# Patient Record
Sex: MALE | Race: ASIAN | NOT HISPANIC OR LATINO
[De-identification: names, ages, dates, MRNs, and addresses within clinical notes are randomized per-mention and may not be internally consistent; named-entity substitution may affect disease eponyms.]

---

## 2021-08-17 ENCOUNTER — TRANSCRIPTION ENCOUNTER (OUTPATIENT)
Age: 61
End: 2021-08-17

## 2022-11-24 ENCOUNTER — EMERGENCY (EMERGENCY)
Facility: HOSPITAL | Age: 62
LOS: 1 days | Discharge: ANOTHER HOSPITAL | End: 2022-11-24
Attending: STUDENT IN AN ORGANIZED HEALTH CARE EDUCATION/TRAINING PROGRAM
Payer: MEDICAID

## 2022-11-24 ENCOUNTER — INPATIENT (INPATIENT)
Facility: HOSPITAL | Age: 62
LOS: 61 days | Discharge: HOME | DRG: 3 | End: 2023-01-25
Attending: THORACIC SURGERY (CARDIOTHORACIC VASCULAR SURGERY) | Admitting: SURGERY
Payer: MEDICAID

## 2022-11-24 ENCOUNTER — TRANSCRIPTION ENCOUNTER (OUTPATIENT)
Age: 62
End: 2022-11-24

## 2022-11-24 VITALS
TEMPERATURE: 98 F | HEART RATE: 100 BPM | SYSTOLIC BLOOD PRESSURE: 114 MMHG | HEIGHT: 68 IN | DIASTOLIC BLOOD PRESSURE: 61 MMHG | WEIGHT: 187.39 LBS | RESPIRATION RATE: 18 BRPM | OXYGEN SATURATION: 95 %

## 2022-11-24 VITALS
RESPIRATION RATE: 20 BRPM | DIASTOLIC BLOOD PRESSURE: 63 MMHG | OXYGEN SATURATION: 99 % | TEMPERATURE: 98 F | SYSTOLIC BLOOD PRESSURE: 123 MMHG | HEART RATE: 115 BPM

## 2022-11-24 DIAGNOSIS — K85.90 ACUTE PANCREATITIS WITHOUT NECROSIS OR INFECTION, UNSPECIFIED: ICD-10-CM

## 2022-11-24 LAB
ALBUMIN SERPL ELPH-MCNC: 3.2 G/DL — LOW (ref 3.5–5)
ALBUMIN SERPL ELPH-MCNC: 4 G/DL — SIGNIFICANT CHANGE UP (ref 3.3–5)
ALP SERPL-CCNC: 117 U/L — SIGNIFICANT CHANGE UP (ref 40–120)
ALP SERPL-CCNC: 99 U/L — SIGNIFICANT CHANGE UP (ref 40–120)
ALT FLD-CCNC: 115 U/L — HIGH (ref 10–45)
ALT FLD-CCNC: 119 U/L DA — HIGH (ref 10–60)
ANION GAP SERPL CALC-SCNC: 11 MMOL/L — SIGNIFICANT CHANGE UP (ref 5–17)
ANION GAP SERPL CALC-SCNC: 14 MMOL/L — SIGNIFICANT CHANGE UP (ref 5–17)
ANION GAP SERPL CALC-SCNC: 16 MMOL/L — SIGNIFICANT CHANGE UP (ref 5–17)
APTT BLD: 24.9 SEC — LOW (ref 27.5–35.5)
APTT BLD: 25 SEC — LOW (ref 27.5–35.5)
AST SERPL-CCNC: 166 U/L — HIGH (ref 10–40)
AST SERPL-CCNC: 175 U/L — HIGH (ref 10–40)
BASOPHILS # BLD AUTO: 0.05 K/UL — SIGNIFICANT CHANGE UP (ref 0–0.2)
BASOPHILS # BLD AUTO: 0.06 K/UL — SIGNIFICANT CHANGE UP (ref 0–0.2)
BASOPHILS NFR BLD AUTO: 0.2 % — SIGNIFICANT CHANGE UP (ref 0–2)
BASOPHILS NFR BLD AUTO: 0.3 % — SIGNIFICANT CHANGE UP (ref 0–2)
BILIRUB SERPL-MCNC: 2.8 MG/DL — HIGH (ref 0.2–1.2)
BILIRUB SERPL-MCNC: 3.4 MG/DL — HIGH (ref 0.2–1.2)
BLD GP AB SCN SERPL QL: NEGATIVE — SIGNIFICANT CHANGE UP
BUN SERPL-MCNC: 29 MG/DL — HIGH (ref 7–23)
BUN SERPL-MCNC: 30 MG/DL — HIGH (ref 7–18)
CALCIUM SERPL-MCNC: 8.1 MG/DL — LOW (ref 8.4–10.5)
CALCIUM SERPL-MCNC: 9.1 MG/DL — SIGNIFICANT CHANGE UP (ref 8.4–10.5)
CHLORIDE SERPL-SCNC: 103 MMOL/L — SIGNIFICANT CHANGE UP (ref 96–108)
CHLORIDE SERPL-SCNC: 98 MMOL/L — SIGNIFICANT CHANGE UP (ref 96–108)
CHOLEST SERPL-MCNC: 98 MG/DL — SIGNIFICANT CHANGE UP
CK MB BLD-MCNC: 4.2 % — HIGH (ref 0–3.5)
CK MB CFR SERPL CALC: 9.5 NG/ML — HIGH (ref 0–6.7)
CK SERPL-CCNC: 224 U/L — HIGH (ref 30–200)
CO2 SERPL-SCNC: 19 MMOL/L — LOW (ref 22–31)
CO2 SERPL-SCNC: 21 MMOL/L — LOW (ref 22–31)
CO2 SERPL-SCNC: 23 MMOL/L — SIGNIFICANT CHANGE UP (ref 22–31)
CREAT SERPL-MCNC: 2.05 MG/DL — HIGH (ref 0.5–1.3)
CREAT SERPL-MCNC: 2.15 MG/DL — HIGH (ref 0.5–1.3)
CREAT SERPL-MCNC: 2.27 MG/DL — HIGH (ref 0.5–1.3)
EGFR: 32 ML/MIN/1.73M2 — LOW
EGFR: 34 ML/MIN/1.73M2 — LOW
EGFR: 36 ML/MIN/1.73M2 — LOW
EOSINOPHIL # BLD AUTO: 0 K/UL — SIGNIFICANT CHANGE UP (ref 0–0.5)
EOSINOPHIL # BLD AUTO: 0.01 K/UL — SIGNIFICANT CHANGE UP (ref 0–0.5)
EOSINOPHIL NFR BLD AUTO: 0 % — SIGNIFICANT CHANGE UP (ref 0–6)
FLUAV AG NPH QL: SIGNIFICANT CHANGE UP
FLUBV AG NPH QL: SIGNIFICANT CHANGE UP
GLUCOSE BLDC GLUCOMTR-MCNC: 184 MG/DL — HIGH (ref 70–99)
GLUCOSE SERPL-MCNC: 257 MG/DL — HIGH (ref 70–99)
GLUCOSE SERPL-MCNC: 260 MG/DL — HIGH (ref 70–99)
GLUCOSE SERPL-MCNC: 270 MG/DL — HIGH (ref 70–99)
HCT VFR BLD CALC: 45.4 % — SIGNIFICANT CHANGE UP (ref 39–50)
HCT VFR BLD CALC: 46.8 % — SIGNIFICANT CHANGE UP (ref 39–50)
HDLC SERPL-MCNC: 15 MG/DL — LOW
HGB BLD-MCNC: 14.6 G/DL — SIGNIFICANT CHANGE UP (ref 13–17)
HGB BLD-MCNC: 15 G/DL — SIGNIFICANT CHANGE UP (ref 13–17)
IMM GRANULOCYTES NFR BLD AUTO: 0.6 % — SIGNIFICANT CHANGE UP (ref 0–0.9)
IMM GRANULOCYTES NFR BLD AUTO: 0.7 % — SIGNIFICANT CHANGE UP (ref 0–0.9)
INR BLD: 1.04 RATIO — SIGNIFICANT CHANGE UP (ref 0.88–1.16)
INR BLD: 1.07 RATIO — SIGNIFICANT CHANGE UP (ref 0.88–1.16)
LIDOCAIN IGE QN: HIGH U/L (ref 73–393)
LIPID PNL WITH DIRECT LDL SERPL: 63 MG/DL — SIGNIFICANT CHANGE UP
LYMPHOCYTES # BLD AUTO: 0.76 K/UL — LOW (ref 1–3.3)
LYMPHOCYTES # BLD AUTO: 0.78 K/UL — LOW (ref 1–3.3)
LYMPHOCYTES # BLD AUTO: 3.8 % — LOW (ref 13–44)
LYMPHOCYTES # BLD AUTO: 3.9 % — LOW (ref 13–44)
MAGNESIUM SERPL-MCNC: 1.8 MG/DL — SIGNIFICANT CHANGE UP (ref 1.6–2.6)
MCHC RBC-ENTMCNC: 28.7 PG — SIGNIFICANT CHANGE UP (ref 27–34)
MCHC RBC-ENTMCNC: 28.9 PG — SIGNIFICANT CHANGE UP (ref 27–34)
MCHC RBC-ENTMCNC: 32.1 GM/DL — SIGNIFICANT CHANGE UP (ref 32–36)
MCHC RBC-ENTMCNC: 32.2 GM/DL — SIGNIFICANT CHANGE UP (ref 32–36)
MCV RBC AUTO: 89.7 FL — SIGNIFICANT CHANGE UP (ref 80–100)
MCV RBC AUTO: 89.9 FL — SIGNIFICANT CHANGE UP (ref 80–100)
MONOCYTES # BLD AUTO: 1.34 K/UL — HIGH (ref 0–0.9)
MONOCYTES # BLD AUTO: 1.48 K/UL — HIGH (ref 0–0.9)
MONOCYTES NFR BLD AUTO: 6.6 % — SIGNIFICANT CHANGE UP (ref 2–14)
MONOCYTES NFR BLD AUTO: 7.4 % — SIGNIFICANT CHANGE UP (ref 2–14)
NEUTROPHILS # BLD AUTO: 17.56 K/UL — HIGH (ref 1.8–7.4)
NEUTROPHILS # BLD AUTO: 17.89 K/UL — HIGH (ref 1.8–7.4)
NEUTROPHILS NFR BLD AUTO: 87.8 % — HIGH (ref 43–77)
NEUTROPHILS NFR BLD AUTO: 88.7 % — HIGH (ref 43–77)
NON HDL CHOLESTEROL: 83 MG/DL — SIGNIFICANT CHANGE UP
NRBC # BLD: 0 /100 WBCS — SIGNIFICANT CHANGE UP (ref 0–0)
NT-PROBNP SERPL-SCNC: 330 PG/ML — HIGH (ref 0–300)
PLATELET # BLD AUTO: 204 K/UL — SIGNIFICANT CHANGE UP (ref 150–400)
PLATELET # BLD AUTO: 218 K/UL — SIGNIFICANT CHANGE UP (ref 150–400)
POTASSIUM SERPL-MCNC: 4.3 MMOL/L — SIGNIFICANT CHANGE UP (ref 3.5–5.3)
POTASSIUM SERPL-MCNC: 4.4 MMOL/L — SIGNIFICANT CHANGE UP (ref 3.5–5.3)
POTASSIUM SERPL-MCNC: 5.9 MMOL/L — HIGH (ref 3.5–5.3)
POTASSIUM SERPL-SCNC: 4.3 MMOL/L — SIGNIFICANT CHANGE UP (ref 3.5–5.3)
POTASSIUM SERPL-SCNC: 4.4 MMOL/L — SIGNIFICANT CHANGE UP (ref 3.5–5.3)
POTASSIUM SERPL-SCNC: 5.9 MMOL/L — HIGH (ref 3.5–5.3)
PROT SERPL-MCNC: 7.5 G/DL — SIGNIFICANT CHANGE UP (ref 6–8.3)
PROT SERPL-MCNC: 8 G/DL — SIGNIFICANT CHANGE UP (ref 6–8.3)
PROTHROM AB SERPL-ACNC: 12 SEC — SIGNIFICANT CHANGE UP (ref 10.5–13.4)
PROTHROM AB SERPL-ACNC: 12.8 SEC — SIGNIFICANT CHANGE UP (ref 10.5–13.4)
RBC # BLD: 5.05 M/UL — SIGNIFICANT CHANGE UP (ref 4.2–5.8)
RBC # BLD: 5.22 M/UL — SIGNIFICANT CHANGE UP (ref 4.2–5.8)
RBC # FLD: 14.7 % — HIGH (ref 10.3–14.5)
RH IG SCN BLD-IMP: POSITIVE — SIGNIFICANT CHANGE UP
SARS-COV-2 RNA SPEC QL NAA+PROBE: SIGNIFICANT CHANGE UP
SODIUM SERPL-SCNC: 133 MMOL/L — LOW (ref 135–145)
SODIUM SERPL-SCNC: 137 MMOL/L — SIGNIFICANT CHANGE UP (ref 135–145)
TRIGL SERPL-MCNC: 103 MG/DL — SIGNIFICANT CHANGE UP
TROPONIN I, HIGH SENSITIVITY RESULT: 234.6 NG/L — HIGH
TROPONIN T, HIGH SENSITIVITY RESULT: 1403 NG/L — HIGH (ref 0–51)
TROPONIN T, HIGH SENSITIVITY RESULT: 455 NG/L — HIGH (ref 0–51)
TROPONIN T, HIGH SENSITIVITY RESULT: 87 NG/L — HIGH (ref 0–51)
WBC # BLD: 20.02 K/UL — HIGH (ref 3.8–10.5)
WBC # BLD: 20.18 K/UL — HIGH (ref 3.8–10.5)
WBC # FLD AUTO: 20.02 K/UL — HIGH (ref 3.8–10.5)
WBC # FLD AUTO: 20.18 K/UL — HIGH (ref 3.8–10.5)

## 2022-11-24 PROCEDURE — 76705 ECHO EXAM OF ABDOMEN: CPT | Mod: 26

## 2022-11-24 PROCEDURE — 99291 CRITICAL CARE FIRST HOUR: CPT

## 2022-11-24 PROCEDURE — 87637 SARSCOV2&INF A&B&RSV AMP PRB: CPT

## 2022-11-24 PROCEDURE — 99292 CRITICAL CARE ADDL 30 MIN: CPT

## 2022-11-24 PROCEDURE — 99253 IP/OBS CNSLTJ NEW/EST LOW 45: CPT | Mod: GC

## 2022-11-24 PROCEDURE — 85025 COMPLETE CBC W/AUTO DIFF WBC: CPT

## 2022-11-24 PROCEDURE — 74177 CT ABD & PELVIS W/CONTRAST: CPT | Mod: 26,MB

## 2022-11-24 PROCEDURE — 80053 COMPREHEN METABOLIC PANEL: CPT

## 2022-11-24 PROCEDURE — 99285 EMERGENCY DEPT VISIT HI MDM: CPT

## 2022-11-24 PROCEDURE — 85730 THROMBOPLASTIN TIME PARTIAL: CPT

## 2022-11-24 PROCEDURE — 99285 EMERGENCY DEPT VISIT HI MDM: CPT | Mod: 25

## 2022-11-24 PROCEDURE — 99284 EMERGENCY DEPT VISIT MOD MDM: CPT

## 2022-11-24 PROCEDURE — 83690 ASSAY OF LIPASE: CPT

## 2022-11-24 PROCEDURE — 96374 THER/PROPH/DIAG INJ IV PUSH: CPT

## 2022-11-24 PROCEDURE — 93010 ELECTROCARDIOGRAM REPORT: CPT

## 2022-11-24 PROCEDURE — 71045 X-RAY EXAM CHEST 1 VIEW: CPT | Mod: 26

## 2022-11-24 PROCEDURE — 85610 PROTHROMBIN TIME: CPT

## 2022-11-24 PROCEDURE — 71045 X-RAY EXAM CHEST 1 VIEW: CPT

## 2022-11-24 PROCEDURE — 36415 COLL VENOUS BLD VENIPUNCTURE: CPT

## 2022-11-24 PROCEDURE — 84484 ASSAY OF TROPONIN QUANT: CPT

## 2022-11-24 PROCEDURE — 93005 ELECTROCARDIOGRAM TRACING: CPT

## 2022-11-24 RX ORDER — HEPARIN SODIUM 5000 [USP'U]/ML
5000 INJECTION INTRAVENOUS; SUBCUTANEOUS ONCE
Refills: 0 | Status: COMPLETED | OUTPATIENT
Start: 2022-11-24 | End: 2022-11-24

## 2022-11-24 RX ORDER — ONDANSETRON 8 MG/1
4 TABLET, FILM COATED ORAL ONCE
Refills: 0 | Status: COMPLETED | OUTPATIENT
Start: 2022-11-24 | End: 2022-11-24

## 2022-11-24 RX ORDER — SODIUM CHLORIDE 9 MG/ML
1000 INJECTION, SOLUTION INTRAVENOUS
Refills: 0 | Status: DISCONTINUED | OUTPATIENT
Start: 2022-11-24 | End: 2022-11-24

## 2022-11-24 RX ORDER — CLOPIDOGREL BISULFATE 75 MG/1
75 TABLET, FILM COATED ORAL DAILY
Refills: 0 | Status: DISCONTINUED | OUTPATIENT
Start: 2022-11-25 | End: 2022-11-25

## 2022-11-24 RX ORDER — HEPARIN SODIUM 5000 [USP'U]/ML
4000 INJECTION INTRAVENOUS; SUBCUTANEOUS ONCE
Refills: 0 | Status: DISCONTINUED | OUTPATIENT
Start: 2022-11-24 | End: 2022-11-24

## 2022-11-24 RX ORDER — DEXTROSE 50 % IN WATER 50 %
15 SYRINGE (ML) INTRAVENOUS ONCE
Refills: 0 | Status: DISCONTINUED | OUTPATIENT
Start: 2022-11-24 | End: 2022-11-25

## 2022-11-24 RX ORDER — INSULIN HUMAN 100 [IU]/ML
5 INJECTION, SOLUTION SUBCUTANEOUS ONCE
Refills: 0 | Status: COMPLETED | OUTPATIENT
Start: 2022-11-24 | End: 2022-11-24

## 2022-11-24 RX ORDER — SODIUM CHLORIDE 9 MG/ML
1000 INJECTION, SOLUTION INTRAVENOUS ONCE
Refills: 0 | Status: COMPLETED | OUTPATIENT
Start: 2022-11-24 | End: 2022-11-24

## 2022-11-24 RX ORDER — SODIUM CHLORIDE 9 MG/ML
1000 INJECTION, SOLUTION INTRAVENOUS
Refills: 0 | Status: DISCONTINUED | OUTPATIENT
Start: 2022-11-24 | End: 2022-11-25

## 2022-11-24 RX ORDER — ENOXAPARIN SODIUM 100 MG/ML
40 INJECTION SUBCUTANEOUS EVERY 24 HOURS
Refills: 0 | Status: DISCONTINUED | OUTPATIENT
Start: 2022-11-24 | End: 2022-11-24

## 2022-11-24 RX ORDER — CLOPIDOGREL BISULFATE 75 MG/1
300 TABLET, FILM COATED ORAL ONCE
Refills: 0 | Status: COMPLETED | OUTPATIENT
Start: 2022-11-24 | End: 2022-11-24

## 2022-11-24 RX ORDER — DEXTROSE 50 % IN WATER 50 %
12.5 SYRINGE (ML) INTRAVENOUS ONCE
Refills: 0 | Status: DISCONTINUED | OUTPATIENT
Start: 2022-11-24 | End: 2022-11-25

## 2022-11-24 RX ORDER — CARVEDILOL PHOSPHATE 80 MG/1
6.25 CAPSULE, EXTENDED RELEASE ORAL EVERY 12 HOURS
Refills: 0 | Status: DISCONTINUED | OUTPATIENT
Start: 2022-11-24 | End: 2022-11-25

## 2022-11-24 RX ORDER — INSULIN LISPRO 100/ML
VIAL (ML) SUBCUTANEOUS
Refills: 0 | Status: DISCONTINUED | OUTPATIENT
Start: 2022-11-24 | End: 2022-11-25

## 2022-11-24 RX ORDER — PIPERACILLIN AND TAZOBACTAM 4; .5 G/20ML; G/20ML
3.38 INJECTION, POWDER, LYOPHILIZED, FOR SOLUTION INTRAVENOUS ONCE
Refills: 0 | Status: COMPLETED | OUTPATIENT
Start: 2022-11-24 | End: 2022-11-24

## 2022-11-24 RX ORDER — ACETAMINOPHEN 500 MG
1000 TABLET ORAL ONCE
Refills: 0 | Status: COMPLETED | OUTPATIENT
Start: 2022-11-24 | End: 2022-11-24

## 2022-11-24 RX ORDER — ASPIRIN/CALCIUM CARB/MAGNESIUM 324 MG
324 TABLET ORAL ONCE
Refills: 0 | Status: COMPLETED | OUTPATIENT
Start: 2022-11-24 | End: 2022-11-24

## 2022-11-24 RX ORDER — SODIUM ZIRCONIUM CYCLOSILICATE 10 G/10G
10 POWDER, FOR SUSPENSION ORAL ONCE
Refills: 0 | Status: COMPLETED | OUTPATIENT
Start: 2022-11-24 | End: 2022-11-24

## 2022-11-24 RX ORDER — HEPARIN SODIUM 5000 [USP'U]/ML
INJECTION INTRAVENOUS; SUBCUTANEOUS
Qty: 25000 | Refills: 0 | Status: DISCONTINUED | OUTPATIENT
Start: 2022-11-24 | End: 2022-11-24

## 2022-11-24 RX ORDER — HEPARIN SODIUM 5000 [USP'U]/ML
5600 INJECTION INTRAVENOUS; SUBCUTANEOUS EVERY 6 HOURS
Refills: 0 | Status: DISCONTINUED | OUTPATIENT
Start: 2022-11-24 | End: 2022-11-24

## 2022-11-24 RX ORDER — INSULIN LISPRO 100/ML
VIAL (ML) SUBCUTANEOUS AT BEDTIME
Refills: 0 | Status: DISCONTINUED | OUTPATIENT
Start: 2022-11-24 | End: 2022-11-25

## 2022-11-24 RX ORDER — HEPARIN SODIUM 5000 [USP'U]/ML
4000 INJECTION INTRAVENOUS; SUBCUTANEOUS EVERY 6 HOURS
Refills: 0 | Status: DISCONTINUED | OUTPATIENT
Start: 2022-11-24 | End: 2022-11-24

## 2022-11-24 RX ORDER — PANTOPRAZOLE SODIUM 20 MG/1
40 TABLET, DELAYED RELEASE ORAL
Refills: 0 | Status: DISCONTINUED | OUTPATIENT
Start: 2022-11-24 | End: 2022-12-08

## 2022-11-24 RX ORDER — HYDROMORPHONE HYDROCHLORIDE 2 MG/ML
0.5 INJECTION INTRAMUSCULAR; INTRAVENOUS; SUBCUTANEOUS EVERY 4 HOURS
Refills: 0 | Status: DISCONTINUED | OUTPATIENT
Start: 2022-11-24 | End: 2022-11-25

## 2022-11-24 RX ORDER — DEXTROSE 50 % IN WATER 50 %
25 SYRINGE (ML) INTRAVENOUS ONCE
Refills: 0 | Status: DISCONTINUED | OUTPATIENT
Start: 2022-11-24 | End: 2022-11-25

## 2022-11-24 RX ORDER — INFLUENZA VIRUS VACCINE 15; 15; 15; 15 UG/.5ML; UG/.5ML; UG/.5ML; UG/.5ML
0.5 SUSPENSION INTRAMUSCULAR ONCE
Refills: 0 | Status: DISCONTINUED | OUTPATIENT
Start: 2022-11-24 | End: 2022-11-25

## 2022-11-24 RX ORDER — DEXTROSE 50 % IN WATER 50 %
25 SYRINGE (ML) INTRAVENOUS ONCE
Refills: 0 | Status: COMPLETED | OUTPATIENT
Start: 2022-11-24 | End: 2022-11-24

## 2022-11-24 RX ORDER — ACETAMINOPHEN 500 MG
1000 TABLET ORAL EVERY 6 HOURS
Refills: 0 | Status: DISCONTINUED | OUTPATIENT
Start: 2022-11-24 | End: 2022-11-25

## 2022-11-24 RX ORDER — SODIUM CHLORIDE 9 MG/ML
3 INJECTION INTRAMUSCULAR; INTRAVENOUS; SUBCUTANEOUS EVERY 8 HOURS
Refills: 0 | Status: DISCONTINUED | OUTPATIENT
Start: 2022-11-24 | End: 2022-11-27

## 2022-11-24 RX ORDER — GLUCAGON INJECTION, SOLUTION 0.5 MG/.1ML
1 INJECTION, SOLUTION SUBCUTANEOUS ONCE
Refills: 0 | Status: DISCONTINUED | OUTPATIENT
Start: 2022-11-24 | End: 2022-11-25

## 2022-11-24 RX ORDER — ASPIRIN/CALCIUM CARB/MAGNESIUM 324 MG
81 TABLET ORAL DAILY
Refills: 0 | Status: DISCONTINUED | OUTPATIENT
Start: 2022-11-24 | End: 2022-11-25

## 2022-11-24 RX ADMIN — SODIUM CHLORIDE 2000 MILLILITER(S): 9 INJECTION, SOLUTION INTRAVENOUS at 10:00

## 2022-11-24 RX ADMIN — HEPARIN SODIUM 1000 UNIT(S)/HR: 5000 INJECTION INTRAVENOUS; SUBCUTANEOUS at 18:38

## 2022-11-24 RX ADMIN — HEPARIN SODIUM 5000 UNIT(S): 5000 INJECTION INTRAVENOUS; SUBCUTANEOUS at 18:40

## 2022-11-24 RX ADMIN — INSULIN HUMAN 5 UNIT(S): 100 INJECTION, SOLUTION SUBCUTANEOUS at 10:00

## 2022-11-24 RX ADMIN — Medication 81 MILLIGRAM(S): at 22:35

## 2022-11-24 RX ADMIN — CLOPIDOGREL BISULFATE 300 MILLIGRAM(S): 75 TABLET, FILM COATED ORAL at 06:40

## 2022-11-24 RX ADMIN — PIPERACILLIN AND TAZOBACTAM 200 GRAM(S): 4; .5 INJECTION, POWDER, LYOPHILIZED, FOR SOLUTION INTRAVENOUS at 14:35

## 2022-11-24 RX ADMIN — ONDANSETRON 4 MILLIGRAM(S): 8 TABLET, FILM COATED ORAL at 06:19

## 2022-11-24 RX ADMIN — ENOXAPARIN SODIUM 40 MILLIGRAM(S): 100 INJECTION SUBCUTANEOUS at 17:19

## 2022-11-24 RX ADMIN — CLOPIDOGREL BISULFATE 300 MILLIGRAM(S): 75 TABLET, FILM COATED ORAL at 07:09

## 2022-11-24 RX ADMIN — ONDANSETRON 4 MILLIGRAM(S): 8 TABLET, FILM COATED ORAL at 08:24

## 2022-11-24 RX ADMIN — Medication 400 MILLIGRAM(S): at 19:45

## 2022-11-24 RX ADMIN — Medication 25 MILLILITER(S): at 10:01

## 2022-11-24 RX ADMIN — Medication 1000 MILLIGRAM(S): at 11:05

## 2022-11-24 RX ADMIN — SODIUM CHLORIDE 150 MILLILITER(S): 9 INJECTION, SOLUTION INTRAVENOUS at 08:23

## 2022-11-24 RX ADMIN — SODIUM ZIRCONIUM CYCLOSILICATE 10 GRAM(S): 10 POWDER, FOR SUSPENSION ORAL at 10:01

## 2022-11-24 RX ADMIN — Medication 324 MILLIGRAM(S): at 06:39

## 2022-11-24 RX ADMIN — Medication 400 MILLIGRAM(S): at 08:23

## 2022-11-24 RX ADMIN — SODIUM CHLORIDE 100 MILLILITER(S): 9 INJECTION, SOLUTION INTRAVENOUS at 16:36

## 2022-11-24 NOTE — ED PROVIDER NOTE - OBJECTIVE STATEMENT
63 yo M h/o CAD s/p CABG (3 years ago in [facility]), DM, HTN p/w N/V, dizziness, chest and ABD pain with SOB. Pt woke up with these symptoms approx 1 hour ago. Pt denies recent fever, dysuria or frequency/hesitancy, focal numbness/weakness, syncope, other recent illness or hospitalizations.

## 2022-11-24 NOTE — ED PROVIDER NOTE - CLINICAL SUMMARY MEDICAL DECISION MAKING FREE TEXT BOX
Pt p/w symptoms concerning for ACS and likely STEMI on EKG. EKG faxed and Cards c/s STAT. On hold now. Labs pending. Pt on monitor.

## 2022-11-24 NOTE — CHART NOTE - NSCHARTNOTEFT_GEN_A_CORE
Vital Signs Last 24 Hrs  T(C): 36.9 (24 Nov 2022 16:00), Max: 36.9 (24 Nov 2022 16:00)  T(F): 98.4 (24 Nov 2022 16:00), Max: 98.4 (24 Nov 2022 16:00)  HR: 109 (24 Nov 2022 16:00) (108 - 115)  BP: 106/71 (24 Nov 2022 16:00) (106/61 - 123/63)  BP(mean): --  RR: 20 (24 Nov 2022 16:00) (20 - 20)  SpO2: 93% (24 Nov 2022 16:00) (93% - 100%)    Parameters below as of 24 Nov 2022 16:00  Patient On (Oxygen Delivery Method): nasal cannula  O2 Flow (L/min): 2    Troponin T, High Sensitivity Result: 455: Specimen not hemolyzed     Repeat Troponin resulted for this patient and it was rising. 455 from 205. Patient without chest pain/SOB/palpitations, vitals stable. Repeat EKG ordered. ECHO was ordered but not completed at this time. Urgent bedside ECHO not needed as per conversation with in house cardiology fellow. Low likelihood of ACS. Patient already received bolus dose of aspirin and plavix. Will start heparin drip.     Keith Parker, PGY1  Trauma Surg [phone] Vital Signs Last 24 Hrs  T(C): 36.9 (24 Nov 2022 16:00), Max: 36.9 (24 Nov 2022 16:00)  T(F): 98.4 (24 Nov 2022 16:00), Max: 98.4 (24 Nov 2022 16:00)  HR: 109 (24 Nov 2022 16:00) (108 - 115)  BP: 106/71 (24 Nov 2022 16:00) (106/61 - 123/63)  BP(mean): --  RR: 20 (24 Nov 2022 16:00) (20 - 20)  SpO2: 93% (24 Nov 2022 16:00) (93% - 100%)    Parameters below as of 24 Nov 2022 16:00  Patient On (Oxygen Delivery Method): nasal cannula  O2 Flow (L/min): 2    Troponin T, High Sensitivity Result: 455: Specimen not hemolyzed     Repeat Troponin resulted for this patient and it was rising. 455 from 205. Patient without chest pain/SOB/palpitations, vitals stable. Repeat EKG ordered. ECHO was ordered but not completed at this time. Urgent bedside ECHO not needed as per conversation with in house cardiology fellow. Low likelihood of ACS. Patient already received bolus dose of aspirin and plavix. Will start heparin drip.     Keith Parker, PGY1  Trauma Surg [phone] Vital Signs Last 24 Hrs  T(C): 36.9 (24 Nov 2022 16:00), Max: 36.9 (24 Nov 2022 16:00)  T(F): 98.4 (24 Nov 2022 16:00), Max: 98.4 (24 Nov 2022 16:00)  HR: 109 (24 Nov 2022 16:00) (108 - 115)  BP: 106/71 (24 Nov 2022 16:00) (106/61 - 123/63)  BP(mean): --  RR: 20 (24 Nov 2022 16:00) (20 - 20)  SpO2: 93% (24 Nov 2022 16:00) (93% - 100%)    Parameters below as of 24 Nov 2022 16:00  Patient On (Oxygen Delivery Method): nasal cannula  O2 Flow (L/min): 2    Troponin T, High Sensitivity Result: 455: Specimen not hemolyzed     Repeat Troponin resulted for this patient and it was rising. 455 from 205. Patient without chest pain/SOB/palpitations, vitals stable. Repeat EKG ordered. ECHO was ordered but not completed at this time. Urgent bedside ECHO not needed as per conversation with in house cardiology fellow. Low likelihood of ACS. Patient already received bolus dose of aspirin and plavix. Will start heparin drip.     Keith Parker, PGY1  Trauma Surg [phone]

## 2022-11-24 NOTE — ED PROVIDER NOTE - NS ED ROS FT
Patient Reports No  Fever/Chills  Urinary Symptoms  Syncope, Focal Numbness or Weakness  Other Recent Illness or Hospitalizations

## 2022-11-24 NOTE — ED PROVIDER NOTE - PHYSICAL EXAMINATION
Gen: Elevated BMI, uncomfortable appearing, hemodynamically stable   HEENT: No nasal discharge, mucous membranes dry, no oropharyngeal edema/erythema/exudates   CV: RRR, +S1/S2, no M/R/G, equal b/l radial pulses 2+ and equal BP b/l in UE   Resp: CTAB, no W/R/R, SPO2 >95% on RA, no increased WOB   GI: Abdomen soft non-distended, diffuse TTP with no rebound/guarding, no masses/organomegaly   MSK/Skin: No midline spinal TTP with mild b/l upper thoracic paraspinal TTP, no CVA tenderness, no open wounds, no bruising, no LE edema  Neuro: CN2-12 grossly intact, A&Ox4, MS +5/5 in UE and LE BL, gross sensation intact in UE and LE BL, gait smooth and coordinated, negative pronator drift   Psych: appropriate mood

## 2022-11-24 NOTE — ED PROVIDER NOTE - PROGRESS NOTE DETAILS
Lance, PGY-3  Cards consulted for c/f STEMI and will see patient Lance, PGY-3  Cards report no need cath lab at this time Lance, PGY-3  Cards consulted for c/f STEMI at OSH and will see patient Lance, PGY-3  Cholecystitis shown on U/s. Surgery consulted and will see patient. 2nd trop elevated from 87 to 207. Cardiology made aware who report unlikely ACS in setting of pancreatitis and report no heparin at this time. Continue to trend cardiac enzymes. Will send 3hr trop

## 2022-11-24 NOTE — H&P ADULT - NSHPPHYSICALEXAM_GEN_ALL_CORE
PHYSICAL EXAM:  GENERAL: NAD, well-groomed, well-developed  HEAD:  Atraumatic, Normocephalic  RESPIRATORY: breathing comfortably on RA  ABDOMEN: Soft, Nontender, Nondistended; Wound VAC on and functional  NEUROLOGY: A&Ox3, nonfocal, moving all extremities  EXTREMITIES:  2+ Peripheral Pulses, No clubbing, cyanosis, or edema  SKIN: warm, dry, normal color, no rash or abnormal lesions

## 2022-11-24 NOTE — ED PROVIDER NOTE - OBJECTIVE STATEMENT
61 y/o male with pmhx of quadruple bypass, DM, HTN, HLD presenting as transfer from [place] for c/f STEMI. PTA arrival received 325 aspirin, 600 plavix, and no heparin drip started. Around 1:30 am patient had multiple episodes of non-bloody diarrhea and emesis with associated abdominal pain and chest pain, unable to localize. No recent fevers/chills, cough, rashes, or changes in urination. Does report mild diffuse back pain; started 5 days ago in setting on injury while walking and lifting objects. 63 y/o male with pmhx of quadruple bypass, DM, HTN, HLD presenting as transfer from [place] for c/f STEMI. PTA arrival received 325 aspirin, 600 plavix, and no heparin drip started. Around 1:30 am patient had multiple episodes of non-bloody diarrhea and emesis with associated abdominal pain and chest pain, unable to localize. No recent fevers/chills, cough, rashes, or changes in urination. Does report mild diffuse back pain; started 5 days ago in setting on injury while walking and lifting objects. 63 y/o male with pmhx of quadruple bypass, DM, HTN, HLD presenting as transfer from [place] for c/f STEMI. PTA arrival received 325 aspirin, 600 plavix, and no heparin drip started. Around 1:30 am patient had multiple episodes of non-bloody diarrhea and emesis with associated abdominal pain and chest pain, unable to localize. No recent fevers/chills, cough, rashes, or changes in urination. Does report mild diffuse back pain; started 5 days ago in setting on injury while walking and lifting objects. 61 y/o male with pmhx of CABG, DM, HTN, HLD presenting as transfer from [place] for c/f STEMI. PTA arrival received 325 aspirin, 600 plavix, and no heparin drip started. Around 1:30 am patient had multiple episodes of non-bloody diarrhea and emesis with associated abdominal pain and chest pain, unable to localize, non-radiating, with associated SOB and no associated palpitations or diaphoresis. No recent fevers/chills, cough, rashes, or changes in urination. Does report mild diffuse back pain; started 5 days ago in setting on injury while walking and lifting objects. Denies focal weakness, numbness/tingling, or LOC due does report mild dizziness. 61 y/o male with pmhx of CABG, DM, HTN, HLD presenting as transfer from [place] for c/f STEMI. PTA arrival received 325 aspirin, 600 plavix, and no heparin drip started. Around 1:30 am patient had multiple episodes of non-bloody diarrhea and emesis with associated abdominal pain and chest pain, unable to localize, non-radiating, with associated SOB and no associated palpitations or diaphoresis. No recent fevers/chills, cough, rashes, or changes in urination. Does report mild diffuse back pain; started 5 days ago in setting on injury while walking and lifting objects. Denies focal weakness, numbness/tingling, or LOC due does report mild dizziness. 61 y/o male with pmhx of CABG, DM, HTN, HLD presenting as transfer from [place] for c/f STEMI. PTA arrival received 325 aspirin, 600 plavix, and no heparin drip started. Around 1:30 am patient had multiple episodes of non-bloody diarrhea and emesis with associated abdominal pain and chest pain, unable to localize, non-radiating, with associated SOB and no associated palpitations or diaphoresis. No recent fevers/chills, cough, rashes, or changes in urination. Does report mild diffuse back pain; started 5 days ago in setting on injury while walking and lifting objects. Denies focal weakness, numbness/tingling, or LOC due does report mild dizziness.

## 2022-11-24 NOTE — H&P ADULT - NSHPLABSRESULTS_GEN_ALL_CORE
WBC Count: 20.02 K/uL (11-24-22 @ 07:57)   Hematocrit: 46.8 % (11-24-22 @ 07:57)   Creatinine, Serum: 2.05 mg/dL (11-24-22 @ 11:31)  Creatinine, Serum: 2.15 mg/dL (11-24-22 @ 07:57)      US ABDOMEN RT UPR QUADRANT    PROCEDURE DATE: 11/24/2022        INTERPRETATION: CLINICAL INFORMATION: Abdominal pain with elevated bilirubin and acute interstitial pancreatitis.    COMPARISON: Same-day CT abdomen pelvis    TECHNIQUE: Sonography of the right upper quadrant.    FINDINGS:    Liver: Measures 16.2 cm with slightly heterogeneous parenchyma. Liver parenchyma is limited in evaluation due to shadowing artifact.  Bile ducts: Normal caliber. Common bile duct measures 4 mm.  Gallbladder: Gallbladder wall edema, thickened to 8 mm, with numerous nonmobile tiny stones versus tumefactive sludge. Trace pericholecystic fluid. Dave's sign is negative, however patient received pain medication.  Pancreas: Poorly visualized.  Right kidney: Measures 13.6 cm. No hydronephrosis.  Ascites: None.  Aorta/ IVC: Suboptimally assessed due to artifact.    IMPRESSION:    Gallbladder wall edema, thickened to 8 mm, with numerous nonmobile tiny stones versus tumefactive sludge. Trace pericholecystic fluid. Daev's sign is negative, however patient received pain medication. Findings are concerning for acute cholecystitis. Recommend HIDA scan for confirmation.    --- End of Report ---      CT ABDOMEN AND PELVIS IC    PROCEDURE DATE: 11/24/2022        INTERPRETATION: CLINICAL INFORMATION: Abdominal pain with elevated lipase.    COMPARISON: None.    CONTRAST/COMPLICATIONS:  IV Contrast: Omnipaque 350 90 cc administered 10 cc discarded  Oral Contrast: NONE  Complications: None reported at time of study completion    PROCEDURE:  CT of the Abdomen and Pelvis was performed.  Sagittal and coronal reformats were performed.    FINDINGS:  LOWER CHEST: Bibasilar atelectasis. Coronary artery calcifications.    LIVER: Within normal limits.  BILE DUCTS: Normal caliber.  GALLBLADDER: Mild gallbladder wall edema, likely secondary to acute pancreatic process.  SPLEEN: Within normal limits.  PANCREAS: Homogeneous parenchymal enhancement of the pancreas with surrounding fat stranding/inflammatory changes. No peripancreatic fluid collection.  ADRENALS: Within normal limits.  KIDNEYS/URETERS: Within normal limits.    BLADDER: Within normal limits.  REPRODUCTIVE ORGANS: Prostate within normal limits.    BOWEL: Submucosal fatty infiltration of the ascending colon, likely sequela of prior colitis. Mild bowel thickening of the duodenum and transverse colon, likely secondary to acute pancreatic process. No bowel obstruction. Appendix is normal.  PERITONEUM: No ascites.  VESSELS: Atherosclerotic changes.  RETROPERITONEUM/LYMPH NODES: No lymphadenopathy.  ABDOMINAL WALL: Within normal limits.  BONES: Degenerative changes.    IMPRESSION:  Acute interstitial edematous pancreatitis. No peripancreatic fluid collection.    --- End of Report --- WBC Count: 20.02 K/uL (11-24-22 @ 07:57)   Hematocrit: 46.8 % (11-24-22 @ 07:57)   Creatinine, Serum: 2.05 mg/dL (11-24-22 @ 11:31)  Creatinine, Serum: 2.15 mg/dL (11-24-22 @ 07:57)      US ABDOMEN RT UPR QUADRANT    PROCEDURE DATE: 11/24/2022        INTERPRETATION: CLINICAL INFORMATION: Abdominal pain with elevated bilirubin and acute interstitial pancreatitis.    COMPARISON: Same-day CT abdomen pelvis    TECHNIQUE: Sonography of the right upper quadrant.    FINDINGS:    Liver: Measures 16.2 cm with slightly heterogeneous parenchyma. Liver parenchyma is limited in evaluation due to shadowing artifact.  Bile ducts: Normal caliber. Common bile duct measures 4 mm.  Gallbladder: Gallbladder wall edema, thickened to 8 mm, with numerous nonmobile tiny stones versus tumefactive sludge. Trace pericholecystic fluid. Dave's sign is negative, however patient received pain medication.  Pancreas: Poorly visualized.  Right kidney: Measures 13.6 cm. No hydronephrosis.  Ascites: None.  Aorta/ IVC: Suboptimally assessed due to artifact.    IMPRESSION:    Gallbladder wall edema, thickened to 8 mm, with numerous nonmobile tiny stones versus tumefactive sludge. Trace pericholecystic fluid. Dave's sign is negative, however patient received pain medication. Findings are concerning for acute cholecystitis. Recommend HIDA scan for confirmation.    --- End of Report ---      CT ABDOMEN AND PELVIS IC    PROCEDURE DATE: 11/24/2022        INTERPRETATION: CLINICAL INFORMATION: Abdominal pain with elevated lipase.    COMPARISON: None.    CONTRAST/COMPLICATIONS:  IV Contrast: Omnipaque 350 90 cc administered 10 cc discarded  Oral Contrast: NONE  Complications: None reported at time of study completion    PROCEDURE:  CT of the Abdomen and Pelvis was performed.  Sagittal and coronal reformats were performed.    FINDINGS:  LOWER CHEST: Bibasilar atelectasis. Coronary artery calcifications.    LIVER: Within normal limits.  BILE DUCTS: Normal caliber.  GALLBLADDER: Mild gallbladder wall edema, likely secondary to acute pancreatic process.  SPLEEN: Within normal limits.  PANCREAS: Homogeneous parenchymal enhancement of the pancreas with surrounding fat stranding/inflammatory changes. No peripancreatic fluid collection.  ADRENALS: Within normal limits.  KIDNEYS/URETERS: Within normal limits.    BLADDER: Within normal limits.  REPRODUCTIVE ORGANS: Prostate within normal limits.    BOWEL: Submucosal fatty infiltration of the ascending colon, likely sequela of prior colitis. Mild bowel thickening of the duodenum and transverse colon, likely secondary to acute pancreatic process. No bowel obstruction. Appendix is normal.  PERITONEUM: No ascites.  VESSELS: Atherosclerotic changes.  RETROPERITONEUM/LYMPH NODES: No lymphadenopathy.  ABDOMINAL WALL: Within normal limits.  BONES: Degenerative changes.    IMPRESSION:  Acute interstitial edematous pancreatitis. No peripancreatic fluid collection.    --- End of Report ---

## 2022-11-24 NOTE — CONSULT NOTE ADULT - SUBJECTIVE AND OBJECTIVE BOX
Patient seen and evaluated at bedside    Chief Complaint: Abdominal pain, nausea, vomiting    HPI:  This is a 63yo Male with PMHx of CABG x4 in [facility] ~3yrs ago, T2DM, HTN, HLD, who presented initially to [place] ED for one day of abdominal pain, nausea, vomiting. Found to have abnormal EKG and was transferred here for further evaluation.    Patient is accompanied by his daughter. Both ate samosas last night and have not been feeling well since then. Patient woke up ~1:30am with severe abdominal pain located in his epigastric region and radiates to his left chest wall and left side of his back. Also with 6 episodes of vomiting since then and loose stools. Daughter also with loose stools and nausea. Patient denies fevers, chills, shortness of breath, orthopnea, PND sxs, lower extremity edema. Patient reports that these sxs are different than what he had prior to needing his CABG.     EKG in ED shows sinus tach with LVH, STD I, II, V4-V6. No prior baseline for comparison.    Labs at OSH notable for Lipase of 30,000.       PMHx:       PSHx:       Allergies:  No Known Allergies      Home Meds:    Current Medications:   acetaminophen   IVPB .. 1000 milliGRAM(s) IV Intermittent Once  lactated ringers. 1000 milliLiter(s) IV Continuous <Continuous>  ondansetron Injectable 4 milliGRAM(s) IV Push once      FAMILY HISTORY:      Social History:  Smoking History:  Alcohol Use:  Drug Use:    REVIEW OF SYSTEMS:  Constitutional:     [x ] negative [ ] fevers [ ] chills [ ] weight loss [ ] weight gain  HEENT:                  [x ] negative [ ] dry eyes [ ] eye irritation [ ] postnasal drip [ ] nasal congestion  CV:                         [ x] negative  [ ] chest pain [ ] orthopnea [ ] palpitations [ ] murmur  Resp:                     [x ] negative [ ] cough [ ] shortness of breath [ ] dyspnea [ ] wheezing [ ] sputum [ ]hemoptysis  GI:                          [ x] negative [ ] nausea [ ] vomiting [ ] diarrhea [ ] constipation [ ] abd pain [ ] dysphagia   :                        [ x] negative [ ] dysuria [ ] nocturia [ ] hematuria [ ] increased urinary frequency  Musculoskeletal: [x ] negative [ ] back pain [ ] myalgias [ ] arthralgias [ ] fracture  Skin:                       [ x] negative [ ] rash [ ] itch  Neurological:        [ x] negative [ ] headache [ ] dizziness [ ] syncope [ ] weakness [ ] numbness  Psychiatric:           [ x] negative [ ] anxiety [ ] depression  Endocrine:            [ x] negative [ ] diabetes [ ] thyroid problem  Heme/Lymph:      [ x] negative [ ] anemia [ ] bleeding problem  Allergic/Immune: [ x] negative [ ] itchy eyes [ ] nasal discharge [ ] hives [ ] angioedema    [ x] All other systems negative  [ ] Unable to assess ROS due to      Physical Exam:  T(F): 98.1 (11-24), Max: 98.1 (11-24)  HR: 108 (11-24) (108 - 115)  BP: 122/64 (11-24) (122/64 - 123/63)  RR: 20 (11-24)  SpO2: 100% (11-24)  GENERAL: No acute distress, well-developed  HEAD:  Atraumatic, Normocephalic  ENT: EOMI, PERRLA, conjunctiva and sclera clear, Neck supple, No JVD, moist mucosa  CHEST/LUNG: Clear to auscultation bilaterally; No wheeze, equal breath sounds bilaterally   BACK: No spinal tenderness  HEART: Regular rate and rhythm; No murmurs, rubs, or gallops  ABDOMEN: Soft, Nontender, Nondistended; Bowel sounds present  EXTREMITIES:  No clubbing, cyanosis, or edema  PSYCH: Nl behavior, nl affect  NEUROLOGY: AAOx3, non-focal, cranial nerves intact  SKIN: Normal color, No rashes or lesions  LINES:    Cardiovascular Diagnostic Testing:    ECG: Personally reviewed:    Echo: Personally reviewed:    Stress Testing:    Cath:    Imaging:    CXR: Personally reviewed    Labs: Personally reviewed                   Patient seen and evaluated at bedside    Chief Complaint: Abdominal pain, nausea, vomiting    HPI:  This is a 61yo Male with PMHx of CABG x4 in [facility] ~3yrs ago, T2DM, HTN, HLD, who presented initially to [place] ED for one day of abdominal pain, nausea, vomiting. Found to have abnormal EKG and was transferred here for further evaluation.    Patient is accompanied by his daughter. Both ate samosas last night and have not been feeling well since then. Patient woke up ~1:30am with severe abdominal pain located in his epigastric region and radiates to his left chest wall and left side of his back. Also with 6 episodes of vomiting since then and loose stools. Daughter also with loose stools and nausea. Patient denies fevers, chills, shortness of breath, orthopnea, PND sxs, lower extremity edema. Patient reports that these sxs are different than what he had prior to needing his CABG.     EKG in ED shows sinus tach with LVH, STD I, II, V4-V6. No prior baseline for comparison.    Labs at OSH notable for Lipase of 30,000.       PMHx:       PSHx:       Allergies:  No Known Allergies      Home Meds:    Current Medications:   acetaminophen   IVPB .. 1000 milliGRAM(s) IV Intermittent Once  lactated ringers. 1000 milliLiter(s) IV Continuous <Continuous>  ondansetron Injectable 4 milliGRAM(s) IV Push once      FAMILY HISTORY:      Social History:  Smoking History:  Alcohol Use:  Drug Use:    REVIEW OF SYSTEMS:  Constitutional:     [x ] negative [ ] fevers [ ] chills [ ] weight loss [ ] weight gain  HEENT:                  [x ] negative [ ] dry eyes [ ] eye irritation [ ] postnasal drip [ ] nasal congestion  CV:                         [ x] negative  [ ] chest pain [ ] orthopnea [ ] palpitations [ ] murmur  Resp:                     [x ] negative [ ] cough [ ] shortness of breath [ ] dyspnea [ ] wheezing [ ] sputum [ ]hemoptysis  GI:                          [ x] negative [ ] nausea [ ] vomiting [ ] diarrhea [ ] constipation [ ] abd pain [ ] dysphagia   :                        [ x] negative [ ] dysuria [ ] nocturia [ ] hematuria [ ] increased urinary frequency  Musculoskeletal: [x ] negative [ ] back pain [ ] myalgias [ ] arthralgias [ ] fracture  Skin:                       [ x] negative [ ] rash [ ] itch  Neurological:        [ x] negative [ ] headache [ ] dizziness [ ] syncope [ ] weakness [ ] numbness  Psychiatric:           [ x] negative [ ] anxiety [ ] depression  Endocrine:            [ x] negative [ ] diabetes [ ] thyroid problem  Heme/Lymph:      [ x] negative [ ] anemia [ ] bleeding problem  Allergic/Immune: [ x] negative [ ] itchy eyes [ ] nasal discharge [ ] hives [ ] angioedema    [ x] All other systems negative  [ ] Unable to assess ROS due to      Physical Exam:  T(F): 98.1 (11-24), Max: 98.1 (11-24)  HR: 108 (11-24) (108 - 115)  BP: 122/64 (11-24) (122/64 - 123/63)  RR: 20 (11-24)  SpO2: 100% (11-24)  GENERAL: No acute distress, well-developed  HEAD:  Atraumatic, Normocephalic  ENT: EOMI, PERRLA, conjunctiva and sclera clear, Neck supple, No JVD, moist mucosa  CHEST/LUNG: Clear to auscultation bilaterally; No wheeze, equal breath sounds bilaterally   BACK: No spinal tenderness  HEART: Regular rate and rhythm; No murmurs, rubs, or gallops  ABDOMEN: Soft, Nontender, Nondistended; Bowel sounds present  EXTREMITIES:  No clubbing, cyanosis, or edema  PSYCH: Nl behavior, nl affect  NEUROLOGY: AAOx3, non-focal, cranial nerves intact  SKIN: Normal color, No rashes or lesions  LINES:    Cardiovascular Diagnostic Testing:    ECG: Personally reviewed:    Echo: Personally reviewed:    Stress Testing:    Cath:    Imaging:    CXR: Personally reviewed    Labs: Personally reviewed                   Patient seen and evaluated at bedside    Chief Complaint: Abdominal pain, nausea, vomiting    HPI:  This is a 61yo Male with PMHx of CABG x4 in [facility] ~3yrs ago, T2DM, HTN, HLD, who presented initially to [facility] ED for one day of abdominal pain, nausea, vomiting. Found to have abnormal EKG and was transferred here for further evaluation.    Patient is accompanied by his daughter. Both ate samosas last night and have not been feeling well since then. Patient woke up ~1:30am with severe abdominal pain located in his epigastric region and radiates to his left chest wall and left side of his back. Also with 6 episodes of vomiting since then and loose stools. Daughter also with loose stools and nausea. Patient denies fevers, chills, shortness of breath, orthopnea, PND sxs, lower extremity edema. Patient reports that these sxs are different than what he had prior to needing his CABG.     EKG in ED shows sinus tach with LVH, STD I, II, V4-V6. No prior baseline for comparison.    Labs at OSH notable for Lipase of 30,000.       PMHx:       PSHx:       Allergies:  No Known Allergies      Home Meds:    Current Medications:   acetaminophen   IVPB .. 1000 milliGRAM(s) IV Intermittent Once  lactated ringers. 1000 milliLiter(s) IV Continuous <Continuous>  ondansetron Injectable 4 milliGRAM(s) IV Push once      FAMILY HISTORY:      Social History:  Smoking History:  Alcohol Use:  Drug Use:    REVIEW OF SYSTEMS:  Constitutional:     [x ] negative [ ] fevers [ ] chills [ ] weight loss [ ] weight gain  HEENT:                  [x ] negative [ ] dry eyes [ ] eye irritation [ ] postnasal drip [ ] nasal congestion  CV:                         [ x] negative  [ ] chest pain [ ] orthopnea [ ] palpitations [ ] murmur  Resp:                     [x ] negative [ ] cough [ ] shortness of breath [ ] dyspnea [ ] wheezing [ ] sputum [ ]hemoptysis  GI:                          [ x] negative [ ] nausea [ ] vomiting [ ] diarrhea [ ] constipation [ ] abd pain [ ] dysphagia   :                        [ x] negative [ ] dysuria [ ] nocturia [ ] hematuria [ ] increased urinary frequency  Musculoskeletal: [x ] negative [ ] back pain [ ] myalgias [ ] arthralgias [ ] fracture  Skin:                       [ x] negative [ ] rash [ ] itch  Neurological:        [ x] negative [ ] headache [ ] dizziness [ ] syncope [ ] weakness [ ] numbness  Psychiatric:           [ x] negative [ ] anxiety [ ] depression  Endocrine:            [ x] negative [ ] diabetes [ ] thyroid problem  Heme/Lymph:      [ x] negative [ ] anemia [ ] bleeding problem  Allergic/Immune: [ x] negative [ ] itchy eyes [ ] nasal discharge [ ] hives [ ] angioedema    [ x] All other systems negative  [ ] Unable to assess ROS due to      Physical Exam:  T(F): 98.1 (11-24), Max: 98.1 (11-24)  HR: 108 (11-24) (108 - 115)  BP: 122/64 (11-24) (122/64 - 123/63)  RR: 20 (11-24)  SpO2: 100% (11-24)  GENERAL: No acute distress, well-developed  HEAD:  Atraumatic, Normocephalic  ENT: EOMI, PERRLA, conjunctiva and sclera clear, Neck supple, No JVD, moist mucosa  CHEST/LUNG: Clear to auscultation bilaterally; No wheeze, equal breath sounds bilaterally   BACK: No spinal tenderness  HEART: Regular rate and rhythm; No murmurs, rubs, or gallops  ABDOMEN: Soft, Nontender, Nondistended; Bowel sounds present  EXTREMITIES:  No clubbing, cyanosis, or edema  PSYCH: Nl behavior, nl affect  NEUROLOGY: AAOx3, non-focal, cranial nerves intact  SKIN: Normal color, No rashes or lesions  LINES:    Cardiovascular Diagnostic Testing:    ECG: Personally reviewed:    Echo: Personally reviewed:    Stress Testing:    Cath:    Imaging:    CXR: Personally reviewed    Labs: Personally reviewed                   Patient seen and evaluated at bedside    Chief Complaint: Abdominal pain, nausea, vomiting    HPI:  This is a 63yo Male with PMHx of CABG x4 in [facility] ~3yrs ago, T2DM, HTN, HLD, who presented initially to [place] ED for one day of abdominal pain, nausea, vomiting. Found to have abnormal EKG and was transferred here for further evaluation.    Patient is accompanied by his daughter. Both ate samosas last night and have not been feeling well since then. Patient woke up ~1:30am with severe abdominal pain located in his epigastric region and radiates to his left chest wall and left side of his back. Also with 6 episodes of vomiting since then and loose stools. Daughter also with loose stools and nausea. Patient denies fevers, chills, shortness of breath, orthopnea, PND sxs, lower extremity edema. Patient reports that these sxs are different than what he had prior to needing his CABG.     EKG in ED shows sinus tach with LVH, STD I, II, V4-V6. Similar to EKG at [place]. No prior baseline for comparison.    Labs at OSH notable for Lipase of >30,000, WBC 20, Cr 2.27, , , Troponin i 234, Covid-19 negative.     Afebrile, HR 110s, BPs 110s/70s.        PMHx:   CABG  HTN  HLD  T2DM    PSHx:   CABG    Allergies:  No Known Allergies      Current Medications:   acetaminophen   IVPB .. 1000 milliGRAM(s) IV Intermittent Once  lactated ringers. 1000 milliLiter(s) IV Continuous <Continuous>  ondansetron Injectable 4 milliGRAM(s) IV Push once    Social History:  Smoking History: denies  Alcohol Use: denies  Drug Use: denies    REVIEW OF SYSTEMS:  Constitutional:     [x ] negative [ ] fevers [ ] chills [ ] weight loss [ ] weight gain  HEENT:                  [x ] negative [ ] dry eyes [ ] eye irritation [ ] postnasal drip [ ] nasal congestion  CV:                         [ x] negative  [ ] chest pain [ ] orthopnea [ ] palpitations [ ] murmur  Resp:                     [x ] negative [ ] cough [ ] shortness of breath [ ] dyspnea [ ] wheezing [ ] sputum [ ]hemoptysis  GI:                          [ ] negative [x ] nausea [ x] vomiting [x ] diarrhea [ ] constipation [ x] abd pain [ ] dysphagia   :                        [ x] negative [ ] dysuria [ ] nocturia [ ] hematuria [ ] increased urinary frequency  Musculoskeletal: [x ] negative [ ] back pain [ ] myalgias [ ] arthralgias [ ] fracture  Skin:                       [ x] negative [ ] rash [ ] itch  Neurological:        [ x] negative [ ] headache [ ] dizziness [ ] syncope [ ] weakness [ ] numbness  Psychiatric:           [ x] negative [ ] anxiety [ ] depression  Endocrine:            [ x] negative [ ] diabetes [ ] thyroid problem  Heme/Lymph:      [ x] negative [ ] anemia [ ] bleeding problem  Allergic/Immune: [ x] negative [ ] itchy eyes [ ] nasal discharge [ ] hives [ ] angioedema    [ x] All other systems negative  [ ] Unable to assess ROS due to      Physical Exam:  T(F): 98.1 (11-24), Max: 98.1 (11-24)  HR: 108 (11-24) (108 - 115)  BP: 122/64 (11-24) (122/64 - 123/63)  RR: 20 (11-24)  SpO2: 100% (11-24)  GENERAL: No acute distress  HEAD:  Atraumatic, Normocephalic  ENT: EOMI, conjunctiva and sclera clear, Neck supple, No JVD, moist mucosa  CHEST/LUNG: Clear to auscultation bilaterally; No wheeze, equal breath sounds bilaterally   HEART: Regular rhythm; tachycardic, No murmurs, rubs, or gallops  ABDOMEN: Soft, Tender in epigastric region, Nondistended; Bowel sounds present  EXTREMITIES:  No clubbing, cyanosis, or edema  PSYCH: Nl behavior, nl affect  NEUROLOGY: AAOx3, non-focal, cranial nerves intact  SKIN: Normal color, No rashes or lesions    Cardiovascular Diagnostic Testing:    ECG: Personally reviewed:  EKG in ED shows sinus tach with LVH, STD I, II, V4-V6. No prior baseline for comparison.    Labs: Personally reviewed   Patient seen and evaluated at bedside    Chief Complaint: Abdominal pain, nausea, vomiting    HPI:  This is a 63yo Male with PMHx of CABG x4 in [facility] ~3yrs ago, T2DM, HTN, HLD, who presented initially to [place] ED for one day of abdominal pain, nausea, vomiting. Found to have abnormal EKG and was transferred here for further evaluation.    Patient is accompanied by his daughter. Both ate samosas last night and have not been feeling well since then. Patient woke up ~1:30am with severe abdominal pain located in his epigastric region and radiates to his left chest wall and left side of his back. Also with 6 episodes of vomiting since then and loose stools. Daughter also with loose stools and nausea. Patient denies fevers, chills, shortness of breath, orthopnea, PND sxs, lower extremity edema. Patient reports that these sxs are different than what he had prior to needing his CABG.     EKG in ED shows sinus tach with LVH, STD I, II, V4-V6. Similar to EKG at [facility]. No prior baseline for comparison.    Labs at OSH notable for Lipase of >30,000, WBC 20, Cr 2.27, , , Troponin i 234, Covid-19 negative.     Afebrile, HR 110s, BPs 110s/70s.        PMHx:   CABG  HTN  HLD  T2DM    PSHx:   CABG    Allergies:  No Known Allergies      Current Medications:   acetaminophen   IVPB .. 1000 milliGRAM(s) IV Intermittent Once  lactated ringers. 1000 milliLiter(s) IV Continuous <Continuous>  ondansetron Injectable 4 milliGRAM(s) IV Push once    Social History:  Smoking History: denies  Alcohol Use: denies  Drug Use: denies    REVIEW OF SYSTEMS:  Constitutional:     [x ] negative [ ] fevers [ ] chills [ ] weight loss [ ] weight gain  HEENT:                  [x ] negative [ ] dry eyes [ ] eye irritation [ ] postnasal drip [ ] nasal congestion  CV:                         [ x] negative  [ ] chest pain [ ] orthopnea [ ] palpitations [ ] murmur  Resp:                     [x ] negative [ ] cough [ ] shortness of breath [ ] dyspnea [ ] wheezing [ ] sputum [ ]hemoptysis  GI:                          [ ] negative [x ] nausea [ x] vomiting [x ] diarrhea [ ] constipation [ x] abd pain [ ] dysphagia   :                        [ x] negative [ ] dysuria [ ] nocturia [ ] hematuria [ ] increased urinary frequency  Musculoskeletal: [x ] negative [ ] back pain [ ] myalgias [ ] arthralgias [ ] fracture  Skin:                       [ x] negative [ ] rash [ ] itch  Neurological:        [ x] negative [ ] headache [ ] dizziness [ ] syncope [ ] weakness [ ] numbness  Psychiatric:           [ x] negative [ ] anxiety [ ] depression  Endocrine:            [ x] negative [ ] diabetes [ ] thyroid problem  Heme/Lymph:      [ x] negative [ ] anemia [ ] bleeding problem  Allergic/Immune: [ x] negative [ ] itchy eyes [ ] nasal discharge [ ] hives [ ] angioedema    [ x] All other systems negative  [ ] Unable to assess ROS due to      Physical Exam:  T(F): 98.1 (11-24), Max: 98.1 (11-24)  HR: 108 (11-24) (108 - 115)  BP: 122/64 (11-24) (122/64 - 123/63)  RR: 20 (11-24)  SpO2: 100% (11-24)  GENERAL: No acute distress  HEAD:  Atraumatic, Normocephalic  ENT: EOMI, conjunctiva and sclera clear, Neck supple, No JVD, moist mucosa  CHEST/LUNG: Clear to auscultation bilaterally; No wheeze, equal breath sounds bilaterally   HEART: Regular rhythm; tachycardic, No murmurs, rubs, or gallops  ABDOMEN: Soft, Tender in epigastric region, Nondistended; Bowel sounds present  EXTREMITIES:  No clubbing, cyanosis, or edema  PSYCH: Nl behavior, nl affect  NEUROLOGY: AAOx3, non-focal, cranial nerves intact  SKIN: Normal color, No rashes or lesions    Cardiovascular Diagnostic Testing:    ECG: Personally reviewed:  EKG in ED shows sinus tach with LVH, STD I, II, V4-V6. No prior baseline for comparison.    Labs: Personally reviewed   Patient seen and evaluated at bedside    Chief Complaint: Abdominal pain, nausea, vomiting    HPI:  This is a 61yo Male with PMHx of CABG x4 in [facility] ~3yrs ago, T2DM, HTN, HLD, who presented initially to [place] ED for one day of abdominal pain, nausea, vomiting. Found to have abnormal EKG and was transferred here for further evaluation.    Patient is accompanied by his daughter. Both ate samosas last night and have not been feeling well since then. Patient woke up ~1:30am with severe abdominal pain located in his epigastric region and radiates to his left chest wall and left side of his back. Also with 6 episodes of vomiting since then and loose stools. Daughter also with loose stools and nausea. Patient denies fevers, chills, shortness of breath, orthopnea, PND sxs, lower extremity edema. Patient reports that these sxs are different than what he had prior to needing his CABG.     EKG in ED shows sinus tach with LVH, STD I, II, V4-V6. Similar to EKG at [place]. No prior baseline for comparison.    Labs at OSH notable for Lipase of >30,000, WBC 20, Cr 2.27, , , Troponin i 234, Covid-19 negative.     Afebrile, HR 110s, BPs 110s/70s.        PMHx:   CABG  HTN  HLD  T2DM    PSHx:   CABG    Allergies:  No Known Allergies      Current Medications:   acetaminophen   IVPB .. 1000 milliGRAM(s) IV Intermittent Once  lactated ringers. 1000 milliLiter(s) IV Continuous <Continuous>  ondansetron Injectable 4 milliGRAM(s) IV Push once    Social History:  Smoking History: denies  Alcohol Use: denies  Drug Use: denies    REVIEW OF SYSTEMS:  Constitutional:     [x ] negative [ ] fevers [ ] chills [ ] weight loss [ ] weight gain  HEENT:                  [x ] negative [ ] dry eyes [ ] eye irritation [ ] postnasal drip [ ] nasal congestion  CV:                         [ x] negative  [ ] chest pain [ ] orthopnea [ ] palpitations [ ] murmur  Resp:                     [x ] negative [ ] cough [ ] shortness of breath [ ] dyspnea [ ] wheezing [ ] sputum [ ]hemoptysis  GI:                          [ ] negative [x ] nausea [ x] vomiting [x ] diarrhea [ ] constipation [ x] abd pain [ ] dysphagia   :                        [ x] negative [ ] dysuria [ ] nocturia [ ] hematuria [ ] increased urinary frequency  Musculoskeletal: [x ] negative [ ] back pain [ ] myalgias [ ] arthralgias [ ] fracture  Skin:                       [ x] negative [ ] rash [ ] itch  Neurological:        [ x] negative [ ] headache [ ] dizziness [ ] syncope [ ] weakness [ ] numbness  Psychiatric:           [ x] negative [ ] anxiety [ ] depression  Endocrine:            [ x] negative [ ] diabetes [ ] thyroid problem  Heme/Lymph:      [ x] negative [ ] anemia [ ] bleeding problem  Allergic/Immune: [ x] negative [ ] itchy eyes [ ] nasal discharge [ ] hives [ ] angioedema    [ x] All other systems negative  [ ] Unable to assess ROS due to      Physical Exam:  T(F): 98.1 (11-24), Max: 98.1 (11-24)  HR: 108 (11-24) (108 - 115)  BP: 122/64 (11-24) (122/64 - 123/63)  RR: 20 (11-24)  SpO2: 100% (11-24)  GENERAL: No acute distress  HEAD:  Atraumatic, Normocephalic  ENT: EOMI, conjunctiva and sclera clear, Neck supple, No JVD, moist mucosa  CHEST/LUNG: Clear to auscultation bilaterally; No wheeze, equal breath sounds bilaterally   HEART: Regular rhythm; tachycardic, No murmurs, rubs, or gallops  ABDOMEN: Soft, Tender in epigastric region, Nondistended; Bowel sounds present  EXTREMITIES:  No clubbing, cyanosis, or edema  PSYCH: Nl behavior, nl affect  NEUROLOGY: AAOx3, non-focal, cranial nerves intact  SKIN: Normal color, No rashes or lesions    Cardiovascular Diagnostic Testing:    ECG: Personally reviewed:  EKG in ED shows sinus tach with LVH, STD I, II, V4-V6. No prior baseline for comparison.    Labs: Personally reviewed

## 2022-11-24 NOTE — ED PROVIDER NOTE - PHYSICAL EXAMINATION
Vital Signs Reviewed  GEN: Uncomfortable, NAD, AAOx3  HEENT: NCAT, MMM, Neck Supple  RESP: CTAB, No rales/rhonchi/wheezing  CV: RRR, S1S2, No murmurs  ABD: No TTP, Soft, ND, No masses, No CVA Tenderness  Extrem/Skin: Equal pulses bilat, No cyanosis/edema/rashes  Neuro: No focal deficits

## 2022-11-24 NOTE — ED PROVIDER NOTE - CLINICAL SUMMARY MEDICAL DECISION MAKING FREE TEXT BOX
63 y/o male with pmhx of CABG, DM, HTN, HLD presenting as transfer from [place] for c/f STEMI; PTA arrival received 325 aspirin, 600 plavix, and no heparin drip started, hemodynamically stable, Trop 234 and lipase 30,000 at OSH, EKG showing elevation in AVR with diffuse ST depressions with no prior to compare to c/f pancreatitis vs. less suspicious for ACS/LEYLA; CTAP, lipase, lipid profile, cardiac enzymes, and cardiology consulted. Dissection considered but patient presents with no FND, equal pulses/BP b/l and back pain in setting of injury. 61 y/o male with pmhx of CABG, DM, HTN, HLD presenting as transfer from [place] for c/f STEMI; PTA arrival received 325 aspirin, 600 plavix, and no heparin drip started, hemodynamically stable, Trop 234 and lipase 30,000 at OSH, EKG showing elevation in AVR with diffuse ST depressions with no prior to compare to c/f pancreatitis vs. less suspicious for ACS/LEYLA; CTAP, lipase, lipid profile, cardiac enzymes, and cardiology consulted. Dissection considered but patient presents with no FND, equal pulses/BP b/l and back pain in setting of injury. 63 y/o male with pmhx of CABG, DM, HTN, HLD presenting as transfer from [place] for c/f STEMI; PTA arrival received 325 aspirin, 600 plavix, and no heparin drip started, hemodynamically stable, Trop 234 and lipase 30,000 at OSH, EKG showing elevation in AVR with diffuse ST depressions with no prior to compare to c/f pancreatitis vs. less suspicious for ACS/LEYLA; CTAP, lipase, lipid profile, cardiac enzymes, and cardiology consulted. Dissection considered but patient presents with no FND, equal pulses/BP b/l and back pain in setting of injury.

## 2022-11-24 NOTE — ED ADULT TRIAGE NOTE - CHIEF COMPLAINT QUOTE
as per daughter left sided abd. pain, nausea, bodyache,6 episodes of vomiting & 4 episodes of diarrhea x today at 1am

## 2022-11-24 NOTE — ED PROVIDER NOTE - ATTENDING CONTRIBUTION TO CARE
------------ATTENDING NOTE------------  pt w/ daughter brought to ED by EMS, transferred for concerns of chest pain/abd pain/NSTEMI, EKG from other ED w/ concerning L main LEYLA (elevated aVR, diffuse ST dep), concerning idiopathic pancreatitis c/o diffuse moderate abd pain w/ stabbing epigastric pain (no EtOH or known GB disease), has equal distal pulses, clear chest, Cardiology fellow at bedside on arrival, awaiting lab/imaging and close reassessments -->  - Arsalan Nielson MD   ----------------------------------------------- ------------ATTENDING NOTE------------  pt w/ daughter brought to ED by EMS, transferred for concerns of chest pain/abd pain/NSTEMI, EKG from other ED w/ concerning L main LEYLA (elevated aVR, diffuse ST dep), concerning idiopathic pancreatitis c/o diffuse moderate abd pain w/ stabbing epigastric pain (no EtOH or known GB disease), has equal distal pulses, clear chest, Cardiology fellow at bedside on arrival, awaiting lab/imaging and close reassessments --> (9:30) improving symptomatically, HD stable, dw pt and physician-daughter, awaiting full labs/imaging and close reassessments for planned admission -->  - Arsalan Nielson MD   -----------------------------------------------

## 2022-11-24 NOTE — ED PROVIDER NOTE - CARE PLAN
Principal Discharge DX:	Abdominal pain  Secondary Diagnosis:	Chest pain   1 Principal Discharge DX:	Acute pancreatitis  Secondary Diagnosis:	NSTEMI (non-ST elevation myocardial infarction)   Principal Discharge DX:	Acute pancreatitis  Secondary Diagnosis:	NSTEMI (non-ST elevation myocardial infarction)  Secondary Diagnosis:	Acute renal failure  Secondary Diagnosis:	Moderate dehydration   Principal Discharge DX:	Acute pancreatitis  Secondary Diagnosis:	NSTEMI (non-ST elevation myocardial infarction)  Secondary Diagnosis:	Acute renal failure  Secondary Diagnosis:	Moderate dehydration  Secondary Diagnosis:	Acute cholecystitis

## 2022-11-24 NOTE — CONSULT NOTE ADULT - ASSESSMENT
Patient is a 63 y/o male with PMH of CABG, DM, HTN, HLD presenting as transfer from [place] with initial c/f NSTEMI, given ASA and Plavix and transferred here. At [facility] found to have acute pancreatitis 2/2 gallstones, moderate on CT. SICU consulted due to increased troponins and concern for ACS.    PLAN:   Neurologic:  - A&Ox3  - Pain control with tylenol, IVP dilaudid as needed    Respiratory:  - no increased WOB    Cardiovascular: demand ischemia vs. ACS  - Initial troponin here 207, most recently 455  - Trend troponins q6h  - Lactate 2.7, trend  - No heparin for now as risks would likely outweigh benefits  - TTE in AM    Gastrointestinal/Nutrition: gallstone pancreatitis  - NPO    Genitourinary/Renal:  - Monitor I&Os  - LR @150cc/hr    Hematologic:  - DVT ppx    Infectious Disease:  - WBC 20, likely inflammatory response from pancreatitis    Endocrine:  - H/o DM, FS and ISS    Disposition: Patient is a 63 y/o male with PMH of CABG, DM, HTN, HLD presenting as transfer from [place] with initial c/f NSTEMI, given ASA and Plavix and transferred here. At [facility] found to have acute pancreatitis 2/2 gallstones, moderate on CT. SICU consulted due to increased troponins and concern for ACS.    PLAN:   Neurologic:  - A&Ox3  - Pain control with tylenol, IVP dilaudid as needed    Respiratory:  - no increased WOB    Cardiovascular: demand ischemia vs. ACS  - Initial troponin here 207, most recently 455  - Trend troponins q6h  - Lactate 2.7, trend  - No heparin for now as risks would likely outweigh benefits  - TTE in AM    Gastrointestinal/Nutrition: gallstone pancreatitis  - NPO    Genitourinary/Renal:  - Monitor I&Os  - LR @150cc/hr    Hematologic:  - DVT ppx    Infectious Disease:  - WBC 20, likely inflammatory response from pancreatitis    Endocrine:  - H/o DM, FS and ISS    Disposition: Patient is a 63 y/o male with PMH of CABG, DM, HTN, HLD presenting as transfer from [place] with initial c/f NSTEMI, given ASA and Plavix and transferred here. At [facility] found to have acute pancreatitis 2/2 gallstones, moderate on CT. SICU consulted due to increased troponins and concern for ACS.    PLAN:   Neurologic:  - A&Ox3  - Pain control with tylenol, IVP dilaudid as needed    Respiratory:  - no increased WOB    Cardiovascular: demand ischemia vs. ACS  - Initial troponin here 207, most recently 455  - Trend troponins q6h  - Lactate 2.7, trend  - No heparin for now as risks would likely outweigh benefits  - TTE in AM    Gastrointestinal/Nutrition: gallstone pancreatitis  - NPO    Genitourinary/Renal:  - Monitor I&Os  - LR @150cc/hr    Hematologic:  - DVT ppx    Infectious Disease:  - WBC 20, likely inflammatory response from pancreatitis    Endocrine:  - H/o DM, FS and ISS    Disposition:

## 2022-11-24 NOTE — CHART NOTE - NSCHARTNOTEFT_GEN_A_CORE
Called by primary team as patient with increased HS trops in the setting of acute pancreatitis with Acute interstitial edematous pancreatitis seen on CT. The patient does not have chest pain at this time. This presentation is unlikely to represent acute plaque rupture and thus the benefit of heparin drip outweigh the risk of bleeding in the setting of his acutely inflamed pancreas. Can c/w aspirin at this time. can continue to trend hstrops  Please call the cardiology fellow if the patient has any change in his clinical condition from a cardiovascular perspective.     Case Discussed with Dr. rizvi, interventional cardiology. Called by primary team as patient with increased HS trops in the setting of acute pancreatitis with Acute interstitial edematous pancreatitis seen on CT. The patient does not have chest pain at this time. This presentation is unlikely to represent acute plaque rupture and thus the benefit of heparin drip outweigh the risk of bleeding in the setting of his acutely inflamed pancreas. Can c/w aspirin at this time. can continue to trend hstrops. Discussed with Dr. Keith Parker, primary team.   Please call the cardiology fellow if the patient has any change in his clinical condition from a cardiovascular perspective.     Case Discussed with Dr. De La Rosa, interventional cardiology.

## 2022-11-24 NOTE — ED ADULT TRIAGE NOTE - NS ED NURSE AMBULANCES
[facility]

## 2022-11-24 NOTE — ED PROVIDER NOTE - SECONDARY DIAGNOSIS.
Chest pain NSTEMI (non-ST elevation myocardial infarction) Moderate dehydration Acute renal failure Acute cholecystitis

## 2022-11-24 NOTE — H&P ADULT - ATTENDING COMMENTS
The patient is a 62 year old male with a medical history significant for CAD (s/p CABG ~3yrs ago), HTN and DM who presents in transfer from [place] for r/o STEMI. Labs were remarkable for a WBC of 20, troponin of 86, a TB of 3.4 and a lipase >3000. US revealed a thickened gallbladder and small stones. Pt was felt to have gallstone pancreatitis with demand ischemia.    At [facility], he received 325 aspirin, 600 plavix, and no heparin drip started. Additionally, the patient had multiple episodes of non-bloody diarrhea and emesis with associated abdominal/chest pain (he was unable to localize the pain). Pt fevers/chills, cough, rashes, or changes in urination. Pt was seen by cardiology. His EKG reveals ischemic changes.     A/p  Gallstone pancreatitis  Demand ischemia with rising troponin  TTE now  Cardiology follow up  ?CCU admission  Continue ASA/Plavix  May benefit from heparin drip  GI consult  MRCP ordered  NPO/IVF/IV abx The patient is a 62 year old male with a medical history significant for CAD (s/p CABG ~3yrs ago), HTN and DM who presents in transfer from [place] for r/o STEMI. Labs were remarkable for a WBC of 20, troponin of 86, a TB of 3.4 and a lipase >3000. US revealed a thickened gallbladder and small stones. Pt was felt to have gallstone pancreatitis with demand ischemia.    At [facility], he received 325 aspirin, 600 plavix, and no heparin drip started. Additionally, the patient had multiple episodes of non-bloody diarrhea and emesis with associated abdominal/chest pain (he was unable to localize the pain). Pt fevers/chills, cough, rashes, or changes in urination. Pt was seen by cardiology. His EKG reveals ischemic changes.     A/p  Gallstone pancreatitis  Demand ischemia with rising troponin  TTE now  Cardiology follow up  ?CCU admission  Continue ASA/Plavix  May benefit from heparin drip  GI consult  MRCP ordered  NPO/IVF/IV abx The patient is a 62 year old male with a medical history significant for CAD (s/p CABG ~3yrs ago), HTN and DM who presents in transfer from [place] for r/o STEMI. Labs were remarkable for a WBC of 20, troponin of 86, a TB of 3.4 and a lipase >3000. US revealed a thickened gallbladder and small stones. Pt was felt to have gallstone pancreatitis with demand ischemia.    At [facility], he received 325 aspirin, 600 plavix, and no heparin drip started. Additionally, the patient had multiple episodes of non-bloody diarrhea and emesis with associated abdominal/chest pain (he was unable to localize the pain). Pt fevers/chills, cough, rashes, or changes in urination. Pt was seen by cardiology. His EKG reveals ischemic changes.     A/p  Gallstone pancreatitis  Demand ischemia with rising troponin  TTE now  Cardiology follow up  ?CCU admission  Continue ASA/Plavix  May benefit from heparin drip  GI consult  MRCP ordered  NPO/IVF/IV abx

## 2022-11-24 NOTE — CONSULT NOTE ADULT - ATTENDING COMMENTS
63 y/o man with CABG x4 in [facility] ~3yrs ago, T2DM, HTN, HLD, who presented to [place] ED for abdominal pain, nausea, vomiting. Found to have abnormal EKG and was transferred here for further evaluation.  --Patient with episodes of significant nausea, vomiting, and diarrhea and CT revealing acute interstitial edematous pancreatitis.  --ECG with evidence of ischemia changes but no prior ECG on record here--patient denies chest pain.  --elevated cardiac biomarkers, potentially demand ischemia related to active pancreatitis.  --Continue to trend cardiac enzymes until peak--monitor closely for signs of hemodynamic instability or heart failure.  --Check TTE today.  --Would continue ASA and plavix and attempt to attain more information re: h/o stents (daughter is a physician; patient apparently had procedures in [facility]).  --Treat underlying pancreatitis.  --Check repeat ECGs if any change in symptoms.  --Monitor closely on telemetry. 61 y/o man with CABG x4 in [facility] ~3yrs ago, T2DM, HTN, HLD, who presented to [place] ED for abdominal pain, nausea, vomiting. Found to have abnormal EKG and was transferred here for further evaluation.  --Patient with episodes of significant nausea, vomiting, and diarrhea and CT revealing acute interstitial edematous pancreatitis.  --ECG with evidence of ischemia changes but no prior ECG on record here--patient denies chest pain.  --elevated cardiac biomarkers, potentially demand ischemia related to active pancreatitis.  --Continue to trend cardiac enzymes until peak--monitor closely for signs of hemodynamic instability or heart failure.  --Check TTE today.  --Would continue ASA and plavix and attempt to attain more information re: h/o stents (daughter is a physician; patient apparently had procedures in [facility]).  --Treat underlying pancreatitis.  --Check repeat ECGs if any change in symptoms.  --Monitor closely on telemetry. 61 y/o man with CABG x4 in [facility] ~3yrs ago, T2DM, HTN, HLD, who presented to [place] ED for abdominal pain, nausea, vomiting. Found to have abnormal EKG and was transferred here for further evaluation.  --Patient with episodes of significant nausea, vomiting, and diarrhea and CT revealing acute interstitial edematous pancreatitis.  --ECG with evidence of ischemia changes but no prior ECG on record here--patient denies chest pain.  --elevated cardiac biomarkers, potentially demand ischemia related to active pancreatitis.  --Continue to trend cardiac enzymes until peak--monitor closely for signs of hemodynamic instability or heart failure.  --Check TTE today.  --Would continue ASA and plavix and attempt to attain more information re: h/o stents (daughter is a physician; patient apparently had procedures in [facility]).  --Treat underlying pancreatitis.  --Check repeat ECGs if any change in symptoms.  --Monitor closely on telemetry. 63 y/o man with CABG x4 in [facility] ~3yrs ago, T2DM, HTN, HLD, who presented to [place] ED for abdominal pain, nausea, vomiting. Found to have abnormal EKG and was transferred here for further evaluation.  --Patient with episodes of significant nausea, vomiting, and diarrhea and CT revealing acute interstitial edematous pancreatitis.  --ECG with evidence of ischemia changes but no prior ECG on record here--patient denies chest pain.  --elevated cardiac biomarkers, potentially demand ischemia related to active pancreatitis.  --Continue to trend cardiac enzymes until peak--monitor closely for signs of hemodynamic instability or heart failure.  --Check TTE today.  --Would continue ASA and plavix and attempt to attain more information re: CAD status (daughter is a physician; patient apparently had procedures in [facility]).  --Treat underlying pancreatitis.  --Check repeat ECGs if any change in symptoms.  --Monitor closely on telemetry. 61 y/o man with CABG x4 in [facility] ~3yrs ago, T2DM, HTN, HLD, who presented to [place] ED for abdominal pain, nausea, vomiting. Found to have abnormal EKG and was transferred here for further evaluation.  --Patient with episodes of significant nausea, vomiting, and diarrhea and CT revealing acute interstitial edematous pancreatitis.  --ECG with evidence of ischemia changes but no prior ECG on record here--patient denies chest pain.  --elevated cardiac biomarkers, potentially demand ischemia related to active pancreatitis.  --Continue to trend cardiac enzymes until peak--monitor closely for signs of hemodynamic instability or heart failure.  --Check TTE today.  --Would continue ASA and plavix and attempt to attain more information re: CAD status (daughter is a physician; patient apparently had procedures in [facility]).  --Treat underlying pancreatitis.  --Check repeat ECGs if any change in symptoms.  --Monitor closely on telemetry. 63 y/o man with CABG x4 in [facility] ~3yrs ago, T2DM, HTN, HLD, who presented to [place] ED for abdominal pain, nausea, vomiting. Found to have abnormal EKG and was transferred here for further evaluation.  --Patient with episodes of significant nausea, vomiting, and diarrhea and CT revealing acute interstitial edematous pancreatitis.  --ECG with evidence of ischemia changes but no prior ECG on record here--patient denies chest pain.  --elevated cardiac biomarkers, potentially demand ischemia related to active pancreatitis.  --Continue to trend cardiac enzymes until peak--monitor closely for signs of hemodynamic instability or heart failure.  --Check TTE today.  --Would continue ASA and plavix and attempt to attain more information re: CAD status (daughter is a physician; patient apparently had procedures in [facility]).  --Treat underlying pancreatitis.  --Check repeat ECGs if any change in symptoms.  --Monitor closely on telemetry.

## 2022-11-24 NOTE — ED ADULT NURSE NOTE - OBJECTIVE STATEMENT
61 yo male complaining of back pain for 4 days and today at 1am chest pain, nausea, vomiting (6 times), abdominal pain (generalized) and back pain. Pt arrived transferred from , alerted and oriented x3, with family at the bedside. Pt states abdominal pain at this moment. EKG done, presented, Second IV placed. Pt arrived without any drips, received  and 600 of plavix. Pt arrived with a 20G Ac. heart sounds normal, lungs clear, abdomen rounded, hard. Pt able to move all extremities, vitals WNL except heart rate. 63 yo male complaining of back pain for 4 days and today at 1am chest pain, nausea, vomiting (6 times), abdominal pain (generalized) and back pain. Pt arrived transferred from , alerted and oriented x3, with family at the bedside. Pt states abdominal pain at this moment. EKG done, presented, Second IV placed. Pt arrived without any drips, received  and 600 of plavix. Pt arrived with a 20G Ac. heart sounds normal, lungs clear, abdomen rounded, hard. Pt able to move all extremities, vitals WNL except heart rate.

## 2022-11-24 NOTE — PATIENT PROFILE ADULT - FALL HARM RISK - UNIVERSAL INTERVENTIONS
Bed in lowest position, wheels locked, appropriate side rails in place/Call bell, personal items and telephone in reach/Instruct patient to call for assistance before getting out of bed or chair/Non-slip footwear when patient is out of bed/[place] to call system/Physically safe environment - no spills, clutter or unnecessary equipment/Purposeful Proactive Rounding/Room/bathroom lighting operational, light cord in reach Bed in lowest position, wheels locked, appropriate side rails in place/Call bell, personal items and telephone in reach/Instruct patient to call for assistance before getting out of bed or chair/Non-slip footwear when patient is out of bed/[place] to call system/Physically safe environment - no spills, clutter or unnecessary equipment/Purposeful Proactive Rounding/Room/bathroom lighting operational, light cord in reach Bed in lowest position, wheels locked, appropriate side rails in place/Call bell, personal items and telephone in reach/Instruct patient to call for assistance before getting out of bed or chair/Non-slip footwear when patient is out of bed/[place] to call system/Physically safe environment - no spills, clutter or unnecessary equipment/Purposeful Proactive Rounding/Room/bathroom lighting operational, light cord in reach

## 2022-11-24 NOTE — PATIENT PROFILE ADULT - PRO INTERPRETER NEED 2
[facility]

## 2022-11-24 NOTE — CONSULT NOTE ADULT - SUBJECTIVE AND OBJECTIVE BOX
HISTORY OF PRESENT ILLNESS:    Patient is a 63 y/o male with PMH of CABG, DM, HTN, HLD presenting as transfer from [place] for c/f STEMI. PTA arrival received 325 aspirin, 600 plavix, and no heparin drip started. Pt transferred to [facility] and found to have gallstone pancreatitis.       PAST MEDICAL HISTORY:   - DM  - HTN  - HLD    PAST SURGICAL HISTORY:   - CABG    FAMILY HISTORY:     SOCIAL HISTORY:    CODE STATUS:     HOME MEDICATIONS:    ALLERGIES: No Known Allergies      VITAL SIGNS:  ICU Vital Signs Last 24 Hrs  T(C): 37.8 (24 Nov 2022 20:00), Max: 38 (24 Nov 2022 19:15)  T(F): 100 (24 Nov 2022 20:00), Max: 100.4 (24 Nov 2022 19:15)  HR: 112 (24 Nov 2022 20:00) (108 - 121)  BP: 133/68 (24 Nov 2022 20:00) (106/61 - 135/74)  BP(mean): 94 (24 Nov 2022 20:00) (94 - 98)  ABP: --  ABP(mean): --  RR: 32 (24 Nov 2022 20:00) (20 - 32)  SpO2: 94% (24 Nov 2022 20:00) (92% - 100%)    O2 Parameters below as of 24 Nov 2022 20:00  Patient On (Oxygen Delivery Method): nasal cannula  O2 Flow (L/min): 4          PHYSICAL EXAM:  Gen: NAD  Neuro: A&Ox3  Resp: no increased WOB, satting well on RA  Cardiac: appears well perfused, extremities warm  Abd: soft, nondistended. Minimally TTP in epigastrium      NEURO  Meds:acetaminophen   IVPB .. 1000 milliGRAM(s) IV Intermittent every 6 hours  HYDROmorphone  Injectable 0.5 milliGRAM(s) IV Push every 4 hours PRN Pain      RESPIRATORY  Mechanical Ventilation:     Meds:    CARDIOVASCULAR  VBG - ( 24 Nov 2022 07:55 )  pH: 7.31  /  pCO2: 48    /  pO2: 23    / HCO3: 24    / Base Excess: -2.6  /  SaO2: 28.1   Lactate: 2.7              Cardiac Rhythm: sinus  Meds:    GI/NUTRITION  Diet: NPO  Meds:pantoprazole  Injectable 40 milliGRAM(s) IV Push two times a day      GENITOURINARY/RENAL  Meds:lactated ringers. 1000 milliLiter(s) IV Continuous <Continuous>      11-24 @ 07:01  -  11-24 @ 20:34  --------------------------------------------------------  IN:    IV PiggyBack: 100 mL    Lactated Ringers: 150 mL  Total IN: 250 mL    OUT:    Voided (mL): 375 mL  Total OUT: 375 mL    Total NET: -125 mL        Weight (kg): 92.9 (11-24 @ 07:48)  11-24    133<L>  |  98  |  29<H>  ----------------------------<  270<H>  4.3   |  21<L>  |  2.05<H>    Ca    8.1<L>      24 Nov 2022 11:31  Mg     1.8     11-24    TPro  8.0  /  Alb  4.0  /  TBili  3.4<H>  /  DBili  x   /  AST  175<H>  /  ALT  115<H>  /  AlkPhos  117  11-24    [ ] De Dios catheter, indication: urine output monitoring in critically ill patient    HEMATOLOGIC  [ ] VTE Prophylaxis:                          15.0   20.02 )-----------( 218      ( 24 Nov 2022 07:57 )             46.8     PT/INR - ( 24 Nov 2022 07:57 )   PT: 12.0 sec;   INR: 1.04 ratio         PTT - ( 24 Nov 2022 07:57 )  PTT:25.0 sec  Transfusion: [ ] PRBC	[ ] Platelets	[ ] FFP	[ ] Cryoprecipitate      INFECTIOUS DISEASES  Meds:influenza   Vaccine 0.5 milliLiter(s) IntraMuscular once    RECENT CULTURES:      ENDOCRINE  Meds:  CAPILLARY BLOOD GLUCOSE      POCT Blood Glucose.: 233 mg/dL (24 Nov 2022 09:58)      PATIENT CARE ACCESS DEVICES:  [X] Peripheral IV  [ ] Central Venous Line	[ ] R	[ ] L	[ ] IJ	[ ] Fem	[ ] SC	Placed:   [ ] Arterial Line		[ ] R	[ ] L	[ ] Fem	[ ] Rad	[ ] Ax	Placed:   [ ] PICC:					[ ] Mediport  [ ] Urinary Catheter, Date Placed:   [x] Necessity of urinary, arterial, and venous catheters discussed    OTHER MEDICATIONS:     IMAGING STUDIES: HISTORY OF PRESENT ILLNESS:    Patient is a 61 y/o male with PMH of CABG, DM, HTN, HLD presenting as transfer from [place] for c/f STEMI. PTA arrival received 325 aspirin, 600 plavix, and no heparin drip started. Pt transferred to [facility] and found to have gallstone pancreatitis.       PAST MEDICAL HISTORY:   - DM  - HTN  - HLD    PAST SURGICAL HISTORY:   - CABG    FAMILY HISTORY:     SOCIAL HISTORY:    CODE STATUS:     HOME MEDICATIONS:    ALLERGIES: No Known Allergies      VITAL SIGNS:  ICU Vital Signs Last 24 Hrs  T(C): 37.8 (24 Nov 2022 20:00), Max: 38 (24 Nov 2022 19:15)  T(F): 100 (24 Nov 2022 20:00), Max: 100.4 (24 Nov 2022 19:15)  HR: 112 (24 Nov 2022 20:00) (108 - 121)  BP: 133/68 (24 Nov 2022 20:00) (106/61 - 135/74)  BP(mean): 94 (24 Nov 2022 20:00) (94 - 98)  ABP: --  ABP(mean): --  RR: 32 (24 Nov 2022 20:00) (20 - 32)  SpO2: 94% (24 Nov 2022 20:00) (92% - 100%)    O2 Parameters below as of 24 Nov 2022 20:00  Patient On (Oxygen Delivery Method): nasal cannula  O2 Flow (L/min): 4          PHYSICAL EXAM:  Gen: NAD  Neuro: A&Ox3  Resp: no increased WOB, satting well on RA  Cardiac: appears well perfused, extremities warm  Abd: soft, nondistended. Minimally TTP in epigastrium      NEURO  Meds:acetaminophen   IVPB .. 1000 milliGRAM(s) IV Intermittent every 6 hours  HYDROmorphone  Injectable 0.5 milliGRAM(s) IV Push every 4 hours PRN Pain      RESPIRATORY  Mechanical Ventilation:     Meds:    CARDIOVASCULAR  VBG - ( 24 Nov 2022 07:55 )  pH: 7.31  /  pCO2: 48    /  pO2: 23    / HCO3: 24    / Base Excess: -2.6  /  SaO2: 28.1   Lactate: 2.7              Cardiac Rhythm: sinus  Meds:    GI/NUTRITION  Diet: NPO  Meds:pantoprazole  Injectable 40 milliGRAM(s) IV Push two times a day      GENITOURINARY/RENAL  Meds:lactated ringers. 1000 milliLiter(s) IV Continuous <Continuous>      11-24 @ 07:01  -  11-24 @ 20:34  --------------------------------------------------------  IN:    IV PiggyBack: 100 mL    Lactated Ringers: 150 mL  Total IN: 250 mL    OUT:    Voided (mL): 375 mL  Total OUT: 375 mL    Total NET: -125 mL        Weight (kg): 92.9 (11-24 @ 07:48)  11-24    133<L>  |  98  |  29<H>  ----------------------------<  270<H>  4.3   |  21<L>  |  2.05<H>    Ca    8.1<L>      24 Nov 2022 11:31  Mg     1.8     11-24    TPro  8.0  /  Alb  4.0  /  TBili  3.4<H>  /  DBili  x   /  AST  175<H>  /  ALT  115<H>  /  AlkPhos  117  11-24    [ ] De Dios catheter, indication: urine output monitoring in critically ill patient    HEMATOLOGIC  [ ] VTE Prophylaxis:                          15.0   20.02 )-----------( 218      ( 24 Nov 2022 07:57 )             46.8     PT/INR - ( 24 Nov 2022 07:57 )   PT: 12.0 sec;   INR: 1.04 ratio         PTT - ( 24 Nov 2022 07:57 )  PTT:25.0 sec  Transfusion: [ ] PRBC	[ ] Platelets	[ ] FFP	[ ] Cryoprecipitate      INFECTIOUS DISEASES  Meds:influenza   Vaccine 0.5 milliLiter(s) IntraMuscular once    RECENT CULTURES:      ENDOCRINE  Meds:  CAPILLARY BLOOD GLUCOSE      POCT Blood Glucose.: 233 mg/dL (24 Nov 2022 09:58)      PATIENT CARE ACCESS DEVICES:  [X] Peripheral IV  [ ] Central Venous Line	[ ] R	[ ] L	[ ] IJ	[ ] Fem	[ ] SC	Placed:   [ ] Arterial Line		[ ] R	[ ] L	[ ] Fem	[ ] Rad	[ ] Ax	Placed:   [ ] PICC:					[ ] Mediport  [ ] Urinary Catheter, Date Placed:   [x] Necessity of urinary, arterial, and venous catheters discussed    OTHER MEDICATIONS:     IMAGING STUDIES: HISTORY OF PRESENT ILLNESS:    Patient is a 61 y/o male with PMH of CABG, DM, HTN, HLD presenting as transfer from [place] for c/f STEMI. PTA arrival received 325 aspirin, 600 plavix, and no heparin drip started. Pt transferred to [facility] and found to have gallstone pancreatitis.       PAST MEDICAL HISTORY:   - DM  - HTN  - HLD    PAST SURGICAL HISTORY:   - CABG    FAMILY HISTORY:     SOCIAL HISTORY:    CODE STATUS:     HOME MEDICATIONS:    ALLERGIES: No Known Allergies      VITAL SIGNS:  ICU Vital Signs Last 24 Hrs  T(C): 37.8 (24 Nov 2022 20:00), Max: 38 (24 Nov 2022 19:15)  T(F): 100 (24 Nov 2022 20:00), Max: 100.4 (24 Nov 2022 19:15)  HR: 112 (24 Nov 2022 20:00) (108 - 121)  BP: 133/68 (24 Nov 2022 20:00) (106/61 - 135/74)  BP(mean): 94 (24 Nov 2022 20:00) (94 - 98)  ABP: --  ABP(mean): --  RR: 32 (24 Nov 2022 20:00) (20 - 32)  SpO2: 94% (24 Nov 2022 20:00) (92% - 100%)    O2 Parameters below as of 24 Nov 2022 20:00  Patient On (Oxygen Delivery Method): nasal cannula  O2 Flow (L/min): 4          PHYSICAL EXAM:  Gen: NAD  Neuro: A&Ox3  Resp: no increased WOB, satting well on RA  Cardiac: appears well perfused, extremities warm  Abd: soft, nondistended. Minimally TTP in epigastrium      NEURO  Meds:acetaminophen   IVPB .. 1000 milliGRAM(s) IV Intermittent every 6 hours  HYDROmorphone  Injectable 0.5 milliGRAM(s) IV Push every 4 hours PRN Pain      RESPIRATORY  Mechanical Ventilation:     Meds:    CARDIOVASCULAR  VBG - ( 24 Nov 2022 07:55 )  pH: 7.31  /  pCO2: 48    /  pO2: 23    / HCO3: 24    / Base Excess: -2.6  /  SaO2: 28.1   Lactate: 2.7              Cardiac Rhythm: sinus  Meds:    GI/NUTRITION  Diet: NPO  Meds:pantoprazole  Injectable 40 milliGRAM(s) IV Push two times a day      GENITOURINARY/RENAL  Meds:lactated ringers. 1000 milliLiter(s) IV Continuous <Continuous>      11-24 @ 07:01  -  11-24 @ 20:34  --------------------------------------------------------  IN:    IV PiggyBack: 100 mL    Lactated Ringers: 150 mL  Total IN: 250 mL    OUT:    Voided (mL): 375 mL  Total OUT: 375 mL    Total NET: -125 mL        Weight (kg): 92.9 (11-24 @ 07:48)  11-24    133<L>  |  98  |  29<H>  ----------------------------<  270<H>  4.3   |  21<L>  |  2.05<H>    Ca    8.1<L>      24 Nov 2022 11:31  Mg     1.8     11-24    TPro  8.0  /  Alb  4.0  /  TBili  3.4<H>  /  DBili  x   /  AST  175<H>  /  ALT  115<H>  /  AlkPhos  117  11-24    [ ] De Dios catheter, indication: urine output monitoring in critically ill patient    HEMATOLOGIC  [ ] VTE Prophylaxis:                          15.0   20.02 )-----------( 218      ( 24 Nov 2022 07:57 )             46.8     PT/INR - ( 24 Nov 2022 07:57 )   PT: 12.0 sec;   INR: 1.04 ratio         PTT - ( 24 Nov 2022 07:57 )  PTT:25.0 sec  Transfusion: [ ] PRBC	[ ] Platelets	[ ] FFP	[ ] Cryoprecipitate      INFECTIOUS DISEASES  Meds:influenza   Vaccine 0.5 milliLiter(s) IntraMuscular once    RECENT CULTURES:      ENDOCRINE  Meds:  CAPILLARY BLOOD GLUCOSE      POCT Blood Glucose.: 233 mg/dL (24 Nov 2022 09:58)      PATIENT CARE ACCESS DEVICES:  [X] Peripheral IV  [ ] Central Venous Line	[ ] R	[ ] L	[ ] IJ	[ ] Fem	[ ] SC	Placed:   [ ] Arterial Line		[ ] R	[ ] L	[ ] Fem	[ ] Rad	[ ] Ax	Placed:   [ ] PICC:					[ ] Mediport  [ ] Urinary Catheter, Date Placed:   [x] Necessity of urinary, arterial, and venous catheters discussed    OTHER MEDICATIONS:     IMAGING STUDIES:

## 2022-11-24 NOTE — CONSULT NOTE ADULT - CRITICAL CARE ATTENDING COMMENT
two primary concerns  obvious pancreatitis as noted by CT scan, patients epigastric symptoms, and markedly elevated Lipase  elevated troponin which could be related to demand ischemia but overall unclear  trending troponin - facilitating input from cardiology  started ASA Plavix, started heparin drip  providing supplemental oxygen for hypoxia  cardiac echo performed- facilitating interpretation of results

## 2022-11-24 NOTE — H&P ADULT - ASSESSMENT
61 y/o male with PMH of CABG, DM, HTN, HLD presents with abdominal pain, diarrhea and n/v for 1 day. Found to have acute cholecystitis, gallstone pancreatitis.    Plan:    - Admit to surgery to Dr. Rondon  - NPO/IVF  - IV antibiotics  - AM labs  - Pain control PRN  - DVT ppx  - GI consult    Patient seen and examined. Plan discussed with Dr. Nela Lopez MD, PGY2  ACS/Trauma Surgery  [id number]  61 y/o male with PMH of CABG, DM, HTN, HLD presents with abdominal pain, diarrhea and n/v for 1 day. Found to have acute cholecystitis, gallstone pancreatitis.    Plan:    - Admit to surgery to Dr. Rondon  - NPO/IVF  - IV antibiotics  - AM labs  - Pain control PRN  - DVT ppx  - GI consult    Patient seen and examined. Plan discussed with Dr. Nela Lopez MD, PGY2  ACS/Trauma Surgery  [phone]  63 y/o male with PMH of CABG, DM, HTN, HLD presents with abdominal pain, diarrhea and n/v for 1 day. Found to have acute cholecystitis, gallstone pancreatitis.    Plan:    - Admit to surgery to Dr. Rondon  - NPO/IVF  - IV antibiotics  - AM labs  - Pain control PRN  - DVT ppx  - GI consult    Patient seen and examined. Plan discussed with Dr. Nela Lopez MD, PGY2  ACS/Trauma Surgery  [phone]

## 2022-11-24 NOTE — H&P ADULT - HISTORY OF PRESENT ILLNESS
Patient is a 61 y/o male with PMH of CABG, DM, HTN, HLD presenting as transfer from [place] for c/f STEMI. PTA arrival received 325 aspirin, 600 plavix, and no heparin drip started. Around 1:30 am patient had multiple episodes of non-bloody diarrhea and emesis with associated abdominal pain and chest pain, unable to localize, non-radiating, with associated SOB and no associated palpitations or diaphoresis. No recent fevers/chills, cough, rashes, or changes in urination. Does report mild diffuse back pain; started 5 days ago in setting on injury while walking and lifting objects. Denies focal weakness, numbness/tingling, or LOC due does report mild dizziness.    Patient seen and examined in ED. Hemodynamically stable, alert and awake, A&Ox3. Daughter at bedside. Reports abdominal pain, and nausea. Abdomen is soft, tender in epigastric area and RUQ. Denies chest pain, SOB. WBC 20, T.bili 3.4, Lipase 300. RUQ US demonstrated gallbladder stones, pericholecystic fluid.  Patient is a 63 y/o male with PMH of CABG, DM, HTN, HLD presenting as transfer from [place] for c/f STEMI. PTA arrival received 325 aspirin, 600 plavix, and no heparin drip started. Around 1:30 am patient had multiple episodes of non-bloody diarrhea and emesis with associated abdominal pain and chest pain, unable to localize, non-radiating, with associated SOB and no associated palpitations or diaphoresis. No recent fevers/chills, cough, rashes, or changes in urination. Does report mild diffuse back pain; started 5 days ago in setting on injury while walking and lifting objects. Denies focal weakness, numbness/tingling, or LOC due does report mild dizziness.    Patient seen and examined in ED. Hemodynamically stable, alert and awake, A&Ox3. Daughter at bedside. Reports abdominal pain, and nausea. Abdomen is soft, tender in epigastric area and RUQ. Denies chest pain, SOB. WBC 20, T.bili 3.4, Lipase 300. RUQ US demonstrated gallbladder stones, pericholecystic fluid.  Patient is a 61 y/o male with PMH of CABG, DM, HTN, HLD presenting as transfer from [place] for c/f STEMI. PTA arrival received 325 aspirin, 600 plavix, and no heparin drip started. Around 1:30 am patient had multiple episodes of non-bloody diarrhea and emesis with associated abdominal pain and chest pain, unable to localize, non-radiating, with associated SOB and no associated palpitations or diaphoresis. No recent fevers/chills, cough, rashes, or changes in urination. Does report mild diffuse back pain; started 5 days ago in setting on injury while walking and lifting objects. Denies focal weakness, numbness/tingling, or LOC due does report mild dizziness.    Patient seen and examined in ED. Hemodynamically stable, alert and awake, A&Ox3. Daughter at bedside. Reports abdominal pain, and nausea. Abdomen is soft, tender in epigastric area and RUQ. Denies chest pain, SOB. WBC 20, T.bili 3.4, Lipase 300. RUQ US demonstrated gallbladder stones, pericholecystic fluid.

## 2022-11-24 NOTE — CONSULT NOTE ADULT - ASSESSMENT
This is a 61yo Male with PMHx of CABG x4 in [facility] ~3yrs ago, T2DM, HTN, HLD, who presented initially to [place] ED for one day of abdominal pain, nausea, vomiting. Found to have abnormal EKG and was transferred here for further evaluation. Labs at OSH notable for Lipase of >30,000, WBC 20, Cr 2.27, , , Troponin I 234, Covid-19 negative. Afebrile, HR 110s, BPs 110s/70s.      1. Abnormal EKG, elevated Troponin  - EKG in ED shows sinus tach with LVH, STD I, II, V4-V6. Similar to EKG at [place]. No prior baseline for comparison.   - Troponin I 234 at OSH  - Trend cardiac enzymes to peak  - TTE  - Low suspicion for ACS at this time. Elevated cardiac enzymes likely in setting of acute pancreatitis  - Discussed with Dr. De La Rosa, Interventional Cardiology   - Monitor on Telemetry     2. Pancreatitis  - Lipase > 30,000 at OSH. CT Abd pending. Further workup by primary team    3. CABG  - Continue Aspirin 81mg   - TTE as above     Anant Jett MD  Cardiology Fellow - PGY 5 This is a 61yo Male with PMHx of CABG x4 in [facility] ~3yrs ago, T2DM, HTN, HLD, who presented initially to [place] ED for one day of abdominal pain, nausea, vomiting. Found to have abnormal EKG and was transferred here for further evaluation. Labs at OSH notable for Lipase of >30,000, WBC 20, Cr 2.27, , , Troponin I 234, Covid-19 negative. Afebrile, HR 110s, BPs 110s/70s.      1. Abnormal EKG, elevated Troponin  - EKG in ED shows sinus tach with LVH, STD I, II, V4-V6. Similar to EKG at [place]. No prior baseline for comparison.   - Troponin I 234 at OSH  - Trend cardiac enzymes to peak  - TTE  - Low suspicion for ACS at this time. Elevated cardiac enzymes likely in setting of acute pancreatitis  - Discussed with Dr. De La Rosa, Interventional Cardiology   - Monitor on Telemetry     2. Pancreatitis  - Lipase > 30,000 at OSH. CT Abd pending. Further workup by primary team    3. CABG  - Continue Aspirin 81mg   - TTE as above     Anant Jett MD  Cardiology Fellow - PGY 5 This is a 63yo Male with PMHx of CABG x4 in [facility] ~3yrs ago, T2DM, HTN, HLD, who presented initially to [place] ED for one day of abdominal pain, nausea, vomiting. Found to have abnormal EKG and was transferred here for further evaluation. Labs at OSH notable for Lipase of >30,000, WBC 20, Cr 2.27, , , Troponin I 234, Covid-19 negative. Afebrile, HR 110s, BPs 110s/70s.      1. Abnormal EKG, elevated Troponin  - EKG in ED shows sinus tach with LVH, STD I, II, V4-V6. Similar to EKG at [place]. No prior baseline for comparison.   - Troponin I 234 at OSH  - Trend cardiac enzymes to peak  - TTE  - Low suspicion for ACS at this time. Elevated cardiac enzymes likely in setting of acute pancreatitis  - Discussed with Dr. De La Rosa, Interventional Cardiology   - Monitor on Telemetry     2. Pancreatitis  - Lipase > 30,000 at OSH. CT Abd pending. Further workup by primary team    3. CABG  - Continue Aspirin 81mg   - TTE as above     Anant Jett MD  Cardiology Fellow - PGY 5 This is a 63yo Male with PMHx of CABG x4 in [facility] ~3yrs ago, T2DM, HTN, HLD, who presented initially to [place] ED for one day of abdominal pain, nausea, vomiting. Found to have abnormal EKG and was transferred here for further evaluation. Labs at OSH notable for Lipase of >30,000, WBC 20, Cr 2.27, , , Troponin I 234, Covid-19 negative. Afebrile, HR 110s, BPs 110s/70s.      1. Abnormal EKG, elevated Troponin  - EKG in ED shows sinus tach with LVH, STD I, II, V4-V6. Similar to EKG at [place]. No prior baseline for comparison.   - Troponin I 234 at OSH  - Trend cardiac enzymes to peak  - TTE  - Low suspicion for ACS at this time. Elevated cardiac enzymes likely in setting of acute pancreatitis. Loaded with Aspirin, Plavix, Heparin gtt at OSH. Continue Aspirin 81mg. Ok to hold Plavix and Heparin gtt  - Discussed with Dr. De La Rosa, Interventional Cardiology   - Monitor on Telemetry     2. Pancreatitis  - Lipase > 30,000 at OSH. CT Abd pending. Further workup by primary team    3. CABG  - Continue Aspirin 81mg   - TTE as above     Anant Jett MD  Cardiology Fellow - PGY 5 This is a 63yo Male with PMHx of CABG x4 in [facility] ~3yrs ago, T2DM, HTN, HLD, who presented initially to [place] ED for one day of abdominal pain, nausea, vomiting. Found to have abnormal EKG and was transferred here for further evaluation. Labs at OSH notable for Lipase of >30,000, WBC 20, Cr 2.27, , , Troponin I 234, Covid-19 negative. Afebrile, HR 110s, BPs 110s/70s.      1. Abnormal EKG, elevated Troponin  - EKG in ED shows sinus tach with LVH, STD I, II, V4-V6. Similar to EKG at [place]. No prior baseline for comparison.   - Troponin I 234 at OSH  - Trend cardiac enzymes to peak  - TTE  - Low suspicion for ACS at this time. Elevated cardiac enzymes likely in setting of acute pancreatitis. Loaded with Aspirin, Plavix, Heparin gtt at OSH. Continue Aspirin 81mg. Ok to hold Plavix and Heparin gtt  - Discussed with Dr. De La Rosa, Interventional Cardiology   - Monitor on Telemetry     2. Pancreatitis  - Lipase > 30,000 at OSH. CT Abd pending. Further workup by primary team    3. CABG  - Continue Aspirin 81mg   - TTE as above     Anant Jett MD  Cardiology Fellow - PGY 5 This is a 63yo Male with PMHx of CABG x4 in [facility] ~3yrs ago, T2DM, HTN, HLD, who presented initially to [place] ED for one day of abdominal pain, nausea, vomiting. Found to have abnormal EKG and was transferred here for further evaluation. Labs at OSH notable for Lipase of >30,000, WBC 20, Cr 2.27, , , Troponin I 234, Covid-19 negative. Afebrile, HR 110s, BPs 110s/70s.      1. Abnormal EKG, elevated Troponin  - EKG in ED shows sinus tach with LVH, STD I, II, V4-V6. Similar to EKG at [place]. No prior baseline for comparison.   - Troponin I 234 at OSH  - Trend cardiac enzymes to peak  - TTE  - Low suspicion for ACS at this time. Elevated cardiac enzymes likely in setting of acute pancreatitis. Loaded with Aspirin, Plavix, Heparin gtt at OSH. Continue Aspirin 81mg. Ok to hold Plavix and Heparin gtt  - Discussed with Dr. De La Rosa, Interventional Cardiology   - Monitor on Telemetry     2. Pancreatitis  - Lipase > 30,000 at OSH. CT Abd pending. Further workup by primary team    3. CABG  - Continue Aspirin 81mg   - TTE as above     Anant Jett MD  Cardiology Fellow - PGY 5 This is a 63yo Male with PMHx of CABG x4 in [facility] ~3yrs ago, T2DM, HTN, HLD, who presented initially to [place] ED for one day of abdominal pain, nausea, vomiting. Found to have abnormal EKG and was transferred here for further evaluation. Labs at OSH notable for Lipase of >30,000, WBC 20, Cr 2.27, , , Troponin I 234, Covid-19 negative. Afebrile, HR 110s, BPs 110s/70s.      1. Abnormal EKG, elevated Troponin  - EKG in ED shows sinus tach with LVH, STD I, II, V4-V6. Similar to EKG at [place]. No prior baseline for comparison.   - Troponin I 234 at OSH  - Trend cardiac enzymes to peak  - TTE  - Low suspicion for ACS at this time. Elevated cardiac enzymes likely in setting of acute pancreatitis. Loaded with Aspirin, Plavix, Heparin gtt at OSH. Continue Aspirin 81mg and plavix; okay to hold Heparin gtt as unlikely primary ACS.  - Discussed with Dr. De La Rosa, Interventional Cardiology   - Monitor on Telemetry     2. Pancreatitis  - Lipase > 30,000 at OSH. CT Abd pending. Further workup by primary team    3. CABG  - Continue Aspirin 81mg   - TTE as above     Anant Jett MD  Cardiology Fellow - PGY 5 This is a 63yo Male with PMHx of CABG x4 in [facility] ~3yrs ago, T2DM, HTN, HLD, who presented initially to [place] ED for one day of abdominal pain, nausea, vomiting. Found to have abnormal EKG and was transferred here for further evaluation. Labs at OSH notable for Lipase of >30,000, WBC 20, Cr 2.27, , , Troponin I 234, Covid-19 negative. Afebrile, HR 110s, BPs 110s/70s.      1. Abnormal EKG, elevated Troponin  - EKG in ED shows sinus tach with LVH, STD I, II, V4-V6. Similar to EKG at [place]. No prior baseline for comparison.   - Troponin I 234 at OSH  - Trend cardiac enzymes to peak  - TTE  - Low suspicion for ACS at this time. Elevated cardiac enzymes likely in setting of acute pancreatitis. Loaded with Aspirin, Plavix, Heparin gtt at OSH. Continue Aspirin 81mg and plavix; okay to hold Heparin gtt as unlikely primary ACS.  - Discussed with Dr. De La Rosa, Interventional Cardiology   - Monitor on Telemetry     2. Pancreatitis  - Lipase > 30,000 at OSH. CT Abd pending. Further workup by primary team    3. CABG  - Continue Aspirin 81mg   - TTE as above     Anant Jett MD  Cardiology Fellow - PGY 5 This is a 63yo Male with PMHx of CABG x4 in [facility] ~3yrs ago, T2DM, HTN, HLD, who presented initially to [place] ED for one day of abdominal pain, nausea, vomiting. Found to have abnormal EKG and was transferred here for further evaluation. Labs at OSH notable for Lipase of >30,000, WBC 20, Cr 2.27, , , Troponin I 234, Covid-19 negative. Afebrile, HR 110s, BPs 110s/70s.      1. Abnormal EKG, elevated Troponin  - EKG in ED shows sinus tach with LVH, STD I, II, V4-V6. Similar to EKG at [place]. No prior baseline for comparison.   - Troponin I 234 at OSH  - Trend cardiac enzymes to peak  - TTE  - Low suspicion for ACS at this time. Elevated cardiac enzymes likely in setting of acute pancreatitis. Loaded with Aspirin, Plavix, Heparin gtt at OSH. Continue Aspirin 81mg and plavix; okay to hold Heparin gtt as unlikely primary ACS.  - Discussed with Dr. De La Rosa, Interventional Cardiology   - Monitor on Telemetry     2. Pancreatitis  - Lipase > 30,000 at OSH. CT Abd pending. Further workup by primary team    3. CABG  - Continue Aspirin 81mg   - TTE as above     Anant Jett MD  Cardiology Fellow - PGY 5

## 2022-11-25 DIAGNOSIS — K85.10 BILIARY ACUTE PANCREATITIS WITHOUT NECROSIS OR INFECTION: ICD-10-CM

## 2022-11-25 DIAGNOSIS — E11.9 TYPE 2 DIABETES MELLITUS WITHOUT COMPLICATIONS: ICD-10-CM

## 2022-11-25 DIAGNOSIS — Z29.9 ENCOUNTER FOR PROPHYLACTIC MEASURES, UNSPECIFIED: ICD-10-CM

## 2022-11-25 DIAGNOSIS — I10 ESSENTIAL (PRIMARY) HYPERTENSION: ICD-10-CM

## 2022-11-25 DIAGNOSIS — J96.01 ACUTE RESPIRATORY FAILURE WITH HYPOXIA: ICD-10-CM

## 2022-11-25 DIAGNOSIS — E78.5 HYPERLIPIDEMIA, UNSPECIFIED: ICD-10-CM

## 2022-11-25 DIAGNOSIS — Z79.899 OTHER LONG TERM (CURRENT) DRUG THERAPY: ICD-10-CM

## 2022-11-25 DIAGNOSIS — R77.8 OTHER SPECIFIED ABNORMALITIES OF PLASMA PROTEINS: ICD-10-CM

## 2022-11-25 DIAGNOSIS — I25.10 ATHEROSCLEROTIC HEART DISEASE OF NATIVE CORONARY ARTERY WITHOUT ANGINA PECTORIS: ICD-10-CM

## 2022-11-25 DIAGNOSIS — I21.4 NON-ST ELEVATION (NSTEMI) MYOCARDIAL INFARCTION: ICD-10-CM

## 2022-11-25 LAB
A1C WITH ESTIMATED AVERAGE GLUCOSE RESULT: 7.3 % — HIGH (ref 4–5.6)
ALBUMIN SERPL ELPH-MCNC: 2 G/DL — LOW (ref 3.3–5)
ALBUMIN SERPL ELPH-MCNC: 2.2 G/DL — LOW (ref 3.3–5)
ALBUMIN SERPL ELPH-MCNC: 2.4 G/DL — LOW (ref 3.3–5)
ALBUMIN SERPL ELPH-MCNC: 3 G/DL — LOW (ref 3.3–5)
ALBUMIN SERPL ELPH-MCNC: 3.1 G/DL — LOW (ref 3.3–5)
ALP SERPL-CCNC: 62 U/L — SIGNIFICANT CHANGE UP (ref 40–120)
ALP SERPL-CCNC: 66 U/L — SIGNIFICANT CHANGE UP (ref 40–120)
ALP SERPL-CCNC: 73 U/L — SIGNIFICANT CHANGE UP (ref 40–120)
ALP SERPL-CCNC: 80 U/L — SIGNIFICANT CHANGE UP (ref 40–120)
ALP SERPL-CCNC: 82 U/L — SIGNIFICANT CHANGE UP (ref 40–120)
ALT FLD-CCNC: 107 U/L — HIGH (ref 10–45)
ALT FLD-CCNC: 283 U/L — HIGH (ref 10–45)
ALT FLD-CCNC: 407 U/L — HIGH (ref 10–45)
ALT FLD-CCNC: 60 U/L — HIGH (ref 10–45)
ALT FLD-CCNC: 90 U/L — HIGH (ref 10–45)
ANION GAP SERPL CALC-SCNC: 10 MMOL/L — SIGNIFICANT CHANGE UP (ref 5–17)
ANION GAP SERPL CALC-SCNC: 12 MMOL/L — SIGNIFICANT CHANGE UP (ref 5–17)
ANION GAP SERPL CALC-SCNC: 13 MMOL/L — SIGNIFICANT CHANGE UP (ref 5–17)
ANION GAP SERPL CALC-SCNC: 28 MMOL/L — HIGH (ref 5–17)
ANION GAP SERPL CALC-SCNC: 31 MMOL/L — HIGH (ref 5–17)
APPEARANCE UR: ABNORMAL
APTT BLD: 141 SEC — CRITICAL HIGH (ref 27.5–35.5)
APTT BLD: 44.1 SEC — HIGH (ref 27.5–35.5)
APTT BLD: 87.4 SEC — HIGH (ref 27.5–35.5)
AST SERPL-CCNC: 167 U/L — HIGH (ref 10–40)
AST SERPL-CCNC: 243 U/L — HIGH (ref 10–40)
AST SERPL-CCNC: 262 U/L — HIGH (ref 10–40)
AST SERPL-CCNC: 476 U/L — HIGH (ref 10–40)
AST SERPL-CCNC: 666 U/L — HIGH (ref 10–40)
BACTERIA # UR AUTO: NEGATIVE — SIGNIFICANT CHANGE UP
BASOPHILS # BLD AUTO: 0 K/UL — SIGNIFICANT CHANGE UP (ref 0–0.2)
BASOPHILS NFR BLD AUTO: 0 % — SIGNIFICANT CHANGE UP (ref 0–2)
BILIRUB SERPL-MCNC: 0.8 MG/DL — SIGNIFICANT CHANGE UP (ref 0.2–1.2)
BILIRUB SERPL-MCNC: 1.1 MG/DL — SIGNIFICANT CHANGE UP (ref 0.2–1.2)
BILIRUB SERPL-MCNC: 1.5 MG/DL — HIGH (ref 0.2–1.2)
BILIRUB SERPL-MCNC: 1.8 MG/DL — HIGH (ref 0.2–1.2)
BILIRUB SERPL-MCNC: 2.3 MG/DL — HIGH (ref 0.2–1.2)
BILIRUB UR-MCNC: NEGATIVE — SIGNIFICANT CHANGE UP
BUN SERPL-MCNC: 27 MG/DL — HIGH (ref 7–23)
BUN SERPL-MCNC: 31 MG/DL — HIGH (ref 7–23)
BUN SERPL-MCNC: 32 MG/DL — HIGH (ref 7–23)
BUN SERPL-MCNC: 35 MG/DL — HIGH (ref 7–23)
CALCIUM SERPL-MCNC: 10 MG/DL — SIGNIFICANT CHANGE UP (ref 8.4–10.5)
CALCIUM SERPL-MCNC: 8.1 MG/DL — LOW (ref 8.4–10.5)
CALCIUM SERPL-MCNC: 8.6 MG/DL — SIGNIFICANT CHANGE UP (ref 8.4–10.5)
CALCIUM SERPL-MCNC: 9.7 MG/DL — SIGNIFICANT CHANGE UP (ref 8.4–10.5)
CALCIUM SERPL-MCNC: 9.8 MG/DL — SIGNIFICANT CHANGE UP (ref 8.4–10.5)
CHLORIDE SERPL-SCNC: 101 MMOL/L — SIGNIFICANT CHANGE UP (ref 96–108)
CHLORIDE SERPL-SCNC: 104 MMOL/L — SIGNIFICANT CHANGE UP (ref 96–108)
CHLORIDE SERPL-SCNC: 97 MMOL/L — SIGNIFICANT CHANGE UP (ref 96–108)
CHLORIDE SERPL-SCNC: 99 MMOL/L — SIGNIFICANT CHANGE UP (ref 96–108)
CK MB BLD-MCNC: 7.1 % — HIGH (ref 0–3.5)
CK MB BLD-MCNC: 9.8 % — HIGH (ref 0–3.5)
CK MB CFR SERPL CALC: 113.4 NG/ML — HIGH (ref 0–6.7)
CK MB CFR SERPL CALC: 197.9 NG/ML — HIGH (ref 0–6.7)
CK SERPL-CCNC: 1598 U/L — HIGH (ref 30–200)
CK SERPL-CCNC: 2029 U/L — HIGH (ref 30–200)
CO2 SERPL-SCNC: 17 MMOL/L — LOW (ref 22–31)
CO2 SERPL-SCNC: 18 MMOL/L — LOW (ref 22–31)
CO2 SERPL-SCNC: 21 MMOL/L — LOW (ref 22–31)
CO2 SERPL-SCNC: 38 MMOL/L — HIGH (ref 22–31)
COLOR SPEC: YELLOW — SIGNIFICANT CHANGE UP
CREAT SERPL-MCNC: 1.62 MG/DL — HIGH (ref 0.5–1.3)
CREAT SERPL-MCNC: 1.93 MG/DL — HIGH (ref 0.5–1.3)
CREAT SERPL-MCNC: 1.95 MG/DL — HIGH (ref 0.5–1.3)
CREAT SERPL-MCNC: 2.38 MG/DL — HIGH (ref 0.5–1.3)
CREAT SERPL-MCNC: 2.53 MG/DL — HIGH (ref 0.5–1.3)
DIFF PNL FLD: ABNORMAL
EGFR: 28 ML/MIN/1.73M2 — LOW
EGFR: 30 ML/MIN/1.73M2 — LOW
EGFR: 38 ML/MIN/1.73M2 — LOW
EGFR: 39 ML/MIN/1.73M2 — LOW
EGFR: 48 ML/MIN/1.73M2 — LOW
EOSINOPHIL # BLD AUTO: 0 K/UL — SIGNIFICANT CHANGE UP (ref 0–0.5)
EOSINOPHIL NFR BLD AUTO: 0 % — SIGNIFICANT CHANGE UP (ref 0–6)
EPI CELLS # UR: 2 /HPF — SIGNIFICANT CHANGE UP
ESTIMATED AVERAGE GLUCOSE: 163 MG/DL — HIGH (ref 68–114)
FIBRINOGEN PPP-MCNC: 418 MG/DL — SIGNIFICANT CHANGE UP (ref 200–445)
GAS PNL BLDA: SIGNIFICANT CHANGE UP
GLUCOSE BLDC GLUCOMTR-MCNC: 170 MG/DL — HIGH (ref 70–99)
GLUCOSE BLDC GLUCOMTR-MCNC: 275 MG/DL — HIGH (ref 70–99)
GLUCOSE SERPL-MCNC: 195 MG/DL — HIGH (ref 70–99)
GLUCOSE SERPL-MCNC: 200 MG/DL — HIGH (ref 70–99)
GLUCOSE SERPL-MCNC: 218 MG/DL — HIGH (ref 70–99)
GLUCOSE SERPL-MCNC: 232 MG/DL — HIGH (ref 70–99)
GLUCOSE SERPL-MCNC: 280 MG/DL — HIGH (ref 70–99)
GLUCOSE UR QL: NEGATIVE — SIGNIFICANT CHANGE UP
HCT VFR BLD CALC: 29.6 % — LOW (ref 39–50)
HCT VFR BLD CALC: 32.4 % — LOW (ref 39–50)
HCT VFR BLD CALC: 36 % — LOW (ref 39–50)
HCT VFR BLD CALC: 36.9 % — LOW (ref 39–50)
HCT VFR BLD CALC: 38.6 % — LOW (ref 39–50)
HGB BLD-MCNC: 10.6 G/DL — LOW (ref 13–17)
HGB BLD-MCNC: 12.2 G/DL — LOW (ref 13–17)
HGB BLD-MCNC: 12.3 G/DL — LOW (ref 13–17)
HGB BLD-MCNC: 12.7 G/DL — LOW (ref 13–17)
HGB BLD-MCNC: 9.9 G/DL — LOW (ref 13–17)
HYALINE CASTS # UR AUTO: 4 /LPF — HIGH (ref 0–2)
INR BLD: 1.54 RATIO — HIGH (ref 0.88–1.16)
INR BLD: 1.56 RATIO — HIGH (ref 0.88–1.16)
KETONES UR-MCNC: SIGNIFICANT CHANGE UP
LEUKOCYTE ESTERASE UR-ACNC: NEGATIVE — SIGNIFICANT CHANGE UP
LYMPHOCYTES # BLD AUTO: 1.43 K/UL — SIGNIFICANT CHANGE UP (ref 1–3.3)
LYMPHOCYTES # BLD AUTO: 7 % — LOW (ref 13–44)
MAGNESIUM SERPL-MCNC: 1.6 MG/DL — SIGNIFICANT CHANGE UP (ref 1.6–2.6)
MAGNESIUM SERPL-MCNC: 2 MG/DL — SIGNIFICANT CHANGE UP (ref 1.6–2.6)
MAGNESIUM SERPL-MCNC: 2.2 MG/DL — SIGNIFICANT CHANGE UP (ref 1.6–2.6)
MAGNESIUM SERPL-MCNC: 3 MG/DL — HIGH (ref 1.6–2.6)
MAGNESIUM SERPL-MCNC: 3.2 MG/DL — HIGH (ref 1.6–2.6)
MANUAL SMEAR VERIFICATION: SIGNIFICANT CHANGE UP
MCHC RBC-ENTMCNC: 28.7 PG — SIGNIFICANT CHANGE UP (ref 27–34)
MCHC RBC-ENTMCNC: 28.9 PG — SIGNIFICANT CHANGE UP (ref 27–34)
MCHC RBC-ENTMCNC: 29.1 PG — SIGNIFICANT CHANGE UP (ref 27–34)
MCHC RBC-ENTMCNC: 30.1 PG — SIGNIFICANT CHANGE UP (ref 27–34)
MCHC RBC-ENTMCNC: 32.7 GM/DL — SIGNIFICANT CHANGE UP (ref 32–36)
MCHC RBC-ENTMCNC: 32.9 GM/DL — SIGNIFICANT CHANGE UP (ref 32–36)
MCHC RBC-ENTMCNC: 33.3 GM/DL — SIGNIFICANT CHANGE UP (ref 32–36)
MCHC RBC-ENTMCNC: 33.4 GM/DL — SIGNIFICANT CHANGE UP (ref 32–36)
MCHC RBC-ENTMCNC: 33.9 GM/DL — SIGNIFICANT CHANGE UP (ref 32–36)
MCV RBC AUTO: 86.6 FL — SIGNIFICANT CHANGE UP (ref 80–100)
MCV RBC AUTO: 87.1 FL — SIGNIFICANT CHANGE UP (ref 80–100)
MCV RBC AUTO: 87.3 FL — SIGNIFICANT CHANGE UP (ref 80–100)
MCV RBC AUTO: 88.9 FL — SIGNIFICANT CHANGE UP (ref 80–100)
MCV RBC AUTO: 89 FL — SIGNIFICANT CHANGE UP (ref 80–100)
MONOCYTES # BLD AUTO: 0.41 K/UL — SIGNIFICANT CHANGE UP (ref 0–0.9)
MONOCYTES NFR BLD AUTO: 2 % — SIGNIFICANT CHANGE UP (ref 2–14)
NEUTROPHILS # BLD AUTO: 18.55 K/UL — HIGH (ref 1.8–7.4)
NEUTROPHILS NFR BLD AUTO: 88 % — HIGH (ref 43–77)
NEUTS BAND # BLD: 3 % — SIGNIFICANT CHANGE UP (ref 0–8)
NITRITE UR-MCNC: NEGATIVE — SIGNIFICANT CHANGE UP
NRBC # BLD: 0 /100 WBCS — SIGNIFICANT CHANGE UP (ref 0–0)
NRBC # BLD: 0 /100 — SIGNIFICANT CHANGE UP (ref 0–0)
PH UR: 6 — SIGNIFICANT CHANGE UP (ref 5–8)
PHOSPHATE SERPL-MCNC: 10.5 MG/DL — HIGH (ref 2.5–4.5)
PHOSPHATE SERPL-MCNC: 3.7 MG/DL — SIGNIFICANT CHANGE UP (ref 2.5–4.5)
PHOSPHATE SERPL-MCNC: 3.8 MG/DL — SIGNIFICANT CHANGE UP (ref 2.5–4.5)
PHOSPHATE SERPL-MCNC: 7.9 MG/DL — HIGH (ref 2.5–4.5)
PLAT MORPH BLD: NORMAL — SIGNIFICANT CHANGE UP
PLATELET # BLD AUTO: 141 K/UL — LOW (ref 150–400)
PLATELET # BLD AUTO: 158 K/UL — SIGNIFICANT CHANGE UP (ref 150–400)
PLATELET # BLD AUTO: 161 K/UL — SIGNIFICANT CHANGE UP (ref 150–400)
PLATELET # BLD AUTO: 186 K/UL — SIGNIFICANT CHANGE UP (ref 150–400)
PLATELET # BLD AUTO: 195 K/UL — SIGNIFICANT CHANGE UP (ref 150–400)
POTASSIUM SERPL-MCNC: 3.8 MMOL/L — SIGNIFICANT CHANGE UP (ref 3.5–5.3)
POTASSIUM SERPL-MCNC: 4 MMOL/L — SIGNIFICANT CHANGE UP (ref 3.5–5.3)
POTASSIUM SERPL-MCNC: 4.3 MMOL/L — SIGNIFICANT CHANGE UP (ref 3.5–5.3)
POTASSIUM SERPL-MCNC: 4.4 MMOL/L — SIGNIFICANT CHANGE UP (ref 3.5–5.3)
POTASSIUM SERPL-SCNC: 3.8 MMOL/L — SIGNIFICANT CHANGE UP (ref 3.5–5.3)
POTASSIUM SERPL-SCNC: 4 MMOL/L — SIGNIFICANT CHANGE UP (ref 3.5–5.3)
POTASSIUM SERPL-SCNC: 4.3 MMOL/L — SIGNIFICANT CHANGE UP (ref 3.5–5.3)
POTASSIUM SERPL-SCNC: 4.4 MMOL/L — SIGNIFICANT CHANGE UP (ref 3.5–5.3)
PROT SERPL-MCNC: 4.2 G/DL — LOW (ref 6–8.3)
PROT SERPL-MCNC: 4.7 G/DL — LOW (ref 6–8.3)
PROT SERPL-MCNC: 4.9 G/DL — LOW (ref 6–8.3)
PROT SERPL-MCNC: 6.4 G/DL — SIGNIFICANT CHANGE UP (ref 6–8.3)
PROT SERPL-MCNC: 6.6 G/DL — SIGNIFICANT CHANGE UP (ref 6–8.3)
PROT UR-MCNC: ABNORMAL
PROTHROM AB SERPL-ACNC: 17.9 SEC — HIGH (ref 10.5–13.4)
PROTHROM AB SERPL-ACNC: 18.2 SEC — HIGH (ref 10.5–13.4)
RAPID RVP RESULT: SIGNIFICANT CHANGE UP
RBC # BLD: 3.4 M/UL — LOW (ref 4.2–5.8)
RBC # BLD: 3.64 M/UL — LOW (ref 4.2–5.8)
RBC # BLD: 4.05 M/UL — LOW (ref 4.2–5.8)
RBC # BLD: 4.26 M/UL — SIGNIFICANT CHANGE UP (ref 4.2–5.8)
RBC # BLD: 4.42 M/UL — SIGNIFICANT CHANGE UP (ref 4.2–5.8)
RBC # FLD: 14.8 % — HIGH (ref 10.3–14.5)
RBC # FLD: 14.9 % — HIGH (ref 10.3–14.5)
RBC # FLD: 15.1 % — HIGH (ref 10.3–14.5)
RBC # FLD: 15.2 % — HIGH (ref 10.3–14.5)
RBC # FLD: 15.3 % — HIGH (ref 10.3–14.5)
RBC BLD AUTO: SIGNIFICANT CHANGE UP
RBC CASTS # UR COMP ASSIST: 7 /HPF — HIGH (ref 0–4)
SARS-COV-2 RNA SPEC QL NAA+PROBE: SIGNIFICANT CHANGE UP
SMUDGE CELLS # BLD: PRESENT — SIGNIFICANT CHANGE UP
SODIUM SERPL-SCNC: 134 MMOL/L — LOW (ref 135–145)
SODIUM SERPL-SCNC: 135 MMOL/L — SIGNIFICANT CHANGE UP (ref 135–145)
SODIUM SERPL-SCNC: 145 MMOL/L — SIGNIFICANT CHANGE UP (ref 135–145)
SODIUM SERPL-SCNC: 152 MMOL/L — HIGH (ref 135–145)
SP GR SPEC: 1.03 — HIGH (ref 1.01–1.02)
TROPONIN T, HIGH SENSITIVITY RESULT: 2445 NG/L — HIGH (ref 0–51)
TROPONIN T, HIGH SENSITIVITY RESULT: 3425 NG/L — HIGH (ref 0–51)
TROPONIN T, HIGH SENSITIVITY RESULT: 3464 NG/L — HIGH (ref 0–51)
TROPONIN T, HIGH SENSITIVITY RESULT: HIGH NG/L (ref 0–51)
UROBILINOGEN FLD QL: NEGATIVE — SIGNIFICANT CHANGE UP
WBC # BLD: 16.88 K/UL — HIGH (ref 3.8–10.5)
WBC # BLD: 18.55 K/UL — HIGH (ref 3.8–10.5)
WBC # BLD: 18.88 K/UL — HIGH (ref 3.8–10.5)
WBC # BLD: 19.42 K/UL — HIGH (ref 3.8–10.5)
WBC # BLD: 20.38 K/UL — HIGH (ref 3.8–10.5)
WBC # FLD AUTO: 16.88 K/UL — HIGH (ref 3.8–10.5)
WBC # FLD AUTO: 18.55 K/UL — HIGH (ref 3.8–10.5)
WBC # FLD AUTO: 18.88 K/UL — HIGH (ref 3.8–10.5)
WBC # FLD AUTO: 19.42 K/UL — HIGH (ref 3.8–10.5)
WBC # FLD AUTO: 20.38 K/UL — HIGH (ref 3.8–10.5)
WBC UR QL: 7 /HPF — HIGH (ref 0–5)

## 2022-11-25 PROCEDURE — 93306 TTE W/DOPPLER COMPLETE: CPT | Mod: 26

## 2022-11-25 PROCEDURE — 33952 ECMO/ECLS INSJ PRPH CANNULA: CPT

## 2022-11-25 PROCEDURE — 71045 X-RAY EXAM CHEST 1 VIEW: CPT | Mod: 26

## 2022-11-25 PROCEDURE — 33947 ECMO/ECLS INITIATION ARTERY: CPT

## 2022-11-25 PROCEDURE — 99223 1ST HOSP IP/OBS HIGH 75: CPT

## 2022-11-25 PROCEDURE — 36556 INSERT NON-TUNNEL CV CATH: CPT

## 2022-11-25 PROCEDURE — 71045 X-RAY EXAM CHEST 1 VIEW: CPT | Mod: 26,77

## 2022-11-25 PROCEDURE — 70450 CT HEAD/BRAIN W/O DYE: CPT | Mod: 26

## 2022-11-25 PROCEDURE — 93010 ELECTROCARDIOGRAM REPORT: CPT | Mod: 77

## 2022-11-25 PROCEDURE — 36620 INSERTION CATHETER ARTERY: CPT

## 2022-11-25 PROCEDURE — 74177 CT ABD & PELVIS W/CONTRAST: CPT | Mod: 26

## 2022-11-25 PROCEDURE — 71260 CT THORAX DX C+: CPT | Mod: 26

## 2022-11-25 PROCEDURE — 70450 CT HEAD/BRAIN W/O DYE: CPT | Mod: 26,77

## 2022-11-25 PROCEDURE — 93010 ELECTROCARDIOGRAM REPORT: CPT

## 2022-11-25 PROCEDURE — 99291 CRITICAL CARE FIRST HOUR: CPT | Mod: 25

## 2022-11-25 PROCEDURE — 99233 SBSQ HOSP IP/OBS HIGH 50: CPT

## 2022-11-25 DEVICE — CANNULA FEMORAL ARTERIAL JUGULAR BIO-MEDICUS 19FR X 3/8" VENTED: Type: IMPLANTABLE DEVICE | Status: FUNCTIONAL

## 2022-11-25 DEVICE — CANNULA VENOUS HLS 23FR X 3/8": Type: IMPLANTABLE DEVICE | Status: FUNCTIONAL

## 2022-11-25 DEVICE — CENTRIMAG BLOOD PUMP: Type: IMPLANTABLE DEVICE | Status: FUNCTIONAL

## 2022-11-25 RX ORDER — HEPARIN SODIUM 5000 [USP'U]/ML
INJECTION INTRAVENOUS; SUBCUTANEOUS
Qty: 25000 | Refills: 0 | Status: DISCONTINUED | OUTPATIENT
Start: 2022-11-25 | End: 2022-11-25

## 2022-11-25 RX ORDER — INSULIN LISPRO 100/ML
VIAL (ML) SUBCUTANEOUS
Refills: 0 | Status: DISCONTINUED | OUTPATIENT
Start: 2022-11-25 | End: 2022-11-25

## 2022-11-25 RX ORDER — EPINEPHRINE 0.3 MG/.3ML
0.1 INJECTION INTRAMUSCULAR; SUBCUTANEOUS
Qty: 4 | Refills: 0 | Status: DISCONTINUED | OUTPATIENT
Start: 2022-11-25 | End: 2022-11-26

## 2022-11-25 RX ORDER — INSULIN LISPRO 100/ML
VIAL (ML) SUBCUTANEOUS AT BEDTIME
Refills: 0 | Status: DISCONTINUED | OUTPATIENT
Start: 2022-11-25 | End: 2022-11-25

## 2022-11-25 RX ORDER — HEPARIN SODIUM 5000 [USP'U]/ML
1200 INJECTION INTRAVENOUS; SUBCUTANEOUS
Qty: 25000 | Refills: 0 | Status: DISCONTINUED | OUTPATIENT
Start: 2022-11-25 | End: 2022-11-25

## 2022-11-25 RX ORDER — SODIUM BICARBONATE 1 MEQ/ML
50 SYRINGE (ML) INTRAVENOUS ONCE
Refills: 0 | Status: COMPLETED | OUTPATIENT
Start: 2022-11-25 | End: 2022-11-25

## 2022-11-25 RX ORDER — NOREPINEPHRINE BITARTRATE/D5W 8 MG/250ML
0.05 PLASTIC BAG, INJECTION (ML) INTRAVENOUS
Qty: 8 | Refills: 0 | Status: DISCONTINUED | OUTPATIENT
Start: 2022-11-25 | End: 2022-11-26

## 2022-11-25 RX ORDER — VANCOMYCIN HCL 1 G
1000 VIAL (EA) INTRAVENOUS ONCE
Refills: 0 | Status: COMPLETED | OUTPATIENT
Start: 2022-11-25 | End: 2022-11-25

## 2022-11-25 RX ORDER — ERTAPENEM SODIUM 1 G/1
1000 INJECTION, POWDER, LYOPHILIZED, FOR SOLUTION INTRAMUSCULAR; INTRAVENOUS EVERY 24 HOURS
Refills: 0 | Status: DISCONTINUED | OUTPATIENT
Start: 2022-11-25 | End: 2022-11-25

## 2022-11-25 RX ORDER — CALCIUM GLUCONATE 100 MG/ML
2 VIAL (ML) INTRAVENOUS ONCE
Refills: 0 | Status: COMPLETED | OUTPATIENT
Start: 2022-11-25 | End: 2022-11-25

## 2022-11-25 RX ORDER — EPINEPHRINE 0.3 MG/.3ML
0.02 INJECTION INTRAMUSCULAR; SUBCUTANEOUS ONCE
Refills: 0 | Status: COMPLETED | OUTPATIENT
Start: 2022-11-25 | End: 2022-11-25

## 2022-11-25 RX ORDER — DEXTROSE 50 % IN WATER 50 %
50 SYRINGE (ML) INTRAVENOUS
Refills: 0 | Status: DISCONTINUED | OUTPATIENT
Start: 2022-11-25 | End: 2022-12-08

## 2022-11-25 RX ORDER — CHLORHEXIDINE GLUCONATE 213 G/1000ML
1 SOLUTION TOPICAL
Refills: 0 | Status: DISCONTINUED | OUTPATIENT
Start: 2022-11-25 | End: 2022-11-29

## 2022-11-25 RX ORDER — VASOPRESSIN 20 [USP'U]/ML
0.1 INJECTION INTRAVENOUS
Qty: 40 | Refills: 0 | Status: DISCONTINUED | OUTPATIENT
Start: 2022-11-25 | End: 2022-11-28

## 2022-11-25 RX ORDER — ALBUMIN HUMAN 25 %
50 VIAL (ML) INTRAVENOUS
Refills: 0 | Status: COMPLETED | OUTPATIENT
Start: 2022-11-25 | End: 2022-11-25

## 2022-11-25 RX ORDER — PROPOFOL 10 MG/ML
10 INJECTION, EMULSION INTRAVENOUS
Qty: 1000 | Refills: 0 | Status: DISCONTINUED | OUTPATIENT
Start: 2022-11-25 | End: 2022-11-28

## 2022-11-25 RX ORDER — AMIODARONE HYDROCHLORIDE 400 MG/1
0.25 TABLET ORAL
Qty: 900 | Refills: 0 | Status: DISCONTINUED | OUTPATIENT
Start: 2022-11-25 | End: 2022-11-26

## 2022-11-25 RX ORDER — SODIUM BICARBONATE 1 MEQ/ML
50 SYRINGE (ML) INTRAVENOUS
Refills: 0 | Status: COMPLETED | OUTPATIENT
Start: 2022-11-25 | End: 2022-11-25

## 2022-11-25 RX ORDER — HYDROCORTISONE 20 MG
100 TABLET ORAL EVERY 8 HOURS
Refills: 0 | Status: DISCONTINUED | OUTPATIENT
Start: 2022-11-25 | End: 2022-11-26

## 2022-11-25 RX ORDER — EPINEPHRINE 0.3 MG/.3ML
0.04 INJECTION INTRAMUSCULAR; SUBCUTANEOUS ONCE
Refills: 0 | Status: COMPLETED | OUTPATIENT
Start: 2022-11-25 | End: 2022-11-25

## 2022-11-25 RX ORDER — PIPERACILLIN AND TAZOBACTAM 4; .5 G/20ML; G/20ML
3.38 INJECTION, POWDER, LYOPHILIZED, FOR SOLUTION INTRAVENOUS ONCE
Refills: 0 | Status: COMPLETED | OUTPATIENT
Start: 2022-11-25 | End: 2022-11-25

## 2022-11-25 RX ORDER — PHENYLEPHRINE HYDROCHLORIDE 10 MG/ML
0.5 INJECTION INTRAVENOUS
Qty: 40 | Refills: 0 | Status: DISCONTINUED | OUTPATIENT
Start: 2022-11-25 | End: 2022-11-25

## 2022-11-25 RX ORDER — HEPARIN SODIUM 5000 [USP'U]/ML
500 INJECTION INTRAVENOUS; SUBCUTANEOUS
Qty: 25000 | Refills: 0 | Status: DISCONTINUED | OUTPATIENT
Start: 2022-11-25 | End: 2022-11-29

## 2022-11-25 RX ORDER — VANCOMYCIN HCL 500 MG
5 VIAL (EA) INTRAVENOUS ONCE
Refills: 0 | Status: COMPLETED | OUTPATIENT
Start: 2022-11-25 | End: 2022-11-25

## 2022-11-25 RX ORDER — SODIUM CHLORIDE 9 MG/ML
10 INJECTION INTRAMUSCULAR; INTRAVENOUS; SUBCUTANEOUS
Refills: 0 | Status: DISCONTINUED | OUTPATIENT
Start: 2022-11-25 | End: 2022-12-08

## 2022-11-25 RX ORDER — SODIUM CHLORIDE 9 MG/ML
1000 INJECTION INTRAMUSCULAR; INTRAVENOUS; SUBCUTANEOUS
Refills: 0 | Status: DISCONTINUED | OUTPATIENT
Start: 2022-11-25 | End: 2022-12-08

## 2022-11-25 RX ORDER — HYDROCORTISONE 20 MG
100 TABLET ORAL ONCE
Refills: 0 | Status: COMPLETED | OUTPATIENT
Start: 2022-11-25 | End: 2022-11-25

## 2022-11-25 RX ORDER — ALBUMIN HUMAN 25 %
100 VIAL (ML) INTRAVENOUS ONCE
Refills: 0 | Status: COMPLETED | OUTPATIENT
Start: 2022-11-25 | End: 2022-11-25

## 2022-11-25 RX ORDER — PIPERACILLIN AND TAZOBACTAM 4; .5 G/20ML; G/20ML
3.38 INJECTION, POWDER, LYOPHILIZED, FOR SOLUTION INTRAVENOUS ONCE
Refills: 0 | Status: DISCONTINUED | OUTPATIENT
Start: 2022-11-25 | End: 2022-11-25

## 2022-11-25 RX ORDER — FENTANYL CITRATE 50 UG/ML
100 INJECTION INTRAVENOUS ONCE
Refills: 0 | Status: DISCONTINUED | OUTPATIENT
Start: 2022-11-25 | End: 2022-11-25

## 2022-11-25 RX ORDER — METHYLENE BLUE 65 MG
200 TABLET ORAL ONCE
Refills: 0 | Status: COMPLETED | OUTPATIENT
Start: 2022-11-25 | End: 2022-11-25

## 2022-11-25 RX ORDER — POTASSIUM CHLORIDE 20 MEQ
20 PACKET (EA) ORAL ONCE
Refills: 0 | Status: COMPLETED | OUTPATIENT
Start: 2022-11-25 | End: 2022-11-25

## 2022-11-25 RX ORDER — DEXMEDETOMIDINE HYDROCHLORIDE IN 0.9% SODIUM CHLORIDE 4 UG/ML
0.5 INJECTION INTRAVENOUS
Qty: 200 | Refills: 0 | Status: DISCONTINUED | OUTPATIENT
Start: 2022-11-25 | End: 2022-11-25

## 2022-11-25 RX ORDER — INSULIN HUMAN 100 [IU]/ML
6 INJECTION, SOLUTION SUBCUTANEOUS
Qty: 100 | Refills: 0 | Status: DISCONTINUED | OUTPATIENT
Start: 2022-11-25 | End: 2022-12-08

## 2022-11-25 RX ORDER — CHLORHEXIDINE GLUCONATE 213 G/1000ML
15 SOLUTION TOPICAL EVERY 12 HOURS
Refills: 0 | Status: DISCONTINUED | OUTPATIENT
Start: 2022-11-25 | End: 2022-12-08

## 2022-11-25 RX ORDER — MEROPENEM 1 G/30ML
1000 INJECTION INTRAVENOUS EVERY 12 HOURS
Refills: 0 | Status: DISCONTINUED | OUTPATIENT
Start: 2022-11-25 | End: 2022-11-28

## 2022-11-25 RX ORDER — INSULIN LISPRO 100/ML
VIAL (ML) SUBCUTANEOUS EVERY 6 HOURS
Refills: 0 | Status: DISCONTINUED | OUTPATIENT
Start: 2022-11-25 | End: 2022-11-29

## 2022-11-25 RX ORDER — MAGNESIUM SULFATE 500 MG/ML
2 VIAL (ML) INJECTION ONCE
Refills: 0 | Status: COMPLETED | OUTPATIENT
Start: 2022-11-25 | End: 2022-11-25

## 2022-11-25 RX ORDER — CHLORHEXIDINE GLUCONATE 213 G/1000ML
15 SOLUTION TOPICAL EVERY 12 HOURS
Refills: 0 | Status: DISCONTINUED | OUTPATIENT
Start: 2022-11-25 | End: 2022-11-25

## 2022-11-25 RX ORDER — NOREPINEPHRINE BITARTRATE/D5W 8 MG/250ML
0.05 PLASTIC BAG, INJECTION (ML) INTRAVENOUS
Qty: 8 | Refills: 0 | Status: DISCONTINUED | OUTPATIENT
Start: 2022-11-25 | End: 2022-11-27

## 2022-11-25 RX ORDER — LIDOCAINE HCL 20 MG/ML
10 VIAL (ML) INJECTION ONCE
Refills: 0 | Status: DISCONTINUED | OUTPATIENT
Start: 2022-11-25 | End: 2022-11-25

## 2022-11-25 RX ADMIN — HEPARIN SODIUM 12 UNIT(S)/HR: 5000 INJECTION INTRAVENOUS; SUBCUTANEOUS at 13:04

## 2022-11-25 RX ADMIN — Medication 4: at 12:42

## 2022-11-25 RX ADMIN — Medication 50 MILLILITER(S): at 22:00

## 2022-11-25 RX ADMIN — Medication 90 MILLIGRAM(S): at 18:45

## 2022-11-25 RX ADMIN — Medication 50 MILLIEQUIVALENT(S): at 22:10

## 2022-11-25 RX ADMIN — Medication 8.71 MICROGRAM(S)/KG/MIN: at 13:04

## 2022-11-25 RX ADMIN — CHLORHEXIDINE GLUCONATE 15 MILLILITER(S): 213 SOLUTION TOPICAL at 22:50

## 2022-11-25 RX ADMIN — INSULIN HUMAN 6 UNIT(S)/HR: 100 INJECTION, SOLUTION SUBCUTANEOUS at 21:04

## 2022-11-25 RX ADMIN — Medication 50 MILLILITER(S): at 11:59

## 2022-11-25 RX ADMIN — Medication 1000 MILLIGRAM(S): at 10:30

## 2022-11-25 RX ADMIN — Medication 50 MILLILITER(S): at 22:15

## 2022-11-25 RX ADMIN — EPINEPHRINE 0.02 MILLIGRAM(S): 0.3 INJECTION INTRAMUSCULAR; SUBCUTANEOUS at 18:22

## 2022-11-25 RX ADMIN — FENTANYL CITRATE 100 MICROGRAM(S): 50 INJECTION INTRAVENOUS at 15:40

## 2022-11-25 RX ADMIN — CARVEDILOL PHOSPHATE 6.25 MILLIGRAM(S): 80 CAPSULE, EXTENDED RELEASE ORAL at 06:17

## 2022-11-25 RX ADMIN — Medication 50 MILLIEQUIVALENT(S): at 22:20

## 2022-11-25 RX ADMIN — Medication 50 MILLILITER(S): at 22:30

## 2022-11-25 RX ADMIN — SODIUM CHLORIDE 150 MILLILITER(S): 9 INJECTION, SOLUTION INTRAVENOUS at 00:32

## 2022-11-25 RX ADMIN — Medication 50 MILLIEQUIVALENT(S): at 18:40

## 2022-11-25 RX ADMIN — SODIUM CHLORIDE 150 MILLILITER(S): 9 INJECTION, SOLUTION INTRAVENOUS at 00:30

## 2022-11-25 RX ADMIN — Medication 50 MILLILITER(S): at 22:50

## 2022-11-25 RX ADMIN — PIPERACILLIN AND TAZOBACTAM 200 GRAM(S): 4; .5 INJECTION, POWDER, LYOPHILIZED, FOR SOLUTION INTRAVENOUS at 13:26

## 2022-11-25 RX ADMIN — Medication 400 MILLIGRAM(S): at 03:14

## 2022-11-25 RX ADMIN — Medication 25 GRAM(S): at 06:16

## 2022-11-25 RX ADMIN — MEROPENEM 100 MILLIGRAM(S): 1 INJECTION INTRAVENOUS at 22:50

## 2022-11-25 RX ADMIN — PHENYLEPHRINE HYDROCHLORIDE 17.4 MICROGRAM(S)/KG/MIN: 10 INJECTION INTRAVENOUS at 11:59

## 2022-11-25 RX ADMIN — CHLORHEXIDINE GLUCONATE 1 APPLICATION(S): 213 SOLUTION TOPICAL at 09:28

## 2022-11-25 RX ADMIN — Medication 2: at 09:50

## 2022-11-25 RX ADMIN — Medication 50 MILLIEQUIVALENT(S): at 20:51

## 2022-11-25 RX ADMIN — Medication 50 MILLIEQUIVALENT(S): at 20:45

## 2022-11-25 RX ADMIN — PROPOFOL 5.57 MICROGRAM(S)/KG/MIN: 10 INJECTION, EMULSION INTRAVENOUS at 21:05

## 2022-11-25 RX ADMIN — EPINEPHRINE 34.8 MICROGRAM(S)/KG/MIN: 0.3 INJECTION INTRAMUSCULAR; SUBCUTANEOUS at 21:05

## 2022-11-25 RX ADMIN — Medication 250 MILLIGRAM(S): at 23:26

## 2022-11-25 RX ADMIN — Medication 50 MILLILITER(S): at 20:00

## 2022-11-25 RX ADMIN — Medication 8.71 MICROGRAM(S)/KG/MIN: at 21:04

## 2022-11-25 RX ADMIN — Medication 100 MILLIGRAM(S): at 18:48

## 2022-11-25 RX ADMIN — Medication 50 MILLIEQUIVALENT(S): at 18:45

## 2022-11-25 RX ADMIN — EPINEPHRINE 0.04 MILLIGRAM(S): 0.3 INJECTION INTRAMUSCULAR; SUBCUTANEOUS at 18:41

## 2022-11-25 RX ADMIN — Medication 800 GRAM(S): at 19:15

## 2022-11-25 RX ADMIN — SODIUM CHLORIDE 100 MILLILITER(S): 9 INJECTION, SOLUTION INTRAVENOUS at 09:29

## 2022-11-25 RX ADMIN — VASOPRESSIN 15 UNIT(S)/MIN: 20 INJECTION INTRAVENOUS at 21:05

## 2022-11-25 RX ADMIN — CHLORHEXIDINE GLUCONATE 1 APPLICATION(S): 213 SOLUTION TOPICAL at 22:50

## 2022-11-25 RX ADMIN — HEPARIN SODIUM 1000 UNIT(S)/HR: 5000 INJECTION INTRAVENOUS; SUBCUTANEOUS at 05:56

## 2022-11-25 RX ADMIN — PANTOPRAZOLE SODIUM 40 MILLIGRAM(S): 20 TABLET, DELAYED RELEASE ORAL at 06:05

## 2022-11-25 RX ADMIN — Medication 20 MILLIEQUIVALENT(S): at 06:15

## 2022-11-25 RX ADMIN — HEPARIN SODIUM 1000 UNIT(S)/HR: 5000 INJECTION INTRAVENOUS; SUBCUTANEOUS at 08:16

## 2022-11-25 RX ADMIN — Medication 400 MILLIGRAM(S): at 09:29

## 2022-11-25 RX ADMIN — Medication 50 MILLILITER(S): at 19:51

## 2022-11-25 RX ADMIN — Medication 200 GRAM(S): at 09:28

## 2022-11-25 RX ADMIN — SODIUM CHLORIDE 150 MILLILITER(S): 9 INJECTION, SOLUTION INTRAVENOUS at 08:16

## 2022-11-25 NOTE — CONSULT NOTE ADULT - PROBLEM SELECTOR RECOMMENDATION 9
- demand ischemia vs. ACS  - Pt currently denies CP  - EKG in ED showed sinus tach with LVH, STD I, II, V4-V6.  - hs Troponin uptrending ; not yet peaked ; c/t trend  - TTE 11/25: EF 43%, Moderately dilated left atrium, Moderate segmental left ventricular systolic dysfunction. Akinesis of the mid-basal anterolateral wall, inferior and inferolateral wall with all remaining walls hypokinetic, Moderate diastolic dysfunction (Stage II).  - per Cardiology ;  continue Aspirin 81mg and Plavix, in addition to Heparin drip   - c/t monitor on telemetry  - Please notify Cardiology if patient develops any chest pain, dynamic EKG changes Pt just came from [facility]. He has no PCP or pharmacy here.   Home meds obtained from daughter Caridad    Ecosprin 75mg po bid  Carvista ( carvedilol) 6.25mg po bid  Ramoril ( Ramipril) 2.5mg po qbedtime  Natrilix XR ( Indapamide) 1.5mg po daily  Rivarox (rivaroxaban) 2.5mg po q bedtime  Rosutin (rosuvastatin) 10mg po qbedtime  Sergel ( omeprazole) 20mg po bid  Mucomyst 600mg po bid  Insulin Maxilin 30-70 18u qam, 16u qpm  Metformin 1000mg po daily Pt just came from [facility]. He has no PCP or pharmacy here.   Home meds obtained from daughter Caridad    Ecosprin 75mg po bid  Carvista ( carvedilol) 6.25mg po bid  Ramoril ( Ramipril) 2.5mg po qbedtime  Natrilix XR ( Indapamide) 1.5mg po daily  Rivarox (rivaroxaban) 2.5mg po q bedtime  Rosutin (rosuvastatin) 10mg po qbedtime  Sergel ( omeprazole) 20mg po bid  Mucomyst 600mg po bid  Insulin Maxilin 30-70 18u qam, 16u qpm  Metformin 1000mg po daily Pt just came from [facility]. He has no PCP or pharmacy here.   Home meds obtained from daughter Caridad    Ecosprin 75mg po bid  Carvista ( carvedilol) 6.25mg po bid  Ramoril ( Ramipril) 2.5mg po qbedtime  Natrilix XR ( Indapamide) 1.5mg po daily  Rivarox (rivaroxaban) 2.5mg po q bedtime  Rosutin (rosuvastatin) 10mg po qbedtime  Sergel ( omeprazole) 20mg po bid  Mucomyst 600mg po bid  Insulin Maxilin 30-70 18u qam, 16u qpm  Metformin 1000mg po daily

## 2022-11-25 NOTE — CONSULT NOTE ADULT - SUBJECTIVE AND OBJECTIVE BOX
TRAUMA/ACUTE CARE SURGERY - HOSPITAL MEDICINE CO-MANAGEMENT INITIAL VISIT NOTE    CHIEF COMPLAINT: Patient is a 62y old  Male who presents with a chief complaint of Acute cholecystitis, gallstone pancreatitis (2022 08:36)      HPI: Patient is a 63 y/o male with PMH of CABG, DM, HTN, HLD presenting as transfer from [place] for c/f STEMI. PTA arrival received 325 aspirin, 600 plavix, and no heparin drip started. Around 1:30 am patient had multiple episodes of non-bloody diarrhea and emesis with associated abdominal pain and chest pain, unable to localize, non-radiating, with associated SOB and no associated palpitations or diaphoresis. No recent fevers/chills, cough, rashes, or changes in urination. Does report mild diffuse back pain; started 5 days ago in setting on injury while walking and lifting objects. Denies focal weakness, numbness/tingling, or LOC due does report mild dizziness. Imaging concerning for acute cholecystitis, gallstone pancreatitis.  Overnight pt noted to be sating ; now requiring BIPAP. Also with uptrending hs trops to 2445. Seen by Cardiology and started on heparin gtt. Also spiked fever 101.7F this AM ; blood cultures sent.  Pt currently denies CP, SOB. He c/o diffuse abd pain worse in the left lower quadrant.          Allergies    No Known Allergies    Intolerances        HOME MEDICATIONS: [ ] Reviewed  Home Medications:  aspirin 81 mg oral tablet: 1 tab(s) orally once a day (2022 15:57)  carvedilol 6.25 mg oral tablet: 1 tab(s) orally 2 times a day (2022 15:59)  omeprazole 20 mg oral delayed release capsule: 1 cap(s) orally once a day (2022 16:02)  ramipril 2.5 mg oral tablet: 1 tab(s) orally once a day (2022 16:00)  rivaroxaban 2.5 mg oral tablet: 1 tab(s) orally once a day (2022 16:00)  rosuvastatin 10 mg oral tablet: 1 tab(s) orally once a day (2022 16:01)      MEDICATIONS  (STANDING):  acetaminophen   IVPB .. 1000 milliGRAM(s) IV Intermittent every 6 hours  albumin human 25% IVPB 100 milliLiter(s) IV Intermittent once  aspirin  chewable 81 milliGRAM(s) Oral daily  carvedilol 6.25 milliGRAM(s) Oral every 12 hours  chlorhexidine 2% Cloths 1 Application(s) Topical <User Schedule>  clopidogrel Tablet 75 milliGRAM(s) Oral daily  heparin  Infusion.  Unit(s)/Hr (10 mL/Hr) IV Continuous <Continuous>  influenza   Vaccine 0.5 milliLiter(s) IntraMuscular once  insulin lispro (ADMELOG) corrective regimen sliding scale   SubCutaneous every 6 hours  lactated ringers. 1000 milliLiter(s) (100 mL/Hr) IV Continuous <Continuous>  pantoprazole  Injectable 40 milliGRAM(s) IV Push two times a day    MEDICATIONS  (PRN):  HYDROmorphone  Injectable 0.5 milliGRAM(s) IV Push every 4 hours PRN Pain      PAST MEDICAL & SURGICAL HISTORY:  [ ] Reviewed     Functional Assessment: [ ] Independent  [ ] Assistance  [ ] Total care  [ ] Non-ambulatory    SOCIAL HISTORY:  Residence: [ ] [facility]  [ ] SNF  [ x] Community  Denies Smoking/ETOH/IVDU    FAMILY HISTORY:  [x ] No pertinent family history in first degree relatives     REVIEW OF SYSTEMS:    [x  ] Limited due to pt condition    PHYSICAL EXAM:    Vital Signs Last 24 Hrs  T(C): 38.7 (2022 11:00), Max: 38.7 (2022 09:00)  T(F): 101.7 (2022 11:00), Max: 101.7 (2022 09:00)  HR: 111 (2022 11:00) (91 - 121)  BP: 112/58 (2022 09:00) (98/55 - 135/74)  BP(mean): 78 (2022 09:00) (69 - 98)  RR: 33 (2022 11:00) (20 - 33)  SpO2: 98% (2022 11:00) (91% - 100%)    Parameters below as of 2022 11:00  Patient On (Oxygen Delivery Method): BiPAP/CPAP    O2 Concentration (%): 60    GENERAL: NAD, well-groomed  HEAD:  Atraumatic, Normocephalic  EYES: conjunctiva and sclera clear  ENMT: BIPAP in place ; Moist mucous membranes  NECK: Supple, No JVD  RESPIRATORY: Clear to auscultation bilaterally; No rales, rhonchi, wheezing, or rubs  CARDIOVASCULAR: Regular rate and rhythm; No murmurs, rubs, or gallops  GASTROINTESTINAL: Soft, Nontender, Nondistended; Bowel sounds present  EXTREMITIES:  2+ Peripheral Pulses, No clubbing, cyanosis, or edema  NERVOUS SYSTEM:  Alert & Oriented X3; Moving all 4 extremities; No gross sensory deficits  SKIN: No rashes or lesions    LABS:                        12.7   18.88 )-----------( 186      ( 2022 03:58 )             38.6     Hemoglobin: 12.7 g/dL ( @ 03:58)  Hemoglobin: 15.0 g/dL ( @ 07:57)        135  |  101  |  31<H>  ----------------------------<  195<H>  3.8   |  21<L>  |  1.95<H>    Ca    8.1<L>      2022 03:59  Phos  3.7       Mg     1.6         TPro  6.4  /  Alb  3.1<L>  /  TBili  1.8<H>  /  DBili  x   /  AST  262<H>  /  ALT  107<H>  /  AlkPhos  82      PT/INR - ( 2022 07:57 )   PT: 12.0 sec;   INR: 1.04 ratio         PTT - ( 2022 07:57 )  PTT:25.0 sec  Urinalysis Basic - ( 2022 10:37 )    Color: Yellow / Appearance: Slightly Turbid / S.027 / pH: x  Gluc: x / Ketone: Trace  / Bili: Negative / Urobili: Negative   Blood: x / Protein: 30 mg/dL / Nitrite: Negative   Leuk Esterase: Negative / RBC: x / WBC x   Sq Epi: x / Non Sq Epi: x / Bacteria: x      CAPILLARY BLOOD GLUCOSE      POCT Blood Glucose.: 170 mg/dL (2022 09:41)        RADIOLOGY & ADDITIONAL STUDIES:    EK/24  Personally Reviewed:  [x ] YES SINUS TACHYCARDIA , QTc 470      Imaging:   Personally Reviewed:  [x ] YES   CXR : Elevated right hemidiaphragm and left-sided pleural effusion.    CT ABD/PELVIS : Acute interstitial edematous pancreatitis. No peripancreatic fluid   collection.     TTE : EF 43%, Moderately dilated left atrium, Moderate segmental left ventricular systolic dysfunction.Akinesis of the  mid-basal anterolateral wall, inferior and inferolateral wall with all remaining walls hypokinetic, Moderate diastolic dysfunction (Stage II).                   [x] Care Discussed with Consultants/Other Providers: Trauma Team     TRAUMA/ACUTE CARE SURGERY - HOSPITAL MEDICINE CO-MANAGEMENT INITIAL VISIT NOTE    CHIEF COMPLAINT: Patient is a 62y old  Male who presents with a chief complaint of Acute cholecystitis, gallstone pancreatitis (2022 08:36)      HPI: Patient is a 61 y/o male with PMH of CABG, DM, HTN, HLD presenting as transfer from [place] for c/f STEMI. PTA arrival received 325 aspirin, 600 plavix, and no heparin drip started. Around 1:30 am patient had multiple episodes of non-bloody diarrhea and emesis with associated abdominal pain and chest pain, unable to localize, non-radiating, with associated SOB and no associated palpitations or diaphoresis. No recent fevers/chills, cough, rashes, or changes in urination. Does report mild diffuse back pain; started 5 days ago in setting on injury while walking and lifting objects. Denies focal weakness, numbness/tingling, or LOC due does report mild dizziness. Imaging concerning for acute cholecystitis, gallstone pancreatitis.  Overnight pt noted to be sating ; now requiring BIPAP. Also with uptrending hs trops to 2445. Seen by Cardiology and started on heparin gtt. Also spiked fever 101.7F this AM ; blood cultures sent.  Pt currently denies CP, SOB. He c/o diffuse abd pain worse in the left lower quadrant.          Allergies    No Known Allergies    Intolerances        HOME MEDICATIONS: [ ] Reviewed  Home Medications:  aspirin 81 mg oral tablet: 1 tab(s) orally once a day (2022 15:57)  carvedilol 6.25 mg oral tablet: 1 tab(s) orally 2 times a day (2022 15:59)  omeprazole 20 mg oral delayed release capsule: 1 cap(s) orally once a day (2022 16:02)  ramipril 2.5 mg oral tablet: 1 tab(s) orally once a day (2022 16:00)  rivaroxaban 2.5 mg oral tablet: 1 tab(s) orally once a day (2022 16:00)  rosuvastatin 10 mg oral tablet: 1 tab(s) orally once a day (2022 16:01)      MEDICATIONS  (STANDING):  acetaminophen   IVPB .. 1000 milliGRAM(s) IV Intermittent every 6 hours  albumin human 25% IVPB 100 milliLiter(s) IV Intermittent once  aspirin  chewable 81 milliGRAM(s) Oral daily  carvedilol 6.25 milliGRAM(s) Oral every 12 hours  chlorhexidine 2% Cloths 1 Application(s) Topical <User Schedule>  clopidogrel Tablet 75 milliGRAM(s) Oral daily  heparin  Infusion.  Unit(s)/Hr (10 mL/Hr) IV Continuous <Continuous>  influenza   Vaccine 0.5 milliLiter(s) IntraMuscular once  insulin lispro (ADMELOG) corrective regimen sliding scale   SubCutaneous every 6 hours  lactated ringers. 1000 milliLiter(s) (100 mL/Hr) IV Continuous <Continuous>  pantoprazole  Injectable 40 milliGRAM(s) IV Push two times a day    MEDICATIONS  (PRN):  HYDROmorphone  Injectable 0.5 milliGRAM(s) IV Push every 4 hours PRN Pain      PAST MEDICAL & SURGICAL HISTORY:  [ ] Reviewed     Functional Assessment: [ ] Independent  [ ] Assistance  [ ] Total care  [ ] Non-ambulatory    SOCIAL HISTORY:  Residence: [ ] [facility]  [ ] SNF  [ x] Community  Denies Smoking/ETOH/IVDU    FAMILY HISTORY:  [x ] No pertinent family history in first degree relatives     REVIEW OF SYSTEMS:    [x  ] Limited due to pt condition    PHYSICAL EXAM:    Vital Signs Last 24 Hrs  T(C): 38.7 (2022 11:00), Max: 38.7 (2022 09:00)  T(F): 101.7 (2022 11:00), Max: 101.7 (2022 09:00)  HR: 111 (2022 11:00) (91 - 121)  BP: 112/58 (2022 09:00) (98/55 - 135/74)  BP(mean): 78 (2022 09:00) (69 - 98)  RR: 33 (2022 11:00) (20 - 33)  SpO2: 98% (2022 11:00) (91% - 100%)    Parameters below as of 2022 11:00  Patient On (Oxygen Delivery Method): BiPAP/CPAP    O2 Concentration (%): 60    GENERAL: NAD, well-groomed  HEAD:  Atraumatic, Normocephalic  EYES: conjunctiva and sclera clear  ENMT: BIPAP in place ; Moist mucous membranes  NECK: Supple, No JVD  RESPIRATORY: Clear to auscultation bilaterally; No rales, rhonchi, wheezing, or rubs  CARDIOVASCULAR: Regular rate and rhythm; No murmurs, rubs, or gallops  GASTROINTESTINAL: Soft, Nontender, Nondistended; Bowel sounds present  EXTREMITIES:  2+ Peripheral Pulses, No clubbing, cyanosis, or edema  NERVOUS SYSTEM:  Alert & Oriented X3; Moving all 4 extremities; No gross sensory deficits  SKIN: No rashes or lesions    LABS:                        12.7   18.88 )-----------( 186      ( 2022 03:58 )             38.6     Hemoglobin: 12.7 g/dL ( @ 03:58)  Hemoglobin: 15.0 g/dL ( @ 07:57)        135  |  101  |  31<H>  ----------------------------<  195<H>  3.8   |  21<L>  |  1.95<H>    Ca    8.1<L>      2022 03:59  Phos  3.7       Mg     1.6         TPro  6.4  /  Alb  3.1<L>  /  TBili  1.8<H>  /  DBili  x   /  AST  262<H>  /  ALT  107<H>  /  AlkPhos  82      PT/INR - ( 2022 07:57 )   PT: 12.0 sec;   INR: 1.04 ratio         PTT - ( 2022 07:57 )  PTT:25.0 sec  Urinalysis Basic - ( 2022 10:37 )    Color: Yellow / Appearance: Slightly Turbid / S.027 / pH: x  Gluc: x / Ketone: Trace  / Bili: Negative / Urobili: Negative   Blood: x / Protein: 30 mg/dL / Nitrite: Negative   Leuk Esterase: Negative / RBC: x / WBC x   Sq Epi: x / Non Sq Epi: x / Bacteria: x      CAPILLARY BLOOD GLUCOSE      POCT Blood Glucose.: 170 mg/dL (2022 09:41)        RADIOLOGY & ADDITIONAL STUDIES:    EK/24  Personally Reviewed:  [x ] YES SINUS TACHYCARDIA , QTc 470      Imaging:   Personally Reviewed:  [x ] YES   CXR : Elevated right hemidiaphragm and left-sided pleural effusion.    CT ABD/PELVIS : Acute interstitial edematous pancreatitis. No peripancreatic fluid   collection.     TTE : EF 43%, Moderately dilated left atrium, Moderate segmental left ventricular systolic dysfunction.Akinesis of the  mid-basal anterolateral wall, inferior and inferolateral wall with all remaining walls hypokinetic, Moderate diastolic dysfunction (Stage II).                   [x] Care Discussed with Consultants/Other Providers: Trauma Team     TRAUMA/ACUTE CARE SURGERY - HOSPITAL MEDICINE CO-MANAGEMENT INITIAL VISIT NOTE    CHIEF COMPLAINT: Patient is a 62y old  Male who presents with a chief complaint of Acute cholecystitis, gallstone pancreatitis (2022 08:36)      HPI: Patient is a 61 y/o male with PMH of CABG, DM, HTN, HLD presenting as transfer from [place] for c/f STEMI. PTA arrival received 325 aspirin, 600 plavix, and no heparin drip started. Around 1:30 am patient had multiple episodes of non-bloody diarrhea and emesis with associated abdominal pain and chest pain, unable to localize, non-radiating, with associated SOB and no associated palpitations or diaphoresis. No recent fevers/chills, cough, rashes, or changes in urination. Does report mild diffuse back pain; started 5 days ago in setting on injury while walking and lifting objects. Denies focal weakness, numbness/tingling, or LOC due does report mild dizziness. Imaging concerning for acute cholecystitis, gallstone pancreatitis.  Overnight pt noted to be sating ; now requiring BIPAP. Also with uptrending hs trops to 2445. Seen by Cardiology and started on heparin gtt. Also spiked fever 101.7F this AM ; blood cultures sent.  Pt currently denies CP, SOB. He c/o diffuse abd pain worse in the left lower quadrant.          Allergies    No Known Allergies    Intolerances        HOME MEDICATIONS: [ ] Reviewed  Home Medications:  aspirin 81 mg oral tablet: 1 tab(s) orally once a day (2022 15:57)  carvedilol 6.25 mg oral tablet: 1 tab(s) orally 2 times a day (2022 15:59)  omeprazole 20 mg oral delayed release capsule: 1 cap(s) orally once a day (2022 16:02)  ramipril 2.5 mg oral tablet: 1 tab(s) orally once a day (2022 16:00)  rivaroxaban 2.5 mg oral tablet: 1 tab(s) orally once a day (2022 16:00)  rosuvastatin 10 mg oral tablet: 1 tab(s) orally once a day (2022 16:01)      MEDICATIONS  (STANDING):  acetaminophen   IVPB .. 1000 milliGRAM(s) IV Intermittent every 6 hours  albumin human 25% IVPB 100 milliLiter(s) IV Intermittent once  aspirin  chewable 81 milliGRAM(s) Oral daily  carvedilol 6.25 milliGRAM(s) Oral every 12 hours  chlorhexidine 2% Cloths 1 Application(s) Topical <User Schedule>  clopidogrel Tablet 75 milliGRAM(s) Oral daily  heparin  Infusion.  Unit(s)/Hr (10 mL/Hr) IV Continuous <Continuous>  influenza   Vaccine 0.5 milliLiter(s) IntraMuscular once  insulin lispro (ADMELOG) corrective regimen sliding scale   SubCutaneous every 6 hours  lactated ringers. 1000 milliLiter(s) (100 mL/Hr) IV Continuous <Continuous>  pantoprazole  Injectable 40 milliGRAM(s) IV Push two times a day    MEDICATIONS  (PRN):  HYDROmorphone  Injectable 0.5 milliGRAM(s) IV Push every 4 hours PRN Pain      PAST MEDICAL & SURGICAL HISTORY:  [ ] Reviewed     Functional Assessment: [ ] Independent  [ ] Assistance  [ ] Total care  [ ] Non-ambulatory    SOCIAL HISTORY:  Residence: [ ] [facility]  [ ] SNF  [ x] Community  Denies Smoking/ETOH/IVDU    FAMILY HISTORY:  [x ] No pertinent family history in first degree relatives     REVIEW OF SYSTEMS:    [x  ] Limited due to pt condition    PHYSICAL EXAM:    Vital Signs Last 24 Hrs  T(C): 38.7 (2022 11:00), Max: 38.7 (2022 09:00)  T(F): 101.7 (2022 11:00), Max: 101.7 (2022 09:00)  HR: 111 (2022 11:00) (91 - 121)  BP: 112/58 (2022 09:00) (98/55 - 135/74)  BP(mean): 78 (2022 09:00) (69 - 98)  RR: 33 (2022 11:00) (20 - 33)  SpO2: 98% (2022 11:00) (91% - 100%)    Parameters below as of 2022 11:00  Patient On (Oxygen Delivery Method): BiPAP/CPAP    O2 Concentration (%): 60    GENERAL: NAD, well-groomed  HEAD:  Atraumatic, Normocephalic  EYES: conjunctiva and sclera clear  ENMT: BIPAP in place ; Moist mucous membranes  NECK: Supple, No JVD  RESPIRATORY: Clear to auscultation bilaterally; No rales, rhonchi, wheezing, or rubs  CARDIOVASCULAR: Regular rate and rhythm; No murmurs, rubs, or gallops  GASTROINTESTINAL: Soft, Nontender, Nondistended; Bowel sounds present  EXTREMITIES:  2+ Peripheral Pulses, No clubbing, cyanosis, or edema  NERVOUS SYSTEM:  Alert & Oriented X3; Moving all 4 extremities; No gross sensory deficits  SKIN: No rashes or lesions    LABS:                        12.7   18.88 )-----------( 186      ( 2022 03:58 )             38.6     Hemoglobin: 12.7 g/dL ( @ 03:58)  Hemoglobin: 15.0 g/dL ( @ 07:57)        135  |  101  |  31<H>  ----------------------------<  195<H>  3.8   |  21<L>  |  1.95<H>    Ca    8.1<L>      2022 03:59  Phos  3.7       Mg     1.6         TPro  6.4  /  Alb  3.1<L>  /  TBili  1.8<H>  /  DBili  x   /  AST  262<H>  /  ALT  107<H>  /  AlkPhos  82      PT/INR - ( 2022 07:57 )   PT: 12.0 sec;   INR: 1.04 ratio         PTT - ( 2022 07:57 )  PTT:25.0 sec  Urinalysis Basic - ( 2022 10:37 )    Color: Yellow / Appearance: Slightly Turbid / S.027 / pH: x  Gluc: x / Ketone: Trace  / Bili: Negative / Urobili: Negative   Blood: x / Protein: 30 mg/dL / Nitrite: Negative   Leuk Esterase: Negative / RBC: x / WBC x   Sq Epi: x / Non Sq Epi: x / Bacteria: x      CAPILLARY BLOOD GLUCOSE      POCT Blood Glucose.: 170 mg/dL (2022 09:41)        RADIOLOGY & ADDITIONAL STUDIES:    EK/24  Personally Reviewed:  [x ] YES SINUS TACHYCARDIA , QTc 470      Imaging:   Personally Reviewed:  [x ] YES   CXR : Elevated right hemidiaphragm and left-sided pleural effusion.    CT ABD/PELVIS : Acute interstitial edematous pancreatitis. No peripancreatic fluid   collection.     TTE : EF 43%, Moderately dilated left atrium, Moderate segmental left ventricular systolic dysfunction.Akinesis of the  mid-basal anterolateral wall, inferior and inferolateral wall with all remaining walls hypokinetic, Moderate diastolic dysfunction (Stage II).                   [x] Care Discussed with Consultants/Other Providers: Trauma Team

## 2022-11-25 NOTE — CONSULT NOTE ADULT - PROBLEM SELECTOR RECOMMENDATION 3
- noted to desaturate to the 80s ; now requiring BIPAP  - concern for ARDS per primary team  - mgt per SICU protocol

## 2022-11-25 NOTE — CONSULT NOTE ADULT - PROBLEM SELECTOR RECOMMENDATION 7
[Nasal Congestion] : nasal congestion [Postnasal Drip] : postnasal drip [Cough] : cough [Wheezing] : wheezing [Negative] : Endocrine - currently NPO  - resume statin when tolerating PO

## 2022-11-25 NOTE — PROGRESS NOTE ADULT - SUBJECTIVE AND OBJECTIVE BOX
Trauma Surgery Daily Progress Note    Subjective:   Patient seen at bedside this AM. Denies acute onset abdominal pain, N/V/D, fevers, chills, SOB, CP, lightheadedness    24h Events:   - [place]. Was transferred to SICU once noted to have rising tropes.     Objective:  Vital Signs  T(C): 37.3 (11-25 @ 07:00), Max: 38 (11-24 @ 19:15)  HR: 100 (11-25 @ 08:06) (94 - 121)  BP: 122/60 (11-25 @ 08:06) (98/55 - 135/74)  RR: 20 (11-25 @ 08:11) (20 - 32)  SpO2: 96% (11-25 @ 08:11) (91% - 97%)  11-24-22 @ 07:01  -  11-25-22 @ 07:00  --------------------------------------------------------  IN:  Total IN: 0 mL    OUT:    Voided (mL): 675 mL  Total OUT: 675 mL    Total NET: -675 mL          Physical Exam:  GEN: resting in bed comfortably in NAD  RESP: no increased WOB  ABD: soft, non-distended, non-tender without rebound tenderness or guarding  EXTR: warm, well-perfused without gross deformities; spontaneous movement in b/l U/L extrem  NEURO: AAOx4    Labs:                        12.7   18.88 )-----------( 186      ( 25 Nov 2022 03:58 )             38.6   11-25    135  |  101  |  31<H>  ----------------------------<  195<H>  3.8   |  21<L>  |  1.95<H>    Ca    8.1<L>      25 Nov 2022 03:59  Phos  3.7     11-25  Mg     1.6     11-25    TPro  6.4  /  Alb  3.1<L>  /  TBili  1.8<H>  /  DBili  x   /  AST  262<H>  /  ALT  107<H>  /  AlkPhos  82  11-25    CAPILLARY BLOOD GLUCOSE      POCT Blood Glucose.: 184 mg/dL (24 Nov 2022 23:43)  POCT Blood Glucose.: 233 mg/dL (24 Nov 2022 09:58)      Medications:   MEDICATIONS  (STANDING):  acetaminophen   IVPB .. 1000 milliGRAM(s) IV Intermittent every 6 hours  aspirin  chewable 81 milliGRAM(s) Oral daily  carvedilol 6.25 milliGRAM(s) Oral every 12 hours  clopidogrel Tablet 75 milliGRAM(s) Oral daily  heparin  Infusion.  Unit(s)/Hr (10 mL/Hr) IV Continuous <Continuous>  influenza   Vaccine 0.5 milliLiter(s) IntraMuscular once  insulin lispro (ADMELOG) corrective regimen sliding scale   SubCutaneous three times a day before meals  insulin lispro (ADMELOG) corrective regimen sliding scale   SubCutaneous at bedtime  lactated ringers. 1000 milliLiter(s) (100 mL/Hr) IV Continuous <Continuous>  pantoprazole  Injectable 40 milliGRAM(s) IV Push two times a day    MEDICATIONS  (PRN):  HYDROmorphone  Injectable 0.5 milliGRAM(s) IV Push every 4 hours PRN Pain      Imaging:

## 2022-11-25 NOTE — PROGRESS NOTE ADULT - ASSESSMENT
63 y/o male with PMH of CABG, DM, HTN, HLD presents with abdominal pain, diarrhea and n/v for 1 day. Found to have acute cholecystitis, gallstone pancreatitis.    Plan:    - f/u cardiology recs: tele, serial troponin, bedside ECHO today  - on DAPT, heparin drip  - NPO/IVF  - IV abx  - Pain control PRN  - rest of care as per SICU    - GI consult    ACS/Trauma Surgery  [id number]  61 y/o male with PMH of CABG, DM, HTN, HLD presents with abdominal pain, diarrhea and n/v for 1 day. Found to have acute cholecystitis, gallstone pancreatitis.    Plan:    - f/u cardiology recs: tele, serial troponin, bedside ECHO today  - on DAPT, heparin drip  - NPO/IVF  - IV abx  - Pain control PRN  - rest of care as per SICU    - GI consult    ACS/Trauma Surgery  [id number]  61 y/o male with PMH of CABG, DM, HTN, HLD presents with abdominal pain, diarrhea and n/v for 1 day. Found to have acute cholecystitis, gallstone pancreatitis.    Plan:    - f/u cardiology recs: tele, serial troponin, bedside ECHO today  - on DAPT, heparin drip  - NPO/IVF  - IV abx  - Pain control PRN  - rest of care as per SICU    - GI consult    ACS/Trauma Surgery  [id number]  63 y/o male with PMH of CABG, DM, HTN, HLD presents with abdominal pain, diarrhea and n/v for 1 day. Found to have acute cholecystitis, gallstone pancreatitis.    Plan:    - f/u cardiology recs: tele, serial troponin, bedside ECHO today  - MRCP today  - surgical internvention of gallbladder when stable from cardiac/medical standpoint  - on DAPT, heparin drip  - NPO/IVF  - IV abx  - Pain control PRN  - rest of care as per SICU    - GI consult    ACS/Trauma Surgery  [id number]  63 y/o male with PMH of CABG, DM, HTN, HLD presents with abdominal pain, diarrhea and n/v for 1 day. Found to have acute cholecystitis, gallstone pancreatitis.    Plan:    - f/u cardiology recs: tele, serial troponin, bedside ECHO today  - MRCP today  - surgical internvention of gallbladder when stable from cardiac/medical standpoint  - on DAPT, heparin drip  - NPO/IVF  - IV abx  - Pain control PRN  - rest of care as per SICU    - GI consult    ACS/Trauma Surgery  [id number]  61 y/o male with PMH of CABG, DM, HTN, HLD presents with abdominal pain, diarrhea and n/v for 1 day. Found to have acute cholecystitis, gallstone pancreatitis.    Plan:    - f/u cardiology recs: tele, serial troponin, bedside ECHO today  - MRCP today  - surgical internvention of gallbladder when stable from cardiac/medical standpoint  - on DAPT, heparin drip  - NPO/IVF  - IV abx  - Pain control PRN  - rest of care as per SICU    - GI consult    ACS/Trauma Surgery  [id number]  61 y/o male with PMH of CABG, DM, HTN, HLD presents with abdominal pain, diarrhea and n/v for 1 day. Found to have acute cholecystitis, gallstone pancreatitis.    Plan:    - f/u cardiology recs: tele, serial troponin, bedside ECHO today  - MRCP today  - surgical internvention of gallbladder when stable from cardiac/medical standpoint  - appreciate updated card recs: troponin uptrending to 2000  - on DAPT, heparin drip  - NPO/IVF  - IV abx  - Pain control PRN  - rest of care as per SICU    - GI consult    ACS/Trauma Surgery  [id number]  63 y/o male with PMH of CABG, DM, HTN, HLD presents with abdominal pain, diarrhea and n/v for 1 day. Found to have acute cholecystitis, gallstone pancreatitis.    Plan:    - f/u cardiology recs: tele, serial troponin, bedside ECHO today  - MRCP today  - surgical internvention of gallbladder when stable from cardiac/medical standpoint  - appreciate updated card recs: troponin uptrending to 2000  - on DAPT, heparin drip  - NPO/IVF  - IV abx  - Pain control PRN  - rest of care as per SICU    - GI consult    ACS/Trauma Surgery  [id number]  63 y/o male with PMH of CABG, DM, HTN, HLD presents with abdominal pain, diarrhea and n/v for 1 day. Found to have acute cholecystitis, gallstone pancreatitis.    Plan:    - f/u cardiology recs: tele, serial troponin, bedside ECHO today  - MRCP today  - surgical internvention of gallbladder when stable from cardiac/medical standpoint  - appreciate updated card recs: troponin uptrending to 2000  - on DAPT, heparin drip  - NPO/IVF  - IV abx  - Pain control PRN  - rest of care as per SICU    - GI consult    ACS/Trauma Surgery  [id number]

## 2022-11-25 NOTE — PROGRESS NOTE ADULT - ATTENDING COMMENTS
62 year old male with history of 4v CABG in [facility] approx 3 years ago (no stents), T2DM, HTN, HLD who presented to  ED with abd pain, N/V, found to have gallstone pancreatitis. He was transferred to [facility] for ischemic ECG changes.     hsTrop 207 to 455 to 1403 to 2445 to [phone] noted  TTE shows mild LV dysfunction (EF 43%), akinesis of mid-basal anterolateral wall, inferior and inferolateral walls with moderate diastolic dysfunction. There is normal RV size and function. Mild MR. Estimated RA pressure is 8.    -pt is currently chest pain free  -Case was discussed with interventionalist on call Dr. De La Rosa and day time interventionalist Dr. Montes, would defer LHC at this time and medically treat patient for NSTEMI at this time given active gallstone pancreatitis  -continue DAPT and heparin gtt  -continue to trend troponins till downtrend, serial ECGs  -start statin if able from LFT/surgical perspective  -appreciate SICU care 62 year old male with history of 4v CABG in [facility] approx 3 years ago (no stents), T2DM, HTN, HLD who presented to  ED with abd pain, N/V, found to have gallstone pancreatitis. He was transferred to [facility] for ischemic ECG changes.     hsTrop 207 to 455 to 1403 to 2445 to [phone] noted  TTE shows mild LV dysfunction (EF 43%), akinesis of mid-basal anterolateral wall, inferior and inferolateral walls with moderate diastolic dysfunction. There is normal RV size and function. Mild MR. Estimated RA pressure is 8.    -pt is currently chest pain free  -Case was discussed with interventionalist on call Dr. De La Rosa and day time interventionalist Dr. Montes, would defer LHC at this time and medically treat patient for NSTEMI at this time given active gallstone pancreatitis  -continue DAPT and heparin gtt  -continue to trend troponins till downtrend, serial ECGs  -start statin if able from LFT/surgical perspective  -appreciate SICU care 62 year old male with history of 4v CABG in [facility] approx 3 years ago (no stents), T2DM, HTN, HLD who presented to  ED with abd pain, N/V, found to have gallstone pancreatitis. He was transferred to [facility] for ischemic ECG changes.     hsTrop 207 to 455 to 1403 to 2445 to [phone] noted  TTE shows mild LV dysfunction (EF 43%), akinesis of mid-basal anterolateral wall, inferior and inferolateral walls with moderate diastolic dysfunction. There is normal RV size and function. Mild MR. Estimated RA pressure is 8.    -pt is currently chest pain free  -Case was discussed with interventionalist on call Dr. De La Rosa and day time interventionalist Dr. Montes, would defer LHC at this time and medically treat patient for NSTEMI at this time given active gallstone pancreatitis  -continue DAPT and heparin gtt  -continue to trend troponins till downtrend, serial ECGs  -start statin if able from LFT/surgical perspective  -appreciate SICU care

## 2022-11-25 NOTE — PROGRESS NOTE ADULT - SUBJECTIVE AND OBJECTIVE BOX
Patient seen and examined at the bedside.    Remained critically ill on continuous ICU monitoring.    OBJECTIVE:  Vital Signs Last 24 Hrs  T(C): 38.7 (25 Nov 2022 11:00), Max: 38.7 (25 Nov 2022 09:00)  T(F): 101.7 (25 Nov 2022 11:00), Max: 101.7 (25 Nov 2022 09:00)  HR: 91 (25 Nov 2022 19:45) (0 - 148)  BP: 104/61 (25 Nov 2022 12:30) (87/52 - 133/68)  BP(mean): 76 (25 Nov 2022 12:30) (64 - 94)  RR: 10 (25 Nov 2022 19:45) (0 - 127)  SpO2: 95% (25 Nov 2022 19:30) (67% - 100%)    Parameters below as of 25 Nov 2022 18:10  Patient On (Oxygen Delivery Method): ventilator    O2 Concentration (%): 100      Physical Exam:   General: Intubated, multiple lines gtt  Neurology: sedated  Eyes: bilateral pupils equal and reactive   ENT/Neck: +ETT midline, Neck supple, trachea midline, No JVD   Respiratory: Clear bilaterally   CV: S1S2, no murmurs        [x] Sinus rhythm  Abdominal: Soft, NT, ND +BS   Extremities: 1-2+ pedal edema noted, + peripheral pulses   Skin: No Rashes, Hematoma, Ecchymosis                           Assessment:  61 y/o male with PMH of CABG, DM, HTN, HLD presenting as transfer from [place] for c/f STEMI. PTA arrival received 325 aspirin, 600 plavix, and no heparin drip started. Around 1:30 am patient had multiple episodes of non-bloody diarrhea and emesis with associated abdominal pain and chest pain, unable to localize, non-radiating, with associated SOB and no associated palpitations or diaphoresis. No recent fevers/chills, cough, rashes, or changes in urination. Does report mild diffuse back pain; started 5 days ago in setting on injury while walking and lifting objects. Denies focal weakness, numbness/tingling, or LOC due does report mild dizziness.        Hemodynamic instability  Hypovolemia  Lactic acidosis  Post op respiratory insufficiency  Acute blood loss anemia  Leukocytosis           Plan:   ***Neuro***  [x] Sedated with [x] Diprivan   Post operative neuro assessment     ***Cardiovascular***  Invasive hemodynamic monitoring, assess perfusion indices   SR / CVP 10/ MAP 60/ Hct 32.4/ Lactate >16  [x] Levophed- 0.05 mcgs/kg/min  [x] ECMO- 4200 rpms, 3.25 flow  Volume:  [x] LR 1L   Reassessment of hemodynamics post resuscitation   Went into Bradycardic and Hypoxic arrest today  Serial EKG and cardiac enzymes     ***Pulmonary***  [x]  Nitric oxide- 40 ppm   Post op vent management   Titration of FiO2 and PEEP, follow SpO2, CXR, blood gasses     Mode: AC/ CMV (Assist Control/ Continuous Mandatory Ventilation)  RR (machine): 12  TV (machine): 400  FiO2: 100  PEEP: 12  ITime: 1.2  MAP: 17  PC: 16  PIP: 28              ***GI***  [x] NPO   [x] Protonix     ***Renal***  Continue to monitor I/Os, BUN/Creatinine.   Replete lytes PRN  Va present    ***ID***  Invanz for intra- abdominal infections     ***Endocrine***  [x]  DM2: HbA1c 7.3%                - [x] ISS             - Need tight glycemic control to prevent wound infection.        Patient requires continuous monitoring with bedside rhythm monitoring, pulse oximetry monitoring, and continuous central venous and arterial pressure monitoring; and intermittent blood gas analysis. Care plan discussed with the ICU care team.   Patient remained critical, at risk for life threatening decompensation.    I have spent 75 minutes providing critical care management to this patient.    By signing my name below, I, Abhijit Ngo, attest that this documentation has been prepared under the direction and in the presence of BROOKE Venegas   Electronically signed: Brian Slaughter, 11-25-22 @ 19:53    I, BROOKE Venegas, personally performed the services described in this documentation. all medical record entries made by the karlaibkyle were at my direction and in my presence. I have reviewed the chart and agree that the record reflects my personal performance and is accurate and complete  Electronically signed: BROOKE Venegas  Patient seen and examined at the bedside.    Remained critically ill on continuous ICU monitoring.    OBJECTIVE:  Vital Signs Last 24 Hrs  T(C): 38.7 (25 Nov 2022 11:00), Max: 38.7 (25 Nov 2022 09:00)  T(F): 101.7 (25 Nov 2022 11:00), Max: 101.7 (25 Nov 2022 09:00)  HR: 91 (25 Nov 2022 19:45) (0 - 148)  BP: 104/61 (25 Nov 2022 12:30) (87/52 - 133/68)  BP(mean): 76 (25 Nov 2022 12:30) (64 - 94)  RR: 10 (25 Nov 2022 19:45) (0 - 127)  SpO2: 95% (25 Nov 2022 19:30) (67% - 100%)    Parameters below as of 25 Nov 2022 18:10  Patient On (Oxygen Delivery Method): ventilator    O2 Concentration (%): 100      Physical Exam:   General: Intubated, multiple lines gtt  Neurology: sedated  Eyes: bilateral pupils equal and reactive   ENT/Neck: +ETT midline, Neck supple, trachea midline, No JVD   Respiratory: Clear bilaterally   CV: S1S2, no murmurs        [x] Sinus rhythm  Abdominal: Soft, NT, ND +BS   Extremities: 1-2+ pedal edema noted, + peripheral pulses   Skin: No Rashes, Hematoma, Ecchymosis                           Assessment:  63 y/o male with PMH of CABG, DM, HTN, HLD presenting as transfer from [place] for c/f STEMI. PTA arrival received 325 aspirin, 600 plavix, and no heparin drip started. Around 1:30 am patient had multiple episodes of non-bloody diarrhea and emesis with associated abdominal pain and chest pain, unable to localize, non-radiating, with associated SOB and no associated palpitations or diaphoresis. No recent fevers/chills, cough, rashes, or changes in urination. Does report mild diffuse back pain; started 5 days ago in setting on injury while walking and lifting objects. Denies focal weakness, numbness/tingling, or LOC due does report mild dizziness.        Hemodynamic instability  Hypovolemia  Lactic acidosis  Post op respiratory insufficiency  Acute blood loss anemia  Leukocytosis           Plan:   ***Neuro***  [x] Sedated with [x] Diprivan   Post operative neuro assessment     ***Cardiovascular***  Invasive hemodynamic monitoring, assess perfusion indices   SR / CVP 10/ MAP 60/ Hct 32.4/ Lactate >16  [x] Levophed- 0.05 mcgs/kg/min  [x] ECMO- 4200 rpms, 3.25 flow  Volume:  [x] LR 1L   Reassessment of hemodynamics post resuscitation   Went into Bradycardic and Hypoxic arrest today  Serial EKG and cardiac enzymes     ***Pulmonary***  [x]  Nitric oxide- 40 ppm   Post op vent management   Titration of FiO2 and PEEP, follow SpO2, CXR, blood gasses     Mode: AC/ CMV (Assist Control/ Continuous Mandatory Ventilation)  RR (machine): 12  TV (machine): 400  FiO2: 100  PEEP: 12  ITime: 1.2  MAP: 17  PC: 16  PIP: 28              ***GI***  [x] NPO   [x] Protonix     ***Renal***  Continue to monitor I/Os, BUN/Creatinine.   Replete lytes PRN  Va present    ***ID***  Invanz for intra- abdominal infections     ***Endocrine***  [x]  DM2: HbA1c 7.3%                - [x] ISS             - Need tight glycemic control to prevent wound infection.        Patient requires continuous monitoring with bedside rhythm monitoring, pulse oximetry monitoring, and continuous central venous and arterial pressure monitoring; and intermittent blood gas analysis. Care plan discussed with the ICU care team.   Patient remained critical, at risk for life threatening decompensation.    I have spent 75 minutes providing critical care management to this patient.    By signing my name below, I, Abhijit Ngo, attest that this documentation has been prepared under the direction and in the presence of BROOKE Venegas   Electronically signed: Brian Slaughter, 11-25-22 @ 19:53    I, BROOKE Venegas, personally performed the services described in this documentation. all medical record entries made by the karlaibkyle were at my direction and in my presence. I have reviewed the chart and agree that the record reflects my personal performance and is accurate and complete  Electronically signed: BROOKE Venegas  Patient seen and examined at the bedside.    Remained critically ill on continuous ICU monitoring.    OBJECTIVE:  Vital Signs Last 24 Hrs  T(C): 38.7 (25 Nov 2022 11:00), Max: 38.7 (25 Nov 2022 09:00)  T(F): 101.7 (25 Nov 2022 11:00), Max: 101.7 (25 Nov 2022 09:00)  HR: 91 (25 Nov 2022 19:45) (0 - 148)  BP: 104/61 (25 Nov 2022 12:30) (87/52 - 133/68)  BP(mean): 76 (25 Nov 2022 12:30) (64 - 94)  RR: 10 (25 Nov 2022 19:45) (0 - 127)  SpO2: 95% (25 Nov 2022 19:30) (67% - 100%)    Parameters below as of 25 Nov 2022 18:10  Patient On (Oxygen Delivery Method): ventilator    O2 Concentration (%): 100      Physical Exam:   General: Intubated, multiple lines gtt  Neurology: sedated  Eyes: bilateral pupils equal and reactive   ENT/Neck: +ETT midline, Neck supple, trachea midline, No JVD   Respiratory: Clear bilaterally   CV: S1S2, no murmurs        [x] Sinus rhythm  Abdominal: Soft, NT, ND +BS   Extremities: 1-2+ pedal edema noted, + peripheral pulses   Skin: No Rashes, Hematoma, Ecchymosis                           Assessment:  63 y/o male with PMH of CABG, DM, HTN, HLD presenting as transfer from [place] for c/f STEMI. PTA arrival received 325 aspirin, 600 plavix, and no heparin drip started. Around 1:30 am patient had multiple episodes of non-bloody diarrhea and emesis with associated abdominal pain and chest pain, unable to localize, non-radiating, with associated SOB and no associated palpitations or diaphoresis. No recent fevers/chills, cough, rashes, or changes in urination. Does report mild diffuse back pain; started 5 days ago in setting on injury while walking and lifting objects. Denies focal weakness, numbness/tingling, or LOC due does report mild dizziness.        Hemodynamic instability  Hypovolemia  Lactic acidosis  Post op respiratory insufficiency  Acute blood loss anemia  Leukocytosis           Plan:   ***Neuro***  [x] Sedated with [x] Diprivan   Post operative neuro assessment     ***Cardiovascular***  Invasive hemodynamic monitoring, assess perfusion indices   SR / CVP 10/ MAP 60/ Hct 32.4/ Lactate >16  [x] Levophed- 0.05 mcgs/kg/min  [x] ECMO- 4200 rpms, 3.25 flow  Volume:  [x] LR 1L   Reassessment of hemodynamics post resuscitation   Went into Bradycardic and Hypoxic arrest today  Serial EKG and cardiac enzymes     ***Pulmonary***  [x]  Nitric oxide- 40 ppm   Post op vent management   Titration of FiO2 and PEEP, follow SpO2, CXR, blood gasses     Mode: AC/ CMV (Assist Control/ Continuous Mandatory Ventilation)  RR (machine): 12  TV (machine): 400  FiO2: 100  PEEP: 12  ITime: 1.2  MAP: 17  PC: 16  PIP: 28              ***GI***  [x] NPO   [x] Protonix     ***Renal***  Continue to monitor I/Os, BUN/Creatinine.   Replete lytes PRN  Va present    ***ID***  Invanz for intra- abdominal infections     ***Endocrine***  [x]  DM2: HbA1c 7.3%                - [x] ISS             - Need tight glycemic control to prevent wound infection.        Patient requires continuous monitoring with bedside rhythm monitoring, pulse oximetry monitoring, and continuous central venous and arterial pressure monitoring; and intermittent blood gas analysis. Care plan discussed with the ICU care team.   Patient remained critical, at risk for life threatening decompensation.    I have spent 75 minutes providing critical care management to this patient.    By signing my name below, I, Abhijit Ngo, attest that this documentation has been prepared under the direction and in the presence of BROOKE Venegas   Electronically signed: Brian Slaughter, 11-25-22 @ 19:53    I, BROOKE Venegas, personally performed the services described in this documentation. all medical record entries made by the karlaibkyle were at my direction and in my presence. I have reviewed the chart and agree that the record reflects my personal performance and is accurate and complete  Electronically signed: BROOKE Venegas  Patient seen and examined at the bedside.    Remained critically ill on continuous ICU monitoring.    OBJECTIVE:  Vital Signs Last 24 Hrs  T(C): 38.7 (25 Nov 2022 11:00), Max: 38.7 (25 Nov 2022 09:00)  T(F): 101.7 (25 Nov 2022 11:00), Max: 101.7 (25 Nov 2022 09:00)  HR: 91 (25 Nov 2022 19:45) (0 - 148)  BP: 104/61 (25 Nov 2022 12:30) (87/52 - 133/68)  BP(mean): 76 (25 Nov 2022 12:30) (64 - 94)  RR: 10 (25 Nov 2022 19:45) (0 - 127)  SpO2: 95% (25 Nov 2022 19:30) (67% - 100%)    Parameters below as of 25 Nov 2022 18:10  Patient On (Oxygen Delivery Method): ventilator    O2 Concentration (%): 100      Physical Exam:   General: Intubated, multiple lines gtt  Neurology: sedated  Eyes: bilateral pupils equal and reactive   ENT/Neck: +ETT midline, Neck supple, trachea midline, No JVD   Respiratory: Clear bilaterally   CV: S1S2, no murmurs        [x] Sinus rhythm  Abdominal: Soft, NT, ND +BS   Extremities: 1-2+ pedal edema noted, + peripheral pulses   Skin: No Rashes, Hematoma, Ecchymosis                           Assessment:  63 y/o male with PMH of CABG, DM, HTN, HLD presenting as transfer from [place] for c/f STEMI. PTA arrival received 325 aspirin, 600 plavix, and no heparin drip started. Around 1:30 am patient had multiple episodes of non-bloody diarrhea and emesis with associated abdominal pain and chest pain, unable to localize, non-radiating, with associated SOB and no associated palpitations or diaphoresis. No recent fevers/chills, cough, rashes, or changes in urination. Does report mild diffuse back pain; started 5 days ago in setting on injury while walking and lifting objects. Denies focal weakness, numbness/tingling, or LOC due does report mild dizziness.        Hemodynamic instability  Hypovolemia  Lactic acidosis  Post op respiratory insufficiency  Acute blood loss anemia  Leukocytosis           Plan:   ***Neuro***  [x] Sedated with [x] Diprivan   Post operative neuro assessment     ***Cardiovascular***  Invasive hemodynamic monitoring, assess perfusion indices   SR / CVP 10/ MAP 60/ Hct 32.4/ Lactate >16  [x] Levophed- 0.05 mcgs/kg/min  [x] ECMO- 4200 rpms, 3.25 flow  Volume:  [x] LR 1L   Reassessment of hemodynamics post resuscitation   Went into Bradycardic and Hypoxic arrest today  Serial EKG and cardiac enzymes     ***Pulmonary***  [x]  Nitric oxide- 40 ppm   Post op vent management   Titration of FiO2 and PEEP, follow SpO2, CXR, blood gasses     Mode: AC/ CMV (Assist Control/ Continuous Mandatory Ventilation)  RR (machine): 12  TV (machine): 400  FiO2: 100  PEEP: 12  ITime: 1.2  MAP: 17  PC: 16  PIP: 28              ***GI***  [x] NPO   [x] Protonix     ***Renal***  Continue to monitor I/Os, BUN/Creatinine.   Replete lytes PRN  De Dios present    ***ID***  Meropenem and Vanco by level for empiric coverage  Follow up cultures    ***Endocrine***  [x]  DM2: HbA1c 7.3%                - [x] ISS             - Need tight glycemic control to prevent wound infection.        Patient requires continuous monitoring with bedside rhythm monitoring, pulse oximetry monitoring, and continuous central venous and arterial pressure monitoring; and intermittent blood gas analysis. Care plan discussed with the ICU care team.   Patient remained critical, at risk for life threatening decompensation.    I have spent 75 minutes providing critical care management to this patient.    By signing my name below, I, Abhijit Ngo, attest that this documentation has been prepared under the direction and in the presence of BROOKE Venegas   Electronically signed: Brian Slaughter, 11-25-22 @ 19:53    I, BROOKE Venegas, personally performed the services described in this documentation. all medical record entries made by the karlaibkyle were at my direction and in my presence. I have reviewed the chart and agree that the record reflects my personal performance and is accurate and complete  Electronically signed: BROOKE Venegas  Patient seen and examined at the bedside.    Remained critically ill on continuous ICU monitoring.    OBJECTIVE:  Vital Signs Last 24 Hrs  T(C): 38.7 (25 Nov 2022 11:00), Max: 38.7 (25 Nov 2022 09:00)  T(F): 101.7 (25 Nov 2022 11:00), Max: 101.7 (25 Nov 2022 09:00)  HR: 91 (25 Nov 2022 19:45) (0 - 148)  BP: 104/61 (25 Nov 2022 12:30) (87/52 - 133/68)  BP(mean): 76 (25 Nov 2022 12:30) (64 - 94)  RR: 10 (25 Nov 2022 19:45) (0 - 127)  SpO2: 95% (25 Nov 2022 19:30) (67% - 100%)    Parameters below as of 25 Nov 2022 18:10  Patient On (Oxygen Delivery Method): ventilator    O2 Concentration (%): 100      Physical Exam:   General: Intubated, multiple lines gtt  Neurology: sedated  Eyes: bilateral pupils equal and reactive   ENT/Neck: +ETT midline, Neck supple, trachea midline, No JVD   Respiratory: Clear bilaterally   CV: S1S2, no murmurs        [x] Sinus rhythm  Abdominal: Soft, NT, ND +BS   Extremities: 1-2+ pedal edema noted, + peripheral pulses   Skin: No Rashes, Hematoma, Ecchymosis                           Assessment:  61 y/o male with PMH of CABG, DM, HTN, HLD presenting as transfer from [place] for c/f STEMI. PTA arrival received 325 aspirin, 600 plavix, and no heparin drip started. Around 1:30 am patient had multiple episodes of non-bloody diarrhea and emesis with associated abdominal pain and chest pain, unable to localize, non-radiating, with associated SOB and no associated palpitations or diaphoresis. No recent fevers/chills, cough, rashes, or changes in urination. Does report mild diffuse back pain; started 5 days ago in setting on injury while walking and lifting objects. Denies focal weakness, numbness/tingling, or LOC due does report mild dizziness.        Hemodynamic instability  Hypovolemia  Lactic acidosis  Post op respiratory insufficiency  Acute blood loss anemia  Leukocytosis           Plan:   ***Neuro***  [x] Sedated with [x] Diprivan   Post operative neuro assessment     ***Cardiovascular***  Invasive hemodynamic monitoring, assess perfusion indices   SR / CVP 10/ MAP 60/ Hct 32.4/ Lactate >16  [x] Levophed- 0.05 mcgs/kg/min  [x] ECMO- 4200 rpms, 3.25 flow  Volume:  [x] LR 1L   Reassessment of hemodynamics post resuscitation   Went into Bradycardic and Hypoxic arrest today  Serial EKG and cardiac enzymes     ***Pulmonary***  [x]  Nitric oxide- 40 ppm   Post op vent management   Titration of FiO2 and PEEP, follow SpO2, CXR, blood gasses     Mode: AC/ CMV (Assist Control/ Continuous Mandatory Ventilation)  RR (machine): 12  TV (machine): 400  FiO2: 100  PEEP: 12  ITime: 1.2  MAP: 17  PC: 16  PIP: 28              ***GI***  [x] NPO   [x] Protonix     ***Renal***  Continue to monitor I/Os, BUN/Creatinine.   Replete lytes PRN  De Dios present    ***ID***  Meropenem and Vanco by level for empiric coverage  Follow up cultures    ***Endocrine***  [x]  DM2: HbA1c 7.3%                - [x] ISS             - Need tight glycemic control to prevent wound infection.        Patient requires continuous monitoring with bedside rhythm monitoring, pulse oximetry monitoring, and continuous central venous and arterial pressure monitoring; and intermittent blood gas analysis. Care plan discussed with the ICU care team.   Patient remained critical, at risk for life threatening decompensation.    I have spent 75 minutes providing critical care management to this patient.    By signing my name below, I, Abhijit Ngo, attest that this documentation has been prepared under the direction and in the presence of BROOKE Venegas   Electronically signed: Brian Slaughter, 11-25-22 @ 19:53    I, BROOKE Venegas, personally performed the services described in this documentation. all medical record entries made by the karlaibkyle were at my direction and in my presence. I have reviewed the chart and agree that the record reflects my personal performance and is accurate and complete  Electronically signed: BROOKE Venegas  Patient seen and examined at the bedside.    Remained critically ill on continuous ICU monitoring.    OBJECTIVE:  Vital Signs Last 24 Hrs  T(C): 38.7 (25 Nov 2022 11:00), Max: 38.7 (25 Nov 2022 09:00)  T(F): 101.7 (25 Nov 2022 11:00), Max: 101.7 (25 Nov 2022 09:00)  HR: 91 (25 Nov 2022 19:45) (0 - 148)  BP: 104/61 (25 Nov 2022 12:30) (87/52 - 133/68)  BP(mean): 76 (25 Nov 2022 12:30) (64 - 94)  RR: 10 (25 Nov 2022 19:45) (0 - 127)  SpO2: 95% (25 Nov 2022 19:30) (67% - 100%)    Parameters below as of 25 Nov 2022 18:10  Patient On (Oxygen Delivery Method): ventilator    O2 Concentration (%): 100      Physical Exam:   General: Intubated, multiple lines gtt  Neurology: sedated  Eyes: bilateral pupils equal and reactive   ENT/Neck: +ETT midline, Neck supple, trachea midline, No JVD   Respiratory: Clear bilaterally   CV: S1S2, no murmurs        [x] Sinus rhythm  Abdominal: Soft, NT, ND +BS   Extremities: 1-2+ pedal edema noted, + peripheral pulses   Skin: No Rashes, Hematoma, Ecchymosis                           Assessment:  63 y/o male with PMH of CABG, DM, HTN, HLD presenting as transfer from [place] for c/f STEMI. PTA arrival received 325 aspirin, 600 plavix, and no heparin drip started. Around 1:30 am patient had multiple episodes of non-bloody diarrhea and emesis with associated abdominal pain and chest pain, unable to localize, non-radiating, with associated SOB and no associated palpitations or diaphoresis. No recent fevers/chills, cough, rashes, or changes in urination. Does report mild diffuse back pain; started 5 days ago in setting on injury while walking and lifting objects. Denies focal weakness, numbness/tingling, or LOC due does report mild dizziness.        Hemodynamic instability  Hypovolemia  Lactic acidosis  Post op respiratory insufficiency  Acute blood loss anemia  Leukocytosis           Plan:   ***Neuro***  [x] Sedated with [x] Diprivan   Post operative neuro assessment     ***Cardiovascular***  Invasive hemodynamic monitoring, assess perfusion indices   SR / CVP 10/ MAP 60/ Hct 32.4/ Lactate >16  [x] Levophed- 0.05 mcgs/kg/min  [x] ECMO- 4200 rpms, 3.25 flow  Volume:  [x] LR 1L   Reassessment of hemodynamics post resuscitation   Went into Bradycardic and Hypoxic arrest today  Serial EKG and cardiac enzymes     ***Pulmonary***  [x]  Nitric oxide- 40 ppm   Post op vent management   Titration of FiO2 and PEEP, follow SpO2, CXR, blood gasses     Mode: AC/ CMV (Assist Control/ Continuous Mandatory Ventilation)  RR (machine): 12  TV (machine): 400  FiO2: 100  PEEP: 12  ITime: 1.2  MAP: 17  PC: 16  PIP: 28              ***GI***  [x] NPO   [x] Protonix     ***Renal***  Continue to monitor I/Os, BUN/Creatinine.   Replete lytes PRN  De Dios present    ***ID***  Meropenem and Vanco by level for empiric coverage  Follow up cultures    ***Endocrine***  [x]  DM2: HbA1c 7.3%                - [x] ISS             - Need tight glycemic control to prevent wound infection.        Patient requires continuous monitoring with bedside rhythm monitoring, pulse oximetry monitoring, and continuous central venous and arterial pressure monitoring; and intermittent blood gas analysis. Care plan discussed with the ICU care team.   Patient remained critical, at risk for life threatening decompensation.    I have spent 75 minutes providing critical care management to this patient.    By signing my name below, I, Abhijit Ngo, attest that this documentation has been prepared under the direction and in the presence of BROOKE Venegas   Electronically signed: Brian Slaughter, 11-25-22 @ 19:53    I, BROOKE Venegas, personally performed the services described in this documentation. all medical record entries made by the karlaibkyle were at my direction and in my presence. I have reviewed the chart and agree that the record reflects my personal performance and is accurate and complete  Electronically signed: BROOKE Veengas

## 2022-11-25 NOTE — PROGRESS NOTE ADULT - ATTENDING COMMENTS
Patient seen and examined this Am on rounds with the patient care team.     Patient admitted with gallstone pancreatitis. Overnight he required non invasive positive pressure ventilation for acute respiratory insufficiency. In the Am he was transitioned to high flow oxygen but oxygen saturation was only 88-89% and he was tachypneic. The patient was placed back on BIpap.   CXR reviewed with XR consistent with development of bilateral infiltrates looking consistent with ARDS. His IVF at that time was decreased to 100 cc/hour from 150 cc/hour and for resuscitation was given 100cc of 25% albumin. I discussed with his daughter at this time that there was a concern that he had worsening respiratory function and that he likely would require intubation if the course continued to progress.   Plan was to continue to keep on non invasive ventilation.    Mr. Marley's troponins continued to rise and close continued discussions with cardiology fellow as well as CCU fellow and attending occurred with no need to cardiac cath at this time and believe that at this time management with heparin drip, aspirin and plavix was to be continued with continued serial lab values monitoring troponins.   TTE performed with moderate segmental left ventricular systolic dysfunction. LVEF calculated using biplane Sequeira's method is 43%. Akinesis of the  mid-basal anterolateral wall, inferior and inferolateral wall with all remaining walls hypokinetic. Moderate diastolic dysfunction (Stage II). Unclear what baseline TTE is given none to compare to in the system[place]     Patient would be kept NPO with IVF resuscitation. Patient spiked a fever and given this blood cultures obtained and started on antibiotics empirically.  Transaminitis worsening but TB improving from admission. Will continue to monitor LFTs closely.   Leukocytosis elevated likely secondary to pancreatitis.     On admission patient had an acute kidney injury with increased creatinine. Will place michel catheter and monitor urine output accordingly.     Throughout the day the patient developed worsening acute respiratory failure as predicted necessitating intubation. Patient was intubated successfully by the anesthesiology team. Shortly after he was difficult to oxygenated and saturation was 70-80s SpO2. Bagging continued and CXR repeated with appropriate placement of ETT and ETCo2. He acute loss his BP and went into PEA arrest at this time likely secondary to hypoxic respiratory failure as there was a large amount of pulmonary edema and fluid noted in the ETT. ACLS was initiated and the patient received multiple rounds of epinephrine, bicarbonate, calcium and magnesium. He received amiodarone 150 mg.  He would intermittently achieve ROSC and then go into PEA arrest. He also had pulseless VT in which he received shocks and continued compressions.   ECMO was considered for acute respiratory failure and ECMo team alerted. ECMO was placed by the CT team. Prior to the ECMO he was on multiple pressor drips of norepinephrine, epinephrine and vasopression and post ECMP placement was weaned off accordingly to MAP 60-65 mmHg.  He was transferred to the CTU.     I explained the clinical management and plan with his daughter.   This patient was critically ill with one or more vital organ systems at a high probability of imminent or life threatening deterioration. Complex decision making was required to assess and treat single or multiple vital organ system failure and/or prevent further life threatening deterioration of the patient's condition.   CC time: 145 minutes

## 2022-11-25 NOTE — CONSULT NOTE ADULT - SUBJECTIVE AND OBJECTIVE BOX
p (1920)     HPI:  Patient is a 63 y/o male with PMH of CABG, DM, HTN, HLD presenting as transfer from [place] for c/f STEMI. PTA arrival received 325 aspirin, 600 plavix, and no heparin drip started. Around 1:30 am patient had multiple episodes of non-bloody diarrhea and emesis with associated abdominal pain and chest pain, unable to localize, non-radiating, with associated SOB and no associated palpitations or diaphoresis. No recent fevers/chills, cough, rashes, or changes in urination. Does report mild diffuse back pain; started 5 days ago in setting on injury while walking and lifting objects. Denies focal weakness, numbness/tingling, or LOC due does report mild dizziness.  Patient seen and examined in ED. Hemodynamically stable, alert and awake, A&Ox3. Daughter at bedside. Reports abdominal pain, and nausea. Abdomen is soft, tender in epigastric area and RUQ. Denies chest pain, SOB. WBC 20, T.bili 3.4, Lipase 300. RUQ US demonstrated gallbladder stones, pericholecystic fluid.     Patient had increasing troponins throughout the day and worsening respiratory status with rising lactate. Cardiology consult saw the patient earlier in the day and in consultation with multiple interventional cardiologists recommended medical management for NSTEMI. The patient later went into cardiac arrest (likely asystolic per history) and ACLS was started. CICU was consulted due to frothy oral secretions during the arrest. The CICU team went to bedside immediately after being consulted and after a brief chart review and seeing the clinical situation recommended a code ECMO be called. The ECMO team then came to the bedside and ultimately cannulated the patient.     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 No mass effect or midline shift. 4 hour repeat CTH grossly stable.     Exam: On propofol, intubated, with propofol held EOS, perrl, COBB spontaneously to command    --Anticoagulation:  heparin  Infusion 500 Unit(s)/Hr IV Continuous <Continuous>    =====================  PAST MEDICAL HISTORY     PAST SURGICAL HISTORY         MEDICATIONS:  Antibiotics:  meropenem  IVPB 1000 milliGRAM(s) IV Intermittent every 12 hours    Neuro:  propofol Infusion 10 MICROgram(s)/kG/Min IV Continuous <Continuous>    Other:  aMIOdarone Infusion 0.25 mG/Min IV Continuous <Continuous>  dextrose 50% Injectable 50 milliLiter(s) IV Push every 15 minutes  EPINEPHrine    Infusion 0.1 MICROgram(s)/kG/Min IV Continuous <Continuous>  hydrocortisone sodium succinate Injectable 100 milliGRAM(s) IV Push every 8 hours  insulin lispro (ADMELOG) corrective regimen sliding scale   SubCutaneous every 6 hours  insulin regular Infusion 6 Unit(s)/Hr IV Continuous <Continuous>  norepinephrine Infusion 0.05 MICROgram(s)/kG/Min IV Continuous <Continuous>  norepinephrine Infusion 0.05 MICROgram(s)/kG/Min IV Continuous <Continuous>  pantoprazole  Injectable 40 milliGRAM(s) IV Push two times a day  sodium chloride 0.9% lock flush 10 milliLiter(s) IV Push every 1 hour PRN  sodium chloride 0.9%. 1000 milliLiter(s) IV Continuous <Continuous>  vasopressin Infusion 0.1 Unit(s)/Min IV Continuous <Continuous>      SOCIAL HISTORY:   Occupation:   Marital Status:     FAMILY HISTORY:      ROS: Negative except per HPI    LABS:  PT/INR - ( 25 Nov 2022 20:04 )   PT: 18.2 sec;   INR: 1.56 ratio         PTT - ( 25 Nov 2022 20:04 )  PTT:87.4 sec                        12.2   20.38 )-----------( 158      ( 25 Nov 2022 20:04 )             36.0     11-25    145  |  97  |  35<H>  ----------------------------<  280<H>  4.4   |  17<L>  |  2.53<H>    Ca    9.7      25 Nov 2022 20:05  Phos  7.9     11-25  Mg     3.0     11-25    TPro  4.7<L>  /  Alb  2.2<L>  /  TBili  2.3<H>  /  DBili  x   /  AST  666<H>  /  ALT  407<H>  /  AlkPhos  80  11-25       p (5680)     HPI:  Patient is a 61 y/o male with PMH of CABG, DM, HTN, HLD presenting as transfer from [place] for c/f STEMI. PTA arrival received 325 aspirin, 600 plavix, and no heparin drip started. Around 1:30 am patient had multiple episodes of non-bloody diarrhea and emesis with associated abdominal pain and chest pain, unable to localize, non-radiating, with associated SOB and no associated palpitations or diaphoresis. No recent fevers/chills, cough, rashes, or changes in urination. Does report mild diffuse back pain; started 5 days ago in setting on injury while walking and lifting objects. Denies focal weakness, numbness/tingling, or LOC due does report mild dizziness.  Patient seen and examined in ED. Hemodynamically stable, alert and awake, A&Ox3. Daughter at bedside. Reports abdominal pain, and nausea. Abdomen is soft, tender in epigastric area and RUQ. Denies chest pain, SOB. WBC 20, T.bili 3.4, Lipase 300. RUQ US demonstrated gallbladder stones, pericholecystic fluid.     Patient had increasing troponins throughout the day and worsening respiratory status with rising lactate. Cardiology consult saw the patient earlier in the day and in consultation with multiple interventional cardiologists recommended medical management for NSTEMI. The patient later went into cardiac arrest (likely asystolic per history) and ACLS was started. CICU was consulted due to frothy oral secretions during the arrest. The CICU team went to bedside immediately after being consulted and after a brief chart review and seeing the clinical situation recommended a code ECMO be called. The ECMO team then came to the bedside and ultimately cannulated the patient.     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 No mass effect or midline shift. 4 hour repeat CTH grossly stable.     Exam: On propofol, intubated, with propofol held EOS, perrl, COBB spontaneously to command    --Anticoagulation:  heparin  Infusion 500 Unit(s)/Hr IV Continuous <Continuous>    =====================  PAST MEDICAL HISTORY     PAST SURGICAL HISTORY         MEDICATIONS:  Antibiotics:  meropenem  IVPB 1000 milliGRAM(s) IV Intermittent every 12 hours    Neuro:  propofol Infusion 10 MICROgram(s)/kG/Min IV Continuous <Continuous>    Other:  aMIOdarone Infusion 0.25 mG/Min IV Continuous <Continuous>  dextrose 50% Injectable 50 milliLiter(s) IV Push every 15 minutes  EPINEPHrine    Infusion 0.1 MICROgram(s)/kG/Min IV Continuous <Continuous>  hydrocortisone sodium succinate Injectable 100 milliGRAM(s) IV Push every 8 hours  insulin lispro (ADMELOG) corrective regimen sliding scale   SubCutaneous every 6 hours  insulin regular Infusion 6 Unit(s)/Hr IV Continuous <Continuous>  norepinephrine Infusion 0.05 MICROgram(s)/kG/Min IV Continuous <Continuous>  norepinephrine Infusion 0.05 MICROgram(s)/kG/Min IV Continuous <Continuous>  pantoprazole  Injectable 40 milliGRAM(s) IV Push two times a day  sodium chloride 0.9% lock flush 10 milliLiter(s) IV Push every 1 hour PRN  sodium chloride 0.9%. 1000 milliLiter(s) IV Continuous <Continuous>  vasopressin Infusion 0.1 Unit(s)/Min IV Continuous <Continuous>      SOCIAL HISTORY:   Occupation:   Marital Status:     FAMILY HISTORY:      ROS: Negative except per HPI    LABS:  PT/INR - ( 25 Nov 2022 20:04 )   PT: 18.2 sec;   INR: 1.56 ratio         PTT - ( 25 Nov 2022 20:04 )  PTT:87.4 sec                        12.2   20.38 )-----------( 158      ( 25 Nov 2022 20:04 )             36.0     11-25    145  |  97  |  35<H>  ----------------------------<  280<H>  4.4   |  17<L>  |  2.53<H>    Ca    9.7      25 Nov 2022 20:05  Phos  7.9     11-25  Mg     3.0     11-25    TPro  4.7<L>  /  Alb  2.2<L>  /  TBili  2.3<H>  /  DBili  x   /  AST  666<H>  /  ALT  407<H>  /  AlkPhos  80  11-25       p (5770)     HPI:  Patient is a 61 y/o male with PMH of CABG, DM, HTN, HLD presenting as transfer from [place] for c/f STEMI. PTA arrival received 325 aspirin, 600 plavix, and no heparin drip started. Around 1:30 am patient had multiple episodes of non-bloody diarrhea and emesis with associated abdominal pain and chest pain, unable to localize, non-radiating, with associated SOB and no associated palpitations or diaphoresis. No recent fevers/chills, cough, rashes, or changes in urination. Does report mild diffuse back pain; started 5 days ago in setting on injury while walking and lifting objects. Denies focal weakness, numbness/tingling, or LOC due does report mild dizziness.  Patient seen and examined in ED. Hemodynamically stable, alert and awake, A&Ox3. Daughter at bedside. Reports abdominal pain, and nausea. Abdomen is soft, tender in epigastric area and RUQ. Denies chest pain, SOB. WBC 20, T.bili 3.4, Lipase 300. RUQ US demonstrated gallbladder stones, pericholecystic fluid.     Patient had increasing troponins throughout the day and worsening respiratory status with rising lactate. Cardiology consult saw the patient earlier in the day and in consultation with multiple interventional cardiologists recommended medical management for NSTEMI. The patient later went into cardiac arrest (likely asystolic per history) and ACLS was started. CICU was consulted due to frothy oral secretions during the arrest. The CICU team went to bedside immediately after being consulted and after a brief chart review and seeing the clinical situation recommended a code ECMO be called. The ECMO team then came to the bedside and ultimately cannulated the patient.     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 No mass effect or midline shift. 4 hour repeat CTH grossly stable.     Exam: On propofol, intubated, with propofol held EOS, perrl, COBB spontaneously to command    --Anticoagulation:  heparin  Infusion 500 Unit(s)/Hr IV Continuous <Continuous>    =====================  PAST MEDICAL HISTORY     PAST SURGICAL HISTORY         MEDICATIONS:  Antibiotics:  meropenem  IVPB 1000 milliGRAM(s) IV Intermittent every 12 hours    Neuro:  propofol Infusion 10 MICROgram(s)/kG/Min IV Continuous <Continuous>    Other:  aMIOdarone Infusion 0.25 mG/Min IV Continuous <Continuous>  dextrose 50% Injectable 50 milliLiter(s) IV Push every 15 minutes  EPINEPHrine    Infusion 0.1 MICROgram(s)/kG/Min IV Continuous <Continuous>  hydrocortisone sodium succinate Injectable 100 milliGRAM(s) IV Push every 8 hours  insulin lispro (ADMELOG) corrective regimen sliding scale   SubCutaneous every 6 hours  insulin regular Infusion 6 Unit(s)/Hr IV Continuous <Continuous>  norepinephrine Infusion 0.05 MICROgram(s)/kG/Min IV Continuous <Continuous>  norepinephrine Infusion 0.05 MICROgram(s)/kG/Min IV Continuous <Continuous>  pantoprazole  Injectable 40 milliGRAM(s) IV Push two times a day  sodium chloride 0.9% lock flush 10 milliLiter(s) IV Push every 1 hour PRN  sodium chloride 0.9%. 1000 milliLiter(s) IV Continuous <Continuous>  vasopressin Infusion 0.1 Unit(s)/Min IV Continuous <Continuous>      SOCIAL HISTORY:   Occupation:   Marital Status:     FAMILY HISTORY:      ROS: Negative except per HPI    LABS:  PT/INR - ( 25 Nov 2022 20:04 )   PT: 18.2 sec;   INR: 1.56 ratio         PTT - ( 25 Nov 2022 20:04 )  PTT:87.4 sec                        12.2   20.38 )-----------( 158      ( 25 Nov 2022 20:04 )             36.0     11-25    145  |  97  |  35<H>  ----------------------------<  280<H>  4.4   |  17<L>  |  2.53<H>    Ca    9.7      25 Nov 2022 20:05  Phos  7.9     11-25  Mg     3.0     11-25    TPro  4.7<L>  /  Alb  2.2<L>  /  TBili  2.3<H>  /  DBili  x   /  AST  666<H>  /  ALT  407<H>  /  AlkPhos  80  11-25

## 2022-11-25 NOTE — PROGRESS NOTE ADULT - ASSESSMENT
This is a 61yo Male with PMHx of CABG x4 in [facility] ~3yrs ago, T2DM, HTN, HLD, who presented initially to [place] ED for one day of abdominal pain, nausea, vomiting. Found to have abnormal EKG and was transferred here for further evaluation. Labs at OSH notable for Lipase of >30,000, WBC 20, Cr 2.27, , , Troponin I 234, Covid-19 negative. Afebrile, HR 110s, BPs 110s/70s.      Recommendations  - EKG in ED shows sinus tach with LVH, STD I, II, V4-V6. Similar to EKG at [place]. No prior baseline for comparison.   - Troponin I 234 at OSH, Troponin  at [facility] 87, 207, 455, 1403, 2445, has not currently peaked, conntinue trending to peak  - TTE 11/25: EF 43%, Moderately dilated left atrium, Moderate segmental left ventricular systolic dysfunction.Akinesis of the  mid-basal anterolateral wall, inferior and inferolateral wall with all remaining walls hypokinetic, Moderate diastolic dysfunction (Stage II).  -  Continue Aspirin 81mg and Plavix, in addition to Heparin drip   - Monitor on Telemetry   - Lipase > 30,000 at OSH. CT Abd Acute interstitial edematous pancreatitis. Primary team concerned for development of ARDS at this time.   - Case discussed with interventional attending Dr. Montes; medical management advised currently. Pt denying chest pain, BP stable, has received recent contrast load during CT.   - Please notify cardiology if patient develops any chest pain, dynamic EKG changes     Saskia Carrillo MD  Cardiology Fellow     Recommendations are preliminary until cosigned by attending        This is a 63yo Male with PMHx of CABG x4 in [facility] ~3yrs ago, T2DM, HTN, HLD, who presented initially to [place] ED for one day of abdominal pain, nausea, vomiting. Found to have abnormal EKG and was transferred here for further evaluation. Labs at OSH notable for Lipase of >30,000, WBC 20, Cr 2.27, , , Troponin I 234, Covid-19 negative. Afebrile, HR 110s, BPs 110s/70s.      Recommendations  - EKG in ED shows sinus tach with LVH, STD I, II, V4-V6. Similar to EKG at [place]. No prior baseline for comparison.   - Troponin I 234 at OSH, Troponin  at [facility] 87, 207, 455, 1403, 2445, has not currently peaked, conntinue trending to peak  - TTE 11/25: EF 43%, Moderately dilated left atrium, Moderate segmental left ventricular systolic dysfunction.Akinesis of the  mid-basal anterolateral wall, inferior and inferolateral wall with all remaining walls hypokinetic, Moderate diastolic dysfunction (Stage II).  -  Continue Aspirin 81mg and Plavix, in addition to Heparin drip   - Monitor on Telemetry   - Lipase > 30,000 at OSH. CT Abd Acute interstitial edematous pancreatitis. Primary team concerned for development of ARDS at this time.   - Case discussed with interventional attending Dr. Montes; medical management advised currently. Pt denying chest pain, BP stable, has received recent contrast load during CT.   - Please notify cardiology if patient develops any chest pain, dynamic EKG changes     Saskia Carrillo MD  Cardiology Fellow     Recommendations are preliminary until cosigned by attending        This is a 61yo Male with PMHx of CABG x4 in [facility] ~3yrs ago, T2DM, HTN, HLD, who presented initially to [place] ED for one day of abdominal pain, nausea, vomiting. Found to have abnormal EKG and was transferred here for further evaluation. Labs at OSH notable for Lipase of >30,000, WBC 20, Cr 2.27, , , Troponin I 234, Covid-19 negative. Afebrile, HR 110s, BPs 110s/70s.      Recommendations  - EKG in ED shows sinus tach with LVH, STD I, II, V4-V6. Similar to EKG at [place]. No prior baseline for comparison.   - Troponin I 234 at OSH, Troponin  at [facility] 87, 207, 455, 1403, 2445, has not currently peaked, conntinue trending to peak  - TTE 11/25: EF 43%, Moderately dilated left atrium, Moderate segmental left ventricular systolic dysfunction.Akinesis of the  mid-basal anterolateral wall, inferior and inferolateral wall with all remaining walls hypokinetic, Moderate diastolic dysfunction (Stage II).  -  Continue Aspirin 81mg and Plavix, in addition to Heparin drip   - Monitor on Telemetry   - Lipase > 30,000 at OSH. CT Abd Acute interstitial edematous pancreatitis. Primary team concerned for development of ARDS at this time.   - Case discussed with interventional attending Dr. Montes; medical management advised currently. Pt denying chest pain, BP stable, has received recent contrast load during CT.   - Please notify cardiology if patient develops any chest pain, dynamic EKG changes     Saskia Carrillo MD  Cardiology Fellow     Recommendations are preliminary until cosigned by attending

## 2022-11-25 NOTE — PROGRESS NOTE ADULT - ASSESSMENT
Patient is a 61 y/o male with PMH of CABG, DM, HTN, HLD presenting as transfer from [place] with initial c/f NSTEMI, given ASA and Plavix and transferred here. At [facility] found to have acute pancreatitis 2/2 gallstones, moderate on CT. SICU consulted due to increased troponins and concern for ACS.    PLAN:   Neurologic:  - A&Ox3  - Pain control with tylenol, IVP dilaudid as needed    Respiratory:  - no increased WOB    Cardiovascular: demand ischemia vs. ACS  - Initial troponin here 207, most recently 455  - Trend troponins q6h  - Lactate 2.7, trend  - No heparin for now as risks would likely outweigh benefits  - TTE in AM    Gastrointestinal/Nutrition: gallstone pancreatitis  - NPO    Genitourinary/Renal:  - Monitor I&Os  - LR @150cc/hr    Hematologic:  - DVT ppx    Infectious Disease:  - WBC 20, likely inflammatory response from pancreatitis    Endocrine:  - H/o DM, FS and ISS    Disposition: SICU   Patient is a 61 y/o male with PMH of CABG, DM, HTN, HLD presenting as transfer from [place] with initial c/f NSTEMI, given ASA and Plavix and transferred here. At [facility] found to have acute pancreatitis 2/2 gallstones, moderate on CT. SICU consulted due to increased troponins and concern for ACS.    PLAN:   Neurologic:  - A&Ox3  - Pain control with tylenol, IVP dilaudid as needed    Respiratory:  - no increased WOB    Cardiovascular: demand ischemia vs. ACS  - Initial troponin here 207, most recently 455  - Trend troponins q6h  - Lactate 2.7, trend  - No heparin for now as risks would likely outweigh benefits  - TTE in AM    Gastrointestinal/Nutrition: gallstone pancreatitis  - NPO    Genitourinary/Renal:  - Monitor I&Os  - LR @150cc/hr    Hematologic:  - DVT ppx    Infectious Disease:  - WBC 20, likely inflammatory response from pancreatitis    Endocrine:  - H/o DM, FS and ISS    Disposition: SICU   Patient is a 63 y/o male with PMH of CABG, DM, HTN, HLD presenting as transfer from [place] with initial c/f NSTEMI, given ASA and Plavix and transferred here. At [facility] found to have acute pancreatitis 2/2 gallstones, moderate on CT. SICU consulted due to increased troponins and concern for ACS.    PLAN:   Neurologic:  - A&Ox3  - Pain control with tylenol, IVP dilaudid as needed    Respiratory:  - no increased WOB    Cardiovascular: demand ischemia vs. ACS  - Initial troponin here 207, most recently 455  - Trend troponins q6h  - Lactate 2.7, trend  - No heparin for now as risks would likely outweigh benefits  - TTE in AM    Gastrointestinal/Nutrition: gallstone pancreatitis  - NPO    Genitourinary/Renal:  - Monitor I&Os  - LR @150cc/hr    Hematologic:  - DVT ppx    Infectious Disease:  - WBC 20, likely inflammatory response from pancreatitis    Endocrine:  - H/o DM, FS and ISS    Disposition: SICU

## 2022-11-25 NOTE — CONSULT NOTE ADULT - ASSESSMENT
62M PMHx CABG, DM/HTN/HLD p/w STEMI, asa/plavix load yest now s/p ARDS, cardiac arrest, ECMO in CTU. CTH 4mm tentorial SDH. Intubated, perrl, FC x 4 w/ sedation held   -No nsgy intervention indicated at this time  -4hr Repeat CTH some redistribution along tentorium however grossly stable, recommend repeat CTH in am in setting of AC  -Can obtain P2Y12, TEG to assess plt function     -Last , hep currently off but will need AC for ECMO    While there is no absolute neurosurgical contraindication to systemic anti coagulation, there does exist an increased risk of intracranial hemorrhage which can result in significant morbidity including but not limited to headache, seizure, stroke, paralysis, and in severe cases even death.    The risks and benefits of starting systemic anticoagulation must be considered carefully in the setting of the patients medical history and current clinical condition.    If AC is started...  - Recommend NO bolus with low ptt goal of <65  - Recommend obtaining follow up CTH after patient is therapeutic and to repeat CTH for any changes in the patients neurologic exam

## 2022-11-25 NOTE — PROGRESS NOTE ADULT - SUBJECTIVE AND OBJECTIVE BOX
24 HOUR EVENTS:  - Admitted to SICU    NEURO  RASS (if intubated): N/a		CAM ICU (if concern for delirium):N/a  Exam: A&Ox3  Meds: acetaminophen   IVPB .. 1000 milliGRAM(s) IV Intermittent every 6 hours  HYDROmorphone  Injectable 0.5 milliGRAM(s) IV Push every 4 hours PRN Pain      RESPIRATORY  RR: 24 (11-25-22 @ 01:00) (20 - 32)  SpO2: 95% (11-25-22 @ 01:00) (92% - 100%)  Wt(kg): --  Exam: no increased WOB  Mechanical Ventilation: none    Meds:     CARDIOVASCULAR  HR: 96 (11-25-22 @ 01:00) (96 - 121)  BP: 112/61 (11-25-22 @ 01:00) (98/55 - 135/74)  BP(mean): 81 (11-25-22 @ 01:00) (69 - 98)  ABP: --  ABP(mean): --  Wt(kg): --  CVP(cm H2O): --  VBG - ( 24 Nov 2022 07:55 )  pH: 7.31  /  pCO2: 48    /  pO2: 23    / HCO3: 24    / Base Excess: -2.6  /  SaO2: 28.1   Lactate: 2.7                Exam:   Cardiac Rhythm: sinus  Perfusion     [ X]Adequate   [ ]Inadequate  Mentation   [ X]Normal       [ ]Reduced  Extremities  [X ]Warm         [ ]Cool  Volume Status [ ]Hypervolemic [ X]Euvolemic [ ]Hypovolemic  Meds: carvedilol 6.25 milliGRAM(s) Oral every 12 hours      GI/NUTRITION  Exam: soft, nondistended, mildly TTP in epigastrium. No guarding or rebound  Diet: CLD  Meds: pantoprazole  Injectable 40 milliGRAM(s) IV Push two times a day      GENITOURINARY  I&O's Detail    11-24 @ 07:01  -  11-25 @ 01:38  --------------------------------------------------------  IN:    IV PiggyBack: 100 mL    Lactated Ringers: 900 mL  Total IN: 1000 mL    OUT:    Voided (mL): 375 mL  Total OUT: 375 mL    Total NET: 625 mL        Weight (kg): 92.9 (11-24 @ 07:48)  11-24    133<L>  |  98  |  29<H>  ----------------------------<  270<H>  4.3   |  21<L>  |  2.05<H>    Ca    8.1<L>      24 Nov 2022 11:31  Mg     1.8     11-24    TPro  8.0  /  Alb  4.0  /  TBili  3.4<H>  /  DBili  x   /  AST  175<H>  /  ALT  115<H>  /  AlkPhos  117  11-24    Meds: dextrose 5%. 1000 milliLiter(s) IV Continuous <Continuous>  dextrose 5%. 1000 milliLiter(s) IV Continuous <Continuous>  lactated ringers. 1000 milliLiter(s) IV Continuous <Continuous>      HEMATOLOGIC  Meds: aspirin  chewable 81 milliGRAM(s) Oral daily  clopidogrel Tablet 75 milliGRAM(s) Oral daily                          15.0   20.02 )-----------( 218      ( 24 Nov 2022 07:57 )             46.8     PT/INR - ( 24 Nov 2022 07:57 )   PT: 12.0 sec;   INR: 1.04 ratio         PTT - ( 24 Nov 2022 07:57 )  PTT:25.0 sec    INFECTIOUS DISEASES  T(C): 37.6 (11-24-22 @ 23:00), Max: 38 (11-24-22 @ 19:15)  Wt(kg): --  WBC Count: 20.02 K/uL (11-24 @ 07:57)    Recent Cultures:    Meds: influenza   Vaccine 0.5 milliLiter(s) IntraMuscular once      ENDOCRINE  Capillary Blood Glucose    Meds: dextrose 50% Injectable 25 Gram(s) IV Push once  dextrose 50% Injectable 12.5 Gram(s) IV Push once  dextrose 50% Injectable 25 Gram(s) IV Push once  dextrose Oral Gel 15 Gram(s) Oral once PRN  glucagon  Injectable 1 milliGRAM(s) IntraMuscular once  insulin lispro (ADMELOG) corrective regimen sliding scale   SubCutaneous three times a day before meals  insulin lispro (ADMELOG) corrective regimen sliding scale   SubCutaneous at bedtime      ACCESS DEVICES:  [ X] Peripheral IV  [ ] Central Venous Line		[ ] R	[ ] L	[ ] IJ	[ ] Fem	[ ] SC	Placed:   [ ] Arterial Line			[ ] R	[ ] L	[ ] Fem	[ ] Rad	[ ] Ax	Placed:   [ ] PICC:					[ ] Mediport  [ ] Urinary Catheter, Date Placed:   [ X] Necessity of urinary, arterial, and venous catheters discussed    OTHER MEDICATIONS:      IMAGING:

## 2022-11-25 NOTE — CONSULT NOTE ADULT - ASSESSMENT
63yo Male with PMHx of CABG x4  ~3yrs ago, T2DM, HTN, HLD, who presented initially to OSH for one day of abdominal pain, nausea, vomiting. Found to have acute cholecystitis and gall stone pancreatitis. Also with abnormal EKG, elevated hs trops and was transferred here for further evaluation.  61yo Male with PMHx of CABG x4  ~3yrs ago, T2DM, HTN, HLD, who presented initially to OSH for one day of abdominal pain, nausea, vomiting. Found to have acute cholecystitis and gall stone pancreatitis. Also with abnormal EKG, elevated hs trops and was transferred here for further evaluation.

## 2022-11-25 NOTE — CONSULT NOTE ADULT - ASSESSMENT
The patient has had worsening cardiac enzymes throughout the day and ultimately had a cardiac arrest. That having been said, I do not think this was due to a primary cardiac event/ACS/plaque rupture. I think the patient had gallstone pancreatitis that eventually led to him having ARDS and septic shock which were the driving factors in his decompensation and cardiac arrest, which sounds like it was a non-VT/VF arrest and likely respiratory in etiology. It is not unreasonable that, in the setting of becoming more critically ill and have a higher metabolic demand, the patient would also have elevated troponins considering that he most certainly has underlying coronary disease (he has had a prior CABG) and had an acute kidney injury, meaning his GFR was effectively zero and he could not effectively clear enzymes. His EKG also had LVH so the injury current found on it could also be due to repolarization abnormalities. If there is any doubt about the etiology of the patient's pulmonary edema then a PA catheter could be placed to determine the filling pressures. Regardless, the patient is now on ECMO and will go to the CTU. I will defer to their management.

## 2022-11-25 NOTE — CHART NOTE - NSCHARTNOTEFT_GEN_A_CORE
CARDIAC SURGERY CHART NOTE  EDUARDO REAL | [id number] | 11-25-22 @ 16:15     Cardiac surgery on call / in-house team responded to overhead CODE ECMO initiation.     Upon arrival in SICU, patient was found to be coding undergoing chest compressions / CPR with SICU team bedside.   Cardiac surgeon SMITH Rodrigues MD discussed case bedside with attending trauma surgeon CHRIS Clements MD with decision to place patient on emergent ECMO (eCPR) bedside.     In brief: 62M hx htn, hld, dm, CABG 3y prior p/w acute pancreatitis c/b likely demand myocardial ischemia and now combined hypoxic / hypercapnic respiratory failure secondary to ARDS. Patient was intubated and had a bradycardic / hypoxic arrest requiring CPR.  ECMO team present at bedside with approx. 30 mins of monitored CPR performed and decision was made by attending cardiac and trauma surgeon bedside to place patient on ECMO emergently.     Patient underwent b/l ultrasound guided groin cannulation with 19Fr [employer] arterial cannula in R. CFA and 25Fr [employer] venous cannula in L CFV. (See operative note).  Patient underwent CT H/C/A/P and was transferred to CTICU for ongoing management.     CPR intiated (per report) at approximately 15:45.  Code ECMO activated: 16:10.   ECMO decision made at approximately 16:15-16:20  ECMO initiated at 16:40.       62M p/w acute pancreatitis now c/b ards with combined hypoxic/hypercapnic respiratory failure with hypoxic cardiac arrest now s/p eCPR / ECMO initiation.   Plan was discussed with attending surgeon SMITH Rodrigues bedside and patient was placed on ECMO by attending surgeon bedside.     RICHA Diaz MD, PGY-V  Department of Cardiovascular and Thoracic Surgery  [facility] CARDIAC SURGERY CHART NOTE  EDUARDO REAL | [id number] | 11-25-22 @ 16:15     Cardiac surgery on call / in-house team responded to overhead CODE ECMO initiation.     Upon arrival in SICU, patient was found to be coding undergoing chest compressions / CPR with SICU team bedside.   Cardiac surgeon SMITH Rodrigues MD discussed case bedside with attending trauma surgeon CHRIS Clements MD with decision to place patient on emergent ECMO (eCPR) bedside.     In brief: 62M hx htn, hld, dm, CABG 3y prior p/w acute pancreatitis c/b likely demand myocardial ischemia and now combined hypoxic / hypercapnic respiratory failure secondary to ARDS. Patient was intubated and had a bradycardic / hypoxic arrest requiring CPR.  ECMO team present at bedside with approx. 30 mins of monitored CPR performed and decision was made by attending cardiac and trauma surgeon bedside to place patient on ECMO emergently.     Patient underwent b/l ultrasound guided groin cannulation with 19Fr [employer] arterial cannula in R. CFA and 25Fr [employer] venous cannula in L CFV. (See operative note).  Patient underwent CT H/C/A/P and was transferred to CTICU for ongoing management.     CPR intiated (per report) at approximately 15:45.  Code ECMO activated: 16:10.   ECMO decision made at approximately 16:15-16:20  ECMO initiated at 16:40.       62M p/w acute pancreatitis now c/b ards with combined hypoxic/hypercapnic respiratory failure with hypoxic cardiac arrest now s/p eCPR / ECMO initiation.   Plan was discussed with attending surgeon SMITH Rodrigues bedside and patient was placed on ECMO by attending surgeon bedside.     RICHA Diaz MD, PGY-V  Department of Cardiovascular and Thoracic Surgery  [facility] CARDIAC SURGERY CHART NOTE  EDUARDO REAL | [id number] | 11-25-22 @ 16:15     Cardiac surgery on call / in-house team responded to overhead CODE ECMO initiation.     Upon arrival in SICU, patient was found to be coding undergoing chest compressions / CPR with SICU team bedside.   Cardiac surgeon SMITH Rodrigues MD discussed case bedside with attending trauma surgeon CHRIS Clements MD with decision to place patient on emergent ECMO (eCPR) bedside.     In brief: 62M hx htn, hld, dm, CABG 3y prior p/w acute pancreatitis c/b likely demand myocardial ischemia and now combined hypoxic / hypercapnic respiratory failure secondary to ARDS. Patient was intubated and had a bradycardic / hypoxic arrest requiring CPR.  ECMO team present at bedside with approx. 30 mins of monitored CPR performed and decision was made by attending cardiac and trauma surgeon bedside to place patient on ECMO emergently.     Patient underwent b/l ultrasound guided groin cannulation with 19Fr [employer] arterial cannula in R. CFA and 25Fr [employer] venous cannula in L CFV. (See operative note).  Patient underwent CT H/C/A/P and was transferred to CTICU for ongoing management.     CPR intiated (per report) at approximately 15:45.  Code ECMO activated: 16:10.   ECMO decision made at approximately 16:15-16:20  ECMO initiated at 16:40.       62M p/w acute pancreatitis now c/b ards with combined hypoxic/hypercapnic respiratory failure with hypoxic cardiac arrest now s/p eCPR / ECMO initiation.   Plan was discussed with attending surgeon SMITH Rodrigues bedside and patient was placed on ECMO by attending surgeon bedside.     RICHA Diaz MD, PGY-V  Department of Cardiovascular and Thoracic Surgery  [facility]

## 2022-11-25 NOTE — CONSULT NOTE ADULT - SUBJECTIVE AND OBJECTIVE BOX
EDUARDO REAL  MRN-[id number]  Patient is a 62y old  Male who presents with a chief complaint of Acute cholecystitis, gallstone pancreatitis (2022 11:24)    HPI:  Patient is a 63 y/o male with PMH of CABG, DM, HTN, HLD presenting as transfer from [place] for c/f STEMI. PTA arrival received 325 aspirin, 600 plavix, and no heparin drip started. Around 1:30 am patient had multiple episodes of non-bloody diarrhea and emesis with associated abdominal pain and chest pain, unable to localize, non-radiating, with associated SOB and no associated palpitations or diaphoresis. No recent fevers/chills, cough, rashes, or changes in urination. Does report mild diffuse back pain; started 5 days ago in setting on injury while walking and lifting objects. Denies focal weakness, numbness/tingling, or LOC due does report mild dizziness.    Patient seen and examined in ED. Hemodynamically stable, alert and awake, A&Ox3. Daughter at bedside. Reports abdominal pain, and nausea. Abdomen is soft, tender in epigastric area and RUQ. Denies chest pain, SOB. WBC 20, T.bili 3.4, Lipase 300. RUQ US demonstrated gallbladder stones, pericholecystic fluid.  (2022 15:10)    24 HOUR EVENTS:    Patient had increasing troponins throughout the day and worsening respiratory status with rising lactate. Cardiology consult saw the patient earlier in the day and in consultation with multiple interventional cardiologists recommended medical management for NSTEMI. The patient later went into cardiac arrest (likely asystolic per history) and ACLS was started. CICU was consulted due to frothy oral secretions during the arrest. The CICU team went to bedside immediately after being consulted and after a brief chart review and seeing the clinical situation recommended a code ECMO be called. The ECMO team then came to the bedside and ultimately cannulated the patient.     REVIEW OF SYSTEMS:    Unable to obtain due to patient being intubated    ICU Vital Signs Last 24 Hrs  T(C): 38.7 (2022 11:00), Max: 38.7 (2022 09:00)  T(F): 101.7 (2022 11:00), Max: 101.7 (2022 09:00)  HR: 109 (2022 14:45) (77 - 121)  BP: 104/61 (2022 12:30) (87/52 - 135/74)  BP(mean): 76 (2022 12:30) (64 - 98)  ABP: 140/65 (2022 14:45) (83/40 - 157/60)  ABP(mean): 89 (2022 14:45) (53 - 89)  RR: 44 (2022 14:45) (20 - 44)  SpO2: 93% (2022 14:45) (91% - 100%)    O2 Parameters below as of 2022 11:00  Patient On (Oxygen Delivery Method): BiPAP/CPAP    O2 Concentration (%): 60    I&O's Summary    2022 07:  -  2022 07:00  --------------------------------------------------------  IN: 2070 mL / OUT: 675 mL / NET: 1395 mL    2022 07:01  -  2022 17:52  --------------------------------------------------------  IN: 1182.7 mL / OUT: 925 mL / NET: 257.7 mL    CAPILLARY BLOOD GLUCOSE    POCT Blood Glucose.: 170 mg/dL (2022 09:41)      PHYSICAL EXAM:  Exam was deferred as patient was being resuscitated and cannulated with ECMO    ============================I/O===========================   I&O's Detail    2022 07:01  -  2022 07:00  --------------------------------------------------------  IN:    Heparin Infusion: 20 mL    IV PiggyBack: 250 mL    Lactated Ringers: 1800 mL  Total IN: 2070 mL    OUT:    Voided (mL): 675 mL  Total OUT: 675 mL    Total NET: 1395 mL      2022 07:01  -  2022 17:52  --------------------------------------------------------  IN:    Heparin: 24 mL    Heparin Infusion: 50 mL    IV PiggyBack: 200 mL    IV PiggyBack: 200 mL    Lactated Ringers: 700 mL    Phenylephrine: 8.7 mL  Total IN: 1182.7 mL    OUT:    Indwelling Catheter - Urethral (mL): 450 mL    Norepinephrine: 0 mL    Voided (mL): 475 mL  Total OUT: 925 mL    Total NET: 257.7 mL        ============================ LABS =========================                        9.9    19.42 )-----------( 141      ( 2022 16:11 )             29.6         152<H>  |  104  |  27<H>  ----------------------------<  232<H>  4.3   |  38<H>  |  1.62<H>    Ca    9.8      2022 16:11  Phos  3.8       Mg     2.0         TPro  4.9<L>  /  Alb  2.4<L>  /  TBili  0.8  /  DBili  x   /  AST  167<H>  /  ALT  60<H>  /  AlkPhos  62      Troponin T, High Sensitivity Result: 3464 ng/L (22 @ 16:11)  Troponin T, High Sensitivity Result: 3425 ng/L (22 @ 11:53)  Troponin T, High Sensitivity Result: 2445 ng/L (22 @ 03:59)  Troponin T, High Sensitivity Result: 1403 ng/L (22 @ 21:28)  Troponin T, High Sensitivity Result: 455 ng/L (22 @ 15:18)  Troponin T, High Sensitivity Result: 207 ng/L (22 @ 11:31)  Troponin T, High Sensitivity Result: 87 ng/L (22 @ 07:57)    CKMB Units: 113.4 ng/mL (22 @ 11:53)  CKMB Units: 9.5 ng/mL (22 @ 07:57)    Creatine Kinase, Serum: 1598 U/L (22 @ 11:53)  Creatine Kinase, Serum: 224 U/L (22 @ 07:57)    CPK Mass Assay %: 7.1 % (22 @ 11:53)  CPK Mass Assay %: 4.2 % (22 @ 07:57)        LIVER FUNCTIONS - ( 2022 16:11 )  Alb: 2.4 g/dL / Pro: 4.9 g/dL / ALK PHOS: 62 U/L / ALT: 60 U/L / AST: 167 U/L / GGT: x           PT/INR - ( 2022 07:57 )   PT: 12.0 sec;   INR: 1.04 ratio         PTT - ( 2022 11:53 )  PTT:44.1 sec  ABG - ( 2022 17:32 )  pH, Arterial: 7.28  pH, Blood: x     /  pCO2: 42    /  pO2: 424   / HCO3: 20    / Base Excess: -6.7  /  SaO2: 97.7              Blood Gas Arterial, Lactate: >16.0 mmol/L (22 @ 17:32)  Blood Gas Arterial, Lactate: 15.1 mmol/L (22 @ 17:00)  Blood Gas Arterial, Lactate: >16.0 mmol/L (22 @ 16:47)  Blood Gas Arterial, Lactate: 5.5 mmol/L (22 @ 16:01)  Blood Gas Arterial, Lactate: 1.4 mmol/L (22 @ 15:00)  Blood Gas Arterial, Lactate: 1.4 mmol/L (22 @ 11:49)  Blood Gas Arterial, Lactate: 1.2 mmol/L (22 @ 09:40)  Blood Gas Arterial, Lactate: 1.2 mmol/L (22 @ 08:35)  Blood Gas Venous - Lactate: 2.7 mmol/L (22 @ 07:55)    Urinalysis Basic - ( 2022 10:37 )    Color: Yellow / Appearance: Slightly Turbid / S.027 / pH: x  Gluc: x / Ketone: Trace  / Bili: Negative / Urobili: Negative   Blood: x / Protein: 30 mg/dL / Nitrite: Negative   Leuk Esterase: Negative / RBC: 7 /hpf / WBC 7 /HPF   Sq Epi: x / Non Sq Epi: 2 /hpf / Bacteria: Negative EDUARDO REAL  MRN-[id number]  Patient is a 62y old  Male who presents with a chief complaint of Acute cholecystitis, gallstone pancreatitis (2022 11:24)    HPI:  Patient is a 63 y/o male with PMH of CABG, DM, HTN, HLD presenting as transfer from [place] for c/f STEMI. PTA arrival received 325 aspirin, 600 plavix, and no heparin drip started. Around 1:30 am patient had multiple episodes of non-bloody diarrhea and emesis with associated abdominal pain and chest pain, unable to localize, non-radiating, with associated SOB and no associated palpitations or diaphoresis. No recent fevers/chills, cough, rashes, or changes in urination. Does report mild diffuse back pain; started 5 days ago in setting on injury while walking and lifting objects. Denies focal weakness, numbness/tingling, or LOC due does report mild dizziness.    Patient seen and examined in ED. Hemodynamically stable, alert and awake, A&Ox3. Daughter at bedside. Reports abdominal pain, and nausea. Abdomen is soft, tender in epigastric area and RUQ. Denies chest pain, SOB. WBC 20, T.bili 3.4, Lipase 300. RUQ US demonstrated gallbladder stones, pericholecystic fluid.  (2022 15:10)    24 HOUR EVENTS:    Patient had increasing troponins throughout the day and worsening respiratory status with rising lactate. Cardiology consult saw the patient earlier in the day and in consultation with multiple interventional cardiologists recommended medical management for NSTEMI. The patient later went into cardiac arrest (likely asystolic per history) and ACLS was started. CICU was consulted due to frothy oral secretions during the arrest. The CICU team went to bedside immediately after being consulted and after a brief chart review and seeing the clinical situation recommended a code ECMO be called. The ECMO team then came to the bedside and ultimately cannulated the patient.     REVIEW OF SYSTEMS:    Unable to obtain due to patient being intubated    ICU Vital Signs Last 24 Hrs  T(C): 38.7 (2022 11:00), Max: 38.7 (2022 09:00)  T(F): 101.7 (2022 11:00), Max: 101.7 (2022 09:00)  HR: 109 (2022 14:45) (77 - 121)  BP: 104/61 (2022 12:30) (87/52 - 135/74)  BP(mean): 76 (2022 12:30) (64 - 98)  ABP: 140/65 (2022 14:45) (83/40 - 157/60)  ABP(mean): 89 (2022 14:45) (53 - 89)  RR: 44 (2022 14:45) (20 - 44)  SpO2: 93% (2022 14:45) (91% - 100%)    O2 Parameters below as of 2022 11:00  Patient On (Oxygen Delivery Method): BiPAP/CPAP    O2 Concentration (%): 60    I&O's Summary    2022 07:  -  2022 07:00  --------------------------------------------------------  IN: 2070 mL / OUT: 675 mL / NET: 1395 mL    2022 07:01  -  2022 17:52  --------------------------------------------------------  IN: 1182.7 mL / OUT: 925 mL / NET: 257.7 mL    CAPILLARY BLOOD GLUCOSE    POCT Blood Glucose.: 170 mg/dL (2022 09:41)      PHYSICAL EXAM:  Exam was deferred as patient was being resuscitated and cannulated with ECMO    ============================I/O===========================   I&O's Detail    2022 07:01  -  2022 07:00  --------------------------------------------------------  IN:    Heparin Infusion: 20 mL    IV PiggyBack: 250 mL    Lactated Ringers: 1800 mL  Total IN: 2070 mL    OUT:    Voided (mL): 675 mL  Total OUT: 675 mL    Total NET: 1395 mL      2022 07:01  -  2022 17:52  --------------------------------------------------------  IN:    Heparin: 24 mL    Heparin Infusion: 50 mL    IV PiggyBack: 200 mL    IV PiggyBack: 200 mL    Lactated Ringers: 700 mL    Phenylephrine: 8.7 mL  Total IN: 1182.7 mL    OUT:    Indwelling Catheter - Urethral (mL): 450 mL    Norepinephrine: 0 mL    Voided (mL): 475 mL  Total OUT: 925 mL    Total NET: 257.7 mL        ============================ LABS =========================                        9.9    19.42 )-----------( 141      ( 2022 16:11 )             29.6         152<H>  |  104  |  27<H>  ----------------------------<  232<H>  4.3   |  38<H>  |  1.62<H>    Ca    9.8      2022 16:11  Phos  3.8       Mg     2.0         TPro  4.9<L>  /  Alb  2.4<L>  /  TBili  0.8  /  DBili  x   /  AST  167<H>  /  ALT  60<H>  /  AlkPhos  62      Troponin T, High Sensitivity Result: 3464 ng/L (22 @ 16:11)  Troponin T, High Sensitivity Result: 3425 ng/L (22 @ 11:53)  Troponin T, High Sensitivity Result: 2445 ng/L (22 @ 03:59)  Troponin T, High Sensitivity Result: 1403 ng/L (22 @ 21:28)  Troponin T, High Sensitivity Result: 455 ng/L (22 @ 15:18)  Troponin T, High Sensitivity Result: 207 ng/L (22 @ 11:31)  Troponin T, High Sensitivity Result: 87 ng/L (22 @ 07:57)    CKMB Units: 113.4 ng/mL (22 @ 11:53)  CKMB Units: 9.5 ng/mL (22 @ 07:57)    Creatine Kinase, Serum: 1598 U/L (22 @ 11:53)  Creatine Kinase, Serum: 224 U/L (22 @ 07:57)    CPK Mass Assay %: 7.1 % (22 @ 11:53)  CPK Mass Assay %: 4.2 % (22 @ 07:57)        LIVER FUNCTIONS - ( 2022 16:11 )  Alb: 2.4 g/dL / Pro: 4.9 g/dL / ALK PHOS: 62 U/L / ALT: 60 U/L / AST: 167 U/L / GGT: x           PT/INR - ( 2022 07:57 )   PT: 12.0 sec;   INR: 1.04 ratio         PTT - ( 2022 11:53 )  PTT:44.1 sec  ABG - ( 2022 17:32 )  pH, Arterial: 7.28  pH, Blood: x     /  pCO2: 42    /  pO2: 424   / HCO3: 20    / Base Excess: -6.7  /  SaO2: 97.7              Blood Gas Arterial, Lactate: >16.0 mmol/L (22 @ 17:32)  Blood Gas Arterial, Lactate: 15.1 mmol/L (22 @ 17:00)  Blood Gas Arterial, Lactate: >16.0 mmol/L (22 @ 16:47)  Blood Gas Arterial, Lactate: 5.5 mmol/L (22 @ 16:01)  Blood Gas Arterial, Lactate: 1.4 mmol/L (22 @ 15:00)  Blood Gas Arterial, Lactate: 1.4 mmol/L (22 @ 11:49)  Blood Gas Arterial, Lactate: 1.2 mmol/L (22 @ 09:40)  Blood Gas Arterial, Lactate: 1.2 mmol/L (22 @ 08:35)  Blood Gas Venous - Lactate: 2.7 mmol/L (22 @ 07:55)    Urinalysis Basic - ( 2022 10:37 )    Color: Yellow / Appearance: Slightly Turbid / S.027 / pH: x  Gluc: x / Ketone: Trace  / Bili: Negative / Urobili: Negative   Blood: x / Protein: 30 mg/dL / Nitrite: Negative   Leuk Esterase: Negative / RBC: 7 /hpf / WBC 7 /HPF   Sq Epi: x / Non Sq Epi: 2 /hpf / Bacteria: Negative EDUARDO REAL  MRN-[id number]  Patient is a 62y old  Male who presents with a chief complaint of Acute cholecystitis, gallstone pancreatitis (2022 11:24)    HPI:  Patient is a 63 y/o male with PMH of CABG, DM, HTN, HLD presenting as transfer from [place] for c/f STEMI. PTA arrival received 325 aspirin, 600 plavix, and no heparin drip started. Around 1:30 am patient had multiple episodes of non-bloody diarrhea and emesis with associated abdominal pain and chest pain, unable to localize, non-radiating, with associated SOB and no associated palpitations or diaphoresis. No recent fevers/chills, cough, rashes, or changes in urination. Does report mild diffuse back pain; started 5 days ago in setting on injury while walking and lifting objects. Denies focal weakness, numbness/tingling, or LOC due does report mild dizziness.    Patient seen and examined in ED. Hemodynamically stable, alert and awake, A&Ox3. Daughter at bedside. Reports abdominal pain, and nausea. Abdomen is soft, tender in epigastric area and RUQ. Denies chest pain, SOB. WBC 20, T.bili 3.4, Lipase 300. RUQ US demonstrated gallbladder stones, pericholecystic fluid.  (2022 15:10)    24 HOUR EVENTS:    Patient had increasing troponins throughout the day and worsening respiratory status with rising lactate. Cardiology consult saw the patient earlier in the day and in consultation with multiple interventional cardiologists recommended medical management for NSTEMI. The patient later went into cardiac arrest (likely asystolic per history) and ACLS was started. CICU was consulted due to frothy oral secretions during the arrest. The CICU team went to bedside immediately after being consulted and after a brief chart review and seeing the clinical situation recommended a code ECMO be called. The ECMO team then came to the bedside and ultimately cannulated the patient.     REVIEW OF SYSTEMS:    Unable to obtain due to patient being intubated    ICU Vital Signs Last 24 Hrs  T(C): 38.7 (2022 11:00), Max: 38.7 (2022 09:00)  T(F): 101.7 (2022 11:00), Max: 101.7 (2022 09:00)  HR: 109 (2022 14:45) (77 - 121)  BP: 104/61 (2022 12:30) (87/52 - 135/74)  BP(mean): 76 (2022 12:30) (64 - 98)  ABP: 140/65 (2022 14:45) (83/40 - 157/60)  ABP(mean): 89 (2022 14:45) (53 - 89)  RR: 44 (2022 14:45) (20 - 44)  SpO2: 93% (2022 14:45) (91% - 100%)    O2 Parameters below as of 2022 11:00  Patient On (Oxygen Delivery Method): BiPAP/CPAP    O2 Concentration (%): 60    I&O's Summary    2022 07:  -  2022 07:00  --------------------------------------------------------  IN: 2070 mL / OUT: 675 mL / NET: 1395 mL    2022 07:01  -  2022 17:52  --------------------------------------------------------  IN: 1182.7 mL / OUT: 925 mL / NET: 257.7 mL    CAPILLARY BLOOD GLUCOSE    POCT Blood Glucose.: 170 mg/dL (2022 09:41)      PHYSICAL EXAM:  Exam was deferred as patient was being resuscitated and cannulated with ECMO    ============================I/O===========================   I&O's Detail    2022 07:01  -  2022 07:00  --------------------------------------------------------  IN:    Heparin Infusion: 20 mL    IV PiggyBack: 250 mL    Lactated Ringers: 1800 mL  Total IN: 2070 mL    OUT:    Voided (mL): 675 mL  Total OUT: 675 mL    Total NET: 1395 mL      2022 07:01  -  2022 17:52  --------------------------------------------------------  IN:    Heparin: 24 mL    Heparin Infusion: 50 mL    IV PiggyBack: 200 mL    IV PiggyBack: 200 mL    Lactated Ringers: 700 mL    Phenylephrine: 8.7 mL  Total IN: 1182.7 mL    OUT:    Indwelling Catheter - Urethral (mL): 450 mL    Norepinephrine: 0 mL    Voided (mL): 475 mL  Total OUT: 925 mL    Total NET: 257.7 mL        ============================ LABS =========================                        9.9    19.42 )-----------( 141      ( 2022 16:11 )             29.6         152<H>  |  104  |  27<H>  ----------------------------<  232<H>  4.3   |  38<H>  |  1.62<H>    Ca    9.8      2022 16:11  Phos  3.8       Mg     2.0         TPro  4.9<L>  /  Alb  2.4<L>  /  TBili  0.8  /  DBili  x   /  AST  167<H>  /  ALT  60<H>  /  AlkPhos  62      Troponin T, High Sensitivity Result: 3464 ng/L (22 @ 16:11)  Troponin T, High Sensitivity Result: 3425 ng/L (22 @ 11:53)  Troponin T, High Sensitivity Result: 2445 ng/L (22 @ 03:59)  Troponin T, High Sensitivity Result: 1403 ng/L (22 @ 21:28)  Troponin T, High Sensitivity Result: 455 ng/L (22 @ 15:18)  Troponin T, High Sensitivity Result: 207 ng/L (22 @ 11:31)  Troponin T, High Sensitivity Result: 87 ng/L (22 @ 07:57)    CKMB Units: 113.4 ng/mL (22 @ 11:53)  CKMB Units: 9.5 ng/mL (22 @ 07:57)    Creatine Kinase, Serum: 1598 U/L (22 @ 11:53)  Creatine Kinase, Serum: 224 U/L (22 @ 07:57)    CPK Mass Assay %: 7.1 % (22 @ 11:53)  CPK Mass Assay %: 4.2 % (22 @ 07:57)        LIVER FUNCTIONS - ( 2022 16:11 )  Alb: 2.4 g/dL / Pro: 4.9 g/dL / ALK PHOS: 62 U/L / ALT: 60 U/L / AST: 167 U/L / GGT: x           PT/INR - ( 2022 07:57 )   PT: 12.0 sec;   INR: 1.04 ratio         PTT - ( 2022 11:53 )  PTT:44.1 sec  ABG - ( 2022 17:32 )  pH, Arterial: 7.28  pH, Blood: x     /  pCO2: 42    /  pO2: 424   / HCO3: 20    / Base Excess: -6.7  /  SaO2: 97.7              Blood Gas Arterial, Lactate: >16.0 mmol/L (22 @ 17:32)  Blood Gas Arterial, Lactate: 15.1 mmol/L (22 @ 17:00)  Blood Gas Arterial, Lactate: >16.0 mmol/L (22 @ 16:47)  Blood Gas Arterial, Lactate: 5.5 mmol/L (22 @ 16:01)  Blood Gas Arterial, Lactate: 1.4 mmol/L (22 @ 15:00)  Blood Gas Arterial, Lactate: 1.4 mmol/L (22 @ 11:49)  Blood Gas Arterial, Lactate: 1.2 mmol/L (22 @ 09:40)  Blood Gas Arterial, Lactate: 1.2 mmol/L (22 @ 08:35)  Blood Gas Venous - Lactate: 2.7 mmol/L (22 @ 07:55)    Urinalysis Basic - ( 2022 10:37 )    Color: Yellow / Appearance: Slightly Turbid / S.027 / pH: x  Gluc: x / Ketone: Trace  / Bili: Negative / Urobili: Negative   Blood: x / Protein: 30 mg/dL / Nitrite: Negative   Leuk Esterase: Negative / RBC: 7 /hpf / WBC 7 /HPF   Sq Epi: x / Non Sq Epi: 2 /hpf / Bacteria: Negative

## 2022-11-25 NOTE — CHART NOTE - NSCHARTNOTEFT_GEN_A_CORE
Pt with increasing HStrops to 2445. Pt remains chest pain free at this time, and is hemodynamically stable. c/w DAPT. Risk Vs benefit of start heparin gtt in discussed with surgery team at bedside. If  bleeding is deemed to be not a significant concern, then would consider starting heparin drip. continue to trend trops, TTE this morning. Please contact cardiology team with any change in clinical status. Pt with increasing HStrops to 2445. Pt remains chest pain free at this time, and is hemodynamically stable. c/w DAPT. Risk Vs benefit of start heparin gtt in discussed with surgery team at bedside. If  bleeding is deemed to be not a significant concern, then would consider starting heparin drip. continue to trend trops, TTE this morning. Please try to obtain record of his prior CABG.   Please contact cardiology team with any change in clinical status. Pt with increasing HStrops to 2445. Pt remains chest pain free at this time, and is hemodynamically stable. c/w DAPT. Risk Vs benefit of start heparin gtt in discussed with surgery team at bedside. If  bleeding is deemed to be not a significant concern, then would consider starting heparin drip. continue to trend trops, TTE this morning. Please try to obtain record of his prior CABG.   Please contact cardiology team with any change in clinical status.    Casimiro Barton, Cardiology Fellow, F-1    For all New Consults and Questions:  [email or web address]   Login: [email or web address] Pt with increasing HStrops to 2445. Pt remains chest pain free at this time, and is hemodynamically stable. c/w DAPT. Risk Vs benefit of start heparin gtt in discussed with surgery team at bedside. If  bleeding is deemed to be not a significant concern, then would consider starting heparin drip. continue to trend trops, TTE this morning. Please try to obtain record of his prior CABG.   Please contact cardiology team with any change in clinical status.    Casimiro Barton, Cardiology Fellow, F-1    For all New Consults and Questions:  [email or web address]   Login: [email or web address] Pt with increasing HStrops to 2445. Pt remains chest pain free at this time, and is hemodynamically stable. c/w DAPT. Risk Vs benefit of start heparin gtt in discussed with surgery team at bedside. If  bleeding is deemed to be not a significant concern, then would consider starting heparin drip. continue to trend trops, TTE this morning. Please try to obtain record of his prior CABG.   Please contact cardiology team with any change in clinical status.    Casimiro Barton, Cardiology Fellow, F-1    For all New Consults and Questions:  [email or web address]   Login: [email or web address]

## 2022-11-25 NOTE — CONSULT NOTE ADULT - PROBLEM SELECTOR RECOMMENDATION 2
- Pt with acute cholecystitis and acute interstitial edematous pancreatitis  - for MRCP today  - NPO/IVF  - IV abx  - Pain control   - mgt per primary team

## 2022-11-25 NOTE — PROGRESS NOTE ADULT - SUBJECTIVE AND OBJECTIVE BOX
Overnight Events: Pt with increasing O2 requirements this morning requiring non-rebreather     Review Of Systems: No chest pain, + shortness of breath, no palpitations            Current Meds:  acetaminophen   IVPB .. 1000 milliGRAM(s) IV Intermittent every 6 hours  aspirin  chewable 81 milliGRAM(s) Oral daily  carvedilol 6.25 milliGRAM(s) Oral every 12 hours  chlorhexidine 2% Cloths 1 Application(s) Topical <User Schedule>  clopidogrel Tablet 75 milliGRAM(s) Oral daily  heparin  Infusion.  Unit(s)/Hr IV Continuous <Continuous>  HYDROmorphone  Injectable 0.5 milliGRAM(s) IV Push every 4 hours PRN  influenza   Vaccine 0.5 milliLiter(s) IntraMuscular once  insulin lispro (ADMELOG) corrective regimen sliding scale   SubCutaneous every 6 hours  lactated ringers. 1000 milliLiter(s) IV Continuous <Continuous>  pantoprazole  Injectable 40 milliGRAM(s) IV Push two times a day      Vitals:  T(F): 101.7 (11-25), Max: 101.7 (11-25)  HR: 104 (11-25) (91 - 121)  BP: 112/58 (11-25) (98/55 - 135/74)  RR: 32 (11-25)  SpO2: 98% (11-25)  I&O's Summary    24 Nov 2022 07:01  -  25 Nov 2022 07:00  --------------------------------------------------------  IN: 2070 mL / OUT: 675 mL / NET: 1395 mL    25 Nov 2022 07:01  -  25 Nov 2022 11:04  --------------------------------------------------------  IN: 420 mL / OUT: 700 mL / NET: -280 mL        Physical Exam:     Appearance: Moderate distress  Eyes: pink conjunctiva  HEENT: Normal oral mucosa  Cardiovascular: RRR, S1, S2, no murmurs, rubs, or gallops; no edema; no JVD  Respiratory: Coarse breath sounds B/L   Gastrointestinal: soft, non-tender, non-distended   Musculoskeletal: No clubbing; no joint deformity   Neurologic: Normal speech, no facial asymmetry  Psychiatry: AAOx3, mood & affect appropriate  Skin: No rashes, ecchymoses, or cyanosis of exposed skin                             12.7   18.88 )-----------( 186      ( 25 Nov 2022 03:58 )             38.6     11-25    135  |  101  |  31<H>  ----------------------------<  195<H>  3.8   |  21<L>  |  1.95<H>    Ca    8.1<L>      25 Nov 2022 03:59  Phos  3.7     11-25  Mg     1.6     11-25    TPro  6.4  /  Alb  3.1<L>  /  TBili  1.8<H>  /  DBili  x   /  AST  262<H>  /  ALT  107<H>  /  AlkPhos  82  11-25    PT/INR - ( 24 Nov 2022 07:57 )   PT: 12.0 sec;   INR: 1.04 ratio         PTT - ( 24 Nov 2022 07:57 )  PTT:25.0 sec  CARDIAC MARKERS ( 25 Nov 2022 03:59 )  2445 ng/L / x     / x     / x     / x     / x      CARDIAC MARKERS ( 24 Nov 2022 21:28 )  1403 ng/L / x     / x     / x     / x     / x      CARDIAC MARKERS ( 24 Nov 2022 15:18 )  455 ng/L / x     / x     / x     / x     / x      CARDIAC MARKERS ( 24 Nov 2022 11:31 )  207 ng/L / x     / x     / x     / x     / x      CARDIAC MARKERS ( 24 Nov 2022 07:57 )  87 ng/L / x     / x     / 224 U/L / x     / 9.5 ng/mL      Serum Pro-Brain Natriuretic Peptide: 330 pg/mL (11-24 @ 07:57)        Interpretation of Telemetry: NSVT episode, 3-4 beats, otherwise sinus rhythm to sinus tachycardia

## 2022-11-26 LAB
ALBUMIN SERPL ELPH-MCNC: 3.2 G/DL — LOW (ref 3.3–5)
ALP SERPL-CCNC: 56 U/L — SIGNIFICANT CHANGE UP (ref 40–120)
ALT FLD-CCNC: 354 U/L — HIGH (ref 10–45)
AMMONIA BLD-MCNC: 16 UMOL/L — SIGNIFICANT CHANGE UP (ref 11–55)
AMYLASE P1 CFR SERPL: 447 U/L — HIGH (ref 25–125)
ANION GAP SERPL CALC-SCNC: 25 MMOL/L — HIGH (ref 5–17)
APTT BLD: 37.2 SEC — HIGH (ref 27.5–35.5)
APTT BLD: 40.8 SEC — HIGH (ref 27.5–35.5)
APTT BLD: 41.3 SEC — HIGH (ref 27.5–35.5)
AST SERPL-CCNC: 721 U/L — HIGH (ref 10–40)
BASE EXCESS BLDV CALC-SCNC: 1.6 MMOL/L — SIGNIFICANT CHANGE UP (ref -2–3)
BASE EXCESS BLDV CALC-SCNC: 10.2 MMOL/L — HIGH (ref -2–3)
BASE EXCESS BLDV CALC-SCNC: 11.4 MMOL/L — HIGH (ref -2–3)
BASE EXCESS BLDV CALC-SCNC: 9.4 MMOL/L — HIGH (ref -2–3)
BILIRUB SERPL-MCNC: 2.4 MG/DL — HIGH (ref 0.2–1.2)
BLOOD GAS COMMENTS, VENOUS: SIGNIFICANT CHANGE UP
BLOOD GAS VENOUS - CREATININE: SIGNIFICANT CHANGE UP MG/DL (ref 0.5–1.3)
BUN SERPL-MCNC: 39 MG/DL — HIGH (ref 7–23)
CA-I SERPL-SCNC: 1.2 MMOL/L — SIGNIFICANT CHANGE UP (ref 1.15–1.33)
CALCIUM SERPL-MCNC: 9.8 MG/DL — SIGNIFICANT CHANGE UP (ref 8.4–10.5)
CHLORIDE BLDV-SCNC: 106 MMOL/L — SIGNIFICANT CHANGE UP (ref 96–108)
CHLORIDE SERPL-SCNC: 101 MMOL/L — SIGNIFICANT CHANGE UP (ref 96–108)
CK MB BLD-MCNC: 8.7 % — HIGH (ref 0–3.5)
CK MB CFR SERPL CALC: 247.6 NG/ML — HIGH (ref 0–6.7)
CK SERPL-CCNC: 2856 U/L — HIGH (ref 30–200)
CO2 BLDV-SCNC: 29 MMOL/L — HIGH (ref 22–26)
CO2 BLDV-SCNC: 36 MMOL/L — HIGH (ref 22–26)
CO2 BLDV-SCNC: 37 MMOL/L — HIGH (ref 22–26)
CO2 BLDV-SCNC: 38 MMOL/L — HIGH (ref 22–26)
CO2 SERPL-SCNC: 25 MMOL/L — SIGNIFICANT CHANGE UP (ref 22–31)
CREAT SERPL-MCNC: 2.83 MG/DL — HIGH (ref 0.5–1.3)
EGFR: 24 ML/MIN/1.73M2 — LOW
FIBRINOGEN PPP-MCNC: 328 MG/DL — SIGNIFICANT CHANGE UP (ref 200–445)
GAS PNL BLDA: SIGNIFICANT CHANGE UP
GAS PNL BLDV: 145 MMOL/L — SIGNIFICANT CHANGE UP (ref 136–145)
GAS PNL BLDV: SIGNIFICANT CHANGE UP
GLUCOSE BLDC GLUCOMTR-MCNC: 101 MG/DL — HIGH (ref 70–99)
GLUCOSE BLDC GLUCOMTR-MCNC: 110 MG/DL — HIGH (ref 70–99)
GLUCOSE BLDC GLUCOMTR-MCNC: 116 MG/DL — HIGH (ref 70–99)
GLUCOSE BLDC GLUCOMTR-MCNC: 120 MG/DL — HIGH (ref 70–99)
GLUCOSE BLDC GLUCOMTR-MCNC: 121 MG/DL — HIGH (ref 70–99)
GLUCOSE BLDC GLUCOMTR-MCNC: 123 MG/DL — HIGH (ref 70–99)
GLUCOSE BLDC GLUCOMTR-MCNC: 132 MG/DL — HIGH (ref 70–99)
GLUCOSE BLDC GLUCOMTR-MCNC: 136 MG/DL — HIGH (ref 70–99)
GLUCOSE BLDC GLUCOMTR-MCNC: 148 MG/DL — HIGH (ref 70–99)
GLUCOSE BLDC GLUCOMTR-MCNC: 152 MG/DL — HIGH (ref 70–99)
GLUCOSE BLDC GLUCOMTR-MCNC: 153 MG/DL — HIGH (ref 70–99)
GLUCOSE BLDC GLUCOMTR-MCNC: 156 MG/DL — HIGH (ref 70–99)
GLUCOSE BLDC GLUCOMTR-MCNC: 168 MG/DL — HIGH (ref 70–99)
GLUCOSE BLDC GLUCOMTR-MCNC: 202 MG/DL — HIGH (ref 70–99)
GLUCOSE BLDC GLUCOMTR-MCNC: 209 MG/DL — HIGH (ref 70–99)
GLUCOSE BLDC GLUCOMTR-MCNC: 241 MG/DL — HIGH (ref 70–99)
GLUCOSE BLDC GLUCOMTR-MCNC: 242 MG/DL — HIGH (ref 70–99)
GLUCOSE BLDC GLUCOMTR-MCNC: 264 MG/DL — HIGH (ref 70–99)
GLUCOSE BLDC GLUCOMTR-MCNC: 95 MG/DL — SIGNIFICANT CHANGE UP (ref 70–99)
GLUCOSE BLDV-MCNC: 236 MG/DL — HIGH (ref 70–99)
GLUCOSE SERPL-MCNC: 271 MG/DL — HIGH (ref 70–99)
GRAM STN FLD: SIGNIFICANT CHANGE UP
HAPTOGLOB SERPL-MCNC: 77 MG/DL — SIGNIFICANT CHANGE UP (ref 34–200)
HCO3 BLDV-SCNC: 27 MMOL/L — SIGNIFICANT CHANGE UP (ref 22–29)
HCO3 BLDV-SCNC: 34 MMOL/L — HIGH (ref 22–29)
HCO3 BLDV-SCNC: 36 MMOL/L — HIGH (ref 22–29)
HCT VFR BLD CALC: 28.8 % — LOW (ref 39–50)
HCT VFR BLDA CALC: 27 % — LOW (ref 39–51)
HGB BLD CALC-MCNC: 9 G/DL — LOW (ref 12.6–17.4)
HGB BLD-MCNC: 9.8 G/DL — LOW (ref 13–17)
HOROWITZ INDEX BLDV+IHG-RTO: 100 — SIGNIFICANT CHANGE UP
INR BLD: 1.48 RATIO — HIGH (ref 0.88–1.16)
LACTATE BLDV-MCNC: 9.6 MMOL/L — CRITICAL HIGH (ref 0.5–2)
LDH SERPL L TO P-CCNC: 1388 U/L — HIGH (ref 50–242)
LIDOCAIN IGE QN: 672 U/L — HIGH (ref 7–60)
MAGNESIUM SERPL-MCNC: 2.7 MG/DL — HIGH (ref 1.6–2.6)
MCHC RBC-ENTMCNC: 29.9 PG — SIGNIFICANT CHANGE UP (ref 27–34)
MCHC RBC-ENTMCNC: 34 GM/DL — SIGNIFICANT CHANGE UP (ref 32–36)
MCV RBC AUTO: 87.8 FL — SIGNIFICANT CHANGE UP (ref 80–100)
NRBC # BLD: 0 /100 WBCS — SIGNIFICANT CHANGE UP (ref 0–0)
PCO2 BLDV: 45 MMHG — SIGNIFICANT CHANGE UP (ref 42–55)
PCO2 BLDV: 46 MMHG — SIGNIFICANT CHANGE UP (ref 42–55)
PCO2 BLDV: 47 MMHG — SIGNIFICANT CHANGE UP (ref 42–55)
PCO2 BLDV: 52 MMHG — SIGNIFICANT CHANGE UP (ref 42–55)
PH BLDV: 7.37 — SIGNIFICANT CHANGE UP (ref 7.32–7.43)
PH BLDV: 7.45 — HIGH (ref 7.32–7.43)
PH BLDV: 7.48 — HIGH (ref 7.32–7.43)
PH BLDV: 7.51 — HIGH (ref 7.32–7.43)
PHOSPHATE SERPL-MCNC: 2.6 MG/DL — SIGNIFICANT CHANGE UP (ref 2.5–4.5)
PLATELET # BLD AUTO: 107 K/UL — LOW (ref 150–400)
PO2 BLDV: 52 MMHG — HIGH (ref 25–45)
PO2 BLDV: 53 MMHG — HIGH (ref 25–45)
PO2 BLDV: 54 MMHG — HIGH (ref 25–45)
PO2 BLDV: 59 MMHG — HIGH (ref 25–45)
POTASSIUM BLDV-SCNC: 3.5 MMOL/L — SIGNIFICANT CHANGE UP (ref 3.5–5.1)
POTASSIUM SERPL-MCNC: 3.1 MMOL/L — LOW (ref 3.5–5.3)
POTASSIUM SERPL-SCNC: 3.1 MMOL/L — LOW (ref 3.5–5.3)
PROT SERPL-MCNC: 5.2 G/DL — LOW (ref 6–8.3)
PROTHROM AB SERPL-ACNC: 17.2 SEC — HIGH (ref 10.5–13.4)
RBC # BLD: 3.28 M/UL — LOW (ref 4.2–5.8)
RBC # FLD: 15.4 % — HIGH (ref 10.3–14.5)
SAO2 % BLDV: 81.7 % — SIGNIFICANT CHANGE UP (ref 67–88)
SAO2 % BLDV: 84.9 % — SIGNIFICANT CHANGE UP (ref 67–88)
SAO2 % BLDV: 85.7 % — SIGNIFICANT CHANGE UP (ref 67–88)
SAO2 % BLDV: 89.4 % — HIGH (ref 67–88)
SODIUM SERPL-SCNC: 151 MMOL/L — HIGH (ref 135–145)
SPECIMEN SOURCE: SIGNIFICANT CHANGE UP
TROPONIN T, HIGH SENSITIVITY RESULT: HIGH NG/L (ref 0–51)
TSH SERPL-MCNC: 0.6 UIU/ML — SIGNIFICANT CHANGE UP (ref 0.27–4.2)
UFH PPP CHRO-ACNC: 0.04 IU/ML — LOW (ref 0.3–0.7)
WBC # BLD: 18.25 K/UL — HIGH (ref 3.8–10.5)
WBC # FLD AUTO: 18.25 K/UL — HIGH (ref 3.8–10.5)

## 2022-11-26 PROCEDURE — 99291 CRITICAL CARE FIRST HOUR: CPT | Mod: 25

## 2022-11-26 PROCEDURE — 99222 1ST HOSP IP/OBS MODERATE 55: CPT | Mod: GC

## 2022-11-26 PROCEDURE — 99292 CRITICAL CARE ADDL 30 MIN: CPT | Mod: 25

## 2022-11-26 PROCEDURE — 70450 CT HEAD/BRAIN W/O DYE: CPT | Mod: 26

## 2022-11-26 PROCEDURE — 71045 X-RAY EXAM CHEST 1 VIEW: CPT | Mod: 26

## 2022-11-26 PROCEDURE — 36620 INSERTION CATHETER ARTERY: CPT | Mod: GC

## 2022-11-26 PROCEDURE — 99231 SBSQ HOSP IP/OBS SF/LOW 25: CPT | Mod: GC

## 2022-11-26 PROCEDURE — 93010 ELECTROCARDIOGRAM REPORT: CPT

## 2022-11-26 PROCEDURE — 31645 BRNCHSC W/THER ASPIR 1ST: CPT

## 2022-11-26 PROCEDURE — 93306 TTE W/DOPPLER COMPLETE: CPT | Mod: 26

## 2022-11-26 PROCEDURE — 36556 INSERT NON-TUNNEL CV CATH: CPT | Mod: GC

## 2022-11-26 RX ORDER — FUROSEMIDE 40 MG
2.5 TABLET ORAL
Qty: 500 | Refills: 0 | Status: DISCONTINUED | OUTPATIENT
Start: 2022-11-26 | End: 2022-11-26

## 2022-11-26 RX ORDER — ACETAZOLAMIDE 250 MG/1
250 TABLET ORAL ONCE
Refills: 0 | Status: DISCONTINUED | OUTPATIENT
Start: 2022-11-26 | End: 2022-11-26

## 2022-11-26 RX ORDER — ACETAZOLAMIDE 250 MG/1
500 TABLET ORAL EVERY 6 HOURS
Refills: 0 | Status: DISCONTINUED | OUTPATIENT
Start: 2022-11-26 | End: 2022-11-26

## 2022-11-26 RX ORDER — POTASSIUM CHLORIDE 20 MEQ
10 PACKET (EA) ORAL
Refills: 0 | Status: COMPLETED | OUTPATIENT
Start: 2022-11-26 | End: 2022-11-26

## 2022-11-26 RX ORDER — DEXMEDETOMIDINE HYDROCHLORIDE IN 0.9% SODIUM CHLORIDE 4 UG/ML
1.5 INJECTION INTRAVENOUS
Qty: 200 | Refills: 0 | Status: DISCONTINUED | OUTPATIENT
Start: 2022-11-26 | End: 2022-12-08

## 2022-11-26 RX ORDER — FUROSEMIDE 40 MG
40 TABLET ORAL ONCE
Refills: 0 | Status: COMPLETED | OUTPATIENT
Start: 2022-11-26 | End: 2022-11-26

## 2022-11-26 RX ORDER — ACETAZOLAMIDE 250 MG/1
500 TABLET ORAL EVERY 6 HOURS
Refills: 0 | Status: COMPLETED | OUTPATIENT
Start: 2022-11-26 | End: 2022-11-26

## 2022-11-26 RX ORDER — EPINEPHRINE 0.3 MG/.3ML
0.08 INJECTION INTRAMUSCULAR; SUBCUTANEOUS
Qty: 4 | Refills: 0 | Status: DISCONTINUED | OUTPATIENT
Start: 2022-11-26 | End: 2022-11-26

## 2022-11-26 RX ORDER — HYDROCORTISONE 20 MG
50 TABLET ORAL EVERY 8 HOURS
Refills: 0 | Status: DISCONTINUED | OUTPATIENT
Start: 2022-11-27 | End: 2022-11-28

## 2022-11-26 RX ORDER — POTASSIUM PHOSPHATE, MONOBASIC POTASSIUM PHOSPHATE, DIBASIC 236; 224 MG/ML; MG/ML
15 INJECTION, SOLUTION INTRAVENOUS ONCE
Refills: 0 | Status: COMPLETED | OUTPATIENT
Start: 2022-11-26 | End: 2022-11-26

## 2022-11-26 RX ORDER — ACETAZOLAMIDE 250 MG/1
250 TABLET ORAL ONCE
Refills: 0 | Status: COMPLETED | OUTPATIENT
Start: 2022-11-26 | End: 2022-11-26

## 2022-11-26 RX ORDER — EPINEPHRINE 0.3 MG/.3ML
0.07 INJECTION INTRAMUSCULAR; SUBCUTANEOUS
Qty: 4 | Refills: 0 | Status: DISCONTINUED | OUTPATIENT
Start: 2022-11-26 | End: 2022-11-26

## 2022-11-26 RX ADMIN — Medication 50 MILLIEQUIVALENT(S): at 10:30

## 2022-11-26 RX ADMIN — Medication 50 MILLIEQUIVALENT(S): at 04:45

## 2022-11-26 RX ADMIN — MEROPENEM 100 MILLIGRAM(S): 1 INJECTION INTRAVENOUS at 06:03

## 2022-11-26 RX ADMIN — Medication 50 MILLIEQUIVALENT(S): at 05:51

## 2022-11-26 RX ADMIN — Medication 50 MILLIEQUIVALENT(S): at 05:11

## 2022-11-26 RX ADMIN — Medication 50 MILLIEQUIVALENT(S): at 01:29

## 2022-11-26 RX ADMIN — ACETAZOLAMIDE 500 MILLIGRAM(S): 250 TABLET ORAL at 09:23

## 2022-11-26 RX ADMIN — Medication 100 MILLIGRAM(S): at 13:29

## 2022-11-26 RX ADMIN — POTASSIUM PHOSPHATE, MONOBASIC POTASSIUM PHOSPHATE, DIBASIC 62.5 MILLIMOLE(S): 236; 224 INJECTION, SOLUTION INTRAVENOUS at 03:09

## 2022-11-26 RX ADMIN — Medication 50 MILLIEQUIVALENT(S): at 18:58

## 2022-11-26 RX ADMIN — Medication 100 MILLIGRAM(S): at 05:23

## 2022-11-26 RX ADMIN — Medication 40 MILLIGRAM(S): at 02:24

## 2022-11-26 RX ADMIN — MEROPENEM 100 MILLIGRAM(S): 1 INJECTION INTRAVENOUS at 18:15

## 2022-11-26 RX ADMIN — ACETAZOLAMIDE 500 MILLIGRAM(S): 250 TABLET ORAL at 15:16

## 2022-11-26 RX ADMIN — Medication 1.25 MG/HR: at 05:11

## 2022-11-26 RX ADMIN — Medication 100 MILLIGRAM(S): at 21:24

## 2022-11-26 RX ADMIN — Medication 50 MILLIEQUIVALENT(S): at 00:45

## 2022-11-26 RX ADMIN — Medication 50 MILLIEQUIVALENT(S): at 18:40

## 2022-11-26 RX ADMIN — Medication 50 MILLIEQUIVALENT(S): at 02:09

## 2022-11-26 RX ADMIN — Medication 50 MILLIEQUIVALENT(S): at 09:16

## 2022-11-26 RX ADMIN — CHLORHEXIDINE GLUCONATE 1 APPLICATION(S): 213 SOLUTION TOPICAL at 05:30

## 2022-11-26 RX ADMIN — CHLORHEXIDINE GLUCONATE 15 MILLILITER(S): 213 SOLUTION TOPICAL at 05:24

## 2022-11-26 RX ADMIN — Medication 50 MILLIEQUIVALENT(S): at 11:05

## 2022-11-26 RX ADMIN — PANTOPRAZOLE SODIUM 40 MILLIGRAM(S): 20 TABLET, DELAYED RELEASE ORAL at 05:23

## 2022-11-26 RX ADMIN — CHLORHEXIDINE GLUCONATE 15 MILLILITER(S): 213 SOLUTION TOPICAL at 18:14

## 2022-11-26 RX ADMIN — ACETAZOLAMIDE 250 MILLIGRAM(S): 250 TABLET ORAL at 06:01

## 2022-11-26 RX ADMIN — Medication 50 MILLIEQUIVALENT(S): at 11:15

## 2022-11-26 RX ADMIN — PANTOPRAZOLE SODIUM 40 MILLIGRAM(S): 20 TABLET, DELAYED RELEASE ORAL at 18:14

## 2022-11-26 RX ADMIN — CHLORHEXIDINE GLUCONATE 1 APPLICATION(S): 213 SOLUTION TOPICAL at 05:24

## 2022-11-26 NOTE — PROGRESS NOTE ADULT - SUBJECTIVE AND OBJECTIVE BOX
CARDIOLOGY CONSULT PROGRESS NOTE  EDUARDO REAL  [id number]    INTERVAL EVENTS:  - tele:   -     ROS:  Negative, except as above.    MEDICATIONS  (STANDING):  chlorhexidine 0.12% Liquid 15 milliLiter(s) Oral Mucosa every 12 hours  chlorhexidine 2% Cloths 1 Application(s) Topical <User Schedule>  chlorhexidine 4% Liquid 1 Application(s) Topical <User Schedule>  dextrose 50% Injectable 50 milliLiter(s) IV Push every 15 minutes  EPINEPHrine    Infusion 0.07 MICROgram(s)/kG/Min (24.4 mL/Hr) IV Continuous <Continuous>  furosemide Infusion 2.5 mG/Hr (1.25 mL/Hr) IV Continuous <Continuous>  heparin  Infusion 500 Unit(s)/Hr (5 mL/Hr) IV Continuous <Continuous>  hydrocortisone sodium succinate Injectable 100 milliGRAM(s) IV Push every 8 hours  insulin lispro (ADMELOG) corrective regimen sliding scale   SubCutaneous every 6 hours  insulin regular Infusion 6 Unit(s)/Hr (6 mL/Hr) IV Continuous <Continuous>  meropenem  IVPB 1000 milliGRAM(s) IV Intermittent every 12 hours  norepinephrine Infusion 0.05 MICROgram(s)/kG/Min (8.71 mL/Hr) IV Continuous <Continuous>  pantoprazole  Injectable 40 milliGRAM(s) IV Push two times a day  propofol Infusion 10 MICROgram(s)/kG/Min (5.57 mL/Hr) IV Continuous <Continuous>  sodium chloride 0.9%. 1000 milliLiter(s) (10 mL/Hr) IV Continuous <Continuous>  vasopressin Infusion 0.1 Unit(s)/Min (15 mL/Hr) IV Continuous <Continuous>    MEDICATIONS  (PRN):  sodium chloride 0.9% lock flush 10 milliLiter(s) IV Push every 1 hour PRN Pre/post blood products, medications, blood draw, and to maintain line patency    Allergies  No Known Allergies    P/E:  Vital Signs Last 24 Hrs  T(C): 36.6 (26 Nov 2022 04:00), Max: 38.7 (25 Nov 2022 09:00)  T(F): 97.8 (26 Nov 2022 04:00), Max: 101.7 (25 Nov 2022 09:00)  HR: 69 (26 Nov 2022 07:30) (0 - 148)  BP: 104/61 (25 Nov 2022 12:30) (87/52 - 122/60)  BP(mean): 76 (25 Nov 2022 12:30) (64 - 87)  RR: 1 (26 Nov 2022 07:30) (0 - 127)  SpO2: 100% (26 Nov 2022 07:30) (67% - 100%)    25 Nov 2022 07:01  -  26 Nov 2022 07:00  IN:    Albumin 5% - 50 mL: 300 mL    Amiodarone: 75.1 mL    EPINEPHrine: 243.6 mL    EPINEPHrine: 80.2 mL    EPINEPHrine: 52.2 mL    Furosemide: 3.8 mL    Heparin: 35 mL    Heparin: 60 mL    Heparin Infusion: 50 mL    Insulin: 120 mL    IV PiggyBack: 950 mL    IV PiggyBack: 512.5 mL    Lactated Ringers: 1000 mL    Norepinephrine: 104.7 mL    Phenylephrine: 31.4 mL    Propofol: 144.3 mL    sodium chloride 0.9%: 70 mL    Vasopressin: 180 mL  Total IN: 4012.8 mL  OUT:    Dexmedetomidine: 0 mL    Indwelling Catheter - Urethral (mL): 1915 mL    Nasogastric/Oral tube (mL): 50 mL    Norepinephrine: 0 mL    Voided (mL): 475 mL  Total OUT: 2440 mL  Total NET: 1572.8 mL        I&O's Summary  25 Nov 2022 07:01  -  26 Nov 2022 07:00  --------------------------------------------------------  IN: 4012.8 mL / OUT: 2440 mL / NET: 1572.8 mL    Appearance: Moderate distress  Eyes: pink conjunctiva  HEENT: Normal oral mucosa  Cardiovascular: RRR, S1, S2, no murmurs, rubs, or gallops; no edema; no JVD  Respiratory: Coarse breath sounds B/L   Gastrointestinal: soft, non-tender, non-distended   Musculoskeletal: No clubbing; no joint deformity   Neurologic: Normal speech, no facial asymmetry  Psychiatry: AAOx3, mood & affect appropriate  Skin: No rashes, ecchymoses, or cyanosis of exposed skin     RELEVANT RECENT LABS/IMAGING/STUDIES:  TTE (11/25/22) -  1. Tethered mitral valve leaflets with normal opening.  Mitral annular calcification. Mild mitral regurgitation.  2. Moderately dilated left atrium.  LA volume index = 42  cc/m2.  3. Moderate segmental left ventricular systolic  dysfunction. Endocardial visualization enhanced with  intravenous injection of Ultrasonic Enhancing Agent  (Definity). No left ventricular thrombus. LVEF calculated  using biplane Sequeira's method is 43%. Akinesis of the  mid-basal anterolateral wall, inferior and inferolateral  wall with all remaining walls hypokinetic.  4. Moderate diastolic dysfunction (Stage II).  5. Normal right atrium.  6. Normal right ventricular size and function.  7. Normal tricuspid valve. Minimal tricuspid regurgitation.  8. No pericardial effusion seen.  *** No previous Echo exam.               9.8    18.25 )-----------( 107      ( 26 Nov 2022 01:53 )             28.8     11-26    151<H>  |  101  |  39<H>  ----------------------------<  271<H>  3.1<L>   |  25  |  2.83<H>    Ca    9.8      26 Nov 2022 01:53  Phos  2.6     11-26  Mg     2.7     11-26    TPro  5.2<L>  /  Alb  3.2<L>  /  TBili  2.4<H>  /  DBili  x   /  AST  721<H>  /  ALT  354<H>  /  AlkPhos  56  11-26    LIVER FUNCTIONS - ( 26 Nov 2022 01:53 )  Alb: 3.2 g/dL / Pro: 5.2 g/dL / ALK PHOS: 56 U/L / ALT: 354 U/L / AST: 721 U/L / GGT: x           PT/INR - ( 26 Nov 2022 01:45 )   PT: 17.2 sec;   INR: 1.48 ratio       PTT - ( 26 Nov 2022 01:45 )  PTT:37.2 sec    ABG - ( 26 Nov 2022 05:15 )  pH, Arterial: 7.52  pH, Blood: x     /  pCO2: 40    /  pO2: 417   / HCO3: 33    / Base Excess: 9.1   /  SaO2: 96.8      CARDIAC MARKERS ( 26 Nov 2022 01:53 )  x     / x     / 2856 U/L / x     / 247.6 ng/mL  CARDIAC MARKERS ( 25 Nov 2022 20:05 )  x     / x     / 2029 U/L / x     / 197.9 ng/mL  CARDIAC MARKERS ( 25 Nov 2022 11:53 )  x     / x     / 1598 U/L / x     / 113.4 ng/mL  CARDIAC MARKERS ( 24 Nov 2022 07:57 )  x     / x     / 224 U/L / x     / 9.5 ng/mL CARDIOLOGY CONSULT PROGRESS NOTE  EDUARDO REAL  [id number]    INTERVAL EVENTS:  - tele:   -     ROS:  Negative, except as above.    MEDICATIONS  (STANDING):  chlorhexidine 0.12% Liquid 15 milliLiter(s) Oral Mucosa every 12 hours  chlorhexidine 2% Cloths 1 Application(s) Topical <User Schedule>  chlorhexidine 4% Liquid 1 Application(s) Topical <User Schedule>  dextrose 50% Injectable 50 milliLiter(s) IV Push every 15 minutes  EPINEPHrine    Infusion 0.07 MICROgram(s)/kG/Min (24.4 mL/Hr) IV Continuous <Continuous>  furosemide Infusion 2.5 mG/Hr (1.25 mL/Hr) IV Continuous <Continuous>  heparin  Infusion 500 Unit(s)/Hr (5 mL/Hr) IV Continuous <Continuous>  hydrocortisone sodium succinate Injectable 100 milliGRAM(s) IV Push every 8 hours  insulin lispro (ADMELOG) corrective regimen sliding scale   SubCutaneous every 6 hours  insulin regular Infusion 6 Unit(s)/Hr (6 mL/Hr) IV Continuous <Continuous>  meropenem  IVPB 1000 milliGRAM(s) IV Intermittent every 12 hours  norepinephrine Infusion 0.05 MICROgram(s)/kG/Min (8.71 mL/Hr) IV Continuous <Continuous>  pantoprazole  Injectable 40 milliGRAM(s) IV Push two times a day  propofol Infusion 10 MICROgram(s)/kG/Min (5.57 mL/Hr) IV Continuous <Continuous>  sodium chloride 0.9%. 1000 milliLiter(s) (10 mL/Hr) IV Continuous <Continuous>  vasopressin Infusion 0.1 Unit(s)/Min (15 mL/Hr) IV Continuous <Continuous>    MEDICATIONS  (PRN):  sodium chloride 0.9% lock flush 10 milliLiter(s) IV Push every 1 hour PRN Pre/post blood products, medications, blood draw, and to maintain line patency    Allergies  No Known Allergies    P/E:  Vital Signs Last 24 Hrs  T(C): 36.6 (26 Nov 2022 04:00), Max: 38.7 (25 Nov 2022 09:00)  T(F): 97.8 (26 Nov 2022 04:00), Max: 101.7 (25 Nov 2022 09:00)  HR: 69 (26 Nov 2022 07:30) (0 - 148)  BP: 104/61 (25 Nov 2022 12:30) (87/52 - 122/60)  BP(mean): 76 (25 Nov 2022 12:30) (64 - 87)  RR: 1 (26 Nov 2022 07:30) (0 - 127)  SpO2: 100% (26 Nov 2022 07:30) (67% - 100%)    25 Nov 2022 07:01  -  26 Nov 2022 07:00  IN:    Albumin 5% - 50 mL: 300 mL    Amiodarone: 75.1 mL    EPINEPHrine: 243.6 mL    EPINEPHrine: 80.2 mL    EPINEPHrine: 52.2 mL    Furosemide: 3.8 mL    Heparin: 35 mL    Heparin: 60 mL    Heparin Infusion: 50 mL    Insulin: 120 mL    IV PiggyBack: 950 mL    IV PiggyBack: 512.5 mL    Lactated Ringers: 1000 mL    Norepinephrine: 104.7 mL    Phenylephrine: 31.4 mL    Propofol: 144.3 mL    sodium chloride 0.9%: 70 mL    Vasopressin: 180 mL  Total IN: 4012.8 mL  OUT:    Dexmedetomidine: 0 mL    Indwelling Catheter - Urethral (mL): 1915 mL    Nasogastric/Oral tube (mL): 50 mL    Norepinephrine: 0 mL    Voided (mL): 475 mL  Total OUT: 2440 mL  Total NET: 1572.8 mL        I&O's Summary  25 Nov 2022 07:01  -  26 Nov 2022 07:00  --------------------------------------------------------  IN: 4012.8 mL / OUT: 2440 mL / NET: 1572.8 mL    Appearance: Moderate distress  Eyes: pink conjunctiva  HEENT: Normal oral mucosa  Cardiovascular: RRR, S1, S2, no murmurs, rubs, or gallops; no edema; no JVD  Respiratory: Coarse breath sounds B/L   Gastrointestinal: soft, non-tender, non-distended   Musculoskeletal: No clubbing; no joint deformity   Neurologic: Normal speech, no facial asymmetry  Psychiatry: AAOx3, mood & affect appropriate  Skin: No rashes, ecchymoses, or cyanosis of exposed skin     RELEVANT RECENT LABS/IMAGING/STUDIES:  TTE (11/25/22) -  1. Tethered mitral valve leaflets with normal opening.  Mitral annular calcification. Mild mitral regurgitation.  2. Moderately dilated left atrium.  LA volume index = 42  cc/m2.  3. Moderate segmental left ventricular systolic  dysfunction. Endocardial visualization enhanced with  intravenous injection of Ultrasonic Enhancing Agent  (Definity). No left ventricular thrombus. LVEF calculated  using biplane Sequeira's method is 43%. Akinesis of the  mid-basal anterolateral wall, inferior and inferolateral  wall with all remaining walls hypokinetic.  4. Moderate diastolic dysfunction (Stage II).  5. Normal right atrium.  6. Normal right ventricular size and function.  7. Normal tricuspid valve. Minimal tricuspid regurgitation.  8. No pericardial effusion seen.  *** No previous Echo exam.               9.8    18.25 )-----------( 107      ( 26 Nov 2022 01:53 )             28.8     11-26    151<H>  |  101  |  39<H>  ----------------------------<  271<H>  3.1<L>   |  25  |  2.83<H>    Ca    9.8      26 Nov 2022 01:53  Phos  2.6     11-26  Mg     2.7     11-26    TPro  5.2<L>  /  Alb  3.2<L>  /  TBili  2.4<H>  /  DBili  x   /  AST  721<H>  /  ALT  354<H>  /  AlkPhos  56  11-26    LIVER FUNCTIONS - ( 26 Nov 2022 01:53 )  Alb: 3.2 g/dL / Pro: 5.2 g/dL / ALK PHOS: 56 U/L / ALT: 354 U/L / AST: 721 U/L / GGT: x           PT/INR - ( 26 Nov 2022 01:45 )   PT: 17.2 sec;   INR: 1.48 ratio       PTT - ( 26 Nov 2022 01:45 )  PTT:37.2 sec    ABG - ( 26 Nov 2022 05:15 )  pH, Arterial: 7.52  pH, Blood: x     /  pCO2: 40    /  pO2: 417   / HCO3: 33    / Base Excess: 9.1   /  SaO2: 96.8      CARDIAC MARKERS ( 26 Nov 2022 01:53 )  x     / x     / 2856 U/L / x     / 247.6 ng/mL  CARDIAC MARKERS ( 25 Nov 2022 20:05 )  x     / x     / 2029 U/L / x     / 197.9 ng/mL  CARDIAC MARKERS ( 25 Nov 2022 11:53 )  x     / x     / 1598 U/L / x     / 113.4 ng/mL  CARDIAC MARKERS ( 24 Nov 2022 07:57 )  x     / x     / 224 U/L / x     / 9.5 ng/mL CARDIOLOGY CONSULT PROGRESS NOTE  EDUARDO REAL  [id number]    INTERVAL EVENTS:  - tele:   -     ROS:  Negative, except as above.    MEDICATIONS  (STANDING):  chlorhexidine 0.12% Liquid 15 milliLiter(s) Oral Mucosa every 12 hours  chlorhexidine 2% Cloths 1 Application(s) Topical <User Schedule>  chlorhexidine 4% Liquid 1 Application(s) Topical <User Schedule>  dextrose 50% Injectable 50 milliLiter(s) IV Push every 15 minutes  EPINEPHrine    Infusion 0.07 MICROgram(s)/kG/Min (24.4 mL/Hr) IV Continuous <Continuous>  furosemide Infusion 2.5 mG/Hr (1.25 mL/Hr) IV Continuous <Continuous>  heparin  Infusion 500 Unit(s)/Hr (5 mL/Hr) IV Continuous <Continuous>  hydrocortisone sodium succinate Injectable 100 milliGRAM(s) IV Push every 8 hours  insulin lispro (ADMELOG) corrective regimen sliding scale   SubCutaneous every 6 hours  insulin regular Infusion 6 Unit(s)/Hr (6 mL/Hr) IV Continuous <Continuous>  meropenem  IVPB 1000 milliGRAM(s) IV Intermittent every 12 hours  norepinephrine Infusion 0.05 MICROgram(s)/kG/Min (8.71 mL/Hr) IV Continuous <Continuous>  pantoprazole  Injectable 40 milliGRAM(s) IV Push two times a day  propofol Infusion 10 MICROgram(s)/kG/Min (5.57 mL/Hr) IV Continuous <Continuous>  sodium chloride 0.9%. 1000 milliLiter(s) (10 mL/Hr) IV Continuous <Continuous>  vasopressin Infusion 0.1 Unit(s)/Min (15 mL/Hr) IV Continuous <Continuous>    MEDICATIONS  (PRN):  sodium chloride 0.9% lock flush 10 milliLiter(s) IV Push every 1 hour PRN Pre/post blood products, medications, blood draw, and to maintain line patency    Allergies  No Known Allergies    P/E:  Vital Signs Last 24 Hrs  T(C): 36.6 (26 Nov 2022 04:00), Max: 38.7 (25 Nov 2022 09:00)  T(F): 97.8 (26 Nov 2022 04:00), Max: 101.7 (25 Nov 2022 09:00)  HR: 69 (26 Nov 2022 07:30) (0 - 148)  BP: 104/61 (25 Nov 2022 12:30) (87/52 - 122/60)  BP(mean): 76 (25 Nov 2022 12:30) (64 - 87)  RR: 1 (26 Nov 2022 07:30) (0 - 127)  SpO2: 100% (26 Nov 2022 07:30) (67% - 100%)    25 Nov 2022 07:01  -  26 Nov 2022 07:00  IN:    Albumin 5% - 50 mL: 300 mL    Amiodarone: 75.1 mL    EPINEPHrine: 243.6 mL    EPINEPHrine: 80.2 mL    EPINEPHrine: 52.2 mL    Furosemide: 3.8 mL    Heparin: 35 mL    Heparin: 60 mL    Heparin Infusion: 50 mL    Insulin: 120 mL    IV PiggyBack: 950 mL    IV PiggyBack: 512.5 mL    Lactated Ringers: 1000 mL    Norepinephrine: 104.7 mL    Phenylephrine: 31.4 mL    Propofol: 144.3 mL    sodium chloride 0.9%: 70 mL    Vasopressin: 180 mL  Total IN: 4012.8 mL  OUT:    Dexmedetomidine: 0 mL    Indwelling Catheter - Urethral (mL): 1915 mL    Nasogastric/Oral tube (mL): 50 mL    Norepinephrine: 0 mL    Voided (mL): 475 mL  Total OUT: 2440 mL  Total NET: 1572.8 mL        I&O's Summary  25 Nov 2022 07:01  -  26 Nov 2022 07:00  --------------------------------------------------------  IN: 4012.8 mL / OUT: 2440 mL / NET: 1572.8 mL    Appearance: Moderate distress  Eyes: pink conjunctiva  HEENT: Normal oral mucosa  Cardiovascular: RRR, S1, S2, no murmurs, rubs, or gallops; no edema; no JVD  Respiratory: Coarse breath sounds B/L   Gastrointestinal: soft, non-tender, non-distended   Musculoskeletal: No clubbing; no joint deformity   Neurologic: Normal speech, no facial asymmetry  Psychiatry: AAOx3, mood & affect appropriate  Skin: No rashes, ecchymoses, or cyanosis of exposed skin     RELEVANT RECENT LABS/IMAGING/STUDIES:  TTE (11/25/22) -  1. Tethered mitral valve leaflets with normal opening.  Mitral annular calcification. Mild mitral regurgitation.  2. Moderately dilated left atrium.  LA volume index = 42  cc/m2.  3. Moderate segmental left ventricular systolic  dysfunction. Endocardial visualization enhanced with  intravenous injection of Ultrasonic Enhancing Agent  (Definity). No left ventricular thrombus. LVEF calculated  using biplane Sequeira's method is 43%. Akinesis of the  mid-basal anterolateral wall, inferior and inferolateral  wall with all remaining walls hypokinetic.  4. Moderate diastolic dysfunction (Stage II).  5. Normal right atrium.  6. Normal right ventricular size and function.  7. Normal tricuspid valve. Minimal tricuspid regurgitation.  8. No pericardial effusion seen.  *** No previous Echo exam.               9.8    18.25 )-----------( 107      ( 26 Nov 2022 01:53 )             28.8     11-26    151<H>  |  101  |  39<H>  ----------------------------<  271<H>  3.1<L>   |  25  |  2.83<H>    Ca    9.8      26 Nov 2022 01:53  Phos  2.6     11-26  Mg     2.7     11-26    TPro  5.2<L>  /  Alb  3.2<L>  /  TBili  2.4<H>  /  DBili  x   /  AST  721<H>  /  ALT  354<H>  /  AlkPhos  56  11-26    LIVER FUNCTIONS - ( 26 Nov 2022 01:53 )  Alb: 3.2 g/dL / Pro: 5.2 g/dL / ALK PHOS: 56 U/L / ALT: 354 U/L / AST: 721 U/L / GGT: x           PT/INR - ( 26 Nov 2022 01:45 )   PT: 17.2 sec;   INR: 1.48 ratio       PTT - ( 26 Nov 2022 01:45 )  PTT:37.2 sec    ABG - ( 26 Nov 2022 05:15 )  pH, Arterial: 7.52  pH, Blood: x     /  pCO2: 40    /  pO2: 417   / HCO3: 33    / Base Excess: 9.1   /  SaO2: 96.8      CARDIAC MARKERS ( 26 Nov 2022 01:53 )  x     / x     / 2856 U/L / x     / 247.6 ng/mL  CARDIAC MARKERS ( 25 Nov 2022 20:05 )  x     / x     / 2029 U/L / x     / 197.9 ng/mL  CARDIAC MARKERS ( 25 Nov 2022 11:53 )  x     / x     / 1598 U/L / x     / 113.4 ng/mL  CARDIAC MARKERS ( 24 Nov 2022 07:57 )  x     / x     / 224 U/L / x     / 9.5 ng/mL CARDIOLOGY CONSULT PROGRESS NOTE  EDUARDO REAL  [id number]    INTERVAL EVENTS:  - tele: NSR   - intubated/sedation, on ECMO    ROS:  Negative, except as above.    MEDICATIONS  (STANDING):  chlorhexidine 0.12% Liquid 15 milliLiter(s) Oral Mucosa every 12 hours  chlorhexidine 2% Cloths 1 Application(s) Topical <User Schedule>  chlorhexidine 4% Liquid 1 Application(s) Topical <User Schedule>  dextrose 50% Injectable 50 milliLiter(s) IV Push every 15 minutes  EPINEPHrine    Infusion 0.07 MICROgram(s)/kG/Min (24.4 mL/Hr) IV Continuous <Continuous>  furosemide Infusion 2.5 mG/Hr (1.25 mL/Hr) IV Continuous <Continuous>  heparin  Infusion 500 Unit(s)/Hr (5 mL/Hr) IV Continuous <Continuous>  hydrocortisone sodium succinate Injectable 100 milliGRAM(s) IV Push every 8 hours  insulin lispro (ADMELOG) corrective regimen sliding scale   SubCutaneous every 6 hours  insulin regular Infusion 6 Unit(s)/Hr (6 mL/Hr) IV Continuous <Continuous>  meropenem  IVPB 1000 milliGRAM(s) IV Intermittent every 12 hours  norepinephrine Infusion 0.05 MICROgram(s)/kG/Min (8.71 mL/Hr) IV Continuous <Continuous>  pantoprazole  Injectable 40 milliGRAM(s) IV Push two times a day  propofol Infusion 10 MICROgram(s)/kG/Min (5.57 mL/Hr) IV Continuous <Continuous>  sodium chloride 0.9%. 1000 milliLiter(s) (10 mL/Hr) IV Continuous <Continuous>  vasopressin Infusion 0.1 Unit(s)/Min (15 mL/Hr) IV Continuous <Continuous>    MEDICATIONS  (PRN):  sodium chloride 0.9% lock flush 10 milliLiter(s) IV Push every 1 hour PRN Pre/post blood products, medications, blood draw, and to maintain line patency    Allergies  No Known Allergies    P/E:  Vital Signs Last 24 Hrs  T(C): 36.6 (26 Nov 2022 04:00), Max: 38.7 (25 Nov 2022 09:00)  T(F): 97.8 (26 Nov 2022 04:00), Max: 101.7 (25 Nov 2022 09:00)  HR: 69 (26 Nov 2022 07:30) (0 - 148)  BP: 104/61 (25 Nov 2022 12:30) (87/52 - 122/60)  BP(mean): 76 (25 Nov 2022 12:30) (64 - 87)  RR: 1 (26 Nov 2022 07:30) (0 - 127)  SpO2: 100% (26 Nov 2022 07:30) (67% - 100%)    25 Nov 2022 07:01  -  26 Nov 2022 07:00  IN:    Albumin 5% - 50 mL: 300 mL    Amiodarone: 75.1 mL    EPINEPHrine: 243.6 mL    EPINEPHrine: 80.2 mL    EPINEPHrine: 52.2 mL    Furosemide: 3.8 mL    Heparin: 35 mL    Heparin: 60 mL    Heparin Infusion: 50 mL    Insulin: 120 mL    IV PiggyBack: 950 mL    IV PiggyBack: 512.5 mL    Lactated Ringers: 1000 mL    Norepinephrine: 104.7 mL    Phenylephrine: 31.4 mL    Propofol: 144.3 mL    sodium chloride 0.9%: 70 mL    Vasopressin: 180 mL  Total IN: 4012.8 mL  OUT:    Dexmedetomidine: 0 mL    Indwelling Catheter - Urethral (mL): 1915 mL    Nasogastric/Oral tube (mL): 50 mL    Norepinephrine: 0 mL    Voided (mL): 475 mL  Total OUT: 2440 mL  Total NET: 1572.8 mL        I&O's Summary  25 Nov 2022 07:01  -  26 Nov 2022 07:00  --------------------------------------------------------  IN: 4012.8 mL / OUT: 2440 mL / NET: 1572.8 mL    Appearance: Moderate distress  Eyes: pink conjunctiva  HEENT: Normal oral mucosa  Cardiovascular: RRR, S1, S2, no murmurs, rubs, or gallops; no edema; no JVD  Respiratory: Coarse breath sounds B/L   Gastrointestinal: soft, non-tender, non-distended   Musculoskeletal: No clubbing; no joint deformity   Neurologic: Normal speech, no facial asymmetry  Psychiatry: AAOx3, mood & affect appropriate  Skin: No rashes, ecchymoses, or cyanosis of exposed skin     RELEVANT RECENT LABS/IMAGING/STUDIES:  TTE (11/25/22) -  1. Tethered mitral valve leaflets with normal opening.  Mitral annular calcification. Mild mitral regurgitation.  2. Moderately dilated left atrium.  LA volume index = 42  cc/m2.  3. Moderate segmental left ventricular systolic  dysfunction. Endocardial visualization enhanced with  intravenous injection of Ultrasonic Enhancing Agent  (Definity). No left ventricular thrombus. LVEF calculated  using biplane Sequeira's method is 43%. Akinesis of the  mid-basal anterolateral wall, inferior and inferolateral  wall with all remaining walls hypokinetic.  4. Moderate diastolic dysfunction (Stage II).  5. Normal right atrium.  6. Normal right ventricular size and function.  7. Normal tricuspid valve. Minimal tricuspid regurgitation.  8. No pericardial effusion seen.  *** No previous Echo exam.               9.8    18.25 )-----------( 107      ( 26 Nov 2022 01:53 )             28.8     11-26    151<H>  |  101  |  39<H>  ----------------------------<  271<H>  3.1<L>   |  25  |  2.83<H>    Ca    9.8      26 Nov 2022 01:53  Phos  2.6     11-26  Mg     2.7     11-26    TPro  5.2<L>  /  Alb  3.2<L>  /  TBili  2.4<H>  /  DBili  x   /  AST  721<H>  /  ALT  354<H>  /  AlkPhos  56  11-26    LIVER FUNCTIONS - ( 26 Nov 2022 01:53 )  Alb: 3.2 g/dL / Pro: 5.2 g/dL / ALK PHOS: 56 U/L / ALT: 354 U/L / AST: 721 U/L / GGT: x           PT/INR - ( 26 Nov 2022 01:45 )   PT: 17.2 sec;   INR: 1.48 ratio       PTT - ( 26 Nov 2022 01:45 )  PTT:37.2 sec    ABG - ( 26 Nov 2022 05:15 )  pH, Arterial: 7.52  pH, Blood: x     /  pCO2: 40    /  pO2: 417   / HCO3: 33    / Base Excess: 9.1   /  SaO2: 96.8      CARDIAC MARKERS ( 26 Nov 2022 01:53 )  x     / x     / 2856 U/L / x     / 247.6 ng/mL  CARDIAC MARKERS ( 25 Nov 2022 20:05 )  x     / x     / 2029 U/L / x     / 197.9 ng/mL  CARDIAC MARKERS ( 25 Nov 2022 11:53 )  x     / x     / 1598 U/L / x     / 113.4 ng/mL  CARDIAC MARKERS ( 24 Nov 2022 07:57 )  x     / x     / 224 U/L / x     / 9.5 ng/mL CARDIOLOGY CONSULT PROGRESS NOTE  EDUARDO REAL  [id number]    INTERVAL EVENTS:  - tele: NSR   - intubated/sedation, on ECMO    ROS:  Negative, except as above.    MEDICATIONS  (STANDING):  chlorhexidine 0.12% Liquid 15 milliLiter(s) Oral Mucosa every 12 hours  chlorhexidine 2% Cloths 1 Application(s) Topical <User Schedule>  chlorhexidine 4% Liquid 1 Application(s) Topical <User Schedule>  dextrose 50% Injectable 50 milliLiter(s) IV Push every 15 minutes  EPINEPHrine    Infusion 0.07 MICROgram(s)/kG/Min (24.4 mL/Hr) IV Continuous <Continuous>  furosemide Infusion 2.5 mG/Hr (1.25 mL/Hr) IV Continuous <Continuous>  heparin  Infusion 500 Unit(s)/Hr (5 mL/Hr) IV Continuous <Continuous>  hydrocortisone sodium succinate Injectable 100 milliGRAM(s) IV Push every 8 hours  insulin lispro (ADMELOG) corrective regimen sliding scale   SubCutaneous every 6 hours  insulin regular Infusion 6 Unit(s)/Hr (6 mL/Hr) IV Continuous <Continuous>  meropenem  IVPB 1000 milliGRAM(s) IV Intermittent every 12 hours  norepinephrine Infusion 0.05 MICROgram(s)/kG/Min (8.71 mL/Hr) IV Continuous <Continuous>  pantoprazole  Injectable 40 milliGRAM(s) IV Push two times a day  propofol Infusion 10 MICROgram(s)/kG/Min (5.57 mL/Hr) IV Continuous <Continuous>  sodium chloride 0.9%. 1000 milliLiter(s) (10 mL/Hr) IV Continuous <Continuous>  vasopressin Infusion 0.1 Unit(s)/Min (15 mL/Hr) IV Continuous <Continuous>    MEDICATIONS  (PRN):  sodium chloride 0.9% lock flush 10 milliLiter(s) IV Push every 1 hour PRN Pre/post blood products, medications, blood draw, and to maintain line patency    Allergies  No Known Allergies    P/E:  Vital Signs Last 24 Hrs  T(C): 36.6 (26 Nov 2022 04:00), Max: 38.7 (25 Nov 2022 09:00)  T(F): 97.8 (26 Nov 2022 04:00), Max: 101.7 (25 Nov 2022 09:00)  HR: 69 (26 Nov 2022 07:30) (0 - 148)  BP: 104/61 (25 Nov 2022 12:30) (87/52 - 122/60)  BP(mean): 76 (25 Nov 2022 12:30) (64 - 87)  RR: 1 (26 Nov 2022 07:30) (0 - 127)  SpO2: 100% (26 Nov 2022 07:30) (67% - 100%)    25 Nov 2022 07:01  -  26 Nov 2022 07:00  IN:    Albumin 5% - 50 mL: 300 mL    Amiodarone: 75.1 mL    EPINEPHrine: 243.6 mL    EPINEPHrine: 80.2 mL    EPINEPHrine: 52.2 mL    Furosemide: 3.8 mL    Heparin: 35 mL    Heparin: 60 mL    Heparin Infusion: 50 mL    Insulin: 120 mL    IV PiggyBack: 950 mL    IV PiggyBack: 512.5 mL    Lactated Ringers: 1000 mL    Norepinephrine: 104.7 mL    Phenylephrine: 31.4 mL    Propofol: 144.3 mL    sodium chloride 0.9%: 70 mL    Vasopressin: 180 mL  Total IN: 4012.8 mL  OUT:    Dexmedetomidine: 0 mL    Indwelling Catheter - Urethral (mL): 1915 mL    Nasogastric/Oral tube (mL): 50 mL    Norepinephrine: 0 mL    Voided (mL): 475 mL  Total OUT: 2440 mL  Total NET: 1572.8 mL        I&O's Summary  25 Nov 2022 07:01  -  26 Nov 2022 07:00  --------------------------------------------------------  IN: 4012.8 mL / OUT: 2440 mL / NET: 1572.8 mL    Appearance: Moderate distress  Eyes: pink conjunctiva  HEENT: Normal oral mucosa  Cardiovascular: RRR, S1, S2, no murmurs, rubs, or gallops; no edema; no JVD  Respiratory: Coarse breath sounds B/L   Gastrointestinal: soft, non-tender, non-distended   Musculoskeletal: No clubbing; no joint deformity   Neurologic: Normal speech, no facial asymmetry  Psychiatry: AAOx3, mood & affect appropriate  Skin: No rashes, ecchymoses, or cyanosis of exposed skin     RELEVANT RECENT LABS/IMAGING/STUDIES:  TTE (11/25/22) -  1. Tethered mitral valve leaflets with normal opening.  Mitral annular calcification. Mild mitral regurgitation.  2. Moderately dilated left atrium.  LA volume index = 42  cc/m2.  3. Moderate segmental left ventricular systolic  dysfunction. Endocardial visualization enhanced with  intravenous injection of Ultrasonic Enhancing Agent  (Definity). No left ventricular thrombus. LVEF calculated  using biplane Sequeira's method is 43%. Akinesis of the  mid-basal anterolateral wall, inferior and inferolateral  wall with all remaining walls hypokinetic.  4. Moderate diastolic dysfunction (Stage II).  5. Normal right atrium.  6. Normal right ventricular size and function.  7. Normal tricuspid valve. Minimal tricuspid regurgitation.  8. No pericardial effusion seen.  *** No previous Echo exam.               9.8    18.25 )-----------( 107      ( 26 Nov 2022 01:53 )             28.8     11-26    151<H>  |  101  |  39<H>  ----------------------------<  271<H>  3.1<L>   |  25  |  2.83<H>    Ca    9.8      26 Nov 2022 01:53  Phos  2.6     11-26  Mg     2.7     11-26    TPro  5.2<L>  /  Alb  3.2<L>  /  TBili  2.4<H>  /  DBili  x   /  AST  721<H>  /  ALT  354<H>  /  AlkPhos  56  11-26    LIVER FUNCTIONS - ( 26 Nov 2022 01:53 )  Alb: 3.2 g/dL / Pro: 5.2 g/dL / ALK PHOS: 56 U/L / ALT: 354 U/L / AST: 721 U/L / GGT: x           PT/INR - ( 26 Nov 2022 01:45 )   PT: 17.2 sec;   INR: 1.48 ratio       PTT - ( 26 Nov 2022 01:45 )  PTT:37.2 sec    ABG - ( 26 Nov 2022 05:15 )  pH, Arterial: 7.52  pH, Blood: x     /  pCO2: 40    /  pO2: 417   / HCO3: 33    / Base Excess: 9.1   /  SaO2: 96.8      CARDIAC MARKERS ( 26 Nov 2022 01:53 )  x     / x     / 2856 U/L / x     / 247.6 ng/mL  CARDIAC MARKERS ( 25 Nov 2022 20:05 )  x     / x     / 2029 U/L / x     / 197.9 ng/mL  CARDIAC MARKERS ( 25 Nov 2022 11:53 )  x     / x     / 1598 U/L / x     / 113.4 ng/mL  CARDIAC MARKERS ( 24 Nov 2022 07:57 )  x     / x     / 224 U/L / x     / 9.5 ng/mL CARDIOLOGY CONSULT PROGRESS NOTE  EDUARDO REAL  [id number]    INTERVAL EVENTS:  - tele: NSR   - intubated/sedation, on ECMO    ROS:  Negative, except as above.    MEDICATIONS  (STANDING):  chlorhexidine 0.12% Liquid 15 milliLiter(s) Oral Mucosa every 12 hours  chlorhexidine 2% Cloths 1 Application(s) Topical <User Schedule>  chlorhexidine 4% Liquid 1 Application(s) Topical <User Schedule>  dextrose 50% Injectable 50 milliLiter(s) IV Push every 15 minutes  EPINEPHrine    Infusion 0.07 MICROgram(s)/kG/Min (24.4 mL/Hr) IV Continuous <Continuous>  furosemide Infusion 2.5 mG/Hr (1.25 mL/Hr) IV Continuous <Continuous>  heparin  Infusion 500 Unit(s)/Hr (5 mL/Hr) IV Continuous <Continuous>  hydrocortisone sodium succinate Injectable 100 milliGRAM(s) IV Push every 8 hours  insulin lispro (ADMELOG) corrective regimen sliding scale   SubCutaneous every 6 hours  insulin regular Infusion 6 Unit(s)/Hr (6 mL/Hr) IV Continuous <Continuous>  meropenem  IVPB 1000 milliGRAM(s) IV Intermittent every 12 hours  norepinephrine Infusion 0.05 MICROgram(s)/kG/Min (8.71 mL/Hr) IV Continuous <Continuous>  pantoprazole  Injectable 40 milliGRAM(s) IV Push two times a day  propofol Infusion 10 MICROgram(s)/kG/Min (5.57 mL/Hr) IV Continuous <Continuous>  sodium chloride 0.9%. 1000 milliLiter(s) (10 mL/Hr) IV Continuous <Continuous>  vasopressin Infusion 0.1 Unit(s)/Min (15 mL/Hr) IV Continuous <Continuous>    MEDICATIONS  (PRN):  sodium chloride 0.9% lock flush 10 milliLiter(s) IV Push every 1 hour PRN Pre/post blood products, medications, blood draw, and to maintain line patency    Allergies  No Known Allergies    P/E:  Vital Signs Last 24 Hrs  T(C): 36.6 (26 Nov 2022 04:00), Max: 38.7 (25 Nov 2022 09:00)  T(F): 97.8 (26 Nov 2022 04:00), Max: 101.7 (25 Nov 2022 09:00)  HR: 69 (26 Nov 2022 07:30) (0 - 148)  BP: 104/61 (25 Nov 2022 12:30) (87/52 - 122/60)  BP(mean): 76 (25 Nov 2022 12:30) (64 - 87)  RR: 1 (26 Nov 2022 07:30) (0 - 127)  SpO2: 100% (26 Nov 2022 07:30) (67% - 100%)    25 Nov 2022 07:01  -  26 Nov 2022 07:00  IN:    Albumin 5% - 50 mL: 300 mL    Amiodarone: 75.1 mL    EPINEPHrine: 243.6 mL    EPINEPHrine: 80.2 mL    EPINEPHrine: 52.2 mL    Furosemide: 3.8 mL    Heparin: 35 mL    Heparin: 60 mL    Heparin Infusion: 50 mL    Insulin: 120 mL    IV PiggyBack: 950 mL    IV PiggyBack: 512.5 mL    Lactated Ringers: 1000 mL    Norepinephrine: 104.7 mL    Phenylephrine: 31.4 mL    Propofol: 144.3 mL    sodium chloride 0.9%: 70 mL    Vasopressin: 180 mL  Total IN: 4012.8 mL  OUT:    Dexmedetomidine: 0 mL    Indwelling Catheter - Urethral (mL): 1915 mL    Nasogastric/Oral tube (mL): 50 mL    Norepinephrine: 0 mL    Voided (mL): 475 mL  Total OUT: 2440 mL  Total NET: 1572.8 mL        I&O's Summary  25 Nov 2022 07:01  -  26 Nov 2022 07:00  --------------------------------------------------------  IN: 4012.8 mL / OUT: 2440 mL / NET: 1572.8 mL    Appearance: Moderate distress  Eyes: pink conjunctiva  HEENT: Normal oral mucosa  Cardiovascular: RRR, S1, S2, no murmurs, rubs, or gallops; no edema; no JVD  Respiratory: Coarse breath sounds B/L   Gastrointestinal: soft, non-tender, non-distended   Musculoskeletal: No clubbing; no joint deformity   Neurologic: Normal speech, no facial asymmetry  Psychiatry: AAOx3, mood & affect appropriate  Skin: No rashes, ecchymoses, or cyanosis of exposed skin     RELEVANT RECENT LABS/IMAGING/STUDIES:  TTE (11/25/22) -  1. Tethered mitral valve leaflets with normal opening.  Mitral annular calcification. Mild mitral regurgitation.  2. Moderately dilated left atrium.  LA volume index = 42  cc/m2.  3. Moderate segmental left ventricular systolic  dysfunction. Endocardial visualization enhanced with  intravenous injection of Ultrasonic Enhancing Agent  (Definity). No left ventricular thrombus. LVEF calculated  using biplane Sequeira's method is 43%. Akinesis of the  mid-basal anterolateral wall, inferior and inferolateral  wall with all remaining walls hypokinetic.  4. Moderate diastolic dysfunction (Stage II).  5. Normal right atrium.  6. Normal right ventricular size and function.  7. Normal tricuspid valve. Minimal tricuspid regurgitation.  8. No pericardial effusion seen.  *** No previous Echo exam.               9.8    18.25 )-----------( 107      ( 26 Nov 2022 01:53 )             28.8     11-26    151<H>  |  101  |  39<H>  ----------------------------<  271<H>  3.1<L>   |  25  |  2.83<H>    Ca    9.8      26 Nov 2022 01:53  Phos  2.6     11-26  Mg     2.7     11-26    TPro  5.2<L>  /  Alb  3.2<L>  /  TBili  2.4<H>  /  DBili  x   /  AST  721<H>  /  ALT  354<H>  /  AlkPhos  56  11-26    LIVER FUNCTIONS - ( 26 Nov 2022 01:53 )  Alb: 3.2 g/dL / Pro: 5.2 g/dL / ALK PHOS: 56 U/L / ALT: 354 U/L / AST: 721 U/L / GGT: x           PT/INR - ( 26 Nov 2022 01:45 )   PT: 17.2 sec;   INR: 1.48 ratio       PTT - ( 26 Nov 2022 01:45 )  PTT:37.2 sec    ABG - ( 26 Nov 2022 05:15 )  pH, Arterial: 7.52  pH, Blood: x     /  pCO2: 40    /  pO2: 417   / HCO3: 33    / Base Excess: 9.1   /  SaO2: 96.8      CARDIAC MARKERS ( 26 Nov 2022 01:53 )  x     / x     / 2856 U/L / x     / 247.6 ng/mL  CARDIAC MARKERS ( 25 Nov 2022 20:05 )  x     / x     / 2029 U/L / x     / 197.9 ng/mL  CARDIAC MARKERS ( 25 Nov 2022 11:53 )  x     / x     / 1598 U/L / x     / 113.4 ng/mL  CARDIAC MARKERS ( 24 Nov 2022 07:57 )  x     / x     / 224 U/L / x     / 9.5 ng/mL CARDIOLOGY CONSULT PROGRESS NOTE  EDUARDO REAL  [id number]    INTERVAL EVENTS:  - tele: NSR   - intubated/sedation, on ECMO    ROS:  Negative, except as above.    MEDICATIONS  (STANDING):  chlorhexidine 0.12% Liquid 15 milliLiter(s) Oral Mucosa every 12 hours  chlorhexidine 2% Cloths 1 Application(s) Topical <User Schedule>  chlorhexidine 4% Liquid 1 Application(s) Topical <User Schedule>  dextrose 50% Injectable 50 milliLiter(s) IV Push every 15 minutes  EPINEPHrine    Infusion 0.07 MICROgram(s)/kG/Min (24.4 mL/Hr) IV Continuous <Continuous>  furosemide Infusion 2.5 mG/Hr (1.25 mL/Hr) IV Continuous <Continuous>  heparin  Infusion 500 Unit(s)/Hr (5 mL/Hr) IV Continuous <Continuous>  hydrocortisone sodium succinate Injectable 100 milliGRAM(s) IV Push every 8 hours  insulin lispro (ADMELOG) corrective regimen sliding scale   SubCutaneous every 6 hours  insulin regular Infusion 6 Unit(s)/Hr (6 mL/Hr) IV Continuous <Continuous>  meropenem  IVPB 1000 milliGRAM(s) IV Intermittent every 12 hours  norepinephrine Infusion 0.05 MICROgram(s)/kG/Min (8.71 mL/Hr) IV Continuous <Continuous>  pantoprazole  Injectable 40 milliGRAM(s) IV Push two times a day  propofol Infusion 10 MICROgram(s)/kG/Min (5.57 mL/Hr) IV Continuous <Continuous>  sodium chloride 0.9%. 1000 milliLiter(s) (10 mL/Hr) IV Continuous <Continuous>  vasopressin Infusion 0.1 Unit(s)/Min (15 mL/Hr) IV Continuous <Continuous>    MEDICATIONS  (PRN):  sodium chloride 0.9% lock flush 10 milliLiter(s) IV Push every 1 hour PRN Pre/post blood products, medications, blood draw, and to maintain line patency    Allergies  No Known Allergies    P/E:  Vital Signs Last 24 Hrs  T(C): 36.6 (26 Nov 2022 04:00), Max: 38.7 (25 Nov 2022 09:00)  T(F): 97.8 (26 Nov 2022 04:00), Max: 101.7 (25 Nov 2022 09:00)  HR: 69 (26 Nov 2022 07:30) (0 - 148)  BP: 104/61 (25 Nov 2022 12:30) (87/52 - 122/60)  BP(mean): 76 (25 Nov 2022 12:30) (64 - 87)  RR: 1 (26 Nov 2022 07:30) (0 - 127)  SpO2: 100% (26 Nov 2022 07:30) (67% - 100%)    25 Nov 2022 07:01  -  26 Nov 2022 07:00  IN:    Albumin 5% - 50 mL: 300 mL    Amiodarone: 75.1 mL    EPINEPHrine: 243.6 mL    EPINEPHrine: 80.2 mL    EPINEPHrine: 52.2 mL    Furosemide: 3.8 mL    Heparin: 35 mL    Heparin: 60 mL    Heparin Infusion: 50 mL    Insulin: 120 mL    IV PiggyBack: 950 mL    IV PiggyBack: 512.5 mL    Lactated Ringers: 1000 mL    Norepinephrine: 104.7 mL    Phenylephrine: 31.4 mL    Propofol: 144.3 mL    sodium chloride 0.9%: 70 mL    Vasopressin: 180 mL  Total IN: 4012.8 mL  OUT:    Dexmedetomidine: 0 mL    Indwelling Catheter - Urethral (mL): 1915 mL    Nasogastric/Oral tube (mL): 50 mL    Norepinephrine: 0 mL    Voided (mL): 475 mL  Total OUT: 2440 mL  Total NET: 1572.8 mL    I&O's Summary  25 Nov 2022 07:01  -  26 Nov 2022 07:00  --------------------------------------------------------  IN: 4012.8 mL / OUT: 2440 mL / NET: 1572.8 mL    Appearance: intubated/sedated  Eyes: pink conjunctiva  HEENT: Normal oral mucosa  Cardiovascular: RRR, S1, S2, no murmurs, rubs, or gallops; no edema; no JVD  Respiratory: mechanical BS  Gastrointestinal: soft, non-distended   Musculoskeletal: No clubbing; no joint deformity   Skin: No rashes, ecchymoses, or cyanosis of exposed skin     RELEVANT RECENT LABS/IMAGING/STUDIES:  TTE (11/25/22) -  1. Tethered mitral valve leaflets with normal opening.  Mitral annular calcification. Mild mitral regurgitation.  2. Moderately dilated left atrium.  LA volume index = 42  cc/m2.  3. Moderate segmental left ventricular systolic  dysfunction. Endocardial visualization enhanced with  intravenous injection of Ultrasonic Enhancing Agent  (Definity). No left ventricular thrombus. LVEF calculated  using biplane Sequeira's method is 43%. Akinesis of the  mid-basal anterolateral wall, inferior and inferolateral  wall with all remaining walls hypokinetic.  4. Moderate diastolic dysfunction (Stage II).  5. Normal right atrium.  6. Normal right ventricular size and function.  7. Normal tricuspid valve. Minimal tricuspid regurgitation.  8. No pericardial effusion seen.  *** No previous Echo exam.               9.8    18.25 )-----------( 107      ( 26 Nov 2022 01:53 )             28.8     11-26    151<H>  |  101  |  39<H>  ----------------------------<  271<H>  3.1<L>   |  25  |  2.83<H>    Ca    9.8      26 Nov 2022 01:53  Phos  2.6     11-26  Mg     2.7     11-26    TPro  5.2<L>  /  Alb  3.2<L>  /  TBili  2.4<H>  /  DBili  x   /  AST  721<H>  /  ALT  354<H>  /  AlkPhos  56  11-26    LIVER FUNCTIONS - ( 26 Nov 2022 01:53 )  Alb: 3.2 g/dL / Pro: 5.2 g/dL / ALK PHOS: 56 U/L / ALT: 354 U/L / AST: 721 U/L / GGT: x           PT/INR - ( 26 Nov 2022 01:45 )   PT: 17.2 sec;   INR: 1.48 ratio       PTT - ( 26 Nov 2022 01:45 )  PTT:37.2 sec    ABG - ( 26 Nov 2022 05:15 )  pH, Arterial: 7.52  pH, Blood: x     /  pCO2: 40    /  pO2: 417   / HCO3: 33    / Base Excess: 9.1   /  SaO2: 96.8      CARDIAC MARKERS ( 26 Nov 2022 01:53 )  x     / x     / 2856 U/L / x     / 247.6 ng/mL  CARDIAC MARKERS ( 25 Nov 2022 20:05 )  x     / x     / 2029 U/L / x     / 197.9 ng/mL  CARDIAC MARKERS ( 25 Nov 2022 11:53 )  x     / x     / 1598 U/L / x     / 113.4 ng/mL  CARDIAC MARKERS ( 24 Nov 2022 07:57 )  x     / x     / 224 U/L / x     / 9.5 ng/mL CARDIOLOGY CONSULT PROGRESS NOTE  EDUARDO REAL  [id number]    INTERVAL EVENTS:  - tele: NSR   - intubated/sedation, on ECMO    ROS:  Negative, except as above.    MEDICATIONS  (STANDING):  chlorhexidine 0.12% Liquid 15 milliLiter(s) Oral Mucosa every 12 hours  chlorhexidine 2% Cloths 1 Application(s) Topical <User Schedule>  chlorhexidine 4% Liquid 1 Application(s) Topical <User Schedule>  dextrose 50% Injectable 50 milliLiter(s) IV Push every 15 minutes  EPINEPHrine    Infusion 0.07 MICROgram(s)/kG/Min (24.4 mL/Hr) IV Continuous <Continuous>  furosemide Infusion 2.5 mG/Hr (1.25 mL/Hr) IV Continuous <Continuous>  heparin  Infusion 500 Unit(s)/Hr (5 mL/Hr) IV Continuous <Continuous>  hydrocortisone sodium succinate Injectable 100 milliGRAM(s) IV Push every 8 hours  insulin lispro (ADMELOG) corrective regimen sliding scale   SubCutaneous every 6 hours  insulin regular Infusion 6 Unit(s)/Hr (6 mL/Hr) IV Continuous <Continuous>  meropenem  IVPB 1000 milliGRAM(s) IV Intermittent every 12 hours  norepinephrine Infusion 0.05 MICROgram(s)/kG/Min (8.71 mL/Hr) IV Continuous <Continuous>  pantoprazole  Injectable 40 milliGRAM(s) IV Push two times a day  propofol Infusion 10 MICROgram(s)/kG/Min (5.57 mL/Hr) IV Continuous <Continuous>  sodium chloride 0.9%. 1000 milliLiter(s) (10 mL/Hr) IV Continuous <Continuous>  vasopressin Infusion 0.1 Unit(s)/Min (15 mL/Hr) IV Continuous <Continuous>    MEDICATIONS  (PRN):  sodium chloride 0.9% lock flush 10 milliLiter(s) IV Push every 1 hour PRN Pre/post blood products, medications, blood draw, and to maintain line patency    Allergies  No Known Allergies    P/E:  Vital Signs Last 24 Hrs  T(C): 36.6 (26 Nov 2022 04:00), Max: 38.7 (25 Nov 2022 09:00)  T(F): 97.8 (26 Nov 2022 04:00), Max: 101.7 (25 Nov 2022 09:00)  HR: 69 (26 Nov 2022 07:30) (0 - 148)  BP: 104/61 (25 Nov 2022 12:30) (87/52 - 122/60)  BP(mean): 76 (25 Nov 2022 12:30) (64 - 87)  RR: 1 (26 Nov 2022 07:30) (0 - 127)  SpO2: 100% (26 Nov 2022 07:30) (67% - 100%)    25 Nov 2022 07:01  -  26 Nov 2022 07:00  IN:    Albumin 5% - 50 mL: 300 mL    Amiodarone: 75.1 mL    EPINEPHrine: 243.6 mL    EPINEPHrine: 80.2 mL    EPINEPHrine: 52.2 mL    Furosemide: 3.8 mL    Heparin: 35 mL    Heparin: 60 mL    Heparin Infusion: 50 mL    Insulin: 120 mL    IV PiggyBack: 950 mL    IV PiggyBack: 512.5 mL    Lactated Ringers: 1000 mL    Norepinephrine: 104.7 mL    Phenylephrine: 31.4 mL    Propofol: 144.3 mL    sodium chloride 0.9%: 70 mL    Vasopressin: 180 mL  Total IN: 4012.8 mL  OUT:    Dexmedetomidine: 0 mL    Indwelling Catheter - Urethral (mL): 1915 mL    Nasogastric/Oral tube (mL): 50 mL    Norepinephrine: 0 mL    Voided (mL): 475 mL  Total OUT: 2440 mL  Total NET: 1572.8 mL    I&O's Summary  25 Nov 2022 07:01  -  26 Nov 2022 07:00  --------------------------------------------------------  IN: 4012.8 mL / OUT: 2440 mL / NET: 1572.8 mL    Appearance: intubated/sedated  Eyes: pink conjunctiva  HEENT: Normal oral mucosa  Cardiovascular: RRR, S1, S2, no murmurs, rubs, or gallops; no edema; no JVD  Respiratory: mechanical BS  Gastrointestinal: soft, non-distended   Musculoskeletal: No clubbing; no joint deformity   Skin: No rashes, ecchymoses, or cyanosis of exposed skin     RELEVANT RECENT LABS/IMAGING/STUDIES:  TTE (11/25/22) -  1. Tethered mitral valve leaflets with normal opening.  Mitral annular calcification. Mild mitral regurgitation.  2. Moderately dilated left atrium.  LA volume index = 42  cc/m2.  3. Moderate segmental left ventricular systolic  dysfunction. Endocardial visualization enhanced with  intravenous injection of Ultrasonic Enhancing Agent  (Definity). No left ventricular thrombus. LVEF calculated  using biplane Sequeira's method is 43%. Akinesis of the  mid-basal anterolateral wall, inferior and inferolateral  wall with all remaining walls hypokinetic.  4. Moderate diastolic dysfunction (Stage II).  5. Normal right atrium.  6. Normal right ventricular size and function.  7. Normal tricuspid valve. Minimal tricuspid regurgitation.  8. No pericardial effusion seen.  *** No previous Echo exam.               9.8    18.25 )-----------( 107      ( 26 Nov 2022 01:53 )             28.8     11-26    151<H>  |  101  |  39<H>  ----------------------------<  271<H>  3.1<L>   |  25  |  2.83<H>    Ca    9.8      26 Nov 2022 01:53  Phos  2.6     11-26  Mg     2.7     11-26    TPro  5.2<L>  /  Alb  3.2<L>  /  TBili  2.4<H>  /  DBili  x   /  AST  721<H>  /  ALT  354<H>  /  AlkPhos  56  11-26    LIVER FUNCTIONS - ( 26 Nov 2022 01:53 )  Alb: 3.2 g/dL / Pro: 5.2 g/dL / ALK PHOS: 56 U/L / ALT: 354 U/L / AST: 721 U/L / GGT: x           PT/INR - ( 26 Nov 2022 01:45 )   PT: 17.2 sec;   INR: 1.48 ratio       PTT - ( 26 Nov 2022 01:45 )  PTT:37.2 sec    ABG - ( 26 Nov 2022 05:15 )  pH, Arterial: 7.52  pH, Blood: x     /  pCO2: 40    /  pO2: 417   / HCO3: 33    / Base Excess: 9.1   /  SaO2: 96.8      CARDIAC MARKERS ( 26 Nov 2022 01:53 )  x     / x     / 2856 U/L / x     / 247.6 ng/mL  CARDIAC MARKERS ( 25 Nov 2022 20:05 )  x     / x     / 2029 U/L / x     / 197.9 ng/mL  CARDIAC MARKERS ( 25 Nov 2022 11:53 )  x     / x     / 1598 U/L / x     / 113.4 ng/mL  CARDIAC MARKERS ( 24 Nov 2022 07:57 )  x     / x     / 224 U/L / x     / 9.5 ng/mL CARDIOLOGY CONSULT PROGRESS NOTE  EDUARDO REAL  [id number]    INTERVAL EVENTS:  - tele: NSR   - intubated/sedation, on ECMO    ROS:  Negative, except as above.    MEDICATIONS  (STANDING):  chlorhexidine 0.12% Liquid 15 milliLiter(s) Oral Mucosa every 12 hours  chlorhexidine 2% Cloths 1 Application(s) Topical <User Schedule>  chlorhexidine 4% Liquid 1 Application(s) Topical <User Schedule>  dextrose 50% Injectable 50 milliLiter(s) IV Push every 15 minutes  EPINEPHrine    Infusion 0.07 MICROgram(s)/kG/Min (24.4 mL/Hr) IV Continuous <Continuous>  furosemide Infusion 2.5 mG/Hr (1.25 mL/Hr) IV Continuous <Continuous>  heparin  Infusion 500 Unit(s)/Hr (5 mL/Hr) IV Continuous <Continuous>  hydrocortisone sodium succinate Injectable 100 milliGRAM(s) IV Push every 8 hours  insulin lispro (ADMELOG) corrective regimen sliding scale   SubCutaneous every 6 hours  insulin regular Infusion 6 Unit(s)/Hr (6 mL/Hr) IV Continuous <Continuous>  meropenem  IVPB 1000 milliGRAM(s) IV Intermittent every 12 hours  norepinephrine Infusion 0.05 MICROgram(s)/kG/Min (8.71 mL/Hr) IV Continuous <Continuous>  pantoprazole  Injectable 40 milliGRAM(s) IV Push two times a day  propofol Infusion 10 MICROgram(s)/kG/Min (5.57 mL/Hr) IV Continuous <Continuous>  sodium chloride 0.9%. 1000 milliLiter(s) (10 mL/Hr) IV Continuous <Continuous>  vasopressin Infusion 0.1 Unit(s)/Min (15 mL/Hr) IV Continuous <Continuous>    MEDICATIONS  (PRN):  sodium chloride 0.9% lock flush 10 milliLiter(s) IV Push every 1 hour PRN Pre/post blood products, medications, blood draw, and to maintain line patency    Allergies  No Known Allergies    P/E:  Vital Signs Last 24 Hrs  T(C): 36.6 (26 Nov 2022 04:00), Max: 38.7 (25 Nov 2022 09:00)  T(F): 97.8 (26 Nov 2022 04:00), Max: 101.7 (25 Nov 2022 09:00)  HR: 69 (26 Nov 2022 07:30) (0 - 148)  BP: 104/61 (25 Nov 2022 12:30) (87/52 - 122/60)  BP(mean): 76 (25 Nov 2022 12:30) (64 - 87)  RR: 1 (26 Nov 2022 07:30) (0 - 127)  SpO2: 100% (26 Nov 2022 07:30) (67% - 100%)    25 Nov 2022 07:01  -  26 Nov 2022 07:00  IN:    Albumin 5% - 50 mL: 300 mL    Amiodarone: 75.1 mL    EPINEPHrine: 243.6 mL    EPINEPHrine: 80.2 mL    EPINEPHrine: 52.2 mL    Furosemide: 3.8 mL    Heparin: 35 mL    Heparin: 60 mL    Heparin Infusion: 50 mL    Insulin: 120 mL    IV PiggyBack: 950 mL    IV PiggyBack: 512.5 mL    Lactated Ringers: 1000 mL    Norepinephrine: 104.7 mL    Phenylephrine: 31.4 mL    Propofol: 144.3 mL    sodium chloride 0.9%: 70 mL    Vasopressin: 180 mL  Total IN: 4012.8 mL  OUT:    Dexmedetomidine: 0 mL    Indwelling Catheter - Urethral (mL): 1915 mL    Nasogastric/Oral tube (mL): 50 mL    Norepinephrine: 0 mL    Voided (mL): 475 mL  Total OUT: 2440 mL  Total NET: 1572.8 mL    I&O's Summary  25 Nov 2022 07:01  -  26 Nov 2022 07:00  --------------------------------------------------------  IN: 4012.8 mL / OUT: 2440 mL / NET: 1572.8 mL    Appearance: intubated/sedated  Eyes: pink conjunctiva  HEENT: Normal oral mucosa  Cardiovascular: RRR, S1, S2, no murmurs, rubs, or gallops; no edema; no JVD  Respiratory: mechanical BS  Gastrointestinal: soft, non-distended   Musculoskeletal: No clubbing; no joint deformity   Skin: No rashes, ecchymoses, or cyanosis of exposed skin     RELEVANT RECENT LABS/IMAGING/STUDIES:  TTE (11/25/22) -  1. Tethered mitral valve leaflets with normal opening.  Mitral annular calcification. Mild mitral regurgitation.  2. Moderately dilated left atrium.  LA volume index = 42  cc/m2.  3. Moderate segmental left ventricular systolic  dysfunction. Endocardial visualization enhanced with  intravenous injection of Ultrasonic Enhancing Agent  (Definity). No left ventricular thrombus. LVEF calculated  using biplane Sequeira's method is 43%. Akinesis of the  mid-basal anterolateral wall, inferior and inferolateral  wall with all remaining walls hypokinetic.  4. Moderate diastolic dysfunction (Stage II).  5. Normal right atrium.  6. Normal right ventricular size and function.  7. Normal tricuspid valve. Minimal tricuspid regurgitation.  8. No pericardial effusion seen.  *** No previous Echo exam.               9.8    18.25 )-----------( 107      ( 26 Nov 2022 01:53 )             28.8     11-26    151<H>  |  101  |  39<H>  ----------------------------<  271<H>  3.1<L>   |  25  |  2.83<H>    Ca    9.8      26 Nov 2022 01:53  Phos  2.6     11-26  Mg     2.7     11-26    TPro  5.2<L>  /  Alb  3.2<L>  /  TBili  2.4<H>  /  DBili  x   /  AST  721<H>  /  ALT  354<H>  /  AlkPhos  56  11-26    LIVER FUNCTIONS - ( 26 Nov 2022 01:53 )  Alb: 3.2 g/dL / Pro: 5.2 g/dL / ALK PHOS: 56 U/L / ALT: 354 U/L / AST: 721 U/L / GGT: x           PT/INR - ( 26 Nov 2022 01:45 )   PT: 17.2 sec;   INR: 1.48 ratio       PTT - ( 26 Nov 2022 01:45 )  PTT:37.2 sec    ABG - ( 26 Nov 2022 05:15 )  pH, Arterial: 7.52  pH, Blood: x     /  pCO2: 40    /  pO2: 417   / HCO3: 33    / Base Excess: 9.1   /  SaO2: 96.8      CARDIAC MARKERS ( 26 Nov 2022 01:53 )  x     / x     / 2856 U/L / x     / 247.6 ng/mL  CARDIAC MARKERS ( 25 Nov 2022 20:05 )  x     / x     / 2029 U/L / x     / 197.9 ng/mL  CARDIAC MARKERS ( 25 Nov 2022 11:53 )  x     / x     / 1598 U/L / x     / 113.4 ng/mL  CARDIAC MARKERS ( 24 Nov 2022 07:57 )  x     / x     / 224 U/L / x     / 9.5 ng/mL CARDIOLOGY CONSULT PROGRESS NOTE  EDUARDO REAL  [id number]    INTERVAL EVENTS:  - tele: NSR   - intubated/sedation, on ECMO    ROS:  Negative, except as above.    MEDICATIONS  (STANDING):  chlorhexidine 0.12% Liquid 15 milliLiter(s) Oral Mucosa every 12 hours  chlorhexidine 2% Cloths 1 Application(s) Topical <User Schedule>  chlorhexidine 4% Liquid 1 Application(s) Topical <User Schedule>  dextrose 50% Injectable 50 milliLiter(s) IV Push every 15 minutes  EPINEPHrine    Infusion 0.07 MICROgram(s)/kG/Min (24.4 mL/Hr) IV Continuous <Continuous>  furosemide Infusion 2.5 mG/Hr (1.25 mL/Hr) IV Continuous <Continuous>  heparin  Infusion 500 Unit(s)/Hr (5 mL/Hr) IV Continuous <Continuous>  hydrocortisone sodium succinate Injectable 100 milliGRAM(s) IV Push every 8 hours  insulin lispro (ADMELOG) corrective regimen sliding scale   SubCutaneous every 6 hours  insulin regular Infusion 6 Unit(s)/Hr (6 mL/Hr) IV Continuous <Continuous>  meropenem  IVPB 1000 milliGRAM(s) IV Intermittent every 12 hours  norepinephrine Infusion 0.05 MICROgram(s)/kG/Min (8.71 mL/Hr) IV Continuous <Continuous>  pantoprazole  Injectable 40 milliGRAM(s) IV Push two times a day  propofol Infusion 10 MICROgram(s)/kG/Min (5.57 mL/Hr) IV Continuous <Continuous>  sodium chloride 0.9%. 1000 milliLiter(s) (10 mL/Hr) IV Continuous <Continuous>  vasopressin Infusion 0.1 Unit(s)/Min (15 mL/Hr) IV Continuous <Continuous>    MEDICATIONS  (PRN):  sodium chloride 0.9% lock flush 10 milliLiter(s) IV Push every 1 hour PRN Pre/post blood products, medications, blood draw, and to maintain line patency    Allergies  No Known Allergies    P/E:  Vital Signs Last 24 Hrs  T(C): 36.6 (26 Nov 2022 04:00), Max: 38.7 (25 Nov 2022 09:00)  T(F): 97.8 (26 Nov 2022 04:00), Max: 101.7 (25 Nov 2022 09:00)  HR: 69 (26 Nov 2022 07:30) (0 - 148)  BP: 104/61 (25 Nov 2022 12:30) (87/52 - 122/60)  BP(mean): 76 (25 Nov 2022 12:30) (64 - 87)  RR: 1 (26 Nov 2022 07:30) (0 - 127)  SpO2: 100% (26 Nov 2022 07:30) (67% - 100%)    25 Nov 2022 07:01  -  26 Nov 2022 07:00  IN:    Albumin 5% - 50 mL: 300 mL    Amiodarone: 75.1 mL    EPINEPHrine: 243.6 mL    EPINEPHrine: 80.2 mL    EPINEPHrine: 52.2 mL    Furosemide: 3.8 mL    Heparin: 35 mL    Heparin: 60 mL    Heparin Infusion: 50 mL    Insulin: 120 mL    IV PiggyBack: 950 mL    IV PiggyBack: 512.5 mL    Lactated Ringers: 1000 mL    Norepinephrine: 104.7 mL    Phenylephrine: 31.4 mL    Propofol: 144.3 mL    sodium chloride 0.9%: 70 mL    Vasopressin: 180 mL  Total IN: 4012.8 mL  OUT:    Dexmedetomidine: 0 mL    Indwelling Catheter - Urethral (mL): 1915 mL    Nasogastric/Oral tube (mL): 50 mL    Norepinephrine: 0 mL    Voided (mL): 475 mL  Total OUT: 2440 mL  Total NET: 1572.8 mL    I&O's Summary  25 Nov 2022 07:01  -  26 Nov 2022 07:00  --------------------------------------------------------  IN: 4012.8 mL / OUT: 2440 mL / NET: 1572.8 mL    Appearance: intubated/sedated  Eyes: pink conjunctiva  HEENT: Normal oral mucosa  Cardiovascular: RRR, S1, S2, no murmurs, rubs, or gallops; no edema; no JVD  Respiratory: mechanical BS  Gastrointestinal: soft, non-distended   Musculoskeletal: No clubbing; no joint deformity   Skin: No rashes, ecchymoses, or cyanosis of exposed skin     RELEVANT RECENT LABS/IMAGING/STUDIES:  TTE (11/25/22) -  1. Tethered mitral valve leaflets with normal opening.  Mitral annular calcification. Mild mitral regurgitation.  2. Moderately dilated left atrium.  LA volume index = 42  cc/m2.  3. Moderate segmental left ventricular systolic  dysfunction. Endocardial visualization enhanced with  intravenous injection of Ultrasonic Enhancing Agent  (Definity). No left ventricular thrombus. LVEF calculated  using biplane Sequeira's method is 43%. Akinesis of the  mid-basal anterolateral wall, inferior and inferolateral  wall with all remaining walls hypokinetic.  4. Moderate diastolic dysfunction (Stage II).  5. Normal right atrium.  6. Normal right ventricular size and function.  7. Normal tricuspid valve. Minimal tricuspid regurgitation.  8. No pericardial effusion seen.  *** No previous Echo exam.               9.8    18.25 )-----------( 107      ( 26 Nov 2022 01:53 )             28.8     11-26    151<H>  |  101  |  39<H>  ----------------------------<  271<H>  3.1<L>   |  25  |  2.83<H>    Ca    9.8      26 Nov 2022 01:53  Phos  2.6     11-26  Mg     2.7     11-26    TPro  5.2<L>  /  Alb  3.2<L>  /  TBili  2.4<H>  /  DBili  x   /  AST  721<H>  /  ALT  354<H>  /  AlkPhos  56  11-26    LIVER FUNCTIONS - ( 26 Nov 2022 01:53 )  Alb: 3.2 g/dL / Pro: 5.2 g/dL / ALK PHOS: 56 U/L / ALT: 354 U/L / AST: 721 U/L / GGT: x           PT/INR - ( 26 Nov 2022 01:45 )   PT: 17.2 sec;   INR: 1.48 ratio       PTT - ( 26 Nov 2022 01:45 )  PTT:37.2 sec    ABG - ( 26 Nov 2022 05:15 )  pH, Arterial: 7.52  pH, Blood: x     /  pCO2: 40    /  pO2: 417   / HCO3: 33    / Base Excess: 9.1   /  SaO2: 96.8      CARDIAC MARKERS ( 26 Nov 2022 01:53 )  x     / x     / 2856 U/L / x     / 247.6 ng/mL  CARDIAC MARKERS ( 25 Nov 2022 20:05 )  x     / x     / 2029 U/L / x     / 197.9 ng/mL  CARDIAC MARKERS ( 25 Nov 2022 11:53 )  x     / x     / 1598 U/L / x     / 113.4 ng/mL  CARDIAC MARKERS ( 24 Nov 2022 07:57 )  x     / x     / 224 U/L / x     / 9.5 ng/mL CARDIOLOGY CONSULT PROGRESS NOTE  EDUARDO REAL  [id number]    INTERVAL EVENTS:  - tele: NSR   - intubated/sedation, on ECMO    ROS:  Negative, except as above.    MEDICATIONS  (STANDING):  chlorhexidine 0.12% Liquid 15 milliLiter(s) Oral Mucosa every 12 hours  chlorhexidine 2% Cloths 1 Application(s) Topical <User Schedule>  chlorhexidine 4% Liquid 1 Application(s) Topical <User Schedule>  dextrose 50% Injectable 50 milliLiter(s) IV Push every 15 minutes  EPINEPHrine    Infusion 0.07 MICROgram(s)/kG/Min (24.4 mL/Hr) IV Continuous <Continuous>  furosemide Infusion 2.5 mG/Hr (1.25 mL/Hr) IV Continuous <Continuous>  heparin  Infusion 500 Unit(s)/Hr (5 mL/Hr) IV Continuous <Continuous>  hydrocortisone sodium succinate Injectable 100 milliGRAM(s) IV Push every 8 hours  insulin lispro (ADMELOG) corrective regimen sliding scale   SubCutaneous every 6 hours  insulin regular Infusion 6 Unit(s)/Hr (6 mL/Hr) IV Continuous <Continuous>  meropenem  IVPB 1000 milliGRAM(s) IV Intermittent every 12 hours  norepinephrine Infusion 0.05 MICROgram(s)/kG/Min (8.71 mL/Hr) IV Continuous <Continuous>  pantoprazole  Injectable 40 milliGRAM(s) IV Push two times a day  propofol Infusion 10 MICROgram(s)/kG/Min (5.57 mL/Hr) IV Continuous <Continuous>  sodium chloride 0.9%. 1000 milliLiter(s) (10 mL/Hr) IV Continuous <Continuous>  vasopressin Infusion 0.1 Unit(s)/Min (15 mL/Hr) IV Continuous <Continuous>    MEDICATIONS  (PRN):  sodium chloride 0.9% lock flush 10 milliLiter(s) IV Push every 1 hour PRN Pre/post blood products, medications, blood draw, and to maintain line patency    Allergies  No Known Allergies    P/E:  Vital Signs Last 24 Hrs  T(C): 36.6 (26 Nov 2022 04:00), Max: 38.7 (25 Nov 2022 09:00)  T(F): 97.8 (26 Nov 2022 04:00), Max: 101.7 (25 Nov 2022 09:00)  HR: 69 (26 Nov 2022 07:30) (0 - 148)  BP: 104/61 (25 Nov 2022 12:30) (87/52 - 122/60)  BP(mean): 76 (25 Nov 2022 12:30) (64 - 87)  RR: 1 (26 Nov 2022 07:30) (0 - 127)  SpO2: 100% (26 Nov 2022 07:30) (67% - 100%)    25 Nov 2022 07:01  -  26 Nov 2022 07:00  IN:    Albumin 5% - 50 mL: 300 mL    Amiodarone: 75.1 mL    EPINEPHrine: 243.6 mL    EPINEPHrine: 80.2 mL    EPINEPHrine: 52.2 mL    Furosemide: 3.8 mL    Heparin: 35 mL    Heparin: 60 mL    Heparin Infusion: 50 mL    Insulin: 120 mL    IV PiggyBack: 950 mL    IV PiggyBack: 512.5 mL    Lactated Ringers: 1000 mL    Norepinephrine: 104.7 mL    Phenylephrine: 31.4 mL    Propofol: 144.3 mL    sodium chloride 0.9%: 70 mL    Vasopressin: 180 mL  Total IN: 4012.8 mL  OUT:    Dexmedetomidine: 0 mL    Indwelling Catheter - Urethral (mL): 1915 mL    Nasogastric/Oral tube (mL): 50 mL    Norepinephrine: 0 mL    Voided (mL): 475 mL  Total OUT: 2440 mL  Total NET: 1572.8 mL    I&O's Summary  25 Nov 2022 07:01  -  26 Nov 2022 07:00  --------------------------------------------------------  IN: 4012.8 mL / OUT: 2440 mL / NET: 1572.8 mL    Appearance: intubated/sedated  Eyes: pink conjunctiva  HEENT: Normal oral mucosa  Cardiovascular: RRR, S1, S2, no murmurs, rubs, or gallops; no edema; no JVD  Respiratory: mechanical BS  Gastrointestinal: soft, non-distended   Musculoskeletal: No clubbing; no joint deformity   Skin: No rashes, ecchymoses, or cyanosis of exposed skin     RELEVANT RECENT LABS/IMAGING/STUDIES:  TTE (11/25/22) -  1. Tethered mitral valve leaflets with normal opening.  Mitral annular calcification. Mild mitral regurgitation.  2. Moderately dilated left atrium.  LA volume index = 42  cc/m2.  3. Moderate segmental left ventricular systolic  dysfunction. Endocardial visualization enhanced with  intravenous injection of Ultrasonic Enhancing Agent  (Definity). No left ventricular thrombus. LVEF calculated  using biplane Sequeira's method is 43%. Akinesis of the  mid-basal anterolateral wall, inferior and inferolateral  wall with all remaining walls hypokinetic.  4. Moderate diastolic dysfunction (Stage II).  5. Normal right atrium.  6. Normal right ventricular size and function.  7. Normal tricuspid valve. Minimal tricuspid regurgitation.  8. No pericardial effusion seen.  *** No previous Echo exam.               9.8    18.25 )-----------( 107      ( 26 Nov 2022 01:53 )             28.8     11-26    151<H>  |  101  |  39<H>  ----------------------------<  271<H>  3.1<L>   |  25  |  2.83<H>    Ca    9.8      26 Nov 2022 01:53  Phos  2.6     11-26  Mg     2.7     11-26    TPro  5.2<L>  /  Alb  3.2<L>  /  TBili  2.4<H>  /  DBili  x   /  AST  721<H>  /  ALT  354<H>  /  AlkPhos  56  11-26    LIVER FUNCTIONS - ( 26 Nov 2022 01:53 )  Alb: 3.2 g/dL / Pro: 5.2 g/dL / ALK PHOS: 56 U/L / ALT: 354 U/L / AST: 721 U/L / GGT: x           PT/INR - ( 26 Nov 2022 01:45 )   PT: 17.2 sec;   INR: 1.48 ratio       PTT - ( 26 Nov 2022 01:45 )  PTT:37.2 sec    ABG - ( 26 Nov 2022 05:15 )  pH, Arterial: 7.52  pH, Blood: x     /  pCO2: 40    /  pO2: 417   / HCO3: 33    / Base Excess: 9.1   /  SaO2: 96.8      CARDIAC MARKERS ( 26 Nov 2022 01:53 )  x     / x     / 2856 U/L / x     / 247.6 ng/mL  CARDIAC MARKERS ( 25 Nov 2022 20:05 )  x     / x     / 2029 U/L / x     / 197.9 ng/mL  CARDIAC MARKERS ( 25 Nov 2022 11:53 )  x     / x     / 1598 U/L / x     / 113.4 ng/mL  CARDIAC MARKERS ( 24 Nov 2022 07:57 )  x     / x     / 224 U/L / x     / 9.5 ng/mL CARDIOLOGY CONSULT PROGRESS NOTE  EDUARDO REAL  [id number]    INTERVAL EVENTS:  - tele: NSR   - intubated/sedation, on ECMO    ROS:  Negative, except as above.    MEDICATIONS  (STANDING):  chlorhexidine 0.12% Liquid 15 milliLiter(s) Oral Mucosa every 12 hours  chlorhexidine 2% Cloths 1 Application(s) Topical <User Schedule>  chlorhexidine 4% Liquid 1 Application(s) Topical <User Schedule>  dextrose 50% Injectable 50 milliLiter(s) IV Push every 15 minutes  EPINEPHrine    Infusion 0.07 MICROgram(s)/kG/Min (24.4 mL/Hr) IV Continuous <Continuous>  furosemide Infusion 2.5 mG/Hr (1.25 mL/Hr) IV Continuous <Continuous>  heparin  Infusion 500 Unit(s)/Hr (5 mL/Hr) IV Continuous <Continuous>  hydrocortisone sodium succinate Injectable 100 milliGRAM(s) IV Push every 8 hours  insulin lispro (ADMELOG) corrective regimen sliding scale   SubCutaneous every 6 hours  insulin regular Infusion 6 Unit(s)/Hr (6 mL/Hr) IV Continuous <Continuous>  meropenem  IVPB 1000 milliGRAM(s) IV Intermittent every 12 hours  norepinephrine Infusion 0.05 MICROgram(s)/kG/Min (8.71 mL/Hr) IV Continuous <Continuous>  pantoprazole  Injectable 40 milliGRAM(s) IV Push two times a day  propofol Infusion 10 MICROgram(s)/kG/Min (5.57 mL/Hr) IV Continuous <Continuous>  sodium chloride 0.9%. 1000 milliLiter(s) (10 mL/Hr) IV Continuous <Continuous>  vasopressin Infusion 0.1 Unit(s)/Min (15 mL/Hr) IV Continuous <Continuous>    MEDICATIONS  (PRN):  sodium chloride 0.9% lock flush 10 milliLiter(s) IV Push every 1 hour PRN Pre/post blood products, medications, blood draw, and to maintain line patency    Allergies  No Known Allergies    P/E:  Vital Signs Last 24 Hrs  T(C): 36.6 (26 Nov 2022 04:00), Max: 38.7 (25 Nov 2022 09:00)  T(F): 97.8 (26 Nov 2022 04:00), Max: 101.7 (25 Nov 2022 09:00)  HR: 69 (26 Nov 2022 07:30) (0 - 148)  BP: 104/61 (25 Nov 2022 12:30) (87/52 - 122/60)  BP(mean): 76 (25 Nov 2022 12:30) (64 - 87)  RR: 1 (26 Nov 2022 07:30) (0 - 127)  SpO2: 100% (26 Nov 2022 07:30) (67% - 100%)    25 Nov 2022 07:01  -  26 Nov 2022 07:00  IN:    Albumin 5% - 50 mL: 300 mL    Amiodarone: 75.1 mL    EPINEPHrine: 243.6 mL    EPINEPHrine: 80.2 mL    EPINEPHrine: 52.2 mL    Furosemide: 3.8 mL    Heparin: 35 mL    Heparin: 60 mL    Heparin Infusion: 50 mL    Insulin: 120 mL    IV PiggyBack: 950 mL    IV PiggyBack: 512.5 mL    Lactated Ringers: 1000 mL    Norepinephrine: 104.7 mL    Phenylephrine: 31.4 mL    Propofol: 144.3 mL    sodium chloride 0.9%: 70 mL    Vasopressin: 180 mL  Total IN: 4012.8 mL  OUT:    Dexmedetomidine: 0 mL    Indwelling Catheter - Urethral (mL): 1915 mL    Nasogastric/Oral tube (mL): 50 mL    Norepinephrine: 0 mL    Voided (mL): 475 mL  Total OUT: 2440 mL  Total NET: 1572.8 mL    I&O's Summary  25 Nov 2022 07:01  -  26 Nov 2022 07:00  --------------------------------------------------------  IN: 4012.8 mL / OUT: 2440 mL / NET: 1572.8 mL    Appearance: intubated/sedated  Eyes: pink conjunctiva  HEENT: Normal oral mucosa  Cardiovascular: RRR, S1, S2, no murmurs, rubs, or gallops; no edema; no JVD  Respiratory: mechanical BS  Gastrointestinal: soft, non-distended   Musculoskeletal: No clubbing; no joint deformity   Skin: No rashes, ecchymoses, or cyanosis of exposed skin     RELEVANT RECENT LABS/IMAGING/STUDIES:  TTE (11/25/22) -  1. Tethered mitral valve leaflets with normal opening.  Mitral annular calcification. Mild mitral regurgitation.  2. Moderately dilated left atrium.  LA volume index = 42  cc/m2.  3. Moderate segmental left ventricular systolic  dysfunction. Endocardial visualization enhanced with  intravenous injection of Ultrasonic Enhancing Agent  (Definity). No left ventricular thrombus. LVEF calculated  using biplane Sequeira's method is 43%. Akinesis of the  mid-basal anterolateral wall, inferior and inferolateral  wall with all remaining walls hypokinetic.  4. Moderate diastolic dysfunction (Stage II).  5. Normal right atrium.  6. Normal right ventricular size and function.  7. Normal tricuspid valve. Minimal tricuspid regurgitation.  8. No pericardial effusion seen.  *** No previous Echo exam.               9.8    18.25 )-----------( 107      ( 26 Nov 2022 01:53 )             28.8     11-26    151<H>  |  101  |  39<H>  ----------------------------<  271<H>  3.1<L>   |  25  |  2.83<H>    Ca    9.8      26 Nov 2022 01:53  Phos  2.6     11-26  Mg     2.7     11-26    TPro  5.2<L>  /  Alb  3.2<L>  /  TBili  2.4<H>  /  DBili  x   /  AST  721<H>  /  ALT  354<H>  /  AlkPhos  56  11-26    LIVER FUNCTIONS - ( 26 Nov 2022 01:53 )  Alb: 3.2 g/dL / Pro: 5.2 g/dL / ALK PHOS: 56 U/L / ALT: 354 U/L / AST: 721 U/L / GGT: x           PT/INR - ( 26 Nov 2022 01:45 )   PT: 17.2 sec;   INR: 1.48 ratio       PTT - ( 26 Nov 2022 01:45 )  PTT:37.2 sec    ABG - ( 26 Nov 2022 05:15 )  pH, Arterial: 7.52  pH, Blood: x     /  pCO2: 40    /  pO2: 417   / HCO3: 33    / Base Excess: 9.1   /  SaO2: 96.8      CARDIAC MARKERS ( 26 Nov 2022 01:53 )  x     / x     / 2856 U/L / x     / 247.6 ng/mL  CARDIAC MARKERS ( 25 Nov 2022 20:05 )  x     / x     / 2029 U/L / x     / 197.9 ng/mL  CARDIAC MARKERS ( 25 Nov 2022 11:53 )  x     / x     / 1598 U/L / x     / 113.4 ng/mL  CARDIAC MARKERS ( 24 Nov 2022 07:57 )  x     / x     / 224 U/L / x     / 9.5 ng/mL

## 2022-11-26 NOTE — CONSULT NOTE ADULT - SUBJECTIVE AND OBJECTIVE BOX
HPI:  Patient is a 63 y/o male with PMH of CAD s/p CABG, DM, HTN, HLD presenting as transfer from [place] for c/f STEMI. PTA arrival received 325 aspirin, 600 plavix, and no heparin drip started. Around 1:30 am patient had multiple episodes of non-bloody diarrhea and emesis with associated abdominal pain and chest pain, unable to localize, non-radiating, with associated SOB and no associated palpitations or diaphoresis.     At [facility] ED on , HDS. WBC 20, T.bili 3.4, Lipase 300. RUQ US demonstrated gallbladder stones, pericholecystic fluid, no CBD dilation.   Patient continues to have rising troponins and respiratory status. Pt later went into PEA cardiac arrest on , ROSC achieved. Patient was started on ECMO. 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 LVEF worsening.    Pt currently intubated, sedated on pressors. GI consulted due to concern for gallstone pancreatitis. CT A/P IVC  shows ARDS, Mild diffuse concentric wall thickening of the colon with adjacent stranding, suggestive of pancolitis. Mild pancreatitis. Mild pericholecystic fluid. Normal caliber bile ducts.     Allergies:  No Known Allergies    Home Medications:  aspirin 81 mg oral tablet: 1 tab(s) orally once a day (2022 12:50)  carvedilol 6.25 mg oral tablet: 1 tab(s) orally 2 times a day (2022 12:50)  HumuLIN 70/30 subcutaneous suspension: 18 unit(s) subcutaneous once a day (2022 12:50)  HumuLIN 70/30 subcutaneous suspension: 16 unit(s) subcutaneous once a day (at bedtime) (2022 12:50)  indapamide: 1.5 milligram(s) orally once a day (2022 12:50)  metFORMIN 1000 mg oral tablet, extended release: 1 tab(s) orally once a day (2022 12:51)  omeprazole 20 mg oral delayed release capsule: 1 cap(s) orally once a day (2022 12:50)  ramipril 2.5 mg oral tablet: 1 tab(s) orally once a day (2022 12:50)  rivaroxaban 2.5 mg oral tablet: 1 tab(s) orally once a day (2022 12:50)  rosuvastatin 10 mg oral tablet: 1 tab(s) orally once a day (2022 12:50)    Hospital Medications:  chlorhexidine 0.12% Liquid 15 milliLiter(s) Oral Mucosa every 12 hours  chlorhexidine 2% Cloths 1 Application(s) Topical <User Schedule>  chlorhexidine 4% Liquid 1 Application(s) Topical <User Schedule>  dexMEDEtomidine Infusion 1.5 MICROgram(s)/kG/Hr IV Continuous <Continuous>  dextrose 50% Injectable 50 milliLiter(s) IV Push every 15 minutes  EPINEPHrine    Infusion 0.07 MICROgram(s)/kG/Min IV Continuous <Continuous>  furosemide Infusion 2.5 mG/Hr IV Continuous <Continuous>  heparin  Infusion 500 Unit(s)/Hr IV Continuous <Continuous>  hydrocortisone sodium succinate Injectable 100 milliGRAM(s) IV Push every 8 hours  insulin lispro (ADMELOG) corrective regimen sliding scale   SubCutaneous every 6 hours  insulin regular Infusion 6 Unit(s)/Hr IV Continuous <Continuous>  meropenem  IVPB 1000 milliGRAM(s) IV Intermittent every 12 hours  norepinephrine Infusion 0.05 MICROgram(s)/kG/Min IV Continuous <Continuous>  pantoprazole  Injectable 40 milliGRAM(s) IV Push two times a day  propofol Infusion 10 MICROgram(s)/kG/Min IV Continuous <Continuous>  sodium chloride 0.9% lock flush 10 milliLiter(s) IV Push every 1 hour PRN  sodium chloride 0.9%. 1000 milliLiter(s) IV Continuous <Continuous>  vasopressin Infusion 0.1 Unit(s)/Min IV Continuous <Continuous>    PMHX/PSHX:      Family history:  N/A    Social History:   Tob: Denies  EtOH: Denies  Illicit Drugs: Denies    ROS:   Unable to obtain - intubated/sedated    PHYSICAL EXAM:     GENERAL:  No acute distress  HEENT:  NCAT, no scleral icterus   CHEST:  no respiratory distress  HEART:  Regular rate and rhythm  ABDOMEN:  Soft, non-tender, non-distended, normoactive bowel sounds,  no masses  EXTREMITIES: No edema  SKIN:  No rash/erythema/ecchymoses/petechiae/wounds/abscess/warm/dry  NEURO:  Alert and oriented x 3, no asterixis    Vital Signs:  Vital Signs Last 24 Hrs  T(C): 36.8 (2022 16:00), Max: 36.8 (2022 08:00)  T(F): 98.2 (2022 16:00), Max: 98.2 (2022 08:00)  HR: 85 (2022 16:15) (59 - 148)  BP: --  BP(mean): --  RR: 18 (2022 15:45) (0 - 30)  SpO2: 100% (2022 16:15) (75% - 100%)    Parameters below as of 2022 16:00  Patient On (Oxygen Delivery Method): ventilator    O2 Concentration (%): 40  Daily     Daily     LABS:                        9.8    18.25 )-----------( 107      ( 2022 01:53 )             28.8     Mean Cell Volume: 87.8 fl (22 @ 01:53)        151<H>  |  101  |  39<H>  ----------------------------<  271<H>  3.1<L>   |  25  |  2.83<H>    Ca    9.8      2022 01:53  Phos  2.6       Mg     2.7         TPro  5.2<L>  /  Alb  3.2<L>  /  TBili  2.4<H>  /  DBili  x   /  AST  721<H>  /  ALT  354<H>  /  AlkPhos  56      LIVER FUNCTIONS - ( 2022 01:53 )  Alb: 3.2 g/dL / Pro: 5.2 g/dL / ALK PHOS: 56 U/L / ALT: 354 U/L / AST: 721 U/L / GGT: x           PT/INR - ( 2022 01:45 )   PT: 17.2 sec;   INR: 1.48 ratio         PTT - ( 2022 01:45 )  PTT:37.2 sec  Urinalysis Basic - ( 2022 10:37 )    Color: Yellow / Appearance: Slightly Turbid / S.027 / pH: x  Gluc: x / Ketone: Trace  / Bili: Negative / Urobili: Negative   Blood: x / Protein: 30 mg/dL / Nitrite: Negative   Leuk Esterase: Negative / RBC: 7 /hpf / WBC 7 /HPF   Sq Epi: x / Non Sq Epi: 2 /hpf / Bacteria: Negative      Amylase [id number]      Lipase [id number]       Ammonia--  Amylase Serum--      Lipase serum--       [id number]                          9.8    18.25 )-----------( 107      ( 2022 01:53 )             28.8                         12.2   20.38 )-----------( 158      ( 2022 20:04 )             36.0                         10.6   18.55 )-----------( 161      ( 2022 18:27 )             32.4                         9.9    19.42 )-----------( 141      ( 2022 16:11 )             29.6                         12.3   16.88 )-----------( 195      ( 2022 11:52 )             36.9       Imaging:  ACC: [id number] EXAM:  CT CHEST IC                        ACC: [id number] EXAM:  CT ABDOMEN AND PELVIS IC                          PROCEDURE DATE:  2022      INTERPRETATION:  CLINICAL INFORMATION: Code.    COMPARISON: Noncontrast CT of the abdomen and pelvis 2022.    CONTRAST/COMPLICATIONS:  IV Contrast: IV contrast documented in associated exam (accession   [id number]), Omnipaque 350 (accession [id number])  90 cc administered   10 cc   discarded  Oral Contrast: NONE  Complications: None reported at time of study completion    PROCEDURE:  CT of the Chest, Abdomen and Pelvis was performed.  Sagittal and coronal reformats were performed.    FINDINGS:  CHEST:  LUNGS AND LARGE AIRWAYS: Endotracheal tube in place. Patent central   airways. Extensive bilateral perihilar consolidation and patchy   groundglass opacities in the upper lobes.  PLEURA: Trace bilateral layering pleural effusions. No pneumothorax or   pneumomediastinum.  VESSELS: Right internal jugular central venous catheter. The thoracic   aorta and great vessels are unremarkable.  HEART: Heart size is normal. No pericardial effusion.  MEDIASTINUM AND GERTRUDE: No lymphadenopathy.  CHEST WALL AND LOWER NECK: Within normal limits.    ABDOMEN AND PELVIS:  LIVER: Withinnormal limits. Hepatic veins and portal vein are patent.  BILE DUCTS: Normal caliber.  GALLBLADDER: Mild pericholecystic fluid.  SPLEEN: Within normal limits.  PANCREAS: Again is noted mild edematous fullness of the pancreas with   mild peripancreatic stranding. No evidence of pancreatic necrosis. The   pancreatic duct is not dilated.  ADRENALS: Within normal limits.  KIDNEYS/URETERS: Moderate bilateral perinephric stranding. Otherwise,   within normal limits.    BLADDER: De Dios catheter in the collapsed bladder.  REPRODUCTIVE ORGANS: The prostate is not enlarged.    BOWEL: NG tube in the stomach. There is mild concentric wall thickening   of the entire colon with trace adjacent stranding. No bowel obstruction.   No pneumatosis or free air under the hemidiaphragms. Appendix is normal.   There is no mesenteric adenopathy. The mesenteric vessels opacify   unremarkably.  PERITONEUM: Trace free fluid in the peritoneal cavity. There is no   evidence of hyperattenuating collection within the peritoneal cavity or   retroperitoneum to suggest acute hemorrhage.  VESSELS: ECMO catheters in the left common femoral vein and right common   femoral artery.  RETROPERITONEUM/LYMPH NODES: No lymphadenopathy.  ABDOMINAL WALL: Within normal limits.  BONES: Within normal limits.    IMPRESSION:  1. ARDS.  2. Mild diffuse concentric wall thickening of the colon with adjacent   stranding, suggestive of pancolitis.  3. Mild pancreatitis. Mild pericholecystic fluid.  4. No evidence of intraperitoneal or retroperitoneal hemorrhage.    --- End of Report ---    ROBYN ALY MD; Attending Radiologist  This document has been electronically signed. 2022  6:28PM             HPI:  Patient is a 63 y/o male with PMH of CAD s/p CABG, DM, HTN, HLD presenting as transfer from [place] for c/f STEMI. PTA arrival received 325 aspirin, 600 plavix, and no heparin drip started. Around 1:30 am patient had multiple episodes of non-bloody diarrhea and emesis with associated abdominal pain and chest pain, unable to localize, non-radiating, with associated SOB and no associated palpitations or diaphoresis.     At [facility] ED on , HDS. WBC 20, T.bili 3.4, Lipase 300. RUQ US demonstrated gallbladder stones, pericholecystic fluid, no CBD dilation.   Patient continues to have rising troponins and respiratory status. Pt later went into PEA cardiac arrest on , ROSC achieved. Patient was started on ECMO. 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 LVEF worsening.    Pt currently intubated, sedated on pressors. GI consulted due to concern for gallstone pancreatitis. CT A/P IVC  shows ARDS, Mild diffuse concentric wall thickening of the colon with adjacent stranding, suggestive of pancolitis. Mild pancreatitis. Mild pericholecystic fluid. Normal caliber bile ducts.     Allergies:  No Known Allergies    Home Medications:  aspirin 81 mg oral tablet: 1 tab(s) orally once a day (2022 12:50)  carvedilol 6.25 mg oral tablet: 1 tab(s) orally 2 times a day (2022 12:50)  HumuLIN 70/30 subcutaneous suspension: 18 unit(s) subcutaneous once a day (2022 12:50)  HumuLIN 70/30 subcutaneous suspension: 16 unit(s) subcutaneous once a day (at bedtime) (2022 12:50)  indapamide: 1.5 milligram(s) orally once a day (2022 12:50)  metFORMIN 1000 mg oral tablet, extended release: 1 tab(s) orally once a day (2022 12:51)  omeprazole 20 mg oral delayed release capsule: 1 cap(s) orally once a day (2022 12:50)  ramipril 2.5 mg oral tablet: 1 tab(s) orally once a day (2022 12:50)  rivaroxaban 2.5 mg oral tablet: 1 tab(s) orally once a day (2022 12:50)  rosuvastatin 10 mg oral tablet: 1 tab(s) orally once a day (2022 12:50)    Hospital Medications:  chlorhexidine 0.12% Liquid 15 milliLiter(s) Oral Mucosa every 12 hours  chlorhexidine 2% Cloths 1 Application(s) Topical <User Schedule>  chlorhexidine 4% Liquid 1 Application(s) Topical <User Schedule>  dexMEDEtomidine Infusion 1.5 MICROgram(s)/kG/Hr IV Continuous <Continuous>  dextrose 50% Injectable 50 milliLiter(s) IV Push every 15 minutes  EPINEPHrine    Infusion 0.07 MICROgram(s)/kG/Min IV Continuous <Continuous>  furosemide Infusion 2.5 mG/Hr IV Continuous <Continuous>  heparin  Infusion 500 Unit(s)/Hr IV Continuous <Continuous>  hydrocortisone sodium succinate Injectable 100 milliGRAM(s) IV Push every 8 hours  insulin lispro (ADMELOG) corrective regimen sliding scale   SubCutaneous every 6 hours  insulin regular Infusion 6 Unit(s)/Hr IV Continuous <Continuous>  meropenem  IVPB 1000 milliGRAM(s) IV Intermittent every 12 hours  norepinephrine Infusion 0.05 MICROgram(s)/kG/Min IV Continuous <Continuous>  pantoprazole  Injectable 40 milliGRAM(s) IV Push two times a day  propofol Infusion 10 MICROgram(s)/kG/Min IV Continuous <Continuous>  sodium chloride 0.9% lock flush 10 milliLiter(s) IV Push every 1 hour PRN  sodium chloride 0.9%. 1000 milliLiter(s) IV Continuous <Continuous>  vasopressin Infusion 0.1 Unit(s)/Min IV Continuous <Continuous>    PMHX/PSHX:      Family history:  N/A    Social History:   Tob: Denies  EtOH: Denies  Illicit Drugs: Denies    ROS:   Unable to obtain - intubated/sedated    PHYSICAL EXAM:     GENERAL:  No acute distress  HEENT:  NCAT, no scleral icterus   CHEST:  no respiratory distress  HEART:  Regular rate and rhythm  ABDOMEN:  Soft, non-tender, non-distended, normoactive bowel sounds,  no masses  EXTREMITIES: No edema  SKIN:  No rash/erythema/ecchymoses/petechiae/wounds/abscess/warm/dry  NEURO:  Alert and oriented x 3, no asterixis    Vital Signs:  Vital Signs Last 24 Hrs  T(C): 36.8 (2022 16:00), Max: 36.8 (2022 08:00)  T(F): 98.2 (2022 16:00), Max: 98.2 (2022 08:00)  HR: 85 (2022 16:15) (59 - 148)  BP: --  BP(mean): --  RR: 18 (2022 15:45) (0 - 30)  SpO2: 100% (2022 16:15) (75% - 100%)    Parameters below as of 2022 16:00  Patient On (Oxygen Delivery Method): ventilator    O2 Concentration (%): 40  Daily     Daily     LABS:                        9.8    18.25 )-----------( 107      ( 2022 01:53 )             28.8     Mean Cell Volume: 87.8 fl (22 @ 01:53)        151<H>  |  101  |  39<H>  ----------------------------<  271<H>  3.1<L>   |  25  |  2.83<H>    Ca    9.8      2022 01:53  Phos  2.6       Mg     2.7         TPro  5.2<L>  /  Alb  3.2<L>  /  TBili  2.4<H>  /  DBili  x   /  AST  721<H>  /  ALT  354<H>  /  AlkPhos  56      LIVER FUNCTIONS - ( 2022 01:53 )  Alb: 3.2 g/dL / Pro: 5.2 g/dL / ALK PHOS: 56 U/L / ALT: 354 U/L / AST: 721 U/L / GGT: x           PT/INR - ( 2022 01:45 )   PT: 17.2 sec;   INR: 1.48 ratio         PTT - ( 2022 01:45 )  PTT:37.2 sec  Urinalysis Basic - ( 2022 10:37 )    Color: Yellow / Appearance: Slightly Turbid / S.027 / pH: x  Gluc: x / Ketone: Trace  / Bili: Negative / Urobili: Negative   Blood: x / Protein: 30 mg/dL / Nitrite: Negative   Leuk Esterase: Negative / RBC: 7 /hpf / WBC 7 /HPF   Sq Epi: x / Non Sq Epi: 2 /hpf / Bacteria: Negative      Amylase [id number]      Lipase [id number]       Ammonia--  Amylase Serum--      Lipase serum--       [id number]                          9.8    18.25 )-----------( 107      ( 2022 01:53 )             28.8                         12.2   20.38 )-----------( 158      ( 2022 20:04 )             36.0                         10.6   18.55 )-----------( 161      ( 2022 18:27 )             32.4                         9.9    19.42 )-----------( 141      ( 2022 16:11 )             29.6                         12.3   16.88 )-----------( 195      ( 2022 11:52 )             36.9       Imaging:  ACC: [id number] EXAM:  CT CHEST IC                        ACC: [id number] EXAM:  CT ABDOMEN AND PELVIS IC                          PROCEDURE DATE:  2022      INTERPRETATION:  CLINICAL INFORMATION: Code.    COMPARISON: Noncontrast CT of the abdomen and pelvis 2022.    CONTRAST/COMPLICATIONS:  IV Contrast: IV contrast documented in associated exam (accession   [id number]), Omnipaque 350 (accession [id number])  90 cc administered   10 cc   discarded  Oral Contrast: NONE  Complications: None reported at time of study completion    PROCEDURE:  CT of the Chest, Abdomen and Pelvis was performed.  Sagittal and coronal reformats were performed.    FINDINGS:  CHEST:  LUNGS AND LARGE AIRWAYS: Endotracheal tube in place. Patent central   airways. Extensive bilateral perihilar consolidation and patchy   groundglass opacities in the upper lobes.  PLEURA: Trace bilateral layering pleural effusions. No pneumothorax or   pneumomediastinum.  VESSELS: Right internal jugular central venous catheter. The thoracic   aorta and great vessels are unremarkable.  HEART: Heart size is normal. No pericardial effusion.  MEDIASTINUM AND GERTRUDE: No lymphadenopathy.  CHEST WALL AND LOWER NECK: Within normal limits.    ABDOMEN AND PELVIS:  LIVER: Withinnormal limits. Hepatic veins and portal vein are patent.  BILE DUCTS: Normal caliber.  GALLBLADDER: Mild pericholecystic fluid.  SPLEEN: Within normal limits.  PANCREAS: Again is noted mild edematous fullness of the pancreas with   mild peripancreatic stranding. No evidence of pancreatic necrosis. The   pancreatic duct is not dilated.  ADRENALS: Within normal limits.  KIDNEYS/URETERS: Moderate bilateral perinephric stranding. Otherwise,   within normal limits.    BLADDER: De Dios catheter in the collapsed bladder.  REPRODUCTIVE ORGANS: The prostate is not enlarged.    BOWEL: NG tube in the stomach. There is mild concentric wall thickening   of the entire colon with trace adjacent stranding. No bowel obstruction.   No pneumatosis or free air under the hemidiaphragms. Appendix is normal.   There is no mesenteric adenopathy. The mesenteric vessels opacify   unremarkably.  PERITONEUM: Trace free fluid in the peritoneal cavity. There is no   evidence of hyperattenuating collection within the peritoneal cavity or   retroperitoneum to suggest acute hemorrhage.  VESSELS: ECMO catheters in the left common femoral vein and right common   femoral artery.  RETROPERITONEUM/LYMPH NODES: No lymphadenopathy.  ABDOMINAL WALL: Within normal limits.  BONES: Within normal limits.    IMPRESSION:  1. ARDS.  2. Mild diffuse concentric wall thickening of the colon with adjacent   stranding, suggestive of pancolitis.  3. Mild pancreatitis. Mild pericholecystic fluid.  4. No evidence of intraperitoneal or retroperitoneal hemorrhage.    --- End of Report ---    ROBYN ALY MD; Attending Radiologist  This document has been electronically signed. 2022  6:28PM             HPI:  Patient is a 63 y/o male with PMH of CAD s/p CABG, DM, HTN, HLD presenting as transfer from [place] for c/f STEMI. PTA arrival received 325 aspirin, 600 plavix, and no heparin drip started. Around 1:30 am patient had multiple episodes of non-bloody diarrhea and emesis with associated abdominal pain and chest pain, unable to localize, non-radiating, with associated SOB and no associated palpitations or diaphoresis.     At [facility] ED on , HDS. WBC 20, T.bili 3.4, Lipase 300. RUQ US demonstrated gallbladder stones, pericholecystic fluid, no CBD dilation.   Patient continues to have rising troponins and respiratory status. Pt later went into PEA cardiac arrest on , ROSC achieved. Patient was started on ECMO. 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 LVEF worsening.    Pt currently intubated, sedated on pressors. GI consulted due to concern for gallstone pancreatitis. CT A/P IVC  shows ARDS, Mild diffuse concentric wall thickening of the colon with adjacent stranding, suggestive of pancolitis. Mild pancreatitis. Mild pericholecystic fluid. Normal caliber bile ducts.     Allergies:  No Known Allergies    Home Medications:  aspirin 81 mg oral tablet: 1 tab(s) orally once a day (2022 12:50)  carvedilol 6.25 mg oral tablet: 1 tab(s) orally 2 times a day (2022 12:50)  HumuLIN 70/30 subcutaneous suspension: 18 unit(s) subcutaneous once a day (2022 12:50)  HumuLIN 70/30 subcutaneous suspension: 16 unit(s) subcutaneous once a day (at bedtime) (2022 12:50)  indapamide: 1.5 milligram(s) orally once a day (2022 12:50)  metFORMIN 1000 mg oral tablet, extended release: 1 tab(s) orally once a day (2022 12:51)  omeprazole 20 mg oral delayed release capsule: 1 cap(s) orally once a day (2022 12:50)  ramipril 2.5 mg oral tablet: 1 tab(s) orally once a day (2022 12:50)  rivaroxaban 2.5 mg oral tablet: 1 tab(s) orally once a day (2022 12:50)  rosuvastatin 10 mg oral tablet: 1 tab(s) orally once a day (2022 12:50)    Hospital Medications:  chlorhexidine 0.12% Liquid 15 milliLiter(s) Oral Mucosa every 12 hours  chlorhexidine 2% Cloths 1 Application(s) Topical <User Schedule>  chlorhexidine 4% Liquid 1 Application(s) Topical <User Schedule>  dexMEDEtomidine Infusion 1.5 MICROgram(s)/kG/Hr IV Continuous <Continuous>  dextrose 50% Injectable 50 milliLiter(s) IV Push every 15 minutes  EPINEPHrine    Infusion 0.07 MICROgram(s)/kG/Min IV Continuous <Continuous>  furosemide Infusion 2.5 mG/Hr IV Continuous <Continuous>  heparin  Infusion 500 Unit(s)/Hr IV Continuous <Continuous>  hydrocortisone sodium succinate Injectable 100 milliGRAM(s) IV Push every 8 hours  insulin lispro (ADMELOG) corrective regimen sliding scale   SubCutaneous every 6 hours  insulin regular Infusion 6 Unit(s)/Hr IV Continuous <Continuous>  meropenem  IVPB 1000 milliGRAM(s) IV Intermittent every 12 hours  norepinephrine Infusion 0.05 MICROgram(s)/kG/Min IV Continuous <Continuous>  pantoprazole  Injectable 40 milliGRAM(s) IV Push two times a day  propofol Infusion 10 MICROgram(s)/kG/Min IV Continuous <Continuous>  sodium chloride 0.9% lock flush 10 milliLiter(s) IV Push every 1 hour PRN  sodium chloride 0.9%. 1000 milliLiter(s) IV Continuous <Continuous>  vasopressin Infusion 0.1 Unit(s)/Min IV Continuous <Continuous>    PMHX/PSHX:      Family history:  N/A    Social History:   Tob: Denies  EtOH: Denies  Illicit Drugs: Denies    ROS:   Unable to obtain - intubated/sedated    PHYSICAL EXAM:     GENERAL:  No acute distress  HEENT:  NCAT, no scleral icterus   CHEST:  no respiratory distress  HEART:  Regular rate and rhythm  ABDOMEN:  Soft, non-tender, non-distended, normoactive bowel sounds,  no masses  EXTREMITIES: No edema  SKIN:  No rash/erythema/ecchymoses/petechiae/wounds/abscess/warm/dry  NEURO:  Alert and oriented x 3, no asterixis    Vital Signs:  Vital Signs Last 24 Hrs  T(C): 36.8 (2022 16:00), Max: 36.8 (2022 08:00)  T(F): 98.2 (2022 16:00), Max: 98.2 (2022 08:00)  HR: 85 (2022 16:15) (59 - 148)  BP: --  BP(mean): --  RR: 18 (2022 15:45) (0 - 30)  SpO2: 100% (2022 16:15) (75% - 100%)    Parameters below as of 2022 16:00  Patient On (Oxygen Delivery Method): ventilator    O2 Concentration (%): 40  Daily     Daily     LABS:                        9.8    18.25 )-----------( 107      ( 2022 01:53 )             28.8     Mean Cell Volume: 87.8 fl (22 @ 01:53)        151<H>  |  101  |  39<H>  ----------------------------<  271<H>  3.1<L>   |  25  |  2.83<H>    Ca    9.8      2022 01:53  Phos  2.6       Mg     2.7         TPro  5.2<L>  /  Alb  3.2<L>  /  TBili  2.4<H>  /  DBili  x   /  AST  721<H>  /  ALT  354<H>  /  AlkPhos  56      LIVER FUNCTIONS - ( 2022 01:53 )  Alb: 3.2 g/dL / Pro: 5.2 g/dL / ALK PHOS: 56 U/L / ALT: 354 U/L / AST: 721 U/L / GGT: x           PT/INR - ( 2022 01:45 )   PT: 17.2 sec;   INR: 1.48 ratio         PTT - ( 2022 01:45 )  PTT:37.2 sec  Urinalysis Basic - ( 2022 10:37 )    Color: Yellow / Appearance: Slightly Turbid / S.027 / pH: x  Gluc: x / Ketone: Trace  / Bili: Negative / Urobili: Negative   Blood: x / Protein: 30 mg/dL / Nitrite: Negative   Leuk Esterase: Negative / RBC: 7 /hpf / WBC 7 /HPF   Sq Epi: x / Non Sq Epi: 2 /hpf / Bacteria: Negative      Amylase [id number]      Lipase [id number]       Ammonia--  Amylase Serum--      Lipase serum--       [id number]                          9.8    18.25 )-----------( 107      ( 2022 01:53 )             28.8                         12.2   20.38 )-----------( 158      ( 2022 20:04 )             36.0                         10.6   18.55 )-----------( 161      ( 2022 18:27 )             32.4                         9.9    19.42 )-----------( 141      ( 2022 16:11 )             29.6                         12.3   16.88 )-----------( 195      ( 2022 11:52 )             36.9       Imaging:  ACC: [id number] EXAM:  CT CHEST IC                        ACC: [id number] EXAM:  CT ABDOMEN AND PELVIS IC                          PROCEDURE DATE:  2022      INTERPRETATION:  CLINICAL INFORMATION: Code.    COMPARISON: Noncontrast CT of the abdomen and pelvis 2022.    CONTRAST/COMPLICATIONS:  IV Contrast: IV contrast documented in associated exam (accession   [id number]), Omnipaque 350 (accession [id number])  90 cc administered   10 cc   discarded  Oral Contrast: NONE  Complications: None reported at time of study completion    PROCEDURE:  CT of the Chest, Abdomen and Pelvis was performed.  Sagittal and coronal reformats were performed.    FINDINGS:  CHEST:  LUNGS AND LARGE AIRWAYS: Endotracheal tube in place. Patent central   airways. Extensive bilateral perihilar consolidation and patchy   groundglass opacities in the upper lobes.  PLEURA: Trace bilateral layering pleural effusions. No pneumothorax or   pneumomediastinum.  VESSELS: Right internal jugular central venous catheter. The thoracic   aorta and great vessels are unremarkable.  HEART: Heart size is normal. No pericardial effusion.  MEDIASTINUM AND GERTRUDE: No lymphadenopathy.  CHEST WALL AND LOWER NECK: Within normal limits.    ABDOMEN AND PELVIS:  LIVER: Withinnormal limits. Hepatic veins and portal vein are patent.  BILE DUCTS: Normal caliber.  GALLBLADDER: Mild pericholecystic fluid.  SPLEEN: Within normal limits.  PANCREAS: Again is noted mild edematous fullness of the pancreas with   mild peripancreatic stranding. No evidence of pancreatic necrosis. The   pancreatic duct is not dilated.  ADRENALS: Within normal limits.  KIDNEYS/URETERS: Moderate bilateral perinephric stranding. Otherwise,   within normal limits.    BLADDER: De Dios catheter in the collapsed bladder.  REPRODUCTIVE ORGANS: The prostate is not enlarged.    BOWEL: NG tube in the stomach. There is mild concentric wall thickening   of the entire colon with trace adjacent stranding. No bowel obstruction.   No pneumatosis or free air under the hemidiaphragms. Appendix is normal.   There is no mesenteric adenopathy. The mesenteric vessels opacify   unremarkably.  PERITONEUM: Trace free fluid in the peritoneal cavity. There is no   evidence of hyperattenuating collection within the peritoneal cavity or   retroperitoneum to suggest acute hemorrhage.  VESSELS: ECMO catheters in the left common femoral vein and right common   femoral artery.  RETROPERITONEUM/LYMPH NODES: No lymphadenopathy.  ABDOMINAL WALL: Within normal limits.  BONES: Within normal limits.    IMPRESSION:  1. ARDS.  2. Mild diffuse concentric wall thickening of the colon with adjacent   stranding, suggestive of pancolitis.  3. Mild pancreatitis. Mild pericholecystic fluid.  4. No evidence of intraperitoneal or retroperitoneal hemorrhage.    --- End of Report ---    ROBYN ALY MD; Attending Radiologist  This document has been electronically signed. 2022  6:28PM             HPI:  Patient is a 61 y/o male with PMH of CAD s/p CABG, DM, HTN, HLD presenting as transfer from [place] for c/f STEMI. PTA arrival received 325 aspirin, 600 plavix, and no heparin drip started. Around 1:30 am patient had multiple episodes of non-bloody diarrhea and emesis with associated abdominal pain and chest pain, unable to localize, non-radiating, with associated SOB and no associated palpitations or diaphoresis.     At [facility] ED on , HDS. WBC 20, T.bili 3.4, Lipase 300. RUQ US demonstrated gallbladder stones, pericholecystic fluid, no CBD dilation.   Patient continues to have rising troponins and respiratory status. Pt later went into PEA cardiac arrest on , ROSC achieved. Patient was started on ECMO. 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 LVEF worsening.    Pt currently intubated, sedated on pressors. GI consulted due to concern for gallstone pancreatitis. CT A/P IVC  shows ARDS, Mild diffuse concentric wall thickening of the colon with adjacent stranding, suggestive of pancolitis. Mild pancreatitis. Mild pericholecystic fluid. Normal caliber bile ducts.     Allergies:  No Known Allergies    Home Medications:  aspirin 81 mg oral tablet: 1 tab(s) orally once a day (2022 12:50)  carvedilol 6.25 mg oral tablet: 1 tab(s) orally 2 times a day (2022 12:50)  HumuLIN 70/30 subcutaneous suspension: 18 unit(s) subcutaneous once a day (2022 12:50)  HumuLIN 70/30 subcutaneous suspension: 16 unit(s) subcutaneous once a day (at bedtime) (2022 12:50)  indapamide: 1.5 milligram(s) orally once a day (2022 12:50)  metFORMIN 1000 mg oral tablet, extended release: 1 tab(s) orally once a day (2022 12:51)  omeprazole 20 mg oral delayed release capsule: 1 cap(s) orally once a day (2022 12:50)  ramipril 2.5 mg oral tablet: 1 tab(s) orally once a day (2022 12:50)  rivaroxaban 2.5 mg oral tablet: 1 tab(s) orally once a day (2022 12:50)  rosuvastatin 10 mg oral tablet: 1 tab(s) orally once a day (2022 12:50)    Hospital Medications:  chlorhexidine 0.12% Liquid 15 milliLiter(s) Oral Mucosa every 12 hours  chlorhexidine 2% Cloths 1 Application(s) Topical <User Schedule>  chlorhexidine 4% Liquid 1 Application(s) Topical <User Schedule>  dexMEDEtomidine Infusion 1.5 MICROgram(s)/kG/Hr IV Continuous <Continuous>  dextrose 50% Injectable 50 milliLiter(s) IV Push every 15 minutes  EPINEPHrine    Infusion 0.07 MICROgram(s)/kG/Min IV Continuous <Continuous>  furosemide Infusion 2.5 mG/Hr IV Continuous <Continuous>  heparin  Infusion 500 Unit(s)/Hr IV Continuous <Continuous>  hydrocortisone sodium succinate Injectable 100 milliGRAM(s) IV Push every 8 hours  insulin lispro (ADMELOG) corrective regimen sliding scale   SubCutaneous every 6 hours  insulin regular Infusion 6 Unit(s)/Hr IV Continuous <Continuous>  meropenem  IVPB 1000 milliGRAM(s) IV Intermittent every 12 hours  norepinephrine Infusion 0.05 MICROgram(s)/kG/Min IV Continuous <Continuous>  pantoprazole  Injectable 40 milliGRAM(s) IV Push two times a day  propofol Infusion 10 MICROgram(s)/kG/Min IV Continuous <Continuous>  sodium chloride 0.9% lock flush 10 milliLiter(s) IV Push every 1 hour PRN  sodium chloride 0.9%. 1000 milliLiter(s) IV Continuous <Continuous>  vasopressin Infusion 0.1 Unit(s)/Min IV Continuous <Continuous>    PMHX/PSHX:      Family history:  N/A    Social History:   Tob: Denies  EtOH: Denies  Illicit Drugs: Denies    ROS:   Unable to obtain - intubated/sedated    PHYSICAL EXAM:     GENERAL:  intubated, sedated  HEENT:  NCAT   CHEST:  no respiratory distress  HEART:  Regular rate and rhythm  ABDOMEN:  Soft, non-tender, non-distended   EXTREMITIES: No edema  SKIN:  No rash/erythema/ecchymoses   NEURO:  Alert and oriented x 0    Vital Signs:  Vital Signs Last 24 Hrs  T(C): 36.8 (2022 16:00), Max: 36.8 (2022 08:00)  T(F): 98.2 (2022 16:00), Max: 98.2 (2022 08:00)  HR: 85 (2022 16:15) (59 - 148)  BP: --  BP(mean): --  RR: 18 (2022 15:45) (0 - 30)  SpO2: 100% (2022 16:15) (75% - 100%)    Parameters below as of 2022 16:00  Patient On (Oxygen Delivery Method): ventilator    O2 Concentration (%): 40  Daily     Daily     LABS:                        9.8    18.25 )-----------( 107      ( 2022 01:53 )             28.8     Mean Cell Volume: 87.8 fl (- @ 01:53)        151<H>  |  101  |  39<H>  ----------------------------<  271<H>  3.1<L>   |  25  |  2.83<H>    Ca    9.8      2022 01:53  Phos  2.6       Mg     2.7         TPro  5.2<L>  /  Alb  3.2<L>  /  TBili  2.4<H>  /  DBili  x   /  AST  721<H>  /  ALT  354<H>  /  AlkPhos  56      LIVER FUNCTIONS - ( 2022 01:53 )  Alb: 3.2 g/dL / Pro: 5.2 g/dL / ALK PHOS: 56 U/L / ALT: 354 U/L / AST: 721 U/L / GGT: x           PT/INR - ( 2022 01:45 )   PT: 17.2 sec;   INR: 1.48 ratio         PTT - ( 2022 01:45 )  PTT:37.2 sec  Urinalysis Basic - ( 2022 10:37 )    Color: Yellow / Appearance: Slightly Turbid / S.027 / pH: x  Gluc: x / Ketone: Trace  / Bili: Negative / Urobili: Negative   Blood: x / Protein: 30 mg/dL / Nitrite: Negative   Leuk Esterase: Negative / RBC: 7 /hpf / WBC 7 /HPF   Sq Epi: x / Non Sq Epi: 2 /hpf / Bacteria: Negative      Amylase [id number]      Lipase [id number]       Ammonia--  Amylase Serum--      Lipase serum--       [id number]                          9.8    18.25 )-----------( 107      ( 2022 01:53 )             28.8                         12.2   20.38 )-----------( 158      ( 2022 20:04 )             36.0                         10.6   18.55 )-----------( 161      ( 2022 18:27 )             32.4                         9.9    19.42 )-----------( 141      ( 2022 16:11 )             29.6                         12.3   16.88 )-----------( 195      ( 2022 11:52 )             36.9       Imaging:  ACC: [id number] EXAM:  CT CHEST IC                        ACC: [id number] EXAM:  CT ABDOMEN AND PELVIS IC                          PROCEDURE DATE:  2022      INTERPRETATION:  CLINICAL INFORMATION: Code.    COMPARISON: Noncontrast CT of the abdomen and pelvis 2022.    CONTRAST/COMPLICATIONS:  IV Contrast: IV contrast documented in associated exam (accession   [id number]), Omnipaque 350 (accession [id number])  90 cc administered   10 cc   discarded  Oral Contrast: NONE  Complications: None reported at time of study completion    PROCEDURE:  CT of the Chest, Abdomen and Pelvis was performed.  Sagittal and coronal reformats were performed.    FINDINGS:  CHEST:  LUNGS AND LARGE AIRWAYS: Endotracheal tube in place. Patent central   airways. Extensive bilateral perihilar consolidation and patchy   groundglass opacities in the upper lobes.  PLEURA: Trace bilateral layering pleural effusions. No pneumothorax or   pneumomediastinum.  VESSELS: Right internal jugular central venous catheter. The thoracic   aorta and great vessels are unremarkable.  HEART: Heart size is normal. No pericardial effusion.  MEDIASTINUM AND GERTRUDE: No lymphadenopathy.  CHEST WALL AND LOWER NECK: Within normal limits.    ABDOMEN AND PELVIS:  LIVER: Withinnormal limits. Hepatic veins and portal vein are patent.  BILE DUCTS: Normal caliber.  GALLBLADDER: Mild pericholecystic fluid.  SPLEEN: Within normal limits.  PANCREAS: Again is noted mild edematous fullness of the pancreas with   mild peripancreatic stranding. No evidence of pancreatic necrosis. The   pancreatic duct is not dilated.  ADRENALS: Within normal limits.  KIDNEYS/URETERS: Moderate bilateral perinephric stranding. Otherwise,   within normal limits.    BLADDER: De Dios catheter in the collapsed bladder.  REPRODUCTIVE ORGANS: The prostate is not enlarged.    BOWEL: NG tube in the stomach. There is mild concentric wall thickening   of the entire colon with trace adjacent stranding. No bowel obstruction.   No pneumatosis or free air under the hemidiaphragms. Appendix is normal.   There is no mesenteric adenopathy. The mesenteric vessels opacify   unremarkably.  PERITONEUM: Trace free fluid in the peritoneal cavity. There is no   evidence of hyperattenuating collection within the peritoneal cavity or   retroperitoneum to suggest acute hemorrhage.  VESSELS: ECMO catheters in the left common femoral vein and right common   femoral artery.  RETROPERITONEUM/LYMPH NODES: No lymphadenopathy.  ABDOMINAL WALL: Within normal limits.  BONES: Within normal limits.    IMPRESSION:  1. ARDS.  2. Mild diffuse concentric wall thickening of the colon with adjacent   stranding, suggestive of pancolitis.  3. Mild pancreatitis. Mild pericholecystic fluid.  4. No evidence of intraperitoneal or retroperitoneal hemorrhage.    --- End of Report ---    ROBYN ALY MD; Attending Radiologist  This document has been electronically signed. 2022  6:28PM             HPI:  Patient is a 61 y/o male with PMH of CAD s/p CABG, DM, HTN, HLD presenting as transfer from [place] for c/f STEMI. PTA arrival received 325 aspirin, 600 plavix, and no heparin drip started. Around 1:30 am patient had multiple episodes of non-bloody diarrhea and emesis with associated abdominal pain and chest pain, unable to localize, non-radiating, with associated SOB and no associated palpitations or diaphoresis.     At [facility] ED on , HDS. WBC 20, T.bili 3.4, Lipase 300. RUQ US demonstrated gallbladder stones, pericholecystic fluid, no CBD dilation.   Patient continues to have rising troponins and respiratory status. Pt later went into PEA cardiac arrest on , ROSC achieved. Patient was started on ECMO. 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 LVEF worsening.    Pt currently intubated, sedated on pressors. GI consulted due to concern for gallstone pancreatitis. CT A/P IVC  shows ARDS, Mild diffuse concentric wall thickening of the colon with adjacent stranding, suggestive of pancolitis. Mild pancreatitis. Mild pericholecystic fluid. Normal caliber bile ducts.     Allergies:  No Known Allergies    Home Medications:  aspirin 81 mg oral tablet: 1 tab(s) orally once a day (2022 12:50)  carvedilol 6.25 mg oral tablet: 1 tab(s) orally 2 times a day (2022 12:50)  HumuLIN 70/30 subcutaneous suspension: 18 unit(s) subcutaneous once a day (2022 12:50)  HumuLIN 70/30 subcutaneous suspension: 16 unit(s) subcutaneous once a day (at bedtime) (2022 12:50)  indapamide: 1.5 milligram(s) orally once a day (2022 12:50)  metFORMIN 1000 mg oral tablet, extended release: 1 tab(s) orally once a day (2022 12:51)  omeprazole 20 mg oral delayed release capsule: 1 cap(s) orally once a day (2022 12:50)  ramipril 2.5 mg oral tablet: 1 tab(s) orally once a day (2022 12:50)  rivaroxaban 2.5 mg oral tablet: 1 tab(s) orally once a day (2022 12:50)  rosuvastatin 10 mg oral tablet: 1 tab(s) orally once a day (2022 12:50)    Hospital Medications:  chlorhexidine 0.12% Liquid 15 milliLiter(s) Oral Mucosa every 12 hours  chlorhexidine 2% Cloths 1 Application(s) Topical <User Schedule>  chlorhexidine 4% Liquid 1 Application(s) Topical <User Schedule>  dexMEDEtomidine Infusion 1.5 MICROgram(s)/kG/Hr IV Continuous <Continuous>  dextrose 50% Injectable 50 milliLiter(s) IV Push every 15 minutes  EPINEPHrine    Infusion 0.07 MICROgram(s)/kG/Min IV Continuous <Continuous>  furosemide Infusion 2.5 mG/Hr IV Continuous <Continuous>  heparin  Infusion 500 Unit(s)/Hr IV Continuous <Continuous>  hydrocortisone sodium succinate Injectable 100 milliGRAM(s) IV Push every 8 hours  insulin lispro (ADMELOG) corrective regimen sliding scale   SubCutaneous every 6 hours  insulin regular Infusion 6 Unit(s)/Hr IV Continuous <Continuous>  meropenem  IVPB 1000 milliGRAM(s) IV Intermittent every 12 hours  norepinephrine Infusion 0.05 MICROgram(s)/kG/Min IV Continuous <Continuous>  pantoprazole  Injectable 40 milliGRAM(s) IV Push two times a day  propofol Infusion 10 MICROgram(s)/kG/Min IV Continuous <Continuous>  sodium chloride 0.9% lock flush 10 milliLiter(s) IV Push every 1 hour PRN  sodium chloride 0.9%. 1000 milliLiter(s) IV Continuous <Continuous>  vasopressin Infusion 0.1 Unit(s)/Min IV Continuous <Continuous>    PMHX/PSHX:      Family history:  N/A    Social History:   Tob: Denies  EtOH: Denies  Illicit Drugs: Denies    ROS:   Unable to obtain - intubated/sedated    PHYSICAL EXAM:     GENERAL:  intubated, sedated  HEENT:  NCAT   CHEST:  no respiratory distress  HEART:  Regular rate and rhythm  ABDOMEN:  Soft, non-tender, non-distended   EXTREMITIES: No edema  SKIN:  No rash/erythema/ecchymoses   NEURO:  Alert and oriented x 0    Vital Signs:  Vital Signs Last 24 Hrs  T(C): 36.8 (2022 16:00), Max: 36.8 (2022 08:00)  T(F): 98.2 (2022 16:00), Max: 98.2 (2022 08:00)  HR: 85 (2022 16:15) (59 - 148)  BP: --  BP(mean): --  RR: 18 (2022 15:45) (0 - 30)  SpO2: 100% (2022 16:15) (75% - 100%)    Parameters below as of 2022 16:00  Patient On (Oxygen Delivery Method): ventilator    O2 Concentration (%): 40  Daily     Daily     LABS:                        9.8    18.25 )-----------( 107      ( 2022 01:53 )             28.8     Mean Cell Volume: 87.8 fl (- @ 01:53)        151<H>  |  101  |  39<H>  ----------------------------<  271<H>  3.1<L>   |  25  |  2.83<H>    Ca    9.8      2022 01:53  Phos  2.6       Mg     2.7         TPro  5.2<L>  /  Alb  3.2<L>  /  TBili  2.4<H>  /  DBili  x   /  AST  721<H>  /  ALT  354<H>  /  AlkPhos  56      LIVER FUNCTIONS - ( 2022 01:53 )  Alb: 3.2 g/dL / Pro: 5.2 g/dL / ALK PHOS: 56 U/L / ALT: 354 U/L / AST: 721 U/L / GGT: x           PT/INR - ( 2022 01:45 )   PT: 17.2 sec;   INR: 1.48 ratio         PTT - ( 2022 01:45 )  PTT:37.2 sec  Urinalysis Basic - ( 2022 10:37 )    Color: Yellow / Appearance: Slightly Turbid / S.027 / pH: x  Gluc: x / Ketone: Trace  / Bili: Negative / Urobili: Negative   Blood: x / Protein: 30 mg/dL / Nitrite: Negative   Leuk Esterase: Negative / RBC: 7 /hpf / WBC 7 /HPF   Sq Epi: x / Non Sq Epi: 2 /hpf / Bacteria: Negative      Amylase [id number]      Lipase [id number]       Ammonia--  Amylase Serum--      Lipase serum--       [id number]                          9.8    18.25 )-----------( 107      ( 2022 01:53 )             28.8                         12.2   20.38 )-----------( 158      ( 2022 20:04 )             36.0                         10.6   18.55 )-----------( 161      ( 2022 18:27 )             32.4                         9.9    19.42 )-----------( 141      ( 2022 16:11 )             29.6                         12.3   16.88 )-----------( 195      ( 2022 11:52 )             36.9       Imaging:  ACC: [id number] EXAM:  CT CHEST IC                        ACC: [id number] EXAM:  CT ABDOMEN AND PELVIS IC                          PROCEDURE DATE:  2022      INTERPRETATION:  CLINICAL INFORMATION: Code.    COMPARISON: Noncontrast CT of the abdomen and pelvis 2022.    CONTRAST/COMPLICATIONS:  IV Contrast: IV contrast documented in associated exam (accession   [id number]), Omnipaque 350 (accession [id number])  90 cc administered   10 cc   discarded  Oral Contrast: NONE  Complications: None reported at time of study completion    PROCEDURE:  CT of the Chest, Abdomen and Pelvis was performed.  Sagittal and coronal reformats were performed.    FINDINGS:  CHEST:  LUNGS AND LARGE AIRWAYS: Endotracheal tube in place. Patent central   airways. Extensive bilateral perihilar consolidation and patchy   groundglass opacities in the upper lobes.  PLEURA: Trace bilateral layering pleural effusions. No pneumothorax or   pneumomediastinum.  VESSELS: Right internal jugular central venous catheter. The thoracic   aorta and great vessels are unremarkable.  HEART: Heart size is normal. No pericardial effusion.  MEDIASTINUM AND GERTRUDE: No lymphadenopathy.  CHEST WALL AND LOWER NECK: Within normal limits.    ABDOMEN AND PELVIS:  LIVER: Withinnormal limits. Hepatic veins and portal vein are patent.  BILE DUCTS: Normal caliber.  GALLBLADDER: Mild pericholecystic fluid.  SPLEEN: Within normal limits.  PANCREAS: Again is noted mild edematous fullness of the pancreas with   mild peripancreatic stranding. No evidence of pancreatic necrosis. The   pancreatic duct is not dilated.  ADRENALS: Within normal limits.  KIDNEYS/URETERS: Moderate bilateral perinephric stranding. Otherwise,   within normal limits.    BLADDER: De Dios catheter in the collapsed bladder.  REPRODUCTIVE ORGANS: The prostate is not enlarged.    BOWEL: NG tube in the stomach. There is mild concentric wall thickening   of the entire colon with trace adjacent stranding. No bowel obstruction.   No pneumatosis or free air under the hemidiaphragms. Appendix is normal.   There is no mesenteric adenopathy. The mesenteric vessels opacify   unremarkably.  PERITONEUM: Trace free fluid in the peritoneal cavity. There is no   evidence of hyperattenuating collection within the peritoneal cavity or   retroperitoneum to suggest acute hemorrhage.  VESSELS: ECMO catheters in the left common femoral vein and right common   femoral artery.  RETROPERITONEUM/LYMPH NODES: No lymphadenopathy.  ABDOMINAL WALL: Within normal limits.  BONES: Within normal limits.    IMPRESSION:  1. ARDS.  2. Mild diffuse concentric wall thickening of the colon with adjacent   stranding, suggestive of pancolitis.  3. Mild pancreatitis. Mild pericholecystic fluid.  4. No evidence of intraperitoneal or retroperitoneal hemorrhage.    --- End of Report ---    ROBYN ALY MD; Attending Radiologist  This document has been electronically signed. 2022  6:28PM             HPI:  Patient is a 61 y/o male with PMH of CAD s/p CABG, DM, HTN, HLD presenting as transfer from [place] for c/f STEMI. PTA arrival received 325 aspirin, 600 plavix, and no heparin drip started. Around 1:30 am patient had multiple episodes of non-bloody diarrhea and emesis with associated abdominal pain and chest pain, unable to localize, non-radiating, with associated SOB and no associated palpitations or diaphoresis.     At [facility] ED on , HDS. WBC 20, T.bili 3.4, Lipase 300. RUQ US demonstrated gallbladder stones, pericholecystic fluid, no CBD dilation.   Patient continues to have rising troponins and respiratory status. Pt later went into PEA cardiac arrest on , ROSC achieved. Patient was started on ECMO. 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 LVEF worsening.    Pt currently intubated, sedated on pressors. GI consulted due to concern for gallstone pancreatitis. CT A/P IVC  shows ARDS, Mild diffuse concentric wall thickening of the colon with adjacent stranding, suggestive of pancolitis. Mild pancreatitis. Mild pericholecystic fluid. Normal caliber bile ducts.     Allergies:  No Known Allergies    Home Medications:  aspirin 81 mg oral tablet: 1 tab(s) orally once a day (2022 12:50)  carvedilol 6.25 mg oral tablet: 1 tab(s) orally 2 times a day (2022 12:50)  HumuLIN 70/30 subcutaneous suspension: 18 unit(s) subcutaneous once a day (2022 12:50)  HumuLIN 70/30 subcutaneous suspension: 16 unit(s) subcutaneous once a day (at bedtime) (2022 12:50)  indapamide: 1.5 milligram(s) orally once a day (2022 12:50)  metFORMIN 1000 mg oral tablet, extended release: 1 tab(s) orally once a day (2022 12:51)  omeprazole 20 mg oral delayed release capsule: 1 cap(s) orally once a day (2022 12:50)  ramipril 2.5 mg oral tablet: 1 tab(s) orally once a day (2022 12:50)  rivaroxaban 2.5 mg oral tablet: 1 tab(s) orally once a day (2022 12:50)  rosuvastatin 10 mg oral tablet: 1 tab(s) orally once a day (2022 12:50)    Hospital Medications:  chlorhexidine 0.12% Liquid 15 milliLiter(s) Oral Mucosa every 12 hours  chlorhexidine 2% Cloths 1 Application(s) Topical <User Schedule>  chlorhexidine 4% Liquid 1 Application(s) Topical <User Schedule>  dexMEDEtomidine Infusion 1.5 MICROgram(s)/kG/Hr IV Continuous <Continuous>  dextrose 50% Injectable 50 milliLiter(s) IV Push every 15 minutes  EPINEPHrine    Infusion 0.07 MICROgram(s)/kG/Min IV Continuous <Continuous>  furosemide Infusion 2.5 mG/Hr IV Continuous <Continuous>  heparin  Infusion 500 Unit(s)/Hr IV Continuous <Continuous>  hydrocortisone sodium succinate Injectable 100 milliGRAM(s) IV Push every 8 hours  insulin lispro (ADMELOG) corrective regimen sliding scale   SubCutaneous every 6 hours  insulin regular Infusion 6 Unit(s)/Hr IV Continuous <Continuous>  meropenem  IVPB 1000 milliGRAM(s) IV Intermittent every 12 hours  norepinephrine Infusion 0.05 MICROgram(s)/kG/Min IV Continuous <Continuous>  pantoprazole  Injectable 40 milliGRAM(s) IV Push two times a day  propofol Infusion 10 MICROgram(s)/kG/Min IV Continuous <Continuous>  sodium chloride 0.9% lock flush 10 milliLiter(s) IV Push every 1 hour PRN  sodium chloride 0.9%. 1000 milliLiter(s) IV Continuous <Continuous>  vasopressin Infusion 0.1 Unit(s)/Min IV Continuous <Continuous>    PMHX/PSHX:      Family history:  N/A    Social History:   Tob: Denies  EtOH: Denies  Illicit Drugs: Denies    ROS:   Unable to obtain - intubated/sedated    PHYSICAL EXAM:     GENERAL:  intubated, sedated  HEENT:  NCAT   CHEST:  no respiratory distress  HEART:  Regular rate and rhythm  ABDOMEN:  Soft, non-tender, non-distended   EXTREMITIES: No edema  SKIN:  No rash/erythema/ecchymoses   NEURO:  Alert and oriented x 0    Vital Signs:  Vital Signs Last 24 Hrs  T(C): 36.8 (2022 16:00), Max: 36.8 (2022 08:00)  T(F): 98.2 (2022 16:00), Max: 98.2 (2022 08:00)  HR: 85 (2022 16:15) (59 - 148)  BP: --  BP(mean): --  RR: 18 (2022 15:45) (0 - 30)  SpO2: 100% (2022 16:15) (75% - 100%)    Parameters below as of 2022 16:00  Patient On (Oxygen Delivery Method): ventilator    O2 Concentration (%): 40  Daily     Daily     LABS:                        9.8    18.25 )-----------( 107      ( 2022 01:53 )             28.8     Mean Cell Volume: 87.8 fl (- @ 01:53)        151<H>  |  101  |  39<H>  ----------------------------<  271<H>  3.1<L>   |  25  |  2.83<H>    Ca    9.8      2022 01:53  Phos  2.6       Mg     2.7         TPro  5.2<L>  /  Alb  3.2<L>  /  TBili  2.4<H>  /  DBili  x   /  AST  721<H>  /  ALT  354<H>  /  AlkPhos  56      LIVER FUNCTIONS - ( 2022 01:53 )  Alb: 3.2 g/dL / Pro: 5.2 g/dL / ALK PHOS: 56 U/L / ALT: 354 U/L / AST: 721 U/L / GGT: x           PT/INR - ( 2022 01:45 )   PT: 17.2 sec;   INR: 1.48 ratio         PTT - ( 2022 01:45 )  PTT:37.2 sec  Urinalysis Basic - ( 2022 10:37 )    Color: Yellow / Appearance: Slightly Turbid / S.027 / pH: x  Gluc: x / Ketone: Trace  / Bili: Negative / Urobili: Negative   Blood: x / Protein: 30 mg/dL / Nitrite: Negative   Leuk Esterase: Negative / RBC: 7 /hpf / WBC 7 /HPF   Sq Epi: x / Non Sq Epi: 2 /hpf / Bacteria: Negative      Amylase [id number]      Lipase [id number]       Ammonia--  Amylase Serum--      Lipase serum--       [id number]                          9.8    18.25 )-----------( 107      ( 2022 01:53 )             28.8                         12.2   20.38 )-----------( 158      ( 2022 20:04 )             36.0                         10.6   18.55 )-----------( 161      ( 2022 18:27 )             32.4                         9.9    19.42 )-----------( 141      ( 2022 16:11 )             29.6                         12.3   16.88 )-----------( 195      ( 2022 11:52 )             36.9       Imaging:  ACC: [id number] EXAM:  CT CHEST IC                        ACC: [id number] EXAM:  CT ABDOMEN AND PELVIS IC                          PROCEDURE DATE:  2022      INTERPRETATION:  CLINICAL INFORMATION: Code.    COMPARISON: Noncontrast CT of the abdomen and pelvis 2022.    CONTRAST/COMPLICATIONS:  IV Contrast: IV contrast documented in associated exam (accession   [id number]), Omnipaque 350 (accession [id number])  90 cc administered   10 cc   discarded  Oral Contrast: NONE  Complications: None reported at time of study completion    PROCEDURE:  CT of the Chest, Abdomen and Pelvis was performed.  Sagittal and coronal reformats were performed.    FINDINGS:  CHEST:  LUNGS AND LARGE AIRWAYS: Endotracheal tube in place. Patent central   airways. Extensive bilateral perihilar consolidation and patchy   groundglass opacities in the upper lobes.  PLEURA: Trace bilateral layering pleural effusions. No pneumothorax or   pneumomediastinum.  VESSELS: Right internal jugular central venous catheter. The thoracic   aorta and great vessels are unremarkable.  HEART: Heart size is normal. No pericardial effusion.  MEDIASTINUM AND GERTRUDE: No lymphadenopathy.  CHEST WALL AND LOWER NECK: Within normal limits.    ABDOMEN AND PELVIS:  LIVER: Withinnormal limits. Hepatic veins and portal vein are patent.  BILE DUCTS: Normal caliber.  GALLBLADDER: Mild pericholecystic fluid.  SPLEEN: Within normal limits.  PANCREAS: Again is noted mild edematous fullness of the pancreas with   mild peripancreatic stranding. No evidence of pancreatic necrosis. The   pancreatic duct is not dilated.  ADRENALS: Within normal limits.  KIDNEYS/URETERS: Moderate bilateral perinephric stranding. Otherwise,   within normal limits.    BLADDER: [place] catheter in the collapsed bladder.  REPRODUCTIVE ORGANS: The prostate is not enlarged.    BOWEL: NG tube in the stomach. There is mild concentric wall thickening   of the entire colon with trace adjacent stranding. No bowel obstruction.   No pneumatosis or free air under the hemidiaphragms. Appendix is normal.   There is no mesenteric adenopathy. The mesenteric vessels opacify   unremarkably.  PERITONEUM: Trace free fluid in the peritoneal cavity. There is no   evidence of hyperattenuating collection within the peritoneal cavity or   retroperitoneum to suggest acute hemorrhage.  VESSELS: ECMO catheters in the left common femoral vein and right common   femoral artery.  RETROPERITONEUM/LYMPH NODES: No lymphadenopathy.  ABDOMINAL WALL: Within normal limits.  BONES: Within normal limits.    IMPRESSION:  1. ARDS.  2. Mild diffuse concentric wall thickening of the colon with adjacent   stranding, suggestive of pancolitis.  3. Mild pancreatitis. Mild pericholecystic fluid.  4. No evidence of intraperitoneal or retroperitoneal hemorrhage.    --- End of Report ---    ROBYN ALY MD; Attending Radiologist  This document has been electronically signed. 2022  6:28PM

## 2022-11-26 NOTE — PROGRESS NOTE ADULT - ATTENDING COMMENTS
63 y/o man with CAD s/p CABG x4 in [facility] ~3yrs ago, T2DM, HTN, HLD, who presented initially to OSH with abdominal pain/nausea/vomiting/diarrhea found to have abnormal ECG and was transferred here for further evaluation in the setting of markedly elevated lipase and pancreatitis.  --S/p cannulation for ECMO 11/25 after cardiac arrest yesterday (ROSC after 30 min); intubated/sedated on ventilator.  --Management of AC/antiplatelet therapy in consultation with Neurosurgery per ICU Team given SDH noted on head CT.  Patient with h/o CAD s/p CABG, NSTEMI.  --Repeat ECGs regularly.  --Suggest CHF Team evaluation for advanced HF therapy management.  --Pancreatitis/cholecystitis management per surgical team.  --Will need eventual repeat TTE. 63 y/o man with CAD s/p CABG x4 in [facility] ~3yrs ago, T2DM, HTN, HLD, who presented initially to OSH with abdominal pain/nausea/vomiting/diarrhea found to have abnormal ECG and was transferred here for further evaluation in the setting of markedly elevated lipase and pancreatitis.  --S/p cannulation for ECMO 11/25 after cardiac arrest yesterday (ROSC after 30 min); intubated/sedated on ventilator.  --Management of AC/antiplatelet therapy in consultation with Neurosurgery per ICU Team given SDH noted on head CT.  Patient with h/o CAD s/p CABG, NSTEMI.  --Repeat ECGs regularly.  --Suggest CHF Team evaluation for advanced HF therapy management.  --Pancreatitis/cholecystitis management per surgical team.  --Will need eventual repeat TTE. 61 y/o man with CAD s/p CABG x4 in [facility] ~3yrs ago, T2DM, HTN, HLD, who presented initially to OSH with abdominal pain/nausea/vomiting/diarrhea found to have abnormal ECG and was transferred here for further evaluation in the setting of markedly elevated lipase and pancreatitis.  --S/p cannulation for ECMO 11/25 after cardiac arrest yesterday (ROSC after 30 min); intubated/sedated on ventilator.  --Management of AC/antiplatelet therapy in consultation with Neurosurgery per ICU Team given SDH noted on head CT.  Patient with h/o CAD s/p CABG, NSTEMI.  --Repeat ECGs regularly.  --Suggest CHF Team evaluation for advanced HF therapy management.  --Pancreatitis/cholecystitis management per surgical team.  --Will need eventual repeat TTE.

## 2022-11-26 NOTE — PROGRESS NOTE ADULT - ATTENDING COMMENTS
The patient is a 62 year old male with a medical history significant for CAD (s/p CABG ~3yrs ago), HTN and DM who presents in transfer from [place] for r/o STEMI. Labs were remarkable for a WBC of 20, troponin of 86, a TB of 3.4 and a lipase >3000. US revealed a thickened gallbladder and small stones. Pt was felt to have gallstone pancreatitis with demand ischemia.    At [facility], he received 325 aspirin, 600 plavix, and no heparin drip started. Additionally, the patient had multiple episodes of non-bloody diarrhea and emesis with associated abdominal/chest pain (he was unable to localize the pain). Pt fevers/chills, cough, rashes, or changes in urination. Pt was seen by cardiology. His EKG revealed ischemic changes. ASA was continued and he was started on heparin.    Patient had increasing troponins throughout the day and worsening respiratory status with rising lactate. Cardiology consult saw the patient earlier in the day and in consultation with multiple interventional cardiologists recommended medical management for NSTEMI. The sustained cardiac arrest and ACLS was initiated. CICU was and recommended a code ECMO. The ECMO team cannulated the patient and transferred him to the CTICU.    Pt was seen in follow up by the GI team and the pt was deemed too unstable for ERCP. No intrahepatic ductal dilation was present for a PTC. Medical management of gallstone pancreatitis was recommended.     A CT head was performed and revealed a tentorial subdural hemorrhage. No mass effect or midline shift was present. A 4 hour repeat CTH was grossly stable.    A/p  S/p cardiac arrest with global hypokinesis  Gallstone pancreatitis (TB and Lipase downtrending)  ARDS  ERIC  Suspected demand ischemia   Continue ECMO per CTS  GI Follow up appreciated  Too unstable for ERCP or MRCP   Continue NPO/IVF/IV abx .  Case D/w Dr. Zelaya The patient is a 62 year old male with a medical history significant for CAD (s/p CABG ~3yrs ago), HTN and DM who presents in transfer from [place] for r/o STEMI. Labs were remarkable for a WBC of 20, troponin of 86, a TB of 3.4 and a lipase >3000. US revealed a thickened gallbladder and small stones. Pt was felt to have gallstone pancreatitis with demand ischemia.    At [facility], he received 325 aspirin, 600 plavix, and no heparin drip started. Additionally, the patient had multiple episodes of non-bloody diarrhea and emesis with associated abdominal/chest pain (he was unable to localize the pain). Pt fevers/chills, cough, rashes, or changes in urination. Pt was seen by cardiology. His EKG revealed ischemic changes. ASA was continued and he was started on heparin.    Patient had increasing troponins throughout the day and worsening respiratory status with rising lactate. Cardiology consult saw the patient earlier in the day and in consultation with multiple interventional cardiologists recommended medical management for NSTEMI. The sustained cardiac arrest and ACLS was initiated. CICU was and recommended a code ECMO. The ECMO team cannulated the patient and transferred him to the CTICU.    Pt was seen in follow up by the GI team and the pt was deemed too unstable for ERCP. No intrahepatic ductal dilation was present for a PTC. Medical management of gallstone pancreatitis was recommended.     A CT head was performed and revealed a tentorial subdural hemorrhage. No mass effect or midline shift was present. A 4 hour repeat CTH was grossly stable.    A/p  S/p cardiac arrest with global hypokinesis  Gallstone pancreatitis (TB and Lipase downtrending)  ARDS  ERIC  Suspected demand ischemia   Continue ECMO per CTS  GI Follow up appreciated  Too unstable for ERCP or MRCP   Continue NPO/IVF/IV abx .  Case D/w Dr. Zelaya The patient is a 62 year old male with a medical history significant for CAD (s/p CABG ~3yrs ago), HTN and DM who presents in transfer from [place] for r/o STEMI. Labs were remarkable for a WBC of 20, troponin of 86, a TB of 3.4 and a lipase >3000. US revealed a thickened gallbladder and small stones. Pt was felt to have gallstone pancreatitis with demand ischemia.    At [facility], he received 325 aspirin, 600 plavix, and no heparin drip started. Additionally, the patient had multiple episodes of non-bloody diarrhea and emesis with associated abdominal/chest pain (he was unable to localize the pain). Pt fevers/chills, cough, rashes, or changes in urination. Pt was seen by cardiology. His EKG revealed ischemic changes. ASA was continued and he was started on heparin.    Patient had increasing troponins throughout the day and worsening respiratory status with rising lactate. Cardiology consult saw the patient earlier in the day and in consultation with multiple interventional cardiologists recommended medical management for NSTEMI. The sustained cardiac arrest and ACLS was initiated. CICU was and recommended a code ECMO. The ECMO team cannulated the patient and transferred him to the CTICU.    Pt was seen in follow up by the GI team and the pt was deemed too unstable for ERCP. No intrahepatic ductal dilation was present for a PTC. Medical management of gallstone pancreatitis was recommended.     A CT head was performed and revealed a tentorial subdural hemorrhage. No mass effect or midline shift was present. A 4 hour repeat CTH was grossly stable.    A/p  S/p cardiac arrest with global hypokinesis  Gallstone pancreatitis (TB and Lipase downtrending)  ARDS  ERIC  Suspected demand ischemia   Continue ECMO per CTS  GI Follow up appreciated  Too unstable for ERCP or MRCP   Continue NPO/IVF/IV abx .  Case D/w Dr. Zelaya

## 2022-11-26 NOTE — PROGRESS NOTE ADULT - SUBJECTIVE AND OBJECTIVE BOX
TRAUMA SURGERY PROGRESS NOTE    Patient: EDUARDO REAL , 62y ([birth year])Male   MRN: [id number]  Location: [place] [id number]  Visit: 22 Inpatient  Date: 22 @ 13:27      Procedure/Dx/Injuries:    Events of past 24 hours:  Respiratory failure resulting in intubation, cardiac arrest sp ECMO cannulation in CCU     PAST MEDICAL & SURGICAL HISTORY:      PHYSICAL EXAM:  GEN: resting in bed comfortably in NAD  RESP: no increased WOB  ABD: soft, non-distended, non-tender without rebound tenderness or guarding  EXTR: warm, well-perfused without gross deformities; spontaneous movement in b/l U/L extrem  NEURO: AAOx4     MEDICATIONS  (STANDING):  acetaZOLAMIDE Injectable 500 milliGRAM(s) IV Push every 6 hours  chlorhexidine 0.12% Liquid 15 milliLiter(s) Oral Mucosa every 12 hours  chlorhexidine 2% Cloths 1 Application(s) Topical <User Schedule>  chlorhexidine 4% Liquid 1 Application(s) Topical <User Schedule>  dexMEDEtomidine Infusion 1.5 MICROgram(s)/kG/Hr (34.8 mL/Hr) IV Continuous <Continuous>  dextrose 50% Injectable 50 milliLiter(s) IV Push every 15 minutes  EPINEPHrine    Infusion 0.07 MICROgram(s)/kG/Min (24.4 mL/Hr) IV Continuous <Continuous>  furosemide Infusion 2.5 mG/Hr (1.25 mL/Hr) IV Continuous <Continuous>  heparin  Infusion 500 Unit(s)/Hr (6 mL/Hr) IV Continuous <Continuous>  hydrocortisone sodium succinate Injectable 100 milliGRAM(s) IV Push every 8 hours  insulin lispro (ADMELOG) corrective regimen sliding scale   SubCutaneous every 6 hours  insulin regular Infusion 6 Unit(s)/Hr (6 mL/Hr) IV Continuous <Continuous>  meropenem  IVPB 1000 milliGRAM(s) IV Intermittent every 12 hours  norepinephrine Infusion 0.05 MICROgram(s)/kG/Min (8.71 mL/Hr) IV Continuous <Continuous>  pantoprazole  Injectable 40 milliGRAM(s) IV Push two times a day  propofol Infusion 10 MICROgram(s)/kG/Min (5.57 mL/Hr) IV Continuous <Continuous>  sodium chloride 0.9%. 1000 milliLiter(s) (10 mL/Hr) IV Continuous <Continuous>  vasopressin Infusion 0.1 Unit(s)/Min (15 mL/Hr) IV Continuous <Continuous>    MEDICATIONS  (PRN):  sodium chloride 0.9% lock flush 10 milliLiter(s) IV Push every 1 hour PRN Pre/post blood products, medications, blood draw, and to maintain line patency      DVT PROPHYLAXIS: SCDs, heparin  Infusion 500 Unit(s)/Hr IV Continuous <Continuous>    GI PROPHYLAXIS: pantoprazole  Injectable 40 milliGRAM(s) IV Push two times a day    ANTICOAGULATION:   ANTIBIOTICS: meropenem  IVPB 1000 milliGRAM(s)            LAB/STUDIES:  Labs:  CAPILLARY BLOOD GLUCOSE  95 (2022 13:00)  110 (2022 12:00)  123 (2022 11:00)  136 (2022 10:00)  152 (2022 09:00)  168 (2022 08:00)  229 (2022 05:00)  242 (2022 04:00)  241 (2022 03:00)  264 (2022 02:00)  275 (2022 23:00)      POCT Blood Glucose.: 95 mg/dL (2022 13:00)  POCT Blood Glucose.: 110 mg/dL (2022 12:22)  POCT Blood Glucose.: 123 mg/dL (2022 10:48)  POCT Blood Glucose.: 136 mg/dL (2022 09:51)  POCT Blood Glucose.: 152 mg/dL (2022 08:51)  POCT Blood Glucose.: 168 mg/dL (2022 07:54)  POCT Blood Glucose.: 202 mg/dL (2022 06:54)  POCT Blood Glucose.: 209 mg/dL (2022 06:08)  POCT Blood Glucose.: 242 mg/dL (2022 04:00)  POCT Blood Glucose.: 241 mg/dL (2022 02:52)  POCT Blood Glucose.: 264 mg/dL (2022 02:02)  POCT Blood Glucose.: 275 mg/dL (2022 23:03)                          9.8    18.25 )-----------( 107      ( 2022 01:53 )             28.8       Auto Neutrophil %: 88.0 % (22 @ 20:04)  Band Neutrophils %: 3.0 % (22 @ 20:04)        151<H>  |  101  |  39<H>  ----------------------------<  271<H>  3.1<L>   |  25  |  2.83<H>      Calcium, Total Serum: 9.8 mg/dL (22 @ 01:53)      LFTs:             x    | x    | x        ------------------[x       ( 2022 05:25 )  x    | x    | x           Lipase:672    Amylase:447       Blood Gas Arterial, Lactate: 3.7 mmol/L (22 @ 12:22)  Blood Gas Arterial, Lactate: 5.3 mmol/L (22 @ 08:53)  Blood Gas Arterial, Lactate: 6.5 mmol/L (22 @ 05:15)  Blood Gas Arterial, Lactate: 9.1 mmol/L (22 @ 04:14)  Blood Gas Arterial, Lactate: 10.5 mmol/L (22 @ 02:40)  Blood Gas Venous - Lactate: 9.6 mmol/L (22 @ 02:40)  Blood Gas Arterial, Lactate: 13.3 mmol/L (22 @ 00:35)  Blood Gas Arterial, Lactate: 15.0 mmol/L (22 @ 21:44)  Blood Gas Arterial, Lactate: 15.4 mmol/L (22 @ 20:30)  Blood Gas Arterial, Lactate: 15.5 mmol/L (22 @ 20:07)  Blood Gas Arterial, Lactate: >16.0 mmol/L (22 @ 19:15)  Blood Gas Arterial, Lactate: 15.0 mmol/L (22 @ 18:57)  Blood Gas Arterial, Lactate: >16.0 mmol/L (22 @ 18:30)  Blood Gas Arterial, Lactate: 15.8 mmol/L (22 @ 18:18)  Blood Gas Arterial, Lactate: >16.0 mmol/L (22 @ 17:32)  Blood Gas Arterial, Lactate: 15.1 mmol/L (22 @ 17:00)  Blood Gas Arterial, Lactate: >16.0 mmol/L (22 @ 16:47)  Blood Gas Arterial, Lactate: 5.5 mmol/L (22 @ 16:01)  Blood Gas Arterial, Lactate: 1.4 mmol/L (22 @ 15:00)  Blood Gas Arterial, Lactate: 1.4 mmol/L (22 @ 11:49)  Blood Gas Arterial, Lactate: 1.2 mmol/L (22 @ 09:40)  Blood Gas Arterial, Lactate: 1.2 mmol/L (22 @ 08:35)  Blood Gas Venous - Lactate: 2.7 mmol/L (22 @ 07:55)    ABG - ( 2022 12:22 )  pH: 7.55  /  pCO2: 41    /  pO2: 303   / HCO3: 36    / Base Excess: 12.4  /  SaO2: 96.9            ABG - ( 2022 08:53 )  pH: 7.54  /  pCO2: 40    /  pO2: 134   / HCO3: 34    / Base Excess: 10.7  /  SaO2: 96.4            ABG - ( 2022 05:15 )  pH: 7.52  /  pCO2: 40    /  pO2: 417   / HCO3: 33    / Base Excess: 9.1   /  SaO2: 96.8              Coags:     17.2   ----< 1.48    ( 2022 01:45 )     37.2        CARDIAC MARKERS ( 2022 01:53 )  x     / x     / 2856 U/L / x     / 247.6 ng/mL  CARDIAC MARKERS ( 2022 20:05 )  x     / x     / 2029 U/L / x     / 197.9 ng/mL  CARDIAC MARKERS ( 2022 11:53 )  x     / x     / 1598 U/L / x     / 113.4 ng/mL      Serum Pro-Brain Natriuretic Peptide: 330 pg/mL (22 @ 07:57)      Urinalysis Basic - ( 2022 10:37 )    Color: Yellow / Appearance: Slightly Turbid / S.027 / pH: x  Gluc: x / Ketone: Trace  / Bili: Negative / Urobili: Negative   Blood: x / Protein: 30 mg/dL / Nitrite: Negative   Leuk Esterase: Negative / RBC: 7 /hpf / WBC 7 /HPF   Sq Epi: x / Non Sq Epi: 2 /hpf / Bacteria: Negative                IMAGING:     TRAUMA SURGERY PROGRESS NOTE    Patient: EDUARDO REAL , 62y ([birth year])Male   MRN: [id number]  Location: [place] [id number]  Visit: 22 Inpatient  Date: 22 @ 13:27      Procedure/Dx/Injuries:    Events of past 24 hours:  Respiratory failure resulting in intubation, cardiac arrest sp ECMO cannulation in CCU     PAST MEDICAL & SURGICAL HISTORY:      PHYSICAL EXAM:  GEN: resting in bed comfortably in NAD  RESP: no increased WOB  ABD: soft, non-distended, non-tender without rebound tenderness or guarding  EXTR: warm, well-perfused without gross deformities; spontaneous movement in b/l U/L extrem  NEURO: AAOx4     MEDICATIONS  (STANDING):  acetaZOLAMIDE Injectable 500 milliGRAM(s) IV Push every 6 hours  chlorhexidine 0.12% Liquid 15 milliLiter(s) Oral Mucosa every 12 hours  chlorhexidine 2% Cloths 1 Application(s) Topical <User Schedule>  chlorhexidine 4% Liquid 1 Application(s) Topical <User Schedule>  dexMEDEtomidine Infusion 1.5 MICROgram(s)/kG/Hr (34.8 mL/Hr) IV Continuous <Continuous>  dextrose 50% Injectable 50 milliLiter(s) IV Push every 15 minutes  EPINEPHrine    Infusion 0.07 MICROgram(s)/kG/Min (24.4 mL/Hr) IV Continuous <Continuous>  furosemide Infusion 2.5 mG/Hr (1.25 mL/Hr) IV Continuous <Continuous>  heparin  Infusion 500 Unit(s)/Hr (6 mL/Hr) IV Continuous <Continuous>  hydrocortisone sodium succinate Injectable 100 milliGRAM(s) IV Push every 8 hours  insulin lispro (ADMELOG) corrective regimen sliding scale   SubCutaneous every 6 hours  insulin regular Infusion 6 Unit(s)/Hr (6 mL/Hr) IV Continuous <Continuous>  meropenem  IVPB 1000 milliGRAM(s) IV Intermittent every 12 hours  norepinephrine Infusion 0.05 MICROgram(s)/kG/Min (8.71 mL/Hr) IV Continuous <Continuous>  pantoprazole  Injectable 40 milliGRAM(s) IV Push two times a day  propofol Infusion 10 MICROgram(s)/kG/Min (5.57 mL/Hr) IV Continuous <Continuous>  sodium chloride 0.9%. 1000 milliLiter(s) (10 mL/Hr) IV Continuous <Continuous>  vasopressin Infusion 0.1 Unit(s)/Min (15 mL/Hr) IV Continuous <Continuous>    MEDICATIONS  (PRN):  sodium chloride 0.9% lock flush 10 milliLiter(s) IV Push every 1 hour PRN Pre/post blood products, medications, blood draw, and to maintain line patency      DVT PROPHYLAXIS: SCDs, heparin  Infusion 500 Unit(s)/Hr IV Continuous <Continuous>    GI PROPHYLAXIS: pantoprazole  Injectable 40 milliGRAM(s) IV Push two times a day    ANTICOAGULATION:   ANTIBIOTICS: meropenem  IVPB 1000 milliGRAM(s)            LAB/STUDIES:  Labs:  CAPILLARY BLOOD GLUCOSE  95 (2022 13:00)  110 (2022 12:00)  123 (2022 11:00)  136 (2022 10:00)  152 (2022 09:00)  168 (2022 08:00)  229 (2022 05:00)  242 (2022 04:00)  241 (2022 03:00)  264 (2022 02:00)  275 (2022 23:00)      POCT Blood Glucose.: 95 mg/dL (2022 13:00)  POCT Blood Glucose.: 110 mg/dL (2022 12:22)  POCT Blood Glucose.: 123 mg/dL (2022 10:48)  POCT Blood Glucose.: 136 mg/dL (2022 09:51)  POCT Blood Glucose.: 152 mg/dL (2022 08:51)  POCT Blood Glucose.: 168 mg/dL (2022 07:54)  POCT Blood Glucose.: 202 mg/dL (2022 06:54)  POCT Blood Glucose.: 209 mg/dL (2022 06:08)  POCT Blood Glucose.: 242 mg/dL (2022 04:00)  POCT Blood Glucose.: 241 mg/dL (2022 02:52)  POCT Blood Glucose.: 264 mg/dL (2022 02:02)  POCT Blood Glucose.: 275 mg/dL (2022 23:03)                          9.8    18.25 )-----------( 107      ( 2022 01:53 )             28.8       Auto Neutrophil %: 88.0 % (22 @ 20:04)  Band Neutrophils %: 3.0 % (22 @ 20:04)        151<H>  |  101  |  39<H>  ----------------------------<  271<H>  3.1<L>   |  25  |  2.83<H>      Calcium, Total Serum: 9.8 mg/dL (22 @ 01:53)      LFTs:             x    | x    | x        ------------------[x       ( 2022 05:25 )  x    | x    | x           Lipase:672    Amylase:447       Blood Gas Arterial, Lactate: 3.7 mmol/L (22 @ 12:22)  Blood Gas Arterial, Lactate: 5.3 mmol/L (22 @ 08:53)  Blood Gas Arterial, Lactate: 6.5 mmol/L (22 @ 05:15)  Blood Gas Arterial, Lactate: 9.1 mmol/L (22 @ 04:14)  Blood Gas Arterial, Lactate: 10.5 mmol/L (22 @ 02:40)  Blood Gas Venous - Lactate: 9.6 mmol/L (22 @ 02:40)  Blood Gas Arterial, Lactate: 13.3 mmol/L (22 @ 00:35)  Blood Gas Arterial, Lactate: 15.0 mmol/L (22 @ 21:44)  Blood Gas Arterial, Lactate: 15.4 mmol/L (22 @ 20:30)  Blood Gas Arterial, Lactate: 15.5 mmol/L (22 @ 20:07)  Blood Gas Arterial, Lactate: >16.0 mmol/L (22 @ 19:15)  Blood Gas Arterial, Lactate: 15.0 mmol/L (22 @ 18:57)  Blood Gas Arterial, Lactate: >16.0 mmol/L (22 @ 18:30)  Blood Gas Arterial, Lactate: 15.8 mmol/L (22 @ 18:18)  Blood Gas Arterial, Lactate: >16.0 mmol/L (22 @ 17:32)  Blood Gas Arterial, Lactate: 15.1 mmol/L (22 @ 17:00)  Blood Gas Arterial, Lactate: >16.0 mmol/L (22 @ 16:47)  Blood Gas Arterial, Lactate: 5.5 mmol/L (22 @ 16:01)  Blood Gas Arterial, Lactate: 1.4 mmol/L (22 @ 15:00)  Blood Gas Arterial, Lactate: 1.4 mmol/L (22 @ 11:49)  Blood Gas Arterial, Lactate: 1.2 mmol/L (22 @ 09:40)  Blood Gas Arterial, Lactate: 1.2 mmol/L (22 @ 08:35)  Blood Gas Venous - Lactate: 2.7 mmol/L (22 @ 07:55)    ABG - ( 2022 12:22 )  pH: 7.55  /  pCO2: 41    /  pO2: 303   / HCO3: 36    / Base Excess: 12.4  /  SaO2: 96.9            ABG - ( 2022 08:53 )  pH: 7.54  /  pCO2: 40    /  pO2: 134   / HCO3: 34    / Base Excess: 10.7  /  SaO2: 96.4            ABG - ( 2022 05:15 )  pH: 7.52  /  pCO2: 40    /  pO2: 417   / HCO3: 33    / Base Excess: 9.1   /  SaO2: 96.8              Coags:     17.2   ----< 1.48    ( 2022 01:45 )     37.2        CARDIAC MARKERS ( 2022 01:53 )  x     / x     / 2856 U/L / x     / 247.6 ng/mL  CARDIAC MARKERS ( 2022 20:05 )  x     / x     / 2029 U/L / x     / 197.9 ng/mL  CARDIAC MARKERS ( 2022 11:53 )  x     / x     / 1598 U/L / x     / 113.4 ng/mL      Serum Pro-Brain Natriuretic Peptide: 330 pg/mL (22 @ 07:57)      Urinalysis Basic - ( 2022 10:37 )    Color: Yellow / Appearance: Slightly Turbid / S.027 / pH: x  Gluc: x / Ketone: Trace  / Bili: Negative / Urobili: Negative   Blood: x / Protein: 30 mg/dL / Nitrite: Negative   Leuk Esterase: Negative / RBC: 7 /hpf / WBC 7 /HPF   Sq Epi: x / Non Sq Epi: 2 /hpf / Bacteria: Negative                IMAGING:     TRAUMA SURGERY PROGRESS NOTE    Patient: EDUARDO REAL , 62y ([birth year])Male   MRN: [id number]  Location: [place] [id number]  Visit: 22 Inpatient  Date: 22 @ 13:27      Procedure/Dx/Injuries:    Events of past 24 hours:  Respiratory failure resulting in intubation, cardiac arrest sp ECMO cannulation in CCU     PAST MEDICAL & SURGICAL HISTORY:      PHYSICAL EXAM:  GEN: resting in bed comfortably in NAD  RESP: no increased WOB  ABD: soft, non-distended, non-tender without rebound tenderness or guarding  EXTR: warm, well-perfused without gross deformities; spontaneous movement in b/l U/L extrem  NEURO: AAOx4     MEDICATIONS  (STANDING):  acetaZOLAMIDE Injectable 500 milliGRAM(s) IV Push every 6 hours  chlorhexidine 0.12% Liquid 15 milliLiter(s) Oral Mucosa every 12 hours  chlorhexidine 2% Cloths 1 Application(s) Topical <User Schedule>  chlorhexidine 4% Liquid 1 Application(s) Topical <User Schedule>  dexMEDEtomidine Infusion 1.5 MICROgram(s)/kG/Hr (34.8 mL/Hr) IV Continuous <Continuous>  dextrose 50% Injectable 50 milliLiter(s) IV Push every 15 minutes  EPINEPHrine    Infusion 0.07 MICROgram(s)/kG/Min (24.4 mL/Hr) IV Continuous <Continuous>  furosemide Infusion 2.5 mG/Hr (1.25 mL/Hr) IV Continuous <Continuous>  heparin  Infusion 500 Unit(s)/Hr (6 mL/Hr) IV Continuous <Continuous>  hydrocortisone sodium succinate Injectable 100 milliGRAM(s) IV Push every 8 hours  insulin lispro (ADMELOG) corrective regimen sliding scale   SubCutaneous every 6 hours  insulin regular Infusion 6 Unit(s)/Hr (6 mL/Hr) IV Continuous <Continuous>  meropenem  IVPB 1000 milliGRAM(s) IV Intermittent every 12 hours  norepinephrine Infusion 0.05 MICROgram(s)/kG/Min (8.71 mL/Hr) IV Continuous <Continuous>  pantoprazole  Injectable 40 milliGRAM(s) IV Push two times a day  propofol Infusion 10 MICROgram(s)/kG/Min (5.57 mL/Hr) IV Continuous <Continuous>  sodium chloride 0.9%. 1000 milliLiter(s) (10 mL/Hr) IV Continuous <Continuous>  vasopressin Infusion 0.1 Unit(s)/Min (15 mL/Hr) IV Continuous <Continuous>    MEDICATIONS  (PRN):  sodium chloride 0.9% lock flush 10 milliLiter(s) IV Push every 1 hour PRN Pre/post blood products, medications, blood draw, and to maintain line patency      DVT PROPHYLAXIS: SCDs, heparin  Infusion 500 Unit(s)/Hr IV Continuous <Continuous>    GI PROPHYLAXIS: pantoprazole  Injectable 40 milliGRAM(s) IV Push two times a day    ANTICOAGULATION:   ANTIBIOTICS: meropenem  IVPB 1000 milliGRAM(s)            LAB/STUDIES:  Labs:  CAPILLARY BLOOD GLUCOSE  95 (2022 13:00)  110 (2022 12:00)  123 (2022 11:00)  136 (2022 10:00)  152 (2022 09:00)  168 (2022 08:00)  229 (2022 05:00)  242 (2022 04:00)  241 (2022 03:00)  264 (2022 02:00)  275 (2022 23:00)      POCT Blood Glucose.: 95 mg/dL (2022 13:00)  POCT Blood Glucose.: 110 mg/dL (2022 12:22)  POCT Blood Glucose.: 123 mg/dL (2022 10:48)  POCT Blood Glucose.: 136 mg/dL (2022 09:51)  POCT Blood Glucose.: 152 mg/dL (2022 08:51)  POCT Blood Glucose.: 168 mg/dL (2022 07:54)  POCT Blood Glucose.: 202 mg/dL (2022 06:54)  POCT Blood Glucose.: 209 mg/dL (2022 06:08)  POCT Blood Glucose.: 242 mg/dL (2022 04:00)  POCT Blood Glucose.: 241 mg/dL (2022 02:52)  POCT Blood Glucose.: 264 mg/dL (2022 02:02)  POCT Blood Glucose.: 275 mg/dL (2022 23:03)                          9.8    18.25 )-----------( 107      ( 2022 01:53 )             28.8       Auto Neutrophil %: 88.0 % (22 @ 20:04)  Band Neutrophils %: 3.0 % (22 @ 20:04)        151<H>  |  101  |  39<H>  ----------------------------<  271<H>  3.1<L>   |  25  |  2.83<H>      Calcium, Total Serum: 9.8 mg/dL (22 @ 01:53)      LFTs:             x    | x    | x        ------------------[x       ( 2022 05:25 )  x    | x    | x           Lipase:672    Amylase:447       Blood Gas Arterial, Lactate: 3.7 mmol/L (22 @ 12:22)  Blood Gas Arterial, Lactate: 5.3 mmol/L (22 @ 08:53)  Blood Gas Arterial, Lactate: 6.5 mmol/L (22 @ 05:15)  Blood Gas Arterial, Lactate: 9.1 mmol/L (22 @ 04:14)  Blood Gas Arterial, Lactate: 10.5 mmol/L (22 @ 02:40)  Blood Gas Venous - Lactate: 9.6 mmol/L (22 @ 02:40)  Blood Gas Arterial, Lactate: 13.3 mmol/L (22 @ 00:35)  Blood Gas Arterial, Lactate: 15.0 mmol/L (22 @ 21:44)  Blood Gas Arterial, Lactate: 15.4 mmol/L (22 @ 20:30)  Blood Gas Arterial, Lactate: 15.5 mmol/L (22 @ 20:07)  Blood Gas Arterial, Lactate: >16.0 mmol/L (22 @ 19:15)  Blood Gas Arterial, Lactate: 15.0 mmol/L (22 @ 18:57)  Blood Gas Arterial, Lactate: >16.0 mmol/L (22 @ 18:30)  Blood Gas Arterial, Lactate: 15.8 mmol/L (22 @ 18:18)  Blood Gas Arterial, Lactate: >16.0 mmol/L (22 @ 17:32)  Blood Gas Arterial, Lactate: 15.1 mmol/L (22 @ 17:00)  Blood Gas Arterial, Lactate: >16.0 mmol/L (22 @ 16:47)  Blood Gas Arterial, Lactate: 5.5 mmol/L (22 @ 16:01)  Blood Gas Arterial, Lactate: 1.4 mmol/L (22 @ 15:00)  Blood Gas Arterial, Lactate: 1.4 mmol/L (22 @ 11:49)  Blood Gas Arterial, Lactate: 1.2 mmol/L (22 @ 09:40)  Blood Gas Arterial, Lactate: 1.2 mmol/L (22 @ 08:35)  Blood Gas Venous - Lactate: 2.7 mmol/L (22 @ 07:55)    ABG - ( 2022 12:22 )  pH: 7.55  /  pCO2: 41    /  pO2: 303   / HCO3: 36    / Base Excess: 12.4  /  SaO2: 96.9            ABG - ( 2022 08:53 )  pH: 7.54  /  pCO2: 40    /  pO2: 134   / HCO3: 34    / Base Excess: 10.7  /  SaO2: 96.4            ABG - ( 2022 05:15 )  pH: 7.52  /  pCO2: 40    /  pO2: 417   / HCO3: 33    / Base Excess: 9.1   /  SaO2: 96.8              Coags:     17.2   ----< 1.48    ( 2022 01:45 )     37.2        CARDIAC MARKERS ( 2022 01:53 )  x     / x     / 2856 U/L / x     / 247.6 ng/mL  CARDIAC MARKERS ( 2022 20:05 )  x     / x     / 2029 U/L / x     / 197.9 ng/mL  CARDIAC MARKERS ( 2022 11:53 )  x     / x     / 1598 U/L / x     / 113.4 ng/mL      Serum Pro-Brain Natriuretic Peptide: 330 pg/mL (22 @ 07:57)      Urinalysis Basic - ( 2022 10:37 )    Color: Yellow / Appearance: Slightly Turbid / S.027 / pH: x  Gluc: x / Ketone: Trace  / Bili: Negative / Urobili: Negative   Blood: x / Protein: 30 mg/dL / Nitrite: Negative   Leuk Esterase: Negative / RBC: 7 /hpf / WBC 7 /HPF   Sq Epi: x / Non Sq Epi: 2 /hpf / Bacteria: Negative                IMAGING:

## 2022-11-26 NOTE — PROGRESS NOTE ADULT - ASSESSMENT
Now transferred to the CTU in shock and ARDS on [facility] ECMO s/p cardiac arrest  This is likely due to gallstone pancreatitis leading to ARDS and shock -> cardiac arrest  However he also has a significant troponin elevation and his LVEF has decreased significantly  His LV is globally down  This could be due to balanced ischemia vs. stunning in the setting of sepsis/acidosis/arrest  His CVP is low and he has no RWMA so I would favor a non-coronary pathology driving his critical illness  Regardless, he is too critically ill and unstable to have an angiogram and potential PCI now  He also has an ERIC and a new subdural bleed, both of which could worsen significantly with contrast and DAPT, respectively  If he survives and stabilizes he should get a coronary angiogram  However, mortality from ARDS-associated pancreatitis is close to 50% per some studies I found  I fear that his prognosis is overall poor regardless of the etiology of his illness and regardless of what further interventions are done  CICU will sign off  I have spoken to the HF service and they will continue to follow the patient on behalf of Cardiology

## 2022-11-26 NOTE — CONSULT NOTE ADULT - ASSESSMENT
Patient is a 63 y/o male with PMH of CAD s/p CABG, DM, HTN, HLD presenting as transfer from [place] for c/f STEMI. Pt initially presented to [facility] w/ epigastric pain, also found to have lipase 30K, elevated WBC. Since transfer, course c/b cardiac arrest s/p ROSC, now on ECMO and SDH. Pt intubated/sedated on pressors in CTICU. GI consulted due to concern for gallstone pancreatitis.    #STEMI  #Cardiac arrest s/p ROSC, on ECMO. HST>10K  #SDH  #Pancreatitis: lipase elevated on presentation, mild pancreatitis on CT A/P. C/f gallstone pancreatitis given cholelithiasis on imaging. Tbili fluctuating, but no CBD dilation on CT or US. S/p IVF.   #ARDS, possibly 2/2 pancreatitis  #Elevated transaminases: likely 2/2 ischemia   #Pericholecystic fluid, cholelithiasis    Recommendations  - patient critically ill at this time with poor overall prognosis. His Tbili is fluctuating, but he has no evidence of biliary ductal dilation on US or CT. Unable to undergo MRCP while on ECMO. At this time, no indication for ERCP     All recommendations are tentative until note is attested by attending.     Leah Mehta, PGY5  Gastroenterology/Hepatology Fellow  Available on [employer] Teams  [phone] ([employer] Short Range Pager)  [phone] (Long Range Pager)    After 5pm, please contact the on-call GI fellow. [phone]    Patient is a 61 y/o male with PMH of CAD s/p CABG, DM, HTN, HLD presenting as transfer from [place] for c/f STEMI. Pt initially presented to [facility] w/ epigastric pain, also found to have lipase 30K, elevated WBC. Since transfer, course c/b cardiac arrest s/p ROSC, now on ECMO and SDH. Pt intubated/sedated on pressors in CTICU. GI consulted due to concern for gallstone pancreatitis.    #STEMI  #Cardiac arrest s/p ROSC, on ECMO. HST>10K  #SDH  #Pancreatitis: lipase elevated on presentation, mild pancreatitis on CT A/P. C/f gallstone pancreatitis given cholelithiasis on imaging. Tbili fluctuating, but no CBD dilation on CT or US. S/p IVF.   #ARDS, possibly 2/2 pancreatitis  #Elevated transaminases: likely 2/2 ischemia   #Pericholecystic fluid, cholelithiasis    Recommendations  - patient critically ill at this time with poor overall prognosis. His Tbili is fluctuating, but he has no evidence of biliary ductal dilation on US or CT. Unable to undergo MRCP while on ECMO. At this time, no indication for ERCP     All recommendations are tentative until note is attested by attending.     Leah Mehta, PGY5  Gastroenterology/Hepatology Fellow  Available on [employer] Teams  [phone] ([employer] Short Range Pager)  [phone] (Long Range Pager)    After 5pm, please contact the on-call GI fellow. [phone]    Patient is a 63 y/o male with PMH of CAD s/p CABG, DM, HTN, HLD presenting as transfer from [place] for c/f STEMI. Pt initially presented to [facility] w/ epigastric pain, also found to have lipase 30K, elevated WBC. Since transfer, course c/b cardiac arrest s/p ROSC, now on ECMO and SDH. Pt intubated/sedated on pressors in CTICU. GI consulted due to concern for gallstone pancreatitis.    #STEMI  #Cardiac arrest s/p ROSC, on ECMO. HST>10K  #SDH  #Pancreatitis: lipase elevated on presentation, mild pancreatitis on CT A/P. C/f gallstone pancreatitis given cholelithiasis on imaging. Tbili fluctuating, but no CBD dilation on CT or US. S/p IVF.   #ARDS, possibly 2/2 pancreatitis  #Elevated transaminases: likely 2/2 ischemia   #Pericholecystic fluid, cholelithiasis    Recommendations  - patient critically ill at this time with poor overall prognosis. His Tbili is fluctuating, but he has no evidence of biliary ductal dilation on US or CT. Unable to undergo MRCP while on ECMO. At this time, no indication for ERCP     All recommendations are tentative until note is attested by attending.     Leah Mehta, PGY5  Gastroenterology/Hepatology Fellow  Available on [employer] Teams  [phone] ([employer] Short Range Pager)  [phone] (Long Range Pager)    After 5pm, please contact the on-call GI fellow. [phone]    Patient is a 61 y/o male with PMH of CAD s/p CABG, DM, HTN, HLD presenting as transfer from [place] for c/f STEMI. Pt initially presented to [facility] w/ epigastric pain, also found to have lipase 30K, elevated WBC. Since transfer, course c/b cardiac arrest s/p ROSC, now on ECMO and SDH. Pt intubated/sedated on pressors in CTICU. GI consulted due to concern for gallstone pancreatitis.    #STEMI  #Cardiac arrest s/p ROSC, on ECMO. HST>10K  #SDH  #Pancreatitis: lipase elevated on presentation, mild pancreatitis on CT A/P. C/f gallstone pancreatitis given cholelithiasis on imaging. Tbili fluctuating, but no CBD dilation on CT or US. S/p IVF.   #ARDS, possibly 2/2 pancreatitis  #Elevated transaminases: likely 2/2 ischemia   #Pericholecystic fluid, cholelithiasis    Recommendations  - patient critically ill at this time with poor overall prognosis. His Tbili is fluctuating, but he has no evidence of biliary ductal dilation on US or CT. Unable to undergo MRCP while on ECMO. At this time, no indication for ERCP  - continue to trend CMP     All recommendations are tentative until note is attested by attending.     Leah Mehta, PGY5  Gastroenterology/Hepatology Fellow  Available on [employer] Teams  [phone] ([employer] Short Range Pager)  [phone] (Long Range Pager)    After 5pm, please contact the on-call GI fellow. [phone]    Patient is a 61 y/o male with PMH of CAD s/p CABG, DM, HTN, HLD presenting as transfer from [place] for c/f STEMI. Pt initially presented to [facility] w/ epigastric pain, also found to have lipase 30K, elevated WBC. Since transfer, course c/b cardiac arrest s/p ROSC, now on ECMO and SDH. Pt intubated/sedated on pressors in CTICU. GI consulted due to concern for gallstone pancreatitis.    #STEMI  #Cardiac arrest s/p ROSC, on ECMO. HST>10K  #SDH  #Pancreatitis: lipase elevated on presentation, mild pancreatitis on CT A/P. C/f gallstone pancreatitis given cholelithiasis on imaging. Tbili fluctuating, but no CBD dilation on CT or US. S/p IVF.   #ARDS, possibly 2/2 pancreatitis  #Elevated transaminases: likely 2/2 ischemia   #Pericholecystic fluid, cholelithiasis    Recommendations  - patient critically ill at this time with poor overall prognosis. His Tbili is fluctuating, but he has no evidence of biliary ductal dilation on US or CT. Unable to undergo MRCP while on ECMO. At this time, no indication for ERCP  - continue to trend CMP     All recommendations are tentative until note is attested by attending.     Leah Mehta, PGY5  Gastroenterology/Hepatology Fellow  Available on [employer] Teams  [phone] ([employer] Short Range Pager)  [phone] (Long Range Pager)    After 5pm, please contact the on-call GI fellow. [phone]    Patient is a 63 y/o male with PMH of CAD s/p CABG, DM, HTN, HLD presenting as transfer from [place] for c/f STEMI. Pt initially presented to [facility] w/ epigastric pain, also found to have lipase 30K, elevated WBC. Since transfer, course c/b cardiac arrest s/p ROSC, now on ECMO and SDH. Pt intubated/sedated on pressors in CTICU. GI consulted due to concern for gallstone pancreatitis.    #STEMI  #Cardiac arrest s/p ROSC, on ECMO. HST>10K  #SDH  #Pancreatitis: lipase elevated on presentation, mild pancreatitis on CT A/P. C/f gallstone pancreatitis given cholelithiasis on imaging. Tbili fluctuating, but no CBD dilation on CT or US. S/p IVF.   #ARDS, possibly 2/2 pancreatitis  #Elevated transaminases: likely 2/2 ischemia   #Pericholecystic fluid, cholelithiasis    Recommendations  - patient critically ill at this time with poor overall prognosis. His Tbili is fluctuating, but he has no evidence of biliary ductal dilation on US or CT. Unable to undergo MRCP while on ECMO. At this time, no indication for ERCP  - continue to trend CMP     All recommendations are tentative until note is attested by attending.     Leah Mehta, PGY5  Gastroenterology/Hepatology Fellow  Available on [employer] Teams  [phone] ([employer] Short Range Pager)  [phone] (Long Range Pager)    After 5pm, please contact the on-call GI fellow. [phone]

## 2022-11-26 NOTE — PROGRESS NOTE ADULT - ASSESSMENT
This is a 61yo Male with PMHx of CABG x4 in [facility] ~3yrs ago, T2DM, HTN, HLD, who presented initially to [place] ED for one day of abdominal pain, nausea, vomiting. Found to have abnormal EKG and was transferred here for further evaluation. Labs at OSH notable for Lipase of >30,000, WBC 20, Cr 2.27, , , Troponin I 234, Covid-19 negative. Afebrile, HR 110s, BPs 110s/70s.      *** INCOMPLETE NOTE *** INCOMPLETE NOTE *** INCOMPLETE NOTE *** INCOMPLETE NOTE *** INCOMPLETE NOTE *** INCOMPLETE NOTE *** INCOMPLETE NOTE *** INCOMPLETE NOTE *** INCOMPLETE NOTE *** INCOMPLETE NOTE *** INCOMPLETE NOTE *** INCOMPLETE NOTE *** INCOMPLETE NOTE *** INCOMPLETE NOTE *** INCOMPLETE NOTE *** INCOMPLETE NOTE *** INCOMPLETE NOTE *** INCOMPLETE NOTE *** INCOMPLETE NOTE *** INCOMPLETE NOTE ***  RECS BELOW ARE NOT TO BE FOLLOWED UNTIL FINALIZED  epi gtt, furo gtt, hep gtt, hydrocort 100 IV q8h, merop, norepi gtt, prop gtt, vaso gtt    Recommendations  - EKG in ED shows sinus tach with LVH, STD I, II, V4-V6. Similar to EKG at [place]. No prior baseline for comparison.   - Troponin I 234 at OSH, Troponin  at [facility] 87, 207, 455, 1403, 2445, has not currently peaked, continue trending to peak  - TTE 11/25: EF 43%, Moderately dilated left atrium, Moderate segmental left ventricular systolic dysfunction. Akinesis of the  mid-basal anterolateral wall, inferior and inferolateral wall with all remaining walls hypokinetic, Moderate diastolic dysfunction (Stage II).  -  Continue Aspirin 81mg and Plavix, in addition to Heparin drip   - Monitor on Telemetry   - Lipase > 30,000 at OSH. CT Abd Acute interstitial edematous pancreatitis. Primary team concerned for development of ARDS at this time.   - Case discussed with interventional attending Dr. Montes; medical management advised currently. Pt denying chest pain, BP stable, has received recent contrast load during CT.   - Please notify cardiology if patient develops any chest pain, dynamic EKG changes     62 year old male with history of 4v CABG in [facility] approx 3 years ago (no stents), T2DM, HTN, HLD who presented to  ED with abd pain, N/V, found to have gallstone pancreatitis. He was transferred to [facility] for ischemic ECG changes.     hsTrop 207 to 455 to 1403 to 2445 to 3425 noted  TTE shows mild LV dysfunction (EF 43%), akinesis of mid-basal anterolateral wall, inferior and inferolateral walls with moderate diastolic dysfunction. There is normal RV size and function. Mild MR. Estimated RA pressure is 8.    -pt is currently chest pain free  -Case was discussed with interventionalist on call Dr. De La Rosa and day time interventionalist Dr. Montes, would defer LHC at this time and medically treat patient for NSTEMI at this time given active gallstone pancreatitis  -continue DAPT and heparin gtt  -continue to trend troponins till downtrend, serial ECGs  -start statin if able from LFT/surgical perspective  -appreciate SICU care .    eBtito Nguyen MD  Cardiology Fellow - PGY6    For all New Consults and Questions:  [email or web address]   Login: [email or web address]    *** Recommendations are preliminary until cosigned by the attending. This is a 63yo Male with PMHx of CABG x4 in [facility] ~3yrs ago, T2DM, HTN, HLD, who presented initially to [place] ED for one day of abdominal pain, nausea, vomiting. Found to have abnormal EKG and was transferred here for further evaluation. Labs at OSH notable for Lipase of >30,000, WBC 20, Cr 2.27, , , Troponin I 234, Covid-19 negative. Afebrile, HR 110s, BPs 110s/70s.      *** INCOMPLETE NOTE *** INCOMPLETE NOTE *** INCOMPLETE NOTE *** INCOMPLETE NOTE *** INCOMPLETE NOTE *** INCOMPLETE NOTE *** INCOMPLETE NOTE *** INCOMPLETE NOTE *** INCOMPLETE NOTE *** INCOMPLETE NOTE *** INCOMPLETE NOTE *** INCOMPLETE NOTE *** INCOMPLETE NOTE *** INCOMPLETE NOTE *** INCOMPLETE NOTE *** INCOMPLETE NOTE *** INCOMPLETE NOTE *** INCOMPLETE NOTE *** INCOMPLETE NOTE *** INCOMPLETE NOTE ***  RECS BELOW ARE NOT TO BE FOLLOWED UNTIL FINALIZED  epi gtt, furo gtt, hep gtt, hydrocort 100 IV q8h, merop, norepi gtt, prop gtt, vaso gtt    Recommendations  - EKG in ED shows sinus tach with LVH, STD I, II, V4-V6. Similar to EKG at [place]. No prior baseline for comparison.   - Troponin I 234 at OSH, Troponin  at [facility] 87, 207, 455, 1403, 2445, has not currently peaked, continue trending to peak  - TTE 11/25: EF 43%, Moderately dilated left atrium, Moderate segmental left ventricular systolic dysfunction. Akinesis of the  mid-basal anterolateral wall, inferior and inferolateral wall with all remaining walls hypokinetic, Moderate diastolic dysfunction (Stage II).  -  Continue Aspirin 81mg and Plavix, in addition to Heparin drip   - Monitor on Telemetry   - Lipase > 30,000 at OSH. CT Abd Acute interstitial edematous pancreatitis. Primary team concerned for development of ARDS at this time.   - Case discussed with interventional attending Dr. Montes; medical management advised currently. Pt denying chest pain, BP stable, has received recent contrast load during CT.   - Please notify cardiology if patient develops any chest pain, dynamic EKG changes     62 year old male with history of 4v CABG in [facility] approx 3 years ago (no stents), T2DM, HTN, HLD who presented to  ED with abd pain, N/V, found to have gallstone pancreatitis. He was transferred to [facility] for ischemic ECG changes.     hsTrop 207 to 455 to 1403 to 2445 to 3425 noted  TTE shows mild LV dysfunction (EF 43%), akinesis of mid-basal anterolateral wall, inferior and inferolateral walls with moderate diastolic dysfunction. There is normal RV size and function. Mild MR. Estimated RA pressure is 8.    -pt is currently chest pain free  -Case was discussed with interventionalist on call Dr. De La Rosa and day time interventionalist Dr. Montes, would defer LHC at this time and medically treat patient for NSTEMI at this time given active gallstone pancreatitis  -continue DAPT and heparin gtt  -continue to trend troponins till downtrend, serial ECGs  -start statin if able from LFT/surgical perspective  -appreciate SICU care .    Betito Nguyen MD  Cardiology Fellow - PGY6    For all New Consults and Questions:  [email or web address]   Login: cardfellows    *** Recommendations are preliminary until cosigned by the attending. This is a 61yo Male with PMHx of CABG x4 in [facility] ~3yrs ago, T2DM, HTN, HLD, who presented initially to [place] ED for one day of abdominal pain, nausea, vomiting. Found to have abnormal EKG and was transferred here for further evaluation. Labs at OSH notable for Lipase of >30,000, WBC 20, Cr 2.27, , , Troponin I 234, Covid-19 negative. Afebrile, HR 110s, BPs 110s/70s.      *** INCOMPLETE NOTE *** INCOMPLETE NOTE *** INCOMPLETE NOTE *** INCOMPLETE NOTE *** INCOMPLETE NOTE *** INCOMPLETE NOTE *** INCOMPLETE NOTE *** INCOMPLETE NOTE *** INCOMPLETE NOTE *** INCOMPLETE NOTE *** INCOMPLETE NOTE *** INCOMPLETE NOTE *** INCOMPLETE NOTE *** INCOMPLETE NOTE *** INCOMPLETE NOTE *** INCOMPLETE NOTE *** INCOMPLETE NOTE *** INCOMPLETE NOTE *** INCOMPLETE NOTE *** INCOMPLETE NOTE ***  RECS BELOW ARE NOT TO BE FOLLOWED UNTIL FINALIZED  epi gtt, furo gtt, hep gtt, hydrocort 100 IV q8h, merop, norepi gtt, prop gtt, vaso gtt    Recommendations  - EKG in ED shows sinus tach with LVH, STD I, II, V4-V6. Similar to EKG at [place]. No prior baseline for comparison.   - Troponin I 234 at OSH, Troponin  at [facility] 87, 207, 455, 1403, 2445, has not currently peaked, continue trending to peak  - TTE 11/25: EF 43%, Moderately dilated left atrium, Moderate segmental left ventricular systolic dysfunction. Akinesis of the  mid-basal anterolateral wall, inferior and inferolateral wall with all remaining walls hypokinetic, Moderate diastolic dysfunction (Stage II).  -  Continue Aspirin 81mg and Plavix, in addition to Heparin drip   - Monitor on Telemetry   - Lipase > 30,000 at OSH. CT Abd Acute interstitial edematous pancreatitis. Primary team concerned for development of ARDS at this time.   - Case discussed with interventional attending Dr. Montes; medical management advised currently. Pt denying chest pain, BP stable, has received recent contrast load during CT.   - Please notify cardiology if patient develops any chest pain, dynamic EKG changes     62 year old male with history of 4v CABG in [facility] approx 3 years ago (no stents), T2DM, HTN, HLD who presented to  ED with abd pain, N/V, found to have gallstone pancreatitis. He was transferred to [facility] for ischemic ECG changes.     hsTrop 207 to 455 to 1403 to 2445 to 3425 noted  TTE shows mild LV dysfunction (EF 43%), akinesis of mid-basal anterolateral wall, inferior and inferolateral walls with moderate diastolic dysfunction. There is normal RV size and function. Mild MR. Estimated RA pressure is 8.    -pt is currently chest pain free  -Case was discussed with interventionalist on call Dr. De La Rosa and day time interventionalist Dr. Montes, would defer LHC at this time and medically treat patient for NSTEMI at this time given active gallstone pancreatitis  -continue DAPT and heparin gtt  -continue to trend troponins till downtrend, serial ECGs  -start statin if able from LFT/surgical perspective  -appreciate SICU care .    Betito Nguyen MD  Cardiology Fellow - PGY6    For all New Consults and Questions:  [email or web address]   Login: cardfellows    *** Recommendations are preliminary until cosigned by the attending. This is a 61yo Male with PMHx of CABG x4 in [facility] ~3yrs ago, T2DM, HTN, HLD, who presented initially to [place] ED for one day of abdominal pain, nausea, vomiting. Found to have abnormal EKG and was transferred here for further evaluation. Labs at OSH notable for Lipase of >30,000, WBC 20, Cr 2.27, , , Troponin I 234, Covid-19 negative. Afebrile, HR 110s, BPs 110s/70s. Cannulated for ECMO 11/25 after cardiac arrest (ROSC after 30 min).    Recommendations:  - repeat EKG PRN for clinical changes  - trend trops to peak  - TTE 11/25: EF 43%, Moderately dilated left atrium, Moderate segmental left ventricular systolic dysfunction. Akinesis of the  mid-basal anterolateral wall, inferior and inferolateral wall with all remaining walls hypokinetic, Moderate diastolic dysfunction (Stage II).  - currently on ECMO, based on history of CAD pt would benefit from DAPT if able, defer risk/benefits/alternatives to primary team, c/t hep gtt  - keep on tele  - mgmt of pancreatitis per primary/gen surg  - general cardiology to sign off, f/u w/ advanced heart failure for further recommendations    Betito Nguyen MD  Cardiology Fellow - PGY6    For all New Consults and Questions:  [email or web address]   Login: cardfellows    *** Recommendations are preliminary until cosigned by the attending. This is a 63yo Male with PMHx of CABG x4 in [facility] ~3yrs ago, T2DM, HTN, HLD, who presented initially to [place] ED for one day of abdominal pain, nausea, vomiting. Found to have abnormal EKG and was transferred here for further evaluation. Labs at OSH notable for Lipase of >30,000, WBC 20, Cr 2.27, , , Troponin I 234, Covid-19 negative. Afebrile, HR 110s, BPs 110s/70s. Cannulated for ECMO 11/25 after cardiac arrest (ROSC after 30 min).    Recommendations:  - repeat EKG PRN for clinical changes  - trend trops to peak  - TTE 11/25: EF 43%, Moderately dilated left atrium, Moderate segmental left ventricular systolic dysfunction. Akinesis of the  mid-basal anterolateral wall, inferior and inferolateral wall with all remaining walls hypokinetic, Moderate diastolic dysfunction (Stage II).  - currently on ECMO, based on history of CAD pt would benefit from DAPT if able, defer risk/benefits/alternatives to primary team, c/t hep gtt  - keep on tele  - mgmt of pancreatitis per primary/gen surg  - general cardiology to sign off, f/u w/ advanced heart failure for further recommendations    Betito Nguyen MD  Cardiology Fellow - PGY6    For all New Consults and Questions:  [email or web address]   Login: cardfellows    *** Recommendations are preliminary until cosigned by the attending. This is a 63yo Male with PMHx of CABG x4 in [facility] ~3yrs ago, T2DM, HTN, HLD, who presented initially to [place] ED for one day of abdominal pain, nausea, vomiting. Found to have abnormal EKG and was transferred here for further evaluation. Labs at OSH notable for Lipase of >30,000, WBC 20, Cr 2.27, , , Troponin I 234, Covid-19 negative. Afebrile, HR 110s, BPs 110s/70s. Cannulated for ECMO 11/25 after cardiac arrest (ROSC after 30 min).    Recommendations:  - repeat EKG PRN for clinical changes  - trend trops to peak  - TTE 11/25: EF 43%, Moderately dilated left atrium, Moderate segmental left ventricular systolic dysfunction. Akinesis of the  mid-basal anterolateral wall, inferior and inferolateral wall with all remaining walls hypokinetic, Moderate diastolic dysfunction (Stage II).  - currently on ECMO, based on history of CAD pt would benefit from DAPT if able, defer risk/benefits/alternatives to primary team, c/t hep gtt  - keep on tele  - mgmt of pancreatitis per primary/gen surg  - general cardiology to sign off, f/u w/ advanced heart failure for further recommendations    Betito Nguyen MD  Cardiology Fellow - PGY6    For all New Consults and Questions:  [email or web address]   Login: cardfellows    *** Recommendations are preliminary until cosigned by the attending. This is a 63yo Male with PMHx of CABG x4 in [facility] ~3yrs ago, T2DM, HTN, HLD, who presented initially to [place] ED for one day of abdominal pain, nausea, vomiting. Found to have abnormal EKG and was transferred here for further evaluation. Labs at OSH notable for Lipase of >30,000, WBC 20, Cr 2.27, , , Troponin I 234, Covid-19 negative. Afebrile, HR 110s, BPs 110s/70s. Cannulated for ECMO 11/25 after cardiac arrest (ROSC after 30 min).    Recommendations:  - repeat EKG PRN for clinical changes  - trend trops to peak  - TTE 11/25: EF 43%, Moderately dilated left atrium, Moderate segmental left ventricular systolic dysfunction. Akinesis of the  mid-basal anterolateral wall, inferior and inferolateral wall with all remaining walls hypokinetic, Moderate diastolic dysfunction (Stage II).  - currently on ECMO, based on history of CAD pt would benefit from DAPT if able, defer risk/benefits/alternatives to primary and Neurosurgical Team team, c/t hep gtt  - keep on tele  - mgmt of pancreatitis per primary/gen surg  - general cardiology to sign off, f/u w/ advanced heart failure for further recommendations    Betito Nguyen MD  Cardiology Fellow - PGY6    For all New Consults and Questions:  [email or web address]   Login: cardfellows    *** Recommendations are preliminary until cosigned by the attending. This is a 63yo Male with PMHx of CABG x4 in [facility] ~3yrs ago, T2DM, HTN, HLD, who presented initially to [place] ED for one day of abdominal pain, nausea, vomiting. Found to have abnormal EKG and was transferred here for further evaluation. Labs at OSH notable for Lipase of >30,000, WBC 20, Cr 2.27, , , Troponin I 234, Covid-19 negative. Afebrile, HR 110s, BPs 110s/70s. Cannulated for ECMO 11/25 after cardiac arrest (ROSC after 30 min).    Recommendations:  - repeat EKG PRN for clinical changes  - trend trops to peak  - TTE 11/25: EF 43%, Moderately dilated left atrium, Moderate segmental left ventricular systolic dysfunction. Akinesis of the  mid-basal anterolateral wall, inferior and inferolateral wall with all remaining walls hypokinetic, Moderate diastolic dysfunction (Stage II).  - currently on ECMO, based on history of CAD pt would benefit from DAPT if able, defer risk/benefits/alternatives to primary and Neurosurgical Team team, c/t hep gtt  - keep on tele  - mgmt of pancreatitis per primary/gen surg  - general cardiology to sign off, f/u w/ advanced heart failure for further recommendations    Betito Nguyen MD  Cardiology Fellow - PGY6    For all New Consults and Questions:  [email or web address]   Login: cardfellows    *** Recommendations are preliminary until cosigned by the attending. This is a 63yo Male with PMHx of CABG x4 in [facility] ~3yrs ago, T2DM, HTN, HLD, who presented initially to [place] ED for one day of abdominal pain, nausea, vomiting. Found to have abnormal EKG and was transferred here for further evaluation. Labs at OSH notable for Lipase of >30,000, WBC 20, Cr 2.27, , , Troponin I 234, Covid-19 negative. Afebrile, HR 110s, BPs 110s/70s. Cannulated for ECMO 11/25 after cardiac arrest (ROSC after 30 min).    Recommendations:  - repeat EKG PRN for clinical changes  - trend trops to peak  - TTE 11/25: EF 43%, Moderately dilated left atrium, Moderate segmental left ventricular systolic dysfunction. Akinesis of the  mid-basal anterolateral wall, inferior and inferolateral wall with all remaining walls hypokinetic, Moderate diastolic dysfunction (Stage II).  - currently on ECMO, based on history of CAD pt would benefit from DAPT if able, defer risk/benefits/alternatives to primary and Neurosurgical Team team, c/t hep gtt  - keep on tele  - mgmt of pancreatitis per primary/gen surg  - general cardiology to sign off, f/u w/ advanced heart failure for further recommendations    Betito Nguyen MD  Cardiology Fellow - PGY6    For all New Consults and Questions:  [email or web address]   Login: cardfellows    *** Recommendations are preliminary until cosigned by the attending.

## 2022-11-26 NOTE — CONSULT NOTE ADULT - ATTENDING COMMENTS
History/PE as noted. Patient seen and examined. Admitted with episode of acute gallstone pancreatitis. Clinical course complicated by cardiac arrest requiring 30 minutes CPR. Now with elevated liver enzymes with dominant pattern suggestive of ischemic hepatopathy. Recommendations as noted- monitor liver enzymes. Currently on ECMO. No plan for urgent ERCP or other endoscopic evaluation at current time.

## 2022-11-26 NOTE — PROGRESS NOTE ADULT - SUBJECTIVE AND OBJECTIVE BOX
EDUARDO REAL  MRN-[id number]  Patient is a 62y old  Male who presents with a chief complaint of Acute cholecystitis, gallstone pancreatitis (2022 13:27)    HPI:  Patient is a 63 y/o male with PMH of CABG, DM, HTN, HLD presenting as transfer from [place] for c/f STEMI. PTA arrival received 325 aspirin, 600 plavix, and no heparin drip started. Around 1:30 am patient had multiple episodes of non-bloody diarrhea and emesis with associated abdominal pain and chest pain, unable to localize, non-radiating, with associated SOB and no associated palpitations or diaphoresis. No recent fevers/chills, cough, rashes, or changes in urination. Does report mild diffuse back pain; started 5 days ago in setting on injury while walking and lifting objects. Denies focal weakness, numbness/tingling, or LOC due does report mild dizziness.    Patient seen and examined in ED. Hemodynamically stable, alert and awake, A&Ox3. Daughter at bedside. Reports abdominal pain, and nausea. Abdomen is soft, tender in epigastric area and RUQ. Denies chest pain, SOB. WBC 20, T.bili 3.4, Lipase 300. RUQ US demonstrated gallbladder stones, pericholecystic fluid.  (2022 15:10)    24 HOUR EVENTS:  Cardiac arrest yesterday with ECMO cannulation. Subdural discovered overnight, Neurosurgery following. Troponins have uptrended and the patient's LVEF has decreased from 40% to 8%.     REVIEW OF SYSTEMS:  Unable to obtain due to being intubated    ICU Vital Signs Last 24 Hrs  T(C): 36.6 (2022 12:00), Max: 36.8 (2022 08:00)  T(F): 97.9 (2022 12:00), Max: 98.2 (2022 08:00)  HR: 82 (2022 13:45) (0 - 148)  BP: --  BP(mean): --  ABP: 88/80 (2022 13:45) (35/9 - 227/102)  ABP(mean): 84 (2022 13:45) (24 - 189)  RR: 16 (2022 13:00) (0 - 127)  SpO2: 100% (2022 13:45) (67% - 100%)    O2 Parameters below as of 2022 12:00  Patient On (Oxygen Delivery Method): ventilator    O2 Concentration (%): 40    Mode: AC/ CMV (Assist Control/ Continuous Mandatory Ventilation), RR (machine): 14, TV (machine): 400, FiO2: 40, PEEP: 14, ITime: 1  CVP(mm Hg): 5 (22 @ 13:45) (4 - 12)    I&O's Summary    2022 07:01  -  2022 07:00  --------------------------------------------------------  IN: 4012.8 mL / OUT: 2440 mL / NET: 1572.8 mL    2022 07:01  -  2022 13:58  --------------------------------------------------------  IN: 674.9 mL / OUT: 2650 mL / NET: -1975.1 mL    CAPILLARY BLOOD GLUCOSE  116 (2022 14:00)    POCT Blood Glucose.: 101 mg/dL (2022 13:32)    PHYSICAL EXAM:  GENERAL: No acute distress  CHEST/LUNG: Diffuse rhonchi  HEART: Regular rate and rhythm. No murmurs, rubs, or gallops  ABDOMEN: Soft, non-tender, non-distended; normal bowel sounds, no organomegaly  EXTREMITIES:  1+ peripheral pulses b/l, No clubbing, cyanosis, or edema  NEUROLOGY: Sedated, moves all extremities; PERRLA  SKIN: No rashes or lesions    ============================I/O===========================   I&O's Detail    :01  -  2022 07:00  --------------------------------------------------------  IN:    Albumin 5% - 50 mL: 300 mL    Amiodarone: 75.1 mL    EPINEPHrine: 52.2 mL    EPINEPHrine: 243.6 mL    EPINEPHrine: 80.2 mL    Furosemide: 3.8 mL    Heparin: 35 mL    Heparin: 60 mL    Heparin Infusion: 50 mL    Insulin: 120 mL    IV PiggyBack: 950 mL    IV PiggyBack: 512.5 mL    Lactated Ringers: 1000 mL    Norepinephrine: 104.7 mL    Phenylephrine: 31.4 mL    Propofol: 144.3 mL    sodium chloride 0.9%: 70 mL    Vasopressin: 180 mL  Total IN: 4012.8 mL    OUT:    Dexmedetomidine: 0 mL    Indwelling Catheter - Urethral (mL): 1915 mL    Nasogastric/Oral tube (mL): 50 mL    Norepinephrine: 0 mL    Voided (mL): 475 mL  Total OUT: 2440 mL    Total NET: 1572.8 mL      2022 07:01  -  2022 13:58  --------------------------------------------------------  IN:    Dexmedetomidine: 139.2 mL    EPINEPHrine: 104.5 mL    Furosemide: 8.8 mL    Heparin: 37 mL    Insulin: 43.5 mL    IV PiggyBack: 200 mL    Propofol: 60.9 mL    sodium chloride 0.9%: 70 mL    Vasopressin: 11 mL  Total IN: 674.9 mL    OUT:    Indwelling Catheter - Urethral (mL): 2650 mL    Norepinephrine: 0 mL  Total OUT: 2650 mL    Total NET: -1975.1 mL      ============================ LABS =========================                        9.8    18.25 )-----------( 107      ( 2022 01:53 )             28.8         151<H>  |  101  |  39<H>  ----------------------------<  271<H>  3.1<L>   |  25  |  2.83<H>    Ca    9.8      2022 01:53  Phos  2.6       Mg     2.7         TPro  5.2<L>  /  Alb  3.2<L>  /  TBili  2.4<H>  /  DBili  x   /  AST  721<H>  /  ALT  354<H>  /  AlkPhos  56      Troponin T, High Sensitivity Result: >93043 ng/L (22 @ :53)  Troponin T, High Sensitivity Result: >53565 ng/L (22 @ 20:05)  Troponin T, High Sensitivity Result: 3464 ng/L (22 @ 16:11)  Troponin T, High Sensitivity Result: 3425 ng/L (22 @ 11:53)  Troponin T, High Sensitivity Result: 2445 ng/L (22 @ 03:59)  Troponin T, High Sensitivity Result: 1403 ng/L (22 @ 21:28)  Troponin T, High Sensitivity Result: 455 ng/L (22 @ 15:18)  Troponin T, High Sensitivity Result: 207 ng/L (22 @ 11:31)  Troponin T, High Sensitivity Result: 87 ng/L (22 @ 07:57)    CKMB Units: 247.6 ng/mL (22 @ :53)  CKMB Units: 197.9 ng/mL (22 @ 20:05)  CKMB Units: 113.4 ng/mL (22 @ 11:53)  CKMB Units: 9.5 ng/mL (22 @ 07:57)    Creatine Kinase, Serum: 2856 U/L (22 @ :53)  Creatine Kinase, Serum: 2029 U/L (22 @ 20:05)  Creatine Kinase, Serum: 1598 U/L (22 @ 11:53)  Creatine Kinase, Serum: 224 U/L (22 @ 07:57)    CPK Mass Assay %: 8.7 % (22 @ 01:53)  CPK Mass Assay %: 9.8 % (22 @ 20:05)  CPK Mass Assay %: 7.1 % (22 @ 11:53)  CPK Mass Assay %: 4.2 % (22 @ 07:57)        LIVER FUNCTIONS - ( 2022 01:53 )  Alb: 3.2 g/dL / Pro: 5.2 g/dL / ALK PHOS: 56 U/L / ALT: 354 U/L / AST: 721 U/L / GGT: x           PT/INR - ( 2022 01:45 )   PT: 17.2 sec;   INR: 1.48 ratio       PTT - ( 2022 01:45 )  PTT:37.2 sec  ABG - ( 2022 12:22 )  pH, Arterial: 7.55  pH, Blood: x     /  pCO2: 41    /  pO2: 303   / HCO3: 36    / Base Excess: 12.4  /  SaO2: 96.9      Blood Gas Arterial, Lactate: 3.7 mmol/L (22 @ 12:22)  Blood Gas Arterial, Lactate: 5.3 mmol/L (22 @ 08:53)  Blood Gas Arterial, Lactate: 6.5 mmol/L (22 @ 05:15)  Blood Gas Arterial, Lactate: 9.1 mmol/L (22 @ 04:14)  Blood Gas Arterial, Lactate: 10.5 mmol/L (22 @ 02:40)  Blood Gas Venous - Lactate: 9.6 mmol/L (22 @ 02:40)  Blood Gas Arterial, Lactate: 13.3 mmol/L (22 @ 00:35)  Blood Gas Arterial, Lactate: 15.0 mmol/L (22 @ 21:44)  Blood Gas Arterial, Lactate: 15.4 mmol/L (22 @ 20:30)  Blood Gas Arterial, Lactate: 15.5 mmol/L (22 @ 20:07)  Blood Gas Arterial, Lactate: >16.0 mmol/L (22 @ 19:15)  Blood Gas Arterial, Lactate: 15.0 mmol/L (22 @ 18:57)  Blood Gas Arterial, Lactate: >16.0 mmol/L (22 @ 18:30)  Blood Gas Arterial, Lactate: 15.8 mmol/L (22 @ 18:18)  Blood Gas Arterial, Lactate: >16.0 mmol/L (22 @ 17:32)  Blood Gas Arterial, Lactate: 15.1 mmol/L (22 @ 17:00)  Blood Gas Arterial, Lactate: >16.0 mmol/L (22 @ 16:47)  Blood Gas Arterial, Lactate: 5.5 mmol/L (22 @ 16:01)  Blood Gas Arterial, Lactate: 1.4 mmol/L (22 @ 15:00)  Blood Gas Arterial, Lactate: 1.4 mmol/L (22 @ 11:49)  Blood Gas Arterial, Lactate: 1.2 mmol/L (22 @ 09:40)  Blood Gas Arterial, Lactate: 1.2 mmol/L (22 @ 08:35)  Blood Gas Venous - Lactate: 2.7 mmol/L (22 @ 07:55)    Urinalysis Basic - ( 2022 10:37 )    Color: Yellow / Appearance: Slightly Turbid / S.027 / pH: x  Gluc: x / Ketone: Trace  / Bili: Negative / Urobili: Negative   Blood: x / Protein: 30 mg/dL / Nitrite: Negative   Leuk Esterase: Negative / RBC: 7 /hpf / WBC 7 /HPF   Sq Epi: x / Non Sq Epi: 2 /hpf / Bacteria: Negative   EDUARDO REAL  MRN-[id number]  Patient is a 62y old  Male who presents with a chief complaint of Acute cholecystitis, gallstone pancreatitis (2022 13:27)    HPI:  Patient is a 61 y/o male with PMH of CABG, DM, HTN, HLD presenting as transfer from [place] for c/f STEMI. PTA arrival received 325 aspirin, 600 plavix, and no heparin drip started. Around 1:30 am patient had multiple episodes of non-bloody diarrhea and emesis with associated abdominal pain and chest pain, unable to localize, non-radiating, with associated SOB and no associated palpitations or diaphoresis. No recent fevers/chills, cough, rashes, or changes in urination. Does report mild diffuse back pain; started 5 days ago in setting on injury while walking and lifting objects. Denies focal weakness, numbness/tingling, or LOC due does report mild dizziness.    Patient seen and examined in ED. Hemodynamically stable, alert and awake, A&Ox3. Daughter at bedside. Reports abdominal pain, and nausea. Abdomen is soft, tender in epigastric area and RUQ. Denies chest pain, SOB. WBC 20, T.bili 3.4, Lipase 300. RUQ US demonstrated gallbladder stones, pericholecystic fluid.  (2022 15:10)    24 HOUR EVENTS:  Cardiac arrest yesterday with ECMO cannulation. Subdural discovered overnight, Neurosurgery following. Troponins have uptrended and the patient's LVEF has decreased from 40% to 8%.     REVIEW OF SYSTEMS:  Unable to obtain due to being intubated    ICU Vital Signs Last 24 Hrs  T(C): 36.6 (2022 12:00), Max: 36.8 (2022 08:00)  T(F): 97.9 (2022 12:00), Max: 98.2 (2022 08:00)  HR: 82 (2022 13:45) (0 - 148)  BP: --  BP(mean): --  ABP: 88/80 (2022 13:45) (35/9 - 227/102)  ABP(mean): 84 (2022 13:45) (24 - 189)  RR: 16 (2022 13:00) (0 - 127)  SpO2: 100% (2022 13:45) (67% - 100%)    O2 Parameters below as of 2022 12:00  Patient On (Oxygen Delivery Method): ventilator    O2 Concentration (%): 40    Mode: AC/ CMV (Assist Control/ Continuous Mandatory Ventilation), RR (machine): 14, TV (machine): 400, FiO2: 40, PEEP: 14, ITime: 1  CVP(mm Hg): 5 (22 @ 13:45) (4 - 12)    I&O's Summary    2022 07:01  -  2022 07:00  --------------------------------------------------------  IN: 4012.8 mL / OUT: 2440 mL / NET: 1572.8 mL    2022 07:01  -  2022 13:58  --------------------------------------------------------  IN: 674.9 mL / OUT: 2650 mL / NET: -1975.1 mL    CAPILLARY BLOOD GLUCOSE  116 (2022 14:00)    POCT Blood Glucose.: 101 mg/dL (2022 13:32)    PHYSICAL EXAM:  GENERAL: No acute distress  CHEST/LUNG: Diffuse rhonchi  HEART: Regular rate and rhythm. No murmurs, rubs, or gallops  ABDOMEN: Soft, non-tender, non-distended; normal bowel sounds, no organomegaly  EXTREMITIES:  1+ peripheral pulses b/l, No clubbing, cyanosis, or edema  NEUROLOGY: Sedated, moves all extremities; PERRLA  SKIN: No rashes or lesions    ============================I/O===========================   I&O's Detail    :01  -  2022 07:00  --------------------------------------------------------  IN:    Albumin 5% - 50 mL: 300 mL    Amiodarone: 75.1 mL    EPINEPHrine: 52.2 mL    EPINEPHrine: 243.6 mL    EPINEPHrine: 80.2 mL    Furosemide: 3.8 mL    Heparin: 35 mL    Heparin: 60 mL    Heparin Infusion: 50 mL    Insulin: 120 mL    IV PiggyBack: 950 mL    IV PiggyBack: 512.5 mL    Lactated Ringers: 1000 mL    Norepinephrine: 104.7 mL    Phenylephrine: 31.4 mL    Propofol: 144.3 mL    sodium chloride 0.9%: 70 mL    Vasopressin: 180 mL  Total IN: 4012.8 mL    OUT:    Dexmedetomidine: 0 mL    Indwelling Catheter - Urethral (mL): 1915 mL    Nasogastric/Oral tube (mL): 50 mL    Norepinephrine: 0 mL    Voided (mL): 475 mL  Total OUT: 2440 mL    Total NET: 1572.8 mL      2022 07:01  -  2022 13:58  --------------------------------------------------------  IN:    Dexmedetomidine: 139.2 mL    EPINEPHrine: 104.5 mL    Furosemide: 8.8 mL    Heparin: 37 mL    Insulin: 43.5 mL    IV PiggyBack: 200 mL    Propofol: 60.9 mL    sodium chloride 0.9%: 70 mL    Vasopressin: 11 mL  Total IN: 674.9 mL    OUT:    Indwelling Catheter - Urethral (mL): 2650 mL    Norepinephrine: 0 mL  Total OUT: 2650 mL    Total NET: -1975.1 mL      ============================ LABS =========================                        9.8    18.25 )-----------( 107      ( 2022 01:53 )             28.8         151<H>  |  101  |  39<H>  ----------------------------<  271<H>  3.1<L>   |  25  |  2.83<H>    Ca    9.8      2022 01:53  Phos  2.6       Mg     2.7         TPro  5.2<L>  /  Alb  3.2<L>  /  TBili  2.4<H>  /  DBili  x   /  AST  721<H>  /  ALT  354<H>  /  AlkPhos  56      Troponin T, High Sensitivity Result: >46850 ng/L (22 @ :53)  Troponin T, High Sensitivity Result: >87438 ng/L (22 @ 20:05)  Troponin T, High Sensitivity Result: 3464 ng/L (22 @ 16:11)  Troponin T, High Sensitivity Result: 3425 ng/L (22 @ 11:53)  Troponin T, High Sensitivity Result: 2445 ng/L (22 @ 03:59)  Troponin T, High Sensitivity Result: 1403 ng/L (22 @ 21:28)  Troponin T, High Sensitivity Result: 455 ng/L (22 @ 15:18)  Troponin T, High Sensitivity Result: 207 ng/L (22 @ 11:31)  Troponin T, High Sensitivity Result: 87 ng/L (22 @ 07:57)    CKMB Units: 247.6 ng/mL (22 @ :53)  CKMB Units: 197.9 ng/mL (22 @ 20:05)  CKMB Units: 113.4 ng/mL (22 @ 11:53)  CKMB Units: 9.5 ng/mL (22 @ 07:57)    Creatine Kinase, Serum: 2856 U/L (22 @ :53)  Creatine Kinase, Serum: 2029 U/L (22 @ 20:05)  Creatine Kinase, Serum: 1598 U/L (22 @ 11:53)  Creatine Kinase, Serum: 224 U/L (22 @ 07:57)    CPK Mass Assay %: 8.7 % (22 @ 01:53)  CPK Mass Assay %: 9.8 % (22 @ 20:05)  CPK Mass Assay %: 7.1 % (22 @ 11:53)  CPK Mass Assay %: 4.2 % (22 @ 07:57)        LIVER FUNCTIONS - ( 2022 01:53 )  Alb: 3.2 g/dL / Pro: 5.2 g/dL / ALK PHOS: 56 U/L / ALT: 354 U/L / AST: 721 U/L / GGT: x           PT/INR - ( 2022 01:45 )   PT: 17.2 sec;   INR: 1.48 ratio       PTT - ( 2022 01:45 )  PTT:37.2 sec  ABG - ( 2022 12:22 )  pH, Arterial: 7.55  pH, Blood: x     /  pCO2: 41    /  pO2: 303   / HCO3: 36    / Base Excess: 12.4  /  SaO2: 96.9      Blood Gas Arterial, Lactate: 3.7 mmol/L (22 @ 12:22)  Blood Gas Arterial, Lactate: 5.3 mmol/L (22 @ 08:53)  Blood Gas Arterial, Lactate: 6.5 mmol/L (22 @ 05:15)  Blood Gas Arterial, Lactate: 9.1 mmol/L (22 @ 04:14)  Blood Gas Arterial, Lactate: 10.5 mmol/L (22 @ 02:40)  Blood Gas Venous - Lactate: 9.6 mmol/L (22 @ 02:40)  Blood Gas Arterial, Lactate: 13.3 mmol/L (22 @ 00:35)  Blood Gas Arterial, Lactate: 15.0 mmol/L (22 @ 21:44)  Blood Gas Arterial, Lactate: 15.4 mmol/L (22 @ 20:30)  Blood Gas Arterial, Lactate: 15.5 mmol/L (22 @ 20:07)  Blood Gas Arterial, Lactate: >16.0 mmol/L (22 @ 19:15)  Blood Gas Arterial, Lactate: 15.0 mmol/L (22 @ 18:57)  Blood Gas Arterial, Lactate: >16.0 mmol/L (22 @ 18:30)  Blood Gas Arterial, Lactate: 15.8 mmol/L (22 @ 18:18)  Blood Gas Arterial, Lactate: >16.0 mmol/L (22 @ 17:32)  Blood Gas Arterial, Lactate: 15.1 mmol/L (22 @ 17:00)  Blood Gas Arterial, Lactate: >16.0 mmol/L (22 @ 16:47)  Blood Gas Arterial, Lactate: 5.5 mmol/L (22 @ 16:01)  Blood Gas Arterial, Lactate: 1.4 mmol/L (22 @ 15:00)  Blood Gas Arterial, Lactate: 1.4 mmol/L (22 @ 11:49)  Blood Gas Arterial, Lactate: 1.2 mmol/L (22 @ 09:40)  Blood Gas Arterial, Lactate: 1.2 mmol/L (22 @ 08:35)  Blood Gas Venous - Lactate: 2.7 mmol/L (22 @ 07:55)    Urinalysis Basic - ( 2022 10:37 )    Color: Yellow / Appearance: Slightly Turbid / S.027 / pH: x  Gluc: x / Ketone: Trace  / Bili: Negative / Urobili: Negative   Blood: x / Protein: 30 mg/dL / Nitrite: Negative   Leuk Esterase: Negative / RBC: 7 /hpf / WBC 7 /HPF   Sq Epi: x / Non Sq Epi: 2 /hpf / Bacteria: Negative   EDUARDO REAL  MRN-[id number]  Patient is a 62y old  Male who presents with a chief complaint of Acute cholecystitis, gallstone pancreatitis (2022 13:27)    HPI:  Patient is a 61 y/o male with PMH of CABG, DM, HTN, HLD presenting as transfer from [place] for c/f STEMI. PTA arrival received 325 aspirin, 600 plavix, and no heparin drip started. Around 1:30 am patient had multiple episodes of non-bloody diarrhea and emesis with associated abdominal pain and chest pain, unable to localize, non-radiating, with associated SOB and no associated palpitations or diaphoresis. No recent fevers/chills, cough, rashes, or changes in urination. Does report mild diffuse back pain; started 5 days ago in setting on injury while walking and lifting objects. Denies focal weakness, numbness/tingling, or LOC due does report mild dizziness.    Patient seen and examined in ED. Hemodynamically stable, alert and awake, A&Ox3. Daughter at bedside. Reports abdominal pain, and nausea. Abdomen is soft, tender in epigastric area and RUQ. Denies chest pain, SOB. WBC 20, T.bili 3.4, Lipase 300. RUQ US demonstrated gallbladder stones, pericholecystic fluid.  (2022 15:10)    24 HOUR EVENTS:  Cardiac arrest yesterday with ECMO cannulation. Subdural discovered overnight, Neurosurgery following. Troponins have uptrended and the patient's LVEF has decreased from 40% to 8%.     REVIEW OF SYSTEMS:  Unable to obtain due to being intubated    ICU Vital Signs Last 24 Hrs  T(C): 36.6 (2022 12:00), Max: 36.8 (2022 08:00)  T(F): 97.9 (2022 12:00), Max: 98.2 (2022 08:00)  HR: 82 (2022 13:45) (0 - 148)  BP: --  BP(mean): --  ABP: 88/80 (2022 13:45) (35/9 - 227/102)  ABP(mean): 84 (2022 13:45) (24 - 189)  RR: 16 (2022 13:00) (0 - 127)  SpO2: 100% (2022 13:45) (67% - 100%)    O2 Parameters below as of 2022 12:00  Patient On (Oxygen Delivery Method): ventilator    O2 Concentration (%): 40    Mode: AC/ CMV (Assist Control/ Continuous Mandatory Ventilation), RR (machine): 14, TV (machine): 400, FiO2: 40, PEEP: 14, ITime: 1  CVP(mm Hg): 5 (22 @ 13:45) (4 - 12)    I&O's Summary    2022 07:01  -  2022 07:00  --------------------------------------------------------  IN: 4012.8 mL / OUT: 2440 mL / NET: 1572.8 mL    2022 07:01  -  2022 13:58  --------------------------------------------------------  IN: 674.9 mL / OUT: 2650 mL / NET: -1975.1 mL    CAPILLARY BLOOD GLUCOSE  116 (2022 14:00)    POCT Blood Glucose.: 101 mg/dL (2022 13:32)    PHYSICAL EXAM:  GENERAL: No acute distress  CHEST/LUNG: Diffuse rhonchi  HEART: Regular rate and rhythm. No murmurs, rubs, or gallops  ABDOMEN: Soft, non-tender, non-distended; normal bowel sounds, no organomegaly  EXTREMITIES:  1+ peripheral pulses b/l, No clubbing, cyanosis, or edema  NEUROLOGY: Sedated, moves all extremities; PERRLA  SKIN: No rashes or lesions    ============================I/O===========================   I&O's Detail    :01  -  2022 07:00  --------------------------------------------------------  IN:    Albumin 5% - 50 mL: 300 mL    Amiodarone: 75.1 mL    EPINEPHrine: 52.2 mL    EPINEPHrine: 243.6 mL    EPINEPHrine: 80.2 mL    Furosemide: 3.8 mL    Heparin: 35 mL    Heparin: 60 mL    Heparin Infusion: 50 mL    Insulin: 120 mL    IV PiggyBack: 950 mL    IV PiggyBack: 512.5 mL    Lactated Ringers: 1000 mL    Norepinephrine: 104.7 mL    Phenylephrine: 31.4 mL    Propofol: 144.3 mL    sodium chloride 0.9%: 70 mL    Vasopressin: 180 mL  Total IN: 4012.8 mL    OUT:    Dexmedetomidine: 0 mL    Indwelling Catheter - Urethral (mL): 1915 mL    Nasogastric/Oral tube (mL): 50 mL    Norepinephrine: 0 mL    Voided (mL): 475 mL  Total OUT: 2440 mL    Total NET: 1572.8 mL      2022 07:01  -  2022 13:58  --------------------------------------------------------  IN:    Dexmedetomidine: 139.2 mL    EPINEPHrine: 104.5 mL    Furosemide: 8.8 mL    Heparin: 37 mL    Insulin: 43.5 mL    IV PiggyBack: 200 mL    Propofol: 60.9 mL    sodium chloride 0.9%: 70 mL    Vasopressin: 11 mL  Total IN: 674.9 mL    OUT:    Indwelling Catheter - Urethral (mL): 2650 mL    Norepinephrine: 0 mL  Total OUT: 2650 mL    Total NET: -1975.1 mL      ============================ LABS =========================                        9.8    18.25 )-----------( 107      ( 2022 01:53 )             28.8         151<H>  |  101  |  39<H>  ----------------------------<  271<H>  3.1<L>   |  25  |  2.83<H>    Ca    9.8      2022 01:53  Phos  2.6       Mg     2.7         TPro  5.2<L>  /  Alb  3.2<L>  /  TBili  2.4<H>  /  DBili  x   /  AST  721<H>  /  ALT  354<H>  /  AlkPhos  56      Troponin T, High Sensitivity Result: >29783 ng/L (22 @ :53)  Troponin T, High Sensitivity Result: >67701 ng/L (22 @ 20:05)  Troponin T, High Sensitivity Result: 3464 ng/L (22 @ 16:11)  Troponin T, High Sensitivity Result: 3425 ng/L (22 @ 11:53)  Troponin T, High Sensitivity Result: 2445 ng/L (22 @ 03:59)  Troponin T, High Sensitivity Result: 1403 ng/L (22 @ 21:28)  Troponin T, High Sensitivity Result: 455 ng/L (22 @ 15:18)  Troponin T, High Sensitivity Result: 207 ng/L (22 @ 11:31)  Troponin T, High Sensitivity Result: 87 ng/L (22 @ 07:57)    CKMB Units: 247.6 ng/mL (22 @ :53)  CKMB Units: 197.9 ng/mL (22 @ 20:05)  CKMB Units: 113.4 ng/mL (22 @ 11:53)  CKMB Units: 9.5 ng/mL (22 @ 07:57)    Creatine Kinase, Serum: 2856 U/L (22 @ :53)  Creatine Kinase, Serum: 2029 U/L (22 @ 20:05)  Creatine Kinase, Serum: 1598 U/L (22 @ 11:53)  Creatine Kinase, Serum: 224 U/L (22 @ 07:57)    CPK Mass Assay %: 8.7 % (22 @ 01:53)  CPK Mass Assay %: 9.8 % (22 @ 20:05)  CPK Mass Assay %: 7.1 % (22 @ 11:53)  CPK Mass Assay %: 4.2 % (22 @ 07:57)        LIVER FUNCTIONS - ( 2022 01:53 )  Alb: 3.2 g/dL / Pro: 5.2 g/dL / ALK PHOS: 56 U/L / ALT: 354 U/L / AST: 721 U/L / GGT: x           PT/INR - ( 2022 01:45 )   PT: 17.2 sec;   INR: 1.48 ratio       PTT - ( 2022 01:45 )  PTT:37.2 sec  ABG - ( 2022 12:22 )  pH, Arterial: 7.55  pH, Blood: x     /  pCO2: 41    /  pO2: 303   / HCO3: 36    / Base Excess: 12.4  /  SaO2: 96.9      Blood Gas Arterial, Lactate: 3.7 mmol/L (22 @ 12:22)  Blood Gas Arterial, Lactate: 5.3 mmol/L (22 @ 08:53)  Blood Gas Arterial, Lactate: 6.5 mmol/L (22 @ 05:15)  Blood Gas Arterial, Lactate: 9.1 mmol/L (22 @ 04:14)  Blood Gas Arterial, Lactate: 10.5 mmol/L (22 @ 02:40)  Blood Gas Venous - Lactate: 9.6 mmol/L (22 @ 02:40)  Blood Gas Arterial, Lactate: 13.3 mmol/L (22 @ 00:35)  Blood Gas Arterial, Lactate: 15.0 mmol/L (22 @ 21:44)  Blood Gas Arterial, Lactate: 15.4 mmol/L (22 @ 20:30)  Blood Gas Arterial, Lactate: 15.5 mmol/L (22 @ 20:07)  Blood Gas Arterial, Lactate: >16.0 mmol/L (22 @ 19:15)  Blood Gas Arterial, Lactate: 15.0 mmol/L (22 @ 18:57)  Blood Gas Arterial, Lactate: >16.0 mmol/L (22 @ 18:30)  Blood Gas Arterial, Lactate: 15.8 mmol/L (22 @ 18:18)  Blood Gas Arterial, Lactate: >16.0 mmol/L (22 @ 17:32)  Blood Gas Arterial, Lactate: 15.1 mmol/L (22 @ 17:00)  Blood Gas Arterial, Lactate: >16.0 mmol/L (22 @ 16:47)  Blood Gas Arterial, Lactate: 5.5 mmol/L (22 @ 16:01)  Blood Gas Arterial, Lactate: 1.4 mmol/L (22 @ 15:00)  Blood Gas Arterial, Lactate: 1.4 mmol/L (22 @ 11:49)  Blood Gas Arterial, Lactate: 1.2 mmol/L (22 @ 09:40)  Blood Gas Arterial, Lactate: 1.2 mmol/L (22 @ 08:35)  Blood Gas Venous - Lactate: 2.7 mmol/L (22 @ 07:55)    Urinalysis Basic - ( 2022 10:37 )    Color: Yellow / Appearance: Slightly Turbid / S.027 / pH: x  Gluc: x / Ketone: Trace  / Bili: Negative / Urobili: Negative   Blood: x / Protein: 30 mg/dL / Nitrite: Negative   Leuk Esterase: Negative / RBC: 7 /hpf / WBC 7 /HPF   Sq Epi: x / Non Sq Epi: 2 /hpf / Bacteria: Negative

## 2022-11-26 NOTE — PROGRESS NOTE ADULT - SUBJECTIVE AND OBJECTIVE BOX
CRITICAL CARE ATTENDING - CTICU    MEDICATIONS  (STANDING):  chlorhexidine 0.12% Liquid 15 milliLiter(s) Oral Mucosa every 12 hours  chlorhexidine 2% Cloths 1 Application(s) Topical <User Schedule>  chlorhexidine 4% Liquid 1 Application(s) Topical <User Schedule>  dextrose 50% Injectable 50 milliLiter(s) IV Push every 15 minutes  EPINEPHrine    Infusion 0.07 MICROgram(s)/kG/Min (24.4 mL/Hr) IV Continuous <Continuous>  furosemide Infusion 2.5 mG/Hr (1.25 mL/Hr) IV Continuous <Continuous>  heparin  Infusion 500 Unit(s)/Hr (5 mL/Hr) IV Continuous <Continuous>  hydrocortisone sodium succinate Injectable 100 milliGRAM(s) IV Push every 8 hours  insulin lispro (ADMELOG) corrective regimen sliding scale   SubCutaneous every 6 hours  insulin regular Infusion 6 Unit(s)/Hr (6 mL/Hr) IV Continuous <Continuous>  meropenem  IVPB 1000 milliGRAM(s) IV Intermittent every 12 hours  norepinephrine Infusion 0.05 MICROgram(s)/kG/Min (8.71 mL/Hr) IV Continuous <Continuous>  pantoprazole  Injectable 40 milliGRAM(s) IV Push two times a day  propofol Infusion 10 MICROgram(s)/kG/Min (5.57 mL/Hr) IV Continuous <Continuous>  sodium chloride 0.9%. 1000 milliLiter(s) (10 mL/Hr) IV Continuous <Continuous>  vasopressin Infusion 0.1 Unit(s)/Min (15 mL/Hr) IV Continuous <Continuous>                                    9.8    18.25 )-----------( 107      ( 26 Nov 2022 01:53 )             28.8       11-26    151<H>  |  101  |  39<H>  ----------------------------<  271<H>  3.1<L>   |  25  |  2.83<H>    Ca    9.8      26 Nov 2022 01:53  Phos  2.6     11-26  Mg     2.7     11-26    TPro  5.2<L>  /  Alb  3.2<L>  /  TBili  2.4<H>  /  DBili  x   /  AST  721<H>  /  ALT  354<H>  /  AlkPhos  56  11-26      PT/INR - ( 26 Nov 2022 01:45 )   PT: 17.2 sec;   INR: 1.48 ratio         PTT - ( 26 Nov 2022 01:45 )  PTT:37.2 sec    Mode: AC/ CMV (Assist Control/ Continuous Mandatory Ventilation)  RR (machine): 14  TV (machine): 400  FiO2: 40  PEEP: 14  ITime: 1  MAP: 17  PC: 10  PIP: 24      Daily     Daily       11-25 @ 07:01  -  11-26 @ 07:00  --------------------------------------------------------  IN: 4012.8 mL / OUT: 2440 mL / NET: 1572.8 mL        Critically Ill patient  : [ ] preoperative ,   [x] post operative    Requires :  [x] Arterial Line   [x] Central Line  [ ] PA catheter  [ ] IABP  [x] ECMO  [ ] LVAD  [x] Ventilator  [ ] pacemaker [ ] Impella.                      [x ] ABG's     [ x] Pulse Oxymetry Monitoring  Bedside evaluation , monitoring , treatment of hemodynamics , fluids , IVP/ IVCD meds.        Diagnosis:     POD 1 [facility] ECMO s/p arrest    Hypovolemia    Ventilator Management:  [x]AC-rest    [ ]CPAP-PS Wean    [ ]Trach Collar     [ ]Extubate    [ ] T-Piece  [ ]peep>5     Respiratory status required full ventilatory support, close monitoring of respiratory rate and breathing pattern, the following of ABG’s with A-line monitoring, continuous pulse oximetry monitoring     Requires chest PT, pulmonary toilet, ambu bagging, suctioning to maintain SaO2,  patent airway and treat atelectasis.     IVCD Propofol for vent synchrony    Hemodynamic lability,  instability. Requires IVCD [x] vasopressors [x] inotropes  [ ] vasodilator  [x]IVSS fluid  to maintain MAP, perfusion, C.I.      Fluid overload    CHF- acute [ x]   chronic [ x]    systolic [ x]   diatolic [ ]          - Echo- EF -   43%       [ ] RV dysfunction          - Cxr-cardiomegally, edema          - Clinical-  [x]inotropes   [x]pressors   [x]diuresis   [ ]IABP   [x]ECMO   [ ]LVAD   [ ]Respiratory Failure.    IVCD Insulin     IVCD anticoagulation with [x] Heparin  [ ] Argatroban     Thrombocytopenia    Lactic acidosis 9.1 -> 6.5    Hypernatremia    Renal Failure - Acute Kidney Injury     Requires bedside physical therapy, mobilization and total half-way care.           By signing my name below, I, Maria D'Amico, attest that this documentation has been prepared under the direction and in the presence of Finn Zelaya MD.   Electronically Signed: Maria D'Amico, Scribe 11-26-22 @ 07:05      Discussed with CT surgeon, Physician Assistant - Nurse Practitioner- Critical care medicine team.   Discussed at  AM / PM rounds.   Chart, labs , films reviewed.    Cumulative Critical Care Time Given Today:x CRITICAL CARE ATTENDING - CTICU    MEDICATIONS  (STANDING):  chlorhexidine 0.12% Liquid 15 milliLiter(s) Oral Mucosa every 12 hours  chlorhexidine 2% Cloths 1 Application(s) Topical <User Schedule>  chlorhexidine 4% Liquid 1 Application(s) Topical <User Schedule>  dextrose 50% Injectable 50 milliLiter(s) IV Push every 15 minutes  EPINEPHrine    Infusion 0.07 MICROgram(s)/kG/Min (24.4 mL/Hr) IV Continuous <Continuous>  furosemide Infusion 2.5 mG/Hr (1.25 mL/Hr) IV Continuous <Continuous>  heparin  Infusion 500 Unit(s)/Hr (5 mL/Hr) IV Continuous <Continuous>  hydrocortisone sodium succinate Injectable 100 milliGRAM(s) IV Push every 8 hours  insulin lispro (ADMELOG) corrective regimen sliding scale   SubCutaneous every 6 hours  insulin regular Infusion 6 Unit(s)/Hr (6 mL/Hr) IV Continuous <Continuous>  meropenem  IVPB 1000 milliGRAM(s) IV Intermittent every 12 hours  norepinephrine Infusion 0.05 MICROgram(s)/kG/Min (8.71 mL/Hr) IV Continuous <Continuous>  pantoprazole  Injectable 40 milliGRAM(s) IV Push two times a day  propofol Infusion 10 MICROgram(s)/kG/Min (5.57 mL/Hr) IV Continuous <Continuous>  sodium chloride 0.9%. 1000 milliLiter(s) (10 mL/Hr) IV Continuous <Continuous>  vasopressin Infusion 0.1 Unit(s)/Min (15 mL/Hr) IV Continuous <Continuous>                                    9.8    18.25 )-----------( 107      ( 26 Nov 2022 01:53 )             28.8       11-26    151<H>  |  101  |  39<H>  ----------------------------<  271<H>  3.1<L>   |  25  |  2.83<H>    Ca    9.8      26 Nov 2022 01:53  Phos  2.6     11-26  Mg     2.7     11-26    TPro  5.2<L>  /  Alb  3.2<L>  /  TBili  2.4<H>  /  DBili  x   /  AST  721<H>  /  ALT  354<H>  /  AlkPhos  56  11-26      PT/INR - ( 26 Nov 2022 01:45 )   PT: 17.2 sec;   INR: 1.48 ratio         PTT - ( 26 Nov 2022 01:45 )  PTT:37.2 sec    Mode: AC/ CMV (Assist Control/ Continuous Mandatory Ventilation)  RR (machine): 14  TV (machine): 400  FiO2: 40  PEEP: 14  ITime: 1  MAP: 17  PC: 10  PIP: 24      Daily     Daily       11-25 @ 07:01  -  11-26 @ 07:00  --------------------------------------------------------  IN: 4012.8 mL / OUT: 2440 mL / NET: 1572.8 mL        Critically Ill patient  : [ ] preoperative ,   [x] post operative    Requires :  [x] Arterial Line   [x] Central Line  [ ] PA catheter  [ ] IABP  [x] ECMO  [ ] LVAD  [x] Ventilator  [ ] pacemaker [ ] Impella.                      [x ] ABG's     [ x] Pulse Oxymetry Monitoring  Bedside evaluation , monitoring , treatment of hemodynamics , fluids , IVP/ IVCD meds.        Diagnosis:     POD 1 [facility] ECMO s/p arrest    Hypovolemia    Ventilator Management:  [x]AC-rest    [ ]CPAP-PS Wean    [ ]Trach Collar     [ ]Extubate    [ ] T-Piece  [ ]peep>5     Respiratory status required full ventilatory support, close monitoring of respiratory rate and breathing pattern, the following of ABG’s with A-line monitoring, continuous pulse oximetry monitoring     Requires chest PT, pulmonary toilet, ambu bagging, suctioning to maintain SaO2,  patent airway and treat atelectasis.     IVCD Propofol for vent synchrony    Hemodynamic lability,  instability. Requires IVCD [x] vasopressors [x] inotropes  [ ] vasodilator  [x]IVSS fluid  to maintain MAP, perfusion, C.I.      Fluid overload    CHF- acute [ x]   chronic [ x]    systolic [ x]   diatolic [ ]          - Echo- EF -   43%       [ ] RV dysfunction          - Cxr-cardiomegally, edema          - Clinical-  [x]inotropes   [x]pressors   [x]diuresis   [ ]IABP   [x]ECMO   [ ]LVAD   [ ]Respiratory Failure.    IVCD Insulin     IVCD anticoagulation with [x] Heparin  [ ] Argatroban     Thrombocytopenia    Lactic acidosis 9.1 -> 6.5    Hypernatremia    Renal Failure - Acute Kidney Injury     Requires bedside physical therapy, mobilization and total retirement care.           By signing my name below, I, Maria D'Amico, attest that this documentation has been prepared under the direction and in the presence of Finn Zelaya MD.   Electronically Signed: Maria D'Amico, Scribe 11-26-22 @ 07:05      Discussed with CT surgeon, Physician Assistant - Nurse Practitioner- Critical care medicine team.   Discussed at  AM / PM rounds.   Chart, labs , films reviewed.    Cumulative Critical Care Time Given Today:x CRITICAL CARE ATTENDING - [facility]ICU    MEDICATIONS  (STANDING):  chlorhexidine 0.12% Liquid 15 milliLiter(s) Oral Mucosa every 12 hours  chlorhexidine 2% Cloths 1 Application(s) Topical <User Schedule>  chlorhexidine 4% Liquid 1 Application(s) Topical <User Schedule>  dextrose 50% Injectable 50 milliLiter(s) IV Push every 15 minutes  EPINEPHrine    Infusion 0.07 MICROgram(s)/kG/Min (24.4 mL/Hr) IV Continuous <Continuous>  furosemide Infusion 2.5 mG/Hr (1.25 mL/Hr) IV Continuous <Continuous>  heparin  Infusion 500 Unit(s)/Hr (5 mL/Hr) IV Continuous <Continuous>  hydrocortisone sodium succinate Injectable 100 milliGRAM(s) IV Push every 8 hours  insulin lispro (ADMELOG) corrective regimen sliding scale   SubCutaneous every 6 hours  insulin regular Infusion 6 Unit(s)/Hr (6 mL/Hr) IV Continuous <Continuous>  meropenem  IVPB 1000 milliGRAM(s) IV Intermittent every 12 hours  norepinephrine Infusion 0.05 MICROgram(s)/kG/Min (8.71 mL/Hr) IV Continuous <Continuous>  pantoprazole  Injectable 40 milliGRAM(s) IV Push two times a day  propofol Infusion 10 MICROgram(s)/kG/Min (5.57 mL/Hr) IV Continuous <Continuous>  sodium chloride 0.9%. 1000 milliLiter(s) (10 mL/Hr) IV Continuous <Continuous>  vasopressin Infusion 0.1 Unit(s)/Min (15 mL/Hr) IV Continuous <Continuous>                                    9.8    18.25 )-----------( 107      ( 26 Nov 2022 01:53 )             28.8       11-26    151<H>  |  101  |  39<H>  ----------------------------<  271<H>  3.1<L>   |  25  |  2.83<H>    Ca    9.8      26 Nov 2022 01:53  Phos  2.6     11-26  Mg     2.7     11-26    TPro  5.2<L>  /  Alb  3.2<L>  /  TBili  2.4<H>  /  DBili  x   /  AST  721<H>  /  ALT  354<H>  /  AlkPhos  56  11-26      PT/INR - ( 26 Nov 2022 01:45 )   PT: 17.2 sec;   INR: 1.48 ratio         PTT - ( 26 Nov 2022 01:45 )  PTT:37.2 sec    Mode: AC/ CMV (Assist Control/ Continuous Mandatory Ventilation)  RR (machine): 14  TV (machine): 400  FiO2: 40  PEEP: 14  ITime: 1  MAP: 17  PC: 10  PIP: 24      Daily     Daily       11-25 @ 07:01  -  11-26 @ 07:00  --------------------------------------------------------  IN: 4012.8 mL / OUT: 2440 mL / NET: 1572.8 mL        Critically Ill patient  : [ ] preoperative ,   [x] post operative    Requires :  [x] Arterial Line   [x] Central Line  [ ] PA catheter  [ ] IABP  [x] ECMO  [ ] LVAD  [x] Ventilator  [ ] pacemaker [ ] Impella.                      [x ] ABG's     [ x] Pulse Oxymetry Monitoring  Bedside evaluation , monitoring , treatment of hemodynamics , fluids , IVP/ IVCD meds.        Diagnosis:     POD 1 [facility] ECMO s/p arrest    Hypovolemia    Ventilator Management:  [x]AC-rest    [ ]CPAP-PS Wean    [ ]Trach Collar     [ ]Extubate    [ ] T-Piece  [ ]peep>5     Respiratory status required full ventilatory support, close monitoring of respiratory rate and breathing pattern, the following of ABG’s with A-line monitoring, continuous pulse oximetry monitoring     Requires chest PT, pulmonary toilet, ambu bagging, suctioning to maintain SaO2,  patent airway and treat atelectasis.     IVCD Propofol for vent synchrony    Hemodynamic lability,  instability. Requires IVCD [x] vasopressors [x] inotropes  [ ] vasodilator  [x]IVSS fluid  to maintain MAP, perfusion, C.I.      Fluid overload    CHF- acute [ x]   chronic [ x]    systolic [ x]   diatolic [ ]          - Echo- EF -   43%       [ ] RV dysfunction          - Cxr-cardiomegally, edema          - Clinical-  [x]inotropes   [x]pressors   [x]diuresis   [ ]IABP   [x]ECMO   [ ]LVAD   [ ]Respiratory Failure.    IVCD Insulin     IVCD anticoagulation with [x] Heparin  [ ] Argatroban     Thrombocytopenia    Lactic acidosis 9.1 -> 6.5    Hypernatremia    Renal Failure - Acute Kidney Injury     Requires bedside physical therapy, mobilization and total [facility] care.           By signing my name below, I, Maria D'Amico, attest that this documentation has been prepared under the direction and in the presence of Finn Zelaya MD.   Electronically Signed: Maria D'Amico, Scribe 11-26-22 @ 07:05      Discussed with CT surgeon, Physician Assistant - Nurse Practitioner- Critical care medicine team.   Discussed at  AM / PM rounds.   Chart, labs , films reviewed.    Cumulative Critical Care Time Given Today:x CRITICAL CARE ATTENDING - [facility]ICU    MEDICATIONS  (STANDING):  chlorhexidine 0.12% Liquid 15 milliLiter(s) Oral Mucosa every 12 hours  chlorhexidine 2% Cloths 1 Application(s) Topical <User Schedule>  chlorhexidine 4% Liquid 1 Application(s) Topical <User Schedule>  dextrose 50% Injectable 50 milliLiter(s) IV Push every 15 minutes  EPINEPHrine    Infusion 0.07 MICROgram(s)/kG/Min (24.4 mL/Hr) IV Continuous <Continuous>  furosemide Infusion 2.5 mG/Hr (1.25 mL/Hr) IV Continuous <Continuous>  heparin  Infusion 500 Unit(s)/Hr (5 mL/Hr) IV Continuous <Continuous>  hydrocortisone sodium succinate Injectable 100 milliGRAM(s) IV Push every 8 hours  insulin lispro (ADMELOG) corrective regimen sliding scale   SubCutaneous every 6 hours  insulin regular Infusion 6 Unit(s)/Hr (6 mL/Hr) IV Continuous <Continuous>  meropenem  IVPB 1000 milliGRAM(s) IV Intermittent every 12 hours  norepinephrine Infusion 0.05 MICROgram(s)/kG/Min (8.71 mL/Hr) IV Continuous <Continuous>  pantoprazole  Injectable 40 milliGRAM(s) IV Push two times a day  propofol Infusion 10 MICROgram(s)/kG/Min (5.57 mL/Hr) IV Continuous <Continuous>  sodium chloride 0.9%. 1000 milliLiter(s) (10 mL/Hr) IV Continuous <Continuous>  vasopressin Infusion 0.1 Unit(s)/Min (15 mL/Hr) IV Continuous <Continuous>                                    9.8    18.25 )-----------( 107      ( 26 Nov 2022 01:53 )             28.8       11-26    151<H>  |  101  |  39<H>  ----------------------------<  271<H>  3.1<L>   |  25  |  2.83<H>    Ca    9.8      26 Nov 2022 01:53  Phos  2.6     11-26  Mg     2.7     11-26    TPro  5.2<L>  /  Alb  3.2<L>  /  TBili  2.4<H>  /  DBili  x   /  AST  721<H>  /  ALT  354<H>  /  AlkPhos  56  11-26      PT/INR - ( 26 Nov 2022 01:45 )   PT: 17.2 sec;   INR: 1.48 ratio         PTT - ( 26 Nov 2022 01:45 )  PTT:37.2 sec    Mode: AC/ CMV (Assist Control/ Continuous Mandatory Ventilation)  RR (machine): 14  TV (machine): 400  FiO2: 40  PEEP: 14  ITime: 1  MAP: 17  PC: 10  PIP: 24      Daily     Daily       11-25 @ 07:01  -  11-26 @ 07:00  --------------------------------------------------------  IN: 4012.8 mL / OUT: 2440 mL / NET: 1572.8 mL        Critically Ill patient  : [ ] preoperative ,   [x] post operative    Requires :  [x] Arterial Line   [x] Central Line  [ ] PA catheter  [ ] IABP  [x] ECMO - VA  [ ] LVAD  [x] Ventilator  [ ] pacemaker [ ] Impella.                      [x ] ABG's     [ x] Pulse Oxymetry Monitoring  Bedside evaluation , monitoring , treatment of hemodynamics , fluids , IVP/ IVCD meds.        Diagnosis:     POD 1 [facility] ECMO s/p arrest    Hypovolemia    Respiratory Failure     Ventilator Management:  [x]AC-rest    [ ]CPAP-PS Wean    [ ]Trach Collar     [ ]Extubate    [ ] T-Piece  [ ]peep>5     Respiratory status required full ventilatory support, close monitoring of respiratory rate and breathing pattern, the following of ABG’s with A-line monitoring, continuous pulse oximetry monitoring     Requires chest PT, pulmonary toilet, ambu bagging, suctioning to maintain SaO2,  patent airway and treat atelectasis.     ? ARDS ?     pancreatitis - gall stone     Cardiogenic Shock - EF ? 8   ?    ECMO circuit    Hemodynamic lability,  instability. Requires IVCD [x] vasopressors [x] inotropes  [ ] vasodilator  [x]IVSS fluid  to maintain MAP, perfusion, C.I.      Fluid overload    CHF- acute [ x]   chronic [ x]    systolic [ x]   diatolic [ ]          - Echo- EF -  *5       [ ] RV dysfunction          - Cxr-cardiomegally, edema          - Clinical-  [x]inotropes   [x]pressors   [x]diuresis   [ ]IABP   [x]ECMO   [ ]LVAD   [ ]Respiratory Failure.    IVCD Insulin     IVCD anticoagulation with [x] Heparin  [ ] Argatroban     Thrombocytopenia    Lactic acidosis 9.1 -> 6.5    Hypernatremia    Renal Failure - Acute Kidney Injury     Requires bedside physical therapy, mobilization and total snf care.           By signing my name below, I, Maria D'Amico, attest that this documentation has been prepared under the direction and in the presence of Finn Zelaya MD.   Electronically Signed: Maria D'Amico, Scribe 11-26-22 @ 07:05    I, Finn Zelaya, personally performed the services described in this documentation. All medical record entries made by the karlaibe were at my direction and in my presence. I have reviewed the chart and agree that the record reflects my personal performance and is accurate and complete.   Fnin Zelaya MD.       Discussed with CT surgeon, Physician Assistant - Nurse Practitioner- Critical care medicine team.   Discussed at  AM / PM rounds.   Chart, labs , films reviewed.    Cumulative Critical Care Time Given Today:  90 min CRITICAL CARE ATTENDING - CTICU    MEDICATIONS  (STANDING):  chlorhexidine 0.12% Liquid 15 milliLiter(s) Oral Mucosa every 12 hours  chlorhexidine 2% Cloths 1 Application(s) Topical <User Schedule>  chlorhexidine 4% Liquid 1 Application(s) Topical <User Schedule>  dextrose 50% Injectable 50 milliLiter(s) IV Push every 15 minutes  EPINEPHrine    Infusion 0.07 MICROgram(s)/kG/Min (24.4 mL/Hr) IV Continuous <Continuous>  furosemide Infusion 2.5 mG/Hr (1.25 mL/Hr) IV Continuous <Continuous>  heparin  Infusion 500 Unit(s)/Hr (5 mL/Hr) IV Continuous <Continuous>  hydrocortisone sodium succinate Injectable 100 milliGRAM(s) IV Push every 8 hours  insulin lispro (ADMELOG) corrective regimen sliding scale   SubCutaneous every 6 hours  insulin regular Infusion 6 Unit(s)/Hr (6 mL/Hr) IV Continuous <Continuous>  meropenem  IVPB 1000 milliGRAM(s) IV Intermittent every 12 hours  norepinephrine Infusion 0.05 MICROgram(s)/kG/Min (8.71 mL/Hr) IV Continuous <Continuous>  pantoprazole  Injectable 40 milliGRAM(s) IV Push two times a day  propofol Infusion 10 MICROgram(s)/kG/Min (5.57 mL/Hr) IV Continuous <Continuous>  sodium chloride 0.9%. 1000 milliLiter(s) (10 mL/Hr) IV Continuous <Continuous>  vasopressin Infusion 0.1 Unit(s)/Min (15 mL/Hr) IV Continuous <Continuous>                                    9.8    18.25 )-----------( 107      ( 26 Nov 2022 01:53 )             28.8       11-26    151<H>  |  101  |  39<H>  ----------------------------<  271<H>  3.1<L>   |  25  |  2.83<H>    Ca    9.8      26 Nov 2022 01:53  Phos  2.6     11-26  Mg     2.7     11-26    TPro  5.2<L>  /  Alb  3.2<L>  /  TBili  2.4<H>  /  DBili  x   /  AST  721<H>  /  ALT  354<H>  /  AlkPhos  56  11-26      PT/INR - ( 26 Nov 2022 01:45 )   PT: 17.2 sec;   INR: 1.48 ratio         PTT - ( 26 Nov 2022 01:45 )  PTT:37.2 sec    Mode: AC/ CMV (Assist Control/ Continuous Mandatory Ventilation)  RR (machine): 14  TV (machine): 400  FiO2: 40  PEEP: 14  ITime: 1  MAP: 17  PC: 10  PIP: 24      Daily     Daily       11-25 @ 07:01  -  11-26 @ 07:00  --------------------------------------------------------  IN: 4012.8 mL / OUT: 2440 mL / NET: 1572.8 mL        Critically Ill patient  : [ ] preoperative ,   [x] post operative    Requires :  [x] Arterial Line   [x] Central Line  [ ] PA catheter  [ ] IABP  [x] ECMO - VA  [ ] LVAD  [x] Ventilator  [ ] pacemaker [ ] Impella.                      [x ] ABG's     [ x] Pulse Oxymetry Monitoring  Bedside evaluation , monitoring , treatment of hemodynamics , fluids , IVP/ IVCD meds.        Diagnosis:     POD 1 [facility] ECMO s/p arrest    Hypovolemia    Respiratory Failure     Ventilator Management:  [x]AC-rest    [ ]CPAP-PS Wean    [ ]Trach Collar     [ ]Extubate    [ ] T-Piece  [ ]peep>5     Respiratory status required full ventilatory support, close monitoring of respiratory rate and breathing pattern, the following of ABG’s with A-line monitoring, continuous pulse oximetry monitoring     Requires chest PT, pulmonary toilet, ambu bagging, suctioning to maintain SaO2,  patent airway and treat atelectasis.     ? ARDS ?     pancreatitis - gall stone     Cardiogenic Shock - EF ? 8   ?    ECMO circuit    Hemodynamic lability,  instability. Requires IVCD [x] vasopressors [x] inotropes  [ ] vasodilator  [x]IVSS fluid  to maintain MAP, perfusion, C.I.      Fluid overload    CHF- acute [ x]   chronic [ x]    systolic [ x]   diatolic [ ]          - Echo- EF -  *5       [ ] RV dysfunction          - Cxr-cardiomegally, edema          - Clinical-  [x]inotropes   [x]pressors   [x]diuresis   [ ]IABP   [x]ECMO   [ ]LVAD   [ ]Respiratory Failure.    IVCD Insulin     IVCD anticoagulation with [x] Heparin  [ ] Argatroban     Thrombocytopenia    Lactic acidosis 9.1 -> 6.5    Hypernatremia    Renal Failure - Acute Kidney Injury     Requires bedside physical therapy, mobilization and total senior care care.           By signing my name below, I, Maria D'Amico, attest that this documentation has been prepared under the direction and in the presence of Finn Zelaya MD.   Electronically Signed: Maria D'Amico, Scribe 11-26-22 @ 07:05    I, Finn Zelaya, personally performed the services described in this documentation. All medical record entries made by the karlaibe were at my direction and in my presence. I have reviewed the chart and agree that the record reflects my personal performance and is accurate and complete.   Finn Zelaya MD.       Discussed with CT surgeon, Physician Assistant - Nurse Practitioner- Critical care medicine team.   Discussed at  AM / PM rounds.   Chart, labs , films reviewed.    Cumulative Critical Care Time Given Today:  90 min CRITICAL CARE ATTENDING - CTICU    MEDICATIONS  (STANDING):  chlorhexidine 0.12% Liquid 15 milliLiter(s) Oral Mucosa every 12 hours  chlorhexidine 2% Cloths 1 Application(s) Topical <User Schedule>  chlorhexidine 4% Liquid 1 Application(s) Topical <User Schedule>  dextrose 50% Injectable 50 milliLiter(s) IV Push every 15 minutes  EPINEPHrine    Infusion 0.07 MICROgram(s)/kG/Min (24.4 mL/Hr) IV Continuous <Continuous>  furosemide Infusion 2.5 mG/Hr (1.25 mL/Hr) IV Continuous <Continuous>  heparin  Infusion 500 Unit(s)/Hr (5 mL/Hr) IV Continuous <Continuous>  hydrocortisone sodium succinate Injectable 100 milliGRAM(s) IV Push every 8 hours  insulin lispro (ADMELOG) corrective regimen sliding scale   SubCutaneous every 6 hours  insulin regular Infusion 6 Unit(s)/Hr (6 mL/Hr) IV Continuous <Continuous>  meropenem  IVPB 1000 milliGRAM(s) IV Intermittent every 12 hours  norepinephrine Infusion 0.05 MICROgram(s)/kG/Min (8.71 mL/Hr) IV Continuous <Continuous>  pantoprazole  Injectable 40 milliGRAM(s) IV Push two times a day  propofol Infusion 10 MICROgram(s)/kG/Min (5.57 mL/Hr) IV Continuous <Continuous>  sodium chloride 0.9%. 1000 milliLiter(s) (10 mL/Hr) IV Continuous <Continuous>  vasopressin Infusion 0.1 Unit(s)/Min (15 mL/Hr) IV Continuous <Continuous>                                    9.8    18.25 )-----------( 107      ( 26 Nov 2022 01:53 )             28.8       11-26    151<H>  |  101  |  39<H>  ----------------------------<  271<H>  3.1<L>   |  25  |  2.83<H>    Ca    9.8      26 Nov 2022 01:53  Phos  2.6     11-26  Mg     2.7     11-26    TPro  5.2<L>  /  Alb  3.2<L>  /  TBili  2.4<H>  /  DBili  x   /  AST  721<H>  /  ALT  354<H>  /  AlkPhos  56  11-26      PT/INR - ( 26 Nov 2022 01:45 )   PT: 17.2 sec;   INR: 1.48 ratio         PTT - ( 26 Nov 2022 01:45 )  PTT:37.2 sec    Mode: AC/ CMV (Assist Control/ Continuous Mandatory Ventilation)  RR (machine): 14  TV (machine): 400  FiO2: 40  PEEP: 14  ITime: 1  MAP: 17  PC: 10  PIP: 24      Daily     Daily       11-25 @ 07:01  -  11-26 @ 07:00  --------------------------------------------------------  IN: 4012.8 mL / OUT: 2440 mL / NET: 1572.8 mL        Critically Ill patient  : [ ] preoperative ,   [x] post operative    Requires :  [x] Arterial Line   [x] Central Line  [ ] PA catheter  [ ] IABP  [x] ECMO - [facility]  [ ] LVAD  [x] Ventilator  [ ] pacemaker [ ] Impella.                      [x ] ABG's     [ x] Pulse Oxymetry Monitoring  Bedside evaluation , monitoring , treatment of hemodynamics , fluids , IVP/ IVCD meds.        Diagnosis:     POD 1 [facility] ECMO s/p arrest    Hypovolemia    Respiratory Failure     Ventilator Management:  [x]AC-rest    [ ]CPAP-PS Wean    [ ]Trach Collar     [ ]Extubate    [ ] T-Piece  [ ]peep>5     Respiratory status required full ventilatory support, close monitoring of respiratory rate and breathing pattern, the following of ABG’s with A-line monitoring, continuous pulse oximetry monitoring     Requires chest PT, pulmonary toilet, ambu bagging, suctioning to maintain SaO2,  patent airway and treat atelectasis.     ? ARDS ?     pancreatitis - gall stone     Cardiogenic Shock - EF ? 8   ?    ECMO circuit    Hemodynamic lability,  instability. Requires IVCD [x] vasopressors [x] inotropes  [ ] vasodilator  [x]IVSS fluid  to maintain MAP, perfusion, C.I.      Fluid overload    CHF- acute [ x]   chronic [ x]    systolic [ x]   diatolic [ ]          - Echo- EF -  *5       [ ] RV dysfunction          - Cxr-cardiomegally, edema          - Clinical-  [x]inotropes   [x]pressors   [x]diuresis   [ ]IABP   [x]ECMO   [ ]LVAD   [ ]Respiratory Failure.    IVCD Insulin     IVCD anticoagulation with [x] Heparin  [ ] Argatroban     Thrombocytopenia    Lactic acidosis 9.1 -> 6.5    Hypernatremia    Renal Failure - Acute Kidney Injury     Requires bedside physical therapy, mobilization and total shelter care.           By signing my name below, I, Maria D'Amico, attest that this documentation has been prepared under the direction and in the presence of Finn Zelaya MD.   Electronically Signed: Maria D'Amico, Scribe 11-26-22 @ 07:05    I, Finn Zelaya, personally performed the services described in this documentation. All medical record entries made by the karlaibe were at my direction and in my presence. I have reviewed the chart and agree that the record reflects my personal performance and is accurate and complete.   Finn Zelaya MD.       Discussed with CT surgeon, Physician Assistant - Nurse Practitioner- Critical care medicine team.   Discussed at  AM / PM rounds.   Chart, labs , films reviewed.    Cumulative Critical Care Time Given Today:  90 min

## 2022-11-26 NOTE — PROGRESS NOTE ADULT - ASSESSMENT
61 y/o male with PMH of CABG, DM, HTN, HLD presents with abdominal pain, diarrhea and n/v for 1 day. Found to have acute cholecystitis, gallstone pancreatitis. Now with resp failure intubated on ECMO    Plan:    - f/u cardiology recs: tele, serial troponin, bedside ECHO today  - F/U GI consult: dwayne MRCP for possible perc choly/drainage)  - surgical internvention of gallbladder when stable from cardiac/medical standpoint  - intubated, on pressors  - ECMO  - hep gtt  - NPO/IVF  - IV abx  - Pain control PRN  - rest of care as per CCU    63 y/o male with PMH of CABG, DM, HTN, HLD presents with abdominal pain, diarrhea and n/v for 1 day. Found to have acute cholecystitis, gallstone pancreatitis. Now with resp failure intubated on ECMO    Plan:    - f/u cardiology recs: tele, serial troponin, bedside ECHO today  - F/U GI consult: dwayne MRCP for possible perc choly/drainage)  - surgical internvention of gallbladder when stable from cardiac/medical standpoint  - intubated, on pressors  - ECMO  - hep gtt  - NPO/IVF  - IV abx  - Pain control PRN  - rest of care as per CCU

## 2022-11-27 ENCOUNTER — TRANSCRIPTION ENCOUNTER (OUTPATIENT)
Age: 62
End: 2022-11-27

## 2022-11-27 LAB
ALBUMIN SERPL ELPH-MCNC: 3.1 G/DL — LOW (ref 3.3–5)
ALP SERPL-CCNC: 62 U/L — SIGNIFICANT CHANGE UP (ref 40–120)
ALT FLD-CCNC: 223 U/L — HIGH (ref 10–45)
AMYLASE P1 CFR SERPL: 300 U/L — HIGH (ref 25–125)
ANION GAP SERPL CALC-SCNC: 14 MMOL/L — SIGNIFICANT CHANGE UP (ref 5–17)
APTT BLD: 33.7 SEC — SIGNIFICANT CHANGE UP (ref 27.5–35.5)
APTT BLD: 36.1 SEC — HIGH (ref 27.5–35.5)
APTT BLD: 37.1 SEC — HIGH (ref 27.5–35.5)
APTT BLD: 38.8 SEC — HIGH (ref 27.5–35.5)
AST SERPL-CCNC: 556 U/L — HIGH (ref 10–40)
BASE EXCESS BLDV CALC-SCNC: 6.9 MMOL/L — HIGH (ref -2–3)
BASE EXCESS BLDV CALC-SCNC: 7.2 MMOL/L — HIGH (ref -2–3)
BILIRUB SERPL-MCNC: 1.1 MG/DL — SIGNIFICANT CHANGE UP (ref 0.2–1.2)
BLD GP AB SCN SERPL QL: NEGATIVE — SIGNIFICANT CHANGE UP
BUN SERPL-MCNC: 47 MG/DL — HIGH (ref 7–23)
CA-I SERPL-SCNC: 1.15 MMOL/L — SIGNIFICANT CHANGE UP (ref 1.15–1.33)
CALCIUM SERPL-MCNC: 9.1 MG/DL — SIGNIFICANT CHANGE UP (ref 8.4–10.5)
CHLORIDE BLDV-SCNC: 112 MMOL/L — HIGH (ref 96–108)
CHLORIDE SERPL-SCNC: 108 MMOL/L — SIGNIFICANT CHANGE UP (ref 96–108)
CK MB BLD-MCNC: 2.6 % — SIGNIFICANT CHANGE UP (ref 0–3.5)
CK MB CFR SERPL CALC: 43.3 NG/ML — HIGH (ref 0–6.7)
CK SERPL-CCNC: 1659 U/L — HIGH (ref 30–200)
CO2 BLDV-SCNC: 33 MMOL/L — HIGH (ref 22–26)
CO2 BLDV-SCNC: 35 MMOL/L — HIGH (ref 22–26)
CO2 SERPL-SCNC: 30 MMOL/L — SIGNIFICANT CHANGE UP (ref 22–31)
CREAT SERPL-MCNC: 3.81 MG/DL — HIGH (ref 0.5–1.3)
EGFR: 17 ML/MIN/1.73M2 — LOW
FIBRINOGEN PPP-MCNC: 543 MG/DL — HIGH (ref 200–445)
GAS PNL BLDA: SIGNIFICANT CHANGE UP
GAS PNL BLDV: 147 MMOL/L — HIGH (ref 136–145)
GAS PNL BLDV: SIGNIFICANT CHANGE UP
GLUCOSE BLDC GLUCOMTR-MCNC: 100 MG/DL — HIGH (ref 70–99)
GLUCOSE BLDC GLUCOMTR-MCNC: 111 MG/DL — HIGH (ref 70–99)
GLUCOSE BLDC GLUCOMTR-MCNC: 118 MG/DL — HIGH (ref 70–99)
GLUCOSE BLDC GLUCOMTR-MCNC: 120 MG/DL — HIGH (ref 70–99)
GLUCOSE BLDC GLUCOMTR-MCNC: 121 MG/DL — HIGH (ref 70–99)
GLUCOSE BLDC GLUCOMTR-MCNC: 123 MG/DL — HIGH (ref 70–99)
GLUCOSE BLDC GLUCOMTR-MCNC: 129 MG/DL — HIGH (ref 70–99)
GLUCOSE BLDC GLUCOMTR-MCNC: 133 MG/DL — HIGH (ref 70–99)
GLUCOSE BLDC GLUCOMTR-MCNC: 134 MG/DL — HIGH (ref 70–99)
GLUCOSE BLDC GLUCOMTR-MCNC: 138 MG/DL — HIGH (ref 70–99)
GLUCOSE BLDC GLUCOMTR-MCNC: 143 MG/DL — HIGH (ref 70–99)
GLUCOSE BLDC GLUCOMTR-MCNC: 144 MG/DL — HIGH (ref 70–99)
GLUCOSE BLDC GLUCOMTR-MCNC: 152 MG/DL — HIGH (ref 70–99)
GLUCOSE BLDC GLUCOMTR-MCNC: 153 MG/DL — HIGH (ref 70–99)
GLUCOSE BLDV-MCNC: 126 MG/DL — HIGH (ref 70–99)
GLUCOSE SERPL-MCNC: 127 MG/DL — HIGH (ref 70–99)
GRAM STN FLD: SIGNIFICANT CHANGE UP
HAPTOGLOB SERPL-MCNC: 77 MG/DL — SIGNIFICANT CHANGE UP (ref 34–200)
HCO3 BLDV-SCNC: 32 MMOL/L — HIGH (ref 22–29)
HCO3 BLDV-SCNC: 33 MMOL/L — HIGH (ref 22–29)
HCT VFR BLD CALC: 27.9 % — LOW (ref 39–50)
HCT VFR BLDA CALC: 26 % — LOW (ref 39–51)
HGB BLD CALC-MCNC: 8.8 G/DL — LOW (ref 12.6–17.4)
HGB BLD-MCNC: 9.2 G/DL — LOW (ref 13–17)
HOROWITZ INDEX BLDV+IHG-RTO: 100 — SIGNIFICANT CHANGE UP
INR BLD: 1.12 RATIO — SIGNIFICANT CHANGE UP (ref 0.88–1.16)
LACTATE BLDV-MCNC: 1.4 MMOL/L — SIGNIFICANT CHANGE UP (ref 0.5–2)
LDH SERPL L TO P-CCNC: 1305 U/L — HIGH (ref 50–242)
LIDOCAIN IGE QN: 95 U/L — HIGH (ref 7–60)
MAGNESIUM SERPL-MCNC: 2.4 MG/DL — SIGNIFICANT CHANGE UP (ref 1.6–2.6)
MCHC RBC-ENTMCNC: 29.1 PG — SIGNIFICANT CHANGE UP (ref 27–34)
MCHC RBC-ENTMCNC: 33 GM/DL — SIGNIFICANT CHANGE UP (ref 32–36)
MCV RBC AUTO: 88.3 FL — SIGNIFICANT CHANGE UP (ref 80–100)
NRBC # BLD: 0 /100 WBCS — SIGNIFICANT CHANGE UP (ref 0–0)
PCO2 BLDV: 46 MMHG — SIGNIFICANT CHANGE UP (ref 42–55)
PCO2 BLDV: 47 MMHG — SIGNIFICANT CHANGE UP (ref 42–55)
PCO2 BLDV: 51 MMHG — SIGNIFICANT CHANGE UP (ref 42–55)
PH BLDV: 7.42 — SIGNIFICANT CHANGE UP (ref 7.32–7.43)
PH BLDV: 7.44 — HIGH (ref 7.32–7.43)
PH BLDV: 7.45 — HIGH (ref 7.32–7.43)
PHOSPHATE SERPL-MCNC: 4.8 MG/DL — HIGH (ref 2.5–4.5)
PLATELET # BLD AUTO: 118 K/UL — LOW (ref 150–400)
PO2 BLDV: 51 MMHG — HIGH (ref 25–45)
PO2 BLDV: 52 MMHG — HIGH (ref 25–45)
PO2 BLDV: 55 MMHG — HIGH (ref 25–45)
POTASSIUM BLDV-SCNC: 3.6 MMOL/L — SIGNIFICANT CHANGE UP (ref 3.5–5.1)
POTASSIUM SERPL-MCNC: 4.1 MMOL/L — SIGNIFICANT CHANGE UP (ref 3.5–5.3)
POTASSIUM SERPL-SCNC: 4.1 MMOL/L — SIGNIFICANT CHANGE UP (ref 3.5–5.3)
PROT SERPL-MCNC: 5.8 G/DL — LOW (ref 6–8.3)
PROTHROM AB SERPL-ACNC: 12.9 SEC — SIGNIFICANT CHANGE UP (ref 10.5–13.4)
RBC # BLD: 3.16 M/UL — LOW (ref 4.2–5.8)
RBC # FLD: 15.4 % — HIGH (ref 10.3–14.5)
RH IG SCN BLD-IMP: POSITIVE — SIGNIFICANT CHANGE UP
SAO2 % BLDV: 84 % — SIGNIFICANT CHANGE UP (ref 67–88)
SAO2 % BLDV: 85.1 % — SIGNIFICANT CHANGE UP (ref 67–88)
SAO2 % BLDV: 85.7 % — SIGNIFICANT CHANGE UP (ref 67–88)
SODIUM SERPL-SCNC: 152 MMOL/L — HIGH (ref 135–145)
SPECIMEN SOURCE: SIGNIFICANT CHANGE UP
TRIGL SERPL-MCNC: 337 MG/DL — HIGH
TROPONIN T, HIGH SENSITIVITY RESULT: HIGH NG/L (ref 0–51)
VANCOMYCIN TROUGH SERPL-MCNC: 10.8 UG/ML — SIGNIFICANT CHANGE UP (ref 10–20)
WBC # BLD: 15.27 K/UL — HIGH (ref 3.8–10.5)
WBC # FLD AUTO: 15.27 K/UL — HIGH (ref 3.8–10.5)

## 2022-11-27 PROCEDURE — 71045 X-RAY EXAM CHEST 1 VIEW: CPT | Mod: 26,77

## 2022-11-27 PROCEDURE — 99292 CRITICAL CARE ADDL 30 MIN: CPT

## 2022-11-27 PROCEDURE — 71045 X-RAY EXAM CHEST 1 VIEW: CPT | Mod: 26

## 2022-11-27 PROCEDURE — 99291 CRITICAL CARE FIRST HOUR: CPT

## 2022-11-27 RX ORDER — HYDROMORPHONE HYDROCHLORIDE 2 MG/ML
0.5 INJECTION INTRAMUSCULAR; INTRAVENOUS; SUBCUTANEOUS ONCE
Refills: 0 | Status: DISCONTINUED | OUTPATIENT
Start: 2022-11-27 | End: 2022-11-27

## 2022-11-27 RX ORDER — POTASSIUM CHLORIDE 20 MEQ
10 PACKET (EA) ORAL ONCE
Refills: 0 | Status: COMPLETED | OUTPATIENT
Start: 2022-11-27 | End: 2022-11-27

## 2022-11-27 RX ADMIN — CHLORHEXIDINE GLUCONATE 15 MILLILITER(S): 213 SOLUTION TOPICAL at 17:48

## 2022-11-27 RX ADMIN — CHLORHEXIDINE GLUCONATE 1 APPLICATION(S): 213 SOLUTION TOPICAL at 05:03

## 2022-11-27 RX ADMIN — Medication 1 DROP(S): at 13:19

## 2022-11-27 RX ADMIN — Medication 1 DROP(S): at 23:09

## 2022-11-27 RX ADMIN — HEPARIN SODIUM 6 UNIT(S)/HR: 5000 INJECTION INTRAVENOUS; SUBCUTANEOUS at 00:25

## 2022-11-27 RX ADMIN — Medication 1 DROP(S): at 18:07

## 2022-11-27 RX ADMIN — Medication 1 DROP(S): at 05:15

## 2022-11-27 RX ADMIN — HYDROMORPHONE HYDROCHLORIDE 0.5 MILLIGRAM(S): 2 INJECTION INTRAMUSCULAR; INTRAVENOUS; SUBCUTANEOUS at 01:15

## 2022-11-27 RX ADMIN — MEROPENEM 100 MILLIGRAM(S): 1 INJECTION INTRAVENOUS at 05:15

## 2022-11-27 RX ADMIN — Medication 50 MILLIGRAM(S): at 14:19

## 2022-11-27 RX ADMIN — HEPARIN SODIUM 6.25 UNIT(S)/HR: 5000 INJECTION INTRAVENOUS; SUBCUTANEOUS at 21:18

## 2022-11-27 RX ADMIN — Medication 50 MILLIGRAM(S): at 21:17

## 2022-11-27 RX ADMIN — HYDROMORPHONE HYDROCHLORIDE 0.5 MILLIGRAM(S): 2 INJECTION INTRAMUSCULAR; INTRAVENOUS; SUBCUTANEOUS at 01:00

## 2022-11-27 RX ADMIN — INSULIN HUMAN 6 UNIT(S)/HR: 100 INJECTION, SOLUTION SUBCUTANEOUS at 21:18

## 2022-11-27 RX ADMIN — PANTOPRAZOLE SODIUM 40 MILLIGRAM(S): 20 TABLET, DELAYED RELEASE ORAL at 17:48

## 2022-11-27 RX ADMIN — CHLORHEXIDINE GLUCONATE 15 MILLILITER(S): 213 SOLUTION TOPICAL at 05:03

## 2022-11-27 RX ADMIN — DEXMEDETOMIDINE HYDROCHLORIDE IN 0.9% SODIUM CHLORIDE 34.8 MICROGRAM(S)/KG/HR: 4 INJECTION INTRAVENOUS at 21:17

## 2022-11-27 RX ADMIN — PROPOFOL 5.57 MICROGRAM(S)/KG/MIN: 10 INJECTION, EMULSION INTRAVENOUS at 00:24

## 2022-11-27 RX ADMIN — DEXMEDETOMIDINE HYDROCHLORIDE IN 0.9% SODIUM CHLORIDE 34.8 MICROGRAM(S)/KG/HR: 4 INJECTION INTRAVENOUS at 00:24

## 2022-11-27 RX ADMIN — PANTOPRAZOLE SODIUM 40 MILLIGRAM(S): 20 TABLET, DELAYED RELEASE ORAL at 05:15

## 2022-11-27 RX ADMIN — Medication 1 DROP(S): at 00:24

## 2022-11-27 RX ADMIN — MEROPENEM 100 MILLIGRAM(S): 1 INJECTION INTRAVENOUS at 17:48

## 2022-11-27 RX ADMIN — Medication 50 MILLIEQUIVALENT(S): at 05:02

## 2022-11-27 RX ADMIN — INSULIN HUMAN 6 UNIT(S)/HR: 100 INJECTION, SOLUTION SUBCUTANEOUS at 00:24

## 2022-11-27 RX ADMIN — Medication 50 MILLIGRAM(S): at 06:15

## 2022-11-27 NOTE — DIETITIAN INITIAL EVALUATION ADULT - ETIOLOGY
increased physiological demand for nutrients inability to take PO 2/2 intubation + altered GI function

## 2022-11-27 NOTE — DIETITIAN INITIAL EVALUATION ADULT - PERTINENT MEDS FT
MEDICATIONS  (STANDING):  artificial  tears Solution 1 Drop(s) Both EYES four times a day  chlorhexidine 0.12% Liquid 15 milliLiter(s) Oral Mucosa every 12 hours  chlorhexidine 2% Cloths 1 Application(s) Topical <User Schedule>  chlorhexidine 4% Liquid 1 Application(s) Topical <User Schedule>  dexMEDEtomidine Infusion 1.5 MICROgram(s)/kG/Hr (34.8 mL/Hr) IV Continuous <Continuous>  dextrose 50% Injectable 50 milliLiter(s) IV Push every 15 minutes  heparin  Infusion 500 Unit(s)/Hr (6.25 mL/Hr) IV Continuous <Continuous>  hydrocortisone sodium succinate Injectable 50 milliGRAM(s) IV Push every 8 hours  insulin lispro (ADMELOG) corrective regimen sliding scale   SubCutaneous every 6 hours  insulin regular Infusion 6 Unit(s)/Hr (6 mL/Hr) IV Continuous <Continuous>  meropenem  IVPB 1000 milliGRAM(s) IV Intermittent every 12 hours  pantoprazole  Injectable 40 milliGRAM(s) IV Push two times a day  propofol Infusion 10 MICROgram(s)/kG/Min (5.57 mL/Hr) IV Continuous <Continuous>  sodium chloride 0.9%. 1000 milliLiter(s) (10 mL/Hr) IV Continuous <Continuous>  vasopressin Infusion 0.1 Unit(s)/Min (15 mL/Hr) IV Continuous <Continuous>    MEDICATIONS  (PRN):  sodium chloride 0.9% lock flush 10 milliLiter(s) IV Push every 1 hour PRN Pre/post blood products, medications, blood draw, and to maintain line patency

## 2022-11-27 NOTE — DIETITIAN INITIAL EVALUATION ADULT - NAME AND PHONE
Mala Han MS, RD, CDN, [facility] #[phone]  Mala Han MS, RD, CDN, [facility] #[phone]  Mala Han MS, RD, CDN, [facility] #[phone]

## 2022-11-27 NOTE — DIETITIAN INITIAL EVALUATION ADULT - NSFNSGIIOFT_GEN_A_CORE
Last  11/26 per flow sheets. Pt with OGT to low intermittent suction.    11-26-22 @ 07:01  -  11-27-22 @ 07:00  --------------------------------------------------------  OUT:    Nasogastric/Oral tube (mL): 50 mL  Total OUT: 50 mL    Total NET: -50 mL

## 2022-11-27 NOTE — DIETITIAN INITIAL EVALUATION ADULT - ENTERAL
Per team, plan to keep pt NPO at this time. If EN warranted, recommend initiate trickle feeds of Vital AF at 10mL/hr x 24hr. If able, advance as tolerated to goal rate of 70mL/hr x 24hr to provide 1680mL, 2016kcal, 126g protein, 1362mL free water. Regimen meets 21.7kcal/kg based on dosing wt 92.8kg, 1.8g/kg protein based on IBW 69.8kg. Free water flushes to team.

## 2022-11-27 NOTE — DIETITIAN INITIAL EVALUATION ADULT - REASON FOR ADMISSION
Patient is a 61 y/o male with PMH of CAD s/p CABG, DM, HTN, HLD presenting as transfer from [place] for c/f STEMI. Pt initially presented to [facility] w/ epigastric pain, also found to have lipase 30K, elevated WBC. Since transfer, course c/b cardiac arrest s/p ROSC, now on ECMO. +SDH.     Patient is a 63 y/o male with PMH of CAD s/p CABG, DM, HTN, HLD presenting as transfer from [place] for c/f STEMI. Pt initially presented to [facility] w/ epigastric pain, also found to have lipase 30K, elevated WBC. Since transfer, course c/b cardiac arrest s/p ROSC, now on ECMO. +SDH.     Patient is a 61 y/o male with PMH of CAD s/p CABG, DM, HTN, HLD presenting as transfer from [place] for c/f STEMI. Pt initially presented to [facility] w/ epigastric pain, also found to have lipase 30K, elevated WBC. Since transfer, course c/b cardiac arrest s/p ROSC, now on ECMO. +SDH.

## 2022-11-27 NOTE — DIETITIAN INITIAL EVALUATION ADULT - ORAL INTAKE PTA/DIET HISTORY
Unable to obtain subjective information from pt at this time; pt currently intubated/sedated. Per chart, NKFA, no micronutrient supplementation PTA. Unknown chewing/swallowing function PTA at this time.

## 2022-11-27 NOTE — DIETITIAN INITIAL EVALUATION ADULT - PERTINENT LABORATORY DATA
11-27    152<H>  |  108  |  47<H>  ----------------------------<  127<H>  4.1   |  30  |  3.81<H>    Ca    9.1      27 Nov 2022 00:33  Phos  4.8     11-27  Mg     2.4     11-27    TPro  5.8<L>  /  Alb  3.1<L>  /  TBili  1.1  /  DBili  x   /  AST  556<H>  /  ALT  223<H>  /  AlkPhos  62  11-27  POCT Blood Glucose.: 153 mg/dL (11-27-22 @ 10:48)  A1C with Estimated Average Glucose Result: 7.3 % (11-25-22 @ 03:58)

## 2022-11-27 NOTE — PROGRESS NOTE ADULT - SUBJECTIVE AND OBJECTIVE BOX
CRITICAL CARE ATTENDING - CTICU    MEDICATIONS  (STANDING):  artificial  tears Solution 1 Drop(s) Both EYES four times a day  chlorhexidine 0.12% Liquid 15 milliLiter(s) Oral Mucosa every 12 hours  chlorhexidine 2% Cloths 1 Application(s) Topical <User Schedule>  chlorhexidine 4% Liquid 1 Application(s) Topical <User Schedule>  dexMEDEtomidine Infusion 1.5 MICROgram(s)/kG/Hr (34.8 mL/Hr) IV Continuous <Continuous>  dextrose 50% Injectable 50 milliLiter(s) IV Push every 15 minutes  heparin  Infusion 500 Unit(s)/Hr (6.25 mL/Hr) IV Continuous <Continuous>  hydrocortisone sodium succinate Injectable 50 milliGRAM(s) IV Push every 8 hours  insulin lispro (ADMELOG) corrective regimen sliding scale   SubCutaneous every 6 hours  insulin regular Infusion 6 Unit(s)/Hr (6 mL/Hr) IV Continuous <Continuous>  meropenem  IVPB 1000 milliGRAM(s) IV Intermittent every 12 hours  pantoprazole  Injectable 40 milliGRAM(s) IV Push two times a day  propofol Infusion 10 MICROgram(s)/kG/Min (5.57 mL/Hr) IV Continuous <Continuous>  sodium chloride 0.9%. 1000 milliLiter(s) (10 mL/Hr) IV Continuous <Continuous>  vasopressin Infusion 0.1 Unit(s)/Min (15 mL/Hr) IV Continuous <Continuous>                                    9.2    15.27 )-----------( 118      ( 2022 04:01 )             27.9       11-    152<H>  |  108  |  47<H>  ----------------------------<  127<H>  4.1   |  30  |  3.81<H>    Ca    9.1      2022 00:33  Phos  4.8       Mg     2.4         TPro  5.8<L>  /  Alb  3.1<L>  /  TBili  1.1  /  DBili  x   /  AST  556<H>  /  ALT  223<H>  /  AlkPhos  62        PT/INR - ( 2022 00:33 )   PT: 12.9 sec;   INR: 1.12 ratio         PTT - ( 2022 06:38 )  PTT:37.1 sec    Mode: AC/ CMV (Assist Control/ Continuous Mandatory Ventilation)  RR (machine): 14  FiO2: 40  PEEP: 14  ITime: 1.4  MAP: 18  PC: 10  PIP: 25      Daily     Daily Weight in k.6 (2022 00:00)       @ 07:01  -   @ 07:00  --------------------------------------------------------  IN: 2113.3 mL / OUT: 6710 mL / NET: -4596.7 mL        Critically Ill patient  : [ ] preoperative ,   [x] post operative    Requires :  [x] Arterial Line   [x] Central Line  [ ] PA catheter  [ ] IABP  [x] ECMO - [facility]  [ ] LVAD  [x] Ventilator  [ ] pacemaker [ ] Impella.                      [x ] ABG's     [ x] Pulse Oxymetry Monitoring  Bedside evaluation , monitoring , treatment of hemodynamics , fluids , IVP/ IVCD meds.        Diagnosis:     POD 2 [facility] ECMO s/p arrest    Hypovolemia    Respiratory Failure     Ventilator Management:  [x]AC-rest    [ ]CPAP-PS Wean    [ ]Trach Collar     [ ]Extubate    [ ] T-Piece  [x]peep>5     Respiratory status required full ventilatory support, close monitoring of respiratory rate and breathing pattern, the following of ABG’s with A-line monitoring, continuous pulse oximetry monitoring     Requires chest PT, pulmonary toilet, ambu bagging, suctioning to maintain SaO2,  patent airway and treat atelectasis.     IVCD Propofol and Precedex for vent synchrony    Hemodynamic lability,  instability. Requires IVCD [x] vasopressors- currently off [] inotropes  [ ] vasodilator  [x]IVSS fluid  to maintain MAP, perfusion, C.I.      CHF- acute [ x]   chronic [ x]    systolic [ x]   diatolic [ ]          - Echo- EF -    8%      [ ] RV dysfunction          - Cxr-cardiomegally, edema          - Clinical-  [ ]inotropes   [x] pressors- currently off   [ ]diuresis   [ ]IABP   [x]ECMO   [ ]LVAD   [ ]Respiratory Failure.     IVCD anticoagulation with [x] Heparin  [ ] Argatroban     [facility] ECMO circuit    Renal Failure - Acute Kidney Injury     Hypernatremia    Ophelia 20 ppm    ? ARDS ?    pancreatitis - gall stone     Cardiogenic Shock - EF ? 8   ?    Thrombocytopenia    DM- IVCD Insulin    Requires bedside physical therapy, mobilization and total [facility] care.         By signing my name below, I, Maria D'Amico, attest that this documentation has been prepared under the direction and in the presence of Finn Zelaya MD.   Electronically Signed: Maria D'Amico, Scribe - @ 07:09      Discussed with CT surgeon, Physician Assistant - Nurse Practitioner- Critical care medicine team.   Discussed at  AM / PM rounds.   Chart, labs , films reviewed.    Cumulative Critical Care Time Given Today:  CRITICAL CARE ATTENDING - [facility]    MEDICATIONS  (STANDING):  artificial  tears Solution 1 Drop(s) Both EYES four times a day  chlorhexidine 0.12% Liquid 15 milliLiter(s) Oral Mucosa every 12 hours  chlorhexidine 2% Cloths 1 Application(s) Topical <User Schedule>  chlorhexidine 4% Liquid 1 Application(s) Topical <User Schedule>  dexMEDEtomidine Infusion 1.5 MICROgram(s)/kG/Hr (34.8 mL/Hr) IV Continuous <Continuous>  dextrose 50% Injectable 50 milliLiter(s) IV Push every 15 minutes  heparin  Infusion 500 Unit(s)/Hr (6.25 mL/Hr) IV Continuous <Continuous>  hydrocortisone sodium succinate Injectable 50 milliGRAM(s) IV Push every 8 hours  insulin lispro (ADMELOG) corrective regimen sliding scale   SubCutaneous every 6 hours  insulin regular Infusion 6 Unit(s)/Hr (6 mL/Hr) IV Continuous <Continuous>  meropenem  IVPB 1000 milliGRAM(s) IV Intermittent every 12 hours  pantoprazole  Injectable 40 milliGRAM(s) IV Push two times a day  propofol Infusion 10 MICROgram(s)/kG/Min (5.57 mL/Hr) IV Continuous <Continuous>  sodium chloride 0.9%. 1000 milliLiter(s) (10 mL/Hr) IV Continuous <Continuous>  vasopressin Infusion 0.1 Unit(s)/Min (15 mL/Hr) IV Continuous <Continuous>                                    9.2    15.27 )-----------( 118      ( 2022 04:01 )             27.9       11-    152<H>  |  108  |  47<H>  ----------------------------<  127<H>  4.1   |  30  |  3.81<H>    Ca    9.1      2022 00:33  Phos  4.8       Mg     2.4         TPro  5.8<L>  /  Alb  3.1<L>  /  TBili  1.1  /  DBili  x   /  AST  556<H>  /  ALT  223<H>  /  AlkPhos  62        PT/INR - ( 2022 00:33 )   PT: 12.9 sec;   INR: 1.12 ratio         PTT - ( 2022 06:38 )  PTT:37.1 sec    Mode: AC/ CMV (Assist Control/ Continuous Mandatory Ventilation)  RR (machine): 14  FiO2: 40  PEEP: 14  ITime: 1.4  MAP: 18  PC: 10  PIP: 25      Daily     Daily Weight in k.6 (2022 00:00)       @ 07:01  -   @ 07:00  --------------------------------------------------------  IN: 2113.3 mL / OUT: 6710 mL / NET: -4596.7 mL        Critically Ill patient  : [ ] preoperative ,   [x] post operative    Requires :  [x] Arterial Line   [x] Central Line  [ ] PA catheter  [ ] IABP  [x] ECMO - [facility]  [ ] LVAD  [x] Ventilator  [ ] pacemaker [ ] Impella.                      [x ] ABG's     [ x] Pulse Oxymetry Monitoring  Bedside evaluation , monitoring , treatment of hemodynamics , fluids , IVP/ IVCD meds.        Diagnosis:     POD 2 [facility] ECMO s/p arrest    Hypovolemia    Respiratory Failure     Ventilator Management:  [x]AC-rest    [ ]CPAP-PS Wean    [ ]Trach Collar     [ ]Extubate    [ ] T-Piece  [x]peep>5     Respiratory status required full ventilatory support, close monitoring of respiratory rate and breathing pattern, the following of ABG’s with A-line monitoring, continuous pulse oximetry monitoring     Requires chest PT, pulmonary toilet, ambu bagging, suctioning to maintain SaO2,  patent airway and treat atelectasis.     IVCD Propofol and Precedex for vent synchrony    Hemodynamic lability,  instability. Requires IVCD [x] vasopressors- currently off [] inotropes  [ ] vasodilator  [x]IVSS fluid  to maintain MAP, perfusion, C.I.      CHF- acute [ x]   chronic [ x]    systolic [ x]   diatolic [ ]          - Echo- EF -    8%      [ ] RV dysfunction          - Cxr-cardiomegally, edema          - Clinical-  [ ]inotropes   [x] pressors- currently off   [ ]diuresis   [ ]IABP   [x]ECMO   [ ]LVAD   [ ]Respiratory Failure.     IVCD anticoagulation with [x] Heparin  [ ] Argatroban     [facility] ECMO circuit    Renal Failure - Acute Kidney Injury     Hypernatremia    Ophelia 20 ppm    ? ARDS ?    pancreatitis - gall stone     Cardiogenic Shock - EF ? 8   ?    Thrombocytopenia    DM- IVCD Insulin    Requires bedside physical therapy, mobilization and total senior care care.         By signing my name below, I, Maria D'Amico, attest that this documentation has been prepared under the direction and in the presence of Finn Zelaya MD.   Electronically Signed: Maria D'Amico, Scribe - @ 07:09      Discussed with CT surgeon, Physician Assistant - Nurse Practitioner- Critical care medicine team.   Discussed at  AM / PM rounds.   Chart, labs , films reviewed.    Cumulative Critical Care Time Given Today:  CRITICAL CARE ATTENDING - [facility]    MEDICATIONS  (STANDING):  artificial  tears Solution 1 Drop(s) Both EYES four times a day  chlorhexidine 0.12% Liquid 15 milliLiter(s) Oral Mucosa every 12 hours  chlorhexidine 2% Cloths 1 Application(s) Topical <User Schedule>  chlorhexidine 4% Liquid 1 Application(s) Topical <User Schedule>  dexMEDEtomidine Infusion 1.5 MICROgram(s)/kG/Hr (34.8 mL/Hr) IV Continuous <Continuous>  dextrose 50% Injectable 50 milliLiter(s) IV Push every 15 minutes  heparin  Infusion 500 Unit(s)/Hr (6.25 mL/Hr) IV Continuous <Continuous>  hydrocortisone sodium succinate Injectable 50 milliGRAM(s) IV Push every 8 hours  insulin lispro (ADMELOG) corrective regimen sliding scale   SubCutaneous every 6 hours  insulin regular Infusion 6 Unit(s)/Hr (6 mL/Hr) IV Continuous <Continuous>  meropenem  IVPB 1000 milliGRAM(s) IV Intermittent every 12 hours  pantoprazole  Injectable 40 milliGRAM(s) IV Push two times a day  propofol Infusion 10 MICROgram(s)/kG/Min (5.57 mL/Hr) IV Continuous <Continuous>  sodium chloride 0.9%. 1000 milliLiter(s) (10 mL/Hr) IV Continuous <Continuous>  vasopressin Infusion 0.1 Unit(s)/Min (15 mL/Hr) IV Continuous <Continuous>                                    9.2    15.27 )-----------( 118      ( 2022 04:01 )             27.9       11-    152<H>  |  108  |  47<H>  ----------------------------<  127<H>  4.1   |  30  |  3.81<H>    Ca    9.1      2022 00:33  Phos  4.8       Mg     2.4         TPro  5.8<L>  /  Alb  3.1<L>  /  TBili  1.1  /  DBili  x   /  AST  556<H>  /  ALT  223<H>  /  AlkPhos  62        PT/INR - ( 2022 00:33 )   PT: 12.9 sec;   INR: 1.12 ratio         PTT - ( 2022 06:38 )  PTT:37.1 sec    Mode: AC/ CMV (Assist Control/ Continuous Mandatory Ventilation)  RR (machine): 14  FiO2: 40  PEEP: 14  ITime: 1.4  MAP: 18  PC: 10  PIP: 25      Daily     Daily Weight in k.6 (2022 00:00)       @ 07:01  -   @ 07:00  --------------------------------------------------------  IN: 2113.3 mL / OUT: 6710 mL / NET: -4596.7 mL        Critically Ill patient  : [ ] preoperative ,   [x] post operative    Requires :  [x] Arterial Line   [x] Central Line  [ ] PA catheter  [ ] IABP  [x] ECMO - [facility]  [ ] LVAD  [x] Ventilator  [ ] pacemaker [ ] Impella.                      [x ] ABG's     [ x] Pulse Oxymetry Monitoring  Bedside evaluation , monitoring , treatment of hemodynamics , fluids , IVP/ IVCD meds.        Diagnosis:     POD 2 [facility] ECMO s/p arrest    Hypovolemia    Respiratory Failure     Ventilator Management:  [x]AC-rest    [ ]CPAP-PS Wean    [ ]Trach Collar     [ ]Extubate    [ ] T-Piece  [x]peep>5     Respiratory status required full ventilatory support, close monitoring of respiratory rate and breathing pattern, the following of ABG’s with A-line monitoring, continuous pulse oximetry monitoring     Requires chest PT, pulmonary toilet, ambu bagging, suctioning to maintain SaO2,  patent airway and treat atelectasis.     IVCD Propofol and Precedex for vent synchrony    Hemodynamic lability,  instability. Requires IVCD [x] vasopressors- currently off [] inotropes  [ ] vasodilator  [x]IVSS fluid  to maintain MAP, perfusion, C.I.      CHF- acute [ x]   chronic [ x]    systolic [ x]   diatolic [ ]          - Echo- EF -    8%      [ ] RV dysfunction          - Cxr-cardiomegally, edema          - Clinical-  [ ]inotropes   [x] pressors- currently off   [ ]diuresis   [ ]IABP   [x]ECMO   [ ]LVAD   [ ]Respiratory Failure.     IVCD anticoagulation with [x] Heparin  [ ] Argatroban     VA ECMO circuit    Renal Failure - Acute Kidney Injury     Hypernatremia    Ophelia 20 ppm    ? ARDS ?    pancreatitis - gall stone     Cardiogenic Shock - EF ? 8   ?    Thrombocytopenia    DM- IVCD Insulin    Requires bedside physical therapy, mobilization and total [facility] care.         By signing my name below, I, Maria D'Amico, attest that this documentation has been prepared under the direction and in the presence of Finn Zelaya MD.   Electronically Signed: Maria D'Amico, Scribe - @ 07:09      Discussed with CT surgeon, Physician Assistant - Nurse Practitioner- Critical care medicine team.   Discussed at  AM / PM rounds.   Chart, labs , films reviewed.    Cumulative Critical Care Time Given Today:  CRITICAL CARE ATTENDING - [facility]    MEDICATIONS  (STANDING):  artificial  tears Solution 1 Drop(s) Both EYES four times a day  chlorhexidine 0.12% Liquid 15 milliLiter(s) Oral Mucosa every 12 hours  chlorhexidine 2% Cloths 1 Application(s) Topical <User Schedule>  chlorhexidine 4% Liquid 1 Application(s) Topical <User Schedule>  dexMEDEtomidine Infusion 1.5 MICROgram(s)/kG/Hr (34.8 mL/Hr) IV Continuous <Continuous>  dextrose 50% Injectable 50 milliLiter(s) IV Push every 15 minutes  heparin  Infusion 500 Unit(s)/Hr (6.25 mL/Hr) IV Continuous <Continuous>  hydrocortisone sodium succinate Injectable 50 milliGRAM(s) IV Push every 8 hours  insulin lispro (ADMELOG) corrective regimen sliding scale   SubCutaneous every 6 hours  insulin regular Infusion 6 Unit(s)/Hr (6 mL/Hr) IV Continuous <Continuous>  meropenem  IVPB 1000 milliGRAM(s) IV Intermittent every 12 hours  pantoprazole  Injectable 40 milliGRAM(s) IV Push two times a day  propofol Infusion 10 MICROgram(s)/kG/Min (5.57 mL/Hr) IV Continuous <Continuous>  sodium chloride 0.9%. 1000 milliLiter(s) (10 mL/Hr) IV Continuous <Continuous>  vasopressin Infusion 0.1 Unit(s)/Min (15 mL/Hr) IV Continuous <Continuous>                                    9.2    15.27 )-----------( 118      ( 2022 04:01 )             27.9       11-    152<H>  |  108  |  47<H>  ----------------------------<  127<H>  4.1   |  30  |  3.81<H>    Ca    9.1      2022 00:33  Phos  4.8       Mg     2.4         TPro  5.8<L>  /  Alb  3.1<L>  /  TBili  1.1  /  DBili  x   /  AST  556<H>  /  ALT  223<H>  /  AlkPhos  62        PT/INR - ( 2022 00:33 )   PT: 12.9 sec;   INR: 1.12 ratio         PTT - ( 2022 06:38 )  PTT:37.1 sec    Mode: AC/ CMV (Assist Control/ Continuous Mandatory Ventilation)  RR (machine): 14  FiO2: 40  PEEP: 14  ITime: 1.4  MAP: 18  PC: 10  PIP: 25      Daily     Daily Weight in k.6 (2022 00:00)       @ 07:01  -   @ 07:00  --------------------------------------------------------  IN: 2113.3 mL / OUT: 6710 mL / NET: -4596.7 mL        Critically Ill patient  : [ ] preoperative ,   [x] post operative    Requires :  [x] Arterial Line   [x] Central Line  [ ] PA catheter  [ ] IABP  [x] ECMO - [facility]  [ ] LVAD  [x] Ventilator  [ ] pacemaker [ ] Impella.                      [x ] ABG's     [ x] Pulse Oxymetry Monitoring  Bedside evaluation , monitoring , treatment of hemodynamics , fluids , IVP/ IVCD meds.        Diagnosis:     POD 2 [facility] ECMO s/p arrest    Hypovolemia    Respiratory Failure     Ventilator Management:  [x]AC-rest    [ ]CPAP-PS Wean    [ ]Trach Collar     [ ]Extubate    [ ] T-Piece  [x]peep>5     +14    Respiratory status required full ventilatory support, close monitoring of respiratory rate and breathing pattern, the following of ABG’s with A-line monitoring, continuous pulse oximetry monitoring     Requires chest PT, pulmonary toilet, ambu bagging, suctioning to maintain SaO2,  patent airway and treat atelectasis.     IVCD Propofol and Precedex for vent synchrony    Hemodynamic lability,  instability. Requires IVCD [x] vasopressors- [] inotropes  [ ] vasodilator  [x]IVSS fluid  to maintain MAP, perfusion, C.I.      CHF- acute [ x]   chronic [  ]    systolic [ x]   diatolic [ ]          - Echo- EF -    8%      [ ] RV dysfunction          - Cxr-cardiomegally, edema          - Clinical-  [ ]inotropes   [x] pressors-  [ ]diuresis   [ ]IABP   [x]ECMO   [ ]LVAD   [x ]Respiratory Failure.     IVCD anticoagulation with [x] Heparin  [ ] Argatroban     VA ECMO circuit    Renal Failure - Acute Kidney Injury     Hypernatremia    Ophelia 20 ppm    pancreatitis - gall stone     Cardiogenic Shock - EF ? 8   ?    Thrombocytopenia    DM- IVCD Insulin    Requires bedside physical therapy, mobilization and total retirement care.         By signing my name below, I, Maria D'Amico, attest that this documentation has been prepared under the direction and in the presence of Finn Zelaya MD.   Electronically Signed: Maria D'Amico, Scribe 22 @ 07:09    I, Finn Zelaya, personally performed the services described in this documentation. All medical record entries made by the scribe were at my direction and in my presence. I have reviewed the chart and agree that the record reflects my personal performance and is accurate and complete.   Finn Zelaya MD.       Discussed with CT surgeon, Physician Assistant - Nurse Practitioner- Critical care medicine team.   Discussed at  AM / PM rounds.   Chart, labs , films reviewed.    Cumulative Critical Care Time Given Today:  90 min CRITICAL CARE ATTENDING - [facility]    MEDICATIONS  (STANDING):  artificial  tears Solution 1 Drop(s) Both EYES four times a day  chlorhexidine 0.12% Liquid 15 milliLiter(s) Oral Mucosa every 12 hours  chlorhexidine 2% Cloths 1 Application(s) Topical <User Schedule>  chlorhexidine 4% Liquid 1 Application(s) Topical <User Schedule>  dexMEDEtomidine Infusion 1.5 MICROgram(s)/kG/Hr (34.8 mL/Hr) IV Continuous <Continuous>  dextrose 50% Injectable 50 milliLiter(s) IV Push every 15 minutes  heparin  Infusion 500 Unit(s)/Hr (6.25 mL/Hr) IV Continuous <Continuous>  hydrocortisone sodium succinate Injectable 50 milliGRAM(s) IV Push every 8 hours  insulin lispro (ADMELOG) corrective regimen sliding scale   SubCutaneous every 6 hours  insulin regular Infusion 6 Unit(s)/Hr (6 mL/Hr) IV Continuous <Continuous>  meropenem  IVPB 1000 milliGRAM(s) IV Intermittent every 12 hours  pantoprazole  Injectable 40 milliGRAM(s) IV Push two times a day  propofol Infusion 10 MICROgram(s)/kG/Min (5.57 mL/Hr) IV Continuous <Continuous>  sodium chloride 0.9%. 1000 milliLiter(s) (10 mL/Hr) IV Continuous <Continuous>  vasopressin Infusion 0.1 Unit(s)/Min (15 mL/Hr) IV Continuous <Continuous>                                    9.2    15.27 )-----------( 118      ( 2022 04:01 )             27.9       11-    152<H>  |  108  |  47<H>  ----------------------------<  127<H>  4.1   |  30  |  3.81<H>    Ca    9.1      2022 00:33  Phos  4.8       Mg     2.4         TPro  5.8<L>  /  Alb  3.1<L>  /  TBili  1.1  /  DBili  x   /  AST  556<H>  /  ALT  223<H>  /  AlkPhos  62        PT/INR - ( 2022 00:33 )   PT: 12.9 sec;   INR: 1.12 ratio         PTT - ( 2022 06:38 )  PTT:37.1 sec    Mode: AC/ CMV (Assist Control/ Continuous Mandatory Ventilation)  RR (machine): 14  FiO2: 40  PEEP: 14  ITime: 1.4  MAP: 18  PC: 10  PIP: 25      Daily     Daily Weight in k.6 (2022 00:00)       @ 07:01  -   @ 07:00  --------------------------------------------------------  IN: 2113.3 mL / OUT: 6710 mL / NET: -4596.7 mL        Critically Ill patient  : [ ] preoperative ,   [x] post operative    Requires :  [x] Arterial Line   [x] Central Line  [ ] PA catheter  [ ] IABP  [x] ECMO - [facility]  [ ] LVAD  [x] Ventilator  [ ] pacemaker [ ] Impella.                      [x ] ABG's     [ x] Pulse Oxymetry Monitoring  Bedside evaluation , monitoring , treatment of hemodynamics , fluids , IVP/ IVCD meds.        Diagnosis:     POD 2 VA ECMO s/p arrest    Hypovolemia    Respiratory Failure     Ventilator Management:  [x]AC-rest    [ ]CPAP-PS Wean    [ ]Trach Collar     [ ]Extubate    [ ] T-Piece  [x]peep>5     +14    Respiratory status required full ventilatory support, close monitoring of respiratory rate and breathing pattern, the following of ABG’s with A-line monitoring, continuous pulse oximetry monitoring     Requires chest PT, pulmonary toilet, ambu bagging, suctioning to maintain SaO2,  patent airway and treat atelectasis.     IVCD Propofol and Precedex for vent synchrony    Hemodynamic lability,  instability. Requires IVCD [x] vasopressors- [] inotropes  [ ] vasodilator  [x]IVSS fluid  to maintain MAP, perfusion, C.I.      CHF- acute [ x]   chronic [  ]    systolic [ x]   diatolic [ ]          - Echo- EF -    8%      [ ] RV dysfunction          - Cxr-cardiomegally, edema          - Clinical-  [ ]inotropes   [x] pressors-  [ ]diuresis   [ ]IABP   [x]ECMO   [ ]LVAD   [x ]Respiratory Failure.     IVCD anticoagulation with [x] Heparin  [ ] Argatroban     VA ECMO circuit    Renal Failure - Acute Kidney Injury     Hypernatremia    Ophelia 20 ppm    pancreatitis - gall stone     Cardiogenic Shock - EF ? 8   ?    Thrombocytopenia    DM- IVCD Insulin    Requires bedside physical therapy, mobilization and total group home care.         By signing my name below, I, Maria D'Amico, attest that this documentation has been prepared under the direction and in the presence of Finn Zelaya MD.   Electronically Signed: Maria D'Amico, Scribe 22 @ 07:09    I, Finn Zelaya, personally performed the services described in this documentation. All medical record entries made by the scribe were at my direction and in my presence. I have reviewed the chart and agree that the record reflects my personal performance and is accurate and complete.   Finn Zelaya MD.       Discussed with CT surgeon, Physician Assistant - Nurse Practitioner- Critical care medicine team.   Discussed at  AM / PM rounds.   Chart, labs , films reviewed.    Cumulative Critical Care Time Given Today:  90 min CRITICAL CARE ATTENDING - [facility]ICU    MEDICATIONS  (STANDING):  artificial  tears Solution 1 Drop(s) Both EYES four times a day  chlorhexidine 0.12% Liquid 15 milliLiter(s) Oral Mucosa every 12 hours  chlorhexidine 2% Cloths 1 Application(s) Topical <User Schedule>  chlorhexidine 4% Liquid 1 Application(s) Topical <User Schedule>  dexMEDEtomidine Infusion 1.5 MICROgram(s)/kG/Hr (34.8 mL/Hr) IV Continuous <Continuous>  dextrose 50% Injectable 50 milliLiter(s) IV Push every 15 minutes  heparin  Infusion 500 Unit(s)/Hr (6.25 mL/Hr) IV Continuous <Continuous>  hydrocortisone sodium succinate Injectable 50 milliGRAM(s) IV Push every 8 hours  insulin lispro (ADMELOG) corrective regimen sliding scale   SubCutaneous every 6 hours  insulin regular Infusion 6 Unit(s)/Hr (6 mL/Hr) IV Continuous <Continuous>  meropenem  IVPB 1000 milliGRAM(s) IV Intermittent every 12 hours  pantoprazole  Injectable 40 milliGRAM(s) IV Push two times a day  propofol Infusion 10 MICROgram(s)/kG/Min (5.57 mL/Hr) IV Continuous <Continuous>  sodium chloride 0.9%. 1000 milliLiter(s) (10 mL/Hr) IV Continuous <Continuous>  vasopressin Infusion 0.1 Unit(s)/Min (15 mL/Hr) IV Continuous <Continuous>                                    9.2    15.27 )-----------( 118      ( 2022 04:01 )             27.9       11-    152<H>  |  108  |  47<H>  ----------------------------<  127<H>  4.1   |  30  |  3.81<H>    Ca    9.1      2022 00:33  Phos  4.8       Mg     2.4         TPro  5.8<L>  /  Alb  3.1<L>  /  TBili  1.1  /  DBili  x   /  AST  556<H>  /  ALT  223<H>  /  AlkPhos  62        PT/INR - ( 2022 00:33 )   PT: 12.9 sec;   INR: 1.12 ratio         PTT - ( 2022 06:38 )  PTT:37.1 sec    Mode: AC/ CMV (Assist Control/ Continuous Mandatory Ventilation)  RR (machine): 14  FiO2: 40  PEEP: 14  ITime: 1.4  MAP: 18  PC: 10  PIP: 25      Daily     Daily Weight in k.6 (2022 00:00)       @ 07:01  -   @ 07:00  --------------------------------------------------------  IN: 2113.3 mL / OUT: 6710 mL / NET: -4596.7 mL        Critically Ill patient  : [ ] preoperative ,   [x] post operative    Requires :  [x] Arterial Line   [x] Central Line  [ ] PA catheter  [ ] IABP  [x] ECMO - [facility]  [ ] LVAD  [x] Ventilator  [ ] pacemaker [ ] Impella.                      [x ] ABG's     [ x] Pulse Oxymetry Monitoring  Bedside evaluation , monitoring , treatment of hemodynamics , fluids , IVP/ IVCD meds.        Diagnosis:     POD 2 [facility] ECMO s/p arrest    Hypovolemia    Respiratory Failure     Ventilator Management:  [x]AC-rest    [ ]CPAP-PS Wean    [ ]Trach Collar     [ ]Extubate    [ ] T-Piece  [x]peep>5     +14    Respiratory status required full ventilatory support, close monitoring of respiratory rate and breathing pattern, the following of ABG’s with A-line monitoring, continuous pulse oximetry monitoring     Requires chest PT, pulmonary toilet, ambu bagging, suctioning to maintain SaO2,  patent airway and treat atelectasis.     IVCD Propofol and Precedex for vent synchrony    Hemodynamic lability,  instability. Requires IVCD [x] vasopressors- [] inotropes  [ ] vasodilator  [x]IVSS fluid  to maintain MAP, perfusion, C.I.      CHF- acute [ x]   chronic [  ]    systolic [ x]   diatolic [ ]          - Echo- EF -    8%      [ ] RV dysfunction          - Cxr-cardiomegally, edema          - Clinical-  [ ]inotropes   [x] pressors-  [ ]diuresis   [ ]IABP   [x]ECMO   [ ]LVAD   [x ]Respiratory Failure.     IVCD anticoagulation with [x] Heparin  [ ] Argatroban     VA ECMO circuit    Renal Failure - Acute Kidney Injury     Hypernatremia    Ophelia 20 ppm    pancreatitis - gall stone     Cardiogenic Shock - EF ? 8   ?    Thrombocytopenia    DM- IVCD Insulin    Requires bedside physical therapy, mobilization and total long-term care.         By signing my name below, I, Maria D'Amico, attest that this documentation has been prepared under the direction and in the presence of Finn Zelaya MD.   Electronically Signed: Maria D'Amico, Scribe 22 @ 07:09    I, Finn Zelaya, personally performed the services described in this documentation. All medical record entries made by the scribe were at my direction and in my presence. I have reviewed the chart and agree that the record reflects my personal performance and is accurate and complete.   Finn Zelaya MD.       Discussed with CT surgeon, Physician Assistant - Nurse Practitioner- Critical care medicine team.   Discussed at  AM / PM rounds.   Chart, labs , films reviewed.    Cumulative Critical Care Time Given Today:  90 min

## 2022-11-27 NOTE — DIETITIAN INITIAL EVALUATION ADULT - ADD RECOMMEND
Monitor GI tolerance to feeds, RD remains available to adjust TF regimen/formulary as needed/upon request. Continue to monitor nutritional intake, labs, weights, BM, skin, clinical course. Monitor for malnutrition.

## 2022-11-27 NOTE — DIETITIAN INITIAL EVALUATION ADULT - OTHER INFO
-- Pt intubated, sedated on Precedex and Propofol (currently @ 5.5mL/hr, x24hr would provide 132mL or ~145kcal). Pt has received 148.9mL x previous 24hr or ~164kcal from lipid.  -- VA ECMO  -- Vasopressin currently off  -- HbA1c 7.3% noted, pt currently on insulin gtt for BG management. DM regimen PTA unknown at this time.   -- IVF: NS @ 10mL/hr    Weight Hx: dosing wt 204.8lbs via standing scale 11/24. UBW unknown at this time. Most recent weight 208.5lbs (11/27). Pt likely with fluid shifts, will continue to monitor/trend as able.

## 2022-11-28 DIAGNOSIS — R57.0 CARDIOGENIC SHOCK: ICD-10-CM

## 2022-11-28 DIAGNOSIS — I62.00 NONTRAUMATIC SUBDURAL HEMORRHAGE, UNSPECIFIED: ICD-10-CM

## 2022-11-28 DIAGNOSIS — N17.9 ACUTE KIDNEY FAILURE, UNSPECIFIED: ICD-10-CM

## 2022-11-28 LAB
ALBUMIN SERPL ELPH-MCNC: 3.2 G/DL — LOW (ref 3.3–5)
ALP SERPL-CCNC: 63 U/L — SIGNIFICANT CHANGE UP (ref 40–120)
ALT FLD-CCNC: 72 U/L — HIGH (ref 10–45)
AMYLASE P1 CFR SERPL: 128 U/L — HIGH (ref 25–125)
ANION GAP SERPL CALC-SCNC: 15 MMOL/L — SIGNIFICANT CHANGE UP (ref 5–17)
APTT BLD: 35.1 SEC — SIGNIFICANT CHANGE UP (ref 27.5–35.5)
APTT BLD: 40.5 SEC — HIGH (ref 27.5–35.5)
APTT BLD: 40.6 SEC — HIGH (ref 27.5–35.5)
AST SERPL-CCNC: 228 U/L — HIGH (ref 10–40)
BASE EXCESS BLDMV CALC-SCNC: -0.2 MMOL/L — SIGNIFICANT CHANGE UP (ref -3–3)
BASE EXCESS BLDMV CALC-SCNC: -2.4 MMOL/L — SIGNIFICANT CHANGE UP (ref -3–3)
BASE EXCESS BLDV CALC-SCNC: 0.4 MMOL/L — SIGNIFICANT CHANGE UP (ref -2–3)
BASE EXCESS BLDV CALC-SCNC: 0.6 MMOL/L — SIGNIFICANT CHANGE UP (ref -2–3)
BILIRUB SERPL-MCNC: 0.8 MG/DL — SIGNIFICANT CHANGE UP (ref 0.2–1.2)
BLOOD GAS VENOUS - CREATININE: SIGNIFICANT CHANGE UP MG/DL (ref 0.5–1.3)
BUN SERPL-MCNC: 63 MG/DL — HIGH (ref 7–23)
CA-I SERPL-SCNC: 1.14 MMOL/L — LOW (ref 1.15–1.33)
CALCIUM SERPL-MCNC: 8.7 MG/DL — SIGNIFICANT CHANGE UP (ref 8.4–10.5)
CHLORIDE BLDV-SCNC: 116 MMOL/L — HIGH (ref 96–108)
CHLORIDE SERPL-SCNC: 112 MMOL/L — HIGH (ref 96–108)
CK MB BLD-MCNC: 2.3 % — SIGNIFICANT CHANGE UP (ref 0–3.5)
CK MB CFR SERPL CALC: 9.9 NG/ML — HIGH (ref 0–6.7)
CK SERPL-CCNC: 426 U/L — HIGH (ref 30–200)
CO2 BLDMV-SCNC: 24 MMOL/L — SIGNIFICANT CHANGE UP (ref 21–29)
CO2 BLDMV-SCNC: 26 MMOL/L — SIGNIFICANT CHANGE UP (ref 21–29)
CO2 BLDV-SCNC: 26 MMOL/L — SIGNIFICANT CHANGE UP (ref 22–26)
CO2 BLDV-SCNC: 27 MMOL/L — HIGH (ref 22–26)
CO2 SERPL-SCNC: 25 MMOL/L — SIGNIFICANT CHANGE UP (ref 22–31)
CREAT SERPL-MCNC: 3.95 MG/DL — HIGH (ref 0.5–1.3)
CULTURE RESULTS: SIGNIFICANT CHANGE UP
EGFR: 16 ML/MIN/1.73M2 — LOW
FIBRINOGEN PPP-MCNC: 719 MG/DL — HIGH (ref 200–445)
GAS PNL BLDA: SIGNIFICANT CHANGE UP
GAS PNL BLDMV: SIGNIFICANT CHANGE UP
GAS PNL BLDV: 148 MMOL/L — HIGH (ref 136–145)
GAS PNL BLDV: SIGNIFICANT CHANGE UP
GLUCOSE BLDC GLUCOMTR-MCNC: 108 MG/DL — HIGH (ref 70–99)
GLUCOSE BLDC GLUCOMTR-MCNC: 118 MG/DL — HIGH (ref 70–99)
GLUCOSE BLDC GLUCOMTR-MCNC: 119 MG/DL — HIGH (ref 70–99)
GLUCOSE BLDC GLUCOMTR-MCNC: 125 MG/DL — HIGH (ref 70–99)
GLUCOSE BLDC GLUCOMTR-MCNC: 129 MG/DL — HIGH (ref 70–99)
GLUCOSE BLDC GLUCOMTR-MCNC: 136 MG/DL — HIGH (ref 70–99)
GLUCOSE BLDC GLUCOMTR-MCNC: 139 MG/DL — HIGH (ref 70–99)
GLUCOSE BLDC GLUCOMTR-MCNC: 142 MG/DL — HIGH (ref 70–99)
GLUCOSE BLDC GLUCOMTR-MCNC: 147 MG/DL — HIGH (ref 70–99)
GLUCOSE BLDC GLUCOMTR-MCNC: 153 MG/DL — HIGH (ref 70–99)
GLUCOSE BLDC GLUCOMTR-MCNC: 158 MG/DL — HIGH (ref 70–99)
GLUCOSE BLDC GLUCOMTR-MCNC: 159 MG/DL — HIGH (ref 70–99)
GLUCOSE BLDC GLUCOMTR-MCNC: 160 MG/DL — HIGH (ref 70–99)
GLUCOSE BLDC GLUCOMTR-MCNC: 161 MG/DL — HIGH (ref 70–99)
GLUCOSE BLDC GLUCOMTR-MCNC: 167 MG/DL — HIGH (ref 70–99)
GLUCOSE BLDC GLUCOMTR-MCNC: 168 MG/DL — HIGH (ref 70–99)
GLUCOSE BLDC GLUCOMTR-MCNC: 171 MG/DL — HIGH (ref 70–99)
GLUCOSE BLDC GLUCOMTR-MCNC: 178 MG/DL — HIGH (ref 70–99)
GLUCOSE BLDC GLUCOMTR-MCNC: 180 MG/DL — HIGH (ref 70–99)
GLUCOSE BLDC GLUCOMTR-MCNC: 186 MG/DL — HIGH (ref 70–99)
GLUCOSE BLDC GLUCOMTR-MCNC: 97 MG/DL — SIGNIFICANT CHANGE UP (ref 70–99)
GLUCOSE BLDV-MCNC: 155 MG/DL — HIGH (ref 70–99)
GLUCOSE SERPL-MCNC: 157 MG/DL — HIGH (ref 70–99)
HAPTOGLOB SERPL-MCNC: 118 MG/DL — SIGNIFICANT CHANGE UP (ref 34–200)
HCO3 BLDMV-SCNC: 23 MMOL/L — SIGNIFICANT CHANGE UP (ref 20–28)
HCO3 BLDMV-SCNC: 25 MMOL/L — SIGNIFICANT CHANGE UP (ref 20–28)
HCO3 BLDV-SCNC: 24 MMOL/L — SIGNIFICANT CHANGE UP (ref 22–29)
HCO3 BLDV-SCNC: 26 MMOL/L — SIGNIFICANT CHANGE UP (ref 22–29)
HCT VFR BLD CALC: 26 % — LOW (ref 39–50)
HCT VFR BLDA CALC: 26 % — LOW (ref 39–51)
HGB BLD CALC-MCNC: 8.5 G/DL — LOW (ref 12.6–17.4)
HGB BLD-MCNC: 8.5 G/DL — LOW (ref 13–17)
HOROWITZ INDEX BLDMV+IHG-RTO: 100 — SIGNIFICANT CHANGE UP
HOROWITZ INDEX BLDV+IHG-RTO: 100 — SIGNIFICANT CHANGE UP
INR BLD: 1.01 RATIO — SIGNIFICANT CHANGE UP (ref 0.88–1.16)
LACTATE BLDV-MCNC: 1.2 MMOL/L — SIGNIFICANT CHANGE UP (ref 0.5–2)
LDH SERPL L TO P-CCNC: 974 U/L — HIGH (ref 50–242)
LIDOCAIN IGE QN: 40 U/L — SIGNIFICANT CHANGE UP (ref 7–60)
MAGNESIUM SERPL-MCNC: 2.4 MG/DL — SIGNIFICANT CHANGE UP (ref 1.6–2.6)
MCHC RBC-ENTMCNC: 29.1 PG — SIGNIFICANT CHANGE UP (ref 27–34)
MCHC RBC-ENTMCNC: 32.7 GM/DL — SIGNIFICANT CHANGE UP (ref 32–36)
MCV RBC AUTO: 89 FL — SIGNIFICANT CHANGE UP (ref 80–100)
NRBC # BLD: 0 /100 WBCS — SIGNIFICANT CHANGE UP (ref 0–0)
O2 CT VFR BLD CALC: 45 MMHG — SIGNIFICANT CHANGE UP (ref 30–65)
O2 CT VFR BLD CALC: 46 MMHG — SIGNIFICANT CHANGE UP (ref 30–65)
PCO2 BLDMV: 41 MMHG — SIGNIFICANT CHANGE UP (ref 30–65)
PCO2 BLDMV: 42 MMHG — SIGNIFICANT CHANGE UP (ref 30–65)
PCO2 BLDV: 36 MMHG — LOW (ref 42–55)
PCO2 BLDV: 46 MMHG — SIGNIFICANT CHANGE UP (ref 42–55)
PH BLDMV: 7.35 — SIGNIFICANT CHANGE UP (ref 7.32–7.45)
PH BLDMV: 7.39 — SIGNIFICANT CHANGE UP (ref 7.32–7.45)
PH BLDV: 7.36 — SIGNIFICANT CHANGE UP (ref 7.32–7.43)
PH BLDV: 7.44 — HIGH (ref 7.32–7.43)
PHOSPHATE SERPL-MCNC: 6.5 MG/DL — HIGH (ref 2.5–4.5)
PLATELET # BLD AUTO: 102 K/UL — LOW (ref 150–400)
PO2 BLDV: 40 MMHG — SIGNIFICANT CHANGE UP (ref 25–45)
PO2 BLDV: 56 MMHG — HIGH (ref 25–45)
POTASSIUM BLDA-SCNC: 3.2 MMOL/L — LOW (ref 3.5–5.1)
POTASSIUM BLDV-SCNC: 3.9 MMOL/L — SIGNIFICANT CHANGE UP (ref 3.5–5.1)
POTASSIUM SERPL-MCNC: 4 MMOL/L — SIGNIFICANT CHANGE UP (ref 3.5–5.3)
POTASSIUM SERPL-SCNC: 4 MMOL/L — SIGNIFICANT CHANGE UP (ref 3.5–5.3)
PROT SERPL-MCNC: 6.1 G/DL — SIGNIFICANT CHANGE UP (ref 6–8.3)
PROTHROM AB SERPL-ACNC: 11.7 SEC — SIGNIFICANT CHANGE UP (ref 10.5–13.4)
RBC # BLD: 2.92 M/UL — LOW (ref 4.2–5.8)
RBC # FLD: 15.4 % — HIGH (ref 10.3–14.5)
SAO2 % BLDMV: 74.1 — SIGNIFICANT CHANGE UP (ref 60–90)
SAO2 % BLDMV: 75.8 — SIGNIFICANT CHANGE UP (ref 60–90)
SAO2 % BLDV: 70.6 % — SIGNIFICANT CHANGE UP (ref 67–88)
SAO2 % BLDV: 83.4 % — SIGNIFICANT CHANGE UP (ref 67–88)
SODIUM SERPL-SCNC: 152 MMOL/L — HIGH (ref 135–145)
SPECIMEN SOURCE: SIGNIFICANT CHANGE UP
TROPONIN T, HIGH SENSITIVITY RESULT: 8600 NG/L — HIGH (ref 0–51)
WBC # BLD: 13.88 K/UL — HIGH (ref 3.8–10.5)
WBC # FLD AUTO: 13.88 K/UL — HIGH (ref 3.8–10.5)

## 2022-11-28 PROCEDURE — 71045 X-RAY EXAM CHEST 1 VIEW: CPT | Mod: 26,77

## 2022-11-28 PROCEDURE — 99292 CRITICAL CARE ADDL 30 MIN: CPT

## 2022-11-28 PROCEDURE — 33949 ECMO/ECLS DAILY MGMT ARTERY: CPT

## 2022-11-28 PROCEDURE — 99223 1ST HOSP IP/OBS HIGH 75: CPT | Mod: GC

## 2022-11-28 PROCEDURE — 71045 X-RAY EXAM CHEST 1 VIEW: CPT | Mod: 26

## 2022-11-28 PROCEDURE — 99291 CRITICAL CARE FIRST HOUR: CPT

## 2022-11-28 PROCEDURE — 99292 CRITICAL CARE ADDL 30 MIN: CPT | Mod: 25

## 2022-11-28 PROCEDURE — 99291 CRITICAL CARE FIRST HOUR: CPT | Mod: 25

## 2022-11-28 PROCEDURE — 93306 TTE W/DOPPLER COMPLETE: CPT | Mod: 26

## 2022-11-28 PROCEDURE — 99222 1ST HOSP IP/OBS MODERATE 55: CPT

## 2022-11-28 RX ORDER — POTASSIUM CHLORIDE 20 MEQ
10 PACKET (EA) ORAL
Refills: 0 | Status: COMPLETED | OUTPATIENT
Start: 2022-11-28 | End: 2022-11-28

## 2022-11-28 RX ORDER — CALCIUM GLUCONATE 100 MG/ML
2 VIAL (ML) INTRAVENOUS ONCE
Refills: 0 | Status: COMPLETED | OUTPATIENT
Start: 2022-11-28 | End: 2022-11-28

## 2022-11-28 RX ORDER — VASOPRESSIN 20 [USP'U]/ML
0.03 INJECTION INTRAVENOUS
Qty: 40 | Refills: 0 | Status: DISCONTINUED | OUTPATIENT
Start: 2022-11-28 | End: 2022-11-29

## 2022-11-28 RX ORDER — SENNA PLUS 8.6 MG/1
2 TABLET ORAL AT BEDTIME
Refills: 0 | Status: DISCONTINUED | OUTPATIENT
Start: 2022-11-28 | End: 2022-12-08

## 2022-11-28 RX ORDER — DOBUTAMINE HCL 250MG/20ML
2.5 VIAL (ML) INTRAVENOUS
Qty: 500 | Refills: 0 | Status: DISCONTINUED | OUTPATIENT
Start: 2022-11-28 | End: 2022-11-28

## 2022-11-28 RX ORDER — MAGNESIUM SULFATE 500 MG/ML
1 VIAL (ML) INJECTION ONCE
Refills: 0 | Status: COMPLETED | OUTPATIENT
Start: 2022-11-28 | End: 2022-11-28

## 2022-11-28 RX ORDER — NICARDIPINE HYDROCHLORIDE 30 MG/1
2 CAPSULE, EXTENDED RELEASE ORAL
Qty: 40 | Refills: 0 | Status: DISCONTINUED | OUTPATIENT
Start: 2022-11-28 | End: 2022-12-04

## 2022-11-28 RX ORDER — POLYETHYLENE GLYCOL 3350 17 G/17G
17 POWDER, FOR SOLUTION ORAL
Refills: 0 | Status: DISCONTINUED | OUTPATIENT
Start: 2022-11-28 | End: 2022-12-08

## 2022-11-28 RX ORDER — POTASSIUM CHLORIDE 20 MEQ
10 PACKET (EA) ORAL
Refills: 0 | Status: DISCONTINUED | OUTPATIENT
Start: 2022-11-28 | End: 2022-11-28

## 2022-11-28 RX ORDER — MEROPENEM 1 G/30ML
500 INJECTION INTRAVENOUS EVERY 12 HOURS
Refills: 0 | Status: DISCONTINUED | OUTPATIENT
Start: 2022-11-28 | End: 2022-12-04

## 2022-11-28 RX ORDER — HYDROCORTISONE 20 MG
25 TABLET ORAL EVERY 8 HOURS
Refills: 0 | Status: DISCONTINUED | OUTPATIENT
Start: 2022-11-28 | End: 2022-11-30

## 2022-11-28 RX ORDER — ALBUMIN HUMAN 25 %
50 VIAL (ML) INTRAVENOUS
Refills: 0 | Status: COMPLETED | OUTPATIENT
Start: 2022-11-28 | End: 2022-11-28

## 2022-11-28 RX ORDER — ALBUMIN HUMAN 25 %
100 VIAL (ML) INTRAVENOUS ONCE
Refills: 0 | Status: COMPLETED | OUTPATIENT
Start: 2022-11-28 | End: 2022-11-28

## 2022-11-28 RX ADMIN — Medication 50 MILLIEQUIVALENT(S): at 21:20

## 2022-11-28 RX ADMIN — Medication 25 MILLIGRAM(S): at 12:33

## 2022-11-28 RX ADMIN — Medication 50 MILLIGRAM(S): at 05:28

## 2022-11-28 RX ADMIN — MEROPENEM 100 MILLIGRAM(S): 1 INJECTION INTRAVENOUS at 10:29

## 2022-11-28 RX ADMIN — Medication 50 MILLIEQUIVALENT(S): at 20:15

## 2022-11-28 RX ADMIN — Medication 1 DROP(S): at 12:18

## 2022-11-28 RX ADMIN — MEROPENEM 100 MILLIGRAM(S): 1 INJECTION INTRAVENOUS at 17:56

## 2022-11-28 RX ADMIN — POLYETHYLENE GLYCOL 3350 17 GRAM(S): 17 POWDER, FOR SOLUTION ORAL at 12:18

## 2022-11-28 RX ADMIN — Medication 200 GRAM(S): at 11:27

## 2022-11-28 RX ADMIN — Medication 1 DROP(S): at 18:03

## 2022-11-28 RX ADMIN — CHLORHEXIDINE GLUCONATE 1 APPLICATION(S): 213 SOLUTION TOPICAL at 05:30

## 2022-11-28 RX ADMIN — MEROPENEM 100 MILLIGRAM(S): 1 INJECTION INTRAVENOUS at 05:25

## 2022-11-28 RX ADMIN — Medication 50 MILLIEQUIVALENT(S): at 17:56

## 2022-11-28 RX ADMIN — CHLORHEXIDINE GLUCONATE 1 APPLICATION(S): 213 SOLUTION TOPICAL at 05:22

## 2022-11-28 RX ADMIN — Medication 100 MILLILITER(S): at 11:46

## 2022-11-28 RX ADMIN — Medication 25 MILLIGRAM(S): at 20:44

## 2022-11-28 RX ADMIN — Medication 1 DROP(S): at 05:24

## 2022-11-28 RX ADMIN — Medication 50 MILLIEQUIVALENT(S): at 08:09

## 2022-11-28 RX ADMIN — CHLORHEXIDINE GLUCONATE 15 MILLILITER(S): 213 SOLUTION TOPICAL at 05:24

## 2022-11-28 RX ADMIN — SENNA PLUS 2 TABLET(S): 8.6 TABLET ORAL at 21:35

## 2022-11-28 RX ADMIN — Medication 100 MILLILITER(S): at 11:39

## 2022-11-28 RX ADMIN — DEXMEDETOMIDINE HYDROCHLORIDE IN 0.9% SODIUM CHLORIDE 34.8 MICROGRAM(S)/KG/HR: 4 INJECTION INTRAVENOUS at 20:45

## 2022-11-28 RX ADMIN — Medication 50 MILLIEQUIVALENT(S): at 11:34

## 2022-11-28 RX ADMIN — PANTOPRAZOLE SODIUM 40 MILLIGRAM(S): 20 TABLET, DELAYED RELEASE ORAL at 05:28

## 2022-11-28 RX ADMIN — CHLORHEXIDINE GLUCONATE 15 MILLILITER(S): 213 SOLUTION TOPICAL at 17:55

## 2022-11-28 RX ADMIN — Medication 50 MILLILITER(S): at 04:18

## 2022-11-28 RX ADMIN — Medication 50 MILLIEQUIVALENT(S): at 17:05

## 2022-11-28 RX ADMIN — INSULIN HUMAN 6 UNIT(S)/HR: 100 INJECTION, SOLUTION SUBCUTANEOUS at 20:46

## 2022-11-28 RX ADMIN — Medication 50 MILLIEQUIVALENT(S): at 10:29

## 2022-11-28 RX ADMIN — Medication 100 GRAM(S): at 08:30

## 2022-11-28 RX ADMIN — PANTOPRAZOLE SODIUM 40 MILLIGRAM(S): 20 TABLET, DELAYED RELEASE ORAL at 17:55

## 2022-11-28 NOTE — CONSULT NOTE ADULT - PROBLEM SELECTOR RECOMMENDATION 2
- troponin peaked at > 73841 and coming down   - plan for [facility] once recovers form ERIC  - AST elevated I/s/o elevated cardiac enzymes  - ALT wnl  - would give statin once LFT normalize  - ASA 81mg daily once okay with CTS team - troponin peaked at > 62626 and coming down   - plan for [facility] once recovers form ERIC  - AST elevated I/s/o elevated cardiac enzymes  - ALT wnl  - would give statin once LFT normalize  - ASA 81mg daily once okay with CTS team - troponin peaked at > 93596 and coming down   - plan for [facility] once recovers form ERIC  - AST elevated I/s/o elevated cardiac enzymes  - ALT wnl  - would give statin once LFT normalize  - ASA 81mg daily once okay with CTS team - troponin peaked at > 18470 and coming down   - plan for LHC once recovers form ERIC  - ASA/statin when able - troponin peaked at > 55957 and coming down   - plan for LHC once recovers form ERIC  - ASA/statin when able - troponin peaked at > 70122 and coming down   - plan for LHC once recovers form ERIC  - ASA/statin when able

## 2022-11-28 NOTE — CONSULT NOTE ADULT - PROBLEM SELECTOR RECOMMENDATION 6
- small 3mm subdural hemorrhage stable on repeat CT  - keep MAP < 75 mmHg and careful monitoring of PTT
- home meds are Maxsulin 30/70 ; 18u qam and 16 u qpm , Metformin 1000mg po daily ; on hold while inpatient  - f/up Hb A1C  - agree with MAIKEL  - FS Q6 while NPO

## 2022-11-28 NOTE — CONSULT NOTE ADULT - PROBLEM SELECTOR RECOMMENDATION 4
- CT abd showing acute pancreatitis  - lipase > 3000 11/24, now normalized to 40  - conservative care  - appreciate GI recs, possible ERCP once stable
CABG x4  ~3yrs ago  - now with uptrending hs trops  - ?demand ischemia in setting of acute cholecystitis and acute interstitial pancreatitis  - no dynamic changes on EKG  - on DAPT , Heparin gtt per Cardiology reccs

## 2022-11-28 NOTE — CONSULT NOTE ADULT - ASSESSMENT
62 year old admitted with pancreatitis  not necrotizing on the CT  scan and cholecystitis .  pt has sent to the SICU but developed respiratory  failure presumed to be due to ARDS also with hs of CAD<CABG<DM and possible MI  Movedto the CTU for echo    not on CAVHD  cultures are negative and ct looks like ARDS.  no fever for the last 48 yrs  legs are viable    likely all problems related to pancreatitis .  agree with short 7 8 day course of meropenem despite the lack of necrosis on his ct scan  no need for vanco

## 2022-11-28 NOTE — CONSULT NOTE ADULT - ASSESSMENT
61 y/o male with PMH of CABG in [facility] ~3years ago, DM, HTN, HLD presenting as transfer from [place] for c/f STEMI. Course c/b gallstone pancreatitis leading to ARDS and shock , eventual PEA arrest, ROSC achieve ~30min, now on VA ECMO. Had significant troponin elevation and his LVEF pre arrest was ~45%, post arrest down to 8%. Pt was deemed to be too unstable to have an angiogram and potential PCI due to his co-morbidities & a new subdural bleed. transferred to CTU for further management. advanced HF consulted to assist in management of post arrest cardiogenic shock. requiring VA EMCO, currently day 4 on ECMO. 61 y/o male with PMH of CABG in [facility] ~3years ago, DM, HTN, HLD presenting as transfer from [place] for c/f STEMI. Course c/b gallstone pancreatitis leading to ARDS and shock , eventual PEA arrest, ROSC achieve ~30min, now on VA ECMO. Had significant troponin elevation and his LVEF pre arrest was ~45%, post arrest down to 8%. Pt was deemed to be too unstable to have an angiogram and potential PCI due to his co-morbidities & a new subdural bleed. transferred to CTU for further management. advanced HF consulted to assist in management of post arrest cardiogenic shock. requiring VA EMCO, currently day 4 on ECMO. 63 y/o male with PMH of CABG in [facility] ~3years ago, DM, HTN, HLD presenting as transfer from [place] for c/f STEMI. Course c/b gallstone pancreatitis leading to ARDS and shock , eventual PEA arrest, ROSC achieve ~30min, now on VA ECMO. Had significant troponin elevation and his LVEF pre arrest was ~45%, post arrest down to 8%. Pt was deemed to be too unstable to have an angiogram and potential PCI due to his co-morbidities & a new subdural bleed. transferred to CTU for further management. advanced HF consulted to assist in management of post arrest cardiogenic shock. requiring VA EMCO, currently day 4 on ECMO. 63 YO M with a history of CAD s/p 4v CABG ~2019 in [facility], DM2 (A1c 7.3%), and HTN who initially presented to OSH with abdominal pain and n/v and found to have pancreatitis with lipase > 30,000 though also with elevated troponins. CT abdomen revealed acute pancreatitis and his initial LVEF was 40-45%. He developed MSOF with hypoxic respiratory failure requiring intubation and ERIC and went into hypoxic PEA arrest requiring ACLS and due to persistent hypoxia and hypotension on pressors after ROSC was cannulated to VA ECMO (LFV, RFA, no anterograde perfusion catheter) on 11/25. Notably CTH that day revealed subdural hemorrhage.     His most recent TTE on 11/26 shows an LVEF of 5-10% with an AV that was only minimally opening. Followup TTE on 11/28 continues to show some LV dysfunction but some mild qualitative improvement. He reassuringly has 40 mmHg pulsatility despite full ECMO support. There is likely some degree of stunning post arrest and will continue to slowly wean ECMO and assess for recovery. Will plan for LHC and IABP for venting pending improvement in renal function.      61 YO M with a history of CAD s/p 4v CABG ~2019 in [facility], DM2 (A1c 7.3%), and HTN who initially presented to OSH with abdominal pain and n/v and found to have pancreatitis with lipase > 30,000 though also with elevated troponins. CT abdomen revealed acute pancreatitis and his initial LVEF was 40-45%. He developed MSOF with hypoxic respiratory failure requiring intubation and ERIC and went into hypoxic PEA arrest requiring ACLS and due to persistent hypoxia and hypotension on pressors after ROSC was cannulated to VA ECMO (LFV, RFA, no anterograde perfusion catheter) on 11/25. Notably CTH that day revealed subdural hemorrhage.     His most recent TTE on 11/26 shows an LVEF of 5-10% with an AV that was only minimally opening. Followup TTE on 11/28 continues to show some LV dysfunction but some mild qualitative improvement. He reassuringly has 40 mmHg pulsatility despite full ECMO support. There is likely some degree of stunning post arrest and will continue to slowly wean ECMO and assess for recovery. Will plan for LHC and IABP for venting pending improvement in renal function.      61 YO M with a history of CAD s/p 4v CABG ~2019 in [facility], DM2 (A1c 7.3%), and HTN who initially presented to OSH with abdominal pain and n/v and found to have pancreatitis with lipase > 30,000 though also with elevated troponins. CT abdomen revealed acute pancreatitis and his initial LVEF was 40-45%. He developed MSOF with hypoxic respiratory failure requiring intubation and ERIC and went into hypoxic PEA arrest requiring ACLS and due to persistent hypoxia and hypotension on pressors after ROSC was cannulated to VA ECMO (LFV, RFA, no anterograde perfusion catheter) on 11/25. Notably CTH that day revealed subdural hemorrhage.     His most recent TTE on 11/26 shows an LVEF of 5-10% with an AV that was only minimally opening. Followup TTE on 11/28 continues to show some LV dysfunction but some mild qualitative improvement. He reassuringly has 40 mmHg pulsatility despite full ECMO support. There is likely some degree of stunning post arrest and will continue to slowly wean ECMO and assess for recovery. Will plan for LHC and IABP for venting pending improvement in renal function.      63 YO M with a history of CAD s/p 4v CABG ~2019 in [facility], DM2 (A1c 7.3%), and HTN who initially presented to OSH with abdominal pain and n/v and found to have pancreatitis with lipase > 30,000 though also with elevated troponins. CT abdomen revealed acute pancreatitis and his initial LVEF was 40-45%. He developed MSOF with hypoxic respiratory failure requiring intubation and ERIC and went into hypoxic PEA arrest requiring ACLS and due to persistent hypoxia and hypotension on pressors after ROSC was cannulated to VA ECMO (LFV, RFA, no anterograde perfusion catheter) on 11/25. Notably CTH that day revealed small subdural hemorrhage.     His post arrest TTE on 11/26 showed a signficantly worse LVEF of 5-10% with an AV that was only minimally opening. Followup TTE on 11/28 continues to show some LV dysfunction but some mild qualitative improvement. He reassuringly has 40 mmHg pulsatility despite full ECMO support. There is likely some degree of stunning post arrest and will continue to slowly wean ECMO and assess for recovery. Will plan for LHC and IABP for venting pending improvement in renal function.      61 YO M with a history of CAD s/p 4v CABG ~2019 in [facility], DM2 (A1c 7.3%), and HTN who initially presented to OSH with abdominal pain and n/v and found to have pancreatitis with lipase > 30,000 though also with elevated troponins. CT abdomen revealed acute pancreatitis and his initial LVEF was 40-45%. He developed MSOF with hypoxic respiratory failure requiring intubation and ERIC and went into hypoxic PEA arrest requiring ACLS and due to persistent hypoxia and hypotension on pressors after ROSC was cannulated to VA ECMO (LFV, RFA, no anterograde perfusion catheter) on 11/25. Notably CTH that day revealed small subdural hemorrhage.     His post arrest TTE on 11/26 showed a signficantly worse LVEF of 5-10% with an AV that was only minimally opening. Followup TTE on 11/28 continues to show some LV dysfunction but some mild qualitative improvement. He reassuringly has 40 mmHg pulsatility despite full ECMO support. There is likely some degree of stunning post arrest and will continue to slowly wean ECMO and assess for recovery. Will plan for LHC and IABP for venting pending improvement in renal function.      63 YO M with a history of CAD s/p 4v CABG ~2019 in [facility], DM2 (A1c 7.3%), and HTN who initially presented to OSH with abdominal pain and n/v and found to have pancreatitis with lipase > 30,000 though also with elevated troponins. CT abdomen revealed acute pancreatitis and his initial LVEF was 40-45%. He developed MSOF with hypoxic respiratory failure requiring intubation and ERIC and went into hypoxic PEA arrest requiring ACLS and due to persistent hypoxia and hypotension on pressors after ROSC was cannulated to VA ECMO (LFV, RFA, no anterograde perfusion catheter) on 11/25. Notably CTH that day revealed small subdural hemorrhage.     His post arrest TTE on 11/26 showed a signficantly worse LVEF of 5-10% with an AV that was only minimally opening. Followup TTE on 11/28 continues to show some LV dysfunction but some mild qualitative improvement. He reassuringly has 40 mmHg pulsatility despite full ECMO support. There is likely some degree of stunning post arrest and will continue to slowly wean ECMO and assess for recovery. Will plan for LHC and IABP for venting pending improvement in renal function.      61 YO M with a history of CAD s/p 4v CABG ~2019 in [facility], DM2 (A1c 7.3%), and HTN who initially presented to OSH with abdominal pain and n/v and found to have pancreatitis with lipase > 30,000 though also with elevated troponins. CT abdomen revealed acute pancreatitis and his initial LVEF was 40-45%. He developed MSOF with hypoxic respiratory failure requiring intubation and ERIC and went into hypoxic PEA arrest requiring ACLS and due to persistent hypoxia and hypotension on pressors after ROSC was cannulated to VA ECMO (LFV, RFA, no anterograde perfusion catheter) on 11/25. Notably CTH that day revealed small subdural hemorrhage.     His post arrest TTE on 11/26 showed a signficantly worse LVEF of 5-10% with an AV that was only minimally opening. Followup TTE on 11/28 continues to show some LV dysfunction but some mild qualitative improvement. He reassuringly has 40 mmHg pulsatility despite full ECMO support. There is likely some degree of stunning post arrest and will continue to slowly wean ECMO and assess for recovery. Will plan for LHC and possible IABP for venting pending improvement in renal function.      61 YO M with a history of CAD s/p 4v CABG ~2019 in [facility], DM2 (A1c 7.3%), and HTN who initially presented to OSH with abdominal pain and n/v and found to have pancreatitis with lipase > 30,000 though also with elevated troponins. CT abdomen revealed acute pancreatitis and his initial LVEF was 40-45%. He developed MSOF with hypoxic respiratory failure requiring intubation and ERIC and went into hypoxic PEA arrest requiring ACLS and due to persistent hypoxia and hypotension on pressors after ROSC was cannulated to VA ECMO (LFV, RFA, no anterograde perfusion catheter) on 11/25. Notably CTH that day revealed small subdural hemorrhage.     His post arrest TTE on 11/26 showed a signficantly worse LVEF of 5-10% with an AV that was only minimally opening. Followup TTE on 11/28 continues to show some LV dysfunction but some mild qualitative improvement. He reassuringly has 40 mmHg pulsatility despite full ECMO support. There is likely some degree of stunning post arrest and will continue to slowly wean ECMO and assess for recovery. Will plan for LHC and possible IABP for venting pending improvement in renal function.      63 YO M with a history of CAD s/p 4v CABG ~2019 in [facility], DM2 (A1c 7.3%), and HTN who initially presented to OSH with abdominal pain and n/v and found to have pancreatitis with lipase > 30,000 though also with elevated troponins. CT abdomen revealed acute pancreatitis and his initial LVEF was 40-45%. He developed MSOF with hypoxic respiratory failure requiring intubation and ERIC and went into hypoxic PEA arrest requiring ACLS and due to persistent hypoxia and hypotension on pressors after ROSC was cannulated to VA ECMO (LFV, RFA, no anterograde perfusion catheter) on 11/25. Notably CTH that day revealed small subdural hemorrhage.     His post arrest TTE on 11/26 showed a signficantly worse LVEF of 5-10% with an AV that was only minimally opening. Followup TTE on 11/28 continues to show some LV dysfunction but some mild qualitative improvement. He reassuringly has 40 mmHg pulsatility despite full ECMO support. There is likely some degree of stunning post arrest and will continue to slowly wean ECMO and assess for recovery. Will plan for LHC and possible IABP for venting pending improvement in renal function.

## 2022-11-28 NOTE — CONSULT NOTE ADULT - PROBLEM SELECTOR RECOMMENDATION 9
ERIC/ ATN in the setting of STEMI, cardiac arrest & cardiogenic shock      SCr on arrival was 2.15; trended down to hector 1.6 on 11/25; now trending backup to 3.95 today. Pt is non oliguric with UOP 2.8L in 24hrs with 400cc net negative. Last lasix dose was 2 days ago.     Send UA, urine electrolytes, spot urine TP/CR. Check Renal US when stable. Maintain De Dios. Agree with intermittent diuresis for now.  Continue to optimize hemodynamics. No indication for dialysis at this time. Monitor labs and urine output. Avoid NSAIDs, RCA and nephrotoxins. Dose medications as per eGFR.        If you have any questions, please feel free to contact me via [employer] teams  Marielle Santos  Nephrology Fellow   Pager NS: [phone]/ [facility]: [phone]    (After 5 pm or on weekends please page the on-call fellow, can check [email or web address] for schedule. Login is carmen cantor, schedule under [facility] medicine, psych, derm) ERIC/ ATN in the setting of STEMI, cardiac arrest & cardiogenic shock      SCr on arrival was 2.15; trended down to hector 1.6 on 11/25; now trending backup to 3.95 today. Pt is non oliguric with UOP 2.8L in 24hrs with 400cc net negative. Last lasix dose was 2 days ago.     Send UA, urine electrolytes, spot urine TP/CR. Check Renal US when stable. Maintain De Dios. Agree with intermittent diuresis for now.  Continue to optimize hemodynamics. No indication for dialysis at this time. Monitor labs and urine output. Avoid NSAIDs, RCA and nephrotoxins. Dose medications as per eGFR.        If you have any questions, please feel free to contact me via [employer] teams  Marielle Santos  Nephrology Fellow   Pager NS: [phone]/ [facility]: [phone]    (After 5 pm or on weekends please page the on-call fellow, can check [email or web address] for schedule. Login is carmen cantor, schedule under [facility] medicine, psych, derm) ERIC/ ATN in the setting of STEMI, cardiac arrest & cardiogenic shock      SCr on arrival was 2.15; trended down to hector 1.6 on 11/25; now trending backup to 3.95 today. Pt is non oliguric with UOP 2.8L in 24hrs with 400cc net negative. Last lasix dose was 2 days ago.     Send UA, urine electrolytes, spot urine TP/CR. Check Renal US when stable. Maintain De Dios. Agree with intermittent diuresis for now.  Continue to optimize hemodynamics. No indication for dialysis at this time. Monitor labs and urine output. Avoid NSAIDs, RCA and nephrotoxins. Dose medications as per eGFR.        If you have any questions, please feel free to contact me via [employer] teams  Marielle Santos  Nephrology Fellow   Pager NS: [phone]/ [facility]: [phone]    (After 5 pm or on weekends please page the on-call fellow, can check [email or web address] for schedule. Login is carmen cantor, schedule under [facility] medicine, psych, derm)

## 2022-11-28 NOTE — CONSULT NOTE ADULT - ATTENDING COMMENTS
Multifactorial ARF in setting of severe HFrEF--> VA ECMO  1.  ARF--non oliguric, no HD required at present although need to follow closely to assess requirements for clearance and volume optimization.  Keep O>I which may require reinstitution of diuretics.  W/U as outlined.  Urinary decompression and trend UO carefully.      discussed at length with CTU team  Bolivar Morrow MD  contact me on TEAMS

## 2022-11-28 NOTE — PROCEDURE NOTE - ADDITIONAL PROCEDURE DETAILS
Under sterile conditions and with proper draping of the patient, a pulmonary artery catheter was floated through the introducer catheter in the _______Rtij _____ vein. With the ballon inflated with 1.5ml of air, the PA catheter was advanced while monitoring the pressure tracing from the central venous system to the right ventricle and to the pulmonary artery. Wedge tracing was observed at _50____ cm. The balloon was deflated, PA pressure tracing was noted again. The position of the catheter was verified by CXR. The initial readings are:  -CVP=  7   mmHg  -PA sys/vuong=     mmHg  -PCWP=     mmHg  -C.O. =  5.5    lit/min  -C.I. =  2.5    lit/min/m^2        Complications: None

## 2022-11-28 NOTE — CONSULT NOTE ADULT - PROBLEM SELECTOR RECOMMENDATION 9
- continue VA ECMO, currently at 4000rpm 4.6LPM, sweep 5 FdO2 1 (no anterograde stick, right leg wwp with dopplerable pulses)  - swan placement today  - repeat echo today showing LV function still severely depressed  - Anthony precludes LHC at this time  - IABP for LV venting and weaning of ECMO  - methylene blue  - LHC once Anthony resolves - continue VA ECMO (LFV/RFA, no anterograde perfusion), today weaned from 4.6 L/min to 4.0 L/min. Sweep currently at 5 and will wean as tolerates  - continue therapeutic anticoagulation while on VA ECMO   - place PAC today  - eventual LHC pending improvement in renal function and may need IABP for venting   - will follow TTE every ~48 hours  - deferring inotropes for now given recent ischemia and opening AV, will start if needed  - cardene PRN for goal MAP 65-75   - advanced therapies evaluation premature at this time given hope for recovery and progress at this time - continue VA ECMO (LFV/RFA, no anterograde perfusion), today weaned from 4.6 L/min to 4.0 L/min. Sweep currently at 5 and will wean as tolerates. needs close monitoring of distal pulses  - continue therapeutic anticoagulation while on VA ECMO   - place PAC today  - eventual LHC pending improvement in renal function and may need IABP for venting   - will follow TTE every ~48 hours  - deferring inotropes for now given recent ischemia and opening AV, will start if needed  - cardene PRN for goal MAP 65-75   - advanced therapies evaluation premature at this time given hope for recovery and progress at this time

## 2022-11-28 NOTE — PROGRESS NOTE ADULT - SUBJECTIVE AND OBJECTIVE BOX
EDUARDO REAL  MRN-[id number]  Patient is a 62y old  Male who presents with a chief complaint of Acute cholecystitis, gallstone pancreatitis (28 Nov 2022 08:52)    HPI:  Patient is a 61 y/o male with PMH of CABG, DM, HTN, HLD presenting as transfer from [place] for c/f STEMI. PTA arrival received 325 aspirin, 600 plavix, and no heparin drip started. Around 1:30 am patient had multiple episodes of non-bloody diarrhea and emesis with associated abdominal pain and chest pain, unable to localize, non-radiating, with associated SOB and no associated palpitations or diaphoresis. No recent fevers/chills, cough, rashes, or changes in urination. Does report mild diffuse back pain; started 5 days ago in setting on injury while walking and lifting objects. Denies focal weakness, numbness/tingling, or LOC due does report mild dizziness.    Patient seen and examined in ED. Hemodynamically stable, alert and awake, A&Ox3. Daughter at bedside. Reports abdominal pain, and nausea. Abdomen is soft, tender in epigastric area and RUQ. Denies chest pain, SOB. WBC 20, T.bili 3.4, Lipase 300. RUQ US demonstrated gallbladder stones, pericholecystic fluid.  (24 Nov 2022 15:10)    REVIEW OF SYSTEMS:  Unable to obtain due to patient being intubated    Vital Signs Last 24 Hrs  T(C): 37 (28 Nov 2022 08:00), Max: 37 (27 Nov 2022 16:00)  T(F): 98.6 (28 Nov 2022 08:00), Max: 98.6 (27 Nov 2022 16:00)  HR: 75 (28 Nov 2022 09:00) (64 - 80)  BP: --  BP(mean): --  RR: 14 (28 Nov 2022 09:00) (10 - 21)  SpO2: 100% (28 Nov 2022 09:00) (100% - 100%)    Parameters below as of 28 Nov 2022 04:00  Patient On (Oxygen Delivery Method): ventilator    O2 Concentration (%): 40    ============================I/O===========================   I&O's Detail    27 Nov 2022 07:01  -  28 Nov 2022 07:00  --------------------------------------------------------  IN:    Albumin 5% - 50 mL: 100 mL    Dexmedetomidine: 754.5 mL    Free Water: 750 mL    Heparin: 180.5 mL    Insulin: 56.5 mL    IV PiggyBack: 100 mL    Propofol: 38.5 mL    sodium chloride 0.9%: 240 mL    Vital1.5: 110 mL  Total IN: 2330 mL    OUT:    Indwelling Catheter - Urethral (mL): 2810 mL    Vasopressin: 0 mL  Total OUT: 2810 mL    Total NET: -480 mL      28 Nov 2022 07:01  -  28 Nov 2022 09:09  --------------------------------------------------------  IN:    Dexmedetomidine: 23.2 mL    Heparin: 10 mL    Insulin: 2.5 mL    IV PiggyBack: 50 mL    sodium chloride 0.9%: 10 mL    Vital1.5: 10 mL  Total IN: 105.7 mL    OUT:    Indwelling Catheter - Urethral (mL): 115 mL  Total OUT: 115 mL    Total NET: -9.3 mL  ============================ LABS =========================                        8.5    13.88 )-----------( 102      ( 28 Nov 2022 00:57 )             26.0     11-28    152<H>  |  112<H>  |  63<H>  ----------------------------<  157<H>  4.0   |  25  |  3.95<H>    Ca    8.7      28 Nov 2022 00:58  Phos  6.5     11-28  Mg     2.4     11-28    TPro  6.1  /  Alb  3.2<L>  /  TBili  0.8  /  DBili  x   /  AST  228<H>  /  ALT  72<H>  /  AlkPhos  63  11-28    LIVER FUNCTIONS - ( 28 Nov 2022 00:58 )  Alb: 3.2 g/dL / Pro: 6.1 g/dL / ALK PHOS: 63 U/L / ALT: 72 U/L / AST: 228 U/L / GGT: x           PT/INR - ( 28 Nov 2022 04:28 )   PT: 11.7 sec;   INR: 1.01 ratio         PTT - ( 28 Nov 2022 04:28 )  PTT:35.1 sec  ABG - ( 28 Nov 2022 08:55 )  pH, Arterial: 7.50  pH, Blood: x     /  pCO2: 32    /  pO2: 123   / HCO3: 25    / Base Excess: 1.9   /  SaO2: 97.3    ======================Microbiology/Radiology=================  Culture: Reviewed   CXR: Reviewed  ======================================================  PAST MEDICAL & SURGICAL HISTORY:    ====================ASSESSMENT ==============  61 y/o male with PMH of CABG, DM, HTN, HLD presenting as transfer from [place] for c/f STEMI. PTA arrival received 325 aspirin, 600 plavix, and no heparin drip started. Around 1:30 am patient had multiple episodes of non-bloody diarrhea and emesis with associated abdominal pain and chest pain, unable to localize, non-radiating, with associated SOB and no associated palpitations or diaphoresis. No recent fevers/chills, cough, rashes, or changes in urination. Does report mild diffuse back pain; started 5 days ago in setting on injury while walking and lifting objects. Denies focal weakness, numbness/tingling, or LOC due does report mild dizziness.    Cardiac arrest s/p VA ECMO 11/25  Hemodynamic instability  Hypovolemia  Post op respiratory insufficiency  Thrombocytopenia  Acute blood loss anemia  Leukocytosis     Plan:  ====================== NEUROLOGY=====================  Patient sedated with IV Precedex, continue to monitor neuro status     dexMEDEtomidine Infusion 1.5 MICROgram(s)/kG/Hr (34.8 mL/Hr) IV Continuous <Continuous>  ==================== RESPIRATORY======================  Respiratory status required full ventilatory support, close monitoring of respiratory rate and breathing pattern, the following of ABG’s with A-line monitoring, continuous pulse oximetry monitoring   Ophelia - 20ppm     Mechanical Ventilation:  Mode: AC/ CMV (Assist Control/ Continuous Mandatory Ventilation)  RR (machine): 14  FiO2: 40  PEEP: 12  ITime: 1.4  MAP: 16  PC: 10  PIP: 22  ====================CARDIOVASCULAR==================  Invasive hemodynamic monitoring, assess perfusion indices   Cardiac arrest s/p VA ECMO 11/25  Lactate 1.2, continue trending   ECMO- 4000 rpms, 4.58 flow    ===================HEMATOLOGIC/ONC ===================  Thrombocytopenia and acute blood loss anemia, AC therapy w/ heparin gtt    heparin  Infusion 500 Unit(s)/Hr (11 mL/Hr) IV Continuous <Continuous>  ===================== RENAL =========================  Continue to monitor I/Os, BUN/Creatinine.   Replete lytes PRN  De Dios present  ==================== GASTROINTESTINAL===================  TF Vital AF @10cc/hr  Bowel regimen with Miralax and Senna    GI prophylaxis, pantoprazole  Injectable 40 milliGRAM(s) IV Push two times a day  polyethylene glycol 3350 17 Gram(s) Oral daily  potassium chloride  10 mEq/50 mL IVPB 10 milliEquivalent(s) IV Intermittent every 1 hour  senna 2 Tablet(s) Oral at bedtime  sodium chloride 0.9% lock flush 10 milliLiter(s) IV Push every 1 hour PRN Pre/post blood products, medications, blood draw, and to maintain line patency  sodium chloride 0.9%. 1000 milliLiter(s) (10 mL/Hr) IV Continuous <Continuous>  =======================    ENDOCRINE  =====================  Hx of DM2, continue glycemic control with insulin gtt and ISS    hydrocortisone sodium succinate Injectable 25 milliGRAM(s) IV Push every 8 hours  insulin lispro (ADMELOG) corrective regimen sliding scale   SubCutaneous every 6 hours  insulin regular Infusion 6 Unit(s)/Hr (6 mL/Hr) IV Continuous <Continuous>  ========================INFECTIOUS DISEASE================  Meropenem by level for empiric coverage  Follow up cultures    meropenem  IVPB 1000 milliGRAM(s) IV Intermittent every 12 hours        Patient requires continuous monitoring with bedside rhythm monitoring, pulse ox monitoring, and intermittent blood gas analysis. Care plan discussed with ICU care team. Patient remained critical and at risk for life threatening decompensation.    By signing my name below, I, Rosio Alonzo, attest that this documentation has been prepared under the direction and in the presence of Noman Pryor MD   Electronically signed: Elizabeth Burnettibe    Noman MANN, personally performed the services described in this documentation. all medical record entries made by the scribe were at my direction and in my presence. I have reviewed the chart and agree that the record reflects my personal performance and is accurate and complete  Electronically signed: Noman Pryor MD 11-28-22 @ 09:09       EDUARDO REAL  MRN-[id number]  Patient is a 62y old  Male who presents with a chief complaint of Acute cholecystitis, gallstone pancreatitis (28 Nov 2022 08:52)    HPI:  Patient is a 63 y/o male with PMH of CABG, DM, HTN, HLD presenting as transfer from [place] for c/f STEMI. PTA arrival received 325 aspirin, 600 plavix, and no heparin drip started. Around 1:30 am patient had multiple episodes of non-bloody diarrhea and emesis with associated abdominal pain and chest pain, unable to localize, non-radiating, with associated SOB and no associated palpitations or diaphoresis. No recent fevers/chills, cough, rashes, or changes in urination. Does report mild diffuse back pain; started 5 days ago in setting on injury while walking and lifting objects. Denies focal weakness, numbness/tingling, or LOC due does report mild dizziness.    Patient seen and examined in ED. Hemodynamically stable, alert and awake, A&Ox3. Daughter at bedside. Reports abdominal pain, and nausea. Abdomen is soft, tender in epigastric area and RUQ. Denies chest pain, SOB. WBC 20, T.bili 3.4, Lipase 300. RUQ US demonstrated gallbladder stones, pericholecystic fluid.  (24 Nov 2022 15:10)    REVIEW OF SYSTEMS:  Unable to obtain due to patient being intubated    Vital Signs Last 24 Hrs  T(C): 37 (28 Nov 2022 08:00), Max: 37 (27 Nov 2022 16:00)  T(F): 98.6 (28 Nov 2022 08:00), Max: 98.6 (27 Nov 2022 16:00)  HR: 75 (28 Nov 2022 09:00) (64 - 80)  BP: --  BP(mean): --  RR: 14 (28 Nov 2022 09:00) (10 - 21)  SpO2: 100% (28 Nov 2022 09:00) (100% - 100%)    Parameters below as of 28 Nov 2022 04:00  Patient On (Oxygen Delivery Method): ventilator    O2 Concentration (%): 40    ============================I/O===========================   I&O's Detail    27 Nov 2022 07:01  -  28 Nov 2022 07:00  --------------------------------------------------------  IN:    Albumin 5% - 50 mL: 100 mL    Dexmedetomidine: 754.5 mL    Free Water: 750 mL    Heparin: 180.5 mL    Insulin: 56.5 mL    IV PiggyBack: 100 mL    Propofol: 38.5 mL    sodium chloride 0.9%: 240 mL    Vital1.5: 110 mL  Total IN: 2330 mL    OUT:    Indwelling Catheter - Urethral (mL): 2810 mL    Vasopressin: 0 mL  Total OUT: 2810 mL    Total NET: -480 mL      28 Nov 2022 07:01  -  28 Nov 2022 09:09  --------------------------------------------------------  IN:    Dexmedetomidine: 23.2 mL    Heparin: 10 mL    Insulin: 2.5 mL    IV PiggyBack: 50 mL    sodium chloride 0.9%: 10 mL    Vital1.5: 10 mL  Total IN: 105.7 mL    OUT:    Indwelling Catheter - Urethral (mL): 115 mL  Total OUT: 115 mL    Total NET: -9.3 mL  ============================ LABS =========================                        8.5    13.88 )-----------( 102      ( 28 Nov 2022 00:57 )             26.0     11-28    152<H>  |  112<H>  |  63<H>  ----------------------------<  157<H>  4.0   |  25  |  3.95<H>    Ca    8.7      28 Nov 2022 00:58  Phos  6.5     11-28  Mg     2.4     11-28    TPro  6.1  /  Alb  3.2<L>  /  TBili  0.8  /  DBili  x   /  AST  228<H>  /  ALT  72<H>  /  AlkPhos  63  11-28    LIVER FUNCTIONS - ( 28 Nov 2022 00:58 )  Alb: 3.2 g/dL / Pro: 6.1 g/dL / ALK PHOS: 63 U/L / ALT: 72 U/L / AST: 228 U/L / GGT: x           PT/INR - ( 28 Nov 2022 04:28 )   PT: 11.7 sec;   INR: 1.01 ratio         PTT - ( 28 Nov 2022 04:28 )  PTT:35.1 sec  ABG - ( 28 Nov 2022 08:55 )  pH, Arterial: 7.50  pH, Blood: x     /  pCO2: 32    /  pO2: 123   / HCO3: 25    / Base Excess: 1.9   /  SaO2: 97.3    ======================Microbiology/Radiology=================  Culture: Reviewed   CXR: Reviewed  ======================================================  PAST MEDICAL & SURGICAL HISTORY:    ====================ASSESSMENT ==============  63 y/o male with PMH of CABG, DM, HTN, HLD presenting as transfer from [place] for c/f STEMI. PTA arrival received 325 aspirin, 600 plavix, and no heparin drip started. Around 1:30 am patient had multiple episodes of non-bloody diarrhea and emesis with associated abdominal pain and chest pain, unable to localize, non-radiating, with associated SOB and no associated palpitations or diaphoresis. No recent fevers/chills, cough, rashes, or changes in urination. Does report mild diffuse back pain; started 5 days ago in setting on injury while walking and lifting objects. Denies focal weakness, numbness/tingling, or LOC due does report mild dizziness.    Cardiac arrest s/p VA ECMO 11/25  Hemodynamic instability  Hypovolemia  Post op respiratory insufficiency  Thrombocytopenia  Acute blood loss anemia  Leukocytosis     Plan:  ====================== NEUROLOGY=====================  Patient sedated with IV Precedex, continue to monitor neuro status     dexMEDEtomidine Infusion 1.5 MICROgram(s)/kG/Hr (34.8 mL/Hr) IV Continuous <Continuous>  ==================== RESPIRATORY======================  Respiratory status required full ventilatory support, close monitoring of respiratory rate and breathing pattern, the following of ABG’s with A-line monitoring, continuous pulse oximetry monitoring   Ophelia - 20ppm     Mechanical Ventilation:  Mode: AC/ CMV (Assist Control/ Continuous Mandatory Ventilation)  RR (machine): 14  FiO2: 40  PEEP: 12  ITime: 1.4  MAP: 16  PC: 10  PIP: 22  ====================CARDIOVASCULAR==================  Invasive hemodynamic monitoring, assess perfusion indices   Cardiac arrest s/p VA ECMO 11/25  Lactate 1.2, continue trending   ECMO- 4000 rpms, 4.58 flow    ===================HEMATOLOGIC/ONC ===================  Thrombocytopenia and acute blood loss anemia, AC therapy w/ heparin gtt    heparin  Infusion 500 Unit(s)/Hr (11 mL/Hr) IV Continuous <Continuous>  ===================== RENAL =========================  Continue to monitor I/Os, BUN/Creatinine.   Replete lytes PRN  De Dios present  ==================== GASTROINTESTINAL===================  TF Vital AF @10cc/hr  Bowel regimen with Miralax and Senna    GI prophylaxis, pantoprazole  Injectable 40 milliGRAM(s) IV Push two times a day  polyethylene glycol 3350 17 Gram(s) Oral daily  potassium chloride  10 mEq/50 mL IVPB 10 milliEquivalent(s) IV Intermittent every 1 hour  senna 2 Tablet(s) Oral at bedtime  sodium chloride 0.9% lock flush 10 milliLiter(s) IV Push every 1 hour PRN Pre/post blood products, medications, blood draw, and to maintain line patency  sodium chloride 0.9%. 1000 milliLiter(s) (10 mL/Hr) IV Continuous <Continuous>  =======================    ENDOCRINE  =====================  Hx of DM2, continue glycemic control with insulin gtt and ISS    hydrocortisone sodium succinate Injectable 25 milliGRAM(s) IV Push every 8 hours  insulin lispro (ADMELOG) corrective regimen sliding scale   SubCutaneous every 6 hours  insulin regular Infusion 6 Unit(s)/Hr (6 mL/Hr) IV Continuous <Continuous>  ========================INFECTIOUS DISEASE================  Meropenem by level for empiric coverage  Follow up cultures    meropenem  IVPB 1000 milliGRAM(s) IV Intermittent every 12 hours        Patient requires continuous monitoring with bedside rhythm monitoring, pulse ox monitoring, and intermittent blood gas analysis. Care plan discussed with ICU care team. Patient remained critical and at risk for life threatening decompensation.    By signing my name below, I, Rosio Alonzo, attest that this documentation has been prepared under the direction and in the presence of Noman Pryor MD   Electronically signed: Elizabeth Burnettibe    Noman MANN, personally performed the services described in this documentation. all medical record entries made by the scribe were at my direction and in my presence. I have reviewed the chart and agree that the record reflects my personal performance and is accurate and complete  Electronically signed: Noman Pryor MD 11-28-22 @ 09:09       EDUARDO REAL  MRN-[id number]  Patient is a 62y old  Male who presents with a chief complaint of Acute cholecystitis, gallstone pancreatitis (28 Nov 2022 08:52)    HPI:  Patient is a 63 y/o male with PMH of CABG, DM, HTN, HLD presenting as transfer from [place] for c/f STEMI. PTA arrival received 325 aspirin, 600 plavix, and no heparin drip started. Around 1:30 am patient had multiple episodes of non-bloody diarrhea and emesis with associated abdominal pain and chest pain, unable to localize, non-radiating, with associated SOB and no associated palpitations or diaphoresis. No recent fevers/chills, cough, rashes, or changes in urination. Does report mild diffuse back pain; started 5 days ago in setting on injury while walking and lifting objects. Denies focal weakness, numbness/tingling, or LOC due does report mild dizziness.    Patient seen and examined in ED. Hemodynamically stable, alert and awake, A&Ox3. Daughter at bedside. Reports abdominal pain, and nausea. Abdomen is soft, tender in epigastric area and RUQ. Denies chest pain, SOB. WBC 20, T.bili 3.4, Lipase 300. RUQ US demonstrated gallbladder stones, pericholecystic fluid.  (24 Nov 2022 15:10)    REVIEW OF SYSTEMS:  Unable to obtain due to patient being intubated    Vital Signs Last 24 Hrs  T(C): 37 (28 Nov 2022 08:00), Max: 37 (27 Nov 2022 16:00)  T(F): 98.6 (28 Nov 2022 08:00), Max: 98.6 (27 Nov 2022 16:00)  HR: 75 (28 Nov 2022 09:00) (64 - 80)  BP: --  BP(mean): --  RR: 14 (28 Nov 2022 09:00) (10 - 21)  SpO2: 100% (28 Nov 2022 09:00) (100% - 100%)    Parameters below as of 28 Nov 2022 04:00  Patient On (Oxygen Delivery Method): ventilator    O2 Concentration (%): 40    ============================I/O===========================   I&O's Detail    27 Nov 2022 07:01  -  28 Nov 2022 07:00  --------------------------------------------------------  IN:    Albumin 5% - 50 mL: 100 mL    Dexmedetomidine: 754.5 mL    Free Water: 750 mL    Heparin: 180.5 mL    Insulin: 56.5 mL    IV PiggyBack: 100 mL    Propofol: 38.5 mL    sodium chloride 0.9%: 240 mL    Vital1.5: 110 mL  Total IN: 2330 mL    OUT:    Indwelling Catheter - Urethral (mL): 2810 mL    Vasopressin: 0 mL  Total OUT: 2810 mL    Total NET: -480 mL      28 Nov 2022 07:01  -  28 Nov 2022 09:09  --------------------------------------------------------  IN:    Dexmedetomidine: 23.2 mL    Heparin: 10 mL    Insulin: 2.5 mL    IV PiggyBack: 50 mL    sodium chloride 0.9%: 10 mL    Vital1.5: 10 mL  Total IN: 105.7 mL    OUT:    Indwelling Catheter - Urethral (mL): 115 mL  Total OUT: 115 mL    Total NET: -9.3 mL  ============================ LABS =========================                        8.5    13.88 )-----------( 102      ( 28 Nov 2022 00:57 )             26.0     11-28    152<H>  |  112<H>  |  63<H>  ----------------------------<  157<H>  4.0   |  25  |  3.95<H>    Ca    8.7      28 Nov 2022 00:58  Phos  6.5     11-28  Mg     2.4     11-28    TPro  6.1  /  Alb  3.2<L>  /  TBili  0.8  /  DBili  x   /  AST  228<H>  /  ALT  72<H>  /  AlkPhos  63  11-28    LIVER FUNCTIONS - ( 28 Nov 2022 00:58 )  Alb: 3.2 g/dL / Pro: 6.1 g/dL / ALK PHOS: 63 U/L / ALT: 72 U/L / AST: 228 U/L / GGT: x           PT/INR - ( 28 Nov 2022 04:28 )   PT: 11.7 sec;   INR: 1.01 ratio         PTT - ( 28 Nov 2022 04:28 )  PTT:35.1 sec  ABG - ( 28 Nov 2022 08:55 )  pH, Arterial: 7.50  pH, Blood: x     /  pCO2: 32    /  pO2: 123   / HCO3: 25    / Base Excess: 1.9   /  SaO2: 97.3    ======================Microbiology/Radiology=================  Culture: Reviewed   CXR: Reviewed  ======================================================  PAST MEDICAL & SURGICAL HISTORY:    ====================ASSESSMENT ==============  63 y/o male with PMH of CABG, DM, HTN, HLD presenting as transfer from [place] for c/f STEMI. PTA arrival received 325 aspirin, 600 plavix, and no heparin drip started. Around 1:30 am patient had multiple episodes of non-bloody diarrhea and emesis with associated abdominal pain and chest pain, unable to localize, non-radiating, with associated SOB and no associated palpitations or diaphoresis. No recent fevers/chills, cough, rashes, or changes in urination. Does report mild diffuse back pain; started 5 days ago in setting on injury while walking and lifting objects. Denies focal weakness, numbness/tingling, or LOC due does report mild dizziness.    Cardiac arrest s/p VA ECMO 11/25  Hemodynamic instability  Hypovolemia  Post op respiratory insufficiency  Thrombocytopenia  Acute blood loss anemia  Leukocytosis     Plan:  ====================== NEUROLOGY=====================  Patient sedated with IV Precedex, continue to monitor neuro status     dexMEDEtomidine Infusion 1.5 MICROgram(s)/kG/Hr (34.8 mL/Hr) IV Continuous <Continuous>  ==================== RESPIRATORY======================  Respiratory status required full ventilatory support, close monitoring of respiratory rate and breathing pattern, the following of ABG’s with A-line monitoring, continuous pulse oximetry monitoring   Ophelia - 20ppm     Mechanical Ventilation:  Mode: AC/ CMV (Assist Control/ Continuous Mandatory Ventilation)  RR (machine): 14  FiO2: 40  PEEP: 12  ITime: 1.4  MAP: 16  PC: 10  PIP: 22  ====================CARDIOVASCULAR==================  Invasive hemodynamic monitoring, assess perfusion indices   Cardiac arrest s/p VA ECMO 11/25  Lactate 1.2, continue trending   ECMO- 4000 rpms, 4.58 flow    ===================HEMATOLOGIC/ONC ===================  Thrombocytopenia and acute blood loss anemia, AC therapy w/ heparin gtt    heparin  Infusion 500 Unit(s)/Hr (11 mL/Hr) IV Continuous <Continuous>  ===================== RENAL =========================  Continue to monitor I/Os, BUN/Creatinine.   Replete lytes PRN  De Dios present  ==================== GASTROINTESTINAL===================  TF Vital AF @10cc/hr  Bowel regimen with Miralax and Senna    GI prophylaxis, pantoprazole  Injectable 40 milliGRAM(s) IV Push two times a day  polyethylene glycol 3350 17 Gram(s) Oral daily  potassium chloride  10 mEq/50 mL IVPB 10 milliEquivalent(s) IV Intermittent every 1 hour  senna 2 Tablet(s) Oral at bedtime  sodium chloride 0.9% lock flush 10 milliLiter(s) IV Push every 1 hour PRN Pre/post blood products, medications, blood draw, and to maintain line patency  sodium chloride 0.9%. 1000 milliLiter(s) (10 mL/Hr) IV Continuous <Continuous>  =======================    ENDOCRINE  =====================  Hx of DM2, continue glycemic control with insulin gtt and ISS    hydrocortisone sodium succinate Injectable 25 milliGRAM(s) IV Push every 8 hours  insulin lispro (ADMELOG) corrective regimen sliding scale   SubCutaneous every 6 hours  insulin regular Infusion 6 Unit(s)/Hr (6 mL/Hr) IV Continuous <Continuous>  ========================INFECTIOUS DISEASE================  Meropenem by level for empiric coverage  Follow up cultures    meropenem  IVPB 1000 milliGRAM(s) IV Intermittent every 12 hours        Patient requires continuous monitoring with bedside rhythm monitoring, pulse ox monitoring, and intermittent blood gas analysis. Care plan discussed with ICU care team. Patient remained critical and at risk for life threatening decompensation.    By signing my name below, I, Rosio Alonzo, attest that this documentation has been prepared under the direction and in the presence of Noman Pryor MD   Electronically signed: Elizabeth Burnettibe    Noman MANN, personally performed the services described in this documentation. all medical record entries made by the scribe were at my direction and in my presence. I have reviewed the chart and agree that the record reflects my personal performance and is accurate and complete  Electronically signed: Noman Pryor MD 11-28-22 @ 09:09

## 2022-11-28 NOTE — CONSULT NOTE ADULT - PROBLEM SELECTOR RECOMMENDATION 3
- CXR showing some improvement  - maintain vent  - c/w ECMO , adjust sweep and FdO2 according to ABG - CXR showing some improvement  - c/w ECMO , adjust sweep and FdO2 according to ABG  - extubate when able

## 2022-11-28 NOTE — PROGRESS NOTE ADULT - ASSESSMENT
61 y/o male with PMH of CABG, DM, HTN, HLD presents with abdominal pain, diarrhea and n/v for 1 day. Found to have acute cholecystitis, gallstone pancreatitis. Now with resp failure intubated on ECMO    Plan:    - No surgical intervention plans   - intubated, on pressors  - ECMO  - hep gtt  - NPO/IVF  - IV abx  - Pain control PRN  - rest of care as per CCU  - Surgery will sign off. Re-consult as needed    ACS/Trauma  [id number] 63 y/o male with PMH of CABG, DM, HTN, HLD presents with abdominal pain, diarrhea and n/v for 1 day. Found to have acute cholecystitis, gallstone pancreatitis. Now with resp failure intubated on ECMO    Plan:    - No surgical intervention plans   - intubated, on pressors  - ECMO  - hep gtt  - NPO/IVF  - IV abx  - Pain control PRN  - rest of care as per CCU  - Surgery will sign off. Re-consult as needed    ACS/Trauma  [phone]

## 2022-11-28 NOTE — CONSULT NOTE ADULT - SUBJECTIVE AND OBJECTIVE BOX
Patient is a 62y old  Male who presents with a chief complaint of Acute cholecystitis, gallstone pancreatitis (28 Nov 2022 09:08)    HPI:  Patient is a 61 y/o male with PMH of CABG, DM, HTN, HLD presenting as transfer from [place] for c/f STEMI. PTA arrival received 325 aspirin, 600 plavix, and no heparin drip started. Around 1:30 am patient had multiple episodes of non-bloody diarrhea and emesis with associated abdominal pain and chest pain, unable to localize, non-radiating, with associated SOB and no associated palpitations or diaphoresis. No recent fevers/chills, cough, rashes, or changes in urination. Does report mild diffuse back pain; started 5 days ago in setting on injury while walking and lifting objects. Denies focal weakness, numbness/tingling, or LOC due does report mild dizziness.     l incontinence	              Allergic/Immunologic:	No hives or rash   Allergies    No Known Allergies    Intolerances        Antimicrobials:    meropenem  IVPB 500 milliGRAM(s) IV Intermittent every 12 hours        Vital Signs Last 24 Hrs  T(C): 37 (28 Nov 2022 08:00), Max: 37 (27 Nov 2022 16:00)  T(F): 98.6 (28 Nov 2022 08:00), Max: 98.6 (27 Nov 2022 16:00)  HR: 73 (28 Nov 2022 10:30) (64 - 80)  BP: --  BP(mean): --  RR: 14 (28 Nov 2022 10:30) (10 - 21)  SpO2: 100% (28 Nov 2022 10:30) (100% - 100%)    Parameters below as of 28 Nov 2022 04:00  Patient On (Oxygen Delivery Method): ventilator    O2 Concentration (%): 40    PHYSICAL EXAM:Pleasant patient in no acute distress.         No cachexia     Eyes:PERRL EOMI.NO discharge or conjunctival injection    ENMT:No sinus tenderness.No thrush.No pharyngeal exudate or erythema.Fair dental hygiene    Neck:Supple,No LN,no JVD      Respiratory:Good air entry bilaterally,CTA    Cardiovascular:S1 S2 wnl, No murmurs,rub or gallops    Gastrointestinal:Soft BS(+) no tenderness no masses ,No rebound or guarding    Genitourinary:No CVA tendereness     Rectal:    Extremities:No cyanosis,clubbing or edema.    Vascular:peripheral pulses felt                                               8.5    13.88 )-----------( 102      ( 28 Nov 2022 00:57 )             26.0         11-28    152<H>  |  112<H>  |  63<H>  ----------------------------<  157<H>  4.0   |  25  |  3.95<H>    Ca    8.7      28 Nov 2022 00:58  Phos  6.5     11-28  Mg     2.4     11-28    TPro  6.1  /  Alb  3.2<L>  /  TBili  0.8  /  DBili  x   /  AST  228<H>  /  ALT  72<H>  /  AlkPhos  63  11-28      RECENT CULTURES:  11-26 @ 18:11  ET Tube ET Tube  --  --  --    Normal Respiratory Christy absent  --  11-25 @ 22:55  ET Tube ET Tube  --  --  --    No growth at 48 hours  --  11-25 @ 22:10  .Blood Blood-Peripheral  --  --  --    No growth to date.  --  11-25 @ 21:15  .Blood Blood-Peripheral  --  --  --    No growth to date.  --  11-25 @ 10:39  .Blood Blood-Peripheral  --  --  --    No growth to date.  --  11-25 @ 10:37  .Blood Blood-Peripheral  --  --  --    No growth to date.  --      MICROBIOLOGY:  Culture Results:   Normal Respiratory Christy absent (11-26 @ 18:11)  Culture Results:   No growth at 48 hours (11-25 @ 22:55)  Culture Results:   No growth to date. (11-25 @ 22:10)  Culture Results:   No growth to date. (11-25 @ 21:15)  Culture Results:   No growth to date. (11-25 @ 10:39)  Culture Results:   No growth to date. (11-25 @ 10:37)          Radiology:      Assessment:        Recommendations and Plan:    Pager [phone]  After 5 pm/weekends or if no response :[phone]       Patient is a 62y old  Male who presents with a chief complaint of Acute cholecystitis, gallstone pancreatitis (28 Nov 2022 09:08)    HPI:  Patient is a 63 y/o male with PMH of CABG, DM, HTN, HLD presenting as transfer from [place] for c/f STEMI. PTA arrival received 325 aspirin, 600 plavix, and no heparin drip started. Around 1:30 am patient had multiple episodes of non-bloody diarrhea and emesis with associated abdominal pain and chest pain, unable to localize, non-radiating, with associated SOB and no associated palpitations or diaphoresis. No recent fevers/chills, cough, rashes, or changes in urination. Does report mild diffuse back pain; started 5 days ago in setting on injury while walking and lifting objects. Denies focal weakness, numbness/tingling, or LOC due does report mild dizziness.     l incontinence	              Allergic/Immunologic:	No hives or rash   Allergies    No Known Allergies    Intolerances        Antimicrobials:    meropenem  IVPB 500 milliGRAM(s) IV Intermittent every 12 hours        Vital Signs Last 24 Hrs  T(C): 37 (28 Nov 2022 08:00), Max: 37 (27 Nov 2022 16:00)  T(F): 98.6 (28 Nov 2022 08:00), Max: 98.6 (27 Nov 2022 16:00)  HR: 73 (28 Nov 2022 10:30) (64 - 80)  BP: --  BP(mean): --  RR: 14 (28 Nov 2022 10:30) (10 - 21)  SpO2: 100% (28 Nov 2022 10:30) (100% - 100%)    Parameters below as of 28 Nov 2022 04:00  Patient On (Oxygen Delivery Method): ventilator    O2 Concentration (%): 40    PHYSICAL EXAM:Pleasant patient in no acute distress.         No cachexia     Eyes:PERRL EOMI.NO discharge or conjunctival injection    ENMT:No sinus tenderness.No thrush.No pharyngeal exudate or erythema.Fair dental hygiene    Neck:Supple,No LN,no JVD      Respiratory:Good air entry bilaterally,CTA    Cardiovascular:S1 S2 wnl, No murmurs,rub or gallops    Gastrointestinal:Soft BS(+) no tenderness no masses ,No rebound or guarding    Genitourinary:No CVA tendereness     Rectal:    Extremities:No cyanosis,clubbing or edema.    Vascular:peripheral pulses felt                                               8.5    13.88 )-----------( 102      ( 28 Nov 2022 00:57 )             26.0         11-28    152<H>  |  112<H>  |  63<H>  ----------------------------<  157<H>  4.0   |  25  |  3.95<H>    Ca    8.7      28 Nov 2022 00:58  Phos  6.5     11-28  Mg     2.4     11-28    TPro  6.1  /  Alb  3.2<L>  /  TBili  0.8  /  DBili  x   /  AST  228<H>  /  ALT  72<H>  /  AlkPhos  63  11-28      RECENT CULTURES:  11-26 @ 18:11  ET Tube ET Tube  --  --  --    Normal Respiratory Christy absent  --  11-25 @ 22:55  ET Tube ET Tube  --  --  --    No growth at 48 hours  --  11-25 @ 22:10  .Blood Blood-Peripheral  --  --  --    No growth to date.  --  11-25 @ 21:15  .Blood Blood-Peripheral  --  --  --    No growth to date.  --  11-25 @ 10:39  .Blood Blood-Peripheral  --  --  --    No growth to date.  --  11-25 @ 10:37  .Blood Blood-Peripheral  --  --  --    No growth to date.  --      MICROBIOLOGY:  Culture Results:   Normal Respiratory Christy absent (11-26 @ 18:11)  Culture Results:   No growth at 48 hours (11-25 @ 22:55)  Culture Results:   No growth to date. (11-25 @ 22:10)  Culture Results:   No growth to date. (11-25 @ 21:15)  Culture Results:   No growth to date. (11-25 @ 10:39)  Culture Results:   No growth to date. (11-25 @ 10:37)          Radiology:      Assessment:        Recommendations and Plan:    Pager [phone]  After 5 pm/weekends or if no response :[phone]       Patient is a 62y old  Male who presents with a chief complaint of Acute cholecystitis, gallstone pancreatitis (28 Nov 2022 09:08)    HPI:  Patient is a 61 y/o male with PMH of CABG, DM, HTN, HLD presenting as transfer from [place] for c/f STEMI. PTA arrival received 325 aspirin, 600 plavix, and no heparin drip started. Around 1:30 am patient had multiple episodes of non-bloody diarrhea and emesis with associated abdominal pain and chest pain, unable to localize, non-radiating, with associated SOB and no associated palpitations or diaphoresis. No recent fevers/chills, cough, rashes, or changes in urination. Does report mild diffuse back pain; started 5 days ago in setting on injury while walking and lifting objects. Denies focal weakness, numbness/tingling, or LOC due does report mild dizziness.     l incontinence	              Allergic/Immunologic:	No hives or rash   Allergies    No Known Allergies    Intolerances        Antimicrobials:    meropenem  IVPB 500 milliGRAM(s) IV Intermittent every 12 hours        Vital Signs Last 24 Hrs  T(C): 37 (28 Nov 2022 08:00), Max: 37 (27 Nov 2022 16:00)  T(F): 98.6 (28 Nov 2022 08:00), Max: 98.6 (27 Nov 2022 16:00)  HR: 73 (28 Nov 2022 10:30) (64 - 80)  BP: --  BP(mean): --  RR: 14 (28 Nov 2022 10:30) (10 - 21)  SpO2: 100% (28 Nov 2022 10:30) (100% - 100%)    Parameters below as of 28 Nov 2022 04:00  Patient On (Oxygen Delivery Method): ventilator    O2 Concentration (%): 40    PHYSICAL EXAM:Pleasant patient in no acute distress.         No cachexia     Eyes:PERRL EOMI.NO discharge or conjunctival injection    ENMT:No sinus tenderness.No thrush.No pharyngeal exudate or erythema.Fair dental hygiene    Neck:Supple,No LN,no JVD      Respiratory:Good air entry bilaterally,CTA    Cardiovascular:S1 S2 wnl, No murmurs,rub or gallops    Gastrointestinal:Soft BS(+) no tenderness no masses ,No rebound or guarding    Genitourinary:No CVA tendereness     Rectal:    Extremities:No cyanosis,clubbing or edema.    Vascular:peripheral pulses felt                                               8.5    13.88 )-----------( 102      ( 28 Nov 2022 00:57 )             26.0         11-28    152<H>  |  112<H>  |  63<H>  ----------------------------<  157<H>  4.0   |  25  |  3.95<H>    Ca    8.7      28 Nov 2022 00:58  Phos  6.5     11-28  Mg     2.4     11-28    TPro  6.1  /  Alb  3.2<L>  /  TBili  0.8  /  DBili  x   /  AST  228<H>  /  ALT  72<H>  /  AlkPhos  63  11-28      RECENT CULTURES:  11-26 @ 18:11  ET Tube ET Tube  --  --  --    Normal Respiratory Christy absent  --  11-25 @ 22:55  ET Tube ET Tube  --  --  --    No growth at 48 hours  --  11-25 @ 22:10  .Blood Blood-Peripheral  --  --  --    No growth to date.  --  11-25 @ 21:15  .Blood Blood-Peripheral  --  --  --    No growth to date.  --  11-25 @ 10:39  .Blood Blood-Peripheral  --  --  --    No growth to date.  --  11-25 @ 10:37  .Blood Blood-Peripheral  --  --  --    No growth to date.  --      MICROBIOLOGY:  Culture Results:   Normal Respiratory Christy absent (11-26 @ 18:11)  Culture Results:   No growth at 48 hours (11-25 @ 22:55)  Culture Results:   No growth to date. (11-25 @ 22:10)  Culture Results:   No growth to date. (11-25 @ 21:15)  Culture Results:   No growth to date. (11-25 @ 10:39)  Culture Results:   No growth to date. (11-25 @ 10:37)          Radiology:      Assessment:        Recommendations and Plan:    Pager [phone]  After 5 pm/weekends or if no response :[phone]

## 2022-11-28 NOTE — CONSULT NOTE ADULT - PROBLEM SELECTOR RECOMMENDATION 5
- likely arrest associated ATN   - UOP is good, urinated 2.8L over last 24hr, Cr 3.9  - no indication for dialysis  - renal following
- home meds on hold for hypotension  - pt requiring pressor support at this time  - mgt per primary team

## 2022-11-28 NOTE — PROGRESS NOTE ADULT - SUBJECTIVE AND OBJECTIVE BOX
EDUARDO [id number]    ECMO SETTINGS:    Type:  [ ] Venovenous                      X Venoarterial    Pump Flow (Lpm):  3.82 (28 Nov 2022 23:00)   RPM:  3600 (28 Nov 2022 23:00)       Mode: AC/ CMV (Assist Control/ Continuous Mandatory Ventilation), RR (machine): 14, FiO2: 40, PEEP: 10, ITime: 1, MAP: 14, PC: 10, PIP: 20    Sweep  (L/min):   4 (28 Nov 2022 23:00)                            FiO2 (%):  1 (28 Nov 2022 23:00)    artificial  tears Solution 1 Drop(s) Both EYES four times a day  chlorhexidine 0.12% Liquid 15 milliLiter(s) Oral Mucosa every 12 hours  chlorhexidine 2% Cloths 1 Application(s) Topical <User Schedule>  chlorhexidine 4% Liquid 1 Application(s) Topical <User Schedule>  dexMEDEtomidine Infusion 1.5 MICROgram(s)/kG/Hr IV Continuous <Continuous>  dextrose 50% Injectable 50 milliLiter(s) IV Push every 15 minutes  heparin  Infusion 500 Unit(s)/Hr IV Continuous <Continuous>  hydrocortisone sodium succinate Injectable 25 milliGRAM(s) IV Push every 8 hours  insulin lispro (ADMELOG) corrective regimen sliding scale   SubCutaneous every 6 hours  insulin regular Infusion 6 Unit(s)/Hr IV Continuous <Continuous>  meropenem  IVPB 500 milliGRAM(s) IV Intermittent every 12 hours  pantoprazole  Injectable 40 milliGRAM(s) IV Push two times a day  polyethylene glycol 3350 17 Gram(s) Oral daily  senna 2 Tablet(s) Oral at bedtime  sodium chloride 0.9% lock flush 10 milliLiter(s) IV Push every 1 hour PRN  sodium chloride 0.9%. 1000 milliLiter(s) IV Continuous <Continuous>  vasopressin Infusion 0.027 Unit(s)/Min IV Continuous <Continuous>      Anticoagulation Labs:    ( 28 Nov 2022 20:03 )  PT: x      INR: x      aPTT: 40.5   ( 28 Nov 2022 13:04 )  PT: x      INR: x      aPTT: 40.6   ( 28 Nov 2022 04:28 )  PT: 11.7   INR: 1.01   aPTT: 35.1     Heparin Assay, Unfractionated, Anti-Xa: 0.17 IU/mL (28 Nov 2022 20:03)  Heparin Assay, Unfractionated, Anti-Xa: 0.21 IU/mL (28 Nov 2022 13:04)    The [facility] management was separate from the critical care billing time. EDUARDO [id number]    ECMO SETTINGS:    Type:  [ ] Venovenous                      X Venoarterial    Pump Flow (Lpm):  3.82 (28 Nov 2022 23:00)   RPM:  3600 (28 Nov 2022 23:00)       Mode: AC/ CMV (Assist Control/ Continuous Mandatory Ventilation), RR (machine): 14, FiO2: 40, PEEP: 10, ITime: 1, MAP: 14, PC: 10, PIP: 20    Sweep  (L/min):   4 (28 Nov 2022 23:00)                            FiO2 (%):  1 (28 Nov 2022 23:00)    artificial  tears Solution 1 Drop(s) Both EYES four times a day  chlorhexidine 0.12% Liquid 15 milliLiter(s) Oral Mucosa every 12 hours  chlorhexidine 2% Cloths 1 Application(s) Topical <User Schedule>  chlorhexidine 4% Liquid 1 Application(s) Topical <User Schedule>  dexMEDEtomidine Infusion 1.5 MICROgram(s)/kG/Hr IV Continuous <Continuous>  dextrose 50% Injectable 50 milliLiter(s) IV Push every 15 minutes  heparin  Infusion 500 Unit(s)/Hr IV Continuous <Continuous>  hydrocortisone sodium succinate Injectable 25 milliGRAM(s) IV Push every 8 hours  insulin lispro (ADMELOG) corrective regimen sliding scale   SubCutaneous every 6 hours  insulin regular Infusion 6 Unit(s)/Hr IV Continuous <Continuous>  meropenem  IVPB 500 milliGRAM(s) IV Intermittent every 12 hours  pantoprazole  Injectable 40 milliGRAM(s) IV Push two times a day  polyethylene glycol 3350 17 Gram(s) Oral daily  senna 2 Tablet(s) Oral at bedtime  sodium chloride 0.9% lock flush 10 milliLiter(s) IV Push every 1 hour PRN  sodium chloride 0.9%. 1000 milliLiter(s) IV Continuous <Continuous>  vasopressin Infusion 0.027 Unit(s)/Min IV Continuous <Continuous>      Anticoagulation Labs:    ( 28 Nov 2022 20:03 )  PT: x      INR: x      aPTT: 40.5   ( 28 Nov 2022 13:04 )  PT: x      INR: x      aPTT: 40.6   ( 28 Nov 2022 04:28 )  PT: 11.7   INR: 1.01   aPTT: 35.1     Heparin Assay, Unfractionated, Anti-Xa: 0.17 IU/mL (28 Nov 2022 20:03)  Heparin Assay, Unfractionated, Anti-Xa: 0.21 IU/mL (28 Nov 2022 13:04)    The [facility] management was separate from the critical care billing time. EDUARDO [id number]    ECMO SETTINGS:    Type:  [ ] Venovenous                      X Venoarterial    Pump Flow (Lpm):  3.82 (28 Nov 2022 23:00)   RPM:  3600 (28 Nov 2022 23:00)       Mode: AC/ CMV (Assist Control/ Continuous Mandatory Ventilation), RR (machine): 14, FiO2: 40, PEEP: 10, ITime: 1, MAP: 14, PC: 10, PIP: 20    Sweep  (L/min):   4 (28 Nov 2022 23:00)                            FiO2 (%):  1 (28 Nov 2022 23:00)    artificial  tears Solution 1 Drop(s) Both EYES four times a day  chlorhexidine 0.12% Liquid 15 milliLiter(s) Oral Mucosa every 12 hours  chlorhexidine 2% Cloths 1 Application(s) Topical <User Schedule>  chlorhexidine 4% Liquid 1 Application(s) Topical <User Schedule>  dexMEDEtomidine Infusion 1.5 MICROgram(s)/kG/Hr IV Continuous <Continuous>  dextrose 50% Injectable 50 milliLiter(s) IV Push every 15 minutes  heparin  Infusion 500 Unit(s)/Hr IV Continuous <Continuous>  hydrocortisone sodium succinate Injectable 25 milliGRAM(s) IV Push every 8 hours  insulin lispro (ADMELOG) corrective regimen sliding scale   SubCutaneous every 6 hours  insulin regular Infusion 6 Unit(s)/Hr IV Continuous <Continuous>  meropenem  IVPB 500 milliGRAM(s) IV Intermittent every 12 hours  pantoprazole  Injectable 40 milliGRAM(s) IV Push two times a day  polyethylene glycol 3350 17 Gram(s) Oral daily  senna 2 Tablet(s) Oral at bedtime  sodium chloride 0.9% lock flush 10 milliLiter(s) IV Push every 1 hour PRN  sodium chloride 0.9%. 1000 milliLiter(s) IV Continuous <Continuous>  vasopressin Infusion 0.027 Unit(s)/Min IV Continuous <Continuous>      Anticoagulation Labs:    ( 28 Nov 2022 20:03 )  PT: x      INR: x      aPTT: 40.5   ( 28 Nov 2022 13:04 )  PT: x      INR: x      aPTT: 40.6   ( 28 Nov 2022 04:28 )  PT: 11.7   INR: 1.01   aPTT: 35.1     Heparin Assay, Unfractionated, Anti-Xa: 0.17 IU/mL (28 Nov 2022 20:03)  Heparin Assay, Unfractionated, Anti-Xa: 0.21 IU/mL (28 Nov 2022 13:04)    The [facility] management was separate from the critical care billing time.

## 2022-11-28 NOTE — CONSULT NOTE ADULT - SUBJECTIVE AND OBJECTIVE BOX
[facility] DIVISION OF KIDNEY DISEASES AND HYPERTENSION -- [phone]  -- INITIAL CONSULT NOTE  --------------------------------------------------------------------------------  HPI:  63 y/o male with PMH of CABG, DM, HTN, HLD presenting as transfer from [place] for c/f STEMI. PTA arrival received 325 aspirin, 600 plavix, and no heparin drip started. Around 1:30 am patient had multiple episodes of non-bloody diarrhea and emesis with associated abdominal pain and chest pain, unable to localize, non-radiating, with associated SOB and no associated palpitations or diaphoresis. No recent fevers/chills, cough, rashes, or changes in urination. Does report mild diffuse back pain; started 5 days ago in setting on injury while walking and lifting objects. Denies focal weakness, numbness/tingling, or LOC due does report mild dizziness.          Patient seen & examined. Denies chest pain, fever, chills, increased frequency, dysuria, hematuria, pus in urine, frothy urine, SOB, leg edema, loss of appetite, pruritis, N/V.      PAST HISTORY  --------------------------------------------------------------------------------  PAST MEDICAL & SURGICAL HISTORY:    FAMILY HISTORY:    PAST SOCIAL HISTORY:    ALLERGIES & MEDICATIONS  --------------------------------------------------------------------------------  Allergies    No Known Allergies    Intolerances      Standing Inpatient Medications  artificial  tears Solution 1 Drop(s) Both EYES four times a day  chlorhexidine 0.12% Liquid 15 milliLiter(s) Oral Mucosa every 12 hours  chlorhexidine 2% Cloths 1 Application(s) Topical <User Schedule>  chlorhexidine 4% Liquid 1 Application(s) Topical <User Schedule>  dexMEDEtomidine Infusion 1.5 MICROgram(s)/kG/Hr IV Continuous <Continuous>  dextrose 50% Injectable 50 milliLiter(s) IV Push every 15 minutes  heparin  Infusion 500 Unit(s)/Hr IV Continuous <Continuous>  hydrocortisone sodium succinate Injectable 25 milliGRAM(s) IV Push every 8 hours  insulin lispro (ADMELOG) corrective regimen sliding scale   SubCutaneous every 6 hours  insulin regular Infusion 6 Unit(s)/Hr IV Continuous <Continuous>  meropenem  IVPB 500 milliGRAM(s) IV Intermittent every 12 hours  pantoprazole  Injectable 40 milliGRAM(s) IV Push two times a day  polyethylene glycol 3350 17 Gram(s) Oral daily  senna 2 Tablet(s) Oral at bedtime  sodium chloride 0.9%. 1000 milliLiter(s) IV Continuous <Continuous>  vasopressin Infusion 0.027 Unit(s)/Min IV Continuous <Continuous>    PRN Inpatient Medications  sodium chloride 0.9% lock flush 10 milliLiter(s) IV Push every 1 hour PRN      REVIEW OF SYSTEMS  --------------------------------------------------------------------------------  Gen: No chills  Respiratory: No dyspnea, cough  CV: No chest pain  GI: No abdominal pain, diarrhea,  nausea, vomiting  : No increased frequency, dysuria, hematuria  MSK:  no edema  Neuro: No dizziness/lightheadedness    All other systems were reviewed and are negative, except as noted.    VITALS/PHYSICAL EXAM  --------------------------------------------------------------------------------  T(C): 37 (11-28-22 @ 12:00), Max: 37 (11-27-22 @ 16:00)  HR: 72 (11-28-22 @ 13:00) (64 - 80)  BP: --  RR: 14 (11-28-22 @ 13:00) (10 - 21)  SpO2: 100% (11-28-22 @ 13:00) (100% - 100%)  Wt(kg): --        11-27-22 @ 07:01  -  11-28-22 @ 07:00  --------------------------------------------------------  IN: 2330 mL / OUT: 2810 mL / NET: -480 mL    11-28-22 @ 07:01  -  11-28-22 @ 13:25  --------------------------------------------------------  IN: 676.7 mL / OUT: 625 mL / NET: 51.7 mL      Physical Exam:  	Gen: elderly, NAD  	HEENT: Anicteric, MMM, no JVD  	Pulm: CTA B/L  	CV: S1S2, no rub  	Abd: Soft, +BS, NT, ND           No presacral edema  	Ext: No LE edema B/L, no asterixis  	Neuro: Awake, alert  	Skin: Warm and dry  	Vascular access:    LABS/STUDIES  --------------------------------------------------------------------------------              8.5    13.88 >-----------<  102      [11-28-22 @ 00:57]              26.0     152  |  112  |  63  ----------------------------<  157      [11-28-22 @ 00:58]  4.0   |  25  |  3.95        Ca     8.7     [11-28-22 @ 00:58]      Mg     2.4     [11-28-22 @ 00:58]      Phos  6.5     [11-28-22 @ 00:58]    TPro  6.1  /  Alb  3.2  /  TBili  0.8  /  DBili  x   /  AST  228  /  ALT  72  /  AlkPhos  63  [11-28-22 @ 00:58]    PT/INR: PT 11.7 , INR 1.01       [11-28-22 @ 04:28]  PTT: 40.6       [11-28-22 @ 13:04]          [11-28-22 @ 00:58]        [11-28-22 @ 00:58]    Creatinine Trend:  SCr 3.95 [11-28 @ 00:58]  SCr 3.81 [11-27 @ 00:33]  SCr 2.83 [11-26 @ 01:53]  SCr 2.53 [11-25 @ 20:05]  SCr 2.38 [11-25 @ 18:27]    Urinalysis - [11-25-22 @ 10:37]      Color Yellow / Appearance Slightly Turbid / SG 1.027 / pH 6.0      Gluc Negative / Ketone Trace  / Bili Negative / Urobili Negative       Blood Moderate / Protein 30 mg/dL / Leuk Est Negative / Nitrite Negative      RBC 7 / WBC 7 / Hyaline 4 / Gran  / Sq Epi  / Non Sq Epi 2 / Bacteria Negative      TSH 0.60      [11-26-22 @ 05:24]  Lipid: chol --, , HDL --, LDL --      [11-26-22 @ 20:11]       [facility] DIVISION OF KIDNEY DISEASES AND HYPERTENSION -- [phone]  -- INITIAL CONSULT NOTE  --------------------------------------------------------------------------------  HPI:  63 y/o male with PMH of CABG, DM, HTN, HLD presenting as transfer from [place] for c/f STEMI. PTA arrival received 325 aspirin, 600 plavix, and no heparin drip started. Around 1:30 am patient had multiple episodes of non-bloody diarrhea and emesis with associated abdominal pain and chest pain, unable to localize, non-radiating, with associated SOB and no associated palpitations or diaphoresis. No recent fevers/chills, cough, rashes, or changes in urination. Does report mild diffuse back pain; started 5 days ago in setting on injury while walking and lifting objects. Denies focal weakness, numbness/tingling, or LOC due does report mild dizziness.          Patient seen & examined. Denies chest pain, fever, chills, increased frequency, dysuria, hematuria, pus in urine, frothy urine, SOB, leg edema, loss of appetite, pruritis, N/V.      PAST HISTORY  --------------------------------------------------------------------------------  PAST MEDICAL & SURGICAL HISTORY:    FAMILY HISTORY:    PAST SOCIAL HISTORY:    ALLERGIES & MEDICATIONS  --------------------------------------------------------------------------------  Allergies    No Known Allergies    Intolerances      Standing Inpatient Medications  artificial  tears Solution 1 Drop(s) Both EYES four times a day  chlorhexidine 0.12% Liquid 15 milliLiter(s) Oral Mucosa every 12 hours  chlorhexidine 2% Cloths 1 Application(s) Topical <User Schedule>  chlorhexidine 4% Liquid 1 Application(s) Topical <User Schedule>  dexMEDEtomidine Infusion 1.5 MICROgram(s)/kG/Hr IV Continuous <Continuous>  dextrose 50% Injectable 50 milliLiter(s) IV Push every 15 minutes  heparin  Infusion 500 Unit(s)/Hr IV Continuous <Continuous>  hydrocortisone sodium succinate Injectable 25 milliGRAM(s) IV Push every 8 hours  insulin lispro (ADMELOG) corrective regimen sliding scale   SubCutaneous every 6 hours  insulin regular Infusion 6 Unit(s)/Hr IV Continuous <Continuous>  meropenem  IVPB 500 milliGRAM(s) IV Intermittent every 12 hours  pantoprazole  Injectable 40 milliGRAM(s) IV Push two times a day  polyethylene glycol 3350 17 Gram(s) Oral daily  senna 2 Tablet(s) Oral at bedtime  sodium chloride 0.9%. 1000 milliLiter(s) IV Continuous <Continuous>  vasopressin Infusion 0.027 Unit(s)/Min IV Continuous <Continuous>    PRN Inpatient Medications  sodium chloride 0.9% lock flush 10 milliLiter(s) IV Push every 1 hour PRN      REVIEW OF SYSTEMS  --------------------------------------------------------------------------------  Gen: No chills  Respiratory: No dyspnea, cough  CV: No chest pain  GI: No abdominal pain, diarrhea,  nausea, vomiting  : No increased frequency, dysuria, hematuria  MSK:  no edema  Neuro: No dizziness/lightheadedness    All other systems were reviewed and are negative, except as noted.    VITALS/PHYSICAL EXAM  --------------------------------------------------------------------------------  T(C): 37 (11-28-22 @ 12:00), Max: 37 (11-27-22 @ 16:00)  HR: 72 (11-28-22 @ 13:00) (64 - 80)  BP: --  RR: 14 (11-28-22 @ 13:00) (10 - 21)  SpO2: 100% (11-28-22 @ 13:00) (100% - 100%)  Wt(kg): --        11-27-22 @ 07:01  -  11-28-22 @ 07:00  --------------------------------------------------------  IN: 2330 mL / OUT: 2810 mL / NET: -480 mL    11-28-22 @ 07:01  -  11-28-22 @ 13:25  --------------------------------------------------------  IN: 676.7 mL / OUT: 625 mL / NET: 51.7 mL      Physical Exam:  	Gen: elderly, NAD  	HEENT: Anicteric, MMM, no JVD  	Pulm: CTA B/L  	CV: S1S2, no rub  	Abd: Soft, +BS, NT, ND           No presacral edema  	Ext: No LE edema B/L, no asterixis  	Neuro: Awake, alert  	Skin: Warm and dry  	Vascular access:    LABS/STUDIES  --------------------------------------------------------------------------------              8.5    13.88 >-----------<  102      [11-28-22 @ 00:57]              26.0     152  |  112  |  63  ----------------------------<  157      [11-28-22 @ 00:58]  4.0   |  25  |  3.95        Ca     8.7     [11-28-22 @ 00:58]      Mg     2.4     [11-28-22 @ 00:58]      Phos  6.5     [11-28-22 @ 00:58]    TPro  6.1  /  Alb  3.2  /  TBili  0.8  /  DBili  x   /  AST  228  /  ALT  72  /  AlkPhos  63  [11-28-22 @ 00:58]    PT/INR: PT 11.7 , INR 1.01       [11-28-22 @ 04:28]  PTT: 40.6       [11-28-22 @ 13:04]          [11-28-22 @ 00:58]        [11-28-22 @ 00:58]    Creatinine Trend:  SCr 3.95 [11-28 @ 00:58]  SCr 3.81 [11-27 @ 00:33]  SCr 2.83 [11-26 @ 01:53]  SCr 2.53 [11-25 @ 20:05]  SCr 2.38 [11-25 @ 18:27]    Urinalysis - [11-25-22 @ 10:37]      Color Yellow / Appearance Slightly Turbid / SG 1.027 / pH 6.0      Gluc Negative / Ketone Trace  / Bili Negative / Urobili Negative       Blood Moderate / Protein 30 mg/dL / Leuk Est Negative / Nitrite Negative      RBC 7 / WBC 7 / Hyaline 4 / Gran  / Sq Epi  / Non Sq Epi 2 / Bacteria Negative      TSH 0.60      [11-26-22 @ 05:24]  Lipid: chol --, , HDL --, LDL --      [11-26-22 @ 20:11]       [facility] DIVISION OF KIDNEY DISEASES AND HYPERTENSION -- [phone]  -- INITIAL CONSULT NOTE  --------------------------------------------------------------------------------  HPI:  63 y/o male with PMH of CABG, DM, HTN, HLD presenting as transfer from [place] for c/f STEMI. PTA arrival received 325 aspirin, 600 plavix, and no heparin drip started. Around 1:30 am patient had multiple episodes of non-bloody diarrhea and emesis with associated abdominal pain and chest pain, unable to localize, non-radiating, with associated SOB and no associated palpitations or diaphoresis. No recent fevers/chills, cough, rashes, or changes in urination. Does report mild diffuse back pain; started 5 days ago in setting on injury while walking and lifting objects. Denies focal weakness, numbness/tingling, or LOC due does report mild dizziness.          Patient seen & examined. Denies chest pain, fever, chills, increased frequency, dysuria, hematuria, pus in urine, frothy urine, SOB, leg edema, loss of appetite, pruritis, N/V.      PAST HISTORY  --------------------------------------------------------------------------------  PAST MEDICAL & SURGICAL HISTORY:    FAMILY HISTORY:    PAST SOCIAL HISTORY:    ALLERGIES & MEDICATIONS  --------------------------------------------------------------------------------  Allergies    No Known Allergies    Intolerances      Standing Inpatient Medications  artificial  tears Solution 1 Drop(s) Both EYES four times a day  chlorhexidine 0.12% Liquid 15 milliLiter(s) Oral Mucosa every 12 hours  chlorhexidine 2% Cloths 1 Application(s) Topical <User Schedule>  chlorhexidine 4% Liquid 1 Application(s) Topical <User Schedule>  dexMEDEtomidine Infusion 1.5 MICROgram(s)/kG/Hr IV Continuous <Continuous>  dextrose 50% Injectable 50 milliLiter(s) IV Push every 15 minutes  heparin  Infusion 500 Unit(s)/Hr IV Continuous <Continuous>  hydrocortisone sodium succinate Injectable 25 milliGRAM(s) IV Push every 8 hours  insulin lispro (ADMELOG) corrective regimen sliding scale   SubCutaneous every 6 hours  insulin regular Infusion 6 Unit(s)/Hr IV Continuous <Continuous>  meropenem  IVPB 500 milliGRAM(s) IV Intermittent every 12 hours  pantoprazole  Injectable 40 milliGRAM(s) IV Push two times a day  polyethylene glycol 3350 17 Gram(s) Oral daily  senna 2 Tablet(s) Oral at bedtime  sodium chloride 0.9%. 1000 milliLiter(s) IV Continuous <Continuous>  vasopressin Infusion 0.027 Unit(s)/Min IV Continuous <Continuous>    PRN Inpatient Medications  sodium chloride 0.9% lock flush 10 milliLiter(s) IV Push every 1 hour PRN      REVIEW OF SYSTEMS  --------------------------------------------------------------------------------  Gen: No chills  Respiratory: No dyspnea, cough  CV: No chest pain  GI: No abdominal pain, diarrhea,  nausea, vomiting  : No increased frequency, dysuria, hematuria  MSK:  no edema  Neuro: No dizziness/lightheadedness    All other systems were reviewed and are negative, except as noted.    VITALS/PHYSICAL EXAM  --------------------------------------------------------------------------------  T(C): 37 (11-28-22 @ 12:00), Max: 37 (11-27-22 @ 16:00)  HR: 72 (11-28-22 @ 13:00) (64 - 80)  BP: --  RR: 14 (11-28-22 @ 13:00) (10 - 21)  SpO2: 100% (11-28-22 @ 13:00) (100% - 100%)  Wt(kg): --        11-27-22 @ 07:01  -  11-28-22 @ 07:00  --------------------------------------------------------  IN: 2330 mL / OUT: 2810 mL / NET: -480 mL    11-28-22 @ 07:01  -  11-28-22 @ 13:25  --------------------------------------------------------  IN: 676.7 mL / OUT: 625 mL / NET: 51.7 mL      Physical Exam:  	Gen: elderly, NAD  	HEENT: Anicteric, MMM, no JVD  	Pulm: CTA B/L  	CV: S1S2, no rub  	Abd: Soft, +BS, NT, ND           No presacral edema  	Ext: No LE edema B/L, no asterixis  	Neuro: Awake, alert  	Skin: Warm and dry  	Vascular access:    LABS/STUDIES  --------------------------------------------------------------------------------              8.5    13.88 >-----------<  102      [11-28-22 @ 00:57]              26.0     152  |  112  |  63  ----------------------------<  157      [11-28-22 @ 00:58]  4.0   |  25  |  3.95        Ca     8.7     [11-28-22 @ 00:58]      Mg     2.4     [11-28-22 @ 00:58]      Phos  6.5     [11-28-22 @ 00:58]    TPro  6.1  /  Alb  3.2  /  TBili  0.8  /  DBili  x   /  AST  228  /  ALT  72  /  AlkPhos  63  [11-28-22 @ 00:58]    PT/INR: PT 11.7 , INR 1.01       [11-28-22 @ 04:28]  PTT: 40.6       [11-28-22 @ 13:04]          [11-28-22 @ 00:58]        [11-28-22 @ 00:58]    Creatinine Trend:  SCr 3.95 [11-28 @ 00:58]  SCr 3.81 [11-27 @ 00:33]  SCr 2.83 [11-26 @ 01:53]  SCr 2.53 [11-25 @ 20:05]  SCr 2.38 [11-25 @ 18:27]    Urinalysis - [11-25-22 @ 10:37]      Color Yellow / Appearance Slightly Turbid / SG 1.027 / pH 6.0      Gluc Negative / Ketone Trace  / Bili Negative / Urobili Negative       Blood Moderate / Protein 30 mg/dL / Leuk Est Negative / Nitrite Negative      RBC 7 / WBC 7 / Hyaline 4 / Gran  / Sq Epi  / Non Sq Epi 2 / Bacteria Negative      TSH 0.60      [11-26-22 @ 05:24]  Lipid: chol --, , HDL --, LDL --      [11-26-22 @ 20:11]       [facility] DIVISION OF KIDNEY DISEASES AND HYPERTENSION -- [phone]  -- INITIAL CONSULT NOTE  --------------------------------------------------------------------------------  HPI:  61 y/o male with PMH of CABG, DM, HTN, HLD presenting as transfer from [place] for c/f STEMI. Course c/b gallstone pancreatitis leading to ARDS and shock & cardiac arrest now on [facility]. Had significant troponin elevation and his LVEF down to 8%. Pt was deemed to be too unstable to have an angiogram and potential PCI due to his co-morbidities & a new subdural bleed. Nephrology called for ERIC. SCr on arrival was 2.15; trended down to hector 1.6 on 11/25; now trending backup to 3.95 today. Pt is non oliguric with UOP 2.8L in 24hrs with 400cc net negative. Last lasix dose was 2 days ago.             Patient seen & examined. Currently on [facility]. CVP 9. On vasopressin       PAST HISTORY  --------------------------------------------------------------------------------  PAST MEDICAL & SURGICAL HISTORY:    FAMILY HISTORY:    PAST SOCIAL HISTORY:    ALLERGIES & MEDICATIONS  --------------------------------------------------------------------------------  Allergies    No Known Allergies    Intolerances      Standing Inpatient Medications  artificial  tears Solution 1 Drop(s) Both EYES four times a day  chlorhexidine 0.12% Liquid 15 milliLiter(s) Oral Mucosa every 12 hours  chlorhexidine 2% Cloths 1 Application(s) Topical <User Schedule>  chlorhexidine 4% Liquid 1 Application(s) Topical <User Schedule>  dexMEDEtomidine Infusion 1.5 MICROgram(s)/kG/Hr IV Continuous <Continuous>  dextrose 50% Injectable 50 milliLiter(s) IV Push every 15 minutes  heparin  Infusion 500 Unit(s)/Hr IV Continuous <Continuous>  hydrocortisone sodium succinate Injectable 25 milliGRAM(s) IV Push every 8 hours  insulin lispro (ADMELOG) corrective regimen sliding scale   SubCutaneous every 6 hours  insulin regular Infusion 6 Unit(s)/Hr IV Continuous <Continuous>  meropenem  IVPB 500 milliGRAM(s) IV Intermittent every 12 hours  pantoprazole  Injectable 40 milliGRAM(s) IV Push two times a day  polyethylene glycol 3350 17 Gram(s) Oral daily  senna 2 Tablet(s) Oral at bedtime  sodium chloride 0.9%. 1000 milliLiter(s) IV Continuous <Continuous>  vasopressin Infusion 0.027 Unit(s)/Min IV Continuous <Continuous>    PRN Inpatient Medications  sodium chloride 0.9% lock flush 10 milliLiter(s) IV Push every 1 hour PRN      REVIEW OF SYSTEMS  --------------------------------------------------------------------------------  unable to obtain    VITALS/PHYSICAL EXAM  --------------------------------------------------------------------------------  T(C): 37 (11-28-22 @ 12:00), Max: 37 (11-27-22 @ 16:00)  HR: 72 (11-28-22 @ 13:00) (64 - 80)  BP: --  RR: 14 (11-28-22 @ 13:00) (10 - 21)  SpO2: 100% (11-28-22 @ 13:00) (100% - 100%)  Wt(kg): --        11-27-22 @ 07:01  -  11-28-22 @ 07:00  --------------------------------------------------------  IN: 2330 mL / OUT: 2810 mL / NET: -480 mL    11-28-22 @ 07:01  -  11-28-22 @ 13:25  --------------------------------------------------------  IN: 676.7 mL / OUT: 625 mL / NET: 51.7 mL      Physical Exam:  	Gen: intubated  	HEENT: +ET  	Pulm: CTA B/L  	CV: S1S2, no rub  	Abd: Soft, +BS          + presacral edema  	Ext: + LE edema B/L  	Neuro: sedated  	Skin: Warm and dry  	Vascular access: ECMO              +Va with red urine (Cyanokit)    LABS/STUDIES  --------------------------------------------------------------------------------              8.5    13.88 >-----------<  102      [11-28-22 @ 00:57]              26.0     152  |  112  |  63  ----------------------------<  157      [11-28-22 @ 00:58]  4.0   |  25  |  3.95        Ca     8.7     [11-28-22 @ 00:58]      Mg     2.4     [11-28-22 @ 00:58]      Phos  6.5     [11-28-22 @ 00:58]    TPro  6.1  /  Alb  3.2  /  TBili  0.8  /  DBili  x   /  AST  228  /  ALT  72  /  AlkPhos  63  [11-28-22 @ 00:58]    PT/INR: PT 11.7 , INR 1.01       [11-28-22 @ 04:28]  PTT: 40.6       [11-28-22 @ 13:04]          [11-28-22 @ 00:58]        [11-28-22 @ 00:58]    Creatinine Trend:  SCr 3.95 [11-28 @ 00:58]  SCr 3.81 [11-27 @ 00:33]  SCr 2.83 [11-26 @ 01:53]  SCr 2.53 [11-25 @ 20:05]  SCr 2.38 [11-25 @ 18:27]    Urinalysis - [11-25-22 @ 10:37]      Color Yellow / Appearance Slightly Turbid / SG 1.027 / pH 6.0      Gluc Negative / Ketone Trace  / Bili Negative / Urobili Negative       Blood Moderate / Protein 30 mg/dL / Leuk Est Negative / Nitrite Negative      RBC 7 / WBC 7 / Hyaline 4 / Gran  / Sq Epi  / Non Sq Epi 2 / Bacteria Negative      TSH 0.60      [11-26-22 @ 05:24]  Lipid: chol --, , HDL --, LDL --      [11-26-22 @ 20:11]       [facility] DIVISION OF KIDNEY DISEASES AND HYPERTENSION -- [phone]  -- INITIAL CONSULT NOTE  --------------------------------------------------------------------------------  HPI:  63 y/o male with PMH of CABG, DM, HTN, HLD presenting as transfer from [place] for c/f STEMI. Course c/b gallstone pancreatitis leading to ARDS and shock & cardiac arrest now on [facility]O. Had significant troponin elevation and his LVEF down to 8%. Pt was deemed to be too unstable to have an angiogram and potential PCI due to his co-morbidities & a new subdural bleed. Nephrology called for ERIC. SCr on arrival was 2.15; trended down to hector 1.6 on 11/25; now trending backup to 3.95 today. Pt is non oliguric with UOP 2.8L in 24hrs with 400cc net negative. Last lasix dose was 2 days ago.             Patient seen & examined. Currently on [facility] ECMO. CVP 9. On vasopressin       PAST HISTORY  --------------------------------------------------------------------------------  PAST MEDICAL & SURGICAL HISTORY:    FAMILY HISTORY:    PAST SOCIAL HISTORY:    ALLERGIES & MEDICATIONS  --------------------------------------------------------------------------------  Allergies    No Known Allergies    Intolerances      Standing Inpatient Medications  artificial  tears Solution 1 Drop(s) Both EYES four times a day  chlorhexidine 0.12% Liquid 15 milliLiter(s) Oral Mucosa every 12 hours  chlorhexidine 2% Cloths 1 Application(s) Topical <User Schedule>  chlorhexidine 4% Liquid 1 Application(s) Topical <User Schedule>  dexMEDEtomidine Infusion 1.5 MICROgram(s)/kG/Hr IV Continuous <Continuous>  dextrose 50% Injectable 50 milliLiter(s) IV Push every 15 minutes  heparin  Infusion 500 Unit(s)/Hr IV Continuous <Continuous>  hydrocortisone sodium succinate Injectable 25 milliGRAM(s) IV Push every 8 hours  insulin lispro (ADMELOG) corrective regimen sliding scale   SubCutaneous every 6 hours  insulin regular Infusion 6 Unit(s)/Hr IV Continuous <Continuous>  meropenem  IVPB 500 milliGRAM(s) IV Intermittent every 12 hours  pantoprazole  Injectable 40 milliGRAM(s) IV Push two times a day  polyethylene glycol 3350 17 Gram(s) Oral daily  senna 2 Tablet(s) Oral at bedtime  sodium chloride 0.9%. 1000 milliLiter(s) IV Continuous <Continuous>  vasopressin Infusion 0.027 Unit(s)/Min IV Continuous <Continuous>    PRN Inpatient Medications  sodium chloride 0.9% lock flush 10 milliLiter(s) IV Push every 1 hour PRN      REVIEW OF SYSTEMS  --------------------------------------------------------------------------------  unable to obtain    VITALS/PHYSICAL EXAM  --------------------------------------------------------------------------------  T(C): 37 (11-28-22 @ 12:00), Max: 37 (11-27-22 @ 16:00)  HR: 72 (11-28-22 @ 13:00) (64 - 80)  BP: --  RR: 14 (11-28-22 @ 13:00) (10 - 21)  SpO2: 100% (11-28-22 @ 13:00) (100% - 100%)  Wt(kg): --        11-27-22 @ 07:01  -  11-28-22 @ 07:00  --------------------------------------------------------  IN: 2330 mL / OUT: 2810 mL / NET: -480 mL    11-28-22 @ 07:01  -  11-28-22 @ 13:25  --------------------------------------------------------  IN: 676.7 mL / OUT: 625 mL / NET: 51.7 mL      Physical Exam:  	Gen: intubated  	HEENT: +ET  	Pulm: CTA B/L  	CV: S1S2, no rub  	Abd: Soft, +BS          + presacral edema  	Ext: + LE edema B/L  	Neuro: sedated  	Skin: Warm and dry  	Vascular access: ECMO              +Va with red urine (Cyanokit)    LABS/STUDIES  --------------------------------------------------------------------------------              8.5    13.88 >-----------<  102      [11-28-22 @ 00:57]              26.0     152  |  112  |  63  ----------------------------<  157      [11-28-22 @ 00:58]  4.0   |  25  |  3.95        Ca     8.7     [11-28-22 @ 00:58]      Mg     2.4     [11-28-22 @ 00:58]      Phos  6.5     [11-28-22 @ 00:58]    TPro  6.1  /  Alb  3.2  /  TBili  0.8  /  DBili  x   /  AST  228  /  ALT  72  /  AlkPhos  63  [11-28-22 @ 00:58]    PT/INR: PT 11.7 , INR 1.01       [11-28-22 @ 04:28]  PTT: 40.6       [11-28-22 @ 13:04]          [11-28-22 @ 00:58]        [11-28-22 @ 00:58]    Creatinine Trend:  SCr 3.95 [11-28 @ 00:58]  SCr 3.81 [11-27 @ 00:33]  SCr 2.83 [11-26 @ 01:53]  SCr 2.53 [11-25 @ 20:05]  SCr 2.38 [11-25 @ 18:27]    Urinalysis - [11-25-22 @ 10:37]      Color Yellow / Appearance Slightly Turbid / SG 1.027 / pH 6.0      Gluc Negative / Ketone Trace  / Bili Negative / Urobili Negative       Blood Moderate / Protein 30 mg/dL / Leuk Est Negative / Nitrite Negative      RBC 7 / WBC 7 / Hyaline 4 / Gran  / Sq Epi  / Non Sq Epi 2 / Bacteria Negative      TSH 0.60      [11-26-22 @ 05:24]  Lipid: chol --, , HDL --, LDL --      [11-26-22 @ 20:11]       Health system DIVISION OF KIDNEY DISEASES AND HYPERTENSION -- [phone]  -- INITIAL CONSULT NOTE  --------------------------------------------------------------------------------  HPI:  63 y/o male with PMH of CABG, DM, HTN, HLD presenting as transfer from [place] for c/f STEMI. Course c/b gallstone pancreatitis leading to ARDS and shock & cardiac arrest now on [facility]. Had significant troponin elevation and his LVEF down to 8%. Pt was deemed to be too unstable to have an angiogram and potential PCI due to his co-morbidities & a new subdural bleed. Nephrology called for ERIC. SCr on arrival was 2.15; trended down to hector 1.6 on 11/25; now trending backup to 3.95 today. Pt is non oliguric with UOP 2.8L in 24hrs with 400cc net negative. Last lasix dose was 2 days ago.             Patient seen & examined. Currently on [facility]. CVP 9. On vasopressin       PAST HISTORY  --------------------------------------------------------------------------------  PAST MEDICAL & SURGICAL HISTORY:    FAMILY HISTORY:    PAST SOCIAL HISTORY:    ALLERGIES & MEDICATIONS  --------------------------------------------------------------------------------  Allergies    No Known Allergies    Intolerances      Standing Inpatient Medications  artificial  tears Solution 1 Drop(s) Both EYES four times a day  chlorhexidine 0.12% Liquid 15 milliLiter(s) Oral Mucosa every 12 hours  chlorhexidine 2% Cloths 1 Application(s) Topical <User Schedule>  chlorhexidine 4% Liquid 1 Application(s) Topical <User Schedule>  dexMEDEtomidine Infusion 1.5 MICROgram(s)/kG/Hr IV Continuous <Continuous>  dextrose 50% Injectable 50 milliLiter(s) IV Push every 15 minutes  heparin  Infusion 500 Unit(s)/Hr IV Continuous <Continuous>  hydrocortisone sodium succinate Injectable 25 milliGRAM(s) IV Push every 8 hours  insulin lispro (ADMELOG) corrective regimen sliding scale   SubCutaneous every 6 hours  insulin regular Infusion 6 Unit(s)/Hr IV Continuous <Continuous>  meropenem  IVPB 500 milliGRAM(s) IV Intermittent every 12 hours  pantoprazole  Injectable 40 milliGRAM(s) IV Push two times a day  polyethylene glycol 3350 17 Gram(s) Oral daily  senna 2 Tablet(s) Oral at bedtime  sodium chloride 0.9%. 1000 milliLiter(s) IV Continuous <Continuous>  vasopressin Infusion 0.027 Unit(s)/Min IV Continuous <Continuous>    PRN Inpatient Medications  sodium chloride 0.9% lock flush 10 milliLiter(s) IV Push every 1 hour PRN      REVIEW OF SYSTEMS  --------------------------------------------------------------------------------  unable to obtain    VITALS/PHYSICAL EXAM  --------------------------------------------------------------------------------  T(C): 37 (11-28-22 @ 12:00), Max: 37 (11-27-22 @ 16:00)  HR: 72 (11-28-22 @ 13:00) (64 - 80)  BP: --  RR: 14 (11-28-22 @ 13:00) (10 - 21)  SpO2: 100% (11-28-22 @ 13:00) (100% - 100%)  Wt(kg): --        11-27-22 @ 07:01  -  11-28-22 @ 07:00  --------------------------------------------------------  IN: 2330 mL / OUT: 2810 mL / NET: -480 mL    11-28-22 @ 07:01  -  11-28-22 @ 13:25  --------------------------------------------------------  IN: 676.7 mL / OUT: 625 mL / NET: 51.7 mL      Physical Exam:  	Gen: intubated  	HEENT: +ET  	Pulm: CTA B/L  	CV: S1S2, no rub  	Abd: Soft, +BS          + presacral edema  	Ext: + LE edema B/L  	Neuro: sedated  	Skin: Warm and dry  	Vascular access: ECMO              +Va with red urine ([place])    LABS/STUDIES  --------------------------------------------------------------------------------              8.5    13.88 >-----------<  102      [11-28-22 @ 00:57]              26.0     152  |  112  |  63  ----------------------------<  157      [11-28-22 @ 00:58]  4.0   |  25  |  3.95        Ca     8.7     [11-28-22 @ 00:58]      Mg     2.4     [11-28-22 @ 00:58]      Phos  6.5     [11-28-22 @ 00:58]    TPro  6.1  /  Alb  3.2  /  TBili  0.8  /  DBili  x   /  AST  228  /  ALT  72  /  AlkPhos  63  [11-28-22 @ 00:58]    PT/INR: PT 11.7 , INR 1.01       [11-28-22 @ 04:28]  PTT: 40.6       [11-28-22 @ 13:04]          [11-28-22 @ 00:58]        [11-28-22 @ 00:58]    Creatinine Trend:  SCr 3.95 [11-28 @ 00:58]  SCr 3.81 [11-27 @ 00:33]  SCr 2.83 [11-26 @ 01:53]  SCr 2.53 [11-25 @ 20:05]  SCr 2.38 [11-25 @ 18:27]    Urinalysis - [11-25-22 @ 10:37]      Color Yellow / Appearance Slightly Turbid / SG 1.027 / pH 6.0      Gluc Negative / Ketone Trace  / Bili Negative / Urobili Negative       Blood Moderate / Protein 30 mg/dL / Leuk Est Negative / Nitrite Negative      RBC 7 / WBC 7 / Hyaline 4 / Gran  / Sq Epi  / Non Sq Epi 2 / Bacteria Negative      TSH 0.60      [11-26-22 @ 05:24]  Lipid: chol --, , HDL --, LDL --      [11-26-22 @ 20:11]

## 2022-11-28 NOTE — PROGRESS NOTE ADULT - SUBJECTIVE AND OBJECTIVE BOX
Patient seen and examined at the bedside.    Remained critically ill on continuous ICU monitoring.    OBJECTIVE:  Vital Signs Last 24 Hrs  T(C): 36.9 (28 Nov 2022 20:00), Max: 37 (28 Nov 2022 00:00)  T(F): 98.4 (28 Nov 2022 20:00), Max: 98.6 (28 Nov 2022 00:00)  HR: 76 (28 Nov 2022 20:30) (64 - 80)  BP: --  BP(mean): --  RR: 14 (28 Nov 2022 20:30) (14 - 19)  SpO2: 100% (28 Nov 2022 20:30) (100% - 100%)    Parameters below as of 28 Nov 2022 20:00  Patient On (Oxygen Delivery Method): ventilator    O2 Concentration (%): 40      Physical Exam:   General: Intubated, multiple lines gtt  Neurology: sedated  Eyes: bilateral pupils equal and reactive   ENT/Neck: +ETT midline, Neck supple, trachea midline, No JVD   Respiratory: Clear bilaterally   CV: S1S2, no murmurs        [x] Sinus rhythm  Abdominal: Soft, NT, ND +BS   Extremities: 1-2+ pedal edema noted, + peripheral pulses   Skin: No Rashes, Hematoma, Ecchymosis                                        Assessment:  63 y/o male with PMH of CABG, DM, HTN, HLD presenting as transfer from [place] for c/f STEMI. PTA arrival received 325 aspirin, 600 plavix, and no heparin drip started. Around 1:30 am patient had multiple episodes of non-bloody diarrhea and emesis with associated abdominal pain and chest pain, unable to localize, non-radiating, with associated SOB and no associated palpitations or diaphoresis. No recent fevers/chills, cough, rashes, or changes in urination. Does report mild diffuse back pain; started 5 days ago in setting on injury while walking and lifting objects. Denies focal weakness, numbness/tingling, or LOC due does report mild dizziness.    Cardiac arrest s/p [facility] ECMO 11/25  Hemodynamic instability  Hypovolemia  Post op respiratory insufficiency  Thrombocytopenia  Acute blood loss anemia  Leukocytosis         Plan:   ***Neuro***  CT head showed 4mm subdural hematoma 11/26: repeat CT head unchanged   [x] Sedated with [x] Precedex   Post operative neuro assessment     ***Cardiovascular***  Invasive hemodynamic monitoring, assess perfusion indices   SR / CVP 9/ MAP 74/ PAP 29/16 (21)/ CI 2.4/ Hct 26.0/ Lactate 0.9  [x] Vasopressin- 0.027 units/min  [x] ECMO- 3600 rpms, 3.88 flow, sweep 4  Reassessment of hemodynamics post resuscitation   [x] AC Therapy with Heparin gtt, PTT 40   Went into Bradycardic and Hypoxic arrest 11/25  Plan for ECMO decannulation next week?? f/u   Serial EKG and cardiac enzymes       ***Pulmonary***  [x]  Nitric oxide- 20 ppm   Post op vent management   Titration of FiO2 and PEEP, follow SpO2, CXR, blood gasses       Mode: AC/ CMV (Assist Control/ Continuous Mandatory Ventilation)  RR (machine): 14  FiO2: 40  PEEP: 10  ITime: 1  MAP: 14  PC: 10  PIP: 20              ***GI***  [x] Tolerating TF Vital AF, @ goal 10cc/hr.   [x] Protonix    Bowel regimen with Miralax and senna     ***Renal***  Continue to monitor I/Os, BUN/Creatinine.   Replete lytes PRN  De Dios present    ***ID***  Meropenem for empiric coverage  Follow up cultures      ***Endocrine***  [x]  DM2: HbA1c 7.3%                - [x] Insulin gtt [x] ISS             - Need tight glycemic control to prevent wound infection.          Patient requires continuous monitoring with bedside rhythm monitoring, pulse oximetry monitoring, and continuous central venous and arterial pressure monitoring; and intermittent blood gas analysis. Care plan discussed with the ICU care team.   Patient remained critical, at risk for life threatening decompensation.    I have spent 35 minutes providing critical care management to this patient.    By signing my name below, I, Abhijit Ngo, attest that this documentation has been prepared under the direction and in the presence of BROOKE Yap   Electronically signed: Brian Slaughter, 11-28-22 @ 21:08    I, BROOKE Yap, personally performed the services described in this documentation. all medical record entries made by the karlaibkyle were at my direction and in my presence. I have reviewed the chart and agree that the record reflects my personal performance and is accurate and complete  Electronically signed: BROOKE Yap  Patient seen and examined at the bedside.    Remained critically ill on continuous ICU monitoring.    OBJECTIVE:  Vital Signs Last 24 Hrs  T(C): 36.9 (28 Nov 2022 20:00), Max: 37 (28 Nov 2022 00:00)  T(F): 98.4 (28 Nov 2022 20:00), Max: 98.6 (28 Nov 2022 00:00)  HR: 76 (28 Nov 2022 20:30) (64 - 80)  BP: --  BP(mean): --  RR: 14 (28 Nov 2022 20:30) (14 - 19)  SpO2: 100% (28 Nov 2022 20:30) (100% - 100%)    Parameters below as of 28 Nov 2022 20:00  Patient On (Oxygen Delivery Method): ventilator    O2 Concentration (%): 40      Physical Exam:   General: Intubated, multiple lines gtt  Neurology: sedated  Eyes: bilateral pupils equal and reactive   ENT/Neck: +ETT midline, Neck supple, trachea midline, No JVD   Respiratory: Clear bilaterally   CV: S1S2, no murmurs        [x] Sinus rhythm  Abdominal: Soft, NT, ND +BS   Extremities: 1-2+ pedal edema noted, + peripheral pulses   Skin: No Rashes, Hematoma, Ecchymosis                                        Assessment:  61 y/o male with PMH of CABG, DM, HTN, HLD presenting as transfer from [place] for c/f STEMI. PTA arrival received 325 aspirin, 600 plavix, and no heparin drip started. Around 1:30 am patient had multiple episodes of non-bloody diarrhea and emesis with associated abdominal pain and chest pain, unable to localize, non-radiating, with associated SOB and no associated palpitations or diaphoresis. No recent fevers/chills, cough, rashes, or changes in urination. Does report mild diffuse back pain; started 5 days ago in setting on injury while walking and lifting objects. Denies focal weakness, numbness/tingling, or LOC due does report mild dizziness.    Cardiac arrest s/p [facility] ECMO 11/25  Hemodynamic instability  Hypovolemia  Post op respiratory insufficiency  Thrombocytopenia  Acute blood loss anemia  Leukocytosis         Plan:   ***Neuro***  CT head showed 4mm subdural hematoma 11/26: repeat CT head unchanged   [x] Sedated with [x] Precedex   Post operative neuro assessment     ***Cardiovascular***  Invasive hemodynamic monitoring, assess perfusion indices   SR / CVP 9/ MAP 74/ PAP 29/16 (21)/ CI 2.4/ Hct 26.0/ Lactate 0.9  [x] Vasopressin- 0.027 units/min  [x] ECMO- 3600 rpms, 3.88 flow, sweep 4  Reassessment of hemodynamics post resuscitation   [x] AC Therapy with Heparin gtt, PTT 40   Went into Bradycardic and Hypoxic arrest 11/25  Plan for ECMO decannulation next week?? f/u   Serial EKG and cardiac enzymes       ***Pulmonary***  [x]  Nitric oxide- 20 ppm   Post op vent management   Titration of FiO2 and PEEP, follow SpO2, CXR, blood gasses       Mode: AC/ CMV (Assist Control/ Continuous Mandatory Ventilation)  RR (machine): 14  FiO2: 40  PEEP: 10  ITime: 1  MAP: 14  PC: 10  PIP: 20              ***GI***  [x] Tolerating TF Vital AF, @ goal 10cc/hr.   [x] Protonix    Bowel regimen with Miralax and senna     ***Renal***  Continue to monitor I/Os, BUN/Creatinine.   Replete lytes PRN  De Dios present    ***ID***  Meropenem for empiric coverage  Follow up cultures      ***Endocrine***  [x]  DM2: HbA1c 7.3%                - [x] Insulin gtt [x] ISS             - Need tight glycemic control to prevent wound infection.          Patient requires continuous monitoring with bedside rhythm monitoring, pulse oximetry monitoring, and continuous central venous and arterial pressure monitoring; and intermittent blood gas analysis. Care plan discussed with the ICU care team.   Patient remained critical, at risk for life threatening decompensation.    I have spent 35 minutes providing critical care management to this patient.    By signing my name below, I, Abhijit Ngo, attest that this documentation has been prepared under the direction and in the presence of BROOKE Yap   Electronically signed: Brian Slaughter, 11-28-22 @ 21:08    I, BROOKE Yap, personally performed the services described in this documentation. all medical record entries made by the karlaibkyle were at my direction and in my presence. I have reviewed the chart and agree that the record reflects my personal performance and is accurate and complete  Electronically signed: BROOKE Yap  Patient seen and examined at the bedside.    Remained critically ill on continuous ICU monitoring.    OBJECTIVE:  Vital Signs Last 24 Hrs  T(C): 36.9 (28 Nov 2022 20:00), Max: 37 (28 Nov 2022 00:00)  T(F): 98.4 (28 Nov 2022 20:00), Max: 98.6 (28 Nov 2022 00:00)  HR: 76 (28 Nov 2022 20:30) (64 - 80)  BP: --  BP(mean): --  RR: 14 (28 Nov 2022 20:30) (14 - 19)  SpO2: 100% (28 Nov 2022 20:30) (100% - 100%)    Parameters below as of 28 Nov 2022 20:00  Patient On (Oxygen Delivery Method): ventilator    O2 Concentration (%): 40      Physical Exam:   General: Intubated, multiple lines gtt  Neurology: sedated  Eyes: bilateral pupils equal and reactive   ENT/Neck: +ETT midline, Neck supple, trachea midline, No JVD   Respiratory: Clear bilaterally   CV: S1S2, no murmurs        [x] Sinus rhythm  Abdominal: Soft, NT, ND +BS   Extremities: 1-2+ pedal edema noted, + peripheral pulses   Skin: No Rashes, Hematoma, Ecchymosis                                        Assessment:  61 y/o male with PMH of CABG, DM, HTN, HLD presenting as transfer from [place] for c/f STEMI. PTA arrival received 325 aspirin, 600 plavix, and no heparin drip started. Around 1:30 am patient had multiple episodes of non-bloody diarrhea and emesis with associated abdominal pain and chest pain, unable to localize, non-radiating, with associated SOB and no associated palpitations or diaphoresis. No recent fevers/chills, cough, rashes, or changes in urination. Does report mild diffuse back pain; started 5 days ago in setting on injury while walking and lifting objects. Denies focal weakness, numbness/tingling, or LOC due does report mild dizziness.    Cardiac arrest s/p [facility] ECMO 11/25  Hemodynamic instability  Hypovolemia  Post op respiratory insufficiency  Thrombocytopenia  Acute blood loss anemia  Leukocytosis         Plan:   ***Neuro***  CT head showed 4mm subdural hematoma 11/26: repeat CT head unchanged   [x] Sedated with [x] Precedex   Post operative neuro assessment     ***Cardiovascular***  Invasive hemodynamic monitoring, assess perfusion indices   SR / CVP 9/ MAP 74/ PAP 29/16 (21)/ CI 2.4/ Hct 26.0/ Lactate 0.9  [x] Vasopressin- 0.027 units/min  [x] ECMO- 3600 rpms, 3.88 flow, sweep 4  Reassessment of hemodynamics post resuscitation   [x] AC Therapy with Heparin gtt, PTT 40   Went into Bradycardic and Hypoxic arrest 11/25  Plan for ECMO decannulation next week?? f/u   Serial EKG and cardiac enzymes       ***Pulmonary***  [x]  Nitric oxide- 20 ppm   Post op vent management   Titration of FiO2 and PEEP, follow SpO2, CXR, blood gasses       Mode: AC/ CMV (Assist Control/ Continuous Mandatory Ventilation)  RR (machine): 14  FiO2: 40  PEEP: 10  ITime: 1  MAP: 14  PC: 10  PIP: 20              ***GI***  [x] Tolerating TF Vital AF, @ goal 10cc/hr.   [x] Protonix    Bowel regimen with Miralax and senna     ***Renal***  Continue to monitor I/Os, BUN/Creatinine.   Replete lytes PRN  De Dios present    ***ID***  Meropenem for empiric coverage  Follow up cultures      ***Endocrine***  [x]  DM2: HbA1c 7.3%                - [x] Insulin gtt [x] ISS             - Need tight glycemic control to prevent wound infection.          Patient requires continuous monitoring with bedside rhythm monitoring, pulse oximetry monitoring, and continuous central venous and arterial pressure monitoring; and intermittent blood gas analysis. Care plan discussed with the ICU care team.   Patient remained critical, at risk for life threatening decompensation.    I have spent 35 minutes providing critical care management to this patient.    By signing my name below, I, Abhijit Ngo, attest that this documentation has been prepared under the direction and in the presence of BROOKE Yap   Electronically signed: Brian Slaughter, 11-28-22 @ 21:08    I, BROOKE Yap, personally performed the services described in this documentation. all medical record entries made by the karlaibkyle were at my direction and in my presence. I have reviewed the chart and agree that the record reflects my personal performance and is accurate and complete  Electronically signed: BROOKE Yap  Patient seen and examined at the bedside.    Remained critically ill on continuous ICU monitoring.    OBJECTIVE:  Vital Signs Last 24 Hrs  T(C): 36.9 (28 Nov 2022 20:00), Max: 37 (28 Nov 2022 00:00)  T(F): 98.4 (28 Nov 2022 20:00), Max: 98.6 (28 Nov 2022 00:00)  HR: 76 (28 Nov 2022 20:30) (64 - 80)  BP: 122/64  BP(mean): 79  RR: 14 (28 Nov 2022 20:30) (14 - 19)  SpO2: 100% (28 Nov 2022 20:30) (100% - 100%)    Parameters below as of 28 Nov 2022 20:00  Patient On (Oxygen Delivery Method): ventilator    O2 Concentration (%): 40      Physical Exam:   General: Intubated, multiple lines gtt  Neurology: sedated > on sedation vacation woke up and followed commands  Eyes: bilateral pupils equal and reactive   ENT/Neck: +ETT midline, Neck supple, trachea midline, No JVD   Respiratory: Clear bilaterally   CV: S1S2, no murmurs        [x] Sinus rhythm  Abdominal: Soft, NT, ND +BS   Extremities: 1-2+ pedal edema noted, + peripheral pulses   Skin: No Rashes, Hematoma, Ecchymosis                                        Assessment:  61 y/o male with PMH of CABG, DM, HTN, HLD presenting as transfer from [place] for c/f STEMI. PTA arrival received 325 aspirin, 600 plavix, and no heparin drip started. Around 1:30 am patient had multiple episodes of non-bloody diarrhea and emesis with associated abdominal pain and chest pain, unable to localize, non-radiating, with associated SOB and no associated palpitations or diaphoresis. No recent fevers/chills, cough, rashes, or changes in urination. Does report mild diffuse back pain; started 5 days ago in setting on injury while walking and lifting objects. Denies focal weakness, numbness/tingling, or LOC due does report mild dizziness.    Cardiac arrest s/p [facility] ECMO 11/25  Hemodynamic instability  Hypovolemia  Post op respiratory insufficiency  Thrombocytopenia  Acute blood loss anemia  Leukocytosis         Plan:   ***Neuro***  CT head showed 4mm subdural hematoma 11/26: repeat CT head unchanged   [x] Sedated with [x] Precedex   Post operative neuro assessment     ***Cardiovascular***  Invasive hemodynamic monitoring, assess perfusion indices   SR / CVP 9/ MAP 74/ PAP 29/16 (21)/ CI 2.4/ Hct 26.0/ Lactate 0.9  [x] Vasopressin- off at this time, was on cardene briefly as well  [x] ECMO- 3600 rpms, 3.88 flow, sweep 4  Reassessment of hemodynamics post resuscitation   [x] AC Therapy with Heparin gtt, PTT 40 -50 goal, no higher d/t SDH  Went into Bradycardic and Hypoxic arrest 11/25  Follow up plans for ECMO decannulation  Serial EKG and cardiac enzymes       ***Pulmonary***  [x]  Nitric oxide- 20 ppm   Post op vent management   Titration of FiO2 and PEEP, follow SpO2, CXR, blood gasses   100% FiO2 on ECMO  Titrate sweep for pco2 and ph      Mode: AC/ CMV (Assist Control/ Continuous Mandatory Ventilation)  RR (machine): 14  FiO2: 40  PEEP: 10  ITime: 1  MAP: 14  PC: 10  PIP: 20              ***GI***  [x] Tolerating TF Vital AF, @ goal 10cc/hr.   [x] Protonix    Bowel regimen with Miralax and senna     ***Renal***  Continue to monitor I/Os, BUN/Creatinine.   Replete lytes TRINITYN  Va present  No diuresis today    ***ID***  Meropenem for empiric coverage  Follow up cultures      ***Endocrine***  [x]  DM2: HbA1c 7.3%                - [x] Insulin gtt [x] ISS             - Need tight glycemic control to prevent wound infection.          Patient requires continuous monitoring with bedside rhythm monitoring, pulse oximetry monitoring, and continuous central venous and arterial pressure monitoring; and intermittent blood gas analysis. Care plan discussed with the ICU care team.   Patient remained critical, at risk for life threatening decompensation.    I have spent 35 minutes providing critical care management to this patient.    By signing my name below, I, Abhijit Ngo, attest that this documentation has been prepared under the direction and in the presence of BROOKE Yap   Electronically signed: Brian Slaughter, 11-28-22 @ 21:08    I, BROOKE Yap, personally performed the services described in this documentation. all medical record entries made by the scribe were at my direction and in my presence. I have reviewed the chart and agree that the record reflects my personal performance and is accurate and complete  Electronically signed: BROOKE Yap  Patient seen and examined at the bedside.    Remained critically ill on continuous ICU monitoring.    OBJECTIVE:  Vital Signs Last 24 Hrs  T(C): 36.9 (28 Nov 2022 20:00), Max: 37 (28 Nov 2022 00:00)  T(F): 98.4 (28 Nov 2022 20:00), Max: 98.6 (28 Nov 2022 00:00)  HR: 76 (28 Nov 2022 20:30) (64 - 80)  BP: 122/64  BP(mean): 79  RR: 14 (28 Nov 2022 20:30) (14 - 19)  SpO2: 100% (28 Nov 2022 20:30) (100% - 100%)    Parameters below as of 28 Nov 2022 20:00  Patient On (Oxygen Delivery Method): ventilator    O2 Concentration (%): 40      Physical Exam:   General: Intubated, multiple lines gtt  Neurology: sedated > on sedation vacation woke up and followed commands  Eyes: bilateral pupils equal and reactive   ENT/Neck: +ETT midline, Neck supple, trachea midline, No JVD   Respiratory: Clear bilaterally   CV: S1S2, no murmurs        [x] Sinus rhythm  Abdominal: Soft, NT, ND +BS   Extremities: 1-2+ pedal edema noted, + peripheral pulses   Skin: No Rashes, Hematoma, Ecchymosis                                        Assessment:  63 y/o male with PMH of CABG, DM, HTN, HLD presenting as transfer from [place] for c/f STEMI. PTA arrival received 325 aspirin, 600 plavix, and no heparin drip started. Around 1:30 am patient had multiple episodes of non-bloody diarrhea and emesis with associated abdominal pain and chest pain, unable to localize, non-radiating, with associated SOB and no associated palpitations or diaphoresis. No recent fevers/chills, cough, rashes, or changes in urination. Does report mild diffuse back pain; started 5 days ago in setting on injury while walking and lifting objects. Denies focal weakness, numbness/tingling, or LOC due does report mild dizziness.    Cardiac arrest s/p [facility] ECMO 11/25  Hemodynamic instability  Hypovolemia  Post op respiratory insufficiency  Thrombocytopenia  Acute blood loss anemia  Leukocytosis         Plan:   ***Neuro***  CT head showed 4mm subdural hematoma 11/26: repeat CT head unchanged   [x] Sedated with [x] Precedex   Post operative neuro assessment     ***Cardiovascular***  Invasive hemodynamic monitoring, assess perfusion indices   SR / CVP 9/ MAP 74/ PAP 29/16 (21)/ CI 2.4/ Hct 26.0/ Lactate 0.9  [x] Vasopressin- off at this time, was on cardene briefly as well  [x] ECMO- 3600 rpms, 3.88 flow, sweep 4  Reassessment of hemodynamics post resuscitation   [x] AC Therapy with Heparin gtt, PTT 40 -50 goal, no higher d/t SDH  Went into Bradycardic and Hypoxic arrest 11/25  Follow up plans for ECMO decannulation  Serial EKG and cardiac enzymes       ***Pulmonary***  [x]  Nitric oxide- 20 ppm   Post op vent management   Titration of FiO2 and PEEP, follow SpO2, CXR, blood gasses   100% FiO2 on ECMO  Titrate sweep for pco2 and ph      Mode: AC/ CMV (Assist Control/ Continuous Mandatory Ventilation)  RR (machine): 14  FiO2: 40  PEEP: 10  ITime: 1  MAP: 14  PC: 10  PIP: 20              ***GI***  [x] Tolerating TF Vital AF, @ goal 10cc/hr.   [x] Protonix    Bowel regimen with Miralax and senna     ***Renal***  Continue to monitor I/Os, BUN/Creatinine.   Replete lytes TRINITYN  Va present  No diuresis today    ***ID***  Meropenem for empiric coverage  Follow up cultures      ***Endocrine***  [x]  DM2: HbA1c 7.3%                - [x] Insulin gtt [x] ISS             - Need tight glycemic control to prevent wound infection.          Patient requires continuous monitoring with bedside rhythm monitoring, pulse oximetry monitoring, and continuous central venous and arterial pressure monitoring; and intermittent blood gas analysis. Care plan discussed with the ICU care team.   Patient remained critical, at risk for life threatening decompensation.    I have spent 35 minutes providing critical care management to this patient.    By signing my name below, I, Abhijit Ngo, attest that this documentation has been prepared under the direction and in the presence of BROOKE Yap   Electronically signed: Brian Slaughter, 11-28-22 @ 21:08    I, BROOKE Yap, personally performed the services described in this documentation. all medical record entries made by the scribe were at my direction and in my presence. I have reviewed the chart and agree that the record reflects my personal performance and is accurate and complete  Electronically signed: BROOKE Yap  Patient seen and examined at the bedside.    Remained critically ill on continuous ICU monitoring.    OBJECTIVE:  Vital Signs Last 24 Hrs  T(C): 36.9 (28 Nov 2022 20:00), Max: 37 (28 Nov 2022 00:00)  T(F): 98.4 (28 Nov 2022 20:00), Max: 98.6 (28 Nov 2022 00:00)  HR: 76 (28 Nov 2022 20:30) (64 - 80)  BP: 122/64  BP(mean): 79  RR: 14 (28 Nov 2022 20:30) (14 - 19)  SpO2: 100% (28 Nov 2022 20:30) (100% - 100%)    Parameters below as of 28 Nov 2022 20:00  Patient On (Oxygen Delivery Method): ventilator    O2 Concentration (%): 40      Physical Exam:   General: Intubated, multiple lines gtt  Neurology: sedated > on sedation vacation woke up and followed commands  Eyes: bilateral pupils equal and reactive   ENT/Neck: +ETT midline, Neck supple, trachea midline, No JVD   Respiratory: Clear bilaterally   CV: S1S2, no murmurs        [x] Sinus rhythm  Abdominal: Soft, NT, ND +BS   Extremities: 1-2+ pedal edema noted, + peripheral pulses   Skin: No Rashes, Hematoma, Ecchymosis                                        Assessment:  61 y/o male with PMH of CABG, DM, HTN, HLD presenting as transfer from [place] for c/f STEMI. PTA arrival received 325 aspirin, 600 plavix, and no heparin drip started. Around 1:30 am patient had multiple episodes of non-bloody diarrhea and emesis with associated abdominal pain and chest pain, unable to localize, non-radiating, with associated SOB and no associated palpitations or diaphoresis. No recent fevers/chills, cough, rashes, or changes in urination. Does report mild diffuse back pain; started 5 days ago in setting on injury while walking and lifting objects. Denies focal weakness, numbness/tingling, or LOC due does report mild dizziness.    Cardiac arrest s/p VA ECMO 11/25  Hemodynamic instability  Hypovolemia  Post op respiratory insufficiency  Thrombocytopenia  Acute blood loss anemia  Leukocytosis         Plan:   ***Neuro***  CT head showed 4mm subdural hematoma 11/26: repeat CT head unchanged   [x] Sedated with [x] Precedex   Post operative neuro assessment     ***Cardiovascular***  Invasive hemodynamic monitoring, assess perfusion indices   SR / CVP 9/ MAP 74/ PAP 29/16 (21)/ CI 2.4/ Hct 26.0/ Lactate 0.9  [x] Vasopressin- off at this time, was on cardene briefly as well  [x] ECMO- 3600 rpms, 3.88 flow, sweep 4  Reassessment of hemodynamics post resuscitation   [x] AC Therapy with Heparin gtt, PTT 40 -50 goal, no higher d/t SDH  Went into Bradycardic and Hypoxic arrest 11/25  Follow up plans for ECMO decannulation  Serial EKG and cardiac enzymes       ***Pulmonary***  [x]  Nitric oxide- 20 ppm   Post op vent management   Titration of FiO2 and PEEP, follow SpO2, CXR, blood gasses   100% FiO2 on ECMO  Titrate sweep for pco2 and ph      Mode: AC/ CMV (Assist Control/ Continuous Mandatory Ventilation)  RR (machine): 14  FiO2: 40  PEEP: 10  ITime: 1  MAP: 14  PC: 10  PIP: 20              ***GI***  [x] Tolerating TF Vital AF, @ goal 10cc/hr.   [x] Protonix    Bowel regimen with Miralax and senna     ***Renal***  Continue to monitor I/Os, BUN/Creatinine.   Replete lytes TRINITYN  Va present  No diuresis today    ***ID***  Meropenem for empiric coverage  Follow up cultures      ***Endocrine***  [x]  DM2: HbA1c 7.3%                - [x] Insulin gtt [x] ISS             - Need tight glycemic control to prevent wound infection.          Patient requires continuous monitoring with bedside rhythm monitoring, pulse oximetry monitoring, and continuous central venous and arterial pressure monitoring; and intermittent blood gas analysis. Care plan discussed with the ICU care team.   Patient remained critical, at risk for life threatening decompensation.    I have spent 35 minutes providing critical care management to this patient.    By signing my name below, I, Abhijit Ngo, attest that this documentation has been prepared under the direction and in the presence of BROOKE Yap   Electronically signed: Brian Slaughter, 11-28-22 @ 21:08    I, BROOKE Yap, personally performed the services described in this documentation. all medical record entries made by the scribe were at my direction and in my presence. I have reviewed the chart and agree that the record reflects my personal performance and is accurate and complete  Electronically signed: BROOKE Yap

## 2022-11-28 NOTE — CONSULT NOTE ADULT - ATTENDING COMMENTS
Edited note above.    Weaned ECMO flows to 4 L/min from 4.6 L/min which he tolerated well. His TTE shows AV is now definitively opening and he has good pulsatility. He is not requiring vasopressors. His CXR is improving and CVP is low. UOP is excellent off diuretics. Will place PAC today. Will plan for LHC and possible IABP pending improvement in Cr. If LV dysfunction persists and unable to continue to wean ECMO will place IABP for venting. Edited note above.    Weaned ECMO flows to 4 L/min from 4.6 L/min which he tolerated well. His repeat TTE still shows significantly worsened LV dysfunction post arrest but his AV is now definitively opening and he has good pulsatility despite being fully loaded on ECMO. He is not requiring vasopressors. His CXR is improving and CVP is low. UOP is excellent off diuretics.     Will place PAC today to help guide ongoing weaning. Given acute dysfunction post-arrest, the goal is for recovery at this time. Will plan for LHC pending improvement in creatinine and if LV function still significant and recovery is slower than anticipated will plan for IABP for venting and transitional weaning from ECMO. His pulmonary function is unclear at this time given ECMO cannulation but would wean FdO2/sweep as tolerates to get a sense of his ECMO dependence from a pulmonary perspective.     Case discussed with Ravi Mcgraw, and Fabian.

## 2022-11-28 NOTE — CONSULT NOTE ADULT - SUBJECTIVE AND OBJECTIVE BOX
HPI:  61 y/o male with PMH of CABG in [facility] ~3years ago, DM, HTN, HLD presenting as transfer from [place] for c/f STEMI. Course c/b gallstone pancreatitis leading to ARDS and shock , eventual PEA arrest, ROSC achieve ~30min, now on [facility] ECMO. Had significant troponin elevation and his LVEF pre arrest was ~45%, post arrest down to 8%. Pt was deemed to be too unstable to have an angiogram and potential PCI due to his co-morbidities & a new subdural bleed. transferred to CTU for further management. advanced HF consulted to assist in management of post arrest cardiogenic shock. requiring [facility] EMCO.       ROS: A 10-point review of systems was otherwise negative.    PAST MEDICAL & SURGICAL HISTORY:    SOCIAL HISTORY:  FAMILY HISTORY:    ALLERGIES: 	  No Known Allergies          MEDICATIONS:  artificial  tears Solution 1 Drop(s) Both EYES four times a day  chlorhexidine 0.12% Liquid 15 milliLiter(s) Oral Mucosa every 12 hours  chlorhexidine 2% Cloths 1 Application(s) Topical <User Schedule>  chlorhexidine 4% Liquid 1 Application(s) Topical <User Schedule>  dexMEDEtomidine Infusion 1.5 MICROgram(s)/kG/Hr IV Continuous <Continuous>  dextrose 50% Injectable 50 milliLiter(s) IV Push every 15 minutes  heparin  Infusion 500 Unit(s)/Hr IV Continuous <Continuous>  hydrocortisone sodium succinate Injectable 25 milliGRAM(s) IV Push every 8 hours  insulin lispro (ADMELOG) corrective regimen sliding scale   SubCutaneous every 6 hours  insulin regular Infusion 6 Unit(s)/Hr IV Continuous <Continuous>  meropenem  IVPB 500 milliGRAM(s) IV Intermittent every 12 hours  pantoprazole  Injectable 40 milliGRAM(s) IV Push two times a day  polyethylene glycol 3350 17 Gram(s) Oral daily  senna 2 Tablet(s) Oral at bedtime  sodium chloride 0.9% lock flush 10 milliLiter(s) IV Push every 1 hour PRN  sodium chloride 0.9%. 1000 milliLiter(s) IV Continuous <Continuous>  vasopressin Infusion 0.027 Unit(s)/Min IV Continuous <Continuous>      PHYSICAL EXAM:  T(C): 37 (11-28-22 @ 12:00), Max: 37 (11-27-22 @ 16:00)  HR: 72 (11-28-22 @ 14:00) (64 - 80)  BP: --  RR: 14 (11-28-22 @ 14:00) (10 - 21)  SpO2: 100% (11-28-22 @ 14:00) (100% - 100%)  Wt(kg): --    RASS -4, intubated/ventilated  neck supple, no JVD  Right fem arterial cannula (no anterograde stick)  Left fem venous cannula  heart RRR s1s2 no m   Lungs coarse breath sounds, good air entry b/l   Ext warm, trace edema, dopplerable pulses b/l    I&O's Summary    27 Nov 2022 07:01  -  28 Nov 2022 07:00  --------------------------------------------------------  IN: 2330 mL / OUT: 2810 mL / NET: -480 mL    28 Nov 2022 07:01  -  28 Nov 2022 14:26  --------------------------------------------------------  IN: 724.9 mL / OUT: 750 mL / NET: -25.1 mL        LABS:	 	                        8.5    13.88 )-----------( 102      ( 28 Nov 2022 00:57 )             26.0     11-28    152<H>  |  112<H>  |  63<H>  ----------------------------<  157<H>  4.0   |  25  |  3.95<H>    Ca    8.7      28 Nov 2022 00:58  Phos  6.5     11-28  Mg     2.4     11-28    TPro  6.1  /  Alb  3.2<L>  /  TBili  0.8  /  DBili  x   /  AST  228<H>  /  ALT  72<H>  /  AlkPhos  63  11-28    CARDIAC MARKERS ( 28 Nov 2022 00:58 )  x     / x     / 426 U/L / x     / 9.9 ng/mL  CARDIAC MARKERS ( 27 Nov 2022 00:33 )  x     / x     / 1659 U/L / x     / 43.3 ng/mL      11/25   Conclusions:  1. Tethered mitral valve leaflets with normal opening.  Mitral annular calcification. Mild mitral regurgitation.  2. Moderately dilated left atrium.  LA volume index = 42  cc/m2.  3. Moderate segmental left ventricular systolic  dysfunction. Endocardial visualization enhanced with  intravenous injection of Ultrasonic Enhancing Agent  (Definity). No left ventricular thrombus. LVEF calculated  using biplane Sequeira's method is 43%. Akinesis of the  mid-basal anterolateral wall, inferior and inferolateral  wall with all remaining walls hypokinetic.  4. Moderate diastolic dysfunction (Stage II).  5. Normal right atrium.  6. Normal right ventricular size and function.  7. Normal tricuspid valve. Minimal tricuspid regurgitation.  8. No pericardial effusion seen.     HPI:  63 y/o male with PMH of CABG in [facility] ~3years ago, DM, HTN, HLD presenting as transfer from [place] for c/f STEMI. Course c/b gallstone pancreatitis leading to ARDS and shock , eventual PEA arrest, ROSC achieve ~30min, now on [facility] ECMO. Had significant troponin elevation and his LVEF pre arrest was ~45%, post arrest down to 8%. Pt was deemed to be too unstable to have an angiogram and potential PCI due to his co-morbidities & a new subdural bleed. transferred to CTU for further management. advanced HF consulted to assist in management of post arrest cardiogenic shock. requiring [facility] EMCO.       ROS: A 10-point review of systems was otherwise negative.    PAST MEDICAL & SURGICAL HISTORY:    SOCIAL HISTORY:  FAMILY HISTORY:    ALLERGIES: 	  No Known Allergies          MEDICATIONS:  artificial  tears Solution 1 Drop(s) Both EYES four times a day  chlorhexidine 0.12% Liquid 15 milliLiter(s) Oral Mucosa every 12 hours  chlorhexidine 2% Cloths 1 Application(s) Topical <User Schedule>  chlorhexidine 4% Liquid 1 Application(s) Topical <User Schedule>  dexMEDEtomidine Infusion 1.5 MICROgram(s)/kG/Hr IV Continuous <Continuous>  dextrose 50% Injectable 50 milliLiter(s) IV Push every 15 minutes  heparin  Infusion 500 Unit(s)/Hr IV Continuous <Continuous>  hydrocortisone sodium succinate Injectable 25 milliGRAM(s) IV Push every 8 hours  insulin lispro (ADMELOG) corrective regimen sliding scale   SubCutaneous every 6 hours  insulin regular Infusion 6 Unit(s)/Hr IV Continuous <Continuous>  meropenem  IVPB 500 milliGRAM(s) IV Intermittent every 12 hours  pantoprazole  Injectable 40 milliGRAM(s) IV Push two times a day  polyethylene glycol 3350 17 Gram(s) Oral daily  senna 2 Tablet(s) Oral at bedtime  sodium chloride 0.9% lock flush 10 milliLiter(s) IV Push every 1 hour PRN  sodium chloride 0.9%. 1000 milliLiter(s) IV Continuous <Continuous>  vasopressin Infusion 0.027 Unit(s)/Min IV Continuous <Continuous>      PHYSICAL EXAM:  T(C): 37 (11-28-22 @ 12:00), Max: 37 (11-27-22 @ 16:00)  HR: 72 (11-28-22 @ 14:00) (64 - 80)  BP: --  RR: 14 (11-28-22 @ 14:00) (10 - 21)  SpO2: 100% (11-28-22 @ 14:00) (100% - 100%)  Wt(kg): --    RASS -4, intubated/ventilated  neck supple, no JVD  Right fem arterial cannula (no anterograde stick)  Left fem venous cannula  heart RRR s1s2 no m   Lungs coarse breath sounds, good air entry b/l   Ext warm, trace edema, dopplerable pulses b/l    I&O's Summary    27 Nov 2022 07:01  -  28 Nov 2022 07:00  --------------------------------------------------------  IN: 2330 mL / OUT: 2810 mL / NET: -480 mL    28 Nov 2022 07:01  -  28 Nov 2022 14:26  --------------------------------------------------------  IN: 724.9 mL / OUT: 750 mL / NET: -25.1 mL        LABS:	 	                        8.5    13.88 )-----------( 102      ( 28 Nov 2022 00:57 )             26.0     11-28    152<H>  |  112<H>  |  63<H>  ----------------------------<  157<H>  4.0   |  25  |  3.95<H>    Ca    8.7      28 Nov 2022 00:58  Phos  6.5     11-28  Mg     2.4     11-28    TPro  6.1  /  Alb  3.2<L>  /  TBili  0.8  /  DBili  x   /  AST  228<H>  /  ALT  72<H>  /  AlkPhos  63  11-28    CARDIAC MARKERS ( 28 Nov 2022 00:58 )  x     / x     / 426 U/L / x     / 9.9 ng/mL  CARDIAC MARKERS ( 27 Nov 2022 00:33 )  x     / x     / 1659 U/L / x     / 43.3 ng/mL      11/25   Conclusions:  1. Tethered mitral valve leaflets with normal opening.  Mitral annular calcification. Mild mitral regurgitation.  2. Moderately dilated left atrium.  LA volume index = 42  cc/m2.  3. Moderate segmental left ventricular systolic  dysfunction. Endocardial visualization enhanced with  intravenous injection of Ultrasonic Enhancing Agent  (Definity). No left ventricular thrombus. LVEF calculated  using biplane Sequeira's method is 43%. Akinesis of the  mid-basal anterolateral wall, inferior and inferolateral  wall with all remaining walls hypokinetic.  4. Moderate diastolic dysfunction (Stage II).  5. Normal right atrium.  6. Normal right ventricular size and function.  7. Normal tricuspid valve. Minimal tricuspid regurgitation.  8. No pericardial effusion seen.     HPI:  63 y/o male with PMH of CABG in [facility] ~3years ago, DM, HTN, HLD presenting as transfer from [place] for c/f STEMI. Course c/b gallstone pancreatitis leading to ARDS and shock , eventual PEA arrest, ROSC achieve ~30min, now on [facility] ECMO. Had significant troponin elevation and his LVEF pre arrest was ~45%, post arrest down to 8%. Pt was deemed to be too unstable to have an angiogram and potential PCI due to his co-morbidities & a new subdural bleed. transferred to CTU for further management. advanced HF consulted to assist in management of post arrest cardiogenic shock. requiring [facility] EMCO.       ROS: A 10-point review of systems was otherwise negative.    PAST MEDICAL & SURGICAL HISTORY:    SOCIAL HISTORY:  FAMILY HISTORY:    ALLERGIES: 	  No Known Allergies          MEDICATIONS:  artificial  tears Solution 1 Drop(s) Both EYES four times a day  chlorhexidine 0.12% Liquid 15 milliLiter(s) Oral Mucosa every 12 hours  chlorhexidine 2% Cloths 1 Application(s) Topical <User Schedule>  chlorhexidine 4% Liquid 1 Application(s) Topical <User Schedule>  dexMEDEtomidine Infusion 1.5 MICROgram(s)/kG/Hr IV Continuous <Continuous>  dextrose 50% Injectable 50 milliLiter(s) IV Push every 15 minutes  heparin  Infusion 500 Unit(s)/Hr IV Continuous <Continuous>  hydrocortisone sodium succinate Injectable 25 milliGRAM(s) IV Push every 8 hours  insulin lispro (ADMELOG) corrective regimen sliding scale   SubCutaneous every 6 hours  insulin regular Infusion 6 Unit(s)/Hr IV Continuous <Continuous>  meropenem  IVPB 500 milliGRAM(s) IV Intermittent every 12 hours  pantoprazole  Injectable 40 milliGRAM(s) IV Push two times a day  polyethylene glycol 3350 17 Gram(s) Oral daily  senna 2 Tablet(s) Oral at bedtime  sodium chloride 0.9% lock flush 10 milliLiter(s) IV Push every 1 hour PRN  sodium chloride 0.9%. 1000 milliLiter(s) IV Continuous <Continuous>  vasopressin Infusion 0.027 Unit(s)/Min IV Continuous <Continuous>      PHYSICAL EXAM:  T(C): 37 (11-28-22 @ 12:00), Max: 37 (11-27-22 @ 16:00)  HR: 72 (11-28-22 @ 14:00) (64 - 80)  BP: --  RR: 14 (11-28-22 @ 14:00) (10 - 21)  SpO2: 100% (11-28-22 @ 14:00) (100% - 100%)  Wt(kg): --    RASS -4, intubated/ventilated  neck supple, no JVD  Right fem arterial cannula (no anterograde stick)  Left fem venous cannula  heart RRR s1s2 no m   Lungs coarse breath sounds, good air entry b/l   Ext warm, trace edema, dopplerable pulses b/l    I&O's Summary    27 Nov 2022 07:01  -  28 Nov 2022 07:00  --------------------------------------------------------  IN: 2330 mL / OUT: 2810 mL / NET: -480 mL    28 Nov 2022 07:01  -  28 Nov 2022 14:26  --------------------------------------------------------  IN: 724.9 mL / OUT: 750 mL / NET: -25.1 mL        LABS:	 	                        8.5    13.88 )-----------( 102      ( 28 Nov 2022 00:57 )             26.0     11-28    152<H>  |  112<H>  |  63<H>  ----------------------------<  157<H>  4.0   |  25  |  3.95<H>    Ca    8.7      28 Nov 2022 00:58  Phos  6.5     11-28  Mg     2.4     11-28    TPro  6.1  /  Alb  3.2<L>  /  TBili  0.8  /  DBili  x   /  AST  228<H>  /  ALT  72<H>  /  AlkPhos  63  11-28    CARDIAC MARKERS ( 28 Nov 2022 00:58 )  x     / x     / 426 U/L / x     / 9.9 ng/mL  CARDIAC MARKERS ( 27 Nov 2022 00:33 )  x     / x     / 1659 U/L / x     / 43.3 ng/mL      11/25   Conclusions:  1. Tethered mitral valve leaflets with normal opening.  Mitral annular calcification. Mild mitral regurgitation.  2. Moderately dilated left atrium.  LA volume index = 42  cc/m2.  3. Moderate segmental left ventricular systolic  dysfunction. Endocardial visualization enhanced with  intravenous injection of Ultrasonic Enhancing Agent  (Definity). No left ventricular thrombus. LVEF calculated  using biplane Sequeira's method is 43%. Akinesis of the  mid-basal anterolateral wall, inferior and inferolateral  wall with all remaining walls hypokinetic.  4. Moderate diastolic dysfunction (Stage II).  5. Normal right atrium.  6. Normal right ventricular size and function.  7. Normal tricuspid valve. Minimal tricuspid regurgitation.  8. No pericardial effusion seen.

## 2022-11-29 DIAGNOSIS — E87.0 HYPEROSMOLALITY AND HYPERNATREMIA: ICD-10-CM

## 2022-11-29 LAB
ALBUMIN SERPL ELPH-MCNC: 3.3 G/DL — SIGNIFICANT CHANGE UP (ref 3.3–5)
ALP SERPL-CCNC: 67 U/L — SIGNIFICANT CHANGE UP (ref 40–120)
ALP SERPL-CCNC: 68 U/L — SIGNIFICANT CHANGE UP (ref 40–120)
ALT FLD-CCNC: 11 U/L — SIGNIFICANT CHANGE UP (ref 10–45)
ALT FLD-CCNC: 21 U/L — SIGNIFICANT CHANGE UP (ref 10–45)
AMYLASE P1 CFR SERPL: 128 U/L — HIGH (ref 25–125)
AMYLASE P1 CFR SERPL: 84 U/L — SIGNIFICANT CHANGE UP (ref 25–125)
ANION GAP SERPL CALC-SCNC: 11 MMOL/L — SIGNIFICANT CHANGE UP (ref 5–17)
ANION GAP SERPL CALC-SCNC: 13 MMOL/L — SIGNIFICANT CHANGE UP (ref 5–17)
APTT BLD: 31.8 SEC — SIGNIFICANT CHANGE UP (ref 27.5–35.5)
APTT BLD: 41 SEC — HIGH (ref 27.5–35.5)
APTT BLD: 47.6 SEC — HIGH (ref 27.5–35.5)
AST SERPL-CCNC: 54 U/L — HIGH (ref 10–40)
AST SERPL-CCNC: 86 U/L — HIGH (ref 10–40)
BASE EXCESS BLDA CALC-SCNC: -5.3 MMOL/L — LOW (ref -2–3)
BASE EXCESS BLDMV CALC-SCNC: -1.8 MMOL/L — SIGNIFICANT CHANGE UP (ref -3–3)
BASE EXCESS BLDMV CALC-SCNC: -2.9 MMOL/L — SIGNIFICANT CHANGE UP (ref -3–3)
BASE EXCESS BLDMV CALC-SCNC: -3.9 MMOL/L — LOW (ref -3–3)
BASE EXCESS BLDV CALC-SCNC: -4.5 MMOL/L — LOW (ref -2–3)
BASE EXCESS BLDV CALC-SCNC: -4.8 MMOL/L — LOW (ref -2–3)
BILIRUB DIRECT SERPL-MCNC: 0.2 MG/DL — SIGNIFICANT CHANGE UP (ref 0–0.3)
BILIRUB DIRECT SERPL-MCNC: 0.3 MG/DL — SIGNIFICANT CHANGE UP (ref 0–0.3)
BILIRUB INDIRECT FLD-MCNC: 0.5 MG/DL — SIGNIFICANT CHANGE UP (ref 0.2–1)
BILIRUB INDIRECT FLD-MCNC: 0.6 MG/DL — SIGNIFICANT CHANGE UP (ref 0.2–1)
BILIRUB SERPL-MCNC: 0.8 MG/DL — SIGNIFICANT CHANGE UP (ref 0.2–1.2)
BUN SERPL-MCNC: 68 MG/DL — HIGH (ref 7–23)
BUN SERPL-MCNC: 71 MG/DL — HIGH (ref 7–23)
CALCIUM SERPL-MCNC: 8.1 MG/DL — LOW (ref 8.4–10.5)
CALCIUM SERPL-MCNC: 8.4 MG/DL — SIGNIFICANT CHANGE UP (ref 8.4–10.5)
CHLORIDE SERPL-SCNC: 119 MMOL/L — HIGH (ref 96–108)
CHLORIDE SERPL-SCNC: 121 MMOL/L — HIGH (ref 96–108)
CK MB BLD-MCNC: 2.7 % — SIGNIFICANT CHANGE UP (ref 0–3.5)
CK MB CFR SERPL CALC: 4.3 NG/ML — SIGNIFICANT CHANGE UP (ref 0–6.7)
CK SERPL-CCNC: 162 U/L — SIGNIFICANT CHANGE UP (ref 30–200)
CO2 BLDA-SCNC: 20 MMOL/L — SIGNIFICANT CHANGE UP (ref 19–24)
CO2 BLDMV-SCNC: 23 MMOL/L — SIGNIFICANT CHANGE UP (ref 21–29)
CO2 BLDMV-SCNC: 24 MMOL/L — SIGNIFICANT CHANGE UP (ref 21–29)
CO2 BLDV-SCNC: 21 MMOL/L — LOW (ref 22–26)
CO2 BLDV-SCNC: 22 MMOL/L — SIGNIFICANT CHANGE UP (ref 22–26)
CO2 SERPL-SCNC: 20 MMOL/L — LOW (ref 22–31)
CO2 SERPL-SCNC: 21 MMOL/L — LOW (ref 22–31)
CREAT SERPL-MCNC: 3.2 MG/DL — HIGH (ref 0.5–1.3)
CREAT SERPL-MCNC: 3.45 MG/DL — HIGH (ref 0.5–1.3)
EGFR: 19 ML/MIN/1.73M2 — LOW
EGFR: 21 ML/MIN/1.73M2 — LOW
FIBRINOGEN PPP-MCNC: 671 MG/DL — HIGH (ref 200–445)
GAS PNL BLDA: SIGNIFICANT CHANGE UP
GAS PNL BLDMV: SIGNIFICANT CHANGE UP
GAS PNL BLDV: SIGNIFICANT CHANGE UP
GLUCOSE BLDC GLUCOMTR-MCNC: 110 MG/DL — HIGH (ref 70–99)
GLUCOSE BLDC GLUCOMTR-MCNC: 111 MG/DL — HIGH (ref 70–99)
GLUCOSE BLDC GLUCOMTR-MCNC: 113 MG/DL — HIGH (ref 70–99)
GLUCOSE BLDC GLUCOMTR-MCNC: 115 MG/DL — HIGH (ref 70–99)
GLUCOSE BLDC GLUCOMTR-MCNC: 125 MG/DL — HIGH (ref 70–99)
GLUCOSE BLDC GLUCOMTR-MCNC: 127 MG/DL — HIGH (ref 70–99)
GLUCOSE BLDC GLUCOMTR-MCNC: 130 MG/DL — HIGH (ref 70–99)
GLUCOSE BLDC GLUCOMTR-MCNC: 132 MG/DL — HIGH (ref 70–99)
GLUCOSE BLDC GLUCOMTR-MCNC: 133 MG/DL — HIGH (ref 70–99)
GLUCOSE BLDC GLUCOMTR-MCNC: 134 MG/DL — HIGH (ref 70–99)
GLUCOSE BLDC GLUCOMTR-MCNC: 138 MG/DL — HIGH (ref 70–99)
GLUCOSE BLDC GLUCOMTR-MCNC: 142 MG/DL — HIGH (ref 70–99)
GLUCOSE BLDC GLUCOMTR-MCNC: 143 MG/DL — HIGH (ref 70–99)
GLUCOSE BLDC GLUCOMTR-MCNC: 144 MG/DL — HIGH (ref 70–99)
GLUCOSE BLDC GLUCOMTR-MCNC: 147 MG/DL — HIGH (ref 70–99)
GLUCOSE BLDC GLUCOMTR-MCNC: 150 MG/DL — HIGH (ref 70–99)
GLUCOSE BLDC GLUCOMTR-MCNC: 161 MG/DL — HIGH (ref 70–99)
GLUCOSE BLDC GLUCOMTR-MCNC: 165 MG/DL — HIGH (ref 70–99)
GLUCOSE SERPL-MCNC: 130 MG/DL — HIGH (ref 70–99)
GLUCOSE SERPL-MCNC: 144 MG/DL — HIGH (ref 70–99)
HAPTOGLOB SERPL-MCNC: 135 MG/DL — SIGNIFICANT CHANGE UP (ref 34–200)
HCO3 BLDA-SCNC: 19 MMOL/L — LOW (ref 21–28)
HCO3 BLDMV-SCNC: 22 MMOL/L — SIGNIFICANT CHANGE UP (ref 20–28)
HCO3 BLDMV-SCNC: 23 MMOL/L — SIGNIFICANT CHANGE UP (ref 20–28)
HCO3 BLDV-SCNC: 20 MMOL/L — LOW (ref 22–29)
HCT VFR BLD CALC: 22.5 % — LOW (ref 39–50)
HCT VFR BLD CALC: 23.9 % — LOW (ref 39–50)
HCT VFR BLD CALC: 24.1 % — LOW (ref 39–50)
HCT VFR BLD CALC: 25.4 % — LOW (ref 39–50)
HCT VFR BLD CALC: 39.1 % — SIGNIFICANT CHANGE UP (ref 39–50)
HEPARIN-PF4 AB RESULT: <0.6 U/ML — SIGNIFICANT CHANGE UP (ref 0–0.9)
HGB BLD-MCNC: 12.8 G/DL — LOW (ref 13–17)
HGB BLD-MCNC: 7.7 G/DL — LOW (ref 13–17)
HGB BLD-MCNC: 7.8 G/DL — LOW (ref 13–17)
HGB BLD-MCNC: 8.5 G/DL — LOW (ref 13–17)
HOROWITZ INDEX BLDA+IHG-RTO: 50 — SIGNIFICANT CHANGE UP
HOROWITZ INDEX BLDMV+IHG-RTO: 100 — SIGNIFICANT CHANGE UP
HOROWITZ INDEX BLDV+IHG-RTO: 50 — SIGNIFICANT CHANGE UP
INR BLD: 1.01 RATIO — SIGNIFICANT CHANGE UP (ref 0.88–1.16)
LDH SERPL L TO P-CCNC: 650 U/L — HIGH (ref 50–242)
LIDOCAIN IGE QN: 167 U/L — HIGH (ref 7–60)
LIDOCAIN IGE QN: 62 U/L — HIGH (ref 7–60)
MAGNESIUM SERPL-MCNC: 2.5 MG/DL — SIGNIFICANT CHANGE UP (ref 1.6–2.6)
MCHC RBC-ENTMCNC: 28.6 PG — SIGNIFICANT CHANGE UP (ref 27–34)
MCHC RBC-ENTMCNC: 28.8 PG — SIGNIFICANT CHANGE UP (ref 27–34)
MCHC RBC-ENTMCNC: 28.9 PG — SIGNIFICANT CHANGE UP (ref 27–34)
MCHC RBC-ENTMCNC: 29.5 PG — SIGNIFICANT CHANGE UP (ref 27–34)
MCHC RBC-ENTMCNC: 29.6 PG — SIGNIFICANT CHANGE UP (ref 27–34)
MCHC RBC-ENTMCNC: 32.2 GM/DL — SIGNIFICANT CHANGE UP (ref 32–36)
MCHC RBC-ENTMCNC: 32.4 GM/DL — SIGNIFICANT CHANGE UP (ref 32–36)
MCHC RBC-ENTMCNC: 32.7 GM/DL — SIGNIFICANT CHANGE UP (ref 32–36)
MCHC RBC-ENTMCNC: 33.5 GM/DL — SIGNIFICANT CHANGE UP (ref 32–36)
MCHC RBC-ENTMCNC: 34.2 GM/DL — SIGNIFICANT CHANGE UP (ref 32–36)
MCV RBC AUTO: 86.2 FL — SIGNIFICANT CHANGE UP (ref 80–100)
MCV RBC AUTO: 88.3 FL — SIGNIFICANT CHANGE UP (ref 80–100)
MCV RBC AUTO: 88.5 FL — SIGNIFICANT CHANGE UP (ref 80–100)
MCV RBC AUTO: 88.8 FL — SIGNIFICANT CHANGE UP (ref 80–100)
MCV RBC AUTO: 88.9 FL — SIGNIFICANT CHANGE UP (ref 80–100)
NRBC # BLD: 0 /100 WBCS — SIGNIFICANT CHANGE UP (ref 0–0)
O2 CT VFR BLD CALC: 45 MMHG — SIGNIFICANT CHANGE UP (ref 30–65)
O2 CT VFR BLD CALC: 47 MMHG — SIGNIFICANT CHANGE UP (ref 30–65)
PCO2 BLDA: 33 MMHG — LOW (ref 35–48)
PCO2 BLDMV: 38 MMHG — SIGNIFICANT CHANGE UP (ref 30–65)
PCO2 BLDMV: 39 MMHG — SIGNIFICANT CHANGE UP (ref 30–65)
PCO2 BLDMV: 40 MMHG — SIGNIFICANT CHANGE UP (ref 30–65)
PCO2 BLDV: 36 MMHG — LOW (ref 42–55)
PCO2 BLDV: 38 MMHG — LOW (ref 42–55)
PF4 HEPARIN CMPLX AB SER-ACNC: NEGATIVE — SIGNIFICANT CHANGE UP
PH BLDA: 7.37 — SIGNIFICANT CHANGE UP (ref 7.35–7.45)
PH BLDMV: 7.34 — SIGNIFICANT CHANGE UP (ref 7.32–7.45)
PH BLDMV: 7.37 — SIGNIFICANT CHANGE UP (ref 7.32–7.45)
PH BLDMV: 7.38 — SIGNIFICANT CHANGE UP (ref 7.32–7.45)
PH BLDV: 7.34 — SIGNIFICANT CHANGE UP (ref 7.32–7.43)
PH BLDV: 7.36 — SIGNIFICANT CHANGE UP (ref 7.32–7.43)
PHOSPHATE SERPL-MCNC: 3.2 MG/DL — SIGNIFICANT CHANGE UP (ref 2.5–4.5)
PLATELET # BLD AUTO: 48 K/UL — LOW (ref 150–400)
PLATELET # BLD AUTO: 70 K/UL — LOW (ref 150–400)
PLATELET # BLD AUTO: 75 K/UL — LOW (ref 150–400)
PLATELET # BLD AUTO: 93 K/UL — LOW (ref 150–400)
PLATELET # BLD AUTO: 94 K/UL — LOW (ref 150–400)
PO2 BLDA: 83 MMHG — SIGNIFICANT CHANGE UP (ref 83–108)
PO2 BLDV: 45 MMHG — SIGNIFICANT CHANGE UP (ref 25–45)
PO2 BLDV: 48 MMHG — HIGH (ref 25–45)
POTASSIUM SERPL-MCNC: 3.5 MMOL/L — SIGNIFICANT CHANGE UP (ref 3.5–5.3)
POTASSIUM SERPL-MCNC: 3.8 MMOL/L — SIGNIFICANT CHANGE UP (ref 3.5–5.3)
POTASSIUM SERPL-SCNC: 3.5 MMOL/L — SIGNIFICANT CHANGE UP (ref 3.5–5.3)
POTASSIUM SERPL-SCNC: 3.8 MMOL/L — SIGNIFICANT CHANGE UP (ref 3.5–5.3)
PROT SERPL-MCNC: 5.8 G/DL — LOW (ref 6–8.3)
PROT SERPL-MCNC: 6 G/DL — SIGNIFICANT CHANGE UP (ref 6–8.3)
PROTHROM AB SERPL-ACNC: 11.6 SEC — SIGNIFICANT CHANGE UP (ref 10.5–13.4)
RBC # BLD: 2.61 M/UL — LOW (ref 4.2–5.8)
RBC # BLD: 2.69 M/UL — LOW (ref 4.2–5.8)
RBC # BLD: 2.71 M/UL — LOW (ref 4.2–5.8)
RBC # BLD: 2.87 M/UL — LOW (ref 4.2–5.8)
RBC # BLD: 4.43 M/UL — SIGNIFICANT CHANGE UP (ref 4.2–5.8)
RBC # FLD: 15.1 % — HIGH (ref 10.3–14.5)
RBC # FLD: 15.3 % — HIGH (ref 10.3–14.5)
RBC # FLD: 15.4 % — HIGH (ref 10.3–14.5)
SAO2 % BLDA: 95.3 % — SIGNIFICANT CHANGE UP (ref 94–98)
SAO2 % BLDMV: 75.9 — SIGNIFICANT CHANGE UP (ref 60–90)
SAO2 % BLDMV: 78.6 — SIGNIFICANT CHANGE UP (ref 60–90)
SAO2 % BLDMV: 79.4 — SIGNIFICANT CHANGE UP (ref 60–90)
SAO2 % BLDV: 75.7 % — SIGNIFICANT CHANGE UP (ref 67–88)
SAO2 % BLDV: 80.2 % — SIGNIFICANT CHANGE UP (ref 67–88)
SODIUM SERPL-SCNC: 152 MMOL/L — HIGH (ref 135–145)
SODIUM SERPL-SCNC: 153 MMOL/L — HIGH (ref 135–145)
TROPONIN T, HIGH SENSITIVITY RESULT: HIGH NG/L (ref 0–51)
WBC # BLD: 10.57 K/UL — HIGH (ref 3.8–10.5)
WBC # BLD: 13.01 K/UL — HIGH (ref 3.8–10.5)
WBC # BLD: 13.44 K/UL — HIGH (ref 3.8–10.5)
WBC # BLD: 15.07 K/UL — HIGH (ref 3.8–10.5)
WBC # BLD: 15.79 K/UL — HIGH (ref 3.8–10.5)
WBC # FLD AUTO: 10.57 K/UL — HIGH (ref 3.8–10.5)
WBC # FLD AUTO: 13.01 K/UL — HIGH (ref 3.8–10.5)
WBC # FLD AUTO: 13.44 K/UL — HIGH (ref 3.8–10.5)
WBC # FLD AUTO: 15.07 K/UL — HIGH (ref 3.8–10.5)
WBC # FLD AUTO: 15.79 K/UL — HIGH (ref 3.8–10.5)

## 2022-11-29 PROCEDURE — 99232 SBSQ HOSP IP/OBS MODERATE 35: CPT

## 2022-11-29 PROCEDURE — 99291 CRITICAL CARE FIRST HOUR: CPT

## 2022-11-29 PROCEDURE — 71045 X-RAY EXAM CHEST 1 VIEW: CPT | Mod: 26

## 2022-11-29 PROCEDURE — 99223 1ST HOSP IP/OBS HIGH 75: CPT

## 2022-11-29 PROCEDURE — 99233 SBSQ HOSP IP/OBS HIGH 50: CPT | Mod: GC

## 2022-11-29 RX ORDER — MAGNESIUM SULFATE 500 MG/ML
1 VIAL (ML) INJECTION ONCE
Refills: 0 | Status: COMPLETED | OUTPATIENT
Start: 2022-11-29 | End: 2022-11-29

## 2022-11-29 RX ORDER — POTASSIUM CHLORIDE 20 MEQ
10 PACKET (EA) ORAL
Refills: 0 | Status: COMPLETED | OUTPATIENT
Start: 2022-11-29 | End: 2022-11-29

## 2022-11-29 RX ORDER — MILRINONE LACTATE 1 MG/ML
0.25 INJECTION, SOLUTION INTRAVENOUS
Qty: 20 | Refills: 0 | Status: DISCONTINUED | OUTPATIENT
Start: 2022-11-29 | End: 2022-11-29

## 2022-11-29 RX ORDER — FUROSEMIDE 40 MG
20 TABLET ORAL ONCE
Refills: 0 | Status: COMPLETED | OUTPATIENT
Start: 2022-11-29 | End: 2022-11-29

## 2022-11-29 RX ORDER — ARGATROBAN 50 MG/50ML
0.13 INJECTION, SOLUTION INTRAVENOUS
Qty: 50 | Refills: 0 | Status: DISCONTINUED | OUTPATIENT
Start: 2022-11-29 | End: 2022-12-08

## 2022-11-29 RX ORDER — MAGNESIUM SULFATE 500 MG/ML
2 VIAL (ML) INJECTION ONCE
Refills: 0 | Status: COMPLETED | OUTPATIENT
Start: 2022-11-29 | End: 2022-11-29

## 2022-11-29 RX ORDER — ALBUMIN HUMAN 25 %
100 VIAL (ML) INTRAVENOUS ONCE
Refills: 0 | Status: COMPLETED | OUTPATIENT
Start: 2022-11-29 | End: 2022-11-29

## 2022-11-29 RX ORDER — CHLORHEXIDINE GLUCONATE 213 G/1000ML
1 SOLUTION TOPICAL
Refills: 0 | Status: DISCONTINUED | OUTPATIENT
Start: 2022-11-29 | End: 2022-12-08

## 2022-11-29 RX ORDER — ATORVASTATIN CALCIUM 80 MG/1
40 TABLET, FILM COATED ORAL AT BEDTIME
Refills: 0 | Status: DISCONTINUED | OUTPATIENT
Start: 2022-11-29 | End: 2022-12-08

## 2022-11-29 RX ORDER — ARGATROBAN 50 MG/50ML
0.3 INJECTION, SOLUTION INTRAVENOUS
Qty: 50 | Refills: 0 | Status: DISCONTINUED | OUTPATIENT
Start: 2022-11-29 | End: 2022-11-29

## 2022-11-29 RX ORDER — POTASSIUM CHLORIDE 20 MEQ
40 PACKET (EA) ORAL ONCE
Refills: 0 | Status: COMPLETED | OUTPATIENT
Start: 2022-11-29 | End: 2022-11-29

## 2022-11-29 RX ORDER — SODIUM BICARBONATE 1 MEQ/ML
50 SYRINGE (ML) INTRAVENOUS ONCE
Refills: 0 | Status: COMPLETED | OUTPATIENT
Start: 2022-11-29 | End: 2022-11-29

## 2022-11-29 RX ORDER — ALBUMIN HUMAN 25 %
250 VIAL (ML) INTRAVENOUS ONCE
Refills: 0 | Status: DISCONTINUED | OUTPATIENT
Start: 2022-11-29 | End: 2022-11-29

## 2022-11-29 RX ADMIN — Medication 50 MILLIEQUIVALENT(S): at 09:27

## 2022-11-29 RX ADMIN — INSULIN HUMAN 6 UNIT(S)/HR: 100 INJECTION, SOLUTION SUBCUTANEOUS at 07:17

## 2022-11-29 RX ADMIN — Medication 50 MILLIEQUIVALENT(S): at 18:10

## 2022-11-29 RX ADMIN — CHLORHEXIDINE GLUCONATE 1 APPLICATION(S): 213 SOLUTION TOPICAL at 05:28

## 2022-11-29 RX ADMIN — Medication 1 DROP(S): at 05:27

## 2022-11-29 RX ADMIN — DEXMEDETOMIDINE HYDROCHLORIDE IN 0.9% SODIUM CHLORIDE 34.8 MICROGRAM(S)/KG/HR: 4 INJECTION INTRAVENOUS at 07:17

## 2022-11-29 RX ADMIN — SENNA PLUS 2 TABLET(S): 8.6 TABLET ORAL at 21:02

## 2022-11-29 RX ADMIN — Medication 25 MILLIGRAM(S): at 12:41

## 2022-11-29 RX ADMIN — SODIUM CHLORIDE 10 MILLILITER(S): 9 INJECTION INTRAMUSCULAR; INTRAVENOUS; SUBCUTANEOUS at 19:42

## 2022-11-29 RX ADMIN — Medication 25 MILLIGRAM(S): at 05:27

## 2022-11-29 RX ADMIN — MILRINONE LACTATE 6.97 MICROGRAM(S)/KG/MIN: 1 INJECTION, SOLUTION INTRAVENOUS at 07:18

## 2022-11-29 RX ADMIN — ARGATROBAN 0.3 MICROGRAM(S)/KG/MIN: 50 INJECTION, SOLUTION INTRAVENOUS at 19:42

## 2022-11-29 RX ADMIN — Medication 50 MILLIEQUIVALENT(S): at 12:14

## 2022-11-29 RX ADMIN — DEXMEDETOMIDINE HYDROCHLORIDE IN 0.9% SODIUM CHLORIDE 34.8 MICROGRAM(S)/KG/HR: 4 INJECTION INTRAVENOUS at 19:41

## 2022-11-29 RX ADMIN — Medication 20 MILLIGRAM(S): at 10:17

## 2022-11-29 RX ADMIN — Medication 50 MILLIEQUIVALENT(S): at 18:05

## 2022-11-29 RX ADMIN — Medication 50 MILLIEQUIVALENT(S): at 02:15

## 2022-11-29 RX ADMIN — Medication 100 MILLILITER(S): at 03:26

## 2022-11-29 RX ADMIN — Medication 50 MILLIEQUIVALENT(S): at 08:58

## 2022-11-29 RX ADMIN — MEROPENEM 100 MILLIGRAM(S): 1 INJECTION INTRAVENOUS at 05:28

## 2022-11-29 RX ADMIN — Medication 1 DROP(S): at 17:31

## 2022-11-29 RX ADMIN — CHLORHEXIDINE GLUCONATE 15 MILLILITER(S): 213 SOLUTION TOPICAL at 17:30

## 2022-11-29 RX ADMIN — CHLORHEXIDINE GLUCONATE 15 MILLILITER(S): 213 SOLUTION TOPICAL at 05:27

## 2022-11-29 RX ADMIN — Medication 50 MILLIEQUIVALENT(S): at 17:46

## 2022-11-29 RX ADMIN — Medication 50 MILLIEQUIVALENT(S): at 01:00

## 2022-11-29 RX ADMIN — SODIUM CHLORIDE 10 MILLILITER(S): 9 INJECTION INTRAMUSCULAR; INTRAVENOUS; SUBCUTANEOUS at 07:19

## 2022-11-29 RX ADMIN — CHLORHEXIDINE GLUCONATE 1 APPLICATION(S): 213 SOLUTION TOPICAL at 05:14

## 2022-11-29 RX ADMIN — MEROPENEM 100 MILLIGRAM(S): 1 INJECTION INTRAVENOUS at 17:31

## 2022-11-29 RX ADMIN — Medication 50 MILLIEQUIVALENT(S): at 01:50

## 2022-11-29 RX ADMIN — PANTOPRAZOLE SODIUM 40 MILLIGRAM(S): 20 TABLET, DELAYED RELEASE ORAL at 17:31

## 2022-11-29 RX ADMIN — Medication 25 GRAM(S): at 19:41

## 2022-11-29 RX ADMIN — PANTOPRAZOLE SODIUM 40 MILLIGRAM(S): 20 TABLET, DELAYED RELEASE ORAL at 05:29

## 2022-11-29 RX ADMIN — ARGATROBAN 0.2 MICROGRAM(S)/KG/MIN: 50 INJECTION, SOLUTION INTRAVENOUS at 10:19

## 2022-11-29 RX ADMIN — Medication 100 GRAM(S): at 09:12

## 2022-11-29 RX ADMIN — Medication 1 DROP(S): at 23:10

## 2022-11-29 RX ADMIN — ATORVASTATIN CALCIUM 40 MILLIGRAM(S): 80 TABLET, FILM COATED ORAL at 21:02

## 2022-11-29 RX ADMIN — Medication 50 MILLIEQUIVALENT(S): at 11:30

## 2022-11-29 RX ADMIN — POLYETHYLENE GLYCOL 3350 17 GRAM(S): 17 POWDER, FOR SOLUTION ORAL at 11:56

## 2022-11-29 RX ADMIN — Medication 1 DROP(S): at 00:19

## 2022-11-29 RX ADMIN — INSULIN HUMAN 6 UNIT(S)/HR: 100 INJECTION, SOLUTION SUBCUTANEOUS at 19:41

## 2022-11-29 RX ADMIN — Medication 25 MILLIGRAM(S): at 21:02

## 2022-11-29 RX ADMIN — Medication 1 DROP(S): at 11:56

## 2022-11-29 RX ADMIN — Medication 40 MILLIEQUIVALENT(S): at 19:41

## 2022-11-29 NOTE — GOALS OF CARE CONVERSATION - ADVANCED CARE PLANNING - STAFF/PROVIDER
Dr. Mitchell of [facility] [place], Dr. Holbrook of [facility] Dr. Mitchell of hospitals, Dr. Holbrook of [facility] Dr. Mitchell of \[facility]\"", Dr. Holbrook of [facility]

## 2022-11-29 NOTE — PROGRESS NOTE ADULT - ASSESSMENT
62 year old admitted with pancreatitis  not necrotizing on the CT  scan and cholecystitis .  pt has sent to the SICU but developed respiratory  failure presumed to be due to ARDS also with hs of CAD<CABG<DM and possible MI  Movedto the CTU for echo      Fever  not on CAVHD  cultures are negative and ct looks like ARDS.  no fever for the last 48 yrs  legs are viable    ARDS  likely all problems related to pancreatitis .  agree with short 7 8 day course of meropenem despite the lack of necrosis on his ct scan  no need for vanco     CHF  on ecmo

## 2022-11-29 NOTE — PROGRESS NOTE ADULT - PROBLEM SELECTOR PLAN 1
ERIC/ ATN in the setting of STEMI, cardiac arrest & cardiogenic shock      SCr on arrival was 2.15; trended down to hector 1.6 on 11/25;  slowly trended up & peaked to 3.95 11/28; now trending down to 3.45 today. Pt is non oliguric with UOP 3.3L in 24hrs with net even fluid balance.  Last lasix dose was 2 days ago.     UA with moderate blood & few RBCs, few WBCs, mild proteinuria, high sp gravity. Please check CPK, urine electrolytes, spot urine TP/CR. Would check Renal US when stable. Maintain De Dios. Agree with inotropic support. Intermittent diuresis as needed.  Continue to optimize hemodynamics. No indication for dialysis at this time. Monitor labs and urine output. Avoid NSAIDs, RCA and nephrotoxins. Dose medications as per eGFR.        If you have any questions, please feel free to contact me via [employer] teams  Marielle Santos  Nephrology Fellow   Pager NS: [phone]/ [facility]: [phone]    (After 5 pm or on weekends please page the on-call fellow, can check [email or web address] for schedule. Login is carmen cantor, schedule under [facility] medicine, psych, derm). ERIC/ ATN in the setting of STEMI, cardiac arrest & cardiogenic shock      SCr on arrival was 2.15; trended down to hector 1.6 on 11/25;  slowly trended up & peaked to 3.95 11/28; now trending down to 3.45 today. Pt is non oliguric with UOP 3.3L in 24hrs with net even fluid balance.  Last lasix dose was 2 days ago.     UA with moderate blood & few RBCs, few WBCs, mild proteinuria, high sp gravity. Please check CPK, urine electrolytes, spot urine TP/CR. Would check Renal US when stable. Maintain De Dios. Agree with inotropic support. Intermittent diuresis as needed.  Continue to optimize hemodynamics. No indication for dialysis at this time. Monitor labs and urine output. Avoid NSAIDs, RCA and nephrotoxins. Dose medications as per eGFR.        If you have any questions, please feel free to contact me via [employer] teams  Marielle Santos  Nephrology Fellow   Pager NS: [phone]/ [facility]: [phone]    (After 5 pm or on weekends please page the on-call fellow, can check [email or web address] for schedule. Login is carmen cantor, schedule under [facility] medicine, psych, derm). ERIC/ ATN in the setting of STEMI, cardiac arrest & cardiogenic shock      SCr on arrival was 2.15; trended down to hector 1.6 on 11/25;  slowly trended up & peaked to 3.95 11/28; now trending down to 3.45 today. Pt is non oliguric with UOP 3.3L in 24hrs with net even fluid balance.  Last lasix dose was 2 days ago.     UA with moderate blood & few RBCs, few WBCs, mild proteinuria, high sp gravity. Please check CPK, urine electrolytes, spot urine TP/CR. Would check Renal US when stable. Maintain De Dios. Agree with inotropic support. Intermittent diuresis as needed.  Continue to optimize hemodynamics. No indication for dialysis at this time. Monitor labs and urine output. Avoid NSAIDs, RCA and nephrotoxins. Dose medications as per eGFR.        If you have any questions, please feel free to contact me via [employer] teams  Marielle Santos  Nephrology Fellow   Pager NS: [phone]/ [facility]: [phone]    (After 5 pm or on weekends please page the on-call fellow, can check [email or web address] for schedule. Login is carmen cantor, schedule under [facility] medicine, psych, derm). ERIC/ ATN in the setting of STEMI, cardiac arrest & cardiogenic shock      SCr on arrival was 2.15; trended down to hector 1.6 on 11/25;  slowly trended up & peaked to 3.95 11/28; now trending down to 3.45 today. Pt is non oliguric with UOP 3.3L in 24hrs with net even fluid balance.  Last lasix dose was 2 days ago.     UA with moderate blood & few RBCs, few WBCs, mild proteinuria, high sp gravity. Please check CPK, urine electrolytes, spot urine TP/CR. Would check Renal US when stable. Maintain De Dios. Agree with inotropic support.  Continue to optimize hemodynamics. No indication for dialysis at this time. Monitor labs and urine output. Avoid NSAIDs, RCA and nephrotoxins. Dose medications as per eGFR.

## 2022-11-29 NOTE — PROGRESS NOTE ADULT - ASSESSMENT
61 YO M with a history of CAD s/p 4v CABG ~2019 in [facility], DM2 (A1c 7.3%), and HTN who initially presented to OSH with abdominal pain and n/v and found to have pancreatitis with lipase > 30,000 though also with elevated troponins. CT abdomen revealed acute pancreatitis and his initial LVEF was 40-45%. He developed MSOF with hypoxic respiratory failure requiring intubation and ERIC and went into hypoxic PEA arrest requiring ACLS and due to persistent hypoxia and hypotension on pressors after ROSC was cannulated to [facility] (LFV, RFA, no anterograde perfusion catheter) on 11/25. Notably CTH that day revealed small subdural hemorrhage.     His post arrest TTE on 11/26 showed a signficantly worse LVEF of 5-10% with an AV that was only minimally opening. Followup TTE on 11/28 continues to show some LV dysfunction but some mild qualitative improvement. He reassuringly has 40 mmHg pulsatility despite full ECMO support. There is likely some degree of stunning post arrest and will continue to slowly wean ECMO and assess for recovery. Will plan for LHC and possible IABP for venting pending improvement in renal function 63 YO M with a history of CAD s/p 4v CABG ~2019 in [facility], DM2 (A1c 7.3%), and HTN who initially presented to OSH with abdominal pain and n/v and found to have pancreatitis with lipase > 30,000 though also with elevated troponins. CT abdomen revealed acute pancreatitis and his initial LVEF was 40-45%. He developed MSOF with hypoxic respiratory failure requiring intubation and ERIC and went into hypoxic PEA arrest requiring ACLS and due to persistent hypoxia and hypotension on pressors after ROSC was cannulated to [facility] ECMO (LFV, RFA, no anterograde perfusion catheter) on 11/25. Notably CTH that day revealed small subdural hemorrhage.     His post arrest TTE on 11/26 showed a signficantly worse LVEF of 5-10% with an AV that was only minimally opening. Followup TTE on 11/28 continues to show some LV dysfunction but some mild qualitative improvement. He reassuringly has 40 mmHg pulsatility despite full ECMO support. There is likely some degree of stunning post arrest and will continue to slowly wean ECMO and assess for recovery. Will plan for LHC and possible IABP for venting pending improvement in renal function 63 YO M with a history of CAD s/p 4v CABG ~2019 in [facility], DM2 (A1c 7.3%), and HTN who initially presented to OSH with abdominal pain and n/v and found to have pancreatitis with lipase > 30,000 though also with elevated troponins. CT abdomen revealed acute pancreatitis and his initial LVEF was 40-45%. He developed MSOF with hypoxic respiratory failure requiring intubation and ERIC and went into hypoxic PEA arrest requiring ACLS and due to persistent hypoxia and hypotension on pressors after ROSC was cannulated to [facility] ECMO (LFV, RFA, no anterograde perfusion catheter) on 11/25. Notably CTH that day revealed small subdural hemorrhage.     His post arrest TTE on 11/26 showed a signficantly worse LVEF of 5-10% with an AV that was only minimally opening. Followup TTE on 11/28 continues to show some LV dysfunction but some mild qualitative improvement. He reassuringly has 40 mmHg pulsatility despite full ECMO support. There is likely some degree of stunning post arrest and will continue to slowly wean ECMO and assess for recovery. Will plan for LHC and possible IABP for venting pending improvement in renal function 63 YO M with a history of CAD s/p 4v CABG ~2019 in [facility], DM2 (A1c 7.3%), and HTN who initially presented to OSH with abdominal pain and n/v and found to have pancreatitis with lipase > 3000 though also with elevated troponins. CT abdomen revealed acute pancreatitis and his initial LVEF was 40-45%. He developed MSOF with hypoxic respiratory failure requiring intubation and ERIC and went into hypoxic PEA arrest requiring ACLS and due to persistent hypoxia and hypotension on pressors after ROSC was cannulated to [facility] ECMO (LFV, RFA, no anterograde perfusion catheter) on 11/25. Notably CTH that day revealed small subdural hemorrhage.     His post arrest TTE on 11/26 showed a signficantly worse LVEF of 5-10% with an AV that was only minimally opening. Followup TTE on 11/28 continues to show significant LV dysfunction but some mild qualitative improvement. He reassuringly has ? 40 mmHg pulsatility despite full ECMO support. There is likely some degree of stunning post arrest and will continue to slowly wean ECMO and assess for recovery. Will plan for LHC and possible IABP for venting (if not tolerating continued ECMO weans) pending improvement in renal function.     63 YO M with a history of CAD s/p 4v CABG ~2019 in [facility], DM2 (A1c 7.3%), and HTN who initially presented to OSH with abdominal pain and n/v and found to have pancreatitis with lipase > 3000 though also with elevated troponins. CT abdomen revealed acute pancreatitis and his initial LVEF was 40-45%. He developed MSOF with hypoxic respiratory failure requiring intubation and ERIC and went into hypoxic PEA arrest requiring ACLS and due to persistent hypoxia and hypotension on pressors after ROSC was cannulated to [facility] ECMO (LFV, RFA, no anterograde perfusion catheter) on 11/25. Notably CTH that day revealed small subdural hemorrhage.     His post arrest TTE on 11/26 showed a signficantly worse LVEF of 5-10% with an AV that was only minimally opening. Followup TTE on 11/28 continues to show significant LV dysfunction but some mild qualitative improvement. He reassuringly has ? 40 mmHg pulsatility despite full ECMO support. There is likely some degree of stunning post arrest and will continue to slowly wean ECMO and assess for recovery. Will plan for LHC and possible IABP for venting (if not tolerating continued ECMO weans) pending improvement in renal function.     61 YO M with a history of CAD s/p 4v CABG ~2019 in [facility], DM2 (A1c 7.3%), and HTN who initially presented to OSH with abdominal pain and n/v and found to have pancreatitis with lipase > 3000 though also with elevated troponins. CT abdomen revealed acute pancreatitis and his initial LVEF was 40-45%. He developed MSOF with hypoxic respiratory failure requiring intubation and ERIC and went into hypoxic PEA arrest requiring ACLS and due to persistent hypoxia and hypotension on pressors after ROSC was cannulated to [facility] ECMO (LFV, RFA, no anterograde perfusion catheter) on 11/25. Notably CTH that day revealed small subdural hemorrhage.     His post arrest TTE on 11/26 showed a signficantly worse LVEF of 5-10% with an AV that was only minimally opening. Followup TTE on 11/28 continues to show significant LV dysfunction but some mild qualitative improvement. He reassuringly has ? 40 mmHg pulsatility despite full ECMO support. There is likely some degree of stunning post arrest and will continue to slowly wean ECMO and assess for recovery. Will plan for LHC and possible IABP for venting (if not tolerating continued ECMO weans) pending improvement in renal function.

## 2022-11-29 NOTE — PROGRESS NOTE ADULT - PROBLEM SELECTOR PLAN 4
- CT abd showing acute pancreatitis  - lipase > 3000 11/24, now normalized to 40  - conservative care  - appreciate GI recs, possible ERCP once stable.  - on emperic meropenem

## 2022-11-29 NOTE — PROGRESS NOTE ADULT - PROBLEM SELECTOR PLAN 3
-  rt radial ABG paO2 80, likely d/t mixing, consider increasing vent FiO2   - CXR showing some improvement  - c/w ECMO , adjust sweep and FdO2 according to ABG  - extubate when able  - check left radial ABG

## 2022-11-29 NOTE — PROGRESS NOTE ADULT - SUBJECTIVE AND OBJECTIVE BOX
Patient seen and examined at the bedside.    Remained critically ill on continuous ICU monitoring.    OBJECTIVE:  Vital Signs Last 24 Hrs  T(C): 37 (29 Nov 2022 08:00), Max: 37 (28 Nov 2022 12:00)  T(F): 98.6 (29 Nov 2022 08:00), Max: 98.6 (28 Nov 2022 12:00)  HR: 79 (29 Nov 2022 08:45) (68 - 79)  BP: --  BP(mean): --  RR: 21 (29 Nov 2022 08:45) (14 - 23)  SpO2: 99% (29 Nov 2022 08:45) (97% - 100%)    Parameters below as of 29 Nov 2022 08:30  O2 Concentration (%): 50      Physical Exam:   General: Intubated, multiple lines gtt  Neurology: sedated > on sedation vacation woke up and followed commands  Eyes: bilateral pupils equal and reactive   ENT/Neck: +ETT midline, Neck supple, trachea midline, No JVD   Respiratory: Clear bilaterally   CV: S1S2, no murmurs        [x] Sinus rhythm  Abdominal: Soft, NT, ND +BS   Extremities: 1-2+ pedal edema noted, + peripheral pulses   Skin: No Rashes, Hematoma, Ecchymosis           ============================I/O===========================   I&O's Detail    28 Nov 2022 07:01  -  29 Nov 2022 07:00  --------------------------------------------------------  IN:    Albumin 5% - 50 mL: 100 mL    Dexmedetomidine: 594.3 mL    Free Water: 800 mL    Heparin: 252 mL    Insulin: 78 mL    IV PiggyBack: 800 mL    Milrinone: 28 mL    PRBCs (Packed Red Blood Cells): 300 mL    sodium chloride 0.9%: 220 mL    Vital1.5: 210 mL  Total IN: 3382.3 mL    OUT:    Indwelling Catheter - Urethral (mL): 3340 mL    NiCARdipine: 0 mL    Vasopressin: 0 mL  Total OUT: 3340 mL    Total NET: 42.3 mL      29 Nov 2022 07:01  -  29 Nov 2022 09:02  --------------------------------------------------------  IN:    Dexmedetomidine: 55.8 mL    Heparin: 22 mL    Insulin: 6 mL    IV PiggyBack: 100 mL    IV PiggyBack: 50 mL    Milrinone: 9.9 mL    sodium chloride 0.9%: 10 mL    Vital1.5: 20 mL  Total IN: 273.7 mL    OUT:    Indwelling Catheter - Urethral (mL): 250 mL    NiCARdipine: 0 mL  Total OUT: 250 mL    Total NET: 23.7 mL  ============================ LABS =========================                        8.5    15.07 )-----------( 70       ( 29 Nov 2022 03:01 )             25.4     11-29    153<H>  |  119<H>  |  68<H>  ----------------------------<  130<H>  3.5   |  21<L>  |  3.45<H>    Ca    8.4      29 Nov 2022 00:26  Phos  3.2     11-29  Mg     2.5     11-29    TPro  6.1  /  Alb  3.2<L>  /  TBili  0.8  /  DBili  x   /  AST  228<H>  /  ALT  72<H>  /  AlkPhos  63  11-28    LIVER FUNCTIONS - ( 28 Nov 2022 00:58 )  Alb: 3.2 g/dL / Pro: 6.1 g/dL / ALK PHOS: 63 U/L / ALT: 72 U/L / AST: 228 U/L / GGT: x           PT/INR - ( 29 Nov 2022 00:24 )   PT: 11.6 sec;   INR: 1.01 ratio         PTT - ( 29 Nov 2022 00:24 )  PTT:41.0 sec  ABG - ( 29 Nov 2022 06:52 )  pH, Arterial: 7.38  pH, Blood: x     /  pCO2: 36    /  pO2: 71    / HCO3: 21    / Base Excess: -3.4  /  SaO2: 93.9      ======================Micro/Rad/Cardio=================  Culture: Reviewed   CXR: Reviewed  Echo: Reviewed  ======================================================  PAST MEDICAL & SURGICAL HISTORY:    ======================================================  Assessment:  63 y/o male with PMH of CABG, DM, HTN, HLD presenting as transfer from [place] for c/f STEMI. PTA arrival received 325 aspirin, 600 plavix, and no heparin drip started. Around 1:30 am patient had multiple episodes of non-bloody diarrhea and emesis with associated abdominal pain and chest pain, unable to localize, non-radiating, with associated SOB and no associated palpitations or diaphoresis. No recent fevers/chills, cough, rashes, or changes in urination. Does report mild diffuse back pain; started 5 days ago in setting on injury while walking and lifting objects. Denies focal weakness, numbness/tingling, or LOC due does report mild dizziness.    Cardiac arrest s/p [facility] ECMO 11/25  Hemodynamic instability  Hypovolemia  Post op respiratory insufficiency  Thrombocytopenia  Acute blood loss anemia  Leukocytosis   ======================================================  Plan:   ***Neuro***  CT head showed 4mm subdural hematoma 11/26: repeat CT head unchanged   [x] Sedated with [x] Precedex   Post operative neuro assessment     ***Cardiovascular***  Invasive hemodynamic monitoring, assess perfusion indices   SR / CVP 4/ MAP 73/ PAP 19/ CI 2/ Hct 26.0/ Lactate 0.8  [x] Cardene - 2mg/hr [x] Primacor- 0.25mcgs  [x] ECMO- 3600 rpms, 3.9 flow  Reassessment of hemodynamics post resuscitation   [x] AC Therapy with Heparin gtt, PTT 40 -50 goal, no higher d/t SDH  Went into Bradycardic and Hypoxic arrest 11/25  Serial EKG and cardiac enzymes     ***Pulmonary***  [x]  Nitric oxide- 20 ppm   Post op vent management   Titration of FiO2 and PEEP, follow SpO2, CXR, blood gasses   100% FiO2 on ECMO  Titrate sweep for pco2 and ph    Mode: AC/ CMV (Assist Control/ Continuous Mandatory Ventilation)  RR (machine): 14  FiO2: 40  PEEP: 10  ITime: 1  MAP: 14  PC: 10  PIP: 22              ***GI***  [x] Tolerating TF Vital AF, @ goal 10cc/hr.   [x] Protonix    Bowel regimen with Miralax and senna     ***Renal***  Continue to monitor I/Os, BUN/Creatinine.   Replete lytes PRN  Va present    ***ID***  Meropenem for empiric coverage  Follow up cultures    ***Endocrine***  [x]  DM2: HbA1c 7.3%                - [x] Insulin gtt [x] ISS             - Need tight glycemic control to prevent wound infection.        Patient requires continuous monitoring with:  bedside rhythm monitoring, arterial line, pulse oximetry, ventilator management and monitoring; intermittent blood gas analysis. Care plan discussed with ICU care team.  patient remain critical; required more than usual post op care; I have spent 35 minutes providing non routine post op care, revaluated multiple times during the day .     Care Plan discussed with the ICU care team. Patient remained critical, at risk for life threatening decompensation.     By signing my name below, I, Rosio Alonzo, attest that this documentation has been prepared under the direction and in the presence of Yusef Millan MD.  Electronically signed: Brian Burnett, 11-29-22 @ 09:02    IMonty, personally performed the services described in this documentation. all medical record entries made by the scribe were at my direction and in my presence. I have reviewed the chart and agree that the record reflects my personal performance and is accurate and complete  Electronically signed: Yusef Millan MD. Patient seen and examined at the bedside.    Remained critically ill on continuous ICU monitoring.    OBJECTIVE:  Vital Signs Last 24 Hrs  T(C): 37 (29 Nov 2022 08:00), Max: 37 (28 Nov 2022 12:00)  T(F): 98.6 (29 Nov 2022 08:00), Max: 98.6 (28 Nov 2022 12:00)  HR: 79 (29 Nov 2022 08:45) (68 - 79)  BP: --  BP(mean): --  RR: 21 (29 Nov 2022 08:45) (14 - 23)  SpO2: 99% (29 Nov 2022 08:45) (97% - 100%)    Parameters below as of 29 Nov 2022 08:30  O2 Concentration (%): 50      Physical Exam:   General: Intubated, multiple lines gtt  Neurology: sedated > on sedation vacation woke up and followed commands  Eyes: bilateral pupils equal and reactive   ENT/Neck: +ETT midline, Neck supple, trachea midline, No JVD   Respiratory: Clear bilaterally   CV: S1S2, no murmurs        [x] Sinus rhythm  Abdominal: Soft, NT, ND +BS   Extremities: 1-2+ pedal edema noted, + peripheral pulses   Skin: No Rashes, Hematoma, Ecchymosis           ============================I/O===========================   I&O's Detail    28 Nov 2022 07:01  -  29 Nov 2022 07:00  --------------------------------------------------------  IN:    Albumin 5% - 50 mL: 100 mL    Dexmedetomidine: 594.3 mL    Free Water: 800 mL    Heparin: 252 mL    Insulin: 78 mL    IV PiggyBack: 800 mL    Milrinone: 28 mL    PRBCs (Packed Red Blood Cells): 300 mL    sodium chloride 0.9%: 220 mL    Vital1.5: 210 mL  Total IN: 3382.3 mL    OUT:    Indwelling Catheter - Urethral (mL): 3340 mL    NiCARdipine: 0 mL    Vasopressin: 0 mL  Total OUT: 3340 mL    Total NET: 42.3 mL      29 Nov 2022 07:01  -  29 Nov 2022 09:02  --------------------------------------------------------  IN:    Dexmedetomidine: 55.8 mL    Heparin: 22 mL    Insulin: 6 mL    IV PiggyBack: 100 mL    IV PiggyBack: 50 mL    Milrinone: 9.9 mL    sodium chloride 0.9%: 10 mL    Vital1.5: 20 mL  Total IN: 273.7 mL    OUT:    Indwelling Catheter - Urethral (mL): 250 mL    NiCARdipine: 0 mL  Total OUT: 250 mL    Total NET: 23.7 mL  ============================ LABS =========================                        8.5    15.07 )-----------( 70       ( 29 Nov 2022 03:01 )             25.4     11-29    153<H>  |  119<H>  |  68<H>  ----------------------------<  130<H>  3.5   |  21<L>  |  3.45<H>    Ca    8.4      29 Nov 2022 00:26  Phos  3.2     11-29  Mg     2.5     11-29    TPro  6.1  /  Alb  3.2<L>  /  TBili  0.8  /  DBili  x   /  AST  228<H>  /  ALT  72<H>  /  AlkPhos  63  11-28    LIVER FUNCTIONS - ( 28 Nov 2022 00:58 )  Alb: 3.2 g/dL / Pro: 6.1 g/dL / ALK PHOS: 63 U/L / ALT: 72 U/L / AST: 228 U/L / GGT: x           PT/INR - ( 29 Nov 2022 00:24 )   PT: 11.6 sec;   INR: 1.01 ratio         PTT - ( 29 Nov 2022 00:24 )  PTT:41.0 sec  ABG - ( 29 Nov 2022 06:52 )  pH, Arterial: 7.38  pH, Blood: x     /  pCO2: 36    /  pO2: 71    / HCO3: 21    / Base Excess: -3.4  /  SaO2: 93.9      ======================Micro/Rad/Cardio=================  Culture: Reviewed   CXR: Reviewed  Echo: Reviewed  ======================================================  PAST MEDICAL & SURGICAL HISTORY:    ======================================================  Assessment:  61 y/o male with PMH of CABG, DM, HTN, HLD presenting as transfer from [place] for c/f STEMI. PTA arrival received 325 aspirin, 600 plavix, and no heparin drip started. Around 1:30 am patient had multiple episodes of non-bloody diarrhea and emesis with associated abdominal pain and chest pain, unable to localize, non-radiating, with associated SOB and no associated palpitations or diaphoresis. No recent fevers/chills, cough, rashes, or changes in urination. Does report mild diffuse back pain; started 5 days ago in setting on injury while walking and lifting objects. Denies focal weakness, numbness/tingling, or LOC due does report mild dizziness.    Cardiac arrest s/p [facility] ECMO 11/25  Hemodynamic instability  Hypovolemia  Post op respiratory insufficiency  Thrombocytopenia  Acute blood loss anemia  Leukocytosis   ======================================================  Plan:   ***Neuro***  CT head showed 4mm subdural hematoma 11/26: repeat CT head unchanged   [x] Sedated with [x] Precedex   Post operative neuro assessment     ***Cardiovascular***  Invasive hemodynamic monitoring, assess perfusion indices   SR / CVP 4/ MAP 73/ PAP 19/ CI 2/ Hct 26.0/ Lactate 0.8  [x] Cardene - 2mg/hr [x] Primacor- 0.25mcgs  [x] ECMO- 3600 rpms, 3.9 flow  Reassessment of hemodynamics post resuscitation   [x] AC Therapy with Heparin gtt, PTT 40 -50 goal, no higher d/t SDH  Went into Bradycardic and Hypoxic arrest 11/25  Serial EKG and cardiac enzymes     ***Pulmonary***  [x]  Nitric oxide- 20 ppm   Post op vent management   Titration of FiO2 and PEEP, follow SpO2, CXR, blood gasses   100% FiO2 on ECMO  Titrate sweep for pco2 and ph    Mode: AC/ CMV (Assist Control/ Continuous Mandatory Ventilation)  RR (machine): 14  FiO2: 40  PEEP: 10  ITime: 1  MAP: 14  PC: 10  PIP: 22              ***GI***  [x] Tolerating TF Vital AF, @ goal 10cc/hr.   [x] Protonix    Bowel regimen with Miralax and senna     ***Renal***  Continue to monitor I/Os, BUN/Creatinine.   Replete lytes PRN  Va present    ***ID***  Meropenem for empiric coverage  Follow up cultures    ***Endocrine***  [x]  DM2: HbA1c 7.3%                - [x] Insulin gtt [x] ISS             - Need tight glycemic control to prevent wound infection.        Patient requires continuous monitoring with:  bedside rhythm monitoring, arterial line, pulse oximetry, ventilator management and monitoring; intermittent blood gas analysis. Care plan discussed with ICU care team.  patient remain critical; required more than usual post op care; I have spent 35 minutes providing non routine post op care, revaluated multiple times during the day .     Care Plan discussed with the ICU care team. Patient remained critical, at risk for life threatening decompensation.     By signing my name below, I, Rosio Alonzo, attest that this documentation has been prepared under the direction and in the presence of Yusef Millan MD.  Electronically signed: Brian Burnett, 11-29-22 @ 09:02    IMonty, personally performed the services described in this documentation. all medical record entries made by the scribe were at my direction and in my presence. I have reviewed the chart and agree that the record reflects my personal performance and is accurate and complete  Electronically signed: Yusef Millan MD. Patient seen and examined at the bedside.    Remained critically ill on continuous ICU monitoring.    OBJECTIVE:  Vital Signs Last 24 Hrs  T(C): 37 (29 Nov 2022 08:00), Max: 37 (28 Nov 2022 12:00)  T(F): 98.6 (29 Nov 2022 08:00), Max: 98.6 (28 Nov 2022 12:00)  HR: 79 (29 Nov 2022 08:45) (68 - 79)  BP: --  BP(mean): --  RR: 21 (29 Nov 2022 08:45) (14 - 23)  SpO2: 99% (29 Nov 2022 08:45) (97% - 100%)    Parameters below as of 29 Nov 2022 08:30  O2 Concentration (%): 50      Physical Exam:   General: Intubated, multiple lines gtt  Neurology: sedated > on sedation vacation woke up and followed commands  Eyes: bilateral pupils equal and reactive   ENT/Neck: +ETT midline, Neck supple, trachea midline, No JVD   Respiratory: Clear bilaterally   CV: S1S2, no murmurs        [x] Sinus rhythm  Abdominal: Soft, NT, ND +BS   Extremities: 1-2+ pedal edema noted, + peripheral pulses   Skin: No Rashes, Hematoma, Ecchymosis           ============================I/O===========================   I&O's Detail    28 Nov 2022 07:01  -  29 Nov 2022 07:00  --------------------------------------------------------  IN:    Albumin 5% - 50 mL: 100 mL    Dexmedetomidine: 594.3 mL    Free Water: 800 mL    Heparin: 252 mL    Insulin: 78 mL    IV PiggyBack: 800 mL    Milrinone: 28 mL    PRBCs (Packed Red Blood Cells): 300 mL    sodium chloride 0.9%: 220 mL    Vital1.5: 210 mL  Total IN: 3382.3 mL    OUT:    Indwelling Catheter - Urethral (mL): 3340 mL    NiCARdipine: 0 mL    Vasopressin: 0 mL  Total OUT: 3340 mL    Total NET: 42.3 mL      29 Nov 2022 07:01  -  29 Nov 2022 09:02  --------------------------------------------------------  IN:    Dexmedetomidine: 55.8 mL    Heparin: 22 mL    Insulin: 6 mL    IV PiggyBack: 100 mL    IV PiggyBack: 50 mL    Milrinone: 9.9 mL    sodium chloride 0.9%: 10 mL    Vital1.5: 20 mL  Total IN: 273.7 mL    OUT:    Indwelling Catheter - Urethral (mL): 250 mL    NiCARdipine: 0 mL  Total OUT: 250 mL    Total NET: 23.7 mL  ============================ LABS =========================                        8.5    15.07 )-----------( 70       ( 29 Nov 2022 03:01 )             25.4     11-29    153<H>  |  119<H>  |  68<H>  ----------------------------<  130<H>  3.5   |  21<L>  |  3.45<H>    Ca    8.4      29 Nov 2022 00:26  Phos  3.2     11-29  Mg     2.5     11-29    TPro  6.1  /  Alb  3.2<L>  /  TBili  0.8  /  DBili  x   /  AST  228<H>  /  ALT  72<H>  /  AlkPhos  63  11-28    LIVER FUNCTIONS - ( 28 Nov 2022 00:58 )  Alb: 3.2 g/dL / Pro: 6.1 g/dL / ALK PHOS: 63 U/L / ALT: 72 U/L / AST: 228 U/L / GGT: x           PT/INR - ( 29 Nov 2022 00:24 )   PT: 11.6 sec;   INR: 1.01 ratio         PTT - ( 29 Nov 2022 00:24 )  PTT:41.0 sec  ABG - ( 29 Nov 2022 06:52 )  pH, Arterial: 7.38  pH, Blood: x     /  pCO2: 36    /  pO2: 71    / HCO3: 21    / Base Excess: -3.4  /  SaO2: 93.9      ======================Micro/Rad/Cardio=================  Culture: Reviewed   CXR: Reviewed  Echo: Reviewed  ======================================================  PAST MEDICAL & SURGICAL HISTORY:    ======================================================  Assessment:  61 y/o male with PMH of CABG, DM, HTN, HLD presenting as transfer from [place] for c/f STEMI. PTA arrival received 325 aspirin, 600 plavix, and no heparin drip started. Around 1:30 am patient had multiple episodes of non-bloody diarrhea and emesis with associated abdominal pain and chest pain, unable to localize, non-radiating, with associated SOB and no associated palpitations or diaphoresis. No recent fevers/chills, cough, rashes, or changes in urination. Does report mild diffuse back pain; started 5 days ago in setting on injury while walking and lifting objects. Denies focal weakness, numbness/tingling, or LOC due does report mild dizziness.    Cardiac arrest s/p [facility] ECMO 11/25  Hemodynamic instability  Hypovolemia  Post op respiratory insufficiency  Thrombocytopenia  Acute blood loss anemia  Leukocytosis   ======================================================  Plan:   ***Neuro***  CT head showed 4mm subdural hematoma 11/26: repeat CT head unchanged   [x] Sedated with [x] Precedex   Post operative neuro assessment     ***Cardiovascular***  Invasive hemodynamic monitoring, assess perfusion indices   SR / CVP 4/ MAP 73/ PAP 19/ CI 2/ Hct 26.0/ Lactate 0.8  [x] Cardene - 2mg/hr [x] Primacor- 0.25mcgs  [x] ECMO- 3600 rpms, 3.9 flow  Reassessment of hemodynamics post resuscitation   [x] AC Therapy with Heparin gtt, PTT 40 -50 goal, no higher d/t SDH  Went into Bradycardic and Hypoxic arrest 11/25  Serial EKG and cardiac enzymes     ***Pulmonary***  [x]  Nitric oxide- 20 ppm   Post op vent management   Titration of FiO2 and PEEP, follow SpO2, CXR, blood gasses   100% FiO2 on ECMO  Titrate sweep for pco2 and ph    Mode: AC/ CMV (Assist Control/ Continuous Mandatory Ventilation)  RR (machine): 14  FiO2: 40  PEEP: 10  ITime: 1  MAP: 14  PC: 10  PIP: 22              ***GI***  [x] Tolerating TF Vital AF, @ goal 10cc/hr.   [x] Protonix    Bowel regimen with Miralax and senna     ***Renal***  Continue to monitor I/Os, BUN/Creatinine.   Replete lytes PRN  Va present    ***ID***  Meropenem for empiric coverage  Follow up cultures    ***Endocrine***  [x]  DM2: HbA1c 7.3%                - [x] Insulin gtt [x] ISS             - Need tight glycemic control to prevent wound infection.        Patient requires continuous monitoring with:  bedside rhythm monitoring, arterial line, pulse oximetry, ventilator management and monitoring; intermittent blood gas analysis. Care plan discussed with ICU care team.  patient remain critical; required more than usual post op care; I have spent 35 minutes providing non routine post op care, revaluated multiple times during the day .     Care Plan discussed with the ICU care team. Patient remained critical, at risk for life threatening decompensation.     By signing my name below, I, Rosio Alonzo, attest that this documentation has been prepared under the direction and in the presence of Yusef Millan MD.  Electronically signed: Brian Burnett, 11-29-22 @ 09:02    IMonty, personally performed the services described in this documentation. all medical record entries made by the scribe were at my direction and in my presence. I have reviewed the chart and agree that the record reflects my personal performance and is accurate and complete  Electronically signed: Yusef Millan MD. Patient seen and examined at the bedside.    Remained critically ill on continuous ICU monitoring.    OBJECTIVE:  Vital Signs Last 24 Hrs  T(C): 37 (29 Nov 2022 08:00), Max: 37 (28 Nov 2022 12:00)  T(F): 98.6 (29 Nov 2022 08:00), Max: 98.6 (28 Nov 2022 12:00)  HR: 79 (29 Nov 2022 08:45) (68 - 79)  BP: --  BP(mean): --  RR: 21 (29 Nov 2022 08:45) (14 - 23)  SpO2: 99% (29 Nov 2022 08:45) (97% - 100%)    Parameters below as of 29 Nov 2022 08:30  O2 Concentration (%): 50      Physical Exam:   General: Intubated, multiple lines gtt  Neurology: sedated > on sedation vacation woke up and followed commands  Eyes: bilateral pupils equal and reactive   ENT/Neck: +ETT midline, Neck supple, trachea midline, No JVD   Respiratory: Clear bilaterally   CV: S1S2, no murmurs        [x] Sinus rhythm  Abdominal: Soft, NT, ND +BS   Extremities: 1-2+ pedal edema noted, + peripheral pulses   Skin: No Rashes, Hematoma, Ecchymosis           ============================I/O===========================   I&O's Detail    28 Nov 2022 07:01  -  29 Nov 2022 07:00  --------------------------------------------------------  IN:    Albumin 5% - 50 mL: 100 mL    Dexmedetomidine: 594.3 mL    Free Water: 800 mL    Heparin: 252 mL    Insulin: 78 mL    IV PiggyBack: 800 mL    Milrinone: 28 mL    PRBCs (Packed Red Blood Cells): 300 mL    sodium chloride 0.9%: 220 mL    Vital1.5: 210 mL  Total IN: 3382.3 mL    OUT:    Indwelling Catheter - Urethral (mL): 3340 mL    NiCARdipine: 0 mL    Vasopressin: 0 mL  Total OUT: 3340 mL    Total NET: 42.3 mL      29 Nov 2022 07:01  -  29 Nov 2022 09:02  --------------------------------------------------------  IN:    Dexmedetomidine: 55.8 mL    Heparin: 22 mL    Insulin: 6 mL    IV PiggyBack: 100 mL    IV PiggyBack: 50 mL    Milrinone: 9.9 mL    sodium chloride 0.9%: 10 mL    Vital1.5: 20 mL  Total IN: 273.7 mL    OUT:    Indwelling Catheter - Urethral (mL): 250 mL    NiCARdipine: 0 mL  Total OUT: 250 mL    Total NET: 23.7 mL  ============================ LABS =========================                        8.5    15.07 )-----------( 70       ( 29 Nov 2022 03:01 )             25.4     11-29    153<H>  |  119<H>  |  68<H>  ----------------------------<  130<H>  3.5   |  21<L>  |  3.45<H>    Ca    8.4      29 Nov 2022 00:26  Phos  3.2     11-29  Mg     2.5     11-29    TPro  6.1  /  Alb  3.2<L>  /  TBili  0.8  /  DBili  x   /  AST  228<H>  /  ALT  72<H>  /  AlkPhos  63  11-28    LIVER FUNCTIONS - ( 28 Nov 2022 00:58 )  Alb: 3.2 g/dL / Pro: 6.1 g/dL / ALK PHOS: 63 U/L / ALT: 72 U/L / AST: 228 U/L / GGT: x           PT/INR - ( 29 Nov 2022 00:24 )   PT: 11.6 sec;   INR: 1.01 ratio         PTT - ( 29 Nov 2022 00:24 )  PTT:41.0 sec  ABG - ( 29 Nov 2022 06:52 )  pH, Arterial: 7.38  pH, Blood: x     /  pCO2: 36    /  pO2: 71    / HCO3: 21    / Base Excess: -3.4  /  SaO2: 93.9      ======================Micro/Rad/Cardio=================  Culture: Reviewed   CXR: Reviewed  Echo: Reviewed  ======================================================  PAST MEDICAL & SURGICAL HISTORY:    ======================================================  Assessment:  61 y/o male with PMH of CABG, DM, HTN, HLD presenting as transfer from [place] for c/f STEMI. PTA arrival received 325 aspirin, 600 plavix, and no heparin drip started. Around 1:30 am patient had multiple episodes of non-bloody diarrhea and emesis with associated abdominal pain and chest pain, unable to localize, non-radiating, with associated SOB and no associated palpitations or diaphoresis. No recent fevers/chills, cough, rashes, or changes in urination. Does report mild diffuse back pain; started 5 days ago in setting on injury while walking and lifting objects. Denies focal weakness, numbness/tingling, or LOC due does report mild dizziness.    Cardiac arrest s/p [facility] ECMO 11/25  cardiogenic shock  Hemodynamic instability  Hypovolemia  acute  respiratory insufficiency/failure  Thrombocytopenia  Acute blood loss anemia  Leukocytosis   ======================================================  Plan:   ***Neuro***  CT head showed 4mm subdural hematoma 11/26: repeat CT head unchanged   [x] Sedated with [x] Precedex   Post operative neuro assessment     ***Cardiovascular***  Invasive hemodynamic monitoring, assess perfusion indices   SR / CVP 4/ MAP 73/ PAP 19/ CI 2/ Hct 26.0/ Lactate 0.8  [x] Cardene - 2mg/hr [x] Primacor- 0.25mcgs  [x] ECMO- 3600 rpms, 3.9 flow  Reassessment of hemodynamics post resuscitation   [x] AC Therapy with Heparin gtt, PTT 40 -50 goal, no higher d/t SDH  Went into Bradycardic and Hypoxic arrest 11/25  Serial EKG and cardiac enzymes     ***Pulmonary***  [x]  Nitric oxide- 20 ppm   Post op vent management   Titration of FiO2 and PEEP, follow SpO2, CXR, blood gasses   100% FiO2 on ECMO  Titrate sweep for pco2 and ph    Mode: AC/ CMV (Assist Control/ Continuous Mandatory Ventilation)  RR (machine): 14  FiO2: 40  PEEP: 10  ITime: 1  MAP: 14  PC: 10  PIP: 22              ***GI***  [x] Tolerating TF Vital AF, @ goal 10cc/hr.   [x] Protonix    Bowel regimen with Miralax and senna     ***Renal***  Continue to monitor I/Os, BUN/Creatinine.   Replete lytes PRN  De Dios present    ***ID***  Meropenem for empiric coverage  Follow up cultures    ***Endocrine***  [x]  DM2: HbA1c 7.3%                - [x] Insulin gtt [x] ISS             - Need tight glycemic control to prevent wound infection.        Patient requires continuous monitoring with:  bedside rhythm monitoring, arterial line, pulse oximetry, ventilator management and monitoring; intermittent blood gas analysis. Care plan discussed with ICU care team.  patient remain critical; required more than usual post op care; I have spent 35 minutes providing non routine post op care, revaluated multiple times during the day .     Care Plan discussed with the ICU care team. Patient remained critical, at risk for life threatening decompensation.     By signing my name below, I, Rosio Alonzo, attest that this documentation has been prepared under the direction and in the presence of Yusef Millan MD.  Electronically signed: Brian Burnett, 11-29-22 @ 09:02    I, Monty Holbrook, personally performed the services described in this documentation. all medical record entries made by the scribe were at my direction and in my presence. I have reviewed the chart and agree that the record reflects my personal performance and is accurate and complete  Electronically signed: Yusef Millan MD. Patient seen and examined at the bedside.    Remained critically ill on continuous ICU monitoring.    OBJECTIVE:  Vital Signs Last 24 Hrs  T(C): 37 (29 Nov 2022 08:00), Max: 37 (28 Nov 2022 12:00)  T(F): 98.6 (29 Nov 2022 08:00), Max: 98.6 (28 Nov 2022 12:00)  HR: 79 (29 Nov 2022 08:45) (68 - 79)  BP: --  BP(mean): --  RR: 21 (29 Nov 2022 08:45) (14 - 23)  SpO2: 99% (29 Nov 2022 08:45) (97% - 100%)    Parameters below as of 29 Nov 2022 08:30  O2 Concentration (%): 50      Physical Exam:   General: Intubated, multiple lines gtt  Neurology: sedated > on sedation vacation woke up and followed commands  Eyes: bilateral pupils equal and reactive   ENT/Neck: +ETT midline, Neck supple, trachea midline, No JVD   Respiratory: Clear bilaterally   CV: S1S2, no murmurs        [x] Sinus rhythm  Abdominal: Soft, NT, ND +BS   Extremities: 1-2+ pedal edema noted, + peripheral pulses   Skin: No Rashes, Hematoma, Ecchymosis           ============================I/O===========================   I&O's Detail    28 Nov 2022 07:01  -  29 Nov 2022 07:00  --------------------------------------------------------  IN:    Albumin 5% - 50 mL: 100 mL    Dexmedetomidine: 594.3 mL    Free Water: 800 mL    Heparin: 252 mL    Insulin: 78 mL    IV PiggyBack: 800 mL    Milrinone: 28 mL    PRBCs (Packed Red Blood Cells): 300 mL    sodium chloride 0.9%: 220 mL    Vital1.5: 210 mL  Total IN: 3382.3 mL    OUT:    Indwelling Catheter - Urethral (mL): 3340 mL    NiCARdipine: 0 mL    Vasopressin: 0 mL  Total OUT: 3340 mL    Total NET: 42.3 mL      29 Nov 2022 07:01  -  29 Nov 2022 09:02  --------------------------------------------------------  IN:    Dexmedetomidine: 55.8 mL    Heparin: 22 mL    Insulin: 6 mL    IV PiggyBack: 100 mL    IV PiggyBack: 50 mL    Milrinone: 9.9 mL    sodium chloride 0.9%: 10 mL    Vital1.5: 20 mL  Total IN: 273.7 mL    OUT:    Indwelling Catheter - Urethral (mL): 250 mL    NiCARdipine: 0 mL  Total OUT: 250 mL    Total NET: 23.7 mL  ============================ LABS =========================                        8.5    15.07 )-----------( 70       ( 29 Nov 2022 03:01 )             25.4     11-29    153<H>  |  119<H>  |  68<H>  ----------------------------<  130<H>  3.5   |  21<L>  |  3.45<H>    Ca    8.4      29 Nov 2022 00:26  Phos  3.2     11-29  Mg     2.5     11-29    TPro  6.1  /  Alb  3.2<L>  /  TBili  0.8  /  DBili  x   /  AST  228<H>  /  ALT  72<H>  /  AlkPhos  63  11-28    LIVER FUNCTIONS - ( 28 Nov 2022 00:58 )  Alb: 3.2 g/dL / Pro: 6.1 g/dL / ALK PHOS: 63 U/L / ALT: 72 U/L / AST: 228 U/L / GGT: x           PT/INR - ( 29 Nov 2022 00:24 )   PT: 11.6 sec;   INR: 1.01 ratio         PTT - ( 29 Nov 2022 00:24 )  PTT:41.0 sec  ABG - ( 29 Nov 2022 06:52 )  pH, Arterial: 7.38  pH, Blood: x     /  pCO2: 36    /  pO2: 71    / HCO3: 21    / Base Excess: -3.4  /  SaO2: 93.9      ======================Micro/Rad/Cardio=================  Culture: Reviewed   CXR: Reviewed  Echo: Reviewed  ======================================================  PAST MEDICAL & SURGICAL HISTORY:    ======================================================  Assessment:  61 y/o male with PMH of CABG, DM, HTN, HLD presenting as transfer from [place] for c/f STEMI. PTA arrival received 325 aspirin, 600 plavix, and no heparin drip started. Around 1:30 am patient had multiple episodes of non-bloody diarrhea and emesis with associated abdominal pain and chest pain, unable to localize, non-radiating, with associated SOB and no associated palpitations or diaphoresis. No recent fevers/chills, cough, rashes, or changes in urination. Does report mild diffuse back pain; started 5 days ago in setting on injury while walking and lifting objects. Denies focal weakness, numbness/tingling, or LOC due does report mild dizziness.    Cardiac arrest s/p VA ECMO 11/25  cardiogenic shock  Hemodynamic instability  Hypovolemia  acute  respiratory insufficiency/failure  Thrombocytopenia  Acute blood loss anemia  Leukocytosis   ======================================================  Plan:   ***Neuro***  CT head showed 4mm subdural hematoma 11/26: repeat CT head unchanged   [x] Sedated with [x] Precedex   Post operative neuro assessment     ***Cardiovascular***  Invasive hemodynamic monitoring, assess perfusion indices   SR / CVP 4/ MAP 73/ PAP 19/ CI 2/ Hct 26.0/ Lactate 0.8  [x] Cardene - 2mg/hr [x] Primacor- 0.25mcgs  [x] ECMO- 3600 rpms, 3.9 flow  Reassessment of hemodynamics post resuscitation   [x] AC Therapy with Heparin gtt, PTT 40 -50 goal, no higher d/t SDH  Went into Bradycardic and Hypoxic arrest 11/25  Serial EKG and cardiac enzymes     ***Pulmonary***  [x]  Nitric oxide- 20 ppm   Post op vent management   Titration of FiO2 and PEEP, follow SpO2, CXR, blood gasses   100% FiO2 on ECMO  Titrate sweep for pco2 and ph    Mode: AC/ CMV (Assist Control/ Continuous Mandatory Ventilation)  RR (machine): 14  FiO2: 40  PEEP: 10  ITime: 1  MAP: 14  PC: 10  PIP: 22              ***GI***  [x] Tolerating TF Vital AF, @ goal 10cc/hr.   [x] Protonix    Bowel regimen with Miralax and senna     ***Renal***  Continue to monitor I/Os, BUN/Creatinine.   Replete lytes PRN  De Dios present    ***ID***  Meropenem for empiric coverage  Follow up cultures    ***Endocrine***  [x]  DM2: HbA1c 7.3%                - [x] Insulin gtt [x] ISS             - Need tight glycemic control to prevent wound infection.        Patient requires continuous monitoring with:  bedside rhythm monitoring, arterial line, pulse oximetry, ventilator management and monitoring; intermittent blood gas analysis. Care plan discussed with ICU care team.  patient remain critical; required more than usual post op care; I have spent 35 minutes providing non routine post op care, revaluated multiple times during the day .     Care Plan discussed with the ICU care team. Patient remained critical, at risk for life threatening decompensation.     By signing my name below, I, Rosio Alonzo, attest that this documentation has been prepared under the direction and in the presence of Yusef Millan MD.  Electronically signed: Brian Burnett, 11-29-22 @ 09:02    I, Monty Holbrook, personally performed the services described in this documentation. all medical record entries made by the scribe were at my direction and in my presence. I have reviewed the chart and agree that the record reflects my personal performance and is accurate and complete  Electronically signed: Yusef Millan MD. Patient seen and examined at the bedside.    Remained critically ill on continuous ICU monitoring.    OBJECTIVE:  Vital Signs Last 24 Hrs  T(C): 37 (29 Nov 2022 08:00), Max: 37 (28 Nov 2022 12:00)  T(F): 98.6 (29 Nov 2022 08:00), Max: 98.6 (28 Nov 2022 12:00)  HR: 79 (29 Nov 2022 08:45) (68 - 79)  BP: --  BP(mean): --  RR: 21 (29 Nov 2022 08:45) (14 - 23)  SpO2: 99% (29 Nov 2022 08:45) (97% - 100%)    Parameters below as of 29 Nov 2022 08:30  O2 Concentration (%): 50      Physical Exam:   General: Intubated, multiple lines gtt  Neurology: sedated > on sedation vacation woke up and followed commands  Eyes: bilateral pupils equal and reactive   ENT/Neck: +ETT midline, Neck supple, trachea midline, No JVD   Respiratory: Clear bilaterally   CV: S1S2, no murmurs        [x] Sinus rhythm  Abdominal: Soft, NT, ND +BS   Extremities: 1-2+ pedal edema noted, + peripheral pulses   Skin: No Rashes, Hematoma, Ecchymosis           ============================I/O===========================   I&O's Detail    28 Nov 2022 07:01  -  29 Nov 2022 07:00  --------------------------------------------------------  IN:    Albumin 5% - 50 mL: 100 mL    Dexmedetomidine: 594.3 mL    Free Water: 800 mL    Heparin: 252 mL    Insulin: 78 mL    IV PiggyBack: 800 mL    Milrinone: 28 mL    PRBCs (Packed Red Blood Cells): 300 mL    sodium chloride 0.9%: 220 mL    Vital1.5: 210 mL  Total IN: 3382.3 mL    OUT:    Indwelling Catheter - Urethral (mL): 3340 mL    NiCARdipine: 0 mL    Vasopressin: 0 mL  Total OUT: 3340 mL    Total NET: 42.3 mL      29 Nov 2022 07:01  -  29 Nov 2022 09:02  --------------------------------------------------------  IN:    Dexmedetomidine: 55.8 mL    Heparin: 22 mL    Insulin: 6 mL    IV PiggyBack: 100 mL    IV PiggyBack: 50 mL    Milrinone: 9.9 mL    sodium chloride 0.9%: 10 mL    Vital1.5: 20 mL  Total IN: 273.7 mL    OUT:    Indwelling Catheter - Urethral (mL): 250 mL    NiCARdipine: 0 mL  Total OUT: 250 mL    Total NET: 23.7 mL  ============================ LABS =========================                        8.5    15.07 )-----------( 70       ( 29 Nov 2022 03:01 )             25.4     11-29    153<H>  |  119<H>  |  68<H>  ----------------------------<  130<H>  3.5   |  21<L>  |  3.45<H>    Ca    8.4      29 Nov 2022 00:26  Phos  3.2     11-29  Mg     2.5     11-29    TPro  6.1  /  Alb  3.2<L>  /  TBili  0.8  /  DBili  x   /  AST  228<H>  /  ALT  72<H>  /  AlkPhos  63  11-28    LIVER FUNCTIONS - ( 28 Nov 2022 00:58 )  Alb: 3.2 g/dL / Pro: 6.1 g/dL / ALK PHOS: 63 U/L / ALT: 72 U/L / AST: 228 U/L / GGT: x           PT/INR - ( 29 Nov 2022 00:24 )   PT: 11.6 sec;   INR: 1.01 ratio         PTT - ( 29 Nov 2022 00:24 )  PTT:41.0 sec  ABG - ( 29 Nov 2022 06:52 )  pH, Arterial: 7.38  pH, Blood: x     /  pCO2: 36    /  pO2: 71    / HCO3: 21    / Base Excess: -3.4  /  SaO2: 93.9      ======================Micro/Rad/Cardio=================  Culture: Reviewed   CXR: Reviewed  Echo: Reviewed  ======================================================  PAST MEDICAL & SURGICAL HISTORY:    ======================================================  Assessment:  61 y/o male with PMH of CABG, DM, HTN, HLD presenting as transfer from [place] for c/f STEMI. PTA arrival received 325 aspirin, 600 plavix, and no heparin drip started. Around 1:30 am patient had multiple episodes of non-bloody diarrhea and emesis with associated abdominal pain and chest pain, unable to localize, non-radiating, with associated SOB and no associated palpitations or diaphoresis. No recent fevers/chills, cough, rashes, or changes in urination. Does report mild diffuse back pain; started 5 days ago in setting on injury while walking and lifting objects. Denies focal weakness, numbness/tingling, or LOC due does report mild dizziness.    Cardiac arrest s/p VA ECMO 11/25  cardiogenic shock  Hemodynamic instability  Hypovolemia  acute  respiratory insufficiency/failure  Thrombocytopenia  Acute blood loss anemia  Leukocytosis   ======================================================  Plan:   ***Neuro***  CT head showed 4mm subdural hematoma 11/26: repeat CT head unchanged   [x] Sedated with [x] Precedex   Post operative neuro assessment     ***Cardiovascular***  Invasive hemodynamic monitoring, assess perfusion indices   SR / CVP 4/ MAP 73/ PAP 19/ CI 2/ Hct 26.0/ Lactate 0.8  [x] Cardene - 2mg/hr [x] Primacor- 0.25mcgs  [x] ECMO- 3600 rpms, 3.9 flow  Reassessment of hemodynamics post resuscitation   [x] AC Therapy with Heparin gtt, PTT 40 -50 goal, no higher d/t SDH  Went into Bradycardic and Hypoxic arrest 11/25  Serial EKG and cardiac enzymes     ***Pulmonary***  [x]  Nitric oxide- 20 ppm   Post op vent management   Titration of FiO2 and PEEP, follow SpO2, CXR, blood gasses   100% FiO2 on ECMO  Titrate sweep for pco2 and ph    Mode: AC/ CMV (Assist Control/ Continuous Mandatory Ventilation)  RR (machine): 14  FiO2: 40  PEEP: 10  ITime: 1  MAP: 14  PC: 10  PIP: 22              ***GI***  [x] Tolerating TF Vital AF, @ goal 10cc/hr.   [x] Protonix    Bowel regimen with Miralax and senna     ***Renal***  Continue to monitor I/Os, BUN/Creatinine.   Replete lytes PRN  De Dios present    ***ID***  Meropenem for empiric coverage  Follow up cultures    ***Endocrine***  [x]  DM2: HbA1c 7.3%                - [x] Insulin gtt [x] ISS             - Need tight glycemic control to prevent wound infection.        Patient requires continuous monitoring with:  bedside rhythm monitoring, arterial line, pulse oximetry, ventilator management and monitoring; intermittent blood gas analysis. Care plan discussed with ICU care team.  patient remain critical; required more than usual post op care; I have spent 35 minutes providing non routine post op care, revaluated multiple times during the day .     Care Plan discussed with the ICU care team. Patient remained critical, at risk for life threatening decompensation.     By signing my name below, I, Rosio Alonzo, attest that this documentation has been prepared under the direction and in the presence of Yusef Millan MD.  Electronically signed: Brian Burnett, 11-29-22 @ 09:02    I, Monty Holbrook, personally performed the services described in this documentation. all medical record entries made by the scribe were at my direction and in my presence. I have reviewed the chart and agree that the record reflects my personal performance and is accurate and complete  Electronically signed: Yusef Millan MD.

## 2022-11-29 NOTE — CONSULT NOTE ADULT - PROBLEM SELECTOR RECOMMENDATION 9
See the [facility] note enter by Jas Baeza. However, in summary, the patient's son and daughter (who is a Dr.) had a good understanding about the patient's illness (cardiogenic shock c/b respiratory and renal failure [ likely 2/2 to ARDS and HF]) and current Rxs (ECMO and mechanical ventilation). However, his wife had a limited understanding about his disease. Hence, using a English , the CTU team and I provided the patient's wife with simple but detailed info about the patient's illnesses and current Rxs. We d/w his family about the temporary nature of ECMO (Only works as a bridge to Cardia recovery or advanced therapies). His family is hopeful that as his pancreatitis get better, he will be able to get off the vent, recover his kidney function and move on with his life. However, they realized the patient is still critically ill and that his condition can change suddenly. See the [facility] note enter by Jas Baeza. However, in summary, the patient's son and daughter (who is a Dr.) had a good understanding about the patient's illness (cardiogenic shock c/b respiratory and renal failure [ likely 2/2 to ARDS and HF]) and current Rxs (ECMO and mechanical ventilation). However, his wife had a limited understanding about his disease. Hence, using a Japanese , the CTU team and I provided the patient's wife with simple but detailed info about the patient's illnesses and current Rxs. We d/w his family about the temporary nature of ECMO (Only works as a bridge to Cardia recovery or advanced therapies). His family is hopeful that as his pancreatitis get better, he will be able to get off the vent, recover his kidney function and move on with his life. However, they realized the patient is still critically ill and that his condition can change suddenly. See the [facility] note enter by Jas Baeza. However, in summary, the patient's son and daughter (who is a Dr.) had a good understanding about the patient's illness (cardiogenic shock c/b respiratory and renal failure [ likely 2/2 to ARDS and HF]) and current Rxs (ECMO and mechanical ventilation). However, his wife had a limited understanding about his disease. Hence, using a Upper sorbian , the CTU team and I provided the patient's wife with simple but detailed info about the patient's illnesses and current Rxs. We d/w his family about the temporary nature of ECMO (Only works as a bridge to Cardia recovery or advanced therapies). His family is hopeful that as his pancreatitis get better, he will be able to get off the vent, recover his kidney function and move on with his life. However, they realized the patient is still critically ill and that his condition can change suddenly.

## 2022-11-29 NOTE — CONSULT NOTE ADULT - ASSESSMENT
full note to f/u.   Please see the [facility] note entered by the palliative care SW, Jas Uriostegui.         Mynor Mitchell MD  Associate Chief Geriatrics and Palliative Care (GaP) [facility]   [place] [facility]   , [employer] at [facility]      Please contact me via Teams from Monday through Friday between 9am-5pm. If not answering, please call the palliative care pager [phone]    After 5pm and on weekends, please see the contact information below:    In the event of newly developing, evolving, or worsening symptoms, please contact the Palliative Medicine team via pager (if the patient is at [facility] #[phone] or if the patient is at [facility] #[phone]) The Geriatric and Palliative Medicine service has coverage 24 hours a day/ 7 days a week to provide medical recommendations regarding symptom management needs via telephone full note to f/u.   Please see the [facility] note entered by the palliative care SW, Jas Uriostegui.         Mynor Mitchell MD  Associate Chief Geriatrics and Palliative Care (GaP) [facility]   [place] [facility]   , [employer] at [facility]      Please contact me via Teams from Monday through Friday between 9am-5pm. If not answering, please call the palliative care pager [phone]    After 5pm and on weekends, please see the contact information below:    In the event of newly developing, evolving, or worsening symptoms, please contact the Palliative Medicine team via pager (if the patient is at [facility] #[phone] or if the patient is at [facility] #[phone]) The Geriatric and Palliative Medicine service has coverage 24 hours a day/ 7 days a week to provide medical recommendations regarding symptom management needs via telephone full note to f/u.   Please see the [facility] note entered by the palliative care SW, Jas Uriostegui.         Mynor Mitchell MD  Associate Chief Geriatrics and Palliative Care (GaP) [facility]   [place] [facility]   , [employer] at [facility]      Please contact me via Teams from Monday through Friday between 9am-5pm. If not answering, please call the palliative care pager [phone]    After 5pm and on weekends, please see the contact information below:    In the event of newly developing, evolving, or worsening symptoms, please contact the Palliative Medicine team via pager (if the patient is at [facility] #[phone] or if the patient is at [facility] #[phone]) The Geriatric and Palliative Medicine service has coverage 24 hours a day/ 7 days a week to provide medical recommendations regarding symptom management needs via telephone 61 YO M with a history of CAD s/p 4v CABG ~2019 in [facility], DM2 (A1c 7.3%), and HTN who initially presented to OSH with abdominal pain and n/v and found to have pancreatitis and elevated troponins. CT abdomen revealed acute pancreatitis and his initial LVEF was 40-45%. He developed MSOF with hypoxic respiratory failure requiring intubation and ERIC and went into hypoxic PEA arrest requiring ACLS and due to persistent hypoxia and hypotension on pressors after ROSC was cannulated to VA ECMO on 11/25. Notably CTH that day revealed small subdural hemorrhage.     His post arrest TTE on 11/26 showed a signficantly worse LVEF of 5-10% with an AV that was only minimally opening. Geriatrics and Palliative Medicine (GaP) Team was called for GOC and support through VA ECMO  63 YO M with a history of CAD s/p 4v CABG ~2019 in [facility], DM2 (A1c 7.3%), and HTN who initially presented to OSH with abdominal pain and n/v and found to have pancreatitis and elevated troponins. CT abdomen revealed acute pancreatitis and his initial LVEF was 40-45%. He developed MSOF with hypoxic respiratory failure requiring intubation and ERIC and went into hypoxic PEA arrest requiring ACLS and due to persistent hypoxia and hypotension on pressors after ROSC was cannulated to VA ECMO on 11/25. Notably CTH that day revealed small subdural hemorrhage.     His post arrest TTE on 11/26 showed a signficantly worse LVEF of 5-10% with an AV that was only minimally opening. Geriatrics and Palliative Medicine (GaP) Team was called for GOC and support through VA ECMO  63 YO M with a history of CAD s/p 4v CABG ~2019 in [facility], DM2 (A1c 7.3%), and HTN who initially presented to OSH with abdominal pain and n/v and found to have pancreatitis and elevated troponins. CT abdomen revealed acute pancreatitis and his initial LVEF was 40-45%. He developed MSOF with hypoxic respiratory failure requiring intubation and ERIC and went into hypoxic PEA arrest requiring ACLS and due to persistent hypoxia and hypotension on pressors after ROSC was cannulated to VA ECMO on 11/25. Notably CTH that day revealed small subdural hemorrhage.     His post arrest TTE on 11/26 showed a signficantly worse LVEF of 5-10% with an AV that was only minimally opening. Geriatrics and Palliative Medicine (GaP) Team was called for GOC and support through VA ECMO

## 2022-11-29 NOTE — PROGRESS NOTE ADULT - PROBLEM SELECTOR PLAN 2
- troponin peaked at > 46329 and coming down   - plan for LHC once recovers form ERIC  - ASA/statin when able - troponin peaked at > 90577 and coming down   - plan for LHC once recovers form ERIC  - ASA/statin when able - troponin peaked at > 13857 and coming down   - plan for LHC once recovers form ERIC  - ASA/statin when able - troponin peaked at > 88170 and coming down   - plan for LHC once recovers from ERIC  - restart statin  - ASA when able - troponin peaked at > 01111 and coming down   - plan for LHC once recovers from ERIC  - restart statin  - ASA when able - troponin peaked at > 38421 and coming down   - plan for LHC once recovers from ERIC  - restart statin  - ASA when able

## 2022-11-29 NOTE — PROGRESS NOTE ADULT - SUBJECTIVE AND OBJECTIVE BOX
[facility] Division of Kidney Diseases & Hypertension  FOLLOW UP NOTE  [phone]--------------------------------------------------------------------------------  Chief Complaint:Acute pancreatitis without infection or necrosis      HPI:  61 y/o male with PMH of CABG, DM, HTN, HLD presenting as transfer from [place] for c/f STEMI. Course c/b gallstone pancreatitis leading to ARDS and shock & cardiac arrest now on [facility] ECMO. Had significant troponin elevation and his LVEF down to 8%. Pt was deemed to be too unstable to have an angiogram and potential PCI due to his co-morbidities & a new subdural bleed. Nephrology called for ERIC. SCr on arrival was 2.15; trended down to hector 1.6 on 11/25; slowly trended up & peaked to 3.95 11/28; now trending down to 3.45 today. Pt is non oliguric. Last lasix dose was on 11/26        Patient seen & examined. Currently on [facility] ECMO. CVP 4. Off nicardipine. Milrinone added.  UOP 3.3L in 24hrs with net even fluid balance         PAST HISTORY  --------------------------------------------------------------------------------  No significant changes to PMH, PSH, FHx, SHx, unless otherwise noted    ALLERGIES & MEDICATIONS  --------------------------------------------------------------------------------  Allergies    No Known Allergies    Intolerances      Standing Inpatient Medications  argatroban Infusion 0.2 MICROgram(s)/kG/Min IV Continuous <Continuous>  artificial  tears Solution 1 Drop(s) Both EYES four times a day  atorvastatin 40 milliGRAM(s) Oral at bedtime  chlorhexidine 0.12% Liquid 15 milliLiter(s) Oral Mucosa every 12 hours  chlorhexidine 2% Cloths 1 Application(s) Topical <User Schedule>  chlorhexidine 4% Liquid 1 Application(s) Topical <User Schedule>  dexMEDEtomidine Infusion 1.5 MICROgram(s)/kG/Hr IV Continuous <Continuous>  dextrose 50% Injectable 50 milliLiter(s) IV Push every 15 minutes  furosemide   Injectable 20 milliGRAM(s) IV Push once  hydrocortisone sodium succinate Injectable 25 milliGRAM(s) IV Push every 8 hours  insulin lispro (ADMELOG) corrective regimen sliding scale   SubCutaneous every 6 hours  insulin regular Infusion 6 Unit(s)/Hr IV Continuous <Continuous>  meropenem  IVPB 500 milliGRAM(s) IV Intermittent every 12 hours  milrinone Infusion 0.25 MICROgram(s)/kG/Min IV Continuous <Continuous>  niCARdipine Infusion 2 mG/Hr IV Continuous <Continuous>  pantoprazole  Injectable 40 milliGRAM(s) IV Push two times a day  polyethylene glycol 3350 17 Gram(s) Oral daily  potassium chloride  10 mEq/50 mL IVPB 10 milliEquivalent(s) IV Intermittent every 1 hour  potassium chloride  10 mEq/50 mL IVPB 10 milliEquivalent(s) IV Intermittent every 1 hour  senna 2 Tablet(s) Oral at bedtime  sodium chloride 0.9%. 1000 milliLiter(s) IV Continuous <Continuous>    PRN Inpatient Medications  sodium chloride 0.9% lock flush 10 milliLiter(s) IV Push every 1 hour PRN      REVIEW OF SYSTEMS  --------------------------------------------------------------------------------  unable to obtain    VITALS/PHYSICAL EXAM  --------------------------------------------------------------------------------  T(C): 37 (11-29-22 @ 08:00), Max: 37 (11-28-22 @ 12:00)  HR: 73 (11-29-22 @ 10:00) (68 - 79)  BP: --  RR: 20 (11-29-22 @ 10:00) (14 - 23)  SpO2: 100% (11-29-22 @ 10:00) (97% - 100%)  Wt(kg): --        11-28-22 @ 07:01  -  11-29-22 @ 07:00  --------------------------------------------------------  IN: 3382.3 mL / OUT: 3340 mL / NET: 42.3 mL    11-29-22 @ 07:01  -  11-29-22 @ 10:04  --------------------------------------------------------  IN: 454.6 mL / OUT: 350 mL / NET: 104.6 mL      Physical Exam:  	Gen: intubated  	HEENT: +ET  	Pulm: CTA B/L  	CV: S1S2, no rub  	Abd: Soft, +BS          + presacral edema  	Ext: + LE edema B/L  	Neuro: sedated  	Skin: Warm and dry  	Vascular access: ECMO              +Va with red urine (Cyanokit)      LABS/STUDIES  --------------------------------------------------------------------------------              8.5    15.07 >-----------<  70       [11-29-22 @ 03:01]              25.4     153  |  119  |  68  ----------------------------<  130      [11-29-22 @ 00:26]  3.5   |  21  |  3.45        Ca     8.4     [11-29-22 @ 00:26]      Mg     2.5     [11-29-22 @ 00:26]      Phos  3.2     [11-29-22 @ 00:26]    TPro  6.0  /  Alb  3.3  /  TBili  0.8  /  DBili  0.3  /  AST  86  /  ALT  21  /  AlkPhos  68  [11-29-22 @ 00:26]    PT/INR: PT 11.6 , INR 1.01       [11-29-22 @ 00:24]  PTT: 41.0       [11-29-22 @ 00:24]          [11-29-22 @ 00:26]        [11-29-22 @ 00:26]    Creatinine Trend:  SCr 3.45 [11-29 @ 00:26]  SCr 3.95 [11-28 @ 00:58]  SCr 3.81 [11-27 @ 00:33]  SCr 2.83 [11-26 @ 01:53]  SCr 2.53 [11-25 @ 20:05]    Urinalysis - [11-25-22 @ 10:37]      Color Yellow / Appearance Slightly Turbid / SG 1.027 / pH 6.0      Gluc Negative / Ketone Trace  / Bili Negative / Urobili Negative       Blood Moderate / Protein 30 mg/dL / Leuk Est Negative / Nitrite Negative      RBC 7 / WBC 7 / Hyaline 4 / Gran  / Sq Epi  / Non Sq Epi 2 / Bacteria Negative      TSH 0.60      [11-26-22 @ 05:24]  Lipid: chol --, , HDL --, LDL --      [11-26-22 @ 20:11]       [facility] Division of Kidney Diseases & Hypertension  FOLLOW UP NOTE  [phone]--------------------------------------------------------------------------------  Chief Complaint:Acute pancreatitis without infection or necrosis      HPI:  61 y/o male with PMH of CABG, DM, HTN, HLD presenting as transfer from [place] for c/f STEMI. Course c/b gallstone pancreatitis leading to ARDS and shock & cardiac arrest now on VA ECMO. Had significant troponin elevation and his LVEF down to 8%. Pt was deemed to be too unstable to have an angiogram and potential PCI due to his co-morbidities & a new subdural bleed. Nephrology called for ERIC. SCr on arrival was 2.15; trended down to hector 1.6 on 11/25; slowly trended up & peaked to 3.95 11/28; now trending down to 3.45 today. Pt is non oliguric. Last lasix dose was on 11/26        Patient seen & examined. Currently on VA ECMO. CVP 4. Off nicardipine. Milrinone added.  UOP 3.3L in 24hrs with net even fluid balance         PAST HISTORY  --------------------------------------------------------------------------------  No significant changes to PMH, PSH, FHx, SHx, unless otherwise noted    ALLERGIES & MEDICATIONS  --------------------------------------------------------------------------------  Allergies    No Known Allergies    Intolerances      Standing Inpatient Medications  argatroban Infusion 0.2 MICROgram(s)/kG/Min IV Continuous <Continuous>  artificial  tears Solution 1 Drop(s) Both EYES four times a day  atorvastatin 40 milliGRAM(s) Oral at bedtime  chlorhexidine 0.12% Liquid 15 milliLiter(s) Oral Mucosa every 12 hours  chlorhexidine 2% Cloths 1 Application(s) Topical <User Schedule>  chlorhexidine 4% Liquid 1 Application(s) Topical <User Schedule>  dexMEDEtomidine Infusion 1.5 MICROgram(s)/kG/Hr IV Continuous <Continuous>  dextrose 50% Injectable 50 milliLiter(s) IV Push every 15 minutes  furosemide   Injectable 20 milliGRAM(s) IV Push once  hydrocortisone sodium succinate Injectable 25 milliGRAM(s) IV Push every 8 hours  insulin lispro (ADMELOG) corrective regimen sliding scale   SubCutaneous every 6 hours  insulin regular Infusion 6 Unit(s)/Hr IV Continuous <Continuous>  meropenem  IVPB 500 milliGRAM(s) IV Intermittent every 12 hours  milrinone Infusion 0.25 MICROgram(s)/kG/Min IV Continuous <Continuous>  niCARdipine Infusion 2 mG/Hr IV Continuous <Continuous>  pantoprazole  Injectable 40 milliGRAM(s) IV Push two times a day  polyethylene glycol 3350 17 Gram(s) Oral daily  potassium chloride  10 mEq/50 mL IVPB 10 milliEquivalent(s) IV Intermittent every 1 hour  potassium chloride  10 mEq/50 mL IVPB 10 milliEquivalent(s) IV Intermittent every 1 hour  senna 2 Tablet(s) Oral at bedtime  sodium chloride 0.9%. 1000 milliLiter(s) IV Continuous <Continuous>    PRN Inpatient Medications  sodium chloride 0.9% lock flush 10 milliLiter(s) IV Push every 1 hour PRN      REVIEW OF SYSTEMS  --------------------------------------------------------------------------------  unable to obtain    VITALS/PHYSICAL EXAM  --------------------------------------------------------------------------------  T(C): 37 (11-29-22 @ 08:00), Max: 37 (11-28-22 @ 12:00)  HR: 73 (11-29-22 @ 10:00) (68 - 79)  BP: --  RR: 20 (11-29-22 @ 10:00) (14 - 23)  SpO2: 100% (11-29-22 @ 10:00) (97% - 100%)  Wt(kg): --        11-28-22 @ 07:01  -  11-29-22 @ 07:00  --------------------------------------------------------  IN: 3382.3 mL / OUT: 3340 mL / NET: 42.3 mL    11-29-22 @ 07:01  -  11-29-22 @ 10:04  --------------------------------------------------------  IN: 454.6 mL / OUT: 350 mL / NET: 104.6 mL      Physical Exam:  	Gen: intubated  	HEENT: +ET  	Pulm: CTA B/L  	CV: S1S2, no rub  	Abd: Soft, +BS          + presacral edema  	Ext: + LE edema B/L  	Neuro: sedated  	Skin: Warm and dry  	Vascular access: ECMO              +Va with red urine (Cyanokit)      LABS/STUDIES  --------------------------------------------------------------------------------              8.5    15.07 >-----------<  70       [11-29-22 @ 03:01]              25.4     153  |  119  |  68  ----------------------------<  130      [11-29-22 @ 00:26]  3.5   |  21  |  3.45        Ca     8.4     [11-29-22 @ 00:26]      Mg     2.5     [11-29-22 @ 00:26]      Phos  3.2     [11-29-22 @ 00:26]    TPro  6.0  /  Alb  3.3  /  TBili  0.8  /  DBili  0.3  /  AST  86  /  ALT  21  /  AlkPhos  68  [11-29-22 @ 00:26]    PT/INR: PT 11.6 , INR 1.01       [11-29-22 @ 00:24]  PTT: 41.0       [11-29-22 @ 00:24]          [11-29-22 @ 00:26]        [11-29-22 @ 00:26]    Creatinine Trend:  SCr 3.45 [11-29 @ 00:26]  SCr 3.95 [11-28 @ 00:58]  SCr 3.81 [11-27 @ 00:33]  SCr 2.83 [11-26 @ 01:53]  SCr 2.53 [11-25 @ 20:05]    Urinalysis - [11-25-22 @ 10:37]      Color Yellow / Appearance Slightly Turbid / SG 1.027 / pH 6.0      Gluc Negative / Ketone Trace  / Bili Negative / Urobili Negative       Blood Moderate / Protein 30 mg/dL / Leuk Est Negative / Nitrite Negative      RBC 7 / WBC 7 / Hyaline 4 / Gran  / Sq Epi  / Non Sq Epi 2 / Bacteria Negative      TSH 0.60      [11-26-22 @ 05:24]  Lipid: chol --, , HDL --, LDL --      [11-26-22 @ 20:11]       [facility] Division of Kidney Diseases & Hypertension  FOLLOW UP NOTE  [phone]--------------------------------------------------------------------------------  Chief Complaint:Acute pancreatitis without infection or necrosis      HPI:  63 y/o male with PMH of CABG, DM, HTN, HLD presenting as transfer from [place] for c/f STEMI. Course c/b gallstone pancreatitis leading to ARDS and shock & cardiac arrest now on [facility] ECMO. Had significant troponin elevation and his LVEF down to 8%. Pt was deemed to be too unstable to have an angiogram and potential PCI due to his co-morbidities & a new subdural bleed. Nephrology called for ERIC. SCr on arrival was 2.15; trended down to hector 1.6 on 11/25; slowly trended up & peaked to 3.95 11/28; now trending down to 3.45 today. Pt is non oliguric. Last lasix dose was on 11/26        Patient seen & examined. Currently on VA ECMO. CVP 4. Off nicardipine. Milrinone added.  UOP 3.3L in 24hrs with net even fluid balance         PAST HISTORY  --------------------------------------------------------------------------------  No significant changes to PMH, PSH, FHx, SHx, unless otherwise noted    ALLERGIES & MEDICATIONS  --------------------------------------------------------------------------------  Allergies    No Known Allergies    Intolerances      Standing Inpatient Medications  argatroban Infusion 0.2 MICROgram(s)/kG/Min IV Continuous <Continuous>  artificial  tears Solution 1 Drop(s) Both EYES four times a day  atorvastatin 40 milliGRAM(s) Oral at bedtime  chlorhexidine 0.12% Liquid 15 milliLiter(s) Oral Mucosa every 12 hours  chlorhexidine 2% Cloths 1 Application(s) Topical <User Schedule>  chlorhexidine 4% Liquid 1 Application(s) Topical <User Schedule>  dexMEDEtomidine Infusion 1.5 MICROgram(s)/kG/Hr IV Continuous <Continuous>  dextrose 50% Injectable 50 milliLiter(s) IV Push every 15 minutes  furosemide   Injectable 20 milliGRAM(s) IV Push once  hydrocortisone sodium succinate Injectable 25 milliGRAM(s) IV Push every 8 hours  insulin lispro (ADMELOG) corrective regimen sliding scale   SubCutaneous every 6 hours  insulin regular Infusion 6 Unit(s)/Hr IV Continuous <Continuous>  meropenem  IVPB 500 milliGRAM(s) IV Intermittent every 12 hours  milrinone Infusion 0.25 MICROgram(s)/kG/Min IV Continuous <Continuous>  niCARdipine Infusion 2 mG/Hr IV Continuous <Continuous>  pantoprazole  Injectable 40 milliGRAM(s) IV Push two times a day  polyethylene glycol 3350 17 Gram(s) Oral daily  potassium chloride  10 mEq/50 mL IVPB 10 milliEquivalent(s) IV Intermittent every 1 hour  potassium chloride  10 mEq/50 mL IVPB 10 milliEquivalent(s) IV Intermittent every 1 hour  senna 2 Tablet(s) Oral at bedtime  sodium chloride 0.9%. 1000 milliLiter(s) IV Continuous <Continuous>    PRN Inpatient Medications  sodium chloride 0.9% lock flush 10 milliLiter(s) IV Push every 1 hour PRN      REVIEW OF SYSTEMS  --------------------------------------------------------------------------------  unable to obtain    VITALS/PHYSICAL EXAM  --------------------------------------------------------------------------------  T(C): 37 (11-29-22 @ 08:00), Max: 37 (11-28-22 @ 12:00)  HR: 73 (11-29-22 @ 10:00) (68 - 79)  BP: --  RR: 20 (11-29-22 @ 10:00) (14 - 23)  SpO2: 100% (11-29-22 @ 10:00) (97% - 100%)  Wt(kg): --        11-28-22 @ 07:01  -  11-29-22 @ 07:00  --------------------------------------------------------  IN: 3382.3 mL / OUT: 3340 mL / NET: 42.3 mL    11-29-22 @ 07:01  -  11-29-22 @ 10:04  --------------------------------------------------------  IN: 454.6 mL / OUT: 350 mL / NET: 104.6 mL      Physical Exam:  	Gen: intubated  	HEENT: +ET  	Pulm: CTA B/L  	CV: S1S2, no rub  	Abd: Soft, +BS          + presacral edema  	Ext: + LE edema B/L  	Neuro: sedated  	Skin: Warm and dry  	Vascular access: ECMO              +Va with red urine (Cyanokit)      LABS/STUDIES  --------------------------------------------------------------------------------              8.5    15.07 >-----------<  70       [11-29-22 @ 03:01]              25.4     153  |  119  |  68  ----------------------------<  130      [11-29-22 @ 00:26]  3.5   |  21  |  3.45        Ca     8.4     [11-29-22 @ 00:26]      Mg     2.5     [11-29-22 @ 00:26]      Phos  3.2     [11-29-22 @ 00:26]    TPro  6.0  /  Alb  3.3  /  TBili  0.8  /  DBili  0.3  /  AST  86  /  ALT  21  /  AlkPhos  68  [11-29-22 @ 00:26]    PT/INR: PT 11.6 , INR 1.01       [11-29-22 @ 00:24]  PTT: 41.0       [11-29-22 @ 00:24]          [11-29-22 @ 00:26]        [11-29-22 @ 00:26]    Creatinine Trend:  SCr 3.45 [11-29 @ 00:26]  SCr 3.95 [11-28 @ 00:58]  SCr 3.81 [11-27 @ 00:33]  SCr 2.83 [11-26 @ 01:53]  SCr 2.53 [11-25 @ 20:05]    Urinalysis - [11-25-22 @ 10:37]      Color Yellow / Appearance Slightly Turbid / SG 1.027 / pH 6.0      Gluc Negative / Ketone Trace  / Bili Negative / Urobili Negative       Blood Moderate / Protein 30 mg/dL / Leuk Est Negative / Nitrite Negative      RBC 7 / WBC 7 / Hyaline 4 / Gran  / Sq Epi  / Non Sq Epi 2 / Bacteria Negative      TSH 0.60      [11-26-22 @ 05:24]  Lipid: chol --, , HDL --, LDL --      [11-26-22 @ 20:11]

## 2022-11-29 NOTE — PROGRESS NOTE ADULT - PROBLEM SELECTOR PLAN 5
- likely arrest associated ATN   - UOP is good, urinated 3.3L over last 24hr, Cr improving  - no indication for dialysis  - renal following.

## 2022-11-29 NOTE — PROGRESS NOTE ADULT - PROBLEM SELECTOR PLAN 2
likely from poor free water access & loop diuretics.     4.3L free water deficit. Replace half in 24 hrs. Would give 300ml free water q6 hrs via NGT. If need loops for diuresis then can add thiazides to treat the hyponatremia.      If you have any questions, please feel free to contact me via [employer] teams  Marielle Santos  Nephrology Fellow   Pager NS: [phone]/ [facility]: [phone]    (After 5 pm or on weekends please page the on-call fellow, can check [email or web address] for schedule. Login is carmen cantor, schedule under [facility] medicine, psych, derm). likely from poor free water access & loop diuretics.     4.3L free water deficit. Replace half in 24 hrs. Would give 300ml free water q6 hrs via NGT. If need loops for diuresis then can add thiazides to treat the hyponatremia.      If you have any questions, please feel free to contact me via [employer] teams  Marielle Santos  Nephrology Fellow   Pager NS: [phone]/ [facility]: [phone]    (After 5 pm or on weekends please page the on-call fellow, can check [email or web address] for schedule. Login is carmen cantor, schedule under [facility] medicine, psych, derm). likely from poor free water access & loop diuretics.     4.3L free water deficit. Replace half in 24 hrs. Would give 300ml free water q6 hrs via NGT. If need loops for diuresis then can add thiazides to treat the hyponatremia.      If you have any questions, please feel free to contact me via [employer] teams  Marielle Santos  Nephrology Fellow   Pager NS: [phone]/ [facility]: [phone]    (After 5 pm or on weekends please page the on-call fellow, can check [email or web address] for schedule. Login is carmen cantor, schedule under [facility] medicine, psych, derm). likely from poor free water access & loop diuretics.     4.3L free water deficit. Replace half in 24 hrs. Would give 300ml free water q6 hrs via NGT. If need loops for diuresis then can add thiazides to treat the hyponatremia. Check [facility] to look for DI      If you have any questions, please feel free to contact me via [employer] teams  Marielle Santos  Nephrology Fellow   Pager NS: [phone]/ [facility]: [phone]    (After 5 pm or on weekends please page the on-call fellow, can check [email or web address] for schedule. Login is carmen cantor, schedule under [facility] medicine, psych, derm). likely from poor free water access & loop diuretics.     4.3L free water deficit. Replace half in 24 hrs. Would give 300ml free water q6 hrs via NGT. If need loops for diuresis then can add thiazides to treat the hyponatremia. Check Uosm to look for DI      If you have any questions, please feel free to contact me via [employer] teams  Marielle Santos  Nephrology Fellow   Pager NS: [phone]/ [facility]: [phone]    (After 5 pm or on weekends please page the on-call fellow, can check [email or web address] for schedule. Login is carmen cantor, schedule under [facility] medicine, psych, derm). likely from poor free water access & loop diuretics.     4.3L free water deficit. Replace half in 24 hrs. Would give 300ml free water q6 hrs via NGT. If need loops for diuresis then can add thiazides to treat the hyponatremia. Check Uosm to look for DI      If you have any questions, please feel free to contact me via [employer] teams  Marielle Santos  Nephrology Fellow   Pager NS: [phone]/ [facility]: [phone]    (After 5 pm or on weekends please page the on-call fellow, can check [email or web address] for schedule. Login is carmen cantor, schedule under [facility] medicine, psych, derm).

## 2022-11-29 NOTE — GOALS OF CARE CONVERSATION - ADVANCED CARE PLANNING - CONVERSATION DETAILS
GAP consulted to assist in GOC discussion in the setting of patient with advanced illness currently placed on [facility]. IDT meeting held with patient's family today. Team utilized Latvian  through [employer] for patient's spouse, CHELITA Javier, [phone].    Team first introduced selves and roles in patient care. Family verbalized understanding and was receptive to meeting today. Team next inquired into family understanding of patient's current medical status, and patient's daughter, who is a physician, demonstrates good understanding. Family states that patient with gallstones and pancreatitis and note this leading to patient's respiratory distress and subsequent cardiac arrest. Family states that patient additionally with SDH and ERIC, and is now s/p ecmo placement and mechanical ventilation.     Team confirmed this today, and provided additional overview for family. Team discussed patient's cardiogenic shock, HF, and ARDS, and discussed ecmo further at length today as well. Team discussed that ecmo is helping to support patient's heart and lungs, but discussed it as a short-term intervention with risks including clotting and bleeding, which can further impact patient's SDH, though it is stable at present. Family verbalized understanding, and inquired into patient's ability to improve. Team discussed that patient will remain on ecmo at present and that team will continue to monitor progress with these organs and patient's kidneys as well, assessing the opportunity for recovery in the coming days. Team discussed being hopeful for recovery, but note that, should patient be unable to meaningfully recover from this status, ecmo cannot remain permanently. Team discussed plan moving forward at present is continued monitoring as well as possible future cardiac cath. Family verbalized understanding, and all questions answered today.    Patient's family appropriately tearful during meeting today. Patient's daughter discussed the difficulty in navigating her roles as a physician and as a daughter experiencing a loved one's health crisis, and notes frustrations experienced throughout. Family additionally notes much shock regarding patient's status, statin that patient with hx of of bypass in 2019, but states that patient was otherwise healthy and functional. Team validated family in the difficulty in this situation today, and much emotional support, active listening, and room for ventilation of feelings provided. Team assured ongoing support and availability as well.     Team inquired into spiritual support, as family identifies as Religion, but family declines at this time, stating they have been saying prayers for patient with their community.    Team lastly inquired into decision-making for patient, and patient's spouse defers to her daughter Deng at this time.    GAP will continue to follow this case, with plan for follow up meeting 12/6 at 1:30. GAP consulted to assist in GOC discussion in the setting of patient with advanced illness currently placed on [facility] ECMO. IDT meeting held with patient's family today. Team utilized Pashto  through [employer] for patient's spouse, CHELITA Javier, [phone].    Team first introduced selves and roles in patient care. Family verbalized understanding and was receptive to meeting today. Team next inquired into family understanding of patient's current medical status, and patient's daughter, who is a physician, demonstrates good understanding. Family states that patient with gallstones and pancreatitis and note this leading to patient's respiratory distress and subsequent cardiac arrest. Family states that patient additionally with SDH and ERIC, and is now s/p ecmo placement and mechanical ventilation.     Team confirmed this today, and provided additional overview for family. Team discussed patient's cardiogenic shock, HF, and ARDS, and discussed ecmo further at length today as well. Team discussed that ecmo is helping to support patient's heart and lungs, but discussed it as a short-term intervention with risks including clotting and bleeding, which can further impact patient's SDH, though it is stable at present. Family verbalized understanding, and inquired into patient's ability to improve. Team discussed that patient will remain on ecmo at present and that team will continue to monitor progress with these organs and patient's kidneys as well, assessing the opportunity for recovery in the coming days. Team discussed being hopeful for recovery, but note that, should patient be unable to meaningfully recover from this status, ecmo cannot remain permanently. Team discussed plan moving forward at present is continued monitoring as well as possible future cardiac cath. Family verbalized understanding, and all questions answered today.    Patient's family appropriately tearful during meeting today. Patient's daughter discussed the difficulty in navigating her roles as a physician and as a daughter experiencing a loved one's health crisis, and notes frustrations experienced throughout. Family additionally notes much shock regarding patient's status, statin that patient with hx of of bypass in 2019, but states that patient was otherwise healthy and functional. Team validated family in the difficulty in this situation today, and much emotional support, active listening, and room for ventilation of feelings provided. Team assured ongoing support and availability as well.     Team inquired into spiritual support, as family identifies as Hindu, but family declines at this time, stating they have been saying prayers for patient with their community.    Team lastly inquired into decision-making for patient, and patient's spouse defers to her daughter Deng at this time.    GAP will continue to follow this case, with plan for follow up meeting 12/6 at 1:30. GAP consulted to assist in GOC discussion in the setting of patient with advanced illness currently placed on [facility] ECMO. IDT meeting held with patient's family today. Team utilized Estonian  through [employer] for patient's spouse, CHELITA Javier, [phone].    Team first introduced selves and roles in patient care. Family verbalized understanding and was receptive to meeting today. Team next inquired into family understanding of patient's current medical status, and patient's daughter, who is a physician, demonstrates good understanding. Family states that patient with gallstones and pancreatitis and note this leading to patient's respiratory distress and subsequent cardiac arrest. Family states that patient additionally with SDH and ERIC, and is now s/p ecmo placement and mechanical ventilation.     Team confirmed this today, and provided additional overview for family. Team discussed patient's cardiogenic shock, HF, and ARDS, and discussed ecmo further at length today as well. Team discussed that ecmo is helping to support patient's heart and lungs, but discussed it as a short-term intervention with risks including clotting and bleeding, which can further impact patient's SDH, though it is stable at present. Family verbalized understanding, and inquired into patient's ability to improve. Team discussed that patient will remain on ecmo at present and that team will continue to monitor progress with these organs and patient's kidneys as well, assessing the opportunity for recovery in the coming days. Team discussed being hopeful for recovery, but note that, should patient be unable to meaningfully recover from this status, ecmo cannot remain permanently. Team discussed plan moving forward at present is continued monitoring as well as possible future cardiac cath. Family verbalized understanding, and all questions answered today.    Patient's family appropriately tearful during meeting today. Patient's daughter discussed the difficulty in navigating her roles as a physician and as a daughter experiencing a loved one's health crisis, and notes frustrations experienced throughout. Family additionally notes much shock regarding patient's status, statin that patient with hx of of bypass in 2019, but states that patient was otherwise healthy and functional. Team validated family in the difficulty in this situation today, and much emotional support, active listening, and room for ventilation of feelings provided. Team assured ongoing support and availability as well.     Team inquired into spiritual support, as family identifies as Anabaptist, but family declines at this time, stating they have been saying prayers for patient with their community.    Team lastly inquired into decision-making for patient, and patient's spouse defers to her daughter Deng at this time.    GAP will continue to follow this case, with plan for follow up meeting 12/6 at 1:30.

## 2022-11-29 NOTE — PROGRESS NOTE ADULT - PROBLEM SELECTOR PLAN 1
- ECMO weaned slightly today to 3.7LPM 3500RPM  - please check left radial ABG   - LV showing some signs of improvement  - good pulsatility  - continue argatroban on ECMO  - deferring inotropes for now given recent ischemia and opening AV, will start if needed  - cardene PRN for goal MAP 65-75   - advanced therapies evaluation premature at this time given hope for recovery and progress at this time.  - wean steroids  - diurese to goal net negative 2L - continue [facility] ECMO (LFV/RFA, no anterograde perfusion), today weaned from 4.0 L/min to 3.5 L/min. Sweep currently at 4 and will wean as tolerates. needs close monitoring of distal pulses  - will do bedside TTE turndown tomorrow but he will need LHC prior to decannulation   - please check left and R radial ABG to look for signs of mixing   - continue argatroban   - repeat TTE tomorrow to look for continued signs of improvement   - deferring inotropes for now given recent ischemia and opening AV, will start if needed  - cardene PRN for goal MAP 65-75   - advanced therapies evaluation premature at this time given hope for recovery and progress at this time.  - wean steroids  - diurese to goal net negative 2L

## 2022-11-29 NOTE — PROGRESS NOTE ADULT - CRITICAL CARE ATTENDING COMMENT
Weaned ECMO flows further from 4.0 L/min to 3.5 L/min. His pulsatility continues to improve. His CXR shows edema and PAD rising so will start diuretics and titrate to keep negative 2 L daily. Cr improving and UOP excellent. Will perform bedside TTE turndown study tomorrow though will need LHC prior to consideration of decannulation. Weaned ECMO flows further from 4.0 L/min to 3.5 L/min. His pulsatility continues to improve. His CXR shows edema and PAD rising so will start diuretics and titrate to keep negative 2 L daily. Cr improving and UOP excellent. Will perform bedside TTE turndown study tomorrow though will need LHC prior to consideration of decannulation. I suspect he may be mixing, optimize vent settings and check simultaneous R and L radial ABGs.

## 2022-11-29 NOTE — PROGRESS NOTE ADULT - SUBJECTIVE AND OBJECTIVE BOX
infectious diseases progress note:    Patient is a 62y old  Male who presents with a chief complaint of Acute cholecystitis, gallstone pancreatitis (28 Nov 2022 23:09)        Acute pancreatitis without infection or necrosis          ROS:       Allergies    No Known Allergies    Intolerances        ANTIBIOTICS/RELEVANT:  antimicrobials  meropenem  IVPB 500 milliGRAM(s) IV Intermittent every 12 hours    immunologic:    OTHER:  artificial  tears Solution 1 Drop(s) Both EYES four times a day  chlorhexidine 0.12% Liquid 15 milliLiter(s) Oral Mucosa every 12 hours  chlorhexidine 2% Cloths 1 Application(s) Topical <User Schedule>  chlorhexidine 4% Liquid 1 Application(s) Topical <User Schedule>  dexMEDEtomidine Infusion 1.5 MICROgram(s)/kG/Hr IV Continuous <Continuous>  dextrose 50% Injectable 50 milliLiter(s) IV Push every 15 minutes  heparin  Infusion 500 Unit(s)/Hr IV Continuous <Continuous>  hydrocortisone sodium succinate Injectable 25 milliGRAM(s) IV Push every 8 hours  insulin lispro (ADMELOG) corrective regimen sliding scale   SubCutaneous every 6 hours  insulin regular Infusion 6 Unit(s)/Hr IV Continuous <Continuous>  milrinone Infusion 0.25 MICROgram(s)/kG/Min IV Continuous <Continuous>  niCARdipine Infusion 2 mG/Hr IV Continuous <Continuous>  pantoprazole  Injectable 40 milliGRAM(s) IV Push two times a day  polyethylene glycol 3350 17 Gram(s) Oral daily  senna 2 Tablet(s) Oral at bedtime  sodium chloride 0.9% lock flush 10 milliLiter(s) IV Push every 1 hour PRN  sodium chloride 0.9%. 1000 milliLiter(s) IV Continuous <Continuous>      Objective:  Vital Signs Last 24 Hrs  T(C): 36.8 (29 Nov 2022 07:30), Max: 37 (28 Nov 2022 12:00)  T(F): 98.2 (29 Nov 2022 07:30), Max: 98.6 (28 Nov 2022 12:00)  HR: 72 (29 Nov 2022 08:15) (68 - 76)  BP: --  BP(mean): --  RR: 21 (29 Nov 2022 08:15) (14 - 23)  SpO2: 97% (29 Nov 2022 08:15) (97% - 100%)    Parameters below as of 29 Nov 2022 04:00  Patient On (Oxygen Delivery Method): ventilator    O2 Concentration (%): 40       Eyes:INOCENCIO, EOMI  Ear/Nose/Throat: no oral lesion, no sinus tenderness on percussion	  Neck:no JVD, no lymphadenopathy, supple  Respiratory: CTA lore  Cardiovascular: S1S2 RRR, no murmurs  Gastrointestinal:soft, (+) BS, no HSM  Extremities:no e/e/c        LABS:                        8.5    15.07 )-----------( 70       ( 29 Nov 2022 03:01 )             25.4     11-29    153<H>  |  119<H>  |  68<H>  ----------------------------<  130<H>  3.5   |  21<L>  |  3.45<H>    Ca    8.4      29 Nov 2022 00:26  Phos  3.2     11-29  Mg     2.5     11-29    TPro  6.1  /  Alb  3.2<L>  /  TBili  0.8  /  DBili  x   /  AST  228<H>  /  ALT  72<H>  /  AlkPhos  63  11-28    PT/INR - ( 29 Nov 2022 00:24 )   PT: 11.6 sec;   INR: 1.01 ratio         PTT - ( 29 Nov 2022 00:24 )  PTT:41.0 sec        MICROBIOLOGY:    RECENT CULTURES:  11-26 @ 18:11 ET Tube ET Tube       Moderate polymorphonuclear leukocytes per low power field  No Squamous epithelial cells per low power field  No organisms seen per oil power field           No growth at 48 hours    11-25 @ 22:55 ET Tube ET Tube       No polymorphonuclear leukocytes seen per low power field  No Squamous epithelial cells seen per low power field  No organisms seen per oil power field           No growth at 48 hours    11-25 @ 22:10 .Blood Blood-Peripheral                No growth to date.    11-25 @ 21:15 .Blood Blood-Peripheral                No growth to date.    11-25 @ 10:39 .Blood Blood-Peripheral                No growth to date.    11-25 @ 10:37 .Blood Blood-Peripheral                No growth to date.          RESPIRATORY CULTURES:              RADIOLOGY & ADDITIONAL STUDIES:        Pager [phone]  After 5 pm/weekends or if no response :[phone] infectious diseases progress note:    Patient is a 62y old  Male who presents with a chief complaint of Acute cholecystitis, gallstone pancreatitis (28 Nov 2022 23:09)        Acute pancreatitis without infection or necrosis          ROS:       Allergies    No Known Allergies    Intolerances        ANTIBIOTICS/RELEVANT:  antimicrobials  meropenem  IVPB 500 milliGRAM(s) IV Intermittent every 12 hours    immunologic:    OTHER:  artificial  tears Solution 1 Drop(s) Both EYES four times a day  chlorhexidine 0.12% Liquid 15 milliLiter(s) Oral Mucosa every 12 hours  chlorhexidine 2% Cloths 1 Application(s) Topical <User Schedule>  chlorhexidine 4% Liquid 1 Application(s) Topical <User Schedule>  dexMEDEtomidine Infusion 1.5 MICROgram(s)/kG/Hr IV Continuous <Continuous>  dextrose 50% Injectable 50 milliLiter(s) IV Push every 15 minutes  heparin  Infusion 500 Unit(s)/Hr IV Continuous <Continuous>  hydrocortisone sodium succinate Injectable 25 milliGRAM(s) IV Push every 8 hours  insulin lispro (ADMELOG) corrective regimen sliding scale   SubCutaneous every 6 hours  insulin regular Infusion 6 Unit(s)/Hr IV Continuous <Continuous>  milrinone Infusion 0.25 MICROgram(s)/kG/Min IV Continuous <Continuous>  niCARdipine Infusion 2 mG/Hr IV Continuous <Continuous>  pantoprazole  Injectable 40 milliGRAM(s) IV Push two times a day  polyethylene glycol 3350 17 Gram(s) Oral daily  senna 2 Tablet(s) Oral at bedtime  sodium chloride 0.9% lock flush 10 milliLiter(s) IV Push every 1 hour PRN  sodium chloride 0.9%. 1000 milliLiter(s) IV Continuous <Continuous>      Objective:  Vital Signs Last 24 Hrs  T(C): 36.8 (29 Nov 2022 07:30), Max: 37 (28 Nov 2022 12:00)  T(F): 98.2 (29 Nov 2022 07:30), Max: 98.6 (28 Nov 2022 12:00)  HR: 72 (29 Nov 2022 08:15) (68 - 76)  BP: --  BP(mean): --  RR: 21 (29 Nov 2022 08:15) (14 - 23)  SpO2: 97% (29 Nov 2022 08:15) (97% - 100%)    Parameters below as of 29 Nov 2022 04:00  Patient On (Oxygen Delivery Method): ventilator    O2 Concentration (%): 40       Eyes:INOCENCIO, EOMI  Ear/Nose/Throat: no oral lesion, no sinus tenderness on percussion	  Neck:no JVD, no lymphadenopathy, supple  Respiratory: CTA lore  Cardiovascular: S1S2 RRR, no murmurs  Gastrointestinal:soft, (+) BS, no HSM  Extremities:no e/e/c        LABS:                        8.5    15.07 )-----------( 70       ( 29 Nov 2022 03:01 )             25.4     11-29    153<H>  |  119<H>  |  68<H>  ----------------------------<  130<H>  3.5   |  21<L>  |  3.45<H>    Ca    8.4      29 Nov 2022 00:26  Phos  3.2     11-29  Mg     2.5     11-29    TPro  6.1  /  Alb  3.2<L>  /  TBili  0.8  /  DBili  x   /  AST  228<H>  /  ALT  72<H>  /  AlkPhos  63  11-28    PT/INR - ( 29 Nov 2022 00:24 )   PT: 11.6 sec;   INR: 1.01 ratio         PTT - ( 29 Nov 2022 00:24 )  PTT:41.0 sec        MICROBIOLOGY:    RECENT CULTURES:  11-26 @ 18:11 ET Tube ET Tube       Moderate polymorphonuclear leukocytes per low power field  No Squamous epithelial cells per low power field  No organisms seen per oil power field           No growth at 48 hours    11-25 @ 22:55 ET Tube ET Tube       No polymorphonuclear leukocytes seen per low power field  No Squamous epithelial cells seen per low power field  No organisms seen per oil power field           No growth at 48 hours    11-25 @ 22:10 .Blood Blood-Peripheral                No growth to date.    11-25 @ 21:15 .Blood Blood-Peripheral                No growth to date.    11-25 @ 10:39 .Blood Blood-Peripheral                No growth to date.    11-25 @ 10:37 .Blood Blood-Peripheral                No growth to date.          RESPIRATORY CULTURES:              RADIOLOGY & ADDITIONAL STUDIES:        Pager [phone]  After 5 pm/weekends or if no response :[phone] infectious diseases progress note:    Patient is a 62y old  Male who presents with a chief complaint of Acute cholecystitis, gallstone pancreatitis (28 Nov 2022 23:09)        Acute pancreatitis without infection or necrosis          ROS:       Allergies    No Known Allergies    Intolerances        ANTIBIOTICS/RELEVANT:  antimicrobials  meropenem  IVPB 500 milliGRAM(s) IV Intermittent every 12 hours    immunologic:    OTHER:  artificial  tears Solution 1 Drop(s) Both EYES four times a day  chlorhexidine 0.12% Liquid 15 milliLiter(s) Oral Mucosa every 12 hours  chlorhexidine 2% Cloths 1 Application(s) Topical <User Schedule>  chlorhexidine 4% Liquid 1 Application(s) Topical <User Schedule>  dexMEDEtomidine Infusion 1.5 MICROgram(s)/kG/Hr IV Continuous <Continuous>  dextrose 50% Injectable 50 milliLiter(s) IV Push every 15 minutes  heparin  Infusion 500 Unit(s)/Hr IV Continuous <Continuous>  hydrocortisone sodium succinate Injectable 25 milliGRAM(s) IV Push every 8 hours  insulin lispro (ADMELOG) corrective regimen sliding scale   SubCutaneous every 6 hours  insulin regular Infusion 6 Unit(s)/Hr IV Continuous <Continuous>  milrinone Infusion 0.25 MICROgram(s)/kG/Min IV Continuous <Continuous>  niCARdipine Infusion 2 mG/Hr IV Continuous <Continuous>  pantoprazole  Injectable 40 milliGRAM(s) IV Push two times a day  polyethylene glycol 3350 17 Gram(s) Oral daily  senna 2 Tablet(s) Oral at bedtime  sodium chloride 0.9% lock flush 10 milliLiter(s) IV Push every 1 hour PRN  sodium chloride 0.9%. 1000 milliLiter(s) IV Continuous <Continuous>      Objective:  Vital Signs Last 24 Hrs  T(C): 36.8 (29 Nov 2022 07:30), Max: 37 (28 Nov 2022 12:00)  T(F): 98.2 (29 Nov 2022 07:30), Max: 98.6 (28 Nov 2022 12:00)  HR: 72 (29 Nov 2022 08:15) (68 - 76)  BP: --  BP(mean): --  RR: 21 (29 Nov 2022 08:15) (14 - 23)  SpO2: 97% (29 Nov 2022 08:15) (97% - 100%)    Parameters below as of 29 Nov 2022 04:00  Patient On (Oxygen Delivery Method): ventilator    O2 Concentration (%): 40       Eyes:INOCENCIO, EOMI  Ear/Nose/Throat: no oral lesion, no sinus tenderness on percussion	  Neck:no JVD, no lymphadenopathy, supple  Respiratory: CTA lore  Cardiovascular: S1S2 RRR, no murmurs  Gastrointestinal:soft, (+) BS, no HSM  Extremities:no e/e/c        LABS:                        8.5    15.07 )-----------( 70       ( 29 Nov 2022 03:01 )             25.4     11-29    153<H>  |  119<H>  |  68<H>  ----------------------------<  130<H>  3.5   |  21<L>  |  3.45<H>    Ca    8.4      29 Nov 2022 00:26  Phos  3.2     11-29  Mg     2.5     11-29    TPro  6.1  /  Alb  3.2<L>  /  TBili  0.8  /  DBili  x   /  AST  228<H>  /  ALT  72<H>  /  AlkPhos  63  11-28    PT/INR - ( 29 Nov 2022 00:24 )   PT: 11.6 sec;   INR: 1.01 ratio         PTT - ( 29 Nov 2022 00:24 )  PTT:41.0 sec        MICROBIOLOGY:    RECENT CULTURES:  11-26 @ 18:11 ET Tube ET Tube       Moderate polymorphonuclear leukocytes per low power field  No Squamous epithelial cells per low power field  No organisms seen per oil power field           No growth at 48 hours    11-25 @ 22:55 ET Tube ET Tube       No polymorphonuclear leukocytes seen per low power field  No Squamous epithelial cells seen per low power field  No organisms seen per oil power field           No growth at 48 hours    11-25 @ 22:10 .Blood Blood-Peripheral                No growth to date.    11-25 @ 21:15 .Blood Blood-Peripheral                No growth to date.    11-25 @ 10:39 .Blood Blood-Peripheral                No growth to date.    11-25 @ 10:37 .Blood Blood-Peripheral                No growth to date.          RESPIRATORY CULTURES:              RADIOLOGY & ADDITIONAL STUDIES:        Pager [phone]  After 5 pm/weekends or if no response :[phone]

## 2022-11-29 NOTE — PROGRESS NOTE ADULT - SUBJECTIVE AND OBJECTIVE BOX
Patient seen and examined at the bedside.    Remained critically ill on continuous ICU monitoring.    OBJECTIVE:  Vital Signs Last 24 Hrs  T(C): 37.1 (29 Nov 2022 20:00), Max: 37.1 (29 Nov 2022 16:00)  T(F): 98.8 (29 Nov 2022 20:00), Max: 98.8 (29 Nov 2022 16:00)  HR: 69 (29 Nov 2022 20:30) (64 - 79)  BP: --  BP(mean): --  RR: 14 (29 Nov 2022 20:30) (14 - 32)  SpO2: 100% (29 Nov 2022 20:30) (97% - 100%)    Parameters below as of 29 Nov 2022 20:00  Patient On (Oxygen Delivery Method): ventilator    O2 Concentration (%): 50      Physical Exam:   General: Intubated, multiple lines gtt  Neurology: sedated > on sedation vacation woke up and followed commands  Eyes: bilateral pupils equal and reactive   ENT/Neck: +ETT midline, Neck supple, trachea midline, No JVD   Respiratory: Clear bilaterally   CV: S1S2, no murmurs        [x] Sinus rhythm  Abdominal: Soft, NT, ND +BS   Extremities: 1-2+ pedal edema noted, + peripheral pulses   Skin: No Rashes, Hematoma, Ecchymosis                            Assessment:  63 y/o male with PMH of CABG, DM, HTN, HLD presenting as transfer from [place] for c/f STEMI. PTA arrival received 325 aspirin, 600 plavix, and no heparin drip started. Around 1:30 am patient had multiple episodes of non-bloody diarrhea and emesis with associated abdominal pain and chest pain, unable to localize, non-radiating, with associated SOB and no associated palpitations or diaphoresis. No recent fevers/chills, cough, rashes, or changes in urination. Does report mild diffuse back pain; started 5 days ago in setting on injury while walking and lifting objects. Denies focal weakness, numbness/tingling, or LOC due does report mild dizziness.    Cardiac arrest s/p [facility] ECMO 11/25  Hemodynamic instability  Hypovolemia  Post op respiratory insufficiency  Thrombocytopenia  Acute blood loss anemia  Leukocytosis         Plan:   ***Neuro***  CT head showed 4mm subdural hematoma 11/26: repeat CT head unchanged   [x] Sedated with [x] Precedex   Post operative neuro assessment       ***Cardiovascular***  11/28 TTE: EF 25%, Severe LV dysfunction  Invasive hemodynamic monitoring, assess perfusion indices   SR / CVP 7/ MAP 82/ PAP 37/10 (24)/ CI 1.6/ Hct 24.1/ Lactate 0.8  [x] Cardene - 2mg/hr   [x] ECMO- 3300 rpms, 3.2 flow  Reassessment of hemodynamics post resuscitation   [x] AC Therapy with Argatroban gtt  Went into Bradycardic and Hypoxic arrest 11/25  Serial EKG and cardiac enzymes       ***Pulmonary***  [x]  Nitric oxide- 20 ppm   Post op vent management   Titration of FiO2 and PEEP, follow SpO2, CXR, blood gasses   100% FiO2 on ECMO  Titrate sweep for pco2 and ph      Mode: AC/ CMV (Assist Control/ Continuous Mandatory Ventilation)  RR (machine): 14  FiO2: 50  PEEP: 10  ITime: 2  MAP: 12  PC: 10  PIP: 20              ***GI***  [x] Tolerating TF Vital AF, @ goal 10cc/hr.   [x] Protonix    Bowel regimen with Miralax and senna       ***Renal***  Continue to monitor I/Os, BUN/Creatinine.   Replete lytes PRN  De Dios present  Given Lasix 20 x1   C/w Free water 250 q8     ***ID***  Meropenem for empiric coverage  Follow up cultures    ***Endocrine***  [x]  DM2: HbA1c 7.3%                - [x] Insulin gtt              - Need tight glycemic control to prevent wound infection.        Patient requires continuous monitoring with bedside rhythm monitoring, pulse oximetry monitoring, and continuous central venous and arterial pressure monitoring; and intermittent blood gas analysis. Care plan discussed with the ICU care team.   Patient remained critical, at risk for life threatening decompensation.    I have spent 30 minutes providing critical care management to this patient.    By signing my name below, I, Abhijit Ngo, attest that this documentation has been prepared under the direction and in the presence of ___  Electronically signed: Brian Slaughter, 11-29-22 @ 20:42    I, ___ , personally performed the services described in this documentation. all medical record entries made by the karlaibe were at my direction and in my presence. I have reviewed the chart and agree that the record reflects my personal performance and is accurate and complete  Electronically signed: ___ Patient seen and examined at the bedside.    Remained critically ill on continuous ICU monitoring.    OBJECTIVE:  Vital Signs Last 24 Hrs  T(C): 37.1 (29 Nov 2022 20:00), Max: 37.1 (29 Nov 2022 16:00)  T(F): 98.8 (29 Nov 2022 20:00), Max: 98.8 (29 Nov 2022 16:00)  HR: 69 (29 Nov 2022 20:30) (64 - 79)  BP: --  BP(mean): --  RR: 14 (29 Nov 2022 20:30) (14 - 32)  SpO2: 100% (29 Nov 2022 20:30) (97% - 100%)    Parameters below as of 29 Nov 2022 20:00  Patient On (Oxygen Delivery Method): ventilator    O2 Concentration (%): 50      Physical Exam:   General: Intubated, multiple lines gtt  Neurology: sedated > on sedation vacation woke up and followed commands  Eyes: bilateral pupils equal and reactive   ENT/Neck: +ETT midline, Neck supple, trachea midline, No JVD   Respiratory: Clear bilaterally   CV: S1S2, no murmurs        [x] Sinus rhythm  Abdominal: Soft, NT, ND +BS   Extremities: 1-2+ pedal edema noted, + peripheral pulses   Skin: No Rashes, Hematoma, Ecchymosis                            Assessment:  63 y/o male with PMH of CABG, DM, HTN, HLD presenting as transfer from [place] for c/f STEMI. PTA arrival received 325 aspirin, 600 plavix, and no heparin drip started. Around 1:30 am patient had multiple episodes of non-bloody diarrhea and emesis with associated abdominal pain and chest pain, unable to localize, non-radiating, with associated SOB and no associated palpitations or diaphoresis. No recent fevers/chills, cough, rashes, or changes in urination. Does report mild diffuse back pain; started 5 days ago in setting on injury while walking and lifting objects. Denies focal weakness, numbness/tingling, or LOC due does report mild dizziness.    Cardiac arrest s/p [facility] ECMO 11/25  Hemodynamic instability  Hypovolemia  Post op respiratory insufficiency  Thrombocytopenia  Acute blood loss anemia  Leukocytosis         Plan:   ***Neuro***  CT head showed 4mm subdural hematoma 11/26: repeat CT head unchanged   [x] Sedated with [x] Precedex   Post operative neuro assessment       ***Cardiovascular***  11/28 TTE: EF 25%, Severe LV dysfunction  Invasive hemodynamic monitoring, assess perfusion indices   SR / CVP 7/ MAP 82/ PAP 37/10 (24)/ CI 1.6/ Hct 24.1/ Lactate 0.8  [x] Cardene - 2mg/hr   [x] ECMO- 3300 rpms, 3.2 flow  Reassessment of hemodynamics post resuscitation   [x] AC Therapy with Argatroban gtt  Went into Bradycardic and Hypoxic arrest 11/25  Serial EKG and cardiac enzymes       ***Pulmonary***  [x]  Nitric oxide- 20 ppm   Post op vent management   Titration of FiO2 and PEEP, follow SpO2, CXR, blood gasses   100% FiO2 on ECMO  Titrate sweep for pco2 and ph      Mode: AC/ CMV (Assist Control/ Continuous Mandatory Ventilation)  RR (machine): 14  FiO2: 50  PEEP: 10  ITime: 2  MAP: 12  PC: 10  PIP: 20              ***GI***  [x] Tolerating TF Vital AF, @ goal 10cc/hr.   [x] Protonix    Bowel regimen with Miralax and senna       ***Renal***  Continue to monitor I/Os, BUN/Creatinine.   Replete lytes PRN  De Dios present  Given Lasix 20 x1   C/w Free water 250 q8     ***ID***  Meropenem for empiric coverage  Follow up cultures    ***Endocrine***  [x]  DM2: HbA1c 7.3%                - [x] Insulin gtt              - Need tight glycemic control to prevent wound infection.        Patient requires continuous monitoring with bedside rhythm monitoring, pulse oximetry monitoring, and continuous central venous and arterial pressure monitoring; and intermittent blood gas analysis. Care plan discussed with the ICU care team.   Patient remained critical, at risk for life threatening decompensation.    I have spent 30 minutes providing critical care management to this patient.    By signing my name below, I, Abhijit Ngo, attest that this documentation has been prepared under the direction and in the presence of ___  Electronically signed: Brian Slaughter, 11-29-22 @ 20:42    I, ___ , personally performed the services described in this documentation. all medical record entries made by the karlaibe were at my direction and in my presence. I have reviewed the chart and agree that the record reflects my personal performance and is accurate and complete  Electronically signed: ___ Patient seen and examined at the bedside.    Remained critically ill on continuous ICU monitoring.    OBJECTIVE:  Vital Signs Last 24 Hrs  T(C): 37.1 (29 Nov 2022 20:00), Max: 37.1 (29 Nov 2022 16:00)  T(F): 98.8 (29 Nov 2022 20:00), Max: 98.8 (29 Nov 2022 16:00)  HR: 69 (29 Nov 2022 20:30) (64 - 79)  BP: --  BP(mean): --  RR: 14 (29 Nov 2022 20:30) (14 - 32)  SpO2: 100% (29 Nov 2022 20:30) (97% - 100%)    Parameters below as of 29 Nov 2022 20:00  Patient On (Oxygen Delivery Method): ventilator    O2 Concentration (%): 50      Physical Exam:   General: Intubated, multiple lines gtt  Neurology: sedated > on sedation vacation woke up and followed commands  Eyes: bilateral pupils equal and reactive   ENT/Neck: +ETT midline, Neck supple, trachea midline, No JVD   Respiratory: Clear bilaterally   CV: S1S2, no murmurs        [x] Sinus rhythm  Abdominal: Soft, NT, ND +BS   Extremities: 1-2+ pedal edema noted, + peripheral pulses   Skin: No Rashes, Hematoma, Ecchymosis                            Assessment:  63 y/o male with PMH of CABG, DM, HTN, HLD presenting as transfer from [place] for c/f STEMI. PTA arrival received 325 aspirin, 600 plavix, and no heparin drip started. Around 1:30 am patient had multiple episodes of non-bloody diarrhea and emesis with associated abdominal pain and chest pain, unable to localize, non-radiating, with associated SOB and no associated palpitations or diaphoresis. No recent fevers/chills, cough, rashes, or changes in urination. Does report mild diffuse back pain; started 5 days ago in setting on injury while walking and lifting objects. Denies focal weakness, numbness/tingling, or LOC due does report mild dizziness.    Cardiac arrest s/p [facility] ECMO 11/25  Hemodynamic instability  Hypovolemia  Post op respiratory insufficiency  Thrombocytopenia  Acute blood loss anemia  Leukocytosis         Plan:   ***Neuro***  CT head showed 4mm subdural hematoma 11/26: repeat CT head unchanged   [x] Sedated with [x] Precedex   Post operative neuro assessment       ***Cardiovascular***  11/28 TTE: EF 25%, Severe LV dysfunction  Invasive hemodynamic monitoring, assess perfusion indices   SR / CVP 7/ MAP 82/ PAP 37/10 (24)/ CI 1.6/ Hct 24.1/ Lactate 0.8  [x] Cardene - 2mg/hr   [x] ECMO- 3300 rpms, 3.2 flow  Reassessment of hemodynamics post resuscitation   [x] AC Therapy with Argatroban gtt  Went into Bradycardic and Hypoxic arrest 11/25  Serial EKG and cardiac enzymes       ***Pulmonary***  [x]  Nitric oxide- 20 ppm   Post op vent management   Titration of FiO2 and PEEP, follow SpO2, CXR, blood gasses   100% FiO2 on ECMO  Titrate sweep for pco2 and ph      Mode: AC/ CMV (Assist Control/ Continuous Mandatory Ventilation)  RR (machine): 14  FiO2: 50  PEEP: 10  ITime: 2  MAP: 12  PC: 10  PIP: 20              ***GI***  [x] Tolerating TF Vital AF, @ goal 10cc/hr.   [x] Protonix    Bowel regimen with Miralax and senna       ***Renal***  Continue to monitor I/Os, BUN/Creatinine.   Replete lytes PRN  De Dios present  Given Lasix 20 x1   C/w Free water 250 q8     ***ID***  Meropenem for empiric coverage  Follow up cultures    ***Endocrine***  [x]  DM2: HbA1c 7.3%                - [x] Insulin gtt              - Need tight glycemic control to prevent wound infection.        Patient requires continuous monitoring with bedside rhythm monitoring, pulse oximetry monitoring, and continuous central venous and arterial pressure monitoring; and intermittent blood gas analysis. Care plan discussed with the ICU care team.   Patient remained critical, at risk for life threatening decompensation.    I have spent 30 minutes providing critical care management to this patient.    By signing my name below, I, Abhijit Ngo, attest that this documentation has been prepared under the direction and in the presence of ___  Electronically signed: Brian Slaughter, 11-29-22 @ 20:42    I, ___ , personally performed the services described in this documentation. all medical record entries made by the karlaibe were at my direction and in my presence. I have reviewed the chart and agree that the record reflects my personal performance and is accurate and complete  Electronically signed: ___ Patient seen and examined at the bedside.    Remained critically ill on continuous ICU monitoring.    OBJECTIVE:  Vital Signs Last 24 Hrs  T(C): 37.1 (29 Nov 2022 20:00), Max: 37.1 (29 Nov 2022 16:00)  T(F): 98.8 (29 Nov 2022 20:00), Max: 98.8 (29 Nov 2022 16:00)  HR: 69 (29 Nov 2022 20:30) (64 - 79)  RR: 14 (29 Nov 2022 20:30) (14 - 32)  SpO2: 100% (29 Nov 2022 20:30) (97% - 100%)    Parameters below as of 29 Nov 2022 20:00  Patient On (Oxygen Delivery Method): ventilator    O2 Concentration (%): 50      Physical Exam:   General: Intubated, multiple lines gtt  Neurology: sedated > on sedation vacation woke up and followed commands  Eyes: bilateral pupils equal and reactive   ENT/Neck: +ETT midline, Neck supple, trachea midline, No JVD   Respiratory: Clear bilaterally   CV: S1S2, no murmurs        [x] Sinus rhythm  Abdominal: Soft, NT, ND +BS   Extremities: 1-2+ pedal edema noted, + peripheral pulses   Skin: No Rashes, Hematoma, Ecchymosis                            Assessment:  61 y/o male with PMH of CABG, DM, HTN, HLD presenting as transfer from [place] for c/f STEMI. PTA arrival received 325 aspirin, 600 plavix, and no heparin drip started. Around 1:30 am patient had multiple episodes of non-bloody diarrhea and emesis with associated abdominal pain and chest pain, unable to localize, non-radiating, with associated SOB and no associated palpitations or diaphoresis. No recent fevers/chills, cough, rashes, or changes in urination. Does report mild diffuse back pain; started 5 days ago in setting on injury while walking and lifting objects. Denies focal weakness, numbness/tingling, or LOC due does report mild dizziness.    Cardiac arrest s/p [facility] ECMO 11/25  Hemodynamic instability  Hypovolemia  Post op respiratory insufficiency  Thrombocytopenia  Acute blood loss anemia  Leukocytosis         Plan:   ***Neuro***  CT head showed 4mm subdural hematoma 11/26: repeat CT head unchanged   [x] Sedated with [x] Precedex   Post operative neuro assessment       ***Cardiovascular***  11/28 TTE: EF 25%, Severe LV dysfunction  Invasive hemodynamic monitoring, assess perfusion indices   SR / CVP 7/ MAP 82/ PAP 37/10 (24)/ CI 1.6/ Hct 24.1/ Lactate 0.8  [x] Cardene - 2mg/hr   [x] ECMO- 3300 rpm, 3.2 flow, Sweep 4   Reassessment of hemodynamics post resuscitation   [x] AC Therapy with Argatroban gtt        ***Pulmonary***  [x]  Nitric oxide- 20 ppm   Post op vent management   Titration of FiO2 and PEEP, follow SpO2, CXR, blood gasses   100% FiO2 on ECMO  Titrate sweep for pco2 and ph      Mode: AC/ CMV (Assist Control/ Continuous Mandatory Ventilation)  RR (machine): 14  FiO2: 50  PEEP: 10  ITime: 2  MAP: 12  PC: 10  PIP: 20              ***GI***  [x] Tolerating TF Vital AF, @ 20cc/hr.   [x] Protonix    Bowel regimen with Miralax and senna       ***Renal***  Continue to monitor I/Os, BUN/Creatinine.   Replete lytes PRN  De Dios present  Given Lasix 20 x1   C/w Free water 300 q6     ***ID***  Meropenem for empiric coverage  Follow up cultures    ***Endocrine***  [x]  DM2: HbA1c 7.3%                - [x] Insulin gtt              - Need tight glycemic control to prevent wound infection.        Patient requires continuous monitoring with bedside rhythm monitoring, pulse oximetry monitoring, and continuous central venous and arterial pressure monitoring; and intermittent blood gas analysis. Care plan discussed with the ICU care team.   Patient remained critical, at risk for life threatening decompensation.    I have spent 30 minutes providing critical care management to this patient.    By signing my name below, I, Abhijit Ngo, attest that this documentation has been prepared under the direction and in the presence of Yobani Alaniz NP  Electronically signed: Brian Slaughter, 11-29-22 @ 20:42    I, Yobani Alaniz Np , personally performed the services described in this documentation. all medical record entries made by the karlaibkyle were at my direction and in my presence. I have reviewed the chart and agree that the record reflects my personal performance and is accurate and complete  Electronically signed: ___ Patient seen and examined at the bedside.    Remained critically ill on continuous ICU monitoring.    OBJECTIVE:  Vital Signs Last 24 Hrs  T(C): 37.1 (29 Nov 2022 20:00), Max: 37.1 (29 Nov 2022 16:00)  T(F): 98.8 (29 Nov 2022 20:00), Max: 98.8 (29 Nov 2022 16:00)  HR: 69 (29 Nov 2022 20:30) (64 - 79)  RR: 14 (29 Nov 2022 20:30) (14 - 32)  SpO2: 100% (29 Nov 2022 20:30) (97% - 100%)    Parameters below as of 29 Nov 2022 20:00  Patient On (Oxygen Delivery Method): ventilator    O2 Concentration (%): 50      Physical Exam:   General: Intubated, multiple lines gtt  Neurology: sedated > on sedation vacation woke up and followed commands  Eyes: bilateral pupils equal and reactive   ENT/Neck: +ETT midline, Neck supple, trachea midline, No JVD   Respiratory: Clear bilaterally   CV: S1S2, no murmurs        [x] Sinus rhythm  Abdominal: Soft, NT, ND +BS   Extremities: 1-2+ pedal edema noted, + peripheral pulses   Skin: No Rashes, Hematoma, Ecchymosis                            Assessment:  63 y/o male with PMH of CABG, DM, HTN, HLD presenting as transfer from [place] for c/f STEMI. PTA arrival received 325 aspirin, 600 plavix, and no heparin drip started. Around 1:30 am patient had multiple episodes of non-bloody diarrhea and emesis with associated abdominal pain and chest pain, unable to localize, non-radiating, with associated SOB and no associated palpitations or diaphoresis. No recent fevers/chills, cough, rashes, or changes in urination. Does report mild diffuse back pain; started 5 days ago in setting on injury while walking and lifting objects. Denies focal weakness, numbness/tingling, or LOC due does report mild dizziness.    Cardiac arrest s/p VA ECMO 11/25  Hemodynamic instability  Hypovolemia  Post op respiratory insufficiency  Thrombocytopenia  Acute blood loss anemia  Leukocytosis         Plan:   ***Neuro***  CT head showed 4mm subdural hematoma 11/26: repeat CT head unchanged   [x] Sedated with [x] Precedex   Post operative neuro assessment       ***Cardiovascular***  11/28 TTE: EF 25%, Severe LV dysfunction  Invasive hemodynamic monitoring, assess perfusion indices   SR / CVP 7/ MAP 82/ PAP 37/10 (24)/ CI 1.6/ Hct 24.1/ Lactate 0.8  [x] Cardene - 2mg/hr   [x] ECMO- 3300 rpm, 3.2 flow, Sweep 4   Reassessment of hemodynamics post resuscitation   [x] AC Therapy with Argatroban gtt        ***Pulmonary***  [x]  Nitric oxide- 20 ppm   Post op vent management   Titration of FiO2 and PEEP, follow SpO2, CXR, blood gasses   100% FiO2 on ECMO  Titrate sweep for pco2 and ph      Mode: AC/ CMV (Assist Control/ Continuous Mandatory Ventilation)  RR (machine): 14  FiO2: 50  PEEP: 10  ITime: 2  MAP: 12  PC: 10  PIP: 20              ***GI***  [x] Tolerating TF Vital AF, @ 20cc/hr.   [x] Protonix    Bowel regimen with Miralax and senna       ***Renal***  Continue to monitor I/Os, BUN/Creatinine.   Replete lytes PRN  De Dios present  Given Lasix 20 x1   C/w Free water 300 q6     ***ID***  Meropenem for empiric coverage  Follow up cultures    ***Endocrine***  [x]  DM2: HbA1c 7.3%                - [x] Insulin gtt              - Need tight glycemic control to prevent wound infection.        Patient requires continuous monitoring with bedside rhythm monitoring, pulse oximetry monitoring, and continuous central venous and arterial pressure monitoring; and intermittent blood gas analysis. Care plan discussed with the ICU care team.   Patient remained critical, at risk for life threatening decompensation.    I have spent 30 minutes providing critical care management to this patient.    By signing my name below, I, Abhijit Ngo, attest that this documentation has been prepared under the direction and in the presence of Yobani Alaniz NP  Electronically signed: Brian Slaughter, 11-29-22 @ 20:42    I, Yobani Alaniz Np , personally performed the services described in this documentation. all medical record entries made by the karlaibkyle were at my direction and in my presence. I have reviewed the chart and agree that the record reflects my personal performance and is accurate and complete  Electronically signed: ___ Patient seen and examined at the bedside.    Remained critically ill on continuous ICU monitoring.    OBJECTIVE:  Vital Signs Last 24 Hrs  T(C): 37.1 (29 Nov 2022 20:00), Max: 37.1 (29 Nov 2022 16:00)  T(F): 98.8 (29 Nov 2022 20:00), Max: 98.8 (29 Nov 2022 16:00)  HR: 69 (29 Nov 2022 20:30) (64 - 79)  RR: 14 (29 Nov 2022 20:30) (14 - 32)  SpO2: 100% (29 Nov 2022 20:30) (97% - 100%)    Parameters below as of 29 Nov 2022 20:00  Patient On (Oxygen Delivery Method): ventilator    O2 Concentration (%): 50      Physical Exam:   General: Intubated, multiple lines gtt  Neurology: sedated > on sedation vacation woke up and followed commands  Eyes: bilateral pupils equal and reactive   ENT/Neck: +ETT midline, Neck supple, trachea midline, No JVD   Respiratory: Clear bilaterally   CV: S1S2, no murmurs        [x] Sinus rhythm  Abdominal: Soft, NT, ND +BS   Extremities: 1-2+ pedal edema noted, + peripheral pulses   Skin: No Rashes, Hematoma, Ecchymosis                            Assessment:  61 y/o male with PMH of CABG, DM, HTN, HLD presenting as transfer from [place] for c/f STEMI. PTA arrival received 325 aspirin, 600 plavix, and no heparin drip started. Around 1:30 am patient had multiple episodes of non-bloody diarrhea and emesis with associated abdominal pain and chest pain, unable to localize, non-radiating, with associated SOB and no associated palpitations or diaphoresis. No recent fevers/chills, cough, rashes, or changes in urination. Does report mild diffuse back pain; started 5 days ago in setting on injury while walking and lifting objects. Denies focal weakness, numbness/tingling, or LOC due does report mild dizziness.    Cardiac arrest s/p VA ECMO 11/25  Hemodynamic instability  Hypovolemia  Post op respiratory insufficiency  Thrombocytopenia  Acute blood loss anemia  Leukocytosis         Plan:   ***Neuro***  CT head showed 4mm subdural hematoma 11/26: repeat CT head unchanged   [x] Sedated with [x] Precedex   Post operative neuro assessment       ***Cardiovascular***  11/28 TTE: EF 25%, Severe LV dysfunction  Invasive hemodynamic monitoring, assess perfusion indices   SR / CVP 7/ MAP 82/ PAP 37/10 (24)/ CI 1.6/ Hct 24.1/ Lactate 0.8  [x] Cardene - 2mg/hr   [x] ECMO- 3300 rpm, 3.2 flow, Sweep 4   Reassessment of hemodynamics post resuscitation   [x] AC Therapy with Argatroban gtt        ***Pulmonary***  [x]  Nitric oxide- 20 ppm   Post op vent management   Titration of FiO2 and PEEP, follow SpO2, CXR, blood gasses   100% FiO2 on ECMO  Titrate sweep for pco2 and ph      Mode: AC/ CMV (Assist Control/ Continuous Mandatory Ventilation)  RR (machine): 14  FiO2: 50  PEEP: 10  ITime: 2  MAP: 12  PC: 10  PIP: 20              ***GI***  [x] Tolerating TF Vital AF, @ 20cc/hr.   [x] Protonix    Bowel regimen with Miralax and senna       ***Renal***  Continue to monitor I/Os, BUN/Creatinine.   Replete lytes PRN  De Dios present  Given Lasix 20 x1   C/w Free water 300 q6     ***ID***  Meropenem for empiric coverage  Follow up cultures    ***Endocrine***  [x]  DM2: HbA1c 7.3%                - [x] Insulin gtt              - Need tight glycemic control to prevent wound infection.        Patient requires continuous monitoring with bedside rhythm monitoring, pulse oximetry monitoring, and continuous central venous and arterial pressure monitoring; and intermittent blood gas analysis. Care plan discussed with the ICU care team.   Patient remained critical, at risk for life threatening decompensation.    I have spent 30 minutes providing critical care management to this patient.    By signing my name below, I, Abhijit Ngo, attest that this documentation has been prepared under the direction and in the presence of Yobani Alaniz NP  Electronically signed: Brian Slaughter, 11-29-22 @ 20:42    I, Yobani Alaniz Np , personally performed the services described in this documentation. all medical record entries made by the karlaibkyle were at my direction and in my presence. I have reviewed the chart and agree that the record reflects my personal performance and is accurate and complete  Electronically signed: ___

## 2022-11-29 NOTE — CONSULT NOTE ADULT - PROBLEM SELECTOR RECOMMENDATION 2
Will continue to f/u for support.   SW was present during the family meeting and emotional support was provided.   Will have a f/u FM on 12/6 at 1:30 pm.         Mynor Mitchell MD  Associate Chief Geriatrics and Palliative Care ([facility]) [facility]   [place] [facility]   , [employer] at [facility]      Please contact me via Teams from Monday through Friday between 9am-5pm. If not answering, please call the palliative care pager [phone]    After 5pm and on weekends, please see the contact information below:    In the event of newly developing, evolving, or worsening symptoms, please contact the Palliative Medicine team via pager (if the patient is at [facility] #[phone] or if the patient is at [facility] #[phone]) The Geriatric and Palliative Medicine service has coverage 24 hours a day/ 7 days a week to provide medical recommendations regarding symptom management needs via telephone Will continue to f/u for support.   SW was present during the family meeting and emotional support was provided.   Will have a f/u FM on 12/6 at 1:30 pm.         Mynor Mitchell MD  Associate Chief Geriatrics and Palliative Care (GaP) [facility]   [place] [facility]   , [employer] at [facility]      Please contact me via Teams from Monday through Friday between 9am-5pm. If not answering, please call the palliative care pager [phone]    After 5pm and on weekends, please see the contact information below:    In the event of newly developing, evolving, or worsening symptoms, please contact the Palliative Medicine team via pager (if the patient is at [facility] #[phone] or if the patient is at [facility] #[phone]) The Geriatric and Palliative Medicine service has coverage 24 hours a day/ 7 days a week to provide medical recommendations regarding symptom management needs via telephone Will continue to f/u for support.   SW was present during the family meeting and emotional support was provided.   Will have a f/u FM on 12/6 at 1:30 pm.         Mynor Mitchell MD  Associate Chief Geriatrics and Palliative Care (GaP) [facility]   [place] [facility]   , [employer] at [facility]      Please contact me via Teams from Monday through Friday between 9am-5pm. If not answering, please call the palliative care pager [phone]    After 5pm and on weekends, please see the contact information below:    In the event of newly developing, evolving, or worsening symptoms, please contact the Palliative Medicine team via pager (if the patient is at [facility] #[phone] or if the patient is at [facility] #[phone]) The Geriatric and Palliative Medicine service has coverage 24 hours a day/ 7 days a week to provide medical recommendations regarding symptom management needs via telephone

## 2022-11-29 NOTE — PROGRESS NOTE ADULT - ATTENDING COMMENTS
Multifactorial ARF in setting of severe HFrEF--> [facility] ECMO.  NO new events   1.  ARF--non oliguric, no HD required at present although need to follow closely to assess requirements for clearance and volume optimization.  Crplateau and now declining may reflect optimizing venous congestion.  Keep O>I which may require reinstitution of diuretics. Trend lactate for evidence of impaired perfusion reflecting suboptimal volume.   W/U as outlined.  Urinary decompression and trend UO carefully.      discussed at length with CTU team  Bolivar Morrow MD  contact me on TEAMS

## 2022-11-29 NOTE — PROGRESS NOTE ADULT - SUBJECTIVE AND OBJECTIVE BOX
Subjective:    off milrinone  vent is 50% FiO2, last right radial arterial paO2 80, concern for mixing  ECMO reduced this morning to 3500 rpm  4LPM--> 3.7LPM  heparin switched to argastroban  on precedex for sedation  last venous sat 80%    Medications:  argatroban Infusion 0.4 MICROgram(s)/kG/Min IV Continuous <Continuous>  artificial  tears Solution 1 Drop(s) Both EYES four times a day  atorvastatin 40 milliGRAM(s) Oral at bedtime  chlorhexidine 0.12% Liquid 15 milliLiter(s) Oral Mucosa every 12 hours  chlorhexidine 2% Cloths 1 Application(s) Topical <User Schedule>  chlorhexidine 4% Liquid 1 Application(s) Topical <User Schedule>  dexMEDEtomidine Infusion 1.5 MICROgram(s)/kG/Hr IV Continuous <Continuous>  dextrose 50% Injectable 50 milliLiter(s) IV Push every 15 minutes  hydrocortisone sodium succinate Injectable 25 milliGRAM(s) IV Push every 8 hours  insulin lispro (ADMELOG) corrective regimen sliding scale   SubCutaneous every 6 hours  insulin regular Infusion 6 Unit(s)/Hr IV Continuous <Continuous>  meropenem  IVPB 500 milliGRAM(s) IV Intermittent every 12 hours  niCARdipine Infusion 2 mG/Hr IV Continuous <Continuous>  pantoprazole  Injectable 40 milliGRAM(s) IV Push two times a day  polyethylene glycol 3350 17 Gram(s) Oral daily  potassium chloride  10 mEq/50 mL IVPB 10 milliEquivalent(s) IV Intermittent every 1 hour  senna 2 Tablet(s) Oral at bedtime  sodium chloride 0.9% lock flush 10 milliLiter(s) IV Push every 1 hour PRN  sodium chloride 0.9%. 1000 milliLiter(s) IV Continuous <Continuous>      Physical Exam:    Vitals:  Vital Signs Last 24 Hours  T(C): 37.1 (22 @ 16:00), Max: 37.1 (22 @ 16:00)  HR: 69 (22 @ 16:00) (66 - 79)  BP: --  RR: 14 (22 @ 16:00) (14 - 32)  SpO2: 100% (22 @ 16:00) (97% - 100%)    Weight in k.3 ( @ 00:00)    I&O's Summary    2022 07:01  -  2022 07:00  --------------------------------------------------------  IN: 3382.3 mL / OUT: 3340 mL / NET: 42.3 mL    2022 07:01  -  2022 16:10  --------------------------------------------------------  IN: 1180.8 mL / OUT: 1775 mL / NET: -594.2 mL        Tele:    RASS -3, sedated and intubated  Neck supple, CVP 4  Heart RRR s1s2 no m   Lungs coarse BS b/l, vent PEEP 10, FiO2 50%  Abd soft ND  Ext warm, no edema    Labs:                        12.8   10.57 )-----------( 48       ( 2022 15:08 )             39.1         153<H>  |  119<H>  |  68<H>  ----------------------------<  130<H>  3.5   |  21<L>  |  3.45<H>    Ca    8.4      2022 00:26  Phos  3.2       Mg     2.5         TPro  5.8<L>  /  Alb  3.3  /  TBili  0.8  /  DBili  0.2  /  AST  54<H>  /  ALT  11  /  AlkPhos  67  1129    PT/INR - ( 2022 00:24 )   PT: 11.6 sec;   INR: 1.01 ratio         PTT - ( 2022 13:11 )  PTT:31.8 sec  CARDIAC MARKERS ( 2022 00:26 )  x     / x     / 162 U/L / x     / 4.3 ng/mL  CARDIAC MARKERS ( 2022 00:58 )  x     / x     / 426 U/L / x     / 9.9 ng/mL      Serum Pro-Brain Natriuretic Peptide: 330 pg/mL ( @ 07:57)  Creatine Kinase, Serum: 162 U/L (22 @ 00:26)  Creatine Kinase, Serum: 426 U/L (22 @ 00:58)  Creatine Kinase, Serum: 1659 U/L (22 @ 00:33)  Creatine Kinase, Serum: 162 U/L (22 @ 00:26)  Creatine Kinase, Serum: 426 U/L (22 @ 00:58)  Creatine Kinase, Serum: 1659 U/L (22 @ 00:33)    Oxygen Saturation, Mixed: 75.9 ( @ 06:03)  Oxygen Saturation, Mixed: 79.4 ( @ 02:50)  Oxygen Saturation, Mixed: 78.6 ( @ 00:00)  Oxygen Saturation, Mixed: 74.1 ( @ 19:56)  Oxygen Saturation, Mixed: 75.8 ( @ 15:06)    Lactate Dehydrogenase, Serum: 650 U/L ( @ 00:26)  Lactate Dehydrogenase, Serum: 974 U/L ( @ 00:58)  Lactate Dehydrogenase, Serum: 1305 U/L ( @ 00:33)         Conclusions:  1. Tethered mitral valve leaflets with normal opening.  Mitral annular calcification. Mild mitral regurgitation.  2. Moderately dilated left atrium.  LA volume index = 42  cc/m2.  3. Moderate segmental left ventricular systolic  dysfunction. Endocardial visualization enhanced with  intravenous injection of Ultrasonic Enhancing Agent  (Definity). No left ventricular thrombus. LVEF calculated  using biplane Sequeira's method is 43%. Akinesis of the  mid-basal anterolateral wall, inferior and inferolateral  wall with all remaining walls hypokinetic.  4. Moderate diastolic dysfunction (Stage II).  5. Normal right atrium.  6. Normal right ventricular size and function.  7. Normal tricuspid valve. Minimal tricuspid regurgitation.  8. No pericardial effusion seen.

## 2022-11-29 NOTE — CONSULT NOTE ADULT - SUBJECTIVE AND OBJECTIVE BOX
HPI:  Patient is a 61 y/o male with PMH of CABG, DM, HTN, HLD presenting as transfer from [place] for c/f STEMI. PTA arrival received 325 aspirin, 600 plavix, and no heparin drip started. Around 1:30 am patient had multiple episodes of non-bloody diarrhea and emesis with associated abdominal pain and chest pain, unable to localize, non-radiating, with associated SOB and no associated palpitations or diaphoresis. No recent fevers/chills, cough, rashes, or changes in urination. Does report mild diffuse back pain; started 5 days ago in setting on injury while walking and lifting objects. Denies focal weakness, numbness/tingling, or LOC due does report mild dizziness.    Patient seen and examined in ED. Hemodynamically stable, alert and awake, A&Ox3. Daughter at bedside. Reports abdominal pain, and nausea. Abdomen is soft, tender in epigastric area and RUQ. Denies chest pain, SOB. WBC 20, T.bili 3.4, Lipase 300. RUQ US demonstrated gallbladder stones, pericholecystic fluid.  (24 Nov 2022 15:10)    PERTINENT PM/SXH:       FAMILY HISTORY:    Family Hx substance abuse [ ]yes [ ]no  ITEMS NOT CHECKED ARE NOT PRESENT    SOCIAL HISTORY:   Significant other/partner[ ]  Children[ ]  Advent/Spirituality:  Substance hx:  [ ]   Tobacco hx:  [ ]   Alcohol hx: [ ]   Home Opioid hx:  [ ] I-Stop Reference No:  Living Situation: [ ]Home  [ ]Long term care  [ ]Rehab [ ]Other    ADVANCE DIRECTIVES:    DNR/MOLST  [ ]  Living Will  [ ]   DECISION MAKER(s):  [ ] Health Care Proxy(s)  [ ] Surrogate(s)  [ ] Guardian           Name(s): Phone Number(s):    BASELINE (I)ADL(s) (prior to admission):  [place]: [ ]Total  [ ] Moderate [ ]Dependent    Allergies    No Known Allergies    Intolerances    MEDICATIONS  (STANDING):  argatroban Infusion 0.3 MICROgram(s)/kG/Min (1.67 mL/Hr) IV Continuous <Continuous>  artificial  tears Solution 1 Drop(s) Both EYES four times a day  atorvastatin 40 milliGRAM(s) Oral at bedtime  chlorhexidine 0.12% Liquid 15 milliLiter(s) Oral Mucosa every 12 hours  chlorhexidine 2% Cloths 1 Application(s) Topical <User Schedule>  dexMEDEtomidine Infusion 1.5 MICROgram(s)/kG/Hr (34.8 mL/Hr) IV Continuous <Continuous>  dextrose 50% Injectable 50 milliLiter(s) IV Push every 15 minutes  hydrocortisone sodium succinate Injectable 25 milliGRAM(s) IV Push every 8 hours  insulin regular Infusion 6 Unit(s)/Hr (6 mL/Hr) IV Continuous <Continuous>  meropenem  IVPB 500 milliGRAM(s) IV Intermittent every 12 hours  niCARdipine Infusion 2 mG/Hr (10 mL/Hr) IV Continuous <Continuous>  pantoprazole  Injectable 40 milliGRAM(s) IV Push two times a day  polyethylene glycol 3350 17 Gram(s) Oral daily  senna 2 Tablet(s) Oral at bedtime  sodium chloride 0.9%. 1000 milliLiter(s) (5 mL/Hr) IV Continuous <Continuous>    MEDICATIONS  (PRN):  sodium chloride 0.9% lock flush 10 milliLiter(s) IV Push every 1 hour PRN Pre/post blood products, medications, blood draw, and to maintain line patency    PRESENT SYMPTOMS: [ ]Unable to self-report  [ ] CPOT [ ] PAINADs [ ] RDOS  Source if other than patient:  [ ]Family   [ ]Team     Pain: [ ]yes [ ]no  QOL impact -   Location -                    Aggravating factors -  Quality -  Radiation -  Timing-  Severity (0-10 scale):  Minimal acceptable level (0-10 scale):     CPOT:    https://www.[facility].org/getattachment/[email or web address]/Critical-Care-Pain-Observation-Tool-(CPOT)    PAINAD Score: See PAINAD tool and score below     Dyspnea:                           [ ]Mild [ ]Moderate [ ]Severe    RDOS: See RDOS tool and score below   0 to 2  minimal or no respiratory distress   3  mild distress  4 to 6 moderate distress  >7 severe distress      Anxiety:                             [ ]Mild [ ]Moderate [ ]Severe  Fatigue:                             [ ]Mild [ ]Moderate [ ]Severe  Nausea:                             [ ]Mild [ ]Moderate [ ]Severe  Loss of appetite:              [ ]Mild [ ]Moderate [ ]Severe  Constipation:                    [ ]Mild [ ]Moderate [ ]Severe    PCSSQ[Palliative Care Spiritual Screening Question]   Severity (0-10):  Score of 4 or > indicate consideration of Chaplaincy referral.  Chaplaincy Referral: [ ] yes [ ] refused [ ] following [ ] Deferred     Caregiver [place]? : [ ] yes [ ] no [ ] Deferred [ ] Declined             Social work referral [ ] Patient & Family Centered Care Referral [ ]     Anticipatory Grief present?:  [ ] yes [ ] no  [ ] Deferred                  Social work referral [ ] Chaplaincy Referral [ ]    		  Other Symptoms:  [ ]All other review of systems negative     Palliative Performance Status Version 2:   See PPSv2 tool and score below          PHYSICAL EXAM:  Vital Signs Last 24 Hrs  T(C): 37.1 (29 Nov 2022 20:00), Max: 37.1 (29 Nov 2022 16:00)  T(F): 98.8 (29 Nov 2022 20:00), Max: 98.8 (29 Nov 2022 16:00)  HR: 67 (29 Nov 2022 22:00) (64 - 79)  BP: --  BP(mean): --  RR: 18 (29 Nov 2022 22:00) (14 - 32)  SpO2: 100% (29 Nov 2022 22:00) (97% - 100%)    Parameters below as of 29 Nov 2022 20:00  Patient On (Oxygen Delivery Method): ventilator    O2 Concentration (%): 50 I&O's Summary    28 Nov 2022 07:01  -  29 Nov 2022 07:00  --------------------------------------------------------  IN: 3382.3 mL / OUT: 3340 mL / NET: 42.3 mL    29 Nov 2022 07:01  -  29 Nov 2022 22:22  --------------------------------------------------------  IN: 2513.9 mL / OUT: 2990 mL / NET: -476.1 mL      GENERAL: [ ]Cachexia    [ ]Alert  [ ]Oriented x   [ ]Lethargic  [ ]Unarousable  [ ]Verbal  [ ]Non-Verbal  Behavioral:   [ ] Anxiety  [ ] Delirium [ ] Agitation [ ] Other  HEENT:  [ ]Normal   [ ]Dry mouth   [ ]ET Tube/Trach  [ ]Oral lesions  PULMONARY:   [ ]Clear [ ]Tachypnea  [ ]Audible excessive secretions   [ ]Rhonchi        [ ]Right [ ]Left [ ]Bilateral  [ ]Crackles        [ ]Right [ ]Left [ ]Bilateral  [ ]Wheezing     [ ]Right [ ]Left [ ]Bilateral  [ ]Diminished breath sounds [ ]right [ ]left [ ]bilateral  CARDIOVASCULAR:    [ ]Regular [ ]Irregular [ ]Tachy  [ ]Armani [ ]Murmur [ ]Other  GASTROINTESTINAL:  [ ]Soft  [ ]Distended   [ ]+BS  [ ]Non tender [ ]Tender  [ ]Other [ ]PEG [ ]OGT/ NGT  Last BM:  GENITOURINARY:  [ ]Normal [ ] Incontinent   [ ]Oliguria/Anuria   [ ]De Dios  MUSCULOSKELETAL:   [ ]Normal   [ ]Weakness  [ ]Bed/Wheelchair bound [ ]Edema  NEUROLOGIC:   [ ]No focal deficits  [ ]Cognitive impairment  [ ]Dysphagia [ ]Dysarthria [ ]Paresis [ ]Other   SKIN:   [ ]Normal  [ ]Rash  [ ]Other  [ ]Pressure ulcer(s)       Present on admission [ ]y [ ]n    CRITICAL CARE:  [ ] Shock Present  [ ]Septic [ ]Cardiogenic [ ]Neurologic [ ]Hypovolemic  [ ]  Vasopressors [ ]  Inotropes   [ ]Respiratory failure present [ ]Mechanical ventilation [ ]Non-invasive ventilatory support [ ]High flow  Mode: AC/ CMV (Assist Control/ Continuous Mandatory Ventilation), RR (machine): 14, FiO2: 50, PEEP: 10, ITime: 1, MAP: 14, PC: 10, PIP: 20  [ ]Acute  [ ]Chronic [ ]Hypoxic  [ ]Hypercarbic [ ]Other  [ ]Other organ failure     LABS:                        7.8    13.44 )-----------( 93       ( 29 Nov 2022 18:28 )             24.1   11-29    152<H>  |  121<H>  |  71<H>  ----------------------------<  144<H>  3.8   |  20<L>  |  3.20<H>    Ca    8.1<L>      29 Nov 2022 18:28  Phos  3.2     11-29  Mg     2.5     11-29    TPro  5.8<L>  /  Alb  3.3  /  TBili  0.8  /  DBili  0.2  /  AST  54<H>  /  ALT  11  /  AlkPhos  67  11-29  PT/INR - ( 29 Nov 2022 00:24 )   PT: 11.6 sec;   INR: 1.01 ratio         PTT - ( 29 Nov 2022 18:23 )  PTT:47.6 sec      RADIOLOGY & ADDITIONAL STUDIES:    PROTEIN CALORIE MALNUTRITION PRESENT: [ ]mild [ ]moderate [ ]severe [ ]underweight [ ]morbid obesity  https://www.andeal.org/vault/2440/web/files/ONC/Table_Clinical%20Characteristics%20to%20Document%20Malnutrition-White%20JV%20et%20al%202012.pdf    Height (cm): 172.7 (11-24-22 @ 19:15)  Weight (kg): 92.9 (11-24-22 @ 07:48)  BMI (kg/m2): 31.1 (11-24-22 @ 19:15)    [ ]PPSV2 < or = to 30% [ ]significant weight loss  [ ]poor nutritional intake  [ ]anasarca[ ]Artificial Nutrition      Other REFERRALS:  [ ]Hospice  [ ]Child Life  [ ]Social Work  [ ]Case management [ ]Holistic Therapy     Goals of Care Document: HARRIS Uriostegui (11-29-22 @ 15:36)  Goals of Care Conversation:   Participants:  · Participants  Family; Staff  · Spouse  Keyanna  · Child(keyla)  son Paul and daughter Deng  · Provider  Dr. Mitchell of [facility], Dr. Holbrook of [facility]  ·   Jas Uriostegui, JOHNATHAN, and Ynes Casarez LMSW  · Other   Intern Brooke and Medical Student Manuel    Advance Directives:  · Does patient have Advance Directive  No  · Do you want to complete the HCP and name a Jimmie Care Agent  No  · Does Patient Have a Surrogate  Yes  · Surrogate's Name  spouse Keyanna who defers to daugther Deng  · Does the Patient have a Court Appointed Guardian ([address])  No  · Caregiver:  no    Conversation Discussion:  · Conversation  Diagnosis; Prognosis; Treatment Options; Chaplaincy Referral  · Conversation Details  GAP consulted to assist in GOC discussion in the setting of patient with advanced illness currently placed on [facility] ECMO. IDT meeting held with patient's family today. Team utilized Yi  through [employer] for patient's spouse, ID Concepcion, [id number].    Team first introduced selves and roles in patient care. Family verbalized understanding and was receptive to meeting today. Team next inquired into family understanding of patient's current medical status, and patient's daughter, who is a physician, demonstrates good understanding. Family states that patient with gallstones and pancreatitis and note this leading to patient's respiratory distress and subsequent cardiac arrest. Family states that patient additionally with SDH and ERIC, and is now s/p ecmo placement and mechanical ventilation.     Team confirmed this today, and provided additional overview for family. Team discussed patient's cardiogenic shock, HF, and ARDS, and discussed ecmo further at length today as well. Team discussed that ecmo is helping to support patient's heart and lungs, but discussed it as a short-term intervention with risks including clotting and bleeding, which can further impact patient's SDH, though it is stable at present. Family verbalized understanding, and inquired into patient's ability to improve. Team discussed that patient will remain on ecmo at present and that team will continue to monitor progress with these organs and patient's kidneys as well, assessing the opportunity for recovery in the coming days. Team discussed being hopeful for recovery, but note that, should patient be unable to meaningfully recover from this status, ecmo cannot remain permanently. Team discussed plan moving forward at present is continued monitoring as well as possible future cardiac cath. Family verbalized understanding, and all questions answered today.    Patient's family appropriately tearful during meeting today. Patient's daughter discussed the difficulty in navigating her roles as a physician and as a daughter experiencing a loved one's health crisis, and notes frustrations experienced throughout. Family additionally notes much shock regarding patient's status, statin that patient with hx of of bypass in 2019, but states that patient was otherwise healthy and functional. Team validated family in the difficulty in this situation today, and much emotional support, active listening, and room for ventilation of feelings provided. Team assured ongoing support and availability as well.     Team inquired into spiritual support, as family identifies as Orthodoxy, but family declines at this time, stating they have been saying prayers for patient with their community.    Team lastly inquired into decision-making for patient, and patient's spouse defers to her daughter Nanya at this time.    GAP will continue to follow this case, with plan for follow up meeting 12/6 at 1:30.    What Matters Most To Patient and Family:  · What matters most to patient and family  Time spent with family, don, patient care    Personal Advance Directives Treatment Guidelines:   Treatment Guidelines:  · Decision Maker  Surrogate    Location of Discussion:   Time Spent on Advance Care Planning:  I spent 60 (in minutes) on advance care planning services with the patient.  This time is separate and distinct from any other care management services provided on this date.    Location of Discussion:  · Location of discussion  Face to face      Electronic Signatures:  Jas Uriostegui ([facility])  (Signed 29-Nov-2022 16:23)  	Authored: Goals of Care Conversation, Personal Advance Directives Treatment Guidelines, Location of Discussion      Last Updated: 29-Nov-2022 16:23 by Jas Uriostegui ([facility])     HPI:  Patient is a 63 y/o male with PMH of CABG, DM, HTN, HLD presenting as transfer from [place] for c/f STEMI. PTA arrival received 325 aspirin, 600 plavix, and no heparin drip started. Around 1:30 am patient had multiple episodes of non-bloody diarrhea and emesis with associated abdominal pain and chest pain, unable to localize, non-radiating, with associated SOB and no associated palpitations or diaphoresis. No recent fevers/chills, cough, rashes, or changes in urination. Does report mild diffuse back pain; started 5 days ago in setting on injury while walking and lifting objects. Denies focal weakness, numbness/tingling, or LOC due does report mild dizziness.    Patient seen and examined in ED. Hemodynamically stable, alert and awake, A&Ox3. Daughter at bedside. Reports abdominal pain, and nausea. Abdomen is soft, tender in epigastric area and RUQ. Denies chest pain, SOB. WBC 20, T.bili 3.4, Lipase 300. RUQ US demonstrated gallbladder stones, pericholecystic fluid.  (24 Nov 2022 15:10)    PERTINENT PM/SXH:       FAMILY HISTORY:    Family Hx substance abuse [ ]yes [ ]no  ITEMS NOT CHECKED ARE NOT PRESENT    SOCIAL HISTORY:   Significant other/partner[ ]  Children[ ]  Roman Catholic/Spirituality:  Substance hx:  [ ]   Tobacco hx:  [ ]   Alcohol hx: [ ]   Home Opioid hx:  [ ] I-Stop Reference No:  Living Situation: [ ]Home  [ ]Long term care  [ ]Rehab [ ]Other    ADVANCE DIRECTIVES:    DNR/MOLST  [ ]  Living Will  [ ]   DECISION MAKER(s):  [ ] Health Care Proxy(s)  [ ] Surrogate(s)  [ ] Guardian           Name(s): Phone Number(s):    BASELINE (I)ADL(s) (prior to admission):  [place]: [ ]Total  [ ] Moderate [ ]Dependent    Allergies    No Known Allergies    Intolerances    MEDICATIONS  (STANDING):  argatroban Infusion 0.3 MICROgram(s)/kG/Min (1.67 mL/Hr) IV Continuous <Continuous>  artificial  tears Solution 1 Drop(s) Both EYES four times a day  atorvastatin 40 milliGRAM(s) Oral at bedtime  chlorhexidine 0.12% Liquid 15 milliLiter(s) Oral Mucosa every 12 hours  chlorhexidine 2% Cloths 1 Application(s) Topical <User Schedule>  dexMEDEtomidine Infusion 1.5 MICROgram(s)/kG/Hr (34.8 mL/Hr) IV Continuous <Continuous>  dextrose 50% Injectable 50 milliLiter(s) IV Push every 15 minutes  hydrocortisone sodium succinate Injectable 25 milliGRAM(s) IV Push every 8 hours  insulin regular Infusion 6 Unit(s)/Hr (6 mL/Hr) IV Continuous <Continuous>  meropenem  IVPB 500 milliGRAM(s) IV Intermittent every 12 hours  niCARdipine Infusion 2 mG/Hr (10 mL/Hr) IV Continuous <Continuous>  pantoprazole  Injectable 40 milliGRAM(s) IV Push two times a day  polyethylene glycol 3350 17 Gram(s) Oral daily  senna 2 Tablet(s) Oral at bedtime  sodium chloride 0.9%. 1000 milliLiter(s) (5 mL/Hr) IV Continuous <Continuous>    MEDICATIONS  (PRN):  sodium chloride 0.9% lock flush 10 milliLiter(s) IV Push every 1 hour PRN Pre/post blood products, medications, blood draw, and to maintain line patency    PRESENT SYMPTOMS: [ ]Unable to self-report  [ ] CPOT [ ] PAINADs [ ] RDOS  Source if other than patient:  [ ]Family   [ ]Team     Pain: [ ]yes [ ]no  QOL impact -   Location -                    Aggravating factors -  Quality -  Radiation -  Timing-  Severity (0-10 scale):  Minimal acceptable level (0-10 scale):     CPOT:    https://www.[facility].org/getattachment/[email or web address]/Critical-Care-Pain-Observation-Tool-(CPOT)    PAINAD Score: See PAINAD tool and score below     Dyspnea:                           [ ]Mild [ ]Moderate [ ]Severe    RDOS: See RDOS tool and score below   0 to 2  minimal or no respiratory distress   3  mild distress  4 to 6 moderate distress  >7 severe distress      Anxiety:                             [ ]Mild [ ]Moderate [ ]Severe  Fatigue:                             [ ]Mild [ ]Moderate [ ]Severe  Nausea:                             [ ]Mild [ ]Moderate [ ]Severe  Loss of appetite:              [ ]Mild [ ]Moderate [ ]Severe  Constipation:                    [ ]Mild [ ]Moderate [ ]Severe    PCSSQ[Palliative Care Spiritual Screening Question]   Severity (0-10):  Score of 4 or > indicate consideration of Chaplaincy referral.  Chaplaincy Referral: [ ] yes [ ] refused [ ] following [ ] Deferred     Caregiver [place]? : [ ] yes [ ] no [ ] Deferred [ ] Declined             Social work referral [ ] Patient & Family Centered Care Referral [ ]     Anticipatory Grief present?:  [ ] yes [ ] no  [ ] Deferred                  Social work referral [ ] Chaplaincy Referral [ ]    		  Other Symptoms:  [ ]All other review of systems negative     Palliative Performance Status Version 2:   See PPSv2 tool and score below          PHYSICAL EXAM:  Vital Signs Last 24 Hrs  T(C): 37.1 (29 Nov 2022 20:00), Max: 37.1 (29 Nov 2022 16:00)  T(F): 98.8 (29 Nov 2022 20:00), Max: 98.8 (29 Nov 2022 16:00)  HR: 67 (29 Nov 2022 22:00) (64 - 79)  BP: --  BP(mean): --  RR: 18 (29 Nov 2022 22:00) (14 - 32)  SpO2: 100% (29 Nov 2022 22:00) (97% - 100%)    Parameters below as of 29 Nov 2022 20:00  Patient On (Oxygen Delivery Method): ventilator    O2 Concentration (%): 50 I&O's Summary    28 Nov 2022 07:01  -  29 Nov 2022 07:00  --------------------------------------------------------  IN: 3382.3 mL / OUT: 3340 mL / NET: 42.3 mL    29 Nov 2022 07:01  -  29 Nov 2022 22:22  --------------------------------------------------------  IN: 2513.9 mL / OUT: 2990 mL / NET: -476.1 mL      GENERAL: [ ]Cachexia    [ ]Alert  [ ]Oriented x   [ ]Lethargic  [ ]Unarousable  [ ]Verbal  [ ]Non-Verbal  Behavioral:   [ ] Anxiety  [ ] Delirium [ ] Agitation [ ] Other  HEENT:  [ ]Normal   [ ]Dry mouth   [ ]ET Tube/Trach  [ ]Oral lesions  PULMONARY:   [ ]Clear [ ]Tachypnea  [ ]Audible excessive secretions   [ ]Rhonchi        [ ]Right [ ]Left [ ]Bilateral  [ ]Crackles        [ ]Right [ ]Left [ ]Bilateral  [ ]Wheezing     [ ]Right [ ]Left [ ]Bilateral  [ ]Diminished breath sounds [ ]right [ ]left [ ]bilateral  CARDIOVASCULAR:    [ ]Regular [ ]Irregular [ ]Tachy  [ ]Armani [ ]Murmur [ ]Other  GASTROINTESTINAL:  [ ]Soft  [ ]Distended   [ ]+BS  [ ]Non tender [ ]Tender  [ ]Other [ ]PEG [ ]OGT/ NGT  Last BM:  GENITOURINARY:  [ ]Normal [ ] Incontinent   [ ]Oliguria/Anuria   [ ]De Dios  MUSCULOSKELETAL:   [ ]Normal   [ ]Weakness  [ ]Bed/Wheelchair bound [ ]Edema  NEUROLOGIC:   [ ]No focal deficits  [ ]Cognitive impairment  [ ]Dysphagia [ ]Dysarthria [ ]Paresis [ ]Other   SKIN:   [ ]Normal  [ ]Rash  [ ]Other  [ ]Pressure ulcer(s)       Present on admission [ ]y [ ]n    CRITICAL CARE:  [ ] Shock Present  [ ]Septic [ ]Cardiogenic [ ]Neurologic [ ]Hypovolemic  [ ]  Vasopressors [ ]  Inotropes   [ ]Respiratory failure present [ ]Mechanical ventilation [ ]Non-invasive ventilatory support [ ]High flow  Mode: AC/ CMV (Assist Control/ Continuous Mandatory Ventilation), RR (machine): 14, FiO2: 50, PEEP: 10, ITime: 1, MAP: 14, PC: 10, PIP: 20  [ ]Acute  [ ]Chronic [ ]Hypoxic  [ ]Hypercarbic [ ]Other  [ ]Other organ failure     LABS:                        7.8    13.44 )-----------( 93       ( 29 Nov 2022 18:28 )             24.1   11-29    152<H>  |  121<H>  |  71<H>  ----------------------------<  144<H>  3.8   |  20<L>  |  3.20<H>    Ca    8.1<L>      29 Nov 2022 18:28  Phos  3.2     11-29  Mg     2.5     11-29    TPro  5.8<L>  /  Alb  3.3  /  TBili  0.8  /  DBili  0.2  /  AST  54<H>  /  ALT  11  /  AlkPhos  67  11-29  PT/INR - ( 29 Nov 2022 00:24 )   PT: 11.6 sec;   INR: 1.01 ratio         PTT - ( 29 Nov 2022 18:23 )  PTT:47.6 sec      RADIOLOGY & ADDITIONAL STUDIES:    PROTEIN CALORIE MALNUTRITION PRESENT: [ ]mild [ ]moderate [ ]severe [ ]underweight [ ]morbid obesity  https://www.andeal.org/vault/2440/web/files/ONC/Table_Clinical%20Characteristics%20to%20Document%20Malnutrition-White%20JV%20et%20al%202012.pdf    Height (cm): 172.7 (11-24-22 @ 19:15)  Weight (kg): 92.9 (11-24-22 @ 07:48)  BMI (kg/m2): 31.1 (11-24-22 @ 19:15)    [ ]PPSV2 < or = to 30% [ ]significant weight loss  [ ]poor nutritional intake  [ ]anasarca[ ]Artificial Nutrition      Other REFERRALS:  [ ]Hospice  [ ]Child Life  [ ]Social Work  [ ]Case management [ ]Holistic Therapy     Goals of Care Document: HARRIS Uriostegui (11-29-22 @ 15:36)  Goals of Care Conversation:   Participants:  · Participants  Family; Staff  · Spouse  Keyanna  · Child(keyla)  son Paul and daughter Deng  · Provider  Dr. Mitchell of [facility]", Dr. Holbrook of [facility]  ·   Jas Uriostegui, JOHNATHAN, and Ynes Casarez LMSW  · Other   Intern Brooke and Medical Student Manuel    Advance Directives:  · Does patient have Advance Directive  No  · Do you want to complete the HCP and name a Jimmie Care Agent  No  · Does Patient Have a Surrogate  Yes  · Surrogate's Name  spouse Keyanna who defers to daugther Deng  · Does the Patient have a Court Appointed Guardian ([address])  No  · Caregiver:  no    Conversation Discussion:  · Conversation  Diagnosis; Prognosis; Treatment Options; Chaplaincy Referral  · Conversation Details  GAP consulted to assist in GOC discussion in the setting of patient with advanced illness currently placed on [facility] ECMO. IDT meeting held with patient's family today. Team utilized Polish  through [employer] for patient's spouse, ID Concepcion, [id number].    Team first introduced selves and roles in patient care. Family verbalized understanding and was receptive to meeting today. Team next inquired into family understanding of patient's current medical status, and patient's daughter, who is a physician, demonstrates good understanding. Family states that patient with gallstones and pancreatitis and note this leading to patient's respiratory distress and subsequent cardiac arrest. Family states that patient additionally with SDH and ERIC, and is now s/p ecmo placement and mechanical ventilation.     Team confirmed this today, and provided additional overview for family. Team discussed patient's cardiogenic shock, HF, and ARDS, and discussed ecmo further at length today as well. Team discussed that ecmo is helping to support patient's heart and lungs, but discussed it as a short-term intervention with risks including clotting and bleeding, which can further impact patient's SDH, though it is stable at present. Family verbalized understanding, and inquired into patient's ability to improve. Team discussed that patient will remain on ecmo at present and that team will continue to monitor progress with these organs and patient's kidneys as well, assessing the opportunity for recovery in the coming days. Team discussed being hopeful for recovery, but note that, should patient be unable to meaningfully recover from this status, ecmo cannot remain permanently. Team discussed plan moving forward at present is continued monitoring as well as possible future cardiac cath. Family verbalized understanding, and all questions answered today.    Patient's family appropriately tearful during meeting today. Patient's daughter discussed the difficulty in navigating her roles as a physician and as a daughter experiencing a loved one's health crisis, and notes frustrations experienced throughout. Family additionally notes much shock regarding patient's status, statin that patient with hx of of bypass in 2019, but states that patient was otherwise healthy and functional. Team validated family in the difficulty in this situation today, and much emotional support, active listening, and room for ventilation of feelings provided. Team assured ongoing support and availability as well.     Team inquired into spiritual support, as family identifies as Shinto, but family declines at this time, stating they have been saying prayers for patient with their community.    Team lastly inquired into decision-making for patient, and patient's spouse defers to her daughter Nanya at this time.    GAP will continue to follow this case, with plan for follow up meeting 12/6 at 1:30.    What Matters Most To Patient and Family:  · What matters most to patient and family  Time spent with family, don, patient care    Personal Advance Directives Treatment Guidelines:   Treatment Guidelines:  · Decision Maker  Surrogate    Location of Discussion:   Time Spent on Advance Care Planning:  I spent 60 (in minutes) on advance care planning services with the patient.  This time is separate and distinct from any other care management services provided on this date.    Location of Discussion:  · Location of discussion  Face to face      Electronic Signatures:  Jas Uriostegui ([facility] [place])  (Signed 29-Nov-2022 16:23)  	Authored: Goals of Care Conversation, Personal Advance Directives Treatment Guidelines, Location of Discussion      Last Updated: 29-Nov-2022 16:23 by Jas Uriostegui ([facility] [place])     HPI:  Patient is a 61 y/o male with PMH of CABG, DM, HTN, HLD presenting as transfer from [place] for c/f STEMI. PTA arrival received 325 aspirin, 600 plavix, and no heparin drip started. Around 1:30 am patient had multiple episodes of non-bloody diarrhea and emesis with associated abdominal pain and chest pain, unable to localize, non-radiating, with associated SOB and no associated palpitations or diaphoresis. No recent fevers/chills, cough, rashes, or changes in urination. Does report mild diffuse back pain; started 5 days ago in setting on injury while walking and lifting objects. Denies focal weakness, numbness/tingling, or LOC due does report mild dizziness.    Patient seen and examined in ED. Hemodynamically stable, alert and awake, A&Ox3. Daughter at bedside. Reports abdominal pain, and nausea. Abdomen is soft, tender in epigastric area and RUQ. Denies chest pain, SOB. WBC 20, T.bili 3.4, Lipase 300. RUQ US demonstrated gallbladder stones, pericholecystic fluid.  (24 Nov 2022 15:10)    PERTINENT PM/SXH:       FAMILY HISTORY:    Family Hx substance abuse [ ]yes [ ]no  ITEMS NOT CHECKED ARE NOT PRESENT    SOCIAL HISTORY:   Significant other/partner[ ]  Children[ ]  Faith/Spirituality:  Substance hx:  [ ]   Tobacco hx:  [ ]   Alcohol hx: [ ]   Home Opioid hx:  [ ] I-Stop Reference No:  Living Situation: [ ]Home  [ ]Long term care  [ ]Rehab [ ]Other    ADVANCE DIRECTIVES:    DNR/MOLST  [ ]  Living Will  [ ]   DECISION MAKER(s):  [ ] Health Care Proxy(s)  [ ] Surrogate(s)  [ ] Guardian           Name(s): Phone Number(s):    BASELINE (I)ADL(s) (prior to admission):  Love: [ ]Total  [ ] Moderate [ ]Dependent    Allergies    No Known Allergies    Intolerances    MEDICATIONS  (STANDING):  argatroban Infusion 0.3 MICROgram(s)/kG/Min (1.67 mL/Hr) IV Continuous <Continuous>  artificial  tears Solution 1 Drop(s) Both EYES four times a day  atorvastatin 40 milliGRAM(s) Oral at bedtime  chlorhexidine 0.12% Liquid 15 milliLiter(s) Oral Mucosa every 12 hours  chlorhexidine 2% Cloths 1 Application(s) Topical <User Schedule>  dexMEDEtomidine Infusion 1.5 MICROgram(s)/kG/Hr (34.8 mL/Hr) IV Continuous <Continuous>  dextrose 50% Injectable 50 milliLiter(s) IV Push every 15 minutes  hydrocortisone sodium succinate Injectable 25 milliGRAM(s) IV Push every 8 hours  insulin regular Infusion 6 Unit(s)/Hr (6 mL/Hr) IV Continuous <Continuous>  meropenem  IVPB 500 milliGRAM(s) IV Intermittent every 12 hours  niCARdipine Infusion 2 mG/Hr (10 mL/Hr) IV Continuous <Continuous>  pantoprazole  Injectable 40 milliGRAM(s) IV Push two times a day  polyethylene glycol 3350 17 Gram(s) Oral daily  senna 2 Tablet(s) Oral at bedtime  sodium chloride 0.9%. 1000 milliLiter(s) (5 mL/Hr) IV Continuous <Continuous>    MEDICATIONS  (PRN):  sodium chloride 0.9% lock flush 10 milliLiter(s) IV Push every 1 hour PRN Pre/post blood products, medications, blood draw, and to maintain line patency    PRESENT SYMPTOMS: [ ]Unable to self-report  [ ] CPOT [ ] PAINADs [ ] RDOS  Source if other than patient:  [ ]Family   [ ]Team     Pain: [ ]yes [ ]no  QOL impact -   Location -                    Aggravating factors -  Quality -  Radiation -  Timing-  Severity (0-10 scale):  Minimal acceptable level (0-10 scale):     CPOT:    https://www.[facility].org/getattachment/[email or web address]/Critical-Care-Pain-Observation-Tool-(CPOT)    PAINAD Score: See PAINAD tool and score below     Dyspnea:                           [ ]Mild [ ]Moderate [ ]Severe    RDOS: See RDOS tool and score below   0 to 2  minimal or no respiratory distress   3  mild distress  4 to 6 moderate distress  >7 severe distress      Anxiety:                             [ ]Mild [ ]Moderate [ ]Severe  Fatigue:                             [ ]Mild [ ]Moderate [ ]Severe  Nausea:                             [ ]Mild [ ]Moderate [ ]Severe  Loss of appetite:              [ ]Mild [ ]Moderate [ ]Severe  Constipation:                    [ ]Mild [ ]Moderate [ ]Severe    PCSSQ[Palliative Care Spiritual Screening Question]   Severity (0-10):  Score of 4 or > indicate consideration of Chaplaincy referral.  Chaplaincy Referral: [ ] yes [ ] refused [ ] following [ ] Deferred     Caregiver [place]? : [ ] yes [ ] no [ ] Deferred [ ] Declined             Social work referral [ ] Patient & Family Centered Care Referral [ ]     Anticipatory Grief present?:  [ ] yes [ ] no  [ ] Deferred                  Social work referral [ ] Chaplaincy Referral [ ]    		  Other Symptoms:  [ ]All other review of systems negative     Palliative Performance Status Version 2:   See PPSv2 tool and score below          PHYSICAL EXAM:  Vital Signs Last 24 Hrs  T(C): 37.1 (29 Nov 2022 20:00), Max: 37.1 (29 Nov 2022 16:00)  T(F): 98.8 (29 Nov 2022 20:00), Max: 98.8 (29 Nov 2022 16:00)  HR: 67 (29 Nov 2022 22:00) (64 - 79)  BP: --  BP(mean): --  RR: 18 (29 Nov 2022 22:00) (14 - 32)  SpO2: 100% (29 Nov 2022 22:00) (97% - 100%)    Parameters below as of 29 Nov 2022 20:00  Patient On (Oxygen Delivery Method): ventilator    O2 Concentration (%): 50 I&O's Summary    28 Nov 2022 07:01  -  29 Nov 2022 07:00  --------------------------------------------------------  IN: 3382.3 mL / OUT: 3340 mL / NET: 42.3 mL    29 Nov 2022 07:01  -  29 Nov 2022 22:22  --------------------------------------------------------  IN: 2513.9 mL / OUT: 2990 mL / NET: -476.1 mL      GENERAL: [ ]Cachexia    [ ]Alert  [ ]Oriented x   [ ]Lethargic  [ ]Unarousable  [ ]Verbal  [ ]Non-Verbal  Behavioral:   [ ] Anxiety  [ ] Delirium [ ] Agitation [ ] Other  HEENT:  [ ]Normal   [ ]Dry mouth   [ ]ET Tube/Trach  [ ]Oral lesions  PULMONARY:   [ ]Clear [ ]Tachypnea  [ ]Audible excessive secretions   [ ]Rhonchi        [ ]Right [ ]Left [ ]Bilateral  [ ]Crackles        [ ]Right [ ]Left [ ]Bilateral  [ ]Wheezing     [ ]Right [ ]Left [ ]Bilateral  [ ]Diminished breath sounds [ ]right [ ]left [ ]bilateral  CARDIOVASCULAR:    [ ]Regular [ ]Irregular [ ]Tachy  [ ]Armani [ ]Murmur [ ]Other  GASTROINTESTINAL:  [ ]Soft  [ ]Distended   [ ]+BS  [ ]Non tender [ ]Tender  [ ]Other [ ]PEG [ ]OGT/ NGT  Last BM:  GENITOURINARY:  [ ]Normal [ ] Incontinent   [ ]Oliguria/Anuria   [ ]De Dios  MUSCULOSKELETAL:   [ ]Normal   [ ]Weakness  [ ]Bed/Wheelchair bound [ ]Edema  NEUROLOGIC:   [ ]No focal deficits  [ ]Cognitive impairment  [ ]Dysphagia [ ]Dysarthria [ ]Paresis [ ]Other   SKIN:   [ ]Normal  [ ]Rash  [ ]Other  [ ]Pressure ulcer(s)       Present on admission [ ]y [ ]n    CRITICAL CARE:  [ ] Shock Present  [ ]Septic [ ]Cardiogenic [ ]Neurologic [ ]Hypovolemic  [ ]  Vasopressors [ ]  Inotropes   [ ]Respiratory failure present [ ]Mechanical ventilation [ ]Non-invasive ventilatory support [ ]High flow  Mode: AC/ CMV (Assist Control/ Continuous Mandatory Ventilation), RR (machine): 14, FiO2: 50, PEEP: 10, ITime: 1, MAP: 14, PC: 10, PIP: 20  [ ]Acute  [ ]Chronic [ ]Hypoxic  [ ]Hypercarbic [ ]Other  [ ]Other organ failure     LABS:                        7.8    13.44 )-----------( 93       ( 29 Nov 2022 18:28 )             24.1   11-29    152<H>  |  121<H>  |  71<H>  ----------------------------<  144<H>  3.8   |  20<L>  |  3.20<H>    Ca    8.1<L>      29 Nov 2022 18:28  Phos  3.2     11-29  Mg     2.5     11-29    TPro  5.8<L>  /  Alb  3.3  /  TBili  0.8  /  DBili  0.2  /  AST  54<H>  /  ALT  11  /  AlkPhos  67  11-29  PT/INR - ( 29 Nov 2022 00:24 )   PT: 11.6 sec;   INR: 1.01 ratio         PTT - ( 29 Nov 2022 18:23 )  PTT:47.6 sec      RADIOLOGY & ADDITIONAL STUDIES:    PROTEIN CALORIE MALNUTRITION PRESENT: [ ]mild [ ]moderate [ ]severe [ ]underweight [ ]morbid obesity  https://www.andeal.org/vault/2440/web/files/ONC/Table_Clinical%20Characteristics%20to%20Document%20Malnutrition-White%20JV%20et%20al%202012.pdf    Height (cm): 172.7 (11-24-22 @ 19:15)  Weight (kg): 92.9 (11-24-22 @ 07:48)  BMI (kg/m2): 31.1 (11-24-22 @ 19:15)    [ ]PPSV2 < or = to 30% [ ]significant weight loss  [ ]poor nutritional intake  [ ]anasarca[ ]Artificial Nutrition      Other REFERRALS:  [ ]Hospice  [ ]Child Life  [ ]Social Work  [ ]Case management [ ]Holistic Therapy     Goals of Care Document: HARRIS Uriostegui (11-29-22 @ 15:36)  Goals of Care Conversation:   Participants:  · Participants  Family; Staff  · Spouse  Keyanna  · Child(keyla)  son Paul and daughter Deng  · Provider  Dr. Mitchell of [facility], Dr. Holbrook of [facility]  ·   Jas Uriostegui, JOHNATHAN, and Ynes Casarez LMSW  · Other   Intern Brooke and Medical Student Manuel    Advance Directives:  · Does patient have Advance Directive  No  · Do you want to complete the HCP and name a Jimmie Care Agent  No  · Does Patient Have a Surrogate  Yes  · Surrogate's Name  spouse Keyanna who defers to daugther Deng  · Does the Patient have a Court Appointed Guardian ([address])  No  · Caregiver:  no    Conversation Discussion:  · Conversation  Diagnosis; Prognosis; Treatment Options; Chaplaincy Referral  · Conversation Details  GAP consulted to assist in GOC discussion in the setting of patient with advanced illness currently placed on [facility]. IDT meeting held with patient's family today. Team utilized Latvian  through [employer] for patient's spouse, ID Concepcion, [id number].    Team first introduced selves and roles in patient care. Family verbalized understanding and was receptive to meeting today. Team next inquired into family understanding of patient's current medical status, and patient's daughter, who is a physician, demonstrates good understanding. Family states that patient with gallstones and pancreatitis and note this leading to patient's respiratory distress and subsequent cardiac arrest. Family states that patient additionally with SDH and ERIC, and is now s/p ecmo placement and mechanical ventilation.     Team confirmed this today, and provided additional overview for family. Team discussed patient's cardiogenic shock, HF, and ARDS, and discussed ecmo further at length today as well. Team discussed that ecmo is helping to support patient's heart and lungs, but discussed it as a short-term intervention with risks including clotting and bleeding, which can further impact patient's SDH, though it is stable at present. Family verbalized understanding, and inquired into patient's ability to improve. Team discussed that patient will remain on ecmo at present and that team will continue to monitor progress with these organs and patient's kidneys as well, assessing the opportunity for recovery in the coming days. Team discussed being hopeful for recovery, but note that, should patient be unable to meaningfully recover from this status, ecmo cannot remain permanently. Team discussed plan moving forward at present is continued monitoring as well as possible future cardiac cath. Family verbalized understanding, and all questions answered today.    Patient's family appropriately tearful during meeting today. Patient's daughter discussed the difficulty in navigating her roles as a physician and as a daughter experiencing a loved one's health crisis, and notes frustrations experienced throughout. Family additionally notes much shock regarding patient's status, statin that patient with hx of of bypass in 2019, but states that patient was otherwise healthy and functional. Team validated family in the difficulty in this situation today, and much emotional support, active listening, and room for ventilation of feelings provided. Team assured ongoing support and availability as well.     Team inquired into spiritual support, as family identifies as Mormonism, but family declines at this time, stating they have been saying prayers for patient with their community.    Team lastly inquired into decision-making for patient, and patient's spouse defers to her daughter Nanya at this time.    GAP will continue to follow this case, with plan for follow up meeting 12/6 at 1:30.    What Matters Most To Patient and Family:  · What matters most to patient and family  Time spent with family, don, patient care    Personal Advance Directives Treatment Guidelines:   Treatment Guidelines:  · Decision Maker  Surrogate    Location of Discussion:   Time Spent on Advance Care Planning:  I spent 60 (in minutes) on advance care planning services with the patient.  This time is separate and distinct from any other care management services provided on this date.    Location of Discussion:  · Location of discussion  Face to face      Electronic Signatures:  Jas Uriostegui ([facility] [place])  (Signed 29-Nov-2022 16:23)  	Authored: Goals of Care Conversation, Personal Advance Directives Treatment Guidelines, Location of Discussion      Last Updated: 29-Nov-2022 16:23 by Jas Uriostegui ([facility] [place])     HPI:  61 YO M with a history of CAD s/p 4v CABG ~2019 in [facility], DM2 (A1c 7.3%), and HTN who initially presented to OSH with abdominal pain and n/v and found to have pancreatitis and elevated troponins. CT abdomen revealed acute pancreatitis and his initial LVEF was 40-45%. He developed MSOF with hypoxic respiratory failure requiring intubation and ERIC and went into hypoxic PEA arrest requiring ACLS and due to persistent hypoxia and hypotension on pressors after ROSC was cannulated to [facility] on 11/25. Notably CTH that day revealed small subdural hemorrhage.     His post arrest TTE on 11/26 showed a signficantly worse LVEF of 5-10% with an AV that was only minimally opening. Geriatrics and Palliative Medicine ([facility]) Team was called for GOC and support through [facility] ECMO     PERTINENT PM/SXH:       FAMILY HISTORY:  No h/o cardiac arrest.   Family Hx substance abuse [ ]yes [x ]no  ITEMS NOT CHECKED ARE NOT PRESENT    SOCIAL HISTORY:   Significant other/partner[ x]   Children[ ]  Confucianist/Spirituality:  Substance hx:  [ ]   Tobacco hx:  [ ]   Alcohol hx: [ ]   Home Opioid hx:  [ ] I-Stop Reference No:  Living Situation: [x ]Home  [ ]Long term care  [ ]Rehab [ ]Other    ADVANCE DIRECTIVES:    DNR/MOLST  [ ]  Living Will  [ ]   DECISION MAKER(s):  [ ] Health Care Proxy(s)  [ x] Surrogate(s)  [ ] Guardian           Name(s): Phone Number(s): Wife     BASELINE (I)ADL(s) (prior to admission):  [place]: [x ]Total  [ ] Moderate [ ]Dependent    Allergies    No Known Allergies    Intolerances    MEDICATIONS  (STANDING):  argatroban Infusion 0.3 MICROgram(s)/kG/Min (1.67 mL/Hr) IV Continuous <Continuous>  artificial  tears Solution 1 Drop(s) Both EYES four times a day  atorvastatin 40 milliGRAM(s) Oral at bedtime  chlorhexidine 0.12% Liquid 15 milliLiter(s) Oral Mucosa every 12 hours  chlorhexidine 2% Cloths 1 Application(s) Topical <User Schedule>  dexMEDEtomidine Infusion 1.5 MICROgram(s)/kG/Hr (34.8 mL/Hr) IV Continuous <Continuous>  dextrose 50% Injectable 50 milliLiter(s) IV Push every 15 minutes  hydrocortisone sodium succinate Injectable 25 milliGRAM(s) IV Push every 8 hours  insulin regular Infusion 6 Unit(s)/Hr (6 mL/Hr) IV Continuous <Continuous>  meropenem  IVPB 500 milliGRAM(s) IV Intermittent every 12 hours  niCARdipine Infusion 2 mG/Hr (10 mL/Hr) IV Continuous <Continuous>  pantoprazole  Injectable 40 milliGRAM(s) IV Push two times a day  polyethylene glycol 3350 17 Gram(s) Oral daily  senna 2 Tablet(s) Oral at bedtime  sodium chloride 0.9%. 1000 milliLiter(s) (5 mL/Hr) IV Continuous <Continuous>    MEDICATIONS  (PRN):  sodium chloride 0.9% lock flush 10 milliLiter(s) IV Push every 1 hour PRN Pre/post blood products, medications, blood draw, and to maintain line patency    PRESENT SYMPTOMS: [ x]Unable to self-report  [ ] CPOT [ ] PAINADs [ ] RDOS  Source if other than patient:  [ ]Family   [ ]Team     Pain: [ ]yes [ x]no  QOL impact -   Location -                    Aggravating factors -  Quality -  Radiation -  Timing-  Severity (0-10 scale):  Minimal acceptable level (0-10 scale):     CPOT:    https://www.[facility].org/getattachment/[email or web address]/Critical-Care-Pain-Observation-Tool-(CPOT)    PAINAD Score: See PAINAD tool and score below     Dyspnea:                           [ ]Mild [ ]Moderate [ ]Severe    RDOS: See RDOS tool and score below   0 to 2  minimal or no respiratory distress   3  mild distress  4 to 6 moderate distress  >7 severe distress      Anxiety:                             [ ]Mild [ ]Moderate [ ]Severe  Fatigue:                             [ ]Mild [ ]Moderate [ ]Severe  Nausea:                             [ ]Mild [ ]Moderate [ ]Severe  Loss of appetite:              [ ]Mild [ ]Moderate [ ]Severe  Constipation:                    [ ]Mild [ ]Moderate [ ]Severe    PCSSQ[Palliative Care Spiritual Screening Question]   Severity (0-10):  Score of 4 or > indicate consideration of Chaplaincy referral.  Chaplaincy Referral: [ ] yes [x ] refused [ ] following [ ] Deferred     Caregiver [place]? : [x ] yes [ ] no [ ] Deferred [ ] Declined             Social work referral [x ] Patient & Family Centered Care Referral [ ]     Anticipatory Grief present?:  [x ] yes [ ] no  [ ] Deferred                  Social work referral [x ] Chaplaincy Referral [ ]    		  Other Symptoms:  [ ]All other review of systems negative     Palliative Performance Status Version 2:   See PPSv2 tool and score below          PHYSICAL EXAM:  Vital Signs Last 24 Hrs  T(C): 37.1 (29 Nov 2022 20:00), Max: 37.1 (29 Nov 2022 16:00)  T(F): 98.8 (29 Nov 2022 20:00), Max: 98.8 (29 Nov 2022 16:00)  HR: 67 (29 Nov 2022 22:00) (64 - 79)  BP: --  BP(mean): --  RR: 18 (29 Nov 2022 22:00) (14 - 32)  SpO2: 100% (29 Nov 2022 22:00) (97% - 100%)    Parameters below as of 29 Nov 2022 20:00  Patient On (Oxygen Delivery Method): ventilator    O2 Concentration (%): 50 I&O's Summary    28 Nov 2022 07:01  -  29 Nov 2022 07:00  --------------------------------------------------------  IN: 3382.3 mL / OUT: 3340 mL / NET: 42.3 mL    29 Nov 2022 07:01  -  29 Nov 2022 22:22  --------------------------------------------------------  IN: 2513.9 mL / OUT: 2990 mL / NET: -476.1 mL      GENERAL: [ ]Cachexia    [ ]Alert  [ ]Oriented x   [ ]Lethargic  [x ]Unarousable. On sedation   [ ]Verbal  [ ]Non-Verbal  Behavioral:   [ ] Anxiety  [ ] Delirium [ ] Agitation [ ] Other  HEENT:  [ ]Normal   [ ]Dry mouth   [x ]ET Tube/Trach  [ ]Oral lesions  PULMONARY:   [ ]Clear [ ]Tachypnea  [ ]Audible excessive secretions   [ ]Rhonchi        [ ]Right [ ]Left [ ]Bilateral  [ ]Crackles        [ ]Right [ ]Left [ ]Bilateral  [ ]Wheezing     [ ]Right [ ]Left [ ]Bilateral  [ x]Diminished breath sounds [ ]right [ ]left [x ]bilateral  CARDIOVASCULAR:    [x ]Regular [ ]Irregular [ ]Tachy  [ ]Armani [ ]Murmur [ ]Other  [x] ECMO   GASTROINTESTINAL:  [x ]Soft  [ ]Distended   [x ]+BS  [ ]Non tender [ ]Tender  [ ]Other [ ]PEG [ ]OGT/ NGT  Last BM:  GENITOURINARY:  [ ]Normal [ ] Incontinent   [ ]Oliguria/Anuria   [x ]De Dios  MUSCULOSKELETAL:   [ ]Normal   [xx ]Weakness  [ ]Bed/Wheelchair bound [ ]Edema  NEUROLOGIC:   [ ]No focal deficits  [ ]Cognitive impairment  [ ]Dysphagia [ ]Dysarthria [ ]Paresis [ x]Other: Sedated    SKIN:   [x ]Normal  [ ]Rash  [ ]Other  [ ]Pressure ulcer(s)       Present on admission [ ]y [ ]n    CRITICAL CARE:  [ ] Shock Present  [ ]Septic [ ]Cardiogenic [ ]Neurologic [ ]Hypovolemic  [ ]  Vasopressors [ ]  Inotropes   [ ]Respiratory failure present [ ]Mechanical ventilation [ ]Non-invasive ventilatory support [ ]High flow  Mode: AC/ CMV (Assist Control/ Continuous Mandatory Ventilation), RR (machine): 14, FiO2: 50, PEEP: 10, ITime: 1, MAP: 14, PC: 10, PIP: 20  [ ]Acute  [ ]Chronic [ ]Hypoxic  [ ]Hypercarbic [ ]Other  [ ]Other organ failure     LABS:                        7.8    13.44 )-----------( 93       ( 29 Nov 2022 18:28 )             24.1   11-29    152<H>  |  121<H>  |  71<H>  ----------------------------<  144<H>  3.8   |  20<L>  |  3.20<H>    Ca    8.1<L>      29 Nov 2022 18:28  Phos  3.2     11-29  Mg     2.5     11-29    TPro  5.8<L>  /  Alb  3.3  /  TBili  0.8  /  DBili  0.2  /  AST  54<H>  /  ALT  11  /  AlkPhos  67  11-29  PT/INR - ( 29 Nov 2022 00:24 )   PT: 11.6 sec;   INR: 1.01 ratio         PTT - ( 29 Nov 2022 18:23 )  PTT:47.6 sec      RADIOLOGY & ADDITIONAL STUDIES:  Reviewed   PROTEIN CALORIE MALNUTRITION PRESENT: [ ]mild [ ]moderate [ ]severe [ ]underweight [ ]morbid obesity  https://www.andeal.org/vault/2440/web/files/ONC/Table_Clinical%20Characteristics%20to%20Document%20Malnutrition-White%20JV%20et%20al%202012.pdf    Height (cm): 172.7 (11-24-22 @ 19:15)  Weight (kg): 92.9 (11-24-22 @ 07:48)  BMI (kg/m2): 31.1 (11-24-22 @ 19:15)    [ ]PPSV2 < or = to 30% [ ]significant weight loss  [ ]poor nutritional intake  [ ]anasarca[ ]Artificial Nutrition      Other REFERRALS:  [ ]Hospice  [ ]Child Life  [ ]Social Work  [ ]Case management [ ]Holistic Therapy     Goals of Care Document: HARRIS Uriostegui (11-29-22 @ 15:36)  Goals of Care Conversation:   Participants:  · Participants  Family; Staff  · Spouse  Keyanna  · Child(keyla)  son Paul and daughter Deng  · Provider  Dr. Mitchell of [facility], Dr. Holbrook of [facility]  ·   Jas Uriostegui, \[facility] [place] [facility]\""KANDY, and Ynes Casarez, NICOLE  · Other   Intern Brooke and Medical Student Manuel    Advance Directives:  · Does patient have Advance Directive  No  · Do you want to complete the HCP and name a Jimmie Care Agent  No  · Does Patient Have a Surrogate  Yes  · Surrogate's Name  spouse Keyanna who defers to daugther Deng  · Does the Patient have a Court Appointed Guardian ([address])  No  · Caregiver:  no    Conversation Discussion:  · Conversation  Diagnosis; Prognosis; Treatment Options; Chaplaincy Referral  · Conversation Details  GAP consulted to assist in GOC discussion in the setting of patient with advanced illness currently placed on [facility] ECMO. IDT meeting held with patient's family today. Team utilized Korean  through [employer] for patient's spouse, CHELITA Javier, [id number].    Team first introduced selves and roles in patient care. Family verbalized understanding and was receptive to meeting today. Team next inquired into family understanding of patient's current medical status, and patient's daughter, who is a physician, demonstrates good understanding. Family states that patient with gallstones and pancreatitis and note this leading to patient's respiratory distress and subsequent cardiac arrest. Family states that patient additionally with SDH and ERIC, and is now s/p ecmo placement and mechanical ventilation.     Team confirmed this today, and provided additional overview for family. Team discussed patient's cardiogenic shock, HF, and ARDS, and discussed ecmo further at length today as well. Team discussed that ecmo is helping to support patient's heart and lungs, but discussed it as a short-term intervention with risks including clotting and bleeding, which can further impact patient's SDH, though it is stable at present. Family verbalized understanding, and inquired into patient's ability to improve. Team discussed that patient will remain on ecmo at present and that team will continue to monitor progress with these organs and patient's kidneys as well, assessing the opportunity for recovery in the coming days. Team discussed being hopeful for recovery, but note that, should patient be unable to meaningfully recover from this status, ecmo cannot remain permanently. Team discussed plan moving forward at present is continued monitoring as well as possible future cardiac cath. Family verbalized understanding, and all questions answered today.    Patient's family appropriately tearful during meeting today. Patient's daughter discussed the difficulty in navigating her roles as a physician and as a daughter experiencing a loved one's health crisis, and notes frustrations experienced throughout. Family additionally notes much shock regarding patient's status, statin that patient with hx of of bypass in 2019, but states that patient was otherwise healthy and functional. Team validated family in the difficulty in this situation today, and much emotional support, active listening, and room for ventilation of feelings provided. Team assured ongoing support and availability as well.     Team inquired into spiritual support, as family identifies as Synagogue, but family declines at this time, stating they have been saying prayers for patient with their community.    Team lastly inquired into decision-making for patient, and patient's spouse defers to her daughter Deng at this time.    GAP will continue to follow this case, with plan for follow up meeting 12/6 at 1:30.    What Matters Most To Patient and Family:  · What matters most to patient and family  Time spent with family, don, patient care    Personal Advance Directives Treatment Guidelines:   Treatment Guidelines:  · Decision Maker  Surrogate    Location of Discussion:   Time Spent on Advance Care Planning:  I spent 60 (in minutes) on advance care planning services with the patient.  This time is separate and distinct from any other care management services provided on this date.    Location of Discussion:  · Location of discussion  Face to face      Electronic Signatures:  Jas Uriostegui ([facility] [place])  (Signed 29-Nov-2022 16:23)  	Authored: Goals of Care Conversation, Personal Advance Directives Treatment Guidelines, Location of Discussion      Last Updated: 29-Nov-2022 16:23 by Jas Uriostegui ([facility] [place])     HPI:  63 YO M with a history of CAD s/p 4v CABG ~2019 in [facility], DM2 (A1c 7.3%), and HTN who initially presented to OSH with abdominal pain and n/v and found to have pancreatitis and elevated troponins. CT abdomen revealed acute pancreatitis and his initial LVEF was 40-45%. He developed MSOF with hypoxic respiratory failure requiring intubation and ERIC and went into hypoxic PEA arrest requiring ACLS and due to persistent hypoxia and hypotension on pressors after ROSC [facility] on [facility]. Notably CTH that day revealed small subdural hemorrhage.     His post arrest TTE on 11/26 showed a signficantly worse LVEF of 5-10% with an AV that was only minimally opening. Geriatrics and Palliative Medicine (GaP) Team was called for GOC and support through [facility] ECMO     PERTINENT PM/SXH:       FAMILY HISTORY:  No h/o cardiac arrest.   Family Hx substance abuse [ ]yes [x ]no  ITEMS NOT CHECKED ARE NOT PRESENT    SOCIAL HISTORY:   Significant other/partner[ x]   Children[ ]  Spiritism/Spirituality:  Substance hx:  [ ]   Tobacco hx:  [ ]   Alcohol hx: [ ]   Home Opioid hx:  [ ] I-Stop Reference No:  Living Situation: [x ]Home  [ ]Long term care  [ ]Rehab [ ]Other    ADVANCE DIRECTIVES:    DNR/MOLST  [ ]  Living Will  [ ]   DECISION MAKER(s):  [ ] Health Care Proxy(s)  [ x] Surrogate(s)  [ ] Guardian           Name(s): Phone Number(s): Wife     BASELINE (I)ADL(s) (prior to admission):  [place]: [x ]Total  [ ] Moderate [ ]Dependent    Allergies    No Known Allergies    Intolerances    MEDICATIONS  (STANDING):  argatroban Infusion 0.3 MICROgram(s)/kG/Min (1.67 mL/Hr) IV Continuous <Continuous>  artificial  tears Solution 1 Drop(s) Both EYES four times a day  atorvastatin 40 milliGRAM(s) Oral at bedtime  chlorhexidine 0.12% Liquid 15 milliLiter(s) Oral Mucosa every 12 hours  chlorhexidine 2% Cloths 1 Application(s) Topical <User Schedule>  dexMEDEtomidine Infusion 1.5 MICROgram(s)/kG/Hr (34.8 mL/Hr) IV Continuous <Continuous>  dextrose 50% Injectable 50 milliLiter(s) IV Push every 15 minutes  hydrocortisone sodium succinate Injectable 25 milliGRAM(s) IV Push every 8 hours  insulin regular Infusion 6 Unit(s)/Hr (6 mL/Hr) IV Continuous <Continuous>  meropenem  IVPB 500 milliGRAM(s) IV Intermittent every 12 hours  niCARdipine Infusion 2 mG/Hr (10 mL/Hr) IV Continuous <Continuous>  pantoprazole  Injectable 40 milliGRAM(s) IV Push two times a day  polyethylene glycol 3350 17 Gram(s) Oral daily  senna 2 Tablet(s) Oral at bedtime  sodium chloride 0.9%. 1000 milliLiter(s) (5 mL/Hr) IV Continuous <Continuous>    MEDICATIONS  (PRN):  sodium chloride 0.9% lock flush 10 milliLiter(s) IV Push every 1 hour PRN Pre/post blood products, medications, blood draw, and to maintain line patency    PRESENT SYMPTOMS: [ x]Unable to self-report  [ ] CPOT [ ] PAINADs [ ] RDOS  Source if other than patient:  [ ]Family   [ ]Team     Pain: [ ]yes [ x]no  QOL impact -   Location -                    Aggravating factors -  Quality -  Radiation -  Timing-  Severity (0-10 scale):  Minimal acceptable level (0-10 scale):     CPOT:    https://www.[facility].org/getattachment/[email or web address]/Critical-Care-Pain-Observation-Tool-(CPOT)    PAINAD Score: See PAINAD tool and score below     Dyspnea:                           [ ]Mild [ ]Moderate [ ]Severe    RDOS: See RDOS tool and score below   0 to 2  minimal or no respiratory distress   3  mild distress  4 to 6 moderate distress  >7 severe distress      Anxiety:                             [ ]Mild [ ]Moderate [ ]Severe  Fatigue:                             [ ]Mild [ ]Moderate [ ]Severe  Nausea:                             [ ]Mild [ ]Moderate [ ]Severe  Loss of appetite:              [ ]Mild [ ]Moderate [ ]Severe  Constipation:                    [ ]Mild [ ]Moderate [ ]Severe    PCSSQ[Palliative Care Spiritual Screening Question]   Severity (0-10):  Score of 4 or > indicate consideration of Chaplaincy referral.  Chaplaincy Referral: [ ] yes [x ] refused [ ] following [ ] Deferred     Caregiver [place]? : [x ] yes [ ] no [ ] Deferred [ ] Declined             Social work referral [x ] Patient & Family Centered Care Referral [ ]     Anticipatory Grief present?:  [x ] yes [ ] no  [ ] Deferred                  Social work referral [x ] Chaplaincy Referral [ ]    		  Other Symptoms:  [ ]All other review of systems negative     Palliative Performance Status Version 2:   See PPSv2 tool and score below          PHYSICAL EXAM:  Vital Signs Last 24 Hrs  T(C): 37.1 (29 Nov 2022 20:00), Max: 37.1 (29 Nov 2022 16:00)  T(F): 98.8 (29 Nov 2022 20:00), Max: 98.8 (29 Nov 2022 16:00)  HR: 67 (29 Nov 2022 22:00) (64 - 79)  BP: --  BP(mean): --  RR: 18 (29 Nov 2022 22:00) (14 - 32)  SpO2: 100% (29 Nov 2022 22:00) (97% - 100%)    Parameters below as of 29 Nov 2022 20:00  Patient On (Oxygen Delivery Method): ventilator    O2 Concentration (%): 50 I&O's Summary    28 Nov 2022 07:01  -  29 Nov 2022 07:00  --------------------------------------------------------  IN: 3382.3 mL / OUT: 3340 mL / NET: 42.3 mL    29 Nov 2022 07:01  -  29 Nov 2022 22:22  --------------------------------------------------------  IN: 2513.9 mL / OUT: 2990 mL / NET: -476.1 mL      GENERAL: [ ]Cachexia    [ ]Alert  [ ]Oriented x   [ ]Lethargic  [x ]Unarousable. On sedation   [ ]Verbal  [ ]Non-Verbal  Behavioral:   [ ] Anxiety  [ ] Delirium [ ] Agitation [ ] Other  HEENT:  [ ]Normal   [ ]Dry mouth   [x ]ET Tube/Trach  [ ]Oral lesions  PULMONARY:   [ ]Clear [ ]Tachypnea  [ ]Audible excessive secretions   [ ]Rhonchi        [ ]Right [ ]Left [ ]Bilateral  [ ]Crackles        [ ]Right [ ]Left [ ]Bilateral  [ ]Wheezing     [ ]Right [ ]Left [ ]Bilateral  [ x]Diminished breath sounds [ ]right [ ]left [x ]bilateral  CARDIOVASCULAR:    [x ]Regular [ ]Irregular [ ]Tachy  [ ]Armani [ ]Murmur [ ]Other  [x] ECMO   GASTROINTESTINAL:  [x ]Soft  [ ]Distended   [x ]+BS  [ ]Non tender [ ]Tender  [ ]Other [ ]PEG [ ]OGT/ NGT  Last BM:  GENITOURINARY:  [ ]Normal [ ] Incontinent   [ ]Oliguria/Anuria   [x ]De Dios  MUSCULOSKELETAL:   [ ]Normal   [xx ]Weakness  [ ]Bed/Wheelchair bound [ ]Edema  NEUROLOGIC:   [ ]No focal deficits  [ ]Cognitive impairment  [ ]Dysphagia [ ]Dysarthria [ ]Paresis [ x]Other: Sedated    SKIN:   [x ]Normal  [ ]Rash  [ ]Other  [ ]Pressure ulcer(s)       Present on admission [ ]y [ ]n    CRITICAL CARE:  [ ] Shock Present  [ ]Septic [ ]Cardiogenic [ ]Neurologic [ ]Hypovolemic  [ ]  Vasopressors [ ]  Inotropes   [ ]Respiratory failure present [ ]Mechanical ventilation [ ]Non-invasive ventilatory support [ ]High flow  Mode: AC/ CMV (Assist Control/ Continuous Mandatory Ventilation), RR (machine): 14, FiO2: 50, PEEP: 10, ITime: 1, MAP: 14, PC: 10, PIP: 20  [ ]Acute  [ ]Chronic [ ]Hypoxic  [ ]Hypercarbic [ ]Other  [ ]Other organ failure     LABS:                        7.8    13.44 )-----------( 93       ( 29 Nov 2022 18:28 )             24.1   11-29    152<H>  |  121<H>  |  71<H>  ----------------------------<  144<H>  3.8   |  20<L>  |  3.20<H>    Ca    8.1<L>      29 Nov 2022 18:28  Phos  3.2     11-29  Mg     2.5     11-29    TPro  5.8<L>  /  Alb  3.3  /  TBili  0.8  /  DBili  0.2  /  AST  54<H>  /  ALT  11  /  AlkPhos  67  11-29  PT/INR - ( 29 Nov 2022 00:24 )   PT: 11.6 sec;   INR: 1.01 ratio         PTT - ( 29 Nov 2022 18:23 )  PTT:47.6 sec      RADIOLOGY & ADDITIONAL STUDIES:  Reviewed   PROTEIN CALORIE MALNUTRITION PRESENT: [ ]mild [ ]moderate [ ]severe [ ]underweight [ ]morbid obesity  https://www.andeal.org/vault/2440/web/files/ONC/Table_Clinical%20Characteristics%20to%20Document%20Malnutrition-White%20JV%20et%20al%202012.pdf    Height (cm): 172.7 (11-24-22 @ 19:15)  Weight (kg): 92.9 (11-24-22 @ 07:48)  BMI (kg/m2): 31.1 (11-24-22 @ 19:15)    [ ]PPSV2 < or = to 30% [ ]significant weight loss  [ ]poor nutritional intake  [ ]anasarca[ ]Artificial Nutrition      Other REFERRALS:  [ ]Hospice  [ ]Child Life  [ ]Social Work  [ ]Case management [ ]Holistic Therapy     Goals of Care Document: HARRIS Uriostegui (11-29-22 @ 15:36)  Goals of Care Conversation:   Participants:  · Participants  Family; Staff  · Spouse  Keyanna  · Child(keyla)  son Paul and daughter Deng  · Provider  Dr. Mitchell of [facility], Dr. Holbrook of [facility]  ·   Jas Uriostegui, [facility] [place]Y, and Ynes Casarez, NICOLE  · Other   Intern Brooke and Medical Student Manuel    Advance Directives:  · Does patient have Advance Directive  No  · Do you want to complete the HCP and name a Jimmie Care Agent  No  · Does Patient Have a Surrogate  Yes  · Surrogate's Name  spouse Keyanna who defers to daugther Deng  · Does the Patient have a Court Appointed Guardian ([address])  No  · Caregiver:  no    Conversation Discussion:  · Conversation  Diagnosis; Prognosis; Treatment Options; Chaplaincy Referral  · Conversation Details  GAP consulted to assist in GOC discussion in the setting of patient with advanced illness currently placed on [facility]. IDT meeting held with patient's family today. Team utilized German  through [employer] for patient's spouse, CHELITA Javier, [phone].    Team first introduced selves and roles in patient care. Family verbalized understanding and was receptive to meeting today. Team next inquired into family understanding of patient's current medical status, and patient's daughter, who is a physician, demonstrates good understanding. Family states that patient with gallstones and pancreatitis and note this leading to patient's respiratory distress and subsequent cardiac arrest. Family states that patient additionally with SDH and ERIC, and is now s/p ecmo placement and mechanical ventilation.     Team confirmed this today, and provided additional overview for family. Team discussed patient's cardiogenic shock, HF, and ARDS, and discussed ecmo further at length today as well. Team discussed that ecmo is helping to support patient's heart and lungs, but discussed it as a short-term intervention with risks including clotting and bleeding, which can further impact patient's SDH, though it is stable at present. Family verbalized understanding, and inquired into patient's ability to improve. Team discussed that patient will remain on ecmo at present and that team will continue to monitor progress with these organs and patient's kidneys as well, assessing the opportunity for recovery in the coming days. Team discussed being hopeful for recovery, but note that, should patient be unable to meaningfully recover from this status, ecmo cannot remain permanently. Team discussed plan moving forward at present is continued monitoring as well as possible future cardiac cath. Family verbalized understanding, and all questions answered today.    Patient's family appropriately tearful during meeting today. Patient's daughter discussed the difficulty in navigating her roles as a physician and as a daughter experiencing a loved one's health crisis, and notes frustrations experienced throughout. Family additionally notes much shock regarding patient's status, statin that patient with hx of of bypass in 2019, but states that patient was otherwise healthy and functional. Team validated family in the difficulty in this situation today, and much emotional support, active listening, and room for ventilation of feelings provided. Team assured ongoing support and availability as well.     Team inquired into spiritual support, as family identifies as Christian, but family declines at this time, stating they have been saying prayers for patient with their community.    Team lastly inquired into decision-making for patient, and patient's spouse defers to her daughter Deng at this time.    GAP will continue to follow this case, with plan for follow up meeting 12/6 at 1:30.    What Matters Most To Patient and Family:  · What matters most to patient and family  Time spent with family, don, patient care    Personal Advance Directives Treatment Guidelines:   Treatment Guidelines:  · Decision Maker  Surrogate    Location of Discussion:   Time Spent on Advance Care Planning:  I spent 60 (in minutes) on advance care planning services with the patient.  This time is separate and distinct from any other care management services provided on this date.    Location of Discussion:  · Location of discussion  Face to face      Electronic Signatures:  Jas Uriostegui ([facility] [place])  (Signed 29-Nov-2022 16:23)  	Authored: Goals of Care Conversation, Personal Advance Directives Treatment Guidelines, Location of Discussion      Last Updated: 29-Nov-2022 16:23 by Jas Uriostegui ([facility] [place])     HPI:  63 YO M with a history of CAD s/p 4v CABG ~2019 in [facility], DM2 (A1c 7.3%), and HTN who initially presented to OSH with abdominal pain and n/v and found to have pancreatitis and elevated troponins. CT abdomen revealed acute pancreatitis and his initial LVEF was 40-45%. He developed MSOF with hypoxic respiratory failure requiring intubation and ERIC and went into hypoxic PEA arrest requiring ACLS and due to persistent hypoxia and hypotension on pressors after ROSC was cannulated to [facility] on 11/25. Notably CTH that day revealed small subdural hemorrhage.     His post arrest TTE on 11/26 showed a signficantly worse LVEF of 5-10% with an AV that was only minimally opening. Geriatrics and Palliative Medicine (GaP) Team was called for GOC and support through [facility] ECMO     PERTINENT PM/SXH:       FAMILY HISTORY:  No h/o cardiac arrest.   Family Hx substance abuse [ ]yes [x ]no  ITEMS NOT CHECKED ARE NOT PRESENT    SOCIAL HISTORY:   Significant other/partner[ x]   Children[ ]  Buddhism/Spirituality:  Substance hx:  [ ]   Tobacco hx:  [ ]   Alcohol hx: [ ]   Home Opioid hx:  [ ] I-Stop Reference No:  Living Situation: [x ]Home  [ ]Long term care  [ ]Rehab [ ]Other    ADVANCE DIRECTIVES:    DNR/MOLST  [ ]  Living Will  [ ]   DECISION MAKER(s):  [ ] Health Care Proxy(s)  [ x] Surrogate(s)  [ ] Guardian           Name(s): Phone Number(s): Wife     BASELINE (I)ADL(s) (prior to admission):  [place]: [x ]Total  [ ] Moderate [ ]Dependent    Allergies    No Known Allergies    Intolerances    MEDICATIONS  (STANDING):  argatroban Infusion 0.3 MICROgram(s)/kG/Min (1.67 mL/Hr) IV Continuous <Continuous>  artificial  tears Solution 1 Drop(s) Both EYES four times a day  atorvastatin 40 milliGRAM(s) Oral at bedtime  chlorhexidine 0.12% Liquid 15 milliLiter(s) Oral Mucosa every 12 hours  chlorhexidine 2% Cloths 1 Application(s) Topical <User Schedule>  dexMEDEtomidine Infusion 1.5 MICROgram(s)/kG/Hr (34.8 mL/Hr) IV Continuous <Continuous>  dextrose 50% Injectable 50 milliLiter(s) IV Push every 15 minutes  hydrocortisone sodium succinate Injectable 25 milliGRAM(s) IV Push every 8 hours  insulin regular Infusion 6 Unit(s)/Hr (6 mL/Hr) IV Continuous <Continuous>  meropenem  IVPB 500 milliGRAM(s) IV Intermittent every 12 hours  niCARdipine Infusion 2 mG/Hr (10 mL/Hr) IV Continuous <Continuous>  pantoprazole  Injectable 40 milliGRAM(s) IV Push two times a day  polyethylene glycol 3350 17 Gram(s) Oral daily  senna 2 Tablet(s) Oral at bedtime  sodium chloride 0.9%. 1000 milliLiter(s) (5 mL/Hr) IV Continuous <Continuous>    MEDICATIONS  (PRN):  sodium chloride 0.9% lock flush 10 milliLiter(s) IV Push every 1 hour PRN Pre/post blood products, medications, blood draw, and to maintain line patency    PRESENT SYMPTOMS: [ x]Unable to self-report  [ ] CPOT [ ] PAINADs [ ] RDOS  Source if other than patient:  [ ]Family   [ ]Team     Pain: [ ]yes [ x]no  QOL impact -   Location -                    Aggravating factors -  Quality -  Radiation -  Timing-  Severity (0-10 scale):  Minimal acceptable level (0-10 scale):     CPOT:    https://www.[facility].org/getattachment/[email or web address]/Critical-Care-Pain-Observation-Tool-(CPOT)    PAINAD Score: See PAINAD tool and score below     Dyspnea:                           [ ]Mild [ ]Moderate [ ]Severe    RDOS: See RDOS tool and score below   0 to 2  minimal or no respiratory distress   3  mild distress  4 to 6 moderate distress  >7 severe distress      Anxiety:                             [ ]Mild [ ]Moderate [ ]Severe  Fatigue:                             [ ]Mild [ ]Moderate [ ]Severe  Nausea:                             [ ]Mild [ ]Moderate [ ]Severe  Loss of appetite:              [ ]Mild [ ]Moderate [ ]Severe  Constipation:                    [ ]Mild [ ]Moderate [ ]Severe    PCSSQ[Palliative Care Spiritual Screening Question]   Severity (0-10):  Score of 4 or > indicate consideration of Chaplaincy referral.  Chaplaincy Referral: [ ] yes [x ] refused [ ] following [ ] Deferred     Caregiver [place]? : [x ] yes [ ] no [ ] Deferred [ ] Declined             Social work referral [x ] Patient & Family Centered Care Referral [ ]     Anticipatory Grief present?:  [x ] yes [ ] no  [ ] Deferred                  Social work referral [x ] Chaplaincy Referral [ ]    		  Other Symptoms:  [ ]All other review of systems negative     Palliative Performance Status Version 2:   See PPSv2 tool and score below          PHYSICAL EXAM:  Vital Signs Last 24 Hrs  T(C): 37.1 (29 Nov 2022 20:00), Max: 37.1 (29 Nov 2022 16:00)  T(F): 98.8 (29 Nov 2022 20:00), Max: 98.8 (29 Nov 2022 16:00)  HR: 67 (29 Nov 2022 22:00) (64 - 79)  BP: --  BP(mean): --  RR: 18 (29 Nov 2022 22:00) (14 - 32)  SpO2: 100% (29 Nov 2022 22:00) (97% - 100%)    Parameters below as of 29 Nov 2022 20:00  Patient On (Oxygen Delivery Method): ventilator    O2 Concentration (%): 50 I&O's Summary    28 Nov 2022 07:01  -  29 Nov 2022 07:00  --------------------------------------------------------  IN: 3382.3 mL / OUT: 3340 mL / NET: 42.3 mL    29 Nov 2022 07:01  -  29 Nov 2022 22:22  --------------------------------------------------------  IN: 2513.9 mL / OUT: 2990 mL / NET: -476.1 mL      GENERAL: [ ]Cachexia    [ ]Alert  [ ]Oriented x   [ ]Lethargic  [x ]Unarousable. On sedation   [ ]Verbal  [ ]Non-Verbal  Behavioral:   [ ] Anxiety  [ ] Delirium [ ] Agitation [ ] Other  HEENT:  [ ]Normal   [ ]Dry mouth   [x ]ET Tube/Trach  [ ]Oral lesions  PULMONARY:   [ ]Clear [ ]Tachypnea  [ ]Audible excessive secretions   [ ]Rhonchi        [ ]Right [ ]Left [ ]Bilateral  [ ]Crackles        [ ]Right [ ]Left [ ]Bilateral  [ ]Wheezing     [ ]Right [ ]Left [ ]Bilateral  [ x]Diminished breath sounds [ ]right [ ]left [x ]bilateral  CARDIOVASCULAR:    [x ]Regular [ ]Irregular [ ]Tachy  [ ]Armani [ ]Murmur [ ]Other  [x] ECMO   GASTROINTESTINAL:  [x ]Soft  [ ]Distended   [x ]+BS  [ ]Non tender [ ]Tender  [ ]Other [ ]PEG [ ]OGT/ NGT  Last BM:  GENITOURINARY:  [ ]Normal [ ] Incontinent   [ ]Oliguria/Anuria   [x ]De Dios  MUSCULOSKELETAL:   [ ]Normal   [xx ]Weakness  [ ]Bed/Wheelchair bound [ ]Edema  NEUROLOGIC:   [ ]No focal deficits  [ ]Cognitive impairment  [ ]Dysphagia [ ]Dysarthria [ ]Paresis [ x]Other: Sedated    SKIN:   [x ]Normal  [ ]Rash  [ ]Other  [ ]Pressure ulcer(s)       Present on admission [ ]y [ ]n    CRITICAL CARE:  [ ] Shock Present  [ ]Septic [ ]Cardiogenic [ ]Neurologic [ ]Hypovolemic  [ ]  Vasopressors [ ]  Inotropes   [ ]Respiratory failure present [ ]Mechanical ventilation [ ]Non-invasive ventilatory support [ ]High flow  Mode: AC/ CMV (Assist Control/ Continuous Mandatory Ventilation), RR (machine): 14, FiO2: 50, PEEP: 10, ITime: 1, MAP: 14, PC: 10, PIP: 20  [ ]Acute  [ ]Chronic [ ]Hypoxic  [ ]Hypercarbic [ ]Other  [ ]Other organ failure     LABS:                        7.8    13.44 )-----------( 93       ( 29 Nov 2022 18:28 )             24.1   11-29    152<H>  |  121<H>  |  71<H>  ----------------------------<  144<H>  3.8   |  20<L>  |  3.20<H>    Ca    8.1<L>      29 Nov 2022 18:28  Phos  3.2     11-29  Mg     2.5     11-29    TPro  5.8<L>  /  Alb  3.3  /  TBili  0.8  /  DBili  0.2  /  AST  54<H>  /  ALT  11  /  AlkPhos  67  11-29  PT/INR - ( 29 Nov 2022 00:24 )   PT: 11.6 sec;   INR: 1.01 ratio         PTT - ( 29 Nov 2022 18:23 )  PTT:47.6 sec      RADIOLOGY & ADDITIONAL STUDIES:  Reviewed   PROTEIN CALORIE MALNUTRITION PRESENT: [ ]mild [ ]moderate [ ]severe [ ]underweight [ ]morbid obesity  https://www.andeal.org/vault/2440/web/files/ONC/Table_Clinical%20Characteristics%20to%20Document%20Malnutrition-White%20JV%20et%20al%202012.pdf    Height (cm): 172.7 (11-24-22 @ 19:15)  Weight (kg): 92.9 (11-24-22 @ 07:48)  BMI (kg/m2): 31.1 (11-24-22 @ 19:15)    [ ]PPSV2 < or = to 30% [ ]significant weight loss  [ ]poor nutritional intake  [ ]anasarca[ ]Artificial Nutrition      Other REFERRALS:  [ ]Hospice  [ ]Child Life  [ ]Social Work  [ ]Case management [ ]Holistic Therapy     Goals of Care Document: HARRIS Uriostegui (11-29-22 @ 15:36)  Goals of Care Conversation:   Participants:  · Participants  Family; Staff  · Spouse  Keyanna  · Child(keyla)  son Paul and daughter Deng  · Provider  Dr. Mitchell of [facility], Dr. Holbrook of [facility]  ·   Jas Uriostegui, [facility], and Ynes Casarez, NICOLE  · Other   Intern Brooke and Medical Student Manuel    Advance Directives:  · Does patient have Advance Directive  No  · Do you want to complete the HCP and name a Jimmie Care Agent  No  · Does Patient Have a Surrogate  Yes  · Surrogate's Name  spouse Keyanna who defers to daugther Deng  · Does the Patient have a Court Appointed Guardian ([address])  No  · Caregiver:  no    Conversation Discussion:  · Conversation  Diagnosis; Prognosis; Treatment Options; Chaplaincy Referral  · Conversation Details  [facility] consulted to assist in GOC discussion in the setting of patient with advanced illness currently placed on [facility]. IDT meeting held with patient's family today. Team utilized Sami  through [employer] for patient's spouse, CHELITA Javier, [id number].    Team first introduced selves and roles in patient care. Family verbalized understanding and was receptive to meeting today. Team next inquired into family understanding of patient's current medical status, and patient's daughter, who is a physician, demonstrates good understanding. Family states that patient with gallstones and pancreatitis and note this leading to patient's respiratory distress and subsequent cardiac arrest. Family states that patient additionally with SDH and ERIC, and is now s/p ecmo placement and mechanical ventilation.     Team confirmed this today, and provided additional overview for family. Team discussed patient's cardiogenic shock, HF, and ARDS, and discussed ecmo further at length today as well. Team discussed that ecmo is helping to support patient's heart and lungs, but discussed it as a short-term intervention with risks including clotting and bleeding, which can further impact patient's SDH, though it is stable at present. Family verbalized understanding, and inquired into patient's ability to improve. Team discussed that patient will remain on ecmo at present and that team will continue to monitor progress with these organs and patient's kidneys as well, assessing the opportunity for recovery in the coming days. Team discussed being hopeful for recovery, but note that, should patient be unable to meaningfully recover from this status, ecmo cannot remain permanently. Team discussed plan moving forward at present is continued monitoring as well as possible future cardiac cath. Family verbalized understanding, and all questions answered today.    Patient's family appropriately tearful during meeting today. Patient's daughter discussed the difficulty in navigating her roles as a physician and as a daughter experiencing a loved one's health crisis, and notes frustrations experienced throughout. Family additionally notes much shock regarding patient's status, statin that patient with hx of of bypass in 2019, but states that patient was otherwise healthy and functional. Team validated family in the difficulty in this situation today, and much emotional support, active listening, and room for ventilation of feelings provided. Team assured ongoing support and availability as well.     Team inquired into spiritual support, as family identifies as Zoroastrian, but family declines at this time, stating they have been saying prayers for patient with their community.    Team lastly inquired into decision-making for patient, and patient's spouse defers to her daughter Deng at this time.    [facility] will continue to follow this case, with plan for follow up meeting 12/6 at 1:30.    What Matters Most To Patient and Family:  · What matters most to patient and family  Time spent with family, don, patient care    Personal Advance Directives Treatment Guidelines:   Treatment Guidelines:  · Decision Maker  Surrogate    Location of Discussion:   Time Spent on Advance Care Planning:  I spent 60 (in minutes) on advance care planning services with the patient.  This time is separate and distinct from any other care management services provided on this date.    Location of Discussion:  · Location of discussion  Face to face      Electronic Signatures:  Jas Uriostegui ([facility] [place])  (Signed 29-Nov-2022 16:23)  	Authored: Goals of Care Conversation, Personal Advance Directives Treatment Guidelines, Location of Discussion      Last Updated: 29-Nov-2022 16:23 by Jas Uriostegui ([facility] [place])

## 2022-11-30 DIAGNOSIS — Z51.5 ENCOUNTER FOR PALLIATIVE CARE: ICD-10-CM

## 2022-11-30 LAB
ALBUMIN SERPL ELPH-MCNC: 3.2 G/DL — LOW (ref 3.3–5)
ALP SERPL-CCNC: 70 U/L — SIGNIFICANT CHANGE UP (ref 40–120)
ALP SERPL-CCNC: 79 U/L — SIGNIFICANT CHANGE UP (ref 40–120)
ALT FLD-CCNC: 10 U/L — SIGNIFICANT CHANGE UP (ref 10–45)
ALT FLD-CCNC: 7 U/L — LOW (ref 10–45)
AMYLASE P1 CFR SERPL: 197 U/L — HIGH (ref 25–125)
ANION GAP SERPL CALC-SCNC: 13 MMOL/L — SIGNIFICANT CHANGE UP (ref 5–17)
ANION GAP SERPL CALC-SCNC: 14 MMOL/L — SIGNIFICANT CHANGE UP (ref 5–17)
APTT BLD: 46.7 SEC — HIGH (ref 27.5–35.5)
APTT BLD: 49 SEC — HIGH (ref 27.5–35.5)
APTT BLD: 50.4 SEC — HIGH (ref 27.5–35.5)
AST SERPL-CCNC: 38 U/L — SIGNIFICANT CHANGE UP (ref 10–40)
AST SERPL-CCNC: 42 U/L — HIGH (ref 10–40)
BASE EXCESS BLDV CALC-SCNC: -4.7 MMOL/L — LOW (ref -2–3)
BASE EXCESS BLDV CALC-SCNC: -5.7 MMOL/L — LOW (ref -2–3)
BILIRUB SERPL-MCNC: 0.8 MG/DL — SIGNIFICANT CHANGE UP (ref 0.2–1.2)
BUN SERPL-MCNC: 67 MG/DL — HIGH (ref 7–23)
BUN SERPL-MCNC: 69 MG/DL — HIGH (ref 7–23)
CALCIUM SERPL-MCNC: 7.9 MG/DL — LOW (ref 8.4–10.5)
CALCIUM SERPL-MCNC: 8 MG/DL — LOW (ref 8.4–10.5)
CHLORIDE SERPL-SCNC: 122 MMOL/L — HIGH (ref 96–108)
CHLORIDE SERPL-SCNC: 123 MMOL/L — HIGH (ref 96–108)
CO2 BLDV-SCNC: 20 MMOL/L — LOW (ref 22–26)
CO2 BLDV-SCNC: 22 MMOL/L — SIGNIFICANT CHANGE UP (ref 22–26)
CO2 SERPL-SCNC: 18 MMOL/L — LOW (ref 22–31)
CO2 SERPL-SCNC: 19 MMOL/L — LOW (ref 22–31)
CREAT SERPL-MCNC: 3.02 MG/DL — HIGH (ref 0.5–1.3)
CREAT SERPL-MCNC: 3.1 MG/DL — HIGH (ref 0.5–1.3)
CULTURE RESULTS: SIGNIFICANT CHANGE UP
EGFR: 22 ML/MIN/1.73M2 — LOW
EGFR: 23 ML/MIN/1.73M2 — LOW
FIBRINOGEN PPP-MCNC: 535 MG/DL — HIGH (ref 200–445)
GAS PNL BLDA: SIGNIFICANT CHANGE UP
GAS PNL BLDV: SIGNIFICANT CHANGE UP
GLUCOSE BLDC GLUCOMTR-MCNC: 117 MG/DL — HIGH (ref 70–99)
GLUCOSE BLDC GLUCOMTR-MCNC: 126 MG/DL — HIGH (ref 70–99)
GLUCOSE BLDC GLUCOMTR-MCNC: 128 MG/DL — HIGH (ref 70–99)
GLUCOSE BLDC GLUCOMTR-MCNC: 132 MG/DL — HIGH (ref 70–99)
GLUCOSE BLDC GLUCOMTR-MCNC: 133 MG/DL — HIGH (ref 70–99)
GLUCOSE BLDC GLUCOMTR-MCNC: 136 MG/DL — HIGH (ref 70–99)
GLUCOSE BLDC GLUCOMTR-MCNC: 137 MG/DL — HIGH (ref 70–99)
GLUCOSE BLDC GLUCOMTR-MCNC: 139 MG/DL — HIGH (ref 70–99)
GLUCOSE BLDC GLUCOMTR-MCNC: 140 MG/DL — HIGH (ref 70–99)
GLUCOSE BLDC GLUCOMTR-MCNC: 141 MG/DL — HIGH (ref 70–99)
GLUCOSE BLDC GLUCOMTR-MCNC: 142 MG/DL — HIGH (ref 70–99)
GLUCOSE BLDC GLUCOMTR-MCNC: 143 MG/DL — HIGH (ref 70–99)
GLUCOSE BLDC GLUCOMTR-MCNC: 146 MG/DL — HIGH (ref 70–99)
GLUCOSE BLDC GLUCOMTR-MCNC: 147 MG/DL — HIGH (ref 70–99)
GLUCOSE BLDC GLUCOMTR-MCNC: 155 MG/DL — HIGH (ref 70–99)
GLUCOSE BLDC GLUCOMTR-MCNC: 158 MG/DL — HIGH (ref 70–99)
GLUCOSE SERPL-MCNC: 137 MG/DL — HIGH (ref 70–99)
GLUCOSE SERPL-MCNC: 139 MG/DL — HIGH (ref 70–99)
GRAM STN FLD: SIGNIFICANT CHANGE UP
HAPTOGLOB SERPL-MCNC: 173 MG/DL — SIGNIFICANT CHANGE UP (ref 34–200)
HCO3 BLDV-SCNC: 19 MMOL/L — LOW (ref 22–29)
HCO3 BLDV-SCNC: 20 MMOL/L — LOW (ref 22–29)
HCT VFR BLD CALC: 24.6 % — LOW (ref 39–50)
HGB BLD-MCNC: 8 G/DL — LOW (ref 13–17)
HOROWITZ INDEX BLDV+IHG-RTO: 100 — SIGNIFICANT CHANGE UP
INR BLD: 1.33 RATIO — HIGH (ref 0.88–1.16)
INR BLD: 1.38 RATIO — HIGH (ref 0.88–1.16)
LDH SERPL L TO P-CCNC: 527 U/L — HIGH (ref 50–242)
LIDOCAIN IGE QN: 299 U/L — HIGH (ref 7–60)
MAGNESIUM SERPL-MCNC: 2.9 MG/DL — HIGH (ref 1.6–2.6)
MCHC RBC-ENTMCNC: 29.3 PG — SIGNIFICANT CHANGE UP (ref 27–34)
MCHC RBC-ENTMCNC: 32.5 GM/DL — SIGNIFICANT CHANGE UP (ref 32–36)
MCV RBC AUTO: 90.1 FL — SIGNIFICANT CHANGE UP (ref 80–100)
NRBC # BLD: 0 /100 WBCS — SIGNIFICANT CHANGE UP (ref 0–0)
PCO2 BLDV: 36 MMHG — LOW (ref 42–55)
PCO2 BLDV: 37 MMHG — LOW (ref 42–55)
PH BLDV: 7.34 — SIGNIFICANT CHANGE UP (ref 7.32–7.43)
PH BLDV: 7.35 — SIGNIFICANT CHANGE UP (ref 7.32–7.43)
PHOSPHATE SERPL-MCNC: 2.3 MG/DL — LOW (ref 2.5–4.5)
PLATELET # BLD AUTO: 93 K/UL — LOW (ref 150–400)
PO2 BLDV: 40 MMHG — SIGNIFICANT CHANGE UP (ref 25–45)
PO2 BLDV: 44 MMHG — SIGNIFICANT CHANGE UP (ref 25–45)
POTASSIUM BLDA-SCNC: 3.8 MMOL/L — SIGNIFICANT CHANGE UP (ref 3.5–5.1)
POTASSIUM SERPL-MCNC: 3.6 MMOL/L — SIGNIFICANT CHANGE UP (ref 3.5–5.3)
POTASSIUM SERPL-MCNC: 4.1 MMOL/L — SIGNIFICANT CHANGE UP (ref 3.5–5.3)
POTASSIUM SERPL-SCNC: 3.6 MMOL/L — SIGNIFICANT CHANGE UP (ref 3.5–5.3)
POTASSIUM SERPL-SCNC: 4.1 MMOL/L — SIGNIFICANT CHANGE UP (ref 3.5–5.3)
PROCALCITONIN SERPL-MCNC: 4.03 NG/ML — HIGH (ref 0.02–0.1)
PROT SERPL-MCNC: 5.8 G/DL — LOW (ref 6–8.3)
PROT SERPL-MCNC: 5.9 G/DL — LOW (ref 6–8.3)
PROTHROM AB SERPL-ACNC: 15.5 SEC — HIGH (ref 10.5–13.4)
PROTHROM AB SERPL-ACNC: 16.1 SEC — HIGH (ref 10.5–13.4)
RBC # BLD: 2.73 M/UL — LOW (ref 4.2–5.8)
RBC # FLD: 15.6 % — HIGH (ref 10.3–14.5)
SAO2 % BLDV: 69.1 % — SIGNIFICANT CHANGE UP (ref 67–88)
SAO2 % BLDV: 75.2 % — SIGNIFICANT CHANGE UP (ref 67–88)
SODIUM SERPL-SCNC: 154 MMOL/L — HIGH (ref 135–145)
SODIUM SERPL-SCNC: 155 MMOL/L — HIGH (ref 135–145)
SPECIMEN SOURCE: SIGNIFICANT CHANGE UP
WBC # BLD: 13.1 K/UL — HIGH (ref 3.8–10.5)
WBC # FLD AUTO: 13.1 K/UL — HIGH (ref 3.8–10.5)

## 2022-11-30 PROCEDURE — 71045 X-RAY EXAM CHEST 1 VIEW: CPT | Mod: 26,76

## 2022-11-30 PROCEDURE — 99291 CRITICAL CARE FIRST HOUR: CPT | Mod: GC

## 2022-11-30 PROCEDURE — 99233 SBSQ HOSP IP/OBS HIGH 50: CPT | Mod: GC

## 2022-11-30 PROCEDURE — 31624 DX BRONCHOSCOPE/LAVAGE: CPT

## 2022-11-30 PROCEDURE — 99291 CRITICAL CARE FIRST HOUR: CPT | Mod: 25

## 2022-11-30 PROCEDURE — 93306 TTE W/DOPPLER COMPLETE: CPT | Mod: 26

## 2022-11-30 PROCEDURE — 99232 SBSQ HOSP IP/OBS MODERATE 35: CPT

## 2022-11-30 RX ORDER — HYDROCORTISONE 20 MG
25 TABLET ORAL DAILY
Refills: 0 | Status: COMPLETED | OUTPATIENT
Start: 2022-12-01 | End: 2022-12-01

## 2022-11-30 RX ORDER — CALCIUM GLUCONATE 100 MG/ML
2 VIAL (ML) INTRAVENOUS ONCE
Refills: 0 | Status: COMPLETED | OUTPATIENT
Start: 2022-11-30 | End: 2022-11-30

## 2022-11-30 RX ORDER — POTASSIUM CHLORIDE 20 MEQ
10 PACKET (EA) ORAL
Refills: 0 | Status: COMPLETED | OUTPATIENT
Start: 2022-11-30 | End: 2022-11-30

## 2022-11-30 RX ORDER — POTASSIUM CHLORIDE 20 MEQ
20 PACKET (EA) ORAL ONCE
Refills: 0 | Status: COMPLETED | OUTPATIENT
Start: 2022-11-30 | End: 2022-11-30

## 2022-11-30 RX ORDER — ALBUMIN HUMAN 25 %
50 VIAL (ML) INTRAVENOUS EVERY 6 HOURS
Refills: 0 | Status: COMPLETED | OUTPATIENT
Start: 2022-11-30 | End: 2022-11-30

## 2022-11-30 RX ORDER — BUMETANIDE 0.25 MG/ML
2 INJECTION INTRAMUSCULAR; INTRAVENOUS EVERY 12 HOURS
Refills: 0 | Status: DISCONTINUED | OUTPATIENT
Start: 2022-11-30 | End: 2022-12-02

## 2022-11-30 RX ORDER — BUMETANIDE 0.25 MG/ML
2 INJECTION INTRAMUSCULAR; INTRAVENOUS ONCE
Refills: 0 | Status: COMPLETED | OUTPATIENT
Start: 2022-11-30 | End: 2022-11-30

## 2022-11-30 RX ORDER — HYDROCORTISONE 20 MG
25 TABLET ORAL EVERY 12 HOURS
Refills: 0 | Status: COMPLETED | OUTPATIENT
Start: 2022-11-30 | End: 2022-11-30

## 2022-11-30 RX ORDER — SODIUM BICARBONATE 1 MEQ/ML
50 SYRINGE (ML) INTRAVENOUS ONCE
Refills: 0 | Status: COMPLETED | OUTPATIENT
Start: 2022-11-30 | End: 2022-11-30

## 2022-11-30 RX ADMIN — Medication 50 MILLIEQUIVALENT(S): at 22:35

## 2022-11-30 RX ADMIN — CHLORHEXIDINE GLUCONATE 15 MILLILITER(S): 213 SOLUTION TOPICAL at 05:00

## 2022-11-30 RX ADMIN — BUMETANIDE 2 MILLIGRAM(S): 0.25 INJECTION INTRAMUSCULAR; INTRAVENOUS at 04:54

## 2022-11-30 RX ADMIN — MEROPENEM 100 MILLIGRAM(S): 1 INJECTION INTRAVENOUS at 17:58

## 2022-11-30 RX ADMIN — DEXMEDETOMIDINE HYDROCHLORIDE IN 0.9% SODIUM CHLORIDE 34.8 MICROGRAM(S)/KG/HR: 4 INJECTION INTRAVENOUS at 19:53

## 2022-11-30 RX ADMIN — Medication 200 GRAM(S): at 15:00

## 2022-11-30 RX ADMIN — MEROPENEM 100 MILLIGRAM(S): 1 INJECTION INTRAVENOUS at 05:01

## 2022-11-30 RX ADMIN — ARGATROBAN 1.06 MICROGRAM(S)/KG/MIN: 50 INJECTION, SOLUTION INTRAVENOUS at 19:53

## 2022-11-30 RX ADMIN — POLYETHYLENE GLYCOL 3350 17 GRAM(S): 17 POWDER, FOR SOLUTION ORAL at 13:04

## 2022-11-30 RX ADMIN — SODIUM CHLORIDE 5 MILLILITER(S): 9 INJECTION INTRAMUSCULAR; INTRAVENOUS; SUBCUTANEOUS at 00:34

## 2022-11-30 RX ADMIN — Medication 20 MILLIEQUIVALENT(S): at 15:22

## 2022-11-30 RX ADMIN — CHLORHEXIDINE GLUCONATE 1 APPLICATION(S): 213 SOLUTION TOPICAL at 05:00

## 2022-11-30 RX ADMIN — PANTOPRAZOLE SODIUM 40 MILLIGRAM(S): 20 TABLET, DELAYED RELEASE ORAL at 17:58

## 2022-11-30 RX ADMIN — Medication 50 MILLILITER(S): at 10:22

## 2022-11-30 RX ADMIN — INSULIN HUMAN 6 UNIT(S)/HR: 100 INJECTION, SOLUTION SUBCUTANEOUS at 19:53

## 2022-11-30 RX ADMIN — NICARDIPINE HYDROCHLORIDE 10 MG/HR: 30 CAPSULE, EXTENDED RELEASE ORAL at 19:53

## 2022-11-30 RX ADMIN — Medication 1 DROP(S): at 17:57

## 2022-11-30 RX ADMIN — ATORVASTATIN CALCIUM 40 MILLIGRAM(S): 80 TABLET, FILM COATED ORAL at 21:37

## 2022-11-30 RX ADMIN — CHLORHEXIDINE GLUCONATE 15 MILLILITER(S): 213 SOLUTION TOPICAL at 17:57

## 2022-11-30 RX ADMIN — Medication 50 MILLIEQUIVALENT(S): at 23:04

## 2022-11-30 RX ADMIN — Medication 1 DROP(S): at 23:06

## 2022-11-30 RX ADMIN — Medication 50 MILLIEQUIVALENT(S): at 17:58

## 2022-11-30 RX ADMIN — Medication 1 DROP(S): at 05:01

## 2022-11-30 RX ADMIN — Medication 25 MILLIGRAM(S): at 17:58

## 2022-11-30 RX ADMIN — PANTOPRAZOLE SODIUM 40 MILLIGRAM(S): 20 TABLET, DELAYED RELEASE ORAL at 05:03

## 2022-11-30 RX ADMIN — SENNA PLUS 2 TABLET(S): 8.6 TABLET ORAL at 21:37

## 2022-11-30 RX ADMIN — BUMETANIDE 2 MILLIGRAM(S): 0.25 INJECTION INTRAMUSCULAR; INTRAVENOUS at 17:57

## 2022-11-30 RX ADMIN — Medication 1 DROP(S): at 13:06

## 2022-11-30 RX ADMIN — Medication 25 MILLIGRAM(S): at 04:54

## 2022-11-30 RX ADMIN — Medication 50 MILLIEQUIVALENT(S): at 23:13

## 2022-11-30 NOTE — PROGRESS NOTE ADULT - SUBJECTIVE AND OBJECTIVE BOX
Patient seen and examined at the bedside.    Remained critically ill on continuous ICU monitoring.    OBJECTIVE:  Vital Signs Last 24 Hrs  T(C): 37.4 (30 Nov 2022 20:00), Max: 37.4 (30 Nov 2022 20:00)  T(F): 99.3 (30 Nov 2022 20:00), Max: 99.3 (30 Nov 2022 20:00)  HR: 76 (30 Nov 2022 20:45) (60 - 76)  BP: --  BP(mean): --  RR: 25 (30 Nov 2022 20:45) (14 - 33)  SpO2: 99% (30 Nov 2022 20:45) (99% - 100%)    Parameters below as of 30 Nov 2022 20:00  Patient On (Oxygen Delivery Method): ventilator    O2 Concentration (%): 40      Physical Exam:   General: Intubated, multiple lines gtt  Neurology: sedated > on sedation vacation woke up and followed commands  Eyes: bilateral pupils equal and reactive   ENT/Neck: +ETT midline, Neck supple, trachea midline, No JVD   Respiratory: Clear bilaterally   CV: S1S2, no murmurs        [x] Sinus rhythm  Abdominal: Soft, NT, ND +BS   Extremities: 1-2+ pedal edema noted, + peripheral pulses   Skin: No Rashes, Hematoma, Ecchymosis                              Assessment:  61 y/o male with PMH of CABG, DM, HTN, HLD presenting as transfer from [place] for c/f STEMI. PTA arrival received 325 aspirin, 600 plavix, and no heparin drip started. Around 1:30 am patient had multiple episodes of non-bloody diarrhea and emesis with associated abdominal pain and chest pain, unable to localize, non-radiating, with associated SOB and no associated palpitations or diaphoresis. No recent fevers/chills, cough, rashes, or changes in urination. Does report mild diffuse back pain; started 5 days ago in setting on injury while walking and lifting objects. Denies focal weakness, numbness/tingling, or LOC due does report mild dizziness.    Cardiac arrest s/p [facility] 11/25  Hemodynamic instability  Hypovolemia  Post op respiratory insufficiency  Thrombocytopenia  Acute blood loss anemia  Leukocytosis         Plan:   ***Neuro***  CT head showed 4mm subdural hematoma 11/26: repeat CT head unchanged   [x] Sedated with [x] Precedex   Post operative neuro assessment       ***Cardiovascular***  11/30 TTE: EF 30-35%, Severe global LV dysfunction  Invasive hemodynamic monitoring, assess perfusion indices   SR / CVP 9/ MAP 76/ PAP 34/14 (21)/ CI 1.6/ Hct 24.6/ Lactate 0.7  [x] Cardene - 2mg/hr   [x] ECMO- 3300 rpm, 2.75 flow  Reassessment of hemodynamics post resuscitation   [x] Statin  [x] AC Therapy with Argatroban gtt  Serial EKG and cardiac enzymes     ***Pulmonary***  [x]  Nitric oxide- 20 ppm   Post op vent management   Titration of FiO2 and PEEP, follow SpO2, CXR, blood gasses   100% FiO2 on ECMO  Titrate sweep for pco2 and ph      Mode: AC/ CMV (Assist Control/ Continuous Mandatory Ventilation)  RR (machine): 14  FiO2: 50  PEEP: 10  ITime: 1  MAP: 15  PC: 10  PIP: 26              ***GI***  [x] Tolerating TF Vital AF, @ 20cc/hr.----> Increased to 30cc/hr  [x] Protonix    Bowel regimen with Miralax and senna       ***Renal***  Continue to monitor I/Os, BUN/Creatinine.   Replete lytes PRN  De Dios present  Continue diuresis with Bumex goal 2L negative by morning   C/w Free water 300 q4--> added Zaroxolyn       ***ID***  Meropenem for empiric coverage  Follow up cultures        ***Endocrine***  [x]  DM2: HbA1c 7.3%                - [x] Insulin gtt              - Need tight glycemic control to prevent wound infection.          Patient requires continuous monitoring with bedside rhythm monitoring, pulse oximetry monitoring, and continuous central venous and arterial pressure monitoring; and intermittent blood gas analysis. Care plan discussed with the ICU care team.   Patient remained critical, at risk for life threatening decompensation.    I have spent 45 minutes providing critical care management to this patient.    By signing my name below, I, Abhijit Ngo, attest that this documentation has been prepared under the direction and in the presence of Yobani Alaniz NP   Electronically signed: Brian Slaughter, 11-30-22 @ 20:53    JOHNATHAN, Yobani Alaniz NP, personally performed the services described in this documentation. all medical record entries made by the scribe were at my direction and in my presence. I have reviewed the chart and agree that the record reflects my personal performance and is accurate and complete  Electronically signed: Yobani Alaniz NP  Patient seen and examined at the bedside.    Remained critically ill on continuous ICU monitoring.    OBJECTIVE:  Vital Signs Last 24 Hrs  T(C): 37.4 (30 Nov 2022 20:00), Max: 37.4 (30 Nov 2022 20:00)  T(F): 99.3 (30 Nov 2022 20:00), Max: 99.3 (30 Nov 2022 20:00)  HR: 76 (30 Nov 2022 20:45) (60 - 76)  BP: --  BP(mean): --  RR: 25 (30 Nov 2022 20:45) (14 - 33)  SpO2: 99% (30 Nov 2022 20:45) (99% - 100%)    Parameters below as of 30 Nov 2022 20:00  Patient On (Oxygen Delivery Method): ventilator    O2 Concentration (%): 40      Physical Exam:   General: Intubated, multiple lines gtt  Neurology: sedated > on sedation vacation woke up and followed commands  Eyes: bilateral pupils equal and reactive   ENT/Neck: +ETT midline, Neck supple, trachea midline, No JVD   Respiratory: Clear bilaterally   CV: S1S2, no murmurs        [x] Sinus rhythm  Abdominal: Soft, NT, ND +BS   Extremities: 1-2+ pedal edema noted, + peripheral pulses   Skin: No Rashes, Hematoma, Ecchymosis                              Assessment:  61 y/o male with PMH of CABG, DM, HTN, HLD presenting as transfer from [place] for c/f STEMI. PTA arrival received 325 aspirin, 600 plavix, and no heparin drip started. Around 1:30 am patient had multiple episodes of non-bloody diarrhea and emesis with associated abdominal pain and chest pain, unable to localize, non-radiating, with associated SOB and no associated palpitations or diaphoresis. No recent fevers/chills, cough, rashes, or changes in urination. Does report mild diffuse back pain; started 5 days ago in setting on injury while walking and lifting objects. Denies focal weakness, numbness/tingling, or LOC due does report mild dizziness.    Cardiac arrest s/p [facility] ECMO 11/25  Hemodynamic instability  Hypovolemia  Post op respiratory insufficiency  Thrombocytopenia  Acute blood loss anemia  Leukocytosis         Plan:   ***Neuro***  CT head showed 4mm subdural hematoma 11/26: repeat CT head unchanged   [x] Sedated with [x] Precedex   Post operative neuro assessment       ***Cardiovascular***  11/30 TTE: EF 30-35%, Severe global LV dysfunction  Invasive hemodynamic monitoring, assess perfusion indices   SR / CVP 9/ MAP 76/ PAP 34/14 (21)/ CI 1.6/ Hct 24.6/ Lactate 0.7  [x] Cardene - 2mg/hr   [x] ECMO- 3300 rpm, 2.75 flow  Reassessment of hemodynamics post resuscitation   [x] Statin  [x] AC Therapy with Argatroban gtt  Serial EKG and cardiac enzymes     ***Pulmonary***  [x]  Nitric oxide- 20 ppm   Post op vent management   Titration of FiO2 and PEEP, follow SpO2, CXR, blood gasses   100% FiO2 on ECMO  Titrate sweep for pco2 and ph      Mode: AC/ CMV (Assist Control/ Continuous Mandatory Ventilation)  RR (machine): 14  FiO2: 50  PEEP: 10  ITime: 1  MAP: 15  PC: 10  PIP: 26              ***GI***  [x] Tolerating TF Vital AF, @ 20cc/hr.----> Increased to 30cc/hr  [x] Protonix    Bowel regimen with Miralax and senna       ***Renal***  Continue to monitor I/Os, BUN/Creatinine.   Replete lytes PRN  De Dios present  Continue diuresis with Bumex goal 2L negative by morning   C/w Free water 300 q4--> added Zaroxolyn       ***ID***  Meropenem for empiric coverage  Follow up cultures        ***Endocrine***  [x]  DM2: HbA1c 7.3%                - [x] Insulin gtt              - Need tight glycemic control to prevent wound infection.          Patient requires continuous monitoring with bedside rhythm monitoring, pulse oximetry monitoring, and continuous central venous and arterial pressure monitoring; and intermittent blood gas analysis. Care plan discussed with the ICU care team.   Patient remained critical, at risk for life threatening decompensation.    I have spent 45 minutes providing critical care management to this patient.    By signing my name below, I, Abhijit Ngo, attest that this documentation has been prepared under the direction and in the presence of Yobani Alaniz NP   Electronically signed: Brian Slaughter, 11-30-22 @ 20:53    JOHNATHAN, Yobani Alaniz NP, personally performed the services described in this documentation. all medical record entries made by the scribe were at my direction and in my presence. I have reviewed the chart and agree that the record reflects my personal performance and is accurate and complete  Electronically signed: Yobani Alaniz NP  Patient seen and examined at the bedside.    Remained critically ill on continuous ICU monitoring.    OBJECTIVE:  Vital Signs Last 24 Hrs  T(C): 37.4 (30 Nov 2022 20:00), Max: 37.4 (30 Nov 2022 20:00)  T(F): 99.3 (30 Nov 2022 20:00), Max: 99.3 (30 Nov 2022 20:00)  HR: 76 (30 Nov 2022 20:45) (60 - 76)  BP: --  BP(mean): --  RR: 25 (30 Nov 2022 20:45) (14 - 33)  SpO2: 99% (30 Nov 2022 20:45) (99% - 100%)    Parameters below as of 30 Nov 2022 20:00  Patient On (Oxygen Delivery Method): ventilator    O2 Concentration (%): 40      Physical Exam:   General: Intubated, multiple lines gtt  Neurology: sedated > on sedation vacation woke up and followed commands  Eyes: bilateral pupils equal and reactive   ENT/Neck: +ETT midline, Neck supple, trachea midline, No JVD   Respiratory: Clear bilaterally   CV: S1S2, no murmurs        [x] Sinus rhythm  Abdominal: Soft, NT, ND +BS   Extremities: 1-2+ pedal edema noted, + peripheral pulses   Skin: No Rashes, Hematoma, Ecchymosis                              Assessment:  61 y/o male with PMH of CABG, DM, HTN, HLD presenting as transfer from [place] for c/f STEMI. PTA arrival received 325 aspirin, 600 plavix, and no heparin drip started. Around 1:30 am patient had multiple episodes of non-bloody diarrhea and emesis with associated abdominal pain and chest pain, unable to localize, non-radiating, with associated SOB and no associated palpitations or diaphoresis. No recent fevers/chills, cough, rashes, or changes in urination. Does report mild diffuse back pain; started 5 days ago in setting on injury while walking and lifting objects. Denies focal weakness, numbness/tingling, or LOC due does report mild dizziness.    Cardiac arrest s/p [facility] ECMO 11/25  Hemodynamic instability  Hypovolemia  Post op respiratory insufficiency  Thrombocytopenia  Acute blood loss anemia  Leukocytosis         Plan:   ***Neuro***  CT head showed 4mm subdural hematoma 11/26: repeat CT head unchanged   [x] Sedated with [x] Precedex   Post operative neuro assessment       ***Cardiovascular***  11/30 TTE: EF 30-35%, Severe global LV dysfunction  Invasive hemodynamic monitoring, assess perfusion indices   SR / CVP 9/ MAP 76/ PAP 34/14 (21)/ CI 1.6/ Hct 24.6/ Lactate 0.7  [x] Cardene - 2mg/hr   [x] ECMO- 3300 rpm, 2.75 flow  Reassessment of hemodynamics post resuscitation   [x] Statin  [x] AC Therapy with Argatroban gtt  Serial EKG and cardiac enzymes     ***Pulmonary***  [x]  Nitric oxide- 20 ppm   Post op vent management   Titration of FiO2 and PEEP, follow SpO2, CXR, blood gasses   100% FiO2 on ECMO  Titrate sweep for pco2 and ph      Mode: AC/ CMV (Assist Control/ Continuous Mandatory Ventilation)  RR (machine): 14  FiO2: 50  PEEP: 10  ITime: 1  MAP: 15  PC: 10  PIP: 26              ***GI***  [x] Tolerating TF Vital AF, @ 20cc/hr.----> Increased to 30cc/hr  [x] Protonix    Bowel regimen with Miralax and senna       ***Renal***  Continue to monitor I/Os, BUN/Creatinine.   Replete lytes PRN  De Dios present  Continue diuresis with Bumex goal 2L negative by morning   C/w Free water 300 q4--> added Zaroxolyn       ***ID***  Meropenem for empiric coverage  Follow up cultures        ***Endocrine***  [x]  DM2: HbA1c 7.3%                - [x] Insulin gtt              - Need tight glycemic control to prevent wound infection.          Patient requires continuous monitoring with bedside rhythm monitoring, pulse oximetry monitoring, and continuous central venous and arterial pressure monitoring; and intermittent blood gas analysis. Care plan discussed with the ICU care team.   Patient remained critical, at risk for life threatening decompensation.    I have spent 45 minutes providing critical care management to this patient.    By signing my name below, I, Abhijit Ngo, attest that this documentation has been prepared under the direction and in the presence of Yobani Alaniz NP   Electronically signed: Brian Slaughter, 11-30-22 @ 20:53    JOHNATHAN, Yobani Alaniz NP, personally performed the services described in this documentation. all medical record entries made by the scribe were at my direction and in my presence. I have reviewed the chart and agree that the record reflects my personal performance and is accurate and complete  Electronically signed: Yobani Alaniz NP  Patient seen and examined at the bedside.    Remained critically ill on continuous ICU monitoring.    OBJECTIVE:  Vital Signs Last 24 Hrs  T(C): 37.4 (30 Nov 2022 20:00), Max: 37.4 (30 Nov 2022 20:00)  T(F): 99.3 (30 Nov 2022 20:00), Max: 99.3 (30 Nov 2022 20:00)  HR: 76 (30 Nov 2022 20:45) (60 - 76)  RR: 25 (30 Nov 2022 20:45) (14 - 33)  SpO2: 99% (30 Nov 2022 20:45) (99% - 100%)    Parameters below as of 30 Nov 2022 20:00  Patient On (Oxygen Delivery Method): ventilator    O2 Concentration (%): 40      Physical Exam:   General: Intubated, multiple lines gtt  Neurology: sedated > on sedation vacation woke up and followed commands  Eyes: bilateral pupils equal and reactive   ENT/Neck: +ETT midline, Neck supple, trachea midline, No JVD   Respiratory: rhonchi bilaterally   CV: S1S2, no murmurs        [x] Sinus rhythm  Abdominal: Soft, NT, ND +BS   Extremities: 1-2+ pedal edema noted, + peripheral pulses   Skin: No Rashes, Hematoma, Ecchymosis                              Assessment:  61 y/o male with PMH of CABG, DM, HTN, HLD presenting as transfer from [place] for c/f STEMI. PTA arrival received 325 aspirin, 600 plavix, and no heparin drip started. Around 1:30 am patient had multiple episodes of non-bloody diarrhea and emesis with associated abdominal pain and chest pain, unable to localize, non-radiating, with associated SOB and no associated palpitations or diaphoresis. No recent fevers/chills, cough, rashes, or changes in urination. Does report mild diffuse back pain; started 5 days ago in setting on injury while walking and lifting objects. Denies focal weakness, numbness/tingling, or LOC due does report mild dizziness.    Cardiac arrest s/p [facility] ECMO 11/25  Hemodynamic instability  Hypovolemia  Post op respiratory insufficiency  Thrombocytopenia  Acute blood loss anemia  Leukocytosis         Plan:   ***Neuro***  CT head showed 4mm subdural hematoma 11/26: repeat CT head unchanged   [x] Sedated with [x] Precedex   Post operative neuro assessment       ***Cardiovascular***  11/30 TTE: EF 30-35%, Severe global LV dysfunction  Invasive hemodynamic monitoring, assess perfusion indices   SR / CVP 9/ MAP 76/ PAP 34/14 (21)/ CI 1.6/ Hct 24.6/ Lactate 0.7  [x] Cardene  currently off   [x] ECMO- 3300 rpm, 2.75 flow  Reassessment of hemodynamics post resuscitation   [x] Statin  [x] AC Therapy with Argatroban gtt  Stress dose steroids decreased to BID today, will continue to wean      ***Pulmonary***  [x]  Nitric oxide- 20 ppm   Post op vent management   Titration of FiO2 and PEEP, follow SpO2, CXR, blood gasses   100% FiO2 on ECMO      Mode: AC/ CMV (Assist Control/ Continuous Mandatory Ventilation)  RR (machine): 14  FiO2: 50  PEEP: 10  ITime: 1  MAP: 15  PC: 10  PIP: 26              ***GI***  [x] Tolerating TF Vital AF, @ 20cc/hr.----> Increased to 30cc/hr  [x] Protonix    Bowel regimen with Miralax and senna       ***Renal***  Continue to monitor I/Os, BUN/Creatinine.   Replete lytes PRN  De Dios present  Continue diuresis with Bumex goal 2L negative by morning   C/w Free water 300 q4  Zaroxolyn added for hypernatremia        ***ID***  Meropenem for empiric coverage  Follow up cultures        ***Endocrine***  [x]  DM2: HbA1c 7.3%                - [x] Insulin gtt              - Need tight glycemic control to prevent wound infection.          Patient requires continuous monitoring with bedside rhythm monitoring, pulse oximetry monitoring, and continuous central venous and arterial pressure monitoring; and intermittent blood gas analysis. Care plan discussed with the ICU care team.   Patient remained critical, at risk for life threatening decompensation.    I have spent 45 minutes providing critical care management to this patient.    By signing my name below, I, Abhijit Ngo, attest that this documentation has been prepared under the direction and in the presence of Yobani Alaniz NP   Electronically signed: Brian Slaughter, 11-30-22 @ 20:53    I, Yobani Alaniz NP, personally performed the services described in this documentation. all medical record entries made by the scribe were at my direction and in my presence. I have reviewed the chart and agree that the record reflects my personal performance and is accurate and complete  Electronically signed: Yobani Alaniz NP  Patient seen and examined at the bedside.    Remained critically ill on continuous ICU monitoring.    OBJECTIVE:  Vital Signs Last 24 Hrs  T(C): 37.4 (30 Nov 2022 20:00), Max: 37.4 (30 Nov 2022 20:00)  T(F): 99.3 (30 Nov 2022 20:00), Max: 99.3 (30 Nov 2022 20:00)  HR: 76 (30 Nov 2022 20:45) (60 - 76)  RR: 25 (30 Nov 2022 20:45) (14 - 33)  SpO2: 99% (30 Nov 2022 20:45) (99% - 100%)    Parameters below as of 30 Nov 2022 20:00  Patient On (Oxygen Delivery Method): ventilator    O2 Concentration (%): 40      Physical Exam:   General: Intubated, multiple lines gtt  Neurology: sedated > on sedation vacation woke up and followed commands  Eyes: bilateral pupils equal and reactive   ENT/Neck: +ETT midline, Neck supple, trachea midline, No JVD   Respiratory: rhonchi bilaterally   CV: S1S2, no murmurs        [x] Sinus rhythm  Abdominal: Soft, NT, ND +BS   Extremities: 1-2+ pedal edema noted, + peripheral pulses   Skin: No Rashes, Hematoma, Ecchymosis                              Assessment:  63 y/o male with PMH of CABG, DM, HTN, HLD presenting as transfer from [place] for c/f STEMI. PTA arrival received 325 aspirin, 600 plavix, and no heparin drip started. Around 1:30 am patient had multiple episodes of non-bloody diarrhea and emesis with associated abdominal pain and chest pain, unable to localize, non-radiating, with associated SOB and no associated palpitations or diaphoresis. No recent fevers/chills, cough, rashes, or changes in urination. Does report mild diffuse back pain; started 5 days ago in setting on injury while walking and lifting objects. Denies focal weakness, numbness/tingling, or LOC due does report mild dizziness.    Cardiac arrest s/p [facility] ECMO 11/25  Hemodynamic instability  Hypovolemia  Post op respiratory insufficiency  Thrombocytopenia  Acute blood loss anemia  Leukocytosis         Plan:   ***Neuro***  CT head showed 4mm subdural hematoma 11/26: repeat CT head unchanged   [x] Sedated with [x] Precedex   Post operative neuro assessment       ***Cardiovascular***  11/30 TTE: EF 30-35%, Severe global LV dysfunction  Invasive hemodynamic monitoring, assess perfusion indices   SR / CVP 9/ MAP 76/ PAP 34/14 (21)/ CI 1.6/ Hct 24.6/ Lactate 0.7  [x] Cardene  currently off   [x] ECMO- 3300 rpm, 2.75 flow  Reassessment of hemodynamics post resuscitation   [x] Statin  [x] AC Therapy with Argatroban gtt  Stress dose steroids decreased to BID today, will continue to wean      ***Pulmonary***  [x]  Nitric oxide- 20 ppm   Post op vent management   Titration of FiO2 and PEEP, follow SpO2, CXR, blood gasses   100% FiO2 on ECMO      Mode: AC/ CMV (Assist Control/ Continuous Mandatory Ventilation)  RR (machine): 14  FiO2: 50  PEEP: 10  ITime: 1  MAP: 15  PC: 10  PIP: 26              ***GI***  [x] Tolerating TF Vital AF, @ 20cc/hr.----> Increased to 30cc/hr  [x] Protonix    Bowel regimen with Miralax and senna       ***Renal***  Continue to monitor I/Os, BUN/Creatinine.   Replete lytes PRN  De Dios present  Continue diuresis with Bumex goal 2L negative by morning   C/w Free water 300 q4  Zaroxolyn added for hypernatremia        ***ID***  Meropenem for empiric coverage  Follow up cultures        ***Endocrine***  [x]  DM2: HbA1c 7.3%                - [x] Insulin gtt              - Need tight glycemic control to prevent wound infection.          Patient requires continuous monitoring with bedside rhythm monitoring, pulse oximetry monitoring, and continuous central venous and arterial pressure monitoring; and intermittent blood gas analysis. Care plan discussed with the ICU care team.   Patient remained critical, at risk for life threatening decompensation.    I have spent 45 minutes providing critical care management to this patient.    By signing my name below, I, Abhijit Ngo, attest that this documentation has been prepared under the direction and in the presence of Yobani Alaniz NP   Electronically signed: Brian Slaughter, 11-30-22 @ 20:53    I, Yobani Alaniz NP, personally performed the services described in this documentation. all medical record entries made by the scribe were at my direction and in my presence. I have reviewed the chart and agree that the record reflects my personal performance and is accurate and complete  Electronically signed: Yobani Alaniz NP  Patient seen and examined at the bedside.    Remained critically ill on continuous ICU monitoring.    OBJECTIVE:  Vital Signs Last 24 Hrs  T(C): 37.4 (30 Nov 2022 20:00), Max: 37.4 (30 Nov 2022 20:00)  T(F): 99.3 (30 Nov 2022 20:00), Max: 99.3 (30 Nov 2022 20:00)  HR: 76 (30 Nov 2022 20:45) (60 - 76)  RR: 25 (30 Nov 2022 20:45) (14 - 33)  SpO2: 99% (30 Nov 2022 20:45) (99% - 100%)    Parameters below as of 30 Nov 2022 20:00  Patient On (Oxygen Delivery Method): ventilator    O2 Concentration (%): 40      Physical Exam:   General: Intubated, multiple lines gtt  Neurology: sedated > on sedation vacation woke up and followed commands  Eyes: bilateral pupils equal and reactive   ENT/Neck: +ETT midline, Neck supple, trachea midline, No JVD   Respiratory: rhonchi bilaterally   CV: S1S2, no murmurs        [x] Sinus rhythm  Abdominal: Soft, NT, ND +BS   Extremities: 1-2+ pedal edema noted, + peripheral pulses   Skin: No Rashes, Hematoma, Ecchymosis                              Assessment:  61 y/o male with PMH of CABG, DM, HTN, HLD presenting as transfer from [place] for c/f STEMI. PTA arrival received 325 aspirin, 600 plavix, and no heparin drip started. Around 1:30 am patient had multiple episodes of non-bloody diarrhea and emesis with associated abdominal pain and chest pain, unable to localize, non-radiating, with associated SOB and no associated palpitations or diaphoresis. No recent fevers/chills, cough, rashes, or changes in urination. Does report mild diffuse back pain; started 5 days ago in setting on injury while walking and lifting objects. Denies focal weakness, numbness/tingling, or LOC due does report mild dizziness.    Cardiac arrest s/p [facility] ECMO 11/25  Hemodynamic instability  Hypovolemia  Post op respiratory insufficiency  Thrombocytopenia  Acute blood loss anemia  Leukocytosis         Plan:   ***Neuro***  CT head showed 4mm subdural hematoma 11/26: repeat CT head unchanged   [x] Sedated with [x] Precedex   Post operative neuro assessment       ***Cardiovascular***  11/30 TTE: EF 30-35%, Severe global LV dysfunction  Invasive hemodynamic monitoring, assess perfusion indices   SR / CVP 9/ MAP 76/ PAP 34/14 (21)/ CI 1.6/ Hct 24.6/ Lactate 0.7  [x] Cardene  currently off   [x] ECMO- 3300 rpm, 2.75 flow  Reassessment of hemodynamics post resuscitation   [x] Statin  [x] AC Therapy with Argatroban gtt  Stress dose steroids decreased to BID today, will continue to wean      ***Pulmonary***  [x]  Nitric oxide- 20 ppm   Post op vent management   Titration of FiO2 and PEEP, follow SpO2, CXR, blood gasses   100% FiO2 on ECMO      Mode: AC/ CMV (Assist Control/ Continuous Mandatory Ventilation)  RR (machine): 14  FiO2: 50  PEEP: 10  ITime: 1  MAP: 15  PC: 10  PIP: 26              ***GI***  [x] Tolerating TF Vital AF, @ 20cc/hr.----> Increased to 30cc/hr  [x] Protonix    Bowel regimen with Miralax and senna       ***Renal***  Continue to monitor I/Os, BUN/Creatinine.   Replete lytes PRN  De Dios present  Continue diuresis with Bumex goal 2L negative by morning   C/w Free water 300 q4  Zaroxolyn added for hypernatremia        ***ID***  Meropenem for empiric coverage  Follow up cultures        ***Endocrine***  [x]  DM2: HbA1c 7.3%                - [x] Insulin gtt              - Need tight glycemic control to prevent wound infection.          Patient requires continuous monitoring with bedside rhythm monitoring, pulse oximetry monitoring, and continuous central venous and arterial pressure monitoring; and intermittent blood gas analysis. Care plan discussed with the ICU care team.   Patient remained critical, at risk for life threatening decompensation.    I have spent 45 minutes providing critical care management to this patient.    By signing my name below, I, Abhijit Ngo, attest that this documentation has been prepared under the direction and in the presence of Yobani Alaniz NP   Electronically signed: Brian Slaughter, 11-30-22 @ 20:53    I, Yobani Alaniz NP, personally performed the services described in this documentation. all medical record entries made by the scribe were at my direction and in my presence. I have reviewed the chart and agree that the record reflects my personal performance and is accurate and complete  Electronically signed: Yobani Alaniz NP

## 2022-11-30 NOTE — PROGRESS NOTE ADULT - PROBLEM SELECTOR PLAN 4
- CT abd showing acute pancreatitis  - lipase > 3000 11/24, normalized to 40 and now uptrended with resuming NGT feeding  - conservative care  - appreciate GI recs, possible ERCP once stable.  - on emperic meropenem.

## 2022-11-30 NOTE — PROGRESS NOTE ADULT - SUBJECTIVE AND OBJECTIVE BOX
TRAUMA SURGERY PROGRESS NOTE    Patient: EDUARDO REAL , 62y ([birth year])Male   MRN: [id number]  Location: [place] [id number]  Visit: 11-24-22 Inpatient  Date: 11-30-22 @ 12:52          PAST MEDICAL & SURGICAL HISTORY:      PHYSICAL EXAM:  General: NAD  Cardiac: On ECMO  Respiratory: Intubated  Abdomen: Soft, non-distended, non-tender, no rebound, no guarding.  Ext:  Moving b/l upper and lower extremities. Palpable Radial b/l UE, b/l DP palpable in LE.     MEDICATIONS  (STANDING):  albumin human 25% IVPB 50 milliLiter(s) IV Intermittent every 6 hours  argatroban Infusion 0.2 MICROgram(s)/kG/Min (1.12 mL/Hr) IV Continuous <Continuous>  artificial  tears Solution 1 Drop(s) Both EYES four times a day  atorvastatin 40 milliGRAM(s) Oral at bedtime  buMETAnide Injectable 2 milliGRAM(s) IV Push every 12 hours  chlorhexidine 0.12% Liquid 15 milliLiter(s) Oral Mucosa every 12 hours  chlorhexidine 2% Cloths 1 Application(s) Topical <User Schedule>  dexMEDEtomidine Infusion 1.5 MICROgram(s)/kG/Hr (34.8 mL/Hr) IV Continuous <Continuous>  dextrose 50% Injectable 50 milliLiter(s) IV Push every 15 minutes  hydrocortisone sodium succinate Injectable 25 milliGRAM(s) IV Push every 12 hours  insulin regular Infusion 6 Unit(s)/Hr (6 mL/Hr) IV Continuous <Continuous>  meropenem  IVPB 500 milliGRAM(s) IV Intermittent every 12 hours  niCARdipine Infusion 2 mG/Hr (10 mL/Hr) IV Continuous <Continuous>  pantoprazole  Injectable 40 milliGRAM(s) IV Push two times a day  polyethylene glycol 3350 17 Gram(s) Oral daily  senna 2 Tablet(s) Oral at bedtime  sodium chloride 0.9%. 1000 milliLiter(s) (5 mL/Hr) IV Continuous <Continuous>    MEDICATIONS  (PRN):  sodium chloride 0.9% lock flush 10 milliLiter(s) IV Push every 1 hour PRN Pre/post blood products, medications, blood draw, and to maintain line patency      DVT PROPHYLAXIS: SCDs,   GI PROPHYLAXIS: pantoprazole  Injectable 40 milliGRAM(s) IV Push two times a day    ANTICOAGULATION:   ANTIBIOTICS: meropenem  IVPB 500 milliGRAM(s)            LAB/STUDIES:  Labs:  CAPILLARY BLOOD GLUCOSE  133 (30 Nov 2022 12:00)  140 (30 Nov 2022 11:00)  139 (30 Nov 2022 10:00)  147 (30 Nov 2022 09:00)  137 (30 Nov 2022 08:00)  136 (30 Nov 2022 07:00)  143 (30 Nov 2022 06:00)  128 (30 Nov 2022 05:00)  141 (30 Nov 2022 04:00)  126 (30 Nov 2022 03:00)  132 (30 Nov 2022 02:00)  117 (30 Nov 2022 01:00)  147 (30 Nov 2022 00:00)  130 (29 Nov 2022 23:00)  130 (29 Nov 2022 22:00)  137 (29 Nov 2022 21:00)  134 (29 Nov 2022 20:00)  142 (29 Nov 2022 16:00)  161 (29 Nov 2022 15:00)      POCT Blood Glucose.: 133 mg/dL (30 Nov 2022 11:52)  POCT Blood Glucose.: 140 mg/dL (30 Nov 2022 11:07)  POCT Blood Glucose.: 139 mg/dL (30 Nov 2022 10:21)  POCT Blood Glucose.: 147 mg/dL (30 Nov 2022 08:48)  POCT Blood Glucose.: 137 mg/dL (30 Nov 2022 08:16)  POCT Blood Glucose.: 136 mg/dL (30 Nov 2022 06:40)  POCT Blood Glucose.: 143 mg/dL (30 Nov 2022 05:46)  POCT Blood Glucose.: 128 mg/dL (30 Nov 2022 04:48)  POCT Blood Glucose.: 141 mg/dL (30 Nov 2022 03:47)  POCT Blood Glucose.: 126 mg/dL (30 Nov 2022 02:46)  POCT Blood Glucose.: 132 mg/dL (30 Nov 2022 02:03)  POCT Blood Glucose.: 117 mg/dL (30 Nov 2022 00:50)  POCT Blood Glucose.: 142 mg/dL (29 Nov 2022 23:58)  POCT Blood Glucose.: 130 mg/dL (29 Nov 2022 22:47)  POCT Blood Glucose.: 130 mg/dL (29 Nov 2022 21:48)  POCT Blood Glucose.: 134 mg/dL (29 Nov 2022 20:50)  POCT Blood Glucose.: 134 mg/dL (29 Nov 2022 19:51)  POCT Blood Glucose.: 127 mg/dL (29 Nov 2022 18:53)  POCT Blood Glucose.: 143 mg/dL (29 Nov 2022 17:58)  POCT Blood Glucose.: 144 mg/dL (29 Nov 2022 16:51)  POCT Blood Glucose.: 142 mg/dL (29 Nov 2022 15:57)  POCT Blood Glucose.: 143 mg/dL (29 Nov 2022 14:55)  POCT Blood Glucose.: 150 mg/dL (29 Nov 2022 13:45)                          8.0    13.10 )-----------( 93       ( 30 Nov 2022 00:43 )             24.6         11-30    155<H>  |  123<H>  |  67<H>  ----------------------------<  139<H>  4.1   |  19<L>  |  3.10<H>      Calcium, Total Serum: 8.0 mg/dL (11-30-22 @ 00:43)      LFTs:             5.9  | 0.8  | 42       ------------------[70      ( 30 Nov 2022 00:43 )  3.2  | x    | 7           Lipase:x      Amylase:x         Blood Gas Arterial, Lactate: 0.9 mmol/L (11-30-22 @ 07:14)  Blood Gas Arterial, Lactate: 0.8 mmol/L (11-30-22 @ 00:00)  Blood Gas Arterial, Lactate: 0.8 mmol/L (11-29-22 @ 17:29)  Blood Gas Arterial, Lactate: 0.7 mmol/L (11-29-22 @ 13:49)  Blood Gas Arterial, Lactate: 0.7 mmol/L (11-29-22 @ 11:07)  Blood Gas Arterial, Lactate: 0.9 mmol/L (11-29-22 @ 10:42)  Blood Gas Arterial, Lactate: 0.8 mmol/L (11-29-22 @ 10:42)  Blood Gas Arterial, Lactate: 0.8 mmol/L (11-29-22 @ 06:52)  Blood Gas Arterial, Lactate: 0.8 mmol/L (11-29-22 @ 06:16)  Blood Gas Arterial, Lactate: 0.6 mmol/L (11-29-22 @ 06:10)  Blood Gas Arterial, Lactate: 0.8 mmol/L (11-29-22 @ 06:03)  Blood Gas Arterial, Lactate: 1.0 mmol/L (11-29-22 @ 02:50)  Blood Gas Arterial, Lactate: 0.9 mmol/L (11-29-22 @ 00:00)  Blood Gas Arterial, Lactate: 0.9 mmol/L (11-28-22 @ 19:56)  Blood Gas Arterial, Lactate: 1.0 mmol/L (11-28-22 @ 15:06)  Blood Gas Arterial, Lactate: 1.1 mmol/L (11-28-22 @ 10:16)  Blood Gas Arterial, Lactate: 1.2 mmol/L (11-28-22 @ 08:55)  Blood Gas Arterial, Lactate: 1.3 mmol/L (11-28-22 @ 06:15)  Blood Gas Arterial, Lactate: 1.2 mmol/L (11-28-22 @ 00:40)  Blood Gas Venous - Lactate: 1.2 mmol/L (11-28-22 @ 00:40)  Blood Gas Arterial, Lactate: 1.1 mmol/L (11-27-22 @ 20:00)    ABG - ( 30 Nov 2022 07:14 )  pH: 7.40  /  pCO2: 30    /  pO2: 493   / HCO3: 19    / Base Excess: -5.5  /  SaO2: 96.8            ABG - ( 30 Nov 2022 00:00 )  pH: 7.39  /  pCO2: 33    /  pO2: 118   / HCO3: 20    / Base Excess: -4.4  /  SaO2: 97.8            ABG - ( 29 Nov 2022 17:29 )  pH: 7.37  /  pCO2: 33    /  pO2: 122   / HCO3: 19    / Base Excess: -5.6  /  SaO2: 97.9              Coags:     16.1   ----< 1.38    ( 30 Nov 2022 07:05 )     50.4        CARDIAC MARKERS ( 29 Nov 2022 00:26 )  x     / x     / 162 U/L / x     / 4.3 ng/mL      Serum Pro-Brain Natriuretic Peptide: 330 pg/mL (11-24-22 @ 07:57)          Culture - Blood (collected 28 Nov 2022 15:24)  Source: .Blood Blood-Peripheral  Preliminary Report (29 Nov 2022 23:01):    No growth to date.         TRAUMA SURGERY PROGRESS NOTE    Patient: EDUARDO REAL , 62y ([birth year])Male   MRN: [id number]  Location: [place] [address]  Visit: 11-24-22 Inpatient  Date: 11-30-22 @ 12:52          PAST MEDICAL & SURGICAL HISTORY:      PHYSICAL EXAM:  General: NAD  Cardiac: On ECMO  Respiratory: Intubated  Abdomen: Soft, non-distended, non-tender, no rebound, no guarding.  Ext:  Moving b/l upper and lower extremities. Palpable Radial b/l UE, b/l DP palpable in LE.     MEDICATIONS  (STANDING):  albumin human 25% IVPB 50 milliLiter(s) IV Intermittent every 6 hours  argatroban Infusion 0.2 MICROgram(s)/kG/Min (1.12 mL/Hr) IV Continuous <Continuous>  artificial  tears Solution 1 Drop(s) Both EYES four times a day  atorvastatin 40 milliGRAM(s) Oral at bedtime  buMETAnide Injectable 2 milliGRAM(s) IV Push every 12 hours  chlorhexidine 0.12% Liquid 15 milliLiter(s) Oral Mucosa every 12 hours  chlorhexidine 2% Cloths 1 Application(s) Topical <User Schedule>  dexMEDEtomidine Infusion 1.5 MICROgram(s)/kG/Hr (34.8 mL/Hr) IV Continuous <Continuous>  dextrose 50% Injectable 50 milliLiter(s) IV Push every 15 minutes  hydrocortisone sodium succinate Injectable 25 milliGRAM(s) IV Push every 12 hours  insulin regular Infusion 6 Unit(s)/Hr (6 mL/Hr) IV Continuous <Continuous>  meropenem  IVPB 500 milliGRAM(s) IV Intermittent every 12 hours  niCARdipine Infusion 2 mG/Hr (10 mL/Hr) IV Continuous <Continuous>  pantoprazole  Injectable 40 milliGRAM(s) IV Push two times a day  polyethylene glycol 3350 17 Gram(s) Oral daily  senna 2 Tablet(s) Oral at bedtime  sodium chloride 0.9%. 1000 milliLiter(s) (5 mL/Hr) IV Continuous <Continuous>    MEDICATIONS  (PRN):  sodium chloride 0.9% lock flush 10 milliLiter(s) IV Push every 1 hour PRN Pre/post blood products, medications, blood draw, and to maintain line patency      DVT PROPHYLAXIS: SCDs,   GI PROPHYLAXIS: pantoprazole  Injectable 40 milliGRAM(s) IV Push two times a day    ANTICOAGULATION:   ANTIBIOTICS: meropenem  IVPB 500 milliGRAM(s)            LAB/STUDIES:  Labs:  CAPILLARY BLOOD GLUCOSE  133 (30 Nov 2022 12:00)  140 (30 Nov 2022 11:00)  139 (30 Nov 2022 10:00)  147 (30 Nov 2022 09:00)  137 (30 Nov 2022 08:00)  136 (30 Nov 2022 07:00)  143 (30 Nov 2022 06:00)  128 (30 Nov 2022 05:00)  141 (30 Nov 2022 04:00)  126 (30 Nov 2022 03:00)  132 (30 Nov 2022 02:00)  117 (30 Nov 2022 01:00)  147 (30 Nov 2022 00:00)  130 (29 Nov 2022 23:00)  130 (29 Nov 2022 22:00)  137 (29 Nov 2022 21:00)  134 (29 Nov 2022 20:00)  142 (29 Nov 2022 16:00)  161 (29 Nov 2022 15:00)      POCT Blood Glucose.: 133 mg/dL (30 Nov 2022 11:52)  POCT Blood Glucose.: 140 mg/dL (30 Nov 2022 11:07)  POCT Blood Glucose.: 139 mg/dL (30 Nov 2022 10:21)  POCT Blood Glucose.: 147 mg/dL (30 Nov 2022 08:48)  POCT Blood Glucose.: 137 mg/dL (30 Nov 2022 08:16)  POCT Blood Glucose.: 136 mg/dL (30 Nov 2022 06:40)  POCT Blood Glucose.: 143 mg/dL (30 Nov 2022 05:46)  POCT Blood Glucose.: 128 mg/dL (30 Nov 2022 04:48)  POCT Blood Glucose.: 141 mg/dL (30 Nov 2022 03:47)  POCT Blood Glucose.: 126 mg/dL (30 Nov 2022 02:46)  POCT Blood Glucose.: 132 mg/dL (30 Nov 2022 02:03)  POCT Blood Glucose.: 117 mg/dL (30 Nov 2022 00:50)  POCT Blood Glucose.: 142 mg/dL (29 Nov 2022 23:58)  POCT Blood Glucose.: 130 mg/dL (29 Nov 2022 22:47)  POCT Blood Glucose.: 130 mg/dL (29 Nov 2022 21:48)  POCT Blood Glucose.: 134 mg/dL (29 Nov 2022 20:50)  POCT Blood Glucose.: 134 mg/dL (29 Nov 2022 19:51)  POCT Blood Glucose.: 127 mg/dL (29 Nov 2022 18:53)  POCT Blood Glucose.: 143 mg/dL (29 Nov 2022 17:58)  POCT Blood Glucose.: 144 mg/dL (29 Nov 2022 16:51)  POCT Blood Glucose.: 142 mg/dL (29 Nov 2022 15:57)  POCT Blood Glucose.: 143 mg/dL (29 Nov 2022 14:55)  POCT Blood Glucose.: 150 mg/dL (29 Nov 2022 13:45)                          8.0    13.10 )-----------( 93       ( 30 Nov 2022 00:43 )             24.6         11-30    155<H>  |  123<H>  |  67<H>  ----------------------------<  139<H>  4.1   |  19<L>  |  3.10<H>      Calcium, Total Serum: 8.0 mg/dL (11-30-22 @ 00:43)      LFTs:             5.9  | 0.8  | 42       ------------------[70      ( 30 Nov 2022 00:43 )  3.2  | x    | 7           Lipase:x      Amylase:x         Blood Gas Arterial, Lactate: 0.9 mmol/L (11-30-22 @ 07:14)  Blood Gas Arterial, Lactate: 0.8 mmol/L (11-30-22 @ 00:00)  Blood Gas Arterial, Lactate: 0.8 mmol/L (11-29-22 @ 17:29)  Blood Gas Arterial, Lactate: 0.7 mmol/L (11-29-22 @ 13:49)  Blood Gas Arterial, Lactate: 0.7 mmol/L (11-29-22 @ 11:07)  Blood Gas Arterial, Lactate: 0.9 mmol/L (11-29-22 @ 10:42)  Blood Gas Arterial, Lactate: 0.8 mmol/L (11-29-22 @ 10:42)  Blood Gas Arterial, Lactate: 0.8 mmol/L (11-29-22 @ 06:52)  Blood Gas Arterial, Lactate: 0.8 mmol/L (11-29-22 @ 06:16)  Blood Gas Arterial, Lactate: 0.6 mmol/L (11-29-22 @ 06:10)  Blood Gas Arterial, Lactate: 0.8 mmol/L (11-29-22 @ 06:03)  Blood Gas Arterial, Lactate: 1.0 mmol/L (11-29-22 @ 02:50)  Blood Gas Arterial, Lactate: 0.9 mmol/L (11-29-22 @ 00:00)  Blood Gas Arterial, Lactate: 0.9 mmol/L (11-28-22 @ 19:56)  Blood Gas Arterial, Lactate: 1.0 mmol/L (11-28-22 @ 15:06)  Blood Gas Arterial, Lactate: 1.1 mmol/L (11-28-22 @ 10:16)  Blood Gas Arterial, Lactate: 1.2 mmol/L (11-28-22 @ 08:55)  Blood Gas Arterial, Lactate: 1.3 mmol/L (11-28-22 @ 06:15)  Blood Gas Arterial, Lactate: 1.2 mmol/L (11-28-22 @ 00:40)  Blood Gas Venous - Lactate: 1.2 mmol/L (11-28-22 @ 00:40)  Blood Gas Arterial, Lactate: 1.1 mmol/L (11-27-22 @ 20:00)    ABG - ( 30 Nov 2022 07:14 )  pH: 7.40  /  pCO2: 30    /  pO2: 493   / HCO3: 19    / Base Excess: -5.5  /  SaO2: 96.8            ABG - ( 30 Nov 2022 00:00 )  pH: 7.39  /  pCO2: 33    /  pO2: 118   / HCO3: 20    / Base Excess: -4.4  /  SaO2: 97.8            ABG - ( 29 Nov 2022 17:29 )  pH: 7.37  /  pCO2: 33    /  pO2: 122   / HCO3: 19    / Base Excess: -5.6  /  SaO2: 97.9              Coags:     16.1   ----< 1.38    ( 30 Nov 2022 07:05 )     50.4        CARDIAC MARKERS ( 29 Nov 2022 00:26 )  x     / x     / 162 U/L / x     / 4.3 ng/mL      Serum Pro-Brain Natriuretic Peptide: 330 pg/mL (11-24-22 @ 07:57)          Culture - Blood (collected 28 Nov 2022 15:24)  Source: .Blood Blood-Peripheral  Preliminary Report (29 Nov 2022 23:01):    No growth to date.         TRAUMA SURGERY PROGRESS NOTE    Patient: EDUARDO REAL , 62y ([birth year])Male   MRN: [id number]  Location: [place] [id number]  Visit: 11-24-22 Inpatient  Date: 11-30-22 @ 12:52          PAST MEDICAL & SURGICAL HISTORY:      PHYSICAL EXAM:  General: NAD  Cardiac: On ECMO  Respiratory: Intubated  Abdomen: Soft, non-distended, non-tender, no rebound, no guarding.  Ext:  Moving b/l upper and lower extremities. Palpable Radial b/l UE, b/l DP palpable in LE.     MEDICATIONS  (STANDING):  albumin human 25% IVPB 50 milliLiter(s) IV Intermittent every 6 hours  argatroban Infusion 0.2 MICROgram(s)/kG/Min (1.12 mL/Hr) IV Continuous <Continuous>  artificial  tears Solution 1 Drop(s) Both EYES four times a day  atorvastatin 40 milliGRAM(s) Oral at bedtime  buMETAnide Injectable 2 milliGRAM(s) IV Push every 12 hours  chlorhexidine 0.12% Liquid 15 milliLiter(s) Oral Mucosa every 12 hours  chlorhexidine 2% Cloths 1 Application(s) Topical <User Schedule>  dexMEDEtomidine Infusion 1.5 MICROgram(s)/kG/Hr (34.8 mL/Hr) IV Continuous <Continuous>  dextrose 50% Injectable 50 milliLiter(s) IV Push every 15 minutes  hydrocortisone sodium succinate Injectable 25 milliGRAM(s) IV Push every 12 hours  insulin regular Infusion 6 Unit(s)/Hr (6 mL/Hr) IV Continuous <Continuous>  meropenem  IVPB 500 milliGRAM(s) IV Intermittent every 12 hours  niCARdipine Infusion 2 mG/Hr (10 mL/Hr) IV Continuous <Continuous>  pantoprazole  Injectable 40 milliGRAM(s) IV Push two times a day  polyethylene glycol 3350 17 Gram(s) Oral daily  senna 2 Tablet(s) Oral at bedtime  sodium chloride 0.9%. 1000 milliLiter(s) (5 mL/Hr) IV Continuous <Continuous>    MEDICATIONS  (PRN):  sodium chloride 0.9% lock flush 10 milliLiter(s) IV Push every 1 hour PRN Pre/post blood products, medications, blood draw, and to maintain line patency      DVT PROPHYLAXIS: SCDs,   GI PROPHYLAXIS: pantoprazole  Injectable 40 milliGRAM(s) IV Push two times a day    ANTICOAGULATION:   ANTIBIOTICS: meropenem  IVPB 500 milliGRAM(s)            LAB/STUDIES:  Labs:  CAPILLARY BLOOD GLUCOSE  133 (30 Nov 2022 12:00)  140 (30 Nov 2022 11:00)  139 (30 Nov 2022 10:00)  147 (30 Nov 2022 09:00)  137 (30 Nov 2022 08:00)  136 (30 Nov 2022 07:00)  143 (30 Nov 2022 06:00)  128 (30 Nov 2022 05:00)  141 (30 Nov 2022 04:00)  126 (30 Nov 2022 03:00)  132 (30 Nov 2022 02:00)  117 (30 Nov 2022 01:00)  147 (30 Nov 2022 00:00)  130 (29 Nov 2022 23:00)  130 (29 Nov 2022 22:00)  137 (29 Nov 2022 21:00)  134 (29 Nov 2022 20:00)  142 (29 Nov 2022 16:00)  161 (29 Nov 2022 15:00)      POCT Blood Glucose.: 133 mg/dL (30 Nov 2022 11:52)  POCT Blood Glucose.: 140 mg/dL (30 Nov 2022 11:07)  POCT Blood Glucose.: 139 mg/dL (30 Nov 2022 10:21)  POCT Blood Glucose.: 147 mg/dL (30 Nov 2022 08:48)  POCT Blood Glucose.: 137 mg/dL (30 Nov 2022 08:16)  POCT Blood Glucose.: 136 mg/dL (30 Nov 2022 06:40)  POCT Blood Glucose.: 143 mg/dL (30 Nov 2022 05:46)  POCT Blood Glucose.: 128 mg/dL (30 Nov 2022 04:48)  POCT Blood Glucose.: 141 mg/dL (30 Nov 2022 03:47)  POCT Blood Glucose.: 126 mg/dL (30 Nov 2022 02:46)  POCT Blood Glucose.: 132 mg/dL (30 Nov 2022 02:03)  POCT Blood Glucose.: 117 mg/dL (30 Nov 2022 00:50)  POCT Blood Glucose.: 142 mg/dL (29 Nov 2022 23:58)  POCT Blood Glucose.: 130 mg/dL (29 Nov 2022 22:47)  POCT Blood Glucose.: 130 mg/dL (29 Nov 2022 21:48)  POCT Blood Glucose.: 134 mg/dL (29 Nov 2022 20:50)  POCT Blood Glucose.: 134 mg/dL (29 Nov 2022 19:51)  POCT Blood Glucose.: 127 mg/dL (29 Nov 2022 18:53)  POCT Blood Glucose.: 143 mg/dL (29 Nov 2022 17:58)  POCT Blood Glucose.: 144 mg/dL (29 Nov 2022 16:51)  POCT Blood Glucose.: 142 mg/dL (29 Nov 2022 15:57)  POCT Blood Glucose.: 143 mg/dL (29 Nov 2022 14:55)  POCT Blood Glucose.: 150 mg/dL (29 Nov 2022 13:45)                          8.0    13.10 )-----------( 93       ( 30 Nov 2022 00:43 )             24.6         11-30    155<H>  |  123<H>  |  67<H>  ----------------------------<  139<H>  4.1   |  19<L>  |  3.10<H>      Calcium, Total Serum: 8.0 mg/dL (11-30-22 @ 00:43)      LFTs:             5.9  | 0.8  | 42       ------------------[70      ( 30 Nov 2022 00:43 )  3.2  | x    | 7           Lipase:x      Amylase:x         Blood Gas Arterial, Lactate: 0.9 mmol/L (11-30-22 @ 07:14)  Blood Gas Arterial, Lactate: 0.8 mmol/L (11-30-22 @ 00:00)  Blood Gas Arterial, Lactate: 0.8 mmol/L (11-29-22 @ 17:29)  Blood Gas Arterial, Lactate: 0.7 mmol/L (11-29-22 @ 13:49)  Blood Gas Arterial, Lactate: 0.7 mmol/L (11-29-22 @ 11:07)  Blood Gas Arterial, Lactate: 0.9 mmol/L (11-29-22 @ 10:42)  Blood Gas Arterial, Lactate: 0.8 mmol/L (11-29-22 @ 10:42)  Blood Gas Arterial, Lactate: 0.8 mmol/L (11-29-22 @ 06:52)  Blood Gas Arterial, Lactate: 0.8 mmol/L (11-29-22 @ 06:16)  Blood Gas Arterial, Lactate: 0.6 mmol/L (11-29-22 @ 06:10)  Blood Gas Arterial, Lactate: 0.8 mmol/L (11-29-22 @ 06:03)  Blood Gas Arterial, Lactate: 1.0 mmol/L (11-29-22 @ 02:50)  Blood Gas Arterial, Lactate: 0.9 mmol/L (11-29-22 @ 00:00)  Blood Gas Arterial, Lactate: 0.9 mmol/L (11-28-22 @ 19:56)  Blood Gas Arterial, Lactate: 1.0 mmol/L (11-28-22 @ 15:06)  Blood Gas Arterial, Lactate: 1.1 mmol/L (11-28-22 @ 10:16)  Blood Gas Arterial, Lactate: 1.2 mmol/L (11-28-22 @ 08:55)  Blood Gas Arterial, Lactate: 1.3 mmol/L (11-28-22 @ 06:15)  Blood Gas Arterial, Lactate: 1.2 mmol/L (11-28-22 @ 00:40)  Blood Gas Venous - Lactate: 1.2 mmol/L (11-28-22 @ 00:40)  Blood Gas Arterial, Lactate: 1.1 mmol/L (11-27-22 @ 20:00)    ABG - ( 30 Nov 2022 07:14 )  pH: 7.40  /  pCO2: 30    /  pO2: 493   / HCO3: 19    / Base Excess: -5.5  /  SaO2: 96.8            ABG - ( 30 Nov 2022 00:00 )  pH: 7.39  /  pCO2: 33    /  pO2: 118   / HCO3: 20    / Base Excess: -4.4  /  SaO2: 97.8            ABG - ( 29 Nov 2022 17:29 )  pH: 7.37  /  pCO2: 33    /  pO2: 122   / HCO3: 19    / Base Excess: -5.6  /  SaO2: 97.9              Coags:     16.1   ----< 1.38    ( 30 Nov 2022 07:05 )     50.4        CARDIAC MARKERS ( 29 Nov 2022 00:26 )  x     / x     / 162 U/L / x     / 4.3 ng/mL      Serum Pro-Brain Natriuretic Peptide: 330 pg/mL (11-24-22 @ 07:57)          Culture - Blood (collected 28 Nov 2022 15:24)  Source: .Blood Blood-Peripheral  Preliminary Report (29 Nov 2022 23:01):    No growth to date.

## 2022-11-30 NOTE — PROGRESS NOTE ADULT - SUBJECTIVE AND OBJECTIVE BOX
Patient seen and examined at the bedside.    Remained critically ill on continuous ICU monitoring.    OBJECTIVE:  Vital Signs Last 24 Hrs  T(C): 37.1 (30 Nov 2022 04:00), Max: 37.2 (30 Nov 2022 00:00)  T(F): 98.8 (30 Nov 2022 04:00), Max: 99 (30 Nov 2022 00:00)  HR: 66 (30 Nov 2022 07:00) (60 - 79)  BP: --  BP(mean): --  RR: 17 (30 Nov 2022 07:00) (14 - 32)  SpO2: 100% (30 Nov 2022 07:00) (97% - 100%)    Parameters below as of 30 Nov 2022 04:00  Patient On (Oxygen Delivery Method): ventilator  O2 Concentration (%): 50    Physical Exam:   General: Intubated, multiple lines gtt  Neurology: sedated > on sedation vacation woke up and followed commands  Eyes: bilateral pupils equal and reactive   ENT/Neck: +ETT midline, Neck supple, trachea midline, No JVD   Respiratory: Clear bilaterally   CV: S1S2, no murmurs        [x] Sinus rhythm  Abdominal: Soft, NT, ND +BS   Extremities: 1-2+ pedal edema noted, + peripheral pulses   Skin: No Rashes, Hematoma, Ecchymosis                        ============================I/O===========================   I&O's Detail    29 Nov 2022 07:01  -  30 Nov 2022 07:00  --------------------------------------------------------  IN:    Argatroban: 4.4 mL    Argatroban: 11 mL    Argatroban: 1.7 mL    Argatroban: 10.2 mL    Argatroban: 7 mL    Dexmedetomidine: 688 mL    Free Water: 1150 mL    Heparin: 22 mL    Insulin: 89 mL    IV PiggyBack: 500 mL    Milrinone: 9.9 mL    NiCARdipine: 25 mL    Platelets - Single Donor: 225 mL    sodium chloride 0.9%: 240 mL    Vital1.5: 470 mL  Total IN: 3453.2 mL    OUT:    Indwelling Catheter - Urethral (mL): 4750 mL  Total OUT: 4750 mL    Total NET: -1296.8 mL  ============================ LABS =========================                        8.0    13.10 )-----------( 93       ( 30 Nov 2022 00:43 )             24.6     11-30    155<H>  |  123<H>  |  67<H>  ----------------------------<  139<H>  4.1   |  19<L>  |  3.10<H>    Ca    8.0<L>      30 Nov 2022 00:43  Phos  2.3     11-30  Mg     2.9     11-30    TPro  5.9<L>  /  Alb  3.2<L>  /  TBili  0.8  /  DBili  x   /  AST  42<H>  /  ALT  7<L>  /  AlkPhos  70  11-30    LIVER FUNCTIONS - ( 30 Nov 2022 00:43 )  Alb: 3.2 g/dL / Pro: 5.9 g/dL / ALK PHOS: 70 U/L / ALT: 7 U/L / AST: 42 U/L / GGT: x           PT/INR - ( 30 Nov 2022 00:37 )   PT: 15.5 sec;   INR: 1.33 ratio         PTT - ( 30 Nov 2022 00:37 )  PTT:49.0 sec  ABG - ( 30 Nov 2022 07:14 )  pH, Arterial: 7.40  pH, Blood: x     /  pCO2: 30    /  pO2: 493   / HCO3: 19    / Base Excess: -5.5  /  SaO2: 96.8    ======================Micro/Rad/Cardio=================  Culture: Reviewed   CXR: Reviewed  Echo: Reviewed  ======================================================  PAST MEDICAL & SURGICAL HISTORY:    ======================================================  Assessment:  63 y/o male with PMH of CABG, DM, HTN, HLD presenting as transfer from [place] for c/f STEMI. PTA arrival received 325 aspirin, 600 plavix, and no heparin drip started. Around 1:30 am patient had multiple episodes of non-bloody diarrhea and emesis with associated abdominal pain and chest pain, unable to localize, non-radiating, with associated SOB and no associated palpitations or diaphoresis. No recent fevers/chills, cough, rashes, or changes in urination. Does report mild diffuse back pain; started 5 days ago in setting on injury while walking and lifting objects. Denies focal weakness, numbness/tingling, or LOC due does report mild dizziness.    Cardiac arrest s/p [facility] ECMO 11/25  Hemodynamic instability  Hypovolemia  Post op respiratory insufficiency  Thrombocytopenia  Acute blood loss anemia  Leukocytosis   ======================================================  Plan:   ***Neuro***  CT head showed 4mm subdural hematoma 11/26: repeat CT head unchanged   [x] Sedated with [x] Precedex   Post operative neuro assessment     ***Cardiovascular***  11/28 TTE: EF 25%, Severe LV dysfunction  Invasive hemodynamic monitoring, assess perfusion indices   SR / CVP 5/ MAP 82/ PAP 17/ Hct 23.0%/ Lactate 0.9  [x] Cardene - 2mg/hr   [x] ECMO- 3300 rpm, 3.17 flow  Reassessment of hemodynamics post resuscitation   [x] Statin  [x] AC Therapy with Argatroban gtt    ***Pulmonary***  [x]  Nitric oxide- 20 ppm   Post op vent management   Titration of FiO2 and PEEP, follow SpO2, CXR, blood gasses   100% FiO2 on ECMO  Titrate sweep for pco2 and ph    Mode: AC/ CMV (Assist Control/ Continuous Mandatory Ventilation)  RR (machine): 14  FiO2: 50  PEEP: 10  ITime: 1  MAP: 12  PC: 10  PIP: 22    ***GI***  [x] Tolerating TF Vital AF, @ 20cc/hr.   [x] Protonix    Bowel regimen with Miralax and senna     ***Renal***  Continue to monitor I/Os, BUN/Creatinine.   Replete lytes PRN  De Dios present  Given Lasix 20 x1   C/w Free water 300 q6     ***ID***  Meropenem for empiric coverage  Follow up cultures    ***Endocrine***  [x]  DM2: HbA1c 7.3%                - [x] Insulin gtt              - Need tight glycemic control to prevent wound infection.      Patient requires continuous monitoring with:  bedside rhythm monitoring, arterial line, pulse oximetry, ventilator management and monitoring; intermittent blood gas analysis. Care plan discussed with ICU care team.  patient remain critical; required more than usual post op care; I have spent 35 minutes providing non routine post op care, revaluated multiple times during the day .     Care Plan discussed with the ICU care team. Patient remained critical, at risk for life threatening decompensation.     By signing my name below, I, Rosio Alonzo, attest that this documentation has been prepared under the direction and in the presence of Yusef Millan MD.  Electronically signed: Brian Burnett, 11-30-22 @ 07:50    I, Monty Holbrook, personally performed the services described in this documentation. all medical record entries made by the scribe were at my direction and in my presence. I have reviewed the chart and agree that the record reflects my personal performance and is accurate and complete  Electronically signed: Yusef Millan MD. Patient seen and examined at the bedside.    Remained critically ill on continuous ICU monitoring.    OBJECTIVE:  Vital Signs Last 24 Hrs  T(C): 37.1 (30 Nov 2022 04:00), Max: 37.2 (30 Nov 2022 00:00)  T(F): 98.8 (30 Nov 2022 04:00), Max: 99 (30 Nov 2022 00:00)  HR: 66 (30 Nov 2022 07:00) (60 - 79)  BP: --  BP(mean): --  RR: 17 (30 Nov 2022 07:00) (14 - 32)  SpO2: 100% (30 Nov 2022 07:00) (97% - 100%)    Parameters below as of 30 Nov 2022 04:00  Patient On (Oxygen Delivery Method): ventilator  O2 Concentration (%): 50    Physical Exam:   General: Intubated, multiple lines gtt  Neurology: sedated > on sedation vacation woke up and followed commands  Eyes: bilateral pupils equal and reactive   ENT/Neck: +ETT midline, Neck supple, trachea midline, No JVD   Respiratory: Clear bilaterally   CV: S1S2, no murmurs        [x] Sinus rhythm  Abdominal: Soft, NT, ND +BS   Extremities: 1-2+ pedal edema noted, + peripheral pulses   Skin: No Rashes, Hematoma, Ecchymosis                        ============================I/O===========================   I&O's Detail    29 Nov 2022 07:01  -  30 Nov 2022 07:00  --------------------------------------------------------  IN:    Argatroban: 4.4 mL    Argatroban: 11 mL    Argatroban: 1.7 mL    Argatroban: 10.2 mL    Argatroban: 7 mL    Dexmedetomidine: 688 mL    Free Water: 1150 mL    Heparin: 22 mL    Insulin: 89 mL    IV PiggyBack: 500 mL    Milrinone: 9.9 mL    NiCARdipine: 25 mL    Platelets - Single Donor: 225 mL    sodium chloride 0.9%: 240 mL    Vital1.5: 470 mL  Total IN: 3453.2 mL    OUT:    Indwelling Catheter - Urethral (mL): 4750 mL  Total OUT: 4750 mL    Total NET: -1296.8 mL  ============================ LABS =========================                        8.0    13.10 )-----------( 93       ( 30 Nov 2022 00:43 )             24.6     11-30    155<H>  |  123<H>  |  67<H>  ----------------------------<  139<H>  4.1   |  19<L>  |  3.10<H>    Ca    8.0<L>      30 Nov 2022 00:43  Phos  2.3     11-30  Mg     2.9     11-30    TPro  5.9<L>  /  Alb  3.2<L>  /  TBili  0.8  /  DBili  x   /  AST  42<H>  /  ALT  7<L>  /  AlkPhos  70  11-30    LIVER FUNCTIONS - ( 30 Nov 2022 00:43 )  Alb: 3.2 g/dL / Pro: 5.9 g/dL / ALK PHOS: 70 U/L / ALT: 7 U/L / AST: 42 U/L / GGT: x           PT/INR - ( 30 Nov 2022 00:37 )   PT: 15.5 sec;   INR: 1.33 ratio         PTT - ( 30 Nov 2022 00:37 )  PTT:49.0 sec  ABG - ( 30 Nov 2022 07:14 )  pH, Arterial: 7.40  pH, Blood: x     /  pCO2: 30    /  pO2: 493   / HCO3: 19    / Base Excess: -5.5  /  SaO2: 96.8    ======================Micro/Rad/Cardio=================  Culture: Reviewed   CXR: Reviewed  Echo: Reviewed  ======================================================  PAST MEDICAL & SURGICAL HISTORY:    ======================================================  Assessment:  61 y/o male with PMH of CABG, DM, HTN, HLD presenting as transfer from [place] for c/f STEMI. PTA arrival received 325 aspirin, 600 plavix, and no heparin drip started. Around 1:30 am patient had multiple episodes of non-bloody diarrhea and emesis with associated abdominal pain and chest pain, unable to localize, non-radiating, with associated SOB and no associated palpitations or diaphoresis. No recent fevers/chills, cough, rashes, or changes in urination. Does report mild diffuse back pain; started 5 days ago in setting on injury while walking and lifting objects. Denies focal weakness, numbness/tingling, or LOC due does report mild dizziness.    Cardiac arrest s/p [facility] ECMO 11/25  Hemodynamic instability  Hypovolemia  Post op respiratory insufficiency  Thrombocytopenia  Acute blood loss anemia  Leukocytosis   ======================================================  Plan:   ***Neuro***  CT head showed 4mm subdural hematoma 11/26: repeat CT head unchanged   [x] Sedated with [x] Precedex   Post operative neuro assessment     ***Cardiovascular***  11/28 TTE: EF 25%, Severe LV dysfunction  Invasive hemodynamic monitoring, assess perfusion indices   SR / CVP 5/ MAP 82/ PAP 17/ Hct 23.0%/ Lactate 0.9  [x] Cardene - 2mg/hr   [x] ECMO- 3300 rpm, 3.17 flow  Reassessment of hemodynamics post resuscitation   [x] Statin  [x] AC Therapy with Argatroban gtt    ***Pulmonary***  [x]  Nitric oxide- 20 ppm   Post op vent management   Titration of FiO2 and PEEP, follow SpO2, CXR, blood gasses   100% FiO2 on ECMO  Titrate sweep for pco2 and ph    Mode: AC/ CMV (Assist Control/ Continuous Mandatory Ventilation)  RR (machine): 14  FiO2: 50  PEEP: 10  ITime: 1  MAP: 12  PC: 10  PIP: 22    ***GI***  [x] Tolerating TF Vital AF, @ 20cc/hr.   [x] Protonix    Bowel regimen with Miralax and senna     ***Renal***  Continue to monitor I/Os, BUN/Creatinine.   Replete lytes PRN  De Dios present  Given Lasix 20 x1   C/w Free water 300 q6     ***ID***  Meropenem for empiric coverage  Follow up cultures    ***Endocrine***  [x]  DM2: HbA1c 7.3%                - [x] Insulin gtt              - Need tight glycemic control to prevent wound infection.      Patient requires continuous monitoring with:  bedside rhythm monitoring, arterial line, pulse oximetry, ventilator management and monitoring; intermittent blood gas analysis. Care plan discussed with ICU care team.  patient remain critical; required more than usual post op care; I have spent 35 minutes providing non routine post op care, revaluated multiple times during the day .     Care Plan discussed with the ICU care team. Patient remained critical, at risk for life threatening decompensation.     By signing my name below, I, Rosio Alonzo, attest that this documentation has been prepared under the direction and in the presence of Yusef Millan MD.  Electronically signed: Brian Burnett, 11-30-22 @ 07:50    I, Monty Holbrook, personally performed the services described in this documentation. all medical record entries made by the scribe were at my direction and in my presence. I have reviewed the chart and agree that the record reflects my personal performance and is accurate and complete  Electronically signed: Yusef Millan MD. Patient seen and examined at the bedside.    Remained critically ill on continuous ICU monitoring.    OBJECTIVE:  Vital Signs Last 24 Hrs  T(C): 37.1 (30 Nov 2022 04:00), Max: 37.2 (30 Nov 2022 00:00)  T(F): 98.8 (30 Nov 2022 04:00), Max: 99 (30 Nov 2022 00:00)  HR: 66 (30 Nov 2022 07:00) (60 - 79)  BP: --  BP(mean): --  RR: 17 (30 Nov 2022 07:00) (14 - 32)  SpO2: 100% (30 Nov 2022 07:00) (97% - 100%)    Parameters below as of 30 Nov 2022 04:00  Patient On (Oxygen Delivery Method): ventilator  O2 Concentration (%): 50    Physical Exam:   General: Intubated, multiple lines gtt  Neurology: sedated > on sedation vacation woke up and followed commands  Eyes: bilateral pupils equal and reactive   ENT/Neck: +ETT midline, Neck supple, trachea midline, No JVD   Respiratory: Clear bilaterally   CV: S1S2, no murmurs        [x] Sinus rhythm  Abdominal: Soft, NT, ND +BS   Extremities: 1-2+ pedal edema noted, + peripheral pulses   Skin: No Rashes, Hematoma, Ecchymosis                        ============================I/O===========================   I&O's Detail    29 Nov 2022 07:01  -  30 Nov 2022 07:00  --------------------------------------------------------  IN:    Argatroban: 4.4 mL    Argatroban: 11 mL    Argatroban: 1.7 mL    Argatroban: 10.2 mL    Argatroban: 7 mL    Dexmedetomidine: 688 mL    Free Water: 1150 mL    Heparin: 22 mL    Insulin: 89 mL    IV PiggyBack: 500 mL    Milrinone: 9.9 mL    NiCARdipine: 25 mL    Platelets - Single Donor: 225 mL    sodium chloride 0.9%: 240 mL    Vital1.5: 470 mL  Total IN: 3453.2 mL    OUT:    Indwelling Catheter - Urethral (mL): 4750 mL  Total OUT: 4750 mL    Total NET: -1296.8 mL  ============================ LABS =========================                        8.0    13.10 )-----------( 93       ( 30 Nov 2022 00:43 )             24.6     11-30    155<H>  |  123<H>  |  67<H>  ----------------------------<  139<H>  4.1   |  19<L>  |  3.10<H>    Ca    8.0<L>      30 Nov 2022 00:43  Phos  2.3     11-30  Mg     2.9     11-30    TPro  5.9<L>  /  Alb  3.2<L>  /  TBili  0.8  /  DBili  x   /  AST  42<H>  /  ALT  7<L>  /  AlkPhos  70  11-30    LIVER FUNCTIONS - ( 30 Nov 2022 00:43 )  Alb: 3.2 g/dL / Pro: 5.9 g/dL / ALK PHOS: 70 U/L / ALT: 7 U/L / AST: 42 U/L / GGT: x           PT/INR - ( 30 Nov 2022 00:37 )   PT: 15.5 sec;   INR: 1.33 ratio         PTT - ( 30 Nov 2022 00:37 )  PTT:49.0 sec  ABG - ( 30 Nov 2022 07:14 )  pH, Arterial: 7.40  pH, Blood: x     /  pCO2: 30    /  pO2: 493   / HCO3: 19    / Base Excess: -5.5  /  SaO2: 96.8    ======================Micro/Rad/Cardio=================  Culture: Reviewed   CXR: Reviewed  Echo: Reviewed  ======================================================  PAST MEDICAL & SURGICAL HISTORY:    ======================================================  Assessment:  61 y/o male with PMH of CABG, DM, HTN, HLD presenting as transfer from [place] for c/f STEMI. PTA arrival received 325 aspirin, 600 plavix, and no heparin drip started. Around 1:30 am patient had multiple episodes of non-bloody diarrhea and emesis with associated abdominal pain and chest pain, unable to localize, non-radiating, with associated SOB and no associated palpitations or diaphoresis. No recent fevers/chills, cough, rashes, or changes in urination. Does report mild diffuse back pain; started 5 days ago in setting on injury while walking and lifting objects. Denies focal weakness, numbness/tingling, or LOC due does report mild dizziness.    Cardiac arrest s/p [facility] ECMO 11/25  Hemodynamic instability  Hypovolemia  Post op respiratory insufficiency  Thrombocytopenia  Acute blood loss anemia  Leukocytosis   ======================================================  Plan:   ***Neuro***  CT head showed 4mm subdural hematoma 11/26: repeat CT head unchanged   [x] Sedated with [x] Precedex   Post operative neuro assessment     ***Cardiovascular***  11/28 TTE: EF 25%, Severe LV dysfunction  Invasive hemodynamic monitoring, assess perfusion indices   SR / CVP 5/ MAP 82/ PAP 17/ Hct 23.0%/ Lactate 0.9  [x] Cardene - 2mg/hr   [x] ECMO- 3300 rpm, 3.17 flow  Reassessment of hemodynamics post resuscitation   [x] Statin  [x] AC Therapy with Argatroban gtt    ***Pulmonary***  [x]  Nitric oxide- 20 ppm   Post op vent management   Titration of FiO2 and PEEP, follow SpO2, CXR, blood gasses   100% FiO2 on ECMO  Titrate sweep for pco2 and ph    Mode: AC/ CMV (Assist Control/ Continuous Mandatory Ventilation)  RR (machine): 14  FiO2: 50  PEEP: 10  ITime: 1  MAP: 12  PC: 10  PIP: 22    ***GI***  [x] Tolerating TF Vital AF, @ 20cc/hr.   [x] Protonix    Bowel regimen with Miralax and senna     ***Renal***  Continue to monitor I/Os, BUN/Creatinine.   Replete lytes PRN  De Dios present  Given Lasix 20 x1   C/w Free water 300 q6     ***ID***  Meropenem for empiric coverage  Follow up cultures    ***Endocrine***  [x]  DM2: HbA1c 7.3%                - [x] Insulin gtt              - Need tight glycemic control to prevent wound infection.      Patient requires continuous monitoring with:  bedside rhythm monitoring, arterial line, pulse oximetry, ventilator management and monitoring; intermittent blood gas analysis. Care plan discussed with ICU care team.  patient remain critical; required more than usual post op care; I have spent 35 minutes providing non routine post op care, revaluated multiple times during the day .     Care Plan discussed with the ICU care team. Patient remained critical, at risk for life threatening decompensation.     By signing my name below, I, Rosio Alonzo, attest that this documentation has been prepared under the direction and in the presence of Yusef Millan MD.  Electronically signed: Brian Burnett, 11-30-22 @ 07:50    I, Monty Holbrook, personally performed the services described in this documentation. all medical record entries made by the scribe were at my direction and in my presence. I have reviewed the chart and agree that the record reflects my personal performance and is accurate and complete  Electronically signed: Yusef Millan MD. Patient seen and examined at the bedside.    Remained critically ill on continuous ICU monitoring.    OBJECTIVE:  Vital Signs Last 24 Hrs  T(C): 37.1 (30 Nov 2022 04:00), Max: 37.2 (30 Nov 2022 00:00)  T(F): 98.8 (30 Nov 2022 04:00), Max: 99 (30 Nov 2022 00:00)  HR: 66 (30 Nov 2022 07:00) (60 - 79)  BP: --  BP(mean): --  RR: 17 (30 Nov 2022 07:00) (14 - 32)  SpO2: 100% (30 Nov 2022 07:00) (97% - 100%)    Parameters below as of 30 Nov 2022 04:00  Patient On (Oxygen Delivery Method): ventilator  O2 Concentration (%): 50    Physical Exam:   General: Intubated, multiple lines gtt  Neurology: sedated > on sedation vacation woke up and followed commands  Eyes: bilateral pupils equal and reactive   ENT/Neck: +ETT midline, Neck supple, trachea midline, No JVD   Respiratory: Clear bilaterally   CV: S1S2, no murmurs        [x] Sinus rhythm  Abdominal: Soft, NT, ND +BS   Extremities: 1-2+ pedal edema noted, + peripheral pulses   Skin: No Rashes, Hematoma, Ecchymosis                        ============================I/O===========================   I&O's Detail    29 Nov 2022 07:01  -  30 Nov 2022 07:00  --------------------------------------------------------  IN:    Argatroban: 4.4 mL    Argatroban: 11 mL    Argatroban: 1.7 mL    Argatroban: 10.2 mL    Argatroban: 7 mL    Dexmedetomidine: 688 mL    Free Water: 1150 mL    Heparin: 22 mL    Insulin: 89 mL    IV PiggyBack: 500 mL    Milrinone: 9.9 mL    NiCARdipine: 25 mL    Platelets - Single Donor: 225 mL    sodium chloride 0.9%: 240 mL    Vital1.5: 470 mL  Total IN: 3453.2 mL    OUT:    Indwelling Catheter - Urethral (mL): 4750 mL  Total OUT: 4750 mL    Total NET: -1296.8 mL  ============================ LABS =========================                        8.0    13.10 )-----------( 93       ( 30 Nov 2022 00:43 )             24.6     11-30    155<H>  |  123<H>  |  67<H>  ----------------------------<  139<H>  4.1   |  19<L>  |  3.10<H>    Ca    8.0<L>      30 Nov 2022 00:43  Phos  2.3     11-30  Mg     2.9     11-30    TPro  5.9<L>  /  Alb  3.2<L>  /  TBili  0.8  /  DBili  x   /  AST  42<H>  /  ALT  7<L>  /  AlkPhos  70  11-30    LIVER FUNCTIONS - ( 30 Nov 2022 00:43 )  Alb: 3.2 g/dL / Pro: 5.9 g/dL / ALK PHOS: 70 U/L / ALT: 7 U/L / AST: 42 U/L / GGT: x           PT/INR - ( 30 Nov 2022 00:37 )   PT: 15.5 sec;   INR: 1.33 ratio         PTT - ( 30 Nov 2022 00:37 )  PTT:49.0 sec  ABG - ( 30 Nov 2022 07:14 )  pH, Arterial: 7.40  pH, Blood: x     /  pCO2: 30    /  pO2: 493   / HCO3: 19    / Base Excess: -5.5  /  SaO2: 96.8    ======================Micro/Rad/Cardio=================  Culture: Reviewed   CXR: Reviewed  Echo: Reviewed  ======================================================  PAST MEDICAL & SURGICAL HISTORY:    ======================================================  Assessment:  63 y/o male with PMH of CABG, DM, HTN, HLD presenting as transfer from [place] for c/f STEMI. PTA arrival received 325 aspirin, 600 plavix, and no heparin drip started. Around 1:30 am patient had multiple episodes of non-bloody diarrhea and emesis with associated abdominal pain and chest pain, unable to localize, non-radiating, with associated SOB and no associated palpitations or diaphoresis. No recent fevers/chills, cough, rashes, or changes in urination. Does report mild diffuse back pain; started 5 days ago in setting on injury while walking and lifting objects. Denies focal weakness, numbness/tingling, or LOC due does report mild dizziness.    Cardiac arrest s/p [facility] ECMO 11/25  cardiogenic shock  Hemodynamic instability  acute respiratory failure  Thrombocytopenia  anemia  Leukocytosis   ======================================================  Plan:   ***Neuro***  CT head showed 4mm subdural hematoma 11/26: repeat CT head unchanged   [x] Sedated with [x] Precedex   Post operative neuro assessment     ***Cardiovascular***  11/28 TTE: EF 25%, Severe LV dysfunction  Invasive hemodynamic monitoring, assess perfusion indices   SR / CVP 5/ MAP 82/ PAP 17/ Hct 23.0%/ Lactate 0.9  [x] Cardene - 2mg/hr   [x] ECMO- 3300 rpm, 3.17 flow  Reassessment of hemodynamics post resuscitation   [x] Statin  [x] AC Therapy with Argatroban gtt    ***Pulmonary***  [x]  Nitric oxide- 20 ppm   Post op vent management   Titration of FiO2 and PEEP, follow SpO2, CXR, blood gasses   100% FiO2 on ECMO  Titrate sweep for pco2 and ph    Mode: AC/ CMV (Assist Control/ Continuous Mandatory Ventilation)  RR (machine): 14  FiO2: 50  PEEP: 10  ITime: 1  MAP: 12  PC: 10  PIP: 22    ***GI***  [x] Tolerating TF Vital AF, @ 20cc/hr.   [x] Protonix    Bowel regimen with Miralax and senna     ***Renal***  Continue to monitor I/Os, BUN/Creatinine.   Replete lytes PRN  De Dios present  Given Lasix 20 x1   C/w Free water 300 q6     ***ID***  Meropenem for empiric coverage  Follow up cultures    ***Endocrine***  [x]  DM2: HbA1c 7.3%                - [x] Insulin gtt              - Need tight glycemic control to prevent wound infection.      Patient requires continuous monitoring with:  bedside rhythm monitoring, arterial line, pulse oximetry, ventilator management and monitoring; intermittent blood gas analysis. Care plan discussed with ICU care team.  patient remain critical; required more than usual post op care; I have spent 35 minutes providing non routine post op care, revaluated multiple times during the day .     Care Plan discussed with the ICU care team. Patient remained critical, at risk for life threatening decompensation.     By signing my name below, I, Rosio Alonzo, attest that this documentation has been prepared under the direction and in the presence of Yusef Millan MD.  Electronically signed: Brian Burnett, 11-30-22 @ 07:50    I, Monty Holbrook, personally performed the services described in this documentation. all medical record entries made by the scribe were at my direction and in my presence. I have reviewed the chart and agree that the record reflects my personal performance and is accurate and complete  Electronically signed: Yusef Millan MD. Patient seen and examined at the bedside.    Remained critically ill on continuous ICU monitoring.    OBJECTIVE:  Vital Signs Last 24 Hrs  T(C): 37.1 (30 Nov 2022 04:00), Max: 37.2 (30 Nov 2022 00:00)  T(F): 98.8 (30 Nov 2022 04:00), Max: 99 (30 Nov 2022 00:00)  HR: 66 (30 Nov 2022 07:00) (60 - 79)  BP: --  BP(mean): --  RR: 17 (30 Nov 2022 07:00) (14 - 32)  SpO2: 100% (30 Nov 2022 07:00) (97% - 100%)    Parameters below as of 30 Nov 2022 04:00  Patient On (Oxygen Delivery Method): ventilator  O2 Concentration (%): 50    Physical Exam:   General: Intubated, multiple lines gtt  Neurology: sedated > on sedation vacation woke up and followed commands  Eyes: bilateral pupils equal and reactive   ENT/Neck: +ETT midline, Neck supple, trachea midline, No JVD   Respiratory: Clear bilaterally   CV: S1S2, no murmurs        [x] Sinus rhythm  Abdominal: Soft, NT, ND +BS   Extremities: 1-2+ pedal edema noted, + peripheral pulses   Skin: No Rashes, Hematoma, Ecchymosis                        ============================I/O===========================   I&O's Detail    29 Nov 2022 07:01  -  30 Nov 2022 07:00  --------------------------------------------------------  IN:    Argatroban: 4.4 mL    Argatroban: 11 mL    Argatroban: 1.7 mL    Argatroban: 10.2 mL    Argatroban: 7 mL    Dexmedetomidine: 688 mL    Free Water: 1150 mL    Heparin: 22 mL    Insulin: 89 mL    IV PiggyBack: 500 mL    Milrinone: 9.9 mL    NiCARdipine: 25 mL    Platelets - Single Donor: 225 mL    sodium chloride 0.9%: 240 mL    Vital1.5: 470 mL  Total IN: 3453.2 mL    OUT:    Indwelling Catheter - Urethral (mL): 4750 mL  Total OUT: 4750 mL    Total NET: -1296.8 mL  ============================ LABS =========================                        8.0    13.10 )-----------( 93       ( 30 Nov 2022 00:43 )             24.6     11-30    155<H>  |  123<H>  |  67<H>  ----------------------------<  139<H>  4.1   |  19<L>  |  3.10<H>    Ca    8.0<L>      30 Nov 2022 00:43  Phos  2.3     11-30  Mg     2.9     11-30    TPro  5.9<L>  /  Alb  3.2<L>  /  TBili  0.8  /  DBili  x   /  AST  42<H>  /  ALT  7<L>  /  AlkPhos  70  11-30    LIVER FUNCTIONS - ( 30 Nov 2022 00:43 )  Alb: 3.2 g/dL / Pro: 5.9 g/dL / ALK PHOS: 70 U/L / ALT: 7 U/L / AST: 42 U/L / GGT: x           PT/INR - ( 30 Nov 2022 00:37 )   PT: 15.5 sec;   INR: 1.33 ratio         PTT - ( 30 Nov 2022 00:37 )  PTT:49.0 sec  ABG - ( 30 Nov 2022 07:14 )  pH, Arterial: 7.40  pH, Blood: x     /  pCO2: 30    /  pO2: 493   / HCO3: 19    / Base Excess: -5.5  /  SaO2: 96.8    ======================Micro/Rad/Cardio=================  Culture: Reviewed   CXR: Reviewed  Echo: Reviewed  ======================================================  PAST MEDICAL & SURGICAL HISTORY:    ======================================================  Assessment:  63 y/o male with PMH of CABG, DM, HTN, HLD presenting as transfer from [place] for c/f STEMI. PTA arrival received 325 aspirin, 600 plavix, and no heparin drip started. Around 1:30 am patient had multiple episodes of non-bloody diarrhea and emesis with associated abdominal pain and chest pain, unable to localize, non-radiating, with associated SOB and no associated palpitations or diaphoresis. No recent fevers/chills, cough, rashes, or changes in urination. Does report mild diffuse back pain; started 5 days ago in setting on injury while walking and lifting objects. Denies focal weakness, numbness/tingling, or LOC due does report mild dizziness.    Cardiac arrest s/p VA ECMO 11/25  cardiogenic shock  Hemodynamic instability  acute respiratory failure  Thrombocytopenia  anemia  Leukocytosis   ======================================================  Plan:   ***Neuro***  CT head showed 4mm subdural hematoma 11/26: repeat CT head unchanged   [x] Sedated with [x] Precedex   Post operative neuro assessment     ***Cardiovascular***  11/28 TTE: EF 25%, Severe LV dysfunction  Invasive hemodynamic monitoring, assess perfusion indices   SR / CVP 5/ MAP 82/ PAP 17/ Hct 23.0%/ Lactate 0.9  [x] Cardene - 2mg/hr   [x] ECMO- 3300 rpm, 3.17 flow  Reassessment of hemodynamics post resuscitation   [x] Statin  [x] AC Therapy with Argatroban gtt    ***Pulmonary***  [x]  Nitric oxide- 20 ppm   Post op vent management   Titration of FiO2 and PEEP, follow SpO2, CXR, blood gasses   100% FiO2 on ECMO  Titrate sweep for pco2 and ph    Mode: AC/ CMV (Assist Control/ Continuous Mandatory Ventilation)  RR (machine): 14  FiO2: 50  PEEP: 10  ITime: 1  MAP: 12  PC: 10  PIP: 22    ***GI***  [x] Tolerating TF Vital AF, @ 20cc/hr.   [x] Protonix    Bowel regimen with Miralax and senna     ***Renal***  Continue to monitor I/Os, BUN/Creatinine.   Replete lytes PRN  De Dios present  Given Lasix 20 x1   C/w Free water 300 q6     ***ID***  Meropenem for empiric coverage  Follow up cultures    ***Endocrine***  [x]  DM2: HbA1c 7.3%                - [x] Insulin gtt              - Need tight glycemic control to prevent wound infection.      Patient requires continuous monitoring with:  bedside rhythm monitoring, arterial line, pulse oximetry, ventilator management and monitoring; intermittent blood gas analysis. Care plan discussed with ICU care team.  patient remain critical; required more than usual post op care; I have spent 35 minutes providing non routine post op care, revaluated multiple times during the day .     Care Plan discussed with the ICU care team. Patient remained critical, at risk for life threatening decompensation.     By signing my name below, I, Rosio Alonzo, attest that this documentation has been prepared under the direction and in the presence of Yusef Millan MD.  Electronically signed: Brian Burnett, 11-30-22 @ 07:50    I, Monty Holbrook, personally performed the services described in this documentation. all medical record entries made by the scribe were at my direction and in my presence. I have reviewed the chart and agree that the record reflects my personal performance and is accurate and complete  Electronically signed: Yusef Millan MD. Patient seen and examined at the bedside.    Remained critically ill on continuous ICU monitoring.    OBJECTIVE:  Vital Signs Last 24 Hrs  T(C): 37.1 (30 Nov 2022 04:00), Max: 37.2 (30 Nov 2022 00:00)  T(F): 98.8 (30 Nov 2022 04:00), Max: 99 (30 Nov 2022 00:00)  HR: 66 (30 Nov 2022 07:00) (60 - 79)  BP: --  BP(mean): --  RR: 17 (30 Nov 2022 07:00) (14 - 32)  SpO2: 100% (30 Nov 2022 07:00) (97% - 100%)    Parameters below as of 30 Nov 2022 04:00  Patient On (Oxygen Delivery Method): ventilator  O2 Concentration (%): 50    Physical Exam:   General: Intubated, multiple lines gtt  Neurology: sedated > on sedation vacation woke up and followed commands  Eyes: bilateral pupils equal and reactive   ENT/Neck: +ETT midline, Neck supple, trachea midline, No JVD   Respiratory: Clear bilaterally   CV: S1S2, no murmurs        [x] Sinus rhythm  Abdominal: Soft, NT, ND +BS   Extremities: 1-2+ pedal edema noted, + peripheral pulses   Skin: No Rashes, Hematoma, Ecchymosis                        ============================I/O===========================   I&O's Detail    29 Nov 2022 07:01  -  30 Nov 2022 07:00  --------------------------------------------------------  IN:    Argatroban: 4.4 mL    Argatroban: 11 mL    Argatroban: 1.7 mL    Argatroban: 10.2 mL    Argatroban: 7 mL    Dexmedetomidine: 688 mL    Free Water: 1150 mL    Heparin: 22 mL    Insulin: 89 mL    IV PiggyBack: 500 mL    Milrinone: 9.9 mL    NiCARdipine: 25 mL    Platelets - Single Donor: 225 mL    sodium chloride 0.9%: 240 mL    Vital1.5: 470 mL  Total IN: 3453.2 mL    OUT:    Indwelling Catheter - Urethral (mL): 4750 mL  Total OUT: 4750 mL    Total NET: -1296.8 mL  ============================ LABS =========================                        8.0    13.10 )-----------( 93       ( 30 Nov 2022 00:43 )             24.6     11-30    155<H>  |  123<H>  |  67<H>  ----------------------------<  139<H>  4.1   |  19<L>  |  3.10<H>    Ca    8.0<L>      30 Nov 2022 00:43  Phos  2.3     11-30  Mg     2.9     11-30    TPro  5.9<L>  /  Alb  3.2<L>  /  TBili  0.8  /  DBili  x   /  AST  42<H>  /  ALT  7<L>  /  AlkPhos  70  11-30    LIVER FUNCTIONS - ( 30 Nov 2022 00:43 )  Alb: 3.2 g/dL / Pro: 5.9 g/dL / ALK PHOS: 70 U/L / ALT: 7 U/L / AST: 42 U/L / GGT: x           PT/INR - ( 30 Nov 2022 00:37 )   PT: 15.5 sec;   INR: 1.33 ratio         PTT - ( 30 Nov 2022 00:37 )  PTT:49.0 sec  ABG - ( 30 Nov 2022 07:14 )  pH, Arterial: 7.40  pH, Blood: x     /  pCO2: 30    /  pO2: 493   / HCO3: 19    / Base Excess: -5.5  /  SaO2: 96.8    ======================Micro/Rad/Cardio=================  Culture: Reviewed   CXR: Reviewed  Echo: Reviewed  ======================================================  PAST MEDICAL & SURGICAL HISTORY:    ======================================================  Assessment:  61 y/o male with PMH of CABG, DM, HTN, HLD presenting as transfer from [place] for c/f STEMI. PTA arrival received 325 aspirin, 600 plavix, and no heparin drip started. Around 1:30 am patient had multiple episodes of non-bloody diarrhea and emesis with associated abdominal pain and chest pain, unable to localize, non-radiating, with associated SOB and no associated palpitations or diaphoresis. No recent fevers/chills, cough, rashes, or changes in urination. Does report mild diffuse back pain; started 5 days ago in setting on injury while walking and lifting objects. Denies focal weakness, numbness/tingling, or LOC due does report mild dizziness.    Cardiac arrest s/p VA ECMO 11/25  cardiogenic shock  Hemodynamic instability  acute respiratory failure  Thrombocytopenia  anemia  Leukocytosis   ======================================================  Plan:   ***Neuro***  CT head showed 4mm subdural hematoma 11/26: repeat CT head unchanged   [x] Sedated with [x] Precedex   Post operative neuro assessment     ***Cardiovascular***  11/28 TTE: EF 25%, Severe LV dysfunction  Invasive hemodynamic monitoring, assess perfusion indices   SR / CVP 5/ MAP 82/ PAP 17/ Hct 23.0%/ Lactate 0.9  [x] Cardene - 2mg/hr   [x] ECMO- 3300 rpm, 3.17 flow  Reassessment of hemodynamics post resuscitation   [x] Statin  [x] AC Therapy with Argatroban gtt    ***Pulmonary***  [x]  Nitric oxide- 20 ppm   Post op vent management   Titration of FiO2 and PEEP, follow SpO2, CXR, blood gasses   100% FiO2 on ECMO  Titrate sweep for pco2 and ph    Mode: AC/ CMV (Assist Control/ Continuous Mandatory Ventilation)  RR (machine): 14  FiO2: 50  PEEP: 10  ITime: 1  MAP: 12  PC: 10  PIP: 22    ***GI***  [x] Tolerating TF Vital AF, @ 20cc/hr.   [x] Protonix    Bowel regimen with Miralax and senna     ***Renal***  Continue to monitor I/Os, BUN/Creatinine.   Replete lytes PRN  De Dios present  Given Lasix 20 x1   C/w Free water 300 q6     ***ID***  Meropenem for empiric coverage  Follow up cultures    ***Endocrine***  [x]  DM2: HbA1c 7.3%                - [x] Insulin gtt              - Need tight glycemic control to prevent wound infection.      Patient requires continuous monitoring with:  bedside rhythm monitoring, arterial line, pulse oximetry, ventilator management and monitoring; intermittent blood gas analysis. Care plan discussed with ICU care team.  patient remain critical; required more than usual post op care; I have spent 35 minutes providing non routine post op care, revaluated multiple times during the day .     Care Plan discussed with the ICU care team. Patient remained critical, at risk for life threatening decompensation.     By signing my name below, I, Rosio Alonzo, attest that this documentation has been prepared under the direction and in the presence of Yusef Millan MD.  Electronically signed: Brian Burnett, 11-30-22 @ 07:50    I, Monty Holbrook, personally performed the services described in this documentation. all medical record entries made by the scribe were at my direction and in my presence. I have reviewed the chart and agree that the record reflects my personal performance and is accurate and complete  Electronically signed: Yusef Millan MD.

## 2022-11-30 NOTE — PROGRESS NOTE ADULT - ATTENDING COMMENTS
Melissa Mercer MD  Off: [phone]  contact me on teams    (After 5 pm or on weekends please page the on-call fellow/attending, can check [email or web address] for schedule. Login is carmen cantor, schedule under [facility] medicine, psych, derm) Melissa Mercer MD  Off: [phone]  contact me on teams    (After 5 pm or on weekends please page the on-call fellow/attending, can check [email or web address] for schedule. Login is carmen cantor, schedule under [facility] medicine, psych, derm) Melissa Mercer MD  Off: [phone]  contact me on teams    (After 5 pm or on weekends please page the on-call fellow/attending, can check [email or web address] for schedule. Login is carmen cantor, schedule under [facility] medicine, psych, derm)

## 2022-11-30 NOTE — PROGRESS NOTE ADULT - PROBLEM SELECTOR PLAN 3
- CXR showing some improvement  - c/w ECMO , adjust sweep and FdO2 according to ABG  - extubate when able  - continue checking right radial ABG - CXR showing some improvement  - continue diuresis   - c/w ECMO , adjust sweep and FdO2 according to ABG  - extubate when able  - continue checking right radial ABG

## 2022-11-30 NOTE — PROGRESS NOTE ADULT - PROBLEM SELECTOR PLAN 1
- tolerating ECMO turn down without pressors or inotropes  - he is a candidate for ECMO decannulation  - reasonable to maintain ECMO pending further lung and renal recovery  - eventual [facility] to assess coronaries +/- IABP   - cont argastroban for AC while on ECMO  - advanced therapies evaluation premature at this time given hope for recovery and progress at this time.  - diurese to goal net negative 2L. - tolerating ECMO turn down without pressors or inotropes  - he is a candidate for ECMO decannulation  - reasonable to maintain ECMO pending further lung and renal recovery  - eventual [facility] to assess coronaries +/- IABP   - cont argastroban for AC while on ECMO  - advanced therapies evaluation premature at this time given hope for recovery and progress at this time.  - diurese to goal net negative 2L. - tolerating ECMO turn down without pressors or inotropes  - he is a candidate for ECMO decannulation  - reasonable to maintain ECMO pending further lung and renal recovery  - eventual [facility] to assess coronaries +/- IABP   - cont argastroban for AC while on ECMO  - advanced therapies evaluation premature at this time given hope for recovery and progress at this time.  - diurese to goal net negative 2L. - continue VA ECMO (LFV/RFA, no anterograde perfusion), today weaned from 3.5L/min to 2.8 L/min. Sweep currently at 4 and would favor weaning as tolerates. needs close monitoring of distal pulses  - ECMO wean performed on 11/30 and favorable, we would favor LHC followed by decannulation to IABP +/- inotropes. constant LV loading is worsening pulmonary edema.  - eventual [facility] to assess coronaries +/- IABP pending improvement in renal function   - cont argastroban for AC while on ECMO  - advanced therapies evaluation premature at this time given hope for recovery and progress at this time.  - diurese to goal net negative 2L. - continue VA ECMO (LFV/RFA, no anterograde perfusion), today weaned from 3.5L/min to 2.8 L/min. Sweep currently at 4 and would favor weaning as tolerates. needs close monitoring of distal pulses  - ECMO wean performed on 11/30 and favorable, we would favor LHC followed by decannulation to IABP +/- inotropes. constant LV loading is worsening pulmonary edema.  - eventual [facility] to assess coronaries and IABP pending improvement in renal function   - cont argastroban for AC while on ECMO  - advanced therapies evaluation premature at this time given hope for recovery and progress at this time.  - diurese to goal net negative 2L.

## 2022-11-30 NOTE — PROGRESS NOTE ADULT - ASSESSMENT
63 y/o male with PMH of CABG, DM, HTN, HLD presents with abdominal pain, diarrhea and n/v for 1 day. Found to have acute cholecystitis, gallstone pancreatitis. Now with resp failure intubated on ECMO    Plan:    - No surgical intervention plans   - consider cath when stable  - intubated, on pressors  - ECMO  - hep gtt  - NPO/IVF  - IV abx  - Pain control PRN  - continue excellent care per SICU   61 y/o male with PMH of CABG, DM, HTN, HLD presents with abdominal pain, diarrhea and n/v for 1 day. Found to have acute cholecystitis, gallstone pancreatitis. Now with resp failure intubated on ECMO    Plan:    - No surgical intervention plans   - consider cath when stable  - intubated, on pressors  - ECMO  - hep gtt  - NPO/IVF  - IV abx  - Pain control PRN  - continue excellent care per SICU   61 y/o male with PMH of CABG, DM, HTN, HLD presents with abdominal pain, diarrhea and n/v for 1 day. Found to have acute cholecystitis, gallstone pancreatitis. Now with resp failure intubated on ECMO    Plan:  - no acute surgical intervention at this time  - continue non operative management for acute cholecystitis with gallstone pancreatitis, possible ERCP once stable  - please call with any questions    Surgery  [id number]   61 y/o male with PMH of CABG, DM, HTN, HLD presents with abdominal pain, diarrhea and n/v for 1 day. Found to have acute cholecystitis, gallstone pancreatitis. Now with resp failure intubated on ECMO    Plan:  - no acute surgical intervention at this time  - continue non operative management for acute cholecystitis with gallstone pancreatitis, possible ERCP once stable  - please call with any questions    Surgery  [id number]   61 y/o male with PMH of CABG, DM, HTN, HLD presents with abdominal pain, diarrhea and n/v for 1 day. Found to have acute cholecystitis, gallstone pancreatitis. Now with resp failure intubated on ECMO    Plan:  - no acute surgical intervention at this time  - continue non operative management for acute cholecystitis with gallstone pancreatitis, possible ERCP once stable  - please call with any questions    Surgery  [id number]

## 2022-11-30 NOTE — PROGRESS NOTE ADULT - PROBLEM SELECTOR PLAN 5
- likely arrest associated ATN   - UOP is good, urinated 4L over last 24hr, Cr continues improving  - no indication for dialysis  - renal following

## 2022-11-30 NOTE — PROGRESS NOTE ADULT - ATTENDING COMMENTS
I performed ECMO turndown today and results are in a separate note. His MAP does drop to 62 mmHg on 1.4 L/min of flow but not on any vasopressor/inotropes. Reassuringly his pulsatility remains ideal, filling pressures remain normal, and qualiatative LV function and VTI on echo improves. I believe he would be able to tolerate transition of ECMO to IABP +/- inotropesand a secondary benefit would be less loading of his LV which is likely worsening his pulmonary edema.     Tentative plan is for C in next 1-2 days. As above would favor decannulation of ECMO to IABP +/- inotropes soon. Continue diuresis. Wean steroids.    Case discussed with CTU, CTS, and patient's daughter I performed ECMO turndown today and results are in a separate note. His MAP does drop to 62 mmHg on 1.4 L/min of flow but not on any vasopressor/inotropes. Reassuringly his pulsatility remains ideal, filling pressures remain normal, and qualiatative LV function and VTI on echo improves. I believe he would be able to tolerate transition of ECMO to IABP +/- inotropes and a secondary benefit would be less loading of his LV which is likely worsening his pulmonary edema.     Tentative plan is for LHC in next 1-2 days with placement of IABP for venting and eventual weaning. As above would favor decannulation of ECMO soon. Continue diuresis. Wean steroids.    Case discussed with CTU, CTS, and patient's daughter

## 2022-11-30 NOTE — PROGRESS NOTE ADULT - ASSESSMENT
62 year old admitted with pancreatitis  not necrotizing on the CT  scan and cholecystitis .  pt has sent to the SICU but developed respiratory  failure presumed to be due to ARDS also with hs of CAD<CABG<DM and possible MI  Movedto the CTU for echo      Fever  not on CAVHD  cultures are negative and ct looks like ARDS.  no fever for the last 48 yrs  legs are viable    ARDS  likely all problems related to pancreatitis .  agree with short 7 8 day course of meropenem despite the lack of necrosis on his ct scan       CHF  on ecmo

## 2022-11-30 NOTE — PROGRESS NOTE ADULT - SUBJECTIVE AND OBJECTIVE BOX
Subjective:    no acute vents overnight  ECMO was 3300 RPM reduced to 3000RPM ~2.5LPM  follows commands, perfusion markers stable  CXR continues to have diffuse opacities  renal function continues to improve, UOP is good    Performed turn down study at bedside down to 1.45LPM of ECMO support , filling pressures remained stable, MAP went down to 65 (no pressors) and TTE showed LVEF ~45%m LVOT VTI 15cm    Medications:  albumin human 25% IVPB 50 milliLiter(s) IV Intermittent every 6 hours  argatroban Infusion 0.2 MICROgram(s)/kG/Min IV Continuous <Continuous>  artificial  tears Solution 1 Drop(s) Both EYES four times a day  atorvastatin 40 milliGRAM(s) Oral at bedtime  buMETAnide Injectable 2 milliGRAM(s) IV Push every 12 hours  calcium gluconate IVPB 2 Gram(s) IV Intermittent once  chlorhexidine 0.12% Liquid 15 milliLiter(s) Oral Mucosa every 12 hours  chlorhexidine 2% Cloths 1 Application(s) Topical <User Schedule>  dexMEDEtomidine Infusion 1.5 MICROgram(s)/kG/Hr IV Continuous <Continuous>  dextrose 50% Injectable 50 milliLiter(s) IV Push every 15 minutes  hydrocortisone sodium succinate Injectable 25 milliGRAM(s) IV Push every 12 hours  insulin regular Infusion 6 Unit(s)/Hr IV Continuous <Continuous>  meropenem  IVPB 500 milliGRAM(s) IV Intermittent every 12 hours  niCARdipine Infusion 2 mG/Hr IV Continuous <Continuous>  pantoprazole  Injectable 40 milliGRAM(s) IV Push two times a day  polyethylene glycol 3350 17 Gram(s) Oral daily  potassium chloride   Solution 20 milliEquivalent(s) Oral once  senna 2 Tablet(s) Oral at bedtime  sodium chloride 0.9% lock flush 10 milliLiter(s) IV Push every 1 hour PRN  sodium chloride 0.9%. 1000 milliLiter(s) IV Continuous <Continuous>      Physical Exam:    Vitals:  Vital Signs Last 24 Hours  T(C): 36 (22 @ 08:00), Max: 37.3 (22 @ 08:00)  HR: 69 (22 @ 14:00) (60 - 72)  BP: 110-120/50s  MAP 75  RR: 19 (22 @ 14:00) (14 - 29)  SpO2: 100% (22 @ 14:00) (100% - 100%)    Weight in k.2 ( @ 00:00)    I&O's Summary    2022 07:01  -  2022 07:00  --------------------------------------------------------  IN: 3453.2 mL / OUT: 4750 mL / NET: -1296.8 mL    2022 07:01  -  2022 14:19  --------------------------------------------------------  IN: 739.1 mL / OUT: 1930 mL / NET: -1190.9 mL        Tele:    RASS -1, neck supple  heart RRR s1s2 no m   Lungs coarse b/l   Abd soft ND  Ext warm no edema  ECMO cannulas in place (Rt Art, Lt Kevin)    Labs:                        8.0    13.10 )-----------( 93       ( 2022 00:43 )             24.6         154<H>  |  122<H>  |  69<H>  ----------------------------<  137<H>  3.6   |  18<L>  |  3.02<H>    Ca    7.9<L>      2022 13:10  Phos  2.3       Mg     2.9         TPro  5.8<L>  /  Alb  3.2<L>  /  TBili  0.8  /  DBili  x   /  AST  38  /  ALT  10  /  AlkPhos  79  30    PT/INR - ( 2022 07:05 )   PT: 16.1 sec;   INR: 1.38 ratio         PTT - ( 2022 07:05 )  PTT:50.4 sec  CARDIAC MARKERS ( 2022 00:26 )  x     / x     / 162 U/L / x     / 4.3 ng/mL      Serum Pro-Brain Natriuretic Peptide: 330 pg/mL ( @ 07:57)  Creatine Kinase, Serum: 162 U/L (22 @ 00:26)  Creatine Kinase, Serum: 426 U/L (22 @ 00:58)  Creatine Kinase, Serum: 162 U/L (22 @ 00:26)  Creatine Kinase, Serum: 426 U/L (22 @ 00:58)    Oxygen Saturation, Mixed: 75.9 ( @ 06:03)  Oxygen Saturation, Mixed: 79.4 ( @ 02:50)  Oxygen Saturation, Mixed: 78.6 ( @ 00:00)  Oxygen Saturation, Mixed: 74.1 ( @ 19:56)  Oxygen Saturation, Mixed: 75.8 ( @ 15:06)    Lactate Dehydrogenase, Serum: 527 U/L ( @ 00:43)  Lactate Dehydrogenase, Serum: 650 U/L ( @ 00:26)  Lactate Dehydrogenase, Serum: 974 U/L ( @ 00:58)

## 2022-11-30 NOTE — CHART NOTE - NSCHARTNOTEFT_GEN_A_CORE
ECMO turndown 11/30/22    Baseline:  ECMO flow 2.8 L/min  /56 (MAP 75)  HR 78  RA 3  PA 31/10  LV 5.6 cm  LVEF 30-35%  Normal RV function  LVOT VTI 13 cm    ECMO flow 1.4 L/min  HR 72  /46 (MAP 63)  RA 4  PA 33/8  LV 5.3 cm  LVEF 35-40%  LVOT VTI 15 cm

## 2022-11-30 NOTE — PROGRESS NOTE ADULT - SUBJECTIVE AND OBJECTIVE BOX
infectious diseases progress note:    Patient is a 62y old  Male who presents with a chief complaint of Acute cholecystitis, gallstone pancreatitis (30 Nov 2022 07:49)        Acute pancreatitis without infection or necrosis             Allergies    No Known Allergies    Intolerances        ANTIBIOTICS/RELEVANT:  antimicrobials  meropenem  IVPB 500 milliGRAM(s) IV Intermittent every 12 hours    immunologic:    OTHER:  argatroban Infusion 0.25 MICROgram(s)/kG/Min IV Continuous <Continuous>  artificial  tears Solution 1 Drop(s) Both EYES four times a day  atorvastatin 40 milliGRAM(s) Oral at bedtime  chlorhexidine 0.12% Liquid 15 milliLiter(s) Oral Mucosa every 12 hours  chlorhexidine 2% Cloths 1 Application(s) Topical <User Schedule>  dexMEDEtomidine Infusion 1.5 MICROgram(s)/kG/Hr IV Continuous <Continuous>  dextrose 50% Injectable 50 milliLiter(s) IV Push every 15 minutes  hydrocortisone sodium succinate Injectable 25 milliGRAM(s) IV Push every 8 hours  insulin regular Infusion 6 Unit(s)/Hr IV Continuous <Continuous>  niCARdipine Infusion 2 mG/Hr IV Continuous <Continuous>  pantoprazole  Injectable 40 milliGRAM(s) IV Push two times a day  polyethylene glycol 3350 17 Gram(s) Oral daily  senna 2 Tablet(s) Oral at bedtime  sodium chloride 0.9% lock flush 10 milliLiter(s) IV Push every 1 hour PRN  sodium chloride 0.9%. 1000 milliLiter(s) IV Continuous <Continuous>      Objective:  Vital Signs Last 24 Hrs  T(C): 37.1 (30 Nov 2022 04:00), Max: 37.2 (30 Nov 2022 00:00)  T(F): 98.8 (30 Nov 2022 04:00), Max: 99 (30 Nov 2022 00:00)  HR: 66 (30 Nov 2022 07:00) (60 - 79)  BP: --  BP(mean): --  RR: 17 (30 Nov 2022 07:00) (14 - 32)  SpO2: 100% (30 Nov 2022 07:00) (98% - 100%)    Parameters below as of 30 Nov 2022 04:00  Patient On (Oxygen Delivery Method): ventilator    O2 Concentration (%): 50       Eyes:INOCENCIO, EOMI  Ear/Nose/Throat: no oral lesion, no sinus tenderness on percussion	  Neck:no JVD, no lymphadenopathy, supple  Respiratory: CTA lore  Cardiovascular: S1S2 RRR, no murmurs  Gastrointestinal:soft, (+) BS, no HSM  Extremities:no e/e/c        LABS:                        8.0    13.10 )-----------( 93       ( 30 Nov 2022 00:43 )             24.6     11-30    155<H>  |  123<H>  |  67<H>  ----------------------------<  139<H>  4.1   |  19<L>  |  3.10<H>    Ca    8.0<L>      30 Nov 2022 00:43  Phos  2.3     11-30  Mg     2.9     11-30    TPro  5.9<L>  /  Alb  3.2<L>  /  TBili  0.8  /  DBili  x   /  AST  42<H>  /  ALT  7<L>  /  AlkPhos  70  11-30    PT/INR - ( 30 Nov 2022 07:05 )   PT: 16.1 sec;   INR: 1.38 ratio         PTT - ( 30 Nov 2022 07:05 )  PTT:50.4 sec        MICROBIOLOGY:    RECENT CULTURES:  11-28 @ 15:24 .Blood Blood-Peripheral                No growth to date.    11-26 @ 18:11 ET Tube ET Tube       Moderate polymorphonuclear leukocytes per low power field  No Squamous epithelial cells per low power field  No organisms seen per oil power field           No growth at 48 hours    11-25 @ 22:55 ET Tube ET Tube       No polymorphonuclear leukocytes seen per low power field  No Squamous epithelial cells seen per low power field  No organisms seen per oil power field           No growth at 48 hours    11-25 @ 22:10 .Blood Blood-Peripheral                No growth to date.    11-25 @ 21:15 .Blood Blood-Peripheral                No growth to date.    11-25 @ 10:39 .Blood Blood-Peripheral                No growth to date.    11-25 @ 10:37 .Blood Blood-Peripheral                No growth to date.          RESPIRATORY CULTURES:              RADIOLOGY & ADDITIONAL STUDIES:        Pager [phone]  After 5 pm/weekends or if no response :[phone] infectious diseases progress note:    Patient is a 62y old  Male who presents with a chief complaint of Acute cholecystitis, gallstone pancreatitis (30 Nov 2022 07:49)        Acute pancreatitis without infection or necrosis             Allergies    No Known Allergies    Intolerances        ANTIBIOTICS/RELEVANT:  antimicrobials  meropenem  IVPB 500 milliGRAM(s) IV Intermittent every 12 hours    immunologic:    OTHER:  argatroban Infusion 0.25 MICROgram(s)/kG/Min IV Continuous <Continuous>  artificial  tears Solution 1 Drop(s) Both EYES four times a day  atorvastatin 40 milliGRAM(s) Oral at bedtime  chlorhexidine 0.12% Liquid 15 milliLiter(s) Oral Mucosa every 12 hours  chlorhexidine 2% Cloths 1 Application(s) Topical <User Schedule>  dexMEDEtomidine Infusion 1.5 MICROgram(s)/kG/Hr IV Continuous <Continuous>  dextrose 50% Injectable 50 milliLiter(s) IV Push every 15 minutes  hydrocortisone sodium succinate Injectable 25 milliGRAM(s) IV Push every 8 hours  insulin regular Infusion 6 Unit(s)/Hr IV Continuous <Continuous>  niCARdipine Infusion 2 mG/Hr IV Continuous <Continuous>  pantoprazole  Injectable 40 milliGRAM(s) IV Push two times a day  polyethylene glycol 3350 17 Gram(s) Oral daily  senna 2 Tablet(s) Oral at bedtime  sodium chloride 0.9% lock flush 10 milliLiter(s) IV Push every 1 hour PRN  sodium chloride 0.9%. 1000 milliLiter(s) IV Continuous <Continuous>      Objective:  Vital Signs Last 24 Hrs  T(C): 37.1 (30 Nov 2022 04:00), Max: 37.2 (30 Nov 2022 00:00)  T(F): 98.8 (30 Nov 2022 04:00), Max: 99 (30 Nov 2022 00:00)  HR: 66 (30 Nov 2022 07:00) (60 - 79)  BP: --  BP(mean): --  RR: 17 (30 Nov 2022 07:00) (14 - 32)  SpO2: 100% (30 Nov 2022 07:00) (98% - 100%)    Parameters below as of 30 Nov 2022 04:00  Patient On (Oxygen Delivery Method): ventilator    O2 Concentration (%): 50       Eyes:INOCENCIO, EOMI  Ear/Nose/Throat: no oral lesion, no sinus tenderness on percussion	  Neck:no JVD, no lymphadenopathy, supple  Respiratory: CTA lore  Cardiovascular: S1S2 RRR, no murmurs  Gastrointestinal:soft, (+) BS, no HSM  Extremities:no e/e/c        LABS:                        8.0    13.10 )-----------( 93       ( 30 Nov 2022 00:43 )             24.6     11-30    155<H>  |  123<H>  |  67<H>  ----------------------------<  139<H>  4.1   |  19<L>  |  3.10<H>    Ca    8.0<L>      30 Nov 2022 00:43  Phos  2.3     11-30  Mg     2.9     11-30    TPro  5.9<L>  /  Alb  3.2<L>  /  TBili  0.8  /  DBili  x   /  AST  42<H>  /  ALT  7<L>  /  AlkPhos  70  11-30    PT/INR - ( 30 Nov 2022 07:05 )   PT: 16.1 sec;   INR: 1.38 ratio         PTT - ( 30 Nov 2022 07:05 )  PTT:50.4 sec        MICROBIOLOGY:    RECENT CULTURES:  11-28 @ 15:24 .Blood Blood-Peripheral                No growth to date.    11-26 @ 18:11 ET Tube ET Tube       Moderate polymorphonuclear leukocytes per low power field  No Squamous epithelial cells per low power field  No organisms seen per oil power field           No growth at 48 hours    11-25 @ 22:55 ET Tube ET Tube       No polymorphonuclear leukocytes seen per low power field  No Squamous epithelial cells seen per low power field  No organisms seen per oil power field           No growth at 48 hours    11-25 @ 22:10 .Blood Blood-Peripheral                No growth to date.    11-25 @ 21:15 .Blood Blood-Peripheral                No growth to date.    11-25 @ 10:39 .Blood Blood-Peripheral                No growth to date.    11-25 @ 10:37 .Blood Blood-Peripheral                No growth to date.          RESPIRATORY CULTURES:              RADIOLOGY & ADDITIONAL STUDIES:        Pager [phone]  After 5 pm/weekends or if no response :[phone] infectious diseases progress note:    Patient is a 62y old  Male who presents with a chief complaint of Acute cholecystitis, gallstone pancreatitis (30 Nov 2022 07:49)        Acute pancreatitis without infection or necrosis             Allergies    No Known Allergies    Intolerances        ANTIBIOTICS/RELEVANT:  antimicrobials  meropenem  IVPB 500 milliGRAM(s) IV Intermittent every 12 hours    immunologic:    OTHER:  argatroban Infusion 0.25 MICROgram(s)/kG/Min IV Continuous <Continuous>  artificial  tears Solution 1 Drop(s) Both EYES four times a day  atorvastatin 40 milliGRAM(s) Oral at bedtime  chlorhexidine 0.12% Liquid 15 milliLiter(s) Oral Mucosa every 12 hours  chlorhexidine 2% Cloths 1 Application(s) Topical <User Schedule>  dexMEDEtomidine Infusion 1.5 MICROgram(s)/kG/Hr IV Continuous <Continuous>  dextrose 50% Injectable 50 milliLiter(s) IV Push every 15 minutes  hydrocortisone sodium succinate Injectable 25 milliGRAM(s) IV Push every 8 hours  insulin regular Infusion 6 Unit(s)/Hr IV Continuous <Continuous>  niCARdipine Infusion 2 mG/Hr IV Continuous <Continuous>  pantoprazole  Injectable 40 milliGRAM(s) IV Push two times a day  polyethylene glycol 3350 17 Gram(s) Oral daily  senna 2 Tablet(s) Oral at bedtime  sodium chloride 0.9% lock flush 10 milliLiter(s) IV Push every 1 hour PRN  sodium chloride 0.9%. 1000 milliLiter(s) IV Continuous <Continuous>      Objective:  Vital Signs Last 24 Hrs  T(C): 37.1 (30 Nov 2022 04:00), Max: 37.2 (30 Nov 2022 00:00)  T(F): 98.8 (30 Nov 2022 04:00), Max: 99 (30 Nov 2022 00:00)  HR: 66 (30 Nov 2022 07:00) (60 - 79)  BP: --  BP(mean): --  RR: 17 (30 Nov 2022 07:00) (14 - 32)  SpO2: 100% (30 Nov 2022 07:00) (98% - 100%)    Parameters below as of 30 Nov 2022 04:00  Patient On (Oxygen Delivery Method): ventilator    O2 Concentration (%): 50       Eyes:INOCENCIO, EOMI  Ear/Nose/Throat: no oral lesion, no sinus tenderness on percussion	  Neck:no JVD, no lymphadenopathy, supple  Respiratory: CTA lore  Cardiovascular: S1S2 RRR, no murmurs  Gastrointestinal:soft, (+) BS, no HSM  Extremities:no e/e/c        LABS:                        8.0    13.10 )-----------( 93       ( 30 Nov 2022 00:43 )             24.6     11-30    155<H>  |  123<H>  |  67<H>  ----------------------------<  139<H>  4.1   |  19<L>  |  3.10<H>    Ca    8.0<L>      30 Nov 2022 00:43  Phos  2.3     11-30  Mg     2.9     11-30    TPro  5.9<L>  /  Alb  3.2<L>  /  TBili  0.8  /  DBili  x   /  AST  42<H>  /  ALT  7<L>  /  AlkPhos  70  11-30    PT/INR - ( 30 Nov 2022 07:05 )   PT: 16.1 sec;   INR: 1.38 ratio         PTT - ( 30 Nov 2022 07:05 )  PTT:50.4 sec        MICROBIOLOGY:    RECENT CULTURES:  11-28 @ 15:24 .Blood Blood-Peripheral                No growth to date.    11-26 @ 18:11 ET Tube ET Tube       Moderate polymorphonuclear leukocytes per low power field  No Squamous epithelial cells per low power field  No organisms seen per oil power field           No growth at 48 hours    11-25 @ 22:55 ET Tube ET Tube       No polymorphonuclear leukocytes seen per low power field  No Squamous epithelial cells seen per low power field  No organisms seen per oil power field           No growth at 48 hours    11-25 @ 22:10 .Blood Blood-Peripheral                No growth to date.    11-25 @ 21:15 .Blood Blood-Peripheral                No growth to date.    11-25 @ 10:39 .Blood Blood-Peripheral                No growth to date.    11-25 @ 10:37 .Blood Blood-Peripheral                No growth to date.          RESPIRATORY CULTURES:              RADIOLOGY & ADDITIONAL STUDIES:        Pager [phone]  After 5 pm/weekends or if no response :[phone]

## 2022-11-30 NOTE — PROCEDURE NOTE - NSBRONCHFINDINGS_GEN_A_CORE_FT
Indication: secretions, CXR findings      History: day 4 [facility] ECMO placement s/p PEA arrest      Findings:  Bronchoscope inserted through ETT. ETT noted to be in good position. Airway evaluation revealed Sharp Maggie. ANNABEL and LLL evaluation revealed erythema and minimal secretions . RUL, RML, RLL revealed erythema and minimal secretions. Bronchoscope then withdrawn from ETT. Minimal bleeding noted     Specimens:  BAL sent    post vent recruitment maneuvers performed and  CXR pending post bronch

## 2022-11-30 NOTE — PROGRESS NOTE ADULT - PROBLEM SELECTOR PLAN 1
ERIC/ ATN in the setting of STEMI, cardiac arrest & cardiogenic shock      SCr on arrival was 2.15; trended down to hector 1.6 on 11/25;  slowly trended up & peaked to 3.95 11/28; now trending down to 3.1 today. Started on bumex 2 mg IV bid today.  Pt is non oliguric UOP 4.7L in 24hrs with net negative -1.2L    UA with moderate blood & few RBCs, few WBCs, mild proteinuria, high sp gravity. Please check CPK, urine electrolytes, spot urine TP/CR. Would check Renal US when stable. Maintain De Dios. Might need inotropic support.  Continue to optimize hemodynamics. No indication for dialysis at this time. Possible plan for LHC and possible IABP for venting per HF.  Monitor labs and urine output. Avoid NSAIDs, RCA and nephrotoxins. Dose medications as per eGFR.

## 2022-11-30 NOTE — PROGRESS NOTE ADULT - PROBLEM SELECTOR PLAN 2
likely from poor free water access & loop diuretics.     4.5L free water deficit. Replace half in 24 hrs. Would start 300ml free water q6 hrs via NGT. If need loops for diuresis then can add metolazone 10mg to treat the hyponatremia & volume. Check Uosm to look for DI      If you have any questions, please feel free to contact me via [employer] teams  Marielle Santos  Nephrology Fellow   Pager NS: [phone]/ [facility]: [phone]    (After 5 pm or on weekends please page the on-call fellow, can check [email or web address] for schedule. Login is carmen cantor, schedule under [facility] medicine, psych, derm). likely from poor free water access & loop diuretics.     4.5L free water deficit. Replace half in 24 hrs. Would start 300ml free water q6 hrs via NGT. If need loops for diuresis then can add metolazone 10mg to treat the hyponatremia & volume. Check Uosm to look for DI      If you have any questions, please feel free to contact me via [employer] teams  Marielle Santos  Nephrology Fellow   Pager NS: [phone]/ [facility]: [phone]    (After 5 pm or on weekends please page the on-call fellow, can check [email or web address] for schedule. Login is carmen cantor, schedule under [facility] medicine, psych, derm). likely from poor free water access & loop diuretics.     4.5L free water deficit. Replace half in 24 hrs. Would start 300ml free water q6 hrs via NGT. If need loops for diuresis then can add metolazone 10mg to treat the hyponatremia & volume. Check Uosm to look for DI      If you have any questions, please feel free to contact me via [employer] teams  Marielle Santos  Nephrology Fellow   Pager NS: [phone]/ [facility]: [phone]    (After 5 pm or on weekends please page the on-call fellow, can check [email or web address] for schedule. Login is carmen cantor, schedule under [facility] medicine, psych, derm). likely from poor free water access & loop diuretics.     4.5L free water deficit. Would start 300ml free water q6 hrs via NGT. If need loops for diuresis then can add metolazone 10mg to treat the hypernatremia & volume. Check Uosm to look for DI      If you have any questions, please feel free to contact me via [employer] teams  Marielle Santos  Nephrology Fellow   Pager NS: [phone]/ [facility]: [phone]    (After 5 pm or on weekends please page the on-call fellow, can check [email or web address] for schedule. Login is carmen cantor, schedule under [facility] medicine, psych, derm). likely from poor free water access & loop diuretics.     4.5L free water deficit. Would start 300ml free water q6 hrs via NGT. If need loops for diuresis then can add metolazone 10mg to treat the hypernatremia & volume. Check Uosm to look for DI      If you have any questions, please feel free to contact me via [employer] teams  Marielle Santos  Nephrology Fellow   Pager NS: [phone]/ [facility]: [phone]    (After 5 pm or on weekends please page the on-call fellow, can check [email or web address] for schedule. Login is carmen cantor, schedule under [facility] medicine, psych, derm). likely from poor free water access & loop diuretics.     4.5L free water deficit. Would start 300ml free water q6 hrs via NGT. If need loops for diuresis then can add metolazone 10mg to treat the hypernatremia & volume. Check Uosm to look for DI      If you have any questions, please feel free to contact me via [employer] teams  Marielle Santos  Nephrology Fellow   Pager NS: [phone]/ [facility]: [phone]    (After 5 pm or on weekends please page the on-call fellow, can check [email or web address] for schedule. Login is carmen cantor, schedule under [facility] medicine, psych, derm).

## 2022-11-30 NOTE — PROGRESS NOTE ADULT - ASSESSMENT
61 YO M with a history of CAD s/p 4v CABG ~2019 in [facility], DM2 (A1c 7.3%), and HTN who initially presented to OSH with abdominal pain and n/v and found to have pancreatitis with lipase > 3000 though also with elevated troponins. CT abdomen revealed acute pancreatitis and his initial LVEF was 40-45%. He developed MSOF with hypoxic respiratory failure requiring intubation and ERIC and went into hypoxic PEA arrest requiring ACLS and due to persistent hypoxia and hypotension on pressors after ROSC was cannulated to [facility] (LFV, RFA, no anterograde perfusion catheter) on 11/25. Notably CTH that day revealed small subdural hemorrhage.     His post arrest TTE on 11/26 showed a signficantly worse LVEF of 5-10% with an AV that was only minimally opening. Followup TTE on 11/28 continues to show significant LV dysfunction but some mild qualitative improvement. He reassuringly has ? 40 mmHg pulsatility despite full ECMO support. There is likely some degree of stunning post arrest and will continue to slowly wean ECMO and assess for recovery.     Turn down performed 11/30 1pm   at ECMO 1.45LPM support, filling pressures did not change significantly, MVO2 69%, on TTE LVEF was ~45% with LVOT VTI 15cm   63 YO M with a history of CAD s/p 4v CABG ~2019 in [facility], DM2 (A1c 7.3%), and HTN who initially presented to OSH with abdominal pain and n/v and found to have pancreatitis with lipase > 3000 though also with elevated troponins. CT abdomen revealed acute pancreatitis and his initial LVEF was 40-45%. He developed MSOF with hypoxic respiratory failure requiring intubation and ERIC and went into hypoxic PEA arrest requiring ACLS and due to persistent hypoxia and hypotension on pressors after ROSC was cannulated to [facility] ECM[facility] (LFV, RFA, no anterograde perfusion catheter) on 11/25. Notably CTH that day revealed small subdural hemorrhage.     His post arrest TTE on 11/26 showed a signficantly worse LVEF of 5-10% with an AV that was only minimally opening. Followup TTE on 11/28 continues to show significant LV dysfunction but some mild qualitative improvement. He reassuringly has ? 40 mmHg pulsatility despite full ECMO support. There is likely some degree of stunning post arrest and will continue to slowly wean ECMO and assess for recovery.     Turn down performed 11/30 1pm   at ECMO 1.45LPM support, filling pressures did not change significantly, MVO2 69%, on TTE LVEF was ~45% with LVOT VTI 15cm   63 YO M with a history of CAD s/p 4v CABG ~2019 in [facility], DM2 (A1c 7.3%), and HTN who initially presented to OSH with abdominal pain and n/v and found to have pancreatitis with lipase > 3000 though also with elevated troponins. CT abdomen revealed acute pancreatitis and his initial LVEF was 40-45%. He developed MSOF with hypoxic respiratory failure requiring intubation and ERIC and went into hypoxic PEA arrest requiring ACLS and due to persistent hypoxia and hypotension on pressors after ROSC was cannulated to [facility] (LFV, RFA, no anterograde perfusion catheter) on 11/25. Notably CTH that day revealed small subdural hemorrhage.     His post arrest TTE on 11/26 showed a signficantly worse LVEF of 5-10% with an AV that was only minimally opening. Followup TTE on 11/28 continues to show significant LV dysfunction but some mild qualitative improvement. He reassuringly has ? 40 mmHg pulsatility despite full ECMO support. There is likely some degree of stunning post arrest and will continue to slowly wean ECMO and assess for recovery.     Turn down performed 11/30 1pm   at ECMO 1.45LPM support, filling pressures did not change significantly, MVO2 69%, on TTE LVEF was ~45% with LVOT VTI 15cm   61 YO M with a history of CAD s/p 4v CABG ~2019 in [facility], DM2 (A1c 7.3%), and HTN who initially presented to OSH with abdominal pain and n/v and found to have pancreatitis with lipase > 3000 though also with elevated troponins. CT abdomen revealed acute pancreatitis and his initial LVEF was 40-45%. He developed MSOF with hypoxic respiratory failure requiring intubation and ERIC and went into hypoxic PEA arrest requiring ACLS and due to persistent hypoxia and hypotension on pressors after ROSC was cannulated to [facility] (LFV, RFA, no anterograde perfusion catheter) on 11/25. Notably CTH that day revealed small subdural hemorrhage.     His post arrest TTE on 11/26 showed a signficantly worse LVEF of 5-10% with an AV that was only minimally opening. Since then he has had improvement in his LV function (now ~35-40%) and improved pulsatility while tolerating progressive slow ECMO weans. ECMO turndown (note in chart 11/30) was reassuring for ability to decannulate to IABP/inotropes. He is awaiting [facility] when renal function improving and per CTS are deferring ECMO decannulation at this time.    61 YO M with a history of CAD s/p 4v CABG ~2019 in [facility], DM2 (A1c 7.3%), and HTN who initially presented to OSH with abdominal pain and n/v and found to have pancreatitis with lipase > 3000 though also with elevated troponins. CT abdomen revealed acute pancreatitis and his initial LVEF was 40-45%. He developed MSOF with hypoxic respiratory failure requiring intubation and ERIC and went into hypoxic PEA arrest requiring ACLS and due to persistent hypoxia and hypotension on pressors after ROSC was cannulated to [facility] (LFV, RFA, no anterograde perfusion catheter) on 11/25. Notably CTH that day revealed small subdural hemorrhage.     His post arrest TTE on 11/26 showed a signficantly worse LVEF of 5-10% with an AV that was only minimally opening. Since then he has had improvement in his LV function (now ~35-40%) and improved pulsatility while tolerating progressive slow ECMO weans. ECMO turndown (note in chart 11/30) was reassuring for ability to decannulate to IABP/inotropes. He is awaiting [facility] when renal function improving and per CTS are deferring ECMO decannulation at this time.    61 YO M with a history of CAD s/p 4v CABG ~2019 in [facility], DM2 (A1c 7.3%), and HTN who initially presented to OSH with abdominal pain and n/v and found to have pancreatitis with lipase > 3000 though also with elevated troponins. CT abdomen revealed acute pancreatitis and his initial LVEF was 40-45%. He developed MSOF with hypoxic respiratory failure requiring intubation and ERIC and went into hypoxic PEA arrest requiring ACLS and due to persistent hypoxia and hypotension on pressors after ROSC was cannulated to [facility] ECM[facility] (LFV, RFA, no anterograde perfusion catheter) on 11/25. Notably CTH that day revealed small subdural hemorrhage.     His post arrest TTE on 11/26 showed a signficantly worse LVEF of 5-10% with an AV that was only minimally opening. Since then he has had improvement in his LV function (now ~35-40%) and improved pulsatility while tolerating progressive slow ECMO weans. ECMO turndown (note in chart 11/30) was reassuring for ability to decannulate to IABP/inotropes. He is awaiting [facility] when renal function improving and per CTS are deferring ECMO decannulation at this time.    63 YO M with a history of CAD s/p 4v CABG ~2019 in [facility], DM2 (A1c 7.3%), and HTN who initially presented to OSH with abdominal pain and n/v and found to have pancreatitis with lipase > 3000 though also with elevated troponins. CT abdomen revealed acute pancreatitis and his initial LVEF was 40-45%. He developed MSOF with hypoxic respiratory failure requiring intubation and ERIC and went into hypoxic PEA arrest requiring ACLS and due to persistent hypoxia and hypotension on pressors after ROSC was cannulated to [facility] ECM[facility] (LFV, RFA, no anterograde perfusion catheter) on 11/25. Notably CTH that day revealed small subdural hemorrhage.     His post arrest TTE on 11/26 showed a signficantly worse LVEF of 5-10% with an AV that was only minimally opening. Since then he has had improvement in his LV function (now ~35-40%) and improved pulsatility while tolerating progressive slow ECMO weans. ECMO turndown (note in chart 11/30) was reassuring for ability to decannulate to IABP/inotropes. He is awaiting [facility] (IABP at same time) when renal function improving and per CTS are deferring ECMO decannulation at this time.    63 YO M with a history of CAD s/p 4v CABG ~2019 in [facility], DM2 (A1c 7.3%), and HTN who initially presented to OSH with abdominal pain and n/v and found to have pancreatitis with lipase > 3000 though also with elevated troponins. CT abdomen revealed acute pancreatitis and his initial LVEF was 40-45%. He developed MSOF with hypoxic respiratory failure requiring intubation and ERIC and went into hypoxic PEA arrest requiring ACLS and due to persistent hypoxia and hypotension on pressors after ROSC was cannulated to [facility] (LFV, RFA, no anterograde perfusion catheter) on 11/25. Notably CTH that day revealed small subdural hemorrhage.     His post arrest TTE on 11/26 showed a signficantly worse LVEF of 5-10% with an AV that was only minimally opening. Since then he has had improvement in his LV function (now ~35-40%) and improved pulsatility while tolerating progressive slow ECMO weans. ECMO turndown (note in chart 11/30) was reassuring for ability to decannulate to IABP/inotropes. He is awaiting [facility] (IABP at same time) when renal function improving and per CTS are deferring ECMO decannulation at this time.    63 YO M with a history of CAD s/p 4v CABG ~2019 in [facility], DM2 (A1c 7.3%), and HTN who initially presented to OSH with abdominal pain and n/v and found to have pancreatitis with lipase > 3000 though also with elevated troponins. CT abdomen revealed acute pancreatitis and his initial LVEF was 40-45%. He developed MSOF with hypoxic respiratory failure requiring intubation and ERIC and went into hypoxic PEA arrest requiring ACLS and due to persistent hypoxia and hypotension on pressors after ROSC was cannulated to [facility] (LFV, RFA, no anterograde perfusion catheter) on 11/25. Notably CTH that day revealed small subdural hemorrhage.     His post arrest TTE on 11/26 showed a signficantly worse LVEF of 5-10% with an AV that was only minimally opening. Since then he has had improvement in his LV function (now ~35-40%) and improved pulsatility while tolerating progressive slow ECMO weans. ECMO turndown (note in chart 11/30) was reassuring for ability to decannulate to IABP/inotropes. He is awaiting [facility] (IABP at same time) when renal function improving and per CTS are deferring ECMO decannulation at this time.

## 2022-11-30 NOTE — PROGRESS NOTE ADULT - PROBLEM SELECTOR PLAN 2
- troponin peaked at > 94828 and coming down   - plan for LHC once recovers from ERIC  - restart statin  - ASA when able. - troponin peaked at > 95323 and coming down   - plan for LHC once recovers from ERIC  - restart statin  - ASA when able. - troponin peaked at > 14258 and coming down   - plan for LHC once recovers from ERIC  - restart statin  - ASA when able. - troponin peaked at > 56050 and coming down   - plan for LHC once recovers from ERIC  - continue statin  - would start ASA - troponin peaked at > 71522 and coming down   - plan for LHC once recovers from ERIC  - continue statin  - would start ASA - troponin peaked at > 09850 and coming down   - plan for LHC once recovers from ERIC  - continue statin  - would start ASA - troponin peaked at > 97640 and coming down   - plan for LHC with IABP once recovers from ERIC  - continue statin  - would start ASA - troponin peaked at > 97547 and coming down   - plan for LHC with IABP once recovers from ERIC  - continue statin  - would start ASA - troponin peaked at > 31732 and coming down   - plan for LHC with IABP once recovers from ERIC  - continue statin  - would start ASA

## 2022-11-30 NOTE — PROGRESS NOTE ADULT - SUBJECTIVE AND OBJECTIVE BOX
[facility] Division of Kidney Diseases & Hypertension  FOLLOW UP NOTE  [phone]--------------------------------------------------------------------------------  Chief Complaint:Acute pancreatitis without infection or necrosis      HPI:  63 y/o male with PMH of CABG, DM, HTN, HLD presenting as transfer from [place] for c/f STEMI. Course c/b gallstone pancreatitis leading to ARDS and shock & cardiac arrest now on [facility]O. Had significant troponin elevation and his LVEF down to 8%. Pt was deemed to be too unstable to have an angiogram and potential PCI due to his co-morbidities & a new subdural bleed. Nephrology called for ERIC. SCr on arrival was 2.15; trended down to hector 1.6 on 11/25; slowly trended up & peaked to 3.95 11/28; now trending down to 3.1 today. Pt is non oliguric.         Patient seen & examined. Remains on [facility] ECMO. CVP 2. Back on nicardipine. Off Milrinone now.  Started on bumex 2 mg IV bid today. UOP 4.7L in 24hrs with net negative -1.2L              PAST HISTORY  --------------------------------------------------------------------------------  No significant changes to PMH, PSH, FHx, SHx, unless otherwise noted    ALLERGIES & MEDICATIONS  --------------------------------------------------------------------------------  Allergies    No Known Allergies    Intolerances      Standing Inpatient Medications  albumin human 25% IVPB 50 milliLiter(s) IV Intermittent every 6 hours  argatroban Infusion 0.25 MICROgram(s)/kG/Min IV Continuous <Continuous>  artificial  tears Solution 1 Drop(s) Both EYES four times a day  atorvastatin 40 milliGRAM(s) Oral at bedtime  buMETAnide Injectable 2 milliGRAM(s) IV Push every 12 hours  chlorhexidine 0.12% Liquid 15 milliLiter(s) Oral Mucosa every 12 hours  chlorhexidine 2% Cloths 1 Application(s) Topical <User Schedule>  dexMEDEtomidine Infusion 1.5 MICROgram(s)/kG/Hr IV Continuous <Continuous>  dextrose 50% Injectable 50 milliLiter(s) IV Push every 15 minutes  hydrocortisone sodium succinate Injectable 25 milliGRAM(s) IV Push every 12 hours  insulin regular Infusion 6 Unit(s)/Hr IV Continuous <Continuous>  meropenem  IVPB 500 milliGRAM(s) IV Intermittent every 12 hours  niCARdipine Infusion 2 mG/Hr IV Continuous <Continuous>  pantoprazole  Injectable 40 milliGRAM(s) IV Push two times a day  polyethylene glycol 3350 17 Gram(s) Oral daily  senna 2 Tablet(s) Oral at bedtime  sodium chloride 0.9%. 1000 milliLiter(s) IV Continuous <Continuous>    PRN Inpatient Medications  sodium chloride 0.9% lock flush 10 milliLiter(s) IV Push every 1 hour PRN      REVIEW OF SYSTEMS  --------------------------------------------------------------------------------  unable to obtain      VITALS/PHYSICAL EXAM  --------------------------------------------------------------------------------  T(C): 36 (11-30-22 @ 08:00), Max: 37.3 (11-30-22 @ 08:00)  HR: 69 (11-30-22 @ 09:09) (60 - 73)  BP: --  RR: 21 (11-30-22 @ 09:00) (14 - 32)  SpO2: 100% (11-30-22 @ 09:09) (100% - 100%)  Wt(kg): --        11-29-22 @ 07:01  -  11-30-22 @ 07:00  --------------------------------------------------------  IN: 3453.2 mL / OUT: 4750 mL / NET: -1296.8 mL    11-30-22 @ 07:01  -  11-30-22 @ 10:43  --------------------------------------------------------  IN: 125.8 mL / OUT: 690 mL / NET: -564.2 mL      Physical Exam:  	Gen: intubated  	HEENT: +ET  	Pulm: CTA B/L  	CV: S1S2, no rub  	Abd: Soft, +BS          + presacral edema  	Ext: + LE edema B/L  	Neuro: sedated  	Skin: Warm and dry  	Vascular access: ECMO              +De Dios with red urine (Cyanokit)      LABS/STUDIES  --------------------------------------------------------------------------------              8.0    13.10 >-----------<  93       [11-30-22 @ 00:43]              24.6     155  |  123  |  67  ----------------------------<  139      [11-30-22 @ 00:43]  4.1   |  19  |  3.10        Ca     8.0     [11-30-22 @ 00:43]      Mg     2.9     [11-30-22 @ 00:43]      Phos  2.3     [11-30-22 @ 00:43]    TPro  5.9  /  Alb  3.2  /  TBili  0.8  /  DBili  x   /  AST  42  /  ALT  7   /  AlkPhos  70  [11-30-22 @ 00:43]    PT/INR: PT 16.1 , INR 1.38       [11-30-22 @ 07:05]  PTT: 50.4       [11-30-22 @ 07:05]          [11-29-22 @ 00:26]        [11-30-22 @ 00:43]    Creatinine Trend:  SCr 3.10 [11-30 @ 00:43]  SCr 3.20 [11-29 @ 18:28]  SCr 3.45 [11-29 @ 00:26]  SCr 3.95 [11-28 @ 00:58]  SCr 3.81 [11-27 @ 00:33]    Urinalysis - [11-25-22 @ 10:37]      Color Yellow / Appearance Slightly Turbid / SG 1.027 / pH 6.0      Gluc Negative / Ketone Trace  / Bili Negative / Urobili Negative       Blood Moderate / Protein 30 mg/dL / Leuk Est Negative / Nitrite Negative      RBC 7 / WBC 7 / Hyaline 4 / Gran  / Sq Epi  / Non Sq Epi 2 / Bacteria Negative      TSH 0.60      [11-26-22 @ 05:24]  Lipid: chol --, , HDL --, LDL --      [11-26-22 @ 20:11]       [facility] Division of Kidney Diseases & Hypertension  FOLLOW UP NOTE  [phone]--------------------------------------------------------------------------------  Chief Complaint:Acute pancreatitis without infection or necrosis      HPI:  63 y/o male with PMH of CABG, DM, HTN, HLD presenting as transfer from [place] for c/f STEMI. Course c/b gallstone pancreatitis leading to ARDS and shock & cardiac arrest now on [facility] ECMO. Had significant troponin elevation and his LVEF down to 8%. Pt was deemed to be too unstable to have an angiogram and potential PCI due to his co-morbidities & a new subdural bleed. Nephrology called for ERIC. SCr on arrival was 2.15; trended down to hector 1.6 on 11/25; slowly trended up & peaked to 3.95 11/28; now trending down to 3.1 today. Pt is non oliguric.         Patient seen & examined. Remains on [facility] ECMO. CVP 2. Back on nicardipine. Off Milrinone now.  Started on bumex 2 mg IV bid today. UOP 4.7L in 24hrs with net negative -1.2L              PAST HISTORY  --------------------------------------------------------------------------------  No significant changes to PMH, PSH, FHx, SHx, unless otherwise noted    ALLERGIES & MEDICATIONS  --------------------------------------------------------------------------------  Allergies    No Known Allergies    Intolerances      Standing Inpatient Medications  albumin human 25% IVPB 50 milliLiter(s) IV Intermittent every 6 hours  argatroban Infusion 0.25 MICROgram(s)/kG/Min IV Continuous <Continuous>  artificial  tears Solution 1 Drop(s) Both EYES four times a day  atorvastatin 40 milliGRAM(s) Oral at bedtime  buMETAnide Injectable 2 milliGRAM(s) IV Push every 12 hours  chlorhexidine 0.12% Liquid 15 milliLiter(s) Oral Mucosa every 12 hours  chlorhexidine 2% Cloths 1 Application(s) Topical <User Schedule>  dexMEDEtomidine Infusion 1.5 MICROgram(s)/kG/Hr IV Continuous <Continuous>  dextrose 50% Injectable 50 milliLiter(s) IV Push every 15 minutes  hydrocortisone sodium succinate Injectable 25 milliGRAM(s) IV Push every 12 hours  insulin regular Infusion 6 Unit(s)/Hr IV Continuous <Continuous>  meropenem  IVPB 500 milliGRAM(s) IV Intermittent every 12 hours  niCARdipine Infusion 2 mG/Hr IV Continuous <Continuous>  pantoprazole  Injectable 40 milliGRAM(s) IV Push two times a day  polyethylene glycol 3350 17 Gram(s) Oral daily  senna 2 Tablet(s) Oral at bedtime  sodium chloride 0.9%. 1000 milliLiter(s) IV Continuous <Continuous>    PRN Inpatient Medications  sodium chloride 0.9% lock flush 10 milliLiter(s) IV Push every 1 hour PRN      REVIEW OF SYSTEMS  --------------------------------------------------------------------------------  unable to obtain      VITALS/PHYSICAL EXAM  --------------------------------------------------------------------------------  T(C): 36 (11-30-22 @ 08:00), Max: 37.3 (11-30-22 @ 08:00)  HR: 69 (11-30-22 @ 09:09) (60 - 73)  BP: --  RR: 21 (11-30-22 @ 09:00) (14 - 32)  SpO2: 100% (11-30-22 @ 09:09) (100% - 100%)  Wt(kg): --        11-29-22 @ 07:01  -  11-30-22 @ 07:00  --------------------------------------------------------  IN: 3453.2 mL / OUT: 4750 mL / NET: -1296.8 mL    11-30-22 @ 07:01  -  11-30-22 @ 10:43  --------------------------------------------------------  IN: 125.8 mL / OUT: 690 mL / NET: -564.2 mL      Physical Exam:  	Gen: intubated  	HEENT: +ET  	Pulm: CTA B/L  	CV: S1S2, no rub  	Abd: Soft, +BS          + presacral edema  	Ext: + LE edema B/L  	Neuro: sedated  	Skin: Warm and dry  	Vascular access: ECMO              +De Dios with red urine (Cyanokit)      LABS/STUDIES  --------------------------------------------------------------------------------              8.0    13.10 >-----------<  93       [11-30-22 @ 00:43]              24.6     155  |  123  |  67  ----------------------------<  139      [11-30-22 @ 00:43]  4.1   |  19  |  3.10        Ca     8.0     [11-30-22 @ 00:43]      Mg     2.9     [11-30-22 @ 00:43]      Phos  2.3     [11-30-22 @ 00:43]    TPro  5.9  /  Alb  3.2  /  TBili  0.8  /  DBili  x   /  AST  42  /  ALT  7   /  AlkPhos  70  [11-30-22 @ 00:43]    PT/INR: PT 16.1 , INR 1.38       [11-30-22 @ 07:05]  PTT: 50.4       [11-30-22 @ 07:05]          [11-29-22 @ 00:26]        [11-30-22 @ 00:43]    Creatinine Trend:  SCr 3.10 [11-30 @ 00:43]  SCr 3.20 [11-29 @ 18:28]  SCr 3.45 [11-29 @ 00:26]  SCr 3.95 [11-28 @ 00:58]  SCr 3.81 [11-27 @ 00:33]    Urinalysis - [11-25-22 @ 10:37]      Color Yellow / Appearance Slightly Turbid / SG 1.027 / pH 6.0      Gluc Negative / Ketone Trace  / Bili Negative / Urobili Negative       Blood Moderate / Protein 30 mg/dL / Leuk Est Negative / Nitrite Negative      RBC 7 / WBC 7 / Hyaline 4 / Gran  / Sq Epi  / Non Sq Epi 2 / Bacteria Negative      TSH 0.60      [11-26-22 @ 05:24]  Lipid: chol --, , HDL --, LDL --      [11-26-22 @ 20:11]       [facility] Division of Kidney Diseases & Hypertension  FOLLOW UP NOTE  [phone]--------------------------------------------------------------------------------  Chief Complaint:Acute pancreatitis without infection or necrosis      HPI:  61 y/o male with PMH of CABG, DM, HTN, HLD presenting as transfer from [place] for c/f STEMI. Course c/b gallstone pancreatitis leading to ARDS and shock & cardiac arrest now on [facility] ECMO. Had significant troponin elevation and his LVEF down to 8%. Pt was deemed to be too unstable to have an angiogram and potential PCI due to his co-morbidities & a new subdural bleed. Nephrology called for ERIC. SCr on arrival was 2.15; trended down to hector 1.6 on 11/25; slowly trended up & peaked to 3.95 11/28; now trending down to 3.1 today. Pt is non oliguric.         Patient seen & examined. Remains on [facility] ECMO. CVP 2. Back on nicardipine. Off Milrinone now.  Started on bumex 2 mg IV bid today. UOP 4.7L in 24hrs with net negative -1.2L              PAST HISTORY  --------------------------------------------------------------------------------  No significant changes to PMH, PSH, FHx, SHx, unless otherwise noted    ALLERGIES & MEDICATIONS  --------------------------------------------------------------------------------  Allergies    No Known Allergies    Intolerances      Standing Inpatient Medications  albumin human 25% IVPB 50 milliLiter(s) IV Intermittent every 6 hours  argatroban Infusion 0.25 MICROgram(s)/kG/Min IV Continuous <Continuous>  artificial  tears Solution 1 Drop(s) Both EYES four times a day  atorvastatin 40 milliGRAM(s) Oral at bedtime  buMETAnide Injectable 2 milliGRAM(s) IV Push every 12 hours  chlorhexidine 0.12% Liquid 15 milliLiter(s) Oral Mucosa every 12 hours  chlorhexidine 2% Cloths 1 Application(s) Topical <User Schedule>  dexMEDEtomidine Infusion 1.5 MICROgram(s)/kG/Hr IV Continuous <Continuous>  dextrose 50% Injectable 50 milliLiter(s) IV Push every 15 minutes  hydrocortisone sodium succinate Injectable 25 milliGRAM(s) IV Push every 12 hours  insulin regular Infusion 6 Unit(s)/Hr IV Continuous <Continuous>  meropenem  IVPB 500 milliGRAM(s) IV Intermittent every 12 hours  niCARdipine Infusion 2 mG/Hr IV Continuous <Continuous>  pantoprazole  Injectable 40 milliGRAM(s) IV Push two times a day  polyethylene glycol 3350 17 Gram(s) Oral daily  senna 2 Tablet(s) Oral at bedtime  sodium chloride 0.9%. 1000 milliLiter(s) IV Continuous <Continuous>    PRN Inpatient Medications  sodium chloride 0.9% lock flush 10 milliLiter(s) IV Push every 1 hour PRN      REVIEW OF SYSTEMS  --------------------------------------------------------------------------------  unable to obtain      VITALS/PHYSICAL EXAM  --------------------------------------------------------------------------------  T(C): 36 (11-30-22 @ 08:00), Max: 37.3 (11-30-22 @ 08:00)  HR: 69 (11-30-22 @ 09:09) (60 - 73)  BP: --  RR: 21 (11-30-22 @ 09:00) (14 - 32)  SpO2: 100% (11-30-22 @ 09:09) (100% - 100%)  Wt(kg): --        11-29-22 @ 07:01  -  11-30-22 @ 07:00  --------------------------------------------------------  IN: 3453.2 mL / OUT: 4750 mL / NET: -1296.8 mL    11-30-22 @ 07:01  -  11-30-22 @ 10:43  --------------------------------------------------------  IN: 125.8 mL / OUT: 690 mL / NET: -564.2 mL      Physical Exam:  	Gen: intubated  	HEENT: +ET  	Pulm: CTA B/L  	CV: S1S2, no rub  	Abd: Soft, +BS          + presacral edema  	Ext: + LE edema B/L  	Neuro: sedated  	Skin: Warm and dry  	Vascular access: ECMO              +De Dios with red urine (Cyanokit)      LABS/STUDIES  --------------------------------------------------------------------------------              8.0    13.10 >-----------<  93       [11-30-22 @ 00:43]              24.6     155  |  123  |  67  ----------------------------<  139      [11-30-22 @ 00:43]  4.1   |  19  |  3.10        Ca     8.0     [11-30-22 @ 00:43]      Mg     2.9     [11-30-22 @ 00:43]      Phos  2.3     [11-30-22 @ 00:43]    TPro  5.9  /  Alb  3.2  /  TBili  0.8  /  DBili  x   /  AST  42  /  ALT  7   /  AlkPhos  70  [11-30-22 @ 00:43]    PT/INR: PT 16.1 , INR 1.38       [11-30-22 @ 07:05]  PTT: 50.4       [11-30-22 @ 07:05]          [11-29-22 @ 00:26]        [11-30-22 @ 00:43]    Creatinine Trend:  SCr 3.10 [11-30 @ 00:43]  SCr 3.20 [11-29 @ 18:28]  SCr 3.45 [11-29 @ 00:26]  SCr 3.95 [11-28 @ 00:58]  SCr 3.81 [11-27 @ 00:33]    Urinalysis - [11-25-22 @ 10:37]      Color Yellow / Appearance Slightly Turbid / SG 1.027 / pH 6.0      Gluc Negative / Ketone Trace  / Bili Negative / Urobili Negative       Blood Moderate / Protein 30 mg/dL / Leuk Est Negative / Nitrite Negative      RBC 7 / WBC 7 / Hyaline 4 / Gran  / Sq Epi  / Non Sq Epi 2 / Bacteria Negative      TSH 0.60      [11-26-22 @ 05:24]  Lipid: chol --, , HDL --, LDL --      [11-26-22 @ 20:11]

## 2022-12-01 LAB
ALBUMIN SERPL ELPH-MCNC: 3.2 G/DL — LOW (ref 3.3–5)
ALP SERPL-CCNC: 102 U/L — SIGNIFICANT CHANGE UP (ref 40–120)
ALT FLD-CCNC: 10 U/L — SIGNIFICANT CHANGE UP (ref 10–45)
AMYLASE P1 CFR SERPL: 422 U/L — HIGH (ref 25–125)
ANION GAP SERPL CALC-SCNC: 13 MMOL/L — SIGNIFICANT CHANGE UP (ref 5–17)
APPEARANCE UR: CLEAR — SIGNIFICANT CHANGE UP
APTT BLD: 42.7 SEC — HIGH (ref 27.5–35.5)
APTT BLD: 44.7 SEC — HIGH (ref 27.5–35.5)
APTT BLD: 46.2 SEC — HIGH (ref 27.5–35.5)
APTT BLD: 49.4 SEC — HIGH (ref 27.5–35.5)
APTT BLD: 55.4 SEC — HIGH (ref 27.5–35.5)
APTT BLD: 58.2 SEC — HIGH (ref 27.5–35.5)
AST SERPL-CCNC: 41 U/L — HIGH (ref 10–40)
BACTERIA # UR AUTO: NEGATIVE — SIGNIFICANT CHANGE UP
BASE EXCESS BLDMV CALC-SCNC: -5.1 MMOL/L — LOW (ref -3–3)
BILIRUB SERPL-MCNC: 0.8 MG/DL — SIGNIFICANT CHANGE UP (ref 0.2–1.2)
BILIRUB UR-MCNC: NEGATIVE — SIGNIFICANT CHANGE UP
BLD GP AB SCN SERPL QL: NEGATIVE — SIGNIFICANT CHANGE UP
BUN SERPL-MCNC: 70 MG/DL — HIGH (ref 7–23)
CALCIUM SERPL-MCNC: 8.1 MG/DL — LOW (ref 8.4–10.5)
CHLORIDE SERPL-SCNC: 119 MMOL/L — HIGH (ref 96–108)
CO2 BLDMV-SCNC: 21 MMOL/L — SIGNIFICANT CHANGE UP (ref 21–29)
CO2 SERPL-SCNC: 19 MMOL/L — LOW (ref 22–31)
COLOR SPEC: SIGNIFICANT CHANGE UP
CREAT SERPL-MCNC: 3.01 MG/DL — HIGH (ref 0.5–1.3)
CULTURE RESULTS: SIGNIFICANT CHANGE UP
DIFF PNL FLD: ABNORMAL
EGFR: 23 ML/MIN/1.73M2 — LOW
EPI CELLS # UR: 1 /HPF — SIGNIFICANT CHANGE UP
FIBRINOGEN PPP-MCNC: 191 MG/DL — LOW (ref 200–445)
FIBRINOGEN PPP-MCNC: 320 MG/DL — SIGNIFICANT CHANGE UP (ref 200–445)
GAS PNL BLDA: SIGNIFICANT CHANGE UP
GAS PNL BLDMV: SIGNIFICANT CHANGE UP
GLUCOSE BLDC GLUCOMTR-MCNC: 108 MG/DL — HIGH (ref 70–99)
GLUCOSE BLDC GLUCOMTR-MCNC: 117 MG/DL — HIGH (ref 70–99)
GLUCOSE BLDC GLUCOMTR-MCNC: 119 MG/DL — HIGH (ref 70–99)
GLUCOSE BLDC GLUCOMTR-MCNC: 120 MG/DL — HIGH (ref 70–99)
GLUCOSE BLDC GLUCOMTR-MCNC: 124 MG/DL — HIGH (ref 70–99)
GLUCOSE BLDC GLUCOMTR-MCNC: 126 MG/DL — HIGH (ref 70–99)
GLUCOSE BLDC GLUCOMTR-MCNC: 127 MG/DL — HIGH (ref 70–99)
GLUCOSE BLDC GLUCOMTR-MCNC: 129 MG/DL — HIGH (ref 70–99)
GLUCOSE BLDC GLUCOMTR-MCNC: 130 MG/DL — HIGH (ref 70–99)
GLUCOSE BLDC GLUCOMTR-MCNC: 134 MG/DL — HIGH (ref 70–99)
GLUCOSE BLDC GLUCOMTR-MCNC: 135 MG/DL — HIGH (ref 70–99)
GLUCOSE BLDC GLUCOMTR-MCNC: 136 MG/DL — HIGH (ref 70–99)
GLUCOSE BLDC GLUCOMTR-MCNC: 139 MG/DL — HIGH (ref 70–99)
GLUCOSE BLDC GLUCOMTR-MCNC: 143 MG/DL — HIGH (ref 70–99)
GLUCOSE BLDC GLUCOMTR-MCNC: 147 MG/DL — HIGH (ref 70–99)
GLUCOSE BLDC GLUCOMTR-MCNC: 155 MG/DL — HIGH (ref 70–99)
GLUCOSE BLDC GLUCOMTR-MCNC: 162 MG/DL — HIGH (ref 70–99)
GLUCOSE BLDC GLUCOMTR-MCNC: 181 MG/DL — HIGH (ref 70–99)
GLUCOSE BLDC GLUCOMTR-MCNC: 90 MG/DL — SIGNIFICANT CHANGE UP (ref 70–99)
GLUCOSE SERPL-MCNC: 139 MG/DL — HIGH (ref 70–99)
GLUCOSE UR QL: NEGATIVE — SIGNIFICANT CHANGE UP
HAPTOGLOB SERPL-MCNC: 205 MG/DL — HIGH (ref 34–200)
HCO3 BLDMV-SCNC: 20 MMOL/L — SIGNIFICANT CHANGE UP (ref 20–28)
HCT VFR BLD CALC: 24.7 % — LOW (ref 39–50)
HGB BLD-MCNC: 8.1 G/DL — LOW (ref 13–17)
HOROWITZ INDEX BLDMV+IHG-RTO: 100 — SIGNIFICANT CHANGE UP
HYALINE CASTS # UR AUTO: 2 /LPF — SIGNIFICANT CHANGE UP (ref 0–2)
INR BLD: 1.33 RATIO — HIGH (ref 0.88–1.16)
KETONES UR-MCNC: NEGATIVE — SIGNIFICANT CHANGE UP
LDH SERPL L TO P-CCNC: 494 U/L — HIGH (ref 50–242)
LEUKOCYTE ESTERASE UR-ACNC: ABNORMAL
LIDOCAIN IGE QN: 994 U/L — HIGH (ref 7–60)
MAGNESIUM SERPL-MCNC: 2.4 MG/DL — SIGNIFICANT CHANGE UP (ref 1.6–2.6)
MCHC RBC-ENTMCNC: 29.3 PG — SIGNIFICANT CHANGE UP (ref 27–34)
MCHC RBC-ENTMCNC: 32.8 GM/DL — SIGNIFICANT CHANGE UP (ref 32–36)
MCV RBC AUTO: 89.5 FL — SIGNIFICANT CHANGE UP (ref 80–100)
NITRITE UR-MCNC: NEGATIVE — SIGNIFICANT CHANGE UP
NRBC # BLD: 0 /100 WBCS — SIGNIFICANT CHANGE UP (ref 0–0)
O2 CT VFR BLD CALC: 45 MMHG — SIGNIFICANT CHANGE UP (ref 30–65)
PCO2 BLDMV: 36 MMHG — SIGNIFICANT CHANGE UP (ref 30–65)
PH BLDMV: 7.35 — SIGNIFICANT CHANGE UP (ref 7.32–7.45)
PH UR: 6 — SIGNIFICANT CHANGE UP (ref 5–8)
PHOSPHATE SERPL-MCNC: 3.1 MG/DL — SIGNIFICANT CHANGE UP (ref 2.5–4.5)
PLATELET # BLD AUTO: 90 K/UL — LOW (ref 150–400)
POTASSIUM SERPL-MCNC: 4 MMOL/L — SIGNIFICANT CHANGE UP (ref 3.5–5.3)
POTASSIUM SERPL-SCNC: 4 MMOL/L — SIGNIFICANT CHANGE UP (ref 3.5–5.3)
PROT SERPL-MCNC: 5.9 G/DL — LOW (ref 6–8.3)
PROT UR-MCNC: ABNORMAL
PROTHROM AB SERPL-ACNC: 15.3 SEC — HIGH (ref 10.5–13.4)
RBC # BLD: 2.76 M/UL — LOW (ref 4.2–5.8)
RBC # FLD: 15.6 % — HIGH (ref 10.3–14.5)
RBC CASTS # UR COMP ASSIST: 34 /HPF — HIGH (ref 0–4)
RH IG SCN BLD-IMP: POSITIVE — SIGNIFICANT CHANGE UP
SAO2 % BLDMV: 72.3 — SIGNIFICANT CHANGE UP (ref 60–90)
SODIUM SERPL-SCNC: 151 MMOL/L — HIGH (ref 135–145)
SP GR SPEC: 1.01 — SIGNIFICANT CHANGE UP (ref 1.01–1.02)
SPECIMEN SOURCE: SIGNIFICANT CHANGE UP
UROBILINOGEN FLD QL: ABNORMAL
WBC # BLD: 11.23 K/UL — HIGH (ref 3.8–10.5)
WBC # FLD AUTO: 11.23 K/UL — HIGH (ref 3.8–10.5)
WBC UR QL: 6 /HPF — HIGH (ref 0–5)

## 2022-12-01 PROCEDURE — 31622 DX BRONCHOSCOPE/WASH: CPT

## 2022-12-01 PROCEDURE — 99292 CRITICAL CARE ADDL 30 MIN: CPT | Mod: 25

## 2022-12-01 PROCEDURE — 99291 CRITICAL CARE FIRST HOUR: CPT

## 2022-12-01 PROCEDURE — 71045 X-RAY EXAM CHEST 1 VIEW: CPT | Mod: 26,77

## 2022-12-01 PROCEDURE — 71045 X-RAY EXAM CHEST 1 VIEW: CPT | Mod: 26

## 2022-12-01 PROCEDURE — 99292 CRITICAL CARE ADDL 30 MIN: CPT

## 2022-12-01 PROCEDURE — 99232 SBSQ HOSP IP/OBS MODERATE 35: CPT

## 2022-12-01 PROCEDURE — 99233 SBSQ HOSP IP/OBS HIGH 50: CPT | Mod: GC

## 2022-12-01 PROCEDURE — 76705 ECHO EXAM OF ABDOMEN: CPT | Mod: 26

## 2022-12-01 RX ORDER — ALBUMIN HUMAN 25 %
50 VIAL (ML) INTRAVENOUS ONCE
Refills: 0 | Status: COMPLETED | OUTPATIENT
Start: 2022-12-01 | End: 2022-12-01

## 2022-12-01 RX ORDER — ACETAMINOPHEN 500 MG
1000 TABLET ORAL ONCE
Refills: 0 | Status: COMPLETED | OUTPATIENT
Start: 2022-12-01 | End: 2022-12-01

## 2022-12-01 RX ORDER — CALCIUM GLUCONATE 100 MG/ML
1 VIAL (ML) INTRAVENOUS ONCE
Refills: 0 | Status: COMPLETED | OUTPATIENT
Start: 2022-12-01 | End: 2022-12-01

## 2022-12-01 RX ORDER — ALBUMIN HUMAN 25 %
100 VIAL (ML) INTRAVENOUS ONCE
Refills: 0 | Status: COMPLETED | OUTPATIENT
Start: 2022-12-01 | End: 2022-12-01

## 2022-12-01 RX ORDER — POTASSIUM CHLORIDE 20 MEQ
10 PACKET (EA) ORAL
Refills: 0 | Status: COMPLETED | OUTPATIENT
Start: 2022-12-01 | End: 2022-12-01

## 2022-12-01 RX ORDER — HYDROMORPHONE HYDROCHLORIDE 2 MG/ML
0.5 INJECTION INTRAMUSCULAR; INTRAVENOUS; SUBCUTANEOUS EVERY 4 HOURS
Refills: 0 | Status: DISCONTINUED | OUTPATIENT
Start: 2022-12-01 | End: 2022-12-05

## 2022-12-01 RX ADMIN — Medication 50 MILLILITER(S): at 18:17

## 2022-12-01 RX ADMIN — CHLORHEXIDINE GLUCONATE 15 MILLILITER(S): 213 SOLUTION TOPICAL at 05:05

## 2022-12-01 RX ADMIN — MEROPENEM 100 MILLIGRAM(S): 1 INJECTION INTRAVENOUS at 17:26

## 2022-12-01 RX ADMIN — BUMETANIDE 2 MILLIGRAM(S): 0.25 INJECTION INTRAMUSCULAR; INTRAVENOUS at 05:03

## 2022-12-01 RX ADMIN — DEXMEDETOMIDINE HYDROCHLORIDE IN 0.9% SODIUM CHLORIDE 34.8 MICROGRAM(S)/KG/HR: 4 INJECTION INTRAVENOUS at 22:16

## 2022-12-01 RX ADMIN — BUMETANIDE 2 MILLIGRAM(S): 0.25 INJECTION INTRAMUSCULAR; INTRAVENOUS at 17:24

## 2022-12-01 RX ADMIN — Medication 50 MILLIEQUIVALENT(S): at 08:09

## 2022-12-01 RX ADMIN — SENNA PLUS 2 TABLET(S): 8.6 TABLET ORAL at 22:15

## 2022-12-01 RX ADMIN — Medication 1 DROP(S): at 05:05

## 2022-12-01 RX ADMIN — INSULIN HUMAN 6 UNIT(S)/HR: 100 INJECTION, SOLUTION SUBCUTANEOUS at 22:27

## 2022-12-01 RX ADMIN — Medication 25 MILLIGRAM(S): at 05:10

## 2022-12-01 RX ADMIN — Medication 1 DROP(S): at 17:27

## 2022-12-01 RX ADMIN — Medication 100 GRAM(S): at 09:25

## 2022-12-01 RX ADMIN — Medication 1 DROP(S): at 12:56

## 2022-12-01 RX ADMIN — SODIUM CHLORIDE 10 MILLILITER(S): 9 INJECTION INTRAMUSCULAR; INTRAVENOUS; SUBCUTANEOUS at 12:12

## 2022-12-01 RX ADMIN — CHLORHEXIDINE GLUCONATE 15 MILLILITER(S): 213 SOLUTION TOPICAL at 17:23

## 2022-12-01 RX ADMIN — CHLORHEXIDINE GLUCONATE 1 APPLICATION(S): 213 SOLUTION TOPICAL at 05:05

## 2022-12-01 RX ADMIN — ARGATROBAN 0.56 MICROGRAM(S)/KG/MIN: 50 INJECTION, SOLUTION INTRAVENOUS at 22:28

## 2022-12-01 RX ADMIN — NICARDIPINE HYDROCHLORIDE 10 MG/HR: 30 CAPSULE, EXTENDED RELEASE ORAL at 22:16

## 2022-12-01 RX ADMIN — HYDROMORPHONE HYDROCHLORIDE 0.5 MILLIGRAM(S): 2 INJECTION INTRAMUSCULAR; INTRAVENOUS; SUBCUTANEOUS at 11:45

## 2022-12-01 RX ADMIN — PANTOPRAZOLE SODIUM 40 MILLIGRAM(S): 20 TABLET, DELAYED RELEASE ORAL at 05:04

## 2022-12-01 RX ADMIN — ATORVASTATIN CALCIUM 40 MILLIGRAM(S): 80 TABLET, FILM COATED ORAL at 22:15

## 2022-12-01 RX ADMIN — PANTOPRAZOLE SODIUM 40 MILLIGRAM(S): 20 TABLET, DELAYED RELEASE ORAL at 17:24

## 2022-12-01 RX ADMIN — Medication 1000 MILLIGRAM(S): at 05:55

## 2022-12-01 RX ADMIN — HYDROMORPHONE HYDROCHLORIDE 0.5 MILLIGRAM(S): 2 INJECTION INTRAMUSCULAR; INTRAVENOUS; SUBCUTANEOUS at 11:30

## 2022-12-01 RX ADMIN — MEROPENEM 100 MILLIGRAM(S): 1 INJECTION INTRAVENOUS at 05:03

## 2022-12-01 RX ADMIN — Medication 50 MILLIEQUIVALENT(S): at 07:45

## 2022-12-01 RX ADMIN — Medication 400 MILLIGRAM(S): at 05:40

## 2022-12-01 RX ADMIN — POLYETHYLENE GLYCOL 3350 17 GRAM(S): 17 POWDER, FOR SOLUTION ORAL at 15:19

## 2022-12-01 RX ADMIN — Medication 50 MILLILITER(S): at 09:27

## 2022-12-01 NOTE — CHART NOTE - NSCHARTNOTEFT_GEN_A_CORE
Nutrition Follow Up Note  Patient seen for: follow up on CTU.     Chart reviewed, events noted. Pt is a 63 YO M with a history of CAD s/p 4v CABG ~ in [facility], DM2 (A1c 7.3%), and HTN who initially presented to OSH with abdominal pain and n/v and found to have pancreatitis with lipase > 3000 though also with elevated troponins. CT abdomen revealed acute pancreatitis and his initial LVEF was 40-45%. He developed MSOF with hypoxic respiratory failure requiring intubation and ERIC and went into hypoxic PEA arrest requiring ACLS and due to persistent hypoxia and hypotension on pressors after ROSC was cannulated to [facility] (LFV, RFA, no anterograde perfusion catheter) on . Notably CTH that day revealed small subdural hemorrhage.    -- Tolerating ECMO weans   -- Pt awaiting LHC/IABP placement   -- Renal function improving    Source: [] Patient       [x] EMR        [x] RN        [] Family at bedside       [] Other:    -If unable to interview patient: [x] Trach/Vent/BiPAP  [] Disoriented/confused/inappropriate to interview    Diet Order:   Diet, NPO (22)    EN Order Provides: n/a    Current Pump Rate: n/a  EN provision: Pt receiving trickle feeds of Vital 1.5 (highest rate 30mL/hr) since . Pt made NPO overnight 2/2 elevated pancreatic enzymes.   - : 510mL    - : 470mL    - : 210mL   - : 110mL  ***Pt meeting <50% EER x > 5 days    Nutrition-related concerns:   - Pt remains intubated, sedated on Precedex.   - A1c 7.3%. Insulin gtt for BG management.   - Hypernatremia noted, FWF 250mL q4H.   - ERIC: pt receiving Bumex   - Antibiotic regimen noted for pancreatitis, ID following   - IVF: NS @ 5mL/hr    GI:  Last BM    - Currently NPO for elevated pancreatic enzymes (Amylase: 422; Lipase: 994), otherwise previously tolerating EN   - Bowel regimen ordered    Weights:   Daily Weight in k.2 (), Weight in k.2 (), Weight in k.3 (), Weight in k.6 (11-27)   - weight trending down, likely 2/2 Bumex and inadequate protein energy intake, will continue to monitor    MEDICATIONS  (STANDING):  argatroban Infusion 0.16 MICROgram(s)/kG/Min (0.89 mL/Hr) IV Continuous <Continuous>  artificial  tears Solution 1 Drop(s) Both EYES four times a day  atorvastatin 40 milliGRAM(s) Oral at bedtime  buMETAnide Injectable 2 milliGRAM(s) IV Push every 12 hours  chlorhexidine 0.12% Liquid 15 milliLiter(s) Oral Mucosa every 12 hours  chlorhexidine 2% Cloths 1 Application(s) Topical <User Schedule>  dexMEDEtomidine Infusion 1.5 MICROgram(s)/kG/Hr (34.8 mL/Hr) IV Continuous <Continuous>  dextrose 50% Injectable 50 milliLiter(s) IV Push every 15 minutes  insulin regular Infusion 6 Unit(s)/Hr (6 mL/Hr) IV Continuous <Continuous>  meropenem  IVPB 500 milliGRAM(s) IV Intermittent every 12 hours  metolazone 10 milliGRAM(s) Oral daily  niCARdipine Infusion 2 mG/Hr (10 mL/Hr) IV Continuous <Continuous>  pantoprazole  Injectable 40 milliGRAM(s) IV Push two times a day  polyethylene glycol 3350 17 Gram(s) Oral daily  senna 2 Tablet(s) Oral at bedtime  sodium chloride 0.9%. 1000 milliLiter(s) (5 mL/Hr) IV Continuous <Continuous>    MEDICATIONS  (PRN):  HYDROmorphone  Injectable 0.5 milliGRAM(s) IV Push every 4 hours PRN Severe Pain (7 - 10)  sodium chloride 0.9% lock flush 10 milliLiter(s) IV Push every 1 hour PRN Pre/post blood products, medications, blood draw, and to maintain line patency    Pertinent Labs:  @ 01:08: Na 151<H>, BUN 70<H>, Cr 3.01<H>, <H>, K+ 4.0, Phos 3.1, Mg 2.4, Alk Phos 102, ALT/SGPT 10, AST/SGOT 41<H>, HbA1c --   @ 13:10: Na 154<H>, BUN 69<H>, Cr 3.02<H>, <H>, K+ 3.6, Phos --, Mg --, Alk Phos 79, ALT/SGPT 10, AST/SGOT 38, HbA1c --    A1C with Estimated Average Glucose Result: 7.3 % (22 @ 03:58)    Finger Sticks:  POCT Blood Glucose.: 155 mg/dL ( @ 09:38)  POCT Blood Glucose.: 162 mg/dL ( @ 09:17)  POCT Blood Glucose.: 181 mg/dL ( @ 08:03)  POCT Blood Glucose.: 135 mg/dL ( @ 06:47)  POCT Blood Glucose.: 90 mg/dL ( @ 05:55)  POCT Blood Glucose.: 120 mg/dL ( @ 04:47)  POCT Blood Glucose.: 134 mg/dL ( @ 03:49)  POCT Blood Glucose.: 117 mg/dL ( @ 03:00)  POCT Blood Glucose.: 126 mg/dL ( 01:47)  POCT Blood Glucose.: 139 mg/dL ( @ 00:49)  POCT Blood Glucose.: 143 mg/dL ( @ 00:04)  POCT Blood Glucose.: 127 mg/dL ( @ 22:50)  POCT Blood Glucose.: 133 mg/dL ( @ 22:34)  POCT Blood Glucose.: 158 mg/dL ( @ 20:52)  POCT Blood Glucose.: 155 mg/dL ( @ 19:53)  POCT Blood Glucose.: 147 mg/dL ( @ 18:52)  POCT Blood Glucose.: 146 mg/dL ( @ 18:15)  POCT Blood Glucose.: 136 mg/dL ( @ 16:56)  POCT Blood Glucose.: 139 mg/dL ( @ 16:07)  POCT Blood Glucose.: 141 mg/dL ( @ 14:53)  POCT Blood Glucose.: 133 mg/dL ( @ 13:50)  POCT Blood Glucose.: 139 mg/dL ( @ 12:52)  POCT Blood Glucose.: 133 mg/dL ( @ 11:52)  POCT Blood Glucose.: 140 mg/dL ( @ 11:07)    Triglycerides, Serum: 337 mg/dL (22 @ 20:11)  Triglycerides, Serum: 103 mg/dL (22 @ 08:34)    Skin per nursing documentation: No noted pressure injuries as per documentation.   Edema: 2+ generalized    Adjusted based on most recent wt 87.2kg () for energy needs, IBW 69.8kg for protein  Estimated Energy Needs: 1744 - 2180kcal (20 - 25kcal/kg)  Estimated Protein Needs: 112 - 139g protein (1.6 - 2.0g/kg)  Estimated Fluid Needs: per team.  [place] State Equation: 1833kcal ()    Previous Nutrition Diagnosis: Inadequate Protein Energy Intake, Increased Nutrient Needs  Nutrition Diagnosis is: [x] ongoing  [] resolved [] not applicable     New Nutrition Diagnosis: [x] Not applicable    Nutrition Care Plan:  [x] In Progress  [] Achieved  [] Not applicable    Nutrition Interventions:     Education Provided:       [] Yes:  [x] No: N/A at this time    Recommendations:        Monitoring and Evaluation:   Continue to monitor nutritional intake, tolerance to diet prescription, weights, labs, skin integrity    RD remains available upon request and will follow up per protocol    Mala Han MS, RD, CDN, [facility] Pager #[phone] Nutrition Follow Up Note  Patient seen for: follow up on CTU.     Chart reviewed, events noted. Pt is a 63 YO M with a history of CAD s/p 4v CABG ~ in [facility], DM2 (A1c 7.3%), and HTN who initially presented to OSH with abdominal pain and n/v and found to have pancreatitis with lipase > 3000 though also with elevated troponins. CT abdomen revealed acute pancreatitis and his initial LVEF was 40-45%. He developed MSOF with hypoxic respiratory failure requiring intubation and ERIC and went into hypoxic PEA arrest requiring ACLS and due to persistent hypoxia and hypotension on pressors after ROSC was cannulated to [facility] (LFV, RFA, no anterograde perfusion catheter) on . Notably CTH that day revealed small subdural hemorrhage.    -- Tolerating ECMO weans   -- Pt awaiting LHC/IABP placement   -- Renal function improving    Source: [] Patient       [x] EMR        [x] RN        [] Family at bedside       [] Other:    -If unable to interview patient: [x] Trach/Vent/BiPAP  [] Disoriented/confused/inappropriate to interview    Diet Order:   Diet, NPO (22)    EN Order Provides: n/a    Current Pump Rate: n/a  EN provision: Pt receiving trickle feeds of Vital 1.5 (highest rate 30mL/hr) since . Pt made NPO overnight 2/2 elevated pancreatic enzymes.   - : 510mL    - : 470mL    - : 210mL   - : 110mL  ***Pt meeting <50% EER x > 5 days    Nutrition-related concerns:   - Pt remains intubated, sedated on Precedex.   - A1c 7.3%. Insulin gtt for BG management.   - Hypernatremia noted, FWF 250mL q4H.   - ERIC: pt receiving Bumex   - Antibiotic regimen noted for pancreatitis, ID following   - IVF: NS @ 5mL/hr    GI:  Last BM    - Currently NPO for elevated pancreatic enzymes (Amylase: 422; Lipase: 994), otherwise previously tolerating EN   - Bowel regimen ordered    Weights:   Daily Weight in k.2 (), Weight in k.2 (), Weight in k.3 (), Weight in k.6 (11-27)   - weight trending down, likely 2/2 Bumex and inadequate protein energy intake, will continue to monitor    MEDICATIONS  (STANDING):  argatroban Infusion 0.16 MICROgram(s)/kG/Min (0.89 mL/Hr) IV Continuous <Continuous>  artificial  tears Solution 1 Drop(s) Both EYES four times a day  atorvastatin 40 milliGRAM(s) Oral at bedtime  buMETAnide Injectable 2 milliGRAM(s) IV Push every 12 hours  chlorhexidine 0.12% Liquid 15 milliLiter(s) Oral Mucosa every 12 hours  chlorhexidine 2% Cloths 1 Application(s) Topical <User Schedule>  dexMEDEtomidine Infusion 1.5 MICROgram(s)/kG/Hr (34.8 mL/Hr) IV Continuous <Continuous>  dextrose 50% Injectable 50 milliLiter(s) IV Push every 15 minutes  insulin regular Infusion 6 Unit(s)/Hr (6 mL/Hr) IV Continuous <Continuous>  meropenem  IVPB 500 milliGRAM(s) IV Intermittent every 12 hours  metolazone 10 milliGRAM(s) Oral daily  niCARdipine Infusion 2 mG/Hr (10 mL/Hr) IV Continuous <Continuous>  pantoprazole  Injectable 40 milliGRAM(s) IV Push two times a day  polyethylene glycol 3350 17 Gram(s) Oral daily  senna 2 Tablet(s) Oral at bedtime  sodium chloride 0.9%. 1000 milliLiter(s) (5 mL/Hr) IV Continuous <Continuous>    MEDICATIONS  (PRN):  HYDROmorphone  Injectable 0.5 milliGRAM(s) IV Push every 4 hours PRN Severe Pain (7 - 10)  sodium chloride 0.9% lock flush 10 milliLiter(s) IV Push every 1 hour PRN Pre/post blood products, medications, blood draw, and to maintain line patency    Pertinent Labs:  @ 01:08: Na 151<H>, BUN 70<H>, Cr 3.01<H>, <H>, K+ 4.0, Phos 3.1, Mg 2.4, Alk Phos 102, ALT/SGPT 10, AST/SGOT 41<H>, HbA1c --   @ 13:10: Na 154<H>, BUN 69<H>, Cr 3.02<H>, <H>, K+ 3.6, Phos --, Mg --, Alk Phos 79, ALT/SGPT 10, AST/SGOT 38, HbA1c --    A1C with Estimated Average Glucose Result: 7.3 % (22 @ 03:58)    Finger Sticks:  POCT Blood Glucose.: 155 mg/dL ( @ 09:38)  POCT Blood Glucose.: 162 mg/dL ( @ 09:17)  POCT Blood Glucose.: 181 mg/dL ( @ 08:03)  POCT Blood Glucose.: 135 mg/dL ( @ 06:47)  POCT Blood Glucose.: 90 mg/dL ( @ 05:55)  POCT Blood Glucose.: 120 mg/dL ( @ 04:47)  POCT Blood Glucose.: 134 mg/dL ( @ 03:49)  POCT Blood Glucose.: 117 mg/dL ( @ 03:00)  POCT Blood Glucose.: 126 mg/dL ( 01:47)  POCT Blood Glucose.: 139 mg/dL ( @ 00:49)  POCT Blood Glucose.: 143 mg/dL ( @ 00:04)  POCT Blood Glucose.: 127 mg/dL ( @ 22:50)  POCT Blood Glucose.: 133 mg/dL ( @ 22:34)  POCT Blood Glucose.: 158 mg/dL ( @ 20:52)  POCT Blood Glucose.: 155 mg/dL ( @ 19:53)  POCT Blood Glucose.: 147 mg/dL ( @ 18:52)  POCT Blood Glucose.: 146 mg/dL ( @ 18:15)  POCT Blood Glucose.: 136 mg/dL ( @ 16:56)  POCT Blood Glucose.: 139 mg/dL ( @ 16:07)  POCT Blood Glucose.: 141 mg/dL ( @ 14:53)  POCT Blood Glucose.: 133 mg/dL ( @ 13:50)  POCT Blood Glucose.: 139 mg/dL ( @ 12:52)  POCT Blood Glucose.: 133 mg/dL ( @ 11:52)  POCT Blood Glucose.: 140 mg/dL ( @ 11:07)    Triglycerides, Serum: 337 mg/dL (22 @ 20:11)  Triglycerides, Serum: 103 mg/dL (22 @ 08:34)    Skin per nursing documentation: No noted pressure injuries as per documentation.   Edema: 2+ generalized    Adjusted based on most recent wt 87.2kg () for energy needs, IBW 69.8kg for protein  Estimated Energy Needs: 1744 - 2180kcal (20 - 25kcal/kg)  Estimated Protein Needs: 112 - 139g protein (1.6 - 2.0g/kg)  Estimated Fluid Needs: per team.  [place] State Equation: 1833kcal ()    Previous Nutrition Diagnosis: Inadequate Protein Energy Intake, Increased Nutrient Needs  Nutrition Diagnosis is: [x] ongoing  [] resolved [] not applicable     New Nutrition Diagnosis: [x] Not applicable    Nutrition Care Plan:  [x] In Progress  [] Achieved  [] Not applicable    Nutrition Interventions:     Education Provided:       [] Yes:  [x] No: N/A at this time    Recommendations:        Monitoring and Evaluation:   Continue to monitor nutritional intake, tolerance to diet prescription, weights, labs, skin integrity    RD remains available upon request and will follow up per protocol    Mala Han MS, RD, CDN, [facility] Pager #[phone] Nutrition Follow Up Note  Patient seen for: follow up on CTU.     Chart reviewed, events noted. Pt is a 63 YO M with a history of CAD s/p 4v CABG ~ in [facility], DM2 (A1c 7.3%), and HTN who initially presented to OSH with abdominal pain and n/v and found to have pancreatitis with lipase > 3000 though also with elevated troponins. CT abdomen revealed acute pancreatitis and his initial LVEF was 40-45%. He developed MSOF with hypoxic respiratory failure requiring intubation and ERIC and went into hypoxic PEA arrest requiring ACLS and due to persistent hypoxia and hypotension on pressors after ROSC was cannulated to [facility]O (LFV, RFA, no anterograde perfusion catheter) on . Notably CTH that day revealed small subdural hemorrhage.    -- Tolerating ECMO weans   -- Pt awaiting LHC/IABP placement   -- Renal function improving    Source: [] Patient       [x] EMR        [x] RN        [] Family at bedside       [] Other:    -If unable to interview patient: [x] Trach/Vent/BiPAP  [] Disoriented/confused/inappropriate to interview    Diet Order:   Diet, NPO (22)    EN Order Provides: n/a    Current Pump Rate: n/a  EN provision: Pt receiving trickle feeds of Vital 1.5 (highest rate 30mL/hr) since . Pt made NPO overnight 2/2 elevated pancreatic enzymes.   - : 510mL    - : 470mL    - : 210mL   - : 110mL  ***Pt meeting <50% EER x > 5 days    Nutrition-related concerns:   - Pt remains intubated, sedated on Precedex.   - A1c 7.3%. Insulin gtt for BG management.   - Hypernatremia noted, FWF 250mL q4H.   - ERIC: pt receiving Bumex   - Antibiotic regimen noted for pancreatitis, ID following   - IVF: NS @ 5mL/hr    GI:  Last BM    - Currently NPO for elevated pancreatic enzymes (Amylase: 422; Lipase: 994), otherwise previously tolerating EN   - Bowel regimen ordered    Weights:   Daily Weight in k.2 (), Weight in k.2 (), Weight in k.3 (), Weight in k.6 (11-27)   - weight trending down, likely 2/2 Bumex and inadequate protein energy intake, will continue to monitor    MEDICATIONS  (STANDING):  argatroban Infusion 0.16 MICROgram(s)/kG/Min (0.89 mL/Hr) IV Continuous <Continuous>  artificial  tears Solution 1 Drop(s) Both EYES four times a day  atorvastatin 40 milliGRAM(s) Oral at bedtime  buMETAnide Injectable 2 milliGRAM(s) IV Push every 12 hours  chlorhexidine 0.12% Liquid 15 milliLiter(s) Oral Mucosa every 12 hours  chlorhexidine 2% Cloths 1 Application(s) Topical <User Schedule>  dexMEDEtomidine Infusion 1.5 MICROgram(s)/kG/Hr (34.8 mL/Hr) IV Continuous <Continuous>  dextrose 50% Injectable 50 milliLiter(s) IV Push every 15 minutes  insulin regular Infusion 6 Unit(s)/Hr (6 mL/Hr) IV Continuous <Continuous>  meropenem  IVPB 500 milliGRAM(s) IV Intermittent every 12 hours  metolazone 10 milliGRAM(s) Oral daily  niCARdipine Infusion 2 mG/Hr (10 mL/Hr) IV Continuous <Continuous>  pantoprazole  Injectable 40 milliGRAM(s) IV Push two times a day  polyethylene glycol 3350 17 Gram(s) Oral daily  senna 2 Tablet(s) Oral at bedtime  sodium chloride 0.9%. 1000 milliLiter(s) (5 mL/Hr) IV Continuous <Continuous>    MEDICATIONS  (PRN):  HYDROmorphone  Injectable 0.5 milliGRAM(s) IV Push every 4 hours PRN Severe Pain (7 - 10)  sodium chloride 0.9% lock flush 10 milliLiter(s) IV Push every 1 hour PRN Pre/post blood products, medications, blood draw, and to maintain line patency    Pertinent Labs:  @ 01:08: Na 151<H>, BUN 70<H>, Cr 3.01<H>, <H>, K+ 4.0, Phos 3.1, Mg 2.4, Alk Phos 102, ALT/SGPT 10, AST/SGOT 41<H>, HbA1c --   @ 13:10: Na 154<H>, BUN 69<H>, Cr 3.02<H>, <H>, K+ 3.6, Phos --, Mg --, Alk Phos 79, ALT/SGPT 10, AST/SGOT 38, HbA1c --    A1C with Estimated Average Glucose Result: 7.3 % (22 @ 03:58)    Finger Sticks:  POCT Blood Glucose.: 155 mg/dL ( @ 09:38)  POCT Blood Glucose.: 162 mg/dL ( @ 09:17)  POCT Blood Glucose.: 181 mg/dL ( @ 08:03)  POCT Blood Glucose.: 135 mg/dL ( @ 06:47)  POCT Blood Glucose.: 90 mg/dL ( @ 05:55)  POCT Blood Glucose.: 120 mg/dL ( @ 04:47)  POCT Blood Glucose.: 134 mg/dL ( @ 03:49)  POCT Blood Glucose.: 117 mg/dL ( @ 03:00)  POCT Blood Glucose.: 126 mg/dL ( 01:47)  POCT Blood Glucose.: 139 mg/dL ( @ 00:49)  POCT Blood Glucose.: 143 mg/dL ( @ 00:04)  POCT Blood Glucose.: 127 mg/dL ( @ 22:50)  POCT Blood Glucose.: 133 mg/dL ( @ 22:34)  POCT Blood Glucose.: 158 mg/dL ( @ 20:52)  POCT Blood Glucose.: 155 mg/dL ( @ 19:53)  POCT Blood Glucose.: 147 mg/dL ( @ 18:52)  POCT Blood Glucose.: 146 mg/dL ( @ 18:15)  POCT Blood Glucose.: 136 mg/dL ( @ 16:56)  POCT Blood Glucose.: 139 mg/dL ( @ 16:07)  POCT Blood Glucose.: 141 mg/dL ( @ 14:53)  POCT Blood Glucose.: 133 mg/dL ( @ 13:50)  POCT Blood Glucose.: 139 mg/dL ( @ 12:52)  POCT Blood Glucose.: 133 mg/dL ( @ 11:52)  POCT Blood Glucose.: 140 mg/dL ( @ 11:07)    Triglycerides, Serum: 337 mg/dL (22 @ 20:11)  Triglycerides, Serum: 103 mg/dL (22 @ 08:34)    Skin per nursing documentation: No noted pressure injuries as per documentation.   Edema: 2+ generalized    Adjusted based on most recent wt 87.2kg () for energy needs, IBW 69.8kg for protein  Estimated Energy Needs: 1744 - 2180kcal (20 - 25kcal/kg)  Estimated Protein Needs: 112 - 139g protein (1.6 - 2.0g/kg)  Estimated Fluid Needs: per team.  [place] State Equation: 1833kcal ()    Previous Nutrition Diagnosis: Inadequate Protein Energy Intake, Increased Nutrient Needs  Nutrition Diagnosis is: [x] ongoing  [] resolved [] not applicable     New Nutrition Diagnosis: [x] Not applicable    Nutrition Care Plan:  [x] In Progress  [] Achieved  [] Not applicable    Nutrition Interventions:     Education Provided:       [] Yes:  [x] No: N/A at this time    Recommendations:        Monitoring and Evaluation:   Continue to monitor nutritional intake, tolerance to diet prescription, weights, labs, skin integrity    RD remains available upon request and will follow up per protocol    Mala Han MS, RD, CDN, [facility] Pager #[phone] Nutrition Follow Up Note  Patient seen for: follow up on CTU.     Chart reviewed, events noted. Pt is a 63 YO M with a history of CAD s/p 4v CABG ~ in [facility], DM2 (A1c 7.3%), and HTN who initially presented to OSH with abdominal pain and n/v and found to have pancreatitis with lipase > 3000 though also with elevated troponins. CT abdomen revealed acute pancreatitis and his initial LVEF was 40-45%. He developed MSOF with hypoxic respiratory failure requiring intubation and ERIC and went into hypoxic PEA arrest requiring ACLS and due to persistent hypoxia and hypotension on pressors after ROSC was cannulated to [facility]O (LFV, RFA, no anterograde perfusion catheter) on . Notably CTH that day revealed small subdural hemorrhage.    -- Tolerating ECMO weans   -- Pt awaiting LHC/IABP placement   -- Renal function improving    Source: [] Patient       [x] EMR        [x] RN        [] Family at bedside       [] Other:    -If unable to interview patient: [x] Trach/Vent/BiPAP  [] Disoriented/confused/inappropriate to interview    Diet Order:   Diet, NPO (22)    EN Order Provides: n/a    Current Pump Rate: n/a  EN provision: Pt receiving trickle feeds of Vital 1.5 (highest rate 30mL/hr) since . Pt made NPO overnight 2/2 elevated pancreatic enzymes.   - : 510mL    - : 470mL    - : 210mL   - : 110mL  ***Pt meeting <50% EER x > 5 days    Nutrition-related concerns:   - Pt remains intubated, sedated on Precedex.   - A1c 7.3%. Insulin gtt for BG management.   - Hypernatremia noted, FWF 250mL q4H.   - ERCI: pt receiving Bumex   - Antibiotic regimen noted for pancreatitis, ID following   - IVF: NS @ 5mL/hr    GI:  Last BM    - Currently NPO for elevated pancreatic enzymes (Amylase: 422; Lipase: 994), otherwise previously tolerating EN   - Bowel regimen ordered    Weights:   Daily Weight in k.2 (), Weight in k.2 (), Weight in k.3 (), Weight in k.6 (11-27)   - weight trending down, likely 2/2 Bumex and inadequate protein energy intake (currently 5% wt loss x 1 week), will continue to monitor    MEDICATIONS  (STANDING):  argatroban Infusion 0.16 MICROgram(s)/kG/Min (0.89 mL/Hr) IV Continuous <Continuous>  artificial  tears Solution 1 Drop(s) Both EYES four times a day  atorvastatin 40 milliGRAM(s) Oral at bedtime  buMETAnide Injectable 2 milliGRAM(s) IV Push every 12 hours  chlorhexidine 0.12% Liquid 15 milliLiter(s) Oral Mucosa every 12 hours  chlorhexidine 2% Cloths 1 Application(s) Topical <User Schedule>  dexMEDEtomidine Infusion 1.5 MICROgram(s)/kG/Hr (34.8 mL/Hr) IV Continuous <Continuous>  dextrose 50% Injectable 50 milliLiter(s) IV Push every 15 minutes  insulin regular Infusion 6 Unit(s)/Hr (6 mL/Hr) IV Continuous <Continuous>  meropenem  IVPB 500 milliGRAM(s) IV Intermittent every 12 hours  metolazone 10 milliGRAM(s) Oral daily  niCARdipine Infusion 2 mG/Hr (10 mL/Hr) IV Continuous <Continuous>  pantoprazole  Injectable 40 milliGRAM(s) IV Push two times a day  polyethylene glycol 3350 17 Gram(s) Oral daily  senna 2 Tablet(s) Oral at bedtime  sodium chloride 0.9%. 1000 milliLiter(s) (5 mL/Hr) IV Continuous <Continuous>    MEDICATIONS  (PRN):  HYDROmorphone  Injectable 0.5 milliGRAM(s) IV Push every 4 hours PRN Severe Pain (7 - 10)  sodium chloride 0.9% lock flush 10 milliLiter(s) IV Push every 1 hour PRN Pre/post blood products, medications, blood draw, and to maintain line patency    Pertinent Labs:  @ 01:08: Na 151<H>, BUN 70<H>, Cr 3.01<H>, <H>, K+ 4.0, Phos 3.1, Mg 2.4, Alk Phos 102, ALT/SGPT 10, AST/SGOT 41<H>, HbA1c --   @ 13:10: Na 154<H>, BUN 69<H>, Cr 3.02<H>, <H>, K+ 3.6, Phos --, Mg --, Alk Phos 79, ALT/SGPT 10, AST/SGOT 38, HbA1c --    A1C with Estimated Average Glucose Result: 7.3 % (22 @ 03:58)    Finger Sticks:  POCT Blood Glucose.: 155 mg/dL ( @ 09:38)  POCT Blood Glucose.: 162 mg/dL ( @ 09:17)  POCT Blood Glucose.: 181 mg/dL ( @ 08:03)  POCT Blood Glucose.: 135 mg/dL ( @ 06:47)  POCT Blood Glucose.: 90 mg/dL ( @ 05:55)  POCT Blood Glucose.: 120 mg/dL ( @ 04:47)  POCT Blood Glucose.: 134 mg/dL ( @ 03:49)  POCT Blood Glucose.: 117 mg/dL ( @ 03:00)  POCT Blood Glucose.: 126 mg/dL ( 01:47)  POCT Blood Glucose.: 139 mg/dL ( @ 00:49)  POCT Blood Glucose.: 143 mg/dL ( @ 00:04)  POCT Blood Glucose.: 127 mg/dL ( @ 22:50)  POCT Blood Glucose.: 133 mg/dL ( @ 22:34)  POCT Blood Glucose.: 158 mg/dL ( @ 20:52)  POCT Blood Glucose.: 155 mg/dL ( @ 19:53)  POCT Blood Glucose.: 147 mg/dL ( @ 18:52)  POCT Blood Glucose.: 146 mg/dL ( @ 18:15)  POCT Blood Glucose.: 136 mg/dL ( @ 16:56)  POCT Blood Glucose.: 139 mg/dL ( @ 16:07)  POCT Blood Glucose.: 141 mg/dL ( @ 14:53)  POCT Blood Glucose.: 133 mg/dL ( @ 13:50)  POCT Blood Glucose.: 139 mg/dL ( @ 12:52)  POCT Blood Glucose.: 133 mg/dL ( @ 11:52)  POCT Blood Glucose.: 140 mg/dL ( @ 11:07)    Triglycerides, Serum: 337 mg/dL (22 @ 20:11)  Triglycerides, Serum: 103 mg/dL (22 @ 08:34)    Skin per nursing documentation: No noted pressure injuries as per documentation.   Edema: 2+ generalized    Adjusted based on most recent wt 87.2kg () for energy needs, IBW 69.8kg for protein  Estimated Energy Needs: 1744 - 2180kcal (20 - 25kcal/kg)  Estimated Protein Needs: 112 - 139g protein (1.6 - 2.0g/kg)  Estimated Fluid Needs: per team.  [place] State Equation: 1833kcal ()    Previous Nutrition Diagnosis: Inadequate Protein Energy Intake, Increased Nutrient Needs  Nutrition Diagnosis is: [x] ongoing  [] resolved [] not applicable     New Nutrition Diagnosis: [x] Severe acute malnutrition related to inadequate protein energy intake as evidenced by pt meeting <50% EER x > 5 days, 5% weight loss (lean body mass + fluid), edema likely masking further weight loss.  Goal: Pt to meet >80% of estimated nutritional needs during hospital stay.     Nutrition Care Plan:  [x] In Progress  [] Achieved  [] Not applicable    Nutrition Interventions:     Education Provided:       [] Yes:  [x] No: N/A at this time    Recommendations:  1. When feasible, restart trickle feeds - ideally Vital AF @ 10-20mL/hr x 24hr. If able to advance, recommend goal rate of 65mL/hr x 24hr to provide 1560mL total volume, 1872kcal, 117g protein, 1265mL free water. Regimen meets 21kcal/kg based on most recent weight 87.2kg (), ~1.7g/kg protein based on IBW 69.8kg.    - Free water flushes to team.  2. Add multivitamin, thiamine, folic acid if feasible via enteral tube access.  3. If pt unable to receive/tolerate EN, consider alternate routes of nutrition (TPN) if medically feasible.   4. Monitor GI tolerance to feeds, RD remains available to adjust TF regimen/formulary as needed/upon request.     Monitoring and Evaluation:   Continue to monitor nutritional intake, tolerance to diet prescription, weights, labs, skin integrity    RD remains available upon request and will follow up per protocol    Mala Han MS, RD, CDN, [facility] Pager #[phone] Nutrition Follow Up Note  Patient seen for: follow up on CTU.     Chart reviewed, events noted. Pt is a 63 YO M with a history of CAD s/p 4v CABG ~ in [facility], DM2 (A1c 7.3%), and HTN who initially presented to OSH with abdominal pain and n/v and found to have pancreatitis with lipase > 3000 though also with elevated troponins. CT abdomen revealed acute pancreatitis and his initial LVEF was 40-45%. He developed MSOF with hypoxic respiratory failure requiring intubation and ERIC and went into hypoxic PEA arrest requiring ACLS and due to persistent hypoxia and hypotension on pressors after ROSC was cannulated to [facility] (LFV, RFA, no anterograde perfusion catheter) on . Notably CTH that day revealed small subdural hemorrhage.    -- Tolerating ECMO weans   -- Pt awaiting LHC/IABP placement   -- Renal function improving    Source: [] Patient       [x] EMR        [x] RN        [] Family at bedside       [] Other:    -If unable to interview patient: [x] Trach/Vent/BiPAP  [] Disoriented/confused/inappropriate to interview    Diet Order:   Diet, NPO (22)    EN Order Provides: n/a    Current Pump Rate: n/a  EN provision: Pt receiving trickle feeds of Vital 1.5 (highest rate 30mL/hr) since . Pt made NPO overnight 2/2 elevated pancreatic enzymes.   - : 510mL    - : 470mL    - : 210mL   - : 110mL  ***Pt meeting <50% EER x > 5 days    Nutrition-related concerns:   - Pt remains intubated, sedated on Precedex.   - A1c 7.3%. Insulin gtt for BG management.   - Hypernatremia noted, FWF 250mL q4H.   - ERIC: pt receiving Bumex   - Antibiotic regimen noted for pancreatitis, ID following   - IVF: NS @ 5mL/hr    GI:  Last BM    - Currently NPO for elevated pancreatic enzymes (Amylase: 422; Lipase: 994), otherwise previously tolerating EN   - Bowel regimen ordered    Weights:   Daily Weight in k.2 (), Weight in k.2 (), Weight in k.3 (), Weight in k.6 (11-27)   - weight trending down, likely 2/2 Bumex and inadequate protein energy intake (currently 5% wt loss x 1 week), will continue to monitor    MEDICATIONS  (STANDING):  argatroban Infusion 0.16 MICROgram(s)/kG/Min (0.89 mL/Hr) IV Continuous <Continuous>  artificial  tears Solution 1 Drop(s) Both EYES four times a day  atorvastatin 40 milliGRAM(s) Oral at bedtime  buMETAnide Injectable 2 milliGRAM(s) IV Push every 12 hours  chlorhexidine 0.12% Liquid 15 milliLiter(s) Oral Mucosa every 12 hours  chlorhexidine 2% Cloths 1 Application(s) Topical <User Schedule>  dexMEDEtomidine Infusion 1.5 MICROgram(s)/kG/Hr (34.8 mL/Hr) IV Continuous <Continuous>  dextrose 50% Injectable 50 milliLiter(s) IV Push every 15 minutes  insulin regular Infusion 6 Unit(s)/Hr (6 mL/Hr) IV Continuous <Continuous>  meropenem  IVPB 500 milliGRAM(s) IV Intermittent every 12 hours  metolazone 10 milliGRAM(s) Oral daily  niCARdipine Infusion 2 mG/Hr (10 mL/Hr) IV Continuous <Continuous>  pantoprazole  Injectable 40 milliGRAM(s) IV Push two times a day  polyethylene glycol 3350 17 Gram(s) Oral daily  senna 2 Tablet(s) Oral at bedtime  sodium chloride 0.9%. 1000 milliLiter(s) (5 mL/Hr) IV Continuous <Continuous>    MEDICATIONS  (PRN):  HYDROmorphone  Injectable 0.5 milliGRAM(s) IV Push every 4 hours PRN Severe Pain (7 - 10)  sodium chloride 0.9% lock flush 10 milliLiter(s) IV Push every 1 hour PRN Pre/post blood products, medications, blood draw, and to maintain line patency    Pertinent Labs:  @ 01:08: Na 151<H>, BUN 70<H>, Cr 3.01<H>, <H>, K+ 4.0, Phos 3.1, Mg 2.4, Alk Phos 102, ALT/SGPT 10, AST/SGOT 41<H>, HbA1c --   @ 13:10: Na 154<H>, BUN 69<H>, Cr 3.02<H>, <H>, K+ 3.6, Phos --, Mg --, Alk Phos 79, ALT/SGPT 10, AST/SGOT 38, HbA1c --    A1C with Estimated Average Glucose Result: 7.3 % (22 @ 03:58)    Finger Sticks:  POCT Blood Glucose.: 155 mg/dL ( @ 09:38)  POCT Blood Glucose.: 162 mg/dL ( @ 09:17)  POCT Blood Glucose.: 181 mg/dL ( @ 08:03)  POCT Blood Glucose.: 135 mg/dL ( @ 06:47)  POCT Blood Glucose.: 90 mg/dL ( @ 05:55)  POCT Blood Glucose.: 120 mg/dL ( @ 04:47)  POCT Blood Glucose.: 134 mg/dL ( @ 03:49)  POCT Blood Glucose.: 117 mg/dL ( @ 03:00)  POCT Blood Glucose.: 126 mg/dL ( 01:47)  POCT Blood Glucose.: 139 mg/dL ( @ 00:49)  POCT Blood Glucose.: 143 mg/dL ( @ 00:04)  POCT Blood Glucose.: 127 mg/dL ( @ 22:50)  POCT Blood Glucose.: 133 mg/dL ( @ 22:34)  POCT Blood Glucose.: 158 mg/dL ( @ 20:52)  POCT Blood Glucose.: 155 mg/dL ( @ 19:53)  POCT Blood Glucose.: 147 mg/dL ( @ 18:52)  POCT Blood Glucose.: 146 mg/dL ( @ 18:15)  POCT Blood Glucose.: 136 mg/dL ( @ 16:56)  POCT Blood Glucose.: 139 mg/dL ( @ 16:07)  POCT Blood Glucose.: 141 mg/dL ( @ 14:53)  POCT Blood Glucose.: 133 mg/dL ( @ 13:50)  POCT Blood Glucose.: 139 mg/dL ( @ 12:52)  POCT Blood Glucose.: 133 mg/dL ( @ 11:52)  POCT Blood Glucose.: 140 mg/dL ( @ 11:07)    Triglycerides, Serum: 337 mg/dL (22 @ 20:11)  Triglycerides, Serum: 103 mg/dL (22 @ 08:34)    Skin per nursing documentation: No noted pressure injuries as per documentation.   Edema: 2+ generalized    Adjusted based on most recent wt 87.2kg () for energy needs, IBW 69.8kg for protein  Estimated Energy Needs: 1744 - 2180kcal (20 - 25kcal/kg)  Estimated Protein Needs: 112 - 139g protein (1.6 - 2.0g/kg)  Estimated Fluid Needs: per team.  [place] State Equation: 1833kcal ()    Previous Nutrition Diagnosis: Inadequate Protein Energy Intake, Increased Nutrient Needs  Nutrition Diagnosis is: [x] ongoing  [] resolved [] not applicable     New Nutrition Diagnosis: [x] Severe acute malnutrition related to inadequate protein energy intake as evidenced by pt meeting <50% EER x > 5 days, 5% weight loss (lean body mass + fluid), edema likely masking further weight loss.  Goal: Pt to meet >80% of estimated nutritional needs during hospital stay.     Nutrition Care Plan:  [x] In Progress  [] Achieved  [] Not applicable    Nutrition Interventions:     Education Provided:       [] Yes:  [x] No: N/A at this time    Recommendations:  1. When feasible, restart trickle feeds - ideally Vital AF @ 10-20mL/hr x 24hr. If able to advance, recommend goal rate of 65mL/hr x 24hr to provide 1560mL total volume, 1872kcal, 117g protein, 1265mL free water. Regimen meets 21kcal/kg based on most recent weight 87.2kg (), ~1.7g/kg protein based on IBW 69.8kg.    - Free water flushes to team.  2. Add multivitamin, thiamine, folic acid if feasible via enteral tube access.  3. If pt unable to receive/tolerate EN, consider alternate routes of nutrition (TPN) if medically feasible.   4. Monitor GI tolerance to feeds, RD remains available to adjust TF regimen/formulary as needed/upon request.     Monitoring and Evaluation:   Continue to monitor nutritional intake, tolerance to diet prescription, weights, labs, skin integrity    RD remains available upon request and will follow up per protocol    Mala Han MS, RD, CDN, [facility] Pager #[phone] Nutrition Follow Up Note  Patient seen for: follow up on CTU.     Chart reviewed, events noted. Pt is a 61 YO M with a history of CAD s/p 4v CABG ~ in [facility], DM2 (A1c 7.3%), and HTN who initially presented to OSH with abdominal pain and n/v and found to have pancreatitis with lipase > 3000 though also with elevated troponins. CT abdomen revealed acute pancreatitis and his initial LVEF was 40-45%. He developed MSOF with hypoxic respiratory failure requiring intubation and ERIC and went into hypoxic PEA arrest requiring ACLS and due to persistent hypoxia and hypotension on pressors after ROSC was cannulated to [facility] (LFV, RFA, no anterograde perfusion catheter) on . Notably CTH that day revealed small subdural hemorrhage.    -- Tolerating ECMO weans   -- Pt awaiting LHC/IABP placement   -- Renal function improving    Source: [] Patient       [x] EMR        [x] RN        [] Family at bedside       [] Other:    -If unable to interview patient: [x] Trach/Vent/BiPAP  [] Disoriented/confused/inappropriate to interview    Diet Order:   Diet, NPO (22)    EN Order Provides: n/a    Current Pump Rate: n/a  EN provision: Pt receiving trickle feeds of Vital 1.5 (highest rate 30mL/hr) since . Pt made NPO overnight 2/2 elevated pancreatic enzymes.   - : 510mL    - : 470mL    - : 210mL   - : 110mL  ***Pt meeting <50% EER x > 5 days    Nutrition-related concerns:   - Pt remains intubated, sedated on Precedex.   - A1c 7.3%. Insulin gtt for BG management.   - Hypernatremia noted, FWF 250mL q4H.   - ERIC: pt receiving Bumex   - Antibiotic regimen noted for pancreatitis, ID following   - IVF: NS @ 5mL/hr    GI:  Last BM    - Currently NPO for elevated pancreatic enzymes (Amylase: 422; Lipase: 994), otherwise previously tolerating EN   - Bowel regimen ordered    Weights:   Daily Weight in k.2 (), Weight in k.2 (), Weight in k.3 (), Weight in k.6 (11-27)   - weight trending down, likely 2/2 Bumex and inadequate protein energy intake (currently 5% wt loss x 1 week), will continue to monitor    MEDICATIONS  (STANDING):  argatroban Infusion 0.16 MICROgram(s)/kG/Min (0.89 mL/Hr) IV Continuous <Continuous>  artificial  tears Solution 1 Drop(s) Both EYES four times a day  atorvastatin 40 milliGRAM(s) Oral at bedtime  buMETAnide Injectable 2 milliGRAM(s) IV Push every 12 hours  chlorhexidine 0.12% Liquid 15 milliLiter(s) Oral Mucosa every 12 hours  chlorhexidine 2% Cloths 1 Application(s) Topical <User Schedule>  dexMEDEtomidine Infusion 1.5 MICROgram(s)/kG/Hr (34.8 mL/Hr) IV Continuous <Continuous>  dextrose 50% Injectable 50 milliLiter(s) IV Push every 15 minutes  insulin regular Infusion 6 Unit(s)/Hr (6 mL/Hr) IV Continuous <Continuous>  meropenem  IVPB 500 milliGRAM(s) IV Intermittent every 12 hours  metolazone 10 milliGRAM(s) Oral daily  niCARdipine Infusion 2 mG/Hr (10 mL/Hr) IV Continuous <Continuous>  pantoprazole  Injectable 40 milliGRAM(s) IV Push two times a day  polyethylene glycol 3350 17 Gram(s) Oral daily  senna 2 Tablet(s) Oral at bedtime  sodium chloride 0.9%. 1000 milliLiter(s) (5 mL/Hr) IV Continuous <Continuous>    MEDICATIONS  (PRN):  HYDROmorphone  Injectable 0.5 milliGRAM(s) IV Push every 4 hours PRN Severe Pain (7 - 10)  sodium chloride 0.9% lock flush 10 milliLiter(s) IV Push every 1 hour PRN Pre/post blood products, medications, blood draw, and to maintain line patency    Pertinent Labs:  @ 01:08: Na 151<H>, BUN 70<H>, Cr 3.01<H>, <H>, K+ 4.0, Phos 3.1, Mg 2.4, Alk Phos 102, ALT/SGPT 10, AST/SGOT 41<H>, HbA1c --   @ 13:10: Na 154<H>, BUN 69<H>, Cr 3.02<H>, <H>, K+ 3.6, Phos --, Mg --, Alk Phos 79, ALT/SGPT 10, AST/SGOT 38, HbA1c --    A1C with Estimated Average Glucose Result: 7.3 % (22 @ 03:58)    Finger Sticks:  POCT Blood Glucose.: 155 mg/dL ( @ 09:38)  POCT Blood Glucose.: 162 mg/dL ( @ 09:17)  POCT Blood Glucose.: 181 mg/dL ( @ 08:03)  POCT Blood Glucose.: 135 mg/dL ( @ 06:47)  POCT Blood Glucose.: 90 mg/dL ( @ 05:55)  POCT Blood Glucose.: 120 mg/dL ( @ 04:47)  POCT Blood Glucose.: 134 mg/dL ( @ 03:49)  POCT Blood Glucose.: 117 mg/dL ( @ 03:00)  POCT Blood Glucose.: 126 mg/dL ( 01:47)  POCT Blood Glucose.: 139 mg/dL ( @ 00:49)  POCT Blood Glucose.: 143 mg/dL ( @ 00:04)  POCT Blood Glucose.: 127 mg/dL ( @ 22:50)  POCT Blood Glucose.: 133 mg/dL ( @ 22:34)  POCT Blood Glucose.: 158 mg/dL ( @ 20:52)  POCT Blood Glucose.: 155 mg/dL ( @ 19:53)  POCT Blood Glucose.: 147 mg/dL ( @ 18:52)  POCT Blood Glucose.: 146 mg/dL ( @ 18:15)  POCT Blood Glucose.: 136 mg/dL ( @ 16:56)  POCT Blood Glucose.: 139 mg/dL ( @ 16:07)  POCT Blood Glucose.: 141 mg/dL ( @ 14:53)  POCT Blood Glucose.: 133 mg/dL ( @ 13:50)  POCT Blood Glucose.: 139 mg/dL ( @ 12:52)  POCT Blood Glucose.: 133 mg/dL ( @ 11:52)  POCT Blood Glucose.: 140 mg/dL ( @ 11:07)    Triglycerides, Serum: 337 mg/dL (22 @ 20:11)  Triglycerides, Serum: 103 mg/dL (22 @ 08:34)    Skin per nursing documentation: No noted pressure injuries as per documentation.   Edema: 2+ generalized    Adjusted based on most recent wt 87.2kg () for energy needs, IBW 69.8kg for protein  Estimated Energy Needs: 1744 - 2180kcal (20 - 25kcal/kg)  Estimated Protein Needs: 112 - 139g protein (1.6 - 2.0g/kg)  Estimated Fluid Needs: per team.  [place] State Equation: 1833kcal ()    Previous Nutrition Diagnosis: Inadequate Protein Energy Intake, Increased Nutrient Needs  Nutrition Diagnosis is: [x] ongoing  [] resolved [] not applicable     New Nutrition Diagnosis: [x] Severe acute malnutrition related to inadequate protein energy intake as evidenced by pt meeting <50% EER x > 5 days, 5% weight loss (lean body mass + fluid), edema likely masking further weight loss.  Goal: Pt to meet >80% of estimated nutritional needs during hospital stay.     Nutrition Care Plan:  [x] In Progress  [] Achieved  [] Not applicable    Nutrition Interventions:     Education Provided:       [] Yes:  [x] No: N/A at this time    Recommendations:  1. When feasible, restart trickle feeds - ideally Vital AF @ 10-20mL/hr x 24hr. If able to advance, recommend goal rate of 65mL/hr x 24hr to provide 1560mL total volume, 1872kcal, 117g protein, 1265mL free water. Regimen meets 21kcal/kg based on most recent weight 87.2kg (), ~1.7g/kg protein based on IBW 69.8kg.    - Free water flushes to team.  2. Add multivitamin, thiamine, folic acid if feasible via enteral tube access.  3. If pt unable to receive/tolerate EN, consider alternate routes of nutrition (TPN) if medically feasible.   4. Monitor GI tolerance to feeds, RD remains available to adjust TF regimen/formulary as needed/upon request.     Monitoring and Evaluation:   Continue to monitor nutritional intake, tolerance to diet prescription, weights, labs, skin integrity    RD remains available upon request and will follow up per protocol    Mala Han MS, RD, CDN, [facility] Pager #[phone]

## 2022-12-01 NOTE — PROGRESS NOTE ADULT - PROBLEM SELECTOR PLAN 1
ERIC/ ATN in the setting of STEMI, cardiac arrest & cardiogenic shock  SCr on arrival was 2.15; trended down to hector 1.6 on 11/25;  slowly trended up & peaked to 3.95 11/28; now trending down to 3.1 today. Pt currently on bumex 2 mg IV bid today.  Pt is non oliguric. Strict I/O. Monitor labs and urine output. Avoid NSAIDs, RCA and nephrotoxins. Dose medications as per eGFR.

## 2022-12-01 NOTE — PROGRESS NOTE ADULT - SUBJECTIVE AND OBJECTIVE BOX
[facility] DIVISION OF KIDNEY DISEASES AND HYPERTENSION   FOLLOW UP NOTE    --------------------------------------------------------------------------------    HPI:  63 y/o male with PMH of CABG, DM, HTN, HLD presenting as transfer from [place] for c/f STEMI. Course c/b gallstone pancreatitis leading to ARDS and shock & cardiac arrest now on [facility]. Had significant troponin elevation and his LVEF down to 8%. Pt was deemed to be too unstable to have an angiogram and potential PCI due to his co-morbidities & a new subdural bleed. Nephrology called for ERIC. SCr on arrival was 2.15; trended down to hector 1.6 on 11/25; slowly trended up & peaked to 3.95 11/28; now trending down to 3.1 today. Pt is non oliguric.     Patient seen & examined today. Pt is intubated, unable to obtain ROS. Pt is currently on IV Bumex. Pt is nonoliguric.     PAST HISTORY  --------------------------------------------------------------------------------  No significant changes to PMH, PSH, FHx, SHx, unless otherwise noted    ALLERGIES & MEDICATIONS  --------------------------------------------------------------------------------  Allergies    No Known Allergies    Intolerances    Standing Inpatient Medications  argatroban Infusion 0.16 MICROgram(s)/kG/Min IV Continuous <Continuous>  artificial  tears Solution 1 Drop(s) Both EYES four times a day  atorvastatin 40 milliGRAM(s) Oral at bedtime  buMETAnide Injectable 2 milliGRAM(s) IV Push every 12 hours  chlorhexidine 0.12% Liquid 15 milliLiter(s) Oral Mucosa every 12 hours  chlorhexidine 2% Cloths 1 Application(s) Topical <User Schedule>  dexMEDEtomidine Infusion 1.5 MICROgram(s)/kG/Hr IV Continuous <Continuous>  dextrose 50% Injectable 50 milliLiter(s) IV Push every 15 minutes  insulin regular Infusion 6 Unit(s)/Hr IV Continuous <Continuous>  meropenem  IVPB 500 milliGRAM(s) IV Intermittent every 12 hours  metolazone 10 milliGRAM(s) Oral daily  niCARdipine Infusion 2 mG/Hr IV Continuous <Continuous>  pantoprazole  Injectable 40 milliGRAM(s) IV Push two times a day  polyethylene glycol 3350 17 Gram(s) Oral daily  senna 2 Tablet(s) Oral at bedtime  sodium chloride 0.9%. 1000 milliLiter(s) IV Continuous <Continuous>    PRN Inpatient Medications  HYDROmorphone  Injectable 0.5 milliGRAM(s) IV Push every 4 hours PRN  sodium chloride 0.9% lock flush 10 milliLiter(s) IV Push every 1 hour PRN      REVIEW OF SYSTEMS  --------------------------------------------------------------------------------  Unable to obtain    VITALS/PHYSICAL EXAM  --------------------------------------------------------------------------------  T(C): 37.2 (12-01-22 @ 08:00), Max: 37.4 (11-30-22 @ 20:00)  HR: 68 (12-01-22 @ 11:30) (65 - 77)  BP: --  RR: 15 (12-01-22 @ 11:30) (10 - 33)  SpO2: 100% (12-01-22 @ 11:30) (98% - 100%)  Wt(kg): --      11-30-22 @ 07:01  -  12-01-22 @ 07:00  --------------------------------------------------------  IN: 4149.7 mL / OUT: 5675 mL / NET: -1525.3 mL    12-01-22 @ 07:01  -  12-01-22 @ 11:59  --------------------------------------------------------  IN: 473.9 mL / OUT: 1125 mL / NET: -651.1 mL      Physical Exam:  	Gen: elderly male intubated  	HEENT: Anicteric  	Pulm: Fair entry, ECMO circuit+  	CV: S1S2+  	Abd: Soft, +BS  	Ext:  trace LE edema B/L  	Neuro: sedated              : De Dios cath+ with clear urine  	Skin: Warm and dry      LABS/STUDIES  --------------------------------------------------------------------------------              8.1    11.23 >-----------<  90       [12-01-22 @ 01:08]              24.7     151  |  119  |  70  ----------------------------<  139      [12-01-22 @ 01:08]  4.0   |  19  |  3.01        Ca     8.1     [12-01-22 @ 01:08]      Mg     2.4     [12-01-22 @ 01:08]      Phos  3.1     [12-01-22 @ 01:08]    Creatinine Trend:  SCr 3.01 [12-01 @ 01:08]  SCr 3.02 [11-30 @ 13:10]  SCr 3.10 [11-30 @ 00:43]  SCr 3.20 [11-29 @ 18:28]  SCr 3.45 [11-29 @ 00:26]    Urinalysis - [11-25-22 @ 10:37]      Color Yellow / Appearance Slightly Turbid / SG 1.027 / pH 6.0      Gluc Negative / Ketone Trace  / Bili Negative / Urobili Negative       Blood Moderate / Protein 30 mg/dL / Leuk Est Negative / Nitrite Negative      RBC 7 / WBC 7 / Hyaline 4 / Gran  / Sq Epi  / Non Sq Epi 2 / Bacteria Negative     [facility] DIVISION OF KIDNEY DISEASES AND HYPERTENSION   FOLLOW UP NOTE    --------------------------------------------------------------------------------    HPI:  61 y/o male with PMH of CABG, DM, HTN, HLD presenting as transfer from [place] for c/f STEMI. Course c/b gallstone pancreatitis leading to ARDS and shock & cardiac arrest now on [facility]. Had significant troponin elevation and his LVEF down to 8%. Pt was deemed to be too unstable to have an angiogram and potential PCI due to his co-morbidities & a new subdural bleed. Nephrology called for ERIC. SCr on arrival was 2.15; trended down to hector 1.6 on 11/25; slowly trended up & peaked to 3.95 11/28; now trending down to 3.1 today. Pt is non oliguric.     Patient seen & examined today. Pt is intubated, unable to obtain ROS. Pt is currently on IV Bumex. Pt is nonoliguric.     PAST HISTORY  --------------------------------------------------------------------------------  No significant changes to PMH, PSH, FHx, SHx, unless otherwise noted    ALLERGIES & MEDICATIONS  --------------------------------------------------------------------------------  Allergies    No Known Allergies    Intolerances    Standing Inpatient Medications  argatroban Infusion 0.16 MICROgram(s)/kG/Min IV Continuous <Continuous>  artificial  tears Solution 1 Drop(s) Both EYES four times a day  atorvastatin 40 milliGRAM(s) Oral at bedtime  buMETAnide Injectable 2 milliGRAM(s) IV Push every 12 hours  chlorhexidine 0.12% Liquid 15 milliLiter(s) Oral Mucosa every 12 hours  chlorhexidine 2% Cloths 1 Application(s) Topical <User Schedule>  dexMEDEtomidine Infusion 1.5 MICROgram(s)/kG/Hr IV Continuous <Continuous>  dextrose 50% Injectable 50 milliLiter(s) IV Push every 15 minutes  insulin regular Infusion 6 Unit(s)/Hr IV Continuous <Continuous>  meropenem  IVPB 500 milliGRAM(s) IV Intermittent every 12 hours  metolazone 10 milliGRAM(s) Oral daily  niCARdipine Infusion 2 mG/Hr IV Continuous <Continuous>  pantoprazole  Injectable 40 milliGRAM(s) IV Push two times a day  polyethylene glycol 3350 17 Gram(s) Oral daily  senna 2 Tablet(s) Oral at bedtime  sodium chloride 0.9%. 1000 milliLiter(s) IV Continuous <Continuous>    PRN Inpatient Medications  HYDROmorphone  Injectable 0.5 milliGRAM(s) IV Push every 4 hours PRN  sodium chloride 0.9% lock flush 10 milliLiter(s) IV Push every 1 hour PRN      REVIEW OF SYSTEMS  --------------------------------------------------------------------------------  Unable to obtain    VITALS/PHYSICAL EXAM  --------------------------------------------------------------------------------  T(C): 37.2 (12-01-22 @ 08:00), Max: 37.4 (11-30-22 @ 20:00)  HR: 68 (12-01-22 @ 11:30) (65 - 77)  BP: --  RR: 15 (12-01-22 @ 11:30) (10 - 33)  SpO2: 100% (12-01-22 @ 11:30) (98% - 100%)  Wt(kg): --      11-30-22 @ 07:01  -  12-01-22 @ 07:00  --------------------------------------------------------  IN: 4149.7 mL / OUT: 5675 mL / NET: -1525.3 mL    12-01-22 @ 07:01  -  12-01-22 @ 11:59  --------------------------------------------------------  IN: 473.9 mL / OUT: 1125 mL / NET: -651.1 mL      Physical Exam:  	Gen: elderly male intubated  	HEENT: Anicteric  	Pulm: Fair entry, ECMO circuit+  	CV: S1S2+  	Abd: Soft, +BS  	Ext:  trace LE edema B/L  	Neuro: sedated              : De Dios cath+ with clear urine  	Skin: Warm and dry      LABS/STUDIES  --------------------------------------------------------------------------------              8.1    11.23 >-----------<  90       [12-01-22 @ 01:08]              24.7     151  |  119  |  70  ----------------------------<  139      [12-01-22 @ 01:08]  4.0   |  19  |  3.01        Ca     8.1     [12-01-22 @ 01:08]      Mg     2.4     [12-01-22 @ 01:08]      Phos  3.1     [12-01-22 @ 01:08]    Creatinine Trend:  SCr 3.01 [12-01 @ 01:08]  SCr 3.02 [11-30 @ 13:10]  SCr 3.10 [11-30 @ 00:43]  SCr 3.20 [11-29 @ 18:28]  SCr 3.45 [11-29 @ 00:26]    Urinalysis - [11-25-22 @ 10:37]      Color Yellow / Appearance Slightly Turbid / SG 1.027 / pH 6.0      Gluc Negative / Ketone Trace  / Bili Negative / Urobili Negative       Blood Moderate / Protein 30 mg/dL / Leuk Est Negative / Nitrite Negative      RBC 7 / WBC 7 / Hyaline 4 / Gran  / Sq Epi  / Non Sq Epi 2 / Bacteria Negative     [facility] DIVISION OF KIDNEY DISEASES AND HYPERTENSION   FOLLOW UP NOTE    --------------------------------------------------------------------------------    HPI:  61 y/o male with PMH of CABG, DM, HTN, HLD presenting as transfer from [place] for c/f STEMI. Course c/b gallstone pancreatitis leading to ARDS and shock & cardiac arrest now on [facility]. Had significant troponin elevation and his LVEF down to 8%. Pt was deemed to be too unstable to have an angiogram and potential PCI due to his co-morbidities & a new subdural bleed. Nephrology called for ERIC. SCr on arrival was 2.15; trended down to hector 1.6 on 11/25; slowly trended up & peaked to 3.95 11/28; now trending down to 3.1 today. Pt is non oliguric.     Patient seen & examined today. Pt is intubated, unable to obtain ROS. Pt is currently on IV Bumex. Pt is nonoliguric.     PAST HISTORY  --------------------------------------------------------------------------------  No significant changes to PMH, PSH, FHx, SHx, unless otherwise noted    ALLERGIES & MEDICATIONS  --------------------------------------------------------------------------------  Allergies    No Known Allergies    Intolerances    Standing Inpatient Medications  argatroban Infusion 0.16 MICROgram(s)/kG/Min IV Continuous <Continuous>  artificial  tears Solution 1 Drop(s) Both EYES four times a day  atorvastatin 40 milliGRAM(s) Oral at bedtime  buMETAnide Injectable 2 milliGRAM(s) IV Push every 12 hours  chlorhexidine 0.12% Liquid 15 milliLiter(s) Oral Mucosa every 12 hours  chlorhexidine 2% Cloths 1 Application(s) Topical <User Schedule>  dexMEDEtomidine Infusion 1.5 MICROgram(s)/kG/Hr IV Continuous <Continuous>  dextrose 50% Injectable 50 milliLiter(s) IV Push every 15 minutes  insulin regular Infusion 6 Unit(s)/Hr IV Continuous <Continuous>  meropenem  IVPB 500 milliGRAM(s) IV Intermittent every 12 hours  metolazone 10 milliGRAM(s) Oral daily  niCARdipine Infusion 2 mG/Hr IV Continuous <Continuous>  pantoprazole  Injectable 40 milliGRAM(s) IV Push two times a day  polyethylene glycol 3350 17 Gram(s) Oral daily  senna 2 Tablet(s) Oral at bedtime  sodium chloride 0.9%. 1000 milliLiter(s) IV Continuous <Continuous>    PRN Inpatient Medications  HYDROmorphone  Injectable 0.5 milliGRAM(s) IV Push every 4 hours PRN  sodium chloride 0.9% lock flush 10 milliLiter(s) IV Push every 1 hour PRN      REVIEW OF SYSTEMS  --------------------------------------------------------------------------------  Unable to obtain    VITALS/PHYSICAL EXAM  --------------------------------------------------------------------------------  T(C): 37.2 (12-01-22 @ 08:00), Max: 37.4 (11-30-22 @ 20:00)  HR: 68 (12-01-22 @ 11:30) (65 - 77)  BP: --  RR: 15 (12-01-22 @ 11:30) (10 - 33)  SpO2: 100% (12-01-22 @ 11:30) (98% - 100%)  Wt(kg): --      11-30-22 @ 07:01  -  12-01-22 @ 07:00  --------------------------------------------------------  IN: 4149.7 mL / OUT: 5675 mL / NET: -1525.3 mL    12-01-22 @ 07:01  -  12-01-22 @ 11:59  --------------------------------------------------------  IN: 473.9 mL / OUT: 1125 mL / NET: -651.1 mL      Physical Exam:  	Gen: elderly male intubated  	HEENT: Anicteric  	Pulm: Fair entry, ECMO circuit+  	CV: S1S2+  	Abd: Soft, +BS  	Ext:  trace LE edema B/L  	Neuro: sedated              : De Dios cath+ with clear urine  	Skin: Warm and dry      LABS/STUDIES  --------------------------------------------------------------------------------              8.1    11.23 >-----------<  90       [12-01-22 @ 01:08]              24.7     151  |  119  |  70  ----------------------------<  139      [12-01-22 @ 01:08]  4.0   |  19  |  3.01        Ca     8.1     [12-01-22 @ 01:08]      Mg     2.4     [12-01-22 @ 01:08]      Phos  3.1     [12-01-22 @ 01:08]    Creatinine Trend:  SCr 3.01 [12-01 @ 01:08]  SCr 3.02 [11-30 @ 13:10]  SCr 3.10 [11-30 @ 00:43]  SCr 3.20 [11-29 @ 18:28]  SCr 3.45 [11-29 @ 00:26]    Urinalysis - [11-25-22 @ 10:37]      Color Yellow / Appearance Slightly Turbid / SG 1.027 / pH 6.0      Gluc Negative / Ketone Trace  / Bili Negative / Urobili Negative       Blood Moderate / Protein 30 mg/dL / Leuk Est Negative / Nitrite Negative      RBC 7 / WBC 7 / Hyaline 4 / Gran  / Sq Epi  / Non Sq Epi 2 / Bacteria Negative

## 2022-12-01 NOTE — PROGRESS NOTE ADULT - ATTENDING COMMENTS
Melissa Mercer MD  Off: [phone]  contact me on teams    (After 5 pm or on weekends please page the on-call fellow/attending, can check [email or web address] for schedule. Login is carmen cantor, schedule under [facility] medicine, psych, derm) Melissa Mercer MD  Off: [phone]  contact me on teams    (After 5 pm or on weekends please page the on-call fellow/attending, can check [email or web address] for schedule. Login is carmen cantor, schedule under [facility] medicine, psych, derm) Melissa Mercer MD  Off: [phone]  contact me on teams    (After 5 pm or on weekends please page the on-call fellow/attending, can check [email or web address] for schedule. Login is carmen cantor, schedule under [facility] medicine, psych, derm)

## 2022-12-01 NOTE — PROGRESS NOTE ADULT - SUBJECTIVE AND OBJECTIVE BOX
infectious diseases progress note:    Patient is a 62y old  Male who presents with a chief complaint of Acute cholecystitis, gallstone pancreatitis (01 Dec 2022 07:43)        Acute pancreatitis without infection or necrosis           s    Allergies    No Known Allergies    Intolerances        ANTIBIOTICS/RELEVANT:  antimicrobials  meropenem  IVPB 500 milliGRAM(s) IV Intermittent every 12 hours    immunologic:    OTHER:  argatroban Infusion 0.16 MICROgram(s)/kG/Min IV Continuous <Continuous>  artificial  tears Solution 1 Drop(s) Both EYES four times a day  atorvastatin 40 milliGRAM(s) Oral at bedtime  buMETAnide Injectable 2 milliGRAM(s) IV Push every 12 hours  calcium gluconate IVPB 1 Gram(s) IV Intermittent once  chlorhexidine 0.12% Liquid 15 milliLiter(s) Oral Mucosa every 12 hours  chlorhexidine 2% Cloths 1 Application(s) Topical <User Schedule>  dexMEDEtomidine Infusion 1.5 MICROgram(s)/kG/Hr IV Continuous <Continuous>  dextrose 50% Injectable 50 milliLiter(s) IV Push every 15 minutes  insulin regular Infusion 6 Unit(s)/Hr IV Continuous <Continuous>  metolazone 10 milliGRAM(s) Oral daily  niCARdipine Infusion 2 mG/Hr IV Continuous <Continuous>  pantoprazole  Injectable 40 milliGRAM(s) IV Push two times a day  polyethylene glycol 3350 17 Gram(s) Oral daily  senna 2 Tablet(s) Oral at bedtime  sodium chloride 0.9% lock flush 10 milliLiter(s) IV Push every 1 hour PRN  sodium chloride 0.9%. 1000 milliLiter(s) IV Continuous <Continuous>      Objective:  Vital Signs Last 24 Hrs  T(C): 37.2 (01 Dec 2022 08:00), Max: 37.4 (30 Nov 2022 20:00)  T(F): 99 (01 Dec 2022 08:00), Max: 99.3 (30 Nov 2022 20:00)  HR: 71 (01 Dec 2022 09:00) (65 - 77)  BP: --  BP(mean): --  RR: 19 (01 Dec 2022 09:00) (10 - 33)  SpO2: 100% (01 Dec 2022 09:00) (98% - 100%)    Parameters below as of 01 Dec 2022 08:00  Patient On (Oxygen Delivery Method): ventilator    O2 Concentration (%): 40       Eyes:INOCENCIO, EOMI  Ear/Nose/Throat: no oral lesion, no sinus tenderness on percussion	  Neck:no JVD, no lymphadenopathy, supple  Respiratory: CTA lore  Cardiovascular: S1S2 RRR, no murmurs  Gastrointestinal:soft, (+) BS, no HSM  Extremities:no e/e/c        LABS:                        8.1    11.23 )-----------( 90       ( 01 Dec 2022 01:08 )             24.7     12-01    151<H>  |  119<H>  |  70<H>  ----------------------------<  139<H>  4.0   |  19<L>  |  3.01<H>    Ca    8.1<L>      01 Dec 2022 01:08  Phos  3.1     12-01  Mg     2.4     12-01    TPro  5.9<L>  /  Alb  3.2<L>  /  TBili  0.8  /  DBili  x   /  AST  41<H>  /  ALT  10  /  AlkPhos  102  12-01    PT/INR - ( 01 Dec 2022 01:08 )   PT: 15.3 sec;   INR: 1.33 ratio         PTT - ( 01 Dec 2022 06:25 )  PTT:58.2 sec        MICROBIOLOGY:    RECENT CULTURES:  11-30 @ 13:10 .Bronchial Bronchial Lavage       Rare polymorphonuclear leukocytes per low power field  No squamous epithelial cells per low power field  No organisms seen             11-28 @ 15:24 .Blood Blood-Peripheral                No growth to date.    11-26 @ 18:11 ET Tube ET Tube       Moderate polymorphonuclear leukocytes per low power field  No Squamous epithelial cells per low power field  No organisms seen per oil power field           No growth at 48 hours    11-25 @ 22:55 ET Tube ET Tube       No polymorphonuclear leukocytes seen per low power field  No Squamous epithelial cells seen per low power field  No organisms seen per oil power field           No growth at 48 hours    11-25 @ 22:10 .Blood Blood-Peripheral                No Growth Final    11-25 @ 21:15 .Blood Blood-Peripheral                No Growth Final    11-25 @ 10:39 .Blood Blood-Peripheral                No Growth Final    11-25 @ 10:37 .Blood Blood-Peripheral                No Growth Final          RESPIRATORY CULTURES:              RADIOLOGY & ADDITIONAL STUDIES:        Pager [phone]  After 5 pm/weekends or if no response :[phone]  infectious diseases progress note:    Patient is a 62y old  Male who presents with a chief complaint of Acute cholecystitis, gallstone pancreatitis (01 Dec 2022 07:43)        Acute pancreatitis without infection or necrosis           s    Allergies    No Known Allergies    Intolerances        ANTIBIOTICS/RELEVANT:  antimicrobials  meropenem  IVPB 500 milliGRAM(s) IV Intermittent every 12 hours    immunologic:    OTHER:  argatroban Infusion 0.16 MICROgram(s)/kG/Min IV Continuous <Continuous>  artificial  tears Solution 1 Drop(s) Both EYES four times a day  atorvastatin 40 milliGRAM(s) Oral at bedtime  buMETAnide Injectable 2 milliGRAM(s) IV Push every 12 hours  calcium gluconate IVPB 1 Gram(s) IV Intermittent once  chlorhexidine 0.12% Liquid 15 milliLiter(s) Oral Mucosa every 12 hours  chlorhexidine 2% Cloths 1 Application(s) Topical <User Schedule>  dexMEDEtomidine Infusion 1.5 MICROgram(s)/kG/Hr IV Continuous <Continuous>  dextrose 50% Injectable 50 milliLiter(s) IV Push every 15 minutes  insulin regular Infusion 6 Unit(s)/Hr IV Continuous <Continuous>  metolazone 10 milliGRAM(s) Oral daily  niCARdipine Infusion 2 mG/Hr IV Continuous <Continuous>  pantoprazole  Injectable 40 milliGRAM(s) IV Push two times a day  polyethylene glycol 3350 17 Gram(s) Oral daily  senna 2 Tablet(s) Oral at bedtime  sodium chloride 0.9% lock flush 10 milliLiter(s) IV Push every 1 hour PRN  sodium chloride 0.9%. 1000 milliLiter(s) IV Continuous <Continuous>      Objective:  Vital Signs Last 24 Hrs  T(C): 37.2 (01 Dec 2022 08:00), Max: 37.4 (30 Nov 2022 20:00)  T(F): 99 (01 Dec 2022 08:00), Max: 99.3 (30 Nov 2022 20:00)  HR: 71 (01 Dec 2022 09:00) (65 - 77)  BP: --  BP(mean): --  RR: 19 (01 Dec 2022 09:00) (10 - 33)  SpO2: 100% (01 Dec 2022 09:00) (98% - 100%)    Parameters below as of 01 Dec 2022 08:00  Patient On (Oxygen Delivery Method): ventilator    O2 Concentration (%): 40       Eyes:INOCENCIO, EOMI  Ear/Nose/Throat: no oral lesion, no sinus tenderness on percussion	  Neck:no JVD, no lymphadenopathy, supple  Respiratory: CTA lore  Cardiovascular: S1S2 RRR, no murmurs  Gastrointestinal:soft, (+) BS, no HSM  Extremities:no e/e/c        LABS:                        8.1    11.23 )-----------( 90       ( 01 Dec 2022 01:08 )             24.7     12-01    151<H>  |  119<H>  |  70<H>  ----------------------------<  139<H>  4.0   |  19<L>  |  3.01<H>    Ca    8.1<L>      01 Dec 2022 01:08  Phos  3.1     12-01  Mg     2.4     12-01    TPro  5.9<L>  /  Alb  3.2<L>  /  TBili  0.8  /  DBili  x   /  AST  41<H>  /  ALT  10  /  AlkPhos  102  12-01    PT/INR - ( 01 Dec 2022 01:08 )   PT: 15.3 sec;   INR: 1.33 ratio         PTT - ( 01 Dec 2022 06:25 )  PTT:58.2 sec        MICROBIOLOGY:    RECENT CULTURES:  11-30 @ 13:10 .Bronchial Bronchial Lavage       Rare polymorphonuclear leukocytes per low power field  No squamous epithelial cells per low power field  No organisms seen             11-28 @ 15:24 .Blood Blood-Peripheral                No growth to date.    11-26 @ 18:11 ET Tube ET Tube       Moderate polymorphonuclear leukocytes per low power field  No Squamous epithelial cells per low power field  No organisms seen per oil power field           No growth at 48 hours    11-25 @ 22:55 ET Tube ET Tube       No polymorphonuclear leukocytes seen per low power field  No Squamous epithelial cells seen per low power field  No organisms seen per oil power field           No growth at 48 hours    11-25 @ 22:10 .Blood Blood-Peripheral                No Growth Final    11-25 @ 21:15 .Blood Blood-Peripheral                No Growth Final    11-25 @ 10:39 .Blood Blood-Peripheral                No Growth Final    11-25 @ 10:37 .Blood Blood-Peripheral                No Growth Final          RESPIRATORY CULTURES:              RADIOLOGY & ADDITIONAL STUDIES:        Pager [phone]  After 5 pm/weekends or if no response :[phone]  infectious diseases progress note:    Patient is a 62y old  Male who presents with a chief complaint of Acute cholecystitis, gallstone pancreatitis (01 Dec 2022 07:43)        Acute pancreatitis without infection or necrosis           s    Allergies    No Known Allergies    Intolerances        ANTIBIOTICS/RELEVANT:  antimicrobials  meropenem  IVPB 500 milliGRAM(s) IV Intermittent every 12 hours    immunologic:    OTHER:  argatroban Infusion 0.16 MICROgram(s)/kG/Min IV Continuous <Continuous>  artificial  tears Solution 1 Drop(s) Both EYES four times a day  atorvastatin 40 milliGRAM(s) Oral at bedtime  buMETAnide Injectable 2 milliGRAM(s) IV Push every 12 hours  calcium gluconate IVPB 1 Gram(s) IV Intermittent once  chlorhexidine 0.12% Liquid 15 milliLiter(s) Oral Mucosa every 12 hours  chlorhexidine 2% Cloths 1 Application(s) Topical <User Schedule>  dexMEDEtomidine Infusion 1.5 MICROgram(s)/kG/Hr IV Continuous <Continuous>  dextrose 50% Injectable 50 milliLiter(s) IV Push every 15 minutes  insulin regular Infusion 6 Unit(s)/Hr IV Continuous <Continuous>  metolazone 10 milliGRAM(s) Oral daily  niCARdipine Infusion 2 mG/Hr IV Continuous <Continuous>  pantoprazole  Injectable 40 milliGRAM(s) IV Push two times a day  polyethylene glycol 3350 17 Gram(s) Oral daily  senna 2 Tablet(s) Oral at bedtime  sodium chloride 0.9% lock flush 10 milliLiter(s) IV Push every 1 hour PRN  sodium chloride 0.9%. 1000 milliLiter(s) IV Continuous <Continuous>      Objective:  Vital Signs Last 24 Hrs  T(C): 37.2 (01 Dec 2022 08:00), Max: 37.4 (30 Nov 2022 20:00)  T(F): 99 (01 Dec 2022 08:00), Max: 99.3 (30 Nov 2022 20:00)  HR: 71 (01 Dec 2022 09:00) (65 - 77)  BP: --  BP(mean): --  RR: 19 (01 Dec 2022 09:00) (10 - 33)  SpO2: 100% (01 Dec 2022 09:00) (98% - 100%)    Parameters below as of 01 Dec 2022 08:00  Patient On (Oxygen Delivery Method): ventilator    O2 Concentration (%): 40       Eyes:INOCENCIO, EOMI  Ear/Nose/Throat: no oral lesion, no sinus tenderness on percussion	  Neck:no JVD, no lymphadenopathy, supple  Respiratory: CTA lore  Cardiovascular: S1S2 RRR, no murmurs  Gastrointestinal:soft, (+) BS, no HSM  Extremities:no e/e/c        LABS:                        8.1    11.23 )-----------( 90       ( 01 Dec 2022 01:08 )             24.7     12-01    151<H>  |  119<H>  |  70<H>  ----------------------------<  139<H>  4.0   |  19<L>  |  3.01<H>    Ca    8.1<L>      01 Dec 2022 01:08  Phos  3.1     12-01  Mg     2.4     12-01    TPro  5.9<L>  /  Alb  3.2<L>  /  TBili  0.8  /  DBili  x   /  AST  41<H>  /  ALT  10  /  AlkPhos  102  12-01    PT/INR - ( 01 Dec 2022 01:08 )   PT: 15.3 sec;   INR: 1.33 ratio         PTT - ( 01 Dec 2022 06:25 )  PTT:58.2 sec        MICROBIOLOGY:    RECENT CULTURES:  11-30 @ 13:10 .Bronchial Bronchial Lavage       Rare polymorphonuclear leukocytes per low power field  No squamous epithelial cells per low power field  No organisms seen             11-28 @ 15:24 .Blood Blood-Peripheral                No growth to date.    11-26 @ 18:11 ET Tube ET Tube       Moderate polymorphonuclear leukocytes per low power field  No Squamous epithelial cells per low power field  No organisms seen per oil power field           No growth at 48 hours    11-25 @ 22:55 ET Tube ET Tube       No polymorphonuclear leukocytes seen per low power field  No Squamous epithelial cells seen per low power field  No organisms seen per oil power field           No growth at 48 hours    11-25 @ 22:10 .Blood Blood-Peripheral                No Growth Final    11-25 @ 21:15 .Blood Blood-Peripheral                No Growth Final    11-25 @ 10:39 .Blood Blood-Peripheral                No Growth Final    11-25 @ 10:37 .Blood Blood-Peripheral                No Growth Final          RESPIRATORY CULTURES:              RADIOLOGY & ADDITIONAL STUDIES:        Pager [phone]  After 5 pm/weekends or if no response :[phone]

## 2022-12-01 NOTE — PROGRESS NOTE ADULT - SUBJECTIVE AND OBJECTIVE BOX
Patient seen and examined at the bedside.    Remained critically ill on continuous ICU monitoring.    OBJECTIVE:  Vital Signs Last 24 Hrs  T(C): 37.3 (01 Dec 2022 20:00), Max: 37.3 (01 Dec 2022 20:00)  T(F): 99.1 (01 Dec 2022 20:00), Max: 99.1 (01 Dec 2022 20:00)  HR: 74 (01 Dec 2022 22:00) (63 - 77)  BP: --  BP(mean): --  RR: 16 (01 Dec 2022 22:00) (5 - 27)  SpO2: 100% (01 Dec 2022 22:00) (98% - 100%)    Parameters below as of 01 Dec 2022 20:00  Patient On (Oxygen Delivery Method): ventilator    O2 Concentration (%): 40    Physical Exam:   General: Intubated, multiple lines gtt  Neurology: sedated   Eyes: bilateral pupils equal and reactive   ENT/Neck: +ETT midline, Neck supple, trachea midline, No JVD   Respiratory: rhonchi bilaterally   CV: S1S2, no murmurs        [x] Sinus rhythm  Abdominal: Soft, NT, ND +BS   Extremities: 1-2+ pedal edema noted, + peripheral pulses   Skin: No Rashes, Hematoma, Ecchymosis                              Assessment:  63 y/o male with PMH of CABG, DM, HTN, HLD presenting as transfer from [place] for c/f STEMI. PTA arrival received 325 aspirin, 600 plavix, and no heparin drip started. Around 1:30 am patient had multiple episodes of non-bloody diarrhea and emesis with associated abdominal pain and chest pain, unable to localize, non-radiating, with associated SOB and no associated palpitations or diaphoresis. No recent fevers/chills, cough, rashes, or changes in urination. Does report mild diffuse back pain; started 5 days ago in setting on injury while walking and lifting objects. Denies focal weakness, numbness/tingling, or LOC due does report mild dizziness.    Cardiac arrest s/p [facility] ECMO 11/25  Hemodynamic instability  Hypovolemia  Post op respiratory insufficiency  Thrombocytopenia  Acute blood loss anemia  Leukocytosis       Plan:   ***Neuro***  CT head showed 4mm subdural hematoma 11/26: repeat CT head unchanged   [x] Sedated with [x] Precedex   Post operative neuro assessment   Dilaudid for pain    ***Cardiovascular***  11/30 TTE: EF 30-35%, Severe global LV dysfunction  Invasive hemodynamic monitoring, assess perfusion indices   SR / CVP 8/ MAP 79/ PAP 17/ CI 1.6/ Hct 24.7/ Lactate 0.7  [x] Cardene 2mg   [x] ECMO- 3200 rpm, 3.37 flow  Reassessment of hemodynamics post resuscitation   [x] Statin  [x] AC Therapy with Argatroban gtt  Stress dose steroids decreased to BID today, will continue to wean      ***Pulmonary***  [x]  Nitric oxide- 20 ppm   Post op vent management   Titration of FiO2 and PEEP, follow SpO2, CXR, blood gasses   100% FiO2 on ECMO    Mode: AC/ CMV (Assist Control/ Continuous Mandatory Ventilation)  RR (machine): 14  FiO2: 50  PEEP: 10  ITime: 1  MAP: 17  PC: 10  PIP: 23            ***GI***  [x] NPO  [x] Protonix    Bowel regimen with Miralax and senna       ***Renal***  Continue to monitor I/Os, BUN/Creatinine.   Replete lytes PRN  De Dios present  Continue diuresis with Bumex goal 2L negative by morning   C/w Free water 300 q4  Zaroxolyn added for hypernatremia        ***ID***  Meropenem for empiric coverage  Follow up cultures    ***Endocrine***  [x]  DM2: HbA1c 7.3%                - [x] Insulin gtt              - Need tight glycemic control to prevent wound infection.      Patient requires continuous monitoring with bedside rhythm monitoring, pulse oximetry monitoring, and continuous central venous and arterial pressure monitoring; and intermittent blood gas analysis. Care plan discussed with the ICU care team.   Patient remained critical, at risk for life threatening decompensation.    I have spent 30 minutes providing critical care management to this patient.    By signing my name below, I, Erin Palafox, attest that this documentation has been prepared under the direction and in the presence of Jeffrey Alaniz NP.  Electronically signed: Brian Hunt, 12-01-22 @ 22:25    I, Jeffrey Alaniz , personally performed the services described in this documentation. all medical record entries made by the karlaibkyle were at my direction and in my presence. I have reviewed the chart and agree that the record reflects my personal performance and is accurate and complete  Electronically signed: Jeffrey Alaniz NP. Patient seen and examined at the bedside.    Remained critically ill on continuous ICU monitoring.    OBJECTIVE:  Vital Signs Last 24 Hrs  T(C): 37.3 (01 Dec 2022 20:00), Max: 37.3 (01 Dec 2022 20:00)  T(F): 99.1 (01 Dec 2022 20:00), Max: 99.1 (01 Dec 2022 20:00)  HR: 74 (01 Dec 2022 22:00) (63 - 77)  BP: --  BP(mean): --  RR: 16 (01 Dec 2022 22:00) (5 - 27)  SpO2: 100% (01 Dec 2022 22:00) (98% - 100%)    Parameters below as of 01 Dec 2022 20:00  Patient On (Oxygen Delivery Method): ventilator    O2 Concentration (%): 40    Physical Exam:   General: Intubated, multiple lines gtt  Neurology: sedated   Eyes: bilateral pupils equal and reactive   ENT/Neck: +ETT midline, Neck supple, trachea midline, No JVD   Respiratory: rhonchi bilaterally   CV: S1S2, no murmurs        [x] Sinus rhythm  Abdominal: Soft, NT, ND +BS   Extremities: 1-2+ pedal edema noted, + peripheral pulses   Skin: No Rashes, Hematoma, Ecchymosis                              Assessment:  61 y/o male with PMH of CABG, DM, HTN, HLD presenting as transfer from [place] for c/f STEMI. PTA arrival received 325 aspirin, 600 plavix, and no heparin drip started. Around 1:30 am patient had multiple episodes of non-bloody diarrhea and emesis with associated abdominal pain and chest pain, unable to localize, non-radiating, with associated SOB and no associated palpitations or diaphoresis. No recent fevers/chills, cough, rashes, or changes in urination. Does report mild diffuse back pain; started 5 days ago in setting on injury while walking and lifting objects. Denies focal weakness, numbness/tingling, or LOC due does report mild dizziness.    Cardiac arrest s/p [facility] ECMO 11/25  Hemodynamic instability  Hypovolemia  Post op respiratory insufficiency  Thrombocytopenia  Acute blood loss anemia  Leukocytosis       Plan:   ***Neuro***  CT head showed 4mm subdural hematoma 11/26: repeat CT head unchanged   [x] Sedated with [x] Precedex   Post operative neuro assessment   Dilaudid for pain    ***Cardiovascular***  11/30 TTE: EF 30-35%, Severe global LV dysfunction  Invasive hemodynamic monitoring, assess perfusion indices   SR / CVP 8/ MAP 79/ PAP 17/ CI 1.6/ Hct 24.7/ Lactate 0.7  [x] Cardene 2mg   [x] ECMO- 3200 rpm, 3.37 flow  Reassessment of hemodynamics post resuscitation   [x] Statin  [x] AC Therapy with Argatroban gtt  Stress dose steroids decreased to BID today, will continue to wean      ***Pulmonary***  [x]  Nitric oxide- 20 ppm   Post op vent management   Titration of FiO2 and PEEP, follow SpO2, CXR, blood gasses   100% FiO2 on ECMO    Mode: AC/ CMV (Assist Control/ Continuous Mandatory Ventilation)  RR (machine): 14  FiO2: 50  PEEP: 10  ITime: 1  MAP: 17  PC: 10  PIP: 23            ***GI***  [x] NPO  [x] Protonix    Bowel regimen with Miralax and senna       ***Renal***  Continue to monitor I/Os, BUN/Creatinine.   Replete lytes PRN  De Dios present  Continue diuresis with Bumex goal 2L negative by morning   C/w Free water 300 q4  Zaroxolyn added for hypernatremia        ***ID***  Meropenem for empiric coverage  Follow up cultures    ***Endocrine***  [x]  DM2: HbA1c 7.3%                - [x] Insulin gtt              - Need tight glycemic control to prevent wound infection.      Patient requires continuous monitoring with bedside rhythm monitoring, pulse oximetry monitoring, and continuous central venous and arterial pressure monitoring; and intermittent blood gas analysis. Care plan discussed with the ICU care team.   Patient remained critical, at risk for life threatening decompensation.    I have spent 30 minutes providing critical care management to this patient.    By signing my name below, I, Erin Palafox, attest that this documentation has been prepared under the direction and in the presence of Jeffrey Alaniz NP.  Electronically signed: Brian Hunt, 12-01-22 @ 22:25    I, Jeffrey Alaniz , personally performed the services described in this documentation. all medical record entries made by the karlaibkyle were at my direction and in my presence. I have reviewed the chart and agree that the record reflects my personal performance and is accurate and complete  Electronically signed: Jeffrey Alaniz NP. Patient seen and examined at the bedside.    Remained critically ill on continuous ICU monitoring.    OBJECTIVE:  Vital Signs Last 24 Hrs  T(C): 37.3 (01 Dec 2022 20:00), Max: 37.3 (01 Dec 2022 20:00)  T(F): 99.1 (01 Dec 2022 20:00), Max: 99.1 (01 Dec 2022 20:00)  HR: 74 (01 Dec 2022 22:00) (63 - 77)  BP: --  BP(mean): --  RR: 16 (01 Dec 2022 22:00) (5 - 27)  SpO2: 100% (01 Dec 2022 22:00) (98% - 100%)    Parameters below as of 01 Dec 2022 20:00  Patient On (Oxygen Delivery Method): ventilator    O2 Concentration (%): 40    Physical Exam:   General: Intubated, multiple lines gtt  Neurology: sedated   Eyes: bilateral pupils equal and reactive   ENT/Neck: +ETT midline, Neck supple, trachea midline, No JVD   Respiratory: rhonchi bilaterally   CV: S1S2, no murmurs        [x] Sinus rhythm  Abdominal: Soft, NT, ND +BS   Extremities: 1-2+ pedal edema noted, + peripheral pulses   Skin: No Rashes, Hematoma, Ecchymosis                              Assessment:  61 y/o male with PMH of CABG, DM, HTN, HLD presenting as transfer from [place] for c/f STEMI. PTA arrival received 325 aspirin, 600 plavix, and no heparin drip started. Around 1:30 am patient had multiple episodes of non-bloody diarrhea and emesis with associated abdominal pain and chest pain, unable to localize, non-radiating, with associated SOB and no associated palpitations or diaphoresis. No recent fevers/chills, cough, rashes, or changes in urination. Does report mild diffuse back pain; started 5 days ago in setting on injury while walking and lifting objects. Denies focal weakness, numbness/tingling, or LOC due does report mild dizziness.    Cardiac arrest s/p [facility] ECMO 11/25  Hemodynamic instability  Hypovolemia  Post op respiratory insufficiency  Thrombocytopenia  Acute blood loss anemia  Leukocytosis       Plan:   ***Neuro***  CT head showed 4mm subdural hematoma 11/26: repeat CT head unchanged   [x] Sedated with [x] Precedex   Post operative neuro assessment   Dilaudid for pain    ***Cardiovascular***  11/30 TTE: EF 30-35%, Severe global LV dysfunction  Invasive hemodynamic monitoring, assess perfusion indices   SR / CVP 8/ MAP 79/ PAP 17/ CI 1.6/ Hct 24.7/ Lactate 0.7  [x] Cardene 2mg   [x] ECMO- 3200 rpm, 3.37 flow  Reassessment of hemodynamics post resuscitation   [x] Statin  [x] AC Therapy with Argatroban gtt  Stress dose steroids decreased to BID today, will continue to wean      ***Pulmonary***  [x]  Nitric oxide- 20 ppm   Post op vent management   Titration of FiO2 and PEEP, follow SpO2, CXR, blood gasses   100% FiO2 on ECMO    Mode: AC/ CMV (Assist Control/ Continuous Mandatory Ventilation)  RR (machine): 14  FiO2: 50  PEEP: 10  ITime: 1  MAP: 17  PC: 10  PIP: 23            ***GI***  [x] NPO  [x] Protonix    Bowel regimen with Miralax and senna       ***Renal***  Continue to monitor I/Os, BUN/Creatinine.   Replete lytes PRN  De Dios present  Continue diuresis with Bumex goal 2L negative by morning   C/w Free water 300 q4  Zaroxolyn added for hypernatremia        ***ID***  Meropenem for empiric coverage  Follow up cultures    ***Endocrine***  [x]  DM2: HbA1c 7.3%                - [x] Insulin gtt              - Need tight glycemic control to prevent wound infection.      Patient requires continuous monitoring with bedside rhythm monitoring, pulse oximetry monitoring, and continuous central venous and arterial pressure monitoring; and intermittent blood gas analysis. Care plan discussed with the ICU care team.   Patient remained critical, at risk for life threatening decompensation.    I have spent 30 minutes providing critical care management to this patient.    By signing my name below, I, Erin Palafox, attest that this documentation has been prepared under the direction and in the presence of Jeffrey Alaniz NP.  Electronically signed: Brian Hunt, 12-01-22 @ 22:25    I, Jeffrey Alaniz , personally performed the services described in this documentation. all medical record entries made by the karlaibkyle were at my direction and in my presence. I have reviewed the chart and agree that the record reflects my personal performance and is accurate and complete  Electronically signed: Jeffrey Alaniz NP. Patient seen and examined at the bedside.    Remained critically ill on continuous ICU monitoring.    OBJECTIVE:  Vital Signs Last 24 Hrs  T(C): 37.3 (01 Dec 2022 20:00), Max: 37.3 (01 Dec 2022 20:00)  T(F): 99.1 (01 Dec 2022 20:00), Max: 99.1 (01 Dec 2022 20:00)  HR: 74 (01 Dec 2022 22:00) (63 - 77)  RR: 16 (01 Dec 2022 22:00) (5 - 27)  SpO2: 100% (01 Dec 2022 22:00) (98% - 100%)    Parameters below as of 01 Dec 2022 20:00  Patient On (Oxygen Delivery Method): ventilator    O2 Concentration (%): 40    Physical Exam:   General: Intubated, multiple lines gtt  Neurology: sedated   Eyes: bilateral pupils equal and reactive   ENT/Neck: +ETT midline, Neck supple, trachea midline, No JVD   Respiratory: rhonchi bilaterally   CV: S1S2, no murmurs        [x] Sinus rhythm  Abdominal: Soft, NT, ND +BS   Extremities: 1-2+ pedal edema noted, + peripheral pulses   Skin: No Rashes, Hematoma, Ecchymosis                              Assessment:  61 y/o male with PMH of CABG, DM, HTN, HLD presenting as transfer from [place] for c/f STEMI. PTA arrival received 325 aspirin, 600 plavix, and no heparin drip started. Around 1:30 am patient had multiple episodes of non-bloody diarrhea and emesis with associated abdominal pain and chest pain, unable to localize, non-radiating, with associated SOB and no associated palpitations or diaphoresis. No recent fevers/chills, cough, rashes, or changes in urination. Does report mild diffuse back pain; started 5 days ago in setting on injury while walking and lifting objects. Denies focal weakness, numbness/tingling, or LOC due does report mild dizziness.    Cardiac arrest s/p [facility] ECMO 11/25  Hemodynamic instability  Hypovolemia  Post op respiratory insufficiency  Thrombocytopenia  Acute blood loss anemia  Leukocytosis       Plan:   ***Neuro***  CT head showed 4mm subdural hematoma 11/26: repeat CT head unchanged   [x] Sedated with [x] Precedex   Post operative neuro assessment   Dilaudid for pain    ***Cardiovascular***  11/30 TTE: EF 30-35%, Severe global LV dysfunction  Invasive hemodynamic monitoring, assess perfusion indices   SR / CVP 8/ MAP 79/ PAP 17/ CI 1.6/ Hct 24.7/ Lactate 0.7  [x] Cardene currently off   [x] ECMO- 3200 rpm, 3.37 flow  Reassessment of hemodynamics post resuscitation   [x] Statin  [x] AC Therapy with Argatroban gtt  Stress dose steroids discontinued       ***Pulmonary***  [x]  Nitric oxide- 20 ppm   Post op vent management   Titration of FiO2 and PEEP, follow SpO2, CXR, blood gasses   100% FiO2 on ECMO    Mode: AC/ CMV (Assist Control/ Continuous Mandatory Ventilation)  RR (machine): 14  FiO2: 50  PEEP: 10  ITime: 1  MAP: 17  PC: 10  PIP: 23            ***GI***  [x] NPO  [x] Protonix    Bowel regimen with Miralax and senna       ***Renal***  Continue to monitor I/Os, BUN/Creatinine.   Replete lytes PRN  De Dios present  Continue diuresis with Bumex goal 1.5L negative by morning   C/w Free water 300 q4  Zaroxolyn added for hypernatremia        ***ID***  Meropenem for empiric coverage  Follow up cultures    ***Endocrine***  [x]  DM2: HbA1c 7.3%                - [x] Insulin gtt              - Need tight glycemic control to prevent wound infection.      Patient requires continuous monitoring with bedside rhythm monitoring, pulse oximetry monitoring, and continuous central venous and arterial pressure monitoring; and intermittent blood gas analysis. Care plan discussed with the ICU care team.   Patient remained critical, at risk for life threatening decompensation.    I have spent 30 minutes providing critical care management to this patient.    By signing my name below, I, Erin Palafox, attest that this documentation has been prepared under the direction and in the presence of Jeffrey Alaniz NP.  Electronically signed: Brian Hunt, 12-01-22 @ 22:25    I, Jeffrey Alaniz , personally performed the services described in this documentation. all medical record entries made by the scribe were at my direction and in my presence. I have reviewed the chart and agree that the record reflects my personal performance and is accurate and complete  Electronically signed: Jeffrey Alaniz NP. Patient seen and examined at the bedside.    Remained critically ill on continuous ICU monitoring.    OBJECTIVE:  Vital Signs Last 24 Hrs  T(C): 37.3 (01 Dec 2022 20:00), Max: 37.3 (01 Dec 2022 20:00)  T(F): 99.1 (01 Dec 2022 20:00), Max: 99.1 (01 Dec 2022 20:00)  HR: 74 (01 Dec 2022 22:00) (63 - 77)  RR: 16 (01 Dec 2022 22:00) (5 - 27)  SpO2: 100% (01 Dec 2022 22:00) (98% - 100%)    Parameters below as of 01 Dec 2022 20:00  Patient On (Oxygen Delivery Method): ventilator    O2 Concentration (%): 40    Physical Exam:   General: Intubated, multiple lines gtt  Neurology: sedated   Eyes: bilateral pupils equal and reactive   ENT/Neck: +ETT midline, Neck supple, trachea midline, No JVD   Respiratory: rhonchi bilaterally   CV: S1S2, no murmurs        [x] Sinus rhythm  Abdominal: Soft, NT, ND +BS   Extremities: 1-2+ pedal edema noted, + peripheral pulses   Skin: No Rashes, Hematoma, Ecchymosis                              Assessment:  61 y/o male with PMH of CABG, DM, HTN, HLD presenting as transfer from [place] for c/f STEMI. PTA arrival received 325 aspirin, 600 plavix, and no heparin drip started. Around 1:30 am patient had multiple episodes of non-bloody diarrhea and emesis with associated abdominal pain and chest pain, unable to localize, non-radiating, with associated SOB and no associated palpitations or diaphoresis. No recent fevers/chills, cough, rashes, or changes in urination. Does report mild diffuse back pain; started 5 days ago in setting on injury while walking and lifting objects. Denies focal weakness, numbness/tingling, or LOC due does report mild dizziness.    Cardiac arrest s/p VA ECMO 11/25  Hemodynamic instability  Hypovolemia  Post op respiratory insufficiency  Thrombocytopenia  Acute blood loss anemia  Leukocytosis       Plan:   ***Neuro***  CT head showed 4mm subdural hematoma 11/26: repeat CT head unchanged   [x] Sedated with [x] Precedex   Post operative neuro assessment   Dilaudid for pain    ***Cardiovascular***  11/30 TTE: EF 30-35%, Severe global LV dysfunction  Invasive hemodynamic monitoring, assess perfusion indices   SR / CVP 8/ MAP 79/ PAP 17/ CI 1.6/ Hct 24.7/ Lactate 0.7  [x] Cardene currently off   [x] ECMO- 3200 rpm, 3.37 flow  Reassessment of hemodynamics post resuscitation   [x] Statin  [x] AC Therapy with Argatroban gtt  Stress dose steroids discontinued       ***Pulmonary***  [x]  Nitric oxide- 20 ppm   Post op vent management   Titration of FiO2 and PEEP, follow SpO2, CXR, blood gasses   100% FiO2 on ECMO    Mode: AC/ CMV (Assist Control/ Continuous Mandatory Ventilation)  RR (machine): 14  FiO2: 50  PEEP: 10  ITime: 1  MAP: 17  PC: 10  PIP: 23            ***GI***  [x] NPO  [x] Protonix    Bowel regimen with Miralax and senna       ***Renal***  Continue to monitor I/Os, BUN/Creatinine.   Replete lytes PRN  De Dios present  Continue diuresis with Bumex goal 1.5L negative by morning   C/w Free water 300 q4  Zaroxolyn added for hypernatremia        ***ID***  Meropenem for empiric coverage  Follow up cultures    ***Endocrine***  [x]  DM2: HbA1c 7.3%                - [x] Insulin gtt              - Need tight glycemic control to prevent wound infection.      Patient requires continuous monitoring with bedside rhythm monitoring, pulse oximetry monitoring, and continuous central venous and arterial pressure monitoring; and intermittent blood gas analysis. Care plan discussed with the ICU care team.   Patient remained critical, at risk for life threatening decompensation.    I have spent 30 minutes providing critical care management to this patient.    By signing my name below, I, Erin Palafox, attest that this documentation has been prepared under the direction and in the presence of Jeffrey Alaniz NP.  Electronically signed: Brian Hunt, 12-01-22 @ 22:25    I, Jeffrey Alaniz , personally performed the services described in this documentation. all medical record entries made by the scribe were at my direction and in my presence. I have reviewed the chart and agree that the record reflects my personal performance and is accurate and complete  Electronically signed: Jeffrey Alaniz NP. Patient seen and examined at the bedside.    Remained critically ill on continuous ICU monitoring.    OBJECTIVE:  Vital Signs Last 24 Hrs  T(C): 37.3 (01 Dec 2022 20:00), Max: 37.3 (01 Dec 2022 20:00)  T(F): 99.1 (01 Dec 2022 20:00), Max: 99.1 (01 Dec 2022 20:00)  HR: 74 (01 Dec 2022 22:00) (63 - 77)  RR: 16 (01 Dec 2022 22:00) (5 - 27)  SpO2: 100% (01 Dec 2022 22:00) (98% - 100%)    Parameters below as of 01 Dec 2022 20:00  Patient On (Oxygen Delivery Method): ventilator    O2 Concentration (%): 40    Physical Exam:   General: Intubated, multiple lines gtt  Neurology: sedated   Eyes: bilateral pupils equal and reactive   ENT/Neck: +ETT midline, Neck supple, trachea midline, No JVD   Respiratory: rhonchi bilaterally   CV: S1S2, no murmurs        [x] Sinus rhythm  Abdominal: Soft, NT, ND +BS   Extremities: 1-2+ pedal edema noted, + peripheral pulses   Skin: No Rashes, Hematoma, Ecchymosis                              Assessment:  61 y/o male with PMH of CABG, DM, HTN, HLD presenting as transfer from [place] for c/f STEMI. PTA arrival received 325 aspirin, 600 plavix, and no heparin drip started. Around 1:30 am patient had multiple episodes of non-bloody diarrhea and emesis with associated abdominal pain and chest pain, unable to localize, non-radiating, with associated SOB and no associated palpitations or diaphoresis. No recent fevers/chills, cough, rashes, or changes in urination. Does report mild diffuse back pain; started 5 days ago in setting on injury while walking and lifting objects. Denies focal weakness, numbness/tingling, or LOC due does report mild dizziness.    Cardiac arrest s/p VA ECMO 11/25  Hemodynamic instability  Hypovolemia  Post op respiratory insufficiency  Thrombocytopenia  Acute blood loss anemia  Leukocytosis       Plan:   ***Neuro***  CT head showed 4mm subdural hematoma 11/26: repeat CT head unchanged   [x] Sedated with [x] Precedex   Post operative neuro assessment   Dilaudid for pain    ***Cardiovascular***  11/30 TTE: EF 30-35%, Severe global LV dysfunction  Invasive hemodynamic monitoring, assess perfusion indices   SR / CVP 8/ MAP 79/ PAP 17/ CI 1.6/ Hct 24.7/ Lactate 0.7  [x] Cardene currently off   [x] ECMO- 3200 rpm, 3.37 flow  Reassessment of hemodynamics post resuscitation   [x] Statin  [x] AC Therapy with Argatroban gtt  Stress dose steroids discontinued       ***Pulmonary***  [x]  Nitric oxide- 20 ppm   Post op vent management   Titration of FiO2 and PEEP, follow SpO2, CXR, blood gasses   100% FiO2 on ECMO    Mode: AC/ CMV (Assist Control/ Continuous Mandatory Ventilation)  RR (machine): 14  FiO2: 50  PEEP: 10  ITime: 1  MAP: 17  PC: 10  PIP: 23            ***GI***  [x] NPO  [x] Protonix    Bowel regimen with Miralax and senna       ***Renal***  Continue to monitor I/Os, BUN/Creatinine.   Replete lytes PRN  De Dios present  Continue diuresis with Bumex goal 1.5L negative by morning   C/w Free water 300 q4  Zaroxolyn added for hypernatremia        ***ID***  Meropenem for empiric coverage  Follow up cultures    ***Endocrine***  [x]  DM2: HbA1c 7.3%                - [x] Insulin gtt              - Need tight glycemic control to prevent wound infection.      Patient requires continuous monitoring with bedside rhythm monitoring, pulse oximetry monitoring, and continuous central venous and arterial pressure monitoring; and intermittent blood gas analysis. Care plan discussed with the ICU care team.   Patient remained critical, at risk for life threatening decompensation.    I have spent 30 minutes providing critical care management to this patient.    By signing my name below, I, Erin Palafox, attest that this documentation has been prepared under the direction and in the presence of Jeffrey Alaniz NP.  Electronically signed: Brian Hunt, 12-01-22 @ 22:25    I, Jeffrey Alaniz , personally performed the services described in this documentation. all medical record entries made by the scribe were at my direction and in my presence. I have reviewed the chart and agree that the record reflects my personal performance and is accurate and complete  Electronically signed: Jeffrey Alaniz NP.

## 2022-12-01 NOTE — PROGRESS NOTE ADULT - ASSESSMENT
62 year old admitted with pancreatitis  not necrotizing on the CT  scan and cholecystitis .  pt has sent to the SICU but developed respiratory  failure presumed to be due to ARDS also with hs of CAD<CABG<DM and possible MI  Movedto the CTU for echo      Fever  not on CAVHD  cultures are negative and ct looks like ARDS.  no fever    legs are viable    ARDS  likely all problems related to pancreatitis .  agree with short 7 8 day course of meropenem despite the lack of necrosis on his ct scan  day 4/7       CHF  on ecmo  weaning per cardiac team

## 2022-12-01 NOTE — PROGRESS NOTE ADULT - SUBJECTIVE AND OBJECTIVE BOX
CRITICAL CARE ATTENDING - CTICU    MEDICATIONS  (STANDING):  argatroban Infusion 0.175 MICROgram(s)/kG/Min (0.98 mL/Hr) IV Continuous <Continuous>  artificial  tears Solution 1 Drop(s) Both EYES four times a day  atorvastatin 40 milliGRAM(s) Oral at bedtime  buMETAnide Injectable 2 milliGRAM(s) IV Push every 12 hours  chlorhexidine 0.12% Liquid 15 milliLiter(s) Oral Mucosa every 12 hours  chlorhexidine 2% Cloths 1 Application(s) Topical <User Schedule>  dexMEDEtomidine Infusion 1.5 MICROgram(s)/kG/Hr (34.8 mL/Hr) IV Continuous <Continuous>  dextrose 50% Injectable 50 milliLiter(s) IV Push every 15 minutes  insulin regular Infusion 6 Unit(s)/Hr (6 mL/Hr) IV Continuous <Continuous>  meropenem  IVPB 500 milliGRAM(s) IV Intermittent every 12 hours  metolazone 10 milliGRAM(s) Oral daily  niCARdipine Infusion 2 mG/Hr (10 mL/Hr) IV Continuous <Continuous>  pantoprazole  Injectable 40 milliGRAM(s) IV Push two times a day  polyethylene glycol 3350 17 Gram(s) Oral daily  potassium chloride  10 mEq/50 mL IVPB 10 milliEquivalent(s) IV Intermittent every 1 hour  senna 2 Tablet(s) Oral at bedtime  sodium chloride 0.9%. 1000 milliLiter(s) (5 mL/Hr) IV Continuous <Continuous>                                    8.1    11.23 )-----------( 90       ( 01 Dec 2022 01:08 )             24.7       12-    151<H>  |  119<H>  |  70<H>  ----------------------------<  139<H>  4.0   |  19<L>  |  3.01<H>    Ca    8.1<L>      01 Dec 2022 01:08  Phos  3.1     12-  Mg     2.4     12    TPro  5.9<L>  /  Alb  3.2<L>  /  TBili  0.8  /  DBili  x   /  AST  41<H>  /  ALT  10  /  AlkPhos  102  12      PT/INR - ( 01 Dec 2022 01:08 )   PT: 15.3 sec;   INR: 1.33 ratio         PTT - ( 01 Dec 2022 04:55 )  PTT:49.4 sec    Mode: AC/ CMV (Assist Control/ Continuous Mandatory Ventilation)  RR (machine): 14  FiO2: 50  PEEP: 10  ITime: 1  MAP: 14  PC: 10  PIP: 20      Daily     Daily Weight in k.2 (01 Dec 2022 00:00)      11-30 @ 07:01  -  12-01 @ 07:00  --------------------------------------------------------  IN: 4149.7 mL / OUT: 5675 mL / NET: -1525.3 mL        Critically Ill patient  : [ ] preoperative ,   [x] post operative    Requires :  [x] Arterial Line   [x] Central Line  [ ] PA catheter  [ ] IABP  [x] ECMO - [facility]  [ ] LVAD  [x] Ventilator  [ ] pacemaker [ ] Impella [x] Ophelia                      [x ] ABG's     [ x] Pulse Oxymetry Monitoring  Bedside evaluation , monitoring , treatment of hemodynamics , fluids , IVP/ IVCD meds.        Diagnosis:     POD 6- [facility] ECMO s/p arrest    Hypovolemia    Respiratory Failure     Ventilator Management:  [x]AC-rest    [ ]CPAP-PS Wean    [ ]Trach Collar     [ ]Extubate    [ ] T-Piece  [x]peep>5     +14    Respiratory status required full ventilatory support, close monitoring of respiratory rate and breathing pattern, the following of ABG’s with A-line monitoring, continuous pulse oximetry monitoring     Requires chest PT, pulmonary toilet, ambu bagging, suctioning to maintain SaO2,  patent airway and treat atelectasis.     IVCD Precedex for vent synchrony    Hemodynamic lability,  instability. Requires IVCD [ ] vasopressors- [] inotropes  [ ] vasodilator  [x]IVSS fluid  to maintain MAP, perfusion, C.I.      IVCD Cardene    CHF- acute [ x]   chronic [  ]    systolic [ x]   diatolic [ ]          - Echo- EF -   30-35%, Severe global LV dysfunction      [ ] RV dysfunction          - Cxr-cardiomegally, edema          - Clinical-  [ ]inotropes   [ ] pressors-  [ ]diuresis   [ ]IABP   [x]ECMO   [ ]LVAD   [x ]Respiratory Failure.     IVCD anticoagulation with [ ] Heparin  [ x] Argatroban for ECMO circuit     [facility] ECMO circuit    Renal Failure - Acute Kidney Injury      Fluid Overload     Hypernatremia    Ophelia 20 ppm    pancreatitis - gall stone     Cardiogenic Shock - EF ? 8   ?    Thrombocytopenia    DM- IVCD Insulin    Requires bedside physical therapy, mobilization and total assisted care.               I, Finn Zelaya, personally performed the services described in this documentation. All medical record entries made by the scribe were at my direction and in my presence. I have reviewed the chart and agree that the record reflects my personal performance and is accurate and complete.   Finn Zelaya MD.       By signing my name below, I, Abhijit Ngo, attest that this documentation has been prepared under the direction and in the presence of Finn Zelyaa MD.   Electronically Signed: Brian Slaughter 22 @ 07:44        Discussed with CT surgeon, Physician Assistant - Nurse Practitioner- Critical care medicine team.   Dicussed at  AM / PM rounds.   Chart, labs , films reviewed.    Cumulative Critical Care Time Given Today:  CRITICAL CARE ATTENDING - [facility]    MEDICATIONS  (STANDING):  argatroban Infusion 0.175 MICROgram(s)/kG/Min (0.98 mL/Hr) IV Continuous <Continuous>  artificial  tears Solution 1 Drop(s) Both EYES four times a day  atorvastatin 40 milliGRAM(s) Oral at bedtime  buMETAnide Injectable 2 milliGRAM(s) IV Push every 12 hours  chlorhexidine 0.12% Liquid 15 milliLiter(s) Oral Mucosa every 12 hours  chlorhexidine 2% Cloths 1 Application(s) Topical <User Schedule>  dexMEDEtomidine Infusion 1.5 MICROgram(s)/kG/Hr (34.8 mL/Hr) IV Continuous <Continuous>  dextrose 50% Injectable 50 milliLiter(s) IV Push every 15 minutes  insulin regular Infusion 6 Unit(s)/Hr (6 mL/Hr) IV Continuous <Continuous>  meropenem  IVPB 500 milliGRAM(s) IV Intermittent every 12 hours  metolazone 10 milliGRAM(s) Oral daily  niCARdipine Infusion 2 mG/Hr (10 mL/Hr) IV Continuous <Continuous>  pantoprazole  Injectable 40 milliGRAM(s) IV Push two times a day  polyethylene glycol 3350 17 Gram(s) Oral daily  potassium chloride  10 mEq/50 mL IVPB 10 milliEquivalent(s) IV Intermittent every 1 hour  senna 2 Tablet(s) Oral at bedtime  sodium chloride 0.9%. 1000 milliLiter(s) (5 mL/Hr) IV Continuous <Continuous>                                    8.1    11.23 )-----------( 90       ( 01 Dec 2022 01:08 )             24.7       12-    151<H>  |  119<H>  |  70<H>  ----------------------------<  139<H>  4.0   |  19<L>  |  3.01<H>    Ca    8.1<L>      01 Dec 2022 01:08  Phos  3.1     12-  Mg     2.4     12    TPro  5.9<L>  /  Alb  3.2<L>  /  TBili  0.8  /  DBili  x   /  AST  41<H>  /  ALT  10  /  AlkPhos  102  12      PT/INR - ( 01 Dec 2022 01:08 )   PT: 15.3 sec;   INR: 1.33 ratio         PTT - ( 01 Dec 2022 04:55 )  PTT:49.4 sec    Mode: AC/ CMV (Assist Control/ Continuous Mandatory Ventilation)  RR (machine): 14  FiO2: 50  PEEP: 10  ITime: 1  MAP: 14  PC: 10  PIP: 20      Daily     Daily Weight in k.2 (01 Dec 2022 00:00)      11-30 @ 07:01  -  12-01 @ 07:00  --------------------------------------------------------  IN: 4149.7 mL / OUT: 5675 mL / NET: -1525.3 mL        Critically Ill patient  : [ ] preoperative ,   [x] post operative    Requires :  [x] Arterial Line   [x] Central Line  [ ] PA catheter  [ ] IABP  [x] ECMO - VA  [ ] LVAD  [x] Ventilator  [ ] pacemaker [ ] Impella [x] Ophelia                      [x ] ABG's     [ x] Pulse Oxymetry Monitoring  Bedside evaluation , monitoring , treatment of hemodynamics , fluids , IVP/ IVCD meds.        Diagnosis:     POD 6- [facility] ECMO s/p arrest    Hypovolemia    Respiratory Failure     Ventilator Management:  [x]AC-rest    [ ]CPAP-PS Wean    [ ]Trach Collar     [ ]Extubate    [ ] T-Piece  [x]peep>5     +14    Respiratory status required full ventilatory support, close monitoring of respiratory rate and breathing pattern, the following of ABG’s with A-line monitoring, continuous pulse oximetry monitoring     Requires chest PT, pulmonary toilet, ambu bagging, suctioning to maintain SaO2,  patent airway and treat atelectasis.     IVCD Precedex for vent synchrony    Hemodynamic lability,  instability. Requires IVCD [ ] vasopressors- [] inotropes  [ ] vasodilator  [x]IVSS fluid  to maintain MAP, perfusion, C.I.      IVCD Cardene    CHF- acute [ x]   chronic [  ]    systolic [ x]   diatolic [ ]          - Echo- EF -   30-35%, Severe global LV dysfunction      [ ] RV dysfunction          - Cxr-cardiomegally, edema          - Clinical-  [ ]inotropes   [ ] pressors-  [ ]diuresis   [ ]IABP   [x]ECMO   [ ]LVAD   [x ]Respiratory Failure.     IVCD anticoagulation with [ ] Heparin  [ x] Argatroban for ECMO circuit     [facility] ECMO circuit    Renal Failure - Acute Kidney Injury      Fluid Overload     Hypernatremia    Ophelia 20 ppm    pancreatitis - gall stone     Cardiogenic Shock - EF ? 8   ?    Thrombocytopenia    DM- IVCD Insulin    Requires bedside physical therapy, mobilization and total alf care.               I, Finn Zelaya, personally performed the services described in this documentation. All medical record entries made by the scribe were at my direction and in my presence. I have reviewed the chart and agree that the record reflects my personal performance and is accurate and complete.   Finn Zelaya MD.       By signing my name below, I, Abhijit Ngo, attest that this documentation has been prepared under the direction and in the presence of Finn Zelaya MD.   Electronically Signed: Brian Slaughter 22 @ 07:44        Discussed with CT surgeon, Physician Assistant - Nurse Practitioner- Critical care medicine team.   Dicussed at  AM / PM rounds.   Chart, labs , films reviewed.    Cumulative Critical Care Time Given Today:  CRITICAL CARE ATTENDING - [facility]    MEDICATIONS  (STANDING):  argatroban Infusion 0.175 MICROgram(s)/kG/Min (0.98 mL/Hr) IV Continuous <Continuous>  artificial  tears Solution 1 Drop(s) Both EYES four times a day  atorvastatin 40 milliGRAM(s) Oral at bedtime  buMETAnide Injectable 2 milliGRAM(s) IV Push every 12 hours  chlorhexidine 0.12% Liquid 15 milliLiter(s) Oral Mucosa every 12 hours  chlorhexidine 2% Cloths 1 Application(s) Topical <User Schedule>  dexMEDEtomidine Infusion 1.5 MICROgram(s)/kG/Hr (34.8 mL/Hr) IV Continuous <Continuous>  dextrose 50% Injectable 50 milliLiter(s) IV Push every 15 minutes  insulin regular Infusion 6 Unit(s)/Hr (6 mL/Hr) IV Continuous <Continuous>  meropenem  IVPB 500 milliGRAM(s) IV Intermittent every 12 hours  metolazone 10 milliGRAM(s) Oral daily  niCARdipine Infusion 2 mG/Hr (10 mL/Hr) IV Continuous <Continuous>  pantoprazole  Injectable 40 milliGRAM(s) IV Push two times a day  polyethylene glycol 3350 17 Gram(s) Oral daily  potassium chloride  10 mEq/50 mL IVPB 10 milliEquivalent(s) IV Intermittent every 1 hour  senna 2 Tablet(s) Oral at bedtime  sodium chloride 0.9%. 1000 milliLiter(s) (5 mL/Hr) IV Continuous <Continuous>                                    8.1    11.23 )-----------( 90       ( 01 Dec 2022 01:08 )             24.7       12-    151<H>  |  119<H>  |  70<H>  ----------------------------<  139<H>  4.0   |  19<L>  |  3.01<H>    Ca    8.1<L>      01 Dec 2022 01:08  Phos  3.1     12-  Mg     2.4     12    TPro  5.9<L>  /  Alb  3.2<L>  /  TBili  0.8  /  DBili  x   /  AST  41<H>  /  ALT  10  /  AlkPhos  102  12      PT/INR - ( 01 Dec 2022 01:08 )   PT: 15.3 sec;   INR: 1.33 ratio         PTT - ( 01 Dec 2022 04:55 )  PTT:49.4 sec    Mode: AC/ CMV (Assist Control/ Continuous Mandatory Ventilation)  RR (machine): 14  FiO2: 50  PEEP: 10  ITime: 1  MAP: 14  PC: 10  PIP: 20      Daily     Daily Weight in k.2 (01 Dec 2022 00:00)      11-30 @ 07:01  -  12-01 @ 07:00  --------------------------------------------------------  IN: 4149.7 mL / OUT: 5675 mL / NET: -1525.3 mL        Critically Ill patient  : [ ] preoperative ,   [x] post operative    Requires :  [x] Arterial Line   [x] Central Line  [ ] PA catheter  [ ] IABP  [x] ECMO - VA  [ ] LVAD  [x] Ventilator  [ ] pacemaker [ ] Impella [x] Ophelia                      [x ] ABG's     [ x] Pulse Oxymetry Monitoring  Bedside evaluation , monitoring , treatment of hemodynamics , fluids , IVP/ IVCD meds.        Diagnosis:     POD 6- [facility] ECMO s/p arrest    Hypovolemia    Respiratory Failure     Ventilator Management:  [x]AC-rest    [ ]CPAP-PS Wean    [ ]Trach Collar     [ ]Extubate    [ ] T-Piece  [x]peep>5     +14    Respiratory status required full ventilatory support, close monitoring of respiratory rate and breathing pattern, the following of ABG’s with A-line monitoring, continuous pulse oximetry monitoring     Requires chest PT, pulmonary toilet, ambu bagging, suctioning to maintain SaO2,  patent airway and treat atelectasis.     IVCD Precedex for vent synchrony    Hemodynamic lability,  instability. Requires IVCD [ ] vasopressors- [] inotropes  [ ] vasodilator  [x]IVSS fluid  to maintain MAP, perfusion, C.I.      IVCD Cardene    CHF- acute [ x]   chronic [  ]    systolic [ x]   diatolic [ ]          - Echo- EF -   30-35%, Severe global LV dysfunction      [ ] RV dysfunction          - Cxr-cardiomegally, edema          - Clinical-  [ ]inotropes   [ ] pressors-  [ ]diuresis   [ ]IABP   [x]ECMO   [ ]LVAD   [x ]Respiratory Failure.     IVCD anticoagulation with [ ] Heparin  [ x] Argatroban for ECMO circuit     [facility] ECMO circuit    Renal Failure - Acute Kidney Injury      Fluid Overload     Hypernatremia    Ophelia 20 ppm    pancreatitis - gall stone     Cardiogenic Shock - EF ? 8   ?    Thrombocytopenia    DM- IVCD Insulin    Requires bedside physical therapy, mobilization and total [facility] care.               I, Finn Zelaya, personally performed the services described in this documentation. All medical record entries made by the scribe were at my direction and in my presence. I have reviewed the chart and agree that the record reflects my personal performance and is accurate and complete.   Finn Zelaya MD.       By signing my name below, I, Abhijit Ngo, attest that this documentation has been prepared under the direction and in the presence of Finn Zelaya MD.   Electronically Signed: Brian Slaughter 22 @ 07:44        Discussed with CT surgeon, Physician Assistant - Nurse Practitioner- Critical care medicine team.   Dicussed at  AM / PM rounds.   Chart, labs , films reviewed.    Cumulative Critical Care Time Given Today:  CRITICAL CARE ATTENDING - [facility]    MEDICATIONS  (STANDING):  argatroban Infusion 0.175 MICROgram(s)/kG/Min (0.98 mL/Hr) IV Continuous <Continuous>  artificial  tears Solution 1 Drop(s) Both EYES four times a day  atorvastatin 40 milliGRAM(s) Oral at bedtime  buMETAnide Injectable 2 milliGRAM(s) IV Push every 12 hours  chlorhexidine 0.12% Liquid 15 milliLiter(s) Oral Mucosa every 12 hours  chlorhexidine 2% Cloths 1 Application(s) Topical <User Schedule>  dexMEDEtomidine Infusion 1.5 MICROgram(s)/kG/Hr (34.8 mL/Hr) IV Continuous <Continuous>  dextrose 50% Injectable 50 milliLiter(s) IV Push every 15 minutes  insulin regular Infusion 6 Unit(s)/Hr (6 mL/Hr) IV Continuous <Continuous>  meropenem  IVPB 500 milliGRAM(s) IV Intermittent every 12 hours  metolazone 10 milliGRAM(s) Oral daily  niCARdipine Infusion 2 mG/Hr (10 mL/Hr) IV Continuous <Continuous>  pantoprazole  Injectable 40 milliGRAM(s) IV Push two times a day  polyethylene glycol 3350 17 Gram(s) Oral daily  potassium chloride  10 mEq/50 mL IVPB 10 milliEquivalent(s) IV Intermittent every 1 hour  senna 2 Tablet(s) Oral at bedtime  sodium chloride 0.9%. 1000 milliLiter(s) (5 mL/Hr) IV Continuous <Continuous>                                    8.1    11.23 )-----------( 90       ( 01 Dec 2022 01:08 )             24.7       12-    151<H>  |  119<H>  |  70<H>  ----------------------------<  139<H>  4.0   |  19<L>  |  3.01<H>    Ca    8.1<L>      01 Dec 2022 01:08  Phos  3.1     12-  Mg     2.4     12    TPro  5.9<L>  /  Alb  3.2<L>  /  TBili  0.8  /  DBili  x   /  AST  41<H>  /  ALT  10  /  AlkPhos  102  12      PT/INR - ( 01 Dec 2022 01:08 )   PT: 15.3 sec;   INR: 1.33 ratio         PTT - ( 01 Dec 2022 04:55 )  PTT:49.4 sec    Mode: AC/ CMV (Assist Control/ Continuous Mandatory Ventilation)  RR (machine): 14  FiO2: 50  PEEP: 10  ITime: 1  MAP: 14  PC: 10  PIP: 20      Daily     Daily Weight in k.2 (01 Dec 2022 00:00)      11-30 @ 07:01  -  12-01 @ 07:00  --------------------------------------------------------  IN: 4149.7 mL / OUT: 5675 mL / NET: -1525.3 mL        Critically Ill patient  : [ ] preoperative ,   [x] post operative    Requires :  [x] Arterial Line   [x] Central Line  [ ] PA catheter  [ ] IABP  [x] ECMO - VA  [ ] LVAD  [x] Ventilator  [ ] pacemaker [ ] Impella [x] Ophelia                      [x ] ABG's     [ x] Pulse Oxymetry Monitoring  Bedside evaluation , monitoring , treatment of hemodynamics , fluids , IVP/ IVCD meds.        Diagnosis:     POD 6- [facility] ECMO s/p arrest    Hypovolemia    Respiratory Failure     ARDS    Ventilator Management:  [x]AC-rest    [ ]CPAP-PS Wean    [ ]Trach Collar     [ ]Extubate    [ ] T-Piece  [x]peep>5     +14    Respiratory status required full ventilatory support, close monitoring of respiratory rate and breathing pattern, the following of ABG’s with A-line monitoring, continuous pulse oximetry monitoring     Requires chest PT, pulmonary toilet, ambu bagging, suctioning to maintain SaO2,  patent airway and treat atelectasis.     IVCD Precedex for vent synchrony    Hemodynamic lability,  instability. Requires IVCD [ ] vasopressors- [] inotropes  [ ] vasodilator  [x]IVSS fluid  to maintain MAP, perfusion, C.I.      Hypertension    IVCD Cardene    CHF- acute [ x]   chronic [  ]    systolic [ x]   diatolic [ ]          - Echo- EF -   30-35%, Severe global LV dysfunction      [ ] RV dysfunction          - Cxr-cardiomegally, edema          - Clinical-  [ ]inotropes   [ ] pressors-  [ ]diuresis   [ ]IABP   [x]ECMO   [ ]LVAD   [x ]Respiratory Failure.     IVCD anticoagulation with [ ] Heparin  [ x] Argatroban for ECMO circuit     [facility] ECMO circuit    Renal Failure - Acute Kidney Injury      Fluid Overload     Hypernatremia    Ophelia 20 ppm    pancreatitis     Cardiogenic Shock - EF ? 8   ?    Thrombocytopenia    DM- IVCD Insulin    Requires bedside physical therapy, mobilization and total [facility] care.     ? ERCP ? Cardiac  Cath ?              I, Finn Zelaya, personally performed the services described in this documentation. All medical record entries made by the scribe were at my direction and in my presence. I have reviewed the chart and agree that the record reflects my personal performance and is accurate and complete.   Finn Zelaya MD.       By signing my name below, I, Abhijit Ngo, attest that this documentation has been prepared under the direction and in the presence of Finn Zelaya MD.   Electronically Signed: Brian Slaughter 22 @ 07:44        Discussed with CT surgeon, Physician Assistant - Nurse Practitioner- Critical care medicine team.   Dicussed at  AM / PM rounds.   Chart, labs , films reviewed.    Cumulative Critical Care Time Given Today:  90 min  CRITICAL CARE ATTENDING - [facility]    MEDICATIONS  (STANDING):  argatroban Infusion 0.175 MICROgram(s)/kG/Min (0.98 mL/Hr) IV Continuous <Continuous>  artificial  tears Solution 1 Drop(s) Both EYES four times a day  atorvastatin 40 milliGRAM(s) Oral at bedtime  buMETAnide Injectable 2 milliGRAM(s) IV Push every 12 hours  chlorhexidine 0.12% Liquid 15 milliLiter(s) Oral Mucosa every 12 hours  chlorhexidine 2% Cloths 1 Application(s) Topical <User Schedule>  dexMEDEtomidine Infusion 1.5 MICROgram(s)/kG/Hr (34.8 mL/Hr) IV Continuous <Continuous>  dextrose 50% Injectable 50 milliLiter(s) IV Push every 15 minutes  insulin regular Infusion 6 Unit(s)/Hr (6 mL/Hr) IV Continuous <Continuous>  meropenem  IVPB 500 milliGRAM(s) IV Intermittent every 12 hours  metolazone 10 milliGRAM(s) Oral daily  niCARdipine Infusion 2 mG/Hr (10 mL/Hr) IV Continuous <Continuous>  pantoprazole  Injectable 40 milliGRAM(s) IV Push two times a day  polyethylene glycol 3350 17 Gram(s) Oral daily  potassium chloride  10 mEq/50 mL IVPB 10 milliEquivalent(s) IV Intermittent every 1 hour  senna 2 Tablet(s) Oral at bedtime  sodium chloride 0.9%. 1000 milliLiter(s) (5 mL/Hr) IV Continuous <Continuous>                                    8.1    11.23 )-----------( 90       ( 01 Dec 2022 01:08 )             24.7       12-    151<H>  |  119<H>  |  70<H>  ----------------------------<  139<H>  4.0   |  19<L>  |  3.01<H>    Ca    8.1<L>      01 Dec 2022 01:08  Phos  3.1     12-  Mg     2.4     12    TPro  5.9<L>  /  Alb  3.2<L>  /  TBili  0.8  /  DBili  x   /  AST  41<H>  /  ALT  10  /  AlkPhos  102  12      PT/INR - ( 01 Dec 2022 01:08 )   PT: 15.3 sec;   INR: 1.33 ratio         PTT - ( 01 Dec 2022 04:55 )  PTT:49.4 sec    Mode: AC/ CMV (Assist Control/ Continuous Mandatory Ventilation)  RR (machine): 14  FiO2: 50  PEEP: 10  ITime: 1  MAP: 14  PC: 10  PIP: 20      Daily     Daily Weight in k.2 (01 Dec 2022 00:00)      11-30 @ 07:01  -  12-01 @ 07:00  --------------------------------------------------------  IN: 4149.7 mL / OUT: 5675 mL / NET: -1525.3 mL        Critically Ill patient  : [ ] preoperative ,   [x] post operative    Requires :  [x] Arterial Line   [x] Central Line  [ ] PA catheter  [ ] IABP  [x] ECMO - VA  [ ] LVAD  [x] Ventilator  [ ] pacemaker [ ] Impella [x] Ophelia                      [x ] ABG's     [ x] Pulse Oxymetry Monitoring  Bedside evaluation , monitoring , treatment of hemodynamics , fluids , IVP/ IVCD meds.        Diagnosis:     POD 6- VA ECMO s/p arrest    Hypovolemia    Respiratory Failure     ARDS    Ventilator Management:  [x]AC-rest    [ ]CPAP-PS Wean    [ ]Trach Collar     [ ]Extubate    [ ] T-Piece  [x]peep>5     +14    Respiratory status required full ventilatory support, close monitoring of respiratory rate and breathing pattern, the following of ABG’s with A-line monitoring, continuous pulse oximetry monitoring     Requires chest PT, pulmonary toilet, ambu bagging, suctioning to maintain SaO2,  patent airway and treat atelectasis.     IVCD Precedex for vent synchrony    Hemodynamic lability,  instability. Requires IVCD [ ] vasopressors- [] inotropes  [ ] vasodilator  [x]IVSS fluid  to maintain MAP, perfusion, C.I.      Hypertension    IVCD Cardene    CHF- acute [ x]   chronic [  ]    systolic [ x]   diatolic [ ]          - Echo- EF -   30-35%, Severe global LV dysfunction      [ ] RV dysfunction          - Cxr-cardiomegally, edema          - Clinical-  [ ]inotropes   [ ] pressors-  [ ]diuresis   [ ]IABP   [x]ECMO   [ ]LVAD   [x ]Respiratory Failure.     IVCD anticoagulation with [ ] Heparin  [ x] Argatroban for ECMO circuit     [facility] ECMO circuit    Renal Failure - Acute Kidney Injury      Fluid Overload     Hypernatremia    Ophelia 20 ppm    pancreatitis     Cardiogenic Shock - EF ? 8   ?    Thrombocytopenia    DM- IVCD Insulin    Requires bedside physical therapy, mobilization and total senior care care.     ? ERCP ? Cardiac  Cath ?              I, Finn Zelaya, personally performed the services described in this documentation. All medical record entries made by the scribe were at my direction and in my presence. I have reviewed the chart and agree that the record reflects my personal performance and is accurate and complete.   Finn Zelaya MD.       By signing my name below, I, Abhijit Ngo, attest that this documentation has been prepared under the direction and in the presence of Finn Zelaya MD.   Electronically Signed: Brian Slaughter 22 @ 07:44        Discussed with CT surgeon, Physician Assistant - Nurse Practitioner- Critical care medicine team.   Dicussed at  AM / PM rounds.   Chart, labs , films reviewed.    Cumulative Critical Care Time Given Today:  90 min  CRITICAL CARE ATTENDING - [facility]ICU    MEDICATIONS  (STANDING):  argatroban Infusion 0.175 MICROgram(s)/kG/Min (0.98 mL/Hr) IV Continuous <Continuous>  artificial  tears Solution 1 Drop(s) Both EYES four times a day  atorvastatin 40 milliGRAM(s) Oral at bedtime  buMETAnide Injectable 2 milliGRAM(s) IV Push every 12 hours  chlorhexidine 0.12% Liquid 15 milliLiter(s) Oral Mucosa every 12 hours  chlorhexidine 2% Cloths 1 Application(s) Topical <User Schedule>  dexMEDEtomidine Infusion 1.5 MICROgram(s)/kG/Hr (34.8 mL/Hr) IV Continuous <Continuous>  dextrose 50% Injectable 50 milliLiter(s) IV Push every 15 minutes  insulin regular Infusion 6 Unit(s)/Hr (6 mL/Hr) IV Continuous <Continuous>  meropenem  IVPB 500 milliGRAM(s) IV Intermittent every 12 hours  metolazone 10 milliGRAM(s) Oral daily  niCARdipine Infusion 2 mG/Hr (10 mL/Hr) IV Continuous <Continuous>  pantoprazole  Injectable 40 milliGRAM(s) IV Push two times a day  polyethylene glycol 3350 17 Gram(s) Oral daily  potassium chloride  10 mEq/50 mL IVPB 10 milliEquivalent(s) IV Intermittent every 1 hour  senna 2 Tablet(s) Oral at bedtime  sodium chloride 0.9%. 1000 milliLiter(s) (5 mL/Hr) IV Continuous <Continuous>                                    8.1    11.23 )-----------( 90       ( 01 Dec 2022 01:08 )             24.7       12-    151<H>  |  119<H>  |  70<H>  ----------------------------<  139<H>  4.0   |  19<L>  |  3.01<H>    Ca    8.1<L>      01 Dec 2022 01:08  Phos  3.1     12-  Mg     2.4     12    TPro  5.9<L>  /  Alb  3.2<L>  /  TBili  0.8  /  DBili  x   /  AST  41<H>  /  ALT  10  /  AlkPhos  102  12      PT/INR - ( 01 Dec 2022 01:08 )   PT: 15.3 sec;   INR: 1.33 ratio         PTT - ( 01 Dec 2022 04:55 )  PTT:49.4 sec    Mode: AC/ CMV (Assist Control/ Continuous Mandatory Ventilation)  RR (machine): 14  FiO2: 50  PEEP: 10  ITime: 1  MAP: 14  PC: 10  PIP: 20      Daily     Daily Weight in k.2 (01 Dec 2022 00:00)      11-30 @ 07:01  -  12-01 @ 07:00  --------------------------------------------------------  IN: 4149.7 mL / OUT: 5675 mL / NET: -1525.3 mL        Critically Ill patient  : [ ] preoperative ,   [x] post operative    Requires :  [x] Arterial Line   [x] Central Line  [ ] PA catheter  [ ] IABP  [x] ECMO - VA  [ ] LVAD  [x] Ventilator  [ ] pacemaker [ ] Impella [x] Ophelia                      [x ] ABG's     [ x] Pulse Oxymetry Monitoring  Bedside evaluation , monitoring , treatment of hemodynamics , fluids , IVP/ IVCD meds.        Diagnosis:     POD 6- VA ECMO s/p arrest    Hypovolemia    Respiratory Failure     ARDS    Ventilator Management:  [x]AC-rest    [ ]CPAP-PS Wean    [ ]Trach Collar     [ ]Extubate    [ ] T-Piece  [x]peep>5     +14    Respiratory status required full ventilatory support, close monitoring of respiratory rate and breathing pattern, the following of ABG’s with A-line monitoring, continuous pulse oximetry monitoring     Requires chest PT, pulmonary toilet, ambu bagging, suctioning to maintain SaO2,  patent airway and treat atelectasis.     IVCD Precedex for vent synchrony    Hemodynamic lability,  instability. Requires IVCD [ ] vasopressors- [] inotropes  [ ] vasodilator  [x]IVSS fluid  to maintain MAP, perfusion, C.I.      Hypertension    IVCD Cardene    CHF- acute [ x]   chronic [  ]    systolic [ x]   diatolic [ ]          - Echo- EF -   30-35%, Severe global LV dysfunction      [ ] RV dysfunction          - Cxr-cardiomegally, edema          - Clinical-  [ ]inotropes   [ ] pressors-  [ ]diuresis   [ ]IABP   [x]ECMO   [ ]LVAD   [x ]Respiratory Failure.     IVCD anticoagulation with [ ] Heparin  [ x] Argatroban for ECMO circuit     VA ECMO circuit    Renal Failure - Acute Kidney Injury      Fluid Overload     Hypernatremia    Ophelia 20 ppm    pancreatitis     Cardiogenic Shock - EF ? 8   ?    Thrombocytopenia    DM- IVCD Insulin    Requires bedside physical therapy, mobilization and total residential care.     ? ERCP ? Cardiac  Cath ?              I, Finn Zelaya, personally performed the services described in this documentation. All medical record entries made by the scribe were at my direction and in my presence. I have reviewed the chart and agree that the record reflects my personal performance and is accurate and complete.   Finn Zelaya MD.       By signing my name below, I, Abhijit Ngo, attest that this documentation has been prepared under the direction and in the presence of Finn Zelaya MD.   Electronically Signed: Brian Slaughter 22 @ 07:44        Discussed with CT surgeon, Physician Assistant - Nurse Practitioner- Critical care medicine team.   Dicussed at  AM / PM rounds.   Chart, labs , films reviewed.    Cumulative Critical Care Time Given Today:  90 min

## 2022-12-01 NOTE — PROGRESS NOTE ADULT - PROBLEM SELECTOR PLAN 4
- CT abd showing acute pancreatitis  - RUQ for today  - lipase > 3000 11/24, normalized to 40 and now uptrended with resuming NGT feeding, back to NPO  - conservative care  - appreciate GI recs, possible ERCP once stable  - surgery following   - on emperic meropenem  - had a low grade fever despite Abx, repeating cultures today, negative from 11/28

## 2022-12-01 NOTE — PROGRESS NOTE ADULT - PROBLEM SELECTOR PLAN 2
- troponin peaked at > 98597   - plan for LHC with IABP once recovers from ERIC  - continue statin  - would start ASA when okay with surgical team - troponin peaked at > 16374   - plan for LHC with IABP once recovers from ERIC  - continue statin  - would start ASA when okay with surgical team - troponin peaked at > 68691   - plan for LHC with IABP once recovers from ERIC  - continue statin  - would start ASA when okay with surgical team

## 2022-12-01 NOTE — PROGRESS NOTE ADULT - PROBLEM SELECTOR PLAN 3
- CXR worse today  - continue diuresis to maintain net negative, responded well to bumex 2mg q12h , would continue   - c/w ECMO , adjust sweep and FdO2 according to ABG  - extubate when able  - continue checking right radial ABG. - CXR worse today  - continue diuresis to maintain net negative, responded well to bumex 2mg q12h , would continue   - c/w ECMO , adjust sweep and FdO2 according to ABG  - extubate when able  - continue checking right radial ABG.  - continue treatment for pancreatitis related ARDS

## 2022-12-01 NOTE — CHART NOTE - NSCHARTNOTEFT_GEN_A_CORE
Brief Update Note    Contacted by primary regarding concerns for worsening lipase and abdominal pain while on TFs    Remains afebrile, on ECMO.  Liver chemistries remain wnl and no biliary dilatation on imaging.  Prior recommendations remain; not a candidate for endoscopic intervention and no clear indication at this time.    - Obtain repeat abdominal imaging if able  - Hold TFs  - Daily CBC, CMP, coags  - Ensure adequate hydration  - Do not need to trend lipase  - Conveyed to primary  - Rest of care per CTU  - Please see prior note for full recs    Discussed with [facility] Attending.    Thank you for involving us in this patient's care.    Dinorah Do MD  Gastroenterology/Hepatology Fellow, PGY-VI    NON-URGENT CONSULTS:  Please email [email or web address]  AT NIGHT AND ON WEEKENDS:  Contact on-call GI fellow via answering service ([phone]) from 5pm-8am and on weekends/holidays  MONDAY-FRIDAY 8AM-5PM:  Pager# [phone] ([facility])  GI Phone# [phone] ([facility]) Brief Update Note    Contacted by primary regarding concerns for worsening lipase and abdominal pain while on TFs    Remains afebrile, on ECMO.  Liver chemistries remain wnl and no biliary dilatation on imaging.  Prior recommendations remain; not a candidate for endoscopic intervention and no clear indication at this time.    - Obtain repeat abdominal imaging if able  - Hold TFs  - Daily CBC, CMP, coags  - Ensure adequate hydration  - Do not need to trend lipase  - Conveyed to primary  - Rest of care per CTU  - Please see prior note for full recs    Discussed with [facility] Attending.    Thank you for involving us in this patient's care.    Dinorah Do MD  Gastroenterology/Hepatology Fellow, PGY-VI    NON-URGENT CONSULTS:  Please email [email or web address]  [email or web address]  AT NIGHT AND ON WEEKENDS:  Contact on-call GI fellow via answering service ([phone]) from 5pm-8am and on weekends/holidays  MONDAY-FRIDAY 8AM-5PM:  Pager# [phone] ([facility])  GI Phone# [phone] ([facility]) Brief Update Note    Contacted by primary regarding concerns for worsening lipase and abdominal pain while on TFs    Remains afebrile, on ECMO.  Liver chemistries remain wnl and no biliary dilatation on imaging.  Prior recommendations remain; not a candidate for endoscopic intervention and no clear indication at this time.    - Obtain repeat abdominal imaging if able  - Hold TFs  - Daily CBC, CMP, coags  - Ensure adequate hydration  - Do not need to trend lipase  - Conveyed to primary  - Rest of care per CTU  - Please see prior note for full recs    Discussed with [facility] Attending.    Thank you for involving us in this patient's care.    Dinorah Do MD  Gastroenterology/Hepatology Fellow, PGY-VI    NON-URGENT CONSULTS:  Please email [email or web address]  AT NIGHT AND ON WEEKENDS:  Contact on-call GI fellow via answering service ([phone]) from 5pm-8am and on weekends/holidays  MONDAY-FRIDAY 8AM-5PM:  Pager# [phone] ([facility])  GI Phone# [phone] ([facility]) Brief Update Note    Contacted by primary regarding concerns for worsening lipase and abdominal pain while on TFs    Remains afebrile, on ECMO.  Liver chemistries remain wnl and no biliary dilatation on imaging.  Prior recommendations remain; not a candidate for endoscopic intervention and no clear indication at this time.    - Obtain repeat abdominal imaging if able, [facility] will follow up  - Hold TFs  - Daily CBC, CMP, coags  - Ensure adequate hydration  - Do not need to trend lipase  - Conveyed to primary  - Rest of care per CTU  - Please see prior note for full recs    Discussed with [facility] Attending, Dr. Hamilton.    Thank you for involving us in this patient's care.    Dinorah Do MD  Gastroenterology/Hepatology Fellow, PGY-VI    NON-URGENT CONSULTS:  Please email [email or web address]  AT NIGHT AND ON WEEKENDS:  Contact on-call GI fellow via answering service ([phone]) from 5pm-8am and on weekends/holidays  MONDAY-FRIDAY 8AM-5PM:  Pager# [phone] ([facility])  GI Phone# [phone] ([facility]) Brief Update Note    Contacted by primary regarding concerns for worsening lipase and abdominal pain while on TFs    Remains afebrile, on ECMO.  Liver chemistries remain wnl and no biliary dilatation on imaging.  Prior recommendations remain; not a candidate for endoscopic intervention and no clear indication at this time.    - Obtain repeat abdominal imaging if able, [facility] will follow up  - Hold TFs  - Daily CBC, CMP, coags  - Ensure adequate hydration  - Do not need to trend lipase  - Conveyed to primary  - Rest of care per CTU  - Please see prior note for full recs    Discussed with [facility] Attending, Dr. Hamilton.    Thank you for involving us in this patient's care.    Dinorah Do MD  Gastroenterology/Hepatology Fellow, PGY-VI    NON-URGENT CONSULTS:  Please email [email or web address]  AT NIGHT AND ON WEEKENDS:  Contact on-call GI fellow via answering service ([phone]) from 5pm-8am and on weekends/holidays  MONDAY-FRIDAY 8AM-5PM:  Pager# [phone] ([facility])  GI Phone# [phone] ([facility]) Brief Update Note    Contacted by primary regarding concerns for worsening lipase and abdominal pain while on TFs    Remains afebrile, on ECMO.  Liver chemistries remain wnl and no biliary dilatation on imaging.  Prior recommendations remain; not a candidate for endoscopic intervention and no clear indication at this time.    - Obtain repeat abdominal imaging if able, [facility] will follow up  - Hold TFs  - Daily CBC, CMP, coags  - Ensure adequate hydration  - Do not need to trend lipase  - Conveyed to primary  - Rest of care per CTU  - Please see prior note for full recs    Discussed with [facility] Attending, Dr. Hamilton.    Thank you for involving us in this patient's care.    Dinorah Do MD  Gastroenterology/Hepatology Fellow, PGY-VI    NON-URGENT CONSULTS:  Please email [email or web address]  [email or web address]  AT NIGHT AND ON WEEKENDS:  Contact on-call GI fellow via answering service ([phone]) from 5pm-8am and on weekends/holidays  MONDAY-FRIDAY 8AM-5PM:  Pager# [phone] ([facility])  GI Phone# [phone] ([facility])

## 2022-12-01 NOTE — PROGRESS NOTE ADULT - PROBLEM SELECTOR PLAN 2
In setting of diuretic use. SNa elevated at 151 today. Recommend increase free water flushes to 400 cc q4 hours. Monitor SNa. In setting of diuretic use. SNa elevated at 151 today. Continue free water flushes   Monitor SNa.  Can use thiazide for hypernatremia and volume overload in addition to loop

## 2022-12-01 NOTE — PROGRESS NOTE ADULT - ATTENDING COMMENTS
CXR worsening though filling pressures are normal on RHC. Could be component of ARDS from pancreatitis or pulmonary edema from ECMO loading not yet reflected in filling pressures. Reasonable to pursue LHC and placement of IABP for venting and eventual ECMO weaning. Discussed on multidisciplinary rounds and plan is to continue full support with ECMO pending improvement in pancreatitis and possible ARDS. GI/surgery to be re-engaged today. Continue diuresis.

## 2022-12-01 NOTE — PROCEDURE NOTE - NSBRONCHPROCDETAILS_GEN_A_CORE_FT
Indication: Pulmonary edema    Pre-op Dx: s/p Cardiac arrest on VA ECMO    History: Admitted to SICU with elevated troponin and c/f gallstone pancreatitis with progression to ARDS now s/p ROSC/ Cardiac arrest on VA ECMO    Preop medication: propofol    Xray Findings: interstitial edema     Findings:  Bronchoscope inserted through ETT. ETT noted to be in good position. Airway evaluation revealed Sharp Maggie. ANNABEL and LLL evaluation revealed minimal thin clear secreations. RUL, RML, RLL revealed minimal thin clear secreations. . Bronchoscope then withdrawn from ETT. Minimal bleeding noted    Specimens: SCX
Argatroban held x2 hours pre-procedure  1 pack plt given     Findings:  Bronchoscope inserted through ETT. ETT noted to be in good position. Airway evaluation revealed Sharp Maggie. Majority of the trachea and mainstem bronchi extremely erythematou, large patchy areas of redness and irritation. ANNABEL/Lingula revealed mildly erythematous tissue, small amount of frothy pink secretions, and LLL evaluation revealed clear airways with minimal erythema. RUL had large area of clotted old blood, area lavaged with no active bleeding seen. RML, RLL revealed pink healthy appearing tissue, much less erythematous than higher airways. Bronchoscope then withdrawn from ETT. No bleeding noted.  Airways lavaged with icy saline.  Brown/old blood appearing secretions suctioned out    Specimens: BAL collected and sent  CXR ordered.  Pt tolerated well.

## 2022-12-01 NOTE — PROGRESS NOTE ADULT - ASSESSMENT
61 YO M with a history of CAD s/p 4v CABG ~2019 in [facility], DM2 (A1c 7.3%), and HTN who initially presented to OSH with abdominal pain and n/v and found to have pancreatitis with lipase > 3000 though also with elevated troponins. CT abdomen revealed acute pancreatitis and his initial LVEF was 40-45%. He developed MSOF with hypoxic respiratory failure requiring intubation and ERIC and went into hypoxic PEA arrest requiring ACLS and due to persistent hypoxia and hypotension on pressors after ROSC was cannulated to VA ECMO (LFV, RFA, no anterograde perfusion catheter) on 11/25. Notably CTH that day revealed small subdural hemorrhage.     His post arrest TTE on 11/26 showed a signficantly worse LVEF of 5-10% with an AV that was only minimally opening. Since then he has had improvement in his LV function (now ~35-40%) and improved pulsatility while tolerating progressive slow ECMO weans. ECMO turndown (note in chart 11/30) was reassuring for ability to decannulate to IABP/inotropes. He is awaiting LHC (IABP at same time) when renal function improving and per CTS are deferring ECMO decannulation at this time.  61 YO M with a history of CAD s/p 4v CABG ~2019 in [facility], DM2 (A1c 7.3%), and HTN who initially presented to OSH with abdominal pain and n/v and found to have pancreatitis with lipase > 3000 though also with elevated troponins. CT abdomen revealed acute pancreatitis and his initial LVEF was 40-45%. He developed MSOF with hypoxic respiratory failure requiring intubation and ERIC and went into hypoxic PEA arrest requiring ACLS and due to persistent hypoxia and hypotension on pressors after ROSC was cannulated to VA ECMO (LFV, RFA, no anterograde perfusion catheter) on 11/25. Notably CTH that day revealed small subdural hemorrhage.     His post arrest TTE on 11/26 showed a signficantly worse LVEF of 5-10% with an AV that was only minimally opening. Since then he has had improvement in his LV function (now ~35-40%) and improved pulsatility while tolerating progressive slow ECMO weans. ECMO turndown (note in chart 11/30) was reassuring for ability to decannulate to IABP/inotropes. He is awaiting LHC (IABP at same time) when renal function improving and per CTS are deferring ECMO decannulation at this time.  61 YO M with a history of CAD s/p 4v CABG ~2019 in [facility], DM2 (A1c 7.3%), and HTN who initially presented to OSH with abdominal pain and n/v and found to have pancreatitis with lipase > 3000 though also with elevated troponins. CT abdomen revealed acute pancreatitis and his initial LVEF was 40-45%. He developed MSOF with hypoxic respiratory failure requiring intubation and ERIC and went into hypoxic PEA arrest requiring ACLS and due to persistent hypoxia and hypotension on pressors after ROSC was cannulated to VA ECMO (LFV, RFA, no anterograde perfusion catheter) on 11/25. Notably CTH that day revealed small subdural hemorrhage.     His post arrest TTE on 11/26 showed a signficantly worse LVEF of 5-10% with an AV that was only minimally opening. Since then he has had improvement in his LV function (now ~35-40%) and improved pulsatility while tolerating progressive slow ECMO weans. ECMO turndown (note in chart 11/30) was reassuring for ability to decannulate to IABP/inotropes. He is awaiting LHC (IABP at same time) when renal function improving and per CTS are deferring ECMO decannulation at this time.  63 YO M with a history of CAD s/p 4v CABG ~2019 in [facility], DM2 (A1c 7.3%), and HTN who initially presented to OSH with abdominal pain and n/v and found to have pancreatitis with lipase > 3000 though also with elevated troponins. CT abdomen revealed acute pancreatitis and his initial LVEF was 40-45%. He developed MSOF with hypoxic respiratory failure requiring intubation and ERIC and went into hypoxic PEA arrest requiring ACLS and due to persistent hypoxia and hypotension on pressors after ROSC was cannulated to VA ECMO (LFV, RFA, no anterograde perfusion catheter) on 11/25. Notably CTH that day revealed small subdural hemorrhage.     His post arrest TTE on 11/26 showed a signficantly worse LVEF of 5-10% with an AV that was only minimally opening. Since then he has had improvement in his LV function (now ~35-40%) and improved pulsatility while tolerating progressive slow ECMO weans. ECMO turndown (note in chart 11/30) was reassuring for ability to decannulate to IABP/inotropes. He is awaiting LHC (IABP at same time) when renal function improving and per CTS are deferring ECMO decannulation at this time until pancreatitis/ARDS has improved.  61 YO M with a history of CAD s/p 4v CABG ~2019 in [facility], DM2 (A1c 7.3%), and HTN who initially presented to OSH with abdominal pain and n/v and found to have pancreatitis with lipase > 3000 though also with elevated troponins. CT abdomen revealed acute pancreatitis and his initial LVEF was 40-45%. He developed MSOF with hypoxic respiratory failure requiring intubation and ERIC and went into hypoxic PEA arrest requiring ACLS and due to persistent hypoxia and hypotension on pressors after ROSC was cannulated to VA ECMO (LFV, RFA, no anterograde perfusion catheter) on 11/25. Notably CTH that day revealed small subdural hemorrhage.     His post arrest TTE on 11/26 showed a signficantly worse LVEF of 5-10% with an AV that was only minimally opening. Since then he has had improvement in his LV function (now ~35-40%) and improved pulsatility while tolerating progressive slow ECMO weans. ECMO turndown (note in chart 11/30) was reassuring for ability to decannulate to IABP/inotropes. He is awaiting LHC (IABP at same time) when renal function improving and per CTS are deferring ECMO decannulation at this time until pancreatitis/ARDS has improved.  63 YO M with a history of CAD s/p 4v CABG ~2019 in [facility], DM2 (A1c 7.3%), and HTN who initially presented to OSH with abdominal pain and n/v and found to have pancreatitis with lipase > 3000 though also with elevated troponins. CT abdomen revealed acute pancreatitis and his initial LVEF was 40-45%. He developed MSOF with hypoxic respiratory failure requiring intubation and ERIC and went into hypoxic PEA arrest requiring ACLS and due to persistent hypoxia and hypotension on pressors after ROSC was cannulated to VA ECMO (LFV, RFA, no anterograde perfusion catheter) on 11/25. Notably CTH that day revealed small subdural hemorrhage.     His post arrest TTE on 11/26 showed a signficantly worse LVEF of 5-10% with an AV that was only minimally opening. Since then he has had improvement in his LV function (now ~35-40%) and improved pulsatility while tolerating progressive slow ECMO weans. ECMO turndown (note in chart 11/30) was reassuring for ability to decannulate to IABP/inotropes. He is awaiting LHC (IABP at same time) when renal function improving and per CTS are deferring ECMO decannulation at this time until pancreatitis/ARDS has improved.

## 2022-12-01 NOTE — PROGRESS NOTE ADULT - SUBJECTIVE AND OBJECTIVE BOX
GENERAL SURGERY PROGRESS NOTE    SUBJECTIVE  Patient seen and examined. No acute events overnight. Reports tolerating diet without nausea, vomiting, passing flatus, having bowel movements, voiding without issues, have been ambulating and out of bed. Denies fever, chills, SOB, chest pain.         OBJECTIVE    PHYSICAL EXAM  General: NAD  Cardiac: On ECMO  Respiratory: Intubated  Abdomen: Soft, non-distended, non-tender, no rebound, no guarding.  Ext:  Moving b/l upper and lower extremities. Palpable Radial b/l UE, b/l DP palpable in LE.     T(C): 36 (12-01-22 @ 12:00), Max: 37.4 (11-30-22 @ 20:00)  HR: 64 (12-01-22 @ 14:30) (63 - 77)  BP: --  RR: 15 (12-01-22 @ 14:30) (5 - 33)  SpO2: 100% (12-01-22 @ 14:30) (98% - 100%)    11-30-22 @ 07:01  -  12-01-22 @ 07:00  --------------------------------------------------------  IN: 4149.7 mL / OUT: 5675 mL / NET: -1525.3 mL    12-01-22 @ 07:01  -  12-01-22 @ 14:59  --------------------------------------------------------  IN: 1083.5 mL / OUT: 1495 mL / NET: -411.5 mL        MEDICATIONS  argatroban Infusion 0.1 MICROgram(s)/kG/Min IV Continuous <Continuous>  artificial  tears Solution 1 Drop(s) Both EYES four times a day  atorvastatin 40 milliGRAM(s) Oral at bedtime  buMETAnide Injectable 2 milliGRAM(s) IV Push every 12 hours  chlorhexidine 0.12% Liquid 15 milliLiter(s) Oral Mucosa every 12 hours  chlorhexidine 2% Cloths 1 Application(s) Topical <User Schedule>  dexMEDEtomidine Infusion 1.5 MICROgram(s)/kG/Hr IV Continuous <Continuous>  dextrose 50% Injectable 50 milliLiter(s) IV Push every 15 minutes  HYDROmorphone  Injectable 0.5 milliGRAM(s) IV Push every 4 hours PRN  insulin regular Infusion 6 Unit(s)/Hr IV Continuous <Continuous>  meropenem  IVPB 500 milliGRAM(s) IV Intermittent every 12 hours  metolazone 10 milliGRAM(s) Oral daily  niCARdipine Infusion 2 mG/Hr IV Continuous <Continuous>  pantoprazole  Injectable 40 milliGRAM(s) IV Push two times a day  polyethylene glycol 3350 17 Gram(s) Oral daily  senna 2 Tablet(s) Oral at bedtime  sodium chloride 0.9% lock flush 10 milliLiter(s) IV Push every 1 hour PRN  sodium chloride 0.9%. 1000 milliLiter(s) IV Continuous <Continuous>      LABS                        8.1    11.23 )-----------( 90       ( 01 Dec 2022 01:08 )             24.7     12-01    151<H>  |  119<H>  |  70<H>  ----------------------------<  139<H>  4.0   |  19<L>  |  3.01<H>    Ca    8.1<L>      01 Dec 2022 01:08  Phos  3.1     12-01  Mg     2.4     12-01    TPro  5.9<L>  /  Alb  3.2<L>  /  TBili  0.8  /  DBili  x   /  AST  41<H>  /  ALT  10  /  AlkPhos  102  12-01    PT/INR - ( 01 Dec 2022 01:08 )   PT: 15.3 sec;   INR: 1.33 ratio         PTT - ( 01 Dec 2022 09:02 )  PTT:55.4 sec      RADIOLOGY & ADDITIONAL STUDIES

## 2022-12-01 NOTE — PROGRESS NOTE ADULT - PROBLEM SELECTOR PLAN 5
- likely arrest associated ATN   - UOP is good, urinated 5.6L over last 24hr, net negative 1.5L  - Cr 3 from 3.9  - no indication for dialysis  - renal following.

## 2022-12-01 NOTE — PROGRESS NOTE ADULT - PROBLEM SELECTOR PLAN 1
- continue supportive care with [facility] ECMO (LFV/RFA, no anterograde perfusion)  - monitor distal pulses closely  - ECMO wean performed on 11/30 and favorable, we would favor LHC followed by decannulation to IABP +/- inotropes. constant LV loading is worsening pulmonary edema  - eventual LHC to assess coronaries and IABP pending improvement in renal function   - cont argastroban for AC while on ECMO  - advanced therapies evaluation premature at this time given hope for recovery and progress at this time.  - diurese to goal net negative 2L. - continue [facility] ECMO (LFV/RFA, no anterograde perfusion), currently flowing at 3L/min. favor weaning sweep as tolerates  - monitor distal pulses closely  - ECMO wean performed on 11/30 and favorable, we would favor LHC followed by decannulation to IABP +/- inotropes with/without transition to VV. constant LV loading may be worsening pulmonary edema. MDR discussion to continue ECMO until pancreatitis/ARDS improved  - eventual LHC to assess coronaries and IABP pending improvement in renal function   - cont argatroban for AC while on ECMO  - diurese to goal net negative 2L.

## 2022-12-01 NOTE — PROGRESS NOTE ADULT - SUBJECTIVE AND OBJECTIVE BOX
Subjective:    Day 6 on [facility] ECMO  follows commands on sedation  received 1U pRBC  answered affirmative to abd pain  bronch yesterday showing blood secretions,  negative otherwise  on VA ECMO 3LPM 3200rpm sweep 3, FdO2 100%  FiO2 on vent reduced to 40%, PaO2 right arterial 90    Medications:  argatroban Infusion 0.1 MICROgram(s)/kG/Min IV Continuous <Continuous>  artificial  tears Solution 1 Drop(s) Both EYES four times a day  atorvastatin 40 milliGRAM(s) Oral at bedtime  buMETAnide Injectable 2 milliGRAM(s) IV Push every 12 hours  chlorhexidine 0.12% Liquid 15 milliLiter(s) Oral Mucosa every 12 hours  chlorhexidine 2% Cloths 1 Application(s) Topical <User Schedule>  dexMEDEtomidine Infusion 1.5 MICROgram(s)/kG/Hr IV Continuous <Continuous>  dextrose 50% Injectable 50 milliLiter(s) IV Push every 15 minutes  HYDROmorphone  Injectable 0.5 milliGRAM(s) IV Push every 4 hours PRN  insulin regular Infusion 6 Unit(s)/Hr IV Continuous <Continuous>  meropenem  IVPB 500 milliGRAM(s) IV Intermittent every 12 hours  metolazone 10 milliGRAM(s) Oral daily  niCARdipine Infusion 2 mG/Hr IV Continuous <Continuous>  pantoprazole  Injectable 40 milliGRAM(s) IV Push two times a day  polyethylene glycol 3350 17 Gram(s) Oral daily  senna 2 Tablet(s) Oral at bedtime  sodium chloride 0.9% lock flush 10 milliLiter(s) IV Push every 1 hour PRN  sodium chloride 0.9%. 1000 milliLiter(s) IV Continuous <Continuous>      Physical Exam:    Vitals:  Vital Signs Last 24 Hours  T(C): 36 (22 @ 12:00), Max: 37.4 (22 @ 20:00)  HR: 65 (22 @ 13:45) (63 - 77)  BP: --  RR: 7 (22 @ 13:45) (5 - 33)  SpO2: 100% (22 @ 13:45) (98% - 100%)    Weight in k.2 ( @ 00:00)    I&O's Summary    2022 07:01  -  01 Dec 2022 07:00  --------------------------------------------------------  IN: 4149.7 mL / OUT: 5675 mL / NET: -1525.3 mL    01 Dec 2022 07:01  -  01 Dec 2022 13:51  --------------------------------------------------------  IN: 1083.5 mL / OUT: 1495 mL / NET: -411.5 mL        Tele:    sedated, intubated, follows commands  neck supple no JVD, CVP ~6  heart RRR s1s2 no m   Lungs coarse b/s b/l   Ext warm well perfused, no edema      Labs:                        8.1    11.23 )-----------( 90       ( 01 Dec 2022 01:08 )             24.7     12-    151<H>  |  119<H>  |  70<H>  ----------------------------<  139<H>  4.0   |  19<L>  |  3.01<H>    Ca    8.1<L>      01 Dec 2022 01:08  Phos  3.1     12  Mg     2.4         TPro  5.9<L>  /  Alb  3.2<L>  /  TBili  0.8  /  DBili  x   /  AST  41<H>  /  ALT  10  /  AlkPhos  102  12    PT/INR - ( 01 Dec 2022 01:08 )   PT: 15.3 sec;   INR: 1.33 ratio         PTT - ( 01 Dec 2022 09:02 )  PTT:55.4 sec      Creatine Kinase, Serum: 162 U/L (22 @ 00:26)  Creatine Kinase, Serum: 162 U/L (22 @ 00:26)    Oxygen Saturation, Mixed: 72.3 ( @ 01:00)  Oxygen Saturation, Mixed: 75.9 ( @ 06:03)  Oxygen Saturation, Mixed: 79.4 ( @ 02:50)  Oxygen Saturation, Mixed: 78.6 ( @ 00:00)  Oxygen Saturation, Mixed: 74.1 ( @ 19:56)  Oxygen Saturation, Mixed: 75.8 ( @ 15:06)    Lactate Dehydrogenase, Serum: 494 U/L ( @ 01:08)  Lactate Dehydrogenase, Serum: 527 U/L ( @ 00:43)  Lactate Dehydrogenase, Serum: 650 U/L ( @ 00:26)

## 2022-12-01 NOTE — PROGRESS NOTE ADULT - ASSESSMENT
63 y/o male with PMH of CABG, DM, HTN, HLD presents with abdominal pain, diarrhea and n/v for 1 day. Found to have acute cholecystitis, gallstone pancreatitis. Now with resp failure intubated on ECMO    Plan:  - no acute surgical intervention at this time  - continue non operative management for acute cholecystitis with gallstone pancreatitis, possible ERCP once stable with GI  - Repeat abdominal CT if concern for worsening pancreatitis     Surgery  [id number] 63 y/o male with PMH of CABG, DM, HTN, HLD presents with abdominal pain, diarrhea and n/v for 1 day. Found to have acute cholecystitis, gallstone pancreatitis. Now with resp failure intubated on ECMO    Plan:  - no acute surgical intervention at this time  - continue non operative management for acute cholecystitis with gallstone pancreatitis, possible ERCP once stable with GI  - Repeat abdominal CT if concern for worsening pancreatitis     Surgery  [id number] 63 y/o male with PMH of CABG, DM, HTN, HLD presents with abdominal pain, diarrhea and n/v for 1 day. Found to have acute cholecystitis, gallstone pancreatitis. Now with resp failure intubated on ECMO    Plan:  - no acute surgical intervention at this time  - continue non operative management for acute cholecystitis with gallstone pancreatitis, possible ERCP once stable with GI  - Repeat abdominal CT if concern for worsening pancreatitis     Surgery  [id number]

## 2022-12-02 LAB
ALBUMIN SERPL ELPH-MCNC: 3.6 G/DL — SIGNIFICANT CHANGE UP (ref 3.3–5)
ALP SERPL-CCNC: 106 U/L — SIGNIFICANT CHANGE UP (ref 40–120)
ALT FLD-CCNC: 10 U/L — SIGNIFICANT CHANGE UP (ref 10–45)
AMYLASE P1 CFR SERPL: 470 U/L — HIGH (ref 25–125)
ANION GAP SERPL CALC-SCNC: 14 MMOL/L — SIGNIFICANT CHANGE UP (ref 5–17)
APTT BLD: 43.2 SEC — HIGH (ref 27.5–35.5)
AST SERPL-CCNC: 38 U/L — SIGNIFICANT CHANGE UP (ref 10–40)
BASE EXCESS BLDMV CALC-SCNC: -3.9 MMOL/L — LOW (ref -3–3)
BASE EXCESS BLDMV CALC-SCNC: -4.7 MMOL/L — LOW (ref -3–3)
BILIRUB SERPL-MCNC: 1.1 MG/DL — SIGNIFICANT CHANGE UP (ref 0.2–1.2)
BUN SERPL-MCNC: 76 MG/DL — HIGH (ref 7–23)
CALCIUM SERPL-MCNC: 8.3 MG/DL — LOW (ref 8.4–10.5)
CHLORIDE SERPL-SCNC: 114 MMOL/L — HIGH (ref 96–108)
CO2 BLDMV-SCNC: 22 MMOL/L — SIGNIFICANT CHANGE UP (ref 21–29)
CO2 SERPL-SCNC: 20 MMOL/L — LOW (ref 22–31)
CREAT SERPL-MCNC: 3.28 MG/DL — HIGH (ref 0.5–1.3)
CULTURE RESULTS: NO GROWTH — SIGNIFICANT CHANGE UP
EGFR: 20 ML/MIN/1.73M2 — LOW
FIBRINOGEN PPP-MCNC: 190 MG/DL — LOW (ref 200–445)
GAS PNL BLDA: SIGNIFICANT CHANGE UP
GAS PNL BLDMV: SIGNIFICANT CHANGE UP
GLUCOSE BLDC GLUCOMTR-MCNC: 101 MG/DL — HIGH (ref 70–99)
GLUCOSE BLDC GLUCOMTR-MCNC: 106 MG/DL — HIGH (ref 70–99)
GLUCOSE BLDC GLUCOMTR-MCNC: 109 MG/DL — HIGH (ref 70–99)
GLUCOSE BLDC GLUCOMTR-MCNC: 116 MG/DL — HIGH (ref 70–99)
GLUCOSE BLDC GLUCOMTR-MCNC: 117 MG/DL — HIGH (ref 70–99)
GLUCOSE BLDC GLUCOMTR-MCNC: 122 MG/DL — HIGH (ref 70–99)
GLUCOSE BLDC GLUCOMTR-MCNC: 125 MG/DL — HIGH (ref 70–99)
GLUCOSE BLDC GLUCOMTR-MCNC: 127 MG/DL — HIGH (ref 70–99)
GLUCOSE BLDC GLUCOMTR-MCNC: 128 MG/DL — HIGH (ref 70–99)
GLUCOSE BLDC GLUCOMTR-MCNC: 131 MG/DL — HIGH (ref 70–99)
GLUCOSE BLDC GLUCOMTR-MCNC: 132 MG/DL — HIGH (ref 70–99)
GLUCOSE BLDC GLUCOMTR-MCNC: 136 MG/DL — HIGH (ref 70–99)
GLUCOSE BLDC GLUCOMTR-MCNC: 137 MG/DL — HIGH (ref 70–99)
GLUCOSE BLDC GLUCOMTR-MCNC: 141 MG/DL — HIGH (ref 70–99)
GLUCOSE BLDC GLUCOMTR-MCNC: 145 MG/DL — HIGH (ref 70–99)
GLUCOSE BLDC GLUCOMTR-MCNC: 147 MG/DL — HIGH (ref 70–99)
GLUCOSE BLDC GLUCOMTR-MCNC: 150 MG/DL — HIGH (ref 70–99)
GLUCOSE BLDC GLUCOMTR-MCNC: 151 MG/DL — HIGH (ref 70–99)
GLUCOSE BLDC GLUCOMTR-MCNC: 152 MG/DL — HIGH (ref 70–99)
GLUCOSE BLDC GLUCOMTR-MCNC: 154 MG/DL — HIGH (ref 70–99)
GLUCOSE BLDC GLUCOMTR-MCNC: 157 MG/DL — HIGH (ref 70–99)
GLUCOSE BLDC GLUCOMTR-MCNC: 96 MG/DL — SIGNIFICANT CHANGE UP (ref 70–99)
GLUCOSE BLDC GLUCOMTR-MCNC: 98 MG/DL — SIGNIFICANT CHANGE UP (ref 70–99)
GLUCOSE SERPL-MCNC: 142 MG/DL — HIGH (ref 70–99)
GRAM STN FLD: SIGNIFICANT CHANGE UP
HAPTOGLOB SERPL-MCNC: 214 MG/DL — HIGH (ref 34–200)
HCO3 BLDMV-SCNC: 20 MMOL/L — SIGNIFICANT CHANGE UP (ref 20–28)
HCO3 BLDMV-SCNC: 21 MMOL/L — SIGNIFICANT CHANGE UP (ref 20–28)
HCT VFR BLD CALC: 26.1 % — LOW (ref 39–50)
HGB BLD-MCNC: 8.6 G/DL — LOW (ref 13–17)
HOROWITZ INDEX BLDMV+IHG-RTO: 100 — SIGNIFICANT CHANGE UP
INR BLD: 1.38 RATIO — HIGH (ref 0.88–1.16)
LDH SERPL L TO P-CCNC: 462 U/L — HIGH (ref 50–242)
LIDOCAIN IGE QN: 916 U/L — HIGH (ref 7–60)
MAGNESIUM SERPL-MCNC: 2.2 MG/DL — SIGNIFICANT CHANGE UP (ref 1.6–2.6)
MCHC RBC-ENTMCNC: 29.3 PG — SIGNIFICANT CHANGE UP (ref 27–34)
MCHC RBC-ENTMCNC: 33 GM/DL — SIGNIFICANT CHANGE UP (ref 32–36)
MCV RBC AUTO: 88.8 FL — SIGNIFICANT CHANGE UP (ref 80–100)
NIGHT BLUE STAIN TISS: SIGNIFICANT CHANGE UP
NRBC # BLD: 0 /100 WBCS — SIGNIFICANT CHANGE UP (ref 0–0)
O2 CT VFR BLD CALC: 37 MMHG — SIGNIFICANT CHANGE UP (ref 30–65)
O2 CT VFR BLD CALC: 42 MMHG — SIGNIFICANT CHANGE UP (ref 30–65)
PCO2 BLDMV: 36 MMHG — SIGNIFICANT CHANGE UP (ref 30–65)
PCO2 BLDMV: 37 MMHG — SIGNIFICANT CHANGE UP (ref 30–65)
PH BLDMV: 7.35 — SIGNIFICANT CHANGE UP (ref 7.32–7.45)
PH BLDMV: 7.37 — SIGNIFICANT CHANGE UP (ref 7.32–7.45)
PHOSPHATE SERPL-MCNC: 4 MG/DL — SIGNIFICANT CHANGE UP (ref 2.5–4.5)
PLATELET # BLD AUTO: 119 K/UL — LOW (ref 150–400)
POTASSIUM SERPL-MCNC: 3.9 MMOL/L — SIGNIFICANT CHANGE UP (ref 3.5–5.3)
POTASSIUM SERPL-SCNC: 3.9 MMOL/L — SIGNIFICANT CHANGE UP (ref 3.5–5.3)
PROT SERPL-MCNC: 6.1 G/DL — SIGNIFICANT CHANGE UP (ref 6–8.3)
PROTHROM AB SERPL-ACNC: 16.1 SEC — HIGH (ref 10.5–13.4)
RBC # BLD: 2.94 M/UL — LOW (ref 4.2–5.8)
RBC # FLD: 15.3 % — HIGH (ref 10.3–14.5)
SAO2 % BLDMV: 67.4 — SIGNIFICANT CHANGE UP (ref 60–90)
SAO2 % BLDMV: 69 — SIGNIFICANT CHANGE UP (ref 60–90)
SODIUM SERPL-SCNC: 148 MMOL/L — HIGH (ref 135–145)
SPECIMEN SOURCE: SIGNIFICANT CHANGE UP
UFH PPP CHRO-ACNC: 0.03 IU/ML — LOW (ref 0.3–0.7)
WBC # BLD: 13.78 K/UL — HIGH (ref 3.8–10.5)
WBC # FLD AUTO: 13.78 K/UL — HIGH (ref 3.8–10.5)

## 2022-12-02 PROCEDURE — 99291 CRITICAL CARE FIRST HOUR: CPT

## 2022-12-02 PROCEDURE — 99292 CRITICAL CARE ADDL 30 MIN: CPT

## 2022-12-02 PROCEDURE — 99291 CRITICAL CARE FIRST HOUR: CPT | Mod: 25

## 2022-12-02 PROCEDURE — 99232 SBSQ HOSP IP/OBS MODERATE 35: CPT

## 2022-12-02 PROCEDURE — 99292 CRITICAL CARE ADDL 30 MIN: CPT | Mod: 25

## 2022-12-02 PROCEDURE — 99233 SBSQ HOSP IP/OBS HIGH 50: CPT | Mod: GC

## 2022-12-02 PROCEDURE — 71045 X-RAY EXAM CHEST 1 VIEW: CPT | Mod: 26

## 2022-12-02 PROCEDURE — 99232 SBSQ HOSP IP/OBS MODERATE 35: CPT | Mod: GC

## 2022-12-02 RX ORDER — ALBUMIN HUMAN 25 %
100 VIAL (ML) INTRAVENOUS ONCE
Refills: 0 | Status: COMPLETED | OUTPATIENT
Start: 2022-12-02 | End: 2022-12-02

## 2022-12-02 RX ORDER — PROPOFOL 10 MG/ML
17.94 INJECTION, EMULSION INTRAVENOUS
Qty: 1000 | Refills: 0 | Status: DISCONTINUED | OUTPATIENT
Start: 2022-12-02 | End: 2022-12-07

## 2022-12-02 RX ORDER — POTASSIUM CHLORIDE 20 MEQ
10 PACKET (EA) ORAL
Refills: 0 | Status: COMPLETED | OUTPATIENT
Start: 2022-12-02 | End: 2022-12-02

## 2022-12-02 RX ORDER — SODIUM CHLORIDE 9 MG/ML
500 INJECTION INTRAMUSCULAR; INTRAVENOUS; SUBCUTANEOUS ONCE
Refills: 0 | Status: COMPLETED | OUTPATIENT
Start: 2022-12-02 | End: 2022-12-02

## 2022-12-02 RX ORDER — CALCIUM GLUCONATE 100 MG/ML
1 VIAL (ML) INTRAVENOUS ONCE
Refills: 0 | Status: COMPLETED | OUTPATIENT
Start: 2022-12-02 | End: 2022-12-02

## 2022-12-02 RX ORDER — SODIUM BICARBONATE 1 MEQ/ML
50 SYRINGE (ML) INTRAVENOUS ONCE
Refills: 0 | Status: COMPLETED | OUTPATIENT
Start: 2022-12-02 | End: 2022-12-02

## 2022-12-02 RX ORDER — NOREPINEPHRINE BITARTRATE/D5W 8 MG/250ML
0.05 PLASTIC BAG, INJECTION (ML) INTRAVENOUS
Qty: 8 | Refills: 0 | Status: DISCONTINUED | OUTPATIENT
Start: 2022-12-02 | End: 2022-12-08

## 2022-12-02 RX ADMIN — CHLORHEXIDINE GLUCONATE 15 MILLILITER(S): 213 SOLUTION TOPICAL at 17:35

## 2022-12-02 RX ADMIN — PROPOFOL 10 MICROGRAM(S)/KG/MIN: 10 INJECTION, EMULSION INTRAVENOUS at 21:48

## 2022-12-02 RX ADMIN — Medication 50 MILLIEQUIVALENT(S): at 07:45

## 2022-12-02 RX ADMIN — Medication 1 DROP(S): at 23:06

## 2022-12-02 RX ADMIN — Medication 50 MILLIEQUIVALENT(S): at 06:58

## 2022-12-02 RX ADMIN — SENNA PLUS 2 TABLET(S): 8.6 TABLET ORAL at 21:46

## 2022-12-02 RX ADMIN — MEROPENEM 100 MILLIGRAM(S): 1 INJECTION INTRAVENOUS at 17:34

## 2022-12-02 RX ADMIN — Medication 1 DROP(S): at 01:07

## 2022-12-02 RX ADMIN — INSULIN HUMAN 6 UNIT(S)/HR: 100 INJECTION, SOLUTION SUBCUTANEOUS at 21:49

## 2022-12-02 RX ADMIN — Medication 100 GRAM(S): at 06:57

## 2022-12-02 RX ADMIN — CHLORHEXIDINE GLUCONATE 1 APPLICATION(S): 213 SOLUTION TOPICAL at 06:43

## 2022-12-02 RX ADMIN — Medication 1 DROP(S): at 05:37

## 2022-12-02 RX ADMIN — SODIUM CHLORIDE 500 MILLILITER(S): 9 INJECTION INTRAMUSCULAR; INTRAVENOUS; SUBCUTANEOUS at 00:00

## 2022-12-02 RX ADMIN — ATORVASTATIN CALCIUM 40 MILLIGRAM(S): 80 TABLET, FILM COATED ORAL at 21:46

## 2022-12-02 RX ADMIN — Medication 1 DROP(S): at 12:02

## 2022-12-02 RX ADMIN — Medication 50 MILLILITER(S): at 18:10

## 2022-12-02 RX ADMIN — ARGATROBAN 0.56 MICROGRAM(S)/KG/MIN: 50 INJECTION, SOLUTION INTRAVENOUS at 21:48

## 2022-12-02 RX ADMIN — Medication 50 MILLIEQUIVALENT(S): at 00:30

## 2022-12-02 RX ADMIN — POLYETHYLENE GLYCOL 3350 17 GRAM(S): 17 POWDER, FOR SOLUTION ORAL at 12:52

## 2022-12-02 RX ADMIN — CHLORHEXIDINE GLUCONATE 15 MILLILITER(S): 213 SOLUTION TOPICAL at 05:36

## 2022-12-02 RX ADMIN — Medication 50 MILLIEQUIVALENT(S): at 08:30

## 2022-12-02 RX ADMIN — SODIUM CHLORIDE 5 MILLILITER(S): 9 INJECTION INTRAMUSCULAR; INTRAVENOUS; SUBCUTANEOUS at 21:49

## 2022-12-02 RX ADMIN — Medication 1 DROP(S): at 17:35

## 2022-12-02 RX ADMIN — MEROPENEM 100 MILLIGRAM(S): 1 INJECTION INTRAVENOUS at 05:36

## 2022-12-02 RX ADMIN — Medication 50 MILLIEQUIVALENT(S): at 01:00

## 2022-12-02 RX ADMIN — DEXMEDETOMIDINE HYDROCHLORIDE IN 0.9% SODIUM CHLORIDE 34.8 MICROGRAM(S)/KG/HR: 4 INJECTION INTRAVENOUS at 21:49

## 2022-12-02 RX ADMIN — BUMETANIDE 2 MILLIGRAM(S): 0.25 INJECTION INTRAMUSCULAR; INTRAVENOUS at 05:35

## 2022-12-02 RX ADMIN — Medication 8.71 MICROGRAM(S)/KG/MIN: at 21:49

## 2022-12-02 RX ADMIN — PANTOPRAZOLE SODIUM 40 MILLIGRAM(S): 20 TABLET, DELAYED RELEASE ORAL at 05:35

## 2022-12-02 RX ADMIN — PROPOFOL 10 MICROGRAM(S)/KG/MIN: 10 INJECTION, EMULSION INTRAVENOUS at 05:36

## 2022-12-02 RX ADMIN — Medication 8.71 MICROGRAM(S)/KG/MIN: at 05:36

## 2022-12-02 RX ADMIN — PANTOPRAZOLE SODIUM 40 MILLIGRAM(S): 20 TABLET, DELAYED RELEASE ORAL at 17:34

## 2022-12-02 NOTE — PROGRESS NOTE ADULT - PROBLEM SELECTOR PLAN 2
troponin peaked at > 69215   - plan for LHC with IABP once recovers from ERIC  - continue statin  - would start ASA when okay with surgical team. troponin peaked at > 93685   - plan for LHC with IABP once recovers from ERIC  - continue statin  - would start ASA when okay with surgical team. troponin peaked at > 46970   - plan for LHC with IABP once recovers from ERIC  - continue statin  - would start ASA when okay with surgical team.

## 2022-12-02 NOTE — PROGRESS NOTE ADULT - SUBJECTIVE AND OBJECTIVE BOX
infectious diseases progress note:    Patient is a 62y old  Male who presents with a chief complaint of Acute cholecystitis, gallstone pancreatitis (02 Dec 2022 07:26)        Acute pancreatitis without infection or necrosis               Allergies    No Known Allergies    Intolerances        ANTIBIOTICS/RELEVANT:  antimicrobials  meropenem  IVPB 500 milliGRAM(s) IV Intermittent every 12 hours    immunologic:    OTHER:  argatroban Infusion 0.1 MICROgram(s)/kG/Min IV Continuous <Continuous>  artificial  tears Solution 1 Drop(s) Both EYES four times a day  atorvastatin 40 milliGRAM(s) Oral at bedtime  buMETAnide Injectable 2 milliGRAM(s) IV Push every 12 hours  chlorhexidine 0.12% Liquid 15 milliLiter(s) Oral Mucosa every 12 hours  chlorhexidine 2% Cloths 1 Application(s) Topical <User Schedule>  dexMEDEtomidine Infusion 1.5 MICROgram(s)/kG/Hr IV Continuous <Continuous>  dextrose 50% Injectable 50 milliLiter(s) IV Push every 15 minutes  HYDROmorphone  Injectable 0.5 milliGRAM(s) IV Push every 4 hours PRN  insulin regular Infusion 6 Unit(s)/Hr IV Continuous <Continuous>  metolazone 10 milliGRAM(s) Oral daily  niCARdipine Infusion 2 mG/Hr IV Continuous <Continuous>  norepinephrine Infusion 0.05 MICROgram(s)/kG/Min IV Continuous <Continuous>  pantoprazole  Injectable 40 milliGRAM(s) IV Push two times a day  polyethylene glycol 3350 17 Gram(s) Oral daily  potassium chloride  10 mEq/50 mL IVPB 10 milliEquivalent(s) IV Intermittent every 1 hour  propofol Infusion 17.94 MICROgram(s)/kG/Min IV Continuous <Continuous>  senna 2 Tablet(s) Oral at bedtime  sodium chloride 0.9% lock flush 10 milliLiter(s) IV Push every 1 hour PRN  sodium chloride 0.9%. 1000 milliLiter(s) IV Continuous <Continuous>      Objective:  Vital Signs Last 24 Hrs  T(C): 37.7 (02 Dec 2022 00:00), Max: 37.7 (02 Dec 2022 00:00)  T(F): 99.9 (02 Dec 2022 00:00), Max: 99.9 (02 Dec 2022 00:00)  HR: 69 (02 Dec 2022 07:45) (63 - 80)  BP: --  BP(mean): --  RR: 22 (02 Dec 2022 07:45) (0 - 27)  SpO2: 100% (02 Dec 2022 07:45) (99% - 100%)    Parameters below as of 02 Dec 2022 00:00  Patient On (Oxygen Delivery Method): ventilator    O2 Concentration (%): 40       Eyes:INOCENCIO, EOMI  Ear/Nose/Throat: no oral lesion, no sinus tenderness on percussion	  Neck:no JVD, no lymphadenopathy, supple  Respiratory: CTA lore  Cardiovascular: S1S2 RRR, no murmurs  Gastrointestinal:soft, (+) BS, no HSM  Extremities:no e/e/c        LABS:                        8.6    13.78 )-----------( 119      ( 02 Dec 2022 00:24 )             26.1     12    148<H>  |  114<H>  |  76<H>  ----------------------------<  142<H>  3.9   |  20<L>  |  3.28<H>    Ca    8.3<L>      02 Dec 2022 00:20  Phos  4.0     12-  Mg     2.2     12-    TPro  6.1  /  Alb  3.6  /  TBili  1.1  /  DBili  x   /  AST  38  /  ALT  10  /  AlkPhos  106  12-    PT/INR - ( 02 Dec 2022 00:24 )   PT: 16.1 sec;   INR: 1.38 ratio         PTT - ( 02 Dec 2022 00:24 )  PTT:43.2 sec  Urinalysis Basic - ( 01 Dec 2022 15:51 )    Color: LIGHT BROWN / Appearance: Clear / S.012 / pH: x  Gluc: x / Ketone: Negative  / Bili: Negative / Urobili: 6 mg/dL   Blood: x / Protein: 30 mg/dL / Nitrite: Negative   Leuk Esterase: Small / RBC: 34 /hpf / WBC 6 /HPF   Sq Epi: x / Non Sq Epi: 1 /hpf / Bacteria: Negative          MICROBIOLOGY:    RECENT CULTURES:   @ 13:10 .Bronchial Bronchial Lavage       Rare polymorphonuclear leukocytes per low power field  No squamous epithelial cells per low power field  No organisms seen           No growth     @ 15:24 .Blood Blood-Peripheral                No growth to date.     @ 18:11 ET Tube ET Tube       Moderate polymorphonuclear leukocytes per low power field  No Squamous epithelial cells per low power field  No organisms seen per oil power field           No growth at 48 hours     @ 22:55 ET Tube ET Tube       No polymorphonuclear leukocytes seen per low power field  No Squamous epithelial cells seen per low power field  No organisms seen per oil power field           No growth at 48 hours     @ 22:10 .Blood Blood-Peripheral                No Growth Final     @ 21:15 .Blood Blood-Peripheral                No Growth Final     @ 10:39 .Blood Blood-Peripheral                No Growth Final     @ 10:37 .Blood Blood-Peripheral                No Growth Final          RESPIRATORY CULTURES:              RADIOLOGY & ADDITIONAL STUDIES:        Pager [phone]  After 5 pm/weekends or if no response :[phone] infectious diseases progress note:    Patient is a 62y old  Male who presents with a chief complaint of Acute cholecystitis, gallstone pancreatitis (02 Dec 2022 07:26)        Acute pancreatitis without infection or necrosis               Allergies    No Known Allergies    Intolerances        ANTIBIOTICS/RELEVANT:  antimicrobials  meropenem  IVPB 500 milliGRAM(s) IV Intermittent every 12 hours    immunologic:    OTHER:  argatroban Infusion 0.1 MICROgram(s)/kG/Min IV Continuous <Continuous>  artificial  tears Solution 1 Drop(s) Both EYES four times a day  atorvastatin 40 milliGRAM(s) Oral at bedtime  buMETAnide Injectable 2 milliGRAM(s) IV Push every 12 hours  chlorhexidine 0.12% Liquid 15 milliLiter(s) Oral Mucosa every 12 hours  chlorhexidine 2% Cloths 1 Application(s) Topical <User Schedule>  dexMEDEtomidine Infusion 1.5 MICROgram(s)/kG/Hr IV Continuous <Continuous>  dextrose 50% Injectable 50 milliLiter(s) IV Push every 15 minutes  HYDROmorphone  Injectable 0.5 milliGRAM(s) IV Push every 4 hours PRN  insulin regular Infusion 6 Unit(s)/Hr IV Continuous <Continuous>  metolazone 10 milliGRAM(s) Oral daily  niCARdipine Infusion 2 mG/Hr IV Continuous <Continuous>  norepinephrine Infusion 0.05 MICROgram(s)/kG/Min IV Continuous <Continuous>  pantoprazole  Injectable 40 milliGRAM(s) IV Push two times a day  polyethylene glycol 3350 17 Gram(s) Oral daily  potassium chloride  10 mEq/50 mL IVPB 10 milliEquivalent(s) IV Intermittent every 1 hour  propofol Infusion 17.94 MICROgram(s)/kG/Min IV Continuous <Continuous>  senna 2 Tablet(s) Oral at bedtime  sodium chloride 0.9% lock flush 10 milliLiter(s) IV Push every 1 hour PRN  sodium chloride 0.9%. 1000 milliLiter(s) IV Continuous <Continuous>      Objective:  Vital Signs Last 24 Hrs  T(C): 37.7 (02 Dec 2022 00:00), Max: 37.7 (02 Dec 2022 00:00)  T(F): 99.9 (02 Dec 2022 00:00), Max: 99.9 (02 Dec 2022 00:00)  HR: 69 (02 Dec 2022 07:45) (63 - 80)  BP: --  BP(mean): --  RR: 22 (02 Dec 2022 07:45) (0 - 27)  SpO2: 100% (02 Dec 2022 07:45) (99% - 100%)    Parameters below as of 02 Dec 2022 00:00  Patient On (Oxygen Delivery Method): ventilator    O2 Concentration (%): 40       Eyes:INOCENCIO, EOMI  Ear/Nose/Throat: no oral lesion, no sinus tenderness on percussion	  Neck:no JVD, no lymphadenopathy, supple  Respiratory: CTA lore  Cardiovascular: S1S2 RRR, no murmurs  Gastrointestinal:soft, (+) BS, no HSM  Extremities:no e/e/c        LABS:                        8.6    13.78 )-----------( 119      ( 02 Dec 2022 00:24 )             26.1     12    148<H>  |  114<H>  |  76<H>  ----------------------------<  142<H>  3.9   |  20<L>  |  3.28<H>    Ca    8.3<L>      02 Dec 2022 00:20  Phos  4.0     12-  Mg     2.2     12-    TPro  6.1  /  Alb  3.6  /  TBili  1.1  /  DBili  x   /  AST  38  /  ALT  10  /  AlkPhos  106  12-    PT/INR - ( 02 Dec 2022 00:24 )   PT: 16.1 sec;   INR: 1.38 ratio         PTT - ( 02 Dec 2022 00:24 )  PTT:43.2 sec  Urinalysis Basic - ( 01 Dec 2022 15:51 )    Color: LIGHT BROWN / Appearance: Clear / S.012 / pH: x  Gluc: x / Ketone: Negative  / Bili: Negative / Urobili: 6 mg/dL   Blood: x / Protein: 30 mg/dL / Nitrite: Negative   Leuk Esterase: Small / RBC: 34 /hpf / WBC 6 /HPF   Sq Epi: x / Non Sq Epi: 1 /hpf / Bacteria: Negative          MICROBIOLOGY:    RECENT CULTURES:   @ 13:10 .Bronchial Bronchial Lavage       Rare polymorphonuclear leukocytes per low power field  No squamous epithelial cells per low power field  No organisms seen           No growth     @ 15:24 .Blood Blood-Peripheral                No growth to date.     @ 18:11 ET Tube ET Tube       Moderate polymorphonuclear leukocytes per low power field  No Squamous epithelial cells per low power field  No organisms seen per oil power field           No growth at 48 hours     @ 22:55 ET Tube ET Tube       No polymorphonuclear leukocytes seen per low power field  No Squamous epithelial cells seen per low power field  No organisms seen per oil power field           No growth at 48 hours     @ 22:10 .Blood Blood-Peripheral                No Growth Final     @ 21:15 .Blood Blood-Peripheral                No Growth Final     @ 10:39 .Blood Blood-Peripheral                No Growth Final     @ 10:37 .Blood Blood-Peripheral                No Growth Final          RESPIRATORY CULTURES:              RADIOLOGY & ADDITIONAL STUDIES:        Pager [phone]  After 5 pm/weekends or if no response :[phone] infectious diseases progress note:    Patient is a 62y old  Male who presents with a chief complaint of Acute cholecystitis, gallstone pancreatitis (02 Dec 2022 07:26)        Acute pancreatitis without infection or necrosis               Allergies    No Known Allergies    Intolerances        ANTIBIOTICS/RELEVANT:  antimicrobials  meropenem  IVPB 500 milliGRAM(s) IV Intermittent every 12 hours    immunologic:    OTHER:  argatroban Infusion 0.1 MICROgram(s)/kG/Min IV Continuous <Continuous>  artificial  tears Solution 1 Drop(s) Both EYES four times a day  atorvastatin 40 milliGRAM(s) Oral at bedtime  buMETAnide Injectable 2 milliGRAM(s) IV Push every 12 hours  chlorhexidine 0.12% Liquid 15 milliLiter(s) Oral Mucosa every 12 hours  chlorhexidine 2% Cloths 1 Application(s) Topical <User Schedule>  dexMEDEtomidine Infusion 1.5 MICROgram(s)/kG/Hr IV Continuous <Continuous>  dextrose 50% Injectable 50 milliLiter(s) IV Push every 15 minutes  HYDROmorphone  Injectable 0.5 milliGRAM(s) IV Push every 4 hours PRN  insulin regular Infusion 6 Unit(s)/Hr IV Continuous <Continuous>  metolazone 10 milliGRAM(s) Oral daily  niCARdipine Infusion 2 mG/Hr IV Continuous <Continuous>  norepinephrine Infusion 0.05 MICROgram(s)/kG/Min IV Continuous <Continuous>  pantoprazole  Injectable 40 milliGRAM(s) IV Push two times a day  polyethylene glycol 3350 17 Gram(s) Oral daily  potassium chloride  10 mEq/50 mL IVPB 10 milliEquivalent(s) IV Intermittent every 1 hour  propofol Infusion 17.94 MICROgram(s)/kG/Min IV Continuous <Continuous>  senna 2 Tablet(s) Oral at bedtime  sodium chloride 0.9% lock flush 10 milliLiter(s) IV Push every 1 hour PRN  sodium chloride 0.9%. 1000 milliLiter(s) IV Continuous <Continuous>      Objective:  Vital Signs Last 24 Hrs  T(C): 37.7 (02 Dec 2022 00:00), Max: 37.7 (02 Dec 2022 00:00)  T(F): 99.9 (02 Dec 2022 00:00), Max: 99.9 (02 Dec 2022 00:00)  HR: 69 (02 Dec 2022 07:45) (63 - 80)  BP: --  BP(mean): --  RR: 22 (02 Dec 2022 07:45) (0 - 27)  SpO2: 100% (02 Dec 2022 07:45) (99% - 100%)    Parameters below as of 02 Dec 2022 00:00  Patient On (Oxygen Delivery Method): ventilator    O2 Concentration (%): 40       Eyes:INOCENCIO, EOMI  Ear/Nose/Throat: no oral lesion, no sinus tenderness on percussion	  Neck:no JVD, no lymphadenopathy, supple  Respiratory: CTA lore  Cardiovascular: S1S2 RRR, no murmurs  Gastrointestinal:soft, (+) BS, no HSM  Extremities:no e/e/c        LABS:                        8.6    13.78 )-----------( 119      ( 02 Dec 2022 00:24 )             26.1     12    148<H>  |  114<H>  |  76<H>  ----------------------------<  142<H>  3.9   |  20<L>  |  3.28<H>    Ca    8.3<L>      02 Dec 2022 00:20  Phos  4.0     12-  Mg     2.2     12-    TPro  6.1  /  Alb  3.6  /  TBili  1.1  /  DBili  x   /  AST  38  /  ALT  10  /  AlkPhos  106  12-    PT/INR - ( 02 Dec 2022 00:24 )   PT: 16.1 sec;   INR: 1.38 ratio         PTT - ( 02 Dec 2022 00:24 )  PTT:43.2 sec  Urinalysis Basic - ( 01 Dec 2022 15:51 )    Color: LIGHT BROWN / Appearance: Clear / S.012 / pH: x  Gluc: x / Ketone: Negative  / Bili: Negative / Urobili: 6 mg/dL   Blood: x / Protein: 30 mg/dL / Nitrite: Negative   Leuk Esterase: Small / RBC: 34 /hpf / WBC 6 /HPF   Sq Epi: x / Non Sq Epi: 1 /hpf / Bacteria: Negative          MICROBIOLOGY:    RECENT CULTURES:   @ 13:10 .Bronchial Bronchial Lavage       Rare polymorphonuclear leukocytes per low power field  No squamous epithelial cells per low power field  No organisms seen           No growth     @ 15:24 .Blood Blood-Peripheral                No growth to date.     @ 18:11 ET Tube ET Tube       Moderate polymorphonuclear leukocytes per low power field  No Squamous epithelial cells per low power field  No organisms seen per oil power field           No growth at 48 hours     @ 22:55 ET Tube ET Tube       No polymorphonuclear leukocytes seen per low power field  No Squamous epithelial cells seen per low power field  No organisms seen per oil power field           No growth at 48 hours     @ 22:10 .Blood Blood-Peripheral                No Growth Final     @ 21:15 .Blood Blood-Peripheral                No Growth Final     @ 10:39 .Blood Blood-Peripheral                No Growth Final     @ 10:37 .Blood Blood-Peripheral                No Growth Final          RESPIRATORY CULTURES:              RADIOLOGY & ADDITIONAL STUDIES:        Pager [phone]  After 5 pm/weekends or if no response :[phone]

## 2022-12-02 NOTE — PROGRESS NOTE ADULT - SUBJECTIVE AND OBJECTIVE BOX
[facility] DIVISION OF KIDNEY DISEASES AND HYPERTENSION   FOLLOW UP NOTE    --------------------------------------------------------------------------------  HPI:  61 y/o male with PMH of CABG, DM, HTN, HLD presenting as transfer from [place] for c/f STEMI. Course c/b gallstone pancreatitis leading to ARDS and shock & cardiac arrest now on VA ECMO. Had significant troponin elevation and his LVEF down to 8%. Pt was deemed to be too unstable to have an angiogram and potential PCI due to his co-morbidities & a new subdural bleed. Nephrology called for ERIC. SCr on arrival was 2.15; trended down to hector 1.6 on 11/25; slowly trended up & peaked to 3.95 11/28.     Patient seen & examined today, family at bedside. Pt is intubated, unable to obtain ROS. Pt was receiving diuretics. SCr elevated/stable at 3.28. Pt is nonoliguric.     PAST HISTORY  --------------------------------------------------------------------------------  No significant changes to PMH, PSH, FHx, SHx, unless otherwise noted    ALLERGIES & MEDICATIONS  --------------------------------------------------------------------------------  Allergies    No Known Allergies    Intolerances      Standing Inpatient Medications  argatroban Infusion 0.1 MICROgram(s)/kG/Min IV Continuous <Continuous>  artificial  tears Solution 1 Drop(s) Both EYES four times a day  atorvastatin 40 milliGRAM(s) Oral at bedtime  chlorhexidine 0.12% Liquid 15 milliLiter(s) Oral Mucosa every 12 hours  chlorhexidine 2% Cloths 1 Application(s) Topical <User Schedule>  dexMEDEtomidine Infusion 1.5 MICROgram(s)/kG/Hr IV Continuous <Continuous>  dextrose 50% Injectable 50 milliLiter(s) IV Push every 15 minutes  insulin regular Infusion 6 Unit(s)/Hr IV Continuous <Continuous>  meropenem  IVPB 500 milliGRAM(s) IV Intermittent every 12 hours  metolazone 10 milliGRAM(s) Oral daily  niCARdipine Infusion 2 mG/Hr IV Continuous <Continuous>  norepinephrine Infusion 0.05 MICROgram(s)/kG/Min IV Continuous <Continuous>  pantoprazole  Injectable 40 milliGRAM(s) IV Push two times a day  polyethylene glycol 3350 17 Gram(s) Oral daily  propofol Infusion 17.94 MICROgram(s)/kG/Min IV Continuous <Continuous>  senna 2 Tablet(s) Oral at bedtime  sodium chloride 0.9%. 1000 milliLiter(s) IV Continuous <Continuous>    PRN Inpatient Medications  HYDROmorphone  Injectable 0.5 milliGRAM(s) IV Push every 4 hours PRN  sodium chloride 0.9% lock flush 10 milliLiter(s) IV Push every 1 hour PRN      REVIEW OF SYSTEMS  --------------------------------------------------------------------------------  Unable to obtain    VITALS/PHYSICAL EXAM  --------------------------------------------------------------------------------  T(C): 37.1 (12-02-22 @ 12:00), Max: 37.7 (12-02-22 @ 00:00)  HR: 69 (12-02-22 @ 12:00) (64 - 80)  BP: --  RR: 11 (12-02-22 @ 12:00) (0 - 22)  SpO2: 100% (12-02-22 @ 12:00) (99% - 100%)  Wt(kg): --    12-01-22 @ 07:01  -  12-02-22 @ 07:00  --------------------------------------------------------  IN: 4416.3 mL / OUT: 5405 mL / NET: -988.7 mL    12-02-22 @ 07:01  -  12-02-22 @ 12:53  --------------------------------------------------------  IN: 920.9 mL / OUT: 1115 mL / NET: -194.1 mL    Physical Exam:  	Gen: elderly male intubated  	HEENT: Anicteric  	Pulm: Fair entry, ECMO circuit+  	CV: S1S2+  	Abd: Soft, +BS  	Ext:  trace LE edema B/L  	Neuro: sedated              : Va cath+ with clear urine  	Skin: Warm and dry    LABS/STUDIES  --------------------------------------------------------------------------------              8.6    13.78 >-----------<  119      [12-02-22 @ 00:24]              26.1     148  |  114  |  76  ----------------------------<  142      [12-02-22 @ 00:20]  3.9   |  20  |  3.28        Ca     8.3     [12-02-22 @ 00:20]      Mg     2.2     [12-02-22 @ 00:20]      Phos  4.0     [12-02-22 @ 00:20]    Creatinine Trend:  SCr 3.28 [12-02 @ 00:20]  SCr 3.01 [12-01 @ 01:08]  SCr 3.02 [11-30 @ 13:10]  SCr 3.10 [11-30 @ 00:43]  SCr 3.20 [11-29 @ 18:28]    Urinalysis - [12-01-22 @ 15:51]      Color LIGHT BROWN / Appearance Clear / SG 1.012 / pH 6.0      Gluc Negative / Ketone Negative  / Bili Negative / Urobili 6 mg/dL       Blood Moderate / Protein 30 mg/dL / Leuk Est Small / Nitrite Negative      RBC 34 / WBC 6 / Hyaline 2 / Gran  / Sq Epi  / Non Sq Epi 1 / Bacteria Negative [facility] DIVISION OF KIDNEY DISEASES AND HYPERTENSION   FOLLOW UP NOTE    --------------------------------------------------------------------------------  HPI:  63 y/o male with PMH of CABG, DM, HTN, HLD presenting as transfer from [place] for c/f STEMI. Course c/b gallstone pancreatitis leading to ARDS and shock & cardiac arrest now on VA ECMO. Had significant troponin elevation and his LVEF down to 8%. Pt was deemed to be too unstable to have an angiogram and potential PCI due to his co-morbidities & a new subdural bleed. Nephrology called for ERIC. SCr on arrival was 2.15; trended down to hector 1.6 on 11/25; slowly trended up & peaked to 3.95 11/28.     Patient seen & examined today, family at bedside. Pt is intubated, unable to obtain ROS. Pt was receiving diuretics. SCr elevated/stable at 3.28. Pt is nonoliguric.     PAST HISTORY  --------------------------------------------------------------------------------  No significant changes to PMH, PSH, FHx, SHx, unless otherwise noted    ALLERGIES & MEDICATIONS  --------------------------------------------------------------------------------  Allergies    No Known Allergies    Intolerances      Standing Inpatient Medications  argatroban Infusion 0.1 MICROgram(s)/kG/Min IV Continuous <Continuous>  artificial  tears Solution 1 Drop(s) Both EYES four times a day  atorvastatin 40 milliGRAM(s) Oral at bedtime  chlorhexidine 0.12% Liquid 15 milliLiter(s) Oral Mucosa every 12 hours  chlorhexidine 2% Cloths 1 Application(s) Topical <User Schedule>  dexMEDEtomidine Infusion 1.5 MICROgram(s)/kG/Hr IV Continuous <Continuous>  dextrose 50% Injectable 50 milliLiter(s) IV Push every 15 minutes  insulin regular Infusion 6 Unit(s)/Hr IV Continuous <Continuous>  meropenem  IVPB 500 milliGRAM(s) IV Intermittent every 12 hours  metolazone 10 milliGRAM(s) Oral daily  niCARdipine Infusion 2 mG/Hr IV Continuous <Continuous>  norepinephrine Infusion 0.05 MICROgram(s)/kG/Min IV Continuous <Continuous>  pantoprazole  Injectable 40 milliGRAM(s) IV Push two times a day  polyethylene glycol 3350 17 Gram(s) Oral daily  propofol Infusion 17.94 MICROgram(s)/kG/Min IV Continuous <Continuous>  senna 2 Tablet(s) Oral at bedtime  sodium chloride 0.9%. 1000 milliLiter(s) IV Continuous <Continuous>    PRN Inpatient Medications  HYDROmorphone  Injectable 0.5 milliGRAM(s) IV Push every 4 hours PRN  sodium chloride 0.9% lock flush 10 milliLiter(s) IV Push every 1 hour PRN      REVIEW OF SYSTEMS  --------------------------------------------------------------------------------  Unable to obtain    VITALS/PHYSICAL EXAM  --------------------------------------------------------------------------------  T(C): 37.1 (12-02-22 @ 12:00), Max: 37.7 (12-02-22 @ 00:00)  HR: 69 (12-02-22 @ 12:00) (64 - 80)  BP: --  RR: 11 (12-02-22 @ 12:00) (0 - 22)  SpO2: 100% (12-02-22 @ 12:00) (99% - 100%)  Wt(kg): --    12-01-22 @ 07:01  -  12-02-22 @ 07:00  --------------------------------------------------------  IN: 4416.3 mL / OUT: 5405 mL / NET: -988.7 mL    12-02-22 @ 07:01  -  12-02-22 @ 12:53  --------------------------------------------------------  IN: 920.9 mL / OUT: 1115 mL / NET: -194.1 mL    Physical Exam:  	Gen: elderly male intubated  	HEENT: Anicteric  	Pulm: Fair entry, ECMO circuit+  	CV: S1S2+  	Abd: Soft, +BS  	Ext:  trace LE edema B/L  	Neuro: sedated              : Va cath+ with clear urine  	Skin: Warm and dry    LABS/STUDIES  --------------------------------------------------------------------------------              8.6    13.78 >-----------<  119      [12-02-22 @ 00:24]              26.1     148  |  114  |  76  ----------------------------<  142      [12-02-22 @ 00:20]  3.9   |  20  |  3.28        Ca     8.3     [12-02-22 @ 00:20]      Mg     2.2     [12-02-22 @ 00:20]      Phos  4.0     [12-02-22 @ 00:20]    Creatinine Trend:  SCr 3.28 [12-02 @ 00:20]  SCr 3.01 [12-01 @ 01:08]  SCr 3.02 [11-30 @ 13:10]  SCr 3.10 [11-30 @ 00:43]  SCr 3.20 [11-29 @ 18:28]    Urinalysis - [12-01-22 @ 15:51]      Color LIGHT BROWN / Appearance Clear / SG 1.012 / pH 6.0      Gluc Negative / Ketone Negative  / Bili Negative / Urobili 6 mg/dL       Blood Moderate / Protein 30 mg/dL / Leuk Est Small / Nitrite Negative      RBC 34 / WBC 6 / Hyaline 2 / Gran  / Sq Epi  / Non Sq Epi 1 / Bacteria Negative [facility] DIVISION OF KIDNEY DISEASES AND HYPERTENSION   FOLLOW UP NOTE    --------------------------------------------------------------------------------  HPI:  61 y/o male with PMH of CABG, DM, HTN, HLD presenting as transfer from [place] for c/f STEMI. Course c/b gallstone pancreatitis leading to ARDS and shock & cardiac arrest now on VA ECMO. Had significant troponin elevation and his LVEF down to 8%. Pt was deemed to be too unstable to have an angiogram and potential PCI due to his co-morbidities & a new subdural bleed. Nephrology called for ERIC. SCr on arrival was 2.15; trended down to hector 1.6 on 11/25; slowly trended up & peaked to 3.95 11/28.     Patient seen & examined today, family at bedside. Pt is intubated, unable to obtain ROS. Pt was receiving diuretics. SCr elevated/stable at 3.28. Pt is nonoliguric.     PAST HISTORY  --------------------------------------------------------------------------------  No significant changes to PMH, PSH, FHx, SHx, unless otherwise noted    ALLERGIES & MEDICATIONS  --------------------------------------------------------------------------------  Allergies    No Known Allergies    Intolerances      Standing Inpatient Medications  argatroban Infusion 0.1 MICROgram(s)/kG/Min IV Continuous <Continuous>  artificial  tears Solution 1 Drop(s) Both EYES four times a day  atorvastatin 40 milliGRAM(s) Oral at bedtime  chlorhexidine 0.12% Liquid 15 milliLiter(s) Oral Mucosa every 12 hours  chlorhexidine 2% Cloths 1 Application(s) Topical <User Schedule>  dexMEDEtomidine Infusion 1.5 MICROgram(s)/kG/Hr IV Continuous <Continuous>  dextrose 50% Injectable 50 milliLiter(s) IV Push every 15 minutes  insulin regular Infusion 6 Unit(s)/Hr IV Continuous <Continuous>  meropenem  IVPB 500 milliGRAM(s) IV Intermittent every 12 hours  metolazone 10 milliGRAM(s) Oral daily  niCARdipine Infusion 2 mG/Hr IV Continuous <Continuous>  norepinephrine Infusion 0.05 MICROgram(s)/kG/Min IV Continuous <Continuous>  pantoprazole  Injectable 40 milliGRAM(s) IV Push two times a day  polyethylene glycol 3350 17 Gram(s) Oral daily  propofol Infusion 17.94 MICROgram(s)/kG/Min IV Continuous <Continuous>  senna 2 Tablet(s) Oral at bedtime  sodium chloride 0.9%. 1000 milliLiter(s) IV Continuous <Continuous>    PRN Inpatient Medications  HYDROmorphone  Injectable 0.5 milliGRAM(s) IV Push every 4 hours PRN  sodium chloride 0.9% lock flush 10 milliLiter(s) IV Push every 1 hour PRN      REVIEW OF SYSTEMS  --------------------------------------------------------------------------------  Unable to obtain    VITALS/PHYSICAL EXAM  --------------------------------------------------------------------------------  T(C): 37.1 (12-02-22 @ 12:00), Max: 37.7 (12-02-22 @ 00:00)  HR: 69 (12-02-22 @ 12:00) (64 - 80)  BP: --  RR: 11 (12-02-22 @ 12:00) (0 - 22)  SpO2: 100% (12-02-22 @ 12:00) (99% - 100%)  Wt(kg): --    12-01-22 @ 07:01  -  12-02-22 @ 07:00  --------------------------------------------------------  IN: 4416.3 mL / OUT: 5405 mL / NET: -988.7 mL    12-02-22 @ 07:01  -  12-02-22 @ 12:53  --------------------------------------------------------  IN: 920.9 mL / OUT: 1115 mL / NET: -194.1 mL    Physical Exam:  	Gen: elderly male intubated  	HEENT: Anicteric  	Pulm: Fair entry, ECMO circuit+  	CV: S1S2+  	Abd: Soft, +BS  	Ext:  trace LE edema B/L  	Neuro: sedated              : Va cath+ with clear urine  	Skin: Warm and dry    LABS/STUDIES  --------------------------------------------------------------------------------              8.6    13.78 >-----------<  119      [12-02-22 @ 00:24]              26.1     148  |  114  |  76  ----------------------------<  142      [12-02-22 @ 00:20]  3.9   |  20  |  3.28        Ca     8.3     [12-02-22 @ 00:20]      Mg     2.2     [12-02-22 @ 00:20]      Phos  4.0     [12-02-22 @ 00:20]    Creatinine Trend:  SCr 3.28 [12-02 @ 00:20]  SCr 3.01 [12-01 @ 01:08]  SCr 3.02 [11-30 @ 13:10]  SCr 3.10 [11-30 @ 00:43]  SCr 3.20 [11-29 @ 18:28]    Urinalysis - [12-01-22 @ 15:51]      Color LIGHT BROWN / Appearance Clear / SG 1.012 / pH 6.0      Gluc Negative / Ketone Negative  / Bili Negative / Urobili 6 mg/dL       Blood Moderate / Protein 30 mg/dL / Leuk Est Small / Nitrite Negative      RBC 34 / WBC 6 / Hyaline 2 / Gran  / Sq Epi  / Non Sq Epi 1 / Bacteria Negative

## 2022-12-02 NOTE — PROGRESS NOTE ADULT - PROBLEM SELECTOR PLAN 1
- continue VA ECMO (LFV/RFA, no anterograde perfusion), currently flowing at 3.5L/min. favor weaning sweep as tolerates  - monitor distal pulses closely  - ECMO wean performed on 11/30 and favorable, we would favor LHC followed by decannulation to IABP +/- inotropes with/without transition to VV. constant LV loading may be worsening pulmonary edema. MDR discussion to continue ECMO until pancreatitis/ARDS improved  - eventual LHC to assess coronaries and IABP pending improvement in renal function   - cont argatroban for AC while on ECMO  - diurese to goal net negative 2L. - continue VA ECMO (LFV/RFA, no anterograde perfusion), was tolerating 2.5 L/min and now increased to 3.5 L/min per CTS. Sweep is at 2   - monitor distal pulses closely  - ECMO wean performed on 11/30 and favorable, we would favor LHC followed by decannulation to IABP +/- inotropes with/without transition to VV. constant LV loading may be worsening pulmonary edema. MDR discussion to continue ECMO until to be able to more safely perform LHC given ERIC  - eventual LHC to assess coronaries and IABP pending improvement in renal function   - cont argatroban for AC while on ECMO  - stop diuretics, keep even to positive on fluid balance

## 2022-12-02 NOTE — PROGRESS NOTE ADULT - PROBLEM SELECTOR PLAN 4
- CT abd showing acute pancreatitis  - RUQ showing sludge, no stones  - lipase > 3000 11/24, normalized to 40 and now uptrended to 900 with resuming NGT feeding, back to NPO  - conservative care  - appreciate GI recs, possible ERCP once stable  - surgery following   - on emperic meropenem  - had a low grade fever despite Abx, NGTD from 11/28, repeat cultures from yesterday NG so far

## 2022-12-02 NOTE — PROGRESS NOTE ADULT - PROBLEM SELECTOR PLAN 2
In setting of diuretic use. SNa improved to 148 today. Continue free water flushes. Pt received PO metolazone.   Monitor SNa.

## 2022-12-02 NOTE — PROGRESS NOTE ADULT - PROBLEM SELECTOR PLAN 1
Pt with non oliguric ERIC/ ATN in the setting of STEMI, cardiac arrest & cardiogenic shock  SCr on arrival was 2.15; trended down to hector 1.6 on 11/25;  slowly trended up & peaked to 3.95 11/28. SCr increased to 3.28 today. Pt with significant UOP of ~5L in last 24 hours. Pt currently off diuretics as per CTICU team. Labs reviewed. Strict I/O. Monitor labs and urine output. Avoid NSAIDs, RCA and nephrotoxins. Dose medications as per eGFR.

## 2022-12-02 NOTE — PROGRESS NOTE ADULT - ATTENDING COMMENTS
As above.    Impression:    #1.  Acute pancreatitis based on elevated pancreatic enzymes, unable to assess abdominal pain, no obvious pancreatitis on transabdominal ultrasound.    #2.  STEMI/cardiac arrest/on ECMO.    #3.  Respiratory failure on mechanical ventilator.    Recommendations:    #1.  Suspect etiology of pancreatitis is ischemia    #2.  No indication for ERCP without cholangitis or progressive biliary obstruction

## 2022-12-02 NOTE — PROGRESS NOTE ADULT - ASSESSMENT
Patient is a 61 y/o male with PMH of CAD s/p CABG, DM, HTN, HLD presenting as transfer from [place] for c/f STEMI. Pt initially presented to [facility] w/ epigastric pain, also found to have lipase 30K, elevated WBC. Since transfer, course c/b cardiac arrest s/p ROSC, now on ECMO and SDH. Pt intubated/sedated on pressors in CTICU. GI consulted due to concern for gallstone pancreatitis.    #Pancreatitis likely ischemic pancreatitis in setting of STEMI/cardiac arrest vs gallstone pancreatitis - though no evidence of biliary obstruction at this time and may have passed stone already, if previously present  #STEMI  #Cardiac arrest s/p ROSC, on ECMO. HST>10K  #SDH  #ARDS, possibly 2/2 pancreatitis  #Elevated transaminases: likely 2/2 ischemia   #Pericholecystic fluid, cholelithiasis  - US 12/1 with normal caliber bile ducts and CBD 3 mm, PD 3mm, other portions poorly visualized (pancreas); cholelithiasis with minimal thickening of GB wall 4 mm, trace pericholecystic fluid, moderate pleural effusion   Bili/ALP/AST/ALT wnl    Recommendations  - No plans for endoscopic evaluation/ERCP as there is no clear indication; liver chemistries and bili wnl and no signs of obstruction on imaging  - Ensure adequate hydration for pancreatitis with IVF  - Per radiology, consider HIDA if concerns for acute cholecystitis, if present, typically managed by surgery, though unlikely for this patient considering his condition at which point IR drainage may be considered; defer work up to primary  - Daily CBC, CMP, coags  - dc lipase lab draws, poor utility  - patient critically ill at this time with poor overall prognosis, primary team considering LHC next week   - No additional intervention from [facility] indicated at this time  - Rest of care per primary    Preliminary note until signed by Attending.    Thank you for involving us in this patient's care. Please reach out if any further questions or concerns.     Dinorah Do MD  Gastroenterology/Hepatology Fellow, PGY-VI    NON-URGENT CONSULTS:  Please email [email or web address] OR  [email or web address]  AT NIGHT AND ON WEEKENDS:  Contact on-call GI fellow via answering service ([phone]) from 5pm-8am and on weekends/holidays  MONDAY-FRIDAY 8AM-5PM:  Pager# [phone] ([facility])  GI Phone# [phone] ([facility])     Patient is a 61 y/o male with PMH of CAD s/p CABG, DM, HTN, HLD presenting as transfer from [place] for c/f STEMI. Pt [facility] w/ epigastric pain, also found to have lipase 30K, elevated WBC. Since transfer, course c/b cardiac arrest s/p ROSC, now on ECMO and SDH. Pt intubated/sedated on pressors in CTICU. GI consulted due to concern for gallstone pancreatitis.    #Pancreatitis likely ischemic pancreatitis in setting of STEMI/cardiac arrest vs gallstone pancreatitis - though no evidence of biliary obstruction at this time and may have passed stone already, if previously present  #STEMI  #Cardiac arrest s/p ROSC, on ECMO. HST>10K  #SDH  #ARDS, possibly 2/2 pancreatitis  #Elevated transaminases: likely 2/2 ischemia   #Pericholecystic fluid, cholelithiasis  - US 12/1 with normal caliber bile ducts and CBD 3 mm, PD 3mm, other portions poorly visualized (pancreas); cholelithiasis with minimal thickening of GB wall 4 mm, trace pericholecystic fluid, moderate pleural effusion   Bili/ALP/AST/ALT wnl    Recommendations  - No plans for endoscopic evaluation/ERCP as there is no clear indication; liver chemistries and bili wnl and no signs of obstruction on imaging  - Ensure adequate hydration for pancreatitis with IVF  - Per radiology, consider HIDA if concerns for acute cholecystitis, if present, typically managed by surgery, though unlikely for this patient considering his condition at which point IR drainage may be considered; defer work up to primary  - Daily CBC, CMP, coags  - dc lipase lab draws, poor utility  - patient critically ill at this time with poor overall prognosis, primary team considering LHC next week   - No additional intervention from [facility] indicated at this time  - Rest of care per primary    Preliminary note until signed by Attending.    Thank you for involving us in this patient's care. Please reach out if any further questions or concerns.     Dinorah Do MD  Gastroenterology/Hepatology Fellow, PGY-VI    NON-URGENT CONSULTS:  Please email [email or web address]  AT NIGHT AND ON WEEKENDS:  Contact on-call GI fellow via answering service ([phone]) from 5pm-8am and on weekends/holidays  MONDAY-FRIDAY 8AM-5PM:  Pager# [phone] ([facility])  GI Phone# [phone] ([facility])     Patient is a 61 y/o male with PMH of CAD s/p CABG, DM, HTN, HLD presenting as transfer from [place] for c/f STEMI. Pt initially presented to [facility] w/ epigastric pain, also found to have lipase 30K, elevated WBC. Since transfer, course c/b cardiac arrest s/p ROSC, now on ECMO and SDH. Pt intubated/sedated on pressors in CTICU. GI consulted due to concern for gallstone pancreatitis.    #Pancreatitis likely ischemic pancreatitis in setting of STEMI/cardiac arrest vs gallstone pancreatitis - though no evidence of biliary obstruction at this time and may have passed stone already, if previously present  #STEMI  #Cardiac arrest s/p ROSC, on ECMO. HST>10K  #SDH  #ARDS, possibly 2/2 pancreatitis  #Elevated transaminases: likely 2/2 ischemia   #Pericholecystic fluid, cholelithiasis  - US 12/1 with normal caliber bile ducts and CBD 3 mm, PD 3mm, other portions poorly visualized (pancreas); cholelithiasis with minimal thickening of GB wall 4 mm, trace pericholecystic fluid, moderate pleural effusion   Bili/ALP/AST/ALT wnl    Recommendations  - No plans for endoscopic evaluation/ERCP as there is no clear indication; liver chemistries and bili wnl and no signs of obstruction on imaging  - Ensure adequate hydration for pancreatitis with IVF  - Per radiology, consider HIDA if concerns for acute cholecystitis, if present, typically managed by surgery, though unlikely for this patient considering his condition at which point IR drainage may be considered; defer work up to primary  - Daily CBC, CMP, coags  - dc lipase lab draws, poor utility  - patient critically ill at this time with poor overall prognosis, primary team considering LHC next week   - No additional intervention from [facility] indicated at this time  - Rest of care per primary    Preliminary note until signed by Attending.    Thank you for involving us in this patient's care. Please reach out if any further questions or concerns.     Dinorah Do MD  Gastroenterology/Hepatology Fellow, PGY-VI    NON-URGENT CONSULTS:  Please email [email or web address]  AT NIGHT AND ON WEEKENDS:  Contact on-call GI fellow via answering service ([phone]) from 5pm-8am and on weekends/holidays  MONDAY-FRIDAY 8AM-5PM:  Pager# [phone] ([facility])  GI Phone# [phone] ([facility])

## 2022-12-02 NOTE — PROGRESS NOTE ADULT - ASSESSMENT
63 YO M with a history of CAD s/p 4v CABG ~2019 in [facility], DM2 (A1c 7.3%), and HTN who initially presented to OSH with abdominal pain and n/v and found to have pancreatitis with lipase > 3000 though also with elevated troponins. CT abdomen revealed acute pancreatitis and his initial LVEF was 40-45%. He developed MSOF with hypoxic respiratory failure requiring intubation and ERIC and went into hypoxic PEA arrest requiring ACLS and due to persistent hypoxia and hypotension on pressors after ROSC was cannulated to VA ECMO (LFV, RFA, no anterograde perfusion catheter) on 11/25. Notably CTH that day revealed small subdural hemorrhage.     His post arrest TTE on 11/26 showed a signficantly worse LVEF of 5-10% with an AV that was only minimally opening. Since then he has had improvement in his LV function (now ~35-40%) and improved pulsatility while tolerating progressive slow ECMO weans. ECMO turndown (note in chart 11/30) was reassuring for ability to decannulate to IABP/inotropes. He is awaiting LHC (IABP at same time) when renal function improving and per CTS are deferring ECMO decannulation at this time until pancreatitis/ARDS has improved.  61 YO M with a history of CAD s/p 4v CABG ~2019 in [facility], DM2 (A1c 7.3%), and HTN who initially presented to OSH with abdominal pain and n/v and found to have pancreatitis with lipase > 3000 though also with elevated troponins. CT abdomen revealed acute pancreatitis and his initial LVEF was 40-45%. He developed MSOF with hypoxic respiratory failure requiring intubation and ERIC and went into hypoxic PEA arrest requiring ACLS and due to persistent hypoxia and hypotension on pressors after ROSC was cannulated to VA ECMO (LFV, RFA, no anterograde perfusion catheter) on 11/25. Notably CTH that day revealed small subdural hemorrhage.     His post arrest TTE on 11/26 showed a signficantly worse LVEF of 5-10% with an AV that was only minimally opening. Since then he has had improvement in his LV function (now ~35-40%) and improved pulsatility while tolerating progressive slow ECMO weans. ECMO turndown (note in chart 11/30) was reassuring for ability to decannulate to IABP/inotropes. He is awaiting LHC (IABP at same time) when renal function improving and per CTS are deferring ECMO decannulation at this time until pancreatitis/ARDS has improved.  61 YO M with a history of CAD s/p 4v CABG ~2019 in [facility], DM2 (A1c 7.3%), and HTN who initially presented to OSH with abdominal pain and n/v and found to have pancreatitis with lipase > 3000 though also with elevated troponins. CT abdomen revealed acute pancreatitis and his initial LVEF was 40-45%. He developed MSOF with hypoxic respiratory failure requiring intubation and ERIC and went into hypoxic PEA arrest requiring ACLS and due to persistent hypoxia and hypotension on pressors after ROSC was cannulated to VA ECMO (LFV, RFA, no anterograde perfusion catheter) on 11/25. Notably CTH that day revealed small subdural hemorrhage.     His post arrest TTE on 11/26 showed a signficantly worse LVEF of 5-10% with an AV that was only minimally opening. Since then he has had improvement in his LV function (now ~35-40%) and improved pulsatility while tolerating progressive slow ECMO weans. ECMO turndown (note in chart 11/30) was reassuring for ability to decannulate to IABP/inotropes. He is awaiting LHC (IABP at same time) when renal function improving and per CTS are deferring ECMO decannulation at this time until pancreatitis/ARDS has improved.

## 2022-12-02 NOTE — PROGRESS NOTE ADULT - ATTENDING COMMENTS
ERIC ATN cardiogenic shock ECMO  Diuresing well with loop and thiazide  On hold currently but can restart to keep negative  Dw daughter -physician in [facility]    Melissa Mercer MD  Off: [phone]  contact me on teams    (After 5 pm or on weekends please page the on-call fellow/attending, can check [email or web address] for schedule. Login is carmen cantor, schedule under [facility] medicine, psych, derm) ERIC ATN cardiogenic shock ECMO  Diuresing well with loop and thiazide  On hold currently but can restart to keep negative  Dw daughter -physician in [facility]    Melissa Mercer MD  Off: [phone]  contact me on teams    (After 5 pm or on weekends please page the on-call fellow/attending, can check [email or web address] for schedule. Login is carmen cantor, schedule under [facility] medicine, psych, derm) ERIC ATN cardiogenic shock ECMO  Diuresing well with loop and thiazide  On hold currently but can restart to keep negative  Dw daughter -physician in [facility]    Melissa Mercer MD  Off: [phone]  contact me on teams    (After 5 pm or on weekends please page the on-call fellow/attending, can check [email or web address] for schedule. Login is carmen cantor, schedule under [facility] medicine, psych, derm)

## 2022-12-02 NOTE — PROGRESS NOTE ADULT - PROBLEM SELECTOR PLAN 3
- CXR improved with more lung volumes after bronch and increased PEEP  - bronch showed erythematous airways with scant bleeding  - plan to keep net even today  - c/w ECMO , adjust sweep and FdO2 according to ABG  - extubate when able  - continue checking right radial ABG.  - continue treatment for pancreatitis related ARDS.

## 2022-12-02 NOTE — PROGRESS NOTE ADULT - SUBJECTIVE AND OBJECTIVE BOX
Patient seen and examined at the bedside.    Remained critically ill on continuous ICU monitoring.    OBJECTIVE:  ICU Vital Signs Last 24 Hrs  T(C): 37.1 (02 Dec 2022 20:00), Max: 37.7 (02 Dec 2022 00:00)  T(F): 98.8 (02 Dec 2022 20:00), Max: 99.9 (02 Dec 2022 00:00)  HR: 66 (02 Dec 2022 22:30) (64 - 80)  BP: --  BP(mean): --  ABP: 124/55 (02 Dec 2022 22:30) (91/71 - 155/64)  ABP(mean): 70 (02 Dec 2022 22:30) (61 - 93)  RR: 14 (02 Dec 2022 22:30) (0 - 37)  SpO2: 100% (02 Dec 2022 22:30) (100% - 100%)    O2 Parameters below as of 02 Dec 2022 20:00  Patient On (Oxygen Delivery Method): ventilator    O2 Concentration (%): 40      Physical Exam:   General: Intubated, multiple lines gtt  Neurology: sedated   Eyes: bilateral pupils equal and reactive   ENT/Neck: +ETT midline, Neck supple, trachea midline, No JVD   Respiratory: rhonchi bilaterally   CV: S1S2, no murmurs        [x] Sinus rhythm  Abdominal: Soft, NT, ND +BS   Extremities: 1-2+ pedal edema noted, + peripheral pulses   Skin: No Rashes, Hematoma, Ecchymosis                              Assessment:  61 y/o male with PMH of CABG, DM, HTN, HLD presenting as transfer from [place] for c/f STEMI. PTA arrival received 325 aspirin, 600 plavix, and no heparin drip started. Around 1:30 am patient had multiple episodes of non-bloody diarrhea and emesis with associated abdominal pain and chest pain, unable to localize, non-radiating, with associated SOB and no associated palpitations or diaphoresis. No recent fevers/chills, cough, rashes, or changes in urination. Does report mild diffuse back pain; started 5 days ago in setting on injury while walking and lifting objects. Denies focal weakness, numbness/tingling, or LOC due does report mild dizziness.    Cardiac arrest s/p VA ECMO 11/25  Hemodynamic instability  Hypovolemia  Post op respiratory insufficiency  Thrombocytopenia  Acute blood loss anemia  Leukocytosis       Plan:   ***Neuro***  CT head showed 4mm subdural hematoma 11/26: repeat CT head unchanged   [x] Sedated with [x] Precedex [x] Propofol  neuro assessment s  Dilaudid for pain    ***Cardiovascular***  11/30 TTE: EF 30-35%, Severe global LV dysfunction  Invasive hemodynamic monitoring, assess perfusion indices   SR 65 / CVP 7/ MAP 71/ PA 26/11/ Hct 26 / Lactate 0.6  [x] levophed .01, wean as tolerated for maps >65  [x] ECMO- 3600 rpm, 3.8 flow, sweep 2/100%  Reassessment of hemodynamics post resuscitation   [x] Statin  [x] AC Therapy with Argatroban gtt  Stress dose steroids weaned off  plan for eventual cath once stabilize and BUN/creat improve    ***Pulmonary***  [x]  Nitric oxide- 20 ppm   Post op vent management   Titration of FiO2 and PEEP, follow SpO2, CXR, blood gasses   100% FiO2 on ECMO    Mode: AC/ CMV (Assist Control/ Continuous Mandatory Ventilation)  RR (machine): 14  FiO2: 40  PEEP: 12  ITime: 1  MAP: 16  PC: 10  PIP: 23      ***GI***  [x] NPO given rise in amylase/lipase and worsening abd exam while on tube feeds  [x] Protonix    Bowel regimen with Miralax and senna   GI following      ***Renal***  Continue to monitor I/Os, BUN/Creatinine.   Replete lytes PRN  De Dios present  Fluid goal even to slightly positive  C/w Free water 300 q4 for hypernatremia      ***ID***  Meropenem for empiric coverage  Follow up cultures    ***Endocrine***  [x]  DM2: HbA1c 7.3%                - [x] Insulin gtt              - Need tight glycemic control to prevent infection.      Patient requires continuous monitoring with bedside rhythm monitoring, pulse oximetry monitoring, and continuous central venous and arterial pressure monitoring; and intermittent blood gas analysis. Care plan discussed with the ICU care team.   Patient remained critical, at risk for life threatening decompensation.    I have spent 30 minutes providing critical care management to this patient.   Patient seen and examined at the bedside.    Remained critically ill on continuous ICU monitoring.    OBJECTIVE:  ICU Vital Signs Last 24 Hrs  T(C): 37.1 (02 Dec 2022 20:00), Max: 37.7 (02 Dec 2022 00:00)  T(F): 98.8 (02 Dec 2022 20:00), Max: 99.9 (02 Dec 2022 00:00)  HR: 66 (02 Dec 2022 22:30) (64 - 80)  BP: --  BP(mean): --  ABP: 124/55 (02 Dec 2022 22:30) (91/71 - 155/64)  ABP(mean): 70 (02 Dec 2022 22:30) (61 - 93)  RR: 14 (02 Dec 2022 22:30) (0 - 37)  SpO2: 100% (02 Dec 2022 22:30) (100% - 100%)    O2 Parameters below as of 02 Dec 2022 20:00  Patient On (Oxygen Delivery Method): ventilator    O2 Concentration (%): 40      Physical Exam:   General: Intubated, multiple lines gtt  Neurology: sedated   Eyes: bilateral pupils equal and reactive   ENT/Neck: +ETT midline, Neck supple, trachea midline, No JVD   Respiratory: rhonchi bilaterally   CV: S1S2, no murmurs        [x] Sinus rhythm  Abdominal: Soft, NT, ND +BS   Extremities: 1-2+ pedal edema noted, + peripheral pulses   Skin: No Rashes, Hematoma, Ecchymosis                              Assessment:  61 y/o male with PMH of CABG, DM, HTN, HLD presenting as transfer from [place] for c/f STEMI. PTA arrival received 325 aspirin, 600 plavix, and no heparin drip started. Around 1:30 am patient had multiple episodes of non-bloody diarrhea and emesis with associated abdominal pain and chest pain, unable to localize, non-radiating, with associated SOB and no associated palpitations or diaphoresis. No recent fevers/chills, cough, rashes, or changes in urination. Does report mild diffuse back pain; started 5 days ago in setting on injury while walking and lifting objects. Denies focal weakness, numbness/tingling, or LOC due does report mild dizziness.    Cardiac arrest s/p VA ECMO 11/25  Hemodynamic instability  Hypovolemia  Post op respiratory insufficiency  Thrombocytopenia  Acute blood loss anemia  Leukocytosis       Plan:   ***Neuro***  CT head showed 4mm subdural hematoma 11/26: repeat CT head unchanged   [x] Sedated with [x] Precedex [x] Propofol  neuro assessment s  Dilaudid for pain    ***Cardiovascular***  11/30 TTE: EF 30-35%, Severe global LV dysfunction  Invasive hemodynamic monitoring, assess perfusion indices   SR 65 / CVP 7/ MAP 71/ PA 26/11/ Hct 26 / Lactate 0.6  [x] levophed .01, wean as tolerated for maps >65  [x] ECMO- 3600 rpm, 3.8 flow, sweep 2/100%  Reassessment of hemodynamics post resuscitation   [x] Statin  [x] AC Therapy with Argatroban gtt  Stress dose steroids weaned off  plan for eventual cath once stabilize and BUN/creat improve    ***Pulmonary***  [x]  Nitric oxide- 20 ppm   Post op vent management   Titration of FiO2 and PEEP, follow SpO2, CXR, blood gasses   100% FiO2 on ECMO    Mode: AC/ CMV (Assist Control/ Continuous Mandatory Ventilation)  RR (machine): 14  FiO2: 40  PEEP: 12  ITime: 1  MAP: 16  PC: 10  PIP: 23      ***GI***  [x] NPO given rise in amylase/lipase and worsening abd exam while on tube feeds  [x] Protonix    Bowel regimen with Miralax and senna   GI following      ***Renal***  Continue to monitor I/Os, BUN/Creatinine.   Replete lytes PRN  De Dios present  Fluid goal even to slightly positive  C/w Free water 300 q4 for hypernatremia      ***ID***  Meropenem for empiric coverage  Follow up cultures    ***Endocrine***  [x]  DM2: HbA1c 7.3%                - [x] Insulin gtt              - Need tight glycemic control to prevent infection.      Patient requires continuous monitoring with bedside rhythm monitoring, pulse oximetry monitoring, and continuous central venous and arterial pressure monitoring; and intermittent blood gas analysis. Care plan discussed with the ICU care team.   Patient remained critical, at risk for life threatening decompensation.    I have spent 30 minutes providing critical care management to this patient.   Patient seen and examined at the bedside.    Remained critically ill on continuous ICU monitoring.    OBJECTIVE:  ICU Vital Signs Last 24 Hrs  T(C): 37.1 (02 Dec 2022 20:00), Max: 37.7 (02 Dec 2022 00:00)  T(F): 98.8 (02 Dec 2022 20:00), Max: 99.9 (02 Dec 2022 00:00)  HR: 66 (02 Dec 2022 22:30) (64 - 80)  BP: --  BP(mean): --  ABP: 124/55 (02 Dec 2022 22:30) (91/71 - 155/64)  ABP(mean): 70 (02 Dec 2022 22:30) (61 - 93)  RR: 14 (02 Dec 2022 22:30) (0 - 37)  SpO2: 100% (02 Dec 2022 22:30) (100% - 100%)    O2 Parameters below as of 02 Dec 2022 20:00  Patient On (Oxygen Delivery Method): ventilator    O2 Concentration (%): 40      Physical Exam:   General: Intubated, multiple lines gtt  Neurology: sedated   Eyes: bilateral pupils equal and reactive   ENT/Neck: +ETT midline, Neck supple, trachea midline, No JVD   Respiratory: rhonchi bilaterally   CV: S1S2, no murmurs        [x] Sinus rhythm  Abdominal: Soft, NT, ND +BS   Extremities: 1-2+ pedal edema noted, + peripheral pulses   Skin: No Rashes, Hematoma, Ecchymosis                              Assessment:  61 y/o male with PMH of CABG, DM, HTN, HLD presenting as transfer from [place] for c/f STEMI. PTA arrival received 325 aspirin, 600 plavix, and no heparin drip started. Around 1:30 am patient had multiple episodes of non-bloody diarrhea and emesis with associated abdominal pain and chest pain, unable to localize, non-radiating, with associated SOB and no associated palpitations or diaphoresis. No recent fevers/chills, cough, rashes, or changes in urination. Does report mild diffuse back pain; started 5 days ago in setting on injury while walking and lifting objects. Denies focal weakness, numbness/tingling, or LOC due does report mild dizziness.    Cardiac arrest s/p VA ECMO 11/25  Hemodynamic instability  Hypovolemia  Post op respiratory insufficiency  Thrombocytopenia  Acute blood loss anemia  Leukocytosis       Plan:   ***Neuro***  CT head showed 4mm subdural hematoma 11/26: repeat CT head unchanged   [x] Sedated with [x] Precedex [x] Propofol  neuro assessment s  Dilaudid for pain    ***Cardiovascular***  11/30 TTE: EF 30-35%, Severe global LV dysfunction  Invasive hemodynamic monitoring, assess perfusion indices   SR 65 / CVP 7/ MAP 71/ PA 26/11/ Hct 26 / Lactate 0.6  [x] levophed .01, wean as tolerated for maps >65  [x] ECMO- 3600 rpm, 3.8 flow, sweep 2/100%  Reassessment of hemodynamics post resuscitation   [x] Statin  [x] AC Therapy with Argatroban gtt  Stress dose steroids weaned off  plan for eventual cath once stabilize and BUN/creat improve    ***Pulmonary***  [x]  Nitric oxide- 20 ppm   Post op vent management   Titration of FiO2 and PEEP, follow SpO2, CXR, blood gasses   100% FiO2 on ECMO    Mode: AC/ CMV (Assist Control/ Continuous Mandatory Ventilation)  RR (machine): 14  FiO2: 40  PEEP: 12  ITime: 1  MAP: 16  PC: 10  PIP: 23      ***GI***  [x] NPO given rise in amylase/lipase and worsening abd exam while on tube feeds  [x] Protonix    Bowel regimen with Miralax and senna   GI following      ***Renal***  Continue to monitor I/Os, BUN/Creatinine.   Replete lytes PRN  De Dios present  Fluid goal even to slightly positive  C/w Free water 300 q4 for hypernatremia      ***ID***  Meropenem for empiric coverage  Follow up cultures    ***Endocrine***  [x]  DM2: HbA1c 7.3%                - [x] Insulin gtt              - Need tight glycemic control to prevent infection.      Patient requires continuous monitoring with bedside rhythm monitoring, pulse oximetry monitoring, and continuous central venous and arterial pressure monitoring; and intermittent blood gas analysis. Care plan discussed with the ICU care team.   Patient remained critical, at risk for life threatening decompensation.    I have spent 30 minutes providing critical care management to this patient.

## 2022-12-02 NOTE — PROGRESS NOTE ADULT - PROBLEM SELECTOR PLAN 5
- likely arrest associated ATN   - UOP is good, urinated 5.4L over last 24hr, net negative 1.5L, same as yesterday  - Cr 3.28 from 3 yesterday  - no indication for dialysis  - renal following. - likely arrest associated ATN and now worsened from hypovolemia   - stop diuretics and aim to keep net even   - no indication for dialysis  - renal following.

## 2022-12-02 NOTE — PROGRESS NOTE ADULT - SUBJECTIVE AND OBJECTIVE BOX
Subjective:    no acute overnight events  received 1 U Plt  overnight  bronch yesterday showing erythematous airways , PEEP increased and CXR with more volume this morning  was on cardene which was off once propofol started and subsequently required vasopressin to maintain MAP >65    Medications:  argatroban Infusion 0.1 MICROgram(s)/kG/Min IV Continuous <Continuous>  artificial  tears Solution 1 Drop(s) Both EYES four times a day  atorvastatin 40 milliGRAM(s) Oral at bedtime  chlorhexidine 0.12% Liquid 15 milliLiter(s) Oral Mucosa every 12 hours  chlorhexidine 2% Cloths 1 Application(s) Topical <User Schedule>  dexMEDEtomidine Infusion 1.5 MICROgram(s)/kG/Hr IV Continuous <Continuous>  dextrose 50% Injectable 50 milliLiter(s) IV Push every 15 minutes  HYDROmorphone  Injectable 0.5 milliGRAM(s) IV Push every 4 hours PRN  insulin regular Infusion 6 Unit(s)/Hr IV Continuous <Continuous>  meropenem  IVPB 500 milliGRAM(s) IV Intermittent every 12 hours  metolazone 10 milliGRAM(s) Oral daily  niCARdipine Infusion 2 mG/Hr IV Continuous <Continuous>  norepinephrine Infusion 0.05 MICROgram(s)/kG/Min IV Continuous <Continuous>  pantoprazole  Injectable 40 milliGRAM(s) IV Push two times a day  polyethylene glycol 3350 17 Gram(s) Oral daily  propofol Infusion 17.94 MICROgram(s)/kG/Min IV Continuous <Continuous>  senna 2 Tablet(s) Oral at bedtime  sodium chloride 0.9% lock flush 10 milliLiter(s) IV Push every 1 hour PRN  sodium chloride 0.9%. 1000 milliLiter(s) IV Continuous <Continuous>      Physical Exam:    Vitals:  Vital Signs Last 24 Hours  T(C): 37.1 (12-02-22 @ 12:00), Max: 37.7 (12-02-22 @ 00:00)  HR: 68 (12-02-22 @ 13:00) (64 - 80)  BP: MAP 65-70  RR: 10 (12-02-22 @ 13:00) (0 - 22)  SpO2: 100% (12-02-22 @ 13:00) (99% - 100%)        I&O's Summary    01 Dec 2022 07:01  -  02 Dec 2022 07:00  --------------------------------------------------------  IN: 4416.3 mL / OUT: 5405 mL / NET: -988.7 mL    02 Dec 2022 07:01  -  02 Dec 2022 13:36  --------------------------------------------------------  IN: 1000.4 mL / OUT: 1265 mL / NET: -264.6 mL        Tele:    RASS -3, intubated PEEP 12 FiO2 50%  Neck supple, RIJ in place  Heart RRR s1s2 no m   Lungs clear, mechanical bs  Ext warm trace dependent edema    Labs:                        8.6    13.78 )-----------( 119      ( 02 Dec 2022 00:24 )             26.1     12-02    148<H>  |  114<H>  |  76<H>  ----------------------------<  142<H>  3.9   |  20<L>  |  3.28<H>    Ca    8.3<L>      02 Dec 2022 00:20  Phos  4.0     12-02  Mg     2.2     12-02    TPro  6.1  /  Alb  3.6  /  TBili  1.1  /  DBili  x   /  AST  38  /  ALT  10  /  AlkPhos  106  12-02    PT/INR - ( 02 Dec 2022 00:24 )   PT: 16.1 sec;   INR: 1.38 ratio         PTT - ( 02 Dec 2022 00:24 )  PTT:43.2 sec        Oxygen Saturation, Mixed: 69.0 (12-02 @ 03:50)  Oxygen Saturation, Mixed: 67.4 (12-02 @ 00:00)  Oxygen Saturation, Mixed: 72.3 (12-01 @ 01:00)    Lactate Dehydrogenase, Serum: 462 U/L (12-02 @ 00:20)  Lactate Dehydrogenase, Serum: 494 U/L (12-01 @ 01:08)  Lactate Dehydrogenase, Serum: 527 U/L (11-30 @ 00:43)

## 2022-12-02 NOTE — PROGRESS NOTE ADULT - ASSESSMENT
62 year old admitted with pancreatitis  not necrotizing on the CT  scan and cholecystitis .  pt has sent to the SICU but developed respiratory  failure presumed to be due to ARDS also with hs of CAD<CABG<DM and possible MI  Movedto the CTU for echo      Fever  not on CAVHD  cultures are negative and ct looks like ARDS.  no fever    legs are viable    ARDS  likely all problems related to pancreatitis .  agree with short 7 8 day course of meropenem despite the lack of necrosis on his ct scan   to complete zaina on sunday   reculture prn        CHF  on ecmo  weaning per cardiac team

## 2022-12-02 NOTE — PROGRESS NOTE ADULT - SUBJECTIVE AND OBJECTIVE BOX
CRITICAL CARE ATTENDING - CTICU    MEDICATIONS  (STANDING):  argatroban Infusion 0.1 MICROgram(s)/kG/Min (0.56 mL/Hr) IV Continuous <Continuous>  artificial  tears Solution 1 Drop(s) Both EYES four times a day  atorvastatin 40 milliGRAM(s) Oral at bedtime  buMETAnide Injectable 2 milliGRAM(s) IV Push every 12 hours  chlorhexidine 0.12% Liquid 15 milliLiter(s) Oral Mucosa every 12 hours  chlorhexidine 2% Cloths 1 Application(s) Topical <User Schedule>  dexMEDEtomidine Infusion 1.5 MICROgram(s)/kG/Hr (34.8 mL/Hr) IV Continuous <Continuous>  dextrose 50% Injectable 50 milliLiter(s) IV Push every 15 minutes  insulin regular Infusion 6 Unit(s)/Hr (6 mL/Hr) IV Continuous <Continuous>  meropenem  IVPB 500 milliGRAM(s) IV Intermittent every 12 hours  metolazone 10 milliGRAM(s) Oral daily  niCARdipine Infusion 2 mG/Hr (10 mL/Hr) IV Continuous <Continuous>  norepinephrine Infusion 0.05 MICROgram(s)/kG/Min (8.71 mL/Hr) IV Continuous <Continuous>  pantoprazole  Injectable 40 milliGRAM(s) IV Push two times a day  polyethylene glycol 3350 17 Gram(s) Oral daily  potassium chloride  10 mEq/50 mL IVPB 10 milliEquivalent(s) IV Intermittent every 1 hour  propofol Infusion 17.94 MICROgram(s)/kG/Min (10 mL/Hr) IV Continuous <Continuous>  senna 2 Tablet(s) Oral at bedtime  sodium chloride 0.9% Bolus 500 milliLiter(s) IV Bolus once  sodium chloride 0.9%. 1000 milliLiter(s) (5 mL/Hr) IV Continuous <Continuous>                                    8.6    13.78 )-----------( 119      ( 02 Dec 2022 00:24 )             26.1       12-02    148<H>  |  114<H>  |  76<H>  ----------------------------<  142<H>  3.9   |  20<L>  |  3.28<H>    Ca    8.3<L>      02 Dec 2022 00:20  Phos  4.0     12-02  Mg     2.2     12-02    TPro  6.1  /  Alb  3.6  /  TBili  1.1  /  DBili  x   /  AST  38  /  ALT  10  /  AlkPhos  106  12-02      PT/INR - ( 02 Dec 2022 00:24 )   PT: 16.1 sec;   INR: 1.38 ratio         PTT - ( 02 Dec 2022 00:24 )  PTT:43.2 sec    Mode: AC/ CMV (Assist Control/ Continuous Mandatory Ventilation)  RR (machine): 14  FiO2: 50  PEEP: 12  ITime: 1  MAP: 16  PC: 10  PIP: 22      Daily     Daily       12-01 @ 07:01  -  12-02 @ 07:00  --------------------------------------------------------  IN: 3397.4 mL / OUT: 5055 mL / NET: -1657.6 mL        Critically Ill patient  : [ ] preoperative ,   [x] post operative    Requires :  [x] Arterial Line   [x] Central Line  [ ] PA catheter  [ ] IABP  [x] ECMO - VA  [ ] LVAD  [x] Ventilator  [ ] pacemaker [ ] Impella [x] Ophelia                      [x ] ABG's     [ x] Pulse Oxymetry Monitoring  Bedside evaluation , monitoring , treatment of hemodynamics , fluids , IVP/ IVCD meds.        Diagnosis:     POD 7- [facility] ECMO s/p arrest    Hypovolemia    Respiratory Failure     ARDS    Ventilator Management:  [x]AC-rest    [ ]CPAP-PS Wean    [ ]Trach Collar     [ ]Extubate    [ ] T-Piece  [x]peep>5     +14    Respiratory status required full ventilatory support, close monitoring of respiratory rate and breathing pattern, the following of ABG’s with A-line monitoring, continuous pulse oximetry monitoring     Requires chest PT, pulmonary toilet, ambu bagging, suctioning to maintain SaO2,  patent airway and treat atelectasis.     IVCD Precedex and Propofol for vent synchrony    Hemodynamic lability,  instability. Requires IVCD [x] vasopressors [] inotropes  [x] vasodilator- currently off  [x]IVSS fluid  to maintain MAP, perfusion, C.I.      Hypertension    IVCD Cardene    CHF- acute [ x]   chronic [  ]    systolic [ x]   diatolic [ ]          - Echo- EF -   30-35%, Severe global LV dysfunction      [ ] RV dysfunction          - Cxr-cardiomegally, edema          - Clinical-  [ ]inotropes   [x] pressors  [x]diuresis   [ ]IABP   [x]ECMO   [ ]LVAD   [x ]Respiratory Failure.     IVCD anticoagulation with [ ] Heparin  [ x] Argatroban for ECMO circuit     [facility] ECMO circuit    Renal Failure - Acute Kidney Injury      Fluid Overload     Hypernatremia    Ophelia 20 ppm    pancreatitis     Cardiogenic Shock - EF ? 8   ?    Thrombocytopenia    DM- IVCD Insulin    Requires bedside physical therapy, mobilization and total [facility].     ? ERCP ? Cardiac  Cath ?          By signing my name below, I, Maria D'Amico, attest that this documentation has been prepared under the direction and in the presence of Finn Zelaya MD.   Electronically Signed: Maria D'Amico, Scribe 12-02-22 @ 07:26      Discussed with CT surgeon, Physician Assistant - Nurse Practitioner- Critical care medicine team.   Discussed at  AM / PM rounds.   Chart, labs , films reviewed.    Cumulative Critical Care Time Given Today:  CRITICAL CARE ATTENDING - [facility]    MEDICATIONS  (STANDING):  argatroban Infusion 0.1 MICROgram(s)/kG/Min (0.56 mL/Hr) IV Continuous <Continuous>  artificial  tears Solution 1 Drop(s) Both EYES four times a day  atorvastatin 40 milliGRAM(s) Oral at bedtime  buMETAnide Injectable 2 milliGRAM(s) IV Push every 12 hours  chlorhexidine 0.12% Liquid 15 milliLiter(s) Oral Mucosa every 12 hours  chlorhexidine 2% Cloths 1 Application(s) Topical <User Schedule>  dexMEDEtomidine Infusion 1.5 MICROgram(s)/kG/Hr (34.8 mL/Hr) IV Continuous <Continuous>  dextrose 50% Injectable 50 milliLiter(s) IV Push every 15 minutes  insulin regular Infusion 6 Unit(s)/Hr (6 mL/Hr) IV Continuous <Continuous>  meropenem  IVPB 500 milliGRAM(s) IV Intermittent every 12 hours  metolazone 10 milliGRAM(s) Oral daily  niCARdipine Infusion 2 mG/Hr (10 mL/Hr) IV Continuous <Continuous>  norepinephrine Infusion 0.05 MICROgram(s)/kG/Min (8.71 mL/Hr) IV Continuous <Continuous>  pantoprazole  Injectable 40 milliGRAM(s) IV Push two times a day  polyethylene glycol 3350 17 Gram(s) Oral daily  potassium chloride  10 mEq/50 mL IVPB 10 milliEquivalent(s) IV Intermittent every 1 hour  propofol Infusion 17.94 MICROgram(s)/kG/Min (10 mL/Hr) IV Continuous <Continuous>  senna 2 Tablet(s) Oral at bedtime  sodium chloride 0.9% Bolus 500 milliLiter(s) IV Bolus once  sodium chloride 0.9%. 1000 milliLiter(s) (5 mL/Hr) IV Continuous <Continuous>                                    8.6    13.78 )-----------( 119      ( 02 Dec 2022 00:24 )             26.1       12-02    148<H>  |  114<H>  |  76<H>  ----------------------------<  142<H>  3.9   |  20<L>  |  3.28<H>    Ca    8.3<L>      02 Dec 2022 00:20  Phos  4.0     12-02  Mg     2.2     12-02    TPro  6.1  /  Alb  3.6  /  TBili  1.1  /  DBili  x   /  AST  38  /  ALT  10  /  AlkPhos  106  12-02      PT/INR - ( 02 Dec 2022 00:24 )   PT: 16.1 sec;   INR: 1.38 ratio         PTT - ( 02 Dec 2022 00:24 )  PTT:43.2 sec    Mode: AC/ CMV (Assist Control/ Continuous Mandatory Ventilation)  RR (machine): 14  FiO2: 50  PEEP: 12  ITime: 1  MAP: 16  PC: 10  PIP: 22      Daily     Daily       12-01 @ 07:01  -  12-02 @ 07:00  --------------------------------------------------------  IN: 3397.4 mL / OUT: 5055 mL / NET: -1657.6 mL        Critically Ill patient  : [ ] preoperative ,   [x] post operative    Requires :  [x] Arterial Line   [x] Central Line  [ ] PA catheter  [ ] IABP  [x] ECMO - VA  [ ] LVAD  [x] Ventilator  [ ] pacemaker [ ] Impella [x] Ophelia                      [x ] ABG's     [ x] Pulse Oxymetry Monitoring  Bedside evaluation , monitoring , treatment of hemodynamics , fluids , IVP/ IVCD meds.        Diagnosis:     POD 7- [facility] ECMO s/p arrest    Hypovolemia    Respiratory Failure     ARDS    Ventilator Management:  [x]AC-rest    [ ]CPAP-PS Wean    [ ]Trach Collar     [ ]Extubate    [ ] T-Piece  [x]peep>5     +14    Respiratory status required full ventilatory support, close monitoring of respiratory rate and breathing pattern, the following of ABG’s with A-line monitoring, continuous pulse oximetry monitoring     Requires chest PT, pulmonary toilet, ambu bagging, suctioning to maintain SaO2,  patent airway and treat atelectasis.     IVCD Precedex and Propofol for vent synchrony    Hemodynamic lability,  instability. Requires IVCD [x] vasopressors [] inotropes  [x] vasodilator- currently off  [x]IVSS fluid  to maintain MAP, perfusion, C.I.      Hypertension    IVCD Cardene    CHF- acute [ x]   chronic [  ]    systolic [ x]   diatolic [ ]          - Echo- EF -   30-35%, Severe global LV dysfunction      [ ] RV dysfunction          - Cxr-cardiomegally, edema          - Clinical-  [ ]inotropes   [x] pressors  [x]diuresis   [ ]IABP   [x]ECMO   [ ]LVAD   [x ]Respiratory Failure.     IVCD anticoagulation with [ ] Heparin  [ x] Argatroban for ECMO circuit     [facility] ECMO circuit    Renal Failure - Acute Kidney Injury      Fluid Overload     Hypernatremia    Ophelia 20 ppm    pancreatitis     Cardiogenic Shock - EF ? 8   ?    Thrombocytopenia    DM- IVCD Insulin    Requires bedside physical therapy, mobilization and total [facility] care.     ? ERCP ? Cardiac  Cath ?          By signing my name below, I, Maria D'Amico, attest that this documentation has been prepared under the direction and in the presence of Finn Zelaya MD.   Electronically Signed: Maria D'Amico, Scribe 12-02-22 @ 07:26      Discussed with CT surgeon, Physician Assistant - Nurse Practitioner- Critical care medicine team.   Discussed at  AM / PM rounds.   Chart, labs , films reviewed.    Cumulative Critical Care Time Given Today:  CRITICAL CARE ATTENDING - [facility]    MEDICATIONS  (STANDING):  argatroban Infusion 0.1 MICROgram(s)/kG/Min (0.56 mL/Hr) IV Continuous <Continuous>  artificial  tears Solution 1 Drop(s) Both EYES four times a day  atorvastatin 40 milliGRAM(s) Oral at bedtime  buMETAnide Injectable 2 milliGRAM(s) IV Push every 12 hours  chlorhexidine 0.12% Liquid 15 milliLiter(s) Oral Mucosa every 12 hours  chlorhexidine 2% Cloths 1 Application(s) Topical <User Schedule>  dexMEDEtomidine Infusion 1.5 MICROgram(s)/kG/Hr (34.8 mL/Hr) IV Continuous <Continuous>  dextrose 50% Injectable 50 milliLiter(s) IV Push every 15 minutes  insulin regular Infusion 6 Unit(s)/Hr (6 mL/Hr) IV Continuous <Continuous>  meropenem  IVPB 500 milliGRAM(s) IV Intermittent every 12 hours  metolazone 10 milliGRAM(s) Oral daily  niCARdipine Infusion 2 mG/Hr (10 mL/Hr) IV Continuous <Continuous>  norepinephrine Infusion 0.05 MICROgram(s)/kG/Min (8.71 mL/Hr) IV Continuous <Continuous>  pantoprazole  Injectable 40 milliGRAM(s) IV Push two times a day  polyethylene glycol 3350 17 Gram(s) Oral daily  potassium chloride  10 mEq/50 mL IVPB 10 milliEquivalent(s) IV Intermittent every 1 hour  propofol Infusion 17.94 MICROgram(s)/kG/Min (10 mL/Hr) IV Continuous <Continuous>  senna 2 Tablet(s) Oral at bedtime  sodium chloride 0.9% Bolus 500 milliLiter(s) IV Bolus once  sodium chloride 0.9%. 1000 milliLiter(s) (5 mL/Hr) IV Continuous <Continuous>                                    8.6    13.78 )-----------( 119      ( 02 Dec 2022 00:24 )             26.1       12-02    148<H>  |  114<H>  |  76<H>  ----------------------------<  142<H>  3.9   |  20<L>  |  3.28<H>    Ca    8.3<L>      02 Dec 2022 00:20  Phos  4.0     12-02  Mg     2.2     12-02    TPro  6.1  /  Alb  3.6  /  TBili  1.1  /  DBili  x   /  AST  38  /  ALT  10  /  AlkPhos  106  12-02      PT/INR - ( 02 Dec 2022 00:24 )   PT: 16.1 sec;   INR: 1.38 ratio         PTT - ( 02 Dec 2022 00:24 )  PTT:43.2 sec    Mode: AC/ CMV (Assist Control/ Continuous Mandatory Ventilation)  RR (machine): 14  FiO2: 50  PEEP: 12  ITime: 1  MAP: 16  PC: 10  PIP: 22      Daily     Daily       12-01 @ 07:01  -  12-02 @ 07:00  --------------------------------------------------------  IN: 3397.4 mL / OUT: 5055 mL / NET: -1657.6 mL        Critically Ill patient  : [ ] preoperative ,   [x] post operative    Requires :  [x] Arterial Line   [x] Central Line  [ ] PA catheter  [ ] IABP  [x] ECMO - VA  [ ] LVAD  [x] Ventilator  [ ] pacemaker [ ] Impella [x] Ophelia                      [x ] ABG's     [ x] Pulse Oxymetry Monitoring  Bedside evaluation , monitoring , treatment of hemodynamics , fluids , IVP/ IVCD meds.        Diagnosis:     POD 7- [facility] ECMO s/p arrest    Hypovolemia    Respiratory Failure     ARDS    Ventilator Management:  [x]AC-rest    [ ]CPAP-PS Wean    [ ]Trach Collar     [ ]Extubate    [ ] T-Piece  [x]peep>5     +14    Respiratory status required full ventilatory support, close monitoring of respiratory rate and breathing pattern, the following of ABG’s with A-line monitoring, continuous pulse oximetry monitoring     Requires chest PT, pulmonary toilet, ambu bagging, suctioning to maintain SaO2,  patent airway and treat atelectasis.     IVCD Precedex and Propofol for vent synchrony    Hemodynamic lability,  instability. Requires IVCD [x] vasopressors [] inotropes  [x] vasodilator- currently off  [x]IVSS fluid  to maintain MAP, perfusion, C.I.      Hypertension    IVCD Cardene    CHF- acute [ x]   chronic [  ]    systolic [ x]   diatolic [ ]          - Echo- EF -   30-35%, Severe global LV dysfunction      [ ] RV dysfunction          - Cxr-cardiomegally, edema          - Clinical-  [ ]inotropes   [x] pressors  [x]diuresis   [ ]IABP   [x]ECMO   [ ]LVAD   [x ]Respiratory Failure.     IVCD anticoagulation with [ ] Heparin  [ x] Argatroban for ECMO circuit     [facility] ECMO circuit    Renal Failure - Acute Kidney Injury      Fluid Overload     Hypernatremia    Ophelia 20 ppm    pancreatitis     Cardiogenic Shock - EF ? 8   ?    Thrombocytopenia    DM- IVCD Insulin    Requires bedside physical therapy, mobilization and total [facility] care.     ? ERCP ? Cardiac  Cath ?          By signing my name below, I, Maria D'Amico, attest that this documentation has been prepared under the direction and in the presence of Finn Zelaya MD.   Electronically Signed: Maria D'Amico, Scribe 12-02-22 @ 07:26      Discussed with CT surgeon, Physician Assistant - Nurse Practitioner- Critical care medicine team.   Discussed at  AM / PM rounds.   Chart, labs , films reviewed.    Cumulative Critical Care Time Given Today:  CRITICAL CARE ATTENDING - [facility]    MEDICATIONS  (STANDING):  argatroban Infusion 0.1 MICROgram(s)/kG/Min (0.56 mL/Hr) IV Continuous <Continuous>  artificial  tears Solution 1 Drop(s) Both EYES four times a day  atorvastatin 40 milliGRAM(s) Oral at bedtime  buMETAnide Injectable 2 milliGRAM(s) IV Push every 12 hours  chlorhexidine 0.12% Liquid 15 milliLiter(s) Oral Mucosa every 12 hours  chlorhexidine 2% Cloths 1 Application(s) Topical <User Schedule>  dexMEDEtomidine Infusion 1.5 MICROgram(s)/kG/Hr (34.8 mL/Hr) IV Continuous <Continuous>  dextrose 50% Injectable 50 milliLiter(s) IV Push every 15 minutes  insulin regular Infusion 6 Unit(s)/Hr (6 mL/Hr) IV Continuous <Continuous>  meropenem  IVPB 500 milliGRAM(s) IV Intermittent every 12 hours  metolazone 10 milliGRAM(s) Oral daily  niCARdipine Infusion 2 mG/Hr (10 mL/Hr) IV Continuous <Continuous>  norepinephrine Infusion 0.05 MICROgram(s)/kG/Min (8.71 mL/Hr) IV Continuous <Continuous>  pantoprazole  Injectable 40 milliGRAM(s) IV Push two times a day  polyethylene glycol 3350 17 Gram(s) Oral daily  potassium chloride  10 mEq/50 mL IVPB 10 milliEquivalent(s) IV Intermittent every 1 hour  propofol Infusion 17.94 MICROgram(s)/kG/Min (10 mL/Hr) IV Continuous <Continuous>  senna 2 Tablet(s) Oral at bedtime  sodium chloride 0.9% Bolus 500 milliLiter(s) IV Bolus once  sodium chloride 0.9%. 1000 milliLiter(s) (5 mL/Hr) IV Continuous <Continuous>                                    8.6    13.78 )-----------( 119      ( 02 Dec 2022 00:24 )             26.1       12-02    148<H>  |  114<H>  |  76<H>  ----------------------------<  142<H>  3.9   |  20<L>  |  3.28<H>    Ca    8.3<L>      02 Dec 2022 00:20  Phos  4.0     12-02  Mg     2.2     12-02    TPro  6.1  /  Alb  3.6  /  TBili  1.1  /  DBili  x   /  AST  38  /  ALT  10  /  AlkPhos  106  12-02      PT/INR - ( 02 Dec 2022 00:24 )   PT: 16.1 sec;   INR: 1.38 ratio         PTT - ( 02 Dec 2022 00:24 )  PTT:43.2 sec    Mode: AC/ CMV (Assist Control/ Continuous Mandatory Ventilation)  RR (machine): 14  FiO2: 50  PEEP: 12  ITime: 1  MAP: 16  PC: 10  PIP: 22      Daily     Daily       12-01 @ 07:01  -  12-02 @ 07:00  --------------------------------------------------------  IN: 3397.4 mL / OUT: 5055 mL / NET: -1657.6 mL        Critically Ill patient  : [ ] preoperative ,   [x] post operative    Requires :  [x] Arterial Line   [x] Central Line  [ ] PA catheter  [ ] IABP  [x] ECMO - VA  [ ] LVAD  [x] Ventilator  [ ] pacemaker [ ] Impella [x] Ophelia                      [x ] ABG's     [ x] Pulse Oxymetry Monitoring  Bedside evaluation , monitoring , treatment of hemodynamics , fluids , IVP/ IVCD meds.        Diagnosis:     POD 7- VA ECMO s/p arrest    Hypovolemia    Respiratory Failure     ARDS    Ventilator Management:  [x]AC-rest    [ ]CPAP-PS Wean    [ ]Trach Collar     [ ]Extubate    [ ] T-Piece  [x]peep>5     +14    Respiratory status required full ventilatory support, close monitoring of respiratory rate and breathing pattern, the following of ABG’s with A-line monitoring, continuous pulse oximetry monitoring     Requires chest PT, pulmonary toilet, ambu bagging, suctioning to maintain SaO2,  patent airway and treat atelectasis.     Difficult weaning process - multiple organ system involvement in critically ill patient     IVCD Precedex and Propofol for vent synchrony    Hemodynamic lability,  instability. Requires IVCD [x] vasopressors [] inotropes  [x] vasodilator- currently off  [x]IVSS fluid  to maintain MAP, perfusion, C.I.      Hypertension    IVCD Cardene    CHF- acute [ x]   chronic [  ]    systolic [ x]   diatolic [ ]          - Echo- EF -   30-35%, Severe global LV dysfunction      [ ] RV dysfunction          - Cxr-cardiomegally, edema          - Clinical-  [ ]inotropes   [x] pressors  [x]diuresis   [ ]IABP   [x]ECMO   [ ]LVAD   [x ]Respiratory Failure.     IVCD anticoagulation with [ ] Heparin  [ x] Argatroban for ECMO circuit     [facility] ECMO circuit    Renal Failure - Acute Kidney Injury      Fluid Overload     Hypernatremia    Ophelia 20 ppm    pancreatitis     Cardiogenic Shock - EF ? 8   ?    Thrombocytopenia    DM- IVCD Insulin    Requires bedside physical therapy, mobilization and total intermediate care.     ? ERCP ? Cardiac  Cath ?          By signing my name below, I, Maria D'Amico, attest that this documentation has been prepared under the direction and in the presence of Finn Zelaya MD.   Electronically Signed: Maria D'Amico, Scribe 12-02-22 @ 07:26    I, Finn Zelaya, personally performed the services described in this documentation. All medical record entries made by the scribe were at my direction and in my presence. I have reviewed the chart and agree that the record reflects my personal performance and is accurate and complete.   Finn Zelaya MD.     Discussed with CT surgeon, Physician Assistant - Nurse Practitioner- Critical care medicine team.   Discussed at  AM / PM rounds.   Chart, labs , films reviewed.    Cumulative Critical Care Time Given Today:  90 min CRITICAL CARE ATTENDING - [facility]ICU    MEDICATIONS  (STANDING):  argatroban Infusion 0.1 MICROgram(s)/kG/Min (0.56 mL/Hr) IV Continuous <Continuous>  artificial  tears Solution 1 Drop(s) Both EYES four times a day  atorvastatin 40 milliGRAM(s) Oral at bedtime  buMETAnide Injectable 2 milliGRAM(s) IV Push every 12 hours  chlorhexidine 0.12% Liquid 15 milliLiter(s) Oral Mucosa every 12 hours  chlorhexidine 2% Cloths 1 Application(s) Topical <User Schedule>  dexMEDEtomidine Infusion 1.5 MICROgram(s)/kG/Hr (34.8 mL/Hr) IV Continuous <Continuous>  dextrose 50% Injectable 50 milliLiter(s) IV Push every 15 minutes  insulin regular Infusion 6 Unit(s)/Hr (6 mL/Hr) IV Continuous <Continuous>  meropenem  IVPB 500 milliGRAM(s) IV Intermittent every 12 hours  metolazone 10 milliGRAM(s) Oral daily  niCARdipine Infusion 2 mG/Hr (10 mL/Hr) IV Continuous <Continuous>  norepinephrine Infusion 0.05 MICROgram(s)/kG/Min (8.71 mL/Hr) IV Continuous <Continuous>  pantoprazole  Injectable 40 milliGRAM(s) IV Push two times a day  polyethylene glycol 3350 17 Gram(s) Oral daily  potassium chloride  10 mEq/50 mL IVPB 10 milliEquivalent(s) IV Intermittent every 1 hour  propofol Infusion 17.94 MICROgram(s)/kG/Min (10 mL/Hr) IV Continuous <Continuous>  senna 2 Tablet(s) Oral at bedtime  sodium chloride 0.9% Bolus 500 milliLiter(s) IV Bolus once  sodium chloride 0.9%. 1000 milliLiter(s) (5 mL/Hr) IV Continuous <Continuous>                                    8.6    13.78 )-----------( 119      ( 02 Dec 2022 00:24 )             26.1       12-02    148<H>  |  114<H>  |  76<H>  ----------------------------<  142<H>  3.9   |  20<L>  |  3.28<H>    Ca    8.3<L>      02 Dec 2022 00:20  Phos  4.0     12-02  Mg     2.2     12-02    TPro  6.1  /  Alb  3.6  /  TBili  1.1  /  DBili  x   /  AST  38  /  ALT  10  /  AlkPhos  106  12-02      PT/INR - ( 02 Dec 2022 00:24 )   PT: 16.1 sec;   INR: 1.38 ratio         PTT - ( 02 Dec 2022 00:24 )  PTT:43.2 sec    Mode: AC/ CMV (Assist Control/ Continuous Mandatory Ventilation)  RR (machine): 14  FiO2: 50  PEEP: 12  ITime: 1  MAP: 16  PC: 10  PIP: 22      Daily     Daily       12-01 @ 07:01  -  12-02 @ 07:00  --------------------------------------------------------  IN: 3397.4 mL / OUT: 5055 mL / NET: -1657.6 mL        Critically Ill patient  : [ ] preoperative ,   [x] post operative    Requires :  [x] Arterial Line   [x] Central Line  [ ] PA catheter  [ ] IABP  [x] ECMO - VA  [ ] LVAD  [x] Ventilator  [ ] pacemaker [ ] Impella [x] Ophelia                      [x ] ABG's     [ x] Pulse Oxymetry Monitoring  Bedside evaluation , monitoring , treatment of hemodynamics , fluids , IVP/ IVCD meds.        Diagnosis:     POD 7- VA ECMO s/p arrest    Hypovolemia    Respiratory Failure     ARDS    Ventilator Management:  [x]AC-rest    [ ]CPAP-PS Wean    [ ]Trach Collar     [ ]Extubate    [ ] T-Piece  [x]peep>5     +14    Respiratory status required full ventilatory support, close monitoring of respiratory rate and breathing pattern, the following of ABG’s with A-line monitoring, continuous pulse oximetry monitoring     Requires chest PT, pulmonary toilet, ambu bagging, suctioning to maintain SaO2,  patent airway and treat atelectasis.     Difficult weaning process - multiple organ system involvement in critically ill patient     IVCD Precedex and Propofol for vent synchrony    Hemodynamic lability,  instability. Requires IVCD [x] vasopressors [] inotropes  [x] vasodilator- currently off  [x]IVSS fluid  to maintain MAP, perfusion, C.I.      Hypertension    IVCD Cardene    CHF- acute [ x]   chronic [  ]    systolic [ x]   diatolic [ ]          - Echo- EF -   30-35%, Severe global LV dysfunction      [ ] RV dysfunction          - Cxr-cardiomegally, edema          - Clinical-  [ ]inotropes   [x] pressors  [x]diuresis   [ ]IABP   [x]ECMO   [ ]LVAD   [x ]Respiratory Failure.     IVCD anticoagulation with [ ] Heparin  [ x] Argatroban for ECMO circuit     [facility] ECMO circuit    Renal Failure - Acute Kidney Injury      Fluid Overload     Hypernatremia    Ophelia 20 ppm    pancreatitis     Cardiogenic Shock - EF ? 8   ?    Thrombocytopenia    DM- IVCD Insulin    Requires bedside physical therapy, mobilization and total [facility].     ? ERCP ? Cardiac  Cath ?          By signing my name below, I, Maria D'Amico, attest that this documentation has been prepared under the direction and in the presence of Finn Zelaya MD.   Electronically Signed: Maria D'Amico, Scribe 12-02-22 @ 07:26    I, Finn Zelaya, personally performed the services described in this documentation. All medical record entries made by the scribe were at my direction and in my presence. I have reviewed the chart and agree that the record reflects my personal performance and is accurate and complete.   Finn Zelaya MD.     Discussed with CT surgeon, Physician Assistant - Nurse Practitioner- Critical care medicine team.   Discussed at  AM / PM rounds.   Chart, labs , films reviewed.    Cumulative Critical Care Time Given Today:  90 min CRITICAL CARE ATTENDING - CTICU    MEDICATIONS  (STANDING):  argatroban Infusion 0.1 MICROgram(s)/kG/Min (0.56 mL/Hr) IV Continuous <Continuous>  artificial  tears Solution 1 Drop(s) Both EYES four times a day  atorvastatin 40 milliGRAM(s) Oral at bedtime  buMETAnide Injectable 2 milliGRAM(s) IV Push every 12 hours  chlorhexidine 0.12% Liquid 15 milliLiter(s) Oral Mucosa every 12 hours  chlorhexidine 2% Cloths 1 Application(s) Topical <User Schedule>  dexMEDEtomidine Infusion 1.5 MICROgram(s)/kG/Hr (34.8 mL/Hr) IV Continuous <Continuous>  dextrose 50% Injectable 50 milliLiter(s) IV Push every 15 minutes  insulin regular Infusion 6 Unit(s)/Hr (6 mL/Hr) IV Continuous <Continuous>  meropenem  IVPB 500 milliGRAM(s) IV Intermittent every 12 hours  metolazone 10 milliGRAM(s) Oral daily  niCARdipine Infusion 2 mG/Hr (10 mL/Hr) IV Continuous <Continuous>  norepinephrine Infusion 0.05 MICROgram(s)/kG/Min (8.71 mL/Hr) IV Continuous <Continuous>  pantoprazole  Injectable 40 milliGRAM(s) IV Push two times a day  polyethylene glycol 3350 17 Gram(s) Oral daily  potassium chloride  10 mEq/50 mL IVPB 10 milliEquivalent(s) IV Intermittent every 1 hour  propofol Infusion 17.94 MICROgram(s)/kG/Min (10 mL/Hr) IV Continuous <Continuous>  senna 2 Tablet(s) Oral at bedtime  sodium chloride 0.9% Bolus 500 milliLiter(s) IV Bolus once  sodium chloride 0.9%. 1000 milliLiter(s) (5 mL/Hr) IV Continuous <Continuous>                                    8.6    13.78 )-----------( 119      ( 02 Dec 2022 00:24 )             26.1       12-02    148<H>  |  114<H>  |  76<H>  ----------------------------<  142<H>  3.9   |  20<L>  |  3.28<H>    Ca    8.3<L>      02 Dec 2022 00:20  Phos  4.0     12-02  Mg     2.2     12-02    TPro  6.1  /  Alb  3.6  /  TBili  1.1  /  DBili  x   /  AST  38  /  ALT  10  /  AlkPhos  106  12-02      PT/INR - ( 02 Dec 2022 00:24 )   PT: 16.1 sec;   INR: 1.38 ratio         PTT - ( 02 Dec 2022 00:24 )  PTT:43.2 sec    Mode: AC/ CMV (Assist Control/ Continuous Mandatory Ventilation)  RR (machine): 14  FiO2: 50  PEEP: 12  ITime: 1  MAP: 16  PC: 10  PIP: 22      Daily     Daily       12-01 @ 07:01  -  12-02 @ 07:00  --------------------------------------------------------  IN: 3397.4 mL / OUT: 5055 mL / NET: -1657.6 mL        Critically Ill patient  : [ ] preoperative ,   [x] post operative    Requires :  [x] Arterial Line   [x] Central Line  [ ] PA catheter  [ ] IABP  [x] ECMO - VA  [ ] LVAD  [x] Ventilator  [ ] pacemaker [ ] Impella [x] Ophelia                      [x ] ABG's     [ x] Pulse Oxymetry Monitoring  Bedside evaluation , monitoring , treatment of hemodynamics , fluids , IVP/ IVCD meds.        Diagnosis:     POD 7- VA ECMO s/p arrest    Hypovolemia    Respiratory Failure     ARDS    Ventilator Management:  [x]AC-rest    [ ]CPAP-PS Wean    [ ]Trach Collar     [ ]Extubate    [ ] T-Piece  [x]peep>5     +14    Respiratory status required full ventilatory support, close monitoring of respiratory rate and breathing pattern, the following of ABG’s with A-line monitoring, continuous pulse oximetry monitoring     Requires chest PT, pulmonary toilet, ambu bagging, suctioning to maintain SaO2,  patent airway and treat atelectasis.     Difficult weaning process - multiple organ system involvement in critically ill patient     IVCD Precedex and Propofol for vent synchrony    Hemodynamic lability,  instability. Requires IVCD [x] vasopressors [] inotropes  [x] vasodilator- currently off  [x]IVSS fluid  to maintain MAP, perfusion, C.I.      Hypertension    IVCD Cardene    CHF- acute [ x]   chronic [  ]    systolic [ x]   diatolic [ ]          - Echo- EF -   30-35%, Severe global LV dysfunction      [ ] RV dysfunction          - Cxr-cardiomegally, edema          - Clinical-  [ ]inotropes   [x] pressors  [x]diuresis   [ ]IABP   [x]ECMO   [ ]LVAD   [x ]Respiratory Failure.     IVCD anticoagulation with [ ] Heparin  [ x] Argatroban for ECMO circuit     VA ECMO circuit    Renal Failure - Acute Kidney Injury      Fluid Overload     Hypernatremia    Ophelia 20 ppm    pancreatitis     Cardiogenic Shock - EF ? 8   ?    Thrombocytopenia    DM- IVCD Insulin    Requires bedside physical therapy, mobilization and total senior living care.     ? ERCP ? Cardiac  Cath ?          By signing my name below, I, Maria D'Amico, attest that this documentation has been prepared under the direction and in the presence of Finn Zelaya MD.   Electronically Signed: Maria D'Amico, Scribe 12-02-22 @ 07:26    I, Finn Zelaya, personally performed the services described in this documentation. All medical record entries made by the scribe were at my direction and in my presence. I have reviewed the chart and agree that the record reflects my personal performance and is accurate and complete.   Finn Zelaya MD.     Discussed with CT surgeon, Physician Assistant - Nurse Practitioner- Critical care medicine team.   Discussed at  AM / PM rounds.   Chart, labs , films reviewed.    Cumulative Critical Care Time Given Today:  90 min

## 2022-12-02 NOTE — PROGRESS NOTE ADULT - ATTENDING COMMENTS
CXR much improved today after bronch yesterday. Cr worsening, likely from overdiuresis. Discussed timing of LHC and IABP with multidisciplinary team today. Since CXR is improved the need for a LV vent is not as critical and he would need significant contrast for an unknown graft LHC. Will therefore defer at this time to allow for further renal recovery after stopping diuretics. MAPs lower likely due to sedation and hypovolemia. Tentative plan for LHC/IABP early next week pending renal function. ECMO flows increased by CTS today.

## 2022-12-02 NOTE — PROGRESS NOTE ADULT - SUBJECTIVE AND OBJECTIVE BOX
Chief Complaint:  Patient is a 62y old  Male who presents with a chief complaint of Acute cholecystitis, gallstone pancreatitis (02 Dec 2022 08:03)       Interval Events:   US with normal caliber bile ducts and CBD 3 mm, PD 3mm, other portions poorly visualized (pancreas); cholelithiasis with minimal thickening of GB wall 4 mm, trace pericholecystic fluid, moderate pleural effusion   Bili/ALP/AST/ALT wnl  Remains on levo and ecmo, intubated sedated    Hospital Medications:  argatroban Infusion 0.1 MICROgram(s)/kG/Min IV Continuous <Continuous>  artificial  tears Solution 1 Drop(s) Both EYES four times a day  atorvastatin 40 milliGRAM(s) Oral at bedtime  buMETAnide Injectable 2 milliGRAM(s) IV Push every 12 hours  chlorhexidine 0.12% Liquid 15 milliLiter(s) Oral Mucosa every 12 hours  chlorhexidine 2% Cloths 1 Application(s) Topical <User Schedule>  dexMEDEtomidine Infusion 1.5 MICROgram(s)/kG/Hr IV Continuous <Continuous>  dextrose 50% Injectable 50 milliLiter(s) IV Push every 15 minutes  HYDROmorphone  Injectable 0.5 milliGRAM(s) IV Push every 4 hours PRN  insulin regular Infusion 6 Unit(s)/Hr IV Continuous <Continuous>  meropenem  IVPB 500 milliGRAM(s) IV Intermittent every 12 hours  metolazone 10 milliGRAM(s) Oral daily  niCARdipine Infusion 2 mG/Hr IV Continuous <Continuous>  norepinephrine Infusion 0.05 MICROgram(s)/kG/Min IV Continuous <Continuous>  pantoprazole  Injectable 40 milliGRAM(s) IV Push two times a day  polyethylene glycol 3350 17 Gram(s) Oral daily  potassium chloride  10 mEq/50 mL IVPB 10 milliEquivalent(s) IV Intermittent every 1 hour  propofol Infusion 17.94 MICROgram(s)/kG/Min IV Continuous <Continuous>  senna 2 Tablet(s) Oral at bedtime  sodium chloride 0.9% lock flush 10 milliLiter(s) IV Push every 1 hour PRN  sodium chloride 0.9%. 1000 milliLiter(s) IV Continuous <Continuous>      ROS:   Unable to assess ROS due to patient's condition.     PHYSICAL EXAM:   Vital Signs:  Vital Signs Last 24 Hrs  T(C): 37.1 (02 Dec 2022 08:00), Max: 37.7 (02 Dec 2022 00:00)  T(F): 98.8 (02 Dec 2022 08:00), Max: 99.9 (02 Dec 2022 00:00)  HR: 72 (02 Dec 2022 09:46) (63 - 80)  BP: --  BP(mean): --  RR: 14 (02 Dec 2022 09:00) (0 - 22)  SpO2: 100% (02 Dec 2022 09:46) (99% - 100%)    Parameters below as of 02 Dec 2022 00:00  Patient On (Oxygen Delivery Method): ventilator    O2 Concentration (%): 40  Daily     Daily     GENERAL: sedated  NEURO: unable to assess due to condition  CHEST: intubated  CARDIAC: regular rate, rhythm on ecmo  ABDOMEN: soft, non-distended  EXTREMITIES: +edema  SKIN: no jaundice    LABS: reviewed                        8.6    13.78 )-----------( 119      ( 02 Dec 2022 00:24 )             26.1     12-02    148<H>  |  114<H>  |  76<H>  ----------------------------<  142<H>  3.9   |  20<L>  |  3.28<H>    Ca    8.3<L>      02 Dec 2022 00:20  Phos  4.0     12-02  Mg     2.2     12-02    TPro  6.1  /  Alb  3.6  /  TBili  1.1  /  DBili  x   /  AST  38  /  ALT  10  /  AlkPhos  106  12-02    LIVER FUNCTIONS - ( 02 Dec 2022 00:20 )  Alb: 3.6 g/dL / Pro: 6.1 g/dL / ALK PHOS: 106 U/L / ALT: 10 U/L / AST: 38 U/L / GGT: x             Interval Diagnostic Studies: see sunrise for full report

## 2022-12-03 LAB
ALBUMIN SERPL ELPH-MCNC: 3.6 G/DL — SIGNIFICANT CHANGE UP (ref 3.3–5)
ALP SERPL-CCNC: 131 U/L — HIGH (ref 40–120)
ALT FLD-CCNC: 7 U/L — LOW (ref 10–45)
AMYLASE P1 CFR SERPL: 319 U/L — HIGH (ref 25–125)
ANION GAP SERPL CALC-SCNC: 17 MMOL/L — SIGNIFICANT CHANGE UP (ref 5–17)
APPEARANCE UR: ABNORMAL
APTT BLD: 45.4 SEC — HIGH (ref 27.5–35.5)
AST SERPL-CCNC: 35 U/L — SIGNIFICANT CHANGE UP (ref 10–40)
BACTERIA # UR AUTO: NEGATIVE — SIGNIFICANT CHANGE UP
BASE EXCESS BLDMV CALC-SCNC: -3.1 MMOL/L — LOW (ref -3–3)
BASE EXCESS BLDMV CALC-SCNC: -5.2 MMOL/L — LOW (ref -3–3)
BILIRUB SERPL-MCNC: 1 MG/DL — SIGNIFICANT CHANGE UP (ref 0.2–1.2)
BILIRUB UR-MCNC: NEGATIVE — SIGNIFICANT CHANGE UP
BUN SERPL-MCNC: 92 MG/DL — HIGH (ref 7–23)
CALCIUM SERPL-MCNC: 8.2 MG/DL — LOW (ref 8.4–10.5)
CHLORIDE SERPL-SCNC: 115 MMOL/L — HIGH (ref 96–108)
CO2 BLDMV-SCNC: 22 MMOL/L — SIGNIFICANT CHANGE UP (ref 21–29)
CO2 BLDMV-SCNC: 23 MMOL/L — SIGNIFICANT CHANGE UP (ref 21–29)
CO2 SERPL-SCNC: 20 MMOL/L — LOW (ref 22–31)
COLOR SPEC: SIGNIFICANT CHANGE UP
CREAT SERPL-MCNC: 3.61 MG/DL — HIGH (ref 0.5–1.3)
CULTURE RESULTS: SIGNIFICANT CHANGE UP
DIFF PNL FLD: ABNORMAL
EGFR: 18 ML/MIN/1.73M2 — LOW
EPI CELLS # UR: 1 /HPF — SIGNIFICANT CHANGE UP
FIBRINOGEN PPP-MCNC: 183 MG/DL — LOW (ref 200–445)
GAS PNL BLDA: SIGNIFICANT CHANGE UP
GAS PNL BLDMV: SIGNIFICANT CHANGE UP
GLUCOSE BLDC GLUCOMTR-MCNC: 102 MG/DL — HIGH (ref 70–99)
GLUCOSE BLDC GLUCOMTR-MCNC: 105 MG/DL — HIGH (ref 70–99)
GLUCOSE BLDC GLUCOMTR-MCNC: 109 MG/DL — HIGH (ref 70–99)
GLUCOSE BLDC GLUCOMTR-MCNC: 115 MG/DL — HIGH (ref 70–99)
GLUCOSE BLDC GLUCOMTR-MCNC: 116 MG/DL — HIGH (ref 70–99)
GLUCOSE BLDC GLUCOMTR-MCNC: 119 MG/DL — HIGH (ref 70–99)
GLUCOSE BLDC GLUCOMTR-MCNC: 125 MG/DL — HIGH (ref 70–99)
GLUCOSE BLDC GLUCOMTR-MCNC: 127 MG/DL — HIGH (ref 70–99)
GLUCOSE BLDC GLUCOMTR-MCNC: 129 MG/DL — HIGH (ref 70–99)
GLUCOSE BLDC GLUCOMTR-MCNC: 130 MG/DL — HIGH (ref 70–99)
GLUCOSE BLDC GLUCOMTR-MCNC: 131 MG/DL — HIGH (ref 70–99)
GLUCOSE BLDC GLUCOMTR-MCNC: 132 MG/DL — HIGH (ref 70–99)
GLUCOSE BLDC GLUCOMTR-MCNC: 138 MG/DL — HIGH (ref 70–99)
GLUCOSE BLDC GLUCOMTR-MCNC: 147 MG/DL — HIGH (ref 70–99)
GLUCOSE BLDC GLUCOMTR-MCNC: 149 MG/DL — HIGH (ref 70–99)
GLUCOSE BLDC GLUCOMTR-MCNC: 171 MG/DL — HIGH (ref 70–99)
GLUCOSE SERPL-MCNC: 118 MG/DL — HIGH (ref 70–99)
GLUCOSE UR QL: NEGATIVE — SIGNIFICANT CHANGE UP
HAPTOGLOB SERPL-MCNC: 205 MG/DL — HIGH (ref 34–200)
HCO3 BLDMV-SCNC: 21 MMOL/L — SIGNIFICANT CHANGE UP (ref 20–28)
HCO3 BLDMV-SCNC: 22 MMOL/L — SIGNIFICANT CHANGE UP (ref 20–28)
HCT VFR BLD CALC: 25.5 % — LOW (ref 39–50)
HGB BLD-MCNC: 8.4 G/DL — LOW (ref 13–17)
HOROWITZ INDEX BLDMV+IHG-RTO: 100 — SIGNIFICANT CHANGE UP
HYALINE CASTS # UR AUTO: 2 /LPF — SIGNIFICANT CHANGE UP (ref 0–2)
KETONES UR-MCNC: NEGATIVE — SIGNIFICANT CHANGE UP
LDH SERPL L TO P-CCNC: 417 U/L — HIGH (ref 50–242)
LEUKOCYTE ESTERASE UR-ACNC: NEGATIVE — SIGNIFICANT CHANGE UP
LIDOCAIN IGE QN: 593 U/L — HIGH (ref 7–60)
MAGNESIUM SERPL-MCNC: 2.4 MG/DL — SIGNIFICANT CHANGE UP (ref 1.6–2.6)
MCHC RBC-ENTMCNC: 29.1 PG — SIGNIFICANT CHANGE UP (ref 27–34)
MCHC RBC-ENTMCNC: 32.9 GM/DL — SIGNIFICANT CHANGE UP (ref 32–36)
MCV RBC AUTO: 88.2 FL — SIGNIFICANT CHANGE UP (ref 80–100)
NITRITE UR-MCNC: NEGATIVE — SIGNIFICANT CHANGE UP
NRBC # BLD: 0 /100 WBCS — SIGNIFICANT CHANGE UP (ref 0–0)
O2 CT VFR BLD CALC: 41 MMHG — SIGNIFICANT CHANGE UP (ref 30–65)
O2 CT VFR BLD CALC: 43 MMHG — SIGNIFICANT CHANGE UP (ref 30–65)
OSMOLALITY UR: 426 MOS/KG — SIGNIFICANT CHANGE UP (ref 300–900)
PCO2 BLDMV: 39 MMHG — SIGNIFICANT CHANGE UP (ref 30–65)
PCO2 BLDMV: 40 MMHG — SIGNIFICANT CHANGE UP (ref 30–65)
PH BLDMV: 7.32 — SIGNIFICANT CHANGE UP (ref 7.32–7.45)
PH BLDMV: 7.36 — SIGNIFICANT CHANGE UP (ref 7.32–7.45)
PH UR: 5.5 — SIGNIFICANT CHANGE UP (ref 5–8)
PHOSPHATE SERPL-MCNC: 5.5 MG/DL — HIGH (ref 2.5–4.5)
PLATELET # BLD AUTO: 111 K/UL — LOW (ref 150–400)
POTASSIUM SERPL-MCNC: 4.3 MMOL/L — SIGNIFICANT CHANGE UP (ref 3.5–5.3)
POTASSIUM SERPL-SCNC: 4.3 MMOL/L — SIGNIFICANT CHANGE UP (ref 3.5–5.3)
PROT SERPL-MCNC: 6.3 G/DL — SIGNIFICANT CHANGE UP (ref 6–8.3)
PROT UR-MCNC: ABNORMAL
RBC # BLD: 2.89 M/UL — LOW (ref 4.2–5.8)
RBC # FLD: 15.3 % — HIGH (ref 10.3–14.5)
RBC CASTS # UR COMP ASSIST: 43 /HPF — HIGH (ref 0–4)
SAO2 % BLDMV: 75.7 — SIGNIFICANT CHANGE UP (ref 60–90)
SAO2 % BLDMV: 75.9 — SIGNIFICANT CHANGE UP (ref 60–90)
SODIUM SERPL-SCNC: 152 MMOL/L — HIGH (ref 135–145)
SP GR SPEC: 1.01 — SIGNIFICANT CHANGE UP (ref 1.01–1.02)
SPECIMEN SOURCE: SIGNIFICANT CHANGE UP
UROBILINOGEN FLD QL: ABNORMAL
WBC # BLD: 16.4 K/UL — HIGH (ref 3.8–10.5)
WBC # FLD AUTO: 16.4 K/UL — HIGH (ref 3.8–10.5)
WBC UR QL: 2 /HPF — SIGNIFICANT CHANGE UP (ref 0–5)

## 2022-12-03 PROCEDURE — 99233 SBSQ HOSP IP/OBS HIGH 50: CPT | Mod: GC

## 2022-12-03 PROCEDURE — 99291 CRITICAL CARE FIRST HOUR: CPT

## 2022-12-03 PROCEDURE — 93306 TTE W/DOPPLER COMPLETE: CPT | Mod: 26

## 2022-12-03 PROCEDURE — 99292 CRITICAL CARE ADDL 30 MIN: CPT

## 2022-12-03 PROCEDURE — 71045 X-RAY EXAM CHEST 1 VIEW: CPT | Mod: 26,76

## 2022-12-03 RX ORDER — CALCIUM GLUCONATE 100 MG/ML
1 VIAL (ML) INTRAVENOUS ONCE
Refills: 0 | Status: COMPLETED | OUTPATIENT
Start: 2022-12-03 | End: 2022-12-03

## 2022-12-03 RX ORDER — SODIUM BICARBONATE 1 MEQ/ML
50 SYRINGE (ML) INTRAVENOUS ONCE
Refills: 0 | Status: COMPLETED | OUTPATIENT
Start: 2022-12-03 | End: 2022-12-03

## 2022-12-03 RX ORDER — VANCOMYCIN HCL 1 G
1000 VIAL (EA) INTRAVENOUS ONCE
Refills: 0 | Status: COMPLETED | OUTPATIENT
Start: 2022-12-03 | End: 2022-12-03

## 2022-12-03 RX ORDER — POTASSIUM CHLORIDE 20 MEQ
10 PACKET (EA) ORAL
Refills: 0 | Status: COMPLETED | OUTPATIENT
Start: 2022-12-03 | End: 2022-12-03

## 2022-12-03 RX ORDER — SODIUM BICARBONATE 1 MEQ/ML
0.08 SYRINGE (ML) INTRAVENOUS
Qty: 150 | Refills: 0 | Status: DISCONTINUED | OUTPATIENT
Start: 2022-12-03 | End: 2022-12-04

## 2022-12-03 RX ADMIN — Medication 8.71 MICROGRAM(S)/KG/MIN: at 21:26

## 2022-12-03 RX ADMIN — Medication 50 MILLIEQUIVALENT(S): at 05:47

## 2022-12-03 RX ADMIN — Medication 1 DROP(S): at 05:30

## 2022-12-03 RX ADMIN — CHLORHEXIDINE GLUCONATE 1 APPLICATION(S): 213 SOLUTION TOPICAL at 05:27

## 2022-12-03 RX ADMIN — Medication 100 GRAM(S): at 00:30

## 2022-12-03 RX ADMIN — MEROPENEM 100 MILLIGRAM(S): 1 INJECTION INTRAVENOUS at 05:27

## 2022-12-03 RX ADMIN — Medication 50 MILLIEQUIVALENT(S): at 06:45

## 2022-12-03 RX ADMIN — DEXMEDETOMIDINE HYDROCHLORIDE IN 0.9% SODIUM CHLORIDE 34.8 MICROGRAM(S)/KG/HR: 4 INJECTION INTRAVENOUS at 21:26

## 2022-12-03 RX ADMIN — PROPOFOL 10 MICROGRAM(S)/KG/MIN: 10 INJECTION, EMULSION INTRAVENOUS at 07:38

## 2022-12-03 RX ADMIN — ARGATROBAN 0.56 MICROGRAM(S)/KG/MIN: 50 INJECTION, SOLUTION INTRAVENOUS at 07:40

## 2022-12-03 RX ADMIN — PANTOPRAZOLE SODIUM 40 MILLIGRAM(S): 20 TABLET, DELAYED RELEASE ORAL at 17:55

## 2022-12-03 RX ADMIN — Medication 8.71 MICROGRAM(S)/KG/MIN: at 07:40

## 2022-12-03 RX ADMIN — SODIUM CHLORIDE 5 MILLILITER(S): 9 INJECTION INTRAMUSCULAR; INTRAVENOUS; SUBCUTANEOUS at 07:41

## 2022-12-03 RX ADMIN — Medication 50 MILLIEQUIVALENT(S): at 08:14

## 2022-12-03 RX ADMIN — Medication 50 MILLIEQUIVALENT(S): at 06:55

## 2022-12-03 RX ADMIN — Medication 50 MILLIEQUIVALENT(S): at 07:15

## 2022-12-03 RX ADMIN — Medication 50 MEQ/KG/HR: at 11:07

## 2022-12-03 RX ADMIN — ATORVASTATIN CALCIUM 40 MILLIGRAM(S): 80 TABLET, FILM COATED ORAL at 21:26

## 2022-12-03 RX ADMIN — DEXMEDETOMIDINE HYDROCHLORIDE IN 0.9% SODIUM CHLORIDE 34.8 MICROGRAM(S)/KG/HR: 4 INJECTION INTRAVENOUS at 07:38

## 2022-12-03 RX ADMIN — ARGATROBAN 0.56 MICROGRAM(S)/KG/MIN: 50 INJECTION, SOLUTION INTRAVENOUS at 21:26

## 2022-12-03 RX ADMIN — Medication 1 DROP(S): at 23:08

## 2022-12-03 RX ADMIN — PROPOFOL 10 MICROGRAM(S)/KG/MIN: 10 INJECTION, EMULSION INTRAVENOUS at 21:26

## 2022-12-03 RX ADMIN — INSULIN HUMAN 6 UNIT(S)/HR: 100 INJECTION, SOLUTION SUBCUTANEOUS at 07:38

## 2022-12-03 RX ADMIN — CHLORHEXIDINE GLUCONATE 15 MILLILITER(S): 213 SOLUTION TOPICAL at 05:27

## 2022-12-03 RX ADMIN — INSULIN HUMAN 6 UNIT(S)/HR: 100 INJECTION, SOLUTION SUBCUTANEOUS at 21:26

## 2022-12-03 RX ADMIN — MEROPENEM 100 MILLIGRAM(S): 1 INJECTION INTRAVENOUS at 17:54

## 2022-12-03 RX ADMIN — Medication 1 DROP(S): at 11:08

## 2022-12-03 RX ADMIN — Medication 1 DROP(S): at 17:54

## 2022-12-03 RX ADMIN — Medication 100 GRAM(S): at 06:55

## 2022-12-03 RX ADMIN — POLYETHYLENE GLYCOL 3350 17 GRAM(S): 17 POWDER, FOR SOLUTION ORAL at 11:08

## 2022-12-03 RX ADMIN — SENNA PLUS 2 TABLET(S): 8.6 TABLET ORAL at 21:25

## 2022-12-03 RX ADMIN — PANTOPRAZOLE SODIUM 40 MILLIGRAM(S): 20 TABLET, DELAYED RELEASE ORAL at 05:30

## 2022-12-03 RX ADMIN — CHLORHEXIDINE GLUCONATE 15 MILLILITER(S): 213 SOLUTION TOPICAL at 17:54

## 2022-12-03 NOTE — PROGRESS NOTE ADULT - PROBLEM SELECTOR PLAN 3
- CXR improved with more lung volumes after bronch and increased PEEP  - bronch showed erythematous airways with scant bleeding  - plan to keep net even today  - c/w ECMO , adjust sweep and FdO2 according to ABG  - extubate when able  - continue checking right radial ABG.  - continue treatment for pancreatitis related ARDS. - CXR improved with more lung volumes after bronch and increased PEEP  - bronch showed erythematous airways with scant bleeding  - plan to keep net even today  - extubate when able  - continue checking right radial ABG.  - continue treatment for pancreatitis related ARDS.

## 2022-12-03 NOTE — PROGRESS NOTE ADULT - PROBLEM SELECTOR PLAN 2
troponin peaked at > 13956   - plan for LHC with IABP once recovers from ERIC  - continue statin  - would start ASA when okay with surgical team. troponin peaked at > 57323   - plan for LHC with IABP once recovers from ERIC  - continue statin  - would start ASA when okay with surgical team. troponin peaked at > 18198   - plan for LHC with IABP once recovers from ERIC  - continue statin  - would start ASA when okay with surgical team. troponin peaked at > 30932   - plan for LHC with IABP once recovers from ERIC, likely early next week   - continue statin  - would start ASA when okay with surgical team. troponin peaked at > 77234   - plan for LHC with IABP once recovers from ERIC, likely early next week   - continue statin  - would start ASA when okay with surgical team. troponin peaked at > 70775   - plan for LHC with IABP once recovers from ERIC, likely early next week   - continue statin  - would start ASA when okay with surgical team.

## 2022-12-03 NOTE — PROGRESS NOTE ADULT - PROBLEM SELECTOR PLAN 2
In setting of diuretic use. SNa worsened to 152. FW deficit at least 3L.  Increase the free water flushes to 300 Q2H . Give a dose of 10mg Metolazone (diuresis and improve Na). Please obtain urine osm to see if patient is responding properly by making concentrated urine  Monitor SNa.    If you have any questions, please feel free to contact me  Abhi Leone  Nephrology Fellow  [phone]; Prefer [employer] TEAMS  (After 5pm or on weekends please page the on-call fellow).    Patient was discussed with Dr. Urena  who agrees with my A/P. Addendum to follow In setting of diuretic use. SNa worsened to 152. FW deficit at least 3L.  Increase the free water flushes to 300 Q2H . Give a dose of 10mg Metolazone (diuresis and improve Na). Please obtain urine osm to see if patient is responding properly by making concentrated urine  Monitor SNa.    If you have any questions, please feel free to contact me  Abhi Leone  Nephrology Fellow  [phone]; Prefer [employer] TEAMS  (After 5pm or on weekends please page the on-call fellow).    Patient was discussed with Dr. Urena  who agrees with my A/P. Addendum to follow In setting of diuretic use. SNa worsened to 152. FW deficit at least 3L.  Increase the free water flushes to 300 Q2H . Give a dose of 10mg Metolazone (diuresis and improve Na). Please obtain urine osm to see if patient is responding properly by making concentrated urine  Monitor SNa.    If you have any questions, please feel free to contact me  Abhi Leone  Nephrology Fellow  [phone]; Prefer [employer] TEAMS  (After 5pm or on weekends please page the on-call fellow).    Patient was discussed with Dr. Urena  who agrees with my A/P. Addendum to follow

## 2022-12-03 NOTE — PROGRESS NOTE ADULT - PROBLEM SELECTOR PLAN 4
- CT abd showing acute pancreatitis  - RUQ showing sludge, no stones  - lipase > 3000 11/24, normalized to 40 and now uptrended to 900 with resuming NGT feeding   - conservative care  - appreciate GI recs, possible ERCP once stable  - surgery following   - on epimeric meropenem  - had a low grade fever despite Abx- f/u infectious workup - CT abd showing acute pancreatitis  - RUQ showing sludge, no stones  - lipase > 3000 11/24, normalized to 40 and now uptrended to 900 with resuming NGT feeding   - conservative care  - appreciate GI recs, no intervention planned   - surgery following   - on epimeric meropenem  - had a low grade fever despite Abx- f/u infectious workup

## 2022-12-03 NOTE — PROGRESS NOTE ADULT - SUBJECTIVE AND OBJECTIVE BOX
Interval History: Remains intubated. Overnight events noted     Medications:  argatroban Infusion 0.1 MICROgram(s)/kG/Min IV Continuous <Continuous>  artificial  tears Solution 1 Drop(s) Both EYES four times a day  atorvastatin 40 milliGRAM(s) Oral at bedtime  chlorhexidine 0.12% Liquid 15 milliLiter(s) Oral Mucosa every 12 hours  chlorhexidine 2% Cloths 1 Application(s) Topical <User Schedule>  dexMEDEtomidine Infusion 1.5 MICROgram(s)/kG/Hr IV Continuous <Continuous>  dextrose 50% Injectable 50 milliLiter(s) IV Push every 15 minutes  HYDROmorphone  Injectable 0.5 milliGRAM(s) IV Push every 4 hours PRN  insulin regular Infusion 6 Unit(s)/Hr IV Continuous <Continuous>  meropenem  IVPB 500 milliGRAM(s) IV Intermittent every 12 hours  niCARdipine Infusion 2 mG/Hr IV Continuous <Continuous>  norepinephrine Infusion 0.05 MICROgram(s)/kG/Min IV Continuous <Continuous>  pantoprazole  Injectable 40 milliGRAM(s) IV Push two times a day  polyethylene glycol 3350 17 Gram(s) Oral daily  propofol Infusion 17.94 MICROgram(s)/kG/Min IV Continuous <Continuous>  senna 2 Tablet(s) Oral at bedtime  sodium bicarbonate  Infusion 0.081 mEq/kG/Hr IV Continuous <Continuous>  sodium chloride 0.9% lock flush 10 milliLiter(s) IV Push every 1 hour PRN  sodium chloride 0.9%. 1000 milliLiter(s) IV Continuous <Continuous>      Vitals:  T(C): 2.9 (22 @ 08:00), Max: 37.7 (22 @ 08:00)  HR: 70 (22 @ 10:44) (64 - 75)  ABP: 148/58 (22 @ 10:30) (99/50 - 159/68)  ABP(mean): 77 (22 @ 10:30) (62 - 90)  RR: 14 (22 @ 10:30) (7 - 37)  SpO2: 100% (22 @ 10:44) (100% - 100%)  PA: 29/2 (22 @ 10:30) (24/8 - 38/8)  PA(mean): 17 (22 @ 10:30) (14 - 23)    Daily     Daily Weight in k.2 (03 Dec 2022 00:00)        I&O's Summary    02 Dec 2022 07:01  -  03 Dec 2022 07:00  --------------------------------------------------------  IN: 3795.3 mL / OUT: 3280 mL / NET: 515.3 mL    03 Dec 2022 07:01  -  03 Dec 2022 11:21  --------------------------------------------------------  IN: 852.6 mL / OUT: 385 mL / NET: 467.6 mL        Physical Exam:  RASS -3, intubated   Neck supple, RIJ in place  Heart RRR s1s2 no m   Lungs clear, mechanical bs  Ext warm trace dependent edema      Labs:                        8.4    16.40 )-----------( 111      ( 03 Dec 2022 00:40 )             25.5         152<H>  |  115<H>  |  92<H>  ----------------------------<  118<H>  4.3   |  20<L>  |  3.61<H>    Ca    8.2<L>      03 Dec 2022 00:39  Phos  5.5       Mg     2.4         TPro  6.3  /  Alb  3.6  /  TBili  1.0  /  DBili  x   /  AST  35  /  ALT  7<L>  /  AlkPhos  131<H>      PT/INR - ( 02 Dec 2022 00:24 )   PT: 16.1 sec;   INR: 1.38 ratio         PTT - ( 03 Dec 2022 00:39 )  PTT:45.4 sec        Oxygen Saturation, Mixed: 75.7 ( @ 06:00)  Oxygen Saturation, Mixed: 75.9 ( @ 00:06)  Oxygen Saturation, Mixed: 69.0 ( @ 03:50)  Oxygen Saturation, Mixed: 67.4 ( @ 00:00)  Oxygen Saturation, Mixed: 72.3 ( @ 01:00)    Lactate Dehydrogenase, Serum: 417 U/L ( @ 00:39)  Lactate Dehydrogenase, Serum: 462 U/L ( @ 00:20)  Lactate Dehydrogenase, Serum: 494 U/L ( @ 01:08)

## 2022-12-03 NOTE — PROGRESS NOTE ADULT - SUBJECTIVE AND OBJECTIVE BOX
[facility] Division of Kidney Diseases & Hypertension  FOLLOW UP NOTE  [phone]--------------------------------------------------------------------------------  Chief Complaint:Acute pancreatitis without infection or necrosis    HPI:  63 y/o male with PMH of CABG, DM, HTN, HLD presenting as transfer from [place] for c/f STEMI. Course c/b gallstone pancreatitis leading to ARDS and shock & cardiac arrest now on [facility]. Had significant troponin elevation and his LVEF down to 8%. Pt was deemed to be too unstable to have an angiogram and potential PCI due to his co-morbidities & a new subdural bleed. Nephrology called for ERIC. SCr on arrival was 2.15; trended down to hector 1.6 on 11/25; slowly trended up & peaked to 3.95 11/28.     Patient seen & examined today, family at bedside. Pt is intubated, unable to obtain ROS. Patient is no longer receiving diuretics. Hypernatremia is worsening and creatinine also increased to 3.6. VCP of 4-6      PAST HISTORY  --------------------------------------------------------------------------------  No significant changes to PMH, PSH, FHx, SHx, unless otherwise noted    ALLERGIES & MEDICATIONS  --------------------------------------------------------------------------------  Allergies    No Known Allergies    Intolerances      Standing Inpatient Medications  argatroban Infusion 0.1 MICROgram(s)/kG/Min IV Continuous <Continuous>  artificial  tears Solution 1 Drop(s) Both EYES four times a day  atorvastatin 40 milliGRAM(s) Oral at bedtime  chlorhexidine 0.12% Liquid 15 milliLiter(s) Oral Mucosa every 12 hours  chlorhexidine 2% Cloths 1 Application(s) Topical <User Schedule>  dexMEDEtomidine Infusion 1.5 MICROgram(s)/kG/Hr IV Continuous <Continuous>  dextrose 50% Injectable 50 milliLiter(s) IV Push every 15 minutes  insulin regular Infusion 6 Unit(s)/Hr IV Continuous <Continuous>  meropenem  IVPB 500 milliGRAM(s) IV Intermittent every 12 hours  niCARdipine Infusion 2 mG/Hr IV Continuous <Continuous>  norepinephrine Infusion 0.05 MICROgram(s)/kG/Min IV Continuous <Continuous>  pantoprazole  Injectable 40 milliGRAM(s) IV Push two times a day  polyethylene glycol 3350 17 Gram(s) Oral daily  propofol Infusion 17.94 MICROgram(s)/kG/Min IV Continuous <Continuous>  senna 2 Tablet(s) Oral at bedtime  sodium bicarbonate  Infusion 0.081 mEq/kG/Hr IV Continuous <Continuous>  sodium chloride 0.9%. 1000 milliLiter(s) IV Continuous <Continuous>    PRN Inpatient Medications  HYDROmorphone  Injectable 0.5 milliGRAM(s) IV Push every 4 hours PRN  sodium chloride 0.9% lock flush 10 milliLiter(s) IV Push every 1 hour PRN      REVIEW OF SYSTEMS  --------------------------------------------------------------------------------  Unable to be obtained      All other systems were reviewed and are negative, except as noted.    VITALS/PHYSICAL EXAM  --------------------------------------------------------------------------------  T(C): 2.9 (12-03-22 @ 08:00), Max: 37.7 (12-03-22 @ 08:00)  HR: 69 (12-03-22 @ 09:30) (64 - 75)  BP: --  RR: 14 (12-03-22 @ 09:30) (2 - 37)  SpO2: 100% (12-03-22 @ 09:30) (100% - 100%)  Wt(kg): --        12-02-22 @ 07:01  -  12-03-22 @ 07:00  --------------------------------------------------------  IN: 3795.3 mL / OUT: 3280 mL / NET: 515.3 mL    12-03-22 @ 07:01  -  12-03-22 @ 09:35  --------------------------------------------------------  IN: 153.6 mL / OUT: 275 mL / NET: -121.4 mL      Physical Exam:  	Gen: elderly male intubated  	HEENT: Anicteric  	Pulm: Fair entry, ECMO circuit+  	CV: S1S2+  	Abd: Soft, +BS  	Ext:  trace LE edema B/L  	Neuro: sedated              : De Dios cath+ with clear urine  	Skin: Warm and dry    LABS/STUDIES  --------------------------------------------------------------------------------              8.4    16.40 >-----------<  111      [12-03-22 @ 00:40]              25.5     152  |  115  |  92  ----------------------------<  118      [12-03-22 @ 00:39]  4.3   |  20  |  3.61        Ca     8.2     [12-03-22 @ 00:39]      Mg     2.4     [12-03-22 @ 00:39]      Phos  5.5     [12-03-22 @ 00:39]    TPro  6.3  /  Alb  3.6  /  TBili  1.0  /  DBili  x   /  AST  35  /  ALT  7   /  AlkPhos  131  [12-03-22 @ 00:39]    PT/INR: PT 16.1 , INR 1.38       [12-02-22 @ 00:24]  PTT: 45.4       [12-03-22 @ 00:39]          [12-03-22 @ 00:39]    Creatinine Trend:  SCr 3.61 [12-03 @ 00:39]  SCr 3.28 [12-02 @ 00:20]  SCr 3.01 [12-01 @ 01:08]  SCr 3.02 [11-30 @ 13:10]  SCr 3.10 [11-30 @ 00:43]    Urinalysis - [12-01-22 @ 15:51]      Color LIGHT BROWN / Appearance Clear / SG 1.012 / pH 6.0      Gluc Negative / Ketone Negative  / Bili Negative / Urobili 6 mg/dL       Blood Moderate / Protein 30 mg/dL / Leuk Est Small / Nitrite Negative      RBC 34 / WBC 6 / Hyaline 2 / Gran  / Sq Epi  / Non Sq Epi 1 / Bacteria Negative      Lipid: chol --, , HDL --, LDL --      [11-26-22 @ 20:11]       [facility] Division of Kidney Diseases & Hypertension  FOLLOW UP NOTE  [phone]--------------------------------------------------------------------------------  Chief Complaint:Acute pancreatitis without infection or necrosis    HPI:  63 y/o male with PMH of CABG, DM, HTN, HLD presenting as transfer from [place] for c/f STEMI. Course c/b gallstone pancreatitis leading to ARDS and shock & cardiac arrest now on [facility]. Had significant troponin elevation and his LVEF down to 8%. Pt was deemed to be too unstable to have an angiogram and potential PCI due to his co-morbidities & a new subdural bleed. Nephrology called for ERIC. SCr on arrival was 2.15; trended down to hector 1.6 on 11/25; slowly trended up & peaked to 3.95 11/28.     Patient seen & examined today, family at bedside. Pt is intubated, unable to obtain ROS. Patient is no longer receiving diuretics. Hypernatremia is worsening and creatinine also increased to 3.6. VCP of 4-6      PAST HISTORY  --------------------------------------------------------------------------------  No significant changes to PMH, PSH, FHx, SHx, unless otherwise noted    ALLERGIES & MEDICATIONS  --------------------------------------------------------------------------------  Allergies    No Known Allergies    Intolerances      Standing Inpatient Medications  argatroban Infusion 0.1 MICROgram(s)/kG/Min IV Continuous <Continuous>  artificial  tears Solution 1 Drop(s) Both EYES four times a day  atorvastatin 40 milliGRAM(s) Oral at bedtime  chlorhexidine 0.12% Liquid 15 milliLiter(s) Oral Mucosa every 12 hours  chlorhexidine 2% Cloths 1 Application(s) Topical <User Schedule>  dexMEDEtomidine Infusion 1.5 MICROgram(s)/kG/Hr IV Continuous <Continuous>  dextrose 50% Injectable 50 milliLiter(s) IV Push every 15 minutes  insulin regular Infusion 6 Unit(s)/Hr IV Continuous <Continuous>  meropenem  IVPB 500 milliGRAM(s) IV Intermittent every 12 hours  niCARdipine Infusion 2 mG/Hr IV Continuous <Continuous>  norepinephrine Infusion 0.05 MICROgram(s)/kG/Min IV Continuous <Continuous>  pantoprazole  Injectable 40 milliGRAM(s) IV Push two times a day  polyethylene glycol 3350 17 Gram(s) Oral daily  propofol Infusion 17.94 MICROgram(s)/kG/Min IV Continuous <Continuous>  senna 2 Tablet(s) Oral at bedtime  sodium bicarbonate  Infusion 0.081 mEq/kG/Hr IV Continuous <Continuous>  sodium chloride 0.9%. 1000 milliLiter(s) IV Continuous <Continuous>    PRN Inpatient Medications  HYDROmorphone  Injectable 0.5 milliGRAM(s) IV Push every 4 hours PRN  sodium chloride 0.9% lock flush 10 milliLiter(s) IV Push every 1 hour PRN      REVIEW OF SYSTEMS  --------------------------------------------------------------------------------  Unable to be obtained      All other systems were reviewed and are negative, except as noted.    VITALS/PHYSICAL EXAM  --------------------------------------------------------------------------------  T(C): 2.9 (12-03-22 @ 08:00), Max: 37.7 (12-03-22 @ 08:00)  HR: 69 (12-03-22 @ 09:30) (64 - 75)  BP: --  RR: 14 (12-03-22 @ 09:30) (2 - 37)  SpO2: 100% (12-03-22 @ 09:30) (100% - 100%)  Wt(kg): --        12-02-22 @ 07:01  -  12-03-22 @ 07:00  --------------------------------------------------------  IN: 3795.3 mL / OUT: 3280 mL / NET: 515.3 mL    12-03-22 @ 07:01  -  12-03-22 @ 09:35  --------------------------------------------------------  IN: 153.6 mL / OUT: 275 mL / NET: -121.4 mL      Physical Exam:  	Gen: elderly male intubated  	HEENT: Anicteric  	Pulm: Fair entry, ECMO circuit+  	CV: S1S2+  	Abd: Soft, +BS  	Ext:  trace LE edema B/L  	Neuro: sedated              : De Dios cath+ with clear urine  	Skin: Warm and dry    LABS/STUDIES  --------------------------------------------------------------------------------              8.4    16.40 >-----------<  111      [12-03-22 @ 00:40]              25.5     152  |  115  |  92  ----------------------------<  118      [12-03-22 @ 00:39]  4.3   |  20  |  3.61        Ca     8.2     [12-03-22 @ 00:39]      Mg     2.4     [12-03-22 @ 00:39]      Phos  5.5     [12-03-22 @ 00:39]    TPro  6.3  /  Alb  3.6  /  TBili  1.0  /  DBili  x   /  AST  35  /  ALT  7   /  AlkPhos  131  [12-03-22 @ 00:39]    PT/INR: PT 16.1 , INR 1.38       [12-02-22 @ 00:24]  PTT: 45.4       [12-03-22 @ 00:39]          [12-03-22 @ 00:39]    Creatinine Trend:  SCr 3.61 [12-03 @ 00:39]  SCr 3.28 [12-02 @ 00:20]  SCr 3.01 [12-01 @ 01:08]  SCr 3.02 [11-30 @ 13:10]  SCr 3.10 [11-30 @ 00:43]    Urinalysis - [12-01-22 @ 15:51]      Color LIGHT BROWN / Appearance Clear / SG 1.012 / pH 6.0      Gluc Negative / Ketone Negative  / Bili Negative / Urobili 6 mg/dL       Blood Moderate / Protein 30 mg/dL / Leuk Est Small / Nitrite Negative      RBC 34 / WBC 6 / Hyaline 2 / Gran  / Sq Epi  / Non Sq Epi 1 / Bacteria Negative      Lipid: chol --, , HDL --, LDL --      [11-26-22 @ 20:11]       [facility] Division of Kidney Diseases & Hypertension  FOLLOW UP NOTE  [phone]--------------------------------------------------------------------------------  Chief Complaint:Acute pancreatitis without infection or necrosis    HPI:  63 y/o male with PMH of CABG, DM, HTN, HLD presenting as transfer from [place] for c/f STEMI. Course c/b gallstone pancreatitis leading to ARDS and shock & cardiac arrest now on [facility] ECM[facility]. Had significant troponin elevation and his LVEF down to 8%. Pt was deemed to be too unstable to have an angiogram and potential PCI due to his co-morbidities & a new subdural bleed. Nephrology called for ERIC. SCr on arrival was 2.15; trended down to hector 1.6 on 11/25; slowly trended up & peaked to 3.95 11/28.     Patient seen & examined today, family at bedside. Pt is intubated, unable to obtain ROS. Patient is no longer receiving diuretics. Hypernatremia is worsening and creatinine also increased to 3.6. VCP of 4-6      PAST HISTORY  --------------------------------------------------------------------------------  No significant changes to PMH, PSH, FHx, SHx, unless otherwise noted    ALLERGIES & MEDICATIONS  --------------------------------------------------------------------------------  Allergies    No Known Allergies    Intolerances      Standing Inpatient Medications  argatroban Infusion 0.1 MICROgram(s)/kG/Min IV Continuous <Continuous>  artificial  tears Solution 1 Drop(s) Both EYES four times a day  atorvastatin 40 milliGRAM(s) Oral at bedtime  chlorhexidine 0.12% Liquid 15 milliLiter(s) Oral Mucosa every 12 hours  chlorhexidine 2% Cloths 1 Application(s) Topical <User Schedule>  dexMEDEtomidine Infusion 1.5 MICROgram(s)/kG/Hr IV Continuous <Continuous>  dextrose 50% Injectable 50 milliLiter(s) IV Push every 15 minutes  insulin regular Infusion 6 Unit(s)/Hr IV Continuous <Continuous>  meropenem  IVPB 500 milliGRAM(s) IV Intermittent every 12 hours  niCARdipine Infusion 2 mG/Hr IV Continuous <Continuous>  norepinephrine Infusion 0.05 MICROgram(s)/kG/Min IV Continuous <Continuous>  pantoprazole  Injectable 40 milliGRAM(s) IV Push two times a day  polyethylene glycol 3350 17 Gram(s) Oral daily  propofol Infusion 17.94 MICROgram(s)/kG/Min IV Continuous <Continuous>  senna 2 Tablet(s) Oral at bedtime  sodium bicarbonate  Infusion 0.081 mEq/kG/Hr IV Continuous <Continuous>  sodium chloride 0.9%. 1000 milliLiter(s) IV Continuous <Continuous>    PRN Inpatient Medications  HYDROmorphone  Injectable 0.5 milliGRAM(s) IV Push every 4 hours PRN  sodium chloride 0.9% lock flush 10 milliLiter(s) IV Push every 1 hour PRN      REVIEW OF SYSTEMS  --------------------------------------------------------------------------------  Unable to be obtained      All other systems were reviewed and are negative, except as noted.    VITALS/PHYSICAL EXAM  --------------------------------------------------------------------------------  T(C): 2.9 (12-03-22 @ 08:00), Max: 37.7 (12-03-22 @ 08:00)  HR: 69 (12-03-22 @ 09:30) (64 - 75)  BP: --  RR: 14 (12-03-22 @ 09:30) (2 - 37)  SpO2: 100% (12-03-22 @ 09:30) (100% - 100%)  Wt(kg): --        12-02-22 @ 07:01  -  12-03-22 @ 07:00  --------------------------------------------------------  IN: 3795.3 mL / OUT: 3280 mL / NET: 515.3 mL    12-03-22 @ 07:01  -  12-03-22 @ 09:35  --------------------------------------------------------  IN: 153.6 mL / OUT: 275 mL / NET: -121.4 mL      Physical Exam:  	Gen: elderly male intubated  	HEENT: Anicteric  	Pulm: Fair entry, ECMO circuit+  	CV: S1S2+  	Abd: Soft, +BS  	Ext:  trace LE edema B/L  	Neuro: sedated              : De Dios cath+ with clear urine  	Skin: Warm and dry    LABS/STUDIES  --------------------------------------------------------------------------------              8.4    16.40 >-----------<  111      [12-03-22 @ 00:40]              25.5     152  |  115  |  92  ----------------------------<  118      [12-03-22 @ 00:39]  4.3   |  20  |  3.61        Ca     8.2     [12-03-22 @ 00:39]      Mg     2.4     [12-03-22 @ 00:39]      Phos  5.5     [12-03-22 @ 00:39]    TPro  6.3  /  Alb  3.6  /  TBili  1.0  /  DBili  x   /  AST  35  /  ALT  7   /  AlkPhos  131  [12-03-22 @ 00:39]    PT/INR: PT 16.1 , INR 1.38       [12-02-22 @ 00:24]  PTT: 45.4       [12-03-22 @ 00:39]          [12-03-22 @ 00:39]    Creatinine Trend:  SCr 3.61 [12-03 @ 00:39]  SCr 3.28 [12-02 @ 00:20]  SCr 3.01 [12-01 @ 01:08]  SCr 3.02 [11-30 @ 13:10]  SCr 3.10 [11-30 @ 00:43]    Urinalysis - [12-01-22 @ 15:51]      Color LIGHT BROWN / Appearance Clear / SG 1.012 / pH 6.0      Gluc Negative / Ketone Negative  / Bili Negative / Urobili 6 mg/dL       Blood Moderate / Protein 30 mg/dL / Leuk Est Small / Nitrite Negative      RBC 34 / WBC 6 / Hyaline 2 / Gran  / Sq Epi  / Non Sq Epi 1 / Bacteria Negative      Lipid: chol --, , HDL --, LDL --      [11-26-22 @ 20:11]

## 2022-12-03 NOTE — PROGRESS NOTE ADULT - ATTENDING COMMENTS
# Anthony/ATn - serum creatinine odalys from 3.6 to 3.2. LIkely volume depleted. Agree with holding loop diuretics. Continue to monitor labs off of diuretics    # Hypernatremia - increase water flushes.     # Medication toxicity monitoring: medication dose reviewed. Please dose medications to CrCl<15    The rest of the recommendations as per fellow's note.    Raquel Urena MD  Attending Nephrologist  [phone] or via [employer] # Anthony/ATn - serum creatinine odalys from 3.6 to 3.2. LIkely volume depleted. Agree with holding loop diuretics. Continue to monitor labs off of diuretics    # Hypernatremia - increase water flushes.     # Medication toxicity monitoring: medication dose reviewed. Please dose medications to CrCl<15    The rest of the recommendations as per fellow's note.    Raquel Urena MD  Attending Nephrologist  [phone] or via [employer] # Anthony/ATn - serum creatinine odalys from 3.6 to 3.2. LIkely volume depleted. Agree with holding loop diuretics. Continue to monitor labs off of diuretics    # Hypernatremia - increase water flushes.     # Medication toxicity monitoring: medication dose reviewed. Please dose medications to CrCl<15    The rest of the recommendations as per fellow's note.    Raquel Urena MD  Attending Nephrologist  [phone] or via [employer]

## 2022-12-03 NOTE — PROGRESS NOTE ADULT - SUBJECTIVE AND OBJECTIVE BOX
HPI:  Patient is a 63 y/o male with PMH of CABG, DM, HTN, HLD presenting as transfer from [place] for c/f STEMI. PTA arrival received 325 aspirin, 600 plavix, and no heparin drip started. Around 1:30 am patient had multiple episodes of non-bloody diarrhea and emesis with associated abdominal pain and chest pain, unable to localize, non-radiating, with associated SOB and no associated palpitations or diaphoresis. No recent fevers/chills, cough, rashes, or changes in urination. Does report mild diffuse back pain; started 5 days ago in setting on injury while walking and lifting objects. Denies focal weakness, numbness/tingling, or LOC due does report mild dizziness.    Patient seen and examined in ED. Hemodynamically stable, alert and awake, A&Ox3. Daughter at bedside. Reports abdominal pain, and nausea. Abdomen is soft, tender in epigastric area and RUQ. Denies chest pain, SOB. WBC 20, T.bili 3.4, Lipase 300. RUQ US demonstrated gallbladder stones, pericholecystic fluid.  (24 Nov 2022 15:10)      Patient seen and examined at the bedside.    Remained critically ill on continuous ICU monitoring.    OBJECTIVE:  Vital Signs Last 24 Hrs  T(C): 37.1 (03 Dec 2022 04:00), Max: 37.2 (02 Dec 2022 16:00)  T(F): 98.8 (03 Dec 2022 04:00), Max: 99 (02 Dec 2022 16:00)  HR: 67 (03 Dec 2022 05:45) (64 - 72)  BP: --  BP(mean): --  RR: 14 (03 Dec 2022 05:45) (2 - 37)  SpO2: 100% (03 Dec 2022 05:45) (100% - 100%)    Parameters below as of 03 Dec 2022 04:00  Patient On (Oxygen Delivery Method): ventilator    O2 Concentration (%): 40    Physical Exam:   General: Intubated, multiple lines gtt  Neurology: sedated   Eyes: bilateral pupils equal and reactive   ENT/Neck: +ETT midline, Neck supple, trachea midline, No JVD   Respiratory: rhonchi bilaterally   CV: S1S2, no murmurs        [x] Sinus rhythm  Abdominal: Soft, NT, ND +BS   Extremities: 1-2+ pedal edema noted, + peripheral pulses   Skin: No Rashes, Hematoma, Ecchymosis                         Assessment:   63 y/o male with PMH of CABG, DM, HTN, HLD presenting as transfer from [place] for c/f STEMI. PTA arrival received 325 aspirin, 600 plavix, and no heparin drip started. Around 1:30 am patient had multiple episodes of non-bloody diarrhea and emesis with associated abdominal pain and chest pain, unable to localize, non-radiating, with associated SOB and no associated palpitations or diaphoresis. No recent fevers/chills, cough, rashes, or changes in urination. Does report mild diffuse back pain; started 5 days ago in setting on injury while walking and lifting objects. Denies focal weakness, numbness/tingling, or LOC due does report mild dizziness.    Cardiac arrest s/p [facility] ECMO 11/25  Hemodynamic instability  Hypovolemia  Post op respiratory insufficiency  Thrombocytopenia  Acute blood loss anemia  Leukocytosis     Plan:   ***Neuro***   Addressed analgesic regimen to optimize function and sedated with Propofol and Precedex for ventilatory synchrony.  CT head showed 4mm subdural hematoma 11/26: repeat CT head unchanged     ***Cardiovascular***  IV Levophed infusion for cardiogenic shock and IV Cardene for rate control.  Invasive hemodynamic monitoring with a PA catheter & an A-line were required for the following of serial CI's/MV02's and continuous central venous, pulmonary artery pressure and MAP/BP monitoring to ensure adequate cardiovascular support. Lipitor was also continued for long term graft patency. Patient requires VA ECMO s/p arrest.       ***Pulmonary***   Respiratory status required full ventilatory support, close monitoring of respiratory rate and breathing pattern, the following of ABG’s with A-line monitoring, continuous pulse oximetry monitoring. Patient is tolerating 12 ppm of Nitric Oxide. There is an increased concern for atelectasis due to obesity related restrictive lung disease.     Mode: AC/ CMV (Assist Control/ Continuous Mandatory Ventilation)  RR (machine): 14  FiO2: 40  PEEP: 12  ITime: 1  MAP: 16  PC: 10  PIP: 22               ***Heme***  Anemia due to acute blood loss, no current plan for transfusion. As well as consumptive thrombocytopenia. Continue to monitor hemoglobin/hematocrit and platelet levels.  Continue anticoagulation therapy with IV Argatroban infusion for ECMO, titrate for PTT goal 40-45           ***GI***  Protonix for stress ulcer prophylaxis. Continue bowel regimen with Miralax and Senna. Right upper quadrant sono from 12/1 revealed Cholestasis w/ thickening of GB wall, trace pericholecystic fluid, right pleural effusion.    ***Renal***  Optimize renal perfusion with adequate volume resuscitation and continued monitoring of urine output, fluid balance, electrolytes, and BUN/Creatinine.          ***ID***  Afebrile, white blood cells elevated to 16.3. Continue trending white blood count and monitoring fever curve. Meropenem for elevated white blood cells.       ***Endocrine***  Metabolic stability, history of diabetes mellitus 2 required an IV regular Insulin drip while following serial glucose levels to help achieve and maintain euglycemia.        Patient requires continuous monitoring with bedside rhythm monitoring, pulse oximetry monitoring, and continuous central venous, pulmonary artery pressure and arterial pressure monitoring ; and intermittent blood gas analysis. Care plan discussed with the ICU care team.   Patient remained critical, at risk for life threatening decompensation.    I have spent 30 minutes providing critical care management to this patient.    By signing my name below, I, Leela Avila, attest that this documentation has been prepared under the direction and in the presence of Dinorah Rubio MD   Electronically signed: Brian Oakes, 12-03-22 @ 06:08    IDinorah, personally performed the services described in this documentation. all medical record entries made by the karlaibe were at my direction and in my presence. I have reviewed the chart and agree that the record reflects my personal performance and is accurate and complete  Electronically signed: Dinorah Rubio MD  HPI:  Patient is a 61 y/o male with PMH of CABG, DM, HTN, HLD presenting as transfer from [place] for c/f STEMI. PTA arrival received 325 aspirin, 600 plavix, and no heparin drip started. Around 1:30 am patient had multiple episodes of non-bloody diarrhea and emesis with associated abdominal pain and chest pain, unable to localize, non-radiating, with associated SOB and no associated palpitations or diaphoresis. No recent fevers/chills, cough, rashes, or changes in urination. Does report mild diffuse back pain; started 5 days ago in setting on injury while walking and lifting objects. Denies focal weakness, numbness/tingling, or LOC due does report mild dizziness.    Patient seen and examined in ED. Hemodynamically stable, alert and awake, A&Ox3. Daughter at bedside. Reports abdominal pain, and nausea. Abdomen is soft, tender in epigastric area and RUQ. Denies chest pain, SOB. WBC 20, T.bili 3.4, Lipase 300. RUQ US demonstrated gallbladder stones, pericholecystic fluid.  (24 Nov 2022 15:10)      Patient seen and examined at the bedside.    Remained critically ill on continuous ICU monitoring.    OBJECTIVE:  Vital Signs Last 24 Hrs  T(C): 37.1 (03 Dec 2022 04:00), Max: 37.2 (02 Dec 2022 16:00)  T(F): 98.8 (03 Dec 2022 04:00), Max: 99 (02 Dec 2022 16:00)  HR: 67 (03 Dec 2022 05:45) (64 - 72)  BP: --  BP(mean): --  RR: 14 (03 Dec 2022 05:45) (2 - 37)  SpO2: 100% (03 Dec 2022 05:45) (100% - 100%)    Parameters below as of 03 Dec 2022 04:00  Patient On (Oxygen Delivery Method): ventilator    O2 Concentration (%): 40    Physical Exam:   General: Intubated, multiple lines gtt  Neurology: sedated   Eyes: bilateral pupils equal and reactive   ENT/Neck: +ETT midline, Neck supple, trachea midline, No JVD   Respiratory: rhonchi bilaterally   CV: S1S2, no murmurs        [x] Sinus rhythm  Abdominal: Soft, NT, ND +BS   Extremities: 1-2+ pedal edema noted, + peripheral pulses   Skin: No Rashes, Hematoma, Ecchymosis                         Assessment:   61 y/o male with PMH of CABG, DM, HTN, HLD presenting as transfer from [place] for c/f STEMI. PTA arrival received 325 aspirin, 600 plavix, and no heparin drip started. Around 1:30 am patient had multiple episodes of non-bloody diarrhea and emesis with associated abdominal pain and chest pain, unable to localize, non-radiating, with associated SOB and no associated palpitations or diaphoresis. No recent fevers/chills, cough, rashes, or changes in urination. Does report mild diffuse back pain; started 5 days ago in setting on injury while walking and lifting objects. Denies focal weakness, numbness/tingling, or LOC due does report mild dizziness.    Cardiac arrest s/p [facility] ECMO 11/25  Hemodynamic instability  Hypovolemia  Post op respiratory insufficiency  Thrombocytopenia  Acute blood loss anemia  Leukocytosis     Plan:   ***Neuro***   Addressed analgesic regimen to optimize function and sedated with Propofol and Precedex for ventilatory synchrony.  CT head showed 4mm subdural hematoma 11/26: repeat CT head unchanged     ***Cardiovascular***  IV Levophed infusion for cardiogenic shock and IV Cardene for rate control.  Invasive hemodynamic monitoring with a PA catheter & an A-line were required for the following of serial CI's/MV02's and continuous central venous, pulmonary artery pressure and MAP/BP monitoring to ensure adequate cardiovascular support. Lipitor was also continued for long term graft patency. Patient requires VA ECMO s/p arrest.       ***Pulmonary***   Respiratory status required full ventilatory support, close monitoring of respiratory rate and breathing pattern, the following of ABG’s with A-line monitoring, continuous pulse oximetry monitoring. Patient is tolerating 12 ppm of Nitric Oxide. There is an increased concern for atelectasis due to obesity related restrictive lung disease.     Mode: AC/ CMV (Assist Control/ Continuous Mandatory Ventilation)  RR (machine): 14  FiO2: 40  PEEP: 12  ITime: 1  MAP: 16  PC: 10  PIP: 22               ***Heme***  Anemia due to acute blood loss, no current plan for transfusion. As well as consumptive thrombocytopenia. Continue to monitor hemoglobin/hematocrit and platelet levels.  Continue anticoagulation therapy with IV Argatroban infusion for ECMO, titrate for PTT goal 40-45           ***GI***  Protonix for stress ulcer prophylaxis. Continue bowel regimen with Miralax and Senna. Right upper quadrant sono from 12/1 revealed Cholestasis w/ thickening of GB wall, trace pericholecystic fluid, right pleural effusion.    ***Renal***  Optimize renal perfusion with adequate volume resuscitation and continued monitoring of urine output, fluid balance, electrolytes, and BUN/Creatinine.          ***ID***  Afebrile, white blood cells elevated to 16.3. Continue trending white blood count and monitoring fever curve. Meropenem for elevated white blood cells.       ***Endocrine***  Metabolic stability, history of diabetes mellitus 2 required an IV regular Insulin drip while following serial glucose levels to help achieve and maintain euglycemia.        Patient requires continuous monitoring with bedside rhythm monitoring, pulse oximetry monitoring, and continuous central venous, pulmonary artery pressure and arterial pressure monitoring ; and intermittent blood gas analysis. Care plan discussed with the ICU care team.   Patient remained critical, at risk for life threatening decompensation.    I have spent 30 minutes providing critical care management to this patient.    By signing my name below, I, Leela Avila, attest that this documentation has been prepared under the direction and in the presence of Dinorah Rubio MD   Electronically signed: Brian Oakes, 12-03-22 @ 06:08    IDinorah, personally performed the services described in this documentation. all medical record entries made by the karlaibe were at my direction and in my presence. I have reviewed the chart and agree that the record reflects my personal performance and is accurate and complete  Electronically signed: Dinorah Rubio MD  HPI:  Patient is a 61 y/o male with PMH of CABG, DM, HTN, HLD presenting as transfer from [place] for c/f STEMI. PTA arrival received 325 aspirin, 600 plavix, and no heparin drip started. Around 1:30 am patient had multiple episodes of non-bloody diarrhea and emesis with associated abdominal pain and chest pain, unable to localize, non-radiating, with associated SOB and no associated palpitations or diaphoresis. No recent fevers/chills, cough, rashes, or changes in urination. Does report mild diffuse back pain; started 5 days ago in setting on injury while walking and lifting objects. Denies focal weakness, numbness/tingling, or LOC due does report mild dizziness.    Patient seen and examined in ED. Hemodynamically stable, alert and awake, A&Ox3. Daughter at bedside. Reports abdominal pain, and nausea. Abdomen is soft, tender in epigastric area and RUQ. Denies chest pain, SOB. WBC 20, T.bili 3.4, Lipase 300. RUQ US demonstrated gallbladder stones, pericholecystic fluid.  (24 Nov 2022 15:10)      Patient seen and examined at the bedside.    Remained critically ill on continuous ICU monitoring.    OBJECTIVE:  Vital Signs Last 24 Hrs  T(C): 37.1 (03 Dec 2022 04:00), Max: 37.2 (02 Dec 2022 16:00)  T(F): 98.8 (03 Dec 2022 04:00), Max: 99 (02 Dec 2022 16:00)  HR: 67 (03 Dec 2022 05:45) (64 - 72)  BP: --  BP(mean): --  RR: 14 (03 Dec 2022 05:45) (2 - 37)  SpO2: 100% (03 Dec 2022 05:45) (100% - 100%)    Parameters below as of 03 Dec 2022 04:00  Patient On (Oxygen Delivery Method): ventilator    O2 Concentration (%): 40    Physical Exam:   General: Intubated, multiple lines gtt  Neurology: sedated   Eyes: bilateral pupils equal and reactive   ENT/Neck: +ETT midline, Neck supple, trachea midline, No JVD   Respiratory: rhonchi bilaterally   CV: S1S2, no murmurs        [x] Sinus rhythm  Abdominal: Soft, NT, ND +BS   Extremities: 1-2+ pedal edema noted, + peripheral pulses   Skin: No Rashes, Hematoma, Ecchymosis                         Assessment:   61 y/o male with PMH of CABG, DM, HTN, HLD presenting as transfer from [place] for c/f STEMI. PTA arrival received 325 aspirin, 600 plavix, and no heparin drip started. Around 1:30 am patient had multiple episodes of non-bloody diarrhea and emesis with associated abdominal pain and chest pain, unable to localize, non-radiating, with associated SOB and no associated palpitations or diaphoresis. No recent fevers/chills, cough, rashes, or changes in urination. Does report mild diffuse back pain; started 5 days ago in setting on injury while walking and lifting objects. Denies focal weakness, numbness/tingling, or LOC due does report mild dizziness.    Cardiac arrest s/p [facility] ECMO 11/25  Hemodynamic instability  Hypovolemia  Post op respiratory insufficiency  Thrombocytopenia  Acute blood loss anemia  Leukocytosis     Plan:   ***Neuro***   Addressed analgesic regimen to optimize function and sedated with Propofol and Precedex for ventilatory synchrony.  CT head showed 4mm subdural hematoma 11/26: repeat CT head unchanged     ***Cardiovascular***  IV Levophed infusion for cardiogenic shock and IV Cardene for rate control.  Invasive hemodynamic monitoring with a PA catheter & an A-line were required for the following of serial CI's/MV02's and continuous central venous, pulmonary artery pressure and MAP/BP monitoring to ensure adequate cardiovascular support. Lipitor was also continued for long term graft patency. Patient requires [facility] ECMO s/p arrest.       ***Pulmonary***   Respiratory status required full ventilatory support, close monitoring of respiratory rate and breathing pattern, the following of ABG’s with A-line monitoring, continuous pulse oximetry monitoring. Patient is tolerating 12 ppm of Nitric Oxide. There is an increased concern for atelectasis due to obesity related restrictive lung disease.     Mode: AC/ CMV (Assist Control/ Continuous Mandatory Ventilation)  RR (machine): 14  FiO2: 40  PEEP: 12  ITime: 1  MAP: 16  PC: 10  PIP: 22               ***Heme***  Anemia due to acute blood loss, no current plan for transfusion. As well as consumptive thrombocytopenia. Continue to monitor hemoglobin/hematocrit and platelet levels.  Continue anticoagulation therapy with IV Argatroban infusion for ECMO, titrate for PTT goal 40-45           ***GI***  Protonix for stress ulcer prophylaxis. Continue bowel regimen with Miralax and Senna. Right upper quadrant sono from 12/1 revealed Cholestasis w/ thickening of GB wall, trace pericholecystic fluid, right pleural effusion.    ***Renal***  Optimize renal perfusion with adequate volume resuscitation and continued monitoring of urine output, fluid balance, electrolytes, and BUN/Creatinine.          ***ID***  Afebrile, white blood cells elevated to 16.3. Continue trending white blood count and monitoring fever curve. Meropenem for elevated white blood cells.       ***Endocrine***  Metabolic stability, history of diabetes mellitus 2 required an IV regular Insulin drip while following serial glucose levels to help achieve and maintain euglycemia.        Patient requires continuous monitoring with bedside rhythm monitoring, pulse oximetry monitoring, and continuous central venous, pulmonary artery pressure and arterial pressure monitoring ; and intermittent blood gas analysis. Care plan discussed with the ICU care team.   Patient remained critical, at risk for life threatening decompensation.    I have spent 30 minutes providing critical care management to this patient.    By signing my name below, I, Leela Avila, attest that this documentation has been prepared under the direction and in the presence of Dinorah Rubio MD   Electronically signed: Brian Oakes, 12-03-22 @ 06:08    IDinorah, personally performed the services described in this documentation. all medical record entries made by the karlaibe were at my direction and in my presence. I have reviewed the chart and agree that the record reflects my personal performance and is accurate and complete  Electronically signed: Dinorah Rubio MD  HPI:  Patient is a 61 y/o male with PMH of CABG, DM, HTN, HLD presenting as transfer from [place] for c/f STEMI. PTA arrival received 325 aspirin, 600 plavix, and no heparin drip started. Around 1:30 am patient had multiple episodes of non-bloody diarrhea and emesis with associated abdominal pain and chest pain, unable to localize, non-radiating, with associated SOB and no associated palpitations or diaphoresis. No recent fevers/chills, cough, rashes, or changes in urination. Does report mild diffuse back pain; started 5 days ago in setting on injury while walking and lifting objects. Denies focal weakness, numbness/tingling, or LOC due does report mild dizziness.    Patient seen and examined in ED. Hemodynamically stable, alert and awake, A&Ox3. Daughter at bedside. Reports abdominal pain, and nausea. Abdomen is soft, tender in epigastric area and RUQ. Denies chest pain, SOB. WBC 20, T.bili 3.4, Lipase 300. RUQ US demonstrated gallbladder stones, pericholecystic fluid.  (24 Nov 2022 15:10)      Patient seen and examined at the bedside.    Remained critically ill on continuous ICU monitoring.    OBJECTIVE:  Vital Signs Last 24 Hrs  T(C): 37.1 (03 Dec 2022 04:00), Max: 37.2 (02 Dec 2022 16:00)  T(F): 98.8 (03 Dec 2022 04:00), Max: 99 (02 Dec 2022 16:00)  HR: 67 (03 Dec 2022 05:45) (64 - 72)  BP: --  BP(mean): --  RR: 14 (03 Dec 2022 05:45) (2 - 37)  SpO2: 100% (03 Dec 2022 05:45) (100% - 100%)    Parameters below as of 03 Dec 2022 04:00  Patient On (Oxygen Delivery Method): ventilator    O2 Concentration (%): 40    Physical Exam:   General: Intubated, multiple lines gtt  Neurology: sedated   Eyes: bilateral pupils equal and reactive   ENT/Neck: +ETT midline, Neck supple, trachea midline, No JVD   Respiratory: rhonchi bilaterally   CV: S1S2, no murmurs        [x] Sinus rhythm  Abdominal: Soft, NT, ND +BS   Extremities: 1-2+ pedal edema noted, + peripheral pulses   Skin: No Rashes, Hematoma, Ecchymosis                         Assessment:   61 y/o male with PMH of CABG, DM, HTN, HLD presenting as transfer from [place] for c/f STEMI. PTA arrival received 325 aspirin, 600 plavix, and no heparin drip started. Around 1:30 am patient had multiple episodes of non-bloody diarrhea and emesis with associated abdominal pain and chest pain, unable to localize, non-radiating, with associated SOB and no associated palpitations or diaphoresis. No recent fevers/chills, cough, rashes, or changes in urination. Does report mild diffuse back pain; started 5 days ago in setting on injury while walking and lifting objects. Denies focal weakness, numbness/tingling, or LOC due does report mild dizziness.    Cardiac arrest s/p [facility] ECMO 11/25  Hemodynamic instability  Hypovolemia  Post op respiratory insufficiency  Thrombocytopenia  Acute blood loss anemia  Leukocytosis     Plan:   ***Neuro***   Addressed analgesic regimen to optimize function and sedated with Propofol and Precedex for ventilatory synchrony.  CT head showed 4mm subdural hematoma 11/26: repeat CT head unchanged     ***Cardiovascular***  IV Levophed infusion for cardiogenic shock and IV Cardene for rate control.  Invasive hemodynamic monitoring with a PA catheter & an A-line were required for the following of serial CI's/MV02's and continuous central venous, pulmonary artery pressure and MAP/BP monitoring to ensure adequate cardiovascular support. Lipitor was also continued for long term graft patency. Patient requires VA ECMO s/p arrest.       ***Pulmonary***   Respiratory status required full ventilatory support, close monitoring of respiratory rate and breathing pattern, the following of ABG’s with A-line monitoring, continuous pulse oximetry monitoring. Patient is tolerating 12 ppm of Nitric Oxide, plan to wean it off today. There is an increased concern for atelectasis due to obesity related restrictive lung disease.     Mode: AC/ CMV (Assist Control/ Continuous Mandatory Ventilation)  RR (machine): 14  FiO2: 40  PEEP: 12  ITime: 1  MAP: 16  PC: 10  PIP: 22               ***Heme***  Anemia due to acute blood loss, no current plan for transfusion. As well as consumptive thrombocytopenia. Continue to monitor hemoglobin/hematocrit and platelet levels.  Continue anticoagulation therapy with IV Argatroban infusion for ECMO, titrate for PTT goal 40-45           ***GI***  Protonix for stress ulcer prophylaxis. Continue bowel regimen with Miralax and Senna. Right upper quadrant sono from 12/1 revealed Cholestasis w/ thickening of GB wall, trace pericholecystic fluid, right pleural effusion.    ***Renal***  Optimize renal perfusion with adequate volume resuscitation and continued monitoring of urine output, fluid balance, electrolytes, and BUN/Creatinine.          ***ID***  Afebrile, white blood cells elevated to 16.3. Continue trending white blood count and monitoring fever curve. Meropenem for elevated white blood cells.       ***Endocrine***  Metabolic stability, history of diabetes mellitus 2 required an IV regular Insulin drip while following serial glucose levels to help achieve and maintain euglycemia.        Patient requires continuous monitoring with bedside rhythm monitoring, pulse oximetry monitoring, and continuous central venous, pulmonary artery pressure and arterial pressure monitoring ; and intermittent blood gas analysis. Care plan discussed with the ICU care team.   Patient remained critical, at risk for life threatening decompensation.    I have spent 30 minutes providing critical care management to this patient.    By signing my name below, I, Leela Avila, attest that this documentation has been prepared under the direction and in the presence of Dinorah Rubio MD   Electronically signed: Brian Oakes, 12-03-22 @ 06:08    IDinorah, personally performed the services described in this documentation. all medical record entries made by the scribe were at my direction and in my presence. I have reviewed the chart and agree that the record reflects my personal performance and is accurate and complete  Electronically signed: Dinorah Rubio MD  HPI:  Patient is a 61 y/o male with PMH of CABG, DM, HTN, HLD presenting as transfer from [place] for c/f STEMI. PTA arrival received 325 aspirin, 600 plavix, and no heparin drip started. Around 1:30 am patient had multiple episodes of non-bloody diarrhea and emesis with associated abdominal pain and chest pain, unable to localize, non-radiating, with associated SOB and no associated palpitations or diaphoresis. No recent fevers/chills, cough, rashes, or changes in urination. Does report mild diffuse back pain; started 5 days ago in setting on injury while walking and lifting objects. Denies focal weakness, numbness/tingling, or LOC due does report mild dizziness.    Patient seen and examined in ED. Hemodynamically stable, alert and awake, A&Ox3. Daughter at bedside. Reports abdominal pain, and nausea. Abdomen is soft, tender in epigastric area and RUQ. Denies chest pain, SOB. WBC 20, T.bili 3.4, Lipase 300. RUQ US demonstrated gallbladder stones, pericholecystic fluid.  (24 Nov 2022 15:10)      Patient seen and examined at the bedside.    Remained critically ill on continuous ICU monitoring.    OBJECTIVE:  Vital Signs Last 24 Hrs  T(C): 37.1 (03 Dec 2022 04:00), Max: 37.2 (02 Dec 2022 16:00)  T(F): 98.8 (03 Dec 2022 04:00), Max: 99 (02 Dec 2022 16:00)  HR: 67 (03 Dec 2022 05:45) (64 - 72)  BP: --  BP(mean): --  RR: 14 (03 Dec 2022 05:45) (2 - 37)  SpO2: 100% (03 Dec 2022 05:45) (100% - 100%)    Parameters below as of 03 Dec 2022 04:00  Patient On (Oxygen Delivery Method): ventilator    O2 Concentration (%): 40    Physical Exam:   General: Intubated, multiple lines gtt  Neurology: sedated   Eyes: bilateral pupils equal and reactive   ENT/Neck: +ETT midline, Neck supple, trachea midline, No JVD   Respiratory: rhonchi bilaterally   CV: S1S2, no murmurs        [x] Sinus rhythm  Abdominal: Soft, NT, ND +BS   Extremities: 1-2+ pedal edema noted, + peripheral pulses   Skin: No Rashes, Hematoma, Ecchymosis                         Assessment:   61 y/o male with PMH of CABG, DM, HTN, HLD presenting as transfer from [place] for c/f STEMI. PTA arrival received 325 aspirin, 600 plavix, and no heparin drip started. Around 1:30 am patient had multiple episodes of non-bloody diarrhea and emesis with associated abdominal pain and chest pain, unable to localize, non-radiating, with associated SOB and no associated palpitations or diaphoresis. No recent fevers/chills, cough, rashes, or changes in urination. Does report mild diffuse back pain; started 5 days ago in setting on injury while walking and lifting objects. Denies focal weakness, numbness/tingling, or LOC due does report mild dizziness.    Cardiac arrest s/p VA ECMO 11/25  Hemodynamic instability  Hypovolemia  Post op respiratory insufficiency  Thrombocytopenia  Acute blood loss anemia  Leukocytosis     Plan:   ***Neuro***   Addressed analgesic regimen to optimize function and sedated with Propofol and Precedex for ventilatory synchrony.  CT head showed 4mm subdural hematoma 11/26: repeat CT head unchanged     ***Cardiovascular***  IV Levophed infusion for cardiogenic shock and IV Cardene for rate control.  Invasive hemodynamic monitoring with a PA catheter & an A-line were required for the following of serial CI's/MV02's and continuous central venous, pulmonary artery pressure and MAP/BP monitoring to ensure adequate cardiovascular support. Lipitor was also continued for long term graft patency. Patient requires VA ECMO s/p arrest.       ***Pulmonary***   Respiratory status required full ventilatory support, close monitoring of respiratory rate and breathing pattern, the following of ABG’s with A-line monitoring, continuous pulse oximetry monitoring. Patient is tolerating 12 ppm of Nitric Oxide, plan to wean it off today. There is an increased concern for atelectasis due to obesity related restrictive lung disease.     Mode: AC/ CMV (Assist Control/ Continuous Mandatory Ventilation)  RR (machine): 14  FiO2: 40  PEEP: 12  ITime: 1  MAP: 16  PC: 10  PIP: 22               ***Heme***  Anemia due to acute blood loss, no current plan for transfusion. As well as consumptive thrombocytopenia. Continue to monitor hemoglobin/hematocrit and platelet levels.  Continue anticoagulation therapy with IV Argatroban infusion for ECMO, titrate for PTT goal 40-45           ***GI***  Protonix for stress ulcer prophylaxis. Continue bowel regimen with Miralax and Senna. Right upper quadrant sono from 12/1 revealed Cholestasis w/ thickening of GB wall, trace pericholecystic fluid, right pleural effusion.    ***Renal***  Optimize renal perfusion with adequate volume resuscitation and continued monitoring of urine output, fluid balance, electrolytes, and BUN/Creatinine.          ***ID***  Afebrile, white blood cells elevated to 16.3. Continue trending white blood count and monitoring fever curve. Meropenem for elevated white blood cells.       ***Endocrine***  Metabolic stability, history of diabetes mellitus 2 required an IV regular Insulin drip while following serial glucose levels to help achieve and maintain euglycemia.        Patient requires continuous monitoring with bedside rhythm monitoring, pulse oximetry monitoring, and continuous central venous, pulmonary artery pressure and arterial pressure monitoring ; and intermittent blood gas analysis. Care plan discussed with the ICU care team.   Patient remained critical, at risk for life threatening decompensation.    I have spent 30 minutes providing critical care management to this patient.    By signing my name below, I, Leela Avila, attest that this documentation has been prepared under the direction and in the presence of Dinorah Rubio MD   Electronically signed: Brian Oakes, 12-03-22 @ 06:08    IDinorah, personally performed the services described in this documentation. all medical record entries made by the scribe were at my direction and in my presence. I have reviewed the chart and agree that the record reflects my personal performance and is accurate and complete  Electronically signed: Dinorah Rubio MD  HPI:  Patient is a 63 y/o male with PMH of CABG, DM, HTN, HLD presenting as transfer from [place] for c/f STEMI. PTA arrival received 325 aspirin, 600 plavix, and no heparin drip started. Around 1:30 am patient had multiple episodes of non-bloody diarrhea and emesis with associated abdominal pain and chest pain, unable to localize, non-radiating, with associated SOB and no associated palpitations or diaphoresis. No recent fevers/chills, cough, rashes, or changes in urination. Does report mild diffuse back pain; started 5 days ago in setting on injury while walking and lifting objects. Denies focal weakness, numbness/tingling, or LOC due does report mild dizziness.    Patient seen and examined in ED. Hemodynamically stable, alert and awake, A&Ox3. Daughter at bedside. Reports abdominal pain, and nausea. Abdomen is soft, tender in epigastric area and RUQ. Denies chest pain, SOB. WBC 20, T.bili 3.4, Lipase 300. RUQ US demonstrated gallbladder stones, pericholecystic fluid.  (24 Nov 2022 15:10)      Patient seen and examined at the bedside.    Remained critically ill on continuous ICU monitoring.    OBJECTIVE:  Vital Signs Last 24 Hrs  T(C): 37.1 (03 Dec 2022 04:00), Max: 37.2 (02 Dec 2022 16:00)  T(F): 98.8 (03 Dec 2022 04:00), Max: 99 (02 Dec 2022 16:00)  HR: 67 (03 Dec 2022 05:45) (64 - 72)  BP: --  BP(mean): --  RR: 14 (03 Dec 2022 05:45) (2 - 37)  SpO2: 100% (03 Dec 2022 05:45) (100% - 100%)    Parameters below as of 03 Dec 2022 04:00  Patient On (Oxygen Delivery Method): ventilator    O2 Concentration (%): 40    Physical Exam:   General: Intubated, multiple lines gtt  Neurology: sedated   Eyes: bilateral pupils equal and reactive   ENT/Neck: +ETT midline, Neck supple, trachea midline, No JVD   Respiratory: rhonchi bilaterally   CV: S1S2, no murmurs        [x] Sinus rhythm  Abdominal: Soft, NT, ND +BS   Extremities: 1-2+ pedal edema noted, + peripheral pulses   Skin: No Rashes, Hematoma, Ecchymosis                         Assessment:   63 y/o male with PMH of CABG, DM, HTN, HLD presenting as transfer from [place] for c/f STEMI. PTA arrival received 325 aspirin, 600 plavix, and no heparin drip started. Around 1:30 am patient had multiple episodes of non-bloody diarrhea and emesis with associated abdominal pain and chest pain, unable to localize, non-radiating, with associated SOB and no associated palpitations or diaphoresis. No recent fevers/chills, cough, rashes, or changes in urination. Does report mild diffuse back pain; started 5 days ago in setting on injury while walking and lifting objects. Denies focal weakness, numbness/tingling, or LOC due does report mild dizziness.    Cardiac arrest s/p VA ECMO 11/25  Hemodynamic instability  Hypovolemia  Post op respiratory insufficiency  Thrombocytopenia  Acute blood loss anemia  Leukocytosis     Plan:   ***Neuro***   Addressed analgesic regimen to optimize function and sedated with Propofol and Precedex for ventilatory synchrony.  CT head showed 4mm subdural hematoma 11/26: repeat CT head unchanged     ***Cardiovascular***  IV Levophed infusion for cardiogenic shock and IV Cardene for rate control.  Invasive hemodynamic monitoring with a PA catheter & an A-line were required for the following of serial CI's/MV02's and continuous central venous, pulmonary artery pressure and MAP/BP monitoring to ensure adequate cardiovascular support. Lipitor was also continued for long term graft patency. Patient requires VA ECMO s/p arrest.       ***Pulmonary***   Respiratory status required full ventilatory support, close monitoring of respiratory rate and breathing pattern, the following of ABG’s with A-line monitoring, continuous pulse oximetry monitoring. Patient is tolerating 12 ppm of Nitric Oxide, plan to wean it off today. There is an increased concern for atelectasis due to obesity related restrictive lung disease.     Mode: AC/ CMV (Assist Control/ Continuous Mandatory Ventilation)  RR (machine): 14  FiO2: 40  PEEP: 12  ITime: 1  MAP: 16  PC: 10  PIP: 22               ***Heme***  Anemia due to acute blood loss, no current plan for transfusion. As well as consumptive thrombocytopenia. Continue to monitor hemoglobin/hematocrit and platelet levels.  Continue anticoagulation therapy with IV Argatroban infusion for ECMO, titrate for PTT goal 40-45           ***GI***  Protonix for stress ulcer prophylaxis. Continue bowel regimen with Miralax and Senna. Right upper quadrant sono from 12/1 revealed Cholestasis w/ thickening of GB wall, trace pericholecystic fluid, right pleural effusion.    ***Renal***  Optimize renal perfusion with adequate volume resuscitation and continued monitoring of urine output, fluid balance, electrolytes, and BUN/Creatinine.          ***ID***  Afebrile, white blood cells elevated to 16.3. Continue trending white blood count and monitoring fever curve. Meropenem for elevated white blood cells.       ***Endocrine***  Metabolic stability, history of diabetes mellitus 2 required an IV regular Insulin drip while following serial glucose levels to help achieve and maintain euglycemia.        Patient requires continuous monitoring with bedside rhythm monitoring, pulse oximetry monitoring, and continuous central venous, pulmonary artery pressure and arterial pressure monitoring ; and intermittent blood gas analysis. Care plan discussed with the ICU care team.   Patient remained critical, at risk for life threatening decompensation.    I have spent 30 minutes providing critical care management to this patient.    By signing my name below, I, Leela Avila, attest that this documentation has been prepared under the direction and in the presence of Dinorah Rubio MD   Electronically signed: Brian Oakes, 12-03-22 @ 06:08    IDinorah, personally performed the services described in this documentation. all medical record entries made by the scribe were at my direction and in my presence. I have reviewed the chart and agree that the record reflects my personal performance and is accurate and complete  Electronically signed: Dinorah Rubio MD  HPI:  Patient is a 63 y/o male with PMH of CABG, DM, HTN, HLD presenting as transfer from [place] for c/f STEMI. PTA arrival received 325 aspirin, 600 plavix, and no heparin drip started. Around 1:30 am patient had multiple episodes of non-bloody diarrhea and emesis with associated abdominal pain and chest pain, unable to localize, non-radiating, with associated SOB and no associated palpitations or diaphoresis. No recent fevers/chills, cough, rashes, or changes in urination. Does report mild diffuse back pain; started 5 days ago in setting on injury while walking and lifting objects. Denies focal weakness, numbness/tingling, or LOC due does report mild dizziness.    Patient seen and examined in ED. Hemodynamically stable, alert and awake, A&Ox3. Daughter at bedside. Reports abdominal pain, and nausea. Abdomen is soft, tender in epigastric area and RUQ. Denies chest pain, SOB. WBC 20, T.bili 3.4, Lipase 300. RUQ US demonstrated gallbladder stones, pericholecystic fluid.  (24 Nov 2022 15:10)      Patient seen and examined at the bedside.    Remained critically ill on continuous ICU monitoring.    OBJECTIVE:  Vital Signs Last 24 Hrs  T(C): 37.1 (03 Dec 2022 04:00), Max: 37.2 (02 Dec 2022 16:00)  T(F): 98.8 (03 Dec 2022 04:00), Max: 99 (02 Dec 2022 16:00)  HR: 67 (03 Dec 2022 05:45) (64 - 72)  BP: --  BP(mean): --  RR: 14 (03 Dec 2022 05:45) (2 - 37)  SpO2: 100% (03 Dec 2022 05:45) (100% - 100%)    Parameters below as of 03 Dec 2022 04:00  Patient On (Oxygen Delivery Method): ventilator    O2 Concentration (%): 40    Physical Exam:   General: Intubated, multiple lines gtt  Neurology: mildly sedated, but able to follow commands   Eyes: bilateral pupils equal and reactive   ENT/Neck: +ETT midline, Neck supple, trachea midline, No JVD   Respiratory: rhonchi bilaterally   CV: S1S2, no murmurs        [x] Sinus rhythm  Abdominal: Soft, NT, ND +BS   Extremities: 1+ pedal edema noted, + peripheral pulses   Skin: No Rashes, Hematoma, Ecchymosis                         Assessment:   63 y/o male with PMH of CABG, DM, HTN, HLD presenting as transfer from [place] for c/f STEMI. PTA arrival received 325 aspirin, 600 plavix, and no heparin drip started. Around 1:30 am patient had multiple episodes of non-bloody diarrhea and emesis with associated abdominal pain and chest pain, unable to localize, non-radiating, with associated SOB and no associated palpitations or diaphoresis. No recent fevers/chills, cough, rashes, or changes in urination. Does report mild diffuse back pain; started 5 days ago in setting on injury while walking and lifting objects. Denies focal weakness, numbness/tingling, or LOC due does report mild dizziness.    Cardiac arrest s/p VA ECMO 11/25  Hemodynamic instability  Hypovolemia  Post op respiratory insufficiency  Thrombocytopenia  Acute blood loss anemia  Leukocytosis     Plan:   ***Neuro***   Addressed analgesic regimen to optimize function and sedated with Propofol and Precedex for ventilatory synchrony and agitation. Weaning propofol as tolerated. CT head showed 4mm subdural hematoma 11/26: repeat CT head unchanged     ***Cardiovascular***  Patient transferred to [facility] 11/25 after being placed on VA ECMO for PEA arrest. Unclear initial etiology as patient has both coronary disease with severe left ventricular systolic failure as well as evidence of pancreatitis. Patient has required an IV Levophed infusion for vasogenic shock and continues on VA ECMO for cardiogenic shock.  Invasive hemodynamic monitoring with a PA catheter & an A-line were required for the following of serial CI's/MV02's and continuous central venous, pulmonary artery pressure and MAP/BP monitoring to ensure adequate cardiovascular support. Lipitor was also continued for long term graft patency.       ***Pulmonary***  Respiratory status required full ventilatory support with subsequent weaning to pressure support, close monitoring of respiratory rate and breathing pattern, the following of ABG’s with A-line monitoring, continuous pulse oximetry monitoring. Patient is on 10 ppm of Nitric Oxide, plan to wean off today.    Mode: AC/ CMV (Assist Control/ Continuous Mandatory Ventilation)  RR (machine): 14  FiO2: 40  PEEP: 12  ITime: 1.4  MAP: 16  PC: 10  PIP: 23                 ***Heme***  Anemia due to acute blood loss, two units of packed red blood cells given today.  Continue to monitor hemoglobin/hematocrit and platelet levels.  Continue anticoagulation therapy with an IV Argatroban infusion for ECMO, titrate for PTT goal 40-45     ***GI***  Protonix for stress ulcer prophylaxis. Continue bowel regimen with Miralax and Senna. Right upper quadrant sono from 12/1 revealed Cholestasis w/ thickening of GB wall, trace pericholecystic fluid. Patient has resolving pancreatitis and I also suspect possible bowel ischemia early on. Plan to start enteral feeding at 20 cc/hr while monitoring for abdominal pain.    ***Renal***  Acute renal failure with rising BUN and creatinine. Holding diuretics. Gentle volume resuscitation with a sodium bicarbonate containing fluid due to hyperchloremic metabolic acidosis. Optimize renal perfusion with adequate volume resuscitation and continued monitoring of urine output, fluid balance, electrolytes, and BUN/Creatinine.        ***ID***  Afebrile, white blood cells elevated to 16.3. Continue trending white blood count and monitoring fever curve. Empiric Meropenem for possible ischemic bowel or pneumonic process.      ***Endocrine***  Metabolic stability, history of diabetes mellitus 2 required an IV regular Insulin drip while following serial glucose levels to help achieve and maintain euglycemia.        Patient requires continuous monitoring with bedside rhythm monitoring, pulse oximetry monitoring, and continuous central venous, pulmonary artery pressure and arterial pressure monitoring ; and intermittent blood gas analysis. Care plan discussed with the ICU care team.   Patient remained critical, at risk for life threatening decompensation.    I have spent 40 minutes providing critical care management for this patient.    By signing my name below, I, Leela Avila, attest that this documentation has been prepared under the direction and in the presence of Dinorah Rubio MD   Electronically signed: Brian Oakes, 12-03-22 @ 06:08    I, Dinorah Rubio, personally performed the services described in this documentation. all medical record entries made by the karlaibkyle were at my direction and in my presence. I have reviewed the chart and agree that the record reflects my personal performance and is accurate and complete  Electronically signed: Dinorah Rubio MD  HPI:  Patient is a 63 y/o male with PMH of CABG, DM, HTN, HLD presenting as transfer from [place] for c/f STEMI. PTA arrival received 325 aspirin, 600 plavix, and no heparin drip started. Around 1:30 am patient had multiple episodes of non-bloody diarrhea and emesis with associated abdominal pain and chest pain, unable to localize, non-radiating, with associated SOB and no associated palpitations or diaphoresis. No recent fevers/chills, cough, rashes, or changes in urination. Does report mild diffuse back pain; started 5 days ago in setting on injury while walking and lifting objects. Denies focal weakness, numbness/tingling, or LOC due does report mild dizziness.    Patient seen and examined in ED. Hemodynamically stable, alert and awake, A&Ox3. Daughter at bedside. Reports abdominal pain, and nausea. Abdomen is soft, tender in epigastric area and RUQ. Denies chest pain, SOB. WBC 20, T.bili 3.4, Lipase 300. RUQ US demonstrated gallbladder stones, pericholecystic fluid.  (24 Nov 2022 15:10)      Patient seen and examined at the bedside.    Remained critically ill on continuous ICU monitoring.    OBJECTIVE:  Vital Signs Last 24 Hrs  T(C): 37.1 (03 Dec 2022 04:00), Max: 37.2 (02 Dec 2022 16:00)  T(F): 98.8 (03 Dec 2022 04:00), Max: 99 (02 Dec 2022 16:00)  HR: 67 (03 Dec 2022 05:45) (64 - 72)  BP: --  BP(mean): --  RR: 14 (03 Dec 2022 05:45) (2 - 37)  SpO2: 100% (03 Dec 2022 05:45) (100% - 100%)    Parameters below as of 03 Dec 2022 04:00  Patient On (Oxygen Delivery Method): ventilator    O2 Concentration (%): 40    Physical Exam:   General: Intubated, multiple lines gtt  Neurology: mildly sedated, but able to follow commands   Eyes: bilateral pupils equal and reactive   ENT/Neck: +ETT midline, Neck supple, trachea midline, No JVD   Respiratory: rhonchi bilaterally   CV: S1S2, no murmurs        [x] Sinus rhythm  Abdominal: Soft, NT, ND +BS   Extremities: 1+ pedal edema noted, + peripheral pulses   Skin: No Rashes, Hematoma, Ecchymosis                         Assessment:   63 y/o male with PMH of CABG, DM, HTN, HLD presenting as transfer from [place] for c/f STEMI. PTA arrival received 325 aspirin, 600 plavix, and no heparin drip started. Around 1:30 am patient had multiple episodes of non-bloody diarrhea and emesis with associated abdominal pain and chest pain, unable to localize, non-radiating, with associated SOB and no associated palpitations or diaphoresis. No recent fevers/chills, cough, rashes, or changes in urination. Does report mild diffuse back pain; started 5 days ago in setting on injury while walking and lifting objects. Denies focal weakness, numbness/tingling, or LOC due does report mild dizziness.    Cardiac arrest s/p VA ECMO 11/25  Hemodynamic instability  Hypovolemia  Post op respiratory insufficiency  Thrombocytopenia  Acute blood loss anemia  Leukocytosis     Plan:   ***Neuro***   Addressed analgesic regimen to optimize function and sedated with Propofol and Precedex for ventilatory synchrony and agitation. Weaning propofol as tolerated. CT head showed 4mm subdural hematoma 11/26: repeat CT head unchanged     ***Cardiovascular***  Patient transferred to [facility] 11/25 after being placed on VA ECMO for PEA arrest. Unclear initial etiology as patient has both coronary disease with severe left ventricular systolic failure as well as evidence of pancreatitis. Patient has required an IV Levophed infusion for vasogenic shock and continues on VA ECMO for cardiogenic shock.  Invasive hemodynamic monitoring with a PA catheter & an A-line were required for the following of serial CI's/MV02's and continuous central venous, pulmonary artery pressure and MAP/BP monitoring to ensure adequate cardiovascular support. Lipitor was also continued for long term graft patency.       ***Pulmonary***  Respiratory status required full ventilatory support with subsequent weaning to pressure support, close monitoring of respiratory rate and breathing pattern, the following of ABG’s with A-line monitoring, continuous pulse oximetry monitoring. Patient is on 10 ppm of Nitric Oxide, plan to wean off today.    Mode: AC/ CMV (Assist Control/ Continuous Mandatory Ventilation)  RR (machine): 14  FiO2: 40  PEEP: 12  ITime: 1.4  MAP: 16  PC: 10  PIP: 23                 ***Heme***  Anemia due to acute blood loss, two units of packed red blood cells given today.  Continue to monitor hemoglobin/hematocrit and platelet levels.  Continue anticoagulation therapy with an IV Argatroban infusion for ECMO, titrate for PTT goal 40-45     ***GI***  Protonix for stress ulcer prophylaxis. Continue bowel regimen with Miralax and Senna. Right upper quadrant sono from 12/1 revealed Cholestasis w/ thickening of GB wall, trace pericholecystic fluid. Patient has resolving pancreatitis and I also suspect possible bowel ischemia early on. Plan to start enteral feeding at 20 cc/hr while monitoring for abdominal pain.    ***Renal***  Acute renal failure with rising BUN and creatinine. Holding diuretics. Gentle volume resuscitation with a sodium bicarbonate containing fluid due to hyperchloremic metabolic acidosis. Optimize renal perfusion with adequate volume resuscitation and continued monitoring of urine output, fluid balance, electrolytes, and BUN/Creatinine.        ***ID***  Afebrile, white blood cells elevated to 16.3. Continue trending white blood count and monitoring fever curve. Empiric Meropenem for possible ischemic bowel or pneumonic process.      ***Endocrine***  Metabolic stability, history of diabetes mellitus 2 required an IV regular Insulin drip while following serial glucose levels to help achieve and maintain euglycemia.        Patient requires continuous monitoring with bedside rhythm monitoring, pulse oximetry monitoring, and continuous central venous, pulmonary artery pressure and arterial pressure monitoring ; and intermittent blood gas analysis. Care plan discussed with the ICU care team.   Patient remained critical, at risk for life threatening decompensation.    I have spent 40 minutes providing critical care management for this patient.    By signing my name below, I, Leela Avila, attest that this documentation has been prepared under the direction and in the presence of Dinorah Rubio MD   Electronically signed: Brian Oakes, 12-03-22 @ 06:08    I, Dinorah Rubio, personally performed the services described in this documentation. all medical record entries made by the karlaibkyle were at my direction and in my presence. I have reviewed the chart and agree that the record reflects my personal performance and is accurate and complete  Electronically signed: Dinorah Rubio MD  HPI:  Patient is a 63 y/o male with PMH of CABG, DM, HTN, HLD presenting as transfer from [place] for c/f STEMI. PTA arrival received 325 aspirin, 600 plavix, and no heparin drip started. Around 1:30 am patient had multiple episodes of non-bloody diarrhea and emesis with associated abdominal pain and chest pain, unable to localize, non-radiating, with associated SOB and no associated palpitations or diaphoresis. No recent fevers/chills, cough, rashes, or changes in urination. Does report mild diffuse back pain; started 5 days ago in setting on injury while walking and lifting objects. Denies focal weakness, numbness/tingling, or LOC due does report mild dizziness.    Patient seen and examined in ED. Hemodynamically stable, alert and awake, A&Ox3. Daughter at bedside. Reports abdominal pain, and nausea. Abdomen is soft, tender in epigastric area and RUQ. Denies chest pain, SOB. WBC 20, T.bili 3.4, Lipase 300. RUQ US demonstrated gallbladder stones, pericholecystic fluid.  (24 Nov 2022 15:10)      Patient seen and examined at the bedside.    Remained critically ill on continuous ICU monitoring.    OBJECTIVE:  Vital Signs Last 24 Hrs  T(C): 37.1 (03 Dec 2022 04:00), Max: 37.2 (02 Dec 2022 16:00)  T(F): 98.8 (03 Dec 2022 04:00), Max: 99 (02 Dec 2022 16:00)  HR: 67 (03 Dec 2022 05:45) (64 - 72)  BP: --  BP(mean): --  RR: 14 (03 Dec 2022 05:45) (2 - 37)  SpO2: 100% (03 Dec 2022 05:45) (100% - 100%)    Parameters below as of 03 Dec 2022 04:00  Patient On (Oxygen Delivery Method): ventilator    O2 Concentration (%): 40    Physical Exam:   General: Intubated, multiple lines gtt  Neurology: mildly sedated, but able to follow commands   Eyes: bilateral pupils equal and reactive   ENT/Neck: +ETT midline, Neck supple, trachea midline, No JVD   Respiratory: rhonchi bilaterally   CV: S1S2, no murmurs        [x] Sinus rhythm  Abdominal: Soft, NT, ND +BS   Extremities: 1+ pedal edema noted, + peripheral pulses   Skin: No Rashes, Hematoma, Ecchymosis                         Assessment:   63 y/o male with PMH of CABG, DM, HTN, HLD presenting as transfer from [place] for c/f STEMI. PTA arrival received 325 aspirin, 600 plavix, and no heparin drip started. Around 1:30 am patient had multiple episodes of non-bloody diarrhea and emesis with associated abdominal pain and chest pain, unable to localize, non-radiating, with associated SOB and no associated palpitations or diaphoresis. No recent fevers/chills, cough, rashes, or changes in urination. Does report mild diffuse back pain; started 5 days ago in setting on injury while walking and lifting objects. Denies focal weakness, numbness/tingling, or LOC due does report mild dizziness.    Cardiac arrest s/p [facility] ECMO 11/25  Hemodynamic instability  Hypovolemia  Post op respiratory insufficiency  Thrombocytopenia  Acute blood loss anemia  Leukocytosis     Plan:   ***Neuro***   Addressed analgesic regimen to optimize function and sedated with Propofol and Precedex for ventilatory synchrony and agitation. Weaning propofol as tolerated. CT head showed 4mm subdural hematoma 11/26: repeat CT head unchanged     ***Cardiovascular***  Patient transferred to [facility] 11/25 after being placed on [facility] ECMO for PEA arrest. Unclear initial etiology as patient has both coronary disease with severe left ventricular systolic failure as well as evidence of pancreatitis. Patient has required an IV Levophed infusion for vasogenic shock and continues on [facility] ECMO for cardiogenic shock.  Invasive hemodynamic monitoring with a PA catheter & an A-line were required for the following of serial CI's/MV02's and continuous central venous, pulmonary artery pressure and MAP/BP monitoring to ensure adequate cardiovascular support. Lipitor was also continued for long term graft patency.       ***Pulmonary***  Respiratory status required full ventilatory support with subsequent weaning to pressure support, close monitoring of respiratory rate and breathing pattern, the following of ABG’s with A-line monitoring, continuous pulse oximetry monitoring. Patient is on 10 ppm of Nitric Oxide, plan to wean off today.    Mode: AC/ CMV (Assist Control/ Continuous Mandatory Ventilation)  RR (machine): 14  FiO2: 40  PEEP: 12  ITime: 1.4  MAP: 16  PC: 10  PIP: 23                 ***Heme***  Anemia due to acute blood loss, two units of packed red blood cells given today.  Continue to monitor hemoglobin/hematocrit and platelet levels.  Continue anticoagulation therapy with an IV Argatroban infusion for ECMO, titrate for PTT goal 40-45     ***GI***  Protonix for stress ulcer prophylaxis. Continue bowel regimen with Miralax and Senna. Right upper quadrant sono from 12/1 revealed Cholestasis w/ thickening of GB wall, trace pericholecystic fluid. Patient has resolving pancreatitis and I also suspect possible bowel ischemia early on. Plan to start enteral feeding at 20 cc/hr while monitoring for abdominal pain.    ***Renal***  Acute renal failure with rising BUN and creatinine. Holding diuretics. Gentle volume resuscitation with a sodium bicarbonate containing fluid due to hyperchloremic metabolic acidosis. Optimize renal perfusion with adequate volume resuscitation and continued monitoring of urine output, fluid balance, electrolytes, and BUN/Creatinine.        ***ID***  Afebrile, white blood cells elevated to 16.3. Continue trending white blood count and monitoring fever curve. Empiric Meropenem for possible ischemic bowel or pneumonic process.      ***Endocrine***  Metabolic stability, history of diabetes mellitus 2 required an IV regular Insulin drip while following serial glucose levels to help achieve and maintain euglycemia.        Patient requires continuous monitoring with bedside rhythm monitoring, pulse oximetry monitoring, and continuous central venous, pulmonary artery pressure and arterial pressure monitoring ; and intermittent blood gas analysis. Care plan discussed with the ICU care team.   Patient remained critical, at risk for life threatening decompensation.    I have spent 40 minutes providing critical care management for this patient.    By signing my name below, I, Leela Avila, attest that this documentation has been prepared under the direction and in the presence of Dinorah Rbuio MD   Electronically signed: Brian Oakes, 12-03-22 @ 06:08    I, Dinorah Rubio, personally performed the services described in this documentation. all medical record entries made by the karlaibkyle were at my direction and in my presence. I have reviewed the chart and agree that the record reflects my personal performance and is accurate and complete  Electronically signed: Dinorah Rubio MD

## 2022-12-03 NOTE — PROGRESS NOTE ADULT - SUBJECTIVE AND OBJECTIVE BOX
Patient seen and examined at the bedside.    Remained critically ill on continuous ICU monitoring.    OBJECTIVE:  Vital Signs Last 24 Hrs  T(C): 36.8 (03 Dec 2022 20:00), Max: 37.7 (03 Dec 2022 08:00)  T(F): 98.2 (03 Dec 2022 20:00), Max: 99.9 (03 Dec 2022 08:00)  HR: 71 (03 Dec 2022 20:15) (65 - 75)  BP: --  BP(mean): --  RR: 14 (03 Dec 2022 20:15) (10 - 22)  SpO2: 100% (03 Dec 2022 20:15) (100% - 100%)    Parameters below as of 03 Dec 2022 20:00  Patient On (Oxygen Delivery Method): ventilator    O2 Concentration (%): 40      Physical Exam:   General: Intubated, multiple lines gtt  Neurology: Sedated  Eyes: bilateral pupils equal and reactive   ENT/Neck: +ETT midline, Neck supple, trachea midline, No JVD   Respiratory: rhonchi bilaterally   CV: S1S2, no murmurs        [x] Sinus rhythm  Abdominal: Soft, NT, ND +BS   Extremities: 1+ pedal edema noted, + peripheral pulses   Skin: No Rashes, Hematoma, Ecchymosis                                  Assessment:   61 y/o male with PMH of CABG, DM, HTN, HLD presenting as transfer from [place] for c/f STEMI. PTA arrival received 325 aspirin, 600 plavix, and no heparin drip started. Around 1:30 am patient had multiple episodes of non-bloody diarrhea and emesis with associated abdominal pain and chest pain, unable to localize, non-radiating, with associated SOB and no associated palpitations or diaphoresis. No recent fevers/chills, cough, rashes, or changes in urination. Does report mild diffuse back pain; started 5 days ago in setting on injury while walking and lifting objects. Denies focal weakness, numbness/tingling, or LOC due does report mild dizziness.    Cardiac arrest s/p [facility] ECMO 11/25  Hemodynamic instability  Hypovolemia  Post op respiratory insufficiency  Thrombocytopenia  Acute blood loss anemia  Leukocytosis       Plan:   ***Neuro***  CT head showed 4mm subdural hematoma 11/26: repeat CT head unchanged 12/01  [x] Sedated with [x] Precedex and propofol   Post operative neuro assessment   Dilaudid for pain    ***Cardiovascular***  11/30 TTE: EF 30-35%, Severe global LV dysfunction  Invasive hemodynamic monitoring, assess perfusion indices   SR / CVP 2/ MAP 69/ Hct 25.5/ Lactate 0.5  [x] Cardene 2mgs [x] Levophed 0.05 mcgs  [x] ECMO- 3600 rpm, 3.93 flow  Reassessment of hemodynamics post resuscitation   [x] Statin  [x] AC Therapy with Argatroban gtt     ***Pulmonary***  Post op vent management   Titration of FiO2 and PEEP, follow SpO2, CXR, blood gasses   100% FiO2 on ECMO    Mode: AC/ CMV (Assist Control/ Continuous Mandatory Ventilation)  RR (machine): 14  FiO2: 40  PEEP: 12  ITime: 1.4  MAP: 16  PC: 10  PIP: 23              ***GI***  [x] NPO with TF's @goal of 20cc/hr  [x] Protonix    Bowel regimen with Miralax and senna     ***Renal***  Continue to monitor I/Os, BUN/Creatinine.   Replete lytes PRN  Va present  Free Water 300 q4hr    ***ID***  Meropenem for empiric coverage    ***Endocrine***  [x]  DM2: HbA1c 7.3%                - [x] Insulin gtt              - Need tight glycemic control to prevent wound infection.      Patient requires continuous monitoring with bedside rhythm monitoring, pulse oximetry monitoring, and continuous central venous and arterial pressure monitoring; and intermittent blood gas analysis. Care plan discussed with the ICU care team.   Patient remained critical, at risk for life threatening decompensation.    I have spent 30 minutes providing critical care management to this patient.    By signing my name below, I, Erin Palafox, attest that this documentation has been prepared under the direction and in the presence of BROOKE Hitchcock.  Electronically signed: Brian Hunt, 12-03-22 @ 20:14    I, Fredo Velez , personally performed the services described in this documentation. all medical record entries made by the scribe were at my direction and in my presence. I have reviewed the chart and agree that the record reflects my personal performance and is accurate and complete  Electronically signed: BROOKE Hitchcock. Patient seen and examined at the bedside.    Remained critically ill on continuous ICU monitoring.    OBJECTIVE:  Vital Signs Last 24 Hrs  T(C): 36.8 (03 Dec 2022 20:00), Max: 37.7 (03 Dec 2022 08:00)  T(F): 98.2 (03 Dec 2022 20:00), Max: 99.9 (03 Dec 2022 08:00)  HR: 71 (03 Dec 2022 20:15) (65 - 75)  BP: --  BP(mean): --  RR: 14 (03 Dec 2022 20:15) (10 - 22)  SpO2: 100% (03 Dec 2022 20:15) (100% - 100%)    Parameters below as of 03 Dec 2022 20:00  Patient On (Oxygen Delivery Method): ventilator    O2 Concentration (%): 40      Physical Exam:   General: Intubated, multiple lines gtt  Neurology: Sedated  Eyes: bilateral pupils equal and reactive   ENT/Neck: +ETT midline, Neck supple, trachea midline, No JVD   Respiratory: rhonchi bilaterally   CV: S1S2, no murmurs        [x] Sinus rhythm  Abdominal: Soft, NT, ND +BS   Extremities: 1+ pedal edema noted, + peripheral pulses   Skin: No Rashes, Hematoma, Ecchymosis                                  Assessment:   63 y/o male with PMH of CABG, DM, HTN, HLD presenting as transfer from [place] for c/f STEMI. PTA arrival received 325 aspirin, 600 plavix, and no heparin drip started. Around 1:30 am patient had multiple episodes of non-bloody diarrhea and emesis with associated abdominal pain and chest pain, unable to localize, non-radiating, with associated SOB and no associated palpitations or diaphoresis. No recent fevers/chills, cough, rashes, or changes in urination. Does report mild diffuse back pain; started 5 days ago in setting on injury while walking and lifting objects. Denies focal weakness, numbness/tingling, or LOC due does report mild dizziness.    Cardiac arrest s/p VA ECMO 11/25  Hemodynamic instability  Hypovolemia  Post op respiratory insufficiency  Thrombocytopenia  Acute blood loss anemia  Leukocytosis       Plan:   ***Neuro***  CT head showed 4mm subdural hematoma 11/26: repeat CT head unchanged 12/01  [x] Sedated with [x] Precedex and propofol   Post operative neuro assessment   Dilaudid for pain    ***Cardiovascular***  11/30 TTE: EF 30-35%, Severe global LV dysfunction  Invasive hemodynamic monitoring, assess perfusion indices   SR / CVP 2/ MAP 69/ Hct 25.5/ Lactate 0.5  [x] Cardene 2mgs [x] Levophed 0.05 mcgs  [x] ECMO- 3600 rpm, 3.93 flow  Reassessment of hemodynamics post resuscitation   [x] Statin  [x] AC Therapy with Argatroban gtt     ***Pulmonary***  Post op vent management   Titration of FiO2 and PEEP, follow SpO2, CXR, blood gasses   100% FiO2 on ECMO    Mode: AC/ CMV (Assist Control/ Continuous Mandatory Ventilation)  RR (machine): 14  FiO2: 40  PEEP: 12  ITime: 1.4  MAP: 16  PC: 10  PIP: 23              ***GI***  [x] NPO with TF's @goal of 20cc/hr  [x] Protonix    Bowel regimen with Miralax and senna     ***Renal***  Continue to monitor I/Os, BUN/Creatinine.   Replete lytes PRN  Va present  Free Water 300 q4hr    ***ID***  Meropenem for empiric coverage    ***Endocrine***  [x]  DM2: HbA1c 7.3%                - [x] Insulin gtt              - Need tight glycemic control to prevent wound infection.      Patient requires continuous monitoring with bedside rhythm monitoring, pulse oximetry monitoring, and continuous central venous and arterial pressure monitoring; and intermittent blood gas analysis. Care plan discussed with the ICU care team.   Patient remained critical, at risk for life threatening decompensation.    I have spent 30 minutes providing critical care management to this patient.    By signing my name below, I, Erin Palafox, attest that this documentation has been prepared under the direction and in the presence of BROOKE Hitchcock.  Electronically signed: Brian Hunt, 12-03-22 @ 20:14    I, Fredo Velez , personally performed the services described in this documentation. all medical record entries made by the scribe were at my direction and in my presence. I have reviewed the chart and agree that the record reflects my personal performance and is accurate and complete  Electronically signed: BROOKE Hitchcock. Patient seen and examined at the bedside.    Remained critically ill on continuous ICU monitoring.    OBJECTIVE:  Vital Signs Last 24 Hrs  T(C): 36.8 (03 Dec 2022 20:00), Max: 37.7 (03 Dec 2022 08:00)  T(F): 98.2 (03 Dec 2022 20:00), Max: 99.9 (03 Dec 2022 08:00)  HR: 71 (03 Dec 2022 20:15) (65 - 75)  BP: --  BP(mean): --  RR: 14 (03 Dec 2022 20:15) (10 - 22)  SpO2: 100% (03 Dec 2022 20:15) (100% - 100%)    Parameters below as of 03 Dec 2022 20:00  Patient On (Oxygen Delivery Method): ventilator    O2 Concentration (%): 40      Physical Exam:   General: Intubated, multiple lines gtt  Neurology: Sedated  Eyes: bilateral pupils equal and reactive   ENT/Neck: +ETT midline, Neck supple, trachea midline, No JVD   Respiratory: rhonchi bilaterally   CV: S1S2, no murmurs        [x] Sinus rhythm  Abdominal: Soft, NT, ND +BS   Extremities: 1+ pedal edema noted, + peripheral pulses   Skin: No Rashes, Hematoma, Ecchymosis                                  Assessment:   63 y/o male with PMH of CABG, DM, HTN, HLD presenting as transfer from [place] for c/f STEMI. PTA arrival received 325 aspirin, 600 plavix, and no heparin drip started. Around 1:30 am patient had multiple episodes of non-bloody diarrhea and emesis with associated abdominal pain and chest pain, unable to localize, non-radiating, with associated SOB and no associated palpitations or diaphoresis. No recent fevers/chills, cough, rashes, or changes in urination. Does report mild diffuse back pain; started 5 days ago in setting on injury while walking and lifting objects. Denies focal weakness, numbness/tingling, or LOC due does report mild dizziness.    Cardiac arrest s/p VA ECMO 11/25  Hemodynamic instability  Hypovolemia  Post op respiratory insufficiency  Thrombocytopenia  Acute blood loss anemia  Leukocytosis       Plan:   ***Neuro***  CT head showed 4mm subdural hematoma 11/26: repeat CT head unchanged 12/01  [x] Sedated with [x] Precedex and propofol   Post operative neuro assessment   Dilaudid for pain    ***Cardiovascular***  11/30 TTE: EF 30-35%, Severe global LV dysfunction  Invasive hemodynamic monitoring, assess perfusion indices   SR / CVP 2/ MAP 69/ Hct 25.5/ Lactate 0.5  [x] Cardene 2mgs [x] Levophed 0.05 mcgs  [x] ECMO- 3600 rpm, 3.93 flow  Reassessment of hemodynamics post resuscitation   [x] Statin  [x] AC Therapy with Argatroban gtt     ***Pulmonary***  Post op vent management   Titration of FiO2 and PEEP, follow SpO2, CXR, blood gasses   100% FiO2 on ECMO    Mode: AC/ CMV (Assist Control/ Continuous Mandatory Ventilation)  RR (machine): 14  FiO2: 40  PEEP: 12  ITime: 1.4  MAP: 16  PC: 10  PIP: 23              ***GI***  [x] NPO with TF's @goal of 20cc/hr  [x] Protonix    Bowel regimen with Miralax and senna     ***Renal***  Continue to monitor I/Os, BUN/Creatinine.   Replete lytes PRN  Va present  Free Water 300 q4hr    ***ID***  Meropenem for empiric coverage    ***Endocrine***  [x]  DM2: HbA1c 7.3%                - [x] Insulin gtt              - Need tight glycemic control to prevent wound infection.      Patient requires continuous monitoring with bedside rhythm monitoring, pulse oximetry monitoring, and continuous central venous and arterial pressure monitoring; and intermittent blood gas analysis. Care plan discussed with the ICU care team.   Patient remained critical, at risk for life threatening decompensation.    I have spent 30 minutes providing critical care management to this patient.    By signing my name below, I, Erin Palafox, attest that this documentation has been prepared under the direction and in the presence of BROOKE Hitchcock.  Electronically signed: Brian uHnt, 12-03-22 @ 20:14    I, Fredo Velez , personally performed the services described in this documentation. all medical record entries made by the scribe were at my direction and in my presence. I have reviewed the chart and agree that the record reflects my personal performance and is accurate and complete  Electronically signed: BROOKE Hitchcock. Patient seen and examined at the bedside.    Remained critically ill on continuous ICU monitoring.    OBJECTIVE:  Vital Signs Last 24 Hrs  T(C): 36.8 (03 Dec 2022 20:00), Max: 37.7 (03 Dec 2022 08:00)  T(F): 98.2 (03 Dec 2022 20:00), Max: 99.9 (03 Dec 2022 08:00)  HR: 71 (03 Dec 2022 20:15) (65 - 75)  BP(mean): 70  RR: 14 (03 Dec 2022 20:15) (10 - 22)  SpO2: 100% (03 Dec 2022 20:15) (100% - 100%)    Parameters below as of 03 Dec 2022 20:00  Patient On (Oxygen Delivery Method): ventilator    O2 Concentration (%): 40    Physical Exam:   General: Intubated, multiple lines gtt  Neurology: Sedated  Eyes: bilateral pupils equal and reactive   ENT/Neck: +ETT midline, Neck supple, trachea midline, No JVD   Respiratory: rhonchi bilaterally   CV: S1S2, no murmurs        [x] Sinus rhythm  Abdominal: Soft, NT, ND +BS   Extremities: 1+ pedal edema noted, + peripheral pulses   Skin: No Rashes, Hematoma, Ecchymosis                                Assessment:   63 y/o male with PMH of CABG, DM, HTN, HLD presenting as transfer from [place] for c/f STEMI. PTA arrival received 325 aspirin, 600 plavix, and no heparin drip started. Around 1:30 am patient had multiple episodes of non-bloody diarrhea and emesis with associated abdominal pain and chest pain, unable to localize, non-radiating, with associated SOB and no associated palpitations or diaphoresis. No recent fevers/chills, cough, rashes, or changes in urination. Does report mild diffuse back pain; started 5 days ago in setting on injury while walking and lifting objects. Denies focal weakness, numbness/tingling, or LOC due does report mild dizziness.    Cardiac arrest s/p VA ECMO 11/25  Hemodynamic instability  Hypovolemia  Post op respiratory insufficiency  Thrombocytopenia  Acute blood loss anemia  Leukocytosis     Plan:   ***Neuro***  CT head showed 4mm subdural hematoma 11/26: repeat CT head unchanged 12/01  [x] Sedated with [x] Precedex and propofol   Post operative neuro assessment   Dilaudid for pain    ***Cardiovascular***  11/30 TTE: EF 30-35%, Severe global LV dysfunction  Invasive hemodynamic monitoring, assess perfusion indices   SR 63/ CVP 11/ MAP 69/ Hct 25.5/ Lactate 0.5  [x] ECMO- 3600 rpm, 4.11 flow  Reassessment of hemodynamics post resuscitation   [x] Statin  [x] AC Therapy with Argatroban gtt  ?cardiac cath when renal function stabilizes  Heme- 2U PRBC given today     ***Pulmonary***  Post op vent management   Titration of FiO2 and PEEP, follow SpO2, CXR, blood gasses   100% FiO2 on ECMO  Nitric oxide weaned off today    Mode: AC/ CMV (Assist Control/ Continuous Mandatory Ventilation)  RR (machine): 14  FiO2: 40  PEEP: 12  ITime: 1.4  MAP: 16  PC: 10  PIP: 23            ***GI***  [x] NPO with TF's @goal of 20cc/hr  [x] Protonix    Bowel regimen with Miralax and senna     ***Renal***  ERIC, renal following  Continue to monitor I/Os, BUN/Creatinine.   Replete lytes PRN  De Dios present  Free Water 300 q4hr    ***ID***  Tmax 99F on CVVH -- presumed fever, with leukocytosis, central line changed, Vancomycin 1g x 1 ordered in addition to Meropenem for empiric coverage    ***Endocrine***  [x]  DM2: HbA1c 7.3%                - [x] Insulin gtt              - Need tight glycemic control to prevent wound infection.    Patient requires continuous monitoring with bedside rhythm monitoring, pulse oximetry monitoring, and continuous central venous and arterial pressure monitoring; and intermittent blood gas analysis. Care plan discussed with the ICU care team.   Patient remained critical, at risk for life threatening decompensation.    I have spent 35 minutes providing critical care management to this patient.    By signing my name below, I, Erin Palafox, attest that this documentation has been prepared under the direction and in the presence of BROOKE Hitchcock.  Electronically signed: Brian Hunt, 12-03-22 @ 20:14    I, Fredo Velez , personally performed the services described in this documentation. all medical record entries made by the karlaibkyle were at my direction and in my presence. I have reviewed the chart and agree that the record reflects my personal performance and is accurate and complete  Electronically signed: BROOKE Hitchcock. Patient seen and examined at the bedside.    Remained critically ill on continuous ICU monitoring.    OBJECTIVE:  Vital Signs Last 24 Hrs  T(C): 36.8 (03 Dec 2022 20:00), Max: 37.7 (03 Dec 2022 08:00)  T(F): 98.2 (03 Dec 2022 20:00), Max: 99.9 (03 Dec 2022 08:00)  HR: 71 (03 Dec 2022 20:15) (65 - 75)  BP(mean): 70  RR: 14 (03 Dec 2022 20:15) (10 - 22)  SpO2: 100% (03 Dec 2022 20:15) (100% - 100%)    Parameters below as of 03 Dec 2022 20:00  Patient On (Oxygen Delivery Method): ventilator    O2 Concentration (%): 40    Physical Exam:   General: Intubated, multiple lines gtt  Neurology: Sedated  Eyes: bilateral pupils equal and reactive   ENT/Neck: +ETT midline, Neck supple, trachea midline, No JVD   Respiratory: rhonchi bilaterally   CV: S1S2, no murmurs        [x] Sinus rhythm  Abdominal: Soft, NT, ND +BS   Extremities: 1+ pedal edema noted, + peripheral pulses   Skin: No Rashes, Hematoma, Ecchymosis                                Assessment:   61 y/o male with PMH of CABG, DM, HTN, HLD presenting as transfer from [place] for c/f STEMI. PTA arrival received 325 aspirin, 600 plavix, and no heparin drip started. Around 1:30 am patient had multiple episodes of non-bloody diarrhea and emesis with associated abdominal pain and chest pain, unable to localize, non-radiating, with associated SOB and no associated palpitations or diaphoresis. No recent fevers/chills, cough, rashes, or changes in urination. Does report mild diffuse back pain; started 5 days ago in setting on injury while walking and lifting objects. Denies focal weakness, numbness/tingling, or LOC due does report mild dizziness.    Cardiac arrest s/p VA ECMO 11/25  Hemodynamic instability  Hypovolemia  Post op respiratory insufficiency  Thrombocytopenia  Acute blood loss anemia  Leukocytosis     Plan:   ***Neuro***  CT head showed 4mm subdural hematoma 11/26: repeat CT head unchanged 12/01  [x] Sedated with [x] Precedex and propofol   Post operative neuro assessment   Dilaudid for pain    ***Cardiovascular***  11/30 TTE: EF 30-35%, Severe global LV dysfunction  Invasive hemodynamic monitoring, assess perfusion indices   SR 63/ CVP 11/ MAP 69/ Hct 25.5/ Lactate 0.5  [x] ECMO- 3600 rpm, 4.11 flow  Reassessment of hemodynamics post resuscitation   [x] Statin  [x] AC Therapy with Argatroban gtt  ?cardiac cath when renal function stabilizes  Heme- 2U PRBC given today     ***Pulmonary***  Post op vent management   Titration of FiO2 and PEEP, follow SpO2, CXR, blood gasses   100% FiO2 on ECMO  Nitric oxide weaned off today    Mode: AC/ CMV (Assist Control/ Continuous Mandatory Ventilation)  RR (machine): 14  FiO2: 40  PEEP: 12  ITime: 1.4  MAP: 16  PC: 10  PIP: 23            ***GI***  [x] NPO with TF's @goal of 20cc/hr  [x] Protonix    Bowel regimen with Miralax and senna     ***Renal***  ERIC, renal following  Continue to monitor I/Os, BUN/Creatinine.   Replete lytes PRN  De Dios present  Free Water 300 q4hr    ***ID***  Tmax 99F on CVVH -- presumed fever, with leukocytosis, central line changed, Vancomycin 1g x 1 ordered in addition to Meropenem for empiric coverage    ***Endocrine***  [x]  DM2: HbA1c 7.3%                - [x] Insulin gtt              - Need tight glycemic control to prevent wound infection.    Patient requires continuous monitoring with bedside rhythm monitoring, pulse oximetry monitoring, and continuous central venous and arterial pressure monitoring; and intermittent blood gas analysis. Care plan discussed with the ICU care team.   Patient remained critical, at risk for life threatening decompensation.    I have spent 35 minutes providing critical care management to this patient.    By signing my name below, I, Erin Palafox, attest that this documentation has been prepared under the direction and in the presence of BROOKE Hitchcock.  Electronically signed: Brian Hunt, 12-03-22 @ 20:14    I, Fredo Velez , personally performed the services described in this documentation. all medical record entries made by the karlaibkyle were at my direction and in my presence. I have reviewed the chart and agree that the record reflects my personal performance and is accurate and complete  Electronically signed: BROOKE Hitchcock. Patient seen and examined at the bedside.    Remained critically ill on continuous ICU monitoring.    OBJECTIVE:  Vital Signs Last 24 Hrs  T(C): 36.8 (03 Dec 2022 20:00), Max: 37.7 (03 Dec 2022 08:00)  T(F): 98.2 (03 Dec 2022 20:00), Max: 99.9 (03 Dec 2022 08:00)  HR: 71 (03 Dec 2022 20:15) (65 - 75)  BP(mean): 70  RR: 14 (03 Dec 2022 20:15) (10 - 22)  SpO2: 100% (03 Dec 2022 20:15) (100% - 100%)    Parameters below as of 03 Dec 2022 20:00  Patient On (Oxygen Delivery Method): ventilator    O2 Concentration (%): 40    Physical Exam:   General: Intubated, multiple lines gtt  Neurology: Sedated  Eyes: bilateral pupils equal and reactive   ENT/Neck: +ETT midline, Neck supple, trachea midline, No JVD   Respiratory: rhonchi bilaterally   CV: S1S2, no murmurs        [x] Sinus rhythm  Abdominal: Soft, NT, ND +BS   Extremities: 1+ pedal edema noted, + peripheral pulses   Skin: No Rashes, Hematoma, Ecchymosis                                Assessment:   61 y/o male with PMH of CABG, DM, HTN, HLD presenting as transfer from [place] for c/f STEMI. PTA arrival received 325 aspirin, 600 plavix, and no heparin drip started. Around 1:30 am patient had multiple episodes of non-bloody diarrhea and emesis with associated abdominal pain and chest pain, unable to localize, non-radiating, with associated SOB and no associated palpitations or diaphoresis. No recent fevers/chills, cough, rashes, or changes in urination. Does report mild diffuse back pain; started 5 days ago in setting on injury while walking and lifting objects. Denies focal weakness, numbness/tingling, or LOC due does report mild dizziness.    Cardiac arrest s/p VA ECMO 11/25  Hemodynamic instability  Hypovolemia  Post op respiratory insufficiency  Thrombocytopenia  Acute blood loss anemia  Leukocytosis     Plan:   ***Neuro***  CT head showed 4mm subdural hematoma 11/26: repeat CT head unchanged 12/01  [x] Sedated with [x] Precedex and propofol   Post operative neuro assessment   Dilaudid for pain    ***Cardiovascular***  11/30 TTE: EF 30-35%, Severe global LV dysfunction  Invasive hemodynamic monitoring, assess perfusion indices   SR 63/ CVP 11/ MAP 69/ Hct 25.5/ Lactate 0.5  [x] ECMO- 3600 rpm, 4.11 flow  Reassessment of hemodynamics post resuscitation   [x] Statin  [x] AC Therapy with Argatroban gtt  ?cardiac cath when renal function stabilizes  Heme- 2U PRBC given today     ***Pulmonary***  Post op vent management   Titration of FiO2 and PEEP, follow SpO2, CXR, blood gasses   100% FiO2 on ECMO  Nitric oxide weaned off today    Mode: AC/ CMV (Assist Control/ Continuous Mandatory Ventilation)  RR (machine): 14  FiO2: 40  PEEP: 12  ITime: 1.4  MAP: 16  PC: 10  PIP: 23            ***GI***  [x] NPO with TF's @goal of 20cc/hr  [x] Protonix    Bowel regimen with Miralax and senna     ***Renal***  ERIC, renal following  Continue to monitor I/Os, BUN/Creatinine.   Replete lytes PRN  De Dios present  Free Water 300 q4hr    ***ID***  Tmax 99F on CVVH -- presumed fever, with leukocytosis, central line changed, Vancomycin 1g x 1 ordered in addition to Meropenem for empiric coverage    ***Endocrine***  [x]  DM2: HbA1c 7.3%                - [x] Insulin gtt              - Need tight glycemic control to prevent wound infection.    Patient requires continuous monitoring with bedside rhythm monitoring, pulse oximetry monitoring, and continuous central venous and arterial pressure monitoring; and intermittent blood gas analysis. Care plan discussed with the ICU care team.   Patient remained critical, at risk for life threatening decompensation.    I have spent 35 minutes providing critical care management to this patient.    By signing my name below, I, Erin Palafox, attest that this documentation has been prepared under the direction and in the presence of BROOKE Hitchcock.  Electronically signed: Brian Hunt, 12-03-22 @ 20:14    I, Fredo Velez , personally performed the services described in this documentation. all medical record entries made by the karlaibkyle were at my direction and in my presence. I have reviewed the chart and agree that the record reflects my personal performance and is accurate and complete  Electronically signed: BROOKE Hitchcock.

## 2022-12-03 NOTE — PROGRESS NOTE ADULT - PROBLEM SELECTOR PLAN 1
Pt with non oliguric ERIC/ ATN in the setting of STEMI, cardiac arrest & cardiogenic shock  SCr on arrival was 2.15; trended down to hector 1.6 on 11/25;  slowly trended up & peaked to 3.95 11/28.     SCr increased to 3.60 from 3.28 yesterday from hypovolemic status as CVP is low. Pt with significant UOP of ~3L in last 24 hours. Pt currently off diuretics as per CTICU team. Labs reviewed. Strict I/O. Monitor labs and urine output. Avoid NSAIDs, RCA and nephrotoxins. Dose medications as per eGFR.    Please obtain repeat UA and urine lytes (Na, K, Cl, Cr, Urea, Osm)

## 2022-12-03 NOTE — PROGRESS NOTE ADULT - ATTENDING COMMENTS
CXR improving and remains pulsatile on full ECMO support albeit with lower MAPs. Cr worsening today. Keep positive today with PRBC/IVF and will reassess renal function. Plan is for LHC and IABP pending improvement in ATN/ERIC. Pan-culture given leukocytosis and low grade temperature.

## 2022-12-03 NOTE — PROGRESS NOTE ADULT - PROBLEM SELECTOR PLAN 5
- likely arrest associated ATN and now worsened from hypovolemia   - stop diuretics and aim to keep net even   - no indication for dialysis  - renal following. - likely arrest associated ATN and now worsened from hypovolemia   - stop diuretics and aim to keep net even to positive   - no indication for dialysis  - renal following.

## 2022-12-03 NOTE — PROGRESS NOTE ADULT - ASSESSMENT
63 YO M with a history of CAD s/p 4v CABG ~2019 in [facility], DM2 (A1c 7.3%), and HTN who initially presented to OSH with abdominal pain and n/v and found to have pancreatitis with lipase > 3000 though also with elevated troponins. CT abdomen revealed acute pancreatitis and his initial LVEF was 40-45%. He developed MSOF with hypoxic respiratory failure requiring intubation and ERIC and went into hypoxic PEA arrest requiring ACLS and due to persistent hypoxia and hypotension on pressors after ROSC was cannulated to VA ECMO (LFV, RFA, no anterograde perfusion catheter) on 11/25. Notably CTH that day revealed small subdural hemorrhage.     His post arrest TTE on 11/26 showed a signficantly worse LVEF of 5-10% with an AV that was only minimally opening. Since then he has had improvement in his LV function (now ~35-40%) and improved pulsatility while tolerating progressive slow ECMO weans. ECMO turndown (note in chart 11/30) was reassuring for ability to decannulate to IABP/inotropes. He is awaiting LHC (IABP at same time) when renal function improving and per CTS are deferring ECMO decannulation at this time until pancreatitis/ARDS has improved.  61 YO M with a history of CAD s/p 4v CABG ~2019 in [facility], DM2 (A1c 7.3%), and HTN who initially presented to OSH with abdominal pain and n/v and found to have pancreatitis with lipase > 3000 though also with elevated troponins. CT abdomen revealed acute pancreatitis and his initial LVEF was 40-45%. He developed MSOF with hypoxic respiratory failure requiring intubation and ERIC and went into hypoxic PEA arrest requiring ACLS and due to persistent hypoxia and hypotension on pressors after ROSC was cannulated to VA ECMO (LFV, RFA, no anterograde perfusion catheter) on 11/25. Notably CTH that day revealed small subdural hemorrhage.     His post arrest TTE on 11/26 showed a signficantly worse LVEF of 5-10% with an AV that was only minimally opening. Since then he has had improvement in his LV function (now ~35-40%) and improved pulsatility while tolerating progressive slow ECMO weans. ECMO turndown (note in chart 11/30) was reassuring for ability to decannulate to IABP/inotropes. He is awaiting LHC (IABP at same time) when renal function improving and per CTS are deferring ECMO decannulation at this time until pancreatitis/ARDS has improved.  63 YO M with a history of CAD s/p 4v CABG ~2019 in [facility], DM2 (A1c 7.3%), and HTN who initially presented to OSH with abdominal pain and n/v and found to have pancreatitis with lipase > 3000 though also with elevated troponins. CT abdomen revealed acute pancreatitis and his initial LVEF was 40-45%. He developed MSOF with hypoxic respiratory failure requiring intubation and ERIC and went into hypoxic PEA arrest requiring ACLS and due to persistent hypoxia and hypotension on pressors after ROSC was cannulated to VA ECMO (LFV, RFA, no anterograde perfusion catheter) on 11/25. Notably CTH that day revealed small subdural hemorrhage.     His post arrest TTE on 11/26 showed a signficantly worse LVEF of 5-10% with an AV that was only minimally opening. Since then he has had improvement in his LV function (now ~35-40%) and improved pulsatility while tolerating progressive slow ECMO weans. ECMO turndown (note in chart 11/30) was reassuring for ability to decannulate to IABP/inotropes. He is awaiting LHC (IABP at same time) when renal function improving and per CTS are deferring ECMO decannulation at this time until pancreatitis/ARDS has improved.  63 YO M with a history of CAD s/p 4v CABG ~2019 in [facility], DM2 (A1c 7.3%), and HTN who initially presented to OSH with abdominal pain and n/v and found to have pancreatitis with lipase > 3000 though also with elevated troponins. CT abdomen revealed acute pancreatitis and his initial LVEF was 40-45%. He developed MSOF with hypoxic respiratory failure requiring intubation and ERIC and went into hypoxic PEA arrest requiring ACLS and due to persistent hypoxia and hypotension on pressors after ROSC was cannulated to VA ECMO (LFV, RFA, no anterograde perfusion catheter) on 11/25. Notably CTH that day revealed small subdural hemorrhage.     His post arrest TTE on 11/26 showed a signficantly worse LVEF of 5-10% with an AV that was only minimally opening. Since then he has had improvement in his LV function (now ~35-40%) and improved pulsatility while tolerating progressive slow ECMO weans. ECMO turndown (note in chart 11/30) was reassuring for ability to decannulate to IABP/inotropes. He is awaiting LHC (IABP at same time) when renal function improves and afterwards will tentatively plan for ECMO decannulation. His ARDS is improving.  63 YO M with a history of CAD s/p 4v CABG ~2019 in [facility], DM2 (A1c 7.3%), and HTN who initially presented to OSH with abdominal pain and n/v and found to have pancreatitis with lipase > 3000 though also with elevated troponins. CT abdomen revealed acute pancreatitis and his initial LVEF was 40-45%. He developed MSOF with hypoxic respiratory failure requiring intubation and ERIC and went into hypoxic PEA arrest requiring ACLS and due to persistent hypoxia and hypotension on pressors after ROSC was cannulated to VA ECMO (LFV, RFA, no anterograde perfusion catheter) on 11/25. Notably CTH that day revealed small subdural hemorrhage.     His post arrest TTE on 11/26 showed a signficantly worse LVEF of 5-10% with an AV that was only minimally opening. Since then he has had improvement in his LV function (now ~35-40%) and improved pulsatility while tolerating progressive slow ECMO weans. ECMO turndown (note in chart 11/30) was reassuring for ability to decannulate to IABP/inotropes. He is awaiting LHC (IABP at same time) when renal function improves and afterwards will tentatively plan for ECMO decannulation. His ARDS is improving.  63 YO M with a history of CAD s/p 4v CABG ~2019 in [facility], DM2 (A1c 7.3%), and HTN who initially presented to OSH with abdominal pain and n/v and found to have pancreatitis with lipase > 3000 though also with elevated troponins. CT abdomen revealed acute pancreatitis and his initial LVEF was 40-45%. He developed MSOF with hypoxic respiratory failure requiring intubation and ERIC and went into hypoxic PEA arrest requiring ACLS and due to persistent hypoxia and hypotension on pressors after ROSC was cannulated to VA ECMO (LFV, RFA, no anterograde perfusion catheter) on 11/25. Notably CTH that day revealed small subdural hemorrhage.     His post arrest TTE on 11/26 showed a signficantly worse LVEF of 5-10% with an AV that was only minimally opening. Since then he has had improvement in his LV function (now ~35-40%) and improved pulsatility while tolerating progressive slow ECMO weans. ECMO turndown (note in chart 11/30) was reassuring for ability to decannulate to IABP/inotropes. He is awaiting LHC (IABP at same time) when renal function improves and afterwards will tentatively plan for ECMO decannulation. His ARDS is improving.

## 2022-12-03 NOTE — PROGRESS NOTE ADULT - PROBLEM SELECTOR PLAN 1
- continue VA ECMO (LFV/RFA, no anterograde perfusion),   - monitor distal pulses closely  - ECMO wean performed on 11/30 and favorable, we would favor LHC followed by decannulation to IABP +/- inotropes with/without transition to VV. constant LV loading may be worsening pulmonary edema. MDR discussion to continue ECMO until to be able to more safely perform LHC given ERIC  - eventual LHC to assess coronaries and IABP pending improvement in renal function   - CT recc blood today. Wean nitric and discontinue Pa catheter  -F/U ECHO done this morning   - cont argatroban for AC while on ECMO - continue VA ECMO (LFV/RFA, no anterograde perfusion). Currently flowing at 3.6 L/min. monitor distal pulses closely  - ECMO wean performed on 11/30 and favorable, MDR discussion to continue ECMO until to be able to more safely perform LHC given ERIC (likely early next week). IABP to be placed at time of LHC for MCS weaning   - ok to remove PAC   - F/U ECHO done this morning   - cont argatroban for AC while on ECMO

## 2022-12-04 LAB
ALBUMIN SERPL ELPH-MCNC: 3.1 G/DL — LOW (ref 3.3–5)
ALBUMIN SERPL ELPH-MCNC: 3.3 G/DL — SIGNIFICANT CHANGE UP (ref 3.3–5)
ALP SERPL-CCNC: 206 U/L — HIGH (ref 40–120)
ALP SERPL-CCNC: 220 U/L — HIGH (ref 40–120)
ALT FLD-CCNC: 8 U/L — LOW (ref 10–45)
AMYLASE P1 CFR SERPL: 304 U/L — HIGH (ref 25–125)
ANION GAP SERPL CALC-SCNC: 14 MMOL/L — SIGNIFICANT CHANGE UP (ref 5–17)
ANION GAP SERPL CALC-SCNC: 15 MMOL/L — SIGNIFICANT CHANGE UP (ref 5–17)
APTT BLD: 47.2 SEC — HIGH (ref 27.5–35.5)
APTT BLD: 47.9 SEC — HIGH (ref 27.5–35.5)
AST SERPL-CCNC: 56 U/L — HIGH (ref 10–40)
AST SERPL-CCNC: 59 U/L — HIGH (ref 10–40)
BASE EXCESS BLDV CALC-SCNC: -2.4 MMOL/L — LOW (ref -2–3)
BASE EXCESS BLDV CALC-SCNC: -4.4 MMOL/L — LOW (ref -2–3)
BILIRUB SERPL-MCNC: 0.9 MG/DL — SIGNIFICANT CHANGE UP (ref 0.2–1.2)
BILIRUB SERPL-MCNC: 1 MG/DL — SIGNIFICANT CHANGE UP (ref 0.2–1.2)
BUN SERPL-MCNC: 93 MG/DL — HIGH (ref 7–23)
BUN SERPL-MCNC: 96 MG/DL — HIGH (ref 7–23)
CALCIUM SERPL-MCNC: 7.4 MG/DL — LOW (ref 8.4–10.5)
CALCIUM SERPL-MCNC: 7.8 MG/DL — LOW (ref 8.4–10.5)
CALCIUM SERPL-MCNC: 7.9 MG/DL — LOW (ref 8.4–10.5)
CHLORIDE SERPL-SCNC: 112 MMOL/L — HIGH (ref 96–108)
CHLORIDE SERPL-SCNC: 113 MMOL/L — HIGH (ref 96–108)
CO2 BLDV-SCNC: 22 MMOL/L — SIGNIFICANT CHANGE UP (ref 22–26)
CO2 BLDV-SCNC: 24 MMOL/L — SIGNIFICANT CHANGE UP (ref 22–26)
CO2 SERPL-SCNC: 20 MMOL/L — LOW (ref 22–31)
CO2 SERPL-SCNC: 21 MMOL/L — LOW (ref 22–31)
CREAT SERPL-MCNC: 3.68 MG/DL — HIGH (ref 0.5–1.3)
CREAT SERPL-MCNC: 3.79 MG/DL — HIGH (ref 0.5–1.3)
CREAT SERPL-MCNC: 3.81 MG/DL — HIGH (ref 0.5–1.3)
CULTURE RESULTS: SIGNIFICANT CHANGE UP
EGFR: 17 ML/MIN/1.73M2 — LOW
EGFR: 18 ML/MIN/1.73M2 — LOW
FIBRINOGEN PPP-MCNC: 171 MG/DL — LOW (ref 200–445)
GAS PNL BLDA: SIGNIFICANT CHANGE UP
GAS PNL BLDV: SIGNIFICANT CHANGE UP
GLUCOSE BLDC GLUCOMTR-MCNC: 104 MG/DL — HIGH (ref 70–99)
GLUCOSE BLDC GLUCOMTR-MCNC: 109 MG/DL — HIGH (ref 70–99)
GLUCOSE BLDC GLUCOMTR-MCNC: 114 MG/DL — HIGH (ref 70–99)
GLUCOSE BLDC GLUCOMTR-MCNC: 115 MG/DL — HIGH (ref 70–99)
GLUCOSE BLDC GLUCOMTR-MCNC: 122 MG/DL — HIGH (ref 70–99)
GLUCOSE BLDC GLUCOMTR-MCNC: 128 MG/DL — HIGH (ref 70–99)
GLUCOSE BLDC GLUCOMTR-MCNC: 130 MG/DL — HIGH (ref 70–99)
GLUCOSE BLDC GLUCOMTR-MCNC: 139 MG/DL — HIGH (ref 70–99)
GLUCOSE BLDC GLUCOMTR-MCNC: 142 MG/DL — HIGH (ref 70–99)
GLUCOSE BLDC GLUCOMTR-MCNC: 143 MG/DL — HIGH (ref 70–99)
GLUCOSE BLDC GLUCOMTR-MCNC: 147 MG/DL — HIGH (ref 70–99)
GLUCOSE BLDC GLUCOMTR-MCNC: 150 MG/DL — HIGH (ref 70–99)
GLUCOSE BLDC GLUCOMTR-MCNC: 160 MG/DL — HIGH (ref 70–99)
GLUCOSE BLDC GLUCOMTR-MCNC: 163 MG/DL — HIGH (ref 70–99)
GLUCOSE BLDC GLUCOMTR-MCNC: 164 MG/DL — HIGH (ref 70–99)
GLUCOSE BLDC GLUCOMTR-MCNC: 176 MG/DL — HIGH (ref 70–99)
GLUCOSE BLDC GLUCOMTR-MCNC: 182 MG/DL — HIGH (ref 70–99)
GLUCOSE BLDC GLUCOMTR-MCNC: 187 MG/DL — HIGH (ref 70–99)
GLUCOSE BLDC GLUCOMTR-MCNC: 207 MG/DL — HIGH (ref 70–99)
GLUCOSE SERPL-MCNC: 108 MG/DL — HIGH (ref 70–99)
GLUCOSE SERPL-MCNC: 122 MG/DL — HIGH (ref 70–99)
GLUCOSE SERPL-MCNC: 137 MG/DL — HIGH (ref 70–99)
GRAM STN FLD: SIGNIFICANT CHANGE UP
HAPTOGLOB SERPL-MCNC: 209 MG/DL — HIGH (ref 34–200)
HCO3 BLDV-SCNC: 21 MMOL/L — LOW (ref 22–29)
HCO3 BLDV-SCNC: 23 MMOL/L — SIGNIFICANT CHANGE UP (ref 22–29)
HCT VFR BLD CALC: 27.5 % — LOW (ref 39–50)
HCT VFR BLD CALC: 27.8 % — LOW (ref 39–50)
HCT VFR BLD CALC: 28.4 % — LOW (ref 39–50)
HGB BLD-MCNC: 9.1 G/DL — LOW (ref 13–17)
HGB BLD-MCNC: 9.2 G/DL — LOW (ref 13–17)
HGB BLD-MCNC: 9.3 G/DL — LOW (ref 13–17)
HOROWITZ INDEX BLDV+IHG-RTO: 100 — SIGNIFICANT CHANGE UP
INR BLD: 1.47 RATIO — HIGH (ref 0.88–1.16)
LDH SERPL L TO P-CCNC: 423 U/L — HIGH (ref 50–242)
LIDOCAIN IGE QN: 606 U/L — HIGH (ref 7–60)
MAGNESIUM SERPL-MCNC: 2.4 MG/DL — SIGNIFICANT CHANGE UP (ref 1.6–2.6)
MCHC RBC-ENTMCNC: 28.6 PG — SIGNIFICANT CHANGE UP (ref 27–34)
MCHC RBC-ENTMCNC: 28.7 PG — SIGNIFICANT CHANGE UP (ref 27–34)
MCHC RBC-ENTMCNC: 29 PG — SIGNIFICANT CHANGE UP (ref 27–34)
MCHC RBC-ENTMCNC: 32.7 GM/DL — SIGNIFICANT CHANGE UP (ref 32–36)
MCHC RBC-ENTMCNC: 33.1 GM/DL — SIGNIFICANT CHANGE UP (ref 32–36)
MCV RBC AUTO: 86.8 FL — SIGNIFICANT CHANGE UP (ref 80–100)
MCV RBC AUTO: 87.4 FL — SIGNIFICANT CHANGE UP (ref 80–100)
MCV RBC AUTO: 87.7 FL — SIGNIFICANT CHANGE UP (ref 80–100)
NRBC # BLD: 0 /100 WBCS — SIGNIFICANT CHANGE UP (ref 0–0)
PCO2 BLDV: 39 MMHG — LOW (ref 42–55)
PCO2 BLDV: 42 MMHG — SIGNIFICANT CHANGE UP (ref 42–55)
PH BLDV: 7.34 — SIGNIFICANT CHANGE UP (ref 7.32–7.43)
PH BLDV: 7.35 — SIGNIFICANT CHANGE UP (ref 7.32–7.43)
PHOSPHATE SERPL-MCNC: 5.6 MG/DL — HIGH (ref 2.5–4.5)
PLATELET # BLD AUTO: 114 K/UL — LOW (ref 150–400)
PLATELET # BLD AUTO: 122 K/UL — LOW (ref 150–400)
PLATELET # BLD AUTO: 125 K/UL — LOW (ref 150–400)
PO2 BLDV: 421 MMHG — HIGH (ref 25–45)
PO2 BLDV: 44 MMHG — SIGNIFICANT CHANGE UP (ref 25–45)
POTASSIUM SERPL-MCNC: 4.1 MMOL/L — SIGNIFICANT CHANGE UP (ref 3.5–5.3)
POTASSIUM SERPL-MCNC: 4.2 MMOL/L — SIGNIFICANT CHANGE UP (ref 3.5–5.3)
POTASSIUM SERPL-MCNC: 4.4 MMOL/L — SIGNIFICANT CHANGE UP (ref 3.5–5.3)
POTASSIUM SERPL-SCNC: 4.1 MMOL/L — SIGNIFICANT CHANGE UP (ref 3.5–5.3)
POTASSIUM SERPL-SCNC: 4.2 MMOL/L — SIGNIFICANT CHANGE UP (ref 3.5–5.3)
POTASSIUM SERPL-SCNC: 4.4 MMOL/L — SIGNIFICANT CHANGE UP (ref 3.5–5.3)
PROT SERPL-MCNC: 5.8 G/DL — LOW (ref 6–8.3)
PROTHROM AB SERPL-ACNC: 17.1 SEC — HIGH (ref 10.5–13.4)
RAPID RVP RESULT: SIGNIFICANT CHANGE UP
RAPIDTEG MAXIMUM AMPLITUDE: 43.7 MM (ref 52–70)
RBC # BLD: 3.17 M/UL — LOW (ref 4.2–5.8)
RBC # BLD: 3.25 M/UL — LOW (ref 4.2–5.8)
RBC # FLD: 15.5 % — HIGH (ref 10.3–14.5)
RBC # FLD: 15.6 % — HIGH (ref 10.3–14.5)
SAO2 % BLDV: 74.5 % — SIGNIFICANT CHANGE UP (ref 67–88)
SAO2 % BLDV: 99 % — HIGH (ref 67–88)
SARS-COV-2 RNA SPEC QL NAA+PROBE: SIGNIFICANT CHANGE UP
SODIUM SERPL-SCNC: 148 MMOL/L — HIGH (ref 135–145)
SPECIMEN SOURCE: SIGNIFICANT CHANGE UP
TEG FUNCTIONAL FIBRINOGEN: <4 MM (ref 15–32)
TEG LY30 (LYSIS): 0 % — SIGNIFICANT CHANGE UP (ref 0–2.6)
TEG REACTION TIME: 10.8 MIN (ref 4.6–9.1)
WBC # BLD: 14.18 K/UL — HIGH (ref 3.8–10.5)
WBC # BLD: 15.07 K/UL — HIGH (ref 3.8–10.5)
WBC # BLD: 15.31 K/UL — HIGH (ref 3.8–10.5)
WBC # FLD AUTO: 14.18 K/UL — HIGH (ref 3.8–10.5)
WBC # FLD AUTO: 15.07 K/UL — HIGH (ref 3.8–10.5)
WBC # FLD AUTO: 15.31 K/UL — HIGH (ref 3.8–10.5)

## 2022-12-04 PROCEDURE — 99291 CRITICAL CARE FIRST HOUR: CPT

## 2022-12-04 PROCEDURE — 99292 CRITICAL CARE ADDL 30 MIN: CPT

## 2022-12-04 PROCEDURE — 99232 SBSQ HOSP IP/OBS MODERATE 35: CPT | Mod: GC

## 2022-12-04 PROCEDURE — 71045 X-RAY EXAM CHEST 1 VIEW: CPT | Mod: 26

## 2022-12-04 PROCEDURE — 99232 SBSQ HOSP IP/OBS MODERATE 35: CPT

## 2022-12-04 RX ORDER — SODIUM BICARBONATE 1 MEQ/ML
50 SYRINGE (ML) INTRAVENOUS ONCE
Refills: 0 | Status: COMPLETED | OUTPATIENT
Start: 2022-12-04 | End: 2022-12-04

## 2022-12-04 RX ORDER — BUMETANIDE 0.25 MG/ML
2 INJECTION INTRAMUSCULAR; INTRAVENOUS ONCE
Refills: 0 | Status: COMPLETED | OUTPATIENT
Start: 2022-12-04 | End: 2022-12-04

## 2022-12-04 RX ORDER — CALCIUM CHLORIDE
1000 POWDER (GRAM) MISCELLANEOUS ONCE
Refills: 0 | Status: COMPLETED | OUTPATIENT
Start: 2022-12-04 | End: 2022-12-04

## 2022-12-04 RX ADMIN — INSULIN HUMAN 6 UNIT(S)/HR: 100 INJECTION, SOLUTION SUBCUTANEOUS at 12:36

## 2022-12-04 RX ADMIN — ARGATROBAN 0.56 MICROGRAM(S)/KG/MIN: 50 INJECTION, SOLUTION INTRAVENOUS at 12:36

## 2022-12-04 RX ADMIN — PANTOPRAZOLE SODIUM 40 MILLIGRAM(S): 20 TABLET, DELAYED RELEASE ORAL at 05:09

## 2022-12-04 RX ADMIN — PROPOFOL 10 MICROGRAM(S)/KG/MIN: 10 INJECTION, EMULSION INTRAVENOUS at 21:03

## 2022-12-04 RX ADMIN — INSULIN HUMAN 6 UNIT(S)/HR: 100 INJECTION, SOLUTION SUBCUTANEOUS at 21:03

## 2022-12-04 RX ADMIN — Medication 1 DROP(S): at 05:11

## 2022-12-04 RX ADMIN — Medication 1 DROP(S): at 12:35

## 2022-12-04 RX ADMIN — Medication 250 MILLIGRAM(S): at 00:11

## 2022-12-04 RX ADMIN — DEXMEDETOMIDINE HYDROCHLORIDE IN 0.9% SODIUM CHLORIDE 34.8 MICROGRAM(S)/KG/HR: 4 INJECTION INTRAVENOUS at 21:03

## 2022-12-04 RX ADMIN — CHLORHEXIDINE GLUCONATE 1 APPLICATION(S): 213 SOLUTION TOPICAL at 05:11

## 2022-12-04 RX ADMIN — MEROPENEM 100 MILLIGRAM(S): 1 INJECTION INTRAVENOUS at 05:10

## 2022-12-04 RX ADMIN — ATORVASTATIN CALCIUM 40 MILLIGRAM(S): 80 TABLET, FILM COATED ORAL at 21:02

## 2022-12-04 RX ADMIN — Medication 50 MILLIEQUIVALENT(S): at 18:56

## 2022-12-04 RX ADMIN — DEXMEDETOMIDINE HYDROCHLORIDE IN 0.9% SODIUM CHLORIDE 34.8 MICROGRAM(S)/KG/HR: 4 INJECTION INTRAVENOUS at 12:36

## 2022-12-04 RX ADMIN — PROPOFOL 10 MICROGRAM(S)/KG/MIN: 10 INJECTION, EMULSION INTRAVENOUS at 12:36

## 2022-12-04 RX ADMIN — CHLORHEXIDINE GLUCONATE 15 MILLILITER(S): 213 SOLUTION TOPICAL at 05:09

## 2022-12-04 RX ADMIN — Medication 8.71 MICROGRAM(S)/KG/MIN: at 21:03

## 2022-12-04 RX ADMIN — SENNA PLUS 2 TABLET(S): 8.6 TABLET ORAL at 21:02

## 2022-12-04 RX ADMIN — CHLORHEXIDINE GLUCONATE 15 MILLILITER(S): 213 SOLUTION TOPICAL at 17:03

## 2022-12-04 RX ADMIN — Medication 1 DROP(S): at 23:05

## 2022-12-04 RX ADMIN — ARGATROBAN 0.56 MICROGRAM(S)/KG/MIN: 50 INJECTION, SOLUTION INTRAVENOUS at 21:03

## 2022-12-04 RX ADMIN — PANTOPRAZOLE SODIUM 40 MILLIGRAM(S): 20 TABLET, DELAYED RELEASE ORAL at 17:03

## 2022-12-04 RX ADMIN — SODIUM CHLORIDE 5 MILLILITER(S): 9 INJECTION INTRAMUSCULAR; INTRAVENOUS; SUBCUTANEOUS at 12:37

## 2022-12-04 RX ADMIN — Medication 1 DROP(S): at 17:03

## 2022-12-04 RX ADMIN — POLYETHYLENE GLYCOL 3350 17 GRAM(S): 17 POWDER, FOR SOLUTION ORAL at 12:35

## 2022-12-04 RX ADMIN — BUMETANIDE 2 MILLIGRAM(S): 0.25 INJECTION INTRAMUSCULAR; INTRAVENOUS at 19:04

## 2022-12-04 RX ADMIN — Medication 8.71 MICROGRAM(S)/KG/MIN: at 12:36

## 2022-12-04 RX ADMIN — Medication 1000 MILLIGRAM(S): at 23:00

## 2022-12-04 RX ADMIN — Medication 50 MILLIEQUIVALENT(S): at 06:44

## 2022-12-04 NOTE — PROGRESS NOTE ADULT - PROBLEM SELECTOR PLAN 1
Pt with non oliguric ERIC/ ATN in the setting of STEMI, cardiac arrest & cardiogenic shock  SCr on arrival was 2.15; trended down to hector 1.6 on 11/25;  slowly trended up & peaked to 3.95 11/28.     SCr increased to 3.68 from 3.6.  Urinalysis done 12/3, did not show difference from prior UA. Pt with significant UOP of ~3L in last 24 hours. CXR with improvement in volume status and CVP is lower but still with significant LE swelling.  Pt currently off diuretics per CTU and cardiology.  Labs reviewed. Strict I/O. Monitor labs and urine output. Avoid NSAIDs, RCA and nephrotoxins. Dose medications as per eGFR. No emergent need for HD as patient non-oliguric and volume status is improving, but will discuss with CTU.

## 2022-12-04 NOTE — PROGRESS NOTE ADULT - SUBJECTIVE AND OBJECTIVE BOX
Patient seen and examined at the bedside.    Remained critically ill on continuous ICU monitoring.    24 Hour events:  - May start on CVVHD, f/u      OBJECTIVE:  Vital Signs Last 24 Hrs  T(C): 37.3 (04 Dec 2022 20:00), Max: 37.3 (04 Dec 2022 16:00)  T(F): 99.1 (04 Dec 2022 20:00), Max: 99.1 (04 Dec 2022 16:00)  HR: 80 (04 Dec 2022 20:15) (67 - 87)  BP: --  BP(mean): --  RR: 18 (04 Dec 2022 20:15) (13 - 20)  SpO2: 100% (04 Dec 2022 20:15) (100% - 100%)    Parameters below as of 04 Dec 2022 20:00  Patient On (Oxygen Delivery Method): ventilator    O2 Concentration (%): 40      Physical Exam:   General: Intubated, multiple lines gtt  Neurology: Sedated  Eyes: bilateral pupils equal and reactive   ENT/Neck: +ETT midline, Neck supple, trachea midline, No JVD   Respiratory: rhonchi bilaterally   CV: S1S2, no murmurs        [x] Sinus rhythm  Abdominal: Soft, NT, ND +BS   Extremities: 1+ pedal edema noted, + peripheral pulses   Skin: No Rashes, Hematoma, Ecchymosis                                    Assessment:  63 y/o male with PMH of CABG, DM, HTN, HLD presenting as transfer from [facility] for c/f STEMI. PTA arrival received 325 aspirin, 600 plavix, and no heparin drip started. Around 1:30 am patient had multiple episodes of non-bloody diarrhea and emesis with associated abdominal pain and chest pain, unable to localize, non-radiating, with associated SOB and no associated palpitations or diaphoresis. No recent fevers/chills, cough, rashes, or changes in urination. Does report mild diffuse back pain; started 5 days ago in setting on injury while walking and lifting objects. Denies focal weakness, numbness/tingling, or LOC due does report mild dizziness.    Cardiac arrest s/p [facility] ECMO 11/25  Hemodynamic instability  Hypovolemia  Post op respiratory insufficiency  Thrombocytopenia  Acute blood loss anemia  Leukocytosis         Plan:   ***Neuro***  CT head showed 4mm subdural hematoma 11/26: repeat CT head unchanged 12/01  [x] Sedated with [x] Precedex and propofol   Post operative neuro assessment   Dilaudid for pain      ***Cardiovascular***  12/3 TTE: EF 30-35%, moderate-severe LV dysfunction  Invasive hemodynamic monitoring, assess perfusion indices   SR / CVP 6/ MAP 66/ Hct 27.5/ Lactate 0.8  [x] Levophed 0.05 mcg/kg/min  [x] ECMO- 3600 rpm, 4.1 flow  Continuos reassessment of hemodynamics   [x] Statin  [x] AC Therapy with Argatroban gtt (PTT 40-45)  ?cardiac cath when renal function stabilizes          ***Pulmonary***  Post op vent management   Titration of FiO2 and PEEP, follow SpO2, CXR, blood gasses   100% FiO2 on ECMO  Nitric oxide weaned off yesterday      Mode: AC/ CMV (Assist Control/ Continuous Mandatory Ventilation)  RR (machine): 14  FiO2: 40  PEEP: 12  ITime: 1.4  MAP: 15  PC: 10  PIP: 23              ***GI***  [x] NPO with TF's Vital AF @goal of 20cc/hr  [x] Protonix    Bowel regimen with Miralax and senna       ***Renal***  ERIC, renal following  Continue to monitor I/Os, BUN/Creatinine.   Replete lytes PRN  De Dios present  Free Water 300 q4hr  May start on CVVHD, f/u    ***ID***  Tmax 99F on CVVH -- presumed fever, with leukocytosis, central line changed yesterday  Meropenem for empiric coverage--> now d/c, monitor off abx        ***Endocrine***  [x]  DM2: HbA1c 7.3%                - [x] Insulin gtt              - Need tight glycemic control to prevent wound infection.            Patient requires continuous monitoring with bedside rhythm monitoring, pulse oximetry monitoring, and continuous central venous and arterial pressure monitoring; and intermittent blood gas analysis. Care plan discussed with the ICU care team.   Patient remained critical, at risk for life threatening decompensation.    I have spent 40 minutes providing critical care management to this patient.    By signing my name below, I, Abhijit Ngo, attest that this documentation has been prepared under the direction and in the presence of Bela Sanchez NP   Electronically signed: Brian Slaughter, 12-04-22 @ 20:24    JOHNATHAN, Bela Sanchez NP, personally performed the services described in this documentation. all medical record entries made by the scribe were at my direction and in my presence. I have reviewed the chart and agree that the record reflects my personal performance and is accurate and complete  Electronically signed: Bela Sanchez NP  Patient seen and examined at the bedside.    Remained critically ill on continuous ICU monitoring.    24 Hour events:  - May start on CVVHD, f/u      OBJECTIVE:  Vital Signs Last 24 Hrs  T(C): 37.3 (04 Dec 2022 20:00), Max: 37.3 (04 Dec 2022 16:00)  T(F): 99.1 (04 Dec 2022 20:00), Max: 99.1 (04 Dec 2022 16:00)  HR: 80 (04 Dec 2022 20:15) (67 - 87)  BP: --  BP(mean): --  RR: 18 (04 Dec 2022 20:15) (13 - 20)  SpO2: 100% (04 Dec 2022 20:15) (100% - 100%)    Parameters below as of 04 Dec 2022 20:00  Patient On (Oxygen Delivery Method): ventilator    O2 Concentration (%): 40      Physical Exam:   General: Intubated, multiple lines gtt  Neurology: Sedated  Eyes: bilateral pupils equal and reactive   ENT/Neck: +ETT midline, Neck supple, trachea midline, No JVD   Respiratory: rhonchi bilaterally   CV: S1S2, no murmurs        [x] Sinus rhythm  Abdominal: Soft, NT, ND +BS   Extremities: 1+ pedal edema noted, + peripheral pulses   Skin: No Rashes, Hematoma, Ecchymosis                                    Assessment:  61 y/o male with PMH of CABG, DM, HTN, HLD presenting as transfer from [place] for c/f STEMI. PTA arrival received 325 aspirin, 600 plavix, and no heparin drip started. Around 1:30 am patient had multiple episodes of non-bloody diarrhea and emesis with associated abdominal pain and chest pain, unable to localize, non-radiating, with associated SOB and no associated palpitations or diaphoresis. No recent fevers/chills, cough, rashes, or changes in urination. Does report mild diffuse back pain; started 5 days ago in setting on injury while walking and lifting objects. Denies focal weakness, numbness/tingling, or LOC due does report mild dizziness.    Cardiac arrest s/p [facility] ECMO 11/25  Hemodynamic instability  Hypovolemia  Post op respiratory insufficiency  Thrombocytopenia  Acute blood loss anemia  Leukocytosis         Plan:   ***Neuro***  CT head showed 4mm subdural hematoma 11/26: repeat CT head unchanged 12/01  [x] Sedated with [x] Precedex and propofol   Post operative neuro assessment   Dilaudid for pain      ***Cardiovascular***  12/3 TTE: EF 30-35%, moderate-severe LV dysfunction  Invasive hemodynamic monitoring, assess perfusion indices   SR / CVP 6/ MAP 66/ Hct 27.5/ Lactate 0.8  [x] Levophed 0.05 mcg/kg/min  [x] ECMO- 3600 rpm, 4.1 flow  Continuos reassessment of hemodynamics   [x] Statin  [x] AC Therapy with Argatroban gtt (PTT 40-45)  ?cardiac cath when renal function stabilizes          ***Pulmonary***  Post op vent management   Titration of FiO2 and PEEP, follow SpO2, CXR, blood gasses   100% FiO2 on ECMO  Nitric oxide weaned off yesterday      Mode: AC/ CMV (Assist Control/ Continuous Mandatory Ventilation)  RR (machine): 14  FiO2: 40  PEEP: 12  ITime: 1.4  MAP: 15  PC: 10  PIP: 23              ***GI***  [x] NPO with TF's Vital AF @goal of 20cc/hr  [x] Protonix    Bowel regimen with Miralax and senna       ***Renal***  ERIC, renal following  Continue to monitor I/Os, BUN/Creatinine.   Replete lytes PRN  De Dios present  Free Water 300 q4hr  May start on CVVHD, f/u    ***ID***  Tmax 99F on CVVH -- presumed fever, with leukocytosis, central line changed yesterday  Meropenem for empiric coverage--> now d/c, monitor off abx        ***Endocrine***  [x]  DM2: HbA1c 7.3%                - [x] Insulin gtt              - Need tight glycemic control to prevent wound infection.            Patient requires continuous monitoring with bedside rhythm monitoring, pulse oximetry monitoring, and continuous central venous and arterial pressure monitoring; and intermittent blood gas analysis. Care plan discussed with the ICU care team.   Patient remained critical, at risk for life threatening decompensation.    I have spent 40 minutes providing critical care management to this patient.    By signing my name below, I, Abhijit Ngo, attest that this documentation has been prepared under the direction and in the presence of Bela Sanchez NP   Electronically signed: Brian Slaughter, 12-04-22 @ 20:24    JOHNATHAN, Bela Sanchez NP, personally performed the services described in this documentation. all medical record entries made by the scribe were at my direction and in my presence. I have reviewed the chart and agree that the record reflects my personal performance and is accurate and complete  Electronically signed: Bela Sanchez NP  Patient seen and examined at the bedside.    Remained critically ill on continuous ICU monitoring.    24 Hour events:  - May start on CVVHD, f/u      OBJECTIVE:  Vital Signs Last 24 Hrs  T(C): 37.3 (04 Dec 2022 20:00), Max: 37.3 (04 Dec 2022 16:00)  T(F): 99.1 (04 Dec 2022 20:00), Max: 99.1 (04 Dec 2022 16:00)  HR: 80 (04 Dec 2022 20:15) (67 - 87)  BP: --  BP(mean): --  RR: 18 (04 Dec 2022 20:15) (13 - 20)  SpO2: 100% (04 Dec 2022 20:15) (100% - 100%)    Parameters below as of 04 Dec 2022 20:00  Patient On (Oxygen Delivery Method): ventilator    O2 Concentration (%): 40      Physical Exam:   General: Intubated, multiple lines gtt  Neurology: Sedated  Eyes: bilateral pupils equal and reactive   ENT/Neck: +ETT midline, Neck supple, trachea midline, No JVD   Respiratory: rhonchi bilaterally   CV: S1S2, no murmurs        [x] Sinus rhythm  Abdominal: Soft, NT, ND +BS   Extremities: 1+ pedal edema noted, + peripheral pulses   Skin: No Rashes, Hematoma, Ecchymosis                                    Assessment:  63 y/o male with PMH of CABG, DM, HTN, HLD presenting as transfer from [place] for c/f STEMI. PTA arrival received 325 aspirin, 600 plavix, and no heparin drip started. Around 1:30 am patient had multiple episodes of non-bloody diarrhea and emesis with associated abdominal pain and chest pain, unable to localize, non-radiating, with associated SOB and no associated palpitations or diaphoresis. No recent fevers/chills, cough, rashes, or changes in urination. Does report mild diffuse back pain; started 5 days ago in setting on injury while walking and lifting objects. Denies focal weakness, numbness/tingling, or LOC due does report mild dizziness.    Cardiac arrest s/p VA ECMO 11/25  Hemodynamic instability  Hypovolemia  Post op respiratory insufficiency  Thrombocytopenia  Acute blood loss anemia  Leukocytosis         Plan:   ***Neuro***  CT head showed 4mm subdural hematoma 11/26: repeat CT head unchanged 12/01  [x] Sedated with [x] Precedex and propofol   Post operative neuro assessment   Dilaudid for pain      ***Cardiovascular***  12/3 TTE: EF 30-35%, moderate-severe LV dysfunction  Invasive hemodynamic monitoring, assess perfusion indices   SR / CVP 6/ MAP 66/ Hct 27.5/ Lactate 0.8  [x] Levophed 0.05 mcg/kg/min  [x] ECMO- 3600 rpm, 4.1 flow  Continuos reassessment of hemodynamics   [x] Statin  [x] AC Therapy with Argatroban gtt (PTT 40-45)  ?cardiac cath when renal function stabilizes          ***Pulmonary***  Post op vent management   Titration of FiO2 and PEEP, follow SpO2, CXR, blood gasses   100% FiO2 on ECMO  Nitric oxide weaned off yesterday      Mode: AC/ CMV (Assist Control/ Continuous Mandatory Ventilation)  RR (machine): 14  FiO2: 40  PEEP: 12  ITime: 1.4  MAP: 15  PC: 10  PIP: 23              ***GI***  [x] NPO with TF's Vital AF @goal of 20cc/hr  [x] Protonix    Bowel regimen with Miralax and senna       ***Renal***  ERIC, renal following  Continue to monitor I/Os, BUN/Creatinine.   Replete lytes PRN  De Dios present  Free Water 300 q4hr  May start on CVVHD, f/u    ***ID***  Tmax 99F on CVVH -- presumed fever, with leukocytosis, central line changed yesterday  Meropenem for empiric coverage--> now d/c, monitor off abx        ***Endocrine***  [x]  DM2: HbA1c 7.3%                - [x] Insulin gtt              - Need tight glycemic control to prevent wound infection.            Patient requires continuous monitoring with bedside rhythm monitoring, pulse oximetry monitoring, and continuous central venous and arterial pressure monitoring; and intermittent blood gas analysis. Care plan discussed with the ICU care team.   Patient remained critical, at risk for life threatening decompensation.    I have spent 40 minutes providing critical care management to this patient.    By signing my name below, I, Abhijit Ngo, attest that this documentation has been prepared under the direction and in the presence of Bela Sanchez NP   Electronically signed: Brian Slaughter, 12-04-22 @ 20:24    JOHNATHAN, Bela Sanchez NP, personally performed the services described in this documentation. all medical record entries made by the scribe were at my direction and in my presence. I have reviewed the chart and agree that the record reflects my personal performance and is accurate and complete  Electronically signed: Bela Sanchez NP  Patient seen and examined at the bedside.    Remained critically ill on continuous ICU monitoring.    24 Hour events:  - meropenum dc'd  - BUN/SCr stable, non-oliguric, bumex x1 given.      OBJECTIVE:  Vital Signs Last 24 Hrs  T(C): 37.3 (04 Dec 2022 20:00), Max: 37.3 (04 Dec 2022 16:00)  T(F): 99.1 (04 Dec 2022 20:00), Max: 99.1 (04 Dec 2022 16:00)  HR: 80 (04 Dec 2022 20:15) (67 - 87)  BP: --  BP(mean): --  RR: 18 (04 Dec 2022 20:15) (13 - 20)  SpO2: 100% (04 Dec 2022 20:15) (100% - 100%)    Parameters below as of 04 Dec 2022 20:00  Patient On (Oxygen Delivery Method): ventilator    O2 Concentration (%): 40      Physical Exam:   General: Intubated, multiple lines gtt  Neurology: Sedated, off sedation follows simple commands, COBB x4  Eyes: bilateral pupils equal and reactive   ENT/Neck: +ETT midline, Neck supple, trachea midline, No JVD   Respiratory: rhonchi bilaterally   CV: S1S2, no murmurs        [x] Sinus rhythm  Abdominal: Softly distended,+BS   Extremities: 1+ pedal edema noted, + peripheral pulses palpable, warm  Skin: No Rashes, Hematoma, Ecchymosis                                    Assessment:  63 y/o male with PMH of CABG, DM, HTN, HLD presenting as transfer from [place] for c/f STEMI. PTA arrival received 325 aspirin, 600 plavix, and no heparin drip started. Around 1:30 am patient had multiple episodes of non-bloody diarrhea and emesis with associated abdominal pain and chest pain, unable to localize, non-radiating, with associated SOB and no associated palpitations or diaphoresis. No recent fevers/chills, cough, rashes, or changes in urination. Does report mild diffuse back pain; started 5 days ago in setting on injury while walking and lifting objects. Denies focal weakness, numbness/tingling, or LOC due does report mild dizziness.    Cardiac arrest s/p VA ECMO 11/25  Hemodynamic instability  Hypovolemia  Post op respiratory insufficiency  Thrombocytopenia  Acute blood loss anemia  Leukocytosis         Plan:   ***Neuro***  CT head showed 4mm subdural hematoma 11/26: repeat CT head unchanged 12/01  [x] Sedated with [x] Precedex and propofol   Post operative neuro assessment   Dilaudid for pain      ***Cardiovascular***  12/3 TTE: EF 30-35%, moderate-severe LV dysfunction  Invasive hemodynamic monitoring, assess perfusion indices   SR / CVP 6/ MAP 66/ Hct 27.5/ Lactate 0.8  [x] Levophed 0.05 mcg/kg/min, MAP goal 65-75  [x] ECMO- 3600 rpm, 4.1 flow  Continuos reassessment of hemodynamics   [x] Statin  [x] AC Therapy with Argatroban gtt (PTT 40-45)  ?cardiac cath when renal function stabilizes          ***Pulmonary***  Post op vent management   Titration of FiO2 and PEEP, follow SpO2, CXR, blood gasses   100% FiO2 on ECMO  Nitric oxide weaned off yesterday 12/3      Mode: AC/ CMV (Assist Control/ Continuous Mandatory Ventilation)  RR (machine): 14  FiO2: 40  PEEP: 12  ITime: 1.4  MAP: 15  PC: 10  PIP: 23              ***GI***  [x] NPO with TF's Vital AF @ 20cc/hr (@goal)  [x] Protonix    Bowel regimen with Miralax and senna       ***Renal***  ERIC, renal following  Non oliguric, no acute indication for dialysis at this time  Even fluid goal  Continue to monitor I/Os, BUN/Creatinine.   Replete lytes PRN  Va present  hypernatremia, Free Water [id number]      ***ID***  Meropenem for empiric coverage--> DC'd today, monitor off abx        ***Endocrine***  [x]  DM2: HbA1c 7.3%                - [x] Insulin gtt              - Need tight glycemic control to prevent wound infection.    ***GOC***  Next family meeting 12/6 @ 130pm            Patient requires continuous monitoring with bedside rhythm monitoring, pulse oximetry monitoring, and continuous central venous and arterial pressure monitoring; and intermittent blood gas analysis. Care plan discussed with the ICU care team.   Patient remained critical, at risk for life threatening decompensation.    I have spent 40 minutes providing critical care management to this patient.    By signing my name below, I, Abhijit Ngo, attest that this documentation has been prepared under the direction and in the presence of Bela Sanchez NP   Electronically signed: Brian Slaughter, 12-04-22 @ 20:24    I, Bela Sanchez NP, personally performed the services described in this documentation. all medical record entries made by the karlaibkyle were at my direction and in my presence. I have reviewed the chart and agree that the record reflects my personal performance and is accurate and complete  Electronically signed: Bela Sanchez NP  Patient seen and examined at the bedside.    Remained critically ill on continuous ICU monitoring.    24 Hour events:  - meropenum dc'd  - BUN/SCr stable, non-oliguric, bumex x1 given.      OBJECTIVE:  Vital Signs Last 24 Hrs  T(C): 37.3 (04 Dec 2022 20:00), Max: 37.3 (04 Dec 2022 16:00)  T(F): 99.1 (04 Dec 2022 20:00), Max: 99.1 (04 Dec 2022 16:00)  HR: 80 (04 Dec 2022 20:15) (67 - 87)  BP: --  BP(mean): --  RR: 18 (04 Dec 2022 20:15) (13 - 20)  SpO2: 100% (04 Dec 2022 20:15) (100% - 100%)    Parameters below as of 04 Dec 2022 20:00  Patient On (Oxygen Delivery Method): ventilator    O2 Concentration (%): 40      Physical Exam:   General: Intubated, multiple lines gtt  Neurology: Sedated, off sedation follows simple commands, COBB x4  Eyes: bilateral pupils equal and reactive   ENT/Neck: +ETT midline, Neck supple, trachea midline, No JVD   Respiratory: rhonchi bilaterally   CV: S1S2, no murmurs        [x] Sinus rhythm  Abdominal: Softly distended,+BS   Extremities: 1+ pedal edema noted, + peripheral pulses palpable, warm  Skin: No Rashes, Hematoma, Ecchymosis                                    Assessment:  63 y/o male with PMH of CABG, DM, HTN, HLD presenting as transfer from [place] for c/f STEMI. PTA arrival received 325 aspirin, 600 plavix, and no heparin drip started. Around 1:30 am patient had multiple episodes of non-bloody diarrhea and emesis with associated abdominal pain and chest pain, unable to localize, non-radiating, with associated SOB and no associated palpitations or diaphoresis. No recent fevers/chills, cough, rashes, or changes in urination. Does report mild diffuse back pain; started 5 days ago in setting on injury while walking and lifting objects. Denies focal weakness, numbness/tingling, or LOC due does report mild dizziness.    Cardiac arrest s/p VA ECMO 11/25  Hemodynamic instability  Hypovolemia  Post op respiratory insufficiency  Thrombocytopenia  Acute blood loss anemia  Leukocytosis         Plan:   ***Neuro***  CT head showed 4mm subdural hematoma 11/26: repeat CT head unchanged 12/01  [x] Sedated with [x] Precedex and propofol   Post operative neuro assessment   Dilaudid for pain      ***Cardiovascular***  12/3 TTE: EF 30-35%, moderate-severe LV dysfunction  Invasive hemodynamic monitoring, assess perfusion indices   SR / CVP 6/ MAP 66/ Hct 27.5/ Lactate 0.8  [x] Levophed 0.05 mcg/kg/min, MAP goal 65-75  [x] ECMO- 3600 rpm, 4.1 flow  Continuos reassessment of hemodynamics   [x] Statin  [x] AC Therapy with Argatroban gtt (PTT 40-45)  ?cardiac cath when renal function stabilizes          ***Pulmonary***  Post op vent management   Titration of FiO2 and PEEP, follow SpO2, CXR, blood gasses   100% FiO2 on ECMO  Nitric oxide weaned off yesterday 12/3      Mode: AC/ CMV (Assist Control/ Continuous Mandatory Ventilation)  RR (machine): 14  FiO2: 40  PEEP: 12  ITime: 1.4  MAP: 15  PC: 10  PIP: 23              ***GI***  [x] NPO with TF's Vital AF @ 20cc/hr (@goal)  [x] Protonix    Bowel regimen with Miralax and senna       ***Renal***  ERIC, renal following  Non oliguric, no acute indication for dialysis at this time  Even fluid goal  Continue to monitor I/Os, BUN/Creatinine.   Replete lytes PRN  Va present  hypernatremia, Free Water [id number]      ***ID***  Meropenem for empiric coverage--> DC'd today, monitor off abx        ***Endocrine***  [x]  DM2: HbA1c 7.3%                - [x] Insulin gtt              - Need tight glycemic control to prevent wound infection.    ***GOC***  Next family meeting 12/6 @ 130pm            Patient requires continuous monitoring with bedside rhythm monitoring, pulse oximetry monitoring, and continuous central venous and arterial pressure monitoring; and intermittent blood gas analysis. Care plan discussed with the ICU care team.   Patient remained critical, at risk for life threatening decompensation.    I have spent 40 minutes providing critical care management to this patient.    By signing my name below, I, Abhijit Ngo, attest that this documentation has been prepared under the direction and in the presence of Bela Sanchez NP   Electronically signed: Brian Slaughter, 12-04-22 @ 20:24    I, Bela Sanchez NP, personally performed the services described in this documentation. all medical record entries made by the karlaibkyle were at my direction and in my presence. I have reviewed the chart and agree that the record reflects my personal performance and is accurate and complete  Electronically signed: Bela Sanchez NP  Patient seen and examined at the bedside.    Remained critically ill on continuous ICU monitoring.    24 Hour events:  - meropenum dc'd  - BUN/SCr stable, non-oliguric, bumex x1 given.      OBJECTIVE:  Vital Signs Last 24 Hrs  T(C): 37.3 (04 Dec 2022 20:00), Max: 37.3 (04 Dec 2022 16:00)  T(F): 99.1 (04 Dec 2022 20:00), Max: 99.1 (04 Dec 2022 16:00)  HR: 80 (04 Dec 2022 20:15) (67 - 87)  BP: --  BP(mean): --  RR: 18 (04 Dec 2022 20:15) (13 - 20)  SpO2: 100% (04 Dec 2022 20:15) (100% - 100%)    Parameters below as of 04 Dec 2022 20:00  Patient On (Oxygen Delivery Method): ventilator    O2 Concentration (%): 40      Physical Exam:   General: Intubated, multiple lines gtt  Neurology: Sedated, off sedation follows simple commands, COBB x4  Eyes: bilateral pupils equal and reactive   ENT/Neck: +ETT midline, Neck supple, trachea midline, No JVD   Respiratory: rhonchi bilaterally   CV: S1S2, no murmurs        [x] Sinus rhythm  Abdominal: Softly distended,+BS   Extremities: 1+ pedal edema noted, + peripheral pulses palpable, warm  Skin: No Rashes, Hematoma, Ecchymosis                                    Assessment:  61 y/o male with PMH of CABG, DM, HTN, HLD presenting as transfer from [place] for c/f STEMI. PTA arrival received 325 aspirin, 600 plavix, and no heparin drip started. Around 1:30 am patient had multiple episodes of non-bloody diarrhea and emesis with associated abdominal pain and chest pain, unable to localize, non-radiating, with associated SOB and no associated palpitations or diaphoresis. No recent fevers/chills, cough, rashes, or changes in urination. Does report mild diffuse back pain; started 5 days ago in setting on injury while walking and lifting objects. Denies focal weakness, numbness/tingling, or LOC due does report mild dizziness.    Cardiac arrest s/p VA ECMO 11/25  Hemodynamic instability  Hypovolemia  Post op respiratory insufficiency  Thrombocytopenia  Acute blood loss anemia  Leukocytosis         Plan:   ***Neuro***  CT head showed 4mm subdural hematoma 11/26: repeat CT head unchanged 12/01  [x] Sedated with [x] Precedex and propofol   Post operative neuro assessment   Dilaudid for pain      ***Cardiovascular***  12/3 TTE: EF 30-35%, moderate-severe LV dysfunction  Invasive hemodynamic monitoring, assess perfusion indices   SR / CVP 6/ MAP 66/ Hct 27.5/ Lactate 0.8  [x] Levophed 0.05 mcg/kg/min, MAP goal 65-75  [x] ECMO- 3600 rpm, 4.1 flow  Continuos reassessment of hemodynamics   [x] Statin  [x] AC Therapy with Argatroban gtt (PTT 40-45)  ?cardiac cath when renal function stabilizes          ***Pulmonary***  Post op vent management   Titration of FiO2 and PEEP, follow SpO2, CXR, blood gasses   100% FiO2 on ECMO  Nitric oxide weaned off yesterday 12/3      Mode: AC/ CMV (Assist Control/ Continuous Mandatory Ventilation)  RR (machine): 14  FiO2: 40  PEEP: 12  ITime: 1.4  MAP: 15  PC: 10  PIP: 23              ***GI***  [x] NPO with TF's Vital AF @ 20cc/hr (@goal)  [x] Protonix    Bowel regimen with Miralax and senna       ***Renal***  ERIC, renal following  Non oliguric, no acute indication for dialysis at this time  Even fluid goal  Continue to monitor I/Os, BUN/Creatinine.   Replete lytes PRN  Va present  hypernatremia, Free Water [id number]      ***ID***  Meropenem for empiric coverage--> DC'd today, monitor off abx        ***Endocrine***  [x]  DM2: HbA1c 7.3%                - [x] Insulin gtt              - Need tight glycemic control to prevent wound infection.    ***GOC***  Next family meeting 12/6 @ 130pm            Patient requires continuous monitoring with bedside rhythm monitoring, pulse oximetry monitoring, and continuous central venous and arterial pressure monitoring; and intermittent blood gas analysis. Care plan discussed with the ICU care team.   Patient remained critical, at risk for life threatening decompensation.    I have spent 40 minutes providing critical care management to this patient.    By signing my name below, I, Abhijit Ngo, attest that this documentation has been prepared under the direction and in the presence of Bela Sanchez NP   Electronically signed: Brian Slaughter, 12-04-22 @ 20:24    I, Bela Sanchez NP, personally performed the services described in this documentation. all medical record entries made by the karlaibkyle were at my direction and in my presence. I have reviewed the chart and agree that the record reflects my personal performance and is accurate and complete  Electronically signed: Bela Sanchez NP

## 2022-12-04 NOTE — PROGRESS NOTE ADULT - SUBJECTIVE AND OBJECTIVE BOX
infectious diseases progress note:    Patient is a 62y old  Male who presents with a chief complaint of Acute cholecystitis, gallstone pancreatitis (04 Dec 2022 08:51)        Acute pancreatitis without infection or necrosis               Allergies    No Known Allergies    Intolerances        ANTIBIOTICS/RELEVANT:  antimicrobials  meropenem  IVPB 500 milliGRAM(s) IV Intermittent every 12 hours    immunologic:    OTHER:  argatroban Infusion 0.1 MICROgram(s)/kG/Min IV Continuous <Continuous>  artificial  tears Solution 1 Drop(s) Both EYES four times a day  atorvastatin 40 milliGRAM(s) Oral at bedtime  chlorhexidine 0.12% Liquid 15 milliLiter(s) Oral Mucosa every 12 hours  chlorhexidine 2% Cloths 1 Application(s) Topical <User Schedule>  dexMEDEtomidine Infusion 1.5 MICROgram(s)/kG/Hr IV Continuous <Continuous>  dextrose 50% Injectable 50 milliLiter(s) IV Push every 15 minutes  HYDROmorphone  Injectable 0.5 milliGRAM(s) IV Push every 4 hours PRN  insulin regular Infusion 6 Unit(s)/Hr IV Continuous <Continuous>  niCARdipine Infusion 2 mG/Hr IV Continuous <Continuous>  norepinephrine Infusion 0.05 MICROgram(s)/kG/Min IV Continuous <Continuous>  pantoprazole  Injectable 40 milliGRAM(s) IV Push two times a day  polyethylene glycol 3350 17 Gram(s) Oral daily  propofol Infusion 17.94 MICROgram(s)/kG/Min IV Continuous <Continuous>  senna 2 Tablet(s) Oral at bedtime  sodium chloride 0.9% lock flush 10 milliLiter(s) IV Push every 1 hour PRN  sodium chloride 0.9%. 1000 milliLiter(s) IV Continuous <Continuous>      Objective:  Vital Signs Last 24 Hrs  T(C): 36.8 (04 Dec 2022 08:00), Max: 37.3 (03 Dec 2022 12:00)  T(F): 98.2 (04 Dec 2022 08:00), Max: 99.1 (03 Dec 2022 12:00)  HR: 74 (04 Dec 2022 09:15) (65 - 75)  BP: --  BP(mean): --  RR: 14 (04 Dec 2022 09:15) (14 - 22)  SpO2: 100% (04 Dec 2022 09:15) (100% - 100%)    Parameters below as of 04 Dec 2022 08:00  Patient On (Oxygen Delivery Method): ventilator    O2 Concentration (%): 40       Eyes:INOCENCIO, EOMI  Ear/Nose/Throat: no oral lesion, no sinus tenderness on percussion	  Neck:no JVD, no lymphadenopathy, supple  Respiratory: CTA lore  Cardiovascular: S1S2 RRR, no murmurs  Gastrointestinal:soft, (+) BS, no HSM  Extremities:no e/e/c        LABS:                        9.2    15.07 )-----------( 122      ( 04 Dec 2022 06:19 )             27.8     12    148<H>  |  112<H>  |  93<H>  ----------------------------<  137<H>  4.4   |  21<L>  |  3.68<H>    Ca    7.9<L>      04 Dec 2022 00:32  Phos  5.6       Mg     2.4         TPro  5.8<L>  /  Alb  3.3  /  TBili  1.0  /  DBili  x   /  AST  59<H>  /  ALT  8<L>  /  AlkPhos  206<H>      PT/INR - ( 04 Dec 2022 00:32 )   PT: 17.1 sec;   INR: 1.47 ratio         PTT - ( 04 Dec 2022 06:18 )  PTT:47.2 sec  Urinalysis Basic - ( 03 Dec 2022 14:45 )    Color: BROWN / Appearance: Slightly Turbid / S.015 / pH: x  Gluc: x / Ketone: Negative  / Bili: Negative / Urobili: 6 mg/dL   Blood: x / Protein: 30 mg/dL / Nitrite: Negative   Leuk Esterase: Negative / RBC: 43 /hpf / WBC 2 /HPF   Sq Epi: x / Non Sq Epi: 1 /hpf / Bacteria: Negative          MICROBIOLOGY:    RECENT CULTURES:   @ 15:29 ET Tube ET Tube       Rare polymorphonuclear leukocytes per low power field  No Squamous epithelial cells per low power field  No organisms seen per oil power field              @ 17:46 .Bronchial Bronchial Lavage       Rare polymorphonuclear leukocytes per low power field  No Squamous epithelial cells per low power field  No organisms seen per oil power field           No growth     @ 13:20 .Blood Blood-Peripheral                No growth to date.     @ 13:10 .Bronchial Bronchial Lavage       Rare polymorphonuclear leukocytes per low power field  No squamous epithelial cells per low power field  No organisms seen           No growth     @ 15:24 .Blood Blood-Peripheral                No Growth Final          RESPIRATORY CULTURES:              RADIOLOGY & ADDITIONAL STUDIES:        Pager [phone]  After 5 pm/weekends or if no response :[phone] infectious diseases progress note:    Patient is a 62y old  Male who presents with a chief complaint of Acute cholecystitis, gallstone pancreatitis (04 Dec 2022 08:51)        Acute pancreatitis without infection or necrosis               Allergies    No Known Allergies    Intolerances        ANTIBIOTICS/RELEVANT:  antimicrobials  meropenem  IVPB 500 milliGRAM(s) IV Intermittent every 12 hours    immunologic:    OTHER:  argatroban Infusion 0.1 MICROgram(s)/kG/Min IV Continuous <Continuous>  artificial  tears Solution 1 Drop(s) Both EYES four times a day  atorvastatin 40 milliGRAM(s) Oral at bedtime  chlorhexidine 0.12% Liquid 15 milliLiter(s) Oral Mucosa every 12 hours  chlorhexidine 2% Cloths 1 Application(s) Topical <User Schedule>  dexMEDEtomidine Infusion 1.5 MICROgram(s)/kG/Hr IV Continuous <Continuous>  dextrose 50% Injectable 50 milliLiter(s) IV Push every 15 minutes  HYDROmorphone  Injectable 0.5 milliGRAM(s) IV Push every 4 hours PRN  insulin regular Infusion 6 Unit(s)/Hr IV Continuous <Continuous>  niCARdipine Infusion 2 mG/Hr IV Continuous <Continuous>  norepinephrine Infusion 0.05 MICROgram(s)/kG/Min IV Continuous <Continuous>  pantoprazole  Injectable 40 milliGRAM(s) IV Push two times a day  polyethylene glycol 3350 17 Gram(s) Oral daily  propofol Infusion 17.94 MICROgram(s)/kG/Min IV Continuous <Continuous>  senna 2 Tablet(s) Oral at bedtime  sodium chloride 0.9% lock flush 10 milliLiter(s) IV Push every 1 hour PRN  sodium chloride 0.9%. 1000 milliLiter(s) IV Continuous <Continuous>      Objective:  Vital Signs Last 24 Hrs  T(C): 36.8 (04 Dec 2022 08:00), Max: 37.3 (03 Dec 2022 12:00)  T(F): 98.2 (04 Dec 2022 08:00), Max: 99.1 (03 Dec 2022 12:00)  HR: 74 (04 Dec 2022 09:15) (65 - 75)  BP: --  BP(mean): --  RR: 14 (04 Dec 2022 09:15) (14 - 22)  SpO2: 100% (04 Dec 2022 09:15) (100% - 100%)    Parameters below as of 04 Dec 2022 08:00  Patient On (Oxygen Delivery Method): ventilator    O2 Concentration (%): 40       Eyes:INOCENCIO, EOMI  Ear/Nose/Throat: no oral lesion, no sinus tenderness on percussion	  Neck:no JVD, no lymphadenopathy, supple  Respiratory: CTA lore  Cardiovascular: S1S2 RRR, no murmurs  Gastrointestinal:soft, (+) BS, no HSM  Extremities:no e/e/c        LABS:                        9.2    15.07 )-----------( 122      ( 04 Dec 2022 06:19 )             27.8     12    148<H>  |  112<H>  |  93<H>  ----------------------------<  137<H>  4.4   |  21<L>  |  3.68<H>    Ca    7.9<L>      04 Dec 2022 00:32  Phos  5.6       Mg     2.4         TPro  5.8<L>  /  Alb  3.3  /  TBili  1.0  /  DBili  x   /  AST  59<H>  /  ALT  8<L>  /  AlkPhos  206<H>      PT/INR - ( 04 Dec 2022 00:32 )   PT: 17.1 sec;   INR: 1.47 ratio         PTT - ( 04 Dec 2022 06:18 )  PTT:47.2 sec  Urinalysis Basic - ( 03 Dec 2022 14:45 )    Color: BROWN / Appearance: Slightly Turbid / S.015 / pH: x  Gluc: x / Ketone: Negative  / Bili: Negative / Urobili: 6 mg/dL   Blood: x / Protein: 30 mg/dL / Nitrite: Negative   Leuk Esterase: Negative / RBC: 43 /hpf / WBC 2 /HPF   Sq Epi: x / Non Sq Epi: 1 /hpf / Bacteria: Negative          MICROBIOLOGY:    RECENT CULTURES:   @ 15:29 ET Tube ET Tube       Rare polymorphonuclear leukocytes per low power field  No Squamous epithelial cells per low power field  No organisms seen per oil power field              @ 17:46 .Bronchial Bronchial Lavage       Rare polymorphonuclear leukocytes per low power field  No Squamous epithelial cells per low power field  No organisms seen per oil power field           No growth     @ 13:20 .Blood Blood-Peripheral                No growth to date.     @ 13:10 .Bronchial Bronchial Lavage       Rare polymorphonuclear leukocytes per low power field  No squamous epithelial cells per low power field  No organisms seen           No growth     @ 15:24 .Blood Blood-Peripheral                No Growth Final          RESPIRATORY CULTURES:              RADIOLOGY & ADDITIONAL STUDIES:        Pager [phone]  After 5 pm/weekends or if no response :[phone] infectious diseases progress note:    Patient is a 62y old  Male who presents with a chief complaint of Acute cholecystitis, gallstone pancreatitis (04 Dec 2022 08:51)        Acute pancreatitis without infection or necrosis               Allergies    No Known Allergies    Intolerances        ANTIBIOTICS/RELEVANT:  antimicrobials  meropenem  IVPB 500 milliGRAM(s) IV Intermittent every 12 hours    immunologic:    OTHER:  argatroban Infusion 0.1 MICROgram(s)/kG/Min IV Continuous <Continuous>  artificial  tears Solution 1 Drop(s) Both EYES four times a day  atorvastatin 40 milliGRAM(s) Oral at bedtime  chlorhexidine 0.12% Liquid 15 milliLiter(s) Oral Mucosa every 12 hours  chlorhexidine 2% Cloths 1 Application(s) Topical <User Schedule>  dexMEDEtomidine Infusion 1.5 MICROgram(s)/kG/Hr IV Continuous <Continuous>  dextrose 50% Injectable 50 milliLiter(s) IV Push every 15 minutes  HYDROmorphone  Injectable 0.5 milliGRAM(s) IV Push every 4 hours PRN  insulin regular Infusion 6 Unit(s)/Hr IV Continuous <Continuous>  niCARdipine Infusion 2 mG/Hr IV Continuous <Continuous>  norepinephrine Infusion 0.05 MICROgram(s)/kG/Min IV Continuous <Continuous>  pantoprazole  Injectable 40 milliGRAM(s) IV Push two times a day  polyethylene glycol 3350 17 Gram(s) Oral daily  propofol Infusion 17.94 MICROgram(s)/kG/Min IV Continuous <Continuous>  senna 2 Tablet(s) Oral at bedtime  sodium chloride 0.9% lock flush 10 milliLiter(s) IV Push every 1 hour PRN  sodium chloride 0.9%. 1000 milliLiter(s) IV Continuous <Continuous>      Objective:  Vital Signs Last 24 Hrs  T(C): 36.8 (04 Dec 2022 08:00), Max: 37.3 (03 Dec 2022 12:00)  T(F): 98.2 (04 Dec 2022 08:00), Max: 99.1 (03 Dec 2022 12:00)  HR: 74 (04 Dec 2022 09:15) (65 - 75)  BP: --  BP(mean): --  RR: 14 (04 Dec 2022 09:15) (14 - 22)  SpO2: 100% (04 Dec 2022 09:15) (100% - 100%)    Parameters below as of 04 Dec 2022 08:00  Patient On (Oxygen Delivery Method): ventilator    O2 Concentration (%): 40       Eyes:INOCENCIO, EOMI  Ear/Nose/Throat: no oral lesion, no sinus tenderness on percussion	  Neck:no JVD, no lymphadenopathy, supple  Respiratory: CTA lore  Cardiovascular: S1S2 RRR, no murmurs  Gastrointestinal:soft, (+) BS, no HSM  Extremities:no e/e/c        LABS:                        9.2    15.07 )-----------( 122      ( 04 Dec 2022 06:19 )             27.8     12    148<H>  |  112<H>  |  93<H>  ----------------------------<  137<H>  4.4   |  21<L>  |  3.68<H>    Ca    7.9<L>      04 Dec 2022 00:32  Phos  5.6       Mg     2.4         TPro  5.8<L>  /  Alb  3.3  /  TBili  1.0  /  DBili  x   /  AST  59<H>  /  ALT  8<L>  /  AlkPhos  206<H>      PT/INR - ( 04 Dec 2022 00:32 )   PT: 17.1 sec;   INR: 1.47 ratio         PTT - ( 04 Dec 2022 06:18 )  PTT:47.2 sec  Urinalysis Basic - ( 03 Dec 2022 14:45 )    Color: BROWN / Appearance: Slightly Turbid / S.015 / pH: x  Gluc: x / Ketone: Negative  / Bili: Negative / Urobili: 6 mg/dL   Blood: x / Protein: 30 mg/dL / Nitrite: Negative   Leuk Esterase: Negative / RBC: 43 /hpf / WBC 2 /HPF   Sq Epi: x / Non Sq Epi: 1 /hpf / Bacteria: Negative          MICROBIOLOGY:    RECENT CULTURES:   @ 15:29 ET Tube ET Tube       Rare polymorphonuclear leukocytes per low power field  No Squamous epithelial cells per low power field  No organisms seen per oil power field              @ 17:46 .Bronchial Bronchial Lavage       Rare polymorphonuclear leukocytes per low power field  No Squamous epithelial cells per low power field  No organisms seen per oil power field           No growth     @ 13:20 .Blood Blood-Peripheral                No growth to date.     @ 13:10 .Bronchial Bronchial Lavage       Rare polymorphonuclear leukocytes per low power field  No squamous epithelial cells per low power field  No organisms seen           No growth     @ 15:24 .Blood Blood-Peripheral                No Growth Final          RESPIRATORY CULTURES:              RADIOLOGY & ADDITIONAL STUDIES:        Pager [phone]  After 5 pm/weekends or if no response :[phone]

## 2022-12-04 NOTE — PROGRESS NOTE ADULT - SUBJECTIVE AND OBJECTIVE BOX
Interval History: Overnight events noted. Creatinine remains elevated, cont to make urine     Medications:  argatroban Infusion 0.1 MICROgram(s)/kG/Min IV Continuous <Continuous>  artificial  tears Solution 1 Drop(s) Both EYES four times a day  atorvastatin 40 milliGRAM(s) Oral at bedtime  chlorhexidine 0.12% Liquid 15 milliLiter(s) Oral Mucosa every 12 hours  chlorhexidine 2% Cloths 1 Application(s) Topical <User Schedule>  dexMEDEtomidine Infusion 1.5 MICROgram(s)/kG/Hr IV Continuous <Continuous>  dextrose 50% Injectable 50 milliLiter(s) IV Push every 15 minutes  HYDROmorphone  Injectable 0.5 milliGRAM(s) IV Push every 4 hours PRN  insulin regular Infusion 6 Unit(s)/Hr IV Continuous <Continuous>  meropenem  IVPB 500 milliGRAM(s) IV Intermittent every 12 hours  niCARdipine Infusion 2 mG/Hr IV Continuous <Continuous>  norepinephrine Infusion 0.05 MICROgram(s)/kG/Min IV Continuous <Continuous>  pantoprazole  Injectable 40 milliGRAM(s) IV Push two times a day  polyethylene glycol 3350 17 Gram(s) Oral daily  propofol Infusion 17.94 MICROgram(s)/kG/Min IV Continuous <Continuous>  senna 2 Tablet(s) Oral at bedtime  sodium chloride 0.9% lock flush 10 milliLiter(s) IV Push every 1 hour PRN  sodium chloride 0.9%. 1000 milliLiter(s) IV Continuous <Continuous>      Vitals:  T(C): 36.8 (22 @ 08:00), Max: 37.3 (22 @ 12:00)  HR: 75 (22 @ 08:00) (65 - 75)  ABP: 152/60 (22 @ 08:00) (0/0 - 156/60)  ABP(mean): 80 (22 @ 08:00) (0 - 84)  RR: 15 (22 @ 08:00) (14 - 22)  SpO2: 100% (22 @ 08:00) (100% - 100%)    PA: -3/-7 (22 @ 16:45) (-3/-7 - 44/15)  PA(mean): -3 (22 @ 16:45) (-3 - 26)    Daily     Daily Weight in k.4 (04 Dec 2022 00:00)        I&O's Summary    03 Dec 2022 07:01  -  04 Dec 2022 07:00  --------------------------------------------------------  IN: 4807.1 mL / OUT: 2970 mL / NET: 1837.1 mL    04 Dec 2022 07:01  -  04 Dec 2022 08:51  --------------------------------------------------------  IN: 73.8 mL / OUT: 75 mL / NET: -1.2 mL        Physical Exam:  Appearance: sedated   HEENT: central lines   Cardiovascular: Normal S1 S2, ECMO+  Respiratory: Intubated   Gastrointestinal: Soft   Skin: No cyanosis	  Extremities: No LE edema       Labs:                        9.2    15.07 )-----------( 122      ( 04 Dec 2022 06:19 )             27.8     12    148<H>  |  112<H>  |  93<H>  ----------------------------<  137<H>  4.4   |  21<L>  |  3.68<H>    Ca    7.9<L>      04 Dec 2022 00:32  Phos  5.6       Mg     2.4         TPro  5.8<L>  /  Alb  3.3  /  TBili  1.0  /  DBili  x   /  AST  59<H>  /  ALT  8<L>  /  AlkPhos  206<H>      PT/INR - ( 04 Dec 2022 00:32 )   PT: 17.1 sec;   INR: 1.47 ratio         PTT - ( 04 Dec 2022 06:18 )  PTT:47.2 sec        Oxygen Saturation, Mixed: 75.7 ( @ 06:00)  Oxygen Saturation, Mixed: 75.9 ( @ 00:06)  Oxygen Saturation, Mixed: 69.0 ( @ 03:50)  Oxygen Saturation, Mixed: 67.4 ( @ 00:00)    Lactate Dehydrogenase, Serum: 423 U/L ( @ 00:32)  Lactate Dehydrogenase, Serum: 417 U/L ( @ 00:39)  Lactate Dehydrogenase, Serum: 462 U/L ( @ 00:20)

## 2022-12-04 NOTE — PROGRESS NOTE ADULT - SUBJECTIVE AND OBJECTIVE BOX
Patient seen and examined at the bedside.    Remained critically ill on continuous ICU monitoring.    OBJECTIVE:  Vital Signs Last 24 Hrs  T(C): 36.9 (04 Dec 2022 04:00), Max: 37.7 (03 Dec 2022 08:00)  T(F): 98.4 (04 Dec 2022 04:00), Max: 99.9 (03 Dec 2022 08:00)  HR: 70 (04 Dec 2022 07:00) (65 - 75)  BP: --  BP(mean): --  RR: 14 (04 Dec 2022 07:00) (14 - 22)  SpO2: 100% (04 Dec 2022 07:00) (100% - 100%)    Parameters below as of 04 Dec 2022 04:00  Patient On (Oxygen Delivery Method): ventilator    O2 Concentration (%): 40      Physical Exam:   General: Intubated, multiple lines gtt  Neurology: Sedated  Eyes: bilateral pupils equal and reactive   ENT/Neck: +ETT midline, Neck supple, trachea midline, No JVD   Respiratory: rhonchi bilaterally   CV: S1S2, no murmurs        [x] Sinus rhythm  Abdominal: Soft, NT, ND +BS   Extremities: 1+ pedal edema noted, + peripheral pulses   Skin: No Rashes, Hematoma, Ecchymosis                            ============================I/O===========================   I&O's Detail    03 Dec 2022 07:01  -  04 Dec 2022 07:00  --------------------------------------------------------  IN:    Argatroban: 12.6 mL    Dexmedetomidine: 835.2 mL    Free Water: 1800 mL    Insulin: 84 mL    IV PiggyBack: 500 mL    Norepinephrine: 22.8 mL    PRBCs (Packed Red Blood Cells): 600 mL    Propofol: 212.5 mL    Sodium Bicarbonate: 300 mL    sodium chloride 0.9%: 120 mL    Vital1.5: 320 mL  Total IN: 4807.1 mL    OUT:    Indwelling Catheter - Urethral (mL): 2970 mL    NiCARdipine: 0 mL  Total OUT: 2970 mL    Total NET: 1837.1 mL        ============================ LABS =========================                        9.3    15.31 )-----------( 114      ( 04 Dec 2022 00:32 )             28.4     12-    148<H>  |  112<H>  |  93<H>  ----------------------------<  137<H>  4.4   |  21<L>  |  3.68<H>    Ca    7.9<L>      04 Dec 2022 00:32  Phos  5.6       Mg     2.4         TPro  5.8<L>  /  Alb  3.3  /  TBili  1.0  /  DBili  x   /  AST  59<H>  /  ALT  8<L>  /  AlkPhos  206<H>  12    LIVER FUNCTIONS - ( 04 Dec 2022 00:32 )  Alb: 3.3 g/dL / Pro: 5.8 g/dL / ALK PHOS: 206 U/L / ALT: 8 U/L / AST: 59 U/L / GGT: x           PT/INR - ( 04 Dec 2022 00:32 )   PT: 17.1 sec;   INR: 1.47 ratio         PTT - ( 04 Dec 2022 06:18 )  PTT:47.2 sec  ABG - ( 04 Dec 2022 00:07 )  pH, Arterial: 7.39  pH, Blood: x     /  pCO2: 34    /  pO2: 132   / HCO3: 21    / Base Excess: -3.8  /  SaO2: 98.4        Urinalysis Basic - ( 03 Dec 2022 14:45 )    Color: BROWN / Appearance: Slightly Turbid / S.015 / pH: x  Gluc: x / Ketone: Negative  / Bili: Negative / Urobili: 6 mg/dL   Blood: x / Protein: 30 mg/dL / Nitrite: Negative   Leuk Esterase: Negative / RBC: 43 /hpf / WBC 2 /HPF   Sq Epi: x / Non Sq Epi: 1 /hpf / Bacteria: Negative    ======================Micro/Rad/Cardio=================  Culture: Reviewed   CXR: Reviewed  Echo: Reviewed  ======================================================  PAST MEDICAL & SURGICAL HISTORY:    ======================================================    Assessment:   63 y/o male with PMH of CABG, DM, HTN, HLD presenting as transfer from [place] for c/f STEMI. PTA arrival received 325 aspirin, 600 plavix, and no heparin drip started. Around 1:30 am patient had multiple episodes of non-bloody diarrhea and emesis with associated abdominal pain and chest pain, unable to localize, non-radiating, with associated SOB and no associated palpitations or diaphoresis. No recent fevers/chills, cough, rashes, or changes in urination. Does report mild diffuse back pain; started 5 days ago in setting on injury while walking and lifting objects. Denies focal weakness, numbness/tingling, or LOC due does report mild dizziness.    Cardiac arrest s/p [facility] ECMO   Hemodynamic instability  Hypovolemia  Post op respiratory insufficiency  Thrombocytopenia  Acute blood loss anemia  Leukocytosis     ======================================================  Plan:   ***Neuro***  CT head showed 4mm subdural hematoma : repeat CT head unchanged   [x] Sedated with [x] Precedex and propofol   Post operative neuro assessment   Dilaudid for pain    ***Cardiovascular***   TTE: EF 30-35%, Severe global LV dysfunction  Invasive hemodynamic monitoring, assess perfusion indices   SR 63/ CVP 10/ MAP 75/ Hct 28.4/ Lactate 0.8  [x] Levophed 0.01 mg  [x] ECMO- 3600 rpm, 4.08 flow  Continuos reassessment of hemodynamics   [x] Statin  [x] AC Therapy with Argatroban gtt (PTT 40-45)  ?cardiac cath when renal function stabilizes     ***Pulmonary***  Post op vent management   Titration of FiO2 and PEEP, follow SpO2, CXR, blood gasses   100% FiO2 on ECMO  Nitric oxide weaned off yesterday    Mode: AC/ CMV (Assist Control/ Continuous Mandatory Ventilation)  RR (machine): 14  FiO2: 40  PEEP: 12  ITime: 1  MAP: 16  PC: 10  PIP: 23              ***GI***  [x] NPO with TF's @goal of 20cc/hr  [x] Protonix    Bowel regimen with Miralax and senna     ***Renal***  ERIC, renal following  Continue to monitor I/Os, BUN/Creatinine.   Replete lytes PRN  De Dios present  Free Water 300 q4hr    ***ID***  Tmax 99F on CVVH -- presumed fever, with leukocytosis, central line changed yesterday  Meropenem for empiric coverage    ***Endocrine***  [x]  DM2: HbA1c 7.3%                - [x] Insulin gtt              - Need tight glycemic control to prevent wound infection.          Patient requires continuous monitoring with:  bedside rhythm monitoring, arterial line, pulse oximetry, ventilator management and monitoring; intermittent blood gas analysis.  Care plan discussed with ICU care team.  patient remain critical; required more than usual post op care; I have spent 35 minutes providing non routine post op care, revaluated multiple times during the day .     Care Plan discussed with the ICU care team. Patient remained critical, at risk for life threatening decompensation.     By signing my name below, I, Maria D'Amico, attest that this documentation has been prepared under the direction and in the presence of Yusef Millan MD.  Electronically signed: Maria D'Amico, Scribe, 22 @ 07:25    IMonty, personally performed the services described in this documentation. all medical record entries made by the scribe were at my direction and in my presence. I have reviewed the chart and agree that the record reflects my personal performance and is accurate and complete  Electronically signed: Yusef Millan MD. Patient seen and examined at the bedside.    Remained critically ill on continuous ICU monitoring.    OBJECTIVE:  Vital Signs Last 24 Hrs  T(C): 36.9 (04 Dec 2022 04:00), Max: 37.7 (03 Dec 2022 08:00)  T(F): 98.4 (04 Dec 2022 04:00), Max: 99.9 (03 Dec 2022 08:00)  HR: 70 (04 Dec 2022 07:00) (65 - 75)  BP: --  BP(mean): --  RR: 14 (04 Dec 2022 07:00) (14 - 22)  SpO2: 100% (04 Dec 2022 07:00) (100% - 100%)    Parameters below as of 04 Dec 2022 04:00  Patient On (Oxygen Delivery Method): ventilator    O2 Concentration (%): 40      Physical Exam:   General: Intubated, multiple lines gtt  Neurology: Sedated  Eyes: bilateral pupils equal and reactive   ENT/Neck: +ETT midline, Neck supple, trachea midline, No JVD   Respiratory: rhonchi bilaterally   CV: S1S2, no murmurs        [x] Sinus rhythm  Abdominal: Soft, NT, ND +BS   Extremities: 1+ pedal edema noted, + peripheral pulses   Skin: No Rashes, Hematoma, Ecchymosis                            ============================I/O===========================   I&O's Detail    03 Dec 2022 07:01  -  04 Dec 2022 07:00  --------------------------------------------------------  IN:    Argatroban: 12.6 mL    Dexmedetomidine: 835.2 mL    Free Water: 1800 mL    Insulin: 84 mL    IV PiggyBack: 500 mL    Norepinephrine: 22.8 mL    PRBCs (Packed Red Blood Cells): 600 mL    Propofol: 212.5 mL    Sodium Bicarbonate: 300 mL    sodium chloride 0.9%: 120 mL    Vital1.5: 320 mL  Total IN: 4807.1 mL    OUT:    Indwelling Catheter - Urethral (mL): 2970 mL    NiCARdipine: 0 mL  Total OUT: 2970 mL    Total NET: 1837.1 mL        ============================ LABS =========================                        9.3    15.31 )-----------( 114      ( 04 Dec 2022 00:32 )             28.4     12-    148<H>  |  112<H>  |  93<H>  ----------------------------<  137<H>  4.4   |  21<L>  |  3.68<H>    Ca    7.9<L>      04 Dec 2022 00:32  Phos  5.6       Mg     2.4         TPro  5.8<L>  /  Alb  3.3  /  TBili  1.0  /  DBili  x   /  AST  59<H>  /  ALT  8<L>  /  AlkPhos  206<H>  12    LIVER FUNCTIONS - ( 04 Dec 2022 00:32 )  Alb: 3.3 g/dL / Pro: 5.8 g/dL / ALK PHOS: 206 U/L / ALT: 8 U/L / AST: 59 U/L / GGT: x           PT/INR - ( 04 Dec 2022 00:32 )   PT: 17.1 sec;   INR: 1.47 ratio         PTT - ( 04 Dec 2022 06:18 )  PTT:47.2 sec  ABG - ( 04 Dec 2022 00:07 )  pH, Arterial: 7.39  pH, Blood: x     /  pCO2: 34    /  pO2: 132   / HCO3: 21    / Base Excess: -3.8  /  SaO2: 98.4        Urinalysis Basic - ( 03 Dec 2022 14:45 )    Color: BROWN / Appearance: Slightly Turbid / S.015 / pH: x  Gluc: x / Ketone: Negative  / Bili: Negative / Urobili: 6 mg/dL   Blood: x / Protein: 30 mg/dL / Nitrite: Negative   Leuk Esterase: Negative / RBC: 43 /hpf / WBC 2 /HPF   Sq Epi: x / Non Sq Epi: 1 /hpf / Bacteria: Negative    ======================Micro/Rad/Cardio=================  Culture: Reviewed   CXR: Reviewed  Echo: Reviewed  ======================================================  PAST MEDICAL & SURGICAL HISTORY:    ======================================================    Assessment:   63 y/o male with PMH of CABG, DM, HTN, HLD presenting as transfer from [place] for c/f STEMI. PTA arrival received 325 aspirin, 600 plavix, and no heparin drip started. Around 1:30 am patient had multiple episodes of non-bloody diarrhea and emesis with associated abdominal pain and chest pain, unable to localize, non-radiating, with associated SOB and no associated palpitations or diaphoresis. No recent fevers/chills, cough, rashes, or changes in urination. Does report mild diffuse back pain; started 5 days ago in setting on injury while walking and lifting objects. Denies focal weakness, numbness/tingling, or LOC due does report mild dizziness.    Cardiac arrest s/p [facility] ECMO   Hemodynamic instability  Hypovolemia  Post op respiratory insufficiency  Thrombocytopenia  Acute blood loss anemia  Leukocytosis     ======================================================  Plan:   ***Neuro***  CT head showed 4mm subdural hematoma : repeat CT head unchanged   [x] Sedated with [x] Precedex and propofol   Post operative neuro assessment   Dilaudid for pain    ***Cardiovascular***   TTE: EF 30-35%, Severe global LV dysfunction  Invasive hemodynamic monitoring, assess perfusion indices   SR 63/ CVP 10/ MAP 75/ Hct 28.4/ Lactate 0.8  [x] Levophed 0.01 mg  [x] ECMO- 3600 rpm, 4.08 flow  Continuos reassessment of hemodynamics   [x] Statin  [x] AC Therapy with Argatroban gtt (PTT 40-45)  ?cardiac cath when renal function stabilizes     ***Pulmonary***  Post op vent management   Titration of FiO2 and PEEP, follow SpO2, CXR, blood gasses   100% FiO2 on ECMO  Nitric oxide weaned off yesterday    Mode: AC/ CMV (Assist Control/ Continuous Mandatory Ventilation)  RR (machine): 14  FiO2: 40  PEEP: 12  ITime: 1  MAP: 16  PC: 10  PIP: 23              ***GI***  [x] NPO with TF's @goal of 20cc/hr  [x] Protonix    Bowel regimen with Miralax and senna     ***Renal***  ERIC, renal following  Continue to monitor I/Os, BUN/Creatinine.   Replete lytes PRN  De Dios present  Free Water 300 q4hr    ***ID***  Tmax 99F on CVVH -- presumed fever, with leukocytosis, central line changed yesterday  Meropenem for empiric coverage    ***Endocrine***  [x]  DM2: HbA1c 7.3%                - [x] Insulin gtt              - Need tight glycemic control to prevent wound infection.          Patient requires continuous monitoring with:  bedside rhythm monitoring, arterial line, pulse oximetry, ventilator management and monitoring; intermittent blood gas analysis.  Care plan discussed with ICU care team.  patient remain critical; required more than usual post op care; I have spent 35 minutes providing non routine post op care, revaluated multiple times during the day .     Care Plan discussed with the ICU care team. Patient remained critical, at risk for life threatening decompensation.     By signing my name below, I, Maria D'Amico, attest that this documentation has been prepared under the direction and in the presence of Yusef Millan MD.  Electronically signed: Maria D'Amico, Scribe, 22 @ 07:25    IMonty, personally performed the services described in this documentation. all medical record entries made by the scribe were at my direction and in my presence. I have reviewed the chart and agree that the record reflects my personal performance and is accurate and complete  Electronically signed: Yusef Millan MD. Patient seen and examined at the bedside.    Remained critically ill on continuous ICU monitoring.    OBJECTIVE:  Vital Signs Last 24 Hrs  T(C): 36.9 (04 Dec 2022 04:00), Max: 37.7 (03 Dec 2022 08:00)  T(F): 98.4 (04 Dec 2022 04:00), Max: 99.9 (03 Dec 2022 08:00)  HR: 70 (04 Dec 2022 07:00) (65 - 75)  BP: --  BP(mean): --  RR: 14 (04 Dec 2022 07:00) (14 - 22)  SpO2: 100% (04 Dec 2022 07:00) (100% - 100%)    Parameters below as of 04 Dec 2022 04:00  Patient On (Oxygen Delivery Method): ventilator    O2 Concentration (%): 40      Physical Exam:   General: Intubated, multiple lines gtt  Neurology: Sedated  Eyes: bilateral pupils equal and reactive   ENT/Neck: +ETT midline, Neck supple, trachea midline, No JVD   Respiratory: rhonchi bilaterally   CV: S1S2, no murmurs        [x] Sinus rhythm  Abdominal: Soft, NT, ND +BS   Extremities: 1+ pedal edema noted, + peripheral pulses   Skin: No Rashes, Hematoma, Ecchymosis                            ============================I/O===========================   I&O's Detail    03 Dec 2022 07:01  -  04 Dec 2022 07:00  --------------------------------------------------------  IN:    Argatroban: 12.6 mL    Dexmedetomidine: 835.2 mL    Free Water: 1800 mL    Insulin: 84 mL    IV PiggyBack: 500 mL    Norepinephrine: 22.8 mL    PRBCs (Packed Red Blood Cells): 600 mL    Propofol: 212.5 mL    Sodium Bicarbonate: 300 mL    sodium chloride 0.9%: 120 mL    Vital1.5: 320 mL  Total IN: 4807.1 mL    OUT:    Indwelling Catheter - Urethral (mL): 2970 mL    NiCARdipine: 0 mL  Total OUT: 2970 mL    Total NET: 1837.1 mL        ============================ LABS =========================                        9.3    15.31 )-----------( 114      ( 04 Dec 2022 00:32 )             28.4     12-    148<H>  |  112<H>  |  93<H>  ----------------------------<  137<H>  4.4   |  21<L>  |  3.68<H>    Ca    7.9<L>      04 Dec 2022 00:32  Phos  5.6       Mg     2.4         TPro  5.8<L>  /  Alb  3.3  /  TBili  1.0  /  DBili  x   /  AST  59<H>  /  ALT  8<L>  /  AlkPhos  206<H>  12    LIVER FUNCTIONS - ( 04 Dec 2022 00:32 )  Alb: 3.3 g/dL / Pro: 5.8 g/dL / ALK PHOS: 206 U/L / ALT: 8 U/L / AST: 59 U/L / GGT: x           PT/INR - ( 04 Dec 2022 00:32 )   PT: 17.1 sec;   INR: 1.47 ratio         PTT - ( 04 Dec 2022 06:18 )  PTT:47.2 sec  ABG - ( 04 Dec 2022 00:07 )  pH, Arterial: 7.39  pH, Blood: x     /  pCO2: 34    /  pO2: 132   / HCO3: 21    / Base Excess: -3.8  /  SaO2: 98.4        Urinalysis Basic - ( 03 Dec 2022 14:45 )    Color: BROWN / Appearance: Slightly Turbid / S.015 / pH: x  Gluc: x / Ketone: Negative  / Bili: Negative / Urobili: 6 mg/dL   Blood: x / Protein: 30 mg/dL / Nitrite: Negative   Leuk Esterase: Negative / RBC: 43 /hpf / WBC 2 /HPF   Sq Epi: x / Non Sq Epi: 1 /hpf / Bacteria: Negative    ======================Micro/Rad/Cardio=================  Culture: Reviewed   CXR: Reviewed  Echo: Reviewed  ======================================================  PAST MEDICAL & SURGICAL HISTORY:    ======================================================    Assessment:   61 y/o male with PMH of CABG, DM, HTN, HLD presenting as transfer from [place] for c/f STEMI. PTA arrival received 325 aspirin, 600 plavix, and no heparin drip started. Around 1:30 am patient had multiple episodes of non-bloody diarrhea and emesis with associated abdominal pain and chest pain, unable to localize, non-radiating, with associated SOB and no associated palpitations or diaphoresis. No recent fevers/chills, cough, rashes, or changes in urination. Does report mild diffuse back pain; started 5 days ago in setting on injury while walking and lifting objects. Denies focal weakness, numbness/tingling, or LOC due does report mild dizziness.    Cardiac arrest s/p [facility] ECMO   Hemodynamic instability  Hypovolemia  Post op respiratory insufficiency  Thrombocytopenia  Acute blood loss anemia  Leukocytosis     ======================================================  Plan:   ***Neuro***  CT head showed 4mm subdural hematoma : repeat CT head unchanged   [x] Sedated with [x] Precedex and propofol   Post operative neuro assessment   Dilaudid for pain    ***Cardiovascular***   TTE: EF 30-35%, Severe global LV dysfunction  Invasive hemodynamic monitoring, assess perfusion indices   SR 63/ CVP 10/ MAP 75/ Hct 28.4/ Lactate 0.8  [x] Levophed 0.01 mg  [x] ECMO- 3600 rpm, 4.08 flow  Continuos reassessment of hemodynamics   [x] Statin  [x] AC Therapy with Argatroban gtt (PTT 40-45)  ?cardiac cath when renal function stabilizes     ***Pulmonary***  Post op vent management   Titration of FiO2 and PEEP, follow SpO2, CXR, blood gasses   100% FiO2 on ECMO  Nitric oxide weaned off yesterday    Mode: AC/ CMV (Assist Control/ Continuous Mandatory Ventilation)  RR (machine): 14  FiO2: 40  PEEP: 12  ITime: 1  MAP: 16  PC: 10  PIP: 23              ***GI***  [x] NPO with TF's @goal of 20cc/hr  [x] Protonix    Bowel regimen with Miralax and senna     ***Renal***  ERIC, renal following  Continue to monitor I/Os, BUN/Creatinine.   Replete lytes PRN  De Dios present  Free Water 300 q4hr    ***ID***  Tmax 99F on CVVH -- presumed fever, with leukocytosis, central line changed yesterday  Meropenem for empiric coverage    ***Endocrine***  [x]  DM2: HbA1c 7.3%                - [x] Insulin gtt              - Need tight glycemic control to prevent wound infection.          Patient requires continuous monitoring with:  bedside rhythm monitoring, arterial line, pulse oximetry, ventilator management and monitoring; intermittent blood gas analysis.  Care plan discussed with ICU care team.  patient remain critical; required more than usual post op care; I have spent 35 minutes providing non routine post op care, revaluated multiple times during the day .     Care Plan discussed with the ICU care team. Patient remained critical, at risk for life threatening decompensation.     By signing my name below, I, Maria D'Amico, attest that this documentation has been prepared under the direction and in the presence of Yusef Millan MD.  Electronically signed: Maria D'Amico, Scribe, 22 @ 07:25    IMonty, personally performed the services described in this documentation. all medical record entries made by the scribe were at my direction and in my presence. I have reviewed the chart and agree that the record reflects my personal performance and is accurate and complete  Electronically signed: Yusef Millan MD. Patient seen and examined at the bedside.    Remained critically ill on continuous ICU monitoring.    OBJECTIVE:  Vital Signs Last 24 Hrs  T(C): 36.9 (04 Dec 2022 04:00), Max: 37.7 (03 Dec 2022 08:00)  T(F): 98.4 (04 Dec 2022 04:00), Max: 99.9 (03 Dec 2022 08:00)  HR: 70 (04 Dec 2022 07:00) (65 - 75)  BP: --  BP(mean): --  RR: 14 (04 Dec 2022 07:00) (14 - 22)  SpO2: 100% (04 Dec 2022 07:00) (100% - 100%)    Parameters below as of 04 Dec 2022 04:00  Patient On (Oxygen Delivery Method): ventilator    O2 Concentration (%): 40      Physical Exam:   General: Intubated, multiple lines gtt  Neurology: Sedated  Eyes: bilateral pupils equal and reactive   ENT/Neck: +ETT midline, Neck supple, trachea midline, No JVD   Respiratory: rhonchi bilaterally   CV: S1S2, no murmurs        [x] Sinus rhythm  Abdominal: Soft, NT, ND +BS   Extremities: 1+ pedal edema noted, + peripheral pulses   Skin: No Rashes, Hematoma, Ecchymosis                            ============================I/O===========================   I&O's Detail    03 Dec 2022 07:01  -  04 Dec 2022 07:00  --------------------------------------------------------  IN:    Argatroban: 12.6 mL    Dexmedetomidine: 835.2 mL    Free Water: 1800 mL    Insulin: 84 mL    IV PiggyBack: 500 mL    Norepinephrine: 22.8 mL    PRBCs (Packed Red Blood Cells): 600 mL    Propofol: 212.5 mL    Sodium Bicarbonate: 300 mL    sodium chloride 0.9%: 120 mL    Vital1.5: 320 mL  Total IN: 4807.1 mL    OUT:    Indwelling Catheter - Urethral (mL): 2970 mL    NiCARdipine: 0 mL  Total OUT: 2970 mL    Total NET: 1837.1 mL        ============================ LABS =========================                        9.3    15.31 )-----------( 114      ( 04 Dec 2022 00:32 )             28.4     12-    148<H>  |  112<H>  |  93<H>  ----------------------------<  137<H>  4.4   |  21<L>  |  3.68<H>    Ca    7.9<L>      04 Dec 2022 00:32  Phos  5.6       Mg     2.4         TPro  5.8<L>  /  Alb  3.3  /  TBili  1.0  /  DBili  x   /  AST  59<H>  /  ALT  8<L>  /  AlkPhos  206<H>  12    LIVER FUNCTIONS - ( 04 Dec 2022 00:32 )  Alb: 3.3 g/dL / Pro: 5.8 g/dL / ALK PHOS: 206 U/L / ALT: 8 U/L / AST: 59 U/L / GGT: x           PT/INR - ( 04 Dec 2022 00:32 )   PT: 17.1 sec;   INR: 1.47 ratio         PTT - ( 04 Dec 2022 06:18 )  PTT:47.2 sec  ABG - ( 04 Dec 2022 00:07 )  pH, Arterial: 7.39  pH, Blood: x     /  pCO2: 34    /  pO2: 132   / HCO3: 21    / Base Excess: -3.8  /  SaO2: 98.4        Urinalysis Basic - ( 03 Dec 2022 14:45 )    Color: BROWN / Appearance: Slightly Turbid / S.015 / pH: x  Gluc: x / Ketone: Negative  / Bili: Negative / Urobili: 6 mg/dL   Blood: x / Protein: 30 mg/dL / Nitrite: Negative   Leuk Esterase: Negative / RBC: 43 /hpf / WBC 2 /HPF   Sq Epi: x / Non Sq Epi: 1 /hpf / Bacteria: Negative    ======================Micro/Rad/Cardio=================  Culture: Reviewed   CXR: Reviewed  Echo: Reviewed  ======================================================  PAST MEDICAL & SURGICAL HISTORY:    ======================================================    Assessment:   63 y/o male with PMH of CABG, DM, HTN, HLD presenting as transfer from [place] for c/f STEMI. PTA arrival received 325 aspirin, 600 plavix, and no heparin drip started. Around 1:30 am patient had multiple episodes of non-bloody diarrhea and emesis with associated abdominal pain and chest pain, unable to localize, non-radiating, with associated SOB and no associated palpitations or diaphoresis. No recent fevers/chills, cough, rashes, or changes in urination. Does report mild diffuse back pain; started 5 days ago in setting on injury while walking and lifting objects. Denies focal weakness, numbness/tingling, or LOC due does report mild dizziness.    Cardiac arrest s/p [facility] ECMO   cardiogenic shock  Hemodynamic instability  Hypovolemia  Post op respiratory insufficiency  Thrombocytopenia  Acute blood loss anemia  Leukocytosis     ======================================================  Plan:   ***Neuro***  CT head showed 4mm subdural hematoma : repeat CT head unchanged   [x] Sedated with [x] Precedex and propofol   Post operative neuro assessment   Dilaudid for pain    ***Cardiovascular***   TTE: EF 30-35%, Severe global LV dysfunction  Invasive hemodynamic monitoring, assess perfusion indices   SR 63/ CVP 10/ MAP 75/ Hct 28.4/ Lactate 0.8  [x] Levophed 0.01 mg  [x] ECMO- 3600 rpm, 4.08 flow  Continuos reassessment of hemodynamics   [x] Statin  [x] AC Therapy with Argatroban gtt (PTT 40-45)  ?cardiac cath when renal function stabilizes     ***Pulmonary***  Post op vent management   Titration of FiO2 and PEEP, follow SpO2, CXR, blood gasses   100% FiO2 on ECMO  Nitric oxide weaned off yesterday    Mode: AC/ CMV (Assist Control/ Continuous Mandatory Ventilation)  RR (machine): 14  FiO2: 40  PEEP: 12  ITime: 1  MAP: 16  PC: 10  PIP: 23              ***GI***  [x] NPO with TF's @goal of 20cc/hr  [x] Protonix    Bowel regimen with Miralax and senna     ***Renal***  ERIC, renal following  Continue to monitor I/Os, BUN/Creatinine.   Replete lytes PRN  De Dios present  Free Water 300 q4hr    ***ID***  Tmax 99F on CVVH -- presumed fever, with leukocytosis, central line changed yesterday  Meropenem for empiric coverage    ***Endocrine***  [x]  DM2: HbA1c 7.3%                - [x] Insulin gtt              - Need tight glycemic control to prevent wound infection.          Patient requires continuous monitoring with:  bedside rhythm monitoring, arterial line, pulse oximetry, ventilator management and monitoring; intermittent blood gas analysis.  Care plan discussed with ICU care team.  patient remain critical; required more than usual post op care; I have spent 35 minutes providing non routine post op care, revaluated multiple times during the day .     Care Plan discussed with the ICU care team. Patient remained critical, at risk for life threatening decompensation.     By signing my name below, I, Maria D'Amico, attest that this documentation has been prepared under the direction and in the presence of Yusef Millan MD.  Electronically signed: Maria D'Amico, Scribe, 22 @ 07:25    IMonty, personally performed the services described in this documentation. all medical record entries made by the scribe were at my direction and in my presence. I have reviewed the chart and agree that the record reflects my personal performance and is accurate and complete  Electronically signed: uYsef Millan MD. Patient seen and examined at the bedside.    Remained critically ill on continuous ICU monitoring.    OBJECTIVE:  Vital Signs Last 24 Hrs  T(C): 36.9 (04 Dec 2022 04:00), Max: 37.7 (03 Dec 2022 08:00)  T(F): 98.4 (04 Dec 2022 04:00), Max: 99.9 (03 Dec 2022 08:00)  HR: 70 (04 Dec 2022 07:00) (65 - 75)  BP: --  BP(mean): --  RR: 14 (04 Dec 2022 07:00) (14 - 22)  SpO2: 100% (04 Dec 2022 07:00) (100% - 100%)    Parameters below as of 04 Dec 2022 04:00  Patient On (Oxygen Delivery Method): ventilator    O2 Concentration (%): 40      Physical Exam:   General: Intubated, multiple lines gtt  Neurology: Sedated  Eyes: bilateral pupils equal and reactive   ENT/Neck: +ETT midline, Neck supple, trachea midline, No JVD   Respiratory: rhonchi bilaterally   CV: S1S2, no murmurs        [x] Sinus rhythm  Abdominal: Soft, NT, ND +BS   Extremities: 1+ pedal edema noted, + peripheral pulses   Skin: No Rashes, Hematoma, Ecchymosis                            ============================I/O===========================   I&O's Detail    03 Dec 2022 07:01  -  04 Dec 2022 07:00  --------------------------------------------------------  IN:    Argatroban: 12.6 mL    Dexmedetomidine: 835.2 mL    Free Water: 1800 mL    Insulin: 84 mL    IV PiggyBack: 500 mL    Norepinephrine: 22.8 mL    PRBCs (Packed Red Blood Cells): 600 mL    Propofol: 212.5 mL    Sodium Bicarbonate: 300 mL    sodium chloride 0.9%: 120 mL    Vital1.5: 320 mL  Total IN: 4807.1 mL    OUT:    Indwelling Catheter - Urethral (mL): 2970 mL    NiCARdipine: 0 mL  Total OUT: 2970 mL    Total NET: 1837.1 mL        ============================ LABS =========================                        9.3    15.31 )-----------( 114      ( 04 Dec 2022 00:32 )             28.4     12-    148<H>  |  112<H>  |  93<H>  ----------------------------<  137<H>  4.4   |  21<L>  |  3.68<H>    Ca    7.9<L>      04 Dec 2022 00:32  Phos  5.6       Mg     2.4         TPro  5.8<L>  /  Alb  3.3  /  TBili  1.0  /  DBili  x   /  AST  59<H>  /  ALT  8<L>  /  AlkPhos  206<H>  12    LIVER FUNCTIONS - ( 04 Dec 2022 00:32 )  Alb: 3.3 g/dL / Pro: 5.8 g/dL / ALK PHOS: 206 U/L / ALT: 8 U/L / AST: 59 U/L / GGT: x           PT/INR - ( 04 Dec 2022 00:32 )   PT: 17.1 sec;   INR: 1.47 ratio         PTT - ( 04 Dec 2022 06:18 )  PTT:47.2 sec  ABG - ( 04 Dec 2022 00:07 )  pH, Arterial: 7.39  pH, Blood: x     /  pCO2: 34    /  pO2: 132   / HCO3: 21    / Base Excess: -3.8  /  SaO2: 98.4        Urinalysis Basic - ( 03 Dec 2022 14:45 )    Color: BROWN / Appearance: Slightly Turbid / S.015 / pH: x  Gluc: x / Ketone: Negative  / Bili: Negative / Urobili: 6 mg/dL   Blood: x / Protein: 30 mg/dL / Nitrite: Negative   Leuk Esterase: Negative / RBC: 43 /hpf / WBC 2 /HPF   Sq Epi: x / Non Sq Epi: 1 /hpf / Bacteria: Negative    ======================Micro/Rad/Cardio=================  Culture: Reviewed   CXR: Reviewed  Echo: Reviewed  ======================================================  PAST MEDICAL & SURGICAL HISTORY:    ======================================================    Assessment:   63 y/o male with PMH of CABG, DM, HTN, HLD presenting as transfer from [place] for c/f STEMI. PTA arrival received 325 aspirin, 600 plavix, and no heparin drip started. Around 1:30 am patient had multiple episodes of non-bloody diarrhea and emesis with associated abdominal pain and chest pain, unable to localize, non-radiating, with associated SOB and no associated palpitations or diaphoresis. No recent fevers/chills, cough, rashes, or changes in urination. Does report mild diffuse back pain; started 5 days ago in setting on injury while walking and lifting objects. Denies focal weakness, numbness/tingling, or LOC due does report mild dizziness.    Cardiac arrest s/p [facility] ECMO   cardiogenic shock  Hemodynamic instability  Hypovolemia  Post op respiratory insufficiency  Thrombocytopenia  Acute blood loss anemia  Leukocytosis     ======================================================  Plan:   ***Neuro***  CT head showed 4mm subdural hematoma : repeat CT head unchanged   [x] Sedated with [x] Precedex and propofol   Post operative neuro assessment   Dilaudid for pain    ***Cardiovascular***   TTE: EF 30-35%, Severe global LV dysfunction  Invasive hemodynamic monitoring, assess perfusion indices   SR 63/ CVP 10/ MAP 75/ Hct 28.4/ Lactate 0.8  [x] Levophed 0.01 mg  [x] ECMO- 3600 rpm, 4.08 flow  Continuos reassessment of hemodynamics   [x] Statin  [x] AC Therapy with Argatroban gtt (PTT 40-45)  ?cardiac cath when renal function stabilizes     ***Pulmonary***  Post op vent management   Titration of FiO2 and PEEP, follow SpO2, CXR, blood gasses   100% FiO2 on ECMO  Nitric oxide weaned off yesterday    Mode: AC/ CMV (Assist Control/ Continuous Mandatory Ventilation)  RR (machine): 14  FiO2: 40  PEEP: 12  ITime: 1  MAP: 16  PC: 10  PIP: 23              ***GI***  [x] NPO with TF's @goal of 20cc/hr  [x] Protonix    Bowel regimen with Miralax and senna     ***Renal***  ERIC, renal following  Continue to monitor I/Os, BUN/Creatinine.   Replete lytes PRN  De Dios present  Free Water 300 q4hr    ***ID***  Tmax 99F on CVVH -- presumed fever, with leukocytosis, central line changed yesterday  Meropenem for empiric coverage    ***Endocrine***  [x]  DM2: HbA1c 7.3%                - [x] Insulin gtt              - Need tight glycemic control to prevent wound infection.          Patient requires continuous monitoring with:  bedside rhythm monitoring, arterial line, pulse oximetry, ventilator management and monitoring; intermittent blood gas analysis.  Care plan discussed with ICU care team.  patient remain critical; required more than usual post op care; I have spent 35 minutes providing non routine post op care, revaluated multiple times during the day .     Care Plan discussed with the ICU care team. Patient remained critical, at risk for life threatening decompensation.     By signing my name below, I, Maria D'Amico, attest that this documentation has been prepared under the direction and in the presence of Yusef Millan MD.  Electronically signed: Maria D'Amico, Scribe, 22 @ 07:25    IMonty, personally performed the services described in this documentation. all medical record entries made by the scribe were at my direction and in my presence. I have reviewed the chart and agree that the record reflects my personal performance and is accurate and complete  Electronically signed: Yusef Millan MD. Patient seen and examined at the bedside.    Remained critically ill on continuous ICU monitoring.    OBJECTIVE:  Vital Signs Last 24 Hrs  T(C): 36.9 (04 Dec 2022 04:00), Max: 37.7 (03 Dec 2022 08:00)  T(F): 98.4 (04 Dec 2022 04:00), Max: 99.9 (03 Dec 2022 08:00)  HR: 70 (04 Dec 2022 07:00) (65 - 75)  BP: --  BP(mean): --  RR: 14 (04 Dec 2022 07:00) (14 - 22)  SpO2: 100% (04 Dec 2022 07:00) (100% - 100%)    Parameters below as of 04 Dec 2022 04:00  Patient On (Oxygen Delivery Method): ventilator    O2 Concentration (%): 40      Physical Exam:   General: Intubated, multiple lines gtt  Neurology: Sedated  Eyes: bilateral pupils equal and reactive   ENT/Neck: +ETT midline, Neck supple, trachea midline, No JVD   Respiratory: rhonchi bilaterally   CV: S1S2, no murmurs        [x] Sinus rhythm  Abdominal: Soft, NT, ND +BS   Extremities: 1+ pedal edema noted, + peripheral pulses   Skin: No Rashes, Hematoma, Ecchymosis                            ============================I/O===========================   I&O's Detail    03 Dec 2022 07:01  -  04 Dec 2022 07:00  --------------------------------------------------------  IN:    Argatroban: 12.6 mL    Dexmedetomidine: 835.2 mL    Free Water: 1800 mL    Insulin: 84 mL    IV PiggyBack: 500 mL    Norepinephrine: 22.8 mL    PRBCs (Packed Red Blood Cells): 600 mL    Propofol: 212.5 mL    Sodium Bicarbonate: 300 mL    sodium chloride 0.9%: 120 mL    Vital1.5: 320 mL  Total IN: 4807.1 mL    OUT:    Indwelling Catheter - Urethral (mL): 2970 mL    NiCARdipine: 0 mL  Total OUT: 2970 mL    Total NET: 1837.1 mL        ============================ LABS =========================                        9.3    15.31 )-----------( 114      ( 04 Dec 2022 00:32 )             28.4     12-    148<H>  |  112<H>  |  93<H>  ----------------------------<  137<H>  4.4   |  21<L>  |  3.68<H>    Ca    7.9<L>      04 Dec 2022 00:32  Phos  5.6       Mg     2.4         TPro  5.8<L>  /  Alb  3.3  /  TBili  1.0  /  DBili  x   /  AST  59<H>  /  ALT  8<L>  /  AlkPhos  206<H>  12    LIVER FUNCTIONS - ( 04 Dec 2022 00:32 )  Alb: 3.3 g/dL / Pro: 5.8 g/dL / ALK PHOS: 206 U/L / ALT: 8 U/L / AST: 59 U/L / GGT: x           PT/INR - ( 04 Dec 2022 00:32 )   PT: 17.1 sec;   INR: 1.47 ratio         PTT - ( 04 Dec 2022 06:18 )  PTT:47.2 sec  ABG - ( 04 Dec 2022 00:07 )  pH, Arterial: 7.39  pH, Blood: x     /  pCO2: 34    /  pO2: 132   / HCO3: 21    / Base Excess: -3.8  /  SaO2: 98.4        Urinalysis Basic - ( 03 Dec 2022 14:45 )    Color: BROWN / Appearance: Slightly Turbid / S.015 / pH: x  Gluc: x / Ketone: Negative  / Bili: Negative / Urobili: 6 mg/dL   Blood: x / Protein: 30 mg/dL / Nitrite: Negative   Leuk Esterase: Negative / RBC: 43 /hpf / WBC 2 /HPF   Sq Epi: x / Non Sq Epi: 1 /hpf / Bacteria: Negative    ======================Micro/Rad/Cardio=================  Culture: Reviewed   CXR: Reviewed  Echo: Reviewed  ======================================================  PAST MEDICAL & SURGICAL HISTORY:    ======================================================    Assessment:   63 y/o male with PMH of CABG, DM, HTN, HLD presenting as transfer from [place] for c/f STEMI. PTA arrival received 325 aspirin, 600 plavix, and no heparin drip started. Around 1:30 am patient had multiple episodes of non-bloody diarrhea and emesis with associated abdominal pain and chest pain, unable to localize, non-radiating, with associated SOB and no associated palpitations or diaphoresis. No recent fevers/chills, cough, rashes, or changes in urination. Does report mild diffuse back pain; started 5 days ago in setting on injury while walking and lifting objects. Denies focal weakness, numbness/tingling, or LOC due does report mild dizziness.    Cardiac arrest s/p [facility] ECMO   cardiogenic shock  Hemodynamic instability  Hypovolemia  Post op respiratory insufficiency  Thrombocytopenia  Acute blood loss anemia  Leukocytosis     ======================================================  Plan:   ***Neuro***  CT head showed 4mm subdural hematoma : repeat CT head unchanged   [x] Sedated with [x] Precedex and propofol   Post operative neuro assessment   Dilaudid for pain    ***Cardiovascular***   TTE: EF 30-35%, Severe global LV dysfunction  Invasive hemodynamic monitoring, assess perfusion indices   SR 63/ CVP 10/ MAP 75/ Hct 28.4/ Lactate 0.8  [x] Levophed 0.01 mg  [x] ECMO- 3600 rpm, 4.08 flow  Continuos reassessment of hemodynamics   [x] Statin  [x] AC Therapy with Argatroban gtt (PTT 40-45)  ?cardiac cath when renal function stabilizes     ***Pulmonary***  Post op vent management   Titration of FiO2 and PEEP, follow SpO2, CXR, blood gasses   100% FiO2 on ECMO  Nitric oxide weaned off yesterday    Mode: AC/ CMV (Assist Control/ Continuous Mandatory Ventilation)  RR (machine): 14  FiO2: 40  PEEP: 12  ITime: 1  MAP: 16  PC: 10  PIP: 23              ***GI***  [x] NPO with TF's @goal of 20cc/hr  [x] Protonix    Bowel regimen with Miralax and senna     ***Renal***  ERIC, renal following  Continue to monitor I/Os, BUN/Creatinine.   Replete lytes PRN  De Dios present  Free Water 300 q4hr    ***ID***  Tmax 99F on CVVH -- presumed fever, with leukocytosis, central line changed yesterday  Meropenem for empiric coverage    ***Endocrine***  [x]  DM2: HbA1c 7.3%                - [x] Insulin gtt              - Need tight glycemic control to prevent wound infection.          Patient requires continuous monitoring with:  bedside rhythm monitoring, arterial line, pulse oximetry, ventilator management and monitoring; intermittent blood gas analysis.  Care plan discussed with ICU care team.  patient remain critical; required more than usual post op care; I have spent 35 minutes providing non routine post op care, revaluated multiple times during the day .     Care Plan discussed with the ICU care team. Patient remained critical, at risk for life threatening decompensation.     By signing my name below, I, Maria D'Amico, attest that this documentation has been prepared under the direction and in the presence of Yusef Millan MD.  Electronically signed: Maria D'Amico, Scribe, 22 @ 07:25    IMonty, personally performed the services described in this documentation. all medical record entries made by the scribe were at my direction and in my presence. I have reviewed the chart and agree that the record reflects my personal performance and is accurate and complete  Electronically signed: Yusef Millan MD.

## 2022-12-04 NOTE — PROGRESS NOTE ADULT - ATTENDING COMMENTS
# Anthony/ATn - serum creatinine odalys from 3.2-3.61, but reached a plateau of 3.68. HOld diuretics for now. No immediate indication for dialysis, but we will monitor closely.     # Metabolic acidosis - continue bicarb drip.       # Medication toxicity monitoring: medication dose reviewed. Please dose medications to CrCl<15    The rest of the recommendations as per fellow's note.    Raquel Urena MD  Attending Nephrologist  [phone] or via [employer] . # Anthony/ATn - serum creatinine odalys from 3.2-3.61, but reached a plateau of 3.68. HOld diuretics for now. No immediate indication for dialysis, but we will monitor closely.     # Metabolic acidosis - continue bicarb drip.       # Medication toxicity monitoring: medication dose reviewed. Please dose medications to CrCl<15    The rest of the recommendations as per fellow's note.    Raquel Urena MD  Attending Nephrologist  [phone] or via [employer] . # Anthony/ATn - serum creatinine odalys from 3.2-3.61, but reached a plateau of 3.68. HOld diuretics for now. No immediate indication for dialysis, but we will monitor closely.     # Metabolic acidosis - continue bicarb drip.       # Medication toxicity monitoring: medication dose reviewed. Please dose medications to CrCl<15    The rest of the recommendations as per fellow's note.    Raquel Urena MD  Attending Nephrologist  [phone] or via [employer] .

## 2022-12-04 NOTE — PROGRESS NOTE ADULT - ASSESSMENT
62 year old admitted with pancreatitis  not necrotizing on the CT  scan and cholecystitis .  pt has sent to the SICU but developed respiratory  failure presumed to be due to ARDS also with hs of CAD<CABG<DM and possible MI  Movedto the CTU for echo      Fever  not on CAVHD  cultures are negative and ct looks like ARDS.  no fever    legs are viable    ARDS  likely all problems related to pancreatitis .   to dc meropenem today  reculture  for any signs of infection        CHF  on ecmo  weaning per cardiac team

## 2022-12-04 NOTE — PROVIDER CONTACT NOTE (CHANGE IN STATUS NOTIFICATION) - BACKGROUND
Patient admitted 11/24 with acute pancreatitis. Patient cardiac arrested 11/25- [facility] ECMO placed at the bedside.

## 2022-12-04 NOTE — PROGRESS NOTE ADULT - SUBJECTIVE AND OBJECTIVE BOX
[facility] Division of Kidney Diseases & Hypertension  FOLLOW UP NOTE  [phone]--------------------------------------------------------------------------------  Chief Complaint:Acute pancreatitis without infection or necrosis    HPI:  61 y/o male with PMH of CABG, DM, HTN, HLD presenting as transfer from [facility] for c/f STEMI. Course c/b gallstone pancreatitis leading to ARDS and shock & cardiac arrest now on [facility]. Had significant troponin elevation and his LVEF down to 8%. Pt was deemed to be too unstable to have an angiogram and potential PCI due to his co-morbidities & a new subdural bleed. Nephrology called for ERIC. SCr on arrival was 2.15; trended down to hector 1.6 on 11/25; slowly trended up & peaked to 3.95 11/28.     24 hour events/subjective:    Patient seen & examined today. Pt is intubated, unable to obtain ROS. Patient is no longer receiving diuretics. CVP is 7. Creatinine continues to worsen but UOP 2.9L        PAST HISTORY  --------------------------------------------------------------------------------  No significant changes to PMH, PSH, FHx, SHx, unless otherwise noted    ALLERGIES & MEDICATIONS  --------------------------------------------------------------------------------  Allergies    No Known Allergies    Intolerances      Standing Inpatient Medications  argatroban Infusion 0.1 MICROgram(s)/kG/Min IV Continuous <Continuous>  artificial  tears Solution 1 Drop(s) Both EYES four times a day  atorvastatin 40 milliGRAM(s) Oral at bedtime  chlorhexidine 0.12% Liquid 15 milliLiter(s) Oral Mucosa every 12 hours  chlorhexidine 2% Cloths 1 Application(s) Topical <User Schedule>  dexMEDEtomidine Infusion 1.5 MICROgram(s)/kG/Hr IV Continuous <Continuous>  dextrose 50% Injectable 50 milliLiter(s) IV Push every 15 minutes  insulin regular Infusion 6 Unit(s)/Hr IV Continuous <Continuous>  meropenem  IVPB 500 milliGRAM(s) IV Intermittent every 12 hours  niCARdipine Infusion 2 mG/Hr IV Continuous <Continuous>  norepinephrine Infusion 0.05 MICROgram(s)/kG/Min IV Continuous <Continuous>  pantoprazole  Injectable 40 milliGRAM(s) IV Push two times a day  polyethylene glycol 3350 17 Gram(s) Oral daily  propofol Infusion 17.94 MICROgram(s)/kG/Min IV Continuous <Continuous>  senna 2 Tablet(s) Oral at bedtime  sodium bicarbonate  Infusion 0.081 mEq/kG/Hr IV Continuous <Continuous>  sodium chloride 0.9%. 1000 milliLiter(s) IV Continuous <Continuous>    PRN Inpatient Medications  HYDROmorphone  Injectable 0.5 milliGRAM(s) IV Push every 4 hours PRN  sodium chloride 0.9% lock flush 10 milliLiter(s) IV Push every 1 hour PRN      REVIEW OF SYSTEMS  --------------------------------------------------------------------------------  Unable to be obtained      All other systems were reviewed and are negative, except as noted.    VITALS/PHYSICAL EXAM  --------------------------------------------------------------------------------  T(C): 36.8 (12-04-22 @ 08:00), Max: 37.3 (12-03-22 @ 12:00)  HR: 75 (12-04-22 @ 08:00) (65 - 75)  BP: --  RR: 15 (12-04-22 @ 08:00) (14 - 22)  SpO2: 100% (12-04-22 @ 08:00) (100% - 100%)  Wt(kg): --        12-03-22 @ 07:01  -  12-04-22 @ 07:00  --------------------------------------------------------  IN: 4807.1 mL / OUT: 2970 mL / NET: 1837.1 mL    12-04-22 @ 07:01  -  12-04-22 @ 08:31  --------------------------------------------------------  IN: 73.8 mL / OUT: 75 mL / NET: -1.2 mL      Physical Exam:  	Gen: elderly male intubated  	HEENT: Anicteric  	Pulm: Fair entry, ECMO circuit+  	CV: S1S2+  	Abd: Soft, +BS  	Ext:  trace LE edema B/L  	Neuro: sedated              : De Dios cath+ with clear urine  	Skin: Warm and dry    LABS/STUDIES  --------------------------------------------------------------------------------              9.2    15.07 >-----------<  122      [12-04-22 @ 06:19]              27.8     148  |  112  |  93  ----------------------------<  137      [12-04-22 @ 00:32]  4.4   |  21  |  3.68        Ca     7.9     [12-04-22 @ 00:32]      Mg     2.4     [12-04-22 @ 00:32]      Phos  5.6     [12-04-22 @ 00:32]    TPro  5.8  /  Alb  3.3  /  TBili  1.0  /  DBili  x   /  AST  59  /  ALT  8   /  AlkPhos  206  [12-04-22 @ 00:32]    PT/INR: PT 17.1 , INR 1.47       [12-04-22 @ 00:32]  PTT: 47.2       [12-04-22 @ 06:18]          [12-04-22 @ 00:32]    Creatinine Trend:  SCr 3.68 [12-04 @ 00:32]  SCr 3.61 [12-03 @ 00:39]  SCr 3.28 [12-02 @ 00:20]  SCr 3.01 [12-01 @ 01:08]  SCr 3.02 [11-30 @ 13:10]    Urinalysis - [12-03-22 @ 14:45]      Color BROWN / Appearance Slightly Turbid / SG 1.015 / pH 5.5      Gluc Negative / Ketone Negative  / Bili Negative / Urobili 6 mg/dL       Blood Moderate / Protein 30 mg/dL / Leuk Est Negative / Nitrite Negative      RBC 43 / WBC 2 / Hyaline 2 / Gran  / Sq Epi  / Non Sq Epi 1 / Bacteria Negative    Urine Osmolality 426      [12-03-22 @ 10:37]         [facility] Division of Kidney Diseases & Hypertension  FOLLOW UP NOTE  [phone]--------------------------------------------------------------------------------  Chief Complaint:Acute pancreatitis without infection or necrosis    HPI:  63 y/o male with PMH of CABG, DM, HTN, HLD presenting as transfer from [place] for c/f STEMI. Course c/b gallstone pancreatitis leading to ARDS and shock & cardiac arrest now on [facility]. Had significant troponin elevation and his LVEF down to 8%. Pt was deemed to be too unstable to have an angiogram and potential PCI due to his co-morbidities & a new subdural bleed. Nephrology called for ERIC. SCr on arrival was 2.15; trended down to hector 1.6 on 11/25; slowly trended up & peaked to 3.95 11/28.     24 hour events/subjective:    Patient seen & examined today. Pt is intubated, unable to obtain ROS. Patient is no longer receiving diuretics. CVP is 7. Creatinine continues to worsen but UOP 2.9L        PAST HISTORY  --------------------------------------------------------------------------------  No significant changes to PMH, PSH, FHx, SHx, unless otherwise noted    ALLERGIES & MEDICATIONS  --------------------------------------------------------------------------------  Allergies    No Known Allergies    Intolerances      Standing Inpatient Medications  argatroban Infusion 0.1 MICROgram(s)/kG/Min IV Continuous <Continuous>  artificial  tears Solution 1 Drop(s) Both EYES four times a day  atorvastatin 40 milliGRAM(s) Oral at bedtime  chlorhexidine 0.12% Liquid 15 milliLiter(s) Oral Mucosa every 12 hours  chlorhexidine 2% Cloths 1 Application(s) Topical <User Schedule>  dexMEDEtomidine Infusion 1.5 MICROgram(s)/kG/Hr IV Continuous <Continuous>  dextrose 50% Injectable 50 milliLiter(s) IV Push every 15 minutes  insulin regular Infusion 6 Unit(s)/Hr IV Continuous <Continuous>  meropenem  IVPB 500 milliGRAM(s) IV Intermittent every 12 hours  niCARdipine Infusion 2 mG/Hr IV Continuous <Continuous>  norepinephrine Infusion 0.05 MICROgram(s)/kG/Min IV Continuous <Continuous>  pantoprazole  Injectable 40 milliGRAM(s) IV Push two times a day  polyethylene glycol 3350 17 Gram(s) Oral daily  propofol Infusion 17.94 MICROgram(s)/kG/Min IV Continuous <Continuous>  senna 2 Tablet(s) Oral at bedtime  sodium bicarbonate  Infusion 0.081 mEq/kG/Hr IV Continuous <Continuous>  sodium chloride 0.9%. 1000 milliLiter(s) IV Continuous <Continuous>    PRN Inpatient Medications  HYDROmorphone  Injectable 0.5 milliGRAM(s) IV Push every 4 hours PRN  sodium chloride 0.9% lock flush 10 milliLiter(s) IV Push every 1 hour PRN      REVIEW OF SYSTEMS  --------------------------------------------------------------------------------  Unable to be obtained      All other systems were reviewed and are negative, except as noted.    VITALS/PHYSICAL EXAM  --------------------------------------------------------------------------------  T(C): 36.8 (12-04-22 @ 08:00), Max: 37.3 (12-03-22 @ 12:00)  HR: 75 (12-04-22 @ 08:00) (65 - 75)  BP: --  RR: 15 (12-04-22 @ 08:00) (14 - 22)  SpO2: 100% (12-04-22 @ 08:00) (100% - 100%)  Wt(kg): --        12-03-22 @ 07:01  -  12-04-22 @ 07:00  --------------------------------------------------------  IN: 4807.1 mL / OUT: 2970 mL / NET: 1837.1 mL    12-04-22 @ 07:01  -  12-04-22 @ 08:31  --------------------------------------------------------  IN: 73.8 mL / OUT: 75 mL / NET: -1.2 mL      Physical Exam:  	Gen: elderly male intubated  	HEENT: Anicteric  	Pulm: Fair entry, ECMO circuit+  	CV: S1S2+  	Abd: Soft, +BS  	Ext:  trace LE edema B/L  	Neuro: sedated              : De Dios cath+ with clear urine  	Skin: Warm and dry    LABS/STUDIES  --------------------------------------------------------------------------------              9.2    15.07 >-----------<  122      [12-04-22 @ 06:19]              27.8     148  |  112  |  93  ----------------------------<  137      [12-04-22 @ 00:32]  4.4   |  21  |  3.68        Ca     7.9     [12-04-22 @ 00:32]      Mg     2.4     [12-04-22 @ 00:32]      Phos  5.6     [12-04-22 @ 00:32]    TPro  5.8  /  Alb  3.3  /  TBili  1.0  /  DBili  x   /  AST  59  /  ALT  8   /  AlkPhos  206  [12-04-22 @ 00:32]    PT/INR: PT 17.1 , INR 1.47       [12-04-22 @ 00:32]  PTT: 47.2       [12-04-22 @ 06:18]          [12-04-22 @ 00:32]    Creatinine Trend:  SCr 3.68 [12-04 @ 00:32]  SCr 3.61 [12-03 @ 00:39]  SCr 3.28 [12-02 @ 00:20]  SCr 3.01 [12-01 @ 01:08]  SCr 3.02 [11-30 @ 13:10]    Urinalysis - [12-03-22 @ 14:45]      Color BROWN / Appearance Slightly Turbid / SG 1.015 / pH 5.5      Gluc Negative / Ketone Negative  / Bili Negative / Urobili 6 mg/dL       Blood Moderate / Protein 30 mg/dL / Leuk Est Negative / Nitrite Negative      RBC 43 / WBC 2 / Hyaline 2 / Gran  / Sq Epi  / Non Sq Epi 1 / Bacteria Negative    Urine Osmolality 426      [12-03-22 @ 10:37]         [facility] Division of Kidney Diseases & Hypertension  FOLLOW UP NOTE  [phone]--------------------------------------------------------------------------------  Chief Complaint:Acute pancreatitis without infection or necrosis    HPI:  61 y/o male with PMH of CABG, DM, HTN, HLD presenting as transfer from [place] for c/f STEMI. Course c/b gallstone pancreatitis leading to ARDS and shock & cardiac arrest now on [facility]. Had significant troponin elevation and his LVEF down to 8%. Pt was deemed to be too unstable to have an angiogram and potential PCI due to his co-morbidities & a new subdural bleed. Nephrology called for ERIC. SCr on arrival was 2.15; trended down to hector 1.6 on 11/25; slowly trended up & peaked to 3.95 11/28.     24 hour events/subjective:    Patient seen & examined today. Pt is intubated, unable to obtain ROS. Patient is no longer receiving diuretics. CVP is 7. Creatinine continues to worsen but UOP 2.9L        PAST HISTORY  --------------------------------------------------------------------------------  No significant changes to PMH, PSH, FHx, SHx, unless otherwise noted    ALLERGIES & MEDICATIONS  --------------------------------------------------------------------------------  Allergies    No Known Allergies    Intolerances      Standing Inpatient Medications  argatroban Infusion 0.1 MICROgram(s)/kG/Min IV Continuous <Continuous>  artificial  tears Solution 1 Drop(s) Both EYES four times a day  atorvastatin 40 milliGRAM(s) Oral at bedtime  chlorhexidine 0.12% Liquid 15 milliLiter(s) Oral Mucosa every 12 hours  chlorhexidine 2% Cloths 1 Application(s) Topical <User Schedule>  dexMEDEtomidine Infusion 1.5 MICROgram(s)/kG/Hr IV Continuous <Continuous>  dextrose 50% Injectable 50 milliLiter(s) IV Push every 15 minutes  insulin regular Infusion 6 Unit(s)/Hr IV Continuous <Continuous>  meropenem  IVPB 500 milliGRAM(s) IV Intermittent every 12 hours  niCARdipine Infusion 2 mG/Hr IV Continuous <Continuous>  norepinephrine Infusion 0.05 MICROgram(s)/kG/Min IV Continuous <Continuous>  pantoprazole  Injectable 40 milliGRAM(s) IV Push two times a day  polyethylene glycol 3350 17 Gram(s) Oral daily  propofol Infusion 17.94 MICROgram(s)/kG/Min IV Continuous <Continuous>  senna 2 Tablet(s) Oral at bedtime  sodium bicarbonate  Infusion 0.081 mEq/kG/Hr IV Continuous <Continuous>  sodium chloride 0.9%. 1000 milliLiter(s) IV Continuous <Continuous>    PRN Inpatient Medications  HYDROmorphone  Injectable 0.5 milliGRAM(s) IV Push every 4 hours PRN  sodium chloride 0.9% lock flush 10 milliLiter(s) IV Push every 1 hour PRN      REVIEW OF SYSTEMS  --------------------------------------------------------------------------------  Unable to be obtained      All other systems were reviewed and are negative, except as noted.    VITALS/PHYSICAL EXAM  --------------------------------------------------------------------------------  T(C): 36.8 (12-04-22 @ 08:00), Max: 37.3 (12-03-22 @ 12:00)  HR: 75 (12-04-22 @ 08:00) (65 - 75)  BP: --  RR: 15 (12-04-22 @ 08:00) (14 - 22)  SpO2: 100% (12-04-22 @ 08:00) (100% - 100%)  Wt(kg): --        12-03-22 @ 07:01  -  12-04-22 @ 07:00  --------------------------------------------------------  IN: 4807.1 mL / OUT: 2970 mL / NET: 1837.1 mL    12-04-22 @ 07:01  -  12-04-22 @ 08:31  --------------------------------------------------------  IN: 73.8 mL / OUT: 75 mL / NET: -1.2 mL      Physical Exam:  	Gen: elderly male intubated  	HEENT: Anicteric  	Pulm: Fair entry, ECMO circuit+  	CV: S1S2+  	Abd: Soft, +BS  	Ext:  trace LE edema B/L  	Neuro: sedated              : De Dios cath+ with clear urine  	Skin: Warm and dry    LABS/STUDIES  --------------------------------------------------------------------------------              9.2    15.07 >-----------<  122      [12-04-22 @ 06:19]              27.8     148  |  112  |  93  ----------------------------<  137      [12-04-22 @ 00:32]  4.4   |  21  |  3.68        Ca     7.9     [12-04-22 @ 00:32]      Mg     2.4     [12-04-22 @ 00:32]      Phos  5.6     [12-04-22 @ 00:32]    TPro  5.8  /  Alb  3.3  /  TBili  1.0  /  DBili  x   /  AST  59  /  ALT  8   /  AlkPhos  206  [12-04-22 @ 00:32]    PT/INR: PT 17.1 , INR 1.47       [12-04-22 @ 00:32]  PTT: 47.2       [12-04-22 @ 06:18]          [12-04-22 @ 00:32]    Creatinine Trend:  SCr 3.68 [12-04 @ 00:32]  SCr 3.61 [12-03 @ 00:39]  SCr 3.28 [12-02 @ 00:20]  SCr 3.01 [12-01 @ 01:08]  SCr 3.02 [11-30 @ 13:10]    Urinalysis - [12-03-22 @ 14:45]      Color BROWN / Appearance Slightly Turbid / SG 1.015 / pH 5.5      Gluc Negative / Ketone Negative  / Bili Negative / Urobili 6 mg/dL       Blood Moderate / Protein 30 mg/dL / Leuk Est Negative / Nitrite Negative      RBC 43 / WBC 2 / Hyaline 2 / Gran  / Sq Epi  / Non Sq Epi 1 / Bacteria Negative    Urine Osmolality 426      [12-03-22 @ 10:37]

## 2022-12-04 NOTE — PROGRESS NOTE ADULT - PROBLEM SELECTOR PLAN 2
In setting of diuretic use, improving since diuretics held. FW flushes were secondary to concern for volume overload. CXR reviewed from 12/4, appears to be improved since 12/1. Can give a dose of 10mg Metolazone. Urine osm 12/3 ~400 indicating that perhaps patient unable to concentrate his urine further from DI? Would expect the urine osm to be closer to the kidney concentrating maximal capacity in hypernatermia.   Monitor SNa.    If you have any questions, please feel free to contact me  Abhi Leone  Nephrology Fellow  [phone]; Prefer [employer] TEAMS  (After 5pm or on weekends please page the on-call fellow).    Patient was discussed with Dr. Urena  who agrees with my A/P. Addendum to follow In setting of diuretic use, improving since diuretics held. FW flushes were secondary to concern for volume overload. CXR reviewed from 12/4, appears to be improved since 12/1. Can give a dose of 10mg Metolazone. Urine osm 12/3 ~400 indicating that perhaps patient unable to concentrate his urine further from DI? Would expect the urine osm to be closer to the kidney concentrating maximal capacity in hypernatermia.   Monitor SNa.    If you have any questions, please feel free to contact me  Abhi Leone  Nephrology Fellow  [phone]; Prefer [employer] TEAMS  (After 5pm or on weekends please page the on-call fellow).    Patient was discussed with Dr. Urena  who agrees with my A/P. Addendum to follow In setting of diuretic use, improving since diuretics held. FW flushes were secondary to concern for volume overload. CXR reviewed from 12/4, appears to be improved since 12/1. Can give a dose of 10mg Metolazone. Urine osm 12/3 ~400 indicating that perhaps patient unable to concentrate his urine further from DI? Would expect the urine osm to be closer to the kidney concentrating maximal capacity in hypernatermia.   Monitor SNa.    If you have any questions, please feel free to contact me  Abhi Leone  Nephrology Fellow  [phone]; Prefer [employer] TEAMS  (After 5pm or on weekends please page the on-call fellow).    Patient was discussed with Dr. Urena  who agrees with my A/P. Addendum to follow In setting of diuretic use, improving since diuretics held. FW flushes were secondary to concern for volume overload. CXR reviewed from 12/4, appears to be improved since 12/1. Can give a dose of 10mg Metolazone. Urine osm 12/3 ~400 - likely not as concentrated as expected from ERIC. Would expect the urine osm to be closer to the kidney concentrating maximal capacity in hypernatremia.   Monitor SNa.    If you have any questions, please feel free to contact me  Abhi Leone  Nephrology Fellow  [phone]; Prefer [employer] TEAMS  (After 5pm or on weekends please page the on-call fellow).    Patient was discussed with Dr. Urena  who agrees with my A/P. Addendum to follow In setting of diuretic use, improving since diuretics held. FW flushes were secondary to concern for volume overload. CXR reviewed from 12/4, appears to be improved since 12/1. Can give a dose of 10mg Metolazone. Urine osm 12/3 ~400 - likely not as concentrated as expected from ERIC. Would expect the urine osm to be closer to the kidney concentrating maximal capacity in hypernatremia.   Monitor SNa.    If you have any questions, please feel free to contact me  Abhi Leone  Nephrology Fellow  [phone]; Prefer [employer] TEAMS  (After 5pm or on weekends please page the on-call fellow).    Patient was discussed with Dr. Urena  who agrees with my A/P. Addendum to follow In setting of diuretic use, improving since diuretics held. FW flushes were secondary to concern for volume overload. CXR reviewed from 12/4, appears to be improved since 12/1. Can give a dose of 10mg Metolazone. Urine osm 12/3 ~400 - likely not as concentrated as expected from ERIC. Would expect the urine osm to be closer to the kidney concentrating maximal capacity in hypernatremia.   Monitor SNa.    If you have any questions, please feel free to contact me  Abhi Leone  Nephrology Fellow  [phone]; Prefer [employer] TEAMS  (After 5pm or on weekends please page the on-call fellow).    Patient was discussed with Dr. Urena  who agrees with my A/P. Addendum to follow

## 2022-12-05 LAB
ALBUMIN SERPL ELPH-MCNC: 3 G/DL — LOW (ref 3.3–5)
ALP SERPL-CCNC: 224 U/L — HIGH (ref 40–120)
ALT FLD-CCNC: 9 U/L — LOW (ref 10–45)
AMYLASE P1 CFR SERPL: 386 U/L — HIGH (ref 25–125)
ANION GAP SERPL CALC-SCNC: 17 MMOL/L — SIGNIFICANT CHANGE UP (ref 5–17)
APTT BLD: 48.3 SEC — HIGH (ref 27.5–35.5)
APTT BLD: 48.4 SEC — HIGH (ref 27.5–35.5)
APTT BLD: 51.6 SEC — HIGH (ref 27.5–35.5)
APTT BLD: 55.4 SEC — HIGH (ref 27.5–35.5)
AST SERPL-CCNC: 55 U/L — HIGH (ref 10–40)
BASE EXCESS BLDV CALC-SCNC: -3.9 MMOL/L — LOW (ref -2–3)
BILIRUB SERPL-MCNC: 1 MG/DL — SIGNIFICANT CHANGE UP (ref 0.2–1.2)
BLD GP AB SCN SERPL QL: NEGATIVE — SIGNIFICANT CHANGE UP
BUN SERPL-MCNC: 101 MG/DL — HIGH (ref 7–23)
CALCIUM SERPL-MCNC: 8.4 MG/DL — SIGNIFICANT CHANGE UP (ref 8.4–10.5)
CHLORIDE SERPL-SCNC: 113 MMOL/L — HIGH (ref 96–108)
CO2 BLDV-SCNC: 22 MMOL/L — SIGNIFICANT CHANGE UP (ref 22–26)
CO2 SERPL-SCNC: 21 MMOL/L — LOW (ref 22–31)
CREAT SERPL-MCNC: 4.19 MG/DL — HIGH (ref 0.5–1.3)
CULTURE RESULTS: SIGNIFICANT CHANGE UP
EGFR: 15 ML/MIN/1.73M2 — LOW
FIBRINOGEN PPP-MCNC: 143 MG/DL — LOW (ref 200–445)
FIBRINOGEN PPP-MCNC: 156 MG/DL — LOW (ref 200–445)
GAS PNL BLDA: SIGNIFICANT CHANGE UP
GAS PNL BLDV: SIGNIFICANT CHANGE UP
GLUCOSE BLDC GLUCOMTR-MCNC: 112 MG/DL — HIGH (ref 70–99)
GLUCOSE BLDC GLUCOMTR-MCNC: 122 MG/DL — HIGH (ref 70–99)
GLUCOSE BLDC GLUCOMTR-MCNC: 124 MG/DL — HIGH (ref 70–99)
GLUCOSE BLDC GLUCOMTR-MCNC: 125 MG/DL — HIGH (ref 70–99)
GLUCOSE BLDC GLUCOMTR-MCNC: 128 MG/DL — HIGH (ref 70–99)
GLUCOSE BLDC GLUCOMTR-MCNC: 131 MG/DL — HIGH (ref 70–99)
GLUCOSE BLDC GLUCOMTR-MCNC: 133 MG/DL — HIGH (ref 70–99)
GLUCOSE BLDC GLUCOMTR-MCNC: 134 MG/DL — HIGH (ref 70–99)
GLUCOSE BLDC GLUCOMTR-MCNC: 137 MG/DL — HIGH (ref 70–99)
GLUCOSE BLDC GLUCOMTR-MCNC: 138 MG/DL — HIGH (ref 70–99)
GLUCOSE BLDC GLUCOMTR-MCNC: 140 MG/DL — HIGH (ref 70–99)
GLUCOSE BLDC GLUCOMTR-MCNC: 141 MG/DL — HIGH (ref 70–99)
GLUCOSE BLDC GLUCOMTR-MCNC: 142 MG/DL — HIGH (ref 70–99)
GLUCOSE BLDC GLUCOMTR-MCNC: 149 MG/DL — HIGH (ref 70–99)
GLUCOSE BLDC GLUCOMTR-MCNC: 151 MG/DL — HIGH (ref 70–99)
GLUCOSE SERPL-MCNC: 130 MG/DL — HIGH (ref 70–99)
HAPTOGLOB SERPL-MCNC: 202 MG/DL — HIGH (ref 34–200)
HCO3 BLDV-SCNC: 21 MMOL/L — LOW (ref 22–29)
HCT VFR BLD CALC: 25.2 % — LOW (ref 39–50)
HCT VFR BLD CALC: 25.7 % — LOW (ref 39–50)
HGB BLD-MCNC: 8.3 G/DL — LOW (ref 13–17)
HGB BLD-MCNC: 8.5 G/DL — LOW (ref 13–17)
HOROWITZ INDEX BLDV+IHG-RTO: 100 — SIGNIFICANT CHANGE UP
INR BLD: 1.51 RATIO — HIGH (ref 0.88–1.16)
INR BLD: 1.52 RATIO — HIGH (ref 0.88–1.16)
LDH SERPL L TO P-CCNC: 405 U/L — HIGH (ref 50–242)
LIDOCAIN IGE QN: 968 U/L — HIGH (ref 7–60)
MAGNESIUM SERPL-MCNC: 2.5 MG/DL — SIGNIFICANT CHANGE UP (ref 1.6–2.6)
MCHC RBC-ENTMCNC: 28.5 PG — SIGNIFICANT CHANGE UP (ref 27–34)
MCHC RBC-ENTMCNC: 29.4 PG — SIGNIFICANT CHANGE UP (ref 27–34)
MCHC RBC-ENTMCNC: 32.3 GM/DL — SIGNIFICANT CHANGE UP (ref 32–36)
MCHC RBC-ENTMCNC: 33.7 GM/DL — SIGNIFICANT CHANGE UP (ref 32–36)
MCV RBC AUTO: 87.2 FL — SIGNIFICANT CHANGE UP (ref 80–100)
MCV RBC AUTO: 88.3 FL — SIGNIFICANT CHANGE UP (ref 80–100)
NRBC # BLD: 0 /100 WBCS — SIGNIFICANT CHANGE UP (ref 0–0)
OB PNL STL: POSITIVE
PCO2 BLDV: 36 MMHG — LOW (ref 42–55)
PH BLDV: 7.37 — SIGNIFICANT CHANGE UP (ref 7.32–7.43)
PHOSPHATE SERPL-MCNC: 5.9 MG/DL — HIGH (ref 2.5–4.5)
PLATELET # BLD AUTO: 128 K/UL — LOW (ref 150–400)
PLATELET # BLD AUTO: 153 K/UL — SIGNIFICANT CHANGE UP (ref 150–400)
PO2 BLDV: 39 MMHG — SIGNIFICANT CHANGE UP (ref 25–45)
POTASSIUM SERPL-MCNC: 4.2 MMOL/L — SIGNIFICANT CHANGE UP (ref 3.5–5.3)
POTASSIUM SERPL-SCNC: 4.2 MMOL/L — SIGNIFICANT CHANGE UP (ref 3.5–5.3)
PROT SERPL-MCNC: 5.7 G/DL — LOW (ref 6–8.3)
PROTHROM AB SERPL-ACNC: 17.4 SEC — HIGH (ref 10.5–13.4)
PROTHROM AB SERPL-ACNC: 17.7 SEC — HIGH (ref 10.5–13.4)
RBC # BLD: 2.89 M/UL — LOW (ref 4.2–5.8)
RBC # BLD: 2.91 M/UL — LOW (ref 4.2–5.8)
RBC # FLD: 15.4 % — HIGH (ref 10.3–14.5)
RBC # FLD: 15.6 % — HIGH (ref 10.3–14.5)
RH IG SCN BLD-IMP: POSITIVE — SIGNIFICANT CHANGE UP
SAO2 % BLDV: 76.5 % — SIGNIFICANT CHANGE UP (ref 67–88)
SODIUM SERPL-SCNC: 151 MMOL/L — HIGH (ref 135–145)
SPECIMEN SOURCE: SIGNIFICANT CHANGE UP
TRIGL SERPL-MCNC: 161 MG/DL — HIGH
WBC # BLD: 12.82 K/UL — HIGH (ref 3.8–10.5)
WBC # BLD: 16.1 K/UL — HIGH (ref 3.8–10.5)
WBC # FLD AUTO: 12.82 K/UL — HIGH (ref 3.8–10.5)
WBC # FLD AUTO: 16.1 K/UL — HIGH (ref 3.8–10.5)

## 2022-12-05 PROCEDURE — 99233 SBSQ HOSP IP/OBS HIGH 50: CPT | Mod: GC

## 2022-12-05 PROCEDURE — 74018 RADEX ABDOMEN 1 VIEW: CPT | Mod: 26

## 2022-12-05 PROCEDURE — 71045 X-RAY EXAM CHEST 1 VIEW: CPT | Mod: 26

## 2022-12-05 PROCEDURE — 99291 CRITICAL CARE FIRST HOUR: CPT | Mod: GC

## 2022-12-05 PROCEDURE — 99232 SBSQ HOSP IP/OBS MODERATE 35: CPT

## 2022-12-05 PROCEDURE — 33949 ECMO/ECLS DAILY MGMT ARTERY: CPT

## 2022-12-05 PROCEDURE — 76700 US EXAM ABDOM COMPLETE: CPT | Mod: 26

## 2022-12-05 PROCEDURE — 99291 CRITICAL CARE FIRST HOUR: CPT

## 2022-12-05 PROCEDURE — 99292 CRITICAL CARE ADDL 30 MIN: CPT

## 2022-12-05 RX ORDER — HYDROMORPHONE HYDROCHLORIDE 2 MG/ML
0.5 INJECTION INTRAMUSCULAR; INTRAVENOUS; SUBCUTANEOUS EVERY 6 HOURS
Refills: 0 | Status: DISCONTINUED | OUTPATIENT
Start: 2022-12-05 | End: 2022-12-08

## 2022-12-05 RX ORDER — CALCIUM CHLORIDE
1000 POWDER (GRAM) MISCELLANEOUS ONCE
Refills: 0 | Status: COMPLETED | OUTPATIENT
Start: 2022-12-05 | End: 2022-12-05

## 2022-12-05 RX ADMIN — Medication 8.71 MICROGRAM(S)/KG/MIN: at 19:24

## 2022-12-05 RX ADMIN — CHLORHEXIDINE GLUCONATE 1 APPLICATION(S): 213 SOLUTION TOPICAL at 05:34

## 2022-12-05 RX ADMIN — SODIUM CHLORIDE 5 MILLILITER(S): 9 INJECTION INTRAMUSCULAR; INTRAVENOUS; SUBCUTANEOUS at 07:15

## 2022-12-05 RX ADMIN — ARGATROBAN 0.42 MICROGRAM(S)/KG/MIN: 50 INJECTION, SOLUTION INTRAVENOUS at 19:24

## 2022-12-05 RX ADMIN — DEXMEDETOMIDINE HYDROCHLORIDE IN 0.9% SODIUM CHLORIDE 34.8 MICROGRAM(S)/KG/HR: 4 INJECTION INTRAVENOUS at 07:16

## 2022-12-05 RX ADMIN — ARGATROBAN 0.42 MICROGRAM(S)/KG/MIN: 50 INJECTION, SOLUTION INTRAVENOUS at 15:22

## 2022-12-05 RX ADMIN — Medication 1 DROP(S): at 11:07

## 2022-12-05 RX ADMIN — INSULIN HUMAN 6 UNIT(S)/HR: 100 INJECTION, SOLUTION SUBCUTANEOUS at 07:15

## 2022-12-05 RX ADMIN — POLYETHYLENE GLYCOL 3350 17 GRAM(S): 17 POWDER, FOR SOLUTION ORAL at 17:25

## 2022-12-05 RX ADMIN — ARGATROBAN 0.56 MICROGRAM(S)/KG/MIN: 50 INJECTION, SOLUTION INTRAVENOUS at 07:15

## 2022-12-05 RX ADMIN — ATORVASTATIN CALCIUM 40 MILLIGRAM(S): 80 TABLET, FILM COATED ORAL at 21:03

## 2022-12-05 RX ADMIN — PANTOPRAZOLE SODIUM 40 MILLIGRAM(S): 20 TABLET, DELAYED RELEASE ORAL at 05:34

## 2022-12-05 RX ADMIN — CHLORHEXIDINE GLUCONATE 15 MILLILITER(S): 213 SOLUTION TOPICAL at 05:33

## 2022-12-05 RX ADMIN — CHLORHEXIDINE GLUCONATE 15 MILLILITER(S): 213 SOLUTION TOPICAL at 17:49

## 2022-12-05 RX ADMIN — DEXMEDETOMIDINE HYDROCHLORIDE IN 0.9% SODIUM CHLORIDE 34.8 MICROGRAM(S)/KG/HR: 4 INJECTION INTRAVENOUS at 19:25

## 2022-12-05 RX ADMIN — HYDROMORPHONE HYDROCHLORIDE 0.5 MILLIGRAM(S): 2 INJECTION INTRAMUSCULAR; INTRAVENOUS; SUBCUTANEOUS at 10:15

## 2022-12-05 RX ADMIN — Medication 1000 MILLIGRAM(S): at 01:44

## 2022-12-05 RX ADMIN — SENNA PLUS 2 TABLET(S): 8.6 TABLET ORAL at 21:03

## 2022-12-05 RX ADMIN — INSULIN HUMAN 6 UNIT(S)/HR: 100 INJECTION, SOLUTION SUBCUTANEOUS at 19:24

## 2022-12-05 RX ADMIN — Medication 8.71 MICROGRAM(S)/KG/MIN: at 07:16

## 2022-12-05 RX ADMIN — HYDROMORPHONE HYDROCHLORIDE 0.5 MILLIGRAM(S): 2 INJECTION INTRAMUSCULAR; INTRAVENOUS; SUBCUTANEOUS at 17:25

## 2022-12-05 RX ADMIN — PROPOFOL 10 MICROGRAM(S)/KG/MIN: 10 INJECTION, EMULSION INTRAVENOUS at 07:05

## 2022-12-05 RX ADMIN — PANTOPRAZOLE SODIUM 40 MILLIGRAM(S): 20 TABLET, DELAYED RELEASE ORAL at 17:25

## 2022-12-05 RX ADMIN — Medication 1 DROP(S): at 17:26

## 2022-12-05 RX ADMIN — Medication 1 DROP(S): at 05:34

## 2022-12-05 NOTE — PROGRESS NOTE ADULT - SUBJECTIVE AND OBJECTIVE BOX
infectious diseases progress note:    Patient is a 62y old  Male who presents with a chief complaint of Acute cholecystitis, gallstone pancreatitis (05 Dec 2022 07:50)        Acute pancreatitis without infection or necrosis             Allergies    No Known Allergies    Intolerances        ANTIBIOTICS/RELEVANT:  antimicrobials    immunologic:    OTHER:  argatroban Infusion 0.1 MICROgram(s)/kG/Min IV Continuous <Continuous>  artificial  tears Solution 1 Drop(s) Both EYES four times a day  atorvastatin 40 milliGRAM(s) Oral at bedtime  chlorhexidine 0.12% Liquid 15 milliLiter(s) Oral Mucosa every 12 hours  chlorhexidine 2% Cloths 1 Application(s) Topical <User Schedule>  dexMEDEtomidine Infusion 1.5 MICROgram(s)/kG/Hr IV Continuous <Continuous>  dextrose 50% Injectable 50 milliLiter(s) IV Push every 15 minutes  HYDROmorphone  Injectable 0.5 milliGRAM(s) IV Push every 4 hours PRN  insulin regular Infusion 6 Unit(s)/Hr IV Continuous <Continuous>  norepinephrine Infusion 0.05 MICROgram(s)/kG/Min IV Continuous <Continuous>  pantoprazole  Injectable 40 milliGRAM(s) IV Push two times a day  polyethylene glycol 3350 17 Gram(s) Oral daily  propofol Infusion 17.94 MICROgram(s)/kG/Min IV Continuous <Continuous>  senna 2 Tablet(s) Oral at bedtime  sodium chloride 0.9% lock flush 10 milliLiter(s) IV Push every 1 hour PRN  sodium chloride 0.9%. 1000 milliLiter(s) IV Continuous <Continuous>      Objective:  Vital Signs Last 24 Hrs  T(C): 36.8 (05 Dec 2022 08:00), Max: 37.3 (04 Dec 2022 16:00)  T(F): 98.2 (05 Dec 2022 08:00), Max: 99.1 (04 Dec 2022 16:00)  HR: 91 (05 Dec 2022 08:00) (72 - 94)  BP: --  BP(mean): --  RR: 17 (05 Dec 2022 08:00) (13 - 33)  SpO2: 100% (05 Dec 2022 08:00) (94% - 100%)    Parameters below as of 05 Dec 2022 04:00  Patient On (Oxygen Delivery Method): ventilator    O2 Concentration (%): 40       Eyes:INOCENCIO, EOMI  Ear/Nose/Throat: no oral lesion, no sinus tenderness on percussion	  Neck:no JVD, no lymphadenopathy, supple  Respiratory: CTA lore  Cardiovascular: S1S2 RRR, no murmurs  Gastrointestinal:soft, (+) BS, no HSM  Extremities:no e/e/c        LABS:                        8.5    12.82 )-----------( 128      ( 05 Dec 2022 00:56 )             25.2     12    151<H>  |  113<H>  |  101<H>  ----------------------------<  130<H>  4.2   |  21<L>  |  4.19<H>    Ca    8.4      05 Dec 2022 00:50  Phos  5.9       Mg     2.5         TPro  5.7<L>  /  Alb  3.0<L>  /  TBili  1.0  /  DBili  x   /  AST  55<H>  /  ALT  9<L>  /  AlkPhos  224<H>  12    PT/INR - ( 05 Dec 2022 00:56 )   PT: 17.4 sec;   INR: 1.51 ratio         PTT - ( 05 Dec 2022 00:56 )  PTT:48.4 sec  Urinalysis Basic - ( 03 Dec 2022 14:45 )    Color: BROWN / Appearance: Slightly Turbid / S.015 / pH: x  Gluc: x / Ketone: Negative  / Bili: Negative / Urobili: 6 mg/dL   Blood: x / Protein: 30 mg/dL / Nitrite: Negative   Leuk Esterase: Negative / RBC: 43 /hpf / WBC 2 /HPF   Sq Epi: x / Non Sq Epi: 1 /hpf / Bacteria: Negative          MICROBIOLOGY:    RECENT CULTURES:   @ 17:05 .Blood Blood                No growth to date.     @ 15:29 ET Tube ET Tube       Rare polymorphonuclear leukocytes per low power field  No Squamous epithelial cells per low power field  No organisms seen per oil power field           Normal Respiratory Christy present     @ 14:18 .Blood Blood                No growth to date.     @ 17:46 .Bronchial Bronchial Lavage       Rare polymorphonuclear leukocytes per low power field  No Squamous epithelial cells per low power field  No organisms seen per oil power field           No growth at 48 hours     @ 13:20 .Blood Blood-Peripheral                No growth to date.    11-30 @ 13:10 .Bronchial Bronchial Lavage       Rare polymorphonuclear leukocytes per low power field  No squamous epithelial cells per low power field  No organisms seen           No growth    11-28 @ 15:24 .Blood Blood-Peripheral                No Growth Final          RESPIRATORY CULTURES:              RADIOLOGY & ADDITIONAL STUDIES:        Pager [phone]  After 5 pm/weekends or if no response :[phone] infectious diseases progress note:    Patient is a 62y old  Male who presents with a chief complaint of Acute cholecystitis, gallstone pancreatitis (05 Dec 2022 07:50)        Acute pancreatitis without infection or necrosis             Allergies    No Known Allergies    Intolerances        ANTIBIOTICS/RELEVANT:  antimicrobials    immunologic:    OTHER:  argatroban Infusion 0.1 MICROgram(s)/kG/Min IV Continuous <Continuous>  artificial  tears Solution 1 Drop(s) Both EYES four times a day  atorvastatin 40 milliGRAM(s) Oral at bedtime  chlorhexidine 0.12% Liquid 15 milliLiter(s) Oral Mucosa every 12 hours  chlorhexidine 2% Cloths 1 Application(s) Topical <User Schedule>  dexMEDEtomidine Infusion 1.5 MICROgram(s)/kG/Hr IV Continuous <Continuous>  dextrose 50% Injectable 50 milliLiter(s) IV Push every 15 minutes  HYDROmorphone  Injectable 0.5 milliGRAM(s) IV Push every 4 hours PRN  insulin regular Infusion 6 Unit(s)/Hr IV Continuous <Continuous>  norepinephrine Infusion 0.05 MICROgram(s)/kG/Min IV Continuous <Continuous>  pantoprazole  Injectable 40 milliGRAM(s) IV Push two times a day  polyethylene glycol 3350 17 Gram(s) Oral daily  propofol Infusion 17.94 MICROgram(s)/kG/Min IV Continuous <Continuous>  senna 2 Tablet(s) Oral at bedtime  sodium chloride 0.9% lock flush 10 milliLiter(s) IV Push every 1 hour PRN  sodium chloride 0.9%. 1000 milliLiter(s) IV Continuous <Continuous>      Objective:  Vital Signs Last 24 Hrs  T(C): 36.8 (05 Dec 2022 08:00), Max: 37.3 (04 Dec 2022 16:00)  T(F): 98.2 (05 Dec 2022 08:00), Max: 99.1 (04 Dec 2022 16:00)  HR: 91 (05 Dec 2022 08:00) (72 - 94)  BP: --  BP(mean): --  RR: 17 (05 Dec 2022 08:00) (13 - 33)  SpO2: 100% (05 Dec 2022 08:00) (94% - 100%)    Parameters below as of 05 Dec 2022 04:00  Patient On (Oxygen Delivery Method): ventilator    O2 Concentration (%): 40       Eyes:INOCENCIO, EOMI  Ear/Nose/Throat: no oral lesion, no sinus tenderness on percussion	  Neck:no JVD, no lymphadenopathy, supple  Respiratory: CTA lore  Cardiovascular: S1S2 RRR, no murmurs  Gastrointestinal:soft, (+) BS, no HSM  Extremities:no e/e/c        LABS:                        8.5    12.82 )-----------( 128      ( 05 Dec 2022 00:56 )             25.2     12    151<H>  |  113<H>  |  101<H>  ----------------------------<  130<H>  4.2   |  21<L>  |  4.19<H>    Ca    8.4      05 Dec 2022 00:50  Phos  5.9       Mg     2.5         TPro  5.7<L>  /  Alb  3.0<L>  /  TBili  1.0  /  DBili  x   /  AST  55<H>  /  ALT  9<L>  /  AlkPhos  224<H>  12    PT/INR - ( 05 Dec 2022 00:56 )   PT: 17.4 sec;   INR: 1.51 ratio         PTT - ( 05 Dec 2022 00:56 )  PTT:48.4 sec  Urinalysis Basic - ( 03 Dec 2022 14:45 )    Color: BROWN / Appearance: Slightly Turbid / S.015 / pH: x  Gluc: x / Ketone: Negative  / Bili: Negative / Urobili: 6 mg/dL   Blood: x / Protein: 30 mg/dL / Nitrite: Negative   Leuk Esterase: Negative / RBC: 43 /hpf / WBC 2 /HPF   Sq Epi: x / Non Sq Epi: 1 /hpf / Bacteria: Negative          MICROBIOLOGY:    RECENT CULTURES:   @ 17:05 .Blood Blood                No growth to date.     @ 15:29 ET Tube ET Tube       Rare polymorphonuclear leukocytes per low power field  No Squamous epithelial cells per low power field  No organisms seen per oil power field           Normal Respiratory Christy present     @ 14:18 .Blood Blood                No growth to date.     @ 17:46 .Bronchial Bronchial Lavage       Rare polymorphonuclear leukocytes per low power field  No Squamous epithelial cells per low power field  No organisms seen per oil power field           No growth at 48 hours     @ 13:20 .Blood Blood-Peripheral                No growth to date.    11-30 @ 13:10 .Bronchial Bronchial Lavage       Rare polymorphonuclear leukocytes per low power field  No squamous epithelial cells per low power field  No organisms seen           No growth    11-28 @ 15:24 .Blood Blood-Peripheral                No Growth Final          RESPIRATORY CULTURES:              RADIOLOGY & ADDITIONAL STUDIES:        Pager [phone]  After 5 pm/weekends or if no response :[phone] infectious diseases progress note:    Patient is a 62y old  Male who presents with a chief complaint of Acute cholecystitis, gallstone pancreatitis (05 Dec 2022 07:50)        Acute pancreatitis without infection or necrosis             Allergies    No Known Allergies    Intolerances        ANTIBIOTICS/RELEVANT:  antimicrobials    immunologic:    OTHER:  argatroban Infusion 0.1 MICROgram(s)/kG/Min IV Continuous <Continuous>  artificial  tears Solution 1 Drop(s) Both EYES four times a day  atorvastatin 40 milliGRAM(s) Oral at bedtime  chlorhexidine 0.12% Liquid 15 milliLiter(s) Oral Mucosa every 12 hours  chlorhexidine 2% Cloths 1 Application(s) Topical <User Schedule>  dexMEDEtomidine Infusion 1.5 MICROgram(s)/kG/Hr IV Continuous <Continuous>  dextrose 50% Injectable 50 milliLiter(s) IV Push every 15 minutes  HYDROmorphone  Injectable 0.5 milliGRAM(s) IV Push every 4 hours PRN  insulin regular Infusion 6 Unit(s)/Hr IV Continuous <Continuous>  norepinephrine Infusion 0.05 MICROgram(s)/kG/Min IV Continuous <Continuous>  pantoprazole  Injectable 40 milliGRAM(s) IV Push two times a day  polyethylene glycol 3350 17 Gram(s) Oral daily  propofol Infusion 17.94 MICROgram(s)/kG/Min IV Continuous <Continuous>  senna 2 Tablet(s) Oral at bedtime  sodium chloride 0.9% lock flush 10 milliLiter(s) IV Push every 1 hour PRN  sodium chloride 0.9%. 1000 milliLiter(s) IV Continuous <Continuous>      Objective:  Vital Signs Last 24 Hrs  T(C): 36.8 (05 Dec 2022 08:00), Max: 37.3 (04 Dec 2022 16:00)  T(F): 98.2 (05 Dec 2022 08:00), Max: 99.1 (04 Dec 2022 16:00)  HR: 91 (05 Dec 2022 08:00) (72 - 94)  BP: --  BP(mean): --  RR: 17 (05 Dec 2022 08:00) (13 - 33)  SpO2: 100% (05 Dec 2022 08:00) (94% - 100%)    Parameters below as of 05 Dec 2022 04:00  Patient On (Oxygen Delivery Method): ventilator    O2 Concentration (%): 40       Eyes:INOCENCIO, EOMI  Ear/Nose/Throat: no oral lesion, no sinus tenderness on percussion	  Neck:no JVD, no lymphadenopathy, supple  Respiratory: CTA lore  Cardiovascular: S1S2 RRR, no murmurs  Gastrointestinal:soft, (+) BS, no HSM  Extremities:no e/e/c        LABS:                        8.5    12.82 )-----------( 128      ( 05 Dec 2022 00:56 )             25.2     12    151<H>  |  113<H>  |  101<H>  ----------------------------<  130<H>  4.2   |  21<L>  |  4.19<H>    Ca    8.4      05 Dec 2022 00:50  Phos  5.9       Mg     2.5         TPro  5.7<L>  /  Alb  3.0<L>  /  TBili  1.0  /  DBili  x   /  AST  55<H>  /  ALT  9<L>  /  AlkPhos  224<H>  12    PT/INR - ( 05 Dec 2022 00:56 )   PT: 17.4 sec;   INR: 1.51 ratio         PTT - ( 05 Dec 2022 00:56 )  PTT:48.4 sec  Urinalysis Basic - ( 03 Dec 2022 14:45 )    Color: BROWN / Appearance: Slightly Turbid / S.015 / pH: x  Gluc: x / Ketone: Negative  / Bili: Negative / Urobili: 6 mg/dL   Blood: x / Protein: 30 mg/dL / Nitrite: Negative   Leuk Esterase: Negative / RBC: 43 /hpf / WBC 2 /HPF   Sq Epi: x / Non Sq Epi: 1 /hpf / Bacteria: Negative          MICROBIOLOGY:    RECENT CULTURES:   @ 17:05 .Blood Blood                No growth to date.     @ 15:29 ET Tube ET Tube       Rare polymorphonuclear leukocytes per low power field  No Squamous epithelial cells per low power field  No organisms seen per oil power field           Normal Respiratory Christy present     @ 14:18 .Blood Blood                No growth to date.     @ 17:46 .Bronchial Bronchial Lavage       Rare polymorphonuclear leukocytes per low power field  No Squamous epithelial cells per low power field  No organisms seen per oil power field           No growth at 48 hours     @ 13:20 .Blood Blood-Peripheral                No growth to date.    11-30 @ 13:10 .Bronchial Bronchial Lavage       Rare polymorphonuclear leukocytes per low power field  No squamous epithelial cells per low power field  No organisms seen           No growth    11-28 @ 15:24 .Blood Blood-Peripheral                No Growth Final          RESPIRATORY CULTURES:              RADIOLOGY & ADDITIONAL STUDIES:        Pager [phone]  After 5 pm/weekends or if no response :[phone]

## 2022-12-05 NOTE — PROGRESS NOTE ADULT - ASSESSMENT
Na 150, , Cr 4.2 despite ongoing UOP   Alk phos and amylase/lipase still elevated    ----------------------------------------------------------------------------------------------------  62yM w/ h/[place] CABG [place]rs ago  [place]  [facility]  for:  eCPR, Refractory cardiogenic shock   Date of initiation: 11/25/2022       Cannulae:  19Fr R Fem Arterial (no distal perfusion),  25Fr L Fem Venous    Cannulation sites dressings intact, small amount of dried blood     Continue current support, discuss with IC regarding possible LHC and IABP placement prior to ECMO wean and possible decannulation   Plan to initiate CVVH given BUN/Cr   Anticoagulation w/argatroban, PTT 45-55  Monitor extremities, stable pulse exam currently, R thigh slightly larger than L   Rpt RUQ u/s  =============================================================  Weight (kg): 92.9 (11-24-22)        Height (cm): 172.7 (11-24-22)   BSA (m2): 2.06 (11-24-22)        BMI (kg/m2): 31.1 (11-24-22)    ECMO  RPM:  3600 (05 Dec 2022 10:00)      Pump Flow (Lpm):  4.25 (05 Dec 2022 10:00)              Sweep  (L/min):   1.5 (05 Dec 2022 10:00)       FiO2 (%):  1 (05 Dec 2022 10:00)  Pre-membrane -  Pressure (mm/Hg):   193 (05 Dec 2022 10:00)      Post-membrane - Pressure (mm/Hg):  167 (05 Dec 2022 10:00)           norepinephrine Infusion 0.05 MICROgram(s)/kG/Min IV Continuous <Continuous>    Vent Mode: AC/ CMV (Assist Control/ Continuous Mandatory Ventilation)  RR (machine): 14, FiO2: 40, PEEP: 12, MAP: 17, PC: 10, PIP: 23    (05 Dec 2022 08:25) pH, Art: 7.38/pCO2: 35/pO2: 141/HCO3: 21/BE: -4.0/SaO2: 98.4/LAC: 0.9, --   (05 Dec 2022 06:00) pH, Art: 7.32/pCO2: 43/pO2: 414/HCO3: 22/BE: -3.7/SaO2: 99.0/LAC: 1.0, POST ECMO   (05 Dec 2022 00:00) pH, Art: 7.43/pCO2: 34/pO2: 136/HCO3: 23/BE: -1.4/SaO2: 98.7/LAC: 0.8, --   (04 Dec 2022 22:25) pH, Art: 7.42/pCO2: 34/pO2: 124/HCO3: 22/BE: -2.0/SaO2: 98.3/LAC: 0.8, --   (04 Dec 2022 13:10) pH, Art: 7.39/pCO2: 35/pO2: 138/HCO3: 21/BE: -3.3/SaO2: 97.8/LAC: 0.8, --     (05 Dec 2022 00:00) pH, Kevin: 7.37/pCO2: 36/pO2: 39 /HCO3: 21/BE: -3.9/MVO2: /SvO2: 76.5/LAC: ,     ----------------------------------------------------------------------------------------------------  ANTICOAGULATION  argatroban Infusion 0.1 MICROgram(s)/kG/Min IV Continuous <Continuous>    (05 Dec 2022 00:56)  aPTT: 48.4  Xa: 0.00  PT: 17.4  INR: 1.51   Fibrinogen: 156   Platelets: 128   (04 Dec 2022 13:19)  aPTT: -----  Xa: -----  PT: -----  INR: -----   Fibrinogen: -----  Platelets: 125   (04 Dec 2022 06:19)  aPTT: -----  Xa: -----  PT: -----  INR: -----   Fibrinogen: -----  Platelets: 122   (04 Dec 2022 06:18)  aPTT: 47.2  Xa: 0.04  PT: -----  INR: -----   Fibrinogen: -----  Platelets: -----    (05 Dec 2022 00:57)  Hemoglobin: -----    LDH: -----    Haptoglobin: 202    PFH:  -----  (05 Dec 2022 00:56)  Hemoglobin: 8.5     LDH: -----    Haptoglobin: -----   PFH:  -----  (05 Dec 2022 00:50)  Hemoglobin: -----    LDH: 405     Haptoglobin: -----   PFH:  -----  (04 Dec 2022 13:19)  Hemoglobin: 9.1     LDH: -----    Haptoglobin: -----   PFH:  -----      ( 05 Dec 2022 00:50 )  Na 151  K 4.2  Cl 113  HCO3 21     Cr 4.19  ( 04 Dec 2022 16:41 )  Na 148  K 4.2  Cl 113  HCO3 21  BUN 96   Cr 3.79  ( 04 Dec 2022 13:19 )  Na 148  K 4.1  Cl 113  HCO3 20  BUN 96   Cr 3.81    (05 Dec 2022 00:50)  Ca    8.4   Phos   5.9    Mg  2.5      (04 Dec 2022 16:41)  Ca    7.4   Phos   --    Mg  --      (04 Dec 2022 13:19)  Ca    7.8   Phos   --    Mg  --        (05 Dec 2022 00:50)   TPro  5.7  /  Alb  3.0  /  TBili  1.0  /  DBili  --  /  AST  55  /  ALT  9   /  AlkPhos  224  (04 Dec 2022 13:19)   TPro  5.8  /  Alb  3.1  /  TBili  0.9  /  DBili  --  /  AST  56  /  ALT  8   /  AlkPhos  220  (04 Dec 2022 00:32)   TPro  5.8  /  Alb  3.3  /  TBili  1.0  /  DBili  --  /  AST  59  /  ALT  8   /  AlkPhos  206    ----------------------------------------------------------------------------------------------------  Daily ECMO Checklist:   Progress report received from perfusionist   Circuit checked/inspected   Emergency equipment and supplies checked   Cannulation site inspection     I have reviewed all of the above information as well as pertinent labs/imaging/testing and care plan with the bedside nurse, perfusionist and care team members and provided my recommendations for support.   ECMO Management was separate from critical care billing time.         Na 150, , Cr 4.2 despite ongoing UOP   Alk phos and amylase/lipase still elevated    ----------------------------------------------------------------------------------------------------  62yM w/ h/[place] CABG [place]rs ago  [place]  [facility]  for:  eCPR, Refractory cardiogenic shock   Date of initiation: 11/25/2022       Cannulae:  19Fr R Fem Arterial (no distal perfusion),  25Fr L Fem Venous    Cannulation sites dressings intact, small amount of dried blood     Continue current support, discuss with IC regarding possible LHC and IABP placement prior to ECMO wean and possible decannulation   Plan to initiate CVVH given BUN/Cr   Anticoagulation w/argatroban, PTT 40-50, has known ICH, stable on prior CT scan   Monitor extremities, stable pulse exam currently, R thigh slightly larger than L   Rpt RUQ u/s  =============================================================  Weight (kg): 92.9 (11-24-22)        Height (cm): 172.7 (11-24-22)   BSA (m2): 2.06 (11-24-22)        BMI (kg/m2): 31.1 (11-24-22)    ECMO  RPM:  3600 (05 Dec 2022 10:00)      Pump Flow (Lpm):  4.25 (05 Dec 2022 10:00)              Sweep  (L/min):   1.5 (05 Dec 2022 10:00)       FiO2 (%):  1 (05 Dec 2022 10:00)  Pre-membrane -  Pressure (mm/Hg):   193 (05 Dec 2022 10:00)      Post-membrane - Pressure (mm/Hg):  167 (05 Dec 2022 10:00)           norepinephrine Infusion 0.05 MICROgram(s)/kG/Min IV Continuous <Continuous>    Vent Mode: AC/ CMV (Assist Control/ Continuous Mandatory Ventilation)  RR (machine): 14, FiO2: 40, PEEP: 12, MAP: 17, PC: 10, PIP: 23    (05 Dec 2022 08:25) pH, Art: 7.38/pCO2: 35/pO2: 141/HCO3: 21/BE: -4.0/SaO2: 98.4/LAC: 0.9, --   (05 Dec 2022 06:00) pH, Art: 7.32/pCO2: 43/pO2: 414/HCO3: 22/BE: -3.7/SaO2: 99.0/LAC: 1.0, POST ECMO   (05 Dec 2022 00:00) pH, Art: 7.43/pCO2: 34/pO2: 136/HCO3: 23/BE: -1.4/SaO2: 98.7/LAC: 0.8, --   (04 Dec 2022 22:25) pH, Art: 7.42/pCO2: 34/pO2: 124/HCO3: 22/BE: -2.0/SaO2: 98.3/LAC: 0.8, --   (04 Dec 2022 13:10) pH, Art: 7.39/pCO2: 35/pO2: 138/HCO3: 21/BE: -3.3/SaO2: 97.8/LAC: 0.8, --     (05 Dec 2022 00:00) pH, Kevin: 7.37/pCO2: 36/pO2: 39 /HCO3: 21/BE: -3.9/MVO2: /SvO2: 76.5/LAC: ,     ----------------------------------------------------------------------------------------------------  ANTICOAGULATION  argatroban Infusion 0.1 MICROgram(s)/kG/Min IV Continuous <Continuous>    (05 Dec 2022 00:56)  aPTT: 48.4  Xa: 0.00  PT: 17.4  INR: 1.51   Fibrinogen: 156   Platelets: 128   (04 Dec 2022 13:19)  aPTT: -----  Xa: -----  PT: -----  INR: -----   Fibrinogen: -----  Platelets: 125   (04 Dec 2022 06:19)  aPTT: -----  Xa: -----  PT: -----  INR: -----   Fibrinogen: -----  Platelets: 122   (04 Dec 2022 06:18)  aPTT: 47.2  Xa: 0.04  PT: -----  INR: -----   Fibrinogen: -----  Platelets: -----    (05 Dec 2022 00:57)  Hemoglobin: -----    LDH: -----    Haptoglobin: 202    PFH:  -----  (05 Dec 2022 00:56)  Hemoglobin: 8.5     LDH: -----    Haptoglobin: -----   PFH:  -----  (05 Dec 2022 00:50)  Hemoglobin: -----    LDH: 405     Haptoglobin: -----   PFH:  -----  (04 Dec 2022 13:19)  Hemoglobin: 9.1     LDH: -----    Haptoglobin: -----   PFH:  -----      ( 05 Dec 2022 00:50 )  Na 151  K 4.2  Cl 113  HCO3 21     Cr 4.19  ( 04 Dec 2022 16:41 )  Na 148  K 4.2  Cl 113  HCO3 21  BUN 96   Cr 3.79  ( 04 Dec 2022 13:19 )  Na 148  K 4.1  Cl 113  HCO3 20  BUN 96   Cr 3.81    (05 Dec 2022 00:50)  Ca    8.4   Phos   5.9    Mg  2.5      (04 Dec 2022 16:41)  Ca    7.4   Phos   --    Mg  --      (04 Dec 2022 13:19)  Ca    7.8   Phos   --    Mg  --        (05 Dec 2022 00:50)   TPro  5.7  /  Alb  3.0  /  TBili  1.0  /  DBili  --  /  AST  55  /  ALT  9   /  AlkPhos  224  (04 Dec 2022 13:19)   TPro  5.8  /  Alb  3.1  /  TBili  0.9  /  DBili  --  /  AST  56  /  ALT  8   /  AlkPhos  220  (04 Dec 2022 00:32)   TPro  5.8  /  Alb  3.3  /  TBili  1.0  /  DBili  --  /  AST  59  /  ALT  8   /  AlkPhos  206    ----------------------------------------------------------------------------------------------------  Daily ECMO Checklist:   Progress report received from perfusionist   Circuit checked/inspected   Emergency equipment and supplies checked   Cannulation site inspection     I have reviewed all of the above information as well as pertinent labs/imaging/testing and care plan with the bedside nurse, perfusionist and care team members and provided my recommendations for support.   ECMO Management was separate from critical care billing time.         Na 150, , Cr 4.2 despite ongoing UOP   Alk phos and amylase/lipase still elevated    ----------------------------------------------------------------------------------------------------  62yM w/ h[place] CABG [place]rs ago  [place]  [facility]  for:  eCPR, Refractory cardiogenic shock   Date of initiation: 11/25/2022       Cannulae:  19Fr R Fem Arterial (no distal perfusion),  25Fr L Fem Venous    Cannulation sites dressings intact, small amount of dried blood     Continue current support, discuss with IC regarding possible LHC and IABP placement prior to ECMO wean and possible decannulation   Plan to initiate CVVH given BUN/Cr   Anticoagulation w/argatroban, PTT 40-50, has known Posterior falx SDH, last CT scan 11/26  Monitor extremities, stable pulse exam currently, R thigh slightly larger than L   Rpt RUQ u/s  =============================================================  Weight (kg): 92.9 (11-24-22)        Height (cm): 172.7 (11-24-22)   BSA (m2): 2.06 (11-24-22)        BMI (kg/m2): 31.1 (11-24-22)    ECMO  RPM:  3600 (05 Dec 2022 10:00)      Pump Flow (Lpm):  4.25 (05 Dec 2022 10:00)              Sweep  (L/min):   1.5 (05 Dec 2022 10:00)       FiO2 (%):  1 (05 Dec 2022 10:00)  Pre-membrane -  Pressure (mm/Hg):   193 (05 Dec 2022 10:00)      Post-membrane - Pressure (mm/Hg):  167 (05 Dec 2022 10:00)           norepinephrine Infusion 0.05 MICROgram(s)/kG/Min IV Continuous <Continuous>    Vent Mode: AC/ CMV (Assist Control/ Continuous Mandatory Ventilation)  RR (machine): 14, FiO2: 40, PEEP: 12, MAP: 17, PC: 10, PIP: 23    (05 Dec 2022 08:25) pH, Art: 7.38/pCO2: 35/pO2: 141/HCO3: 21/BE: -4.0/SaO2: 98.4/LAC: 0.9, --   (05 Dec 2022 06:00) pH, Art: 7.32/pCO2: 43/pO2: 414/HCO3: 22/BE: -3.7/SaO2: 99.0/LAC: 1.0, POST ECMO   (05 Dec 2022 00:00) pH, Art: 7.43/pCO2: 34/pO2: 136/HCO3: 23/BE: -1.4/SaO2: 98.7/LAC: 0.8, --   (04 Dec 2022 22:25) pH, Art: 7.42/pCO2: 34/pO2: 124/HCO3: 22/BE: -2.0/SaO2: 98.3/LAC: 0.8, --   (04 Dec 2022 13:10) pH, Art: 7.39/pCO2: 35/pO2: 138/HCO3: 21/BE: -3.3/SaO2: 97.8/LAC: 0.8, --     (05 Dec 2022 00:00) pH, Kevin: 7.37/pCO2: 36/pO2: 39 /HCO3: 21/BE: -3.9/MVO2: /SvO2: 76.5/LAC: ,     ----------------------------------------------------------------------------------------------------  ANTICOAGULATION  argatroban Infusion 0.1 MICROgram(s)/kG/Min IV Continuous <Continuous>    (05 Dec 2022 00:56)  aPTT: 48.4  Xa: 0.00  PT: 17.4  INR: 1.51   Fibrinogen: 156   Platelets: 128   (04 Dec 2022 13:19)  aPTT: -----  Xa: -----  PT: -----  INR: -----   Fibrinogen: -----  Platelets: 125   (04 Dec 2022 06:19)  aPTT: -----  Xa: -----  PT: -----  INR: -----   Fibrinogen: -----  Platelets: 122   (04 Dec 2022 06:18)  aPTT: 47.2  Xa: 0.04  PT: -----  INR: -----   Fibrinogen: -----  Platelets: -----    (05 Dec 2022 00:57)  Hemoglobin: -----    LDH: -----    Haptoglobin: 202    PFH:  -----  (05 Dec 2022 00:56)  Hemoglobin: 8.5     LDH: -----    Haptoglobin: -----   PFH:  -----  (05 Dec 2022 00:50)  Hemoglobin: -----    LDH: 405     Haptoglobin: -----   PFH:  -----  (04 Dec 2022 13:19)  Hemoglobin: 9.1     LDH: -----    Haptoglobin: -----   PFH:  -----      ( 05 Dec 2022 00:50 )  Na 151  K 4.2  Cl 113  HCO3 21     Cr 4.19  ( 04 Dec 2022 16:41 )  Na 148  K 4.2  Cl 113  HCO3 21  BUN 96   Cr 3.79  ( 04 Dec 2022 13:19 )  Na 148  K 4.1  Cl 113  HCO3 20  BUN 96   Cr 3.81    (05 Dec 2022 00:50)  Ca    8.4   Phos   5.9    Mg  2.5      (04 Dec 2022 16:41)  Ca    7.4   Phos   --    Mg  --      (04 Dec 2022 13:19)  Ca    7.8   Phos   --    Mg  --        (05 Dec 2022 00:50)   TPro  5.7  /  Alb  3.0  /  TBili  1.0  /  DBili  --  /  AST  55  /  ALT  9   /  AlkPhos  224  (04 Dec 2022 13:19)   TPro  5.8  /  Alb  3.1  /  TBili  0.9  /  DBili  --  /  AST  56  /  ALT  8   /  AlkPhos  220  (04 Dec 2022 00:32)   TPro  5.8  /  Alb  3.3  /  TBili  1.0  /  DBili  --  /  AST  59  /  ALT  8   /  AlkPhos  206    ----------------------------------------------------------------------------------------------------  Daily ECMO Checklist:   Progress report received from perfusionist   Circuit checked/inspected   Emergency equipment and supplies checked   Cannulation site inspection     I have reviewed all of the above information as well as pertinent labs/imaging/testing and care plan with the bedside nurse, perfusionist and care team members and provided my recommendations for support.   ECMO Management was separate from critical care billing time.

## 2022-12-05 NOTE — PROGRESS NOTE ADULT - PROBLEM SELECTOR PLAN 3
- likely arrest associated ATN and now worsened from hypovolemia   - stop diuretics and aim to keep net even to positive   - renal following, given hyperNa and volume status plan for CVVH today

## 2022-12-05 NOTE — PROGRESS NOTE ADULT - PROBLEM SELECTOR PLAN 2
In setting of diuretic use. SNa elevated at 151 today. Plan for CRRT/CVVHDF as outlined above. Monitor SNa.

## 2022-12-05 NOTE — PROGRESS NOTE ADULT - PROBLEM SELECTOR PLAN 1
- troponin peaked at > 00480   - plan for LHC with IABP once recovers from ERIC, likely tomorrow as above  - continue statin  - would start ASA when okay with surgical team. - troponin peaked at > 29990   - plan for LHC with IABP once recovers from ERIC, likely tomorrow as above  - continue statin  - would start ASA when okay with surgical team. - troponin peaked at > 87597   - plan for LHC with IABP once recovers from ERIC, likely tomorrow as above  - continue statin  - would start ASA when okay with surgical team. - troponin peaked at > 21596   - plan for LHC with IABP;, likely tomorrow as above  - continue statin  - would start ASA when okay with surgical team. - troponin peaked at > 35410   - plan for LHC with IABP;, likely tomorrow as above  - continue statin  - would start ASA when okay with surgical team. - troponin peaked at > 10043   - plan for LHC with IABP;, likely tomorrow as above  - continue statin  - would start ASA when okay with surgical team.

## 2022-12-05 NOTE — PROGRESS NOTE ADULT - ATTENDING COMMENTS
ERIC ATN cardiogenic shock ECMO  Worsening ERIC and volume overload  Plan on CRRT for now for volume management and clearance on ECMO circuit    Melissa Mercer MD  Off: [phone]  contact me on teams    (After 5 pm or on weekends please page the on-call fellow/attending, can check [email or web address] for schedule. Login is carmen cantor, schedule under [facility] medicine, psych, derm) ERIC ATN cardiogenic shock ECMO  Worsening ERIC and volume overload  Plan on CRRT for now for volume management and clearance on ECMO circuit    Melissa Mercer MD  Off: [phone]  contact me on teams    (After 5 pm or on weekends please page the on-call fellow/attending, can check [email or web address] for schedule. Login is carmen cantor, schedule under [facility] medicine, psych, derm) ERIC ATN cardiogenic shock ECMO  Worsening ERIC and volume overload  Plan on CRRT for now for volume management and clearance on ECMO circuit    Melissa Mercer MD  Off: [phone]  contact me on teams    (After 5 pm or on weekends please page the on-call fellow/attending, can check [email or web address] for schedule. Login is carmen cantor, schedule under [facility] medicine, psych, derm)

## 2022-12-05 NOTE — PROGRESS NOTE ADULT - SUBJECTIVE AND OBJECTIVE BOX
[facility] DIVISION OF KIDNEY DISEASES AND HYPERTENSION   FOLLOW UP NOTE    --------------------------------------------------------------------------------  HPI:  63 y/o male with PMH of CABG, DM, HTN, HLD presenting as transfer from [place] for c/f STEMI. Course c/b gallstone pancreatitis leading to ARDS and shock & cardiac arrest now on [facility] ECMO. Had significant troponin elevation and his LVEF down to 8%. Pt was deemed to be too unstable to have an angiogram and potential PCI due to his co-morbidities & a new subdural bleed. Pt being seen for ERIC. SCr on arrival was 2.15; trended down to hector 1.6 on 11/25 and eventually increased to 3.95 11/28. Pt received IV diuretics as per CTU.    Patient seen & examined today. Pt is intubated, unable to obtain ROS. Pt remains off diuretics. Scr increased to 4.19. Pt is nonoliguric.       PAST HISTORY  --------------------------------------------------------------------------------  No significant changes to PMH, PSH, FHx, SHx, unless otherwise noted    ALLERGIES & MEDICATIONS  --------------------------------------------------------------------------------  Allergies    No Known Allergies    Intolerances    Standing Inpatient Medications  argatroban Infusion 0.1 MICROgram(s)/kG/Min IV Continuous <Continuous>  artificial  tears Solution 1 Drop(s) Both EYES four times a day  atorvastatin 40 milliGRAM(s) Oral at bedtime  bisacodyl Suppository 10 milliGRAM(s) Rectal once  chlorhexidine 0.12% Liquid 15 milliLiter(s) Oral Mucosa every 12 hours  chlorhexidine 2% Cloths 1 Application(s) Topical <User Schedule>  CRRT Treatment    <Continuous>  dexMEDEtomidine Infusion 1.5 MICROgram(s)/kG/Hr IV Continuous <Continuous>  dextrose 50% Injectable 50 milliLiter(s) IV Push every 15 minutes  HYDROmorphone  Injectable 0.5 milliGRAM(s) IV Push every 6 hours  insulin regular Infusion 6 Unit(s)/Hr IV Continuous <Continuous>  norepinephrine Infusion 0.05 MICROgram(s)/kG/Min IV Continuous <Continuous>  pantoprazole  Injectable 40 milliGRAM(s) IV Push two times a day  polyethylene glycol 3350 17 Gram(s) Oral two times a day  PrismaSATE Dialysate BK 0 / 3.5 5000 milliLiter(s) CRRT <Continuous>  PrismaSOL Filtration BGK 4 / 2.5 5000 milliLiter(s) CRRT <Continuous>  PrismaSOL Filtration BGK 4 / 2.5 5000 milliLiter(s) CRRT <Continuous>  propofol Infusion 17.94 MICROgram(s)/kG/Min IV Continuous <Continuous>  senna 2 Tablet(s) Oral at bedtime  sodium chloride 0.9%. 1000 milliLiter(s) IV Continuous <Continuous>    PRN Inpatient Medications  sodium chloride 0.9% lock flush 10 milliLiter(s) IV Push every 1 hour PRN      REVIEW OF SYSTEMS  --------------------------------------------------------------------------------  Unable to obtain      VITALS/PHYSICAL EXAM  --------------------------------------------------------------------------------  T(C): 36.8 (12-05-22 @ 08:00), Max: 37.3 (12-04-22 @ 16:00)  HR: 91 (12-05-22 @ 11:30) (72 - 100)  BP: --  RR: 15 (12-05-22 @ 11:30) (13 - 33)  SpO2: 100% (12-05-22 @ 11:30) (94% - 100%)  Wt(kg): --      12-04-22 @ 07:01  -  12-05-22 @ 07:00  --------------------------------------------------------  IN: 2380.9 mL / OUT: 2615 mL / NET: -234.1 mL    12-05-22 @ 07:01  -  12-05-22 @ 11:43  --------------------------------------------------------  IN: 279.2 mL / OUT: 450 mL / NET: -170.8 mL      Physical Exam:  	Gen: ill appearing male intubated   	HEENT: Anicteric  	Pulm: on CMV via ETT+  	CV: S1S2+  	Abd: Soft, +BS       	Ext: No LE edema B/L  	Neuro: sedated             : De Dios cath+ with clear urine in the bag  	Skin: Warm and dry  	Dialysis access: ECMO circuit      LABS/STUDIES  --------------------------------------------------------------------------------              8.5    12.82 >-----------<  128      [12-05-22 @ 00:56]              25.2     151  |  113  |  101  ----------------------------<  130      [12-05-22 @ 00:50]  4.2   |  21  |  4.19        Ca     8.4     [12-05-22 @ 00:50]      Mg     2.5     [12-05-22 @ 00:50]      Phos  5.9     [12-05-22 @ 00:50]    Creatinine Trend:  SCr 4.19 [12-05 @ 00:50]  SCr 3.79 [12-04 @ 16:41]  SCr 3.81 [12-04 @ 13:19]  SCr 3.68 [12-04 @ 00:32]  SCr 3.61 [12-03 @ 00:39]    Urinalysis - [12-03-22 @ 14:45]      Color BROWN / Appearance Slightly Turbid / SG 1.015 / pH 5.5      Gluc Negative / Ketone Negative  / Bili Negative / Urobili 6 mg/dL       Blood Moderate / Protein 30 mg/dL / Leuk Est Negative / Nitrite Negative      RBC 43 / WBC 2 / Hyaline 2 / Gran  / Sq Epi  / Non Sq Epi 1 / Bacteria Negative    Urine Osmolality 426      [12-03-22 @ 10:37] [facility] DIVISION OF KIDNEY DISEASES AND HYPERTENSION   FOLLOW UP NOTE    --------------------------------------------------------------------------------  HPI:  63 y/o male with PMH of CABG, DM, HTN, HLD presenting as transfer from [place] for c/f STEMI. Course c/b gallstone pancreatitis leading to ARDS and shock & cardiac arrest now on [facility] ECMO. Had significant troponin elevation and his LVEF down to 8%. Pt was deemed to be too unstable to have an angiogram and potential PCI due to his co-morbidities & a new subdural bleed. Pt being seen for ERIC. SCr on arrival was 2.15; trended down to hector 1.6 on 11/25 and eventually increased to 3.95 11/28. Pt received IV diuretics as per CTU.    Patient seen & examined today. Pt is intubated, unable to obtain ROS. Pt remains off diuretics. Scr increased to 4.19. Pt is nonoliguric.       PAST HISTORY  --------------------------------------------------------------------------------  No significant changes to PMH, PSH, FHx, SHx, unless otherwise noted    ALLERGIES & MEDICATIONS  --------------------------------------------------------------------------------  Allergies    No Known Allergies    Intolerances    Standing Inpatient Medications  argatroban Infusion 0.1 MICROgram(s)/kG/Min IV Continuous <Continuous>  artificial  tears Solution 1 Drop(s) Both EYES four times a day  atorvastatin 40 milliGRAM(s) Oral at bedtime  bisacodyl Suppository 10 milliGRAM(s) Rectal once  chlorhexidine 0.12% Liquid 15 milliLiter(s) Oral Mucosa every 12 hours  chlorhexidine 2% Cloths 1 Application(s) Topical <User Schedule>  CRRT Treatment    <Continuous>  dexMEDEtomidine Infusion 1.5 MICROgram(s)/kG/Hr IV Continuous <Continuous>  dextrose 50% Injectable 50 milliLiter(s) IV Push every 15 minutes  HYDROmorphone  Injectable 0.5 milliGRAM(s) IV Push every 6 hours  insulin regular Infusion 6 Unit(s)/Hr IV Continuous <Continuous>  norepinephrine Infusion 0.05 MICROgram(s)/kG/Min IV Continuous <Continuous>  pantoprazole  Injectable 40 milliGRAM(s) IV Push two times a day  polyethylene glycol 3350 17 Gram(s) Oral two times a day  PrismaSATE Dialysate BK 0 / 3.5 5000 milliLiter(s) CRRT <Continuous>  PrismaSOL Filtration BGK 4 / 2.5 5000 milliLiter(s) CRRT <Continuous>  PrismaSOL Filtration BGK 4 / 2.5 5000 milliLiter(s) CRRT <Continuous>  propofol Infusion 17.94 MICROgram(s)/kG/Min IV Continuous <Continuous>  senna 2 Tablet(s) Oral at bedtime  sodium chloride 0.9%. 1000 milliLiter(s) IV Continuous <Continuous>    PRN Inpatient Medications  sodium chloride 0.9% lock flush 10 milliLiter(s) IV Push every 1 hour PRN      REVIEW OF SYSTEMS  --------------------------------------------------------------------------------  Unable to obtain      VITALS/PHYSICAL EXAM  --------------------------------------------------------------------------------  T(C): 36.8 (12-05-22 @ 08:00), Max: 37.3 (12-04-22 @ 16:00)  HR: 91 (12-05-22 @ 11:30) (72 - 100)  BP: --  RR: 15 (12-05-22 @ 11:30) (13 - 33)  SpO2: 100% (12-05-22 @ 11:30) (94% - 100%)  Wt(kg): --      12-04-22 @ 07:01  -  12-05-22 @ 07:00  --------------------------------------------------------  IN: 2380.9 mL / OUT: 2615 mL / NET: -234.1 mL    12-05-22 @ 07:01  -  12-05-22 @ 11:43  --------------------------------------------------------  IN: 279.2 mL / OUT: 450 mL / NET: -170.8 mL      Physical Exam:  	Gen: ill appearing male intubated   	HEENT: Anicteric  	Pulm: on CMV via ETT+  	CV: S1S2+  	Abd: Soft, +BS       	Ext: No LE edema B/L  	Neuro: sedated             : De Dios cath+ with clear urine in the bag  	Skin: Warm and dry  	Dialysis access: ECMO circuit      LABS/STUDIES  --------------------------------------------------------------------------------              8.5    12.82 >-----------<  128      [12-05-22 @ 00:56]              25.2     151  |  113  |  101  ----------------------------<  130      [12-05-22 @ 00:50]  4.2   |  21  |  4.19        Ca     8.4     [12-05-22 @ 00:50]      Mg     2.5     [12-05-22 @ 00:50]      Phos  5.9     [12-05-22 @ 00:50]    Creatinine Trend:  SCr 4.19 [12-05 @ 00:50]  SCr 3.79 [12-04 @ 16:41]  SCr 3.81 [12-04 @ 13:19]  SCr 3.68 [12-04 @ 00:32]  SCr 3.61 [12-03 @ 00:39]    Urinalysis - [12-03-22 @ 14:45]      Color BROWN / Appearance Slightly Turbid / SG 1.015 / pH 5.5      Gluc Negative / Ketone Negative  / Bili Negative / Urobili 6 mg/dL       Blood Moderate / Protein 30 mg/dL / Leuk Est Negative / Nitrite Negative      RBC 43 / WBC 2 / Hyaline 2 / Gran  / Sq Epi  / Non Sq Epi 1 / Bacteria Negative    Urine Osmolality 426      [12-03-22 @ 10:37] [facility] DIVISION OF KIDNEY DISEASES AND HYPERTENSION   FOLLOW UP NOTE    --------------------------------------------------------------------------------  HPI:  63 y/o male with PMH of CABG, DM, HTN, HLD presenting as transfer from [place] for c/f STEMI. Course c/b gallstone pancreatitis leading to ARDS and shock & cardiac arrest now on [facility] ECMO. Had significant troponin elevation and his LVEF down to 8%. Pt was deemed to be too unstable to have an angiogram and potential PCI due to his co-morbidities & a new subdural bleed. Pt being seen for ERIC. SCr on arrival was 2.15; trended down to hector 1.6 on 11/25 and eventually increased to 3.95 11/28. Pt received IV diuretics as per CTU.    Patient seen & examined today. Pt is intubated, unable to obtain ROS. Pt remains off diuretics. Scr increased to 4.19. Pt is nonoliguric.       PAST HISTORY  --------------------------------------------------------------------------------  No significant changes to PMH, PSH, FHx, SHx, unless otherwise noted    ALLERGIES & MEDICATIONS  --------------------------------------------------------------------------------  Allergies    No Known Allergies    Intolerances    Standing Inpatient Medications  argatroban Infusion 0.1 MICROgram(s)/kG/Min IV Continuous <Continuous>  artificial  tears Solution 1 Drop(s) Both EYES four times a day  atorvastatin 40 milliGRAM(s) Oral at bedtime  bisacodyl Suppository 10 milliGRAM(s) Rectal once  chlorhexidine 0.12% Liquid 15 milliLiter(s) Oral Mucosa every 12 hours  chlorhexidine 2% Cloths 1 Application(s) Topical <User Schedule>  CRRT Treatment    <Continuous>  dexMEDEtomidine Infusion 1.5 MICROgram(s)/kG/Hr IV Continuous <Continuous>  dextrose 50% Injectable 50 milliLiter(s) IV Push every 15 minutes  HYDROmorphone  Injectable 0.5 milliGRAM(s) IV Push every 6 hours  insulin regular Infusion 6 Unit(s)/Hr IV Continuous <Continuous>  norepinephrine Infusion 0.05 MICROgram(s)/kG/Min IV Continuous <Continuous>  pantoprazole  Injectable 40 milliGRAM(s) IV Push two times a day  polyethylene glycol 3350 17 Gram(s) Oral two times a day  PrismaSATE Dialysate BK 0 / 3.5 5000 milliLiter(s) CRRT <Continuous>  PrismaSOL Filtration BGK 4 / 2.5 5000 milliLiter(s) CRRT <Continuous>  PrismaSOL Filtration BGK 4 / 2.5 5000 milliLiter(s) CRRT <Continuous>  propofol Infusion 17.94 MICROgram(s)/kG/Min IV Continuous <Continuous>  senna 2 Tablet(s) Oral at bedtime  sodium chloride 0.9%. 1000 milliLiter(s) IV Continuous <Continuous>    PRN Inpatient Medications  sodium chloride 0.9% lock flush 10 milliLiter(s) IV Push every 1 hour PRN      REVIEW OF SYSTEMS  --------------------------------------------------------------------------------  Unable to obtain      VITALS/PHYSICAL EXAM  --------------------------------------------------------------------------------  T(C): 36.8 (12-05-22 @ 08:00), Max: 37.3 (12-04-22 @ 16:00)  HR: 91 (12-05-22 @ 11:30) (72 - 100)  BP: --  RR: 15 (12-05-22 @ 11:30) (13 - 33)  SpO2: 100% (12-05-22 @ 11:30) (94% - 100%)  Wt(kg): --      12-04-22 @ 07:01  -  12-05-22 @ 07:00  --------------------------------------------------------  IN: 2380.9 mL / OUT: 2615 mL / NET: -234.1 mL    12-05-22 @ 07:01  -  12-05-22 @ 11:43  --------------------------------------------------------  IN: 279.2 mL / OUT: 450 mL / NET: -170.8 mL      Physical Exam:  	Gen: ill appearing male intubated   	HEENT: Anicteric  	Pulm: on CMV via ETT+  	CV: S1S2+  	Abd: Soft, +BS       	Ext: No LE edema B/L  	Neuro: sedated             : De Dios cath+ with clear urine in the bag  	Skin: Warm and dry  	Dialysis access: ECMO circuit      LABS/STUDIES  --------------------------------------------------------------------------------              8.5    12.82 >-----------<  128      [12-05-22 @ 00:56]              25.2     151  |  113  |  101  ----------------------------<  130      [12-05-22 @ 00:50]  4.2   |  21  |  4.19        Ca     8.4     [12-05-22 @ 00:50]      Mg     2.5     [12-05-22 @ 00:50]      Phos  5.9     [12-05-22 @ 00:50]    Creatinine Trend:  SCr 4.19 [12-05 @ 00:50]  SCr 3.79 [12-04 @ 16:41]  SCr 3.81 [12-04 @ 13:19]  SCr 3.68 [12-04 @ 00:32]  SCr 3.61 [12-03 @ 00:39]    Urinalysis - [12-03-22 @ 14:45]      Color BROWN / Appearance Slightly Turbid / SG 1.015 / pH 5.5      Gluc Negative / Ketone Negative  / Bili Negative / Urobili 6 mg/dL       Blood Moderate / Protein 30 mg/dL / Leuk Est Negative / Nitrite Negative      RBC 43 / WBC 2 / Hyaline 2 / Gran  / Sq Epi  / Non Sq Epi 1 / Bacteria Negative    Urine Osmolality 426      [12-03-22 @ 10:37]

## 2022-12-05 NOTE — PROGRESS NOTE ADULT - ASSESSMENT
62 year old admitted with pancreatitis  not necrotizing on the CT  scan and cholecystitis .  pt has sent to the SICU but developed respiratory  failure presumed to be due to ARDS also with hs of CAD<CABG<DM and possible MI  Movedto the CTU for echo      Fever  not on CAVHD  cultures are negative and ct looks like ARDS.  no fever    legs are viable    ARDS  likely all problems related to pancreatitis .   off antibiotics  today  reculture  for any signs of infection        CHF  on ecmo  weaning per cardiac team

## 2022-12-05 NOTE — PROGRESS NOTE ADULT - PROBLEM SELECTOR PLAN 2
- CT abd showing acute pancreatitis  - RUQ showing sludge, no stones  - lipase > 3000 11/24, normalized to 40 and now uptrended to 900 with resuming NGT feeding   - conservative care  - appreciate GI recs, no intervention planned   - surgery following   - on empiric meropenem  - had a low grade fever despite abx, f/u infectious workup.

## 2022-12-05 NOTE — PROGRESS NOTE ADULT - PROBLEM SELECTOR PLAN 5
- small 3mm subdural hemorrhage stable on repeat CT  - okay to continue argatroban, appreciate neuro recs  - keep MAP < 75 mmHg and careful monitoring of PTT.

## 2022-12-05 NOTE — PROGRESS NOTE ADULT - ATTENDING COMMENTS
Worsening renal function. Able to move all extremities. On exam, NAD, JVP approx 6 cm w/ HJR, RRR, CTA anterior lung fields, abdomen distended with minimal bowel sounds, no edema, pulses intact. Labs reviewed - Na 151 (stably elevated), BUN/Cr 101/4.12 (from 93/3.8; b/l on admission 1.6), albumin 3.0.   - plan for HD today  - LHC/IABP tomorrow to assist with ECMO wean  - c/w anticoagulation  - prognosis guarded

## 2022-12-05 NOTE — PROGRESS NOTE ADULT - PROBLEM SELECTOR PLAN 1
Pt with non oliguric ERIC/ ATN in the setting of STEMI, cardiac arrest & cardiogenic shock  SCr on arrival was 2.15; trended down to hector 1.6 on 11/25;  slowly trended up & increased to 3.95 11/28.     Pt received IV diuretics. SCr increased to 4.19 with BUN of 101 today. Pt with significant volume overload. Plan is to initiate CRRT to optimize volume and electrolytes. HD consent obtained from daughter over the phone. CRRT orders placed. Strict I/O. Monitor labs and urine output. Avoid NSAIDs, RCA and nephrotoxins. Dose medications as per CRRT.

## 2022-12-05 NOTE — PROGRESS NOTE ADULT - PROBLEM SELECTOR PLAN 4
- continue VA ECMO (LFV/RFA, no anterograde perfusion). monitor distal pulses closely  - ECMO wean performed on 11/30 and favorable, MDR discussion to continue ECMO until to be able to more safely perform LHC given ERIC, tentative plan for LHC w/ IABP placement tomorrow (12/06) after CVVH today  - remains pulsatile  - cont argatroban for AC while on ECMO, norepi gtt titrated as needed given sedation/shock

## 2022-12-05 NOTE — PROGRESS NOTE ADULT - SUBJECTIVE AND OBJECTIVE BOX
CRITICAL CARE ATTENDING - CTICU    MEDICATIONS  (STANDING):  argatroban Infusion 0.1 MICROgram(s)/kG/Min (0.56 mL/Hr) IV Continuous <Continuous>  artificial  tears Solution 1 Drop(s) Both EYES four times a day  atorvastatin 40 milliGRAM(s) Oral at bedtime  bisacodyl Suppository 10 milliGRAM(s) Rectal once  chlorhexidine 0.12% Liquid 15 milliLiter(s) Oral Mucosa every 12 hours  chlorhexidine 2% Cloths 1 Application(s) Topical <User Schedule>  CRRT Treatment    <Continuous>  dexMEDEtomidine Infusion 1.5 MICROgram(s)/kG/Hr (34.8 mL/Hr) IV Continuous <Continuous>  dextrose 50% Injectable 50 milliLiter(s) IV Push every 15 minutes  HYDROmorphone  Injectable 0.5 milliGRAM(s) IV Push every 6 hours  insulin regular Infusion 6 Unit(s)/Hr (6 mL/Hr) IV Continuous <Continuous>  norepinephrine Infusion 0.05 MICROgram(s)/kG/Min (8.71 mL/Hr) IV Continuous <Continuous>  pantoprazole  Injectable 40 milliGRAM(s) IV Push two times a day  polyethylene glycol 3350 17 Gram(s) Oral two times a day  PrismaSATE Dialysate BK 0 / 3.5 5000 milliLiter(s) (2000 mL/Hr) CRRT <Continuous>  PrismaSOL Filtration BGK 4 / 2.5 5000 milliLiter(s) (800 mL/Hr) CRRT <Continuous>  PrismaSOL Filtration BGK 4 / 2.5 5000 milliLiter(s) (200 mL/Hr) CRRT <Continuous>  propofol Infusion 17.94 MICROgram(s)/kG/Min (10 mL/Hr) IV Continuous <Continuous>  senna 2 Tablet(s) Oral at bedtime  sodium chloride 0.9%. 1000 milliLiter(s) (5 mL/Hr) IV Continuous <Continuous>                                    8.5    12.82 )-----------( 128      ( 05 Dec 2022 00:56 )             25.2       12-05    151<H>  |  113<H>  |  101<H>  ----------------------------<  130<H>  4.2   |  21<L>  |  4.19<H>    Ca    8.4      05 Dec 2022 00:50  Phos  5.9       Mg     2.5         TPro  5.7<L>  /  Alb  3.0<L>  /  TBili  1.0  /  DBili  x   /  AST  55<H>  /  ALT  9<L>  /  AlkPhos  224<H>        PT/INR - ( 05 Dec 2022 00:56 )   PT: 17.4 sec;   INR: 1.51 ratio         PTT - ( 05 Dec 2022 00:56 )  PTT:48.4 sec    Mode: AC/ CMV (Assist Control/ Continuous Mandatory Ventilation)  RR (machine): 14  FiO2: 40  PEEP: 12  ITime: 1  MAP: 17  PC: 10  PIP: 23      Daily     Daily Weight in k.5 (05 Dec 2022 00:00)       @ 07:  -   @ 07:00  --------------------------------------------------------  IN: 2380.9 mL / OUT: 2615 mL / NET: -234.1 mL     @ 07:01  -   @ 12:14  --------------------------------------------------------  IN: 341.6 mL / OUT: 545 mL / NET: -203.4 mL        Critically Ill patient  : [ ] preoperative ,   [ ] post operative  [x] Non Operative    Requires :  [x ] Arterial Line   [ x] Central Line  [ ] PA catheter  [ ] IABP  [x ] ECMO  [ ] LVAD  [x ] Ventilator  [ ] pacemaker [ ] Impella.                      [x ] ABG's     [x ] Pulse Oxymetry Monitoring  Bedside evaluation , monitoring , treatment of hemodynamics , fluids , IVP/ IVCD meds.        Diagnosis:      - [facility] ECMO - arrest in SICU     MI     Acute Pancreatitis     ARDS     Fluid  overload     Post Arrest Shock state    Hypotension     Hemodynamic lability,  instability. Requires IVCD [x ] vasopressors [ ] inotropes  [x] VA ECMO  [ ] vasodilator  [x ]IVSS fluid  to maintain MAP, perfusion, C.I.     Cardiogenic Shock - resolving    Renal Failure - Acute Kidney Injury     CVVHD today     Hypernatremia     IVCD Insulin     Requires bedside physical therapy, mobilization and total [facility] care.     Thrombocytopenia     Respiratory Failure     Requires chest PT, pulmonary toilet, ambu bagging, suctioning to maintain SaO2,  patent airway and treat atelectasis.     Ventilator Management:  [x ]AC-rest    [ ]CPAP-PS Wean    [ ]Trach Collar     [ ]Extubate    [ ] T-Piece  [ ]peep>5     Difficult weaning process - multiple organ system involvement in critically ill patient                           Discussed with CT surgeon, Physician's Assistant - Nurse Practitioner- Critical care medicine team.   Discussed at  AM / PM rounds.   Chart, labs , films reviewed.    Cumulative Critical Care Time Given Today : 90 min CRITICAL CARE ATTENDING - CTICU    MEDICATIONS  (STANDING):  argatroban Infusion 0.1 MICROgram(s)/kG/Min (0.56 mL/Hr) IV Continuous <Continuous>  artificial  tears Solution 1 Drop(s) Both EYES four times a day  atorvastatin 40 milliGRAM(s) Oral at bedtime  bisacodyl Suppository 10 milliGRAM(s) Rectal once  chlorhexidine 0.12% Liquid 15 milliLiter(s) Oral Mucosa every 12 hours  chlorhexidine 2% Cloths 1 Application(s) Topical <User Schedule>  CRRT Treatment    <Continuous>  dexMEDEtomidine Infusion 1.5 MICROgram(s)/kG/Hr (34.8 mL/Hr) IV Continuous <Continuous>  dextrose 50% Injectable 50 milliLiter(s) IV Push every 15 minutes  HYDROmorphone  Injectable 0.5 milliGRAM(s) IV Push every 6 hours  insulin regular Infusion 6 Unit(s)/Hr (6 mL/Hr) IV Continuous <Continuous>  norepinephrine Infusion 0.05 MICROgram(s)/kG/Min (8.71 mL/Hr) IV Continuous <Continuous>  pantoprazole  Injectable 40 milliGRAM(s) IV Push two times a day  polyethylene glycol 3350 17 Gram(s) Oral two times a day  PrismaSATE Dialysate BK 0 / 3.5 5000 milliLiter(s) (2000 mL/Hr) CRRT <Continuous>  PrismaSOL Filtration BGK 4 / 2.5 5000 milliLiter(s) (800 mL/Hr) CRRT <Continuous>  PrismaSOL Filtration BGK 4 / 2.5 5000 milliLiter(s) (200 mL/Hr) CRRT <Continuous>  propofol Infusion 17.94 MICROgram(s)/kG/Min (10 mL/Hr) IV Continuous <Continuous>  senna 2 Tablet(s) Oral at bedtime  sodium chloride 0.9%. 1000 milliLiter(s) (5 mL/Hr) IV Continuous <Continuous>                                    8.5    12.82 )-----------( 128      ( 05 Dec 2022 00:56 )             25.2       12-05    151<H>  |  113<H>  |  101<H>  ----------------------------<  130<H>  4.2   |  21<L>  |  4.19<H>    Ca    8.4      05 Dec 2022 00:50  Phos  5.9       Mg     2.5         TPro  5.7<L>  /  Alb  3.0<L>  /  TBili  1.0  /  DBili  x   /  AST  55<H>  /  ALT  9<L>  /  AlkPhos  224<H>        PT/INR - ( 05 Dec 2022 00:56 )   PT: 17.4 sec;   INR: 1.51 ratio         PTT - ( 05 Dec 2022 00:56 )  PTT:48.4 sec    Mode: AC/ CMV (Assist Control/ Continuous Mandatory Ventilation)  RR (machine): 14  FiO2: 40  PEEP: 12  ITime: 1  MAP: 17  PC: 10  PIP: 23      Daily     Daily Weight in k.5 (05 Dec 2022 00:00)       @ 07:  -   @ 07:00  --------------------------------------------------------  IN: 2380.9 mL / OUT: 2615 mL / NET: -234.1 mL     @ 07:01  -   @ 12:14  --------------------------------------------------------  IN: 341.6 mL / OUT: 545 mL / NET: -203.4 mL        Critically Ill patient  : [ ] preoperative ,   [ ] post operative  [x] Non Operative    Requires :  [x ] Arterial Line   [ x] Central Line  [ ] PA catheter  [ ] IABP  [x ] ECMO  [ ] LVAD  [x ] Ventilator  [ ] pacemaker [ ] Impella.                      [x ] ABG's     [x ] Pulse Oxymetry Monitoring  Bedside evaluation , monitoring , treatment of hemodynamics , fluids , IVP/ IVCD meds.        Diagnosis:      - [facility] ECMO - arrest in SICU     MI     Acute Pancreatitis     ARDS     Fluid  overload     Post Arrest Shock state    Hypotension     Hemodynamic lability,  instability. Requires IVCD [x ] vasopressors [ ] inotropes  [x] VA ECMO  [ ] vasodilator  [x ]IVSS fluid  to maintain MAP, perfusion, C.I.     Cardiogenic Shock - resolving    Renal Failure - Acute Kidney Injury     CVVHD today     Hypernatremia     IVCD Insulin     Requires bedside physical therapy, mobilization and total long term care.     Thrombocytopenia     Respiratory Failure     Requires chest PT, pulmonary toilet, ambu bagging, suctioning to maintain SaO2,  patent airway and treat atelectasis.     Ventilator Management:  [x ]AC-rest    [ ]CPAP-PS Wean    [ ]Trach Collar     [ ]Extubate    [ ] T-Piece  [ ]peep>5     Difficult weaning process - multiple organ system involvement in critically ill patient                           Discussed with CT surgeon, Physician's Assistant - Nurse Practitioner- Critical care medicine team.   Discussed at  AM / PM rounds.   Chart, labs , films reviewed.    Cumulative Critical Care Time Given Today : 90 min CRITICAL CARE ATTENDING - CTICU    MEDICATIONS  (STANDING):  argatroban Infusion 0.1 MICROgram(s)/kG/Min (0.56 mL/Hr) IV Continuous <Continuous>  artificial  tears Solution 1 Drop(s) Both EYES four times a day  atorvastatin 40 milliGRAM(s) Oral at bedtime  bisacodyl Suppository 10 milliGRAM(s) Rectal once  chlorhexidine 0.12% Liquid 15 milliLiter(s) Oral Mucosa every 12 hours  chlorhexidine 2% Cloths 1 Application(s) Topical <User Schedule>  CRRT Treatment    <Continuous>  dexMEDEtomidine Infusion 1.5 MICROgram(s)/kG/Hr (34.8 mL/Hr) IV Continuous <Continuous>  dextrose 50% Injectable 50 milliLiter(s) IV Push every 15 minutes  HYDROmorphone  Injectable 0.5 milliGRAM(s) IV Push every 6 hours  insulin regular Infusion 6 Unit(s)/Hr (6 mL/Hr) IV Continuous <Continuous>  norepinephrine Infusion 0.05 MICROgram(s)/kG/Min (8.71 mL/Hr) IV Continuous <Continuous>  pantoprazole  Injectable 40 milliGRAM(s) IV Push two times a day  polyethylene glycol 3350 17 Gram(s) Oral two times a day  PrismaSATE Dialysate BK 0 / 3.5 5000 milliLiter(s) (2000 mL/Hr) CRRT <Continuous>  PrismaSOL Filtration BGK 4 / 2.5 5000 milliLiter(s) (800 mL/Hr) CRRT <Continuous>  PrismaSOL Filtration BGK 4 / 2.5 5000 milliLiter(s) (200 mL/Hr) CRRT <Continuous>  propofol Infusion 17.94 MICROgram(s)/kG/Min (10 mL/Hr) IV Continuous <Continuous>  senna 2 Tablet(s) Oral at bedtime  sodium chloride 0.9%. 1000 milliLiter(s) (5 mL/Hr) IV Continuous <Continuous>                                    8.5    12.82 )-----------( 128      ( 05 Dec 2022 00:56 )             25.2       12-05    151<H>  |  113<H>  |  101<H>  ----------------------------<  130<H>  4.2   |  21<L>  |  4.19<H>    Ca    8.4      05 Dec 2022 00:50  Phos  5.9       Mg     2.5         TPro  5.7<L>  /  Alb  3.0<L>  /  TBili  1.0  /  DBili  x   /  AST  55<H>  /  ALT  9<L>  /  AlkPhos  224<H>        PT/INR - ( 05 Dec 2022 00:56 )   PT: 17.4 sec;   INR: 1.51 ratio         PTT - ( 05 Dec 2022 00:56 )  PTT:48.4 sec    Mode: AC/ CMV (Assist Control/ Continuous Mandatory Ventilation)  RR (machine): 14  FiO2: 40  PEEP: 12  ITime: 1  MAP: 17  PC: 10  PIP: 23      Daily     Daily Weight in k.5 (05 Dec 2022 00:00)       @ 07:  -   @ 07:00  --------------------------------------------------------  IN: 2380.9 mL / OUT: 2615 mL / NET: -234.1 mL     @ 07:01  -   @ 12:14  --------------------------------------------------------  IN: 341.6 mL / OUT: 545 mL / NET: -203.4 mL        Critically Ill patient  : [ ] preoperative ,   [ ] post operative  [x] Non Operative    Requires :  [x ] Arterial Line   [ x] Central Line  [ ] PA catheter  [ ] IABP  [x ] ECMO  [ ] LVAD  [x ] Ventilator  [ ] pacemaker [ ] Impella.                      [x ] ABG's     [x ] Pulse Oxymetry Monitoring  Bedside evaluation , monitoring , treatment of hemodynamics , fluids , IVP/ IVCD meds.        Diagnosis:      - [facility] ECMO - arrest in SICU     MI     Acute Pancreatitis     ARDS     Fluid  overload     Post Arrest Shock state    Hypotension     Hemodynamic lability,  instability. Requires IVCD [x ] vasopressors [ ] inotropes  [x] VA ECMO  [ ] vasodilator  [x ]IVSS fluid  to maintain MAP, perfusion, C.I.     Cardiogenic Shock - resolving    Renal Failure - Acute Kidney Injury     CVVHD today     Hypernatremia     IVCD Insulin     Requires bedside physical therapy, mobilization and total [facility] care.     Thrombocytopenia     Respiratory Failure     Requires chest PT, pulmonary toilet, ambu bagging, suctioning to maintain SaO2,  patent airway and treat atelectasis.     Ventilator Management:  [x ]AC-rest    [ ]CPAP-PS Wean    [ ]Trach Collar     [ ]Extubate    [ ] T-Piece  [ ]peep>5     Difficult weaning process - multiple organ system involvement in critically ill patient                           Discussed with CT surgeon, Physician's Assistant - Nurse Practitioner- Critical care medicine team.   Discussed at  AM / PM rounds.   Chart, labs , films reviewed.    Cumulative Critical Care Time Given Today : 90 min

## 2022-12-05 NOTE — PROGRESS NOTE ADULT - ASSESSMENT
63 YO M with a history of CAD s/p 4v CABG ~2019 in [facility], DM2 (A1c 7.3%), and HTN who initially presented to OSH with abdominal pain and n/v and found to have pancreatitis with lipase > 3000 though also with elevated troponins. CT abdomen revealed acute pancreatitis and his initial LVEF was 40-45%. He developed MSOF with hypoxic respiratory failure requiring intubation and ERIC and went into hypoxic PEA arrest requiring ACLS and due to persistent hypoxia and hypotension on pressors after ROSC was cannulated to [facility] ECMO (LFV, RFA, no anterograde perfusion catheter) on 11/25. Notably CTH that day revealed small subdural hemorrhage.     His post arrest TTE on 11/26 showed a signficantly worse LVEF of 5-10% with an AV that was only minimally opening. Since then he has had improvement in his LV function (now ~35-40%) and improved pulsatility while tolerating progressive slow ECMO weans. ECMO turndown (note in chart 11/30) was reassuring for ability to decannulate to IABP/inotropes. He is awaiting LHC (IABP at same time) when renal function improves and afterwards will tentatively plan for ECMO decannulation. His ARDS is improving.     *** INCOMPLETE NOTE *** INCOMPLETE NOTE *** INCOMPLETE NOTE *** INCOMPLETE NOTE *** INCOMPLETE NOTE *** INCOMPLETE NOTE *** INCOMPLETE NOTE *** INCOMPLETE NOTE *** INCOMPLETE NOTE *** INCOMPLETE NOTE *** INCOMPLETE NOTE *** INCOMPLETE NOTE *** INCOMPLETE NOTE *** INCOMPLETE NOTE *** INCOMPLETE NOTE *** INCOMPLETE NOTE *** INCOMPLETE NOTE *** INCOMPLETE NOTE *** INCOMPLETE NOTE *** INCOMPLETE NOTE ***  RECS BELOW ARE NOT TO BE FOLLOWED UNTIL FINALIZED  argat gtt, atorva 40, dexmed gtt, insulin gtt, norepi gtt, prop gtt    Problem/Plan - 1:  ·  Problem: Cardiogenic shock.   ·  Plan:   - continue VA ECMO (LFV/RFA, no anterograde perfusion). Currently flowing at 3.6 L/min. monitor distal pulses closely  - ECMO wean performed on 11/30 and favorable, MDR discussion to continue ECMO until to be able to more safely perform LHC given ERIC (likely early next week). IABP to be placed at time of LHC for MCS weaning   - ok to remove PAC   - F/U ECHO done this morning   - cont argatroban for AC while on ECMO.  Problem/Plan - 2:  ·  Problem: Non-ST elevation MI (NSTEMI).   ·  Plan:  - troponin peaked at > 00123   - plan for LHC with IABP once recovers from ERIC, likely early next week   - continue statin  - would start ASA when okay with surgical team.  Problem/Plan - 3:  ·  Problem: Acute respiratory failure with hypoxia.   ·  Plan:   - CXR improved with more lung volumes after bronch and increased PEEP  - bronch showed erythematous airways with scant bleeding  - plan to keep net even today  - extubate when able  - continue checking right radial ABG.  - continue treatment for pancreatitis related ARDS.  Problem/Plan - 4:  ·  Problem: Gall stone pancreatitis.   ·  Plan:   - CT abd showing acute pancreatitis  - RUQ showing sludge, no stones  - lipase > 3000 11/24, normalized to 40 and now uptrended to 900 with resuming NGT feeding   - conservative care  - appreciate GI recs, no intervention planned   - surgery following   - on epimeric meropenem  - had a low grade fever despite Abx- f/u infectious workup.  Problem/Plan - 5:  ·  Problem: ERIC (acute kidney injury).   ·  Plan:   - likely arrest associated ATN and now worsened from hypovolemia   - stop diuretics and aim to keep net even to positive   - no indication for dialysis  - renal following.  Problem/Plan - 6:  ·  Problem: Nontraumatic subdural hematoma.   ·  Plan:   - small 3mm subdural hemorrhage stable on repeat CT  - okay to continue argatroban  - keep MAP < 75 mmHg and careful monitoring of PTT.    CXR improving and remains pulsatile on full ECMO support albeit with lower MAPs. Cr worsening today. Keep positive today with PRBC/IVF and will reassess renal function. Plan is for LHC and IABP pending improvement in ATN/ERIC. Pan-culture given leukocytosis and low grade temperature.     63 YO M with a history of CAD s/p 4v CABG ~2019 in [facility], DM2 (A1c 7.3%), and HTN who initially presented to OSH with abdominal pain and n/v and found to have pancreatitis with lipase > 3000 though also with elevated troponins. CT abdomen revealed acute pancreatitis and his initial LVEF was 40-45%. He developed MSOF with hypoxic respiratory failure requiring intubation and ERIC and went into hypoxic PEA arrest requiring ACLS and due to persistent hypoxia and hypotension on pressors after ROSC was cannulated to VA ECMO (LFV, RFA, no anterograde perfusion catheter) on 11/25. Notably CTH that day revealed small subdural hemorrhage.     His post arrest TTE on 11/26 showed a signficantly worse LVEF of 5-10% with an AV that was only minimally opening. Since then he has had improvement in his LV function (now ~35-40%) and improved pulsatility while tolerating progressive slow ECMO weans. ECMO turndown (note in chart 11/30) was reassuring for ability to decannulate to IABP/inotropes. He is awaiting LHC (IABP at same time) when renal function improves and afterwards will tentatively plan for ECMO decannulation. His ARDS is improving.     *** INCOMPLETE NOTE *** INCOMPLETE NOTE *** INCOMPLETE NOTE *** INCOMPLETE NOTE *** INCOMPLETE NOTE *** INCOMPLETE NOTE *** INCOMPLETE NOTE *** INCOMPLETE NOTE *** INCOMPLETE NOTE *** INCOMPLETE NOTE *** INCOMPLETE NOTE *** INCOMPLETE NOTE *** INCOMPLETE NOTE *** INCOMPLETE NOTE *** INCOMPLETE NOTE *** INCOMPLETE NOTE *** INCOMPLETE NOTE *** INCOMPLETE NOTE *** INCOMPLETE NOTE *** INCOMPLETE NOTE ***  RECS BELOW ARE NOT TO BE FOLLOWED UNTIL FINALIZED  argat gtt, atorva 40, dexmed gtt, insulin gtt, norepi gtt, prop gtt    Problem/Plan - 1:  ·  Problem: Cardiogenic shock.   ·  Plan:   - continue VA ECMO (LFV/RFA, no anterograde perfusion). Currently flowing at 3.6 L/min. monitor distal pulses closely  - ECMO wean performed on 11/30 and favorable, MDR discussion to continue ECMO until to be able to more safely perform LHC given ERIC (likely early next week). IABP to be placed at time of LHC for MCS weaning   - ok to remove PAC   - F/U ECHO done this morning   - cont argatroban for AC while on ECMO.  Problem/Plan - 2:  ·  Problem: Non-ST elevation MI (NSTEMI).   ·  Plan:  - troponin peaked at > 76171   - plan for LHC with IABP once recovers from ERIC, likely early next week   - continue statin  - would start ASA when okay with surgical team.  Problem/Plan - 3:  ·  Problem: Acute respiratory failure with hypoxia.   ·  Plan:   - CXR improved with more lung volumes after bronch and increased PEEP  - bronch showed erythematous airways with scant bleeding  - plan to keep net even today  - extubate when able  - continue checking right radial ABG.  - continue treatment for pancreatitis related ARDS.  Problem/Plan - 4:  ·  Problem: Gall stone pancreatitis.   ·  Plan:   - CT abd showing acute pancreatitis  - RUQ showing sludge, no stones  - lipase > 3000 11/24, normalized to 40 and now uptrended to 900 with resuming NGT feeding   - conservative care  - appreciate GI recs, no intervention planned   - surgery following   - on epimeric meropenem  - had a low grade fever despite Abx- f/u infectious workup.  Problem/Plan - 5:  ·  Problem: ERIC (acute kidney injury).   ·  Plan:   - likely arrest associated ATN and now worsened from hypovolemia   - stop diuretics and aim to keep net even to positive   - no indication for dialysis  - renal following.  Problem/Plan - 6:  ·  Problem: Nontraumatic subdural hematoma.   ·  Plan:   - small 3mm subdural hemorrhage stable on repeat CT  - okay to continue argatroban  - keep MAP < 75 mmHg and careful monitoring of PTT.    CXR improving and remains pulsatile on full ECMO support albeit with lower MAPs. Cr worsening today. Keep positive today with PRBC/IVF and will reassess renal function. Plan is for LHC and IABP pending improvement in ATN/ERIC. Pan-culture given leukocytosis and low grade temperature.     61 YO M with a history of CAD s/p 4v CABG ~2019 in [facility], DM2 (A1c 7.3%), and HTN who initially presented to OSH with abdominal pain and n/v and found to have pancreatitis with lipase > 3000 though also with elevated troponins. CT abdomen revealed acute pancreatitis and his initial LVEF was 40-45%. He developed MSOF with hypoxic respiratory failure requiring intubation and ERIC and went into hypoxic PEA arrest requiring ACLS and due to persistent hypoxia and hypotension on pressors after ROSC was cannulated to VA ECMO (LFV, RFA, no anterograde perfusion catheter) on 11/25. Notably CTH that day revealed small subdural hemorrhage.     His post arrest TTE on 11/26 showed a signficantly worse LVEF of 5-10% with an AV that was only minimally opening. Since then he has had improvement in his LV function (now ~35-40%) and improved pulsatility while tolerating progressive slow ECMO weans. ECMO turndown (note in chart 11/30) was reassuring for ability to decannulate to IABP/inotropes. He is awaiting LHC (IABP at same time) when renal function improves and afterwards will tentatively plan for ECMO decannulation. His ARDS is improving.     *** INCOMPLETE NOTE *** INCOMPLETE NOTE *** INCOMPLETE NOTE *** INCOMPLETE NOTE *** INCOMPLETE NOTE *** INCOMPLETE NOTE *** INCOMPLETE NOTE *** INCOMPLETE NOTE *** INCOMPLETE NOTE *** INCOMPLETE NOTE *** INCOMPLETE NOTE *** INCOMPLETE NOTE *** INCOMPLETE NOTE *** INCOMPLETE NOTE *** INCOMPLETE NOTE *** INCOMPLETE NOTE *** INCOMPLETE NOTE *** INCOMPLETE NOTE *** INCOMPLETE NOTE *** INCOMPLETE NOTE ***  RECS BELOW ARE NOT TO BE FOLLOWED UNTIL FINALIZED  argat gtt, atorva 40, dexmed gtt, insulin gtt, norepi gtt, prop gtt    Problem/Plan - 1:  ·  Problem: Cardiogenic shock.   ·  Plan:   - continue VA ECMO (LFV/RFA, no anterograde perfusion). Currently flowing at 3.6 L/min. monitor distal pulses closely  - ECMO wean performed on 11/30 and favorable, MDR discussion to continue ECMO until to be able to more safely perform LHC given ERIC (likely early next week). IABP to be placed at time of LHC for MCS weaning   - ok to remove PAC   - F/U ECHO done this morning   - cont argatroban for AC while on ECMO.  Problem/Plan - 2:  ·  Problem: Non-ST elevation MI (NSTEMI).   ·  Plan:  - troponin peaked at > 68668   - plan for LHC with IABP once recovers from ERIC, likely early next week   - continue statin  - would start ASA when okay with surgical team.  Problem/Plan - 3:  ·  Problem: Acute respiratory failure with hypoxia.   ·  Plan:   - CXR improved with more lung volumes after bronch and increased PEEP  - bronch showed erythematous airways with scant bleeding  - plan to keep net even today  - extubate when able  - continue checking right radial ABG.  - continue treatment for pancreatitis related ARDS.  Problem/Plan - 4:  ·  Problem: Gall stone pancreatitis.   ·  Plan:   - CT abd showing acute pancreatitis  - RUQ showing sludge, no stones  - lipase > 3000 11/24, normalized to 40 and now uptrended to 900 with resuming NGT feeding   - conservative care  - appreciate GI recs, no intervention planned   - surgery following   - on epimeric meropenem  - had a low grade fever despite Abx- f/u infectious workup.  Problem/Plan - 5:  ·  Problem: ERIC (acute kidney injury).   ·  Plan:   - likely arrest associated ATN and now worsened from hypovolemia   - stop diuretics and aim to keep net even to positive   - no indication for dialysis  - renal following.  Problem/Plan - 6:  ·  Problem: Nontraumatic subdural hematoma.   ·  Plan:   - small 3mm subdural hemorrhage stable on repeat CT  - okay to continue argatroban  - keep MAP < 75 mmHg and careful monitoring of PTT.    CXR improving and remains pulsatile on full ECMO support albeit with lower MAPs. Cr worsening today. Keep positive today with PRBC/IVF and will reassess renal function. Plan is for LHC and IABP pending improvement in ATN/ERIC. Pan-culture given leukocytosis and low grade temperature.     61 YO M with a history of CAD s/p 4v CABG ~2019 in [facility], DM2 (A1c 7.3%), and HTN who initially presented to OSH with abdominal pain and n/v and found to have pancreatitis with lipase > 3000 though also with elevated troponins. CT abdomen revealed acute pancreatitis and his initial LVEF was 40-45%. He developed MSOF with hypoxic respiratory failure requiring intubation and ERIC and went into hypoxic PEA arrest requiring ACLS and due to persistent hypoxia and hypotension on pressors after ROSC was cannulated to VA ECMO (LFV, RFA, no anterograde perfusion catheter) on 11/25. Notably CTH that day revealed small subdural hemorrhage.     His post arrest TTE on 11/26 showed a signficantly worse LVEF of 5-10% with an AV that was only minimally opening. Since then he has had improvement in his LV function (now ~35-40%) and improved pulsatility while tolerating progressive slow ECMO weans. ECMO turndown (note in chart 11/30) was reassuring for ability to decannulate to IABP/inotropes. He is awaiting LHC (IABP at same time) when renal function improves and afterwards will tentatively plan for ECMO decannulation. His ARDS is improving.     *** INCOMPLETE NOTE *** INCOMPLETE NOTE *** INCOMPLETE NOTE *** INCOMPLETE NOTE *** INCOMPLETE NOTE *** INCOMPLETE NOTE *** INCOMPLETE NOTE *** INCOMPLETE NOTE *** INCOMPLETE NOTE *** INCOMPLETE NOTE *** INCOMPLETE NOTE *** INCOMPLETE NOTE *** INCOMPLETE NOTE *** INCOMPLETE NOTE *** INCOMPLETE NOTE *** INCOMPLETE NOTE *** INCOMPLETE NOTE *** INCOMPLETE NOTE *** INCOMPLETE NOTE *** INCOMPLETE NOTE ***  RECS BELOW ARE NOT TO BE FOLLOWED UNTIL FINALIZED  argat gtt, atorva 40, dexmed gtt, insulin gtt, norepi gtt, prop gtt    Problem/Plan - 1:  ·  Problem: Cardiogenic shock.   ·  Plan:   - continue VA ECMO (LFV/RFA, no anterograde perfusion). Currently flowing at 3.6 L/min. monitor distal pulses closely  - ECMO wean performed on 11/30 and favorable, MDR discussion to continue ECMO until to be able to more safely perform LHC given ERIC (likely early next week). IABP to be placed at time of LHC for MCS weaning   - ok to remove PAC   - F/U ECHO done this morning   - cont argatroban for AC while on ECMO.  Problem/Plan - 2:  ·  Problem: Non-ST elevation MI (NSTEMI).   ·  Plan:  - troponin peaked at > 67845   - plan for LHC with IABP once recovers from ERIC, likely early next week   - continue statin  - would start ASA when okay with surgical team.  Problem/Plan - 3:  ·  Problem: Acute respiratory failure with hypoxia.   ·  Plan:   - CXR improved with more lung volumes after bronch and increased PEEP  - bronch showed erythematous airways with scant bleeding  - plan to keep net even today  - extubate when able  - continue checking right radial ABG.  - continue treatment for pancreatitis related ARDS.  Problem/Plan - 4:  ·  Problem: Gall stone pancreatitis.   ·  Plan:   - CT abd showing acute pancreatitis  - RUQ showing sludge, no stones  - lipase > 3000 11/24, normalized to 40 and now uptrended to 900 with resuming NGT feeding   - conservative care  - appreciate GI recs, no intervention planned   - surgery following   - on epimeric meropenem  - had a low grade fever despite Abx- f/u infectious workup.  Problem/Plan - 5:  ·  Problem: ERIC (acute kidney injury).   ·  Plan:   - likely arrest associated ATN and now worsened from hypovolemia   - stop diuretics and aim to keep net even to positive   - no indication for dialysis  - renal following.  Problem/Plan - 6:  ·  Problem: Nontraumatic subdural hematoma.   ·  Plan:   - small 3mm subdural hemorrhage stable on repeat CT  - okay to continue argatroban  - keep MAP < 75 mmHg and careful monitoring of PTT.    CXR improving and remains pulsatile on full ECMO support albeit with lower MAPs. Cr worsening today. Keep positive today with PRBC/IVF and will reassess renal function. Plan is for LHC and IABP pending improvement in ATN/ERIC. Pan-culture given leukocytosis and low grade temperature.     63 YO M with a history of CAD s/p 4v CABG ~2019 in [facility], DM2 (A1c 7.3%), and HTN who initially presented to OSH with abdominal pain and n/v and found to have pancreatitis with lipase > 3000 though also with elevated troponins. CT abdomen revealed acute pancreatitis and his initial LVEF was 40-45%. He developed MSOF with hypoxic respiratory failure requiring intubation and ERIC and went into hypoxic PEA arrest requiring ACLS and due to persistent hypoxia and hypotension on pressors after ROSC was cannulated to VA ECMO (LFV, RFA, no anterograde perfusion catheter) on 11/25. Notably CTH that day revealed small subdural hemorrhage.     His post arrest TTE on 11/26 showed a signficantly worse LVEF of 5-10% with an AV that was only minimally opening. Since then he has had improvement in his LV function (now ~35-40%) and improved pulsatility while tolerating progressive slow ECMO weans. ECMO turndown (note in chart 11/30) was reassuring for ability to decannulate to IABP/inotropes. He is awaiting LHC (IABP at same time) when renal function improves and afterwards will tentatively plan for ECMO decannulation. His ARDS is improving.     *** INCOMPLETE NOTE *** INCOMPLETE NOTE *** INCOMPLETE NOTE *** INCOMPLETE NOTE *** INCOMPLETE NOTE *** INCOMPLETE NOTE *** INCOMPLETE NOTE *** INCOMPLETE NOTE *** INCOMPLETE NOTE *** INCOMPLETE NOTE *** INCOMPLETE NOTE *** INCOMPLETE NOTE *** INCOMPLETE NOTE *** INCOMPLETE NOTE *** INCOMPLETE NOTE *** INCOMPLETE NOTE *** INCOMPLETE NOTE *** INCOMPLETE NOTE *** INCOMPLETE NOTE *** INCOMPLETE NOTE ***  RECS BELOW ARE NOT TO BE FOLLOWED UNTIL FINALIZED  argat gtt, atorva 40, dexmed gtt, insulin gtt, norepi gtt, prop gtt    Problem/Plan - 1:  ·  Problem: Cardiogenic shock.   ·  Plan:   - continue VA ECMO (LFV/RFA, no anterograde perfusion). Currently flowing at 3.6 L/min. monitor distal pulses closely  - ECMO wean performed on 11/30 and favorable, MDR discussion to continue ECMO until to be able to more safely perform LHC given ERIC (likely early next week). IABP to be placed at time of LHC for MCS weaning   - ok to remove PAC   - F/U ECHO done this morning   - cont argatroban for AC while on ECMO.  Problem/Plan - 2:  ·  Problem: Non-ST elevation MI (NSTEMI).   ·  Plan:  - troponin peaked at > 81222   - plan for LHC with IABP once recovers from ERIC, likely early next week   - continue statin  - would start ASA when okay with surgical team.  Problem/Plan - 3:  ·  Problem: Acute respiratory failure with hypoxia.   ·  Plan:   - CXR improved with more lung volumes after bronch and increased PEEP  - bronch showed erythematous airways with scant bleeding  - plan to keep net even today  - extubate when able  - continue checking right radial ABG.  - continue treatment for pancreatitis related ARDS.  Problem/Plan - 4:  ·  Problem: Gall stone pancreatitis.   ·  Plan:   - CT abd showing acute pancreatitis  - RUQ showing sludge, no stones  - lipase > 3000 11/24, normalized to 40 and now uptrended to 900 with resuming NGT feeding   - conservative care  - appreciate GI recs, no intervention planned   - surgery following   - on epimeric meropenem  - had a low grade fever despite Abx- f/u infectious workup.  Problem/Plan - 5:  ·  Problem: ERIC (acute kidney injury).   ·  Plan:   - likely arrest associated ATN and now worsened from hypovolemia   - stop diuretics and aim to keep net even to positive   - no indication for dialysis  - renal following.  Problem/Plan - 6:  ·  Problem: Nontraumatic subdural hematoma.   ·  Plan:   - small 3mm subdural hemorrhage stable on repeat CT  - okay to continue argatroban  - keep MAP < 75 mmHg and careful monitoring of PTT.    CXR improving and remains pulsatile on full ECMO support albeit with lower MAPs. Cr worsening today. Keep positive today with PRBC/IVF and will reassess renal function. Plan is for LHC and IABP pending improvement in ATN/ERIC. Pan-culture given leukocytosis and low grade temperature.     63 YO M with a history of CAD s/p 4v CABG ~2019 in [facility], DM2 (A1c 7.3%), and HTN who initially presented to OSH with abdominal pain and n/v and found to have pancreatitis with lipase > 3000 though also with elevated troponins. CT abdomen revealed acute pancreatitis and his initial LVEF was 40-45%. He developed MSOF with hypoxic respiratory failure requiring intubation and ERIC and went into hypoxic PEA arrest requiring ACLS and due to persistent hypoxia and hypotension on pressors after ROSC was cannulated to VA ECMO (LFV, RFA, no anterograde perfusion catheter) on 11/25. Notably CTH that day revealed small subdural hemorrhage.     His post arrest TTE on 11/26 showed a signficantly worse LVEF of 5-10% with an AV that was only minimally opening. Since then he has had improvement in his LV function (now ~35-40%) and improved pulsatility while tolerating progressive slow ECMO weans. ECMO turndown (note in chart 11/30) was reassuring for ability to decannulate to IABP/inotropes. He is awaiting LHC (IABP at same time) when renal function improves and afterwards will tentatively plan for ECMO decannulation. His ARDS is improving.     *** INCOMPLETE NOTE *** INCOMPLETE NOTE *** INCOMPLETE NOTE *** INCOMPLETE NOTE *** INCOMPLETE NOTE *** INCOMPLETE NOTE *** INCOMPLETE NOTE *** INCOMPLETE NOTE *** INCOMPLETE NOTE *** INCOMPLETE NOTE *** INCOMPLETE NOTE *** INCOMPLETE NOTE *** INCOMPLETE NOTE *** INCOMPLETE NOTE *** INCOMPLETE NOTE *** INCOMPLETE NOTE *** INCOMPLETE NOTE *** INCOMPLETE NOTE *** INCOMPLETE NOTE *** INCOMPLETE NOTE ***  RECS BELOW ARE NOT TO BE FOLLOWED UNTIL FINALIZED  argat gtt, atorva 40, dexmed gtt, insulin gtt, norepi gtt, prop gtt    Problem/Plan - 1:  ·  Problem: Cardiogenic shock.   ·  Plan:   - continue VA ECMO (LFV/RFA, no anterograde perfusion). Currently flowing at 3.6 L/min. monitor distal pulses closely  - ECMO wean performed on 11/30 and favorable, MDR discussion to continue ECMO until to be able to more safely perform LHC given ERIC (likely early next week). IABP to be placed at time of LHC for MCS weaning   - ok to remove PAC   - F/U ECHO done this morning   - cont argatroban for AC while on ECMO.  Problem/Plan - 2:  ·  Problem: Non-ST elevation MI (NSTEMI).   ·  Plan:  - troponin peaked at > 62626   - plan for LHC with IABP once recovers from ERIC, likely early next week   - continue statin  - would start ASA when okay with surgical team.  Problem/Plan - 3:  ·  Problem: Acute respiratory failure with hypoxia.   ·  Plan:   - CXR improved with more lung volumes after bronch and increased PEEP  - bronch showed erythematous airways with scant bleeding  - plan to keep net even today  - extubate when able  - continue checking right radial ABG.  - continue treatment for pancreatitis related ARDS.  Problem/Plan - 4:  ·  Problem: Gall stone pancreatitis.   ·  Plan:   - CT abd showing acute pancreatitis  - RUQ showing sludge, no stones  - lipase > 3000 11/24, normalized to 40 and now uptrended to 900 with resuming NGT feeding   - conservative care  - appreciate GI recs, no intervention planned   - surgery following   - on epimeric meropenem  - had a low grade fever despite Abx- f/u infectious workup.  Problem/Plan - 5:  ·  Problem: ERIC (acute kidney injury).   ·  Plan:   - likely arrest associated ATN and now worsened from hypovolemia   - stop diuretics and aim to keep net even to positive   - no indication for dialysis  - renal following.  Problem/Plan - 6:  ·  Problem: Nontraumatic subdural hematoma.   ·  Plan:   - small 3mm subdural hemorrhage stable on repeat CT  - okay to continue argatroban  - keep MAP < 75 mmHg and careful monitoring of PTT.    CXR improving and remains pulsatile on full ECMO support albeit with lower MAPs. Cr worsening today. Keep positive today with PRBC/IVF and will reassess renal function. Plan is for LHC and IABP pending improvement in ATN/ERIC. Pan-culture given leukocytosis and low grade temperature.     63 YO M with a history of CAD s/p 4v CABG ~2019 in [facility], DM2 (A1c 7.3%), and HTN who initially presented to OSH with abdominal pain and n/v and found to have pancreatitis with lipase > 3000 though also with elevated troponins. CT abdomen revealed acute pancreatitis and his initial LVEF was 40-45%. He developed MSOF with hypoxic respiratory failure requiring intubation and ERIC and went into hypoxic PEA arrest requiring ACLS and due to persistent hypoxia and hypotension on pressors after ROSC was cannulated to [facility] (LFV, RFA, no anterograde perfusion catheter) on 11/25. Notably CTH that day revealed small subdural hemorrhage.     His post arrest TTE on 11/26 showed a signficantly worse LVEF of 5-10% with an AV that was only minimally opening. Since then he has had improvement in his LV function (now ~35-40%) and improved pulsatility while tolerating progressive slow ECMO weans. ECMO turndown (note in chart 11/30) was reassuring for ability to decannulate to IABP/inotropes. He is awaiting LHC (IABP at same time) when renal function improves and afterwards will tentatively plan for ECMO decannulation. His ARDS is improving.  61 YO M with a history of CAD s/p 4v CABG ~2019 in [facility], DM2 (A1c 7.3%), and HTN who initially presented to OSH with abdominal pain and n/v and found to have pancreatitis with lipase > 3000 though also with elevated troponins. CT abdomen revealed acute pancreatitis and his initial LVEF was 40-45%. He developed MSOF with hypoxic respiratory failure requiring intubation and ERIC and went into hypoxic PEA arrest requiring ACLS and due to persistent hypoxia and hypotension on pressors after ROSC was cannulated to [facility] (LFV, RFA, no anterograde perfusion catheter) on 11/25. Notably CTH that day revealed small subdural hemorrhage.     His post arrest TTE on 11/26 showed a signficantly worse LVEF of 5-10% with an AV that was only minimally opening. Since then he has had improvement in his LV function (now ~35-40%) and improved pulsatility while tolerating progressive slow ECMO weans. ECMO turndown (note in chart 11/30) was reassuring for ability to decannulate to IABP/inotropes. He is awaiting LHC (IABP at same time) when renal function improves and afterwards will tentatively plan for ECMO decannulation. His ARDS is improving.  61 YO M with a history of CAD s/p 4v CABG ~2019 in [facility], DM2 (A1c 7.3%), and HTN who initially presented to OSH with abdominal pain and n/v and found to have pancreatitis with lipase > 3000 though also with elevated troponins. CT abdomen revealed acute pancreatitis and his initial LVEF was 40-45%. He developed MSOF with hypoxic respiratory failure requiring intubation and ERIC and went into hypoxic PEA arrest requiring ACLS and due to persistent hypoxia and hypotension on pressors after ROSC was cannulated to [facility] (LFV, RFA, no anterograde perfusion catheter) on 11/25. Notably CTH that day revealed small subdural hemorrhage.     His post arrest TTE on 11/26 showed a signficantly worse LVEF of 5-10% with an AV that was only minimally opening. Since then he has had improvement in his LV function (now ~35-40%) and improved pulsatility while tolerating progressive slow ECMO weans. ECMO turndown (note in chart 11/30) was reassuring for ability to decannulate to IABP/inotropes. He is awaiting LHC (IABP at same time) when renal function improves and afterwards will tentatively plan for ECMO decannulation. His ARDS is improving.  63 YO M with a history of CAD s/p 4v CABG ~2019 in [facility], DM2 (A1c 7.3%), and HTN who initially presented to OSH with abdominal pain and n/v and found to have pancreatitis with lipase > 3000 though also with elevated troponins. CT abdomen revealed acute pancreatitis and his initial LVEF was 40-45%. He developed MSOF with hypoxic respiratory failure requiring intubation and ERIC and went into hypoxic PEA arrest requiring ACLS and due to persistent hypoxia and hypotension on pressors after ROSC was cannulated to [facility] ECM[facility] (LFV, RFA, no anterograde perfusion catheter) on 11/25. Notably CTH that day revealed small subdural hemorrhage.     His post arrest TTE on 11/26 showed a signficantly worse LVEF of 5-10% with an AV that was only minimally opening. Since then he has had improvement in his LV function (now ~35-40%) and improved pulsatility while tolerating progressive slow ECMO weans. ECMO turndown (note in chart 11/30) was reassuring for ability to decannulate to IABP/inotropes. He is awaiting C (IABP at same time) with plan to do if renal fn improved but at this point likely will require dialysis so plan to do tomorrow. Afterwards will tentatively plan for ECMO decannulation. His ARDS is improving.  63 YO M with a history of CAD s/p 4v CABG ~2019 in [facility], DM2 (A1c 7.3%), and HTN who initially presented to OSH with abdominal pain and n/v and found to have pancreatitis with lipase > 3000 though also with elevated troponins. CT abdomen revealed acute pancreatitis and his initial LVEF was 40-45%. He developed MSOF with hypoxic respiratory failure requiring intubation and ERIC and went into hypoxic PEA arrest requiring ACLS and due to persistent hypoxia and hypotension on pressors after ROSC was cannulated to [facility] (LFV, RFA, no anterograde perfusion catheter) on 11/25. Notably CTH that day revealed small subdural hemorrhage.     His post arrest TTE on 11/26 showed a signficantly worse LVEF of 5-10% with an AV that was only minimally opening. Since then he has had improvement in his LV function (now ~35-40%) and improved pulsatility while tolerating progressive slow ECMO weans. ECMO turndown (note in chart 11/30) was reassuring for ability to decannulate to IABP/inotropes. He is awaiting C (IABP at same time) with plan to do if renal fn improved but at this point likely will require dialysis so plan to do tomorrow. Afterwards will tentatively plan for ECMO decannulation. His ARDS is improving.  61 YO M with a history of CAD s/p 4v CABG ~2019 in [facility], DM2 (A1c 7.3%), and HTN who initially presented to OSH with abdominal pain and n/v and found to have pancreatitis with lipase > 3000 though also with elevated troponins. CT abdomen revealed acute pancreatitis and his initial LVEF was 40-45%. He developed MSOF with hypoxic respiratory failure requiring intubation and ERIC and went into hypoxic PEA arrest requiring ACLS and due to persistent hypoxia and hypotension on pressors after ROSC was cannulated to [facility] (LFV, RFA, no anterograde perfusion catheter) on 11/25. Notably CTH that day revealed small subdural hemorrhage.     His post arrest TTE on 11/26 showed a signficantly worse LVEF of 5-10% with an AV that was only minimally opening. Since then he has had improvement in his LV function (now ~35-40%) and improved pulsatility while tolerating progressive slow ECMO weans. ECMO turndown (note in chart 11/30) was reassuring for ability to decannulate to IABP/inotropes. He is awaiting C (IABP at same time) with plan to do if renal fn improved but at this point likely will require dialysis so plan to do tomorrow. Afterwards will tentatively plan for ECMO decannulation. His ARDS is improving.

## 2022-12-05 NOTE — PROGRESS NOTE ADULT - SUBJECTIVE AND OBJECTIVE BOX
CARDIOLOGY CONSULT PROGRESS NOTE  EDUARDO RAEL  [id number]    INTERVAL EVENTS:  - tele:   -     ROS:  Negative, except as above.    MEDICATIONS  (STANDING):  argatroban Infusion 0.1 MICROgram(s)/kG/Min (0.56 mL/Hr) IV Continuous <Continuous>  artificial  tears Solution 1 Drop(s) Both EYES four times a day  atorvastatin 40 milliGRAM(s) Oral at bedtime  chlorhexidine 0.12% Liquid 15 milliLiter(s) Oral Mucosa every 12 hours  chlorhexidine 2% Cloths 1 Application(s) Topical <User Schedule>  dexMEDEtomidine Infusion 1.5 MICROgram(s)/kG/Hr (34.8 mL/Hr) IV Continuous <Continuous>  dextrose 50% Injectable 50 milliLiter(s) IV Push every 15 minutes  insulin regular Infusion 6 Unit(s)/Hr (6 mL/Hr) IV Continuous <Continuous>  norepinephrine Infusion 0.05 MICROgram(s)/kG/Min (8.71 mL/Hr) IV Continuous <Continuous>  pantoprazole  Injectable 40 milliGRAM(s) IV Push two times a day  polyethylene glycol 3350 17 Gram(s) Oral daily  propofol Infusion 17.94 MICROgram(s)/kG/Min (10 mL/Hr) IV Continuous <Continuous>  senna 2 Tablet(s) Oral at bedtime  sodium chloride 0.9%. 1000 milliLiter(s) (5 mL/Hr) IV Continuous <Continuous>    MEDICATIONS  (PRN):  HYDROmorphone  Injectable 0.5 milliGRAM(s) IV Push every 4 hours PRN Severe Pain (7 - 10)  sodium chloride 0.9% lock flush 10 milliLiter(s) IV Push every 1 hour PRN Pre/post blood products, medications, blood draw, and to maintain line patency    Allergies  No Known Allergies    P/E:  Adult Advanced Hemodynamics Last 24 Hrs  CVP(mm Hg): 8 (05 Dec 2022 07:45) (4 - 13)    Vital Signs Last 24 Hrs  T(C): 36.9 (05 Dec 2022 04:00), Max: 37.3 (04 Dec 2022 16:00)  T(F): 98.4 (05 Dec 2022 04:00), Max: 99.1 (04 Dec 2022 16:00)  HR: 87 (05 Dec 2022 07:45) (72 - 94)  RR: 16 (05 Dec 2022 07:45) (13 - 33)  SpO2: 100% (05 Dec 2022 07:45) (94% - 100%)    Patient On (Oxygen Delivery Method): ventilator  O2 Concentration (%): 40    Daily Weight in k.5 (05 Dec 2022 00:00)    I&O's Detail  04 Dec 2022 07:01  -  05 Dec 2022 07:00  IN:    Argatroban: 14.4 mL    Dexmedetomidine: 835.2 mL    Enteral Tube Flush: 120 mL    Free Water: 500 mL    Insulin: 85 mL    Norepinephrine: 24.7 mL    Propofol: 201.6 mL    sodium chloride 0.9%: 120 mL    Vital1.5: 480 mL  Total IN: 2380.9 mL  OUT:    Indwelling Catheter - Urethral (mL): 2615 mL    NiCARdipine: 0 mL    Sodium Bicarbonate: 0 mL  Total OUT: 2615 mL  Total NET: -234.1 mL    I&O's Summary  04 Dec 2022 07:01  -  05 Dec 2022 07:00  --------------------------------------------------------  IN: 2380.9 mL / OUT: 2615 mL / NET: -234.1 mL    RASS -3, intubated   Neck supple, RIJ in place  Heart RRR s1s2 no m   Lungs clear, mechanical bs  Ext warm trace dependent edema    RELEVANT RECENT LABS/IMAGING/STUDIES:                        8.5    12.82 )-----------( 128      ( 05 Dec 2022 00:56 )             25.2     12-    151<H>  |  113<H>  |  101<H>  ----------------------------<  130<H>  4.2   |  21<L>  |  4.19<H>    Ca    8.4      05 Dec 2022 00:50  Phos  5.9     12-05  Mg     2.5     12-    TPro  5.7<L>  /  Alb  3.0<L>  /  TBili  1.0  /  DBili  x   /  AST  55<H>  /  ALT  9<L>  /  AlkPhos  224<H>  12-05    LIVER FUNCTIONS - ( 05 Dec 2022 00:50 )  Alb: 3.0 g/dL / Pro: 5.7 g/dL / ALK PHOS: 224 U/L / ALT: 9 U/L / AST: 55 U/L / GGT: x           PT/INR - ( 05 Dec 2022 00:56 )   PT: 17.4 sec;   INR: 1.51 ratio       PTT - ( 05 Dec 2022 00:56 )  PTT:48.4 sec    ABG - ( 05 Dec 2022 06:00 )  pH, Arterial: 7.32  pH, Blood: x     /  pCO2: 43    /  pO2: 414   / HCO3: 22    / Base Excess: -3.7  /  SaO2: 99.0      Culture - Blood (collected 03 Dec 2022 17:05)  Source: .Blood Blood  Preliminary Report (04 Dec 2022 20:02):    No growth to date.    Culture - Sputum (collected 03 Dec 2022 15:29)  Source: ET Tube ET Tube  Gram Stain (04 Dec 2022 05:13):    Rare polymorphonuclear leukocytes per low power field    No Squamous epithelial cells per low power field    No organisms seen per oil power field  Preliminary Report (04 Dec 2022 19:11):    Normal Respiratory Christy present    Culture - Blood (collected 03 Dec 2022 14:18)  Source: .Blood Blood  Preliminary Report (04 Dec 2022 19:02):    No growth to date. CARDIOLOGY CONSULT PROGRESS NOTE  EDUARDO REAL  MRN-[id number]    INTERVAL EVENTS:  - tele:   -     ROS:  Negative, except as above.    MEDICATIONS  (STANDING):  argatroban Infusion 0.1 MICROgram(s)/kG/Min (0.56 mL/Hr) IV Continuous <Continuous>  artificial  tears Solution 1 Drop(s) Both EYES four times a day  atorvastatin 40 milliGRAM(s) Oral at bedtime  chlorhexidine 0.12% Liquid 15 milliLiter(s) Oral Mucosa every 12 hours  chlorhexidine 2% Cloths 1 Application(s) Topical <User Schedule>  dexMEDEtomidine Infusion 1.5 MICROgram(s)/kG/Hr (34.8 mL/Hr) IV Continuous <Continuous>  dextrose 50% Injectable 50 milliLiter(s) IV Push every 15 minutes  insulin regular Infusion 6 Unit(s)/Hr (6 mL/Hr) IV Continuous <Continuous>  norepinephrine Infusion 0.05 MICROgram(s)/kG/Min (8.71 mL/Hr) IV Continuous <Continuous>  pantoprazole  Injectable 40 milliGRAM(s) IV Push two times a day  polyethylene glycol 3350 17 Gram(s) Oral daily  propofol Infusion 17.94 MICROgram(s)/kG/Min (10 mL/Hr) IV Continuous <Continuous>  senna 2 Tablet(s) Oral at bedtime  sodium chloride 0.9%. 1000 milliLiter(s) (5 mL/Hr) IV Continuous <Continuous>    MEDICATIONS  (PRN):  HYDROmorphone  Injectable 0.5 milliGRAM(s) IV Push every 4 hours PRN Severe Pain (7 - 10)  sodium chloride 0.9% lock flush 10 milliLiter(s) IV Push every 1 hour PRN Pre/post blood products, medications, blood draw, and to maintain line patency    Allergies  No Known Allergies    P/E:  Adult Advanced Hemodynamics Last 24 Hrs  CVP(mm Hg): 8 (05 Dec 2022 07:45) (4 - 13)    Vital Signs Last 24 Hrs  T(C): 36.9 (05 Dec 2022 04:00), Max: 37.3 (04 Dec 2022 16:00)  T(F): 98.4 (05 Dec 2022 04:00), Max: 99.1 (04 Dec 2022 16:00)  HR: 87 (05 Dec 2022 07:45) (72 - 94)  RR: 16 (05 Dec 2022 07:45) (13 - 33)  SpO2: 100% (05 Dec 2022 07:45) (94% - 100%)    Patient On (Oxygen Delivery Method): ventilator  O2 Concentration (%): 40    Daily Weight in k.5 (05 Dec 2022 00:00)    I&O's Detail  04 Dec 2022 07:01  -  05 Dec 2022 07:00  IN:    Argatroban: 14.4 mL    Dexmedetomidine: 835.2 mL    Enteral Tube Flush: 120 mL    Free Water: 500 mL    Insulin: 85 mL    Norepinephrine: 24.7 mL    Propofol: 201.6 mL    sodium chloride 0.9%: 120 mL    Vital1.5: 480 mL  Total IN: 2380.9 mL  OUT:    Indwelling Catheter - Urethral (mL): 2615 mL    NiCARdipine: 0 mL    Sodium Bicarbonate: 0 mL  Total OUT: 2615 mL  Total NET: -234.1 mL    I&O's Summary  04 Dec 2022 07:01  -  05 Dec 2022 07:00  --------------------------------------------------------  IN: 2380.9 mL / OUT: 2615 mL / NET: -234.1 mL    RASS -3, intubated   Neck supple, RIJ in place  Heart RRR s1s2 no m   Lungs clear, mechanical bs  Ext warm trace dependent edema    RELEVANT RECENT LABS/IMAGING/STUDIES:                        8.5    12.82 )-----------( 128      ( 05 Dec 2022 00:56 )             25.2     12-    151<H>  |  113<H>  |  101<H>  ----------------------------<  130<H>  4.2   |  21<L>  |  4.19<H>    Ca    8.4      05 Dec 2022 00:50  Phos  5.9     12-05  Mg     2.5     12-    TPro  5.7<L>  /  Alb  3.0<L>  /  TBili  1.0  /  DBili  x   /  AST  55<H>  /  ALT  9<L>  /  AlkPhos  224<H>  12-05    LIVER FUNCTIONS - ( 05 Dec 2022 00:50 )  Alb: 3.0 g/dL / Pro: 5.7 g/dL / ALK PHOS: 224 U/L / ALT: 9 U/L / AST: 55 U/L / GGT: x           PT/INR - ( 05 Dec 2022 00:56 )   PT: 17.4 sec;   INR: 1.51 ratio       PTT - ( 05 Dec 2022 00:56 )  PTT:48.4 sec    ABG - ( 05 Dec 2022 06:00 )  pH, Arterial: 7.32  pH, Blood: x     /  pCO2: 43    /  pO2: 414   / HCO3: 22    / Base Excess: -3.7  /  SaO2: 99.0      Culture - Blood (collected 03 Dec 2022 17:05)  Source: .Blood Blood  Preliminary Report (04 Dec 2022 20:02):    No growth to date.    Culture - Sputum (collected 03 Dec 2022 15:29)  Source: ET Tube ET Tube  Gram Stain (04 Dec 2022 05:13):    Rare polymorphonuclear leukocytes per low power field    No Squamous epithelial cells per low power field    No organisms seen per oil power field  Preliminary Report (04 Dec 2022 19:11):    Normal Respiratory Christy present    Culture - Blood (collected 03 Dec 2022 14:18)  Source: .Blood Blood  Preliminary Report (04 Dec 2022 19:02):    No growth to date. CARDIOLOGY CONSULT PROGRESS NOTE  EDUARDO REAL  MRN-[id number]    INTERVAL EVENTS:  - tele:   -     ROS:  Negative, except as above.    MEDICATIONS  (STANDING):  argatroban Infusion 0.1 MICROgram(s)/kG/Min (0.56 mL/Hr) IV Continuous <Continuous>  artificial  tears Solution 1 Drop(s) Both EYES four times a day  atorvastatin 40 milliGRAM(s) Oral at bedtime  chlorhexidine 0.12% Liquid 15 milliLiter(s) Oral Mucosa every 12 hours  chlorhexidine 2% Cloths 1 Application(s) Topical <User Schedule>  dexMEDEtomidine Infusion 1.5 MICROgram(s)/kG/Hr (34.8 mL/Hr) IV Continuous <Continuous>  dextrose 50% Injectable 50 milliLiter(s) IV Push every 15 minutes  insulin regular Infusion 6 Unit(s)/Hr (6 mL/Hr) IV Continuous <Continuous>  norepinephrine Infusion 0.05 MICROgram(s)/kG/Min (8.71 mL/Hr) IV Continuous <Continuous>  pantoprazole  Injectable 40 milliGRAM(s) IV Push two times a day  polyethylene glycol 3350 17 Gram(s) Oral daily  propofol Infusion 17.94 MICROgram(s)/kG/Min (10 mL/Hr) IV Continuous <Continuous>  senna 2 Tablet(s) Oral at bedtime  sodium chloride 0.9%. 1000 milliLiter(s) (5 mL/Hr) IV Continuous <Continuous>    MEDICATIONS  (PRN):  HYDROmorphone  Injectable 0.5 milliGRAM(s) IV Push every 4 hours PRN Severe Pain (7 - 10)  sodium chloride 0.9% lock flush 10 milliLiter(s) IV Push every 1 hour PRN Pre/post blood products, medications, blood draw, and to maintain line patency    Allergies  No Known Allergies    P/E:  Adult Advanced Hemodynamics Last 24 Hrs  CVP(mm Hg): 8 (05 Dec 2022 07:45) (4 - 13)    Vital Signs Last 24 Hrs  T(C): 36.9 (05 Dec 2022 04:00), Max: 37.3 (04 Dec 2022 16:00)  T(F): 98.4 (05 Dec 2022 04:00), Max: 99.1 (04 Dec 2022 16:00)  HR: 87 (05 Dec 2022 07:45) (72 - 94)  RR: 16 (05 Dec 2022 07:45) (13 - 33)  SpO2: 100% (05 Dec 2022 07:45) (94% - 100%)    Patient On (Oxygen Delivery Method): ventilator  O2 Concentration (%): 40    Daily Weight in k.5 (05 Dec 2022 00:00)    I&O's Detail  04 Dec 2022 07:01  -  05 Dec 2022 07:00  IN:    Argatroban: 14.4 mL    Dexmedetomidine: 835.2 mL    Enteral Tube Flush: 120 mL    Free Water: 500 mL    Insulin: 85 mL    Norepinephrine: 24.7 mL    Propofol: 201.6 mL    sodium chloride 0.9%: 120 mL    Vital1.5: 480 mL  Total IN: 2380.9 mL  OUT:    Indwelling Catheter - Urethral (mL): 2615 mL    NiCARdipine: 0 mL    Sodium Bicarbonate: 0 mL  Total OUT: 2615 mL  Total NET: -234.1 mL    I&O's Summary  04 Dec 2022 07:01  -  05 Dec 2022 07:00  --------------------------------------------------------  IN: 2380.9 mL / OUT: 2615 mL / NET: -234.1 mL    RASS -3, intubated   Neck supple, RIJ in place  Heart RRR s1s2 no m   Lungs clear, mechanical bs  Ext warm trace dependent edema    RELEVANT RECENT LABS/IMAGING/STUDIES:                        8.5    12.82 )-----------( 128      ( 05 Dec 2022 00:56 )             25.2     12-    151<H>  |  113<H>  |  101<H>  ----------------------------<  130<H>  4.2   |  21<L>  |  4.19<H>    Ca    8.4      05 Dec 2022 00:50  Phos  5.9     12-05  Mg     2.5     12-    TPro  5.7<L>  /  Alb  3.0<L>  /  TBili  1.0  /  DBili  x   /  AST  55<H>  /  ALT  9<L>  /  AlkPhos  224<H>  12-05    LIVER FUNCTIONS - ( 05 Dec 2022 00:50 )  Alb: 3.0 g/dL / Pro: 5.7 g/dL / ALK PHOS: 224 U/L / ALT: 9 U/L / AST: 55 U/L / GGT: x           PT/INR - ( 05 Dec 2022 00:56 )   PT: 17.4 sec;   INR: 1.51 ratio       PTT - ( 05 Dec 2022 00:56 )  PTT:48.4 sec    ABG - ( 05 Dec 2022 06:00 )  pH, Arterial: 7.32  pH, Blood: x     /  pCO2: 43    /  pO2: 414   / HCO3: 22    / Base Excess: -3.7  /  SaO2: 99.0      Culture - Blood (collected 03 Dec 2022 17:05)  Source: .Blood Blood  Preliminary Report (04 Dec 2022 20:02):    No growth to date.    Culture - Sputum (collected 03 Dec 2022 15:29)  Source: ET Tube ET Tube  Gram Stain (04 Dec 2022 05:13):    Rare polymorphonuclear leukocytes per low power field    No Squamous epithelial cells per low power field    No organisms seen per oil power field  Preliminary Report (04 Dec 2022 19:11):    Normal Respiratory Christy present    Culture - Blood (collected 03 Dec 2022 14:18)  Source: .Blood Blood  Preliminary Report (04 Dec 2022 19:02):    No growth to date. CARDIOLOGY CONSULT PROGRESS NOTE  EDUARDO REAL  MRN-[id number]    INTERVAL EVENTS:  - tele: NSR 70-100s, no events  - intubated/sedated    ROS:  Negative, except as above.    MEDICATIONS  (STANDING):  argatroban Infusion 0.1 MICROgram(s)/kG/Min (0.56 mL/Hr) IV Continuous <Continuous>  artificial  tears Solution 1 Drop(s) Both EYES four times a day  atorvastatin 40 milliGRAM(s) Oral at bedtime  chlorhexidine 0.12% Liquid 15 milliLiter(s) Oral Mucosa every 12 hours  chlorhexidine 2% Cloths 1 Application(s) Topical <User Schedule>  dexMEDEtomidine Infusion 1.5 MICROgram(s)/kG/Hr (34.8 mL/Hr) IV Continuous <Continuous>  dextrose 50% Injectable 50 milliLiter(s) IV Push every 15 minutes  insulin regular Infusion 6 Unit(s)/Hr (6 mL/Hr) IV Continuous <Continuous>  norepinephrine Infusion 0.05 MICROgram(s)/kG/Min (8.71 mL/Hr) IV Continuous <Continuous>  pantoprazole  Injectable 40 milliGRAM(s) IV Push two times a day  polyethylene glycol 3350 17 Gram(s) Oral daily  propofol Infusion 17.94 MICROgram(s)/kG/Min (10 mL/Hr) IV Continuous <Continuous>  senna 2 Tablet(s) Oral at bedtime  sodium chloride 0.9%. 1000 milliLiter(s) (5 mL/Hr) IV Continuous <Continuous>    MEDICATIONS  (PRN):  HYDROmorphone  Injectable 0.5 milliGRAM(s) IV Push every 4 hours PRN Severe Pain (7 - 10)  sodium chloride 0.9% lock flush 10 milliLiter(s) IV Push every 1 hour PRN Pre/post blood products, medications, blood draw, and to maintain line patency    Allergies  No Known Allergies    P/E:  Adult Advanced Hemodynamics Last 24 Hrs  CVP(mm Hg): 8 (05 Dec 2022 07:45) (4 - 13)    Vital Signs Last 24 Hrs  T(C): 36.9 (05 Dec 2022 04:00), Max: 37.3 (04 Dec 2022 16:00)  T(F): 98.4 (05 Dec 2022 04:00), Max: 99.1 (04 Dec 2022 16:00)  HR: 87 (05 Dec 2022 07:45) (72 - 94)  RR: 16 (05 Dec 2022 07:45) (13 - 33)  SpO2: 100% (05 Dec 2022 07:45) (94% - 100%)    Patient On (Oxygen Delivery Method): ventilator  O2 Concentration (%): 40    Daily Weight in k.5 (05 Dec 2022 00:00)    I&O's Detail  04 Dec 2022 07:01  -  05 Dec 2022 07:00  IN:    Argatroban: 14.4 mL    Dexmedetomidine: 835.2 mL    Enteral Tube Flush: 120 mL    Free Water: 500 mL    Insulin: 85 mL    Norepinephrine: 24.7 mL    Propofol: 201.6 mL    sodium chloride 0.9%: 120 mL    Vital1.5: 480 mL  Total IN: 2380.9 mL  OUT:    Indwelling Catheter - Urethral (mL): 2615 mL    NiCARdipine: 0 mL    Sodium Bicarbonate: 0 mL  Total OUT: 2615 mL  Total NET: -234.1 mL    I&O's Summary  04 Dec 2022 07:01  -  05 Dec 2022 07:00  --------------------------------------------------------  IN: 2380.9 mL / OUT: 2615 mL / NET: -234.1 mL    - gen: intubated/sedated, laying in hospital bed, rousable and follows commands  - HEENT: ETT in place, MMM  - heart: RRR, systolic murmur, S1/S2  - lungs: mechanical BS  - abd: distended, soft, NT, hypoactive BS  - ext: WWP, PPP, 2+ MESERET, ECMO cannulas in b/l groins w/ intact pulses    RELEVANT RECENT LABS/IMAGING/STUDIES:                        8.5    12.82 )-----------( 128      ( 05 Dec 2022 00:56 )             25.2     12-05    151<H>  |  113<H>  |  101<H>  ----------------------------<  130<H>  4.2   |  21<L>  |  4.19<H>    Ca    8.4      05 Dec 2022 00:50  Phos  5.9     12-  Mg     2.5     12-05    TPro  5.7<L>  /  Alb  3.0<L>  /  TBili  1.0  /  DBili  x   /  AST  55<H>  /  ALT  9<L>  /  AlkPhos  224<H>  12-05    LIVER FUNCTIONS - ( 05 Dec 2022 00:50 )  Alb: 3.0 g/dL / Pro: 5.7 g/dL / ALK PHOS: 224 U/L / ALT: 9 U/L / AST: 55 U/L / GGT: x           PT/INR - ( 05 Dec 2022 00:56 )   PT: 17.4 sec;   INR: 1.51 ratio       PTT - ( 05 Dec 2022 00:56 )  PTT:48.4 sec    ABG - ( 05 Dec 2022 06:00 )  pH, Arterial: 7.32  pH, Blood: x     /  pCO2: 43    /  pO2: 414   / HCO3: 22    / Base Excess: -3.7  /  SaO2: 99.0      Culture - Blood (collected 03 Dec 2022 17:05)  Source: .Blood Blood  Preliminary Report (04 Dec 2022 20:02):    No growth to date.    Culture - Sputum (collected 03 Dec 2022 15:29)  Source: ET Tube ET Tube  Gram Stain (04 Dec 2022 05:13):    Rare polymorphonuclear leukocytes per low power field    No Squamous epithelial cells per low power field    No organisms seen per oil power field  Preliminary Report (04 Dec 2022 19:11):    Normal Respiratory Christy present    Culture - Blood (collected 03 Dec 2022 14:18)  Source: .Blood Blood  Preliminary Report (04 Dec 2022 19:02):    No growth to date. CARDIOLOGY CONSULT PROGRESS NOTE  EDUARDO REAL  [id number]    INTERVAL EVENTS:  - tele: NSR 70-100s, no events  - intubated/sedated    ROS:  Negative, except as above.    MEDICATIONS  (STANDING):  argatroban Infusion 0.1 MICROgram(s)/kG/Min (0.56 mL/Hr) IV Continuous <Continuous>  artificial  tears Solution 1 Drop(s) Both EYES four times a day  atorvastatin 40 milliGRAM(s) Oral at bedtime  chlorhexidine 0.12% Liquid 15 milliLiter(s) Oral Mucosa every 12 hours  chlorhexidine 2% Cloths 1 Application(s) Topical <User Schedule>  dexMEDEtomidine Infusion 1.5 MICROgram(s)/kG/Hr (34.8 mL/Hr) IV Continuous <Continuous>  dextrose 50% Injectable 50 milliLiter(s) IV Push every 15 minutes  insulin regular Infusion 6 Unit(s)/Hr (6 mL/Hr) IV Continuous <Continuous>  norepinephrine Infusion 0.05 MICROgram(s)/kG/Min (8.71 mL/Hr) IV Continuous <Continuous>  pantoprazole  Injectable 40 milliGRAM(s) IV Push two times a day  polyethylene glycol 3350 17 Gram(s) Oral daily  propofol Infusion 17.94 MICROgram(s)/kG/Min (10 mL/Hr) IV Continuous <Continuous>  senna 2 Tablet(s) Oral at bedtime  sodium chloride 0.9%. 1000 milliLiter(s) (5 mL/Hr) IV Continuous <Continuous>    MEDICATIONS  (PRN):  HYDROmorphone  Injectable 0.5 milliGRAM(s) IV Push every 4 hours PRN Severe Pain (7 - 10)  sodium chloride 0.9% lock flush 10 milliLiter(s) IV Push every 1 hour PRN Pre/post blood products, medications, blood draw, and to maintain line patency    Allergies  No Known Allergies    P/E:  Adult Advanced Hemodynamics Last 24 Hrs  CVP(mm Hg): 8 (05 Dec 2022 07:45) (4 - 13)    Vital Signs Last 24 Hrs  T(C): 36.9 (05 Dec 2022 04:00), Max: 37.3 (04 Dec 2022 16:00)  T(F): 98.4 (05 Dec 2022 04:00), Max: 99.1 (04 Dec 2022 16:00)  HR: 87 (05 Dec 2022 07:45) (72 - 94)  RR: 16 (05 Dec 2022 07:45) (13 - 33)  SpO2: 100% (05 Dec 2022 07:45) (94% - 100%)    Patient On (Oxygen Delivery Method): ventilator  O2 Concentration (%): 40    Daily Weight in k.5 (05 Dec 2022 00:00)    I&O's Detail  04 Dec 2022 07:01  -  05 Dec 2022 07:00  IN:    Argatroban: 14.4 mL    Dexmedetomidine: 835.2 mL    Enteral Tube Flush: 120 mL    Free Water: 500 mL    Insulin: 85 mL    Norepinephrine: 24.7 mL    Propofol: 201.6 mL    sodium chloride 0.9%: 120 mL    Vital1.5: 480 mL  Total IN: 2380.9 mL  OUT:    Indwelling Catheter - Urethral (mL): 2615 mL    NiCARdipine: 0 mL    Sodium Bicarbonate: 0 mL  Total OUT: 2615 mL  Total NET: -234.1 mL    I&O's Summary  04 Dec 2022 07:01  -  05 Dec 2022 07:00  --------------------------------------------------------  IN: 2380.9 mL / OUT: 2615 mL / NET: -234.1 mL    - gen: intubated/sedated, laying in hospital bed, rousable and follows commands  - HEENT: ETT in place, MMM  - heart: RRR, systolic murmur, S1/S2  - lungs: mechanical BS  - abd: distended, soft, NT, hypoactive BS  - ext: WWP, PPP, 2+ MESERET, ECMO cannulas in b/l groins w/ intact pulses    RELEVANT RECENT LABS/IMAGING/STUDIES:                        8.5    12.82 )-----------( 128      ( 05 Dec 2022 00:56 )             25.2     12-05    151<H>  |  113<H>  |  101<H>  ----------------------------<  130<H>  4.2   |  21<L>  |  4.19<H>    Ca    8.4      05 Dec 2022 00:50  Phos  5.9     12-  Mg     2.5     12-05    TPro  5.7<L>  /  Alb  3.0<L>  /  TBili  1.0  /  DBili  x   /  AST  55<H>  /  ALT  9<L>  /  AlkPhos  224<H>  12-05    LIVER FUNCTIONS - ( 05 Dec 2022 00:50 )  Alb: 3.0 g/dL / Pro: 5.7 g/dL / ALK PHOS: 224 U/L / ALT: 9 U/L / AST: 55 U/L / GGT: x           PT/INR - ( 05 Dec 2022 00:56 )   PT: 17.4 sec;   INR: 1.51 ratio       PTT - ( 05 Dec 2022 00:56 )  PTT:48.4 sec    ABG - ( 05 Dec 2022 06:00 )  pH, Arterial: 7.32  pH, Blood: x     /  pCO2: 43    /  pO2: 414   / HCO3: 22    / Base Excess: -3.7  /  SaO2: 99.0      Culture - Blood (collected 03 Dec 2022 17:05)  Source: .Blood Blood  Preliminary Report (04 Dec 2022 20:02):    No growth to date.    Culture - Sputum (collected 03 Dec 2022 15:29)  Source: ET Tube ET Tube  Gram Stain (04 Dec 2022 05:13):    Rare polymorphonuclear leukocytes per low power field    No Squamous epithelial cells per low power field    No organisms seen per oil power field  Preliminary Report (04 Dec 2022 19:11):    Normal Respiratory Christy present    Culture - Blood (collected 03 Dec 2022 14:18)  Source: .Blood Blood  Preliminary Report (04 Dec 2022 19:02):    No growth to date. CARDIOLOGY CONSULT PROGRESS NOTE  EDUARDO REAL  [id number]    INTERVAL EVENTS:  - tele: NSR 70-100s, no events  - intubated/sedated    ROS:  Negative, except as above.    MEDICATIONS  (STANDING):  argatroban Infusion 0.1 MICROgram(s)/kG/Min (0.56 mL/Hr) IV Continuous <Continuous>  artificial  tears Solution 1 Drop(s) Both EYES four times a day  atorvastatin 40 milliGRAM(s) Oral at bedtime  chlorhexidine 0.12% Liquid 15 milliLiter(s) Oral Mucosa every 12 hours  chlorhexidine 2% Cloths 1 Application(s) Topical <User Schedule>  dexMEDEtomidine Infusion 1.5 MICROgram(s)/kG/Hr (34.8 mL/Hr) IV Continuous <Continuous>  dextrose 50% Injectable 50 milliLiter(s) IV Push every 15 minutes  insulin regular Infusion 6 Unit(s)/Hr (6 mL/Hr) IV Continuous <Continuous>  norepinephrine Infusion 0.05 MICROgram(s)/kG/Min (8.71 mL/Hr) IV Continuous <Continuous>  pantoprazole  Injectable 40 milliGRAM(s) IV Push two times a day  polyethylene glycol 3350 17 Gram(s) Oral daily  propofol Infusion 17.94 MICROgram(s)/kG/Min (10 mL/Hr) IV Continuous <Continuous>  senna 2 Tablet(s) Oral at bedtime  sodium chloride 0.9%. 1000 milliLiter(s) (5 mL/Hr) IV Continuous <Continuous>    MEDICATIONS  (PRN):  HYDROmorphone  Injectable 0.5 milliGRAM(s) IV Push every 4 hours PRN Severe Pain (7 - 10)  sodium chloride 0.9% lock flush 10 milliLiter(s) IV Push every 1 hour PRN Pre/post blood products, medications, blood draw, and to maintain line patency    Allergies  No Known Allergies    P/E:  Adult Advanced Hemodynamics Last 24 Hrs  CVP(mm Hg): 8 (05 Dec 2022 07:45) (4 - 13)    Vital Signs Last 24 Hrs  T(C): 36.9 (05 Dec 2022 04:00), Max: 37.3 (04 Dec 2022 16:00)  T(F): 98.4 (05 Dec 2022 04:00), Max: 99.1 (04 Dec 2022 16:00)  HR: 87 (05 Dec 2022 07:45) (72 - 94)  RR: 16 (05 Dec 2022 07:45) (13 - 33)  SpO2: 100% (05 Dec 2022 07:45) (94% - 100%)    Patient On (Oxygen Delivery Method): ventilator  O2 Concentration (%): 40    Daily Weight in k.5 (05 Dec 2022 00:00)    I&O's Detail  04 Dec 2022 07:01  -  05 Dec 2022 07:00  IN:    Argatroban: 14.4 mL    Dexmedetomidine: 835.2 mL    Enteral Tube Flush: 120 mL    Free Water: 500 mL    Insulin: 85 mL    Norepinephrine: 24.7 mL    Propofol: 201.6 mL    sodium chloride 0.9%: 120 mL    Vital1.5: 480 mL  Total IN: 2380.9 mL  OUT:    Indwelling Catheter - Urethral (mL): 2615 mL    NiCARdipine: 0 mL    Sodium Bicarbonate: 0 mL  Total OUT: 2615 mL  Total NET: -234.1 mL    I&O's Summary  04 Dec 2022 07:01  -  05 Dec 2022 07:00  --------------------------------------------------------  IN: 2380.9 mL / OUT: 2615 mL / NET: -234.1 mL    - gen: intubated/sedated, laying in hospital bed, rousable and follows commands  - HEENT: ETT in place, MMM  - heart: RRR, systolic murmur, S1/S2  - lungs: mechanical BS  - abd: distended, soft, NT, hypoactive BS  - ext: WWP, PPP, 2+ MESERET, ECMO cannulas in b/l groins w/ intact pulses    RELEVANT RECENT LABS/IMAGING/STUDIES:                        8.5    12.82 )-----------( 128      ( 05 Dec 2022 00:56 )             25.2     12-05    151<H>  |  113<H>  |  101<H>  ----------------------------<  130<H>  4.2   |  21<L>  |  4.19<H>    Ca    8.4      05 Dec 2022 00:50  Phos  5.9     12-  Mg     2.5     12-05    TPro  5.7<L>  /  Alb  3.0<L>  /  TBili  1.0  /  DBili  x   /  AST  55<H>  /  ALT  9<L>  /  AlkPhos  224<H>  12-05    LIVER FUNCTIONS - ( 05 Dec 2022 00:50 )  Alb: 3.0 g/dL / Pro: 5.7 g/dL / ALK PHOS: 224 U/L / ALT: 9 U/L / AST: 55 U/L / GGT: x           PT/INR - ( 05 Dec 2022 00:56 )   PT: 17.4 sec;   INR: 1.51 ratio       PTT - ( 05 Dec 2022 00:56 )  PTT:48.4 sec    ABG - ( 05 Dec 2022 06:00 )  pH, Arterial: 7.32  pH, Blood: x     /  pCO2: 43    /  pO2: 414   / HCO3: 22    / Base Excess: -3.7  /  SaO2: 99.0      Culture - Blood (collected 03 Dec 2022 17:05)  Source: .Blood Blood  Preliminary Report (04 Dec 2022 20:02):    No growth to date.    Culture - Sputum (collected 03 Dec 2022 15:29)  Source: ET Tube ET Tube  Gram Stain (04 Dec 2022 05:13):    Rare polymorphonuclear leukocytes per low power field    No Squamous epithelial cells per low power field    No organisms seen per oil power field  Preliminary Report (04 Dec 2022 19:11):    Normal Respiratory Christy present    Culture - Blood (collected 03 Dec 2022 14:18)  Source: .Blood Blood  Preliminary Report (04 Dec 2022 19:02):    No growth to date.

## 2022-12-05 NOTE — PROGRESS NOTE ADULT - SUBJECTIVE AND OBJECTIVE BOX
Patient seen and examined at the bedside.    Remained critically ill on continuous ICU monitoring.    OBJECTIVE:  Vital Signs Last 24 Hrs  T(C): 36.8 (05 Dec 2022 08:00), Max: 37.3 (04 Dec 2022 20:00)  T(F): 98.2 (05 Dec 2022 08:00), Max: 99.1 (04 Dec 2022 20:00)  HR: 92 (05 Dec 2022 19:45) (77 - 100)  BP: --  BP(mean): --  RR: 14 (05 Dec 2022 19:45) (14 - 33)  SpO2: 100% (05 Dec 2022 19:45) (94% - 100%)    Parameters below as of 05 Dec 2022 12:00  Patient On (Oxygen Delivery Method): ventilator    O2 Concentration (%): 40      Physical Exam:   General: Intubated, multiple lines gtt  Neurology: Sedated, off sedation follows simple commands, COBB x4  Eyes: bilateral pupils equal and reactive   ENT/Neck: +ETT midline, Neck supple, trachea midline, No JVD   Respiratory: rhonchi bilaterally   CV: S1S2, no murmurs        [x] Sinus rhythm  Abdominal: Softly distended,+BS   Extremities: 1+ pedal edema noted, + peripheral pulses palpable, warm  Skin: No Rashes, Hematoma, Ecchymosis                              ============================I/O===========================   I&O's Detail    04 Dec 2022 07:01  -  05 Dec 2022 07:00  --------------------------------------------------------  IN:    Argatroban: 14.4 mL    Dexmedetomidine: 835.2 mL    Enteral Tube Flush: 120 mL    Free Water: 500 mL    Insulin: 85 mL    Norepinephrine: 24.7 mL    Propofol: 201.6 mL    sodium chloride 0.9%: 120 mL    Vital1.5: 480 mL  Total IN: 2380.9 mL    OUT:    Indwelling Catheter - Urethral (mL): 2615 mL    NiCARdipine: 0 mL    Sodium Bicarbonate: 0 mL  Total OUT: 2615 mL    Total NET: -234.1 mL      05 Dec 2022 07:01  -  05 Dec 2022 19:48  --------------------------------------------------------  IN:    Argatroban: 4.8 mL    Argatroban: 1.6 mL    Dexmedetomidine: 417.6 mL    Insulin: 30 mL    Norepinephrine: 10 mL    Propofol: 19.6 mL    sodium chloride 0.9%: 60 mL    Vital1.5: 240 mL  Total IN: 783.6 mL    OUT:    Indwelling Catheter - Urethral (mL): 1045 mL    Other (mL): 334 mL  Total OUT: 1379 mL    Total NET: -595.4 mL        ============================ LABS =========================                        8.3    16.10 )-----------( 153      ( 05 Dec 2022 18:54 )             25.7     12-05    151<H>  |  113<H>  |  101<H>  ----------------------------<  130<H>  4.2   |  21<L>  |  4.19<H>    Ca    8.4      05 Dec 2022 00:50  Phos  5.9     12-05  Mg     2.5     12-05    TPro  5.7<L>  /  Alb  3.0<L>  /  TBili  1.0  /  DBili  x   /  AST  55<H>  /  ALT  9<L>  /  AlkPhos  224<H>  12-05    LIVER FUNCTIONS - ( 05 Dec 2022 00:50 )  Alb: 3.0 g/dL / Pro: 5.7 g/dL / ALK PHOS: 224 U/L / ALT: 9 U/L / AST: 55 U/L / GGT: x           PT/INR - ( 05 Dec 2022 13:57 )   PT: 17.7 sec;   INR: 1.52 ratio         PTT - ( 05 Dec 2022 18:54 )  PTT:51.6 sec  ABG - ( 05 Dec 2022 08:25 )  pH, Arterial: 7.38  pH, Blood: x     /  pCO2: 35    /  pO2: 141   / HCO3: 21    / Base Excess: -4.0  /  SaO2: 98.4                ======================Micro/Rad/Cardio=================  Culture: Reviewed   CXR: Reviewed  Echo: Reviewed  ======================================================  PAST MEDICAL & SURGICAL HISTORY:      ======================================================  Assessment:  63 y/o male with PMH of CABG, DM, HTN, HLD presenting as transfer from [place] for c/f STEMI. PTA arrival received 325 aspirin, 600 plavix, and no heparin drip started. Around 1:30 am patient had multiple episodes of non-bloody diarrhea and emesis with associated abdominal pain and chest pain, unable to localize, non-radiating, with associated SOB and no associated palpitations or diaphoresis. No recent fevers/chills, cough, rashes, or changes in urination. Does report mild diffuse back pain; started 5 days ago in setting on injury while walking and lifting objects. Denies focal weakness, numbness/tingling, or LOC due does report mild dizziness.    Cardiac arrest s/p [facility] 11/25  Hemodynamic instability  Hypovolemia  Post op respiratory insufficiency  Acute blood loss anemia  Leukocytosis       ======================================================  Plan:   ***Neuro***  CT head showed 4mm subdural hematoma 11/26: repeat CT head unchanged 12/01  Plan for additional Repeat CT for Pancreatitis assessment   [x] Sedated with [x] Precedex   Propofol weaned off   Post operative neuro assessment   PRN Dilaudid for pain      ***Cardiovascular***  12/3 TTE: EF 30-35%, moderate-severe LV dysfunction  Invasive hemodynamic monitoring, assess perfusion indices   SR / CVP 5/ MAP 72/ Hct 25.7/ Lactate 0.9  [x] Levophed 0.05 mcg/kg/min, MAP goal 65-75  [x] ECMO- 3600 rpm, 4.1 flow  Continuos reassessment of hemodynamics   [x] Statin  [x] AC Therapy with Argatroban gtt for ECMO circuit (PTT 40-45)  cardiac cath tomorrow       ***Pulmonary***  Post op vent management   Titration of FiO2 and PEEP, follow SpO2, CXR, blood gasses   100% FiO2 on ECMO  Nitric oxide weaned off 12/3    Mode: AC/ CMV (Assist Control/ Continuous Mandatory Ventilation)  RR (machine): 14  FiO2: 40  PEEP: 12  ITime: 1  MAP: 17  PC: 10  PIP: 25              ***GI***  [x] NPO with TF's Vital AF @ 20cc/hr (@goal)  [x] Protonix    Bowel regimen with Miralax and senna       ***Renal***  ERIC, renal following  Restarted CVVHD today  Even fluid goal  Continue to monitor I/Os, BUN/Creatinine.   Replete lytes PRN  De Dios present  hypernatremia, Free Water [id number]      ***ID***  No active antibiotic coverage      ***Endocrine***  [x]  DM2: HbA1c 7.3%                - [x] Insulin gtt              - Need tight glycemic control to prevent wound infection.    ***GOC***  Next family meeting 12/6 @ 130pm            Patient requires continuous monitoring with bedside rhythm monitoring, arterial line, pulse oximetry, ventilator monitoring; intermittent blood gas analysis. Care plan discussed with ICU care team. Patient remains critical; required more than usual ICU care.    patient remain critical; required more than usual post op care; I have spent 30 minutes providing non routine post op care, revaluated multiple times during the day .     Care Plan discussed with the ICU care team. Patient remained critical, at risk for life threatening decompensation.     By signing my name below, I, Abhijit Ngo, attest that this documentation has been prepared under the direction and in the presence of Yusef Millan MD.  Electronically signed: Brian Slaughter, 12-05-22 @ 19:48    I, Monty Holbrook, personally performed the services described in this documentation. all medical record entries made by the scribe were at my direction and in my presence. I have reviewed the chart and agree that the record reflects my personal performance and is accurate and complete  Electronically signed: Yusef Millan MD. Patient seen and examined at the bedside.    Remained critically ill on continuous ICU monitoring.    OBJECTIVE:  Vital Signs Last 24 Hrs  T(C): 36.8 (05 Dec 2022 08:00), Max: 37.3 (04 Dec 2022 20:00)  T(F): 98.2 (05 Dec 2022 08:00), Max: 99.1 (04 Dec 2022 20:00)  HR: 92 (05 Dec 2022 19:45) (77 - 100)  BP: --  BP(mean): --  RR: 14 (05 Dec 2022 19:45) (14 - 33)  SpO2: 100% (05 Dec 2022 19:45) (94% - 100%)    Parameters below as of 05 Dec 2022 12:00  Patient On (Oxygen Delivery Method): ventilator    O2 Concentration (%): 40      Physical Exam:   General: Intubated, multiple lines gtt  Neurology: Sedated, off sedation follows simple commands, COBB x4  Eyes: bilateral pupils equal and reactive   ENT/Neck: +ETT midline, Neck supple, trachea midline, No JVD   Respiratory: rhonchi bilaterally   CV: S1S2, no murmurs        [x] Sinus rhythm  Abdominal: Softly distended,+BS   Extremities: 1+ pedal edema noted, + peripheral pulses palpable, warm  Skin: No Rashes, Hematoma, Ecchymosis                              ============================I/O===========================   I&O's Detail    04 Dec 2022 07:01  -  05 Dec 2022 07:00  --------------------------------------------------------  IN:    Argatroban: 14.4 mL    Dexmedetomidine: 835.2 mL    Enteral Tube Flush: 120 mL    Free Water: 500 mL    Insulin: 85 mL    Norepinephrine: 24.7 mL    Propofol: 201.6 mL    sodium chloride 0.9%: 120 mL    Vital1.5: 480 mL  Total IN: 2380.9 mL    OUT:    Indwelling Catheter - Urethral (mL): 2615 mL    NiCARdipine: 0 mL    Sodium Bicarbonate: 0 mL  Total OUT: 2615 mL    Total NET: -234.1 mL      05 Dec 2022 07:01  -  05 Dec 2022 19:48  --------------------------------------------------------  IN:    Argatroban: 4.8 mL    Argatroban: 1.6 mL    Dexmedetomidine: 417.6 mL    Insulin: 30 mL    Norepinephrine: 10 mL    Propofol: 19.6 mL    sodium chloride 0.9%: 60 mL    Vital1.5: 240 mL  Total IN: 783.6 mL    OUT:    Indwelling Catheter - Urethral (mL): 1045 mL    Other (mL): 334 mL  Total OUT: 1379 mL    Total NET: -595.4 mL        ============================ LABS =========================                        8.3    16.10 )-----------( 153      ( 05 Dec 2022 18:54 )             25.7     12-05    151<H>  |  113<H>  |  101<H>  ----------------------------<  130<H>  4.2   |  21<L>  |  4.19<H>    Ca    8.4      05 Dec 2022 00:50  Phos  5.9     12-05  Mg     2.5     12-05    TPro  5.7<L>  /  Alb  3.0<L>  /  TBili  1.0  /  DBili  x   /  AST  55<H>  /  ALT  9<L>  /  AlkPhos  224<H>  12-05    LIVER FUNCTIONS - ( 05 Dec 2022 00:50 )  Alb: 3.0 g/dL / Pro: 5.7 g/dL / ALK PHOS: 224 U/L / ALT: 9 U/L / AST: 55 U/L / GGT: x           PT/INR - ( 05 Dec 2022 13:57 )   PT: 17.7 sec;   INR: 1.52 ratio         PTT - ( 05 Dec 2022 18:54 )  PTT:51.6 sec  ABG - ( 05 Dec 2022 08:25 )  pH, Arterial: 7.38  pH, Blood: x     /  pCO2: 35    /  pO2: 141   / HCO3: 21    / Base Excess: -4.0  /  SaO2: 98.4                ======================Micro/Rad/Cardio=================  Culture: Reviewed   CXR: Reviewed  Echo: Reviewed  ======================================================  PAST MEDICAL & SURGICAL HISTORY:      ======================================================  Assessment:  63 y/o male with PMH of CABG, DM, HTN, HLD presenting as transfer from [place] for c/f STEMI. PTA arrival received 325 aspirin, 600 plavix, and no heparin drip started. Around 1:30 am patient had multiple episodes of non-bloody diarrhea and emesis with associated abdominal pain and chest pain, unable to localize, non-radiating, with associated SOB and no associated palpitations or diaphoresis. No recent fevers/chills, cough, rashes, or changes in urination. Does report mild diffuse back pain; started 5 days ago in setting on injury while walking and lifting objects. Denies focal weakness, numbness/tingling, or LOC due does report mild dizziness.    Cardiac arrest s/p [facility] ECM[facility] 11/25  Hemodynamic instability  Hypovolemia  Post op respiratory insufficiency  Acute blood loss anemia  Leukocytosis       ======================================================  Plan:   ***Neuro***  CT head showed 4mm subdural hematoma 11/26: repeat CT head unchanged 12/01  Plan for additional Repeat CT for Pancreatitis assessment   [x] Sedated with [x] Precedex   Propofol weaned off   Post operative neuro assessment   PRN Dilaudid for pain      ***Cardiovascular***  12/3 TTE: EF 30-35%, moderate-severe LV dysfunction  Invasive hemodynamic monitoring, assess perfusion indices   SR / CVP 5/ MAP 72/ Hct 25.7/ Lactate 0.9  [x] Levophed 0.05 mcg/kg/min, MAP goal 65-75  [x] ECMO- 3600 rpm, 4.1 flow  Continuos reassessment of hemodynamics   [x] Statin  [x] AC Therapy with Argatroban gtt for ECMO circuit (PTT 40-45)  cardiac cath tomorrow       ***Pulmonary***  Post op vent management   Titration of FiO2 and PEEP, follow SpO2, CXR, blood gasses   100% FiO2 on ECMO  Nitric oxide weaned off 12/3    Mode: AC/ CMV (Assist Control/ Continuous Mandatory Ventilation)  RR (machine): 14  FiO2: 40  PEEP: 12  ITime: 1  MAP: 17  PC: 10  PIP: 25              ***GI***  [x] NPO with TF's Vital AF @ 20cc/hr (@goal)  [x] Protonix    Bowel regimen with Miralax and senna       ***Renal***  ERIC, renal following  Restarted CVVHD today  Even fluid goal  Continue to monitor I/Os, BUN/Creatinine.   Replete lytes PRN  De Dios present  hypernatremia, Free Water [id number]      ***ID***  No active antibiotic coverage      ***Endocrine***  [x]  DM2: HbA1c 7.3%                - [x] Insulin gtt              - Need tight glycemic control to prevent wound infection.    ***GOC***  Next family meeting 12/6 @ 130pm            Patient requires continuous monitoring with bedside rhythm monitoring, arterial line, pulse oximetry, ventilator monitoring; intermittent blood gas analysis. Care plan discussed with ICU care team. Patient remains critical; required more than usual ICU care.    patient remain critical; required more than usual post op care; I have spent 30 minutes providing non routine post op care, revaluated multiple times during the day .     Care Plan discussed with the ICU care team. Patient remained critical, at risk for life threatening decompensation.     By signing my name below, I, Abhijit Ngo, attest that this documentation has been prepared under the direction and in the presence of Yusef Millan MD.  Electronically signed: Brian Slaughter, 12-05-22 @ 19:48    I, Monty Holbrook, personally performed the services described in this documentation. all medical record entries made by the scribe were at my direction and in my presence. I have reviewed the chart and agree that the record reflects my personal performance and is accurate and complete  Electronically signed: Yusef Millan MD. Patient seen and examined at the bedside.    Remained critically ill on continuous ICU monitoring.    OBJECTIVE:  Vital Signs Last 24 Hrs  T(C): 36.8 (05 Dec 2022 08:00), Max: 37.3 (04 Dec 2022 20:00)  T(F): 98.2 (05 Dec 2022 08:00), Max: 99.1 (04 Dec 2022 20:00)  HR: 92 (05 Dec 2022 19:45) (77 - 100)  BP: --  BP(mean): --  RR: 14 (05 Dec 2022 19:45) (14 - 33)  SpO2: 100% (05 Dec 2022 19:45) (94% - 100%)    Parameters below as of 05 Dec 2022 12:00  Patient On (Oxygen Delivery Method): ventilator    O2 Concentration (%): 40      Physical Exam:   General: Intubated, multiple lines gtt  Neurology: Sedated, off sedation follows simple commands, COBB x4  Eyes: bilateral pupils equal and reactive   ENT/Neck: +ETT midline, Neck supple, trachea midline, No JVD   Respiratory: rhonchi bilaterally   CV: S1S2, no murmurs        [x] Sinus rhythm  Abdominal: Softly distended,+BS   Extremities: 1+ pedal edema noted, + peripheral pulses palpable, warm  Skin: No Rashes, Hematoma, Ecchymosis                              ============================I/O===========================   I&O's Detail    04 Dec 2022 07:01  -  05 Dec 2022 07:00  --------------------------------------------------------  IN:    Argatroban: 14.4 mL    Dexmedetomidine: 835.2 mL    Enteral Tube Flush: 120 mL    Free Water: 500 mL    Insulin: 85 mL    Norepinephrine: 24.7 mL    Propofol: 201.6 mL    sodium chloride 0.9%: 120 mL    Vital1.5: 480 mL  Total IN: 2380.9 mL    OUT:    Indwelling Catheter - Urethral (mL): 2615 mL    NiCARdipine: 0 mL    Sodium Bicarbonate: 0 mL  Total OUT: 2615 mL    Total NET: -234.1 mL      05 Dec 2022 07:01  -  05 Dec 2022 19:48  --------------------------------------------------------  IN:    Argatroban: 4.8 mL    Argatroban: 1.6 mL    Dexmedetomidine: 417.6 mL    Insulin: 30 mL    Norepinephrine: 10 mL    Propofol: 19.6 mL    sodium chloride 0.9%: 60 mL    Vital1.5: 240 mL  Total IN: 783.6 mL    OUT:    Indwelling Catheter - Urethral (mL): 1045 mL    Other (mL): 334 mL  Total OUT: 1379 mL    Total NET: -595.4 mL        ============================ LABS =========================                        8.3    16.10 )-----------( 153      ( 05 Dec 2022 18:54 )             25.7     12-05    151<H>  |  113<H>  |  101<H>  ----------------------------<  130<H>  4.2   |  21<L>  |  4.19<H>    Ca    8.4      05 Dec 2022 00:50  Phos  5.9     12-05  Mg     2.5     12-05    TPro  5.7<L>  /  Alb  3.0<L>  /  TBili  1.0  /  DBili  x   /  AST  55<H>  /  ALT  9<L>  /  AlkPhos  224<H>  12-05    LIVER FUNCTIONS - ( 05 Dec 2022 00:50 )  Alb: 3.0 g/dL / Pro: 5.7 g/dL / ALK PHOS: 224 U/L / ALT: 9 U/L / AST: 55 U/L / GGT: x           PT/INR - ( 05 Dec 2022 13:57 )   PT: 17.7 sec;   INR: 1.52 ratio         PTT - ( 05 Dec 2022 18:54 )  PTT:51.6 sec  ABG - ( 05 Dec 2022 08:25 )  pH, Arterial: 7.38  pH, Blood: x     /  pCO2: 35    /  pO2: 141   / HCO3: 21    / Base Excess: -4.0  /  SaO2: 98.4                ======================Micro/Rad/Cardio=================  Culture: Reviewed   CXR: Reviewed  Echo: Reviewed  ======================================================  PAST MEDICAL & SURGICAL HISTORY:      ======================================================  Assessment:  63 y/o male with PMH of CABG, DM, HTN, HLD presenting as transfer from [place] for c/f STEMI. PTA arrival received 325 aspirin, 600 plavix, and no heparin drip started. Around 1:30 am patient had multiple episodes of non-bloody diarrhea and emesis with associated abdominal pain and chest pain, unable to localize, non-radiating, with associated SOB and no associated palpitations or diaphoresis. No recent fevers/chills, cough, rashes, or changes in urination. Does report mild diffuse back pain; started 5 days ago in setting on injury while walking and lifting objects. Denies focal weakness, numbness/tingling, or LOC due does report mild dizziness.    Cardiac arrest s/p [facility] ECMO 11/25  Hemodynamic instability  Hypovolemia  Post op respiratory insufficiency  Acute blood loss anemia  Leukocytosis       ======================================================  Plan:   ***Neuro***  CT head showed 4mm subdural hematoma 11/26: repeat CT head unchanged 12/01  Plan for additional Repeat CT for Pancreatitis assessment   [x] Sedated with [x] Precedex   Propofol weaned off   Post operative neuro assessment   PRN Dilaudid for pain      ***Cardiovascular***  12/3 TTE: EF 30-35%, moderate-severe LV dysfunction  Invasive hemodynamic monitoring, assess perfusion indices   SR / CVP 5/ MAP 72/ Hct 25.7/ Lactate 0.9  [x] Levophed 0.05 mcg/kg/min, MAP goal 65-75  [x] ECMO- 3600 rpm, 4.1 flow  Continuos reassessment of hemodynamics   [x] Statin  [x] AC Therapy with Argatroban gtt for ECMO circuit (PTT 40-45)  cardiac cath tomorrow       ***Pulmonary***  Post op vent management   Titration of FiO2 and PEEP, follow SpO2, CXR, blood gasses   100% FiO2 on ECMO  Nitric oxide weaned off 12/3    Mode: AC/ CMV (Assist Control/ Continuous Mandatory Ventilation)  RR (machine): 14  FiO2: 40  PEEP: 12  ITime: 1  MAP: 17  PC: 10  PIP: 25              ***GI***  [x] NPO with TF's Vital AF @ 20cc/hr (@goal)  [x] Protonix    Bowel regimen with Miralax and senna       ***Renal***  ERIC, renal following  Restarted CVVHD today  Even fluid goal  Continue to monitor I/Os, BUN/Creatinine.   Replete lytes PRN  De Dios present  hypernatremia, Free Water [id number]      ***ID***  No active antibiotic coverage      ***Endocrine***  [x]  DM2: HbA1c 7.3%                - [x] Insulin gtt              - Need tight glycemic control to prevent wound infection.    ***GOC***  Next family meeting 12/6 @ 130pm            Patient requires continuous monitoring with bedside rhythm monitoring, arterial line, pulse oximetry, ventilator monitoring; intermittent blood gas analysis. Care plan discussed with ICU care team. Patient remains critical; required more than usual ICU care.    patient remain critical; required more than usual post op care; I have spent 30 minutes providing non routine post op care, revaluated multiple times during the day .     Care Plan discussed with the ICU care team. Patient remained critical, at risk for life threatening decompensation.     By signing my name below, I, Abhijit Ngo, attest that this documentation has been prepared under the direction and in the presence of Yusef Millan MD.  Electronically signed: Brian Slaughter, 12-05-22 @ 19:48    I, Monty Holbrook, personally performed the services described in this documentation. all medical record entries made by the scribe were at my direction and in my presence. I have reviewed the chart and agree that the record reflects my personal performance and is accurate and complete  Electronically signed: Yusef Millan MD. Patient seen and examined at the bedside.    Remained critically ill on continuous ICU monitoring.    OBJECTIVE:  Vital Signs Last 24 Hrs  T(C): 36.8 (05 Dec 2022 08:00), Max: 37.3 (04 Dec 2022 20:00)  T(F): 98.2 (05 Dec 2022 08:00), Max: 99.1 (04 Dec 2022 20:00)  HR: 92 (05 Dec 2022 19:45) (77 - 100)  BP: --  BP(mean): --  RR: 14 (05 Dec 2022 19:45) (14 - 33)  SpO2: 100% (05 Dec 2022 19:45) (94% - 100%)    Parameters below as of 05 Dec 2022 12:00  Patient On (Oxygen Delivery Method): ventilator    O2 Concentration (%): 40      Physical Exam:   General: Intubated, multiple lines gtt  Neurology: Sedated, off sedation follows simple commands, COBB x4  Eyes: bilateral pupils equal and reactive   ENT/Neck: +ETT midline, Neck supple, trachea midline, No JVD   Respiratory: rhonchi bilaterally   CV: S1S2, no murmurs        [x] Sinus rhythm  Abdominal: Softly distended,+BS   Extremities: 1+ pedal edema noted, + peripheral pulses palpable, warm  Skin: No Rashes, Hematoma, Ecchymosis                              ============================I/O===========================   I&O's Detail    04 Dec 2022 07:01  -  05 Dec 2022 07:00  --------------------------------------------------------  IN:    Argatroban: 14.4 mL    Dexmedetomidine: 835.2 mL    Enteral Tube Flush: 120 mL    Free Water: 500 mL    Insulin: 85 mL    Norepinephrine: 24.7 mL    Propofol: 201.6 mL    sodium chloride 0.9%: 120 mL    Vital1.5: 480 mL  Total IN: 2380.9 mL    OUT:    Indwelling Catheter - Urethral (mL): 2615 mL    NiCARdipine: 0 mL    Sodium Bicarbonate: 0 mL  Total OUT: 2615 mL    Total NET: -234.1 mL      05 Dec 2022 07:01  -  05 Dec 2022 19:48  --------------------------------------------------------  IN:    Argatroban: 4.8 mL    Argatroban: 1.6 mL    Dexmedetomidine: 417.6 mL    Insulin: 30 mL    Norepinephrine: 10 mL    Propofol: 19.6 mL    sodium chloride 0.9%: 60 mL    Vital1.5: 240 mL  Total IN: 783.6 mL    OUT:    Indwelling Catheter - Urethral (mL): 1045 mL    Other (mL): 334 mL  Total OUT: 1379 mL    Total NET: -595.4 mL        ============================ LABS =========================                        8.3    16.10 )-----------( 153      ( 05 Dec 2022 18:54 )             25.7     12-05    151<H>  |  113<H>  |  101<H>  ----------------------------<  130<H>  4.2   |  21<L>  |  4.19<H>    Ca    8.4      05 Dec 2022 00:50  Phos  5.9     12-05  Mg     2.5     12-05    TPro  5.7<L>  /  Alb  3.0<L>  /  TBili  1.0  /  DBili  x   /  AST  55<H>  /  ALT  9<L>  /  AlkPhos  224<H>  12-05    LIVER FUNCTIONS - ( 05 Dec 2022 00:50 )  Alb: 3.0 g/dL / Pro: 5.7 g/dL / ALK PHOS: 224 U/L / ALT: 9 U/L / AST: 55 U/L / GGT: x           PT/INR - ( 05 Dec 2022 13:57 )   PT: 17.7 sec;   INR: 1.52 ratio         PTT - ( 05 Dec 2022 18:54 )  PTT:51.6 sec  ABG - ( 05 Dec 2022 08:25 )  pH, Arterial: 7.38  pH, Blood: x     /  pCO2: 35    /  pO2: 141   / HCO3: 21    / Base Excess: -4.0  /  SaO2: 98.4                ======================Micro/Rad/Cardio=================  Culture: Reviewed   CXR: Reviewed  Echo: Reviewed  ======================================================  PAST MEDICAL & SURGICAL HISTORY:      ======================================================  Assessment:  61 y/o male with PMH of CABG, DM, HTN, HLD presenting as transfer from [place] for c/f STEMI. PTA arrival received 325 aspirin, 600 plavix, and no heparin drip started. Around 1:30 am patient had multiple episodes of non-bloody diarrhea and emesis with associated abdominal pain and chest pain, unable to localize, non-radiating, with associated SOB and no associated palpitations or diaphoresis. No recent fevers/chills, cough, rashes, or changes in urination. Does report mild diffuse back pain; started 5 days ago in setting on injury while walking and lifting objects. Denies focal weakness, numbness/tingling, or LOC due does report mild dizziness.    acute rspiratory distress , cardiogenic shock [facility]p [facility] 11/25  Hemodynamic instability  Hypovolemia  Acute blood loss anemia  acute pancreatitis      ======================================================  Plan:   ***Neuro***  CT head showed 4mm subdural hematoma 11/26: repeat CT head unchanged 12/01  Plan for additional Repeat CT for Pancreatitis assessment   [x] Sedated with [x] Precedex   Propofol weaned off   Post operative neuro assessment   PRN Dilaudid for pain      ***Cardiovascular***  12/3 TTE: EF 30-35%, moderate-severe LV dysfunction  Invasive hemodynamic monitoring, assess perfusion indices   SR / CVP 5/ MAP 72/ Hct 25.7/ Lactate 0.9  [x] Levophed 0.05 mcg/kg/min, MAP goal 65-75  [x] ECMO- 3600 rpm, 4.1 flow  Continuos reassessment of hemodynamics   [x] Statin  [x] AC Therapy with Argatroban gtt for ECMO circuit (PTT 40-45)  cardiac cath tomorrow       ***Pulmonary***  Post op vent management   Titration of FiO2 and PEEP, follow SpO2, CXR, blood gasses   100% FiO2 on ECMO  Nitric oxide weaned off 12/3    Mode: AC/ CMV (Assist Control/ Continuous Mandatory Ventilation)  RR (machine): 14  FiO2: 40  PEEP: 12  ITime: 1  MAP: 17  PC: 10  PIP: 25              ***GI***  [x] NPO with TF's Vital AF @ 20cc/hr (@goal)  [x] Protonix    Bowel regimen with Miralax and senna       ***Renal***  ERIC, renal following  Restarted CVVHD today  Even fluid goal  Continue to monitor I/Os, BUN/Creatinine.   Replete lytes PRN  De Dios present  hypernatremia, Free Water [id number]      ***ID***  No active antibiotic coverage      ***Endocrine***  [x]  DM2: HbA1c 7.3%                - [x] Insulin gtt              - Need tight glycemic control to prevent wound infection.    ***GOC***  Next family meeting 12/6 @ 130pm            Patient requires continuous monitoring with bedside rhythm monitoring, arterial line, pulse oximetry, ventilator monitoring; intermittent blood gas analysis. Care plan discussed with ICU care team. Patient remains critical; required more than usual ICU care.    patient remain critical; required more than usual post op care; I have spent 30 minutes providing non routine post op care, revaluated multiple times during the day .     Care Plan discussed with the ICU care team. Patient remained critical, at risk for life threatening decompensation.     By signing my name below, I, Abhijit Huber, attest that this documentation has been prepared under the direction and in the presence of Yusef Millan MD.  Electronically signed: Brian Slaughter, 12-05-22 @ 19:48    I, Monty Holbrook, personally performed the services described in this documentation. all medical record entries made by the scribe were at my direction and in my presence. I have reviewed the chart and agree that the record reflects my personal performance and is accurate and complete  Electronically signed: Yusef Millan MD. Patient seen and examined at the bedside.    Remained critically ill on continuous ICU monitoring.    OBJECTIVE:  Vital Signs Last 24 Hrs  T(C): 36.8 (05 Dec 2022 08:00), Max: 37.3 (04 Dec 2022 20:00)  T(F): 98.2 (05 Dec 2022 08:00), Max: 99.1 (04 Dec 2022 20:00)  HR: 92 (05 Dec 2022 19:45) (77 - 100)  BP: --  BP(mean): --  RR: 14 (05 Dec 2022 19:45) (14 - 33)  SpO2: 100% (05 Dec 2022 19:45) (94% - 100%)    Parameters below as of 05 Dec 2022 12:00  Patient On (Oxygen Delivery Method): ventilator    O2 Concentration (%): 40      Physical Exam:   General: Intubated, multiple lines gtt  Neurology: Sedated, off sedation follows simple commands, COBB x4  Eyes: bilateral pupils equal and reactive   ENT/Neck: +ETT midline, Neck supple, trachea midline, No JVD   Respiratory: rhonchi bilaterally   CV: S1S2, no murmurs        [x] Sinus rhythm  Abdominal: Softly distended,+BS   Extremities: 1+ pedal edema noted, + peripheral pulses palpable, warm  Skin: No Rashes, Hematoma, Ecchymosis                              ============================I/O===========================   I&O's Detail    04 Dec 2022 07:01  -  05 Dec 2022 07:00  --------------------------------------------------------  IN:    Argatroban: 14.4 mL    Dexmedetomidine: 835.2 mL    Enteral Tube Flush: 120 mL    Free Water: 500 mL    Insulin: 85 mL    Norepinephrine: 24.7 mL    Propofol: 201.6 mL    sodium chloride 0.9%: 120 mL    Vital1.5: 480 mL  Total IN: 2380.9 mL    OUT:    Indwelling Catheter - Urethral (mL): 2615 mL    NiCARdipine: 0 mL    Sodium Bicarbonate: 0 mL  Total OUT: 2615 mL    Total NET: -234.1 mL      05 Dec 2022 07:01  -  05 Dec 2022 19:48  --------------------------------------------------------  IN:    Argatroban: 4.8 mL    Argatroban: 1.6 mL    Dexmedetomidine: 417.6 mL    Insulin: 30 mL    Norepinephrine: 10 mL    Propofol: 19.6 mL    sodium chloride 0.9%: 60 mL    Vital1.5: 240 mL  Total IN: 783.6 mL    OUT:    Indwelling Catheter - Urethral (mL): 1045 mL    Other (mL): 334 mL  Total OUT: 1379 mL    Total NET: -595.4 mL        ============================ LABS =========================                        8.3    16.10 )-----------( 153      ( 05 Dec 2022 18:54 )             25.7     12-05    151<H>  |  113<H>  |  101<H>  ----------------------------<  130<H>  4.2   |  21<L>  |  4.19<H>    Ca    8.4      05 Dec 2022 00:50  Phos  5.9     12-05  Mg     2.5     12-05    TPro  5.7<L>  /  Alb  3.0<L>  /  TBili  1.0  /  DBili  x   /  AST  55<H>  /  ALT  9<L>  /  AlkPhos  224<H>  12-05    LIVER FUNCTIONS - ( 05 Dec 2022 00:50 )  Alb: 3.0 g/dL / Pro: 5.7 g/dL / ALK PHOS: 224 U/L / ALT: 9 U/L / AST: 55 U/L / GGT: x           PT/INR - ( 05 Dec 2022 13:57 )   PT: 17.7 sec;   INR: 1.52 ratio         PTT - ( 05 Dec 2022 18:54 )  PTT:51.6 sec  ABG - ( 05 Dec 2022 08:25 )  pH, Arterial: 7.38  pH, Blood: x     /  pCO2: 35    /  pO2: 141   / HCO3: 21    / Base Excess: -4.0  /  SaO2: 98.4                ======================Micro/Rad/Cardio=================  Culture: Reviewed   CXR: Reviewed  Echo: Reviewed  ======================================================  PAST MEDICAL & SURGICAL HISTORY:      ======================================================  Assessment:  63 y/o male with PMH of CABG, DM, HTN, HLD presenting as transfer from [place] for c/f STEMI. PTA arrival received 325 aspirin, 600 plavix, and no heparin drip started. Around 1:30 am patient had multiple episodes of non-bloody diarrhea and emesis with associated abdominal pain and chest pain, unable to localize, non-radiating, with associated SOB and no associated palpitations or diaphoresis. No recent fevers/chills, cough, rashes, or changes in urination. Does report mild diffuse back pain; started 5 days ago in setting on injury while walking and lifting objects. Denies focal weakness, numbness/tingling, or LOC due does report mild dizziness.    acute rspiratory distress , cardiogenic shock [facility] 11/25  Hemodynamic instability  Hypovolemia  Acute blood loss anemia  acute pancreatitis      ======================================================  Plan:   ***Neuro***  CT head showed 4mm subdural hematoma 11/26: repeat CT head unchanged 12/01  Plan for additional Repeat CT for Pancreatitis assessment   [x] Sedated with [x] Precedex   Propofol weaned off   Post operative neuro assessment   PRN Dilaudid for pain      ***Cardiovascular***  12/3 TTE: EF 30-35%, moderate-severe LV dysfunction  Invasive hemodynamic monitoring, assess perfusion indices   SR / CVP 5/ MAP 72/ Hct 25.7/ Lactate 0.9  [x] Levophed 0.05 mcg/kg/min, MAP goal 65-75  [x] ECMO- 3600 rpm, 4.1 flow  Continuos reassessment of hemodynamics   [x] Statin  [x] AC Therapy with Argatroban gtt for ECMO circuit (PTT 40-45)  cardiac cath tomorrow       ***Pulmonary***  Post op vent management   Titration of FiO2 and PEEP, follow SpO2, CXR, blood gasses   100% FiO2 on ECMO  Nitric oxide weaned off 12/3    Mode: AC/ CMV (Assist Control/ Continuous Mandatory Ventilation)  RR (machine): 14  FiO2: 40  PEEP: 12  ITime: 1  MAP: 17  PC: 10  PIP: 25              ***GI***  [x] NPO with TF's Vital AF @ 20cc/hr (@goal)  [x] Protonix    Bowel regimen with Miralax and senna       ***Renal***  ERIC, renal following  Restarted CVVHD today  Even fluid goal  Continue to monitor I/Os, BUN/Creatinine.   Replete lytes PRN  De Dios present  hypernatremia, Free Water [id number]      ***ID***  No active antibiotic coverage      ***Endocrine***  [x]  DM2: HbA1c 7.3%                - [x] Insulin gtt              - Need tight glycemic control to prevent wound infection.    ***GOC***  Next family meeting 12/6 @ 130pm            Patient requires continuous monitoring with bedside rhythm monitoring, arterial line, pulse oximetry, ventilator monitoring; intermittent blood gas analysis. Care plan discussed with ICU care team. Patient remains critical; required more than usual ICU care.    patient remain critical; required more than usual post op care; I have spent 30 minutes providing non routine post op care, revaluated multiple times during the day .     Care Plan discussed with the ICU care team. Patient remained critical, at risk for life threatening decompensation.     By signing my name below, I, Abhijit Huber, attest that this documentation has been prepared under the direction and in the presence of Yusef Millan MD.  Electronically signed: Brian Slaughter, 12-05-22 @ 19:48    I, Monty Holbrook, personally performed the services described in this documentation. all medical record entries made by the scribe were at my direction and in my presence. I have reviewed the chart and agree that the record reflects my personal performance and is accurate and complete  Electronically signed: Yusef Millan MD. Patient seen and examined at the bedside.    Remained critically ill on continuous ICU monitoring.    OBJECTIVE:  Vital Signs Last 24 Hrs  T(C): 36.8 (05 Dec 2022 08:00), Max: 37.3 (04 Dec 2022 20:00)  T(F): 98.2 (05 Dec 2022 08:00), Max: 99.1 (04 Dec 2022 20:00)  HR: 92 (05 Dec 2022 19:45) (77 - 100)  BP: --  BP(mean): --  RR: 14 (05 Dec 2022 19:45) (14 - 33)  SpO2: 100% (05 Dec 2022 19:45) (94% - 100%)    Parameters below as of 05 Dec 2022 12:00  Patient On (Oxygen Delivery Method): ventilator    O2 Concentration (%): 40      Physical Exam:   General: Intubated, multiple lines gtt  Neurology: Sedated, off sedation follows simple commands, COBB x4  Eyes: bilateral pupils equal and reactive   ENT/Neck: +ETT midline, Neck supple, trachea midline, No JVD   Respiratory: rhonchi bilaterally   CV: S1S2, no murmurs        [x] Sinus rhythm  Abdominal: Softly distended,+BS   Extremities: 1+ pedal edema noted, + peripheral pulses palpable, warm  Skin: No Rashes, Hematoma, Ecchymosis                              ============================I/O===========================   I&O's Detail    04 Dec 2022 07:01  -  05 Dec 2022 07:00  --------------------------------------------------------  IN:    Argatroban: 14.4 mL    Dexmedetomidine: 835.2 mL    Enteral Tube Flush: 120 mL    Free Water: 500 mL    Insulin: 85 mL    Norepinephrine: 24.7 mL    Propofol: 201.6 mL    sodium chloride 0.9%: 120 mL    Vital1.5: 480 mL  Total IN: 2380.9 mL    OUT:    Indwelling Catheter - Urethral (mL): 2615 mL    NiCARdipine: 0 mL    Sodium Bicarbonate: 0 mL  Total OUT: 2615 mL    Total NET: -234.1 mL      05 Dec 2022 07:01  -  05 Dec 2022 19:48  --------------------------------------------------------  IN:    Argatroban: 4.8 mL    Argatroban: 1.6 mL    Dexmedetomidine: 417.6 mL    Insulin: 30 mL    Norepinephrine: 10 mL    Propofol: 19.6 mL    sodium chloride 0.9%: 60 mL    Vital1.5: 240 mL  Total IN: 783.6 mL    OUT:    Indwelling Catheter - Urethral (mL): 1045 mL    Other (mL): 334 mL  Total OUT: 1379 mL    Total NET: -595.4 mL        ============================ LABS =========================                        8.3    16.10 )-----------( 153      ( 05 Dec 2022 18:54 )             25.7     12-05    151<H>  |  113<H>  |  101<H>  ----------------------------<  130<H>  4.2   |  21<L>  |  4.19<H>    Ca    8.4      05 Dec 2022 00:50  Phos  5.9     12-05  Mg     2.5     12-05    TPro  5.7<L>  /  Alb  3.0<L>  /  TBili  1.0  /  DBili  x   /  AST  55<H>  /  ALT  9<L>  /  AlkPhos  224<H>  12-05    LIVER FUNCTIONS - ( 05 Dec 2022 00:50 )  Alb: 3.0 g/dL / Pro: 5.7 g/dL / ALK PHOS: 224 U/L / ALT: 9 U/L / AST: 55 U/L / GGT: x           PT/INR - ( 05 Dec 2022 13:57 )   PT: 17.7 sec;   INR: 1.52 ratio         PTT - ( 05 Dec 2022 18:54 )  PTT:51.6 sec  ABG - ( 05 Dec 2022 08:25 )  pH, Arterial: 7.38  pH, Blood: x     /  pCO2: 35    /  pO2: 141   / HCO3: 21    / Base Excess: -4.0  /  SaO2: 98.4                ======================Micro/Rad/Cardio=================  Culture: Reviewed   CXR: Reviewed  Echo: Reviewed  ======================================================  PAST MEDICAL & SURGICAL HISTORY:      ======================================================  Assessment:  63 y/o male with PMH of CABG, DM, HTN, HLD presenting as transfer from [place] for c/f STEMI. PTA arrival received 325 aspirin, 600 plavix, and no heparin drip started. Around 1:30 am patient had multiple episodes of non-bloody diarrhea and emesis with associated abdominal pain and chest pain, unable to localize, non-radiating, with associated SOB and no associated palpitations or diaphoresis. No recent fevers/chills, cough, rashes, or changes in urination. Does report mild diffuse back pain; started 5 days ago in setting on injury while walking and lifting objects. Denies focal weakness, numbness/tingling, or LOC due does report mild dizziness.    acute rspiratory distress , cardiogenic shock [facility]p [facility] 11/25  Hemodynamic instability  Hypovolemia  Acute blood loss anemia  acute pancreatitis      ======================================================  Plan:   ***Neuro***  CT head showed 4mm subdural hematoma 11/26: repeat CT head unchanged 12/01  Plan for additional Repeat CT for Pancreatitis assessment   [x] Sedated with [x] Precedex   Propofol weaned off   Post operative neuro assessment   PRN Dilaudid for pain      ***Cardiovascular***  12/3 TTE: EF 30-35%, moderate-severe LV dysfunction  Invasive hemodynamic monitoring, assess perfusion indices   SR / CVP 5/ MAP 72/ Hct 25.7/ Lactate 0.9  [x] Levophed 0.05 mcg/kg/min, MAP goal 65-75  [x] ECMO- 3600 rpm, 4.1 flow  Continuos reassessment of hemodynamics   [x] Statin  [x] AC Therapy with Argatroban gtt for ECMO circuit (PTT 40-45)  cardiac cath tomorrow       ***Pulmonary***  Post op vent management   Titration of FiO2 and PEEP, follow SpO2, CXR, blood gasses   100% FiO2 on ECMO  Nitric oxide weaned off 12/3    Mode: AC/ CMV (Assist Control/ Continuous Mandatory Ventilation)  RR (machine): 14  FiO2: 40  PEEP: 12  ITime: 1  MAP: 17  PC: 10  PIP: 25              ***GI***  [x] NPO with TF's Vital AF @ 20cc/hr (@goal)  [x] Protonix    Bowel regimen with Miralax and senna       ***Renal***  ERIC, renal following  Restarted CVVHD today  Even fluid goal  Continue to monitor I/Os, BUN/Creatinine.   Replete lytes PRN  De Dios present  hypernatremia, Free Water [id number]      ***ID***  No active antibiotic coverage      ***Endocrine***  [x]  DM2: HbA1c 7.3%                - [x] Insulin gtt              - Need tight glycemic control to prevent wound infection.    ***GOC***  Next family meeting 12/6 @ 130pm            Patient requires continuous monitoring with bedside rhythm monitoring, arterial line, pulse oximetry, ventilator monitoring; intermittent blood gas analysis. Care plan discussed with ICU care team. Patient remains critical; required more than usual ICU care.    patient remain critical; required more than usual post op care; I have spent 30 minutes providing non routine post op care, revaluated multiple times during the day .     Care Plan discussed with the ICU care team. Patient remained critical, at risk for life threatening decompensation.     By signing my name below, I, Abhijit Huber, attest that this documentation has been prepared under the direction and in the presence of Yusef Millan MD.  Electronically signed: Brian Slaughter, 12-05-22 @ 19:48    I, Monty Holbrook, personally performed the services described in this documentation. all medical record entries made by the scribe were at my direction and in my presence. I have reviewed the chart and agree that the record reflects my personal performance and is accurate and complete  Electronically signed: Yusef Millan MD.

## 2022-12-06 LAB
ALBUMIN SERPL ELPH-MCNC: 2.9 G/DL — LOW (ref 3.3–5)
ALBUMIN SERPL ELPH-MCNC: 3.1 G/DL — LOW (ref 3.3–5)
ALP SERPL-CCNC: 231 U/L — HIGH (ref 40–120)
ALP SERPL-CCNC: 261 U/L — HIGH (ref 40–120)
ALT FLD-CCNC: 6 U/L — LOW (ref 10–45)
ALT FLD-CCNC: 9 U/L — LOW (ref 10–45)
AMYLASE P1 CFR SERPL: 356 U/L — HIGH (ref 25–125)
ANION GAP SERPL CALC-SCNC: 15 MMOL/L — SIGNIFICANT CHANGE UP (ref 5–17)
ANION GAP SERPL CALC-SCNC: 18 MMOL/L — HIGH (ref 5–17)
APTT BLD: 43.2 SEC — HIGH (ref 27.5–35.5)
APTT BLD: 44.7 SEC — HIGH (ref 27.5–35.5)
APTT BLD: 45.1 SEC — HIGH (ref 27.5–35.5)
APTT BLD: 45.6 SEC — HIGH (ref 27.5–35.5)
AST SERPL-CCNC: 60 U/L — HIGH (ref 10–40)
AST SERPL-CCNC: 67 U/L — HIGH (ref 10–40)
BASE EXCESS BLDV CALC-SCNC: -2.8 MMOL/L — LOW (ref -2–3)
BASE EXCESS BLDV CALC-SCNC: 0.3 MMOL/L — SIGNIFICANT CHANGE UP (ref -2–3)
BILIRUB SERPL-MCNC: 1.3 MG/DL — HIGH (ref 0.2–1.2)
BILIRUB SERPL-MCNC: 1.4 MG/DL — HIGH (ref 0.2–1.2)
BUN SERPL-MCNC: 50 MG/DL — HIGH (ref 7–23)
BUN SERPL-MCNC: 61 MG/DL — HIGH (ref 7–23)
CALCIUM SERPL-MCNC: 8.2 MG/DL — LOW (ref 8.4–10.5)
CALCIUM SERPL-MCNC: 8.9 MG/DL — SIGNIFICANT CHANGE UP (ref 8.4–10.5)
CHLORIDE SERPL-SCNC: 102 MMOL/L — SIGNIFICANT CHANGE UP (ref 96–108)
CHLORIDE SERPL-SCNC: 105 MMOL/L — SIGNIFICANT CHANGE UP (ref 96–108)
CO2 BLDV-SCNC: 25 MMOL/L — SIGNIFICANT CHANGE UP (ref 22–26)
CO2 BLDV-SCNC: 27 MMOL/L — HIGH (ref 22–26)
CO2 SERPL-SCNC: 21 MMOL/L — LOW (ref 22–31)
CO2 SERPL-SCNC: 23 MMOL/L — SIGNIFICANT CHANGE UP (ref 22–31)
CREAT SERPL-MCNC: 2.28 MG/DL — HIGH (ref 0.5–1.3)
CREAT SERPL-MCNC: 2.51 MG/DL — HIGH (ref 0.5–1.3)
CULTURE RESULTS: SIGNIFICANT CHANGE UP
EGFR: 28 ML/MIN/1.73M2 — LOW
EGFR: 32 ML/MIN/1.73M2 — LOW
FIBRINOGEN PPP-MCNC: 159 MG/DL — LOW (ref 200–445)
GAS PNL BLDA: SIGNIFICANT CHANGE UP
GLUCOSE BLDC GLUCOMTR-MCNC: 106 MG/DL — HIGH (ref 70–99)
GLUCOSE BLDC GLUCOMTR-MCNC: 108 MG/DL — HIGH (ref 70–99)
GLUCOSE BLDC GLUCOMTR-MCNC: 110 MG/DL — HIGH (ref 70–99)
GLUCOSE BLDC GLUCOMTR-MCNC: 121 MG/DL — HIGH (ref 70–99)
GLUCOSE BLDC GLUCOMTR-MCNC: 122 MG/DL — HIGH (ref 70–99)
GLUCOSE BLDC GLUCOMTR-MCNC: 126 MG/DL — HIGH (ref 70–99)
GLUCOSE BLDC GLUCOMTR-MCNC: 127 MG/DL — HIGH (ref 70–99)
GLUCOSE BLDC GLUCOMTR-MCNC: 128 MG/DL — HIGH (ref 70–99)
GLUCOSE BLDC GLUCOMTR-MCNC: 131 MG/DL — HIGH (ref 70–99)
GLUCOSE BLDC GLUCOMTR-MCNC: 133 MG/DL — HIGH (ref 70–99)
GLUCOSE BLDC GLUCOMTR-MCNC: 135 MG/DL — HIGH (ref 70–99)
GLUCOSE BLDC GLUCOMTR-MCNC: 140 MG/DL — HIGH (ref 70–99)
GLUCOSE BLDC GLUCOMTR-MCNC: 142 MG/DL — HIGH (ref 70–99)
GLUCOSE BLDC GLUCOMTR-MCNC: 145 MG/DL — HIGH (ref 70–99)
GLUCOSE BLDC GLUCOMTR-MCNC: 146 MG/DL — HIGH (ref 70–99)
GLUCOSE BLDC GLUCOMTR-MCNC: 159 MG/DL — HIGH (ref 70–99)
GLUCOSE SERPL-MCNC: 125 MG/DL — HIGH (ref 70–99)
GLUCOSE SERPL-MCNC: 141 MG/DL — HIGH (ref 70–99)
HAPTOGLOB SERPL-MCNC: 171 MG/DL — SIGNIFICANT CHANGE UP (ref 34–200)
HCO3 BLDV-SCNC: 23 MMOL/L — SIGNIFICANT CHANGE UP (ref 22–29)
HCO3 BLDV-SCNC: 26 MMOL/L — SIGNIFICANT CHANGE UP (ref 22–29)
HCT VFR BLD CALC: 25.3 % — LOW (ref 39–50)
HCT VFR BLD CALC: 25.6 % — LOW (ref 39–50)
HGB BLD-MCNC: 8.3 G/DL — LOW (ref 13–17)
HOROWITZ INDEX BLDV+IHG-RTO: 100 — SIGNIFICANT CHANGE UP
HOROWITZ INDEX BLDV+IHG-RTO: 40 — SIGNIFICANT CHANGE UP
INR BLD: 1.32 RATIO — HIGH (ref 0.88–1.16)
LDH SERPL L TO P-CCNC: 442 U/L — HIGH (ref 50–242)
LIDOCAIN IGE QN: 737 U/L — HIGH (ref 7–60)
MAGNESIUM SERPL-MCNC: 2.3 MG/DL — SIGNIFICANT CHANGE UP (ref 1.6–2.6)
MCHC RBC-ENTMCNC: 29 PG — SIGNIFICANT CHANGE UP (ref 27–34)
MCHC RBC-ENTMCNC: 29.1 PG — SIGNIFICANT CHANGE UP (ref 27–34)
MCHC RBC-ENTMCNC: 32.4 GM/DL — SIGNIFICANT CHANGE UP (ref 32–36)
MCHC RBC-ENTMCNC: 32.8 GM/DL — SIGNIFICANT CHANGE UP (ref 32–36)
MCV RBC AUTO: 88.8 FL — SIGNIFICANT CHANGE UP (ref 80–100)
MCV RBC AUTO: 89.5 FL — SIGNIFICANT CHANGE UP (ref 80–100)
NRBC # BLD: 0 /100 WBCS — SIGNIFICANT CHANGE UP (ref 0–0)
PCO2 BLDV: 44 MMHG — SIGNIFICANT CHANGE UP (ref 42–55)
PCO2 BLDV: 45 MMHG — SIGNIFICANT CHANGE UP (ref 42–55)
PH BLDV: 7.33 — SIGNIFICANT CHANGE UP (ref 7.32–7.43)
PH BLDV: 7.37 — SIGNIFICANT CHANGE UP (ref 7.32–7.43)
PHOSPHATE SERPL-MCNC: 4.6 MG/DL — HIGH (ref 2.5–4.5)
PLATELET # BLD AUTO: 157 K/UL — SIGNIFICANT CHANGE UP (ref 150–400)
PLATELET # BLD AUTO: 162 K/UL — SIGNIFICANT CHANGE UP (ref 150–400)
PO2 BLDV: 36 MMHG — SIGNIFICANT CHANGE UP (ref 25–45)
PO2 BLDV: 47 MMHG — HIGH (ref 25–45)
POTASSIUM SERPL-MCNC: 3.9 MMOL/L — SIGNIFICANT CHANGE UP (ref 3.5–5.3)
POTASSIUM SERPL-MCNC: 4 MMOL/L — SIGNIFICANT CHANGE UP (ref 3.5–5.3)
POTASSIUM SERPL-SCNC: 3.9 MMOL/L — SIGNIFICANT CHANGE UP (ref 3.5–5.3)
POTASSIUM SERPL-SCNC: 4 MMOL/L — SIGNIFICANT CHANGE UP (ref 3.5–5.3)
PROT SERPL-MCNC: 5.7 G/DL — LOW (ref 6–8.3)
PROT SERPL-MCNC: 6.2 G/DL — SIGNIFICANT CHANGE UP (ref 6–8.3)
PROTHROM AB SERPL-ACNC: 15.2 SEC — HIGH (ref 10.5–13.4)
RBC # BLD: 2.85 M/UL — LOW (ref 4.2–5.8)
RBC # BLD: 2.86 M/UL — LOW (ref 4.2–5.8)
RBC # FLD: 15 % — HIGH (ref 10.3–14.5)
RBC # FLD: 15.6 % — HIGH (ref 10.3–14.5)
SAO2 % BLDV: 64 % — LOW (ref 67–88)
SAO2 % BLDV: 79.5 % — SIGNIFICANT CHANGE UP (ref 67–88)
SARS-COV-2 RNA SPEC QL NAA+PROBE: SIGNIFICANT CHANGE UP
SODIUM SERPL-SCNC: 141 MMOL/L — SIGNIFICANT CHANGE UP (ref 135–145)
SODIUM SERPL-SCNC: 143 MMOL/L — SIGNIFICANT CHANGE UP (ref 135–145)
SPECIMEN SOURCE: SIGNIFICANT CHANGE UP
WBC # BLD: 12.59 K/UL — HIGH (ref 3.8–10.5)
WBC # BLD: 16.49 K/UL — HIGH (ref 3.8–10.5)
WBC # FLD AUTO: 12.59 K/UL — HIGH (ref 3.8–10.5)
WBC # FLD AUTO: 16.49 K/UL — HIGH (ref 3.8–10.5)

## 2022-12-06 PROCEDURE — 99292 CRITICAL CARE ADDL 30 MIN: CPT

## 2022-12-06 PROCEDURE — 99233 SBSQ HOSP IP/OBS HIGH 50: CPT | Mod: GC

## 2022-12-06 PROCEDURE — 93567 NJX CAR CTH SPRVLV AORTGRPHY: CPT

## 2022-12-06 PROCEDURE — 99232 SBSQ HOSP IP/OBS MODERATE 35: CPT

## 2022-12-06 PROCEDURE — 33967 INSERT I-AORT PERCUT DEVICE: CPT

## 2022-12-06 PROCEDURE — 99291 CRITICAL CARE FIRST HOUR: CPT | Mod: GC

## 2022-12-06 PROCEDURE — 33949 ECMO/ECLS DAILY MGMT ARTERY: CPT

## 2022-12-06 PROCEDURE — 93459 L HRT ART/GRFT ANGIO: CPT | Mod: 26

## 2022-12-06 PROCEDURE — 99233 SBSQ HOSP IP/OBS HIGH 50: CPT

## 2022-12-06 PROCEDURE — 71045 X-RAY EXAM CHEST 1 VIEW: CPT | Mod: 26,76

## 2022-12-06 PROCEDURE — 99291 CRITICAL CARE FIRST HOUR: CPT

## 2022-12-06 PROCEDURE — 70450 CT HEAD/BRAIN W/O DYE: CPT | Mod: 26

## 2022-12-06 PROCEDURE — 74176 CT ABD & PELVIS W/O CONTRAST: CPT | Mod: 26

## 2022-12-06 PROCEDURE — 71250 CT THORAX DX C-: CPT | Mod: 26

## 2022-12-06 RX ORDER — CALCIUM GLUCONATE 100 MG/ML
2 VIAL (ML) INTRAVENOUS ONCE
Refills: 0 | Status: COMPLETED | OUTPATIENT
Start: 2022-12-06 | End: 2022-12-06

## 2022-12-06 RX ORDER — POTASSIUM CHLORIDE 20 MEQ
10 PACKET (EA) ORAL ONCE
Refills: 0 | Status: COMPLETED | OUTPATIENT
Start: 2022-12-06 | End: 2022-12-06

## 2022-12-06 RX ADMIN — HYDROMORPHONE HYDROCHLORIDE 0.5 MILLIGRAM(S): 2 INJECTION INTRAMUSCULAR; INTRAVENOUS; SUBCUTANEOUS at 11:45

## 2022-12-06 RX ADMIN — POLYETHYLENE GLYCOL 3350 17 GRAM(S): 17 POWDER, FOR SOLUTION ORAL at 05:15

## 2022-12-06 RX ADMIN — Medication 1 DROP(S): at 18:00

## 2022-12-06 RX ADMIN — HYDROMORPHONE HYDROCHLORIDE 0.5 MILLIGRAM(S): 2 INJECTION INTRAMUSCULAR; INTRAVENOUS; SUBCUTANEOUS at 17:15

## 2022-12-06 RX ADMIN — Medication 8.71 MICROGRAM(S)/KG/MIN: at 19:19

## 2022-12-06 RX ADMIN — Medication 1 DROP(S): at 00:00

## 2022-12-06 RX ADMIN — DEXMEDETOMIDINE HYDROCHLORIDE IN 0.9% SODIUM CHLORIDE 34.8 MICROGRAM(S)/KG/HR: 4 INJECTION INTRAVENOUS at 19:18

## 2022-12-06 RX ADMIN — HYDROMORPHONE HYDROCHLORIDE 0.5 MILLIGRAM(S): 2 INJECTION INTRAMUSCULAR; INTRAVENOUS; SUBCUTANEOUS at 00:15

## 2022-12-06 RX ADMIN — Medication 1 DROP(S): at 05:16

## 2022-12-06 RX ADMIN — HYDROMORPHONE HYDROCHLORIDE 0.5 MILLIGRAM(S): 2 INJECTION INTRAMUSCULAR; INTRAVENOUS; SUBCUTANEOUS at 05:31

## 2022-12-06 RX ADMIN — Medication 1 DROP(S): at 23:56

## 2022-12-06 RX ADMIN — HYDROMORPHONE HYDROCHLORIDE 0.5 MILLIGRAM(S): 2 INJECTION INTRAMUSCULAR; INTRAVENOUS; SUBCUTANEOUS at 00:00

## 2022-12-06 RX ADMIN — Medication 1 DROP(S): at 11:27

## 2022-12-06 RX ADMIN — CHLORHEXIDINE GLUCONATE 15 MILLILITER(S): 213 SOLUTION TOPICAL at 17:13

## 2022-12-06 RX ADMIN — INSULIN HUMAN 6 UNIT(S)/HR: 100 INJECTION, SOLUTION SUBCUTANEOUS at 19:19

## 2022-12-06 RX ADMIN — HYDROMORPHONE HYDROCHLORIDE 0.5 MILLIGRAM(S): 2 INJECTION INTRAMUSCULAR; INTRAVENOUS; SUBCUTANEOUS at 23:56

## 2022-12-06 RX ADMIN — ATORVASTATIN CALCIUM 40 MILLIGRAM(S): 80 TABLET, FILM COATED ORAL at 21:14

## 2022-12-06 RX ADMIN — CHLORHEXIDINE GLUCONATE 15 MILLILITER(S): 213 SOLUTION TOPICAL at 05:16

## 2022-12-06 RX ADMIN — POLYETHYLENE GLYCOL 3350 17 GRAM(S): 17 POWDER, FOR SOLUTION ORAL at 17:13

## 2022-12-06 RX ADMIN — PANTOPRAZOLE SODIUM 40 MILLIGRAM(S): 20 TABLET, DELAYED RELEASE ORAL at 05:15

## 2022-12-06 RX ADMIN — Medication 50 MILLIEQUIVALENT(S): at 20:46

## 2022-12-06 RX ADMIN — HYDROMORPHONE HYDROCHLORIDE 0.5 MILLIGRAM(S): 2 INJECTION INTRAMUSCULAR; INTRAVENOUS; SUBCUTANEOUS at 05:16

## 2022-12-06 RX ADMIN — Medication 200 GRAM(S): at 10:13

## 2022-12-06 RX ADMIN — ARGATROBAN 0.42 MICROGRAM(S)/KG/MIN: 50 INJECTION, SOLUTION INTRAVENOUS at 19:18

## 2022-12-06 RX ADMIN — SENNA PLUS 2 TABLET(S): 8.6 TABLET ORAL at 21:14

## 2022-12-06 RX ADMIN — HYDROMORPHONE HYDROCHLORIDE 0.5 MILLIGRAM(S): 2 INJECTION INTRAMUSCULAR; INTRAVENOUS; SUBCUTANEOUS at 11:27

## 2022-12-06 RX ADMIN — CHLORHEXIDINE GLUCONATE 1 APPLICATION(S): 213 SOLUTION TOPICAL at 05:18

## 2022-12-06 RX ADMIN — HYDROMORPHONE HYDROCHLORIDE 0.5 MILLIGRAM(S): 2 INJECTION INTRAMUSCULAR; INTRAVENOUS; SUBCUTANEOUS at 17:30

## 2022-12-06 RX ADMIN — PANTOPRAZOLE SODIUM 40 MILLIGRAM(S): 20 TABLET, DELAYED RELEASE ORAL at 17:12

## 2022-12-06 NOTE — PROGRESS NOTE ADULT - CONVERSATION DETAILS
Floyd  Farooq, ID #  [id number]  Thought the  helped with translating most ot the discussion to the patient's wife, at the end of it, his wife herself, asked for her children to continue to translate for her rather than continuing to use a language line representative.     In summary, Dr. 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 Also with SDH and ERIC now on HD). Her family was informed about an improvement on the patient's EF as well as CXR with signs of ARDS improvement. However, his family understands the patient remains critically ill. Dr. Severino indicated the patient went for cardiac cath with prelim results of CDA without options for stenting. He explained that current plan is for IBP placement hoping it may help the patient to be weaned off ECMO. It was explained that ECMO is a temporary device to support the patient's heart and lungs so they can recover. However, that ECMO is not a permanent solution and if it the patient is not able to be weaned of it that a discussion about changing gears to a "comfort" driven approach will have to be discussed. Reasonably, the patient's family was hoping that ECMO will be a bridge for improvement and not a bridge for nowhere. However, they understood they need to hope for the best but be ready for the worst. It was also sated that due to the contrast needed for cardiac cath that HD may become permanent. However, that only time will be able to define that.     All questions were answered.     Will meet again on next Tuesday. Floyd  Farooq, ID #  [id number]  Thought the  helped with translating most ot the discussion to the patient's wife, at the end of it, his wife herself, asked for her children to continue to translate for her rather than continuing to use a language line representative.     In summary, Dr. 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 Also with SDH and ERIC now on HD). Her family was informed about an improvement on the patient's EF as well as CXR with signs of ARDS improvement. However, his family understands the patient remains critically ill. Dr. Severino indicated the patient went for cardiac cath with prelim results of CDA without options for stenting. He explained that current plan is for IBP placement hoping it may help the patient to be weaned off ECMO. It was explained that ECMO is a temporary device to support the patient's heart and lungs so they can recover. However, that ECMO is not a permanent solution and if it the patient is not able to be weaned of it that a discussion about changing gears to a "comfort" driven approach will have to be discussed. Reasonably, the patient's family was hoping that ECMO will be a bridge for improvement and not a bridge for nowhere. However, they understood they need to hope for the best but be ready for the worst. It was also sated that due to the contrast needed for cardiac cath that HD may become permanent. However, that only time will be able to define that.     All questions were answered.     Will meet again on next Tuesday. Floyd  Farooq, ID #  [id number]  Thought the  helped with translating most ot the discussion to the patient's wife, at the end of it, his wife herself, asked for her children to continue to translate for her rather than continuing to use a language line representative.     In summary, Dr. 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 Also with SDH and ERIC now on HD). Her family was informed about an improvement on the patient's EF as well as CXR with signs of ARDS improvement. However, his family understands the patient remains critically ill. Dr. Severino indicated the patient went for cardiac cath with prelim results of CDA without options for stenting. He explained that current plan is for IBP placement hoping it may help the patient to be weaned off ECMO. It was explained that ECMO is a temporary device to support the patient's heart and lungs so they can recover. However, that ECMO is not a permanent solution and if it the patient is not able to be weaned of it that a discussion about changing gears to a "comfort" driven approach will have to be discussed. Reasonably, the patient's family was hoping that ECMO will be a bridge for improvement and not a bridge for nowhere. However, they understood they need to hope for the best but be ready for the worst. It was also sated that due to the contrast needed for cardiac cath that HD may become permanent. However, that only time will be able to define that.     All questions were answered.     Will meet again on next Tuesday.

## 2022-12-06 NOTE — CHART NOTE - NSCHARTNOTEFT_GEN_A_CORE
Nutrition Follow Up Note  Patient seen for: Malnutrition initial follow-up. Chart reviewed, events noted.     Per chart, pt is 62M with PMH of CAD s/p 4v CABG ~2019 in [place], DM2 (A1c 7.3%), and HTN who initially presented to OSH with abdominal pain and n/v and found to have pancreatitis with lipase > 3000 though also with elevated troponins. CT abdomen revealed acute pancreatitis and his initial LVEF was 40-45%. He developed MSOF with hypoxic respiratory failure requiring intubation and ERIC and went into hypoxic PEA arrest requiring ACLS and due to persistent hypoxia and hypotension on pressors after ROSC was cannulated to [facility] (LFV, RFA, no anterograde perfusion catheter) on 11/25. Notably CTH that day revealed small subdural hemorrhage. ARDS improving, off antiobiotics. Pt tolerating ECMO weans, awaiting LHC/IABP placement today 12/06. Renal function improving, on CVVHD today 12/06.       Source: [] Patient       [x] EMR        [] RN        [] Family at bedside       [] Other:    -If unable to interview patient: [x] Trach/Vent/BiPAP  [] Disoriented/confused/inappropriate to interview    Nutrition-Related Events:   - Pressors:  [x] Yes    [] No   - Propofol:  [x] Yes    [] No - note propofol held per flowsheets in preparation for procedure 12/06.         - Rate: 10 mL/hr. If maintained x 24 hours, propofol will provide: 264kcal.     Diet Order:   Diet, NPO after Midnight:      NPO Start Date: 05-Dec-2022,   NPO Start Time: 23:59 (12-05-22)  Diet, NPO with Tube Feed:   Tube Feeding Modality: Nasogastric  Vital 1.5 [place] (VITAL1.5RTH)  Total Volume for 24 Hours (mL): 480  Continuous  Starting Tube Feed Rate {mL per Hour}: 20  Until Goal Tube Feed Rate (mL per Hour): 20  Tube Feed Duration (in Hours): 24  Tube Feed Start Time: 11:20 (12-03-22)    EN Order Provides: 480mL total volume, 720kcal (8kcal/kg daily wt 12/1 87.2kg), 32g pro (0.5g/kg IBW 69.8kg), & 367mL fluid.   - 12/06: 80mL, NPO  - 11/05: 480mL  - 11/04: 480mL  - 11/03: 160mL  - 11/02: 0mL  Pt meeting <50% estimated nutrient needs x5 days per flowsheets.     Is current diet order appropriate/adequate? [] Yes  [x]  No: see recommendations    Nutrition-related concerns:  - Pt remains intubated, sedated on precedex & propofol.   - Currently NPO in preparation for procedure 12/06, otherwise previously tolerating EN.  - ARDS improving per team.    - Renal function improving with CVVHD, diuretics discontinued.  - Hypernatremia improved 12/06, Na WNL.  - On insulin drip for glycemic control.   - On antibiotics for pancreatitis, pancreatic enzymes downtrending 12/06.   - On IV Fluids: sodium chloride 0.9%   - Per chart, pt ordered for insulin regular infusion 100U, norepinephrine, miralax, & senna.    GI: No nausea/vomiting/diarrhea/constipation per chart. Last recorded BM 12/05. Bowel Regimen? [x] Yes   [] No    Weights:   Daily Weight (kg): 90.4 (12/06), 91.5 (12/05), 89.4 (12/04), 90.2 (12/03), 87.2 (12/01), 92.2 (11/30)  Dosing Weight (kg): 92.9 (11/24)  Wt changes likely due to combination of fluid shifts & inadequate energy intake. Note edema may be masking additional weight loss. RD to continue monitoring wts as able.     Pertinent Labs:   Lipase 737 <H> (12/06)  Amylase 356 <H> (12/06)    POCT -104 <H> (12/04-12/06)  HgbA1c 7.3% <H> (11/25)    BUN 61 <H> (12/06)  Cr 2.51 <H> (12/06)  eGFR 28 <L> (12/06)     <H> (12/06)  ALT 9 <L> (12/06)  AST 67 <H> (12/06)  Triglycerides 161 <H> (12/05)    Na 143 WNL (12/06)  K+ 4.0 WNL (12/06)  Mg 2.3 WNL (12/06)  Phos 4.6 <H> (12/06)    Skin per nursing documentation: No pressure injuries noted.   Edema: +2 generalized, +3 BL arm/hand/foot/ankle/leg per chart.     Estimated Energy Needs: 1744 -2180kcal (20-25kcal/kg)  Estimated Protein Needs: 112-139g protein (1.6-2.0g/kg)  Defer fluids to team.   [place] State Equation: 1833kcal (12/06)  Needs based on lowest daily wt 87.2kg (12/01) for energy & IBW 69.8kg for protein.     Previous Nutrition Diagnosis: Severe acute malnutrition  Nutrition Diagnosis is: [x] ongoing  [] resolved [] not applicable     New Nutrition Diagnosis: [x] Not applicable    Nutrition Care Plan:  [x] In Progress: See recommendations.   [] Achieved  [] Not applicable    Nutrition Interventions:     Education Provided:       [] Yes:  [x] No: Not appropriate at this time, RD remains available for dietary education prn.     Recommendations:      1. When medically feasible, recommend reinitiate trickle feeds: Vital AF 1.2 @20mL/hr x24hr.  >> As tolerated & feasible, recommend advance to goal rate: Vital AF 1.2 @65mL/hr x24hr to provide 1560mL total volume, 1872kcal (21kcal/kg daily wt 87.2kg), 117g protein (1.7g/kg IBW 69.8kg), & 1265mL free water.   >>Defer free water flushes to team.   >>Monitor GI tolerance. RD remains available to adjust EN formula/rate/volume prn.   2. Recommend add multivitamin & thiamin pending no medical contraindications to help prevent micronutrient deficiencies.   4. Monitor weight trends, labs, & skin integrity. Replete electrolytes prn.   5. If unable to advance feeds, consider initiation of TPN. Pt with ongoing severe malnutrition.     RD remains available upon request and will follow up per protocol  Emmy Quinones, Dietetic Intern pager #[phone] Nutrition Follow Up Note  Patient seen for: Malnutrition initial follow-up. Chart reviewed, events noted.     Per chart, pt is 62M with PMH of CAD s/p 4v CABG ~2019 in [place], DM2 (A1c 7.3%), and HTN who initially presented to OSH with abdominal pain and n/v and found to have pancreatitis with lipase > 3000 though also with elevated troponins. CT abdomen revealed acute pancreatitis and his initial LVEF was 40-45%. He developed MSOF with hypoxic respiratory failure requiring intubation and ERIC and went into hypoxic PEA arrest requiring ACLS and due to persistent hypoxia and hypotension on pressors after ROSC was cannulated to [facility] ECM[facility] (LFV, RFA, no anterograde perfusion catheter) on 11/25. Notably CTH that day revealed small subdural hemorrhage. ARDS improving, off antiobiotics. Pt tolerating ECMO weans, awaiting LHC/IABP placement today 12/06. Renal function improving, on CVVHD today 12/06.       Source: [] Patient       [x] EMR        [] RN        [] Family at bedside       [] Other:    -If unable to interview patient: [x] Trach/Vent/BiPAP  [] Disoriented/confused/inappropriate to interview    Nutrition-Related Events:   - Pressors:  [x] Yes    [] No   - Propofol:  [x] Yes    [] No - note propofol held per flowsheets in preparation for procedure 12/06.         - Rate: 10 mL/hr. If maintained x 24 hours, propofol will provide: 264kcal.     Diet Order:   Diet, NPO after Midnight:      NPO Start Date: 05-Dec-2022,   NPO Start Time: 23:59 (12-05-22)  Diet, NPO with Tube Feed:   Tube Feeding Modality: Nasogastric  Vital 1.5 [place] (VITAL1.5RTH)  Total Volume for 24 Hours (mL): 480  Continuous  Starting Tube Feed Rate {mL per Hour}: 20  Until Goal Tube Feed Rate (mL per Hour): 20  Tube Feed Duration (in Hours): 24  Tube Feed Start Time: 11:20 (12-03-22)    EN Order Provides: 480mL total volume, 720kcal (8kcal/kg daily wt 12/1 87.2kg), 32g pro (0.5g/kg IBW 69.8kg), & 367mL fluid.   - 12/06: 80mL, NPO  - 11/05: 480mL  - 11/04: 480mL  - 11/03: 160mL  - 11/02: 0mL  Pt meeting <50% estimated nutrient needs x5 days per flowsheets.     Is current diet order appropriate/adequate? [] Yes  [x]  No: see recommendations    Nutrition-related concerns:  - Pt remains intubated, sedated on precedex & propofol.   - Currently NPO in preparation for procedure 12/06, otherwise previously tolerating EN.  - ARDS improving per team.    - Renal function improving with CVVHD, diuretics discontinued.  - Hypernatremia improved 12/06, Na WNL.  - On insulin drip for glycemic control.   - On antibiotics for pancreatitis, pancreatic enzymes downtrending 12/06.   - On IV Fluids: sodium chloride 0.9%   - Per chart, pt ordered for insulin regular infusion 100U, norepinephrine, miralax, & senna.    GI: No nausea/vomiting/diarrhea/constipation per chart. Last recorded BM 12/05. Bowel Regimen? [x] Yes   [] No    Weights:   Daily Weight (kg): 90.4 (12/06), 91.5 (12/05), 89.4 (12/04), 90.2 (12/03), 87.2 (12/01), 92.2 (11/30)  Dosing Weight (kg): 92.9 (11/24)  Wt changes likely due to combination of fluid shifts & inadequate energy intake. Note edema may be masking additional weight loss. RD to continue monitoring wts as able.     Pertinent Labs:   Lipase 737 <H> (12/06)  Amylase 356 <H> (12/06)    POCT -104 <H> (12/04-12/06)  HgbA1c 7.3% <H> (11/25)    BUN 61 <H> (12/06)  Cr 2.51 <H> (12/06)  eGFR 28 <L> (12/06)     <H> (12/06)  ALT 9 <L> (12/06)  AST 67 <H> (12/06)  Triglycerides 161 <H> (12/05)    Na 143 WNL (12/06)  K+ 4.0 WNL (12/06)  Mg 2.3 WNL (12/06)  Phos 4.6 <H> (12/06)    Skin per nursing documentation: No pressure injuries noted.   Edema: +2 generalized, +3 BL arm/hand/foot/ankle/leg per chart.     Estimated Energy Needs: 1744 -2180kcal (20-25kcal/kg)  Estimated Protein Needs: 112-139g protein (1.6-2.0g/kg)  Defer fluids to team.   [place] State Equation: 1833kcal (12/06)  Needs based on lowest daily wt 87.2kg (12/01) for energy & IBW 69.8kg for protein.     Previous Nutrition Diagnosis: Severe acute malnutrition  Nutrition Diagnosis is: [x] ongoing  [] resolved [] not applicable     New Nutrition Diagnosis: [x] Not applicable    Nutrition Care Plan:  [x] In Progress: See recommendations.   [] Achieved  [] Not applicable    Nutrition Interventions:     Education Provided:       [] Yes:  [x] No: Not appropriate at this time, RD remains available for dietary education prn.     Recommendations:      1. When medically feasible, recommend reinitiate trickle feeds: Vital AF 1.2 @20mL/hr x24hr.  >> As tolerated & feasible, recommend advance to goal rate: Vital AF 1.2 @65mL/hr x24hr to provide 1560mL total volume, 1872kcal (21kcal/kg daily wt 87.2kg), 117g protein (1.7g/kg IBW 69.8kg), & 1265mL free water.   >>Defer free water flushes to team.   >>Monitor GI tolerance. RD remains available to adjust EN formula/rate/volume prn.   2. Recommend add multivitamin & thiamin pending no medical contraindications to help prevent micronutrient deficiencies.   4. Monitor weight trends, labs, & skin integrity. Replete electrolytes prn.   5. If unable to advance feeds, consider initiation of TPN. Pt with ongoing severe malnutrition.     RD remains available upon request and will follow up per protocol  Emmy Quinones, Dietetic Intern pager #[phone] Nutrition Follow Up Note  Patient seen for: Malnutrition initial follow-up. Chart reviewed, events noted.     Per chart, pt is 62M with PMH of CAD s/p 4v CABG ~2019 in [place], DM2 (A1c 7.3%), and HTN who initially presented to OSH with abdominal pain and n/v and found to have pancreatitis with lipase > 3000 though also with elevated troponins. CT abdomen revealed acute pancreatitis and his initial LVEF was 40-45%. He developed MSOF with hypoxic respiratory failure requiring intubation and ERIC and went into hypoxic PEA arrest requiring ACLS and due to persistent hypoxia and hypotension on pressors after ROSC was cannulated to [facility] ECM[facility] (LFV, RFA, no anterograde perfusion catheter) on 11/25. Notably CTH that day revealed small subdural hemorrhage. ARDS improving, off antiobiotics. Pt tolerating ECMO weans, awaiting LHC/IABP placement today 12/06. Renal function improving, on CVVHD today 12/06.       Source: [] Patient       [x] EMR        [] RN        [] Family at bedside       [] Other:    -If unable to interview patient: [x] Trach/Vent/BiPAP  [] Disoriented/confused/inappropriate to interview    Nutrition-Related Events:   - Pressors:  [x] Yes    [] No   - Propofol:  [x] Yes    [] No - note propofol held per flowsheets in preparation for procedure 12/06.         - Rate: 10 mL/hr. If maintained x 24 hours, propofol will provide: 264kcal.     Diet Order:   Diet, NPO after Midnight:      NPO Start Date: 05-Dec-2022,   NPO Start Time: 23:59 (12-05-22)  Diet, NPO with Tube Feed:   Tube Feeding Modality: Nasogastric  Vital 1.5 [place] (VITAL1.5RTH)  Total Volume for 24 Hours (mL): 480  Continuous  Starting Tube Feed Rate {mL per Hour}: 20  Until Goal Tube Feed Rate (mL per Hour): 20  Tube Feed Duration (in Hours): 24  Tube Feed Start Time: 11:20 (12-03-22)    EN Order Provides: 480mL total volume, 720kcal (8kcal/kg daily wt 12/1 87.2kg), 32g pro (0.5g/kg IBW 69.8kg), & 367mL fluid.   - 12/06: 80mL, NPO  - 11/05: 480mL  - 11/04: 480mL  - 11/03: 160mL  - 11/02: 0mL  Pt meeting <50% estimated nutrient needs x5 days per flowsheets.     Is current diet order appropriate/adequate? [] Yes  [x]  No: see recommendations    Nutrition-related concerns:  - Pt remains intubated, sedated on precedex & propofol.   - Currently NPO in preparation for procedure 12/06, otherwise previously tolerating EN.  - ARDS improving per team.    - Renal function improving with CVVHD, diuretics discontinued.  - Hypernatremia improved 12/06, Na WNL.  - On insulin drip for glycemic control.   - On antibiotics for pancreatitis, pancreatic enzymes downtrending 12/06.   - On IV Fluids: sodium chloride 0.9%   - Per chart, pt ordered for insulin regular infusion 100U, norepinephrine, miralax, & senna.    GI: No nausea/vomiting/diarrhea/constipation per chart. Last recorded BM 12/05. Bowel Regimen? [x] Yes   [] No    Weights:   Daily Weight (kg): 90.4 (12/06), 91.5 (12/05), 89.4 (12/04), 90.2 (12/03), 87.2 (12/01), 92.2 (11/30)  Dosing Weight (kg): 92.9 (11/24)  Wt changes likely due to combination of fluid shifts & inadequate energy intake. Note edema may be masking additional weight loss. RD to continue monitoring wts as able.     Pertinent Labs:   Lipase 737 <H> (12/06)  Amylase 356 <H> (12/06)    POCT -104 <H> (12/04-12/06)  HgbA1c 7.3% <H> (11/25)    BUN 61 <H> (12/06)  Cr 2.51 <H> (12/06)  eGFR 28 <L> (12/06)     <H> (12/06)  ALT 9 <L> (12/06)  AST 67 <H> (12/06)  Triglycerides 161 <H> (12/05)    Na 143 WNL (12/06)  K+ 4.0 WNL (12/06)  Mg 2.3 WNL (12/06)  Phos 4.6 <H> (12/06)    Skin per nursing documentation: No pressure injuries noted.   Edema: +2 generalized, +3 BL arm/hand/foot/ankle/leg per chart.     Estimated Energy Needs: 1744 -2180kcal (20-25kcal/kg)  Estimated Protein Needs: 112-139g protein (1.6-2.0g/kg)  Defer fluids to team.   [place] State Equation: 1833kcal (12/06)  Needs based on lowest daily wt 87.2kg (12/01) for energy & IBW 69.8kg for protein.     Previous Nutrition Diagnosis: Severe acute malnutrition  Nutrition Diagnosis is: [x] ongoing  [] resolved [] not applicable     New Nutrition Diagnosis: [x] Not applicable    Nutrition Care Plan:  [x] In Progress: See recommendations.   [] Achieved  [] Not applicable    Nutrition Interventions:     Education Provided:       [] Yes:  [x] No: Not appropriate at this time, RD remains available for dietary education prn.     Recommendations:      1. When medically feasible, recommend reinitiate trickle feeds: Vital AF 1.2 @20mL/hr x24hr.  >> As tolerated & feasible, recommend advance to goal rate: Vital AF 1.2 @65mL/hr x24hr to provide 1560mL total volume, 1872kcal (21kcal/kg daily wt 87.2kg), 117g protein (1.7g/kg IBW 69.8kg), & 1265mL free water.   >>Defer free water flushes to team.   >>Monitor GI tolerance. RD remains available to adjust EN formula/rate/volume prn.   2. Recommend add multivitamin & thiamin pending no medical contraindications to help prevent micronutrient deficiencies.   4. Monitor weight trends, labs, & skin integrity. Replete electrolytes prn.   5. If unable to advance feeds, consider initiation of TPN. Pt with ongoing severe malnutrition.     RD remains available upon request and will follow up per protocol  Emmy Quinones, Dietetic Intern pager #[phone] Nutrition Follow Up Note  Patient seen for: Malnutrition initial follow-up. Chart reviewed, events noted.     Per chart, pt is 62M with PMH of CAD s/p 4v CABG ~2019 in [place], DM2 (A1c 7.3%), and HTN who initially presented to OSH with abdominal pain and n/v and found to have pancreatitis with lipase > 3000 though also with elevated troponins. CT abdomen revealed acute pancreatitis and his initial LVEF was 40-45%. He developed MSOF with hypoxic respiratory failure requiring intubation and ERIC and went into hypoxic PEA arrest requiring ACLS and due to persistent hypoxia and hypotension on pressors after ROSC was cannulated to [facility] ECMO (LFV, RFA, no anterograde perfusion catheter) on 11/25. Notably CTH that day revealed small subdural hemorrhage. ARDS improving, off antiobiotics. Pt tolerating ECMO weans, awaiting LHC/IABP placement today 12/06. Renal function improving, on CVVHD today 12/06.       Source: [] Patient       [x] EMR        [] RN        [] Family at bedside       [] Other:    -If unable to interview patient: [x] Trach/Vent/BiPAP  [] Disoriented/confused/inappropriate to interview    Nutrition-Related Events:   - Pressors:  [x] Yes    [] No   - Propofol:  [x] Yes    [] No - note propofol held per flowsheets in preparation for procedure 12/06.         - Rate: 10 mL/hr. If maintained x 24 hours, propofol will provide: 264kcal.     Diet Order:   Diet, NPO after Midnight:      NPO Start Date: 05-Dec-2022,   NPO Start Time: 23:59 (12-05-22)  Diet, NPO with Tube Feed:   Tube Feeding Modality: Nasogastric  Vital 1.5 [place] (VITAL1.5RTH)  Total Volume for 24 Hours (mL): 480  Continuous  Starting Tube Feed Rate {mL per Hour}: 20  Until Goal Tube Feed Rate (mL per Hour): 20  Tube Feed Duration (in Hours): 24  Tube Feed Start Time: 11:20 (12-03-22)    EN Order Provides: 480mL total volume, 720kcal (8kcal/kg daily wt 12/1 87.2kg), 32g pro (0.5g/kg IBW 69.8kg), & 367mL fluid.   - 12/06: 80mL, NPO  - 11/05: 480mL  - 11/04: 480mL  - 11/03: 160mL  - 11/02: 0mL  Pt meeting <50% estimated nutrient needs x5 days per flowsheets.     Is current diet order appropriate/adequate? [] Yes  [x]  No: see recommendations    Nutrition-related concerns:  - Pt remains intubated, sedated on precedex & propofol.   - Currently NPO in preparation for procedure 12/06, otherwise previously tolerating EN.  - ARDS improving per team.    - Renal function improving with CVVHD, diuretics discontinued.  - Hypernatremia improved 12/06, Na WNL.  - On insulin drip for glycemic control.   - On antibiotics for pancreatitis, pancreatic enzymes downtrending 12/06.   - On IV Fluids: sodium chloride 0.9%   - Per chart, pt ordered for insulin regular infusion 100U, norepinephrine, miralax, & senna.    GI: No nausea/vomiting/diarrhea/constipation per chart. Last recorded BM 12/05. Bowel Regimen? [x] Yes   [] No    Weights:   Daily Weight (kg): 90.4 (12/06), 91.5 (12/05), 89.4 (12/04), 90.2 (12/03), 87.2 (12/01), 92.2 (11/30)  Dosing Weight (kg): 92.9 (11/24)  Wt changes likely due to combination of fluid shifts & inadequate energy intake. Note edema may be masking additional weight loss. RD to continue monitoring wts as able.     Pertinent Labs:   Lipase 737 <H> (12/06)  Amylase 356 <H> (12/06)    POCT -104 <H> (12/04-12/06)  HgbA1c 7.3% <H> (11/25)    BUN 61 <H> (12/06)  Cr 2.51 <H> (12/06)  eGFR 28 <L> (12/06)     <H> (12/06)  ALT 9 <L> (12/06)  AST 67 <H> (12/06)  Triglycerides 161 <H> (12/05)    Na 143 WNL (12/06)  K+ 4.0 WNL (12/06)  Mg 2.3 WNL (12/06)  Phos 4.6 <H> (12/06)    Skin per nursing documentation: No pressure injuries noted.   Edema: +2 generalized, +3 BL arm/hand/foot/ankle/leg per chart.     Estimated Energy Needs: 1744 -2180kcal (20-25kcal/kg)  Estimated Protein Needs: 112-139g protein (1.6-2.0g/kg)  Defer fluids to team.   [place] State Equation: 1833kcal (12/06)  Needs based on lowest daily wt 87.2kg (12/01) for energy & IBW 69.8kg for protein.     Previous Nutrition Diagnosis: Severe acute malnutrition  Nutrition Diagnosis is: [x] ongoing  [] resolved [] not applicable   Goal: Pt to meet >80% nutrient needs via feasible route.     New Nutrition Diagnosis: [x] Not applicable    Nutrition Care Plan:  [x] In Progress: See recommendations.   [] Achieved  [] Not applicable    Nutrition Interventions:     Education Provided:       [] Yes:  [x] No: Not appropriate at this time, RD remains available for dietary education prn.     Recommendations:      1. When medically feasible, recommend reinitiate trickle feeds: Vital AF 1.2 @20mL/hr x24hr.  >> As tolerated & feasible, recommend advance to goal rate: Vital AF 1.2 @65mL/hr x24hr to provide 1560mL total volume, 1872kcal (21kcal/kg daily wt 87.2kg), 117g protein (1.7g/kg IBW 69.8kg), & 1265mL free water.   >>Defer free water flushes to team.   >>Monitor GI tolerance. RD remains available to adjust EN formula/rate/volume prn.   2. Recommend add multivitamin & thiamin pending no medical contraindications to help prevent micronutrient deficiencies.   4. Monitor weight trends, labs, & skin integrity. Replete electrolytes prn.   5. If unable to advance feeds, consider initiation of TPN. Pt with ongoing severe malnutrition.     RD remains available upon request and will follow up per protocol  Emmy Quinones, Dietetic Intern pager #[phone] Nutrition Follow Up Note  Patient seen for: Malnutrition initial follow-up. Chart reviewed, events noted.     Per chart, pt is 62M with PMH of CAD s/p 4v CABG ~2019 in [place], DM2 (A1c 7.3%), and HTN who initially presented to OSH with abdominal pain and n/v and found to have pancreatitis with lipase > 3000 though also with elevated troponins. CT abdomen revealed acute pancreatitis and his initial LVEF was 40-45%. He developed MSOF with hypoxic respiratory failure requiring intubation and ERIC and went into hypoxic PEA arrest requiring ACLS and due to persistent hypoxia and hypotension on pressors after ROSC was cannulated to [facility] ECMO (LFV, RFA, no anterograde perfusion catheter) on 11/25. Notably CTH that day revealed small subdural hemorrhage. ARDS improving, off antiobiotics. Pt tolerating ECMO weans, awaiting LHC/IABP placement today 12/06. Renal function improving, on CVVHD today 12/06.       Source: [] Patient       [x] EMR        [] RN        [] Family at bedside       [] Other:    -If unable to interview patient: [x] Trach/Vent/BiPAP  [] Disoriented/confused/inappropriate to interview    Nutrition-Related Events:   - Pressors:  [x] Yes    [] No   - Propofol:  [x] Yes    [] No - note propofol held per flowsheets in preparation for procedure 12/06.         - Rate: 10 mL/hr. If maintained x 24 hours, propofol will provide: 264kcal.     Diet Order:   Diet, NPO after Midnight:      NPO Start Date: 05-Dec-2022,   NPO Start Time: 23:59 (12-05-22)  Diet, NPO with Tube Feed:   Tube Feeding Modality: Nasogastric  Vital 1.5 [place] (VITAL1.5RTH)  Total Volume for 24 Hours (mL): 480  Continuous  Starting Tube Feed Rate {mL per Hour}: 20  Until Goal Tube Feed Rate (mL per Hour): 20  Tube Feed Duration (in Hours): 24  Tube Feed Start Time: 11:20 (12-03-22)    EN Order Provides: 480mL total volume, 720kcal (8kcal/kg daily wt 12/1 87.2kg), 32g pro (0.5g/kg IBW 69.8kg), & 367mL fluid.   - 12/06: 80mL, NPO  - 11/05: 480mL  - 11/04: 480mL  - 11/03: 160mL  - 11/02: 0mL  Pt meeting <50% estimated nutrient needs x5 days per flowsheets.     Is current diet order appropriate/adequate? [] Yes  [x]  No: see recommendations    Nutrition-related concerns:  - Pt remains intubated, sedated on precedex & propofol.   - Currently NPO in preparation for procedure 12/06, otherwise previously tolerating EN.  - ARDS improving per team.    - Renal function improving with CVVHD, diuretics discontinued.  - Hypernatremia improved 12/06, Na WNL.  - On insulin drip for glycemic control.   - On antibiotics for pancreatitis, pancreatic enzymes downtrending 12/06.   - On IV Fluids: sodium chloride 0.9%   - Per chart, pt ordered for insulin regular infusion 100U, norepinephrine, miralax, & senna.    GI: No nausea/vomiting/diarrhea/constipation per chart. Last recorded BM 12/05. Bowel Regimen? [x] Yes   [] No    Weights:   Daily Weight (kg): 90.4 (12/06), 91.5 (12/05), 89.4 (12/04), 90.2 (12/03), 87.2 (12/01), 92.2 (11/30)  Dosing Weight (kg): 92.9 (11/24)  Wt changes likely due to combination of fluid shifts & inadequate energy intake. Note edema may be masking additional weight loss. RD to continue monitoring wts as able.     Pertinent Labs:   Lipase 737 <H> (12/06)  Amylase 356 <H> (12/06)    POCT -104 <H> (12/04-12/06)  HgbA1c 7.3% <H> (11/25)    BUN 61 <H> (12/06)  Cr 2.51 <H> (12/06)  eGFR 28 <L> (12/06)     <H> (12/06)  ALT 9 <L> (12/06)  AST 67 <H> (12/06)  Triglycerides 161 <H> (12/05)    Na 143 WNL (12/06)  K+ 4.0 WNL (12/06)  Mg 2.3 WNL (12/06)  Phos 4.6 <H> (12/06)    Skin per nursing documentation: No pressure injuries noted.   Edema: +2 generalized, +3 BL arm/hand/foot/ankle/leg per chart.     Estimated Energy Needs: 1744 -2180kcal (20-25kcal/kg)  Estimated Protein Needs: 112-139g protein (1.6-2.0g/kg)  Defer fluids to team.   [place] State Equation: 1833kcal (12/06)  Needs based on lowest daily wt 87.2kg (12/01) for energy & IBW 69.8kg for protein.     Previous Nutrition Diagnosis: Severe acute malnutrition  Nutrition Diagnosis is: [x] ongoing  [] resolved [] not applicable   Goal: Pt to meet >80% nutrient needs via feasible route.     New Nutrition Diagnosis: [x] Not applicable    Nutrition Care Plan:  [x] In Progress: See recommendations.   [] Achieved  [] Not applicable    Nutrition Interventions:     Education Provided:       [] Yes:  [x] No: Not appropriate at this time, RD remains available for dietary education prn.     Recommendations:      1. When medically feasible, recommend reinitiate trickle feeds: Vital AF 1.2 @20mL/hr x24hr.  >> As tolerated & feasible, recommend advance to goal rate: Vital AF 1.2 @65mL/hr x24hr to provide 1560mL total volume, 1872kcal (21kcal/kg daily wt 87.2kg), 117g protein (1.7g/kg IBW 69.8kg), & 1265mL free water.   >>Defer free water flushes to team.   >>Monitor GI tolerance. RD remains available to adjust EN formula/rate/volume prn.   2. Recommend add multivitamin & thiamin pending no medical contraindications to help prevent micronutrient deficiencies.   4. Monitor weight trends, labs, & skin integrity. Replete electrolytes prn.   5. If unable to advance feeds, consider initiation of TPN. Pt with ongoing severe malnutrition.     RD remains available upon request and will follow up per protocol  Emmy Quinones, Dietetic Intern pager #[phone] Nutrition Follow Up Note  Patient seen for: Malnutrition initial follow-up. Chart reviewed, events noted.     Per chart, pt is 62M with PMH of CAD s/p 4v CABG ~2019 in [place], DM2 (A1c 7.3%), and HTN who initially presented to OSH with abdominal pain and n/v and found to have pancreatitis with lipase > 3000 though also with elevated troponins. CT abdomen revealed acute pancreatitis and his initial LVEF was 40-45%. He developed MSOF with hypoxic respiratory failure requiring intubation and ERIC and went into hypoxic PEA arrest requiring ACLS and due to persistent hypoxia and hypotension on pressors after ROSC was cannulated to [facility] (LFV, RFA, no anterograde perfusion catheter) on 11/25. Notably CTH that day revealed small subdural hemorrhage. ARDS improving, off antiobiotics. Pt tolerating ECMO weans, awaiting LHC/IABP placement today 12/06. Renal function improving, on CVVHD today 12/06.       Source: [] Patient       [x] EMR        [] RN        [] Family at bedside       [] Other:    -If unable to interview patient: [x] Trach/Vent/BiPAP  [] Disoriented/confused/inappropriate to interview    Nutrition-Related Events:   - Pressors:  [x] Yes    [] No   - Propofol:  [x] Yes    [] No - note propofol held per flowsheets in preparation for procedure 12/06.         - Rate: 10 mL/hr. If maintained x 24 hours, propofol will provide: 264kcal.     Diet Order:   Diet, NPO after Midnight:      NPO Start Date: 05-Dec-2022,   NPO Start Time: 23:59 (12-05-22)  Diet, NPO with Tube Feed:   Tube Feeding Modality: Nasogastric  Vital 1.5 [place] (VITAL1.5RTH)  Total Volume for 24 Hours (mL): 480  Continuous  Starting Tube Feed Rate {mL per Hour}: 20  Until Goal Tube Feed Rate (mL per Hour): 20  Tube Feed Duration (in Hours): 24  Tube Feed Start Time: 11:20 (12-03-22)    EN Order Provides: 480mL total volume, 720kcal (8kcal/kg daily wt 12/1 87.2kg), 32g pro (0.5g/kg IBW 69.8kg), & 367mL fluid.   - 12/06: 80mL, NPO  - 11/05: 480mL  - 11/04: 480mL  - 11/03: 160mL  - 11/02: 0mL  Pt meeting <50% estimated nutrient needs x5 days per flowsheets.     Is current diet order appropriate/adequate? [] Yes  [x]  No: see recommendations    Nutrition-related concerns:  - Pt remains intubated, sedated on precedex & propofol.   - Currently NPO in preparation for procedure 12/06, otherwise previously tolerating EN.  - ARDS improving per team.    - Renal function improving with CVVHD, diuretics discontinued.  - Hypernatremia improved 12/06, Na WNL.  - On insulin drip for glycemic control.   - On antibiotics for pancreatitis, pancreatic enzymes downtrending 12/06.   - On IV Fluids: sodium chloride 0.9%   - Per chart, pt ordered for insulin regular infusion 100U, norepinephrine, miralax, & senna.    GI: No nausea/vomiting/diarrhea/constipation per chart. Last recorded BM 12/05. Bowel Regimen? [x] Yes   [] No    Weights:   Daily Weight (kg): 90.4 (12/06), 91.5 (12/05), 89.4 (12/04), 90.2 (12/03), 87.2 (12/01), 92.2 (11/30)  Dosing Weight (kg): 92.9 (11/24)  Wt changes likely due to combination of fluid shifts & inadequate energy intake. Note edema may be masking additional weight loss. RD to continue monitoring wts as able.     Pertinent Labs:   Lipase 737 <H> (12/06)  Amylase 356 <H> (12/06)    POCT -104 <H> (12/04-12/06)  HgbA1c 7.3% <H> (11/25)    BUN 61 <H> (12/06)  Cr 2.51 <H> (12/06)  eGFR 28 <L> (12/06)     <H> (12/06)  ALT 9 <L> (12/06)  AST 67 <H> (12/06)  Triglycerides 161 <H> (12/05)    Na 143 WNL (12/06)  K+ 4.0 WNL (12/06)  Mg 2.3 WNL (12/06)  Phos 4.6 <H> (12/06)    Skin per nursing documentation: No pressure injuries noted.   Edema: +2 generalized, +3 BL arm/hand/foot/ankle/leg per chart.     Estimated Energy Needs: 1744 -2180kcal (20-25kcal/kg)  Estimated Protein Needs: 112-139g protein (1.6-2.0g/kg)  Defer fluids to team.   [place] State Equation: 1833kcal (12/06)  Needs based on lowest daily wt 87.2kg (12/01) for energy & IBW 69.8kg for protein.     Previous Nutrition Diagnosis: Severe acute malnutrition  Nutrition Diagnosis is: [x] ongoing  [] resolved [] not applicable   Goal: Pt to meet >80% nutrient needs via feasible route.     New Nutrition Diagnosis: [x] Not applicable    Nutrition Care Plan:  [x] In Progress: See recommendations.   [] Achieved  [] Not applicable    Nutrition Interventions:     Education Provided:       [] Yes:  [x] No: Not appropriate at this time, RD remains available for dietary education prn.     Recommendations:      1. When medically feasible, recommend reinitiate trickle feeds: Vital AF 1.2 @20mL/hr x24hr.  >> As tolerated & feasible, recommend advance to goal rate: Vital AF 1.2 @65mL/hr x24hr to provide 1560mL total volume, 1872kcal (21kcal/kg daily wt 87.2kg), 117g protein (1.7g/kg IBW 69.8kg), & 1265mL free water.   >>Defer free water flushes to team.   >>Monitor GI tolerance. RD remains available to adjust EN formula/rate/volume prn.   2. Recommend add multivitamin & thiamin pending no medical contraindications to help prevent micronutrient deficiencies.   4. Monitor weight trends, labs, & skin integrity. Replete electrolytes prn.   5. If unable to advance feeds, consider initiation of TPN. Pt with ongoing severe malnutrition.     RD remains available upon request and will follow up per protocol  Emmy Quinones, Dietetic Intern pager #[phone] Nutrition Follow Up Note  Patient seen for: Malnutrition initial follow-up. Chart reviewed, events noted.     Per chart, pt is 62M with PMH of CAD s/p 4v CABG ~2019 in [place], DM2 (A1c 7.3%), and HTN who initially presented to OSH with abdominal pain and n/v and found to have pancreatitis with lipase > 3000 though also with elevated troponins. CT abdomen revealed acute pancreatitis and his initial LVEF was 40-45%. He developed MSOF with hypoxic respiratory failure requiring intubation and ERIC and went into hypoxic PEA arrest requiring ACLS and due to persistent hypoxia and hypotension on pressors after ROSC was cannulated to [facility] (LFV, RFA, no anterograde perfusion catheter) on 11/25. Notably CTH that day revealed small subdural hemorrhage. ARDS improving, off antiobiotics. Pt tolerating ECMO weans, awaiting LHC/IABP placement today 12/06. Renal function improving, on CVVHD today 12/06.       Source: [] Patient       [x] EMR        [] RN        [] Family at bedside       [] Other:    -If unable to interview patient: [x] Trach/Vent/BiPAP  [] Disoriented/confused/inappropriate to interview    Nutrition-Related Events:   - Pressors:  [x] Yes    [] No          - Norepinephrine dose rate 0.05mcg/kg/min   - Propofol:  [x] Yes    [] No - note propofol held per flowsheets in preparation for procedure 12/06.         - Rate: 10 mL/hr. If maintained x 24 hours, propofol will provide: 264kcal.     Diet Order:   Diet, NPO after Midnight:      NPO Start Date: 05-Dec-2022,   NPO Start Time: 23:59 (12-05-22)  Diet, NPO with Tube Feed:   Tube Feeding Modality: Nasogastric  Vital 1.5 [place] (VITAL1.5RTH)  Total Volume for 24 Hours (mL): 480  Continuous  Starting Tube Feed Rate {mL per Hour}: 20  Until Goal Tube Feed Rate (mL per Hour): 20  Tube Feed Duration (in Hours): 24  Tube Feed Start Time: 11:20 (12-03-22)    EN Order Provides: 480mL total volume, 720kcal (8kcal/kg daily wt 12/1 87.2kg), 32g pro (0.5g/kg IBW 69.8kg), & 367mL fluid.   - 12/06: 80mL, NPO  - 11/05: 480mL  - 11/04: 480mL  - 11/03: 160mL  - 11/02: 0mL  Pt meeting <50% estimated nutrient needs x5 days per flowsheets.     Is current diet order appropriate/adequate? [] Yes  [x]  No: see recommendations    Nutrition-related concerns:  - Pt remains intubated, sedated on precedex & (off) propofol.   - Currently NPO in preparation for procedure 12/06, otherwise previously tolerating EN.  - ARDS improving per team.    - Renal function improving with CVVHD, diuretics discontinued.  - Hypernatremia improved 12/06, Na WNL.  - On insulin drip for glycemic control.   - On antibiotics for pancreatitis, pancreatic enzymes downtrending 12/06.   - On IV Fluids: sodium chloride 0.9% 5mL/hr     GI: No nausea/vomiting/diarrhea/constipation per chart. Last recorded BM 12/05. Bowel Regimen? [x] Yes   [] No    Weights:   Daily Weight (kg): 90.4 (12/06), 91.5 (12/05), 89.4 (12/04), 90.2 (12/03), 87.2 (12/01), 92.2 (11/30)  Dosing Weight (kg): 92.9 (11/24)  Wt changes likely due to combination of fluid shifts & inadequate energy intake. Note edema may be masking additional weight loss. RD to continue monitoring wts as able.     Nutritionally Pertinent Medications  Insulin regular infusion 100U  Norepinephrine  Miralax, & senna    Pertinent Labs:   Lipase 737 <H> (12/06)  Amylase 356 <H> (12/06)    POCT -104 <H> (12/04-12/06)  HgbA1c 7.3% <H> (11/25)    BUN 61 <H> (12/06)  Cr 2.51 <H> (12/06)  eGFR 28 <L> (12/06)     <H> (12/06)  ALT 9 <L> (12/06)  AST 67 <H> (12/06)  Triglycerides 161 <H> (12/05)    Na 143 WNL (12/06)  K+ 4.0 WNL (12/06)  Mg 2.3 WNL (12/06)  Phos 4.6 <H> (12/06)    Skin per nursing documentation: No pressure injuries noted.   Edema: +2 generalized, +3 bilateral arm/hand/foot/ankle/leg per chart.     Estimated Energy Needs: 1744 -2180kcal (20-25kcal/kg)  Estimated Protein Needs: 112-139g protein (1.6-2.0g/kg)  Defer fluids to team.   [place] State Equation: 1833kcal (12/06)  Needs based on lowest daily wt 87.2kg (12/01) for energy & IBW 69.8kg for protein.     Previous Nutrition Diagnosis: Severe acute malnutrition  Nutrition Diagnosis is: [x] ongoing  [] resolved [] not applicable   Goal: Pt to meet >80% nutrient needs via feasible route.     New Nutrition Diagnosis: [x] Not applicable    Nutrition Care Plan:  [x] In Progress: See recommendations.   [] Achieved  [] Not applicable    Nutrition Interventions:     Education Provided:       [] Yes:  [x] No: Not appropriate at this time, RD remains available for dietary education prn.     Recommendations:      1. When medically feasible, recommend reinitiate trickle feeds with formula changed to: Vital AF 1.2 @20mL/hr x24hr.  >> As tolerated & feasible, recommend advance to goal rate of 65mL/hr x24hr to provide 1560mL total volume, 1872kcal (21kcal/kg daily wt 87.2kg), 117g protein (1.7g/kg IBW 69.8kg), & 1265mL free water.   >>Defer free water flushes to team.   >>Monitor GI tolerance. RD remains available to adjust EN formula/rate/volume prn.   2. Recommend add multivitamin & thiamin pending no medical contraindications to help prevent micronutrient deficiencies.   3. Monitor weight trends, labs, & skin integrity. Replete electrolytes prn.   4. If unable to advance feeds, consider initiation of TPN. Pt with ongoing severe malnutrition.     RD remains available upon request and will follow up per protocol  Emmy Quinones, Dietetic Intern pager #[phone] Nutrition Follow Up Note  Patient seen for: Malnutrition initial follow-up. Chart reviewed, events noted.     Per chart, pt is 62M with PMH of CAD s/p 4v CABG ~2019 in [place], DM2 (A1c 7.3%), and HTN who initially presented to OSH with abdominal pain and n/v and found to have pancreatitis with lipase > 3000 though also with elevated troponins. CT abdomen revealed acute pancreatitis and his initial LVEF was 40-45%. He developed MSOF with hypoxic respiratory failure requiring intubation and ERIC and went into hypoxic PEA arrest requiring ACLS and due to persistent hypoxia and hypotension on pressors after ROSC was cannulated to [facility] (LFV, RFA, no anterograde perfusion catheter) on 11/25. Notably CTH that day revealed small subdural hemorrhage. ARDS improving, off antiobiotics. Pt tolerating ECMO weans, awaiting LHC/IABP placement today 12/06. Renal function improving, on CVVHD today 12/06.       Source: [] Patient       [x] EMR        [] RN        [] Family at bedside       [] Other:    -If unable to interview patient: [x] Trach/Vent/BiPAP  [] Disoriented/confused/inappropriate to interview    Nutrition-Related Events:   - Pressors:  [x] Yes    [] No          - Norepinephrine dose rate 0.05mcg/kg/min   - Propofol:  [x] Yes    [] No - note propofol held per flowsheets in preparation for procedure 12/06.         - Rate: 10 mL/hr. If maintained x 24 hours, propofol will provide: 264kcal.     Diet Order:   Diet, NPO after Midnight:      NPO Start Date: 05-Dec-2022,   NPO Start Time: 23:59 (12-05-22)  Diet, NPO with Tube Feed:   Tube Feeding Modality: Nasogastric  Vital 1.5 [place] (VITAL1.5RTH)  Total Volume for 24 Hours (mL): 480  Continuous  Starting Tube Feed Rate {mL per Hour}: 20  Until Goal Tube Feed Rate (mL per Hour): 20  Tube Feed Duration (in Hours): 24  Tube Feed Start Time: 11:20 (12-03-22)    EN Order Provides: 480mL total volume, 720kcal (8kcal/kg daily wt 12/1 87.2kg), 32g pro (0.5g/kg IBW 69.8kg), & 367mL fluid.   - 12/06: 80mL, NPO  - 11/05: 480mL  - 11/04: 480mL  - 11/03: 160mL  - 11/02: 0mL  Pt meeting <50% estimated nutrient needs x5 days per flowsheets.     Is current diet order appropriate/adequate? [] Yes  [x]  No: see recommendations    Nutrition-related concerns:  - Pt remains intubated, sedated on precedex & (off) propofol.   - Currently NPO in preparation for procedure 12/06, otherwise previously tolerating EN.  - ARDS improving per team.    - Renal function improving with CVVHD, diuretics discontinued.  - Hypernatremia improved 12/06, Na WNL.  - On insulin drip for glycemic control.   - On antibiotics for pancreatitis, pancreatic enzymes downtrending 12/06.   - On IV Fluids: sodium chloride 0.9% 5mL/hr     GI: No nausea/vomiting/diarrhea/constipation per chart. Last recorded BM 12/05. Bowel Regimen? [x] Yes   [] No    Weights:   Daily Weight (kg): 90.4 (12/06), 91.5 (12/05), 89.4 (12/04), 90.2 (12/03), 87.2 (12/01), 92.2 (11/30)  Dosing Weight (kg): 92.9 (11/24)  Wt changes likely due to combination of fluid shifts & inadequate energy intake. Note edema may be masking additional weight loss. RD to continue monitoring wts as able.     Nutritionally Pertinent Medications  Insulin regular infusion 100U  Norepinephrine  Miralax, & senna    Pertinent Labs:   Lipase 737 <H> (12/06)  Amylase 356 <H> (12/06)    POCT -104 <H> (12/04-12/06)  HgbA1c 7.3% <H> (11/25)    BUN 61 <H> (12/06)  Cr 2.51 <H> (12/06)  eGFR 28 <L> (12/06)     <H> (12/06)  ALT 9 <L> (12/06)  AST 67 <H> (12/06)  Triglycerides 161 <H> (12/05)    Na 143 WNL (12/06)  K+ 4.0 WNL (12/06)  Mg 2.3 WNL (12/06)  Phos 4.6 <H> (12/06)    Skin per nursing documentation: No pressure injuries noted.   Edema: +2 generalized, +3 bilateral arm/hand/foot/ankle/leg per chart.     Estimated Energy Needs: 1744 -2180kcal (20-25kcal/kg)  Estimated Protein Needs: 112-139g protein (1.6-2.0g/kg)  Defer fluids to team.   [place] State Equation: 1833kcal (12/06)  Needs based on lowest daily wt 87.2kg (12/01) for energy & IBW 69.8kg for protein.     Previous Nutrition Diagnosis: Severe acute malnutrition  Nutrition Diagnosis is: [x] ongoing  [] resolved [] not applicable   Goal: Pt to meet >80% nutrient needs via feasible route.     New Nutrition Diagnosis: [x] Not applicable    Nutrition Care Plan:  [x] In Progress: See recommendations.   [] Achieved  [] Not applicable    Nutrition Interventions:     Education Provided:       [] Yes:  [x] No: Not appropriate at this time, RD remains available for dietary education prn.     Recommendations:      1. When medically feasible, recommend reinitiate trickle feeds with formula changed to: Vital AF 1.2 @20mL/hr x24hr.  >> As tolerated & feasible, recommend advance to goal rate of 65mL/hr x24hr to provide 1560mL total volume, 1872kcal (21kcal/kg daily wt 87.2kg), 117g protein (1.7g/kg IBW 69.8kg), & 1265mL free water.   >>Defer free water flushes to team.   >>Monitor GI tolerance. RD remains available to adjust EN formula/rate/volume prn.   2. Recommend add multivitamin & thiamin pending no medical contraindications to help prevent micronutrient deficiencies.   3. Monitor weight trends, labs, & skin integrity. Replete electrolytes prn.   4. If unable to advance feeds, consider initiation of TPN. Pt with ongoing severe malnutrition.     RD remains available upon request and will follow up per protocol  Emmy Quinones, Dietetic Intern pager #[phone] Nutrition Follow Up Note  Patient seen for: Malnutrition initial follow-up. Chart reviewed, events noted.     Per chart, pt is 62M with PMH of CAD s/p 4v CABG ~2019 in [place], DM2 (A1c 7.3%), and HTN who initially presented to OSH with abdominal pain and n/v and found to have pancreatitis with lipase > 3000 though also with elevated troponins. CT abdomen revealed acute pancreatitis and his initial LVEF was 40-45%. He developed MSOF with hypoxic respiratory failure requiring intubation and ERIC and went into hypoxic PEA arrest requiring ACLS and due to persistent hypoxia and hypotension on pressors after ROSC was cannulated to [facility] ECMO (LFV, RFA, no anterograde perfusion catheter) on 11/25. Notably CTH that day revealed small subdural hemorrhage. ARDS improving, off antiobiotics. Pt tolerating ECMO weans, awaiting LHC/IABP placement today 12/06. Renal function improving, on CVVHD today 12/06.       Source: [] Patient       [x] EMR        [] RN        [] Family at bedside       [] Other:    -If unable to interview patient: [x] Trach/Vent/BiPAP  [] Disoriented/confused/inappropriate to interview    Nutrition-Related Events:   - Pressors:  [x] Yes    [] No          - Norepinephrine dose rate 0.05mcg/kg/min   - Propofol:  [x] Yes    [] No - note propofol held per flowsheets in preparation for procedure 12/06.         - Rate: 10 mL/hr. If maintained x 24 hours, propofol will provide: 264kcal.     Diet Order:   Diet, NPO after Midnight:      NPO Start Date: 05-Dec-2022,   NPO Start Time: 23:59 (12-05-22)  Diet, NPO with Tube Feed:   Tube Feeding Modality: Nasogastric  Vital 1.5 [place] (VITAL1.5RTH)  Total Volume for 24 Hours (mL): 480  Continuous  Starting Tube Feed Rate {mL per Hour}: 20  Until Goal Tube Feed Rate (mL per Hour): 20  Tube Feed Duration (in Hours): 24  Tube Feed Start Time: 11:20 (12-03-22)    EN Order Provides: 480mL total volume, 720kcal (8kcal/kg daily wt 12/1 87.2kg), 32g pro (0.5g/kg IBW 69.8kg), & 367mL fluid.   - 12/06: 80mL, NPO  - 11/05: 480mL  - 11/04: 480mL  - 11/03: 160mL  - 11/02: 0mL  Pt meeting <50% estimated nutrient needs x5 days per flowsheets.     Is current diet order appropriate/adequate? [] Yes  [x]  No: see recommendations    Nutrition-related concerns:  - Pt remains intubated, sedated on precedex & (off) propofol.   - Currently NPO in preparation for procedure 12/06, otherwise previously tolerating EN.  - ARDS improving per team.    - Renal function improving with CVVHD, diuretics discontinued.  - Hypernatremia improved 12/06, Na WNL.  - On insulin drip for glycemic control.   - On antibiotics for pancreatitis, pancreatic enzymes downtrending 12/06.   - On IV Fluids: sodium chloride 0.9% 5mL/hr     GI: No nausea/vomiting/diarrhea/constipation per chart. Last recorded BM 12/05. Bowel Regimen? [x] Yes   [] No    Weights:   Daily Weight (kg): 90.4 (12/06), 91.5 (12/05), 89.4 (12/04), 90.2 (12/03), 87.2 (12/01), 92.2 (11/30)  Dosing Weight (kg): 92.9 (11/24)  Wt changes likely due to combination of fluid shifts & inadequate energy intake. Note edema may be masking additional weight loss. RD to continue monitoring wts as able.     Nutritionally Pertinent Medications  Insulin regular infusion 100U  Norepinephrine  Miralax, & senna    Pertinent Labs:   Lipase 737 <H> (12/06)  Amylase 356 <H> (12/06)    POCT -104 <H> (12/04-12/06)  HgbA1c 7.3% <H> (11/25)    BUN 61 <H> (12/06)  Cr 2.51 <H> (12/06)  eGFR 28 <L> (12/06)     <H> (12/06)  ALT 9 <L> (12/06)  AST 67 <H> (12/06)  Triglycerides 161 <H> (12/05)    Na 143 WNL (12/06)  K+ 4.0 WNL (12/06)  Mg 2.3 WNL (12/06)  Phos 4.6 <H> (12/06)    Skin per nursing documentation: No pressure injuries noted.   Edema: +2 generalized, +3 bilateral arm/hand/foot/ankle/leg per chart.     Estimated Energy Needs: 1744 -2180kcal (20-25kcal/kg)  Estimated Protein Needs: 112-139g protein (1.6-2.0g/kg)  Defer fluids to team.   [place] State Equation: 1833kcal (12/06)  Needs based on lowest daily wt 87.2kg (12/01) for energy & IBW 69.8kg for protein.     Previous Nutrition Diagnosis: Severe acute malnutrition  Nutrition Diagnosis is: [x] ongoing  [] resolved [] not applicable   Goal: Pt to meet >80% nutrient needs via feasible route.     New Nutrition Diagnosis: [x] Not applicable    Nutrition Care Plan:  [x] In Progress: See recommendations.   [] Achieved  [] Not applicable    Nutrition Interventions:     Education Provided:       [] Yes:  [x] No: Not appropriate at this time, RD remains available for dietary education prn.     Recommendations:      1. When medically feasible, recommend reinitiate trickle feeds with formula changed to: Vital AF 1.2 @20mL/hr x24hr.  >> As tolerated & feasible, recommend advance to goal rate of 65mL/hr x24hr to provide 1560mL total volume, 1872kcal (21kcal/kg daily wt 87.2kg), 117g protein (1.7g/kg IBW 69.8kg), & 1265mL free water.   >>Defer free water flushes to team.   >>Monitor GI tolerance. RD remains available to adjust EN formula/rate/volume prn.   2. Recommend add multivitamin & thiamin pending no medical contraindications to help prevent micronutrient deficiencies.   3. Monitor weight trends, labs, & skin integrity. Replete electrolytes prn.   4. If unable to advance feeds, consider initiation of TPN. Pt with ongoing severe malnutrition.     RD remains available upon request and will follow up per protocol  Emmy Quinones, Dietetic Intern pager #[phone] Nutrition Follow Up Note  Patient seen for: Malnutrition initial follow-up. Chart reviewed, events noted.     Per chart, pt is 62M with PMH of CAD s/p 4v CABG ~2019 in [place], DM2 (A1c 7.3%), and HTN who initially presented to OSH with abdominal pain and n/v and found to have pancreatitis with lipase > 3000 though also with elevated troponins. CT abdomen revealed acute pancreatitis and his initial LVEF was 40-45%. He developed MSOF with hypoxic respiratory failure requiring intubation and ERIC and went into hypoxic PEA arrest requiring ACLS and due to persistent hypoxia and hypotension on pressors after ROSC was cannulated to [facility] (LFV, RFA, no anterograde perfusion catheter) on 11/25. Notably CTH that day revealed small subdural hemorrhage. ARDS improving, off antiobiotics. Pt tolerating ECMO weans, awaiting LHC/IABP placement today 12/06. Renal function improving, on CVVHD today 12/06.       Source: [] Patient       [x] EMR        [] RN        [] Family at bedside       [] Other:    -If unable to interview patient: [x] Trach/Vent/BiPAP  [] Disoriented/confused/inappropriate to interview    Nutrition-Related Events:   - Pressors:  [x] Yes    [] No          - Norepinephrine dose rate 0.05mcg/kg/min   - Propofol:  [x] Yes    [] No - note propofol held per flowsheets in preparation for procedure 12/06.         - Rate: 10 mL/hr. If maintained x 24 hours, propofol will provide: 264kcal.     Diet Order:   Diet, NPO after Midnight:      NPO Start Date: 05-Dec-2022,   NPO Start Time: 23:59 (12-05-22)  Diet, NPO with Tube Feed:   Tube Feeding Modality: Nasogastric  Vital 1.5 [place] (VITAL1.5RTH)  Total Volume for 24 Hours (mL): 480  Continuous  Starting Tube Feed Rate {mL per Hour}: 20  Until Goal Tube Feed Rate (mL per Hour): 20  Tube Feed Duration (in Hours): 24  Tube Feed Start Time: 11:20 (12-03-22)    EN Order Provides: 480mL total volume, 720kcal (8kcal/kg daily wt 12/1 87.2kg), 32g pro (0.5g/kg IBW 69.8kg), & 367mL fluid.   - 12/06: 80mL, NPO  - 11/05: 480mL  - 11/04: 480mL  - 11/03: 160mL  - 11/02: 0mL  Pt meeting <50% estimated nutrient needs x5 days per flowsheets.     Is current diet order appropriate/adequate? [] Yes  [x]  No: see recommendations    Nutrition-related concerns:  - Pt remains intubated, sedated on precedex & (off) propofol.   - Currently NPO in preparation for procedure 12/06, otherwise previously tolerating EN.  - ARDS improving per team.    - Renal function improving with CVVHD, diuretics discontinued.  - Hypernatremia improved 12/06, Na WNL.  - On insulin drip for glycemic control.   - On antibiotics for pancreatitis, pancreatic enzymes downtrending 12/06.   - On IV Fluids: sodium chloride 0.9% 5mL/hr     GI: No nausea/vomiting/diarrhea/constipation per chart. Last recorded BM 12/05. Bowel Regimen? [x] Yes   [] No    Weights:   Daily Weight (kg): 90.4 (12/06), 91.5 (12/05), 89.4 (12/04), 90.2 (12/03), 87.2 (12/01), 92.2 (11/30)  Dosing Weight (kg): 92.9 (11/24)  Wt changes likely due to combination of fluid shifts & inadequate energy intake. Note edema may be masking additional weight loss. RD to continue monitoring wts as able.     Nutritionally Pertinent Medications  Insulin regular infusion 100U  Norepinephrine  Miralax, & senna    Pertinent Labs:   Lipase 737 <H> (12/06)  Amylase 356 <H> (12/06)    POCT -104 <H> (12/04-12/06)  HgbA1c 7.3% <H> (11/25)    BUN 61 <H> (12/06)  Cr 2.51 <H> (12/06)  eGFR 28 <L> (12/06)     <H> (12/06)  ALT 9 <L> (12/06)  AST 67 <H> (12/06)  Triglycerides 161 <H> (12/05)    Na 143 WNL (12/06)  K+ 4.0 WNL (12/06)  Mg 2.3 WNL (12/06)  Phos 4.6 <H> (12/06)    Skin per nursing documentation: No pressure injuries noted.   Edema: +2 generalized, +3 bilateral arm/hand/foot/ankle/leg per chart.     Estimated Energy Needs: 1744 -2180kcal (20-25kcal/kg)  Estimated Protein Needs: 112-139g protein (1.6-2.0g/kg)  Defer fluids to team.   [place] State Equation: 1833kcal (12/06)  Needs based on lowest daily wt 87.2kg (12/01) for energy & IBW 69.8kg for protein.     Previous Nutrition Diagnosis: Severe acute malnutrition & Increased nutrient needs  Nutrition Diagnosis is: [x] ongoing  [] resolved [] not applicable   Goal: Pt to meet >80% nutrient needs via feasible route.     New Nutrition Diagnosis: [x] Not applicable    Nutrition Care Plan:  [x] In Progress: see recommendations.   [] Achieved  [] Not applicable    Nutrition Interventions:     Education Provided:       [] Yes:  [x] No: Not appropriate at this time, RD remains available for dietary education prn.     Recommendations:      1. When medically feasible, recommend reinitiate trickle feeds with formula changed to: Vital AF 1.2 @20mL/hr x24hr.  >> As tolerated & feasible, recommend advance to goal rate of 65mL/hr x24hr to provide 1560mL total volume, 1872kcal (21kcal/kg daily wt 87.2kg), 117g protein (1.7g/kg IBW 69.8kg), & 1265mL free water.   >>Defer free water flushes to team.   >>Monitor GI tolerance. RD remains available to adjust EN formula/rate/volume prn.   2. Recommend add multivitamin & thiamin pending no medical contraindications to help prevent micronutrient deficiencies.   3. Monitor weight trends, labs, & skin integrity. Replete electrolytes prn.   4. If unable to advance feeds, consider initiation of TPN. Pt with ongoing severe malnutrition.     RD remains available upon request and will follow up per protocol  Emmy Quinones, Dietetic Intern pager #[phone] Nutrition Follow Up Note  Patient seen for: Malnutrition initial follow-up. Chart reviewed, events noted.     Per chart, pt is 62M with PMH of CAD s/p 4v CABG ~2019 in [place], DM2 (A1c 7.3%), and HTN who initially presented to OSH with abdominal pain and n/v and found to have pancreatitis with lipase > 3000 though also with elevated troponins. CT abdomen revealed acute pancreatitis and his initial LVEF was 40-45%. He developed MSOF with hypoxic respiratory failure requiring intubation and ERIC and went into hypoxic PEA arrest requiring ACLS and due to persistent hypoxia and hypotension on pressors after ROSC was cannulated to [facility] (LFV, RFA, no anterograde perfusion catheter) on 11/25. Notably CTH that day revealed small subdural hemorrhage. ARDS improving, off antiobiotics. Pt tolerating ECMO weans, awaiting LHC/IABP placement today 12/06. Renal function improving, on CVVHD today 12/06.       Source: [] Patient       [x] EMR        [] RN        [] Family at bedside       [] Other:    -If unable to interview patient: [x] Trach/Vent/BiPAP  [] Disoriented/confused/inappropriate to interview    Nutrition-Related Events:   - Pressors:  [x] Yes    [] No          - Norepinephrine dose rate 0.05mcg/kg/min   - Propofol:  [x] Yes    [] No - note propofol held per flowsheets in preparation for procedure 12/06.         - Rate: 10 mL/hr. If maintained x 24 hours, propofol will provide: 264kcal.     Diet Order:   Diet, NPO after Midnight:      NPO Start Date: 05-Dec-2022,   NPO Start Time: 23:59 (12-05-22)  Diet, NPO with Tube Feed:   Tube Feeding Modality: Nasogastric  Vital 1.5 [place] (VITAL1.5RTH)  Total Volume for 24 Hours (mL): 480  Continuous  Starting Tube Feed Rate {mL per Hour}: 20  Until Goal Tube Feed Rate (mL per Hour): 20  Tube Feed Duration (in Hours): 24  Tube Feed Start Time: 11:20 (12-03-22)    EN Order Provides: 480mL total volume, 720kcal (8kcal/kg daily wt 12/1 87.2kg), 32g pro (0.5g/kg IBW 69.8kg), & 367mL fluid.   - 12/06: 80mL, NPO  - 11/05: 480mL  - 11/04: 480mL  - 11/03: 160mL  - 11/02: 0mL  Pt meeting <50% estimated nutrient needs x5 days per flowsheets.     Is current diet order appropriate/adequate? [] Yes  [x]  No: see recommendations    Nutrition-related concerns:  - Pt remains intubated, sedated on precedex & (off) propofol.   - Currently NPO in preparation for procedure 12/06, otherwise previously tolerating EN.  - ARDS improving per team.    - Renal function improving with CVVHD, diuretics discontinued.  - Hypernatremia improved 12/06, Na WNL.  - On insulin drip for glycemic control.   - On antibiotics for pancreatitis, pancreatic enzymes downtrending 12/06.   - On IV Fluids: sodium chloride 0.9% 5mL/hr     GI: No nausea/vomiting/diarrhea/constipation per chart. Last recorded BM 12/05. Bowel Regimen? [x] Yes   [] No    Weights:   Daily Weight (kg): 90.4 (12/06), 91.5 (12/05), 89.4 (12/04), 90.2 (12/03), 87.2 (12/01), 92.2 (11/30)  Dosing Weight (kg): 92.9 (11/24)  Wt changes likely due to combination of fluid shifts & inadequate energy intake. Note edema may be masking additional weight loss. RD to continue monitoring wts as able.     Nutritionally Pertinent Medications  Insulin regular infusion 100U  Norepinephrine  Miralax, & senna    Pertinent Labs:   Lipase 737 <H> (12/06)  Amylase 356 <H> (12/06)    POCT -104 <H> (12/04-12/06)  HgbA1c 7.3% <H> (11/25)    BUN 61 <H> (12/06)  Cr 2.51 <H> (12/06)  eGFR 28 <L> (12/06)     <H> (12/06)  ALT 9 <L> (12/06)  AST 67 <H> (12/06)  Triglycerides 161 <H> (12/05)    Na 143 WNL (12/06)  K+ 4.0 WNL (12/06)  Mg 2.3 WNL (12/06)  Phos 4.6 <H> (12/06)    Skin per nursing documentation: No pressure injuries noted.   Edema: +2 generalized, +3 bilateral arm/hand/foot/ankle/leg per chart.     Estimated Energy Needs: 1744 -2180kcal (20-25kcal/kg)  Estimated Protein Needs: 112-139g protein (1.6-2.0g/kg)  Defer fluids to team.   [place] State Equation: 1833kcal (12/06)  Needs based on lowest daily wt 87.2kg (12/01) for energy & IBW 69.8kg for protein.     Previous Nutrition Diagnosis: Severe acute malnutrition & Increased nutrient needs  Nutrition Diagnosis is: [x] ongoing  [] resolved [] not applicable   Goal: Pt to meet >80% nutrient needs via feasible route.     New Nutrition Diagnosis: [x] Not applicable    Nutrition Care Plan:  [x] In Progress: see recommendations.   [] Achieved  [] Not applicable    Nutrition Interventions:     Education Provided:       [] Yes:  [x] No: Not appropriate at this time, RD remains available for dietary education prn.     Recommendations:      1. When medically feasible, recommend reinitiate trickle feeds with formula changed to: Vital AF 1.2 @20mL/hr x24hr.  >> As tolerated & feasible, recommend advance to goal rate of 65mL/hr x24hr to provide 1560mL total volume, 1872kcal (21kcal/kg daily wt 87.2kg), 117g protein (1.7g/kg IBW 69.8kg), & 1265mL free water.   >>Defer free water flushes to team.   >>Monitor GI tolerance. RD remains available to adjust EN formula/rate/volume prn.   2. Recommend add multivitamin & thiamin pending no medical contraindications to help prevent micronutrient deficiencies.   3. Monitor weight trends, labs, & skin integrity. Replete electrolytes prn.   4. If unable to advance feeds, consider initiation of TPN. Pt with ongoing severe malnutrition.     RD remains available upon request and will follow up per protocol  Emmy Quinones, Dietetic Intern pager #[phone] Nutrition Follow Up Note  Patient seen for: Malnutrition initial follow-up. Chart reviewed, events noted.     Per chart, pt is 62M with PMH of CAD s/p 4v CABG ~2019 in [place], DM2 (A1c 7.3%), and HTN who initially presented to OSH with abdominal pain and n/v and found to have pancreatitis with lipase > 3000 though also with elevated troponins. CT abdomen revealed acute pancreatitis and his initial LVEF was 40-45%. He developed MSOF with hypoxic respiratory failure requiring intubation and ERIC and went into hypoxic PEA arrest requiring ACLS and due to persistent hypoxia and hypotension on pressors after ROSC was cannulated to [facility] (LFV, RFA, no anterograde perfusion catheter) on 11/25. Notably CTH that day revealed small subdural hemorrhage. ARDS improving, off antiobiotics. Pt tolerating ECMO weans, awaiting LHC/IABP placement today 12/06. Renal function improving, on CVVHD today 12/06.       Source: [] Patient       [x] EMR        [] RN        [] Family at bedside       [] Other:    -If unable to interview patient: [x] Trach/Vent/BiPAP  [] Disoriented/confused/inappropriate to interview    Nutrition-Related Events:   - Pressors:  [x] Yes    [] No          - Norepinephrine dose rate 0.05mcg/kg/min   - Propofol:  [x] Yes    [] No - note propofol held per flowsheets in preparation for procedure 12/06.         - Rate: 10 mL/hr. If maintained x 24 hours, propofol will provide: 264kcal.     Diet Order:   Diet, NPO after Midnight:      NPO Start Date: 05-Dec-2022,   NPO Start Time: 23:59 (12-05-22)  Diet, NPO with Tube Feed:   Tube Feeding Modality: Nasogastric  Vital 1.5 [place] (VITAL1.5RTH)  Total Volume for 24 Hours (mL): 480  Continuous  Starting Tube Feed Rate {mL per Hour}: 20  Until Goal Tube Feed Rate (mL per Hour): 20  Tube Feed Duration (in Hours): 24  Tube Feed Start Time: 11:20 (12-03-22)    EN Order Provides: 480mL total volume, 720kcal (8kcal/kg daily wt 12/1 87.2kg), 32g pro (0.5g/kg IBW 69.8kg), & 367mL fluid.   - 12/06: 80mL, NPO  - 11/05: 480mL  - 11/04: 480mL  - 11/03: 160mL  - 11/02: 0mL  Pt meeting <50% estimated nutrient needs x5 days per flowsheets.     Is current diet order appropriate/adequate? [] Yes  [x]  No: see recommendations    Nutrition-related concerns:  - Pt remains intubated, sedated on precedex & (off) propofol.   - Currently NPO in preparation for procedure 12/06, otherwise previously tolerating EN.  - ARDS improving per team.    - Renal function improving with CVVHD, diuretics discontinued.  - Hypernatremia improved 12/06, Na WNL.  - On insulin drip for glycemic control.   - On antibiotics for pancreatitis, pancreatic enzymes downtrending 12/06.   - On IV Fluids: sodium chloride 0.9% 5mL/hr     GI: No nausea/vomiting/diarrhea/constipation per chart. Last recorded BM 12/05. Bowel Regimen? [x] Yes   [] No    Weights:   Daily Weight (kg): 90.4 (12/06), 91.5 (12/05), 89.4 (12/04), 90.2 (12/03), 87.2 (12/01), 92.2 (11/30)  Dosing Weight (kg): 92.9 (11/24)  Wt changes likely due to combination of fluid shifts & inadequate energy intake. Note edema may be masking additional weight loss. RD to continue monitoring wts as able.     Nutritionally Pertinent Medications  Insulin regular infusion 100U  Norepinephrine  Miralax, & senna    Pertinent Labs:   Lipase 737 <H> (12/06)  Amylase 356 <H> (12/06)    POCT -104 <H> (12/04-12/06)  HgbA1c 7.3% <H> (11/25)    BUN 61 <H> (12/06)  Cr 2.51 <H> (12/06)  eGFR 28 <L> (12/06)     <H> (12/06)  ALT 9 <L> (12/06)  AST 67 <H> (12/06)  Triglycerides 161 <H> (12/05)    Na 143 WNL (12/06)  K+ 4.0 WNL (12/06)  Mg 2.3 WNL (12/06)  Phos 4.6 <H> (12/06)    Skin per nursing documentation: No pressure injuries noted.   Edema: +2 generalized, +3 bilateral arm/hand/foot/ankle/leg per chart.     Estimated Energy Needs: 1744 -2180kcal (20-25kcal/kg)  Estimated Protein Needs: 112-139g protein (1.6-2.0g/kg)  Defer fluids to team.   [place] State Equation: 1833kcal (12/06)  Needs based on lowest daily wt 87.2kg (12/01) for energy & IBW 69.8kg for protein.     Previous Nutrition Diagnosis: Severe acute malnutrition & Increased nutrient needs  Nutrition Diagnosis is: [x] ongoing  [] resolved [] not applicable   Goal: Pt to meet >80% nutrient needs via feasible route.     New Nutrition Diagnosis: [x] Not applicable    Nutrition Care Plan:  [x] In Progress: see recommendations.   [] Achieved  [] Not applicable    Nutrition Interventions:     Education Provided:       [] Yes:  [x] No: Not appropriate at this time, RD remains available for dietary education prn.     Recommendations:      1. When medically feasible, recommend reinitiate trickle feeds with formula changed to: Vital AF 1.2 @20mL/hr x24hr.  >> As tolerated & feasible, recommend advance to goal rate of 65mL/hr x24hr to provide 1560mL total volume, 1872kcal (21kcal/kg daily wt 87.2kg), 117g protein (1.7g/kg IBW 69.8kg), & 1265mL free water.   >>Defer free water flushes to team.   >>Monitor GI tolerance. RD remains available to adjust EN formula/rate/volume prn.   2. Recommend add multivitamin & thiamin pending no medical contraindications to help prevent micronutrient deficiencies.   3. Monitor weight trends, labs, & skin integrity. Replete electrolytes prn.   4. If unable to advance feeds, consider initiation of TPN. Pt with ongoing severe malnutrition.     RD remains available upon request and will follow up per protocol  Emmy Quinones, Dietetic Intern pager #[phone] Nutrition Follow Up Note  Patient seen for: Malnutrition initial follow-up. Chart reviewed, events noted.     Per chart, pt is 62M with PMH of CAD s/p 4v CABG ~2019 in [place], DM2 (A1c 7.3%), and HTN who initially presented to OSH with abdominal pain and n/v and found to have pancreatitis with lipase > 3000 though also with elevated troponins. CT abdomen revealed acute pancreatitis and his initial LVEF was 40-45%. He developed MSOF with hypoxic respiratory failure requiring intubation and ERIC and went into hypoxic PEA arrest requiring ACLS and due to persistent hypoxia and hypotension on pressors after ROSC was cannulated to [facility] ECM[facility] (LFV, RFA, no anterograde perfusion catheter) on 11/25. Notably CTH that day revealed small subdural hemorrhage. ARDS improving, off antiobiotics. Pt tolerating ECMO weans, awaiting LHC/IABP placement today 12/06. Renal function improving, on CVVHD today 12/06.       Source: [] Patient       [x] EMR        [] RN        [] Family at bedside       [] Other:    -If unable to interview patient: [x] Trach/Vent/BiPAP  [] Disoriented/confused/inappropriate to interview    Nutrition-Related Events:   - Pressors:  [x] Yes    [] No          - Norepinephrine dose rate 0.05mcg/kg/min   - Propofol:  [x] Yes    [] No - note propofol held per flowsheets in preparation for procedure 12/06.         - Rate: 10 mL/hr. If maintained x 24 hours, propofol will provide: 264kcal.     Diet Order:   Diet, NPO after Midnight:      NPO Start Date: 05-Dec-2022,   NPO Start Time: 23:59 (12-05-22)  Diet, NPO with Tube Feed:   Tube Feeding Modality: Nasogastric  Vital 1.5 [place] (VITAL1.5RTH)  Total Volume for 24 Hours (mL): 480  Continuous  Starting Tube Feed Rate {mL per Hour}: 20  Until Goal Tube Feed Rate (mL per Hour): 20  Tube Feed Duration (in Hours): 24  Tube Feed Start Time: 11:20 (12-03-22)    EN Order Provides: 480mL total volume, 720kcal (8kcal/kg daily wt 12/1 87.2kg), 32g pro (0.5g/kg IBW 69.8kg), & 367mL fluid.   - 12/06: NPO  - 12/05: 400mL  - 12/04: 480mL  - 12/03: 320mL  - 12/02: 0mL    Pt meeting <50% estimated nutrient needs x5 days per flowsheets.     Is current diet order appropriate/adequate? [] Yes  [x]  No: see recommendations    Nutrition-related concerns:  - Pt remains intubated, sedated on precedex & (off) propofol.   - Currently NPO in preparation for procedure 12/06, otherwise previously tolerating EN.  - ARDS improving per team.    - Renal function improving with CVVHD, diuretics discontinued.  - Hypernatremia improved 12/06, Na WNL.  - On insulin drip for glycemic control.   - On antibiotics for pancreatitis, pancreatic enzymes downtrending 12/06.   - On IV Fluids: sodium chloride 0.9% 5mL/hr     GI: No nausea/vomiting/diarrhea/constipation per chart. Last recorded BM 12/05. Bowel Regimen? [x] Yes   [] No    Weights:   Daily Weight (kg): 90.4 (12/06), 91.5 (12/05), 89.4 (12/04), 90.2 (12/03), 87.2 (12/01), 92.2 (11/30)  Dosing Weight (kg): 92.9 (11/24)  Wt changes likely due to combination of fluid shifts & inadequate energy intake. Note edema may be masking additional weight loss. RD to continue monitoring wts as able.     Nutritionally Pertinent Medications  Insulin regular infusion 100U  Norepinephrine  Miralax, & senna    Pertinent Labs:   Lipase 737 <H> (12/06)  Amylase 356 <H> (12/06)    POCT -104 <H> (12/04-12/06)  HgbA1c 7.3% <H> (11/25)    BUN 61 <H> (12/06)  Cr 2.51 <H> (12/06)  eGFR 28 <L> (12/06)     <H> (12/06)  ALT 9 <L> (12/06)  AST 67 <H> (12/06)  Triglycerides 161 <H> (12/05)    Na 143 WNL (12/06)  K+ 4.0 WNL (12/06)  Mg 2.3 WNL (12/06)  Phos 4.6 <H> (12/06)    Skin per nursing documentation: No pressure injuries noted.   Edema: +2 generalized, +3 bilateral arm/hand/foot/ankle/leg per chart.     Estimated Energy Needs: 1744 -2180kcal (20-25kcal/kg)  Estimated Protein Needs: 112-139g protein (1.6-2.0g/kg)  Defer fluids to team.   [place] State Equation: 1833kcal (12/06)  Needs based on lowest daily wt 87.2kg (12/01) for energy & IBW 69.8kg for protein.     Previous Nutrition Diagnosis: Severe acute malnutrition & Increased nutrient needs  Nutrition Diagnosis is: [x] ongoing  [] resolved [] not applicable   Goal: Pt to meet >80% nutrient needs via feasible route.     New Nutrition Diagnosis: [x] Not applicable    Nutrition Care Plan:  [x] In Progress: see recommendations.   [] Achieved  [] Not applicable    Nutrition Interventions:     Education Provided:       [] Yes:  [x] No: Not appropriate at this time, RD remains available for dietary education prn.     Recommendations:      1. When medically feasible, recommend reinitiate trickle feeds with formula changed to: Vital AF 1.2 @20mL/hr x24hr.  >> As tolerated & feasible, recommend advance to goal rate of 65mL/hr x24hr to provide 1560mL total volume, 1872kcal (21kcal/kg daily wt 87.2kg), 117g protein (1.7g/kg IBW 69.8kg), & 1265mL free water.   >>Defer free water flushes to team.   >>Monitor GI tolerance. RD remains available to adjust EN formula/rate/volume prn.   2. Recommend add multivitamin & thiamin pending no medical contraindications to help prevent micronutrient deficiencies.   3. Monitor weight trends, labs, & skin integrity. Replete electrolytes prn.   4. If unable to advance feeds, consider initiation of TPN. Pt with ongoing severe malnutrition.     RD remains available upon request and will follow up per protocol  Emmy Quinones, Dietetic Intern pager #[phone] Nutrition Follow Up Note  Patient seen for: Malnutrition initial follow-up. Chart reviewed, events noted.     Per chart, pt is 62M with PMH of CAD s/p 4v CABG ~2019 in [place], DM2 (A1c 7.3%), and HTN who initially presented to OSH with abdominal pain and n/v and found to have pancreatitis with lipase > 3000 though also with elevated troponins. CT abdomen revealed acute pancreatitis and his initial LVEF was 40-45%. He developed MSOF with hypoxic respiratory failure requiring intubation and EIRC and went into hypoxic PEA arrest requiring ACLS and due to persistent hypoxia and hypotension on pressors after ROSC was cannulated to [facility] (LFV, RFA, no anterograde perfusion catheter) on 11/25. Notably CTH that day revealed small subdural hemorrhage. ARDS improving, off antiobiotics. Pt tolerating ECMO weans, awaiting LHC/IABP placement today 12/06. Renal function improving, on CVVHD today 12/06.       Source: [] Patient       [x] EMR        [] RN        [] Family at bedside       [] Other:    -If unable to interview patient: [x] Trach/Vent/BiPAP  [] Disoriented/confused/inappropriate to interview    Nutrition-Related Events:   - Pressors:  [x] Yes    [] No          - Norepinephrine dose rate 0.05mcg/kg/min   - Propofol:  [x] Yes    [] No - note propofol held per flowsheets in preparation for procedure 12/06.         - Rate: 10 mL/hr. If maintained x 24 hours, propofol will provide: 264kcal.     Diet Order:   Diet, NPO after Midnight:      NPO Start Date: 05-Dec-2022,   NPO Start Time: 23:59 (12-05-22)  Diet, NPO with Tube Feed:   Tube Feeding Modality: Nasogastric  Vital 1.5 [place] (VITAL1.5RTH)  Total Volume for 24 Hours (mL): 480  Continuous  Starting Tube Feed Rate {mL per Hour}: 20  Until Goal Tube Feed Rate (mL per Hour): 20  Tube Feed Duration (in Hours): 24  Tube Feed Start Time: 11:20 (12-03-22)    EN Order Provides: 480mL total volume, 720kcal (8kcal/kg daily wt 12/1 87.2kg), 32g pro (0.5g/kg IBW 69.8kg), & 367mL fluid.   - 12/06: NPO  - 12/05: 400mL  - 12/04: 480mL  - 12/03: 320mL  - 12/02: 0mL    Pt meeting <50% estimated nutrient needs x5 days per flowsheets.     Is current diet order appropriate/adequate? [] Yes  [x]  No: see recommendations    Nutrition-related concerns:  - Pt remains intubated, sedated on precedex & (off) propofol.   - Currently NPO in preparation for procedure 12/06, otherwise previously tolerating EN.  - ARDS improving per team.    - Renal function improving with CVVHD, diuretics discontinued.  - Hypernatremia improved 12/06, Na WNL.  - On insulin drip for glycemic control.   - On antibiotics for pancreatitis, pancreatic enzymes downtrending 12/06.   - On IV Fluids: sodium chloride 0.9% 5mL/hr     GI: No nausea/vomiting/diarrhea/constipation per chart. Last recorded BM 12/05. Bowel Regimen? [x] Yes   [] No    Weights:   Daily Weight (kg): 90.4 (12/06), 91.5 (12/05), 89.4 (12/04), 90.2 (12/03), 87.2 (12/01), 92.2 (11/30)  Dosing Weight (kg): 92.9 (11/24)  Wt changes likely due to combination of fluid shifts & inadequate energy intake. Note edema may be masking additional weight loss. RD to continue monitoring wts as able.     Nutritionally Pertinent Medications  Insulin regular infusion 100U  Norepinephrine  Miralax, & senna    Pertinent Labs:   Lipase 737 <H> (12/06)  Amylase 356 <H> (12/06)    POCT -104 <H> (12/04-12/06)  HgbA1c 7.3% <H> (11/25)    BUN 61 <H> (12/06)  Cr 2.51 <H> (12/06)  eGFR 28 <L> (12/06)     <H> (12/06)  ALT 9 <L> (12/06)  AST 67 <H> (12/06)  Triglycerides 161 <H> (12/05)    Na 143 WNL (12/06)  K+ 4.0 WNL (12/06)  Mg 2.3 WNL (12/06)  Phos 4.6 <H> (12/06)    Skin per nursing documentation: No pressure injuries noted.   Edema: +2 generalized, +3 bilateral arm/hand/foot/ankle/leg per chart.     Estimated Energy Needs: 1744 -2180kcal (20-25kcal/kg)  Estimated Protein Needs: 112-139g protein (1.6-2.0g/kg)  Defer fluids to team.   [place] State Equation: 1833kcal (12/06)  Needs based on lowest daily wt 87.2kg (12/01) for energy & IBW 69.8kg for protein.     Previous Nutrition Diagnosis: Severe acute malnutrition & Increased nutrient needs  Nutrition Diagnosis is: [x] ongoing  [] resolved [] not applicable   Goal: Pt to meet >80% nutrient needs via feasible route.     New Nutrition Diagnosis: [x] Not applicable    Nutrition Care Plan:  [x] In Progress: see recommendations.   [] Achieved  [] Not applicable    Nutrition Interventions:     Education Provided:       [] Yes:  [x] No: Not appropriate at this time, RD remains available for dietary education prn.     Recommendations:      1. When medically feasible, recommend reinitiate trickle feeds with formula changed to: Vital AF 1.2 @20mL/hr x24hr.  >> As tolerated & feasible, recommend advance to goal rate of 65mL/hr x24hr to provide 1560mL total volume, 1872kcal (21kcal/kg daily wt 87.2kg), 117g protein (1.7g/kg IBW 69.8kg), & 1265mL free water.   >>Defer free water flushes to team.   >>Monitor GI tolerance. RD remains available to adjust EN formula/rate/volume prn.   2. Recommend add multivitamin & thiamin pending no medical contraindications to help prevent micronutrient deficiencies.   3. Monitor weight trends, labs, & skin integrity. Replete electrolytes prn.   4. If unable to advance feeds, consider initiation of TPN. Pt with ongoing severe malnutrition.     RD remains available upon request and will follow up per protocol  Emmy Quinones, Dietetic Intern pager #[phone] Nutrition Follow Up Note  Patient seen for: Malnutrition initial follow-up. Chart reviewed, events noted.     Per chart, pt is 62M with PMH of CAD s/p 4v CABG ~2019 in [place], DM2 (A1c 7.3%), and HTN who initially presented to OSH with abdominal pain and n/v and found to have pancreatitis with lipase > 3000 though also with elevated troponins. CT abdomen revealed acute pancreatitis and his initial LVEF was 40-45%. He developed MSOF with hypoxic respiratory failure requiring intubation and ERIC and went into hypoxic PEA arrest requiring ACLS and due to persistent hypoxia and hypotension on pressors after ROSC was cannulated to [facility] ECMO (LFV, RFA, no anterograde perfusion catheter) on 11/25. Notably CTH that day revealed small subdural hemorrhage. ARDS improving, off antiobiotics. Pt tolerating ECMO weans, awaiting LHC/IABP placement today 12/06. Renal function improving, on CVVHD today 12/06.       Source: [] Patient       [x] EMR        [] RN        [] Family at bedside       [] Other:    -If unable to interview patient: [x] Trach/Vent/BiPAP  [] Disoriented/confused/inappropriate to interview    Nutrition-Related Events:   - Pressors:  [x] Yes    [] No          - Norepinephrine dose rate 0.05mcg/kg/min   - Propofol:  [x] Yes    [] No - note propofol held per flowsheets in preparation for procedure 12/06.         - Rate: 10 mL/hr. If maintained x 24 hours, propofol will provide: 264kcal.     Diet Order:   Diet, NPO after Midnight:      NPO Start Date: 05-Dec-2022,   NPO Start Time: 23:59 (12-05-22)  Diet, NPO with Tube Feed:   Tube Feeding Modality: Nasogastric  Vital 1.5 [place] (VITAL1.5RTH)  Total Volume for 24 Hours (mL): 480  Continuous  Starting Tube Feed Rate {mL per Hour}: 20  Until Goal Tube Feed Rate (mL per Hour): 20  Tube Feed Duration (in Hours): 24  Tube Feed Start Time: 11:20 (12-03-22)    EN Order Provides: 480mL total volume, 720kcal (8kcal/kg daily wt 12/1 87.2kg), 32g pro (0.5g/kg IBW 69.8kg), & 367mL fluid.   - 12/06: NPO  - 12/05: 400mL  - 12/04: 480mL  - 12/03: 320mL  - 12/02: 0mL    Pt meeting <50% estimated nutrient needs x5 days per flowsheets.     Is current diet order appropriate/adequate? [] Yes  [x]  No: see recommendations    Nutrition-related concerns:  - Pt remains intubated, sedated on precedex & (off) propofol.   - Currently NPO in preparation for procedure 12/06, otherwise previously tolerating EN.  - ARDS improving per team.    - Renal function improving with CVVHD, diuretics discontinued.  - Hypernatremia improved 12/06, Na WNL.  - On insulin drip for glycemic control.   - On antibiotics for pancreatitis, pancreatic enzymes downtrending 12/06.   - On IV Fluids: sodium chloride 0.9% 5mL/hr     GI: No nausea/vomiting/diarrhea/constipation per chart. Last recorded BM 12/05. Bowel Regimen? [x] Yes   [] No    Weights:   Daily Weight (kg): 90.4 (12/06), 91.5 (12/05), 89.4 (12/04), 90.2 (12/03), 87.2 (12/01), 92.2 (11/30)  Dosing Weight (kg): 92.9 (11/24)  Wt changes likely due to combination of fluid shifts & inadequate energy intake. Note edema may be masking additional weight loss. RD to continue monitoring wts as able.     Nutritionally Pertinent Medications  Insulin regular infusion 100U  Norepinephrine  Miralax, & senna    Pertinent Labs:   Lipase 737 <H> (12/06)  Amylase 356 <H> (12/06)    POCT -104 <H> (12/04-12/06)  HgbA1c 7.3% <H> (11/25)    BUN 61 <H> (12/06)  Cr 2.51 <H> (12/06)  eGFR 28 <L> (12/06)     <H> (12/06)  ALT 9 <L> (12/06)  AST 67 <H> (12/06)  Triglycerides 161 <H> (12/05)    Na 143 WNL (12/06)  K+ 4.0 WNL (12/06)  Mg 2.3 WNL (12/06)  Phos 4.6 <H> (12/06)    Skin per nursing documentation: No pressure injuries noted.   Edema: +2 generalized, +3 bilateral arm/hand/foot/ankle/leg per chart.     Estimated Energy Needs: 1744 -2180kcal (20-25kcal/kg)  Estimated Protein Needs: 112-139g protein (1.6-2.0g/kg)  Defer fluids to team.   [place] State Equation: 1833kcal (12/06)  Needs based on lowest daily wt 87.2kg (12/01) for energy & IBW 69.8kg for protein.     Previous Nutrition Diagnosis: Severe acute malnutrition & Increased nutrient needs  Nutrition Diagnosis is: [x] ongoing  [] resolved [] not applicable   Goal: Pt to meet >80% nutrient needs via feasible route.     New Nutrition Diagnosis: [x] Not applicable    Nutrition Care Plan:  [x] In Progress: see recommendations.   [] Achieved  [] Not applicable    Nutrition Interventions:     Education Provided:       [] Yes:  [x] No: Not appropriate at this time, RD remains available for dietary education prn.     Recommendations:      1. When medically feasible, recommend reinitiate trickle feeds with formula changed to: Vital AF 1.2 @20mL/hr x24hr.  >> As tolerated & feasible, recommend advance to goal rate of 65mL/hr x24hr to provide 1560mL total volume, 1872kcal (21kcal/kg daily wt 87.2kg), 117g protein (1.7g/kg IBW 69.8kg), & 1265mL free water.   >>Defer free water flushes to team.   >>Monitor GI tolerance. RD remains available to adjust EN formula/rate/volume prn.   2. Recommend add multivitamin & thiamin pending no medical contraindications to help prevent micronutrient deficiencies.   3. Monitor weight trends, labs, & skin integrity. Replete electrolytes prn.   4. If unable to advance feeds, consider initiation of TPN. Pt with ongoing severe malnutrition.     RD remains available upon request and will follow up per protocol  Emmy Quinones, Dietetic Intern pager #[phone]

## 2022-12-06 NOTE — PROGRESS NOTE ADULT - PROBLEM SELECTOR PLAN 1
- troponin peaked at > 36659   - [facility] today with IABP placement today, revealed that 3 grafts are closed w/ distal native disease from remaining LAD graft  - continue statin  - would start ASA when okay with surgical team. - troponin peaked at > 52981   - [facility] today with IABP placement today, revealed that 3 grafts are closed w/ distal native disease from remaining LAD graft  - continue statin  - would start ASA when okay with surgical team. - troponin peaked at > 46937   - [facility] today with IABP placement today, revealed that 3 grafts are closed w/ distal native disease from remaining LAD graft  - continue statin  - would start ASA when okay with surgical team.

## 2022-12-06 NOTE — PROGRESS NOTE ADULT - ATTENDING COMMENTS
Started on dialysis. Able to move all extremities. On exam, NAD, JVP approx 6 cm w/ HJR, RRR, CTA anterior lung fields, abdomen distended with minimal bowel sounds, no edema, pulses intact. Labs reviewed - Na 143 (improved), BUN/Cr 61/2.51 (on HD), albumin 3.0, amylase/lipase stably elevated (improved from prior). LHC today revealed patent LIMA to LAD,  of RCA/LCx with L to L and L to R collaterals; aortogram revealed no filling of any other grafts.   - c/w CVVH; goal net negative  - will attempt weaning of ECMO now that IABP is in  - c/w anticoagulation  - on statin  - prognosis guarded; family meeting held 12/6 and discussed at length current state and next steps with plan to f/u next week

## 2022-12-06 NOTE — PROGRESS NOTE ADULT - SUBJECTIVE AND OBJECTIVE BOX
CARDIOLOGY CONSULT PROGRESS NOTE  EDUARDO REAL  [id number]    INTERVAL EVENTS:  - tele:   -     ROS:  Negative, except as above.    MEDICATIONS  (STANDING):  argatroban Infusion 0.075 MICROgram(s)/kG/Min (0.42 mL/Hr) IV Continuous <Continuous>  artificial  tears Solution 1 Drop(s) Both EYES four times a day  atorvastatin 40 milliGRAM(s) Oral at bedtime  chlorhexidine 0.12% Liquid 15 milliLiter(s) Oral Mucosa every 12 hours  chlorhexidine 2% Cloths 1 Application(s) Topical <User Schedule>  CRRT Treatment    <Continuous>  dexMEDEtomidine Infusion 1.5 MICROgram(s)/kG/Hr (34.8 mL/Hr) IV Continuous <Continuous>  dextrose 50% Injectable 50 milliLiter(s) IV Push every 15 minutes  HYDROmorphone  Injectable 0.5 milliGRAM(s) IV Push every 6 hours  insulin regular Infusion 6 Unit(s)/Hr (6 mL/Hr) IV Continuous <Continuous>  norepinephrine Infusion 0.05 MICROgram(s)/kG/Min (8.71 mL/Hr) IV Continuous <Continuous>  pantoprazole  Injectable 40 milliGRAM(s) IV Push two times a day  polyethylene glycol 3350 17 Gram(s) Oral two times a day  PrismaSATE Dialysate BK 0 / 3.5 5000 milliLiter(s) (2000 mL/Hr) CRRT <Continuous>  PrismaSOL Filtration BGK 4 / 2.5 5000 milliLiter(s) (800 mL/Hr) CRRT <Continuous>  PrismaSOL Filtration BGK 4 / 2.5 5000 milliLiter(s) (200 mL/Hr) CRRT <Continuous>  propofol Infusion 17.94 MICROgram(s)/kG/Min (10 mL/Hr) IV Continuous <Continuous>  senna 2 Tablet(s) Oral at bedtime  sodium chloride 0.9%. 1000 milliLiter(s) (5 mL/Hr) IV Continuous <Continuous>    MEDICATIONS  (PRN):  sodium chloride 0.9% lock flush 10 milliLiter(s) IV Push every 1 hour PRN Pre/post blood products, medications, blood draw, and to maintain line patency    Allergies  No Known Allergies    P/E:  Adult Advanced Hemodynamics Last 24 Hrs  CVP(mm Hg): 5 (06 Dec 2022 06:45) (3 - 18)    Vital Signs Last 24 Hrs  T(C): 36.4 (06 Dec 2022 04:00), Max: 36.8 (05 Dec 2022 08:00)  T(F): 97.5 (06 Dec 2022 04:00), Max: 98.2 (05 Dec 2022 08:00)  HR: 110 (06 Dec 2022 06:45) (81 - 111)  RR: 14 (06 Dec 2022 06:30) (13 - 26)  SpO2: 100% (06 Dec 2022 06:45) (100% - 100%)    Patient On (Oxygen Delivery Method): ventilator  O2 Concentration (%): 40    Daily Weight in k.4 (06 Dec 2022 00:00)    I&O's Detail  05 Dec 2022 07:01  -  06 Dec 2022 07:00  IN:    Argatroban: 4.8 mL    Argatroban: 6.4 mL    Dexmedetomidine: 835.2 mL    Enteral Tube Flush: 180 mL    Insulin: 54 mL    Norepinephrine: 43 mL    Propofol: 19.6 mL    sodium chloride 0.9%: 120 mL    Vital1.5: 400 mL  Total IN: 1663 mL  OUT:    Indwelling Catheter - Urethral (mL): 1430 mL    Other (mL): 1315 mL  Total OUT: 2745 mL  Total NET: -1082 mL    I&O's Summary  05 Dec 2022 07:01  -  06 Dec 2022 07:00  --------------------------------------------------------  IN: 1663 mL / OUT: 2745 mL / NET: -1082 mL    - gen: intubated/sedated, laying in hospital bed, rousable and follows commands  - HEENT: ETT in place, MMM  - heart: RRR, systolic murmur, S1/S2  - lungs: mechanical BS  - abd: distended, soft, NT, hypoactive BS  - ext: WWP, PPP, 2+ MESERET, ECMO cannulas in b/l groins w/ intact pulses    RELEVANT RECENT LABS/IMAGING/STUDIES:                        8.3    16.49 )-----------( 162      ( 06 Dec 2022 00:40 )             25.3     12-06    143  |  105  |  61<H>  ----------------------------<  141<H>  4.0   |  23  |  2.51<H>    Ca    8.9      06 Dec 2022 00:40  Phos  4.6       Mg     2.3         TPro  6.2  /  Alb  3.1<L>  /  TBili  1.4<H>  /  DBili  x   /  AST  67<H>  /  ALT  9<L>  /  AlkPhos  261<H>      LIVER FUNCTIONS - ( 06 Dec 2022 00:40 )  Alb: 3.1 g/dL / Pro: 6.2 g/dL / ALK PHOS: 261 U/L / ALT: 9 U/L / AST: 67 U/L / GGT: x           PT/INR - ( 05 Dec 2022 13:57 )   PT: 17.7 sec;   INR: 1.52 ratio       PTT - ( 06 Dec 2022 05:16 )  PTT:45.1 sec    ABG - ( 06 Dec 2022 05:55 )  pH, Arterial: 7.34  pH, Blood: x     /  pCO2: 51    /  pO2: 463   / HCO3: 28    / Base Excess: 1.3   /  SaO2: 99.4      Culture - Blood (collected 03 Dec 2022 17:05)  Source: .Blood Blood  Preliminary Report (04 Dec 2022 20:02):    No growth to date.    Culture - Sputum (collected 03 Dec 2022 15:29)  Source: ET Tube ET Tube  Gram Stain (04 Dec 2022 05:13):    Rare polymorphonuclear leukocytes per low power field    No Squamous epithelial cells per low power field    No organisms seen per oil power field  Final Report (05 Dec 2022 17:30):    Normal Respiratory Christy present    Culture - Blood (collected 03 Dec 2022 14:18)  Source: .Blood Blood  Preliminary Report (04 Dec 2022 19:02):    No growth to date. CARDIOLOGY CONSULT PROGRESS NOTE  EDUARDO REAL  MRN-[id number]    INTERVAL EVENTS:  - tele:   -     ROS:  Negative, except as above.    MEDICATIONS  (STANDING):  argatroban Infusion 0.075 MICROgram(s)/kG/Min (0.42 mL/Hr) IV Continuous <Continuous>  artificial  tears Solution 1 Drop(s) Both EYES four times a day  atorvastatin 40 milliGRAM(s) Oral at bedtime  chlorhexidine 0.12% Liquid 15 milliLiter(s) Oral Mucosa every 12 hours  chlorhexidine 2% Cloths 1 Application(s) Topical <User Schedule>  CRRT Treatment    <Continuous>  dexMEDEtomidine Infusion 1.5 MICROgram(s)/kG/Hr (34.8 mL/Hr) IV Continuous <Continuous>  dextrose 50% Injectable 50 milliLiter(s) IV Push every 15 minutes  HYDROmorphone  Injectable 0.5 milliGRAM(s) IV Push every 6 hours  insulin regular Infusion 6 Unit(s)/Hr (6 mL/Hr) IV Continuous <Continuous>  norepinephrine Infusion 0.05 MICROgram(s)/kG/Min (8.71 mL/Hr) IV Continuous <Continuous>  pantoprazole  Injectable 40 milliGRAM(s) IV Push two times a day  polyethylene glycol 3350 17 Gram(s) Oral two times a day  PrismaSATE Dialysate BK 0 / 3.5 5000 milliLiter(s) (2000 mL/Hr) CRRT <Continuous>  PrismaSOL Filtration BGK 4 / 2.5 5000 milliLiter(s) (800 mL/Hr) CRRT <Continuous>  PrismaSOL Filtration BGK 4 / 2.5 5000 milliLiter(s) (200 mL/Hr) CRRT <Continuous>  propofol Infusion 17.94 MICROgram(s)/kG/Min (10 mL/Hr) IV Continuous <Continuous>  senna 2 Tablet(s) Oral at bedtime  sodium chloride 0.9%. 1000 milliLiter(s) (5 mL/Hr) IV Continuous <Continuous>    MEDICATIONS  (PRN):  sodium chloride 0.9% lock flush 10 milliLiter(s) IV Push every 1 hour PRN Pre/post blood products, medications, blood draw, and to maintain line patency    Allergies  No Known Allergies    P/E:  Adult Advanced Hemodynamics Last 24 Hrs  CVP(mm Hg): 5 (06 Dec 2022 06:45) (3 - 18)    Vital Signs Last 24 Hrs  T(C): 36.4 (06 Dec 2022 04:00), Max: 36.8 (05 Dec 2022 08:00)  T(F): 97.5 (06 Dec 2022 04:00), Max: 98.2 (05 Dec 2022 08:00)  HR: 110 (06 Dec 2022 06:45) (81 - 111)  RR: 14 (06 Dec 2022 06:30) (13 - 26)  SpO2: 100% (06 Dec 2022 06:45) (100% - 100%)    Patient On (Oxygen Delivery Method): ventilator  O2 Concentration (%): 40    Daily Weight in k.4 (06 Dec 2022 00:00)    I&O's Detail  05 Dec 2022 07:01  -  06 Dec 2022 07:00  IN:    Argatroban: 4.8 mL    Argatroban: 6.4 mL    Dexmedetomidine: 835.2 mL    Enteral Tube Flush: 180 mL    Insulin: 54 mL    Norepinephrine: 43 mL    Propofol: 19.6 mL    sodium chloride 0.9%: 120 mL    Vital1.5: 400 mL  Total IN: 1663 mL  OUT:    Indwelling Catheter - Urethral (mL): 1430 mL    Other (mL): 1315 mL  Total OUT: 2745 mL  Total NET: -1082 mL    I&O's Summary  05 Dec 2022 07:01  -  06 Dec 2022 07:00  --------------------------------------------------------  IN: 1663 mL / OUT: 2745 mL / NET: -1082 mL    - gen: intubated/sedated, laying in hospital bed, rousable and follows commands  - HEENT: ETT in place, MMM  - heart: RRR, systolic murmur, S1/S2  - lungs: mechanical BS  - abd: distended, soft, NT, hypoactive BS  - ext: WWP, PPP, 2+ MESERET, ECMO cannulas in b/l groins w/ intact pulses    RELEVANT RECENT LABS/IMAGING/STUDIES:                        8.3    16.49 )-----------( 162      ( 06 Dec 2022 00:40 )             25.3     12-06    143  |  105  |  61<H>  ----------------------------<  141<H>  4.0   |  23  |  2.51<H>    Ca    8.9      06 Dec 2022 00:40  Phos  4.6       Mg     2.3         TPro  6.2  /  Alb  3.1<L>  /  TBili  1.4<H>  /  DBili  x   /  AST  67<H>  /  ALT  9<L>  /  AlkPhos  261<H>      LIVER FUNCTIONS - ( 06 Dec 2022 00:40 )  Alb: 3.1 g/dL / Pro: 6.2 g/dL / ALK PHOS: 261 U/L / ALT: 9 U/L / AST: 67 U/L / GGT: x           PT/INR - ( 05 Dec 2022 13:57 )   PT: 17.7 sec;   INR: 1.52 ratio       PTT - ( 06 Dec 2022 05:16 )  PTT:45.1 sec    ABG - ( 06 Dec 2022 05:55 )  pH, Arterial: 7.34  pH, Blood: x     /  pCO2: 51    /  pO2: 463   / HCO3: 28    / Base Excess: 1.3   /  SaO2: 99.4      Culture - Blood (collected 03 Dec 2022 17:05)  Source: .Blood Blood  Preliminary Report (04 Dec 2022 20:02):    No growth to date.    Culture - Sputum (collected 03 Dec 2022 15:29)  Source: ET Tube ET Tube  Gram Stain (04 Dec 2022 05:13):    Rare polymorphonuclear leukocytes per low power field    No Squamous epithelial cells per low power field    No organisms seen per oil power field  Final Report (05 Dec 2022 17:30):    Normal Respiratory Christy present    Culture - Blood (collected 03 Dec 2022 14:18)  Source: .Blood Blood  Preliminary Report (04 Dec 2022 19:02):    No growth to date. CARDIOLOGY CONSULT PROGRESS NOTE  EDUARDO REAL  MRN-[id number]    INTERVAL EVENTS:  - tele:   -     ROS:  Negative, except as above.    MEDICATIONS  (STANDING):  argatroban Infusion 0.075 MICROgram(s)/kG/Min (0.42 mL/Hr) IV Continuous <Continuous>  artificial  tears Solution 1 Drop(s) Both EYES four times a day  atorvastatin 40 milliGRAM(s) Oral at bedtime  chlorhexidine 0.12% Liquid 15 milliLiter(s) Oral Mucosa every 12 hours  chlorhexidine 2% Cloths 1 Application(s) Topical <User Schedule>  CRRT Treatment    <Continuous>  dexMEDEtomidine Infusion 1.5 MICROgram(s)/kG/Hr (34.8 mL/Hr) IV Continuous <Continuous>  dextrose 50% Injectable 50 milliLiter(s) IV Push every 15 minutes  HYDROmorphone  Injectable 0.5 milliGRAM(s) IV Push every 6 hours  insulin regular Infusion 6 Unit(s)/Hr (6 mL/Hr) IV Continuous <Continuous>  norepinephrine Infusion 0.05 MICROgram(s)/kG/Min (8.71 mL/Hr) IV Continuous <Continuous>  pantoprazole  Injectable 40 milliGRAM(s) IV Push two times a day  polyethylene glycol 3350 17 Gram(s) Oral two times a day  PrismaSATE Dialysate BK 0 / 3.5 5000 milliLiter(s) (2000 mL/Hr) CRRT <Continuous>  PrismaSOL Filtration BGK 4 / 2.5 5000 milliLiter(s) (800 mL/Hr) CRRT <Continuous>  PrismaSOL Filtration BGK 4 / 2.5 5000 milliLiter(s) (200 mL/Hr) CRRT <Continuous>  propofol Infusion 17.94 MICROgram(s)/kG/Min (10 mL/Hr) IV Continuous <Continuous>  senna 2 Tablet(s) Oral at bedtime  sodium chloride 0.9%. 1000 milliLiter(s) (5 mL/Hr) IV Continuous <Continuous>    MEDICATIONS  (PRN):  sodium chloride 0.9% lock flush 10 milliLiter(s) IV Push every 1 hour PRN Pre/post blood products, medications, blood draw, and to maintain line patency    Allergies  No Known Allergies    P/E:  Adult Advanced Hemodynamics Last 24 Hrs  CVP(mm Hg): 5 (06 Dec 2022 06:45) (3 - 18)    Vital Signs Last 24 Hrs  T(C): 36.4 (06 Dec 2022 04:00), Max: 36.8 (05 Dec 2022 08:00)  T(F): 97.5 (06 Dec 2022 04:00), Max: 98.2 (05 Dec 2022 08:00)  HR: 110 (06 Dec 2022 06:45) (81 - 111)  RR: 14 (06 Dec 2022 06:30) (13 - 26)  SpO2: 100% (06 Dec 2022 06:45) (100% - 100%)    Patient On (Oxygen Delivery Method): ventilator  O2 Concentration (%): 40    Daily Weight in k.4 (06 Dec 2022 00:00)    I&O's Detail  05 Dec 2022 07:01  -  06 Dec 2022 07:00  IN:    Argatroban: 4.8 mL    Argatroban: 6.4 mL    Dexmedetomidine: 835.2 mL    Enteral Tube Flush: 180 mL    Insulin: 54 mL    Norepinephrine: 43 mL    Propofol: 19.6 mL    sodium chloride 0.9%: 120 mL    Vital1.5: 400 mL  Total IN: 1663 mL  OUT:    Indwelling Catheter - Urethral (mL): 1430 mL    Other (mL): 1315 mL  Total OUT: 2745 mL  Total NET: -1082 mL    I&O's Summary  05 Dec 2022 07:01  -  06 Dec 2022 07:00  --------------------------------------------------------  IN: 1663 mL / OUT: 2745 mL / NET: -1082 mL    - gen: intubated/sedated, laying in hospital bed, rousable and follows commands  - HEENT: ETT in place, MMM  - heart: RRR, systolic murmur, S1/S2  - lungs: mechanical BS  - abd: distended, soft, NT, hypoactive BS  - ext: WWP, PPP, 2+ MESERET, ECMO cannulas in b/l groins w/ intact pulses    RELEVANT RECENT LABS/IMAGING/STUDIES:                        8.3    16.49 )-----------( 162      ( 06 Dec 2022 00:40 )             25.3     12-06    143  |  105  |  61<H>  ----------------------------<  141<H>  4.0   |  23  |  2.51<H>    Ca    8.9      06 Dec 2022 00:40  Phos  4.6       Mg     2.3         TPro  6.2  /  Alb  3.1<L>  /  TBili  1.4<H>  /  DBili  x   /  AST  67<H>  /  ALT  9<L>  /  AlkPhos  261<H>      LIVER FUNCTIONS - ( 06 Dec 2022 00:40 )  Alb: 3.1 g/dL / Pro: 6.2 g/dL / ALK PHOS: 261 U/L / ALT: 9 U/L / AST: 67 U/L / GGT: x           PT/INR - ( 05 Dec 2022 13:57 )   PT: 17.7 sec;   INR: 1.52 ratio       PTT - ( 06 Dec 2022 05:16 )  PTT:45.1 sec    ABG - ( 06 Dec 2022 05:55 )  pH, Arterial: 7.34  pH, Blood: x     /  pCO2: 51    /  pO2: 463   / HCO3: 28    / Base Excess: 1.3   /  SaO2: 99.4      Culture - Blood (collected 03 Dec 2022 17:05)  Source: .Blood Blood  Preliminary Report (04 Dec 2022 20:02):    No growth to date.    Culture - Sputum (collected 03 Dec 2022 15:29)  Source: ET Tube ET Tube  Gram Stain (04 Dec 2022 05:13):    Rare polymorphonuclear leukocytes per low power field    No Squamous epithelial cells per low power field    No organisms seen per oil power field  Final Report (05 Dec 2022 17:30):    Normal Respiratory Christy present    Culture - Blood (collected 03 Dec 2022 14:18)  Source: .Blood Blood  Preliminary Report (04 Dec 2022 19:02):    No growth to date. CARDIOLOGY CONSULT PROGRESS NOTE  EDUARDO REAL  MRN-[id number]    INTERVAL EVENTS:  - tele: NSR 70-110s, no events  - intubated/sedated  - family meeting held today with primary team, heart failure, and pt's wife, daughter, and son, reviewed plan for LHC and IABP placement with subsequent attempt at ECMO weaning, risks of pt needing short to possibly long term dialysis, as well as possible outcomes ranging from eventual return home with good function to failure of wean and possible palliation, plan to have another meeting next week to discuss progress/developments    ROS:  Negative, except as above.    MEDICATIONS  (STANDING):  argatroban Infusion 0.075 MICROgram(s)/kG/Min (0.42 mL/Hr) IV Continuous <Continuous>  artificial  tears Solution 1 Drop(s) Both EYES four times a day  atorvastatin 40 milliGRAM(s) Oral at bedtime  chlorhexidine 0.12% Liquid 15 milliLiter(s) Oral Mucosa every 12 hours  chlorhexidine 2% Cloths 1 Application(s) Topical <User Schedule>  CRRT Treatment    <Continuous>  dexMEDEtomidine Infusion 1.5 MICROgram(s)/kG/Hr (34.8 mL/Hr) IV Continuous <Continuous>  dextrose 50% Injectable 50 milliLiter(s) IV Push every 15 minutes  HYDROmorphone  Injectable 0.5 milliGRAM(s) IV Push every 6 hours  insulin regular Infusion 6 Unit(s)/Hr (6 mL/Hr) IV Continuous <Continuous>  norepinephrine Infusion 0.05 MICROgram(s)/kG/Min (8.71 mL/Hr) IV Continuous <Continuous>  pantoprazole  Injectable 40 milliGRAM(s) IV Push two times a day  polyethylene glycol 3350 17 Gram(s) Oral two times a day  PrismaSATE Dialysate BK 0 / 3.5 5000 milliLiter(s) (2000 mL/Hr) CRRT <Continuous>  PrismaSOL Filtration BGK 4 / 2.5 5000 milliLiter(s) (800 mL/Hr) CRRT <Continuous>  PrismaSOL Filtration BGK 4 / 2.5 5000 milliLiter(s) (200 mL/Hr) CRRT <Continuous>  propofol Infusion 17.94 MICROgram(s)/kG/Min (10 mL/Hr) IV Continuous <Continuous>  senna 2 Tablet(s) Oral at bedtime  sodium chloride 0.9%. 1000 milliLiter(s) (5 mL/Hr) IV Continuous <Continuous>    MEDICATIONS  (PRN):  sodium chloride 0.9% lock flush 10 milliLiter(s) IV Push every 1 hour PRN Pre/post blood products, medications, blood draw, and to maintain line patency    Allergies  No Known Allergies    P/E:  Adult Advanced Hemodynamics Last 24 Hrs  CVP(mm Hg): 5 (06 Dec 2022 06:45) (3 - 18)    Vital Signs Last 24 Hrs  T(C): 36.4 (06 Dec 2022 04:00), Max: 36.8 (05 Dec 2022 08:00)  T(F): 97.5 (06 Dec 2022 04:00), Max: 98.2 (05 Dec 2022 08:00)  HR: 110 (06 Dec 2022 06:45) (81 - 111)  RR: 14 (06 Dec 2022 06:30) (13 - 26)  SpO2: 100% (06 Dec 2022 06:45) (100% - 100%)    Patient On (Oxygen Delivery Method): ventilator  O2 Concentration (%): 40    Daily Weight in k.4 (06 Dec 2022 00:00)    I&O's Detail  05 Dec 2022 07:01  -  06 Dec 2022 07:00  IN:    Argatroban: 4.8 mL    Argatroban: 6.4 mL    Dexmedetomidine: 835.2 mL    Enteral Tube Flush: 180 mL    Insulin: 54 mL    Norepinephrine: 43 mL    Propofol: 19.6 mL    sodium chloride 0.9%: 120 mL    Vital1.5: 400 mL  Total IN: 1663 mL  OUT:    Indwelling Catheter - Urethral (mL): 1430 mL    Other (mL): 1315 mL  Total OUT: 2745 mL  Total NET: -1082 mL    I&O's Summary  05 Dec 2022 07:01  -  06 Dec 2022 07:00  --------------------------------------------------------  IN: 1663 mL / OUT: 2745 mL / NET: -1082 mL    - gen: intubated/sedated, laying in hospital bed, rousable and follows commands  - HEENT: ETT in place, MMM  - heart: RRR, systolic murmur, S1/S2  - lungs: mechanical BS  - abd: distended, soft, NT, hypoactive BS  - ext: WWP, PPP, 2+ MESERET, ECMO cannulas in b/l groins w/ intact pulses    RELEVANT RECENT LABS/IMAGING/STUDIES:                        8.3    16.49 )-----------( 162      ( 06 Dec 2022 00:40 )             25.3     12-    143  |  105  |  61<H>  ----------------------------<  141<H>  4.0   |  23  |  2.51<H>    Ca    8.9      06 Dec 2022 00:40  Phos  4.6       Mg     2.3         TPro  6.2  /  Alb  3.1<L>  /  TBili  1.4<H>  /  DBili  x   /  AST  67<H>  /  ALT  9<L>  /  AlkPhos  261<H>  12    LIVER FUNCTIONS - ( 06 Dec 2022 00:40 )  Alb: 3.1 g/dL / Pro: 6.2 g/dL / ALK PHOS: 261 U/L / ALT: 9 U/L / AST: 67 U/L / GGT: x           PT/INR - ( 05 Dec 2022 13:57 )   PT: 17.7 sec;   INR: 1.52 ratio       PTT - ( 06 Dec 2022 05:16 )  PTT:45.1 sec    ABG - ( 06 Dec 2022 05:55 )  pH, Arterial: 7.34  pH, Blood: x     /  pCO2: 51    /  pO2: 463   / HCO3: 28    / Base Excess: 1.3   /  SaO2: 99.4      Culture - Blood (collected 03 Dec 2022 17:05)  Source: .Blood Blood  Preliminary Report (04 Dec 2022 20:02):    No growth to date.    Culture - Sputum (collected 03 Dec 2022 15:29)  Source: ET Tube ET Tube  Gram Stain (04 Dec 2022 05:13):    Rare polymorphonuclear leukocytes per low power field    No Squamous epithelial cells per low power field    No organisms seen per oil power field  Final Report (05 Dec 2022 17:30):    Normal Respiratory Christy present    Culture - Blood (collected 03 Dec 2022 14:18)  Source: .Blood Blood  Preliminary Report (04 Dec 2022 19:02):    No growth to date. CARDIOLOGY CONSULT PROGRESS NOTE  EDUARDO REAL  MRN-[id number]    INTERVAL EVENTS:  - tele: NSR 70-110s, no events  - intubated/sedated  - family meeting held today with primary team, heart failure, and pt's wife, daughter, and son, reviewed plan for LHC and IABP placement with subsequent attempt at ECMO weaning, risks of pt needing short to possibly long term dialysis, as well as possible outcomes ranging from eventual return home with good function to failure of wean and possible palliation, plan to have another meeting next week to discuss progress/developments    ROS:  Negative, except as above.    MEDICATIONS  (STANDING):  argatroban Infusion 0.075 MICROgram(s)/kG/Min (0.42 mL/Hr) IV Continuous <Continuous>  artificial  tears Solution 1 Drop(s) Both EYES four times a day  atorvastatin 40 milliGRAM(s) Oral at bedtime  chlorhexidine 0.12% Liquid 15 milliLiter(s) Oral Mucosa every 12 hours  chlorhexidine 2% Cloths 1 Application(s) Topical <User Schedule>  CRRT Treatment    <Continuous>  dexMEDEtomidine Infusion 1.5 MICROgram(s)/kG/Hr (34.8 mL/Hr) IV Continuous <Continuous>  dextrose 50% Injectable 50 milliLiter(s) IV Push every 15 minutes  HYDROmorphone  Injectable 0.5 milliGRAM(s) IV Push every 6 hours  insulin regular Infusion 6 Unit(s)/Hr (6 mL/Hr) IV Continuous <Continuous>  norepinephrine Infusion 0.05 MICROgram(s)/kG/Min (8.71 mL/Hr) IV Continuous <Continuous>  pantoprazole  Injectable 40 milliGRAM(s) IV Push two times a day  polyethylene glycol 3350 17 Gram(s) Oral two times a day  PrismaSATE Dialysate BK 0 / 3.5 5000 milliLiter(s) (2000 mL/Hr) CRRT <Continuous>  PrismaSOL Filtration BGK 4 / 2.5 5000 milliLiter(s) (800 mL/Hr) CRRT <Continuous>  PrismaSOL Filtration BGK 4 / 2.5 5000 milliLiter(s) (200 mL/Hr) CRRT <Continuous>  propofol Infusion 17.94 MICROgram(s)/kG/Min (10 mL/Hr) IV Continuous <Continuous>  senna 2 Tablet(s) Oral at bedtime  sodium chloride 0.9%. 1000 milliLiter(s) (5 mL/Hr) IV Continuous <Continuous>    MEDICATIONS  (PRN):  sodium chloride 0.9% lock flush 10 milliLiter(s) IV Push every 1 hour PRN Pre/post blood products, medications, blood draw, and to maintain line patency    Allergies  No Known Allergies    P/E:  Adult Advanced Hemodynamics Last 24 Hrs  CVP(mm Hg): 5 (06 Dec 2022 06:45) (3 - 18)    Vital Signs Last 24 Hrs  T(C): 36.4 (06 Dec 2022 04:00), Max: 36.8 (05 Dec 2022 08:00)  T(F): 97.5 (06 Dec 2022 04:00), Max: 98.2 (05 Dec 2022 08:00)  HR: 110 (06 Dec 2022 06:45) (81 - 111)  RR: 14 (06 Dec 2022 06:30) (13 - 26)  SpO2: 100% (06 Dec 2022 06:45) (100% - 100%)    Patient On (Oxygen Delivery Method): ventilator  O2 Concentration (%): 40    Daily Weight in k.4 (06 Dec 2022 00:00)    I&O's Detail  05 Dec 2022 07:01  -  06 Dec 2022 07:00  IN:    Argatroban: 4.8 mL    Argatroban: 6.4 mL    Dexmedetomidine: 835.2 mL    Enteral Tube Flush: 180 mL    Insulin: 54 mL    Norepinephrine: 43 mL    Propofol: 19.6 mL    sodium chloride 0.9%: 120 mL    Vital1.5: 400 mL  Total IN: 1663 mL  OUT:    Indwelling Catheter - Urethral (mL): 1430 mL    Other (mL): 1315 mL  Total OUT: 2745 mL  Total NET: -1082 mL    I&O's Summary  05 Dec 2022 07:01  -  06 Dec 2022 07:00  --------------------------------------------------------  IN: 1663 mL / OUT: 2745 mL / NET: -1082 mL    - gen: intubated/sedated, laying in hospital bed, rousable and follows commands  - HEENT: ETT in place, MMM  - heart: RRR, systolic murmur, S1/S2  - lungs: mechanical BS  - abd: distended, soft, NT, hypoactive BS  - ext: WWP, PPP, 2+ MESERET, ECMO cannulas in b/l groins w/ intact pulses    RELEVANT RECENT LABS/IMAGING/STUDIES:                        8.3    16.49 )-----------( 162      ( 06 Dec 2022 00:40 )             25.3     12-    143  |  105  |  61<H>  ----------------------------<  141<H>  4.0   |  23  |  2.51<H>    Ca    8.9      06 Dec 2022 00:40  Phos  4.6       Mg     2.3         TPro  6.2  /  Alb  3.1<L>  /  TBili  1.4<H>  /  DBili  x   /  AST  67<H>  /  ALT  9<L>  /  AlkPhos  261<H>  12    LIVER FUNCTIONS - ( 06 Dec 2022 00:40 )  Alb: 3.1 g/dL / Pro: 6.2 g/dL / ALK PHOS: 261 U/L / ALT: 9 U/L / AST: 67 U/L / GGT: x           PT/INR - ( 05 Dec 2022 13:57 )   PT: 17.7 sec;   INR: 1.52 ratio       PTT - ( 06 Dec 2022 05:16 )  PTT:45.1 sec    ABG - ( 06 Dec 2022 05:55 )  pH, Arterial: 7.34  pH, Blood: x     /  pCO2: 51    /  pO2: 463   / HCO3: 28    / Base Excess: 1.3   /  SaO2: 99.4      Culture - Blood (collected 03 Dec 2022 17:05)  Source: .Blood Blood  Preliminary Report (04 Dec 2022 20:02):    No growth to date.    Culture - Sputum (collected 03 Dec 2022 15:29)  Source: ET Tube ET Tube  Gram Stain (04 Dec 2022 05:13):    Rare polymorphonuclear leukocytes per low power field    No Squamous epithelial cells per low power field    No organisms seen per oil power field  Final Report (05 Dec 2022 17:30):    Normal Respiratory Christy present    Culture - Blood (collected 03 Dec 2022 14:18)  Source: .Blood Blood  Preliminary Report (04 Dec 2022 19:02):    No growth to date. CARDIOLOGY CONSULT PROGRESS NOTE  EDUARDO REAL  [id number]    INTERVAL EVENTS:  - tele: NSR 70-110s, no events  - intubated/sedated  - family meeting held today with primary team, heart failure, and pt's wife, daughter, and son, reviewed plan for LHC and IABP placement with subsequent attempt at ECMO weaning, risks of pt needing short to possibly long term dialysis, as well as possible outcomes ranging from eventual return home with good function to failure of wean and possible palliation, plan to have another meeting next week to discuss progress/developments    ROS:  Negative, except as above.    MEDICATIONS  (STANDING):  argatroban Infusion 0.075 MICROgram(s)/kG/Min (0.42 mL/Hr) IV Continuous <Continuous>  artificial  tears Solution 1 Drop(s) Both EYES four times a day  atorvastatin 40 milliGRAM(s) Oral at bedtime  chlorhexidine 0.12% Liquid 15 milliLiter(s) Oral Mucosa every 12 hours  chlorhexidine 2% Cloths 1 Application(s) Topical <User Schedule>  CRRT Treatment    <Continuous>  dexMEDEtomidine Infusion 1.5 MICROgram(s)/kG/Hr (34.8 mL/Hr) IV Continuous <Continuous>  dextrose 50% Injectable 50 milliLiter(s) IV Push every 15 minutes  HYDROmorphone  Injectable 0.5 milliGRAM(s) IV Push every 6 hours  insulin regular Infusion 6 Unit(s)/Hr (6 mL/Hr) IV Continuous <Continuous>  norepinephrine Infusion 0.05 MICROgram(s)/kG/Min (8.71 mL/Hr) IV Continuous <Continuous>  pantoprazole  Injectable 40 milliGRAM(s) IV Push two times a day  polyethylene glycol 3350 17 Gram(s) Oral two times a day  PrismaSATE Dialysate BK 0 / 3.5 5000 milliLiter(s) (2000 mL/Hr) CRRT <Continuous>  PrismaSOL Filtration BGK 4 / 2.5 5000 milliLiter(s) (800 mL/Hr) CRRT <Continuous>  PrismaSOL Filtration BGK 4 / 2.5 5000 milliLiter(s) (200 mL/Hr) CRRT <Continuous>  propofol Infusion 17.94 MICROgram(s)/kG/Min (10 mL/Hr) IV Continuous <Continuous>  senna 2 Tablet(s) Oral at bedtime  sodium chloride 0.9%. 1000 milliLiter(s) (5 mL/Hr) IV Continuous <Continuous>    MEDICATIONS  (PRN):  sodium chloride 0.9% lock flush 10 milliLiter(s) IV Push every 1 hour PRN Pre/post blood products, medications, blood draw, and to maintain line patency    Allergies  No Known Allergies    P/E:  Adult Advanced Hemodynamics Last 24 Hrs  CVP(mm Hg): 5 (06 Dec 2022 06:45) (3 - 18)    Vital Signs Last 24 Hrs  T(C): 36.4 (06 Dec 2022 04:00), Max: 36.8 (05 Dec 2022 08:00)  T(F): 97.5 (06 Dec 2022 04:00), Max: 98.2 (05 Dec 2022 08:00)  HR: 110 (06 Dec 2022 06:45) (81 - 111)  RR: 14 (06 Dec 2022 06:30) (13 - 26)  SpO2: 100% (06 Dec 2022 06:45) (100% - 100%)    Patient On (Oxygen Delivery Method): ventilator  O2 Concentration (%): 40    Daily Weight in k.4 (06 Dec 2022 00:00)    I&O's Detail  05 Dec 2022 07:01  -  06 Dec 2022 07:00  IN:    Argatroban: 4.8 mL    Argatroban: 6.4 mL    Dexmedetomidine: 835.2 mL    Enteral Tube Flush: 180 mL    Insulin: 54 mL    Norepinephrine: 43 mL    Propofol: 19.6 mL    sodium chloride 0.9%: 120 mL    Vital1.5: 400 mL  Total IN: 1663 mL  OUT:    Indwelling Catheter - Urethral (mL): 1430 mL    Other (mL): 1315 mL  Total OUT: 2745 mL  Total NET: -1082 mL    I&O's Summary  05 Dec 2022 07:01  -  06 Dec 2022 07:00  --------------------------------------------------------  IN: 1663 mL / OUT: 2745 mL / NET: -1082 mL    - gen: intubated/sedated, laying in hospital bed, rousable and follows commands  - HEENT: ETT in place, MMM  - heart: RRR, systolic murmur, S1/S2  - lungs: mechanical BS  - abd: distended, soft, NT, hypoactive BS  - ext: WWP, PPP, 2+ MESERET, ECMO cannulas in b/l groins w/ intact pulses    RELEVANT RECENT LABS/IMAGING/STUDIES:                        8.3    16.49 )-----------( 162      ( 06 Dec 2022 00:40 )             25.3     12-    143  |  105  |  61<H>  ----------------------------<  141<H>  4.0   |  23  |  2.51<H>    Ca    8.9      06 Dec 2022 00:40  Phos  4.6       Mg     2.3         TPro  6.2  /  Alb  3.1<L>  /  TBili  1.4<H>  /  DBili  x   /  AST  67<H>  /  ALT  9<L>  /  AlkPhos  261<H>  12    LIVER FUNCTIONS - ( 06 Dec 2022 00:40 )  Alb: 3.1 g/dL / Pro: 6.2 g/dL / ALK PHOS: 261 U/L / ALT: 9 U/L / AST: 67 U/L / GGT: x           PT/INR - ( 05 Dec 2022 13:57 )   PT: 17.7 sec;   INR: 1.52 ratio       PTT - ( 06 Dec 2022 05:16 )  PTT:45.1 sec    ABG - ( 06 Dec 2022 05:55 )  pH, Arterial: 7.34  pH, Blood: x     /  pCO2: 51    /  pO2: 463   / HCO3: 28    / Base Excess: 1.3   /  SaO2: 99.4      Culture - Blood (collected 03 Dec 2022 17:05)  Source: .Blood Blood  Preliminary Report (04 Dec 2022 20:02):    No growth to date.    Culture - Sputum (collected 03 Dec 2022 15:29)  Source: ET Tube ET Tube  Gram Stain (04 Dec 2022 05:13):    Rare polymorphonuclear leukocytes per low power field    No Squamous epithelial cells per low power field    No organisms seen per oil power field  Final Report (05 Dec 2022 17:30):    Normal Respiratory Christy present    Culture - Blood (collected 03 Dec 2022 14:18)  Source: .Blood Blood  Preliminary Report (04 Dec 2022 19:02):    No growth to date.

## 2022-12-06 NOTE — PROGRESS NOTE ADULT - SUBJECTIVE AND OBJECTIVE BOX
CRITICAL CARE ATTENDING - CTICU    MEDICATIONS  (STANDING):  argatroban Infusion 0.075 MICROgram(s)/kG/Min (0.42 mL/Hr) IV Continuous <Continuous>  artificial  tears Solution 1 Drop(s) Both EYES four times a day  atorvastatin 40 milliGRAM(s) Oral at bedtime  chlorhexidine 0.12% Liquid 15 milliLiter(s) Oral Mucosa every 12 hours  chlorhexidine 2% Cloths 1 Application(s) Topical <User Schedule>  CRRT Treatment    <Continuous>  dexMEDEtomidine Infusion 1.5 MICROgram(s)/kG/Hr (34.8 mL/Hr) IV Continuous <Continuous>  dextrose 50% Injectable 50 milliLiter(s) IV Push every 15 minutes  HYDROmorphone  Injectable 0.5 milliGRAM(s) IV Push every 6 hours  insulin regular Infusion 6 Unit(s)/Hr (6 mL/Hr) IV Continuous <Continuous>  norepinephrine Infusion 0.05 MICROgram(s)/kG/Min (8.71 mL/Hr) IV Continuous <Continuous>  pantoprazole  Injectable 40 milliGRAM(s) IV Push two times a day  polyethylene glycol 3350 17 Gram(s) Oral two times a day  PrismaSATE Dialysate BK 0 / 3.5 5000 milliLiter(s) (2000 mL/Hr) CRRT <Continuous>  PrismaSOL Filtration BGK 4 / 2.5 5000 milliLiter(s) (800 mL/Hr) CRRT <Continuous>  PrismaSOL Filtration BGK 4 / 2.5 5000 milliLiter(s) (200 mL/Hr) CRRT <Continuous>  propofol Infusion 17.94 MICROgram(s)/kG/Min (10 mL/Hr) IV Continuous <Continuous>  senna 2 Tablet(s) Oral at bedtime  sodium chloride 0.9%. 1000 milliLiter(s) (5 mL/Hr) IV Continuous <Continuous>                          8.3    16.49 )-----------( 162      ( 06 Dec 2022 00:40 )             25.3       12-06    143  |  105  |  61<H>  ----------------------------<  141<H>  4.0   |  23  |  2.51<H>    Ca    8.9      06 Dec 2022 00:40  Phos  4.6     12-  Mg     2.3     12-06    TPro  6.2  /  Alb  3.1<L>  /  TBili  1.4<H>  /  DBili  x   /  AST  67<H>  /  ALT  9<L>  /  AlkPhos  261<H>        PT/INR - ( 05 Dec 2022 13:57 )   PT: 17.7 sec;   INR: 1.52 ratio         PTT - ( 06 Dec 2022 05:16 )  PTT:45.1 sec    Mode: AC/ CMV (Assist Control/ Continuous Mandatory Ventilation)  RR (machine): 14  FiO2: 40  PEEP: 12  ITime: 1  MAP: 19  PC: 10  PIP: 23      Daily     Daily Weight in k.4 (06 Dec 2022 00:00)       @ 07:  -   @ 07:00  --------------------------------------------------------  IN: 2380.9 mL / OUT: 2615 mL / NET: -234.1 mL     @ 07:  -   @ 06:42  --------------------------------------------------------  IN: 1658 mL / OUT: 2735 mL / NET: -1077 mL        Critically Ill patient  : [ ] preoperative ,   [ ] post operative  [x] Non Operative    Requires :  [x ] Arterial Line   [ x] Central Line  [ ] PA catheter  [ ] IABP  [x ] ECMO  [ ] LVAD  [x ] Ventilator  [ ] pacemaker [ ] Impella.                      [x ] ABG's     [x ] Pulse Oxymetry Monitoring  Bedside evaluation , monitoring , treatment of hemodynamics , fluids , IVP/ IVCD meds.        Diagnosis:      - [facility] ECMO - arrest in SICU     MI     Acute Pancreatitis     ARDS     Fluid  overload     Post Arrest Shock state    Hypotension     Hemodynamic lability,  instability. Requires IVCD [x ] vasopressors [ ] inotropes  [x] [facility] ECMO  [ ] vasodilator  [x ]IVSS fluid  to maintain MAP, perfusion, C.I.     Cardiogenic Shock - resolving    Renal Failure - Acute Kidney Injury     CVVHD today     IVCD anticoagulation with [ ] Heparin  [x] Argatroban for     Hypernatremia     IVCD Insulin     Requires bedside physical therapy, mobilization and total prison care.     IVCD Precedex and IVCD Propofol for vent synchrony     Thrombocytopenia     Respiratory Failure     Requires chest PT, pulmonary toilet, ambu bagging, suctioning to maintain SaO2,  patent airway and treat atelectasis.     Ventilator Management:  [x ]AC-rest    [ ]CPAP-PS Wean    [ ]Trach Collar     [ ]Extubate    [ ] T-Piece  [ ]peep>5     Difficult weaning process - multiple organ system involvement in critically ill patient                 By signing my name below, I, Rosio Alonzo, attest that this documentation has been prepared under the direction and in the presence of Finn Zelaya MD.   Electronically Signed: Brian Burnett 22 @ 06:42      Discussed with CT surgeon, Physician Assistant - Nurse Practitioner- Critical care medicine team.   Discussed at  AM / PM rounds.   Chart, labs , films reviewed.    Cumulative Critical Care Time Given Today:  CRITICAL CARE ATTENDING - CTICU    MEDICATIONS  (STANDING):  argatroban Infusion 0.075 MICROgram(s)/kG/Min (0.42 mL/Hr) IV Continuous <Continuous>  artificial  tears Solution 1 Drop(s) Both EYES four times a day  atorvastatin 40 milliGRAM(s) Oral at bedtime  chlorhexidine 0.12% Liquid 15 milliLiter(s) Oral Mucosa every 12 hours  chlorhexidine 2% Cloths 1 Application(s) Topical <User Schedule>  CRRT Treatment    <Continuous>  dexMEDEtomidine Infusion 1.5 MICROgram(s)/kG/Hr (34.8 mL/Hr) IV Continuous <Continuous>  dextrose 50% Injectable 50 milliLiter(s) IV Push every 15 minutes  HYDROmorphone  Injectable 0.5 milliGRAM(s) IV Push every 6 hours  insulin regular Infusion 6 Unit(s)/Hr (6 mL/Hr) IV Continuous <Continuous>  norepinephrine Infusion 0.05 MICROgram(s)/kG/Min (8.71 mL/Hr) IV Continuous <Continuous>  pantoprazole  Injectable 40 milliGRAM(s) IV Push two times a day  polyethylene glycol 3350 17 Gram(s) Oral two times a day  PrismaSATE Dialysate BK 0 / 3.5 5000 milliLiter(s) (2000 mL/Hr) CRRT <Continuous>  PrismaSOL Filtration BGK 4 / 2.5 5000 milliLiter(s) (800 mL/Hr) CRRT <Continuous>  PrismaSOL Filtration BGK 4 / 2.5 5000 milliLiter(s) (200 mL/Hr) CRRT <Continuous>  propofol Infusion 17.94 MICROgram(s)/kG/Min (10 mL/Hr) IV Continuous <Continuous>  senna 2 Tablet(s) Oral at bedtime  sodium chloride 0.9%. 1000 milliLiter(s) (5 mL/Hr) IV Continuous <Continuous>                          8.3    16.49 )-----------( 162      ( 06 Dec 2022 00:40 )             25.3       12-06    143  |  105  |  61<H>  ----------------------------<  141<H>  4.0   |  23  |  2.51<H>    Ca    8.9      06 Dec 2022 00:40  Phos  4.6     12-  Mg     2.3     12-06    TPro  6.2  /  Alb  3.1<L>  /  TBili  1.4<H>  /  DBili  x   /  AST  67<H>  /  ALT  9<L>  /  AlkPhos  261<H>        PT/INR - ( 05 Dec 2022 13:57 )   PT: 17.7 sec;   INR: 1.52 ratio         PTT - ( 06 Dec 2022 05:16 )  PTT:45.1 sec    Mode: AC/ CMV (Assist Control/ Continuous Mandatory Ventilation)  RR (machine): 14  FiO2: 40  PEEP: 12  ITime: 1  MAP: 19  PC: 10  PIP: 23      Daily     Daily Weight in k.4 (06 Dec 2022 00:00)       @ 07:  -   @ 07:00  --------------------------------------------------------  IN: 2380.9 mL / OUT: 2615 mL / NET: -234.1 mL     @ 07:  -   @ 06:42  --------------------------------------------------------  IN: 1658 mL / OUT: 2735 mL / NET: -1077 mL        Critically Ill patient  : [ ] preoperative ,   [ ] post operative  [x] Non Operative    Requires :  [x ] Arterial Line   [ x] Central Line  [ ] PA catheter  [ ] IABP  [x ] ECMO  [ ] LVAD  [x ] Ventilator  [ ] pacemaker [ ] Impella.                      [x ] ABG's     [x ] Pulse Oxymetry Monitoring  Bedside evaluation , monitoring , treatment of hemodynamics , fluids , IVP/ IVCD meds.        Diagnosis:      - [facility] ECMO - arrest in SICU     MI     Acute Pancreatitis     ARDS     Fluid  overload     Post Arrest Shock state    Hypotension     Hemodynamic lability,  instability. Requires IVCD [x ] vasopressors [ ] inotropes  [x] [facility] ECMO  [ ] vasodilator  [x ]IVSS fluid  to maintain MAP, perfusion, C.I.     Cardiogenic Shock - resolving    Renal Failure - Acute Kidney Injury     CVVHD today     IVCD anticoagulation with [ ] Heparin  [x] Argatroban for     Hypernatremia     IVCD Insulin     Requires bedside physical therapy, mobilization and total intermediate care.     IVCD Precedex and IVCD Propofol for vent synchrony     Thrombocytopenia     Respiratory Failure     Requires chest PT, pulmonary toilet, ambu bagging, suctioning to maintain SaO2,  patent airway and treat atelectasis.     Ventilator Management:  [x ]AC-rest    [ ]CPAP-PS Wean    [ ]Trach Collar     [ ]Extubate    [ ] T-Piece  [ ]peep>5     Difficult weaning process - multiple organ system involvement in critically ill patient                 By signing my name below, I, Rosio Alonzo, attest that this documentation has been prepared under the direction and in the presence of Finn Zelaya MD.   Electronically Signed: Brian Burnett 22 @ 06:42      Discussed with CT surgeon, Physician Assistant - Nurse Practitioner- Critical care medicine team.   Discussed at  AM / PM rounds.   Chart, labs , films reviewed.    Cumulative Critical Care Time Given Today:  CRITICAL CARE ATTENDING - CTICU    MEDICATIONS  (STANDING):  argatroban Infusion 0.075 MICROgram(s)/kG/Min (0.42 mL/Hr) IV Continuous <Continuous>  artificial  tears Solution 1 Drop(s) Both EYES four times a day  atorvastatin 40 milliGRAM(s) Oral at bedtime  chlorhexidine 0.12% Liquid 15 milliLiter(s) Oral Mucosa every 12 hours  chlorhexidine 2% Cloths 1 Application(s) Topical <User Schedule>  CRRT Treatment    <Continuous>  dexMEDEtomidine Infusion 1.5 MICROgram(s)/kG/Hr (34.8 mL/Hr) IV Continuous <Continuous>  dextrose 50% Injectable 50 milliLiter(s) IV Push every 15 minutes  HYDROmorphone  Injectable 0.5 milliGRAM(s) IV Push every 6 hours  insulin regular Infusion 6 Unit(s)/Hr (6 mL/Hr) IV Continuous <Continuous>  norepinephrine Infusion 0.05 MICROgram(s)/kG/Min (8.71 mL/Hr) IV Continuous <Continuous>  pantoprazole  Injectable 40 milliGRAM(s) IV Push two times a day  polyethylene glycol 3350 17 Gram(s) Oral two times a day  PrismaSATE Dialysate BK 0 / 3.5 5000 milliLiter(s) (2000 mL/Hr) CRRT <Continuous>  PrismaSOL Filtration BGK 4 / 2.5 5000 milliLiter(s) (800 mL/Hr) CRRT <Continuous>  PrismaSOL Filtration BGK 4 / 2.5 5000 milliLiter(s) (200 mL/Hr) CRRT <Continuous>  propofol Infusion 17.94 MICROgram(s)/kG/Min (10 mL/Hr) IV Continuous <Continuous>  senna 2 Tablet(s) Oral at bedtime  sodium chloride 0.9%. 1000 milliLiter(s) (5 mL/Hr) IV Continuous <Continuous>                          8.3    16.49 )-----------( 162      ( 06 Dec 2022 00:40 )             25.3       12-06    143  |  105  |  61<H>  ----------------------------<  141<H>  4.0   |  23  |  2.51<H>    Ca    8.9      06 Dec 2022 00:40  Phos  4.6     12-  Mg     2.3     12-06    TPro  6.2  /  Alb  3.1<L>  /  TBili  1.4<H>  /  DBili  x   /  AST  67<H>  /  ALT  9<L>  /  AlkPhos  261<H>        PT/INR - ( 05 Dec 2022 13:57 )   PT: 17.7 sec;   INR: 1.52 ratio         PTT - ( 06 Dec 2022 05:16 )  PTT:45.1 sec    Mode: AC/ CMV (Assist Control/ Continuous Mandatory Ventilation)  RR (machine): 14  FiO2: 40  PEEP: 12  ITime: 1  MAP: 19  PC: 10  PIP: 23      Daily     Daily Weight in k.4 (06 Dec 2022 00:00)       @ 07:  -   @ 07:00  --------------------------------------------------------  IN: 2380.9 mL / OUT: 2615 mL / NET: -234.1 mL     @ 07:  -   @ 06:42  --------------------------------------------------------  IN: 1658 mL / OUT: 2735 mL / NET: -1077 mL        Critically Ill patient  : [ ] preoperative ,   [ ] post operative  [x] Non Operative    Requires :  [x ] Arterial Line   [ x] Central Line  [ ] PA catheter  [ ] IABP  [x ] ECMO  [ ] LVAD  [x ] Ventilator  [ ] pacemaker [ ] Impella.                      [x ] ABG's     [x ] Pulse Oxymetry Monitoring  Bedside evaluation , monitoring , treatment of hemodynamics , fluids , IVP/ IVCD meds.        Diagnosis:      - [facility] ECMO - arrest in SICU     MI     Acute Pancreatitis     ARDS     Fluid  overload     Post Arrest Shock state    Hypotension     Hemodynamic lability,  instability. Requires IVCD [x ] vasopressors [ ] inotropes  [x] [facility] ECMO  [ ] vasodilator  [x ]IVSS fluid  to maintain MAP, perfusion, C.I.     Cardiogenic Shock - resolving    Renal Failure - Acute Kidney Injury     CVVHD today     IVCD anticoagulation with [ ] Heparin  [x] Argatroban for     Hypernatremia     IVCD Insulin     Requires bedside physical therapy, mobilization and total nursing home care.     IVCD Precedex and IVCD Propofol for vent synchrony     Thrombocytopenia     Respiratory Failure     Requires chest PT, pulmonary toilet, ambu bagging, suctioning to maintain SaO2,  patent airway and treat atelectasis.     Ventilator Management:  [x ]AC-rest    [ ]CPAP-PS Wean    [ ]Trach Collar     [ ]Extubate    [ ] T-Piece  [ ]peep>5     Difficult weaning process - multiple organ system involvement in critically ill patient                 By signing my name below, I, Rosio Alonzo, attest that this documentation has been prepared under the direction and in the presence of Finn Zelaya MD.   Electronically Signed: Brian Burnett 22 @ 06:42      Discussed with CT surgeon, Physician Assistant - Nurse Practitioner- Critical care medicine team.   Discussed at  AM / PM rounds.   Chart, labs , films reviewed.    Cumulative Critical Care Time Given Today:  CRITICAL CARE ATTENDING - CTICU    MEDICATIONS  (STANDING):  argatroban Infusion 0.075 MICROgram(s)/kG/Min (0.42 mL/Hr) IV Continuous <Continuous>  artificial  tears Solution 1 Drop(s) Both EYES four times a day  atorvastatin 40 milliGRAM(s) Oral at bedtime  chlorhexidine 0.12% Liquid 15 milliLiter(s) Oral Mucosa every 12 hours  chlorhexidine 2% Cloths 1 Application(s) Topical <User Schedule>  CRRT Treatment    <Continuous>  dexMEDEtomidine Infusion 1.5 MICROgram(s)/kG/Hr (34.8 mL/Hr) IV Continuous <Continuous>  dextrose 50% Injectable 50 milliLiter(s) IV Push every 15 minutes  HYDROmorphone  Injectable 0.5 milliGRAM(s) IV Push every 6 hours  insulin regular Infusion 6 Unit(s)/Hr (6 mL/Hr) IV Continuous <Continuous>  norepinephrine Infusion 0.05 MICROgram(s)/kG/Min (8.71 mL/Hr) IV Continuous <Continuous>  pantoprazole  Injectable 40 milliGRAM(s) IV Push two times a day  polyethylene glycol 3350 17 Gram(s) Oral two times a day  PrismaSATE Dialysate BK 0 / 3.5 5000 milliLiter(s) (2000 mL/Hr) CRRT <Continuous>  PrismaSOL Filtration BGK 4 / 2.5 5000 milliLiter(s) (800 mL/Hr) CRRT <Continuous>  PrismaSOL Filtration BGK 4 / 2.5 5000 milliLiter(s) (200 mL/Hr) CRRT <Continuous>  propofol Infusion 17.94 MICROgram(s)/kG/Min (10 mL/Hr) IV Continuous <Continuous>  senna 2 Tablet(s) Oral at bedtime  sodium chloride 0.9%. 1000 milliLiter(s) (5 mL/Hr) IV Continuous <Continuous>                          8.3    16.49 )-----------( 162      ( 06 Dec 2022 00:40 )             25.3       12-06    143  |  105  |  61<H>  ----------------------------<  141<H>  4.0   |  23  |  2.51<H>    Ca    8.9      06 Dec 2022 00:40  Phos  4.6     12-  Mg     2.3     12-06    TPro  6.2  /  Alb  3.1<L>  /  TBili  1.4<H>  /  DBili  x   /  AST  67<H>  /  ALT  9<L>  /  AlkPhos  261<H>        PT/INR - ( 05 Dec 2022 13:57 )   PT: 17.7 sec;   INR: 1.52 ratio         PTT - ( 06 Dec 2022 05:16 )  PTT:45.1 sec    Mode: AC/ CMV (Assist Control/ Continuous Mandatory Ventilation)  RR (machine): 14  FiO2: 40  PEEP: 12  ITime: 1  MAP: 19  PC: 10  PIP: 23      Daily     Daily Weight in k.4 (06 Dec 2022 00:00)       @ 07:  -   @ 07:00  --------------------------------------------------------  IN: 2380.9 mL / OUT: 2615 mL / NET: -234.1 mL     @ 07:  -   @ 06:42  --------------------------------------------------------  IN: 1658 mL / OUT: 2735 mL / NET: -1077 mL        Critically Ill patient  : [ ] preoperative ,   [ ] post operative  [x] Non Operative    Requires :  [x ] Arterial Line   [ x] Central Line  [ ] PA catheter  [ ] IABP  [x ] ECMO  [ ] LVAD  [x ] Ventilator  [ ] pacemaker [ ] Impella.                      [x ] ABG's     [x ] Pulse Oxymetry Monitoring  Bedside evaluation , monitoring , treatment of hemodynamics , fluids , IVP/ IVCD meds.        Diagnosis:      - [facility] ECMO - arrest in SICU     MI     Acute Pancreatitis     ARDS     Fluid  overload     Post Arrest Shock state    Hypotension     Hemodynamic lability,  instability. Requires IVCD [x ] vasopressors [ ] inotropes  [x] VA ECMO  [ ] vasodilator  [x ]IVSS fluid  to maintain MAP, perfusion, C.I.     Cardiogenic Shock - resolving    Renal Failure - Acute Kidney Injury     CVVHD today     IVCD anticoagulation with [ ] Heparin  [x] Argatroban for  ECMO / MI    Hypernatremia     IVCD Insulin     Requires bedside physical therapy, mobilization and total halfway care.     IVCD Precedex  for vent synchrony     Thrombocytopenia     Respiratory Failure     Requires chest PT, pulmonary toilet, ambu bagging, suctioning to maintain SaO2,  patent airway and treat atelectasis.     Ventilator Management:  [x ]AC-rest    [ ]CPAP-PS Wean    [ ]Trach Collar     [ ]Extubate    [ ] T-Piece  [ ]peep>5     Difficult weaning process - multiple organ system involvement in critically ill patient     For Cardiac Cath                By signing my name below, IRosio, attest that this documentation has been prepared under the direction and in the presence of Finn Zelaya MD.   Electronically Signed: Brian Burnett 22 @ 06:42    IFinn, personally performed the services described in this documentation. All medical record entries made by the scribe were at my direction and in my presence. I have reviewed the chart and agree that the record reflects my personal performance and is accurate and complete.   Finn Zelaya MD.       Discussed with CT surgeon, Physician Assistant - Nurse Practitioner- Critical care medicine team.   Discussed at  AM / PM rounds.   Chart, labs , films reviewed.    Cumulative Critical Care Time Given Today:  90 min CRITICAL CARE ATTENDING - CTICU    MEDICATIONS  (STANDING):  argatroban Infusion 0.075 MICROgram(s)/kG/Min (0.42 mL/Hr) IV Continuous <Continuous>  artificial  tears Solution 1 Drop(s) Both EYES four times a day  atorvastatin 40 milliGRAM(s) Oral at bedtime  chlorhexidine 0.12% Liquid 15 milliLiter(s) Oral Mucosa every 12 hours  chlorhexidine 2% Cloths 1 Application(s) Topical <User Schedule>  CRRT Treatment    <Continuous>  dexMEDEtomidine Infusion 1.5 MICROgram(s)/kG/Hr (34.8 mL/Hr) IV Continuous <Continuous>  dextrose 50% Injectable 50 milliLiter(s) IV Push every 15 minutes  HYDROmorphone  Injectable 0.5 milliGRAM(s) IV Push every 6 hours  insulin regular Infusion 6 Unit(s)/Hr (6 mL/Hr) IV Continuous <Continuous>  norepinephrine Infusion 0.05 MICROgram(s)/kG/Min (8.71 mL/Hr) IV Continuous <Continuous>  pantoprazole  Injectable 40 milliGRAM(s) IV Push two times a day  polyethylene glycol 3350 17 Gram(s) Oral two times a day  PrismaSATE Dialysate BK 0 / 3.5 5000 milliLiter(s) (2000 mL/Hr) CRRT <Continuous>  PrismaSOL Filtration BGK 4 / 2.5 5000 milliLiter(s) (800 mL/Hr) CRRT <Continuous>  PrismaSOL Filtration BGK 4 / 2.5 5000 milliLiter(s) (200 mL/Hr) CRRT <Continuous>  propofol Infusion 17.94 MICROgram(s)/kG/Min (10 mL/Hr) IV Continuous <Continuous>  senna 2 Tablet(s) Oral at bedtime  sodium chloride 0.9%. 1000 milliLiter(s) (5 mL/Hr) IV Continuous <Continuous>                          8.3    16.49 )-----------( 162      ( 06 Dec 2022 00:40 )             25.3       12-06    143  |  105  |  61<H>  ----------------------------<  141<H>  4.0   |  23  |  2.51<H>    Ca    8.9      06 Dec 2022 00:40  Phos  4.6     12-  Mg     2.3     12-06    TPro  6.2  /  Alb  3.1<L>  /  TBili  1.4<H>  /  DBili  x   /  AST  67<H>  /  ALT  9<L>  /  AlkPhos  261<H>        PT/INR - ( 05 Dec 2022 13:57 )   PT: 17.7 sec;   INR: 1.52 ratio         PTT - ( 06 Dec 2022 05:16 )  PTT:45.1 sec    Mode: AC/ CMV (Assist Control/ Continuous Mandatory Ventilation)  RR (machine): 14  FiO2: 40  PEEP: 12  ITime: 1  MAP: 19  PC: 10  PIP: 23      Daily     Daily Weight in k.4 (06 Dec 2022 00:00)       @ 07:  -   @ 07:00  --------------------------------------------------------  IN: 2380.9 mL / OUT: 2615 mL / NET: -234.1 mL     @ 07:  -   @ 06:42  --------------------------------------------------------  IN: 1658 mL / OUT: 2735 mL / NET: -1077 mL        Critically Ill patient  : [ ] preoperative ,   [ ] post operative  [x] Non Operative    Requires :  [x ] Arterial Line   [ x] Central Line  [ ] PA catheter  [ ] IABP  [x ] ECMO  [ ] LVAD  [x ] Ventilator  [ ] pacemaker [ ] Impella.                      [x ] ABG's     [x ] Pulse Oxymetry Monitoring  Bedside evaluation , monitoring , treatment of hemodynamics , fluids , IVP/ IVCD meds.        Diagnosis:      - [facility] ECMO - arrest in SICU     MI     Acute Pancreatitis     ARDS     Fluid  overload     Post Arrest Shock state    Hypotension     Hemodynamic lability,  instability. Requires IVCD [x ] vasopressors [ ] inotropes  [x] VA ECMO  [ ] vasodilator  [x ]IVSS fluid  to maintain MAP, perfusion, C.I.     Cardiogenic Shock - resolving    Renal Failure - Acute Kidney Injury     CVVHD today     IVCD anticoagulation with [ ] Heparin  [x] Argatroban for  ECMO / MI    Hypernatremia     IVCD Insulin     Requires bedside physical therapy, mobilization and total detention care.     IVCD Precedex  for vent synchrony     Thrombocytopenia     Respiratory Failure     Requires chest PT, pulmonary toilet, ambu bagging, suctioning to maintain SaO2,  patent airway and treat atelectasis.     Ventilator Management:  [x ]AC-rest    [ ]CPAP-PS Wean    [ ]Trach Collar     [ ]Extubate    [ ] T-Piece  [ ]peep>5     Difficult weaning process - multiple organ system involvement in critically ill patient     For Cardiac Cath                By signing my name below, IRosio, attest that this documentation has been prepared under the direction and in the presence of Finn Zelaya MD.   Electronically Signed: Brian Burnett 22 @ 06:42    IFinn, personally performed the services described in this documentation. All medical record entries made by the scribe were at my direction and in my presence. I have reviewed the chart and agree that the record reflects my personal performance and is accurate and complete.   Finn Zelaya MD.       Discussed with CT surgeon, Physician Assistant - Nurse Practitioner- Critical care medicine team.   Discussed at  AM / PM rounds.   Chart, labs , films reviewed.    Cumulative Critical Care Time Given Today:  90 min CRITICAL CARE ATTENDING - CTICU    MEDICATIONS  (STANDING):  argatroban Infusion 0.075 MICROgram(s)/kG/Min (0.42 mL/Hr) IV Continuous <Continuous>  artificial  tears Solution 1 Drop(s) Both EYES four times a day  atorvastatin 40 milliGRAM(s) Oral at bedtime  chlorhexidine 0.12% Liquid 15 milliLiter(s) Oral Mucosa every 12 hours  chlorhexidine 2% Cloths 1 Application(s) Topical <User Schedule>  CRRT Treatment    <Continuous>  dexMEDEtomidine Infusion 1.5 MICROgram(s)/kG/Hr (34.8 mL/Hr) IV Continuous <Continuous>  dextrose 50% Injectable 50 milliLiter(s) IV Push every 15 minutes  HYDROmorphone  Injectable 0.5 milliGRAM(s) IV Push every 6 hours  insulin regular Infusion 6 Unit(s)/Hr (6 mL/Hr) IV Continuous <Continuous>  norepinephrine Infusion 0.05 MICROgram(s)/kG/Min (8.71 mL/Hr) IV Continuous <Continuous>  pantoprazole  Injectable 40 milliGRAM(s) IV Push two times a day  polyethylene glycol 3350 17 Gram(s) Oral two times a day  PrismaSATE Dialysate BK 0 / 3.5 5000 milliLiter(s) (2000 mL/Hr) CRRT <Continuous>  PrismaSOL Filtration BGK 4 / 2.5 5000 milliLiter(s) (800 mL/Hr) CRRT <Continuous>  PrismaSOL Filtration BGK 4 / 2.5 5000 milliLiter(s) (200 mL/Hr) CRRT <Continuous>  propofol Infusion 17.94 MICROgram(s)/kG/Min (10 mL/Hr) IV Continuous <Continuous>  senna 2 Tablet(s) Oral at bedtime  sodium chloride 0.9%. 1000 milliLiter(s) (5 mL/Hr) IV Continuous <Continuous>                          8.3    16.49 )-----------( 162      ( 06 Dec 2022 00:40 )             25.3       12-06    143  |  105  |  61<H>  ----------------------------<  141<H>  4.0   |  23  |  2.51<H>    Ca    8.9      06 Dec 2022 00:40  Phos  4.6     12-  Mg     2.3     12-06    TPro  6.2  /  Alb  3.1<L>  /  TBili  1.4<H>  /  DBili  x   /  AST  67<H>  /  ALT  9<L>  /  AlkPhos  261<H>        PT/INR - ( 05 Dec 2022 13:57 )   PT: 17.7 sec;   INR: 1.52 ratio         PTT - ( 06 Dec 2022 05:16 )  PTT:45.1 sec    Mode: AC/ CMV (Assist Control/ Continuous Mandatory Ventilation)  RR (machine): 14  FiO2: 40  PEEP: 12  ITime: 1  MAP: 19  PC: 10  PIP: 23      Daily     Daily Weight in k.4 (06 Dec 2022 00:00)       @ 07:  -   @ 07:00  --------------------------------------------------------  IN: 2380.9 mL / OUT: 2615 mL / NET: -234.1 mL     @ 07:  -   @ 06:42  --------------------------------------------------------  IN: 1658 mL / OUT: 2735 mL / NET: -1077 mL        Critically Ill patient  : [ ] preoperative ,   [ ] post operative  [x] Non Operative    Requires :  [x ] Arterial Line   [ x] Central Line  [ ] PA catheter  [ ] IABP  [x ] ECMO  [ ] LVAD  [x ] Ventilator  [ ] pacemaker [ ] Impella.                      [x ] ABG's     [x ] Pulse Oxymetry Monitoring  Bedside evaluation , monitoring , treatment of hemodynamics , fluids , IVP/ IVCD meds.        Diagnosis:      - VA ECMO - arrest in SICU     MI     Acute Pancreatitis     ARDS     Fluid  overload     Post Arrest Shock state    Hypotension     Hemodynamic lability,  instability. Requires IVCD [x ] vasopressors [ ] inotropes  [x] VA ECMO  [ ] vasodilator  [x ]IVSS fluid  to maintain MAP, perfusion, C.I.     Cardiogenic Shock - resolving    Renal Failure - Acute Kidney Injury     CVVHD today     IVCD anticoagulation with [ ] Heparin  [x] Argatroban for  ECMO / MI    Hypernatremia     IVCD Insulin     Requires bedside physical therapy, mobilization and total care home care.     IVCD Precedex  for vent synchrony     Thrombocytopenia     Respiratory Failure     Requires chest PT, pulmonary toilet, ambu bagging, suctioning to maintain SaO2,  patent airway and treat atelectasis.     Ventilator Management:  [x ]AC-rest    [ ]CPAP-PS Wean    [ ]Trach Collar     [ ]Extubate    [ ] T-Piece  [ ]peep>5     Difficult weaning process - multiple organ system involvement in critically ill patient     For Cardiac Cath                By signing my name below, IRosio, attest that this documentation has been prepared under the direction and in the presence of Finn Zelaya MD.   Electronically Signed: Brian Burnett 22 @ 06:42    IFinn, personally performed the services described in this documentation. All medical record entries made by the scribe were at my direction and in my presence. I have reviewed the chart and agree that the record reflects my personal performance and is accurate and complete.   Finn Zelaya MD.       Discussed with CT surgeon, Physician Assistant - Nurse Practitioner- Critical care medicine team.   Discussed at  AM / PM rounds.   Chart, labs , films reviewed.    Cumulative Critical Care Time Given Today:  90 min

## 2022-12-06 NOTE — PROGRESS NOTE ADULT - TIME BILLING
See the [facility] note above. See the [facility] note above. See the [facility] note above.

## 2022-12-06 NOTE — PROGRESS NOTE ADULT - PROBLEM SELECTOR PLAN 3
- likely arrest associated ATN and now worsened from hypovolemia   - stop diuretics and aim to keep net even to positive   - renal following, c/t CVVH

## 2022-12-06 NOTE — PROGRESS NOTE ADULT - PROBLEM SELECTOR PLAN 5
- small 3mm subdural hemorrhage stable on repeat CTs  - okay to continue argatroban, appreciate neuro recs  - keep MAP < 75 mmHg and careful monitoring of PTT  - repeat CT head today to assess for interval changes

## 2022-12-06 NOTE — PROGRESS NOTE ADULT - SUBJECTIVE AND OBJECTIVE BOX
Patient seen and examined at the bedside.    Remained critically ill on continuous ICU monitoring.    Today  - IABP placed today  - ECMO weaning     ROS:  Gen: No fever  EYES/ENT: No visual changes;  No vertigo or throat pain   NECK: No pain   RES:  No shortness of breath or Cough  CARD: No chest pain   GI: No abdominal pain  : No dysuria  NEURO: No weakness  SKIN: No itching, rashes     OBJECTIVE:  Vital Signs Last 24 Hrs  T(C): 36.4 (06 Dec 2022 20:00), Max: 36.9 (06 Dec 2022 16:00)  T(F): 97.5 (06 Dec 2022 20:00), Max: 98.4 (06 Dec 2022 16:00)  HR: 87 (06 Dec 2022 21:30) (84 - 184)  BP: --  BP(mean): --  RR: 14 (06 Dec 2022 21:30) (3 - 26)  SpO2: 100% (06 Dec 2022 21:30) (100% - 100%)    Parameters below as of 06 Dec 2022 20:00  Patient On (Oxygen Delivery Method): ventilator    O2 Concentration (%): 40      Physical Exam:   General: Intubated, multiple lines gtt  Neurology: Sedated, off sedation follows simple commands, COBB x4  Eyes: bilateral pupils equal and reactive   ENT/Neck: +ETT midline, Neck supple, trachea midline, No JVD   Respiratory: rhonchi bilaterally   CV: S1S2, no murmurs        [x] Sinus rhythm  Abdominal: Softly distended,+BS   Extremities: 1+ pedal edema noted, + peripheral pulses palpable, warm  Skin: No Rashes, Hematoma, Ecchymosis                      Assessment:  61 y/o male with PMH of CABG, DM, HTN, HLD presenting as transfer from [place] for c/f STEMI. PTA arrival received 325 aspirin, 600 plavix, and no heparin drip started. Around 1:30 am patient had multiple episodes of non-bloody diarrhea and emesis with associated abdominal pain and chest pain, unable to localize, non-radiating, with associated SOB and no associated palpitations or diaphoresis. No recent fevers/chills, cough, rashes, or changes in urination. Does report mild diffuse back pain; started 5 days ago in setting on injury while walking and lifting objects. Denies focal weakness, numbness/tingling, or LOC due does report mild dizziness.    acute rspiratory distress , cardiogenic shock s/p VA ECMO 11/25  Hemodynamic instability  Hypovolemia  Acute blood loss anemia  acute pancreatitis        Plan:   ***Neuro***  CT head showed 4mm subdural hematoma 11/26: repeat CT head unchanged 12/01  Plan for additional Repeat CT for Pancreatitis assessment   [x] Sedated with [x] Precedex   Propofol weaned off   Post operative neuro assessment   PRN Dilaudid for pain      ***Cardiovascular***  12/3 TTE: EF 30-35%, moderate-severe LV dysfunction  Invasive hemodynamic monitoring, assess perfusion indices   SR / CVP 3/ MAP 73/ Hct 25.6/ Lactate 1.1  [x] Levophed 0.05 mcg/kg/min, MAP goal 65-75  [x] L Femoral IABP put in today with good augmentation @ 1:1 [x] ECMO- 3600 rpm, 4.07 flow--> Potential wean   Continuos reassessment of hemodynamics   [x] Statin  [x] AC Therapy with Argatroban gtt for ECMO circuit (PTT 40-45)  cardiac cath tomorrow       ***Pulmonary***  Post op vent management   Titration of FiO2 and PEEP, follow SpO2, CXR, blood gasses   100% FiO2 on ECMO  Nitric oxide weaned off 12/3    Mode: AC/ CMV (Assist Control/ Continuous Mandatory Ventilation)  RR (machine): 14  FiO2: 40  PEEP: 12  ITime: 1  MAP: 19  PC: 10  PIP: 23              ***GI***  [x] NPO with TF's Vital AF @ 20cc/hr (@goal)  [x] Protonix    Bowel regimen with Miralax and senna       ***Renal***  ERIC, renal following  Restarted CVVHD yesterday   Even fluid goal  Continue to monitor I/Os, BUN/Creatinine.   Replete lytes PRN  Va present  hypernatremia, Free Water [id number]      ***ID***  No active antibiotic coverage      ***Endocrine***  [x]  DM2: HbA1c 7.3%                - [x] Insulin gtt              - Need tight glycemic control to prevent wound infection.    ***GOC***  Next family meeting 12/6 @ 130pm        Patient requires continuous monitoring with bedside rhythm monitoring, pulse oximetry monitoring, and continuous central venous and arterial pressure monitoring; and intermittent blood gas analysis. Care plan discussed with the ICU care team.   Patient remained critical, at risk for life threatening decompensation.    I have spent 35 minutes providing critical care management to this patient.    By signing my name below, I, Abhijit Ngo, attest that this documentation has been prepared under the direction and in the presence of BROOKE Spicer   Electronically signed: Brian Slaughter, 12-06-22 @ 21:35    I, BROOKE Spicer, personally performed the services described in this documentation. all medical record entries made by the karlaibe were at my direction and in my presence. I have reviewed the chart and agree that the record reflects my personal performance and is accurate and complete  Electronically signed: BROOKE Spicer  Patient seen and examined at the bedside.    Remained critically ill on continuous ICU monitoring.    Today  - IABP placed today  - ECMO weaning     ROS:  Gen: No fever  EYES/ENT: No visual changes;  No vertigo or throat pain   NECK: No pain   RES:  No shortness of breath or Cough  CARD: No chest pain   GI: No abdominal pain  : No dysuria  NEURO: No weakness  SKIN: No itching, rashes     OBJECTIVE:  Vital Signs Last 24 Hrs  T(C): 36.4 (06 Dec 2022 20:00), Max: 36.9 (06 Dec 2022 16:00)  T(F): 97.5 (06 Dec 2022 20:00), Max: 98.4 (06 Dec 2022 16:00)  HR: 87 (06 Dec 2022 21:30) (84 - 184)  BP: --  BP(mean): --  RR: 14 (06 Dec 2022 21:30) (3 - 26)  SpO2: 100% (06 Dec 2022 21:30) (100% - 100%)    Parameters below as of 06 Dec 2022 20:00  Patient On (Oxygen Delivery Method): ventilator    O2 Concentration (%): 40      Physical Exam:   General: Intubated, multiple lines gtt  Neurology: Sedated, off sedation follows simple commands, COBB x4  Eyes: bilateral pupils equal and reactive   ENT/Neck: +ETT midline, Neck supple, trachea midline, No JVD   Respiratory: rhonchi bilaterally   CV: S1S2, no murmurs        [x] Sinus rhythm  Abdominal: Softly distended,+BS   Extremities: 1+ pedal edema noted, + peripheral pulses palpable, warm  Skin: No Rashes, Hematoma, Ecchymosis                      Assessment:  63 y/o male with PMH of CABG, DM, HTN, HLD presenting as transfer from [place] for c/f STEMI. PTA arrival received 325 aspirin, 600 plavix, and no heparin drip started. Around 1:30 am patient had multiple episodes of non-bloody diarrhea and emesis with associated abdominal pain and chest pain, unable to localize, non-radiating, with associated SOB and no associated palpitations or diaphoresis. No recent fevers/chills, cough, rashes, or changes in urination. Does report mild diffuse back pain; started 5 days ago in setting on injury while walking and lifting objects. Denies focal weakness, numbness/tingling, or LOC due does report mild dizziness.    acute rspiratory distress , cardiogenic shock s/p VA ECMO 11/25  Hemodynamic instability  Hypovolemia  Acute blood loss anemia  acute pancreatitis        Plan:   ***Neuro***  CT head showed 4mm subdural hematoma 11/26: repeat CT head unchanged 12/01  Plan for additional Repeat CT for Pancreatitis assessment   [x] Sedated with [x] Precedex   Propofol weaned off   Post operative neuro assessment   PRN Dilaudid for pain      ***Cardiovascular***  12/3 TTE: EF 30-35%, moderate-severe LV dysfunction  Invasive hemodynamic monitoring, assess perfusion indices   SR / CVP 3/ MAP 73/ Hct 25.6/ Lactate 1.1  [x] Levophed 0.05 mcg/kg/min, MAP goal 65-75  [x] L Femoral IABP put in today with good augmentation @ 1:1 [x] ECMO- 3600 rpm, 4.07 flow--> Potential wean   Continuos reassessment of hemodynamics   [x] Statin  [x] AC Therapy with Argatroban gtt for ECMO circuit (PTT 40-45)  cardiac cath tomorrow       ***Pulmonary***  Post op vent management   Titration of FiO2 and PEEP, follow SpO2, CXR, blood gasses   100% FiO2 on ECMO  Nitric oxide weaned off 12/3    Mode: AC/ CMV (Assist Control/ Continuous Mandatory Ventilation)  RR (machine): 14  FiO2: 40  PEEP: 12  ITime: 1  MAP: 19  PC: 10  PIP: 23              ***GI***  [x] NPO with TF's Vital AF @ 20cc/hr (@goal)  [x] Protonix    Bowel regimen with Miralax and senna       ***Renal***  ERIC, renal following  Restarted CVVHD yesterday   Even fluid goal  Continue to monitor I/Os, BUN/Creatinine.   Replete lytes PRN  Va present  hypernatremia, Free Water [id number]      ***ID***  No active antibiotic coverage      ***Endocrine***  [x]  DM2: HbA1c 7.3%                - [x] Insulin gtt              - Need tight glycemic control to prevent wound infection.    ***GOC***  Next family meeting 12/6 @ 130pm        Patient requires continuous monitoring with bedside rhythm monitoring, pulse oximetry monitoring, and continuous central venous and arterial pressure monitoring; and intermittent blood gas analysis. Care plan discussed with the ICU care team.   Patient remained critical, at risk for life threatening decompensation.    I have spent 35 minutes providing critical care management to this patient.    By signing my name below, I, Abhijit Ngo, attest that this documentation has been prepared under the direction and in the presence of BROOKE Spicer   Electronically signed: Brian Slaughter, 12-06-22 @ 21:35    I, BROOKE Spicer, personally performed the services described in this documentation. all medical record entries made by the karlaibe were at my direction and in my presence. I have reviewed the chart and agree that the record reflects my personal performance and is accurate and complete  Electronically signed: BROOKE Spicer  Patient seen and examined at the bedside.    Remained critically ill on continuous ICU monitoring.    Today  - IABP placed today  - ECMO weaning     ROS:  Gen: No fever  EYES/ENT: No visual changes;  No vertigo or throat pain   NECK: No pain   RES:  No shortness of breath or Cough  CARD: No chest pain   GI: No abdominal pain  : No dysuria  NEURO: No weakness  SKIN: No itching, rashes     OBJECTIVE:  Vital Signs Last 24 Hrs  T(C): 36.4 (06 Dec 2022 20:00), Max: 36.9 (06 Dec 2022 16:00)  T(F): 97.5 (06 Dec 2022 20:00), Max: 98.4 (06 Dec 2022 16:00)  HR: 87 (06 Dec 2022 21:30) (84 - 184)  BP: --  BP(mean): --  RR: 14 (06 Dec 2022 21:30) (3 - 26)  SpO2: 100% (06 Dec 2022 21:30) (100% - 100%)    Parameters below as of 06 Dec 2022 20:00  Patient On (Oxygen Delivery Method): ventilator    O2 Concentration (%): 40      Physical Exam:   General: Intubated, multiple lines gtt  Neurology: Sedated, off sedation follows simple commands, COBB x4  Eyes: bilateral pupils equal and reactive   ENT/Neck: +ETT midline, Neck supple, trachea midline, No JVD   Respiratory: rhonchi bilaterally   CV: S1S2, no murmurs        [x] Sinus rhythm  Abdominal: Softly distended,+BS   Extremities: 1+ pedal edema noted, + peripheral pulses palpable, warm  Skin: No Rashes, Hematoma, Ecchymosis                      Assessment:  63 y/o male with PMH of CABG, DM, HTN, HLD presenting as transfer from [place] for c/f STEMI. PTA arrival received 325 aspirin, 600 plavix, and no heparin drip started. Around 1:30 am patient had multiple episodes of non-bloody diarrhea and emesis with associated abdominal pain and chest pain, unable to localize, non-radiating, with associated SOB and no associated palpitations or diaphoresis. No recent fevers/chills, cough, rashes, or changes in urination. Does report mild diffuse back pain; started 5 days ago in setting on injury while walking and lifting objects. Denies focal weakness, numbness/tingling, or LOC due does report mild dizziness.    acute rspiratory distress , cardiogenic shock s/p VA ECMO 11/25  Hemodynamic instability  Hypovolemia  Acute blood loss anemia  acute pancreatitis        Plan:   ***Neuro***  CT head showed 4mm subdural hematoma 11/26: repeat CT head unchanged 12/01  Plan for additional Repeat CT for Pancreatitis assessment   [x] Sedated with [x] Precedex   Propofol weaned off   Post operative neuro assessment   PRN Dilaudid for pain      ***Cardiovascular***  12/3 TTE: EF 30-35%, moderate-severe LV dysfunction  Invasive hemodynamic monitoring, assess perfusion indices   SR / CVP 3/ MAP 73/ Hct 25.6/ Lactate 1.1  [x] Levophed 0.05 mcg/kg/min, MAP goal 65-75  [x] L Femoral IABP put in today with good augmentation @ 1:1 [x] ECMO- 3600 rpm, 4.07 flow--> Potential wean   Continuos reassessment of hemodynamics   [x] Statin  [x] AC Therapy with Argatroban gtt for ECMO circuit (PTT 40-45)  cardiac cath tomorrow       ***Pulmonary***  Post op vent management   Titration of FiO2 and PEEP, follow SpO2, CXR, blood gasses   100% FiO2 on ECMO  Nitric oxide weaned off 12/3    Mode: AC/ CMV (Assist Control/ Continuous Mandatory Ventilation)  RR (machine): 14  FiO2: 40  PEEP: 12  ITime: 1  MAP: 19  PC: 10  PIP: 23              ***GI***  [x] NPO with TF's Vital AF @ 20cc/hr (@goal)  [x] Protonix    Bowel regimen with Miralax and senna       ***Renal***  ERIC, renal following  Restarted CVVHD yesterday   Even fluid goal  Continue to monitor I/Os, BUN/Creatinine.   Replete lytes PRN  Va present  hypernatremia, Free Water [id number]      ***ID***  No active antibiotic coverage      ***Endocrine***  [x]  DM2: HbA1c 7.3%                - [x] Insulin gtt              - Need tight glycemic control to prevent wound infection.    ***GOC***  Next family meeting 12/6 @ 130pm        Patient requires continuous monitoring with bedside rhythm monitoring, pulse oximetry monitoring, and continuous central venous and arterial pressure monitoring; and intermittent blood gas analysis. Care plan discussed with the ICU care team.   Patient remained critical, at risk for life threatening decompensation.    I have spent 35 minutes providing critical care management to this patient.    By signing my name below, I, Abhijit Ngo, attest that this documentation has been prepared under the direction and in the presence of BROOKE Spicer   Electronically signed: Brian Slaughter, 12-06-22 @ 21:35    I, BROOKE Spicer, personally performed the services described in this documentation. all medical record entries made by the karlaibe were at my direction and in my presence. I have reviewed the chart and agree that the record reflects my personal performance and is accurate and complete  Electronically signed: BROOKE Spicer  Patient seen and examined at the bedside.    Remained critically ill on continuous ICU monitoring.    Today  - Cardiac cath done, LIMA graft is patent, but other grafts occluded. IABP placed  - Levophed drip and VA ECMO and IABP for cardiogenic shock  - Precedex drip for ventilator synchrony  - Argatroban drip for VA ECMO and IABP  - CVVH, removing -50cc/hr  - Increasing tube feeds to goal of 60cc/hr    ROS: Unable to obtain as patient is intubated and sedated    OBJECTIVE:  Vital Signs Last 24 Hrs  T(C): 36.4 (06 Dec 2022 20:00), Max: 36.9 (06 Dec 2022 16:00)  T(F): 97.5 (06 Dec 2022 20:00), Max: 98.4 (06 Dec 2022 16:00)  HR: 87 (06 Dec 2022 21:30) (84 - 184)  BP: --  BP(mean): --  RR: 14 (06 Dec 2022 21:30) (3 - 26)  SpO2: 100% (06 Dec 2022 21:30) (100% - 100%)    Parameters below as of 06 Dec 2022 20:00  Patient On (Oxygen Delivery Method): ventilator    O2 Concentration (%): 40      Physical Exam:   General: Intubated, multiple lines gtt  Neurology: Sedated, off sedation follows simple commands, COBB x4  Eyes: bilateral pupils equal and reactive   ENT/Neck: +ETT midline, Neck supple, trachea midline, No JVD   Respiratory: rhonchi bilaterally   CV: S1S2, no murmurs        [x] Sinus rhythm  Abdominal: Softly distended,+BS   Extremities: 1+ pedal edema noted, + peripheral pulses palpable, warm  Skin: No Rashes, Hematoma, Ecchymosis                      Assessment:  63 y/o male with PMH of CABG, DM, HTN, HLD presenting as transfer from [place] for c/f STEMI. PTA arrival received 325 aspirin, 600 plavix, and no heparin drip started. Around 1:30 am patient had multiple episodes of non-bloody diarrhea and emesis with associated abdominal pain and chest pain, unable to localize, non-radiating, with associated SOB and no associated palpitations or diaphoresis. No recent fevers/chills, cough, rashes, or changes in urination. Does report mild diffuse back pain; started 5 days ago in setting on injury while walking and lifting objects. Denies focal weakness, numbness/tingling, or LOC due does report mild dizziness.    acute rspiratory distress , cardiogenic shock s/p VA ECMO 11/25  Hemodynamic instability  Hypovolemia  Acute blood loss anemia  acute pancreatitis        Plan:   ***Neuro***  CT head showed 4mm subdural hematoma 11/26: repeat CT head unchanged 12/01  Plan for additional Repeat CT for Pancreatitis assessment   [x] Sedated with [x] Precedex   Propofol weaned off   Post operative neuro assessment   PRN Dilaudid for pain      ***Cardiovascular***  12/3 TTE: EF 30-35%, moderate-severe LV dysfunction  Invasive hemodynamic monitoring, assess perfusion indices   SR / CVP 3/ MAP 73/ Hct 25.6/ Lactate 1.1  [x] Levophed 0.05 mcg/kg/min, MAP goal 65-75  [x] L Femoral IABP put in today with good augmentation @ 1:1 [x] ECMO- 3600 rpm, 4.07 flow--> Potential wean in future  Continuos reassessment of hemodynamics   [x] Statin  [x] AC Therapy with Argatroban gtt for ECMO circuit (PTT 40-45)  cardiac cath today showed patent LIMA graft and other grafts occluded      ***Pulmonary***  Post op vent management   Titration of FiO2 and PEEP, follow SpO2, CXR, blood gasses   100% FiO2 on ECMO  Nitric oxide weaned off 12/3    Mode: AC/ CMV (Assist Control/ Continuous Mandatory Ventilation)  RR (machine): 14  FiO2: 40  PEEP: 12  ITime: 1  MAP: 19  PC: 10  PIP: 23              ***GI***  [x] NPO with TF's Vital AF, increasing to goal 60cc/hr  [x] Protonix    Bowel regimen with Miralax and senna       ***Renal***  ERIC, renal following  Restarted CVVHD yesterday   Even fluid goal  Continue to monitor I/Os, BUN/Creatinine.   Replete lytes PRN  De Dios present      ***ID***  No active antibiotic coverage      ***Endocrine***  [x]  DM2: HbA1c 7.3%                - [x] Insulin gtt              - Need tight glycemic control to prevent wound infection.    ***GOC***  Next family meeting 12/13 at 1:30PM        Patient requires continuous monitoring with bedside rhythm monitoring, pulse oximetry monitoring, and continuous central venous and arterial pressure monitoring; and intermittent blood gas analysis. Care plan discussed with the ICU care team.   Patient remained critical, at risk for life threatening decompensation.    I have spent 40 minutes providing critical care management to this patient.    By signing my name below, I, Abhijit Ngo, attest that this documentation has been prepared under the direction and in the presence of BROOKE Spicer   Electronically signed: Brian Slaughter, 12-06-22 @ 21:35    I, BROOKE Spicer, personally performed the services described in this documentation. all medical record entries made by the scribe were at my direction and in my presence. I have reviewed the chart and agree that the record reflects my personal performance and is accurate and complete  Electronically signed: BROOKE Spicer  Patient seen and examined at the bedside.    Remained critically ill on continuous ICU monitoring.    Today  - Cardiac cath done, LIMA graft is patent, but other grafts occluded. IABP placed  - Levophed drip and VA ECMO and IABP for cardiogenic shock  - Precedex drip for ventilator synchrony  - Argatroban drip for VA ECMO and IABP  - CVVH, removing -50cc/hr  - Increasing tube feeds to goal of 60cc/hr    ROS: Unable to obtain as patient is intubated and sedated    OBJECTIVE:  Vital Signs Last 24 Hrs  T(C): 36.4 (06 Dec 2022 20:00), Max: 36.9 (06 Dec 2022 16:00)  T(F): 97.5 (06 Dec 2022 20:00), Max: 98.4 (06 Dec 2022 16:00)  HR: 87 (06 Dec 2022 21:30) (84 - 184)  BP: --  BP(mean): --  RR: 14 (06 Dec 2022 21:30) (3 - 26)  SpO2: 100% (06 Dec 2022 21:30) (100% - 100%)    Parameters below as of 06 Dec 2022 20:00  Patient On (Oxygen Delivery Method): ventilator    O2 Concentration (%): 40      Physical Exam:   General: Intubated, multiple lines gtt  Neurology: Sedated, off sedation follows simple commands, COBB x4  Eyes: bilateral pupils equal and reactive   ENT/Neck: +ETT midline, Neck supple, trachea midline, No JVD   Respiratory: rhonchi bilaterally   CV: S1S2, no murmurs        [x] Sinus rhythm  Abdominal: Softly distended,+BS   Extremities: 1+ pedal edema noted, + peripheral pulses palpable, warm  Skin: No Rashes, Hematoma, Ecchymosis                      Assessment:  63 y/o male with PMH of CABG, DM, HTN, HLD presenting as transfer from [place] for c/f STEMI. PTA arrival received 325 aspirin, 600 plavix, and no heparin drip started. Around 1:30 am patient had multiple episodes of non-bloody diarrhea and emesis with associated abdominal pain and chest pain, unable to localize, non-radiating, with associated SOB and no associated palpitations or diaphoresis. No recent fevers/chills, cough, rashes, or changes in urination. Does report mild diffuse back pain; started 5 days ago in setting on injury while walking and lifting objects. Denies focal weakness, numbness/tingling, or LOC due does report mild dizziness.    acute rspiratory distress , cardiogenic shock s/p VA ECMO 11/25  Hemodynamic instability  Hypovolemia  Acute blood loss anemia  acute pancreatitis        Plan:   ***Neuro***  CT head showed 4mm subdural hematoma 11/26: repeat CT head unchanged 12/01  Plan for additional Repeat CT for Pancreatitis assessment   [x] Sedated with [x] Precedex   Propofol weaned off   Post operative neuro assessment   PRN Dilaudid for pain      ***Cardiovascular***  12/3 TTE: EF 30-35%, moderate-severe LV dysfunction  Invasive hemodynamic monitoring, assess perfusion indices   SR / CVP 3/ MAP 73/ Hct 25.6/ Lactate 1.1  [x] Levophed 0.05 mcg/kg/min, MAP goal 65-75  [x] L Femoral IABP put in today with good augmentation @ 1:1 [x] ECMO- 3600 rpm, 4.07 flow--> Potential wean in future  Continuos reassessment of hemodynamics   [x] Statin  [x] AC Therapy with Argatroban gtt for ECMO circuit (PTT 40-45)  cardiac cath today showed patent LIMA graft and other grafts occluded      ***Pulmonary***  Post op vent management   Titration of FiO2 and PEEP, follow SpO2, CXR, blood gasses   100% FiO2 on ECMO  Nitric oxide weaned off 12/3    Mode: AC/ CMV (Assist Control/ Continuous Mandatory Ventilation)  RR (machine): 14  FiO2: 40  PEEP: 12  ITime: 1  MAP: 19  PC: 10  PIP: 23              ***GI***  [x] NPO with TF's Vital AF, increasing to goal 60cc/hr  [x] Protonix    Bowel regimen with Miralax and senna       ***Renal***  ERIC, renal following  Restarted CVVHD yesterday   Even fluid goal  Continue to monitor I/Os, BUN/Creatinine.   Replete lytes PRN  De Dios present      ***ID***  No active antibiotic coverage      ***Endocrine***  [x]  DM2: HbA1c 7.3%                - [x] Insulin gtt              - Need tight glycemic control to prevent wound infection.    ***GOC***  Next family meeting 12/13 at 1:30PM        Patient requires continuous monitoring with bedside rhythm monitoring, pulse oximetry monitoring, and continuous central venous and arterial pressure monitoring; and intermittent blood gas analysis. Care plan discussed with the ICU care team.   Patient remained critical, at risk for life threatening decompensation.    I have spent 40 minutes providing critical care management to this patient.    By signing my name below, I, Abhijit Ngo, attest that this documentation has been prepared under the direction and in the presence of BROOKE Spicer   Electronically signed: Brian Slaughter, 12-06-22 @ 21:35    I, BROOKE Spicer, personally performed the services described in this documentation. all medical record entries made by the scribe were at my direction and in my presence. I have reviewed the chart and agree that the record reflects my personal performance and is accurate and complete  Electronically signed: BROOKE Spicer  Patient seen and examined at the bedside.    Remained critically ill on continuous ICU monitoring.    Today  - Cardiac cath done, LIMA graft is patent, but other grafts occluded. IABP placed  - Levophed drip and VA ECMO and IABP for cardiogenic shock  - Precedex drip for ventilator synchrony  - Argatroban drip for VA ECMO and IABP  - CVVH, removing -50cc/hr  - Increasing tube feeds to goal of 60cc/hr    ROS: Unable to obtain as patient is intubated and sedated    OBJECTIVE:  Vital Signs Last 24 Hrs  T(C): 36.4 (06 Dec 2022 20:00), Max: 36.9 (06 Dec 2022 16:00)  T(F): 97.5 (06 Dec 2022 20:00), Max: 98.4 (06 Dec 2022 16:00)  HR: 87 (06 Dec 2022 21:30) (84 - 184)  BP: --  BP(mean): --  RR: 14 (06 Dec 2022 21:30) (3 - 26)  SpO2: 100% (06 Dec 2022 21:30) (100% - 100%)    Parameters below as of 06 Dec 2022 20:00  Patient On (Oxygen Delivery Method): ventilator    O2 Concentration (%): 40      Physical Exam:   General: Intubated, multiple lines gtt  Neurology: Sedated, off sedation follows simple commands, COBB x4  Eyes: bilateral pupils equal and reactive   ENT/Neck: +ETT midline, Neck supple, trachea midline, No JVD   Respiratory: rhonchi bilaterally   CV: S1S2, no murmurs        [x] Sinus rhythm  Abdominal: Softly distended,+BS   Extremities: 1+ pedal edema noted, + peripheral pulses palpable, warm  Skin: No Rashes, Hematoma, Ecchymosis                      Assessment:  61 y/o male with PMH of CABG, DM, HTN, HLD presenting as transfer from [place] for c/f STEMI. PTA arrival received 325 aspirin, 600 plavix, and no heparin drip started. Around 1:30 am patient had multiple episodes of non-bloody diarrhea and emesis with associated abdominal pain and chest pain, unable to localize, non-radiating, with associated SOB and no associated palpitations or diaphoresis. No recent fevers/chills, cough, rashes, or changes in urination. Does report mild diffuse back pain; started 5 days ago in setting on injury while walking and lifting objects. Denies focal weakness, numbness/tingling, or LOC due does report mild dizziness.    acute rspiratory distress , cardiogenic shock s/p VA ECMO 11/25  Hemodynamic instability  Hypovolemia  Acute blood loss anemia  acute pancreatitis        Plan:   ***Neuro***  CT head showed 4mm subdural hematoma 11/26: repeat CT head unchanged 12/01  Plan for additional Repeat CT for Pancreatitis assessment   [x] Sedated with [x] Precedex   Propofol weaned off   Post operative neuro assessment   PRN Dilaudid for pain      ***Cardiovascular***  12/3 TTE: EF 30-35%, moderate-severe LV dysfunction  Invasive hemodynamic monitoring, assess perfusion indices   SR / CVP 3/ MAP 73/ Hct 25.6/ Lactate 1.1  [x] Levophed 0.05 mcg/kg/min, MAP goal 65-75  [x] L Femoral IABP put in today with good augmentation @ 1:1 [x] ECMO- 3600 rpm, 4.07 flow--> Potential wean in future  Continuos reassessment of hemodynamics   [x] Statin  [x] AC Therapy with Argatroban gtt for ECMO circuit (PTT 40-45)  cardiac cath today showed patent LIMA graft and other grafts occluded      ***Pulmonary***  Post op vent management   Titration of FiO2 and PEEP, follow SpO2, CXR, blood gasses   100% FiO2 on ECMO  Nitric oxide weaned off 12/3    Mode: AC/ CMV (Assist Control/ Continuous Mandatory Ventilation)  RR (machine): 14  FiO2: 40  PEEP: 12  ITime: 1  MAP: 19  PC: 10  PIP: 23              ***GI***  [x] NPO with TF's Vital AF, increasing to goal 60cc/hr  [x] Protonix    Bowel regimen with Miralax and senna       ***Renal***  ERIC, renal following  Restarted CVVHD yesterday   Even fluid goal  Continue to monitor I/Os, BUN/Creatinine.   Replete lytes PRN  De Dios present      ***ID***  No active antibiotic coverage      ***Endocrine***  [x]  DM2: HbA1c 7.3%                - [x] Insulin gtt              - Need tight glycemic control to prevent wound infection.    ***GOC***  Next family meeting 12/13 at 1:30PM        Patient requires continuous monitoring with bedside rhythm monitoring, pulse oximetry monitoring, and continuous central venous and arterial pressure monitoring; and intermittent blood gas analysis. Care plan discussed with the ICU care team.   Patient remained critical, at risk for life threatening decompensation.    I have spent 40 minutes providing critical care management to this patient.    By signing my name below, I, Abhijit Ngo, attest that this documentation has been prepared under the direction and in the presence of BROOKE Spicer   Electronically signed: Brian Slaughter, 12-06-22 @ 21:35    I, BROOKE Spicer, personally performed the services described in this documentation. all medical record entries made by the scribe were at my direction and in my presence. I have reviewed the chart and agree that the record reflects my personal performance and is accurate and complete  Electronically signed: BROOKE Spicer

## 2022-12-06 NOTE — PROGRESS NOTE ADULT - ASSESSMENT
63 YO M with a history of CAD s/p 4v CABG ~2019 in [facility], DM2 (A1c 7.3%), and HTN who initially presented to OSH with abdominal pain and n/v and found to have pancreatitis with lipase > 3000 though also with elevated troponins. CT abdomen revealed acute pancreatitis and his initial LVEF was 40-45%. He developed MSOF with hypoxic respiratory failure requiring intubation and ERIC and went into hypoxic PEA arrest requiring ACLS and due to persistent hypoxia and hypotension on pressors after ROSC was cannulated to VA ECMO (LFV, RFA, no anterograde perfusion catheter) on 11/25. Notably CTH that day revealed small subdural hemorrhage.     His post arrest TTE on 11/26 showed a signficantly worse LVEF of 5-10% with an AV that was only minimally opening. Since then he has had improvement in his LV function (now ~35-40%) and improved pulsatility while tolerating progressive slow ECMO weans. ECMO turndown (note in chart 11/30) was reassuring for ability to decannulate to IABP/inotropes. He is awaiting LHC (IABP at same time) with plan to do if renal fn improved but at this point likely will require dialysis so plan to do tomorrow. Afterwards will tentatively plan for ECMO decannulation. His ARDS is improving.     *** INCOMPLETE NOTE *** INCOMPLETE NOTE *** INCOMPLETE NOTE *** INCOMPLETE NOTE *** INCOMPLETE NOTE *** INCOMPLETE NOTE *** INCOMPLETE NOTE *** INCOMPLETE NOTE *** INCOMPLETE NOTE *** INCOMPLETE NOTE *** INCOMPLETE NOTE *** INCOMPLETE NOTE *** INCOMPLETE NOTE *** INCOMPLETE NOTE *** INCOMPLETE NOTE *** INCOMPLETE NOTE *** INCOMPLETE NOTE *** INCOMPLETE NOTE *** INCOMPLETE NOTE *** INCOMPLETE NOTE ***  RECS BELOW ARE NOT TO BE FOLLOWED UNTIL FINALIZED  argat gtt, atorva 40, CRRT, dexmed gtt, insulin gtt, norepi gtt, prop gtt    Problem/Plan - 1:  ·  Problem: Non-ST elevation MI (NSTEMI).   ·  Plan:   - troponin peaked at > 63627   - plan for LHC with IABP;, likely tomorrow as above  - continue statin  - would start ASA when okay with surgical team.    Problem/Plan - 2:  ·  Problem: Gall stone pancreatitis.   ·  Plan:   - CT abd showing acute pancreatitis  - RUQ showing sludge, no stones  - lipase > 3000 11/24, normalized to 40 and now uptrended to 900 with resuming NGT feeding   - conservative care  - appreciate GI recs, no intervention planned   - surgery following   - on empiric meropenem  - had a low grade fever despite abx, f/u infectious workup.    Problem/Plan - 3:  ·  Problem: ERIC (acute kidney injury).   ·  Plan:   - likely arrest associated ATN and now worsened from hypovolemia   - stop diuretics and aim to keep net even to positive   - renal following, given hyperNa and volume status plan for CVVH today.    Problem/Plan - 4:  ·  Problem: Cardiogenic shock.   ·  Plan:   - continue VA ECMO (LFV/RFA, no anterograde perfusion). monitor distal pulses closely  - ECMO wean performed on 11/30 and favorable, MDR discussion to continue ECMO until to be able to more safely perform LHC given ERIC, tentative plan for LHC w/ IABP placement tomorrow (12/06) after CVVH today  - remains pulsatile  - cont argatroban for AC while on ECMO, norepi gtt titrated as needed given sedation/shock.    Problem/Plan - 5:  ·  Problem: Nontraumatic subdural hematoma.   ·  Plan:   - small 3mm subdural hemorrhage stable on repeat CT  - okay to continue argatroban, appreciate neuro recs  - keep MAP < 75 mmHg and careful monitoring of PTT.    Problem/Plan - 6:  ·  Problem: Acute respiratory failure with hypoxia.   ·  Plan:   - CXR improved with more lung volumes after bronch and increased PEEP  - bronch showed erythematous airways with scant bleeding  - plan to keep net even today  - extubate when able  - continue checking right radial ABG.  - continue treatment for pancreatitis related ARDS  - caution with propofol gtt given pancreatis, would follow lipids.    Worsening renal function. Able to move all extremities. On exam, NAD, JVP approx 6 cm w/ HJR, RRR, CTA anterior lung fields, abdomen distended with minimal bowel sounds, no edema, pulses intact. Labs reviewed - Na 151 (stably elevated), BUN/Cr 101/4.12 (from 93/3.8; b/l on admission 1.6), albumin 3.0.   - plan for HD today  - LHC/IABP tomorrow to assist with ECMO wean  - c/w anticoagulation  - prognosis guarded .    61 YO M with a history of CAD s/p 4v CABG ~2019 in [facility], DM2 (A1c 7.3%), and HTN who initially presented to OSH with abdominal pain and n/v and found to have pancreatitis with lipase > 3000 though also with elevated troponins. CT abdomen revealed acute pancreatitis and his initial LVEF was 40-45%. He developed MSOF with hypoxic respiratory failure requiring intubation and ERIC and went into hypoxic PEA arrest requiring ACLS and due to persistent hypoxia and hypotension on pressors after ROSC was cannulated to VA ECMO (LFV, RFA, no anterograde perfusion catheter) on 11/25. Notably CTH that day revealed small subdural hemorrhage.     His post arrest TTE on 11/26 showed a signficantly worse LVEF of 5-10% with an AV that was only minimally opening. Since then he has had improvement in his LV function (now ~35-40%) and improved pulsatility while tolerating progressive slow ECMO weans. ECMO turndown (note in chart 11/30) was reassuring for ability to decannulate to IABP/inotropes. He is awaiting LHC (IABP at same time) with plan to do if renal fn improved but at this point likely will require dialysis so plan to do tomorrow. Afterwards will tentatively plan for ECMO decannulation. His ARDS is improving.     *** INCOMPLETE NOTE *** INCOMPLETE NOTE *** INCOMPLETE NOTE *** INCOMPLETE NOTE *** INCOMPLETE NOTE *** INCOMPLETE NOTE *** INCOMPLETE NOTE *** INCOMPLETE NOTE *** INCOMPLETE NOTE *** INCOMPLETE NOTE *** INCOMPLETE NOTE *** INCOMPLETE NOTE *** INCOMPLETE NOTE *** INCOMPLETE NOTE *** INCOMPLETE NOTE *** INCOMPLETE NOTE *** INCOMPLETE NOTE *** INCOMPLETE NOTE *** INCOMPLETE NOTE *** INCOMPLETE NOTE ***  RECS BELOW ARE NOT TO BE FOLLOWED UNTIL FINALIZED  argat gtt, atorva 40, CRRT, dexmed gtt, insulin gtt, norepi gtt, prop gtt    Problem/Plan - 1:  ·  Problem: Non-ST elevation MI (NSTEMI).   ·  Plan:   - troponin peaked at > 80203   - plan for LHC with IABP;, likely tomorrow as above  - continue statin  - would start ASA when okay with surgical team.    Problem/Plan - 2:  ·  Problem: Gall stone pancreatitis.   ·  Plan:   - CT abd showing acute pancreatitis  - RUQ showing sludge, no stones  - lipase > 3000 11/24, normalized to 40 and now uptrended to 900 with resuming NGT feeding   - conservative care  - appreciate GI recs, no intervention planned   - surgery following   - on empiric meropenem  - had a low grade fever despite abx, f/u infectious workup.    Problem/Plan - 3:  ·  Problem: ERIC (acute kidney injury).   ·  Plan:   - likely arrest associated ATN and now worsened from hypovolemia   - stop diuretics and aim to keep net even to positive   - renal following, given hyperNa and volume status plan for CVVH today.    Problem/Plan - 4:  ·  Problem: Cardiogenic shock.   ·  Plan:   - continue VA ECMO (LFV/RFA, no anterograde perfusion). monitor distal pulses closely  - ECMO wean performed on 11/30 and favorable, MDR discussion to continue ECMO until to be able to more safely perform LHC given ERIC, tentative plan for LHC w/ IABP placement tomorrow (12/06) after CVVH today  - remains pulsatile  - cont argatroban for AC while on ECMO, norepi gtt titrated as needed given sedation/shock.    Problem/Plan - 5:  ·  Problem: Nontraumatic subdural hematoma.   ·  Plan:   - small 3mm subdural hemorrhage stable on repeat CT  - okay to continue argatroban, appreciate neuro recs  - keep MAP < 75 mmHg and careful monitoring of PTT.    Problem/Plan - 6:  ·  Problem: Acute respiratory failure with hypoxia.   ·  Plan:   - CXR improved with more lung volumes after bronch and increased PEEP  - bronch showed erythematous airways with scant bleeding  - plan to keep net even today  - extubate when able  - continue checking right radial ABG.  - continue treatment for pancreatitis related ARDS  - caution with propofol gtt given pancreatis, would follow lipids.    Worsening renal function. Able to move all extremities. On exam, NAD, JVP approx 6 cm w/ HJR, RRR, CTA anterior lung fields, abdomen distended with minimal bowel sounds, no edema, pulses intact. Labs reviewed - Na 151 (stably elevated), BUN/Cr 101/4.12 (from 93/3.8; b/l on admission 1.6), albumin 3.0.   - plan for HD today  - LHC/IABP tomorrow to assist with ECMO wean  - c/w anticoagulation  - prognosis guarded .    61 YO M with a history of CAD s/p 4v CABG ~2019 in [facility], DM2 (A1c 7.3%), and HTN who initially presented to OSH with abdominal pain and n/v and found to have pancreatitis with lipase > 3000 though also with elevated troponins. CT abdomen revealed acute pancreatitis and his initial LVEF was 40-45%. He developed MSOF with hypoxic respiratory failure requiring intubation and ERIC and went into hypoxic PEA arrest requiring ACLS and due to persistent hypoxia and hypotension on pressors after ROSC was cannulated to VA ECMO (LFV, RFA, no anterograde perfusion catheter) on 11/25. Notably CTH that day revealed small subdural hemorrhage.     His post arrest TTE on 11/26 showed a signficantly worse LVEF of 5-10% with an AV that was only minimally opening. Since then he has had improvement in his LV function (now ~35-40%) and improved pulsatility while tolerating progressive slow ECMO weans. ECMO turndown (note in chart 11/30) was reassuring for ability to decannulate to IABP/inotropes. He is awaiting LHC (IABP at same time) with plan to do if renal fn improved but at this point likely will require dialysis so plan to do tomorrow. Afterwards will tentatively plan for ECMO decannulation. His ARDS is improving.     *** INCOMPLETE NOTE *** INCOMPLETE NOTE *** INCOMPLETE NOTE *** INCOMPLETE NOTE *** INCOMPLETE NOTE *** INCOMPLETE NOTE *** INCOMPLETE NOTE *** INCOMPLETE NOTE *** INCOMPLETE NOTE *** INCOMPLETE NOTE *** INCOMPLETE NOTE *** INCOMPLETE NOTE *** INCOMPLETE NOTE *** INCOMPLETE NOTE *** INCOMPLETE NOTE *** INCOMPLETE NOTE *** INCOMPLETE NOTE *** INCOMPLETE NOTE *** INCOMPLETE NOTE *** INCOMPLETE NOTE ***  RECS BELOW ARE NOT TO BE FOLLOWED UNTIL FINALIZED  argat gtt, atorva 40, CRRT, dexmed gtt, insulin gtt, norepi gtt, prop gtt    Problem/Plan - 1:  ·  Problem: Non-ST elevation MI (NSTEMI).   ·  Plan:   - troponin peaked at > 44627   - plan for LHC with IABP;, likely tomorrow as above  - continue statin  - would start ASA when okay with surgical team.    Problem/Plan - 2:  ·  Problem: Gall stone pancreatitis.   ·  Plan:   - CT abd showing acute pancreatitis  - RUQ showing sludge, no stones  - lipase > 3000 11/24, normalized to 40 and now uptrended to 900 with resuming NGT feeding   - conservative care  - appreciate GI recs, no intervention planned   - surgery following   - on empiric meropenem  - had a low grade fever despite abx, f/u infectious workup.    Problem/Plan - 3:  ·  Problem: ERIC (acute kidney injury).   ·  Plan:   - likely arrest associated ATN and now worsened from hypovolemia   - stop diuretics and aim to keep net even to positive   - renal following, given hyperNa and volume status plan for CVVH today.    Problem/Plan - 4:  ·  Problem: Cardiogenic shock.   ·  Plan:   - continue VA ECMO (LFV/RFA, no anterograde perfusion). monitor distal pulses closely  - ECMO wean performed on 11/30 and favorable, MDR discussion to continue ECMO until to be able to more safely perform LHC given ERIC, tentative plan for LHC w/ IABP placement tomorrow (12/06) after CVVH today  - remains pulsatile  - cont argatroban for AC while on ECMO, norepi gtt titrated as needed given sedation/shock.    Problem/Plan - 5:  ·  Problem: Nontraumatic subdural hematoma.   ·  Plan:   - small 3mm subdural hemorrhage stable on repeat CT  - okay to continue argatroban, appreciate neuro recs  - keep MAP < 75 mmHg and careful monitoring of PTT.    Problem/Plan - 6:  ·  Problem: Acute respiratory failure with hypoxia.   ·  Plan:   - CXR improved with more lung volumes after bronch and increased PEEP  - bronch showed erythematous airways with scant bleeding  - plan to keep net even today  - extubate when able  - continue checking right radial ABG.  - continue treatment for pancreatitis related ARDS  - caution with propofol gtt given pancreatis, would follow lipids.    Worsening renal function. Able to move all extremities. On exam, NAD, JVP approx 6 cm w/ HJR, RRR, CTA anterior lung fields, abdomen distended with minimal bowel sounds, no edema, pulses intact. Labs reviewed - Na 151 (stably elevated), BUN/Cr 101/4.12 (from 93/3.8; b/l on admission 1.6), albumin 3.0.   - plan for HD today  - LHC/IABP tomorrow to assist with ECMO wean  - c/w anticoagulation  - prognosis guarded .    63 YO M with a history of CAD s/p 4v CABG ~2019 in [facility], DM2 (A1c 7.3%), and HTN who initially presented to OSH with abdominal pain and n/v and found to have pancreatitis with lipase > 3000 though also with elevated troponins. CT abdomen revealed acute pancreatitis and his initial LVEF was 40-45%. He developed MSOF with hypoxic respiratory failure requiring intubation and ERIC and went into hypoxic PEA arrest requiring ACLS and due to persistent hypoxia and hypotension on pressors after ROSC was cannulated to VA ECMO (LFV, RFA, no anterograde perfusion catheter) on 11/25. Notably CTH that day revealed small subdural hemorrhage.     His post arrest TTE on 11/26 showed a signficantly worse LVEF of 5-10% with an AV that was only minimally opening. Since then he has had improvement in his LV function (now ~35-40%) and improved pulsatility while tolerating progressive slow ECMO weans. ECMO turndown (note in chart 11/30) was reassuring for ability to decannulate to IABP/inotropes. [facility] 12/06 showed closure of his 3 vein grafts with graft to LAD patent though with distal native disease. IABP placed, planning for ECMO weaning. His ARDS is improving. Currently on CVVH. 61 YO M with a history of CAD s/p 4v CABG ~2019 in [facility], DM2 (A1c 7.3%), and HTN who initially presented to OSH with abdominal pain and n/v and found to have pancreatitis with lipase > 3000 though also with elevated troponins. CT abdomen revealed acute pancreatitis and his initial LVEF was 40-45%. He developed MSOF with hypoxic respiratory failure requiring intubation and ERIC and went into hypoxic PEA arrest requiring ACLS and due to persistent hypoxia and hypotension on pressors after ROSC was cannulated to VA ECMO (LFV, RFA, no anterograde perfusion catheter) on 11/25. Notably CTH that day revealed small subdural hemorrhage.     His post arrest TTE on 11/26 showed a signficantly worse LVEF of 5-10% with an AV that was only minimally opening. Since then he has had improvement in his LV function (now ~35-40%) and improved pulsatility while tolerating progressive slow ECMO weans. ECMO turndown (note in chart 11/30) was reassuring for ability to decannulate to IABP/inotropes. [facility] 12/06 showed closure of his 3 vein grafts with graft to LAD patent though with distal native disease. IABP placed, planning for ECMO weaning. His ARDS is improving. Currently on CVVH. 63 YO M with a history of CAD s/p 4v CABG ~2019 in [facility], DM2 (A1c 7.3%), and HTN who initially presented to OSH with abdominal pain and n/v and found to have pancreatitis with lipase > 3000 though also with elevated troponins. CT abdomen revealed acute pancreatitis and his initial LVEF was 40-45%. He developed MSOF with hypoxic respiratory failure requiring intubation and ERIC and went into hypoxic PEA arrest requiring ACLS and due to persistent hypoxia and hypotension on pressors after ROSC was cannulated to VA ECMO (LFV, RFA, no anterograde perfusion catheter) on 11/25. Notably CTH that day revealed small subdural hemorrhage.     His post arrest TTE on 11/26 showed a signficantly worse LVEF of 5-10% with an AV that was only minimally opening. Since then he has had improvement in his LV function (now ~35-40%) and improved pulsatility while tolerating progressive slow ECMO weans. ECMO turndown (note in chart 11/30) was reassuring for ability to decannulate to IABP/inotropes. [facility] 12/06 showed closure of his 3 vein grafts with graft to LAD patent though with distal native disease. IABP placed, planning for ECMO weaning. His ARDS is improving. Currently on CVVH.

## 2022-12-06 NOTE — PROGRESS NOTE ADULT - SUBJECTIVE AND OBJECTIVE BOX
infectious diseases progress note:    Patient is a 62y old  Male who presents with a chief complaint of Acute cholecystitis, gallstone pancreatitis (06 Dec 2022 07:35)        Acute pancreatitis without infection or necrosis               Allergies    No Known Allergies    Intolerances        ANTIBIOTICS/RELEVANT:  antimicrobials    immunologic:    OTHER:  argatroban Infusion 0.075 MICROgram(s)/kG/Min IV Continuous <Continuous>  artificial  tears Solution 1 Drop(s) Both EYES four times a day  atorvastatin 40 milliGRAM(s) Oral at bedtime  chlorhexidine 0.12% Liquid 15 milliLiter(s) Oral Mucosa every 12 hours  chlorhexidine 2% Cloths 1 Application(s) Topical <User Schedule>  CRRT Treatment    <Continuous>  dexMEDEtomidine Infusion 1.5 MICROgram(s)/kG/Hr IV Continuous <Continuous>  dextrose 50% Injectable 50 milliLiter(s) IV Push every 15 minutes  HYDROmorphone  Injectable 0.5 milliGRAM(s) IV Push every 6 hours  insulin regular Infusion 6 Unit(s)/Hr IV Continuous <Continuous>  norepinephrine Infusion 0.05 MICROgram(s)/kG/Min IV Continuous <Continuous>  pantoprazole  Injectable 40 milliGRAM(s) IV Push two times a day  polyethylene glycol 3350 17 Gram(s) Oral two times a day  PrismaSATE Dialysate BK 0 / 3.5 5000 milliLiter(s) CRRT <Continuous>  PrismaSOL Filtration BGK 4 / 2.5 5000 milliLiter(s) CRRT <Continuous>  PrismaSOL Filtration BGK 4 / 2.5 5000 milliLiter(s) CRRT <Continuous>  propofol Infusion 17.94 MICROgram(s)/kG/Min IV Continuous <Continuous>  senna 2 Tablet(s) Oral at bedtime  sodium chloride 0.9% lock flush 10 milliLiter(s) IV Push every 1 hour PRN  sodium chloride 0.9%. 1000 milliLiter(s) IV Continuous <Continuous>      Objective:  Vital Signs Last 24 Hrs  T(C): 36.5 (06 Dec 2022 08:00), Max: 36.7 (06 Dec 2022 00:00)  T(F): 97.7 (06 Dec 2022 08:00), Max: 98.1 (06 Dec 2022 00:00)  HR: 90 (06 Dec 2022 08:30) (81 - 114)  BP: --  BP(mean): --  RR: 13 (06 Dec 2022 08:30) (3 - 26)  SpO2: 100% (06 Dec 2022 08:30) (100% - 100%)    Parameters below as of 06 Dec 2022 08:00  Patient On (Oxygen Delivery Method): ventilator           Eyes:INOCENCIO, EOMI  Ear/Nose/Throat: no oral lesion, no sinus tenderness on percussion	  Neck:no JVD, no lymphadenopathy, supple  Respiratory: CTA lore  Cardiovascular: S1S2 RRR, no murmurs  Gastrointestinal:soft, (+) BS, no HSM  Extremities:no e/e/c        LABS:                        8.3    16.49 )-----------( 162      ( 06 Dec 2022 00:40 )             25.3     12-06    143  |  105  |  61<H>  ----------------------------<  141<H>  4.0   |  23  |  2.51<H>    Ca    8.9      06 Dec 2022 00:40  Phos  4.6     12-06  Mg     2.3     12-06    TPro  6.2  /  Alb  3.1<L>  /  TBili  1.4<H>  /  DBili  x   /  AST  67<H>  /  ALT  9<L>  /  AlkPhos  261<H>  12-06    PT/INR - ( 05 Dec 2022 13:57 )   PT: 17.7 sec;   INR: 1.52 ratio         PTT - ( 06 Dec 2022 05:16 )  PTT:45.1 sec        MICROBIOLOGY:    RECENT CULTURES:  12-03 @ 17:05 .Blood Blood                No growth to date.    12-03 @ 15:29 ET Tube ET Tube       Rare polymorphonuclear leukocytes per low power field  No Squamous epithelial cells per low power field  No organisms seen per oil power field           Normal Respiratory Christy present    12-03 @ 14:18 .Blood Blood                No growth to date.    12-01 @ 17:46 .Bronchial Bronchial Lavage       Rare polymorphonuclear leukocytes per low power field  No Squamous epithelial cells per low power field  No organisms seen per oil power field           No growth at 48 hours    12-01 @ 13:20 .Blood Blood-Peripheral                No growth to date.    11-30 @ 13:10 .Bronchial Bronchial Lavage       Rare polymorphonuclear leukocytes per low power field  No squamous epithelial cells per low power field  No organisms seen           No growth          RESPIRATORY CULTURES:              RADIOLOGY & ADDITIONAL STUDIES:        Pager [phone]  After 5 pm/weekends or if no response :[phone] infectious diseases progress note:    Patient is a 62y old  Male who presents with a chief complaint of Acute cholecystitis, gallstone pancreatitis (06 Dec 2022 07:35)        Acute pancreatitis without infection or necrosis               Allergies    No Known Allergies    Intolerances        ANTIBIOTICS/RELEVANT:  antimicrobials    immunologic:    OTHER:  argatroban Infusion 0.075 MICROgram(s)/kG/Min IV Continuous <Continuous>  artificial  tears Solution 1 Drop(s) Both EYES four times a day  atorvastatin 40 milliGRAM(s) Oral at bedtime  chlorhexidine 0.12% Liquid 15 milliLiter(s) Oral Mucosa every 12 hours  chlorhexidine 2% Cloths 1 Application(s) Topical <User Schedule>  CRRT Treatment    <Continuous>  dexMEDEtomidine Infusion 1.5 MICROgram(s)/kG/Hr IV Continuous <Continuous>  dextrose 50% Injectable 50 milliLiter(s) IV Push every 15 minutes  HYDROmorphone  Injectable 0.5 milliGRAM(s) IV Push every 6 hours  insulin regular Infusion 6 Unit(s)/Hr IV Continuous <Continuous>  norepinephrine Infusion 0.05 MICROgram(s)/kG/Min IV Continuous <Continuous>  pantoprazole  Injectable 40 milliGRAM(s) IV Push two times a day  polyethylene glycol 3350 17 Gram(s) Oral two times a day  PrismaSATE Dialysate BK 0 / 3.5 5000 milliLiter(s) CRRT <Continuous>  PrismaSOL Filtration BGK 4 / 2.5 5000 milliLiter(s) CRRT <Continuous>  PrismaSOL Filtration BGK 4 / 2.5 5000 milliLiter(s) CRRT <Continuous>  propofol Infusion 17.94 MICROgram(s)/kG/Min IV Continuous <Continuous>  senna 2 Tablet(s) Oral at bedtime  sodium chloride 0.9% lock flush 10 milliLiter(s) IV Push every 1 hour PRN  sodium chloride 0.9%. 1000 milliLiter(s) IV Continuous <Continuous>      Objective:  Vital Signs Last 24 Hrs  T(C): 36.5 (06 Dec 2022 08:00), Max: 36.7 (06 Dec 2022 00:00)  T(F): 97.7 (06 Dec 2022 08:00), Max: 98.1 (06 Dec 2022 00:00)  HR: 90 (06 Dec 2022 08:30) (81 - 114)  BP: --  BP(mean): --  RR: 13 (06 Dec 2022 08:30) (3 - 26)  SpO2: 100% (06 Dec 2022 08:30) (100% - 100%)    Parameters below as of 06 Dec 2022 08:00  Patient On (Oxygen Delivery Method): ventilator           Eyes:INOCENCIO, EOMI  Ear/Nose/Throat: no oral lesion, no sinus tenderness on percussion	  Neck:no JVD, no lymphadenopathy, supple  Respiratory: CTA lore  Cardiovascular: S1S2 RRR, no murmurs  Gastrointestinal:soft, (+) BS, no HSM  Extremities:no e/e/c        LABS:                        8.3    16.49 )-----------( 162      ( 06 Dec 2022 00:40 )             25.3     12-06    143  |  105  |  61<H>  ----------------------------<  141<H>  4.0   |  23  |  2.51<H>    Ca    8.9      06 Dec 2022 00:40  Phos  4.6     12-06  Mg     2.3     12-06    TPro  6.2  /  Alb  3.1<L>  /  TBili  1.4<H>  /  DBili  x   /  AST  67<H>  /  ALT  9<L>  /  AlkPhos  261<H>  12-06    PT/INR - ( 05 Dec 2022 13:57 )   PT: 17.7 sec;   INR: 1.52 ratio         PTT - ( 06 Dec 2022 05:16 )  PTT:45.1 sec        MICROBIOLOGY:    RECENT CULTURES:  12-03 @ 17:05 .Blood Blood                No growth to date.    12-03 @ 15:29 ET Tube ET Tube       Rare polymorphonuclear leukocytes per low power field  No Squamous epithelial cells per low power field  No organisms seen per oil power field           Normal Respiratory Christy present    12-03 @ 14:18 .Blood Blood                No growth to date.    12-01 @ 17:46 .Bronchial Bronchial Lavage       Rare polymorphonuclear leukocytes per low power field  No Squamous epithelial cells per low power field  No organisms seen per oil power field           No growth at 48 hours    12-01 @ 13:20 .Blood Blood-Peripheral                No growth to date.    11-30 @ 13:10 .Bronchial Bronchial Lavage       Rare polymorphonuclear leukocytes per low power field  No squamous epithelial cells per low power field  No organisms seen           No growth          RESPIRATORY CULTURES:              RADIOLOGY & ADDITIONAL STUDIES:        Pager [phone]  After 5 pm/weekends or if no response :[phone] infectious diseases progress note:    Patient is a 62y old  Male who presents with a chief complaint of Acute cholecystitis, gallstone pancreatitis (06 Dec 2022 07:35)        Acute pancreatitis without infection or necrosis               Allergies    No Known Allergies    Intolerances        ANTIBIOTICS/RELEVANT:  antimicrobials    immunologic:    OTHER:  argatroban Infusion 0.075 MICROgram(s)/kG/Min IV Continuous <Continuous>  artificial  tears Solution 1 Drop(s) Both EYES four times a day  atorvastatin 40 milliGRAM(s) Oral at bedtime  chlorhexidine 0.12% Liquid 15 milliLiter(s) Oral Mucosa every 12 hours  chlorhexidine 2% Cloths 1 Application(s) Topical <User Schedule>  CRRT Treatment    <Continuous>  dexMEDEtomidine Infusion 1.5 MICROgram(s)/kG/Hr IV Continuous <Continuous>  dextrose 50% Injectable 50 milliLiter(s) IV Push every 15 minutes  HYDROmorphone  Injectable 0.5 milliGRAM(s) IV Push every 6 hours  insulin regular Infusion 6 Unit(s)/Hr IV Continuous <Continuous>  norepinephrine Infusion 0.05 MICROgram(s)/kG/Min IV Continuous <Continuous>  pantoprazole  Injectable 40 milliGRAM(s) IV Push two times a day  polyethylene glycol 3350 17 Gram(s) Oral two times a day  PrismaSATE Dialysate BK 0 / 3.5 5000 milliLiter(s) CRRT <Continuous>  PrismaSOL Filtration BGK 4 / 2.5 5000 milliLiter(s) CRRT <Continuous>  PrismaSOL Filtration BGK 4 / 2.5 5000 milliLiter(s) CRRT <Continuous>  propofol Infusion 17.94 MICROgram(s)/kG/Min IV Continuous <Continuous>  senna 2 Tablet(s) Oral at bedtime  sodium chloride 0.9% lock flush 10 milliLiter(s) IV Push every 1 hour PRN  sodium chloride 0.9%. 1000 milliLiter(s) IV Continuous <Continuous>      Objective:  Vital Signs Last 24 Hrs  T(C): 36.5 (06 Dec 2022 08:00), Max: 36.7 (06 Dec 2022 00:00)  T(F): 97.7 (06 Dec 2022 08:00), Max: 98.1 (06 Dec 2022 00:00)  HR: 90 (06 Dec 2022 08:30) (81 - 114)  BP: --  BP(mean): --  RR: 13 (06 Dec 2022 08:30) (3 - 26)  SpO2: 100% (06 Dec 2022 08:30) (100% - 100%)    Parameters below as of 06 Dec 2022 08:00  Patient On (Oxygen Delivery Method): ventilator           Eyes:INOCENCIO, EOMI  Ear/Nose/Throat: no oral lesion, no sinus tenderness on percussion	  Neck:no JVD, no lymphadenopathy, supple  Respiratory: CTA lore  Cardiovascular: S1S2 RRR, no murmurs  Gastrointestinal:soft, (+) BS, no HSM  Extremities:no e/e/c        LABS:                        8.3    16.49 )-----------( 162      ( 06 Dec 2022 00:40 )             25.3     12-06    143  |  105  |  61<H>  ----------------------------<  141<H>  4.0   |  23  |  2.51<H>    Ca    8.9      06 Dec 2022 00:40  Phos  4.6     12-06  Mg     2.3     12-06    TPro  6.2  /  Alb  3.1<L>  /  TBili  1.4<H>  /  DBili  x   /  AST  67<H>  /  ALT  9<L>  /  AlkPhos  261<H>  12-06    PT/INR - ( 05 Dec 2022 13:57 )   PT: 17.7 sec;   INR: 1.52 ratio         PTT - ( 06 Dec 2022 05:16 )  PTT:45.1 sec        MICROBIOLOGY:    RECENT CULTURES:  12-03 @ 17:05 .Blood Blood                No growth to date.    12-03 @ 15:29 ET Tube ET Tube       Rare polymorphonuclear leukocytes per low power field  No Squamous epithelial cells per low power field  No organisms seen per oil power field           Normal Respiratory Christy present    12-03 @ 14:18 .Blood Blood                No growth to date.    12-01 @ 17:46 .Bronchial Bronchial Lavage       Rare polymorphonuclear leukocytes per low power field  No Squamous epithelial cells per low power field  No organisms seen per oil power field           No growth at 48 hours    12-01 @ 13:20 .Blood Blood-Peripheral                No growth to date.    11-30 @ 13:10 .Bronchial Bronchial Lavage       Rare polymorphonuclear leukocytes per low power field  No squamous epithelial cells per low power field  No organisms seen           No growth          RESPIRATORY CULTURES:              RADIOLOGY & ADDITIONAL STUDIES:        Pager [phone]  After 5 pm/weekends or if no response :[phone]

## 2022-12-06 NOTE — PROGRESS NOTE ADULT - ATTENDING COMMENTS
ERIC on CKD3B likely  ATN  CRRT for clearances  Keep negative for now    I spoke to his daughter Deng [phone] regarding her fathers condition. She is concerned about his possibility of renal recovery. For now I recommend he get his cardiac status evaluated with angio understanding the need for contrast for visualization and revasc if needed  Of course there is some risk of delayed recovery but the risk is outweighed by the need for cardiac eval and lifesaving intervention.  She agrees to proceed with cardiac cath  I Discussed with Dr Severino and CTS team    Melsisa Mercer MD  Off: [phone]  contact me on teams    (After 5 pm or on weekends please page the on-call fellow/attending, can check [email or web address] for schedule. Login is carmen cantor, schedule under [facility] medicine, psych, derm) ERIC on CKD3B likely  ATN  CRRT for clearances  Keep negative for now    I spoke to his daughter Deng [phone] regarding her fathers condition. She is concerned about his possibility of renal recovery. For now I recommend he get his cardiac status evaluated with angio understanding the need for contrast for visualization and revasc if needed  Of course there is some risk of delayed recovery but the risk is outweighed by the need for cardiac eval and lifesaving intervention.  She agrees to proceed with cardiac cath  I Discussed with Dr Severino and CTS team    Melissa Mercer MD  Off: [phone]  contact me on teams    (After 5 pm or on weekends please page the on-call fellow/attending, can check [email or web address] for schedule. Login is carmen cantor, schedule under [facility] medicine, psych, derm) ERIC on CKD3B likely  ATN  CRRT for clearances  Keep negative for now    I spoke to his daughter Deng [phone] regarding her fathers condition. She is concerned about his possibility of renal recovery. For now I recommend he get his cardiac status evaluated with angio understanding the need for contrast for visualization and revasc if needed  Of course there is some risk of delayed recovery but the risk is outweighed by the need for cardiac eval and lifesaving intervention.  She agrees to proceed with cardiac cath  I Discussed with Dr Severino and CTS team    Melissa Mercer MD  Off: [phone]  contact me on teams    (After 5 pm or on weekends please page the on-call fellow/attending, can check [email or web address] for schedule. Login is carmen cantor, schedule under [facility] medicine, psych, derm)

## 2022-12-06 NOTE — PROGRESS NOTE ADULT - SUBJECTIVE AND OBJECTIVE BOX
[facility] DIVISION OF KIDNEY DISEASES AND HYPERTENSION   FOLLOW UP NOTE    --------------------------------------------------------------------------------  HPI:  63 y/o male with PMH of CABG, DM, HTN, HLD presenting as transfer from [place] for c/f STEMI. Course c/b gallstone pancreatitis leading to ARDS and shock & cardiac arrest now on [facility] ECMO. Had significant troponin elevation and his LVEF down to 8%. Pt was deemed to be too unstable to have an angiogram and potential PCI due to his co-morbidities & a new subdural bleed. Pt being seen for ERIC. SCr on arrival was 2.15; trended down to hector 1.6 on 11/25 and eventually increased to 3.95 11/28. Pt received IV diuretics as per CTU.    Patient seen & examined today. Pt is intubated, unable to obtain ROS. Pt initiated on CRRT on 12/5/22 to optimize volume status and electrolytes. Pt tolerating CRRT overnight with target net negative fluid balance.     PAST HISTORY  --------------------------------------------------------------------------------  No significant changes to PMH, PSH, FHx, SHx, unless otherwise noted    ALLERGIES & MEDICATIONS  --------------------------------------------------------------------------------  Allergies    No Known Allergies    Intolerances    Standing Inpatient Medications  argatroban Infusion 0.075 MICROgram(s)/kG/Min IV Continuous <Continuous>  artificial  tears Solution 1 Drop(s) Both EYES four times a day  atorvastatin 40 milliGRAM(s) Oral at bedtime  chlorhexidine 0.12% Liquid 15 milliLiter(s) Oral Mucosa every 12 hours  chlorhexidine 2% Cloths 1 Application(s) Topical <User Schedule>  CRRT Treatment    <Continuous>  dexMEDEtomidine Infusion 1.5 MICROgram(s)/kG/Hr IV Continuous <Continuous>  dextrose 50% Injectable 50 milliLiter(s) IV Push every 15 minutes  HYDROmorphone  Injectable 0.5 milliGRAM(s) IV Push every 6 hours  insulin regular Infusion 6 Unit(s)/Hr IV Continuous <Continuous>  norepinephrine Infusion 0.05 MICROgram(s)/kG/Min IV Continuous <Continuous>  pantoprazole  Injectable 40 milliGRAM(s) IV Push two times a day  polyethylene glycol 3350 17 Gram(s) Oral two times a day  PrismaSATE Dialysate BK 0 / 3.5 5000 milliLiter(s) CRRT <Continuous>  PrismaSOL Filtration BGK 4 / 2.5 5000 milliLiter(s) CRRT <Continuous>  PrismaSOL Filtration BGK 4 / 2.5 5000 milliLiter(s) CRRT <Continuous>  propofol Infusion 17.94 MICROgram(s)/kG/Min IV Continuous <Continuous>  senna 2 Tablet(s) Oral at bedtime  sodium chloride 0.9%. 1000 milliLiter(s) IV Continuous <Continuous>    PRN Inpatient Medications  sodium chloride 0.9% lock flush 10 milliLiter(s) IV Push every 1 hour PRN      REVIEW OF SYSTEMS  --------------------------------------------------------------------------------  Unable to obtain    VITALS/PHYSICAL EXAM  --------------------------------------------------------------------------------  T(C): 36.5 (12-06-22 @ 08:00), Max: 36.7 (12-06-22 @ 00:00)  HR: 102 (12-06-22 @ 10:30) (85 - 114)  BP: --  RR: 14 (12-06-22 @ 10:30) (3 - 26)  SpO2: 100% (12-06-22 @ 10:30) (100% - 100%)  Wt(kg): --    12-05-22 @ 07:01  -  12-06-22 @ 07:00  --------------------------------------------------------  IN: 1663 mL / OUT: 2821 mL / NET: -1158 mL    12-06-22 @ 07:01  -  12-06-22 @ 10:57  --------------------------------------------------------  IN: 231.8 mL / OUT: 237 mL / NET: -5.2 mL    Physical Exam:               Gen: ill appearing male intubated   	HEENT: Anicteric  	Pulm: on CMV via ETT+  	CV: S1S2+  	Abd: Soft, +BS       	Ext: LE edema B/L++  	Neuro: sedated             : De Dios cath+ with concentrated urine in the bag  	Skin: Warm and dry  	Dialysis access: ECMO circuit    LABS/STUDIES  --------------------------------------------------------------------------------              8.3    16.49 >-----------<  162      [12-06-22 @ 00:40]              25.3     143  |  105  |  61  ----------------------------<  141      [12-06-22 @ 00:40]  4.0   |  23  |  2.51        Ca     8.9     [12-06-22 @ 00:40]      Mg     2.3     [12-06-22 @ 00:40]      Phos  4.6     [12-06-22 @ 00:40]    Creatinine Trend:  SCr 2.51 [12-06 @ 00:40]  SCr 4.19 [12-05 @ 00:50]  SCr 3.79 [12-04 @ 16:41]  SCr 3.81 [12-04 @ 13:19]  SCr 3.68 [12-04 @ 00:32]    Urinalysis - [12-03-22 @ 14:45]      Color BROWN / Appearance Slightly Turbid / SG 1.015 / pH 5.5      Gluc Negative / Ketone Negative  / Bili Negative / Urobili 6 mg/dL       Blood Moderate / Protein 30 mg/dL / Leuk Est Negative / Nitrite Negative      RBC 43 / WBC 2 / Hyaline 2 / Gran  / Sq Epi  / Non Sq Epi 1 / Bacteria Negative    Urine Osmolality 426      [12-03-22 @ 10:37] [facility] DIVISION OF KIDNEY DISEASES AND HYPERTENSION   FOLLOW UP NOTE    --------------------------------------------------------------------------------  HPI:  63 y/o male with PMH of CABG, DM, HTN, HLD presenting as transfer from [place] for c/f STEMI. Course c/b gallstone pancreatitis leading to ARDS and shock & cardiac arrest now on [facility] ECMO. Had significant troponin elevation and his LVEF down to 8%. Pt was deemed to be too unstable to have an angiogram and potential PCI due to his co-morbidities & a new subdural bleed. Pt being seen for ERIC. SCr on arrival was 2.15; trended down to hector 1.6 on 11/25 and eventually increased to 3.95 11/28. Pt received IV diuretics as per CTU.    Patient seen & examined today. Pt is intubated, unable to obtain ROS. Pt initiated on CRRT on 12/5/22 to optimize volume status and electrolytes. Pt tolerating CRRT overnight with target net negative fluid balance.     PAST HISTORY  --------------------------------------------------------------------------------  No significant changes to PMH, PSH, FHx, SHx, unless otherwise noted    ALLERGIES & MEDICATIONS  --------------------------------------------------------------------------------  Allergies    No Known Allergies    Intolerances    Standing Inpatient Medications  argatroban Infusion 0.075 MICROgram(s)/kG/Min IV Continuous <Continuous>  artificial  tears Solution 1 Drop(s) Both EYES four times a day  atorvastatin 40 milliGRAM(s) Oral at bedtime  chlorhexidine 0.12% Liquid 15 milliLiter(s) Oral Mucosa every 12 hours  chlorhexidine 2% Cloths 1 Application(s) Topical <User Schedule>  CRRT Treatment    <Continuous>  dexMEDEtomidine Infusion 1.5 MICROgram(s)/kG/Hr IV Continuous <Continuous>  dextrose 50% Injectable 50 milliLiter(s) IV Push every 15 minutes  HYDROmorphone  Injectable 0.5 milliGRAM(s) IV Push every 6 hours  insulin regular Infusion 6 Unit(s)/Hr IV Continuous <Continuous>  norepinephrine Infusion 0.05 MICROgram(s)/kG/Min IV Continuous <Continuous>  pantoprazole  Injectable 40 milliGRAM(s) IV Push two times a day  polyethylene glycol 3350 17 Gram(s) Oral two times a day  PrismaSATE Dialysate BK 0 / 3.5 5000 milliLiter(s) CRRT <Continuous>  PrismaSOL Filtration BGK 4 / 2.5 5000 milliLiter(s) CRRT <Continuous>  PrismaSOL Filtration BGK 4 / 2.5 5000 milliLiter(s) CRRT <Continuous>  propofol Infusion 17.94 MICROgram(s)/kG/Min IV Continuous <Continuous>  senna 2 Tablet(s) Oral at bedtime  sodium chloride 0.9%. 1000 milliLiter(s) IV Continuous <Continuous>    PRN Inpatient Medications  sodium chloride 0.9% lock flush 10 milliLiter(s) IV Push every 1 hour PRN      REVIEW OF SYSTEMS  --------------------------------------------------------------------------------  Unable to obtain    VITALS/PHYSICAL EXAM  --------------------------------------------------------------------------------  T(C): 36.5 (12-06-22 @ 08:00), Max: 36.7 (12-06-22 @ 00:00)  HR: 102 (12-06-22 @ 10:30) (85 - 114)  BP: --  RR: 14 (12-06-22 @ 10:30) (3 - 26)  SpO2: 100% (12-06-22 @ 10:30) (100% - 100%)  Wt(kg): --    12-05-22 @ 07:01  -  12-06-22 @ 07:00  --------------------------------------------------------  IN: 1663 mL / OUT: 2821 mL / NET: -1158 mL    12-06-22 @ 07:01  -  12-06-22 @ 10:57  --------------------------------------------------------  IN: 231.8 mL / OUT: 237 mL / NET: -5.2 mL    Physical Exam:               Gen: ill appearing male intubated   	HEENT: Anicteric  	Pulm: on CMV via ETT+  	CV: S1S2+  	Abd: Soft, +BS       	Ext: LE edema B/L++  	Neuro: sedated             : De Dios cath+ with concentrated urine in the bag  	Skin: Warm and dry  	Dialysis access: ECMO circuit    LABS/STUDIES  --------------------------------------------------------------------------------              8.3    16.49 >-----------<  162      [12-06-22 @ 00:40]              25.3     143  |  105  |  61  ----------------------------<  141      [12-06-22 @ 00:40]  4.0   |  23  |  2.51        Ca     8.9     [12-06-22 @ 00:40]      Mg     2.3     [12-06-22 @ 00:40]      Phos  4.6     [12-06-22 @ 00:40]    Creatinine Trend:  SCr 2.51 [12-06 @ 00:40]  SCr 4.19 [12-05 @ 00:50]  SCr 3.79 [12-04 @ 16:41]  SCr 3.81 [12-04 @ 13:19]  SCr 3.68 [12-04 @ 00:32]    Urinalysis - [12-03-22 @ 14:45]      Color BROWN / Appearance Slightly Turbid / SG 1.015 / pH 5.5      Gluc Negative / Ketone Negative  / Bili Negative / Urobili 6 mg/dL       Blood Moderate / Protein 30 mg/dL / Leuk Est Negative / Nitrite Negative      RBC 43 / WBC 2 / Hyaline 2 / Gran  / Sq Epi  / Non Sq Epi 1 / Bacteria Negative    Urine Osmolality 426      [12-03-22 @ 10:37] [facility] DIVISION OF KIDNEY DISEASES AND HYPERTENSION   FOLLOW UP NOTE    --------------------------------------------------------------------------------  HPI:  63 y/o male with PMH of CABG, DM, HTN, HLD presenting as transfer from [place] for c/f STEMI. Course c/b gallstone pancreatitis leading to ARDS and shock & cardiac arrest now on VA ECMO. Had significant troponin elevation and his LVEF down to 8%. Pt was deemed to be too unstable to have an angiogram and potential PCI due to his co-morbidities & a new subdural bleed. Pt being seen for ERIC. SCr on arrival was 2.15; trended down to hector 1.6 on 11/25 and eventually increased to 3.95 11/28. Pt received IV diuretics as per CTU.    Patient seen & examined today. Pt is intubated, unable to obtain ROS. Pt initiated on CRRT on 12/5/22 to optimize volume status and electrolytes. Pt tolerating CRRT overnight with target net negative fluid balance.     PAST HISTORY  --------------------------------------------------------------------------------  No significant changes to PMH, PSH, FHx, SHx, unless otherwise noted    ALLERGIES & MEDICATIONS  --------------------------------------------------------------------------------  Allergies    No Known Allergies    Intolerances    Standing Inpatient Medications  argatroban Infusion 0.075 MICROgram(s)/kG/Min IV Continuous <Continuous>  artificial  tears Solution 1 Drop(s) Both EYES four times a day  atorvastatin 40 milliGRAM(s) Oral at bedtime  chlorhexidine 0.12% Liquid 15 milliLiter(s) Oral Mucosa every 12 hours  chlorhexidine 2% Cloths 1 Application(s) Topical <User Schedule>  CRRT Treatment    <Continuous>  dexMEDEtomidine Infusion 1.5 MICROgram(s)/kG/Hr IV Continuous <Continuous>  dextrose 50% Injectable 50 milliLiter(s) IV Push every 15 minutes  HYDROmorphone  Injectable 0.5 milliGRAM(s) IV Push every 6 hours  insulin regular Infusion 6 Unit(s)/Hr IV Continuous <Continuous>  norepinephrine Infusion 0.05 MICROgram(s)/kG/Min IV Continuous <Continuous>  pantoprazole  Injectable 40 milliGRAM(s) IV Push two times a day  polyethylene glycol 3350 17 Gram(s) Oral two times a day  PrismaSATE Dialysate BK 0 / 3.5 5000 milliLiter(s) CRRT <Continuous>  PrismaSOL Filtration BGK 4 / 2.5 5000 milliLiter(s) CRRT <Continuous>  PrismaSOL Filtration BGK 4 / 2.5 5000 milliLiter(s) CRRT <Continuous>  propofol Infusion 17.94 MICROgram(s)/kG/Min IV Continuous <Continuous>  senna 2 Tablet(s) Oral at bedtime  sodium chloride 0.9%. 1000 milliLiter(s) IV Continuous <Continuous>    PRN Inpatient Medications  sodium chloride 0.9% lock flush 10 milliLiter(s) IV Push every 1 hour PRN      REVIEW OF SYSTEMS  --------------------------------------------------------------------------------  Unable to obtain    VITALS/PHYSICAL EXAM  --------------------------------------------------------------------------------  T(C): 36.5 (12-06-22 @ 08:00), Max: 36.7 (12-06-22 @ 00:00)  HR: 102 (12-06-22 @ 10:30) (85 - 114)  BP: --  RR: 14 (12-06-22 @ 10:30) (3 - 26)  SpO2: 100% (12-06-22 @ 10:30) (100% - 100%)  Wt(kg): --    12-05-22 @ 07:01  -  12-06-22 @ 07:00  --------------------------------------------------------  IN: 1663 mL / OUT: 2821 mL / NET: -1158 mL    12-06-22 @ 07:01  -  12-06-22 @ 10:57  --------------------------------------------------------  IN: 231.8 mL / OUT: 237 mL / NET: -5.2 mL    Physical Exam:               Gen: ill appearing male intubated   	HEENT: Anicteric  	Pulm: on CMV via ETT+  	CV: S1S2+  	Abd: Soft, +BS       	Ext: LE edema B/L++  	Neuro: sedated             : De Dios cath+ with concentrated urine in the bag  	Skin: Warm and dry  	Dialysis access: ECMO circuit    LABS/STUDIES  --------------------------------------------------------------------------------              8.3    16.49 >-----------<  162      [12-06-22 @ 00:40]              25.3     143  |  105  |  61  ----------------------------<  141      [12-06-22 @ 00:40]  4.0   |  23  |  2.51        Ca     8.9     [12-06-22 @ 00:40]      Mg     2.3     [12-06-22 @ 00:40]      Phos  4.6     [12-06-22 @ 00:40]    Creatinine Trend:  SCr 2.51 [12-06 @ 00:40]  SCr 4.19 [12-05 @ 00:50]  SCr 3.79 [12-04 @ 16:41]  SCr 3.81 [12-04 @ 13:19]  SCr 3.68 [12-04 @ 00:32]    Urinalysis - [12-03-22 @ 14:45]      Color BROWN / Appearance Slightly Turbid / SG 1.015 / pH 5.5      Gluc Negative / Ketone Negative  / Bili Negative / Urobili 6 mg/dL       Blood Moderate / Protein 30 mg/dL / Leuk Est Negative / Nitrite Negative      RBC 43 / WBC 2 / Hyaline 2 / Gran  / Sq Epi  / Non Sq Epi 1 / Bacteria Negative    Urine Osmolality 426      [12-03-22 @ 10:37]

## 2022-12-06 NOTE — PROGRESS NOTE ADULT - PROBLEM SELECTOR PLAN 4
- continue VA ECMO (LFV/RFA, no anterograde perfusion). monitor distal pulses closely  - ECMO wean performed on 11/30 and favorable, now that IABP in place would continue ECMO wean   - remains pulsatile  - cont argatroban for AC while on ECMO, norepi gtt titrated as needed given sedation/shock.

## 2022-12-06 NOTE — PROGRESS NOTE ADULT - ASSESSMENT
62 year old admitted with pancreatitis  not necrotizing on the CT  scan and cholecystitis .  pt has sent to the SICU but developed respiratory  failure presumed to be due to ARDS also with hs of CAD<CABG<DM and possible MI  Movedto the CTU for echo      Fever  not on CAVHD  cultures are negative and ct looks like ARDS.  no fever    legs are viable    ARDS  likely all problems related to pancreatitis .   off antibiotics    reculture  for any signs of infection   hopefully echo will be removed soon         CHF  on ecmo  weaning per cardiac team

## 2022-12-06 NOTE — PROGRESS NOTE ADULT - ASSESSMENT
Cath today with only LIMA-LAD patent, IABP placed   CT scan with ongoing carlee-pancreatic inflammation   ----------------------------------------------------------------------------------------------------  62yM w/ h/o CABG 3 yrs ago  [facility]  ECMO  for:  eCPR, Refractory cardiogenic shock   Date of initiation: 11/25/2022       Cannulae:  19Fr R Fem Arterial (no distal perfusion),  25Fr L Fem Venous    Cannulation sites dressings intact, small amount of dried blood     Continue current support, likely weaning trials with echo in coming days now that IABP in place   Cont CVVH  Anticoagulation w/argatroban, PTT 40-50, has known Posterior falx SDH, last CT scan 11/26, rpt head CT today   Monitor extremities, stable pulse exam currently, R thigh slightly larger than L   Family mtg today, was reinforced that ECMO is bridge to recovery only at this time, will work to optimize for decannulation     =============================================================  Weight (kg): 92.9 (11-24-22)        Height (cm): 172.7 (11-24-22)   BSA (m2): 2.06 (11-24-22)        BMI (kg/m2): 31.1 (11-24-22)    ECMO  RPM:  3600 (06 Dec 2022 22:00)      Pump Flow (Lpm):  3.95 (06 Dec 2022 22:00)              Sweep  (L/min):   2.5 (06 Dec 2022 22:00)       FiO2 (%):  1 (06 Dec 2022 22:00)  Pre-membrane -  Pressure (mm/Hg):   192 (06 Dec 2022 22:00)      Post-membrane - Pressure (mm/Hg):  165 (06 Dec 2022 22:00)           norepinephrine Infusion 0.05 MICROgram(s)/kG/Min IV Continuous <Continuous>    Vent Mode: AC/ CMV (Assist Control/ Continuous Mandatory Ventilation)  RR (machine): 14, FiO2: 40, PEEP: 12, MAP: 19, PC: 10, PIP: 23    (06 Dec 2022 20:26) pH, Art: 7.37/pCO2: 35/pO2: 173/HCO3: 20/BE: -4.6/SaO2: 99.0/LAC: 1.1, --   (06 Dec 2022 14:26) pH, Art: 7.38/pCO2: 38/pO2: 153/HCO3: 22/BE: -2.4/SaO2: 99.2/LAC: 1.0, --   (06 Dec 2022 09:50) pH, Art: 7.38/pCO2: 44/pO2: 396/HCO3: 26/BE: 0.7/SaO2: 98.9/LAC: 1.4, --   (06 Dec 2022 09:37) pH, Art: 7.42/pCO2: 38/pO2: 163/HCO3: 25/BE: 0.2/SaO2: 98.8/LAC: 0.8, --   (06 Dec 2022 09:22) pH, Art: 7.43/pCO2: 39/pO2: 153/HCO3: 26/BE: 1.5/SaO2: 98.6/LAC: 0.9, --     (06 Dec 2022 14:40) pH, Kevin: 7.33/pCO2: 44/pO2: 47 /HCO3: 23/BE: -2.8/MVO2: /SvO2: 79.5/LAC: ,   (06 Dec 2022 09:37) pH, Kevin: 7.37/pCO2: 45/pO2: 36 /HCO3: 26/BE: 0.3/MVO2: /SvO2: 64.0/LAC: ,     ----------------------------------------------------------------------------------------------------  ANTICOAGULATION  argatroban Infusion 0.075 MICROgram(s)/kG/Min IV Continuous <Continuous>    (06 Dec 2022 17:46)  aPTT: 44.7  Xa: 0.06  PT: -----  INR: -----   Fibrinogen: -----  Platelets: -----  (06 Dec 2022 17:37)  aPTT: -----  Xa: -----  PT: -----  INR: -----   Fibrinogen: -----  Platelets: 157   (06 Dec 2022 14:47)  aPTT: 43.2  Xa: 0.05  PT: 15.2  INR: 1.32   Fibrinogen: -----  Platelets: -----  (06 Dec 2022 05:16)  aPTT: 45.1  Xa: 0.06  PT: -----  INR: -----   Fibrinogen: -----  Platelets: -----    (06 Dec 2022 17:37)  Hemoglobin: 8.3     LDH: -----    Haptoglobin: -----   PFH:  -----  (06 Dec 2022 00:40)  Hemoglobin: 8.3     LDH: 442     Haptoglobin: 171    PFH:  -----  (05 Dec 2022 18:54)  Hemoglobin: 8.3     LDH: -----    Haptoglobin: -----   PFH:  -----  (05 Dec 2022 00:57)  Hemoglobin: -----    LDH: -----    Haptoglobin: 202    PFH:  -----    ----------------------------------------------------------------------------------------------------  Daily ECMO Checklist:   Progress report received from perfusionist   Circuit checked/inspected   Emergency equipment and supplies checked   Cannulation site inspection     I have reviewed all of the above information as well as pertinent labs/imaging/testing and care plan with the bedside nurse, perfusionist and care team members and provided my recommendations for support.   ECMO Management was separate from critical care billing time.

## 2022-12-06 NOTE — PROGRESS NOTE ADULT - PROBLEM SELECTOR PLAN 2
- CT abd showing acute pancreatitis  - RUQ showing sludge, no stones  - lipase > 3000 11/24, normalized prior and now fluctuating, today improved to 737   - conservative care  - appreciate GI recs, no intervention planned   - surgery following   - on empiric meropenem  - had a low grade fever despite abx, f/u infectious workup.

## 2022-12-06 NOTE — PROGRESS NOTE ADULT - PROBLEM SELECTOR PLAN 1
Pt with non oliguric ERIC/ ATN in the setting of STEMI, cardiac arrest & cardiogenic shock  SCr on arrival was 2.15; trended down to hector 1.6 on 11/25;  slowly trended up & increased to 3.95 11/28.     Pt received IV diuretics. SCr increased to 4.19 with BUN of 101 on 12/5/22. Pt with significant volume overload. Pt initated on CRRT/CVVHDF to optimize volume and electrolytes on 12/5/22. Pt. currently tolerating CRRT. BP being maintained on vasopressors. Pt likely with ATN. Plan is to continue CRRT today as per discussion with CTU team. Monitor labs and urine output. Avoid any potential nephrotoxins. Dose medications as per eGFR.

## 2022-12-07 ENCOUNTER — TRANSCRIPTION ENCOUNTER (OUTPATIENT)
Age: 62
End: 2022-12-07

## 2022-12-07 LAB
ALBUMIN SERPL ELPH-MCNC: 3.1 G/DL — LOW (ref 3.3–5)
ALP SERPL-CCNC: 238 U/L — HIGH (ref 40–120)
ALT FLD-CCNC: 9 U/L — LOW (ref 10–45)
AMYLASE P1 CFR SERPL: 243 U/L — HIGH (ref 25–125)
ANION GAP SERPL CALC-SCNC: 14 MMOL/L — SIGNIFICANT CHANGE UP (ref 5–17)
APTT BLD: 39.9 SEC — HIGH (ref 27.5–35.5)
APTT BLD: 40.9 SEC — HIGH (ref 27.5–35.5)
APTT BLD: 43.3 SEC — HIGH (ref 27.5–35.5)
AST SERPL-CCNC: 64 U/L — HIGH (ref 10–40)
BASE EXCESS BLDV CALC-SCNC: -1.5 MMOL/L — SIGNIFICANT CHANGE UP (ref -2–3)
BILIRUB SERPL-MCNC: 1.1 MG/DL — SIGNIFICANT CHANGE UP (ref 0.2–1.2)
BUN SERPL-MCNC: 40 MG/DL — HIGH (ref 7–23)
CALCIUM SERPL-MCNC: 8.8 MG/DL — SIGNIFICANT CHANGE UP (ref 8.4–10.5)
CHLORIDE SERPL-SCNC: 102 MMOL/L — SIGNIFICANT CHANGE UP (ref 96–108)
CO2 BLDV-SCNC: 26 MMOL/L — SIGNIFICANT CHANGE UP (ref 22–26)
CO2 SERPL-SCNC: 23 MMOL/L — SIGNIFICANT CHANGE UP (ref 22–31)
CREAT SERPL-MCNC: 1.91 MG/DL — HIGH (ref 0.5–1.3)
EGFR: 39 ML/MIN/1.73M2 — LOW
FIBRINOGEN PPP-MCNC: 221 MG/DL — SIGNIFICANT CHANGE UP (ref 200–445)
GAS PNL BLDA: SIGNIFICANT CHANGE UP
GLUCOSE BLDC GLUCOMTR-MCNC: 122 MG/DL — HIGH (ref 70–99)
GLUCOSE BLDC GLUCOMTR-MCNC: 127 MG/DL — HIGH (ref 70–99)
GLUCOSE BLDC GLUCOMTR-MCNC: 130 MG/DL — HIGH (ref 70–99)
GLUCOSE BLDC GLUCOMTR-MCNC: 133 MG/DL — HIGH (ref 70–99)
GLUCOSE BLDC GLUCOMTR-MCNC: 140 MG/DL — HIGH (ref 70–99)
GLUCOSE BLDC GLUCOMTR-MCNC: 141 MG/DL — HIGH (ref 70–99)
GLUCOSE BLDC GLUCOMTR-MCNC: 143 MG/DL — HIGH (ref 70–99)
GLUCOSE BLDC GLUCOMTR-MCNC: 144 MG/DL — HIGH (ref 70–99)
GLUCOSE BLDC GLUCOMTR-MCNC: 146 MG/DL — HIGH (ref 70–99)
GLUCOSE BLDC GLUCOMTR-MCNC: 147 MG/DL — HIGH (ref 70–99)
GLUCOSE BLDC GLUCOMTR-MCNC: 148 MG/DL — HIGH (ref 70–99)
GLUCOSE BLDC GLUCOMTR-MCNC: 152 MG/DL — HIGH (ref 70–99)
GLUCOSE BLDC GLUCOMTR-MCNC: 153 MG/DL — HIGH (ref 70–99)
GLUCOSE BLDC GLUCOMTR-MCNC: 154 MG/DL — HIGH (ref 70–99)
GLUCOSE BLDC GLUCOMTR-MCNC: 169 MG/DL — HIGH (ref 70–99)
GLUCOSE BLDC GLUCOMTR-MCNC: 174 MG/DL — HIGH (ref 70–99)
GLUCOSE BLDC GLUCOMTR-MCNC: 177 MG/DL — HIGH (ref 70–99)
GLUCOSE SERPL-MCNC: 131 MG/DL — HIGH (ref 70–99)
HAPTOGLOB SERPL-MCNC: 139 MG/DL — SIGNIFICANT CHANGE UP (ref 34–200)
HCO3 BLDV-SCNC: 25 MMOL/L — SIGNIFICANT CHANGE UP (ref 22–29)
HCT VFR BLD CALC: 25.8 % — LOW (ref 39–50)
HGB BLD-MCNC: 8.3 G/DL — LOW (ref 13–17)
INR BLD: 1.3 RATIO — HIGH (ref 0.88–1.16)
LDH SERPL L TO P-CCNC: 440 U/L — HIGH (ref 50–242)
LIDOCAIN IGE QN: 397 U/L — HIGH (ref 7–60)
MAGNESIUM SERPL-MCNC: 2.6 MG/DL — SIGNIFICANT CHANGE UP (ref 1.6–2.6)
MCHC RBC-ENTMCNC: 28.8 PG — SIGNIFICANT CHANGE UP (ref 27–34)
MCHC RBC-ENTMCNC: 32.2 GM/DL — SIGNIFICANT CHANGE UP (ref 32–36)
MCV RBC AUTO: 89.6 FL — SIGNIFICANT CHANGE UP (ref 80–100)
NRBC # BLD: 0 /100 WBCS — SIGNIFICANT CHANGE UP (ref 0–0)
PCO2 BLDV: 47 MMHG — SIGNIFICANT CHANGE UP (ref 42–55)
PH BLDV: 7.33 — SIGNIFICANT CHANGE UP (ref 7.32–7.43)
PHOSPHATE SERPL-MCNC: 4.1 MG/DL — SIGNIFICANT CHANGE UP (ref 2.5–4.5)
PLATELET # BLD AUTO: 159 K/UL — SIGNIFICANT CHANGE UP (ref 150–400)
PO2 BLDV: 35 MMHG — SIGNIFICANT CHANGE UP (ref 25–45)
POTASSIUM SERPL-MCNC: 3.6 MMOL/L — SIGNIFICANT CHANGE UP (ref 3.5–5.3)
POTASSIUM SERPL-SCNC: 3.6 MMOL/L — SIGNIFICANT CHANGE UP (ref 3.5–5.3)
PROT SERPL-MCNC: 6.3 G/DL — SIGNIFICANT CHANGE UP (ref 6–8.3)
PROTHROM AB SERPL-ACNC: 15 SEC — HIGH (ref 10.5–13.4)
RBC # BLD: 2.88 M/UL — LOW (ref 4.2–5.8)
RBC # FLD: 15.2 % — HIGH (ref 10.3–14.5)
SAO2 % BLDV: 66.8 % — LOW (ref 67–88)
SARS-COV-2 RNA SPEC QL NAA+PROBE: SIGNIFICANT CHANGE UP
SODIUM SERPL-SCNC: 139 MMOL/L — SIGNIFICANT CHANGE UP (ref 135–145)
WBC # BLD: 12.78 K/UL — HIGH (ref 3.8–10.5)
WBC # FLD AUTO: 12.78 K/UL — HIGH (ref 3.8–10.5)

## 2022-12-07 PROCEDURE — 99292 CRITICAL CARE ADDL 30 MIN: CPT

## 2022-12-07 PROCEDURE — 99291 CRITICAL CARE FIRST HOUR: CPT

## 2022-12-07 PROCEDURE — 99291 CRITICAL CARE FIRST HOUR: CPT | Mod: GC

## 2022-12-07 PROCEDURE — 99232 SBSQ HOSP IP/OBS MODERATE 35: CPT

## 2022-12-07 PROCEDURE — 71045 X-RAY EXAM CHEST 1 VIEW: CPT | Mod: 26

## 2022-12-07 PROCEDURE — 93306 TTE W/DOPPLER COMPLETE: CPT | Mod: 26

## 2022-12-07 PROCEDURE — 33949 ECMO/ECLS DAILY MGMT ARTERY: CPT

## 2022-12-07 PROCEDURE — 99233 SBSQ HOSP IP/OBS HIGH 50: CPT | Mod: GC

## 2022-12-07 RX ORDER — POTASSIUM CHLORIDE 20 MEQ
20 PACKET (EA) ORAL ONCE
Refills: 0 | Status: COMPLETED | OUTPATIENT
Start: 2022-12-07 | End: 2022-12-07

## 2022-12-07 RX ORDER — MAGNESIUM SULFATE 500 MG/ML
2 VIAL (ML) INJECTION ONCE
Refills: 0 | Status: COMPLETED | OUTPATIENT
Start: 2022-12-07 | End: 2022-12-07

## 2022-12-07 RX ORDER — POTASSIUM CHLORIDE 20 MEQ
10 PACKET (EA) ORAL ONCE
Refills: 0 | Status: COMPLETED | OUTPATIENT
Start: 2022-12-07 | End: 2022-12-07

## 2022-12-07 RX ADMIN — ARGATROBAN 0.5 MICROGRAM(S)/KG/MIN: 50 INJECTION, SOLUTION INTRAVENOUS at 12:59

## 2022-12-07 RX ADMIN — Medication 20 MILLIEQUIVALENT(S): at 15:27

## 2022-12-07 RX ADMIN — Medication 8.71 MICROGRAM(S)/KG/MIN: at 13:00

## 2022-12-07 RX ADMIN — DEXMEDETOMIDINE HYDROCHLORIDE IN 0.9% SODIUM CHLORIDE 34.8 MICROGRAM(S)/KG/HR: 4 INJECTION INTRAVENOUS at 19:19

## 2022-12-07 RX ADMIN — Medication 1 DROP(S): at 17:08

## 2022-12-07 RX ADMIN — CHLORHEXIDINE GLUCONATE 15 MILLILITER(S): 213 SOLUTION TOPICAL at 05:24

## 2022-12-07 RX ADMIN — Medication 25 GRAM(S): at 15:27

## 2022-12-07 RX ADMIN — Medication 1 DROP(S): at 11:35

## 2022-12-07 RX ADMIN — Medication 1 DROP(S): at 05:23

## 2022-12-07 RX ADMIN — Medication 50 MILLIEQUIVALENT(S): at 02:09

## 2022-12-07 RX ADMIN — SODIUM CHLORIDE 5 MILLILITER(S): 9 INJECTION INTRAMUSCULAR; INTRAVENOUS; SUBCUTANEOUS at 19:20

## 2022-12-07 RX ADMIN — HYDROMORPHONE HYDROCHLORIDE 0.5 MILLIGRAM(S): 2 INJECTION INTRAMUSCULAR; INTRAVENOUS; SUBCUTANEOUS at 11:34

## 2022-12-07 RX ADMIN — DEXMEDETOMIDINE HYDROCHLORIDE IN 0.9% SODIUM CHLORIDE 34.8 MICROGRAM(S)/KG/HR: 4 INJECTION INTRAVENOUS at 13:00

## 2022-12-07 RX ADMIN — HYDROMORPHONE HYDROCHLORIDE 0.5 MILLIGRAM(S): 2 INJECTION INTRAMUSCULAR; INTRAVENOUS; SUBCUTANEOUS at 06:15

## 2022-12-07 RX ADMIN — PANTOPRAZOLE SODIUM 40 MILLIGRAM(S): 20 TABLET, DELAYED RELEASE ORAL at 05:23

## 2022-12-07 RX ADMIN — HYDROMORPHONE HYDROCHLORIDE 0.5 MILLIGRAM(S): 2 INJECTION INTRAMUSCULAR; INTRAVENOUS; SUBCUTANEOUS at 00:15

## 2022-12-07 RX ADMIN — CHLORHEXIDINE GLUCONATE 15 MILLILITER(S): 213 SOLUTION TOPICAL at 17:09

## 2022-12-07 RX ADMIN — ARGATROBAN 0.73 MICROGRAM(S)/KG/MIN: 50 INJECTION, SOLUTION INTRAVENOUS at 19:19

## 2022-12-07 RX ADMIN — HYDROMORPHONE HYDROCHLORIDE 0.5 MILLIGRAM(S): 2 INJECTION INTRAMUSCULAR; INTRAVENOUS; SUBCUTANEOUS at 17:08

## 2022-12-07 RX ADMIN — INSULIN HUMAN 6 UNIT(S)/HR: 100 INJECTION, SOLUTION SUBCUTANEOUS at 19:19

## 2022-12-07 RX ADMIN — Medication 20 MILLIEQUIVALENT(S): at 08:34

## 2022-12-07 RX ADMIN — INSULIN HUMAN 6 UNIT(S)/HR: 100 INJECTION, SOLUTION SUBCUTANEOUS at 13:00

## 2022-12-07 RX ADMIN — Medication 20 MILLIEQUIVALENT(S): at 18:45

## 2022-12-07 RX ADMIN — CHLORHEXIDINE GLUCONATE 1 APPLICATION(S): 213 SOLUTION TOPICAL at 05:22

## 2022-12-07 RX ADMIN — SODIUM CHLORIDE 5 MILLILITER(S): 9 INJECTION INTRAMUSCULAR; INTRAVENOUS; SUBCUTANEOUS at 13:00

## 2022-12-07 RX ADMIN — ATORVASTATIN CALCIUM 40 MILLIGRAM(S): 80 TABLET, FILM COATED ORAL at 23:56

## 2022-12-07 RX ADMIN — HYDROMORPHONE HYDROCHLORIDE 0.5 MILLIGRAM(S): 2 INJECTION INTRAMUSCULAR; INTRAVENOUS; SUBCUTANEOUS at 06:00

## 2022-12-07 RX ADMIN — Medication 8.71 MICROGRAM(S)/KG/MIN: at 19:19

## 2022-12-07 RX ADMIN — PANTOPRAZOLE SODIUM 40 MILLIGRAM(S): 20 TABLET, DELAYED RELEASE ORAL at 17:09

## 2022-12-07 NOTE — PROGRESS NOTE ADULT - SUBJECTIVE AND OBJECTIVE BOX
infectious diseases progress note:    Patient is a 62y old  Male who presents with a chief complaint of Acute cholecystitis, gallstone pancreatitis (07 Dec 2022 07:51)        Acute pancreatitis without infection or necrosis           s    Allergies    No Known Allergies    Intolerances        ANTIBIOTICS/RELEVANT:  antimicrobials    immunologic:    OTHER:  argatroban Infusion 0.075 MICROgram(s)/kG/Min IV Continuous <Continuous>  artificial  tears Solution 1 Drop(s) Both EYES four times a day  atorvastatin 40 milliGRAM(s) Oral at bedtime  chlorhexidine 0.12% Liquid 15 milliLiter(s) Oral Mucosa every 12 hours  chlorhexidine 2% Cloths 1 Application(s) Topical <User Schedule>  CRRT Treatment    <Continuous>  dexMEDEtomidine Infusion 1.5 MICROgram(s)/kG/Hr IV Continuous <Continuous>  dextrose 50% Injectable 50 milliLiter(s) IV Push every 15 minutes  HYDROmorphone  Injectable 0.5 milliGRAM(s) IV Push every 6 hours  insulin regular Infusion 6 Unit(s)/Hr IV Continuous <Continuous>  norepinephrine Infusion 0.05 MICROgram(s)/kG/Min IV Continuous <Continuous>  pantoprazole  Injectable 40 milliGRAM(s) IV Push two times a day  polyethylene glycol 3350 17 Gram(s) Oral two times a day  PrismaSATE Dialysate BK 0 / 3.5 5000 milliLiter(s) CRRT <Continuous>  PrismaSOL Filtration BGK 4 / 2.5 5000 milliLiter(s) CRRT <Continuous>  PrismaSOL Filtration BGK 4 / 2.5 5000 milliLiter(s) CRRT <Continuous>  propofol Infusion 17.94 MICROgram(s)/kG/Min IV Continuous <Continuous>  senna 2 Tablet(s) Oral at bedtime  sodium chloride 0.9% lock flush 10 milliLiter(s) IV Push every 1 hour PRN  sodium chloride 0.9%. 1000 milliLiter(s) IV Continuous <Continuous>      Objective:  Vital Signs Last 24 Hrs  T(C): 36.4 (07 Dec 2022 08:00), Max: 36.9 (06 Dec 2022 16:00)  T(F): 97.5 (07 Dec 2022 08:00), Max: 98.4 (06 Dec 2022 16:00)  HR: 83 (07 Dec 2022 08:30) (80 - 184)  BP: --  BP(mean): --  RR: 13 (07 Dec 2022 08:30) (8 - 25)  SpO2: 100% (07 Dec 2022 08:30) (100% - 100%)    Parameters below as of 07 Dec 2022 08:00  Patient On (Oxygen Delivery Method): ventilator    O2 Concentration (%): 40       Eyes:INOCENCIO, EOMI  Ear/Nose/Throat: no oral lesion, no sinus tenderness on percussion	  Neck:no JVD, no lymphadenopathy, supple  Respiratory: CTA lore  Cardiovascular: S1S2 RRR, no murmurs  Gastrointestinal:soft, (+) BS, no HSM  Extremities:no e/e/c        LABS:                        8.3    12.78 )-----------( 159      ( 07 Dec 2022 01:33 )             25.8     12-07    139  |  102  |  40<H>  ----------------------------<  131<H>  3.6   |  23  |  1.91<H>    Ca    8.8      07 Dec 2022 01:32  Phos  4.1     12-07  Mg     2.6     12-07    TPro  6.3  /  Alb  3.1<L>  /  TBili  1.1  /  DBili  x   /  AST  64<H>  /  ALT  9<L>  /  AlkPhos  238<H>  12-07    PT/INR - ( 07 Dec 2022 01:35 )   PT: 15.0 sec;   INR: 1.30 ratio         PTT - ( 07 Dec 2022 01:35 )  PTT:40.9 sec        MICROBIOLOGY:    RECENT CULTURES:  12-03 @ 17:05 .Blood Blood                No growth to date.    12-03 @ 15:29 ET Tube ET Tube       Rare polymorphonuclear leukocytes per low power field  No Squamous epithelial cells per low power field  No organisms seen per oil power field           Normal Respiratory Christy present    12-03 @ 14:18 .Blood Blood                No growth to date.    12-01 @ 17:46 .Bronchial Bronchial Lavage       Rare polymorphonuclear leukocytes per low power field  No Squamous epithelial cells per low power field  No organisms seen per oil power field           No growth at 48 hours    12-01 @ 13:20 .Blood Blood-Peripheral                No Growth Final    11-30 @ 13:10 .Bronchial Bronchial Lavage       Rare polymorphonuclear leukocytes per low power field  No squamous epithelial cells per low power field  No organisms seen           No growth          RESPIRATORY CULTURES:              RADIOLOGY & ADDITIONAL STUDIES:        Pager [phone]  After 5 pm/weekends or if no response :[phone] infectious diseases progress note:    Patient is a 62y old  Male who presents with a chief complaint of Acute cholecystitis, gallstone pancreatitis (07 Dec 2022 07:51)        Acute pancreatitis without infection or necrosis           s    Allergies    No Known Allergies    Intolerances        ANTIBIOTICS/RELEVANT:  antimicrobials    immunologic:    OTHER:  argatroban Infusion 0.075 MICROgram(s)/kG/Min IV Continuous <Continuous>  artificial  tears Solution 1 Drop(s) Both EYES four times a day  atorvastatin 40 milliGRAM(s) Oral at bedtime  chlorhexidine 0.12% Liquid 15 milliLiter(s) Oral Mucosa every 12 hours  chlorhexidine 2% Cloths 1 Application(s) Topical <User Schedule>  CRRT Treatment    <Continuous>  dexMEDEtomidine Infusion 1.5 MICROgram(s)/kG/Hr IV Continuous <Continuous>  dextrose 50% Injectable 50 milliLiter(s) IV Push every 15 minutes  HYDROmorphone  Injectable 0.5 milliGRAM(s) IV Push every 6 hours  insulin regular Infusion 6 Unit(s)/Hr IV Continuous <Continuous>  norepinephrine Infusion 0.05 MICROgram(s)/kG/Min IV Continuous <Continuous>  pantoprazole  Injectable 40 milliGRAM(s) IV Push two times a day  polyethylene glycol 3350 17 Gram(s) Oral two times a day  PrismaSATE Dialysate BK 0 / 3.5 5000 milliLiter(s) CRRT <Continuous>  PrismaSOL Filtration BGK 4 / 2.5 5000 milliLiter(s) CRRT <Continuous>  PrismaSOL Filtration BGK 4 / 2.5 5000 milliLiter(s) CRRT <Continuous>  propofol Infusion 17.94 MICROgram(s)/kG/Min IV Continuous <Continuous>  senna 2 Tablet(s) Oral at bedtime  sodium chloride 0.9% lock flush 10 milliLiter(s) IV Push every 1 hour PRN  sodium chloride 0.9%. 1000 milliLiter(s) IV Continuous <Continuous>      Objective:  Vital Signs Last 24 Hrs  T(C): 36.4 (07 Dec 2022 08:00), Max: 36.9 (06 Dec 2022 16:00)  T(F): 97.5 (07 Dec 2022 08:00), Max: 98.4 (06 Dec 2022 16:00)  HR: 83 (07 Dec 2022 08:30) (80 - 184)  BP: --  BP(mean): --  RR: 13 (07 Dec 2022 08:30) (8 - 25)  SpO2: 100% (07 Dec 2022 08:30) (100% - 100%)    Parameters below as of 07 Dec 2022 08:00  Patient On (Oxygen Delivery Method): ventilator    O2 Concentration (%): 40       Eyes:INOCENCIO, EOMI  Ear/Nose/Throat: no oral lesion, no sinus tenderness on percussion	  Neck:no JVD, no lymphadenopathy, supple  Respiratory: CTA lore  Cardiovascular: S1S2 RRR, no murmurs  Gastrointestinal:soft, (+) BS, no HSM  Extremities:no e/e/c        LABS:                        8.3    12.78 )-----------( 159      ( 07 Dec 2022 01:33 )             25.8     12-07    139  |  102  |  40<H>  ----------------------------<  131<H>  3.6   |  23  |  1.91<H>    Ca    8.8      07 Dec 2022 01:32  Phos  4.1     12-07  Mg     2.6     12-07    TPro  6.3  /  Alb  3.1<L>  /  TBili  1.1  /  DBili  x   /  AST  64<H>  /  ALT  9<L>  /  AlkPhos  238<H>  12-07    PT/INR - ( 07 Dec 2022 01:35 )   PT: 15.0 sec;   INR: 1.30 ratio         PTT - ( 07 Dec 2022 01:35 )  PTT:40.9 sec        MICROBIOLOGY:    RECENT CULTURES:  12-03 @ 17:05 .Blood Blood                No growth to date.    12-03 @ 15:29 ET Tube ET Tube       Rare polymorphonuclear leukocytes per low power field  No Squamous epithelial cells per low power field  No organisms seen per oil power field           Normal Respiratory Christy present    12-03 @ 14:18 .Blood Blood                No growth to date.    12-01 @ 17:46 .Bronchial Bronchial Lavage       Rare polymorphonuclear leukocytes per low power field  No Squamous epithelial cells per low power field  No organisms seen per oil power field           No growth at 48 hours    12-01 @ 13:20 .Blood Blood-Peripheral                No Growth Final    11-30 @ 13:10 .Bronchial Bronchial Lavage       Rare polymorphonuclear leukocytes per low power field  No squamous epithelial cells per low power field  No organisms seen           No growth          RESPIRATORY CULTURES:              RADIOLOGY & ADDITIONAL STUDIES:        Pager [phone]  After 5 pm/weekends or if no response :[phone] infectious diseases progress note:    Patient is a 62y old  Male who presents with a chief complaint of Acute cholecystitis, gallstone pancreatitis (07 Dec 2022 07:51)        Acute pancreatitis without infection or necrosis           s    Allergies    No Known Allergies    Intolerances        ANTIBIOTICS/RELEVANT:  antimicrobials    immunologic:    OTHER:  argatroban Infusion 0.075 MICROgram(s)/kG/Min IV Continuous <Continuous>  artificial  tears Solution 1 Drop(s) Both EYES four times a day  atorvastatin 40 milliGRAM(s) Oral at bedtime  chlorhexidine 0.12% Liquid 15 milliLiter(s) Oral Mucosa every 12 hours  chlorhexidine 2% Cloths 1 Application(s) Topical <User Schedule>  CRRT Treatment    <Continuous>  dexMEDEtomidine Infusion 1.5 MICROgram(s)/kG/Hr IV Continuous <Continuous>  dextrose 50% Injectable 50 milliLiter(s) IV Push every 15 minutes  HYDROmorphone  Injectable 0.5 milliGRAM(s) IV Push every 6 hours  insulin regular Infusion 6 Unit(s)/Hr IV Continuous <Continuous>  norepinephrine Infusion 0.05 MICROgram(s)/kG/Min IV Continuous <Continuous>  pantoprazole  Injectable 40 milliGRAM(s) IV Push two times a day  polyethylene glycol 3350 17 Gram(s) Oral two times a day  PrismaSATE Dialysate BK 0 / 3.5 5000 milliLiter(s) CRRT <Continuous>  PrismaSOL Filtration BGK 4 / 2.5 5000 milliLiter(s) CRRT <Continuous>  PrismaSOL Filtration BGK 4 / 2.5 5000 milliLiter(s) CRRT <Continuous>  propofol Infusion 17.94 MICROgram(s)/kG/Min IV Continuous <Continuous>  senna 2 Tablet(s) Oral at bedtime  sodium chloride 0.9% lock flush 10 milliLiter(s) IV Push every 1 hour PRN  sodium chloride 0.9%. 1000 milliLiter(s) IV Continuous <Continuous>      Objective:  Vital Signs Last 24 Hrs  T(C): 36.4 (07 Dec 2022 08:00), Max: 36.9 (06 Dec 2022 16:00)  T(F): 97.5 (07 Dec 2022 08:00), Max: 98.4 (06 Dec 2022 16:00)  HR: 83 (07 Dec 2022 08:30) (80 - 184)  BP: --  BP(mean): --  RR: 13 (07 Dec 2022 08:30) (8 - 25)  SpO2: 100% (07 Dec 2022 08:30) (100% - 100%)    Parameters below as of 07 Dec 2022 08:00  Patient On (Oxygen Delivery Method): ventilator    O2 Concentration (%): 40       Eyes:INOCENCIO, EOMI  Ear/Nose/Throat: no oral lesion, no sinus tenderness on percussion	  Neck:no JVD, no lymphadenopathy, supple  Respiratory: CTA lore  Cardiovascular: S1S2 RRR, no murmurs  Gastrointestinal:soft, (+) BS, no HSM  Extremities:no e/e/c        LABS:                        8.3    12.78 )-----------( 159      ( 07 Dec 2022 01:33 )             25.8     12-07    139  |  102  |  40<H>  ----------------------------<  131<H>  3.6   |  23  |  1.91<H>    Ca    8.8      07 Dec 2022 01:32  Phos  4.1     12-07  Mg     2.6     12-07    TPro  6.3  /  Alb  3.1<L>  /  TBili  1.1  /  DBili  x   /  AST  64<H>  /  ALT  9<L>  /  AlkPhos  238<H>  12-07    PT/INR - ( 07 Dec 2022 01:35 )   PT: 15.0 sec;   INR: 1.30 ratio         PTT - ( 07 Dec 2022 01:35 )  PTT:40.9 sec        MICROBIOLOGY:    RECENT CULTURES:  12-03 @ 17:05 .Blood Blood                No growth to date.    12-03 @ 15:29 ET Tube ET Tube       Rare polymorphonuclear leukocytes per low power field  No Squamous epithelial cells per low power field  No organisms seen per oil power field           Normal Respiratory Christy present    12-03 @ 14:18 .Blood Blood                No growth to date.    12-01 @ 17:46 .Bronchial Bronchial Lavage       Rare polymorphonuclear leukocytes per low power field  No Squamous epithelial cells per low power field  No organisms seen per oil power field           No growth at 48 hours    12-01 @ 13:20 .Blood Blood-Peripheral                No Growth Final    11-30 @ 13:10 .Bronchial Bronchial Lavage       Rare polymorphonuclear leukocytes per low power field  No squamous epithelial cells per low power field  No organisms seen           No growth          RESPIRATORY CULTURES:              RADIOLOGY & ADDITIONAL STUDIES:        Pager [phone]  After 5 pm/weekends or if no response :[phone]

## 2022-12-07 NOTE — PROGRESS NOTE ADULT - PROBLEM SELECTOR PLAN 5
- small 3mm subdural hemorrhage stable on repeat CTs  - okay to continue argatroban, appreciate neuro recs  - keep MAP < 75 mmHg and careful monitoring of PTT  - repeat CT head today to assess for interval changes.

## 2022-12-07 NOTE — PROGRESS NOTE ADULT - PROBLEM SELECTOR PLAN 1
- troponin peaked at > 93721   - [facility] 12/06 with IABP placement revealed that 3 grafts are closed w/ distal native disease from remaining LAD graft  - continue statin  - would start ASA when okay with surgical team. - troponin peaked at > 19937   - [facility] 12/06 with IABP placement revealed that 3 grafts are closed w/ distal native disease from remaining LAD graft  - continue statin  - would start ASA when okay with surgical team. - troponin peaked at > 61808   - [facility] 12/06 with IABP placement revealed that 3 grafts are closed w/ distal native disease from remaining LAD graft  - continue statin  - would start ASA when okay with surgical team.

## 2022-12-07 NOTE — PROGRESS NOTE ADULT - ASSESSMENT
Lipase improved   ----------------------------------------------------------------------------------------------------  62yM w/ h/o CABG 3 yrs ago  [facility]  [facility]  for:  eCPR, Refractory cardiogenic shock   Date of initiation: 11/25/2022       Cannulae:  19Fr R Fem Arterial (no distal perfusion),  25Fr L Fem Venous  L fem IABP (12/6)     Cannulation sites dressings intact, small amount of dried blood     Continue current support, weaning trial w/echo today   Cont CVVH  Anticoagulation w/argatroban, PTT 40-50, has known Posterior falx SDH, rpt head CT 12/6 stable   Monitor extremities, stable pulse exam currently, R thigh slightly larger than L   Plan for decannulation pending weaning trial, no interveneable coronary disease or advanced therapy options at this time     =============================================================  Weight (kg): 92.9 (11-24-22)        Height (cm): 172.7 (11-24-22)   BSA (m2): 2.06 (11-24-22)        BMI (kg/m2): 31.1 (11-24-22)    ECMO  RPM:  3600 (07 Dec 2022 11:00)      Pump Flow (Lpm):  4.1 (07 Dec 2022 11:00)              Sweep  (L/min):   2.5 (07 Dec 2022 11:00)       FiO2 (%):  1 (07 Dec 2022 11:00)  Pre-membrane -  Pressure (mm/Hg):   193 (07 Dec 2022 11:00)      Post-membrane - Pressure (mm/Hg):  166 (07 Dec 2022 11:00)           norepinephrine Infusion 0.05 MICROgram(s)/kG/Min IV Continuous <Continuous>    Vent Mode: AC/ CMV (Assist Control/ Continuous Mandatory Ventilation)  RR (machine): 14, FiO2: 40, PEEP: 12, PS: 40, MAP: 15, PC: 10, PIP: 24    (07 Dec 2022 07:00) pH, Art: 7.35/pCO2: 44/pO2: 424/HCO3: 24/BE: -1.3/SaO2: 99.7/LAC: 1.5, --   (07 Dec 2022 01:00) pH, Art: 7.37/pCO2: 39/pO2: 154/HCO3: 22/BE: -2.6/SaO2: 99.3/LAC: 0.9, --   (06 Dec 2022 20:26) pH, Art: 7.37/pCO2: 35/pO2: 173/HCO3: 20/BE: -4.6/SaO2: 99.0/LAC: 1.1, --   (06 Dec 2022 14:26) pH, Art: 7.38/pCO2: 38/pO2: 153/HCO3: 22/BE: -2.4/SaO2: 99.2/LAC: 1.0, --     (07 Dec 2022 01:00) pH, Kevin: 7.33/pCO2: 47/pO2: 35 /HCO3: 25/BE: -1.5/MVO2: /SvO2: 66.8/LAC: ,   (06 Dec 2022 14:40) pH, Kevin: 7.33/pCO2: 44/pO2: 47 /HCO3: 23/BE: -2.8/MVO2: /SvO2: 79.5/LAC: ,     ----------------------------------------------------------------------------------------------------  ANTICOAGULATION  argatroban Infusion 0.09 MICROgram(s)/kG/Min IV Continuous <Continuous>    (07 Dec 2022 01:35)  aPTT: 40.9  Xa: 0.05  PT: 15.0  INR: 1.30   Fibrinogen: 221   Platelets: -----  (07 Dec 2022 01:33)  aPTT: -----  Xa: -----  PT: -----  INR: -----   Fibrinogen: -----  Platelets: 159   (06 Dec 2022 17:46)  aPTT: 44.7  Xa: 0.06  PT: -----  INR: -----   Fibrinogen: -----  Platelets: -----  (06 Dec 2022 17:37)  aPTT: -----  Xa: -----  PT: -----  INR: -----   Fibrinogen: -----  Platelets: 157     (07 Dec 2022 01:33)  Hemoglobin: 8.3     LDH: -----    Haptoglobin: -----   PFH:  -----  (07 Dec 2022 01:32)  Hemoglobin: -----    LDH: 440     Haptoglobin: 139    PFH:  -----  (06 Dec 2022 17:37)  Hemoglobin: 8.3     LDH: -----    Haptoglobin: -----   PFH:  -----  (06 Dec 2022 00:40)  Hemoglobin: 8.3     LDH: 442     Haptoglobin: 171    PFH:  -----    ----------------------------------------------------------------------------------------------------  Daily ECMO Checklist:   Progress report received from perfusionist   Circuit checked/inspected   Emergency equipment and supplies checked   Cannulation site inspection     I have reviewed all of the above information as well as pertinent labs/imaging/testing and care plan with the bedside nurse, perfusionist and care team members and provided my recommendations for support.   ECMO Management was separate from critical care billing time.     Lipase improved   ----------------------------------------------------------------------------------------------------  62yM w/ h/o CABG 3 yrs ago  [facility]  for:  eCPR, Refractory cardiogenic shock   Date of initiation: 11/25/2022       Cannulae:  19Fr R Fem Arterial (no distal perfusion),  25Fr L Fem Venous  L fem IABP (12/6)     Cannulation sites dressings intact, small amount of dried blood     Continue current support, weaning trial w/echo today   Cont CVVH  Anticoagulation w/argatroban, PTT 40-50, has known Posterior falx SDH, rpt head CT 12/6 stable   Monitor extremities, stable pulse exam currently, R thigh slightly larger than L   Plan for decannulation pending weaning trial, no interveneable coronary disease or advanced therapy options at this time     =============================================================  Weight (kg): 92.9 (11-24-22)        Height (cm): 172.7 (11-24-22)   BSA (m2): 2.06 (11-24-22)        BMI (kg/m2): 31.1 (11-24-22)    ECMO  RPM:  3600 (07 Dec 2022 11:00)      Pump Flow (Lpm):  4.1 (07 Dec 2022 11:00)              Sweep  (L/min):   2.5 (07 Dec 2022 11:00)       FiO2 (%):  1 (07 Dec 2022 11:00)  Pre-membrane -  Pressure (mm/Hg):   193 (07 Dec 2022 11:00)      Post-membrane - Pressure (mm/Hg):  166 (07 Dec 2022 11:00)           norepinephrine Infusion 0.05 MICROgram(s)/kG/Min IV Continuous <Continuous>    Vent Mode: AC/ CMV (Assist Control/ Continuous Mandatory Ventilation)  RR (machine): 14, FiO2: 40, PEEP: 12, PS: 40, MAP: 15, PC: 10, PIP: 24    (07 Dec 2022 07:00) pH, Art: 7.35/pCO2: 44/pO2: 424/HCO3: 24/BE: -1.3/SaO2: 99.7/LAC: 1.5, --   (07 Dec 2022 01:00) pH, Art: 7.37/pCO2: 39/pO2: 154/HCO3: 22/BE: -2.6/SaO2: 99.3/LAC: 0.9, --   (06 Dec 2022 20:26) pH, Art: 7.37/pCO2: 35/pO2: 173/HCO3: 20/BE: -4.6/SaO2: 99.0/LAC: 1.1, --   (06 Dec 2022 14:26) pH, Art: 7.38/pCO2: 38/pO2: 153/HCO3: 22/BE: -2.4/SaO2: 99.2/LAC: 1.0, --     (07 Dec 2022 01:00) pH, Keivn: 7.33/pCO2: 47/pO2: 35 /HCO3: 25/BE: -1.5/MVO2: /SvO2: 66.8/LAC: ,   (06 Dec 2022 14:40) pH, Kevin: 7.33/pCO2: 44/pO2: 47 /HCO3: 23/BE: -2.8/MVO2: /SvO2: 79.5/LAC: ,     ----------------------------------------------------------------------------------------------------  ANTICOAGULATION  argatroban Infusion 0.09 MICROgram(s)/kG/Min IV Continuous <Continuous>    (07 Dec 2022 01:35)  aPTT: 40.9  Xa: 0.05  PT: 15.0  INR: 1.30   Fibrinogen: 221   Platelets: -----  (07 Dec 2022 01:33)  aPTT: -----  Xa: -----  PT: -----  INR: -----   Fibrinogen: -----  Platelets: 159   (06 Dec 2022 17:46)  aPTT: 44.7  Xa: 0.06  PT: -----  INR: -----   Fibrinogen: -----  Platelets: -----  (06 Dec 2022 17:37)  aPTT: -----  Xa: -----  PT: -----  INR: -----   Fibrinogen: -----  Platelets: 157     (07 Dec 2022 01:33)  Hemoglobin: 8.3     LDH: -----    Haptoglobin: -----   PFH:  -----  (07 Dec 2022 01:32)  Hemoglobin: -----    LDH: 440     Haptoglobin: 139    PFH:  -----  (06 Dec 2022 17:37)  Hemoglobin: 8.3     LDH: -----    Haptoglobin: -----   PFH:  -----  (06 Dec 2022 00:40)  Hemoglobin: 8.3     LDH: 442     Haptoglobin: 171    PFH:  -----    ----------------------------------------------------------------------------------------------------  Daily ECMO Checklist:   Progress report received from perfusionist   Circuit checked/inspected   Emergency equipment and supplies checked   Cannulation site inspection     I have reviewed all of the above information as well as pertinent labs/imaging/testing and care plan with the bedside nurse, perfusionist and care team members and provided my recommendations for support.   ECMO Management was separate from critical care billing time.

## 2022-12-07 NOTE — PROGRESS NOTE ADULT - SUBJECTIVE AND OBJECTIVE BOX
[facility] DIVISION OF KIDNEY DISEASES AND HYPERTENSION   FOLLOW UP NOTE    --------------------------------------------------------------------------------  HPI:  63 y/o male with PMH of CABG, DM, HTN, HLD presenting as transfer from [place] for c/f STEMI. Course c/b gallstone pancreatitis leading to ARDS and shock & cardiac arrest now on [facility]. Had significant troponin elevation and his LVEF down to 8%. Pt was deemed to be too unstable to have an angiogram and potential PCI due to his co-morbidities & a new subdural bleed. Pt being seen for ERIC. SCr on arrival was 2.15; trended down to hector 1.6 on 11/25 and eventually increased to 3.95 11/28. Pt received IV diuretics as per CTU.    Patient seen & examined today. Pt is intubated, unable to obtain ROS. Pt initiated on CRRT on 12/5/22 to optimize volume status and electrolytes. Pt tolerating CRRT overnight with target net negative fluid balance.     PAST HISTORY  --------------------------------------------------------------------------------  No significant changes to PMH, PSH, FHx, SHx, unless otherwise noted    ALLERGIES & MEDICATIONS  --------------------------------------------------------------------------------  Allergies    No Known Allergies    Intolerances      Standing Inpatient Medications  argatroban Infusion 0.09 MICROgram(s)/kG/Min IV Continuous <Continuous>  artificial  tears Solution 1 Drop(s) Both EYES four times a day  atorvastatin 40 milliGRAM(s) Oral at bedtime  chlorhexidine 0.12% Liquid 15 milliLiter(s) Oral Mucosa every 12 hours  chlorhexidine 2% Cloths 1 Application(s) Topical <User Schedule>  CRRT Treatment    <Continuous>  dexMEDEtomidine Infusion 1.5 MICROgram(s)/kG/Hr IV Continuous <Continuous>  dextrose 50% Injectable 50 milliLiter(s) IV Push every 15 minutes  HYDROmorphone  Injectable 0.5 milliGRAM(s) IV Push every 6 hours  insulin regular Infusion 6 Unit(s)/Hr IV Continuous <Continuous>  norepinephrine Infusion 0.05 MICROgram(s)/kG/Min IV Continuous <Continuous>  pantoprazole  Injectable 40 milliGRAM(s) IV Push two times a day  polyethylene glycol 3350 17 Gram(s) Oral two times a day  PrismaSATE Dialysate BGK 4 / 2.5 5000 milliLiter(s) CRRT <Continuous>  PrismaSOL Filtration BGK 4 / 2.5 5000 milliLiter(s) CRRT <Continuous>  PrismaSOL Filtration BGK 4 / 2.5 5000 milliLiter(s) CRRT <Continuous>  propofol Infusion 17.94 MICROgram(s)/kG/Min IV Continuous <Continuous>  senna 2 Tablet(s) Oral at bedtime  sodium chloride 0.9%. 1000 milliLiter(s) IV Continuous <Continuous>    PRN Inpatient Medications  sodium chloride 0.9% lock flush 10 milliLiter(s) IV Push every 1 hour PRN      REVIEW OF SYSTEMS  --------------------------------------------------------------------------------  Unable to obtain    VITALS/PHYSICAL EXAM  --------------------------------------------------------------------------------  T(C): 36.4 (12-07-22 @ 08:00), Max: 36.9 (12-06-22 @ 16:00)  HR: 94 (12-07-22 @ 11:30) (80 - 184)  BP: --  RR: 14 (12-07-22 @ 11:30) (8 - 25)  SpO2: 100% (12-07-22 @ 11:30) (100% - 100%)  Wt(kg): --    12-06-22 @ 07:01  -  12-07-22 @ 07:00  --------------------------------------------------------  IN: 2213.6 mL / OUT: 2560 mL / NET: -346.4 mL    12-07-22 @ 07:01  -  12-07-22 @ 11:34  --------------------------------------------------------  IN: 349.6 mL / OUT: 522 mL / NET: -172.4 mL    Physical Exam:  	Gen: ill appearing male intubated   	HEENT: Anicteric  	Pulm: on CMV via ETT+  	CV: S1S2+  	Abd: Soft, +BS       	Ext: LE edema B/L++  	Neuro: sedated             : De Dios cath+ with concentrated urine in the bag  	Skin: Warm and dry  	Dialysis access: ECMO circuit    LABS/STUDIES  --------------------------------------------------------------------------------              8.3    12.78 >-----------<  159      [12-07-22 @ 01:33]              25.8     139  |  102  |  40  ----------------------------<  131      [12-07-22 @ 01:32]  3.6   |  23  |  1.91        Ca     8.8     [12-07-22 @ 01:32]      Mg     2.6     [12-07-22 @ 01:32]      Phos  4.1     [12-07-22 @ 01:32]    Creatinine Trend:  SCr 1.91 [12-07 @ 01:32]  SCr 2.28 [12-06 @ 15:25]  SCr 2.51 [12-06 @ 00:40]  SCr 4.19 [12-05 @ 00:50]  SCr 3.79 [12-04 @ 16:41]    Urinalysis - [12-03-22 @ 14:45]      Color BROWN / Appearance Slightly Turbid / SG 1.015 / pH 5.5      Gluc Negative / Ketone Negative  / Bili Negative / Urobili 6 mg/dL       Blood Moderate / Protein 30 mg/dL / Leuk Est Negative / Nitrite Negative      RBC 43 / WBC 2 / Hyaline 2 / Gran  / Sq Epi  / Non Sq Epi 1 / Bacteria Negative    Urine Osmolality 426      [12-03-22 @ 10:37] [facility] DIVISION OF KIDNEY DISEASES AND HYPERTENSION   FOLLOW UP NOTE    --------------------------------------------------------------------------------  HPI:  63 y/o male with PMH of CABG, DM, HTN, HLD presenting as transfer from [place] for c/f STEMI. Course c/b gallstone pancreatitis leading to ARDS and shock & cardiac arrest now on [facility]. Had significant troponin elevation and his LVEF down to 8%. Pt was deemed to be too unstable to have an angiogram and potential PCI due to his co-morbidities & a new subdural bleed. Pt being seen for ERIC. SCr on arrival was 2.15; trended down to hector 1.6 on 11/25 and eventually increased to 3.95 11/28. Pt received IV diuretics as per CTU.    Patient seen & examined today. Pt is intubated, unable to obtain ROS. Pt initiated on CRRT on 12/5/22 to optimize volume status and electrolytes. Pt tolerating CRRT overnight with target net negative fluid balance.     PAST HISTORY  --------------------------------------------------------------------------------  No significant changes to PMH, PSH, FHx, SHx, unless otherwise noted    ALLERGIES & MEDICATIONS  --------------------------------------------------------------------------------  Allergies    No Known Allergies    Intolerances      Standing Inpatient Medications  argatroban Infusion 0.09 MICROgram(s)/kG/Min IV Continuous <Continuous>  artificial  tears Solution 1 Drop(s) Both EYES four times a day  atorvastatin 40 milliGRAM(s) Oral at bedtime  chlorhexidine 0.12% Liquid 15 milliLiter(s) Oral Mucosa every 12 hours  chlorhexidine 2% Cloths 1 Application(s) Topical <User Schedule>  CRRT Treatment    <Continuous>  dexMEDEtomidine Infusion 1.5 MICROgram(s)/kG/Hr IV Continuous <Continuous>  dextrose 50% Injectable 50 milliLiter(s) IV Push every 15 minutes  HYDROmorphone  Injectable 0.5 milliGRAM(s) IV Push every 6 hours  insulin regular Infusion 6 Unit(s)/Hr IV Continuous <Continuous>  norepinephrine Infusion 0.05 MICROgram(s)/kG/Min IV Continuous <Continuous>  pantoprazole  Injectable 40 milliGRAM(s) IV Push two times a day  polyethylene glycol 3350 17 Gram(s) Oral two times a day  PrismaSATE Dialysate BGK 4 / 2.5 5000 milliLiter(s) CRRT <Continuous>  PrismaSOL Filtration BGK 4 / 2.5 5000 milliLiter(s) CRRT <Continuous>  PrismaSOL Filtration BGK 4 / 2.5 5000 milliLiter(s) CRRT <Continuous>  propofol Infusion 17.94 MICROgram(s)/kG/Min IV Continuous <Continuous>  senna 2 Tablet(s) Oral at bedtime  sodium chloride 0.9%. 1000 milliLiter(s) IV Continuous <Continuous>    PRN Inpatient Medications  sodium chloride 0.9% lock flush 10 milliLiter(s) IV Push every 1 hour PRN      REVIEW OF SYSTEMS  --------------------------------------------------------------------------------  Unable to obtain    VITALS/PHYSICAL EXAM  --------------------------------------------------------------------------------  T(C): 36.4 (12-07-22 @ 08:00), Max: 36.9 (12-06-22 @ 16:00)  HR: 94 (12-07-22 @ 11:30) (80 - 184)  BP: --  RR: 14 (12-07-22 @ 11:30) (8 - 25)  SpO2: 100% (12-07-22 @ 11:30) (100% - 100%)  Wt(kg): --    12-06-22 @ 07:01  -  12-07-22 @ 07:00  --------------------------------------------------------  IN: 2213.6 mL / OUT: 2560 mL / NET: -346.4 mL    12-07-22 @ 07:01  -  12-07-22 @ 11:34  --------------------------------------------------------  IN: 349.6 mL / OUT: 522 mL / NET: -172.4 mL    Physical Exam:  	Gen: ill appearing male intubated   	HEENT: Anicteric  	Pulm: on CMV via ETT+  	CV: S1S2+  	Abd: Soft, +BS       	Ext: LE edema B/L++  	Neuro: sedated             : De Dios cath+ with concentrated urine in the bag  	Skin: Warm and dry  	Dialysis access: ECMO circuit    LABS/STUDIES  --------------------------------------------------------------------------------              8.3    12.78 >-----------<  159      [12-07-22 @ 01:33]              25.8     139  |  102  |  40  ----------------------------<  131      [12-07-22 @ 01:32]  3.6   |  23  |  1.91        Ca     8.8     [12-07-22 @ 01:32]      Mg     2.6     [12-07-22 @ 01:32]      Phos  4.1     [12-07-22 @ 01:32]    Creatinine Trend:  SCr 1.91 [12-07 @ 01:32]  SCr 2.28 [12-06 @ 15:25]  SCr 2.51 [12-06 @ 00:40]  SCr 4.19 [12-05 @ 00:50]  SCr 3.79 [12-04 @ 16:41]    Urinalysis - [12-03-22 @ 14:45]      Color BROWN / Appearance Slightly Turbid / SG 1.015 / pH 5.5      Gluc Negative / Ketone Negative  / Bili Negative / Urobili 6 mg/dL       Blood Moderate / Protein 30 mg/dL / Leuk Est Negative / Nitrite Negative      RBC 43 / WBC 2 / Hyaline 2 / Gran  / Sq Epi  / Non Sq Epi 1 / Bacteria Negative    Urine Osmolality 426      [12-03-22 @ 10:37] [facility] DIVISION OF KIDNEY DISEASES AND HYPERTENSION   FOLLOW UP NOTE    --------------------------------------------------------------------------------  HPI:  61 y/o male with PMH of CABG, DM, HTN, HLD presenting as transfer from [place] for c/f STEMI. Course c/b gallstone pancreatitis leading to ARDS and shock & cardiac arrest now on [facility]. Had significant troponin elevation and his LVEF down to 8%. Pt was deemed to be too unstable to have an angiogram and potential PCI due to his co-morbidities & a new subdural bleed. Pt being seen for ERIC. SCr on arrival was 2.15; trended down to hector 1.6 on 11/25 and eventually increased to 3.95 11/28. Pt received IV diuretics as per CTU.    Patient seen & examined today. Pt is intubated, unable to obtain ROS. Pt initiated on CRRT on 12/5/22 to optimize volume status and electrolytes. Pt tolerating CRRT overnight with target net negative fluid balance.     PAST HISTORY  --------------------------------------------------------------------------------  No significant changes to PMH, PSH, FHx, SHx, unless otherwise noted    ALLERGIES & MEDICATIONS  --------------------------------------------------------------------------------  Allergies    No Known Allergies    Intolerances      Standing Inpatient Medications  argatroban Infusion 0.09 MICROgram(s)/kG/Min IV Continuous <Continuous>  artificial  tears Solution 1 Drop(s) Both EYES four times a day  atorvastatin 40 milliGRAM(s) Oral at bedtime  chlorhexidine 0.12% Liquid 15 milliLiter(s) Oral Mucosa every 12 hours  chlorhexidine 2% Cloths 1 Application(s) Topical <User Schedule>  CRRT Treatment    <Continuous>  dexMEDEtomidine Infusion 1.5 MICROgram(s)/kG/Hr IV Continuous <Continuous>  dextrose 50% Injectable 50 milliLiter(s) IV Push every 15 minutes  HYDROmorphone  Injectable 0.5 milliGRAM(s) IV Push every 6 hours  insulin regular Infusion 6 Unit(s)/Hr IV Continuous <Continuous>  norepinephrine Infusion 0.05 MICROgram(s)/kG/Min IV Continuous <Continuous>  pantoprazole  Injectable 40 milliGRAM(s) IV Push two times a day  polyethylene glycol 3350 17 Gram(s) Oral two times a day  PrismaSATE Dialysate BGK 4 / 2.5 5000 milliLiter(s) CRRT <Continuous>  PrismaSOL Filtration BGK 4 / 2.5 5000 milliLiter(s) CRRT <Continuous>  PrismaSOL Filtration BGK 4 / 2.5 5000 milliLiter(s) CRRT <Continuous>  propofol Infusion 17.94 MICROgram(s)/kG/Min IV Continuous <Continuous>  senna 2 Tablet(s) Oral at bedtime  sodium chloride 0.9%. 1000 milliLiter(s) IV Continuous <Continuous>    PRN Inpatient Medications  sodium chloride 0.9% lock flush 10 milliLiter(s) IV Push every 1 hour PRN      REVIEW OF SYSTEMS  --------------------------------------------------------------------------------  Unable to obtain    VITALS/PHYSICAL EXAM  --------------------------------------------------------------------------------  T(C): 36.4 (12-07-22 @ 08:00), Max: 36.9 (12-06-22 @ 16:00)  HR: 94 (12-07-22 @ 11:30) (80 - 184)  BP: --  RR: 14 (12-07-22 @ 11:30) (8 - 25)  SpO2: 100% (12-07-22 @ 11:30) (100% - 100%)  Wt(kg): --    12-06-22 @ 07:01  -  12-07-22 @ 07:00  --------------------------------------------------------  IN: 2213.6 mL / OUT: 2560 mL / NET: -346.4 mL    12-07-22 @ 07:01  -  12-07-22 @ 11:34  --------------------------------------------------------  IN: 349.6 mL / OUT: 522 mL / NET: -172.4 mL    Physical Exam:  	Gen: ill appearing male intubated   	HEENT: Anicteric  	Pulm: on CMV via ETT+  	CV: S1S2+  	Abd: Soft, +BS       	Ext: LE edema B/L++  	Neuro: sedated             : De Dios cath+ with concentrated urine in the bag  	Skin: Warm and dry  	Dialysis access: ECMO circuit    LABS/STUDIES  --------------------------------------------------------------------------------              8.3    12.78 >-----------<  159      [12-07-22 @ 01:33]              25.8     139  |  102  |  40  ----------------------------<  131      [12-07-22 @ 01:32]  3.6   |  23  |  1.91        Ca     8.8     [12-07-22 @ 01:32]      Mg     2.6     [12-07-22 @ 01:32]      Phos  4.1     [12-07-22 @ 01:32]    Creatinine Trend:  SCr 1.91 [12-07 @ 01:32]  SCr 2.28 [12-06 @ 15:25]  SCr 2.51 [12-06 @ 00:40]  SCr 4.19 [12-05 @ 00:50]  SCr 3.79 [12-04 @ 16:41]    Urinalysis - [12-03-22 @ 14:45]      Color BROWN / Appearance Slightly Turbid / SG 1.015 / pH 5.5      Gluc Negative / Ketone Negative  / Bili Negative / Urobili 6 mg/dL       Blood Moderate / Protein 30 mg/dL / Leuk Est Negative / Nitrite Negative      RBC 43 / WBC 2 / Hyaline 2 / Gran  / Sq Epi  / Non Sq Epi 1 / Bacteria Negative    Urine Osmolality 426      [12-03-22 @ 10:37]

## 2022-12-07 NOTE — PROGRESS NOTE ADULT - ASSESSMENT
61 YO M with a history of CAD s/p 4v CABG ~2019 in [facility], DM2 (A1c 7.3%), and HTN who initially presented to OSH with abdominal pain and n/v and found to have pancreatitis with lipase > 3000 though also with elevated troponins. CT abdomen revealed acute pancreatitis and his initial LVEF was 40-45%. He developed MSOF with hypoxic respiratory failure requiring intubation and ERIC and went into hypoxic PEA arrest requiring ACLS and due to persistent hypoxia and hypotension on pressors after ROSC was cannulated to [facility] ECMO (LFV, RFA, no anterograde perfusion catheter) on 11/25. Notably CTH that day revealed small subdural hemorrhage.     His post arrest TTE on 11/26 showed a signficantly worse LVEF of 5-10% with an AV that was only minimally opening. Since then he has had improvement in his LV function (now ~35-40%) and improved pulsatility while tolerating progressive slow ECMO weans. ECMO turndown (note in chart 11/30) was reassuring for ability to decannulate to IABP/inotropes. [facility] 12/06 showed closure of his 3 vein grafts with graft to LAD patent though with distal native disease. IABP placed, planning for ECMO weaning. His ARDS is improving. Currently on CVVH.    *** INCOMPLETE NOTE *** INCOMPLETE NOTE *** INCOMPLETE NOTE *** INCOMPLETE NOTE *** INCOMPLETE NOTE *** INCOMPLETE NOTE *** INCOMPLETE NOTE *** INCOMPLETE NOTE *** INCOMPLETE NOTE *** INCOMPLETE NOTE *** INCOMPLETE NOTE *** INCOMPLETE NOTE *** INCOMPLETE NOTE *** INCOMPLETE NOTE *** INCOMPLETE NOTE *** INCOMPLETE NOTE *** INCOMPLETE NOTE *** INCOMPLETE NOTE *** INCOMPLETE NOTE *** INCOMPLETE NOTE ***  RECS BELOW ARE NOT TO BE FOLLOWED UNTIL FINALIZED  atorva 40  argat gtt, insulin gtt, NS 5/hr  CRRT  dexmed gtt, prop gtt  norepi gt    Problem/Plan - 1:  ·  Problem: Non-ST elevation MI (NSTEMI).   ·  Plan:   - troponin peaked at > 70658   - [facility] today with IABP placement today, revealed that 3 grafts are closed w/ distal native disease from remaining LAD graft  - continue statin  - would start ASA when okay with surgical team.    Problem/Plan - 2:  ·  Problem: Gall stone pancreatitis.   ·  Plan:   - CT abd showing acute pancreatitis  - RUQ showing sludge, no stones  - lipase > 3000 11/24, normalized prior and now fluctuating, today improved to 737   - conservative care  - appreciate GI recs, no intervention planned   - surgery following   - on empiric meropenem  - had a low grade fever despite abx, f/u infectious workup.    Problem/Plan - 3:  ·  Problem: ERIC (acute kidney injury).   ·  Plan:   - likely arrest associated ATN and now worsened from hypovolemia   - stop diuretics and aim to keep net even to positive   - renal following, c/t CVVH.    Problem/Plan - 4:  ·  Problem: Cardiogenic shock.   ·  Plan:   - continue VA ECMO (LFV/RFA, no anterograde perfusion). monitor distal pulses closely  - ECMO wean performed on 11/30 and favorable, now that IABP in place would continue ECMO wean   - remains pulsatile  - cont argatroban for AC while on ECMO, norepi gtt titrated as needed given sedation/shock.    Problem/Plan - 5:  ·  Problem: Nontraumatic subdural hematoma.   ·  Plan:   - small 3mm subdural hemorrhage stable on repeat CTs  - okay to continue argatroban, appreciate neuro recs  - keep MAP < 75 mmHg and careful monitoring of PTT  - repeat CT head today to assess for interval changes.    Problem/Plan - 6:  ·  Problem: Acute respiratory failure with hypoxia.   ·  Plan:   - CXR improved with more lung volumes after bronch and increased PEEP  - bronch showed erythematous airways with scant bleeding  - extubate when able  - continue checking right radial ABG.  - continue treatment for pancreatitis related ARDS  - caution with propofol gtt given pancreatis, would follow lipids.    Started on dialysis. Able to move all extremities. On exam, NAD, JVP approx 6 cm w/ HJR, RRR, CTA anterior lung fields, abdomen distended with minimal bowel sounds, no edema, pulses intact. Labs reviewed - Na 143 (improved), BUN/Cr 61/2.51 (on HD), albumin 3.0, amylase/lipase stably elevated (improved from prior). LHC today revealed patent LIMA to LAD,  of RCA/LCx with L to L and L to R collaterals; aortogram revealed no filling of any other grafts.   - c/w CVVH; goal net negative  - will attempt weaning of ECMO now that IABP is in  - c/w anticoagulation  - on statin  - prognosis guarded; family meeting held 12/6 and discussed at length current state and next steps with plan to f/u next week .    63 YO M with a history of CAD s/p 4v CABG ~2019 in [facility], DM2 (A1c 7.3%), and HTN who initially presented to OSH with abdominal pain and n/v and found to have pancreatitis with lipase > 3000 though also with elevated troponins. CT abdomen revealed acute pancreatitis and his initial LVEF was 40-45%. He developed MSOF with hypoxic respiratory failure requiring intubation and ERIC and went into hypoxic PEA arrest requiring ACLS and due to persistent hypoxia and hypotension on pressors after ROSC was cannulated to VA ECMO (LFV, RFA, no anterograde perfusion catheter) on 11/25. Notably CTH that day revealed small subdural hemorrhage.     His post arrest TTE on 11/26 showed a signficantly worse LVEF of 5-10% with an AV that was only minimally opening. Since then he has had improvement in his LV function (now ~35-40%) and improved pulsatility while tolerating progressive slow ECMO weans. ECMO turndown (note in chart 11/30) was reassuring for ability to decannulate to IABP/inotropes. [facility] 12/06 showed closure of his 3 vein grafts with graft to LAD patent though with distal native disease. IABP placed, planning for ECMO weaning. His ARDS is improving. Currently on CVVH.    *** INCOMPLETE NOTE *** INCOMPLETE NOTE *** INCOMPLETE NOTE *** INCOMPLETE NOTE *** INCOMPLETE NOTE *** INCOMPLETE NOTE *** INCOMPLETE NOTE *** INCOMPLETE NOTE *** INCOMPLETE NOTE *** INCOMPLETE NOTE *** INCOMPLETE NOTE *** INCOMPLETE NOTE *** INCOMPLETE NOTE *** INCOMPLETE NOTE *** INCOMPLETE NOTE *** INCOMPLETE NOTE *** INCOMPLETE NOTE *** INCOMPLETE NOTE *** INCOMPLETE NOTE *** INCOMPLETE NOTE ***  RECS BELOW ARE NOT TO BE FOLLOWED UNTIL FINALIZED  atorva 40  argat gtt, insulin gtt, NS 5/hr  CRRT  dexmed gtt, prop gtt  norepi gt    Problem/Plan - 1:  ·  Problem: Non-ST elevation MI (NSTEMI).   ·  Plan:   - troponin peaked at > 98789   - [facility] today with IABP placement today, revealed that 3 grafts are closed w/ distal native disease from remaining LAD graft  - continue statin  - would start ASA when okay with surgical team.    Problem/Plan - 2:  ·  Problem: Gall stone pancreatitis.   ·  Plan:   - CT abd showing acute pancreatitis  - RUQ showing sludge, no stones  - lipase > 3000 11/24, normalized prior and now fluctuating, today improved to 737   - conservative care  - appreciate GI recs, no intervention planned   - surgery following   - on empiric meropenem  - had a low grade fever despite abx, f/u infectious workup.    Problem/Plan - 3:  ·  Problem: ERIC (acute kidney injury).   ·  Plan:   - likely arrest associated ATN and now worsened from hypovolemia   - stop diuretics and aim to keep net even to positive   - renal following, c/t CVVH.    Problem/Plan - 4:  ·  Problem: Cardiogenic shock.   ·  Plan:   - continue VA ECMO (LFV/RFA, no anterograde perfusion). monitor distal pulses closely  - ECMO wean performed on 11/30 and favorable, now that IABP in place would continue ECMO wean   - remains pulsatile  - cont argatroban for AC while on ECMO, norepi gtt titrated as needed given sedation/shock.    Problem/Plan - 5:  ·  Problem: Nontraumatic subdural hematoma.   ·  Plan:   - small 3mm subdural hemorrhage stable on repeat CTs  - okay to continue argatroban, appreciate neuro recs  - keep MAP < 75 mmHg and careful monitoring of PTT  - repeat CT head today to assess for interval changes.    Problem/Plan - 6:  ·  Problem: Acute respiratory failure with hypoxia.   ·  Plan:   - CXR improved with more lung volumes after bronch and increased PEEP  - bronch showed erythematous airways with scant bleeding  - extubate when able  - continue checking right radial ABG.  - continue treatment for pancreatitis related ARDS  - caution with propofol gtt given pancreatis, would follow lipids.    Started on dialysis. Able to move all extremities. On exam, NAD, JVP approx 6 cm w/ HJR, RRR, CTA anterior lung fields, abdomen distended with minimal bowel sounds, no edema, pulses intact. Labs reviewed - Na 143 (improved), BUN/Cr 61/2.51 (on HD), albumin 3.0, amylase/lipase stably elevated (improved from prior). LHC today revealed patent LIMA to LAD,  of RCA/LCx with L to L and L to R collaterals; aortogram revealed no filling of any other grafts.   - c/w CVVH; goal net negative  - will attempt weaning of ECMO now that IABP is in  - c/w anticoagulation  - on statin  - prognosis guarded; family meeting held 12/6 and discussed at length current state and next steps with plan to f/u next week .    61 YO M with a history of CAD s/p 4v CABG ~2019 in [facility], DM2 (A1c 7.3%), and HTN who initially presented to OSH with abdominal pain and n/v and found to have pancreatitis with lipase > 3000 though also with elevated troponins. CT abdomen revealed acute pancreatitis and his initial LVEF was 40-45%. He developed MSOF with hypoxic respiratory failure requiring intubation and ERIC and went into hypoxic PEA arrest requiring ACLS and due to persistent hypoxia and hypotension on pressors after ROSC was cannulated to VA ECMO (LFV, RFA, no anterograde perfusion catheter) on 11/25. Notably CTH that day revealed small subdural hemorrhage.     His post arrest TTE on 11/26 showed a signficantly worse LVEF of 5-10% with an AV that was only minimally opening. Since then he has had improvement in his LV function (now ~35-40%) and improved pulsatility while tolerating progressive slow ECMO weans. ECMO turndown (note in chart 11/30) was reassuring for ability to decannulate to IABP/inotropes. [facility] 12/06 showed closure of his 3 vein grafts with graft to LAD patent though with distal native disease. IABP placed, planning for ECMO weaning. His ARDS is improving. Currently on CVVH.    *** INCOMPLETE NOTE *** INCOMPLETE NOTE *** INCOMPLETE NOTE *** INCOMPLETE NOTE *** INCOMPLETE NOTE *** INCOMPLETE NOTE *** INCOMPLETE NOTE *** INCOMPLETE NOTE *** INCOMPLETE NOTE *** INCOMPLETE NOTE *** INCOMPLETE NOTE *** INCOMPLETE NOTE *** INCOMPLETE NOTE *** INCOMPLETE NOTE *** INCOMPLETE NOTE *** INCOMPLETE NOTE *** INCOMPLETE NOTE *** INCOMPLETE NOTE *** INCOMPLETE NOTE *** INCOMPLETE NOTE ***  RECS BELOW ARE NOT TO BE FOLLOWED UNTIL FINALIZED  atorva 40  argat gtt, insulin gtt, NS 5/hr  CRRT  dexmed gtt, prop gtt  norepi gt    Problem/Plan - 1:  ·  Problem: Non-ST elevation MI (NSTEMI).   ·  Plan:   - troponin peaked at > 27835   - [facility] today with IABP placement today, revealed that 3 grafts are closed w/ distal native disease from remaining LAD graft  - continue statin  - would start ASA when okay with surgical team.    Problem/Plan - 2:  ·  Problem: Gall stone pancreatitis.   ·  Plan:   - CT abd showing acute pancreatitis  - RUQ showing sludge, no stones  - lipase > 3000 11/24, normalized prior and now fluctuating, today improved to 737   - conservative care  - appreciate GI recs, no intervention planned   - surgery following   - on empiric meropenem  - had a low grade fever despite abx, f/u infectious workup.    Problem/Plan - 3:  ·  Problem: ERIC (acute kidney injury).   ·  Plan:   - likely arrest associated ATN and now worsened from hypovolemia   - stop diuretics and aim to keep net even to positive   - renal following, c/t CVVH.    Problem/Plan - 4:  ·  Problem: Cardiogenic shock.   ·  Plan:   - continue VA ECMO (LFV/RFA, no anterograde perfusion). monitor distal pulses closely  - ECMO wean performed on 11/30 and favorable, now that IABP in place would continue ECMO wean   - remains pulsatile  - cont argatroban for AC while on ECMO, norepi gtt titrated as needed given sedation/shock.    Problem/Plan - 5:  ·  Problem: Nontraumatic subdural hematoma.   ·  Plan:   - small 3mm subdural hemorrhage stable on repeat CTs  - okay to continue argatroban, appreciate neuro recs  - keep MAP < 75 mmHg and careful monitoring of PTT  - repeat CT head today to assess for interval changes.    Problem/Plan - 6:  ·  Problem: Acute respiratory failure with hypoxia.   ·  Plan:   - CXR improved with more lung volumes after bronch and increased PEEP  - bronch showed erythematous airways with scant bleeding  - extubate when able  - continue checking right radial ABG.  - continue treatment for pancreatitis related ARDS  - caution with propofol gtt given pancreatis, would follow lipids.    Started on dialysis. Able to move all extremities. On exam, NAD, JVP approx 6 cm w/ HJR, RRR, CTA anterior lung fields, abdomen distended with minimal bowel sounds, no edema, pulses intact. Labs reviewed - Na 143 (improved), BUN/Cr 61/2.51 (on HD), albumin 3.0, amylase/lipase stably elevated (improved from prior). LHC today revealed patent LIMA to LAD,  of RCA/LCx with L to L and L to R collaterals; aortogram revealed no filling of any other grafts.   - c/w CVVH; goal net negative  - will attempt weaning of ECMO now that IABP is in  - c/w anticoagulation  - on statin  - prognosis guarded; family meeting held 12/6 and discussed at length current state and next steps with plan to f/u next week .    63 YO M with a history of CAD s/p 4v CABG ~2019 in [facility], DM2 (A1c 7.3%), and HTN who initially presented to OSH with abdominal pain and n/v and found to have pancreatitis with lipase > 3000 though also with elevated troponins. CT abdomen revealed acute pancreatitis and his initial LVEF was 40-45%. He developed MSOF with hypoxic respiratory failure requiring intubation and ERIC and went into hypoxic PEA arrest requiring ACLS and due to persistent hypoxia and hypotension on pressors after ROSC was cannulated to VA ECMO (LFV, RFA, no anterograde perfusion catheter) on 11/25. Notably CTH that day revealed small subdural hemorrhage.     His post arrest TTE on 11/26 showed a signficantly worse LVEF of 5-10% with an AV that was only minimally opening. Since then he has had improvement in his LV function (now ~35-40%) and improved pulsatility while tolerating progressive slow ECMO weans. ECMO turndown (note in chart 11/30) was reassuring for ability to decannulate to IABP/inotropes. [facility] 12/06 showed closure of his 3 vein grafts with graft to LAD patent though with distal native disease. IABP placed, planning for ECMO weaning. His ARDS is improving. Currently on CVVH.        argat gtt, insulin gtt, NS 5/hr  CRRT  dexmed gtt, prop gtt  norepi gt    Problem/Plan - 1:  ·  Problem: Non-ST elevation MI (NSTEMI).   ·  Plan:   - troponin peaked at > 50707   - [facility] 12/06 with IABP placement revealed that 3 grafts are closed w/ distal native disease from remaining LAD graft  - continue statin  - would start ASA when okay with surgical team.    Problem/Plan - 2:  ·  Problem: Gall stone pancreatitis.   ·  Plan:   - CT abd showing acute pancreatitis  - RUQ showing sludge, no stones  - lipase > 3000 11/24, normalized prior and now fluctuating, today improved  - conservative care  - appreciate GI recs, no intervention planned   - surgery following   - on empiric meropenem  - had a low grade fever despite abx, f/u infectious workup.    Problem/Plan - 3:  ·  Problem: ERIC (acute kidney injury).   ·  Plan:   - likely arrest associated ATN and now worsened from hypovolemia   - stop diuretics and aim to keep net even to positive   - renal following, c/t CVVH.    Problem/Plan - 4:  ·  Problem: Cardiogenic shock.   ·  Plan:   - continue VA ECMO (LFV/RFA, no anterograde perfusion). monitor distal pulses closely  - ECMO wean performed on 11/30 and favorable, now that IABP in place will re-attempt ECMO wean this evening  - remains pulsatile  - cont argatroban for AC while on ECMO, would titrate pressors down to target MAP     Problem/Plan - 5:  ·  Problem: Nontraumatic subdural hematoma.   ·  Plan:   - small 3mm subdural hemorrhage stable on repeat CTs  - okay to continue argatroban, appreciate neuro recs  - keep MAP < 75 mmHg and careful monitoring of PTT  - repeat CT head today to assess for interval changes.    Problem/Plan - 6:  ·  Problem: Acute respiratory failure with hypoxia.   ·  Plan:   - CXR improved with more lung volumes after bronch and increased PEEP  - bronch showed erythematous airways with scant bleeding  - extubate when able  - continue checking right radial ABG.  - continue treatment for pancreatitis related ARDS  - caution with propofol gtt given pancreatis, would follow lipids.    Started on dialysis. Able to move all extremities. On exam, NAD, JVP approx 6 cm w/ HJR, RRR, CTA anterior lung fields, abdomen distended with minimal bowel sounds, no edema, pulses intact. Labs reviewed - Na 143 (improved), BUN/Cr 61/2.51 (on HD), albumin 3.0, amylase/lipase stably elevated (improved from prior). LHC today revealed patent LIMA to LAD,  of RCA/LCx with L to L and L to R collaterals; aortogram revealed no filling of any other grafts.   - c/w CVVH; goal net negative  - will attempt weaning of ECMO now that IABP is in  - c/w anticoagulation  - on statin  - prognosis guarded; family meeting held 12/6 and discussed at length current state and next steps with plan to f/u next week .    61 YO M with a history of CAD s/p 4v CABG ~2019 in [facility], DM2 (A1c 7.3%), and HTN who initially presented to OSH with abdominal pain and n/v and found to have pancreatitis with lipase > 3000 though also with elevated troponins. CT abdomen revealed acute pancreatitis and his initial LVEF was 40-45%. He developed MSOF with hypoxic respiratory failure requiring intubation and ERIC and went into hypoxic PEA arrest requiring ACLS and due to persistent hypoxia and hypotension on pressors after ROSC was cannulated to VA ECMO (LFV, RFA, no anterograde perfusion catheter) on 11/25. Notably CTH that day revealed small subdural hemorrhage.     His post arrest TTE on 11/26 showed a signficantly worse LVEF of 5-10% with an AV that was only minimally opening. Since then he has had improvement in his LV function (now ~35-40%) and improved pulsatility while tolerating progressive slow ECMO weans. ECMO turndown (note in chart 11/30) was reassuring for ability to decannulate to IABP/inotropes. C 12/06 showed closure of his 3 vein grafts with graft to LAD patent though with distal native disease. IABP placed, planning for ECMO weaning. His ARDS is improving. Currently on CVVH.        argat gtt, insulin gtt, NS 5/hr  CRRT  dexmed gtt, prop gtt  norepi gt    Problem/Plan - 1:  ·  Problem: Non-ST elevation MI (NSTEMI).   ·  Plan:   - troponin peaked at > 89485   - [facility] 12/06 with IABP placement revealed that 3 grafts are closed w/ distal native disease from remaining LAD graft  - continue statin  - would start ASA when okay with surgical team.    Problem/Plan - 2:  ·  Problem: Gall stone pancreatitis.   ·  Plan:   - CT abd showing acute pancreatitis  - RUQ showing sludge, no stones  - lipase > 3000 11/24, normalized prior and now fluctuating, today improved  - conservative care  - appreciate GI recs, no intervention planned   - surgery following   - on empiric meropenem  - had a low grade fever despite abx, f/u infectious workup.    Problem/Plan - 3:  ·  Problem: ERIC (acute kidney injury).   ·  Plan:   - likely arrest associated ATN and now worsened from hypovolemia   - stop diuretics and aim to keep net even to positive   - renal following, c/t CVVH.    Problem/Plan - 4:  ·  Problem: Cardiogenic shock.   ·  Plan:   - continue VA ECMO (LFV/RFA, no anterograde perfusion). monitor distal pulses closely  - ECMO wean performed on 11/30 and favorable, now that IABP in place will re-attempt ECMO wean this evening  - remains pulsatile  - cont argatroban for AC while on ECMO, would titrate pressors down to target MAP     Problem/Plan - 5:  ·  Problem: Nontraumatic subdural hematoma.   ·  Plan:   - small 3mm subdural hemorrhage stable on repeat CTs  - okay to continue argatroban, appreciate neuro recs  - keep MAP < 75 mmHg and careful monitoring of PTT  - repeat CT head today to assess for interval changes.    Problem/Plan - 6:  ·  Problem: Acute respiratory failure with hypoxia.   ·  Plan:   - CXR improved with more lung volumes after bronch and increased PEEP  - bronch showed erythematous airways with scant bleeding  - extubate when able  - continue checking right radial ABG.  - continue treatment for pancreatitis related ARDS  - caution with propofol gtt given pancreatis, would follow lipids.    Started on dialysis. Able to move all extremities. On exam, NAD, JVP approx 6 cm w/ HJR, RRR, CTA anterior lung fields, abdomen distended with minimal bowel sounds, no edema, pulses intact. Labs reviewed - Na 143 (improved), BUN/Cr 61/2.51 (on HD), albumin 3.0, amylase/lipase stably elevated (improved from prior). LHC today revealed patent LIMA to LAD,  of RCA/LCx with L to L and L to R collaterals; aortogram revealed no filling of any other grafts.   - c/w CVVH; goal net negative  - will attempt weaning of ECMO now that IABP is in  - c/w anticoagulation  - on statin  - prognosis guarded; family meeting held 12/6 and discussed at length current state and next steps with plan to f/u next week .    61 YO M with a history of CAD s/p 4v CABG ~2019 in [facility], DM2 (A1c 7.3%), and HTN who initially presented to OSH with abdominal pain and n/v and found to have pancreatitis with lipase > 3000 though also with elevated troponins. CT abdomen revealed acute pancreatitis and his initial LVEF was 40-45%. He developed MSOF with hypoxic respiratory failure requiring intubation and ERIC and went into hypoxic PEA arrest requiring ACLS and due to persistent hypoxia and hypotension on pressors after ROSC was cannulated to VA ECMO (LFV, RFA, no anterograde perfusion catheter) on 11/25. Notably CTH that day revealed small subdural hemorrhage.     His post arrest TTE on 11/26 showed a signficantly worse LVEF of 5-10% with an AV that was only minimally opening. Since then he has had improvement in his LV function (now ~35-40%) and improved pulsatility while tolerating progressive slow ECMO weans. ECMO turndown (note in chart 11/30) was reassuring for ability to decannulate to IABP/inotropes. C 12/06 showed closure of his 3 vein grafts with graft to LAD patent though with distal native disease. IABP placed, planning for ECMO weaning. His ARDS is improving. Currently on CVVH.        argat gtt, insulin gtt, NS 5/hr  CRRT  dexmed gtt, prop gtt  norepi gt    Problem/Plan - 1:  ·  Problem: Non-ST elevation MI (NSTEMI).   ·  Plan:   - troponin peaked at > 23632   - C 12/06 with IABP placement revealed that 3 grafts are closed w/ distal native disease from remaining LAD graft  - continue statin  - would start ASA when okay with surgical team.    Problem/Plan - 2:  ·  Problem: Gall stone pancreatitis.   ·  Plan:   - CT abd showing acute pancreatitis  - RUQ showing sludge, no stones  - lipase > 3000 11/24, normalized prior and now fluctuating, today improved  - conservative care  - appreciate GI recs, no intervention planned   - surgery following   - on empiric meropenem  - had a low grade fever despite abx, f/u infectious workup.    Problem/Plan - 3:  ·  Problem: ERIC (acute kidney injury).   ·  Plan:   - likely arrest associated ATN and now worsened from hypovolemia   - stop diuretics and aim to keep net even to positive   - renal following, c/t CVVH.    Problem/Plan - 4:  ·  Problem: Cardiogenic shock.   ·  Plan:   - continue VA ECMO (LFV/RFA, no anterograde perfusion). monitor distal pulses closely  - ECMO wean performed on 11/30 and favorable, now that IABP in place will re-attempt ECMO wean this evening  - remains pulsatile  - cont argatroban for AC while on ECMO, would titrate pressors down to target MAP     Problem/Plan - 5:  ·  Problem: Nontraumatic subdural hematoma.   ·  Plan:   - small 3mm subdural hemorrhage stable on repeat CTs  - okay to continue argatroban, appreciate neuro recs  - keep MAP < 75 mmHg and careful monitoring of PTT  - repeat CT head today to assess for interval changes.    Problem/Plan - 6:  ·  Problem: Acute respiratory failure with hypoxia.   ·  Plan:   - CXR improved with more lung volumes after bronch and increased PEEP  - bronch showed erythematous airways with scant bleeding  - extubate when able  - continue checking right radial ABG.  - continue treatment for pancreatitis related ARDS  - caution with propofol gtt given pancreatis, would follow lipids.    Started on dialysis. Able to move all extremities. On exam, NAD, JVP approx 6 cm w/ HJR, RRR, CTA anterior lung fields, abdomen distended with minimal bowel sounds, no edema, pulses intact. Labs reviewed - Na 143 (improved), BUN/Cr 61/2.51 (on HD), albumin 3.0, amylase/lipase stably elevated (improved from prior). LHC today revealed patent LIMA to LAD,  of RCA/LCx with L to L and L to R collaterals; aortogram revealed no filling of any other grafts.   - c/w CVVH; goal net negative  - will attempt weaning of ECMO now that IABP is in  - c/w anticoagulation  - on statin  - prognosis guarded; family meeting held 12/6 and discussed at length current state and next steps with plan to f/u next week .    61 YO M with a history of CAD s/p 4v CABG ~2019 in [facility], DM2 (A1c 7.3%), and HTN who initially presented to OSH with abdominal pain and n/v and found to have pancreatitis with lipase > 3000 though also with elevated troponins. CT abdomen revealed acute pancreatitis and his initial LVEF was 40-45%. He developed MSOF with hypoxic respiratory failure requiring intubation and ERIC and went into hypoxic PEA arrest requiring ACLS and due to persistent hypoxia and hypotension on pressors after ROSC was cannulated to [facility] (LFV, RFA, no anterograde perfusion catheter) on 11/25. Notably CTH that day revealed small subdural hemorrhage.     His post arrest TTE on 11/26 showed a signficantly worse LVEF of 5-10% with an AV that was only minimally opening. Since then he has had improvement in his LV function (now ~35-40%) and improved pulsatility while tolerating progressive slow ECMO weans. ECMO turndown (note in chart 11/30) was reassuring for ability to decannulate to IABP/inotropes. C 12/06 showed closure of his 3 vein grafts with graft to LAD patent though with distal native disease. IABP placed, planning for ECMO weaning. His ARDS is improving. Currently on CVVH.    Previous cardiac studies:  LHC (12/06/22) - patent LIMA-LAD, RCA , LCx , aortogram w/ closure of 3 vein grafts, LVEDP 22 mm Hg, left-to-left and left-to-right collaterals  TTE (12/03/22) - mod-to-sev segmental LVSD (inferolateral, inferior, inferoseptal hypokinesis)    Hemodynamics:  12/07 IABP 1:1 ECMO 3600 RPM 4.0 LPM, aMAP 90, aDBP 123, mVO2 66.8%, CVP 7, CO 6.8, CI 3.3 61 YO M with a history of CAD s/p 4v CABG ~2019 in [facility], DM2 (A1c 7.3%), and HTN who initially presented to OSH with abdominal pain and n/v and found to have pancreatitis with lipase > 3000 though also with elevated troponins. CT abdomen revealed acute pancreatitis and his initial LVEF was 40-45%. He developed MSOF with hypoxic respiratory failure requiring intubation and ERIC and went into hypoxic PEA arrest requiring ACLS and due to persistent hypoxia and hypotension on pressors after ROSC was cannulated to [facility] ECMO (LFV, RFA, no anterograde perfusion catheter) on 11/25. Notably CTH that day revealed small subdural hemorrhage.     His post arrest TTE on 11/26 showed a signficantly worse LVEF of 5-10% with an AV that was only minimally opening. Since then he has had improvement in his LV function (now ~35-40%) and improved pulsatility while tolerating progressive slow ECMO weans. ECMO turndown (note in chart 11/30) was reassuring for ability to decannulate to IABP/inotropes. C 12/06 showed closure of his 3 vein grafts with graft to LAD patent though with distal native disease. IABP placed, planning for ECMO weaning. His ARDS is improving. Currently on CVVH.    Previous cardiac studies:  LHC (12/06/22) - patent LIMA-LAD, RCA , LCx , aortogram w/ closure of 3 vein grafts, LVEDP 22 mm Hg, left-to-left and left-to-right collaterals  TTE (12/03/22) - mod-to-sev segmental LVSD (inferolateral, inferior, inferoseptal hypokinesis)    Hemodynamics:  12/07 IABP 1:1 ECMO 3600 RPM 4.0 LPM, aMAP 90, aDBP 123, mVO2 66.8%, CVP 7, CO 6.8, CI 3.3 63 YO M with a history of CAD s/p 4v CABG ~2019 in [facility], DM2 (A1c 7.3%), and HTN who initially presented to OSH with abdominal pain and n/v and found to have pancreatitis with lipase > 3000 though also with elevated troponins. CT abdomen revealed acute pancreatitis and his initial LVEF was 40-45%. He developed MSOF with hypoxic respiratory failure requiring intubation and ERIC and went into hypoxic PEA arrest requiring ACLS and due to persistent hypoxia and hypotension on pressors after ROSC was cannulated to [facility] ECMO (LFV, RFA, no anterograde perfusion catheter) on 11/25. Notably CTH that day revealed small subdural hemorrhage.     His post arrest TTE on 11/26 showed a signficantly worse LVEF of 5-10% with an AV that was only minimally opening. Since then he has had improvement in his LV function (now ~35-40%) and improved pulsatility while tolerating progressive slow ECMO weans. ECMO turndown (note in chart 11/30) was reassuring for ability to decannulate to IABP/inotropes. C 12/06 showed closure of his 3 vein grafts with graft to LAD patent though with distal native disease. IABP placed, planning for ECMO weaning. His ARDS is improving. Currently on CVVH.    Previous cardiac studies:  LHC (12/06/22) - patent LIMA-LAD, RCA , LCx , aortogram w/ closure of 3 vein grafts, LVEDP 22 mm Hg, left-to-left and left-to-right collaterals  TTE (12/03/22) - mod-to-sev segmental LVSD (inferolateral, inferior, inferoseptal hypokinesis)    Hemodynamics:  12/07 IABP 1:1 ECMO 3600 RPM 4.0 LPM, aMAP 90, aDBP 123, mVO2 66.8%, CVP 7, CO 6.8, CI 3.3

## 2022-12-07 NOTE — PROGRESS NOTE ADULT - PROBLEM SELECTOR PLAN 3
- likely arrest associated ATN and now worsened from hypovolemia   - stop diuretics and aim to keep net even to positive   - renal following, c/t CVVH.

## 2022-12-07 NOTE — PROGRESS NOTE ADULT - PROBLEM SELECTOR PLAN 2
- CT abd showing acute pancreatitis  - RUQ showing sludge, no stones  - lipase > 3000 11/24, normalized prior and now fluctuating, today improved  - conservative care  - appreciate GI recs, no intervention planned   - surgery following   - on empiric meropenem  - had a low grade fever despite abx, f/u infectious workup. - CT abd showing acute pancreatitis  - RUQ showing sludge, no stones  - lipase > 3000 11/24, normalized prior and now fluctuating, today improved  - conservative care  - appreciate GI recs, no intervention planned   - surgery following   - off of aBx, appreciate ID recs

## 2022-12-07 NOTE — PROGRESS NOTE ADULT - SUBJECTIVE AND OBJECTIVE BOX
CRITICAL CARE ATTENDING - CTICU    MEDICATIONS  (STANDING):  argatroban Infusion 0.075 MICROgram(s)/kG/Min (0.42 mL/Hr) IV Continuous <Continuous>  artificial  tears Solution 1 Drop(s) Both EYES four times a day  atorvastatin 40 milliGRAM(s) Oral at bedtime  chlorhexidine 0.12% Liquid 15 milliLiter(s) Oral Mucosa every 12 hours  chlorhexidine 2% Cloths 1 Application(s) Topical <User Schedule>  CRRT Treatment    <Continuous>  dexMEDEtomidine Infusion 1.5 MICROgram(s)/kG/Hr (34.8 mL/Hr) IV Continuous <Continuous>  dextrose 50% Injectable 50 milliLiter(s) IV Push every 15 minutes  HYDROmorphone  Injectable 0.5 milliGRAM(s) IV Push every 6 hours  insulin regular Infusion 6 Unit(s)/Hr (6 mL/Hr) IV Continuous <Continuous>  norepinephrine Infusion 0.05 MICROgram(s)/kG/Min (8.71 mL/Hr) IV Continuous <Continuous>  pantoprazole  Injectable 40 milliGRAM(s) IV Push two times a day  polyethylene glycol 3350 17 Gram(s) Oral two times a day  PrismaSATE Dialysate BK 0 / 3.5 5000 milliLiter(s) (2000 mL/Hr) CRRT <Continuous>  PrismaSOL Filtration BGK 4 / 2.5 5000 milliLiter(s) (800 mL/Hr) CRRT <Continuous>  PrismaSOL Filtration BGK 4 / 2.5 5000 milliLiter(s) (200 mL/Hr) CRRT <Continuous>  propofol Infusion 17.94 MICROgram(s)/kG/Min (10 mL/Hr) IV Continuous <Continuous>  senna 2 Tablet(s) Oral at bedtime  sodium chloride 0.9%. 1000 milliLiter(s) (5 mL/Hr) IV Continuous <Continuous>                 8.3    12.78 )-----------( 159      ( 07 Dec 2022 01:33 )             25.8       12-    139  |  102  |  40<H>  ----------------------------<  131<H>  3.6   |  23  |  1.91<H>    Ca    8.8      07 Dec 2022 01:32  Phos  4.1     12-  Mg     2.6         TPro  6.3  /  Alb  3.1<L>  /  TBili  1.1  /  DBili  x   /  AST  64<H>  /  ALT  9<L>  /  AlkPhos  238<H>        PT/INR - ( 07 Dec 2022 01:35 )   PT: 15.0 sec;   INR: 1.30 ratio         PTT - ( 07 Dec 2022 01:35 )  PTT:40.9 sec    Mode: AC/ CMV (Assist Control/ Continuous Mandatory Ventilation)  RR (machine): 14  FiO2: 40  PEEP: 12  ITime: 1  MAP: 18  PC: 10  PIP: 27      Daily     Daily Weight in k (07 Dec 2022 00:00)       @ 07:01  -   @ 07:00  --------------------------------------------------------  IN: 2213.6 mL / OUT: 2442 mL / NET: -228.4 mL        Critically Ill patient  : [ ] preoperative ,   [ ] post operative  [x] Non Operative    Requires :  [x ] Arterial Line   [ x] Central Line  [ ] PA catheter  [x] IABP  [x ] ECMO  [ ] LVAD  [x ] Ventilator  [ ] pacemaker [ ] Impella.                      [x ] ABG's     [x ] Pulse Oxymetry Monitoring  Bedside evaluation , monitoring , treatment of hemodynamics , fluids , IVP/ IVCD meds.        Diagnosis:      - [facility] - arrest in [facility]     MI     Acute Pancreatitis     ARDS     Fluid  overload     Post Arrest Shock state    Hypotension     CHF- acute [ x]   chronic [ x]    systolic [ x]   diatolic [ ]          - Echo- EF -             [ ] RV dysfunction          - Cxr-cardiomegally, edema          - Clinical-  [ ]inotropes   [x]pressors   [ ]diuresis   [x]IABP   [x]ECMO   [ ]LVAD   [x]Respiratory Failure.     Cardiogenic Shock - resolving    Renal Failure - Acute Kidney Injury     CVVHD yesterday    IVCD anticoagulation with [ ] Heparin  [x] Argatroban for  ECMO / MI    IABP   [x] management   [ ] wean 1:1 1:2 1:3   [ ] removal and f/u vascular checks     Hypernatremia     IVCD Insulin- DM    Requires bedside physical therapy, mobilization and total detention care.     IVCD Precedex  for vent synchrony     Thrombocytopenia     Respiratory Failure     Requires chest PT, pulmonary toilet, ambu bagging, suctioning to maintain SaO2,  patent airway and treat atelectasis.     Ventilator Management:  [x ]AC-rest    [ ]CPAP-PS Wean    [ ]Trach Collar     [ ]Extubate    [ ] T-Piece  [ ]peep>5     Difficult weaning process - multiple organ system involvement in critically ill patient     For Cardiac Cath                By signing my name below, I, Rosio Alonzo, attest that this documentation has been prepared under the direction and in the presence of Finn Zelaya MD.   Electronically Signed: Brian Burnett 22 @ 07:13      Discussed with CT surgeon, Physician Assistant - Nurse Practitioner- Critical care medicine team.   Discussed at  AM / PM rounds.   Chart, labs , films reviewed.    Cumulative Critical Care Time Given Today:  CRITICAL CARE ATTENDING - CTICU    MEDICATIONS  (STANDING):  argatroban Infusion 0.075 MICROgram(s)/kG/Min (0.42 mL/Hr) IV Continuous <Continuous>  artificial  tears Solution 1 Drop(s) Both EYES four times a day  atorvastatin 40 milliGRAM(s) Oral at bedtime  chlorhexidine 0.12% Liquid 15 milliLiter(s) Oral Mucosa every 12 hours  chlorhexidine 2% Cloths 1 Application(s) Topical <User Schedule>  CRRT Treatment    <Continuous>  dexMEDEtomidine Infusion 1.5 MICROgram(s)/kG/Hr (34.8 mL/Hr) IV Continuous <Continuous>  dextrose 50% Injectable 50 milliLiter(s) IV Push every 15 minutes  HYDROmorphone  Injectable 0.5 milliGRAM(s) IV Push every 6 hours  insulin regular Infusion 6 Unit(s)/Hr (6 mL/Hr) IV Continuous <Continuous>  norepinephrine Infusion 0.05 MICROgram(s)/kG/Min (8.71 mL/Hr) IV Continuous <Continuous>  pantoprazole  Injectable 40 milliGRAM(s) IV Push two times a day  polyethylene glycol 3350 17 Gram(s) Oral two times a day  PrismaSATE Dialysate BK 0 / 3.5 5000 milliLiter(s) (2000 mL/Hr) CRRT <Continuous>  PrismaSOL Filtration BGK 4 / 2.5 5000 milliLiter(s) (800 mL/Hr) CRRT <Continuous>  PrismaSOL Filtration BGK 4 / 2.5 5000 milliLiter(s) (200 mL/Hr) CRRT <Continuous>  propofol Infusion 17.94 MICROgram(s)/kG/Min (10 mL/Hr) IV Continuous <Continuous>  senna 2 Tablet(s) Oral at bedtime  sodium chloride 0.9%. 1000 milliLiter(s) (5 mL/Hr) IV Continuous <Continuous>                 8.3    12.78 )-----------( 159      ( 07 Dec 2022 01:33 )             25.8       12-    139  |  102  |  40<H>  ----------------------------<  131<H>  3.6   |  23  |  1.91<H>    Ca    8.8      07 Dec 2022 01:32  Phos  4.1     12-  Mg     2.6         TPro  6.3  /  Alb  3.1<L>  /  TBili  1.1  /  DBili  x   /  AST  64<H>  /  ALT  9<L>  /  AlkPhos  238<H>        PT/INR - ( 07 Dec 2022 01:35 )   PT: 15.0 sec;   INR: 1.30 ratio         PTT - ( 07 Dec 2022 01:35 )  PTT:40.9 sec    Mode: AC/ CMV (Assist Control/ Continuous Mandatory Ventilation)  RR (machine): 14  FiO2: 40  PEEP: 12  ITime: 1  MAP: 18  PC: 10  PIP: 27      Daily     Daily Weight in k (07 Dec 2022 00:00)       @ 07:01  -   @ 07:00  --------------------------------------------------------  IN: 2213.6 mL / OUT: 2442 mL / NET: -228.4 mL        Critically Ill patient  : [ ] preoperative ,   [ ] post operative  [x] Non Operative    Requires :  [x ] Arterial Line   [ x] Central Line  [ ] PA catheter  [x] IABP  [x ] ECMO  [ ] LVAD  [x ] Ventilator  [ ] pacemaker [ ] Impella.                      [x ] ABG's     [x ] Pulse Oxymetry Monitoring  Bedside evaluation , monitoring , treatment of hemodynamics , fluids , IVP/ IVCD meds.        Diagnosis:      - [facility] - arrest in S[facility]     MI     Acute Pancreatitis     ARDS     Fluid  overload     Post Arrest Shock state    Hypotension     CHF- acute [ x]   chronic [ x]    systolic [ x]   diatolic [ ]          - Echo- EF -             [ ] RV dysfunction          - Cxr-cardiomegally, edema          - Clinical-  [ ]inotropes   [x]pressors   [ ]diuresis   [x]IABP   [x]ECMO   [ ]LVAD   [x]Respiratory Failure.     Cardiogenic Shock - resolving    Renal Failure - Acute Kidney Injury     CVVHD yesterday    IVCD anticoagulation with [ ] Heparin  [x] Argatroban for  ECMO / MI    IABP   [x] management   [ ] wean 1:1 1:2 1:3   [ ] removal and f/u vascular checks     Hypernatremia     IVCD Insulin- DM    Requires bedside physical therapy, mobilization and total [facility] care.     IVCD Precedex  for vent synchrony     Thrombocytopenia     Respiratory Failure     Requires chest PT, pulmonary toilet, ambu bagging, suctioning to maintain SaO2,  patent airway and treat atelectasis.     Ventilator Management:  [x ]AC-rest    [ ]CPAP-PS Wean    [ ]Trach Collar     [ ]Extubate    [ ] T-Piece  [ ]peep>5     Difficult weaning process - multiple organ system involvement in critically ill patient     For Cardiac Cath                By signing my name below, I, Rosio Alonzo, attest that this documentation has been prepared under the direction and in the presence of Finn Zelaya MD.   Electronically Signed: Brian Burnett 22 @ 07:13      Discussed with CT surgeon, Physician Assistant - Nurse Practitioner- Critical care medicine team.   Discussed at  AM / PM rounds.   Chart, labs , films reviewed.    Cumulative Critical Care Time Given Today:  CRITICAL CARE ATTENDING - CTICU    MEDICATIONS  (STANDING):  argatroban Infusion 0.075 MICROgram(s)/kG/Min (0.42 mL/Hr) IV Continuous <Continuous>  artificial  tears Solution 1 Drop(s) Both EYES four times a day  atorvastatin 40 milliGRAM(s) Oral at bedtime  chlorhexidine 0.12% Liquid 15 milliLiter(s) Oral Mucosa every 12 hours  chlorhexidine 2% Cloths 1 Application(s) Topical <User Schedule>  CRRT Treatment    <Continuous>  dexMEDEtomidine Infusion 1.5 MICROgram(s)/kG/Hr (34.8 mL/Hr) IV Continuous <Continuous>  dextrose 50% Injectable 50 milliLiter(s) IV Push every 15 minutes  HYDROmorphone  Injectable 0.5 milliGRAM(s) IV Push every 6 hours  insulin regular Infusion 6 Unit(s)/Hr (6 mL/Hr) IV Continuous <Continuous>  norepinephrine Infusion 0.05 MICROgram(s)/kG/Min (8.71 mL/Hr) IV Continuous <Continuous>  pantoprazole  Injectable 40 milliGRAM(s) IV Push two times a day  polyethylene glycol 3350 17 Gram(s) Oral two times a day  PrismaSATE Dialysate BK 0 / 3.5 5000 milliLiter(s) (2000 mL/Hr) CRRT <Continuous>  PrismaSOL Filtration BGK 4 / 2.5 5000 milliLiter(s) (800 mL/Hr) CRRT <Continuous>  PrismaSOL Filtration BGK 4 / 2.5 5000 milliLiter(s) (200 mL/Hr) CRRT <Continuous>  propofol Infusion 17.94 MICROgram(s)/kG/Min (10 mL/Hr) IV Continuous <Continuous>  senna 2 Tablet(s) Oral at bedtime  sodium chloride 0.9%. 1000 milliLiter(s) (5 mL/Hr) IV Continuous <Continuous>                 8.3    12.78 )-----------( 159      ( 07 Dec 2022 01:33 )             25.8       12-    139  |  102  |  40<H>  ----------------------------<  131<H>  3.6   |  23  |  1.91<H>    Ca    8.8      07 Dec 2022 01:32  Phos  4.1     12-  Mg     2.6         TPro  6.3  /  Alb  3.1<L>  /  TBili  1.1  /  DBili  x   /  AST  64<H>  /  ALT  9<L>  /  AlkPhos  238<H>        PT/INR - ( 07 Dec 2022 01:35 )   PT: 15.0 sec;   INR: 1.30 ratio         PTT - ( 07 Dec 2022 01:35 )  PTT:40.9 sec    Mode: AC/ CMV (Assist Control/ Continuous Mandatory Ventilation)  RR (machine): 14  FiO2: 40  PEEP: 12  ITime: 1  MAP: 18  PC: 10  PIP: 27      Daily     Daily Weight in k (07 Dec 2022 00:00)       @ 07:01  -   @ 07:00  --------------------------------------------------------  IN: 2213.6 mL / OUT: 2442 mL / NET: -228.4 mL        Critically Ill patient  : [ ] preoperative ,   [ ] post operative  [x] Non Operative    Requires :  [x ] Arterial Line   [ x] Central Line  [ ] PA catheter  [x] IABP  [x ] ECMO  [ ] LVAD  [x ] Ventilator  [ ] pacemaker [ ] Impella.                      [x ] ABG's     [x ] Pulse Oxymetry Monitoring  Bedside evaluation , monitoring , treatment of hemodynamics , fluids , IVP/ IVCD meds.        Diagnosis:      - [facility] ECMO - arrest in SICU     MI     Acute Pancreatitis     ARDS     Fluid  overload     Post Arrest Shock state    Hypotension     CHF- acute [ x]   chronic [ x]    systolic [ x]   diatolic [ ]          - Echo- EF -             [ ] RV dysfunction          - Cxr-cardiomegally, edema          - Clinical-  [ ]inotropes   [x]pressors   [ ]diuresis   [x]IABP   [x]ECMO   [ ]LVAD   [x]Respiratory Failure.     Cardiogenic Shock - resolving    Renal Failure - Acute Kidney Injury     CVVHD yesterday    IVCD anticoagulation with [ ] Heparin  [x] Argatroban for  ECMO / MI    IABP   [x] management   [ ] wean 1:1 1:2 1:3   [ ] removal and f/u vascular checks     Hypernatremia     IVCD Insulin- DM    Requires bedside physical therapy, mobilization and total senior care care.     IVCD Precedex  for vent synchrony     Thrombocytopenia     Respiratory Failure     Requires chest PT, pulmonary toilet, ambu bagging, suctioning to maintain SaO2,  patent airway and treat atelectasis.     Ventilator Management:  [x ]AC-rest    [ ]CPAP-PS Wean    [ ]Trach Collar     [ ]Extubate    [ ] T-Piece  [ ]peep>5     Difficult weaning process - multiple organ system involvement in critically ill patient     For Cardiac Cath                By signing my name below, I, Rosio Alonzo, attest that this documentation has been prepared under the direction and in the presence of Finn Zelaya MD.   Electronically Signed: Brian Burnett 22 @ 07:13      Discussed with CT surgeon, Physician Assistant - Nurse Practitioner- Critical care medicine team.   Discussed at  AM / PM rounds.   Chart, labs , films reviewed.    Cumulative Critical Care Time Given Today:  CRITICAL CARE ATTENDING - CTICU    MEDICATIONS  (STANDING):  argatroban Infusion 0.075 MICROgram(s)/kG/Min (0.42 mL/Hr) IV Continuous <Continuous>  artificial  tears Solution 1 Drop(s) Both EYES four times a day  atorvastatin 40 milliGRAM(s) Oral at bedtime  chlorhexidine 0.12% Liquid 15 milliLiter(s) Oral Mucosa every 12 hours  chlorhexidine 2% Cloths 1 Application(s) Topical <User Schedule>  CRRT Treatment    <Continuous>  dexMEDEtomidine Infusion 1.5 MICROgram(s)/kG/Hr (34.8 mL/Hr) IV Continuous <Continuous>  dextrose 50% Injectable 50 milliLiter(s) IV Push every 15 minutes  HYDROmorphone  Injectable 0.5 milliGRAM(s) IV Push every 6 hours  insulin regular Infusion 6 Unit(s)/Hr (6 mL/Hr) IV Continuous <Continuous>  norepinephrine Infusion 0.05 MICROgram(s)/kG/Min (8.71 mL/Hr) IV Continuous <Continuous>  pantoprazole  Injectable 40 milliGRAM(s) IV Push two times a day  polyethylene glycol 3350 17 Gram(s) Oral two times a day  PrismaSATE Dialysate BK 0 / 3.5 5000 milliLiter(s) (2000 mL/Hr) CRRT <Continuous>  PrismaSOL Filtration BGK 4 / 2.5 5000 milliLiter(s) (800 mL/Hr) CRRT <Continuous>  PrismaSOL Filtration BGK 4 / 2.5 5000 milliLiter(s) (200 mL/Hr) CRRT <Continuous>  propofol Infusion 17.94 MICROgram(s)/kG/Min (10 mL/Hr) IV Continuous <Continuous>  senna 2 Tablet(s) Oral at bedtime  sodium chloride 0.9%. 1000 milliLiter(s) (5 mL/Hr) IV Continuous <Continuous>                 8.3    12.78 )-----------( 159      ( 07 Dec 2022 01:33 )             25.8       12-    139  |  102  |  40<H>  ----------------------------<  131<H>  3.6   |  23  |  1.91<H>    Ca    8.8      07 Dec 2022 01:32  Phos  4.1     12-  Mg     2.6         TPro  6.3  /  Alb  3.1<L>  /  TBili  1.1  /  DBili  x   /  AST  64<H>  /  ALT  9<L>  /  AlkPhos  238<H>        PT/INR - ( 07 Dec 2022 01:35 )   PT: 15.0 sec;   INR: 1.30 ratio         PTT - ( 07 Dec 2022 01:35 )  PTT:40.9 sec    Mode: AC/ CMV (Assist Control/ Continuous Mandatory Ventilation)  RR (machine): 14  FiO2: 40  PEEP: 12  ITime: 1  MAP: 18  PC: 10  PIP: 27      Daily     Daily Weight in k (07 Dec 2022 00:00)       @ 07:01  -   @ 07:00  --------------------------------------------------------  IN: 2213.6 mL / OUT: 2442 mL / NET: -228.4 mL        Critically Ill patient  : [ ] preoperative ,   [ ] post operative  [x] Non Operative    Requires :  [x ] Arterial Line   [ x] Central Line  [ ] PA catheter  [x] IABP  [x ] ECMO  [ ] LVAD  [x ] Ventilator  [ ] pacemaker [ ] Impella.                      [x ] ABG's     [x ] Pulse Oxymetry Monitoring  Bedside evaluation , monitoring , treatment of hemodynamics , fluids , IVP/ IVCD meds.        Diagnosis:      - [facility] - arrest in SICU     MI     Acute Pancreatitis     ARDS     Fluid  overload     Post Arrest Shock state    Hypotension     CHF- acute [ x]   chronic [ x]    systolic [ x]   diatolic [ ]          - Echo- EF -   30 %          [ ] RV dysfunction          - Cxr-cardiomegally, edema          - Clinical-  [ ]inotropes   [x]pressors   [ ]diuresis   [x]IABP   [x]ECMO   [ ]LVAD   [x]Respiratory Failure.     Cardiogenic Shock - resolving    Renal Failure - Acute Kidney Injury     CVVHD     IVCD anticoagulation with [ ] Heparin  [x] Argatroban for  ECMO / MI    IABP   [x] management   [ ] wean 1:1 1:2 1:3   [ ] removal and f/u vascular checks     Hypernatremia     IVCD Insulin- DM    Requires bedside physical therapy, mobilization and total [facility] care.     IVCD Precedex  for vent synchrony     Thrombocytopenia     Respiratory Failure     Requires chest PT, pulmonary toilet, ambu bagging, suctioning to maintain SaO2,  patent airway and treat atelectasis.     Ventilator Management:  [x ]AC-rest    [ ]CPAP-PS Wean    [ ]Trach Collar     [ ]Extubate    [ ] T-Piece  [ ]peep>5     Difficult weaning process - multiple organ system involvement in critically ill patient     ECMO turn down / wean  echo                By signing my name below, I, Rosio Alonzo, attest that this documentation has been prepared under the direction and in the presence of Finn Zelaya MD.   Electronically Signed: Brian Burnett 22 @ 07:13    I, Finn Zelaya, personally performed the services described in this documentation. All medical record entries made by the scribe were at my direction and in my presence. I have reviewed the chart and agree that the record reflects my personal performance and is accurate and complete.   Finn Zelaya MD.       Discussed with CT surgeon, Physician Assistant - Nurse Practitioner- Critical care medicine team.   Discussed at  AM / PM rounds.   Chart, labs , films reviewed.    Cumulative Critical Care Time Given Today:  90 min CRITICAL CARE ATTENDING - CTICU    MEDICATIONS  (STANDING):  argatroban Infusion 0.075 MICROgram(s)/kG/Min (0.42 mL/Hr) IV Continuous <Continuous>  artificial  tears Solution 1 Drop(s) Both EYES four times a day  atorvastatin 40 milliGRAM(s) Oral at bedtime  chlorhexidine 0.12% Liquid 15 milliLiter(s) Oral Mucosa every 12 hours  chlorhexidine 2% Cloths 1 Application(s) Topical <User Schedule>  CRRT Treatment    <Continuous>  dexMEDEtomidine Infusion 1.5 MICROgram(s)/kG/Hr (34.8 mL/Hr) IV Continuous <Continuous>  dextrose 50% Injectable 50 milliLiter(s) IV Push every 15 minutes  HYDROmorphone  Injectable 0.5 milliGRAM(s) IV Push every 6 hours  insulin regular Infusion 6 Unit(s)/Hr (6 mL/Hr) IV Continuous <Continuous>  norepinephrine Infusion 0.05 MICROgram(s)/kG/Min (8.71 mL/Hr) IV Continuous <Continuous>  pantoprazole  Injectable 40 milliGRAM(s) IV Push two times a day  polyethylene glycol 3350 17 Gram(s) Oral two times a day  PrismaSATE Dialysate BK 0 / 3.5 5000 milliLiter(s) (2000 mL/Hr) CRRT <Continuous>  PrismaSOL Filtration BGK 4 / 2.5 5000 milliLiter(s) (800 mL/Hr) CRRT <Continuous>  PrismaSOL Filtration BGK 4 / 2.5 5000 milliLiter(s) (200 mL/Hr) CRRT <Continuous>  propofol Infusion 17.94 MICROgram(s)/kG/Min (10 mL/Hr) IV Continuous <Continuous>  senna 2 Tablet(s) Oral at bedtime  sodium chloride 0.9%. 1000 milliLiter(s) (5 mL/Hr) IV Continuous <Continuous>                 8.3    12.78 )-----------( 159      ( 07 Dec 2022 01:33 )             25.8       12-    139  |  102  |  40<H>  ----------------------------<  131<H>  3.6   |  23  |  1.91<H>    Ca    8.8      07 Dec 2022 01:32  Phos  4.1     12-  Mg     2.6         TPro  6.3  /  Alb  3.1<L>  /  TBili  1.1  /  DBili  x   /  AST  64<H>  /  ALT  9<L>  /  AlkPhos  238<H>        PT/INR - ( 07 Dec 2022 01:35 )   PT: 15.0 sec;   INR: 1.30 ratio         PTT - ( 07 Dec 2022 01:35 )  PTT:40.9 sec    Mode: AC/ CMV (Assist Control/ Continuous Mandatory Ventilation)  RR (machine): 14  FiO2: 40  PEEP: 12  ITime: 1  MAP: 18  PC: 10  PIP: 27      Daily     Daily Weight in k (07 Dec 2022 00:00)       @ 07:01  -   @ 07:00  --------------------------------------------------------  IN: 2213.6 mL / OUT: 2442 mL / NET: -228.4 mL        Critically Ill patient  : [ ] preoperative ,   [ ] post operative  [x] Non Operative    Requires :  [x ] Arterial Line   [ x] Central Line  [ ] PA catheter  [x] IABP  [x ] ECMO  [ ] LVAD  [x ] Ventilator  [ ] pacemaker [ ] Impella.                      [x ] ABG's     [x ] Pulse Oxymetry Monitoring  Bedside evaluation , monitoring , treatment of hemodynamics , fluids , IVP/ IVCD meds.        Diagnosis:      - [facility]O - arrest in SICU     MI     Acute Pancreatitis     ARDS     Fluid  overload     Post Arrest Shock state    Hypotension     CHF- acute [ x]   chronic [ x]    systolic [ x]   diatolic [ ]          - Echo- EF -   30 %          [ ] RV dysfunction          - Cxr-cardiomegally, edema          - Clinical-  [ ]inotropes   [x]pressors   [ ]diuresis   [x]IABP   [x]ECMO   [ ]LVAD   [x]Respiratory Failure.     Cardiogenic Shock - resolving    Renal Failure - Acute Kidney Injury     CVVHD     IVCD anticoagulation with [ ] Heparin  [x] Argatroban for  ECMO / MI    IABP   [x] management   [ ] wean 1:1 1:2 1:3   [ ] removal and f/u vascular checks     Hypernatremia     IVCD Insulin- DM    Requires bedside physical therapy, mobilization and total [facility] care.     IVCD Precedex  for vent synchrony     Thrombocytopenia     Respiratory Failure     Requires chest PT, pulmonary toilet, ambu bagging, suctioning to maintain SaO2,  patent airway and treat atelectasis.     Ventilator Management:  [x ]AC-rest    [ ]CPAP-PS Wean    [ ]Trach Collar     [ ]Extubate    [ ] T-Piece  [ ]peep>5     Difficult weaning process - multiple organ system involvement in critically ill patient     ECMO turn down / wean  echo                By signing my name below, I, Rosio Alonzo, attest that this documentation has been prepared under the direction and in the presence of Finn Zelaya MD.   Electronically Signed: Brian Burnett 22 @ 07:13    I, Finn Zelaya, personally performed the services described in this documentation. All medical record entries made by the scribe were at my direction and in my presence. I have reviewed the chart and agree that the record reflects my personal performance and is accurate and complete.   Finn Zelaya MD.       Discussed with CT surgeon, Physician Assistant - Nurse Practitioner- Critical care medicine team.   Discussed at  AM / PM rounds.   Chart, labs , films reviewed.    Cumulative Critical Care Time Given Today:  90 min CRITICAL CARE ATTENDING - CTICU    MEDICATIONS  (STANDING):  argatroban Infusion 0.075 MICROgram(s)/kG/Min (0.42 mL/Hr) IV Continuous <Continuous>  artificial  tears Solution 1 Drop(s) Both EYES four times a day  atorvastatin 40 milliGRAM(s) Oral at bedtime  chlorhexidine 0.12% Liquid 15 milliLiter(s) Oral Mucosa every 12 hours  chlorhexidine 2% Cloths 1 Application(s) Topical <User Schedule>  CRRT Treatment    <Continuous>  dexMEDEtomidine Infusion 1.5 MICROgram(s)/kG/Hr (34.8 mL/Hr) IV Continuous <Continuous>  dextrose 50% Injectable 50 milliLiter(s) IV Push every 15 minutes  HYDROmorphone  Injectable 0.5 milliGRAM(s) IV Push every 6 hours  insulin regular Infusion 6 Unit(s)/Hr (6 mL/Hr) IV Continuous <Continuous>  norepinephrine Infusion 0.05 MICROgram(s)/kG/Min (8.71 mL/Hr) IV Continuous <Continuous>  pantoprazole  Injectable 40 milliGRAM(s) IV Push two times a day  polyethylene glycol 3350 17 Gram(s) Oral two times a day  PrismaSATE Dialysate BK 0 / 3.5 5000 milliLiter(s) (2000 mL/Hr) CRRT <Continuous>  PrismaSOL Filtration BGK 4 / 2.5 5000 milliLiter(s) (800 mL/Hr) CRRT <Continuous>  PrismaSOL Filtration BGK 4 / 2.5 5000 milliLiter(s) (200 mL/Hr) CRRT <Continuous>  propofol Infusion 17.94 MICROgram(s)/kG/Min (10 mL/Hr) IV Continuous <Continuous>  senna 2 Tablet(s) Oral at bedtime  sodium chloride 0.9%. 1000 milliLiter(s) (5 mL/Hr) IV Continuous <Continuous>                 8.3    12.78 )-----------( 159      ( 07 Dec 2022 01:33 )             25.8       12-    139  |  102  |  40<H>  ----------------------------<  131<H>  3.6   |  23  |  1.91<H>    Ca    8.8      07 Dec 2022 01:32  Phos  4.1     12-  Mg     2.6         TPro  6.3  /  Alb  3.1<L>  /  TBili  1.1  /  DBili  x   /  AST  64<H>  /  ALT  9<L>  /  AlkPhos  238<H>        PT/INR - ( 07 Dec 2022 01:35 )   PT: 15.0 sec;   INR: 1.30 ratio         PTT - ( 07 Dec 2022 01:35 )  PTT:40.9 sec    Mode: AC/ CMV (Assist Control/ Continuous Mandatory Ventilation)  RR (machine): 14  FiO2: 40  PEEP: 12  ITime: 1  MAP: 18  PC: 10  PIP: 27      Daily     Daily Weight in k (07 Dec 2022 00:00)       @ 07:01  -   @ 07:00  --------------------------------------------------------  IN: 2213.6 mL / OUT: 2442 mL / NET: -228.4 mL        Critically Ill patient  : [ ] preoperative ,   [ ] post operative  [x] Non Operative    Requires :  [x ] Arterial Line   [ x] Central Line  [ ] PA catheter  [x] IABP  [x ] ECMO  [ ] LVAD  [x ] Ventilator  [ ] pacemaker [ ] Impella.                      [x ] ABG's     [x ] Pulse Oxymetry Monitoring  Bedside evaluation , monitoring , treatment of hemodynamics , fluids , IVP/ IVCD meds.        Diagnosis:      - [facility] ECMO - arrest in SICU     MI     Acute Pancreatitis     ARDS     Fluid  overload     Post Arrest Shock state    Hypotension     CHF- acute [ x]   chronic [ x]    systolic [ x]   diatolic [ ]          - Echo- EF -   30 %          [ ] RV dysfunction          - Cxr-cardiomegally, edema          - Clinical-  [ ]inotropes   [x]pressors   [ ]diuresis   [x]IABP   [x]ECMO   [ ]LVAD   [x]Respiratory Failure.     Cardiogenic Shock - resolving    Renal Failure - Acute Kidney Injury     CVVHD     IVCD anticoagulation with [ ] Heparin  [x] Argatroban for  ECMO / MI    IABP   [x] management   [ ] wean 1:1 1:2 1:3   [ ] removal and f/u vascular checks     Hypernatremia     IVCD Insulin- DM    Requires bedside physical therapy, mobilization and total shelter care.     IVCD Precedex  for vent synchrony     Thrombocytopenia     Respiratory Failure     Requires chest PT, pulmonary toilet, ambu bagging, suctioning to maintain SaO2,  patent airway and treat atelectasis.     Ventilator Management:  [x ]AC-rest    [ ]CPAP-PS Wean    [ ]Trach Collar     [ ]Extubate    [ ] T-Piece  [ ]peep>5     Difficult weaning process - multiple organ system involvement in critically ill patient     ECMO turn down / wean  echo                By signing my name below, I, Rosio Alonzo, attest that this documentation has been prepared under the direction and in the presence of Finn Zelaya MD.   Electronically Signed: Brian Burnett 22 @ 07:13    I, Finn Zelaya, personally performed the services described in this documentation. All medical record entries made by the scribe were at my direction and in my presence. I have reviewed the chart and agree that the record reflects my personal performance and is accurate and complete.   Finn Zelaya MD.       Discussed with CT surgeon, Physician Assistant - Nurse Practitioner- Critical care medicine team.   Discussed at  AM / PM rounds.   Chart, labs , films reviewed.    Cumulative Critical Care Time Given Today:  90 min

## 2022-12-07 NOTE — PROGRESS NOTE ADULT - ATTENDING COMMENTS
ERIC on CKD3B likely  ATN  s/p cath = no intervention could be performed  IABP  CRRT for clearances  Keep net even    Melissa Mercer MD  Off: [phone]  contact me on teams    (After 5 pm or on weekends please page the on-call fellow/attending, can check [email or web address] for schedule. Login is carmen cantor, schedule under [facility] medicine, psych, derm) ERIC on CKD3B likely  ATN  s/p cath = no intervention could be performed  IABP  CRRT for clearances  Keep net even    Melissa Mercer MD  Off: [phone]  contact me on teams    (After 5 pm or on weekends please page the on-call fellow/attending, can check [email or web address] for schedule. Login is carmen cantor, schedule under [facility] medicine, psych, derm) ERIC on CKD3B likely  ATN  s/p cath = no intervention could be performed  IABP  CRRT for clearances  Keep net even    Melissa Mercer MD  Off: [phone]  contact me on teams    (After 5 pm or on weekends please page the on-call fellow/attending, can check [email or web address] for schedule. Login is carmen cantor, schedule under [facility] medicine, psych, derm)

## 2022-12-07 NOTE — PROGRESS NOTE ADULT - SUBJECTIVE AND OBJECTIVE BOX
Patient seen and examined at the bedside.    Remained critically ill on continuous ICU monitoring.    OBJECTIVE:  Vital Signs Last 24 Hrs  T(C): 36.5 (07 Dec 2022 16:00), Max: 36.5 (07 Dec 2022 12:00)  T(F): 97.7 (07 Dec 2022 16:00), Max: 97.7 (07 Dec 2022 12:00)  HR: 83 (07 Dec 2022 19:15) (72 - 103)  BP: --  BP(mean): --  RR: 14 (07 Dec 2022 19:15) (8 - 20)  SpO2: 100% (07 Dec 2022 19:15) (100% - 100%)    Parameters below as of 07 Dec 2022 16:00  Patient On (Oxygen Delivery Method): ventilator          Physical Exam:   General: Intubated, multiple lines gtt  Neurology: Sedated, off sedation follows simple commands, COBB x4  Eyes: bilateral pupils equal and reactive   ENT/Neck: +ETT midline, Neck supple, trachea midline, No JVD   Respiratory: rhonchi bilaterally   CV: S1S2, no murmurs        [x] Sinus rhythm  Abdominal: Softly distended,+BS   Extremities: 1+ pedal edema noted, + peripheral pulses palpable, warm  Skin: No Rashes, Hematoma, Ecchymosis                      ============================I/O===========================   I&O's Detail    06 Dec 2022 07:01  -  07 Dec 2022 07:00  --------------------------------------------------------  IN:    Argatroban: 8 mL    Dexmedetomidine: 765.6 mL    Enteral Tube Flush: 150 mL    Insulin: 38 mL    IV PiggyBack: 350 mL    Norepinephrine: 92 mL    PRBCs (Packed Red Blood Cells): 300 mL    sodium chloride 0.9%: 110 mL    Vital1.5: 400 mL  Total IN: 2213.6 mL    OUT:    Indwelling Catheter - Urethral (mL): 221 mL    Other (mL): 2339 mL    Propofol: 0 mL  Total OUT: 2560 mL    Total NET: -346.4 mL      07 Dec 2022 07:01  -  07 Dec 2022 19:44  --------------------------------------------------------  IN:    Argatroban: 0.8 mL    Argatroban: 2.5 mL    Argatroban: 3.5 mL    Dexmedetomidine: 410.7 mL    Insulin: 52 mL    IV PiggyBack: 50 mL    Norepinephrine: 26.1 mL    sodium chloride 0.9%: 60 mL    Vital1.5: 645 mL  Total IN: 1250.6 mL    OUT:    Indwelling Catheter - Urethral (mL): 40 mL    Other (mL): 1515 mL    Propofol: 0 mL  Total OUT: 1555 mL    Total NET: -304.4 mL        ============================ LABS =========================                        8.3    12.78 )-----------( 159      ( 07 Dec 2022 01:33 )             25.8     12-07    139  |  102  |  40<H>  ----------------------------<  131<H>  3.6   |  23  |  1.91<H>    Ca    8.8      07 Dec 2022 01:32  Phos  4.1     12-07  Mg     2.6     12-07    TPro  6.3  /  Alb  3.1<L>  /  TBili  1.1  /  DBili  x   /  AST  64<H>  /  ALT  9<L>  /  AlkPhos  238<H>  12-07    LIVER FUNCTIONS - ( 07 Dec 2022 01:32 )  Alb: 3.1 g/dL / Pro: 6.3 g/dL / ALK PHOS: 238 U/L / ALT: 9 U/L / AST: 64 U/L / GGT: x           PT/INR - ( 07 Dec 2022 01:35 )   PT: 15.0 sec;   INR: 1.30 ratio         PTT - ( 07 Dec 2022 17:58 )  PTT:43.3 sec  ABG - ( 07 Dec 2022 17:43 )  pH, Arterial: 7.40  pH, Blood: x     /  pCO2: 41    /  pO2: 178   / HCO3: 25    / Base Excess: 0.5   /  SaO2: 98.6                ======================Micro/Rad/Cardio=================  Culture: Reviewed   CXR: Reviewed  Echo: Reviewed  ======================================================  PAST MEDICAL & SURGICAL HISTORY:      ======================================================  Assessment:  61 y/o male with PMH of CABG, DM, HTN, HLD presenting as transfer from [place] for c/f STEMI. PTA arrival received 325 aspirin, 600 plavix, and no heparin drip started. Around 1:30 am patient had multiple episodes of non-bloody diarrhea and emesis with associated abdominal pain and chest pain, unable to localize, non-radiating, with associated SOB and no associated palpitations or diaphoresis. No recent fevers/chills, cough, rashes, or changes in urination. Does report mild diffuse back pain; started 5 days ago in setting on injury while walking and lifting objects. Denies focal weakness, numbness/tingling, or LOC due does report mild dizziness.    acute rspiratory distress , cardiogenic shock s/p [facility] ECMO 11/25  Hemodynamic instability  Hypovolemia  Acute blood loss anemia  acute pancreatitis      ======================================================  Plan:   ***Neuro***  CT head showed 4mm subdural hematoma 11/26: repeat CT head unchanged 12/01  Plan for additional Repeat CT for Pancreatitis assessment   [x] Sedated with [x] Precedex   Post operative neuro assessment   PRN Dilaudid for pain      ***Cardiovascular***  12/3 TTE: EF 30-35%, moderate-severe LV dysfunction  Invasive hemodynamic monitoring, assess perfusion indices   12/7 TTE turndown EF 30-40%, normal RV, MR mod   SR / CVP 8/ MAP 64/ Hct 25/ Lactate 0.9  [x] Levophed currently off   [x] L Femoral IABP put in today with good augmentation @ 1:1 [x] ECMO- 3300 rpm, 3.52 flow => plan to decannulate tomorrow    Continuos reassessment of hemodynamics   [x] Statin  [x] AC Therapy with Argatroban gtt for ECMO circuit (PTT 40-45)  cardiac cath today showed patent LIMA graft and other grafts occluded      ***Pulmonary***  Post op vent management   Titration of FiO2 and PEEP, follow SpO2, CXR, blood gasses   100% FiO2 on ECMO      Mode: AC/ CMV (Assist Control/ Continuous Mandatory Ventilation)  RR (machine): 14  FiO2: 40  PEEP: 12  PS: 40  ITime: 1  MAP: 15  PC: 10  PIP: 23              ***GI***  [x] NPO after MN for ECMO decannulation   [x] Protonix    Bowel regimen with Miralax and senna       ***Renal***  ERIC, renal following  Restarted CVVHD yesterday   Even fluid goal  Continue to monitor I/Os, BUN/Creatinine.   Replete lytes PRN  De Dios present      ***ID***  No active antibiotic coverage      ***Endocrine***  [x]  DM2: HbA1c 7.3%                - [x] Insulin gtt              - Need tight glycemic control to prevent wound infection.    ***GOC***  Next family meeting 12/13 at 1:30PM          Care Plan discussed with the ICU care team. Patient remained critical, at risk for life threatening decompensation.     I have spent 30 minutes providing critical care management to this patient.      By signing my name below, I, Leela Avila, attest that this documentation has been prepared under the direction and in the presence of Yusef Millan MD.  Electronically signed: Brian Oakes, 12-07-22 @ 19:44    I, Monty Holbrook, personally performed the services described in this documentation. all medical record entries made by the scribe were at my direction and in my presence. I have reviewed the chart and agree that the record reflects my personal performance and is accurate and complete  Electronically signed: Yusef Millan MD. Patient seen and examined at the bedside.    Remained critically ill on continuous ICU monitoring.    OBJECTIVE:  Vital Signs Last 24 Hrs  T(C): 36.5 (07 Dec 2022 16:00), Max: 36.5 (07 Dec 2022 12:00)  T(F): 97.7 (07 Dec 2022 16:00), Max: 97.7 (07 Dec 2022 12:00)  HR: 83 (07 Dec 2022 19:15) (72 - 103)  BP: --  BP(mean): --  RR: 14 (07 Dec 2022 19:15) (8 - 20)  SpO2: 100% (07 Dec 2022 19:15) (100% - 100%)    Parameters below as of 07 Dec 2022 16:00  Patient On (Oxygen Delivery Method): ventilator          Physical Exam:   General: Intubated, multiple lines gtt  Neurology: Sedated, off sedation follows simple commands, COBB x4  Eyes: bilateral pupils equal and reactive   ENT/Neck: +ETT midline, Neck supple, trachea midline, No JVD   Respiratory: rhonchi bilaterally   CV: S1S2, no murmurs        [x] Sinus rhythm  Abdominal: Softly distended,+BS   Extremities: 1+ pedal edema noted, + peripheral pulses palpable, warm  Skin: No Rashes, Hematoma, Ecchymosis                      ============================I/O===========================   I&O's Detail    06 Dec 2022 07:01  -  07 Dec 2022 07:00  --------------------------------------------------------  IN:    Argatroban: 8 mL    Dexmedetomidine: 765.6 mL    Enteral Tube Flush: 150 mL    Insulin: 38 mL    IV PiggyBack: 350 mL    Norepinephrine: 92 mL    PRBCs (Packed Red Blood Cells): 300 mL    sodium chloride 0.9%: 110 mL    Vital1.5: 400 mL  Total IN: 2213.6 mL    OUT:    Indwelling Catheter - Urethral (mL): 221 mL    Other (mL): 2339 mL    Propofol: 0 mL  Total OUT: 2560 mL    Total NET: -346.4 mL      07 Dec 2022 07:01  -  07 Dec 2022 19:44  --------------------------------------------------------  IN:    Argatroban: 0.8 mL    Argatroban: 2.5 mL    Argatroban: 3.5 mL    Dexmedetomidine: 410.7 mL    Insulin: 52 mL    IV PiggyBack: 50 mL    Norepinephrine: 26.1 mL    sodium chloride 0.9%: 60 mL    Vital1.5: 645 mL  Total IN: 1250.6 mL    OUT:    Indwelling Catheter - Urethral (mL): 40 mL    Other (mL): 1515 mL    Propofol: 0 mL  Total OUT: 1555 mL    Total NET: -304.4 mL        ============================ LABS =========================                        8.3    12.78 )-----------( 159      ( 07 Dec 2022 01:33 )             25.8     12-07    139  |  102  |  40<H>  ----------------------------<  131<H>  3.6   |  23  |  1.91<H>    Ca    8.8      07 Dec 2022 01:32  Phos  4.1     12-07  Mg     2.6     12-07    TPro  6.3  /  Alb  3.1<L>  /  TBili  1.1  /  DBili  x   /  AST  64<H>  /  ALT  9<L>  /  AlkPhos  238<H>  12-07    LIVER FUNCTIONS - ( 07 Dec 2022 01:32 )  Alb: 3.1 g/dL / Pro: 6.3 g/dL / ALK PHOS: 238 U/L / ALT: 9 U/L / AST: 64 U/L / GGT: x           PT/INR - ( 07 Dec 2022 01:35 )   PT: 15.0 sec;   INR: 1.30 ratio         PTT - ( 07 Dec 2022 17:58 )  PTT:43.3 sec  ABG - ( 07 Dec 2022 17:43 )  pH, Arterial: 7.40  pH, Blood: x     /  pCO2: 41    /  pO2: 178   / HCO3: 25    / Base Excess: 0.5   /  SaO2: 98.6                ======================Micro/Rad/Cardio=================  Culture: Reviewed   CXR: Reviewed  Echo: Reviewed  ======================================================  PAST MEDICAL & SURGICAL HISTORY:      ======================================================  Assessment:  61 y/o male with PMH of CABG, DM, HTN, HLD presenting as transfer from [place] for c/f STEMI. PTA arrival received 325 aspirin, 600 plavix, and no heparin drip started. Around 1:30 am patient had multiple episodes of non-bloody diarrhea and emesis with associated abdominal pain and chest pain, unable to localize, non-radiating, with associated SOB and no associated palpitations or diaphoresis. No recent fevers/chills, cough, rashes, or changes in urination. Does report mild diffuse back pain; started 5 days ago in setting on injury while walking and lifting objects. Denies focal weakness, numbness/tingling, or LOC due does report mild dizziness.    acute rspiratory distress , cardiogenic shock s/p [facility] ECMO 11/25  Hemodynamic instability  Hypovolemia  Acute blood loss anemia  acute pancreatitis      ======================================================  Plan:   ***Neuro***  CT head showed 4mm subdural hematoma 11/26: repeat CT head unchanged 12/01  Plan for additional Repeat CT for Pancreatitis assessment   [x] Sedated with [x] Precedex   Post operative neuro assessment   PRN Dilaudid for pain      ***Cardiovascular***  12/3 TTE: EF 30-35%, moderate-severe LV dysfunction  Invasive hemodynamic monitoring, assess perfusion indices   12/7 TTE turndown EF 30-40%, normal RV, MR mod   SR / CVP 8/ MAP 64/ Hct 25/ Lactate 0.9  [x] Levophed currently off   [x] L Femoral IABP put in today with good augmentation @ 1:1 [x] ECMO- 3300 rpm, 3.52 flow => plan to decannulate tomorrow    Continuos reassessment of hemodynamics   [x] Statin  [x] AC Therapy with Argatroban gtt for ECMO circuit (PTT 40-45)  cardiac cath today showed patent LIMA graft and other grafts occluded      ***Pulmonary***  Post op vent management   Titration of FiO2 and PEEP, follow SpO2, CXR, blood gasses   100% FiO2 on ECMO      Mode: AC/ CMV (Assist Control/ Continuous Mandatory Ventilation)  RR (machine): 14  FiO2: 40  PEEP: 12  PS: 40  ITime: 1  MAP: 15  PC: 10  PIP: 23              ***GI***  [x] NPO after MN for ECMO decannulation   [x] Protonix    Bowel regimen with Miralax and senna       ***Renal***  ERIC, renal following  Restarted CVVHD yesterday   Even fluid goal  Continue to monitor I/Os, BUN/Creatinine.   Replete lytes PRN  De Dios present      ***ID***  No active antibiotic coverage      ***Endocrine***  [x]  DM2: HbA1c 7.3%                - [x] Insulin gtt              - Need tight glycemic control to prevent wound infection.    ***GOC***  Next family meeting 12/13 at 1:30PM          Care Plan discussed with the ICU care team. Patient remained critical, at risk for life threatening decompensation.     I have spent 30 minutes providing critical care management to this patient.      By signing my name below, I, Leela Avila, attest that this documentation has been prepared under the direction and in the presence of Yusef Millan MD.  Electronically signed: Brian Oakes, 12-07-22 @ 19:44    I, Monty Holbrook, personally performed the services described in this documentation. all medical record entries made by the scribe were at my direction and in my presence. I have reviewed the chart and agree that the record reflects my personal performance and is accurate and complete  Electronically signed: Yusef Millan MD. Patient seen and examined at the bedside.    Remained critically ill on continuous ICU monitoring.    OBJECTIVE:  Vital Signs Last 24 Hrs  T(C): 36.5 (07 Dec 2022 16:00), Max: 36.5 (07 Dec 2022 12:00)  T(F): 97.7 (07 Dec 2022 16:00), Max: 97.7 (07 Dec 2022 12:00)  HR: 83 (07 Dec 2022 19:15) (72 - 103)  BP: --  BP(mean): --  RR: 14 (07 Dec 2022 19:15) (8 - 20)  SpO2: 100% (07 Dec 2022 19:15) (100% - 100%)    Parameters below as of 07 Dec 2022 16:00  Patient On (Oxygen Delivery Method): ventilator          Physical Exam:   General: Intubated, multiple lines gtt  Neurology: Sedated, off sedation follows simple commands, COBB x4  Eyes: bilateral pupils equal and reactive   ENT/Neck: +ETT midline, Neck supple, trachea midline, No JVD   Respiratory: rhonchi bilaterally   CV: S1S2, no murmurs        [x] Sinus rhythm  Abdominal: Softly distended,+BS   Extremities: 1+ pedal edema noted, + peripheral pulses palpable, warm  Skin: No Rashes, Hematoma, Ecchymosis                      ============================I/O===========================   I&O's Detail    06 Dec 2022 07:01  -  07 Dec 2022 07:00  --------------------------------------------------------  IN:    Argatroban: 8 mL    Dexmedetomidine: 765.6 mL    Enteral Tube Flush: 150 mL    Insulin: 38 mL    IV PiggyBack: 350 mL    Norepinephrine: 92 mL    PRBCs (Packed Red Blood Cells): 300 mL    sodium chloride 0.9%: 110 mL    Vital1.5: 400 mL  Total IN: 2213.6 mL    OUT:    Indwelling Catheter - Urethral (mL): 221 mL    Other (mL): 2339 mL    Propofol: 0 mL  Total OUT: 2560 mL    Total NET: -346.4 mL      07 Dec 2022 07:01  -  07 Dec 2022 19:44  --------------------------------------------------------  IN:    Argatroban: 0.8 mL    Argatroban: 2.5 mL    Argatroban: 3.5 mL    Dexmedetomidine: 410.7 mL    Insulin: 52 mL    IV PiggyBack: 50 mL    Norepinephrine: 26.1 mL    sodium chloride 0.9%: 60 mL    Vital1.5: 645 mL  Total IN: 1250.6 mL    OUT:    Indwelling Catheter - Urethral (mL): 40 mL    Other (mL): 1515 mL    Propofol: 0 mL  Total OUT: 1555 mL    Total NET: -304.4 mL        ============================ LABS =========================                        8.3    12.78 )-----------( 159      ( 07 Dec 2022 01:33 )             25.8     12-07    139  |  102  |  40<H>  ----------------------------<  131<H>  3.6   |  23  |  1.91<H>    Ca    8.8      07 Dec 2022 01:32  Phos  4.1     12-07  Mg     2.6     12-07    TPro  6.3  /  Alb  3.1<L>  /  TBili  1.1  /  DBili  x   /  AST  64<H>  /  ALT  9<L>  /  AlkPhos  238<H>  12-07    LIVER FUNCTIONS - ( 07 Dec 2022 01:32 )  Alb: 3.1 g/dL / Pro: 6.3 g/dL / ALK PHOS: 238 U/L / ALT: 9 U/L / AST: 64 U/L / GGT: x           PT/INR - ( 07 Dec 2022 01:35 )   PT: 15.0 sec;   INR: 1.30 ratio         PTT - ( 07 Dec 2022 17:58 )  PTT:43.3 sec  ABG - ( 07 Dec 2022 17:43 )  pH, Arterial: 7.40  pH, Blood: x     /  pCO2: 41    /  pO2: 178   / HCO3: 25    / Base Excess: 0.5   /  SaO2: 98.6                ======================Micro/Rad/Cardio=================  Culture: Reviewed   CXR: Reviewed  Echo: Reviewed  ======================================================  PAST MEDICAL & SURGICAL HISTORY:      ======================================================  Assessment:  63 y/o male with PMH of CABG, DM, HTN, HLD presenting as transfer from [place] for c/f STEMI. PTA arrival received 325 aspirin, 600 plavix, and no heparin drip started. Around 1:30 am patient had multiple episodes of non-bloody diarrhea and emesis with associated abdominal pain and chest pain, unable to localize, non-radiating, with associated SOB and no associated palpitations or diaphoresis. No recent fevers/chills, cough, rashes, or changes in urination. Does report mild diffuse back pain; started 5 days ago in setting on injury while walking and lifting objects. Denies focal weakness, numbness/tingling, or LOC due does report mild dizziness.    acute rspiratory distress , cardiogenic shock s/p [facility] ECMO 11/25  Hemodynamic instability  Hypovolemia  Acute blood loss anemia  acute pancreatitis      ======================================================  Plan:   ***Neuro***  CT head showed 4mm subdural hematoma 11/26: repeat CT head unchanged 12/01  Plan for additional Repeat CT for Pancreatitis assessment   [x] Sedated with [x] Precedex   Post operative neuro assessment   PRN Dilaudid for pain      ***Cardiovascular***  12/3 TTE: EF 30-35%, moderate-severe LV dysfunction  Invasive hemodynamic monitoring, assess perfusion indices   12/7 TTE turndown EF 30-40%, normal RV, MR mod   SR / CVP 8/ MAP 64/ Hct 25/ Lactate 0.9  [x] Levophed currently off   [x] L Femoral IABP put in today with good augmentation @ 1:1 [x] ECMO- 3300 rpm, 3.52 flow => plan to decannulate tomorrow    Continuos reassessment of hemodynamics   [x] Statin  [x] AC Therapy with Argatroban gtt for ECMO circuit (PTT 40-45)  cardiac cath today showed patent LIMA graft and other grafts occluded      ***Pulmonary***  Post op vent management   Titration of FiO2 and PEEP, follow SpO2, CXR, blood gasses   100% FiO2 on ECMO      Mode: AC/ CMV (Assist Control/ Continuous Mandatory Ventilation)  RR (machine): 14  FiO2: 40  PEEP: 12  PS: 40  ITime: 1  MAP: 15  PC: 10  PIP: 23              ***GI***  [x] NPO after MN for ECMO decannulation   [x] Protonix    Bowel regimen with Miralax and senna       ***Renal***  ERIC, renal following  Restarted CVVHD yesterday   Even fluid goal  Continue to monitor I/Os, BUN/Creatinine.   Replete lytes PRN  De Dios present      ***ID***  No active antibiotic coverage      ***Endocrine***  [x]  DM2: HbA1c 7.3%                - [x] Insulin gtt              - Need tight glycemic control to prevent wound infection.    ***GOC***  Next family meeting 12/13 at 1:30PM          Care Plan discussed with the ICU care team. Patient remained critical, at risk for life threatening decompensation.     I have spent 30 minutes providing critical care management to this patient.      By signing my name below, I, Leela Avila, attest that this documentation has been prepared under the direction and in the presence of Yusef Millan MD.  Electronically signed: Brian Oakes, 12-07-22 @ 19:44    I, Monty Holbrook, personally performed the services described in this documentation. all medical record entries made by the scribe were at my direction and in my presence. I have reviewed the chart and agree that the record reflects my personal performance and is accurate and complete  Electronically signed: Yusef Millan MD. Patient seen and examined at the bedside.    Remained critically ill on continuous ICU monitoring.    OBJECTIVE:  Vital Signs Last 24 Hrs  T(C): 36.5 (07 Dec 2022 16:00), Max: 36.5 (07 Dec 2022 12:00)  T(F): 97.7 (07 Dec 2022 16:00), Max: 97.7 (07 Dec 2022 12:00)  HR: 83 (07 Dec 2022 19:15) (72 - 103)  BP: --  BP(mean): --  RR: 14 (07 Dec 2022 19:15) (8 - 20)  SpO2: 100% (07 Dec 2022 19:15) (100% - 100%)    Parameters below as of 07 Dec 2022 16:00  Patient On (Oxygen Delivery Method): ventilator          Physical Exam:   General: Intubated, multiple lines gtt  Neurology: Sedated, off sedation follows simple commands, COBB x4  Eyes: bilateral pupils equal and reactive   ENT/Neck: +ETT midline, Neck supple, trachea midline, No JVD   Respiratory: rhonchi bilaterally   CV: S1S2, no murmurs        [x] Sinus rhythm  Abdominal: Softly distended,+BS   Extremities: 1+ pedal edema noted, + peripheral pulses palpable, warm  Skin: No Rashes, Hematoma, Ecchymosis                      ============================I/O===========================   I&O's Detail    06 Dec 2022 07:01  -  07 Dec 2022 07:00  --------------------------------------------------------  IN:    Argatroban: 8 mL    Dexmedetomidine: 765.6 mL    Enteral Tube Flush: 150 mL    Insulin: 38 mL    IV PiggyBack: 350 mL    Norepinephrine: 92 mL    PRBCs (Packed Red Blood Cells): 300 mL    sodium chloride 0.9%: 110 mL    Vital1.5: 400 mL  Total IN: 2213.6 mL    OUT:    Indwelling Catheter - Urethral (mL): 221 mL    Other (mL): 2339 mL    Propofol: 0 mL  Total OUT: 2560 mL    Total NET: -346.4 mL      07 Dec 2022 07:01  -  07 Dec 2022 19:44  --------------------------------------------------------  IN:    Argatroban: 0.8 mL    Argatroban: 2.5 mL    Argatroban: 3.5 mL    Dexmedetomidine: 410.7 mL    Insulin: 52 mL    IV PiggyBack: 50 mL    Norepinephrine: 26.1 mL    sodium chloride 0.9%: 60 mL    Vital1.5: 645 mL  Total IN: 1250.6 mL    OUT:    Indwelling Catheter - Urethral (mL): 40 mL    Other (mL): 1515 mL    Propofol: 0 mL  Total OUT: 1555 mL    Total NET: -304.4 mL        ============================ LABS =========================                        8.3    12.78 )-----------( 159      ( 07 Dec 2022 01:33 )             25.8     12-07    139  |  102  |  40<H>  ----------------------------<  131<H>  3.6   |  23  |  1.91<H>    Ca    8.8      07 Dec 2022 01:32  Phos  4.1     12-07  Mg     2.6     12-07    TPro  6.3  /  Alb  3.1<L>  /  TBili  1.1  /  DBili  x   /  AST  64<H>  /  ALT  9<L>  /  AlkPhos  238<H>  12-07    LIVER FUNCTIONS - ( 07 Dec 2022 01:32 )  Alb: 3.1 g/dL / Pro: 6.3 g/dL / ALK PHOS: 238 U/L / ALT: 9 U/L / AST: 64 U/L / GGT: x           PT/INR - ( 07 Dec 2022 01:35 )   PT: 15.0 sec;   INR: 1.30 ratio         PTT - ( 07 Dec 2022 17:58 )  PTT:43.3 sec  ABG - ( 07 Dec 2022 17:43 )  pH, Arterial: 7.40  pH, Blood: x     /  pCO2: 41    /  pO2: 178   / HCO3: 25    / Base Excess: 0.5   /  SaO2: 98.6                ======================Micro/Rad/Cardio=================  Culture: Reviewed   CXR: Reviewed  Echo: Reviewed  ======================================================  PAST MEDICAL & SURGICAL HISTORY:      ======================================================  Assessment:  63 y/o male with PMH of CABG, DM, HTN, HLD presenting as transfer from [place] for c/f STEMI. PTA arrival received 325 aspirin, 600 plavix, and no heparin drip started. Around 1:30 am patient had multiple episodes of non-bloody diarrhea and emesis with associated abdominal pain and chest pain, unable to localize, non-radiating, with associated SOB and no associated palpitations or diaphoresis. No recent fevers/chills, cough, rashes, or changes in urination. Does report mild diffuse back pain; started 5 days ago in setting on injury while walking and lifting objects. Denies focal weakness, numbness/tingling, or LOC due does report mild dizziness.    acute rspiratory distress ,   cardiogenic shock s/p [facility] ECMO 11/25  Hemodynamic instability  Hyperglycemia  Acute blood loss anemia  acute pancreatitis  encounter managemen IABP        ======================================================  Plan:   ***Neuro***  CT head showed 4mm subdural hematoma 11/26: repeat CT head unchanged 12/01  Plan for additional Repeat CT for Pancreatitis assessment   [x] Sedated with [x] Precedex   Post operative neuro assessment   PRN Dilaudid for pain      ***Cardiovascular***  12/3 TTE: EF 30-35%, moderate-severe LV dysfunction  Invasive hemodynamic monitoring, assess perfusion indices   12/7 TTE turndown EF 30-40%, normal RV, MR mod   SR / CVP 8/ MAP 64/ Hct 25/ Lactate 0.9  [x] Levophed currently off   [x] L Femoral IABP put in today with good augmentation @ 1:1 [x] ECMO- 3300 rpm, 3.52 flow => plan to decannulate tomorrow    Continuos reassessment of hemodynamics   [x] Statin  [x] AC Therapy with Argatroban gtt for ECMO circuit (PTT 40-45)  cardiac cath  showed patent LIMA graft and other grafts occluded      ***Pulmonary***  Post op vent management   Titration of FiO2 and PEEP, follow SpO2, CXR, blood gasses   100% FiO2 on ECMO  Mode: AC/ CMV (Assist Control/ Continuous Mandatory Ventilation)  RR (machine): 14  FiO2: 40  PEEP: 12  PS: 40  ITime: 1  MAP: 15  PC: 10  PIP: 23              ***GI***  [x] NPO after MN for ECMO decannulation   [x] Protonix    Bowel regimen with Miralax and senna       ***Renal***  ERIC, renal following  Restarted CVVHD yesterday   Even fluid goal  Continue to monitor I/Os, BUN/Creatinine.   Replete lytes PRN  De Dios present  ***ID***  No active antibiotic coverage      ***Endocrine***  [x]  DM2: HbA1c 7.3%                - [x] Insulin gtt              - Need tight glycemic control to prevent wound infection.    ***GOC***  Next family meeting 12/13 at 1:30PM    Care Plan discussed with the ICU care team. Patient remained critical, at risk for life threatening decompensation.     I have spent 30 minutes providing critical care management to this patient.  By signing my name below, I, Leela Avila, attest that this documentation has been prepared under the direction and in the presence of Yusef Millan MD.  Electronically signed: Brian Oakes, 12-07-22 @ 19:44    I, Monty Holbrook, personally performed the services described in this documentation. all medical record entries made by the scribe were at my direction and in my presence. I have reviewed the chart and agree that the record reflects my personal performance and is accurate and complete  Electronically signed: Yusef Millan MD. Patient seen and examined at the bedside.    Remained critically ill on continuous ICU monitoring.    OBJECTIVE:  Vital Signs Last 24 Hrs  T(C): 36.5 (07 Dec 2022 16:00), Max: 36.5 (07 Dec 2022 12:00)  T(F): 97.7 (07 Dec 2022 16:00), Max: 97.7 (07 Dec 2022 12:00)  HR: 83 (07 Dec 2022 19:15) (72 - 103)  BP: --  BP(mean): --  RR: 14 (07 Dec 2022 19:15) (8 - 20)  SpO2: 100% (07 Dec 2022 19:15) (100% - 100%)    Parameters below as of 07 Dec 2022 16:00  Patient On (Oxygen Delivery Method): ventilator          Physical Exam:   General: Intubated, multiple lines gtt  Neurology: Sedated, off sedation follows simple commands, COBB x4  Eyes: bilateral pupils equal and reactive   ENT/Neck: +ETT midline, Neck supple, trachea midline, No JVD   Respiratory: rhonchi bilaterally   CV: S1S2, no murmurs        [x] Sinus rhythm  Abdominal: Softly distended,+BS   Extremities: 1+ pedal edema noted, + peripheral pulses palpable, warm  Skin: No Rashes, Hematoma, Ecchymosis                      ============================I/O===========================   I&O's Detail    06 Dec 2022 07:01  -  07 Dec 2022 07:00  --------------------------------------------------------  IN:    Argatroban: 8 mL    Dexmedetomidine: 765.6 mL    Enteral Tube Flush: 150 mL    Insulin: 38 mL    IV PiggyBack: 350 mL    Norepinephrine: 92 mL    PRBCs (Packed Red Blood Cells): 300 mL    sodium chloride 0.9%: 110 mL    Vital1.5: 400 mL  Total IN: 2213.6 mL    OUT:    Indwelling Catheter - Urethral (mL): 221 mL    Other (mL): 2339 mL    Propofol: 0 mL  Total OUT: 2560 mL    Total NET: -346.4 mL      07 Dec 2022 07:01  -  07 Dec 2022 19:44  --------------------------------------------------------  IN:    Argatroban: 0.8 mL    Argatroban: 2.5 mL    Argatroban: 3.5 mL    Dexmedetomidine: 410.7 mL    Insulin: 52 mL    IV PiggyBack: 50 mL    Norepinephrine: 26.1 mL    sodium chloride 0.9%: 60 mL    Vital1.5: 645 mL  Total IN: 1250.6 mL    OUT:    Indwelling Catheter - Urethral (mL): 40 mL    Other (mL): 1515 mL    Propofol: 0 mL  Total OUT: 1555 mL    Total NET: -304.4 mL        ============================ LABS =========================                        8.3    12.78 )-----------( 159      ( 07 Dec 2022 01:33 )             25.8     12-07    139  |  102  |  40<H>  ----------------------------<  131<H>  3.6   |  23  |  1.91<H>    Ca    8.8      07 Dec 2022 01:32  Phos  4.1     12-07  Mg     2.6     12-07    TPro  6.3  /  Alb  3.1<L>  /  TBili  1.1  /  DBili  x   /  AST  64<H>  /  ALT  9<L>  /  AlkPhos  238<H>  12-07    LIVER FUNCTIONS - ( 07 Dec 2022 01:32 )  Alb: 3.1 g/dL / Pro: 6.3 g/dL / ALK PHOS: 238 U/L / ALT: 9 U/L / AST: 64 U/L / GGT: x           PT/INR - ( 07 Dec 2022 01:35 )   PT: 15.0 sec;   INR: 1.30 ratio         PTT - ( 07 Dec 2022 17:58 )  PTT:43.3 sec  ABG - ( 07 Dec 2022 17:43 )  pH, Arterial: 7.40  pH, Blood: x     /  pCO2: 41    /  pO2: 178   / HCO3: 25    / Base Excess: 0.5   /  SaO2: 98.6                ======================Micro/Rad/Cardio=================  Culture: Reviewed   CXR: Reviewed  Echo: Reviewed  ======================================================  PAST MEDICAL & SURGICAL HISTORY:      ======================================================  Assessment:  63 y/o male with PMH of CABG, DM, HTN, HLD presenting as transfer from [place] for c/f STEMI. PTA arrival received 325 aspirin, 600 plavix, and no heparin drip started. Around 1:30 am patient had multiple episodes of non-bloody diarrhea and emesis with associated abdominal pain and chest pain, unable to localize, non-radiating, with associated SOB and no associated palpitations or diaphoresis. No recent fevers/chills, cough, rashes, or changes in urination. Does report mild diffuse back pain; started 5 days ago in setting on injury while walking and lifting objects. Denies focal weakness, numbness/tingling, or LOC due does report mild dizziness.    acute rspiratory distress ,   cardiogenic shock s/p [facility] ECMO 11/25  Hemodynamic instability  Hyperglycemia  Acute blood loss anemia  acute pancreatitis  encounter managemen IABP        ======================================================  Plan:   ***Neuro***  CT head showed 4mm subdural hematoma 11/26: repeat CT head unchanged 12/01  Plan for additional Repeat CT for Pancreatitis assessment   [x] Sedated with [x] Precedex   Post operative neuro assessment   PRN Dilaudid for pain      ***Cardiovascular***  12/3 TTE: EF 30-35%, moderate-severe LV dysfunction  Invasive hemodynamic monitoring, assess perfusion indices   12/7 TTE turndown EF 30-40%, normal RV, MR mod   SR / CVP 8/ MAP 64/ Hct 25/ Lactate 0.9  [x] Levophed currently off   [x] L Femoral IABP put in today with good augmentation @ 1:1 [x] ECMO- 3300 rpm, 3.52 flow => plan to decannulate tomorrow    Continuos reassessment of hemodynamics   [x] Statin  [x] AC Therapy with Argatroban gtt for ECMO circuit (PTT 40-45)  cardiac cath  showed patent LIMA graft and other grafts occluded      ***Pulmonary***  Post op vent management   Titration of FiO2 and PEEP, follow SpO2, CXR, blood gasses   100% FiO2 on ECMO  Mode: AC/ CMV (Assist Control/ Continuous Mandatory Ventilation)  RR (machine): 14  FiO2: 40  PEEP: 12  PS: 40  ITime: 1  MAP: 15  PC: 10  PIP: 23              ***GI***  [x] NPO after MN for ECMO decannulation   [x] Protonix    Bowel regimen with Miralax and senna       ***Renal***  ERIC, renal following  Restarted CVVHD yesterday   Even fluid goal  Continue to monitor I/Os, BUN/Creatinine.   Replete lytes PRN  De Dios present  ***ID***  No active antibiotic coverage      ***Endocrine***  [x]  DM2: HbA1c 7.3%                - [x] Insulin gtt              - Need tight glycemic control to prevent wound infection.    ***GOC***  Next family meeting 12/13 at 1:30PM    Care Plan discussed with the ICU care team. Patient remained critical, at risk for life threatening decompensation.     I have spent 30 minutes providing critical care management to this patient.  By signing my name below, I, Leela Avila, attest that this documentation has been prepared under the direction and in the presence of Yusef Millan MD.  Electronically signed: Brian Oakes, 12-07-22 @ 19:44    I, Monty Holbrook, personally performed the services described in this documentation. all medical record entries made by the scribe were at my direction and in my presence. I have reviewed the chart and agree that the record reflects my personal performance and is accurate and complete  Electronically signed: Yusef Millan MD. Patient seen and examined at the bedside.    Remained critically ill on continuous ICU monitoring.    OBJECTIVE:  Vital Signs Last 24 Hrs  T(C): 36.5 (07 Dec 2022 16:00), Max: 36.5 (07 Dec 2022 12:00)  T(F): 97.7 (07 Dec 2022 16:00), Max: 97.7 (07 Dec 2022 12:00)  HR: 83 (07 Dec 2022 19:15) (72 - 103)  BP: --  BP(mean): --  RR: 14 (07 Dec 2022 19:15) (8 - 20)  SpO2: 100% (07 Dec 2022 19:15) (100% - 100%)    Parameters below as of 07 Dec 2022 16:00  Patient On (Oxygen Delivery Method): ventilator          Physical Exam:   General: Intubated, multiple lines gtt  Neurology: Sedated, off sedation follows simple commands, COBB x4  Eyes: bilateral pupils equal and reactive   ENT/Neck: +ETT midline, Neck supple, trachea midline, No JVD   Respiratory: rhonchi bilaterally   CV: S1S2, no murmurs        [x] Sinus rhythm  Abdominal: Softly distended,+BS   Extremities: 1+ pedal edema noted, + peripheral pulses palpable, warm  Skin: No Rashes, Hematoma, Ecchymosis                      ============================I/O===========================   I&O's Detail    06 Dec 2022 07:01  -  07 Dec 2022 07:00  --------------------------------------------------------  IN:    Argatroban: 8 mL    Dexmedetomidine: 765.6 mL    Enteral Tube Flush: 150 mL    Insulin: 38 mL    IV PiggyBack: 350 mL    Norepinephrine: 92 mL    PRBCs (Packed Red Blood Cells): 300 mL    sodium chloride 0.9%: 110 mL    Vital1.5: 400 mL  Total IN: 2213.6 mL    OUT:    Indwelling Catheter - Urethral (mL): 221 mL    Other (mL): 2339 mL    Propofol: 0 mL  Total OUT: 2560 mL    Total NET: -346.4 mL      07 Dec 2022 07:01  -  07 Dec 2022 19:44  --------------------------------------------------------  IN:    Argatroban: 0.8 mL    Argatroban: 2.5 mL    Argatroban: 3.5 mL    Dexmedetomidine: 410.7 mL    Insulin: 52 mL    IV PiggyBack: 50 mL    Norepinephrine: 26.1 mL    sodium chloride 0.9%: 60 mL    Vital1.5: 645 mL  Total IN: 1250.6 mL    OUT:    Indwelling Catheter - Urethral (mL): 40 mL    Other (mL): 1515 mL    Propofol: 0 mL  Total OUT: 1555 mL    Total NET: -304.4 mL        ============================ LABS =========================                        8.3    12.78 )-----------( 159      ( 07 Dec 2022 01:33 )             25.8     12-07    139  |  102  |  40<H>  ----------------------------<  131<H>  3.6   |  23  |  1.91<H>    Ca    8.8      07 Dec 2022 01:32  Phos  4.1     12-07  Mg     2.6     12-07    TPro  6.3  /  Alb  3.1<L>  /  TBili  1.1  /  DBili  x   /  AST  64<H>  /  ALT  9<L>  /  AlkPhos  238<H>  12-07    LIVER FUNCTIONS - ( 07 Dec 2022 01:32 )  Alb: 3.1 g/dL / Pro: 6.3 g/dL / ALK PHOS: 238 U/L / ALT: 9 U/L / AST: 64 U/L / GGT: x           PT/INR - ( 07 Dec 2022 01:35 )   PT: 15.0 sec;   INR: 1.30 ratio         PTT - ( 07 Dec 2022 17:58 )  PTT:43.3 sec  ABG - ( 07 Dec 2022 17:43 )  pH, Arterial: 7.40  pH, Blood: x     /  pCO2: 41    /  pO2: 178   / HCO3: 25    / Base Excess: 0.5   /  SaO2: 98.6                ======================Micro/Rad/Cardio=================  Culture: Reviewed   CXR: Reviewed  Echo: Reviewed  ======================================================  PAST MEDICAL & SURGICAL HISTORY:      ======================================================  Assessment:  63 y/o male with PMH of CABG, DM, HTN, HLD presenting as transfer from [place] for c/f STEMI. PTA arrival received 325 aspirin, 600 plavix, and no heparin drip started. Around 1:30 am patient had multiple episodes of non-bloody diarrhea and emesis with associated abdominal pain and chest pain, unable to localize, non-radiating, with associated SOB and no associated palpitations or diaphoresis. No recent fevers/chills, cough, rashes, or changes in urination. Does report mild diffuse back pain; started 5 days ago in setting on injury while walking and lifting objects. Denies focal weakness, numbness/tingling, or LOC due does report mild dizziness.    acute rspiratory distress ,   cardiogenic shock s/p [facility] ECMO 11/25  Hemodynamic instability  Hyperglycemia  Acute blood loss anemia  acute pancreatitis  encounter managemen IABP        ======================================================  Plan:   ***Neuro***  CT head showed 4mm subdural hematoma 11/26: repeat CT head unchanged 12/01  Plan for additional Repeat CT for Pancreatitis assessment   [x] Sedated with [x] Precedex   Post operative neuro assessment   PRN Dilaudid for pain      ***Cardiovascular***  12/3 TTE: EF 30-35%, moderate-severe LV dysfunction  Invasive hemodynamic monitoring, assess perfusion indices   12/7 TTE turndown EF 30-40%, normal RV, MR mod   SR / CVP 8/ MAP 64/ Hct 25/ Lactate 0.9  [x] Levophed currently off   [x] L Femoral IABP put in today with good augmentation @ 1:1 [x] ECMO- 3300 rpm, 3.52 flow => plan to decannulate tomorrow    Continuos reassessment of hemodynamics   [x] Statin  [x] AC Therapy with Argatroban gtt for ECMO circuit (PTT 40-45)  cardiac cath  showed patent LIMA graft and other grafts occluded      ***Pulmonary***  Post op vent management   Titration of FiO2 and PEEP, follow SpO2, CXR, blood gasses   100% FiO2 on ECMO  Mode: AC/ CMV (Assist Control/ Continuous Mandatory Ventilation)  RR (machine): 14  FiO2: 40  PEEP: 12  PS: 40  ITime: 1  MAP: 15  PC: 10  PIP: 23              ***GI***  [x] NPO after MN for ECMO decannulation   [x] Protonix    Bowel regimen with Miralax and senna       ***Renal***  ERIC, renal following  Restarted CVVHD yesterday   Even fluid goal  Continue to monitor I/Os, BUN/Creatinine.   Replete lytes PRN  De Dios present  ***ID***  No active antibiotic coverage      ***Endocrine***  [x]  DM2: HbA1c 7.3%                - [x] Insulin gtt              - Need tight glycemic control to prevent wound infection.    ***GOC***  Next family meeting 12/13 at 1:30PM    Care Plan discussed with the ICU care team. Patient remained critical, at risk for life threatening decompensation.     I have spent 30 minutes providing critical care management to this patient.  By signing my name below, I, Leela Avila, attest that this documentation has been prepared under the direction and in the presence of Yusef Millan MD.  Electronically signed: Brian Oakes, 12-07-22 @ 19:44    I, Monty Holbrook, personally performed the services described in this documentation. all medical record entries made by the scribe were at my direction and in my presence. I have reviewed the chart and agree that the record reflects my personal performance and is accurate and complete  Electronically signed: Yusef Millan MD.

## 2022-12-07 NOTE — PROGRESS NOTE ADULT - SUBJECTIVE AND OBJECTIVE BOX
CARDIOLOGY CONSULT PROGRESS NOTE  EDUARDO REAL  [id number]    INTERVAL EVENTS:  - tele:   -     ROS:  Negative, except as above.    MEDICATIONS  (STANDING):  argatroban Infusion 0.075 MICROgram(s)/kG/Min (0.42 mL/Hr) IV Continuous <Continuous>  artificial  tears Solution 1 Drop(s) Both EYES four times a day  atorvastatin 40 milliGRAM(s) Oral at bedtime  chlorhexidine 0.12% Liquid 15 milliLiter(s) Oral Mucosa every 12 hours  chlorhexidine 2% Cloths 1 Application(s) Topical <User Schedule>  CRRT Treatment    <Continuous>  dexMEDEtomidine Infusion 1.5 MICROgram(s)/kG/Hr (34.8 mL/Hr) IV Continuous <Continuous>  dextrose 50% Injectable 50 milliLiter(s) IV Push every 15 minutes  HYDROmorphone  Injectable 0.5 milliGRAM(s) IV Push every 6 hours  insulin regular Infusion 6 Unit(s)/Hr (6 mL/Hr) IV Continuous <Continuous>  norepinephrine Infusion 0.05 MICROgram(s)/kG/Min (8.71 mL/Hr) IV Continuous <Continuous>  pantoprazole  Injectable 40 milliGRAM(s) IV Push two times a day  polyethylene glycol 3350 17 Gram(s) Oral two times a day  PrismaSATE Dialysate BK 0 / 3.5 5000 milliLiter(s) (2000 mL/Hr) CRRT <Continuous>  PrismaSOL Filtration BGK 4 / 2.5 5000 milliLiter(s) (800 mL/Hr) CRRT <Continuous>  PrismaSOL Filtration BGK 4 / 2.5 5000 milliLiter(s) (200 mL/Hr) CRRT <Continuous>  propofol Infusion 17.94 MICROgram(s)/kG/Min (10 mL/Hr) IV Continuous <Continuous>  senna 2 Tablet(s) Oral at bedtime  sodium chloride 0.9%. 1000 milliLiter(s) (5 mL/Hr) IV Continuous <Continuous>    MEDICATIONS  (PRN):  sodium chloride 0.9% lock flush 10 milliLiter(s) IV Push every 1 hour PRN Pre/post blood products, medications, blood draw, and to maintain line patency    Allergies  No Known Allergies    P/E:  Adult Advanced Hemodynamics Last 24 Hrs  CVP(mm Hg): 5 (07 Dec 2022 07:00) (2 - 24)    Vital Signs Last 24 Hrs  T(C): 36.4 (07 Dec 2022 04:00), Max: 36.9 (06 Dec 2022 16:00)  T(F): 97.5 (07 Dec 2022 04:00), Max: 98.4 (06 Dec 2022 16:00)  HR: 86 (07 Dec 2022 07:00) (81 - 184)  RR: 8 (07 Dec 2022 07:00) (8 - 25)  SpO2: 100% (07 Dec 2022 07:00) (100% - 100%)    Patient On (Oxygen Delivery Method): ventilator  O2 Concentration (%): 40    Daily Weight in k (07 Dec 2022 00:00)    I&O's Detail  06 Dec 2022 07:01  -  07 Dec 2022 07:00  IN:    Argatroban: 8 mL    Dexmedetomidine: 765.6 mL    Enteral Tube Flush: 150 mL    Insulin: 38 mL    IV PiggyBack: 350 mL    Norepinephrine: 92 mL    PRBCs (Packed Red Blood Cells): 300 mL    sodium chloride 0.9%: 110 mL    Vital1.5: 400 mL  Total IN: 2213.6 mL  OUT:    Indwelling Catheter - Urethral (mL): 221 mL    Other (mL): 2221 mL    Propofol: 0 mL  Total OUT: 2442 mL  Total NET: -228.4 mL    07 Dec 2022 07:01  -  07 Dec 2022 07:52  IN:    Argatroban: 0.4 mL    Dexmedetomidine: 34.8 mL    Insulin: 3 mL    sodium chloride 0.9%: 5 mL    Vital1.5: 40 mL  Total IN: 83.2 mL  OUT:    Indwelling Catheter - Urethral (mL): 5 mL    Norepinephrine: 0 mL    Propofol: 0 mL  Total OUT: 5 mL  Total NET: 78.2 mL    I&O's Summary  06 Dec 2022 07:01  -  07 Dec 2022 07:00  --------------------------------------------------------  IN: 2213.6 mL / OUT: 2442 mL / NET: -228.4 mL    07 Dec 2022 07:01  -  07 Dec 2022 07:52  --------------------------------------------------------  IN: 83.2 mL / OUT: 5 mL / NET: 78.2 mL    - gen: intubated/sedated, laying in hospital bed, rousable and follows commands  - HEENT: ETT in place, MMM  - heart: RRR, systolic murmur, S1/S2  - lungs: mechanical BS  - abd: distended, soft, NT, hypoactive BS  - ext: WWP, PPP, 2+ MESERET, ECMO cannulas in b/l groins w/ intact pulses    RELEVANT RECENT LABS/IMAGING/STUDIES:                        8.3     )-----------( 159      ( 07 Dec 2022 01:33 )             25.8     12    139  |  102  |  40<H>  ----------------------------<  131<H>  3.6   |  23  |  1.91<H>    Ca    8.8      07 Dec 2022 01:32  Phos  4.1     12  Mg     2.6         TPro  6.3  /  Alb  3.1<L>  /  TBili  1.1  /  DBili  x   /  AST  64<H>  /  ALT  9<L>  /  AlkPhos  238<H>  12    LIVER FUNCTIONS - ( 07 Dec 2022 01:32 )  Alb: 3.1 g/dL / Pro: 6.3 g/dL / ALK PHOS: 238 U/L / ALT: 9 U/L / AST: 64 U/L / GGT: x           PT/INR - ( 07 Dec 2022 01:35 )   PT: 15.0 sec;   INR: 1.30 ratio       PTT - ( 07 Dec 2022 01:35 )  PTT:40.9 sec    ABG - ( 07 Dec 2022 07:00 )  pH, Arterial: 7.35  pH, Blood: x     /  pCO2: 44    /  pO2: 424   / HCO3: 24    / Base Excess: -1.3  /  SaO2: 99.7   CARDIOLOGY CONSULT PROGRESS NOTE  EDUARDO REAL  [id number]    INTERVAL EVENTS:  - tele:   -     ROS:  Negative, except as above.    MEDICATIONS  (STANDING):  argatroban Infusion 0.075 MICROgram(s)/kG/Min (0.42 mL/Hr) IV Continuous <Continuous>  artificial  tears Solution 1 Drop(s) Both EYES four times a day  atorvastatin 40 milliGRAM(s) Oral at bedtime  chlorhexidine 0.12% Liquid 15 milliLiter(s) Oral Mucosa every 12 hours  chlorhexidine 2% Cloths 1 Application(s) Topical <User Schedule>  CRRT Treatment    <Continuous>  dexMEDEtomidine Infusion 1.5 MICROgram(s)/kG/Hr (34.8 mL/Hr) IV Continuous <Continuous>  dextrose 50% Injectable 50 milliLiter(s) IV Push every 15 minutes  HYDROmorphone  Injectable 0.5 milliGRAM(s) IV Push every 6 hours  insulin regular Infusion 6 Unit(s)/Hr (6 mL/Hr) IV Continuous <Continuous>  norepinephrine Infusion 0.05 MICROgram(s)/kG/Min (8.71 mL/Hr) IV Continuous <Continuous>  pantoprazole  Injectable 40 milliGRAM(s) IV Push two times a day  polyethylene glycol 3350 17 Gram(s) Oral two times a day  PrismaSATE Dialysate BK 0 / 3.5 5000 milliLiter(s) (2000 mL/Hr) CRRT <Continuous>  PrismaSOL Filtration BGK 4 / 2.5 5000 milliLiter(s) (800 mL/Hr) CRRT <Continuous>  PrismaSOL Filtration BGK 4 / 2.5 5000 milliLiter(s) (200 mL/Hr) CRRT <Continuous>  propofol Infusion 17.94 MICROgram(s)/kG/Min (10 mL/Hr) IV Continuous <Continuous>  senna 2 Tablet(s) Oral at bedtime  sodium chloride 0.9%. 1000 milliLiter(s) (5 mL/Hr) IV Continuous <Continuous>    MEDICATIONS  (PRN):  sodium chloride 0.9% lock flush 10 milliLiter(s) IV Push every 1 hour PRN Pre/post blood products, medications, blood draw, and to maintain line patency    Allergies  No Known Allergies    P/E:  Adult Advanced Hemodynamics Last 24 Hrs  CVP(mm Hg): 5 (07 Dec 2022 07:00) (2 - 24)    Vital Signs Last 24 Hrs  T(C): 36.4 (07 Dec 2022 04:00), Max: 36.9 (06 Dec 2022 16:00)  T(F): 97.5 (07 Dec 2022 04:00), Max: 98.4 (06 Dec 2022 16:00)  HR: 86 (07 Dec 2022 07:00) (81 - 184)  RR: 8 (07 Dec 2022 07:00) (8 - 25)  SpO2: 100% (07 Dec 2022 07:00) (100% - 100%)    Patient On (Oxygen Delivery Method): ventilator  O2 Concentration (%): 40    Daily Weight in k (07 Dec 2022 00:00)    I&O's Detail  06 Dec 2022 07:01  -  07 Dec 2022 07:00  IN:    Argatroban: 8 mL    Dexmedetomidine: 765.6 mL    Enteral Tube Flush: 150 mL    Insulin: 38 mL    IV PiggyBack: 350 mL    Norepinephrine: 92 mL    PRBCs (Packed Red Blood Cells): 300 mL    sodium chloride 0.9%: 110 mL    Vital1.5: 400 mL  Total IN: 2213.6 mL  OUT:    Indwelling Catheter - Urethral (mL): 221 mL    Other (mL): 2221 mL    Propofol: 0 mL  Total OUT: 2442 mL  Total NET: -228.4 mL    07 Dec 2022 07:01  -  07 Dec 2022 07:52  IN:    Argatroban: 0.4 mL    Dexmedetomidine: 34.8 mL    Insulin: 3 mL    sodium chloride 0.9%: 5 mL    Vital1.5: 40 mL  Total IN: 83.2 mL  OUT:    Indwelling Catheter - Urethral (mL): 5 mL    Norepinephrine: 0 mL    Propofol: 0 mL  Total OUT: 5 mL  Total NET: 78.2 mL    I&O's Summary  06 Dec 2022 07:01  -  07 Dec 2022 07:00  --------------------------------------------------------  IN: 2213.6 mL / OUT: 2442 mL / NET: -228.4 mL    07 Dec 2022 07:01  -  07 Dec 2022 07:52  --------------------------------------------------------  IN: 83.2 mL / OUT: 5 mL / NET: 78.2 mL    - gen: intubated/sedated, laying in hospital bed, rousable and follows commands  - HEENT: ETT in place, MMM  - heart: RRR, systolic murmur, S1/S2  - lungs: mechanical BS  - abd: distended, soft, NT, hypoactive BS  - ext: WWP, PPP, 2+ MESERET, ECMO cannulas in b/l groins w/ intact pulses    RELEVANT RECENT LABS/IMAGING/STUDIES:                        8.3     )-----------( 159      ( 07 Dec 2022 01:33 )             25.8     12    139  |  102  |  40<H>  ----------------------------<  131<H>  3.6   |  23  |  1.91<H>    Ca    8.8      07 Dec 2022 01:32  Phos  4.1     12  Mg     2.6         TPro  6.3  /  Alb  3.1<L>  /  TBili  1.1  /  DBili  x   /  AST  64<H>  /  ALT  9<L>  /  AlkPhos  238<H>  12    LIVER FUNCTIONS - ( 07 Dec 2022 01:32 )  Alb: 3.1 g/dL / Pro: 6.3 g/dL / ALK PHOS: 238 U/L / ALT: 9 U/L / AST: 64 U/L / GGT: x           PT/INR - ( 07 Dec 2022 01:35 )   PT: 15.0 sec;   INR: 1.30 ratio       PTT - ( 07 Dec 2022 01:35 )  PTT:40.9 sec    ABG - ( 07 Dec 2022 07:00 )  pH, Arterial: 7.35  pH, Blood: x     /  pCO2: 44    /  pO2: 424   / HCO3: 24    / Base Excess: -1.3  /  SaO2: 99.7   CARDIOLOGY CONSULT PROGRESS NOTE  EDUARDO REAL  [id number]    INTERVAL EVENTS:  - tele:   -     ROS:  Negative, except as above.    MEDICATIONS  (STANDING):  argatroban Infusion 0.075 MICROgram(s)/kG/Min (0.42 mL/Hr) IV Continuous <Continuous>  artificial  tears Solution 1 Drop(s) Both EYES four times a day  atorvastatin 40 milliGRAM(s) Oral at bedtime  chlorhexidine 0.12% Liquid 15 milliLiter(s) Oral Mucosa every 12 hours  chlorhexidine 2% Cloths 1 Application(s) Topical <User Schedule>  CRRT Treatment    <Continuous>  dexMEDEtomidine Infusion 1.5 MICROgram(s)/kG/Hr (34.8 mL/Hr) IV Continuous <Continuous>  dextrose 50% Injectable 50 milliLiter(s) IV Push every 15 minutes  HYDROmorphone  Injectable 0.5 milliGRAM(s) IV Push every 6 hours  insulin regular Infusion 6 Unit(s)/Hr (6 mL/Hr) IV Continuous <Continuous>  norepinephrine Infusion 0.05 MICROgram(s)/kG/Min (8.71 mL/Hr) IV Continuous <Continuous>  pantoprazole  Injectable 40 milliGRAM(s) IV Push two times a day  polyethylene glycol 3350 17 Gram(s) Oral two times a day  PrismaSATE Dialysate BK 0 / 3.5 5000 milliLiter(s) (2000 mL/Hr) CRRT <Continuous>  PrismaSOL Filtration BGK 4 / 2.5 5000 milliLiter(s) (800 mL/Hr) CRRT <Continuous>  PrismaSOL Filtration BGK 4 / 2.5 5000 milliLiter(s) (200 mL/Hr) CRRT <Continuous>  propofol Infusion 17.94 MICROgram(s)/kG/Min (10 mL/Hr) IV Continuous <Continuous>  senna 2 Tablet(s) Oral at bedtime  sodium chloride 0.9%. 1000 milliLiter(s) (5 mL/Hr) IV Continuous <Continuous>    MEDICATIONS  (PRN):  sodium chloride 0.9% lock flush 10 milliLiter(s) IV Push every 1 hour PRN Pre/post blood products, medications, blood draw, and to maintain line patency    Allergies  No Known Allergies    P/E:  Adult Advanced Hemodynamics Last 24 Hrs  CVP(mm Hg): 5 (07 Dec 2022 07:00) (2 - 24)    Vital Signs Last 24 Hrs  T(C): 36.4 (07 Dec 2022 04:00), Max: 36.9 (06 Dec 2022 16:00)  T(F): 97.5 (07 Dec 2022 04:00), Max: 98.4 (06 Dec 2022 16:00)  HR: 86 (07 Dec 2022 07:00) (81 - 184)  RR: 8 (07 Dec 2022 07:00) (8 - 25)  SpO2: 100% (07 Dec 2022 07:00) (100% - 100%)    Patient On (Oxygen Delivery Method): ventilator  O2 Concentration (%): 40    Daily Weight in k (07 Dec 2022 00:00)    I&O's Detail  06 Dec 2022 07:01  -  07 Dec 2022 07:00  IN:    Argatroban: 8 mL    Dexmedetomidine: 765.6 mL    Enteral Tube Flush: 150 mL    Insulin: 38 mL    IV PiggyBack: 350 mL    Norepinephrine: 92 mL    PRBCs (Packed Red Blood Cells): 300 mL    sodium chloride 0.9%: 110 mL    Vital1.5: 400 mL  Total IN: 2213.6 mL  OUT:    Indwelling Catheter - Urethral (mL): 221 mL    Other (mL): 2221 mL    Propofol: 0 mL  Total OUT: 2442 mL  Total NET: -228.4 mL    07 Dec 2022 07:01  -  07 Dec 2022 07:52  IN:    Argatroban: 0.4 mL    Dexmedetomidine: 34.8 mL    Insulin: 3 mL    sodium chloride 0.9%: 5 mL    Vital1.5: 40 mL  Total IN: 83.2 mL  OUT:    Indwelling Catheter - Urethral (mL): 5 mL    Norepinephrine: 0 mL    Propofol: 0 mL  Total OUT: 5 mL  Total NET: 78.2 mL    I&O's Summary  06 Dec 2022 07:01  -  07 Dec 2022 07:00  --------------------------------------------------------  IN: 2213.6 mL / OUT: 2442 mL / NET: -228.4 mL    07 Dec 2022 07:01  -  07 Dec 2022 07:52  --------------------------------------------------------  IN: 83.2 mL / OUT: 5 mL / NET: 78.2 mL    - gen: intubated/sedated, laying in hospital bed, rousable and follows commands  - HEENT: ETT in place, MMM  - heart: RRR, systolic murmur, S1/S2  - lungs: mechanical BS  - abd: distended, soft, NT, hypoactive BS  - ext: WWP, PPP, 2+ MESERET, ECMO cannulas in b/l groins w/ intact pulses    RELEVANT RECENT LABS/IMAGING/STUDIES:                        8.3     )-----------( 159      ( 07 Dec 2022 01:33 )             25.8     12    139  |  102  |  40<H>  ----------------------------<  131<H>  3.6   |  23  |  1.91<H>    Ca    8.8      07 Dec 2022 01:32  Phos  4.1     12  Mg     2.6         TPro  6.3  /  Alb  3.1<L>  /  TBili  1.1  /  DBili  x   /  AST  64<H>  /  ALT  9<L>  /  AlkPhos  238<H>  12    LIVER FUNCTIONS - ( 07 Dec 2022 01:32 )  Alb: 3.1 g/dL / Pro: 6.3 g/dL / ALK PHOS: 238 U/L / ALT: 9 U/L / AST: 64 U/L / GGT: x           PT/INR - ( 07 Dec 2022 01:35 )   PT: 15.0 sec;   INR: 1.30 ratio       PTT - ( 07 Dec 2022 01:35 )  PTT:40.9 sec    ABG - ( 07 Dec 2022 07:00 )  pH, Arterial: 7.35  pH, Blood: x     /  pCO2: 44    /  pO2: 424   / HCO3: 24    / Base Excess: -1.3  /  SaO2: 99.7   CARDIOLOGY CONSULT PROGRESS NOTE  EDUARDO REAL  [id number]    INTERVAL EVENTS:  - tele: NSR 80-100s, PVCs  - intubated/sedated, able to follow commands    ROS:  Negative, except as above.    MEDICATIONS  (STANDING):  argatroban Infusion 0.075 MICROgram(s)/kG/Min (0.42 mL/Hr) IV Continuous <Continuous>  artificial  tears Solution 1 Drop(s) Both EYES four times a day  atorvastatin 40 milliGRAM(s) Oral at bedtime  chlorhexidine 0.12% Liquid 15 milliLiter(s) Oral Mucosa every 12 hours  chlorhexidine 2% Cloths 1 Application(s) Topical <User Schedule>  CRRT Treatment    <Continuous>  dexMEDEtomidine Infusion 1.5 MICROgram(s)/kG/Hr (34.8 mL/Hr) IV Continuous <Continuous>  dextrose 50% Injectable 50 milliLiter(s) IV Push every 15 minutes  HYDROmorphone  Injectable 0.5 milliGRAM(s) IV Push every 6 hours  insulin regular Infusion 6 Unit(s)/Hr (6 mL/Hr) IV Continuous <Continuous>  norepinephrine Infusion 0.05 MICROgram(s)/kG/Min (8.71 mL/Hr) IV Continuous <Continuous>  pantoprazole  Injectable 40 milliGRAM(s) IV Push two times a day  polyethylene glycol 3350 17 Gram(s) Oral two times a day  PrismaSATE Dialysate BK 0 / 3.5 5000 milliLiter(s) (2000 mL/Hr) CRRT <Continuous>  PrismaSOL Filtration BGK 4 / 2.5 5000 milliLiter(s) (800 mL/Hr) CRRT <Continuous>  PrismaSOL Filtration BGK 4 / 2.5 5000 milliLiter(s) (200 mL/Hr) CRRT <Continuous>  propofol Infusion 17.94 MICROgram(s)/kG/Min (10 mL/Hr) IV Continuous <Continuous>  senna 2 Tablet(s) Oral at bedtime  sodium chloride 0.9%. 1000 milliLiter(s) (5 mL/Hr) IV Continuous <Continuous>    MEDICATIONS  (PRN):  sodium chloride 0.9% lock flush 10 milliLiter(s) IV Push every 1 hour PRN Pre/post blood products, medications, blood draw, and to maintain line patency    Allergies  No Known Allergies    P/E:  Adult Advanced Hemodynamics Last 24 Hrs  CVP(mm Hg): 5 (07 Dec 2022 07:00) (2 - 24)    Vital Signs Last 24 Hrs  T(C): 36.4 (07 Dec 2022 04:00), Max: 36.9 (06 Dec 2022 16:00)  T(F): 97.5 (07 Dec 2022 04:00), Max: 98.4 (06 Dec 2022 16:00)  HR: 86 (07 Dec 2022 07:00) (81 - 184)  RR: 8 (07 Dec 2022 07:00) (8 - 25)  SpO2: 100% (07 Dec 2022 07:00) (100% - 100%)    Patient On (Oxygen Delivery Method): ventilator  O2 Concentration (%): 40    Daily Weight in k (07 Dec 2022 00:00)    I&O's Detail  06 Dec 2022 07:01  -  07 Dec 2022 07:00  IN:    Argatroban: 8 mL    Dexmedetomidine: 765.6 mL    Enteral Tube Flush: 150 mL    Insulin: 38 mL    IV PiggyBack: 350 mL    Norepinephrine: 92 mL    PRBCs (Packed Red Blood Cells): 300 mL    sodium chloride 0.9%: 110 mL    Vital1.5: 400 mL  Total IN: 2213.6 mL  OUT:    Indwelling Catheter - Urethral (mL): 221 mL    Other (mL): 2221 mL    Propofol: 0 mL  Total OUT: 2442 mL  Total NET: -228.4 mL    07 Dec 2022 07:01  -  07 Dec 2022 07:52  IN:    Argatroban: 0.4 mL    Dexmedetomidine: 34.8 mL    Insulin: 3 mL    sodium chloride 0.9%: 5 mL    Vital1.5: 40 mL  Total IN: 83.2 mL  OUT:    Indwelling Catheter - Urethral (mL): 5 mL    Norepinephrine: 0 mL    Propofol: 0 mL  Total OUT: 5 mL  Total NET: 78.2 mL    I&O's Summary  06 Dec 2022 07:01  -  07 Dec 2022 07:00  --------------------------------------------------------  IN: 2213.6 mL / OUT: 2442 mL / NET: -228.4 mL    07 Dec 2022 07:01  -  07 Dec 2022 07:52  --------------------------------------------------------  IN: 83.2 mL / OUT: 5 mL / NET: 78.2 mL    - gen: intubated/sedated, laying in hospital bed, rousable and follows commands  - HEENT: ETT in place, MMM  - heart: RRR, systolic murmur, S1/S2  - lungs: mechanical BS  - abd: distended, soft, NT, hypoactive BS  - ext: WWP, PPP, 2+ MESERET, ECMO cannulas in b/l groins w/ intact pulses    RELEVANT RECENT LABS/IMAGING/STUDIES:                        8.3     )-----------( 159      ( 07 Dec 2022 01:33 )             25.8         139  |  102  |  40<H>  ----------------------------<  131<H>  3.6   |  23  |  1.91<H>    Ca    8.8      07 Dec 2022 01:32  Phos  4.1       Mg     2.6         TPro  6.3  /  Alb  3.1<L>  /  TBili  1.1  /  DBili  x   /  AST  64<H>  /  ALT  9<L>  /  AlkPhos  238<H>      LIVER FUNCTIONS - ( 07 Dec 2022 01:32 )  Alb: 3.1 g/dL / Pro: 6.3 g/dL / ALK PHOS: 238 U/L / ALT: 9 U/L / AST: 64 U/L / GGT: x           PT/INR - ( 07 Dec 2022 01:35 )   PT: 15.0 sec;   INR: 1.30 ratio       PTT - ( 07 Dec 2022 01:35 )  PTT:40.9 sec    ABG - ( 07 Dec 2022 07:00 )  pH, Arterial: 7.35  pH, Blood: x     /  pCO2: 44    /  pO2: 424   / HCO3: 24    / Base Excess: -1.3  /  SaO2: 99.7   CARDIOLOGY CONSULT PROGRESS NOTE  EDUARDO REAL  MRN-[id number]    INTERVAL EVENTS:  - tele: NSR 80-100s, PVCs  - intubated/sedated, able to follow commands    ROS:  Negative, except as above.    MEDICATIONS  (STANDING):  argatroban Infusion 0.075 MICROgram(s)/kG/Min (0.42 mL/Hr) IV Continuous <Continuous>  artificial  tears Solution 1 Drop(s) Both EYES four times a day  atorvastatin 40 milliGRAM(s) Oral at bedtime  chlorhexidine 0.12% Liquid 15 milliLiter(s) Oral Mucosa every 12 hours  chlorhexidine 2% Cloths 1 Application(s) Topical <User Schedule>  CRRT Treatment    <Continuous>  dexMEDEtomidine Infusion 1.5 MICROgram(s)/kG/Hr (34.8 mL/Hr) IV Continuous <Continuous>  dextrose 50% Injectable 50 milliLiter(s) IV Push every 15 minutes  HYDROmorphone  Injectable 0.5 milliGRAM(s) IV Push every 6 hours  insulin regular Infusion 6 Unit(s)/Hr (6 mL/Hr) IV Continuous <Continuous>  norepinephrine Infusion 0.05 MICROgram(s)/kG/Min (8.71 mL/Hr) IV Continuous <Continuous>  pantoprazole  Injectable 40 milliGRAM(s) IV Push two times a day  polyethylene glycol 3350 17 Gram(s) Oral two times a day  PrismaSATE Dialysate BK 0 / 3.5 5000 milliLiter(s) (2000 mL/Hr) CRRT <Continuous>  PrismaSOL Filtration BGK 4 / 2.5 5000 milliLiter(s) (800 mL/Hr) CRRT <Continuous>  PrismaSOL Filtration BGK 4 / 2.5 5000 milliLiter(s) (200 mL/Hr) CRRT <Continuous>  propofol Infusion 17.94 MICROgram(s)/kG/Min (10 mL/Hr) IV Continuous <Continuous>  senna 2 Tablet(s) Oral at bedtime  sodium chloride 0.9%. 1000 milliLiter(s) (5 mL/Hr) IV Continuous <Continuous>    MEDICATIONS  (PRN):  sodium chloride 0.9% lock flush 10 milliLiter(s) IV Push every 1 hour PRN Pre/post blood products, medications, blood draw, and to maintain line patency    Allergies  No Known Allergies    P/E:  Adult Advanced Hemodynamics Last 24 Hrs  CVP(mm Hg): 5 (07 Dec 2022 07:00) (2 - 24)    Vital Signs Last 24 Hrs  T(C): 36.4 (07 Dec 2022 04:00), Max: 36.9 (06 Dec 2022 16:00)  T(F): 97.5 (07 Dec 2022 04:00), Max: 98.4 (06 Dec 2022 16:00)  HR: 86 (07 Dec 2022 07:00) (81 - 184)  RR: 8 (07 Dec 2022 07:00) (8 - 25)  SpO2: 100% (07 Dec 2022 07:00) (100% - 100%)    Patient On (Oxygen Delivery Method): ventilator  O2 Concentration (%): 40    Daily Weight in k (07 Dec 2022 00:00)    I&O's Detail  06 Dec 2022 07:01  -  07 Dec 2022 07:00  IN:    Argatroban: 8 mL    Dexmedetomidine: 765.6 mL    Enteral Tube Flush: 150 mL    Insulin: 38 mL    IV PiggyBack: 350 mL    Norepinephrine: 92 mL    PRBCs (Packed Red Blood Cells): 300 mL    sodium chloride 0.9%: 110 mL    Vital1.5: 400 mL  Total IN: 2213.6 mL  OUT:    Indwelling Catheter - Urethral (mL): 221 mL    Other (mL): 2221 mL    Propofol: 0 mL  Total OUT: 2442 mL  Total NET: -228.4 mL    07 Dec 2022 07:01  -  07 Dec 2022 07:52  IN:    Argatroban: 0.4 mL    Dexmedetomidine: 34.8 mL    Insulin: 3 mL    sodium chloride 0.9%: 5 mL    Vital1.5: 40 mL  Total IN: 83.2 mL  OUT:    Indwelling Catheter - Urethral (mL): 5 mL    Norepinephrine: 0 mL    Propofol: 0 mL  Total OUT: 5 mL  Total NET: 78.2 mL    I&O's Summary  06 Dec 2022 07:01  -  07 Dec 2022 07:00  --------------------------------------------------------  IN: 2213.6 mL / OUT: 2442 mL / NET: -228.4 mL    07 Dec 2022 07:01  -  07 Dec 2022 07:52  --------------------------------------------------------  IN: 83.2 mL / OUT: 5 mL / NET: 78.2 mL    - gen: intubated/sedated, laying in hospital bed, rousable and follows commands  - HEENT: ETT in place, MMM  - heart: RRR, systolic murmur, S1/S2  - lungs: mechanical BS  - abd: distended, soft, NT, hypoactive BS  - ext: WWP, PPP, 2+ MESERET, ECMO cannulas in b/l groins w/ intact pulses    RELEVANT RECENT LABS/IMAGING/STUDIES:                        8.3     )-----------( 159      ( 07 Dec 2022 01:33 )             25.8         139  |  102  |  40<H>  ----------------------------<  131<H>  3.6   |  23  |  1.91<H>    Ca    8.8      07 Dec 2022 01:32  Phos  4.1       Mg     2.6         TPro  6.3  /  Alb  3.1<L>  /  TBili  1.1  /  DBili  x   /  AST  64<H>  /  ALT  9<L>  /  AlkPhos  238<H>      LIVER FUNCTIONS - ( 07 Dec 2022 01:32 )  Alb: 3.1 g/dL / Pro: 6.3 g/dL / ALK PHOS: 238 U/L / ALT: 9 U/L / AST: 64 U/L / GGT: x           PT/INR - ( 07 Dec 2022 01:35 )   PT: 15.0 sec;   INR: 1.30 ratio       PTT - ( 07 Dec 2022 01:35 )  PTT:40.9 sec    ABG - ( 07 Dec 2022 07:00 )  pH, Arterial: 7.35  pH, Blood: x     /  pCO2: 44    /  pO2: 424   / HCO3: 24    / Base Excess: -1.3  /  SaO2: 99.7   CARDIOLOGY CONSULT PROGRESS NOTE  EDUARDO REAL  [id number]    INTERVAL EVENTS:  - tele: NSR 80-100s, PVCs  - intubated/sedated, able to follow commands    ROS:  Negative, except as above.    MEDICATIONS  (STANDING):  argatroban Infusion 0.075 MICROgram(s)/kG/Min (0.42 mL/Hr) IV Continuous <Continuous>  artificial  tears Solution 1 Drop(s) Both EYES four times a day  atorvastatin 40 milliGRAM(s) Oral at bedtime  chlorhexidine 0.12% Liquid 15 milliLiter(s) Oral Mucosa every 12 hours  chlorhexidine 2% Cloths 1 Application(s) Topical <User Schedule>  CRRT Treatment    <Continuous>  dexMEDEtomidine Infusion 1.5 MICROgram(s)/kG/Hr (34.8 mL/Hr) IV Continuous <Continuous>  dextrose 50% Injectable 50 milliLiter(s) IV Push every 15 minutes  HYDROmorphone  Injectable 0.5 milliGRAM(s) IV Push every 6 hours  insulin regular Infusion 6 Unit(s)/Hr (6 mL/Hr) IV Continuous <Continuous>  norepinephrine Infusion 0.05 MICROgram(s)/kG/Min (8.71 mL/Hr) IV Continuous <Continuous>  pantoprazole  Injectable 40 milliGRAM(s) IV Push two times a day  polyethylene glycol 3350 17 Gram(s) Oral two times a day  PrismaSATE Dialysate BK 0 / 3.5 5000 milliLiter(s) (2000 mL/Hr) CRRT <Continuous>  PrismaSOL Filtration BGK 4 / 2.5 5000 milliLiter(s) (800 mL/Hr) CRRT <Continuous>  PrismaSOL Filtration BGK 4 / 2.5 5000 milliLiter(s) (200 mL/Hr) CRRT <Continuous>  propofol Infusion 17.94 MICROgram(s)/kG/Min (10 mL/Hr) IV Continuous <Continuous>  senna 2 Tablet(s) Oral at bedtime  sodium chloride 0.9%. 1000 milliLiter(s) (5 mL/Hr) IV Continuous <Continuous>    MEDICATIONS  (PRN):  sodium chloride 0.9% lock flush 10 milliLiter(s) IV Push every 1 hour PRN Pre/post blood products, medications, blood draw, and to maintain line patency    Allergies  No Known Allergies    P/E:  Adult Advanced Hemodynamics Last 24 Hrs  CVP(mm Hg): 5 (07 Dec 2022 07:00) (2 - 24)    Vital Signs Last 24 Hrs  T(C): 36.4 (07 Dec 2022 04:00), Max: 36.9 (06 Dec 2022 16:00)  T(F): 97.5 (07 Dec 2022 04:00), Max: 98.4 (06 Dec 2022 16:00)  HR: 86 (07 Dec 2022 07:00) (81 - 184)  RR: 8 (07 Dec 2022 07:00) (8 - 25)  SpO2: 100% (07 Dec 2022 07:00) (100% - 100%)    Patient On (Oxygen Delivery Method): ventilator  O2 Concentration (%): 40    Daily Weight in k (07 Dec 2022 00:00)    I&O's Detail  06 Dec 2022 07:01  -  07 Dec 2022 07:00  IN:    Argatroban: 8 mL    Dexmedetomidine: 765.6 mL    Enteral Tube Flush: 150 mL    Insulin: 38 mL    IV PiggyBack: 350 mL    Norepinephrine: 92 mL    PRBCs (Packed Red Blood Cells): 300 mL    sodium chloride 0.9%: 110 mL    Vital1.5: 400 mL  Total IN: 2213.6 mL  OUT:    Indwelling Catheter - Urethral (mL): 221 mL    Other (mL): 2221 mL    Propofol: 0 mL  Total OUT: 2442 mL  Total NET: -228.4 mL    07 Dec 2022 07:01  -  07 Dec 2022 07:52  IN:    Argatroban: 0.4 mL    Dexmedetomidine: 34.8 mL    Insulin: 3 mL    sodium chloride 0.9%: 5 mL    Vital1.5: 40 mL  Total IN: 83.2 mL  OUT:    Indwelling Catheter - Urethral (mL): 5 mL    Norepinephrine: 0 mL    Propofol: 0 mL  Total OUT: 5 mL  Total NET: 78.2 mL    I&O's Summary  06 Dec 2022 07:01  -  07 Dec 2022 07:00  --------------------------------------------------------  IN: 2213.6 mL / OUT: 2442 mL / NET: -228.4 mL    07 Dec 2022 07:01  -  07 Dec 2022 07:52  --------------------------------------------------------  IN: 83.2 mL / OUT: 5 mL / NET: 78.2 mL    - gen: intubated/sedated, laying in hospital bed, rousable and follows commands  - HEENT: ETT in place, MMM  - heart: RRR, systolic murmur, S1/S2  - lungs: mechanical BS  - abd: distended, soft, NT, hypoactive BS  - ext: WWP, PPP, 2+ MESERET, ECMO cannulas in b/l groins w/ intact pulses    RELEVANT RECENT LABS/IMAGING/STUDIES:                        8.3     )-----------( 159      ( 07 Dec 2022 01:33 )             25.8         139  |  102  |  40<H>  ----------------------------<  131<H>  3.6   |  23  |  1.91<H>    Ca    8.8      07 Dec 2022 01:32  Phos  4.1       Mg     2.6         TPro  6.3  /  Alb  3.1<L>  /  TBili  1.1  /  DBili  x   /  AST  64<H>  /  ALT  9<L>  /  AlkPhos  238<H>      LIVER FUNCTIONS - ( 07 Dec 2022 01:32 )  Alb: 3.1 g/dL / Pro: 6.3 g/dL / ALK PHOS: 238 U/L / ALT: 9 U/L / AST: 64 U/L / GGT: x           PT/INR - ( 07 Dec 2022 01:35 )   PT: 15.0 sec;   INR: 1.30 ratio       PTT - ( 07 Dec 2022 01:35 )  PTT:40.9 sec    ABG - ( 07 Dec 2022 07:00 )  pH, Arterial: 7.35  pH, Blood: x     /  pCO2: 44    /  pO2: 424   / HCO3: 24    / Base Excess: -1.3  /  SaO2: 99.7

## 2022-12-07 NOTE — PROGRESS NOTE ADULT - PROBLEM SELECTOR PLAN 4
- continue VA ECMO (LFV/RFA, no anterograde perfusion). monitor distal pulses closely  - ECMO wean performed on 11/30 and favorable, now that IABP in place will re-attempt ECMO wean this evening  - remains pulsatile  - cont argatroban for AC while on ECMO, would titrate pressors down to target augmented MAPs 70s, will titrate GDMT as appropriate if above goal off pressors

## 2022-12-07 NOTE — PROGRESS NOTE ADULT - ATTENDING COMMENTS
Responsive to commands. Able to move all extremities. On exam, NAD, JVP approx 6 cm w/ HJR, RRR, CTA anterior lung fields, abdomen soft with bowel sounds, no edema, pulses intact. Labs reviewed - Na 135 (improved), BUN/Cr 61/2.51 (on HD), albumin 3.0, amylase/lipase stably elevated (improved from prior). LHC 12/6 revealed patent [id number] to LAD,  of RCA/LCx with L to L and L to R collaterals; aortogram revealed no filling of any other grafts. ECMO wean study done which he tolerated down to 1.5-2 LPM with unchanged EF at 35% and possibly moderate MR.   - c/w CVVH; goal net even to negative  - will plan for decannulation tomorrow  - c/w anticoagulation  - on statin  - IABP in place to allow weaning; once decannulated, will attempt to medically optimize   - prognosis guarded; family meeting held 12/6 and discussed at length current state and next steps with plan to f/u next week

## 2022-12-08 ENCOUNTER — APPOINTMENT (OUTPATIENT)
Dept: CARDIOTHORACIC SURGERY | Facility: HOSPITAL | Age: 62
End: 2022-12-08

## 2022-12-08 DIAGNOSIS — K13.79 OTHER LESIONS OF ORAL MUCOSA: ICD-10-CM

## 2022-12-08 PROBLEM — Z00.00 ENCOUNTER FOR PREVENTIVE HEALTH EXAMINATION: Status: ACTIVE | Noted: 2022-12-08

## 2022-12-08 LAB
ALBUMIN SERPL ELPH-MCNC: 2.8 G/DL — LOW (ref 3.3–5)
ALBUMIN SERPL ELPH-MCNC: 2.9 G/DL — LOW (ref 3.3–5)
ALP SERPL-CCNC: 237 U/L — HIGH (ref 40–120)
ALP SERPL-CCNC: 249 U/L — HIGH (ref 40–120)
ALT FLD-CCNC: 10 U/L — SIGNIFICANT CHANGE UP (ref 10–45)
ALT FLD-CCNC: 11 U/L — SIGNIFICANT CHANGE UP (ref 10–45)
AMYLASE P1 CFR SERPL: 458 U/L — HIGH (ref 25–125)
ANION GAP SERPL CALC-SCNC: 10 MMOL/L — SIGNIFICANT CHANGE UP (ref 5–17)
ANION GAP SERPL CALC-SCNC: 14 MMOL/L — SIGNIFICANT CHANGE UP (ref 5–17)
APTT BLD: 36.5 SEC — HIGH (ref 27.5–35.5)
APTT BLD: 36.9 SEC — HIGH (ref 27.5–35.5)
APTT BLD: 44.7 SEC — HIGH (ref 27.5–35.5)
APTT BLD: 46.3 SEC — HIGH (ref 27.5–35.5)
AST SERPL-CCNC: 60 U/L — HIGH (ref 10–40)
AST SERPL-CCNC: 63 U/L — HIGH (ref 10–40)
BASE EXCESS BLDV CALC-SCNC: -2.5 MMOL/L — LOW (ref -2–3)
BASE EXCESS BLDV CALC-SCNC: -4.9 MMOL/L — LOW (ref -2–3)
BASE EXCESS BLDV CALC-SCNC: -7.1 MMOL/L — LOW (ref -2–3)
BILIRUB SERPL-MCNC: 0.9 MG/DL — SIGNIFICANT CHANGE UP (ref 0.2–1.2)
BILIRUB SERPL-MCNC: 1.4 MG/DL — HIGH (ref 0.2–1.2)
BUN SERPL-MCNC: 32 MG/DL — HIGH (ref 7–23)
BUN SERPL-MCNC: 34 MG/DL — HIGH (ref 7–23)
CALCIUM SERPL-MCNC: 8 MG/DL — LOW (ref 8.4–10.5)
CALCIUM SERPL-MCNC: 8.8 MG/DL — SIGNIFICANT CHANGE UP (ref 8.4–10.5)
CHLORIDE SERPL-SCNC: 101 MMOL/L — SIGNIFICANT CHANGE UP (ref 96–108)
CHLORIDE SERPL-SCNC: 104 MMOL/L — SIGNIFICANT CHANGE UP (ref 96–108)
CO2 BLDV-SCNC: 21 MMOL/L — LOW (ref 22–26)
CO2 BLDV-SCNC: 22 MMOL/L — SIGNIFICANT CHANGE UP (ref 22–26)
CO2 BLDV-SCNC: 24 MMOL/L — SIGNIFICANT CHANGE UP (ref 22–26)
CO2 SERPL-SCNC: 21 MMOL/L — LOW (ref 22–31)
CO2 SERPL-SCNC: 22 MMOL/L — SIGNIFICANT CHANGE UP (ref 22–31)
CREAT SERPL-MCNC: 1.66 MG/DL — HIGH (ref 0.5–1.3)
CREAT SERPL-MCNC: 1.89 MG/DL — HIGH (ref 0.5–1.3)
CULTURE RESULTS: SIGNIFICANT CHANGE UP
EGFR: 40 ML/MIN/1.73M2 — LOW
EGFR: 46 ML/MIN/1.73M2 — LOW
FIBRINOGEN PPP-MCNC: 197 MG/DL — LOW (ref 200–445)
GAS PNL BLDA: SIGNIFICANT CHANGE UP
GAS PNL BLDV: SIGNIFICANT CHANGE UP
GLUCOSE BLDC GLUCOMTR-MCNC: 104 MG/DL — HIGH (ref 70–99)
GLUCOSE BLDC GLUCOMTR-MCNC: 106 MG/DL — HIGH (ref 70–99)
GLUCOSE BLDC GLUCOMTR-MCNC: 118 MG/DL — HIGH (ref 70–99)
GLUCOSE BLDC GLUCOMTR-MCNC: 129 MG/DL — HIGH (ref 70–99)
GLUCOSE BLDC GLUCOMTR-MCNC: 135 MG/DL — HIGH (ref 70–99)
GLUCOSE BLDC GLUCOMTR-MCNC: 142 MG/DL — HIGH (ref 70–99)
GLUCOSE BLDC GLUCOMTR-MCNC: 154 MG/DL — HIGH (ref 70–99)
GLUCOSE BLDC GLUCOMTR-MCNC: 175 MG/DL — HIGH (ref 70–99)
GLUCOSE BLDC GLUCOMTR-MCNC: 178 MG/DL — HIGH (ref 70–99)
GLUCOSE BLDC GLUCOMTR-MCNC: 186 MG/DL — HIGH (ref 70–99)
GLUCOSE BLDC GLUCOMTR-MCNC: 190 MG/DL — HIGH (ref 70–99)
GLUCOSE BLDC GLUCOMTR-MCNC: 198 MG/DL — HIGH (ref 70–99)
GLUCOSE BLDC GLUCOMTR-MCNC: 203 MG/DL — HIGH (ref 70–99)
GLUCOSE BLDC GLUCOMTR-MCNC: 207 MG/DL — HIGH (ref 70–99)
GLUCOSE BLDC GLUCOMTR-MCNC: 212 MG/DL — HIGH (ref 70–99)
GLUCOSE BLDC GLUCOMTR-MCNC: 243 MG/DL — HIGH (ref 70–99)
GLUCOSE BLDC GLUCOMTR-MCNC: 256 MG/DL — HIGH (ref 70–99)
GLUCOSE BLDC GLUCOMTR-MCNC: 292 MG/DL — HIGH (ref 70–99)
GLUCOSE SERPL-MCNC: 125 MG/DL — HIGH (ref 70–99)
GLUCOSE SERPL-MCNC: 184 MG/DL — HIGH (ref 70–99)
HAPTOGLOB SERPL-MCNC: 110 MG/DL — SIGNIFICANT CHANGE UP (ref 34–200)
HCO3 BLDV-SCNC: 19 MMOL/L — LOW (ref 22–29)
HCO3 BLDV-SCNC: 21 MMOL/L — LOW (ref 22–29)
HCO3 BLDV-SCNC: 22 MMOL/L — SIGNIFICANT CHANGE UP (ref 22–29)
HCT VFR BLD CALC: 25 % — LOW (ref 39–50)
HCT VFR BLD CALC: 31.1 % — LOW (ref 39–50)
HGB BLD-MCNC: 10 G/DL — LOW (ref 13–17)
HGB BLD-MCNC: 8.1 G/DL — LOW (ref 13–17)
HOROWITZ INDEX BLDV+IHG-RTO: 40 — SIGNIFICANT CHANGE UP
INR BLD: 1.16 RATIO — SIGNIFICANT CHANGE UP (ref 0.88–1.16)
LDH SERPL L TO P-CCNC: 456 U/L — HIGH (ref 50–242)
LIDOCAIN IGE QN: 1625 U/L — HIGH (ref 7–60)
MAGNESIUM SERPL-MCNC: 2.9 MG/DL — HIGH (ref 1.6–2.6)
MAGNESIUM SERPL-MCNC: 3 MG/DL — HIGH (ref 1.6–2.6)
MCHC RBC-ENTMCNC: 28.8 PG — SIGNIFICANT CHANGE UP (ref 27–34)
MCHC RBC-ENTMCNC: 29.2 PG — SIGNIFICANT CHANGE UP (ref 27–34)
MCHC RBC-ENTMCNC: 32.2 GM/DL — SIGNIFICANT CHANGE UP (ref 32–36)
MCHC RBC-ENTMCNC: 32.4 GM/DL — SIGNIFICANT CHANGE UP (ref 32–36)
MCV RBC AUTO: 89 FL — SIGNIFICANT CHANGE UP (ref 80–100)
MCV RBC AUTO: 90.7 FL — SIGNIFICANT CHANGE UP (ref 80–100)
NRBC # BLD: 0 /100 WBCS — SIGNIFICANT CHANGE UP (ref 0–0)
PCO2 BLDV: 39 MMHG — LOW (ref 42–55)
PCO2 BLDV: 41 MMHG — LOW (ref 42–55)
PH BLDV: 7.28 — LOW (ref 7.32–7.43)
PH BLDV: 7.33 — SIGNIFICANT CHANGE UP (ref 7.32–7.43)
PH BLDV: 7.37 — SIGNIFICANT CHANGE UP (ref 7.32–7.43)
PHOSPHATE SERPL-MCNC: 2.1 MG/DL — LOW (ref 2.5–4.5)
PHOSPHATE SERPL-MCNC: 4.2 MG/DL — SIGNIFICANT CHANGE UP (ref 2.5–4.5)
PLATELET # BLD AUTO: 157 K/UL — SIGNIFICANT CHANGE UP (ref 150–400)
PLATELET # BLD AUTO: 163 K/UL — SIGNIFICANT CHANGE UP (ref 150–400)
PO2 BLDV: 36 MMHG — SIGNIFICANT CHANGE UP (ref 25–45)
PO2 BLDV: 38 MMHG — SIGNIFICANT CHANGE UP (ref 25–45)
PO2 BLDV: 42 MMHG — SIGNIFICANT CHANGE UP (ref 25–45)
POTASSIUM SERPL-MCNC: 4.7 MMOL/L — SIGNIFICANT CHANGE UP (ref 3.5–5.3)
POTASSIUM SERPL-MCNC: 4.9 MMOL/L — SIGNIFICANT CHANGE UP (ref 3.5–5.3)
POTASSIUM SERPL-SCNC: 4.7 MMOL/L — SIGNIFICANT CHANGE UP (ref 3.5–5.3)
POTASSIUM SERPL-SCNC: 4.9 MMOL/L — SIGNIFICANT CHANGE UP (ref 3.5–5.3)
PROT SERPL-MCNC: 6.1 G/DL — SIGNIFICANT CHANGE UP (ref 6–8.3)
PROT SERPL-MCNC: 6.2 G/DL — SIGNIFICANT CHANGE UP (ref 6–8.3)
PROTHROM AB SERPL-ACNC: 13.5 SEC — HIGH (ref 10.5–13.4)
RBC # BLD: 2.81 M/UL — LOW (ref 4.2–5.8)
RBC # BLD: 3.43 M/UL — LOW (ref 4.2–5.8)
RBC # FLD: 14.7 % — HIGH (ref 10.3–14.5)
RBC # FLD: 15.1 % — HIGH (ref 10.3–14.5)
SAO2 % BLDV: 70.1 % — SIGNIFICANT CHANGE UP (ref 67–88)
SAO2 % BLDV: 72.8 % — SIGNIFICANT CHANGE UP (ref 67–88)
SAO2 % BLDV: 76.9 % — SIGNIFICANT CHANGE UP (ref 67–88)
SODIUM SERPL-SCNC: 136 MMOL/L — SIGNIFICANT CHANGE UP (ref 135–145)
SPECIMEN SOURCE: SIGNIFICANT CHANGE UP
WBC # BLD: 13.33 K/UL — HIGH (ref 3.8–10.5)
WBC # BLD: 17.82 K/UL — HIGH (ref 3.8–10.5)
WBC # FLD AUTO: 13.33 K/UL — HIGH (ref 3.8–10.5)
WBC # FLD AUTO: 17.82 K/UL — HIGH (ref 3.8–10.5)

## 2022-12-08 PROCEDURE — 33984 ECMO/ECLS RMVL PRPH CANNULA: CPT

## 2022-12-08 PROCEDURE — 71045 X-RAY EXAM CHEST 1 VIEW: CPT | Mod: 26,77,76

## 2022-12-08 PROCEDURE — 31575 DIAGNOSTIC LARYNGOSCOPY: CPT

## 2022-12-08 PROCEDURE — 36800 INSERTION OF CANNULA: CPT

## 2022-12-08 PROCEDURE — 71045 X-RAY EXAM CHEST 1 VIEW: CPT | Mod: 26

## 2022-12-08 PROCEDURE — 99292 CRITICAL CARE ADDL 30 MIN: CPT | Mod: 25

## 2022-12-08 PROCEDURE — 33984 ECMO/ECLS RMVL PRPH CANNULA: CPT | Mod: AS

## 2022-12-08 PROCEDURE — 99291 CRITICAL CARE FIRST HOUR: CPT

## 2022-12-08 PROCEDURE — 99291 CRITICAL CARE FIRST HOUR: CPT | Mod: GC

## 2022-12-08 PROCEDURE — 99233 SBSQ HOSP IP/OBS HIGH 50: CPT | Mod: GC

## 2022-12-08 PROCEDURE — 99292 CRITICAL CARE ADDL 30 MIN: CPT

## 2022-12-08 RX ORDER — CHLORHEXIDINE GLUCONATE 213 G/1000ML
1 SOLUTION TOPICAL
Refills: 0 | Status: DISCONTINUED | OUTPATIENT
Start: 2022-12-08 | End: 2022-12-16

## 2022-12-08 RX ORDER — SENNA PLUS 8.6 MG/1
2 TABLET ORAL AT BEDTIME
Refills: 0 | Status: DISCONTINUED | OUTPATIENT
Start: 2022-12-08 | End: 2022-12-16

## 2022-12-08 RX ORDER — ARGATROBAN 50 MG/50ML
0.14 INJECTION, SOLUTION INTRAVENOUS
Qty: 50 | Refills: 0 | Status: DISCONTINUED | OUTPATIENT
Start: 2022-12-08 | End: 2022-12-09

## 2022-12-08 RX ORDER — ATORVASTATIN CALCIUM 80 MG/1
40 TABLET, FILM COATED ORAL AT BEDTIME
Refills: 0 | Status: DISCONTINUED | OUTPATIENT
Start: 2022-12-08 | End: 2022-12-13

## 2022-12-08 RX ORDER — CEFUROXIME AXETIL 250 MG
1500 TABLET ORAL EVERY 24 HOURS
Refills: 0 | Status: COMPLETED | OUTPATIENT
Start: 2022-12-09 | End: 2022-12-10

## 2022-12-08 RX ORDER — HYDROMORPHONE HYDROCHLORIDE 2 MG/ML
0.5 INJECTION INTRAMUSCULAR; INTRAVENOUS; SUBCUTANEOUS ONCE
Refills: 0 | Status: DISCONTINUED | OUTPATIENT
Start: 2022-12-08 | End: 2022-12-08

## 2022-12-08 RX ORDER — AMIODARONE HYDROCHLORIDE 400 MG/1
150 TABLET ORAL ONCE
Refills: 0 | Status: COMPLETED | OUTPATIENT
Start: 2022-12-08 | End: 2022-12-08

## 2022-12-08 RX ORDER — DEXMEDETOMIDINE HYDROCHLORIDE IN 0.9% SODIUM CHLORIDE 4 UG/ML
1 INJECTION INTRAVENOUS
Qty: 200 | Refills: 0 | Status: DISCONTINUED | OUTPATIENT
Start: 2022-12-08 | End: 2022-12-12

## 2022-12-08 RX ORDER — DIGOXIN 250 MCG
250 TABLET ORAL ONCE
Refills: 0 | Status: COMPLETED | OUTPATIENT
Start: 2022-12-08 | End: 2022-12-08

## 2022-12-08 RX ORDER — VASOPRESSIN 20 [USP'U]/ML
0.05 INJECTION INTRAVENOUS
Qty: 40 | Refills: 0 | Status: DISCONTINUED | OUTPATIENT
Start: 2022-12-08 | End: 2022-12-09

## 2022-12-08 RX ORDER — DEXTROSE 50 % IN WATER 50 %
25 SYRINGE (ML) INTRAVENOUS
Refills: 0 | Status: DISCONTINUED | OUTPATIENT
Start: 2022-12-08 | End: 2022-12-09

## 2022-12-08 RX ORDER — DEXTROSE 50 % IN WATER 50 %
50 SYRINGE (ML) INTRAVENOUS
Refills: 0 | Status: DISCONTINUED | OUTPATIENT
Start: 2022-12-08 | End: 2022-12-09

## 2022-12-08 RX ORDER — AMIODARONE HYDROCHLORIDE 400 MG/1
0.5 TABLET ORAL
Qty: 900 | Refills: 0 | Status: DISCONTINUED | OUTPATIENT
Start: 2022-12-08 | End: 2022-12-11

## 2022-12-08 RX ORDER — DIGOXIN 250 MCG
250 TABLET ORAL ONCE
Refills: 0 | Status: DISCONTINUED | OUTPATIENT
Start: 2022-12-08 | End: 2022-12-08

## 2022-12-08 RX ORDER — DIGOXIN 250 MCG
500 TABLET ORAL ONCE
Refills: 0 | Status: COMPLETED | OUTPATIENT
Start: 2022-12-08 | End: 2022-12-08

## 2022-12-08 RX ORDER — POLYETHYLENE GLYCOL 3350 17 G/17G
17 POWDER, FOR SOLUTION ORAL
Refills: 0 | Status: DISCONTINUED | OUTPATIENT
Start: 2022-12-08 | End: 2022-12-16

## 2022-12-08 RX ORDER — ALBUMIN HUMAN 25 %
250 VIAL (ML) INTRAVENOUS ONCE
Refills: 0 | Status: DISCONTINUED | OUTPATIENT
Start: 2022-12-08 | End: 2022-12-08

## 2022-12-08 RX ORDER — PANTOPRAZOLE SODIUM 20 MG/1
40 TABLET, DELAYED RELEASE ORAL
Refills: 0 | Status: DISCONTINUED | OUTPATIENT
Start: 2022-12-08 | End: 2022-12-12

## 2022-12-08 RX ORDER — SODIUM CHLORIDE 9 MG/ML
1000 INJECTION INTRAMUSCULAR; INTRAVENOUS; SUBCUTANEOUS
Refills: 0 | Status: DISCONTINUED | OUTPATIENT
Start: 2022-12-08 | End: 2022-12-16

## 2022-12-08 RX ORDER — SODIUM BICARBONATE 1 MEQ/ML
50 SYRINGE (ML) INTRAVENOUS ONCE
Refills: 0 | Status: COMPLETED | OUTPATIENT
Start: 2022-12-08 | End: 2022-12-08

## 2022-12-08 RX ORDER — INSULIN HUMAN 100 [IU]/ML
3 INJECTION, SOLUTION SUBCUTANEOUS
Qty: 100 | Refills: 0 | Status: DISCONTINUED | OUTPATIENT
Start: 2022-12-08 | End: 2022-12-12

## 2022-12-08 RX ORDER — CHLORHEXIDINE GLUCONATE 213 G/1000ML
15 SOLUTION TOPICAL EVERY 12 HOURS
Refills: 0 | Status: DISCONTINUED | OUTPATIENT
Start: 2022-12-08 | End: 2022-12-16

## 2022-12-08 RX ORDER — ALBUMIN HUMAN 25 %
50 VIAL (ML) INTRAVENOUS
Refills: 0 | Status: COMPLETED | OUTPATIENT
Start: 2022-12-08 | End: 2022-12-08

## 2022-12-08 RX ORDER — CEFUROXIME AXETIL 250 MG
1500 TABLET ORAL EVERY 24 HOURS
Refills: 0 | Status: DISCONTINUED | OUTPATIENT
Start: 2022-12-08 | End: 2022-12-08

## 2022-12-08 RX ADMIN — PANTOPRAZOLE SODIUM 40 MILLIGRAM(S): 20 TABLET, DELAYED RELEASE ORAL at 06:23

## 2022-12-08 RX ADMIN — Medication 50 MILLILITER(S): at 14:26

## 2022-12-08 RX ADMIN — CHLORHEXIDINE GLUCONATE 15 MILLILITER(S): 213 SOLUTION TOPICAL at 18:19

## 2022-12-08 RX ADMIN — Medication 500 MICROGRAM(S): at 10:50

## 2022-12-08 RX ADMIN — Medication 1 DROP(S): at 01:26

## 2022-12-08 RX ADMIN — HYDROMORPHONE HYDROCHLORIDE 0.5 MILLIGRAM(S): 2 INJECTION INTRAMUSCULAR; INTRAVENOUS; SUBCUTANEOUS at 17:17

## 2022-12-08 RX ADMIN — HYDROMORPHONE HYDROCHLORIDE 0.5 MILLIGRAM(S): 2 INJECTION INTRAMUSCULAR; INTRAVENOUS; SUBCUTANEOUS at 17:31

## 2022-12-08 RX ADMIN — DEXMEDETOMIDINE HYDROCHLORIDE IN 0.9% SODIUM CHLORIDE 23.2 MICROGRAM(S)/KG/HR: 4 INJECTION INTRAVENOUS at 18:18

## 2022-12-08 RX ADMIN — AMIODARONE HYDROCHLORIDE 600 MILLIGRAM(S): 400 TABLET ORAL at 11:05

## 2022-12-08 RX ADMIN — Medication 63.75 MILLIMOLE(S): at 06:10

## 2022-12-08 RX ADMIN — CHLORHEXIDINE GLUCONATE 15 MILLILITER(S): 213 SOLUTION TOPICAL at 06:10

## 2022-12-08 RX ADMIN — INSULIN HUMAN 3 UNIT(S)/HR: 100 INJECTION, SOLUTION SUBCUTANEOUS at 14:04

## 2022-12-08 RX ADMIN — HYDROMORPHONE HYDROCHLORIDE 0.5 MILLIGRAM(S): 2 INJECTION INTRAMUSCULAR; INTRAVENOUS; SUBCUTANEOUS at 06:23

## 2022-12-08 RX ADMIN — AMIODARONE HYDROCHLORIDE 600 MILLIGRAM(S): 400 TABLET ORAL at 11:25

## 2022-12-08 RX ADMIN — Medication 250 MICROGRAM(S): at 15:03

## 2022-12-08 RX ADMIN — PANTOPRAZOLE SODIUM 40 MILLIGRAM(S): 20 TABLET, DELAYED RELEASE ORAL at 18:18

## 2022-12-08 RX ADMIN — HYDROMORPHONE HYDROCHLORIDE 0.5 MILLIGRAM(S): 2 INJECTION INTRAMUSCULAR; INTRAVENOUS; SUBCUTANEOUS at 00:56

## 2022-12-08 RX ADMIN — ARGATROBAN 0.56 MICROGRAM(S)/KG/MIN: 50 INJECTION, SOLUTION INTRAVENOUS at 18:19

## 2022-12-08 RX ADMIN — CHLORHEXIDINE GLUCONATE 1 APPLICATION(S): 213 SOLUTION TOPICAL at 06:10

## 2022-12-08 RX ADMIN — Medication 50 MILLIEQUIVALENT(S): at 12:30

## 2022-12-08 RX ADMIN — VASOPRESSIN 7.5 UNIT(S)/MIN: 20 INJECTION INTRAVENOUS at 14:04

## 2022-12-08 RX ADMIN — Medication 50 MILLILITER(S): at 14:06

## 2022-12-08 RX ADMIN — HYDROMORPHONE HYDROCHLORIDE 0.5 MILLIGRAM(S): 2 INJECTION INTRAMUSCULAR; INTRAVENOUS; SUBCUTANEOUS at 06:30

## 2022-12-08 RX ADMIN — Medication 1 DROP(S): at 06:30

## 2022-12-08 RX ADMIN — AMIODARONE HYDROCHLORIDE 33.3 MG/MIN: 400 TABLET ORAL at 14:04

## 2022-12-08 RX ADMIN — ATORVASTATIN CALCIUM 40 MILLIGRAM(S): 80 TABLET, FILM COATED ORAL at 21:35

## 2022-12-08 NOTE — PROGRESS NOTE ADULT - SUBJECTIVE AND OBJECTIVE BOX
CARDIOLOGY CONSULT PROGRESS NOTE  EDUARDO REAL  [id number]    INTERVAL EVENTS:  - tele:   -     ROS:  Negative, except as above.    MEDICATIONS  (STANDING):  argatroban Infusion 0.13 MICROgram(s)/kG/Min (0.73 mL/Hr) IV Continuous <Continuous>  artificial  tears Solution 1 Drop(s) Both EYES four times a day  atorvastatin 40 milliGRAM(s) Oral at bedtime  chlorhexidine 0.12% Liquid 15 milliLiter(s) Oral Mucosa every 12 hours  chlorhexidine 2% Cloths 1 Application(s) Topical <User Schedule>  CRRT Treatment    <Continuous>  dexMEDEtomidine Infusion 1.5 MICROgram(s)/kG/Hr (34.8 mL/Hr) IV Continuous <Continuous>  dextrose 50% Injectable 50 milliLiter(s) IV Push every 15 minutes  HYDROmorphone  Injectable 0.5 milliGRAM(s) IV Push every 6 hours  insulin regular Infusion 6 Unit(s)/Hr (6 mL/Hr) IV Continuous <Continuous>  norepinephrine Infusion 0.05 MICROgram(s)/kG/Min (8.71 mL/Hr) IV Continuous <Continuous>  pantoprazole  Injectable 40 milliGRAM(s) IV Push two times a day  polyethylene glycol 3350 17 Gram(s) Oral two times a day  PrismaSATE Dialysate BGK 4 / 2.5 5000 milliLiter(s) (1800 mL/Hr) CRRT <Continuous>  PrismaSOL Filtration BGK 4 / 2.5 5000 milliLiter(s) (800 mL/Hr) CRRT <Continuous>  PrismaSOL Filtration BGK 4 / 2.5 5000 milliLiter(s) (200 mL/Hr) CRRT <Continuous>  senna 2 Tablet(s) Oral at bedtime  sodium chloride 0.9%. 1000 milliLiter(s) (5 mL/Hr) IV Continuous <Continuous>    MEDICATIONS  (PRN):  sodium chloride 0.9% lock flush 10 milliLiter(s) IV Push every 1 hour PRN Pre/post blood products, medications, blood draw, and to maintain line patency    Allergies  No Known Allergies    P/E:  Adult Advanced Hemodynamics Last 24 Hrs  CVP(mm Hg): 6 (08 Dec 2022 07:00) (3 - 17)    Vital Signs Last 24 Hrs  T(C): 36.3 (08 Dec 2022 07:14), Max: 36.7 (08 Dec 2022 00:00)  T(F): 97.3 (08 Dec 2022 07:14), Max: 98.1 (08 Dec 2022 00:00)  HR: 80 (08 Dec 2022 07:14) (72 - 112)  BP: 108/54 (08 Dec 2022 07:14) (108/54 - 108/54)  RR: 18 (08 Dec 2022 07:14) (7 - 31)  SpO2: 100% (08 Dec 2022 07:14) (98% - 100%)    Patient On (Oxygen Delivery Method): ventilator  O2 Concentration (%): 40    Daily Height in cm: 172.7 (08 Dec 2022 07:14)    Daily Weight in k.6 (08 Dec 2022 00:00)    I&O's Detail  07 Dec 2022 07:01  -  08 Dec 2022 07:00  IN:    Argatroban: 0.8 mL    Argatroban: 2.5 mL    Argatroban: 11.9 mL    Dexmedetomidine: 828.3 mL    Insulin: 107 mL    IV PiggyBack: 50 mL    Norepinephrine: 41.7 mL    sodium chloride 0.9%: 120 mL    Vital1.5: 905 mL  Total IN: 2067.2 mL  OUT:    Indwelling Catheter - Urethral (mL): 105 mL    Other (mL): 2953 mL    Propofol: 0 mL  Total OUT: 3058 mL  Total NET: -990.8 mL    I&O's Summary  07 Dec 2022 07:01  -  08 Dec 2022 07:00  --------------------------------------------------------  IN: 2067.2 mL / OUT: 3058 mL / NET: -990.8 mL    - gen: intubated/sedated, laying in hospital bed, rousable and follows commands  - HEENT: ETT in place, MMM  - heart: RRR, systolic murmur, S1/S2  - lungs: mechanical BS  - abd: distended, soft, NT, hypoactive BS  - ext: WWP, PPP, 2+ MESERET, ECMO cannulas in b/l groins w/ intact pulses    RELEVANT RECENT LABS/IMAGING/STUDIES:                        8.1    13.33 )-----------( 163      ( 08 Dec 2022 00:36 )             25.0     136  |  104  |  32<H>  ----------------------------<  125<H>  4.7   |  22  |  1.66<H>    Ca    8.0<L>      08 Dec 2022 00:36  Phos  2.1     12-  Mg     3.0     12    TPro  6.1  /  Alb  2.8<L>  /  TBili  0.9  /  DBili  x   /  AST  63<H>  /  ALT  10  /  AlkPhos  249<H>  12-    LIVER FUNCTIONS - ( 08 Dec 2022 00:36 )  Alb: 2.8 g/dL / Pro: 6.1 g/dL / ALK PHOS: 249 U/L / ALT: 10 U/L / AST: 63 U/L / GGT: x           PT/INR - ( 07 Dec 2022 01:35 )   PT: 15.0 sec;   INR: 1.30 ratio       PTT - ( 08 Dec 2022 00:36 )  PTT:44.7 sec    ABG - ( 08 Dec 2022 05:10 )  pH, Arterial: 7.42  pH, Blood: x     /  pCO2: 38    /  pO2: 177   / HCO3: 25    / Base Excess: 0.2   /  SaO2: 98.4 CARDIOLOGY CONSULT PROGRESS NOTE  EDAURDO REAL  [id number]    INTERVAL EVENTS:  - tele:   -     ROS:  Negative, except as above.    MEDICATIONS  (STANDING):  argatroban Infusion 0.13 MICROgram(s)/kG/Min (0.73 mL/Hr) IV Continuous <Continuous>  artificial  tears Solution 1 Drop(s) Both EYES four times a day  atorvastatin 40 milliGRAM(s) Oral at bedtime  chlorhexidine 0.12% Liquid 15 milliLiter(s) Oral Mucosa every 12 hours  chlorhexidine 2% Cloths 1 Application(s) Topical <User Schedule>  CRRT Treatment    <Continuous>  dexMEDEtomidine Infusion 1.5 MICROgram(s)/kG/Hr (34.8 mL/Hr) IV Continuous <Continuous>  dextrose 50% Injectable 50 milliLiter(s) IV Push every 15 minutes  HYDROmorphone  Injectable 0.5 milliGRAM(s) IV Push every 6 hours  insulin regular Infusion 6 Unit(s)/Hr (6 mL/Hr) IV Continuous <Continuous>  norepinephrine Infusion 0.05 MICROgram(s)/kG/Min (8.71 mL/Hr) IV Continuous <Continuous>  pantoprazole  Injectable 40 milliGRAM(s) IV Push two times a day  polyethylene glycol 3350 17 Gram(s) Oral two times a day  PrismaSATE Dialysate BGK 4 / 2.5 5000 milliLiter(s) (1800 mL/Hr) CRRT <Continuous>  PrismaSOL Filtration BGK 4 / 2.5 5000 milliLiter(s) (800 mL/Hr) CRRT <Continuous>  PrismaSOL Filtration BGK 4 / 2.5 5000 milliLiter(s) (200 mL/Hr) CRRT <Continuous>  senna 2 Tablet(s) Oral at bedtime  sodium chloride 0.9%. 1000 milliLiter(s) (5 mL/Hr) IV Continuous <Continuous>    MEDICATIONS  (PRN):  sodium chloride 0.9% lock flush 10 milliLiter(s) IV Push every 1 hour PRN Pre/post blood products, medications, blood draw, and to maintain line patency    Allergies  No Known Allergies    P/E:  Adult Advanced Hemodynamics Last 24 Hrs  CVP(mm Hg): 6 (08 Dec 2022 07:00) (3 - 17)    Vital Signs Last 24 Hrs  T(C): 36.3 (08 Dec 2022 07:14), Max: 36.7 (08 Dec 2022 00:00)  T(F): 97.3 (08 Dec 2022 07:14), Max: 98.1 (08 Dec 2022 00:00)  HR: 80 (08 Dec 2022 07:14) (72 - 112)  BP: 108/54 (08 Dec 2022 07:14) (108/54 - 108/54)  RR: 18 (08 Dec 2022 07:14) (7 - 31)  SpO2: 100% (08 Dec 2022 07:14) (98% - 100%)    Patient On (Oxygen Delivery Method): ventilator  O2 Concentration (%): 40    Daily Height in cm: 172.7 (08 Dec 2022 07:14)    Daily Weight in k.6 (08 Dec 2022 00:00)    I&O's Detail  07 Dec 2022 07:01  -  08 Dec 2022 07:00  IN:    Argatroban: 0.8 mL    Argatroban: 2.5 mL    Argatroban: 11.9 mL    Dexmedetomidine: 828.3 mL    Insulin: 107 mL    IV PiggyBack: 50 mL    Norepinephrine: 41.7 mL    sodium chloride 0.9%: 120 mL    Vital1.5: 905 mL  Total IN: 2067.2 mL  OUT:    Indwelling Catheter - Urethral (mL): 105 mL    Other (mL): 2953 mL    Propofol: 0 mL  Total OUT: 3058 mL  Total NET: -990.8 mL    I&O's Summary  07 Dec 2022 07:01  -  08 Dec 2022 07:00  --------------------------------------------------------  IN: 2067.2 mL / OUT: 3058 mL / NET: -990.8 mL    - gen: intubated/sedated, laying in hospital bed, rousable and follows commands  - HEENT: ETT in place, MMM  - heart: RRR, systolic murmur, S1/S2  - lungs: mechanical BS  - abd: distended, soft, NT, hypoactive BS  - ext: WWP, PPP, 2+ MESERET, ECMO cannulas in b/l groins w/ intact pulses    RELEVANT RECENT LABS/IMAGING/STUDIES:                        8.1    13.33 )-----------( 163      ( 08 Dec 2022 00:36 )             25.0     136  |  104  |  32<H>  ----------------------------<  125<H>  4.7   |  22  |  1.66<H>    Ca    8.0<L>      08 Dec 2022 00:36  Phos  2.1     12-  Mg     3.0     12    TPro  6.1  /  Alb  2.8<L>  /  TBili  0.9  /  DBili  x   /  AST  63<H>  /  ALT  10  /  AlkPhos  249<H>  12-    LIVER FUNCTIONS - ( 08 Dec 2022 00:36 )  Alb: 2.8 g/dL / Pro: 6.1 g/dL / ALK PHOS: 249 U/L / ALT: 10 U/L / AST: 63 U/L / GGT: x           PT/INR - ( 07 Dec 2022 01:35 )   PT: 15.0 sec;   INR: 1.30 ratio       PTT - ( 08 Dec 2022 00:36 )  PTT:44.7 sec    ABG - ( 08 Dec 2022 05:10 )  pH, Arterial: 7.42  pH, Blood: x     /  pCO2: 38    /  pO2: 177   / HCO3: 25    / Base Excess: 0.2   /  SaO2: 98.4 CARDIOLOGY CONSULT PROGRESS NOTE  EDUARDO REAL  [id number]    INTERVAL EVENTS:  - tele:   -     ROS:  Negative, except as above.    MEDICATIONS  (STANDING):  argatroban Infusion 0.13 MICROgram(s)/kG/Min (0.73 mL/Hr) IV Continuous <Continuous>  artificial  tears Solution 1 Drop(s) Both EYES four times a day  atorvastatin 40 milliGRAM(s) Oral at bedtime  chlorhexidine 0.12% Liquid 15 milliLiter(s) Oral Mucosa every 12 hours  chlorhexidine 2% Cloths 1 Application(s) Topical <User Schedule>  CRRT Treatment    <Continuous>  dexMEDEtomidine Infusion 1.5 MICROgram(s)/kG/Hr (34.8 mL/Hr) IV Continuous <Continuous>  dextrose 50% Injectable 50 milliLiter(s) IV Push every 15 minutes  HYDROmorphone  Injectable 0.5 milliGRAM(s) IV Push every 6 hours  insulin regular Infusion 6 Unit(s)/Hr (6 mL/Hr) IV Continuous <Continuous>  norepinephrine Infusion 0.05 MICROgram(s)/kG/Min (8.71 mL/Hr) IV Continuous <Continuous>  pantoprazole  Injectable 40 milliGRAM(s) IV Push two times a day  polyethylene glycol 3350 17 Gram(s) Oral two times a day  PrismaSATE Dialysate BGK 4 / 2.5 5000 milliLiter(s) (1800 mL/Hr) CRRT <Continuous>  PrismaSOL Filtration BGK 4 / 2.5 5000 milliLiter(s) (800 mL/Hr) CRRT <Continuous>  PrismaSOL Filtration BGK 4 / 2.5 5000 milliLiter(s) (200 mL/Hr) CRRT <Continuous>  senna 2 Tablet(s) Oral at bedtime  sodium chloride 0.9%. 1000 milliLiter(s) (5 mL/Hr) IV Continuous <Continuous>    MEDICATIONS  (PRN):  sodium chloride 0.9% lock flush 10 milliLiter(s) IV Push every 1 hour PRN Pre/post blood products, medications, blood draw, and to maintain line patency    Allergies  No Known Allergies    P/E:  Adult Advanced Hemodynamics Last 24 Hrs  CVP(mm Hg): 6 (08 Dec 2022 07:00) (3 - 17)    Vital Signs Last 24 Hrs  T(C): 36.3 (08 Dec 2022 07:14), Max: 36.7 (08 Dec 2022 00:00)  T(F): 97.3 (08 Dec 2022 07:14), Max: 98.1 (08 Dec 2022 00:00)  HR: 80 (08 Dec 2022 07:14) (72 - 112)  BP: 108/54 (08 Dec 2022 07:14) (108/54 - 108/54)  RR: 18 (08 Dec 2022 07:14) (7 - 31)  SpO2: 100% (08 Dec 2022 07:14) (98% - 100%)    Patient On (Oxygen Delivery Method): ventilator  O2 Concentration (%): 40    Daily Height in cm: 172.7 (08 Dec 2022 07:14)    Daily Weight in k.6 (08 Dec 2022 00:00)    I&O's Detail  07 Dec 2022 07:01  -  08 Dec 2022 07:00  IN:    Argatroban: 0.8 mL    Argatroban: 2.5 mL    Argatroban: 11.9 mL    Dexmedetomidine: 828.3 mL    Insulin: 107 mL    IV PiggyBack: 50 mL    Norepinephrine: 41.7 mL    sodium chloride 0.9%: 120 mL    Vital1.5: 905 mL  Total IN: 2067.2 mL  OUT:    Indwelling Catheter - Urethral (mL): 105 mL    Other (mL): 2953 mL    Propofol: 0 mL  Total OUT: 3058 mL  Total NET: -990.8 mL    I&O's Summary  07 Dec 2022 07:01  -  08 Dec 2022 07:00  --------------------------------------------------------  IN: 2067.2 mL / OUT: 3058 mL / NET: -990.8 mL    - gen: intubated/sedated, laying in hospital bed, rousable and follows commands  - HEENT: ETT in place, MMM  - heart: RRR, systolic murmur, S1/S2  - lungs: mechanical BS  - abd: distended, soft, NT, hypoactive BS  - ext: WWP, PPP, 2+ MESERET, ECMO cannulas in b/l groins w/ intact pulses    RELEVANT RECENT LABS/IMAGING/STUDIES:                        8.1    13.33 )-----------( 163      ( 08 Dec 2022 00:36 )             25.0     136  |  104  |  32<H>  ----------------------------<  125<H>  4.7   |  22  |  1.66<H>    Ca    8.0<L>      08 Dec 2022 00:36  Phos  2.1     12-  Mg     3.0     12    TPro  6.1  /  Alb  2.8<L>  /  TBili  0.9  /  DBili  x   /  AST  63<H>  /  ALT  10  /  AlkPhos  249<H>  12-    LIVER FUNCTIONS - ( 08 Dec 2022 00:36 )  Alb: 2.8 g/dL / Pro: 6.1 g/dL / ALK PHOS: 249 U/L / ALT: 10 U/L / AST: 63 U/L / GGT: x           PT/INR - ( 07 Dec 2022 01:35 )   PT: 15.0 sec;   INR: 1.30 ratio       PTT - ( 08 Dec 2022 00:36 )  PTT:44.7 sec    ABG - ( 08 Dec 2022 05:10 )  pH, Arterial: 7.42  pH, Blood: x     /  pCO2: 38    /  pO2: 177   / HCO3: 25    / Base Excess: 0.2   /  SaO2: 98.4 CARDIOLOGY CONSULT PROGRESS NOTE  EDUARDO REAL  [id number]    INTERVAL EVENTS:  - tele: NSR 70-120s, no events  - intubated/sedated, ECMO decannulated this AM, alert, able to follow commands    ROS:  Negative, except as above.    MEDICATIONS  (STANDING):  argatroban Infusion 0.13 MICROgram(s)/kG/Min (0.73 mL/Hr) IV Continuous <Continuous>  artificial  tears Solution 1 Drop(s) Both EYES four times a day  atorvastatin 40 milliGRAM(s) Oral at bedtime  chlorhexidine 0.12% Liquid 15 milliLiter(s) Oral Mucosa every 12 hours  chlorhexidine 2% Cloths 1 Application(s) Topical <User Schedule>  CRRT Treatment    <Continuous>  dexMEDEtomidine Infusion 1.5 MICROgram(s)/kG/Hr (34.8 mL/Hr) IV Continuous <Continuous>  dextrose 50% Injectable 50 milliLiter(s) IV Push every 15 minutes  HYDROmorphone  Injectable 0.5 milliGRAM(s) IV Push every 6 hours  insulin regular Infusion 6 Unit(s)/Hr (6 mL/Hr) IV Continuous <Continuous>  norepinephrine Infusion 0.05 MICROgram(s)/kG/Min (8.71 mL/Hr) IV Continuous <Continuous>  pantoprazole  Injectable 40 milliGRAM(s) IV Push two times a day  polyethylene glycol 3350 17 Gram(s) Oral two times a day  PrismaSATE Dialysate BGK 4 / 2.5 5000 milliLiter(s) (1800 mL/Hr) CRRT <Continuous>  PrismaSOL Filtration BGK 4 / 2.5 5000 milliLiter(s) (800 mL/Hr) CRRT <Continuous>  PrismaSOL Filtration BGK 4 / 2.5 5000 milliLiter(s) (200 mL/Hr) CRRT <Continuous>  senna 2 Tablet(s) Oral at bedtime  sodium chloride 0.9%. 1000 milliLiter(s) (5 mL/Hr) IV Continuous <Continuous>    MEDICATIONS  (PRN):  sodium chloride 0.9% lock flush 10 milliLiter(s) IV Push every 1 hour PRN Pre/post blood products, medications, blood draw, and to maintain line patency    Allergies  No Known Allergies    P/E:  Adult Advanced Hemodynamics Last 24 Hrs  CVP(mm Hg): 6 (08 Dec 2022 07:00) (3 - 17)    Vital Signs Last 24 Hrs  T(C): 36.3 (08 Dec 2022 07:14), Max: 36.7 (08 Dec 2022 00:00)  T(F): 97.3 (08 Dec 2022 07:14), Max: 98.1 (08 Dec 2022 00:00)  HR: 80 (08 Dec 2022 07:14) (72 - 112)  BP: 108/54 (08 Dec 2022 07:14) (108/54 - 108/54)  RR: 18 (08 Dec 2022 07:14) (7 - 31)  SpO2: 100% (08 Dec 2022 07:14) (98% - 100%)    Patient On (Oxygen Delivery Method): ventilator  O2 Concentration (%): 40    Daily Height in cm: 172.7 (08 Dec 2022 07:14)    Daily Weight in k.6 (08 Dec 2022 00:00)    I&O's Detail  07 Dec 2022 07:01  -  08 Dec 2022 07:00  IN:    Argatroban: 0.8 mL    Argatroban: 2.5 mL    Argatroban: 11.9 mL    Dexmedetomidine: 828.3 mL    Insulin: 107 mL    IV PiggyBack: 50 mL    Norepinephrine: 41.7 mL    sodium chloride 0.9%: 120 mL    Vital1.5: 905 mL  Total IN: 2067.2 mL  OUT:    Indwelling Catheter - Urethral (mL): 105 mL    Other (mL): 2953 mL    Propofol: 0 mL  Total OUT: 3058 mL  Total NET: -990.8 mL    I&O's Summary  07 Dec 2022 07:01  -  08 Dec 2022 07:00  --------------------------------------------------------  IN: 2067.2 mL / OUT: 3058 mL / NET: -990.8 mL    - gen: intubated/sedated, laying in hospital bed, rousable and follows commands  - HEENT: NCAT, ETT in place, MMM  - heart: RRR, systolic murmur, S1/S2  - lungs: mechanical BS  - abd: distended, soft, NT, hypoactive BS  - ext: WWP, PPP, 2+ MESERET, IABP L groin, wound vac R groin    RELEVANT RECENT LABS/IMAGING/STUDIES:                        8.1    13.33 )-----------( 163      ( 08 Dec 2022 00:36 )             25.0     136  |  104  |  32<H>  ----------------------------<  125<H>  4.7   |  22  |  1.66<H>    Ca    8.0<L>      08 Dec 2022 00:36  Phos  2.1     12  Mg     3.0     12    TPro  6.1  /  Alb  2.8<L>  /  TBili  0.9  /  DBili  x   /  AST  63<H>  /  ALT  10  /  AlkPhos  249<H>  1208    LIVER FUNCTIONS - ( 08 Dec 2022 00:36 )  Alb: 2.8 g/dL / Pro: 6.1 g/dL / ALK PHOS: 249 U/L / ALT: 10 U/L / AST: 63 U/L / GGT: x           PT/INR - ( 07 Dec 2022 01:35 )   PT: 15.0 sec;   INR: 1.30 ratio       PTT - ( 08 Dec 2022 00:36 )  PTT:44.7 sec    ABG - ( 08 Dec 2022 05:10 )  pH, Arterial: 7.42  pH, Blood: x     /  pCO2: 38    /  pO2: 177   / HCO3: 25    / Base Excess: 0.2   /  SaO2: 98.4 CARDIOLOGY CONSULT PROGRESS NOTE  EDUARDO REAL  [id number]    INTERVAL EVENTS:  - tele: NSR 70-120s, no events  - intubated/sedated, ECMO decannulated this AM, alert, able to follow commands    ROS:  Negative, except as above.    MEDICATIONS  (STANDING):  argatroban Infusion 0.13 MICROgram(s)/kG/Min (0.73 mL/Hr) IV Continuous <Continuous>  artificial  tears Solution 1 Drop(s) Both EYES four times a day  atorvastatin 40 milliGRAM(s) Oral at bedtime  chlorhexidine 0.12% Liquid 15 milliLiter(s) Oral Mucosa every 12 hours  chlorhexidine 2% Cloths 1 Application(s) Topical <User Schedule>  CRRT Treatment    <Continuous>  dexMEDEtomidine Infusion 1.5 MICROgram(s)/kG/Hr (34.8 mL/Hr) IV Continuous <Continuous>  dextrose 50% Injectable 50 milliLiter(s) IV Push every 15 minutes  HYDROmorphone  Injectable 0.5 milliGRAM(s) IV Push every 6 hours  insulin regular Infusion 6 Unit(s)/Hr (6 mL/Hr) IV Continuous <Continuous>  norepinephrine Infusion 0.05 MICROgram(s)/kG/Min (8.71 mL/Hr) IV Continuous <Continuous>  pantoprazole  Injectable 40 milliGRAM(s) IV Push two times a day  polyethylene glycol 3350 17 Gram(s) Oral two times a day  PrismaSATE Dialysate BGK 4 / 2.5 5000 milliLiter(s) (1800 mL/Hr) CRRT <Continuous>  PrismaSOL Filtration BGK 4 / 2.5 5000 milliLiter(s) (800 mL/Hr) CRRT <Continuous>  PrismaSOL Filtration BGK 4 / 2.5 5000 milliLiter(s) (200 mL/Hr) CRRT <Continuous>  senna 2 Tablet(s) Oral at bedtime  sodium chloride 0.9%. 1000 milliLiter(s) (5 mL/Hr) IV Continuous <Continuous>    MEDICATIONS  (PRN):  sodium chloride 0.9% lock flush 10 milliLiter(s) IV Push every 1 hour PRN Pre/post blood products, medications, blood draw, and to maintain line patency    Allergies  No Known Allergies    P/E:  Adult Advanced Hemodynamics Last 24 Hrs  CVP(mm Hg): 6 (08 Dec 2022 07:00) (3 - 17)    Vital Signs Last 24 Hrs  T(C): 36.3 (08 Dec 2022 07:14), Max: 36.7 (08 Dec 2022 00:00)  T(F): 97.3 (08 Dec 2022 07:14), Max: 98.1 (08 Dec 2022 00:00)  HR: 80 (08 Dec 2022 07:14) (72 - 112)  BP: 108/54 (08 Dec 2022 07:14) (108/54 - 108/54)  RR: 18 (08 Dec 2022 07:14) (7 - 31)  SpO2: 100% (08 Dec 2022 07:14) (98% - 100%)    Patient On (Oxygen Delivery Method): ventilator  O2 Concentration (%): 40    Daily Height in cm: 172.7 (08 Dec 2022 07:14)    Daily Weight in k.6 (08 Dec 2022 00:00)    I&O's Detail  07 Dec 2022 07:01  -  08 Dec 2022 07:00  IN:    Argatroban: 0.8 mL    Argatroban: 2.5 mL    Argatroban: 11.9 mL    Dexmedetomidine: 828.3 mL    Insulin: 107 mL    IV PiggyBack: 50 mL    Norepinephrine: 41.7 mL    sodium chloride 0.9%: 120 mL    Vital1.5: 905 mL  Total IN: 2067.2 mL  OUT:    Indwelling Catheter - Urethral (mL): 105 mL    Other (mL): 2953 mL    Propofol: 0 mL  Total OUT: 3058 mL  Total NET: -990.8 mL    I&O's Summary  07 Dec 2022 07:01  -  08 Dec 2022 07:00  --------------------------------------------------------  IN: 2067.2 mL / OUT: 3058 mL / NET: -990.8 mL    - gen: intubated/sedated, laying in hospital bed, rousable and follows commands  - HEENT: NCAT, ETT in place, MMM  - heart: RRR, systolic murmur, S1/S2  - lungs: mechanical BS  - abd: distended, soft, NT, hypoactive BS  - ext: WWP, PPP, 2+ MESERET, IABP L groin, wound vac R groin    RELEVANT RECENT LABS/IMAGING/STUDIES:                        8.1    13.33 )-----------( 163      ( 08 Dec 2022 00:36 )             25.0     136  |  104  |  32<H>  ----------------------------<  125<H>  4.7   |  22  |  1.66<H>    Ca    8.0<L>      08 Dec 2022 00:36  Phos  2.1     12  Mg     3.0     12    TPro  6.1  /  Alb  2.8<L>  /  TBili  0.9  /  DBili  x   /  AST  63<H>  /  ALT  10  /  AlkPhos  249<H>  1208    LIVER FUNCTIONS - ( 08 Dec 2022 00:36 )  Alb: 2.8 g/dL / Pro: 6.1 g/dL / ALK PHOS: 249 U/L / ALT: 10 U/L / AST: 63 U/L / GGT: x           PT/INR - ( 07 Dec 2022 01:35 )   PT: 15.0 sec;   INR: 1.30 ratio       PTT - ( 08 Dec 2022 00:36 )  PTT:44.7 sec    ABG - ( 08 Dec 2022 05:10 )  pH, Arterial: 7.42  pH, Blood: x     /  pCO2: 38    /  pO2: 177   / HCO3: 25    / Base Excess: 0.2   /  SaO2: 98.4 CARDIOLOGY CONSULT PROGRESS NOTE  EDUARDO REAL  [id number]    INTERVAL EVENTS:  - tele: NSR 70-120s, no events  - intubated/sedated, ECMO decannulated this AM, alert, able to follow commands    ROS:  Negative, except as above.    MEDICATIONS  (STANDING):  argatroban Infusion 0.13 MICROgram(s)/kG/Min (0.73 mL/Hr) IV Continuous <Continuous>  artificial  tears Solution 1 Drop(s) Both EYES four times a day  atorvastatin 40 milliGRAM(s) Oral at bedtime  chlorhexidine 0.12% Liquid 15 milliLiter(s) Oral Mucosa every 12 hours  chlorhexidine 2% Cloths 1 Application(s) Topical <User Schedule>  CRRT Treatment    <Continuous>  dexMEDEtomidine Infusion 1.5 MICROgram(s)/kG/Hr (34.8 mL/Hr) IV Continuous <Continuous>  dextrose 50% Injectable 50 milliLiter(s) IV Push every 15 minutes  HYDROmorphone  Injectable 0.5 milliGRAM(s) IV Push every 6 hours  insulin regular Infusion 6 Unit(s)/Hr (6 mL/Hr) IV Continuous <Continuous>  norepinephrine Infusion 0.05 MICROgram(s)/kG/Min (8.71 mL/Hr) IV Continuous <Continuous>  pantoprazole  Injectable 40 milliGRAM(s) IV Push two times a day  polyethylene glycol 3350 17 Gram(s) Oral two times a day  PrismaSATE Dialysate BGK 4 / 2.5 5000 milliLiter(s) (1800 mL/Hr) CRRT <Continuous>  PrismaSOL Filtration BGK 4 / 2.5 5000 milliLiter(s) (800 mL/Hr) CRRT <Continuous>  PrismaSOL Filtration BGK 4 / 2.5 5000 milliLiter(s) (200 mL/Hr) CRRT <Continuous>  senna 2 Tablet(s) Oral at bedtime  sodium chloride 0.9%. 1000 milliLiter(s) (5 mL/Hr) IV Continuous <Continuous>    MEDICATIONS  (PRN):  sodium chloride 0.9% lock flush 10 milliLiter(s) IV Push every 1 hour PRN Pre/post blood products, medications, blood draw, and to maintain line patency    Allergies  No Known Allergies    P/E:  Adult Advanced Hemodynamics Last 24 Hrs  CVP(mm Hg): 6 (08 Dec 2022 07:00) (3 - 17)    Vital Signs Last 24 Hrs  T(C): 36.3 (08 Dec 2022 07:14), Max: 36.7 (08 Dec 2022 00:00)  T(F): 97.3 (08 Dec 2022 07:14), Max: 98.1 (08 Dec 2022 00:00)  HR: 80 (08 Dec 2022 07:14) (72 - 112)  BP: 108/54 (08 Dec 2022 07:14) (108/54 - 108/54)  RR: 18 (08 Dec 2022 07:14) (7 - 31)  SpO2: 100% (08 Dec 2022 07:14) (98% - 100%)    Patient On (Oxygen Delivery Method): ventilator  O2 Concentration (%): 40    Daily Height in cm: 172.7 (08 Dec 2022 07:14)    Daily Weight in k.6 (08 Dec 2022 00:00)    I&O's Detail  07 Dec 2022 07:01  -  08 Dec 2022 07:00  IN:    Argatroban: 0.8 mL    Argatroban: 2.5 mL    Argatroban: 11.9 mL    Dexmedetomidine: 828.3 mL    Insulin: 107 mL    IV PiggyBack: 50 mL    Norepinephrine: 41.7 mL    sodium chloride 0.9%: 120 mL    Vital1.5: 905 mL  Total IN: 2067.2 mL  OUT:    Indwelling Catheter - Urethral (mL): 105 mL    Other (mL): 2953 mL    Propofol: 0 mL  Total OUT: 3058 mL  Total NET: -990.8 mL    I&O's Summary  07 Dec 2022 07:01  -  08 Dec 2022 07:00  --------------------------------------------------------  IN: 2067.2 mL / OUT: 3058 mL / NET: -990.8 mL    - gen: intubated/sedated, laying in hospital bed, rousable and follows commands  - HEENT: NCAT, ETT in place, MMM  - heart: RRR, systolic murmur, S1/S2  - lungs: mechanical BS  - abd: distended, soft, NT, hypoactive BS  - ext: WWP, PPP, 2+ MESERET, IABP L groin, wound vac R groin    RELEVANT RECENT LABS/IMAGING/STUDIES:                        8.1    13.33 )-----------( 163      ( 08 Dec 2022 00:36 )             25.0     136  |  104  |  32<H>  ----------------------------<  125<H>  4.7   |  22  |  1.66<H>    Ca    8.0<L>      08 Dec 2022 00:36  Phos  2.1     12  Mg     3.0     12    TPro  6.1  /  Alb  2.8<L>  /  TBili  0.9  /  DBili  x   /  AST  63<H>  /  ALT  10  /  AlkPhos  249<H>  1208    LIVER FUNCTIONS - ( 08 Dec 2022 00:36 )  Alb: 2.8 g/dL / Pro: 6.1 g/dL / ALK PHOS: 249 U/L / ALT: 10 U/L / AST: 63 U/L / GGT: x           PT/INR - ( 07 Dec 2022 01:35 )   PT: 15.0 sec;   INR: 1.30 ratio       PTT - ( 08 Dec 2022 00:36 )  PTT:44.7 sec    ABG - ( 08 Dec 2022 05:10 )  pH, Arterial: 7.42  pH, Blood: x     /  pCO2: 38    /  pO2: 177   / HCO3: 25    / Base Excess: 0.2   /  SaO2: 98.4

## 2022-12-08 NOTE — PROGRESS NOTE ADULT - PROBLEM SELECTOR PLAN 4
- ECMO decannulated 12/08 after successful wean  - IABP in place, keep 1:1, target augmented MAP 70s  - remains pulsatile  - cont argatroban for AC while on IABP  - will titrate GDMT as appropriate if above goal off pressors.

## 2022-12-08 NOTE — PROGRESS NOTE ADULT - PROBLEM SELECTOR PLAN 1
Pt with non oliguric ERIC/ ATN in the setting of STEMI, cardiac arrest & cardiogenic shock  SCr on arrival was 2.15; trended down to hector 1.6 on 11/25;  slowly trended up & increased to 3.95 11/28.     Pt received IV diuretics. SCr increased to 4.19 with BUN of 101 on 12/5/22. Pt with significant volume overload. Pt initated on CRRT/CVVHDF to optimize volume and electrolytes on 12/5/22. BP being maintained on vasopressors. Pt likely with ATN. Pt underwent decannulation of ECMO today and was taken off CRRT. Monitor off CRRT today. Will evaluate need for HD daily. Monitor labs and urine output. Avoid any potential nephrotoxins. Dose medications as per eGFR.

## 2022-12-08 NOTE — PROGRESS NOTE ADULT - SUBJECTIVE AND OBJECTIVE BOX
Patient seen and examined at the bedside.    Remained critically ill on continuous ICU monitoring.    OBJECTIVE:  Vital Signs Last 24 Hrs  T(C): 37.9 (08 Dec 2022 20:00), Max: 38.1 (08 Dec 2022 18:00)  T(F): 100.2 (08 Dec 2022 20:00), Max: 100.6 (08 Dec 2022 18:00)  HR: 81 (08 Dec 2022 21:30) (74 - 134)  BP: 108/54 (08 Dec 2022 07:40) (108/54 - 108/54)  BP(mean): 76 (08 Dec 2022 07:40) (76 - 76)  RR: 3 (08 Dec 2022 19:45) (3 - 29)  SpO2: 100% (08 Dec 2022 21:30) (95% - 100%)    Parameters below as of 08 Dec 2022 20:00  Patient On (Oxygen Delivery Method): ventilator    O2 Concentration (%): 40        Physical Exam:   General: Intubated, multiple lines gtt  Neurology: Sedated  Eyes: bilateral pupils equal and reactive   ENT/Neck: +ETT midline, Neck supple, trachea midline, No JVD   Respiratory: rhonchi bilaterally   CV: S1S2, no murmurs        [x] Sinus rhythm  Abdominal: Softly distended,+BS   Extremities: 1+ pedal edema noted, + peripheral pulses palpable, warm  Skin: No Rashes, Hematoma, Ecchymosis                    Assessment:  61 y/o male with PMH of CABG, DM, HTN, HLD presenting as transfer from [place] for c/f STEMI. PTA arrival received 325 aspirin, 600 plavix, and no heparin drip started. Around 1:30 am patient had multiple episodes of non-bloody diarrhea and emesis with associated abdominal pain and chest pain, unable to localize, non-radiating, with associated SOB and no associated palpitations or diaphoresis. No recent fevers/chills, cough, rashes, or changes in urination. Does report mild diffuse back pain; started 5 days ago in setting on injury while walking and lifting objects. Denies focal weakness, numbness/tingling, or LOC due does report mild dizziness.    acute rspiratory distress , cardiogenic shock s/p [facility] ECMO 11/25  Hemodynamic instability  Hypovolemia  Acute blood loss anemia  acute pancreatitis      Plan:   ***Neuro***  CT head showed 4mm subdural hematoma 11/26: repeat CT head unchanged 12/01  Plan for additional Repeat CT for Pancreatitis assessment   [x] Sedated with [x] Precedex   Post operative neuro assessment     ***Cardiovascular***  12/3 TTE: EF 30-35%, moderate-severe LV dysfunction  Invasive hemodynamic monitoring, assess perfusion indices   12/7 TTE turndown EF 30-40%, normal RV, MR mod   SR / CVP 10/ MAP 62/ Hct 31.1/ Lactate 1.7  [x] Vasopressin 0.05 Units  [x] L Femoral IABP put in 12/7/22 with good augmentation @ 1:1 [x] ECMO d/c'ed 12/8/22   Continuos reassessment of hemodynamics   [x] Statin  [x] AC Therapy with Argatroban gtt for ECMO circuit (PTT 40-45)  [x] Amiodarone gtt for afib prophylaxis   [x] IV Digoxin for rate control   cardiac cath today showed patent LIMA graft and other grafts occluded      ***Pulmonary***  Post op vent management   Titration of FiO2 and PEEP, follow SpO2, CXR, blood gasses   100% FiO2 on ECMO    Mode: AC/ CMV (Assist Control/ Continuous Mandatory Ventilation)  RR (machine): 14  FiO2: 40  PEEP: 10  ITime: 1  MAP: 15  PC: 18  PIP: 28              ***GI***  [x] NPO with TF's @goal of 65cc/hr   [x] Protonix    Bowel regimen with Miralax and senna       ***Renal***  ERIC, renal following  Even fluid goal  Continue to monitor I/Os, BUN/Creatinine.   Replete lytes PRN  De Dios present      ***ID***  No active antibiotic coverage      ***Endocrine***  [x]  DM2: HbA1c 7.3%                - [x] Insulin gtt              - Need tight glycemic control to prevent wound infection.      Patient requires continuous monitoring with bedside rhythm monitoring, pulse oximetry monitoring, and continuous central venous and arterial pressure monitoring; and intermittent blood gas analysis. Care plan discussed with the ICU care team.   Patient remained critical, at risk for life threatening decompensation.    I have spent 30 minutes providing critical care management to this patient.    By signing my name below, I, Erin Palafox, attest that this documentation has been prepared under the direction and in the presence of Bela Sanchez NP.  Electronically signed: Jolly Hunt, 12-08-22 @ 21:46    I, Bela Sanchez , personally performed the services described in this documentation. all medical record entries made by the jolly were at my direction and in my presence. I have reviewed the chart and agree that the record reflects my personal performance and is accurate and complete  Electronically signed: Bela Sanchez NP. Patient seen and examined at the bedside.    Remained critically ill on continuous ICU monitoring.    OBJECTIVE:  Vital Signs Last 24 Hrs  T(C): 37.9 (08 Dec 2022 20:00), Max: 38.1 (08 Dec 2022 18:00)  T(F): 100.2 (08 Dec 2022 20:00), Max: 100.6 (08 Dec 2022 18:00)  HR: 81 (08 Dec 2022 21:30) (74 - 134)  BP: 108/54 (08 Dec 2022 07:40) (108/54 - 108/54)  BP(mean): 76 (08 Dec 2022 07:40) (76 - 76)  RR: 3 (08 Dec 2022 19:45) (3 - 29)  SpO2: 100% (08 Dec 2022 21:30) (95% - 100%)    Parameters below as of 08 Dec 2022 20:00  Patient On (Oxygen Delivery Method): ventilator    O2 Concentration (%): 40        Physical Exam:   General: Intubated, multiple lines gtt  Neurology: Sedated  Eyes: bilateral pupils equal and reactive   ENT/Neck: +ETT midline, Neck supple, trachea midline, No JVD   Respiratory: rhonchi bilaterally   CV: S1S2, no murmurs        [x] Sinus rhythm  Abdominal: Softly distended,+BS   Extremities: 1+ pedal edema noted, + peripheral pulses palpable, warm  Skin: No Rashes, Hematoma, Ecchymosis                    Assessment:  63 y/o male with PMH of CABG, DM, HTN, HLD presenting as transfer from [place] for c/f STEMI. PTA arrival received 325 aspirin, 600 plavix, and no heparin drip started. Around 1:30 am patient had multiple episodes of non-bloody diarrhea and emesis with associated abdominal pain and chest pain, unable to localize, non-radiating, with associated SOB and no associated palpitations or diaphoresis. No recent fevers/chills, cough, rashes, or changes in urination. Does report mild diffuse back pain; started 5 days ago in setting on injury while walking and lifting objects. Denies focal weakness, numbness/tingling, or LOC due does report mild dizziness.    acute rspiratory distress , cardiogenic shock s/p [facility] ECMO 11/25  Hemodynamic instability  Hypovolemia  Acute blood loss anemia  acute pancreatitis      Plan:   ***Neuro***  CT head showed 4mm subdural hematoma 11/26: repeat CT head unchanged 12/01  Plan for additional Repeat CT for Pancreatitis assessment   [x] Sedated with [x] Precedex   Post operative neuro assessment     ***Cardiovascular***  12/3 TTE: EF 30-35%, moderate-severe LV dysfunction  Invasive hemodynamic monitoring, assess perfusion indices   12/7 TTE turndown EF 30-40%, normal RV, MR mod   SR / CVP 10/ MAP 62/ Hct 31.1/ Lactate 1.7  [x] Vasopressin 0.05 Units  [x] L Femoral IABP put in 12/7/22 with good augmentation @ 1:1 [x] ECMO d/c'ed 12/8/22   Continuos reassessment of hemodynamics   [x] Statin  [x] AC Therapy with Argatroban gtt for ECMO circuit (PTT 40-45)  [x] Amiodarone gtt for afib prophylaxis   [x] IV Digoxin for rate control   cardiac cath today showed patent LIMA graft and other grafts occluded      ***Pulmonary***  Post op vent management   Titration of FiO2 and PEEP, follow SpO2, CXR, blood gasses   100% FiO2 on ECMO    Mode: AC/ CMV (Assist Control/ Continuous Mandatory Ventilation)  RR (machine): 14  FiO2: 40  PEEP: 10  ITime: 1  MAP: 15  PC: 18  PIP: 28              ***GI***  [x] NPO with TF's @goal of 65cc/hr   [x] Protonix    Bowel regimen with Miralax and senna       ***Renal***  ERIC, renal following  Even fluid goal  Continue to monitor I/Os, BUN/Creatinine.   Replete lytes PRN  De Dios present      ***ID***  No active antibiotic coverage      ***Endocrine***  [x]  DM2: HbA1c 7.3%                - [x] Insulin gtt              - Need tight glycemic control to prevent wound infection.      Patient requires continuous monitoring with bedside rhythm monitoring, pulse oximetry monitoring, and continuous central venous and arterial pressure monitoring; and intermittent blood gas analysis. Care plan discussed with the ICU care team.   Patient remained critical, at risk for life threatening decompensation.    I have spent 30 minutes providing critical care management to this patient.    By signing my name below, I, Erin Palafox, attest that this documentation has been prepared under the direction and in the presence of Bela Sanchez NP.  Electronically signed: Jolly Hunt, 12-08-22 @ 21:46    I, Bela Sanchez , personally performed the services described in this documentation. all medical record entries made by the jolly were at my direction and in my presence. I have reviewed the chart and agree that the record reflects my personal performance and is accurate and complete  Electronically signed: Bela Sanchez NP. Patient seen and examined at the bedside.    Remained critically ill on continuous ICU monitoring.    OBJECTIVE:  Vital Signs Last 24 Hrs  T(C): 37.9 (08 Dec 2022 20:00), Max: 38.1 (08 Dec 2022 18:00)  T(F): 100.2 (08 Dec 2022 20:00), Max: 100.6 (08 Dec 2022 18:00)  HR: 81 (08 Dec 2022 21:30) (74 - 134)  BP: 108/54 (08 Dec 2022 07:40) (108/54 - 108/54)  BP(mean): 76 (08 Dec 2022 07:40) (76 - 76)  RR: 3 (08 Dec 2022 19:45) (3 - 29)  SpO2: 100% (08 Dec 2022 21:30) (95% - 100%)    Parameters below as of 08 Dec 2022 20:00  Patient On (Oxygen Delivery Method): ventilator    O2 Concentration (%): 40        Physical Exam:   General: Intubated, multiple lines gtt  Neurology: Sedated  Eyes: bilateral pupils equal and reactive   ENT/Neck: +ETT midline, Neck supple, trachea midline, No JVD   Respiratory: rhonchi bilaterally   CV: S1S2, no murmurs        [x] Sinus rhythm  Abdominal: Softly distended,+BS   Extremities: 1+ pedal edema noted, + peripheral pulses palpable, warm  Skin: No Rashes, Hematoma, Ecchymosis                    Assessment:  63 y/o male with PMH of CABG, DM, HTN, HLD presenting as transfer from [place] for c/f STEMI. PTA arrival received 325 aspirin, 600 plavix, and no heparin drip started. Around 1:30 am patient had multiple episodes of non-bloody diarrhea and emesis with associated abdominal pain and chest pain, unable to localize, non-radiating, with associated SOB and no associated palpitations or diaphoresis. No recent fevers/chills, cough, rashes, or changes in urination. Does report mild diffuse back pain; started 5 days ago in setting on injury while walking and lifting objects. Denies focal weakness, numbness/tingling, or LOC due does report mild dizziness.    acute rspiratory distress , cardiogenic shock s/p [facility] ECMO 11/25  Hemodynamic instability  Hypovolemia  Acute blood loss anemia  acute pancreatitis      Plan:   ***Neuro***  CT head showed 4mm subdural hematoma 11/26: repeat CT head unchanged 12/01  Plan for additional Repeat CT for Pancreatitis assessment   [x] Sedated with [x] Precedex   Post operative neuro assessment     ***Cardiovascular***  12/3 TTE: EF 30-35%, moderate-severe LV dysfunction  Invasive hemodynamic monitoring, assess perfusion indices   12/7 TTE turndown EF 30-40%, normal RV, MR mod   SR / CVP 10/ MAP 62/ Hct 31.1/ Lactate 1.7  [x] Vasopressin 0.05 Units  [x] L Femoral IABP put in 12/7/22 with good augmentation @ 1:1 [x] ECMO d/c'ed 12/8/22   Continuos reassessment of hemodynamics   [x] Statin  [x] AC Therapy with Argatroban gtt for ECMO circuit (PTT 40-45)  [x] Amiodarone gtt for afib prophylaxis   [x] IV Digoxin for rate control   cardiac cath today showed patent LIMA graft and other grafts occluded      ***Pulmonary***  Post op vent management   Titration of FiO2 and PEEP, follow SpO2, CXR, blood gasses   100% FiO2 on ECMO    Mode: AC/ CMV (Assist Control/ Continuous Mandatory Ventilation)  RR (machine): 14  FiO2: 40  PEEP: 10  ITime: 1  MAP: 15  PC: 18  PIP: 28              ***GI***  [x] NPO with TF's @goal of 65cc/hr   [x] Protonix    Bowel regimen with Miralax and senna       ***Renal***  ERIC, renal following  Even fluid goal  Continue to monitor I/Os, BUN/Creatinine.   Replete lytes PRN  De Dios present      ***ID***  No active antibiotic coverage      ***Endocrine***  [x]  DM2: HbA1c 7.3%                - [x] Insulin gtt              - Need tight glycemic control to prevent wound infection.      Patient requires continuous monitoring with bedside rhythm monitoring, pulse oximetry monitoring, and continuous central venous and arterial pressure monitoring; and intermittent blood gas analysis. Care plan discussed with the ICU care team.   Patient remained critical, at risk for life threatening decompensation.    I have spent 30 minutes providing critical care management to this patient.    By signing my name below, I, Erin Palafox, attest that this documentation has been prepared under the direction and in the presence of Bela Sanchez NP.  Electronically signed: Jolly Hunt, 12-08-22 @ 21:46    I, Bela Sanchez , personally performed the services described in this documentation. all medical record entries made by the jolly were at my direction and in my presence. I have reviewed the chart and agree that the record reflects my personal performance and is accurate and complete  Electronically signed: Bela Sanchez NP. Patient seen and examined at the bedside.    Remained critically ill on continuous ICU monitoring.    24 hour events:   ECMO decannulation today, received 2 PRBC  Aflutter, DCCV x2-->SR,  DC, Amio bolus + gtt and dig loaded  noted to have soft palate bleed, ENT scope- stable   CVVHD not resumed post-op, electrolytes stable    OBJECTIVE:  Vital Signs Last 24 Hrs  T(C): 37.9 (08 Dec 2022 20:00), Max: 38.1 (08 Dec 2022 18:00)  T(F): 100.2 (08 Dec 2022 20:00), Max: 100.6 (08 Dec 2022 18:00)  HR: 81 (08 Dec 2022 21:30) (74 - 134)  BP: 108/54 (08 Dec 2022 07:40) (108/54 - 108/54)  BP(mean): 76 (08 Dec 2022 07:40) (76 - 76)  RR: 3 (08 Dec 2022 19:45) (3 - 29)  SpO2: 100% (08 Dec 2022 21:30) (95% - 100%)    Parameters below as of 08 Dec 2022 20:00  Patient On (Oxygen Delivery Method): ventilator    O2 Concentration (%): 40        Physical Exam:   General: Intubated, multiple lines gtt  Neurology: Sedated  Eyes: bilateral pupils equal and reactive   ENT/Neck: +ETT midline, Neck supple, trachea midline, No JVD   Respiratory: rhonchi bilaterally   CV: S1S2, no murmurs        [x] Sinus rhythm  Abdominal: Softly distended,+BS   Extremities: 1+ pedal edema noted, + peripheral pulses palpable, warm  Skin: No Rashes, Hematoma, Ecchymosis                    Assessment:  61 y/o male with PMH of CABG, DM, HTN, HLD presenting as transfer from [place] for c/f STEMI. PTA arrival received 325 aspirin, 600 plavix, and no heparin drip started. Around 1:30 am patient had multiple episodes of non-bloody diarrhea and emesis with associated abdominal pain and chest pain, unable to localize, non-radiating, with associated SOB and no associated palpitations or diaphoresis. No recent fevers/chills, cough, rashes, or changes in urination. Does report mild diffuse back pain; started 5 days ago in setting on injury while walking and lifting objects. Denies focal weakness, numbness/tingling, or LOC due does report mild dizziness.    acute rspiratory distress , cardiogenic shock s/p [facility] ECMO   Hemodynamic instability  Hypovolemia  Acute blood loss anemia  acute pancreatitis      Plan:   ***Neuro***  CT head showed 4mm subdural hematoma : repeat CT head unchanged   [x] Sedated with [x] Precedex for vent synchrony  Post operative neuro assessment   Soft palate laceration, ENT scoped , stable, no acute intervention at this time    ***Cardiovascular***  12/3 TTE: EF 30-35%, moderate-severe LV dysfunction  Invasive hemodynamic monitoring, assess perfusion indices    TTE turndown EF 30-40%, normal RV, MR mod   [x]  cath patent LIMA graft and other grafts occluded, femoral IABP placed, with good augmentation @ 1:1   [x]  ECMO d/c'd  SR / CVP 10/ MAP 62/ Hct 31.1/ Lactate 1.7  [x] Vasopressin 0.051 Units  Continuos reassessment of hemodynamics   [x] Statin   Aflutter s/p DCCV x2  [x] AC Therapy with Argatroban gtt pAF/flutter   HIT NG, MARY pending, f/u result  [x] Amiodarone gtt and digoxin for afib prophylaxis /rate control      ***Pulmonary***  Post op vent management   Titration of FiO2 and PEEP, follow SpO2, CXR, blood gasses   Plan to CPAP in AM to assess     Mode: AC/ CMV (Assist Control/ Continuous Mandatory Ventilation)  RR (machine): 14  FiO2: 40  PEEP: 10  ITime: 1  MAP: 15  PC: 18  PIP: 28              ***GI***  [x] NPO with TF's @goal of 65cc/hr   [x] Protonix    Bowel regimen with Miralax and senna       ***Renal***  ERIC, renal following CVVHD started , held post procedure today, will re-evaluate resuming, currently no acute indication tonight  CVP goal 10-12  Continue to monitor I/Os, BUN/Creatinine.   Replete lytes PRN  De Dios present      ***ID***  Zinacef for periop coverage  febrile this evening, BCx and procalcitonin ordered, f/u results    ***Endocrine***  [x]  DM2: HbA1c 7.3%                - [x] Insulin gtt              - Need tight glycemic control to prevent wound infection.      Patient requires continuous monitoring with bedside rhythm monitoring, pulse oximetry monitoring, and continuous central venous and arterial pressure monitoring; and intermittent blood gas analysis. Care plan discussed with the ICU care team.   Patient remained critical, at risk for life threatening decompensation.    I have spent 30 minutes providing critical care management to this patient.    By signing my name below, I, Erin Palafox, attest that this documentation has been prepared under the direction and in the presence of Bela Sanchez NP.  Electronically signed: Brian Hunt, 22 @ 21:46    I, Bela Sanchez , personally performed the services described in this documentation. all medical record entries made by the karlaibkyle were at my direction and in my presence. I have reviewed the chart and agree that the record reflects my personal performance and is accurate and complete  Electronically signed: Bela Sanchez NP. Patient seen and examined at the bedside.    Remained critically ill on continuous ICU monitoring.    24 hour events:   ECMO decannulation today, received 2 PRBC  Aflutter, DCCV x2-->SR,  DC, Amio bolus + gtt and dig loaded  noted to have soft palate bleed, ENT scope- stable   CVVHD not resumed post-op, electrolytes stable    OBJECTIVE:  Vital Signs Last 24 Hrs  T(C): 37.9 (08 Dec 2022 20:00), Max: 38.1 (08 Dec 2022 18:00)  T(F): 100.2 (08 Dec 2022 20:00), Max: 100.6 (08 Dec 2022 18:00)  HR: 81 (08 Dec 2022 21:30) (74 - 134)  BP: 108/54 (08 Dec 2022 07:40) (108/54 - 108/54)  BP(mean): 76 (08 Dec 2022 07:40) (76 - 76)  RR: 3 (08 Dec 2022 19:45) (3 - 29)  SpO2: 100% (08 Dec 2022 21:30) (95% - 100%)    Parameters below as of 08 Dec 2022 20:00  Patient On (Oxygen Delivery Method): ventilator    O2 Concentration (%): 40        Physical Exam:   General: Intubated, multiple lines gtt  Neurology: Sedated  Eyes: bilateral pupils equal and reactive   ENT/Neck: +ETT midline, Neck supple, trachea midline, No JVD   Respiratory: rhonchi bilaterally   CV: S1S2, no murmurs        [x] Sinus rhythm  Abdominal: Softly distended,+BS   Extremities: 1+ pedal edema noted, + peripheral pulses palpable, warm  Skin: No Rashes, Hematoma, Ecchymosis                    Assessment:  61 y/o male with PMH of CABG, DM, HTN, HLD presenting as transfer from [place] for c/f STEMI. PTA arrival received 325 aspirin, 600 plavix, and no heparin drip started. Around 1:30 am patient had multiple episodes of non-bloody diarrhea and emesis with associated abdominal pain and chest pain, unable to localize, non-radiating, with associated SOB and no associated palpitations or diaphoresis. No recent fevers/chills, cough, rashes, or changes in urination. Does report mild diffuse back pain; started 5 days ago in setting on injury while walking and lifting objects. Denies focal weakness, numbness/tingling, or LOC due does report mild dizziness.    acute rspiratory distress , cardiogenic shock s/p VA ECMO   Hemodynamic instability  Hypovolemia  Acute blood loss anemia  acute pancreatitis      Plan:   ***Neuro***  CT head showed 4mm subdural hematoma : repeat CT head unchanged   [x] Sedated with [x] Precedex for vent synchrony  Post operative neuro assessment   Soft palate laceration, ENT scoped , stable, no acute intervention at this time    ***Cardiovascular***  12/3 TTE: EF 30-35%, moderate-severe LV dysfunction  Invasive hemodynamic monitoring, assess perfusion indices    TTE turndown EF 30-40%, normal RV, MR mod   [x]  cath patent LIMA graft and other grafts occluded, femoral IABP placed, with good augmentation @ 1:1   [x]  ECMO d/c'd  SR / CVP 10/ MAP 62/ Hct 31.1/ Lactate 1.7  [x] Vasopressin 0.051 Units  Continuos reassessment of hemodynamics   [x] Statin   Aflutter s/p DCCV x2  [x] AC Therapy with Argatroban gtt pAF/flutter   HIT NG, MARY pending, f/u result  [x] Amiodarone gtt and digoxin for afib prophylaxis /rate control      ***Pulmonary***  Post op vent management   Titration of FiO2 and PEEP, follow SpO2, CXR, blood gasses   Plan to CPAP in AM to assess     Mode: AC/ CMV (Assist Control/ Continuous Mandatory Ventilation)  RR (machine): 14  FiO2: 40  PEEP: 10  ITime: 1  MAP: 15  PC: 18  PIP: 28              ***GI***  [x] NPO with TF's @goal of 65cc/hr   [x] Protonix    Bowel regimen with Miralax and senna       ***Renal***  ERIC, renal following CVVHD started , held post procedure today, will re-evaluate resuming, currently no acute indication tonight  CVP goal 10-12  Continue to monitor I/Os, BUN/Creatinine.   Replete lytes PRN  De Dios present      ***ID***  Zinacef for periop coverage  febrile this evening, BCx and procalcitonin ordered, f/u results    ***Endocrine***  [x]  DM2: HbA1c 7.3%                - [x] Insulin gtt              - Need tight glycemic control to prevent wound infection.      Patient requires continuous monitoring with bedside rhythm monitoring, pulse oximetry monitoring, and continuous central venous and arterial pressure monitoring; and intermittent blood gas analysis. Care plan discussed with the ICU care team.   Patient remained critical, at risk for life threatening decompensation.    I have spent 30 minutes providing critical care management to this patient.    By signing my name below, I, Erin Palafox, attest that this documentation has been prepared under the direction and in the presence of Bela Sanchez NP.  Electronically signed: Brian Hunt, 22 @ 21:46    I, Bela Sanchez , personally performed the services described in this documentation. all medical record entries made by the karlaibkyle were at my direction and in my presence. I have reviewed the chart and agree that the record reflects my personal performance and is accurate and complete  Electronically signed: Bela Sanchez NP. Patient seen and examined at the bedside.    Remained critically ill on continuous ICU monitoring.    24 hour events:   ECMO decannulation today, received 2 PRBC  Aflutter, DCCV x2-->SR,  DC, Amio bolus + gtt and dig loaded  noted to have soft palate bleed, ENT scope- stable   CVVHD not resumed post-op, electrolytes stable    OBJECTIVE:  Vital Signs Last 24 Hrs  T(C): 37.9 (08 Dec 2022 20:00), Max: 38.1 (08 Dec 2022 18:00)  T(F): 100.2 (08 Dec 2022 20:00), Max: 100.6 (08 Dec 2022 18:00)  HR: 81 (08 Dec 2022 21:30) (74 - 134)  BP: 108/54 (08 Dec 2022 07:40) (108/54 - 108/54)  BP(mean): 76 (08 Dec 2022 07:40) (76 - 76)  RR: 3 (08 Dec 2022 19:45) (3 - 29)  SpO2: 100% (08 Dec 2022 21:30) (95% - 100%)    Parameters below as of 08 Dec 2022 20:00  Patient On (Oxygen Delivery Method): ventilator    O2 Concentration (%): 40        Physical Exam:   General: Intubated, multiple lines gtt  Neurology: Sedated  Eyes: bilateral pupils equal and reactive   ENT/Neck: +ETT midline, Neck supple, trachea midline, No JVD   Respiratory: rhonchi bilaterally   CV: S1S2, no murmurs        [x] Sinus rhythm  Abdominal: Softly distended,+BS   Extremities: 1+ pedal edema noted, + peripheral pulses palpable, warm  Skin: No Rashes, Hematoma, Ecchymosis                    Assessment:  61 y/o male with PMH of CABG, DM, HTN, HLD presenting as transfer from [place] for c/f STEMI. PTA arrival received 325 aspirin, 600 plavix, and no heparin drip started. Around 1:30 am patient had multiple episodes of non-bloody diarrhea and emesis with associated abdominal pain and chest pain, unable to localize, non-radiating, with associated SOB and no associated palpitations or diaphoresis. No recent fevers/chills, cough, rashes, or changes in urination. Does report mild diffuse back pain; started 5 days ago in setting on injury while walking and lifting objects. Denies focal weakness, numbness/tingling, or LOC due does report mild dizziness.    acute rspiratory distress , cardiogenic shock s/p [facility] ECMO   Hemodynamic instability  Hypovolemia  Acute blood loss anemia  acute pancreatitis      Plan:   ***Neuro***  CT head showed 4mm subdural hematoma : repeat CT head unchanged   [x] Sedated with [x] Precedex for vent synchrony  Post operative neuro assessment   Soft palate laceration, ENT scoped , stable, no acute intervention at this time    ***Cardiovascular***  12/3 TTE: EF 30-35%, moderate-severe LV dysfunction  Invasive hemodynamic monitoring, assess perfusion indices    TTE turndown EF 30-40%, normal RV, MR mod   [x]  cath patent LIMA graft and other grafts occluded, femoral IABP placed, with good augmentation @ 1:1   [x]  ECMO d/c'd  SR / CVP 10/ MAP 62/ Hct 31.1/ Lactate 1.7  [x] Vasopressin 0.051 Units  Continuos reassessment of hemodynamics   [x] Statin   Aflutter s/p DCCV x2  [x] AC Therapy with Argatroban gtt pAF/flutter   HIT NG, MARY pending, f/u result  [x] Amiodarone gtt and digoxin for afib prophylaxis /rate control      ***Pulmonary***  Post op vent management   Titration of FiO2 and PEEP, follow SpO2, CXR, blood gasses   Plan to CPAP in AM to assess     Mode: AC/ CMV (Assist Control/ Continuous Mandatory Ventilation)  RR (machine): 14  FiO2: 40  PEEP: 10  ITime: 1  MAP: 15  PC: 18  PIP: 28              ***GI***  [x] NPO with TF's @goal of 65cc/hr   [x] Protonix    Bowel regimen with Miralax and senna       ***Renal***  ERIC, renal following CVVHD started , held post procedure today, will re-evaluate resuming, currently no acute indication tonight  CVP goal 10-12  Continue to monitor I/Os, BUN/Creatinine.   Replete lytes PRN  De Dios present      ***ID***  Zinacef for periop coverage  febrile this evening, BCx and procalcitonin ordered, f/u results    ***Endocrine***  [x]  DM2: HbA1c 7.3%                - [x] Insulin gtt              - Need tight glycemic control to prevent wound infection.      Patient requires continuous monitoring with bedside rhythm monitoring, pulse oximetry monitoring, and continuous central venous and arterial pressure monitoring; and intermittent blood gas analysis. Care plan discussed with the ICU care team.   Patient remained critical, at risk for life threatening decompensation.    I have spent 30 minutes providing critical care management to this patient.    By signing my name below, I, Erin Palafox, attest that this documentation has been prepared under the direction and in the presence of Bela Sanchez NP.  Electronically signed: Brian Hunt, 22 @ 21:46    I, Bela Sanchez , personally performed the services described in this documentation. all medical record entries made by the [email or web address] were at my direction and in my presence. I have reviewed the chart and agree that the record reflects my personal performance and is accurate and complete  Electronically signed: Bela Sanchez NP.

## 2022-12-08 NOTE — PROGRESS NOTE ADULT - ASSESSMENT
63 YO M with a history of CAD s/p 4v CABG ~2019 in [facility], DM2 (A1c 7.3%), and HTN who initially presented to OSH with abdominal pain and n/v and found to have pancreatitis with lipase > 3000 though also with elevated troponins. CT abdomen revealed acute pancreatitis and his initial LVEF was 40-45%. He developed MSOF with hypoxic respiratory failure requiring intubation and ERIC and went into hypoxic PEA arrest requiring ACLS and due to persistent hypoxia and hypotension on pressors after ROSC was cannulated to [facility] ECMO (LFV, RFA, no anterograde perfusion catheter) on 11/25. Notably CTH that day revealed small subdural hemorrhage.     His post arrest TTE on 11/26 showed a signficantly worse LVEF of 5-10% with an AV that was only minimally opening. Since then he has had improvement in his LV function (now ~35-40%) and improved pulsatility while tolerating progressive slow ECMO weans. ECMO turndown (note in chart 11/30) was reassuring for ability to decannulate to IABP/inotropes. C 12/06 showed closure of his 3 vein grafts with graft to LAD patent though with distal native disease. IABP placed, planning for ECMO weaning. His ARDS is improving. Currently on CVVH.    Previous cardiac studies:  LHC (12/06/22) - patent LIMA-LAD, RCA , LCx , aortogram w/ closure of 3 vein grafts, LVEDP 22 mm Hg, left-to-left and left-to-right collaterals  TTE (12/03/22) - mod-to-sev segmental LVSD (inferolateral, inferior, inferoseptal hypokinesis)    Hemodynamics:  12/07 IABP 1:1 ECMO 3600 RPM 4.0 LPM, aMAP 90, aDBP 123, mVO2 66.8%, CVP 7, CO 6.8, CI 3.3    *** INCOMPLETE NOTE *** INCOMPLETE NOTE *** INCOMPLETE NOTE *** INCOMPLETE NOTE *** INCOMPLETE NOTE *** INCOMPLETE NOTE *** INCOMPLETE NOTE *** INCOMPLETE NOTE *** INCOMPLETE NOTE *** INCOMPLETE NOTE *** INCOMPLETE NOTE *** INCOMPLETE NOTE *** INCOMPLETE NOTE *** INCOMPLETE NOTE *** INCOMPLETE NOTE *** INCOMPLETE NOTE *** INCOMPLETE NOTE *** INCOMPLETE NOTE *** INCOMPLETE NOTE *** INCOMPLETE NOTE ***  RECS BELOW ARE NOT TO BE FOLLOWED UNTIL FINALIZED  argat gtt, atorva 40, CRRT, dexmed gtt, insulin gtt, norepi gtt, NS 5/hr    Problem/Plan - 1:  ·  Problem: Non-ST elevation MI (NSTEMI).   ·  Plan:   - troponin peaked at > 36485   - [facility] 12/06 with IABP placement revealed that 3 grafts are closed w/ distal native disease from remaining LAD graft  - continue statin  - would start ASA when okay with surgical team.    Problem/Plan - 2:  ·  Problem: Gall stone pancreatitis.   ·  Plan:   - CT abd showing acute pancreatitis  - RUQ showing sludge, no stones  - lipase > 3000 11/24, normalized prior and now fluctuating, today improved  - conservative care  - appreciate GI recs, no intervention planned   - surgery following   - off of aBx, appreciate ID recs.    Problem/Plan - 3:  ·  Problem: ERIC (acute kidney injury).   ·  Plan:   - likely arrest associated ATN and now worsened from hypovolemia   - stop diuretics and aim to keep net even to positive   - renal following, c/t CVVH.    Problem/Plan - 4:  ·  Problem: Cardiogenic shock.   ·  Plan:   - continue VA ECMO (LFV/RFA, no anterograde perfusion). monitor distal pulses closely  - ECMO wean performed on 11/30 and favorable, now that IABP in place will re-attempt ECMO wean this evening  - remains pulsatile  - cont argatroban for AC while on ECMO, would titrate pressors down to target augmented MAPs 70s, will titrate GDMT as appropriate if above goal off pressors.    Problem/Plan - 5:  ·  Problem: Nontraumatic subdural hematoma.   ·  Plan:   - small 3mm subdural hemorrhage stable on repeat CTs  - okay to continue argatroban, appreciate neuro recs  - keep MAP < 75 mmHg and careful monitoring of PTT  - repeat CT head today to assess for interval changes.    Problem/Plan - 6:  ·  Problem: Acute respiratory failure with hypoxia.   ·  Plan:   - CXR improved with more lung volumes after bronch and increased PEEP  - bronch showed erythematous airways with scant bleeding  - extubate when able  - continue checking right radial ABG.  - continue treatment for pancreatitis related ARDS  - caution with propofol gtt given pancreatis, would follow lipids.    Responsive to commands. Able to move all extremities. On exam, NAD, JVP approx 6 cm w/ HJR, RRR, CTA anterior lung fields, abdomen soft with bowel sounds, no edema, pulses intact. Labs reviewed - Na 135 (improved), BUN/Cr 61/2.51 (on HD), albumin 3.0, amylase/lipase stably elevated (improved from prior). LHC 12/6 revealed patent GUTIÉRREZ to LAD,  of RCA/LCx with L to L and L to R collaterals; aortogram revealed no filling of any other grafts. ECMO wean study done which he tolerated down to 1.5-2 LPM with unchanged EF at 35% and possibly moderate MR.   - c/w CVVH; goal net even to negative  - will plan for decannulation tomorrow  - c/w anticoagulation  - on statin  - IABP in place to allow weaning; once decannulated, will attempt to medically optimize   - prognosis guarded; family meeting held 12/6 and discussed at length current state and next steps with plan to f/u next week .    61 YO M with a history of CAD s/p 4v CABG ~2019 in [facility], DM2 (A1c 7.3%), and HTN who initially presented to OSH with abdominal pain and n/v and found to have pancreatitis with lipase > 3000 though also with elevated troponins. CT abdomen revealed acute pancreatitis and his initial LVEF was 40-45%. He developed MSOF with hypoxic respiratory failure requiring intubation and ERIC and went into hypoxic PEA arrest requiring ACLS and due to persistent hypoxia and hypotension on pressors after ROSC was cannulated to VA ECMO (LFV, RFA, no anterograde perfusion catheter) on 11/25. Notably CTH that day revealed small subdural hemorrhage.     His post arrest TTE on 11/26 showed a signficantly worse LVEF of 5-10% with an AV that was only minimally opening. Since then he has had improvement in his LV function (now ~35-40%) and improved pulsatility while tolerating progressive slow ECMO weans. ECMO turndown (note in chart 11/30) was reassuring for ability to decannulate to IABP/inotropes. C 12/06 showed closure of his 3 vein grafts with graft to LAD patent though with distal native disease. IABP placed, planning for ECMO weaning. His ARDS is improving. Currently on CVVH.    Previous cardiac studies:  LHC (12/06/22) - patent LIMA-LAD, RCA , LCx , aortogram w/ closure of 3 vein grafts, LVEDP 22 mm Hg, left-to-left and left-to-right collaterals  TTE (12/03/22) - mod-to-sev segmental LVSD (inferolateral, inferior, inferoseptal hypokinesis)    Hemodynamics:  12/07 IABP 1:1 ECMO 3600 RPM 4.0 LPM, aMAP 90, aDBP 123, mVO2 66.8%, CVP 7, CO 6.8, CI 3.3    *** INCOMPLETE NOTE *** INCOMPLETE NOTE *** INCOMPLETE NOTE *** INCOMPLETE NOTE *** INCOMPLETE NOTE *** INCOMPLETE NOTE *** INCOMPLETE NOTE *** INCOMPLETE NOTE *** INCOMPLETE NOTE *** INCOMPLETE NOTE *** INCOMPLETE NOTE *** INCOMPLETE NOTE *** INCOMPLETE NOTE *** INCOMPLETE NOTE *** INCOMPLETE NOTE *** INCOMPLETE NOTE *** INCOMPLETE NOTE *** INCOMPLETE NOTE *** INCOMPLETE NOTE *** INCOMPLETE NOTE ***  RECS BELOW ARE NOT TO BE FOLLOWED UNTIL FINALIZED  argat gtt, atorva 40, CRRT, dexmed gtt, insulin gtt, norepi gtt, NS 5/hr    Problem/Plan - 1:  ·  Problem: Non-ST elevation MI (NSTEMI).   ·  Plan:   - troponin peaked at > 58667   - [facility] 12/06 with IABP placement revealed that 3 grafts are closed w/ distal native disease from remaining LAD graft  - continue statin  - would start ASA when okay with surgical team.    Problem/Plan - 2:  ·  Problem: Gall stone pancreatitis.   ·  Plan:   - CT abd showing acute pancreatitis  - RUQ showing sludge, no stones  - lipase > 3000 11/24, normalized prior and now fluctuating, today improved  - conservative care  - appreciate GI recs, no intervention planned   - surgery following   - off of aBx, appreciate ID recs.    Problem/Plan - 3:  ·  Problem: ERIC (acute kidney injury).   ·  Plan:   - likely arrest associated ATN and now worsened from hypovolemia   - stop diuretics and aim to keep net even to positive   - renal following, c/t CVVH.    Problem/Plan - 4:  ·  Problem: Cardiogenic shock.   ·  Plan:   - continue VA ECMO (LFV/RFA, no anterograde perfusion). monitor distal pulses closely  - ECMO wean performed on 11/30 and favorable, now that IABP in place will re-attempt ECMO wean this evening  - remains pulsatile  - cont argatroban for AC while on ECMO, would titrate pressors down to target augmented MAPs 70s, will titrate GDMT as appropriate if above goal off pressors.    Problem/Plan - 5:  ·  Problem: Nontraumatic subdural hematoma.   ·  Plan:   - small 3mm subdural hemorrhage stable on repeat CTs  - okay to continue argatroban, appreciate neuro recs  - keep MAP < 75 mmHg and careful monitoring of PTT  - repeat CT head today to assess for interval changes.    Problem/Plan - 6:  ·  Problem: Acute respiratory failure with hypoxia.   ·  Plan:   - CXR improved with more lung volumes after bronch and increased PEEP  - bronch showed erythematous airways with scant bleeding  - extubate when able  - continue checking right radial ABG.  - continue treatment for pancreatitis related ARDS  - caution with propofol gtt given pancreatis, would follow lipids.    Responsive to commands. Able to move all extremities. On exam, NAD, JVP approx 6 cm w/ HJR, RRR, CTA anterior lung fields, abdomen soft with bowel sounds, no edema, pulses intact. Labs reviewed - Na 135 (improved), BUN/Cr 61/2.51 (on HD), albumin 3.0, amylase/lipase stably elevated (improved from prior). LHC 12/6 revealed patent GUTIÉRREZ to LAD,  of RCA/LCx with L to L and L to R collaterals; aortogram revealed no filling of any other grafts. ECMO wean study done which he tolerated down to 1.5-2 LPM with unchanged EF at 35% and possibly moderate MR.   - c/w CVVH; goal net even to negative  - will plan for decannulation tomorrow  - c/w anticoagulation  - on statin  - IABP in place to allow weaning; once decannulated, will attempt to medically optimize   - prognosis guarded; family meeting held 12/6 and discussed at length current state and next steps with plan to f/u next week .    63 YO M with a history of CAD s/p 4v CABG ~2019 in [facility], DM2 (A1c 7.3%), and HTN who initially presented to OSH with abdominal pain and n/v and found to have pancreatitis with lipase > 3000 though also with elevated troponins. CT abdomen revealed acute pancreatitis and his initial LVEF was 40-45%. He developed MSOF with hypoxic respiratory failure requiring intubation and ERIC and went into hypoxic PEA arrest requiring ACLS and due to persistent hypoxia and hypotension on pressors after ROSC was cannulated to VA ECMO (LFV, RFA, no anterograde perfusion catheter) on 11/25. Notably CTH that day revealed small subdural hemorrhage.     His post arrest TTE on 11/26 showed a signficantly worse LVEF of 5-10% with an AV that was only minimally opening. Since then he has had improvement in his LV function (now ~35-40%) and improved pulsatility while tolerating progressive slow ECMO weans. ECMO turndown (note in chart 11/30) was reassuring for ability to decannulate to IABP/inotropes. C 12/06 showed closure of his 3 vein grafts with graft to LAD patent though with distal native disease. IABP placed, planning for ECMO weaning. His ARDS is improving. Currently on CVVH.    Previous cardiac studies:  LHC (12/06/22) - patent LIMA-LAD, RCA , LCx , aortogram w/ closure of 3 vein grafts, LVEDP 22 mm Hg, left-to-left and left-to-right collaterals  TTE (12/03/22) - mod-to-sev segmental LVSD (inferolateral, inferior, inferoseptal hypokinesis)    Hemodynamics:  12/07 IABP 1:1 ECMO 3600 RPM 4.0 LPM, aMAP 90, aDBP 123, mVO2 66.8%, CVP 7, CO 6.8, CI 3.3    *** INCOMPLETE NOTE *** INCOMPLETE NOTE *** INCOMPLETE NOTE *** INCOMPLETE NOTE *** INCOMPLETE NOTE *** INCOMPLETE NOTE *** INCOMPLETE NOTE *** INCOMPLETE NOTE *** INCOMPLETE NOTE *** INCOMPLETE NOTE *** INCOMPLETE NOTE *** INCOMPLETE NOTE *** INCOMPLETE NOTE *** INCOMPLETE NOTE *** INCOMPLETE NOTE *** INCOMPLETE NOTE *** INCOMPLETE NOTE *** INCOMPLETE NOTE *** INCOMPLETE NOTE *** INCOMPLETE NOTE ***  RECS BELOW ARE NOT TO BE FOLLOWED UNTIL FINALIZED  argat gtt, atorva 40, CRRT, dexmed gtt, insulin gtt, norepi gtt, NS 5/hr    Problem/Plan - 1:  ·  Problem: Non-ST elevation MI (NSTEMI).   ·  Plan:   - troponin peaked at > 16017   - [facility] 12/06 with IABP placement revealed that 3 grafts are closed w/ distal native disease from remaining LAD graft  - continue statin  - would start ASA when okay with surgical team.    Problem/Plan - 2:  ·  Problem: Gall stone pancreatitis.   ·  Plan:   - CT abd showing acute pancreatitis  - RUQ showing sludge, no stones  - lipase > 3000 11/24, normalized prior and now fluctuating, today improved  - conservative care  - appreciate GI recs, no intervention planned   - surgery following   - off of aBx, appreciate ID recs.    Problem/Plan - 3:  ·  Problem: ERIC (acute kidney injury).   ·  Plan:   - likely arrest associated ATN and now worsened from hypovolemia   - stop diuretics and aim to keep net even to positive   - renal following, c/t CVVH.    Problem/Plan - 4:  ·  Problem: Cardiogenic shock.   ·  Plan:   - continue VA ECMO (LFV/RFA, no anterograde perfusion). monitor distal pulses closely  - ECMO wean performed on 11/30 and favorable, now that IABP in place will re-attempt ECMO wean this evening  - remains pulsatile  - cont argatroban for AC while on ECMO, would titrate pressors down to target augmented MAPs 70s, will titrate GDMT as appropriate if above goal off pressors.    Problem/Plan - 5:  ·  Problem: Nontraumatic subdural hematoma.   ·  Plan:   - small 3mm subdural hemorrhage stable on repeat CTs  - okay to continue argatroban, appreciate neuro recs  - keep MAP < 75 mmHg and careful monitoring of PTT  - repeat CT head today to assess for interval changes.    Problem/Plan - 6:  ·  Problem: Acute respiratory failure with hypoxia.   ·  Plan:   - CXR improved with more lung volumes after bronch and increased PEEP  - bronch showed erythematous airways with scant bleeding  - extubate when able  - continue checking right radial ABG.  - continue treatment for pancreatitis related ARDS  - caution with propofol gtt given pancreatis, would follow lipids.    Responsive to commands. Able to move all extremities. On exam, NAD, JVP approx 6 cm w/ HJR, RRR, CTA anterior lung fields, abdomen soft with bowel sounds, no edema, pulses intact. Labs reviewed - Na 135 (improved), BUN/Cr 61/2.51 (on HD), albumin 3.0, amylase/lipase stably elevated (improved from prior). LHC 12/6 revealed patent GUTIÉRREZ to LAD,  of RCA/LCx with L to L and L to R collaterals; aortogram revealed no filling of any other grafts. ECMO wean study done which he tolerated down to 1.5-2 LPM with unchanged EF at 35% and possibly moderate MR.   - c/w CVVH; goal net even to negative  - will plan for decannulation tomorrow  - c/w anticoagulation  - on statin  - IABP in place to allow weaning; once decannulated, will attempt to medically optimize   - prognosis guarded; family meeting held 12/6 and discussed at length current state and next steps with plan to f/u next week .    63 YO M with a history of CAD s/p 4v CABG ~2019 in [facility], DM2 (A1c 7.3%), and HTN who initially presented to OSH with abdominal pain and n/v and found to have pancreatitis with lipase > 3000 though also with elevated troponins. CT abdomen revealed acute pancreatitis and his initial LVEF was 40-45%. He developed MSOF with hypoxic respiratory failure requiring intubation and ERIC and went into hypoxic PEA arrest requiring ACLS and due to persistent hypoxia and hypotension on pressors after ROSC was cannulated to VA ECMO (LFV, RFA, no anterograde perfusion catheter) on 11/25. Notably CTH that day revealed small subdural hemorrhage.     His post arrest TTE on 11/26 showed a signficantly worse LVEF of 5-10% with an AV that was only minimally opening. Since then he has had improvement in his LV function (now ~35-40%) and improved pulsatility while tolerating progressive slow ECMO weans. ECMO turndown (note in chart 11/30) was reassuring for ability to decannulate to IABP/inotropes. [facility] 12/06 showed closure of his 3 vein grafts with graft to LAD patent though with distal native disease. IABP placed, planning for ECMO weaning. His ARDS is improving. Currently on CVVH.    Previous cardiac studies:  [facility] (12/06/22) - patent LIMA-LAD, RCA , LCx , aortogram w/ closure of 3 vein grafts, LVEDP 22 mm Hg, left-to-left and left-to-right collaterals  TTE (12/03/22) - mod-to-sev segmental LVSD (inferolateral, inferior, inferoseptal hypokinesis)    Hemodynamics:  12/08 IABP 1:1, aMAP 120, aMAP 87, CVP 5  12/07 IABP 1:1 ECMO 3600 RPM 4.0 LPM, aMAP 90, aDBP 123, mVO2 66.8%, CVP 7, CO 6.8, CI 3.3        Problem/Plan - 1:  ·  Problem: Non-ST elevation MI (NSTEMI).   ·  Plan:   - troponin peaked at > 09463   - [facility] 12/06 with IABP placement revealed that 3 grafts are closed w/ distal native disease from remaining LAD graft  - continue statin  - would start ASA when okay with surgical team.    Problem/Plan - 2:  ·  Problem: Gall stone pancreatitis.   ·  Plan:   - CT abd showing acute pancreatitis  - RUQ showing sludge, no stones  - lipase > 3000 11/24, normalized prior and now fluctuating  - conservative care  - appreciate GI recs, no intervention planned   - surgery following   - off of aBx, appreciate ID recs.    Problem/Plan - 3:  ·  Problem: ERIC (acute kidney injury).   ·  Plan:   - likely arrest associated ATN and now worsened from hypovolemia   - stop diuretics and aim to keep net even to positive   - renal following, c/t CVVH.    Problem/Plan - 4:  ·  Problem: Cardiogenic shock.   ·  Plan:   - ECMO decannulated 12/08 after successful wean  - IABP in place, keep 1:1, target augmented MAP 70s  - remains pulsatile  - cont argatroban for AC while on IABP  - will titrate GDMT as appropriate if above goal off pressors.    Problem/Plan - 5:  ·  Problem: Nontraumatic subdural hematoma.   ·  Plan:   - small 3mm subdural hemorrhage stable on repeat CTs  - okay to continue argatroban, appreciate neuro recs  - keep MAP < 75 mmHg and careful monitoring of PTT    Problem/Plan - 6:  ·  Problem: Acute respiratory failure with hypoxia.   ·  Plan:   - CXR improved with more lung volumes after bronch and increased PEEP  - bronch showed erythematous airways with scant bleeding  - extubate when able  - continue checking right radial ABG.  - continue treatment for pancreatitis related ARDS  - caution with propofol gtt given pancreatis, would follow lipids.   61 YO M with a history of CAD s/p 4v CABG ~2019 in [facility], DM2 (A1c 7.3%), and HTN who initially presented to OSH with abdominal pain and n/v and found to have pancreatitis with lipase > 3000 though also with elevated troponins. CT abdomen revealed acute pancreatitis and his initial LVEF was 40-45%. He developed MSOF with hypoxic respiratory failure requiring intubation and ERIC and went into hypoxic PEA arrest requiring ACLS and due to persistent hypoxia and hypotension on pressors after ROSC was cannulated to VA ECMO (LFV, RFA, no anterograde perfusion catheter) on 11/25. Notably CTH that day revealed small subdural hemorrhage.     His post arrest TTE on 11/26 showed a signficantly worse LVEF of 5-10% with an AV that was only minimally opening. Since then he has had improvement in his LV function (now ~35-40%) and improved pulsatility while tolerating progressive slow ECMO weans. ECMO turndown (note in chart 11/30) was reassuring for ability to decannulate to IABP/inotropes. [facility] 12/06 showed closure of his 3 vein grafts with graft to LAD patent though with distal native disease. IABP placed, planning for ECMO weaning. His ARDS is improving. Currently on CVVH.    Previous cardiac studies:  [facility] (12/06/22) - patent LIMA-LAD, RCA , LCx , aortogram w/ closure of 3 vein grafts, LVEDP 22 mm Hg, left-to-left and left-to-right collaterals  TTE (12/03/22) - mod-to-sev segmental LVSD (inferolateral, inferior, inferoseptal hypokinesis)    Hemodynamics:  12/08 IABP 1:1, aMAP 120, aMAP 87, CVP 5  12/07 IABP 1:1 ECMO 3600 RPM 4.0 LPM, aMAP 90, aDBP 123, mVO2 66.8%, CVP 7, CO 6.8, CI 3.3        Problem/Plan - 1:  ·  Problem: Non-ST elevation MI (NSTEMI).   ·  Plan:   - troponin peaked at > 45827   - [facility] 12/06 with IABP placement revealed that 3 grafts are closed w/ distal native disease from remaining LAD graft  - continue statin  - would start ASA when okay with surgical team.    Problem/Plan - 2:  ·  Problem: Gall stone pancreatitis.   ·  Plan:   - CT abd showing acute pancreatitis  - RUQ showing sludge, no stones  - lipase > 3000 11/24, normalized prior and now fluctuating  - conservative care  - appreciate GI recs, no intervention planned   - surgery following   - off of aBx, appreciate ID recs.    Problem/Plan - 3:  ·  Problem: ERIC (acute kidney injury).   ·  Plan:   - likely arrest associated ATN and now worsened from hypovolemia   - stop diuretics and aim to keep net even to positive   - renal following, c/t CVVH.    Problem/Plan - 4:  ·  Problem: Cardiogenic shock.   ·  Plan:   - ECMO decannulated 12/08 after successful wean  - IABP in place, keep 1:1, target augmented MAP 70s  - remains pulsatile  - cont argatroban for AC while on IABP  - will titrate GDMT as appropriate if above goal off pressors.    Problem/Plan - 5:  ·  Problem: Nontraumatic subdural hematoma.   ·  Plan:   - small 3mm subdural hemorrhage stable on repeat CTs  - okay to continue argatroban, appreciate neuro recs  - keep MAP < 75 mmHg and careful monitoring of PTT    Problem/Plan - 6:  ·  Problem: Acute respiratory failure with hypoxia.   ·  Plan:   - CXR improved with more lung volumes after bronch and increased PEEP  - bronch showed erythematous airways with scant bleeding  - extubate when able  - continue checking right radial ABG.  - continue treatment for pancreatitis related ARDS  - caution with propofol gtt given pancreatis, would follow lipids.   61 YO M with a history of CAD s/p 4v CABG ~2019 in [facility], DM2 (A1c 7.3%), and HTN who initially presented to OSH with abdominal pain and n/v and found to have pancreatitis with lipase > 3000 though also with elevated troponins. CT abdomen revealed acute pancreatitis and his initial LVEF was 40-45%. He developed MSOF with hypoxic respiratory failure requiring intubation and ERIC and went into hypoxic PEA arrest requiring ACLS and due to persistent hypoxia and hypotension on pressors after ROSC was cannulated to VA ECMO (LFV, RFA, no anterograde perfusion catheter) on 11/25. Notably CTH that day revealed small subdural hemorrhage.     His post arrest TTE on 11/26 showed a signficantly worse LVEF of 5-10% with an AV that was only minimally opening. Since then he has had improvement in his LV function (now ~35-40%) and improved pulsatility while tolerating progressive slow ECMO weans. ECMO turndown (note in chart 11/30) was reassuring for ability to decannulate to IABP/inotropes. [facility] 12/06 showed closure of his 3 vein grafts with graft to LAD patent though with distal native disease. IABP placed, planning for ECMO weaning. His ARDS is improving. Currently on CVVH.    Previous cardiac studies:  [facility] (12/06/22) - patent LIMA-LAD, RCA , LCx , aortogram w/ closure of 3 vein grafts, LVEDP 22 mm Hg, left-to-left and left-to-right collaterals  TTE (12/03/22) - mod-to-sev segmental LVSD (inferolateral, inferior, inferoseptal hypokinesis)    Hemodynamics:  12/08 IABP 1:1, aMAP 120, aMAP 87, CVP 5  12/07 IABP 1:1 ECMO 3600 RPM 4.0 LPM, aMAP 90, aDBP 123, mVO2 66.8%, CVP 7, CO 6.8, CI 3.3        Problem/Plan - 1:  ·  Problem: Non-ST elevation MI (NSTEMI).   ·  Plan:   - troponin peaked at > 42352   - [facility] 12/06 with IABP placement revealed that 3 grafts are closed w/ distal native disease from remaining LAD graft  - continue statin  - would start ASA when okay with surgical team.    Problem/Plan - 2:  ·  Problem: Gall stone pancreatitis.   ·  Plan:   - CT abd showing acute pancreatitis  - RUQ showing sludge, no stones  - lipase > 3000 11/24, normalized prior and now fluctuating  - conservative care  - appreciate GI recs, no intervention planned   - surgery following   - off of aBx, appreciate ID recs.    Problem/Plan - 3:  ·  Problem: ERIC (acute kidney injury).   ·  Plan:   - likely arrest associated ATN and now worsened from hypovolemia   - stop diuretics and aim to keep net even to positive   - renal following, c/t CVVH.    Problem/Plan - 4:  ·  Problem: Cardiogenic shock.   ·  Plan:   - ECMO decannulated 12/08 after successful wean  - IABP in place, keep 1:1, target augmented MAP 70s  - remains pulsatile  - cont argatroban for AC while on IABP  - will titrate GDMT as appropriate if above goal off pressors.    Problem/Plan - 5:  ·  Problem: Nontraumatic subdural hematoma.   ·  Plan:   - small 3mm subdural hemorrhage stable on repeat CTs  - okay to continue argatroban, appreciate neuro recs  - keep MAP < 75 mmHg and careful monitoring of PTT    Problem/Plan - 6:  ·  Problem: Acute respiratory failure with hypoxia.   ·  Plan:   - CXR improved with more lung volumes after bronch and increased PEEP  - bronch showed erythematous airways with scant bleeding  - extubate when able  - continue checking right radial ABG.  - continue treatment for pancreatitis related ARDS  - caution with propofol gtt given pancreatis, would follow lipids.   61 YO M with a history of CAD s/p 4v CABG ~2019 in [facility], DM2 (A1c 7.3%), and HTN who initially presented to OSH with abdominal pain and n/v and found to have pancreatitis with lipase > 3000 though also with elevated troponins. CT abdomen revealed acute pancreatitis and his initial LVEF was 40-45%. He developed MSOF with hypoxic respiratory failure requiring intubation and ERIC and went into hypoxic PEA arrest requiring ACLS and due to persistent hypoxia and hypotension on pressors after ROSC was cannulated to [facility] (LFV, RFA, no anterograde perfusion catheter) on 11/25. Notably CTH that day revealed small subdural hemorrhage.     His post arrest TTE on 11/26 showed a signficantly worse LVEF of 5-10% with an AV that was only minimally opening. Since then he has had improvement in his LV function (now ~35-40%) and improved pulsatility while tolerating progressive slow ECMO weans. ECMO turndown (note in chart 11/30) was reassuring for ability to decannulate to IABP/inotropes. C 12/06 showed closure of his 3 vein grafts with graft to LAD patent though with distal native disease. IABP placed, ECMO successful and decannulated 12/08 (transfused 2u pRBCs during). His ARDS is improving. Currently on CVVH.    Previous cardiac studies:  LHC (12/06/22) - patent LIMA-LAD, RCA , LCx , aortogram w/ closure of 3 vein grafts, LVEDP 22 mm Hg, left-to-left and left-to-right collaterals  TTE (12/03/22) - mod-to-sev segmental LVSD (inferolateral, inferior, inferoseptal hypokinesis)    Hemodynamics:  12/08 IABP 1:1, aMAP 120, aMAP 87, CVP 5  12/07 IABP 1:1 ECMO 3600 RPM 4.0 LPM, aMAP 90, aDBP 123, mVO2 66.8%, CVP 7, CO 6.8, CI 3.3 61 YO M with a history of CAD s/p 4v CABG ~2019 in [facility], DM2 (A1c 7.3%), and HTN who initially presented to OSH with abdominal pain and n/v and found to have pancreatitis with lipase > 3000 though also with elevated troponins. CT abdomen revealed acute pancreatitis and his initial LVEF was 40-45%. He developed MSOF with hypoxic respiratory failure requiring intubation and ERIC and went into hypoxic PEA arrest requiring ACLS and due to persistent hypoxia and hypotension on pressors after ROSC was cannulated to [facility] ECMO (LFV, RFA, no anterograde perfusion catheter) on 11/25. Notably CTH that day revealed small subdural hemorrhage.     His post arrest TTE on 11/26 showed a signficantly worse LVEF of 5-10% with an AV that was only minimally opening. Since then he has had improvement in his LV function (now ~35-40%) and improved pulsatility while tolerating progressive slow ECMO weans. ECMO turndown (note in chart 11/30) was reassuring for ability to decannulate to IABP/inotropes. C 12/06 showed closure of his 3 vein grafts with graft to LAD patent though with distal native disease. IABP placed, ECMO successful and decannulated 12/08 (transfused 2u pRBCs during). His ARDS is improving. Currently on CVVH.    Previous cardiac studies:  LHC (12/06/22) - patent LIMA-LAD, RCA , LCx , aortogram w/ closure of 3 vein grafts, LVEDP 22 mm Hg, left-to-left and left-to-right collaterals  TTE (12/03/22) - mod-to-sev segmental LVSD (inferolateral, inferior, inferoseptal hypokinesis)    Hemodynamics:  12/08 IABP 1:1, aMAP 120, aMAP 87, CVP 5  12/07 IABP 1:1 ECMO 3600 RPM 4.0 LPM, aMAP 90, aDBP 123, mVO2 66.8%, CVP 7, CO 6.8, CI 3.3 63 YO M with a history of CAD s/p 4v CABG ~2019 in [facility], DM2 (A1c 7.3%), and HTN who initially presented to OSH with abdominal pain and n/v and found to have pancreatitis with lipase > 3000 though also with elevated troponins. CT abdomen revealed acute pancreatitis and his initial LVEF was 40-45%. He developed MSOF with hypoxic respiratory failure requiring intubation and ERIC and went into hypoxic PEA arrest requiring ACLS and due to persistent hypoxia and hypotension on pressors after ROSC was cannulated to [facility] ECMO (LFV, RFA, no anterograde perfusion catheter) on 11/25. Notably CTH that day revealed small subdural hemorrhage.     His post arrest TTE on 11/26 showed a signficantly worse LVEF of 5-10% with an AV that was only minimally opening. Since then he has had improvement in his LV function (now ~35-40%) and improved pulsatility while tolerating progressive slow ECMO weans. ECMO turndown (note in chart 11/30) was reassuring for ability to decannulate to IABP/inotropes. C 12/06 showed closure of his 3 vein grafts with graft to LAD patent though with distal native disease. IABP placed, ECMO successful and decannulated 12/08 (transfused 2u pRBCs during). His ARDS is improving. Currently on CVVH.    Previous cardiac studies:  LHC (12/06/22) - patent LIMA-LAD, RCA , LCx , aortogram w/ closure of 3 vein grafts, LVEDP 22 mm Hg, left-to-left and left-to-right collaterals  TTE (12/03/22) - mod-to-sev segmental LVSD (inferolateral, inferior, inferoseptal hypokinesis)    Hemodynamics:  12/08 IABP 1:1, aMAP 120, aMAP 87, CVP 5  12/07 IABP 1:1 ECMO 3600 RPM 4.0 LPM, aMAP 90, aDBP 123, mVO2 66.8%, CVP 7, CO 6.8, CI 3.3

## 2022-12-08 NOTE — PROGRESS NOTE ADULT - SUBJECTIVE AND OBJECTIVE BOX
CRITICAL CARE ATTENDING - CTICU    MEDICATIONS  (STANDING):  argatroban Infusion 0.13 MICROgram(s)/kG/Min (0.73 mL/Hr) IV Continuous <Continuous>  artificial  tears Solution 1 Drop(s) Both EYES four times a day  atorvastatin 40 milliGRAM(s) Oral at bedtime  chlorhexidine 0.12% Liquid 15 milliLiter(s) Oral Mucosa every 12 hours  chlorhexidine 2% Cloths 1 Application(s) Topical <User Schedule>  CRRT Treatment    <Continuous>  dexMEDEtomidine Infusion 1.5 MICROgram(s)/kG/Hr (34.8 mL/Hr) IV Continuous <Continuous>  dextrose 50% Injectable 50 milliLiter(s) IV Push every 15 minutes  HYDROmorphone  Injectable 0.5 milliGRAM(s) IV Push every 6 hours  insulin regular Infusion 6 Unit(s)/Hr (6 mL/Hr) IV Continuous <Continuous>  norepinephrine Infusion 0.05 MICROgram(s)/kG/Min (8.71 mL/Hr) IV Continuous <Continuous>  pantoprazole  Injectable 40 milliGRAM(s) IV Push two times a day  polyethylene glycol 3350 17 Gram(s) Oral two times a day  PrismaSATE Dialysate BGK 4 / 2.5 5000 milliLiter(s) (1800 mL/Hr) CRRT <Continuous>  PrismaSOL Filtration BGK 4 / 2.5 5000 milliLiter(s) (800 mL/Hr) CRRT <Continuous>  PrismaSOL Filtration BGK 4 / 2.5 5000 milliLiter(s) (200 mL/Hr) CRRT <Continuous>  senna 2 Tablet(s) Oral at bedtime  sodium chloride 0.9%. 1000 milliLiter(s) (5 mL/Hr) IV Continuous <Continuous>                                    8.1    13.33 )-----------( 163      ( 08 Dec 2022 00:36 )             25.0       12-08    136  |  104  |  32<H>  ----------------------------<  125<H>  4.7   |  22  |  1.66<H>    Ca    8.0<L>      08 Dec 2022 00:36  Phos  2.1     12-08  Mg     3.0     12-08    TPro  6.1  /  Alb  2.8<L>  /  TBili  0.9  /  DBili  x   /  AST  63<H>  /  ALT  10  /  AlkPhos  249<H>  12-      PT/INR - ( 07 Dec 2022 01:35 )   PT: 15.0 sec;   INR: 1.30 ratio         PTT - ( 08 Dec 2022 00:36 )  PTT:44.7 sec    Mode: AC/ CMV (Assist Control/ Continuous Mandatory Ventilation)  RR (machine): 14  FiO2: 40  PEEP: 12  PS: 40  ITime: 1  MAP: 16  PC: 10  PIP: 23      Daily     Daily Weight in k.6 (08 Dec 2022 00:00)       @ 07:01  -   @ 07:00  --------------------------------------------------------  IN: 2067.2 mL / OUT: 3058 mL / NET: -990.8 mL        Critically Ill patient  : [ ] preoperative ,   [ ] post operative  [x] Non Operative    Requires :  [x ] Arterial Line   [ x] Central Line  [ ] PA catheter  [x] IABP  [x ] ECMO  [ ] LVAD  [x ] Ventilator  [ ] pacemaker [ ] Impella.                      [x ] ABG's     [x ] Pulse Oxymetry Monitoring  Bedside evaluation , monitoring , treatment of hemodynamics , fluids , IVP/ IVCD meds.        Diagnosis:      - [facility] - arrest in [facility]     POD 2- IABP placed in cath lab     MI     Acute Pancreatitis     ARDS     Hemodynamic lability,  instability. Requires IVCD [ x] vasopressors [ ] inotropes  [ ] vasodilator  [ x]IVSS fluid  to maintain MAP, perfusion, C.I.     Fluid  overload     Post Arrest Shock state    Hypotension     CHF- acute [ x]   chronic [ x]    systolic [ x]   diatolic [ ]          - Echo- EF -   30-40% Severe segmental LV systolic dysfunction          [ ] RV dysfunction     - Cxr-cardiomegally, edema          - Clinical-  [ ]inotropes   [x]pressors   [x ]diuresis   [x]IABP   [x]ECMO   [ ]LVAD   [x]Respiratory Failure.     Cardiogenic Shock - resolving    Renal Failure - Acute Kidney Injury     CVVHD     IVCD anticoagulation with [ ] Heparin  [x] Argatroban for  ECMO / MI    IABP   [x] management   [ ] wean 1:1 1:2 1:3   [ ] removal and f/u vascular checks     Hypernatremia     IVCD Insulin- DM    Requires bedside physical therapy, mobilization and total snf care.     IVCD Precedex  for vent synchrony     Respiratory Failure     Requires chest PT, pulmonary toilet, ambu bagging, suctioning to maintain SaO2,  patent airway and treat atelectasis.     Ventilator Management:  [x ]AC-rest    [ ]CPAP-PS Wean    [ ]Trach Collar     [ ]Extubate    [ ] T-Piece  [ ]peep>5     Difficult weaning process - multiple organ system involvement in critically ill patient     ECMO turn down / wean  echo              I, Finn Zelaya, personally performed the services described in this documentation. All medical record entries made by the scribe were at my direction and in my presence. I have reviewed the chart and agree that the record reflects my personal performance and is accurate and complete.   Finn Zelaya MD.       By signing my name below, I, Abhijit Ngo, attest that this documentation has been prepared under the direction and in the presence of Finn Zelaya MD.   Electronically Signed: Brian Slaughter 22 @ 07:06        Discussed with CT surgeon, Physician Assistant - Nurse Practitioner- Critical care medicine team.   Dicussed at  AM / PM rounds.   Chart, labs , films reviewed.    Cumulative Critical Care Time Given Today:  CRITICAL CARE ATTENDING - CTICU    MEDICATIONS  (STANDING):  argatroban Infusion 0.13 MICROgram(s)/kG/Min (0.73 mL/Hr) IV Continuous <Continuous>  artificial  tears Solution 1 Drop(s) Both EYES four times a day  atorvastatin 40 milliGRAM(s) Oral at bedtime  chlorhexidine 0.12% Liquid 15 milliLiter(s) Oral Mucosa every 12 hours  chlorhexidine 2% Cloths 1 Application(s) Topical <User Schedule>  CRRT Treatment    <Continuous>  dexMEDEtomidine Infusion 1.5 MICROgram(s)/kG/Hr (34.8 mL/Hr) IV Continuous <Continuous>  dextrose 50% Injectable 50 milliLiter(s) IV Push every 15 minutes  HYDROmorphone  Injectable 0.5 milliGRAM(s) IV Push every 6 hours  insulin regular Infusion 6 Unit(s)/Hr (6 mL/Hr) IV Continuous <Continuous>  norepinephrine Infusion 0.05 MICROgram(s)/kG/Min (8.71 mL/Hr) IV Continuous <Continuous>  pantoprazole  Injectable 40 milliGRAM(s) IV Push two times a day  polyethylene glycol 3350 17 Gram(s) Oral two times a day  PrismaSATE Dialysate BGK 4 / 2.5 5000 milliLiter(s) (1800 mL/Hr) CRRT <Continuous>  PrismaSOL Filtration BGK 4 / 2.5 5000 milliLiter(s) (800 mL/Hr) CRRT <Continuous>  PrismaSOL Filtration BGK 4 / 2.5 5000 milliLiter(s) (200 mL/Hr) CRRT <Continuous>  senna 2 Tablet(s) Oral at bedtime  sodium chloride 0.9%. 1000 milliLiter(s) (5 mL/Hr) IV Continuous <Continuous>                                    8.1    13.33 )-----------( 163      ( 08 Dec 2022 00:36 )             25.0       12-08    136  |  104  |  32<H>  ----------------------------<  125<H>  4.7   |  22  |  1.66<H>    Ca    8.0<L>      08 Dec 2022 00:36  Phos  2.1     12-08  Mg     3.0     12-08    TPro  6.1  /  Alb  2.8<L>  /  TBili  0.9  /  DBili  x   /  AST  63<H>  /  ALT  10  /  AlkPhos  249<H>  12-      PT/INR - ( 07 Dec 2022 01:35 )   PT: 15.0 sec;   INR: 1.30 ratio         PTT - ( 08 Dec 2022 00:36 )  PTT:44.7 sec    Mode: AC/ CMV (Assist Control/ Continuous Mandatory Ventilation)  RR (machine): 14  FiO2: 40  PEEP: 12  PS: 40  ITime: 1  MAP: 16  PC: 10  PIP: 23      Daily     Daily Weight in k.6 (08 Dec 2022 00:00)       @ 07:01  -   @ 07:00  --------------------------------------------------------  IN: 2067.2 mL / OUT: 3058 mL / NET: -990.8 mL        Critically Ill patient  : [ ] preoperative ,   [ ] post operative  [x] Non Operative    Requires :  [x ] Arterial Line   [ x] Central Line  [ ] PA catheter  [x] IABP  [x ] ECMO  [ ] LVAD  [x ] Ventilator  [ ] pacemaker [ ] Impella.                      [x ] ABG's     [x ] Pulse Oxymetry Monitoring  Bedside evaluation , monitoring , treatment of hemodynamics , fluids , IVP/ IVCD meds.        Diagnosis:      - [facility] - arrest in SICU     POD 2- IABP placed in cath lab     MI     Acute Pancreatitis     ARDS     Hemodynamic lability,  instability. Requires IVCD [ x] vasopressors [ ] inotropes  [ ] vasodilator  [ x]IVSS fluid  to maintain MAP, perfusion, C.I.     Fluid  overload     Post Arrest Shock state    Hypotension     CHF- acute [ x]   chronic [ x]    systolic [ x]   diatolic [ ]          - Echo- EF -   30-40% Severe segmental LV systolic dysfunction          [ ] RV dysfunction     - Cxr-cardiomegally, edema          - Clinical-  [ ]inotropes   [x]pressors   [x ]diuresis   [x]IABP   [x]ECMO   [ ]LVAD   [x]Respiratory Failure.     Cardiogenic Shock - resolving    Renal Failure - Acute Kidney Injury     CVVHD     IVCD anticoagulation with [ ] Heparin  [x] Argatroban for  ECMO / MI    IABP   [x] management   [ ] wean 1:1 1:2 1:3   [ ] removal and f/u vascular checks     Hypernatremia     IVCD Insulin- DM    Requires bedside physical therapy, mobilization and total [facility] care.     IVCD Precedex  for vent synchrony     Respiratory Failure     Requires chest PT, pulmonary toilet, ambu bagging, suctioning to maintain SaO2,  patent airway and treat atelectasis.     Ventilator Management:  [x ]AC-rest    [ ]CPAP-PS Wean    [ ]Trach Collar     [ ]Extubate    [ ] T-Piece  [ ]peep>5     Difficult weaning process - multiple organ system involvement in critically ill patient     ECMO turn down / wean  echo              I, Finn Zelaya, personally performed the services described in this documentation. All medical record entries made by the scribe were at my direction and in my presence. I have reviewed the chart and agree that the record reflects my personal performance and is accurate and complete.   Finn Zelaya MD.       By signing my name below, I, Abhijit Ngo, attest that this documentation has been prepared under the direction and in the presence of Finn Zelaya MD.   Electronically Signed: Brian Slaughter 22 @ 07:06        Discussed with CT surgeon, Physician Assistant - Nurse Practitioner- Critical care medicine team.   Dicussed at  AM / PM rounds.   Chart, labs , films reviewed.    Cumulative Critical Care Time Given Today:  CRITICAL CARE ATTENDING - CTICU    MEDICATIONS  (STANDING):  argatroban Infusion 0.13 MICROgram(s)/kG/Min (0.73 mL/Hr) IV Continuous <Continuous>  artificial  tears Solution 1 Drop(s) Both EYES four times a day  atorvastatin 40 milliGRAM(s) Oral at bedtime  chlorhexidine 0.12% Liquid 15 milliLiter(s) Oral Mucosa every 12 hours  chlorhexidine 2% Cloths 1 Application(s) Topical <User Schedule>  CRRT Treatment    <Continuous>  dexMEDEtomidine Infusion 1.5 MICROgram(s)/kG/Hr (34.8 mL/Hr) IV Continuous <Continuous>  dextrose 50% Injectable 50 milliLiter(s) IV Push every 15 minutes  HYDROmorphone  Injectable 0.5 milliGRAM(s) IV Push every 6 hours  insulin regular Infusion 6 Unit(s)/Hr (6 mL/Hr) IV Continuous <Continuous>  norepinephrine Infusion 0.05 MICROgram(s)/kG/Min (8.71 mL/Hr) IV Continuous <Continuous>  pantoprazole  Injectable 40 milliGRAM(s) IV Push two times a day  polyethylene glycol 3350 17 Gram(s) Oral two times a day  PrismaSATE Dialysate BGK 4 / 2.5 5000 milliLiter(s) (1800 mL/Hr) CRRT <Continuous>  PrismaSOL Filtration BGK 4 / 2.5 5000 milliLiter(s) (800 mL/Hr) CRRT <Continuous>  PrismaSOL Filtration BGK 4 / 2.5 5000 milliLiter(s) (200 mL/Hr) CRRT <Continuous>  senna 2 Tablet(s) Oral at bedtime  sodium chloride 0.9%. 1000 milliLiter(s) (5 mL/Hr) IV Continuous <Continuous>                                    8.1    13.33 )-----------( 163      ( 08 Dec 2022 00:36 )             25.0       12-08    136  |  104  |  32<H>  ----------------------------<  125<H>  4.7   |  22  |  1.66<H>    Ca    8.0<L>      08 Dec 2022 00:36  Phos  2.1     12-08  Mg     3.0     12-08    TPro  6.1  /  Alb  2.8<L>  /  TBili  0.9  /  DBili  x   /  AST  63<H>  /  ALT  10  /  AlkPhos  249<H>  12-      PT/INR - ( 07 Dec 2022 01:35 )   PT: 15.0 sec;   INR: 1.30 ratio         PTT - ( 08 Dec 2022 00:36 )  PTT:44.7 sec    Mode: AC/ CMV (Assist Control/ Continuous Mandatory Ventilation)  RR (machine): 14  FiO2: 40  PEEP: 12  PS: 40  ITime: 1  MAP: 16  PC: 10  PIP: 23      Daily     Daily Weight in k.6 (08 Dec 2022 00:00)       @ 07:01  -   @ 07:00  --------------------------------------------------------  IN: 2067.2 mL / OUT: 3058 mL / NET: -990.8 mL        Critically Ill patient  : [ ] preoperative ,   [ ] post operative  [x] Non Operative    Requires :  [x ] Arterial Line   [ x] Central Line  [ ] PA catheter  [x] IABP  [x ] ECMO  [ ] LVAD  [x ] Ventilator  [ ] pacemaker [ ] Impella.                      [x ] ABG's     [x ] Pulse Oxymetry Monitoring  Bedside evaluation , monitoring , treatment of hemodynamics , fluids , IVP/ IVCD meds.        Diagnosis:      - [facility]O - arrest in SICU     POD 2- IABP placed in cath lab     MI     Acute Pancreatitis     ARDS     Hemodynamic lability,  instability. Requires IVCD [ x] vasopressors [ ] inotropes  [ ] vasodilator  [ x]IVSS fluid  to maintain MAP, perfusion, C.I.     Fluid  overload     Post Arrest Shock state    Hypotension     CHF- acute [ x]   chronic [ x]    systolic [ x]   diatolic [ ]          - Echo- EF -   30-40% Severe segmental LV systolic dysfunction          [ ] RV dysfunction     - Cxr-cardiomegally, edema          - Clinical-  [ ]inotropes   [x]pressors   [x ]diuresis   [x]IABP   [x]ECMO   [ ]LVAD   [x]Respiratory Failure.     Cardiogenic Shock - resolving    Renal Failure - Acute Kidney Injury     CVVHD     IVCD anticoagulation with [ ] Heparin  [x] Argatroban for  ECMO / MI    IABP   [x] management   [ ] wean 1:1 1:2 1:3   [ ] removal and f/u vascular checks     Hypernatremia     IVCD Insulin- DM    Requires bedside physical therapy, mobilization and total retirement care.     IVCD Precedex  for vent synchrony     Respiratory Failure     Requires chest PT, pulmonary toilet, ambu bagging, suctioning to maintain SaO2,  patent airway and treat atelectasis.     Ventilator Management:  [x ]AC-rest    [ ]CPAP-PS Wean    [ ]Trach Collar     [ ]Extubate    [ ] T-Piece  [ ]peep>5     Difficult weaning process - multiple organ system involvement in critically ill patient     ECMO turn down / wean  echo              I, Finn Zealya, personally performed the services described in this documentation. All medical record entries made by the scribe were at my direction and in my presence. I have reviewed the chart and agree that the record reflects my personal performance and is accurate and complete.   Finn Zelaya MD.       By signing my name below, I, Abhijit Ngo, attest that this documentation has been prepared under the direction and in the presence of Finn Zelaya MD.   Electronically Signed: Brian Slaughter 22 @ 07:06        Discussed with CT surgeon, Physician Assistant - Nurse Practitioner- Critical care medicine team.   Dicussed at  AM / PM rounds.   Chart, labs , films reviewed.    Cumulative Critical Care Time Given Today:  CRITICAL CARE ATTENDING - CTICU    MEDICATIONS  (STANDING):  argatroban Infusion 0.13 MICROgram(s)/kG/Min (0.73 mL/Hr) IV Continuous <Continuous>  artificial  tears Solution 1 Drop(s) Both EYES four times a day  atorvastatin 40 milliGRAM(s) Oral at bedtime  chlorhexidine 0.12% Liquid 15 milliLiter(s) Oral Mucosa every 12 hours  chlorhexidine 2% Cloths 1 Application(s) Topical <User Schedule>  CRRT Treatment    <Continuous>  dexMEDEtomidine Infusion 1.5 MICROgram(s)/kG/Hr (34.8 mL/Hr) IV Continuous <Continuous>  dextrose 50% Injectable 50 milliLiter(s) IV Push every 15 minutes  HYDROmorphone  Injectable 0.5 milliGRAM(s) IV Push every 6 hours  insulin regular Infusion 6 Unit(s)/Hr (6 mL/Hr) IV Continuous <Continuous>  norepinephrine Infusion 0.05 MICROgram(s)/kG/Min (8.71 mL/Hr) IV Continuous <Continuous>  pantoprazole  Injectable 40 milliGRAM(s) IV Push two times a day  polyethylene glycol 3350 17 Gram(s) Oral two times a day  PrismaSATE Dialysate BGK 4 / 2.5 5000 milliLiter(s) (1800 mL/Hr) CRRT <Continuous>  PrismaSOL Filtration BGK 4 / 2.5 5000 milliLiter(s) (800 mL/Hr) CRRT <Continuous>  PrismaSOL Filtration BGK 4 / 2.5 5000 milliLiter(s) (200 mL/Hr) CRRT <Continuous>  senna 2 Tablet(s) Oral at bedtime  sodium chloride 0.9%. 1000 milliLiter(s) (5 mL/Hr) IV Continuous <Continuous>                                    8.1    13.33 )-----------( 163      ( 08 Dec 2022 00:36 )             25.0       12-08    136  |  104  |  32<H>  ----------------------------<  125<H>  4.7   |  22  |  1.66<H>    Ca    8.0<L>      08 Dec 2022 00:36  Phos  2.1     12-08  Mg     3.0     12-08    TPro  6.1  /  Alb  2.8<L>  /  TBili  0.9  /  DBili  x   /  AST  63<H>  /  ALT  10  /  AlkPhos  249<H>  12      PT/INR - ( 07 Dec 2022 01:35 )   PT: 15.0 sec;   INR: 1.30 ratio         PTT - ( 08 Dec 2022 00:36 )  PTT:44.7 sec    Mode: AC/ CMV (Assist Control/ Continuous Mandatory Ventilation)  RR (machine): 14  FiO2: 40  PEEP: 12  PS: 40  ITime: 1  MAP: 16  PC: 10  PIP: 23      Daily     Daily Weight in k.6 (08 Dec 2022 00:00)       @ 07:01  -   @ 07:00  --------------------------------------------------------  IN: 2067.2 mL / OUT: 3058 mL / NET: -990.8 mL        Critically Ill patient  : [ ] preoperative ,   [ ] post operative  [x] Non Operative    Requires :  [x ] Arterial Line   [ x] Central Line  [ ] PA catheter  [x] IABP  [x ] ECMO  [ ] LVAD  [x ] Ventilator  [ ] pacemaker [ ] Impella.                      [x ] ABG's     [x ] Pulse Oxymetry Monitoring  Bedside evaluation , monitoring , treatment of hemodynamics , fluids , IVP/ IVCD meds.        Diagnosis:      - [facility] ECMO - arrest in SICU     ECMO Decannulation this AM     POD 2- IABP placed in cath lab     MI     Acute Pancreatitis     ARDS     Hemodynamic lability,  instability. Requires IVCD [ x] vasopressors [ ] inotropes  [ ] vasodilator  [ x]IVSS fluid  to maintain MAP, perfusion, C.I.     Fluid  overload     Post Arrest Shock state    Hypotension     CHF- acute [ x]   chronic [ x]    systolic [ x]   diatolic [ ]          - Echo- EF -   30-40% Severe segmental LV systolic dysfunction          [ ] RV dysfunction     - Cxr-cardiomegally, edema          - Clinical-  [ x]inotropes   [x]pressors   [x ]diuresis   [x]IABP   [x]ECMO   [ ]LVAD   [x]Respiratory Failure.     Cardiogenic Shock - resolving    Renal Failure - Acute Kidney Injury     CVVHD     IVCD anticoagulation with [ ] Heparin  [x] Argatroban for  ECMO / MI    IABP   [x] management   [ ] wean 1:1 1:2 1:3   [ ] removal and f/u vascular checks     IVCD Insulin- DM    Requires bedside physical therapy, mobilization and total [facility] care.     IVCD Precedex  for vent synchrony     Respiratory Failure     Requires chest PT, pulmonary toilet, ambu bagging, suctioning to maintain SaO2,  patent airway and treat atelectasis.     Ventilator Management:  [x ]AC-rest    [ ]CPAP-PS Wean    [ ]Trach Collar     [ ]Extubate    [ ] T-Piece  [ ]peep>5     Difficult weaning process - multiple organ system involvement in critically ill patient     Cardioverted from A Flutter at 140/min to /min              I, Finn Zealya, personally performed the services described in this documentation. All medical record entries made by the scribe were at my direction and in my presence. I have reviewed the chart and agree that the record reflects my personal performance and is accurate and complete.   Finn Zelyaa MD.       By signing my name below, I, Abhijit Ngo, attest that this documentation has been prepared under the direction and in the presence of Finn Zelaya MD.   Electronically Signed: Brian Slaughter 22 @ 07:06        Discussed with CT surgeon, Physician Assistant - Nurse Practitioner- Critical care medicine team.   Dicussed at  AM / PM rounds.   Chart, labs , films reviewed.    Cumulative Critical Care Time Given Today:  90 min CRITICAL CARE ATTENDING - CTICU    MEDICATIONS  (STANDING):  argatroban Infusion 0.13 MICROgram(s)/kG/Min (0.73 mL/Hr) IV Continuous <Continuous>  artificial  tears Solution 1 Drop(s) Both EYES four times a day  atorvastatin 40 milliGRAM(s) Oral at bedtime  chlorhexidine 0.12% Liquid 15 milliLiter(s) Oral Mucosa every 12 hours  chlorhexidine 2% Cloths 1 Application(s) Topical <User Schedule>  CRRT Treatment    <Continuous>  dexMEDEtomidine Infusion 1.5 MICROgram(s)/kG/Hr (34.8 mL/Hr) IV Continuous <Continuous>  dextrose 50% Injectable 50 milliLiter(s) IV Push every 15 minutes  HYDROmorphone  Injectable 0.5 milliGRAM(s) IV Push every 6 hours  insulin regular Infusion 6 Unit(s)/Hr (6 mL/Hr) IV Continuous <Continuous>  norepinephrine Infusion 0.05 MICROgram(s)/kG/Min (8.71 mL/Hr) IV Continuous <Continuous>  pantoprazole  Injectable 40 milliGRAM(s) IV Push two times a day  polyethylene glycol 3350 17 Gram(s) Oral two times a day  PrismaSATE Dialysate BGK 4 / 2.5 5000 milliLiter(s) (1800 mL/Hr) CRRT <Continuous>  PrismaSOL Filtration BGK 4 / 2.5 5000 milliLiter(s) (800 mL/Hr) CRRT <Continuous>  PrismaSOL Filtration BGK 4 / 2.5 5000 milliLiter(s) (200 mL/Hr) CRRT <Continuous>  senna 2 Tablet(s) Oral at bedtime  sodium chloride 0.9%. 1000 milliLiter(s) (5 mL/Hr) IV Continuous <Continuous>                                    8.1    13.33 )-----------( 163      ( 08 Dec 2022 00:36 )             25.0       12-08    136  |  104  |  32<H>  ----------------------------<  125<H>  4.7   |  22  |  1.66<H>    Ca    8.0<L>      08 Dec 2022 00:36  Phos  2.1     12-08  Mg     3.0     12-08    TPro  6.1  /  Alb  2.8<L>  /  TBili  0.9  /  DBili  x   /  AST  63<H>  /  ALT  10  /  AlkPhos  249<H>  12      PT/INR - ( 07 Dec 2022 01:35 )   PT: 15.0 sec;   INR: 1.30 ratio         PTT - ( 08 Dec 2022 00:36 )  PTT:44.7 sec    Mode: AC/ CMV (Assist Control/ Continuous Mandatory Ventilation)  RR (machine): 14  FiO2: 40  PEEP: 12  PS: 40  ITime: 1  MAP: 16  PC: 10  PIP: 23      Daily     Daily Weight in k.6 (08 Dec 2022 00:00)       @ 07:01  -   @ 07:00  --------------------------------------------------------  IN: 2067.2 mL / OUT: 3058 mL / NET: -990.8 mL        Critically Ill patient  : [ ] preoperative ,   [ ] post operative  [x] Non Operative    Requires :  [x ] Arterial Line   [ x] Central Line  [ ] PA catheter  [x] IABP  [x ] ECMO  [ ] LVAD  [x ] Ventilator  [ ] pacemaker [ ] Impella.                      [x ] ABG's     [x ] Pulse Oxymetry Monitoring  Bedside evaluation , monitoring , treatment of hemodynamics , fluids , IVP/ IVCD meds.        Diagnosis:      - [facility] ECMO - arrest in SICU     ECMO Decannulation this AM     POD 2- IABP placed in cath lab     MI     Acute Pancreatitis     ARDS     Hemodynamic lability,  instability. Requires IVCD [ x] vasopressors [ ] inotropes  [ ] vasodilator  [ x]IVSS fluid  to maintain MAP, perfusion, C.I.     Fluid  overload     Post Arrest Shock state    Hypotension     CHF- acute [ x]   chronic [ x]    systolic [ x]   diatolic [ ]          - Echo- EF -   30-40% Severe segmental LV systolic dysfunction          [ ] RV dysfunction     - Cxr-cardiomegally, edema          - Clinical-  [ x]inotropes   [x]pressors   [x ]diuresis   [x]IABP   [x]ECMO   [ ]LVAD   [x]Respiratory Failure.     Cardiogenic Shock - resolving    Renal Failure - Acute Kidney Injury     CVVHD     IVCD anticoagulation with [ ] Heparin  [x] Argatroban for  ECMO / MI    IABP   [x] management   [ ] wean 1:1 1:2 1:3   [ ] removal and f/u vascular checks     IVCD Insulin- DM    Requires bedside physical therapy, mobilization and total [facility] care.     IVCD Precedex  for vent synchrony     Respiratory Failure     Requires chest PT, pulmonary toilet, ambu bagging, suctioning to maintain SaO2,  patent airway and treat atelectasis.     Ventilator Management:  [x ]AC-rest    [ ]CPAP-PS Wean    [ ]Trach Collar     [ ]Extubate    [ ] T-Piece  [ ]peep>5     Difficult weaning process - multiple organ system involvement in critically ill patient     Cardioverted from A Flutter at 140/min to /min              I, Finn Zelaya, personally performed the services described in this documentation. All medical record entries made by the scribe were at my direction and in my presence. I have reviewed the chart and agree that the record reflects my personal performance and is accurate and complete.   Finn Zelaya MD.       By signing my name below, I, Abhijit Ngo, attest that this documentation has been prepared under the direction and in the presence of Finn Zelaya MD.   Electronically Signed: Brian Slaughter 22 @ 07:06        Discussed with CT surgeon, Physician Assistant - Nurse Practitioner- Critical care medicine team.   Dicussed at  AM / PM rounds.   Chart, labs , films reviewed.    Cumulative Critical Care Time Given Today:  90 min CRITICAL CARE ATTENDING - CTICU    MEDICATIONS  (STANDING):  argatroban Infusion 0.13 MICROgram(s)/kG/Min (0.73 mL/Hr) IV Continuous <Continuous>  artificial  tears Solution 1 Drop(s) Both EYES four times a day  atorvastatin 40 milliGRAM(s) Oral at bedtime  chlorhexidine 0.12% Liquid 15 milliLiter(s) Oral Mucosa every 12 hours  chlorhexidine 2% Cloths 1 Application(s) Topical <User Schedule>  CRRT Treatment    <Continuous>  dexMEDEtomidine Infusion 1.5 MICROgram(s)/kG/Hr (34.8 mL/Hr) IV Continuous <Continuous>  dextrose 50% Injectable 50 milliLiter(s) IV Push every 15 minutes  HYDROmorphone  Injectable 0.5 milliGRAM(s) IV Push every 6 hours  insulin regular Infusion 6 Unit(s)/Hr (6 mL/Hr) IV Continuous <Continuous>  norepinephrine Infusion 0.05 MICROgram(s)/kG/Min (8.71 mL/Hr) IV Continuous <Continuous>  pantoprazole  Injectable 40 milliGRAM(s) IV Push two times a day  polyethylene glycol 3350 17 Gram(s) Oral two times a day  PrismaSATE Dialysate BGK 4 / 2.5 5000 milliLiter(s) (1800 mL/Hr) CRRT <Continuous>  PrismaSOL Filtration BGK 4 / 2.5 5000 milliLiter(s) (800 mL/Hr) CRRT <Continuous>  PrismaSOL Filtration BGK 4 / 2.5 5000 milliLiter(s) (200 mL/Hr) CRRT <Continuous>  senna 2 Tablet(s) Oral at bedtime  sodium chloride 0.9%. 1000 milliLiter(s) (5 mL/Hr) IV Continuous <Continuous>                                    8.1    13.33 )-----------( 163      ( 08 Dec 2022 00:36 )             25.0       12-08    136  |  104  |  32<H>  ----------------------------<  125<H>  4.7   |  22  |  1.66<H>    Ca    8.0<L>      08 Dec 2022 00:36  Phos  2.1     12-08  Mg     3.0     12-08    TPro  6.1  /  Alb  2.8<L>  /  TBili  0.9  /  DBili  x   /  AST  63<H>  /  ALT  10  /  AlkPhos  249<H>  12      PT/INR - ( 07 Dec 2022 01:35 )   PT: 15.0 sec;   INR: 1.30 ratio         PTT - ( 08 Dec 2022 00:36 )  PTT:44.7 sec    Mode: AC/ CMV (Assist Control/ Continuous Mandatory Ventilation)  RR (machine): 14  FiO2: 40  PEEP: 12  PS: 40  ITime: 1  MAP: 16  PC: 10  PIP: 23      Daily     Daily Weight in k.6 (08 Dec 2022 00:00)       @ 07:01  -   @ 07:00  --------------------------------------------------------  IN: 2067.2 mL / OUT: 3058 mL / NET: -990.8 mL        Critically Ill patient  : [ ] preoperative ,   [ ] post operative  [x] Non Operative    Requires :  [x ] Arterial Line   [ x] Central Line  [ ] PA catheter  [x] IABP  [x ] ECMO  [ ] LVAD  [x ] Ventilator  [ ] pacemaker [ ] Impella.                      [x ] ABG's     [x ] Pulse Oxymetry Monitoring  Bedside evaluation , monitoring , treatment of hemodynamics , fluids , IVP/ IVCD meds.        Diagnosis:      - [facility] ECMO - arrest in SICU     ECMO Decannulation this AM     POD 2- IABP placed in cath lab     MI     Acute Pancreatitis     ARDS     Hemodynamic lability,  instability. Requires IVCD [ x] vasopressors [ ] inotropes  [ ] vasodilator  [ x]IVSS fluid  to maintain MAP, perfusion, C.I.     Fluid  overload     Post Arrest Shock state    Hypotension     CHF- acute [ x]   chronic [ x]    systolic [ x]   diatolic [ ]          - Echo- EF -   30-40% Severe segmental LV systolic dysfunction          [ ] RV dysfunction     - Cxr-cardiomegally, edema          - Clinical-  [ x]inotropes   [x]pressors   [x ]diuresis   [x]IABP   [x]ECMO   [ ]LVAD   [x]Respiratory Failure.     Cardiogenic Shock - resolving    Renal Failure - Acute Kidney Injury     CVVHD     IVCD anticoagulation with [ ] Heparin  [x] Argatroban for  ECMO / MI    IABP   [x] management   [ ] wean 1:1 1:2 1:3   [ ] removal and f/u vascular checks     IVCD Insulin- DM    Requires bedside physical therapy, mobilization and total FDC care.     IVCD Precedex  for vent synchrony     Respiratory Failure     Requires chest PT, pulmonary toilet, ambu bagging, suctioning to maintain SaO2,  patent airway and treat atelectasis.     Ventilator Management:  [x ]AC-rest    [ ]CPAP-PS Wean    [ ]Trach Collar     [ ]Extubate    [ ] T-Piece  [ ]peep>5     Difficult weaning process - multiple organ system involvement in critically ill patient     Cardioverted from A Flutter at 140/min to /min              I, Finn Zelaya, personally performed the services described in this documentation. All medical record entries made by the scribe were at my direction and in my presence. I have reviewed the chart and agree that the record reflects my personal performance and is accurate and complete.   Finn Zelaya MD.       By signing my name below, I, Abhijit Ngo, attest that this documentation has been prepared under the direction and in the presence of Finn Zelaya MD.   Electronically Signed: Brian Slaughter 22 @ 07:06        Discussed with CT surgeon, Physician Assistant - Nurse Practitioner- Critical care medicine team.   Dicussed at  AM / PM rounds.   Chart, labs , films reviewed.    Cumulative Critical Care Time Given Today:  90 min

## 2022-12-08 NOTE — PROGRESS NOTE ADULT - ATTENDING COMMENTS
Underwent decannulation today but noted to have mod-severe MR. Had aflutter afterwards s/p conversion to sinus. On exam, NAD, JVP approx 10-12 cm w/ HJR, RRR, CTA anterior lung fields, abdomen soft with bowel sounds, 1+ edema, pulses intact. Labs reviewed - WBC 17.8 (from 13), Na 136 (improved), BUN/Cr 34/1.89 (on HD), albumin 3.0, amylase/lipase stably elevated (improved from prior). LHC 12/6 revealed patent [id number] to LAD,  of RCA/LCx with L to L and L to R collaterals; aortogram revealed no filling of any other grafts.   - would consider resuming CVVH; goal net negative  - c/w IABP and will attempt wean with inotropic support if necessary  - c/w anticoagulation  - on statin  - prognosis guarded; family meeting held 12/6 and discussed at length current state and next steps with plan to f/u next week

## 2022-12-08 NOTE — PROGRESS NOTE ADULT - REASON FOR ADMISSION
Acute cholecystitis, gallstone pancreatitis No Repair - Repaired With Adjacent Surgical Defect Text (Leave Blank If You Do Not Want): After obtaining clear surgical margins the defect was repaired concurrently with another surgical defect which was in close approximation.

## 2022-12-08 NOTE — CONSULT NOTE ADULT - SUBJECTIVE AND OBJECTIVE BOX
CC: Soft palate laceration    HPI: 61 y/o male with PMH of CABG, DM, HTN, HLD presenting as transfer from [place] for STEMI. Course c/b gallstone pancreatitis leading to ARDS, shock, cardiac arrest then placed on [facility]. Pt went for ECMO decannulation today. Team states that as they were placing GRZEGORZ probe they possibly caused a soft palate laceration, as they see mild oozing of blood in the posterior oropharynx. ENT consulted for evaluation of the laceration. Team mentions anticoagulation has been held, patient is intubated and awake/alert, follows commands.   **Include at least 4 modifiers (Onset? Duration? Quality? Radiation? Severity? Laterality? What makes it better or worse?)**      PAST MEDICAL & SURGICAL HISTORY:    Allergies    No Known Allergies    Intolerances      MEDICATIONS  (STANDING):  albumin human 25% IVPB 50 milliLiter(s) IV Intermittent every 10 minutes  aMIOdarone Infusion 1 mG/Min (33.3 mL/Hr) IV Continuous <Continuous>  atorvastatin 40 milliGRAM(s) Oral at bedtime  chlorhexidine 0.12% Liquid 15 milliLiter(s) Oral Mucosa every 12 hours  dextrose 50% Injectable 50 milliLiter(s) IV Push every 15 minutes  dextrose 50% Injectable 25 milliLiter(s) IV Push every 15 minutes  digoxin  Injectable 250 MICROGram(s) IV Push once  insulin regular Infusion 3 Unit(s)/Hr (3 mL/Hr) IV Continuous <Continuous>  pantoprazole  Injectable 40 milliGRAM(s) IV Push two times a day  polyethylene glycol 3350 17 Gram(s) Oral two times a day  senna 2 Tablet(s) Oral at bedtime  vasopressin Infusion 0.05 Unit(s)/Min (7.5 mL/Hr) IV Continuous <Continuous>    MEDICATIONS  (PRN):      Social History: no tobacco, etoh, or illegal substance use    Family history: no significant family history    ROS:   ENT: all negative except as noted in HPI   CV: denies palpitations  Pulm: denies SOB, cough, hemoptysis  GI: denies change in apetite, indigestion, n/v  : denies pertinent urinary symptoms, urgency  Neuro: denies numbness/tingling, loss of sensation  Psych: denies anxiety  MS: denies muscle weakness, instability  Heme: denies easy bruising or bleeding  Endo: denies heat/cold intolerance, excessive sweating  Vascular: denies LE edema    Vital Signs Last 24 Hrs  T(C): 38 (08 Dec 2022 15:00), Max: 38 (08 Dec 2022 13:00)  T(F): 100.4 (08 Dec 2022 15:00), Max: 100.4 (08 Dec 2022 13:00)  HR: 98 (08 Dec 2022 15:00) (74 - 134)  BP: 108/54 (08 Dec 2022 07:40) (108/54 - 108/54)  BP(mean): 76 (08 Dec 2022 07:40) (76 - 76)  RR: 19 (08 Dec 2022 15:00) (7 - 31)  SpO2: 100% (08 Dec 2022 15:00) (95% - 100%)    Parameters below as of 08 Dec 2022 07:40    O2 Flow (L/min): 40  O2 Concentration (%): 40                          10.0   17.82 )-----------( 157      ( 08 Dec 2022 10:58 )             31.1    12-08    136  |  101  |  34<H>  ----------------------------<  184<H>  4.9   |  21<L>  |  1.89<H>    Ca    8.8      08 Dec 2022 10:58  Phos  4.2     12-08  Mg     2.9     12-08    TPro  6.2  /  Alb  2.9<L>  /  TBili  1.4<H>  /  DBili  x   /  AST  60<H>  /  ALT  11  /  AlkPhos  237<H>  12-08   PT/INR - ( 08 Dec 2022 10:58 )   PT: 13.5 sec;   INR: 1.16 ratio         PTT - ( 08 Dec 2022 10:58 )  PTT:46.3 sec    PHYSICAL EXAM:  Gen: NAD  Skin: No rashes, bruises, or lesions  Head: Normocephalic, Atraumatic  Face: no edema, erythema, or fluctuance. Parotid glands soft without mass  Eyes: no scleral injection  Nose: Nares bilaterally patent, no discharge  Mouth: No lacerations noted on soft or hard palate or oral mucosa. No Stridor / Drooling / Trismus.  Mucosa moist, tongue/uvula midline, blood mixed with secretions noted in the oropharynx   Neck: Flat, supple, no lymphadenopathy, trachea midline, no masses  Lymphatic: No lymphadenopathy  Resp: breathing easily, no stridor  CV: no peripheral edema/cyanosis  GI: nondistended   Peripheral vascular: no JVD or edema  Neuro: facial nerve intact, no facial droop        Diagnostic Nasal Endoscopy: (Scope #2 used)    Fiberoptic Indirect laryngoscopy:  (Scope #2 used)        IMAGING/ADDITIONAL STUDIES:  CC: Soft palate laceration    HPI: 63 y/o male with PMH of CABG, DM, HTN, HLD presenting as transfer from [place] for STEMI. Course c/b gallstone pancreatitis leading to ARDS, shock, cardiac arrest then placed on [facility] ECMO. Pt went for ECMO decannulation today. Team states that as they were placing GRZEGORZ probe they possibly caused a soft palate laceration, as they see mild oozing of blood in the posterior oropharynx. ENT consulted for evaluation of the laceration. Team mentions anticoagulation has been held, patient is intubated and awake/alert, follows commands.   **Include at least 4 modifiers (Onset? Duration? Quality? Radiation? Severity? Laterality? What makes it better or worse?)**      PAST MEDICAL & SURGICAL HISTORY:    Allergies    No Known Allergies    Intolerances      MEDICATIONS  (STANDING):  albumin human 25% IVPB 50 milliLiter(s) IV Intermittent every 10 minutes  aMIOdarone Infusion 1 mG/Min (33.3 mL/Hr) IV Continuous <Continuous>  atorvastatin 40 milliGRAM(s) Oral at bedtime  chlorhexidine 0.12% Liquid 15 milliLiter(s) Oral Mucosa every 12 hours  dextrose 50% Injectable 50 milliLiter(s) IV Push every 15 minutes  dextrose 50% Injectable 25 milliLiter(s) IV Push every 15 minutes  digoxin  Injectable 250 MICROGram(s) IV Push once  insulin regular Infusion 3 Unit(s)/Hr (3 mL/Hr) IV Continuous <Continuous>  pantoprazole  Injectable 40 milliGRAM(s) IV Push two times a day  polyethylene glycol 3350 17 Gram(s) Oral two times a day  senna 2 Tablet(s) Oral at bedtime  vasopressin Infusion 0.05 Unit(s)/Min (7.5 mL/Hr) IV Continuous <Continuous>    MEDICATIONS  (PRN):      Social History: no tobacco, etoh, or illegal substance use    Family history: no significant family history    ROS:   ENT: all negative except as noted in HPI   CV: denies palpitations  Pulm: denies SOB, cough, hemoptysis  GI: denies change in apetite, indigestion, n/v  : denies pertinent urinary symptoms, urgency  Neuro: denies numbness/tingling, loss of sensation  Psych: denies anxiety  MS: denies muscle weakness, instability  Heme: denies easy bruising or bleeding  Endo: denies heat/cold intolerance, excessive sweating  Vascular: denies LE edema    Vital Signs Last 24 Hrs  T(C): 38 (08 Dec 2022 15:00), Max: 38 (08 Dec 2022 13:00)  T(F): 100.4 (08 Dec 2022 15:00), Max: 100.4 (08 Dec 2022 13:00)  HR: 98 (08 Dec 2022 15:00) (74 - 134)  BP: 108/54 (08 Dec 2022 07:40) (108/54 - 108/54)  BP(mean): 76 (08 Dec 2022 07:40) (76 - 76)  RR: 19 (08 Dec 2022 15:00) (7 - 31)  SpO2: 100% (08 Dec 2022 15:00) (95% - 100%)    Parameters below as of 08 Dec 2022 07:40    O2 Flow (L/min): 40  O2 Concentration (%): 40                          10.0   17.82 )-----------( 157      ( 08 Dec 2022 10:58 )             31.1    12-08    136  |  101  |  34<H>  ----------------------------<  184<H>  4.9   |  21<L>  |  1.89<H>    Ca    8.8      08 Dec 2022 10:58  Phos  4.2     12-08  Mg     2.9     12-08    TPro  6.2  /  Alb  2.9<L>  /  TBili  1.4<H>  /  DBili  x   /  AST  60<H>  /  ALT  11  /  AlkPhos  237<H>  12-08   PT/INR - ( 08 Dec 2022 10:58 )   PT: 13.5 sec;   INR: 1.16 ratio         PTT - ( 08 Dec 2022 10:58 )  PTT:46.3 sec    PHYSICAL EXAM:  Gen: NAD  Skin: No rashes, bruises, or lesions  Head: Normocephalic, Atraumatic  Face: no edema, erythema, or fluctuance. Parotid glands soft without mass  Eyes: no scleral injection  Nose: Nares bilaterally patent, no discharge  Mouth: No lacerations noted on soft or hard palate or oral mucosa. No Stridor / Drooling / Trismus.  Mucosa moist, tongue/uvula midline, blood mixed with secretions noted in the oropharynx   Neck: Flat, supple, no lymphadenopathy, trachea midline, no masses  Lymphatic: No lymphadenopathy  Resp: breathing easily, no stridor  CV: no peripheral edema/cyanosis  GI: nondistended   Peripheral vascular: no JVD or edema  Neuro: facial nerve intact, no facial droop        Diagnostic Nasal Endoscopy: (Scope #2 used)    Fiberoptic Indirect laryngoscopy:  (Scope #2 used)        IMAGING/ADDITIONAL STUDIES:  CC: Soft palate laceration    HPI: 61 y/o male with PMH of CABG, DM, HTN, HLD presenting as transfer from [place] for STEMI. Course c/b gallstone pancreatitis leading to ARDS, shock, cardiac arrest then placed on [facility] ECMO. Pt went for ECMO decannulation today. Team states that as they were placing GRZEGORZ probe they possibly caused a soft palate laceration, as they see mild oozing of blood in the posterior oropharynx. ENT consulted for evaluation of the laceration. Team mentions anticoagulation has been held, patient is intubated and awake/alert, follows commands.   **Include at least 4 modifiers (Onset? Duration? Quality? Radiation? Severity? Laterality? What makes it better or worse?)**      PAST MEDICAL & SURGICAL HISTORY:    Allergies    No Known Allergies    Intolerances      MEDICATIONS  (STANDING):  albumin human 25% IVPB 50 milliLiter(s) IV Intermittent every 10 minutes  aMIOdarone Infusion 1 mG/Min (33.3 mL/Hr) IV Continuous <Continuous>  atorvastatin 40 milliGRAM(s) Oral at bedtime  chlorhexidine 0.12% Liquid 15 milliLiter(s) Oral Mucosa every 12 hours  dextrose 50% Injectable 50 milliLiter(s) IV Push every 15 minutes  dextrose 50% Injectable 25 milliLiter(s) IV Push every 15 minutes  digoxin  Injectable 250 MICROGram(s) IV Push once  insulin regular Infusion 3 Unit(s)/Hr (3 mL/Hr) IV Continuous <Continuous>  pantoprazole  Injectable 40 milliGRAM(s) IV Push two times a day  polyethylene glycol 3350 17 Gram(s) Oral two times a day  senna 2 Tablet(s) Oral at bedtime  vasopressin Infusion 0.05 Unit(s)/Min (7.5 mL/Hr) IV Continuous <Continuous>    MEDICATIONS  (PRN):      Social History: no tobacco, etoh, or illegal substance use    Family history: no significant family history    ROS:   ENT: all negative except as noted in HPI   CV: denies palpitations  Pulm: denies SOB, cough, hemoptysis  GI: denies change in apetite, indigestion, n/v  : denies pertinent urinary symptoms, urgency  Neuro: denies numbness/tingling, loss of sensation  Psych: denies anxiety  MS: denies muscle weakness, instability  Heme: denies easy bruising or bleeding  Endo: denies heat/cold intolerance, excessive sweating  Vascular: denies LE edema    Vital Signs Last 24 Hrs  T(C): 38 (08 Dec 2022 15:00), Max: 38 (08 Dec 2022 13:00)  T(F): 100.4 (08 Dec 2022 15:00), Max: 100.4 (08 Dec 2022 13:00)  HR: 98 (08 Dec 2022 15:00) (74 - 134)  BP: 108/54 (08 Dec 2022 07:40) (108/54 - 108/54)  BP(mean): 76 (08 Dec 2022 07:40) (76 - 76)  RR: 19 (08 Dec 2022 15:00) (7 - 31)  SpO2: 100% (08 Dec 2022 15:00) (95% - 100%)    Parameters below as of 08 Dec 2022 07:40    O2 Flow (L/min): 40  O2 Concentration (%): 40                          10.0   17.82 )-----------( 157      ( 08 Dec 2022 10:58 )             31.1    12-08    136  |  101  |  34<H>  ----------------------------<  184<H>  4.9   |  21<L>  |  1.89<H>    Ca    8.8      08 Dec 2022 10:58  Phos  4.2     12-08  Mg     2.9     12-08    TPro  6.2  /  Alb  2.9<L>  /  TBili  1.4<H>  /  DBili  x   /  AST  60<H>  /  ALT  11  /  AlkPhos  237<H>  12-08   PT/INR - ( 08 Dec 2022 10:58 )   PT: 13.5 sec;   INR: 1.16 ratio         PTT - ( 08 Dec 2022 10:58 )  PTT:46.3 sec    PHYSICAL EXAM:  Gen: NAD  Skin: No rashes, bruises, or lesions  Head: Normocephalic, Atraumatic  Face: no edema, erythema, or fluctuance. Parotid glands soft without mass  Eyes: no scleral injection  Nose: Nares bilaterally patent, no discharge  Mouth: No lacerations noted on soft or hard palate or oral mucosa. No Stridor / Drooling / Trismus.  Mucosa moist, tongue/uvula midline, blood mixed with secretions noted in the oropharynx   Neck: Flat, supple, no lymphadenopathy, trachea midline, no masses  Lymphatic: No lymphadenopathy  Resp: breathing easily, no stridor  CV: no peripheral edema/cyanosis  GI: nondistended   Peripheral vascular: no JVD or edema  Neuro: facial nerve intact, no facial droop        Diagnostic Nasal Endoscopy: (Scope #2 used)    Fiberoptic Indirect laryngoscopy:  (Scope #2 used)        IMAGING/ADDITIONAL STUDIES:  CC: Soft palate laceration    HPI: 63 y/o male with PMH of CABG, DM, HTN, HLD presenting as transfer from [place] for STEMI. Course c/b gallstone pancreatitis leading to ARDS, shock, cardiac arrest then placed on [facility] ECMO. Pt went for ECMO decannulation today. Team states that as they were placing GRZEGORZ probe they possibly caused a soft palate laceration, as they now see mild oozing of blood in the posterior oropharynx. ENT consulted for evaluation of the laceration. Team mentions anticoagulation has been held, patient is intubated and awake/alert, follows commands.   **Include at least 4 modifiers (Onset? Duration? Quality? Radiation? Severity? Laterality? What makes it better or worse?)**      PAST MEDICAL & SURGICAL HISTORY:    Allergies    No Known Allergies    Intolerances      MEDICATIONS  (STANDING):  albumin human 25% IVPB 50 milliLiter(s) IV Intermittent every 10 minutes  aMIOdarone Infusion 1 mG/Min (33.3 mL/Hr) IV Continuous <Continuous>  atorvastatin 40 milliGRAM(s) Oral at bedtime  chlorhexidine 0.12% Liquid 15 milliLiter(s) Oral Mucosa every 12 hours  dextrose 50% Injectable 50 milliLiter(s) IV Push every 15 minutes  dextrose 50% Injectable 25 milliLiter(s) IV Push every 15 minutes  digoxin  Injectable 250 MICROGram(s) IV Push once  insulin regular Infusion 3 Unit(s)/Hr (3 mL/Hr) IV Continuous <Continuous>  pantoprazole  Injectable 40 milliGRAM(s) IV Push two times a day  polyethylene glycol 3350 17 Gram(s) Oral two times a day  senna 2 Tablet(s) Oral at bedtime  vasopressin Infusion 0.05 Unit(s)/Min (7.5 mL/Hr) IV Continuous <Continuous>    MEDICATIONS  (PRN):      Social History: no tobacco, etoh, or illegal substance use    Family history: no significant family history    ROS:   ENT: all negative except as noted in HPI   CV: denies palpitations  Pulm: denies SOB, cough, hemoptysis  GI: denies change in apetite, indigestion, n/v  : denies pertinent urinary symptoms, urgency  Neuro: denies numbness/tingling, loss of sensation  Psych: denies anxiety  MS: denies muscle weakness, instability  Heme: denies easy bruising or bleeding  Endo: denies heat/cold intolerance, excessive sweating  Vascular: denies LE edema    Vital Signs Last 24 Hrs  T(C): 38 (08 Dec 2022 15:00), Max: 38 (08 Dec 2022 13:00)  T(F): 100.4 (08 Dec 2022 15:00), Max: 100.4 (08 Dec 2022 13:00)  HR: 98 (08 Dec 2022 15:00) (74 - 134)  BP: 108/54 (08 Dec 2022 07:40) (108/54 - 108/54)  BP(mean): 76 (08 Dec 2022 07:40) (76 - 76)  RR: 19 (08 Dec 2022 15:00) (7 - 31)  SpO2: 100% (08 Dec 2022 15:00) (95% - 100%)    Parameters below as of 08 Dec 2022 07:40    O2 Flow (L/min): 40  O2 Concentration (%): 40                          10.0   17.82 )-----------( 157      ( 08 Dec 2022 10:58 )             31.1    12-08    136  |  101  |  34<H>  ----------------------------<  184<H>  4.9   |  21<L>  |  1.89<H>    Ca    8.8      08 Dec 2022 10:58  Phos  4.2     12-08  Mg     2.9     12-08    TPro  6.2  /  Alb  2.9<L>  /  TBili  1.4<H>  /  DBili  x   /  AST  60<H>  /  ALT  11  /  AlkPhos  237<H>  12-08   PT/INR - ( 08 Dec 2022 10:58 )   PT: 13.5 sec;   INR: 1.16 ratio         PTT - ( 08 Dec 2022 10:58 )  PTT:46.3 sec    PHYSICAL EXAM:  Gen: NAD  Skin: No rashes, bruises, or lesions  Head: Normocephalic, Atraumatic  Face: no edema, erythema, or fluctuance. Parotid glands soft without mass  Eyes: no scleral injection  Nose: Nares bilaterally patent, no discharge  Mouth: No lacerations noted on soft or hard palate or oral mucosa. No Stridor / Drooling / Trismus.  Mucosa moist, tongue/uvula midline, blood mixed with secretions noted in the oropharynx   Neck: Flat, supple, no lymphadenopathy, trachea midline, no masses  Lymphatic: No lymphadenopathy  Resp: breathing easily, no stridor  CV: no peripheral edema/cyanosis  GI: nondistended   Peripheral vascular: no JVD or edema  Neuro: facial nerve intact, no facial droop        Diagnostic Nasal Endoscopy: (Scope #2 used)    Fiberoptic Indirect laryngoscopy:  (Scope #2 used)        IMAGING/ADDITIONAL STUDIES:  CC: Soft palate laceration    HPI: 63 y/o male with PMH of CABG, DM, HTN, HLD presenting as transfer from [place] for STEMI. Course c/b gallstone pancreatitis leading to ARDS, shock, cardiac arrest then placed on [facility] ECMO. Pt went for ECMO decannulation today. Team states that as they were placing GRZEGORZ probe they possibly caused a soft palate laceration, as they now see mild oozing of blood in the posterior oropharynx. ENT consulted for evaluation of the laceration. Team mentions anticoagulation has been held, patient is intubated and awake/alert, follows commands.   **Include at least 4 modifiers (Onset? Duration? Quality? Radiation? Severity? Laterality? What makes it better or worse?)**      PAST MEDICAL & SURGICAL HISTORY:    Allergies    No Known Allergies    Intolerances      MEDICATIONS  (STANDING):  albumin human 25% IVPB 50 milliLiter(s) IV Intermittent every 10 minutes  aMIOdarone Infusion 1 mG/Min (33.3 mL/Hr) IV Continuous <Continuous>  atorvastatin 40 milliGRAM(s) Oral at bedtime  chlorhexidine 0.12% Liquid 15 milliLiter(s) Oral Mucosa every 12 hours  dextrose 50% Injectable 50 milliLiter(s) IV Push every 15 minutes  dextrose 50% Injectable 25 milliLiter(s) IV Push every 15 minutes  digoxin  Injectable 250 MICROGram(s) IV Push once  insulin regular Infusion 3 Unit(s)/Hr (3 mL/Hr) IV Continuous <Continuous>  pantoprazole  Injectable 40 milliGRAM(s) IV Push two times a day  polyethylene glycol 3350 17 Gram(s) Oral two times a day  senna 2 Tablet(s) Oral at bedtime  vasopressin Infusion 0.05 Unit(s)/Min (7.5 mL/Hr) IV Continuous <Continuous>    MEDICATIONS  (PRN):      Social History: no tobacco, etoh, or illegal substance use    Family history: no significant family history    ROS:   ENT: all negative except as noted in HPI   CV: denies palpitations  Pulm: denies SOB, cough, hemoptysis  GI: denies change in apetite, indigestion, n/v  : denies pertinent urinary symptoms, urgency  Neuro: denies numbness/tingling, loss of sensation  Psych: denies anxiety  MS: denies muscle weakness, instability  Heme: denies easy bruising or bleeding  Endo: denies heat/cold intolerance, excessive sweating  Vascular: denies LE edema    Vital Signs Last 24 Hrs  T(C): 38 (08 Dec 2022 15:00), Max: 38 (08 Dec 2022 13:00)  T(F): 100.4 (08 Dec 2022 15:00), Max: 100.4 (08 Dec 2022 13:00)  HR: 98 (08 Dec 2022 15:00) (74 - 134)  BP: 108/54 (08 Dec 2022 07:40) (108/54 - 108/54)  BP(mean): 76 (08 Dec 2022 07:40) (76 - 76)  RR: 19 (08 Dec 2022 15:00) (7 - 31)  SpO2: 100% (08 Dec 2022 15:00) (95% - 100%)    Parameters below as of 08 Dec 2022 07:40    O2 Flow (L/min): 40  O2 Concentration (%): 40                          10.0   17.82 )-----------( 157      ( 08 Dec 2022 10:58 )             31.1    12-08    136  |  101  |  34<H>  ----------------------------<  184<H>  4.9   |  21<L>  |  1.89<H>    Ca    8.8      08 Dec 2022 10:58  Phos  4.2     12-08  Mg     2.9     12-08    TPro  6.2  /  Alb  2.9<L>  /  TBili  1.4<H>  /  DBili  x   /  AST  60<H>  /  ALT  11  /  AlkPhos  237<H>  12-08   PT/INR - ( 08 Dec 2022 10:58 )   PT: 13.5 sec;   INR: 1.16 ratio         PTT - ( 08 Dec 2022 10:58 )  PTT:46.3 sec    PHYSICAL EXAM:  Gen: NAD  Skin: No rashes, bruises, or lesions  Head: Normocephalic, Atraumatic  Face: no edema, erythema, or fluctuance. Parotid glands soft without mass  Eyes: no scleral injection  Nose: Nares bilaterally patent, no discharge  Mouth: No lacerations noted on soft or hard palate or oral mucosa. No Stridor / Drooling / Trismus.  Mucosa moist, tongue/uvula midline, blood mixed with secretions noted in the oropharynx   Neck: Flat, supple, no lymphadenopathy, trachea midline, no masses  Lymphatic: No lymphadenopathy  Resp: breathing easily, no stridor  CV: no peripheral edema/cyanosis  GI: nondistended   Peripheral vascular: no JVD or edema  Neuro: facial nerve intact, no facial droop        Diagnostic Nasal Endoscopy: (Scope #2 used)    Fiberoptic Indirect laryngoscopy:  (Scope #2 used)        IMAGING/ADDITIONAL STUDIES:  CC: Soft palate laceration    HPI: 61 y/o male with PMH of CABG, DM, HTN, HLD presenting as transfer from [place] for STEMI. Course c/b gallstone pancreatitis leading to ARDS, shock, cardiac arrest then placed on [facility] ECMO. Pt went for ECMO decannulation today. Team states that as they were placing GRZEGORZ probe they possibly caused a soft palate laceration, as they now see mild oozing of blood in the posterior oropharynx. ENT consulted for evaluation of the laceration. Team mentions anticoagulation has been held, patient is intubated and awake/alert, follows commands.   **Include at least 4 modifiers (Onset? Duration? Quality? Radiation? Severity? Laterality? What makes it better or worse?)**      PAST MEDICAL & SURGICAL HISTORY:    Allergies    No Known Allergies    Intolerances      MEDICATIONS  (STANDING):  albumin human 25% IVPB 50 milliLiter(s) IV Intermittent every 10 minutes  aMIOdarone Infusion 1 mG/Min (33.3 mL/Hr) IV Continuous <Continuous>  atorvastatin 40 milliGRAM(s) Oral at bedtime  chlorhexidine 0.12% Liquid 15 milliLiter(s) Oral Mucosa every 12 hours  dextrose 50% Injectable 50 milliLiter(s) IV Push every 15 minutes  dextrose 50% Injectable 25 milliLiter(s) IV Push every 15 minutes  digoxin  Injectable 250 MICROGram(s) IV Push once  insulin regular Infusion 3 Unit(s)/Hr (3 mL/Hr) IV Continuous <Continuous>  pantoprazole  Injectable 40 milliGRAM(s) IV Push two times a day  polyethylene glycol 3350 17 Gram(s) Oral two times a day  senna 2 Tablet(s) Oral at bedtime  vasopressin Infusion 0.05 Unit(s)/Min (7.5 mL/Hr) IV Continuous <Continuous>    MEDICATIONS  (PRN):      Social History: no tobacco, etoh, or illegal substance use    Family history: no significant family history    ROS:   ENT: all negative except as noted in HPI   CV: denies palpitations  Pulm: denies SOB, cough, hemoptysis  GI: denies change in apetite, indigestion, n/v  : denies pertinent urinary symptoms, urgency  Neuro: denies numbness/tingling, loss of sensation  Psych: denies anxiety  MS: denies muscle weakness, instability  Heme: denies easy bruising or bleeding  Endo: denies heat/cold intolerance, excessive sweating  Vascular: denies LE edema    Vital Signs Last 24 Hrs  T(C): 38 (08 Dec 2022 15:00), Max: 38 (08 Dec 2022 13:00)  T(F): 100.4 (08 Dec 2022 15:00), Max: 100.4 (08 Dec 2022 13:00)  HR: 98 (08 Dec 2022 15:00) (74 - 134)  BP: 108/54 (08 Dec 2022 07:40) (108/54 - 108/54)  BP(mean): 76 (08 Dec 2022 07:40) (76 - 76)  RR: 19 (08 Dec 2022 15:00) (7 - 31)  SpO2: 100% (08 Dec 2022 15:00) (95% - 100%)    Parameters below as of 08 Dec 2022 07:40    O2 Flow (L/min): 40  O2 Concentration (%): 40                          10.0   17.82 )-----------( 157      ( 08 Dec 2022 10:58 )             31.1    12-08    136  |  101  |  34<H>  ----------------------------<  184<H>  4.9   |  21<L>  |  1.89<H>    Ca    8.8      08 Dec 2022 10:58  Phos  4.2     12-08  Mg     2.9     12-08    TPro  6.2  /  Alb  2.9<L>  /  TBili  1.4<H>  /  DBili  x   /  AST  60<H>  /  ALT  11  /  AlkPhos  237<H>  12-08   PT/INR - ( 08 Dec 2022 10:58 )   PT: 13.5 sec;   INR: 1.16 ratio         PTT - ( 08 Dec 2022 10:58 )  PTT:46.3 sec    PHYSICAL EXAM:  Gen: NAD  Skin: No rashes, bruises, or lesions  Head: Normocephalic, Atraumatic  Face: no edema, erythema, or fluctuance. Parotid glands soft without mass  Eyes: no scleral injection  Nose: Nares bilaterally patent, no discharge  Mouth: No lacerations noted on soft or hard palate or oral mucosa. No Stridor / Drooling / Trismus.  Mucosa moist, tongue/uvula midline, blood mixed with secretions noted in the oropharynx   Neck: Flat, supple, no lymphadenopathy, trachea midline, no masses  Lymphatic: No lymphadenopathy  Resp: breathing easily, no stridor  CV: no peripheral edema/cyanosis  GI: nondistended   Peripheral vascular: no JVD or edema  Neuro: facial nerve intact, no facial droop        Diagnostic Nasal Endoscopy: (Scope #2 used)    Fiberoptic Indirect laryngoscopy:  (Scope #2 used)        IMAGING/ADDITIONAL STUDIES:  CC: Soft palate laceration    HPI: 63 y/o male with PMH of CABG, DM, HTN, HLD presenting as transfer from [place] for STEMI. Course c/b gallstone pancreatitis leading to ARDS, shock, cardiac arrest then placed on [facility] ECMO. Pt underwent ECMO decannulation today. Team states that as they were placing GRZEGORZ probe they possibly caused a soft palate laceration, as they now see mild oozing of blood in the posterior oropharynx. ENT consulted for evaluation. Team mentions anticoagulation has been held, patient is intubated and awake/alert, follows commands. Pt denies fever, chills, n/v, HA, SOB, dizziness, or changes in hearing.       PAST MEDICAL & SURGICAL HISTORY:    Allergies    No Known Allergies    Intolerances      MEDICATIONS  (STANDING):  albumin human 25% IVPB 50 milliLiter(s) IV Intermittent every 10 minutes  aMIOdarone Infusion 1 mG/Min (33.3 mL/Hr) IV Continuous <Continuous>  atorvastatin 40 milliGRAM(s) Oral at bedtime  chlorhexidine 0.12% Liquid 15 milliLiter(s) Oral Mucosa every 12 hours  dextrose 50% Injectable 50 milliLiter(s) IV Push every 15 minutes  dextrose 50% Injectable 25 milliLiter(s) IV Push every 15 minutes  digoxin  Injectable 250 MICROGram(s) IV Push once  insulin regular Infusion 3 Unit(s)/Hr (3 mL/Hr) IV Continuous <Continuous>  pantoprazole  Injectable 40 milliGRAM(s) IV Push two times a day  polyethylene glycol 3350 17 Gram(s) Oral two times a day  senna 2 Tablet(s) Oral at bedtime  vasopressin Infusion 0.05 Unit(s)/Min (7.5 mL/Hr) IV Continuous <Continuous>    MEDICATIONS  (PRN):      Social History: no tobacco, etoh, or illegal substance use    Family history: no significant family history    ROS:   ENT: all negative except as noted in HPI   CV: denies palpitations  Pulm: denies SOB, cough, hemoptysis  GI: denies change in apetite, indigestion, n/v  : denies pertinent urinary symptoms, urgency  Neuro: denies numbness/tingling, loss of sensation  Psych: denies anxiety  MS: denies muscle weakness, instability  Heme: denies easy bruising or bleeding  Endo: denies heat/cold intolerance, excessive sweating  Vascular: denies LE edema    Vital Signs Last 24 Hrs  T(C): 38 (08 Dec 2022 15:00), Max: 38 (08 Dec 2022 13:00)  T(F): 100.4 (08 Dec 2022 15:00), Max: 100.4 (08 Dec 2022 13:00)  HR: 98 (08 Dec 2022 15:00) (74 - 134)  BP: 108/54 (08 Dec 2022 07:40) (108/54 - 108/54)  BP(mean): 76 (08 Dec 2022 07:40) (76 - 76)  RR: 19 (08 Dec 2022 15:00) (7 - 31)  SpO2: 100% (08 Dec 2022 15:00) (95% - 100%)    Parameters below as of 08 Dec 2022 07:40    O2 Flow (L/min): 40  O2 Concentration (%): 40                          10.0   17.82 )-----------( 157      ( 08 Dec 2022 10:58 )             31.1    12-08    136  |  101  |  34<H>  ----------------------------<  184<H>  4.9   |  21<L>  |  1.89<H>    Ca    8.8      08 Dec 2022 10:58  Phos  4.2     12-08  Mg     2.9     12-08    TPro  6.2  /  Alb  2.9<L>  /  TBili  1.4<H>  /  DBili  x   /  AST  60<H>  /  ALT  11  /  AlkPhos  237<H>  12-08   PT/INR - ( 08 Dec 2022 10:58 )   PT: 13.5 sec;   INR: 1.16 ratio         PTT - ( 08 Dec 2022 10:58 )  PTT:46.3 sec    PHYSICAL EXAM:  Gen: NAD, intubated, awake and alert  Skin: No rashes, bruises, or lesions  Head: Normocephalic, Atraumatic  Face: no edema, erythema, or fluctuance. Parotid glands soft without mass  Eyes: no scleral injection  Nose: +NGT in right nare. Nares bilaterally patent, no discharge  Mouth: No lacerations noted on soft or hard palate or oral mucosa. No Stridor / Drooling / Trismus.  Mucosa moist, tongue/uvula midline, blood mixed with secretions noted in the oropharynx and suctioned out  Neck: Flat, supple, no lymphadenopathy, trachea midline, no masses  Lymphatic: No lymphadenopathy  Resp: breathing easily on vent, no stridor  CV: no peripheral edema/cyanosis  GI: nondistended   Peripheral vascular: no JVD or edema  Neuro: facial nerve intact, no facial droop      Fiberoptic Indirect Laryngoscopy (scope #3 used):  Reason for Laryngoscopy: evaluate for oral bleeding    Patient was unable to cooperate with mirror.  Blood clot noted in posterior nasopharynx partially removed by suction, no active bleeding. Clotted area most likely secondary to NG tube trauma. Blood mixed with secretions noted in posterior nasopharynx and oropharynx. No active bleeding. Scope limited due to secretions as well as ET tube.    CC: Soft palate laceration    HPI: 61 y/o male with PMH of CABG, DM, HTN, HLD presenting as transfer from [place] for STEMI. Course c/b gallstone pancreatitis leading to ARDS, shock, cardiac arrest then placed on [facility] ECMO. Pt underwent ECMO decannulation today. Team states that as they were placing GRZEGORZ probe they possibly caused a soft palate laceration, as they now see mild oozing of blood in the posterior oropharynx. ENT consulted for evaluation. Team mentions anticoagulation has been held, patient is intubated and awake/alert, follows commands. Pt denies fever, chills, n/v, HA, SOB, dizziness, or changes in hearing.       PAST MEDICAL & SURGICAL HISTORY:    Allergies    No Known Allergies    Intolerances      MEDICATIONS  (STANDING):  albumin human 25% IVPB 50 milliLiter(s) IV Intermittent every 10 minutes  aMIOdarone Infusion 1 mG/Min (33.3 mL/Hr) IV Continuous <Continuous>  atorvastatin 40 milliGRAM(s) Oral at bedtime  chlorhexidine 0.12% Liquid 15 milliLiter(s) Oral Mucosa every 12 hours  dextrose 50% Injectable 50 milliLiter(s) IV Push every 15 minutes  dextrose 50% Injectable 25 milliLiter(s) IV Push every 15 minutes  digoxin  Injectable 250 MICROGram(s) IV Push once  insulin regular Infusion 3 Unit(s)/Hr (3 mL/Hr) IV Continuous <Continuous>  pantoprazole  Injectable 40 milliGRAM(s) IV Push two times a day  polyethylene glycol 3350 17 Gram(s) Oral two times a day  senna 2 Tablet(s) Oral at bedtime  vasopressin Infusion 0.05 Unit(s)/Min (7.5 mL/Hr) IV Continuous <Continuous>    MEDICATIONS  (PRN):      Social History: no tobacco, etoh, or illegal substance use    Family history: no significant family history    ROS:   ENT: all negative except as noted in HPI   CV: denies palpitations  Pulm: denies SOB, cough, hemoptysis  GI: denies change in apetite, indigestion, n/v  : denies pertinent urinary symptoms, urgency  Neuro: denies numbness/tingling, loss of sensation  Psych: denies anxiety  MS: denies muscle weakness, instability  Heme: denies easy bruising or bleeding  Endo: denies heat/cold intolerance, excessive sweating  Vascular: denies LE edema    Vital Signs Last 24 Hrs  T(C): 38 (08 Dec 2022 15:00), Max: 38 (08 Dec 2022 13:00)  T(F): 100.4 (08 Dec 2022 15:00), Max: 100.4 (08 Dec 2022 13:00)  HR: 98 (08 Dec 2022 15:00) (74 - 134)  BP: 108/54 (08 Dec 2022 07:40) (108/54 - 108/54)  BP(mean): 76 (08 Dec 2022 07:40) (76 - 76)  RR: 19 (08 Dec 2022 15:00) (7 - 31)  SpO2: 100% (08 Dec 2022 15:00) (95% - 100%)    Parameters below as of 08 Dec 2022 07:40    O2 Flow (L/min): 40  O2 Concentration (%): 40                          10.0   17.82 )-----------( 157      ( 08 Dec 2022 10:58 )             31.1    12-08    136  |  101  |  34<H>  ----------------------------<  184<H>  4.9   |  21<L>  |  1.89<H>    Ca    8.8      08 Dec 2022 10:58  Phos  4.2     12-08  Mg     2.9     12-08    TPro  6.2  /  Alb  2.9<L>  /  TBili  1.4<H>  /  DBili  x   /  AST  60<H>  /  ALT  11  /  AlkPhos  237<H>  12-08   PT/INR - ( 08 Dec 2022 10:58 )   PT: 13.5 sec;   INR: 1.16 ratio         PTT - ( 08 Dec 2022 10:58 )  PTT:46.3 sec    PHYSICAL EXAM:  Gen: NAD, intubated, awake and alert  Skin: No rashes, bruises, or lesions  Head: Normocephalic, Atraumatic  Face: no edema, erythema, or fluctuance. Parotid glands soft without mass  Eyes: no scleral injection  Nose: +NGT in right nare. Nares bilaterally patent, no discharge  Mouth: No lacerations noted on soft or hard palate or oral mucosa. No Stridor / Drooling / Trismus.  Mucosa moist, tongue/uvula midline, blood mixed with secretions noted in the oropharynx and suctioned out  Neck: Flat, supple, no lymphadenopathy, trachea midline, no masses  Lymphatic: No lymphadenopathy  Resp: breathing easily on vent, no stridor  CV: no peripheral edema/cyanosis  GI: nondistended   Peripheral vascular: no JVD or edema  Neuro: facial nerve intact, no facial droop      Fiberoptic Indirect Laryngoscopy (scope #3 used):  Reason for Laryngoscopy: evaluate for oral bleeding    Patient was unable to cooperate with mirror.  Blood clot noted in posterior nasopharynx partially removed by suction, no active bleeding. Clotted area most likely secondary to NG tube trauma. Blood mixed with secretions noted in posterior nasopharynx and oropharynx. No active bleeding. Scope limited due to secretions as well as ET tube.    CC: Soft palate laceration    HPI: 61 y/o male with PMH of CABG, DM, HTN, HLD presenting as transfer from [place] for STEMI. Course c/b gallstone pancreatitis leading to ARDS, shock, cardiac arrest then placed on [facility] ECMO. Pt underwent ECMO decannulation today. Team states that as they were placing GRZEGORZ probe they possibly caused a soft palate laceration, as they now see mild oozing of blood in the posterior oropharynx. ENT consulted for evaluation. Team mentions anticoagulation has been held, patient is intubated and awake/alert, follows commands. Pt denies fever, chills, n/v, HA, SOB, dizziness, or changes in hearing.       PAST MEDICAL & SURGICAL HISTORY:    Allergies    No Known Allergies    Intolerances      MEDICATIONS  (STANDING):  albumin human 25% IVPB 50 milliLiter(s) IV Intermittent every 10 minutes  aMIOdarone Infusion 1 mG/Min (33.3 mL/Hr) IV Continuous <Continuous>  atorvastatin 40 milliGRAM(s) Oral at bedtime  chlorhexidine 0.12% Liquid 15 milliLiter(s) Oral Mucosa every 12 hours  dextrose 50% Injectable 50 milliLiter(s) IV Push every 15 minutes  dextrose 50% Injectable 25 milliLiter(s) IV Push every 15 minutes  digoxin  Injectable 250 MICROGram(s) IV Push once  insulin regular Infusion 3 Unit(s)/Hr (3 mL/Hr) IV Continuous <Continuous>  pantoprazole  Injectable 40 milliGRAM(s) IV Push two times a day  polyethylene glycol 3350 17 Gram(s) Oral two times a day  senna 2 Tablet(s) Oral at bedtime  vasopressin Infusion 0.05 Unit(s)/Min (7.5 mL/Hr) IV Continuous <Continuous>    MEDICATIONS  (PRN):      Social History: no tobacco, etoh, or illegal substance use    Family history: no significant family history    ROS:   ENT: all negative except as noted in HPI   CV: denies palpitations  Pulm: denies SOB, cough, hemoptysis  GI: denies change in apetite, indigestion, n/v  : denies pertinent urinary symptoms, urgency  Neuro: denies numbness/tingling, loss of sensation  Psych: denies anxiety  MS: denies muscle weakness, instability  Heme: denies easy bruising or bleeding  Endo: denies heat/cold intolerance, excessive sweating  Vascular: denies LE edema    Vital Signs Last 24 Hrs  T(C): 38 (08 Dec 2022 15:00), Max: 38 (08 Dec 2022 13:00)  T(F): 100.4 (08 Dec 2022 15:00), Max: 100.4 (08 Dec 2022 13:00)  HR: 98 (08 Dec 2022 15:00) (74 - 134)  BP: 108/54 (08 Dec 2022 07:40) (108/54 - 108/54)  BP(mean): 76 (08 Dec 2022 07:40) (76 - 76)  RR: 19 (08 Dec 2022 15:00) (7 - 31)  SpO2: 100% (08 Dec 2022 15:00) (95% - 100%)    Parameters below as of 08 Dec 2022 07:40    O2 Flow (L/min): 40  O2 Concentration (%): 40                          10.0   17.82 )-----------( 157      ( 08 Dec 2022 10:58 )             31.1    12-08    136  |  101  |  34<H>  ----------------------------<  184<H>  4.9   |  21<L>  |  1.89<H>    Ca    8.8      08 Dec 2022 10:58  Phos  4.2     12-08  Mg     2.9     12-08    TPro  6.2  /  Alb  2.9<L>  /  TBili  1.4<H>  /  DBili  x   /  AST  60<H>  /  ALT  11  /  AlkPhos  237<H>  12-08   PT/INR - ( 08 Dec 2022 10:58 )   PT: 13.5 sec;   INR: 1.16 ratio         PTT - ( 08 Dec 2022 10:58 )  PTT:46.3 sec    PHYSICAL EXAM:  Gen: NAD, intubated, awake and alert  Skin: No rashes, bruises, or lesions  Head: Normocephalic, Atraumatic  Face: no edema, erythema, or fluctuance. Parotid glands soft without mass  Eyes: no scleral injection  Nose: +NGT in right nare. Nares bilaterally patent, no discharge  Mouth: No lacerations noted on soft or hard palate or oral mucosa. No Stridor / Drooling / Trismus.  Mucosa moist, tongue/uvula midline, blood mixed with secretions noted in the oropharynx and suctioned out  Neck: Flat, supple, no lymphadenopathy, trachea midline, no masses  Lymphatic: No lymphadenopathy  Resp: breathing easily on vent, no stridor  CV: no peripheral edema/cyanosis  GI: nondistended   Peripheral vascular: no JVD or edema  Neuro: facial nerve intact, no facial droop      Fiberoptic Indirect Laryngoscopy (scope #3 used):  Reason for Laryngoscopy: evaluate for oral bleeding    Patient was unable to cooperate with mirror.  Blood clot noted in posterior nasopharynx partially removed by suction, no active bleeding. Clotted area most likely secondary to NG tube trauma. Blood mixed with secretions noted in posterior nasopharynx and oropharynx. No active bleeding. Scope limited due to secretions as well as ET tube.

## 2022-12-08 NOTE — PROGRESS NOTE ADULT - SUBJECTIVE AND OBJECTIVE BOX
[facility] DIVISION OF KIDNEY DISEASES AND HYPERTENSION   FOLLOW UP NOTE    --------------------------------------------------------------------------------  HPI:  63 y/o male with PMH of CABG, DM, HTN, HLD presenting as transfer from [place] for c/f STEMI. Course c/b gallstone pancreatitis leading to ARDS and shock & cardiac arrest now on [facility] ECMO. Had significant troponin elevation and his LVEF down to 8%. Pt was deemed to be too unstable to have an angiogram and potential PCI due to his co-morbidities & a new subdural bleed. Pt being seen for ERIC. SCr on arrival was 2.15; trended down to hector 1.6 on 11/25 and eventually increased to 3.95 11/28. Pt received IV diuretics as per CTU.    Patient seen & examined today. Pt is intubated, unable to obtain ROS. Pt initiated on CRRT on 12/5/22 to optimize volume status and electrolytes. Pt tolerating CRRT overnight with target net negative fluid balance. Pt underwent dennaculation of ECMO today and was taken off CRRT.     PAST HISTORY  --------------------------------------------------------------------------------  No significant changes to PMH, PSH, FHx, SHx, unless otherwise noted    ALLERGIES & MEDICATIONS  --------------------------------------------------------------------------------  Allergies    No Known Allergies    Intolerances      Standing Inpatient Medications  aMIOdarone Infusion 1 mG/Min IV Continuous <Continuous>  aMIOdarone IVPB 150 milliGRAM(s) IV Intermittent once  aMIOdarone IVPB 150 milliGRAM(s) IV Intermittent once  atorvastatin 40 milliGRAM(s) Oral at bedtime  chlorhexidine 0.12% Liquid 15 milliLiter(s) Oral Mucosa every 12 hours  digoxin  Injectable 250 MICROGram(s) IV Push once  digoxin  Injectable 250 MICROGram(s) IV Push once  digoxin  Injectable 500 MICROGram(s) IV Push once  pantoprazole  Injectable 40 milliGRAM(s) IV Push two times a day  polyethylene glycol 3350 17 Gram(s) Oral two times a day  senna 2 Tablet(s) Oral at bedtime    PRN Inpatient Medications      REVIEW OF SYSTEMS  --------------------------------------------------------------------------------  Unable to obtain    VITALS/PHYSICAL EXAM  --------------------------------------------------------------------------------  T(C): 36.3 (12-08-22 @ 07:40), Max: 36.7 (12-08-22 @ 00:00)  HR: 128 (12-08-22 @ 10:45) (72 - 128)  BP: 108/54 (12-08-22 @ 07:40) (108/54 - 108/54)  RR: 14 (12-08-22 @ 07:45) (7 - 31)  SpO2: 100% (12-08-22 @ 10:45) (95% - 100%)  Wt(kg): --  Height (cm): 172.7 (12-08-22 @ 07:40)  Weight (kg): 92.9 (12-08-22 @ 07:40)  BMI (kg/m2): 31.1 (12-08-22 @ 07:40)  BSA (m2): 2.06 (12-08-22 @ 07:40)      12-07-22 @ 07:01  -  12-08-22 @ 07:00  --------------------------------------------------------  IN: 2067.2 mL / OUT: 3058 mL / NET: -990.8 mL      Physical Exam:  	Gen: ill appearing male intubated   	HEENT: Anicteric  	Pulm: on CMV via ETT+  	CV: S1S2+  	Abd: Soft, +BS       	Ext: LE edema B/L++  	Neuro: sedated             : De Dios cath+ with concentrated urine in the bag  	Skin: Warm and dry      LABS/STUDIES  --------------------------------------------------------------------------------              10.0   17.82 >-----------<  157      [12-08-22 @ 10:58]              31.1     136  |  101  |  34  ----------------------------<  184      [12-08-22 @ 10:58]  4.9   |  21  |  1.89        Ca     8.8     [12-08-22 @ 10:58]      Mg     2.9     [12-08-22 @ 10:58]      Phos  4.2     [12-08-22 @ 10:58]    Creatinine Trend:  SCr 1.89 [12-08 @ 10:58]  SCr 1.66 [12-08 @ 00:36]  SCr 1.91 [12-07 @ 01:32]  SCr 2.28 [12-06 @ 15:25]  SCr 2.51 [12-06 @ 00:40]    Urinalysis - [12-03-22 @ 14:45]      Color BROWN / Appearance Slightly Turbid / SG 1.015 / pH 5.5      Gluc Negative / Ketone Negative  / Bili Negative / Urobili 6 mg/dL       Blood Moderate / Protein 30 mg/dL / Leuk Est Negative / Nitrite Negative      RBC 43 / WBC 2 / Hyaline 2 / Gran  / Sq Epi  / Non Sq Epi 1 / Bacteria Negative    Urine Osmolality 426      [12-03-22 @ 10:37]     [facility] DIVISION OF KIDNEY DISEASES AND HYPERTENSION   FOLLOW UP NOTE    --------------------------------------------------------------------------------  HPI:  63 y/o male with PMH of CABG, DM, HTN, HLD presenting as transfer from [place] for c/f STEMI. Course c/b gallstone pancreatitis leading to ARDS and shock & cardiac arrest now on VA ECMO. Had significant troponin elevation and his LVEF down to 8%. Pt was deemed to be too unstable to have an angiogram and potential PCI due to his co-morbidities & a new subdural bleed. Pt being seen for ERIC. SCr on arrival was 2.15; trended down to hector 1.6 on 11/25 and eventually increased to 3.95 11/28. Pt received IV diuretics as per CTU.    Patient seen & examined today. Pt is intubated, unable to obtain ROS. Pt initiated on CRRT on 12/5/22 to optimize volume status and electrolytes. Pt tolerating CRRT overnight with target net negative fluid balance. Pt underwent dennaculation of ECMO today and was taken off CRRT.     PAST HISTORY  --------------------------------------------------------------------------------  No significant changes to PMH, PSH, FHx, SHx, unless otherwise noted    ALLERGIES & MEDICATIONS  --------------------------------------------------------------------------------  Allergies    No Known Allergies    Intolerances      Standing Inpatient Medications  aMIOdarone Infusion 1 mG/Min IV Continuous <Continuous>  aMIOdarone IVPB 150 milliGRAM(s) IV Intermittent once  aMIOdarone IVPB 150 milliGRAM(s) IV Intermittent once  atorvastatin 40 milliGRAM(s) Oral at bedtime  chlorhexidine 0.12% Liquid 15 milliLiter(s) Oral Mucosa every 12 hours  digoxin  Injectable 250 MICROGram(s) IV Push once  digoxin  Injectable 250 MICROGram(s) IV Push once  digoxin  Injectable 500 MICROGram(s) IV Push once  pantoprazole  Injectable 40 milliGRAM(s) IV Push two times a day  polyethylene glycol 3350 17 Gram(s) Oral two times a day  senna 2 Tablet(s) Oral at bedtime    PRN Inpatient Medications      REVIEW OF SYSTEMS  --------------------------------------------------------------------------------  Unable to obtain    VITALS/PHYSICAL EXAM  --------------------------------------------------------------------------------  T(C): 36.3 (12-08-22 @ 07:40), Max: 36.7 (12-08-22 @ 00:00)  HR: 128 (12-08-22 @ 10:45) (72 - 128)  BP: 108/54 (12-08-22 @ 07:40) (108/54 - 108/54)  RR: 14 (12-08-22 @ 07:45) (7 - 31)  SpO2: 100% (12-08-22 @ 10:45) (95% - 100%)  Wt(kg): --  Height (cm): 172.7 (12-08-22 @ 07:40)  Weight (kg): 92.9 (12-08-22 @ 07:40)  BMI (kg/m2): 31.1 (12-08-22 @ 07:40)  BSA (m2): 2.06 (12-08-22 @ 07:40)      12-07-22 @ 07:01  -  12-08-22 @ 07:00  --------------------------------------------------------  IN: 2067.2 mL / OUT: 3058 mL / NET: -990.8 mL      Physical Exam:  	Gen: ill appearing male intubated   	HEENT: Anicteric  	Pulm: on CMV via ETT+  	CV: S1S2+  	Abd: Soft, +BS       	Ext: LE edema B/L++  	Neuro: sedated             : De Dios cath+ with concentrated urine in the bag  	Skin: Warm and dry      LABS/STUDIES  --------------------------------------------------------------------------------              10.0   17.82 >-----------<  157      [12-08-22 @ 10:58]              31.1     136  |  101  |  34  ----------------------------<  184      [12-08-22 @ 10:58]  4.9   |  21  |  1.89        Ca     8.8     [12-08-22 @ 10:58]      Mg     2.9     [12-08-22 @ 10:58]      Phos  4.2     [12-08-22 @ 10:58]    Creatinine Trend:  SCr 1.89 [12-08 @ 10:58]  SCr 1.66 [12-08 @ 00:36]  SCr 1.91 [12-07 @ 01:32]  SCr 2.28 [12-06 @ 15:25]  SCr 2.51 [12-06 @ 00:40]    Urinalysis - [12-03-22 @ 14:45]      Color BROWN / Appearance Slightly Turbid / SG 1.015 / pH 5.5      Gluc Negative / Ketone Negative  / Bili Negative / Urobili 6 mg/dL       Blood Moderate / Protein 30 mg/dL / Leuk Est Negative / Nitrite Negative      RBC 43 / WBC 2 / Hyaline 2 / Gran  / Sq Epi  / Non Sq Epi 1 / Bacteria Negative    Urine Osmolality 426      [12-03-22 @ 10:37]     [facility] DIVISION OF KIDNEY DISEASES AND HYPERTENSION   FOLLOW UP NOTE    --------------------------------------------------------------------------------  HPI:  63 y/o male with PMH of CABG, DM, HTN, HLD presenting as transfer from [place] for c/f STEMI. Course c/b gallstone pancreatitis leading to ARDS and shock & cardiac arrest now on VA ECMO. Had significant troponin elevation and his LVEF down to 8%. Pt was deemed to be too unstable to have an angiogram and potential PCI due to his co-morbidities & a new subdural bleed. Pt being seen for ERIC. SCr on arrival was 2.15; trended down to hector 1.6 on 11/25 and eventually increased to 3.95 11/28. Pt received IV diuretics as per CTU.    Patient seen & examined today. Pt is intubated, unable to obtain ROS. Pt initiated on CRRT on 12/5/22 to optimize volume status and electrolytes. Pt tolerating CRRT overnight with target net negative fluid balance. Pt underwent dennaculation of ECMO today and was taken off CRRT.     PAST HISTORY  --------------------------------------------------------------------------------  No significant changes to PMH, PSH, FHx, SHx, unless otherwise noted    ALLERGIES & MEDICATIONS  --------------------------------------------------------------------------------  Allergies    No Known Allergies    Intolerances      Standing Inpatient Medications  aMIOdarone Infusion 1 mG/Min IV Continuous <Continuous>  aMIOdarone IVPB 150 milliGRAM(s) IV Intermittent once  aMIOdarone IVPB 150 milliGRAM(s) IV Intermittent once  atorvastatin 40 milliGRAM(s) Oral at bedtime  chlorhexidine 0.12% Liquid 15 milliLiter(s) Oral Mucosa every 12 hours  digoxin  Injectable 250 MICROGram(s) IV Push once  digoxin  Injectable 250 MICROGram(s) IV Push once  digoxin  Injectable 500 MICROGram(s) IV Push once  pantoprazole  Injectable 40 milliGRAM(s) IV Push two times a day  polyethylene glycol 3350 17 Gram(s) Oral two times a day  senna 2 Tablet(s) Oral at bedtime    PRN Inpatient Medications      REVIEW OF SYSTEMS  --------------------------------------------------------------------------------  Unable to obtain    VITALS/PHYSICAL EXAM  --------------------------------------------------------------------------------  T(C): 36.3 (12-08-22 @ 07:40), Max: 36.7 (12-08-22 @ 00:00)  HR: 128 (12-08-22 @ 10:45) (72 - 128)  BP: 108/54 (12-08-22 @ 07:40) (108/54 - 108/54)  RR: 14 (12-08-22 @ 07:45) (7 - 31)  SpO2: 100% (12-08-22 @ 10:45) (95% - 100%)  Wt(kg): --  Height (cm): 172.7 (12-08-22 @ 07:40)  Weight (kg): 92.9 (12-08-22 @ 07:40)  BMI (kg/m2): 31.1 (12-08-22 @ 07:40)  BSA (m2): 2.06 (12-08-22 @ 07:40)      12-07-22 @ 07:01  -  12-08-22 @ 07:00  --------------------------------------------------------  IN: 2067.2 mL / OUT: 3058 mL / NET: -990.8 mL      Physical Exam:  	Gen: ill appearing male intubated   	HEENT: Anicteric  	Pulm: on CMV via ETT+  	CV: S1S2+  	Abd: Soft, +BS       	Ext: LE edema B/L++  	Neuro: sedated             : De Dios cath+ with concentrated urine in the bag  	Skin: Warm and dry      LABS/STUDIES  --------------------------------------------------------------------------------              10.0   17.82 >-----------<  157      [12-08-22 @ 10:58]              31.1     136  |  101  |  34  ----------------------------<  184      [12-08-22 @ 10:58]  4.9   |  21  |  1.89        Ca     8.8     [12-08-22 @ 10:58]      Mg     2.9     [12-08-22 @ 10:58]      Phos  4.2     [12-08-22 @ 10:58]    Creatinine Trend:  SCr 1.89 [12-08 @ 10:58]  SCr 1.66 [12-08 @ 00:36]  SCr 1.91 [12-07 @ 01:32]  SCr 2.28 [12-06 @ 15:25]  SCr 2.51 [12-06 @ 00:40]    Urinalysis - [12-03-22 @ 14:45]      Color BROWN / Appearance Slightly Turbid / SG 1.015 / pH 5.5      Gluc Negative / Ketone Negative  / Bili Negative / Urobili 6 mg/dL       Blood Moderate / Protein 30 mg/dL / Leuk Est Negative / Nitrite Negative      RBC 43 / WBC 2 / Hyaline 2 / Gran  / Sq Epi  / Non Sq Epi 1 / Bacteria Negative    Urine Osmolality 426      [12-03-22 @ 10:37]

## 2022-12-08 NOTE — PROGRESS NOTE ADULT - ATTENDING COMMENTS
ECMO decannulation  off CRRT today  Can hold and reassess tomorrow  Observe for renal recovery    Melissa Mercer MD  Off: [phone]  contact me on teams    (After 5 pm or on weekends please page the on-call fellow/attending, can check [email or web address] for schedule. Login is carmen cantor, schedule under [facility] medicine, psych, derm) ECMO decannulation  off CRRT today  Can hold and reassess tomorrow  Observe for renal recovery    Melissa Mercer MD  Off: [phone]  contact me on teams    (After 5 pm or on weekends please page the on-call fellow/attending, can check [email or web address] for schedule. Login is carmen cantor, schedule under [facility] medicine, psych, derm) ECMO decannulation  off CRRT today  Can hold and reassess tomorrow  Observe for renal recovery    Melissa Mercer MD  Off: [phone]  contact me on teams    (After 5 pm or on weekends please page the on-call fellow/attending, can check [email or web address] for schedule. Login is carmen cantor, schedule under [facility] medicine, psych, derm)

## 2022-12-08 NOTE — CONSULT NOTE ADULT - ASSESSMENT
61 y/o male with PMH of CABG, DM, HTN, HLD presenting as transfer from [place] for STEMI. Course c/b gallstone pancreatitis leading to ARDS, shock, cardiac arrest then placed on VA ECMO. Pt underwent ECMO decannulation. Question of possible soft palate laceration by team after placing GRZEGORZ probe. Currently intubated, right NGT in place. On exam, no lacerations noted on soft or hard palate or oral mucosa.  Indirect laryngoscopy revealed blood clot in posterior nasopharynx that was partially removed by suction, no active bleeding. Clotted area most likely secondary to NG tube trauma. Scope limited due to secretions as well as ET tube. Anticoagulation currently being held.   63 y/o male with PMH of CABG, DM, HTN, HLD presenting as transfer from [place] for STEMI. Course c/b gallstone pancreatitis leading to ARDS, shock, cardiac arrest then placed on VA ECMO. Pt underwent ECMO decannulation. Question of possible soft palate laceration by team after placing GRZEGORZ probe. Currently intubated, right NGT in place. On exam, no lacerations noted on soft or hard palate or oral mucosa.  Indirect laryngoscopy revealed blood clot in posterior nasopharynx that was partially removed by suction, no active bleeding. Clotted area most likely secondary to NG tube trauma. Scope limited due to secretions as well as ET tube. Anticoagulation currently being held.   61 y/o male with PMH of CABG, DM, HTN, HLD presenting as transfer from [place] for STEMI. Course c/b gallstone pancreatitis leading to ARDS, shock, cardiac arrest then placed on VA ECMO. Pt underwent ECMO decannulation. Question of possible soft palate laceration by team after placing GRZEGORZ probe. Currently intubated, right NGT in place. On exam, no lacerations noted on soft or hard palate or oral mucosa.  Indirect laryngoscopy revealed blood clot in posterior nasopharynx that was partially removed by suction, no active bleeding. Clotted area most likely secondary to NG tube trauma. Scope limited due to secretions as well as ET tube. Anticoagulation currently being held.

## 2022-12-08 NOTE — PROGRESS NOTE ADULT - PROBLEM SELECTOR PLAN 2
- CT abd showing acute pancreatitis  - RUQ showing sludge, no stones  - lipase > 3000 11/24, normalized prior and now fluctuating  - conservative care  - appreciate GI recs, no intervention planned   - surgery following   - off of aBx, appreciate ID recs.

## 2022-12-08 NOTE — CONSULT NOTE ADULT - NS ATTEND AMEND GEN_ALL_CORE FT
ENT Consulted for possible soft palate laceration by team after placing GRZEGORZ probe. Currently intubated, right NGT in place. Physical exam shows no lacerations noted on soft or hard palate or oral mucosa.  Indirect laryngoscopy revealed blood clot in posterior nasopharynx that was partially removed by suction, no active bleeding. Clotted area most likely secondary to NG tube trauma. Scope limited due to secretions as well as ET tube. Call ENT as needed if any active bleeding.

## 2022-12-08 NOTE — PROGRESS NOTE ADULT - PROBLEM SELECTOR PLAN 1
- troponin peaked at > 07196   - [facility] 12/06 with IABP placement revealed that 3 grafts are closed w/ distal native disease from remaining LAD graft  - continue statin  - would start ASA when okay with surgical team. - troponin peaked at > 81022   - [facility] 12/06 with IABP placement revealed that 3 grafts are closed w/ distal native disease from remaining LAD graft  - continue statin  - would start ASA when okay with surgical team. - troponin peaked at > 94537   - [facility] 12/06 with IABP placement revealed that 3 grafts are closed w/ distal native disease from remaining LAD graft  - continue statin  - would start ASA when okay with surgical team.

## 2022-12-08 NOTE — PRE-ANESTHESIA EVALUATION ADULT - NSANTHPMHFT_GEN_ALL_CORE
62M CAD s/p CABG, HTN, T2DM p/w acute pancreatitis c/b cardiac arrest s/p VA ECMO, EF: 30-40%, mod MR. 62M CAD s/p CABG, HTN, T2DM p/w acute pancreatitis c/b cardiac arrest s/p VA ECMO, EF: 30-40%, mod MR.  Stable SDHs.

## 2022-12-08 NOTE — PROGRESS NOTE ADULT - PROBLEM SELECTOR PLAN 5
- small 3mm subdural hemorrhage stable on repeat CTs  - okay to continue argatroban, appreciate neuro recs  - keep MAP < 75 mmHg and careful monitoring of PTT

## 2022-12-08 NOTE — CONSULT NOTE ADULT - PROBLEM SELECTOR RECOMMENDATION 9
- continue to monitor for bleeding  - no further ENT intervention  - call with questions or concerns

## 2022-12-09 DIAGNOSIS — I47.1 SUPRAVENTRICULAR TACHYCARDIA: ICD-10-CM

## 2022-12-09 DIAGNOSIS — R50.9 FEVER, UNSPECIFIED: ICD-10-CM

## 2022-12-09 LAB
ALBUMIN SERPL ELPH-MCNC: 2.9 G/DL — LOW (ref 3.3–5)
ALBUMIN SERPL ELPH-MCNC: 3 G/DL — LOW (ref 3.3–5)
ALP SERPL-CCNC: 186 U/L — HIGH (ref 40–120)
ALT FLD-CCNC: 14 U/L — SIGNIFICANT CHANGE UP (ref 10–45)
ALT FLD-CCNC: 9 U/L — LOW (ref 10–45)
AMYLASE P1 CFR SERPL: 210 U/L — HIGH (ref 25–125)
ANION GAP SERPL CALC-SCNC: 14 MMOL/L — SIGNIFICANT CHANGE UP (ref 5–17)
ANION GAP SERPL CALC-SCNC: 15 MMOL/L — SIGNIFICANT CHANGE UP (ref 5–17)
APTT BLD: 37.8 SEC — HIGH (ref 27.5–35.5)
APTT BLD: 39 SEC — HIGH (ref 27.5–35.5)
APTT BLD: 40.5 SEC — HIGH (ref 27.5–35.5)
APTT BLD: 42.5 SEC — HIGH (ref 27.5–35.5)
AST SERPL-CCNC: 56 U/L — HIGH (ref 10–40)
AST SERPL-CCNC: 57 U/L — HIGH (ref 10–40)
BASE EXCESS BLDV CALC-SCNC: -0.3 MMOL/L — SIGNIFICANT CHANGE UP (ref -2–3)
BASE EXCESS BLDV CALC-SCNC: -3.6 MMOL/L — LOW (ref -2–3)
BASE EXCESS BLDV CALC-SCNC: -6.8 MMOL/L — LOW (ref -2–3)
BASE EXCESS BLDV CALC-SCNC: -8.3 MMOL/L — LOW (ref -2–3)
BILIRUB SERPL-MCNC: 1 MG/DL — SIGNIFICANT CHANGE UP (ref 0.2–1.2)
BILIRUB SERPL-MCNC: 1.1 MG/DL — SIGNIFICANT CHANGE UP (ref 0.2–1.2)
BLD GP AB SCN SERPL QL: NEGATIVE — SIGNIFICANT CHANGE UP
BUN SERPL-MCNC: 40 MG/DL — HIGH (ref 7–23)
BUN SERPL-MCNC: 52 MG/DL — HIGH (ref 7–23)
CALCIUM SERPL-MCNC: 8.1 MG/DL — LOW (ref 8.4–10.5)
CHLORIDE SERPL-SCNC: 100 MMOL/L — SIGNIFICANT CHANGE UP (ref 96–108)
CHLORIDE SERPL-SCNC: 103 MMOL/L — SIGNIFICANT CHANGE UP (ref 96–108)
CO2 BLDV-SCNC: 17 MMOL/L — LOW (ref 22–26)
CO2 BLDV-SCNC: 19 MMOL/L — LOW (ref 22–26)
CO2 BLDV-SCNC: 23 MMOL/L — SIGNIFICANT CHANGE UP (ref 22–26)
CO2 BLDV-SCNC: 25 MMOL/L — SIGNIFICANT CHANGE UP (ref 22–26)
CO2 SERPL-SCNC: 18 MMOL/L — LOW (ref 22–31)
CO2 SERPL-SCNC: 20 MMOL/L — LOW (ref 22–31)
CREAT SERPL-MCNC: 2.29 MG/DL — HIGH (ref 0.5–1.3)
CREAT SERPL-MCNC: 3.1 MG/DL — HIGH (ref 0.5–1.3)
EGFR: 22 ML/MIN/1.73M2 — LOW
EGFR: 31 ML/MIN/1.73M2 — LOW
GAS PNL BLDA: SIGNIFICANT CHANGE UP
GLUCOSE BLDC GLUCOMTR-MCNC: 107 MG/DL — HIGH (ref 70–99)
GLUCOSE BLDC GLUCOMTR-MCNC: 121 MG/DL — HIGH (ref 70–99)
GLUCOSE BLDC GLUCOMTR-MCNC: 123 MG/DL — HIGH (ref 70–99)
GLUCOSE BLDC GLUCOMTR-MCNC: 141 MG/DL — HIGH (ref 70–99)
GLUCOSE BLDC GLUCOMTR-MCNC: 145 MG/DL — HIGH (ref 70–99)
GLUCOSE BLDC GLUCOMTR-MCNC: 149 MG/DL — HIGH (ref 70–99)
GLUCOSE BLDC GLUCOMTR-MCNC: 150 MG/DL — HIGH (ref 70–99)
GLUCOSE BLDC GLUCOMTR-MCNC: 163 MG/DL — HIGH (ref 70–99)
GLUCOSE BLDC GLUCOMTR-MCNC: 167 MG/DL — HIGH (ref 70–99)
GLUCOSE BLDC GLUCOMTR-MCNC: 184 MG/DL — HIGH (ref 70–99)
GLUCOSE BLDC GLUCOMTR-MCNC: 188 MG/DL — HIGH (ref 70–99)
GLUCOSE BLDC GLUCOMTR-MCNC: 193 MG/DL — HIGH (ref 70–99)
GLUCOSE BLDC GLUCOMTR-MCNC: 194 MG/DL — HIGH (ref 70–99)
GLUCOSE BLDC GLUCOMTR-MCNC: 195 MG/DL — HIGH (ref 70–99)
GLUCOSE BLDC GLUCOMTR-MCNC: 206 MG/DL — HIGH (ref 70–99)
GLUCOSE BLDC GLUCOMTR-MCNC: 215 MG/DL — HIGH (ref 70–99)
GLUCOSE BLDC GLUCOMTR-MCNC: 256 MG/DL — HIGH (ref 70–99)
GLUCOSE BLDC GLUCOMTR-MCNC: 262 MG/DL — HIGH (ref 70–99)
GLUCOSE BLDC GLUCOMTR-MCNC: 272 MG/DL — HIGH (ref 70–99)
GLUCOSE BLDC GLUCOMTR-MCNC: 277 MG/DL — HIGH (ref 70–99)
GLUCOSE BLDC GLUCOMTR-MCNC: 76 MG/DL — SIGNIFICANT CHANGE UP (ref 70–99)
GLUCOSE SERPL-MCNC: 146 MG/DL — HIGH (ref 70–99)
GLUCOSE SERPL-MCNC: 252 MG/DL — HIGH (ref 70–99)
GRAM STN FLD: SIGNIFICANT CHANGE UP
HCO3 BLDV-SCNC: 16 MMOL/L — LOW (ref 22–29)
HCO3 BLDV-SCNC: 18 MMOL/L — LOW (ref 22–29)
HCO3 BLDV-SCNC: 22 MMOL/L — SIGNIFICANT CHANGE UP (ref 22–29)
HCO3 BLDV-SCNC: 24 MMOL/L — SIGNIFICANT CHANGE UP (ref 22–29)
HCT VFR BLD CALC: 25.7 % — LOW (ref 39–50)
HCT VFR BLD CALC: 27.1 % — LOW (ref 39–50)
HGB BLD-MCNC: 8.5 G/DL — LOW (ref 13–17)
HGB BLD-MCNC: 9 G/DL — LOW (ref 13–17)
HOROWITZ INDEX BLDV+IHG-RTO: 40 — SIGNIFICANT CHANGE UP
LIDOCAIN IGE QN: 296 U/L — HIGH (ref 7–60)
MAGNESIUM SERPL-MCNC: 3 MG/DL — HIGH (ref 1.6–2.6)
MCHC RBC-ENTMCNC: 28.8 PG — SIGNIFICANT CHANGE UP (ref 27–34)
MCHC RBC-ENTMCNC: 29.1 PG — SIGNIFICANT CHANGE UP (ref 27–34)
MCHC RBC-ENTMCNC: 33.1 GM/DL — SIGNIFICANT CHANGE UP (ref 32–36)
MCHC RBC-ENTMCNC: 33.2 GM/DL — SIGNIFICANT CHANGE UP (ref 32–36)
MCV RBC AUTO: 87.1 FL — SIGNIFICANT CHANGE UP (ref 80–100)
MCV RBC AUTO: 87.7 FL — SIGNIFICANT CHANGE UP (ref 80–100)
NRBC # BLD: 0 /100 WBCS — SIGNIFICANT CHANGE UP (ref 0–0)
PCO2 BLDV: 30 MMHG — LOW (ref 42–55)
PCO2 BLDV: 32 MMHG — LOW (ref 42–55)
PCO2 BLDV: 38 MMHG — LOW (ref 42–55)
PH BLDV: 7.34 — SIGNIFICANT CHANGE UP (ref 7.32–7.43)
PH BLDV: 7.35 — SIGNIFICANT CHANGE UP (ref 7.32–7.43)
PH BLDV: 7.36 — SIGNIFICANT CHANGE UP (ref 7.32–7.43)
PH BLDV: 7.41 — SIGNIFICANT CHANGE UP (ref 7.32–7.43)
PHOSPHATE SERPL-MCNC: 2.9 MG/DL — SIGNIFICANT CHANGE UP (ref 2.5–4.5)
PLATELET # BLD AUTO: 151 K/UL — SIGNIFICANT CHANGE UP (ref 150–400)
PLATELET # BLD AUTO: 211 K/UL — SIGNIFICANT CHANGE UP (ref 150–400)
PO2 BLDV: 34 MMHG — SIGNIFICANT CHANGE UP (ref 25–45)
PO2 BLDV: 41 MMHG — SIGNIFICANT CHANGE UP (ref 25–45)
PO2 BLDV: 43 MMHG — SIGNIFICANT CHANGE UP (ref 25–45)
PO2 BLDV: 44 MMHG — SIGNIFICANT CHANGE UP (ref 25–45)
POTASSIUM SERPL-MCNC: 4.7 MMOL/L — SIGNIFICANT CHANGE UP (ref 3.5–5.3)
POTASSIUM SERPL-SCNC: 4.7 MMOL/L — SIGNIFICANT CHANGE UP (ref 3.5–5.3)
PROCALCITONIN SERPL-MCNC: 1.4 NG/ML — HIGH (ref 0.02–0.1)
PROT SERPL-MCNC: 6 G/DL — SIGNIFICANT CHANGE UP (ref 6–8.3)
PROT SERPL-MCNC: 6.2 G/DL — SIGNIFICANT CHANGE UP (ref 6–8.3)
RBC # BLD: 2.95 M/UL — LOW (ref 4.2–5.8)
RBC # BLD: 3.09 M/UL — LOW (ref 4.2–5.8)
RBC # FLD: 14.6 % — HIGH (ref 10.3–14.5)
RBC # FLD: 15 % — HIGH (ref 10.3–14.5)
RH IG SCN BLD-IMP: POSITIVE — SIGNIFICANT CHANGE UP
SAO2 % BLDV: 63.6 % — LOW (ref 67–88)
SAO2 % BLDV: 70.3 % — SIGNIFICANT CHANGE UP (ref 67–88)
SAO2 % BLDV: 73 % — SIGNIFICANT CHANGE UP (ref 67–88)
SAO2 % BLDV: 77.6 % — SIGNIFICANT CHANGE UP (ref 67–88)
SODIUM SERPL-SCNC: 133 MMOL/L — LOW (ref 135–145)
SODIUM SERPL-SCNC: 137 MMOL/L — SIGNIFICANT CHANGE UP (ref 135–145)
SPECIMEN SOURCE: SIGNIFICANT CHANGE UP
SRA INTERP SER-IMP: SIGNIFICANT CHANGE UP
WBC # BLD: 13.43 K/UL — HIGH (ref 3.8–10.5)
WBC # BLD: 22.65 K/UL — HIGH (ref 3.8–10.5)
WBC # FLD AUTO: 13.43 K/UL — HIGH (ref 3.8–10.5)
WBC # FLD AUTO: 22.65 K/UL — HIGH (ref 3.8–10.5)

## 2022-12-09 PROCEDURE — 99233 SBSQ HOSP IP/OBS HIGH 50: CPT | Mod: GC

## 2022-12-09 PROCEDURE — 93010 ELECTROCARDIOGRAM REPORT: CPT

## 2022-12-09 PROCEDURE — 99291 CRITICAL CARE FIRST HOUR: CPT

## 2022-12-09 PROCEDURE — 99292 CRITICAL CARE ADDL 30 MIN: CPT

## 2022-12-09 PROCEDURE — 99232 SBSQ HOSP IP/OBS MODERATE 35: CPT

## 2022-12-09 PROCEDURE — 71045 X-RAY EXAM CHEST 1 VIEW: CPT | Mod: 26

## 2022-12-09 RX ORDER — BUMETANIDE 0.25 MG/ML
4 INJECTION INTRAMUSCULAR; INTRAVENOUS ONCE
Refills: 0 | Status: COMPLETED | OUTPATIENT
Start: 2022-12-09 | End: 2022-12-09

## 2022-12-09 RX ORDER — VANCOMYCIN HCL 1 G
1000 VIAL (EA) INTRAVENOUS ONCE
Refills: 0 | Status: COMPLETED | OUTPATIENT
Start: 2022-12-09 | End: 2022-12-09

## 2022-12-09 RX ORDER — HYDROMORPHONE HYDROCHLORIDE 2 MG/ML
0.5 INJECTION INTRAMUSCULAR; INTRAVENOUS; SUBCUTANEOUS ONCE
Refills: 0 | Status: DISCONTINUED | OUTPATIENT
Start: 2022-12-09 | End: 2022-12-09

## 2022-12-09 RX ORDER — MILRINONE LACTATE 1 MG/ML
0.1 INJECTION, SOLUTION INTRAVENOUS
Qty: 20 | Refills: 0 | Status: DISCONTINUED | OUTPATIENT
Start: 2022-12-09 | End: 2022-12-16

## 2022-12-09 RX ORDER — SODIUM NITROPRUSSIDE 50 MG/2ML
0.3 INJECTION INTRAVENOUS
Qty: 100 | Refills: 0 | Status: DISCONTINUED | OUTPATIENT
Start: 2022-12-09 | End: 2022-12-09

## 2022-12-09 RX ORDER — ARGATROBAN 50 MG/50ML
0.3 INJECTION, SOLUTION INTRAVENOUS
Qty: 50 | Refills: 0 | Status: DISCONTINUED | OUTPATIENT
Start: 2022-12-09 | End: 2022-12-11

## 2022-12-09 RX ORDER — CALCIUM GLUCONATE 100 MG/ML
1 VIAL (ML) INTRAVENOUS ONCE
Refills: 0 | Status: COMPLETED | OUTPATIENT
Start: 2022-12-09 | End: 2022-12-09

## 2022-12-09 RX ORDER — POTASSIUM CHLORIDE 20 MEQ
10 PACKET (EA) ORAL ONCE
Refills: 0 | Status: COMPLETED | OUTPATIENT
Start: 2022-12-09 | End: 2022-12-09

## 2022-12-09 RX ORDER — AMIODARONE HYDROCHLORIDE 400 MG/1
150 TABLET ORAL ONCE
Refills: 0 | Status: COMPLETED | OUTPATIENT
Start: 2022-12-09 | End: 2022-12-09

## 2022-12-09 RX ORDER — VASOPRESSIN 20 [USP'U]/ML
0.03 INJECTION INTRAVENOUS
Qty: 40 | Refills: 0 | Status: DISCONTINUED | OUTPATIENT
Start: 2022-12-09 | End: 2022-12-12

## 2022-12-09 RX ADMIN — INSULIN HUMAN 3 UNIT(S)/HR: 100 INJECTION, SOLUTION SUBCUTANEOUS at 07:29

## 2022-12-09 RX ADMIN — DEXMEDETOMIDINE HYDROCHLORIDE IN 0.9% SODIUM CHLORIDE 23.2 MICROGRAM(S)/KG/HR: 4 INJECTION INTRAVENOUS at 03:09

## 2022-12-09 RX ADMIN — SODIUM CHLORIDE 10 MILLILITER(S): 9 INJECTION INTRAMUSCULAR; INTRAVENOUS; SUBCUTANEOUS at 07:29

## 2022-12-09 RX ADMIN — Medication 250 MICROGRAM(S): at 01:16

## 2022-12-09 RX ADMIN — AMIODARONE HYDROCHLORIDE 16.7 MG/MIN: 400 TABLET ORAL at 07:28

## 2022-12-09 RX ADMIN — ATORVASTATIN CALCIUM 40 MILLIGRAM(S): 80 TABLET, FILM COATED ORAL at 21:32

## 2022-12-09 RX ADMIN — POLYETHYLENE GLYCOL 3350 17 GRAM(S): 17 POWDER, FOR SOLUTION ORAL at 17:11

## 2022-12-09 RX ADMIN — ARGATROBAN 0.78 MICROGRAM(S)/KG/MIN: 50 INJECTION, SOLUTION INTRAVENOUS at 02:40

## 2022-12-09 RX ADMIN — Medication 100 MILLIGRAM(S): at 08:02

## 2022-12-09 RX ADMIN — VASOPRESSIN 7.5 UNIT(S)/MIN: 20 INJECTION INTRAVENOUS at 02:39

## 2022-12-09 RX ADMIN — DEXMEDETOMIDINE HYDROCHLORIDE IN 0.9% SODIUM CHLORIDE 23.2 MICROGRAM(S)/KG/HR: 4 INJECTION INTRAVENOUS at 07:28

## 2022-12-09 RX ADMIN — PANTOPRAZOLE SODIUM 40 MILLIGRAM(S): 20 TABLET, DELAYED RELEASE ORAL at 17:56

## 2022-12-09 RX ADMIN — ARGATROBAN 0.78 MICROGRAM(S)/KG/MIN: 50 INJECTION, SOLUTION INTRAVENOUS at 07:28

## 2022-12-09 RX ADMIN — CHLORHEXIDINE GLUCONATE 1 APPLICATION(S): 213 SOLUTION TOPICAL at 05:09

## 2022-12-09 RX ADMIN — Medication 50 MILLIEQUIVALENT(S): at 10:10

## 2022-12-09 RX ADMIN — AMIODARONE HYDROCHLORIDE 600 MILLIGRAM(S): 400 TABLET ORAL at 09:58

## 2022-12-09 RX ADMIN — CHLORHEXIDINE GLUCONATE 15 MILLILITER(S): 213 SOLUTION TOPICAL at 17:11

## 2022-12-09 RX ADMIN — SENNA PLUS 2 TABLET(S): 8.6 TABLET ORAL at 21:33

## 2022-12-09 RX ADMIN — Medication 100 GRAM(S): at 12:49

## 2022-12-09 RX ADMIN — PANTOPRAZOLE SODIUM 40 MILLIGRAM(S): 20 TABLET, DELAYED RELEASE ORAL at 05:09

## 2022-12-09 RX ADMIN — BUMETANIDE 132 MILLIGRAM(S): 0.25 INJECTION INTRAMUSCULAR; INTRAVENOUS at 01:58

## 2022-12-09 RX ADMIN — Medication 250 MILLIGRAM(S): at 11:02

## 2022-12-09 RX ADMIN — INSULIN HUMAN 3 UNIT(S)/HR: 100 INJECTION, SOLUTION SUBCUTANEOUS at 03:08

## 2022-12-09 RX ADMIN — AMIODARONE HYDROCHLORIDE 16.7 MG/MIN: 400 TABLET ORAL at 03:09

## 2022-12-09 NOTE — PROGRESS NOTE ADULT - ATTENDING COMMENTS
s/p ECMO decannulation yesterday  IABP 1:1  milrinone started this AM  CVP 10  goal net neg 1 lits  Will plan for IABP wean tomorrow    A. flutter vs Atrial tachycardia  s/p amio rebolus, cont on amio drip  EP consult    Resp failure s/p intubation  on CPAP

## 2022-12-09 NOTE — PROGRESS NOTE ADULT - ASSESSMENT
62 year old admitted with pancreatitis  not necrotizing on the CT  scan and cholecystitis .  pt has sent to the SICU but developed respiratory  failure presumed to be due to ARDS also with hs of CAD<CABG<DM and possible MI  Moved to the CTU for ECMO for cardiogenic shock vs ARDS      Fever  not on CAVHD but now developing renal failure   cultures are negative and ct looks like ARDS.  Fever last night for the first time.   legs are viable Yesterday had his ECMO removed and replaced with an IABP.    ARDS  likely all problems related to pancreatitis plus cardiogenic.   Completed empiric course of meropenem on 12/5   and has been off ab since then      .    Reculture blood and give stat dose of vanco in his of  recent manipulations and placement of IABP.    CHF  off  ecmo in IABP   weaning per cardiac team

## 2022-12-09 NOTE — PROGRESS NOTE ADULT - PROBLEM SELECTOR PLAN 2
- CT abd showing acute pancreatitis  - RUQ showing sludge, no stones  - lipase > 3000 11/24, normalized prior and now fluctuating  - conservative care  - appreciate GI recs, no intervention planned   - surgery following   - appreciate ID recs.

## 2022-12-09 NOTE — PROGRESS NOTE ADULT - PROBLEM SELECTOR PLAN 8
- f/u ID recs, multiple potential sources given recent lines/procedures, as well as possible biliary source  - consider removing non-vital lines

## 2022-12-09 NOTE — PROGRESS NOTE ADULT - SUBJECTIVE AND OBJECTIVE BOX
[facility] DIVISION OF KIDNEY DISEASES AND HYPERTENSION   FOLLOW UP NOTE    --------------------------------------------------------------------------------  HPI:  61 y/o male with PMH of CABG, DM, HTN, HLD presenting as transfer from [place] for c/f STEMI. Course c/b gallstone pancreatitis leading to ARDS and shock & cardiac arrest now on [facility] ECMO. Had significant troponin elevation and his LVEF down to 8%. Pt was deemed to be too unstable to have an angiogram and potential PCI due to his co-morbidities & a new subdural bleed. Pt being seen for ERIC. SCr on arrival was 2.15; trended down to hector 1.6 on 11/25 and eventually increased to 3.95 11/28. Pt received IV diuretics as per CTU.    Patient seen & examined today. Pt is intubated, unable to obtain ROS. Pt initiated on CRRT on 12/5/22 to optimize volume status and electrolytes. Pt tolerating CRRT overnight with target net negative fluid balance. Pt underwent dennaculation of ECMO on 12/8/22. Pt was restarted overnight on 12/9/22 for volume overload.      PAST HISTORY  --------------------------------------------------------------------------------  No significant changes to PMH, PSH, FHx, SHx, unless otherwise noted    ALLERGIES & MEDICATIONS  --------------------------------------------------------------------------------  Allergies    No Known Allergies    Intolerances      Standing Inpatient Medications  aMIOdarone Infusion 0.5 mG/Min IV Continuous <Continuous>  argatroban Infusion 0.16 MICROgram(s)/kG/Min IV Continuous <Continuous>  atorvastatin 40 milliGRAM(s) Oral at bedtime  cefuroxime  IVPB 1500 milliGRAM(s) IV Intermittent every 24 hours  chlorhexidine 0.12% Liquid 15 milliLiter(s) Oral Mucosa every 12 hours  chlorhexidine 2% Cloths 1 Application(s) Topical <User Schedule>  CRRT Treatment    <Continuous>  dexMEDEtomidine Infusion 1 MICROgram(s)/kG/Hr IV Continuous <Continuous>  dextrose 50% Injectable 25 milliLiter(s) IV Push every 15 minutes  dextrose 50% Injectable 50 milliLiter(s) IV Push every 15 minutes  insulin regular Infusion 3 Unit(s)/Hr IV Continuous <Continuous>  milrinone Infusion 0.1 MICROgram(s)/kG/Min IV Continuous <Continuous>  pantoprazole  Injectable 40 milliGRAM(s) IV Push two times a day  Phoxillum Filtration BK 4 / 2.5 5000 milliLiter(s) CRRT <Continuous>  polyethylene glycol 3350 17 Gram(s) Oral two times a day  PrismaSOL Filtration BGK 4 / 2.5 5000 milliLiter(s) CRRT <Continuous>  PrismaSOL Filtration BGK 4 / 2.5 5000 milliLiter(s) CRRT <Continuous>  senna 2 Tablet(s) Oral at bedtime  sodium chloride 0.9%. 1000 milliLiter(s) IV Continuous <Continuous>  vancomycin  IVPB 1000 milliGRAM(s) IV Intermittent once    PRN Inpatient Medications      REVIEW OF SYSTEMS  --------------------------------------------------------------------------------  Unable to obtain    VITALS/PHYSICAL EXAM  --------------------------------------------------------------------------------  T(C): 37 (12-09-22 @ 08:00), Max: 38.1 (12-08-22 @ 18:00)  HR: 114 (12-09-22 @ 09:00) (76 - 134)  BP: --  RR: 25 (12-09-22 @ 09:00) (1 - 25)  SpO2: 100% (12-09-22 @ 09:00) (95% - 100%)  Wt(kg): --  Height (cm): 172.7 (12-08-22 @ 07:40)  Weight (kg): 92.9 (12-08-22 @ 07:40)  BMI (kg/m2): 31.1 (12-08-22 @ 07:40)  BSA (m2): 2.06 (12-08-22 @ 07:40)      12-08-22 @ 07:01  -  12-09-22 @ 07:00  --------------------------------------------------------  IN: 2516.9 mL / OUT: 929 mL / NET: 1587.9 mL    12-09-22 @ 07:01  -  12-09-22 @ 09:10  --------------------------------------------------------  IN: 113.2 mL / OUT: 351 mL / NET: -237.8 mL      Physical Exam:  	Gen: ill appearing male intubated   	HEENT: Anicteric  	Pulm: on CMV via ETT+  	CV: S1S2+  	Abd: Soft, +BS       	Ext: LE edema B/L++  	Neuro: awake and nods spontaneously  	Skin: Warm and dry              Dialysis acces: right non tunneled HD catheter    LABS/STUDIES  --------------------------------------------------------------------------------              8.5    13.43 >-----------<  151      [12-09-22 @ 00:19]              25.7     137  |  103  |  52  ----------------------------<  146      [12-09-22 @ 00:19]  4.7   |  20  |  3.10        Ca     8.1     [12-09-22 @ 00:19]      Mg     3.0     [12-09-22 @ 00:19]      Phos  2.9     [12-09-22 @ 00:19]    Creatinine Trend:  SCr 3.10 [12-09 @ 00:19]  SCr 1.89 [12-08 @ 10:58]  SCr 1.66 [12-08 @ 00:36]  SCr 1.91 [12-07 @ 01:32]  SCr 2.28 [12-06 @ 15:25]    Urinalysis - [12-03-22 @ 14:45]      Color BROWN / Appearance Slightly Turbid / SG 1.015 / pH 5.5      Gluc Negative / Ketone Negative  / Bili Negative / Urobili 6 mg/dL       Blood Moderate / Protein 30 mg/dL / Leuk Est Negative / Nitrite Negative      RBC 43 / WBC 2 / Hyaline 2 / Gran  / Sq Epi  / Non Sq Epi 1 / Bacteria Negative    Urine Osmolality 426      [12-03-22 @ 10:37]     [facility] DIVISION OF KIDNEY DISEASES AND HYPERTENSION   FOLLOW UP NOTE    --------------------------------------------------------------------------------  HPI:  63 y/o male with PMH of CABG, DM, HTN, HLD presenting as transfer from [place] for c/f STEMI. Course c/b gallstone pancreatitis leading to ARDS and shock & cardiac arrest now on [facility] ECMO. Had significant troponin elevation and his LVEF down to 8%. Pt was deemed to be too unstable to have an angiogram and potential PCI due to his co-morbidities & a new subdural bleed. Pt being seen for ERIC. SCr on arrival was 2.15; trended down to hector 1.6 on 11/25 and eventually increased to 3.95 11/28. Pt received IV diuretics as per CTU.    Patient seen & examined today. Pt is intubated, unable to obtain ROS. Pt initiated on CRRT on 12/5/22 to optimize volume status and electrolytes. Pt tolerating CRRT overnight with target net negative fluid balance. Pt underwent dennaculation of ECMO on 12/8/22. Pt was restarted overnight on 12/9/22 for volume overload.      PAST HISTORY  --------------------------------------------------------------------------------  No significant changes to PMH, PSH, FHx, SHx, unless otherwise noted    ALLERGIES & MEDICATIONS  --------------------------------------------------------------------------------  Allergies    No Known Allergies    Intolerances      Standing Inpatient Medications  aMIOdarone Infusion 0.5 mG/Min IV Continuous <Continuous>  argatroban Infusion 0.16 MICROgram(s)/kG/Min IV Continuous <Continuous>  atorvastatin 40 milliGRAM(s) Oral at bedtime  cefuroxime  IVPB 1500 milliGRAM(s) IV Intermittent every 24 hours  chlorhexidine 0.12% Liquid 15 milliLiter(s) Oral Mucosa every 12 hours  chlorhexidine 2% Cloths 1 Application(s) Topical <User Schedule>  CRRT Treatment    <Continuous>  dexMEDEtomidine Infusion 1 MICROgram(s)/kG/Hr IV Continuous <Continuous>  dextrose 50% Injectable 25 milliLiter(s) IV Push every 15 minutes  dextrose 50% Injectable 50 milliLiter(s) IV Push every 15 minutes  insulin regular Infusion 3 Unit(s)/Hr IV Continuous <Continuous>  milrinone Infusion 0.1 MICROgram(s)/kG/Min IV Continuous <Continuous>  pantoprazole  Injectable 40 milliGRAM(s) IV Push two times a day  Phoxillum Filtration BK 4 / 2.5 5000 milliLiter(s) CRRT <Continuous>  polyethylene glycol 3350 17 Gram(s) Oral two times a day  PrismaSOL Filtration BGK 4 / 2.5 5000 milliLiter(s) CRRT <Continuous>  PrismaSOL Filtration BGK 4 / 2.5 5000 milliLiter(s) CRRT <Continuous>  senna 2 Tablet(s) Oral at bedtime  sodium chloride 0.9%. 1000 milliLiter(s) IV Continuous <Continuous>  vancomycin  IVPB 1000 milliGRAM(s) IV Intermittent once    PRN Inpatient Medications      REVIEW OF SYSTEMS  --------------------------------------------------------------------------------  Unable to obtain    VITALS/PHYSICAL EXAM  --------------------------------------------------------------------------------  T(C): 37 (12-09-22 @ 08:00), Max: 38.1 (12-08-22 @ 18:00)  HR: 114 (12-09-22 @ 09:00) (76 - 134)  BP: --  RR: 25 (12-09-22 @ 09:00) (1 - 25)  SpO2: 100% (12-09-22 @ 09:00) (95% - 100%)  Wt(kg): --  Height (cm): 172.7 (12-08-22 @ 07:40)  Weight (kg): 92.9 (12-08-22 @ 07:40)  BMI (kg/m2): 31.1 (12-08-22 @ 07:40)  BSA (m2): 2.06 (12-08-22 @ 07:40)      12-08-22 @ 07:01  -  12-09-22 @ 07:00  --------------------------------------------------------  IN: 2516.9 mL / OUT: 929 mL / NET: 1587.9 mL    12-09-22 @ 07:01  -  12-09-22 @ 09:10  --------------------------------------------------------  IN: 113.2 mL / OUT: 351 mL / NET: -237.8 mL      Physical Exam:  	Gen: ill appearing male intubated   	HEENT: Anicteric  	Pulm: on CMV via ETT+  	CV: S1S2+  	Abd: Soft, +BS       	Ext: LE edema B/L++  	Neuro: awake and nods spontaneously  	Skin: Warm and dry              Dialysis acces: right non tunneled HD catheter    LABS/STUDIES  --------------------------------------------------------------------------------              8.5    13.43 >-----------<  151      [12-09-22 @ 00:19]              25.7     137  |  103  |  52  ----------------------------<  146      [12-09-22 @ 00:19]  4.7   |  20  |  3.10        Ca     8.1     [12-09-22 @ 00:19]      Mg     3.0     [12-09-22 @ 00:19]      Phos  2.9     [12-09-22 @ 00:19]    Creatinine Trend:  SCr 3.10 [12-09 @ 00:19]  SCr 1.89 [12-08 @ 10:58]  SCr 1.66 [12-08 @ 00:36]  SCr 1.91 [12-07 @ 01:32]  SCr 2.28 [12-06 @ 15:25]    Urinalysis - [12-03-22 @ 14:45]      Color BROWN / Appearance Slightly Turbid / SG 1.015 / pH 5.5      Gluc Negative / Ketone Negative  / Bili Negative / Urobili 6 mg/dL       Blood Moderate / Protein 30 mg/dL / Leuk Est Negative / Nitrite Negative      RBC 43 / WBC 2 / Hyaline 2 / Gran  / Sq Epi  / Non Sq Epi 1 / Bacteria Negative    Urine Osmolality 426      [12-03-22 @ 10:37]     [facility] DIVISION OF KIDNEY DISEASES AND HYPERTENSION   FOLLOW UP NOTE    --------------------------------------------------------------------------------  HPI:  61 y/o male with PMH of CABG, DM, HTN, HLD presenting as transfer from [place] for c/f STEMI. Course c/b gallstone pancreatitis leading to ARDS and shock & cardiac arrest now on [facility] ECMO. Had significant troponin elevation and his LVEF down to 8%. Pt was deemed to be too unstable to have an angiogram and potential PCI due to his co-morbidities & a new subdural bleed. Pt being seen for ERIC. SCr on arrival was 2.15; trended down to hector 1.6 on 11/25 and eventually increased to 3.95 11/28. Pt received IV diuretics as per CTU.    Patient seen & examined today. Pt is intubated, unable to obtain ROS. Pt initiated on CRRT on 12/5/22 to optimize volume status and electrolytes. Pt tolerating CRRT overnight with target net negative fluid balance. Pt underwent dennaculation of ECMO on 12/8/22. Pt was restarted overnight on 12/9/22 for volume overload.      PAST HISTORY  --------------------------------------------------------------------------------  No significant changes to PMH, PSH, FHx, SHx, unless otherwise noted    ALLERGIES & MEDICATIONS  --------------------------------------------------------------------------------  Allergies    No Known Allergies    Intolerances      Standing Inpatient Medications  aMIOdarone Infusion 0.5 mG/Min IV Continuous <Continuous>  argatroban Infusion 0.16 MICROgram(s)/kG/Min IV Continuous <Continuous>  atorvastatin 40 milliGRAM(s) Oral at bedtime  cefuroxime  IVPB 1500 milliGRAM(s) IV Intermittent every 24 hours  chlorhexidine 0.12% Liquid 15 milliLiter(s) Oral Mucosa every 12 hours  chlorhexidine 2% Cloths 1 Application(s) Topical <User Schedule>  CRRT Treatment    <Continuous>  dexMEDEtomidine Infusion 1 MICROgram(s)/kG/Hr IV Continuous <Continuous>  dextrose 50% Injectable 25 milliLiter(s) IV Push every 15 minutes  dextrose 50% Injectable 50 milliLiter(s) IV Push every 15 minutes  insulin regular Infusion 3 Unit(s)/Hr IV Continuous <Continuous>  milrinone Infusion 0.1 MICROgram(s)/kG/Min IV Continuous <Continuous>  pantoprazole  Injectable 40 milliGRAM(s) IV Push two times a day  Phoxillum Filtration BK 4 / 2.5 5000 milliLiter(s) CRRT <Continuous>  polyethylene glycol 3350 17 Gram(s) Oral two times a day  PrismaSOL Filtration BGK 4 / 2.5 5000 milliLiter(s) CRRT <Continuous>  PrismaSOL Filtration BGK 4 / 2.5 5000 milliLiter(s) CRRT <Continuous>  senna 2 Tablet(s) Oral at bedtime  sodium chloride 0.9%. 1000 milliLiter(s) IV Continuous <Continuous>  vancomycin  IVPB 1000 milliGRAM(s) IV Intermittent once    PRN Inpatient Medications      REVIEW OF SYSTEMS  --------------------------------------------------------------------------------  Unable to obtain    VITALS/PHYSICAL EXAM  --------------------------------------------------------------------------------  T(C): 37 (12-09-22 @ 08:00), Max: 38.1 (12-08-22 @ 18:00)  HR: 114 (12-09-22 @ 09:00) (76 - 134)  BP: --  RR: 25 (12-09-22 @ 09:00) (1 - 25)  SpO2: 100% (12-09-22 @ 09:00) (95% - 100%)  Wt(kg): --  Height (cm): 172.7 (12-08-22 @ 07:40)  Weight (kg): 92.9 (12-08-22 @ 07:40)  BMI (kg/m2): 31.1 (12-08-22 @ 07:40)  BSA (m2): 2.06 (12-08-22 @ 07:40)      12-08-22 @ 07:01  -  12-09-22 @ 07:00  --------------------------------------------------------  IN: 2516.9 mL / OUT: 929 mL / NET: 1587.9 mL    12-09-22 @ 07:01  -  12-09-22 @ 09:10  --------------------------------------------------------  IN: 113.2 mL / OUT: 351 mL / NET: -237.8 mL      Physical Exam:  	Gen: ill appearing male intubated   	HEENT: Anicteric  	Pulm: on CMV via ETT+  	CV: S1S2+  	Abd: Soft, +BS       	Ext: LE edema B/L++  	Neuro: awake and nods spontaneously  	Skin: Warm and dry              Dialysis acces: right non tunneled HD catheter    LABS/STUDIES  --------------------------------------------------------------------------------              8.5    13.43 >-----------<  151      [12-09-22 @ 00:19]              25.7     137  |  103  |  52  ----------------------------<  146      [12-09-22 @ 00:19]  4.7   |  20  |  3.10        Ca     8.1     [12-09-22 @ 00:19]      Mg     3.0     [12-09-22 @ 00:19]      Phos  2.9     [12-09-22 @ 00:19]    Creatinine Trend:  SCr 3.10 [12-09 @ 00:19]  SCr 1.89 [12-08 @ 10:58]  SCr 1.66 [12-08 @ 00:36]  SCr 1.91 [12-07 @ 01:32]  SCr 2.28 [12-06 @ 15:25]    Urinalysis - [12-03-22 @ 14:45]      Color BROWN / Appearance Slightly Turbid / SG 1.015 / pH 5.5      Gluc Negative / Ketone Negative  / Bili Negative / Urobili 6 mg/dL       Blood Moderate / Protein 30 mg/dL / Leuk Est Negative / Nitrite Negative      RBC 43 / WBC 2 / Hyaline 2 / Gran  / Sq Epi  / Non Sq Epi 1 / Bacteria Negative    Urine Osmolality 426      [12-03-22 @ 10:37]

## 2022-12-09 NOTE — PROGRESS NOTE ADULT - PROBLEM SELECTOR PLAN 5
- small 3mm subdural hemorrhage stable on repeat CTs  - okay to continue argatroban, appreciate neuro recs  - keep MAP < 75 mmHg and careful monitoring of PTT.

## 2022-12-09 NOTE — PROGRESS NOTE ADULT - PROBLEM SELECTOR PLAN 3
- likely arrest associated ATN and now worsened from hypovolemia   - stop diuretics and aim to keep net even to positive   - renal following, c/t CVVH. - likely arrest associated ATN and now worsened from hypovolemia   - renal following, c/t CVVH.

## 2022-12-09 NOTE — PROGRESS NOTE ADULT - PROBLEM SELECTOR PLAN 7
Detail Level: Detailed X Size Of Lesion In Cm (Optional): 0 - HR stable at 124 upon exam, appears to be flutter/atach 2:1  - amio bolus + c/t gtt  - consider DCCV - HR stable at 124 upon exam, appears to be flutter/atach 2:1  - amio bolus + c/t gtt  - consider DCCV, would get EP involved

## 2022-12-09 NOTE — PROGRESS NOTE ADULT - ASSESSMENT
61 YO M with a history of CAD s/p 4v CABG ~2019 in [facility], DM2 (A1c 7.3%), and HTN who initially presented to OSH with abdominal pain and n/v and found to have pancreatitis with lipase > 3000 though also with elevated troponins. CT abdomen revealed acute pancreatitis and his initial LVEF was 40-45%. He developed MSOF with hypoxic respiratory failure requiring intubation and ERIC and went into hypoxic PEA arrest requiring ACLS and due to persistent hypoxia and hypotension on pressors after ROSC was cannulated to [facility] ECMO (LFV, RFA, no anterograde perfusion catheter) on 11/25. Notably CTH that day revealed small subdural hemorrhage.     His post arrest TTE on 11/26 showed a signficantly worse LVEF of 5-10% with an AV that was only minimally opening. Since then he has had improvement in his LV function (now ~35-40%) and improved pulsatility while tolerating progressive slow ECMO weans. ECMO turndown (note in chart 11/30) was reassuring for ability to decannulate to IABP/inotropes. LHC 12/06 showed closure of his 3 vein grafts with graft to LAD patent though with distal native disease. IABP placed, ECMO successful and decannulated 12/08 (transfused 2u pRBCs during). His ARDS is improving. Currently on CVVH.    Previous cardiac studies:  LHC (12/06/22) - patent LIMA-LAD, RCA , LCx , aortogram w/ closure of 3 vein grafts, LVEDP 22 mm Hg, left-to-left and left-to-right collaterals  TTE (12/03/22) - mod-to-sev segmental LVSD (inferolateral, inferior, inferoseptal hypokinesis)    Hemodynamics:  12/08 IABP 1:1, aMAP 120, aMAP 87, CVP 5  12/07 IABP 1:1 ECMO 3600 RPM 4.0 LPM, aMAP 90, aDBP 123, mVO2 66.8%, CVP 7, CO 6.8, CI 3.3    *** INCOMPLETE NOTE *** INCOMPLETE NOTE *** INCOMPLETE NOTE *** INCOMPLETE NOTE *** INCOMPLETE NOTE *** INCOMPLETE NOTE *** INCOMPLETE NOTE *** INCOMPLETE NOTE *** INCOMPLETE NOTE *** INCOMPLETE NOTE *** INCOMPLETE NOTE *** INCOMPLETE NOTE *** INCOMPLETE NOTE *** INCOMPLETE NOTE *** INCOMPLETE NOTE *** INCOMPLETE NOTE *** INCOMPLETE NOTE *** INCOMPLETE NOTE *** INCOMPLETE NOTE *** INCOMPLETE NOTE ***  RECS BELOW ARE NOT TO BE FOLLOWED UNTIL FINALIZED  amio gtt, argat gtt, atorva 40, dexmed gtt, insulin gtt, CRRT, cefurox, CRRT    Problem/Plan - 1:  ·  Problem: Non-ST elevation MI (NSTEMI).   ·  Plan:   - troponin peaked at > 73672   - [facility] 12/06 with IABP placement revealed that 3 grafts are closed w/ distal native disease from remaining LAD graft  - continue statin  - would start ASA when okay with surgical team.    Problem/Plan - 2:  ·  Problem: Gall stone pancreatitis.   ·  Plan:   - CT abd showing acute pancreatitis  - RUQ showing sludge, no stones  - lipase > 3000 11/24, normalized prior and now fluctuating  - conservative care  - appreciate GI recs, no intervention planned   - surgery following   - off of aBx, appreciate ID recs.    Problem/Plan - 3:  ·  Problem: ERIC (acute kidney injury).   ·  Plan:   - likely arrest associated ATN and now worsened from hypovolemia   - stop diuretics and aim to keep net even to positive   - renal following, c/t CVVH.    Problem/Plan - 4:  ·  Problem: Cardiogenic shock.   ·  Plan:   - ECMO decannulated 12/08 after successful wean  - IABP in place, keep 1:1, target augmented MAP 70s  - remains pulsatile  - cont argatroban for AC while on IABP  - will titrate GDMT as appropriate if above goal off pressors.    Problem/Plan - 5:  ·  Problem: Nontraumatic subdural hematoma.   ·  Plan:   - small 3mm subdural hemorrhage stable on repeat CTs  - okay to continue argatroban, appreciate neuro recs  - keep MAP < 75 mmHg and careful monitoring of PTT.    Problem/Plan - 6:  ·  Problem: Acute respiratory failure with hypoxia.   ·  Plan:   - CXR improved with more lung volumes after bronch and increased PEEP  - bronch showed erythematous airways with scant bleeding  - extubate when able  - continue checking right radial ABG.  - continue treatment for pancreatitis related ARDS  - caution with propofol gtt given pancreatis, would follow lipids.    Underwent decannulation today but noted to have mod-severe MR. Had aflutter afterwards s/p conversion to sinus. On exam, NAD, JVP approx 10-12 cm w/ HJR, RRR, CTA anterior lung fields, abdomen soft with bowel sounds, 1+ edema, pulses intact. Labs reviewed - WBC 17.8 (from 13), Na 136 (improved), BUN/Cr 34/1.89 (on HD), albumin 3.0, amylase/lipase stably elevated (improved from prior). LHC 12/6 revealed patent GUTIÉRREZ to LAD,  of RCA/LCx with L to L and L to R collaterals; aortogram revealed no filling of any other grafts.   - would consider resuming CVVH; goal net negative  - c/w IABP and will attempt wean with inotropic support if necessary  - c/w anticoagulation  - on statin  - prognosis guarded; family meeting held 12/6 and discussed at length current state and next steps with plan to f/u next week .  61 YO M with a history of CAD s/p 4v CABG ~2019 in [facility], DM2 (A1c 7.3%), and HTN who initially presented to OSH with abdominal pain and n/v and found to have pancreatitis with lipase > 3000 though also with elevated troponins. CT abdomen revealed acute pancreatitis and his initial LVEF was 40-45%. He developed MSOF with hypoxic respiratory failure requiring intubation and ERIC and went into hypoxic PEA arrest requiring ACLS and due to persistent hypoxia and hypotension on pressors after ROSC was cannulated to VA ECMO (LFV, RFA, no anterograde perfusion catheter) on 11/25. Notably CTH that day revealed small subdural hemorrhage.     His post arrest TTE on 11/26 showed a signficantly worse LVEF of 5-10% with an AV that was only minimally opening. Since then he has had improvement in his LV function (now ~35-40%) and improved pulsatility while tolerating progressive slow ECMO weans. ECMO turndown (note in chart 11/30) was reassuring for ability to decannulate to IABP/inotropes. LHC 12/06 showed closure of his 3 vein grafts with graft to LAD patent though with distal native disease. IABP placed, ECMO successful and decannulated 12/08 (transfused 2u pRBCs during). His ARDS is improving. Currently on CVVH.    Previous cardiac studies:  LHC (12/06/22) - patent LIMA-LAD, RCA , LCx , aortogram w/ closure of 3 vein grafts, LVEDP 22 mm Hg, left-to-left and left-to-right collaterals  TTE (12/03/22) - mod-to-sev segmental LVSD (inferolateral, inferior, inferoseptal hypokinesis)    Hemodynamics:  12/08 IABP 1:1, aMAP 120, aMAP 87, CVP 5  12/07 IABP 1:1 ECMO 3600 RPM 4.0 LPM, aMAP 90, aDBP 123, mVO2 66.8%, CVP 7, CO 6.8, CI 3.3    *** INCOMPLETE NOTE *** INCOMPLETE NOTE *** INCOMPLETE NOTE *** INCOMPLETE NOTE *** INCOMPLETE NOTE *** INCOMPLETE NOTE *** INCOMPLETE NOTE *** INCOMPLETE NOTE *** INCOMPLETE NOTE *** INCOMPLETE NOTE *** INCOMPLETE NOTE *** INCOMPLETE NOTE *** INCOMPLETE NOTE *** INCOMPLETE NOTE *** INCOMPLETE NOTE *** INCOMPLETE NOTE *** INCOMPLETE NOTE *** INCOMPLETE NOTE *** INCOMPLETE NOTE *** INCOMPLETE NOTE ***  RECS BELOW ARE NOT TO BE FOLLOWED UNTIL FINALIZED  amio gtt, argat gtt, atorva 40, dexmed gtt, insulin gtt, CRRT, cefurox, CRRT    Problem/Plan - 1:  ·  Problem: Non-ST elevation MI (NSTEMI).   ·  Plan:   - troponin peaked at > 46583   - [facility] 12/06 with IABP placement revealed that 3 grafts are closed w/ distal native disease from remaining LAD graft  - continue statin  - would start ASA when okay with surgical team.    Problem/Plan - 2:  ·  Problem: Gall stone pancreatitis.   ·  Plan:   - CT abd showing acute pancreatitis  - RUQ showing sludge, no stones  - lipase > 3000 11/24, normalized prior and now fluctuating  - conservative care  - appreciate GI recs, no intervention planned   - surgery following   - off of aBx, appreciate ID recs.    Problem/Plan - 3:  ·  Problem: ERIC (acute kidney injury).   ·  Plan:   - likely arrest associated ATN and now worsened from hypovolemia   - stop diuretics and aim to keep net even to positive   - renal following, c/t CVVH.    Problem/Plan - 4:  ·  Problem: Cardiogenic shock.   ·  Plan:   - ECMO decannulated 12/08 after successful wean  - IABP in place, keep 1:1, target augmented MAP 70s  - remains pulsatile  - cont argatroban for AC while on IABP  - will titrate GDMT as appropriate if above goal off pressors.    Problem/Plan - 5:  ·  Problem: Nontraumatic subdural hematoma.   ·  Plan:   - small 3mm subdural hemorrhage stable on repeat CTs  - okay to continue argatroban, appreciate neuro recs  - keep MAP < 75 mmHg and careful monitoring of PTT.    Problem/Plan - 6:  ·  Problem: Acute respiratory failure with hypoxia.   ·  Plan:   - CXR improved with more lung volumes after bronch and increased PEEP  - bronch showed erythematous airways with scant bleeding  - extubate when able  - continue checking right radial ABG.  - continue treatment for pancreatitis related ARDS  - caution with propofol gtt given pancreatis, would follow lipids.    Underwent decannulation today but noted to have mod-severe MR. Had aflutter afterwards s/p conversion to sinus. On exam, NAD, JVP approx 10-12 cm w/ HJR, RRR, CTA anterior lung fields, abdomen soft with bowel sounds, 1+ edema, pulses intact. Labs reviewed - WBC 17.8 (from 13), Na 136 (improved), BUN/Cr 34/1.89 (on HD), albumin 3.0, amylase/lipase stably elevated (improved from prior). LHC 12/6 revealed patent GUTIÉRREZ to LAD,  of RCA/LCx with L to L and L to R collaterals; aortogram revealed no filling of any other grafts.   - would consider resuming CVVH; goal net negative  - c/w IABP and will attempt wean with inotropic support if necessary  - c/w anticoagulation  - on statin  - prognosis guarded; family meeting held 12/6 and discussed at length current state and next steps with plan to f/u next week .  63 YO M with a history of CAD s/p 4v CABG ~2019 in [facility], DM2 (A1c 7.3%), and HTN who initially presented to OSH with abdominal pain and n/v and found to have pancreatitis with lipase > 3000 though also with elevated troponins. CT abdomen revealed acute pancreatitis and his initial LVEF was 40-45%. He developed MSOF with hypoxic respiratory failure requiring intubation and ERIC and went into hypoxic PEA arrest requiring ACLS and due to persistent hypoxia and hypotension on pressors after ROSC was cannulated to [facility] ECMO (LFV, RFA, no anterograde perfusion catheter) on 11/25. Notably CTH that day revealed small subdural hemorrhage.     His post arrest TTE on 11/26 showed a signficantly worse LVEF of 5-10% with an AV that was only minimally opening. Since then he has had improvement in his LV function (now ~35-40%) and improved pulsatility while tolerating progressive slow ECMO weans. ECMO turndown (note in chart 11/30) was reassuring for ability to decannulate to IABP/inotropes. LHC 12/06 showed closure of his 3 vein grafts with graft to LAD patent though with distal native disease. IABP placed, ECMO successful and decannulated 12/08 (transfused 2u pRBCs during). His ARDS is improving. Currently on CVVH.    Previous cardiac studies:  LHC (12/06/22) - patent LIMA-LAD, RCA , LCx , aortogram w/ closure of 3 vein grafts, LVEDP 22 mm Hg, left-to-left and left-to-right collaterals  TTE (12/03/22) - mod-to-sev segmental LVSD (inferolateral, inferior, inferoseptal hypokinesis)    Hemodynamics:  12/08 IABP 1:1, aMAP 120, aMAP 87, CVP 5  12/07 IABP 1:1 ECMO 3600 RPM 4.0 LPM, aMAP 90, aDBP 123, mVO2 66.8%, CVP 7, CO 6.8, CI 3.3    *** INCOMPLETE NOTE *** INCOMPLETE NOTE *** INCOMPLETE NOTE *** INCOMPLETE NOTE *** INCOMPLETE NOTE *** INCOMPLETE NOTE *** INCOMPLETE NOTE *** INCOMPLETE NOTE *** INCOMPLETE NOTE *** INCOMPLETE NOTE *** INCOMPLETE NOTE *** INCOMPLETE NOTE *** INCOMPLETE NOTE *** INCOMPLETE NOTE *** INCOMPLETE NOTE *** INCOMPLETE NOTE *** INCOMPLETE NOTE *** INCOMPLETE NOTE *** INCOMPLETE NOTE *** INCOMPLETE NOTE ***  RECS BELOW ARE NOT TO BE FOLLOWED UNTIL FINALIZED  amio gtt, argat gtt, atorva 40, dexmed gtt, insulin gtt, CRRT, cefurox, CRRT    Problem/Plan - 1:  ·  Problem: Non-ST elevation MI (NSTEMI).   ·  Plan:   - troponin peaked at > 18570   - [facility] 12/06 with IABP placement revealed that 3 grafts are closed w/ distal native disease from remaining LAD graft  - continue statin  - would start ASA when okay with surgical team.    Problem/Plan - 2:  ·  Problem: Gall stone pancreatitis.   ·  Plan:   - CT abd showing acute pancreatitis  - RUQ showing sludge, no stones  - lipase > 3000 11/24, normalized prior and now fluctuating  - conservative care  - appreciate GI recs, no intervention planned   - surgery following   - off of aBx, appreciate ID recs.    Problem/Plan - 3:  ·  Problem: ERIC (acute kidney injury).   ·  Plan:   - likely arrest associated ATN and now worsened from hypovolemia   - stop diuretics and aim to keep net even to positive   - renal following, c/t CVVH.    Problem/Plan - 4:  ·  Problem: Cardiogenic shock.   ·  Plan:   - ECMO decannulated 12/08 after successful wean  - IABP in place, keep 1:1, target augmented MAP 70s  - remains pulsatile  - cont argatroban for AC while on IABP  - will titrate GDMT as appropriate if above goal off pressors.    Problem/Plan - 5:  ·  Problem: Nontraumatic subdural hematoma.   ·  Plan:   - small 3mm subdural hemorrhage stable on repeat CTs  - okay to continue argatroban, appreciate neuro recs  - keep MAP < 75 mmHg and careful monitoring of PTT.    Problem/Plan - 6:  ·  Problem: Acute respiratory failure with hypoxia.   ·  Plan:   - CXR improved with more lung volumes after bronch and increased PEEP  - bronch showed erythematous airways with scant bleeding  - extubate when able  - continue checking right radial ABG.  - continue treatment for pancreatitis related ARDS  - caution with propofol gtt given pancreatis, would follow lipids.    Underwent decannulation today but noted to have mod-severe MR. Had aflutter afterwards s/p conversion to sinus. On exam, NAD, JVP approx 10-12 cm w/ HJR, RRR, CTA anterior lung fields, abdomen soft with bowel sounds, 1+ edema, pulses intact. Labs reviewed - WBC 17.8 (from 13), Na 136 (improved), BUN/Cr 34/1.89 (on HD), albumin 3.0, amylase/lipase stably elevated (improved from prior). LHC 12/6 revealed patent GUTIÉRREZ to LAD,  of RCA/LCx with L to L and L to R collaterals; aortogram revealed no filling of any other grafts.   - would consider resuming CVVH; goal net negative  - c/w IABP and will attempt wean with inotropic support if necessary  - c/w anticoagulation  - on statin  - prognosis guarded; family meeting held 12/6 and discussed at length current state and next steps with plan to f/u next week .  63 YO M with a history of CAD s/p 4v CABG ~2019 in [facility], DM2 (A1c 7.3%), and HTN who initially presented to OSH with abdominal pain and n/v and found to have pancreatitis with lipase > 3000 though also with elevated troponins. CT abdomen revealed acute pancreatitis and his initial LVEF was 40-45%. He developed MSOF with hypoxic respiratory failure requiring intubation and ERIC and went into hypoxic PEA arrest requiring ACLS and due to persistent hypoxia and hypotension on pressors after ROSC was cannulated to VA ECMO (LFV, RFA, no anterograde perfusion catheter) on 11/25. Notably CTH that day revealed small subdural hemorrhage.     His post arrest TTE on 11/26 showed a signficantly worse LVEF of 5-10% with an AV that was only minimally opening. Since then he has had improvement in his LV function (now ~35-40%) and improved pulsatility while tolerating progressive slow ECMO weans. ECMO turndown (note in chart 11/30) was reassuring for ability to decannulate to IABP/inotropes. LHC 12/06 showed closure of his 3 vein grafts with graft to LAD patent though with distal native disease. IABP placed, ECMO successful and decannulated 12/08 (transfused 2u pRBCs during). His ARDS is improving. Currently on CVVH.    Previous cardiac studies:  LHC (12/06/22) - patent LIMA-LAD, RCA , LCx , aortogram w/ closure of 3 vein grafts, LVEDP 22 mm Hg, left-to-left and left-to-right collaterals  TTE (12/03/22) - mod-to-sev segmental LVSD (inferolateral, inferior, inferoseptal hypokinesis)    Hemodynamics:  12/09 IABP 1:1, aDBP 126, aMAP 92, CVP 6, lactate 1.2  12/08 IABP 1:1, aDBP 120, aMAP 87, CVP 5  12/07 IABP 1:1 ECMO 3600 RPM 4.0 LPM, aMAP 90, aDBP 123, mVO2 66.8%, CVP 7, CO 6.8, CI 3.3 63 YO M with a history of CAD s/p 4v CABG ~2019 in [facility], DM2 (A1c 7.3%), and HTN who initially presented to OSH with abdominal pain and n/v and found to have pancreatitis with lipase > 3000 though also with elevated troponins. CT abdomen revealed acute pancreatitis and his initial LVEF was 40-45%. He developed MSOF with hypoxic respiratory failure requiring intubation and ERIC and went into hypoxic PEA arrest requiring ACLS and due to persistent hypoxia and hypotension on pressors after ROSC was cannulated to VA ECMO (LFV, RFA, no anterograde perfusion catheter) on 11/25. Notably CTH that day revealed small subdural hemorrhage.     His post arrest TTE on 11/26 showed a signficantly worse LVEF of 5-10% with an AV that was only minimally opening. Since then he has had improvement in his LV function (now ~35-40%) and improved pulsatility while tolerating progressive slow ECMO weans. ECMO turndown (note in chart 11/30) was reassuring for ability to decannulate to IABP/inotropes. LHC 12/06 showed closure of his 3 vein grafts with graft to LAD patent though with distal native disease. IABP placed, ECMO successful and decannulated 12/08 (transfused 2u pRBCs during). His ARDS is improving. Currently on CVVH.    Previous cardiac studies:  LHC (12/06/22) - patent LIMA-LAD, RCA , LCx , aortogram w/ closure of 3 vein grafts, LVEDP 22 mm Hg, left-to-left and left-to-right collaterals  TTE (12/03/22) - mod-to-sev segmental LVSD (inferolateral, inferior, inferoseptal hypokinesis)    Hemodynamics:  12/09 IABP 1:1, aDBP 126, aMAP 92, CVP 6, lactate 1.2  12/08 IABP 1:1, aDBP 120, aMAP 87, CVP 5  12/07 IABP 1:1 ECMO 3600 RPM 4.0 LPM, aMAP 90, aDBP 123, mVO2 66.8%, CVP 7, CO 6.8, CI 3.3 61 YO M with a history of CAD s/p 4v CABG ~2019 in [facility], DM2 (A1c 7.3%), and HTN who initially presented to OSH with abdominal pain and n/v and found to have pancreatitis with lipase > 3000 though also with elevated troponins. CT abdomen revealed acute pancreatitis and his initial LVEF was 40-45%. He developed MSOF with hypoxic respiratory failure requiring intubation and ERIC and went into hypoxic PEA arrest requiring ACLS and due to persistent hypoxia and hypotension on pressors after ROSC was cannulated to VA ECMO (LFV, RFA, no anterograde perfusion catheter) on 11/25. Notably CTH that day revealed small subdural hemorrhage.     His post arrest TTE on 11/26 showed a signficantly worse LVEF of 5-10% with an AV that was only minimally opening. Since then he has had improvement in his LV function (now ~35-40%) and improved pulsatility while tolerating progressive slow ECMO weans. ECMO turndown (note in chart 11/30) was reassuring for ability to decannulate to IABP/inotropes. LHC 12/06 showed closure of his 3 vein grafts with graft to LAD patent though with distal native disease. IABP placed, ECMO successful and decannulated 12/08 (transfused 2u pRBCs during). His ARDS is improving. Currently on CVVH.    Previous cardiac studies:  LHC (12/06/22) - patent LIMA-LAD, RCA , LCx , aortogram w/ closure of 3 vein grafts, LVEDP 22 mm Hg, left-to-left and left-to-right collaterals  TTE (12/03/22) - mod-to-sev segmental LVSD (inferolateral, inferior, inferoseptal hypokinesis)    Hemodynamics:  12/09 IABP 1:1, aDBP 126, aMAP 92, CVP 6, lactate 1.2  12/08 IABP 1:1, aDBP 120, aMAP 87, CVP 5  12/07 IABP 1:1 ECMO 3600 RPM 4.0 LPM, aMAP 90, aDBP 123, mVO2 66.8%, CVP 7, CO 6.8, CI 3.3

## 2022-12-09 NOTE — PROGRESS NOTE ADULT - SUBJECTIVE AND OBJECTIVE BOX
CARDIOLOGY CONSULT PROGRESS NOTE  EDUARDO REAL  [id number]    INTERVAL EVENTS:  - tele:   -     ROS:  Negative, except as above.    MEDICATIONS  (STANDING):  aMIOdarone Infusion 0.5 mG/Min (16.7 mL/Hr) IV Continuous <Continuous>  argatroban Infusion 0.14 MICROgram(s)/kG/Min (0.78 mL/Hr) IV Continuous <Continuous>  atorvastatin 40 milliGRAM(s) Oral at bedtime  cefuroxime  IVPB 1500 milliGRAM(s) IV Intermittent every 24 hours  chlorhexidine 0.12% Liquid 15 milliLiter(s) Oral Mucosa every 12 hours  chlorhexidine 2% Cloths 1 Application(s) Topical <User Schedule>  CRRT Treatment    <Continuous>  dexMEDEtomidine Infusion 1 MICROgram(s)/kG/Hr (23.2 mL/Hr) IV Continuous <Continuous>  dextrose 50% Injectable 25 milliLiter(s) IV Push every 15 minutes  dextrose 50% Injectable 50 milliLiter(s) IV Push every 15 minutes  insulin regular Infusion 3 Unit(s)/Hr (3 mL/Hr) IV Continuous <Continuous>  pantoprazole  Injectable 40 milliGRAM(s) IV Push two times a day  Phoxillum Filtration BK 4 / 2.5 5000 milliLiter(s) (2000 mL/Hr) CRRT <Continuous>  polyethylene glycol 3350 17 Gram(s) Oral two times a day  PrismaSOL Filtration BGK 4 / 2.5 5000 milliLiter(s) (800 mL/Hr) CRRT <Continuous>  PrismaSOL Filtration BGK 4 / 2.5 5000 milliLiter(s) (200 mL/Hr) CRRT <Continuous>  senna 2 Tablet(s) Oral at bedtime  sodium chloride 0.9%. 1000 milliLiter(s) (10 mL/Hr) IV Continuous <Continuous>    Allergies  No Known Allergies    P/E:  Vital Signs Last 24 Hrs  T(C): 36.9 (09 Dec 2022 04:00), Max: 38.1 (08 Dec 2022 18:00)  T(F): 98.4 (09 Dec 2022 04:00), Max: 100.6 (08 Dec 2022 18:00)  HR: 116 (09 Dec 2022 07:00) (74 - 134)  BP: 108/54 (08 Dec 2022 07:40) (108/54 - 108/54)  BP(mean): 76 (08 Dec 2022 07:40) (76 - 76)  RR: 23 (09 Dec 2022 07:00) (1 - 24)  SpO2: 100% (09 Dec 2022 07:00) (95% - 100%)    Patient On (Oxygen Delivery Method): ventilator  O2 Concentration (%): 40    Daily Height in cm: 172.72 (08 Dec 2022 07:40)    Daily Weight in k.3 (09 Dec 2022 00:00)    I&O's Detail  08 Dec 2022 07:01  -  09 Dec 2022 07:00  IN:    Albumin 25%  -  50 mL: 100 mL    Amiodarone: 298.2 mL    Amiodarone: 150.3 mL    Argatroban: 9 mL    Argatroban: 0.9 mL    Argatroban: 2.2 mL    Dexmedetomidine: 288 mL    Dexmedetomidine: 185.6 mL    Insulin: 133 mL    Insulin: 11 mL    IV PiggyBack: 250 mL    Norepinephrine: 8.7 mL    Other (mL): 5 mL    sodium chloride 0.9%: 90 mL    sodium chloride 0.9%: 30 mL    Vasopressin: 45 mL    Vital1.5: 910 mL  Total IN: 2516.9 mL  OUT:    Indwelling Catheter - Urethral (mL): 210 mL    Other (mL): 719 mL  Total OUT: 929 mL  Total NET: 1587.9 mL    I&O's Summary  08 Dec 2022 07:01  -  09 Dec 2022 07:00  --------------------------------------------------------  IN: 2516.9 mL / OUT: 929 mL / NET: 1587.9 mL    - gen: intubated/sedated, laying in hospital bed, rousable and follows commands  - HEENT: NCAT, ETT in place, MMM  - heart: RRR, systolic murmur, S1/S2  - lungs: mechanical BS  - abd: distended, soft, NT, hypoactive BS  - ext: WWP, PPP, 2+ MESERET, IABP L groin, wound vac R groin    RELEVANT RECENT LABS/IMAGING/STUDIES:                        8.5    13.43 )-----------( 151      ( 09 Dec 2022 00:19 )             25.7         137  |  103  |  52<H>  ----------------------------<  146<H>  4.7   |  20<L>  |  3.10<H>    Ca    8.1<L>      09 Dec 2022 00:19  Phos  2.9       Mg     3.0         TPro  6.0  /  Alb  3.0<L>  /  TBili  1.0  /  DBili  x   /  AST  56<H>  /  ALT  9<L>  /  AlkPhos  186<H>      LIVER FUNCTIONS - ( 09 Dec 2022 00:19 )  Alb: 3.0 g/dL / Pro: 6.0 g/dL / ALK PHOS: 186 U/L / ALT: 9 U/L / AST: 56 U/L / GGT: x           PT/INR - ( 08 Dec 2022 10:58 )   PT: 13.5 sec;   INR: 1.16 ratio       PTT - ( 09 Dec 2022 00:19 )  PTT:40.5 sec    ABG - ( 09 Dec 2022 00:05 )  pH, Arterial: 7.40  pH, Blood: x     /  pCO2: 34    /  pO2: 153   / HCO3: 21    / Base Excess: -3.2  /  SaO2: 99.1   CARDIOLOGY CONSULT PROGRESS NOTE  EDUARDO REAL  [id number]    INTERVAL EVENTS:  - tele:   -     ROS:  Negative, except as above.    MEDICATIONS  (STANDING):  aMIOdarone Infusion 0.5 mG/Min (16.7 mL/Hr) IV Continuous <Continuous>  argatroban Infusion 0.14 MICROgram(s)/kG/Min (0.78 mL/Hr) IV Continuous <Continuous>  atorvastatin 40 milliGRAM(s) Oral at bedtime  cefuroxime  IVPB 1500 milliGRAM(s) IV Intermittent every 24 hours  chlorhexidine 0.12% Liquid 15 milliLiter(s) Oral Mucosa every 12 hours  chlorhexidine 2% Cloths 1 Application(s) Topical <User Schedule>  CRRT Treatment    <Continuous>  dexMEDEtomidine Infusion 1 MICROgram(s)/kG/Hr (23.2 mL/Hr) IV Continuous <Continuous>  dextrose 50% Injectable 25 milliLiter(s) IV Push every 15 minutes  dextrose 50% Injectable 50 milliLiter(s) IV Push every 15 minutes  insulin regular Infusion 3 Unit(s)/Hr (3 mL/Hr) IV Continuous <Continuous>  pantoprazole  Injectable 40 milliGRAM(s) IV Push two times a day  Phoxillum Filtration BK 4 / 2.5 5000 milliLiter(s) (2000 mL/Hr) CRRT <Continuous>  polyethylene glycol 3350 17 Gram(s) Oral two times a day  PrismaSOL Filtration BGK 4 / 2.5 5000 milliLiter(s) (800 mL/Hr) CRRT <Continuous>  PrismaSOL Filtration BGK 4 / 2.5 5000 milliLiter(s) (200 mL/Hr) CRRT <Continuous>  senna 2 Tablet(s) Oral at bedtime  sodium chloride 0.9%. 1000 milliLiter(s) (10 mL/Hr) IV Continuous <Continuous>    Allergies  No Known Allergies    P/E:  Vital Signs Last 24 Hrs  T(C): 36.9 (09 Dec 2022 04:00), Max: 38.1 (08 Dec 2022 18:00)  T(F): 98.4 (09 Dec 2022 04:00), Max: 100.6 (08 Dec 2022 18:00)  HR: 116 (09 Dec 2022 07:00) (74 - 134)  BP: 108/54 (08 Dec 2022 07:40) (108/54 - 108/54)  BP(mean): 76 (08 Dec 2022 07:40) (76 - 76)  RR: 23 (09 Dec 2022 07:00) (1 - 24)  SpO2: 100% (09 Dec 2022 07:00) (95% - 100%)    Patient On (Oxygen Delivery Method): ventilator  O2 Concentration (%): 40    Daily Height in cm: 172.72 (08 Dec 2022 07:40)    Daily Weight in k.3 (09 Dec 2022 00:00)    I&O's Detail  08 Dec 2022 07:01  -  09 Dec 2022 07:00  IN:    Albumin 25%  -  50 mL: 100 mL    Amiodarone: 298.2 mL    Amiodarone: 150.3 mL    Argatroban: 9 mL    Argatroban: 0.9 mL    Argatroban: 2.2 mL    Dexmedetomidine: 288 mL    Dexmedetomidine: 185.6 mL    Insulin: 133 mL    Insulin: 11 mL    IV PiggyBack: 250 mL    Norepinephrine: 8.7 mL    Other (mL): 5 mL    sodium chloride 0.9%: 90 mL    sodium chloride 0.9%: 30 mL    Vasopressin: 45 mL    Vital1.5: 910 mL  Total IN: 2516.9 mL  OUT:    Indwelling Catheter - Urethral (mL): 210 mL    Other (mL): 719 mL  Total OUT: 929 mL  Total NET: 1587.9 mL    I&O's Summary  08 Dec 2022 07:01  -  09 Dec 2022 07:00  --------------------------------------------------------  IN: 2516.9 mL / OUT: 929 mL / NET: 1587.9 mL    - gen: intubated/sedated, laying in hospital bed, rousable and follows commands  - HEENT: NCAT, ETT in place, MMM  - heart: RRR, systolic murmur, S1/S2  - lungs: mechanical BS  - abd: distended, soft, NT, hypoactive BS  - ext: WWP, PPP, 2+ MESERET, IABP L groin, wound vac R groin    RELEVANT RECENT LABS/IMAGING/STUDIES:                        8.5    13.43 )-----------( 151      ( 09 Dec 2022 00:19 )             25.7         137  |  103  |  52<H>  ----------------------------<  146<H>  4.7   |  20<L>  |  3.10<H>    Ca    8.1<L>      09 Dec 2022 00:19  Phos  2.9       Mg     3.0         TPro  6.0  /  Alb  3.0<L>  /  TBili  1.0  /  DBili  x   /  AST  56<H>  /  ALT  9<L>  /  AlkPhos  186<H>      LIVER FUNCTIONS - ( 09 Dec 2022 00:19 )  Alb: 3.0 g/dL / Pro: 6.0 g/dL / ALK PHOS: 186 U/L / ALT: 9 U/L / AST: 56 U/L / GGT: x           PT/INR - ( 08 Dec 2022 10:58 )   PT: 13.5 sec;   INR: 1.16 ratio       PTT - ( 09 Dec 2022 00:19 )  PTT:40.5 sec    ABG - ( 09 Dec 2022 00:05 )  pH, Arterial: 7.40  pH, Blood: x     /  pCO2: 34    /  pO2: 153   / HCO3: 21    / Base Excess: -3.2  /  SaO2: 99.1   CARDIOLOGY CONSULT PROGRESS NOTE  EDUARDO REAL  [id number]    INTERVAL EVENTS:  - tele:   -     ROS:  Negative, except as above.    MEDICATIONS  (STANDING):  aMIOdarone Infusion 0.5 mG/Min (16.7 mL/Hr) IV Continuous <Continuous>  argatroban Infusion 0.14 MICROgram(s)/kG/Min (0.78 mL/Hr) IV Continuous <Continuous>  atorvastatin 40 milliGRAM(s) Oral at bedtime  cefuroxime  IVPB 1500 milliGRAM(s) IV Intermittent every 24 hours  chlorhexidine 0.12% Liquid 15 milliLiter(s) Oral Mucosa every 12 hours  chlorhexidine 2% Cloths 1 Application(s) Topical <User Schedule>  CRRT Treatment    <Continuous>  dexMEDEtomidine Infusion 1 MICROgram(s)/kG/Hr (23.2 mL/Hr) IV Continuous <Continuous>  dextrose 50% Injectable 25 milliLiter(s) IV Push every 15 minutes  dextrose 50% Injectable 50 milliLiter(s) IV Push every 15 minutes  insulin regular Infusion 3 Unit(s)/Hr (3 mL/Hr) IV Continuous <Continuous>  pantoprazole  Injectable 40 milliGRAM(s) IV Push two times a day  Phoxillum Filtration BK 4 / 2.5 5000 milliLiter(s) (2000 mL/Hr) CRRT <Continuous>  polyethylene glycol 3350 17 Gram(s) Oral two times a day  PrismaSOL Filtration BGK 4 / 2.5 5000 milliLiter(s) (800 mL/Hr) CRRT <Continuous>  PrismaSOL Filtration BGK 4 / 2.5 5000 milliLiter(s) (200 mL/Hr) CRRT <Continuous>  senna 2 Tablet(s) Oral at bedtime  sodium chloride 0.9%. 1000 milliLiter(s) (10 mL/Hr) IV Continuous <Continuous>    Allergies  No Known Allergies    P/E:  Vital Signs Last 24 Hrs  T(C): 36.9 (09 Dec 2022 04:00), Max: 38.1 (08 Dec 2022 18:00)  T(F): 98.4 (09 Dec 2022 04:00), Max: 100.6 (08 Dec 2022 18:00)  HR: 116 (09 Dec 2022 07:00) (74 - 134)  BP: 108/54 (08 Dec 2022 07:40) (108/54 - 108/54)  BP(mean): 76 (08 Dec 2022 07:40) (76 - 76)  RR: 23 (09 Dec 2022 07:00) (1 - 24)  SpO2: 100% (09 Dec 2022 07:00) (95% - 100%)    Patient On (Oxygen Delivery Method): ventilator  O2 Concentration (%): 40    Daily Height in cm: 172.72 (08 Dec 2022 07:40)    Daily Weight in k.3 (09 Dec 2022 00:00)    I&O's Detail  08 Dec 2022 07:01  -  09 Dec 2022 07:00  IN:    Albumin 25%  -  50 mL: 100 mL    Amiodarone: 298.2 mL    Amiodarone: 150.3 mL    Argatroban: 9 mL    Argatroban: 0.9 mL    Argatroban: 2.2 mL    Dexmedetomidine: 288 mL    Dexmedetomidine: 185.6 mL    Insulin: 133 mL    Insulin: 11 mL    IV PiggyBack: 250 mL    Norepinephrine: 8.7 mL    Other (mL): 5 mL    sodium chloride 0.9%: 90 mL    sodium chloride 0.9%: 30 mL    Vasopressin: 45 mL    Vital1.5: 910 mL  Total IN: 2516.9 mL  OUT:    Indwelling Catheter - Urethral (mL): 210 mL    Other (mL): 719 mL  Total OUT: 929 mL  Total NET: 1587.9 mL    I&O's Summary  08 Dec 2022 07:01  -  09 Dec 2022 07:00  --------------------------------------------------------  IN: 2516.9 mL / OUT: 929 mL / NET: 1587.9 mL    - gen: intubated/sedated, laying in hospital bed, rousable and follows commands  - HEENT: NCAT, ETT in place, MMM  - heart: RRR, systolic murmur, S1/S2  - lungs: mechanical BS  - abd: distended, soft, NT, hypoactive BS  - ext: WWP, PPP, 2+ MESERET, IABP L groin, wound vac R groin    RELEVANT RECENT LABS/IMAGING/STUDIES:                        8.5    13.43 )-----------( 151      ( 09 Dec 2022 00:19 )             25.7         137  |  103  |  52<H>  ----------------------------<  146<H>  4.7   |  20<L>  |  3.10<H>    Ca    8.1<L>      09 Dec 2022 00:19  Phos  2.9       Mg     3.0         TPro  6.0  /  Alb  3.0<L>  /  TBili  1.0  /  DBili  x   /  AST  56<H>  /  ALT  9<L>  /  AlkPhos  186<H>      LIVER FUNCTIONS - ( 09 Dec 2022 00:19 )  Alb: 3.0 g/dL / Pro: 6.0 g/dL / ALK PHOS: 186 U/L / ALT: 9 U/L / AST: 56 U/L / GGT: x           PT/INR - ( 08 Dec 2022 10:58 )   PT: 13.5 sec;   INR: 1.16 ratio       PTT - ( 09 Dec 2022 00:19 )  PTT:40.5 sec    ABG - ( 09 Dec 2022 00:05 )  pH, Arterial: 7.40  pH, Blood: x     /  pCO2: 34    /  pO2: 153   / HCO3: 21    / Base Excess: -3.2  /  SaO2: 99.1   CARDIOLOGY CONSULT PROGRESS NOTE  EDUARDO REAL  [id number]    INTERVAL EVENTS:  - tele: NSR 80-130s, appears to have flutter/atach intermittently 2:1 ~ 124 bpm  - intubated/sedated, awake and following commands    ROS:  Negative, except as above.    MEDICATIONS  (STANDING):  aMIOdarone Infusion 0.5 mG/Min (16.7 mL/Hr) IV Continuous <Continuous>  argatroban Infusion 0.14 MICROgram(s)/kG/Min (0.78 mL/Hr) IV Continuous <Continuous>  atorvastatin 40 milliGRAM(s) Oral at bedtime  cefuroxime  IVPB 1500 milliGRAM(s) IV Intermittent every 24 hours  chlorhexidine 0.12% Liquid 15 milliLiter(s) Oral Mucosa every 12 hours  chlorhexidine 2% Cloths 1 Application(s) Topical <User Schedule>  CRRT Treatment    <Continuous>  dexMEDEtomidine Infusion 1 MICROgram(s)/kG/Hr (23.2 mL/Hr) IV Continuous <Continuous>  dextrose 50% Injectable 25 milliLiter(s) IV Push every 15 minutes  dextrose 50% Injectable 50 milliLiter(s) IV Push every 15 minutes  insulin regular Infusion 3 Unit(s)/Hr (3 mL/Hr) IV Continuous <Continuous>  pantoprazole  Injectable 40 milliGRAM(s) IV Push two times a day  Phoxillum Filtration BK 4 / 2.5 5000 milliLiter(s) (2000 mL/Hr) CRRT <Continuous>  polyethylene glycol 3350 17 Gram(s) Oral two times a day  PrismaSOL Filtration BGK 4 / 2.5 5000 milliLiter(s) (800 mL/Hr) CRRT <Continuous>  PrismaSOL Filtration BGK 4 / 2.5 5000 milliLiter(s) (200 mL/Hr) CRRT <Continuous>  senna 2 Tablet(s) Oral at bedtime  sodium chloride 0.9%. 1000 milliLiter(s) (10 mL/Hr) IV Continuous <Continuous>    Allergies  No Known Allergies    P/E:  Vital Signs Last 24 Hrs  T(C): 36.9 (09 Dec 2022 04:00), Max: 38.1 (08 Dec 2022 18:00)  T(F): 98.4 (09 Dec 2022 04:00), Max: 100.6 (08 Dec 2022 18:00)  HR: 116 (09 Dec 2022 07:00) (74 - 134)  BP: 108/54 (08 Dec 2022 07:40) (108/54 - 108/54)  BP(mean): 76 (08 Dec 2022 07:40) (76 - 76)  RR: 23 (09 Dec 2022 07:00) (1 - 24)  SpO2: 100% (09 Dec 2022 07:00) (95% - 100%)    Patient On (Oxygen Delivery Method): ventilator  O2 Concentration (%): 40    Daily Height in cm: 172.72 (08 Dec 2022 07:40)    Daily Weight in k.3 (09 Dec 2022 00:00)    I&O's Detail  08 Dec 2022 07:01  -  09 Dec 2022 07:00  IN:    Albumin 25%  -  50 mL: 100 mL    Amiodarone: 298.2 mL    Amiodarone: 150.3 mL    Argatroban: 9 mL    Argatroban: 0.9 mL    Argatroban: 2.2 mL    Dexmedetomidine: 288 mL    Dexmedetomidine: 185.6 mL    Insulin: 133 mL    Insulin: 11 mL    IV PiggyBack: 250 mL    Norepinephrine: 8.7 mL    Other (mL): 5 mL    sodium chloride 0.9%: 90 mL    sodium chloride 0.9%: 30 mL    Vasopressin: 45 mL    Vital1.5: 910 mL  Total IN: 2516.9 mL  OUT:    Indwelling Catheter - Urethral (mL): 210 mL    Other (mL): 719 mL  Total OUT: 929 mL  Total NET: 1587.9 mL    I&O's Summary  08 Dec 2022 07:01  -  09 Dec 2022 07:00  --------------------------------------------------------  IN: 2516.9 mL / OUT: 929 mL / NET: 1587.9 mL    - gen: intubated/sedated, laying in hospital bed, rousable and follows commands  - HEENT: NCAT, ETT in place, MMM  - heart: reg tach, systolic murmur, S1/S2  - lungs: mechanical BS  - abd: distended, soft, NT, hypoactive BS  - ext: WWP, PPP, 2+ MESERET, IABP L groin, wound vac R groin    RELEVANT RECENT LABS/IMAGING/STUDIES:                        8.5    13.43 )-----------( 151      ( 09 Dec 2022 00:19 )             25.7         137  |  103  |  52<H>  ----------------------------<  146<H>  4.7   |  20<L>  |  3.10<H>    Ca    8.1<L>      09 Dec 2022 00:19  Phos  2.9       Mg     3.0         TPro  6.0  /  Alb  3.0<L>  /  TBili  1.0  /  DBili  x   /  AST  56<H>  /  ALT  9<L>  /  AlkPhos  186<H>      LIVER FUNCTIONS - ( 09 Dec 2022 00:19 )  Alb: 3.0 g/dL / Pro: 6.0 g/dL / ALK PHOS: 186 U/L / ALT: 9 U/L / AST: 56 U/L / GGT: x           PT/INR - ( 08 Dec 2022 10:58 )   PT: 13.5 sec;   INR: 1.16 ratio       PTT - ( 09 Dec 2022 00:19 )  PTT:40.5 sec    ABG - ( 09 Dec 2022 00:05 )  pH, Arterial: 7.40  pH, Blood: x     /  pCO2: 34    /  pO2: 153   / HCO3: 21    / Base Excess: -3.2  /  SaO2: 99.1   CARDIOLOGY CONSULT PROGRESS NOTE  EDUARDO REAL  [id number]    INTERVAL EVENTS:  - tele: NSR 80-130s, appears to have flutter/atach intermittently 2:1 ~ 124 bpm  - intubated/sedated, awake and following commands    ROS:  Negative, except as above.    MEDICATIONS  (STANDING):  aMIOdarone Infusion 0.5 mG/Min (16.7 mL/Hr) IV Continuous <Continuous>  argatroban Infusion 0.14 MICROgram(s)/kG/Min (0.78 mL/Hr) IV Continuous <Continuous>  atorvastatin 40 milliGRAM(s) Oral at bedtime  cefuroxime  IVPB 1500 milliGRAM(s) IV Intermittent every 24 hours  chlorhexidine 0.12% Liquid 15 milliLiter(s) Oral Mucosa every 12 hours  chlorhexidine 2% Cloths 1 Application(s) Topical <User Schedule>  CRRT Treatment    <Continuous>  dexMEDEtomidine Infusion 1 MICROgram(s)/kG/Hr (23.2 mL/Hr) IV Continuous <Continuous>  dextrose 50% Injectable 25 milliLiter(s) IV Push every 15 minutes  dextrose 50% Injectable 50 milliLiter(s) IV Push every 15 minutes  insulin regular Infusion 3 Unit(s)/Hr (3 mL/Hr) IV Continuous <Continuous>  pantoprazole  Injectable 40 milliGRAM(s) IV Push two times a day  Phoxillum Filtration BK 4 / 2.5 5000 milliLiter(s) (2000 mL/Hr) CRRT <Continuous>  polyethylene glycol 3350 17 Gram(s) Oral two times a day  PrismaSOL Filtration BGK 4 / 2.5 5000 milliLiter(s) (800 mL/Hr) CRRT <Continuous>  PrismaSOL Filtration BGK 4 / 2.5 5000 milliLiter(s) (200 mL/Hr) CRRT <Continuous>  senna 2 Tablet(s) Oral at bedtime  sodium chloride 0.9%. 1000 milliLiter(s) (10 mL/Hr) IV Continuous <Continuous>    Allergies  No Known Allergies    P/E:  Vital Signs Last 24 Hrs  T(C): 36.9 (09 Dec 2022 04:00), Max: 38.1 (08 Dec 2022 18:00)  T(F): 98.4 (09 Dec 2022 04:00), Max: 100.6 (08 Dec 2022 18:00)  HR: 116 (09 Dec 2022 07:00) (74 - 134)  BP: 108/54 (08 Dec 2022 07:40) (108/54 - 108/54)  BP(mean): 76 (08 Dec 2022 07:40) (76 - 76)  RR: 23 (09 Dec 2022 07:00) (1 - 24)  SpO2: 100% (09 Dec 2022 07:00) (95% - 100%)    Patient On (Oxygen Delivery Method): ventilator  O2 Concentration (%): 40    Daily Height in cm: 172.72 (08 Dec 2022 07:40)    Daily Weight in k.3 (09 Dec 2022 00:00)    I&O's Detail  08 Dec 2022 07:01  -  09 Dec 2022 07:00  IN:    Albumin 25%  -  50 mL: 100 mL    Amiodarone: 298.2 mL    Amiodarone: 150.3 mL    Argatroban: 9 mL    Argatroban: 0.9 mL    Argatroban: 2.2 mL    Dexmedetomidine: 288 mL    Dexmedetomidine: 185.6 mL    Insulin: 133 mL    Insulin: 11 mL    IV PiggyBack: 250 mL    Norepinephrine: 8.7 mL    Other (mL): 5 mL    sodium chloride 0.9%: 90 mL    sodium chloride 0.9%: 30 mL    Vasopressin: 45 mL    Vital1.5: 910 mL  Total IN: 2516.9 mL  OUT:    Indwelling Catheter - Urethral (mL): 210 mL    Other (mL): 719 mL  Total OUT: 929 mL  Total NET: 1587.9 mL    I&O's Summary  08 Dec 2022 07:01  -  09 Dec 2022 07:00  --------------------------------------------------------  IN: 2516.9 mL / OUT: 929 mL / NET: 1587.9 mL    - gen: intubated/sedated, laying in hospital bed, rousable and follows commands  - HEENT: NCAT, ETT in place, MMM  - heart: reg tach, systolic murmur, S1/S2  - lungs: mechanical BS  - abd: distended, soft, NT, hypoactive BS  - ext: WWP, PPP, 2+ MESERET, IABP L groin, wound vac R groin    RELEVANT RECENT LABS/IMAGING/STUDIES:                        8.5    13.43 )-----------( 151      ( 09 Dec 2022 00:19 )             25.7         137  |  103  |  52<H>  ----------------------------<  146<H>  4.7   |  20<L>  |  3.10<H>    Ca    8.1<L>      09 Dec 2022 00:19  Phos  2.9       Mg     3.0         TPro  6.0  /  Alb  3.0<L>  /  TBili  1.0  /  DBili  x   /  AST  56<H>  /  ALT  9<L>  /  AlkPhos  186<H>      LIVER FUNCTIONS - ( 09 Dec 2022 00:19 )  Alb: 3.0 g/dL / Pro: 6.0 g/dL / ALK PHOS: 186 U/L / ALT: 9 U/L / AST: 56 U/L / GGT: x           PT/INR - ( 08 Dec 2022 10:58 )   PT: 13.5 sec;   INR: 1.16 ratio       PTT - ( 09 Dec 2022 00:19 )  PTT:40.5 sec    ABG - ( 09 Dec 2022 00:05 )  pH, Arterial: 7.40  pH, Blood: x     /  pCO2: 34    /  pO2: 153   / HCO3: 21    / Base Excess: -3.2  /  SaO2: 99.1   CARDIOLOGY CONSULT PROGRESS NOTE  EDUARDO REAL  [id number]    INTERVAL EVENTS:  - tele: NSR 80-130s, appears to have flutter/atach intermittently 2:1 ~ 124 bpm  - intubated/sedated, awake and following commands    ROS:  Negative, except as above.    MEDICATIONS  (STANDING):  aMIOdarone Infusion 0.5 mG/Min (16.7 mL/Hr) IV Continuous <Continuous>  argatroban Infusion 0.14 MICROgram(s)/kG/Min (0.78 mL/Hr) IV Continuous <Continuous>  atorvastatin 40 milliGRAM(s) Oral at bedtime  cefuroxime  IVPB 1500 milliGRAM(s) IV Intermittent every 24 hours  chlorhexidine 0.12% Liquid 15 milliLiter(s) Oral Mucosa every 12 hours  chlorhexidine 2% Cloths 1 Application(s) Topical <User Schedule>  CRRT Treatment    <Continuous>  dexMEDEtomidine Infusion 1 MICROgram(s)/kG/Hr (23.2 mL/Hr) IV Continuous <Continuous>  dextrose 50% Injectable 25 milliLiter(s) IV Push every 15 minutes  dextrose 50% Injectable 50 milliLiter(s) IV Push every 15 minutes  insulin regular Infusion 3 Unit(s)/Hr (3 mL/Hr) IV Continuous <Continuous>  pantoprazole  Injectable 40 milliGRAM(s) IV Push two times a day  Phoxillum Filtration BK 4 / 2.5 5000 milliLiter(s) (2000 mL/Hr) CRRT <Continuous>  polyethylene glycol 3350 17 Gram(s) Oral two times a day  PrismaSOL Filtration BGK 4 / 2.5 5000 milliLiter(s) (800 mL/Hr) CRRT <Continuous>  PrismaSOL Filtration BGK 4 / 2.5 5000 milliLiter(s) (200 mL/Hr) CRRT <Continuous>  senna 2 Tablet(s) Oral at bedtime  sodium chloride 0.9%. 1000 milliLiter(s) (10 mL/Hr) IV Continuous <Continuous>    Allergies  No Known Allergies    P/E:  Vital Signs Last 24 Hrs  T(C): 36.9 (09 Dec 2022 04:00), Max: 38.1 (08 Dec 2022 18:00)  T(F): 98.4 (09 Dec 2022 04:00), Max: 100.6 (08 Dec 2022 18:00)  HR: 116 (09 Dec 2022 07:00) (74 - 134)  BP: 108/54 (08 Dec 2022 07:40) (108/54 - 108/54)  BP(mean): 76 (08 Dec 2022 07:40) (76 - 76)  RR: 23 (09 Dec 2022 07:00) (1 - 24)  SpO2: 100% (09 Dec 2022 07:00) (95% - 100%)    Patient On (Oxygen Delivery Method): ventilator  O2 Concentration (%): 40    Daily Height in cm: 172.72 (08 Dec 2022 07:40)    Daily Weight in k.3 (09 Dec 2022 00:00)    I&O's Detail  08 Dec 2022 07:01  -  09 Dec 2022 07:00  IN:    Albumin 25%  -  50 mL: 100 mL    Amiodarone: 298.2 mL    Amiodarone: 150.3 mL    Argatroban: 9 mL    Argatroban: 0.9 mL    Argatroban: 2.2 mL    Dexmedetomidine: 288 mL    Dexmedetomidine: 185.6 mL    Insulin: 133 mL    Insulin: 11 mL    IV PiggyBack: 250 mL    Norepinephrine: 8.7 mL    Other (mL): 5 mL    sodium chloride 0.9%: 90 mL    sodium chloride 0.9%: 30 mL    Vasopressin: 45 mL    Vital1.5: 910 mL  Total IN: 2516.9 mL  OUT:    Indwelling Catheter - Urethral (mL): 210 mL    Other (mL): 719 mL  Total OUT: 929 mL  Total NET: 1587.9 mL    I&O's Summary  08 Dec 2022 07:01  -  09 Dec 2022 07:00  --------------------------------------------------------  IN: 2516.9 mL / OUT: 929 mL / NET: 1587.9 mL    - gen: intubated/sedated, laying in hospital bed, rousable and follows commands  - HEENT: NCAT, ETT in place, MMM  - heart: reg tach, systolic murmur, S1/S2  - lungs: mechanical BS  - abd: distended, soft, NT, hypoactive BS  - ext: WWP, PPP, 2+ MESERET, IABP L groin, wound vac R groin    RELEVANT RECENT LABS/IMAGING/STUDIES:                        8.5    13.43 )-----------( 151      ( 09 Dec 2022 00:19 )             25.7         137  |  103  |  52<H>  ----------------------------<  146<H>  4.7   |  20<L>  |  3.10<H>    Ca    8.1<L>      09 Dec 2022 00:19  Phos  2.9       Mg     3.0         TPro  6.0  /  Alb  3.0<L>  /  TBili  1.0  /  DBili  x   /  AST  56<H>  /  ALT  9<L>  /  AlkPhos  186<H>      LIVER FUNCTIONS - ( 09 Dec 2022 00:19 )  Alb: 3.0 g/dL / Pro: 6.0 g/dL / ALK PHOS: 186 U/L / ALT: 9 U/L / AST: 56 U/L / GGT: x           PT/INR - ( 08 Dec 2022 10:58 )   PT: 13.5 sec;   INR: 1.16 ratio       PTT - ( 09 Dec 2022 00:19 )  PTT:40.5 sec    ABG - ( 09 Dec 2022 00:05 )  pH, Arterial: 7.40  pH, Blood: x     /  pCO2: 34    /  pO2: 153   / HCO3: 21    / Base Excess: -3.2  /  SaO2: 99.1

## 2022-12-09 NOTE — PROGRESS NOTE ADULT - PROBLEM SELECTOR PLAN 1
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

## 2022-12-09 NOTE — PROGRESS NOTE ADULT - SUBJECTIVE AND OBJECTIVE BOX
CRITICAL CARE ATTENDING - CTICU    MEDICATIONS  (STANDING):  aMIOdarone Infusion 0.5 mG/Min (16.7 mL/Hr) IV Continuous <Continuous>  argatroban Infusion 0.14 MICROgram(s)/kG/Min (0.78 mL/Hr) IV Continuous <Continuous>  atorvastatin 40 milliGRAM(s) Oral at bedtime  cefuroxime  IVPB 1500 milliGRAM(s) IV Intermittent every 24 hours  chlorhexidine 0.12% Liquid 15 milliLiter(s) Oral Mucosa every 12 hours  chlorhexidine 2% Cloths 1 Application(s) Topical <User Schedule>  CRRT Treatment    <Continuous>  dexMEDEtomidine Infusion 1 MICROgram(s)/kG/Hr (23.2 mL/Hr) IV Continuous <Continuous>  dextrose 50% Injectable 50 milliLiter(s) IV Push every 15 minutes  dextrose 50% Injectable 25 milliLiter(s) IV Push every 15 minutes  insulin regular Infusion 3 Unit(s)/Hr (3 mL/Hr) IV Continuous <Continuous>  pantoprazole  Injectable 40 milliGRAM(s) IV Push two times a day  Phoxillum Filtration BK 4 / 2.5 5000 milliLiter(s) (2000 mL/Hr) CRRT <Continuous>  polyethylene glycol 3350 17 Gram(s) Oral two times a day  PrismaSOL Filtration BGK 4 / 2.5 5000 milliLiter(s) (800 mL/Hr) CRRT <Continuous>  PrismaSOL Filtration BGK 4 / 2.5 5000 milliLiter(s) (200 mL/Hr) CRRT <Continuous>  senna 2 Tablet(s) Oral at bedtime  sodium chloride 0.9%. 1000 milliLiter(s) (10 mL/Hr) IV Continuous <Continuous>                                    8.5    13.43 )-----------( 151      ( 09 Dec 2022 00:19 )             25.7       12-    137  |  103  |  52<H>  ----------------------------<  146<H>  4.7   |  20<L>  |  3.10<H>    Ca    8.1<L>      09 Dec 2022 00:19  Phos  2.9     12-  Mg     3.0     12    TPro  6.0  /  Alb  3.0<L>  /  TBili  1.0  /  DBili  x   /  AST  56<H>  /  ALT  9<L>  /  AlkPhos  186<H>        PT/INR - ( 08 Dec 2022 10:58 )   PT: 13.5 sec;   INR: 1.16 ratio         PTT - ( 09 Dec 2022 00:19 )  PTT:40.5 sec    Mode: CPAP with PS  FiO2: 40  PEEP: 10  PS: 10  MAP: 14  PIP: 23      Daily Height in cm: 172.72 (08 Dec 2022 07:40)    Daily Weight in k.3 (09 Dec 2022 00:00)       @ 07:01  -   @ 07:00  --------------------------------------------------------  IN: 2516.9 mL / OUT: 929 mL / NET: 1587.9 mL        Critically Ill patient  : [ ] preoperative ,   [ ] post operative  [x] Non Operative    Requires :  [x ] Arterial Line   [ x] Central Line  [ ] PA catheter  [x] IABP  [ ] ECMO  [ ] LVAD  [x ] Ventilator  [ ] pacemaker [ ] Impella.                      [x ] ABG's     [x ] Pulse Oxymetry Monitoring  Bedside evaluation , monitoring , treatment of hemodynamics , fluids , IVP/ IVCD meds.        Diagnosis:      - [facility] - [facility]     ECMO Decannulation yesterday morning    POD 3- IABP placed in cath lab     MI     Acute Pancreatitis     ARDS     Hemodynamic lability,  instability. Requires IVCD [ x] vasopressors- currently off [ ] inotropes  [ ] vasodilator  [ x]IVSS fluid  to maintain MAP, perfusion, C.I.     Fluid  overload     Post Arrest Shock state    Hypotension     CHF- acute [ x]   chronic [ x]    systolic [ x]   diatolic [ ]          - Echo- EF -   30-40% Severe segmental LV systolic dysfunction          [ ] RV dysfunction     - Cxr-cardiomegally, edema          - Clinical-  [ ]inotropes   [x]pressors- currently off   [x ]diuresis   [x]IABP   [x]ECMO   [ ]LVAD   [x]Respiratory Failure.     Cardiogenic Shock - resolving    Renal Failure - Acute Kidney Injury     CVVHD     IVCD anticoagulation with [ ] Heparin  [x] Argatroban for  ECMO / MI    IABP   [x] management   [ ] wean 1:1 1:2 1:3   [ ] removal and f/u vascular checks     IVCD Insulin- DM    Requires bedside physical therapy, mobilization and total [facility] care.     IVCD Precedex  for vent synchrony     Respiratory Failure     Requires chest PT, pulmonary toilet, ambu bagging, suctioning to maintain SaO2,  patent airway and treat atelectasis.     Ventilator Management:  [x ]AC-rest    [ ]CPAP-PS Wean    [ ]Trach Collar     [ ]Extubate    [ ] T-Piece  [ ]peep>5     Difficult weaning process - multiple organ system involvement in critically ill patient     Cardioverted from A Flutter at 140/min to /min    Tolerates NG / NJ feeds at [x] goal rate - currently off [ ] trophic rate    [ ]       rate           By signing my name below, I, Maria D'Amico, attest that this documentation has been prepared under the direction and in the presence of Finn Zelaya MD.   Electronically Signed: Maria D'Amico, Scribe 22 @ 07:10      Discussed with CT surgeon, Physician Assistant - Nurse Practitioner- Critical care medicine team.   Discussed at  AM / PM rounds.   Chart, labs , films reviewed.    Cumulative Critical Care Time Given Today:  CRITICAL CARE ATTENDING - CTICU    MEDICATIONS  (STANDING):  aMIOdarone Infusion 0.5 mG/Min (16.7 mL/Hr) IV Continuous <Continuous>  argatroban Infusion 0.14 MICROgram(s)/kG/Min (0.78 mL/Hr) IV Continuous <Continuous>  atorvastatin 40 milliGRAM(s) Oral at bedtime  cefuroxime  IVPB 1500 milliGRAM(s) IV Intermittent every 24 hours  chlorhexidine 0.12% Liquid 15 milliLiter(s) Oral Mucosa every 12 hours  chlorhexidine 2% Cloths 1 Application(s) Topical <User Schedule>  CRRT Treatment    <Continuous>  dexMEDEtomidine Infusion 1 MICROgram(s)/kG/Hr (23.2 mL/Hr) IV Continuous <Continuous>  dextrose 50% Injectable 50 milliLiter(s) IV Push every 15 minutes  dextrose 50% Injectable 25 milliLiter(s) IV Push every 15 minutes  insulin regular Infusion 3 Unit(s)/Hr (3 mL/Hr) IV Continuous <Continuous>  pantoprazole  Injectable 40 milliGRAM(s) IV Push two times a day  Phoxillum Filtration BK 4 / 2.5 5000 milliLiter(s) (2000 mL/Hr) CRRT <Continuous>  polyethylene glycol 3350 17 Gram(s) Oral two times a day  PrismaSOL Filtration BGK 4 / 2.5 5000 milliLiter(s) (800 mL/Hr) CRRT <Continuous>  PrismaSOL Filtration BGK 4 / 2.5 5000 milliLiter(s) (200 mL/Hr) CRRT <Continuous>  senna 2 Tablet(s) Oral at bedtime  sodium chloride 0.9%. 1000 milliLiter(s) (10 mL/Hr) IV Continuous <Continuous>                                    8.5    13.43 )-----------( 151      ( 09 Dec 2022 00:19 )             25.7       12-    137  |  103  |  52<H>  ----------------------------<  146<H>  4.7   |  20<L>  |  3.10<H>    Ca    8.1<L>      09 Dec 2022 00:19  Phos  2.9     12-  Mg     3.0     12    TPro  6.0  /  Alb  3.0<L>  /  TBili  1.0  /  DBili  x   /  AST  56<H>  /  ALT  9<L>  /  AlkPhos  186<H>        PT/INR - ( 08 Dec 2022 10:58 )   PT: 13.5 sec;   INR: 1.16 ratio         PTT - ( 09 Dec 2022 00:19 )  PTT:40.5 sec    Mode: CPAP with PS  FiO2: 40  PEEP: 10  PS: 10  MAP: 14  PIP: 23      Daily Height in cm: 172.72 (08 Dec 2022 07:40)    Daily Weight in k.3 (09 Dec 2022 00:00)       @ 07:01  -   @ 07:00  --------------------------------------------------------  IN: 2516.9 mL / OUT: 929 mL / NET: 1587.9 mL        Critically Ill patient  : [ ] preoperative ,   [ ] post operative  [x] Non Operative    Requires :  [x ] Arterial Line   [ x] Central Line  [ ] PA catheter  [x] IABP  [ ] ECMO  [ ] LVAD  [x ] Ventilator  [ ] pacemaker [ ] Impella.                      [x ] ABG's     [x ] Pulse Oxymetry Monitoring  Bedside evaluation , monitoring , treatment of hemodynamics , fluids , IVP/ IVCD meds.        Diagnosis:      - [facility] - arrest in [facility]     ECMO Decannulation yesterday morning    POD 3- IABP placed in cath lab     MI     Acute Pancreatitis     ARDS     Hemodynamic lability,  instability. Requires IVCD [ x] vasopressors- currently off [ ] inotropes  [ ] vasodilator  [ x]IVSS fluid  to maintain MAP, perfusion, C.I.     Fluid  overload     Post Arrest Shock state    Hypotension     CHF- acute [ x]   chronic [ x]    systolic [ x]   diatolic [ ]          - Echo- EF -   30-40% Severe segmental LV systolic dysfunction          [ ] RV dysfunction     - Cxr-cardiomegally, edema          - Clinical-  [ ]inotropes   [x]pressors- currently off   [x ]diuresis   [x]IABP   [x]ECMO   [ ]LVAD   [x]Respiratory Failure.     Cardiogenic Shock - resolving    Renal Failure - Acute Kidney Injury     CVVHD     IVCD anticoagulation with [ ] Heparin  [x] Argatroban for  ECMO / MI    IABP   [x] management   [ ] wean 1:1 1:2 1:3   [ ] removal and f/u vascular checks     IVCD Insulin- DM    Requires bedside physical therapy, mobilization and total [facility] care.     IVCD Precedex  for vent synchrony     Respiratory Failure     Requires chest PT, pulmonary toilet, ambu bagging, suctioning to maintain SaO2,  patent airway and treat atelectasis.     Ventilator Management:  [x ]AC-rest    [ ]CPAP-PS Wean    [ ]Trach Collar     [ ]Extubate    [ ] T-Piece  [ ]peep>5     Difficult weaning process - multiple organ system involvement in critically ill patient     Cardioverted from A Flutter at 140/min to /min    Tolerates NG / NJ feeds at [x] goal rate - currently off [ ] trophic rate    [ ]       rate           By signing my name below, I, Maria D'Amico, attest that this documentation has been prepared under the direction and in the presence of Finn Zelaya MD.   Electronically Signed: Maria D'Amico, Scribe 22 @ 07:10      Discussed with CT surgeon, Physician Assistant - Nurse Practitioner- Critical care medicine team.   Discussed at  AM / PM rounds.   Chart, labs , films reviewed.    Cumulative Critical Care Time Given Today:  CRITICAL CARE ATTENDING - CTICU    MEDICATIONS  (STANDING):  aMIOdarone Infusion 0.5 mG/Min (16.7 mL/Hr) IV Continuous <Continuous>  argatroban Infusion 0.14 MICROgram(s)/kG/Min (0.78 mL/Hr) IV Continuous <Continuous>  atorvastatin 40 milliGRAM(s) Oral at bedtime  cefuroxime  IVPB 1500 milliGRAM(s) IV Intermittent every 24 hours  chlorhexidine 0.12% Liquid 15 milliLiter(s) Oral Mucosa every 12 hours  chlorhexidine 2% Cloths 1 Application(s) Topical <User Schedule>  CRRT Treatment    <Continuous>  dexMEDEtomidine Infusion 1 MICROgram(s)/kG/Hr (23.2 mL/Hr) IV Continuous <Continuous>  dextrose 50% Injectable 50 milliLiter(s) IV Push every 15 minutes  dextrose 50% Injectable 25 milliLiter(s) IV Push every 15 minutes  insulin regular Infusion 3 Unit(s)/Hr (3 mL/Hr) IV Continuous <Continuous>  pantoprazole  Injectable 40 milliGRAM(s) IV Push two times a day  Phoxillum Filtration BK 4 / 2.5 5000 milliLiter(s) (2000 mL/Hr) CRRT <Continuous>  polyethylene glycol 3350 17 Gram(s) Oral two times a day  PrismaSOL Filtration BGK 4 / 2.5 5000 milliLiter(s) (800 mL/Hr) CRRT <Continuous>  PrismaSOL Filtration BGK 4 / 2.5 5000 milliLiter(s) (200 mL/Hr) CRRT <Continuous>  senna 2 Tablet(s) Oral at bedtime  sodium chloride 0.9%. 1000 milliLiter(s) (10 mL/Hr) IV Continuous <Continuous>                                    8.5    13.43 )-----------( 151      ( 09 Dec 2022 00:19 )             25.7       12-    137  |  103  |  52<H>  ----------------------------<  146<H>  4.7   |  20<L>  |  3.10<H>    Ca    8.1<L>      09 Dec 2022 00:19  Phos  2.9     12-  Mg     3.0     12    TPro  6.0  /  Alb  3.0<L>  /  TBili  1.0  /  DBili  x   /  AST  56<H>  /  ALT  9<L>  /  AlkPhos  186<H>        PT/INR - ( 08 Dec 2022 10:58 )   PT: 13.5 sec;   INR: 1.16 ratio         PTT - ( 09 Dec 2022 00:19 )  PTT:40.5 sec    Mode: CPAP with PS  FiO2: 40  PEEP: 10  PS: 10  MAP: 14  PIP: 23      Daily Height in cm: 172.72 (08 Dec 2022 07:40)    Daily Weight in k.3 (09 Dec 2022 00:00)       @ 07:01  -   @ 07:00  --------------------------------------------------------  IN: 2516.9 mL / OUT: 929 mL / NET: 1587.9 mL        Critically Ill patient  : [ ] preoperative ,   [ ] post operative  [x] Non Operative    Requires :  [x ] Arterial Line   [ x] Central Line  [ ] PA catheter  [x] IABP  [ ] ECMO  [ ] LVAD  [x ] Ventilator  [ ] pacemaker [ ] Impella.                      [x ] ABG's     [x ] Pulse Oxymetry Monitoring  Bedside evaluation , monitoring , treatment of hemodynamics , fluids , IVP/ IVCD meds.        Diagnosis:      - [facility] - arrest in [facility]     ECMO Decannulation yesterday morning    POD 3- IABP placed in cath lab     MI     Acute Pancreatitis     ARDS     Hemodynamic lability,  instability. Requires IVCD [ x] vasopressors- currently off [ ] inotropes  [ ] vasodilator  [ x]IVSS fluid  to maintain MAP, perfusion, C.I.     Fluid  overload     Post Arrest Shock state    Hypotension     CHF- acute [ x]   chronic [ x]    systolic [ x]   diatolic [ ]          - Echo- EF -   30-40% Severe segmental LV systolic dysfunction          [ ] RV dysfunction     - Cxr-cardiomegally, edema          - Clinical-  [ ]inotropes   [x]pressors- currently off   [x ]diuresis   [x]IABP   [x]ECMO   [ ]LVAD   [x]Respiratory Failure.     Cardiogenic Shock - resolving    Renal Failure - Acute Kidney Injury     CVVHD     IVCD anticoagulation with [ ] Heparin  [x] Argatroban for  ECMO / MI    IABP   [x] management   [ ] wean 1:1 1:2 1:3   [ ] removal and f/u vascular checks     IVCD Insulin- DM    Requires bedside physical therapy, mobilization and [facility]     IVCD Precedex  for vent synchrony     Respiratory Failure     Requires chest PT, pulmonary toilet, ambu bagging, suctioning to maintain SaO2,  patent airway and treat atelectasis.     Ventilator Management:  [x ]AC-rest    [ ]CPAP-PS Wean    [ ]Trach Collar     [ ]Extubate    [ ] T-Piece  [ ]peep>5     Difficult weaning process - multiple organ system involvement in critically ill patient     Cardioverted from A Flutter at 140/min to /min    Tolerates NG / NJ feeds at [x] goal rate - currently off [ ] trophic rate    [ ]       rate           By signing my name below, I, Maria D'Amico, attest that this documentation has been prepared under the direction and in the presence of Finn Zelaya MD.   Electronically Signed: Maria D'Amico, Scribe 22 @ 07:10      Discussed with CT surgeon, Physician Assistant - Nurse Practitioner- Critical care medicine team.   Discussed at  AM / PM rounds.   Chart, labs , films reviewed.    Cumulative Critical Care Time Given Today:  CRITICAL CARE ATTENDING - CTICU    MEDICATIONS  (STANDING):  aMIOdarone Infusion 0.5 mG/Min (16.7 mL/Hr) IV Continuous <Continuous>  argatroban Infusion 0.14 MICROgram(s)/kG/Min (0.78 mL/Hr) IV Continuous <Continuous>  atorvastatin 40 milliGRAM(s) Oral at bedtime  cefuroxime  IVPB 1500 milliGRAM(s) IV Intermittent every 24 hours  chlorhexidine 0.12% Liquid 15 milliLiter(s) Oral Mucosa every 12 hours  chlorhexidine 2% Cloths 1 Application(s) Topical <User Schedule>  CRRT Treatment    <Continuous>  dexMEDEtomidine Infusion 1 MICROgram(s)/kG/Hr (23.2 mL/Hr) IV Continuous <Continuous>  dextrose 50% Injectable 50 milliLiter(s) IV Push every 15 minutes  dextrose 50% Injectable 25 milliLiter(s) IV Push every 15 minutes  insulin regular Infusion 3 Unit(s)/Hr (3 mL/Hr) IV Continuous <Continuous>  pantoprazole  Injectable 40 milliGRAM(s) IV Push two times a day  Phoxillum Filtration BK 4 / 2.5 5000 milliLiter(s) (2000 mL/Hr) CRRT <Continuous>  polyethylene glycol 3350 17 Gram(s) Oral two times a day  PrismaSOL Filtration BGK 4 / 2.5 5000 milliLiter(s) (800 mL/Hr) CRRT <Continuous>  PrismaSOL Filtration BGK 4 / 2.5 5000 milliLiter(s) (200 mL/Hr) CRRT <Continuous>  senna 2 Tablet(s) Oral at bedtime  sodium chloride 0.9%. 1000 milliLiter(s) (10 mL/Hr) IV Continuous <Continuous>                                    8.5    13.43 )-----------( 151      ( 09 Dec 2022 00:19 )             25.7       12-    137  |  103  |  52<H>  ----------------------------<  146<H>  4.7   |  20<L>  |  3.10<H>    Ca    8.1<L>      09 Dec 2022 00:19  Phos  2.9     12-  Mg     3.0     12    TPro  6.0  /  Alb  3.0<L>  /  TBili  1.0  /  DBili  x   /  AST  56<H>  /  ALT  9<L>  /  AlkPhos  186<H>        PT/INR - ( 08 Dec 2022 10:58 )   PT: 13.5 sec;   INR: 1.16 ratio         PTT - ( 09 Dec 2022 00:19 )  PTT:40.5 sec    Mode: CPAP with PS  FiO2: 40  PEEP: 10  PS: 10  MAP: 14  PIP: 23      Daily Height in cm: 172.72 (08 Dec 2022 07:40)    Daily Weight in k.3 (09 Dec 2022 00:00)       @ 07:01  -   @ 07:00  --------------------------------------------------------  IN: 2516.9 mL / OUT: 929 mL / NET: 1587.9 mL        Critically Ill patient  : [ ] preoperative ,   [ ] post operative  [x] Non Operative    Requires :  [x ] Arterial Line   [ x] Central Line  [ ] PA catheter  [x] IABP  [ ] ECMO  [ ] LVAD  [x ] Ventilator  [ ] pacemaker [ ] Impella.                      [x ] ABG's     [x ] Pulse Oxymetry Monitoring  Bedside evaluation , monitoring , treatment of hemodynamics , fluids , IVP/ IVCD meds.        Diagnosis:      - [facility] - arrest in SICU     ECMO Decannulation yesterday morning    POD 3- IABP placed in cath lab     MI     Acute Pancreatitis     ARDS     Hemodynamic lability,  instability. Requires IVCD [ x] vasopressors- currently off [ ] inotropes  [ ] vasodilator  [ x]IVSS fluid  to maintain MAP, perfusion, C.I.     Fluid  overload     Post Arrest Shock state    Hypotension     CHF- acute [ x]   chronic [ x]    systolic [ x]   diatolic [ ]          - Echo- EF -   30-40% Severe segmental LV systolic dysfunction          [ ] RV dysfunction     - Cxr-cardiomegally, edema          - Clinical-  [ ]inotropes   [x]pressors- currently off   [x ]diuresis   [x]IABP      [ ]LVAD   [x]Respiratory Failure.     Cardiogenic Shock - resolving    Renal Failure - Acute Kidney Injury     CVVHD     IVCD anticoagulation with [ ] Heparin  [x] Argatroban for  ECMO / MI    IABP   [x] management   [ ] wean 1:1 1:2 1:3   [ ] removal and f/u vascular checks     IVCD Insulin- DM    Requires bedside physical therapy, mobilization and total longterm care.     IVCD Precedex  for vent synchrony     Respiratory Failure     Requires chest PT, pulmonary toilet, ambu bagging, suctioning to maintain SaO2,  patent airway and treat atelectasis.     Ventilator Management:  [x ]AC-rest    [ x]CPAP-PS Wean    [ ]Trach Collar     [ ]Extubate    [ ] T-Piece  [ ]peep>5     Difficult weaning process - multiple organ system involvement in critically ill patient     Cardioverted from A Flutter at 140/min to /min    Tolerates NG / NJ feeds at [x] goal rate - currently off [ ] trophic rate    [ ]       rate           By signing my name below, I, Maria D'Amico, attest that this documentation has been prepared under the direction and in the presence of Finn Zelaya MD.   Electronically Signed: Maria D'Amico, Scribe 22 @ 07:10    IFinn, personally performed the services described in this documentation. All medical record entries made by the scribe were at my direction and in my presence. I have reviewed the chart and agree that the record reflects my personal performance and is accurate and complete.   Finn Zelaya MD.       Discussed with CT surgeon, Physician Assistant - Nurse Practitioner- Critical care medicine team.   Discussed at  AM / PM rounds.   Chart, labs , films reviewed.    Cumulative Critical Care Time Given Today:  90 min CRITICAL CARE ATTENDING - CTICU    MEDICATIONS  (STANDING):  aMIOdarone Infusion 0.5 mG/Min (16.7 mL/Hr) IV Continuous <Continuous>  argatroban Infusion 0.14 MICROgram(s)/kG/Min (0.78 mL/Hr) IV Continuous <Continuous>  atorvastatin 40 milliGRAM(s) Oral at bedtime  cefuroxime  IVPB 1500 milliGRAM(s) IV Intermittent every 24 hours  chlorhexidine 0.12% Liquid 15 milliLiter(s) Oral Mucosa every 12 hours  chlorhexidine 2% Cloths 1 Application(s) Topical <User Schedule>  CRRT Treatment    <Continuous>  dexMEDEtomidine Infusion 1 MICROgram(s)/kG/Hr (23.2 mL/Hr) IV Continuous <Continuous>  dextrose 50% Injectable 50 milliLiter(s) IV Push every 15 minutes  dextrose 50% Injectable 25 milliLiter(s) IV Push every 15 minutes  insulin regular Infusion 3 Unit(s)/Hr (3 mL/Hr) IV Continuous <Continuous>  pantoprazole  Injectable 40 milliGRAM(s) IV Push two times a day  Phoxillum Filtration BK 4 / 2.5 5000 milliLiter(s) (2000 mL/Hr) CRRT <Continuous>  polyethylene glycol 3350 17 Gram(s) Oral two times a day  PrismaSOL Filtration BGK 4 / 2.5 5000 milliLiter(s) (800 mL/Hr) CRRT <Continuous>  PrismaSOL Filtration BGK 4 / 2.5 5000 milliLiter(s) (200 mL/Hr) CRRT <Continuous>  senna 2 Tablet(s) Oral at bedtime  sodium chloride 0.9%. 1000 milliLiter(s) (10 mL/Hr) IV Continuous <Continuous>                                    8.5    13.43 )-----------( 151      ( 09 Dec 2022 00:19 )             25.7       12-    137  |  103  |  52<H>  ----------------------------<  146<H>  4.7   |  20<L>  |  3.10<H>    Ca    8.1<L>      09 Dec 2022 00:19  Phos  2.9     12-  Mg     3.0     12    TPro  6.0  /  Alb  3.0<L>  /  TBili  1.0  /  DBili  x   /  AST  56<H>  /  ALT  9<L>  /  AlkPhos  186<H>        PT/INR - ( 08 Dec 2022 10:58 )   PT: 13.5 sec;   INR: 1.16 ratio         PTT - ( 09 Dec 2022 00:19 )  PTT:40.5 sec    Mode: CPAP with PS  FiO2: 40  PEEP: 10  PS: 10  MAP: 14  PIP: 23      Daily Height in cm: 172.72 (08 Dec 2022 07:40)    Daily Weight in k.3 (09 Dec 2022 00:00)       @ 07:01  -   @ 07:00  --------------------------------------------------------  IN: 2516.9 mL / OUT: 929 mL / NET: 1587.9 mL        Critically Ill patient  : [ ] preoperative ,   [ ] post operative  [x] Non Operative    Requires :  [x ] Arterial Line   [ x] Central Line  [ ] PA catheter  [x] IABP  [ ] ECMO  [ ] LVAD  [x ] Ventilator  [ ] pacemaker [ ] Impella.                      [x ] ABG's     [x ] Pulse Oxymetry Monitoring  Bedside evaluation , monitoring , treatment of hemodynamics , fluids , IVP/ IVCD meds.        Diagnosis:      - [facility] - arrest in SICU     ECMO Decannulation yesterday morning    POD 3- IABP placed in cath lab     MI     Acute Pancreatitis     ARDS     Hemodynamic lability,  instability. Requires IVCD [ x] vasopressors- currently off [ ] inotropes  [ ] vasodilator  [ x]IVSS fluid  to maintain MAP, perfusion, C.I.     Fluid  overload     Post Arrest Shock state    Hypotension     CHF- acute [ x]   chronic [ x]    systolic [ x]   diatolic [ ]          - Echo- EF -   30-40% Severe segmental LV systolic dysfunction          [ ] RV dysfunction     - Cxr-cardiomegally, edema          - Clinical-  [ ]inotropes   [x]pressors- currently off   [x ]diuresis   [x]IABP      [ ]LVAD   [x]Respiratory Failure.     Cardiogenic Shock - resolving    Renal Failure - Acute Kidney Injury     CVVHD     IVCD anticoagulation with [ ] Heparin  [x] Argatroban for  ECMO / MI    IABP   [x] management   [ ] wean 1:1 1:2 1:3   [ ] removal and f/u vascular checks     IVCD Insulin- DM    Requires bedside physical therapy, mobilization and total [facility] care.     IVCD Precedex  for vent synchrony     Respiratory Failure     Requires chest PT, pulmonary toilet, ambu bagging, suctioning to maintain SaO2,  patent airway and treat atelectasis.     Ventilator Management:  [x ]AC-rest    [ x]CPAP-PS Wean    [ ]Trach Collar     [ ]Extubate    [ ] T-Piece  [ ]peep>5     Difficult weaning process - multiple organ system involvement in critically ill patient     Cardioverted from A Flutter at 140/min to /min    Tolerates NG / [place] feeds at [x] goal rate - currently off [ ] trophic rate    [ ]       rate           By signing my name below, I, Maria D'Amico, attest that this documentation has been prepared under the direction and in the presence of Finn Zelaya MD.   Electronically Signed: Maria D'Amico, Scribe 22 @ 07:10    IFinn, personally performed the services described in this documentation. All medical record entries made by the scribe were at my direction and in my presence. I have reviewed the chart and agree that the record reflects my personal performance and is accurate and complete.   Finn Zelaya MD.       Discussed with CT surgeon, Physician Assistant - Nurse Practitioner- Critical care medicine team.   Discussed at  AM / PM rounds.   Chart, labs , films reviewed.    Cumulative Critical Care Time Given Today:  90 min CRITICAL CARE ATTENDING - CTICU    MEDICATIONS  (STANDING):  aMIOdarone Infusion 0.5 mG/Min (16.7 mL/Hr) IV Continuous <Continuous>  argatroban Infusion 0.14 MICROgram(s)/kG/Min (0.78 mL/Hr) IV Continuous <Continuous>  atorvastatin 40 milliGRAM(s) Oral at bedtime  cefuroxime  IVPB 1500 milliGRAM(s) IV Intermittent every 24 hours  chlorhexidine 0.12% Liquid 15 milliLiter(s) Oral Mucosa every 12 hours  chlorhexidine 2% Cloths 1 Application(s) Topical <User Schedule>  CRRT Treatment    <Continuous>  dexMEDEtomidine Infusion 1 MICROgram(s)/kG/Hr (23.2 mL/Hr) IV Continuous <Continuous>  dextrose 50% Injectable 50 milliLiter(s) IV Push every 15 minutes  dextrose 50% Injectable 25 milliLiter(s) IV Push every 15 minutes  insulin regular Infusion 3 Unit(s)/Hr (3 mL/Hr) IV Continuous <Continuous>  pantoprazole  Injectable 40 milliGRAM(s) IV Push two times a day  Phoxillum Filtration BK 4 / 2.5 5000 milliLiter(s) (2000 mL/Hr) CRRT <Continuous>  polyethylene glycol 3350 17 Gram(s) Oral two times a day  PrismaSOL Filtration BGK 4 / 2.5 5000 milliLiter(s) (800 mL/Hr) CRRT <Continuous>  PrismaSOL Filtration BGK 4 / 2.5 5000 milliLiter(s) (200 mL/Hr) CRRT <Continuous>  senna 2 Tablet(s) Oral at bedtime  sodium chloride 0.9%. 1000 milliLiter(s) (10 mL/Hr) IV Continuous <Continuous>                                    8.5    13.43 )-----------( 151      ( 09 Dec 2022 00:19 )             25.7       12-    137  |  103  |  52<H>  ----------------------------<  146<H>  4.7   |  20<L>  |  3.10<H>    Ca    8.1<L>      09 Dec 2022 00:19  Phos  2.9     12-  Mg     3.0     12    TPro  6.0  /  Alb  3.0<L>  /  TBili  1.0  /  DBili  x   /  AST  56<H>  /  ALT  9<L>  /  AlkPhos  186<H>        PT/INR - ( 08 Dec 2022 10:58 )   PT: 13.5 sec;   INR: 1.16 ratio         PTT - ( 09 Dec 2022 00:19 )  PTT:40.5 sec    Mode: CPAP with PS  FiO2: 40  PEEP: 10  PS: 10  MAP: 14  PIP: 23      Daily Height in cm: 172.72 (08 Dec 2022 07:40)    Daily Weight in k.3 (09 Dec 2022 00:00)       @ 07:01  -   @ 07:00  --------------------------------------------------------  IN: 2516.9 mL / OUT: 929 mL / NET: 1587.9 mL        Critically Ill patient  : [ ] preoperative ,   [ ] post operative  [x] Non Operative    Requires :  [x ] Arterial Line   [ x] Central Line  [ ] PA catheter  [x] IABP  [ ] ECMO  [ ] LVAD  [x ] Ventilator  [ ] pacemaker [ ] Impella.                      [x ] ABG's     [x ] Pulse Oxymetry Monitoring  Bedside evaluation , monitoring , treatment of hemodynamics , fluids , IVP/ IVCD meds.        Diagnosis:      - [facility] ECMO - arrest in SICU     ECMO Decannulation yesterday morning    POD 3- IABP placed in cath lab     MI     Acute Pancreatitis     ARDS     Hemodynamic lability,  instability. Requires IVCD [ x] vasopressors- currently off [ ] inotropes  [ ] vasodilator  [ x]IVSS fluid  to maintain MAP, perfusion, C.I.     Fluid  overload     Post Arrest Shock state    Hypotension     CHF- acute [ x]   chronic [ x]    systolic [ x]   diatolic [ ]          - Echo- EF -   30-40% Severe segmental LV systolic dysfunction          [ ] RV dysfunction     - Cxr-cardiomegally, edema          - Clinical-  [ ]inotropes   [x]pressors- currently off   [x ]diuresis   [x]IABP      [ ]LVAD   [x]Respiratory Failure.     Cardiogenic Shock - resolving    Renal Failure - Acute Kidney Injury     CVVHD     IVCD anticoagulation with [ ] Heparin  [x] Argatroban for  ECMO / MI    IABP   [x] management   [ ] wean 1:1 1:2 1:3   [ ] removal and f/u vascular checks     IVCD Insulin- DM    Requires bedside physical therapy, mobilization and total care home care.     IVCD Precedex  for vent synchrony     Respiratory Failure     Requires chest PT, pulmonary toilet, ambu bagging, suctioning to maintain SaO2,  patent airway and treat atelectasis.     Ventilator Management:  [x ]AC-rest    [ x]CPAP-PS Wean    [ ]Trach Collar     [ ]Extubate    [ ] T-Piece  [ ]peep>5     Difficult weaning process - multiple organ system involvement in critically ill patient     Cardioverted from A Flutter at 140/min to /min    Tolerates NG / NJ feeds at [x] goal rate - currently off [ ] trophic rate    [ ]       rate           By signing my name below, I, Maria D'Amico, attest that this documentation has been prepared under the direction and in the presence of Finn Zelaya MD.   Electronically Signed: Maria D'Amico, Scribe 22 @ 07:10    IFinn, personally performed the services described in this documentation. All medical record entries made by the scribe were at my direction and in my presence. I have reviewed the chart and agree that the record reflects my personal performance and is accurate and complete.   Finn Zelaya MD.       Discussed with CT surgeon, Physician Assistant - Nurse Practitioner- Critical care medicine team.   Discussed at  AM / PM rounds.   Chart, labs , films reviewed.    Cumulative Critical Care Time Given Today:  90 min

## 2022-12-09 NOTE — PROGRESS NOTE ADULT - SUBJECTIVE AND OBJECTIVE BOX
Patient seen and examined at the bedside.    Remained critically ill on continuous ICU monitoring.    HPI:  Patient is a 61 y/o male with PMH of CABG, DM, HTN, HLD presenting as transfer from [place] for c/f STEMI. PTA arrival received 325 aspirin, 600 plavix, and no heparin drip started. Around 1:30 am patient had multiple episodes of non-bloody diarrhea and emesis with associated abdominal pain and chest pain, unable to localize, non-radiating, with associated SOB and no associated palpitations or diaphoresis. No recent fevers/chills, cough, rashes, or changes in urination. Does report mild diffuse back pain; started 5 days ago in setting on injury while walking and lifting objects. Denies focal weakness, numbness/tingling, or LOC due does report mild dizziness.    Patient seen and examined in ED. Hemodynamically stable, alert and awake, A&Ox3. Daughter at bedside. Reports abdominal pain, and nausea. Abdomen is soft, tender in epigastric area and RUQ. Denies chest pain, SOB. WBC 20, T.bili 3.4, Lipase 300. RUQ US demonstrated gallbladder stones, pericholecystic fluid.  (24 Nov 2022 15:10)      =================== LABS =========================                        9.0    22.65 )-----------( 211      ( 09 Dec 2022 12:23 )             27.1     12-09    133<L>  |  100  |  40<H>  ----------------------------<  252<H>  4.7   |  18<L>  |  2.29<H>    Ca    8.1<L>      09 Dec 2022 12:23  Phos  2.9     12-09  Mg     3.0     12-09    TPro  6.2  /  Alb  2.9<L>  /  TBili  1.1  /  DBili  x   /  AST  57<H>  /  ALT  14  /  AlkPhos  186<H>  12-09    LIVER FUNCTIONS - ( 09 Dec 2022 12:23 )  Alb: 2.9 g/dL / Pro: 6.2 g/dL / ALK PHOS: 186 U/L / ALT: 14 U/L / AST: 57 U/L / GGT: x           PT/INR - ( 08 Dec 2022 10:58 )   PT: 13.5 sec;   INR: 1.16 ratio         PTT - ( 09 Dec 2022 17:32 )  PTT:42.5 sec  ABG - ( 09 Dec 2022 14:50 )  pH, Arterial: 7.41  pH, Blood: x     /  pCO2: 31    /  pO2: 134   / HCO3: 20    / Base Excess: -4.4  /  SaO2: 98.6                  ==================================================    OBJECTIVE:  Vital Signs Last 24 Hrs  T(C): 38.3 (09 Dec 2022 19:00), Max: 38.3 (09 Dec 2022 19:00)  T(F): 100.9 (09 Dec 2022 19:00), Max: 100.9 (09 Dec 2022 19:00)  HR: 118 (09 Dec 2022 20:00) (76 - 120)  BP: --  BP(mean): --  RR: 27 (09 Dec 2022 20:00) (1 - 27)  SpO2: 100% (09 Dec 2022 20:00) (100% - 100%)    Parameters below as of 09 Dec 2022 19:00  Patient On (Oxygen Delivery Method): ventilator    O2 Concentration (%): 40    REVIEW OF SYSTEMS:  [x ] N/A    Physical Exam:  General: Intubated, multiple lines gtt  Neurology: Sedated  Eyes: bilateral pupils equal and reactive   ENT/Neck: +ETT midline, Neck supple, trachea midline, No JVD   Respiratory: rhonchi bilaterally   CV: S1S2, no murmurs        [x] Sinus Tachycardia   Abdominal: Softly distended,+BS   Extremities: 1+ pedal edema noted, + peripheral pulses palpable, warm  Skin: No Rashes, Hematoma, Ecchymosis                Assessment:  61 y/o male with PMH of CABG, DM, HTN, HLD presenting as transfer from [place] for c/f STEMI. PTA arrival received 325 aspirin, 600 plavix, and no heparin drip started. Around 1:30 am patient had multiple episodes of non-bloody diarrhea and emesis with associated abdominal pain and chest pain, unable to localize, non-radiating, with associated SOB and no associated palpitations or diaphoresis. No recent fevers/chills, cough, rashes, or changes in urination. Does report mild diffuse back pain; started 5 days ago in setting on injury while walking and lifting objects. Denies focal weakness, numbness/tingling, or LOC due does report mild dizziness.    acute rspiratory distress , cardiogenic shock s/p [facility] ECMO 11/25  Hemodynamic instability  Hypovolemia  Leukocytosis   Acute blood loss anemia  acute pancreatitis          Plan:   ***Neuro***  CT head showed 4mm subdural hematoma 11/26: repeat CT head unchanged 12/01  [x] Sedated with [x] Precedex for vent synchrony  Post operative neuro assessment   Soft palate laceration, ENT scoped 12/8, stable, no acute intervention at this time      ***Cardiovascular***  12/7 TTE turndown EF 30-40%, normal RV, MR mod   Invasive hemodynamic monitoring, assess perfusion indices   ST / CVP 7/ MAP 63/ SvO2 ___ / Hct 27.1/ Lactate 1.9  [x] Primacor- 0.2 mcgs/kg/min for afterload reduction and LV support   [x] Amiodarone- 0.5 mg/min for afib prophylaxis /rate control  Vasopressin currently off   [x] 12/7 cath patent LIMA graft and other grafts occluded, femoral IABP placed, with good augmentation @ 1:1, plan for removal tomorrow    [x] 12/8 ECMO d/c'd  Reassessment of hemodynamics post resuscitation   12/8 Aflutter s/p DCCV x2  [x] AC Therapy with Argatroban gtt pAF/flutter  [x] Statin   11/29 HIT NG, MARY pending, f/u result      ***Pulmonary***  Post op vent management   Titration of FiO2 and PEEP, follow SpO2, CXR, blood gasses   CPAP this morning--> Held off on extubating   Plan for extubation over the weekend/ Monday        Mode: AC/ CMV (Assist Control/ Continuous Mandatory Ventilation)  RR (machine): 14  FiO2: 40  PEEP: 5  ITime: 1  MAP: 11  PC: 18  PIP: 24                  ***GI***  [x] NPO with TF's Glucerna 1.5 @goal of 60cc/hr   [x] Protonix    Bowel regimen with Miralax and senna    ***Renal***  ERIC, renal following CVVHD started 12/5, held post procedure today, will re-evaluate resuming, currently no acute indication tonight  CVP goal 10-12  Continue to monitor I/Os, BUN/Creatinine.   Replete lytes PRN  De Dios present      ***ID***  Continue Cefuroxime for perioperative antibiotic coverage  Febrile this morning, given Vanco x1     ***Endocrine***  [x]  DM2: HbA1c 7.3%                - [x] Insulin gtt              - Need tight glycemic control to prevent wound infection.      Patient requires continuous monitoring with bedside rhythm monitoring, pulse oximetry monitoring, and continuous central venous and arterial pressure monitoring; and intermittent blood gas analysis. Care plan discussed with the ICU care team.   Patient remained critical, at risk for life threatening decompensation.    By signing my name below, IAbhijit, attest that this documentation has been prepared under the direction and in the presence of Kylah Chamorro NP   Electronically signed: Jolly Slaughter, 12-09-22 @ 20:15    I, yKlah Chamorro, personally performed the services described in this documentation. all medical record entries made by the jolly were at my direction and in my presence. I have reviewed the chart and agree that the record reflects my personal performance and is accurate and complete  Electronically signed: Kylah Chamorro NP  Patient seen and examined at the bedside.    Remained critically ill on continuous ICU monitoring.    HPI:  Patient is a 61 y/o male with PMH of CABG, DM, HTN, HLD presenting as transfer from [place] for c/f STEMI. PTA arrival received 325 aspirin, 600 plavix, and no heparin drip started. Around 1:30 am patient had multiple episodes of non-bloody diarrhea and emesis with associated abdominal pain and chest pain, unable to localize, non-radiating, with associated SOB and no associated palpitations or diaphoresis. No recent fevers/chills, cough, rashes, or changes in urination. Does report mild diffuse back pain; started 5 days ago in setting on injury while walking and lifting objects. Denies focal weakness, numbness/tingling, or LOC due does report mild dizziness.    Patient seen and examined in ED. Hemodynamically stable, alert and awake, A&Ox3. Daughter at bedside. Reports abdominal pain, and nausea. Abdomen is soft, tender in epigastric area and RUQ. Denies chest pain, SOB. WBC 20, T.bili 3.4, Lipase 300. RUQ US demonstrated gallbladder stones, pericholecystic fluid.  (24 Nov 2022 15:10)      =================== LABS =========================                        9.0    22.65 )-----------( 211      ( 09 Dec 2022 12:23 )             27.1     12-09    133<L>  |  100  |  40<H>  ----------------------------<  252<H>  4.7   |  18<L>  |  2.29<H>    Ca    8.1<L>      09 Dec 2022 12:23  Phos  2.9     12-09  Mg     3.0     12-09    TPro  6.2  /  Alb  2.9<L>  /  TBili  1.1  /  DBili  x   /  AST  57<H>  /  ALT  14  /  AlkPhos  186<H>  12-09    LIVER FUNCTIONS - ( 09 Dec 2022 12:23 )  Alb: 2.9 g/dL / Pro: 6.2 g/dL / ALK PHOS: 186 U/L / ALT: 14 U/L / AST: 57 U/L / GGT: x           PT/INR - ( 08 Dec 2022 10:58 )   PT: 13.5 sec;   INR: 1.16 ratio         PTT - ( 09 Dec 2022 17:32 )  PTT:42.5 sec  ABG - ( 09 Dec 2022 14:50 )  pH, Arterial: 7.41  pH, Blood: x     /  pCO2: 31    /  pO2: 134   / HCO3: 20    / Base Excess: -4.4  /  SaO2: 98.6                  ==================================================    OBJECTIVE:  Vital Signs Last 24 Hrs  T(C): 38.3 (09 Dec 2022 19:00), Max: 38.3 (09 Dec 2022 19:00)  T(F): 100.9 (09 Dec 2022 19:00), Max: 100.9 (09 Dec 2022 19:00)  HR: 118 (09 Dec 2022 20:00) (76 - 120)  BP: --  BP(mean): --  RR: 27 (09 Dec 2022 20:00) (1 - 27)  SpO2: 100% (09 Dec 2022 20:00) (100% - 100%)    Parameters below as of 09 Dec 2022 19:00  Patient On (Oxygen Delivery Method): ventilator    O2 Concentration (%): 40    REVIEW OF SYSTEMS:  [x ] N/A    Physical Exam:  General: Intubated, multiple lines gtt  Neurology: Sedated  Eyes: bilateral pupils equal and reactive   ENT/Neck: +ETT midline, Neck supple, trachea midline, No JVD   Respiratory: rhonchi bilaterally   CV: S1S2, no murmurs        [x] Sinus Tachycardia   Abdominal: Softly distended,+BS   Extremities: 1+ pedal edema noted, + peripheral pulses palpable, warm  Skin: No Rashes, Hematoma, Ecchymosis                Assessment:  61 y/o male with PMH of CABG, DM, HTN, HLD presenting as transfer from [place] for c/f STEMI. PTA arrival received 325 aspirin, 600 plavix, and no heparin drip started. Around 1:30 am patient had multiple episodes of non-bloody diarrhea and emesis with associated abdominal pain and chest pain, unable to localize, non-radiating, with associated SOB and no associated palpitations or diaphoresis. No recent fevers/chills, cough, rashes, or changes in urination. Does report mild diffuse back pain; started 5 days ago in setting on injury while walking and lifting objects. Denies focal weakness, numbness/tingling, or LOC due does report mild dizziness.    acute rspiratory distress , cardiogenic shock s/p [facility] ECMO 11/25  Hemodynamic instability  Hypovolemia  Leukocytosis   Acute blood loss anemia  acute pancreatitis          Plan:   ***Neuro***  CT head showed 4mm subdural hematoma 11/26: repeat CT head unchanged 12/01  [x] Sedated with [x] Precedex for vent synchrony  Post operative neuro assessment   Soft palate laceration, ENT scoped 12/8, stable, no acute intervention at this time      ***Cardiovascular***  12/7 TTE turndown EF 30-40%, normal RV, MR mod   Invasive hemodynamic monitoring, assess perfusion indices   ST / CVP 7/ MAP 63/ SvO2 ___ / Hct 27.1/ Lactate 1.9  [x] Primacor- 0.2 mcgs/kg/min for afterload reduction and LV support   [x] Amiodarone- 0.5 mg/min for afib prophylaxis /rate control  Vasopressin currently off   [x] 12/7 cath patent LIMA graft and other grafts occluded, femoral IABP placed, with good augmentation @ 1:1, plan for removal tomorrow    [x] 12/8 ECMO d/c'd  Reassessment of hemodynamics post resuscitation   12/8 Aflutter s/p DCCV x2  [x] AC Therapy with Argatroban gtt pAF/flutter  [x] Statin   11/29 HIT NG, MARY pending, f/u result      ***Pulmonary***  Post op vent management   Titration of FiO2 and PEEP, follow SpO2, CXR, blood gasses   CPAP this morning--> Held off on extubating   Plan for extubation over the weekend/ Monday        Mode: AC/ CMV (Assist Control/ Continuous Mandatory Ventilation)  RR (machine): 14  FiO2: 40  PEEP: 5  ITime: 1  MAP: 11  PC: 18  PIP: 24                  ***GI***  [x] NPO with TF's Glucerna 1.5 @goal of 60cc/hr   [x] Protonix    Bowel regimen with Miralax and senna    ***Renal***  ERIC, renal following CVVHD started 12/5, held post procedure today, will re-evaluate resuming, currently no acute indication tonight  CVP goal 10-12  Continue to monitor I/Os, BUN/Creatinine.   Replete lytes PRN  De Dios present      ***ID***  Continue Cefuroxime for perioperative antibiotic coverage  Febrile this morning, given Vanco x1     ***Endocrine***  [x]  DM2: HbA1c 7.3%                - [x] Insulin gtt              - Need tight glycemic control to prevent wound infection.      Patient requires continuous monitoring with bedside rhythm monitoring, pulse oximetry monitoring, and continuous central venous and arterial pressure monitoring; and intermittent blood gas analysis. Care plan discussed with the ICU care team.   Patient remained critical, at risk for life threatening decompensation.    By signing my name below, IAbhijit, attest that this documentation has been prepared under the direction and in the presence of Kylah Chamorro NP   Electronically signed: Jolly Slaughter, 12-09-22 @ 20:15    I, Kylah Chamorro, personally performed the services described in this documentation. all medical record entries made by the jolly were at my direction and in my presence. I have reviewed the chart and agree that the record reflects my personal performance and is accurate and complete  Electronically signed: Kylah Chamorro NP  Patient seen and examined at the bedside.    Remained critically ill on continuous ICU monitoring.    HPI:  Patient is a 63 y/o male with PMH of CABG, DM, HTN, HLD presenting as transfer from [place] for c/f STEMI. PTA arrival received 325 aspirin, 600 plavix, and no heparin drip started. Around 1:30 am patient had multiple episodes of non-bloody diarrhea and emesis with associated abdominal pain and chest pain, unable to localize, non-radiating, with associated SOB and no associated palpitations or diaphoresis. No recent fevers/chills, cough, rashes, or changes in urination. Does report mild diffuse back pain; started 5 days ago in setting on injury while walking and lifting objects. Denies focal weakness, numbness/tingling, or LOC due does report mild dizziness.    Patient seen and examined in ED. Hemodynamically stable, alert and awake, A&Ox3. Daughter at bedside. Reports abdominal pain, and nausea. Abdomen is soft, tender in epigastric area and RUQ. Denies chest pain, SOB. WBC 20, T.bili 3.4, Lipase 300. RUQ US demonstrated gallbladder stones, pericholecystic fluid.  (24 Nov 2022 15:10)      =================== LABS =========================                        9.0    22.65 )-----------( 211      ( 09 Dec 2022 12:23 )             27.1     12-09    133<L>  |  100  |  40<H>  ----------------------------<  252<H>  4.7   |  18<L>  |  2.29<H>    Ca    8.1<L>      09 Dec 2022 12:23  Phos  2.9     12-09  Mg     3.0     12-09    TPro  6.2  /  Alb  2.9<L>  /  TBili  1.1  /  DBili  x   /  AST  57<H>  /  ALT  14  /  AlkPhos  186<H>  12-09    LIVER FUNCTIONS - ( 09 Dec 2022 12:23 )  Alb: 2.9 g/dL / Pro: 6.2 g/dL / ALK PHOS: 186 U/L / ALT: 14 U/L / AST: 57 U/L / GGT: x           PT/INR - ( 08 Dec 2022 10:58 )   PT: 13.5 sec;   INR: 1.16 ratio         PTT - ( 09 Dec 2022 17:32 )  PTT:42.5 sec  ABG - ( 09 Dec 2022 14:50 )  pH, Arterial: 7.41  pH, Blood: x     /  pCO2: 31    /  pO2: 134   / HCO3: 20    / Base Excess: -4.4  /  SaO2: 98.6                  ==================================================    OBJECTIVE:  Vital Signs Last 24 Hrs  T(C): 38.3 (09 Dec 2022 19:00), Max: 38.3 (09 Dec 2022 19:00)  T(F): 100.9 (09 Dec 2022 19:00), Max: 100.9 (09 Dec 2022 19:00)  HR: 118 (09 Dec 2022 20:00) (76 - 120)  BP: --  BP(mean): --  RR: 27 (09 Dec 2022 20:00) (1 - 27)  SpO2: 100% (09 Dec 2022 20:00) (100% - 100%)    Parameters below as of 09 Dec 2022 19:00  Patient On (Oxygen Delivery Method): ventilator    O2 Concentration (%): 40    REVIEW OF SYSTEMS:  [x ] N/A    Physical Exam:  General: Intubated, multiple lines gtt  Neurology: Sedated  Eyes: bilateral pupils equal and reactive   ENT/Neck: +ETT midline, Neck supple, trachea midline, No JVD   Respiratory: rhonchi bilaterally   CV: S1S2, no murmurs        [x] Sinus Tachycardia   Abdominal: Softly distended,+BS   Extremities: 1+ pedal edema noted, + peripheral pulses palpable, warm  Skin: No Rashes, Hematoma, Ecchymosis                Assessment:  63 y/o male with PMH of CABG, DM, HTN, HLD presenting as transfer from [place] for c/f STEMI. PTA arrival received 325 aspirin, 600 plavix, and no heparin drip started. Around 1:30 am patient had multiple episodes of non-bloody diarrhea and emesis with associated abdominal pain and chest pain, unable to localize, non-radiating, with associated SOB and no associated palpitations or diaphoresis. No recent fevers/chills, cough, rashes, or changes in urination. Does report mild diffuse back pain; started 5 days ago in setting on injury while walking and lifting objects. Denies focal weakness, numbness/tingling, or LOC due does report mild dizziness.    acute rspiratory distress , cardiogenic shock s/p [facility] ECMO 11/25  Hemodynamic instability  Hypovolemia  Leukocytosis   Acute blood loss anemia  acute pancreatitis          Plan:   ***Neuro***  CT head showed 4mm subdural hematoma 11/26: repeat CT head unchanged 12/01  [x] Sedated with [x] Precedex for vent synchrony  Post operative neuro assessment   Soft palate laceration, ENT scoped 12/8, stable, no acute intervention at this time      ***Cardiovascular***  12/7 TTE turndown EF 30-40%, normal RV, MR mod   Invasive hemodynamic monitoring, assess perfusion indices   ST / CVP 7/ MAP 63/ SvO2 ___ / Hct 27.1/ Lactate 1.9  [x] Primacor- 0.2 mcgs/kg/min for afterload reduction and LV support   [x] Amiodarone- 0.5 mg/min for afib prophylaxis /rate control  Vasopressin currently off   [x] 12/7 cath patent LIMA graft and other grafts occluded, femoral IABP placed, with good augmentation @ 1:1, plan for removal tomorrow    [x] 12/8 ECMO d/c'd  Reassessment of hemodynamics post resuscitation   12/8 Aflutter s/p DCCV x2  [x] AC Therapy with Argatroban gtt pAF/flutter  [x] Statin   11/29 HIT NG, MARY pending, f/u result      ***Pulmonary***  Post op vent management   Titration of FiO2 and PEEP, follow SpO2, CXR, blood gasses   CPAP this morning--> Held off on extubating   Plan for extubation over the weekend/ Monday        Mode: AC/ CMV (Assist Control/ Continuous Mandatory Ventilation)  RR (machine): 14  FiO2: 40  PEEP: 5  ITime: 1  MAP: 11  PC: 18  PIP: 24                  ***GI***  [x] NPO with TF's Glucerna 1.5 @goal of 60cc/hr   [x] Protonix    Bowel regimen with Miralax and senna    ***Renal***  ERIC, renal following CVVHD started 12/5, held post procedure today, will re-evaluate resuming, currently no acute indication tonight  CVP goal 10-12  Continue to monitor I/Os, BUN/Creatinine.   Replete lytes PRN  De Dios present      ***ID***  Continue Cefuroxime for perioperative antibiotic coverage  Febrile this morning, given Vanco x1     ***Endocrine***  [x]  DM2: HbA1c 7.3%                - [x] Insulin gtt              - Need tight glycemic control to prevent wound infection.      Patient requires continuous monitoring with bedside rhythm monitoring, pulse oximetry monitoring, and continuous central venous and arterial pressure monitoring; and intermittent blood gas analysis. Care plan discussed with the ICU care team.   Patient remained critical, at risk for life threatening decompensation.    By signing my name below, IAbhijit, attest that this documentation has been prepared under the direction and in the presence of Kylah Chamorro NP   Electronically signed: Jolly Slaughter, 12-09-22 @ 20:15    I, Kylah Chamorro, personally performed the services described in this documentation. all medical record entries made by the jolly were at my direction and in my presence. I have reviewed the chart and agree that the record reflects my personal performance and is accurate and complete  Electronically signed: Kylah Chamorro NP

## 2022-12-09 NOTE — PROGRESS NOTE ADULT - SUBJECTIVE AND OBJECTIVE BOX
infectious diseases progress note:    Patient is a 62y old  Male who presents with a chief complaint of Acute cholecystitis, gallstone pancreatitis (09 Dec 2022 07:35)        Acute pancreatitis without infection or necrosis             Allergies    No Known Allergies    Intolerances        ANTIBIOTICS/RELEVANT:  antimicrobials  cefuroxime  IVPB 1500 milliGRAM(s) IV Intermittent every 24 hours    immunologic:    OTHER:  aMIOdarone Infusion 0.5 mG/Min IV Continuous <Continuous>  argatroban Infusion 0.14 MICROgram(s)/kG/Min IV Continuous <Continuous>  atorvastatin 40 milliGRAM(s) Oral at bedtime  chlorhexidine 0.12% Liquid 15 milliLiter(s) Oral Mucosa every 12 hours  chlorhexidine 2% Cloths 1 Application(s) Topical <User Schedule>  CRRT Treatment    <Continuous>  dexMEDEtomidine Infusion 1 MICROgram(s)/kG/Hr IV Continuous <Continuous>  dextrose 50% Injectable 25 milliLiter(s) IV Push every 15 minutes  dextrose 50% Injectable 50 milliLiter(s) IV Push every 15 minutes  insulin regular Infusion 3 Unit(s)/Hr IV Continuous <Continuous>  pantoprazole  Injectable 40 milliGRAM(s) IV Push two times a day  Phoxillum Filtration BK 4 / 2.5 5000 milliLiter(s) CRRT <Continuous>  polyethylene glycol 3350 17 Gram(s) Oral two times a day  PrismaSOL Filtration BGK 4 / 2.5 5000 milliLiter(s) CRRT <Continuous>  PrismaSOL Filtration BGK 4 / 2.5 5000 milliLiter(s) CRRT <Continuous>  senna 2 Tablet(s) Oral at bedtime  sodium chloride 0.9%. 1000 milliLiter(s) IV Continuous <Continuous>      Objective:  Vital Signs Last 24 Hrs  T(C): 37 (09 Dec 2022 08:00), Max: 38.1 (08 Dec 2022 18:00)  T(F): 98.6 (09 Dec 2022 08:00), Max: 100.6 (08 Dec 2022 18:00)  HR: 112 (09 Dec 2022 08:21) (76 - 134)  BP: --  BP(mean): --  RR: 19 (09 Dec 2022 08:00) (1 - 24)  SpO2: 100% (09 Dec 2022 08:21) (95% - 100%)    Parameters below as of 09 Dec 2022 08:00  Patient On (Oxygen Delivery Method): ventilator    O2 Concentration (%): 40    PHYSICAL EXAM:  Constitutional:Well-developed, well nourished--no acute distress  Eyes:INOCENCIO, EOMI  Ear/Nose/Throat: no oral lesion, no sinus tenderness on percussion	  Neck:no JVD, no lymphadenopathy, supple  Respiratory: CTA lore  Cardiovascular: S1S2 RRR, no murmurs  Gastrointestinal:soft, (+) BS, no HSM  Extremities:no e/e/c        LABS:                        8.5    13.43 )-----------( 151      ( 09 Dec 2022 00:19 )             25.7     12-09    137  |  103  |  52<H>  ----------------------------<  146<H>  4.7   |  20<L>  |  3.10<H>    Ca    8.1<L>      09 Dec 2022 00:19  Phos  2.9     12-09  Mg     3.0     12-09    TPro  6.0  /  Alb  3.0<L>  /  TBili  1.0  /  DBili  x   /  AST  56<H>  /  ALT  9<L>  /  AlkPhos  186<H>  12-09    PT/INR - ( 08 Dec 2022 10:58 )   PT: 13.5 sec;   INR: 1.16 ratio         PTT - ( 09 Dec 2022 07:14 )  PTT:39.0 sec        MICROBIOLOGY:    RECENT CULTURES:  12-03 @ 17:05 .Blood Blood                No Growth Final    12-03 @ 15:29 ET Tube ET Tube       Rare polymorphonuclear leukocytes per low power field  No Squamous epithelial cells per low power field  No organisms seen per oil power field           Normal Respiratory Christy present    12-03 @ 14:18 .Blood Blood                No Growth Final          RESPIRATORY CULTURES:              RADIOLOGY & ADDITIONAL STUDIES:        Pager [phone]  After 5 pm/weekends or if no response :[phone] infectious diseases progress note:    Patient is a 62y old  Male who presents with a chief complaint of Acute cholecystitis, gallstone pancreatitis (09 Dec 2022 07:35)        Acute pancreatitis without infection or necrosis             Allergies    No Known Allergies    Intolerances        ANTIBIOTICS/RELEVANT:  antimicrobials  cefuroxime  IVPB 1500 milliGRAM(s) IV Intermittent every 24 hours    immunologic:    OTHER:  aMIOdarone Infusion 0.5 mG/Min IV Continuous <Continuous>  argatroban Infusion 0.14 MICROgram(s)/kG/Min IV Continuous <Continuous>  atorvastatin 40 milliGRAM(s) Oral at bedtime  chlorhexidine 0.12% Liquid 15 milliLiter(s) Oral Mucosa every 12 hours  chlorhexidine 2% Cloths 1 Application(s) Topical <User Schedule>  CRRT Treatment    <Continuous>  dexMEDEtomidine Infusion 1 MICROgram(s)/kG/Hr IV Continuous <Continuous>  dextrose 50% Injectable 25 milliLiter(s) IV Push every 15 minutes  dextrose 50% Injectable 50 milliLiter(s) IV Push every 15 minutes  insulin regular Infusion 3 Unit(s)/Hr IV Continuous <Continuous>  pantoprazole  Injectable 40 milliGRAM(s) IV Push two times a day  Phoxillum Filtration BK 4 / 2.5 5000 milliLiter(s) CRRT <Continuous>  polyethylene glycol 3350 17 Gram(s) Oral two times a day  PrismaSOL Filtration BGK 4 / 2.5 5000 milliLiter(s) CRRT <Continuous>  PrismaSOL Filtration BGK 4 / 2.5 5000 milliLiter(s) CRRT <Continuous>  senna 2 Tablet(s) Oral at bedtime  sodium chloride 0.9%. 1000 milliLiter(s) IV Continuous <Continuous>      Objective:  Vital Signs Last 24 Hrs  T(C): 37 (09 Dec 2022 08:00), Max: 38.1 (08 Dec 2022 18:00)  T(F): 98.6 (09 Dec 2022 08:00), Max: 100.6 (08 Dec 2022 18:00)  HR: 112 (09 Dec 2022 08:21) (76 - 134)  BP: --  BP(mean): --  RR: 19 (09 Dec 2022 08:00) (1 - 24)  SpO2: 100% (09 Dec 2022 08:21) (95% - 100%)    Parameters below as of 09 Dec 2022 08:00  Patient On (Oxygen Delivery Method): ventilator    O2 Concentration (%): 40    PHYSICAL EXAM:  Constitutional:Well-developed, well nourished--no acute distress  Eyes:INOCENCIO, EOMI  Ear/Nose/Throat: no oral lesion, no sinus tenderness on percussion	  Neck:no JVD, no lymphadenopathy, supple  Respiratory: CTA lore  Cardiovascular: S1S2 RRR, no murmurs  Gastrointestinal:soft, (+) BS, no HSM  Extremities:no e/e/c        LABS:                        8.5    13.43 )-----------( 151      ( 09 Dec 2022 00:19 )             25.7     12-09    137  |  103  |  52<H>  ----------------------------<  146<H>  4.7   |  20<L>  |  3.10<H>    Ca    8.1<L>      09 Dec 2022 00:19  Phos  2.9     12-09  Mg     3.0     12-09    TPro  6.0  /  Alb  3.0<L>  /  TBili  1.0  /  DBili  x   /  AST  56<H>  /  ALT  9<L>  /  AlkPhos  186<H>  12-09    PT/INR - ( 08 Dec 2022 10:58 )   PT: 13.5 sec;   INR: 1.16 ratio         PTT - ( 09 Dec 2022 07:14 )  PTT:39.0 sec        MICROBIOLOGY:    RECENT CULTURES:  12-03 @ 17:05 .Blood Blood                No Growth Final    12-03 @ 15:29 ET Tube ET Tube       Rare polymorphonuclear leukocytes per low power field  No Squamous epithelial cells per low power field  No organisms seen per oil power field           Normal Respiratory Christy present    12-03 @ 14:18 .Blood Blood                No Growth Final          RESPIRATORY CULTURES:              RADIOLOGY & ADDITIONAL STUDIES:        Pager [phone]  After 5 pm/weekends or if no response :[phone] infectious diseases progress note:    Patient is a 62y old  Male who presents with a chief complaint of Acute cholecystitis, gallstone pancreatitis (09 Dec 2022 07:35)        Acute pancreatitis without infection or necrosis             Allergies    No Known Allergies    Intolerances        ANTIBIOTICS/RELEVANT:  antimicrobials  cefuroxime  IVPB 1500 milliGRAM(s) IV Intermittent every 24 hours    immunologic:    OTHER:  aMIOdarone Infusion 0.5 mG/Min IV Continuous <Continuous>  argatroban Infusion 0.14 MICROgram(s)/kG/Min IV Continuous <Continuous>  atorvastatin 40 milliGRAM(s) Oral at bedtime  chlorhexidine 0.12% Liquid 15 milliLiter(s) Oral Mucosa every 12 hours  chlorhexidine 2% Cloths 1 Application(s) Topical <User Schedule>  CRRT Treatment    <Continuous>  dexMEDEtomidine Infusion 1 MICROgram(s)/kG/Hr IV Continuous <Continuous>  dextrose 50% Injectable 25 milliLiter(s) IV Push every 15 minutes  dextrose 50% Injectable 50 milliLiter(s) IV Push every 15 minutes  insulin regular Infusion 3 Unit(s)/Hr IV Continuous <Continuous>  pantoprazole  Injectable 40 milliGRAM(s) IV Push two times a day  Phoxillum Filtration BK 4 / 2.5 5000 milliLiter(s) CRRT <Continuous>  polyethylene glycol 3350 17 Gram(s) Oral two times a day  PrismaSOL Filtration BGK 4 / 2.5 5000 milliLiter(s) CRRT <Continuous>  PrismaSOL Filtration BGK 4 / 2.5 5000 milliLiter(s) CRRT <Continuous>  senna 2 Tablet(s) Oral at bedtime  sodium chloride 0.9%. 1000 milliLiter(s) IV Continuous <Continuous>      Objective:  Vital Signs Last 24 Hrs  T(C): 37 (09 Dec 2022 08:00), Max: 38.1 (08 Dec 2022 18:00)  T(F): 98.6 (09 Dec 2022 08:00), Max: 100.6 (08 Dec 2022 18:00)  HR: 112 (09 Dec 2022 08:21) (76 - 134)  BP: --  BP(mean): --  RR: 19 (09 Dec 2022 08:00) (1 - 24)  SpO2: 100% (09 Dec 2022 08:21) (95% - 100%)    Parameters below as of 09 Dec 2022 08:00  Patient On (Oxygen Delivery Method): ventilator    O2 Concentration (%): 40    PHYSICAL EXAM:  Constitutional:Well-developed, well nourished--no acute distress  Eyes:INOCENCIO, EOMI  Ear/Nose/Throat: no oral lesion, no sinus tenderness on percussion	  Neck:no JVD, no lymphadenopathy, supple  Respiratory: CTA olre  Cardiovascular: S1S2 RRR, no murmurs  Gastrointestinal:soft, (+) BS, no HSM  Extremities:no e/e/c        LABS:                        8.5    13.43 )-----------( 151      ( 09 Dec 2022 00:19 )             25.7     12-09    137  |  103  |  52<H>  ----------------------------<  146<H>  4.7   |  20<L>  |  3.10<H>    Ca    8.1<L>      09 Dec 2022 00:19  Phos  2.9     12-09  Mg     3.0     12-09    TPro  6.0  /  Alb  3.0<L>  /  TBili  1.0  /  DBili  x   /  AST  56<H>  /  ALT  9<L>  /  AlkPhos  186<H>  12-09    PT/INR - ( 08 Dec 2022 10:58 )   PT: 13.5 sec;   INR: 1.16 ratio         PTT - ( 09 Dec 2022 07:14 )  PTT:39.0 sec        MICROBIOLOGY:    RECENT CULTURES:  12-03 @ 17:05 .Blood Blood                No Growth Final    12-03 @ 15:29 ET Tube ET Tube       Rare polymorphonuclear leukocytes per low power field  No Squamous epithelial cells per low power field  No organisms seen per oil power field           Normal Respiratory Christy present    12-03 @ 14:18 .Blood Blood                No Growth Final          RESPIRATORY CULTURES:              RADIOLOGY & ADDITIONAL STUDIES:        Pager [phone]  After 5 pm/weekends or if no response :[phone]

## 2022-12-09 NOTE — PROGRESS NOTE ADULT - ATTENDING COMMENTS
ERIC ATN   Cardiogenic shock  ECMO decannualation 12/8  CRRT restarted overnight  Plan to continue CRRT and keep negative     Melissa Mercer MD  Off: [phone]  contact me on teams    (After 5 pm or on weekends please page the on-call fellow/attending, can check [email or web address] for schedule. Login is carmen cantor, schedule under [facility] medicine, psych, derm) ERIC ATN   Cardiogenic shock  ECMO decannualation 12/8  CRRT restarted overnight  Plan to continue CRRT and keep negative     Melissa Mercer MD  Off: [phone]  contact me on teams    (After 5 pm or on weekends please page the on-call fellow/attending, can check [email or web address] for schedule. Login is carmen cantor, schedule under [facility] medicine, psych, derm) ERIC ATN   Cardiogenic shock  ECMO decannualation 12/8  CRRT restarted overnight  Plan to continue CRRT and keep negative     Melissa Mercer MD  Off: [phone]  contact me on teams    (After 5 pm or on weekends please page the on-call fellow/attending, can check [email or web address] for schedule. Login is carmen cantor, schedule under [facility] medicine, psych, derm) ERIC ATN    Cardiogenic shock + ARDS pancreatitis  ECMO decannulation 12/8  CRRT restarted overnight  Plan to continue CRRT and keep negative     Melissa Mercer MD  Off: [phone]  contact me on teams    (After 5 pm or on weekends please page the on-call fellow/attending, can check [email or web address] for schedule. Login is carmen cantor, schedule under [facility] medicine, psych, derm) ERIC ATN    Cardiogenic shock + ARDS pancreatitis  ECMO decannulation 12/8  CRRT restarted overnight  Plan to continue CRRT and keep negative     Melissa Mercer MD  Off: [phone]  contact me on teams    (After 5 pm or on weekends please page the on-call fellow/attending, can check [email or web address] for schedule. Login is carmen cantor, schedule under [facility] medicine, psych, derm) ERIC ATN    Cardiogenic shock + ARDS pancreatitis  ECMO decannulation 12/8  CRRT restarted overnight  Plan to continue CRRT and keep negative     Melissa Mercer MD  Off: [phone]  contact me on teams    (After 5 pm or on weekends please page the on-call fellow/attending, can check [email or web address] for schedule. Login is carmen cantor, schedule under [facility] medicine, psych, derm)

## 2022-12-09 NOTE — PROGRESS NOTE ADULT - PROBLEM SELECTOR PLAN 1
Pt with non oliguric ERIC/ ATN in the setting of STEMI, cardiac arrest & cardiogenic shock  SCr on arrival was 2.15; trended down to hector 1.6 on 11/25;  slowly trended up & increased to 3.95 11/28.     Pt received IV diuretics. SCr increased to 4.19 with BUN of 101 on 12/5/22. Pt with significant volume overload. Pt initated on CRRT/CVVHDF to optimize volume and electrolytes on 12/5/22. BP being maintained on vasopressors. Pt likely with ATN. Pt underwent decannulation of ECMO on 12/8/22 and was taken off CRRT. Pt reinitiated on CRRT overnight on 12/9/22 for volume overload. Plan is to continue CRRT. Monitor labs and urine output. Avoid any potential nephrotoxins. Dose medications as per eGFR.

## 2022-12-10 LAB
ALBUMIN SERPL ELPH-MCNC: 2.9 G/DL — LOW (ref 3.3–5)
ALP SERPL-CCNC: 171 U/L — HIGH (ref 40–120)
ALT FLD-CCNC: 15 U/L — SIGNIFICANT CHANGE UP (ref 10–45)
ANION GAP SERPL CALC-SCNC: 12 MMOL/L — SIGNIFICANT CHANGE UP (ref 5–17)
APPEARANCE UR: ABNORMAL
APTT BLD: 37.5 SEC — HIGH (ref 27.5–35.5)
APTT BLD: 39.9 SEC — HIGH (ref 27.5–35.5)
APTT BLD: 43.7 SEC — HIGH (ref 27.5–35.5)
AST SERPL-CCNC: 55 U/L — HIGH (ref 10–40)
BACTERIA # UR AUTO: ABNORMAL
BASE EXCESS BLDV CALC-SCNC: -0.5 MMOL/L — SIGNIFICANT CHANGE UP (ref -2–3)
BASE EXCESS BLDV CALC-SCNC: -1.2 MMOL/L — SIGNIFICANT CHANGE UP (ref -2–3)
BASE EXCESS BLDV CALC-SCNC: -2 MMOL/L — SIGNIFICANT CHANGE UP (ref -2–3)
BASE EXCESS BLDV CALC-SCNC: 0 MMOL/L — SIGNIFICANT CHANGE UP (ref -2–3)
BASE EXCESS BLDV CALC-SCNC: 0.1 MMOL/L — SIGNIFICANT CHANGE UP (ref -2–3)
BILIRUB SERPL-MCNC: 0.9 MG/DL — SIGNIFICANT CHANGE UP (ref 0.2–1.2)
BILIRUB UR-MCNC: ABNORMAL
BUN SERPL-MCNC: 36 MG/DL — HIGH (ref 7–23)
CA-I SERPL-SCNC: 1.05 MMOL/L — LOW (ref 1.15–1.33)
CALCIUM SERPL-MCNC: 7.5 MG/DL — LOW (ref 8.4–10.5)
CHLORIDE BLDV-SCNC: 106 MMOL/L — SIGNIFICANT CHANGE UP (ref 96–108)
CHLORIDE SERPL-SCNC: 103 MMOL/L — SIGNIFICANT CHANGE UP (ref 96–108)
CO2 BLDV-SCNC: 22 MMOL/L — SIGNIFICANT CHANGE UP (ref 22–26)
CO2 BLDV-SCNC: 25 MMOL/L — SIGNIFICANT CHANGE UP (ref 22–26)
CO2 BLDV-SCNC: 26 MMOL/L — SIGNIFICANT CHANGE UP (ref 22–26)
CO2 SERPL-SCNC: 21 MMOL/L — LOW (ref 22–31)
COLOR SPEC: SIGNIFICANT CHANGE UP
CREAT SERPL-MCNC: 2.19 MG/DL — HIGH (ref 0.5–1.3)
DIFF PNL FLD: ABNORMAL
DIGOXIN SERPL-MCNC: 1 NG/ML — SIGNIFICANT CHANGE UP (ref 0.8–2)
EGFR: 33 ML/MIN/1.73M2 — LOW
EPI CELLS # UR: 3 — SIGNIFICANT CHANGE UP
GAS PNL BLDA: SIGNIFICANT CHANGE UP
GAS PNL BLDV: 132 MMOL/L — LOW (ref 136–145)
GAS PNL BLDV: SIGNIFICANT CHANGE UP
GLUCOSE BLDC GLUCOMTR-MCNC: 117 MG/DL — HIGH (ref 70–99)
GLUCOSE BLDC GLUCOMTR-MCNC: 126 MG/DL — HIGH (ref 70–99)
GLUCOSE BLDC GLUCOMTR-MCNC: 127 MG/DL — HIGH (ref 70–99)
GLUCOSE BLDC GLUCOMTR-MCNC: 140 MG/DL — HIGH (ref 70–99)
GLUCOSE BLDC GLUCOMTR-MCNC: 142 MG/DL — HIGH (ref 70–99)
GLUCOSE BLDC GLUCOMTR-MCNC: 145 MG/DL — HIGH (ref 70–99)
GLUCOSE BLDC GLUCOMTR-MCNC: 153 MG/DL — HIGH (ref 70–99)
GLUCOSE BLDC GLUCOMTR-MCNC: 156 MG/DL — HIGH (ref 70–99)
GLUCOSE BLDC GLUCOMTR-MCNC: 88 MG/DL — SIGNIFICANT CHANGE UP (ref 70–99)
GLUCOSE BLDV-MCNC: 165 MG/DL — HIGH (ref 70–99)
GLUCOSE SERPL-MCNC: 113 MG/DL — HIGH (ref 70–99)
GLUCOSE UR QL: ABNORMAL
GRAM STN FLD: SIGNIFICANT CHANGE UP
GRAN CASTS # UR COMP ASSIST: 10 /LPF — SIGNIFICANT CHANGE UP
HCO3 BLDV-SCNC: 21 MMOL/L — LOW (ref 22–29)
HCO3 BLDV-SCNC: 24 MMOL/L — SIGNIFICANT CHANGE UP (ref 22–29)
HCO3 BLDV-SCNC: 25 MMOL/L — SIGNIFICANT CHANGE UP (ref 22–29)
HCT VFR BLD CALC: 29 % — LOW (ref 39–50)
HCT VFR BLDA CALC: 25 % — LOW (ref 39–51)
HGB BLD CALC-MCNC: 8.4 G/DL — LOW (ref 12.6–17.4)
HGB BLD-MCNC: 9.5 G/DL — LOW (ref 13–17)
HOROWITZ INDEX BLDV+IHG-RTO: 40 — SIGNIFICANT CHANGE UP
INR BLD: 1.64 RATIO — HIGH (ref 0.88–1.16)
KETONES UR-MCNC: SIGNIFICANT CHANGE UP
LACTATE BLDV-MCNC: 1.7 MMOL/L — SIGNIFICANT CHANGE UP (ref 0.5–2)
LEUKOCYTE ESTERASE UR-ACNC: ABNORMAL
MAGNESIUM SERPL-MCNC: 2.5 MG/DL — SIGNIFICANT CHANGE UP (ref 1.6–2.6)
MCHC RBC-ENTMCNC: 29.1 PG — SIGNIFICANT CHANGE UP (ref 27–34)
MCHC RBC-ENTMCNC: 32.8 GM/DL — SIGNIFICANT CHANGE UP (ref 32–36)
MCV RBC AUTO: 88.7 FL — SIGNIFICANT CHANGE UP (ref 80–100)
NITRITE UR-MCNC: POSITIVE
NRBC # BLD: 2 /100 WBCS — HIGH (ref 0–0)
PCO2 BLDV: 30 MMHG — LOW (ref 42–55)
PCO2 BLDV: 39 MMHG — LOW (ref 42–55)
PCO2 BLDV: 40 MMHG — LOW (ref 42–55)
PH BLDV: 7.39 — SIGNIFICANT CHANGE UP (ref 7.32–7.43)
PH BLDV: 7.4 — SIGNIFICANT CHANGE UP (ref 7.32–7.43)
PH BLDV: 7.41 — SIGNIFICANT CHANGE UP (ref 7.32–7.43)
PH BLDV: 7.46 — HIGH (ref 7.32–7.43)
PH UR: 5 — SIGNIFICANT CHANGE UP (ref 5–8)
PHOSPHATE SERPL-MCNC: 3.4 MG/DL — SIGNIFICANT CHANGE UP (ref 2.5–4.5)
PLATELET # BLD AUTO: 150 K/UL — SIGNIFICANT CHANGE UP (ref 150–400)
PO2 BLDV: 33 MMHG — SIGNIFICANT CHANGE UP (ref 25–45)
PO2 BLDV: 35 MMHG — SIGNIFICANT CHANGE UP (ref 25–45)
PO2 BLDV: 36 MMHG — SIGNIFICANT CHANGE UP (ref 25–45)
PO2 BLDV: 44 MMHG — SIGNIFICANT CHANGE UP (ref 25–45)
POTASSIUM BLDV-SCNC: 4.9 MMOL/L — SIGNIFICANT CHANGE UP (ref 3.5–5.1)
POTASSIUM SERPL-MCNC: 4.6 MMOL/L — SIGNIFICANT CHANGE UP (ref 3.5–5.3)
POTASSIUM SERPL-SCNC: 4.6 MMOL/L — SIGNIFICANT CHANGE UP (ref 3.5–5.3)
PREALB SERPL-MCNC: 13 MG/DL — LOW (ref 20–40)
PROT SERPL-MCNC: 6 G/DL — SIGNIFICANT CHANGE UP (ref 6–8.3)
PROT UR-MCNC: ABNORMAL
PROTHROM AB SERPL-ACNC: 19 SEC — HIGH (ref 10.5–13.4)
RBC # BLD: 3.27 M/UL — LOW (ref 4.2–5.8)
RBC # FLD: 14.6 % — HIGH (ref 10.3–14.5)
RBC CASTS # UR COMP ASSIST: >50 /HPF — HIGH (ref 0–4)
SAO2 % BLDV: 55.6 % — LOW (ref 67–88)
SAO2 % BLDV: 59.8 % — LOW (ref 67–88)
SAO2 % BLDV: 60.5 % — LOW (ref 67–88)
SAO2 % BLDV: 64.7 % — LOW (ref 67–88)
SAO2 % BLDV: 78.9 % — SIGNIFICANT CHANGE UP (ref 67–88)
SODIUM SERPL-SCNC: 136 MMOL/L — SIGNIFICANT CHANGE UP (ref 135–145)
SP GR SPEC: 1.02 — SIGNIFICANT CHANGE UP (ref 1.01–1.02)
SPECIMEN SOURCE: SIGNIFICANT CHANGE UP
T3 SERPL-MCNC: 56 NG/DL — LOW (ref 80–200)
T4 AB SER-ACNC: 6.4 UG/DL — SIGNIFICANT CHANGE UP (ref 4.6–12)
TSH SERPL-MCNC: 2.84 UIU/ML — SIGNIFICANT CHANGE UP (ref 0.27–4.2)
UROBILINOGEN FLD QL: NEGATIVE — SIGNIFICANT CHANGE UP
VANCOMYCIN TROUGH SERPL-MCNC: 4.1 UG/ML — LOW (ref 10–20)
WBC # BLD: 17.94 K/UL — HIGH (ref 3.8–10.5)
WBC # FLD AUTO: 17.94 K/UL — HIGH (ref 3.8–10.5)
WBC UR QL: >50 /HPF — HIGH (ref 0–5)

## 2022-12-10 PROCEDURE — 99291 CRITICAL CARE FIRST HOUR: CPT | Mod: GC

## 2022-12-10 PROCEDURE — 99292 CRITICAL CARE ADDL 30 MIN: CPT | Mod: 25

## 2022-12-10 PROCEDURE — 99291 CRITICAL CARE FIRST HOUR: CPT | Mod: 25

## 2022-12-10 PROCEDURE — 36556 INSERT NON-TUNNEL CV CATH: CPT

## 2022-12-10 PROCEDURE — 71045 X-RAY EXAM CHEST 1 VIEW: CPT | Mod: 26,76

## 2022-12-10 PROCEDURE — 99233 SBSQ HOSP IP/OBS HIGH 50: CPT

## 2022-12-10 PROCEDURE — 93010 ELECTROCARDIOGRAM REPORT: CPT

## 2022-12-10 PROCEDURE — 99233 SBSQ HOSP IP/OBS HIGH 50: CPT | Mod: GC

## 2022-12-10 PROCEDURE — 93306 TTE W/DOPPLER COMPLETE: CPT | Mod: 26

## 2022-12-10 RX ORDER — NOREPINEPHRINE BITARTRATE/D5W 8 MG/250ML
0.05 PLASTIC BAG, INJECTION (ML) INTRAVENOUS
Qty: 8 | Refills: 0 | Status: DISCONTINUED | OUTPATIENT
Start: 2022-12-10 | End: 2022-12-11

## 2022-12-10 RX ORDER — DOBUTAMINE HCL 250MG/20ML
1.25 VIAL (ML) INTRAVENOUS
Qty: 500 | Refills: 0 | Status: DISCONTINUED | OUTPATIENT
Start: 2022-12-10 | End: 2022-12-10

## 2022-12-10 RX ORDER — ALBUMIN HUMAN 25 %
50 VIAL (ML) INTRAVENOUS
Refills: 0 | Status: DISCONTINUED | OUTPATIENT
Start: 2022-12-10 | End: 2022-12-10

## 2022-12-10 RX ORDER — CEFEPIME 1 G/1
1000 INJECTION, POWDER, FOR SOLUTION INTRAMUSCULAR; INTRAVENOUS EVERY 8 HOURS
Refills: 0 | Status: DISCONTINUED | OUTPATIENT
Start: 2022-12-10 | End: 2022-12-12

## 2022-12-10 RX ORDER — VANCOMYCIN HCL 1 G
1000 VIAL (EA) INTRAVENOUS EVERY 12 HOURS
Refills: 0 | Status: DISCONTINUED | OUTPATIENT
Start: 2022-12-10 | End: 2022-12-12

## 2022-12-10 RX ORDER — CALCIUM GLUCONATE 100 MG/ML
2 VIAL (ML) INTRAVENOUS ONCE
Refills: 0 | Status: COMPLETED | OUTPATIENT
Start: 2022-12-10 | End: 2022-12-10

## 2022-12-10 RX ORDER — ALBUMIN HUMAN 25 %
250 VIAL (ML) INTRAVENOUS ONCE
Refills: 0 | Status: DISCONTINUED | OUTPATIENT
Start: 2022-12-10 | End: 2022-12-10

## 2022-12-10 RX ADMIN — Medication 100 MILLIGRAM(S): at 08:00

## 2022-12-10 RX ADMIN — Medication 200 GRAM(S): at 11:43

## 2022-12-10 RX ADMIN — PANTOPRAZOLE SODIUM 40 MILLIGRAM(S): 20 TABLET, DELAYED RELEASE ORAL at 18:36

## 2022-12-10 RX ADMIN — CHLORHEXIDINE GLUCONATE 15 MILLILITER(S): 213 SOLUTION TOPICAL at 06:56

## 2022-12-10 RX ADMIN — CHLORHEXIDINE GLUCONATE 1 APPLICATION(S): 213 SOLUTION TOPICAL at 06:56

## 2022-12-10 RX ADMIN — ATORVASTATIN CALCIUM 40 MILLIGRAM(S): 80 TABLET, FILM COATED ORAL at 22:04

## 2022-12-10 RX ADMIN — CEFEPIME 100 MILLIGRAM(S): 1 INJECTION, POWDER, FOR SOLUTION INTRAMUSCULAR; INTRAVENOUS at 16:00

## 2022-12-10 RX ADMIN — PANTOPRAZOLE SODIUM 40 MILLIGRAM(S): 20 TABLET, DELAYED RELEASE ORAL at 06:57

## 2022-12-10 RX ADMIN — Medication 250 MILLIGRAM(S): at 18:00

## 2022-12-10 RX ADMIN — CEFEPIME 100 MILLIGRAM(S): 1 INJECTION, POWDER, FOR SOLUTION INTRAMUSCULAR; INTRAVENOUS at 22:21

## 2022-12-10 RX ADMIN — CHLORHEXIDINE GLUCONATE 15 MILLILITER(S): 213 SOLUTION TOPICAL at 18:36

## 2022-12-10 NOTE — PROGRESS NOTE ADULT - SUBJECTIVE AND OBJECTIVE BOX
Patient seen and examined at the bedside.    Remained critically ill on continuous ICU monitoring.    =================== LABS =========================                        9.5    17.94 )-----------( 150      ( 10 Dec 2022 00:47 )             29.0     12-10    136  |  103  |  36<H>  ----------------------------<  113<H>  4.6   |  21<L>  |  2.19<H>    Ca    7.5<L>      10 Dec 2022 00:47  Phos  3.4     12-10  Mg     2.5     12-10    TPro  6.0  /  Alb  2.9<L>  /  TBili  0.9  /  DBili  x   /  AST  55<H>  /  ALT  15  /  AlkPhos  171<H>  12-10    LIVER FUNCTIONS - ( 10 Dec 2022 00:47 )  Alb: 2.9 g/dL / Pro: 6.0 g/dL / ALK PHOS: 171 U/L / ALT: 15 U/L / AST: 55 U/L / GGT: x           PT/INR - ( 10 Dec 2022 10:52 )   PT: 19.0 sec;   INR: 1.64 ratio         PTT - ( 10 Dec 2022 10:52 )  PTT:43.7 sec  ABG - ( 10 Dec 2022 15:15 )  pH, Arterial: 7.44  pH, Blood: x     /  pCO2: 32    /  pO2: 81    / HCO3: 22    / Base Excess: -2.0  /  SaO2: 97.5                  ==================================================    OBJECTIVE:  Vital Signs Last 24 Hrs  T(C): 36.3 (10 Dec 2022 16:00), Max: 38.1 (10 Dec 2022 00:00)  T(F): 97.3 (10 Dec 2022 16:00), Max: 100.6 (10 Dec 2022 00:00)  HR: 73 (10 Dec 2022 18:45) (73 - 124)  BP: --  BP(mean): --  RR: 30 (10 Dec 2022 18:45) (2 - 30)  SpO2: 97% (10 Dec 2022 18:45) (91% - 100%)    Parameters below as of 10 Dec 2022 16:00  Patient On (Oxygen Delivery Method): ventilator    O2 Concentration (%): 40    REVIEW OF SYSTEMS:   [x ] N/A    Physical Exam:  General: Intubated, multiple lines gtt  Neurology: on/off sedation  Eyes: bilateral pupils equal and reactive   ENT/Neck: +ETT midline, Neck supple, trachea midline, No JVD   Respiratory: rhonchi bilaterally   CV: S1S2, no murmurs        [x] Sinus Tachycardia   Abdominal: Softly distended,+BS   Extremities: 1+ pedal edema noted, + peripheral pulses palpable, warm  Skin: No Rashes, Hematoma, Ecchymosis                          Assessment:  63 y/o male with PMH of CABG, DM, HTN, HLD presenting as transfer from [place] for c/f STEMI. PTA arrival received 325 aspirin, 600 plavix, and no heparin drip started. Around 1:30 am patient had multiple episodes of non-bloody diarrhea and emesis with associated abdominal pain and chest pain, unable to localize, non-radiating, with associated SOB and no associated palpitations or diaphoresis. No recent fevers/chills, cough, rashes, or changes in urination. Does report mild diffuse back pain; started 5 days ago in setting on injury while walking and lifting objects. Denies focal weakness, numbness/tingling, or LOC due does report mild dizziness.    acute respiratory distress/failure, intubated ,   cardiogenic shock s/p [facility] ECMO decannulated 12/8  CAD/ASHD/CABG, acute MI  cardiogenic shock  Hemodynamic instability  acute renal failure  encounter management IABP  encounter weaning ventilator  Leukocytosis   Anemia  acute pancreatitis    Plan:   ***Neuro***  CT head showed 4mm subdural hematoma 11/26: repeat CT head unchanged 12/01,  [x] Sedated with [x] Precedex for vent synchrony- on/off  Post operative neuro assessment   Soft palate laceration, ENT scoped 12/8, stable, no acute intervention at this time      ***Cardiovascular***  12/7 TTE turndown EF 30-40%, normal RV, MR mod   12/8 GRZEGORZ EF 40-45%, MR, TR, normal RV function  Invasive hemodynamic monitoring, assess perfusion indices   ST / CVP 40/ MAP 77/Hct 27.0 Lactate 1.4  [x] Primacor- 0.2 mcgs/kg/min   [x] Amiodarone- 0.5 mg/min   [x] Levophed 0.02 mcg/kg/hr [x] Vasopressin 0.07 U/Min   [x] 12/7 cath patent LIMA graft and other grafts occluded, femoral IABP placed, with good augmentation @ 1:1,  [x] 12/8 ECMO d/c'd  Continuos reassessment of hemodynamics   12/8 Aflutter s/p DCCV x2  [x] AC Therapy with Argatroban gtt pAF/flutter ptt 40-45  [x] Statin   11/29 HIT NG, MARY pending, f/u result      ***Pulmonary***  Post op vent management   Titration of FiO2 and PEEP, follow SpO2, CXR, blood gasses   CPAP this morning--> Held off on extubating   Plan for extubation tomorrow/ Monday       Mode: AC/ CMV (Assist Control/ Continuous Mandatory Ventilation)  RR (machine): 14  FiO2: 40  PEEP: 5  ITime: 1  MAP: 12  PC: 18  PIP: 26            Will plan to wean & extubate once pt is hemodynamically stable and not bleeding    ***GI***  [x] NPO with TF's Glucerna 1.5 @ 0cc/hr with goal of 60cc/hr   [x] Protonix    Bowel regimen with Miralax and senna      ***Renal***  GFR 33/ ERIC, renal following CVVHD started 12/5  Continue to monitor I/Os, BUN/Creatinine.   Replete lytes PRN  De Dios present [x] positive       ***ID***  Continue Cefuroxime for perioperative antibiotic coverage  Started emperic  coverage with Vancomycin and Cefepime   Plan to CT to rule out infection     ***Endocrine***  [x]  DM2: HbA1c 7.3%                - [x] Insulin gtt              - Need tight glycemic control to prevent wound infection.            Patient requires continuous monitoring with bedside rhythm monitoring, pulse oximetry monitoring, and continuous central venous and arterial pressure monitoring; and intermittent blood gas analysis. Care plan discussed with the ICU care team.   Patient remained critical, at risk for life threatening decompensation.    By signing my name below, I, Leela Avila, attest that this documentation has been prepared under the direction and in the presence of Kylah Chamorro NP   Electronically signed: Brian Oakes, 12-10-22 @ 19:10    I, Kylah Chamorro, personally performed the services described in this documentation. all medical record entries made by the karlaibe were at my direction and in my presence. I have reviewed the chart and agree that the record reflects my personal performance and is accurate and complete  Electronically signed: Kylah Chamorro NP  Patient seen and examined at the bedside.    Remained critically ill on continuous ICU monitoring.    =================== LABS =========================                        9.5    17.94 )-----------( 150      ( 10 Dec 2022 00:47 )             29.0     12-10    136  |  103  |  36<H>  ----------------------------<  113<H>  4.6   |  21<L>  |  2.19<H>    Ca    7.5<L>      10 Dec 2022 00:47  Phos  3.4     12-10  Mg     2.5     12-10    TPro  6.0  /  Alb  2.9<L>  /  TBili  0.9  /  DBili  x   /  AST  55<H>  /  ALT  15  /  AlkPhos  171<H>  12-10    LIVER FUNCTIONS - ( 10 Dec 2022 00:47 )  Alb: 2.9 g/dL / Pro: 6.0 g/dL / ALK PHOS: 171 U/L / ALT: 15 U/L / AST: 55 U/L / GGT: x           PT/INR - ( 10 Dec 2022 10:52 )   PT: 19.0 sec;   INR: 1.64 ratio         PTT - ( 10 Dec 2022 10:52 )  PTT:43.7 sec  ABG - ( 10 Dec 2022 15:15 )  pH, Arterial: 7.44  pH, Blood: x     /  pCO2: 32    /  pO2: 81    / HCO3: 22    / Base Excess: -2.0  /  SaO2: 97.5                  ==================================================    OBJECTIVE:  Vital Signs Last 24 Hrs  T(C): 36.3 (10 Dec 2022 16:00), Max: 38.1 (10 Dec 2022 00:00)  T(F): 97.3 (10 Dec 2022 16:00), Max: 100.6 (10 Dec 2022 00:00)  HR: 73 (10 Dec 2022 18:45) (73 - 124)  BP: --  BP(mean): --  RR: 30 (10 Dec 2022 18:45) (2 - 30)  SpO2: 97% (10 Dec 2022 18:45) (91% - 100%)    Parameters below as of 10 Dec 2022 16:00  Patient On (Oxygen Delivery Method): ventilator    O2 Concentration (%): 40    REVIEW OF SYSTEMS:   [x ] N/A    Physical Exam:  General: Intubated, multiple lines gtt  Neurology: on/off sedation  Eyes: bilateral pupils equal and reactive   ENT/Neck: +ETT midline, Neck supple, trachea midline, No JVD   Respiratory: rhonchi bilaterally   CV: S1S2, no murmurs        [x] Sinus Tachycardia   Abdominal: Softly distended,+BS   Extremities: 1+ pedal edema noted, + peripheral pulses palpable, warm  Skin: No Rashes, Hematoma, Ecchymosis                          Assessment:  61 y/o male with PMH of CABG, DM, HTN, HLD presenting as transfer from [place] for c/f STEMI. PTA arrival received 325 aspirin, 600 plavix, and no heparin drip started. Around 1:30 am patient had multiple episodes of non-bloody diarrhea and emesis with associated abdominal pain and chest pain, unable to localize, non-radiating, with associated SOB and no associated palpitations or diaphoresis. No recent fevers/chills, cough, rashes, or changes in urination. Does report mild diffuse back pain; started 5 days ago in setting on injury while walking and lifting objects. Denies focal weakness, numbness/tingling, or LOC due does report mild dizziness.    acute respiratory distress/failure, intubated ,   cardiogenic shock s/p VA ECMO decannulated 12/8  CAD/ASHD/CABG, acute MI  cardiogenic shock  Hemodynamic instability  acute renal failure  encounter management IABP  encounter weaning ventilator  Leukocytosis   Anemia  acute pancreatitis    Plan:   ***Neuro***  CT head showed 4mm subdural hematoma 11/26: repeat CT head unchanged 12/01,  [x] Sedated with [x] Precedex for vent synchrony- on/off  Post operative neuro assessment   Soft palate laceration, ENT scoped 12/8, stable, no acute intervention at this time      ***Cardiovascular***  12/7 TTE turndown EF 30-40%, normal RV, MR mod   12/8 GRZEGORZ EF 40-45%, MR, TR, normal RV function  Invasive hemodynamic monitoring, assess perfusion indices   ST / CVP 40/ MAP 77/Hct 27.0 Lactate 1.4  [x] Primacor- 0.2 mcgs/kg/min   [x] Amiodarone- 0.5 mg/min   [x] Levophed 0.02 mcg/kg/hr [x] Vasopressin 0.07 U/Min   [x] 12/7 cath patent LIMA graft and other grafts occluded, femoral IABP placed, with good augmentation @ 1:1,  [x] 12/8 ECMO d/c'd  Continuos reassessment of hemodynamics   12/8 Aflutter s/p DCCV x2  [x] AC Therapy with Argatroban gtt pAF/flutter ptt 40-45  [x] Statin   11/29 HIT NG, MARY pending, f/u result      ***Pulmonary***  Post op vent management   Titration of FiO2 and PEEP, follow SpO2, CXR, blood gasses   CPAP this morning--> Held off on extubating   Plan for extubation tomorrow/ Monday       Mode: AC/ CMV (Assist Control/ Continuous Mandatory Ventilation)  RR (machine): 14  FiO2: 40  PEEP: 5  ITime: 1  MAP: 12  PC: 18  PIP: 26            Will plan to wean & extubate once pt is hemodynamically stable and not bleeding    ***GI***  [x] NPO with TF's Glucerna 1.5 @ 0cc/hr with goal of 60cc/hr   [x] Protonix    Bowel regimen with Miralax and senna      ***Renal***  GFR 33/ ERIC, renal following CVVHD started 12/5  Continue to monitor I/Os, BUN/Creatinine.   Replete lytes PRN  De Dios present [x] positive       ***ID***  Continue Cefuroxime for perioperative antibiotic coverage  Started emperic  coverage with Vancomycin and Cefepime   Plan to CT to rule out infection     ***Endocrine***  [x]  DM2: HbA1c 7.3%                - [x] Insulin gtt              - Need tight glycemic control to prevent wound infection.            Patient requires continuous monitoring with bedside rhythm monitoring, pulse oximetry monitoring, and continuous central venous and arterial pressure monitoring; and intermittent blood gas analysis. Care plan discussed with the ICU care team.   Patient remained critical, at risk for life threatening decompensation.    By signing my name below, I, Leela Avila, attest that this documentation has been prepared under the direction and in the presence of Kylah Chamorro NP   Electronically signed: Brian Oakes, 12-10-22 @ 19:10    I, Kylah Chamorro, personally performed the services described in this documentation. all medical record entries made by the karlaibe were at my direction and in my presence. I have reviewed the chart and agree that the record reflects my personal performance and is accurate and complete  Electronically signed: Kylah Chamorro NP  Patient seen and examined at the bedside.    Remained critically ill on continuous ICU monitoring.    =================== LABS =========================                        9.5    17.94 )-----------( 150      ( 10 Dec 2022 00:47 )             29.0     12-10    136  |  103  |  36<H>  ----------------------------<  113<H>  4.6   |  21<L>  |  2.19<H>    Ca    7.5<L>      10 Dec 2022 00:47  Phos  3.4     12-10  Mg     2.5     12-10    TPro  6.0  /  Alb  2.9<L>  /  TBili  0.9  /  DBili  x   /  AST  55<H>  /  ALT  15  /  AlkPhos  171<H>  12-10    LIVER FUNCTIONS - ( 10 Dec 2022 00:47 )  Alb: 2.9 g/dL / Pro: 6.0 g/dL / ALK PHOS: 171 U/L / ALT: 15 U/L / AST: 55 U/L / GGT: x           PT/INR - ( 10 Dec 2022 10:52 )   PT: 19.0 sec;   INR: 1.64 ratio         PTT - ( 10 Dec 2022 10:52 )  PTT:43.7 sec  ABG - ( 10 Dec 2022 15:15 )  pH, Arterial: 7.44  pH, Blood: x     /  pCO2: 32    /  pO2: 81    / HCO3: 22    / Base Excess: -2.0  /  SaO2: 97.5                  ==================================================    OBJECTIVE:  Vital Signs Last 24 Hrs  T(C): 36.3 (10 Dec 2022 16:00), Max: 38.1 (10 Dec 2022 00:00)  T(F): 97.3 (10 Dec 2022 16:00), Max: 100.6 (10 Dec 2022 00:00)  HR: 73 (10 Dec 2022 18:45) (73 - 124)  BP: --  BP(mean): --  RR: 30 (10 Dec 2022 18:45) (2 - 30)  SpO2: 97% (10 Dec 2022 18:45) (91% - 100%)    Parameters below as of 10 Dec 2022 16:00  Patient On (Oxygen Delivery Method): ventilator    O2 Concentration (%): 40    REVIEW OF SYSTEMS:   [x ] N/A    Physical Exam:  General: Intubated, multiple lines gtt  Neurology: on/off sedation  Eyes: bilateral pupils equal and reactive   ENT/Neck: +ETT midline, Neck supple, trachea midline, No JVD   Respiratory: rhonchi bilaterally   CV: S1S2, no murmurs        [x] Sinus Tachycardia   Abdominal: Softly distended,+BS   Extremities: 1+ pedal edema noted, + peripheral pulses palpable, warm  Skin: No Rashes, Hematoma, Ecchymosis                          Assessment:  63 y/o male with PMH of CABG, DM, HTN, HLD presenting as transfer from [place] for c/f STEMI. PTA arrival received 325 aspirin, 600 plavix, and no heparin drip started. Around 1:30 am patient had multiple episodes of non-bloody diarrhea and emesis with associated abdominal pain and chest pain, unable to localize, non-radiating, with associated SOB and no associated palpitations or diaphoresis. No recent fevers/chills, cough, rashes, or changes in urination. Does report mild diffuse back pain; started 5 days ago in setting on injury while walking and lifting objects. Denies focal weakness, numbness/tingling, or LOC due does report mild dizziness.    acute respiratory distress/failure, intubated ,   cardiogenic shock s/p VA ECMO decannulated 12/8  CAD/ASHD/CABG, acute MI  cardiogenic shock  Hemodynamic instability  acute renal failure  encounter management IABP  encounter weaning ventilator  Leukocytosis   Anemia  acute pancreatitis    Plan:   ***Neuro***  CT head showed 4mm subdural hematoma 11/26: repeat CT head unchanged 12/01,  [x] Sedated with [x] Precedex for vent synchrony- on/off  Post operative neuro assessment   Soft palate laceration, ENT scoped 12/8, stable, no acute intervention at this time      ***Cardiovascular***  12/7 TTE turndown EF 30-40%, normal RV, MR mod   12/8 GRZEGORZ EF 40-45%, MR, TR, normal RV function  Invasive hemodynamic monitoring, assess perfusion indices   ST / CVP 40/ MAP 77/Hct 27.0 Lactate 1.4  [x] Primacor- 0.2 mcgs/kg/min   [x] Amiodarone- 0.5 mg/min   [x] Levophed 0.02 mcg/kg/hr [x] Vasopressin 0.07 U/Min   [x] 12/7 cath patent LIMA graft and other grafts occluded, femoral IABP placed, with good augmentation @ 1:1,  [x] 12/8 ECMO d/c'd  Continuos reassessment of hemodynamics   12/8 Aflutter s/p DCCV x2  [x] AC Therapy with Argatroban gtt pAF/flutter ptt 40-45  [x] Statin   11/29 HIT NG, MARY pending, f/u result      ***Pulmonary***  Post op vent management   Titration of FiO2 and PEEP, follow SpO2, CXR, blood gasses   CPAP this morning--> Held off on extubating   Plan for extubation tomorrow/ Monday       Mode: AC/ CMV (Assist Control/ Continuous Mandatory Ventilation)  RR (machine): 14  FiO2: 40  PEEP: 5  ITime: 1  MAP: 12  PC: 18  PIP: 26            Will plan to wean & extubate once pt is hemodynamically stable and not bleeding    ***GI***  [x] NPO with TF's Glucerna 1.5 @ 0cc/hr with goal of 60cc/hr   [x] Protonix    Bowel regimen with Miralax and senna      ***Renal***  GFR 33/ ERIC, renal following CVVHD started 12/5  Continue to monitor I/Os, BUN/Creatinine.   Replete lytes PRN  De Dios present [x] positive       ***ID***  Continue Cefuroxime for perioperative antibiotic coverage  Started emperic  coverage with Vancomycin and Cefepime   Plan to CT to rule out infection     ***Endocrine***  [x]  DM2: HbA1c 7.3%                - [x] Insulin gtt              - Need tight glycemic control to prevent wound infection.            Patient requires continuous monitoring with bedside rhythm monitoring, pulse oximetry monitoring, and continuous central venous and arterial pressure monitoring; and intermittent blood gas analysis. Care plan discussed with the ICU care team.   Patient remained critical, at risk for life threatening decompensation.    By signing my name below, I, Leela Avila, attest that this documentation has been prepared under the direction and in the presence of Kylah Chamorro NP   Electronically signed: Brian Oakes, 12-10-22 @ 19:10    I, Kylah Chamorro, personally performed the services described in this documentation. all medical record entries made by the karlaibe were at my direction and in my presence. I have reviewed the chart and agree that the record reflects my personal performance and is accurate and complete  Electronically signed: Kylah Chamorro NP  Patient seen and examined at the bedside.    Remained critically ill on continuous ICU monitoring.    =================== LABS =========================                        9.5    17.94 )-----------( 150      ( 10 Dec 2022 00:47 )             29.0     12-10    136  |  103  |  36<H>  ----------------------------<  113<H>  4.6   |  21<L>  |  2.19<H>    Ca    7.5<L>      10 Dec 2022 00:47  Phos  3.4     12-10  Mg     2.5     12-10    TPro  6.0  /  Alb  2.9<L>  /  TBili  0.9  /  DBili  x   /  AST  55<H>  /  ALT  15  /  AlkPhos  171<H>  12-10    LIVER FUNCTIONS - ( 10 Dec 2022 00:47 )  Alb: 2.9 g/dL / Pro: 6.0 g/dL / ALK PHOS: 171 U/L / ALT: 15 U/L / AST: 55 U/L / GGT: x           PT/INR - ( 10 Dec 2022 10:52 )   PT: 19.0 sec;   INR: 1.64 ratio         PTT - ( 10 Dec 2022 10:52 )  PTT:43.7 sec  ABG - ( 10 Dec 2022 15:15 )  pH, Arterial: 7.44  pH, Blood: x     /  pCO2: 32    /  pO2: 81    / HCO3: 22    / Base Excess: -2.0  /  SaO2: 97.5                  ==================================================    OBJECTIVE:  Vital Signs Last 24 Hrs  T(C): 36.3 (10 Dec 2022 16:00), Max: 38.1 (10 Dec 2022 00:00)  T(F): 97.3 (10 Dec 2022 16:00), Max: 100.6 (10 Dec 2022 00:00)  HR: 73 (10 Dec 2022 18:45) (73 - 124)  BP: --  BP(mean): --  RR: 30 (10 Dec 2022 18:45) (2 - 30)  SpO2: 97% (10 Dec 2022 18:45) (91% - 100%)    Parameters below as of 10 Dec 2022 16:00  Patient On (Oxygen Delivery Method): ventilator    O2 Concentration (%): 40    REVIEW OF SYSTEMS:   [x ] N/A    Physical Exam:  General: Intubated, multiple lines gtt  Neurology: on/off sedation  Eyes: bilateral pupils equal and reactive   ENT/Neck: +ETT midline, Neck supple, trachea midline, No JVD   Respiratory: rhonchi bilaterally   CV: S1S2, no murmurs        [x] Sinus Tachycardia   Abdominal: Softly distended,+BS   Extremities: 1+ pedal edema noted, + peripheral pulses palpable, warm  Skin: No Rashes, Hematoma, Ecchymosis                          Assessment:  63 y/o male with PMH of CABG, DM, HTN, HLD presenting as transfer from [place] for c/f STEMI. PTA arrival received 325 aspirin, 600 plavix, and no heparin drip started. Around 1:30 am patient had multiple episodes of non-bloody diarrhea and emesis with associated abdominal pain and chest pain, unable to localize, non-radiating, with associated SOB and no associated palpitations or diaphoresis. No recent fevers/chills, cough, rashes, or changes in urination. Does report mild diffuse back pain; started 5 days ago in setting on injury while walking and lifting objects. Denies focal weakness, numbness/tingling, or LOC due does report mild dizziness.    acute respiratory distress/failure, intubated ,   cardiogenic shock s/p VA ECMO decannulated 12/8  CAD/ASHD/CABG, acute MI  cardiogenic shock  Hemodynamic instability  acute renal failure  encounter management IABP  encounter weaning ventilator  Leukocytosis   Anemia  acute pancreatitis    Plan:   ***Neuro***  CT head showed 4mm subdural hematoma 11/26: repeat CT head unchanged 12/01,  [x] Sedated with [x] Precedex for vent synchrony- on/off  Post operative neuro assessment   Soft palate laceration, ENT scoped 12/8, stable, no acute intervention at this time      ***Cardiovascular***  12/7 TTE turndown EF 30-40%, normal RV, MR mod   12/8 GRZEGORZ EF 40-45%, MR, TR, normal RV function  Invasive hemodynamic monitoring, assess perfusion indices   ST / CVP 40/ MAP 77/Hct 27.0/ SvO2 35/ Lactate 1.4  [x] Primacor- 0.2 mcgs/kg/min   [x] Amiodarone- 0.5 mg/min   [x] Levophed 0.02 mcg/kg/hr [x] Vasopressin 0.07 U/Min   [x] 12/7 cath patent LIMA graft and other grafts occluded, femoral IABP placed, with good augmentation @ 1:1,  [x] 12/8 ECMO d/c'd  Continuos reassessment of hemodynamics   12/8 Aflutter s/p DCCV x2  [x] AC Therapy with Argatroban gtt pAF/flutter ptt 40-45  [x] Statin   11/29 HIT NG, MARY pending, f/u result      ***Pulmonary***  Post op vent management   Titration of FiO2 and PEEP, follow SpO2, CXR, blood gasses   CPAP this morning--> Held off on extubating   Plan for extubation tomorrow/ Monday       Mode: AC/ CMV (Assist Control/ Continuous Mandatory Ventilation)  RR (machine): 14  FiO2: 40  PEEP: 5  ITime: 1  MAP: 12  PC: 18  PIP: 26            Will plan to wean & extubate once pt is hemodynamically stable and not bleeding    ***GI***  [x] NPO with TF's Glucerna 1.5 @ 0cc/hr with goal of 60cc/hr   [x] Protonix    Bowel regimen with Miralax and senna      ***Renal***  GFR 33/ ERIC, renal following CVVHD started 12/5  Continue to monitor I/Os, BUN/Creatinine.   Replete lytes PRN  De Dios present [x] positive       ***ID***  Continue Cefuroxime for perioperative antibiotic coverage  Started emperic  coverage with Vancomycin and Cefepime   Plan to CT to rule out infection     ***Endocrine***  [x]  DM2: HbA1c 7.3%                - [x] Insulin gtt              - Need tight glycemic control to prevent wound infection.            Patient requires continuous monitoring with bedside rhythm monitoring, pulse oximetry monitoring, and continuous central venous and arterial pressure monitoring; and intermittent blood gas analysis. Care plan discussed with the ICU care team.   Patient remained critical, at risk for life threatening decompensation.    By signing my name below, I, Leela Avila, attest that this documentation has been prepared under the direction and in the presence of Kylah Chamorro NP   Electronically signed: Brian Oakes, 12-10-22 @ 19:10    I, Kylah Chamorro, personally performed the services described in this documentation. all medical record entries made by the karlaibe were at my direction and in my presence. I have reviewed the chart and agree that the record reflects my personal performance and is accurate and complete  Electronically signed: Kylah Chamoror NP  Patient seen and examined at the bedside.    Remained critically ill on continuous ICU monitoring.    =================== LABS =========================                        9.5    17.94 )-----------( 150      ( 10 Dec 2022 00:47 )             29.0     12-10    136  |  103  |  36<H>  ----------------------------<  113<H>  4.6   |  21<L>  |  2.19<H>    Ca    7.5<L>      10 Dec 2022 00:47  Phos  3.4     12-10  Mg     2.5     12-10    TPro  6.0  /  Alb  2.9<L>  /  TBili  0.9  /  DBili  x   /  AST  55<H>  /  ALT  15  /  AlkPhos  171<H>  12-10    LIVER FUNCTIONS - ( 10 Dec 2022 00:47 )  Alb: 2.9 g/dL / Pro: 6.0 g/dL / ALK PHOS: 171 U/L / ALT: 15 U/L / AST: 55 U/L / GGT: x           PT/INR - ( 10 Dec 2022 10:52 )   PT: 19.0 sec;   INR: 1.64 ratio         PTT - ( 10 Dec 2022 10:52 )  PTT:43.7 sec  ABG - ( 10 Dec 2022 15:15 )  pH, Arterial: 7.44  pH, Blood: x     /  pCO2: 32    /  pO2: 81    / HCO3: 22    / Base Excess: -2.0  /  SaO2: 97.5                  ==================================================    OBJECTIVE:  Vital Signs Last 24 Hrs  T(C): 36.3 (10 Dec 2022 16:00), Max: 38.1 (10 Dec 2022 00:00)  T(F): 97.3 (10 Dec 2022 16:00), Max: 100.6 (10 Dec 2022 00:00)  HR: 73 (10 Dec 2022 18:45) (73 - 124)  BP: --  BP(mean): --  RR: 30 (10 Dec 2022 18:45) (2 - 30)  SpO2: 97% (10 Dec 2022 18:45) (91% - 100%)    Parameters below as of 10 Dec 2022 16:00  Patient On (Oxygen Delivery Method): ventilator    O2 Concentration (%): 40    REVIEW OF SYSTEMS:   [x ] N/A    Physical Exam:  General: Intubated, multiple lines gtt  Neurology: on/off sedation  Eyes: bilateral pupils equal and reactive   ENT/Neck: +ETT midline, Neck supple, trachea midline, No JVD   Respiratory: rhonchi bilaterally   CV: S1S2, no murmurs        [x] Sinus Tachycardia   Abdominal: Softly distended,+BS   Extremities: 1+ pedal edema noted, + peripheral pulses palpable, warm  Skin: No Rashes, Hematoma, Ecchymosis                          Assessment:  61 y/o male with PMH of CABG, DM, HTN, HLD presenting as transfer from [place] for c/f STEMI. PTA arrival received 325 aspirin, 600 plavix, and no heparin drip started. Around 1:30 am patient had multiple episodes of non-bloody diarrhea and emesis with associated abdominal pain and chest pain, unable to localize, non-radiating, with associated SOB and no associated palpitations or diaphoresis. No recent fevers/chills, cough, rashes, or changes in urination. Does report mild diffuse back pain; started 5 days ago in setting on injury while walking and lifting objects. Denies focal weakness, numbness/tingling, or LOC due does report mild dizziness.    acute respiratory distress/failure, intubated ,   cardiogenic shock s/p [facility] ECMO decannulated 12/8  CAD/ASHD/CABG, acute MI  cardiogenic shock  Hemodynamic instability  acute renal failure  encounter management IABP  encounter weaning ventilator  Leukocytosis   Anemia  acute pancreatitis    Plan:   ***Neuro***  CT head showed 4mm subdural hematoma 11/26: repeat CT head unchanged 12/01,  [x] Sedated with [x] Precedex for vent synchrony- on/off  Post operative neuro assessment   Soft palate laceration, ENT scoped 12/8, stable, no acute intervention at this time      ***Cardiovascular***  12/7 TTE turndown EF 30-40%, normal RV, MR mod   12/8 GRZEGORZ EF 40-45%, MR, TR, normal RV function  Invasive hemodynamic monitoring, assess perfusion indices   ST / CVP 40/ MAP 77/Hct 27.0/ SvO2 35/ Lactate 1.4  [x] Primacor- 0.2 mcgs/kg/min   [x] Amiodarone- 0.5 mg/min   [x] Levophed 0.02 mcg/kg/hr [x] Vasopressin 0.07 U/Min   [x] 12/7 cath patent LIMA graft and other grafts occluded, femoral IABP placed, with good augmentation @ 1:1,  [x] 12/8 ECMO d/c'd  Continuos reassessment of hemodynamics   12/8 Aflutter s/p DCCV x2  [x] AC Therapy with Argatroban gtt pAF/flutter ptt 40-45  [x] Statin   11/29 HIT NG, MARY pending, f/u result      ***Pulmonary***  Post op vent management   Titration of FiO2 and PEEP, follow SpO2, CXR, blood gasses   CPAP this morning--> Held off on extubating   Plan for extubation tomorrow/ Monday       Mode: AC/ CMV (Assist Control/ Continuous Mandatory Ventilation)  RR (machine): 14  FiO2: 40  PEEP: 5  ITime: 1  MAP: 12  PC: 18  PIP: 26            Will plan to wean & extubate once pt is hemodynamically stable and not bleeding    ***GI***  [x] NPO with TF's Glucerna 1.5 @ 0cc/hr with goal of 60cc/hr   [x] Protonix    Bowel regimen with Miralax and senna      ***Renal***  GFR 33/ ERIC, renal following CVVHD started 12/5  Continue to monitor I/Os, BUN/Creatinine.   Replete lytes PRN  De Dios present [x] positive       ***ID***  Continue Cefuroxime for perioperative antibiotic coverage  Started emperic  coverage with Vancomycin and Cefepime   Plan to CT to rule out infection     ***Endocrine***  [x]  DM2: HbA1c 7.3%                - [x] Insulin gtt              - Need tight glycemic control to prevent wound infection.            Patient requires continuous monitoring with bedside rhythm monitoring, pulse oximetry monitoring, and continuous central venous and arterial pressure monitoring; and intermittent blood gas analysis. Care plan discussed with the ICU care team.   Patient remained critical, at risk for life threatening decompensation.    By signing my name below, I, Leela Avila, attest that this documentation has been prepared under the direction and in the presence of Kylah Chamorro NP   Electronically signed: Brian Oakes, 12-10-22 @ 19:10    I, Kylah Chamorro, personally performed the services described in this documentation. all medical record entries made by the karlaibe were at my direction and in my presence. I have reviewed the chart and agree that the record reflects my personal performance and is accurate and complete  Electronically signed: Kylah Chamorro NP  Patient seen and examined at the bedside.    Remained critically ill on continuous ICU monitoring.    =================== LABS =========================                        9.5    17.94 )-----------( 150      ( 10 Dec 2022 00:47 )             29.0     12-10    136  |  103  |  36<H>  ----------------------------<  113<H>  4.6   |  21<L>  |  2.19<H>    Ca    7.5<L>      10 Dec 2022 00:47  Phos  3.4     12-10  Mg     2.5     12-10    TPro  6.0  /  Alb  2.9<L>  /  TBili  0.9  /  DBili  x   /  AST  55<H>  /  ALT  15  /  AlkPhos  171<H>  12-10    LIVER FUNCTIONS - ( 10 Dec 2022 00:47 )  Alb: 2.9 g/dL / Pro: 6.0 g/dL / ALK PHOS: 171 U/L / ALT: 15 U/L / AST: 55 U/L / GGT: x           PT/INR - ( 10 Dec 2022 10:52 )   PT: 19.0 sec;   INR: 1.64 ratio         PTT - ( 10 Dec 2022 10:52 )  PTT:43.7 sec  ABG - ( 10 Dec 2022 15:15 )  pH, Arterial: 7.44  pH, Blood: x     /  pCO2: 32    /  pO2: 81    / HCO3: 22    / Base Excess: -2.0  /  SaO2: 97.5                  ==================================================    OBJECTIVE:  Vital Signs Last 24 Hrs  T(C): 36.3 (10 Dec 2022 16:00), Max: 38.1 (10 Dec 2022 00:00)  T(F): 97.3 (10 Dec 2022 16:00), Max: 100.6 (10 Dec 2022 00:00)  HR: 73 (10 Dec 2022 18:45) (73 - 124)  BP: --  BP(mean): --  RR: 30 (10 Dec 2022 18:45) (2 - 30)  SpO2: 97% (10 Dec 2022 18:45) (91% - 100%)    Parameters below as of 10 Dec 2022 16:00  Patient On (Oxygen Delivery Method): ventilator    O2 Concentration (%): 40    REVIEW OF SYSTEMS:   [x ] N/A    Physical Exam:  General: Intubated, multiple lines gtt  Neurology: on/off sedation  Eyes: bilateral pupils equal and reactive   ENT/Neck: +ETT midline, Neck supple, trachea midline, No JVD   Respiratory: rhonchi bilaterally   CV: S1S2, no murmurs        [x] Sinus Tachycardia   Abdominal: Softly distended,+BS   Extremities: 1+ pedal edema noted, + peripheral pulses palpable, warm  Skin: No Rashes, Hematoma, Ecchymosis                          Assessment:  63 y/o male with PMH of CABG, DM, HTN, HLD presenting as transfer from [place] for c/f STEMI. PTA arrival received 325 aspirin, 600 plavix, and no heparin drip started. Around 1:30 am patient had multiple episodes of non-bloody diarrhea and emesis with associated abdominal pain and chest pain, unable to localize, non-radiating, with associated SOB and no associated palpitations or diaphoresis. No recent fevers/chills, cough, rashes, or changes in urination. Does report mild diffuse back pain; started 5 days ago in setting on injury while walking and lifting objects. Denies focal weakness, numbness/tingling, or LOC due does report mild dizziness.    acute respiratory distress/failure, intubated ,   cardiogenic shock s/p VA ECMO decannulated 12/8  CAD/ASHD/CABG, acute MI  cardiogenic shock  Hemodynamic instability  acute renal failure  encounter management IABP  encounter weaning ventilator  Leukocytosis   Anemia  acute pancreatitis    Plan:   ***Neuro***  CT head showed 4mm subdural hematoma 11/26: repeat CT head unchanged 12/01,  [x] Sedated with [x] Precedex for vent synchrony- on/off  Post operative neuro assessment   Soft palate laceration, ENT scoped 12/8, stable, no acute intervention at this time      ***Cardiovascular***  12/7 TTE turndown EF 30-40%, normal RV, MR mod   12/8 GRZEGORZ EF 40-45%, MR, TR, normal RV function  Invasive hemodynamic monitoring, assess perfusion indices   ST / CVP 40/ MAP 77/Hct 27.0/ SvO2 35/ Lactate 1.4  [x] Primacor- 0.2 mcgs/kg/min   [x] Amiodarone- 0.5 mg/min   [x] Levophed 0.02 mcg/kg/hr [x] Vasopressin 0.07 U/Min   [x] 12/7 cath patent LIMA graft and other grafts occluded, femoral IABP placed, with good augmentation @ 1:1,  [x] 12/8 ECMO d/c'd  Continuos reassessment of hemodynamics   12/8 Aflutter s/p DCCV x2  [x] AC Therapy with Argatroban gtt pAF/flutter ptt 40-45  [x] Statin   11/29 HIT NG, MARY pending, f/u result      ***Pulmonary***  Post op vent management   Titration of FiO2 and PEEP, follow SpO2, CXR, blood gasses   CPAP this morning--> Held off on extubating   Plan for extubation tomorrow/ Monday       Mode: AC/ CMV (Assist Control/ Continuous Mandatory Ventilation)  RR (machine): 14  FiO2: 40  PEEP: 5  ITime: 1  MAP: 12  PC: 18  PIP: 26            Will plan to wean & extubate once pt is hemodynamically stable and not bleeding    ***GI***  [x] NPO with TF's Glucerna 1.5 @ 0cc/hr with goal of 60cc/hr   [x] Protonix    Bowel regimen with Miralax and senna      ***Renal***  GFR 33/ ERIC, renal following CVVHD started 12/5  Continue to monitor I/Os, BUN/Creatinine.   Replete lytes PRN  De Dios present [x] positive       ***ID***  Continue Cefuroxime for perioperative antibiotic coverage  Started emperic  coverage with Vancomycin and Cefepime   Plan to CT to rule out infection     ***Endocrine***  [x]  DM2: HbA1c 7.3%                - [x] Insulin gtt              - Need tight glycemic control to prevent wound infection.            Patient requires continuous monitoring with bedside rhythm monitoring, pulse oximetry monitoring, and continuous central venous and arterial pressure monitoring; and intermittent blood gas analysis. Care plan discussed with the ICU care team.   Patient remained critical, at risk for life threatening decompensation.    By signing my name below, I, Leela Avila, attest that this documentation has been prepared under the direction and in the presence of Kylah Chamorro NP   Electronically signed: Brian Oakes, 12-10-22 @ 19:10    I, Kylah Chamorro, personally performed the services described in this documentation. all medical record entries made by the karlaibe were at my direction and in my presence. I have reviewed the chart and agree that the record reflects my personal performance and is accurate and complete  Electronically signed: Kylah Chamorro NP  Patient seen and examined at the bedside.    Remained critically ill on continuous ICU monitoring.    =================== LABS =========================                        9.5    17.94 )-----------( 150      ( 10 Dec 2022 00:47 )             29.0     12-10    136  |  103  |  36<H>  ----------------------------<  113<H>  4.6   |  21<L>  |  2.19<H>    Ca    7.5<L>      10 Dec 2022 00:47  Phos  3.4     12-10  Mg     2.5     12-10    TPro  6.0  /  Alb  2.9<L>  /  TBili  0.9  /  DBili  x   /  AST  55<H>  /  ALT  15  /  AlkPhos  171<H>  12-10    LIVER FUNCTIONS - ( 10 Dec 2022 00:47 )  Alb: 2.9 g/dL / Pro: 6.0 g/dL / ALK PHOS: 171 U/L / ALT: 15 U/L / AST: 55 U/L / GGT: x           PT/INR - ( 10 Dec 2022 10:52 )   PT: 19.0 sec;   INR: 1.64 ratio         PTT - ( 10 Dec 2022 10:52 )  PTT:43.7 sec  ABG - ( 10 Dec 2022 15:15 )  pH, Arterial: 7.44  pH, Blood: x     /  pCO2: 32    /  pO2: 81    / HCO3: 22    / Base Excess: -2.0  /  SaO2: 97.5                  ==================================================    OBJECTIVE:  Vital Signs Last 24 Hrs  T(C): 36.3 (10 Dec 2022 16:00), Max: 38.1 (10 Dec 2022 00:00)  T(F): 97.3 (10 Dec 2022 16:00), Max: 100.6 (10 Dec 2022 00:00)  HR: 73 (10 Dec 2022 18:45) (73 - 124)  BP: --  BP(mean): --  RR: 30 (10 Dec 2022 18:45) (2 - 30)  SpO2: 97% (10 Dec 2022 18:45) (91% - 100%)    Parameters below as of 10 Dec 2022 16:00  Patient On (Oxygen Delivery Method): ventilator    O2 Concentration (%): 40    REVIEW OF SYSTEMS:   [x ] N/A    Physical Exam:  General: Intubated, multiple lines gtt  Neurology: on/off sedation  Eyes: bilateral pupils equal and reactive   ENT/Neck: +ETT midline, Neck supple, trachea midline, No JVD   Respiratory: rhonchi bilaterally   CV: S1S2, no murmurs        [x] Sinus Tachycardia   Abdominal: Softly distended,+BS   Extremities: 1+ pedal edema noted, + peripheral pulses palpable, warm  Skin: No Rashes, Hematoma, Ecchymosis                          Assessment:  63 y/o male with PMH of CABG, DM, HTN, HLD presenting as transfer from [place] for c/f STEMI. PTA arrival received 325 aspirin, 600 plavix, and no heparin drip started. Around 1:30 am patient had multiple episodes of non-bloody diarrhea and emesis with associated abdominal pain and chest pain, unable to localize, non-radiating, with associated SOB and no associated palpitations or diaphoresis. No recent fevers/chills, cough, rashes, or changes in urination. Does report mild diffuse back pain; started 5 days ago in setting on injury while walking and lifting objects. Denies focal weakness, numbness/tingling, or LOC due does report mild dizziness.    acute respiratory distress/failure, intubated ,   cardiogenic shock s/p VA ECMO decannulated 12/8  CAD/ASHD/CABG, acute MI  cardiogenic shock  Hemodynamic instability  acute renal failure  encounter management IABP  encounter weaning ventilator  Leukocytosis   Anemia  acute pancreatitis    Plan:   ***Neuro***  CT head showed 4mm subdural hematoma 11/26: repeat CT head unchanged 12/01,  [x] Sedated with [x] Precedex for vent synchrony- on/off  Post operative neuro assessment   Soft palate laceration, ENT scoped 12/8, stable, no acute intervention at this time      ***Cardiovascular***  12/7 TTE turndown EF 30-40%, normal RV, MR mod   12/8 GRZEGORZ EF 40-45%, MR, TR, normal RV function  Invasive hemodynamic monitoring, assess perfusion indices   ST / CVP 40/ MAP 77/Hct 27.0/ SvO2 35/ Lactate 1.4  [x] Primacor- 0.2 mcgs/kg/min   [x] Amiodarone- 0.5 mg/min   [x] Levophed 0.02 mcg/kg/hr [x] Vasopressin 0.07 U/Min   [x] 12/7 cath patent LIMA graft and other grafts occluded, femoral IABP placed, with good augmentation @ 1:1,  [x] 12/8 ECMO d/c'd  Continuos reassessment of hemodynamics   12/8 Aflutter s/p DCCV x2  [x] AC Therapy with Argatroban gtt pAF/flutter ptt 40-45  [x] Statin   11/29 HIT NG, 12/8 MARY Neg      ***Pulmonary***  Post op vent management   Titration of FiO2 and PEEP, follow SpO2, CXR, blood gasses   CPAP this morning--> Held off on extubating   Plan for extubation tomorrow/ Monday       Mode: AC/ CMV (Assist Control/ Continuous Mandatory Ventilation)  RR (machine): 14  FiO2: 40  PEEP: 5  ITime: 1  MAP: 12  PC: 18  PIP: 26            Will plan to wean & extubate once pt is hemodynamically stable and not bleeding    ***GI***  [x] NPO with TF's Glucerna 1.5 @ 0cc/hr with goal of 60cc/hr   [x] Protonix    Bowel regimen with Miralax and senna      ***Renal***  GFR 33/ ERIC, renal following CVVHD started 12/5  Continue to monitor I/Os, BUN/Creatinine.   Replete lytes PRN  De Dios present [x] positive       ***ID***  Continue Cefuroxime for perioperative antibiotic coverage  Started emperic  coverage with Vancomycin and Cefepime   Plan to CT to rule out infection     ***Endocrine***  [x]  DM2: HbA1c 7.3%                - [x] Insulin gtt              - Need tight glycemic control to prevent wound infection.            Patient requires continuous monitoring with bedside rhythm monitoring, pulse oximetry monitoring, and continuous central venous and arterial pressure monitoring; and intermittent blood gas analysis. Care plan discussed with the ICU care team.   Patient remained critical, at risk for life threatening decompensation.    By signing my name below, I, Leela Avila, attest that this documentation has been prepared under the direction and in the presence of Kylah Chamorro NP   Electronically signed: Brian Oakes, 12-10-22 @ 19:10    I, Kylah Chamorro, personally performed the services described in this documentation. all medical record entries made by the karlaibe were at my direction and in my presence. I have reviewed the chart and agree that the record reflects my personal performance and is accurate and complete  Electronically signed: Kylah Chamorro NP  Patient seen and examined at the bedside.    Remained critically ill on continuous ICU monitoring.    =================== LABS =========================                        9.5    17.94 )-----------( 150      ( 10 Dec 2022 00:47 )             29.0     12-10    136  |  103  |  36<H>  ----------------------------<  113<H>  4.6   |  21<L>  |  2.19<H>    Ca    7.5<L>      10 Dec 2022 00:47  Phos  3.4     12-10  Mg     2.5     12-10    TPro  6.0  /  Alb  2.9<L>  /  TBili  0.9  /  DBili  x   /  AST  55<H>  /  ALT  15  /  AlkPhos  171<H>  12-10    LIVER FUNCTIONS - ( 10 Dec 2022 00:47 )  Alb: 2.9 g/dL / Pro: 6.0 g/dL / ALK PHOS: 171 U/L / ALT: 15 U/L / AST: 55 U/L / GGT: x           PT/INR - ( 10 Dec 2022 10:52 )   PT: 19.0 sec;   INR: 1.64 ratio         PTT - ( 10 Dec 2022 10:52 )  PTT:43.7 sec  ABG - ( 10 Dec 2022 15:15 )  pH, Arterial: 7.44  pH, Blood: x     /  pCO2: 32    /  pO2: 81    / HCO3: 22    / Base Excess: -2.0  /  SaO2: 97.5                  ==================================================    OBJECTIVE:  Vital Signs Last 24 Hrs  T(C): 36.3 (10 Dec 2022 16:00), Max: 38.1 (10 Dec 2022 00:00)  T(F): 97.3 (10 Dec 2022 16:00), Max: 100.6 (10 Dec 2022 00:00)  HR: 73 (10 Dec 2022 18:45) (73 - 124)  BP: --  BP(mean): --  RR: 30 (10 Dec 2022 18:45) (2 - 30)  SpO2: 97% (10 Dec 2022 18:45) (91% - 100%)    Parameters below as of 10 Dec 2022 16:00  Patient On (Oxygen Delivery Method): ventilator    O2 Concentration (%): 40    REVIEW OF SYSTEMS:   [x ] N/A    Physical Exam:  General: Intubated, multiple lines gtt  Neurology: on/off sedation  Eyes: bilateral pupils equal and reactive   ENT/Neck: +ETT midline, Neck supple, trachea midline, No JVD   Respiratory: rhonchi bilaterally   CV: S1S2, no murmurs        [x] Sinus Tachycardia   Abdominal: Softly distended,+BS   Extremities: 1+ pedal edema noted, + peripheral pulses palpable, warm  Skin: No Rashes, Hematoma, Ecchymosis                          Assessment:  63 y/o male with PMH of CABG, DM, HTN, HLD presenting as transfer from [place] for c/f STEMI. PTA arrival received 325 aspirin, 600 plavix, and no heparin drip started. Around 1:30 am patient had multiple episodes of non-bloody diarrhea and emesis with associated abdominal pain and chest pain, unable to localize, non-radiating, with associated SOB and no associated palpitations or diaphoresis. No recent fevers/chills, cough, rashes, or changes in urination. Does report mild diffuse back pain; started 5 days ago in setting on injury while walking and lifting objects. Denies focal weakness, numbness/tingling, or LOC due does report mild dizziness.    acute respiratory distress/failure, intubated ,   cardiogenic shock s/p VA ECMO decannulated 12/8  CAD/ASHD/CABG, acute MI  cardiogenic shock  Hemodynamic instability  acute renal failure  encounter management IABP  encounter weaning ventilator  Leukocytosis   Anemia  acute pancreatitis    Plan:   ***Neuro***  CT head showed 4mm subdural hematoma 11/26: repeat CT head unchanged 12/01,  [x] Sedated with [x] Precedex for vent synchrony- on/off  Post operative neuro assessment   Soft palate laceration, ENT scoped 12/8, stable, no acute intervention at this time      ***Cardiovascular***  12/7 TTE turndown EF 30-40%, normal RV, MR mod   12/8 GRZEGORZ EF 40-45%, MR, TR, normal RV function  Invasive hemodynamic monitoring, assess perfusion indices   ST / CVP 40/ MAP 77/Hct 27.0/ SvO2 35/ Lactate 1.4  [x] Primacor- 0.2 mcgs/kg/min   [x] Amiodarone- 0.5 mg/min   [x] Levophed 0.02 mcg/kg/hr [x] Vasopressin 0.07 U/Min   [x] 12/7 cath patent LIMA graft and other grafts occluded, femoral IABP placed, with good augmentation @ 1:1,  [x] 12/8 ECMO d/c'd  Continuos reassessment of hemodynamics   12/8 Aflutter s/p DCCV x2  [x] AC Therapy with Argatroban gtt pAF/flutter ptt 40-45  [x] Statin   11/29 HIT NG, 12/8 MARY Neg      ***Pulmonary***  Post op vent management   Titration of FiO2 and PEEP, follow SpO2, CXR, blood gasses   CPAP this morning--> Held off on extubating   Plan for extubation tomorrow/ Monday       Mode: AC/ CMV (Assist Control/ Continuous Mandatory Ventilation)  RR (machine): 14  FiO2: 40  PEEP: 5  ITime: 1  MAP: 12  PC: 18  PIP: 26            Will plan to wean & extubate once pt is hemodynamically stable and not bleeding    ***GI***  [x] NPO with TF's Glucerna 1.5 @ 0cc/hr with goal of 60cc/hr   [x] Protonix    Bowel regimen with Miralax and senna      ***Renal***  GFR 33/ ERIC, renal following CVVHD started 12/5  Continue to monitor I/Os, BUN/Creatinine.   Replete lytes PRN  De Dios present [x] positive       ***ID***  Continue Cefuroxime for perioperative antibiotic coverage  Started emperic  coverage with Vancomycin and Cefepime   Plan to CT to rule out infection     ***Endocrine***  [x]  DM2: HbA1c 7.3%                - [x] Insulin gtt              - Need tight glycemic control to prevent wound infection.            Patient requires continuous monitoring with bedside rhythm monitoring, pulse oximetry monitoring, and continuous central venous and arterial pressure monitoring; and intermittent blood gas analysis. Care plan discussed with the ICU care team.   Patient remained critical, at risk for life threatening decompensation.    By signing my name below, I, Leela Avila, attest that this documentation has been prepared under the direction and in the presence of Kylah Chamorro NP   Electronically signed: Brian Oakes, 12-10-22 @ 19:10    I, Kylah Chamorro, personally performed the services described in this documentation. all medical record entries made by the karlaibe were at my direction and in my presence. I have reviewed the chart and agree that the record reflects my personal performance and is accurate and complete  Electronically signed: Kylah Chamorro NP  Patient seen and examined at the bedside.    Remained critically ill on continuous ICU monitoring.    =================== LABS =========================                        9.5    17.94 )-----------( 150      ( 10 Dec 2022 00:47 )             29.0     12-10    136  |  103  |  36<H>  ----------------------------<  113<H>  4.6   |  21<L>  |  2.19<H>    Ca    7.5<L>      10 Dec 2022 00:47  Phos  3.4     12-10  Mg     2.5     12-10    TPro  6.0  /  Alb  2.9<L>  /  TBili  0.9  /  DBili  x   /  AST  55<H>  /  ALT  15  /  AlkPhos  171<H>  12-10    LIVER FUNCTIONS - ( 10 Dec 2022 00:47 )  Alb: 2.9 g/dL / Pro: 6.0 g/dL / ALK PHOS: 171 U/L / ALT: 15 U/L / AST: 55 U/L / GGT: x           PT/INR - ( 10 Dec 2022 10:52 )   PT: 19.0 sec;   INR: 1.64 ratio         PTT - ( 10 Dec 2022 10:52 )  PTT:43.7 sec  ABG - ( 10 Dec 2022 15:15 )  pH, Arterial: 7.44  pH, Blood: x     /  pCO2: 32    /  pO2: 81    / HCO3: 22    / Base Excess: -2.0  /  SaO2: 97.5                  ==================================================    OBJECTIVE:  Vital Signs Last 24 Hrs  T(C): 36.3 (10 Dec 2022 16:00), Max: 38.1 (10 Dec 2022 00:00)  T(F): 97.3 (10 Dec 2022 16:00), Max: 100.6 (10 Dec 2022 00:00)  HR: 73 (10 Dec 2022 18:45) (73 - 124)  BP: --  BP(mean): --  RR: 30 (10 Dec 2022 18:45) (2 - 30)  SpO2: 97% (10 Dec 2022 18:45) (91% - 100%)    Parameters below as of 10 Dec 2022 16:00  Patient On (Oxygen Delivery Method): ventilator    O2 Concentration (%): 40    REVIEW OF SYSTEMS:   [x ] N/A    Physical Exam:  General: Intubated, multiple lines gtt  Neurology: on/off sedation  Eyes: bilateral pupils equal and reactive   ENT/Neck: +ETT midline, Neck supple, trachea midline, No JVD   Respiratory: rhonchi bilaterally   CV: S1S2, no murmurs        [x] Sinus Tachycardia   Abdominal: Softly distended,+BS   Extremities: 1+ pedal edema noted, + peripheral pulses palpable, warm  Skin: No Rashes, Hematoma, Ecchymosis                          Assessment:  63 y/o male with PMH of CABG, DM, HTN, HLD presenting as transfer from [place] for c/f STEMI. PTA arrival received 325 aspirin, 600 plavix, and no heparin drip started. Around 1:30 am patient had multiple episodes of non-bloody diarrhea and emesis with associated abdominal pain and chest pain, unable to localize, non-radiating, with associated SOB and no associated palpitations or diaphoresis. No recent fevers/chills, cough, rashes, or changes in urination. Does report mild diffuse back pain; started 5 days ago in setting on injury while walking and lifting objects. Denies focal weakness, numbness/tingling, or LOC due does report mild dizziness.    acute respiratory distress/failure, intubated ,   cardiogenic shock s/p VA ECMO decannulated 12/8  CAD/ASHD/CABG, acute MI  cardiogenic shock  Hemodynamic instability  acute renal failure  encounter management IABP  encounter weaning ventilator  Leukocytosis   Anemia  acute pancreatitis    Plan:   ***Neuro***  CT head showed 4mm subdural hematoma 11/26: repeat CT head unchanged 12/01,  [x] Sedated with [x] Precedex for vent synchrony- on/off  Post operative neuro assessment   Soft palate laceration, ENT scoped 12/8, stable, no acute intervention at this time      ***Cardiovascular***  12/7 TTE turndown EF 30-40%, normal RV, MR mod   12/8 GRZEGORZ EF 40-45%, MR, TR, normal RV function  Invasive hemodynamic monitoring, assess perfusion indices   ST / CVP 40/ MAP 77/Hct 27.0/ SvO2 35/ Lactate 1.4  [x] Primacor- 0.2 mcgs/kg/min   [x] Amiodarone- 0.5 mg/min   [x] Levophed 0.02 mcg/kg/hr [x] Vasopressin 0.07 U/Min   [x] 12/7 cath patent LIMA graft and other grafts occluded, femoral IABP placed, with good augmentation @ 1:1,  [x] 12/8 ECMO d/c'd  Continuos reassessment of hemodynamics   12/8 Aflutter s/p DCCV x2  [x] AC Therapy with Argatroban gtt pAF/flutter ptt 40-45  [x] Statin   11/29 HIT NG, 12/8 MARY Neg      ***Pulmonary***  Post op vent management   Titration of FiO2 and PEEP, follow SpO2, CXR, blood gasses   CPAP this morning--> Held off on extubating   Plan for extubation tomorrow/ Monday       Mode: AC/ CMV (Assist Control/ Continuous Mandatory Ventilation)  RR (machine): 14  FiO2: 40  PEEP: 5  ITime: 1  MAP: 12  PC: 18  PIP: 26            Will plan to wean & extubate once pt is hemodynamically stable and not bleeding    ***GI***  [x] NPO with TF's Glucerna 1.5 @ 0cc/hr with goal of 60cc/hr   [x] Protonix    Bowel regimen with Miralax and senna      ***Renal***  GFR 33/ ERIC, renal following CVVHD started 12/5  Continue to monitor I/Os, BUN/Creatinine.   Replete lytes PRN  De Dios present [x] positive       ***ID***  Continue Cefuroxime for perioperative antibiotic coverage  Started emperic  coverage with Vancomycin and Cefepime   Plan to CT to rule out infection     ***Endocrine***  [x]  DM2: HbA1c 7.3%                - [x] Insulin gtt              - Need tight glycemic control to prevent wound infection.            Patient requires continuous monitoring with bedside rhythm monitoring, pulse oximetry monitoring, and continuous central venous and arterial pressure monitoring; and intermittent blood gas analysis. Care plan discussed with the ICU care team.   Patient remained critical, at risk for life threatening decompensation.    By signing my name below, I, Leela Avila, attest that this documentation has been prepared under the direction and in the presence of Kylah Chamorro NP   Electronically signed: Brian Oakes, 12-10-22 @ 19:10    I, Kylah Chamorro, personally performed the services described in this documentation. all medical record entries made by the karlaibe were at my direction and in my presence. I have reviewed the chart and agree that the record reflects my personal performance and is accurate and complete  Electronically signed: Kylah Chamorro NP

## 2022-12-10 NOTE — PROGRESS NOTE ADULT - PROBLEM SELECTOR PLAN 8
- f/u ID recs, multiple potential sources given recent lines/procedures, as well as possible biliary source  - consider removing non-vital lines  - f/u pan cultures and start abx

## 2022-12-10 NOTE — PROGRESS NOTE ADULT - ASSESSMENT
62 year old admitted with pancreatitis  not necrotizing on the CT  scan and cholecystitis .  pt has sent to the SICU but developed respiratory  failure presumed to be due to ARDS also with hs of CAD<CABG<DM and possible MI  Moved to the CTU for ECMO for cardiogenic shock vs ARDS      Fever, rising leucocytosis, SIRS/sepsis  All cultures negative to date.       PLAN:   c/w vanco   add cefepime, dose based on dialysis modality   repeat blood cx in lab   sputum cx in lab   trend cbc for leucocytosis   recommend repeat CT abd/pelvis to ensure no other potential source given recent pancreatitis.   TLC's changed today.       Plan discussed with CTU BROOKE Joseph  Please contact through MS Teams   If no response or past 5 pm/weekend call [phone].      62 year old admitted with pancreatitis  not necrotizing on the CT  scan and cholecystitis .  pt has sent to the SICU but developed respiratory  failure presumed to be due to ARDS also with hs of CAD<CABG<DM and possible MI  Moved to the CTU for ECMO for cardiogenic shock vs ARDS      Fever, rising leucocytosis, SIRS/sepsis  All cultures negative to date.       PLAN:   c/w vanco   add cefepime, dose based on dialysis modality   repeat blood cx in lab   sputum cx in lab   trend cbc for leucocytosis   recommend repeat CT abd/pelvis to ensure no other potential source given recent pancreatitis.   TLC's changed today.       Plan discussed with CTU BROOKE Joseph  Please contact through MS Teams   If no response or past 5 pm/weekend call [phone].      62 year old admitted with pancreatitis  not necrotizing on the CT  scan and cholecystitis .  pt has sent to the SICU but developed respiratory  failure presumed to be due to ARDS also with hs of CAD<CABG<DM and possible MI  Moved to the CTU for ECMO for cardiogenic shock vs ARDS      Fever, rising leucocytosis, SIRS/sepsis  All cultures negative to date.       PLAN:   c/w vanco   add cefepime, dose based on dialysis modality   repeat blood cx in lab   sputum cx in lab   trend cbc for leucocytosis   recommend repeat CT abd/pelvis to ensure no other potential source given recent pancreatitis.   TLC's changed today.       Plan discussed with CTU BROOKE Joseph  Please contact through MS Teams   If no response or past 5 pm/weekend call [phone].

## 2022-12-10 NOTE — PROGRESS NOTE ADULT - SUBJECTIVE AND OBJECTIVE BOX
62yPatient is a 62y old  Male who presents with a chief complaint of Acute cholecystitis, gallstone pancreatitis (10 Dec 2022 13:24)      Interval history:  febrile, some secretions. No diarrhea.       Allergies:   No Known Allergies      Antimicrobials:      REVIEW OF SYSTEMS:  Unable to obtain as patient intubated and sedated.        Vital Signs Last 24 Hrs  T(C): 37.5 (12-10-22 @ 12:00), Max: 38.3 (12-09-22 @ 19:00)  T(F): 99.5 (12-10-22 @ 12:00), Max: 100.9 (12-09-22 @ 19:00)  HR: 84 (12-10-22 @ 15:10) (84 - 124)  BP: --  BP(mean): --  RR: 2 (12-10-22 @ 15:00) (2 - 29)  SpO2: 91% (12-10-22 @ 15:10) (91% - 100%)      PHYSICAL EXAM:  Pt intubated, sedated   + TLC   non distended abdomen  vac at prior ECMO cannulation site.   on CVVH   + michel                            9.5    17.94 )-----------( 150      ( 10 Dec 2022 00:47 )             29.0   12-10    136  |  103  |  36<H>  ----------------------------<  113<H>  4.6   |  21<L>  |  2.19<H>    Ca    7.5<L>      10 Dec 2022 00:47  Phos  3.4     12-10  Mg     2.5     12-10    TPro  6.0  /  Alb  2.9<L>  /  TBili  0.9  /  DBili  x   /  AST  55<H>  /  ALT  15  /  AlkPhos  171<H>  12-10      LIVER FUNCTIONS - ( 10 Dec 2022 00:47 )  Alb: 2.9 g/dL / Pro: 6.0 g/dL / ALK PHOS: 171 U/L / ALT: 15 U/L / AST: 55 U/L / GGT: x               Culture - Sputum (collected 09 Dec 2022 04:58)  Source: ET Tube ET Tube  Gram Stain (09 Dec 2022 15:01):    Numerous polymorphonuclear leukocytes per low power field    Moderate Squamous epithelial cells per low power field    Few Yeast like cells    per oil power field  Preliminary Report (10 Dec 2022 10:20):    Normal Respiratory Christy present    Culture - Blood (collected 08 Dec 2022 23:45)  Source: .Blood Blood-Peripheral  Preliminary Report (10 Dec 2022 03:02):    No growth to date.    Culture - Blood (collected 08 Dec 2022 23:44)  Source: .Blood Blood-Peripheral  Preliminary Report (10 Dec 2022 03:02):    No growth to date.        Radiology:    < from: Xray Chest 1 View- PORTABLE-Routine (12.10.22 @ 03:24) >  IMPRESSION:  Trace left pleural effusion with left basilar atelectasis.  Similar pulmonary congestion.  Lines and tubes as above.

## 2022-12-10 NOTE — PROGRESS NOTE ADULT - ASSESSMENT
61 YO M with a history of CAD s/p 4v CABG ~2019 in [facility], DM2 (A1c 7.3%), and HTN who initially presented to OSH with abdominal pain and n/v and found to have pancreatitis with lipase > 3000 though also with elevated troponins. CT abdomen revealed acute pancreatitis and his initial LVEF was 40-45%. He developed MSOF with hypoxic respiratory failure requiring intubation and ERIC and went into hypoxic PEA arrest requiring ACLS and due to persistent hypoxia and hypotension on pressors after ROSC was cannulated to VA ECMO (LFV, RFA, no anterograde perfusion catheter) on 11/25. Notably CTH that day revealed small subdural hemorrhage.     His post arrest TTE on 11/26 showed a signficantly worse LVEF of 5-10% with an AV that was only minimally opening. Since then he has had improvement in his LV function (now ~35-40%) and improved pulsatility while tolerating progressive slow ECMO weans. ECMO turndown (note in chart 11/30) was reassuring for ability to decannulate to IABP/inotropes. LHC 12/06 showed closure of his 3 vein grafts with graft to LAD patent though with distal native disease. IABP placed, ECMO successful and decannulated 12/08 (transfused 2u pRBCs during). His ARDS is improving. Currently on CVVH.    Previous cardiac studies:  LHC (12/06/22) - patent LIMA-LAD, RCA , LCx , aortogram w/ closure of 3 vein grafts, LVEDP 22 mm Hg, left-to-left and left-to-right collaterals  TTE (12/03/22) - mod-to-sev segmental LVSD (inferolateral, inferior, inferoseptal hypokinesis)    Hemodynamics:  12/09 IABP 1:1, aDBP 126, aMAP 92, CVP 6, lactate 1.2  12/08 IABP 1:1, aDBP 120, aMAP 87, CVP 5  12/07 IABP 1:1 ECMO 3600 RPM 4.0 LPM, aMAP 90, aDBP 123, mVO2 66.8%, CVP 7, CO 6.8, CI 3.3 63 YO M with a history of CAD s/p 4v CABG ~2019 in [facility], DM2 (A1c 7.3%), and HTN who initially presented to OSH with abdominal pain and n/v and found to have pancreatitis with lipase > 3000 though also with elevated troponins. CT abdomen revealed acute pancreatitis and his initial LVEF was 40-45%. He developed MSOF with hypoxic respiratory failure requiring intubation and ERIC and went into hypoxic PEA arrest requiring ACLS and due to persistent hypoxia and hypotension on pressors after ROSC was cannulated to VA ECMO (LFV, RFA, no anterograde perfusion catheter) on 11/25. Notably CTH that day revealed small subdural hemorrhage.     His post arrest TTE on 11/26 showed a signficantly worse LVEF of 5-10% with an AV that was only minimally opening. Since then he has had improvement in his LV function (now ~35-40%) and improved pulsatility while tolerating progressive slow ECMO weans. ECMO turndown (note in chart 11/30) was reassuring for ability to decannulate to IABP/inotropes. LHC 12/06 showed closure of his 3 vein grafts with graft to LAD patent though with distal native disease. IABP placed, ECMO successful and decannulated 12/08 (transfused 2u pRBCs during). His ARDS is improving. Currently on CVVH.    Previous cardiac studies:  LHC (12/06/22) - patent LIMA-LAD, RCA , LCx , aortogram w/ closure of 3 vein grafts, LVEDP 22 mm Hg, left-to-left and left-to-right collaterals  TTE (12/03/22) - mod-to-sev segmental LVSD (inferolateral, inferior, inferoseptal hypokinesis)    Hemodynamics:  12/09 IABP 1:1, aDBP 126, aMAP 92, CVP 6, lactate 1.2  12/08 IABP 1:1, aDBP 120, aMAP 87, CVP 5  12/07 IABP 1:1 ECMO 3600 RPM 4.0 LPM, aMAP 90, aDBP 123, mVO2 66.8%, CVP 7, CO 6.8, CI 3.3 61 YO M with a history of CAD s/p 4v CABG ~2019 in [facility], DM2 (A1c 7.3%), and HTN who initially presented to OSH with abdominal pain and n/v and found to have pancreatitis with lipase > 3000 though also with elevated troponins. CT abdomen revealed acute pancreatitis and his initial LVEF was 40-45%. He developed MSOF with hypoxic respiratory failure requiring intubation and ERIC and went into hypoxic PEA arrest requiring ACLS and due to persistent hypoxia and hypotension on pressors after ROSC was cannulated to VA ECMO (LFV, RFA, no anterograde perfusion catheter) on 11/25. Notably CTH that day revealed small subdural hemorrhage.     His post arrest TTE on 11/26 showed a signficantly worse LVEF of 5-10% with an AV that was only minimally opening. Since then he has had improvement in his LV function (now ~35-40%) and improved pulsatility while tolerating progressive slow ECMO weans. ECMO turndown (note in chart 11/30) was reassuring for ability to decannulate to IABP/inotropes. LHC 12/06 showed closure of his 3 vein grafts with graft to LAD patent though with distal native disease. IABP placed, ECMO successful and decannulated 12/08 (transfused 2u pRBCs during). His ARDS is improving. Currently on CVVH.    Previous cardiac studies:  LHC (12/06/22) - patent LIMA-LAD, RCA , LCx , aortogram w/ closure of 3 vein grafts, LVEDP 22 mm Hg, left-to-left and left-to-right collaterals  TTE (12/03/22) - mod-to-sev segmental LVSD (inferolateral, inferior, inferoseptal hypokinesis)    Hemodynamics:  12/09 IABP 1:1, aDBP 126, aMAP 92, CVP 6, lactate 1.2  12/08 IABP 1:1, aDBP 120, aMAP 87, CVP 5  12/07 IABP 1:1 ECMO 3600 RPM 4.0 LPM, aMAP 90, aDBP 123, mVO2 66.8%, CVP 7, CO 6.8, CI 3.3 61 YO M with a history of CAD s/p 4v CABG ~2019 in [facility], DM2 (A1c 7.3%), and HTN who initially presented to OSH with abdominal pain and n/v and found to have pancreatitis with lipase > 3000 though also with elevated troponins. CT abdomen revealed acute pancreatitis and his initial LVEF was 40-45%. He developed MSOF with hypoxic respiratory failure requiring intubation and ERIC and went into hypoxic PEA arrest requiring ACLS and due to persistent hypoxia and hypotension on pressors after ROSC was cannulated to VA ECMO (LFV, RFA, no anterograde perfusion catheter) on 11/25. Notably CTH that day revealed small subdural hemorrhage.     His post arrest TTE on 11/26 showed a signficantly worse LVEF of 5-10% with an AV that was only minimally opening. Since then he has had improvement in his LV function (now ~35-40%) and improved pulsatility while tolerating progressive slow ECMO weans. ECMO turndown (note in chart 11/30) was reassuring for ability to decannulate to IABP/inotropes. LHC 12/06 showed closure of his 3 vein grafts with graft to LAD patent though with distal native disease. IABP placed, ECMO successfully decannulated 12/08 (transfused 2u pRBCs during). His ARDS is improving. Currently on CVVH.    Previous cardiac studies:  LHC (12/06/22) - patent LIMA-LAD, RCA , LCx , aortogram w/ closure of 3 vein grafts, LVEDP 22 mm Hg, left-to-left and left-to-right collaterals  TTE (12/03/22) - mod-to-sev segmental LVSD (inferolateral, inferior, inferoseptal hypokinesis)    Hemodynamics:  12/09 IABP 1:1, aDBP 126, aMAP 92, CVP 6, lactate 1.2  12/08 IABP 1:1, aDBP 120, aMAP 87, CVP 5  12/07 IABP 1:1 ECMO 3600 RPM 4.0 LPM, aMAP 90, aDBP 123, mVO2 66.8%, CVP 7, CO 6.8, CI 3.3 61 YO M with a history of CAD s/p 4v CABG ~2019 in [facility], DM2 (A1c 7.3%), and HTN who initially presented to OSH with abdominal pain and n/v and found to have pancreatitis with lipase > 3000 though also with elevated troponins. CT abdomen revealed acute pancreatitis and his initial LVEF was 40-45%. He developed MSOF with hypoxic respiratory failure requiring intubation and ERIC and went into hypoxic PEA arrest requiring ACLS and due to persistent hypoxia and hypotension on pressors after ROSC was cannulated to VA ECMO (LFV, RFA, no anterograde perfusion catheter) on 11/25. Notably CTH that day revealed small subdural hemorrhage.     His post arrest TTE on 11/26 showed a signficantly worse LVEF of 5-10% with an AV that was only minimally opening. Since then he has had improvement in his LV function (now ~35-40%) and improved pulsatility while tolerating progressive slow ECMO weans. ECMO turndown (note in chart 11/30) was reassuring for ability to decannulate to IABP/inotropes. LHC 12/06 showed closure of his 3 vein grafts with graft to LAD patent though with distal native disease. IABP placed, ECMO successfully decannulated 12/08 (transfused 2u pRBCs during). His ARDS is improving. Currently on CVVH.    Previous cardiac studies:  LHC (12/06/22) - patent LIMA-LAD, RCA , LCx , aortogram w/ closure of 3 vein grafts, LVEDP 22 mm Hg, left-to-left and left-to-right collaterals  TTE (12/03/22) - mod-to-sev segmental LVSD (inferolateral, inferior, inferoseptal hypokinesis)    Hemodynamics:  12/09 IABP 1:1, aDBP 126, aMAP 92, CVP 6, lactate 1.2  12/08 IABP 1:1, aDBP 120, aMAP 87, CVP 5  12/07 IABP 1:1 ECMO 3600 RPM 4.0 LPM, aMAP 90, aDBP 123, mVO2 66.8%, CVP 7, CO 6.8, CI 3.3

## 2022-12-10 NOTE — PROGRESS NOTE ADULT - PROBLEM SELECTOR PLAN 1
Pt with non oliguric ERIC/ ATN in the setting of STEMI, cardiac arrest & cardiogenic shock  SCr on arrival was 2.15; trended down to hector 1.6 on 11/25;  slowly trended up & increased to 3.95 11/28.     Pt received IV diuretics. SCr increased to 4.19 with BUN of 101 on 12/5/22. Pt with significant volume overload. Pt initiated on CRRT/CVVHDF to optimize volume and electrolytes on 12/5/22. BP being maintained on vasopressors. Pt likely with ATN. Pt underwent decannulation of ECMO on 12/8/22 and was taken off CRRT. Pt reinitiated on CRRT overnight on 12/9/22 for volume overload.   Plan is to continue CRRT. Tolerating well, clotted filter (increased pre filter changed to Prismasate for K: 5)  Monitor labs and urine output (465 ml/ 24 hours). Avoid any potential nephrotoxins.   Dose medications as per eGFR.

## 2022-12-10 NOTE — PROGRESS NOTE ADULT - PROBLEM SELECTOR PLAN 1
- troponin peaked at > [address]79   - [facility] 12/06 with IABP placement revealed that 3 grafts are closed w/ distal native disease from remaining LAD graft  - continue statin  - would start ASA when okay with surgical team. - troponin peaked at > 99661   - [facility] 12/06 with IABP placement revealed that 3 grafts are closed w/ distal native disease from remaining LAD graft  - continue statin  - would start ASA when okay with surgical team. - troponin peaked at > 16289   - [facility] 12/06 with IABP placement revealed that 3 grafts are closed w/ distal native disease from remaining LAD graft  - continue statin  - would start ASA when okay with surgical team.

## 2022-12-10 NOTE — PROGRESS NOTE ADULT - SUBJECTIVE AND OBJECTIVE BOX
Patient seen and examined at bedside.    Overnight Events: no events, issues or complaints    Review of Systems:  [ ] HYUN due to intubation    Current Meds:  albumin human  5% IVPB 250 milliLiter(s) IV Intermittent once  aMIOdarone Infusion 0.5 mG/Min IV Continuous <Continuous>  argatroban Infusion 0.25 MICROgram(s)/kG/Min IV Continuous <Continuous>  atorvastatin 40 milliGRAM(s) Oral at bedtime  chlorhexidine 0.12% Liquid 15 milliLiter(s) Oral Mucosa every 12 hours  chlorhexidine 2% Cloths 1 Application(s) Topical <User Schedule>  CRRT Treatment    <Continuous>  dexMEDEtomidine Infusion 1 MICROgram(s)/kG/Hr IV Continuous <Continuous>  insulin regular Infusion 3 Unit(s)/Hr IV Continuous <Continuous>  milrinone Infusion 0.2 MICROgram(s)/kG/Min IV Continuous <Continuous>  norepinephrine Infusion 0.05 MICROgram(s)/kG/Min IV Continuous <Continuous>  pantoprazole  Injectable 40 milliGRAM(s) IV Push two times a day  Phoxillum Filtration BK 4 / 2.5 5000 milliLiter(s) CRRT <Continuous>  polyethylene glycol 3350 17 Gram(s) Oral two times a day  PrismaSATE Dialysate BK 0 / 3.5 5000 milliLiter(s) CRRT <Continuous>  PrismaSOL Filtration BGK 4 / 2.5 5000 milliLiter(s) CRRT <Continuous>  senna 2 Tablet(s) Oral at bedtime  sodium chloride 0.9%. 1000 milliLiter(s) IV Continuous <Continuous>  vasopressin Infusion 0.033 Unit(s)/Min IV Continuous <Continuous>      PAST MEDICAL & SURGICAL HISTORY:      Vitals:  T(F): 99.7 (12-10), Max: 100.9 (12-09)  HR: 118 (12-10) (90 - 124)  BP: --  RR: 29 (12-10)  SpO2: 99% (12-10)  I&O's Summary    09 Dec 2022 07:01  -  10 Dec 2022 07:00  --------------------------------------------------------  IN: 3186.9 mL / OUT: 3194 mL / NET: -7.1 mL    10 Dec 2022 07:01  -  10 Dec 2022 13:25  --------------------------------------------------------  IN: 639.6 mL / OUT: 70 mL / NET: 569.6 mL        Physical Exam:  Gen: intubated  laying in hospital bed, rousable and follows commands  HEENT: NCAT, ETT in place, MMM  CV: tachycardic; systolic murmur, S1/S2  Resp: mechanical BS  Abd: soft, NT, hypoactive BS  Ext:  WWP, PPP, IABP L groin, wound vac R groin                        9.5    17.94 )-----------( 150      ( 10 Dec 2022 00:47 )             29.0     12-10    136  |  103  |  36<H>  ----------------------------<  113<H>  4.6   |  21<L>  |  2.19<H>    Ca    7.5<L>      10 Dec 2022 00:47  Phos  3.4     12-10  Mg     2.5     12-10    TPro  6.0  /  Alb  2.9<L>  /  TBili  0.9  /  DBili  x   /  AST  55<H>  /  ALT  15  /  AlkPhos  171<H>  12-10    PT/INR - ( 10 Dec 2022 10:52 )   PT: 19.0 sec;   INR: 1.64 ratio         PTT - ( 10 Dec 2022 10:52 )  PTT:43.7 sec

## 2022-12-10 NOTE — PROGRESS NOTE ADULT - SUBJECTIVE AND OBJECTIVE BOX
Patient seen and examined at the bedside.    Remained critically ill on continuous ICU monitoring.    OBJECTIVE:  Vital Signs Last 24 Hrs  T(C): 37.6 (10 Dec 2022 04:00), Max: 38.3 (09 Dec 2022 19:00)  T(F): 99.7 (10 Dec 2022 04:00), Max: 100.9 (09 Dec 2022 19:00)  HR: 114 (10 Dec 2022 07:00) (90 - 122)  BP: --  BP(mean): --  RR: 23 (10 Dec 2022 07:00) (10 - 28)  SpO2: 100% (10 Dec 2022 07:00) (98% - 100%)    Parameters below as of 10 Dec 2022 04:00  Patient On (Oxygen Delivery Method): ventilator    O2 Concentration (%): 40      Physical Exam:   General: Intubated, multiple lines gtt  Neurology: on/off sedation  Eyes: bilateral pupils equal and reactive   ENT/Neck: +ETT midline, Neck supple, trachea midline, No JVD   Respiratory: rhonchi bilaterally   CV: S1S2, no murmurs        [x] Sinus Tachycardia   Abdominal: Softly distended,+BS   Extremities: 1+ pedal edema noted, + peripheral pulses palpable, warm  Skin: No Rashes, Hematoma, Ecchymosis                        ============================I/O===========================   I&O's Detail    09 Dec 2022 07:01  -  10 Dec 2022 07:00  --------------------------------------------------------  IN:    Amiodarone: 400.8 mL    Argatroban: 4.2 mL    Argatroban: 0.9 mL    Argatroban: 4.7 mL    Argatroban: 15.4 mL    Dexmedetomidine: 325.4 mL    Glucerna: 1020 mL    Insulin: 121 mL    IV PiggyBack: 500 mL    Milrinone: 14 mL    Milrinone: 112 mL    PRBCs (Packed Red Blood Cells): 300 mL    Vasopressin: 43.5 mL    Vital1.5: 325 mL  Total IN: 3186.9 mL    OUT:    Indwelling Catheter - Urethral (mL): 465 mL    Nitroprusside: 0 mL    Other (mL): 2597 mL    sodium chloride 0.9%: 0 mL  Total OUT: 3062 mL    Total NET: 124.9 mL      10 Dec 2022 07:01  -  10 Dec 2022 08:03  --------------------------------------------------------  IN:    Amiodarone: 16.7 mL    Argatroban: 1.4 mL    Dexmedetomidine: 11.5 mL    DOBUTamine: 3.5 mL    Glucerna: 60 mL    Insulin: 5 mL    Milrinone: 5.6 mL    Vasopressin: 2.5 mL  Total IN: 106.2 mL    OUT:    Indwelling Catheter - Urethral (mL): 10 mL    sodium chloride 0.9%: 0 mL  Total OUT: 10 mL    Total NET: 96.2 mL        ============================ LABS =========================                        9.5    17.94 )-----------( 150      ( 10 Dec 2022 00:47 )             29.0     12-10    136  |  103  |  36<H>  ----------------------------<  113<H>  4.6   |  21<L>  |  2.19<H>    Ca    7.5<L>      10 Dec 2022 00:47  Phos  3.4     12-10  Mg     2.5     12-10    TPro  6.0  /  Alb  2.9<L>  /  TBili  0.9  /  DBili  x   /  AST  55<H>  /  ALT  15  /  AlkPhos  171<H>  12-10    LIVER FUNCTIONS - ( 10 Dec 2022 00:47 )  Alb: 2.9 g/dL / Pro: 6.0 g/dL / ALK PHOS: 171 U/L / ALT: 15 U/L / AST: 55 U/L / GGT: x           PT/INR - ( 08 Dec 2022 10:58 )   PT: 13.5 sec;   INR: 1.16 ratio         PTT - ( 10 Dec 2022 00:46 )  PTT:37.5 sec  ABG - ( 10 Dec 2022 06:20 )  pH, Arterial: 7.46  pH, Blood: x     /  pCO2: 30    /  pO2: 103   / HCO3: 21    / Base Excess: -2.0  /  SaO2: 98.7        ======================Micro/Rad/Cardio=================  Culture: Reviewed   CXR: Reviewed  Echo: Reviewed  ======================================================  PAST MEDICAL & SURGICAL HISTORY:      ======================================================    Assessment:  63 y/o male with PMH of CABG, DM, HTN, HLD presenting as transfer from [place] for c/f STEMI. PTA arrival received 325 aspirin, 600 plavix, and no heparin drip started. Around 1:30 am patient had multiple episodes of non-bloody diarrhea and emesis with associated abdominal pain and chest pain, unable to localize, non-radiating, with associated SOB and no associated palpitations or diaphoresis. No recent fevers/chills, cough, rashes, or changes in urination. Does report mild diffuse back pain; started 5 days ago in setting on injury while walking and lifting objects. Denies focal weakness, numbness/tingling, or LOC due does report mild dizziness.    acute rspiratory distress , cardiogenic shock s/p [facility] ECMO 11/25  Hemodynamic instability  Hypovolemia  Leukocytosis   Acute blood loss anemia  acute pancreatitis    ======================================================  Plan:   ***Neuro***  CT head showed 4mm subdural hematoma 11/26: repeat CT head unchanged 12/01  [x] Sedated with [x] Precedex for vent synchrony- on/off  Post operative neuro assessment   Soft palate laceration, ENT scoped 12/8, stable, no acute intervention at this time      ***Cardiovascular***  12/7 TTE turndown EF 30-40%, normal RV, MR mod   Invasive hemodynamic monitoring, assess perfusion indices   ST / CVP 14/ MAP 67/Hct 29.0 Lactate 1.1  [x] Primacor- 0.2 mcgs/kg/min   [x] Amiodarone- 0.5 mg/min  [x] 12/7 cath patent LIMA graft and other grafts occluded, femoral IABP placed, with good augmentation @ 1:1, plan for removal tomorrow    [x] 12/8 ECMO d/c'd  Continuos reassessment of hemodynamics   12/8 Aflutter s/p DCCV x2  [x] AC Therapy with Argatroban gtt pAF/flutter ptt 40-45  [x] Statin   11/29 HIT NG, MARY pending, f/u result      ***Pulmonary***  Post op vent management   Titration of FiO2 and PEEP, follow SpO2, CXR, blood gasses   CPAP this morning--> Held off on extubating   Plan for extubation over the weekend/ Monday     Mode: AC/ CMV (Assist Control/ Continuous Mandatory Ventilation)  RR (machine): 14  FiO2: 40  PEEP: 5  ITime: 1  MAP: 12  PC: 18  PIP: 26              ***GI***  [x] NPO with TF's Glucerna 1.5 @goal of 60cc/hr   [x] Protonix    Bowel regimen with Miralax and senna      ***Renal***  EIRC, renal following CVVHD started 12/5  CVP goal 10-12  Continue to monitor I/Os, BUN/Creatinine.   Replete lytes PRN  De Dios present      ***ID***  Continue Cefuroxime for perioperative antibiotic coverage      ***Endocrine***  [x]  DM2: HbA1c 7.3%                - [x] Insulin gtt              - Need tight glycemic control to prevent wound infection.          Patient requires continuous monitoring with:  bedside rhythm monitoring, arterial line, pulse oximetry, ventilator management and monitoring; intermittent blood gas analysis.  Care plan discussed with ICU care team.  patient remain critical; required more than usual post op care; I have spent 35 minutes providing non routine post op care, revaluated multiple times during the day .     Care Plan discussed with the ICU care team. Patient remained critical, at risk for life threatening decompensation.     By signing my name below, I, Maria D'Amico, attest that this documentation has been prepared under the direction and in the presence of Yusef Millan MD.  Electronically signed: Maria D'Amico, Scribe, 12-10-22 @ 08:03    I, Monty Holbrook, personally performed the services described in this documentation. all medical record entries made by the scribe were at my direction and in my presence. I have reviewed the chart and agree that the record reflects my personal performance and is accurate and complete  Electronically signed: Yusef Millan MD. Patient seen and examined at the bedside.    Remained critically ill on continuous ICU monitoring.    OBJECTIVE:  Vital Signs Last 24 Hrs  T(C): 37.6 (10 Dec 2022 04:00), Max: 38.3 (09 Dec 2022 19:00)  T(F): 99.7 (10 Dec 2022 04:00), Max: 100.9 (09 Dec 2022 19:00)  HR: 114 (10 Dec 2022 07:00) (90 - 122)  BP: --  BP(mean): --  RR: 23 (10 Dec 2022 07:00) (10 - 28)  SpO2: 100% (10 Dec 2022 07:00) (98% - 100%)    Parameters below as of 10 Dec 2022 04:00  Patient On (Oxygen Delivery Method): ventilator    O2 Concentration (%): 40      Physical Exam:   General: Intubated, multiple lines gtt  Neurology: on/off sedation  Eyes: bilateral pupils equal and reactive   ENT/Neck: +ETT midline, Neck supple, trachea midline, No JVD   Respiratory: rhonchi bilaterally   CV: S1S2, no murmurs        [x] Sinus Tachycardia   Abdominal: Softly distended,+BS   Extremities: 1+ pedal edema noted, + peripheral pulses palpable, warm  Skin: No Rashes, Hematoma, Ecchymosis                        ============================I/O===========================   I&O's Detail    09 Dec 2022 07:01  -  10 Dec 2022 07:00  --------------------------------------------------------  IN:    Amiodarone: 400.8 mL    Argatroban: 4.2 mL    Argatroban: 0.9 mL    Argatroban: 4.7 mL    Argatroban: 15.4 mL    Dexmedetomidine: 325.4 mL    Glucerna: 1020 mL    Insulin: 121 mL    IV PiggyBack: 500 mL    Milrinone: 14 mL    Milrinone: 112 mL    PRBCs (Packed Red Blood Cells): 300 mL    Vasopressin: 43.5 mL    Vital1.5: 325 mL  Total IN: 3186.9 mL    OUT:    Indwelling Catheter - Urethral (mL): 465 mL    Nitroprusside: 0 mL    Other (mL): 2597 mL    sodium chloride 0.9%: 0 mL  Total OUT: 3062 mL    Total NET: 124.9 mL      10 Dec 2022 07:01  -  10 Dec 2022 08:03  --------------------------------------------------------  IN:    Amiodarone: 16.7 mL    Argatroban: 1.4 mL    Dexmedetomidine: 11.5 mL    DOBUTamine: 3.5 mL    Glucerna: 60 mL    Insulin: 5 mL    Milrinone: 5.6 mL    Vasopressin: 2.5 mL  Total IN: 106.2 mL    OUT:    Indwelling Catheter - Urethral (mL): 10 mL    sodium chloride 0.9%: 0 mL  Total OUT: 10 mL    Total NET: 96.2 mL        ============================ LABS =========================                        9.5    17.94 )-----------( 150      ( 10 Dec 2022 00:47 )             29.0     12-10    136  |  103  |  36<H>  ----------------------------<  113<H>  4.6   |  21<L>  |  2.19<H>    Ca    7.5<L>      10 Dec 2022 00:47  Phos  3.4     12-10  Mg     2.5     12-10    TPro  6.0  /  Alb  2.9<L>  /  TBili  0.9  /  DBili  x   /  AST  55<H>  /  ALT  15  /  AlkPhos  171<H>  12-10    LIVER FUNCTIONS - ( 10 Dec 2022 00:47 )  Alb: 2.9 g/dL / Pro: 6.0 g/dL / ALK PHOS: 171 U/L / ALT: 15 U/L / AST: 55 U/L / GGT: x           PT/INR - ( 08 Dec 2022 10:58 )   PT: 13.5 sec;   INR: 1.16 ratio         PTT - ( 10 Dec 2022 00:46 )  PTT:37.5 sec  ABG - ( 10 Dec 2022 06:20 )  pH, Arterial: 7.46  pH, Blood: x     /  pCO2: 30    /  pO2: 103   / HCO3: 21    / Base Excess: -2.0  /  SaO2: 98.7        ======================Micro/Rad/Cardio=================  Culture: Reviewed   CXR: Reviewed  Echo: Reviewed  ======================================================  PAST MEDICAL & SURGICAL HISTORY:      ======================================================    Assessment:  63 y/o male with PMH of CABG, DM, HTN, HLD presenting as transfer from [place] for c/f STEMI. PTA arrival received 325 aspirin, 600 plavix, and no heparin drip started. Around 1:30 am patient had multiple episodes of non-bloody diarrhea and emesis with associated abdominal pain and chest pain, unable to localize, non-radiating, with associated SOB and no associated palpitations or diaphoresis. No recent fevers/chills, cough, rashes, or changes in urination. Does report mild diffuse back pain; started 5 days ago in setting on injury while walking and lifting objects. Denies focal weakness, numbness/tingling, or LOC due does report mild dizziness.    acute rspiratory distress , cardiogenic shock s/p [facility] ECMO 11/25  Hemodynamic instability  Hypovolemia  Leukocytosis   Acute blood loss anemia  acute pancreatitis    ======================================================  Plan:   ***Neuro***  CT head showed 4mm subdural hematoma 11/26: repeat CT head unchanged 12/01  [x] Sedated with [x] Precedex for vent synchrony- on/off  Post operative neuro assessment   Soft palate laceration, ENT scoped 12/8, stable, no acute intervention at this time      ***Cardiovascular***  12/7 TTE turndown EF 30-40%, normal RV, MR mod   Invasive hemodynamic monitoring, assess perfusion indices   ST / CVP 14/ MAP 67/Hct 29.0 Lactate 1.1  [x] Primacor- 0.2 mcgs/kg/min   [x] Amiodarone- 0.5 mg/min  [x] 12/7 cath patent LIMA graft and other grafts occluded, femoral IABP placed, with good augmentation @ 1:1, plan for removal tomorrow    [x] 12/8 ECMO d/c'd  Continuos reassessment of hemodynamics   12/8 Aflutter s/p DCCV x2  [x] AC Therapy with Argatroban gtt pAF/flutter ptt 40-45  [x] Statin   11/29 HIT NG, MARY pending, f/u result      ***Pulmonary***  Post op vent management   Titration of FiO2 and PEEP, follow SpO2, CXR, blood gasses   CPAP this morning--> Held off on extubating   Plan for extubation over the weekend/ Monday     Mode: AC/ CMV (Assist Control/ Continuous Mandatory Ventilation)  RR (machine): 14  FiO2: 40  PEEP: 5  ITime: 1  MAP: 12  PC: 18  PIP: 26              ***GI***  [x] NPO with TF's Glucerna 1.5 @goal of 60cc/hr   [x] Protonix    Bowel regimen with Miralax and senna      ***Renal***  ERIC, renal following CVVHD started 12/5  CVP goal 10-12  Continue to monitor I/Os, BUN/Creatinine.   Replete lytes PRN  De Dios present      ***ID***  Continue Cefuroxime for perioperative antibiotic coverage      ***Endocrine***  [x]  DM2: HbA1c 7.3%                - [x] Insulin gtt              - Need tight glycemic control to prevent wound infection.          Patient requires continuous monitoring with:  bedside rhythm monitoring, arterial line, pulse oximetry, ventilator management and monitoring; intermittent blood gas analysis.  Care plan discussed with ICU care team.  patient remain critical; required more than usual post op care; I have spent 35 minutes providing non routine post op care, revaluated multiple times during the day .     Care Plan discussed with the ICU care team. Patient remained critical, at risk for life threatening decompensation.     By signing my name below, I, Maria D'Amico, attest that this documentation has been prepared under the direction and in the presence of Yusef Millan MD.  Electronically signed: Maria D'Amico, Scribe, 12-10-22 @ 08:03    I, Monty Holbrook, personally performed the services described in this documentation. all medical record entries made by the scribe were at my direction and in my presence. I have reviewed the chart and agree that the record reflects my personal performance and is accurate and complete  Electronically signed: Yusef Millan MD. Patient seen and examined at the bedside.    Remained critically ill on continuous ICU monitoring.    OBJECTIVE:  Vital Signs Last 24 Hrs  T(C): 37.6 (10 Dec 2022 04:00), Max: 38.3 (09 Dec 2022 19:00)  T(F): 99.7 (10 Dec 2022 04:00), Max: 100.9 (09 Dec 2022 19:00)  HR: 114 (10 Dec 2022 07:00) (90 - 122)  BP: --  BP(mean): --  RR: 23 (10 Dec 2022 07:00) (10 - 28)  SpO2: 100% (10 Dec 2022 07:00) (98% - 100%)    Parameters below as of 10 Dec 2022 04:00  Patient On (Oxygen Delivery Method): ventilator    O2 Concentration (%): 40      Physical Exam:   General: Intubated, multiple lines gtt  Neurology: on/off sedation  Eyes: bilateral pupils equal and reactive   ENT/Neck: +ETT midline, Neck supple, trachea midline, No JVD   Respiratory: rhonchi bilaterally   CV: S1S2, no murmurs        [x] Sinus Tachycardia   Abdominal: Softly distended,+BS   Extremities: 1+ pedal edema noted, + peripheral pulses palpable, warm  Skin: No Rashes, Hematoma, Ecchymosis                        ============================I/O===========================   I&O's Detail    09 Dec 2022 07:01  -  10 Dec 2022 07:00  --------------------------------------------------------  IN:    Amiodarone: 400.8 mL    Argatroban: 4.2 mL    Argatroban: 0.9 mL    Argatroban: 4.7 mL    Argatroban: 15.4 mL    Dexmedetomidine: 325.4 mL    Glucerna: 1020 mL    Insulin: 121 mL    IV PiggyBack: 500 mL    Milrinone: 14 mL    Milrinone: 112 mL    PRBCs (Packed Red Blood Cells): 300 mL    Vasopressin: 43.5 mL    Vital1.5: 325 mL  Total IN: 3186.9 mL    OUT:    Indwelling Catheter - Urethral (mL): 465 mL    Nitroprusside: 0 mL    Other (mL): 2597 mL    sodium chloride 0.9%: 0 mL  Total OUT: 3062 mL    Total NET: 124.9 mL      10 Dec 2022 07:01  -  10 Dec 2022 08:03  --------------------------------------------------------  IN:    Amiodarone: 16.7 mL    Argatroban: 1.4 mL    Dexmedetomidine: 11.5 mL    DOBUTamine: 3.5 mL    Glucerna: 60 mL    Insulin: 5 mL    Milrinone: 5.6 mL    Vasopressin: 2.5 mL  Total IN: 106.2 mL    OUT:    Indwelling Catheter - Urethral (mL): 10 mL    sodium chloride 0.9%: 0 mL  Total OUT: 10 mL    Total NET: 96.2 mL        ============================ LABS =========================                        9.5    17.94 )-----------( 150      ( 10 Dec 2022 00:47 )             29.0     12-10    136  |  103  |  36<H>  ----------------------------<  113<H>  4.6   |  21<L>  |  2.19<H>    Ca    7.5<L>      10 Dec 2022 00:47  Phos  3.4     12-10  Mg     2.5     12-10    TPro  6.0  /  Alb  2.9<L>  /  TBili  0.9  /  DBili  x   /  AST  55<H>  /  ALT  15  /  AlkPhos  171<H>  12-10    LIVER FUNCTIONS - ( 10 Dec 2022 00:47 )  Alb: 2.9 g/dL / Pro: 6.0 g/dL / ALK PHOS: 171 U/L / ALT: 15 U/L / AST: 55 U/L / GGT: x           PT/INR - ( 08 Dec 2022 10:58 )   PT: 13.5 sec;   INR: 1.16 ratio         PTT - ( 10 Dec 2022 00:46 )  PTT:37.5 sec  ABG - ( 10 Dec 2022 06:20 )  pH, Arterial: 7.46  pH, Blood: x     /  pCO2: 30    /  pO2: 103   / HCO3: 21    / Base Excess: -2.0  /  SaO2: 98.7        ======================Micro/Rad/Cardio=================  Culture: Reviewed   CXR: Reviewed  Echo: Reviewed  ======================================================  PAST MEDICAL & SURGICAL HISTORY:      ======================================================    Assessment:  61 y/o male with PMH of CABG, DM, HTN, HLD presenting as transfer from [place] for c/f STEMI. PTA arrival received 325 aspirin, 600 plavix, and no heparin drip started. Around 1:30 am patient had multiple episodes of non-bloody diarrhea and emesis with associated abdominal pain and chest pain, unable to localize, non-radiating, with associated SOB and no associated palpitations or diaphoresis. No recent fevers/chills, cough, rashes, or changes in urination. Does report mild diffuse back pain; started 5 days ago in setting on injury while walking and lifting objects. Denies focal weakness, numbness/tingling, or LOC due does report mild dizziness.    acute rspiratory distress , cardiogenic shock s/p [facility] ECMO 11/25  Hemodynamic instability  Hypovolemia  Leukocytosis   Acute blood loss anemia  acute pancreatitis    ======================================================  Plan:   ***Neuro***  CT head showed 4mm subdural hematoma 11/26: repeat CT head unchanged 12/01  [x] Sedated with [x] Precedex for vent synchrony- on/off  Post operative neuro assessment   Soft palate laceration, ENT scoped 12/8, stable, no acute intervention at this time      ***Cardiovascular***  12/7 TTE turndown EF 30-40%, normal RV, MR mod   Invasive hemodynamic monitoring, assess perfusion indices   ST / CVP 14/ MAP 67/Hct 29.0 Lactate 1.1  [x] Primacor- 0.2 mcgs/kg/min   [x] Amiodarone- 0.5 mg/min  [x] 12/7 cath patent LIMA graft and other grafts occluded, femoral IABP placed, with good augmentation @ 1:1, plan for removal tomorrow    [x] 12/8 ECMO d/c'd  Continuos reassessment of hemodynamics   12/8 Aflutter s/p DCCV x2  [x] AC Therapy with Argatroban gtt pAF/flutter ptt 40-45  [x] Statin   11/29 HIT NG, MARY pending, f/u result      ***Pulmonary***  Post op vent management   Titration of FiO2 and PEEP, follow SpO2, CXR, blood gasses   CPAP this morning--> Held off on extubating   Plan for extubation over the weekend/ Monday     Mode: AC/ CMV (Assist Control/ Continuous Mandatory Ventilation)  RR (machine): 14  FiO2: 40  PEEP: 5  ITime: 1  MAP: 12  PC: 18  PIP: 26              ***GI***  [x] NPO with TF's Glucerna 1.5 @goal of 60cc/hr   [x] Protonix    Bowel regimen with Miralax and senna      ***Renal***  ERIC, renal following CVVHD started 12/5  CVP goal 10-12  Continue to monitor I/Os, BUN/Creatinine.   Replete lytes PRN  De Dios present      ***ID***  Continue Cefuroxime for perioperative antibiotic coverage      ***Endocrine***  [x]  DM2: HbA1c 7.3%                - [x] Insulin gtt              - Need tight glycemic control to prevent wound infection.          Patient requires continuous monitoring with:  bedside rhythm monitoring, arterial line, pulse oximetry, ventilator management and monitoring; intermittent blood gas analysis.  Care plan discussed with ICU care team.  patient remain critical; required more than usual post op care; I have spent 35 minutes providing non routine post op care, revaluated multiple times during the day .     Care Plan discussed with the ICU care team. Patient remained critical, at risk for life threatening decompensation.     By signing my name below, I, Maria D'Amico, attest that this documentation has been prepared under the direction and in the presence of Yusef Millan MD.  Electronically signed: Maria D'Amico, Scribe, 12-10-22 @ 08:03    I, Monty Holbrook, personally performed the services described in this documentation. all medical record entries made by the scribe were at my direction and in my presence. I have reviewed the chart and agree that the record reflects my personal performance and is accurate and complete  Electronically signed: Yusef Millan MD. Patient seen and examined at the bedside.    Remained critically ill on continuous ICU monitoring.    OBJECTIVE:  Vital Signs Last 24 Hrs  T(C): 37.6 (10 Dec 2022 04:00), Max: 38.3 (09 Dec 2022 19:00)  T(F): 99.7 (10 Dec 2022 04:00), Max: 100.9 (09 Dec 2022 19:00)  HR: 114 (10 Dec 2022 07:00) (90 - 122)  BP: --  BP(mean): --  RR: 23 (10 Dec 2022 07:00) (10 - 28)  SpO2: 100% (10 Dec 2022 07:00) (98% - 100%)    Parameters below as of 10 Dec 2022 04:00  Patient On (Oxygen Delivery Method): ventilator    O2 Concentration (%): 40      Physical Exam:   General: Intubated, multiple lines gtt  Neurology: on/off sedation  Eyes: bilateral pupils equal and reactive   ENT/Neck: +ETT midline, Neck supple, trachea midline, No JVD   Respiratory: rhonchi bilaterally   CV: S1S2, no murmurs        [x] Sinus Tachycardia   Abdominal: Softly distended,+BS   Extremities: 1+ pedal edema noted, + peripheral pulses palpable, warm  Skin: No Rashes, Hematoma, Ecchymosis                        ============================I/O===========================   I&O's Detail    09 Dec 2022 07:01  -  10 Dec 2022 07:00  --------------------------------------------------------  IN:    Amiodarone: 400.8 mL    Argatroban: 4.2 mL    Argatroban: 0.9 mL    Argatroban: 4.7 mL    Argatroban: 15.4 mL    Dexmedetomidine: 325.4 mL    Glucerna: 1020 mL    Insulin: 121 mL    IV PiggyBack: 500 mL    Milrinone: 14 mL    Milrinone: 112 mL    PRBCs (Packed Red Blood Cells): 300 mL    Vasopressin: 43.5 mL    Vital1.5: 325 mL  Total IN: 3186.9 mL    OUT:    Indwelling Catheter - Urethral (mL): 465 mL    Nitroprusside: 0 mL    Other (mL): 2597 mL    sodium chloride 0.9%: 0 mL  Total OUT: 3062 mL    Total NET: 124.9 mL      10 Dec 2022 07:01  -  10 Dec 2022 08:03  --------------------------------------------------------  IN:    Amiodarone: 16.7 mL    Argatroban: 1.4 mL    Dexmedetomidine: 11.5 mL    DOBUTamine: 3.5 mL    Glucerna: 60 mL    Insulin: 5 mL    Milrinone: 5.6 mL    Vasopressin: 2.5 mL  Total IN: 106.2 mL    OUT:    Indwelling Catheter - Urethral (mL): 10 mL    sodium chloride 0.9%: 0 mL  Total OUT: 10 mL    Total NET: 96.2 mL        ============================ LABS =========================                        9.5    17.94 )-----------( 150      ( 10 Dec 2022 00:47 )             29.0     12-10    136  |  103  |  36<H>  ----------------------------<  113<H>  4.6   |  21<L>  |  2.19<H>    Ca    7.5<L>      10 Dec 2022 00:47  Phos  3.4     12-10  Mg     2.5     12-10    TPro  6.0  /  Alb  2.9<L>  /  TBili  0.9  /  DBili  x   /  AST  55<H>  /  ALT  15  /  AlkPhos  171<H>  12-10    LIVER FUNCTIONS - ( 10 Dec 2022 00:47 )  Alb: 2.9 g/dL / Pro: 6.0 g/dL / ALK PHOS: 171 U/L / ALT: 15 U/L / AST: 55 U/L / GGT: x           PT/INR - ( 08 Dec 2022 10:58 )   PT: 13.5 sec;   INR: 1.16 ratio         PTT - ( 10 Dec 2022 00:46 )  PTT:37.5 sec  ABG - ( 10 Dec 2022 06:20 )  pH, Arterial: 7.46  pH, Blood: x     /  pCO2: 30    /  pO2: 103   / HCO3: 21    / Base Excess: -2.0  /  SaO2: 98.7        ======================Micro/Rad/Cardio=================  Culture: Reviewed   CXR: Reviewed  Echo: Reviewed  ======================================================  PAST MEDICAL & SURGICAL HISTORY:      ======================================================    Assessment:  61 y/o male with PMH of CABG, DM, HTN, HLD presenting as transfer from [place] for c/f STEMI. PTA arrival received 325 aspirin, 600 plavix, and no heparin drip started. Around 1:30 am patient had multiple episodes of non-bloody diarrhea and emesis with associated abdominal pain and chest pain, unable to localize, non-radiating, with associated SOB and no associated palpitations or diaphoresis. No recent fevers/chills, cough, rashes, or changes in urination. Does report mild diffuse back pain; started 5 days ago in setting on injury while walking and lifting objects. Denies focal weakness, numbness/tingling, or LOC due does report mild dizziness.    acute rspiratory distress , cardiogenic shock s/p [facility] ECMO 11/25  Hemodynamic instability  Hypovolemia  Leukocytosis   Acute blood loss anemia  acute pancreatitis    ======================================================  Plan:   ***Neuro***  CT head showed 4mm subdural hematoma 11/26: repeat CT head unchanged 12/01  [x] Sedated with [x] Precedex for vent synchrony- on/off  Post operative neuro assessment   Soft palate laceration, ENT scoped 12/8, stable, no acute intervention at this time      ***Cardiovascular***  12/7 TTE turndown EF 30-40%, normal RV, MR mod   12/8 GRZEGORZ EF 40-45%, MR, TR, normal RV function  Invasive hemodynamic monitoring, assess perfusion indices   ST / CVP 14/ MAP 67/Hct 29.0 Lactate 1.1  [x] Primacor- 0.2 mcgs/kg/min   [x] Amiodarone- 0.5 mg/min   Dobutamine started, then stopped due to tachycardia  [x] 12/7 cath patent LIMA graft and other grafts occluded, femoral IABP placed, with good augmentation @ 1:1, plan for removal tomorrow    [x] 12/8 ECMO d/c'd  Continuos reassessment of hemodynamics   12/8 Aflutter s/p DCCV x2  [x] AC Therapy with Argatroban gtt pAF/flutter ptt 40-45  [x] Statin   11/29 HIT NG, MARY pending, f/u result      ***Pulmonary***  Post op vent management   Titration of FiO2 and PEEP, follow SpO2, CXR, blood gasses   CPAP this morning--> Held off on extubating   Plan for extubation over the weekend/ Monday     Mode: AC/ CMV (Assist Control/ Continuous Mandatory Ventilation)  RR (machine): 14  FiO2: 40  PEEP: 5  ITime: 1  MAP: 12  PC: 18  PIP: 26              ***GI***  [x] NPO with TF's Glucerna 1.5 @goal of 60cc/hr   [x] Protonix    Bowel regimen with Miralax and senna      ***Renal***  ERIC, renal following CVVHD started 12/5  CVP goal 10-12  Continue to monitor I/Os, BUN/Creatinine.   Replete lytes PRN  De Dios present      ***ID***  Continue Cefuroxime for perioperative antibiotic coverage      ***Endocrine***  [x]  DM2: HbA1c 7.3%                - [x] Insulin gtt              - Need tight glycemic control to prevent wound infection.          Patient requires continuous monitoring with:  bedside rhythm monitoring, arterial line, pulse oximetry, ventilator management and monitoring; intermittent blood gas analysis.  Care plan discussed with ICU care team.  patient remain critical; required more than usual post op care; I have spent 35 minutes providing non routine post op care, revaluated multiple times during the day .     Care Plan discussed with the ICU care team. Patient remained critical, at risk for life threatening decompensation.     By signing my name below, I, Maria D'Amico, attest that this documentation has been prepared under the direction and in the presence of Yusef Millan MD.  Electronically signed: Maria D'Amico, Scribe, 12-10-22 @ 08:03    I, Monty Holbrook, personally performed the services described in this documentation. all medical record entries made by the scribe were at my direction and in my presence. I have reviewed the chart and agree that the record reflects my personal performance and is accurate and complete  Electronically signed: Yusef Millan MD. Patient seen and examined at the bedside.    Remained critically ill on continuous ICU monitoring.    OBJECTIVE:  Vital Signs Last 24 Hrs  T(C): 37.6 (10 Dec 2022 04:00), Max: 38.3 (09 Dec 2022 19:00)  T(F): 99.7 (10 Dec 2022 04:00), Max: 100.9 (09 Dec 2022 19:00)  HR: 114 (10 Dec 2022 07:00) (90 - 122)  BP: --  BP(mean): --  RR: 23 (10 Dec 2022 07:00) (10 - 28)  SpO2: 100% (10 Dec 2022 07:00) (98% - 100%)    Parameters below as of 10 Dec 2022 04:00  Patient On (Oxygen Delivery Method): ventilator    O2 Concentration (%): 40      Physical Exam:   General: Intubated, multiple lines gtt  Neurology: on/off sedation  Eyes: bilateral pupils equal and reactive   ENT/Neck: +ETT midline, Neck supple, trachea midline, No JVD   Respiratory: rhonchi bilaterally   CV: S1S2, no murmurs        [x] Sinus Tachycardia   Abdominal: Softly distended,+BS   Extremities: 1+ pedal edema noted, + peripheral pulses palpable, warm  Skin: No Rashes, Hematoma, Ecchymosis                        ============================I/O===========================   I&O's Detail    09 Dec 2022 07:01  -  10 Dec 2022 07:00  --------------------------------------------------------  IN:    Amiodarone: 400.8 mL    Argatroban: 4.2 mL    Argatroban: 0.9 mL    Argatroban: 4.7 mL    Argatroban: 15.4 mL    Dexmedetomidine: 325.4 mL    Glucerna: 1020 mL    Insulin: 121 mL    IV PiggyBack: 500 mL    Milrinone: 14 mL    Milrinone: 112 mL    PRBCs (Packed Red Blood Cells): 300 mL    Vasopressin: 43.5 mL    Vital1.5: 325 mL  Total IN: 3186.9 mL    OUT:    Indwelling Catheter - Urethral (mL): 465 mL    Nitroprusside: 0 mL    Other (mL): 2597 mL    sodium chloride 0.9%: 0 mL  Total OUT: 3062 mL    Total NET: 124.9 mL      10 Dec 2022 07:01  -  10 Dec 2022 08:03  --------------------------------------------------------  IN:    Amiodarone: 16.7 mL    Argatroban: 1.4 mL    Dexmedetomidine: 11.5 mL    DOBUTamine: 3.5 mL    Glucerna: 60 mL    Insulin: 5 mL    Milrinone: 5.6 mL    Vasopressin: 2.5 mL  Total IN: 106.2 mL    OUT:    Indwelling Catheter - Urethral (mL): 10 mL    sodium chloride 0.9%: 0 mL  Total OUT: 10 mL    Total NET: 96.2 mL        ============================ LABS =========================                        9.5    17.94 )-----------( 150      ( 10 Dec 2022 00:47 )             29.0     12-10    136  |  103  |  36<H>  ----------------------------<  113<H>  4.6   |  21<L>  |  2.19<H>    Ca    7.5<L>      10 Dec 2022 00:47  Phos  3.4     12-10  Mg     2.5     12-10    TPro  6.0  /  Alb  2.9<L>  /  TBili  0.9  /  DBili  x   /  AST  55<H>  /  ALT  15  /  AlkPhos  171<H>  12-10    LIVER FUNCTIONS - ( 10 Dec 2022 00:47 )  Alb: 2.9 g/dL / Pro: 6.0 g/dL / ALK PHOS: 171 U/L / ALT: 15 U/L / AST: 55 U/L / GGT: x           PT/INR - ( 08 Dec 2022 10:58 )   PT: 13.5 sec;   INR: 1.16 ratio         PTT - ( 10 Dec 2022 00:46 )  PTT:37.5 sec  ABG - ( 10 Dec 2022 06:20 )  pH, Arterial: 7.46  pH, Blood: x     /  pCO2: 30    /  pO2: 103   / HCO3: 21    / Base Excess: -2.0  /  SaO2: 98.7        ======================Micro/Rad/Cardio=================  Culture: Reviewed   CXR: Reviewed  Echo: Reviewed  ======================================================  PAST MEDICAL & SURGICAL HISTORY:      ======================================================    Assessment:  63 y/o male with PMH of CABG, DM, HTN, HLD presenting as transfer from [place] for c/f STEMI. PTA arrival received 325 aspirin, 600 plavix, and no heparin drip started. Around 1:30 am patient had multiple episodes of non-bloody diarrhea and emesis with associated abdominal pain and chest pain, unable to localize, non-radiating, with associated SOB and no associated palpitations or diaphoresis. No recent fevers/chills, cough, rashes, or changes in urination. Does report mild diffuse back pain; started 5 days ago in setting on injury while walking and lifting objects. Denies focal weakness, numbness/tingling, or LOC due does report mild dizziness.    acute rspiratory distress , cardiogenic shock s/p [facility] ECMO 11/25  Hemodynamic instability  Hypovolemia  Leukocytosis   Acute blood loss anemia  acute pancreatitis    ======================================================  Plan:   ***Neuro***  CT head showed 4mm subdural hematoma 11/26: repeat CT head unchanged 12/01  [x] Sedated with [x] Precedex for vent synchrony- on/off  Post operative neuro assessment   Soft palate laceration, ENT scoped 12/8, stable, no acute intervention at this time      ***Cardiovascular***  12/7 TTE turndown EF 30-40%, normal RV, MR mod   12/8 GRZEGORZ EF 40-45%, MR, TR, normal RV function  Invasive hemodynamic monitoring, assess perfusion indices   ST / CVP 14/ MAP 67/Hct 29.0 Lactate 1.1  [x] Primacor- 0.2 mcgs/kg/min   [x] Amiodarone- 0.5 mg/min   Dobutamine started, then stopped due to tachycardia  [x] 12/7 cath patent LIMA graft and other grafts occluded, femoral IABP placed, with good augmentation @ 1:1, plan for removal tomorrow    [x] 12/8 ECMO d/c'd  Continuos reassessment of hemodynamics   12/8 Aflutter s/p DCCV x2  [x] AC Therapy with Argatroban gtt pAF/flutter ptt 40-45  [x] Statin   11/29 HIT NG, MARY pending, f/u result      ***Pulmonary***  Post op vent management   Titration of FiO2 and PEEP, follow SpO2, CXR, blood gasses   CPAP this morning--> Held off on extubating   Plan for extubation over the weekend/ Monday     Mode: AC/ CMV (Assist Control/ Continuous Mandatory Ventilation)  RR (machine): 14  FiO2: 40  PEEP: 5  ITime: 1  MAP: 12  PC: 18  PIP: 26              ***GI***  [x] NPO with TF's Glucerna 1.5 @goal of 60cc/hr   [x] Protonix    Bowel regimen with Miralax and senna      ***Renal***  ERIC, renal following CVVHD started 12/5  CVP goal 10-12  Continue to monitor I/Os, BUN/Creatinine.   Replete lytes PRN  De Dios present      ***ID***  Continue Cefuroxime for perioperative antibiotic coverage      ***Endocrine***  [x]  DM2: HbA1c 7.3%                - [x] Insulin gtt              - Need tight glycemic control to prevent wound infection.          Patient requires continuous monitoring with:  bedside rhythm monitoring, arterial line, pulse oximetry, ventilator management and monitoring; intermittent blood gas analysis.  Care plan discussed with ICU care team.  patient remain critical; required more than usual post op care; I have spent 35 minutes providing non routine post op care, revaluated multiple times during the day .     Care Plan discussed with the ICU care team. Patient remained critical, at risk for life threatening decompensation.     By signing my name below, I, Maria D'Amico, attest that this documentation has been prepared under the direction and in the presence of Yusef Millan MD.  Electronically signed: Maria D'Amico, Scribe, 12-10-22 @ 08:03    I, Monty Holbrook, personally performed the services described in this documentation. all medical record entries made by the scribe were at my direction and in my presence. I have reviewed the chart and agree that the record reflects my personal performance and is accurate and complete  Electronically signed: Yusef Millan MD. Patient seen and examined at the bedside.    Remained critically ill on continuous ICU monitoring.    OBJECTIVE:  Vital Signs Last 24 Hrs  T(C): 37.6 (10 Dec 2022 04:00), Max: 38.3 (09 Dec 2022 19:00)  T(F): 99.7 (10 Dec 2022 04:00), Max: 100.9 (09 Dec 2022 19:00)  HR: 114 (10 Dec 2022 07:00) (90 - 122)  BP: --  BP(mean): --  RR: 23 (10 Dec 2022 07:00) (10 - 28)  SpO2: 100% (10 Dec 2022 07:00) (98% - 100%)    Parameters below as of 10 Dec 2022 04:00  Patient On (Oxygen Delivery Method): ventilator    O2 Concentration (%): 40      Physical Exam:   General: Intubated, multiple lines gtt  Neurology: on/off sedation  Eyes: bilateral pupils equal and reactive   ENT/Neck: +ETT midline, Neck supple, trachea midline, No JVD   Respiratory: rhonchi bilaterally   CV: S1S2, no murmurs        [x] Sinus Tachycardia   Abdominal: Softly distended,+BS   Extremities: 1+ pedal edema noted, + peripheral pulses palpable, warm  Skin: No Rashes, Hematoma, Ecchymosis                        ============================I/O===========================   I&O's Detail    09 Dec 2022 07:01  -  10 Dec 2022 07:00  --------------------------------------------------------  IN:    Amiodarone: 400.8 mL    Argatroban: 4.2 mL    Argatroban: 0.9 mL    Argatroban: 4.7 mL    Argatroban: 15.4 mL    Dexmedetomidine: 325.4 mL    Glucerna: 1020 mL    Insulin: 121 mL    IV PiggyBack: 500 mL    Milrinone: 14 mL    Milrinone: 112 mL    PRBCs (Packed Red Blood Cells): 300 mL    Vasopressin: 43.5 mL    Vital1.5: 325 mL  Total IN: 3186.9 mL    OUT:    Indwelling Catheter - Urethral (mL): 465 mL    Nitroprusside: 0 mL    Other (mL): 2597 mL    sodium chloride 0.9%: 0 mL  Total OUT: 3062 mL    Total NET: 124.9 mL      10 Dec 2022 07:01  -  10 Dec 2022 08:03  --------------------------------------------------------  IN:    Amiodarone: 16.7 mL    Argatroban: 1.4 mL    Dexmedetomidine: 11.5 mL    DOBUTamine: 3.5 mL    Glucerna: 60 mL    Insulin: 5 mL    Milrinone: 5.6 mL    Vasopressin: 2.5 mL  Total IN: 106.2 mL    OUT:    Indwelling Catheter - Urethral (mL): 10 mL    sodium chloride 0.9%: 0 mL  Total OUT: 10 mL    Total NET: 96.2 mL        ============================ LABS =========================                        9.5    17.94 )-----------( 150      ( 10 Dec 2022 00:47 )             29.0     12-10    136  |  103  |  36<H>  ----------------------------<  113<H>  4.6   |  21<L>  |  2.19<H>    Ca    7.5<L>      10 Dec 2022 00:47  Phos  3.4     12-10  Mg     2.5     12-10    TPro  6.0  /  Alb  2.9<L>  /  TBili  0.9  /  DBili  x   /  AST  55<H>  /  ALT  15  /  AlkPhos  171<H>  12-10    LIVER FUNCTIONS - ( 10 Dec 2022 00:47 )  Alb: 2.9 g/dL / Pro: 6.0 g/dL / ALK PHOS: 171 U/L / ALT: 15 U/L / AST: 55 U/L / GGT: x           PT/INR - ( 08 Dec 2022 10:58 )   PT: 13.5 sec;   INR: 1.16 ratio         PTT - ( 10 Dec 2022 00:46 )  PTT:37.5 sec  ABG - ( 10 Dec 2022 06:20 )  pH, Arterial: 7.46  pH, Blood: x     /  pCO2: 30    /  pO2: 103   / HCO3: 21    / Base Excess: -2.0  /  SaO2: 98.7        ======================Micro/Rad/Cardio=================  Culture: Reviewed   CXR: Reviewed  Echo: Reviewed  ======================================================  PAST MEDICAL & SURGICAL HISTORY:      ======================================================    Assessment:  61 y/o male with PMH of CABG, DM, HTN, HLD presenting as transfer from [place] for c/f STEMI. PTA arrival received 325 aspirin, 600 plavix, and no heparin drip started. Around 1:30 am patient had multiple episodes of non-bloody diarrhea and emesis with associated abdominal pain and chest pain, unable to localize, non-radiating, with associated SOB and no associated palpitations or diaphoresis. No recent fevers/chills, cough, rashes, or changes in urination. Does report mild diffuse back pain; started 5 days ago in setting on injury while walking and lifting objects. Denies focal weakness, numbness/tingling, or LOC due does report mild dizziness.    acute rspiratory distress , cardiogenic shock s/p [facility] ECMO 11/25  Hemodynamic instability  Hypovolemia  Leukocytosis   Acute blood loss anemia  acute pancreatitis    ======================================================  Plan:   ***Neuro***  CT head showed 4mm subdural hematoma 11/26: repeat CT head unchanged 12/01  [x] Sedated with [x] Precedex for vent synchrony- on/off  Post operative neuro assessment   Soft palate laceration, ENT scoped 12/8, stable, no acute intervention at this time      ***Cardiovascular***  12/7 TTE turndown EF 30-40%, normal RV, MR mod   12/8 GRZEGORZ EF 40-45%, MR, TR, normal RV function  Invasive hemodynamic monitoring, assess perfusion indices   ST / CVP 14/ MAP 67/Hct 29.0 Lactate 1.1  [x] Primacor- 0.2 mcgs/kg/min   [x] Amiodarone- 0.5 mg/min   Dobutamine started, then stopped due to tachycardia  [x] 12/7 cath patent LIMA graft and other grafts occluded, femoral IABP placed, with good augmentation @ 1:1, plan for removal tomorrow    [x] 12/8 ECMO d/c'd  Continuos reassessment of hemodynamics   12/8 Aflutter s/p DCCV x2  [x] AC Therapy with Argatroban gtt pAF/flutter ptt 40-45  [x] Statin   11/29 HIT NG, MARY pending, f/u result      ***Pulmonary***  Post op vent management   Titration of FiO2 and PEEP, follow SpO2, CXR, blood gasses   CPAP this morning--> Held off on extubating   Plan for extubation over the weekend/ Monday     Mode: AC/ CMV (Assist Control/ Continuous Mandatory Ventilation)  RR (machine): 14  FiO2: 40  PEEP: 5  ITime: 1  MAP: 12  PC: 18  PIP: 26              ***GI***  [x] NPO with TF's Glucerna 1.5 @goal of 60cc/hr   [x] Protonix    Bowel regimen with Miralax and senna      ***Renal***  ERIC, renal following CVVHD started 12/5  CVP goal 10-12  Continue to monitor I/Os, BUN/Creatinine.   Replete lytes PRN  De Dios present      ***ID***  Continue Cefuroxime for perioperative antibiotic coverage      ***Endocrine***  [x]  DM2: HbA1c 7.3%                - [x] Insulin gtt              - Need tight glycemic control to prevent wound infection.          Patient requires continuous monitoring with:  bedside rhythm monitoring, arterial line, pulse oximetry, ventilator management and monitoring; intermittent blood gas analysis.  Care plan discussed with ICU care team.  patient remain critical; required more than usual post op care; I have spent 35 minutes providing non routine post op care, revaluated multiple times during the day .     Care Plan discussed with the ICU care team. Patient remained critical, at risk for life threatening decompensation.     By signing my name below, I, Maria D'Amico, attest that this documentation has been prepared under the direction and in the presence of Yusef Millan MD.  Electronically signed: Maria D'Amico, Scribe, 12-10-22 @ 08:03    I, Monty Holbrook, personally performed the services described in this documentation. all medical record entries made by the scribe were at my direction and in my presence. I have reviewed the chart and agree that the record reflects my personal performance and is accurate and complete  Electronically signed: Yusef Millan MD. Patient seen and examined at the bedside.    Remained critically ill on continuous ICU monitoring.    OBJECTIVE:  Vital Signs Last 24 Hrs  T(C): 37.6 (10 Dec 2022 04:00), Max: 38.3 (09 Dec 2022 19:00)  T(F): 99.7 (10 Dec 2022 04:00), Max: 100.9 (09 Dec 2022 19:00)  HR: 114 (10 Dec 2022 07:00) (90 - 122)  BP: --  BP(mean): --  RR: 23 (10 Dec 2022 07:00) (10 - 28)  SpO2: 100% (10 Dec 2022 07:00) (98% - 100%)    Parameters below as of 10 Dec 2022 04:00  Patient On (Oxygen Delivery Method): ventilator    O2 Concentration (%): 40      Physical Exam:   General: Intubated, multiple lines gtt  Neurology: on/off sedation  Eyes: bilateral pupils equal and reactive   ENT/Neck: +ETT midline, Neck supple, trachea midline, No JVD   Respiratory: rhonchi bilaterally   CV: S1S2, no murmurs        [x] Sinus Tachycardia   Abdominal: Softly distended,+BS   Extremities: 1+ pedal edema noted, + peripheral pulses palpable, warm  Skin: No Rashes, Hematoma, Ecchymosis                        ============================I/O===========================   I&O's Detail    09 Dec 2022 07:01  -  10 Dec 2022 07:00  --------------------------------------------------------  IN:    Amiodarone: 400.8 mL    Argatroban: 4.2 mL    Argatroban: 0.9 mL    Argatroban: 4.7 mL    Argatroban: 15.4 mL    Dexmedetomidine: 325.4 mL    Glucerna: 1020 mL    Insulin: 121 mL    IV PiggyBack: 500 mL    Milrinone: 14 mL    Milrinone: 112 mL    PRBCs (Packed Red Blood Cells): 300 mL    Vasopressin: 43.5 mL    Vital1.5: 325 mL  Total IN: 3186.9 mL    OUT:    Indwelling Catheter - Urethral (mL): 465 mL    Nitroprusside: 0 mL    Other (mL): 2597 mL    sodium chloride 0.9%: 0 mL  Total OUT: 3062 mL    Total NET: 124.9 mL      10 Dec 2022 07:01  -  10 Dec 2022 08:03  -------------------------------------------------  IN:    Amiodarone: 16.7 mL    Argatroban: 1.4 mL    Dexmedetomidine: 11.5 mL    DOBUTamine: 3.5 mL    Glucerna: 60 mL    Insulin: 5 mL    Milrinone: 5.6 mL    Vasopressin: 2.5 mL  Total IN: 106.2 mL    OUT:    Indwelling Catheter - Urethral (mL): 10 mL    sodium chloride 0.9%: 0 mL  Total OUT: 10 mL    Total NET: 96.2 mL        ============================ LABS =========================                        9.5    17.94 )-----------( 150      ( 10 Dec 2022 00:47 )             29.0     12-10    136  |  103  |  36<H>  ----------------------------<  113<H>  4.6   |  21<L>  |  2.19<H>    Ca    7.5<L>      10 Dec 2022 00:47  Phos  3.4     12-10  Mg     2.5     12-10    TPro  6.0  /  Alb  2.9<L>  /  TBili  0.9  /  DBili  x   /  AST  55<H>  /  ALT  15  /  AlkPhos  171<H>  12-10    LIVER FUNCTIONS - ( 10 Dec 2022 00:47 )  Alb: 2.9 g/dL / Pro: 6.0 g/dL / ALK PHOS: 171 U/L / ALT: 15 U/L / AST: 55 U/L / GGT: x           PT/INR - ( 08 Dec 2022 10:58 )   PT: 13.5 sec;   INR: 1.16 ratio         PTT - ( 10 Dec 2022 00:46 )  PTT:37.5 sec  ABG - ( 10 Dec 2022 06:20 )  pH, Arterial: 7.46  pH, Blood: x     /  pCO2: 30    /  pO2: 103   / HCO3: 21    / Base Excess: -2.0  /  SaO2: 98.7        ======================Micro/Rad/Cardio=================  Culture: Reviewed   CXR: Reviewed  Echo: Reviewed  ======================================================  PAST MEDICAL & SURGICAL HISTORY:      ======================================================    Assessment:  63 y/o male with PMH of CABG, DM, HTN, HLD presenting as transfer from [place] for c/f STEMI. PTA arrival received 325 aspirin, 600 plavix, and no heparin drip started. Around 1:30 am patient had multiple episodes of non-bloody diarrhea and emesis with associated abdominal pain and chest pain, unable to localize, non-radiating, with associated SOB and no associated palpitations or diaphoresis. No recent fevers/chills, cough, rashes, or changes in urination. Does report mild diffuse back pain; started 5 days ago in setting on injury while walking and lifting objects. Denies focal weakness, numbness/tingling, or LOC due does report mild dizziness.    acute rspiratory distress/failure, intubated ,   cardiogenic shock s/p [facility] ECMO 11/25-->decanulated 12/8  CAD/ASHD/CABG, acute MI  cardiogenic shock  Hemodynamic instability  acute renal failure  encounter management IABP  encounter weaning ventilator  Leukocytosis   Anemia  acute pancreatitis    ======================================================  Plan:   ***Neuro***  CT head showed 4mm subdural hematoma 11/26: repeat CT head unchanged 12/01  [x] Sedated with [x] Precedex for vent synchrony- on/off  Post operative neuro assessment   Soft palate laceration, ENT scoped 12/8, stable, no acute intervention at this time      ***Cardiovascular***  12/7 TTE turndown EF 30-40%, normal RV, MR mod   12/8 GRZEGORZ EF 40-45%, MR, TR, normal RV function  Invasive hemodynamic monitoring, assess perfusion indices   ST / CVP 14/ MAP 67/Hct 29.0 Lactate 1.1  [x] Primacor- 0.2 mcgs/kg/min   [x] Amiodarone- 0.5 mg/min   Dobutamine started, then stopped due to tachycardia  [x] 12/7 cath patent LIMA graft and other grafts occluded, femoral IABP placed, with good augmentation @ 1:1, plan for removal tomorrow    [x] 12/8 ECMO d/c'd  Continuos reassessment of hemodynamics   12/8 Aflutter s/p DCCV x2  [x] AC Therapy with Argatroban gtt pAF/flutter ptt 40-45  [x] Statin   11/29 HIT NG, MARY pending, f/u result      ***Pulmonary***  Post op vent management   Titration of FiO2 and PEEP, follow SpO2, CXR, blood gasses   CPAP this morning--> Held off on extubating   Plan for extubation over the weekend/ Monday     Mode: AC/ CMV (Assist Control/ Continuous Mandatory Ventilation)  RR (machine): 14  FiO2: 40  PEEP: 5  ITime: 1  MAP: 12  PC: 18  PIP: 26              ***GI***  [x] NPO with TF's Glucerna 1.5 @goal of 60cc/hr   [x] Protonix    Bowel regimen with Miralax and senna      ***Renal***  ERIC, renal following CVVHD started 12/5  CVP goal 10-12  Continue to monitor I/Os, BUN/Creatinine.   Replete lytes PRN  De Dios present      ***ID***  Continue Cefuroxime for perioperative antibiotic coverage      ***Endocrine***  [x]  DM2: HbA1c 7.3%                - [x] Insulin gtt              - Need tight glycemic control to prevent wound infection.          Patient requires continuous monitoring with:  bedside rhythm monitoring, arterial line, pulse oximetry, ventilator management and monitoring; intermittent blood gas analysis.  Care plan discussed with ICU care team.  patient remain critical; required more than usual post op care; I have spent 35 minutes providing non routine post op care, revaluated multiple times during the day .     Care Plan discussed with the ICU care team. Patient remained critical, at risk for life threatening decompensation.     By signing my name below, I, Maria D'Amico, attest that this documentation has been prepared under the direction and in the presence of Yusef Millan MD.  Electronically signed: Maria D'Amico, Scribe, 12-10-22 @ 08:03    IMonty, personally performed the services described in this documentation. all medical record entries made by the scribe were at my direction and in my presence. I have reviewed the chart and agree that the record reflects my personal performance and is accurate and complete  Electronically signed: Yusef Millan MD. Patient seen and examined at the bedside.    Remained critically ill on continuous ICU monitoring.    OBJECTIVE:  Vital Signs Last 24 Hrs  T(C): 37.6 (10 Dec 2022 04:00), Max: 38.3 (09 Dec 2022 19:00)  T(F): 99.7 (10 Dec 2022 04:00), Max: 100.9 (09 Dec 2022 19:00)  HR: 114 (10 Dec 2022 07:00) (90 - 122)  BP: --  BP(mean): --  RR: 23 (10 Dec 2022 07:00) (10 - 28)  SpO2: 100% (10 Dec 2022 07:00) (98% - 100%)    Parameters below as of 10 Dec 2022 04:00  Patient On (Oxygen Delivery Method): ventilator    O2 Concentration (%): 40      Physical Exam:   General: Intubated, multiple lines gtt  Neurology: on/off sedation  Eyes: bilateral pupils equal and reactive   ENT/Neck: +ETT midline, Neck supple, trachea midline, No JVD   Respiratory: rhonchi bilaterally   CV: S1S2, no murmurs        [x] Sinus Tachycardia   Abdominal: Softly distended,+BS   Extremities: 1+ pedal edema noted, + peripheral pulses palpable, warm  Skin: No Rashes, Hematoma, Ecchymosis                        ============================I/O===========================   I&O's Detail    09 Dec 2022 07:01  -  10 Dec 2022 07:00  --------------------------------------------------------  IN:    Amiodarone: 400.8 mL    Argatroban: 4.2 mL    Argatroban: 0.9 mL    Argatroban: 4.7 mL    Argatroban: 15.4 mL    Dexmedetomidine: 325.4 mL    Glucerna: 1020 mL    Insulin: 121 mL    IV PiggyBack: 500 mL    Milrinone: 14 mL    Milrinone: 112 mL    PRBCs (Packed Red Blood Cells): 300 mL    Vasopressin: 43.5 mL    Vital1.5: 325 mL  Total IN: 3186.9 mL    OUT:    Indwelling Catheter - Urethral (mL): 465 mL    Nitroprusside: 0 mL    Other (mL): 2597 mL    sodium chloride 0.9%: 0 mL  Total OUT: 3062 mL    Total NET: 124.9 mL      10 Dec 2022 07:01  -  10 Dec 2022 08:03  -------------------------------------------------  IN:    Amiodarone: 16.7 mL    Argatroban: 1.4 mL    Dexmedetomidine: 11.5 mL    DOBUTamine: 3.5 mL    Glucerna: 60 mL    Insulin: 5 mL    Milrinone: 5.6 mL    Vasopressin: 2.5 mL  Total IN: 106.2 mL    OUT:    Indwelling Catheter - Urethral (mL): 10 mL    sodium chloride 0.9%: 0 mL  Total OUT: 10 mL    Total NET: 96.2 mL        ============================ LABS =========================                        9.5    17.94 )-----------( 150      ( 10 Dec 2022 00:47 )             29.0     12-10    136  |  103  |  36<H>  ----------------------------<  113<H>  4.6   |  21<L>  |  2.19<H>    Ca    7.5<L>      10 Dec 2022 00:47  Phos  3.4     12-10  Mg     2.5     12-10    TPro  6.0  /  Alb  2.9<L>  /  TBili  0.9  /  DBili  x   /  AST  55<H>  /  ALT  15  /  AlkPhos  171<H>  12-10    LIVER FUNCTIONS - ( 10 Dec 2022 00:47 )  Alb: 2.9 g/dL / Pro: 6.0 g/dL / ALK PHOS: 171 U/L / ALT: 15 U/L / AST: 55 U/L / GGT: x           PT/INR - ( 08 Dec 2022 10:58 )   PT: 13.5 sec;   INR: 1.16 ratio         PTT - ( 10 Dec 2022 00:46 )  PTT:37.5 sec  ABG - ( 10 Dec 2022 06:20 )  pH, Arterial: 7.46  pH, Blood: x     /  pCO2: 30    /  pO2: 103   / HCO3: 21    / Base Excess: -2.0  /  SaO2: 98.7        ======================Micro/Rad/Cardio=================  Culture: Reviewed   CXR: Reviewed  Echo: Reviewed  ======================================================  PAST MEDICAL & SURGICAL HISTORY:      ======================================================    Assessment:  63 y/o male with PMH of CABG, DM, HTN, HLD presenting as transfer from [place] for c/f STEMI. PTA arrival received 325 aspirin, 600 plavix, and no heparin drip started. Around 1:30 am patient had multiple episodes of non-bloody diarrhea and emesis with associated abdominal pain and chest pain, unable to localize, non-radiating, with associated SOB and no associated palpitations or diaphoresis. No recent fevers/chills, cough, rashes, or changes in urination. Does report mild diffuse back pain; started 5 days ago in setting on injury while walking and lifting objects. Denies focal weakness, numbness/tingling, or LOC due does report mild dizziness.    acute rspiratory distress/failure, intubated ,   cardiogenic shock s/p [facility] ECMO 11/25-->decanulated 12/8  CAD/ASHD/CABG, acute MI  cardiogenic shock  Hemodynamic instability  acute renal failure  encounter management IABP  encounter weaning ventilator  Leukocytosis   Anemia  acute pancreatitis    ======================================================  Plan:   ***Neuro***  CT head showed 4mm subdural hematoma 11/26: repeat CT head unchanged 12/01  [x] Sedated with [x] Precedex for vent synchrony- on/off  Post operative neuro assessment   Soft palate laceration, ENT scoped 12/8, stable, no acute intervention at this time      ***Cardiovascular***  12/7 TTE turndown EF 30-40%, normal RV, MR mod   12/8 GRZEGORZ EF 40-45%, MR, TR, normal RV function  Invasive hemodynamic monitoring, assess perfusion indices   ST / CVP 14/ MAP 67/Hct 29.0 Lactate 1.1  [x] Primacor- 0.2 mcgs/kg/min   [x] Amiodarone- 0.5 mg/min   Dobutamine started, then stopped due to tachycardia  [x] 12/7 cath patent LIMA graft and other grafts occluded, femoral IABP placed, with good augmentation @ 1:1, plan for removal tomorrow    [x] 12/8 ECMO d/c'd  Continuos reassessment of hemodynamics   12/8 Aflutter s/p DCCV x2  [x] AC Therapy with Argatroban gtt pAF/flutter ptt 40-45  [x] Statin   11/29 HIT NG, MARY pending, f/u result      ***Pulmonary***  Post op vent management   Titration of FiO2 and PEEP, follow SpO2, CXR, blood gasses   CPAP this morning--> Held off on extubating   Plan for extubation over the weekend/ Monday     Mode: AC/ CMV (Assist Control/ Continuous Mandatory Ventilation)  RR (machine): 14  FiO2: 40  PEEP: 5  ITime: 1  MAP: 12  PC: 18  PIP: 26              ***GI***  [x] NPO with TF's Glucerna 1.5 @goal of 60cc/hr   [x] Protonix    Bowel regimen with Miralax and senna      ***Renal***  ERIC, renal following CVVHD started 12/5  CVP goal 10-12  Continue to monitor I/Os, BUN/Creatinine.   Replete lytes PRN  De Dios present      ***ID***  Continue Cefuroxime for perioperative antibiotic coverage      ***Endocrine***  [x]  DM2: HbA1c 7.3%                - [x] Insulin gtt              - Need tight glycemic control to prevent wound infection.          Patient requires continuous monitoring with:  bedside rhythm monitoring, arterial line, pulse oximetry, ventilator management and monitoring; intermittent blood gas analysis.  Care plan discussed with ICU care team.  patient remain critical; required more than usual post op care; I have spent 35 minutes providing non routine post op care, revaluated multiple times during the day .     Care Plan discussed with the ICU care team. Patient remained critical, at risk for life threatening decompensation.     By signing my name below, I, Maria D'Amico, attest that this documentation has been prepared under the direction and in the presence of Yusef Millan MD.  Electronically signed: Maria D'Amico, Scribe, 12-10-22 @ 08:03    IMonty, personally performed the services described in this documentation. all medical record entries made by the scribe were at my direction and in my presence. I have reviewed the chart and agree that the record reflects my personal performance and is accurate and complete  Electronically signed: Yusef Millan MD. Patient seen and examined at the bedside.    Remained critically ill on continuous ICU monitoring.    OBJECTIVE:  Vital Signs Last 24 Hrs  T(C): 37.6 (10 Dec 2022 04:00), Max: 38.3 (09 Dec 2022 19:00)  T(F): 99.7 (10 Dec 2022 04:00), Max: 100.9 (09 Dec 2022 19:00)  HR: 114 (10 Dec 2022 07:00) (90 - 122)  BP: --  BP(mean): --  RR: 23 (10 Dec 2022 07:00) (10 - 28)  SpO2: 100% (10 Dec 2022 07:00) (98% - 100%)    Parameters below as of 10 Dec 2022 04:00  Patient On (Oxygen Delivery Method): ventilator    O2 Concentration (%): 40      Physical Exam:   General: Intubated, multiple lines gtt  Neurology: on/off sedation  Eyes: bilateral pupils equal and reactive   ENT/Neck: +ETT midline, Neck supple, trachea midline, No JVD   Respiratory: rhonchi bilaterally   CV: S1S2, no murmurs        [x] Sinus Tachycardia   Abdominal: Softly distended,+BS   Extremities: 1+ pedal edema noted, + peripheral pulses palpable, warm  Skin: No Rashes, Hematoma, Ecchymosis                        ============================I/O===========================   I&O's Detail    09 Dec 2022 07:01  -  10 Dec 2022 07:00  --------------------------------------------------------  IN:    Amiodarone: 400.8 mL    Argatroban: 4.2 mL    Argatroban: 0.9 mL    Argatroban: 4.7 mL    Argatroban: 15.4 mL    Dexmedetomidine: 325.4 mL    Glucerna: 1020 mL    Insulin: 121 mL    IV PiggyBack: 500 mL    Milrinone: 14 mL    Milrinone: 112 mL    PRBCs (Packed Red Blood Cells): 300 mL    Vasopressin: 43.5 mL    Vital1.5: 325 mL  Total IN: 3186.9 mL    OUT:    Indwelling Catheter - Urethral (mL): 465 mL    Nitroprusside: 0 mL    Other (mL): 2597 mL    sodium chloride 0.9%: 0 mL  Total OUT: 3062 mL    Total NET: 124.9 mL      10 Dec 2022 07:01  -  10 Dec 2022 08:03  -------------------------------------------------  IN:    Amiodarone: 16.7 mL    Argatroban: 1.4 mL    Dexmedetomidine: 11.5 mL    DOBUTamine: 3.5 mL    Glucerna: 60 mL    Insulin: 5 mL    Milrinone: 5.6 mL    Vasopressin: 2.5 mL  Total IN: 106.2 mL    OUT:    Indwelling Catheter - Urethral (mL): 10 mL    sodium chloride 0.9%: 0 mL  Total OUT: 10 mL    Total NET: 96.2 mL        ============================ LABS =========================                        9.5    17.94 )-----------( 150      ( 10 Dec 2022 00:47 )             29.0     12-10    136  |  103  |  36<H>  ----------------------------<  113<H>  4.6   |  21<L>  |  2.19<H>    Ca    7.5<L>      10 Dec 2022 00:47  Phos  3.4     12-10  Mg     2.5     12-10    TPro  6.0  /  Alb  2.9<L>  /  TBili  0.9  /  DBili  x   /  AST  55<H>  /  ALT  15  /  AlkPhos  171<H>  12-10    LIVER FUNCTIONS - ( 10 Dec 2022 00:47 )  Alb: 2.9 g/dL / Pro: 6.0 g/dL / ALK PHOS: 171 U/L / ALT: 15 U/L / AST: 55 U/L / GGT: x           PT/INR - ( 08 Dec 2022 10:58 )   PT: 13.5 sec;   INR: 1.16 ratio         PTT - ( 10 Dec 2022 00:46 )  PTT:37.5 sec  ABG - ( 10 Dec 2022 06:20 )  pH, Arterial: 7.46  pH, Blood: x     /  pCO2: 30    /  pO2: 103   / HCO3: 21    / Base Excess: -2.0  /  SaO2: 98.7        ======================Micro/Rad/Cardio=================  Culture: Reviewed   CXR: Reviewed  Echo: Reviewed  ======================================================  PAST MEDICAL & SURGICAL HISTORY:      ======================================================    Assessment:  61 y/o male with PMH of CABG, DM, HTN, HLD presenting as transfer from [place] for c/f STEMI. PTA arrival received 325 aspirin, 600 plavix, and no heparin drip started. Around 1:30 am patient had multiple episodes of non-bloody diarrhea and emesis with associated abdominal pain and chest pain, unable to localize, non-radiating, with associated SOB and no associated palpitations or diaphoresis. No recent fevers/chills, cough, rashes, or changes in urination. Does report mild diffuse back pain; started 5 days ago in setting on injury while walking and lifting objects. Denies focal weakness, numbness/tingling, or LOC due does report mild dizziness.    acute rspiratory distress/failure, intubated ,   cardiogenic shock s/p [facility] ECMO 11/25-->decanulated 12/8  CAD/ASHD/CABG, acute MI  cardiogenic shock  Hemodynamic instability  acute renal failure  encounter management IABP  encounter weaning ventilator  Leukocytosis   Anemia  acute pancreatitis    ======================================================  Plan:   ***Neuro***  CT head showed 4mm subdural hematoma 11/26: repeat CT head unchanged 12/01  [x] Sedated with [x] Precedex for vent synchrony- on/off  Post operative neuro assessment   Soft palate laceration, ENT scoped 12/8, stable, no acute intervention at this time      ***Cardiovascular***  12/7 TTE turndown EF 30-40%, normal RV, MR mod   12/8 GRZEGORZ EF 40-45%, MR, TR, normal RV function  Invasive hemodynamic monitoring, assess perfusion indices   ST / CVP 14/ MAP 67/Hct 29.0 Lactate 1.1  [x] Primacor- 0.2 mcgs/kg/min   [x] Amiodarone- 0.5 mg/min   Dobutamine started, then stopped due to tachycardia  [x] 12/7 cath patent LIMA graft and other grafts occluded, femoral IABP placed, with good augmentation @ 1:1, plan for removal tomorrow    [x] 12/8 ECMO d/c'd  Continuos reassessment of hemodynamics   12/8 Aflutter s/p DCCV x2  [x] AC Therapy with Argatroban gtt pAF/flutter ptt 40-45  [x] Statin   11/29 HIT NG, MARY pending, f/u result      ***Pulmonary***  Post op vent management   Titration of FiO2 and PEEP, follow SpO2, CXR, blood gasses   CPAP this morning--> Held off on extubating   Plan for extubation over the weekend/ Monday     Mode: AC/ CMV (Assist Control/ Continuous Mandatory Ventilation)  RR (machine): 14  FiO2: 40  PEEP: 5  ITime: 1  MAP: 12  PC: 18  PIP: 26              ***GI***  [x] NPO with TF's Glucerna 1.5 @goal of 60cc/hr   [x] Protonix    Bowel regimen with Miralax and senna      ***Renal***  ERIC, renal following CVVHD started 12/5  CVP goal 10-12  Continue to monitor I/Os, BUN/Creatinine.   Replete lytes PRN  De Dios present      ***ID***  Continue Cefuroxime for perioperative antibiotic coverage      ***Endocrine***  [x]  DM2: HbA1c 7.3%                - [x] Insulin gtt              - Need tight glycemic control to prevent wound infection.          Patient requires continuous monitoring with:  bedside rhythm monitoring, arterial line, pulse oximetry, ventilator management and monitoring; intermittent blood gas analysis.  Care plan discussed with ICU care team.  patient remain critical; required more than usual post op care; I have spent 35 minutes providing non routine post op care, revaluated multiple times during the day .     Care Plan discussed with the ICU care team. Patient remained critical, at risk for life threatening decompensation.     By signing my name below, I, Maria D'Amico, attest that this documentation has been prepared under the direction and in the presence of Yusef Millan MD.  Electronically signed: Maria D'Amico, Scribe, 12-10-22 @ 08:03    IMonty, personally performed the services described in this documentation. all medical record entries made by the scribe were at my direction and in my presence. I have reviewed the chart and agree that the record reflects my personal performance and is accurate and complete  Electronically signed: Yusef Millan MD.

## 2022-12-10 NOTE — PROGRESS NOTE ADULT - ATTENDING COMMENTS
Had drop in sat and was stated on dobutamine leading to aflutter so started on amiodarone. Notably tachycardic and hypotensive with increasing pressors. On exam, NAD, JVP approx 10-12 cm w/ HJR, RRR, CTA anterior lung fields, abdomen soft with bowel sounds, 1+ edema in UE/LE, pulses intact. Labs reviewed - WBC 17.8 (from 22), Na 136 (improved), BUN/Cr 34/1.89 (on HD), albumin 3.0, amylase/lipase stably elevated (improved from prior). LHC 12/6 revealed patent [id number] to LAD,  of RCA/LCx with L to L and L to R collaterals; aortogram revealed no filling of any other grafts.   - would consider keeping net negative  - c/w IABP and will attempt wean with inotropic support if necessary  - c/w anticoagulation  - on statin  - notably febrile; would change lines and start empiric abx   - prognosis guarded; family meeting held 12/6 and discussed at length current state and next steps with plan to f/u next week

## 2022-12-10 NOTE — PROGRESS NOTE ADULT - SUBJECTIVE AND OBJECTIVE BOX
[facility] DIVISION OF KIDNEY DISEASES AND HYPERTENSION -- FOLLOW UP NOTE  --------------------------------------------------------------------------------  Chief Complaint:    24 hour events/subjective:    Patient tolerating CRRT, Clotted this morning, Patient potassium uptrend   CXR performed demonstrating Left pleural effusion.    Patient awake and following commands      PAST HISTORY  --------------------------------------------------------------------------------  No significant changes to PMH, PSH, FHx, SHx, unless otherwise noted    ALLERGIES & MEDICATIONS  --------------------------------------------------------------------------------  Allergies    No Known Allergies    Intolerances      Standing Inpatient Medications  albumin human  5% IVPB 250 milliLiter(s) IV Intermittent once  aMIOdarone Infusion 0.5 mG/Min IV Continuous <Continuous>  argatroban Infusion 0.25 MICROgram(s)/kG/Min IV Continuous <Continuous>  atorvastatin 40 milliGRAM(s) Oral at bedtime  chlorhexidine 0.12% Liquid 15 milliLiter(s) Oral Mucosa every 12 hours  chlorhexidine 2% Cloths 1 Application(s) Topical <User Schedule>  CRRT Treatment    <Continuous>  dexMEDEtomidine Infusion 1 MICROgram(s)/kG/Hr IV Continuous <Continuous>  insulin regular Infusion 3 Unit(s)/Hr IV Continuous <Continuous>  milrinone Infusion 0.2 MICROgram(s)/kG/Min IV Continuous <Continuous>  norepinephrine Infusion 0.05 MICROgram(s)/kG/Min IV Continuous <Continuous>  pantoprazole  Injectable 40 milliGRAM(s) IV Push two times a day  Phoxillum Filtration BK 4 / 2.5 5000 milliLiter(s) CRRT <Continuous>  polyethylene glycol 3350 17 Gram(s) Oral two times a day  PrismaSATE Dialysate BK 0 / 3.5 5000 milliLiter(s) CRRT <Continuous>  PrismaSOL Filtration BGK 4 / 2.5 5000 milliLiter(s) CRRT <Continuous>  senna 2 Tablet(s) Oral at bedtime  sodium chloride 0.9%. 1000 milliLiter(s) IV Continuous <Continuous>  vasopressin Infusion 0.033 Unit(s)/Min IV Continuous <Continuous>    PRN Inpatient Medications      REVIEW OF SYSTEMS  --------------------------------------------------------------------------------      All other systems were reviewed and are negative, except as noted.    VITALS/PHYSICAL EXAM  --------------------------------------------------------------------------------  T(C): 37.6 (12-10-22 @ 04:00), Max: 38.3 (12-09-22 @ 19:00)  HR: 118 (12-10-22 @ 10:03) (90 - 124)  BP: --  RR: 29 (12-10-22 @ 09:30) (9 - 29)  SpO2: 99% (12-10-22 @ 10:03) (98% - 100%)  Wt(kg): --        12-09-22 @ 07:01  -  12-10-22 @ 07:00  --------------------------------------------------------  IN: 3186.9 mL / OUT: 3194 mL / NET: -7.1 mL    12-10-22 @ 07:01  -  12-10-22 @ 13:05  --------------------------------------------------------  IN: 639.6 mL / OUT: 70 mL / NET: 569.6 mL        Physical Exam:  	Gen: NAD  	HEENT: Orally intubated   	Pulm: mechanically ventilated   	CV: Tachycardic   	Abd: Soft, +BS   	Ext: No LE edema B/L  	Neuro: Awake  	Skin: Warm and dry  	Vascular access: Right Non tunneled HD catheter      LABS/STUDIES  --------------------------------------------------------------------------------              9.5    17.94 >-----------<  150      [12-10-22 @ 00:47]              29.0     136  |  103  |  36  ----------------------------<  113      [12-10-22 @ 00:47]  4.6   |  21  |  2.19        Ca     7.5     [12-10-22 @ 00:47]      Mg     2.5     [12-10-22 @ 00:47]      Phos  3.4     [12-10-22 @ 00:47]    TPro  6.0  /  Alb  2.9  /  TBili  0.9  /  DBili  x   /  AST  55  /  ALT  15  /  AlkPhos  171  [12-10-22 @ 00:47]    PT/INR: PT 19.0 , INR 1.64       [12-10-22 @ 10:52]  PTT: 43.7       [12-10-22 @ 10:52]      Creatinine Trend:  SCr 2.19 [12-10 @ 00:47]  SCr 2.29 [12-09 @ 12:23]  SCr 3.10 [12-09 @ 00:19]  SCr 1.89 [12-08 @ 10:58]  SCr 1.66 [12-08 @ 00:36]    Urinalysis - [12-03-22 @ 14:45]      Color BROWN / Appearance Slightly Turbid / SG 1.015 / pH 5.5      Gluc Negative / Ketone Negative  / Bili Negative / Urobili 6 mg/dL       Blood Moderate / Protein 30 mg/dL / Leuk Est Negative / Nitrite Negative      RBC 43 / WBC 2 / Hyaline 2 / Gran  / Sq Epi  / Non Sq Epi 1 / Bacteria Negative      TSH 0.60      [11-26-22 @ 05:24]  Lipid: chol --, , HDL --, LDL --      [12-05-22 @ 00:50]       [facility] DIVISION OF KIDNEY DISEASES AND HYPERTENSION -- FOLLOW UP NOTE  --------------------------------------------------------------------------------  Chief Complaint:    24 hour events/subjective:    Patient tolerating CRRT, Clotted this morning, Patient potassium uptrend   CXR performed demonstrating Left pleural effusion.    Patient awake and following commands      PAST HISTORY  --------------------------------------------------------------------------------  No significant changes to PMH, PSH, FHx, SHx, unless otherwise noted    ALLERGIES & MEDICATIONS  --------------------------------------------------------------------------------  Allergies    No Known Allergies    Intolerances      Standing Inpatient Medications  albumin human  5% IVPB 250 milliLiter(s) IV Intermittent once  aMIOdarone Infusion 0.5 mG/Min IV Continuous <Continuous>  argatroban Infusion 0.25 MICROgram(s)/kG/Min IV Continuous <Continuous>  atorvastatin 40 milliGRAM(s) Oral at bedtime  chlorhexidine 0.12% Liquid 15 milliLiter(s) Oral Mucosa every 12 hours  chlorhexidine 2% Cloths 1 Application(s) Topical <User Schedule>  CRRT Treatment    <Continuous>  dexMEDEtomidine Infusion 1 MICROgram(s)/kG/Hr IV Continuous <Continuous>  insulin regular Infusion 3 Unit(s)/Hr IV Continuous <Continuous>  milrinone Infusion 0.2 MICROgram(s)/kG/Min IV Continuous <Continuous>  norepinephrine Infusion 0.05 MICROgram(s)/kG/Min IV Continuous <Continuous>  pantoprazole  Injectable 40 milliGRAM(s) IV Push two times a day  Phoxillum Filtration BK 4 / 2.5 5000 milliLiter(s) CRRT <Continuous>  polyethylene glycol 3350 17 Gram(s) Oral two times a day  PrismaSATE Dialysate BK 0 / 3.5 5000 milliLiter(s) CRRT <Continuous>  PrismaSOL Filtration BGK 4 / 2.5 5000 milliLiter(s) CRRT <Continuous>  senna 2 Tablet(s) Oral at bedtime  sodium chloride 0.9%. 1000 milliLiter(s) IV Continuous <Continuous>  vasopressin Infusion 0.033 Unit(s)/Min IV Continuous <Continuous>    PRN Inpatient Medications      REVIEW OF SYSTEMS  --------------------------------------------------------------------------------      All other systems were reviewed and are negative, except as noted.    VITALS/PHYSICAL EXAM  --------------------------------------------------------------------------------  T(C): 37.6 (12-10-22 @ 04:00), Max: 38.3 (12-09-22 @ 19:00)  HR: 118 (12-10-22 @ 10:03) (90 - 124)  BP: --  RR: 29 (12-10-22 @ 09:30) (9 - 29)  SpO2: 99% (12-10-22 @ 10:03) (98% - 100%)  Wt(kg): --        12-09-22 @ 07:01  -  12-10-22 @ 07:00  --------------------------------------------------------  IN: 3186.9 mL / OUT: 3194 mL / NET: -7.1 mL    12-10-22 @ 07:01  -  12-10-22 @ 13:05  --------------------------------------------------------  IN: 639.6 mL / OUT: 70 mL / NET: 569.6 mL        Physical Exam:  	Gen: NAD  	HEENT: Orally intubated   	Pulm: mechanically ventilated   	CV: Tachycardic   	Abd: Soft, +BS   	Ext: No LE edema B/L  	Neuro: Awake  	Skin: Warm and dry  	Vascular access: Right Non tunneled HD catheter      LABS/STUDIES  --------------------------------------------------------------------------------              9.5    17.94 >-----------<  150      [12-10-22 @ 00:47]              29.0     136  |  103  |  36  ----------------------------<  113      [12-10-22 @ 00:47]  4.6   |  21  |  2.19        Ca     7.5     [12-10-22 @ 00:47]      Mg     2.5     [12-10-22 @ 00:47]      Phos  3.4     [12-10-22 @ 00:47]    TPro  6.0  /  Alb  2.9  /  TBili  0.9  /  DBili  x   /  AST  55  /  ALT  15  /  AlkPhos  171  [12-10-22 @ 00:47]    PT/INR: PT 19.0 , INR 1.64       [12-10-22 @ 10:52]  PTT: 43.7       [12-10-22 @ 10:52]      Creatinine Trend:  SCr 2.19 [12-10 @ 00:47]  SCr 2.29 [12-09 @ 12:23]  SCr 3.10 [12-09 @ 00:19]  SCr 1.89 [12-08 @ 10:58]  SCr 1.66 [12-08 @ 00:36]    Urinalysis - [12-03-22 @ 14:45]      Color BROWN / Appearance Slightly Turbid / SG 1.015 / pH 5.5      Gluc Negative / Ketone Negative  / Bili Negative / Urobili 6 mg/dL       Blood Moderate / Protein 30 mg/dL / Leuk Est Negative / Nitrite Negative      RBC 43 / WBC 2 / Hyaline 2 / Gran  / Sq Epi  / Non Sq Epi 1 / Bacteria Negative      TSH 0.60      [11-26-22 @ 05:24]  Lipid: chol --, , HDL --, LDL --      [12-05-22 @ 00:50]       [facility] DIVISION OF KIDNEY DISEASES AND HYPERTENSION -- FOLLOW UP NOTE  --------------------------------------------------------------------------------  Chief Complaint:    24 hour events/subjective:    Patient tolerating CRRT, Clotted this morning, Patient potassium uptrend   CXR performed demonstrating Left pleural effusion.    Patient awake and following commands      PAST HISTORY  --------------------------------------------------------------------------------  No significant changes to PMH, PSH, FHx, SHx, unless otherwise noted    ALLERGIES & MEDICATIONS  --------------------------------------------------------------------------------  Allergies    No Known Allergies    Intolerances      Standing Inpatient Medications  albumin human  5% IVPB 250 milliLiter(s) IV Intermittent once  aMIOdarone Infusion 0.5 mG/Min IV Continuous <Continuous>  argatroban Infusion 0.25 MICROgram(s)/kG/Min IV Continuous <Continuous>  atorvastatin 40 milliGRAM(s) Oral at bedtime  chlorhexidine 0.12% Liquid 15 milliLiter(s) Oral Mucosa every 12 hours  chlorhexidine 2% Cloths 1 Application(s) Topical <User Schedule>  CRRT Treatment    <Continuous>  dexMEDEtomidine Infusion 1 MICROgram(s)/kG/Hr IV Continuous <Continuous>  insulin regular Infusion 3 Unit(s)/Hr IV Continuous <Continuous>  milrinone Infusion 0.2 MICROgram(s)/kG/Min IV Continuous <Continuous>  norepinephrine Infusion 0.05 MICROgram(s)/kG/Min IV Continuous <Continuous>  pantoprazole  Injectable 40 milliGRAM(s) IV Push two times a day  Phoxillum Filtration BK 4 / 2.5 5000 milliLiter(s) CRRT <Continuous>  polyethylene glycol 3350 17 Gram(s) Oral two times a day  PrismaSATE Dialysate BK 0 / 3.5 5000 milliLiter(s) CRRT <Continuous>  PrismaSOL Filtration BGK 4 / 2.5 5000 milliLiter(s) CRRT <Continuous>  senna 2 Tablet(s) Oral at bedtime  sodium chloride 0.9%. 1000 milliLiter(s) IV Continuous <Continuous>  vasopressin Infusion 0.033 Unit(s)/Min IV Continuous <Continuous>    PRN Inpatient Medications      REVIEW OF SYSTEMS  --------------------------------------------------------------------------------      All other systems were reviewed and are negative, except as noted.    VITALS/PHYSICAL EXAM  --------------------------------------------------------------------------------  T(C): 37.6 (12-10-22 @ 04:00), Max: 38.3 (12-09-22 @ 19:00)  HR: 118 (12-10-22 @ 10:03) (90 - 124)  BP: --  RR: 29 (12-10-22 @ 09:30) (9 - 29)  SpO2: 99% (12-10-22 @ 10:03) (98% - 100%)  Wt(kg): --        12-09-22 @ 07:01  -  12-10-22 @ 07:00  --------------------------------------------------------  IN: 3186.9 mL / OUT: 3194 mL / NET: -7.1 mL    12-10-22 @ 07:01  -  12-10-22 @ 13:05  --------------------------------------------------------  IN: 639.6 mL / OUT: 70 mL / NET: 569.6 mL        Physical Exam:  	Gen: NAD  	HEENT: Orally intubated   	Pulm: mechanically ventilated   	CV: Tachycardic   	Abd: Soft, +BS   	Ext: No LE edema B/L  	Neuro: Awake  	Skin: Warm and dry  	Vascular access: Right Non tunneled HD catheter      LABS/STUDIES  --------------------------------------------------------------------------------              9.5    17.94 >-----------<  150      [12-10-22 @ 00:47]              29.0     136  |  103  |  36  ----------------------------<  113      [12-10-22 @ 00:47]  4.6   |  21  |  2.19        Ca     7.5     [12-10-22 @ 00:47]      Mg     2.5     [12-10-22 @ 00:47]      Phos  3.4     [12-10-22 @ 00:47]    TPro  6.0  /  Alb  2.9  /  TBili  0.9  /  DBili  x   /  AST  55  /  ALT  15  /  AlkPhos  171  [12-10-22 @ 00:47]    PT/INR: PT 19.0 , INR 1.64       [12-10-22 @ 10:52]  PTT: 43.7       [12-10-22 @ 10:52]      Creatinine Trend:  SCr 2.19 [12-10 @ 00:47]  SCr 2.29 [12-09 @ 12:23]  SCr 3.10 [12-09 @ 00:19]  SCr 1.89 [12-08 @ 10:58]  SCr 1.66 [12-08 @ 00:36]    Urinalysis - [12-03-22 @ 14:45]      Color BROWN / Appearance Slightly Turbid / SG 1.015 / pH 5.5      Gluc Negative / Ketone Negative  / Bili Negative / Urobili 6 mg/dL       Blood Moderate / Protein 30 mg/dL / Leuk Est Negative / Nitrite Negative      RBC 43 / WBC 2 / Hyaline 2 / Gran  / Sq Epi  / Non Sq Epi 1 / Bacteria Negative      TSH 0.60      [11-26-22 @ 05:24]  Lipid: chol --, , HDL --, LDL --      [12-05-22 @ 00:50]

## 2022-12-10 NOTE — PROGRESS NOTE ADULT - PROBLEM SELECTOR PLAN 4
- ECMO decannulated 12/08 after successful wean  - IABP in place, keep 1:1, target augmented MAP 70s, start nitro gtt today to achieve target, plan for IABP wean tomorrow  - cont argatroban for AC while on IABP  - send infectious workup and start broad spec abx given lack of augmentation of MAP w/ IABP and concern for vasoplegia/febrile  - lower milrinone  - added levo given concern for septic component

## 2022-12-11 LAB
ALBUMIN SERPL ELPH-MCNC: 2.8 G/DL — LOW (ref 3.3–5)
ALP SERPL-CCNC: 146 U/L — HIGH (ref 40–120)
ALT FLD-CCNC: 15 U/L — SIGNIFICANT CHANGE UP (ref 10–45)
ANION GAP SERPL CALC-SCNC: 14 MMOL/L — SIGNIFICANT CHANGE UP (ref 5–17)
APTT BLD: 38.6 SEC — HIGH (ref 27.5–35.5)
APTT BLD: 41.6 SEC — HIGH (ref 27.5–35.5)
APTT BLD: 44.7 SEC — HIGH (ref 27.5–35.5)
AST SERPL-CCNC: 50 U/L — HIGH (ref 10–40)
BASE EXCESS BLDV CALC-SCNC: -2.3 MMOL/L — LOW (ref -2–3)
BASE EXCESS BLDV CALC-SCNC: 0.3 MMOL/L — SIGNIFICANT CHANGE UP (ref -2–3)
BASE EXCESS BLDV CALC-SCNC: 0.7 MMOL/L — SIGNIFICANT CHANGE UP (ref -2–3)
BASE EXCESS BLDV CALC-SCNC: 0.8 MMOL/L — SIGNIFICANT CHANGE UP (ref -2–3)
BILIRUB SERPL-MCNC: 1.5 MG/DL — HIGH (ref 0.2–1.2)
BUN SERPL-MCNC: 31 MG/DL — HIGH (ref 7–23)
CA-I SERPL-SCNC: 1.2 MMOL/L — SIGNIFICANT CHANGE UP (ref 1.15–1.33)
CALCIUM SERPL-MCNC: 8.5 MG/DL — SIGNIFICANT CHANGE UP (ref 8.4–10.5)
CHLORIDE BLDV-SCNC: 101 MMOL/L — SIGNIFICANT CHANGE UP (ref 96–108)
CHLORIDE SERPL-SCNC: 100 MMOL/L — SIGNIFICANT CHANGE UP (ref 96–108)
CO2 BLDV-SCNC: 24 MMOL/L — SIGNIFICANT CHANGE UP (ref 22–26)
CO2 BLDV-SCNC: 26 MMOL/L — SIGNIFICANT CHANGE UP (ref 22–26)
CO2 BLDV-SCNC: 27 MMOL/L — HIGH (ref 22–26)
CO2 SERPL-SCNC: 19 MMOL/L — LOW (ref 22–31)
CREAT SERPL-MCNC: 1.84 MG/DL — HIGH (ref 0.5–1.3)
CULTURE RESULTS: SIGNIFICANT CHANGE UP
EGFR: 41 ML/MIN/1.73M2 — LOW
GAS PNL BLDA: SIGNIFICANT CHANGE UP
GAS PNL BLDV: 131 MMOL/L — LOW (ref 136–145)
GAS PNL BLDV: SIGNIFICANT CHANGE UP
GLUCOSE BLDC GLUCOMTR-MCNC: 103 MG/DL — HIGH (ref 70–99)
GLUCOSE BLDC GLUCOMTR-MCNC: 104 MG/DL — HIGH (ref 70–99)
GLUCOSE BLDC GLUCOMTR-MCNC: 105 MG/DL — HIGH (ref 70–99)
GLUCOSE BLDC GLUCOMTR-MCNC: 108 MG/DL — HIGH (ref 70–99)
GLUCOSE BLDC GLUCOMTR-MCNC: 113 MG/DL — HIGH (ref 70–99)
GLUCOSE BLDC GLUCOMTR-MCNC: 115 MG/DL — HIGH (ref 70–99)
GLUCOSE BLDC GLUCOMTR-MCNC: 118 MG/DL — HIGH (ref 70–99)
GLUCOSE BLDC GLUCOMTR-MCNC: 119 MG/DL — HIGH (ref 70–99)
GLUCOSE BLDC GLUCOMTR-MCNC: 120 MG/DL — HIGH (ref 70–99)
GLUCOSE BLDC GLUCOMTR-MCNC: 121 MG/DL — HIGH (ref 70–99)
GLUCOSE BLDC GLUCOMTR-MCNC: 130 MG/DL — HIGH (ref 70–99)
GLUCOSE BLDC GLUCOMTR-MCNC: 132 MG/DL — HIGH (ref 70–99)
GLUCOSE BLDC GLUCOMTR-MCNC: 139 MG/DL — HIGH (ref 70–99)
GLUCOSE BLDC GLUCOMTR-MCNC: 141 MG/DL — HIGH (ref 70–99)
GLUCOSE BLDC GLUCOMTR-MCNC: 144 MG/DL — HIGH (ref 70–99)
GLUCOSE BLDC GLUCOMTR-MCNC: 159 MG/DL — HIGH (ref 70–99)
GLUCOSE BLDC GLUCOMTR-MCNC: 162 MG/DL — HIGH (ref 70–99)
GLUCOSE BLDC GLUCOMTR-MCNC: 171 MG/DL — HIGH (ref 70–99)
GLUCOSE BLDC GLUCOMTR-MCNC: 177 MG/DL — HIGH (ref 70–99)
GLUCOSE BLDC GLUCOMTR-MCNC: 65 MG/DL — LOW (ref 70–99)
GLUCOSE BLDV-MCNC: 121 MG/DL — HIGH (ref 70–99)
GLUCOSE SERPL-MCNC: 175 MG/DL — HIGH (ref 70–99)
HCO3 BLDV-SCNC: 22 MMOL/L — SIGNIFICANT CHANGE UP (ref 22–29)
HCO3 BLDV-SCNC: 25 MMOL/L — SIGNIFICANT CHANGE UP (ref 22–29)
HCT VFR BLD CALC: 26.7 % — LOW (ref 39–50)
HCT VFR BLDA CALC: 26 % — LOW (ref 39–51)
HGB BLD CALC-MCNC: 8.5 G/DL — LOW (ref 12.6–17.4)
HGB BLD-MCNC: 8.9 G/DL — LOW (ref 13–17)
HOROWITZ INDEX BLDV+IHG-RTO: 40 — SIGNIFICANT CHANGE UP
LACTATE BLDV-MCNC: 1.1 MMOL/L — SIGNIFICANT CHANGE UP (ref 0.5–2)
MAGNESIUM SERPL-MCNC: 2.2 MG/DL — SIGNIFICANT CHANGE UP (ref 1.6–2.6)
MCHC RBC-ENTMCNC: 29.2 PG — SIGNIFICANT CHANGE UP (ref 27–34)
MCHC RBC-ENTMCNC: 33.3 GM/DL — SIGNIFICANT CHANGE UP (ref 32–36)
MCV RBC AUTO: 87.5 FL — SIGNIFICANT CHANGE UP (ref 80–100)
NRBC # BLD: 2 /100 WBCS — HIGH (ref 0–0)
PCO2 BLDV: 37 MMHG — LOW (ref 42–55)
PCO2 BLDV: 38 MMHG — LOW (ref 42–55)
PCO2 BLDV: 39 MMHG — LOW (ref 42–55)
PCO2 BLDV: 40 MMHG — LOW (ref 42–55)
PH BLDV: 7.38 — SIGNIFICANT CHANGE UP (ref 7.32–7.43)
PH BLDV: 7.41 — SIGNIFICANT CHANGE UP (ref 7.32–7.43)
PH BLDV: 7.42 — SIGNIFICANT CHANGE UP (ref 7.32–7.43)
PH BLDV: 7.43 — SIGNIFICANT CHANGE UP (ref 7.32–7.43)
PHOSPHATE SERPL-MCNC: 4 MG/DL — SIGNIFICANT CHANGE UP (ref 2.5–4.5)
PLATELET # BLD AUTO: 129 K/UL — LOW (ref 150–400)
PO2 BLDV: 29 MMHG — SIGNIFICANT CHANGE UP (ref 25–45)
PO2 BLDV: 33 MMHG — SIGNIFICANT CHANGE UP (ref 25–45)
PO2 BLDV: 34 MMHG — SIGNIFICANT CHANGE UP (ref 25–45)
POTASSIUM BLDV-SCNC: 3.6 MMOL/L — SIGNIFICANT CHANGE UP (ref 3.5–5.1)
POTASSIUM SERPL-MCNC: 4.4 MMOL/L — SIGNIFICANT CHANGE UP (ref 3.5–5.3)
POTASSIUM SERPL-SCNC: 4.4 MMOL/L — SIGNIFICANT CHANGE UP (ref 3.5–5.3)
PROT SERPL-MCNC: 6 G/DL — SIGNIFICANT CHANGE UP (ref 6–8.3)
RBC # BLD: 3.05 M/UL — LOW (ref 4.2–5.8)
RBC # FLD: 14.8 % — HIGH (ref 10.3–14.5)
SAO2 % BLDV: 44.6 % — LOW (ref 67–88)
SAO2 % BLDV: 51.8 % — LOW (ref 67–88)
SAO2 % BLDV: 53.2 % — LOW (ref 67–88)
SODIUM SERPL-SCNC: 133 MMOL/L — LOW (ref 135–145)
SPECIMEN SOURCE: SIGNIFICANT CHANGE UP
VANCOMYCIN TROUGH SERPL-MCNC: 10.1 UG/ML — SIGNIFICANT CHANGE UP (ref 10–20)
WBC # BLD: 18.09 K/UL — HIGH (ref 3.8–10.5)
WBC # FLD AUTO: 18.09 K/UL — HIGH (ref 3.8–10.5)

## 2022-12-11 PROCEDURE — 99233 SBSQ HOSP IP/OBS HIGH 50: CPT | Mod: GC

## 2022-12-11 PROCEDURE — 71045 X-RAY EXAM CHEST 1 VIEW: CPT | Mod: 26

## 2022-12-11 PROCEDURE — 74176 CT ABD & PELVIS W/O CONTRAST: CPT | Mod: 26

## 2022-12-11 PROCEDURE — 99232 SBSQ HOSP IP/OBS MODERATE 35: CPT

## 2022-12-11 PROCEDURE — 71045 X-RAY EXAM CHEST 1 VIEW: CPT | Mod: 26,77

## 2022-12-11 PROCEDURE — 71250 CT THORAX DX C-: CPT | Mod: 26

## 2022-12-11 PROCEDURE — 99291 CRITICAL CARE FIRST HOUR: CPT

## 2022-12-11 RX ORDER — AMIODARONE HYDROCHLORIDE 400 MG/1
TABLET ORAL
Refills: 0 | Status: DISCONTINUED | OUTPATIENT
Start: 2022-12-14 | End: 2022-12-16

## 2022-12-11 RX ORDER — DIGOXIN 250 MCG
500 TABLET ORAL ONCE
Refills: 0 | Status: COMPLETED | OUTPATIENT
Start: 2022-12-11 | End: 2022-12-11

## 2022-12-11 RX ORDER — POTASSIUM CHLORIDE 20 MEQ
20 PACKET (EA) ORAL ONCE
Refills: 0 | Status: COMPLETED | OUTPATIENT
Start: 2022-12-11 | End: 2022-12-11

## 2022-12-11 RX ORDER — FLUCONAZOLE 150 MG/1
TABLET ORAL
Refills: 0 | Status: DISCONTINUED | OUTPATIENT
Start: 2022-12-11 | End: 2022-12-16

## 2022-12-11 RX ORDER — AMIODARONE HYDROCHLORIDE 400 MG/1
400 TABLET ORAL EVERY 8 HOURS
Refills: 0 | Status: COMPLETED | OUTPATIENT
Start: 2022-12-11 | End: 2022-12-14

## 2022-12-11 RX ORDER — FLUCONAZOLE 150 MG/1
200 TABLET ORAL EVERY 24 HOURS
Refills: 0 | Status: DISCONTINUED | OUTPATIENT
Start: 2022-12-12 | End: 2022-12-16

## 2022-12-11 RX ORDER — CALCIUM GLUCONATE 100 MG/ML
2 VIAL (ML) INTRAVENOUS ONCE
Refills: 0 | Status: COMPLETED | OUTPATIENT
Start: 2022-12-11 | End: 2022-12-11

## 2022-12-11 RX ORDER — FLUCONAZOLE 150 MG/1
200 TABLET ORAL ONCE
Refills: 0 | Status: COMPLETED | OUTPATIENT
Start: 2022-12-11 | End: 2022-12-11

## 2022-12-11 RX ORDER — ONDANSETRON 8 MG/1
4 TABLET, FILM COATED ORAL ONCE
Refills: 0 | Status: COMPLETED | OUTPATIENT
Start: 2022-12-11 | End: 2022-12-11

## 2022-12-11 RX ORDER — AMIODARONE HYDROCHLORIDE 400 MG/1
200 TABLET ORAL DAILY
Refills: 0 | Status: DISCONTINUED | OUTPATIENT
Start: 2022-12-15 | End: 2022-12-16

## 2022-12-11 RX ORDER — HEPARIN SODIUM 5000 [USP'U]/ML
800 INJECTION INTRAVENOUS; SUBCUTANEOUS
Qty: 25000 | Refills: 0 | Status: DISCONTINUED | OUTPATIENT
Start: 2022-12-11 | End: 2022-12-16

## 2022-12-11 RX ADMIN — Medication 200 GRAM(S): at 14:48

## 2022-12-11 RX ADMIN — DEXMEDETOMIDINE HYDROCHLORIDE IN 0.9% SODIUM CHLORIDE 23.2 MICROGRAM(S)/KG/HR: 4 INJECTION INTRAVENOUS at 04:00

## 2022-12-11 RX ADMIN — CHLORHEXIDINE GLUCONATE 15 MILLILITER(S): 213 SOLUTION TOPICAL at 17:02

## 2022-12-11 RX ADMIN — INSULIN HUMAN 3 UNIT(S)/HR: 100 INJECTION, SOLUTION SUBCUTANEOUS at 03:59

## 2022-12-11 RX ADMIN — POLYETHYLENE GLYCOL 3350 17 GRAM(S): 17 POWDER, FOR SOLUTION ORAL at 17:02

## 2022-12-11 RX ADMIN — Medication 20 MILLIEQUIVALENT(S): at 15:18

## 2022-12-11 RX ADMIN — CEFEPIME 100 MILLIGRAM(S): 1 INJECTION, POWDER, FOR SOLUTION INTRAMUSCULAR; INTRAVENOUS at 06:13

## 2022-12-11 RX ADMIN — Medication 500 MICROGRAM(S): at 21:57

## 2022-12-11 RX ADMIN — FLUCONAZOLE 100 MILLIGRAM(S): 150 TABLET ORAL at 08:12

## 2022-12-11 RX ADMIN — PANTOPRAZOLE SODIUM 40 MILLIGRAM(S): 20 TABLET, DELAYED RELEASE ORAL at 17:01

## 2022-12-11 RX ADMIN — CEFEPIME 100 MILLIGRAM(S): 1 INJECTION, POWDER, FOR SOLUTION INTRAMUSCULAR; INTRAVENOUS at 22:39

## 2022-12-11 RX ADMIN — CHLORHEXIDINE GLUCONATE 1 APPLICATION(S): 213 SOLUTION TOPICAL at 06:13

## 2022-12-11 RX ADMIN — CHLORHEXIDINE GLUCONATE 15 MILLILITER(S): 213 SOLUTION TOPICAL at 06:13

## 2022-12-11 RX ADMIN — ARGATROBAN 1.67 MICROGRAM(S)/KG/MIN: 50 INJECTION, SOLUTION INTRAVENOUS at 03:58

## 2022-12-11 RX ADMIN — PANTOPRAZOLE SODIUM 40 MILLIGRAM(S): 20 TABLET, DELAYED RELEASE ORAL at 06:13

## 2022-12-11 RX ADMIN — AMIODARONE HYDROCHLORIDE 400 MILLIGRAM(S): 400 TABLET ORAL at 18:01

## 2022-12-11 RX ADMIN — Medication 250 MILLIGRAM(S): at 06:39

## 2022-12-11 RX ADMIN — CEFEPIME 100 MILLIGRAM(S): 1 INJECTION, POWDER, FOR SOLUTION INTRAMUSCULAR; INTRAVENOUS at 13:03

## 2022-12-11 RX ADMIN — VASOPRESSIN 5 UNIT(S)/MIN: 20 INJECTION INTRAVENOUS at 03:59

## 2022-12-11 RX ADMIN — MILRINONE LACTATE 5.57 MICROGRAM(S)/KG/MIN: 1 INJECTION, SOLUTION INTRAVENOUS at 03:59

## 2022-12-11 RX ADMIN — Medication 250 MILLIGRAM(S): at 17:05

## 2022-12-11 RX ADMIN — Medication 8.71 MICROGRAM(S)/KG/MIN: at 03:59

## 2022-12-11 RX ADMIN — AMIODARONE HYDROCHLORIDE 16.7 MG/MIN: 400 TABLET ORAL at 03:59

## 2022-12-11 RX ADMIN — AMIODARONE HYDROCHLORIDE 400 MILLIGRAM(S): 400 TABLET ORAL at 10:37

## 2022-12-11 RX ADMIN — ONDANSETRON 4 MILLIGRAM(S): 8 TABLET, FILM COATED ORAL at 21:48

## 2022-12-11 NOTE — PROGRESS NOTE ADULT - SUBJECTIVE AND OBJECTIVE BOX
Patient seen and examined at the bedside.    Remained critically ill on continuous ICU monitoring.    OBJECTIVE:  Vital Signs Last 24 Hrs  T(C): 37.5 (11 Dec 2022 20:00), Max: 37.5 (11 Dec 2022 20:00)  T(F): 99.5 (11 Dec 2022 20:00), Max: 99.5 (11 Dec 2022 20:00)  HR: 107 (11 Dec 2022 19:15) (75 - 112)  BP: --  BP(mean): --  RR: 10 (11 Dec 2022 19:15) (2 - 29)  SpO2: 100% (11 Dec 2022 19:15) (96% - 100%)    Parameters below as of 11 Dec 2022 20:00  Patient On (Oxygen Delivery Method): ventilator    O2 Concentration (%): 40      Physical Exam:   General: Intubated, multiple lines gtt  Neurology: on/off sedation  Eyes: bilateral pupils equal and reactive   ENT/Neck: +ETT midline, Neck supple, trachea midline, No JVD   Respiratory: rhonchi bilaterally   CV: S1S2, no murmurs        [x] Sinus Tachycardia  Abdominal: Softly distended,+BS   Extremities: 1+ pedal edema noted, + peripheral pulses palpable, warm  Skin: No Rashes, Hematoma, Ecchymosis                                               Assessment:  63 y/o male with PMH of CABG, DM, HTN, HLD presenting as transfer from [place] for c/f STEMI. PTA arrival received 325 aspirin, 600 plavix, and no heparin drip started. Around 1:30 am patient had multiple episodes of non-bloody diarrhea and emesis with associated abdominal pain and chest pain, unable to localize, non-radiating, with associated SOB and no associated palpitations or diaphoresis. No recent fevers/chills, cough, rashes, or changes in urination. Does report mild diffuse back pain; started 5 days ago in setting on injury while walking and lifting objects. Denies focal weakness, numbness/tingling, or LOC due does report mild dizziness.    acute respiratory distress/failure, intubated ,   cardiogenic shock s/p [facility] ECMO decannulated 12/8  CAD/ASHD/CABG, acute MI  cardiogenic shock  Hemodynamic instability  acute renal failure  encounter management IABP  encounter weaning ventilator  Leukocytosis   Thrombocytopenia  Anemia  acute pancreatitis    Today:  - CPAP 15/4 started at 4:45  - Argatroban switched to Heparin  - IABP weaning failed today  - Switch from IV to PO Amio load  - Plan for extubation tomorrow ??    Plan:   ***Neuro***  CT head showed 4mm subdural hematoma 11/26: repeat CT head unchanged 12/01,  [x] Sedated with [x] Precedex for vent synchrony  Post operative neuro assessment   Soft palate laceration, ENT scoped 12/8, stable, no acute intervention at this time      ***Cardiovascular***  12/7 TTE turndown EF 30-40%, normal RV, MR mod   12/8 GRZEGORZ EF 40-45%, MR, TR, normal RV function  Invasive hemodynamic monitoring, assess perfusion indices   ST / CVP 9/ MAP 63/ Hct 26.7/ Lactate 1.1  [x] Primacor- 0.2 -> 0.1 mcgs/kg/min [x] Vasopressin 0.033 U/Min   [x] 12/7 cath patent LIMA graft and other grafts occluded, femoral IABP placed, with good augmentation @ 1:1,  [x] 12/8 ECMO d/c'd  Failed IABP wean today   PO Amiodarone for rate control  Continuos reassessment of hemodynamics   12/8 Aflutter s/p DCCV x2  [x] AC Therapy with Argatroban gtt pAF/flutter transitioned to Heparin gtt PTT 44  [x] Statin   11/29 HIT NG, 12/8 MARY Neg      ***Pulmonary***  Post op vent management   Titration of FiO2 and PEEP, follow SpO2, CXR, blood gasses   CPAP 15/4 started at 4:45 PM   Plan for extubation tomorrow??      Mode: CPAP with PS  FiO2: 40  PEEP: 5  PS: 15  MAP: 10  PIP: 21              ***GI***  [x] NPO with TF's Glucerna 1.5 @ goal of 60cc/hr   [x] Protonix    Bowel regimen with Miralax and senna      ***Renal***  ERIC, renal following CVVHD started 12/5  Continue to monitor I/Os, BUN/Creatinine.   Replete lytes PRN  De Dios present      ***ID***  Empiric coverage with Vancomycin and Cefepime  Diflucan for prophiolaxia   Plan to CT to rule out infection       ***Endocrine***  [x]  DM2: HbA1c 7.3%                - [x] Insulin gtt              - Need tight glycemic control to prevent wound infection.          Patient requires continuous monitoring with bedside rhythm monitoring, pulse oximetry monitoring, and continuous central venous and arterial pressure monitoring; and intermittent blood gas analysis. Care plan discussed with the ICU care team.   Patient remained critical, at risk for life threatening decompensation.    I have spent 30 minutes providing critical care management to this patient.    By signing my name below, I, Abhijit Ngo, attest that this documentation has been prepared under the direction and in the presence of BROOKE Araya   Electronically signed: Brian Slaughter, 12-11-22 @ 19:50    I, BROOKE Araya, personally performed the services described in this documentation. all medical record entries made by the scribe were at my direction and in my presence. I have reviewed the chart and agree that the record reflects my personal performance and is accurate and complete  Electronically signed: BROOKE Araya  Patient seen and examined at the bedside.    Remained critically ill on continuous ICU monitoring.    OBJECTIVE:  Vital Signs Last 24 Hrs  T(C): 37.5 (11 Dec 2022 20:00), Max: 37.5 (11 Dec 2022 20:00)  T(F): 99.5 (11 Dec 2022 20:00), Max: 99.5 (11 Dec 2022 20:00)  HR: 107 (11 Dec 2022 19:15) (75 - 112)  BP: --  BP(mean): --  RR: 10 (11 Dec 2022 19:15) (2 - 29)  SpO2: 100% (11 Dec 2022 19:15) (96% - 100%)    Parameters below as of 11 Dec 2022 20:00  Patient On (Oxygen Delivery Method): ventilator    O2 Concentration (%): 40      Physical Exam:   General: Intubated, multiple lines gtt  Neurology: on/off sedation  Eyes: bilateral pupils equal and reactive   ENT/Neck: +ETT midline, Neck supple, trachea midline, No JVD   Respiratory: rhonchi bilaterally   CV: S1S2, no murmurs        [x] Sinus Tachycardia  Abdominal: Softly distended,+BS   Extremities: 1+ pedal edema noted, + peripheral pulses palpable, warm  Skin: No Rashes, Hematoma, Ecchymosis                                               Assessment:  61 y/o male with PMH of CABG, DM, HTN, HLD presenting as transfer from [place] for c/f STEMI. PTA arrival received 325 aspirin, 600 plavix, and no heparin drip started. Around 1:30 am patient had multiple episodes of non-bloody diarrhea and emesis with associated abdominal pain and chest pain, unable to localize, non-radiating, with associated SOB and no associated palpitations or diaphoresis. No recent fevers/chills, cough, rashes, or changes in urination. Does report mild diffuse back pain; started 5 days ago in setting on injury while walking and lifting objects. Denies focal weakness, numbness/tingling, or LOC due does report mild dizziness.    acute respiratory distress/failure, intubated ,   cardiogenic shock s/p [facility] ECMO decannulated 12/8  CAD/ASHD/CABG, acute MI  cardiogenic shock  Hemodynamic instability  acute renal failure  encounter management IABP  encounter weaning ventilator  Leukocytosis   Thrombocytopenia  Anemia  acute pancreatitis    Today:  - CPAP 15/4 started at 4:45  - Argatroban switched to Heparin  - IABP weaning failed today  - Switch from IV to PO Amio load  - Plan for extubation tomorrow ??    Plan:   ***Neuro***  CT head showed 4mm subdural hematoma 11/26: repeat CT head unchanged 12/01,  [x] Sedated with [x] Precedex for vent synchrony  Post operative neuro assessment   Soft palate laceration, ENT scoped 12/8, stable, no acute intervention at this time      ***Cardiovascular***  12/7 TTE turndown EF 30-40%, normal RV, MR mod   12/8 GRZEGORZ EF 40-45%, MR, TR, normal RV function  Invasive hemodynamic monitoring, assess perfusion indices   ST / CVP 9/ MAP 63/ Hct 26.7/ Lactate 1.1  [x] Primacor- 0.2 -> 0.1 mcgs/kg/min [x] Vasopressin 0.033 U/Min   [x] 12/7 cath patent LIMA graft and other grafts occluded, femoral IABP placed, with good augmentation @ 1:1,  [x] 12/8 ECMO d/c'd  Failed IABP wean today   PO Amiodarone for rate control  Continuos reassessment of hemodynamics   12/8 Aflutter s/p DCCV x2  [x] AC Therapy with Argatroban gtt pAF/flutter transitioned to Heparin gtt PTT 44  [x] Statin   11/29 HIT NG, 12/8 MARY Neg      ***Pulmonary***  Post op vent management   Titration of FiO2 and PEEP, follow SpO2, CXR, blood gasses   CPAP 15/4 started at 4:45 PM   Plan for extubation tomorrow??      Mode: CPAP with PS  FiO2: 40  PEEP: 5  PS: 15  MAP: 10  PIP: 21              ***GI***  [x] NPO with TF's Glucerna 1.5 @ goal of 60cc/hr   [x] Protonix    Bowel regimen with Miralax and senna      ***Renal***  ERIC, renal following CVVHD started 12/5  Continue to monitor I/Os, BUN/Creatinine.   Replete lytes PRN  De Dios present      ***ID***  Empiric coverage with Vancomycin and Cefepime  Diflucan for prophiolaxia   Plan to CT to rule out infection       ***Endocrine***  [x]  DM2: HbA1c 7.3%                - [x] Insulin gtt              - Need tight glycemic control to prevent wound infection.          Patient requires continuous monitoring with bedside rhythm monitoring, pulse oximetry monitoring, and continuous central venous and arterial pressure monitoring; and intermittent blood gas analysis. Care plan discussed with the ICU care team.   Patient remained critical, at risk for life threatening decompensation.    I have spent 30 minutes providing critical care management to this patient.    By signing my name below, I, Abhijit Ngo, attest that this documentation has been prepared under the direction and in the presence of BROOKE Araya   Electronically signed: Brian Slaughter, 12-11-22 @ 19:50    I, BROOKE Araya, personally performed the services described in this documentation. all medical record entries made by the scribe were at my direction and in my presence. I have reviewed the chart and agree that the record reflects my personal performance and is accurate and complete  Electronically signed: BROOKE Araya  Patient seen and examined at the bedside.    Remained critically ill on continuous ICU monitoring.    OBJECTIVE:  Vital Signs Last 24 Hrs  T(C): 37.5 (11 Dec 2022 20:00), Max: 37.5 (11 Dec 2022 20:00)  T(F): 99.5 (11 Dec 2022 20:00), Max: 99.5 (11 Dec 2022 20:00)  HR: 107 (11 Dec 2022 19:15) (75 - 112)  BP: --  BP(mean): --  RR: 10 (11 Dec 2022 19:15) (2 - 29)  SpO2: 100% (11 Dec 2022 19:15) (96% - 100%)    Parameters below as of 11 Dec 2022 20:00  Patient On (Oxygen Delivery Method): ventilator    O2 Concentration (%): 40      Physical Exam:   General: Intubated, multiple lines gtt  Neurology: on/off sedation  Eyes: bilateral pupils equal and reactive   ENT/Neck: +ETT midline, Neck supple, trachea midline, No JVD   Respiratory: rhonchi bilaterally   CV: S1S2, no murmurs        [x] Sinus Tachycardia  Abdominal: Softly distended,+BS   Extremities: 1+ pedal edema noted, + peripheral pulses palpable, warm  Skin: No Rashes, Hematoma, Ecchymosis                                               Assessment:  63 y/o male with PMH of CABG, DM, HTN, HLD presenting as transfer from [place] for c/f STEMI. PTA arrival received 325 aspirin, 600 plavix, and no heparin drip started. Around 1:30 am patient had multiple episodes of non-bloody diarrhea and emesis with associated abdominal pain and chest pain, unable to localize, non-radiating, with associated SOB and no associated palpitations or diaphoresis. No recent fevers/chills, cough, rashes, or changes in urination. Does report mild diffuse back pain; started 5 days ago in setting on injury while walking and lifting objects. Denies focal weakness, numbness/tingling, or LOC due does report mild dizziness.    acute respiratory distress/failure, intubated ,   cardiogenic shock s/p [facility] ECMO decannulated 12/8  CAD/ASHD/CABG, acute MI  cardiogenic shock  Hemodynamic instability  acute renal failure  encounter management IABP  encounter weaning ventilator  Leukocytosis   Thrombocytopenia  Anemia  acute pancreatitis    Today:  - CPAP 15/4 started at 4:45  - Argatroban switched to Heparin  - IABP weaning failed today  - Switch from IV to PO Amio load  - Plan for extubation tomorrow ??    Plan:   ***Neuro***  CT head showed 4mm subdural hematoma 11/26: repeat CT head unchanged 12/01,  [x] Sedated with [x] Precedex for vent synchrony  Post operative neuro assessment   Soft palate laceration, ENT scoped 12/8, stable, no acute intervention at this time      ***Cardiovascular***  12/7 TTE turndown EF 30-40%, normal RV, MR mod   12/8 GRZEGORZ EF 40-45%, MR, TR, normal RV function  Invasive hemodynamic monitoring, assess perfusion indices   ST / CVP 9/ MAP 63/ Hct 26.7/ Lactate 1.1  [x] Primacor- 0.2 -> 0.1 mcgs/kg/min [x] Vasopressin 0.033 U/Min   [x] 12/7 cath patent LIMA graft and other grafts occluded, femoral IABP placed, with good augmentation @ 1:1,  [x] 12/8 ECMO d/c'd  Failed IABP wean today   PO Amiodarone for rate control  Continuos reassessment of hemodynamics   12/8 Aflutter s/p DCCV x2  [x] AC Therapy with Argatroban gtt pAF/flutter transitioned to Heparin gtt PTT 44  [x] Statin   11/29 HIT NG, 12/8 MARY Neg      ***Pulmonary***  Post op vent management   Titration of FiO2 and PEEP, follow SpO2, CXR, blood gasses   CPAP 15/4 started at 4:45 PM   Plan for extubation tomorrow??      Mode: CPAP with PS  FiO2: 40  PEEP: 5  PS: 15  MAP: 10  PIP: 21              ***GI***  [x] NPO with TF's Glucerna 1.5 @ goal of 60cc/hr   [x] Protonix    Bowel regimen with Miralax and senna      ***Renal***  ERIC, renal following CVVHD started 12/5  Continue to monitor I/Os, BUN/Creatinine.   Replete lytes PRN  De Dios present      ***ID***  Empiric coverage with Vancomycin and Cefepime  Diflucan for prophiolaxia   Plan to CT to rule out infection       ***Endocrine***  [x]  DM2: HbA1c 7.3%                - [x] Insulin gtt              - Need tight glycemic control to prevent wound infection.          Patient requires continuous monitoring with bedside rhythm monitoring, pulse oximetry monitoring, and continuous central venous and arterial pressure monitoring; and intermittent blood gas analysis. Care plan discussed with the ICU care team.   Patient remained critical, at risk for life threatening decompensation.    I have spent 30 minutes providing critical care management to this patient.    By signing my name below, I, Abhijit Ngo, attest that this documentation has been prepared under the direction and in the presence of BROOKE Araya   Electronically signed: Brian Slaughter, 12-11-22 @ 19:50    I, BROOKE Araya, personally performed the services described in this documentation. all medical record entries made by the scribe were at my direction and in my presence. I have reviewed the chart and agree that the record reflects my personal performance and is accurate and complete  Electronically signed: BROOKE Araya

## 2022-12-11 NOTE — PROGRESS NOTE ADULT - ASSESSMENT
62M CAD, CABG, DM, HTN, chol admitted with pancreatitis not necrotizing on the CT scan and cholecystitis.  Sent to SICU but developed respiratory failure presumed to be due to ARDS. Moved to the CTU for ECMO for cardiogenic shock vs ARDS.  ECMO out, IABP placed.  Leukocytosis    Fever, rising leucocytosis, SIRS/sepsis, ERIC  - can continue vancomycin  - cefepime added (CRRT dosing)  - repeat UC and BC sent and are pending  - f/u CT results    Please call the ID service [phone] with questions or concerns over the weekend 62M CAD, CABG, DM, HTN, chol admitted with pancreatitis not necrotizing on the CT scan and cholecystitis.  Sent to SICU but developed respiratory failure presumed to be due to ARDS. Moved to the CTU for ECMO for cardiogenic shock vs ARDS.  ECMO out, IABP placed.  Leukocytosis    Fever, rising leucocytosis, SIRS/sepsis, ERIC  - can continue vancomycin  - cefepime added (CRRT dosing)  - repeat UC and BC sent and are pending  - f/u CT results    Please call the ID service [phone] with questions or concerns over the weekend 62M CAD, CABG, DM, HTN, chol admitted with pancreatitis not necrotizing on the CT scan and cholecystitis.  Sent to SICU but developed respiratory failure presumed to be due to ARDS. Moved to the CTU for ECMO for cardiogenic shock vs ARDS.  ECMO out, IABP placed.  Leukocytosis    Fever, rising leucocytosis, SIRS/sepsis, ERIC  - can continue vancomycin  - cefepime added (CRRT dosing)  - repeat UC and BC sent and are pending  - f/u CT results    Please call the ID service [phone] with questions or concerns over the weekend

## 2022-12-11 NOTE — PROGRESS NOTE ADULT - SUBJECTIVE AND OBJECTIVE BOX
Patient seen and examined at the bedside.    Remained critically ill on continuous ICU monitoring.    OBJECTIVE:  Vital Signs Last 24 Hrs  T(C): 37.2 (11 Dec 2022 04:00), Max: 37.5 (10 Dec 2022 12:00)  T(F): 99 (11 Dec 2022 04:00), Max: 99.5 (10 Dec 2022 12:00)  HR: 80 (11 Dec 2022 06:15) (73 - 124)  BP: --  BP(mean): --  RR: 23 (11 Dec 2022 06:15) (2 - 30)  SpO2: 98% (11 Dec 2022 06:15) (91% - 100%)    Parameters below as of 11 Dec 2022 04:00  Patient On (Oxygen Delivery Method): ventilator    O2 Concentration (%): 40      Physical Exam:   General: Intubated, multiple lines gtt  Neurology: on/off sedation  Eyes: bilateral pupils equal and reactive   ENT/Neck: +ETT midline, Neck supple, trachea midline, No JVD   Respiratory: rhonchi bilaterally   CV: S1S2, no murmurs        [x] Sinus rhythm  Abdominal: Softly distended,+BS   Extremities: 1+ pedal edema noted, + peripheral pulses palpable, warm  Skin: No Rashes, Hematoma, Ecchymosis                         ============================I/O===========================   I&O's Detail    10 Dec 2022 07:01  -  11 Dec 2022 07:00  --------------------------------------------------------  IN:    Amiodarone: 400.8 mL    Argatroban: 21 mL    Argatroban: 5.1 mL    Dexmedetomidine: 788.6 mL    DOBUTamine: 14 mL    Glucerna: 580 mL    Insulin: 89 mL    Milrinone: 134.4 mL    Norepinephrine: 82.7 mL    sodium chloride 0.9%: 150 mL    Vasopressin: 295 mL  Total IN: 2560.6 mL    OUT:    Indwelling Catheter - Urethral (mL): 190 mL    Other (mL): 2241 mL  Total OUT: 2431 mL    Total NET: 129.6 mL        ============================ LABS =========================                        8.9    18.09 )-----------( 129      ( 11 Dec 2022 00:25 )             26.7     12-    133<L>  |  100  |  31<H>  ----------------------------<  175<H>  4.4   |  19<L>  |  1.84<H>    Ca    8.5      11 Dec 2022 00:26  Phos  4.0     12  Mg     2.2     12    TPro  6.0  /  Alb  2.8<L>  /  TBili  1.5<H>  /  DBili  x   /  AST  50<H>  /  ALT  15  /  AlkPhos  146<H>  12-11    LIVER FUNCTIONS - ( 11 Dec 2022 00:26 )  Alb: 2.8 g/dL / Pro: 6.0 g/dL / ALK PHOS: 146 U/L / ALT: 15 U/L / AST: 50 U/L / GGT: x           PT/INR - ( 10 Dec 2022 10:52 )   PT: 19.0 sec;   INR: 1.64 ratio         PTT - ( 11 Dec 2022 00:25 )  PTT:38.6 sec  ABG - ( 11 Dec 2022 06:00 )  pH, Arterial: 7.47  pH, Blood: x     /  pCO2: 30    /  pO2: 92    / HCO3: 22    / Base Excess: -1.4  /  SaO2: 98.7              Urinalysis Basic - ( 10 Dec 2022 20:01 )    Color: Brown / Appearance: Slightly Turbid / S.020 / pH: x  Gluc: x / Ketone: Trace  / Bili: Moderate / Urobili: Negative   Blood: x / Protein: 300 mg/dL / Nitrite: Positive   Leuk Esterase: Large / RBC: >50 /hpf / WBC >50 /HPF   Sq Epi: x / Non Sq Epi: 3 / Bacteria: Moderate      ======================Micro/Rad/Cardio=================  Culture: Reviewed   CXR: Reviewed  Echo: Reviewed  ======================================================  PAST MEDICAL & SURGICAL HISTORY:      ======================================================    Assessment:  63 y/o male with PMH of CABG, DM, HTN, HLD presenting as transfer from [place] for c/f STEMI. PTA arrival received 325 aspirin, 600 plavix, and no heparin drip started. Around 1:30 am patient had multiple episodes of non-bloody diarrhea and emesis with associated abdominal pain and chest pain, unable to localize, non-radiating, with associated SOB and no associated palpitations or diaphoresis. No recent fevers/chills, cough, rashes, or changes in urination. Does report mild diffuse back pain; started 5 days ago in setting on injury while walking and lifting objects. Denies focal weakness, numbness/tingling, or LOC due does report mild dizziness.    acute respiratory distress/failure, intubated ,   cardiogenic shock s/p VA ECMO decannulated   CAD/ASHD/CABG, acute MI  cardiogenic shock  Hemodynamic instability  acute renal failure  encounter management IABP  encounter weaning ventilator  Leukocytosis   Thrombocytopenia  Anemia  acute pancreatitis    ======================================================  Plan:   ***Neuro***  CT head showed 4mm subdural hematoma : repeat CT head unchanged ,  [x] Sedated with [x] Precedex for vent synchrony  Post operative neuro assessment   Soft palate laceration, ENT scoped , stable, no acute intervention at this time      ***Cardiovascular***   TTE turndown EF 30-40%, normal RV, MR mod    GRZEGORZ EF 40-45%, MR, TR, normal RV function  Invasive hemodynamic monitoring, assess perfusion indices   SR / CVP 21/ MAP 60/Hct 26.7/ Lactate 1.3  [x] Primacor- 0.2 mcgs/kg/min   [x] Amiodarone- 0.5 mg/min Levophed 0.017 [x] Vasopressin 0.1 U/Min   [x]  cath patent LIMA graft and other grafts occluded, femoral IABP placed, with good augmentation @ 1:1,  [x]  ECMO d/c'd  Continuos reassessment of hemodynamics    Aflutter s/p DCCV x2  [x] AC Therapy with Argatroban gtt pAF/flutter ptt 40-45  [x] Statin    HIT NG,  MARY Neg      ***Pulmonary***  Post op vent management   Titration of FiO2 and PEEP, follow SpO2, CXR, blood gasses   CPAP --> Held off on extubating   Plan for extubation today/ tomorrow     Mode: AC/ CMV (Assist Control/ Continuous Mandatory Ventilation)  RR (machine): 14  FiO2: 40  PEEP: 5  ITime: 1  MAP: 11  PC: 18  PIP: 24              ***GI***  [x] NPO with TF's Glucerna 1.5 @ 0cc/hr with goal of 60cc/hr   [x] Protonix    Bowel regimen with Miralax and senna      ***Renal***  ERIC, renal following CVVHD started   Continue to monitor I/Os, BUN/Creatinine.   Replete lytes PRN  Va present      ***ID***  Empiric coverage with Vancomycin and Cefepime  Diflucan for prophiolaxia   Plan to CT to rule out infection     ***Endocrine***  [x]  DM2: HbA1c 7.3%                - [x] Insulin gtt              - Need tight glycemic control to prevent wound infection.          Patient requires continuous monitoring with:  bedside rhythm monitoring, arterial line, pulse oximetry, ventilator management and monitoring; intermittent blood gas analysis.  Care plan discussed with ICU care team.  patient remain critical; required more than usual post op care; I have spent 35 minutes providing non routine post op care, revaluated multiple times during the day .     Care Plan discussed with the ICU care team. Patient remained critical, at risk for life threatening decompensation.     By signing my name below, I, Maria D'Amico, attest that this documentation has been prepared under the direction and in the presence of Yusef Millan MD.  Electronically signed: Maria D'Amico, Scribe, 22 @ 07:17    I, Monty Holbrook, personally performed the services described in this documentation. all medical record entries made by the scribe were at my direction and in my presence. I have reviewed the chart and agree that the record reflects my personal performance and is accurate and complete  Electronically signed: Yusef Millan MD. Patient seen and examined at the bedside.    Remained critically ill on continuous ICU monitoring.    OBJECTIVE:  Vital Signs Last 24 Hrs  T(C): 37.2 (11 Dec 2022 04:00), Max: 37.5 (10 Dec 2022 12:00)  T(F): 99 (11 Dec 2022 04:00), Max: 99.5 (10 Dec 2022 12:00)  HR: 80 (11 Dec 2022 06:15) (73 - 124)  BP: --  BP(mean): --  RR: 23 (11 Dec 2022 06:15) (2 - 30)  SpO2: 98% (11 Dec 2022 06:15) (91% - 100%)    Parameters below as of 11 Dec 2022 04:00  Patient On (Oxygen Delivery Method): ventilator    O2 Concentration (%): 40      Physical Exam:   General: Intubated, multiple lines gtt  Neurology: on/off sedation  Eyes: bilateral pupils equal and reactive   ENT/Neck: +ETT midline, Neck supple, trachea midline, No JVD   Respiratory: rhonchi bilaterally   CV: S1S2, no murmurs        [x] Sinus rhythm  Abdominal: Softly distended,+BS   Extremities: 1+ pedal edema noted, + peripheral pulses palpable, warm  Skin: No Rashes, Hematoma, Ecchymosis                         ============================I/O===========================   I&O's Detail    10 Dec 2022 07:01  -  11 Dec 2022 07:00  --------------------------------------------------------  IN:    Amiodarone: 400.8 mL    Argatroban: 21 mL    Argatroban: 5.1 mL    Dexmedetomidine: 788.6 mL    DOBUTamine: 14 mL    Glucerna: 580 mL    Insulin: 89 mL    Milrinone: 134.4 mL    Norepinephrine: 82.7 mL    sodium chloride 0.9%: 150 mL    Vasopressin: 295 mL  Total IN: 2560.6 mL    OUT:    Indwelling Catheter - Urethral (mL): 190 mL    Other (mL): 2241 mL  Total OUT: 2431 mL    Total NET: 129.6 mL        ============================ LABS =========================                        8.9    18.09 )-----------( 129      ( 11 Dec 2022 00:25 )             26.7     12-    133<L>  |  100  |  31<H>  ----------------------------<  175<H>  4.4   |  19<L>  |  1.84<H>    Ca    8.5      11 Dec 2022 00:26  Phos  4.0     12  Mg     2.2     12    TPro  6.0  /  Alb  2.8<L>  /  TBili  1.5<H>  /  DBili  x   /  AST  50<H>  /  ALT  15  /  AlkPhos  146<H>  12-11    LIVER FUNCTIONS - ( 11 Dec 2022 00:26 )  Alb: 2.8 g/dL / Pro: 6.0 g/dL / ALK PHOS: 146 U/L / ALT: 15 U/L / AST: 50 U/L / GGT: x           PT/INR - ( 10 Dec 2022 10:52 )   PT: 19.0 sec;   INR: 1.64 ratio         PTT - ( 11 Dec 2022 00:25 )  PTT:38.6 sec  ABG - ( 11 Dec 2022 06:00 )  pH, Arterial: 7.47  pH, Blood: x     /  pCO2: 30    /  pO2: 92    / HCO3: 22    / Base Excess: -1.4  /  SaO2: 98.7              Urinalysis Basic - ( 10 Dec 2022 20:01 )    Color: Brown / Appearance: Slightly Turbid / S.020 / pH: x  Gluc: x / Ketone: Trace  / Bili: Moderate / Urobili: Negative   Blood: x / Protein: 300 mg/dL / Nitrite: Positive   Leuk Esterase: Large / RBC: >50 /hpf / WBC >50 /HPF   Sq Epi: x / Non Sq Epi: 3 / Bacteria: Moderate      ======================Micro/Rad/Cardio=================  Culture: Reviewed   CXR: Reviewed  Echo: Reviewed  ======================================================  PAST MEDICAL & SURGICAL HISTORY:      ======================================================    Assessment:  61 y/o male with PMH of CABG, DM, HTN, HLD presenting as transfer from [place] for c/f STEMI. PTA arrival received 325 aspirin, 600 plavix, and no heparin drip started. Around 1:30 am patient had multiple episodes of non-bloody diarrhea and emesis with associated abdominal pain and chest pain, unable to localize, non-radiating, with associated SOB and no associated palpitations or diaphoresis. No recent fevers/chills, cough, rashes, or changes in urination. Does report mild diffuse back pain; started 5 days ago in setting on injury while walking and lifting objects. Denies focal weakness, numbness/tingling, or LOC due does report mild dizziness.    acute respiratory distress/failure, intubated ,   cardiogenic shock s/p VA ECMO decannulated   CAD/ASHD/CABG, acute MI  cardiogenic shock  Hemodynamic instability  acute renal failure  encounter management IABP  encounter weaning ventilator  Leukocytosis   Thrombocytopenia  Anemia  acute pancreatitis    ======================================================  Plan:   ***Neuro***  CT head showed 4mm subdural hematoma : repeat CT head unchanged ,  [x] Sedated with [x] Precedex for vent synchrony  Post operative neuro assessment   Soft palate laceration, ENT scoped , stable, no acute intervention at this time      ***Cardiovascular***   TTE turndown EF 30-40%, normal RV, MR mod    GRZEGORZ EF 40-45%, MR, TR, normal RV function  Invasive hemodynamic monitoring, assess perfusion indices   SR / CVP 21/ MAP 60/Hct 26.7/ Lactate 1.3  [x] Primacor- 0.2 mcgs/kg/min   [x] Amiodarone- 0.5 mg/min Levophed 0.017 [x] Vasopressin 0.1 U/Min   [x]  cath patent LIMA graft and other grafts occluded, femoral IABP placed, with good augmentation @ 1:1,  [x]  ECMO d/c'd  Continuos reassessment of hemodynamics    Aflutter s/p DCCV x2  [x] AC Therapy with Argatroban gtt pAF/flutter ptt 40-45  [x] Statin    HIT NG,  MARY Neg      ***Pulmonary***  Post op vent management   Titration of FiO2 and PEEP, follow SpO2, CXR, blood gasses   CPAP --> Held off on extubating   Plan for extubation today/ tomorrow     Mode: AC/ CMV (Assist Control/ Continuous Mandatory Ventilation)  RR (machine): 14  FiO2: 40  PEEP: 5  ITime: 1  MAP: 11  PC: 18  PIP: 24              ***GI***  [x] NPO with TF's Glucerna 1.5 @ 0cc/hr with goal of 60cc/hr   [x] Protonix    Bowel regimen with Miralax and senna      ***Renal***  ERIC, renal following CVVHD started   Continue to monitor I/Os, BUN/Creatinine.   Replete lytes PRN  Va present      ***ID***  Empiric coverage with Vancomycin and Cefepime  Diflucan for prophiolaxia   Plan to CT to rule out infection     ***Endocrine***  [x]  DM2: HbA1c 7.3%                - [x] Insulin gtt              - Need tight glycemic control to prevent wound infection.          Patient requires continuous monitoring with:  bedside rhythm monitoring, arterial line, pulse oximetry, ventilator management and monitoring; intermittent blood gas analysis.  Care plan discussed with ICU care team.  patient remain critical; required more than usual post op care; I have spent 35 minutes providing non routine post op care, revaluated multiple times during the day .     Care Plan discussed with the ICU care team. Patient remained critical, at risk for life threatening decompensation.     By signing my name below, I, Maria D'Amico, attest that this documentation has been prepared under the direction and in the presence of Yusef Millan MD.  Electronically signed: Maria D'Amico, Scribe, 22 @ 07:17    I, Monty Holbrook, personally performed the services described in this documentation. all medical record entries made by the scribe were at my direction and in my presence. I have reviewed the chart and agree that the record reflects my personal performance and is accurate and complete  Electronically signed: Yusef Millan MD. Patient seen and examined at the bedside.    Remained critically ill on continuous ICU monitoring.    OBJECTIVE:  Vital Signs Last 24 Hrs  T(C): 37.2 (11 Dec 2022 04:00), Max: 37.5 (10 Dec 2022 12:00)  T(F): 99 (11 Dec 2022 04:00), Max: 99.5 (10 Dec 2022 12:00)  HR: 80 (11 Dec 2022 06:15) (73 - 124)  BP: --  BP(mean): --  RR: 23 (11 Dec 2022 06:15) (2 - 30)  SpO2: 98% (11 Dec 2022 06:15) (91% - 100%)    Parameters below as of 11 Dec 2022 04:00  Patient On (Oxygen Delivery Method): ventilator    O2 Concentration (%): 40      Physical Exam:   General: Intubated, multiple lines gtt  Neurology: on/off sedation  Eyes: bilateral pupils equal and reactive   ENT/Neck: +ETT midline, Neck supple, trachea midline, No JVD   Respiratory: rhonchi bilaterally   CV: S1S2, no murmurs        [x] Sinus rhythm  Abdominal: Softly distended,+BS   Extremities: 1+ pedal edema noted, + peripheral pulses palpable, warm  Skin: No Rashes, Hematoma, Ecchymosis                         ============================I/O===========================   I&O's Detail    10 Dec 2022 07:01  -  11 Dec 2022 07:00  --------------------------------------------------------  IN:    Amiodarone: 400.8 mL    Argatroban: 21 mL    Argatroban: 5.1 mL    Dexmedetomidine: 788.6 mL    DOBUTamine: 14 mL    Glucerna: 580 mL    Insulin: 89 mL    Milrinone: 134.4 mL    Norepinephrine: 82.7 mL    sodium chloride 0.9%: 150 mL    Vasopressin: 295 mL  Total IN: 2560.6 mL    OUT:    Indwelling Catheter - Urethral (mL): 190 mL    Other (mL): 2241 mL  Total OUT: 2431 mL    Total NET: 129.6 mL        ============================ LABS =========================                        8.9    18.09 )-----------( 129      ( 11 Dec 2022 00:25 )             26.7     12-    133<L>  |  100  |  31<H>  ----------------------------<  175<H>  4.4   |  19<L>  |  1.84<H>    Ca    8.5      11 Dec 2022 00:26  Phos  4.0     12  Mg     2.2     12    TPro  6.0  /  Alb  2.8<L>  /  TBili  1.5<H>  /  DBili  x   /  AST  50<H>  /  ALT  15  /  AlkPhos  146<H>  12-11    LIVER FUNCTIONS - ( 11 Dec 2022 00:26 )  Alb: 2.8 g/dL / Pro: 6.0 g/dL / ALK PHOS: 146 U/L / ALT: 15 U/L / AST: 50 U/L / GGT: x           PT/INR - ( 10 Dec 2022 10:52 )   PT: 19.0 sec;   INR: 1.64 ratio         PTT - ( 11 Dec 2022 00:25 )  PTT:38.6 sec  ABG - ( 11 Dec 2022 06:00 )  pH, Arterial: 7.47  pH, Blood: x     /  pCO2: 30    /  pO2: 92    / HCO3: 22    / Base Excess: -1.4  /  SaO2: 98.7              Urinalysis Basic - ( 10 Dec 2022 20:01 )    Color: Brown / Appearance: Slightly Turbid / S.020 / pH: x  Gluc: x / Ketone: Trace  / Bili: Moderate / Urobili: Negative   Blood: x / Protein: 300 mg/dL / Nitrite: Positive   Leuk Esterase: Large / RBC: >50 /hpf / WBC >50 /HPF   Sq Epi: x / Non Sq Epi: 3 / Bacteria: Moderate      ======================Micro/Rad/Cardio=================  Culture: Reviewed   CXR: Reviewed  Echo: Reviewed  ======================================================  PAST MEDICAL & SURGICAL HISTORY:      ======================================================    Assessment:  63 y/o male with PMH of CABG, DM, HTN, HLD presenting as transfer from [place] for c/f STEMI. PTA arrival received 325 aspirin, 600 plavix, and no heparin drip started. Around 1:30 am patient had multiple episodes of non-bloody diarrhea and emesis with associated abdominal pain and chest pain, unable to localize, non-radiating, with associated SOB and no associated palpitations or diaphoresis. No recent fevers/chills, cough, rashes, or changes in urination. Does report mild diffuse back pain; started 5 days ago in setting on injury while walking and lifting objects. Denies focal weakness, numbness/tingling, or LOC due does report mild dizziness.    acute respiratory distress/failure, intubated ,   cardiogenic shock s/p VA ECMO decannulated   CAD/ASHD/CABG, acute MI  cardiogenic shock  Hemodynamic instability  acute renal failure  encounter management IABP  encounter weaning ventilator  Leukocytosis   Thrombocytopenia  Anemia  acute pancreatitis    ======================================================  Plan:   ***Neuro***  CT head showed 4mm subdural hematoma : repeat CT head unchanged ,  [x] Sedated with [x] Precedex for vent synchrony  Post operative neuro assessment   Soft palate laceration, ENT scoped , stable, no acute intervention at this time      ***Cardiovascular***   TTE turndown EF 30-40%, normal RV, MR mod    GRZEGORZ EF 40-45%, MR, TR, normal RV function  Invasive hemodynamic monitoring, assess perfusion indices   SR / CVP 21/ MAP 60/Hct 26.7/ Lactate 1.3  [x] Primacor- 0.2 mcgs/kg/min   [x] Amiodarone- 0.5 mg/min Levophed 0.017 [x] Vasopressin 0.1 U/Min   [x]  cath patent LIMA graft and other grafts occluded, femoral IABP placed, with good augmentation @ 1:1,  [x]  ECMO d/c'd  Continuos reassessment of hemodynamics    Aflutter s/p DCCV x2  [x] AC Therapy with Argatroban gtt pAF/flutter ptt 40-45  [x] Statin    HIT NG,  MARY Neg      ***Pulmonary***  Post op vent management   Titration of FiO2 and PEEP, follow SpO2, CXR, blood gasses   CPAP --> Held off on extubating   Plan for extubation today/ tomorrow     Mode: AC/ CMV (Assist Control/ Continuous Mandatory Ventilation)  RR (machine): 14  FiO2: 40  PEEP: 5  ITime: 1  MAP: 11  PC: 18  PIP: 24              ***GI***  [x] NPO with TF's Glucerna 1.5 @ 0cc/hr with goal of 60cc/hr   [x] Protonix    Bowel regimen with Miralax and senna      ***Renal***  ERIC, renal following CVVHD started   Continue to monitor I/Os, BUN/Creatinine.   Replete lytes PRN  Va present      ***ID***  Empiric coverage with Vancomycin and Cefepime  Diflucan for prophiolaxia   Plan to CT to rule out infection     ***Endocrine***  [x]  DM2: HbA1c 7.3%                - [x] Insulin gtt              - Need tight glycemic control to prevent wound infection.          Patient requires continuous monitoring with:  bedside rhythm monitoring, arterial line, pulse oximetry, ventilator management and monitoring; intermittent blood gas analysis.  Care plan discussed with ICU care team.  patient remain critical; required more than usual post op care; I have spent 35 minutes providing non routine post op care, revaluated multiple times during the day .     Care Plan discussed with the ICU care team. Patient remained critical, at risk for life threatening decompensation.     By signing my name below, I, Maria D'Amico, attest that this documentation has been prepared under the direction and in the presence of Yusef Millan MD.  Electronically signed: Maria D'Amico, Scribe, 22 @ 07:17    I, Monty Holbrook, personally performed the services described in this documentation. all medical record entries made by the scribe were at my direction and in my presence. I have reviewed the chart and agree that the record reflects my personal performance and is accurate and complete  Electronically signed: Yusef Millan MD. Patient seen and examined at the bedside.    Remained critically ill on continuous ICU monitoring.    OBJECTIVE:  Vital Signs Last 24 Hrs  T(C): 37.2 (11 Dec 2022 04:00), Max: 37.5 (10 Dec 2022 12:00)  T(F): 99 (11 Dec 2022 04:00), Max: 99.5 (10 Dec 2022 12:00)  HR: 80 (11 Dec 2022 06:15) (73 - 124)  BP: --  BP(mean): --  RR: 23 (11 Dec 2022 06:15) (2 - 30)  SpO2: 98% (11 Dec 2022 06:15) (91% - 100%)    Parameters below as of 11 Dec 2022 04:00  Patient On (Oxygen Delivery Method): ventilator    O2 Concentration (%): 40      Physical Exam:   General: Intubated, multiple lines gtt  Neurology: on/off sedation  Eyes: bilateral pupils equal and reactive   ENT/Neck: +ETT midline, Neck supple, trachea midline, No JVD   Respiratory: rhonchi bilaterally   CV: S1S2, no murmurs        [x] Sinus rhythm  Abdominal: Softly distended,+BS   Extremities: 1+ pedal edema noted, + peripheral pulses palpable, warm  Skin: No Rashes, Hematoma, Ecchymosis                         ============================I/O===========================   I&O's Detail    10 Dec 2022 07:01  -  11 Dec 2022 07:00  --------------------------------------------------------  IN:    Amiodarone: 400.8 mL    Argatroban: 21 mL    Argatroban: 5.1 mL    Dexmedetomidine: 788.6 mL    DOBUTamine: 14 mL    Glucerna: 580 mL    Insulin: 89 mL    Milrinone: 134.4 mL    Norepinephrine: 82.7 mL    sodium chloride 0.9%: 150 mL    Vasopressin: 295 mL  Total IN: 2560.6 mL    OUT:    Indwelling Catheter - Urethral (mL): 190 mL    Other (mL): 2241 mL  Total OUT: 2431 mL    Total NET: 129.6 mL        ============================ LABS =========================                        8.9    18.09 )-----------( 129      ( 11 Dec 2022 00:25 )             26.7     12-    133<L>  |  100  |  31<H>  ----------------------------<  175<H>  4.4   |  19<L>  |  1.84<H>    Ca    8.5      11 Dec 2022 00:26  Phos  4.0     12  Mg     2.2     12    TPro  6.0  /  Alb  2.8<L>  /  TBili  1.5<H>  /  DBili  x   /  AST  50<H>  /  ALT  15  /  AlkPhos  146<H>  12-11    LIVER FUNCTIONS - ( 11 Dec 2022 00:26 )  Alb: 2.8 g/dL / Pro: 6.0 g/dL / ALK PHOS: 146 U/L / ALT: 15 U/L / AST: 50 U/L / GGT: x           PT/INR - ( 10 Dec 2022 10:52 )   PT: 19.0 sec;   INR: 1.64 ratio         PTT - ( 11 Dec 2022 00:25 )  PTT:38.6 sec  ABG - ( 11 Dec 2022 06:00 )  pH, Arterial: 7.47  pH, Blood: x     /  pCO2: 30    /  pO2: 92    / HCO3: 22    / Base Excess: -1.4  /  SaO2: 98.7              Urinalysis Basic - ( 10 Dec 2022 20:01 )    Color: Brown / Appearance: Slightly Turbid / S.020 / pH: x  Gluc: x / Ketone: Trace  / Bili: Moderate / Urobili: Negative   Blood: x / Protein: 300 mg/dL / Nitrite: Positive   Leuk Esterase: Large / RBC: >50 /hpf / WBC >50 /HPF   Sq Epi: x / Non Sq Epi: 3 / Bacteria: Moderate      ======================Micro/Rad/Cardio=================  Culture: Reviewed   CXR: Reviewed  Echo: Reviewed  ======================================================  PAST MEDICAL & SURGICAL HISTORY:      ======================================================    Assessment:  63 y/o male with PMH of CABG, DM, HTN, HLD presenting as transfer from [place] for c/f STEMI. PTA arrival received 325 aspirin, 600 plavix, and no heparin drip started. Around 1:30 am patient had multiple episodes of non-bloody diarrhea and emesis with associated abdominal pain and chest pain, unable to localize, non-radiating, with associated SOB and no associated palpitations or diaphoresis. No recent fevers/chills, cough, rashes, or changes in urination. Does report mild diffuse back pain; started 5 days ago in setting on injury while walking and lifting objects. Denies focal weakness, numbness/tingling, or LOC due does report mild dizziness.    acute respiratory distress/failure, intubated ,   cardiogenic shock s/p [facility] ECMO decannulated   CAD/ASHD/CABG, acute MI  cardiogenic shock  Hemodynamic instability  acute renal failure  encounter management IABP  encounter weaning ventilator  Leukocytosis   Thrombocytopenia  Anemia  acute pancreatitis    ======================================================  Plan:   ***Neuro***  CT head showed 4mm subdural hematoma : repeat CT head unchanged ,  [x] Sedated with [x] Precedex for vent synchrony  Post operative neuro assessment   Soft palate laceration, ENT scoped , stable, no acute intervention at this time      ***Cardiovascular***   TTE turndown EF 30-40%, normal RV, MR mod    GRZEGORZ EF 40-45%, MR, TR, normal RV function  Invasive hemodynamic monitoring, assess perfusion indices   SR / CVP 21/ MAP 60/Hct 26.7/ Lactate 1.3  [x] Primacor- 0.2 -> 0.1 mcgs/kg/min Levophed 0.017-> 0.01 mcg [x] Vasopressin 0.1 U/Min   [x]  cath patent LIMA graft and other grafts occluded, femoral IABP placed, with good augmentation @ 1:1,  [x]  ECMO d/c'd  PO Amiodarone for rate control  Continuos reassessment of hemodynamics    Aflutter s/p DCCV x2  [x] AC Therapy with Argatroban gtt pAF/flutter transitioned to Heparin gtt  [x] Statin    HIT NG,  MARY Neg      ***Pulmonary***  Post op vent management   Titration of FiO2 and PEEP, follow SpO2, CXR, blood gasses   CPAP --> Held off on extubating   Plan for extubation today/ tomorrow     Mode: AC/ CMV (Assist Control/ Continuous Mandatory Ventilation)  RR (machine): 14  FiO2: 40  PEEP: 5  ITime: 1  MAP: 11  PC: 18  PIP: 24              ***GI***  [x] NPO with TF's Glucerna 1.5 @ 0cc/hr with goal of 60cc/hr   [x] Protonix    Bowel regimen with Miralax and senna      ***Renal***  ERIC, renal following CVVHD started   Continue to monitor I/Os, BUN/Creatinine.   Replete lytes PRN  De Dios present      ***ID***  Empiric coverage with Vancomycin and Cefepime  Diflucan for prophiolaxia   Plan to CT to rule out infection     ***Endocrine***  [x]  DM2: HbA1c 7.3%                - [x] Insulin gtt              - Need tight glycemic control to prevent wound infection.          Patient requires continuous monitoring with:  bedside rhythm monitoring, arterial line, pulse oximetry, ventilator management and monitoring; intermittent blood gas analysis.  Care plan discussed with ICU care team.  patient remain critical; required more than usual post op care; I have spent 35 minutes providing non routine post op care, revaluated multiple times during the day .     Care Plan discussed with the ICU care team. Patient remained critical, at risk for life threatening decompensation.     By signing my name below, I, Maria D'Amico, attest that this documentation has been prepared under the direction and in the presence of Yusef Millan MD.  Electronically signed: Maria D'Amico, Scribe, 22 @ 07:17    I, Monty Holbrook, personally performed the services described in this documentation. all medical record entries made by the scribe were at my direction and in my presence. I have reviewed the chart and agree that the record reflects my personal performance and is accurate and complete  Electronically signed: Yusef Millan MD. Patient seen and examined at the bedside.    Remained critically ill on continuous ICU monitoring.    OBJECTIVE:  Vital Signs Last 24 Hrs  T(C): 37.2 (11 Dec 2022 04:00), Max: 37.5 (10 Dec 2022 12:00)  T(F): 99 (11 Dec 2022 04:00), Max: 99.5 (10 Dec 2022 12:00)  HR: 80 (11 Dec 2022 06:15) (73 - 124)  BP: --  BP(mean): --  RR: 23 (11 Dec 2022 06:15) (2 - 30)  SpO2: 98% (11 Dec 2022 06:15) (91% - 100%)    Parameters below as of 11 Dec 2022 04:00  Patient On (Oxygen Delivery Method): ventilator    O2 Concentration (%): 40      Physical Exam:   General: Intubated, multiple lines gtt  Neurology: on/off sedation  Eyes: bilateral pupils equal and reactive   ENT/Neck: +ETT midline, Neck supple, trachea midline, No JVD   Respiratory: rhonchi bilaterally   CV: S1S2, no murmurs        [x] Sinus rhythm  Abdominal: Softly distended,+BS   Extremities: 1+ pedal edema noted, + peripheral pulses palpable, warm  Skin: No Rashes, Hematoma, Ecchymosis                         ============================I/O===========================   I&O's Detail    10 Dec 2022 07:01  -  11 Dec 2022 07:00  --------------------------------------------------------  IN:    Amiodarone: 400.8 mL    Argatroban: 21 mL    Argatroban: 5.1 mL    Dexmedetomidine: 788.6 mL    DOBUTamine: 14 mL    Glucerna: 580 mL    Insulin: 89 mL    Milrinone: 134.4 mL    Norepinephrine: 82.7 mL    sodium chloride 0.9%: 150 mL    Vasopressin: 295 mL  Total IN: 2560.6 mL    OUT:    Indwelling Catheter - Urethral (mL): 190 mL    Other (mL): 2241 mL  Total OUT: 2431 mL    Total NET: 129.6 mL        ============================ LABS =========================                        8.9    18.09 )-----------( 129      ( 11 Dec 2022 00:25 )             26.7     12-    133<L>  |  100  |  31<H>  ----------------------------<  175<H>  4.4   |  19<L>  |  1.84<H>    Ca    8.5      11 Dec 2022 00:26  Phos  4.0     12  Mg     2.2     12    TPro  6.0  /  Alb  2.8<L>  /  TBili  1.5<H>  /  DBili  x   /  AST  50<H>  /  ALT  15  /  AlkPhos  146<H>  12-11    LIVER FUNCTIONS - ( 11 Dec 2022 00:26 )  Alb: 2.8 g/dL / Pro: 6.0 g/dL / ALK PHOS: 146 U/L / ALT: 15 U/L / AST: 50 U/L / GGT: x           PT/INR - ( 10 Dec 2022 10:52 )   PT: 19.0 sec;   INR: 1.64 ratio         PTT - ( 11 Dec 2022 00:25 )  PTT:38.6 sec  ABG - ( 11 Dec 2022 06:00 )  pH, Arterial: 7.47  pH, Blood: x     /  pCO2: 30    /  pO2: 92    / HCO3: 22    / Base Excess: -1.4  /  SaO2: 98.7              Urinalysis Basic - ( 10 Dec 2022 20:01 )    Color: Brown / Appearance: Slightly Turbid / S.020 / pH: x  Gluc: x / Ketone: Trace  / Bili: Moderate / Urobili: Negative   Blood: x / Protein: 300 mg/dL / Nitrite: Positive   Leuk Esterase: Large / RBC: >50 /hpf / WBC >50 /HPF   Sq Epi: x / Non Sq Epi: 3 / Bacteria: Moderate      ======================Micro/Rad/Cardio=================  Culture: Reviewed   CXR: Reviewed  Echo: Reviewed  ======================================================  PAST MEDICAL & SURGICAL HISTORY:      ======================================================    Assessment:  63 y/o male with PMH of CABG, DM, HTN, HLD presenting as transfer from [place] for c/f STEMI. PTA arrival received 325 aspirin, 600 plavix, and no heparin drip started. Around 1:30 am patient had multiple episodes of non-bloody diarrhea and emesis with associated abdominal pain and chest pain, unable to localize, non-radiating, with associated SOB and no associated palpitations or diaphoresis. No recent fevers/chills, cough, rashes, or changes in urination. Does report mild diffuse back pain; started 5 days ago in setting on injury while walking and lifting objects. Denies focal weakness, numbness/tingling, or LOC due does report mild dizziness.    acute respiratory distress/failure, intubated ,   cardiogenic shock s/p VA ECMO decannulated   CAD/ASHD/CABG, acute MI  cardiogenic shock  Hemodynamic instability  acute renal failure  encounter management IABP  encounter weaning ventilator  Leukocytosis   Thrombocytopenia  Anemia  acute pancreatitis    ======================================================  Plan:   ***Neuro***  CT head showed 4mm subdural hematoma : repeat CT head unchanged ,  [x] Sedated with [x] Precedex for vent synchrony  Post operative neuro assessment   Soft palate laceration, ENT scoped , stable, no acute intervention at this time      ***Cardiovascular***   TTE turndown EF 30-40%, normal RV, MR mod    GRZEGORZ EF 40-45%, MR, TR, normal RV function  Invasive hemodynamic monitoring, assess perfusion indices   SR / CVP 21/ MAP 60/Hct 26.7/ Lactate 1.3  [x] Primacor- 0.2 -> 0.1 mcgs/kg/min Levophed 0.017-> 0.01 mcg [x] Vasopressin 0.1 U/Min   [x]  cath patent LIMA graft and other grafts occluded, femoral IABP placed, with good augmentation @ 1:1,  [x]  ECMO d/c'd  PO Amiodarone for rate control  Continuos reassessment of hemodynamics    Aflutter s/p DCCV x2  [x] AC Therapy with Argatroban gtt pAF/flutter transitioned to Heparin gtt  [x] Statin    HIT NG,  MARY Neg      ***Pulmonary***  Post op vent management   Titration of FiO2 and PEEP, follow SpO2, CXR, blood gasses   CPAP --> Held off on extubating   Plan for extubation today/ tomorrow     Mode: AC/ CMV (Assist Control/ Continuous Mandatory Ventilation)  RR (machine): 14  FiO2: 40  PEEP: 5  ITime: 1  MAP: 11  PC: 18  PIP: 24              ***GI***  [x] NPO with TF's Glucerna 1.5 @ 0cc/hr with goal of 60cc/hr   [x] Protonix    Bowel regimen with Miralax and senna      ***Renal***  ERIC, renal following CVVHD started   Continue to monitor I/Os, BUN/Creatinine.   Replete lytes PRN  De Dios present      ***ID***  Empiric coverage with Vancomycin and Cefepime  Diflucan for prophiolaxia   Plan to CT to rule out infection     ***Endocrine***  [x]  DM2: HbA1c 7.3%                - [x] Insulin gtt              - Need tight glycemic control to prevent wound infection.          Patient requires continuous monitoring with:  bedside rhythm monitoring, arterial line, pulse oximetry, ventilator management and monitoring; intermittent blood gas analysis.  Care plan discussed with ICU care team.  patient remain critical; required more than usual post op care; I have spent 35 minutes providing non routine post op care, revaluated multiple times during the day .     Care Plan discussed with the ICU care team. Patient remained critical, at risk for life threatening decompensation.     By signing my name below, I, Maria D'Amico, attest that this documentation has been prepared under the direction and in the presence of Yusef Millan MD.  Electronically signed: Maria D'Amico, Scribe, 22 @ 07:17    I, Monty Holbrook, personally performed the services described in this documentation. all medical record entries made by the scribe were at my direction and in my presence. I have reviewed the chart and agree that the record reflects my personal performance and is accurate and complete  Electronically signed: Yusef Millan MD. Patient seen and examined at the bedside.    Remained critically ill on continuous ICU monitoring.    OBJECTIVE:  Vital Signs Last 24 Hrs  T(C): 37.2 (11 Dec 2022 04:00), Max: 37.5 (10 Dec 2022 12:00)  T(F): 99 (11 Dec 2022 04:00), Max: 99.5 (10 Dec 2022 12:00)  HR: 80 (11 Dec 2022 06:15) (73 - 124)  BP: --  BP(mean): --  RR: 23 (11 Dec 2022 06:15) (2 - 30)  SpO2: 98% (11 Dec 2022 06:15) (91% - 100%)    Parameters below as of 11 Dec 2022 04:00  Patient On (Oxygen Delivery Method): ventilator    O2 Concentration (%): 40      Physical Exam:   General: Intubated, multiple lines gtt  Neurology: on/off sedation  Eyes: bilateral pupils equal and reactive   ENT/Neck: +ETT midline, Neck supple, trachea midline, No JVD   Respiratory: rhonchi bilaterally   CV: S1S2, no murmurs        [x] Sinus rhythm  Abdominal: Softly distended,+BS   Extremities: 1+ pedal edema noted, + peripheral pulses palpable, warm  Skin: No Rashes, Hematoma, Ecchymosis                         ============================I/O===========================   I&O's Detail    10 Dec 2022 07:01  -  11 Dec 2022 07:00  --------------------------------------------------------  IN:    Amiodarone: 400.8 mL    Argatroban: 21 mL    Argatroban: 5.1 mL    Dexmedetomidine: 788.6 mL    DOBUTamine: 14 mL    Glucerna: 580 mL    Insulin: 89 mL    Milrinone: 134.4 mL    Norepinephrine: 82.7 mL    sodium chloride 0.9%: 150 mL    Vasopressin: 295 mL  Total IN: 2560.6 mL    OUT:    Indwelling Catheter - Urethral (mL): 190 mL    Other (mL): 2241 mL  Total OUT: 2431 mL    Total NET: 129.6 mL        ============================ LABS =========================                        8.9    18.09 )-----------( 129      ( 11 Dec 2022 00:25 )             26.7     12-    133<L>  |  100  |  31<H>  ----------------------------<  175<H>  4.4   |  19<L>  |  1.84<H>    Ca    8.5      11 Dec 2022 00:26  Phos  4.0     12  Mg     2.2     12    TPro  6.0  /  Alb  2.8<L>  /  TBili  1.5<H>  /  DBili  x   /  AST  50<H>  /  ALT  15  /  AlkPhos  146<H>  12-11    LIVER FUNCTIONS - ( 11 Dec 2022 00:26 )  Alb: 2.8 g/dL / Pro: 6.0 g/dL / ALK PHOS: 146 U/L / ALT: 15 U/L / AST: 50 U/L / GGT: x           PT/INR - ( 10 Dec 2022 10:52 )   PT: 19.0 sec;   INR: 1.64 ratio         PTT - ( 11 Dec 2022 00:25 )  PTT:38.6 sec  ABG - ( 11 Dec 2022 06:00 )  pH, Arterial: 7.47  pH, Blood: x     /  pCO2: 30    /  pO2: 92    / HCO3: 22    / Base Excess: -1.4  /  SaO2: 98.7              Urinalysis Basic - ( 10 Dec 2022 20:01 )    Color: Brown / Appearance: Slightly Turbid / S.020 / pH: x  Gluc: x / Ketone: Trace  / Bili: Moderate / Urobili: Negative   Blood: x / Protein: 300 mg/dL / Nitrite: Positive   Leuk Esterase: Large / RBC: >50 /hpf / WBC >50 /HPF   Sq Epi: x / Non Sq Epi: 3 / Bacteria: Moderate      ======================Micro/Rad/Cardio=================  Culture: Reviewed   CXR: Reviewed  Echo: Reviewed  ======================================================  PAST MEDICAL & SURGICAL HISTORY:      ======================================================    Assessment:  63 y/o male with PMH of CABG, DM, HTN, HLD presenting as transfer from [place] for c/f STEMI. PTA arrival received 325 aspirin, 600 plavix, and no heparin drip started. Around 1:30 am patient had multiple episodes of non-bloody diarrhea and emesis with associated abdominal pain and chest pain, unable to localize, non-radiating, with associated SOB and no associated palpitations or diaphoresis. No recent fevers/chills, cough, rashes, or changes in urination. Does report mild diffuse back pain; started 5 days ago in setting on injury while walking and lifting objects. Denies focal weakness, numbness/tingling, or LOC due does report mild dizziness.    acute respiratory distress/failure, intubated ,   cardiogenic shock s/p VA ECMO decannulated   CAD/ASHD/CABG, acute MI  cardiogenic shock  Hemodynamic instability  acute renal failure  encounter management IABP  encounter weaning ventilator  Leukocytosis   Thrombocytopenia  Anemia  acute pancreatitis    ======================================================  Plan:   ***Neuro***  CT head showed 4mm subdural hematoma : repeat CT head unchanged ,  [x] Sedated with [x] Precedex for vent synchrony  Post operative neuro assessment   Soft palate laceration, ENT scoped , stable, no acute intervention at this time      ***Cardiovascular***   TTE turndown EF 30-40%, normal RV, MR mod    GRZEGORZ EF 40-45%, MR, TR, normal RV function  Invasive hemodynamic monitoring, assess perfusion indices   SR / CVP 21/ MAP 60/Hct 26.7/ Lactate 1.3  [x] Primacor- 0.2 -> 0.1 mcgs/kg/min Levophed 0.017-> 0.01 mcg [x] Vasopressin 0.1 U/Min   [x]  cath patent LIMA graft and other grafts occluded, femoral IABP placed, with good augmentation @ 1:1,  [x]  ECMO d/c'd  PO Amiodarone for rate control  Continuos reassessment of hemodynamics    Aflutter s/p DCCV x2  [x] AC Therapy with Argatroban gtt pAF/flutter transitioned to Heparin gtt  [x] Statin    HIT NG,  MARY Neg      ***Pulmonary***  Post op vent management   Titration of FiO2 and PEEP, follow SpO2, CXR, blood gasses   CPAP --> Held off on extubating   Plan for extubation today/ tomorrow     Mode: AC/ CMV (Assist Control/ Continuous Mandatory Ventilation)  RR (machine): 14  FiO2: 40  PEEP: 5  ITime: 1  MAP: 11  PC: 18  PIP: 24              ***GI***  [x] NPO with TF's Glucerna 1.5 @ 0cc/hr with goal of 60cc/hr   [x] Protonix    Bowel regimen with Miralax and senna      ***Renal***  ERIC, renal following CVVHD started   Continue to monitor I/Os, BUN/Creatinine.   Replete lytes PRN  De Dios present      ***ID***  Empiric coverage with Vancomycin and Cefepime  Diflucan for prophiolaxia   Plan to CT to rule out infection     ***Endocrine***  [x]  DM2: HbA1c 7.3%                - [x] Insulin gtt              - Need tight glycemic control to prevent wound infection.          Patient requires continuous monitoring with:  bedside rhythm monitoring, arterial line, pulse oximetry, ventilator management and monitoring; intermittent blood gas analysis.  Care plan discussed with ICU care team.  patient remain critical; required more than usual post op care; I have spent 35 minutes providing non routine post op care, revaluated multiple times during the day .     Care Plan discussed with the ICU care team. Patient remained critical, at risk for life threatening decompensation.     By signing my name below, I, Maria D'Amico, attest that this documentation has been prepared under the direction and in the presence of Yusef Millan MD.  Electronically signed: Maria D'Amico, Scribe, 22 @ 07:17    I, Monty Holbrook, personally performed the services described in this documentation. all medical record entries made by the scribe were at my direction and in my presence. I have reviewed the chart and agree that the record reflects my personal performance and is accurate and complete  Electronically signed: Yusef Mlilan MD.

## 2022-12-11 NOTE — PROGRESS NOTE ADULT - SUBJECTIVE AND OBJECTIVE BOX
[facility] DIVISION OF KIDNEY DISEASES AND HYPERTENSION -- FOLLOW UP NOTE  --------------------------------------------------------------------------------  Chief Complaint:    24 hour events/subjective:  UOP: 190 mL   tolerating CRRT, Filter again clotted  Patient had CT scan performed       PAST HISTORY  --------------------------------------------------------------------------------  No significant changes to PMH, PSH, FHx, SHx, unless otherwise noted    ALLERGIES & MEDICATIONS  --------------------------------------------------------------------------------  Allergies    No Known Allergies    Intolerances      Standing Inpatient Medications  aMIOdarone    Tablet   Oral   aMIOdarone    Tablet 400 milliGRAM(s) Oral every 8 hours  aMIOdarone Infusion 0.5 mG/Min IV Continuous <Continuous>  atorvastatin 40 milliGRAM(s) Oral at bedtime  cefepime   IVPB 1000 milliGRAM(s) IV Intermittent every 8 hours  chlorhexidine 0.12% Liquid 15 milliLiter(s) Oral Mucosa every 12 hours  chlorhexidine 2% Cloths 1 Application(s) Topical <User Schedule>  CRRT Treatment    <Continuous>  dexMEDEtomidine Infusion 1 MICROgram(s)/kG/Hr IV Continuous <Continuous>  fluconAZOLE IVPB      heparin  Infusion 800 Unit(s)/Hr IV Continuous <Continuous>  insulin regular Infusion 3 Unit(s)/Hr IV Continuous <Continuous>  milrinone Infusion 0.1 MICROgram(s)/kG/Min IV Continuous <Continuous>  norepinephrine Infusion 0.05 MICROgram(s)/kG/Min IV Continuous <Continuous>  pantoprazole  Injectable 40 milliGRAM(s) IV Push two times a day  Phoxillum Filtration BK 4 / 2.5 5000 milliLiter(s) CRRT <Continuous>  polyethylene glycol 3350 17 Gram(s) Oral two times a day  PrismaSATE Dialysate BK 0 / 3.5 5000 milliLiter(s) CRRT <Continuous>  PrismaSOL Filtration BGK 4 / 2.5 5000 milliLiter(s) CRRT <Continuous>  senna 2 Tablet(s) Oral at bedtime  sodium chloride 0.9%. 1000 milliLiter(s) IV Continuous <Continuous>  vancomycin  IVPB 1000 milliGRAM(s) IV Intermittent every 12 hours  vasopressin Infusion 0.033 Unit(s)/Min IV Continuous <Continuous>    PRN Inpatient Medications      REVIEW OF SYSTEMS  --------------------------------------------------------------------------------   Unable to assess   Patient     VITALS/PHYSICAL EXAM  --------------------------------------------------------------------------------  T(C): 36.9 (12-11-22 @ 08:00), Max: 37.5 (12-10-22 @ 12:00)  HR: 83 (12-11-22 @ 11:15) (73 - 122)  BP: --  RR: 22 (12-11-22 @ 11:15) (2 - 30)  SpO2: 98% (12-11-22 @ 11:15) (91% - 100%)  Wt(kg): --        12-10-22 @ 07:01  -  12-11-22 @ 07:00  --------------------------------------------------------  IN: 2563.6 mL / OUT: 2602 mL / NET: -38.4 mL    12-11-22 @ 07:01  -  12-11-22 @ 11:29  --------------------------------------------------------  IN: 511.6 mL / OUT: 229 mL / NET: 282.6 mL        Physical Exam:  	Gen: Appears, ILL   	HEENT: Orally intubated   	Pulm: Mechanically ventilated   	CV: S1, S2  	Abd: Soft, +BS   	Ext: No LE edema B/L  	Neuro: Sedated   	Skin: Warm and dry  	Vascular access: Right non tunneled Catheter       LABS/STUDIES  --------------------------------------------------------------------------------              8.9    18.09 >-----------<  129      [12-11-22 @ 00:25]              26.7     133  |  100  |  31  ----------------------------<  175      [12-11-22 @ 00:26]  4.4   |  19  |  1.84        Ca     8.5     [12-11-22 @ 00:26]      Mg     2.2     [12-11-22 @ 00:26]      Phos  4.0     [12-11-22 @ 00:26]    TPro  6.0  /  Alb  2.8  /  TBili  1.5  /  DBili  x   /  AST  50  /  ALT  15  /  AlkPhos  146  [12-11-22 @ 00:26]    PT/INR: PT 19.0 , INR 1.64       [12-10-22 @ 10:52]  PTT: 38.6       [12-11-22 @ 00:25]      Creatinine Trend:  SCr 1.84 [12-11 @ 00:26]  SCr 2.19 [12-10 @ 00:47]  SCr 2.29 [12-09 @ 12:23]  SCr 3.10 [12-09 @ 00:19]  SCr 1.89 [12-08 @ 10:58]    Urinalysis - [12-10-22 @ 20:01]      Color Brown / Appearance Slightly Turbid / SG 1.020 / pH 5.0      Gluc 100 mg/dL / Ketone Trace  / Bili Moderate / Urobili Negative       Blood Large / Protein 300 mg/dL / Leuk Est Large / Nitrite Positive      RBC >50 / WBC >50 / Hyaline  / Gran 10 / Sq Epi  / Non Sq Epi 3 / Bacteria Moderate      TSH 2.84      [12-10-22 @ 15:55]  Lipid: chol --, , HDL --, LDL --      [12-05-22 @ 00:50]       [facility] DIVISION OF KIDNEY DISEASES AND HYPERTENSION -- FOLLOW UP NOTE  --------------------------------------------------------------------------------  Chief Complaint:    24 hour events/subjective:  UOP: 190 mL   tolerating CRRT, Filter again clotted  Patient had CT scan performed       PAST HISTORY  --------------------------------------------------------------------------------  No significant changes to PMH, PSH, FHx, SHx, unless otherwise noted    ALLERGIES & MEDICATIONS  --------------------------------------------------------------------------------  Allergies    No Known Allergies    Intolerances      Standing Inpatient Medications  aMIOdarone    Tablet   Oral   aMIOdarone    Tablet 400 milliGRAM(s) Oral every 8 hours  aMIOdarone Infusion 0.5 mG/Min IV Continuous <Continuous>  atorvastatin 40 milliGRAM(s) Oral at bedtime  cefepime   IVPB 1000 milliGRAM(s) IV Intermittent every 8 hours  chlorhexidine 0.12% Liquid 15 milliLiter(s) Oral Mucosa every 12 hours  chlorhexidine 2% Cloths 1 Application(s) Topical <User Schedule>  CRRT Treatment    <Continuous>  dexMEDEtomidine Infusion 1 MICROgram(s)/kG/Hr IV Continuous <Continuous>  fluconAZOLE IVPB      heparin  Infusion 800 Unit(s)/Hr IV Continuous <Continuous>  insulin regular Infusion 3 Unit(s)/Hr IV Continuous <Continuous>  milrinone Infusion 0.1 MICROgram(s)/kG/Min IV Continuous <Continuous>  norepinephrine Infusion 0.05 MICROgram(s)/kG/Min IV Continuous <Continuous>  pantoprazole  Injectable 40 milliGRAM(s) IV Push two times a day  Phoxillum Filtration BK 4 / 2.5 5000 milliLiter(s) CRRT <Continuous>  polyethylene glycol 3350 17 Gram(s) Oral two times a day  PrismaSATE Dialysate BK 0 / 3.5 5000 milliLiter(s) CRRT <Continuous>  PrismaSOL Filtration BGK 4 / 2.5 5000 milliLiter(s) CRRT <Continuous>  senna 2 Tablet(s) Oral at bedtime  sodium chloride 0.9%. 1000 milliLiter(s) IV Continuous <Continuous>  vancomycin  IVPB 1000 milliGRAM(s) IV Intermittent every 12 hours  vasopressin Infusion 0.033 Unit(s)/Min IV Continuous <Continuous>    PRN Inpatient Medications      REVIEW OF SYSTEMS  --------------------------------------------------------------------------------   Unable to assess   Patient     VITALS/PHYSICAL EXAM  --------------------------------------------------------------------------------  T(C): 36.9 (12-11-22 @ 08:00), Max: 37.5 (12-10-22 @ 12:00)  HR: 83 (12-11-22 @ 11:15) (73 - 122)  BP: --  RR: 22 (12-11-22 @ 11:15) (2 - 30)  SpO2: 98% (12-11-22 @ 11:15) (91% - 100%)  Wt(kg): --        12-10-22 @ 07:01  -  12-11-22 @ 07:00  --------------------------------------------------------  IN: 2563.6 mL / OUT: 2602 mL / NET: -38.4 mL    12-11-22 @ 07:01  -  12-11-22 @ 11:29  --------------------------------------------------------  IN: 511.6 mL / OUT: 229 mL / NET: 282.6 mL        Physical Exam:  	Gen: Appears, ILL   	HEENT: Orally intubated   	Pulm: Mechanically ventilated   	CV: S1, S2  	Abd: Soft, +BS   	Ext: No LE edema B/L  	Neuro: Sedated   	Skin: Warm and dry  	Vascular access: Right non tunneled Catheter       LABS/STUDIES  --------------------------------------------------------------------------------              8.9    18.09 >-----------<  129      [12-11-22 @ 00:25]              26.7     133  |  100  |  31  ----------------------------<  175      [12-11-22 @ 00:26]  4.4   |  19  |  1.84        Ca     8.5     [12-11-22 @ 00:26]      Mg     2.2     [12-11-22 @ 00:26]      Phos  4.0     [12-11-22 @ 00:26]    TPro  6.0  /  Alb  2.8  /  TBili  1.5  /  DBili  x   /  AST  50  /  ALT  15  /  AlkPhos  146  [12-11-22 @ 00:26]    PT/INR: PT 19.0 , INR 1.64       [12-10-22 @ 10:52]  PTT: 38.6       [12-11-22 @ 00:25]      Creatinine Trend:  SCr 1.84 [12-11 @ 00:26]  SCr 2.19 [12-10 @ 00:47]  SCr 2.29 [12-09 @ 12:23]  SCr 3.10 [12-09 @ 00:19]  SCr 1.89 [12-08 @ 10:58]    Urinalysis - [12-10-22 @ 20:01]      Color Brown / Appearance Slightly Turbid / SG 1.020 / pH 5.0      Gluc 100 mg/dL / Ketone Trace  / Bili Moderate / Urobili Negative       Blood Large / Protein 300 mg/dL / Leuk Est Large / Nitrite Positive      RBC >50 / WBC >50 / Hyaline  / Gran 10 / Sq Epi  / Non Sq Epi 3 / Bacteria Moderate      TSH 2.84      [12-10-22 @ 15:55]  Lipid: chol --, , HDL --, LDL --      [12-05-22 @ 00:50]       [facility] DIVISION OF KIDNEY DISEASES AND HYPERTENSION -- FOLLOW UP NOTE  --------------------------------------------------------------------------------  Chief Complaint:    24 hour events/subjective:  UOP: 190 mL   tolerating CRRT, Filter again clotted  Patient had CT scan performed       PAST HISTORY  --------------------------------------------------------------------------------  No significant changes to PMH, PSH, FHx, SHx, unless otherwise noted    ALLERGIES & MEDICATIONS  --------------------------------------------------------------------------------  Allergies    No Known Allergies    Intolerances      Standing Inpatient Medications  aMIOdarone    Tablet   Oral   aMIOdarone    Tablet 400 milliGRAM(s) Oral every 8 hours  aMIOdarone Infusion 0.5 mG/Min IV Continuous <Continuous>  atorvastatin 40 milliGRAM(s) Oral at bedtime  cefepime   IVPB 1000 milliGRAM(s) IV Intermittent every 8 hours  chlorhexidine 0.12% Liquid 15 milliLiter(s) Oral Mucosa every 12 hours  chlorhexidine 2% Cloths 1 Application(s) Topical <User Schedule>  CRRT Treatment    <Continuous>  dexMEDEtomidine Infusion 1 MICROgram(s)/kG/Hr IV Continuous <Continuous>  fluconAZOLE IVPB      heparin  Infusion 800 Unit(s)/Hr IV Continuous <Continuous>  insulin regular Infusion 3 Unit(s)/Hr IV Continuous <Continuous>  milrinone Infusion 0.1 MICROgram(s)/kG/Min IV Continuous <Continuous>  norepinephrine Infusion 0.05 MICROgram(s)/kG/Min IV Continuous <Continuous>  pantoprazole  Injectable 40 milliGRAM(s) IV Push two times a day  Phoxillum Filtration BK 4 / 2.5 5000 milliLiter(s) CRRT <Continuous>  polyethylene glycol 3350 17 Gram(s) Oral two times a day  PrismaSATE Dialysate BK 0 / 3.5 5000 milliLiter(s) CRRT <Continuous>  PrismaSOL Filtration BGK 4 / 2.5 5000 milliLiter(s) CRRT <Continuous>  senna 2 Tablet(s) Oral at bedtime  sodium chloride 0.9%. 1000 milliLiter(s) IV Continuous <Continuous>  vancomycin  IVPB 1000 milliGRAM(s) IV Intermittent every 12 hours  vasopressin Infusion 0.033 Unit(s)/Min IV Continuous <Continuous>    PRN Inpatient Medications      REVIEW OF SYSTEMS  --------------------------------------------------------------------------------   Unable to assess   Patient     VITALS/PHYSICAL EXAM  --------------------------------------------------------------------------------  T(C): 36.9 (12-11-22 @ 08:00), Max: 37.5 (12-10-22 @ 12:00)  HR: 83 (12-11-22 @ 11:15) (73 - 122)  BP: --  RR: 22 (12-11-22 @ 11:15) (2 - 30)  SpO2: 98% (12-11-22 @ 11:15) (91% - 100%)  Wt(kg): --        12-10-22 @ 07:01  -  12-11-22 @ 07:00  --------------------------------------------------------  IN: 2563.6 mL / OUT: 2602 mL / NET: -38.4 mL    12-11-22 @ 07:01  -  12-11-22 @ 11:29  --------------------------------------------------------  IN: 511.6 mL / OUT: 229 mL / NET: 282.6 mL        Physical Exam:  	Gen: Appears, ILL   	HEENT: Orally intubated   	Pulm: Mechanically ventilated   	CV: S1, S2  	Abd: Soft, +BS   	Ext: No LE edema B/L  	Neuro: Sedated   	Skin: Warm and dry  	Vascular access: Right non tunneled Catheter       LABS/STUDIES  --------------------------------------------------------------------------------              8.9    18.09 >-----------<  129      [12-11-22 @ 00:25]              26.7     133  |  100  |  31  ----------------------------<  175      [12-11-22 @ 00:26]  4.4   |  19  |  1.84        Ca     8.5     [12-11-22 @ 00:26]      Mg     2.2     [12-11-22 @ 00:26]      Phos  4.0     [12-11-22 @ 00:26]    TPro  6.0  /  Alb  2.8  /  TBili  1.5  /  DBili  x   /  AST  50  /  ALT  15  /  AlkPhos  146  [12-11-22 @ 00:26]    PT/INR: PT 19.0 , INR 1.64       [12-10-22 @ 10:52]  PTT: 38.6       [12-11-22 @ 00:25]      Creatinine Trend:  SCr 1.84 [12-11 @ 00:26]  SCr 2.19 [12-10 @ 00:47]  SCr 2.29 [12-09 @ 12:23]  SCr 3.10 [12-09 @ 00:19]  SCr 1.89 [12-08 @ 10:58]    Urinalysis - [12-10-22 @ 20:01]      Color Brown / Appearance Slightly Turbid / SG 1.020 / pH 5.0      Gluc 100 mg/dL / Ketone Trace  / Bili Moderate / Urobili Negative       Blood Large / Protein 300 mg/dL / Leuk Est Large / Nitrite Positive      RBC >50 / WBC >50 / Hyaline  / Gran 10 / Sq Epi  / Non Sq Epi 3 / Bacteria Moderate      TSH 2.84      [12-10-22 @ 15:55]  Lipid: chol --, , HDL --, LDL --      [12-05-22 @ 00:50]

## 2022-12-11 NOTE — PROGRESS NOTE ADULT - SUBJECTIVE AND OBJECTIVE BOX
Patient is a 62y old  Male who presents with a chief complaint of Acute cholecystitis, gallstone pancreatitis (10 Dec 2022 13:24)    Interval history:  Leukocytosis, fever better, fluconazole and cefepime added yesterday.  ROS unable as he is sedated/intubated.    PMHx/PSHx:  CAD  CABG  DM  HTN  HLD    Allergies:   No Known Allergies    ANTIMICROBIALS:  cefepime   IVPB 1000 every 8 hours  fluconAZOLE IVPB    vancomycin  IVPB 1000 every 12 hours    MEDICATIONS  (STANDING):  aMIOdarone Infusion 0.5 <Continuous>  argatroban Infusion 0.3 <Continuous>  atorvastatin 40 at bedtime  dexMEDEtomidine Infusion 1 <Continuous>  insulin regular Infusion 3 <Continuous>  milrinone Infusion 0.2 <Continuous>  norepinephrine Infusion 0.05 <Continuous>  pantoprazole  Injectable 40 two times a day  polyethylene glycol 3350 17 two times a day  senna 2 at bedtime  vasopressin Infusion 0.033 <Continuous>    Vital Signs Last 24 Hrs  T(F): 98.4 (12-11-22 @ 08:00), Max: 99.5 (12-10-22 @ 12:00)  HR: 80 (12-11-22 @ 09:30)  RR: 18 (12-11-22 @ 09:30)  SpO2: 98% (12-11-22 @ 09:30) (91% - 100%)  Wt(kg): --    PHYSICAL EXAM:  Constitutional: intubated, sedated, pressors  HEAD/EYES: eyes closed  ENT:  orally intubated, R IJ okay  Cardiovascular:   normal S1, S2,  Respiratory:  clear BS bilaterally  GI:  soft, non-tender, normal bowel sounds  :  michel,  Musculoskeletal:  no synovitis  Neurologic: sedated, not able to assess  Skin:  no rash, L IABP  Psychiatric:  not able to assess                        8.9    18.09 )-----------( 129      ( 11 Dec 2022 00:25 )             26.7 12-11    133  |  100  |  31  ----------------------------<  175  4.4   |  19  |  1.84  Ca    8.5      11 Dec 2022 00:26Phos  4.0     12-11Mg     2.2     12-11  TPro  6.0  /  Alb  2.8  /  TBili  1.5  /  DBili  x   /  AST  50  /  ALT  15  /  AlkPhos  146  12-11      MICROBIOLOGY:  Culture - Sputum (collected 12-10-22 @ 10:49)  Source: ET Tube ET Tube  Gram Stain (12-10-22 @ 23:07):    Moderate polymorphonuclear leukocytes per low power field    No Squamous epithelial cells per low power field    Few Yeast like cells per oil power field    Culture - Sputum (collected 12-09-22 @ 04:58)  Source: ET Tube ET Tube  Gram Stain (12-09-22 @ 15:01):    Numerous polymorphonuclear leukocytes per low power field    Moderate Squamous epithelial cells per low power field    Few Yeast like cells    per oil power field  Preliminary Report (12-10-22 @ 10:20):    Normal Respiratory Christy present    Culture - Blood (collected 12-08-22 @ 23:45)  Source: .Blood Blood-Peripheral  Preliminary Report (12-10-22 @ 03:02):    No growth to date.    Culture - Blood (collected 12-08-22 @ 23:44)  Source: .Blood Blood-Peripheral  Preliminary Report (12-10-22 @ 03:02):    No growth to date.    RADIOLOGY:  Xray Chest 1 View- PORTABLE-Routine (12.10.22 @ 03:24) >  IMPRESSION:  Trace left pleural effusion with left basilar atelectasis.  Similar pulmonary congestion.  Lines and tubes as above.    CT Abdomen and Pelvis No Cont (12.06.22 @ 13:02) >  IMPRESSION:  Improving multifocal bilateral airspace disease. Small pleural effusions.  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

## 2022-12-11 NOTE — PROGRESS NOTE ADULT - PROBLEM SELECTOR PLAN 1
Pt with non oliguric ERIC/ ATN in the setting of STEMI, cardiac arrest & cardiogenic shock  SCr on arrival was 2.15; trended down to hector 1.6 on 11/25;  slowly trended up & increased to 3.95 11/28.     Pt received IV diuretics. SCr increased to 4.19 with BUN of 101 on 12/5/22. Pt with significant volume overload. Pt initiated on CRRT/CVVHDF to optimize volume and electrolytes on 12/5/22. BP being maintained on vasopressors. Pt likely with ATN. Pt underwent decannulation of ECMO on 12/8/22 and was taken off CRRT. Pt reinitiated on CRRT overnight on 12/9/22 for volume overload.   Plan is to continue CRRT. Tolerating well, Filter again clotted   Monitor labs and urine output (15 ml/ 24 hours) Now anuric . Avoid any potential nephrotoxins.   Dose medications as per eGFR.

## 2022-12-12 LAB
ALBUMIN SERPL ELPH-MCNC: 2.8 G/DL — LOW (ref 3.3–5)
ALP SERPL-CCNC: 134 U/L — HIGH (ref 40–120)
ALT FLD-CCNC: 18 U/L — SIGNIFICANT CHANGE UP (ref 10–45)
AMYLASE P1 CFR SERPL: 206 U/L — HIGH (ref 25–125)
ANION GAP SERPL CALC-SCNC: 15 MMOL/L — SIGNIFICANT CHANGE UP (ref 5–17)
APTT BLD: 30.7 SEC — SIGNIFICANT CHANGE UP (ref 27.5–35.5)
APTT BLD: 38.9 SEC — HIGH (ref 27.5–35.5)
APTT BLD: 43.5 SEC — HIGH (ref 27.5–35.5)
APTT BLD: 47.9 SEC — HIGH (ref 27.5–35.5)
AST SERPL-CCNC: 44 U/L — HIGH (ref 10–40)
BASE EXCESS BLDV CALC-SCNC: -1.4 MMOL/L — SIGNIFICANT CHANGE UP (ref -2–3)
BILIRUB SERPL-MCNC: 1.2 MG/DL — SIGNIFICANT CHANGE UP (ref 0.2–1.2)
BLOOD GAS VENOUS - CREATININE: SIGNIFICANT CHANGE UP MG/DL (ref 0.5–1.3)
BUN SERPL-MCNC: 25 MG/DL — HIGH (ref 7–23)
CA-I SERPL-SCNC: 1.17 MMOL/L — SIGNIFICANT CHANGE UP (ref 1.15–1.33)
CALCIUM SERPL-MCNC: 8.3 MG/DL — LOW (ref 8.4–10.5)
CHLORIDE BLDV-SCNC: 103 MMOL/L — SIGNIFICANT CHANGE UP (ref 96–108)
CHLORIDE SERPL-SCNC: 99 MMOL/L — SIGNIFICANT CHANGE UP (ref 96–108)
CO2 BLDV-SCNC: 23 MMOL/L — SIGNIFICANT CHANGE UP (ref 22–26)
CO2 SERPL-SCNC: 20 MMOL/L — LOW (ref 22–31)
CREAT SERPL-MCNC: 1.55 MG/DL — HIGH (ref 0.5–1.3)
CULTURE RESULTS: NO GROWTH — SIGNIFICANT CHANGE UP
CULTURE RESULTS: SIGNIFICANT CHANGE UP
EGFR: 50 ML/MIN/1.73M2 — LOW
FIBRINOGEN PPP-MCNC: 364 MG/DL — SIGNIFICANT CHANGE UP (ref 200–445)
GAS PNL BLDA: SIGNIFICANT CHANGE UP
GAS PNL BLDV: 130 MMOL/L — LOW (ref 136–145)
GAS PNL BLDV: SIGNIFICANT CHANGE UP
GLUCOSE BLDC GLUCOMTR-MCNC: 102 MG/DL — HIGH (ref 70–99)
GLUCOSE BLDC GLUCOMTR-MCNC: 113 MG/DL — HIGH (ref 70–99)
GLUCOSE BLDC GLUCOMTR-MCNC: 126 MG/DL — HIGH (ref 70–99)
GLUCOSE BLDC GLUCOMTR-MCNC: 127 MG/DL — HIGH (ref 70–99)
GLUCOSE BLDC GLUCOMTR-MCNC: 132 MG/DL — HIGH (ref 70–99)
GLUCOSE BLDC GLUCOMTR-MCNC: 136 MG/DL — HIGH (ref 70–99)
GLUCOSE BLDC GLUCOMTR-MCNC: 139 MG/DL — HIGH (ref 70–99)
GLUCOSE BLDC GLUCOMTR-MCNC: 140 MG/DL — HIGH (ref 70–99)
GLUCOSE BLDC GLUCOMTR-MCNC: 158 MG/DL — HIGH (ref 70–99)
GLUCOSE BLDC GLUCOMTR-MCNC: 162 MG/DL — HIGH (ref 70–99)
GLUCOSE BLDV-MCNC: 99 MG/DL — SIGNIFICANT CHANGE UP (ref 70–99)
GLUCOSE SERPL-MCNC: 100 MG/DL — HIGH (ref 70–99)
HCO3 BLDV-SCNC: 22 MMOL/L — SIGNIFICANT CHANGE UP (ref 22–29)
HCT VFR BLD CALC: 28.2 % — LOW (ref 39–50)
HCT VFR BLDA CALC: 28 % — LOW (ref 39–51)
HGB BLD CALC-MCNC: 9.2 G/DL — LOW (ref 12.6–17.4)
HGB BLD-MCNC: 9.2 G/DL — LOW (ref 13–17)
HOROWITZ INDEX BLDV+IHG-RTO: 40 — SIGNIFICANT CHANGE UP
INR BLD: 1.18 RATIO — HIGH (ref 0.88–1.16)
LACTATE BLDV-MCNC: 1.3 MMOL/L — SIGNIFICANT CHANGE UP (ref 0.5–2)
LIDOCAIN IGE QN: 404 U/L — HIGH (ref 7–60)
MAGNESIUM SERPL-MCNC: 2.1 MG/DL — SIGNIFICANT CHANGE UP (ref 1.6–2.6)
MCHC RBC-ENTMCNC: 29.1 PG — SIGNIFICANT CHANGE UP (ref 27–34)
MCHC RBC-ENTMCNC: 32.6 GM/DL — SIGNIFICANT CHANGE UP (ref 32–36)
MCV RBC AUTO: 89.2 FL — SIGNIFICANT CHANGE UP (ref 80–100)
NRBC # BLD: 5 /100 WBCS — HIGH (ref 0–0)
PCO2 BLDV: 33 MMHG — LOW (ref 42–55)
PH BLDV: 7.44 — HIGH (ref 7.32–7.43)
PHOSPHATE SERPL-MCNC: 3.4 MG/DL — SIGNIFICANT CHANGE UP (ref 2.5–4.5)
PLATELET # BLD AUTO: 142 K/UL — LOW (ref 150–400)
PO2 BLDV: 46 MMHG — HIGH (ref 25–45)
POTASSIUM BLDV-SCNC: 3.7 MMOL/L — SIGNIFICANT CHANGE UP (ref 3.5–5.1)
POTASSIUM SERPL-MCNC: 3.8 MMOL/L — SIGNIFICANT CHANGE UP (ref 3.5–5.3)
POTASSIUM SERPL-SCNC: 3.8 MMOL/L — SIGNIFICANT CHANGE UP (ref 3.5–5.3)
PROT SERPL-MCNC: 6.2 G/DL — SIGNIFICANT CHANGE UP (ref 6–8.3)
PROTHROM AB SERPL-ACNC: 13.7 SEC — HIGH (ref 10.5–13.4)
RBC # BLD: 3.16 M/UL — LOW (ref 4.2–5.8)
RBC # FLD: 15 % — HIGH (ref 10.3–14.5)
SAO2 % BLDV: 79.3 % — SIGNIFICANT CHANGE UP (ref 67–88)
SODIUM SERPL-SCNC: 134 MMOL/L — LOW (ref 135–145)
SPECIMEN SOURCE: SIGNIFICANT CHANGE UP
VANCOMYCIN TROUGH SERPL-MCNC: 16.4 UG/ML — SIGNIFICANT CHANGE UP (ref 10–20)
WBC # BLD: 18.07 K/UL — HIGH (ref 3.8–10.5)
WBC # FLD AUTO: 18.07 K/UL — HIGH (ref 3.8–10.5)

## 2022-12-12 PROCEDURE — 99233 SBSQ HOSP IP/OBS HIGH 50: CPT

## 2022-12-12 PROCEDURE — 99233 SBSQ HOSP IP/OBS HIGH 50: CPT | Mod: GC

## 2022-12-12 PROCEDURE — 95720 EEG PHY/QHP EA INCR W/VEEG: CPT

## 2022-12-12 PROCEDURE — 99291 CRITICAL CARE FIRST HOUR: CPT

## 2022-12-12 PROCEDURE — 71045 X-RAY EXAM CHEST 1 VIEW: CPT | Mod: 26

## 2022-12-12 PROCEDURE — 99232 SBSQ HOSP IP/OBS MODERATE 35: CPT

## 2022-12-12 PROCEDURE — 0042T: CPT

## 2022-12-12 PROCEDURE — 70498 CT ANGIOGRAPHY NECK: CPT | Mod: 26

## 2022-12-12 PROCEDURE — 70496 CT ANGIOGRAPHY HEAD: CPT | Mod: 26

## 2022-12-12 RX ORDER — VANCOMYCIN HCL 1 G
1000 VIAL (EA) INTRAVENOUS EVERY 24 HOURS
Refills: 0 | Status: DISCONTINUED | OUTPATIENT
Start: 2022-12-12 | End: 2022-12-15

## 2022-12-12 RX ORDER — ACETAMINOPHEN 500 MG
650 TABLET ORAL EVERY 6 HOURS
Refills: 0 | Status: DISCONTINUED | OUTPATIENT
Start: 2022-12-12 | End: 2022-12-16

## 2022-12-12 RX ORDER — MULTIVIT-MIN/FERROUS GLUCONATE 9 MG/15 ML
15 LIQUID (ML) ORAL DAILY
Refills: 0 | Status: DISCONTINUED | OUTPATIENT
Start: 2022-12-12 | End: 2022-12-16

## 2022-12-12 RX ORDER — MEROPENEM 1 G/30ML
1000 INJECTION INTRAVENOUS EVERY 12 HOURS
Refills: 0 | Status: DISCONTINUED | OUTPATIENT
Start: 2022-12-12 | End: 2022-12-16

## 2022-12-12 RX ORDER — AMIODARONE HYDROCHLORIDE 400 MG/1
0.5 TABLET ORAL
Qty: 900 | Refills: 0 | Status: DISCONTINUED | OUTPATIENT
Start: 2022-12-12 | End: 2022-12-12

## 2022-12-12 RX ORDER — PANTOPRAZOLE SODIUM 20 MG/1
40 TABLET, DELAYED RELEASE ORAL DAILY
Refills: 0 | Status: DISCONTINUED | OUTPATIENT
Start: 2022-12-12 | End: 2022-12-16

## 2022-12-12 RX ORDER — LEVETIRACETAM 250 MG/1
1000 TABLET, FILM COATED ORAL ONCE
Refills: 0 | Status: COMPLETED | OUTPATIENT
Start: 2022-12-12 | End: 2022-12-12

## 2022-12-12 RX ORDER — ACETAMINOPHEN 500 MG
1000 TABLET ORAL ONCE
Refills: 0 | Status: COMPLETED | OUTPATIENT
Start: 2022-12-12 | End: 2022-12-12

## 2022-12-12 RX ORDER — THIAMINE MONONITRATE (VIT B1) 100 MG
100 TABLET ORAL DAILY
Refills: 0 | Status: DISCONTINUED | OUTPATIENT
Start: 2022-12-12 | End: 2022-12-16

## 2022-12-12 RX ORDER — ONDANSETRON 8 MG/1
4 TABLET, FILM COATED ORAL ONCE
Refills: 0 | Status: COMPLETED | OUTPATIENT
Start: 2022-12-12 | End: 2022-12-12

## 2022-12-12 RX ORDER — INSULIN LISPRO 100/ML
VIAL (ML) SUBCUTANEOUS EVERY 4 HOURS
Refills: 0 | Status: DISCONTINUED | OUTPATIENT
Start: 2022-12-12 | End: 2022-12-16

## 2022-12-12 RX ORDER — DEXMEDETOMIDINE HYDROCHLORIDE IN 0.9% SODIUM CHLORIDE 4 UG/ML
0.3 INJECTION INTRAVENOUS
Qty: 200 | Refills: 0 | Status: DISCONTINUED | OUTPATIENT
Start: 2022-12-12 | End: 2022-12-16

## 2022-12-12 RX ADMIN — Medication 1: at 10:13

## 2022-12-12 RX ADMIN — CHLORHEXIDINE GLUCONATE 1 APPLICATION(S): 213 SOLUTION TOPICAL at 04:13

## 2022-12-12 RX ADMIN — AMIODARONE HYDROCHLORIDE 16.7 MG/MIN: 400 TABLET ORAL at 10:10

## 2022-12-12 RX ADMIN — PANTOPRAZOLE SODIUM 40 MILLIGRAM(S): 20 TABLET, DELAYED RELEASE ORAL at 13:38

## 2022-12-12 RX ADMIN — Medication 1000 MILLIGRAM(S): at 02:45

## 2022-12-12 RX ADMIN — MILRINONE LACTATE 2.79 MICROGRAM(S)/KG/MIN: 1 INJECTION, SOLUTION INTRAVENOUS at 10:10

## 2022-12-12 RX ADMIN — CHLORHEXIDINE GLUCONATE 15 MILLILITER(S): 213 SOLUTION TOPICAL at 18:17

## 2022-12-12 RX ADMIN — HEPARIN SODIUM 12 UNIT(S)/HR: 5000 INJECTION INTRAVENOUS; SUBCUTANEOUS at 10:09

## 2022-12-12 RX ADMIN — HEPARIN SODIUM 13.5 UNIT(S)/HR: 5000 INJECTION INTRAVENOUS; SUBCUTANEOUS at 14:49

## 2022-12-12 RX ADMIN — Medication 650 MILLIGRAM(S): at 14:37

## 2022-12-12 RX ADMIN — PANTOPRAZOLE SODIUM 40 MILLIGRAM(S): 20 TABLET, DELAYED RELEASE ORAL at 05:26

## 2022-12-12 RX ADMIN — MEROPENEM 100 MILLIGRAM(S): 1 INJECTION INTRAVENOUS at 18:17

## 2022-12-12 RX ADMIN — MEROPENEM 100 MILLIGRAM(S): 1 INJECTION INTRAVENOUS at 05:35

## 2022-12-12 RX ADMIN — Medication 1: at 14:18

## 2022-12-12 RX ADMIN — LEVETIRACETAM 400 MILLIGRAM(S): 250 TABLET, FILM COATED ORAL at 06:03

## 2022-12-12 RX ADMIN — Medication 650 MILLIGRAM(S): at 13:37

## 2022-12-12 RX ADMIN — Medication 250 MILLIGRAM(S): at 07:25

## 2022-12-12 RX ADMIN — DEXMEDETOMIDINE HYDROCHLORIDE IN 0.9% SODIUM CHLORIDE 6.97 MICROGRAM(S)/KG/HR: 4 INJECTION INTRAVENOUS at 19:41

## 2022-12-12 RX ADMIN — DEXMEDETOMIDINE HYDROCHLORIDE IN 0.9% SODIUM CHLORIDE 6.97 MICROGRAM(S)/KG/HR: 4 INJECTION INTRAVENOUS at 13:38

## 2022-12-12 RX ADMIN — AMIODARONE HYDROCHLORIDE 400 MILLIGRAM(S): 400 TABLET ORAL at 10:13

## 2022-12-12 RX ADMIN — HEPARIN SODIUM 13.5 UNIT(S)/HR: 5000 INJECTION INTRAVENOUS; SUBCUTANEOUS at 19:42

## 2022-12-12 RX ADMIN — CHLORHEXIDINE GLUCONATE 15 MILLILITER(S): 213 SOLUTION TOPICAL at 05:10

## 2022-12-12 RX ADMIN — SENNA PLUS 2 TABLET(S): 8.6 TABLET ORAL at 21:19

## 2022-12-12 RX ADMIN — ONDANSETRON 4 MILLIGRAM(S): 8 TABLET, FILM COATED ORAL at 13:05

## 2022-12-12 RX ADMIN — SODIUM CHLORIDE 5 MILLILITER(S): 9 INJECTION INTRAMUSCULAR; INTRAVENOUS; SUBCUTANEOUS at 10:38

## 2022-12-12 RX ADMIN — AMIODARONE HYDROCHLORIDE 400 MILLIGRAM(S): 400 TABLET ORAL at 18:16

## 2022-12-12 RX ADMIN — Medication 400 MILLIGRAM(S): at 02:30

## 2022-12-12 RX ADMIN — POLYETHYLENE GLYCOL 3350 17 GRAM(S): 17 POWDER, FOR SOLUTION ORAL at 05:26

## 2022-12-12 RX ADMIN — ATORVASTATIN CALCIUM 40 MILLIGRAM(S): 80 TABLET, FILM COATED ORAL at 21:19

## 2022-12-12 RX ADMIN — SODIUM CHLORIDE 5 MILLILITER(S): 9 INJECTION INTRAMUSCULAR; INTRAVENOUS; SUBCUTANEOUS at 19:49

## 2022-12-12 RX ADMIN — MILRINONE LACTATE 2.79 MICROGRAM(S)/KG/MIN: 1 INJECTION, SOLUTION INTRAVENOUS at 19:42

## 2022-12-12 RX ADMIN — POLYETHYLENE GLYCOL 3350 17 GRAM(S): 17 POWDER, FOR SOLUTION ORAL at 18:17

## 2022-12-12 RX ADMIN — FLUCONAZOLE 100 MILLIGRAM(S): 150 TABLET ORAL at 05:27

## 2022-12-12 NOTE — PROGRESS NOTE ADULT - SUBJECTIVE AND OBJECTIVE BOX
[facility] DIVISION OF KIDNEY DISEASES AND HYPERTENSION   FOLLOW UP NOTE    --------------------------------------------------------------------------------  HPI:  61 y/o male with PMH of CABG, DM, HTN, HLD presenting as transfer from [place] for c/f STEMI. Course c/b gallstone pancreatitis leading to ARDS and shock & cardiac arrest now on [facility]. Had significant troponin elevation and his LVEF down to 8%. Pt was deemed to be too unstable to have an angiogram and potential PCI due to his co-morbidities & a new subdural bleed. Pt being seen for ERIC. SCr on arrival was 2.15; trended down to hector 1.6 on 11/25 and eventually increased to 3.95 11/28. Pt received IV diuretics as per CTU.    Patient seen & examined today. Pt is intubated, unable to obtain ROS. Pt initiated on CRRT on 12/5/22 to optimize volume status and electrolytes. Pt tolerating CRRT overnight with target net negative fluid balance. Pt underwent dennaculation of ECMO on 12/8/22. Pt was restarted overnight on 12/9/22 for volume overload.  Pt tolerating CRRT.     PAST HISTORY  --------------------------------------------------------------------------------  No significant changes to PMH, PSH, FHx, SHx, unless otherwise noted    ALLERGIES & MEDICATIONS  --------------------------------------------------------------------------------  Allergies    No Known Allergies    Intolerances      Standing Inpatient Medications  aMIOdarone    Tablet   Oral   aMIOdarone    Tablet 400 milliGRAM(s) Oral every 8 hours  aMIOdarone Infusion 0.5 mG/Min IV Continuous <Continuous>  atorvastatin 40 milliGRAM(s) Oral at bedtime  chlorhexidine 0.12% Liquid 15 milliLiter(s) Oral Mucosa every 12 hours  chlorhexidine 2% Cloths 1 Application(s) Topical <User Schedule>  CRRT Treatment    <Continuous>  fluconAZOLE IVPB      fluconAZOLE IVPB 200 milliGRAM(s) IV Intermittent every 24 hours  heparin  Infusion 800 Unit(s)/Hr IV Continuous <Continuous>  insulin lispro (ADMELOG) corrective regimen sliding scale   SubCutaneous every 4 hours  meropenem  IVPB 1000 milliGRAM(s) IV Intermittent every 12 hours  milrinone Infusion 0.1 MICROgram(s)/kG/Min IV Continuous <Continuous>  pantoprazole  Injectable 40 milliGRAM(s) IV Push daily  Phoxillum Filtration BK 4 / 2.5 5000 milliLiter(s) CRRT <Continuous>  polyethylene glycol 3350 17 Gram(s) Oral two times a day  PrismaSATE Dialysate BK 0 / 3.5 5000 milliLiter(s) CRRT <Continuous>  PrismaSOL Filtration BGK 4 / 2.5 5000 milliLiter(s) CRRT <Continuous>  senna 2 Tablet(s) Oral at bedtime  sodium chloride 0.9%. 1000 milliLiter(s) IV Continuous <Continuous>  vancomycin  IVPB 1000 milliGRAM(s) IV Intermittent every 24 hours  vasopressin Infusion 0.033 Unit(s)/Min IV Continuous <Continuous>    PRN Inpatient Medications      REVIEW OF SYSTEMS  --------------------------------------------------------------------------------  Unable to obtain    VITALS/PHYSICAL EXAM  --------------------------------------------------------------------------------  T(C): 36.9 (12-12-22 @ 08:00), Max: 38.2 (12-12-22 @ 01:45)  HR: 107 (12-12-22 @ 11:00) (80 - 170)  BP: --  RR: 23 (12-12-22 @ 11:00) (8 - 32)  SpO2: 100% (12-12-22 @ 11:00) (97% - 100%)  Wt(kg): --    12-11-22 @ 07:01  -  12-12-22 @ 07:00  --------------------------------------------------------  IN: 3058.8 mL / OUT: 3112 mL / NET: -53.2 mL    12-12-22 @ 07:01  -  12-12-22 @ 11:40  --------------------------------------------------------  IN: 454 mL / OUT: 699 mL / NET: -245 mL      Physical Exam:  	Gen: ill appearing male intubated   	HEENT: Anicteric  	Pulm: on CMV via ETT+  	CV: S1S2+  	Abd: Soft, +BS       	Ext: LE edema B/L++  	Neuro: awake and nods spontaneously  	Skin: Warm and dry              Dialysis acces: right non tunneled HD catheter    LABS/STUDIES  --------------------------------------------------------------------------------              9.2    18.07 >-----------<  142      [12-12-22 @ 00:41]              28.2     134  |  99  |  25  ----------------------------<  100      [12-12-22 @ 00:41]  3.8   |  20  |  1.55        Ca     8.3     [12-12-22 @ 00:41]      Mg     2.1     [12-12-22 @ 00:41]      Phos  3.4     [12-12-22 @ 00:41]    Creatinine Trend:  SCr 1.55 [12-12 @ 00:41]  SCr 1.84 [12-11 @ 00:26]  SCr 2.19 [12-10 @ 00:47]  SCr 2.29 [12-09 @ 12:23]  SCr 3.10 [12-09 @ 00:19]    Urinalysis - [12-10-22 @ 20:01]      Color Brown / Appearance Slightly Turbid / SG 1.020 / pH 5.0      Gluc 100 mg/dL / Ketone Trace  / Bili Moderate / Urobili Negative       Blood Large / Protein 300 mg/dL / Leuk Est Large / Nitrite Positive      RBC >50 / WBC >50 / Hyaline  / Gran 10 / Sq Epi  / Non Sq Epi 3 / Bacteria Moderate [facility] DIVISION OF KIDNEY DISEASES AND HYPERTENSION   FOLLOW UP NOTE    --------------------------------------------------------------------------------  HPI:  61 y/o male with PMH of CABG, DM, HTN, HLD presenting as transfer from [place] for c/f STEMI. Course c/b gallstone pancreatitis leading to ARDS and shock & cardiac arrest now on [facility]. Had significant troponin elevation and his LVEF down to 8%. Pt was deemed to be too unstable to have an angiogram and potential PCI due to his co-morbidities & a new subdural bleed. Pt being seen for ERIC. SCr on arrival was 2.15; trended down to hector 1.6 on 11/25 and eventually increased to 3.95 11/28. Pt received IV diuretics as per CTU.    Patient seen & examined today. Pt is intubated, unable to obtain ROS. Pt initiated on CRRT on 12/5/22 to optimize volume status and electrolytes. Pt tolerating CRRT overnight with target net negative fluid balance. Pt underwent dennaculation of ECMO on 12/8/22. Pt was restarted overnight on 12/9/22 for volume overload.  Pt tolerating CRRT.     PAST HISTORY  --------------------------------------------------------------------------------  No significant changes to PMH, PSH, FHx, SHx, unless otherwise noted    ALLERGIES & MEDICATIONS  --------------------------------------------------------------------------------  Allergies    No Known Allergies    Intolerances      Standing Inpatient Medications  aMIOdarone    Tablet   Oral   aMIOdarone    Tablet 400 milliGRAM(s) Oral every 8 hours  aMIOdarone Infusion 0.5 mG/Min IV Continuous <Continuous>  atorvastatin 40 milliGRAM(s) Oral at bedtime  chlorhexidine 0.12% Liquid 15 milliLiter(s) Oral Mucosa every 12 hours  chlorhexidine 2% Cloths 1 Application(s) Topical <User Schedule>  CRRT Treatment    <Continuous>  fluconAZOLE IVPB      fluconAZOLE IVPB 200 milliGRAM(s) IV Intermittent every 24 hours  heparin  Infusion 800 Unit(s)/Hr IV Continuous <Continuous>  insulin lispro (ADMELOG) corrective regimen sliding scale   SubCutaneous every 4 hours  meropenem  IVPB 1000 milliGRAM(s) IV Intermittent every 12 hours  milrinone Infusion 0.1 MICROgram(s)/kG/Min IV Continuous <Continuous>  pantoprazole  Injectable 40 milliGRAM(s) IV Push daily  Phoxillum Filtration BK 4 / 2.5 5000 milliLiter(s) CRRT <Continuous>  polyethylene glycol 3350 17 Gram(s) Oral two times a day  PrismaSATE Dialysate BK 0 / 3.5 5000 milliLiter(s) CRRT <Continuous>  PrismaSOL Filtration BGK 4 / 2.5 5000 milliLiter(s) CRRT <Continuous>  senna 2 Tablet(s) Oral at bedtime  sodium chloride 0.9%. 1000 milliLiter(s) IV Continuous <Continuous>  vancomycin  IVPB 1000 milliGRAM(s) IV Intermittent every 24 hours  vasopressin Infusion 0.033 Unit(s)/Min IV Continuous <Continuous>    PRN Inpatient Medications      REVIEW OF SYSTEMS  --------------------------------------------------------------------------------  Unable to obtain    VITALS/PHYSICAL EXAM  --------------------------------------------------------------------------------  T(C): 36.9 (12-12-22 @ 08:00), Max: 38.2 (12-12-22 @ 01:45)  HR: 107 (12-12-22 @ 11:00) (80 - 170)  BP: --  RR: 23 (12-12-22 @ 11:00) (8 - 32)  SpO2: 100% (12-12-22 @ 11:00) (97% - 100%)  Wt(kg): --    12-11-22 @ 07:01  -  12-12-22 @ 07:00  --------------------------------------------------------  IN: 3058.8 mL / OUT: 3112 mL / NET: -53.2 mL    12-12-22 @ 07:01  -  12-12-22 @ 11:40  --------------------------------------------------------  IN: 454 mL / OUT: 699 mL / NET: -245 mL      Physical Exam:  	Gen: ill appearing male intubated   	HEENT: Anicteric  	Pulm: on CMV via ETT+  	CV: S1S2+  	Abd: Soft, +BS       	Ext: LE edema B/L++  	Neuro: awake and nods spontaneously  	Skin: Warm and dry              Dialysis acces: right non tunneled HD catheter    LABS/STUDIES  --------------------------------------------------------------------------------              9.2    18.07 >-----------<  142      [12-12-22 @ 00:41]              28.2     134  |  99  |  25  ----------------------------<  100      [12-12-22 @ 00:41]  3.8   |  20  |  1.55        Ca     8.3     [12-12-22 @ 00:41]      Mg     2.1     [12-12-22 @ 00:41]      Phos  3.4     [12-12-22 @ 00:41]    Creatinine Trend:  SCr 1.55 [12-12 @ 00:41]  SCr 1.84 [12-11 @ 00:26]  SCr 2.19 [12-10 @ 00:47]  SCr 2.29 [12-09 @ 12:23]  SCr 3.10 [12-09 @ 00:19]    Urinalysis - [12-10-22 @ 20:01]      Color Brown / Appearance Slightly Turbid / SG 1.020 / pH 5.0      Gluc 100 mg/dL / Ketone Trace  / Bili Moderate / Urobili Negative       Blood Large / Protein 300 mg/dL / Leuk Est Large / Nitrite Positive      RBC >50 / WBC >50 / Hyaline  / Gran 10 / Sq Epi  / Non Sq Epi 3 / Bacteria Moderate [facility] DIVISION OF KIDNEY DISEASES AND HYPERTENSION   FOLLOW UP NOTE    --------------------------------------------------------------------------------  HPI:  63 y/o male with PMH of CABG, DM, HTN, HLD presenting as transfer from [place] for c/f STEMI. Course c/b gallstone pancreatitis leading to ARDS and shock & cardiac arrest now on [facility]. Had significant troponin elevation and his LVEF down to 8%. Pt was deemed to be too unstable to have an angiogram and potential PCI due to his co-morbidities & a new subdural bleed. Pt being seen for ERIC. SCr on arrival was 2.15; trended down to hector 1.6 on 11/25 and eventually increased to 3.95 11/28. Pt received IV diuretics as per CTU.    Patient seen & examined today. Pt is intubated, unable to obtain ROS. Pt initiated on CRRT on 12/5/22 to optimize volume status and electrolytes. Pt tolerating CRRT overnight with target net negative fluid balance. Pt underwent dennaculation of ECMO on 12/8/22. Pt was restarted overnight on 12/9/22 for volume overload.  Pt tolerating CRRT.     PAST HISTORY  --------------------------------------------------------------------------------  No significant changes to PMH, PSH, FHx, SHx, unless otherwise noted    ALLERGIES & MEDICATIONS  --------------------------------------------------------------------------------  Allergies    No Known Allergies    Intolerances      Standing Inpatient Medications  aMIOdarone    Tablet   Oral   aMIOdarone    Tablet 400 milliGRAM(s) Oral every 8 hours  aMIOdarone Infusion 0.5 mG/Min IV Continuous <Continuous>  atorvastatin 40 milliGRAM(s) Oral at bedtime  chlorhexidine 0.12% Liquid 15 milliLiter(s) Oral Mucosa every 12 hours  chlorhexidine 2% Cloths 1 Application(s) Topical <User Schedule>  CRRT Treatment    <Continuous>  fluconAZOLE IVPB      fluconAZOLE IVPB 200 milliGRAM(s) IV Intermittent every 24 hours  heparin  Infusion 800 Unit(s)/Hr IV Continuous <Continuous>  insulin lispro (ADMELOG) corrective regimen sliding scale   SubCutaneous every 4 hours  meropenem  IVPB 1000 milliGRAM(s) IV Intermittent every 12 hours  milrinone Infusion 0.1 MICROgram(s)/kG/Min IV Continuous <Continuous>  pantoprazole  Injectable 40 milliGRAM(s) IV Push daily  Phoxillum Filtration BK 4 / 2.5 5000 milliLiter(s) CRRT <Continuous>  polyethylene glycol 3350 17 Gram(s) Oral two times a day  PrismaSATE Dialysate BK 0 / 3.5 5000 milliLiter(s) CRRT <Continuous>  PrismaSOL Filtration BGK 4 / 2.5 5000 milliLiter(s) CRRT <Continuous>  senna 2 Tablet(s) Oral at bedtime  sodium chloride 0.9%. 1000 milliLiter(s) IV Continuous <Continuous>  vancomycin  IVPB 1000 milliGRAM(s) IV Intermittent every 24 hours  vasopressin Infusion 0.033 Unit(s)/Min IV Continuous <Continuous>    PRN Inpatient Medications      REVIEW OF SYSTEMS  --------------------------------------------------------------------------------  Unable to obtain    VITALS/PHYSICAL EXAM  --------------------------------------------------------------------------------  T(C): 36.9 (12-12-22 @ 08:00), Max: 38.2 (12-12-22 @ 01:45)  HR: 107 (12-12-22 @ 11:00) (80 - 170)  BP: --  RR: 23 (12-12-22 @ 11:00) (8 - 32)  SpO2: 100% (12-12-22 @ 11:00) (97% - 100%)  Wt(kg): --    12-11-22 @ 07:01  -  12-12-22 @ 07:00  --------------------------------------------------------  IN: 3058.8 mL / OUT: 3112 mL / NET: -53.2 mL    12-12-22 @ 07:01  -  12-12-22 @ 11:40  --------------------------------------------------------  IN: 454 mL / OUT: 699 mL / NET: -245 mL      Physical Exam:  	Gen: ill appearing male intubated   	HEENT: Anicteric  	Pulm: on CMV via ETT+  	CV: S1S2+  	Abd: Soft, +BS       	Ext: LE edema B/L++  	Neuro: awake and nods spontaneously  	Skin: Warm and dry              Dialysis acces: right non tunneled HD catheter    LABS/STUDIES  --------------------------------------------------------------------------------              9.2    18.07 >-----------<  142      [12-12-22 @ 00:41]              28.2     134  |  99  |  25  ----------------------------<  100      [12-12-22 @ 00:41]  3.8   |  20  |  1.55        Ca     8.3     [12-12-22 @ 00:41]      Mg     2.1     [12-12-22 @ 00:41]      Phos  3.4     [12-12-22 @ 00:41]    Creatinine Trend:  SCr 1.55 [12-12 @ 00:41]  SCr 1.84 [12-11 @ 00:26]  SCr 2.19 [12-10 @ 00:47]  SCr 2.29 [12-09 @ 12:23]  SCr 3.10 [12-09 @ 00:19]    Urinalysis - [12-10-22 @ 20:01]      Color Brown / Appearance Slightly Turbid / SG 1.020 / pH 5.0      Gluc 100 mg/dL / Ketone Trace  / Bili Moderate / Urobili Negative       Blood Large / Protein 300 mg/dL / Leuk Est Large / Nitrite Positive      RBC >50 / WBC >50 / Hyaline  / Gran 10 / Sq Epi  / Non Sq Epi 3 / Bacteria Moderate

## 2022-12-12 NOTE — PROGRESS NOTE ADULT - PROBLEM SELECTOR PLAN 1
- troponin peaked at > 71941   - [facility] 12/06 with IABP placement revealed that 3 grafts are closed w/ distal native disease from remaining LAD graft  - continue statin  - would start ASA when okay with surgical team. - troponin peaked at > 06659   - [facility] 12/06 with IABP placement revealed that 3 grafts are closed w/ distal native disease from remaining LAD graft  - continue statin  - would start ASA when okay with surgical team. - troponin peaked at > 78579   - [facility] 12/06 with IABP placement revealed that 3 grafts are closed w/ distal native disease from remaining LAD graft  - continue statin  - would start ASA when okay with surgical team.

## 2022-12-12 NOTE — PROGRESS NOTE ADULT - ASSESSMENT
62M CAD, CABG, DM, HTN, chol admitted with pancreatitis not necrotizing on the CT scan and cholecystitis.  Sent to SICU but developed respiratory failure presumed to be due to ARDS. Moved to the CTU for ECMO for cardiogenic shock vs ARDS.  ECMO out, IABP placed.  Leukocytosis    Fever, rising leucocytosis, SIRS/sepsis, ERIC  continue meropenem vanco  level ok    diflucan added   - repeat UC and BC sent and are pending  - f/u CT results

## 2022-12-12 NOTE — PROGRESS NOTE ADULT - ASSESSMENT
63 YO M with a history of CAD s/p 4v CABG ~2019 in [facility], DM2 (A1c 7.3%), and HTN who initially presented to OSH with abdominal pain and n/v and found to have pancreatitis with lipase > 3000 though also with elevated troponins. CT abdomen revealed acute pancreatitis and his initial LVEF was 40-45%. He developed MSOF with hypoxic respiratory failure requiring intubation and ERIC and went into hypoxic PEA arrest requiring ACLS and due to persistent hypoxia and hypotension on pressors after ROSC was cannulated to VA ECMO (LFV, RFA, no anterograde perfusion catheter) on 11/25. Notably CTH that day revealed small subdural hemorrhage.     His post arrest TTE on 11/26 showed a signficantly worse LVEF of 5-10% with an AV that was only minimally opening. Since then he has had improvement in his LV function (now ~35-40%) and improved pulsatility while tolerating progressive slow ECMO weans. ECMO turndown (note in chart 11/30) was reassuring for ability to decannulate to IABP/inotropes. LHC 12/06 showed closure of his 3 vein grafts with graft to LAD patent though with distal native disease. IABP placed, ECMO successfully decannulated 12/08 (transfused 2u pRBCs during). His ARDS is improving. Currently on CVVH.    Previous cardiac studies:  LHC (12/06/22) - patent LIMA-LAD, RCA , LCx , aortogram w/ closure of 3 vein grafts, LVEDP 22 mm Hg, left-to-left and left-to-right collaterals  TTE (12/03/22) - mod-to-sev segmental LVSD (inferolateral, inferior, inferoseptal hypokinesis)    Hemodynamics:  12/09 IABP 1:1, aDBP 126, aMAP 92, CVP 6, lactate 1.2  12/08 IABP 1:1, aDBP 120, aMAP 87, CVP 5  12/07 IABP 1:1 ECMO 3600 RPM 4.0 LPM, aMAP 90, aDBP 123, mVO2 66.8%, CVP 7, CO 6.8, CI 3.3    *** INCOMPLETE NOTE *** INCOMPLETE NOTE *** INCOMPLETE NOTE *** INCOMPLETE NOTE *** INCOMPLETE NOTE *** INCOMPLETE NOTE *** INCOMPLETE NOTE *** INCOMPLETE NOTE *** INCOMPLETE NOTE *** INCOMPLETE NOTE *** INCOMPLETE NOTE *** INCOMPLETE NOTE *** INCOMPLETE NOTE *** INCOMPLETE NOTE *** INCOMPLETE NOTE *** INCOMPLETE NOTE *** INCOMPLETE NOTE *** INCOMPLETE NOTE *** INCOMPLETE NOTE *** INCOMPLETE NOTE ***  RECS BELOW ARE NOT TO BE FOLLOWED UNTIL FINALIZED  amio 400 q8h, amio gtt, atorva 40, CRRT, hep gtt, mil 0.1, vaso gtt  zaina, vanc    Problem/Plan - 1:  ·  Problem: Non-ST elevation MI (NSTEMI).   ·  Plan:   - troponin peaked at > 03691   - [facility] 12/06 with IABP placement revealed that 3 grafts are closed w/ distal native disease from remaining LAD graft  - continue statin  - would start ASA when okay with surgical team.    Problem/Plan - 2:  ·  Problem: Gall stone pancreatitis.   ·  Plan:   - CT abd showing acute pancreatitis  - RUQ showing sludge, no stones  - lipase > 3000 11/24, normalized prior and now fluctuating  - conservative care  - appreciate GI recs, no intervention planned   - surgery following   - appreciate ID recs.    Problem/Plan - 3:  ·  Problem: ERIC (acute kidney injury).   ·  Plan:   - likely arrest associated ATN and now worsened from hypovolemia   - renal following, c/t CVVH.    Problem/Plan - 4:  ·  Problem: Cardiogenic shock.   ·  Plan:   - ECMO decannulated 12/08 after successful wean  - IABP in place, keep 1:1, target augmented MAP 70s, start nitro gtt today to achieve target, plan for IABP wean tomorrow  - cont argatroban for AC while on IABP  - send infectious workup and start broad spec abx given lack of augmentation of MAP w/ IABP and concern for vasoplegia/febrile  - lower milrinone  - added levo given concern for septic component.    Problem/Plan - 5:  ·  Problem: Nontraumatic subdural hematoma.   ·  Plan:   - small 3mm subdural hemorrhage stable on repeat CTs  - okay to continue argatroban, appreciate neuro recs  - keep MAP < 75 mmHg and careful monitoring of PTT.    Problem/Plan - 6:  ·  Problem: Acute respiratory failure with hypoxia.   ·  Plan:   - CXR improved with more lung volumes after bronch and increased PEEP  - bronch showed erythematous airways with scant bleeding  - extubate when able  - continue treatment for pancreatitis related ARDS  - caution with propofol gtt given pancreatis, would follow lipids.    Problem/Plan - 7:  ·  Problem: Supraventricular tachycardia.   ·  Plan:   - on review of tele, it appears to be sinus tach  - consider stopping amio.    Problem/Plan - 8:  ·  Problem: Fever.   ·  Plan:   - f/u ID recs, multiple potential sources given recent lines/procedures, as well as possible biliary source  - consider removing non-vital lines  - f/u pan cultures and start abx.    Had drop in sat and was stated on dobutamine leading to aflutter so started on amiodarone. Notably tachycardic and hypotensive with increasing pressors. On exam, NAD, JVP approx 10-12 cm w/ HJR, RRR, CTA anterior lung fields, abdomen soft with bowel sounds, 1+ edema in UE/LE, pulses intact. Labs reviewed - WBC 17.8 (from 22), Na 136 (improved), BUN/Cr 34/1.89 (on HD), albumin 3.0, amylase/lipase stably elevated (improved from prior). LHC 12/6 revealed patent GUTIÉRREZ to LAD,  of RCA/LCx with L to L and L to R collaterals; aortogram revealed no filling of any other grafts.   - would consider keeping net negative  - c/w IABP and will attempt wean with inotropic support if necessary  - c/w anticoagulation  - on statin  - notably febrile; would change lines and start empiric abx   - prognosis guarded; family meeting held 12/6 and discussed at length current state and next steps with plan to f/u next week 63 YO M with a history of CAD s/p 4v CABG ~2019 in [facility], DM2 (A1c 7.3%), and HTN who initially presented to OSH with abdominal pain and n/v and found to have pancreatitis with lipase > 3000 though also with elevated troponins. CT abdomen revealed acute pancreatitis and his initial LVEF was 40-45%. He developed MSOF with hypoxic respiratory failure requiring intubation and ERIC and went into hypoxic PEA arrest requiring ACLS and due to persistent hypoxia and hypotension on pressors after ROSC was cannulated to VA ECMO (LFV, RFA, no anterograde perfusion catheter) on 11/25. Notably CTH that day revealed small subdural hemorrhage.     His post arrest TTE on 11/26 showed a signficantly worse LVEF of 5-10% with an AV that was only minimally opening. Since then he has had improvement in his LV function (now ~35-40%) and improved pulsatility while tolerating progressive slow ECMO weans. ECMO turndown (note in chart 11/30) was reassuring for ability to decannulate to IABP/inotropes. LHC 12/06 showed closure of his 3 vein grafts with graft to LAD patent though with distal native disease. IABP placed, ECMO successfully decannulated 12/08 (transfused 2u pRBCs during). His ARDS is improving. Currently on CVVH.    Previous cardiac studies:  LHC (12/06/22) - patent LIMA-LAD, RCA , LCx , aortogram w/ closure of 3 vein grafts, LVEDP 22 mm Hg, left-to-left and left-to-right collaterals  TTE (12/03/22) - mod-to-sev segmental LVSD (inferolateral, inferior, inferoseptal hypokinesis)    Hemodynamics:  12/09 IABP 1:1, aDBP 126, aMAP 92, CVP 6, lactate 1.2  12/08 IABP 1:1, aDBP 120, aMAP 87, CVP 5  12/07 IABP 1:1 ECMO 3600 RPM 4.0 LPM, aMAP 90, aDBP 123, mVO2 66.8%, CVP 7, CO 6.8, CI 3.3    *** INCOMPLETE NOTE *** INCOMPLETE NOTE *** INCOMPLETE NOTE *** INCOMPLETE NOTE *** INCOMPLETE NOTE *** INCOMPLETE NOTE *** INCOMPLETE NOTE *** INCOMPLETE NOTE *** INCOMPLETE NOTE *** INCOMPLETE NOTE *** INCOMPLETE NOTE *** INCOMPLETE NOTE *** INCOMPLETE NOTE *** INCOMPLETE NOTE *** INCOMPLETE NOTE *** INCOMPLETE NOTE *** INCOMPLETE NOTE *** INCOMPLETE NOTE *** INCOMPLETE NOTE *** INCOMPLETE NOTE ***  RECS BELOW ARE NOT TO BE FOLLOWED UNTIL FINALIZED  amio 400 q8h, amio gtt, atorva 40, CRRT, hep gtt, mil 0.1, vaso gtt  zaina, vanc    Problem/Plan - 1:  ·  Problem: Non-ST elevation MI (NSTEMI).   ·  Plan:   - troponin peaked at > 54370   - [facility] 12/06 with IABP placement revealed that 3 grafts are closed w/ distal native disease from remaining LAD graft  - continue statin  - would start ASA when okay with surgical team.    Problem/Plan - 2:  ·  Problem: Gall stone pancreatitis.   ·  Plan:   - CT abd showing acute pancreatitis  - RUQ showing sludge, no stones  - lipase > 3000 11/24, normalized prior and now fluctuating  - conservative care  - appreciate GI recs, no intervention planned   - surgery following   - appreciate ID recs.    Problem/Plan - 3:  ·  Problem: ERIC (acute kidney injury).   ·  Plan:   - likely arrest associated ATN and now worsened from hypovolemia   - renal following, c/t CVVH.    Problem/Plan - 4:  ·  Problem: Cardiogenic shock.   ·  Plan:   - ECMO decannulated 12/08 after successful wean  - IABP in place, keep 1:1, target augmented MAP 70s, start nitro gtt today to achieve target, plan for IABP wean tomorrow  - cont argatroban for AC while on IABP  - send infectious workup and start broad spec abx given lack of augmentation of MAP w/ IABP and concern for vasoplegia/febrile  - lower milrinone  - added levo given concern for septic component.    Problem/Plan - 5:  ·  Problem: Nontraumatic subdural hematoma.   ·  Plan:   - small 3mm subdural hemorrhage stable on repeat CTs  - okay to continue argatroban, appreciate neuro recs  - keep MAP < 75 mmHg and careful monitoring of PTT.    Problem/Plan - 6:  ·  Problem: Acute respiratory failure with hypoxia.   ·  Plan:   - CXR improved with more lung volumes after bronch and increased PEEP  - bronch showed erythematous airways with scant bleeding  - extubate when able  - continue treatment for pancreatitis related ARDS  - caution with propofol gtt given pancreatis, would follow lipids.    Problem/Plan - 7:  ·  Problem: Supraventricular tachycardia.   ·  Plan:   - on review of tele, it appears to be sinus tach  - consider stopping amio.    Problem/Plan - 8:  ·  Problem: Fever.   ·  Plan:   - f/u ID recs, multiple potential sources given recent lines/procedures, as well as possible biliary source  - consider removing non-vital lines  - f/u pan cultures and start abx.    Had drop in sat and was stated on dobutamine leading to aflutter so started on amiodarone. Notably tachycardic and hypotensive with increasing pressors. On exam, NAD, JVP approx 10-12 cm w/ HJR, RRR, CTA anterior lung fields, abdomen soft with bowel sounds, 1+ edema in UE/LE, pulses intact. Labs reviewed - WBC 17.8 (from 22), Na 136 (improved), BUN/Cr 34/1.89 (on HD), albumin 3.0, amylase/lipase stably elevated (improved from prior). LHC 12/6 revealed patent GUTIÉRREZ to LAD,  of RCA/LCx with L to L and L to R collaterals; aortogram revealed no filling of any other grafts.   - would consider keeping net negative  - c/w IABP and will attempt wean with inotropic support if necessary  - c/w anticoagulation  - on statin  - notably febrile; would change lines and start empiric abx   - prognosis guarded; family meeting held 12/6 and discussed at length current state and next steps with plan to f/u next week 61 YO M with a history of CAD s/p 4v CABG ~2019 in [facility], DM2 (A1c 7.3%), and HTN who initially presented to OSH with abdominal pain and n/v and found to have pancreatitis with lipase > 3000 though also with elevated troponins. CT abdomen revealed acute pancreatitis and his initial LVEF was 40-45%. He developed MSOF with hypoxic respiratory failure requiring intubation and ERIC and went into hypoxic PEA arrest requiring ACLS and due to persistent hypoxia and hypotension on pressors after ROSC was cannulated to VA ECMO (LFV, RFA, no anterograde perfusion catheter) on 11/25. Notably CTH that day revealed small subdural hemorrhage.     His post arrest TTE on 11/26 showed a signficantly worse LVEF of 5-10% with an AV that was only minimally opening. Since then he has had improvement in his LV function (now ~35-40%) and improved pulsatility while tolerating progressive slow ECMO weans. ECMO turndown (note in chart 11/30) was reassuring for ability to decannulate to IABP/inotropes. LHC 12/06 showed closure of his 3 vein grafts with graft to LAD patent though with distal native disease. IABP placed, ECMO successfully decannulated 12/08 (transfused 2u pRBCs during). His ARDS is improving. Currently on CVVH.    Previous cardiac studies:  LHC (12/06/22) - patent LIMA-LAD, RCA , LCx , aortogram w/ closure of 3 vein grafts, LVEDP 22 mm Hg, left-to-left and left-to-right collaterals  TTE (12/03/22) - mod-to-sev segmental LVSD (inferolateral, inferior, inferoseptal hypokinesis)    Hemodynamics:  12/09 IABP 1:1, aDBP 126, aMAP 92, CVP 6, lactate 1.2  12/08 IABP 1:1, aDBP 120, aMAP 87, CVP 5  12/07 IABP 1:1 ECMO 3600 RPM 4.0 LPM, aMAP 90, aDBP 123, mVO2 66.8%, CVP 7, CO 6.8, CI 3.3    *** INCOMPLETE NOTE *** INCOMPLETE NOTE *** INCOMPLETE NOTE *** INCOMPLETE NOTE *** INCOMPLETE NOTE *** INCOMPLETE NOTE *** INCOMPLETE NOTE *** INCOMPLETE NOTE *** INCOMPLETE NOTE *** INCOMPLETE NOTE *** INCOMPLETE NOTE *** INCOMPLETE NOTE *** INCOMPLETE NOTE *** INCOMPLETE NOTE *** INCOMPLETE NOTE *** INCOMPLETE NOTE *** INCOMPLETE NOTE *** INCOMPLETE NOTE *** INCOMPLETE NOTE *** INCOMPLETE NOTE ***  RECS BELOW ARE NOT TO BE FOLLOWED UNTIL FINALIZED  amio 400 q8h, amio gtt, atorva 40, CRRT, hep gtt, mil 0.1, vaso gtt  zaina, vanc    Problem/Plan - 1:  ·  Problem: Non-ST elevation MI (NSTEMI).   ·  Plan:   - troponin peaked at > 88872   - [facility] 12/06 with IABP placement revealed that 3 grafts are closed w/ distal native disease from remaining LAD graft  - continue statin  - would start ASA when okay with surgical team.    Problem/Plan - 2:  ·  Problem: Gall stone pancreatitis.   ·  Plan:   - CT abd showing acute pancreatitis  - RUQ showing sludge, no stones  - lipase > 3000 11/24, normalized prior and now fluctuating  - conservative care  - appreciate GI recs, no intervention planned   - surgery following   - appreciate ID recs.    Problem/Plan - 3:  ·  Problem: ERIC (acute kidney injury).   ·  Plan:   - likely arrest associated ATN and now worsened from hypovolemia   - renal following, c/t CVVH.    Problem/Plan - 4:  ·  Problem: Cardiogenic shock.   ·  Plan:   - ECMO decannulated 12/08 after successful wean  - IABP in place, keep 1:1, target augmented MAP 70s, start nitro gtt today to achieve target, plan for IABP wean tomorrow  - cont argatroban for AC while on IABP  - send infectious workup and start broad spec abx given lack of augmentation of MAP w/ IABP and concern for vasoplegia/febrile  - lower milrinone  - added levo given concern for septic component.    Problem/Plan - 5:  ·  Problem: Nontraumatic subdural hematoma.   ·  Plan:   - small 3mm subdural hemorrhage stable on repeat CTs  - okay to continue argatroban, appreciate neuro recs  - keep MAP < 75 mmHg and careful monitoring of PTT.    Problem/Plan - 6:  ·  Problem: Acute respiratory failure with hypoxia.   ·  Plan:   - CXR improved with more lung volumes after bronch and increased PEEP  - bronch showed erythematous airways with scant bleeding  - extubate when able  - continue treatment for pancreatitis related ARDS  - caution with propofol gtt given pancreatis, would follow lipids.    Problem/Plan - 7:  ·  Problem: Supraventricular tachycardia.   ·  Plan:   - on review of tele, it appears to be sinus tach  - consider stopping amio.    Problem/Plan - 8:  ·  Problem: Fever.   ·  Plan:   - f/u ID recs, multiple potential sources given recent lines/procedures, as well as possible biliary source  - consider removing non-vital lines  - f/u pan cultures and start abx.    Had drop in sat and was stated on dobutamine leading to aflutter so started on amiodarone. Notably tachycardic and hypotensive with increasing pressors. On exam, NAD, JVP approx 10-12 cm w/ HJR, RRR, CTA anterior lung fields, abdomen soft with bowel sounds, 1+ edema in UE/LE, pulses intact. Labs reviewed - WBC 17.8 (from 22), Na 136 (improved), BUN/Cr 34/1.89 (on HD), albumin 3.0, amylase/lipase stably elevated (improved from prior). LHC 12/6 revealed patent GUTIÉRREZ to LAD,  of RCA/LCx with L to L and L to R collaterals; aortogram revealed no filling of any other grafts.   - would consider keeping net negative  - c/w IABP and will attempt wean with inotropic support if necessary  - c/w anticoagulation  - on statin  - notably febrile; would change lines and start empiric abx   - prognosis guarded; family meeting held 12/6 and discussed at length current state and next steps with plan to f/u next week 61 YO M with a history of CAD s/p 4v CABG ~2019 in [facility], DM2 (A1c 7.3%), and HTN who initially presented to OSH with abdominal pain and n/v and found to have pancreatitis with lipase > 3000 though also with elevated troponins. CT abdomen revealed acute pancreatitis and his initial LVEF was 40-45%. He developed MSOF with hypoxic respiratory failure requiring intubation and ERIC and went into hypoxic PEA arrest requiring ACLS and due to persistent hypoxia and hypotension on pressors after ROSC was cannulated to VA ECMO (LFV, RFA, no anterograde perfusion catheter) on 11/25. Notably CTH that day revealed small subdural hemorrhage.     His post arrest TTE on 11/26 showed a signficantly worse LVEF of 5-10% with an AV that was only minimally opening. Since then he has had improvement in his LV function (now ~35-40%) and improved pulsatility while tolerating progressive slow ECMO weans. ECMO turndown (note in chart 11/30) was reassuring for ability to decannulate to IABP/inotropes. LHC 12/06 showed closure of his 3 vein grafts with graft to LAD patent though with distal native disease. IABP placed, ECMO successfully decannulated 12/08 (transfused 2u pRBCs during). His ARDS is improving. Currently on CVVH.    Previous cardiac studies:  LHC (12/06/22) - patent LIMA-LAD, RCA , LCx , aortogram w/ closure of 3 vein grafts, LVEDP 22 mm Hg, left-to-left and left-to-right collaterals  TTE (12/03/22) - mod-to-sev segmental LVSD (inferolateral, inferior, inferoseptal hypokinesis)    Hemodynamics:  12/12 IABP 1:1, aDBP 103, aMAP 84, CVP 7, lactate 1.3, mVO2 79.3%, CO 10.5, CI 5.0,   12/09 IABP 1:1, aDBP 126, aMAP 92, CVP 6, lactate 1.2  12/08 IABP 1:1, aDBP 120, aMAP 87, CVP 5  12/07 IABP 1:1 ECMO 3600 RPM 4.0 LPM, aMAP 90, aDBP 123, mVO2 66.8%, CVP 7, CO 6.8, CI 3.3 61 YO M with a history of CAD s/p 4v CABG ~2019 in [facility], DM2 (A1c 7.3%), and HTN who initially presented to OSH with abdominal pain and n/v and found to have pancreatitis with lipase > 3000 though also with elevated troponins. CT abdomen revealed acute pancreatitis and his initial LVEF was 40-45%. He developed MSOF with hypoxic respiratory failure requiring intubation and ERIC and went into hypoxic PEA arrest requiring ACLS and due to persistent hypoxia and hypotension on pressors after ROSC was cannulated to VA ECMO (LFV, RFA, no anterograde perfusion catheter) on 11/25. Notably CTH that day revealed small subdural hemorrhage.     His post arrest TTE on 11/26 showed a signficantly worse LVEF of 5-10% with an AV that was only minimally opening. Since then he has had improvement in his LV function (now ~35-40%) and improved pulsatility while tolerating progressive slow ECMO weans. ECMO turndown (note in chart 11/30) was reassuring for ability to decannulate to IABP/inotropes. LHC 12/06 showed closure of his 3 vein grafts with graft to LAD patent though with distal native disease. IABP placed, ECMO successfully decannulated 12/08 (transfused 2u pRBCs during). His ARDS is improving. Currently on CVVH.    Previous cardiac studies:  LHC (12/06/22) - patent LIMA-LAD, RCA , LCx , aortogram w/ closure of 3 vein grafts, LVEDP 22 mm Hg, left-to-left and left-to-right collaterals  TTE (12/03/22) - mod-to-sev segmental LVSD (inferolateral, inferior, inferoseptal hypokinesis)    Hemodynamics:  12/12 IABP 1:1, aDBP 103, aMAP 84, CVP 7, lactate 1.3, mVO2 79.3%, CO 10.5, CI 5.0,   12/09 IABP 1:1, aDBP 126, aMAP 92, CVP 6, lactate 1.2  12/08 IABP 1:1, aDBP 120, aMAP 87, CVP 5  12/07 IABP 1:1 ECMO 3600 RPM 4.0 LPM, aMAP 90, aDBP 123, mVO2 66.8%, CVP 7, CO 6.8, CI 3.3 61 YO M with a history of CAD s/p 4v CABG ~2019 in [facility], DM2 (A1c 7.3%), and HTN who initially presented to OSH with abdominal pain and n/v and found to have pancreatitis with lipase > 3000 though also with elevated troponins. CT abdomen revealed acute pancreatitis and his initial LVEF was 40-45%. He developed MSOF with hypoxic respiratory failure requiring intubation and ERIC and went into hypoxic PEA arrest requiring ACLS and due to persistent hypoxia and hypotension on pressors after ROSC was cannulated to VA ECMO (LFV, RFA, no anterograde perfusion catheter) on 11/25. Notably CTH that day revealed small subdural hemorrhage.     His post arrest TTE on 11/26 showed a signficantly worse LVEF of 5-10% with an AV that was only minimally opening. Since then he has had improvement in his LV function (now ~35-40%) and improved pulsatility while tolerating progressive slow ECMO weans. ECMO turndown (note in chart 11/30) was reassuring for ability to decannulate to IABP/inotropes. LHC 12/06 showed closure of his 3 vein grafts with graft to LAD patent though with distal native disease. IABP placed, ECMO successfully decannulated 12/08 (transfused 2u pRBCs during). His ARDS is improving. Currently on CVVH.    Previous cardiac studies:  LHC (12/06/22) - patent LIMA-LAD, RCA , LCx , aortogram w/ closure of 3 vein grafts, LVEDP 22 mm Hg, left-to-left and left-to-right collaterals  TTE (12/03/22) - mod-to-sev segmental LVSD (inferolateral, inferior, inferoseptal hypokinesis)    Hemodynamics:  12/12 IABP 1:1, aDBP 103, aMAP 84, CVP 7, lactate 1.3, mVO2 79.3%, CO 10.5, CI 5.0,   12/09 IABP 1:1, aDBP 126, aMAP 92, CVP 6, lactate 1.2  12/08 IABP 1:1, aDBP 120, aMAP 87, CVP 5  12/07 IABP 1:1 ECMO 3600 RPM 4.0 LPM, aMAP 90, aDBP 123, mVO2 66.8%, CVP 7, CO 6.8, CI 3.3

## 2022-12-12 NOTE — PROGRESS NOTE ADULT - ATTENDING COMMENTS
ERIC ATN oliguria  Cardiogenic shock  s/p ECMO decan last week  Continue CRRT  UF to net negative as tolerated    Melissa Mercer MD  Off: [phone]  contact me on teams    (After 5 pm or on weekends please page the on-call fellow/attending, can check [email or web address] for schedule. Login is carmen cantor, schedule under [facility] medicine, psych, derm) ERIC ATN oliguria  Cardiogenic shock  s/p ECMO decan last week  Continue CRRT  UF to net negative as tolerated    Melissa Mercer MD  Off: [phone]  contact me on teams    (After 5 pm or on weekends please page the on-call fellow/attending, can check [email or web address] for schedule. Login is carmen cantor, schedule under [facility] medicine, psych, derm) ERIC ATN oliguria  Cardiogenic shock  s/p ECMO decan last week  Continue CRRT  UF to net negative as tolerated    Melissa Mercer MD  Off: [phone]  contact me on teams    (After 5 pm or on weekends please page the on-call fellow/attending, can check [email or web address] for schedule. Login is carmen cantor, schedule under [facility] medicine, psych, derm)

## 2022-12-12 NOTE — PROGRESS NOTE ADULT - SUBJECTIVE AND OBJECTIVE BOX
CARDIOLOGY CONSULT PROGRESS NOTE  EDUARDO REAL  MRN-[id number]    INTERVAL EVENTS:  - tele:   -     ROS:  Negative, except as above.    MEDICATIONS  (STANDING):  aMIOdarone    Tablet   Oral   aMIOdarone    Tablet 400 milliGRAM(s) Oral every 8 hours  aMIOdarone Infusion 0.5 mG/Min (16.7 mL/Hr) IV Continuous <Continuous>  atorvastatin 40 milliGRAM(s) Oral at bedtime  chlorhexidine 0.12% Liquid 15 milliLiter(s) Oral Mucosa every 12 hours  chlorhexidine 2% Cloths 1 Application(s) Topical <User Schedule>  CRRT Treatment    <Continuous>  fluconAZOLE IVPB      fluconAZOLE IVPB 200 milliGRAM(s) IV Intermittent every 24 hours  heparin  Infusion 800 Unit(s)/Hr (11 mL/Hr) IV Continuous <Continuous>  insulin lispro (ADMELOG) corrective regimen sliding scale   SubCutaneous every 4 hours  meropenem  IVPB 1000 milliGRAM(s) IV Intermittent every 12 hours  milrinone Infusion 0.1 MICROgram(s)/kG/Min (2.79 mL/Hr) IV Continuous <Continuous>  pantoprazole  Injectable 40 milliGRAM(s) IV Push two times a day  Phoxillum Filtration BK 4 / 2.5 5000 milliLiter(s) (1000 mL/Hr) CRRT <Continuous>  polyethylene glycol 3350 17 Gram(s) Oral two times a day  PrismaSATE Dialysate BK 0 / 3.5 5000 milliLiter(s) (2000 mL/Hr) CRRT <Continuous>  PrismaSOL Filtration BGK 4 / 2.5 5000 milliLiter(s) (200 mL/Hr) CRRT <Continuous>  senna 2 Tablet(s) Oral at bedtime  sodium chloride 0.9%. 1000 milliLiter(s) (5 mL/Hr) IV Continuous <Continuous>  vancomycin  IVPB 1000 milliGRAM(s) IV Intermittent every 24 hours  vasopressin Infusion 0.033 Unit(s)/Min (5 mL/Hr) IV Continuous <Continuous>    Allergies  No Known Allergies    P/E:  Adult Advanced Hemodynamics Last 24 Hrs  CVP(mm Hg): 12 (12 Dec 2022 06:45) (-2 - 19)    Vital Signs Last 24 Hrs  T(C): 37.5 (12 Dec 2022 04:00), Max: 38.2 (12 Dec 2022 01:45)  T(F): 99.5 (12 Dec 2022 04:00), Max: 100.8 (12 Dec 2022 01:45)  HR: 120 (12 Dec 2022 06:45) (75 - 170)  RR: 16 (12 Dec 2022 06:45) (8 - 32)  SpO2: 100% (12 Dec 2022 06:45) (96% - 100%)    Patient On (Oxygen Delivery Method): ventilator  O2 Flow (L/min): 40    Daily Weight in k.6 (12 Dec 2022 00:00)    I&O's Detail  11 Dec 2022 07:01  -  12 Dec 2022 07:00  IN:    Amiodarone: 217.1 mL    Amiodarone: 33.4 mL    Argatroban: 3.4 mL    Dexmedetomidine: 218.6 mL    Enteral Tube Flush: 90 mL    Glucerna: 760 mL    Heparin: 183 mL    Insulin: 44.5 mL    IV PiggyBack: 850 mL    Milrinone: 61.6 mL    Milrinone: 11.2 mL    Norepinephrine: 7 mL    PRBCs (Packed Red Blood Cells): 300 mL    sodium chloride 0.9%: 180 mL    Vasopressin: 99 mL  Total IN: 3058.8 mL  OUT:    Indwelling Catheter - Urethral (mL): 95 mL    Other (mL): 3017 mL  Total OUT: 3112 mL  Total NET: -53.2 mL    I&O's Summary  11 Dec 2022 07:01  -  12 Dec 2022 07:00  --------------------------------------------------------  IN: 3058.8 mL / OUT: 3112 mL / NET: -53.2 mL    Gen: intubated  laying in hospital bed, rousable and follows commands  HEENT: NCAT, ETT in place, MMM  CV: tachycardic; systolic murmur, S1/S2  Resp: mechanical BS  Abd: soft, NT, hypoactive BS  Ext:  WWP, PPP, IABP L groin, wound vac R groin    RELEVANT RECENT LABS/IMAGING/STUDIES:                        9.2    18.07 )-----------( 142      ( 12 Dec 2022 00:41 )             28.2     1212    134<L>  |  99  |  25<H>  ----------------------------<  100<H>  3.8   |  20<L>  |  1.55<H>    Ca    8.3<L>      12 Dec 2022 00:41  Phos  3.4     12  Mg     2.1         TPro  6.2  /  Alb  2.8<L>  /  TBili  1.2  /  DBili  x   /  AST  44<H>  /  ALT  18  /  AlkPhos  134<H>      LIVER FUNCTIONS - ( 12 Dec 2022 00:41 )  Alb: 2.8 g/dL / Pro: 6.2 g/dL / ALK PHOS: 134 U/L / ALT: 18 U/L / AST: 44 U/L / GGT: x           PT/INR - ( 12 Dec 2022 00:41 )   PT: 13.7 sec;   INR: 1.18 ratio      PTT - ( 12 Dec 2022 00:41 )  PTT:30.7 sec    ABG - ( 12 Dec 2022 00:22 )  pH, Arterial: 7.48  pH, Blood: x     /  pCO2: 29    /  pO2: 125   / HCO3: 22    / Base Excess: -1.3  /  SaO2: 98.6      Culture - Blood (collected 10 Dec 2022 15:45)  Source: .Blood Blood-Peripheral  Preliminary Report (11 Dec 2022 22:02):    No growth to date.    Culture - Blood (collected 10 Dec 2022 15:30)  Source: .Blood Blood-Peripheral  Preliminary Report (11 Dec 2022 22:02):    No growth to date.    Culture - Sputum (collected 10 Dec 2022 10:49)  Source: ET Tube ET Tube  Gram Stain (10 Dec 2022 23:07):    Moderate polymorphonuclear leukocytes per low power field    No Squamous epithelial cells per low power field    Few Yeast like cells per oil power field  Preliminary Report (11 Dec 2022 18:53):    Normal Respiratory Christy present CARDIOLOGY CONSULT PROGRESS NOTE  EDUARDO REAL  [id number]    INTERVAL EVENTS:  - tele:   -     ROS:  Negative, except as above.    MEDICATIONS  (STANDING):  aMIOdarone    Tablet   Oral   aMIOdarone    Tablet 400 milliGRAM(s) Oral every 8 hours  aMIOdarone Infusion 0.5 mG/Min (16.7 mL/Hr) IV Continuous <Continuous>  atorvastatin 40 milliGRAM(s) Oral at bedtime  chlorhexidine 0.12% Liquid 15 milliLiter(s) Oral Mucosa every 12 hours  chlorhexidine 2% Cloths 1 Application(s) Topical <User Schedule>  CRRT Treatment    <Continuous>  fluconAZOLE IVPB      fluconAZOLE IVPB 200 milliGRAM(s) IV Intermittent every 24 hours  heparin  Infusion 800 Unit(s)/Hr (11 mL/Hr) IV Continuous <Continuous>  insulin lispro (ADMELOG) corrective regimen sliding scale   SubCutaneous every 4 hours  meropenem  IVPB 1000 milliGRAM(s) IV Intermittent every 12 hours  milrinone Infusion 0.1 MICROgram(s)/kG/Min (2.79 mL/Hr) IV Continuous <Continuous>  pantoprazole  Injectable 40 milliGRAM(s) IV Push two times a day  Phoxillum Filtration BK 4 / 2.5 5000 milliLiter(s) (1000 mL/Hr) CRRT <Continuous>  polyethylene glycol 3350 17 Gram(s) Oral two times a day  PrismaSATE Dialysate BK 0 / 3.5 5000 milliLiter(s) (2000 mL/Hr) CRRT <Continuous>  PrismaSOL Filtration BGK 4 / 2.5 5000 milliLiter(s) (200 mL/Hr) CRRT <Continuous>  senna 2 Tablet(s) Oral at bedtime  sodium chloride 0.9%. 1000 milliLiter(s) (5 mL/Hr) IV Continuous <Continuous>  vancomycin  IVPB 1000 milliGRAM(s) IV Intermittent every 24 hours  vasopressin Infusion 0.033 Unit(s)/Min (5 mL/Hr) IV Continuous <Continuous>    Allergies  No Known Allergies    P/E:  Adult Advanced Hemodynamics Last 24 Hrs  CVP(mm Hg): 12 (12 Dec 2022 06:45) (-2 - 19)    Vital Signs Last 24 Hrs  T(C): 37.5 (12 Dec 2022 04:00), Max: 38.2 (12 Dec 2022 01:45)  T(F): 99.5 (12 Dec 2022 04:00), Max: 100.8 (12 Dec 2022 01:45)  HR: 120 (12 Dec 2022 06:45) (75 - 170)  RR: 16 (12 Dec 2022 06:45) (8 - 32)  SpO2: 100% (12 Dec 2022 06:45) (96% - 100%)    Patient On (Oxygen Delivery Method): ventilator  O2 Flow (L/min): 40    Daily Weight in k.6 (12 Dec 2022 00:00)    I&O's Detail  11 Dec 2022 07:01  -  12 Dec 2022 07:00  IN:    Amiodarone: 217.1 mL    Amiodarone: 33.4 mL    Argatroban: 3.4 mL    Dexmedetomidine: 218.6 mL    Enteral Tube Flush: 90 mL    Glucerna: 760 mL    Heparin: 183 mL    Insulin: 44.5 mL    IV PiggyBack: 850 mL    Milrinone: 61.6 mL    Milrinone: 11.2 mL    Norepinephrine: 7 mL    PRBCs (Packed Red Blood Cells): 300 mL    sodium chloride 0.9%: 180 mL    Vasopressin: 99 mL  Total IN: 3058.8 mL  OUT:    Indwelling Catheter - Urethral (mL): 95 mL    Other (mL): 3017 mL  Total OUT: 3112 mL  Total NET: -53.2 mL    I&O's Summary  11 Dec 2022 07:01  -  12 Dec 2022 07:00  --------------------------------------------------------  IN: 3058.8 mL / OUT: 3112 mL / NET: -53.2 mL    Gen: intubated  laying in hospital bed, rousable and follows commands  HEENT: NCAT, ETT in place, MMM  CV: tachycardic; systolic murmur, S1/S2  Resp: mechanical BS  Abd: soft, NT, hypoactive BS  Ext:  WWP, PPP, IABP L groin, wound vac R groin    RELEVANT RECENT LABS/IMAGING/STUDIES:                        9.2    18.07 )-----------( 142      ( 12 Dec 2022 00:41 )             28.2     1212    134<L>  |  99  |  25<H>  ----------------------------<  100<H>  3.8   |  20<L>  |  1.55<H>    Ca    8.3<L>      12 Dec 2022 00:41  Phos  3.4     12  Mg     2.1         TPro  6.2  /  Alb  2.8<L>  /  TBili  1.2  /  DBili  x   /  AST  44<H>  /  ALT  18  /  AlkPhos  134<H>      LIVER FUNCTIONS - ( 12 Dec 2022 00:41 )  Alb: 2.8 g/dL / Pro: 6.2 g/dL / ALK PHOS: 134 U/L / ALT: 18 U/L / AST: 44 U/L / GGT: x           PT/INR - ( 12 Dec 2022 00:41 )   PT: 13.7 sec;   INR: 1.18 ratio      PTT - ( 12 Dec 2022 00:41 )  PTT:30.7 sec    ABG - ( 12 Dec 2022 00:22 )  pH, Arterial: 7.48  pH, Blood: x     /  pCO2: 29    /  pO2: 125   / HCO3: 22    / Base Excess: -1.3  /  SaO2: 98.6      Culture - Blood (collected 10 Dec 2022 15:45)  Source: .Blood Blood-Peripheral  Preliminary Report (11 Dec 2022 22:02):    No growth to date.    Culture - Blood (collected 10 Dec 2022 15:30)  Source: .Blood Blood-Peripheral  Preliminary Report (11 Dec 2022 22:02):    No growth to date.    Culture - Sputum (collected 10 Dec 2022 10:49)  Source: ET Tube ET Tube  Gram Stain (10 Dec 2022 23:07):    Moderate polymorphonuclear leukocytes per low power field    No Squamous epithelial cells per low power field    Few Yeast like cells per oil power field  Preliminary Report (11 Dec 2022 18:53):    Normal Respiratory Christy present CARDIOLOGY CONSULT PROGRESS NOTE  EDUARDO REAL  [id number]    INTERVAL EVENTS:  - tele:   -     ROS:  Negative, except as above.    MEDICATIONS  (STANDING):  aMIOdarone    Tablet   Oral   aMIOdarone    Tablet 400 milliGRAM(s) Oral every 8 hours  aMIOdarone Infusion 0.5 mG/Min (16.7 mL/Hr) IV Continuous <Continuous>  atorvastatin 40 milliGRAM(s) Oral at bedtime  chlorhexidine 0.12% Liquid 15 milliLiter(s) Oral Mucosa every 12 hours  chlorhexidine 2% Cloths 1 Application(s) Topical <User Schedule>  CRRT Treatment    <Continuous>  fluconAZOLE IVPB      fluconAZOLE IVPB 200 milliGRAM(s) IV Intermittent every 24 hours  heparin  Infusion 800 Unit(s)/Hr (11 mL/Hr) IV Continuous <Continuous>  insulin lispro (ADMELOG) corrective regimen sliding scale   SubCutaneous every 4 hours  meropenem  IVPB 1000 milliGRAM(s) IV Intermittent every 12 hours  milrinone Infusion 0.1 MICROgram(s)/kG/Min (2.79 mL/Hr) IV Continuous <Continuous>  pantoprazole  Injectable 40 milliGRAM(s) IV Push two times a day  Phoxillum Filtration BK 4 / 2.5 5000 milliLiter(s) (1000 mL/Hr) CRRT <Continuous>  polyethylene glycol 3350 17 Gram(s) Oral two times a day  PrismaSATE Dialysate BK 0 / 3.5 5000 milliLiter(s) (2000 mL/Hr) CRRT <Continuous>  PrismaSOL Filtration BGK 4 / 2.5 5000 milliLiter(s) (200 mL/Hr) CRRT <Continuous>  senna 2 Tablet(s) Oral at bedtime  sodium chloride 0.9%. 1000 milliLiter(s) (5 mL/Hr) IV Continuous <Continuous>  vancomycin  IVPB 1000 milliGRAM(s) IV Intermittent every 24 hours  vasopressin Infusion 0.033 Unit(s)/Min (5 mL/Hr) IV Continuous <Continuous>    Allergies  No Known Allergies    P/E:  Adult Advanced Hemodynamics Last 24 Hrs  CVP(mm Hg): 12 (12 Dec 2022 06:45) (-2 - 19)    Vital Signs Last 24 Hrs  T(C): 37.5 (12 Dec 2022 04:00), Max: 38.2 (12 Dec 2022 01:45)  T(F): 99.5 (12 Dec 2022 04:00), Max: 100.8 (12 Dec 2022 01:45)  HR: 120 (12 Dec 2022 06:45) (75 - 170)  RR: 16 (12 Dec 2022 06:45) (8 - 32)  SpO2: 100% (12 Dec 2022 06:45) (96% - 100%)    Patient On (Oxygen Delivery Method): ventilator  O2 Flow (L/min): 40    Daily Weight in k.6 (12 Dec 2022 00:00)    I&O's Detail  11 Dec 2022 07:01  -  12 Dec 2022 07:00  IN:    Amiodarone: 217.1 mL    Amiodarone: 33.4 mL    Argatroban: 3.4 mL    Dexmedetomidine: 218.6 mL    Enteral Tube Flush: 90 mL    Glucerna: 760 mL    Heparin: 183 mL    Insulin: 44.5 mL    IV PiggyBack: 850 mL    Milrinone: 61.6 mL    Milrinone: 11.2 mL    Norepinephrine: 7 mL    PRBCs (Packed Red Blood Cells): 300 mL    sodium chloride 0.9%: 180 mL    Vasopressin: 99 mL  Total IN: 3058.8 mL  OUT:    Indwelling Catheter - Urethral (mL): 95 mL    Other (mL): 3017 mL  Total OUT: 3112 mL  Total NET: -53.2 mL    I&O's Summary  11 Dec 2022 07:01  -  12 Dec 2022 07:00  --------------------------------------------------------  IN: 3058.8 mL / OUT: 3112 mL / NET: -53.2 mL    Gen: intubated  laying in hospital bed, rousable and follows commands  HEENT: NCAT, ETT in place, MMM  CV: tachycardic; systolic murmur, S1/S2  Resp: mechanical BS  Abd: soft, NT, hypoactive BS  Ext:  WWP, PPP, IABP L groin, wound vac R groin    RELEVANT RECENT LABS/IMAGING/STUDIES:                        9.2    18.07 )-----------( 142      ( 12 Dec 2022 00:41 )             28.2     1212    134<L>  |  99  |  25<H>  ----------------------------<  100<H>  3.8   |  20<L>  |  1.55<H>    Ca    8.3<L>      12 Dec 2022 00:41  Phos  3.4     12  Mg     2.1         TPro  6.2  /  Alb  2.8<L>  /  TBili  1.2  /  DBili  x   /  AST  44<H>  /  ALT  18  /  AlkPhos  134<H>      LIVER FUNCTIONS - ( 12 Dec 2022 00:41 )  Alb: 2.8 g/dL / Pro: 6.2 g/dL / ALK PHOS: 134 U/L / ALT: 18 U/L / AST: 44 U/L / GGT: x           PT/INR - ( 12 Dec 2022 00:41 )   PT: 13.7 sec;   INR: 1.18 ratio      PTT - ( 12 Dec 2022 00:41 )  PTT:30.7 sec    ABG - ( 12 Dec 2022 00:22 )  pH, Arterial: 7.48  pH, Blood: x     /  pCO2: 29    /  pO2: 125   / HCO3: 22    / Base Excess: -1.3  /  SaO2: 98.6      Culture - Blood (collected 10 Dec 2022 15:45)  Source: .Blood Blood-Peripheral  Preliminary Report (11 Dec 2022 22:02):    No growth to date.    Culture - Blood (collected 10 Dec 2022 15:30)  Source: .Blood Blood-Peripheral  Preliminary Report (11 Dec 2022 22:02):    No growth to date.    Culture - Sputum (collected 10 Dec 2022 10:49)  Source: ET Tube ET Tube  Gram Stain (10 Dec 2022 23:07):    Moderate polymorphonuclear leukocytes per low power field    No Squamous epithelial cells per low power field    Few Yeast like cells per oil power field  Preliminary Report (11 Dec 2022 18:53):    Normal Respiratory Christy present CARDIOLOGY CONSULT PROGRESS NOTE  EDUARDO REAL  [id number]    INTERVAL EVENTS:  - tele: NSR 80-120s, intermittently w/ RBBB  - intubated/sedated, stroke code called overnight due to R gaze preference, initial imaging w/o acute insult    ROS:  Negative, except as above.    MEDICATIONS  (STANDING):  aMIOdarone    Tablet   Oral   aMIOdarone    Tablet 400 milliGRAM(s) Oral every 8 hours  aMIOdarone Infusion 0.5 mG/Min (16.7 mL/Hr) IV Continuous <Continuous>  atorvastatin 40 milliGRAM(s) Oral at bedtime  chlorhexidine 0.12% Liquid 15 milliLiter(s) Oral Mucosa every 12 hours  chlorhexidine 2% Cloths 1 Application(s) Topical <User Schedule>  CRRT Treatment    <Continuous>  fluconAZOLE IVPB      fluconAZOLE IVPB 200 milliGRAM(s) IV Intermittent every 24 hours  heparin  Infusion 800 Unit(s)/Hr (11 mL/Hr) IV Continuous <Continuous>  insulin lispro (ADMELOG) corrective regimen sliding scale   SubCutaneous every 4 hours  meropenem  IVPB 1000 milliGRAM(s) IV Intermittent every 12 hours  milrinone Infusion 0.1 MICROgram(s)/kG/Min (2.79 mL/Hr) IV Continuous <Continuous>  pantoprazole  Injectable 40 milliGRAM(s) IV Push two times a day  Phoxillum Filtration BK 4 / 2.5 5000 milliLiter(s) (1000 mL/Hr) CRRT <Continuous>  polyethylene glycol 3350 17 Gram(s) Oral two times a day  PrismaSATE Dialysate BK 0 / 3.5 5000 milliLiter(s) (2000 mL/Hr) CRRT <Continuous>  PrismaSOL Filtration BGK 4 / 2.5 5000 milliLiter(s) (200 mL/Hr) CRRT <Continuous>  senna 2 Tablet(s) Oral at bedtime  sodium chloride 0.9%. 1000 milliLiter(s) (5 mL/Hr) IV Continuous <Continuous>  vancomycin  IVPB 1000 milliGRAM(s) IV Intermittent every 24 hours  vasopressin Infusion 0.033 Unit(s)/Min (5 mL/Hr) IV Continuous <Continuous>    Allergies  No Known Allergies    P/E:  Adult Advanced Hemodynamics Last 24 Hrs  CVP(mm Hg): 12 (12 Dec 2022 06:45) (-2 - 19)    Vital Signs Last 24 Hrs  T(C): 37.5 (12 Dec 2022 04:00), Max: 38.2 (12 Dec 2022 01:45)  T(F): 99.5 (12 Dec 2022 04:00), Max: 100.8 (12 Dec 2022 01:45)  HR: 120 (12 Dec 2022 06:45) (75 - 170)  RR: 16 (12 Dec 2022 06:45) (8 - 32)  SpO2: 100% (12 Dec 2022 06:45) (96% - 100%)    Patient On (Oxygen Delivery Method): ventilator  O2 Flow (L/min): 40    Daily Weight in k.6 (12 Dec 2022 00:00)    I&O's Detail  11 Dec 2022 07:01  -  12 Dec 2022 07:00  IN:    Amiodarone: 217.1 mL    Amiodarone: 33.4 mL    Argatroban: 3.4 mL    Dexmedetomidine: 218.6 mL    Enteral Tube Flush: 90 mL    Glucerna: 760 mL    Heparin: 183 mL    Insulin: 44.5 mL    IV PiggyBack: 850 mL    Milrinone: 61.6 mL    Milrinone: 11.2 mL    Norepinephrine: 7 mL    PRBCs (Packed Red Blood Cells): 300 mL    sodium chloride 0.9%: 180 mL    Vasopressin: 99 mL  Total IN: 3058.8 mL  OUT:    Indwelling Catheter - Urethral (mL): 95 mL    Other (mL): 3017 mL  Total OUT: 3112 mL  Total NET: -53.2 mL    I&O's Summary  11 Dec 2022 07:01  -  12 Dec 2022 07:00  --------------------------------------------------------  IN: 3058.8 mL / OUT: 3112 mL / NET: -53.2 mL    Gen: intubated  laying in hospital bed, rousable and follows commands  HEENT: NCAT, ETT in place, MMM  CV: tachycardic; systolic murmur, S1/S2  Resp: mechanical BS  Abd: soft, NT, hypoactive BS  Ext:  WWP, PPP, IABP L groin, wound vac R groin    RELEVANT RECENT LABS/IMAGING/STUDIES:                        9.2    18.07 )-----------( 142      ( 12 Dec 2022 00:41 )             28.2         134<L>  |  99  |  25<H>  ----------------------------<  100<H>  3.8   |  20<L>  |  1.55<H>    Ca    8.3<L>      12 Dec 2022 00:41  Phos  3.4       Mg     2.1         TPro  6.2  /  Alb  2.8<L>  /  TBili  1.2  /  DBili  x   /  AST  44<H>  /  ALT  18  /  AlkPhos  134<H>      LIVER FUNCTIONS - ( 12 Dec 2022 00:41 )  Alb: 2.8 g/dL / Pro: 6.2 g/dL / ALK PHOS: 134 U/L / ALT: 18 U/L / AST: 44 U/L / GGT: x           PT/INR - ( 12 Dec 2022 00:41 )   PT: 13.7 sec;   INR: 1.18 ratio      PTT - ( 12 Dec 2022 00:41 )  PTT:30.7 sec    ABG - ( 12 Dec 2022 00:22 )  pH, Arterial: 7.48  pH, Blood: x     /  pCO2: 29    /  pO2: 125   / HCO3: 22    / Base Excess: -1.3  /  SaO2: 98.6      Culture - Blood (collected 10 Dec 2022 15:45)  Source: .Blood Blood-Peripheral  Preliminary Report (11 Dec 2022 22:02):    No growth to date.    Culture - Blood (collected 10 Dec 2022 15:30)  Source: .Blood Blood-Peripheral  Preliminary Report (11 Dec 2022 22:02):    No growth to date.    Culture - Sputum (collected 10 Dec 2022 10:49)  Source: ET Tube ET Tube  Gram Stain (10 Dec 2022 23:07):    Moderate polymorphonuclear leukocytes per low power field    No Squamous epithelial cells per low power field    Few Yeast like cells per oil power field  Preliminary Report (11 Dec 2022 18:53):    Normal Respiratory Christy present CARDIOLOGY CONSULT PROGRESS NOTE  EDUARDO REAL  [id number]    INTERVAL EVENTS:  - tele: NSR 80-120s, intermittently w/ RBBB  - intubated/sedated, stroke code called overnight due to R gaze preference, initial imaging w/o acute insult    ROS:  Negative, except as above.    MEDICATIONS  (STANDING):  aMIOdarone    Tablet   Oral   aMIOdarone    Tablet 400 milliGRAM(s) Oral every 8 hours  aMIOdarone Infusion 0.5 mG/Min (16.7 mL/Hr) IV Continuous <Continuous>  atorvastatin 40 milliGRAM(s) Oral at bedtime  chlorhexidine 0.12% Liquid 15 milliLiter(s) Oral Mucosa every 12 hours  chlorhexidine 2% Cloths 1 Application(s) Topical <User Schedule>  CRRT Treatment    <Continuous>  fluconAZOLE IVPB      fluconAZOLE IVPB 200 milliGRAM(s) IV Intermittent every 24 hours  heparin  Infusion 800 Unit(s)/Hr (11 mL/Hr) IV Continuous <Continuous>  insulin lispro (ADMELOG) corrective regimen sliding scale   SubCutaneous every 4 hours  meropenem  IVPB 1000 milliGRAM(s) IV Intermittent every 12 hours  milrinone Infusion 0.1 MICROgram(s)/kG/Min (2.79 mL/Hr) IV Continuous <Continuous>  pantoprazole  Injectable 40 milliGRAM(s) IV Push two times a day  Phoxillum Filtration BK 4 / 2.5 5000 milliLiter(s) (1000 mL/Hr) CRRT <Continuous>  polyethylene glycol 3350 17 Gram(s) Oral two times a day  PrismaSATE Dialysate BK 0 / 3.5 5000 milliLiter(s) (2000 mL/Hr) CRRT <Continuous>  PrismaSOL Filtration BGK 4 / 2.5 5000 milliLiter(s) (200 mL/Hr) CRRT <Continuous>  senna 2 Tablet(s) Oral at bedtime  sodium chloride 0.9%. 1000 milliLiter(s) (5 mL/Hr) IV Continuous <Continuous>  vancomycin  IVPB 1000 milliGRAM(s) IV Intermittent every 24 hours  vasopressin Infusion 0.033 Unit(s)/Min (5 mL/Hr) IV Continuous <Continuous>    Allergies  No Known Allergies    P/E:  Adult Advanced Hemodynamics Last 24 Hrs  CVP(mm Hg): 12 (12 Dec 2022 06:45) (-2 - 19)    Vital Signs Last 24 Hrs  T(C): 37.5 (12 Dec 2022 04:00), Max: 38.2 (12 Dec 2022 01:45)  T(F): 99.5 (12 Dec 2022 04:00), Max: 100.8 (12 Dec 2022 01:45)  HR: 120 (12 Dec 2022 06:45) (75 - 170)  RR: 16 (12 Dec 2022 06:45) (8 - 32)  SpO2: 100% (12 Dec 2022 06:45) (96% - 100%)    Patient On (Oxygen Delivery Method): ventilator  O2 Flow (L/min): 40    Daily Weight in k.6 (12 Dec 2022 00:00)    I&O's Detail  11 Dec 2022 07:01  -  12 Dec 2022 07:00  IN:    Amiodarone: 217.1 mL    Amiodarone: 33.4 mL    Argatroban: 3.4 mL    Dexmedetomidine: 218.6 mL    Enteral Tube Flush: 90 mL    Glucerna: 760 mL    Heparin: 183 mL    Insulin: 44.5 mL    IV PiggyBack: 850 mL    Milrinone: 61.6 mL    Milrinone: 11.2 mL    Norepinephrine: 7 mL    PRBCs (Packed Red Blood Cells): 300 mL    sodium chloride 0.9%: 180 mL    Vasopressin: 99 mL  Total IN: 3058.8 mL  OUT:    Indwelling Catheter - Urethral (mL): 95 mL    Other (mL): 3017 mL  Total OUT: 3112 mL  Total NET: -53.2 mL    I&O's Summary  11 Dec 2022 07:01  -  12 Dec 2022 07:00  --------------------------------------------------------  IN: 3058.8 mL / OUT: 3112 mL / NET: -53.2 mL    Gen: intubated  laying in hospital bed, rousable and follows commands  HEENT: NCAT, ETT in place, MMM  CV: tachycardic; systolic murmur, S1/S2  Resp: mechanical BS  Abd: soft, NT, hypoactive BS  Ext:  WWP, PPP, IABP L groin, wound vac R groin    RELEVANT RECENT LABS/IMAGING/STUDIES:                        9.2    18.07 )-----------( 142      ( 12 Dec 2022 00:41 )             28.2         134<L>  |  99  |  25<H>  ----------------------------<  100<H>  3.8   |  20<L>  |  1.55<H>    Ca    8.3<L>      12 Dec 2022 00:41  Phos  3.4       Mg     2.1         TPro  6.2  /  Alb  2.8<L>  /  TBili  1.2  /  DBili  x   /  AST  44<H>  /  ALT  18  /  AlkPhos  134<H>      LIVER FUNCTIONS - ( 12 Dec 2022 00:41 )  Alb: 2.8 g/dL / Pro: 6.2 g/dL / ALK PHOS: 134 U/L / ALT: 18 U/L / AST: 44 U/L / GGT: x           PT/INR - ( 12 Dec 2022 00:41 )   PT: 13.7 sec;   INR: 1.18 ratio      PTT - ( 12 Dec 2022 00:41 )  PTT:30.7 sec    ABG - ( 12 Dec 2022 00:22 )  pH, Arterial: 7.48  pH, Blood: x     /  pCO2: 29    /  pO2: 125   / HCO3: 22    / Base Excess: -1.3  /  SaO2: 98.6      Culture - Blood (collected 10 Dec 2022 15:45)  Source: .Blood Blood-Peripheral  Preliminary Report (11 Dec 2022 22:02):    No growth to date.    Culture - Blood (collected 10 Dec 2022 15:30)  Source: .Blood Blood-Peripheral  Preliminary Report (11 Dec 2022 22:02):    No growth to date.    Culture - Sputum (collected 10 Dec 2022 10:49)  Source: ET Tube ET Tube  Gram Stain (10 Dec 2022 23:07):    Moderate polymorphonuclear leukocytes per low power field    No Squamous epithelial cells per low power field    Few Yeast like cells per oil power field  Preliminary Report (11 Dec 2022 18:53):    Normal Respiratory Christy present CARDIOLOGY CONSULT PROGRESS NOTE  EDUARDO REAL  [id number]    INTERVAL EVENTS:  - tele: NSR 80-120s, intermittently w/ RBBB  - intubated/sedated, stroke code called overnight due to R gaze preference, initial imaging w/o acute insult    ROS:  Negative, except as above.    MEDICATIONS  (STANDING):  aMIOdarone    Tablet   Oral   aMIOdarone    Tablet 400 milliGRAM(s) Oral every 8 hours  aMIOdarone Infusion 0.5 mG/Min (16.7 mL/Hr) IV Continuous <Continuous>  atorvastatin 40 milliGRAM(s) Oral at bedtime  chlorhexidine 0.12% Liquid 15 milliLiter(s) Oral Mucosa every 12 hours  chlorhexidine 2% Cloths 1 Application(s) Topical <User Schedule>  CRRT Treatment    <Continuous>  fluconAZOLE IVPB      fluconAZOLE IVPB 200 milliGRAM(s) IV Intermittent every 24 hours  heparin  Infusion 800 Unit(s)/Hr (11 mL/Hr) IV Continuous <Continuous>  insulin lispro (ADMELOG) corrective regimen sliding scale   SubCutaneous every 4 hours  meropenem  IVPB 1000 milliGRAM(s) IV Intermittent every 12 hours  milrinone Infusion 0.1 MICROgram(s)/kG/Min (2.79 mL/Hr) IV Continuous <Continuous>  pantoprazole  Injectable 40 milliGRAM(s) IV Push two times a day  Phoxillum Filtration BK 4 / 2.5 5000 milliLiter(s) (1000 mL/Hr) CRRT <Continuous>  polyethylene glycol 3350 17 Gram(s) Oral two times a day  PrismaSATE Dialysate BK 0 / 3.5 5000 milliLiter(s) (2000 mL/Hr) CRRT <Continuous>  PrismaSOL Filtration BGK 4 / 2.5 5000 milliLiter(s) (200 mL/Hr) CRRT <Continuous>  senna 2 Tablet(s) Oral at bedtime  sodium chloride 0.9%. 1000 milliLiter(s) (5 mL/Hr) IV Continuous <Continuous>  vancomycin  IVPB 1000 milliGRAM(s) IV Intermittent every 24 hours  vasopressin Infusion 0.033 Unit(s)/Min (5 mL/Hr) IV Continuous <Continuous>    Allergies  No Known Allergies    P/E:  Adult Advanced Hemodynamics Last 24 Hrs  CVP(mm Hg): 12 (12 Dec 2022 06:45) (-2 - 19)    Vital Signs Last 24 Hrs  T(C): 37.5 (12 Dec 2022 04:00), Max: 38.2 (12 Dec 2022 01:45)  T(F): 99.5 (12 Dec 2022 04:00), Max: 100.8 (12 Dec 2022 01:45)  HR: 120 (12 Dec 2022 06:45) (75 - 170)  RR: 16 (12 Dec 2022 06:45) (8 - 32)  SpO2: 100% (12 Dec 2022 06:45) (96% - 100%)    Patient On (Oxygen Delivery Method): ventilator  O2 Flow (L/min): 40    Daily Weight in k.6 (12 Dec 2022 00:00)    I&O's Detail  11 Dec 2022 07:01  -  12 Dec 2022 07:00  IN:    Amiodarone: 217.1 mL    Amiodarone: 33.4 mL    Argatroban: 3.4 mL    Dexmedetomidine: 218.6 mL    Enteral Tube Flush: 90 mL    Glucerna: 760 mL    Heparin: 183 mL    Insulin: 44.5 mL    IV PiggyBack: 850 mL    Milrinone: 61.6 mL    Milrinone: 11.2 mL    Norepinephrine: 7 mL    PRBCs (Packed Red Blood Cells): 300 mL    sodium chloride 0.9%: 180 mL    Vasopressin: 99 mL  Total IN: 3058.8 mL  OUT:    Indwelling Catheter - Urethral (mL): 95 mL    Other (mL): 3017 mL  Total OUT: 3112 mL  Total NET: -53.2 mL    I&O's Summary  11 Dec 2022 07:01  -  12 Dec 2022 07:00  --------------------------------------------------------  IN: 3058.8 mL / OUT: 3112 mL / NET: -53.2 mL    Gen: intubated  laying in hospital bed, rousable and follows commands  HEENT: NCAT, ETT in place, MMM  CV: tachycardic; systolic murmur, S1/S2  Resp: mechanical BS  Abd: soft, NT, hypoactive BS  Ext:  WWP, PPP, IABP L groin, wound vac R groin    RELEVANT RECENT LABS/IMAGING/STUDIES:                        9.2    18.07 )-----------( 142      ( 12 Dec 2022 00:41 )             28.2         134<L>  |  99  |  25<H>  ----------------------------<  100<H>  3.8   |  20<L>  |  1.55<H>    Ca    8.3<L>      12 Dec 2022 00:41  Phos  3.4       Mg     2.1         TPro  6.2  /  Alb  2.8<L>  /  TBili  1.2  /  DBili  x   /  AST  44<H>  /  ALT  18  /  AlkPhos  134<H>      LIVER FUNCTIONS - ( 12 Dec 2022 00:41 )  Alb: 2.8 g/dL / Pro: 6.2 g/dL / ALK PHOS: 134 U/L / ALT: 18 U/L / AST: 44 U/L / GGT: x           PT/INR - ( 12 Dec 2022 00:41 )   PT: 13.7 sec;   INR: 1.18 ratio      PTT - ( 12 Dec 2022 00:41 )  PTT:30.7 sec    ABG - ( 12 Dec 2022 00:22 )  pH, Arterial: 7.48  pH, Blood: x     /  pCO2: 29    /  pO2: 125   / HCO3: 22    / Base Excess: -1.3  /  SaO2: 98.6      Culture - Blood (collected 10 Dec 2022 15:45)  Source: .Blood Blood-Peripheral  Preliminary Report (11 Dec 2022 22:02):    No growth to date.    Culture - Blood (collected 10 Dec 2022 15:30)  Source: .Blood Blood-Peripheral  Preliminary Report (11 Dec 2022 22:02):    No growth to date.    Culture - Sputum (collected 10 Dec 2022 10:49)  Source: ET Tube ET Tube  Gram Stain (10 Dec 2022 23:07):    Moderate polymorphonuclear leukocytes per low power field    No Squamous epithelial cells per low power field    Few Yeast like cells per oil power field  Preliminary Report (11 Dec 2022 18:53):    Normal Respiratory Christy present

## 2022-12-12 NOTE — PROGRESS NOTE ADULT - REASON FOR ADMISSION
Acute cholecystitis, gallstone pancreatitis Acute cholecystitis, gallstone pancreatitis, cardiogenic shock

## 2022-12-12 NOTE — PROGRESS NOTE ADULT - PROBLEM SELECTOR PLAN 5
- small 3mm subdural hemorrhage stable on repeat CTs  - okay to continue heparin, appreciate neuro recs  - keep MAP < 75 mmHg and careful monitoring of PTT.

## 2022-12-12 NOTE — PROGRESS NOTE ADULT - SUBJECTIVE AND OBJECTIVE BOX
infectious diseases progress note:    Patient is a 62y old  Male who presents with a chief complaint of Acute cholecystitis, gallstone pancreatitis (12 Dec 2022 08:38)        Acute pancreatitis without infection or necrosis          ROS:  CONSTITUTIONAL:  Negative fever or chills, feels well, good appetite  EYES:  Negative  blurry vision or double vision  CARDIOVASCULAR:  Negative for chest pain or palpitations  RESPIRATORY:  Negative for cough, wheezing, or SOB   GASTROINTESTINAL:  Negative for nausea, vomiting, diarrhea, constipation, or abdominal pain  GENITOURINARY:  Negative frequency, urgency or dysuria  NEUROLOGIC:  No headache, confusion, dizziness, lightheadedness    Allergies    No Known Allergies    Intolerances        ANTIBIOTICS/RELEVANT:  antimicrobials  fluconAZOLE IVPB      fluconAZOLE IVPB 200 milliGRAM(s) IV Intermittent every 24 hours  meropenem  IVPB 1000 milliGRAM(s) IV Intermittent every 12 hours  vancomycin  IVPB 1000 milliGRAM(s) IV Intermittent every 24 hours    immunologic:    OTHER:  aMIOdarone    Tablet   Oral   aMIOdarone    Tablet 400 milliGRAM(s) Oral every 8 hours  aMIOdarone Infusion 0.5 mG/Min IV Continuous <Continuous>  atorvastatin 40 milliGRAM(s) Oral at bedtime  chlorhexidine 0.12% Liquid 15 milliLiter(s) Oral Mucosa every 12 hours  chlorhexidine 2% Cloths 1 Application(s) Topical <User Schedule>  CRRT Treatment    <Continuous>  heparin  Infusion 800 Unit(s)/Hr IV Continuous <Continuous>  insulin lispro (ADMELOG) corrective regimen sliding scale   SubCutaneous every 4 hours  milrinone Infusion 0.1 MICROgram(s)/kG/Min IV Continuous <Continuous>  pantoprazole  Injectable 40 milliGRAM(s) IV Push two times a day  Phoxillum Filtration BK 4 / 2.5 5000 milliLiter(s) CRRT <Continuous>  polyethylene glycol 3350 17 Gram(s) Oral two times a day  PrismaSATE Dialysate BK 0 / 3.5 5000 milliLiter(s) CRRT <Continuous>  PrismaSOL Filtration BGK 4 / 2.5 5000 milliLiter(s) CRRT <Continuous>  senna 2 Tablet(s) Oral at bedtime  sodium chloride 0.9%. 1000 milliLiter(s) IV Continuous <Continuous>  vasopressin Infusion 0.033 Unit(s)/Min IV Continuous <Continuous>      Objective:  Vital Signs Last 24 Hrs  T(C): 36.9 (12 Dec 2022 08:00), Max: 38.2 (12 Dec 2022 01:45)  T(F): 98.4 (12 Dec 2022 08:00), Max: 100.8 (12 Dec 2022 01:45)  HR: 109 (12 Dec 2022 08:15) (75 - 170)  BP: --  BP(mean): --  RR: 23 (12 Dec 2022 08:15) (8 - 32)  SpO2: 100% (12 Dec 2022 08:15) (96% - 100%)    Parameters below as of 12 Dec 2022 08:00  Patient On (Oxygen Delivery Method): ventilator    O2 Concentration (%): 40      Eyes:INOCENCIO, EOMI  Ear/Nose/Throat: no oral lesion, no sinus tenderness on percussion	  Neck:no JVD, no lymphadenopathy, supple  Respiratory: CTA lore  Cardiovascular: S1S2 RRR, no murmurs  Gastrointestinal:soft, (+) BS, no HSM  Extremities:no e/e/c        LABS:                        9.2    18.07 )-----------( 142      ( 12 Dec 2022 00:41 )             28.2     12-    134<L>  |  99  |  25<H>  ----------------------------<  100<H>  3.8   |  20<L>  |  1.55<H>    Ca    8.3<L>      12 Dec 2022 00:41  Phos  3.4     12  Mg     2.1     12-12    TPro  6.2  /  Alb  2.8<L>  /  TBili  1.2  /  DBili  x   /  AST  44<H>  /  ALT  18  /  AlkPhos  134<H>  12-12    PT/INR - ( 12 Dec 2022 00:41 )   PT: 13.7 sec;   INR: 1.18 ratio         PTT - ( 12 Dec 2022 00:41 )  PTT:30.7 sec  Urinalysis Basic - ( 10 Dec 2022 20:01 )    Color: Brown / Appearance: Slightly Turbid / S.020 / pH: x  Gluc: x / Ketone: Trace  / Bili: Moderate / Urobili: Negative   Blood: x / Protein: 300 mg/dL / Nitrite: Positive   Leuk Esterase: Large / RBC: >50 /hpf / WBC >50 /HPF   Sq Epi: x / Non Sq Epi: 3 / Bacteria: Moderate          MICROBIOLOGY:    RECENT CULTURES:  12-10 @ 15:45 .Blood Blood-Peripheral                No growth to date.    12-10 @ 15:30 .Blood Blood-Peripheral                No growth to date.    12-10 @ 10:49 ET Tube ET Tube       Moderate polymorphonuclear leukocytes per low power field  No Squamous epithelial cells per low power field  Few Yeast like cells per oil power field           Normal Respiratory Christy present     @ 04:58 ET Tube ET Tube       Numerous polymorphonuclear leukocytes per low power field  Moderate Squamous epithelial cells per low power field  Few Yeast like cells  per oil power field           Normal Respiratory Christy present     @ 23:45 .Blood Blood-Peripheral                No growth to date.     @ 23:44 .Blood Blood-Peripheral                No growth to date.          RESPIRATORY CULTURES:              RADIOLOGY & ADDITIONAL STUDIES:        Pager [phone]  After 5 pm/weekends or if no response :[phone] infectious diseases progress note:    Patient is a 62y old  Male who presents with a chief complaint of Acute cholecystitis, gallstone pancreatitis (12 Dec 2022 08:38)        Acute pancreatitis without infection or necrosis          ROS:  CONSTITUTIONAL:  Negative fever or chills, feels well, good appetite  EYES:  Negative  blurry vision or double vision  CARDIOVASCULAR:  Negative for chest pain or palpitations  RESPIRATORY:  Negative for cough, wheezing, or SOB   GASTROINTESTINAL:  Negative for nausea, vomiting, diarrhea, constipation, or abdominal pain  GENITOURINARY:  Negative frequency, urgency or dysuria  NEUROLOGIC:  No headache, confusion, dizziness, lightheadedness    Allergies    No Known Allergies    Intolerances        ANTIBIOTICS/RELEVANT:  antimicrobials  fluconAZOLE IVPB      fluconAZOLE IVPB 200 milliGRAM(s) IV Intermittent every 24 hours  meropenem  IVPB 1000 milliGRAM(s) IV Intermittent every 12 hours  vancomycin  IVPB 1000 milliGRAM(s) IV Intermittent every 24 hours    immunologic:    OTHER:  aMIOdarone    Tablet   Oral   aMIOdarone    Tablet 400 milliGRAM(s) Oral every 8 hours  aMIOdarone Infusion 0.5 mG/Min IV Continuous <Continuous>  atorvastatin 40 milliGRAM(s) Oral at bedtime  chlorhexidine 0.12% Liquid 15 milliLiter(s) Oral Mucosa every 12 hours  chlorhexidine 2% Cloths 1 Application(s) Topical <User Schedule>  CRRT Treatment    <Continuous>  heparin  Infusion 800 Unit(s)/Hr IV Continuous <Continuous>  insulin lispro (ADMELOG) corrective regimen sliding scale   SubCutaneous every 4 hours  milrinone Infusion 0.1 MICROgram(s)/kG/Min IV Continuous <Continuous>  pantoprazole  Injectable 40 milliGRAM(s) IV Push two times a day  Phoxillum Filtration BK 4 / 2.5 5000 milliLiter(s) CRRT <Continuous>  polyethylene glycol 3350 17 Gram(s) Oral two times a day  PrismaSATE Dialysate BK 0 / 3.5 5000 milliLiter(s) CRRT <Continuous>  PrismaSOL Filtration BGK 4 / 2.5 5000 milliLiter(s) CRRT <Continuous>  senna 2 Tablet(s) Oral at bedtime  sodium chloride 0.9%. 1000 milliLiter(s) IV Continuous <Continuous>  vasopressin Infusion 0.033 Unit(s)/Min IV Continuous <Continuous>      Objective:  Vital Signs Last 24 Hrs  T(C): 36.9 (12 Dec 2022 08:00), Max: 38.2 (12 Dec 2022 01:45)  T(F): 98.4 (12 Dec 2022 08:00), Max: 100.8 (12 Dec 2022 01:45)  HR: 109 (12 Dec 2022 08:15) (75 - 170)  BP: --  BP(mean): --  RR: 23 (12 Dec 2022 08:15) (8 - 32)  SpO2: 100% (12 Dec 2022 08:15) (96% - 100%)    Parameters below as of 12 Dec 2022 08:00  Patient On (Oxygen Delivery Method): ventilator    O2 Concentration (%): 40      Eyes:INOCENCIO, EOMI  Ear/Nose/Throat: no oral lesion, no sinus tenderness on percussion	  Neck:no JVD, no lymphadenopathy, supple  Respiratory: CTA lore  Cardiovascular: S1S2 RRR, no murmurs  Gastrointestinal:soft, (+) BS, no HSM  Extremities:no e/e/c        LABS:                        9.2    18.07 )-----------( 142      ( 12 Dec 2022 00:41 )             28.2     12-    134<L>  |  99  |  25<H>  ----------------------------<  100<H>  3.8   |  20<L>  |  1.55<H>    Ca    8.3<L>      12 Dec 2022 00:41  Phos  3.4     12  Mg     2.1     12-12    TPro  6.2  /  Alb  2.8<L>  /  TBili  1.2  /  DBili  x   /  AST  44<H>  /  ALT  18  /  AlkPhos  134<H>  12-12    PT/INR - ( 12 Dec 2022 00:41 )   PT: 13.7 sec;   INR: 1.18 ratio         PTT - ( 12 Dec 2022 00:41 )  PTT:30.7 sec  Urinalysis Basic - ( 10 Dec 2022 20:01 )    Color: Brown / Appearance: Slightly Turbid / S.020 / pH: x  Gluc: x / Ketone: Trace  / Bili: Moderate / Urobili: Negative   Blood: x / Protein: 300 mg/dL / Nitrite: Positive   Leuk Esterase: Large / RBC: >50 /hpf / WBC >50 /HPF   Sq Epi: x / Non Sq Epi: 3 / Bacteria: Moderate          MICROBIOLOGY:    RECENT CULTURES:  12-10 @ 15:45 .Blood Blood-Peripheral                No growth to date.    12-10 @ 15:30 .Blood Blood-Peripheral                No growth to date.    12-10 @ 10:49 ET Tube ET Tube       Moderate polymorphonuclear leukocytes per low power field  No Squamous epithelial cells per low power field  Few Yeast like cells per oil power field           Normal Respiratory Christy present     @ 04:58 ET Tube ET Tube       Numerous polymorphonuclear leukocytes per low power field  Moderate Squamous epithelial cells per low power field  Few Yeast like cells  per oil power field           Normal Respiratory Christy present     @ 23:45 .Blood Blood-Peripheral                No growth to date.     @ 23:44 .Blood Blood-Peripheral                No growth to date.          RESPIRATORY CULTURES:              RADIOLOGY & ADDITIONAL STUDIES:        Pager [phone]  After 5 pm/weekends or if no response :[phone] infectious diseases progress note:    Patient is a 62y old  Male who presents with a chief complaint of Acute cholecystitis, gallstone pancreatitis (12 Dec 2022 08:38)        Acute pancreatitis without infection or necrosis          ROS:  CONSTITUTIONAL:  Negative fever or chills, feels well, good appetite  EYES:  Negative  blurry vision or double vision  CARDIOVASCULAR:  Negative for chest pain or palpitations  RESPIRATORY:  Negative for cough, wheezing, or SOB   GASTROINTESTINAL:  Negative for nausea, vomiting, diarrhea, constipation, or abdominal pain  GENITOURINARY:  Negative frequency, urgency or dysuria  NEUROLOGIC:  No headache, confusion, dizziness, lightheadedness    Allergies    No Known Allergies    Intolerances        ANTIBIOTICS/RELEVANT:  antimicrobials  fluconAZOLE IVPB      fluconAZOLE IVPB 200 milliGRAM(s) IV Intermittent every 24 hours  meropenem  IVPB 1000 milliGRAM(s) IV Intermittent every 12 hours  vancomycin  IVPB 1000 milliGRAM(s) IV Intermittent every 24 hours    immunologic:    OTHER:  aMIOdarone    Tablet   Oral   aMIOdarone    Tablet 400 milliGRAM(s) Oral every 8 hours  aMIOdarone Infusion 0.5 mG/Min IV Continuous <Continuous>  atorvastatin 40 milliGRAM(s) Oral at bedtime  chlorhexidine 0.12% Liquid 15 milliLiter(s) Oral Mucosa every 12 hours  chlorhexidine 2% Cloths 1 Application(s) Topical <User Schedule>  CRRT Treatment    <Continuous>  heparin  Infusion 800 Unit(s)/Hr IV Continuous <Continuous>  insulin lispro (ADMELOG) corrective regimen sliding scale   SubCutaneous every 4 hours  milrinone Infusion 0.1 MICROgram(s)/kG/Min IV Continuous <Continuous>  pantoprazole  Injectable 40 milliGRAM(s) IV Push two times a day  Phoxillum Filtration BK 4 / 2.5 5000 milliLiter(s) CRRT <Continuous>  polyethylene glycol 3350 17 Gram(s) Oral two times a day  PrismaSATE Dialysate BK 0 / 3.5 5000 milliLiter(s) CRRT <Continuous>  PrismaSOL Filtration BGK 4 / 2.5 5000 milliLiter(s) CRRT <Continuous>  senna 2 Tablet(s) Oral at bedtime  sodium chloride 0.9%. 1000 milliLiter(s) IV Continuous <Continuous>  vasopressin Infusion 0.033 Unit(s)/Min IV Continuous <Continuous>      Objective:  Vital Signs Last 24 Hrs  T(C): 36.9 (12 Dec 2022 08:00), Max: 38.2 (12 Dec 2022 01:45)  T(F): 98.4 (12 Dec 2022 08:00), Max: 100.8 (12 Dec 2022 01:45)  HR: 109 (12 Dec 2022 08:15) (75 - 170)  BP: --  BP(mean): --  RR: 23 (12 Dec 2022 08:15) (8 - 32)  SpO2: 100% (12 Dec 2022 08:15) (96% - 100%)    Parameters below as of 12 Dec 2022 08:00  Patient On (Oxygen Delivery Method): ventilator    O2 Concentration (%): 40      Eyes:INOCENCIO, EOMI  Ear/Nose/Throat: no oral lesion, no sinus tenderness on percussion	  Neck:no JVD, no lymphadenopathy, supple  Respiratory: CTA lore  Cardiovascular: S1S2 RRR, no murmurs  Gastrointestinal:soft, (+) BS, no HSM  Extremities:no e/e/c        LABS:                        9.2    18.07 )-----------( 142      ( 12 Dec 2022 00:41 )             28.2     12-    134<L>  |  99  |  25<H>  ----------------------------<  100<H>  3.8   |  20<L>  |  1.55<H>    Ca    8.3<L>      12 Dec 2022 00:41  Phos  3.4     12  Mg     2.1     12-12    TPro  6.2  /  Alb  2.8<L>  /  TBili  1.2  /  DBili  x   /  AST  44<H>  /  ALT  18  /  AlkPhos  134<H>  12-12    PT/INR - ( 12 Dec 2022 00:41 )   PT: 13.7 sec;   INR: 1.18 ratio         PTT - ( 12 Dec 2022 00:41 )  PTT:30.7 sec  Urinalysis Basic - ( 10 Dec 2022 20:01 )    Color: Brown / Appearance: Slightly Turbid / S.020 / pH: x  Gluc: x / Ketone: Trace  / Bili: Moderate / Urobili: Negative   Blood: x / Protein: 300 mg/dL / Nitrite: Positive   Leuk Esterase: Large / RBC: >50 /hpf / WBC >50 /HPF   Sq Epi: x / Non Sq Epi: 3 / Bacteria: Moderate          MICROBIOLOGY:    RECENT CULTURES:  12-10 @ 15:45 .Blood Blood-Peripheral                No growth to date.    12-10 @ 15:30 .Blood Blood-Peripheral                No growth to date.    12-10 @ 10:49 ET Tube ET Tube       Moderate polymorphonuclear leukocytes per low power field  No Squamous epithelial cells per low power field  Few Yeast like cells per oil power field           Normal Respiratory Christy present     @ 04:58 ET Tube ET Tube       Numerous polymorphonuclear leukocytes per low power field  Moderate Squamous epithelial cells per low power field  Few Yeast like cells  per oil power field           Normal Respiratory Christy present     @ 23:45 .Blood Blood-Peripheral                No growth to date.     @ 23:44 .Blood Blood-Peripheral                No growth to date.          RESPIRATORY CULTURES:              RADIOLOGY & ADDITIONAL STUDIES:        Pager [phone]  After 5 pm/weekends or if no response :[phone]

## 2022-12-12 NOTE — CONSULT NOTE ADULT - CRITICAL CARE ATTENDING COMMENT
HPI as per resident note, personally verified by me. Patient with complicated history including recent CAD/STEMI and PEA arrest on 11/25. In heart failure. He was placed on ECMO and IABP inserted. He remained intubated but was following commands. On 12/11 he had acute worsening in mental status and was intermittently tracking (R > L?) but no longer following commands. No abnormal movements noted and no clear focal neurologic deficits. Given Keppra 1g IV ~06:00 without obvious improvement.    GTT:  Heparin 1200 Units/hour  Milrinone 0.1 mcg/kg/min  Amiodarone 0.5 mg/min    Gen - NAD, intubated, not sedated  CV - Peripheral circulation intact, no evidence of edema  Eyes - Fundoscopy not well visualized    Neurologic exam:  MS - Intubated, not sedated, Awake, attends to visual > auditory stimuli b/l, no speech output, does not follow commands. Due to clinical condition unable to assess (HYUN) orientation, rep/naming, attn/conc/recent and remote memory/fund of knowledge  CN - PERRL, EOMI, VFF to threat b/l, face sens/str WNL b/l, hearing grossly intact to conversation b/l, SCM at least 4/5 b/l and symmetric, (+) spontaneous respirations, (+) cough. HYUN tongue/palate  Motor - Normal bulk and mildly dec tone throughout. No spontaneous movement. No grimace or withdrawal to strong noxious stimuli in any extremity  Sens - As per Motor above  DTR's - 2+ all except for tr AJ b/l and neutral b/l plantar response  Coord - HYUN  Gait and station - HYUN    CT perfusion shows area of L sided ischemia in MCA/PCA watershed zone    A/P:  Encephalopathy  Cardiac arrest  CAD/STEMI  DM type 2  HTN  Subdural hematoma (SDH)  Heart failure  Sepsis  Hyponatremia (Na dec 134)  Pancreatitis  ERIC (BUN/Cr inc 25/1.55, GFR dec 50)  Possible stroke/ischemia in L MCA/PCA watershed territory    - Etiology for encephalopathy is broad and includes cerebrovascular (watershed area of ischemia, although only unilateral), toxic/metabolic, iatrogenic (was on cefepime), or even seizure. No clear focal neurologic deficits on exam. Preliminary EEG reading at bedside with very mild generalized slowing without epileptiform discharges or seizures  - vEEG to evaluate for focal slowing, epileptiform discharges, or seizures. Can stop tomorrow (12/13) if unrevealing  - No AED's at this time  - MRI brain w/o if able (given IABP). Alternatively can do repeat CT head w/o in 48 hours (12/14) if this cannot be done  - Continue to address above medical problems, as you are doing  - Will continue to follow patient with you HPI as per resident note, personally verified by me. Patient with complicated history including recent CAD/STEMI and PEA arrest on 11/25. In heart failure. He was placed on ECMO and IABP inserted. He remained intubated but was following commands. On 12/11 he had acute worsening in mental status and was intermittently tracking (R > L?) but no longer following commands. No abnormal movements noted and no clear focal neurologic deficits. Given Keppra 1g IV ~06:00 without obvious improvement.    GTT:  Heparin 1200 Units/hour  Milrinone 0.1 mcg/kg/min  Amiodarone 0.5 mg/min    Gen - NAD, intubated, not sedated  CV - Peripheral circulation intact, no evidence of edema  Eyes - Fundoscopy not well visualized    Neurologic exam:  MS - Intubated, not sedated, Awake, attends to visual > auditory stimuli b/l, no speech output, does not follow commands. Due to clinical condition unable to assess (HYUN) orientation, rep/naming, attn/conc/recent and remote memory/fund of knowledge  CN - PERRL, EOMI, VFF to threat b/l, face sens/str WNL b/l, hearing grossly intact to conversation b/l, SCM at least 4/5 b/l and symmetric, (+) spontaneous respirations, (+) cough. HUYN tongue/palate  Motor - Normal bulk and mildly dec tone throughout. No spontaneous movement. No grimace or withdrawal to strong noxious stimuli in any extremity  Sens - As per Motor above  DTR's - 2+ all except for tr AJ b/l and neutral b/l plantar response  Coord - HYUN  Gait and station - HYUN    CT perfusion shows area of L sided ischemia in MCA/PCA watershed zone    A/P:  Encephalopathy  Cardiac arrest  CAD/STEMI  DM type 2  HTN  Subdural hematoma (SDH)  Heart failure  Sepsis  Hyponatremia (Na dec 134)  Pancreatitis  ERIC (BUN/Cr inc 25/1.55, GFR dec 50)  Possible stroke/ischemia in L MCA/PCA watershed territory    - Etiology for encephalopathy is broad and includes cerebrovascular (watershed area of ischemia, although only unilateral), toxic/metabolic, iatrogenic (was on cefepime), or even seizure. No clear focal neurologic deficits on exam. Preliminary EEG reading at bedside with very mild generalized slowing without epileptiform discharges or seizures  - vEEG to evaluate for focal slowing, epileptiform discharges, or seizures. Can stop tomorrow (12/13) if unrevealing  - No AED's at this time  - MRI brain w/o if able (given IABP). Alternatively can do repeat CT head w/o in 48 hours (12/14) if this cannot be done  - Continue to address above medical problems, as you are doing  - Will continue to follow patient with you

## 2022-12-12 NOTE — PROGRESS NOTE ADULT - PROBLEM SELECTOR PLAN 1
Pt with non oliguric ERIC/ ATN in the setting of STEMI, cardiac arrest & cardiogenic shock  SCr on arrival was 2.15; trended down to hector 1.6 on 11/25;  slowly trended up & increased to 3.95 11/28.     Pt received IV diuretics. SCr increased to 4.19 with BUN of 101 on 12/5/22. Pt with significant volume overload. Pt initiated on CRRT/CVVHDF to optimize volume and electrolytes on 12/5/22. BP being maintained on vasopressors. Pt likely with ATN. Pt underwent decannulation of ECMO on 12/8/22 and was taken off CRRT. Pt reinitiated on CRRT overnight on 12/9/22 for volume overload. Plan is to continue CRRT. Monitor labs and urine output. Avoid any potential nephrotoxins. Dose medications as per eGFR.

## 2022-12-12 NOTE — CONSULT NOTE ADULT - ASSESSMENT
Assessment: Per chart review: Patient EDUARDO REAL is a 62y (1960) man with a hx of CAD s/p 4v CABG ~2019 in [facility], DMII A1c 7.3%, HTN, HLD, who originally presented as a transfer from [facility] with concern for STEMI, had presented to [facility] with abdominal pain, n/v, was found to have pancreatitis and elevated troponins. CT abdomen revealed acute pancreatitis and his initial LVEF was 40-45%. He developed MSOF with hypoxic respiratory failure requiring intubation and ERIC and went into hypoxic PEA arrest requiring ACLS and due to persistent hypoxia and hypotension on pressors after ROSC was cannulated to [facility] ECMO on 11/25. Notably CTH that day revealed small subdural hemorrhage. His post arrest TTE on 11/26 showed a significantly worse LVEF of 5-10% with an AV that was only minimally opening.  Per consult received over the phone: Patient s/p fulminant cardiogenic shock had been placed on ECMO, now off of ECMO and on balloon pump, was on anticoagulation entire time. Had been noted to have a small SDH on 11/25. Patient was following commands. On 12/11/22, patient noted to have an acute change: no longer following any commands, not moving arms or legs b/l, not withdrawing to pain, does track with eyes, but not responding to questions or commands. Last seen at baseline by nurse at 10:45PM. Anticoagulation was held, NSGY was consulted, CTH was done STAT, patient was okayed by neurosurgery to continue back on anticoagulation, was placed back on a heparin drip. Neurology was consulted.  CTH noncontrast looks similar to prior, awaiting official read.   Labs: WBC 18. HGB 9.2. MCV 89. RDW 15. . INR 1.18. PT 13.7. PTT 30.7. Chance 134. BUN 25. Cr 1.55. Glu 100. Ca 8.3. Alb 2.8. AlkP 134. Amylase 206. Lipase 404. LDL 63. A1c 7.3.  . /45. RR 30. O2 100%. Temp 100.8 Patient on heparin drip, insulin, milrinone. Febrile, ID following, pt on vancomycin and cefepime.    Exam: Patient intubated, currently on heparin drip, was recently on precedex drip. Eyes open but not tracking, pupils equal, right gaze preference, not following commands, no spontaneous movement of any extremity, no withdrawal to pain or grimace in any extremity, gag intact, corneals intact. Patient constantly shivering.   NIH ~25. LKN: 10:45PM.     Impression: Acute change in mentation and functional status with R gaze preference, generalized shivering, not following commands, concerning for possible acute ischemic event vs encephalopathy vs seizure in the setting of critically ill patient with multiple comorbidities including recent cardiogenic shock requiring recent ECMO and now IABP, sepsis/fever, acute pancreatitis.       Recommendations:  [] stat CTP and CTA head and neck to evaluate if LVO present  [] MRI brain without contrast when able  [] TTE w/ bubble   [] AC per primary team and nsgy for now  [] DVT prophylaxis per primary team  [] NPO after extubation unless passes dysphagia screen; swallow eval if fails  [] Keep BP permissive up to 220/110 for 48 hours followed by gradual normotension over 2-3 days   [] Telemonitoring  [] Neurological checks and vital signs per unit protocol  [] BGM goals 140-180  [] PT/OT; S/S evaluation    Case and plan discussed with stroke fellow Dr. Hutson, under supervision of stroke attending.    * Case and plan not final until Attending attestation *   Assessment: Per chart review: Patient EDUARDO REAL is a 62y (1960) man with a hx of CAD s/p 4v CABG ~2019 in [facility], DMII A1c 7.3%, HTN, HLD, who originally presented as a transfer from [facility] with concern for STEMI, had presented to [facility] with abdominal pain, n/v, was found to have pancreatitis and elevated troponins. CT abdomen revealed acute pancreatitis and his initial LVEF was 40-45%. He developed MSOF with hypoxic respiratory failure requiring intubation and ERIC and went into hypoxic PEA arrest requiring ACLS and due to persistent hypoxia and hypotension on pressors after ROSC was cannulated to VA ECMO on 11/25. Notably CTH that day revealed small subdural hemorrhage. His post arrest TTE on 11/26 showed a significantly worse LVEF of 5-10% with an AV that was only minimally opening.  Per consult received over the phone: Patient s/p fulminant cardiogenic shock had been placed on ECMO, now off of ECMO and on balloon pump, was on anticoagulation entire time. Had been noted to have a small SDH on 11/25. Patient was following commands. On 12/11/22, patient noted to have an acute change: no longer following any commands, not moving arms or legs b/l, not withdrawing to pain, does track with eyes, but not responding to questions or commands. Last seen at baseline by nurse at 10:45PM. Anticoagulation was held, NSGY was consulted, CTH was done STAT, patient was okayed by neurosurgery to continue back on anticoagulation, was placed back on a heparin drip. Neurology was consulted.  CTH noncontrast looks similar to prior, awaiting official read.   Labs: WBC 18. HGB 9.2. MCV 89. RDW 15. . INR 1.18. PT 13.7. PTT 30.7. Chance 134. BUN 25. Cr 1.55. Glu 100. Ca 8.3. Alb 2.8. AlkP 134. Amylase 206. Lipase 404. LDL 63. A1c 7.3.  . /45. RR 30. O2 100%. Temp 100.8 Patient on heparin drip, insulin, milrinone. Febrile, ID following, pt on vancomycin and cefepime.    Exam: Patient intubated, currently on heparin drip, was recently on precedex drip. Eyes open but not tracking, pupils equal, right gaze preference, not following commands, no spontaneous movement of any extremity, no withdrawal to pain or grimace in any extremity, gag intact, corneals intact. Patient constantly shivering.   NIH ~25. LKN: 10:45PM.     Impression: Acute change in mentation and functional status with R gaze preference, generalized shivering, not following commands, concerning for possible acute ischemic event vs encephalopathy vs seizure in the setting of critically ill patient with multiple comorbidities including recent cardiogenic shock requiring recent ECMO and now IABP, sepsis/fever, acute pancreatitis.       Recommendations:  [] stat CTP and CTA head and neck to evaluate if LVO present  [] MRI brain without contrast when able  [] TTE w/ bubble   [] AC per primary team and nsgy for now  [] DVT prophylaxis per primary team  [] NPO after extubation unless passes dysphagia screen; swallow eval if fails  [] Keep BP permissive up to 220/110 for 48 hours followed by gradual normotension over 2-3 days   [] Telemonitoring  [] Neurological checks and vital signs per unit protocol  [] BGM goals 140-180  [] PT/OT; S/S evaluation    Case and plan discussed with stroke fellow Dr. Hutson, under supervision of stroke attending.    * Case and plan not final until Attending attestation *   Assessment: Per chart review: Patient EDUARDO REAL is a 62y (1960) man with a hx of CAD s/p 4v CABG ~2019 in [facility], DMII A1c 7.3%, HTN, HLD, who originally presented as a transfer from [facility] with concern for STEMI[facility], [facility], was found to have pancreatitis and elevated troponins. CT abdomen revealed acute pancreatitis and his initial LVEF was 40-45%. He developed MSOF with hypoxic respiratory failure requiring intubation and ERIC and went into hypoxic PEA arrest requiring ACLS and due to persistent hypoxia and hypotension on pressors after ROSC was cannulated to VA ECMO on 11/25. Notably CTH that day revealed small subdural hemorrhage. His post arrest TTE on 11/26 showed a significantly worse LVEF of 5-10% with an AV that was only minimally opening.  Per consult received over the phone: Patient s/p fulminant cardiogenic shock had been placed on ECMO, now off of ECMO and on balloon pump, was on anticoagulation entire time. Had been noted to have a small SDH on 11/25. Patient was following commands. On 12/11/22, patient noted to have an acute change: no longer following any commands, not moving arms or legs b/l, not withdrawing to pain, does track with eyes, but not responding to questions or commands. Last seen at baseline by nurse at 10:45PM. Anticoagulation was held, NSGY was consulted, CTH was done STAT, patient was okayed by neurosurgery to continue back on anticoagulation, was placed back on a heparin drip. Neurology was consulted.  CTH noncontrast looks similar to prior, awaiting official read.   Labs: WBC 18. HGB 9.2. MCV 89. RDW 15. . INR 1.18. PT 13.7. PTT 30.7. Chance 134. BUN 25. Cr 1.55. Glu 100. Ca 8.3. Alb 2.8. AlkP 134. Amylase 206. Lipase 404. LDL 63. A1c 7.3.  . /45. RR 30. O2 100%. Temp 100.8 Patient on heparin drip, insulin, milrinone. Febrile, ID following, pt on vancomycin and cefepime.    Exam: Patient intubated, currently on heparin drip, was recently on precedex drip. Eyes open but not tracking, pupils equal, right gaze preference, not following commands, no spontaneous movement of any extremity, no withdrawal to pain or grimace in any extremity, gag intact, corneals intact. Patient constantly shivering.   NIH ~25. LKN: 10:45PM.     Impression: Acute change in mentation and functional status with R gaze preference, generalized shivering, not following commands, concerning for possible acute ischemic event vs encephalopathy vs seizure in the setting of critically ill patient with multiple comorbidities including recent cardiogenic shock requiring recent ECMO and now IABP, sepsis/fever, acute pancreatitis.       Recommendations:  [] stat CTP and CTA head and neck to evaluate if LVO present  [] MRI brain without contrast when able  [] TTE w/ bubble   [] AC per primary team and nsgy for now  [] DVT prophylaxis per primary team  [] NPO after extubation unless passes dysphagia screen; swallow eval if fails  [] Keep BP permissive up to 220/110 for 48 hours followed by gradual normotension over 2-3 days   [] Telemonitoring  [] Neurological checks and vital signs per unit protocol  [] BGM goals 140-180  [] PT/OT; S/S evaluation    Case and plan discussed with stroke fellow Dr. Hutson, under supervision of stroke attending.    * Case and plan not final until Attending attestation *   Assessment: Per chart review: Patient EDUARDO REAL is a 62y (1960) man with a hx of CAD s/p 4v CABG ~2019 in [facility], DMII A1c 7.3%, HTN, HLD, who originally presented as a transfer from [facility] with concern for STEMI, had presented to [facility] with abdominal pain, n/v, was found to have pancreatitis and elevated troponins. CT abdomen revealed acute pancreatitis and his initial LVEF was 40-45%. He developed MSOF with hypoxic respiratory failure requiring intubation and ERIC and went into hypoxic PEA arrest requiring ACLS and due to persistent hypoxia and hypotension on pressors after ROSC was cannulated to [facility] ECMO on 11/25. Notably CTH that day revealed small subdural hemorrhage. His post arrest TTE on 11/26 showed a significantly worse LVEF of 5-10% with an AV that was only minimally opening.  Per consult received over the phone: Patient s/p fulminant cardiogenic shock had been placed on ECMO, now off of ECMO and on balloon pump, was on anticoagulation entire time. Had been noted to have a small SDH on 11/25. Patient was following commands. On 12/11/22, patient noted to have an acute change: no longer following any commands, not moving arms or legs b/l, not withdrawing to pain, does track with eyes, but not responding to questions or commands. Last seen at baseline by nurse at 10:45PM. Anticoagulation was held, NSGY was consulted, CTH was done STAT, patient was okayed by neurosurgery to continue back on anticoagulation, was placed back on a heparin drip. Neurology was consulted.  CTH noncontrast looks similar to prior, awaiting official read.   Labs: WBC 18. HGB 9.2. MCV 89. RDW 15. . INR 1.18. PT 13.7. PTT 30.7. Chance 134. BUN 25. Cr 1.55. Glu 100. Ca 8.3. Alb 2.8. AlkP 134. Amylase 206. Lipase 404. LDL 63. A1c 7.3.  . /45. RR 30. O2 100%. Temp 100.8 Patient on heparin drip, insulin, milrinone. Febrile, ID following, pt on vancomycin and cefepime.    Exam: Patient intubated, currently on heparin drip, was recently on precedex drip. Eyes open but not tracking, pupils equal, right gaze preference, not following commands, no spontaneous movement of any extremity, no withdrawal to pain or grimace in any extremity, gag intact, corneals intact. Patient constantly shivering.   NIH ~25. LKN: 10:45PM.     Impression: Acute change in mentation and functional status with R gaze preference, generalized shivering, not following commands, concerning for possible acute ischemic event vs encephalopathy vs seizure in the setting of critically ill patient with multiple comorbidities including recent cardiogenic shock requiring recent ECMO and now IABP, sepsis/fever, acute pancreatitis.       Recommendations:  [] stat CTP and CTA head and neck to evaluate if LVO present  [] MRI brain without contrast when able  [] TTE w/ bubble   [] AC per primary team and nsgy for now  [] DVT prophylaxis per primary team  [] NPO after extubation unless passes dysphagia screen; swallow eval if fails  [] Keep BP permissive up to 220/110 for 48 hours followed by gradual normotension over 2-3 days   [] Telemonitoring  [] Neurological checks and vital signs per unit protocol  [] BGM goals 140-180  [] PT/OT; S/S evaluation  [] Continue toxic metabolic infectious workup per primary team    Case and plan discussed with stroke fellow Dr. Hutson, under supervision of stroke attending.    * Case and plan not final until Attending attestation *   Assessment: Per chart review: Patient EDUARDO REAL is a 62y (1960) man with a hx of CAD s/p 4v CABG ~2019 in [facility], DMII A1c 7.3%, HTN, HLD, who originally presented as a transfer from [facility] with concern for STEMI, had presented to [facility] with abdominal pain, n/v, was found to have pancreatitis and elevated troponins. CT abdomen revealed acute pancreatitis and his initial LVEF was 40-45%. He developed MSOF with hypoxic respiratory failure requiring intubation and ERIC and went into hypoxic PEA arrest requiring ACLS and due to persistent hypoxia and hypotension on pressors after ROSC was cannulated to VA ECMO on 11/25. Notably CTH that day revealed small subdural hemorrhage. His post arrest TTE on 11/26 showed a significantly worse LVEF of 5-10% with an AV that was only minimally opening.  Per consult received over the phone: Patient s/p fulminant cardiogenic shock had been placed on ECMO, now off of ECMO and on balloon pump, was on anticoagulation entire time. Had been noted to have a small SDH on 11/25. Patient was following commands. On 12/11/22, patient noted to have an acute change: no longer following any commands, not moving arms or legs b/l, not withdrawing to pain, does track with eyes, but not responding to questions or commands. Last seen at baseline by nurse at 10:45PM. Anticoagulation was held, NSGY was consulted, CTH was done STAT, patient was okayed by neurosurgery to continue back on anticoagulation, was placed back on a heparin drip. Neurology was consulted.  CTH noncontrast looks similar to prior, awaiting official read.   Labs: WBC 18. HGB 9.2. MCV 89. RDW 15. . INR 1.18. PT 13.7. PTT 30.7. Chance 134. BUN 25. Cr 1.55. Glu 100. Ca 8.3. Alb 2.8. AlkP 134. Amylase 206. Lipase 404. LDL 63. A1c 7.3.  . /45. RR 30. O2 100%. Temp 100.8 Patient on heparin drip, insulin, milrinone. Febrile, ID following, pt on vancomycin and cefepime.    Exam: Patient intubated, currently on heparin drip, was recently on precedex drip. Eyes open but not tracking, pupils equal, right gaze preference, not following commands, no spontaneous movement of any extremity, no withdrawal to pain or grimace in any extremity, gag intact, corneals intact. Patient constantly shivering.   NIH ~25. LKN: 10:45PM.     Impression: Acute change in mentation and functional status with R gaze preference, generalized shivering, not following commands, concerning for possible acute ischemic event vs encephalopathy vs seizure in the setting of critically ill patient with multiple comorbidities including recent cardiogenic shock requiring recent ECMO and now IABP, sepsis/fever, acute pancreatitis.       Recommendations:  [] stat CTP and CTA head and neck to evaluate if LVO present  [] MRI brain without contrast when able  [] TTE w/ bubble   [] AC per primary team and nsgy for now  [] DVT prophylaxis per primary team  [] NPO after extubation unless passes dysphagia screen; swallow eval if fails  [] Keep BP permissive up to 220/110 for 48 hours followed by gradual normotension over 2-3 days   [] Telemonitoring  [] Neurological checks and vital signs per unit protocol  [] BGM goals 140-180  [] PT/OT; S/S evaluation  [] Continue toxic metabolic infectious workup per primary team    Case and plan discussed with stroke fellow Dr. Hutson, under supervision of stroke attending.    * Case and plan not final until Attending attestation *   Assessment: Per chart review: Patient EDUARDO REAL is a 62y (1960) man with a hx of CAD s/p 4v CABG ~2019 in [facility], DMII A1c 7.3%, HTN, HLD, who originally presented as a transfer from [facility] with concern for STEMI, had presented to [facility] with abdominal pain, n/v, was found to have pancreatitis and elevated troponins. CT abdomen revealed acute pancreatitis and his initial LVEF was 40-45%. He developed MSOF with hypoxic respiratory failure requiring intubation and ERIC and went into hypoxic PEA arrest requiring ACLS and due to persistent hypoxia and hypotension on pressors after ROSC was cannulated to VA ECMO on 11/25. Notably CTH that day revealed small subdural hemorrhage. His post arrest TTE on 11/26 showed a significantly worse LVEF of 5-10% with an AV that was only minimally opening.  Per consult received over the phone: Patient s/p fulminant cardiogenic shock had been placed on ECMO, now off of ECMO and on balloon pump, was on anticoagulation entire time. Had been noted to have a small SDH on 11/25. Patient was following commands. On 12/11/22, patient noted to have an acute change: no longer following any commands, not moving arms or legs b/l, not withdrawing to pain, does track with eyes, but not responding to questions or commands. Last seen at baseline by nurse at 10:45PM. Anticoagulation was held, NSGY was consulted, CTH was done STAT, patient was okayed by neurosurgery to continue back on anticoagulation, was placed back on a heparin drip. Neurology was consulted.  CTH noncontrast looks similar to prior, awaiting official read.   Labs: WBC 18. HGB 9.2. MCV 89. RDW 15. . INR 1.18. PT 13.7. PTT 30.7. Chance 134. BUN 25. Cr 1.55. Glu 100. Ca 8.3. Alb 2.8. AlkP 134. Amylase 206. Lipase 404. LDL 63. A1c 7.3.  . /45. RR 30. O2 100%. Temp 100.8 Patient on heparin drip, insulin, milrinone. Febrile, ID following, pt on vancomycin and cefepime.    Exam: Patient intubated, currently on heparin drip, was recently on precedex drip. Eyes open but not tracking, pupils equal, right gaze preference, not following commands, no spontaneous movement of any extremity, no withdrawal to pain or grimace in any extremity, gag intact, corneals intact. Patient constantly shivering.   NIH ~25. LKN: 10:45PM.     Impression: Acute change in mentation and functional status with R gaze preference, generalized shivering, not following commands, concerning for possible acute ischemic event vs encephalopathy vs seizure in the setting of critically ill patient with multiple comorbidities including recent cardiogenic shock requiring recent ECMO and now IABP, sepsis/fever, acute pancreatitis.       Recommendations:  [] stat CTP and CTA head and neck to evaluate if LVO present  [] MRI brain without contrast when able  [] TTE w/ bubble   [] AC per primary team and nsgy for now  [] DVT prophylaxis per primary team  [] NPO after extubation unless passes dysphagia screen; swallow eval if fails  [] Keep BP permissive up to 220/110 for 48 hours followed by gradual normotension over 2-3 days   [] Telemonitoring  [] Neurological checks and vital signs per unit protocol  [] BGM goals 140-180  [] PT/OT; S/S evaluation  [] Continue toxic metabolic infectious workup per primary team    Case and plan discussed with stroke fellow Dr. Hutson, under supervision of stroke attending.    * Case and plan not final until Attending attestation *   Assessment: Per chart review: Patient EDUARDO REAL is a 62y (1960) man with a hx of CAD s/p 4v CABG ~2019 in [facility], DMII A1c 7.3%, HTN, HLD, who originally presented as a transfer from [facility] with concern for STEMI, had presented to [facility] with abdominal pain, n/v, was found to have pancreatitis and elevated troponins. CT abdomen revealed acute pancreatitis and his initial LVEF was 40-45%. He developed MSOF with hypoxic respiratory failure requiring intubation and ERIC and went into hypoxic PEA arrest requiring ACLS and due to persistent hypoxia and hypotension on pressors after ROSC was cannulated to VA ECMO on 11/25. Notably CTH that day revealed small subdural hemorrhage. His post arrest TTE on 11/26 showed a significantly worse LVEF of 5-10% with an AV that was only minimally opening.  Per consult received over the phone: Patient s/p fulminant cardiogenic shock had been placed on ECMO, now off of ECMO and on balloon pump, was on anticoagulation entire time. Had been noted to have a small SDH on 11/25. Patient was following commands. On 12/11/22, patient noted to have an acute change: no longer following any commands, not moving arms or legs b/l, not withdrawing to pain, does track with eyes, but not responding to questions or commands. Last seen at baseline by nurse at 10:45PM. Anticoagulation was held, NSGY was consulted, CTH was done STAT, patient was okayed by neurosurgery to continue back on anticoagulation, was placed back on a heparin drip. Neurology was consulted.  CTH noncontrast looks similar to prior, awaiting official read.   Labs: WBC 18. HGB 9.2. MCV 89. RDW 15. . INR 1.18. PT 13.7. PTT 30.7. Chance 134. BUN 25. Cr 1.55. Glu 100. Ca 8.3. Alb 2.8. AlkP 134. Amylase 206. Lipase 404. LDL 63. A1c 7.3.  . /45. RR 30. O2 100%. Temp 100.8 Patient on heparin drip, insulin, milrinone. Febrile, ID following, pt on vancomycin and cefepime.    Exam: Patient intubated, currently on heparin drip, was recently on precedex drip. Eyes open but not tracking, pupils equal, right gaze preference, not following commands, no spontaneous movement of any extremity, no withdrawal to pain or grimace in any extremity, gag intact, corneals intact. Patient constantly shivering.   NIH ~25. LKN: 10:45PM.     Impression: Acute change in mentation and functional status with R gaze preference, generalized shivering, not following commands, concerning for possible acute ischemic event vs encephalopathy vs seizure in the setting of critically ill patient with multiple comorbidities including recent cardiogenic shock requiring recent ECMO and now IABP, sepsis/fever, acute pancreatitis.       Recommendations:  [] stat CTP and CTA head and neck to evaluate if LVO present  update no LVO (therefore not an MT candidate) seen on CTA, no core seen on CTP - follow up on official read.   [] MRI brain without contrast when able  [] TTE w/ bubble   [] AC per primary team and nsgy for now  [] DVT prophylaxis per primary team  [] NPO after extubation unless passes dysphagia screen; swallow eval if fails  [] Keep BP permissive up to 220/110 for 48 hours followed by gradual normotension over 2-3 days   [] Telemonitoring  [] Neurological checks and vital signs per unit protocol  [] BGM goals 140-180  [] PT/OT; S/S evaluation  [] Continue toxic metabolic infectious workup per primary team  [] vEEG monitoring.   [] Discussed with epilepsy fellow: Patient unlikely to be seizing given overall picture, but can try ativan 2mg challenge and see if any change.     Case and plan discussed with stroke fellow Dr. Hutson, under supervision of stroke attending.    * Case and plan not final until Attending attestation *   Assessment: Per chart review: Patient EDUARDO REAL is a 62y (1960) man with a hx of CAD s/p 4v CABG ~2019 in [facility], DMII A1c 7.3%, HTN, HLD, who originally presented as a transfer from [facility] with concern for STEMI, had presented to [facility] with abdominal pain, n/v, was found to have pancreatitis and elevated troponins. CT abdomen revealed acute pancreatitis and his initial LVEF was 40-45%. He developed MSOF with hypoxic respiratory failure requiring intubation and ERIC and went into hypoxic PEA arrest requiring ACLS and due to persistent hypoxia and hypotension on pressors after ROSC was cannulated to [facility] on 11/25. Notably CTH that day revealed small subdural hemorrhage. His post arrest TTE on 11/26 showed a significantly worse LVEF of 5-10% with an AV that was only minimally opening.  Per consult received over the phone: Patient s/p fulminant cardiogenic shock had been placed on ECMO, now off of ECMO and on balloon pump, was on anticoagulation entire time. Had been noted to have a small SDH on 11/25. Patient was following commands. On 12/11/22, patient noted to have an acute change: no longer following any commands, not moving arms or legs b/l, not withdrawing to pain, does track with eyes, but not responding to questions or commands. Last seen at baseline by nurse at 10:45PM. Anticoagulation was held, NSGY was consulted, CTH was done STAT, patient was okayed by neurosurgery to continue back on anticoagulation, was placed back on a heparin drip. Neurology was consulted.  CTH noncontrast looks similar to prior, awaiting official read.   Labs: WBC 18. HGB 9.2. MCV 89. RDW 15. . INR 1.18. PT 13.7. PTT 30.7. Chance 134. BUN 25. Cr 1.55. Glu 100. Ca 8.3. Alb 2.8. AlkP 134. Amylase 206. Lipase 404. LDL 63. A1c 7.3.  . /45. RR 30. O2 100%. Temp 100.8 Patient on heparin drip, insulin, milrinone. Febrile, ID following, pt on vancomycin and cefepime.    Exam: Patient intubated, currently on heparin drip, was recently on precedex drip. Eyes open but not tracking, pupils equal, right gaze preference, not following commands, no spontaneous movement of any extremity, no withdrawal to pain or grimace in any extremity, gag intact, corneals intact. Patient constantly shivering.   NIH ~25. LKN: 10:45PM.     Impression: Acute change in mentation and functional status with R gaze preference, generalized shivering, not following commands, concerning for possible acute ischemic event vs encephalopathy vs seizure in the setting of critically ill patient with multiple comorbidities including recent cardiogenic shock requiring recent ECMO and now IABP, sepsis/fever, acute pancreatitis.       Recommendations:  [] stat CTP and CTA head and neck to evaluate if LVO present  update no LVO (therefore not an MT candidate) seen on CTA, no core seen on CTP - follow up on official read.   [] MRI brain without contrast when able  [] TTE w/ bubble   [] AC per primary team and nsgy for now  [] DVT prophylaxis per primary team  [] NPO after extubation unless passes dysphagia screen; swallow eval if fails  [] Keep BP permissive up to 220/110 for 48 hours followed by gradual normotension over 2-3 days   [] Telemonitoring  [] Neurological checks and vital signs per unit protocol  [] BGM goals 140-180  [] PT/OT; S/S evaluation  [] Continue toxic metabolic infectious workup per primary team  [] vEEG monitoring.   [] Discussed with epilepsy fellow: Patient unlikely to be seizing given overall picture, but can try ativan 2mg challenge and see if any change.     Case and plan discussed with stroke fellow Dr. Hutson, under supervision of stroke attending.    * Case and plan not final until Attending attestation *   Assessment: Per chart review: Patient EDUARDO REAL is a 62y (1960) man with a hx of CAD s/p 4v CABG ~2019 in [facility], DMII A1c 7.3%, HTN, HLD, who originally presented as a transfer from [facility] with concern for STEMI, had presented to [facility] with abdominal pain, n/v, was found to have pancreatitis and elevated troponins. CT abdomen revealed acute pancreatitis and his initial LVEF was 40-45%. He developed MSOF with hypoxic respiratory failure requiring intubation and ERIC and went into hypoxic PEA arrest requiring ACLS and due to persistent hypoxia and hypotension on pressors after ROSC was cannulated to [facility] ECMO on 11/25. Notably CTH that day revealed small subdural hemorrhage. His post arrest TTE on 11/26 showed a significantly worse LVEF of 5-10% with an AV that was only minimally opening.  Per consult received over the phone: Patient s/p fulminant cardiogenic shock had been placed on ECMO, now off of ECMO and on balloon pump, was on anticoagulation entire time. Had been noted to have a small SDH on 11/25. Patient was following commands. On 12/11/22, patient noted to have an acute change: no longer following any commands, not moving arms or legs b/l, not withdrawing to pain, does track with eyes, but not responding to questions or commands. Last seen at baseline by nurse at 10:45PM. Anticoagulation was held, NSGY was consulted, CTH was done STAT, patient was okayed by neurosurgery to continue back on anticoagulation, was placed back on a heparin drip. Neurology was consulted.  CTH noncontrast looks similar to prior, awaiting official read.   Labs: WBC 18. HGB 9.2. MCV 89. RDW 15. . INR 1.18. PT 13.7. PTT 30.7. Chance 134. BUN 25. Cr 1.55. Glu 100. Ca 8.3. Alb 2.8. AlkP 134. Amylase 206. Lipase 404. LDL 63. A1c 7.3.  . /45. RR 30. O2 100%. Temp 100.8 Patient on heparin drip, insulin, milrinone. Febrile, ID following, pt on vancomycin and cefepime.    Exam: Patient intubated, currently on heparin drip, was recently on precedex drip. Eyes open but not tracking, pupils equal, right gaze preference, not following commands, no spontaneous movement of any extremity, no withdrawal to pain or grimace in any extremity, gag intact, corneals intact. Patient constantly shivering.   NIH ~25. LKN: 10:45PM.     Impression: Acute change in mentation and functional status with R gaze preference, generalized shivering, not following commands, concerning for possible acute ischemic event vs encephalopathy vs seizure in the setting of critically ill patient with multiple comorbidities including recent cardiogenic shock requiring recent ECMO and now IABP, sepsis/fever, acute pancreatitis.       Recommendations:  [] stat CTP and CTA head and neck to evaluate if LVO present  update no LVO (therefore not an MT candidate) seen on CTA, no core seen on CTP - follow up on official read.   [] MRI brain without contrast when able  [] TTE w/ bubble   [] AC per primary team and nsgy for now  [] DVT prophylaxis per primary team  [] NPO after extubation unless passes dysphagia screen; swallow eval if fails  [] Keep BP permissive up to 220/110 for 48 hours followed by gradual normotension over 2-3 days   [] Telemonitoring  [] Neurological checks and vital signs per unit protocol  [] BGM goals 140-180  [] PT/OT; S/S evaluation  [] Continue toxic metabolic infectious workup per primary team  [] vEEG monitoring.   [] Discussed with epilepsy fellow: Patient unlikely to be seizing given overall picture, but can try ativan 2mg challenge and see if any change.     Case and plan discussed with stroke fellow Dr. Hutson, under supervision of stroke attending.    * Case and plan not final until Attending attestation *   Assessment: Per chart review: Patient EDUARDO REAL is a 62y (1960) man with a hx of CAD s/p 4v CABG ~2019 in [facility], DMII A1c 7.3%, HTN, HLD, who originally presented as a transfer from [facility] with concern for STEMI, had presented to [facility] with abdominal pain, n/v, was found to have pancreatitis and elevated troponins. CT abdomen revealed acute pancreatitis and his initial LVEF was 40-45%. He developed MSOF with hypoxic respiratory failure requiring intubation and ERIC and went into hypoxic PEA arrest requiring ACLS and due to persistent hypoxia and hypotension on pressors after ROSC was cannulated to [facility] ECMO on 11/25. Notably CTH that day revealed small subdural hemorrhage. His post arrest TTE on 11/26 showed a significantly worse LVEF of 5-10% with an AV that was only minimally opening.  Per consult received over the phone: Patient s/p fulminant cardiogenic shock had been placed on ECMO, now off of ECMO and on balloon pump, was on anticoagulation entire time. Had been noted to have a small SDH on 11/25. Patient was following commands. On 12/11/22, patient noted to have an acute change: no longer following any commands, not moving arms or legs b/l, not withdrawing to pain, does track with eyes, but not responding to questions or commands. Last seen at baseline by nurse at 10:45PM. Anticoagulation was held, NSGY was consulted, CTH was done STAT, patient was okayed by neurosurgery to continue back on anticoagulation, was placed back on a heparin drip. Neurology was consulted.  CTH noncontrast looks similar to prior, awaiting official read.   Labs: WBC 18. HGB 9.2. MCV 89. RDW 15. . INR 1.18. PT 13.7. PTT 30.7. Chance 134. BUN 25. Cr 1.55. Glu 100. Ca 8.3. Alb 2.8. AlkP 134. Amylase 206. Lipase 404. LDL 63. A1c 7.3.  . /45. RR 30. O2 100%. Temp 100.8 Patient on heparin drip, insulin, milrinone. Febrile, ID following, pt on vancomycin and cefepime.    Exam: Patient intubated, currently on heparin drip, was recently on precedex drip. Eyes open but not tracking, pupils equal, right gaze preference, not following commands, no spontaneous movement of any extremity, no withdrawal to pain or grimace in any extremity, gag intact, corneals intact. Patient constantly shivering.   NIH ~25. LKN: 10:45PM. PreMRS (from time of LKN: 4 - pt intubated, critical)    Impression: Acute change in mentation and functional status with R gaze preference, generalized shivering, not following commands, concerning for possible acute ischemic event vs encephalopathy vs seizure in the setting of critically ill patient with multiple comorbidities including recent cardiogenic shock requiring recent ECMO and now IABP, sepsis/fever, acute pancreatitis.       Recommendations:  [] stat CTP and CTA head and neck to evaluate if LVO present  update no LVO (therefore not an MT candidate) seen on CTA, no core seen on CTP - follow up on official read.   [] MRI brain without contrast when able  [] TTE w/ bubble   [] AC per primary team and nsgy for now  [] DVT prophylaxis per primary team  [] NPO after extubation unless passes dysphagia screen; swallow eval if fails  [] Keep BP permissive up to 220/110 for 48 hours followed by gradual normotension over 2-3 days   [] Telemonitoring  [] Neurological checks and vital signs per unit protocol  [] BGM goals 140-180  [] PT/OT; S/S evaluation  [] Continue toxic metabolic infectious workup per primary team  [] vEEG monitoring.   [] Discussed with epilepsy fellow: Patient unlikely to be seizing given overall picture, but can try ativan 2mg challenge and see if any change.     Case and plan discussed with stroke fellow Dr. Hutson, under supervision of stroke attending.    * Case and plan not final until Attending attestation *   Assessment: Per chart review: Patient EDUARDO REAL is a 62y (1960) man with a hx of CAD s/p 4v CABG ~2019 in [facility], DMII A1c 7.3%, HTN, HLD, who originally presented as a transfer from [facility] with concern for STEMI, had presented to [facility] with abdominal pain, n/v, was found to have pancreatitis and elevated troponins. CT abdomen revealed acute pancreatitis and his initial LVEF was 40-45%. He developed MSOF with hypoxic respiratory failure requiring intubation and ERIC and went into hypoxic PEA arrest requiring ACLS and due to persistent hypoxia and hypotension on pressors after ROSC was cannulated to [facility] ECMO on 11/25. Notably CTH that day revealed small subdural hemorrhage. His post arrest TTE on 11/26 showed a significantly worse LVEF of 5-10% with an AV that was only minimally opening.  Per consult received over the phone: Patient s/p fulminant cardiogenic shock had been placed on ECMO, now off of ECMO and on balloon pump, was on anticoagulation entire time. Had been noted to have a small SDH on 11/25. Patient was following commands. On 12/11/22, patient noted to have an acute change: no longer following any commands, not moving arms or legs b/l, not withdrawing to pain, does track with eyes, but not responding to questions or commands. Last seen at baseline by nurse at 10:45PM. Anticoagulation was held, NSGY was consulted, CTH was done STAT, patient was okayed by neurosurgery to continue back on anticoagulation, was placed back on a heparin drip. Neurology was consulted.  CTH noncontrast looks similar to prior, awaiting official read.   Labs: WBC 18. HGB 9.2. MCV 89. RDW 15. . INR 1.18. PT 13.7. PTT 30.7. Chance 134. BUN 25. Cr 1.55. Glu 100. Ca 8.3. Alb 2.8. AlkP 134. Amylase 206. Lipase 404. LDL 63. A1c 7.3.  . /45. RR 30. O2 100%. Temp 100.8 Patient on heparin drip, insulin, milrinone. Febrile, ID following, pt on vancomycin and cefepime.    Exam: Patient intubated, currently on heparin drip, was recently on precedex drip. Eyes open but not tracking, pupils equal, right gaze preference, not following commands, no spontaneous movement of any extremity, no withdrawal to pain or grimace in any extremity, gag intact, corneals intact. Patient constantly shivering.   NIH ~25. LKN: 10:45PM. PreMRS (from time of LKN: 4 - pt intubated, critical)    Impression: Acute change in mentation and functional status with R gaze preference, generalized shivering, not following commands, concerning for possible acute ischemic event vs encephalopathy vs seizure in the setting of critically ill patient with multiple comorbidities including recent cardiogenic shock requiring recent ECMO and now IABP, sepsis/fever, acute pancreatitis.       Recommendations:  [] stat CTP and CTA head and neck to evaluate if LVO present  update no LVO (therefore not an MT candidate) seen on CTA, no core seen on CTP - follow up on official read.   [] MRI brain without contrast when able  [] TTE w/ bubble   [] AC per primary team and nsgy for now  [] DVT prophylaxis per primary team  [] NPO after extubation unless passes dysphagia screen; swallow eval if fails  [] Keep BP permissive up to 220/110 for 48 hours followed by gradual normotension over 2-3 days   [] Telemonitoring  [] Neurological checks and vital signs per unit protocol  [] BGM goals 140-180  [] PT/OT; S/S evaluation  [] Continue toxic metabolic infectious workup per primary team  [] vEEG monitoring.   [] Discussed with epilepsy fellow: Patient unlikely to be seizing given overall picture, but can try ativan 2mg challenge and see if any change.     Case and plan discussed with stroke fellow Dr. Hutson, under supervision of stroke attending.    * Case and plan not final until Attending attestation *   Assessment: Per chart review: Patient EDUARDO REAL is a 62y (1960) man with a hx of CAD s/p 4v CABG ~2019 in [facility], DMII A1c 7.3%, HTN, HLD, who originally presented as a transfer from [facility] with concern for STEMI, had presented to [facility] with abdominal pain, n/v, was found to have pancreatitis and elevated troponins. CT abdomen revealed acute pancreatitis and his initial LVEF was 40-45%. He developed MSOF with hypoxic respiratory failure requiring intubation and ERIC and went into hypoxic PEA arrest requiring ACLS and due to persistent hypoxia and hypotension on pressors after ROSC was cannulated to [facility] ECMO on 11/25. Notably CTH that day revealed small subdural hemorrhage. His post arrest TTE on 11/26 showed a significantly worse LVEF of 5-10% with an AV that was only minimally opening.  Per consult received over the phone: Patient s/p fulminant cardiogenic shock had been placed on ECMO, now off of ECMO and on balloon pump, was on anticoagulation entire time. Had been noted to have a small SDH on 11/25. Patient was following commands. On 12/11/22, patient noted to have an acute change: no longer following any commands, not moving arms or legs b/l, not withdrawing to pain, does track with eyes, but not responding to questions or commands. Last seen at baseline by nurse at 10:45PM. Anticoagulation was held, NSGY was consulted, CTH was done STAT, patient was okayed by neurosurgery to continue back on anticoagulation, was placed back on a heparin drip. Neurology was consulted.  CTH noncontrast looks similar to prior, awaiting official read.   Labs: WBC 18. HGB 9.2. MCV 89. RDW 15. . INR 1.18. PT 13.7. PTT 30.7. Chance 134. BUN 25. Cr 1.55. Glu 100. Ca 8.3. Alb 2.8. AlkP 134. Amylase 206. Lipase 404. LDL 63. A1c 7.3.  . /45. RR 30. O2 100%. Temp 100.8 Patient on heparin drip, insulin, milrinone. Febrile, ID following, pt on vancomycin and cefepime.    Exam: Patient intubated, currently on heparin drip, was recently on precedex drip. Eyes open but not tracking, pupils equal, right gaze preference, not following commands, no spontaneous movement of any extremity, no withdrawal to pain or grimace in any extremity, gag intact, corneals intact. Patient constantly shivering.   NIH ~25. LKN: 10:45PM. PreMRS (from time of LKN: 4 - pt intubated, critical)    Impression: Acute change in mentation and functional status with R gaze preference, generalized shivering, not following commands, concerning for possible acute ischemic event vs encephalopathy vs seizure in the setting of critically ill patient with multiple comorbidities including recent cardiogenic shock requiring recent ECMO and now IABP, sepsis/fever, acute pancreatitis.       Recommendations:  [] stat CTP and CTA head and neck to evaluate if LVO present  update no LVO (therefore not an MT candidate) seen on CTA, no core seen on CTP - follow up on official read.   [] MRI brain without contrast when able  [] TTE w/ bubble   [] AC per primary team and nsgy for now  [] DVT prophylaxis per primary team  [] NPO after extubation unless passes dysphagia screen; swallow eval if fails  [] Keep BP permissive up to 220/110 for 48 hours followed by gradual normotension over 2-3 days   [] Telemonitoring  [] Neurological checks and vital signs per unit protocol  [] BGM goals 140-180  [] PT/OT; S/S evaluation  [] Continue toxic metabolic infectious workup per primary team  [] vEEG monitoring.   [] Discussed with epilepsy fellow: Patient unlikely to be seizing given overall picture, but can try ativan 2mg challenge and see if any change.     Case and plan discussed with stroke fellow Dr. Hutson, under supervision of stroke attending.    * Case and plan not final until Attending attestation *

## 2022-12-12 NOTE — PROGRESS NOTE ADULT - SUBJECTIVE AND OBJECTIVE BOX
Patient seen and examined at the bedside.    Remained critically ill on continuous ICU monitoring.    Brief Summary:  Cardiogenic shock s/p [facility] ECMO decannulated 12/8    24 Hour events:  -    OBJECTIVE:  Vital Signs Last 24 Hrs  T(C): 36.9 (12 Dec 2022 08:00), Max: 38.2 (12 Dec 2022 01:45)  T(F): 98.4 (12 Dec 2022 08:00), Max: 100.8 (12 Dec 2022 01:45)  HR: 109 (12 Dec 2022 08:15) (75 - 170)  BP: --  BP(mean): --  RR: 23 (12 Dec 2022 08:15) (8 - 32)  SpO2: 100% (12 Dec 2022 08:15) (96% - 100%)    Parameters below as of 12 Dec 2022 08:00  Patient On (Oxygen Delivery Method): ventilator    O2 Concentration (%): 40    Mode: AC/ CMV (Assist Control/ Continuous Mandatory Ventilation)  RR (machine): 14  FiO2: 40  PEEP: 5  ITime: 1.2  MAP: 15  PC: 18  PIP: 26            -------------------------------------------------------------------------------------------------------------------------------  Physical Exam:   General: Intubated, multiple lines gtt  Neurology: on/off sedation  Eyes: bilateral pupils equal and reactive   ENT/Neck: +ETT midline, Neck supple, trachea midline, No JVD   Respiratory: rhonchi bilaterally   CV: S1S2, no murmurs        [x] Afib  Abdominal: Softly distended,+BS   Extremities: 1+ pedal edema noted, + peripheral pulses palpable, warm, L Fem IABP  Skin: No Rashes, Hematoma, Ecchymosis                           9.2    18.07 )-----------( 142      ( 12 Dec 2022 00:41 )             28.2     12-12    134<L>  |  99  |  25<H>  ----------------------------<  100<H>  3.8   |  20<L>  |  1.55<H>    Ca    8.3<L>      12 Dec 2022 00:41  Phos  3.4     12-12  Mg     2.1     12-12    TPro  6.2  /  Alb  2.8<L>  /  TBili  1.2  /  DBili  x   /  AST  44<H>  /  ALT  18  /  AlkPhos  134<H>  12-12    LIVER FUNCTIONS - ( 12 Dec 2022 00:41 )  Alb: 2.8 g/dL / Pro: 6.2 g/dL / ALK PHOS: 134 U/L / ALT: 18 U/L / AST: 44 U/L / GGT: x           PT/INR - ( 12 Dec 2022 00:41 )   PT: 13.7 sec;   INR: 1.18 ratio         PTT - ( 12 Dec 2022 00:41 )  PTT:30.7 sec  ABG - ( 12 Dec 2022 00:22 )  pH, Arterial: 7.48  pH, Blood: x     /  pCO2: 29    /  pO2: 125   / HCO3: 22    / Base Excess: -1.3  /  SaO2: 98.6    ------------------------------------------------------------------------------------------------------------------------------  Assessment:  63 y/o male with PMH of CABG, DM, HTN, HLD presenting as transfer from [place] for c/f STEMI. PTA arrival received 325 aspirin, 600 plavix, and no heparin drip started. Around 1:30 am patient had multiple episodes of non-bloody diarrhea and emesis with associated abdominal pain and chest pain, unable to localize, non-radiating, with associated SOB and no associated palpitations or diaphoresis. No recent fevers/chills, cough, rashes, or changes in urination. Does report mild diffuse back pain; started 5 days ago in setting on injury while walking and lifting objects. Denies focal weakness, numbness/tingling, or LOC due does report mild dizziness.    acute respiratory distress/failure, intubated ,   cardiogenic shock s/p VA ECMO decannulated 12/8  CAD/ASHD/CABG, acute MI  cardiogenic shock  Hemodynamic instability  acute renal failure  encounter management IABP  encounter weaning ventilator  Leukocytosis   Thrombocytopenia  Anemia  acute pancreatitis      Plan:   ***Neuro***  CT head showed 4mm subdural hematoma 11/26: repeat CT head unchanged 12/01  Soft palate laceration, ENT scoped 12/8, stable, no acute intervention at this time  Post operative neuro assessment when sedation / anesthesia has cleared.    ***Cardiovascular***  At high risk for hemodynamic instability and cardiac arrhythmias.  IVF for perioperative hypovolemia.  Lactate 1.3, continue trending   Wean pressors for MAP > 65 mm Hg   L Fem IABP 1:1 with good augmentation   IV Primacor infusion for inotropic support   AC Therapy with Heparin gtt pAF/flutter PTT 44  PO Amiodarone for rate control    ***Pulmonary***  Postoperative acute respiratory insufficiency  Wean ventilator as tolerated.  Deep breathing and coughing exercises.  Wean oxygen as able.    ***GI***  Protein calorie malnutrition - Tube feeds via nasal feeding tube.  Protonix for stress ulcer prophylaxis   Bowel regimen with Miralax and Senna    ***Renal***  ERIC, renal following CVVHD started 12/5  De Dios catheter for strict I/O measurements.  Replete electrolytes as indicated.    ***ID***  Empiric coverage with Vancomycin and meropenem   Diflucan for prophiolaxia   Plan to CT to rule out infection     ***Endocrine***  Hx of DM2, continue glycemic control w/ ISS.        Patient requires continuous monitoring with bedside rhythm monitoring, pulse oximetry monitoring, and continuous central venous and arterial pressure monitoring; and intermittent blood gas analysis. Care plan discussed with the ICU care team.   Patient remained critical, at risk for life threatening decompensation.    I have spent 30 minutes providing critical care management to this patient.    By signing my name below, I, Rosio Alonzo, attest that this documentation has been prepared under the direction and in the presence of Jade Rosas MD  Electronically signed: Rosio Brian Alonzo, 12-12-22 @ 08:39    I, Jade Rosas MD, personally performed the services described in this documentation. all medical record entries made by the scribe were at my direction and in my presence. I have reviewed the chart and agree that the record reflects my personal performance and is accurate and complete  Electronically signed: Jade Rosas MD Patient seen and examined at the bedside.    Remained critically ill on continuous ICU monitoring.    Brief Summary:  Cardiogenic shock s/p VA ECMO decannulated 12/8    24 Hour events:  -    OBJECTIVE:  Vital Signs Last 24 Hrs  T(C): 36.9 (12 Dec 2022 08:00), Max: 38.2 (12 Dec 2022 01:45)  T(F): 98.4 (12 Dec 2022 08:00), Max: 100.8 (12 Dec 2022 01:45)  HR: 109 (12 Dec 2022 08:15) (75 - 170)  BP: --  BP(mean): --  RR: 23 (12 Dec 2022 08:15) (8 - 32)  SpO2: 100% (12 Dec 2022 08:15) (96% - 100%)    Parameters below as of 12 Dec 2022 08:00  Patient On (Oxygen Delivery Method): ventilator    O2 Concentration (%): 40    Mode: AC/ CMV (Assist Control/ Continuous Mandatory Ventilation)  RR (machine): 14  FiO2: 40  PEEP: 5  ITime: 1.2  MAP: 15  PC: 18  PIP: 26            -------------------------------------------------------------------------------------------------------------------------------  Physical Exam:   General: Intubated, multiple lines gtt  Neurology: on/off sedation  Eyes: bilateral pupils equal and reactive   ENT/Neck: +ETT midline, Neck supple, trachea midline, No JVD   Respiratory: rhonchi bilaterally   CV: S1S2, no murmurs        [x] Afib  Abdominal: Softly distended,+BS   Extremities: 1+ pedal edema noted, + peripheral pulses palpable, warm, L Fem IABP  Skin: No Rashes, Hematoma, Ecchymosis                           9.2    18.07 )-----------( 142      ( 12 Dec 2022 00:41 )             28.2     12-12    134<L>  |  99  |  25<H>  ----------------------------<  100<H>  3.8   |  20<L>  |  1.55<H>    Ca    8.3<L>      12 Dec 2022 00:41  Phos  3.4     12-12  Mg     2.1     12-12    TPro  6.2  /  Alb  2.8<L>  /  TBili  1.2  /  DBili  x   /  AST  44<H>  /  ALT  18  /  AlkPhos  134<H>  12-12    LIVER FUNCTIONS - ( 12 Dec 2022 00:41 )  Alb: 2.8 g/dL / Pro: 6.2 g/dL / ALK PHOS: 134 U/L / ALT: 18 U/L / AST: 44 U/L / GGT: x           PT/INR - ( 12 Dec 2022 00:41 )   PT: 13.7 sec;   INR: 1.18 ratio         PTT - ( 12 Dec 2022 00:41 )  PTT:30.7 sec  ABG - ( 12 Dec 2022 00:22 )  pH, Arterial: 7.48  pH, Blood: x     /  pCO2: 29    /  pO2: 125   / HCO3: 22    / Base Excess: -1.3  /  SaO2: 98.6    ------------------------------------------------------------------------------------------------------------------------------  Assessment:  61 y/o male with PMH of CABG, DM, HTN, HLD presenting as transfer from [place] for c/f STEMI. PTA arrival received 325 aspirin, 600 plavix, and no heparin drip started. Around 1:30 am patient had multiple episodes of non-bloody diarrhea and emesis with associated abdominal pain and chest pain, unable to localize, non-radiating, with associated SOB and no associated palpitations or diaphoresis. No recent fevers/chills, cough, rashes, or changes in urination. Does report mild diffuse back pain; started 5 days ago in setting on injury while walking and lifting objects. Denies focal weakness, numbness/tingling, or LOC due does report mild dizziness.    acute respiratory distress/failure, intubated ,   cardiogenic shock s/p VA ECMO decannulated 12/8  CAD/ASHD/CABG, acute MI  cardiogenic shock  Hemodynamic instability  acute renal failure  encounter management IABP  encounter weaning ventilator  Leukocytosis   Thrombocytopenia  Anemia  acute pancreatitis      Plan:   ***Neuro***  CT head showed 4mm subdural hematoma 11/26: repeat CT head unchanged 12/01  Soft palate laceration, ENT scoped 12/8, stable, no acute intervention at this time  Post operative neuro assessment when sedation / anesthesia has cleared.    ***Cardiovascular***  At high risk for hemodynamic instability and cardiac arrhythmias.  IVF for perioperative hypovolemia.  Lactate 1.3, continue trending   Wean pressors for MAP > 65 mm Hg   L Fem IABP 1:1 with good augmentation   IV Primacor infusion for inotropic support   AC Therapy with Heparin gtt pAF/flutter PTT 44  PO Amiodarone for rate control    ***Pulmonary***  Postoperative acute respiratory insufficiency  Wean ventilator as tolerated.  Deep breathing and coughing exercises.  Wean oxygen as able.    ***GI***  Protein calorie malnutrition - Tube feeds via nasal feeding tube.  Protonix for stress ulcer prophylaxis   Bowel regimen with Miralax and Senna    ***Renal***  ERIC, renal following CVVHD started 12/5  De Dios catheter for strict I/O measurements.  Replete electrolytes as indicated.    ***ID***  Empiric coverage with Vancomycin and meropenem   Diflucan for prophiolaxia   Plan to CT to rule out infection     ***Endocrine***  Hx of DM2, continue glycemic control w/ ISS.        Patient requires continuous monitoring with bedside rhythm monitoring, pulse oximetry monitoring, and continuous central venous and arterial pressure monitoring; and intermittent blood gas analysis. Care plan discussed with the ICU care team.   Patient remained critical, at risk for life threatening decompensation.    I have spent 30 minutes providing critical care management to this patient.    By signing my name below, I, Rosio Alonzo, attest that this documentation has been prepared under the direction and in the presence of Jade Rosas MD  Electronically signed: Rosio Brian Alonzo, 12-12-22 @ 08:39    I, Jade Rosas MD, personally performed the services described in this documentation. all medical record entries made by the scribe were at my direction and in my presence. I have reviewed the chart and agree that the record reflects my personal performance and is accurate and complete  Electronically signed: Jade Rosas MD Patient seen and examined at the bedside.    Remained critically ill on continuous ICU monitoring.    Brief Summary:  Cardiogenic shock s/p VA ECMO decannulated 12/8    24 Hour events:  -    OBJECTIVE:  Vital Signs Last 24 Hrs  T(C): 36.9 (12 Dec 2022 08:00), Max: 38.2 (12 Dec 2022 01:45)  T(F): 98.4 (12 Dec 2022 08:00), Max: 100.8 (12 Dec 2022 01:45)  HR: 109 (12 Dec 2022 08:15) (75 - 170)  BP: --  BP(mean): --  RR: 23 (12 Dec 2022 08:15) (8 - 32)  SpO2: 100% (12 Dec 2022 08:15) (96% - 100%)    Parameters below as of 12 Dec 2022 08:00  Patient On (Oxygen Delivery Method): ventilator    O2 Concentration (%): 40    Mode: AC/ CMV (Assist Control/ Continuous Mandatory Ventilation)  RR (machine): 14  FiO2: 40  PEEP: 5  ITime: 1.2  MAP: 15  PC: 18  PIP: 26            -------------------------------------------------------------------------------------------------------------------------------  Physical Exam:   General: Intubated, multiple lines gtt  Neurology: on/off sedation  Eyes: bilateral pupils equal and reactive   ENT/Neck: +ETT midline, Neck supple, trachea midline, No JVD   Respiratory: rhonchi bilaterally   CV: S1S2, no murmurs        [x] Afib  Abdominal: Softly distended,+BS   Extremities: 1+ pedal edema noted, + peripheral pulses palpable, warm, L Fem IABP  Skin: No Rashes, Hematoma, Ecchymosis                           9.2    18.07 )-----------( 142      ( 12 Dec 2022 00:41 )             28.2     12-12    134<L>  |  99  |  25<H>  ----------------------------<  100<H>  3.8   |  20<L>  |  1.55<H>    Ca    8.3<L>      12 Dec 2022 00:41  Phos  3.4     12-12  Mg     2.1     12-12    TPro  6.2  /  Alb  2.8<L>  /  TBili  1.2  /  DBili  x   /  AST  44<H>  /  ALT  18  /  AlkPhos  134<H>  12-12    LIVER FUNCTIONS - ( 12 Dec 2022 00:41 )  Alb: 2.8 g/dL / Pro: 6.2 g/dL / ALK PHOS: 134 U/L / ALT: 18 U/L / AST: 44 U/L / GGT: x           PT/INR - ( 12 Dec 2022 00:41 )   PT: 13.7 sec;   INR: 1.18 ratio         PTT - ( 12 Dec 2022 00:41 )  PTT:30.7 sec  ABG - ( 12 Dec 2022 00:22 )  pH, Arterial: 7.48  pH, Blood: x     /  pCO2: 29    /  pO2: 125   / HCO3: 22    / Base Excess: -1.3  /  SaO2: 98.6    ------------------------------------------------------------------------------------------------------------------------------  Assessment:  61 y/o male with PMH of CABG, DM, HTN, HLD presenting as transfer from [place] for c/f STEMI. PTA arrival received 325 aspirin, 600 plavix, and no heparin drip started. Around 1:30 am patient had multiple episodes of non-bloody diarrhea and emesis with associated abdominal pain and chest pain, unable to localize, non-radiating, with associated SOB and no associated palpitations or diaphoresis. No recent fevers/chills, cough, rashes, or changes in urination. Does report mild diffuse back pain; started 5 days ago in setting on injury while walking and lifting objects. Denies focal weakness, numbness/tingling, or LOC due does report mild dizziness.    acute respiratory distress/failure, intubated ,   cardiogenic shock s/p VA ECMO decannulated 12/8  CAD/ASHD/CABG, acute MI  cardiogenic shock  Hemodynamic instability  acute renal failure  encounter management IABP  encounter weaning ventilator  Leukocytosis   Thrombocytopenia  Anemia  acute pancreatitis      Plan:   ***Neuro***  CT head showed 4mm subdural hematoma 11/26: repeat CT head unchanged 12/01  Soft palate laceration, ENT scoped 12/8, stable, no acute intervention at this time  Post operative neuro assessment when sedation / anesthesia has cleared.    ***Cardiovascular***  At high risk for hemodynamic instability and cardiac arrhythmias.  IVF for perioperative hypovolemia.  Lactate 1.3, continue trending   Wean pressors for MAP > 65 mm Hg   L Fem IABP 1:1 with good augmentation   IV Primacor infusion for inotropic support   AC Therapy with Heparin gtt pAF/flutter PTT 44  PO Amiodarone for rate control    ***Pulmonary***  Postoperative acute respiratory insufficiency  Wean ventilator as tolerated.  Deep breathing and coughing exercises.  Wean oxygen as able.    ***GI***  Protein calorie malnutrition - Tube feeds via nasal feeding tube.  Protonix for stress ulcer prophylaxis   Bowel regimen with Miralax and Senna    ***Renal***  ERIC, renal following CVVHD started 12/5  De Dios catheter for strict I/O measurements.  Replete electrolytes as indicated.    ***ID***  Empiric coverage with Vancomycin and meropenem   Diflucan for prophiolaxia   Plan to CT to rule out infection     ***Endocrine***  Hx of DM2, continue glycemic control w/ ISS.        Patient requires continuous monitoring with bedside rhythm monitoring, pulse oximetry monitoring, and continuous central venous and arterial pressure monitoring; and intermittent blood gas analysis. Care plan discussed with the ICU care team.   Patient remained critical, at risk for life threatening decompensation.    I have spent 30 minutes providing critical care management to this patient.    By signing my name below, I, Rosio Alonzo, attest that this documentation has been prepared under the direction and in the presence of Jade Rosas MD  Electronically signed: Rosio Brian Alonzo, 12-12-22 @ 08:39    I, Jade Rosas MD, personally performed the services described in this documentation. all medical record entries made by the scribe were at my direction and in my presence. I have reviewed the chart and agree that the record reflects my personal performance and is accurate and complete  Electronically signed: Jade Rosas MD Patient seen and examined at the bedside.    Remained critically ill on continuous ICU monitoring.    Brief Summary:  Cardiogenic shock s/p VA ECMO decannulated 12/8/22.     24 Hour events:  - EEG report pending will f/u   - No pressor requirements as of now   - Plan to up titrate AC therapy     OBJECTIVE:  Vital Signs Last 24 Hrs  T(C): 36.9 (12 Dec 2022 08:00), Max: 38.2 (12 Dec 2022 01:45)  T(F): 98.4 (12 Dec 2022 08:00), Max: 100.8 (12 Dec 2022 01:45)  HR: 109 (12 Dec 2022 08:15) (75 - 170)  BP: --  BP(mean): --  RR: 23 (12 Dec 2022 08:15) (8 - 32)  SpO2: 100% (12 Dec 2022 08:15) (96% - 100%)    Parameters below as of 12 Dec 2022 08:00  Patient On (Oxygen Delivery Method): ventilator    O2 Concentration (%): 40    Mode: AC/ CMV (Assist Control/ Continuous Mandatory Ventilation)  RR (machine): 14  FiO2: 40  PEEP: 5  ITime: 1.2  MAP: 15  PC: 18  PIP: 26            Physical Exam:   General: Intubated, multiple lines gtt  Neurology: on/off sedation  Eyes: bilateral pupils equal and reactive   ENT/Neck: +ETT midline, Neck supple, trachea midline, No JVD   Respiratory: rhonchi bilaterally   CV: S1S2, no murmurs        [x] Afib  Abdominal: Softly distended,+BS   Extremities: 1+ pedal edema noted, + peripheral pulses palpable, warm, L Fem IABP  Skin: No Rashes, Hematoma, Ecchymosis   -------------------------------------------------------------------------------------------------------------------------------                        9.2    18.07 )-----------( 142      ( 12 Dec 2022 00:41 )             28.2     12-12    134<L>  |  99  |  25<H>  ----------------------------<  100<H>  3.8   |  20<L>  |  1.55<H>    Ca    8.3<L>      12 Dec 2022 00:41  Phos  3.4     12-12  Mg     2.1     12-12    TPro  6.2  /  Alb  2.8<L>  /  TBili  1.2  /  DBili  x   /  AST  44<H>  /  ALT  18  /  AlkPhos  134<H>  12-12    LIVER FUNCTIONS - ( 12 Dec 2022 00:41 )  Alb: 2.8 g/dL / Pro: 6.2 g/dL / ALK PHOS: 134 U/L / ALT: 18 U/L / AST: 44 U/L / GGT: x           PT/INR - ( 12 Dec 2022 00:41 )   PT: 13.7 sec;   INR: 1.18 ratio         PTT - ( 12 Dec 2022 00:41 )  PTT:30.7 sec  ABG - ( 12 Dec 2022 00:22 )  pH, Arterial: 7.48  pH, Blood: x     /  pCO2: 29    /  pO2: 125   / HCO3: 22    / Base Excess: -1.3  /  SaO2: 98.6    ------------------------------------------------------------------------------------------------------------------------------  Assessment:  61 y/o male with PMH of CABG, DM, HTN, HLD presenting as transfer from [place] for c/f STEMI. PTA arrival received 325 aspirin, 600 plavix, and no heparin drip started. Around 1:30 am patient had multiple episodes of non-bloody diarrhea and emesis with associated abdominal pain and chest pain, unable to localize, non-radiating, with associated SOB and no associated palpitations or diaphoresis. No recent fevers/chills, cough, rashes, or changes in urination. Does report mild diffuse back pain; started 5 days ago in setting on injury while walking and lifting objects. Denies focal weakness, numbness/tingling, or LOC due does report mild dizziness.    acute respiratory distress/failure, intubated    cardiogenic shock s/p VA ECMO decannulated 12/8  CAD/ASHD/CABG, acute MI  cardiogenic shock  Hemodynamic instability  acute renal failure  encounter management IABP  encounter weaning ventilator  Leukocytosis   Thrombocytopenia  Anemia  acute pancreatitis      Plan:   ***Neuro***  CT head showed 4mm subdural hematoma 11/26: repeat CT head unchanged 12/01  Soft palate laceration, ENT scoped 12/8, stable, no acute intervention at this time  Post operative neuro assessment when sedation / anesthesia has cleared.    ***Cardiovascular***  At high risk for hemodynamic instability and cardiac arrhythmias.  IVF for perioperative hypovolemia.  Lactate 1.3, continue trending   Wean pressors for MAP > 65 mm Hg   L Fem IABP 1:1 with good augmentation   IV Primacor infusion for inotropic support   AC Therapy with Heparin gtt pAF/flutter PTT 44  PO Amiodarone for rate control    ***Pulmonary***  Postoperative acute respiratory insufficiency  Wean ventilator as tolerated.  Deep breathing and coughing exercises.  Wean oxygen as able.    ***GI***  Protein calorie malnutrition - Tube feeds via nasal feeding tube.  Protonix for stress ulcer prophylaxis   Bowel regimen with Miralax and Senna    ***Renal***  ERIC, renal following CVVHD started 12/5  De Dios catheter for strict I/O measurements.  Replete electrolytes as indicated.    ***ID***  Empiric coverage with Vancomycin and meropenem   Diflucan for prophiolaxia   Plan to CT to rule out infection     ***Endocrine***  Hx of DM2, continue glycemic control w/ ISS.        Patient requires continuous monitoring with bedside rhythm monitoring, pulse oximetry monitoring, and continuous central venous and arterial pressure monitoring; and intermittent blood gas analysis. Care plan discussed with the ICU care team.   Patient remained critical, at risk for life threatening decompensation.    I have spent 30 minutes providing critical care management to this patient.    By signing my name below, I, Rosio Alonzo, attest that this documentation has been prepared under the direction and in the presence of Jade Rosas MD  Electronically signed: Brian Burnett, 12-12-22 @ 08:39    I, Jade Rosas MD, personally performed the services described in this documentation. all medical record entries made by the scribe were at my direction and in my presence. I have reviewed the chart and agree that the record reflects my personal performance and is accurate and complete  Electronically signed: Jade Rosas MD Patient seen and examined at the bedside.    Remained critically ill on continuous ICU monitoring.    Brief Summary:  Cardiogenic shock s/p VA ECMO decannulated 12/8/22.     24 Hour events:  - EEG report pending will f/u   - No pressor requirements as of now   - Plan to up titrate AC therapy     OBJECTIVE:  Vital Signs Last 24 Hrs  T(C): 36.9 (12 Dec 2022 08:00), Max: 38.2 (12 Dec 2022 01:45)  T(F): 98.4 (12 Dec 2022 08:00), Max: 100.8 (12 Dec 2022 01:45)  HR: 109 (12 Dec 2022 08:15) (75 - 170)  BP: --  BP(mean): --  RR: 23 (12 Dec 2022 08:15) (8 - 32)  SpO2: 100% (12 Dec 2022 08:15) (96% - 100%)    Parameters below as of 12 Dec 2022 08:00  Patient On (Oxygen Delivery Method): ventilator    O2 Concentration (%): 40    Mode: AC/ CMV (Assist Control/ Continuous Mandatory Ventilation)  RR (machine): 14  FiO2: 40  PEEP: 5  ITime: 1.2  MAP: 15  PC: 18  PIP: 26            Physical Exam:   General: Intubated, multiple lines gtt  Neurology: on/off sedation  Eyes: bilateral pupils equal and reactive   ENT/Neck: +ETT midline, Neck supple, trachea midline, No JVD   Respiratory: rhonchi bilaterally   CV: S1S2, no murmurs        [x] Afib  Abdominal: Softly distended,+BS   Extremities: 1+ pedal edema noted, + peripheral pulses palpable, warm, L Fem IABP  Skin: No Rashes, Hematoma, Ecchymosis   -------------------------------------------------------------------------------------------------------------------------------                        9.2    18.07 )-----------( 142      ( 12 Dec 2022 00:41 )             28.2     12-12    134<L>  |  99  |  25<H>  ----------------------------<  100<H>  3.8   |  20<L>  |  1.55<H>    Ca    8.3<L>      12 Dec 2022 00:41  Phos  3.4     12-12  Mg     2.1     12-12    TPro  6.2  /  Alb  2.8<L>  /  TBili  1.2  /  DBili  x   /  AST  44<H>  /  ALT  18  /  AlkPhos  134<H>  12-12    LIVER FUNCTIONS - ( 12 Dec 2022 00:41 )  Alb: 2.8 g/dL / Pro: 6.2 g/dL / ALK PHOS: 134 U/L / ALT: 18 U/L / AST: 44 U/L / GGT: x           PT/INR - ( 12 Dec 2022 00:41 )   PT: 13.7 sec;   INR: 1.18 ratio         PTT - ( 12 Dec 2022 00:41 )  PTT:30.7 sec  ABG - ( 12 Dec 2022 00:22 )  pH, Arterial: 7.48  pH, Blood: x     /  pCO2: 29    /  pO2: 125   / HCO3: 22    / Base Excess: -1.3  /  SaO2: 98.6    ------------------------------------------------------------------------------------------------------------------------------  Assessment:  63 y/o male with PMH of CABG, DM, HTN, HLD presenting as transfer from [place] for c/f STEMI. PTA arrival received 325 aspirin, 600 plavix, and no heparin drip started. Around 1:30 am patient had multiple episodes of non-bloody diarrhea and emesis with associated abdominal pain and chest pain, unable to localize, non-radiating, with associated SOB and no associated palpitations or diaphoresis. No recent fevers/chills, cough, rashes, or changes in urination. Does report mild diffuse back pain; started 5 days ago in setting on injury while walking and lifting objects. Denies focal weakness, numbness/tingling, or LOC due does report mild dizziness.    acute respiratory distress/failure, intubated    cardiogenic shock s/p VA ECMO decannulated 12/8  CAD/ASHD/CABG, acute MI  cardiogenic shock  Hemodynamic instability  acute renal failure  encounter management IABP  encounter weaning ventilator  Leukocytosis   Thrombocytopenia  Anemia  acute pancreatitis      Plan:   ***Neuro***  CT head showed 4mm subdural hematoma 11/26: repeat CT head unchanged 12/01  Soft palate laceration, ENT scoped 12/8, stable, no acute intervention at this time  Post operative neuro assessment when sedation / anesthesia has cleared.    ***Cardiovascular***  At high risk for hemodynamic instability and cardiac arrhythmias.  IVF for perioperative hypovolemia.  Lactate 1.3, continue trending   Wean pressors for MAP > 65 mm Hg   L Fem IABP 1:1 with good augmentation   IV Primacor infusion for inotropic support   AC Therapy with Heparin gtt pAF/flutter PTT 44  PO Amiodarone for rate control    ***Pulmonary***  Postoperative acute respiratory insufficiency  Wean ventilator as tolerated.  Deep breathing and coughing exercises.  Wean oxygen as able.    ***GI***  Protein calorie malnutrition - Tube feeds via nasal feeding tube.  Protonix for stress ulcer prophylaxis   Bowel regimen with Miralax and Senna    ***Renal***  ERIC, renal following CVVHD started 12/5  De Dios catheter for strict I/O measurements.  Replete electrolytes as indicated.    ***ID***  Empiric coverage with Vancomycin and meropenem   Diflucan for prophiolaxia   Plan to CT to rule out infection     ***Endocrine***  Hx of DM2, continue glycemic control w/ ISS.        Patient requires continuous monitoring with bedside rhythm monitoring, pulse oximetry monitoring, and continuous central venous and arterial pressure monitoring; and intermittent blood gas analysis. Care plan discussed with the ICU care team.   Patient remained critical, at risk for life threatening decompensation.    I have spent 30 minutes providing critical care management to this patient.    By signing my name below, I, Rosio Alonzo, attest that this documentation has been prepared under the direction and in the presence of Jade Rosas MD  Electronically signed: Brian Burnett, 12-12-22 @ 08:39    I, Jade Rosas MD, personally performed the services described in this documentation. all medical record entries made by the scribe were at my direction and in my presence. I have reviewed the chart and agree that the record reflects my personal performance and is accurate and complete  Electronically signed: Jade Rosas MD Patient seen and examined at the bedside.    Remained critically ill on continuous ICU monitoring.    Brief Summary:  Cardiogenic shock s/p VA ECMO decannulated 12/8/22.     24 Hour events:  - EEG report pending will f/u   - No pressor requirements as of now   - Plan to up titrate AC therapy     OBJECTIVE:  Vital Signs Last 24 Hrs  T(C): 36.9 (12 Dec 2022 08:00), Max: 38.2 (12 Dec 2022 01:45)  T(F): 98.4 (12 Dec 2022 08:00), Max: 100.8 (12 Dec 2022 01:45)  HR: 109 (12 Dec 2022 08:15) (75 - 170)  BP: --  BP(mean): --  RR: 23 (12 Dec 2022 08:15) (8 - 32)  SpO2: 100% (12 Dec 2022 08:15) (96% - 100%)    Parameters below as of 12 Dec 2022 08:00  Patient On (Oxygen Delivery Method): ventilator    O2 Concentration (%): 40    Mode: AC/ CMV (Assist Control/ Continuous Mandatory Ventilation)  RR (machine): 14  FiO2: 40  PEEP: 5  ITime: 1.2  MAP: 15  PC: 18  PIP: 26            Physical Exam:   General: Intubated, multiple lines gtt  Neurology: on/off sedation  Eyes: bilateral pupils equal and reactive   ENT/Neck: +ETT midline, Neck supple, trachea midline, No JVD   Respiratory: rhonchi bilaterally   CV: S1S2, no murmurs        [x] Afib  Abdominal: Softly distended,+BS   Extremities: 1+ pedal edema noted, + peripheral pulses palpable, warm, L Fem IABP  Skin: No Rashes, Hematoma, Ecchymosis   -------------------------------------------------------------------------------------------------------------------------------                        9.2    18.07 )-----------( 142      ( 12 Dec 2022 00:41 )             28.2     12-12    134<L>  |  99  |  25<H>  ----------------------------<  100<H>  3.8   |  20<L>  |  1.55<H>    Ca    8.3<L>      12 Dec 2022 00:41  Phos  3.4     12-12  Mg     2.1     12-12    TPro  6.2  /  Alb  2.8<L>  /  TBili  1.2  /  DBili  x   /  AST  44<H>  /  ALT  18  /  AlkPhos  134<H>  12-12    LIVER FUNCTIONS - ( 12 Dec 2022 00:41 )  Alb: 2.8 g/dL / Pro: 6.2 g/dL / ALK PHOS: 134 U/L / ALT: 18 U/L / AST: 44 U/L / GGT: x           PT/INR - ( 12 Dec 2022 00:41 )   PT: 13.7 sec;   INR: 1.18 ratio         PTT - ( 12 Dec 2022 00:41 )  PTT:30.7 sec  ABG - ( 12 Dec 2022 00:22 )  pH, Arterial: 7.48  pH, Blood: x     /  pCO2: 29    /  pO2: 125   / HCO3: 22    / Base Excess: -1.3  /  SaO2: 98.6    ------------------------------------------------------------------------------------------------------------------------------  Assessment:  61 y/o male with PMH of CABG, DM, HTN, HLD presenting as transfer from [place] for c/f STEMI. PTA arrival received 325 aspirin, 600 plavix, and no heparin drip started. Around 1:30 am patient had multiple episodes of non-bloody diarrhea and emesis with associated abdominal pain and chest pain, unable to localize, non-radiating, with associated SOB and no associated palpitations or diaphoresis. No recent fevers/chills, cough, rashes, or changes in urination. Does report mild diffuse back pain; started 5 days ago in setting on injury while walking and lifting objects. Denies focal weakness, numbness/tingling, or LOC due does report mild dizziness.    acute respiratory distress/failure, intubated    cardiogenic shock s/p VA ECMO decannulated 12/8  CAD/ASHD/CABG, acute MI  cardiogenic shock  Hemodynamic instability  acute renal failure  encounter management IABP  encounter weaning ventilator  Leukocytosis   Thrombocytopenia  Anemia  acute pancreatitis      Plan:   ***Neuro***  CT head showed 4mm subdural hematoma 11/26: repeat CT head unchanged 12/01  Soft palate laceration, ENT scoped 12/8, stable, no acute intervention at this time  Post operative neuro assessment when sedation / anesthesia has cleared.    ***Cardiovascular***  At high risk for hemodynamic instability and cardiac arrhythmias.  IVF for perioperative hypovolemia.  Lactate 1.3, continue trending   Wean pressors for MAP > 65 mm Hg   L Fem IABP 1:1 with good augmentation   IV Primacor infusion for inotropic support   AC Therapy with Heparin gtt pAF/flutter PTT 44  PO Amiodarone for rate control    ***Pulmonary***  Postoperative acute respiratory insufficiency  Wean ventilator as tolerated.  Deep breathing and coughing exercises.  Wean oxygen as able.    ***GI***  Protein calorie malnutrition - Tube feeds via nasal feeding tube.  Protonix for stress ulcer prophylaxis   Bowel regimen with Miralax and Senna    ***Renal***  ERIC, renal following CVVHD started 12/5  De Dios catheter for strict I/O measurements.  Replete electrolytes as indicated.    ***ID***  Empiric coverage with Vancomycin and meropenem   Diflucan for prophiolaxia   Plan to CT to rule out infection     ***Endocrine***  Hx of DM2, continue glycemic control w/ ISS.        Patient requires continuous monitoring with bedside rhythm monitoring, pulse oximetry monitoring, and continuous central venous and arterial pressure monitoring; and intermittent blood gas analysis. Care plan discussed with the ICU care team.   Patient remained critical, at risk for life threatening decompensation.    I have spent 30 minutes providing critical care management to this patient.    By signing my name below, I, Rosio Alonzo, attest that this documentation has been prepared under the direction and in the presence of Jade Rosas MD  Electronically signed: Brian Burnett, 12-12-22 @ 08:39    I, Jade Rosas MD, personally performed the services described in this documentation. all medical record entries made by the scribe were at my direction and in my presence. I have reviewed the chart and agree that the record reflects my personal performance and is accurate and complete  Electronically signed: Jade Rosas MD Patient seen and examined at the bedside.    Remained critically ill on continuous ICU monitoring.    Brief Summary:  Cardiogenic shock s/p VA ECMO decannulated 12/8/22.     24 Hour events:  - Acute change in neuro status, minimally responsive.  - Stroke code activated, CT head unremarkable, CTA with L MCA watershed infarct, but not consistent with exam.  - EEG ongoing.  - No pressor requirements as of now   - Plan to up titrate AC therapy     OBJECTIVE:  Vital Signs Last 24 Hrs  T(C): 36.9 (12 Dec 2022 08:00), Max: 38.2 (12 Dec 2022 01:45)  T(F): 98.4 (12 Dec 2022 08:00), Max: 100.8 (12 Dec 2022 01:45)  HR: 109 (12 Dec 2022 08:15) (75 - 170)  BP: --  BP(mean): --  RR: 23 (12 Dec 2022 08:15) (8 - 32)  SpO2: 100% (12 Dec 2022 08:15) (96% - 100%)    Parameters below as of 12 Dec 2022 08:00  Patient On (Oxygen Delivery Method): ventilator    O2 Concentration (%): 40    Mode: AC/ CMV (Assist Control/ Continuous Mandatory Ventilation)  RR (machine): 14  FiO2: 40  PEEP: 5  ITime: 1.2  MAP: 15  PC: 18  PIP: 26            Physical Exam:   General: Intubated, multiple lines gtt  Neurology: on/off sedation - Seems to track with eyes at times, not following commands, not withdrawing to noxious stimuli  Eyes: bilateral pupils equal and reactive   ENT/Neck: +ETT midline, Neck supple, trachea midline, No JVD   Respiratory: rhonchi bilaterally   CV: S1S2, no murmurs        [x] Afib  Abdominal: Softly distended, +BS   Extremities: 1+ pedal edema noted, + peripheral pulses palpable, warm, L Fem IABP  Skin: No Rashes, Hematoma, Ecchymosis   -------------------------------------------------------------------------------------------------------------------------------                        9.2    18.07 )-----------( 142      ( 12 Dec 2022 00:41 )             28.2     12-12    134<L>  |  99  |  25<H>  ----------------------------<  100<H>  3.8   |  20<L>  |  1.55<H>    Ca    8.3<L>      12 Dec 2022 00:41  Phos  3.4     12-12  Mg     2.1     12-12    TPro  6.2  /  Alb  2.8<L>  /  TBili  1.2  /  DBili  x   /  AST  44<H>  /  ALT  18  /  AlkPhos  134<H>  12-12    LIVER FUNCTIONS - ( 12 Dec 2022 00:41 )  Alb: 2.8 g/dL / Pro: 6.2 g/dL / ALK PHOS: 134 U/L / ALT: 18 U/L / AST: 44 U/L / GGT: x           PT/INR - ( 12 Dec 2022 00:41 )   PT: 13.7 sec;   INR: 1.18 ratio         PTT - ( 12 Dec 2022 00:41 )  PTT:30.7 sec  ABG - ( 12 Dec 2022 00:22 )  pH, Arterial: 7.48  pH, Blood: x     /  pCO2: 29    /  pO2: 125   / HCO3: 22    / Base Excess: -1.3  /  SaO2: 98.6    ------------------------------------------------------------------------------------------------------------------------------  Assessment:  61 y/o male with PMH of CABG, DM, HTN, HLD presenting as transfer from [place] for c/f STEMI. PTA arrival received 325 aspirin, 600 plavix, and no heparin drip started. Around 1:30 am patient had multiple episodes of non-bloody diarrhea and emesis with associated abdominal pain and chest pain, unable to localize, non-radiating, with associated SOB and no associated palpitations or diaphoresis. No recent fevers/chills, cough, rashes, or changes in urination. Does report mild diffuse back pain; started 5 days ago in setting on injury while walking and lifting objects. Denies focal weakness, numbness/tingling, or LOC due does report mild dizziness.    Acute respiratory distress/failure, intubated    Cardiogenic shock s/p VA ECMO decannulated 12/8  CAD/ASHD/CABG, acute MI  Cardiogenic shock  Hemodynamic instability  Acute kidney injury  encounter management IABP  encounter weaning ventilator  Leukocytosis   Thrombocytopenia  Anemia  Acute pancreatitis      Plan:   ***Neuro***  CT head showed 4mm subdural hematoma 11/26: repeat CT head unchanged 12/01  Soft palate laceration, ENT scoped 12/8, stable, no acute intervention at this time  Acute neuro change - Appreciate Neurology input  Follow up final EEG report.    ***Cardiovascular***  At high risk for hemodynamic instability and cardiac arrhythmias.  Lactate 1.3, continue trending   Continue low dose Milrinone for now.  L Fem IABP 1:1 with good augmentation   AC Therapy with Heparin gtt pAF/flutter PTT 44  Amiodarone for rate control    ***Pulmonary***  Acute respiratory failure  Wean ventilator as tolerated.    ***GI***  Protein calorie malnutrition - Tube feeds via nasal feeding tube.  Pancreatitis   Protonix for stress ulcer prophylaxis   Bowel regimen with Miralax and Senna    ***Renal***  ERIC, renal following, continue CRRT  Gentle volume removal.  De Dios catheter for strict I/O measurements.  Replete electrolytes as indicated.    ***ID***  Febrile, cultured  Vancomycin, Merrem, and Diflucan for now.  Follow up cultures.  Off Cefepime now in setting of neuro changes.    ***Endocrine***  Hx of DM2, continue glycemic control w/ ISS.        Patient requires continuous monitoring with bedside rhythm monitoring, pulse oximetry monitoring, and continuous central venous and arterial pressure monitoring; and intermittent blood gas analysis. Care plan discussed with the ICU care team.   Patient remained critical, at risk for life threatening decompensation.    I have spent 48 minutes providing critical care management to this patient.    By signing my name below, I, Rosio Cheri, attest that this documentation has been prepared under the direction and in the presence of Jade Rosas MD  Electronically signed: Rosio Alonzo Brian, 12-12-22 @ 08:39    I, Jade Rosas MD, personally performed the services described in this documentation. all medical record entries made by the scribe were at my direction and in my presence. I have reviewed the chart and agree that the record reflects my personal performance and is accurate and complete  Electronically signed: Jade Rosas MD Patient seen and examined at the bedside.    Remained critically ill on continuous ICU monitoring.    Brief Summary:  Cardiogenic shock s/p VA ECMO decannulated 12/8/22.     24 Hour events:  - Acute change in neuro status, minimally responsive.  - Stroke code activated, CT head unremarkable, CTA with L MCA watershed infarct, but not consistent with exam.  - EEG ongoing.  - No pressor requirements as of now   - Plan to up titrate AC therapy     OBJECTIVE:  Vital Signs Last 24 Hrs  T(C): 36.9 (12 Dec 2022 08:00), Max: 38.2 (12 Dec 2022 01:45)  T(F): 98.4 (12 Dec 2022 08:00), Max: 100.8 (12 Dec 2022 01:45)  HR: 109 (12 Dec 2022 08:15) (75 - 170)  BP: --  BP(mean): --  RR: 23 (12 Dec 2022 08:15) (8 - 32)  SpO2: 100% (12 Dec 2022 08:15) (96% - 100%)    Parameters below as of 12 Dec 2022 08:00  Patient On (Oxygen Delivery Method): ventilator    O2 Concentration (%): 40    Mode: AC/ CMV (Assist Control/ Continuous Mandatory Ventilation)  RR (machine): 14  FiO2: 40  PEEP: 5  ITime: 1.2  MAP: 15  PC: 18  PIP: 26            Physical Exam:   General: Intubated, multiple lines gtt  Neurology: on/off sedation - Seems to track with eyes at times, not following commands, not withdrawing to noxious stimuli  Eyes: bilateral pupils equal and reactive   ENT/Neck: +ETT midline, Neck supple, trachea midline, No JVD   Respiratory: rhonchi bilaterally   CV: S1S2, no murmurs        [x] Afib  Abdominal: Softly distended, +BS   Extremities: 1+ pedal edema noted, + peripheral pulses palpable, warm, L Fem IABP  Skin: No Rashes, Hematoma, Ecchymosis   -------------------------------------------------------------------------------------------------------------------------------                        9.2    18.07 )-----------( 142      ( 12 Dec 2022 00:41 )             28.2     12-12    134<L>  |  99  |  25<H>  ----------------------------<  100<H>  3.8   |  20<L>  |  1.55<H>    Ca    8.3<L>      12 Dec 2022 00:41  Phos  3.4     12-12  Mg     2.1     12-12    TPro  6.2  /  Alb  2.8<L>  /  TBili  1.2  /  DBili  x   /  AST  44<H>  /  ALT  18  /  AlkPhos  134<H>  12-12    LIVER FUNCTIONS - ( 12 Dec 2022 00:41 )  Alb: 2.8 g/dL / Pro: 6.2 g/dL / ALK PHOS: 134 U/L / ALT: 18 U/L / AST: 44 U/L / GGT: x           PT/INR - ( 12 Dec 2022 00:41 )   PT: 13.7 sec;   INR: 1.18 ratio         PTT - ( 12 Dec 2022 00:41 )  PTT:30.7 sec  ABG - ( 12 Dec 2022 00:22 )  pH, Arterial: 7.48  pH, Blood: x     /  pCO2: 29    /  pO2: 125   / HCO3: 22    / Base Excess: -1.3  /  SaO2: 98.6    ------------------------------------------------------------------------------------------------------------------------------  Assessment:  63 y/o male with PMH of CABG, DM, HTN, HLD presenting as transfer from [place] for c/f STEMI. PTA arrival received 325 aspirin, 600 plavix, and no heparin drip started. Around 1:30 am patient had multiple episodes of non-bloody diarrhea and emesis with associated abdominal pain and chest pain, unable to localize, non-radiating, with associated SOB and no associated palpitations or diaphoresis. No recent fevers/chills, cough, rashes, or changes in urination. Does report mild diffuse back pain; started 5 days ago in setting on injury while walking and lifting objects. Denies focal weakness, numbness/tingling, or LOC due does report mild dizziness.    Acute respiratory distress/failure, intubated    Cardiogenic shock s/p VA ECMO decannulated 12/8  CAD/ASHD/CABG, acute MI  Cardiogenic shock  Hemodynamic instability  Acute kidney injury  encounter management IABP  encounter weaning ventilator  Leukocytosis   Thrombocytopenia  Anemia  Acute pancreatitis      Plan:   ***Neuro***  CT head showed 4mm subdural hematoma 11/26: repeat CT head unchanged 12/01  Soft palate laceration, ENT scoped 12/8, stable, no acute intervention at this time  Acute neuro change - Appreciate Neurology input  Follow up final EEG report.    ***Cardiovascular***  At high risk for hemodynamic instability and cardiac arrhythmias.  Lactate 1.3, continue trending   Continue low dose Milrinone for now.  L Fem IABP 1:1 with good augmentation   AC Therapy with Heparin gtt pAF/flutter PTT 44  Amiodarone for rate control    ***Pulmonary***  Acute respiratory failure  Wean ventilator as tolerated.    ***GI***  Protein calorie malnutrition - Tube feeds via nasal feeding tube.  Pancreatitis   Protonix for stress ulcer prophylaxis   Bowel regimen with Miralax and Senna    ***Renal***  ERIC, renal following, continue CRRT  Gentle volume removal.  De Dios catheter for strict I/O measurements.  Replete electrolytes as indicated.    ***ID***  Febrile, cultured  Vancomycin, Merrem, and Diflucan for now.  Follow up cultures.  Off Cefepime now in setting of neuro changes.    ***Endocrine***  Hx of DM2, continue glycemic control w/ ISS.        Patient requires continuous monitoring with bedside rhythm monitoring, pulse oximetry monitoring, and continuous central venous and arterial pressure monitoring; and intermittent blood gas analysis. Care plan discussed with the ICU care team.   Patient remained critical, at risk for life threatening decompensation.    I have spent 48 minutes providing critical care management to this patient.    By signing my name below, I, Rosio Cheri, attest that this documentation has been prepared under the direction and in the presence of Jade Rosas MD  Electronically signed: Rosio Alonzo Brian, 12-12-22 @ 08:39    I, Jade Rosas MD, personally performed the services described in this documentation. all medical record entries made by the scribe were at my direction and in my presence. I have reviewed the chart and agree that the record reflects my personal performance and is accurate and complete  Electronically signed: Jade Rosas MD Patient seen and examined at the bedside.    Remained critically ill on continuous ICU monitoring.    Brief Summary:  Cardiogenic shock s/p VA ECMO decannulated 12/8/22.     24 Hour events:  - Acute change in neuro status, minimally responsive.  - Stroke code activated, CT head unremarkable, CTA with L MCA watershed infarct, but not consistent with exam.  - EEG ongoing.  - No pressor requirements as of now   - Plan to up titrate AC therapy     OBJECTIVE:  Vital Signs Last 24 Hrs  T(C): 36.9 (12 Dec 2022 08:00), Max: 38.2 (12 Dec 2022 01:45)  T(F): 98.4 (12 Dec 2022 08:00), Max: 100.8 (12 Dec 2022 01:45)  HR: 109 (12 Dec 2022 08:15) (75 - 170)  BP: --  BP(mean): --  RR: 23 (12 Dec 2022 08:15) (8 - 32)  SpO2: 100% (12 Dec 2022 08:15) (96% - 100%)    Parameters below as of 12 Dec 2022 08:00  Patient On (Oxygen Delivery Method): ventilator    O2 Concentration (%): 40    Mode: AC/ CMV (Assist Control/ Continuous Mandatory Ventilation)  RR (machine): 14  FiO2: 40  PEEP: 5  ITime: 1.2  MAP: 15  PC: 18  PIP: 26            Physical Exam:   General: Intubated, multiple lines gtt  Neurology: on/off sedation - Seems to track with eyes at times, not following commands, not withdrawing to noxious stimuli  Eyes: bilateral pupils equal and reactive   ENT/Neck: +ETT midline, Neck supple, trachea midline, No JVD   Respiratory: rhonchi bilaterally   CV: S1S2, no murmurs        [x] Afib  Abdominal: Softly distended, +BS   Extremities: 1+ pedal edema noted, + peripheral pulses palpable, warm, L Fem IABP  Skin: No Rashes, Hematoma, Ecchymosis   -------------------------------------------------------------------------------------------------------------------------------                        9.2    18.07 )-----------( 142      ( 12 Dec 2022 00:41 )             28.2     12-12    134<L>  |  99  |  25<H>  ----------------------------<  100<H>  3.8   |  20<L>  |  1.55<H>    Ca    8.3<L>      12 Dec 2022 00:41  Phos  3.4     12-12  Mg     2.1     12-12    TPro  6.2  /  Alb  2.8<L>  /  TBili  1.2  /  DBili  x   /  AST  44<H>  /  ALT  18  /  AlkPhos  134<H>  12-12    LIVER FUNCTIONS - ( 12 Dec 2022 00:41 )  Alb: 2.8 g/dL / Pro: 6.2 g/dL / ALK PHOS: 134 U/L / ALT: 18 U/L / AST: 44 U/L / GGT: x           PT/INR - ( 12 Dec 2022 00:41 )   PT: 13.7 sec;   INR: 1.18 ratio         PTT - ( 12 Dec 2022 00:41 )  PTT:30.7 sec  ABG - ( 12 Dec 2022 00:22 )  pH, Arterial: 7.48  pH, Blood: x     /  pCO2: 29    /  pO2: 125   / HCO3: 22    / Base Excess: -1.3  /  SaO2: 98.6    ------------------------------------------------------------------------------------------------------------------------------  Assessment:  61 y/o male with PMH of CABG, DM, HTN, HLD presenting as transfer from [place] for c/f STEMI. PTA arrival received 325 aspirin, 600 plavix, and no heparin drip started. Around 1:30 am patient had multiple episodes of non-bloody diarrhea and emesis with associated abdominal pain and chest pain, unable to localize, non-radiating, with associated SOB and no associated palpitations or diaphoresis. No recent fevers/chills, cough, rashes, or changes in urination. Does report mild diffuse back pain; started 5 days ago in setting on injury while walking and lifting objects. Denies focal weakness, numbness/tingling, or LOC due does report mild dizziness.    Acute respiratory distress/failure, intubated    Cardiogenic shock s/p VA ECMO decannulated 12/8  CAD/ASHD/CABG, acute MI  Cardiogenic shock  Hemodynamic instability  Acute kidney injury  encounter management IABP  encounter weaning ventilator  Leukocytosis   Thrombocytopenia  Anemia  Acute pancreatitis      Plan:   ***Neuro***  CT head showed 4mm subdural hematoma 11/26: repeat CT head unchanged 12/01  Soft palate laceration, ENT scoped 12/8, stable, no acute intervention at this time  Acute neuro change - Appreciate Neurology input  Follow up final EEG report.    ***Cardiovascular***  At high risk for hemodynamic instability and cardiac arrhythmias.  Lactate 1.3, continue trending   Continue low dose Milrinone for now.  L Fem IABP 1:1 with good augmentation   AC Therapy with Heparin gtt pAF/flutter PTT 44  Amiodarone for rate control    ***Pulmonary***  Acute respiratory failure  Wean ventilator as tolerated.    ***GI***  Protein calorie malnutrition - Tube feeds via nasal feeding tube.  Pancreatitis   Protonix for stress ulcer prophylaxis   Bowel regimen with Miralax and Senna    ***Renal***  ERIC, renal following, continue CRRT  Gentle volume removal.  De Dios catheter for strict I/O measurements.  Replete electrolytes as indicated.    ***ID***  Febrile, cultured  Vancomycin, Merrem, and Diflucan for now.  Follow up cultures.  Off Cefepime now in setting of neuro changes.    ***Endocrine***  Hx of DM2, continue glycemic control w/ ISS.        Patient requires continuous monitoring with bedside rhythm monitoring, pulse oximetry monitoring, and continuous central venous and arterial pressure monitoring; and intermittent blood gas analysis. Care plan discussed with the ICU care team.   Patient remained critical, at risk for life threatening decompensation.    I have spent 48 minutes providing critical care management to this patient.    By signing my name below, I, Rosio Cheri, attest that this documentation has been prepared under the direction and in the presence of Jade Rosas MD  Electronically signed: Rosio Alonzo Brian, 12-12-22 @ 08:39    I, Jade Rosas MD, personally performed the services described in this documentation. all medical record entries made by the scribe were at my direction and in my presence. I have reviewed the chart and agree that the record reflects my personal performance and is accurate and complete  Electronically signed: Jade Rosas MD

## 2022-12-12 NOTE — PHYSICAL THERAPY INITIAL EVALUATION ADULT - PERTINENT HX OF CURRENT PROBLEM, REHAB EVAL
Pt is a 61 y/o M adm on 11/24 with PMH of CABG, DM, HTN, HLD presenting as transfer from [place] for c/f STEMI. PTA arrival received 325 aspirin, 600 plavix, and no heparin drip started. Around 1:30 am patient had multiple episodes of non-bloody diarrhea and emesis with assoc abd pain and CP, unable to localize, non-rad with assoc SOB and no palpitations or diaphoresis. No recent fevers/chills, cough, rashes, or changes in urination. Does report mild diffuse back pain; started 5 days ago in setting on injury while walking and lifting objects. Denies focal weakness, numbness/tingling, or LOC due does report mild dizziness. HC: 11/24 trop inc from 205 to 455, 11/25 s/p ECMO, s/p trach, 11/26+gallstone pancreatitis-ERCP deferred, 11/25 Neurosurg +tentorial SDH, 12/6 GOC discussion-not for comfort yet; 12/8 s/p ECMO decannulation; 12/12 Neuro:Acute change in mentation and functional status with R gaze preference, generalized shivering, not following commands, concerning for possible acute ischemic event vs encephalopathy vs seizure in the setting of critically ill patient with multiple comorbidities. referred for PT for mgmt of decannulated ECMO site with NPWT 12/12 Pt is a 63 y/o M adm on 11/24 with PMH of CABG, DM, HTN, HLD presenting as transfer from [place] for c/f STEMI. PTA arrival received 325 aspirin, 600 plavix, and no heparin drip started. Around 1:30 am patient had multiple episodes of non-bloody diarrhea and emesis with assoc abd pain and CP, unable to localize, non-rad with assoc SOB and no palpitations or diaphoresis. No recent fevers/chills, cough, rashes, or changes in urination. Does report mild diffuse back pain; started 5 days ago in setting on injury while walking and lifting objects. Denies focal weakness, numbness/tingling, or LOC due does report mild dizziness. HC: 11/24 trop inc from 205 to 455, 11/25 s/p ECMO, s/p trach, 11/26+gallstone pancreatitis-ERCP deferred, 11/25 Neurosurg +tentorial SDH, 12/6 GOC discussion-not for comfort yet; 12/8 s/p ECMO decannulation; 12/12 Neuro:Acute change in mentation and functional status with R gaze preference, generalized shivering, not following commands, concerning for possible acute ischemic event vs encephalopathy vs seizure in the setting of critically ill patient with multiple comorbidities. referred for PT for mgmt of decannulated ECMO site with NPWT 12/12 Pt is a 61 y/o M adm on 11/24 with PMH of CABG, DM, HTN, HLD presenting as transfer from [place] for c/f STEMI. PTA arrival received 325 aspirin, 600 plavix, and no heparin drip started. Around 1:30 am patient had multiple episodes of non-bloody diarrhea and emesis with assoc abd pain and CP, unable to localize, non-rad with assoc SOB and no palpitations or diaphoresis. No recent fevers/chills, cough, rashes, or changes in urination. Does report mild diffuse back pain; started 5 days ago in setting on injury while walking and lifting objects. Denies focal weakness, numbness/tingling, or LOC due does report mild dizziness. HC: 11/24 trop inc from 205 to 455, 11/25 s/p ECMO, s/p trach, 11/26+gallstone pancreatitis-ERCP deferred, 11/25 Neurosurg +tentorial SDH, 12/6 GOC discussion-not for comfort yet; 12/8 s/p ECMO decannulation; 12/12 Neuro:Acute change in mentation and functional status with R gaze preference, generalized shivering, not following commands, concerning for possible acute ischemic event vs encephalopathy vs seizure in the setting of critically ill patient with multiple comorbidities. referred for PT for mgmt of decannulated ECMO site with NPWT 12/12

## 2022-12-12 NOTE — PROGRESS NOTE ADULT - PROBLEM SELECTOR PLAN 4
- ECMO decannulated 12/08 after successful wean  - IABP in place, keep 1:1, target augmented MAP 70s though will defer afterload agent titration while there is c/f developing sepsis  - cont hep gtt for AC while on IABP  - send infectious workup and start broad spec abx given lack of augmentation of MAP w/ IABP and concern for vasoplegia/febrile  - lower milrinone  - added vaso given concern for septic component.

## 2022-12-12 NOTE — PHYSICAL THERAPY INITIAL EVALUATION ADULT - MODALITIES TREATMENT COMMENTS
R groin incision x 4.0cm in length, skin flap approximated with staples, min serosanguinsous drainage

## 2022-12-12 NOTE — PROGRESS NOTE ADULT - PROBLEM SELECTOR PLAN 8
- f/u ID recs, multiple potential sources given recent lines/procedures, as well as possible biliary source  - consider removing non-vital lines  - f/u pan cultures and start abx.

## 2022-12-12 NOTE — CHART NOTE - NSCHARTNOTEFT_GEN_A_CORE
Nutrition Follow Up Note  Patient seen for: Malnutrition initial follow-up. Chart reviewed, events noted.     Per chart, pt is 62M with PMH of CAD s/p 4v CABG ~2019 in [facility], DM2 (A1c 7.3%), and HTN who initially presented to OSH with abdominal pain and n/v and found to have pancreatitis with lipase > 3000 though also with elevated troponins. CT abdomen revealed acute pancreatitis and his initial LVEF was 40-45%. He developed MSOF with hypoxic respiratory failure requiring intubation and ERIC and went into hypoxic PEA arrest requiring ACLS and due to persistent hypoxia and hypotension on pressors after ROSC was cannulated to [facility] (LFV, RFA, no anterograde perfusion catheter) on . Notably CTH that day revealed small subdural hemorrhage. S/p ECMO decannulation to IABP on .     Source: [] Patient       [x] EMR        [] RN        [] Family at bedside       [] Other:    -If unable to interview patient: [x] Trach/Vent/BiPAP  [] Disoriented/confused/inappropriate to interview    Diet Order: Diet, NPO with Tube Feed:   Tube Feeding Modality: Nasogastric  Glucerna 1.2 Johnathon (GLUCERNARTH)  Total Volume for 24 Hours (mL): 1440  Continuous  Starting Tube Feed Rate {mL per Hour}: 60  Until Goal Tube Feed Rate (mL per Hour): 60  Tube Feed Duration (in Hours): 24  Tube Feed Start Time: 14:25  No Carb Prosource TF     Qty per Day:  1 (22 @ 19:02)    EN Order Provides: 1440mL total volume, 1768kcal (40kcal from Prosource), 97g protein (11g from Prosource), 1159mL free water.  Current Pump Rate: 20mL/hr  EN Provision per flow sheets:  ***Note feeds changed from Vital AF to Glucerna 1.2 on   - : 760mL  - 12/10: 580mL  - : 1020mL of Glucerna 1.2; 325mL of Vital AF  - : 910mL  - : 905mL    Nutrition-related concerns:   - Pt remains intubated, off sedation. Neurological changes noted.    - Vaso currently off. Pt on Milrinone gtt.    - Pt remains on CRRT. Electrolytes WNL.    - Sliding scale insulin for BG management.     GI: Last BM 12/10.  Bowel Regimen? [x] Yes   [] No   - Antibiotic/antifungal regimens noted     Weights:   Daily Weight in k.6 (12-12), Weight in k.6 (-11), Weight in k (-10), Weight in k.3 (-), Weight in k.6 (), Weight in k (), Weight in k.4 ()  Dosing Weight (kg): 92.9 ()   - weight fluctuations noted, likely 2/2 fluid shifts with CRRT - will continue to monitor    MEDICATIONS  (STANDING):  aMIOdarone    Tablet   Oral   aMIOdarone    Tablet 400 milliGRAM(s) Oral every 8 hours  aMIOdarone Infusion 0.5 mG/Min (16.7 mL/Hr) IV Continuous <Continuous>  atorvastatin 40 milliGRAM(s) Oral at bedtime  chlorhexidine 0.12% Liquid 15 milliLiter(s) Oral Mucosa every 12 hours  chlorhexidine 2% Cloths 1 Application(s) Topical <User Schedule>  CRRT Treatment    <Continuous>  fluconAZOLE IVPB      fluconAZOLE IVPB 200 milliGRAM(s) IV Intermittent every 24 hours  heparin  Infusion 800 Unit(s)/Hr (12 mL/Hr) IV Continuous <Continuous>  insulin lispro (ADMELOG) corrective regimen sliding scale   SubCutaneous every 4 hours  meropenem  IVPB 1000 milliGRAM(s) IV Intermittent every 12 hours  milrinone Infusion 0.1 MICROgram(s)/kG/Min (2.79 mL/Hr) IV Continuous <Continuous>  pantoprazole  Injectable 40 milliGRAM(s) IV Push daily  Phoxillum Filtration BK 4 / 2.5 5000 milliLiter(s) (1000 mL/Hr) CRRT <Continuous>  polyethylene glycol 3350 17 Gram(s) Oral two times a day  PrismaSATE Dialysate BK 0 / 3.5 5000 milliLiter(s) (2000 mL/Hr) CRRT <Continuous>  PrismaSOL Filtration BGK 4 / 2.5 5000 milliLiter(s) (200 mL/Hr) CRRT <Continuous>  senna 2 Tablet(s) Oral at bedtime  sodium chloride 0.9%. 1000 milliLiter(s) (5 mL/Hr) IV Continuous <Continuous>  vancomycin  IVPB 1000 milliGRAM(s) IV Intermittent every 24 hours  vasopressin Infusion 0.033 Unit(s)/Min (5 mL/Hr) IV Continuous <Continuous>    Pertinent Labs: 12-12 @ 00:41: Na 134<L>, BUN 25<H>, Cr 1.55<H>, <H>, K+ 3.8, Phos 3.4, Mg 2.1, Alk Phos 134<H>, ALT/SGPT 18, AST/SGOT 44<H>, HbA1c --    Skin per nursing documentation: No pressure injuries noted.   Edema: 3+ generalized, 4+ left wrist; right wrist; left hand; right hand    Estimated Energy Needs: 1744 -2180kcal (20-25kcal/kg)  Estimated Protein Needs: 112-139g protein (1.6-2.0g/kg)  Defer fluids to team.   [place] State Equation: 1833kcal ()  Needs based on lowest daily wt 87.2kg () for energy & IBW 69.8kg for protein.     Previous Nutrition Diagnosis: Severe acute malnutrition & Increased nutrient needs  Nutrition Diagnosis is: [x] ongoing  [] resolved [] not applicable     New Nutrition Diagnosis: [x] Not applicable    Nutrition Care Plan:  [x] In Progress: see recommendations.   [] Achieved  [] Not applicable    Nutrition Interventions:     Education Provided:       [] Yes:  [x] No: Not appropriate at this time    Recommendations:        ****IN PROGRESS/INCOMPLETE****     RD remains available upon request and will follow up per protocol    Mala Han MS, RD, CDN, [facility] #[phone] Nutrition Follow Up Note  Patient seen for: Malnutrition initial follow-up. Chart reviewed, events noted.     Per chart, pt is 62M with PMH of CAD s/p 4v CABG ~2019 in [facility], DM2 (A1c 7.3%), and HTN who initially presented to OSH with abdominal pain and n/v and found to have pancreatitis with lipase > 3000 though also with elevated troponins. CT abdomen revealed acute pancreatitis and his initial LVEF was 40-45%. He developed MSOF with hypoxic respiratory failure requiring intubation and ERIC and went into hypoxic PEA arrest requiring ACLS and due to persistent hypoxia and hypotension on pressors after ROSC was cannulated to [facility] (LFV, RFA, no anterograde perfusion catheter) on . Notably CTH that day revealed small subdural hemorrhage. S/p ECMO decannulation to IABP on .     Source: [] Patient       [x] EMR        [] RN        [] Family at bedside       [] Other:    -If unable to interview patient: [x] Trach/Vent/BiPAP  [] Disoriented/confused/inappropriate to interview    Diet Order: Diet, NPO with Tube Feed:   Tube Feeding Modality: Nasogastric  Glucerna 1.2 Johnathon (GLUCERNARTH)  Total Volume for 24 Hours (mL): 1440  Continuous  Starting Tube Feed Rate {mL per Hour}: 60  Until Goal Tube Feed Rate (mL per Hour): 60  Tube Feed Duration (in Hours): 24  Tube Feed Start Time: 14:25  No Carb Prosource TF     Qty per Day:  1 (22 @ 19:02)    EN Order Provides: 1440mL total volume, 1768kcal (40kcal from Prosource), 97g protein (11g from Prosource), 1159mL free water.  Current Pump Rate: 20mL/hr  EN Provision per flow sheets:  ***Note feeds changed from Vital AF to Glucerna 1.2 on   - : 760mL  - 12/10: 580mL  - : 1020mL of Glucerna 1.2; 325mL of Vital AF  - : 910mL  - : 905mL    Nutrition-related concerns:   - Pt remains intubated, off sedation. Neurological changes noted.    - Vaso currently off. Pt on Milrinone gtt.    - Pt remains on CRRT. Electrolytes WNL.    - Sliding scale insulin for BG management.     GI: Last BM 12/10.  Bowel Regimen? [x] Yes   [] No   - Antibiotic/antifungal regimens noted     Weights:   Daily Weight in k.6 (12-12), Weight in k.6 (-11), Weight in k (-10), Weight in k.3 (-), Weight in k.6 (), Weight in k (), Weight in k.4 ()  Dosing Weight (kg): 92.9 ()   - weight fluctuations noted, likely 2/2 fluid shifts with CRRT - will continue to monitor    MEDICATIONS  (STANDING):  aMIOdarone    Tablet   Oral   aMIOdarone    Tablet 400 milliGRAM(s) Oral every 8 hours  aMIOdarone Infusion 0.5 mG/Min (16.7 mL/Hr) IV Continuous <Continuous>  atorvastatin 40 milliGRAM(s) Oral at bedtime  chlorhexidine 0.12% Liquid 15 milliLiter(s) Oral Mucosa every 12 hours  chlorhexidine 2% Cloths 1 Application(s) Topical <User Schedule>  CRRT Treatment    <Continuous>  fluconAZOLE IVPB      fluconAZOLE IVPB 200 milliGRAM(s) IV Intermittent every 24 hours  heparin  Infusion 800 Unit(s)/Hr (12 mL/Hr) IV Continuous <Continuous>  insulin lispro (ADMELOG) corrective regimen sliding scale   SubCutaneous every 4 hours  meropenem  IVPB 1000 milliGRAM(s) IV Intermittent every 12 hours  milrinone Infusion 0.1 MICROgram(s)/kG/Min (2.79 mL/Hr) IV Continuous <Continuous>  pantoprazole  Injectable 40 milliGRAM(s) IV Push daily  Phoxillum Filtration BK 4 / 2.5 5000 milliLiter(s) (1000 mL/Hr) CRRT <Continuous>  polyethylene glycol 3350 17 Gram(s) Oral two times a day  PrismaSATE Dialysate BK 0 / 3.5 5000 milliLiter(s) (2000 mL/Hr) CRRT <Continuous>  PrismaSOL Filtration BGK 4 / 2.5 5000 milliLiter(s) (200 mL/Hr) CRRT <Continuous>  senna 2 Tablet(s) Oral at bedtime  sodium chloride 0.9%. 1000 milliLiter(s) (5 mL/Hr) IV Continuous <Continuous>  vancomycin  IVPB 1000 milliGRAM(s) IV Intermittent every 24 hours  vasopressin Infusion 0.033 Unit(s)/Min (5 mL/Hr) IV Continuous <Continuous>    Pertinent Labs: 12-12 @ 00:41: Na 134<L>, BUN 25<H>, Cr 1.55<H>, <H>, K+ 3.8, Phos 3.4, Mg 2.1, Alk Phos 134<H>, ALT/SGPT 18, AST/SGOT 44<H>, HbA1c --    Skin per nursing documentation: No pressure injuries noted.   Edema: 3+ generalized, 4+ left wrist; right wrist; left hand; right hand    Estimated Energy Needs: 1744 -2180kcal (20-25kcal/kg)  Estimated Protein Needs: 112-139g protein (1.6-2.0g/kg)  Defer fluids to team.   [place] State Equation: 1833kcal ()  Needs based on lowest daily wt 87.2kg () for energy & IBW 69.8kg for protein.     Previous Nutrition Diagnosis: Severe acute malnutrition & Increased nutrient needs  Nutrition Diagnosis is: [x] ongoing  [] resolved [] not applicable     New Nutrition Diagnosis: [x] Not applicable    Nutrition Care Plan:  [x] In Progress: see recommendations.   [] Achieved  [] Not applicable    Nutrition Interventions:     Education Provided:       [] Yes:  [x] No: Not appropriate at this time    Recommendations:        ****IN PROGRESS/INCOMPLETE****     RD remains available upon request and will follow up per protocol    Mala Han MS, RD, CDN, [facility] #[phone] Nutrition Follow Up Note  Patient seen for: Malnutrition initial follow-up. Chart reviewed, events noted.     Per chart, pt is 62M with PMH of CAD s/p 4v CABG ~2019 in [facility], DM2 (A1c 7.3%), and HTN who initially presented to OSH with abdominal pain and n/v and found to have pancreatitis with lipase > 3000 though also with elevated troponins. CT abdomen revealed acute pancreatitis and his initial LVEF was 40-45%. He developed MSOF with hypoxic respiratory failure requiring intubation and ERIC and went into hypoxic PEA arrest requiring ACLS and due to persistent hypoxia and hypotension on pressors after ROSC was cannulated to [facility] (LFV, RFA, no anterograde perfusion catheter) on . Notably CTH that day revealed small subdural hemorrhage. S/p ECMO decannulation to IABP on .     Source: [] Patient       [x] EMR        [] RN        [] Family at bedside       [] Other:    -If unable to interview patient: [x] Trach/Vent/BiPAP  [] Disoriented/confused/inappropriate to interview    Diet Order: Diet, NPO with Tube Feed:   Tube Feeding Modality: Nasogastric  Glucerna 1.2 Johnathon (GLUCERNARTH)  Total Volume for 24 Hours (mL): 1440  Continuous  Starting Tube Feed Rate {mL per Hour}: 60  Until Goal Tube Feed Rate (mL per Hour): 60  Tube Feed Duration (in Hours): 24  Tube Feed Start Time: 14:25  No Carb Prosource TF     Qty per Day:  1 (22 @ 19:02)    EN Order Provides: 1440mL total volume, 1768kcal (40kcal from Prosource), 97g protein (11g from Prosource), 1159mL free water.  Current Pump Rate: 20mL/hr  EN Provision per flow sheets:  ***Note feeds changed from Vital AF to Glucerna 1.2 on   - : 760mL  - 12/10: 580mL  - : 1020mL of Glucerna 1.2; 325mL of Vital AF  - : 910mL  - : 905mL    Nutrition-related concerns:   - Pt remains intubated, off sedation. Neurological changes noted.    - Vaso currently off. Pt on Milrinone gtt.    - Pt remains on CRRT. Electrolytes WNL.    - Sliding scale insulin for BG management.     GI: Last BM 12/10.  Bowel Regimen? [x] Yes   [] No   - Antibiotic/antifungal regimens noted     Weights:   Daily Weight in k.6 (12-12), Weight in k.6 (-11), Weight in k (-10), Weight in k.3 (-), Weight in k.6 (), Weight in k (), Weight in k.4 ()  Dosing Weight (kg): 92.9 ()   - weight fluctuations noted, likely 2/2 fluid shifts with CRRT - will continue to monitor    MEDICATIONS  (STANDING):  aMIOdarone    Tablet   Oral   aMIOdarone    Tablet 400 milliGRAM(s) Oral every 8 hours  aMIOdarone Infusion 0.5 mG/Min (16.7 mL/Hr) IV Continuous <Continuous>  atorvastatin 40 milliGRAM(s) Oral at bedtime  chlorhexidine 0.12% Liquid 15 milliLiter(s) Oral Mucosa every 12 hours  chlorhexidine 2% Cloths 1 Application(s) Topical <User Schedule>  CRRT Treatment    <Continuous>  fluconAZOLE IVPB      fluconAZOLE IVPB 200 milliGRAM(s) IV Intermittent every 24 hours  heparin  Infusion 800 Unit(s)/Hr (12 mL/Hr) IV Continuous <Continuous>  insulin lispro (ADMELOG) corrective regimen sliding scale   SubCutaneous every 4 hours  meropenem  IVPB 1000 milliGRAM(s) IV Intermittent every 12 hours  milrinone Infusion 0.1 MICROgram(s)/kG/Min (2.79 mL/Hr) IV Continuous <Continuous>  pantoprazole  Injectable 40 milliGRAM(s) IV Push daily  Phoxillum Filtration BK 4 / 2.5 5000 milliLiter(s) (1000 mL/Hr) CRRT <Continuous>  polyethylene glycol 3350 17 Gram(s) Oral two times a day  PrismaSATE Dialysate BK 0 / 3.5 5000 milliLiter(s) (2000 mL/Hr) CRRT <Continuous>  PrismaSOL Filtration BGK 4 / 2.5 5000 milliLiter(s) (200 mL/Hr) CRRT <Continuous>  senna 2 Tablet(s) Oral at bedtime  sodium chloride 0.9%. 1000 milliLiter(s) (5 mL/Hr) IV Continuous <Continuous>  vancomycin  IVPB 1000 milliGRAM(s) IV Intermittent every 24 hours  vasopressin Infusion 0.033 Unit(s)/Min (5 mL/Hr) IV Continuous <Continuous>    Pertinent Labs: 12-12 @ 00:41: Na 134<L>, BUN 25<H>, Cr 1.55<H>, <H>, K+ 3.8, Phos 3.4, Mg 2.1, Alk Phos 134<H>, ALT/SGPT 18, AST/SGOT 44<H>, HbA1c --    Skin per nursing documentation: No pressure injuries noted.   Edema: 3+ generalized, 4+ left wrist; right wrist; left hand; right hand    Estimated Energy Needs: 1744 -2180kcal (20-25kcal/kg)  Estimated Protein Needs: 112-139g protein (1.6-2.0g/kg)  Defer fluids to team.   [place] State Equation: 1833kcal ()  Needs based on lowest daily wt 87.2kg () for energy & IBW 69.8kg for protein.     Previous Nutrition Diagnosis: Severe acute malnutrition & Increased nutrient needs  Nutrition Diagnosis is: [x] ongoing  [] resolved [] not applicable     New Nutrition Diagnosis: [x] Not applicable    Nutrition Care Plan:  [x] In Progress: see recommendations.   [] Achieved  [] Not applicable    Nutrition Interventions:     Education Provided:       [] Yes:  [x] No: Not appropriate at this time    Recommendations:        ****IN PROGRESS/INCOMPLETE****     RD remains available upon request and will follow up per protocol    Mala Han MS, RD, CDN, [facility] #[phone] Nutrition Follow Up Note  Patient seen for: Malnutrition initial follow-up. Chart reviewed, events noted.     Per chart, pt is 62M with PMH of CAD s/p 4v CABG ~2019 in [facility], DM2 (A1c 7.3%), and HTN who initially presented to OSH with abdominal pain and n/v and found to have pancreatitis with lipase > 3000 though also with elevated troponins. CT abdomen revealed acute pancreatitis and his initial LVEF was 40-45%. He developed MSOF with hypoxic respiratory failure requiring intubation and ERIC and went into hypoxic PEA arrest requiring ACLS and due to persistent hypoxia and hypotension on pressors after ROSC was cannulated to [facility] ECMO (LFV, RFA, no anterograde perfusion catheter) on . Notably CTH that day revealed small subdural hemorrhage. S/p ECMO decannulation to IABP on .     Source: [] Patient       [x] EMR        [] RN        [] Family at bedside       [] Other:    -If unable to interview patient: [x] Trach/Vent/BiPAP  [] Disoriented/confused/inappropriate to interview    Diet Order: Diet, NPO with Tube Feed:   Tube Feeding Modality: Nasogastric  Glucerna 1.2 Johnathon (GLUCERNARTH)  Total Volume for 24 Hours (mL): 1440  Continuous  Starting Tube Feed Rate {mL per Hour}: 60  Until Goal Tube Feed Rate (mL per Hour): 60  Tube Feed Duration (in Hours): 24  Tube Feed Start Time: 14:25  No Carb Prosource TF     Qty per Day:  1 (22 @ 19:02)    EN Order Provides: 1440mL total volume, 1768kcal (40kcal from Prosource), 97g protein (11g from Prosource), 1159mL free water.  Current Pump Rate: 20mL/hr  EN Provision per flow sheets:  ***Note feeds changed from Vital AF to Glucerna 1.2 on   - : 760mL  - 12/10: 580mL  - : 1020mL of Glucerna 1.2; 325mL of Vital AF  - : 910mL  - : 905mL    Nutrition-related concerns:   - Pt remains intubated, off sedation. Neurological changes noted.    - Vaso currently off. Pt on Milrinone gtt.    - Pt remains on CRRT. Electrolytes WNL. Hyponatremia noted.    - Sliding scale insulin for BG management.     GI: Last BM 12/10.  Bowel Regimen? [x] Yes   [] No   - Antibiotic/antifungal regimens noted     Weights:   Daily Weight in k.6 (12-12), Weight in k.6 (12-11), Weight in k (12-10), Weight in k.3 (-), Weight in k.6 (), Weight in k (-), Weight in k.4 ()  Dosing Weight (kg): 92.9 ()   - weight fluctuations noted, likely 2/2 fluid shifts with CRRT - will continue to monitor    MEDICATIONS  (STANDING):  aMIOdarone    Tablet   Oral   aMIOdarone    Tablet 400 milliGRAM(s) Oral every 8 hours  aMIOdarone Infusion 0.5 mG/Min (16.7 mL/Hr) IV Continuous <Continuous>  atorvastatin 40 milliGRAM(s) Oral at bedtime  chlorhexidine 0.12% Liquid 15 milliLiter(s) Oral Mucosa every 12 hours  chlorhexidine 2% Cloths 1 Application(s) Topical <User Schedule>  CRRT Treatment    <Continuous>  fluconAZOLE IVPB      fluconAZOLE IVPB 200 milliGRAM(s) IV Intermittent every 24 hours  heparin  Infusion 800 Unit(s)/Hr (12 mL/Hr) IV Continuous <Continuous>  insulin lispro (ADMELOG) corrective regimen sliding scale   SubCutaneous every 4 hours  meropenem  IVPB 1000 milliGRAM(s) IV Intermittent every 12 hours  milrinone Infusion 0.1 MICROgram(s)/kG/Min (2.79 mL/Hr) IV Continuous <Continuous>  pantoprazole  Injectable 40 milliGRAM(s) IV Push daily  Phoxillum Filtration BK 4 / 2.5 5000 milliLiter(s) (1000 mL/Hr) CRRT <Continuous>  polyethylene glycol 3350 17 Gram(s) Oral two times a day  PrismaSATE Dialysate BK 0 / 3.5 5000 milliLiter(s) (2000 mL/Hr) CRRT <Continuous>  PrismaSOL Filtration BGK 4 / 2.5 5000 milliLiter(s) (200 mL/Hr) CRRT <Continuous>  senna 2 Tablet(s) Oral at bedtime  sodium chloride 0.9%. 1000 milliLiter(s) (5 mL/Hr) IV Continuous <Continuous>  vancomycin  IVPB 1000 milliGRAM(s) IV Intermittent every 24 hours  vasopressin Infusion 0.033 Unit(s)/Min (5 mL/Hr) IV Continuous <Continuous>    Pertinent Labs: 12-12 @ 00:41: Na 134<L>, BUN 25<H>, Cr 1.55<H>, <H>, K+ 3.8, Phos 3.4, Mg 2.1, Alk Phos 134<H>, ALT/SGPT 18, AST/SGOT 44<H>, HbA1c --    Skin per nursing documentation: No pressure injuries noted.   Edema: 3+ generalized, 4+ left wrist; right wrist; left hand; right hand    Estimated Energy Needs: 1744 -2180kcal (20-25kcal/kg)  Estimated Protein Needs: 112-139g protein (1.6-2.0g/kg)  Defer fluids to team.   [place] State Equation: 1833kcal ()  Needs based on lowest daily wt 87.2kg () for energy & IBW 69.8kg for protein.     Previous Nutrition Diagnosis: Severe acute malnutrition & Increased nutrient needs  Nutrition Diagnosis is: [x] ongoing  [] resolved [] not applicable     New Nutrition Diagnosis: [x] Not applicable    Nutrition Care Plan:  [x] In Progress: see recommendations.   [] Achieved  [] Not applicable    Nutrition Interventions:     Education Provided:       [] Yes:  [x] No: Not appropriate at this time    Recommendations:      1. To better meet estimated nutrient needs, recommend change in EN regimen to Vital AF with goal rate of 65mL/hr x 24hr to provide 1560mL total volume, 1872kcal (21kcal/kg daily wt 87.2kg), 117g protein (1.7g/kg IBW 69.8kg), & 1265mL free water.    -- Free water flushes per team.   -- Discontinue Prosource supplement.  2. Add multivitamin and thiamine if not otherwise contraindicated. Monitor/replete electrolytes PRN.   3. Monitor GI tolerance to feeds, RD remains available to adjust TF regimen/formulary as needed/upon request.     RD remains available upon request and will follow up per protocol    Mala Han MS, RD, CDN, [facility] #[phone] Nutrition Follow Up Note  Patient seen for: Malnutrition initial follow-up. Chart reviewed, events noted.     Per chart, pt is 62M with PMH of CAD s/p 4v CABG ~2019 in [facility], DM2 (A1c 7.3%), and HTN who initially presented to OSH with abdominal pain and n/v and found to have pancreatitis with lipase > 3000 though also with elevated troponins. CT abdomen revealed acute pancreatitis and his initial LVEF was 40-45%. He developed MSOF with hypoxic respiratory failure requiring intubation and ERIC and went into hypoxic PEA arrest requiring ACLS and due to persistent hypoxia and hypotension on pressors after ROSC was cannulated to [facility] ECMO (LFV, RFA, no anterograde perfusion catheter) on . Notably CTH that day revealed small subdural hemorrhage. S/p ECMO decannulation to IABP on .     Source: [] Patient       [x] EMR        [] RN        [] Family at bedside       [] Other:    -If unable to interview patient: [x] Trach/Vent/BiPAP  [] Disoriented/confused/inappropriate to interview    Diet Order: Diet, NPO with Tube Feed:   Tube Feeding Modality: Nasogastric  Glucerna 1.2 Johnathon (GLUCERNARTH)  Total Volume for 24 Hours (mL): 1440  Continuous  Starting Tube Feed Rate {mL per Hour}: 60  Until Goal Tube Feed Rate (mL per Hour): 60  Tube Feed Duration (in Hours): 24  Tube Feed Start Time: 14:25  No Carb Prosource TF     Qty per Day:  1 (22 @ 19:02)    EN Order Provides: 1440mL total volume, 1768kcal (40kcal from Prosource), 97g protein (11g from Prosource), 1159mL free water.  Current Pump Rate: 20mL/hr  EN Provision per flow sheets:  ***Note feeds changed from Vital AF to Glucerna 1.2 on   - : 760mL  - 12/10: 580mL  - : 1020mL of Glucerna 1.2; 325mL of Vital AF  - : 910mL  - : 905mL    Nutrition-related concerns:   - Pt remains intubated, off sedation. Neurological changes noted.    - Vaso currently off. Pt on Milrinone gtt.    - Pt remains on CRRT. Electrolytes WNL. Hyponatremia noted.    - Sliding scale insulin for BG management.     GI: Last BM 12/10.  Bowel Regimen? [x] Yes   [] No   - Antibiotic/antifungal regimens noted     Weights:   Daily Weight in k.6 (12-12), Weight in k.6 (12-11), Weight in k (12-10), Weight in k.3 (-), Weight in k.6 (), Weight in k (-), Weight in k.4 ()  Dosing Weight (kg): 92.9 ()   - weight fluctuations noted, likely 2/2 fluid shifts with CRRT - will continue to monitor    MEDICATIONS  (STANDING):  aMIOdarone    Tablet   Oral   aMIOdarone    Tablet 400 milliGRAM(s) Oral every 8 hours  aMIOdarone Infusion 0.5 mG/Min (16.7 mL/Hr) IV Continuous <Continuous>  atorvastatin 40 milliGRAM(s) Oral at bedtime  chlorhexidine 0.12% Liquid 15 milliLiter(s) Oral Mucosa every 12 hours  chlorhexidine 2% Cloths 1 Application(s) Topical <User Schedule>  CRRT Treatment    <Continuous>  fluconAZOLE IVPB      fluconAZOLE IVPB 200 milliGRAM(s) IV Intermittent every 24 hours  heparin  Infusion 800 Unit(s)/Hr (12 mL/Hr) IV Continuous <Continuous>  insulin lispro (ADMELOG) corrective regimen sliding scale   SubCutaneous every 4 hours  meropenem  IVPB 1000 milliGRAM(s) IV Intermittent every 12 hours  milrinone Infusion 0.1 MICROgram(s)/kG/Min (2.79 mL/Hr) IV Continuous <Continuous>  pantoprazole  Injectable 40 milliGRAM(s) IV Push daily  Phoxillum Filtration BK 4 / 2.5 5000 milliLiter(s) (1000 mL/Hr) CRRT <Continuous>  polyethylene glycol 3350 17 Gram(s) Oral two times a day  PrismaSATE Dialysate BK 0 / 3.5 5000 milliLiter(s) (2000 mL/Hr) CRRT <Continuous>  PrismaSOL Filtration BGK 4 / 2.5 5000 milliLiter(s) (200 mL/Hr) CRRT <Continuous>  senna 2 Tablet(s) Oral at bedtime  sodium chloride 0.9%. 1000 milliLiter(s) (5 mL/Hr) IV Continuous <Continuous>  vancomycin  IVPB 1000 milliGRAM(s) IV Intermittent every 24 hours  vasopressin Infusion 0.033 Unit(s)/Min (5 mL/Hr) IV Continuous <Continuous>    Pertinent Labs: 12-12 @ 00:41: Na 134<L>, BUN 25<H>, Cr 1.55<H>, <H>, K+ 3.8, Phos 3.4, Mg 2.1, Alk Phos 134<H>, ALT/SGPT 18, AST/SGOT 44<H>, HbA1c --    Skin per nursing documentation: No pressure injuries noted.   Edema: 3+ generalized, 4+ left wrist; right wrist; left hand; right hand    Estimated Energy Needs: 1744 -2180kcal (20-25kcal/kg)  Estimated Protein Needs: 112-139g protein (1.6-2.0g/kg)  Defer fluids to team.   [place] State Equation: 1833kcal ()  Needs based on lowest daily wt 87.2kg () for energy & IBW 69.8kg for protein.     Previous Nutrition Diagnosis: Severe acute malnutrition & Increased nutrient needs  Nutrition Diagnosis is: [x] ongoing  [] resolved [] not applicable     New Nutrition Diagnosis: [x] Not applicable    Nutrition Care Plan:  [x] In Progress: see recommendations.   [] Achieved  [] Not applicable    Nutrition Interventions:     Education Provided:       [] Yes:  [x] No: Not appropriate at this time    Recommendations:      1. To better meet estimated nutrient needs, recommend change in EN regimen to Vital AF with goal rate of 65mL/hr x 24hr to provide 1560mL total volume, 1872kcal (21kcal/kg daily wt 87.2kg), 117g protein (1.7g/kg IBW 69.8kg), & 1265mL free water.    -- Free water flushes per team.   -- Discontinue Prosource supplement.  2. Add multivitamin and thiamine if not otherwise contraindicated. Monitor/replete electrolytes PRN.   3. Monitor GI tolerance to feeds, RD remains available to adjust TF regimen/formulary as needed/upon request.     RD remains available upon request and will follow up per protocol    Mala Han MS, RD, CDN, [facility] #[phone] Nutrition Follow Up Note  Patient seen for: Malnutrition initial follow-up. Chart reviewed, events noted.     Per chart, pt is 62M with PMH of CAD s/p 4v CABG ~2019 in [facility], DM2 (A1c 7.3%), and HTN who initially presented to OSH with abdominal pain and n/v and found to have pancreatitis with lipase > 3000 though also with elevated troponins. CT abdomen revealed acute pancreatitis and his initial LVEF was 40-45%. He developed MSOF with hypoxic respiratory failure requiring intubation and ERIC and went into hypoxic PEA arrest requiring ACLS and due to persistent hypoxia and hypotension on pressors after ROSC was cannulated to [facility] ECMO (LFV, RFA, no anterograde perfusion catheter) on . Notably CTH that day revealed small subdural hemorrhage. S/p ECMO decannulation to IABP on .     Source: [] Patient       [x] EMR        [] RN        [] Family at bedside       [] Other:    -If unable to interview patient: [x] Trach/Vent/BiPAP  [] Disoriented/confused/inappropriate to interview    Diet Order: Diet, NPO with Tube Feed:   Tube Feeding Modality: Nasogastric  Glucerna 1.2 [place] (GLUCERNARTH)  Total Volume for 24 Hours (mL): 1440  Continuous  Starting Tube Feed Rate {mL per Hour}: 60  Until Goal Tube Feed Rate (mL per Hour): 60  Tube Feed Duration (in Hours): 24  Tube Feed Start Time: 14:25  No Carb Prosource TF     Qty per Day:  1 (22 @ 19:02)    EN Order Provides: 1440mL total volume, 1768kcal (40kcal from Prosource), 97g protein (11g from Prosource), 1159mL free water.  Current Pump Rate: 20mL/hr  EN Provision per flow sheets:  ***Note feeds changed from Vital AF to Glucerna 1.2 on   - : 760mL  - 12/10: 580mL  - : 1020mL of Glucerna 1.2; 325mL of Vital AF  - : 910mL  - : 905mL    Nutrition-related concerns:   - Pt remains intubated, off sedation. Neurological changes noted.    - Vaso currently off. Pt on Milrinone gtt.    - Pt remains on CRRT. Electrolytes WNL. Hyponatremia noted.    - Sliding scale insulin for BG management.     GI: Last BM 12/10.  Bowel Regimen? [x] Yes   [] No   - Antibiotic/antifungal regimens noted     Weights:   Daily Weight in k.6 (12-12), Weight in k.6 (12-11), Weight in k (12-10), Weight in k.3 (-), Weight in k.6 (), Weight in k (-), Weight in k.4 ()  Dosing Weight (kg): 92.9 ()   - weight fluctuations noted, likely 2/2 fluid shifts with CRRT - will continue to monitor    MEDICATIONS  (STANDING):  aMIOdarone    Tablet   Oral   aMIOdarone    Tablet 400 milliGRAM(s) Oral every 8 hours  aMIOdarone Infusion 0.5 mG/Min (16.7 mL/Hr) IV Continuous <Continuous>  atorvastatin 40 milliGRAM(s) Oral at bedtime  chlorhexidine 0.12% Liquid 15 milliLiter(s) Oral Mucosa every 12 hours  chlorhexidine 2% Cloths 1 Application(s) Topical <User Schedule>  CRRT Treatment    <Continuous>  fluconAZOLE IVPB      fluconAZOLE IVPB 200 milliGRAM(s) IV Intermittent every 24 hours  heparin  Infusion 800 Unit(s)/Hr (12 mL/Hr) IV Continuous <Continuous>  insulin lispro (ADMELOG) corrective regimen sliding scale   SubCutaneous every 4 hours  meropenem  IVPB 1000 milliGRAM(s) IV Intermittent every 12 hours  milrinone Infusion 0.1 MICROgram(s)/kG/Min (2.79 mL/Hr) IV Continuous <Continuous>  pantoprazole  Injectable 40 milliGRAM(s) IV Push daily  Phoxillum Filtration BK 4 / 2.5 5000 milliLiter(s) (1000 mL/Hr) CRRT <Continuous>  polyethylene glycol 3350 17 Gram(s) Oral two times a day  PrismaSATE Dialysate BK 0 / 3.5 5000 milliLiter(s) (2000 mL/Hr) CRRT <Continuous>  PrismaSOL Filtration BGK 4 / 2.5 5000 milliLiter(s) (200 mL/Hr) CRRT <Continuous>  senna 2 Tablet(s) Oral at bedtime  sodium chloride 0.9%. 1000 milliLiter(s) (5 mL/Hr) IV Continuous <Continuous>  vancomycin  IVPB 1000 milliGRAM(s) IV Intermittent every 24 hours  vasopressin Infusion 0.033 Unit(s)/Min (5 mL/Hr) IV Continuous <Continuous>    Pertinent Labs: 12-12 @ 00:41: Na 134<L>, BUN 25<H>, Cr 1.55<H>, <H>, K+ 3.8, Phos 3.4, Mg 2.1, Alk Phos 134<H>, ALT/SGPT 18, AST/SGOT 44<H>, HbA1c --    Skin per nursing documentation: No pressure injuries noted.   Edema: 3+ generalized, 4+ left wrist; right wrist; left hand; right hand    Estimated Energy Needs: 1744 -2180kcal (20-25kcal/kg)  Estimated Protein Needs: 112-139g protein (1.6-2.0g/kg)  Defer fluids to team.   [place] State Equation: 1833kcal ()  Needs based on lowest daily wt 87.2kg () for energy & IBW 69.8kg for protein.     Previous Nutrition Diagnosis: Severe acute malnutrition & Increased nutrient needs  Nutrition Diagnosis is: [x] ongoing  [] resolved [] not applicable     New Nutrition Diagnosis: [x] Not applicable    Nutrition Care Plan:  [x] In Progress: see recommendations.   [] Achieved  [] Not applicable    Nutrition Interventions:     Education Provided:       [] Yes:  [x] No: Not appropriate at this time    Recommendations:      1. To better meet estimated nutrient needs, recommend change in EN regimen to Vital AF with goal rate of 65mL/hr x 24hr to provide 1560mL total volume, 1872kcal (21kcal/kg daily wt 87.2kg), 117g protein (1.7g/kg IBW 69.8kg), & 1265mL free water.    -- Free water flushes per team.   -- Discontinue Prosource supplement.  2. Add multivitamin and thiamine if not otherwise contraindicated. Monitor/replete electrolytes PRN.   3. Monitor GI tolerance to feeds, RD remains available to adjust TF regimen/formulary as needed/upon request.     RD remains available upon request and will follow up per protocol    Mala Han MS, RD, CDN, [facility] #[phone]

## 2022-12-13 LAB
ALBUMIN SERPL ELPH-MCNC: 2.6 G/DL — LOW (ref 3.3–5)
ALP SERPL-CCNC: 125 U/L — HIGH (ref 40–120)
ALT FLD-CCNC: 15 U/L — SIGNIFICANT CHANGE UP (ref 10–45)
AMYLASE P1 CFR SERPL: 226 U/L — HIGH (ref 25–125)
ANION GAP SERPL CALC-SCNC: 16 MMOL/L — SIGNIFICANT CHANGE UP (ref 5–17)
APTT BLD: 46.6 SEC — HIGH (ref 27.5–35.5)
APTT BLD: 53.3 SEC — HIGH (ref 27.5–35.5)
APTT BLD: 57.6 SEC — HIGH (ref 27.5–35.5)
APTT BLD: 62.2 SEC — HIGH (ref 27.5–35.5)
APTT BLD: 66.3 SEC — HIGH (ref 27.5–35.5)
AST SERPL-CCNC: 39 U/L — SIGNIFICANT CHANGE UP (ref 10–40)
BASE EXCESS BLDV CALC-SCNC: -2.4 MMOL/L — LOW (ref -2–3)
BILIRUB SERPL-MCNC: 1.2 MG/DL — SIGNIFICANT CHANGE UP (ref 0.2–1.2)
BLOOD GAS VENOUS - CREATININE: SIGNIFICANT CHANGE UP MG/DL (ref 0.5–1.3)
BUN SERPL-MCNC: 22 MG/DL — SIGNIFICANT CHANGE UP (ref 7–23)
CA-I SERPL-SCNC: 1.18 MMOL/L — SIGNIFICANT CHANGE UP (ref 1.15–1.33)
CALCIUM SERPL-MCNC: 8.5 MG/DL — SIGNIFICANT CHANGE UP (ref 8.4–10.5)
CHLORIDE BLDV-SCNC: 104 MMOL/L — SIGNIFICANT CHANGE UP (ref 96–108)
CHLORIDE SERPL-SCNC: 98 MMOL/L — SIGNIFICANT CHANGE UP (ref 96–108)
CO2 BLDV-SCNC: 24 MMOL/L — SIGNIFICANT CHANGE UP (ref 22–26)
CO2 SERPL-SCNC: 21 MMOL/L — LOW (ref 22–31)
CREAT SERPL-MCNC: 1.38 MG/DL — HIGH (ref 0.5–1.3)
EGFR: 58 ML/MIN/1.73M2 — LOW
FIBRINOGEN PPP-MCNC: 355 MG/DL — SIGNIFICANT CHANGE UP (ref 200–445)
FUNGITELL: <31 PG/ML — SIGNIFICANT CHANGE UP
GAS PNL BLDA: SIGNIFICANT CHANGE UP
GAS PNL BLDV: 133 MMOL/L — LOW (ref 136–145)
GAS PNL BLDV: SIGNIFICANT CHANGE UP
GLUCOSE BLDC GLUCOMTR-MCNC: 125 MG/DL — HIGH (ref 70–99)
GLUCOSE BLDC GLUCOMTR-MCNC: 127 MG/DL — HIGH (ref 70–99)
GLUCOSE BLDC GLUCOMTR-MCNC: 137 MG/DL — HIGH (ref 70–99)
GLUCOSE BLDC GLUCOMTR-MCNC: 140 MG/DL — HIGH (ref 70–99)
GLUCOSE BLDC GLUCOMTR-MCNC: 143 MG/DL — HIGH (ref 70–99)
GLUCOSE BLDC GLUCOMTR-MCNC: 147 MG/DL — HIGH (ref 70–99)
GLUCOSE BLDV-MCNC: 113 MG/DL — HIGH (ref 70–99)
GLUCOSE SERPL-MCNC: 124 MG/DL — HIGH (ref 70–99)
HAPTOGLOB SERPL-MCNC: <20 MG/DL — LOW (ref 34–200)
HCO3 BLDV-SCNC: 22 MMOL/L — SIGNIFICANT CHANGE UP (ref 22–29)
HCT VFR BLD CALC: 28.7 % — LOW (ref 39–50)
HCT VFR BLDA CALC: 27 % — LOW (ref 39–51)
HGB BLD CALC-MCNC: 9 G/DL — LOW (ref 12.6–17.4)
HGB BLD-MCNC: 9 G/DL — LOW (ref 13–17)
HOROWITZ INDEX BLDV+IHG-RTO: 40 — SIGNIFICANT CHANGE UP
INR BLD: 1.12 RATIO — SIGNIFICANT CHANGE UP (ref 0.88–1.16)
LACTATE BLDV-MCNC: 1.1 MMOL/L — SIGNIFICANT CHANGE UP (ref 0.5–2)
LDH SERPL L TO P-CCNC: 61 U/L — SIGNIFICANT CHANGE UP (ref 50–242)
LIDOCAIN IGE QN: 699 U/L — HIGH (ref 7–60)
MAGNESIUM SERPL-MCNC: 2.1 MG/DL — SIGNIFICANT CHANGE UP (ref 1.6–2.6)
MCHC RBC-ENTMCNC: 28.9 PG — SIGNIFICANT CHANGE UP (ref 27–34)
MCHC RBC-ENTMCNC: 31.4 GM/DL — LOW (ref 32–36)
MCV RBC AUTO: 92.3 FL — SIGNIFICANT CHANGE UP (ref 80–100)
NRBC # BLD: 2 /100 WBCS — HIGH (ref 0–0)
PCO2 BLDV: 38 MMHG — LOW (ref 42–55)
PH BLDV: 7.38 — SIGNIFICANT CHANGE UP (ref 7.32–7.43)
PHOSPHATE SERPL-MCNC: 3.5 MG/DL — SIGNIFICANT CHANGE UP (ref 2.5–4.5)
PLATELET # BLD AUTO: 128 K/UL — LOW (ref 150–400)
PO2 BLDV: 42 MMHG — SIGNIFICANT CHANGE UP (ref 25–45)
POTASSIUM BLDV-SCNC: 3 MMOL/L — LOW (ref 3.5–5.1)
POTASSIUM BLDV-SCNC: 3.2 MMOL/L — LOW (ref 3.5–5.1)
POTASSIUM SERPL-MCNC: 3.3 MMOL/L — LOW (ref 3.5–5.3)
POTASSIUM SERPL-SCNC: 3.3 MMOL/L — LOW (ref 3.5–5.3)
PROCALCITONIN SERPL-MCNC: 1.45 NG/ML — HIGH (ref 0.02–0.1)
PROT SERPL-MCNC: 6.1 G/DL — SIGNIFICANT CHANGE UP (ref 6–8.3)
PROTHROM AB SERPL-ACNC: 12.9 SEC — SIGNIFICANT CHANGE UP (ref 10.5–13.4)
RBC # BLD: 3.11 M/UL — LOW (ref 4.2–5.8)
RBC # FLD: 16.8 % — HIGH (ref 10.3–14.5)
SAO2 % BLDV: 71.2 % — SIGNIFICANT CHANGE UP (ref 67–88)
SODIUM SERPL-SCNC: 135 MMOL/L — SIGNIFICANT CHANGE UP (ref 135–145)
VANCOMYCIN TROUGH SERPL-MCNC: 12.3 UG/ML — SIGNIFICANT CHANGE UP (ref 10–20)
WBC # BLD: 17.35 K/UL — HIGH (ref 3.8–10.5)
WBC # FLD AUTO: 17.35 K/UL — HIGH (ref 3.8–10.5)

## 2022-12-13 PROCEDURE — 99291 CRITICAL CARE FIRST HOUR: CPT

## 2022-12-13 PROCEDURE — 99292 CRITICAL CARE ADDL 30 MIN: CPT

## 2022-12-13 PROCEDURE — 99233 SBSQ HOSP IP/OBS HIGH 50: CPT

## 2022-12-13 PROCEDURE — 70450 CT HEAD/BRAIN W/O DYE: CPT | Mod: 26

## 2022-12-13 PROCEDURE — 71045 X-RAY EXAM CHEST 1 VIEW: CPT | Mod: 26

## 2022-12-13 PROCEDURE — 99233 SBSQ HOSP IP/OBS HIGH 50: CPT | Mod: GC

## 2022-12-13 PROCEDURE — 99231 SBSQ HOSP IP/OBS SF/LOW 25: CPT

## 2022-12-13 PROCEDURE — 99232 SBSQ HOSP IP/OBS MODERATE 35: CPT

## 2022-12-13 RX ORDER — ONDANSETRON 8 MG/1
4 TABLET, FILM COATED ORAL ONCE
Refills: 0 | Status: COMPLETED | OUTPATIENT
Start: 2022-12-13 | End: 2022-12-13

## 2022-12-13 RX ORDER — METOCLOPRAMIDE HCL 10 MG
5 TABLET ORAL EVERY 8 HOURS
Refills: 0 | Status: DISCONTINUED | OUTPATIENT
Start: 2022-12-13 | End: 2022-12-16

## 2022-12-13 RX ORDER — LIDOCAINE 4 G/100G
1 CREAM TOPICAL DAILY
Refills: 0 | Status: DISCONTINUED | OUTPATIENT
Start: 2022-12-13 | End: 2022-12-16

## 2022-12-13 RX ORDER — ATORVASTATIN CALCIUM 80 MG/1
80 TABLET, FILM COATED ORAL AT BEDTIME
Refills: 0 | Status: DISCONTINUED | OUTPATIENT
Start: 2022-12-13 | End: 2022-12-16

## 2022-12-13 RX ORDER — POTASSIUM CHLORIDE 20 MEQ
10 PACKET (EA) ORAL ONCE
Refills: 0 | Status: COMPLETED | OUTPATIENT
Start: 2022-12-13 | End: 2022-12-13

## 2022-12-13 RX ORDER — POTASSIUM CHLORIDE 20 MEQ
10 PACKET (EA) ORAL
Refills: 0 | Status: COMPLETED | OUTPATIENT
Start: 2022-12-13 | End: 2022-12-13

## 2022-12-13 RX ADMIN — CHLORHEXIDINE GLUCONATE 15 MILLILITER(S): 213 SOLUTION TOPICAL at 17:26

## 2022-12-13 RX ADMIN — CHLORHEXIDINE GLUCONATE 15 MILLILITER(S): 213 SOLUTION TOPICAL at 05:49

## 2022-12-13 RX ADMIN — ATORVASTATIN CALCIUM 80 MILLIGRAM(S): 80 TABLET, FILM COATED ORAL at 22:12

## 2022-12-13 RX ADMIN — AMIODARONE HYDROCHLORIDE 400 MILLIGRAM(S): 400 TABLET ORAL at 22:12

## 2022-12-13 RX ADMIN — Medication 650 MILLIGRAM(S): at 04:00

## 2022-12-13 RX ADMIN — SENNA PLUS 2 TABLET(S): 8.6 TABLET ORAL at 22:12

## 2022-12-13 RX ADMIN — ONDANSETRON 4 MILLIGRAM(S): 8 TABLET, FILM COATED ORAL at 20:31

## 2022-12-13 RX ADMIN — MEROPENEM 100 MILLIGRAM(S): 1 INJECTION INTRAVENOUS at 17:49

## 2022-12-13 RX ADMIN — Medication 50 MILLIEQUIVALENT(S): at 07:32

## 2022-12-13 RX ADMIN — Medication 50 MILLIEQUIVALENT(S): at 06:08

## 2022-12-13 RX ADMIN — AMIODARONE HYDROCHLORIDE 400 MILLIGRAM(S): 400 TABLET ORAL at 01:48

## 2022-12-13 RX ADMIN — PANTOPRAZOLE SODIUM 40 MILLIGRAM(S): 20 TABLET, DELAYED RELEASE ORAL at 12:23

## 2022-12-13 RX ADMIN — MEROPENEM 100 MILLIGRAM(S): 1 INJECTION INTRAVENOUS at 05:45

## 2022-12-13 RX ADMIN — Medication 15 MILLILITER(S): at 12:23

## 2022-12-13 RX ADMIN — Medication 650 MILLIGRAM(S): at 03:22

## 2022-12-13 RX ADMIN — Medication 50 MILLIEQUIVALENT(S): at 06:42

## 2022-12-13 RX ADMIN — HEPARIN SODIUM 16 UNIT(S)/HR: 5000 INJECTION INTRAVENOUS; SUBCUTANEOUS at 20:28

## 2022-12-13 RX ADMIN — AMIODARONE HYDROCHLORIDE 400 MILLIGRAM(S): 400 TABLET ORAL at 10:18

## 2022-12-13 RX ADMIN — HEPARIN SODIUM 16.5 UNIT(S)/HR: 5000 INJECTION INTRAVENOUS; SUBCUTANEOUS at 07:32

## 2022-12-13 RX ADMIN — MILRINONE LACTATE 2.79 MICROGRAM(S)/KG/MIN: 1 INJECTION, SOLUTION INTRAVENOUS at 20:29

## 2022-12-13 RX ADMIN — ONDANSETRON 4 MILLIGRAM(S): 8 TABLET, FILM COATED ORAL at 04:52

## 2022-12-13 RX ADMIN — FLUCONAZOLE 100 MILLIGRAM(S): 150 TABLET ORAL at 05:50

## 2022-12-13 RX ADMIN — Medication 50 MILLIEQUIVALENT(S): at 02:24

## 2022-12-13 RX ADMIN — DEXMEDETOMIDINE HYDROCHLORIDE IN 0.9% SODIUM CHLORIDE 6.97 MICROGRAM(S)/KG/HR: 4 INJECTION INTRAVENOUS at 07:32

## 2022-12-13 RX ADMIN — CHLORHEXIDINE GLUCONATE 1 APPLICATION(S): 213 SOLUTION TOPICAL at 05:50

## 2022-12-13 RX ADMIN — HEPARIN SODIUM 16 UNIT(S)/HR: 5000 INJECTION INTRAVENOUS; SUBCUTANEOUS at 02:20

## 2022-12-13 RX ADMIN — SODIUM CHLORIDE 5 MILLILITER(S): 9 INJECTION INTRAMUSCULAR; INTRAVENOUS; SUBCUTANEOUS at 10:24

## 2022-12-13 RX ADMIN — DEXMEDETOMIDINE HYDROCHLORIDE IN 0.9% SODIUM CHLORIDE 6.97 MICROGRAM(S)/KG/HR: 4 INJECTION INTRAVENOUS at 20:29

## 2022-12-13 RX ADMIN — Medication 100 MILLIGRAM(S): at 12:25

## 2022-12-13 RX ADMIN — Medication 250 MILLIGRAM(S): at 08:11

## 2022-12-13 RX ADMIN — MILRINONE LACTATE 2.79 MICROGRAM(S)/KG/MIN: 1 INJECTION, SOLUTION INTRAVENOUS at 07:32

## 2022-12-13 NOTE — PROGRESS NOTE ADULT - ATTENDING COMMENTS
Patient remains critically ill. Currently supported with milrinone 0.1 and IABP 1:1 with adequate central sats. tomorrow will attempt to wean the IABP and assess HD response.

## 2022-12-13 NOTE — PROGRESS NOTE ADULT - SUBJECTIVE AND OBJECTIVE BOX
HPI:  61 YO M with a history of CAD s/p 4v CABG ~2019 in [facility], DM2 (A1c 7.3%), and HTN who initially presented to OSH with abdominal pain and n/v and found to have pancreatitis and elevated troponins. CT abdomen revealed acute pancreatitis and his initial LVEF was 40-45%. He developed MSOF with hypoxic respiratory failure requiring intubation and ERIC and went into hypoxic PEA arrest requiring ACLS and due to persistent hypoxia and hypotension on pressors after ROSC [facility]. Notably CTH that day revealed small subdural hemorrhage.     His post arrest TTE on 11/26 showed a signficantly worse LVEF of 5-10% with an AV that was only minimally opening. Geriatrics and Palliative Medicine (GaP) Team was called for GOC and support [facility]     12/13 He is now off of ECMO and on IBP. He is also on CVVHD. Mental status is improving. We are still f/u for GOC.     PERTINENT PM/SXH:       FAMILY HISTORY:  No h/o cardiac arrest.   Family Hx substance abuse [ ]yes [x ]no  ITEMS NOT CHECKED ARE NOT PRESENT    SOCIAL HISTORY:   Significant other/partner[ x]   Children[ ]  Pentecostalism/Spirituality:  Substance hx:  [ ]   Tobacco hx:  [ ]   Alcohol hx: [ ]   Home Opioid hx:  [ ] I-Stop Reference No:  Living Situation: [x ]Home  [ ]Long term care  [ ]Rehab [ ]Other    ADVANCE DIRECTIVES:    DNR/MOLST  [ ]  Living Will  [ ]   DECISION MAKER(s):  [ ] Health Care Proxy(s)  [ x] Surrogate(s)  [ ] Guardian           Name(s): Phone Number(s): Wife     BASELINE (I)ADL(s) (prior to admission):  [place]: [x ]Total  [ ] Moderate [ ]Dependent    Allergies    No Known Allergies    Intolerances    MEDICATIONS  (STANDING):  aMIOdarone    Tablet   Oral   aMIOdarone    Tablet 400 milliGRAM(s) Oral every 8 hours  atorvastatin 80 milliGRAM(s) Oral at bedtime  chlorhexidine 0.12% Liquid 15 milliLiter(s) Oral Mucosa every 12 hours  chlorhexidine 2% Cloths 1 Application(s) Topical <User Schedule>  CRRT Treatment    <Continuous>  dexMEDEtomidine Infusion 0.3 MICROgram(s)/kG/Hr (6.97 mL/Hr) IV Continuous <Continuous>  fluconAZOLE IVPB      fluconAZOLE IVPB 200 milliGRAM(s) IV Intermittent every 24 hours  heparin  Infusion 800 Unit(s)/Hr (16 mL/Hr) IV Continuous <Continuous>  insulin lispro (ADMELOG) corrective regimen sliding scale   SubCutaneous every 4 hours  meropenem  IVPB 1000 milliGRAM(s) IV Intermittent every 12 hours  milrinone Infusion 0.1 MICROgram(s)/kG/Min (2.79 mL/Hr) IV Continuous <Continuous>  multivitamin/minerals/iron Oral Solution (CENTRUM) 15 milliLiter(s) Oral daily  ondansetron Injectable 4 milliGRAM(s) IV Push once  pantoprazole  Injectable 40 milliGRAM(s) IV Push daily  Phoxillum Filtration BK 4 / 2.5 5000 milliLiter(s) (800 mL/Hr) CRRT <Continuous>  Phoxillum Filtration BK 4 / 2.5 5000 milliLiter(s) (2200 mL/Hr) CRRT <Continuous>  polyethylene glycol 3350 17 Gram(s) Oral two times a day  PrismaSOL Filtration BGK 4 / 2.5 5000 milliLiter(s) (200 mL/Hr) CRRT <Continuous>  senna 2 Tablet(s) Oral at bedtime  sodium chloride 0.9%. 1000 milliLiter(s) (5 mL/Hr) IV Continuous <Continuous>  thiamine 100 milliGRAM(s) Enteral Tube daily  vancomycin  IVPB 1000 milliGRAM(s) IV Intermittent every 24 hours    MEDICATIONS  (PRN):  acetaminophen    Suspension .. 650 milliGRAM(s) Enteral Tube every 6 hours PRN Temp greater or equal to 38C (100.4F), Mild Pain (1 - 3)  lidocaine   4% Patch 1 Patch Transdermal daily PRN pain      PRESENT SYMPTOMS: [ x]Unable to self-report  [ ] CPOT [ ] PAINADs [ ] RDOS  Source if other than patient:  [ ]Family   [ ]Team     Pain: [ ]yes [ x]no  QOL impact -   Location -                    Aggravating factors -  Quality -  Radiation -  Timing-  Severity (0-10 scale):  Minimal acceptable level (0-10 scale):     CPOT:    https://www.sccm.org/getattachment/[email or web address]/Critical-Care-Pain-Observation-Tool-(CPOT)    PAINAD Score: See PAINAD tool and score below     Dyspnea:                           [ ]Mild [ ]Moderate [ ]Severe    RDOS: See RDOS tool and score below   0 to 2  minimal or no respiratory distress   3  mild distress  4 to 6 moderate distress  >7 severe distress      Anxiety:                             [ ]Mild [ ]Moderate [ ]Severe  Fatigue:                             [ ]Mild [ ]Moderate [ ]Severe  Nausea:                             [ ]Mild [ ]Moderate [ ]Severe  Loss of appetite:              [ ]Mild [ ]Moderate [ ]Severe  Constipation:                    [ ]Mild [ ]Moderate [ ]Severe    PCSSQ[Palliative Care Spiritual Screening Question]   Severity (0-10):  Score of 4 or > indicate consideration of Chaplaincy referral.  Chaplaincy Referral: [ ] yes [x ] refused [ ] following [ ] Deferred     Caregiver [place]? : [x ] yes [ ] no [ ] Deferred [ ] Declined             Social work referral [x ] Patient & Family Centered Care Referral [ ]     Anticipatory Grief present?:  [x ] yes [ ] no  [ ] Deferred                  Social work referral [x ] Chaplaincy Referral [ ]    		  Other Symptoms:  [ ]All other review of systems negative     Palliative Performance Status Version 2:   See PPSv2 tool and score below          PHYSICAL EXAM:  Vital Signs Last 24 Hrs  T(C): 37.1 (13 Dec 2022 20:00), Max: 37.1 (13 Dec 2022 04:00)  T(F): 98.8 (13 Dec 2022 20:00), Max: 98.8 (13 Dec 2022 04:00)  HR: 102 (13 Dec 2022 20:00) (100 - 122)  BP: --  BP(mean): --  RR: 14 (13 Dec 2022 20:00) (9 - 34)  SpO2: 100% (13 Dec 2022 20:00) (97% - 100%)    Parameters below as of 13 Dec 2022 20:00  Patient On (Oxygen Delivery Method): ventilator    O2 Concentration (%): 40      CRITICAL CARE:  [ ] Shock Present  [ ]Septic [ ]Cardiogenic [ ]Neurologic [ ]Hypovolemic  [ ]  Vasopressors [ ]  Inotropes   [ ]Respiratory failure present [ ]Mechanical ventilation [ ]Non-invasive ventilatory support [ ]High flow  Mode: AC/ CMV (Assist Control/ Continuous Mandatory Ventilation), RR (machine): 14, FiO2: 50, PEEP: 10, ITime: 1, MAP: 14, PC: 10, PIP: 20  [ ]Acute  [ ]Chronic [ ]Hypoxic  [ ]Hypercarbic [ ]Other  [ ]Other organ failure     LABS:                                      9.0    17.35 )-----------( 128      ( 13 Dec 2022 01:08 )             28.7   12-13    135  |  98  |  22  ----------------------------<  124<H>  3.3<L>   |  21<L>  |  1.38<H>    Ca    8.5      13 Dec 2022 01:08  Phos  3.5     12-13  Mg     2.1     12-13    TPro  6.1  /  Alb  2.6<L>  /  TBili  1.2  /  DBili  x   /  AST  39  /  ALT  15  /  AlkPhos  125<H>  12-13      RADIOLOGY & ADDITIONAL STUDIES:  Reviewed   PROTEIN CALORIE MALNUTRITION PRESENT: [ ]mild [ ]moderate [ ]severe [ ]underweight [ ]morbid obesity  https://www.andeal.org/vault/2440/web/files/ONC/Table_Clinical%20Characteristics%20to%20Document%20Malnutrition-White%20JV%20et%20al%202012.pdf    Height (cm): 172.7 (11-24-22 @ 19:15)  Weight (kg): 92.9 (11-24-22 @ 07:48)  BMI (kg/m2): 31.1 (11-24-22 @ 19:15)    [ ]PPSV2 < or = to 30% [ ]significant weight loss  [ ]poor nutritional intake  [ ]anasarca[ ]Artificial Nutrition      Other REFERRALS:  [ ]Hospice  [ ]Child Life  [ ]Social Work  [ ]Case management [ ]Holistic Therapy      HPI:  61 YO M with a history of CAD s/p 4v CABG ~2019 in [facility], DM2 (A1c 7.3%), and HTN who initially presented to OSH with abdominal pain and n/v and found to have pancreatitis and elevated troponins. CT abdomen revealed acute pancreatitis and his initial LVEF was 40-45%. He developed MSOF with hypoxic respiratory failure requiring intubation and ERIC and went into hypoxic PEA arrest requiring ACLS and due to persistent hypoxia and hypotension on pressors after ROSC [facility]. Notably CTH that day revealed small subdural hemorrhage.     His post arrest TTE on 11/26 showed a signficantly worse LVEF of 5-10% with an AV that was only minimally opening. Geriatrics and Palliative Medicine (GaP) Team was called for GOC and support [facility]     12/13 He is now off of ECMO and on IBP. He is also on CVVHD. Mental status is improving. We are still f/u for GOC.     PERTINENT PM/SXH:       FAMILY HISTORY:  No h/o cardiac arrest.   Family Hx substance abuse [ ]yes [x ]no  ITEMS NOT CHECKED ARE NOT PRESENT    SOCIAL HISTORY:   Significant other/partner[ x]   Children[ ]  Protestant/Spirituality:  Substance hx:  [ ]   Tobacco hx:  [ ]   Alcohol hx: [ ]   Home Opioid hx:  [ ] I-Stop Reference No:  Living Situation: [x ]Home  [ ]Long term care  [ ]Rehab [ ]Other    ADVANCE DIRECTIVES:    DNR/MOLST  [ ]  Living Will  [ ]   DECISION MAKER(s):  [ ] Health Care Proxy(s)  [ x] Surrogate(s)  [ ] Guardian           Name(s): Phone Number(s): Wife     BASELINE (I)ADL(s) (prior to admission):  [place]: [x ]Total  [ ] Moderate [ ]Dependent    Allergies    No Known Allergies    Intolerances    MEDICATIONS  (STANDING):  aMIOdarone    Tablet   Oral   aMIOdarone    Tablet 400 milliGRAM(s) Oral every 8 hours  atorvastatin 80 milliGRAM(s) Oral at bedtime  chlorhexidine 0.12% Liquid 15 milliLiter(s) Oral Mucosa every 12 hours  chlorhexidine 2% Cloths 1 Application(s) Topical <User Schedule>  CRRT Treatment    <Continuous>  dexMEDEtomidine Infusion 0.3 MICROgram(s)/kG/Hr (6.97 mL/Hr) IV Continuous <Continuous>  fluconAZOLE IVPB      fluconAZOLE IVPB 200 milliGRAM(s) IV Intermittent every 24 hours  heparin  Infusion 800 Unit(s)/Hr (16 mL/Hr) IV Continuous <Continuous>  insulin lispro (ADMELOG) corrective regimen sliding scale   SubCutaneous every 4 hours  meropenem  IVPB 1000 milliGRAM(s) IV Intermittent every 12 hours  milrinone Infusion 0.1 MICROgram(s)/kG/Min (2.79 mL/Hr) IV Continuous <Continuous>  multivitamin/minerals/iron Oral Solution (CENTRUM) 15 milliLiter(s) Oral daily  ondansetron Injectable 4 milliGRAM(s) IV Push once  pantoprazole  Injectable 40 milliGRAM(s) IV Push daily  Phoxillum Filtration BK 4 / 2.5 5000 milliLiter(s) (800 mL/Hr) CRRT <Continuous>  Phoxillum Filtration BK 4 / 2.5 5000 milliLiter(s) (2200 mL/Hr) CRRT <Continuous>  polyethylene glycol 3350 17 Gram(s) Oral two times a day  PrismaSOL Filtration BGK 4 / 2.5 5000 milliLiter(s) (200 mL/Hr) CRRT <Continuous>  senna 2 Tablet(s) Oral at bedtime  sodium chloride 0.9%. 1000 milliLiter(s) (5 mL/Hr) IV Continuous <Continuous>  thiamine 100 milliGRAM(s) Enteral Tube daily  vancomycin  IVPB 1000 milliGRAM(s) IV Intermittent every 24 hours    MEDICATIONS  (PRN):  acetaminophen    Suspension .. 650 milliGRAM(s) Enteral Tube every 6 hours PRN Temp greater or equal to 38C (100.4F), Mild Pain (1 - 3)  lidocaine   4% Patch 1 Patch Transdermal daily PRN pain      PRESENT SYMPTOMS: [ x]Unable to self-report  [ ] CPOT [ ] PAINADs [ ] RDOS  Source if other than patient:  [ ]Family   [ ]Team     Pain: [ ]yes [ x]no  QOL impact -   Location -                    Aggravating factors -  Quality -  Radiation -  Timing-  Severity (0-10 scale):  Minimal acceptable level (0-10 scale):     CPOT:    https://www.sccm.org/getattachment/jrb67r95-9b0p-1l0l-8x0l-5185q2989x1b/Critical-Care-Pain-Observation-Tool-(CPOT)    PAINAD Score: See PAINAD tool and score below     Dyspnea:                           [ ]Mild [ ]Moderate [ ]Severe    RDOS: See RDOS tool and score below   0 to 2  minimal or no respiratory distress   3  mild distress  4 to 6 moderate distress  >7 severe distress      Anxiety:                             [ ]Mild [ ]Moderate [ ]Severe  Fatigue:                             [ ]Mild [ ]Moderate [ ]Severe  Nausea:                             [ ]Mild [ ]Moderate [ ]Severe  Loss of appetite:              [ ]Mild [ ]Moderate [ ]Severe  Constipation:                    [ ]Mild [ ]Moderate [ ]Severe    PCSSQ[Palliative Care Spiritual Screening Question]   Severity (0-10):  Score of 4 or > indicate consideration of Chaplaincy referral.  Chaplaincy Referral: [ ] yes [x ] refused [ ] following [ ] Deferred     Caregiver [place]? : [x ] yes [ ] no [ ] Deferred [ ] Declined             Social work referral [x ] Patient & Family Centered Care Referral [ ]     Anticipatory Grief present?:  [x ] yes [ ] no  [ ] Deferred                  Social work referral [x ] Chaplaincy Referral [ ]    		  Other Symptoms:  [ ]All other review of systems negative     Palliative Performance Status Version 2:   See PPSv2 tool and score below          PHYSICAL EXAM:  Vital Signs Last 24 Hrs  T(C): 37.1 (13 Dec 2022 20:00), Max: 37.1 (13 Dec 2022 04:00)  T(F): 98.8 (13 Dec 2022 20:00), Max: 98.8 (13 Dec 2022 04:00)  HR: 102 (13 Dec 2022 20:00) (100 - 122)  BP: --  BP(mean): --  RR: 14 (13 Dec 2022 20:00) (9 - 34)  SpO2: 100% (13 Dec 2022 20:00) (97% - 100%)    Parameters below as of 13 Dec 2022 20:00  Patient On (Oxygen Delivery Method): ventilator    O2 Concentration (%): 40      CRITICAL CARE:  [ ] Shock Present  [ ]Septic [ ]Cardiogenic [ ]Neurologic [ ]Hypovolemic  [ ]  Vasopressors [ ]  Inotropes   [ ]Respiratory failure present [ ]Mechanical ventilation [ ]Non-invasive ventilatory support [ ]High flow  Mode: AC/ CMV (Assist Control/ Continuous Mandatory Ventilation), RR (machine): 14, FiO2: 50, PEEP: 10, ITime: 1, MAP: 14, PC: 10, PIP: 20  [ ]Acute  [ ]Chronic [ ]Hypoxic  [ ]Hypercarbic [ ]Other  [ ]Other organ failure     LABS:                                      9.0    17.35 )-----------( 128      ( 13 Dec 2022 01:08 )             28.7   12-13    135  |  98  |  22  ----------------------------<  124<H>  3.3<L>   |  21<L>  |  1.38<H>    Ca    8.5      13 Dec 2022 01:08  Phos  3.5     12-13  Mg     2.1     12-13    TPro  6.1  /  Alb  2.6<L>  /  TBili  1.2  /  DBili  x   /  AST  39  /  ALT  15  /  AlkPhos  125<H>  12-13      RADIOLOGY & ADDITIONAL STUDIES:  Reviewed   PROTEIN CALORIE MALNUTRITION PRESENT: [ ]mild [ ]moderate [ ]severe [ ]underweight [ ]morbid obesity  https://www.andeal.org/vault/2440/web/files/ONC/Table_Clinical%20Characteristics%20to%20Document%20Malnutrition-White%20JV%20et%20al%202012.pdf    Height (cm): 172.7 (11-24-22 @ 19:15)  Weight (kg): 92.9 (11-24-22 @ 07:48)  BMI (kg/m2): 31.1 (11-24-22 @ 19:15)    [ ]PPSV2 < or = to 30% [ ]significant weight loss  [ ]poor nutritional intake  [ ]anasarca[ ]Artificial Nutrition      Other REFERRALS:  [ ]Hospice  [ ]Child Life  [ ]Social Work  [ ]Case management [ ]Holistic Therapy      HPI:  63 YO M with a history of CAD s/p 4v CABG ~2019 in [facility], DM2 (A1c 7.3%), and HTN who initially presented to OSH with abdominal pain and n/v and found to have pancreatitis and elevated troponins. CT abdomen revealed acute pancreatitis and his initial LVEF was 40-45%. He developed MSOF with hypoxic respiratory failure requiring intubation and ERIC and went into hypoxic PEA arrest requiring ACLS and due to persistent hypoxia and hypotension on pressors after ROSC [facility] 11[facility]. Notably CTH that day revealed small subdural hemorrhage.     His post arrest TTE on 11/26 showed a signficantly worse LVEF of 5-10% with an AV that was only minimally opening. Geriatrics and Palliative Medicine (GaP) Team was called for GOC and support through [facility]     12/13 He is now off of ECMO and on IBP. He is also on CVVHD. Mental status is improving. We are still f/u for GOC.     PERTINENT PM/SXH:       FAMILY HISTORY:  No h/o cardiac arrest.   Family Hx substance abuse [ ]yes [x ]no  ITEMS NOT CHECKED ARE NOT PRESENT    SOCIAL HISTORY:   Significant other/partner[ x]   Children[ ]  Mu-ism/Spirituality:  Substance hx:  [ ]   Tobacco hx:  [ ]   Alcohol hx: [ ]   Home Opioid hx:  [ ] I-Stop Reference No:  Living Situation: [x ]Home  [ ]Long term care  [ ]Rehab [ ]Other    ADVANCE DIRECTIVES:    DNR/MOLST  [ ]  Living Will  [ ]   DECISION MAKER(s):  [ ] Health Care Proxy(s)  [ x] Surrogate(s)  [ ] Guardian           Name(s): Phone Number(s): Wife     BASELINE (I)ADL(s) (prior to admission):  [place]: [x ]Total  [ ] Moderate [ ]Dependent    Allergies    No Known Allergies    Intolerances    MEDICATIONS  (STANDING):  aMIOdarone    Tablet   Oral   aMIOdarone    Tablet 400 milliGRAM(s) Oral every 8 hours  atorvastatin 80 milliGRAM(s) Oral at bedtime  chlorhexidine 0.12% Liquid 15 milliLiter(s) Oral Mucosa every 12 hours  chlorhexidine 2% Cloths 1 Application(s) Topical <User Schedule>  CRRT Treatment    <Continuous>  dexMEDEtomidine Infusion 0.3 MICROgram(s)/kG/Hr (6.97 mL/Hr) IV Continuous <Continuous>  fluconAZOLE IVPB      fluconAZOLE IVPB 200 milliGRAM(s) IV Intermittent every 24 hours  heparin  Infusion 800 Unit(s)/Hr (16 mL/Hr) IV Continuous <Continuous>  insulin lispro (ADMELOG) corrective regimen sliding scale   SubCutaneous every 4 hours  meropenem  IVPB 1000 milliGRAM(s) IV Intermittent every 12 hours  milrinone Infusion 0.1 MICROgram(s)/kG/Min (2.79 mL/Hr) IV Continuous <Continuous>  multivitamin/minerals/iron Oral Solution (CENTRUM) 15 milliLiter(s) Oral daily  ondansetron Injectable 4 milliGRAM(s) IV Push once  pantoprazole  Injectable 40 milliGRAM(s) IV Push daily  Phoxillum Filtration BK 4 / 2.5 5000 milliLiter(s) (800 mL/Hr) CRRT <Continuous>  Phoxillum Filtration BK 4 / 2.5 5000 milliLiter(s) (2200 mL/Hr) CRRT <Continuous>  polyethylene glycol 3350 17 Gram(s) Oral two times a day  PrismaSOL Filtration BGK 4 / 2.5 5000 milliLiter(s) (200 mL/Hr) CRRT <Continuous>  senna 2 Tablet(s) Oral at bedtime  sodium chloride 0.9%. 1000 milliLiter(s) (5 mL/Hr) IV Continuous <Continuous>  thiamine 100 milliGRAM(s) Enteral Tube daily  vancomycin  IVPB 1000 milliGRAM(s) IV Intermittent every 24 hours    MEDICATIONS  (PRN):  acetaminophen    Suspension .. 650 milliGRAM(s) Enteral Tube every 6 hours PRN Temp greater or equal to 38C (100.4F), Mild Pain (1 - 3)  lidocaine   4% Patch 1 Patch Transdermal daily PRN pain      PRESENT SYMPTOMS: [ x]Unable to self-report  [ ] CPOT [ ] PAINADs [ ] RDOS  Source if other than patient:  [ ]Family   [ ]Team     Pain: [ ]yes [ x]no  QOL impact -   Location -                    Aggravating factors -  Quality -  Radiation -  Timing-  Severity (0-10 scale):  Minimal acceptable level (0-10 scale):     CPOT:    https://www.sccm.org/getattachment/zcl85e04-8w2x-6l5b-8s6y-3081w7404f3y/Critical-Care-Pain-Observation-Tool-(CPOT)    PAINAD Score: See PAINAD tool and score below     Dyspnea:                           [ ]Mild [ ]Moderate [ ]Severe    RDOS: See RDOS tool and score below   0 to 2  minimal or no respiratory distress   3  mild distress  4 to 6 moderate distress  >7 severe distress      Anxiety:                             [ ]Mild [ ]Moderate [ ]Severe  Fatigue:                             [ ]Mild [ ]Moderate [ ]Severe  Nausea:                             [ ]Mild [ ]Moderate [ ]Severe  Loss of appetite:              [ ]Mild [ ]Moderate [ ]Severe  Constipation:                    [ ]Mild [ ]Moderate [ ]Severe    PCSSQ[Palliative Care Spiritual Screening Question]   Severity (0-10):  Score of 4 or > indicate consideration of Chaplaincy referral.  Chaplaincy Referral: [ ] yes [x ] refused [ ] following [ ] Deferred     Caregiver [place]? : [x ] yes [ ] no [ ] Deferred [ ] Declined             Social work referral [x ] Patient & Family Centered Care Referral [ ]     Anticipatory Grief present?:  [x ] yes [ ] no  [ ] Deferred                  Social work referral [x ] Chaplaincy Referral [ ]    		  Other Symptoms:  [ ]All other review of systems negative     Palliative Performance Status Version 2:   See PPSv2 tool and score below          PHYSICAL EXAM:  Vital Signs Last 24 Hrs  T(C): 37.1 (13 Dec 2022 20:00), Max: 37.1 (13 Dec 2022 04:00)  T(F): 98.8 (13 Dec 2022 20:00), Max: 98.8 (13 Dec 2022 04:00)  HR: 102 (13 Dec 2022 20:00) (100 - 122)  BP: --  BP(mean): --  RR: 14 (13 Dec 2022 20:00) (9 - 34)  SpO2: 100% (13 Dec 2022 20:00) (97% - 100%)    Parameters below as of 13 Dec 2022 20:00  Patient On (Oxygen Delivery Method): ventilator    O2 Concentration (%): 40      CRITICAL CARE:  [ ] Shock Present  [ ]Septic [ ]Cardiogenic [ ]Neurologic [ ]Hypovolemic  [ ]  Vasopressors [ ]  Inotropes   [ ]Respiratory failure present [ ]Mechanical ventilation [ ]Non-invasive ventilatory support [ ]High flow  Mode: AC/ CMV (Assist Control/ Continuous Mandatory Ventilation), RR (machine): 14, FiO2: 50, PEEP: 10, ITime: 1, MAP: 14, PC: 10, PIP: 20  [ ]Acute  [ ]Chronic [ ]Hypoxic  [ ]Hypercarbic [ ]Other  [ ]Other organ failure     LABS:                                      9.0    17.35 )-----------( 128      ( 13 Dec 2022 01:08 )             28.7   12-13    135  |  98  |  22  ----------------------------<  124<H>  3.3<L>   |  21<L>  |  1.38<H>    Ca    8.5      13 Dec 2022 01:08  Phos  3.5     12-13  Mg     2.1     12-13    TPro  6.1  /  Alb  2.6<L>  /  TBili  1.2  /  DBili  x   /  AST  39  /  ALT  15  /  AlkPhos  125<H>  12-13      RADIOLOGY & ADDITIONAL STUDIES:  Reviewed   PROTEIN CALORIE MALNUTRITION PRESENT: [ ]mild [ ]moderate [ ]severe [ ]underweight [ ]morbid obesity  https://www.andeal.org/vault/2440/web/files/ONC/Table_Clinical%20Characteristics%20to%20Document%20Malnutrition-White%20JV%20et%20al%202012.pdf    Height (cm): 172.7 (11-24-22 @ 19:15)  Weight (kg): 92.9 (11-24-22 @ 07:48)  BMI (kg/m2): 31.1 (11-24-22 @ 19:15)    [ ]PPSV2 < or = to 30% [ ]significant weight loss  [ ]poor nutritional intake  [ ]anasarca[ ]Artificial Nutrition      Other REFERRALS:  [ ]Hospice  [ ]Child Life  [ ]Social Work  [ ]Case management [ ]Holistic Therapy

## 2022-12-13 NOTE — PROGRESS NOTE ADULT - SUBJECTIVE AND OBJECTIVE BOX
Patient seen and examined at the bedside.    Remained critically ill on continuous ICU monitoring.      Brief Summary:  Cardiogenic shock s/p VA ECMO decannulated 12/8/22.     24 Hour events:      OBJECTIVE:  Vital Signs Last 24 Hrs  T(C): 36.8 (13 Dec 2022 08:00), Max: 37.1 (13 Dec 2022 04:00)  T(F): 98.2 (13 Dec 2022 08:00), Max: 98.8 (13 Dec 2022 04:00)  HR: 109 (13 Dec 2022 08:00) (100 - 123)  BP: --  BP(mean): --  RR: 19 (13 Dec 2022 08:00) (14 - 34)  SpO2: 100% (13 Dec 2022 08:00) (98% - 100%)    Parameters below as of 13 Dec 2022 08:00  Patient On (Oxygen Delivery Method): ventilator    O2 Concentration (%): 40    Mode: AC/ CMV (Assist Control/ Continuous Mandatory Ventilation)  RR (machine): 14  FiO2: 40  PEEP: 5  ITime: 1  MAP: 8  PC: 12  PIP: 26    Physical Exam:   General: Intubated, multiple lines gtt  Neurology: on/off sedation - Seems to track with eyes at times, not following commands, not withdrawing to noxious stimuli  Eyes: bilateral pupils equal and reactive   ENT/Neck: +ETT midline, Neck supple, trachea midline, No JVD   Respiratory: rhonchi bilaterally   CV: S1S2, no murmurs        [x] Sinus Rhythm   Abdominal: Softly distended, +BS   Extremities: 1+ pedal edema noted, + peripheral pulses palpable, warm, L Fem IABP  Skin: No Rashes, Hematoma, Ecchymosis   -------------------------------------------------------------------------------------------------------------------------------                        9.0    17.35 )-----------( 128      ( 13 Dec 2022 01:08 )             28.7     12-13    135  |  98  |  22  ----------------------------<  124<H>  3.3<L>   |  21<L>  |  1.38<H>    Ca    8.5      13 Dec 2022 01:08  Phos  3.5     12-13  Mg     2.1     12-13    TPro  6.1  /  Alb  2.6<L>  /  TBili  1.2  /  DBili  x   /  AST  39  /  ALT  15  /  AlkPhos  125<H>  12-13    LIVER FUNCTIONS - ( 13 Dec 2022 01:08 )  Alb: 2.6 g/dL / Pro: 6.1 g/dL / ALK PHOS: 125 U/L / ALT: 15 U/L / AST: 39 U/L / GGT: x           PT/INR - ( 13 Dec 2022 01:08 )   PT: 12.9 sec;   INR: 1.12 ratio         PTT - ( 13 Dec 2022 05:37 )  PTT:53.3 sec  ABG - ( 13 Dec 2022 00:00 )  pH, Arterial: 7.41  pH, Blood: x     /  pCO2: 35    /  pO2: 124   / HCO3: 22    / Base Excess: -2.1  /  SaO2: 98.3    ------------------------------------------------------------------------------------------------------------------------------  Assessment:  61 y/o male with PMH of CABG, DM, HTN, HLD presenting as transfer from [place] for c/f STEMI. PTA arrival received 325 aspirin, 600 plavix, and no heparin drip started. Around 1:30 am patient had multiple episodes of non-bloody diarrhea and emesis with associated abdominal pain and chest pain, unable to localize, non-radiating, with associated SOB and no associated palpitations or diaphoresis. No recent fevers/chills, cough, rashes, or changes in urination. Does report mild diffuse back pain; started 5 days ago in setting on injury while walking and lifting objects. Denies focal weakness, numbness/tingling, or LOC due does report mild dizziness.    Acute respiratory distress/failure, intubated    Cardiogenic shock s/p VA ECMO decannulated 12/8  CAD/ASHD/CABG, acute MI  Cardiogenic shock  Hemodynamic instability  Acute kidney injury  encounter management IABP  encounter weaning ventilator  Leukocytosis   Thrombocytopenia  Anemia  Acute pancreatitis    Plan:   ***Neuro***  CT head showed 4mm subdural hematoma 11/26: repeat CT head unchanged 12/01  Soft palate laceration, ENT scoped 12/8, stable, no acute intervention at this time  Patient sedated w/ precedex   Acute neuro change - Appreciate Neurology input  Follow up final EEG report.    ***Cardiovascular***  Invasive hemodynamic monitoring, assess perfusion indices   At high risk for hemodynamic instability and cardiac arrhythmias.  Lactate 1.1, continue trending   Continue low dose Milrinone for now.  L Fem IABP 1:1 with good augmentation   AC Therapy with Heparin gtt pAF/flutter PTT 44  Amiodarone for rate control  c/w Statin     ***Pulmonary***  Acute respiratory failure  Wean ventilator as tolerated.    ***GI***  Protein calorie malnutrition - Tube feeds via nasal feeding tube.  Pancreatitis   Protonix for stress ulcer prophylaxis   Bowel regimen with Miralax and Senna    ***Renal***  ERIC, renal following, continue CRRT  Gentle volume removal.  De Dios catheter for strict I/O measurements.  Replete electrolytes as indicated.    ***ID***  Vancomycin, Meropenem for empiric coverage    Diflucan for prophiolaxia   Follow up cultures.  Off Cefepime now in setting of neuro changes.    ***Endocrine***  Hx of DM2, continue glycemic control w/ ISS.      Patient requires continuous monitoring with bedside rhythm monitoring, pulse oximetry monitoring, and continuous central venous and arterial pressure monitoring; and intermittent blood gas analysis. Care plan discussed with the ICU care team.   Patient remained critical, at risk for life threatening decompensation.    I have spent 30 minutes providing critical care management to this patient.    By signing my name below, I, Rosio Alonzo, attest that this documentation has been prepared under the direction and in the presence of Jade L. Piper, MD  Electronically signed: Brian Burnett, 12-13-22 @ 08:23    I, Jade Rosas MD, personally performed the services described in this documentation. all medical record entries made by the scribe were at my direction and in my presence. I have reviewed the chart and agree that the record reflects my personal performance and is accurate and complete  Electronically signed: Jade Rosas MD Patient seen and examined at the bedside.    Remained critically ill on continuous ICU monitoring.      Brief Summary:  Cardiogenic shock s/p VA ECMO decannulated 12/8/22.     24 Hour events:      OBJECTIVE:  Vital Signs Last 24 Hrs  T(C): 36.8 (13 Dec 2022 08:00), Max: 37.1 (13 Dec 2022 04:00)  T(F): 98.2 (13 Dec 2022 08:00), Max: 98.8 (13 Dec 2022 04:00)  HR: 109 (13 Dec 2022 08:00) (100 - 123)  BP: --  BP(mean): --  RR: 19 (13 Dec 2022 08:00) (14 - 34)  SpO2: 100% (13 Dec 2022 08:00) (98% - 100%)    Parameters below as of 13 Dec 2022 08:00  Patient On (Oxygen Delivery Method): ventilator    O2 Concentration (%): 40    Mode: AC/ CMV (Assist Control/ Continuous Mandatory Ventilation)  RR (machine): 14  FiO2: 40  PEEP: 5  ITime: 1  MAP: 8  PC: 12  PIP: 26    Physical Exam:   General: Intubated, multiple lines gtt  Neurology: on/off sedation - Seems to track with eyes at times, not following commands, not withdrawing to noxious stimuli  Eyes: bilateral pupils equal and reactive   ENT/Neck: +ETT midline, Neck supple, trachea midline, No JVD   Respiratory: rhonchi bilaterally   CV: S1S2, no murmurs        [x] Sinus Rhythm   Abdominal: Softly distended, +BS   Extremities: 1+ pedal edema noted, + peripheral pulses palpable, warm, L Fem IABP  Skin: No Rashes, Hematoma, Ecchymosis   -------------------------------------------------------------------------------------------------------------------------------                        9.0    17.35 )-----------( 128      ( 13 Dec 2022 01:08 )             28.7     12-13    135  |  98  |  22  ----------------------------<  124<H>  3.3<L>   |  21<L>  |  1.38<H>    Ca    8.5      13 Dec 2022 01:08  Phos  3.5     12-13  Mg     2.1     12-13    TPro  6.1  /  Alb  2.6<L>  /  TBili  1.2  /  DBili  x   /  AST  39  /  ALT  15  /  AlkPhos  125<H>  12-13    LIVER FUNCTIONS - ( 13 Dec 2022 01:08 )  Alb: 2.6 g/dL / Pro: 6.1 g/dL / ALK PHOS: 125 U/L / ALT: 15 U/L / AST: 39 U/L / GGT: x           PT/INR - ( 13 Dec 2022 01:08 )   PT: 12.9 sec;   INR: 1.12 ratio         PTT - ( 13 Dec 2022 05:37 )  PTT:53.3 sec  ABG - ( 13 Dec 2022 00:00 )  pH, Arterial: 7.41  pH, Blood: x     /  pCO2: 35    /  pO2: 124   / HCO3: 22    / Base Excess: -2.1  /  SaO2: 98.3    ------------------------------------------------------------------------------------------------------------------------------  Assessment:  63 y/o male with PMH of CABG, DM, HTN, HLD presenting as transfer from [place] for c/f STEMI. PTA arrival received 325 aspirin, 600 plavix, and no heparin drip started. Around 1:30 am patient had multiple episodes of non-bloody diarrhea and emesis with associated abdominal pain and chest pain, unable to localize, non-radiating, with associated SOB and no associated palpitations or diaphoresis. No recent fevers/chills, cough, rashes, or changes in urination. Does report mild diffuse back pain; started 5 days ago in setting on injury while walking and lifting objects. Denies focal weakness, numbness/tingling, or LOC due does report mild dizziness.    Acute respiratory distress/failure, intubated    Cardiogenic shock s/p VA ECMO decannulated 12/8  CAD/ASHD/CABG, acute MI  Cardiogenic shock  Hemodynamic instability  Acute kidney injury  encounter management IABP  encounter weaning ventilator  Leukocytosis   Thrombocytopenia  Anemia  Acute pancreatitis    Plan:   ***Neuro***  CT head showed 4mm subdural hematoma 11/26: repeat CT head unchanged 12/01  Soft palate laceration, ENT scoped 12/8, stable, no acute intervention at this time  Patient sedated w/ precedex   Acute neuro change - Appreciate Neurology input  Follow up final EEG report.    ***Cardiovascular***  Invasive hemodynamic monitoring, assess perfusion indices   At high risk for hemodynamic instability and cardiac arrhythmias.  Lactate 1.1, continue trending   Continue low dose Milrinone for now.  L Fem IABP 1:1 with good augmentation   AC Therapy with Heparin gtt pAF/flutter PTT 44  Amiodarone for rate control  c/w Statin     ***Pulmonary***  Acute respiratory failure  Wean ventilator as tolerated.    ***GI***  Protein calorie malnutrition - Tube feeds via nasal feeding tube.  Pancreatitis   Protonix for stress ulcer prophylaxis   Bowel regimen with Miralax and Senna    ***Renal***  ERIC, renal following, continue CRRT  Gentle volume removal.  De Dios catheter for strict I/O measurements.  Replete electrolytes as indicated.    ***ID***  Vancomycin, Meropenem for empiric coverage    Diflucan for prophiolaxia   Follow up cultures.  Off Cefepime now in setting of neuro changes.    ***Endocrine***  Hx of DM2, continue glycemic control w/ ISS.      Patient requires continuous monitoring with bedside rhythm monitoring, pulse oximetry monitoring, and continuous central venous and arterial pressure monitoring; and intermittent blood gas analysis. Care plan discussed with the ICU care team.   Patient remained critical, at risk for life threatening decompensation.    I have spent 30 minutes providing critical care management to this patient.    By signing my name below, I, Rosio Alonzo, attest that this documentation has been prepared under the direction and in the presence of Jade L. Piper, MD  Electronically signed: Brian Burnett, 12-13-22 @ 08:23    I, Jade Rosas MD, personally performed the services described in this documentation. all medical record entries made by the scribe were at my direction and in my presence. I have reviewed the chart and agree that the record reflects my personal performance and is accurate and complete  Electronically signed: Jade Rosas MD Patient seen and examined at the bedside.    Remained critically ill on continuous ICU monitoring.      Brief Summary:  Cardiogenic shock s/p VA ECMO decannulated 12/8/22.     24 Hour events:      OBJECTIVE:  Vital Signs Last 24 Hrs  T(C): 36.8 (13 Dec 2022 08:00), Max: 37.1 (13 Dec 2022 04:00)  T(F): 98.2 (13 Dec 2022 08:00), Max: 98.8 (13 Dec 2022 04:00)  HR: 109 (13 Dec 2022 08:00) (100 - 123)  BP: --  BP(mean): --  RR: 19 (13 Dec 2022 08:00) (14 - 34)  SpO2: 100% (13 Dec 2022 08:00) (98% - 100%)    Parameters below as of 13 Dec 2022 08:00  Patient On (Oxygen Delivery Method): ventilator    O2 Concentration (%): 40    Mode: AC/ CMV (Assist Control/ Continuous Mandatory Ventilation)  RR (machine): 14  FiO2: 40  PEEP: 5  ITime: 1  MAP: 8  PC: 12  PIP: 26    Physical Exam:   General: Intubated, multiple lines gtt  Neurology: on/off sedation - Seems to track with eyes at times, not following commands, not withdrawing to noxious stimuli  Eyes: bilateral pupils equal and reactive   ENT/Neck: +ETT midline, Neck supple, trachea midline, No JVD   Respiratory: rhonchi bilaterally   CV: S1S2, no murmurs        [x] Sinus Rhythm   Abdominal: Softly distended, +BS   Extremities: 1+ pedal edema noted, + peripheral pulses palpable, warm, L Fem IABP  Skin: No Rashes, Hematoma, Ecchymosis   -------------------------------------------------------------------------------------------------------------------------------                        9.0    17.35 )-----------( 128      ( 13 Dec 2022 01:08 )             28.7     12-13    135  |  98  |  22  ----------------------------<  124<H>  3.3<L>   |  21<L>  |  1.38<H>    Ca    8.5      13 Dec 2022 01:08  Phos  3.5     12-13  Mg     2.1     12-13    TPro  6.1  /  Alb  2.6<L>  /  TBili  1.2  /  DBili  x   /  AST  39  /  ALT  15  /  AlkPhos  125<H>  12-13    LIVER FUNCTIONS - ( 13 Dec 2022 01:08 )  Alb: 2.6 g/dL / Pro: 6.1 g/dL / ALK PHOS: 125 U/L / ALT: 15 U/L / AST: 39 U/L / GGT: x           PT/INR - ( 13 Dec 2022 01:08 )   PT: 12.9 sec;   INR: 1.12 ratio         PTT - ( 13 Dec 2022 05:37 )  PTT:53.3 sec  ABG - ( 13 Dec 2022 00:00 )  pH, Arterial: 7.41  pH, Blood: x     /  pCO2: 35    /  pO2: 124   / HCO3: 22    / Base Excess: -2.1  /  SaO2: 98.3    ------------------------------------------------------------------------------------------------------------------------------  Assessment:  61 y/o male with PMH of CABG, DM, HTN, HLD presenting as transfer from [place] for c/f STEMI. PTA arrival received 325 aspirin, 600 plavix, and no heparin drip started. Around 1:30 am patient had multiple episodes of non-bloody diarrhea and emesis with associated abdominal pain and chest pain, unable to localize, non-radiating, with associated SOB and no associated palpitations or diaphoresis. No recent fevers/chills, cough, rashes, or changes in urination. Does report mild diffuse back pain; started 5 days ago in setting on injury while walking and lifting objects. Denies focal weakness, numbness/tingling, or LOC due does report mild dizziness.    Acute respiratory distress/failure, intubated    Cardiogenic shock s/p VA ECMO decannulated 12/8  CAD/ASHD/CABG, acute MI  Cardiogenic shock  Hemodynamic instability  Acute kidney injury  encounter management IABP  encounter weaning ventilator  Leukocytosis   Thrombocytopenia  Anemia  Acute pancreatitis    Plan:   ***Neuro***  CT head showed 4mm subdural hematoma 11/26: repeat CT head unchanged 12/01  Soft palate laceration, ENT scoped 12/8, stable, no acute intervention at this time  Patient sedated w/ precedex   Acute neuro change - Appreciate Neurology input  Follow up final EEG report.    ***Cardiovascular***  Invasive hemodynamic monitoring, assess perfusion indices   At high risk for hemodynamic instability and cardiac arrhythmias.  Lactate 1.1, continue trending   Continue low dose Milrinone for now.  L Fem IABP 1:1 with good augmentation   AC Therapy with Heparin gtt pAF/flutter PTT 44  Amiodarone for rate control  c/w Statin     ***Pulmonary***  Acute respiratory failure  Wean ventilator as tolerated.    ***GI***  Protein calorie malnutrition - Tube feeds via nasal feeding tube.  Pancreatitis   Protonix for stress ulcer prophylaxis   Bowel regimen with Miralax and Senna    ***Renal***  ERIC, renal following, continue CRRT  Gentle volume removal.  De Dios catheter for strict I/O measurements.  Replete electrolytes as indicated.    ***ID***  Vancomycin, Meropenem for empiric coverage    Diflucan for prophiolaxia   Follow up cultures.  Off Cefepime now in setting of neuro changes.    ***Endocrine***  Hx of DM2, continue glycemic control w/ ISS.      Patient requires continuous monitoring with bedside rhythm monitoring, pulse oximetry monitoring, and continuous central venous and arterial pressure monitoring; and intermittent blood gas analysis. Care plan discussed with the ICU care team.   Patient remained critical, at risk for life threatening decompensation.    I have spent 30 minutes providing critical care management to this patient.    By signing my name below, I, Rosio Alonzo, attest that this documentation has been prepared under the direction and in the presence of Jade L. Piper, MD  Electronically signed: Brian Burnett, 12-13-22 @ 08:23    I, Jade Rosas MD, personally performed the services described in this documentation. all medical record entries made by the scribe were at my direction and in my presence. I have reviewed the chart and agree that the record reflects my personal performance and is accurate and complete  Electronically signed: Jade Rosas MD Patient seen and examined at the bedside.    Remained critically ill on continuous ICU monitoring.      Brief Summary:  Cardiogenic shock s/p VA ECMO decannulated 12/8/22.     24 Hour events:  - Head CT was negative  - Wean vent settings as tolerated     OBJECTIVE:  Vital Signs Last 24 Hrs  T(C): 36.8 (13 Dec 2022 08:00), Max: 37.1 (13 Dec 2022 04:00)  T(F): 98.2 (13 Dec 2022 08:00), Max: 98.8 (13 Dec 2022 04:00)  HR: 109 (13 Dec 2022 08:00) (100 - 123)  BP: --  BP(mean): --  RR: 19 (13 Dec 2022 08:00) (14 - 34)  SpO2: 100% (13 Dec 2022 08:00) (98% - 100%)    Parameters below as of 13 Dec 2022 08:00  Patient On (Oxygen Delivery Method): ventilator    O2 Concentration (%): 40    Mode: AC/ CMV (Assist Control/ Continuous Mandatory Ventilation)  RR (machine): 14  FiO2: 40  PEEP: 5  ITime: 1  MAP: 8  PC: 12  PIP: 26    Physical Exam:   General: Intubated, multiple lines gtt  Neurology: on/off sedation - Seems to track with eyes at times, not following commands, not withdrawing to noxious stimuli  Eyes: bilateral pupils equal and reactive   ENT/Neck: +ETT midline, Neck supple, trachea midline, No JVD   Respiratory: rhonchi bilaterally   CV: S1S2, no murmurs        [x] Sinus Rhythm   Abdominal: Softly distended, +BS   Extremities: 1+ pedal edema noted, + peripheral pulses palpable, warm, L Fem IABP  Skin: No Rashes, Hematoma, Ecchymosis   -------------------------------------------------------------------------------------------------------------------------------                        9.0    17.35 )-----------( 128      ( 13 Dec 2022 01:08 )             28.7     12-13    135  |  98  |  22  ----------------------------<  124<H>  3.3<L>   |  21<L>  |  1.38<H>    Ca    8.5      13 Dec 2022 01:08  Phos  3.5     12-13  Mg     2.1     12-13    TPro  6.1  /  Alb  2.6<L>  /  TBili  1.2  /  DBili  x   /  AST  39  /  ALT  15  /  AlkPhos  125<H>  12-13    LIVER FUNCTIONS - ( 13 Dec 2022 01:08 )  Alb: 2.6 g/dL / Pro: 6.1 g/dL / ALK PHOS: 125 U/L / ALT: 15 U/L / AST: 39 U/L / GGT: x           PT/INR - ( 13 Dec 2022 01:08 )   PT: 12.9 sec;   INR: 1.12 ratio         PTT - ( 13 Dec 2022 05:37 )  PTT:53.3 sec  ABG - ( 13 Dec 2022 00:00 )  pH, Arterial: 7.41  pH, Blood: x     /  pCO2: 35    /  pO2: 124   / HCO3: 22    / Base Excess: -2.1  /  SaO2: 98.3    ------------------------------------------------------------------------------------------------------------------------------  Assessment:  63 y/o male with PMH of CABG, DM, HTN, HLD presenting as transfer from [place] for c/f STEMI. PTA arrival received 325 aspirin, 600 plavix, and no heparin drip started. Around 1:30 am patient had multiple episodes of non-bloody diarrhea and emesis with associated abdominal pain and chest pain, unable to localize, non-radiating, with associated SOB and no associated palpitations or diaphoresis. No recent fevers/chills, cough, rashes, or changes in urination. Does report mild diffuse back pain; started 5 days ago in setting on injury while walking and lifting objects. Denies focal weakness, numbness/tingling, or LOC due does report mild dizziness.    Acute respiratory distress/failure, intubated    Cardiogenic shock s/p VA ECMO decannulated 12/8  CAD/ASHD/CABG, acute MI  Cardiogenic shock  Hemodynamic instability  Acute kidney injury  encounter management IABP  encounter weaning ventilator  Leukocytosis   Thrombocytopenia  Anemia  Acute pancreatitis    Plan:   ***Neuro***  CT head showed 4mm subdural hematoma 11/26: repeat CT head unchanged 12/01  Soft palate laceration, ENT scoped 12/8, stable, no acute intervention at this time  Patient sedated w/ precedex   Acute neuro change - Appreciate Neurology input  Follow up final EEG report.    ***Cardiovascular***  Invasive hemodynamic monitoring, assess perfusion indices   At high risk for hemodynamic instability and cardiac arrhythmias.  Lactate 1.1, continue trending   Continue low dose Milrinone for now.  L Fem IABP 1:1 with good augmentation   AC Therapy with Heparin gtt pAF/flutter PTT 44  Amiodarone for rate control  c/w Statin     ***Pulmonary***  Acute respiratory failure  Wean ventilator as tolerated.    ***GI***  Protein calorie malnutrition - Tube feeds via nasal feeding tube.  Pancreatitis   Protonix for stress ulcer prophylaxis   Bowel regimen with Miralax and Senna    ***Renal***  ERIC, renal following, continue CRRT  Gentle volume removal.  De Dios catheter for strict I/O measurements.  Replete electrolytes as indicated.    ***ID***  Vancomycin, Meropenem for empiric coverage    Diflucan for prophiolaxia   Follow up cultures.  Off Cefepime now in setting of neuro changes.    ***Endocrine***  Hx of DM2, continue glycemic control w/ ISS.      Patient requires continuous monitoring with bedside rhythm monitoring, pulse oximetry monitoring, and continuous central venous and arterial pressure monitoring; and intermittent blood gas analysis. Care plan discussed with the ICU care team.   Patient remained critical, at risk for life threatening decompensation.    I have spent 30 minutes providing critical care management to this patient.    By signing my name below, I, Rosio Alonzo, attest that this documentation has been prepared under the direction and in the presence of Jade Rosas MD  Electronically signed: Brian Burnett, 12-13-22 @ 08:23    I, Jade Rosas MD, personally performed the services described in this documentation. all medical record entries made by the scribe were at my direction and in my presence. I have reviewed the chart and agree that the record reflects my personal performance and is accurate and complete  Electronically signed: Jade Rosas MD Patient seen and examined at the bedside.    Remained critically ill on continuous ICU monitoring.      Brief Summary:  Cardiogenic shock s/p VA ECMO decannulated 12/8/22.     24 Hour events:  - Head CT was negative  - Wean vent settings as tolerated     OBJECTIVE:  Vital Signs Last 24 Hrs  T(C): 36.8 (13 Dec 2022 08:00), Max: 37.1 (13 Dec 2022 04:00)  T(F): 98.2 (13 Dec 2022 08:00), Max: 98.8 (13 Dec 2022 04:00)  HR: 109 (13 Dec 2022 08:00) (100 - 123)  BP: --  BP(mean): --  RR: 19 (13 Dec 2022 08:00) (14 - 34)  SpO2: 100% (13 Dec 2022 08:00) (98% - 100%)    Parameters below as of 13 Dec 2022 08:00  Patient On (Oxygen Delivery Method): ventilator    O2 Concentration (%): 40    Mode: AC/ CMV (Assist Control/ Continuous Mandatory Ventilation)  RR (machine): 14  FiO2: 40  PEEP: 5  ITime: 1  MAP: 8  PC: 12  PIP: 26    Physical Exam:   General: Intubated, multiple lines gtt  Neurology: on/off sedation - Seems to track with eyes at times, not following commands, not withdrawing to noxious stimuli  Eyes: bilateral pupils equal and reactive   ENT/Neck: +ETT midline, Neck supple, trachea midline, No JVD   Respiratory: rhonchi bilaterally   CV: S1S2, no murmurs        [x] Sinus Rhythm   Abdominal: Softly distended, +BS   Extremities: 1+ pedal edema noted, + peripheral pulses palpable, warm, L Fem IABP  Skin: No Rashes, Hematoma, Ecchymosis   -------------------------------------------------------------------------------------------------------------------------------                        9.0    17.35 )-----------( 128      ( 13 Dec 2022 01:08 )             28.7     12-13    135  |  98  |  22  ----------------------------<  124<H>  3.3<L>   |  21<L>  |  1.38<H>    Ca    8.5      13 Dec 2022 01:08  Phos  3.5     12-13  Mg     2.1     12-13    TPro  6.1  /  Alb  2.6<L>  /  TBili  1.2  /  DBili  x   /  AST  39  /  ALT  15  /  AlkPhos  125<H>  12-13    LIVER FUNCTIONS - ( 13 Dec 2022 01:08 )  Alb: 2.6 g/dL / Pro: 6.1 g/dL / ALK PHOS: 125 U/L / ALT: 15 U/L / AST: 39 U/L / GGT: x           PT/INR - ( 13 Dec 2022 01:08 )   PT: 12.9 sec;   INR: 1.12 ratio         PTT - ( 13 Dec 2022 05:37 )  PTT:53.3 sec  ABG - ( 13 Dec 2022 00:00 )  pH, Arterial: 7.41  pH, Blood: x     /  pCO2: 35    /  pO2: 124   / HCO3: 22    / Base Excess: -2.1  /  SaO2: 98.3    ------------------------------------------------------------------------------------------------------------------------------  Assessment:  61 y/o male with PMH of CABG, DM, HTN, HLD presenting as transfer from [place] for c/f STEMI. PTA arrival received 325 aspirin, 600 plavix, and no heparin drip started. Around 1:30 am patient had multiple episodes of non-bloody diarrhea and emesis with associated abdominal pain and chest pain, unable to localize, non-radiating, with associated SOB and no associated palpitations or diaphoresis. No recent fevers/chills, cough, rashes, or changes in urination. Does report mild diffuse back pain; started 5 days ago in setting on injury while walking and lifting objects. Denies focal weakness, numbness/tingling, or LOC due does report mild dizziness.    Acute respiratory distress/failure, intubated    Cardiogenic shock s/p VA ECMO decannulated 12/8  CAD/ASHD/CABG, acute MI  Cardiogenic shock  Hemodynamic instability  Acute kidney injury  encounter management IABP  encounter weaning ventilator  Leukocytosis   Thrombocytopenia  Anemia  Acute pancreatitis    Plan:   ***Neuro***  CT head showed 4mm subdural hematoma 11/26: repeat CT head unchanged 12/01  Soft palate laceration, ENT scoped 12/8, stable, no acute intervention at this time  Patient sedated w/ precedex   Acute neuro change - Appreciate Neurology input  Follow up final EEG report.    ***Cardiovascular***  Invasive hemodynamic monitoring, assess perfusion indices   At high risk for hemodynamic instability and cardiac arrhythmias.  Lactate 1.1, continue trending   Continue low dose Milrinone for now.  L Fem IABP 1:1 with good augmentation   AC Therapy with Heparin gtt pAF/flutter PTT 44  Amiodarone for rate control  c/w Statin     ***Pulmonary***  Acute respiratory failure  Wean ventilator as tolerated.    ***GI***  Protein calorie malnutrition - Tube feeds via nasal feeding tube.  Pancreatitis   Protonix for stress ulcer prophylaxis   Bowel regimen with Miralax and Senna    ***Renal***  ERIC, renal following, continue CRRT  Gentle volume removal.  De Dios catheter for strict I/O measurements.  Replete electrolytes as indicated.    ***ID***  Vancomycin, Meropenem for empiric coverage    Diflucan for prophiolaxia   Follow up cultures.  Off Cefepime now in setting of neuro changes.    ***Endocrine***  Hx of DM2, continue glycemic control w/ ISS.      Patient requires continuous monitoring with bedside rhythm monitoring, pulse oximetry monitoring, and continuous central venous and arterial pressure monitoring; and intermittent blood gas analysis. Care plan discussed with the ICU care team.   Patient remained critical, at risk for life threatening decompensation.    I have spent 30 minutes providing critical care management to this patient.    By signing my name below, I, Rosio Alonzo, attest that this documentation has been prepared under the direction and in the presence of Jade Rosas MD  Electronically signed: Brian Burnett, 12-13-22 @ 08:23    I, Jade Rosas MD, personally performed the services described in this documentation. all medical record entries made by the scribe were at my direction and in my presence. I have reviewed the chart and agree that the record reflects my personal performance and is accurate and complete  Electronically signed: Jade Rosas MD Patient seen and examined at the bedside.    Remained critically ill on continuous ICU monitoring.      Brief Summary:  Cardiogenic shock s/p VA ECMO decannulated 12/8/22.     24 Hour events:  - Head CT was negative  - Wean vent settings as tolerated     OBJECTIVE:  Vital Signs Last 24 Hrs  T(C): 36.8 (13 Dec 2022 08:00), Max: 37.1 (13 Dec 2022 04:00)  T(F): 98.2 (13 Dec 2022 08:00), Max: 98.8 (13 Dec 2022 04:00)  HR: 109 (13 Dec 2022 08:00) (100 - 123)  BP: --  BP(mean): --  RR: 19 (13 Dec 2022 08:00) (14 - 34)  SpO2: 100% (13 Dec 2022 08:00) (98% - 100%)    Parameters below as of 13 Dec 2022 08:00  Patient On (Oxygen Delivery Method): ventilator    O2 Concentration (%): 40    Mode: AC/ CMV (Assist Control/ Continuous Mandatory Ventilation)  RR (machine): 14  FiO2: 40  PEEP: 5  ITime: 1  MAP: 8  PC: 12  PIP: 26    Physical Exam:   General: Intubated, multiple lines gtt  Neurology: on/off sedation - Seems to track with eyes at times, not following commands, not withdrawing to noxious stimuli  Eyes: bilateral pupils equal and reactive   ENT/Neck: +ETT midline, Neck supple, trachea midline, No JVD   Respiratory: rhonchi bilaterally   CV: S1S2, no murmurs        [x] Sinus Rhythm   Abdominal: Softly distended, +BS   Extremities: 1+ pedal edema noted, + peripheral pulses palpable, warm, L Fem IABP  Skin: No Rashes, Hematoma, Ecchymosis   -------------------------------------------------------------------------------------------------------------------------------                        9.0    17.35 )-----------( 128      ( 13 Dec 2022 01:08 )             28.7     12-13    135  |  98  |  22  ----------------------------<  124<H>  3.3<L>   |  21<L>  |  1.38<H>    Ca    8.5      13 Dec 2022 01:08  Phos  3.5     12-13  Mg     2.1     12-13    TPro  6.1  /  Alb  2.6<L>  /  TBili  1.2  /  DBili  x   /  AST  39  /  ALT  15  /  AlkPhos  125<H>  12-13    LIVER FUNCTIONS - ( 13 Dec 2022 01:08 )  Alb: 2.6 g/dL / Pro: 6.1 g/dL / ALK PHOS: 125 U/L / ALT: 15 U/L / AST: 39 U/L / GGT: x           PT/INR - ( 13 Dec 2022 01:08 )   PT: 12.9 sec;   INR: 1.12 ratio         PTT - ( 13 Dec 2022 05:37 )  PTT:53.3 sec  ABG - ( 13 Dec 2022 00:00 )  pH, Arterial: 7.41  pH, Blood: x     /  pCO2: 35    /  pO2: 124   / HCO3: 22    / Base Excess: -2.1  /  SaO2: 98.3    ------------------------------------------------------------------------------------------------------------------------------  Assessment:  63 y/o male with PMH of CABG, DM, HTN, HLD presenting as transfer from [place] for c/f STEMI. PTA arrival received 325 aspirin, 600 plavix, and no heparin drip started. Around 1:30 am patient had multiple episodes of non-bloody diarrhea and emesis with associated abdominal pain and chest pain, unable to localize, non-radiating, with associated SOB and no associated palpitations or diaphoresis. No recent fevers/chills, cough, rashes, or changes in urination. Does report mild diffuse back pain; started 5 days ago in setting on injury while walking and lifting objects. Denies focal weakness, numbness/tingling, or LOC due does report mild dizziness.    Acute respiratory distress/failure, intubated    Cardiogenic shock s/p VA ECMO decannulated 12/8  CAD/ASHD/CABG, acute MI  Cardiogenic shock  Hemodynamic instability  Acute kidney injury  encounter management IABP  encounter weaning ventilator  Leukocytosis   Thrombocytopenia  Anemia  Acute pancreatitis    Plan:   ***Neuro***  CT head showed 4mm subdural hematoma 11/26: repeat CT head unchanged 12/01  Soft palate laceration, ENT scoped 12/8, stable, no acute intervention at this time  Patient sedated w/ precedex   Acute neuro change - Appreciate Neurology input  Follow up final EEG report.    ***Cardiovascular***  Invasive hemodynamic monitoring, assess perfusion indices   At high risk for hemodynamic instability and cardiac arrhythmias.  Lactate 1.1, continue trending   Continue low dose Milrinone for now.  L Fem IABP 1:1 with good augmentation   AC Therapy with Heparin gtt pAF/flutter PTT 44  Amiodarone for rate control  c/w Statin     ***Pulmonary***  Acute respiratory failure  Wean ventilator as tolerated.    ***GI***  Protein calorie malnutrition - Tube feeds via nasal feeding tube.  Pancreatitis   Protonix for stress ulcer prophylaxis   Bowel regimen with Miralax and Senna    ***Renal***  ERIC, renal following, continue CRRT  Gentle volume removal.  De Dios catheter for strict I/O measurements.  Replete electrolytes as indicated.    ***ID***  Vancomycin, Meropenem for empiric coverage    Diflucan for prophiolaxia   Follow up cultures.  Off Cefepime now in setting of neuro changes.    ***Endocrine***  Hx of DM2, continue glycemic control w/ ISS.      Patient requires continuous monitoring with bedside rhythm monitoring, pulse oximetry monitoring, and continuous central venous and arterial pressure monitoring; and intermittent blood gas analysis. Care plan discussed with the ICU care team.   Patient remained critical, at risk for life threatening decompensation.    I have spent 30 minutes providing critical care management to this patient.    By signing my name below, I, Rosio Alonzo, attest that this documentation has been prepared under the direction and in the presence of Jade Rosas MD  Electronically signed: Brian Burnett, 12-13-22 @ 08:23    I, Jade Rosas MD, personally performed the services described in this documentation. all medical record entries made by the scribe were at my direction and in my presence. I have reviewed the chart and agree that the record reflects my personal performance and is accurate and complete  Electronically signed: Jade Rosas MD Patient seen and examined at the bedside.    Remained critically ill on continuous ICU monitoring.      Brief Summary:  Cardiogenic shock s/p VA ECMO decannulated 12/8/22. Currently supported with an IABP.    24 Hour events:  - Head CT was unremarkable  - Now awake and following simple commands  - Tolerating pressure support.    OBJECTIVE:  Vital Signs Last 24 Hrs  T(C): 36.8 (13 Dec 2022 08:00), Max: 37.1 (13 Dec 2022 04:00)  T(F): 98.2 (13 Dec 2022 08:00), Max: 98.8 (13 Dec 2022 04:00)  HR: 109 (13 Dec 2022 08:00) (100 - 123)  BP: --  BP(mean): --  RR: 19 (13 Dec 2022 08:00) (14 - 34)  SpO2: 100% (13 Dec 2022 08:00) (98% - 100%)    Parameters below as of 13 Dec 2022 08:00  Patient On (Oxygen Delivery Method): ventilator    O2 Concentration (%): 40    Mode: AC/ CMV (Assist Control/ Continuous Mandatory Ventilation)  RR (machine): 14  FiO2: 40  PEEP: 5  ITime: 1  MAP: 8  PC: 12  PIP: 26    Physical Exam:   General: Intubated, multiple lines gtt  Neurology: following commands, moving all extremities.  Eyes: bilateral pupils equal and reactive   ENT/Neck: +ETT midline, Neck supple, trachea midline, No JVD   Respiratory: generally clear bilaterally   CV: S1S2, no murmurs  Abdominal: Softly distended, +BS   Extremities: 1+ pedal edema noted, + peripheral pulses palpable, warm, L Fem IABP  Skin: No Rashes, Hematoma, Ecchymosis   -------------------------------------------------------------------------------------------------------------------------------                        9.0    17.35 )-----------( 128      ( 13 Dec 2022 01:08 )             28.7     12-13    135  |  98  |  22  ----------------------------<  124<H>  3.3<L>   |  21<L>  |  1.38<H>    Ca    8.5      13 Dec 2022 01:08  Phos  3.5     12-13  Mg     2.1     12-13    TPro  6.1  /  Alb  2.6<L>  /  TBili  1.2  /  DBili  x   /  AST  39  /  ALT  15  /  AlkPhos  125<H>  12-13    LIVER FUNCTIONS - ( 13 Dec 2022 01:08 )  Alb: 2.6 g/dL / Pro: 6.1 g/dL / ALK PHOS: 125 U/L / ALT: 15 U/L / AST: 39 U/L / GGT: x           PT/INR - ( 13 Dec 2022 01:08 )   PT: 12.9 sec;   INR: 1.12 ratio         PTT - ( 13 Dec 2022 05:37 )  PTT:53.3 sec  ABG - ( 13 Dec 2022 00:00 )  pH, Arterial: 7.41  pH, Blood: x     /  pCO2: 35    /  pO2: 124   / HCO3: 22    / Base Excess: -2.1  /  SaO2: 98.3    ------------------------------------------------------------------------------------------------------------------------------  Assessment:  63 y/o male with PMH of CABG, DM, HTN, HLD presenting as transfer from [place] for c/f STEMI. PTA arrival received 325 aspirin, 600 plavix, and no heparin drip started. Around 1:30 am patient had multiple episodes of non-bloody diarrhea and emesis with associated abdominal pain and chest pain, unable to localize, non-radiating, with associated SOB and no associated palpitations or diaphoresis. No recent fevers/chills, cough, rashes, or changes in urination. Does report mild diffuse back pain; started 5 days ago in setting on injury while walking and lifting objects. Denies focal weakness, numbness/tingling, or LOC due does report mild dizziness.    Acute respiratory distress/failure, intubated    Cardiogenic shock s/p VA ECMO decannulated 12/8  CAD/ASHD/CABG, acute MI  Cardiogenic shock  At high risk for hemodynamic instability  Acute kidney injury  encounter management IABP  encounter weaning ventilator   Anemia  Acute pancreatitis    Plan:   ***Neuro***  CT head showed 4mm subdural hematoma 11/26: repeat CT head unchanged 12/01  Soft palate laceration, ENT scoped 12/8, stable, no acute intervention at this time  Patient sedated w/ low dose Precedex   Acute neuro change - improved today.  Appreciate Neurology input  EEG negative - discontinued.    ***Cardiovascular***  Invasive hemodynamic monitoring, assess perfusion indices   At high risk for hemodynamic instability and cardiac arrhythmias.  Lactate 1.1, continue trending   Continue low dose Milrinone for now.  L Fem IABP 1:1 with good augmentation - plan for weaning trial overnight tonight.   Discussed with Dr. Rodrigues - GRZEGORZ reviewed with structural heart team for mitral clip evaluation.  AC Therapy with Heparin gtt pAF/flutter PTT 44  Amiodarone for rate control  c/w Statin     ***Pulmonary***  Acute respiratory failure  Wean ventilator as tolerated.    ***GI***  Protein calorie malnutrition - Tube feeds via nasal feeding tube.  Pancreatitis   Protonix for stress ulcer prophylaxis   Bowel regimen with Miralax and Senna    ***Renal***  ERIC, renal following, continue CRRT  Volume removal as tolerated.  Replete electrolytes as indicated.    ***ID***  Vancomycin, Meropenem, Diflucan for empiric coverage     Follow up cultures.    ***Endocrine***  Hx of DM2, continue glycemic control w/ ISS.      Patient requires continuous monitoring with bedside rhythm monitoring, pulse oximetry monitoring, and continuous central venous and arterial pressure monitoring; and intermittent blood gas analysis. Care plan discussed with the ICU care team.   Patient remained critical, at risk for life threatening decompensation.    I have spent 45 minutes providing critical care management to this patient.    By signing my name below, I, Rosio Alonzo, attest that this documentation has been prepared under the direction and in the presence of Jade Rosas MD  Electronically signed: Brian Burnett, 12-13-22 @ 08:23    I, Jade Rosas MD, personally performed the services described in this documentation. all medical record entries made by the scribe were at my direction and in my presence. I have reviewed the chart and agree that the record reflects my personal performance and is accurate and complete  Electronically signed: Jade Rosas MD Patient seen and examined at the bedside.    Remained critically ill on continuous ICU monitoring.      Brief Summary:  Cardiogenic shock s/p VA ECMO decannulated 12/8/22. Currently supported with an IABP.    24 Hour events:  - Head CT was unremarkable  - Now awake and following simple commands  - Tolerating pressure support.    OBJECTIVE:  Vital Signs Last 24 Hrs  T(C): 36.8 (13 Dec 2022 08:00), Max: 37.1 (13 Dec 2022 04:00)  T(F): 98.2 (13 Dec 2022 08:00), Max: 98.8 (13 Dec 2022 04:00)  HR: 109 (13 Dec 2022 08:00) (100 - 123)  BP: --  BP(mean): --  RR: 19 (13 Dec 2022 08:00) (14 - 34)  SpO2: 100% (13 Dec 2022 08:00) (98% - 100%)    Parameters below as of 13 Dec 2022 08:00  Patient On (Oxygen Delivery Method): ventilator    O2 Concentration (%): 40    Mode: AC/ CMV (Assist Control/ Continuous Mandatory Ventilation)  RR (machine): 14  FiO2: 40  PEEP: 5  ITime: 1  MAP: 8  PC: 12  PIP: 26    Physical Exam:   General: Intubated, multiple lines gtt  Neurology: following commands, moving all extremities.  Eyes: bilateral pupils equal and reactive   ENT/Neck: +ETT midline, Neck supple, trachea midline, No JVD   Respiratory: generally clear bilaterally   CV: S1S2, no murmurs  Abdominal: Softly distended, +BS   Extremities: 1+ pedal edema noted, + peripheral pulses palpable, warm, L Fem IABP  Skin: No Rashes, Hematoma, Ecchymosis   -------------------------------------------------------------------------------------------------------------------------------                        9.0    17.35 )-----------( 128      ( 13 Dec 2022 01:08 )             28.7     12-13    135  |  98  |  22  ----------------------------<  124<H>  3.3<L>   |  21<L>  |  1.38<H>    Ca    8.5      13 Dec 2022 01:08  Phos  3.5     12-13  Mg     2.1     12-13    TPro  6.1  /  Alb  2.6<L>  /  TBili  1.2  /  DBili  x   /  AST  39  /  ALT  15  /  AlkPhos  125<H>  12-13    LIVER FUNCTIONS - ( 13 Dec 2022 01:08 )  Alb: 2.6 g/dL / Pro: 6.1 g/dL / ALK PHOS: 125 U/L / ALT: 15 U/L / AST: 39 U/L / GGT: x           PT/INR - ( 13 Dec 2022 01:08 )   PT: 12.9 sec;   INR: 1.12 ratio         PTT - ( 13 Dec 2022 05:37 )  PTT:53.3 sec  ABG - ( 13 Dec 2022 00:00 )  pH, Arterial: 7.41  pH, Blood: x     /  pCO2: 35    /  pO2: 124   / HCO3: 22    / Base Excess: -2.1  /  SaO2: 98.3    ------------------------------------------------------------------------------------------------------------------------------  Assessment:  63 y/o male with PMH of CABG, DM, HTN, HLD presenting as transfer from [place] for c/f STEMI. PTA arrival received 325 aspirin, 600 plavix, and no heparin drip started. Around 1:30 am patient had multiple episodes of non-bloody diarrhea and emesis with associated abdominal pain and chest pain, unable to localize, non-radiating, with associated SOB and no associated palpitations or diaphoresis. No recent fevers/chills, cough, rashes, or changes in urination. Does report mild diffuse back pain; started 5 days ago in setting on injury while walking and lifting objects. Denies focal weakness, numbness/tingling, or LOC due does report mild dizziness.    Acute respiratory distress/failure, intubated    Cardiogenic shock s/p VA ECMO decannulated 12/8  CAD/ASHD/CABG, acute MI  Cardiogenic shock  At high risk for hemodynamic instability  Acute kidney injury  encounter management IABP  encounter weaning ventilator   Anemia  Acute pancreatitis    Plan:   ***Neuro***  CT head showed 4mm subdural hematoma 11/26: repeat CT head unchanged 12/01  Soft palate laceration, ENT scoped 12/8, stable, no acute intervention at this time  Patient sedated w/ low dose Precedex   Acute neuro change - improved today.  Appreciate Neurology input  EEG negative - discontinued.    ***Cardiovascular***  Invasive hemodynamic monitoring, assess perfusion indices   At high risk for hemodynamic instability and cardiac arrhythmias.  Lactate 1.1, continue trending   Continue low dose Milrinone for now.  L Fem IABP 1:1 with good augmentation - plan for weaning trial overnight tonight.   Discussed with Dr. Rodrigues - GRZEGORZ reviewed with structural heart team for mitral clip evaluation.  AC Therapy with Heparin gtt pAF/flutter PTT 44  Amiodarone for rate control  c/w Statin     ***Pulmonary***  Acute respiratory failure  Wean ventilator as tolerated.    ***GI***  Protein calorie malnutrition - Tube feeds via nasal feeding tube.  Pancreatitis   Protonix for stress ulcer prophylaxis   Bowel regimen with Miralax and Senna    ***Renal***  ERIC, renal following, continue CRRT  Volume removal as tolerated.  Replete electrolytes as indicated.    ***ID***  Vancomycin, Meropenem, Diflucan for empiric coverage     Follow up cultures.    ***Endocrine***  Hx of DM2, continue glycemic control w/ ISS.      Patient requires continuous monitoring with bedside rhythm monitoring, pulse oximetry monitoring, and continuous central venous and arterial pressure monitoring; and intermittent blood gas analysis. Care plan discussed with the ICU care team.   Patient remained critical, at risk for life threatening decompensation.    I have spent 45 minutes providing critical care management to this patient.    By signing my name below, I, Rosio Alonzo, attest that this documentation has been prepared under the direction and in the presence of Jade Rosas MD  Electronically signed: Brian Burnett, 12-13-22 @ 08:23    I, Jade Rosas MD, personally performed the services described in this documentation. all medical record entries made by the scribe were at my direction and in my presence. I have reviewed the chart and agree that the record reflects my personal performance and is accurate and complete  Electronically signed: Jade Rossa MD Patient seen and examined at the bedside.    Remained critically ill on continuous ICU monitoring.      Brief Summary:  Cardiogenic shock s/p VA ECMO decannulated 12/8/22. Currently supported with an IABP.    24 Hour events:  - Head CT was unremarkable  - Now awake and following simple commands  - Tolerating pressure support.    OBJECTIVE:  Vital Signs Last 24 Hrs  T(C): 36.8 (13 Dec 2022 08:00), Max: 37.1 (13 Dec 2022 04:00)  T(F): 98.2 (13 Dec 2022 08:00), Max: 98.8 (13 Dec 2022 04:00)  HR: 109 (13 Dec 2022 08:00) (100 - 123)  BP: --  BP(mean): --  RR: 19 (13 Dec 2022 08:00) (14 - 34)  SpO2: 100% (13 Dec 2022 08:00) (98% - 100%)    Parameters below as of 13 Dec 2022 08:00  Patient On (Oxygen Delivery Method): ventilator    O2 Concentration (%): 40    Mode: AC/ CMV (Assist Control/ Continuous Mandatory Ventilation)  RR (machine): 14  FiO2: 40  PEEP: 5  ITime: 1  MAP: 8  PC: 12  PIP: 26    Physical Exam:   General: Intubated, multiple lines gtt  Neurology: following commands, moving all extremities.  Eyes: bilateral pupils equal and reactive   ENT/Neck: +ETT midline, Neck supple, trachea midline, No JVD   Respiratory: generally clear bilaterally   CV: S1S2, no murmurs  Abdominal: Softly distended, +BS   Extremities: 1+ pedal edema noted, + peripheral pulses palpable, warm, L Fem IABP  Skin: No Rashes, Hematoma, Ecchymosis   -------------------------------------------------------------------------------------------------------------------------------                        9.0    17.35 )-----------( 128      ( 13 Dec 2022 01:08 )             28.7     12-13    135  |  98  |  22  ----------------------------<  124<H>  3.3<L>   |  21<L>  |  1.38<H>    Ca    8.5      13 Dec 2022 01:08  Phos  3.5     12-13  Mg     2.1     12-13    TPro  6.1  /  Alb  2.6<L>  /  TBili  1.2  /  DBili  x   /  AST  39  /  ALT  15  /  AlkPhos  125<H>  12-13    LIVER FUNCTIONS - ( 13 Dec 2022 01:08 )  Alb: 2.6 g/dL / Pro: 6.1 g/dL / ALK PHOS: 125 U/L / ALT: 15 U/L / AST: 39 U/L / GGT: x           PT/INR - ( 13 Dec 2022 01:08 )   PT: 12.9 sec;   INR: 1.12 ratio         PTT - ( 13 Dec 2022 05:37 )  PTT:53.3 sec  ABG - ( 13 Dec 2022 00:00 )  pH, Arterial: 7.41  pH, Blood: x     /  pCO2: 35    /  pO2: 124   / HCO3: 22    / Base Excess: -2.1  /  SaO2: 98.3    ------------------------------------------------------------------------------------------------------------------------------  Assessment:  63 y/o male with PMH of CABG, DM, HTN, HLD presenting as transfer from [place] for c/f STEMI. PTA arrival received 325 aspirin, 600 plavix, and no heparin drip started. Around 1:30 am patient had multiple episodes of non-bloody diarrhea and emesis with associated abdominal pain and chest pain, unable to localize, non-radiating, with associated SOB and no associated palpitations or diaphoresis. No recent fevers/chills, cough, rashes, or changes in urination. Does report mild diffuse back pain; started 5 days ago in setting on injury while walking and lifting objects. Denies focal weakness, numbness/tingling, or LOC due does report mild dizziness.    Acute respiratory distress/failure, intubated    Cardiogenic shock s/p VA ECMO decannulated 12/8  CAD/ASHD/CABG, acute MI  Cardiogenic shock  At high risk for hemodynamic instability  Acute kidney injury  encounter management IABP  encounter weaning ventilator   Anemia  Acute pancreatitis    Plan:   ***Neuro***  CT head showed 4mm subdural hematoma 11/26: repeat CT head unchanged 12/01  Soft palate laceration, ENT scoped 12/8, stable, no acute intervention at this time  Patient sedated w/ low dose Precedex   Acute neuro change - improved today.  Appreciate Neurology input  EEG negative - discontinued.    ***Cardiovascular***  Invasive hemodynamic monitoring, assess perfusion indices   At high risk for hemodynamic instability and cardiac arrhythmias.  Lactate 1.1, continue trending   Continue low dose Milrinone for now.  L Fem IABP 1:1 with good augmentation - plan for weaning trial overnight tonight.   Discussed with Dr. Rodrigues - GRZEGORZ reviewed with structural heart team for mitral clip evaluation.  AC Therapy with Heparin gtt pAF/flutter PTT 44  Amiodarone for rate control  c/w Statin     ***Pulmonary***  Acute respiratory failure  Wean ventilator as tolerated.    ***GI***  Protein calorie malnutrition - Tube feeds via nasal feeding tube.  Pancreatitis   Protonix for stress ulcer prophylaxis   Bowel regimen with Miralax and Senna    ***Renal***  ERIC, renal following, continue CRRT  Volume removal as tolerated.  Replete electrolytes as indicated.    ***ID***  Vancomycin, Meropenem, Diflucan for empiric coverage     Follow up cultures.    ***Endocrine***  Hx of DM2, continue glycemic control w/ ISS.      Patient requires continuous monitoring with bedside rhythm monitoring, pulse oximetry monitoring, and continuous central venous and arterial pressure monitoring; and intermittent blood gas analysis. Care plan discussed with the ICU care team.   Patient remained critical, at risk for life threatening decompensation.    I have spent 45 minutes providing critical care management to this patient.    By signing my name below, I, Rosio Alonzo, attest that this documentation has been prepared under the direction and in the presence of Jade Rosas MD  Electronically signed: Brian Burnett, 12-13-22 @ 08:23    I, Jade Rosas MD, personally performed the services described in this documentation. all medical record entries made by the scribe were at my direction and in my presence. I have reviewed the chart and agree that the record reflects my personal performance and is accurate and complete  Electronically signed: Jade Rosas MD

## 2022-12-13 NOTE — PROGRESS NOTE ADULT - SUBJECTIVE AND OBJECTIVE BOX
infectious diseases progress note:    Patient is a 62y old  Male who presents with a chief complaint of Acute cholecystitis, gallstone pancreatitis (13 Dec 2022 07:29)        Acute pancreatitis without infection or necrosis           s    Allergies    No Known Allergies    Intolerances        ANTIBIOTICS/RELEVANT:  antimicrobials  fluconAZOLE IVPB      fluconAZOLE IVPB 200 milliGRAM(s) IV Intermittent every 24 hours  meropenem  IVPB 1000 milliGRAM(s) IV Intermittent every 12 hours  vancomycin  IVPB 1000 milliGRAM(s) IV Intermittent every 24 hours    immunologic:    OTHER:  acetaminophen    Suspension .. 650 milliGRAM(s) Enteral Tube every 6 hours PRN  aMIOdarone    Tablet   Oral   aMIOdarone    Tablet 400 milliGRAM(s) Oral every 8 hours  atorvastatin 80 milliGRAM(s) Oral at bedtime  chlorhexidine 0.12% Liquid 15 milliLiter(s) Oral Mucosa every 12 hours  chlorhexidine 2% Cloths 1 Application(s) Topical <User Schedule>  CRRT Treatment    <Continuous>  dexMEDEtomidine Infusion 0.3 MICROgram(s)/kG/Hr IV Continuous <Continuous>  heparin  Infusion 800 Unit(s)/Hr IV Continuous <Continuous>  insulin lispro (ADMELOG) corrective regimen sliding scale   SubCutaneous every 4 hours  milrinone Infusion 0.1 MICROgram(s)/kG/Min IV Continuous <Continuous>  multivitamin/minerals/iron Oral Solution (CENTRUM) 15 milliLiter(s) Oral daily  pantoprazole  Injectable 40 milliGRAM(s) IV Push daily  Phoxillum Filtration BK 4 / 2.5 5000 milliLiter(s) CRRT <Continuous>  polyethylene glycol 3350 17 Gram(s) Oral two times a day  PrismaSATE Dialysate BK 0 / 3.5 5000 milliLiter(s) CRRT <Continuous>  PrismaSOL Filtration BGK 4 / 2.5 5000 milliLiter(s) CRRT <Continuous>  senna 2 Tablet(s) Oral at bedtime  sodium chloride 0.9%. 1000 milliLiter(s) IV Continuous <Continuous>  thiamine 100 milliGRAM(s) Enteral Tube daily      Objective:  Vital Signs Last 24 Hrs  T(C): 36.8 (13 Dec 2022 08:00), Max: 37.1 (13 Dec 2022 04:00)  T(F): 98.2 (13 Dec 2022 08:00), Max: 98.8 (13 Dec 2022 04:00)  HR: 109 (13 Dec 2022 08:00) (100 - 123)  BP: --  BP(mean): --  RR: 19 (13 Dec 2022 08:00) (14 - 34)  SpO2: 100% (13 Dec 2022 08:00) (98% - 100%)    Parameters below as of 13 Dec 2022 08:00  Patient On (Oxygen Delivery Method): ventilator    O2 Concentration (%): 40       Eyes:INOCENCIO, EOMI  Ear/Nose/Throat: no oral lesion, no sinus tenderness on percussion	  Neck:no JVD, no lymphadenopathy, supple  Respiratory: CTA lore  Cardiovascular: S1S2 RRR, no murmurs  Gastrointestinal:soft, (+) BS, no HSM  Extremities:no e/e/c        LABS:                        9.0    17.35 )-----------( 128      ( 13 Dec 2022 01:08 )             28.7     12-13    135  |  98  |  22  ----------------------------<  124<H>  3.3<L>   |  21<L>  |  1.38<H>    Ca    8.5      13 Dec 2022 01:08  Phos  3.5     12-13  Mg     2.1     12-13    TPro  6.1  /  Alb  2.6<L>  /  TBili  1.2  /  DBili  x   /  AST  39  /  ALT  15  /  AlkPhos  125<H>  12-13    PT/INR - ( 13 Dec 2022 01:08 )   PT: 12.9 sec;   INR: 1.12 ratio         PTT - ( 13 Dec 2022 05:37 )  PTT:53.3 sec        MICROBIOLOGY:    RECENT CULTURES:  12-11 @ 00:26 Catheterized Catheterized                No growth    12-10 @ 15:45 .Blood Blood-Peripheral                No growth to date.    12-10 @ 15:30 .Blood Blood-Peripheral                No growth to date.    12-10 @ 10:49 ET Tube ET Tube       Moderate polymorphonuclear leukocytes per low power field  No Squamous epithelial cells per low power field  Few Yeast like cells per oil power field           Normal Respiratory Christy present    12-09 @ 04:58 ET Tube ET Tube       Numerous polymorphonuclear leukocytes per low power field  Moderate Squamous epithelial cells per low power field  Few Yeast like cells  per oil power field           Normal Respiratory Christy present    12-08 @ 23:45 .Blood Blood-Peripheral                No growth to date.    12-08 @ 23:44 .Blood Blood-Peripheral                No growth to date.          RESPIRATORY CULTURES:              RADIOLOGY & ADDITIONAL STUDIES:        Pager [phone]  After 5 pm/weekends or if no response :[phone] infectious diseases progress note:    Patient is a 62y old  Male who presents with a chief complaint of Acute cholecystitis, gallstone pancreatitis (13 Dec 2022 07:29)        Acute pancreatitis without infection or necrosis           s    Allergies    No Known Allergies    Intolerances        ANTIBIOTICS/RELEVANT:  antimicrobials  fluconAZOLE IVPB      fluconAZOLE IVPB 200 milliGRAM(s) IV Intermittent every 24 hours  meropenem  IVPB 1000 milliGRAM(s) IV Intermittent every 12 hours  vancomycin  IVPB 1000 milliGRAM(s) IV Intermittent every 24 hours    immunologic:    OTHER:  acetaminophen    Suspension .. 650 milliGRAM(s) Enteral Tube every 6 hours PRN  aMIOdarone    Tablet   Oral   aMIOdarone    Tablet 400 milliGRAM(s) Oral every 8 hours  atorvastatin 80 milliGRAM(s) Oral at bedtime  chlorhexidine 0.12% Liquid 15 milliLiter(s) Oral Mucosa every 12 hours  chlorhexidine 2% Cloths 1 Application(s) Topical <User Schedule>  CRRT Treatment    <Continuous>  dexMEDEtomidine Infusion 0.3 MICROgram(s)/kG/Hr IV Continuous <Continuous>  heparin  Infusion 800 Unit(s)/Hr IV Continuous <Continuous>  insulin lispro (ADMELOG) corrective regimen sliding scale   SubCutaneous every 4 hours  milrinone Infusion 0.1 MICROgram(s)/kG/Min IV Continuous <Continuous>  multivitamin/minerals/iron Oral Solution (CENTRUM) 15 milliLiter(s) Oral daily  pantoprazole  Injectable 40 milliGRAM(s) IV Push daily  Phoxillum Filtration BK 4 / 2.5 5000 milliLiter(s) CRRT <Continuous>  polyethylene glycol 3350 17 Gram(s) Oral two times a day  PrismaSATE Dialysate BK 0 / 3.5 5000 milliLiter(s) CRRT <Continuous>  PrismaSOL Filtration BGK 4 / 2.5 5000 milliLiter(s) CRRT <Continuous>  senna 2 Tablet(s) Oral at bedtime  sodium chloride 0.9%. 1000 milliLiter(s) IV Continuous <Continuous>  thiamine 100 milliGRAM(s) Enteral Tube daily      Objective:  Vital Signs Last 24 Hrs  T(C): 36.8 (13 Dec 2022 08:00), Max: 37.1 (13 Dec 2022 04:00)  T(F): 98.2 (13 Dec 2022 08:00), Max: 98.8 (13 Dec 2022 04:00)  HR: 109 (13 Dec 2022 08:00) (100 - 123)  BP: --  BP(mean): --  RR: 19 (13 Dec 2022 08:00) (14 - 34)  SpO2: 100% (13 Dec 2022 08:00) (98% - 100%)    Parameters below as of 13 Dec 2022 08:00  Patient On (Oxygen Delivery Method): ventilator    O2 Concentration (%): 40       Eyes:INOCENCIO, EOMI  Ear/Nose/Throat: no oral lesion, no sinus tenderness on percussion	  Neck:no JVD, no lymphadenopathy, supple  Respiratory: CTA lore  Cardiovascular: S1S2 RRR, no murmurs  Gastrointestinal:soft, (+) BS, no HSM  Extremities:no e/e/c        LABS:                        9.0    17.35 )-----------( 128      ( 13 Dec 2022 01:08 )             28.7     12-13    135  |  98  |  22  ----------------------------<  124<H>  3.3<L>   |  21<L>  |  1.38<H>    Ca    8.5      13 Dec 2022 01:08  Phos  3.5     12-13  Mg     2.1     12-13    TPro  6.1  /  Alb  2.6<L>  /  TBili  1.2  /  DBili  x   /  AST  39  /  ALT  15  /  AlkPhos  125<H>  12-13    PT/INR - ( 13 Dec 2022 01:08 )   PT: 12.9 sec;   INR: 1.12 ratio         PTT - ( 13 Dec 2022 05:37 )  PTT:53.3 sec        MICROBIOLOGY:    RECENT CULTURES:  12-11 @ 00:26 Catheterized Catheterized                No growth    12-10 @ 15:45 .Blood Blood-Peripheral                No growth to date.    12-10 @ 15:30 .Blood Blood-Peripheral                No growth to date.    12-10 @ 10:49 ET Tube ET Tube       Moderate polymorphonuclear leukocytes per low power field  No Squamous epithelial cells per low power field  Few Yeast like cells per oil power field           Normal Respiratory Christy present    12-09 @ 04:58 ET Tube ET Tube       Numerous polymorphonuclear leukocytes per low power field  Moderate Squamous epithelial cells per low power field  Few Yeast like cells  per oil power field           Normal Respiratory Christy present    12-08 @ 23:45 .Blood Blood-Peripheral                No growth to date.    12-08 @ 23:44 .Blood Blood-Peripheral                No growth to date.          RESPIRATORY CULTURES:              RADIOLOGY & ADDITIONAL STUDIES:        Pager [phone]  After 5 pm/weekends or if no response :[phone] infectious diseases progress note:    Patient is a 62y old  Male who presents with a chief complaint of Acute cholecystitis, gallstone pancreatitis (13 Dec 2022 07:29)        Acute pancreatitis without infection or necrosis           s    Allergies    No Known Allergies    Intolerances        ANTIBIOTICS/RELEVANT:  antimicrobials  fluconAZOLE IVPB      fluconAZOLE IVPB 200 milliGRAM(s) IV Intermittent every 24 hours  meropenem  IVPB 1000 milliGRAM(s) IV Intermittent every 12 hours  vancomycin  IVPB 1000 milliGRAM(s) IV Intermittent every 24 hours    immunologic:    OTHER:  acetaminophen    Suspension .. 650 milliGRAM(s) Enteral Tube every 6 hours PRN  aMIOdarone    Tablet   Oral   aMIOdarone    Tablet 400 milliGRAM(s) Oral every 8 hours  atorvastatin 80 milliGRAM(s) Oral at bedtime  chlorhexidine 0.12% Liquid 15 milliLiter(s) Oral Mucosa every 12 hours  chlorhexidine 2% Cloths 1 Application(s) Topical <User Schedule>  CRRT Treatment    <Continuous>  dexMEDEtomidine Infusion 0.3 MICROgram(s)/kG/Hr IV Continuous <Continuous>  heparin  Infusion 800 Unit(s)/Hr IV Continuous <Continuous>  insulin lispro (ADMELOG) corrective regimen sliding scale   SubCutaneous every 4 hours  milrinone Infusion 0.1 MICROgram(s)/kG/Min IV Continuous <Continuous>  multivitamin/minerals/iron Oral Solution (CENTRUM) 15 milliLiter(s) Oral daily  pantoprazole  Injectable 40 milliGRAM(s) IV Push daily  Phoxillum Filtration BK 4 / 2.5 5000 milliLiter(s) CRRT <Continuous>  polyethylene glycol 3350 17 Gram(s) Oral two times a day  PrismaSATE Dialysate BK 0 / 3.5 5000 milliLiter(s) CRRT <Continuous>  PrismaSOL Filtration BGK 4 / 2.5 5000 milliLiter(s) CRRT <Continuous>  senna 2 Tablet(s) Oral at bedtime  sodium chloride 0.9%. 1000 milliLiter(s) IV Continuous <Continuous>  thiamine 100 milliGRAM(s) Enteral Tube daily      Objective:  Vital Signs Last 24 Hrs  T(C): 36.8 (13 Dec 2022 08:00), Max: 37.1 (13 Dec 2022 04:00)  T(F): 98.2 (13 Dec 2022 08:00), Max: 98.8 (13 Dec 2022 04:00)  HR: 109 (13 Dec 2022 08:00) (100 - 123)  BP: --  BP(mean): --  RR: 19 (13 Dec 2022 08:00) (14 - 34)  SpO2: 100% (13 Dec 2022 08:00) (98% - 100%)    Parameters below as of 13 Dec 2022 08:00  Patient On (Oxygen Delivery Method): ventilator    O2 Concentration (%): 40       Eyes:INOCENCIO, EOMI  Ear/Nose/Throat: no oral lesion, no sinus tenderness on percussion	  Neck:no JVD, no lymphadenopathy, supple  Respiratory: CTA lore  Cardiovascular: S1S2 RRR, no murmurs  Gastrointestinal:soft, (+) BS, no HSM  Extremities:no e/e/c        LABS:                        9.0    17.35 )-----------( 128      ( 13 Dec 2022 01:08 )             28.7     12-13    135  |  98  |  22  ----------------------------<  124<H>  3.3<L>   |  21<L>  |  1.38<H>    Ca    8.5      13 Dec 2022 01:08  Phos  3.5     12-13  Mg     2.1     12-13    TPro  6.1  /  Alb  2.6<L>  /  TBili  1.2  /  DBili  x   /  AST  39  /  ALT  15  /  AlkPhos  125<H>  12-13    PT/INR - ( 13 Dec 2022 01:08 )   PT: 12.9 sec;   INR: 1.12 ratio         PTT - ( 13 Dec 2022 05:37 )  PTT:53.3 sec        MICROBIOLOGY:    RECENT CULTURES:  12-11 @ 00:26 Catheterized Catheterized                No growth    12-10 @ 15:45 .Blood Blood-Peripheral                No growth to date.    12-10 @ 15:30 .Blood Blood-Peripheral                No growth to date.    12-10 @ 10:49 ET Tube ET Tube       Moderate polymorphonuclear leukocytes per low power field  No Squamous epithelial cells per low power field  Few Yeast like cells per oil power field           Normal Respiratory Christy present    12-09 @ 04:58 ET Tube ET Tube       Numerous polymorphonuclear leukocytes per low power field  Moderate Squamous epithelial cells per low power field  Few Yeast like cells  per oil power field           Normal Respiratory Christy present    12-08 @ 23:45 .Blood Blood-Peripheral                No growth to date.    12-08 @ 23:44 .Blood Blood-Peripheral                No growth to date.          RESPIRATORY CULTURES:              RADIOLOGY & ADDITIONAL STUDIES:        Pager [phone]  After 5 pm/weekends or if no response :[phone]

## 2022-12-13 NOTE — PROGRESS NOTE ADULT - SUBJECTIVE AND OBJECTIVE BOX
NEUROLOGY FOLLOW-UP CONSULT NOTE    RFC: Altered mental status (AMS)    Interval history: No acute neurologic events overnight. Patient much improved today, more alert/attentive, following simple commands, and moving extremities. No focal neurologic deficits or abnormal movements reported.    Meds:  MEDICATIONS  (STANDING):  aMIOdarone    Tablet   Oral   aMIOdarone    Tablet 400 milliGRAM(s) Oral every 8 hours  atorvastatin 80 milliGRAM(s) Oral at bedtime  chlorhexidine 0.12% Liquid 15 milliLiter(s) Oral Mucosa every 12 hours  chlorhexidine 2% Cloths 1 Application(s) Topical <User Schedule>  CRRT Treatment    <Continuous>  dexMEDEtomidine Infusion 0.3 MICROgram(s)/kG/Hr (6.97 mL/Hr) IV Continuous <Continuous>  fluconAZOLE IVPB      fluconAZOLE IVPB 200 milliGRAM(s) IV Intermittent every 24 hours  heparin  Infusion 800 Unit(s)/Hr (16.5 mL/Hr) IV Continuous <Continuous>  insulin lispro (ADMELOG) corrective regimen sliding scale   SubCutaneous every 4 hours  meropenem  IVPB 1000 milliGRAM(s) IV Intermittent every 12 hours  milrinone Infusion 0.1 MICROgram(s)/kG/Min (2.79 mL/Hr) IV Continuous <Continuous>  multivitamin/minerals/iron Oral Solution (CENTRUM) 15 milliLiter(s) Oral daily  pantoprazole  Injectable 40 milliGRAM(s) IV Push daily  Phoxillum Filtration BK 4 / 2.5 5000 milliLiter(s) (800 mL/Hr) CRRT <Continuous>  Phoxillum Filtration BK 4 / 2.5 5000 milliLiter(s) (2200 mL/Hr) CRRT <Continuous>  polyethylene glycol 3350 17 Gram(s) Oral two times a day  PrismaSOL Filtration BGK 4 / 2.5 5000 milliLiter(s) (200 mL/Hr) CRRT <Continuous>  senna 2 Tablet(s) Oral at bedtime  sodium chloride 0.9%. 1000 milliLiter(s) (5 mL/Hr) IV Continuous <Continuous>  thiamine 100 milliGRAM(s) Enteral Tube daily  vancomycin  IVPB 1000 milliGRAM(s) IV Intermittent every 24 hours    MEDICATIONS  (PRN):  acetaminophen    Suspension .. 650 milliGRAM(s) Enteral Tube every 6 hours PRN Temp greater or equal to 38C (100.4F), Mild Pain (1 - 3)  lidocaine   4% Patch 1 Patch Transdermal daily PRN pain    GTT:  Heparin 1650 Units/hour  Milrinone 0.1 mcg/kg/min  Precedex 0.3 mcg/kg/hour    PMHx/PSHx/FHx/SHx:  Acute pancreatitis without infection or necrosis    Handoff    MEWS Score    Cardiac arrest    Personal history of ECMO    Cardiac arrest    Personal history of ECMO    Abdominal pain    Acute pancreatitis    Non-ST elevation MI (NSTEMI)    Gall stone pancreatitis    Type 2 diabetes mellitus    CAD (coronary artery disease)    HTN (hypertension)    HLD (hyperlipidemia)    Prophylactic measure    Acute respiratory failure with hypoxia    Elevated troponin    Medication management    ERIC (acute kidney injury)    Cardiogenic shock    Nontraumatic subdural hematoma    Hypernatremia    Palliative care encounter    Oral bleeding    Supraventricular tachycardia    Fever    Insertion, cannula, percutaneous, adult, for ECMO    Open removal of extracorporeal membrane oxygenation (ECMO) cannula in patient 6 years of age or older    STEMI TX H    Chest pain    NSTEMI (non-ST elevation myocardial infarction)    Acute renal failure    Moderate dehydration    Acute cholecystitis    [email or web address]        Allergies:  No Known Allergies      ROS: Due to clinical condition unable to assess (HYUN)    O:  T(C): 36.7 (12-13-22 @ 12:00), Max: 37.1 (12-13-22 @ 04:00)  HR: 109 (12-13-22 @ 12:45) (100 - 123)  BP: --  RR: 20 (12-13-22 @ 12:00) (9 - 34)  SpO2: 98% (12-13-22 @ 12:45) (97% - 100%)    Focused neurologic exam:  MS - Intubated, sedated, awake, attends to all stimuli b/l, no speech output but can occasionally nod head appropriately to "yes"/"no" questions, usually follows simple axial and appendicular commands. Due to clinical condition unable to assess (HYUN) orientation, rep/naming, attn/conc/recent and remote memory/fund of knowledge  CN - PERRL, EOMI, VFF, face sens/str WNL b/l, hearing intact to conversation b/l, SCM at least 4+/5 b/l and symmetric, (+) spontaneous respirations (patient rate 23 bpm, vent rate 14 bpm), (+) cough, tongue midline. HYUN palate  Motor - Normal bulk and mildly dec tone throughout. Spontaneous movement of BUE's > BLE's. BUE's 2+/5 and BLE's 2/5  Sens - LT/temp intact all  DTR's - 2+ all except for tr AJ b/l and neutral b/l plantar response  Coord - Grossly appropriate for level of strength  Gait and station - HYUN    Pertinent labs/studies:  CBC with inc WBC 17.35, low H/H 9/29 and MCV WNL, low plt 128  PTT inc 57.6, PT/INR WNL  BMP with low K 3.3, BUN/Cr inc 22/1.38 (GFR dec 58), otherwise essentially WNL  Mg WNL, Phos WNL  Albumin dec 2.6, LFT's with inc alk phos 125  TSH/T4 WNL      CT head w/o 12/13 - Similar minimal increased density along the right aspect of the posterior falx and right tentorial leaf, consistent with minimal residual subdural hemorrhage.    No parenchymal hemorrhage or brain edema.      vEEG 12/13 -  EEG Classification / Summary:  Abnormal  EEG in the awake / drowsy / asleep state(s).  Background slowing, generalized, mild     Clinical Impression:  Mild diffuse / multifocal cerebral dysfunction.   There were no epileptiform abnormalities recorded.      TTE 12/10 -  1. Mitral annular calcification, otherwise normal mitral  valve. Mild-moderate mitral regurgitation.  2. Normal left ventricular internal dimensions and wall  thickness.  3. Severe global left ventricular systolic dysfunction.  Endocardial visualization enhanced with intravenous  injection of Ultrasonic Enhancing Agent (Definity).  No LV  thrombus seen.  4. The right ventricle is not well visualized.    EKG 12/10 -  SINUS TACHYCARDIA (114 bpm)  INFERIOR INFARCT (CITED ON OR BEFORE 26-NOV-2022)  MARKED ST ABNORMALITY, POSSIBLE LATERAL SUBENDOCARDIAL INJURY  ** ** ACUTE MI / STEMI ** **  Consider right ventricular involvement in acute inferior infarct  ABNORMAL ECG  WHEN COMPARED WITH ECG OF 09-DEC-2022 15:08

## 2022-12-13 NOTE — PROGRESS NOTE ADULT - SUBJECTIVE AND OBJECTIVE BOX
Patient seen and examined at the bedside.    Remained critically ill on continuous ICU monitoring.    OBJECTIVE:  Vital Signs Last 24 Hrs  T(C): 37.1 (13 Dec 2022 20:00), Max: 37.1 (13 Dec 2022 04:00)  T(F): 98.8 (13 Dec 2022 20:00), Max: 98.8 (13 Dec 2022 04:00)  HR: 102 (13 Dec 2022 20:00) (100 - 122)  BP: --  BP(mean): --  RR: 14 (13 Dec 2022 20:00) (9 - 34)  SpO2: 100% (13 Dec 2022 20:00) (97% - 100%)    Parameters below as of 13 Dec 2022 20:00  Patient On (Oxygen Delivery Method): ventilator    O2 Concentration (%): 40      Physical Exam:   General: Intubated, multiple lines gtt  Neurology: on/off sedation  Eyes: bilateral pupils equal and reactive   ENT/Neck: +ETT midline, Neck supple, trachea midline, No JVD   Respiratory: rhonchi bilaterally   CV: S1S2, no murmurs        [x] Sinus Tachycardia  Abdominal: Softly distended,+BS   Extremities: 1+ pedal edema noted, + peripheral pulses palpable, warm  Skin: No Rashes, Hematoma, Ecchymosis           Assessment:  63 y/o male with PMH of CABG, DM, HTN, HLD presenting as transfer from [place] for c/f STEMI. PTA arrival received 325 aspirin, 600 plavix, and no heparin drip started. Around 1:30 am patient had multiple episodes of non-bloody diarrhea and emesis with associated abdominal pain and chest pain, unable to localize, non-radiating, with associated SOB and no associated palpitations or diaphoresis. No recent fevers/chills, cough, rashes, or changes in urination. Does report mild diffuse back pain; started 5 days ago in setting on injury while walking and lifting objects. Denies focal weakness, numbness/tingling, or LOC due does report mild dizziness.    acute respiratory distress/failure, intubated ,   cardiogenic shock s/p [facility] ECMO decannulated 12/8  CAD/ASHD/CABG, acute MI  cardiogenic shock  Hemodynamic instability  acute renal failure  encounter management IABP  encounter weaning ventilator  Leukocytosis   Thrombocytopenia  Anemia  acute pancreatitis        Plan:   ***Neuro***  CT head showed 4mm subdural hematoma 11/26: repeat CT head unchanged 12/01,  [x] Sedated with [x] Precedex for vent synchrony  Post operative neuro assessment   Soft palate laceration, ENT scoped 12/8, stable, no acute intervention at this time      ***Cardiovascular***  12/8 TTE EF 20-25%, Severe global left ventricular systolic dysfunction.  12/8 GRZEGORZ EF 40-45%, MR, TR, normal RV function  12/7 TTE turndown EF 30-40%, normal RV, MR mod   Invasive hemodynamic monitoring, assess perfusion indices   ST / CVP 10/ MAP 71/ Hct 28.7/ Lactate 0.9  [x] Primacor- 0.1 mcgs/kg/min   [x] 12/7 cath patent LIMA graft and other grafts occluded, femoral IABP placed, with good augmentation @ 1:1,  [x] 12/8 ECMO d/c'd  PO Amiodarone for afib prophylaxis   Continuos reassessment of hemodynamics   12/8 Aflutter s/p DCCV x2  [x] AC Therapy with Heparin gtt PTT 44  [x] Statin   11/29 HIT NG, 12/8 MARY Neg    ***Pulmonary***  Post op vent management   Titration of FiO2 and PEEP, follow SpO2, CXR, blood gasses   CPAP trials as tolerated    Mode: AC/ CMV (Assist Control/ Continuous Mandatory Ventilation)  RR (machine): 14  FiO2: 40  PEEP: 5  ITime: 1  MAP: 9  PC: 12  PIP: 16              ***GI***  [x] NPO with TF's Glucerna 1.5 @ goal of 60cc/hr   [x] Protonix    Bowel regimen with Miralax and senna      ***Renal***  ERIC, renal following CVVHD started 12/5  Continue to monitor I/Os, BUN/Creatinine.   Replete lytes PRN  De Dios present    ***ID***  Empiric coverage with Vancomycin and Meropenem   Diflucan for prophiolaxia   Plan to CT to rule out infection       ***Endocrine***  [x]  DM2: HbA1c 7.3%                - [x] ISS             - Need tight glycemic control to prevent wound infection.            Patient requires continuous monitoring with bedside rhythm monitoring, pulse oximetry monitoring, and continuous central venous and arterial pressure monitoring; and intermittent blood gas analysis. Care plan discussed with the ICU care team.   Patient remained critical, at risk for life threatening decompensation.    I have spent 30 minutes providing critical care management to this patient.    By signing my name below, I, Abhijit Ngo, attest that this documentation has been prepared under the direction and in the presence of BROOKE Falcon   Electronically signed: Brian Slaughter, 12-13-22 @ 20:38    I, BROOKE Falcon, personally performed the services described in this documentation. all medical record entries made by the scribe were at my direction and in my presence. I have reviewed the chart and agree that the record reflects my personal performance and is accurate and complete  Electronically signed: BROKOE Falcon  Patient seen and examined at the bedside.    Remained critically ill on continuous ICU monitoring.    OBJECTIVE:  Vital Signs Last 24 Hrs  T(C): 37.1 (13 Dec 2022 20:00), Max: 37.1 (13 Dec 2022 04:00)  T(F): 98.8 (13 Dec 2022 20:00), Max: 98.8 (13 Dec 2022 04:00)  HR: 102 (13 Dec 2022 20:00) (100 - 122)  BP: --  BP(mean): --  RR: 14 (13 Dec 2022 20:00) (9 - 34)  SpO2: 100% (13 Dec 2022 20:00) (97% - 100%)    Parameters below as of 13 Dec 2022 20:00  Patient On (Oxygen Delivery Method): ventilator    O2 Concentration (%): 40      Physical Exam:   General: Intubated, multiple lines gtt  Neurology: on/off sedation  Eyes: bilateral pupils equal and reactive   ENT/Neck: +ETT midline, Neck supple, trachea midline, No JVD   Respiratory: rhonchi bilaterally   CV: S1S2, no murmurs        [x] Sinus Tachycardia  Abdominal: Softly distended,+BS   Extremities: 1+ pedal edema noted, + peripheral pulses palpable, warm  Skin: No Rashes, Hematoma, Ecchymosis           Assessment:  61 y/o male with PMH of CABG, DM, HTN, HLD presenting as transfer from [place] for c/f STEMI. PTA arrival received 325 aspirin, 600 plavix, and no heparin drip started. Around 1:30 am patient had multiple episodes of non-bloody diarrhea and emesis with associated abdominal pain and chest pain, unable to localize, non-radiating, with associated SOB and no associated palpitations or diaphoresis. No recent fevers/chills, cough, rashes, or changes in urination. Does report mild diffuse back pain; started 5 days ago in setting on injury while walking and lifting objects. Denies focal weakness, numbness/tingling, or LOC due does report mild dizziness.    acute respiratory distress/failure, intubated ,   cardiogenic shock s/p VA ECMO decannulated 12/8  CAD/ASHD/CABG, acute MI  cardiogenic shock  Hemodynamic instability  acute renal failure  encounter management IABP  encounter weaning ventilator  Leukocytosis   Thrombocytopenia  Anemia  acute pancreatitis        Plan:   ***Neuro***  CT head showed 4mm subdural hematoma 11/26: repeat CT head unchanged 12/01,  [x] Sedated with [x] Precedex for vent synchrony  Post operative neuro assessment   Soft palate laceration, ENT scoped 12/8, stable, no acute intervention at this time      ***Cardiovascular***  12/8 TTE EF 20-25%, Severe global left ventricular systolic dysfunction.  12/8 GRZEGORZ EF 40-45%, MR, TR, normal RV function  12/7 TTE turndown EF 30-40%, normal RV, MR mod   Invasive hemodynamic monitoring, assess perfusion indices   ST / CVP 10/ MAP 71/ Hct 28.7/ Lactate 0.9  [x] Primacor- 0.1 mcgs/kg/min   [x] 12/7 cath patent LIMA graft and other grafts occluded, femoral IABP placed, with good augmentation @ 1:1,  [x] 12/8 ECMO d/c'd  PO Amiodarone for afib prophylaxis   Continuos reassessment of hemodynamics   12/8 Aflutter s/p DCCV x2  [x] AC Therapy with Heparin gtt PTT 44  [x] Statin   11/29 HIT NG, 12/8 MARY Neg    ***Pulmonary***  Post op vent management   Titration of FiO2 and PEEP, follow SpO2, CXR, blood gasses   CPAP trials as tolerated    Mode: AC/ CMV (Assist Control/ Continuous Mandatory Ventilation)  RR (machine): 14  FiO2: 40  PEEP: 5  ITime: 1  MAP: 9  PC: 12  PIP: 16              ***GI***  [x] NPO with TF's Glucerna 1.5 @ goal of 60cc/hr   [x] Protonix    Bowel regimen with Miralax and senna      ***Renal***  ERIC, renal following CVVHD started 12/5  Continue to monitor I/Os, BUN/Creatinine.   Replete lytes PRN  De Dios present    ***ID***  Empiric coverage with Vancomycin and Meropenem   Diflucan for prophiolaxia   Plan to CT to rule out infection       ***Endocrine***  [x]  DM2: HbA1c 7.3%                - [x] ISS             - Need tight glycemic control to prevent wound infection.            Patient requires continuous monitoring with bedside rhythm monitoring, pulse oximetry monitoring, and continuous central venous and arterial pressure monitoring; and intermittent blood gas analysis. Care plan discussed with the ICU care team.   Patient remained critical, at risk for life threatening decompensation.    I have spent 30 minutes providing critical care management to this patient.    By signing my name below, I, Abhijit Ngo, attest that this documentation has been prepared under the direction and in the presence of BROOKE Falcon   Electronically signed: Brian Slaughter, 12-13-22 @ 20:38    I, BROOKE Falcon, personally performed the services described in this documentation. all medical record entries made by the scribe were at my direction and in my presence. I have reviewed the chart and agree that the record reflects my personal performance and is accurate and complete  Electronically signed: BROOKE Falcon  Patient seen and examined at the bedside.    Remained critically ill on continuous ICU monitoring.    OBJECTIVE:  Vital Signs Last 24 Hrs  T(C): 37.1 (13 Dec 2022 20:00), Max: 37.1 (13 Dec 2022 04:00)  T(F): 98.8 (13 Dec 2022 20:00), Max: 98.8 (13 Dec 2022 04:00)  HR: 102 (13 Dec 2022 20:00) (100 - 122)  BP: --  BP(mean): --  RR: 14 (13 Dec 2022 20:00) (9 - 34)  SpO2: 100% (13 Dec 2022 20:00) (97% - 100%)    Parameters below as of 13 Dec 2022 20:00  Patient On (Oxygen Delivery Method): ventilator    O2 Concentration (%): 40      Physical Exam:   General: Intubated, multiple lines gtt  Neurology: on/off sedation  Eyes: bilateral pupils equal and reactive   ENT/Neck: +ETT midline, Neck supple, trachea midline, No JVD   Respiratory: rhonchi bilaterally   CV: S1S2, no murmurs        [x] Sinus Tachycardia  Abdominal: Softly distended,+BS   Extremities: 1+ pedal edema noted, + peripheral pulses palpable, warm  Skin: No Rashes, Hematoma, Ecchymosis           Assessment:  61 y/o male with PMH of CABG, DM, HTN, HLD presenting as transfer from [place] for c/f STEMI. PTA arrival received 325 aspirin, 600 plavix, and no heparin drip started. Around 1:30 am patient had multiple episodes of non-bloody diarrhea and emesis with associated abdominal pain and chest pain, unable to localize, non-radiating, with associated SOB and no associated palpitations or diaphoresis. No recent fevers/chills, cough, rashes, or changes in urination. Does report mild diffuse back pain; started 5 days ago in setting on injury while walking and lifting objects. Denies focal weakness, numbness/tingling, or LOC due does report mild dizziness.    acute respiratory distress/failure, intubated ,   cardiogenic shock s/p VA ECMO decannulated 12/8  CAD/ASHD/CABG, acute MI  cardiogenic shock  Hemodynamic instability  acute renal failure  encounter management IABP  encounter weaning ventilator  Leukocytosis   Thrombocytopenia  Anemia  acute pancreatitis        Plan:   ***Neuro***  CT head showed 4mm subdural hematoma 11/26: repeat CT head unchanged 12/01,  [x] Sedated with [x] Precedex for vent synchrony  Post operative neuro assessment   Soft palate laceration, ENT scoped 12/8, stable, no acute intervention at this time      ***Cardiovascular***  12/8 TTE EF 20-25%, Severe global left ventricular systolic dysfunction.  12/8 GRZEGORZ EF 40-45%, MR, TR, normal RV function  12/7 TTE turndown EF 30-40%, normal RV, MR mod   Invasive hemodynamic monitoring, assess perfusion indices   ST / CVP 10/ MAP 71/ Hct 28.7/ Lactate 0.9  [x] Primacor- 0.1 mcgs/kg/min   [x] 12/7 cath patent LIMA graft and other grafts occluded, femoral IABP placed, with good augmentation @ 1:1,  [x] 12/8 ECMO d/c'd  PO Amiodarone for afib prophylaxis   Continuos reassessment of hemodynamics   12/8 Aflutter s/p DCCV x2  [x] AC Therapy with Heparin gtt PTT 44  [x] Statin   11/29 HIT NG, 12/8 MARY Neg    ***Pulmonary***  Post op vent management   Titration of FiO2 and PEEP, follow SpO2, CXR, blood gasses   CPAP trials as tolerated    Mode: AC/ CMV (Assist Control/ Continuous Mandatory Ventilation)  RR (machine): 14  FiO2: 40  PEEP: 5  ITime: 1  MAP: 9  PC: 12  PIP: 16              ***GI***  [x] NPO with TF's Glucerna 1.5 @ goal of 60cc/hr   [x] Protonix    Bowel regimen with Miralax and senna      ***Renal***  ERIC, renal following CVVHD started 12/5  Continue to monitor I/Os, BUN/Creatinine.   Replete lytes PRN  De Dios present    ***ID***  Empiric coverage with Vancomycin and Meropenem   Diflucan for prophiolaxia   Plan to CT to rule out infection       ***Endocrine***  [x]  DM2: HbA1c 7.3%                - [x] ISS             - Need tight glycemic control to prevent wound infection.            Patient requires continuous monitoring with bedside rhythm monitoring, pulse oximetry monitoring, and continuous central venous and arterial pressure monitoring; and intermittent blood gas analysis. Care plan discussed with the ICU care team.   Patient remained critical, at risk for life threatening decompensation.    I have spent 30 minutes providing critical care management to this patient.    By signing my name below, I, Abhijit Ngo, attest that this documentation has been prepared under the direction and in the presence of BROOKE Falcon   Electronically signed: Brian Slaughter, 12-13-22 @ 20:38    I, BROOKE aFlcon, personally performed the services described in this documentation. all medical record entries made by the scribe were at my direction and in my presence. I have reviewed the chart and agree that the record reflects my personal performance and is accurate and complete  Electronically signed: BROOKE Falcon  Patient seen and examined at the bedside.    Remained critically ill on continuous ICU monitoring.  Pt on CVVHD removing 125/hr, IABP on 1:1 with good augmentation, PS weaning today for several hours, tolerated well.  Plan to wean IABP overnight, with consideration of IABP removal in AM.    OBJECTIVE:  Vital Signs Last 24 Hrs  T(C): 37.1 (13 Dec 2022 20:00), Max: 37.1 (13 Dec 2022 04:00)  T(F): 98.8 (13 Dec 2022 20:00), Max: 98.8 (13 Dec 2022 04:00)  HR: 102 (13 Dec 2022 20:00) (100 - 122)  BP: --  BP(mean): --  RR: 14 (13 Dec 2022 20:00) (9 - 34)  SpO2: 100% (13 Dec 2022 20:00) (97% - 100%)    Parameters below as of 13 Dec 2022 20:00  Patient On (Oxygen Delivery Method): ventilator    O2 Concentration (%): 40      Physical Exam:   General: Intubated, multiple lines gtt  Neurology: on/off sedation  Eyes: bilateral pupils equal and reactive   ENT/Neck: +ETT midline, Neck supple, trachea midline, No JVD   Respiratory: rhonchi bilaterally   CV: S1S2, no murmurs        [x] Sinus Tachycardia  Abdominal: Softly distended,+BS   Extremities: 1+ pedal edema noted, + peripheral pulses palpable, warm  Skin: No Rashes, Hematoma, Ecchymosis           Assessment:  61 y/o male with PMH of CABG, DM, HTN, HLD presenting as transfer from [place] for c/f STEMI. PTA arrival received 325 aspirin, 600 plavix, and no heparin drip started. Around 1:30 am patient had multiple episodes of non-bloody diarrhea and emesis with associated abdominal pain and chest pain, unable to localize, non-radiating, with associated SOB and no associated palpitations or diaphoresis. No recent fevers/chills, cough, rashes, or changes in urination. Does report mild diffuse back pain; started 5 days ago in setting on injury while walking and lifting objects. Denies focal weakness, numbness/tingling, or LOC due does report mild dizziness.    acute respiratory distress/failure, intubated ,   cardiogenic shock s/p VA ECMO decannulated 12/8  CAD/ASHD/CABG, acute MI  cardiogenic shock  Hemodynamic instability  acute renal failure  encounter management IABP  encounter weaning ventilator  Leukocytosis   Thrombocytopenia  Anemia  acute pancreatitis        Plan:   ***Neuro***  CT head showed 4mm subdural hematoma 11/26: repeat CT head unchanged 12/01,  [x] Sedated with [x] Precedex for vent synchrony  Post operative neuro assessment   Soft palate laceration, ENT scoped 12/8, stable, no acute intervention at this time  Repeat Head CT 12/13 Similar minimal increased density along the right aspect of the posterior   falx and right tentorial leaf, consistent with minimal residual subdural   hemorrhage.    No parenchymal hemorrhage or brain edema.      ***Cardiovascular***  12/8 TTE EF 20-25%, Severe global left ventricular systolic dysfunction.  12/8 GRZEGORZ EF 40-45%, MR, TR, normal RV function  12/7 TTE turndown EF 30-40%, normal RV, MR mod   Invasive hemodynamic monitoring, assess perfusion indices   ST / CVP 10/ MAP 71/ Hct 28.7/ Lactate 0.9  [x] Primacor- 0.1 mcgs/kg/min   [x] 12/7 cath patent LIMA graft and other grafts occluded, femoral IABP placed, with good augmentation @ 1:1,  [x] 12/8 ECMO d/c'd  PO Amiodarone for afib prophylaxis   Continuos reassessment of hemodynamics   12/8 Aflutter s/p DCCV x2  [x] AC Therapy with Heparin gtt PTT 44  [x] Statin   11/29 HIT NG, 12/8 MARY Neg  Remains on primacor, will continue until after IABP wean    ***Pulmonary***  Post op vent management   Titration of FiO2 and PEEP, follow SpO2, CXR, blood gasses   CPAP trials as tolerated    Mode: AC/ CMV (Assist Control/ Continuous Mandatory Ventilation)  RR (machine): 14  FiO2: 40  PEEP: 5  ITime: 1  MAP: 9  PC: 12  PIP: 16              ***GI***  [x] NPO with TF's Glucerna 1.5 @ goal of 60cc/hr   [x] Protonix    Bowel regimen with Miralax and senna      ***Renal***  ERIC, renal following CVVHD started 12/5  Continue to monitor I/Os, BUN/Creatinine.   Replete lytes PRN  De Dios present    ***ID***  Empiric coverage with Vancomycin and Meropenem   Diflucan for prophiolaxia   Plan to CT to rule out infection       ***Endocrine***  [x]  DM2: HbA1c 7.3%                - [x] ISS             - Need tight glycemic control to prevent wound infection.            Patient requires continuous monitoring with bedside rhythm monitoring, pulse oximetry monitoring, and continuous central venous and arterial pressure monitoring; and intermittent blood gas analysis. Care plan discussed with the ICU care team.   Patient remained critical, at risk for life threatening decompensation.    I have spent 30 minutes providing critical care management to this patient.    By signing my name below, I, Abhijit Ngo, attest that this documentation has been prepared under the direction and in the presence of BROOKE Falcon   Electronically signed: Brian Slaughter, 12-13-22 @ 20:38    I, BROOKE Falcon, personally performed the services described in this documentation. all medical record entries made by the karlaibkyle were at my direction and in my presence. I have reviewed the chart and agree that the record reflects my personal performance and is accurate and complete  Electronically signed: BROOKE Falcon  Patient seen and examined at the bedside.    Remained critically ill on continuous ICU monitoring.  Pt on CVVHD removing 125/hr, IABP on 1:1 with good augmentation, PS weaning today for several hours, tolerated well.  Plan to wean IABP overnight, with consideration of IABP removal in AM.    OBJECTIVE:  Vital Signs Last 24 Hrs  T(C): 37.1 (13 Dec 2022 20:00), Max: 37.1 (13 Dec 2022 04:00)  T(F): 98.8 (13 Dec 2022 20:00), Max: 98.8 (13 Dec 2022 04:00)  HR: 102 (13 Dec 2022 20:00) (100 - 122)  BP: --  BP(mean): --  RR: 14 (13 Dec 2022 20:00) (9 - 34)  SpO2: 100% (13 Dec 2022 20:00) (97% - 100%)    Parameters below as of 13 Dec 2022 20:00  Patient On (Oxygen Delivery Method): ventilator    O2 Concentration (%): 40      Physical Exam:   General: Intubated, multiple lines gtt  Neurology: on/off sedation  Eyes: bilateral pupils equal and reactive   ENT/Neck: +ETT midline, Neck supple, trachea midline, No JVD   Respiratory: rhonchi bilaterally   CV: S1S2, no murmurs        [x] Sinus Tachycardia  Abdominal: Softly distended,+BS   Extremities: 1+ pedal edema noted, + peripheral pulses palpable, warm  Skin: No Rashes, Hematoma, Ecchymosis           Assessment:  63 y/o male with PMH of CABG, DM, HTN, HLD presenting as transfer from [place] for c/f STEMI. PTA arrival received 325 aspirin, 600 plavix, and no heparin drip started. Around 1:30 am patient had multiple episodes of non-bloody diarrhea and emesis with associated abdominal pain and chest pain, unable to localize, non-radiating, with associated SOB and no associated palpitations or diaphoresis. No recent fevers/chills, cough, rashes, or changes in urination. Does report mild diffuse back pain; started 5 days ago in setting on injury while walking and lifting objects. Denies focal weakness, numbness/tingling, or LOC due does report mild dizziness.    acute respiratory distress/failure, intubated ,   cardiogenic shock s/p VA ECMO decannulated 12/8  CAD/ASHD/CABG, acute MI  cardiogenic shock  Hemodynamic instability  acute renal failure  encounter management IABP  encounter weaning ventilator  Leukocytosis   Thrombocytopenia  Anemia  acute pancreatitis        Plan:   ***Neuro***  CT head showed 4mm subdural hematoma 11/26: repeat CT head unchanged 12/01,  [x] Sedated with [x] Precedex for vent synchrony  Post operative neuro assessment   Soft palate laceration, ENT scoped 12/8, stable, no acute intervention at this time  Repeat Head CT 12/13 Similar minimal increased density along the right aspect of the posterior   falx and right tentorial leaf, consistent with minimal residual subdural   hemorrhage.    No parenchymal hemorrhage or brain edema.      ***Cardiovascular***  12/8 TTE EF 20-25%, Severe global left ventricular systolic dysfunction.  12/8 GRZEGORZ EF 40-45%, MR, TR, normal RV function  12/7 TTE turndown EF 30-40%, normal RV, MR mod   Invasive hemodynamic monitoring, assess perfusion indices   ST / CVP 10/ MAP 71/ Hct 28.7/ Lactate 0.9  [x] Primacor- 0.1 mcgs/kg/min   [x] 12/7 cath patent LIMA graft and other grafts occluded, femoral IABP placed, with good augmentation @ 1:1,  [x] 12/8 ECMO d/c'd  PO Amiodarone for afib prophylaxis   Continuos reassessment of hemodynamics   12/8 Aflutter s/p DCCV x2  [x] AC Therapy with Heparin gtt PTT 44  [x] Statin   11/29 HIT NG, 12/8 MARY Neg  Remains on primacor, will continue until after IABP wean    ***Pulmonary***  Post op vent management   Titration of FiO2 and PEEP, follow SpO2, CXR, blood gasses   CPAP trials as tolerated    Mode: AC/ CMV (Assist Control/ Continuous Mandatory Ventilation)  RR (machine): 14  FiO2: 40  PEEP: 5  ITime: 1  MAP: 9  PC: 12  PIP: 16              ***GI***  [x] NPO with TF's Glucerna 1.5 @ goal of 60cc/hr   [x] Protonix    Bowel regimen with Miralax and senna      ***Renal***  ERIC, renal following CVVHD started 12/5  Continue to monitor I/Os, BUN/Creatinine.   Replete lytes PRN  D Edios present    ***ID***  Empiric coverage with Vancomycin and Meropenem   Diflucan for prophiolaxia   Plan to CT to rule out infection       ***Endocrine***  [x]  DM2: HbA1c 7.3%                - [x] ISS             - Need tight glycemic control to prevent wound infection.            Patient requires continuous monitoring with bedside rhythm monitoring, pulse oximetry monitoring, and continuous central venous and arterial pressure monitoring; and intermittent blood gas analysis. Care plan discussed with the ICU care team.   Patient remained critical, at risk for life threatening decompensation.    I have spent 30 minutes providing critical care management to this patient.    By signing my name below, I, Abhijit Ngo, attest that this documentation has been prepared under the direction and in the presence of BROOKE Falcon   Electronically signed: Brian Slaughter, 12-13-22 @ 20:38    I, BROOKE Falcon, personally performed the services described in this documentation. all medical record entries made by the karlaibkyle were at my direction and in my presence. I have reviewed the chart and agree that the record reflects my personal performance and is accurate and complete  Electronically signed: BROOKE Falcon  Patient seen and examined at the bedside.    Remained critically ill on continuous ICU monitoring.  Pt on CVVHD removing 125/hr, IABP on 1:1 with good augmentation, PS weaning today for several hours, tolerated well.  Plan to wean IABP overnight, with consideration of IABP removal in AM.    OBJECTIVE:  Vital Signs Last 24 Hrs  T(C): 37.1 (13 Dec 2022 20:00), Max: 37.1 (13 Dec 2022 04:00)  T(F): 98.8 (13 Dec 2022 20:00), Max: 98.8 (13 Dec 2022 04:00)  HR: 102 (13 Dec 2022 20:00) (100 - 122)  BP: --  BP(mean): --  RR: 14 (13 Dec 2022 20:00) (9 - 34)  SpO2: 100% (13 Dec 2022 20:00) (97% - 100%)    Parameters below as of 13 Dec 2022 20:00  Patient On (Oxygen Delivery Method): ventilator    O2 Concentration (%): 40      Physical Exam:   General: Intubated, multiple lines gtt  Neurology: on/off sedation  Eyes: bilateral pupils equal and reactive   ENT/Neck: +ETT midline, Neck supple, trachea midline, No JVD   Respiratory: rhonchi bilaterally   CV: S1S2, no murmurs        [x] Sinus Tachycardia  Abdominal: Softly distended,+BS   Extremities: 1+ pedal edema noted, + peripheral pulses palpable, warm  Skin: No Rashes, Hematoma, Ecchymosis           Assessment:  61 y/o male with PMH of CABG, DM, HTN, HLD presenting as transfer from [place] for c/f STEMI. PTA arrival received 325 aspirin, 600 plavix, and no heparin drip started. Around 1:30 am patient had multiple episodes of non-bloody diarrhea and emesis with associated abdominal pain and chest pain, unable to localize, non-radiating, with associated SOB and no associated palpitations or diaphoresis. No recent fevers/chills, cough, rashes, or changes in urination. Does report mild diffuse back pain; started 5 days ago in setting on injury while walking and lifting objects. Denies focal weakness, numbness/tingling, or LOC due does report mild dizziness.    acute respiratory distress/failure, intubated ,   cardiogenic shock s/p VA ECMO decannulated 12/8  CAD/ASHD/CABG, acute MI  cardiogenic shock  Hemodynamic instability  acute renal failure  encounter management IABP  encounter weaning ventilator  Leukocytosis   Thrombocytopenia  Anemia  acute pancreatitis        Plan:   ***Neuro***  CT head showed 4mm subdural hematoma 11/26: repeat CT head unchanged 12/01,  [x] Sedated with [x] Precedex for vent synchrony  Post operative neuro assessment   Soft palate laceration, ENT scoped 12/8, stable, no acute intervention at this time  Repeat Head CT 12/13 Similar minimal increased density along the right aspect of the posterior   falx and right tentorial leaf, consistent with minimal residual subdural   hemorrhage.    No parenchymal hemorrhage or brain edema.      ***Cardiovascular***  12/8 TTE EF 20-25%, Severe global left ventricular systolic dysfunction.  12/8 GRZEGORZ EF 40-45%, MR, TR, normal RV function  12/7 TTE turndown EF 30-40%, normal RV, MR mod   Invasive hemodynamic monitoring, assess perfusion indices   ST / CVP 10/ MAP 71/ Hct 28.7/ Lactate 0.9  [x] Primacor- 0.1 mcgs/kg/min   [x] 12/7 cath patent LIMA graft and other grafts occluded, femoral IABP placed, with good augmentation @ 1:1,  [x] 12/8 ECMO d/c'd  PO Amiodarone for afib prophylaxis   Continuos reassessment of hemodynamics   12/8 Aflutter s/p DCCV x2  [x] AC Therapy with Heparin gtt PTT 44  [x] Statin   11/29 HIT NG, 12/8 MARY Neg  Remains on primacor, will continue until after IABP wean    ***Pulmonary***  Post op vent management   Titration of FiO2 and PEEP, follow SpO2, CXR, blood gasses   CPAP trials as tolerated    Mode: AC/ CMV (Assist Control/ Continuous Mandatory Ventilation)  RR (machine): 14  FiO2: 40  PEEP: 5  ITime: 1  MAP: 9  PC: 12  PIP: 16              ***GI***  [x] NPO with TF's Glucerna 1.5 @ goal of 60cc/hr   [x] Protonix    Bowel regimen with Miralax and senna      ***Renal***  ERIC, renal following CVVHD started 12/5  Continue to monitor I/Os, BUN/Creatinine.   Replete lytes PRN  De Dios present    ***ID***  Empiric coverage with Vancomycin and Meropenem   Diflucan for prophiolaxia   Plan to CT to rule out infection       ***Endocrine***  [x]  DM2: HbA1c 7.3%                - [x] ISS             - Need tight glycemic control to prevent wound infection.            Patient requires continuous monitoring with bedside rhythm monitoring, pulse oximetry monitoring, and continuous central venous and arterial pressure monitoring; and intermittent blood gas analysis. Care plan discussed with the ICU care team.   Patient remained critical, at risk for life threatening decompensation.    I have spent 30 minutes providing critical care management to this patient.    By signing my name below, I, Abhijit Ngo, attest that this documentation has been prepared under the direction and in the presence of BROOKE Falcon   Electronically signed: Brian Slaughter, 12-13-22 @ 20:38    I, BROOKE Falcon, personally performed the services described in this documentation. all medical record entries made by the karlaibkyle were at my direction and in my presence. I have reviewed the chart and agree that the record reflects my personal performance and is accurate and complete  Electronically signed: BROOKE Falcon

## 2022-12-13 NOTE — PROGRESS NOTE ADULT - ASSESSMENT
62M CAD, CABG, DM, HTN, chol admitted with pancreatitis not necrotizing on the CT scan and cholecystitis.  Sent to SICU but developed respiratory failure presumed to be due to ARDS. Moved to the CTU for ECMO for cardiogenic shock vs ARDS.  ECMO out, IABP placed.  Leukocytosis    Fever, rising leucocytosis, SIRS/sepsis, ERIC  continue meropenem vanco  level ok    diflucan added   - repeat UC and BC sent    ct scan with improving pancreatitis

## 2022-12-13 NOTE — PROGRESS NOTE ADULT - TIME BILLING
Total time spent including the following  [x] Physical chart review and documentation   An extensive review of the physical chart, electronic health record, and documentation was conducted to obtain collateral information including but not limited to:   - Current inpatient records (ED, H&P, primary team, and consultants if applicable)   - Inpatient values/results (biomarkers, immunoassays, imaging, and microbiology results)   - Social work assessments   - Current or proposed treatment plans  [x]care coordination  [x]discussion with the primary team:  [x]discussion with floor staff:

## 2022-12-13 NOTE — PROGRESS NOTE ADULT - PROBLEM SELECTOR PLAN 2
Will sign off.         Mynor Mitchell MD  Associate Chief Geriatrics and Palliative Care (GaP) [facility]   GaP Consult Service   , [employer] at [facility]      Please contact me via Teams from Monday through Friday between 9am-5pm. If not answering, please call the palliative care pager [phone]    After 5pm and on weekends, please see the contact information below:    In the event of newly developing, evolving, or worsening symptoms, please contact the Palliative Medicine team via pager (if the patient is at [facility] #[phone] or if the patient is at [facility] #[phone]) The Geriatric and Palliative Medicine service has coverage 24 hours a day/ 7 days a week to provide medical recommendations regarding symptom management needs via telephone Will sign off.         Mynor Mitchell MD  Associate Chief Geriatrics and Palliative Care (GaP) [facility]   GaP Consult Service   , [employer] at [facility]      Please contact me via Teams from Monday through Friday between 9am-5pm. If not answering, please call the palliative care pager [phone]    After 5pm and on weekends, please see the contact information below:    In the event of newly developing, evolving, or worsening symptoms, please contact the Palliative Medicine team via pager (if the patient is at [facility] #[phone] or if the patient is at [facility] #[phone]) The Geriatric and Palliative Medicine service has coverage 24 hours a day/ 7 days a week to provide medical recommendations regarding symptom management needs via telephone Will sign off.         Mynor Mitchell MD  Associate Chief Geriatrics and Palliative Care (GaP) [facility]   GaP Consult Service   , [employer] at [facility]      Please contact me via Teams from Monday through Friday between 9am-5pm. If not answering, please call the palliative care pager [phone]    After 5pm and on weekends, please see the contact information below:    In the event of newly developing, evolving, or worsening symptoms, please contact the Palliative Medicine team via pager (if the patient is at [facility] #[phone] or if the patient is at [facility] #[phone]) The Geriatric and Palliative Medicine service has coverage 24 hours a day/ 7 days a week to provide medical recommendations regarding symptom management needs via telephone

## 2022-12-13 NOTE — PROGRESS NOTE ADULT - SUBJECTIVE AND OBJECTIVE BOX
[facility] DIVISION OF KIDNEY DISEASES AND HYPERTENSION   FOLLOW UP NOTE    --------------------------------------------------------------------------------  HPI:  63 y/o male with PMH of CABG, DM, HTN, HLD presenting as transfer from [place] for c/f STEMI. Course c/b gallstone pancreatitis leading to ARDS and shock & cardiac arrest now on [facility] ECMO. Had significant troponin elevation and his LVEF down to 8%. Pt was deemed to be too unstable to have an angiogram and potential PCI due to his co-morbidities & a new subdural bleed. Pt being seen for ERIC. SCr on arrival was 2.15; trended down to hector 1.6 on 11/25 and eventually increased to 3.95 11/28. Pt received IV diuretics as per CTU.    Patient seen & examined today. Pt is intubated, unable to obtain ROS. Pt initiated on CRRT on 12/5/22 to optimize volume status and electrolytes. Pt tolerating CRRT overnight with target net negative fluid balance. Pt underwent dennaculation of ECMO on 12/8/22. Pt was restarted overnight on 12/9/22 for volume overload.  Pt tolerating CRRT.     PAST HISTORY  --------------------------------------------------------------------------------  No significant changes to PMH, PSH, FHx, SHx, unless otherwise noted    ALLERGIES & MEDICATIONS  --------------------------------------------------------------------------------  Allergies    No Known Allergies    Intolerances    Standing Inpatient Medications  aMIOdarone    Tablet   Oral   aMIOdarone    Tablet 400 milliGRAM(s) Oral every 8 hours  atorvastatin 80 milliGRAM(s) Oral at bedtime  chlorhexidine 0.12% Liquid 15 milliLiter(s) Oral Mucosa every 12 hours  chlorhexidine 2% Cloths 1 Application(s) Topical <User Schedule>  CRRT Treatment    <Continuous>  dexMEDEtomidine Infusion 0.3 MICROgram(s)/kG/Hr IV Continuous <Continuous>  fluconAZOLE IVPB      fluconAZOLE IVPB 200 milliGRAM(s) IV Intermittent every 24 hours  heparin  Infusion 800 Unit(s)/Hr IV Continuous <Continuous>  insulin lispro (ADMELOG) corrective regimen sliding scale   SubCutaneous every 4 hours  meropenem  IVPB 1000 milliGRAM(s) IV Intermittent every 12 hours  milrinone Infusion 0.1 MICROgram(s)/kG/Min IV Continuous <Continuous>  multivitamin/minerals/iron Oral Solution (CENTRUM) 15 milliLiter(s) Oral daily  pantoprazole  Injectable 40 milliGRAM(s) IV Push daily  Phoxillum Filtration BK 4 / 2.5 5000 milliLiter(s) CRRT <Continuous>  polyethylene glycol 3350 17 Gram(s) Oral two times a day  PrismaSATE Dialysate BK 0 / 3.5 5000 milliLiter(s) CRRT <Continuous>  PrismaSOL Filtration BGK 4 / 2.5 5000 milliLiter(s) CRRT <Continuous>  senna 2 Tablet(s) Oral at bedtime  sodium chloride 0.9%. 1000 milliLiter(s) IV Continuous <Continuous>  thiamine 100 milliGRAM(s) Enteral Tube daily  vancomycin  IVPB 1000 milliGRAM(s) IV Intermittent every 24 hours    PRN Inpatient Medications  acetaminophen    Suspension .. 650 milliGRAM(s) Enteral Tube every 6 hours PRN      REVIEW OF SYSTEMS  --------------------------------------------------------------------------------  Unable to obtain    VITALS/PHYSICAL EXAM  --------------------------------------------------------------------------------  T(C): 36.8 (12-13-22 @ 08:00), Max: 37.1 (12-13-22 @ 04:00)  HR: 108 (12-13-22 @ 08:49) (100 - 123)  BP: --  RR: 20 (12-13-22 @ 08:15) (14 - 34)  SpO2: 100% (12-13-22 @ 08:49) (98% - 100%)  Wt(kg): --      12-12-22 @ 07:01  -  12-13-22 @ 07:00  --------------------------------------------------------  IN: 2206.9 mL / OUT: 3107 mL / NET: -900.1 mL    12-13-22 @ 07:01  -  12-13-22 @ 08:51  --------------------------------------------------------  IN: 91.3 mL / OUT: 124 mL / NET: -32.7 mL      Physical Exam:  	Gen: ill appearing male intubated   	HEENT: Anicteric  	Pulm: on CMV via ETT+  	CV: S1S2+  	Abd: Soft, +BS       	Ext: LE edema B/L++  	Neuro: awake and nods spontaneously  	Skin: Warm and dry              Dialysis acces: right non tunneled HD catheter    LABS/STUDIES  --------------------------------------------------------------------------------              9.0    17.35 >-----------<  128      [12-13-22 @ 01:08]              28.7     135  |  98  |  22  ----------------------------<  124      [12-13-22 @ 01:08]  3.3   |  21  |  1.38        Ca     8.5     [12-13-22 @ 01:08]      Mg     2.1     [12-13-22 @ 01:08]      Phos  3.5     [12-13-22 @ 01:08]    Creatinine Trend:  SCr 1.38 [12-13 @ 01:08]  SCr 1.55 [12-12 @ 00:41]  SCr 1.84 [12-11 @ 00:26]  SCr 2.19 [12-10 @ 00:47]  SCr 2.29 [12-09 @ 12:23]    Urinalysis - [12-10-22 @ 20:01]      Color Brown / Appearance Slightly Turbid / SG 1.020 / pH 5.0      Gluc 100 mg/dL / Ketone Trace  / Bili Moderate / Urobili Negative       Blood Large / Protein 300 mg/dL / Leuk Est Large / Nitrite Positive      RBC >50 / WBC >50 / Hyaline  / Gran 10 / Sq Epi  / Non Sq Epi 3 / Bacteria Moderate [facility] DIVISION OF KIDNEY DISEASES AND HYPERTENSION   FOLLOW UP NOTE    --------------------------------------------------------------------------------  HPI:  63 y/o male with PMH of CABG, DM, HTN, HLD presenting as transfer from [place] for c/f STEMI. Course c/b gallstone pancreatitis leading to ARDS and shock & cardiac arrest now on VA ECMO. Had significant troponin elevation and his LVEF down to 8%. Pt was deemed to be too unstable to have an angiogram and potential PCI due to his co-morbidities & a new subdural bleed. Pt being seen for ERIC. SCr on arrival was 2.15; trended down to hector 1.6 on 11/25 and eventually increased to 3.95 11/28. Pt received IV diuretics as per CTU.    Patient seen & examined today. Pt is intubated, unable to obtain ROS. Pt initiated on CRRT on 12/5/22 to optimize volume status and electrolytes. Pt tolerating CRRT overnight with target net negative fluid balance. Pt underwent dennaculation of ECMO on 12/8/22. Pt was restarted overnight on 12/9/22 for volume overload.  Pt tolerating CRRT.     PAST HISTORY  --------------------------------------------------------------------------------  No significant changes to PMH, PSH, FHx, SHx, unless otherwise noted    ALLERGIES & MEDICATIONS  --------------------------------------------------------------------------------  Allergies    No Known Allergies    Intolerances    Standing Inpatient Medications  aMIOdarone    Tablet   Oral   aMIOdarone    Tablet 400 milliGRAM(s) Oral every 8 hours  atorvastatin 80 milliGRAM(s) Oral at bedtime  chlorhexidine 0.12% Liquid 15 milliLiter(s) Oral Mucosa every 12 hours  chlorhexidine 2% Cloths 1 Application(s) Topical <User Schedule>  CRRT Treatment    <Continuous>  dexMEDEtomidine Infusion 0.3 MICROgram(s)/kG/Hr IV Continuous <Continuous>  fluconAZOLE IVPB      fluconAZOLE IVPB 200 milliGRAM(s) IV Intermittent every 24 hours  heparin  Infusion 800 Unit(s)/Hr IV Continuous <Continuous>  insulin lispro (ADMELOG) corrective regimen sliding scale   SubCutaneous every 4 hours  meropenem  IVPB 1000 milliGRAM(s) IV Intermittent every 12 hours  milrinone Infusion 0.1 MICROgram(s)/kG/Min IV Continuous <Continuous>  multivitamin/minerals/iron Oral Solution (CENTRUM) 15 milliLiter(s) Oral daily  pantoprazole  Injectable 40 milliGRAM(s) IV Push daily  Phoxillum Filtration BK 4 / 2.5 5000 milliLiter(s) CRRT <Continuous>  polyethylene glycol 3350 17 Gram(s) Oral two times a day  PrismaSATE Dialysate BK 0 / 3.5 5000 milliLiter(s) CRRT <Continuous>  PrismaSOL Filtration BGK 4 / 2.5 5000 milliLiter(s) CRRT <Continuous>  senna 2 Tablet(s) Oral at bedtime  sodium chloride 0.9%. 1000 milliLiter(s) IV Continuous <Continuous>  thiamine 100 milliGRAM(s) Enteral Tube daily  vancomycin  IVPB 1000 milliGRAM(s) IV Intermittent every 24 hours    PRN Inpatient Medications  acetaminophen    Suspension .. 650 milliGRAM(s) Enteral Tube every 6 hours PRN      REVIEW OF SYSTEMS  --------------------------------------------------------------------------------  Unable to obtain    VITALS/PHYSICAL EXAM  --------------------------------------------------------------------------------  T(C): 36.8 (12-13-22 @ 08:00), Max: 37.1 (12-13-22 @ 04:00)  HR: 108 (12-13-22 @ 08:49) (100 - 123)  BP: --  RR: 20 (12-13-22 @ 08:15) (14 - 34)  SpO2: 100% (12-13-22 @ 08:49) (98% - 100%)  Wt(kg): --      12-12-22 @ 07:01  -  12-13-22 @ 07:00  --------------------------------------------------------  IN: 2206.9 mL / OUT: 3107 mL / NET: -900.1 mL    12-13-22 @ 07:01  -  12-13-22 @ 08:51  --------------------------------------------------------  IN: 91.3 mL / OUT: 124 mL / NET: -32.7 mL      Physical Exam:  	Gen: ill appearing male intubated   	HEENT: Anicteric  	Pulm: on CMV via ETT+  	CV: S1S2+  	Abd: Soft, +BS       	Ext: LE edema B/L++  	Neuro: awake and nods spontaneously  	Skin: Warm and dry              Dialysis acces: right non tunneled HD catheter    LABS/STUDIES  --------------------------------------------------------------------------------              9.0    17.35 >-----------<  128      [12-13-22 @ 01:08]              28.7     135  |  98  |  22  ----------------------------<  124      [12-13-22 @ 01:08]  3.3   |  21  |  1.38        Ca     8.5     [12-13-22 @ 01:08]      Mg     2.1     [12-13-22 @ 01:08]      Phos  3.5     [12-13-22 @ 01:08]    Creatinine Trend:  SCr 1.38 [12-13 @ 01:08]  SCr 1.55 [12-12 @ 00:41]  SCr 1.84 [12-11 @ 00:26]  SCr 2.19 [12-10 @ 00:47]  SCr 2.29 [12-09 @ 12:23]    Urinalysis - [12-10-22 @ 20:01]      Color Brown / Appearance Slightly Turbid / SG 1.020 / pH 5.0      Gluc 100 mg/dL / Ketone Trace  / Bili Moderate / Urobili Negative       Blood Large / Protein 300 mg/dL / Leuk Est Large / Nitrite Positive      RBC >50 / WBC >50 / Hyaline  / Gran 10 / Sq Epi  / Non Sq Epi 3 / Bacteria Moderate [facility] DIVISION OF KIDNEY DISEASES AND HYPERTENSION   FOLLOW UP NOTE    --------------------------------------------------------------------------------  HPI:  61 y/o male with PMH of CABG, DM, HTN, HLD presenting as transfer from [place] for c/f STEMI. Course c/b gallstone pancreatitis leading to ARDS and shock & cardiac arrest now on VA ECMO. Had significant troponin elevation and his LVEF down to 8%. Pt was deemed to be too unstable to have an angiogram and potential PCI due to his co-morbidities & a new subdural bleed. Pt being seen for ERIC. SCr on arrival was 2.15; trended down to hector 1.6 on 11/25 and eventually increased to 3.95 11/28. Pt received IV diuretics as per CTU.    Patient seen & examined today. Pt is intubated, unable to obtain ROS. Pt initiated on CRRT on 12/5/22 to optimize volume status and electrolytes. Pt tolerating CRRT overnight with target net negative fluid balance. Pt underwent dennaculation of ECMO on 12/8/22. Pt was restarted overnight on 12/9/22 for volume overload.  Pt tolerating CRRT.     PAST HISTORY  --------------------------------------------------------------------------------  No significant changes to PMH, PSH, FHx, SHx, unless otherwise noted    ALLERGIES & MEDICATIONS  --------------------------------------------------------------------------------  Allergies    No Known Allergies    Intolerances    Standing Inpatient Medications  aMIOdarone    Tablet   Oral   aMIOdarone    Tablet 400 milliGRAM(s) Oral every 8 hours  atorvastatin 80 milliGRAM(s) Oral at bedtime  chlorhexidine 0.12% Liquid 15 milliLiter(s) Oral Mucosa every 12 hours  chlorhexidine 2% Cloths 1 Application(s) Topical <User Schedule>  CRRT Treatment    <Continuous>  dexMEDEtomidine Infusion 0.3 MICROgram(s)/kG/Hr IV Continuous <Continuous>  fluconAZOLE IVPB      fluconAZOLE IVPB 200 milliGRAM(s) IV Intermittent every 24 hours  heparin  Infusion 800 Unit(s)/Hr IV Continuous <Continuous>  insulin lispro (ADMELOG) corrective regimen sliding scale   SubCutaneous every 4 hours  meropenem  IVPB 1000 milliGRAM(s) IV Intermittent every 12 hours  milrinone Infusion 0.1 MICROgram(s)/kG/Min IV Continuous <Continuous>  multivitamin/minerals/iron Oral Solution (CENTRUM) 15 milliLiter(s) Oral daily  pantoprazole  Injectable 40 milliGRAM(s) IV Push daily  Phoxillum Filtration BK 4 / 2.5 5000 milliLiter(s) CRRT <Continuous>  polyethylene glycol 3350 17 Gram(s) Oral two times a day  PrismaSATE Dialysate BK 0 / 3.5 5000 milliLiter(s) CRRT <Continuous>  PrismaSOL Filtration BGK 4 / 2.5 5000 milliLiter(s) CRRT <Continuous>  senna 2 Tablet(s) Oral at bedtime  sodium chloride 0.9%. 1000 milliLiter(s) IV Continuous <Continuous>  thiamine 100 milliGRAM(s) Enteral Tube daily  vancomycin  IVPB 1000 milliGRAM(s) IV Intermittent every 24 hours    PRN Inpatient Medications  acetaminophen    Suspension .. 650 milliGRAM(s) Enteral Tube every 6 hours PRN      REVIEW OF SYSTEMS  --------------------------------------------------------------------------------  Unable to obtain    VITALS/PHYSICAL EXAM  --------------------------------------------------------------------------------  T(C): 36.8 (12-13-22 @ 08:00), Max: 37.1 (12-13-22 @ 04:00)  HR: 108 (12-13-22 @ 08:49) (100 - 123)  BP: --  RR: 20 (12-13-22 @ 08:15) (14 - 34)  SpO2: 100% (12-13-22 @ 08:49) (98% - 100%)  Wt(kg): --      12-12-22 @ 07:01  -  12-13-22 @ 07:00  --------------------------------------------------------  IN: 2206.9 mL / OUT: 3107 mL / NET: -900.1 mL    12-13-22 @ 07:01  -  12-13-22 @ 08:51  --------------------------------------------------------  IN: 91.3 mL / OUT: 124 mL / NET: -32.7 mL      Physical Exam:  	Gen: ill appearing male intubated   	HEENT: Anicteric  	Pulm: on CMV via ETT+  	CV: S1S2+  	Abd: Soft, +BS       	Ext: LE edema B/L++  	Neuro: awake and nods spontaneously  	Skin: Warm and dry              Dialysis acces: right non tunneled HD catheter    LABS/STUDIES  --------------------------------------------------------------------------------              9.0    17.35 >-----------<  128      [12-13-22 @ 01:08]              28.7     135  |  98  |  22  ----------------------------<  124      [12-13-22 @ 01:08]  3.3   |  21  |  1.38        Ca     8.5     [12-13-22 @ 01:08]      Mg     2.1     [12-13-22 @ 01:08]      Phos  3.5     [12-13-22 @ 01:08]    Creatinine Trend:  SCr 1.38 [12-13 @ 01:08]  SCr 1.55 [12-12 @ 00:41]  SCr 1.84 [12-11 @ 00:26]  SCr 2.19 [12-10 @ 00:47]  SCr 2.29 [12-09 @ 12:23]    Urinalysis - [12-10-22 @ 20:01]      Color Brown / Appearance Slightly Turbid / SG 1.020 / pH 5.0      Gluc 100 mg/dL / Ketone Trace  / Bili Moderate / Urobili Negative       Blood Large / Protein 300 mg/dL / Leuk Est Large / Nitrite Positive      RBC >50 / WBC >50 / Hyaline  / Gran 10 / Sq Epi  / Non Sq Epi 3 / Bacteria Moderate

## 2022-12-13 NOTE — PROGRESS NOTE ADULT - ASSESSMENT
63 YO M with a history of CAD s/p 4v CABG ~2019 in [facility], DM2 (A1c 7.3%), and HTN who initially presented to OSH with abdominal pain and n/v and found to have pancreatitis and elevated troponins. CT abdomen revealed acute pancreatitis and his initial LVEF was 40-45%. He developed MSOF with hypoxic respiratory failure requiring intubation and ERIC and went into hypoxic PEA arrest requiring ACLS and due to persistent hypoxia and hypotension on pressors after ROSC was cannulated to [facility] on 11/25. Notably CTH that day revealed small subdural hemorrhage.     His post arrest TTE on 11/26 showed a signficantly worse LVEF of 5-10% with an AV that was only minimally opening. Geriatrics and Palliative Medicine ([facility]) Team was called for GOC and support through [facility] ECMO  63 YO M with a history of CAD s/p 4v CABG ~2019 in [facility], DM2 (A1c 7.3%), and HTN who initially presented to OSH with abdominal pain and n/v and found to have pancreatitis and elevated troponins. CT abdomen revealed acute pancreatitis and his initial LVEF was 40-45%. He developed MSOF with hypoxic respiratory failure requiring intubation and ERIC and went into hypoxic PEA arrest requiring ACLS and due to persistent hypoxia and hypotension on pressors after ROSC was cannulated to [facility] ECMO on 11/25. Notably CTH that day revealed small subdural hemorrhage.     His post arrest TTE on 11/26 showed a signficantly worse LVEF of 5-10% with an AV that was only minimally opening. Geriatrics and Palliative Medicine (GaP) Team was called for GOC and support through [facility] ECMO  63 YO M with a history of CAD s/p 4v CABG ~2019 in [facility], DM2 (A1c 7.3%), and HTN who initially presented to OSH with abdominal pain and n/v and found to have pancreatitis and elevated troponins. CT abdomen revealed acute pancreatitis and his initial LVEF was 40-45%. He developed MSOF with hypoxic respiratory failure requiring intubation and ERIC and went into hypoxic PEA arrest requiring ACLS and due to persistent hypoxia and hypotension on pressors after ROSC was cannulated to [facility] ECMO on 11/25. Notably CTH that day revealed small subdural hemorrhage.     His post arrest TTE on 11/26 showed a signficantly worse LVEF of 5-10% with an AV that was only minimally opening. Geriatrics and Palliative Medicine (GaP) Team was called for GOC and support through [facility] ECMO

## 2022-12-13 NOTE — PROGRESS NOTE ADULT - ASSESSMENT
61 YO M with a history of CAD s/p 4v CABG ~2019 in [facility], DM2 (A1c 7.3%), and HTN who initially presented to OSH with abdominal pain and n/v and found to have pancreatitis with lipase > 3000 though also with elevated troponins. CT abdomen revealed acute pancreatitis and his initial LVEF was 40-45%. He developed MSOF with hypoxic respiratory failure requiring intubation and ERIC and went into hypoxic PEA arrest requiring ACLS and due to persistent hypoxia and hypotension on pressors after ROSC was cannulated to [facility] ECMO (LFV, RFA, no anterograde perfusion catheter) on 11/25. Notably CTH that day revealed small subdural hemorrhage.     His post arrest TTE on 11/26 showed a signficantly worse LVEF of 5-10% with an AV that was only minimally opening. Since then he has had improvement in his LV function (now ~35-40%) and improved pulsatility while tolerating progressive slow ECMO weans. ECMO turndown (note in chart 11/30) was reassuring for ability to decannulate to IABP/inotropes. LHC 12/06 showed closure of his 3 vein grafts with graft to LAD patent though with distal native disease. IABP placed, ECMO successfully decannulated 12/08 (transfused 2u pRBCs during). His ARDS is improving. Currently on CVVH.    Previous cardiac studies:  LHC (12/06/22) - patent LIMA-LAD, RCA , LCx , aortogram w/ closure of 3 vein grafts, LVEDP 22 mm Hg, left-to-left and left-to-right collaterals  TTE (12/03/22) - mod-to-sev segmental LVSD (inferolateral, inferior, inferoseptal hypokinesis)    Hemodynamics:  12/12 IABP 1:1, aDBP 103, aMAP 84, CVP 7, lactate 1.3, mVO2 79.3%, CO 10.5, CI 5.0,   12/09 IABP 1:1, aDBP 126, aMAP 92, CVP 6, lactate 1.2  12/08 IABP 1:1, aDBP 120, aMAP 87, CVP 5  12/07 IABP 1:1 ECMO 3600 RPM 4.0 LPM, aMAP 90, aDBP 123, mVO2 66.8%, CVP 7, CO 6.8, CI 3.3    *** INCOMPLETE NOTE *** INCOMPLETE NOTE *** INCOMPLETE NOTE *** INCOMPLETE NOTE *** INCOMPLETE NOTE *** INCOMPLETE NOTE *** INCOMPLETE NOTE *** INCOMPLETE NOTE *** INCOMPLETE NOTE *** INCOMPLETE NOTE *** INCOMPLETE NOTE *** INCOMPLETE NOTE *** INCOMPLETE NOTE *** INCOMPLETE NOTE *** INCOMPLETE NOTE *** INCOMPLETE NOTE *** INCOMPLETE NOTE *** INCOMPLETE NOTE *** INCOMPLETE NOTE *** INCOMPLETE NOTE ***  RECS BELOW ARE NOT TO BE FOLLOWED UNTIL FINALIZED  amio 400 q8h, atorva 80, CRRT, dexmed gtt, hep gtt, mil 0.1,   flucon 200 IV daily, zaina 1000 q12h, vanc    Problem/Plan - 1:  ·  Problem: Non-ST elevation MI (NSTEMI).   ·  Plan:   - troponin peaked at > 81184   - [facility] 12/06 with IABP placement revealed that 3 grafts are closed w/ distal native disease from remaining LAD graft  - continue statin  - would start ASA when okay with surgical team.    Problem/Plan - 2:  ·  Problem: Gall stone pancreatitis.   ·  Plan:   - CT abd showing acute pancreatitis  - RUQ showing sludge, no stones  - lipase > 3000 11/24, normalized prior and now fluctuating  - conservative care  - appreciate GI recs, no intervention planned   - surgery following   - appreciate ID recs.    Problem/Plan - 3:  ·  Problem: ERIC (acute kidney injury).   ·  Plan:   - likely arrest associated ATN and now worsened from hypovolemia   - renal following, c/t CVVH.    Problem/Plan - 4:  ·  Problem: Cardiogenic shock.   ·  Plan:  - ECMO decannulated 12/08 after successful wean  - IABP in place, keep 1:1, target augmented MAP 70s though will defer afterload agent titration while there is c/f developing sepsis  - cont hep gtt for AC while on IABP  - send infectious workup and start broad spec abx given lack of augmentation of MAP w/ IABP and concern for vasoplegia/febrile  - lower milrinone  - added vaso given concern for septic component.    Problem/Plan - 5:  ·  Problem: Nontraumatic subdural hematoma.   ·  Plan:   - small 3mm subdural hemorrhage stable on repeat CTs  - okay to continue heparin, appreciate neuro recs  - keep MAP < 75 mmHg and careful monitoring of PTT.    Problem/Plan - 6:  ·  Problem: Acute respiratory failure with hypoxia.   ·  Plan:   - CXR improved with more lung volumes after bronch and increased PEEP  - bronch showed erythematous airways with scant bleeding  - extubate when able  - continue treatment for pancreatitis related ARDS.    Problem/Plan - 7:  ·  Problem: Supraventricular tachycardia.   ·  Plan:   - on review of tele, it appears to be sinus tach  - consider stopping amio.    Problem/Plan - 8:  ·  Problem: Fever.   ·  Plan:   - f/u ID recs, multiple potential sources given recent lines/procedures, as well as possible biliary source  - consider removing non-vital lines  - f/u pan cultures and start abx.    Patient remains critically ill. Was noted to have dropping central sats yesterday however this morning his central sat was 79. Also noted to have rising WBC and fevers, patient now likely has a septic component. Agree with cultures and broad spectrum antibiotics. For now will continue with IABP support and will reassess in the coming days if we can come down to inotropic support alone.   63 YO M with a history of CAD s/p 4v CABG ~2019 in [facility], DM2 (A1c 7.3%), and HTN who initially presented to OSH with abdominal pain and n/v and found to have pancreatitis with lipase > 3000 though also with elevated troponins. CT abdomen revealed acute pancreatitis and his initial LVEF was 40-45%. He developed MSOF with hypoxic respiratory failure requiring intubation and ERIC and went into hypoxic PEA arrest requiring ACLS and due to persistent hypoxia and hypotension on pressors after ROSC was cannulated to [facility] ECMO (LFV, RFA, no anterograde perfusion catheter) on 11/25. Notably CTH that day revealed small subdural hemorrhage.     His post arrest TTE on 11/26 showed a signficantly worse LVEF of 5-10% with an AV that was only minimally opening. Since then he has had improvement in his LV function (now ~35-40%) and improved pulsatility while tolerating progressive slow ECMO weans. ECMO turndown (note in chart 11/30) was reassuring for ability to decannulate to IABP/inotropes. LHC 12/06 showed closure of his 3 vein grafts with graft to LAD patent though with distal native disease. IABP placed, ECMO successfully decannulated 12/08 (transfused 2u pRBCs during). His ARDS is improving. Currently on CVVH.    Previous cardiac studies:  LHC (12/06/22) - patent LIMA-LAD, RCA , LCx , aortogram w/ closure of 3 vein grafts, LVEDP 22 mm Hg, left-to-left and left-to-right collaterals  TTE (12/03/22) - mod-to-sev segmental LVSD (inferolateral, inferior, inferoseptal hypokinesis)    Hemodynamics:  12/12 IABP 1:1, aDBP 103, aMAP 84, CVP 7, lactate 1.3, mVO2 79.3%, CO 10.5, CI 5.0,   12/09 IABP 1:1, aDBP 126, aMAP 92, CVP 6, lactate 1.2  12/08 IABP 1:1, aDBP 120, aMAP 87, CVP 5  12/07 IABP 1:1 ECMO 3600 RPM 4.0 LPM, aMAP 90, aDBP 123, mVO2 66.8%, CVP 7, CO 6.8, CI 3.3    *** INCOMPLETE NOTE *** INCOMPLETE NOTE *** INCOMPLETE NOTE *** INCOMPLETE NOTE *** INCOMPLETE NOTE *** INCOMPLETE NOTE *** INCOMPLETE NOTE *** INCOMPLETE NOTE *** INCOMPLETE NOTE *** INCOMPLETE NOTE *** INCOMPLETE NOTE *** INCOMPLETE NOTE *** INCOMPLETE NOTE *** INCOMPLETE NOTE *** INCOMPLETE NOTE *** INCOMPLETE NOTE *** INCOMPLETE NOTE *** INCOMPLETE NOTE *** INCOMPLETE NOTE *** INCOMPLETE NOTE ***  RECS BELOW ARE NOT TO BE FOLLOWED UNTIL FINALIZED  amio 400 q8h, atorva 80, CRRT, dexmed gtt, hep gtt, mil 0.1,   flucon 200 IV daily, zaina 1000 q12h, vanc    Problem/Plan - 1:  ·  Problem: Non-ST elevation MI (NSTEMI).   ·  Plan:   - troponin peaked at > 13628   - [facility] 12/06 with IABP placement revealed that 3 grafts are closed w/ distal native disease from remaining LAD graft  - continue statin  - would start ASA when okay with surgical team.    Problem/Plan - 2:  ·  Problem: Gall stone pancreatitis.   ·  Plan:   - CT abd showing acute pancreatitis  - RUQ showing sludge, no stones  - lipase > 3000 11/24, normalized prior and now fluctuating  - conservative care  - appreciate GI recs, no intervention planned   - surgery following   - appreciate ID recs.    Problem/Plan - 3:  ·  Problem: ERIC (acute kidney injury).   ·  Plan:   - likely arrest associated ATN and now worsened from hypovolemia   - renal following, c/t CVVH.    Problem/Plan - 4:  ·  Problem: Cardiogenic shock.   ·  Plan:  - ECMO decannulated 12/08 after successful wean  - IABP in place, keep 1:1, target augmented MAP 70s though will defer afterload agent titration while there is c/f developing sepsis  - cont hep gtt for AC while on IABP  - send infectious workup and start broad spec abx given lack of augmentation of MAP w/ IABP and concern for vasoplegia/febrile  - lower milrinone  - added vaso given concern for septic component.    Problem/Plan - 5:  ·  Problem: Nontraumatic subdural hematoma.   ·  Plan:   - small 3mm subdural hemorrhage stable on repeat CTs  - okay to continue heparin, appreciate neuro recs  - keep MAP < 75 mmHg and careful monitoring of PTT.    Problem/Plan - 6:  ·  Problem: Acute respiratory failure with hypoxia.   ·  Plan:   - CXR improved with more lung volumes after bronch and increased PEEP  - bronch showed erythematous airways with scant bleeding  - extubate when able  - continue treatment for pancreatitis related ARDS.    Problem/Plan - 7:  ·  Problem: Supraventricular tachycardia.   ·  Plan:   - on review of tele, it appears to be sinus tach  - consider stopping amio.    Problem/Plan - 8:  ·  Problem: Fever.   ·  Plan:   - f/u ID recs, multiple potential sources given recent lines/procedures, as well as possible biliary source  - consider removing non-vital lines  - f/u pan cultures and start abx.    Patient remains critically ill. Was noted to have dropping central sats yesterday however this morning his central sat was 79. Also noted to have rising WBC and fevers, patient now likely has a septic component. Agree with cultures and broad spectrum antibiotics. For now will continue with IABP support and will reassess in the coming days if we can come down to inotropic support alone.   63 YO M with a history of CAD s/p 4v CABG ~2019 in [facility], DM2 (A1c 7.3%), and HTN who initially presented to OSH with abdominal pain and n/v and found to have pancreatitis with lipase > 3000 though also with elevated troponins. CT abdomen revealed acute pancreatitis and his initial LVEF was 40-45%. He developed MSOF with hypoxic respiratory failure requiring intubation and ERIC and went into hypoxic PEA arrest requiring ACLS and due to persistent hypoxia and hypotension on pressors after ROSC was cannulated to [facility] ECMO (LFV, RFA, no anterograde perfusion catheter) on 11/25. Notably CTH that day revealed small subdural hemorrhage.     His post arrest TTE on 11/26 showed a signficantly worse LVEF of 5-10% with an AV that was only minimally opening. Since then he has had improvement in his LV function (now ~35-40%) and improved pulsatility while tolerating progressive slow ECMO weans. ECMO turndown (note in chart 11/30) was reassuring for ability to decannulate to IABP/inotropes. LHC 12/06 showed closure of his 3 vein grafts with graft to LAD patent though with distal native disease. IABP placed, ECMO successfully decannulated 12/08 (transfused 2u pRBCs during). His ARDS is improving. Currently on CVVH.    Previous cardiac studies:  LHC (12/06/22) - patent LIMA-LAD, RCA , LCx , aortogram w/ closure of 3 vein grafts, LVEDP 22 mm Hg, left-to-left and left-to-right collaterals  TTE (12/03/22) - mod-to-sev segmental LVSD (inferolateral, inferior, inferoseptal hypokinesis)    Hemodynamics:  12/12 IABP 1:1, aDBP 103, aMAP 84, CVP 7, lactate 1.3, mVO2 79.3%, CO 10.5, CI 5.0,   12/09 IABP 1:1, aDBP 126, aMAP 92, CVP 6, lactate 1.2  12/08 IABP 1:1, aDBP 120, aMAP 87, CVP 5  12/07 IABP 1:1 ECMO 3600 RPM 4.0 LPM, aMAP 90, aDBP 123, mVO2 66.8%, CVP 7, CO 6.8, CI 3.3    *** INCOMPLETE NOTE *** INCOMPLETE NOTE *** INCOMPLETE NOTE *** INCOMPLETE NOTE *** INCOMPLETE NOTE *** INCOMPLETE NOTE *** INCOMPLETE NOTE *** INCOMPLETE NOTE *** INCOMPLETE NOTE *** INCOMPLETE NOTE *** INCOMPLETE NOTE *** INCOMPLETE NOTE *** INCOMPLETE NOTE *** INCOMPLETE NOTE *** INCOMPLETE NOTE *** INCOMPLETE NOTE *** INCOMPLETE NOTE *** INCOMPLETE NOTE *** INCOMPLETE NOTE *** INCOMPLETE NOTE ***  RECS BELOW ARE NOT TO BE FOLLOWED UNTIL FINALIZED  amio 400 q8h, atorva 80, CRRT, dexmed gtt, hep gtt, mil 0.1,   flucon 200 IV daily, zaina 1000 q12h, vanc    Problem/Plan - 1:  ·  Problem: Non-ST elevation MI (NSTEMI).   ·  Plan:   - troponin peaked at > 39978   - [facility] 12/06 with IABP placement revealed that 3 grafts are closed w/ distal native disease from remaining LAD graft  - continue statin  - would start ASA when okay with surgical team.    Problem/Plan - 2:  ·  Problem: Gall stone pancreatitis.   ·  Plan:   - CT abd showing acute pancreatitis  - RUQ showing sludge, no stones  - lipase > 3000 11/24, normalized prior and now fluctuating  - conservative care  - appreciate GI recs, no intervention planned   - surgery following   - appreciate ID recs.    Problem/Plan - 3:  ·  Problem: ERIC (acute kidney injury).   ·  Plan:   - likely arrest associated ATN and now worsened from hypovolemia   - renal following, c/t CVVH.    Problem/Plan - 4:  ·  Problem: Cardiogenic shock.   ·  Plan:  - ECMO decannulated 12/08 after successful wean  - IABP in place, keep 1:1, target augmented MAP 70s though will defer afterload agent titration while there is c/f developing sepsis  - cont hep gtt for AC while on IABP  - send infectious workup and start broad spec abx given lack of augmentation of MAP w/ IABP and concern for vasoplegia/febrile  - lower milrinone  - added vaso given concern for septic component.    Problem/Plan - 5:  ·  Problem: Nontraumatic subdural hematoma.   ·  Plan:   - small 3mm subdural hemorrhage stable on repeat CTs  - okay to continue heparin, appreciate neuro recs  - keep MAP < 75 mmHg and careful monitoring of PTT.    Problem/Plan - 6:  ·  Problem: Acute respiratory failure with hypoxia.   ·  Plan:   - CXR improved with more lung volumes after bronch and increased PEEP  - bronch showed erythematous airways with scant bleeding  - extubate when able  - continue treatment for pancreatitis related ARDS.    Problem/Plan - 7:  ·  Problem: Supraventricular tachycardia.   ·  Plan:   - on review of tele, it appears to be sinus tach  - consider stopping amio.    Problem/Plan - 8:  ·  Problem: Fever.   ·  Plan:   - f/u ID recs, multiple potential sources given recent lines/procedures, as well as possible biliary source  - consider removing non-vital lines  - f/u pan cultures and start abx.    Patient remains critically ill. Was noted to have dropping central sats yesterday however this morning his central sat was 79. Also noted to have rising WBC and fevers, patient now likely has a septic component. Agree with cultures and broad spectrum antibiotics. For now will continue with IABP support and will reassess in the coming days if we can come down to inotropic support alone.   63 YO M with a history of CAD s/p 4v CABG ~2019 in [facility], DM2 (A1c 7.3%), and HTN who initially presented to OSH with abdominal pain and n/v and found to have pancreatitis with lipase > 3000 though also with elevated troponins. CT abdomen revealed acute pancreatitis and his initial LVEF was 40-45%. He developed MSOF with hypoxic respiratory failure requiring intubation and ERIC and went into hypoxic PEA arrest requiring ACLS and due to persistent hypoxia and hypotension on pressors after ROSC was cannulated to [facility] ECMO (LFV, RFA, no anterograde perfusion catheter) on 11/25. Notably CTH that day revealed small subdural hemorrhage.     His post arrest TTE on 11/26 showed a signficantly worse LVEF of 5-10% with an AV that was only minimally opening. Since then he has had improvement in his LV function (now ~35-40%) and improved pulsatility while tolerating progressive slow ECMO weans. ECMO turndown (note in chart 11/30) was reassuring for ability to decannulate to IABP/inotropes. LHC 12/06 showed closure of his 3 vein grafts with graft to LAD patent though with distal native disease. IABP placed, ECMO successfully decannulated 12/08 (transfused 2u pRBCs during). His ARDS is improving. Currently on CVVH.    Previous cardiac studies:  LHC (12/06/22) - patent LIMA-LAD, RCA , LCx , aortogram w/ closure of 3 vein grafts, LVEDP 22 mm Hg, left-to-left and left-to-right collaterals  TTE (12/03/22) - mod-to-sev segmental LVSD (inferolateral, inferior, inferoseptal hypokinesis)    Hemodynamics:  12/13 IABP 1:1, milrinone 0.1, aDBP 101, aMAP 85, CVP 5, lactate 1.1, mVO2 71.2%, CO 7.7, CI 3.4,   12/12 IABP 1:1, aDBP 103, aMAP 84, CVP 7, lactate 1.3, mVO2 79.3%, CO 10.5, CI 5.0,   12/09 IABP 1:1, aDBP 126, aMAP 92, CVP 6, lactate 1.2  12/08 IABP 1:1, aDBP 120, aMAP 87, CVP 5  12/07 IABP 1:1 ECMO 3600 RPM 4.0 LPM, aMAP 90, aDBP 123, mVO2 66.8%, CVP 7, CO 6.8, CI 3.3 61 YO M with a history of CAD s/p 4v CABG ~2019 in [facility], DM2 (A1c 7.3%), and HTN who initially presented to OSH with abdominal pain and n/v and found to have pancreatitis with lipase > 3000 though also with elevated troponins. CT abdomen revealed acute pancreatitis and his initial LVEF was 40-45%. He developed MSOF with hypoxic respiratory failure requiring intubation and ERIC and went into hypoxic PEA arrest requiring ACLS and due to persistent hypoxia and hypotension on pressors after ROSC was cannulated to [facility] ECMO (LFV, RFA, no anterograde perfusion catheter) on 11/25. Notably CTH that day revealed small subdural hemorrhage.     His post arrest TTE on 11/26 showed a signficantly worse LVEF of 5-10% with an AV that was only minimally opening. Since then he has had improvement in his LV function (now ~35-40%) and improved pulsatility while tolerating progressive slow ECMO weans. ECMO turndown (note in chart 11/30) was reassuring for ability to decannulate to IABP/inotropes. LHC 12/06 showed closure of his 3 vein grafts with graft to LAD patent though with distal native disease. IABP placed, ECMO successfully decannulated 12/08 (transfused 2u pRBCs during). His ARDS is improving. Currently on CVVH.    Previous cardiac studies:  LHC (12/06/22) - patent LIMA-LAD, RCA , LCx , aortogram w/ closure of 3 vein grafts, LVEDP 22 mm Hg, left-to-left and left-to-right collaterals  TTE (12/03/22) - mod-to-sev segmental LVSD (inferolateral, inferior, inferoseptal hypokinesis)    Hemodynamics:  12/13 IABP 1:1, milrinone 0.1, aDBP 101, aMAP 85, CVP 5, lactate 1.1, mVO2 71.2%, CO 7.7, CI 3.4,   12/12 IABP 1:1, aDBP 103, aMAP 84, CVP 7, lactate 1.3, mVO2 79.3%, CO 10.5, CI 5.0,   12/09 IABP 1:1, aDBP 126, aMAP 92, CVP 6, lactate 1.2  12/08 IABP 1:1, aDBP 120, aMAP 87, CVP 5  12/07 IABP 1:1 ECMO 3600 RPM 4.0 LPM, aMAP 90, aDBP 123, mVO2 66.8%, CVP 7, CO 6.8, CI 3.3 63 YO M with a history of CAD s/p 4v CABG ~2019 in [facility], DM2 (A1c 7.3%), and HTN who initially presented to OSH with abdominal pain and n/v and found to have pancreatitis with lipase > 3000 though also with elevated troponins. CT abdomen revealed acute pancreatitis and his initial LVEF was 40-45%. He developed MSOF with hypoxic respiratory failure requiring intubation and ERIC and went into hypoxic PEA arrest requiring ACLS and due to persistent hypoxia and hypotension on pressors after ROSC was cannulated to [facility] ECMO (LFV, RFA, no anterograde perfusion catheter) on 11/25. Notably CTH that day revealed small subdural hemorrhage.     His post arrest TTE on 11/26 showed a signficantly worse LVEF of 5-10% with an AV that was only minimally opening. Since then he has had improvement in his LV function (now ~35-40%) and improved pulsatility while tolerating progressive slow ECMO weans. ECMO turndown (note in chart 11/30) was reassuring for ability to decannulate to IABP/inotropes. LHC 12/06 showed closure of his 3 vein grafts with graft to LAD patent though with distal native disease. IABP placed, ECMO successfully decannulated 12/08 (transfused 2u pRBCs during). His ARDS is improving. Currently on CVVH.    Previous cardiac studies:  LHC (12/06/22) - patent LIMA-LAD, RCA , LCx , aortogram w/ closure of 3 vein grafts, LVEDP 22 mm Hg, left-to-left and left-to-right collaterals  TTE (12/03/22) - mod-to-sev segmental LVSD (inferolateral, inferior, inferoseptal hypokinesis)    Hemodynamics:  12/13 IABP 1:1, milrinone 0.1, aDBP 101, aMAP 85, CVP 5, lactate 1.1, mVO2 71.2%, CO 7.7, CI 3.4,   12/12 IABP 1:1, aDBP 103, aMAP 84, CVP 7, lactate 1.3, mVO2 79.3%, CO 10.5, CI 5.0,   12/09 IABP 1:1, aDBP 126, aMAP 92, CVP 6, lactate 1.2  12/08 IABP 1:1, aDBP 120, aMAP 87, CVP 5  12/07 IABP 1:1 ECMO 3600 RPM 4.0 LPM, aMAP 90, aDBP 123, mVO2 66.8%, CVP 7, CO 6.8, CI 3.3

## 2022-12-13 NOTE — PROGRESS NOTE ADULT - ATTENDING COMMENTS
[facility]  Cardiogenic shock  Continue CRRT  Will adjust K bath for CRRT to 4K  UF to keep net negative  Dw Daughter Bedside    Melissa Mercer MD  Off: [phone]  contact me on teams    (After 5 pm or on weekends please page the on-call fellow/attending, can check [email or web address] for schedule. Login is carmen cantor, schedule under [facility] medicine, psych, derm) [facility] ATN  Cardiogenic shock  Continue CRRT  Will adjust K bath for CRRT to 4K  UF to keep net negative  Dw Daughter Bedside    Melissa Mercer MD  Off: [phone]  contact me on teams    (After 5 pm or on weekends please page the on-call fellow/attending, can check [email or web address] for schedule. Login is carmen cantor, schedule under [facility] medicine, psych, derm) ERIC ATN  Cardiogenic shock  Continue CRRT  Will adjust K bath for CRRT to 4K  UF to keep net negative  Dw Daughter Bedside    Melissa Mercer MD  Off: [phone]  contact me on teams    (After 5 pm or on weekends please page the on-call fellow/attending, can check [email or web address] for schedule. Login is carmen cantor, schedule under [facility] medicine, psych, derm)

## 2022-12-13 NOTE — EEG REPORT - NS EEG TEXT BOX
EDUARDO REAL [facility]-[id number]     Study Start Date:  12-12-22 0949  Study End Date:  12-13-22 0800  Duration x Hours:  22 hr 11 min   --------------------------------------------------------------------------------------------------    History:  CC/ HPI Patient is a 62y old  Male who presents with a chief complaint of Acute cholecystitis, gallstone pancreatitis, ams right gaze preference  (13 Dec 2022 08:51)    MEDICATIONS  (STANDING):  aMIOdarone    Tablet   Oral   aMIOdarone    Tablet 400 milliGRAM(s) Oral every 8 hours  atorvastatin 80 milliGRAM(s) Oral at bedtime  chlorhexidine 0.12% Liquid 15 milliLiter(s) Oral Mucosa every 12 hours  chlorhexidine 2% Cloths 1 Application(s) Topical <User Schedule>  CRRT Treatment    <Continuous>  dexMEDEtomidine Infusion 0.3 MICROgram(s)/kG/Hr (6.97 mL/Hr) IV Continuous <Continuous>  fluconAZOLE IVPB      fluconAZOLE IVPB 200 milliGRAM(s) IV Intermittent every 24 hours  heparin  Infusion 800 Unit(s)/Hr (16.5 mL/Hr) IV Continuous <Continuous>  insulin lispro (ADMELOG) corrective regimen sliding scale   SubCutaneous every 4 hours  meropenem  IVPB 1000 milliGRAM(s) IV Intermittent every 12 hours  milrinone Infusion 0.1 MICROgram(s)/kG/Min (2.79 mL/Hr) IV Continuous <Continuous>  multivitamin/minerals/iron Oral Solution (CENTRUM) 15 milliLiter(s) Oral daily  pantoprazole  Injectable 40 milliGRAM(s) IV Push daily  Phoxillum Filtration BK 4 / 2.5 5000 milliLiter(s) (2200 mL/Hr) CRRT <Continuous>  Phoxillum Filtration BK 4 / 2.5 5000 milliLiter(s) (800 mL/Hr) CRRT <Continuous>  polyethylene glycol 3350 17 Gram(s) Oral two times a day  PrismaSOL Filtration BGK 4 / 2.5 5000 milliLiter(s) (200 mL/Hr) CRRT <Continuous>  senna 2 Tablet(s) Oral at bedtime  sodium chloride 0.9%. 1000 milliLiter(s) (5 mL/Hr) IV Continuous <Continuous>  thiamine 100 milliGRAM(s) Enteral Tube daily  vancomycin  IVPB 1000 milliGRAM(s) IV Intermittent every 24 hours      --------------------------------------------------------------------------------------------------  Study Interpretation:    [[[Abbreviation Key:  PDR=alpha rhythm/posterior dominant rhythm. A-P=anterior posterior.  Amplitude: ‘very low’:<20; ‘low’:20-49; ‘medium’:; ‘high’:>150uV.  Persistence for periodic/rhythmic patterns (% of epoch) ‘rare’:<1%; ‘occasional’:1-10%; ‘frequent’:10-50%; ‘abundant’:50-90%; ‘continuous’:>90%.  Persistence for sporadic discharges: ‘rare’:<1/hr; ‘occasional’:1/min-1/hr; ‘frequent’:>1/min; ‘abundant’:>1/10 sec.  RPP=rhythmic and periodic patterns; GRDA=generalized rhythmic delta activity; FIRDA=frontal intermittent GRDA; LRDA=lateralized rhythmic delta activity; TIRDA=temporal intermittent rhythmic delta activity;  LPD=PLED=lateralized periodic discharges; GPD=generalized periodic discharges; BIPDs =bilateral independent periodic discharges; Mf=multifocal; SIRPDs=stimulus induced rhythmic, periodic, or ictal appearing discharges; BIRDs=brief potentially ictal rhythmic discharges >4 Hz, lasting .5-10s; PFA (paroxysmal bursts >13 Hz or =8 Hz <10s).  Modifiers: +F=with fast component; +S=with spike component; +R=with rhythmic component.  S-B=burst suppression pattern.  Max=maximal. N1-drowsy; N2-stage II sleep; N3-slow wave sleep. SSS/BETS=small sharp spikes/benign epileptiform transients of sleep. HV=hyperventilation; PS=photic stimulation]]]    Daily EEG Visual Analysis    FINDINGS:      Background:  Continuous: continuous  Symmetry: symmetric  PDR: 8 Hz activity poorly modulated, with amplitude to 30 uV, that attenuated to eye opening.  Low amplitude frontal beta noted in wakefulness.  Reactivity: present  Voltage: normal, mostly 20-150uV  Anterior Posterior Gradient: present  Other background findings: none  Breach: absent    Background Slowing:  Generalized slowing: Intermittent diffuse theta and polymorphic delta slowing.   Focal slowing: none was present.    State Changes:   -Drowsiness noted with increased slowing, attenuation of fast activity.   -Present with N2 sleep transients with symmetric spindles and K-complexes.    Sporadic Epileptiform Discharges:    None    Rhythmic and Periodic Patterns (RPPs):  None     Electrographic and Electroclinical seizures:  None    Other Clinical Events:  None    Activation Procedures:   -Hyperventilation was not performed.    -Photic stimulation was not performed.     Artifacts:  Intermittent myogenic and movement artifacts were noted.    ECG:  The heart rate on single channel ECG was predominantly between 100-130 BPM.    EEG Classification / Summary:    Abnormal  EEG in the awake / drowsy / asleep state(s).    - Background slowing, generalized, mild     Clinical Impression:    Mild diffuse / multifocal cerebral dysfunction.   There were no epileptiform abnormalities recorded.      Nelson Dove M.D.   Epilepsy fellow    -------------------------------------------------------------------------------------------------------  [facility] EEG Reading Room Ph#: [phone]  Epilepsy Answering Service after 5PM and before 8:30AM: Ph#: [phone]      This is a preliminary report  EDUARDO REAL [facility]-[id number]     Study Start Date:  12-12-22 0949  Study End Date:  12-13-22 0800  Duration x Hours:  22 hr 11 min   --------------------------------------------------------------------------------------------------    History:  CC/ HPI Patient is a 62y old  Male who presents with a chief complaint of Acute cholecystitis, gallstone pancreatitis, ams right gaze preference  (13 Dec 2022 08:51)    MEDICATIONS  (STANDING):  aMIOdarone    Tablet   Oral   aMIOdarone    Tablet 400 milliGRAM(s) Oral every 8 hours  atorvastatin 80 milliGRAM(s) Oral at bedtime  chlorhexidine 0.12% Liquid 15 milliLiter(s) Oral Mucosa every 12 hours  chlorhexidine 2% Cloths 1 Application(s) Topical <User Schedule>  CRRT Treatment    <Continuous>  dexMEDEtomidine Infusion 0.3 MICROgram(s)/kG/Hr (6.97 mL/Hr) IV Continuous <Continuous>  fluconAZOLE IVPB      fluconAZOLE IVPB 200 milliGRAM(s) IV Intermittent every 24 hours  heparin  Infusion 800 Unit(s)/Hr (16.5 mL/Hr) IV Continuous <Continuous>  insulin lispro (ADMELOG) corrective regimen sliding scale   SubCutaneous every 4 hours  meropenem  IVPB 1000 milliGRAM(s) IV Intermittent every 12 hours  milrinone Infusion 0.1 MICROgram(s)/kG/Min (2.79 mL/Hr) IV Continuous <Continuous>  multivitamin/minerals/iron Oral Solution (CENTRUM) 15 milliLiter(s) Oral daily  pantoprazole  Injectable 40 milliGRAM(s) IV Push daily  Phoxillum Filtration BK 4 / 2.5 5000 milliLiter(s) (2200 mL/Hr) CRRT <Continuous>  Phoxillum Filtration BK 4 / 2.5 5000 milliLiter(s) (800 mL/Hr) CRRT <Continuous>  polyethylene glycol 3350 17 Gram(s) Oral two times a day  PrismaSOL Filtration BGK 4 / 2.5 5000 milliLiter(s) (200 mL/Hr) CRRT <Continuous>  senna 2 Tablet(s) Oral at bedtime  sodium chloride 0.9%. 1000 milliLiter(s) (5 mL/Hr) IV Continuous <Continuous>  thiamine 100 milliGRAM(s) Enteral Tube daily  vancomycin  IVPB 1000 milliGRAM(s) IV Intermittent every 24 hours      --------------------------------------------------------------------------------------------------  Study Interpretation:    [[[Abbreviation Key:  PDR=alpha rhythm/posterior dominant rhythm. A-P=anterior posterior.  Amplitude: ‘very low’:<20; ‘low’:20-49; ‘medium’:; ‘high’:>150uV.  Persistence for periodic/rhythmic patterns (% of epoch) ‘rare’:<1%; ‘occasional’:1-10%; ‘frequent’:10-50%; ‘abundant’:50-90%; ‘continuous’:>90%.  Persistence for sporadic discharges: ‘rare’:<1/hr; ‘occasional’:1/min-1/hr; ‘frequent’:>1/min; ‘abundant’:>1/10 sec.  RPP=rhythmic and periodic patterns; GRDA=generalized rhythmic delta activity; FIRDA=frontal intermittent GRDA; LRDA=lateralized rhythmic delta activity; TIRDA=temporal intermittent rhythmic delta activity;  LPD=PLED=lateralized periodic discharges; GPD=generalized periodic discharges; BIPDs =bilateral independent periodic discharges; Mf=multifocal; SIRPDs=stimulus induced rhythmic, periodic, or ictal appearing discharges; BIRDs=brief potentially ictal rhythmic discharges >4 Hz, lasting .5-10s; PFA (paroxysmal bursts >13 Hz or =8 Hz <10s).  Modifiers: +F=with fast component; +S=with spike component; +R=with rhythmic component.  S-B=burst suppression pattern.  Max=maximal. N1-drowsy; N2-stage II sleep; N3-slow wave sleep. SSS/BETS=small sharp spikes/benign epileptiform transients of sleep. HV=hyperventilation; PS=photic stimulation]]]    Daily EEG Visual Analysis    FINDINGS:      Background:  Continuous: continuous  Symmetry: symmetric  PDR: 8 Hz activity poorly modulated, with amplitude to 30 uV, that attenuated to eye opening.  Low amplitude frontal beta noted in wakefulness.  Reactivity: present  Voltage: normal, mostly 20-150uV  Anterior Posterior Gradient: present  Other background findings: none  Breach: absent    Background Slowing:  Generalized slowing: Intermittent diffuse theta and polymorphic delta slowing.   Focal slowing: none was present.    State Changes:   -Drowsiness noted with increased slowing, attenuation of fast activity.   -Present with N2 sleep transients with symmetric spindles and K-complexes.    Sporadic Epileptiform Discharges:    None    Rhythmic and Periodic Patterns (RPPs):  None     Electrographic and Electroclinical seizures:  None    Other Clinical Events:  None    Activation Procedures:   -Hyperventilation was not performed.    -Photic stimulation was not performed.     Artifacts:  Intermittent myogenic and movement artifacts were noted.    ECG:  The heart rate on single channel ECG was predominantly between 100-130 BPM.    EEG Classification / Summary:    Abnormal  EEG in the awake / drowsy / asleep state(s).    - Background slowing, generalized, mild     Clinical Impression:    Mild diffuse / multifocal cerebral dysfunction.   There were no epileptiform abnormalities recorded.      Nelson Dove M.D.   Epilepsy fellow    -------------------------------------------------------------------------------------------------------  [facility] EEG Reading Room Ph#: [phone]  Epilepsy Answering Service after 5PM and before 8:30AM: Ph#: [phone]      This is a preliminary report  EDUARDO REAL [facility]-[id number]     Study Start Date:  12-12-22 0949  Study End Date:  12-13-22 0800  Duration x Hours:  22 hr 11 min   --------------------------------------------------------------------------------------------------    History:  CC/ HPI Patient is a 62y old  Male who presents with a chief complaint of Acute cholecystitis, gallstone pancreatitis, ams right gaze preference  (13 Dec 2022 08:51)    MEDICATIONS  (STANDING):  aMIOdarone    Tablet   Oral   aMIOdarone    Tablet 400 milliGRAM(s) Oral every 8 hours  atorvastatin 80 milliGRAM(s) Oral at bedtime  chlorhexidine 0.12% Liquid 15 milliLiter(s) Oral Mucosa every 12 hours  chlorhexidine 2% Cloths 1 Application(s) Topical <User Schedule>  CRRT Treatment    <Continuous>  dexMEDEtomidine Infusion 0.3 MICROgram(s)/kG/Hr (6.97 mL/Hr) IV Continuous <Continuous>  fluconAZOLE IVPB      fluconAZOLE IVPB 200 milliGRAM(s) IV Intermittent every 24 hours  heparin  Infusion 800 Unit(s)/Hr (16.5 mL/Hr) IV Continuous <Continuous>  insulin lispro (ADMELOG) corrective regimen sliding scale   SubCutaneous every 4 hours  meropenem  IVPB 1000 milliGRAM(s) IV Intermittent every 12 hours  milrinone Infusion 0.1 MICROgram(s)/kG/Min (2.79 mL/Hr) IV Continuous <Continuous>  multivitamin/minerals/iron Oral Solution (CENTRUM) 15 milliLiter(s) Oral daily  pantoprazole  Injectable 40 milliGRAM(s) IV Push daily  Phoxillum Filtration BK 4 / 2.5 5000 milliLiter(s) (2200 mL/Hr) CRRT <Continuous>  Phoxillum Filtration BK 4 / 2.5 5000 milliLiter(s) (800 mL/Hr) CRRT <Continuous>  polyethylene glycol 3350 17 Gram(s) Oral two times a day  PrismaSOL Filtration BGK 4 / 2.5 5000 milliLiter(s) (200 mL/Hr) CRRT <Continuous>  senna 2 Tablet(s) Oral at bedtime  sodium chloride 0.9%. 1000 milliLiter(s) (5 mL/Hr) IV Continuous <Continuous>  thiamine 100 milliGRAM(s) Enteral Tube daily  vancomycin  IVPB 1000 milliGRAM(s) IV Intermittent every 24 hours      --------------------------------------------------------------------------------------------------  Study Interpretation:    [[[Abbreviation Key:  PDR=alpha rhythm/posterior dominant rhythm. A-P=anterior posterior.  Amplitude: ‘very low’:<20; ‘low’:20-49; ‘medium’:; ‘high’:>150uV.  Persistence for periodic/rhythmic patterns (% of epoch) ‘rare’:<1%; ‘occasional’:1-10%; ‘frequent’:10-50%; ‘abundant’:50-90%; ‘continuous’:>90%.  Persistence for sporadic discharges: ‘rare’:<1/hr; ‘occasional’:1/min-1/hr; ‘frequent’:>1/min; ‘abundant’:>1/10 sec.  RPP=rhythmic and periodic patterns; GRDA=generalized rhythmic delta activity; FIRDA=frontal intermittent GRDA; LRDA=lateralized rhythmic delta activity; TIRDA=temporal intermittent rhythmic delta activity;  LPD=PLED=lateralized periodic discharges; GPD=generalized periodic discharges; BIPDs =bilateral independent periodic discharges; Mf=multifocal; SIRPDs=stimulus induced rhythmic, periodic, or ictal appearing discharges; BIRDs=brief potentially ictal rhythmic discharges >4 Hz, lasting .5-10s; PFA (paroxysmal bursts >13 Hz or =8 Hz <10s).  Modifiers: +F=with fast component; +S=with spike component; +R=with rhythmic component.  S-B=burst suppression pattern.  Max=maximal. N1-drowsy; N2-stage II sleep; N3-slow wave sleep. SSS/BETS=small sharp spikes/benign epileptiform transients of sleep. HV=hyperventilation; PS=photic stimulation]]]    Daily EEG Visual Analysis    FINDINGS:      Background:  Continuous: continuous  Symmetry: symmetric  PDR: 8 Hz activity poorly modulated, with amplitude to 30 uV, that attenuated to eye opening.  Low amplitude frontal beta noted in wakefulness.  Reactivity: present  Voltage: normal, mostly 20-150uV  Anterior Posterior Gradient: present  Other background findings: none  Breach: absent    Background Slowing:  Generalized slowing: Intermittent diffuse theta and polymorphic delta slowing.   Focal slowing: none was present.    State Changes:   -Drowsiness noted with increased slowing, attenuation of fast activity.   -Present with N2 sleep transients with symmetric spindles and K-complexes.    Sporadic Epileptiform Discharges:    None    Rhythmic and Periodic Patterns (RPPs):  None     Electrographic and Electroclinical seizures:  None    Other Clinical Events:  None    Activation Procedures:   -Hyperventilation was not performed.    -Photic stimulation was not performed.     Artifacts:  Intermittent myogenic and movement artifacts were noted.    ECG:  The heart rate on single channel ECG was predominantly between 100-130 BPM.    EEG Classification / Summary:    Abnormal  EEG in the awake / drowsy / asleep state(s).    - Background slowing, generalized, mild     Clinical Impression:    Mild diffuse / multifocal cerebral dysfunction.   There were no epileptiform abnormalities recorded.      Nelson Dove M.D.   Epilepsy fellow    -------------------------------------------------------------------------------------------------------  [facility] EEG Reading Room Ph#: [phone]  Epilepsy Answering Service after 5PM and before 8:30AM: Ph#: [phone]      This is a preliminary report  EDUARDO REAL [facility]-[id number]     Study Start Date:  12-12-22 0949  Study End Date:  12-13-22 0800  Duration x Hours:  22 hr 11 min   --------------------------------------------------------------------------------------------------    History:  CC/ HPI Patient is a 62y old  Male who presents with a chief complaint of Acute cholecystitis, gallstone pancreatitis, ams right gaze preference  (13 Dec 2022 08:51)    MEDICATIONS  (STANDING):  aMIOdarone    Tablet   Oral   aMIOdarone    Tablet 400 milliGRAM(s) Oral every 8 hours  atorvastatin 80 milliGRAM(s) Oral at bedtime  CRRT Treatment    <Continuous>  dexMEDEtomidine Infusion 0.3 MICROgram(s)/kG/Hr (6.97 mL/Hr) IV Continuous <Continuous>  fluconAZOLE IVPB 200 milliGRAM(s) IV Intermittent every 24 hours  heparin  Infusion 800 Unit(s)/Hr (16.5 mL/Hr) IV Continuous <Continuous>  insulin lispro (ADMELOG) corrective regimen sliding scale   SubCutaneous every 4 hours  meropenem  IVPB 1000 milliGRAM(s) IV Intermittent every 12 hours  milrinone Infusion 0.1 MICROgram(s)/kG/Min (2.79 mL/Hr) IV Continuous <Continuous>  multivitamin/minerals/iron Oral Solution (CENTRUM) 15 milliLiter(s) Oral daily  pantoprazole  Injectable 40 milliGRAM(s) IV Push daily  Phoxillum Filtration BK 4 / 2.5 5000 milliLiter(s) (2200 mL/Hr) CRRT <Continuous>  Phoxillum Filtration BK 4 / 2.5 5000 milliLiter(s) (800 mL/Hr) CRRT <Continuous>  polyethylene glycol 3350 17 Gram(s) Oral two times a day  PrismaSOL Filtration BGK 4 / 2.5 5000 milliLiter(s) (200 mL/Hr) CRRT <Continuous>  senna 2 Tablet(s) Oral at bedtime  sodium chloride 0.9%. 1000 milliLiter(s) (5 mL/Hr) IV Continuous <Continuous>  thiamine 100 milliGRAM(s) Enteral Tube daily  vancomycin  IVPB 1000 milliGRAM(s) IV Intermittent every 24 hours      --------------------------------------------------------------------------------------------------  Study Interpretation:    [[[Abbreviation Key:  PDR=alpha rhythm/posterior dominant rhythm. A-P=anterior posterior.  Amplitude: ‘very low’:<20; ‘low’:20-49; ‘medium’:; ‘high’:>150uV.  Persistence for periodic/rhythmic patterns (% of epoch) ‘rare’:<1%; ‘occasional’:1-10%; ‘frequent’:10-50%; ‘abundant’:50-90%; ‘continuous’:>90%.  Persistence for sporadic discharges: ‘rare’:<1/hr; ‘occasional’:1/min-1/hr; ‘frequent’:>1/min; ‘abundant’:>1/10 sec.  RPP=rhythmic and periodic patterns; GRDA=generalized rhythmic delta activity; FIRDA=frontal intermittent GRDA; LRDA=lateralized rhythmic delta activity; TIRDA=temporal intermittent rhythmic delta activity;  LPD=PLED=lateralized periodic discharges; GPD=generalized periodic discharges; BIPDs =bilateral independent periodic discharges; Mf=multifocal; SIRPDs=stimulus induced rhythmic, periodic, or ictal appearing discharges; BIRDs=brief potentially ictal rhythmic discharges >4 Hz, lasting .5-10s; PFA (paroxysmal bursts >13 Hz or =8 Hz <10s).  Modifiers: +F=with fast component; +S=with spike component; +R=with rhythmic component.  S-B=burst suppression pattern.  Max=maximal. N1-drowsy; N2-stage II sleep; N3-slow wave sleep. SSS/BETS=small sharp spikes/benign epileptiform transients of sleep. HV=hyperventilation; PS=photic stimulation]]]    Daily EEG Visual Analysis    FINDINGS:      Background:  Continuous: continuous  Symmetry: symmetric  PDR: 8 Hz activity poorly modulated, with amplitude to 30 uV, that attenuated to eye opening.  Low amplitude frontal beta noted in wakefulness.  Reactivity: present  Voltage: normal, mostly 20-150uV  Anterior Posterior Gradient: present  Other background findings: none  Breach: absent    Background Slowing:  Generalized slowing: Intermittent diffuse theta and polymorphic delta slowing.   Focal slowing: none was present.    State Changes:   -Drowsiness noted with increased slowing, attenuation of fast activity.   -Present with N2 sleep transients with symmetric spindles and K-complexes.    Sporadic Epileptiform Discharges:    None    Rhythmic and Periodic Patterns (RPPs):  None     Electrographic and Electroclinical seizures:  None    Other Clinical Events:  None    Activation Procedures:   -Hyperventilation was not performed.    -Photic stimulation was not performed.     Artifacts:  Intermittent myogenic and movement artifacts were noted.    ECG:  The heart rate on single channel ECG was predominantly between 100-130 BPM.    EEG Classification / Summary:  Abnormal  EEG in the awake / drowsy / asleep state(s).  Background slowing, generalized, mild     Clinical Impression:  Mild diffuse / multifocal cerebral dysfunction.   There were no epileptiform abnormalities recorded.      Nelson Dove M.D.   Epilepsy fellow    -------------------------------------------------------------------------------------------------------  [facility] EEG Reading Room Ph#: [phone]  Epilepsy Answering Service after 5PM and before 8:30AM: Ph#: [phone] EDUARDO REAL [facility]-[id number]     Study Start Date:  12-12-22 0949  Study End Date:  12-13-22 0800  Duration x Hours:  22 hr 11 min   --------------------------------------------------------------------------------------------------    History:  CC/ HPI Patient is a 62y old  Male who presents with a chief complaint of Acute cholecystitis, gallstone pancreatitis, ams right gaze preference  (13 Dec 2022 08:51)    MEDICATIONS  (STANDING):  aMIOdarone    Tablet   Oral   aMIOdarone    Tablet 400 milliGRAM(s) Oral every 8 hours  atorvastatin 80 milliGRAM(s) Oral at bedtime  CRRT Treatment    <Continuous>  dexMEDEtomidine Infusion 0.3 MICROgram(s)/kG/Hr (6.97 mL/Hr) IV Continuous <Continuous>  fluconAZOLE IVPB 200 milliGRAM(s) IV Intermittent every 24 hours  heparin  Infusion 800 Unit(s)/Hr (16.5 mL/Hr) IV Continuous <Continuous>  insulin lispro (ADMELOG) corrective regimen sliding scale   SubCutaneous every 4 hours  meropenem  IVPB 1000 milliGRAM(s) IV Intermittent every 12 hours  milrinone Infusion 0.1 MICROgram(s)/kG/Min (2.79 mL/Hr) IV Continuous <Continuous>  multivitamin/minerals/iron Oral Solution (CENTRUM) 15 milliLiter(s) Oral daily  pantoprazole  Injectable 40 milliGRAM(s) IV Push daily  Phoxillum Filtration BK 4 / 2.5 5000 milliLiter(s) (2200 mL/Hr) CRRT <Continuous>  Phoxillum Filtration BK 4 / 2.5 5000 milliLiter(s) (800 mL/Hr) CRRT <Continuous>  polyethylene glycol 3350 17 Gram(s) Oral two times a day  PrismaSOL Filtration BGK 4 / 2.5 5000 milliLiter(s) (200 mL/Hr) CRRT <Continuous>  senna 2 Tablet(s) Oral at bedtime  sodium chloride 0.9%. 1000 milliLiter(s) (5 mL/Hr) IV Continuous <Continuous>  thiamine 100 milliGRAM(s) Enteral Tube daily  vancomycin  IVPB 1000 milliGRAM(s) IV Intermittent every 24 hours      --------------------------------------------------------------------------------------------------  Study Interpretation:    [[[Abbreviation Key:  PDR=alpha rhythm/posterior dominant rhythm. A-P=anterior posterior.  Amplitude: ‘very low’:<20; ‘low’:20-49; ‘medium’:; ‘high’:>150uV.  Persistence for periodic/rhythmic patterns (% of epoch) ‘rare’:<1%; ‘occasional’:1-10%; ‘frequent’:10-50%; ‘abundant’:50-90%; ‘continuous’:>90%.  Persistence for sporadic discharges: ‘rare’:<1/hr; ‘occasional’:1/min-1/hr; ‘frequent’:>1/min; ‘abundant’:>1/10 sec.  RPP=rhythmic and periodic patterns; GRDA=generalized rhythmic delta activity; FIRDA=frontal intermittent GRDA; LRDA=lateralized rhythmic delta activity; TIRDA=temporal intermittent rhythmic delta activity;  LPD=PLED=lateralized periodic discharges; GPD=generalized periodic discharges; BIPDs =bilateral independent periodic discharges; Mf=multifocal; SIRPDs=stimulus induced rhythmic, periodic, or ictal appearing discharges; BIRDs=brief potentially ictal rhythmic discharges >4 Hz, lasting .5-10s; PFA (paroxysmal bursts >13 Hz or =8 Hz <10s).  Modifiers: +F=with fast component; +S=with spike component; +R=with rhythmic component.  S-B=burst suppression pattern.  Max=maximal. N1-drowsy; N2-stage II sleep; N3-slow wave sleep. SSS/BETS=small sharp spikes/benign epileptiform transients of sleep. HV=hyperventilation; PS=photic stimulation]]]    Daily EEG Visual Analysis    FINDINGS:      Background:  Continuous: continuous  Symmetry: symmetric  PDR: 8 Hz activity poorly modulated, with amplitude to 30 uV, that attenuated to eye opening.  Low amplitude frontal beta noted in wakefulness.  Reactivity: present  Voltage: normal, mostly 20-150uV  Anterior Posterior Gradient: present  Other background findings: none  Breach: absent    Background Slowing:  Generalized slowing: Intermittent diffuse theta and polymorphic delta slowing.   Focal slowing: none was present.    State Changes:   -Drowsiness noted with increased slowing, attenuation of fast activity.   -Present with N2 sleep transients with symmetric spindles and K-complexes.    Sporadic Epileptiform Discharges:    None    Rhythmic and Periodic Patterns (RPPs):  None     Electrographic and Electroclinical seizures:  None    Other Clinical Events:  None    Activation Procedures:   -Hyperventilation was not performed.    -Photic stimulation was not performed.     Artifacts:  Intermittent myogenic and movement artifacts were noted.    ECG:  The heart rate on single channel ECG was predominantly between 100-130 BPM.    EEG Classification / Summary:  Abnormal  EEG in the awake / drowsy / asleep state(s).  Background slowing, generalized, mild     Clinical Impression:  Mild diffuse / multifocal cerebral dysfunction.   There were no epileptiform abnormalities recorded.      Nelson Dove M.D.   Epilepsy fellow    -------------------------------------------------------------------------------------------------------  [facility] EEG Reading Room Ph#: [phone]  Epilepsy Answering Service after 5PM and before 8:30AM: Ph#: [phone] EDUARDO REAL [facility]-[id number]     Study Start Date:  12-12-22 0949  Study End Date:  12-13-22 0800  Duration x Hours:  22 hr 11 min   --------------------------------------------------------------------------------------------------    History:  CC/ HPI Patient is a 62y old  Male who presents with a chief complaint of Acute cholecystitis, gallstone pancreatitis, ams right gaze preference  (13 Dec 2022 08:51)    MEDICATIONS  (STANDING):  aMIOdarone    Tablet   Oral   aMIOdarone    Tablet 400 milliGRAM(s) Oral every 8 hours  atorvastatin 80 milliGRAM(s) Oral at bedtime  CRRT Treatment    <Continuous>  dexMEDEtomidine Infusion 0.3 MICROgram(s)/kG/Hr (6.97 mL/Hr) IV Continuous <Continuous>  fluconAZOLE IVPB 200 milliGRAM(s) IV Intermittent every 24 hours  heparin  Infusion 800 Unit(s)/Hr (16.5 mL/Hr) IV Continuous <Continuous>  insulin lispro (ADMELOG) corrective regimen sliding scale   SubCutaneous every 4 hours  meropenem  IVPB 1000 milliGRAM(s) IV Intermittent every 12 hours  milrinone Infusion 0.1 MICROgram(s)/kG/Min (2.79 mL/Hr) IV Continuous <Continuous>  multivitamin/minerals/iron Oral Solution (CENTRUM) 15 milliLiter(s) Oral daily  pantoprazole  Injectable 40 milliGRAM(s) IV Push daily  Phoxillum Filtration BK 4 / 2.5 5000 milliLiter(s) (2200 mL/Hr) CRRT <Continuous>  Phoxillum Filtration BK 4 / 2.5 5000 milliLiter(s) (800 mL/Hr) CRRT <Continuous>  polyethylene glycol 3350 17 Gram(s) Oral two times a day  PrismaSOL Filtration BGK 4 / 2.5 5000 milliLiter(s) (200 mL/Hr) CRRT <Continuous>  senna 2 Tablet(s) Oral at bedtime  sodium chloride 0.9%. 1000 milliLiter(s) (5 mL/Hr) IV Continuous <Continuous>  thiamine 100 milliGRAM(s) Enteral Tube daily  vancomycin  IVPB 1000 milliGRAM(s) IV Intermittent every 24 hours      --------------------------------------------------------------------------------------------------  Study Interpretation:    [[[Abbreviation Key:  PDR=alpha rhythm/posterior dominant rhythm. A-P=anterior posterior.  Amplitude: ‘very low’:<20; ‘low’:20-49; ‘medium’:; ‘high’:>150uV.  Persistence for periodic/rhythmic patterns (% of epoch) ‘rare’:<1%; ‘occasional’:1-10%; ‘frequent’:10-50%; ‘abundant’:50-90%; ‘continuous’:>90%.  Persistence for sporadic discharges: ‘rare’:<1/hr; ‘occasional’:1/min-1/hr; ‘frequent’:>1/min; ‘abundant’:>1/10 sec.  RPP=rhythmic and periodic patterns; GRDA=generalized rhythmic delta activity; FIRDA=frontal intermittent GRDA; LRDA=lateralized rhythmic delta activity; TIRDA=temporal intermittent rhythmic delta activity;  LPD=PLED=lateralized periodic discharges; GPD=generalized periodic discharges; BIPDs =bilateral independent periodic discharges; Mf=multifocal; SIRPDs=stimulus induced rhythmic, periodic, or ictal appearing discharges; BIRDs=brief potentially ictal rhythmic discharges >4 Hz, lasting .5-10s; PFA (paroxysmal bursts >13 Hz or =8 Hz <10s).  Modifiers: +F=with fast component; +S=with spike component; +R=with rhythmic component.  S-B=burst suppression pattern.  Max=maximal. N1-drowsy; N2-stage II sleep; N3-slow wave sleep. SSS/BETS=small sharp spikes/benign epileptiform transients of sleep. HV=hyperventilation; PS=photic stimulation]]]    Daily EEG Visual Analysis    FINDINGS:      Background:  Continuous: continuous  Symmetry: symmetric  PDR: 8 Hz activity poorly modulated, with amplitude to 30 uV, that attenuated to eye opening.  Low amplitude frontal beta noted in wakefulness.  Reactivity: present  Voltage: normal, mostly 20-150uV  Anterior Posterior Gradient: present  Other background findings: none  Breach: absent    Background Slowing:  Generalized slowing: Intermittent diffuse theta and polymorphic delta slowing.   Focal slowing: none was present.    State Changes:   -Drowsiness noted with increased slowing, attenuation of fast activity.   -Present with N2 sleep transients with symmetric spindles and K-complexes.    Sporadic Epileptiform Discharges:    None    Rhythmic and Periodic Patterns (RPPs):  None     Electrographic and Electroclinical seizures:  None    Other Clinical Events:  None    Activation Procedures:   -Hyperventilation was not performed.    -Photic stimulation was not performed.     Artifacts:  Intermittent myogenic and movement artifacts were noted.    ECG:  The heart rate on single channel ECG was predominantly between 100-130 BPM.    EEG Classification / Summary:  Abnormal  EEG in the awake / drowsy / asleep state(s).  Background slowing, generalized, mild     Clinical Impression:  Mild diffuse / multifocal cerebral dysfunction.   There were no epileptiform abnormalities recorded.      Nelson Dove M.D.   Epilepsy fellow    -------------------------------------------------------------------------------------------------------  [facility] EEG Reading Room Ph#: [phone]  Epilepsy Answering Service after 5PM and before 8:30AM: Ph#: [phone] EDUARDO REAL [facility]-[id number]     Study Start Date:  12-12-22 0949  Study End Date:  12-13-22 1040  Duration x Hours:  24 hr 51 min   --------------------------------------------------------------------------------------------------    History:  CC/ HPI Patient is a 62y old  Male who presents with a chief complaint of Acute cholecystitis, gallstone pancreatitis, ams right gaze preference  (13 Dec 2022 08:51)    MEDICATIONS  (STANDING):  aMIOdarone    Tablet   Oral   aMIOdarone    Tablet 400 milliGRAM(s) Oral every 8 hours  atorvastatin 80 milliGRAM(s) Oral at bedtime  CRRT Treatment    <Continuous>  dexMEDEtomidine Infusion 0.3 MICROgram(s)/kG/Hr (6.97 mL/Hr) IV Continuous <Continuous>  fluconAZOLE IVPB 200 milliGRAM(s) IV Intermittent every 24 hours  heparin  Infusion 800 Unit(s)/Hr (16.5 mL/Hr) IV Continuous <Continuous>  insulin lispro (ADMELOG) corrective regimen sliding scale   SubCutaneous every 4 hours  meropenem  IVPB 1000 milliGRAM(s) IV Intermittent every 12 hours  milrinone Infusion 0.1 MICROgram(s)/kG/Min (2.79 mL/Hr) IV Continuous <Continuous>  multivitamin/minerals/iron Oral Solution (CENTRUM) 15 milliLiter(s) Oral daily  pantoprazole  Injectable 40 milliGRAM(s) IV Push daily  Phoxillum Filtration BK 4 / 2.5 5000 milliLiter(s) (2200 mL/Hr) CRRT <Continuous>  Phoxillum Filtration BK 4 / 2.5 5000 milliLiter(s) (800 mL/Hr) CRRT <Continuous>  polyethylene glycol 3350 17 Gram(s) Oral two times a day  PrismaSOL Filtration BGK 4 / 2.5 5000 milliLiter(s) (200 mL/Hr) CRRT <Continuous>  senna 2 Tablet(s) Oral at bedtime  sodium chloride 0.9%. 1000 milliLiter(s) (5 mL/Hr) IV Continuous <Continuous>  thiamine 100 milliGRAM(s) Enteral Tube daily  vancomycin  IVPB 1000 milliGRAM(s) IV Intermittent every 24 hours      --------------------------------------------------------------------------------------------------  Study Interpretation:    [[[Abbreviation Key:  PDR=alpha rhythm/posterior dominant rhythm. A-P=anterior posterior.  Amplitude: ‘very low’:<20; ‘low’:20-49; ‘medium’:; ‘high’:>150uV.  Persistence for periodic/rhythmic patterns (% of epoch) ‘rare’:<1%; ‘occasional’:1-10%; ‘frequent’:10-50%; ‘abundant’:50-90%; ‘continuous’:>90%.  Persistence for sporadic discharges: ‘rare’:<1/hr; ‘occasional’:1/min-1/hr; ‘frequent’:>1/min; ‘abundant’:>1/10 sec.  RPP=rhythmic and periodic patterns; GRDA=generalized rhythmic delta activity; FIRDA=frontal intermittent GRDA; LRDA=lateralized rhythmic delta activity; TIRDA=temporal intermittent rhythmic delta activity;  LPD=PLED=lateralized periodic discharges; GPD=generalized periodic discharges; BIPDs =bilateral independent periodic discharges; Mf=multifocal; SIRPDs=stimulus induced rhythmic, periodic, or ictal appearing discharges; BIRDs=brief potentially ictal rhythmic discharges >4 Hz, lasting .5-10s; PFA (paroxysmal bursts >13 Hz or =8 Hz <10s).  Modifiers: +F=with fast component; +S=with spike component; +R=with rhythmic component.  S-B=burst suppression pattern.  Max=maximal. N1-drowsy; N2-stage II sleep; N3-slow wave sleep. SSS/BETS=small sharp spikes/benign epileptiform transients of sleep. HV=hyperventilation; PS=photic stimulation]]]    Daily EEG Visual Analysis    FINDINGS:      Background:  Continuous: continuous  Symmetry: symmetric  PDR: 8 Hz activity poorly modulated, with amplitude to 30 uV, that attenuated to eye opening.  Low amplitude frontal beta noted in wakefulness.  Reactivity: present  Voltage: normal, mostly 20-150uV  Anterior Posterior Gradient: present  Other background findings: none  Breach: absent    Background Slowing:  Generalized slowing: Intermittent diffuse theta and polymorphic delta slowing.   Focal slowing: none was present.    State Changes:   -Drowsiness noted with increased slowing, attenuation of fast activity.   -Present with N2 sleep transients with symmetric spindles and K-complexes.    Sporadic Epileptiform Discharges:    None    Rhythmic and Periodic Patterns (RPPs):  None     Electrographic and Electroclinical seizures:  None    Other Clinical Events:  None    Activation Procedures:   -Hyperventilation was not performed.    -Photic stimulation was not performed.     Artifacts:  Intermittent myogenic and movement artifacts were noted.    ECG:  The heart rate on single channel ECG was predominantly between 100-130 BPM.    EEG Classification / Summary:  Abnormal  EEG in the awake / drowsy / asleep state(s).  Background slowing, generalized, mild     Clinical Impression:  Mild diffuse / multifocal cerebral dysfunction.   There were no epileptiform abnormalities recorded.      Nelson Dove M.D.   Epilepsy fellow    -------------------------------------------------------------------------------------------------------  [facility] EEG Reading Room Ph#: [phone]  Epilepsy Answering Service after 5PM and before 8:30AM: Ph#: [phone] EDUARDO REAL [facility]-[id number]     Study Start Date:  12-12-22 0949  Study End Date:  12-13-22 1040  Duration x Hours:  24 hr 51 min   --------------------------------------------------------------------------------------------------    History:  CC/ HPI Patient is a 62y old  Male who presents with a chief complaint of Acute cholecystitis, gallstone pancreatitis, ams right gaze preference  (13 Dec 2022 08:51)    MEDICATIONS  (STANDING):  aMIOdarone    Tablet   Oral   aMIOdarone    Tablet 400 milliGRAM(s) Oral every 8 hours  atorvastatin 80 milliGRAM(s) Oral at bedtime  CRRT Treatment    <Continuous>  dexMEDEtomidine Infusion 0.3 MICROgram(s)/kG/Hr (6.97 mL/Hr) IV Continuous <Continuous>  fluconAZOLE IVPB 200 milliGRAM(s) IV Intermittent every 24 hours  heparin  Infusion 800 Unit(s)/Hr (16.5 mL/Hr) IV Continuous <Continuous>  insulin lispro (ADMELOG) corrective regimen sliding scale   SubCutaneous every 4 hours  meropenem  IVPB 1000 milliGRAM(s) IV Intermittent every 12 hours  milrinone Infusion 0.1 MICROgram(s)/kG/Min (2.79 mL/Hr) IV Continuous <Continuous>  multivitamin/minerals/iron Oral Solution (CENTRUM) 15 milliLiter(s) Oral daily  pantoprazole  Injectable 40 milliGRAM(s) IV Push daily  Phoxillum Filtration BK 4 / 2.5 5000 milliLiter(s) (2200 mL/Hr) CRRT <Continuous>  Phoxillum Filtration BK 4 / 2.5 5000 milliLiter(s) (800 mL/Hr) CRRT <Continuous>  polyethylene glycol 3350 17 Gram(s) Oral two times a day  PrismaSOL Filtration BGK 4 / 2.5 5000 milliLiter(s) (200 mL/Hr) CRRT <Continuous>  senna 2 Tablet(s) Oral at bedtime  sodium chloride 0.9%. 1000 milliLiter(s) (5 mL/Hr) IV Continuous <Continuous>  thiamine 100 milliGRAM(s) Enteral Tube daily  vancomycin  IVPB 1000 milliGRAM(s) IV Intermittent every 24 hours      --------------------------------------------------------------------------------------------------  Study Interpretation:    [[[Abbreviation Key:  PDR=alpha rhythm/posterior dominant rhythm. A-P=anterior posterior.  Amplitude: ‘very low’:<20; ‘low’:20-49; ‘medium’:; ‘high’:>150uV.  Persistence for periodic/rhythmic patterns (% of epoch) ‘rare’:<1%; ‘occasional’:1-10%; ‘frequent’:10-50%; ‘abundant’:50-90%; ‘continuous’:>90%.  Persistence for sporadic discharges: ‘rare’:<1/hr; ‘occasional’:1/min-1/hr; ‘frequent’:>1/min; ‘abundant’:>1/10 sec.  RPP=rhythmic and periodic patterns; GRDA=generalized rhythmic delta activity; FIRDA=frontal intermittent GRDA; LRDA=lateralized rhythmic delta activity; TIRDA=temporal intermittent rhythmic delta activity;  LPD=PLED=lateralized periodic discharges; GPD=generalized periodic discharges; BIPDs =bilateral independent periodic discharges; Mf=multifocal; SIRPDs=stimulus induced rhythmic, periodic, or ictal appearing discharges; BIRDs=brief potentially ictal rhythmic discharges >4 Hz, lasting .5-10s; PFA (paroxysmal bursts >13 Hz or =8 Hz <10s).  Modifiers: +F=with fast component; +S=with spike component; +R=with rhythmic component.  S-B=burst suppression pattern.  Max=maximal. N1-drowsy; N2-stage II sleep; N3-slow wave sleep. SSS/BETS=small sharp spikes/benign epileptiform transients of sleep. HV=hyperventilation; PS=photic stimulation]]]    Daily EEG Visual Analysis    FINDINGS:      Background:  Continuous: continuous  Symmetry: symmetric  PDR: 8 Hz activity poorly modulated, with amplitude to 30 uV, that attenuated to eye opening.  Low amplitude frontal beta noted in wakefulness.  Reactivity: present  Voltage: normal, mostly 20-150uV  Anterior Posterior Gradient: present  Other background findings: none  Breach: absent    Background Slowing:  Generalized slowing: Intermittent diffuse theta and polymorphic delta slowing.   Focal slowing: none was present.    State Changes:   -Drowsiness noted with increased slowing, attenuation of fast activity.   -Present with N2 sleep transients with symmetric spindles and K-complexes.    Sporadic Epileptiform Discharges:    None    Rhythmic and Periodic Patterns (RPPs):  None     Electrographic and Electroclinical seizures:  None    Other Clinical Events:  None    Activation Procedures:   -Hyperventilation was not performed.    -Photic stimulation was not performed.     Artifacts:  Intermittent myogenic and movement artifacts were noted.    ECG:  The heart rate on single channel ECG was predominantly between 100-130 BPM.    EEG Classification / Summary:  Abnormal  EEG in the awake / drowsy / asleep state(s).  Background slowing, generalized, mild     Clinical Impression:  Mild diffuse / multifocal cerebral dysfunction.   There were no epileptiform abnormalities recorded.      Nelson Dove M.D.   Epilepsy fellow    -------------------------------------------------------------------------------------------------------  [facility] EEG Reading Room Ph#: [phone]  Epilepsy Answering Service after 5PM and before 8:30AM: Ph#: [phone] EDUARDO REAL [facility]-[id number]     Study Start Date:  12-12-22 0949  Study End Date:  12-13-22 1040  Duration x Hours:  24 hr 51 min   --------------------------------------------------------------------------------------------------    History:  CC/ HPI Patient is a 62y old  Male who presents with a chief complaint of Acute cholecystitis, gallstone pancreatitis, ams right gaze preference  (13 Dec 2022 08:51)    MEDICATIONS  (STANDING):  aMIOdarone    Tablet   Oral   aMIOdarone    Tablet 400 milliGRAM(s) Oral every 8 hours  atorvastatin 80 milliGRAM(s) Oral at bedtime  CRRT Treatment    <Continuous>  dexMEDEtomidine Infusion 0.3 MICROgram(s)/kG/Hr (6.97 mL/Hr) IV Continuous <Continuous>  fluconAZOLE IVPB 200 milliGRAM(s) IV Intermittent every 24 hours  heparin  Infusion 800 Unit(s)/Hr (16.5 mL/Hr) IV Continuous <Continuous>  insulin lispro (ADMELOG) corrective regimen sliding scale   SubCutaneous every 4 hours  meropenem  IVPB 1000 milliGRAM(s) IV Intermittent every 12 hours  milrinone Infusion 0.1 MICROgram(s)/kG/Min (2.79 mL/Hr) IV Continuous <Continuous>  multivitamin/minerals/iron Oral Solution (CENTRUM) 15 milliLiter(s) Oral daily  pantoprazole  Injectable 40 milliGRAM(s) IV Push daily  Phoxillum Filtration BK 4 / 2.5 5000 milliLiter(s) (2200 mL/Hr) CRRT <Continuous>  Phoxillum Filtration BK 4 / 2.5 5000 milliLiter(s) (800 mL/Hr) CRRT <Continuous>  polyethylene glycol 3350 17 Gram(s) Oral two times a day  PrismaSOL Filtration BGK 4 / 2.5 5000 milliLiter(s) (200 mL/Hr) CRRT <Continuous>  senna 2 Tablet(s) Oral at bedtime  sodium chloride 0.9%. 1000 milliLiter(s) (5 mL/Hr) IV Continuous <Continuous>  thiamine 100 milliGRAM(s) Enteral Tube daily  vancomycin  IVPB 1000 milliGRAM(s) IV Intermittent every 24 hours      --------------------------------------------------------------------------------------------------  Study Interpretation:    [[[Abbreviation Key:  PDR=alpha rhythm/posterior dominant rhythm. A-P=anterior posterior.  Amplitude: ‘very low’:<20; ‘low’:20-49; ‘medium’:; ‘high’:>150uV.  Persistence for periodic/rhythmic patterns (% of epoch) ‘rare’:<1%; ‘occasional’:1-10%; ‘frequent’:10-50%; ‘abundant’:50-90%; ‘continuous’:>90%.  Persistence for sporadic discharges: ‘rare’:<1/hr; ‘occasional’:1/min-1/hr; ‘frequent’:>1/min; ‘abundant’:>1/10 sec.  RPP=rhythmic and periodic patterns; GRDA=generalized rhythmic delta activity; FIRDA=frontal intermittent GRDA; LRDA=lateralized rhythmic delta activity; TIRDA=temporal intermittent rhythmic delta activity;  LPD=PLED=lateralized periodic discharges; GPD=generalized periodic discharges; BIPDs =bilateral independent periodic discharges; Mf=multifocal; SIRPDs=stimulus induced rhythmic, periodic, or ictal appearing discharges; BIRDs=brief potentially ictal rhythmic discharges >4 Hz, lasting .5-10s; PFA (paroxysmal bursts >13 Hz or =8 Hz <10s).  Modifiers: +F=with fast component; +S=with spike component; +R=with rhythmic component.  S-B=burst suppression pattern.  Max=maximal. N1-drowsy; N2-stage II sleep; N3-slow wave sleep. SSS/BETS=small sharp spikes/benign epileptiform transients of sleep. HV=hyperventilation; PS=photic stimulation]]]    Daily EEG Visual Analysis    FINDINGS:      Background:  Continuous: continuous  Symmetry: symmetric  PDR: 8 Hz activity poorly modulated, with amplitude to 30 uV, that attenuated to eye opening.  Low amplitude frontal beta noted in wakefulness.  Reactivity: present  Voltage: normal, mostly 20-150uV  Anterior Posterior Gradient: present  Other background findings: none  Breach: absent    Background Slowing:  Generalized slowing: Intermittent diffuse theta and polymorphic delta slowing.   Focal slowing: none was present.    State Changes:   -Drowsiness noted with increased slowing, attenuation of fast activity.   -Present with N2 sleep transients with symmetric spindles and K-complexes.    Sporadic Epileptiform Discharges:    None    Rhythmic and Periodic Patterns (RPPs):  None     Electrographic and Electroclinical seizures:  None    Other Clinical Events:  None    Activation Procedures:   -Hyperventilation was not performed.    -Photic stimulation was not performed.     Artifacts:  Intermittent myogenic and movement artifacts were noted.    ECG:  The heart rate on single channel ECG was predominantly between 100-130 BPM.    EEG Classification / Summary:  Abnormal  EEG in the awake / drowsy / asleep state(s).  Background slowing, generalized, mild     Clinical Impression:  Mild diffuse / multifocal cerebral dysfunction.   There were no epileptiform abnormalities recorded.      Nelson Dove M.D.   Epilepsy fellow    -------------------------------------------------------------------------------------------------------  [facility] EEG Reading Room Ph#: [phone]  Epilepsy Answering Service after 5PM and before 8:30AM: Ph#: [phone]

## 2022-12-13 NOTE — PROGRESS NOTE ADULT - PROBLEM SELECTOR PLAN 1
GOC are defined. At this point, GOC are for trying to utilize all available Rxs so the patient's condition can improve.  He is now off of ECMO and the CTU team is working on trying to wean him off IBP and Mechanical ventilation. He is on CVVHD and his family understands that the patient may need HD. They also understand that, at this time, the only option is cardiac recovery once he is not a candidate for advanced therapies.     His wife, though being part of GOC discussions, desires his children to translate for her and wanted her daughter (who is an MD) to be the  for communication.     He is full code. GOC are defined. At this point, GOC are for trying to utilize all available Rxs so the patient's condition can improve.  He is now off of ECMO and the CTU team is working on trying to wean him off IBAP and Mechanical ventilation. He is on CVVHD and his family understands that the patient may need HD. They also understand that, at this time, the only option is cardiac recovery once he is not a candidate for advanced therapies.     His wife, though being part of GOC discussions, desires his children to translate for her and wanted her daughter (who is an MD) to be the  for communication.     He is full code.

## 2022-12-13 NOTE — PROGRESS NOTE ADULT - PROBLEM SELECTOR PLAN 1
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

## 2022-12-13 NOTE — PROGRESS NOTE ADULT - ASSESSMENT
Encephalopathy  Cardiac arrest  CAD/STEMI  DM type 2  HTN  Subdural hematoma (SDH)  Heart failure  Sepsis  Pancreatitis  ERIC  Possible stroke/ischemia in L MCA/PCA watershed territory    - Etiology for encephalopathy is broad and includes cerebrovascular (watershed area of ischemia, although only unilateral), toxic/metabolic, iatrogenic (was on cefepime), or even seizure. No clear focal neurologic deficits on exam. Preliminary EEG reading at bedside with very mild generalized slowing without epileptiform discharges or seizures. As of today (12/13) patient with significant improvement  - vEEG with mild generalized slowing but no evidence for focal slowing, epileptiform discharges, or seizures. STOPPED  - No AED's at this time  - MRI brain w/o if able (given IABP). Alternatively can do repeat CT head w/o in 24 hours (12/14) if this cannot be done  - Continue to address above medical problems, as you are doing  - Will continue to follow patient with you, as needed

## 2022-12-13 NOTE — PROGRESS NOTE ADULT - PROBLEM SELECTOR PLAN 8
- f/u ID recs, multiple potential sources given recent lines/procedures, as well as possible biliary source  - consider removing non-vital lines  - f/u pan cultures and c/t abx.

## 2022-12-13 NOTE — PROGRESS NOTE ADULT - SUBJECTIVE AND OBJECTIVE BOX
CARDIOLOGY CONSULT PROGRESS NOTE  EDUARDO REAL  [id number]    INTERVAL EVENTS:  - tele:   -     ROS:  Negative, except as above.    MEDICATIONS  (STANDING):  aMIOdarone    Tablet   Oral   aMIOdarone    Tablet 400 milliGRAM(s) Oral every 8 hours  atorvastatin 80 milliGRAM(s) Oral at bedtime  chlorhexidine 0.12% Liquid 15 milliLiter(s) Oral Mucosa every 12 hours  chlorhexidine 2% Cloths 1 Application(s) Topical <User Schedule>  CRRT Treatment    <Continuous>  dexMEDEtomidine Infusion 0.3 MICROgram(s)/kG/Hr (6.97 mL/Hr) IV Continuous <Continuous>  fluconAZOLE IVPB      fluconAZOLE IVPB 200 milliGRAM(s) IV Intermittent every 24 hours  heparin  Infusion 800 Unit(s)/Hr (16.5 mL/Hr) IV Continuous <Continuous>  insulin lispro (ADMELOG) corrective regimen sliding scale   SubCutaneous every 4 hours  meropenem  IVPB 1000 milliGRAM(s) IV Intermittent every 12 hours  milrinone Infusion 0.1 MICROgram(s)/kG/Min (2.79 mL/Hr) IV Continuous <Continuous>  multivitamin/minerals/iron Oral Solution (CENTRUM) 15 milliLiter(s) Oral daily  pantoprazole  Injectable 40 milliGRAM(s) IV Push daily  Phoxillum Filtration BK 4 / 2.5 5000 milliLiter(s) (1000 mL/Hr) CRRT <Continuous>  polyethylene glycol 3350 17 Gram(s) Oral two times a day  potassium chloride  10 mEq/50 mL IVPB 10 milliEquivalent(s) IV Intermittent every 1 hour  PrismaSATE Dialysate BK 0 / 3.5 5000 milliLiter(s) (2000 mL/Hr) CRRT <Continuous>  PrismaSOL Filtration BGK 4 / 2.5 5000 milliLiter(s) (200 mL/Hr) CRRT <Continuous>  senna 2 Tablet(s) Oral at bedtime  sodium chloride 0.9%. 1000 milliLiter(s) (5 mL/Hr) IV Continuous <Continuous>  thiamine 100 milliGRAM(s) Enteral Tube daily  vancomycin  IVPB 1000 milliGRAM(s) IV Intermittent every 24 hours    MEDICATIONS  (PRN):  acetaminophen    Suspension .. 650 milliGRAM(s) Enteral Tube every 6 hours PRN Temp greater or equal to 38C (100.4F), Mild Pain (1 - 3)    Allergies  No Known Allergies    P/E:  Adult Advanced Hemodynamics Last 24 Hrs  CVP(mm Hg): 9 (13 Dec 2022 07:00) (5 - 26)    Vital Signs Last 24 Hrs  T(C): 37.1 (13 Dec 2022 04:00), Max: 37.1 (13 Dec 2022 04:00)  T(F): 98.8 (13 Dec 2022 04:00), Max: 98.8 (13 Dec 2022 04:00)  HR: 104 (13 Dec 2022 07:00) (100 - 123)  RR: 18 (13 Dec 2022 07:00) (10 - 34)  SpO2: 100% (13 Dec 2022 07:00) (98% - 100%)    Patient On (Oxygen Delivery Method): ventilator  O2 Concentration (%): 40    Daily Weight in k.7 (13 Dec 2022 00:00)    I&O's Detail  12 Dec 2022 07:01  -  13 Dec 2022 07:00  IN:    Amiodarone: 100.2 mL    Dexmedetomidine: 133 mL    Enteral Tube Flush: 60 mL    Glucerna: 795 mL    Heparin: 331.5 mL    IV PiggyBack: 600 mL    Milrinone: 67.2 mL    sodium chloride 0.9%: 120 mL  Total IN: 2206.9 mL  OUT:    Indwelling Catheter - Urethral (mL): 235 mL    Other (mL): 2664 mL    Vasopressin: 0 mL  Total OUT: 2899 mL  Total NET: -692.1 mL    I&O's Summary  12 Dec 2022 07:01  -  13 Dec 2022 07:00  --------------------------------------------------------  IN: 2206.9 mL / OUT: 2899 mL / NET: -692.1 mL    Gen: intubated  laying in hospital bed, rousable and follows commands  HEENT: NCAT, ETT in place, MMM  CV: tachycardic; systolic murmur, S1/S2  Resp: mechanical BS  Abd: soft, NT, hypoactive BS  Ext:  WWP, PPP, IABP L groin, wound vac R groin    RELEVANT RECENT LABS/IMAGING/STUDIES:                        9.0    17.35 )-----------( 128      ( 13 Dec 2022 01:08 )             28.7     -    135  |  98  |  22  ----------------------------<  124<H>  3.3<L>   |  21<L>  |  1.38<H>    Ca    8.5      13 Dec 2022 01:08  Phos  3.5       Mg     2.1         TPro  6.1  /  Alb  2.6<L>  /  TBili  1.2  /  DBili  x   /  AST  39  /  ALT  15  /  AlkPhos  125<H>      LIVER FUNCTIONS - ( 13 Dec 2022 01:08 )  Alb: 2.6 g/dL / Pro: 6.1 g/dL / ALK PHOS: 125 U/L / ALT: 15 U/L / AST: 39 U/L / GGT: x           PT/INR - ( 13 Dec 2022 01:08 )   PT: 12.9 sec;   INR: 1.12 ratio       PTT - ( 13 Dec 2022 05:37 )  PTT:53.3 sec    ABG - ( 13 Dec 2022 00:00 )  pH, Arterial: 7.41  pH, Blood: x     /  pCO2: 35    /  pO2: 124   / HCO3: 22    / Base Excess: -2.1  /  SaO2: 98.3      Culture - Urine (collected 11 Dec 2022 00:26)  Source: Catheterized Catheterized  Final Report (12 Dec 2022 09:21):    No growth    Culture - Blood (collected 10 Dec 2022 15:45)  Source: .Blood Blood-Peripheral  Preliminary Report (11 Dec 2022 22:02):    No growth to date.    Culture - Blood (collected 10 Dec 2022 15:30)  Source: .Blood Blood-Peripheral  Preliminary Report (11 Dec 2022 22:02):    No growth to date.    Culture - Sputum (collected 10 Dec 2022 10:49)  Source: ET Tube ET Tube  Gram Stain (10 Dec 2022 23:07):    Moderate polymorphonuclear leukocytes per low power field    No Squamous epithelial cells per low power field    Few Yeast like cells per oil power field  Final Report (12 Dec 2022 17:35):    Normal Respiratory Christy present CARDIOLOGY CONSULT PROGRESS NOTE  EDUARDO REAL  MRN-[id number]    INTERVAL EVENTS:  - tele:   -     ROS:  Negative, except as above.    MEDICATIONS  (STANDING):  aMIOdarone    Tablet   Oral   aMIOdarone    Tablet 400 milliGRAM(s) Oral every 8 hours  atorvastatin 80 milliGRAM(s) Oral at bedtime  chlorhexidine 0.12% Liquid 15 milliLiter(s) Oral Mucosa every 12 hours  chlorhexidine 2% Cloths 1 Application(s) Topical <User Schedule>  CRRT Treatment    <Continuous>  dexMEDEtomidine Infusion 0.3 MICROgram(s)/kG/Hr (6.97 mL/Hr) IV Continuous <Continuous>  fluconAZOLE IVPB      fluconAZOLE IVPB 200 milliGRAM(s) IV Intermittent every 24 hours  heparin  Infusion 800 Unit(s)/Hr (16.5 mL/Hr) IV Continuous <Continuous>  insulin lispro (ADMELOG) corrective regimen sliding scale   SubCutaneous every 4 hours  meropenem  IVPB 1000 milliGRAM(s) IV Intermittent every 12 hours  milrinone Infusion 0.1 MICROgram(s)/kG/Min (2.79 mL/Hr) IV Continuous <Continuous>  multivitamin/minerals/iron Oral Solution (CENTRUM) 15 milliLiter(s) Oral daily  pantoprazole  Injectable 40 milliGRAM(s) IV Push daily  Phoxillum Filtration BK 4 / 2.5 5000 milliLiter(s) (1000 mL/Hr) CRRT <Continuous>  polyethylene glycol 3350 17 Gram(s) Oral two times a day  potassium chloride  10 mEq/50 mL IVPB 10 milliEquivalent(s) IV Intermittent every 1 hour  PrismaSATE Dialysate BK 0 / 3.5 5000 milliLiter(s) (2000 mL/Hr) CRRT <Continuous>  PrismaSOL Filtration BGK 4 / 2.5 5000 milliLiter(s) (200 mL/Hr) CRRT <Continuous>  senna 2 Tablet(s) Oral at bedtime  sodium chloride 0.9%. 1000 milliLiter(s) (5 mL/Hr) IV Continuous <Continuous>  thiamine 100 milliGRAM(s) Enteral Tube daily  vancomycin  IVPB 1000 milliGRAM(s) IV Intermittent every 24 hours    MEDICATIONS  (PRN):  acetaminophen    Suspension .. 650 milliGRAM(s) Enteral Tube every 6 hours PRN Temp greater or equal to 38C (100.4F), Mild Pain (1 - 3)    Allergies  No Known Allergies    P/E:  Adult Advanced Hemodynamics Last 24 Hrs  CVP(mm Hg): 9 (13 Dec 2022 07:00) (5 - 26)    Vital Signs Last 24 Hrs  T(C): 37.1 (13 Dec 2022 04:00), Max: 37.1 (13 Dec 2022 04:00)  T(F): 98.8 (13 Dec 2022 04:00), Max: 98.8 (13 Dec 2022 04:00)  HR: 104 (13 Dec 2022 07:00) (100 - 123)  RR: 18 (13 Dec 2022 07:00) (10 - 34)  SpO2: 100% (13 Dec 2022 07:00) (98% - 100%)    Patient On (Oxygen Delivery Method): ventilator  O2 Concentration (%): 40    Daily Weight in k.7 (13 Dec 2022 00:00)    I&O's Detail  12 Dec 2022 07:01  -  13 Dec 2022 07:00  IN:    Amiodarone: 100.2 mL    Dexmedetomidine: 133 mL    Enteral Tube Flush: 60 mL    Glucerna: 795 mL    Heparin: 331.5 mL    IV PiggyBack: 600 mL    Milrinone: 67.2 mL    sodium chloride 0.9%: 120 mL  Total IN: 2206.9 mL  OUT:    Indwelling Catheter - Urethral (mL): 235 mL    Other (mL): 2664 mL    Vasopressin: 0 mL  Total OUT: 2899 mL  Total NET: -692.1 mL    I&O's Summary  12 Dec 2022 07:01  -  13 Dec 2022 07:00  --------------------------------------------------------  IN: 2206.9 mL / OUT: 2899 mL / NET: -692.1 mL    Gen: intubated  laying in hospital bed, rousable and follows commands  HEENT: NCAT, ETT in place, MMM  CV: tachycardic; systolic murmur, S1/S2  Resp: mechanical BS  Abd: soft, NT, hypoactive BS  Ext:  WWP, PPP, IABP L groin, wound vac R groin    RELEVANT RECENT LABS/IMAGING/STUDIES:                        9.0    17.35 )-----------( 128      ( 13 Dec 2022 01:08 )             28.7     -    135  |  98  |  22  ----------------------------<  124<H>  3.3<L>   |  21<L>  |  1.38<H>    Ca    8.5      13 Dec 2022 01:08  Phos  3.5       Mg     2.1         TPro  6.1  /  Alb  2.6<L>  /  TBili  1.2  /  DBili  x   /  AST  39  /  ALT  15  /  AlkPhos  125<H>      LIVER FUNCTIONS - ( 13 Dec 2022 01:08 )  Alb: 2.6 g/dL / Pro: 6.1 g/dL / ALK PHOS: 125 U/L / ALT: 15 U/L / AST: 39 U/L / GGT: x           PT/INR - ( 13 Dec 2022 01:08 )   PT: 12.9 sec;   INR: 1.12 ratio       PTT - ( 13 Dec 2022 05:37 )  PTT:53.3 sec    ABG - ( 13 Dec 2022 00:00 )  pH, Arterial: 7.41  pH, Blood: x     /  pCO2: 35    /  pO2: 124   / HCO3: 22    / Base Excess: -2.1  /  SaO2: 98.3      Culture - Urine (collected 11 Dec 2022 00:26)  Source: Catheterized Catheterized  Final Report (12 Dec 2022 09:21):    No growth    Culture - Blood (collected 10 Dec 2022 15:45)  Source: .Blood Blood-Peripheral  Preliminary Report (11 Dec 2022 22:02):    No growth to date.    Culture - Blood (collected 10 Dec 2022 15:30)  Source: .Blood Blood-Peripheral  Preliminary Report (11 Dec 2022 22:02):    No growth to date.    Culture - Sputum (collected 10 Dec 2022 10:49)  Source: ET Tube ET Tube  Gram Stain (10 Dec 2022 23:07):    Moderate polymorphonuclear leukocytes per low power field    No Squamous epithelial cells per low power field    Few Yeast like cells per oil power field  Final Report (12 Dec 2022 17:35):    Normal Respiratory Christy present CARDIOLOGY CONSULT PROGRESS NOTE  EDUARDO REAL  MRN-[id number]    INTERVAL EVENTS:  - tele:   -     ROS:  Negative, except as above.    MEDICATIONS  (STANDING):  aMIOdarone    Tablet   Oral   aMIOdarone    Tablet 400 milliGRAM(s) Oral every 8 hours  atorvastatin 80 milliGRAM(s) Oral at bedtime  chlorhexidine 0.12% Liquid 15 milliLiter(s) Oral Mucosa every 12 hours  chlorhexidine 2% Cloths 1 Application(s) Topical <User Schedule>  CRRT Treatment    <Continuous>  dexMEDEtomidine Infusion 0.3 MICROgram(s)/kG/Hr (6.97 mL/Hr) IV Continuous <Continuous>  fluconAZOLE IVPB      fluconAZOLE IVPB 200 milliGRAM(s) IV Intermittent every 24 hours  heparin  Infusion 800 Unit(s)/Hr (16.5 mL/Hr) IV Continuous <Continuous>  insulin lispro (ADMELOG) corrective regimen sliding scale   SubCutaneous every 4 hours  meropenem  IVPB 1000 milliGRAM(s) IV Intermittent every 12 hours  milrinone Infusion 0.1 MICROgram(s)/kG/Min (2.79 mL/Hr) IV Continuous <Continuous>  multivitamin/minerals/iron Oral Solution (CENTRUM) 15 milliLiter(s) Oral daily  pantoprazole  Injectable 40 milliGRAM(s) IV Push daily  Phoxillum Filtration BK 4 / 2.5 5000 milliLiter(s) (1000 mL/Hr) CRRT <Continuous>  polyethylene glycol 3350 17 Gram(s) Oral two times a day  potassium chloride  10 mEq/50 mL IVPB 10 milliEquivalent(s) IV Intermittent every 1 hour  PrismaSATE Dialysate BK 0 / 3.5 5000 milliLiter(s) (2000 mL/Hr) CRRT <Continuous>  PrismaSOL Filtration BGK 4 / 2.5 5000 milliLiter(s) (200 mL/Hr) CRRT <Continuous>  senna 2 Tablet(s) Oral at bedtime  sodium chloride 0.9%. 1000 milliLiter(s) (5 mL/Hr) IV Continuous <Continuous>  thiamine 100 milliGRAM(s) Enteral Tube daily  vancomycin  IVPB 1000 milliGRAM(s) IV Intermittent every 24 hours    MEDICATIONS  (PRN):  acetaminophen    Suspension .. 650 milliGRAM(s) Enteral Tube every 6 hours PRN Temp greater or equal to 38C (100.4F), Mild Pain (1 - 3)    Allergies  No Known Allergies    P/E:  Adult Advanced Hemodynamics Last 24 Hrs  CVP(mm Hg): 9 (13 Dec 2022 07:00) (5 - 26)    Vital Signs Last 24 Hrs  T(C): 37.1 (13 Dec 2022 04:00), Max: 37.1 (13 Dec 2022 04:00)  T(F): 98.8 (13 Dec 2022 04:00), Max: 98.8 (13 Dec 2022 04:00)  HR: 104 (13 Dec 2022 07:00) (100 - 123)  RR: 18 (13 Dec 2022 07:00) (10 - 34)  SpO2: 100% (13 Dec 2022 07:00) (98% - 100%)    Patient On (Oxygen Delivery Method): ventilator  O2 Concentration (%): 40    Daily Weight in k.7 (13 Dec 2022 00:00)    I&O's Detail  12 Dec 2022 07:01  -  13 Dec 2022 07:00  IN:    Amiodarone: 100.2 mL    Dexmedetomidine: 133 mL    Enteral Tube Flush: 60 mL    Glucerna: 795 mL    Heparin: 331.5 mL    IV PiggyBack: 600 mL    Milrinone: 67.2 mL    sodium chloride 0.9%: 120 mL  Total IN: 2206.9 mL  OUT:    Indwelling Catheter - Urethral (mL): 235 mL    Other (mL): 2664 mL    Vasopressin: 0 mL  Total OUT: 2899 mL  Total NET: -692.1 mL    I&O's Summary  12 Dec 2022 07:01  -  13 Dec 2022 07:00  --------------------------------------------------------  IN: 2206.9 mL / OUT: 2899 mL / NET: -692.1 mL    Gen: intubated  laying in hospital bed, rousable and follows commands  HEENT: NCAT, ETT in place, MMM  CV: tachycardic; systolic murmur, S1/S2  Resp: mechanical BS  Abd: soft, NT, hypoactive BS  Ext:  WWP, PPP, IABP L groin, wound vac R groin    RELEVANT RECENT LABS/IMAGING/STUDIES:                        9.0    17.35 )-----------( 128      ( 13 Dec 2022 01:08 )             28.7     -    135  |  98  |  22  ----------------------------<  124<H>  3.3<L>   |  21<L>  |  1.38<H>    Ca    8.5      13 Dec 2022 01:08  Phos  3.5       Mg     2.1         TPro  6.1  /  Alb  2.6<L>  /  TBili  1.2  /  DBili  x   /  AST  39  /  ALT  15  /  AlkPhos  125<H>      LIVER FUNCTIONS - ( 13 Dec 2022 01:08 )  Alb: 2.6 g/dL / Pro: 6.1 g/dL / ALK PHOS: 125 U/L / ALT: 15 U/L / AST: 39 U/L / GGT: x           PT/INR - ( 13 Dec 2022 01:08 )   PT: 12.9 sec;   INR: 1.12 ratio       PTT - ( 13 Dec 2022 05:37 )  PTT:53.3 sec    ABG - ( 13 Dec 2022 00:00 )  pH, Arterial: 7.41  pH, Blood: x     /  pCO2: 35    /  pO2: 124   / HCO3: 22    / Base Excess: -2.1  /  SaO2: 98.3      Culture - Urine (collected 11 Dec 2022 00:26)  Source: Catheterized Catheterized  Final Report (12 Dec 2022 09:21):    No growth    Culture - Blood (collected 10 Dec 2022 15:45)  Source: .Blood Blood-Peripheral  Preliminary Report (11 Dec 2022 22:02):    No growth to date.    Culture - Blood (collected 10 Dec 2022 15:30)  Source: .Blood Blood-Peripheral  Preliminary Report (11 Dec 2022 22:02):    No growth to date.    Culture - Sputum (collected 10 Dec 2022 10:49)  Source: ET Tube ET Tube  Gram Stain (10 Dec 2022 23:07):    Moderate polymorphonuclear leukocytes per low power field    No Squamous epithelial cells per low power field    Few Yeast like cells per oil power field  Final Report (12 Dec 2022 17:35):    Normal Respiratory Christy present CARDIOLOGY CONSULT PROGRESS NOTE  EDUARDO REAL  MRN-[id number]    INTERVAL EVENTS:  - tele: NSR 90-120s  - intubated/sedated, follows commands    ROS:  Negative, except as above.    MEDICATIONS  (STANDING):  aMIOdarone    Tablet   Oral   aMIOdarone    Tablet 400 milliGRAM(s) Oral every 8 hours  atorvastatin 80 milliGRAM(s) Oral at bedtime  chlorhexidine 0.12% Liquid 15 milliLiter(s) Oral Mucosa every 12 hours  chlorhexidine 2% Cloths 1 Application(s) Topical <User Schedule>  CRRT Treatment    <Continuous>  dexMEDEtomidine Infusion 0.3 MICROgram(s)/kG/Hr (6.97 mL/Hr) IV Continuous <Continuous>  fluconAZOLE IVPB      fluconAZOLE IVPB 200 milliGRAM(s) IV Intermittent every 24 hours  heparin  Infusion 800 Unit(s)/Hr (16.5 mL/Hr) IV Continuous <Continuous>  insulin lispro (ADMELOG) corrective regimen sliding scale   SubCutaneous every 4 hours  meropenem  IVPB 1000 milliGRAM(s) IV Intermittent every 12 hours  milrinone Infusion 0.1 MICROgram(s)/kG/Min (2.79 mL/Hr) IV Continuous <Continuous>  multivitamin/minerals/iron Oral Solution (CENTRUM) 15 milliLiter(s) Oral daily  pantoprazole  Injectable 40 milliGRAM(s) IV Push daily  Phoxillum Filtration BK 4 / 2.5 5000 milliLiter(s) (1000 mL/Hr) CRRT <Continuous>  polyethylene glycol 3350 17 Gram(s) Oral two times a day  potassium chloride  10 mEq/50 mL IVPB 10 milliEquivalent(s) IV Intermittent every 1 hour  PrismaSATE Dialysate BK 0 / 3.5 5000 milliLiter(s) (2000 mL/Hr) CRRT <Continuous>  PrismaSOL Filtration BGK 4 / 2.5 5000 milliLiter(s) (200 mL/Hr) CRRT <Continuous>  senna 2 Tablet(s) Oral at bedtime  sodium chloride 0.9%. 1000 milliLiter(s) (5 mL/Hr) IV Continuous <Continuous>  thiamine 100 milliGRAM(s) Enteral Tube daily  vancomycin  IVPB 1000 milliGRAM(s) IV Intermittent every 24 hours    MEDICATIONS  (PRN):  acetaminophen    Suspension .. 650 milliGRAM(s) Enteral Tube every 6 hours PRN Temp greater or equal to 38C (100.4F), Mild Pain (1 - 3)    Allergies  No Known Allergies    P/E:  Adult Advanced Hemodynamics Last 24 Hrs  CVP(mm Hg): 9 (13 Dec 2022 07:00) (5 - 26)    Vital Signs Last 24 Hrs  T(C): 37.1 (13 Dec 2022 04:00), Max: 37.1 (13 Dec 2022 04:00)  T(F): 98.8 (13 Dec 2022 04:00), Max: 98.8 (13 Dec 2022 04:00)  HR: 104 (13 Dec 2022 07:00) (100 - 123)  RR: 18 (13 Dec 2022 07:00) (10 - 34)  SpO2: 100% (13 Dec 2022 07:00) (98% - 100%)    Patient On (Oxygen Delivery Method): ventilator  O2 Concentration (%): 40    Daily Weight in k.7 (13 Dec 2022 00:00)    I&O's Detail  12 Dec 2022 07:01  -  13 Dec 2022 07:00  IN:    Amiodarone: 100.2 mL    Dexmedetomidine: 133 mL    Enteral Tube Flush: 60 mL    Glucerna: 795 mL    Heparin: 331.5 mL    IV PiggyBack: 600 mL    Milrinone: 67.2 mL    sodium chloride 0.9%: 120 mL  Total IN: 2206.9 mL  OUT:    Indwelling Catheter - Urethral (mL): 235 mL    Other (mL): 2664 mL    Vasopressin: 0 mL  Total OUT: 2899 mL  Total NET: -692.1 mL    I&O's Summary  12 Dec 2022 07:01  -  13 Dec 2022 07:00  --------------------------------------------------------  IN: 2206.9 mL / OUT: 2899 mL / NET: -692.1 mL    Gen: intubated, laying in hospital bed, rousable and follows commands  HEENT: NCAT, ETT in place, MMM  CV: tachycardic; systolic murmur, S1/S2  Resp: mechanical BS  Abd: soft, NT, hypoactive BS  Ext:  WWP, PPP, IABP L groin,+MESERTE    RELEVANT RECENT LABS/IMAGING/STUDIES:                        9.0    17.35 )-----------( 128      ( 13 Dec 2022 01:08 )             28.7         135  |  98  |  22  ----------------------------<  124<H>  3.3<L>   |  21<L>  |  1.38<H>    Ca    8.5      13 Dec 2022 01:08  Phos  3.5       Mg     2.1         TPro  6.1  /  Alb  2.6<L>  /  TBili  1.2  /  DBili  x   /  AST  39  /  ALT  15  /  AlkPhos  125<H>      LIVER FUNCTIONS - ( 13 Dec 2022 01:08 )  Alb: 2.6 g/dL / Pro: 6.1 g/dL / ALK PHOS: 125 U/L / ALT: 15 U/L / AST: 39 U/L / GGT: x           PT/INR - ( 13 Dec 2022 01:08 )   PT: 12.9 sec;   INR: 1.12 ratio       PTT - ( 13 Dec 2022 05:37 )  PTT:53.3 sec    ABG - ( 13 Dec 2022 00:00 )  pH, Arterial: 7.41  pH, Blood: x     /  pCO2: 35    /  pO2: 124   / HCO3: 22    / Base Excess: -2.1  /  SaO2: 98.3      Culture - Urine (collected 11 Dec 2022 00:26)  Source: Catheterized Catheterized  Final Report (12 Dec 2022 09:21):    No growth    Culture - Blood (collected 10 Dec 2022 15:45)  Source: .Blood Blood-Peripheral  Preliminary Report (11 Dec 2022 22:02):    No growth to date.    Culture - Blood (collected 10 Dec 2022 15:30)  Source: .Blood Blood-Peripheral  Preliminary Report (11 Dec 2022 22:02):    No growth to date.    Culture - Sputum (collected 10 Dec 2022 10:49)  Source: ET Tube ET Tube  Gram Stain (10 Dec 2022 23:07):    Moderate polymorphonuclear leukocytes per low power field    No Squamous epithelial cells per low power field    Few Yeast like cells per oil power field  Final Report (12 Dec 2022 17:35):    Normal Respiratory Christy present CARDIOLOGY CONSULT PROGRESS NOTE  EDUARDO REAL  [id number]    INTERVAL EVENTS:  - tele: NSR 90-120s  - intubated/sedated, follows commands    ROS:  Negative, except as above.    MEDICATIONS  (STANDING):  aMIOdarone    Tablet   Oral   aMIOdarone    Tablet 400 milliGRAM(s) Oral every 8 hours  atorvastatin 80 milliGRAM(s) Oral at bedtime  chlorhexidine 0.12% Liquid 15 milliLiter(s) Oral Mucosa every 12 hours  chlorhexidine 2% Cloths 1 Application(s) Topical <User Schedule>  CRRT Treatment    <Continuous>  dexMEDEtomidine Infusion 0.3 MICROgram(s)/kG/Hr (6.97 mL/Hr) IV Continuous <Continuous>  fluconAZOLE IVPB      fluconAZOLE IVPB 200 milliGRAM(s) IV Intermittent every 24 hours  heparin  Infusion 800 Unit(s)/Hr (16.5 mL/Hr) IV Continuous <Continuous>  insulin lispro (ADMELOG) corrective regimen sliding scale   SubCutaneous every 4 hours  meropenem  IVPB 1000 milliGRAM(s) IV Intermittent every 12 hours  milrinone Infusion 0.1 MICROgram(s)/kG/Min (2.79 mL/Hr) IV Continuous <Continuous>  multivitamin/minerals/iron Oral Solution (CENTRUM) 15 milliLiter(s) Oral daily  pantoprazole  Injectable 40 milliGRAM(s) IV Push daily  Phoxillum Filtration BK 4 / 2.5 5000 milliLiter(s) (1000 mL/Hr) CRRT <Continuous>  polyethylene glycol 3350 17 Gram(s) Oral two times a day  potassium chloride  10 mEq/50 mL IVPB 10 milliEquivalent(s) IV Intermittent every 1 hour  PrismaSATE Dialysate BK 0 / 3.5 5000 milliLiter(s) (2000 mL/Hr) CRRT <Continuous>  PrismaSOL Filtration BGK 4 / 2.5 5000 milliLiter(s) (200 mL/Hr) CRRT <Continuous>  senna 2 Tablet(s) Oral at bedtime  sodium chloride 0.9%. 1000 milliLiter(s) (5 mL/Hr) IV Continuous <Continuous>  thiamine 100 milliGRAM(s) Enteral Tube daily  vancomycin  IVPB 1000 milliGRAM(s) IV Intermittent every 24 hours    MEDICATIONS  (PRN):  acetaminophen    Suspension .. 650 milliGRAM(s) Enteral Tube every 6 hours PRN Temp greater or equal to 38C (100.4F), Mild Pain (1 - 3)    Allergies  No Known Allergies    P/E:  Adult Advanced Hemodynamics Last 24 Hrs  CVP(mm Hg): 9 (13 Dec 2022 07:00) (5 - 26)    Vital Signs Last 24 Hrs  T(C): 37.1 (13 Dec 2022 04:00), Max: 37.1 (13 Dec 2022 04:00)  T(F): 98.8 (13 Dec 2022 04:00), Max: 98.8 (13 Dec 2022 04:00)  HR: 104 (13 Dec 2022 07:00) (100 - 123)  RR: 18 (13 Dec 2022 07:00) (10 - 34)  SpO2: 100% (13 Dec 2022 07:00) (98% - 100%)    Patient On (Oxygen Delivery Method): ventilator  O2 Concentration (%): 40    Daily Weight in k.7 (13 Dec 2022 00:00)    I&O's Detail  12 Dec 2022 07:01  -  13 Dec 2022 07:00  IN:    Amiodarone: 100.2 mL    Dexmedetomidine: 133 mL    Enteral Tube Flush: 60 mL    Glucerna: 795 mL    Heparin: 331.5 mL    IV PiggyBack: 600 mL    Milrinone: 67.2 mL    sodium chloride 0.9%: 120 mL  Total IN: 2206.9 mL  OUT:    Indwelling Catheter - Urethral (mL): 235 mL    Other (mL): 2664 mL    Vasopressin: 0 mL  Total OUT: 2899 mL  Total NET: -692.1 mL    I&O's Summary  12 Dec 2022 07:01  -  13 Dec 2022 07:00  --------------------------------------------------------  IN: 2206.9 mL / OUT: 2899 mL / NET: -692.1 mL    Gen: intubated, laying in hospital bed, rousable and follows commands  HEENT: NCAT, ETT in place, MMM  CV: tachycardic; systolic murmur, S1/S2  Resp: mechanical BS  Abd: soft, NT, hypoactive BS  Ext:  WWP, PPP, IABP L groin,+MESERET    RELEVANT RECENT LABS/IMAGING/STUDIES:                        9.0    17.35 )-----------( 128      ( 13 Dec 2022 01:08 )             28.7         135  |  98  |  22  ----------------------------<  124<H>  3.3<L>   |  21<L>  |  1.38<H>    Ca    8.5      13 Dec 2022 01:08  Phos  3.5       Mg     2.1         TPro  6.1  /  Alb  2.6<L>  /  TBili  1.2  /  DBili  x   /  AST  39  /  ALT  15  /  AlkPhos  125<H>      LIVER FUNCTIONS - ( 13 Dec 2022 01:08 )  Alb: 2.6 g/dL / Pro: 6.1 g/dL / ALK PHOS: 125 U/L / ALT: 15 U/L / AST: 39 U/L / GGT: x           PT/INR - ( 13 Dec 2022 01:08 )   PT: 12.9 sec;   INR: 1.12 ratio       PTT - ( 13 Dec 2022 05:37 )  PTT:53.3 sec    ABG - ( 13 Dec 2022 00:00 )  pH, Arterial: 7.41  pH, Blood: x     /  pCO2: 35    /  pO2: 124   / HCO3: 22    / Base Excess: -2.1  /  SaO2: 98.3      Culture - Urine (collected 11 Dec 2022 00:26)  Source: Catheterized Catheterized  Final Report (12 Dec 2022 09:21):    No growth    Culture - Blood (collected 10 Dec 2022 15:45)  Source: .Blood Blood-Peripheral  Preliminary Report (11 Dec 2022 22:02):    No growth to date.    Culture - Blood (collected 10 Dec 2022 15:30)  Source: .Blood Blood-Peripheral  Preliminary Report (11 Dec 2022 22:02):    No growth to date.    Culture - Sputum (collected 10 Dec 2022 10:49)  Source: ET Tube ET Tube  Gram Stain (10 Dec 2022 23:07):    Moderate polymorphonuclear leukocytes per low power field    No Squamous epithelial cells per low power field    Few Yeast like cells per oil power field  Final Report (12 Dec 2022 17:35):    Normal Respiratory Christy present CARDIOLOGY CONSULT PROGRESS NOTE  EDUARDO REAL  [id number]    INTERVAL EVENTS:  - tele: NSR 90-120s  - intubated/sedated, follows commands    ROS:  Negative, except as above.    MEDICATIONS  (STANDING):  aMIOdarone    Tablet   Oral   aMIOdarone    Tablet 400 milliGRAM(s) Oral every 8 hours  atorvastatin 80 milliGRAM(s) Oral at bedtime  chlorhexidine 0.12% Liquid 15 milliLiter(s) Oral Mucosa every 12 hours  chlorhexidine 2% Cloths 1 Application(s) Topical <User Schedule>  CRRT Treatment    <Continuous>  dexMEDEtomidine Infusion 0.3 MICROgram(s)/kG/Hr (6.97 mL/Hr) IV Continuous <Continuous>  fluconAZOLE IVPB      fluconAZOLE IVPB 200 milliGRAM(s) IV Intermittent every 24 hours  heparin  Infusion 800 Unit(s)/Hr (16.5 mL/Hr) IV Continuous <Continuous>  insulin lispro (ADMELOG) corrective regimen sliding scale   SubCutaneous every 4 hours  meropenem  IVPB 1000 milliGRAM(s) IV Intermittent every 12 hours  milrinone Infusion 0.1 MICROgram(s)/kG/Min (2.79 mL/Hr) IV Continuous <Continuous>  multivitamin/minerals/iron Oral Solution (CENTRUM) 15 milliLiter(s) Oral daily  pantoprazole  Injectable 40 milliGRAM(s) IV Push daily  Phoxillum Filtration BK 4 / 2.5 5000 milliLiter(s) (1000 mL/Hr) CRRT <Continuous>  polyethylene glycol 3350 17 Gram(s) Oral two times a day  potassium chloride  10 mEq/50 mL IVPB 10 milliEquivalent(s) IV Intermittent every 1 hour  PrismaSATE Dialysate BK 0 / 3.5 5000 milliLiter(s) (2000 mL/Hr) CRRT <Continuous>  PrismaSOL Filtration BGK 4 / 2.5 5000 milliLiter(s) (200 mL/Hr) CRRT <Continuous>  senna 2 Tablet(s) Oral at bedtime  sodium chloride 0.9%. 1000 milliLiter(s) (5 mL/Hr) IV Continuous <Continuous>  thiamine 100 milliGRAM(s) Enteral Tube daily  vancomycin  IVPB 1000 milliGRAM(s) IV Intermittent every 24 hours    MEDICATIONS  (PRN):  acetaminophen    Suspension .. 650 milliGRAM(s) Enteral Tube every 6 hours PRN Temp greater or equal to 38C (100.4F), Mild Pain (1 - 3)    Allergies  No Known Allergies    P/E:  Adult Advanced Hemodynamics Last 24 Hrs  CVP(mm Hg): 9 (13 Dec 2022 07:00) (5 - 26)    Vital Signs Last 24 Hrs  T(C): 37.1 (13 Dec 2022 04:00), Max: 37.1 (13 Dec 2022 04:00)  T(F): 98.8 (13 Dec 2022 04:00), Max: 98.8 (13 Dec 2022 04:00)  HR: 104 (13 Dec 2022 07:00) (100 - 123)  RR: 18 (13 Dec 2022 07:00) (10 - 34)  SpO2: 100% (13 Dec 2022 07:00) (98% - 100%)    Patient On (Oxygen Delivery Method): ventilator  O2 Concentration (%): 40    Daily Weight in k.7 (13 Dec 2022 00:00)    I&O's Detail  12 Dec 2022 07:01  -  13 Dec 2022 07:00  IN:    Amiodarone: 100.2 mL    Dexmedetomidine: 133 mL    Enteral Tube Flush: 60 mL    Glucerna: 795 mL    Heparin: 331.5 mL    IV PiggyBack: 600 mL    Milrinone: 67.2 mL    sodium chloride 0.9%: 120 mL  Total IN: 2206.9 mL  OUT:    Indwelling Catheter - Urethral (mL): 235 mL    Other (mL): 2664 mL    Vasopressin: 0 mL  Total OUT: 2899 mL  Total NET: -692.1 mL    I&O's Summary  12 Dec 2022 07:01  -  13 Dec 2022 07:00  --------------------------------------------------------  IN: 2206.9 mL / OUT: 2899 mL / NET: -692.1 mL    Gen: intubated, laying in hospital bed, rousable and follows commands  HEENT: NCAT, ETT in place, MMM  CV: tachycardic; systolic murmur, S1/S2  Resp: mechanical BS  Abd: soft, NT, hypoactive BS  Ext:  WWP, PPP, IABP L groin,+MESERET    RELEVANT RECENT LABS/IMAGING/STUDIES:                        9.0    17.35 )-----------( 128      ( 13 Dec 2022 01:08 )             28.7         135  |  98  |  22  ----------------------------<  124<H>  3.3<L>   |  21<L>  |  1.38<H>    Ca    8.5      13 Dec 2022 01:08  Phos  3.5       Mg     2.1         TPro  6.1  /  Alb  2.6<L>  /  TBili  1.2  /  DBili  x   /  AST  39  /  ALT  15  /  AlkPhos  125<H>      LIVER FUNCTIONS - ( 13 Dec 2022 01:08 )  Alb: 2.6 g/dL / Pro: 6.1 g/dL / ALK PHOS: 125 U/L / ALT: 15 U/L / AST: 39 U/L / GGT: x           PT/INR - ( 13 Dec 2022 01:08 )   PT: 12.9 sec;   INR: 1.12 ratio       PTT - ( 13 Dec 2022 05:37 )  PTT:53.3 sec    ABG - ( 13 Dec 2022 00:00 )  pH, Arterial: 7.41  pH, Blood: x     /  pCO2: 35    /  pO2: 124   / HCO3: 22    / Base Excess: -2.1  /  SaO2: 98.3      Culture - Urine (collected 11 Dec 2022 00:26)  Source: Catheterized Catheterized  Final Report (12 Dec 2022 09:21):    No growth    Culture - Blood (collected 10 Dec 2022 15:45)  Source: .Blood Blood-Peripheral  Preliminary Report (11 Dec 2022 22:02):    No growth to date.    Culture - Blood (collected 10 Dec 2022 15:30)  Source: .Blood Blood-Peripheral  Preliminary Report (11 Dec 2022 22:02):    No growth to date.    Culture - Sputum (collected 10 Dec 2022 10:49)  Source: ET Tube ET Tube  Gram Stain (10 Dec 2022 23:07):    Moderate polymorphonuclear leukocytes per low power field    No Squamous epithelial cells per low power field    Few Yeast like cells per oil power field  Final Report (12 Dec 2022 17:35):    Normal Respiratory Christy present

## 2022-12-13 NOTE — PROGRESS NOTE ADULT - PROBLEM SELECTOR PLAN 4
- ECMO decannulated 12/08 after successful wean  - IABP in place, milrinone 0.1, keep 1:1, target augmented MAP 70s though will defer afterload agent titration while there is c/f developing sepsis  - cont hep gtt for AC while on IABP

## 2022-12-14 DIAGNOSIS — J96.90 RESPIRATORY FAILURE, UNSPECIFIED, UNSPECIFIED WHETHER WITH HYPOXIA OR HYPERCAPNIA: ICD-10-CM

## 2022-12-14 LAB
ALBUMIN SERPL ELPH-MCNC: 2.9 G/DL — LOW (ref 3.3–5)
ALP SERPL-CCNC: 118 U/L — SIGNIFICANT CHANGE UP (ref 40–120)
ALT FLD-CCNC: 14 U/L — SIGNIFICANT CHANGE UP (ref 10–45)
AMYLASE P1 CFR SERPL: 289 U/L — HIGH (ref 25–125)
ANION GAP SERPL CALC-SCNC: 11 MMOL/L — SIGNIFICANT CHANGE UP (ref 5–17)
APTT BLD: 60.8 SEC — HIGH (ref 27.5–35.5)
APTT BLD: 61 SEC — HIGH (ref 27.5–35.5)
AST SERPL-CCNC: 33 U/L — SIGNIFICANT CHANGE UP (ref 10–40)
BASE EXCESS BLDV CALC-SCNC: -1.5 MMOL/L — SIGNIFICANT CHANGE UP (ref -2–3)
BASE EXCESS BLDV CALC-SCNC: -2.8 MMOL/L — LOW (ref -2–3)
BILIRUB SERPL-MCNC: 1 MG/DL — SIGNIFICANT CHANGE UP (ref 0.2–1.2)
BUN SERPL-MCNC: 22 MG/DL — SIGNIFICANT CHANGE UP (ref 7–23)
CA-I SERPL-SCNC: 1.12 MMOL/L — LOW (ref 1.15–1.33)
CALCIUM SERPL-MCNC: 8 MG/DL — LOW (ref 8.4–10.5)
CHLORIDE BLDV-SCNC: 104 MMOL/L — SIGNIFICANT CHANGE UP (ref 96–108)
CHLORIDE BLDV-SCNC: 106 MMOL/L — SIGNIFICANT CHANGE UP (ref 96–108)
CHLORIDE SERPL-SCNC: 102 MMOL/L — SIGNIFICANT CHANGE UP (ref 96–108)
CO2 BLDV-SCNC: 23 MMOL/L — SIGNIFICANT CHANGE UP (ref 22–26)
CO2 BLDV-SCNC: 25 MMOL/L — SIGNIFICANT CHANGE UP (ref 22–26)
CO2 SERPL-SCNC: 22 MMOL/L — SIGNIFICANT CHANGE UP (ref 22–31)
CREAT SERPL-MCNC: 1.43 MG/DL — HIGH (ref 0.5–1.3)
CULTURE RESULTS: SIGNIFICANT CHANGE UP
EGFR: 55 ML/MIN/1.73M2 — LOW
FIBRINOGEN PPP-MCNC: 383 MG/DL — SIGNIFICANT CHANGE UP (ref 200–445)
GAS PNL BLDA: SIGNIFICANT CHANGE UP
GAS PNL BLDV: 133 MMOL/L — LOW (ref 136–145)
GAS PNL BLDV: 134 MMOL/L — LOW (ref 136–145)
GAS PNL BLDV: SIGNIFICANT CHANGE UP
GLUCOSE BLDC GLUCOMTR-MCNC: 137 MG/DL — HIGH (ref 70–99)
GLUCOSE BLDC GLUCOMTR-MCNC: 143 MG/DL — HIGH (ref 70–99)
GLUCOSE BLDC GLUCOMTR-MCNC: 145 MG/DL — HIGH (ref 70–99)
GLUCOSE BLDC GLUCOMTR-MCNC: 155 MG/DL — HIGH (ref 70–99)
GLUCOSE BLDC GLUCOMTR-MCNC: 158 MG/DL — HIGH (ref 70–99)
GLUCOSE BLDC GLUCOMTR-MCNC: 197 MG/DL — HIGH (ref 70–99)
GLUCOSE BLDV-MCNC: 132 MG/DL — HIGH (ref 70–99)
GLUCOSE BLDV-MCNC: 154 MG/DL — HIGH (ref 70–99)
GLUCOSE SERPL-MCNC: 157 MG/DL — HIGH (ref 70–99)
HAPTOGLOB SERPL-MCNC: <20 MG/DL — LOW (ref 34–200)
HCO3 BLDV-SCNC: 22 MMOL/L — SIGNIFICANT CHANGE UP (ref 22–29)
HCO3 BLDV-SCNC: 24 MMOL/L — SIGNIFICANT CHANGE UP (ref 22–29)
HCT VFR BLD CALC: 29.3 % — LOW (ref 39–50)
HCT VFR BLDA CALC: 28 % — LOW (ref 39–51)
HGB BLD CALC-MCNC: 9.2 G/DL — LOW (ref 12.6–17.4)
HGB BLD-MCNC: 9.2 G/DL — LOW (ref 13–17)
HOROWITZ INDEX BLDV+IHG-RTO: 40 — SIGNIFICANT CHANGE UP
INR BLD: 1.08 RATIO — SIGNIFICANT CHANGE UP (ref 0.88–1.16)
LACTATE BLDV-MCNC: 0.8 MMOL/L — SIGNIFICANT CHANGE UP (ref 0.5–2)
LACTATE BLDV-MCNC: 1 MMOL/L — SIGNIFICANT CHANGE UP (ref 0.5–2)
LDH SERPL L TO P-CCNC: 34 U/L — LOW (ref 50–242)
LIDOCAIN IGE QN: 894 U/L — HIGH (ref 7–60)
MAGNESIUM SERPL-MCNC: 2.4 MG/DL — SIGNIFICANT CHANGE UP (ref 1.6–2.6)
MCHC RBC-ENTMCNC: 29.2 PG — SIGNIFICANT CHANGE UP (ref 27–34)
MCHC RBC-ENTMCNC: 31.4 GM/DL — LOW (ref 32–36)
MCV RBC AUTO: 93 FL — SIGNIFICANT CHANGE UP (ref 80–100)
NRBC # BLD: 0 /100 WBCS — SIGNIFICANT CHANGE UP (ref 0–0)
PCO2 BLDV: 37 MMHG — LOW (ref 42–55)
PCO2 BLDV: 41 MMHG — LOW (ref 42–55)
PH BLDV: 7.37 — SIGNIFICANT CHANGE UP (ref 7.32–7.43)
PH BLDV: 7.38 — SIGNIFICANT CHANGE UP (ref 7.32–7.43)
PHOSPHATE SERPL-MCNC: 3.7 MG/DL — SIGNIFICANT CHANGE UP (ref 2.5–4.5)
PLATELET # BLD AUTO: 119 K/UL — LOW (ref 150–400)
PO2 BLDV: 38 MMHG — SIGNIFICANT CHANGE UP (ref 25–45)
PO2 BLDV: 40 MMHG — SIGNIFICANT CHANGE UP (ref 25–45)
PO2 BLDV: 41 MMHG — SIGNIFICANT CHANGE UP (ref 25–45)
POTASSIUM BLDV-SCNC: 3.7 MMOL/L — SIGNIFICANT CHANGE UP (ref 3.5–5.1)
POTASSIUM SERPL-MCNC: 3.8 MMOL/L — SIGNIFICANT CHANGE UP (ref 3.5–5.3)
POTASSIUM SERPL-SCNC: 3.8 MMOL/L — SIGNIFICANT CHANGE UP (ref 3.5–5.3)
PROT SERPL-MCNC: 6.3 G/DL — SIGNIFICANT CHANGE UP (ref 6–8.3)
PROTHROM AB SERPL-ACNC: 12.5 SEC — SIGNIFICANT CHANGE UP (ref 10.5–13.4)
RBC # BLD: 3.15 M/UL — LOW (ref 4.2–5.8)
RBC # FLD: 17.6 % — HIGH (ref 10.3–14.5)
SAO2 % BLDV: 63.6 % — LOW (ref 67–88)
SAO2 % BLDV: 64.1 % — LOW (ref 67–88)
SAO2 % BLDV: 66.7 % — LOW (ref 67–88)
SAO2 % BLDV: 67.2 % — SIGNIFICANT CHANGE UP (ref 67–88)
SODIUM SERPL-SCNC: 135 MMOL/L — SIGNIFICANT CHANGE UP (ref 135–145)
SPECIMEN SOURCE: SIGNIFICANT CHANGE UP
VANCOMYCIN TROUGH SERPL-MCNC: 34.3 UG/ML — CRITICAL HIGH (ref 10–20)
WBC # BLD: 15.4 K/UL — HIGH (ref 3.8–10.5)
WBC # FLD AUTO: 15.4 K/UL — HIGH (ref 3.8–10.5)

## 2022-12-14 PROCEDURE — 99292 CRITICAL CARE ADDL 30 MIN: CPT

## 2022-12-14 PROCEDURE — 99232 SBSQ HOSP IP/OBS MODERATE 35: CPT

## 2022-12-14 PROCEDURE — 93308 TTE F-UP OR LMTD: CPT | Mod: 26

## 2022-12-14 PROCEDURE — 99233 SBSQ HOSP IP/OBS HIGH 50: CPT | Mod: GC

## 2022-12-14 PROCEDURE — 93321 DOPPLER ECHO F-UP/LMTD STD: CPT | Mod: 26

## 2022-12-14 PROCEDURE — 99233 SBSQ HOSP IP/OBS HIGH 50: CPT

## 2022-12-14 PROCEDURE — 71045 X-RAY EXAM CHEST 1 VIEW: CPT | Mod: 26

## 2022-12-14 PROCEDURE — 99291 CRITICAL CARE FIRST HOUR: CPT

## 2022-12-14 RX ORDER — MEPERIDINE HYDROCHLORIDE 50 MG/ML
25 INJECTION INTRAMUSCULAR; INTRAVENOUS; SUBCUTANEOUS ONCE
Refills: 0 | Status: DISCONTINUED | OUTPATIENT
Start: 2022-12-14 | End: 2022-12-14

## 2022-12-14 RX ORDER — VASOPRESSIN 20 [USP'U]/ML
0.02 INJECTION INTRAVENOUS
Qty: 40 | Refills: 0 | Status: DISCONTINUED | OUTPATIENT
Start: 2022-12-14 | End: 2022-12-16

## 2022-12-14 RX ORDER — HEPARIN SODIUM 5000 [USP'U]/ML
300 INJECTION INTRAVENOUS; SUBCUTANEOUS
Qty: 10000 | Refills: 0 | Status: DISCONTINUED | OUTPATIENT
Start: 2022-12-14 | End: 2022-12-15

## 2022-12-14 RX ORDER — MAGNESIUM SULFATE 500 MG/ML
2 VIAL (ML) INJECTION ONCE
Refills: 0 | Status: COMPLETED | OUTPATIENT
Start: 2022-12-14 | End: 2022-12-14

## 2022-12-14 RX ORDER — IPRATROPIUM/ALBUTEROL SULFATE 18-103MCG
3 AEROSOL WITH ADAPTER (GRAM) INHALATION EVERY 6 HOURS
Refills: 0 | Status: DISCONTINUED | OUTPATIENT
Start: 2022-12-14 | End: 2022-12-16

## 2022-12-14 RX ORDER — POTASSIUM CHLORIDE 20 MEQ
10 PACKET (EA) ORAL ONCE
Refills: 0 | Status: COMPLETED | OUTPATIENT
Start: 2022-12-14 | End: 2022-12-14

## 2022-12-14 RX ADMIN — Medication 1: at 21:59

## 2022-12-14 RX ADMIN — Medication 3 MILLILITER(S): at 23:56

## 2022-12-14 RX ADMIN — AMIODARONE HYDROCHLORIDE 400 MILLIGRAM(S): 400 TABLET ORAL at 14:22

## 2022-12-14 RX ADMIN — VASOPRESSIN 3 UNIT(S)/MIN: 20 INJECTION INTRAVENOUS at 22:14

## 2022-12-14 RX ADMIN — AMIODARONE HYDROCHLORIDE 400 MILLIGRAM(S): 400 TABLET ORAL at 21:42

## 2022-12-14 RX ADMIN — MEPERIDINE HYDROCHLORIDE 25 MILLIGRAM(S): 50 INJECTION INTRAMUSCULAR; INTRAVENOUS; SUBCUTANEOUS at 22:38

## 2022-12-14 RX ADMIN — Medication 5 MILLIGRAM(S): at 21:42

## 2022-12-14 RX ADMIN — Medication 25 GRAM(S): at 04:37

## 2022-12-14 RX ADMIN — MEROPENEM 100 MILLIGRAM(S): 1 INJECTION INTRAVENOUS at 06:51

## 2022-12-14 RX ADMIN — ATORVASTATIN CALCIUM 80 MILLIGRAM(S): 80 TABLET, FILM COATED ORAL at 21:42

## 2022-12-14 RX ADMIN — FLUCONAZOLE 100 MILLIGRAM(S): 150 TABLET ORAL at 05:38

## 2022-12-14 RX ADMIN — PANTOPRAZOLE SODIUM 40 MILLIGRAM(S): 20 TABLET, DELAYED RELEASE ORAL at 12:20

## 2022-12-14 RX ADMIN — Medication 3 MILLILITER(S): at 11:29

## 2022-12-14 RX ADMIN — MILRINONE LACTATE 2.79 MICROGRAM(S)/KG/MIN: 1 INJECTION, SOLUTION INTRAVENOUS at 22:15

## 2022-12-14 RX ADMIN — AMIODARONE HYDROCHLORIDE 400 MILLIGRAM(S): 400 TABLET ORAL at 05:25

## 2022-12-14 RX ADMIN — Medication 15 MILLILITER(S): at 12:19

## 2022-12-14 RX ADMIN — MEPERIDINE HYDROCHLORIDE 25 MILLIGRAM(S): 50 INJECTION INTRAMUSCULAR; INTRAVENOUS; SUBCUTANEOUS at 22:53

## 2022-12-14 RX ADMIN — Medication 100 MILLIGRAM(S): at 12:19

## 2022-12-14 RX ADMIN — CHLORHEXIDINE GLUCONATE 15 MILLILITER(S): 213 SOLUTION TOPICAL at 05:25

## 2022-12-14 RX ADMIN — Medication 50 MILLIEQUIVALENT(S): at 03:40

## 2022-12-14 RX ADMIN — POLYETHYLENE GLYCOL 3350 17 GRAM(S): 17 POWDER, FOR SOLUTION ORAL at 17:47

## 2022-12-14 RX ADMIN — POLYETHYLENE GLYCOL 3350 17 GRAM(S): 17 POWDER, FOR SOLUTION ORAL at 05:25

## 2022-12-14 RX ADMIN — SENNA PLUS 2 TABLET(S): 8.6 TABLET ORAL at 21:42

## 2022-12-14 RX ADMIN — Medication 3 MILLILITER(S): at 17:08

## 2022-12-14 RX ADMIN — MEROPENEM 100 MILLIGRAM(S): 1 INJECTION INTRAVENOUS at 17:46

## 2022-12-14 RX ADMIN — Medication 5 MILLIGRAM(S): at 00:32

## 2022-12-14 RX ADMIN — CHLORHEXIDINE GLUCONATE 1 APPLICATION(S): 213 SOLUTION TOPICAL at 05:25

## 2022-12-14 RX ADMIN — CHLORHEXIDINE GLUCONATE 15 MILLILITER(S): 213 SOLUTION TOPICAL at 17:47

## 2022-12-14 RX ADMIN — Medication 5 MILLIGRAM(S): at 14:22

## 2022-12-14 RX ADMIN — Medication 1: at 18:38

## 2022-12-14 RX ADMIN — Medication 1: at 09:54

## 2022-12-14 RX ADMIN — Medication 5 MILLIGRAM(S): at 05:25

## 2022-12-14 NOTE — PROGRESS NOTE ADULT - PROBLEM SELECTOR PLAN 4
- ECMO decannulated 12/08 after successful wean  - IABP in place, milrinone 0.1, on 1:1, wean as able, target augmented MAP 70s though will defer afterload agent titration while there is c/f developing sepsis  - cont hep gtt for AC while on IABP.  - repeat limited TTE to assess for degree of MR with and without IABP support today

## 2022-12-14 NOTE — PROGRESS NOTE ADULT - ASSESSMENT
61 YO M with a history of CAD s/p 4v CABG ~2019 in [facility], DM2 (A1c 7.3%), and HTN who initially presented to OSH with abdominal pain and n/v and found to have pancreatitis with lipase > 3000 though also with elevated troponins. CT abdomen revealed acute pancreatitis and his initial LVEF was 40-45%. He developed MSOF with hypoxic respiratory failure requiring intubation and ERIC and went into hypoxic PEA arrest requiring ACLS and due to persistent hypoxia and hypotension on pressors after ROSC was cannulated to [facility] ECMO (LFV, RFA, no anterograde perfusion catheter) on 11/25. Notably CTH that day revealed small subdural hemorrhage.     His post arrest TTE on 11/26 showed a signficantly worse LVEF of 5-10% with an AV that was only minimally opening. Since then he has had improvement in his LV function (now ~35-40%) and improved pulsatility while tolerating progressive slow ECMO weans. ECMO turndown (note in chart 11/30) was reassuring for ability to decannulate to IABP/inotropes. LHC 12/06 showed closure of his 3 vein grafts with graft to LAD patent though with distal native disease. IABP placed, ECMO successfully decannulated 12/08 (transfused 2u pRBCs during). His ARDS is improving. Currently on CVVH.    Previous cardiac studies:  LHC (12/06/22) - patent LIMA-LAD, RCA , LCx , aortogram w/ closure of 3 vein grafts, LVEDP 22 mm Hg, left-to-left and left-to-right collaterals  TTE (12/03/22) - mod-to-sev segmental LVSD (inferolateral, inferior, inferoseptal hypokinesis)    Hemodynamics:  12/13 IABP 1:1, milrinone 0.1, aDBP 101, aMAP 85, CVP 5, lactate 1.1, mVO2 71.2%, CO 7.7, CI 3.4,   12/12 IABP 1:1, aDBP 103, aMAP 84, CVP 7, lactate 1.3, mVO2 79.3%, CO 10.5, CI 5.0,   12/09 IABP 1:1, aDBP 126, aMAP 92, CVP 6, lactate 1.2  12/08 IABP 1:1, aDBP 120, aMAP 87, CVP 5  12/07 IABP 1:1 ECMO 3600 RPM 4.0 LPM, aMAP 90, aDBP 123, mVO2 66.8%, CVP 7, CO 6.8, CI 3.3    *** INCOMPLETE NOTE *** INCOMPLETE NOTE *** INCOMPLETE NOTE *** INCOMPLETE NOTE *** INCOMPLETE NOTE *** INCOMPLETE NOTE *** INCOMPLETE NOTE *** INCOMPLETE NOTE *** INCOMPLETE NOTE *** INCOMPLETE NOTE *** INCOMPLETE NOTE *** INCOMPLETE NOTE *** INCOMPLETE NOTE *** INCOMPLETE NOTE *** INCOMPLETE NOTE *** INCOMPLETE NOTE *** INCOMPLETE NOTE *** INCOMPLETE NOTE *** INCOMPLETE NOTE *** INCOMPLETE NOTE ***  RECS BELOW ARE NOT TO BE FOLLOWED UNTIL FINALIZED  amio 400 q8h, atorva 80, CRRT, dexmed gtt, hep gtt, mil 0.1  zaina, vanc      Problem/Plan - 1:  ·  Problem: Non-ST elevation MI (NSTEMI).   ·  Plan:   - troponin peaked at > 33706   - [facility] 12/06 with IABP placement revealed that 3 grafts are closed w/ distal native disease from remaining LAD graft  - continue statin  - would start ASA when okay with surgical team.    Problem/Plan - 2:  ·  Problem: Gall stone pancreatitis.   ·  Plan:   - CT abd showing acute pancreatitis  - RUQ showing sludge, no stones  - lipase > 3000 11/24, normalized prior and now fluctuating  - conservative care  - appreciate GI recs, no intervention planned   - surgery following   - appreciate ID recs.    Problem/Plan - 3:  ·  Problem: ERIC (acute kidney injury).   ·  Plan:   - likely arrest associated ATN, hypovolemia   - renal following, c/t CVVH.    Problem/Plan - 4:  ·  Problem: Cardiogenic shock.   ·  Plan:   - ECMO decannulated 12/08 after successful wean  - IABP in place, milrinone 0.1, keep 1:1, target augmented MAP 70s though will defer afterload agent titration while there is c/f developing sepsis  - cont hep gtt for AC while on IABP.    Problem/Plan - 5:  ·  Problem: Nontraumatic subdural hematoma.   ·  Plan:   - small 3mm subdural hemorrhage stable on repeat CTs  - okay to continue heparin, appreciate neuro recs  - keep MAP < 75 mmHg and careful monitoring of PTT.    Problem/Plan - 6:  ·  Problem: Acute respiratory failure with hypoxia.   ·  Plan:   - CXR improved with more lung volumes after bronch and increased PEEP  - bronch showed erythematous airways with scant bleeding  - extubate when able  - continue treatment for pancreatitis related ARDS.    Problem/Plan - 7:  ·  Problem: Supraventricular tachycardia.   ·  Plan:   - on review of tele, it appears to be sinus tach  - consider stopping amio.    Problem/Plan - 8:  ·  Problem: Fever.   ·  Plan:  - f/u ID recs, multiple potential sources given recent lines/procedures, as well as possible biliary source  - consider removing non-vital lines  - f/u pan cultures and c/t abx.      Patient remains critically ill. Currently supported with milrinone 0.1 and IABP 1:1 with adequate central sats. tomorrow will attempt to wean the IABP and assess HD response.     61 YO M with a history of CAD s/p 4v CABG ~2019 in [facility], DM2 (A1c 7.3%), and HTN who initially presented to OSH with abdominal pain and n/v and found to have pancreatitis with lipase > 3000 though also with elevated troponins. CT abdomen revealed acute pancreatitis and his initial LVEF was 40-45%. He developed MSOF with hypoxic respiratory failure requiring intubation and ERIC and went into hypoxic PEA arrest requiring ACLS and due to persistent hypoxia and hypotension on pressors after ROSC was cannulated to VA ECMO (LFV, RFA, no anterograde perfusion catheter) on 11/25. Notably CTH that day revealed small subdural hemorrhage.     His post arrest TTE on 11/26 showed a signficantly worse LVEF of 5-10% with an AV that was only minimally opening. Since then he has had improvement in his LV function (now ~35-40%) and improved pulsatility while tolerating progressive slow ECMO weans. ECMO turndown (note in chart 11/30) was reassuring for ability to decannulate to IABP/inotropes. LHC 12/06 showed closure of his 3 vein grafts with graft to LAD patent though with distal native disease. IABP placed, ECMO successfully decannulated 12/08 (transfused 2u pRBCs during). His ARDS is improving. Currently on CVVH.    Previous cardiac studies:  LHC (12/06/22) - patent LIMA-LAD, RCA , LCx , aortogram w/ closure of 3 vein grafts, LVEDP 22 mm Hg, left-to-left and left-to-right collaterals  TTE (12/03/22) - mod-to-sev segmental LVSD (inferolateral, inferior, inferoseptal hypokinesis)    Hemodynamics:  12/13 IABP 1:1, milrinone 0.1, aDBP 101, aMAP 85, CVP 5, lactate 1.1, mVO2 71.2%, CO 7.7, CI 3.4,   12/12 IABP 1:1, aDBP 103, aMAP 84, CVP 7, lactate 1.3, mVO2 79.3%, CO 10.5, CI 5.0,   12/09 IABP 1:1, aDBP 126, aMAP 92, CVP 6, lactate 1.2  12/08 IABP 1:1, aDBP 120, aMAP 87, CVP 5  12/07 IABP 1:1 ECMO 3600 RPM 4.0 LPM, aMAP 90, aDBP 123, mVO2 66.8%, CVP 7, CO 6.8, CI 3.3    *** INCOMPLETE NOTE *** INCOMPLETE NOTE *** INCOMPLETE NOTE *** INCOMPLETE NOTE *** INCOMPLETE NOTE *** INCOMPLETE NOTE *** INCOMPLETE NOTE *** INCOMPLETE NOTE *** INCOMPLETE NOTE *** INCOMPLETE NOTE *** INCOMPLETE NOTE *** INCOMPLETE NOTE *** INCOMPLETE NOTE *** INCOMPLETE NOTE *** INCOMPLETE NOTE *** INCOMPLETE NOTE *** INCOMPLETE NOTE *** INCOMPLETE NOTE *** INCOMPLETE NOTE *** INCOMPLETE NOTE ***  RECS BELOW ARE NOT TO BE FOLLOWED UNTIL FINALIZED  amio 400 q8h, atorva 80, CRRT, dexmed gtt, hep gtt, mil 0.1  zaina, vanc      Problem/Plan - 1:  ·  Problem: Non-ST elevation MI (NSTEMI).   ·  Plan:   - troponin peaked at > 26635   - [facility] 12/06 with IABP placement revealed that 3 grafts are closed w/ distal native disease from remaining LAD graft  - continue statin  - would start ASA when okay with surgical team.    Problem/Plan - 2:  ·  Problem: Gall stone pancreatitis.   ·  Plan:   - CT abd showing acute pancreatitis  - RUQ showing sludge, no stones  - lipase > 3000 11/24, normalized prior and now fluctuating  - conservative care  - appreciate GI recs, no intervention planned   - surgery following   - appreciate ID recs.    Problem/Plan - 3:  ·  Problem: ERIC (acute kidney injury).   ·  Plan:   - likely arrest associated ATN, hypovolemia   - renal following, c/t CVVH.    Problem/Plan - 4:  ·  Problem: Cardiogenic shock.   ·  Plan:   - ECMO decannulated 12/08 after successful wean  - IABP in place, milrinone 0.1, keep 1:1, target augmented MAP 70s though will defer afterload agent titration while there is c/f developing sepsis  - cont hep gtt for AC while on IABP.    Problem/Plan - 5:  ·  Problem: Nontraumatic subdural hematoma.   ·  Plan:   - small 3mm subdural hemorrhage stable on repeat CTs  - okay to continue heparin, appreciate neuro recs  - keep MAP < 75 mmHg and careful monitoring of PTT.    Problem/Plan - 6:  ·  Problem: Acute respiratory failure with hypoxia.   ·  Plan:   - CXR improved with more lung volumes after bronch and increased PEEP  - bronch showed erythematous airways with scant bleeding  - extubate when able  - continue treatment for pancreatitis related ARDS.    Problem/Plan - 7:  ·  Problem: Supraventricular tachycardia.   ·  Plan:   - on review of tele, it appears to be sinus tach  - consider stopping amio.    Problem/Plan - 8:  ·  Problem: Fever.   ·  Plan:  - f/u ID recs, multiple potential sources given recent lines/procedures, as well as possible biliary source  - consider removing non-vital lines  - f/u pan cultures and c/t abx.      Patient remains critically ill. Currently supported with milrinone 0.1 and IABP 1:1 with adequate central sats. tomorrow will attempt to wean the IABP and assess HD response.     63 YO M with a history of CAD s/p 4v CABG ~2019 in [facility], DM2 (A1c 7.3%), and HTN who initially presented to OSH with abdominal pain and n/v and found to have pancreatitis with lipase > 3000 though also with elevated troponins. CT abdomen revealed acute pancreatitis and his initial LVEF was 40-45%. He developed MSOF with hypoxic respiratory failure requiring intubation and ERIC and went into hypoxic PEA arrest requiring ACLS and due to persistent hypoxia and hypotension on pressors after ROSC was cannulated to [facility] ECMO (LFV, RFA, no anterograde perfusion catheter) on 11/25. Notably CTH that day revealed small subdural hemorrhage.     His post arrest TTE on 11/26 showed a signficantly worse LVEF of 5-10% with an AV that was only minimally opening. Since then he has had improvement in his LV function (now ~35-40%) and improved pulsatility while tolerating progressive slow ECMO weans. ECMO turndown (note in chart 11/30) was reassuring for ability to decannulate to IABP/inotropes. LHC 12/06 showed closure of his 3 vein grafts with graft to LAD patent though with distal native disease. IABP placed, ECMO successfully decannulated 12/08 (transfused 2u pRBCs during). His ARDS is improving. Currently on CVVH.    Previous cardiac studies:  LHC (12/06/22) - patent LIMA-LAD, RCA , LCx , aortogram w/ closure of 3 vein grafts, LVEDP 22 mm Hg, left-to-left and left-to-right collaterals  TTE (12/03/22) - mod-to-sev segmental LVSD (inferolateral, inferior, inferoseptal hypokinesis)    Hemodynamics:  12/13 IABP 1:1, milrinone 0.1, aDBP 101, aMAP 85, CVP 5, lactate 1.1, mVO2 71.2%, CO 7.7, CI 3.4,   12/12 IABP 1:1, aDBP 103, aMAP 84, CVP 7, lactate 1.3, mVO2 79.3%, CO 10.5, CI 5.0,   12/09 IABP 1:1, aDBP 126, aMAP 92, CVP 6, lactate 1.2  12/08 IABP 1:1, aDBP 120, aMAP 87, CVP 5  12/07 IABP 1:1 ECMO 3600 RPM 4.0 LPM, aMAP 90, aDBP 123, mVO2 66.8%, CVP 7, CO 6.8, CI 3.3    *** INCOMPLETE NOTE *** INCOMPLETE NOTE *** INCOMPLETE NOTE *** INCOMPLETE NOTE *** INCOMPLETE NOTE *** INCOMPLETE NOTE *** INCOMPLETE NOTE *** INCOMPLETE NOTE *** INCOMPLETE NOTE *** INCOMPLETE NOTE *** INCOMPLETE NOTE *** INCOMPLETE NOTE *** INCOMPLETE NOTE *** INCOMPLETE NOTE *** INCOMPLETE NOTE *** INCOMPLETE NOTE *** INCOMPLETE NOTE *** INCOMPLETE NOTE *** INCOMPLETE NOTE *** INCOMPLETE NOTE ***  RECS BELOW ARE NOT TO BE FOLLOWED UNTIL FINALIZED  amio 400 q8h, atorva 80, CRRT, dexmed gtt, hep gtt, mil 0.1  zaina, vanc      Problem/Plan - 1:  ·  Problem: Non-ST elevation MI (NSTEMI).   ·  Plan:   - troponin peaked at > 53756   - [facility] 12/06 with IABP placement revealed that 3 grafts are closed w/ distal native disease from remaining LAD graft  - continue statin  - would start ASA when okay with surgical team.    Problem/Plan - 2:  ·  Problem: Gall stone pancreatitis.   ·  Plan:   - CT abd showing acute pancreatitis  - RUQ showing sludge, no stones  - lipase > 3000 11/24, normalized prior and now fluctuating  - conservative care  - appreciate GI recs, no intervention planned   - surgery following   - appreciate ID recs.    Problem/Plan - 3:  ·  Problem: ERIC (acute kidney injury).   ·  Plan:   - likely arrest associated ATN, hypovolemia   - renal following, c/t CVVH.    Problem/Plan - 4:  ·  Problem: Cardiogenic shock.   ·  Plan:   - ECMO decannulated 12/08 after successful wean  - IABP in place, milrinone 0.1, keep 1:1, target augmented MAP 70s though will defer afterload agent titration while there is c/f developing sepsis  - cont hep gtt for AC while on IABP.    Problem/Plan - 5:  ·  Problem: Nontraumatic subdural hematoma.   ·  Plan:   - small 3mm subdural hemorrhage stable on repeat CTs  - okay to continue heparin, appreciate neuro recs  - keep MAP < 75 mmHg and careful monitoring of PTT.    Problem/Plan - 6:  ·  Problem: Acute respiratory failure with hypoxia.   ·  Plan:   - CXR improved with more lung volumes after bronch and increased PEEP  - bronch showed erythematous airways with scant bleeding  - extubate when able  - continue treatment for pancreatitis related ARDS.    Problem/Plan - 7:  ·  Problem: Supraventricular tachycardia.   ·  Plan:   - on review of tele, it appears to be sinus tach  - consider stopping amio.    Problem/Plan - 8:  ·  Problem: Fever.   ·  Plan:  - f/u ID recs, multiple potential sources given recent lines/procedures, as well as possible biliary source  - consider removing non-vital lines  - f/u pan cultures and c/t abx.      Patient remains critically ill. Currently supported with milrinone 0.1 and IABP 1:1 with adequate central sats. tomorrow will attempt to wean the IABP and assess HD response.     63 YO M with a history of CAD s/p 4v CABG ~2019 in [facility], DM2 (A1c 7.3%), and HTN who initially presented to OSH with abdominal pain and n/v and found to have pancreatitis with lipase > 3000 though also with elevated troponins. CT abdomen revealed acute pancreatitis and his initial LVEF was 40-45%. He developed MSOF with hypoxic respiratory failure requiring intubation and ERIC and went into hypoxic PEA arrest requiring ACLS and due to persistent hypoxia and hypotension on pressors after ROSC was cannulated to VA ECMO (LFV, RFA, no anterograde perfusion catheter) on 11/25. Notably CTH that day revealed small subdural hemorrhage.     His post arrest TTE on 11/26 showed a signficantly worse LVEF of 5-10% with an AV that was only minimally opening. Since then he has had improvement in his LV function (now ~35-40%) and improved pulsatility while tolerating progressive slow ECMO weans. ECMO turndown (note in chart 11/30) was reassuring for ability to decannulate to IABP/inotropes. LHC 12/06 showed closure of his 3 vein grafts with graft to LAD patent though with distal native disease. IABP placed, ECMO successfully decannulated 12/08 (transfused 2u pRBCs during). His ARDS is improving. Currently on CVVH.    Previous cardiac studies:  LHC (12/06/22) - patent LIMA-LAD, RCA , LCx , aortogram w/ closure of 3 vein grafts, LVEDP 22 mm Hg, left-to-left and left-to-right collaterals  TTE (12/03/22) - mod-to-sev segmental LVSD (inferolateral, inferior, inferoseptal hypokinesis)    Hemodynamics:  12/14 IABP 1:1, milrinone 0.1, aDBP 105, aMAP83, lactate 0.8, jLN945.6%, CO 5.9, CI 2.7, SVR 1000,  1.0  12/13 IABP 1:1, milrinone 0.1, aDBP 101, aMAP 85, CVP 5, lactate 1.1, mVO2 71.2%, CO 7.7, CI 3.4,   12/12 IABP 1:1, aDBP 103, aMAP 84, CVP 7, lactate 1.3, mVO2 79.3%, CO 10.5, CI 5.0,   12/09 IABP 1:1, aDBP 126, aMAP 92, CVP 6, lactate 1.2  12/08 IABP 1:1, aDBP 120, aMAP 87, CVP 5  12/07 IABP 1:1 ECMO 3600 RPM 4.0 LPM, aMAP 90, aDBP 123, mVO2 66.8%, CVP 7, CO 6.8, CI 3.3 63 YO M with a history of CAD s/p 4v CABG ~2019 in [facility], DM2 (A1c 7.3%), and HTN who initially presented to OSH with abdominal pain and n/v and found to have pancreatitis with lipase > 3000 though also with elevated troponins. CT abdomen revealed acute pancreatitis and his initial LVEF was 40-45%. He developed MSOF with hypoxic respiratory failure requiring intubation and ERIC and went into hypoxic PEA arrest requiring ACLS and due to persistent hypoxia and hypotension on pressors after ROSC was cannulated to VA ECMO (LFV, RFA, no anterograde perfusion catheter) on 11/25. Notably CTH that day revealed small subdural hemorrhage.     His post arrest TTE on 11/26 showed a signficantly worse LVEF of 5-10% with an AV that was only minimally opening. Since then he has had improvement in his LV function (now ~35-40%) and improved pulsatility while tolerating progressive slow ECMO weans. ECMO turndown (note in chart 11/30) was reassuring for ability to decannulate to IABP/inotropes. LHC 12/06 showed closure of his 3 vein grafts with graft to LAD patent though with distal native disease. IABP placed, ECMO successfully decannulated 12/08 (transfused 2u pRBCs during). His ARDS is improving. Currently on CVVH.    Previous cardiac studies:  LHC (12/06/22) - patent LIMA-LAD, RCA , LCx , aortogram w/ closure of 3 vein grafts, LVEDP 22 mm Hg, left-to-left and left-to-right collaterals  TTE (12/03/22) - mod-to-sev segmental LVSD (inferolateral, inferior, inferoseptal hypokinesis)    Hemodynamics:  12/14 IABP 1:1, milrinone 0.1, aDBP 105, aMAP83, lactate 0.8, bQW761.6%, CO 5.9, CI 2.7, SVR 1000,  1.0  12/13 IABP 1:1, milrinone 0.1, aDBP 101, aMAP 85, CVP 5, lactate 1.1, mVO2 71.2%, CO 7.7, CI 3.4,   12/12 IABP 1:1, aDBP 103, aMAP 84, CVP 7, lactate 1.3, mVO2 79.3%, CO 10.5, CI 5.0,   12/09 IABP 1:1, aDBP 126, aMAP 92, CVP 6, lactate 1.2  12/08 IABP 1:1, aDBP 120, aMAP 87, CVP 5  12/07 IABP 1:1 ECMO 3600 RPM 4.0 LPM, aMAP 90, aDBP 123, mVO2 66.8%, CVP 7, CO 6.8, CI 3.3 63 YO M with a [facility] of [facility] DM2 (A1c 7.3%), and HTN who initially presented to OSH with abdominal pain and n/v and found to have pancreatitis with lipase > 3000 though also with elevated troponins. CT abdomen revealed acute pancreatitis and his initial LVEF was 40-45%. He developed MSOF with hypoxic respiratory failure requiring intubation and ERIC and went into hypoxic PEA arrest requiring ACLS and due to persistent hypoxia and hypotension on pressors after ROSC was cannulated to VA ECMO (LFV, RFA, no anterograde perfusion catheter) on 11/25. Notably CTH that day revealed small subdural hemorrhage.     His post arrest TTE on 11/26 showed a signficantly worse LVEF of 5-10% with an AV that was only minimally opening. Since then he has had improvement in his LV function (now ~35-40%) and improved pulsatility while tolerating progressive slow ECMO weans. ECMO turndown (note in chart 11/30) was reassuring for ability to decannulate to IABP/inotropes. LHC 12/06 showed closure of his 3 vein grafts with graft to LAD patent though with distal native disease. IABP placed, ECMO successfully decannulated 12/08 (transfused 2u pRBCs during). His ARDS is improving. Currently on CVVH.    Previous cardiac studies:  LHC (12/06/22) - patent LIMA-LAD, RCA , LCx , aortogram w/ closure of 3 vein grafts, LVEDP 22 mm Hg, left-to-left and left-to-right collaterals  TTE (12/03/22) - mod-to-sev segmental LVSD (inferolateral, inferior, inferoseptal hypokinesis)    Hemodynamics:  12/14 IABP 1:1, milrinone 0.1, aDBP 105, aMAP83, lactate 0.8, bQY658.6%, CO 5.9, CI 2.7, SVR 1000,  1.0  12/13 IABP 1:1, milrinone 0.1, aDBP 101, aMAP 85, CVP 5, lactate 1.1, mVO2 71.2%, CO 7.7, CI 3.4,   12/12 IABP 1:1, aDBP 103, aMAP 84, CVP 7, lactate 1.3, mVO2 79.3%, CO 10.5, CI 5.0,   12/09 IABP 1:1, aDBP 126, aMAP 92, CVP 6, lactate 1.2  12/08 IABP 1:1, aDBP 120, aMAP 87, CVP 5  12/07 IABP 1:1 ECMO 3600 RPM 4.0 LPM, aMAP 90, aDBP 123, mVO2 66.8%, CVP 7, CO 6.8, CI 3.3

## 2022-12-14 NOTE — PROGRESS NOTE ADULT - ATTENDING COMMENTS
Patient remains critically ill. Currently supported with milrinone 0.1 and IABP 1:1 with adequate central sats. Case has been discussed with structural heart team, plan is for tentative mitral clip friday

## 2022-12-14 NOTE — PROGRESS NOTE ADULT - ASSESSMENT
62M CAD, CABG, DM, HTN, chol admitted with pancreatitis not necrotizing on the CT scan and cholecystitis.  Sent to SICU but developed respiratory failure presumed to be due to ARDS. Moved to the CTU for ECMO for cardiogenic shock vs ARDS.  ECMO out, IABP placed.  Leukocytosis    Fever, rising leucocytosis, SIRS/sepsis, ERIC  continue meropenem vanco  level ok    diflucan added   - repeat UC and BC sent    ct scan with improving pancreatitis      cultures are negative  would complete antibiotics'    to complete antibiotics on 12/16.   discussed with ctu

## 2022-12-14 NOTE — PROGRESS NOTE ADULT - SUBJECTIVE AND OBJECTIVE BOX
ENT ISSUE/POD: evaluation for tracheostomy    HPI: 61 y/o male with PMH of CABG, DM, HTN, HLD presenting as transfer from [place] for STEMI. Course c/b gallstone pancreatitis leading to ARDS, shock, cardiac arrest then placed on [facility] ECMO. Pt underwent ECMO decannulation. Known to ENT for evaluation of a soft palate laceration. ENT consulted today for evaluation of a tracheostomy for respiratory failure. Patient is intubated and awake/alert, follows commands. Pt denies fever, chills, n/v, HA, SOB, dizziness, or changes in hearing.          PAST MEDICAL & SURGICAL HISTORY:    Allergies    No Known Allergies    Intolerances      MEDICATIONS  (STANDING):  albuterol/ipratropium for Nebulization 3 milliLiter(s) Nebulizer every 6 hours  aMIOdarone    Tablet   Oral   aMIOdarone    Tablet 400 milliGRAM(s) Oral every 8 hours  atorvastatin 80 milliGRAM(s) Oral at bedtime  chlorhexidine 0.12% Liquid 15 milliLiter(s) Oral Mucosa every 12 hours  chlorhexidine 2% Cloths 1 Application(s) Topical <User Schedule>  CRRT Treatment    <Continuous>  dexMEDEtomidine Infusion 0.3 MICROgram(s)/kG/Hr (6.97 mL/Hr) IV Continuous <Continuous>  fluconAZOLE IVPB      fluconAZOLE IVPB 200 milliGRAM(s) IV Intermittent every 24 hours  heparin  Infusion 800 Unit(s)/Hr (16 mL/Hr) IV Continuous <Continuous>  heparin  Infusion Syringe 300 Unit(s)/Hr (0.6 mL/Hr) CRRT <Continuous>  insulin lispro (ADMELOG) corrective regimen sliding scale   SubCutaneous every 4 hours  meropenem  IVPB 1000 milliGRAM(s) IV Intermittent every 12 hours  metoclopramide Injectable 5 milliGRAM(s) IV Push every 8 hours  milrinone Infusion 0.1 MICROgram(s)/kG/Min (2.79 mL/Hr) IV Continuous <Continuous>  multivitamin/minerals/iron Oral Solution (CENTRUM) 15 milliLiter(s) Oral daily  pantoprazole  Injectable 40 milliGRAM(s) IV Push daily  Phoxillum Filtration BK 4 / 2.5 5000 milliLiter(s) (800 mL/Hr) CRRT <Continuous>  Phoxillum Filtration BK 4 / 2.5 5000 milliLiter(s) (2200 mL/Hr) CRRT <Continuous>  polyethylene glycol 3350 17 Gram(s) Oral two times a day  PrismaSOL Filtration BGK 4 / 2.5 5000 milliLiter(s) (200 mL/Hr) CRRT <Continuous>  senna 2 Tablet(s) Oral at bedtime  sodium chloride 0.9%. 1000 milliLiter(s) (5 mL/Hr) IV Continuous <Continuous>  thiamine 100 milliGRAM(s) Enteral Tube daily  vancomycin  IVPB 1000 milliGRAM(s) IV Intermittent every 24 hours    MEDICATIONS  (PRN):  acetaminophen    Suspension .. 650 milliGRAM(s) Enteral Tube every 6 hours PRN Temp greater or equal to 38C (100.4F), Mild Pain (1 - 3)  lidocaine   4% Patch 1 Patch Transdermal daily PRN pain      Social History: see consult    Family history: see consult    ROS:   ENT: all negative except as noted in HPI   Pulm: denies SOB, cough, hemoptysis  Neuro: denies numbness/tingling, loss of sensation  Endo: denies heat/cold intolerance, excessive sweating      Vital Signs Last 24 Hrs  T(C): 37.6 (14 Dec 2022 16:00), Max: 37.8 (14 Dec 2022 12:00)  T(F): 99.7 (14 Dec 2022 16:00), Max: 100 (14 Dec 2022 12:00)  HR: 96 (14 Dec 2022 17:18) (96 - 118)  BP: --  BP(mean): --  RR: 29 (14 Dec 2022 16:00) (12 - 29)  SpO2: 99% (14 Dec 2022 17:18) (96% - 100%)    Parameters below as of 14 Dec 2022 17:18  Patient On (Oxygen Delivery Method): ventilator                              9.2    15.40 )-----------( 119      ( 14 Dec 2022 00:34 )             29.3    12-14    135  |  102  |  22  ----------------------------<  157<H>  3.8   |  22  |  1.43<H>    Ca    8.0<L>      14 Dec 2022 00:34  Phos  3.7     12-14  Mg     2.4     12-14    TPro  6.3  /  Alb  2.9<L>  /  TBili  1.0  /  DBili  x   /  AST  33  /  ALT  14  /  AlkPhos  118  12-14   PT/INR - ( 14 Dec 2022 00:34 )   PT: 12.5 sec;   INR: 1.08 ratio         PTT - ( 14 Dec 2022 05:30 )  PTT:61.0 sec    PHYSICAL EXAM:  Gen: intubated, awake and alert  Skin: No rashes, bruises, or lesions  Head: Normocephalic, Atraumatic  Face: no edema, erythema, or fluctuance. Parotid glands soft without mass  Eyes: no scleral injection  Nose: +NGT in right nare. Nares bilaterally patent, no discharge  Mouth: No Stridor / Drooling / Trismus.  Mucosa moist, tongue/uvula midline, oropharynx clear  Neck: Flat, supple, no lymphadenopathy, trachea midline, no masses  Lymphatic: No lymphadenopathy  Resp: on vent  Neuro: facial nerve intact, no facial droop         ENT ISSUE/POD: evaluation for tracheostomy    HPI: 61 y/o male with PMH of CABG, DM, HTN, HLD presenting as transfer from [place] for STEMI. Course c/b gallstone pancreatitis leading to ARDS, shock, cardiac arrest then placed on [facility] ECMO. Pt underwent ECMO decannulation. Known to ENT for evaluation of a soft palate laceration. ENT consulted today for evaluation of a tracheostomy for respiratory failure. Patient is intubated and awake/alert, follows commands. Pt denies fever, chills, n/v, HA, SOB, dizziness, or changes in hearing.          PAST MEDICAL & SURGICAL HISTORY:    Allergies    No Known Allergies    Intolerances      MEDICATIONS  (STANDING):  albuterol/ipratropium for Nebulization 3 milliLiter(s) Nebulizer every 6 hours  aMIOdarone    Tablet   Oral   aMIOdarone    Tablet 400 milliGRAM(s) Oral every 8 hours  atorvastatin 80 milliGRAM(s) Oral at bedtime  chlorhexidine 0.12% Liquid 15 milliLiter(s) Oral Mucosa every 12 hours  chlorhexidine 2% Cloths 1 Application(s) Topical <User Schedule>  CRRT Treatment    <Continuous>  dexMEDEtomidine Infusion 0.3 MICROgram(s)/kG/Hr (6.97 mL/Hr) IV Continuous <Continuous>  fluconAZOLE IVPB      fluconAZOLE IVPB 200 milliGRAM(s) IV Intermittent every 24 hours  heparin  Infusion 800 Unit(s)/Hr (16 mL/Hr) IV Continuous <Continuous>  heparin  Infusion Syringe 300 Unit(s)/Hr (0.6 mL/Hr) CRRT <Continuous>  insulin lispro (ADMELOG) corrective regimen sliding scale   SubCutaneous every 4 hours  meropenem  IVPB 1000 milliGRAM(s) IV Intermittent every 12 hours  metoclopramide Injectable 5 milliGRAM(s) IV Push every 8 hours  milrinone Infusion 0.1 MICROgram(s)/kG/Min (2.79 mL/Hr) IV Continuous <Continuous>  multivitamin/minerals/iron Oral Solution (CENTRUM) 15 milliLiter(s) Oral daily  pantoprazole  Injectable 40 milliGRAM(s) IV Push daily  Phoxillum Filtration BK 4 / 2.5 5000 milliLiter(s) (800 mL/Hr) CRRT <Continuous>  Phoxillum Filtration BK 4 / 2.5 5000 milliLiter(s) (2200 mL/Hr) CRRT <Continuous>  polyethylene glycol 3350 17 Gram(s) Oral two times a day  PrismaSOL Filtration BGK 4 / 2.5 5000 milliLiter(s) (200 mL/Hr) CRRT <Continuous>  senna 2 Tablet(s) Oral at bedtime  sodium chloride 0.9%. 1000 milliLiter(s) (5 mL/Hr) IV Continuous <Continuous>  thiamine 100 milliGRAM(s) Enteral Tube daily  vancomycin  IVPB 1000 milliGRAM(s) IV Intermittent every 24 hours    MEDICATIONS  (PRN):  acetaminophen    Suspension .. 650 milliGRAM(s) Enteral Tube every 6 hours PRN Temp greater or equal to 38C (100.4F), Mild Pain (1 - 3)  lidocaine   4% Patch 1 Patch Transdermal daily PRN pain      Social History: see consult    Family history: see consult    ROS:   ENT: all negative except as noted in HPI   Pulm: denies SOB, cough, hemoptysis  Neuro: denies numbness/tingling, loss of sensation  Endo: denies heat/cold intolerance, excessive sweating      Vital Signs Last 24 Hrs  T(C): 37.6 (14 Dec 2022 16:00), Max: 37.8 (14 Dec 2022 12:00)  T(F): 99.7 (14 Dec 2022 16:00), Max: 100 (14 Dec 2022 12:00)  HR: 96 (14 Dec 2022 17:18) (96 - 118)  BP: --  BP(mean): --  RR: 29 (14 Dec 2022 16:00) (12 - 29)  SpO2: 99% (14 Dec 2022 17:18) (96% - 100%)    Parameters below as of 14 Dec 2022 17:18  Patient On (Oxygen Delivery Method): ventilator                              9.2    15.40 )-----------( 119      ( 14 Dec 2022 00:34 )             29.3    12-14    135  |  102  |  22  ----------------------------<  157<H>  3.8   |  22  |  1.43<H>    Ca    8.0<L>      14 Dec 2022 00:34  Phos  3.7     12-14  Mg     2.4     12-14    TPro  6.3  /  Alb  2.9<L>  /  TBili  1.0  /  DBili  x   /  AST  33  /  ALT  14  /  AlkPhos  118  12-14   PT/INR - ( 14 Dec 2022 00:34 )   PT: 12.5 sec;   INR: 1.08 ratio         PTT - ( 14 Dec 2022 05:30 )  PTT:61.0 sec    PHYSICAL EXAM:  Gen: intubated, awake and alert  Skin: No rashes, bruises, or lesions  Head: Normocephalic, Atraumatic  Face: no edema, erythema, or fluctuance. Parotid glands soft without mass  Eyes: no scleral injection  Nose: +NGT in right nare. Nares bilaterally patent, no discharge  Mouth: No Stridor / Drooling / Trismus.  Mucosa moist, tongue/uvula midline, oropharynx clear  Neck: Flat, supple, no lymphadenopathy, trachea midline, no masses  Lymphatic: No lymphadenopathy  Resp: on vent  Neuro: facial nerve intact, no facial droop         ENT ISSUE/POD: evaluation for tracheostomy    HPI: 61 y/o male with PMH of CABG, DM, HTN, HLD presenting as transfer from [place] for STEMI. Course c/b gallstone pancreatitis leading to ARDS, shock, cardiac arrest then placed on [facility] ECMO. Pt underwent ECMO decannulation. Known to ENT for evaluation of a soft palate laceration. ENT consulted today for evaluation of a tracheostomy for respiratory failure. Patient is intubated and awake/alert, follows commands. Pt denies fever, chills, n/v, HA, SOB, dizziness, or changes in hearing.          PAST MEDICAL & SURGICAL HISTORY:    Allergies    No Known Allergies    Intolerances      MEDICATIONS  (STANDING):  albuterol/ipratropium for Nebulization 3 milliLiter(s) Nebulizer every 6 hours  aMIOdarone    Tablet   Oral   aMIOdarone    Tablet 400 milliGRAM(s) Oral every 8 hours  atorvastatin 80 milliGRAM(s) Oral at bedtime  chlorhexidine 0.12% Liquid 15 milliLiter(s) Oral Mucosa every 12 hours  chlorhexidine 2% Cloths 1 Application(s) Topical <User Schedule>  CRRT Treatment    <Continuous>  dexMEDEtomidine Infusion 0.3 MICROgram(s)/kG/Hr (6.97 mL/Hr) IV Continuous <Continuous>  fluconAZOLE IVPB      fluconAZOLE IVPB 200 milliGRAM(s) IV Intermittent every 24 hours  heparin  Infusion 800 Unit(s)/Hr (16 mL/Hr) IV Continuous <Continuous>  heparin  Infusion Syringe 300 Unit(s)/Hr (0.6 mL/Hr) CRRT <Continuous>  insulin lispro (ADMELOG) corrective regimen sliding scale   SubCutaneous every 4 hours  meropenem  IVPB 1000 milliGRAM(s) IV Intermittent every 12 hours  metoclopramide Injectable 5 milliGRAM(s) IV Push every 8 hours  milrinone Infusion 0.1 MICROgram(s)/kG/Min (2.79 mL/Hr) IV Continuous <Continuous>  multivitamin/minerals/iron Oral Solution (CENTRUM) 15 milliLiter(s) Oral daily  pantoprazole  Injectable 40 milliGRAM(s) IV Push daily  Phoxillum Filtration BK 4 / 2.5 5000 milliLiter(s) (800 mL/Hr) CRRT <Continuous>  Phoxillum Filtration BK 4 / 2.5 5000 milliLiter(s) (2200 mL/Hr) CRRT <Continuous>  polyethylene glycol 3350 17 Gram(s) Oral two times a day  PrismaSOL Filtration BGK 4 / 2.5 5000 milliLiter(s) (200 mL/Hr) CRRT <Continuous>  senna 2 Tablet(s) Oral at bedtime  sodium chloride 0.9%. 1000 milliLiter(s) (5 mL/Hr) IV Continuous <Continuous>  thiamine 100 milliGRAM(s) Enteral Tube daily  vancomycin  IVPB 1000 milliGRAM(s) IV Intermittent every 24 hours    MEDICATIONS  (PRN):  acetaminophen    Suspension .. 650 milliGRAM(s) Enteral Tube every 6 hours PRN Temp greater or equal to 38C (100.4F), Mild Pain (1 - 3)  lidocaine   4% Patch 1 Patch Transdermal daily PRN pain      Social History: see consult    Family history: see consult    ROS:   ENT: all negative except as noted in HPI   Pulm: denies SOB, cough, hemoptysis  Neuro: denies numbness/tingling, loss of sensation  Endo: denies heat/cold intolerance, excessive sweating      Vital Signs Last 24 Hrs  T(C): 37.6 (14 Dec 2022 16:00), Max: 37.8 (14 Dec 2022 12:00)  T(F): 99.7 (14 Dec 2022 16:00), Max: 100 (14 Dec 2022 12:00)  HR: 96 (14 Dec 2022 17:18) (96 - 118)  BP: --  BP(mean): --  RR: 29 (14 Dec 2022 16:00) (12 - 29)  SpO2: 99% (14 Dec 2022 17:18) (96% - 100%)    Parameters below as of 14 Dec 2022 17:18  Patient On (Oxygen Delivery Method): ventilator                              9.2    15.40 )-----------( 119      ( 14 Dec 2022 00:34 )             29.3    12-14    135  |  102  |  22  ----------------------------<  157<H>  3.8   |  22  |  1.43<H>    Ca    8.0<L>      14 Dec 2022 00:34  Phos  3.7     12-14  Mg     2.4     12-14    TPro  6.3  /  Alb  2.9<L>  /  TBili  1.0  /  DBili  x   /  AST  33  /  ALT  14  /  AlkPhos  118  12-14   PT/INR - ( 14 Dec 2022 00:34 )   PT: 12.5 sec;   INR: 1.08 ratio         PTT - ( 14 Dec 2022 05:30 )  PTT:61.0 sec    PHYSICAL EXAM:  Gen: intubated, awake and alert  Skin: No rashes, bruises, or lesions  Head: Normocephalic, Atraumatic  Face: no edema, erythema, or fluctuance. Parotid glands soft without mass  Eyes: no scleral injection  Nose: +NGT in right nare. Nares bilaterally patent, no discharge  Mouth: No Stridor / Drooling / Trismus.  Mucosa moist, tongue/uvula midline, oropharynx clear  Neck: Flat, supple, no lymphadenopathy, trachea midline, no masses  Lymphatic: No lymphadenopathy  Resp: on vent  Neuro: facial nerve intact, no facial droop

## 2022-12-14 NOTE — PROGRESS NOTE ADULT - PROBLEM SELECTOR PLAN 1
- troponin peaked at > 34459   - [facility] 12/06 with IABP placement revealed that 3 grafts are closed w/ distal native disease from remaining LAD graft  - continue statin  - would start ASA when okay with surgical team. 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 - troponin peaked at > 97644   - [facility] 12/06 with IABP placement revealed that 3 grafts are closed w/ distal native disease from remaining LAD graft  - continue statin  - would start ASA when okay with surgical team.

## 2022-12-14 NOTE — PROGRESS NOTE ADULT - SUBJECTIVE AND OBJECTIVE BOX
infectious diseases progress note:    Patient is a 62y old  Male who presents with a chief complaint of Acute cholecystitis, gallstone pancreatitis (14 Dec 2022 08:16)        Acute pancreatitis without infection or necrosis               Allergies    No Known Allergies    Intolerances        ANTIBIOTICS/RELEVANT:  antimicrobials  fluconAZOLE IVPB      fluconAZOLE IVPB 200 milliGRAM(s) IV Intermittent every 24 hours  meropenem  IVPB 1000 milliGRAM(s) IV Intermittent every 12 hours  vancomycin  IVPB 1000 milliGRAM(s) IV Intermittent every 24 hours    immunologic:    OTHER:  acetaminophen    Suspension .. 650 milliGRAM(s) Enteral Tube every 6 hours PRN  aMIOdarone    Tablet   Oral   aMIOdarone    Tablet 400 milliGRAM(s) Oral every 8 hours  atorvastatin 80 milliGRAM(s) Oral at bedtime  chlorhexidine 0.12% Liquid 15 milliLiter(s) Oral Mucosa every 12 hours  chlorhexidine 2% Cloths 1 Application(s) Topical <User Schedule>  CRRT Treatment    <Continuous>  dexMEDEtomidine Infusion 0.3 MICROgram(s)/kG/Hr IV Continuous <Continuous>  heparin  Infusion 800 Unit(s)/Hr IV Continuous <Continuous>  insulin lispro (ADMELOG) corrective regimen sliding scale   SubCutaneous every 4 hours  lidocaine   4% Patch 1 Patch Transdermal daily PRN  metoclopramide Injectable 5 milliGRAM(s) IV Push every 8 hours  milrinone Infusion 0.1 MICROgram(s)/kG/Min IV Continuous <Continuous>  multivitamin/minerals/iron Oral Solution (CENTRUM) 15 milliLiter(s) Oral daily  pantoprazole  Injectable 40 milliGRAM(s) IV Push daily  Phoxillum Filtration BK 4 / 2.5 5000 milliLiter(s) CRRT <Continuous>  Phoxillum Filtration BK 4 / 2.5 5000 milliLiter(s) CRRT <Continuous>  polyethylene glycol 3350 17 Gram(s) Oral two times a day  PrismaSOL Filtration BGK 4 / 2.5 5000 milliLiter(s) CRRT <Continuous>  senna 2 Tablet(s) Oral at bedtime  sodium chloride 0.9%. 1000 milliLiter(s) IV Continuous <Continuous>  thiamine 100 milliGRAM(s) Enteral Tube daily      Objective:  Vital Signs Last 24 Hrs  T(C): 37.2 (14 Dec 2022 08:00), Max: 37.2 (14 Dec 2022 00:00)  T(F): 99 (14 Dec 2022 08:00), Max: 99 (14 Dec 2022 00:00)  HR: 100 (14 Dec 2022 08:00) (97 - 118)  BP: --  BP(mean): --  RR: 15 (14 Dec 2022 08:00) (9 - 27)  SpO2: 100% (14 Dec 2022 08:00) (97% - 100%)    Parameters below as of 14 Dec 2022 04:00  Patient On (Oxygen Delivery Method): ventilator  O2 Flow (L/min): 40         Eyes:INOCENCIO, EOMI  Ear/Nose/Throat: no oral lesion, no sinus tenderness on percussion	  Neck:no JVD, no lymphadenopathy, supple  Respiratory: CTA lore  Cardiovascular: S1S2 RRR, no murmurs  Gastrointestinal:soft, (+) BS, no HSM  Extremities:no e/e/c        LABS:                        9.2    15.40 )-----------( 119      ( 14 Dec 2022 00:34 )             29.3     12-14    135  |  102  |  22  ----------------------------<  157<H>  3.8   |  22  |  1.43<H>    Ca    8.0<L>      14 Dec 2022 00:34  Phos  3.7     12-14  Mg     2.4     12-14    TPro  6.3  /  Alb  2.9<L>  /  TBili  1.0  /  DBili  x   /  AST  33  /  ALT  14  /  AlkPhos  118  12-14    PT/INR - ( 14 Dec 2022 00:34 )   PT: 12.5 sec;   INR: 1.08 ratio         PTT - ( 14 Dec 2022 05:30 )  PTT:61.0 sec        MICROBIOLOGY:    RECENT CULTURES:  12-11 @ 00:26 Catheterized Catheterized                No growth    12-10 @ 15:45 .Blood Blood-Peripheral                No growth to date.    12-10 @ 15:30 .Blood Blood-Peripheral                No growth to date.    12-10 @ 10:49 ET Tube ET Tube       Moderate polymorphonuclear leukocytes per low power field  No Squamous epithelial cells per low power field  Few Yeast like cells per oil power field           Normal Respiratory Christy present    12-09 @ 04:58 ET Tube ET Tube       Numerous polymorphonuclear leukocytes per low power field  Moderate Squamous epithelial cells per low power field  Few Yeast like cells  per oil power field           Normal Respiratory Christy present    12-08 @ 23:45 .Blood Blood-Peripheral                No Growth Final    12-08 @ 23:44 .Blood Blood-Peripheral                No Growth Final          RESPIRATORY CULTURES:              RADIOLOGY & ADDITIONAL STUDIES:        Pager [phone]  After 5 pm/weekends or if no response :[phone] infectious diseases progress note:    Patient is a 62y old  Male who presents with a chief complaint of Acute cholecystitis, gallstone pancreatitis (14 Dec 2022 08:16)        Acute pancreatitis without infection or necrosis               Allergies    No Known Allergies    Intolerances        ANTIBIOTICS/RELEVANT:  antimicrobials  fluconAZOLE IVPB      fluconAZOLE IVPB 200 milliGRAM(s) IV Intermittent every 24 hours  meropenem  IVPB 1000 milliGRAM(s) IV Intermittent every 12 hours  vancomycin  IVPB 1000 milliGRAM(s) IV Intermittent every 24 hours    immunologic:    OTHER:  acetaminophen    Suspension .. 650 milliGRAM(s) Enteral Tube every 6 hours PRN  aMIOdarone    Tablet   Oral   aMIOdarone    Tablet 400 milliGRAM(s) Oral every 8 hours  atorvastatin 80 milliGRAM(s) Oral at bedtime  chlorhexidine 0.12% Liquid 15 milliLiter(s) Oral Mucosa every 12 hours  chlorhexidine 2% Cloths 1 Application(s) Topical <User Schedule>  CRRT Treatment    <Continuous>  dexMEDEtomidine Infusion 0.3 MICROgram(s)/kG/Hr IV Continuous <Continuous>  heparin  Infusion 800 Unit(s)/Hr IV Continuous <Continuous>  insulin lispro (ADMELOG) corrective regimen sliding scale   SubCutaneous every 4 hours  lidocaine   4% Patch 1 Patch Transdermal daily PRN  metoclopramide Injectable 5 milliGRAM(s) IV Push every 8 hours  milrinone Infusion 0.1 MICROgram(s)/kG/Min IV Continuous <Continuous>  multivitamin/minerals/iron Oral Solution (CENTRUM) 15 milliLiter(s) Oral daily  pantoprazole  Injectable 40 milliGRAM(s) IV Push daily  Phoxillum Filtration BK 4 / 2.5 5000 milliLiter(s) CRRT <Continuous>  Phoxillum Filtration BK 4 / 2.5 5000 milliLiter(s) CRRT <Continuous>  polyethylene glycol 3350 17 Gram(s) Oral two times a day  PrismaSOL Filtration BGK 4 / 2.5 5000 milliLiter(s) CRRT <Continuous>  senna 2 Tablet(s) Oral at bedtime  sodium chloride 0.9%. 1000 milliLiter(s) IV Continuous <Continuous>  thiamine 100 milliGRAM(s) Enteral Tube daily      Objective:  Vital Signs Last 24 Hrs  T(C): 37.2 (14 Dec 2022 08:00), Max: 37.2 (14 Dec 2022 00:00)  T(F): 99 (14 Dec 2022 08:00), Max: 99 (14 Dec 2022 00:00)  HR: 100 (14 Dec 2022 08:00) (97 - 118)  BP: --  BP(mean): --  RR: 15 (14 Dec 2022 08:00) (9 - 27)  SpO2: 100% (14 Dec 2022 08:00) (97% - 100%)    Parameters below as of 14 Dec 2022 04:00  Patient On (Oxygen Delivery Method): ventilator  O2 Flow (L/min): 40         Eyes:INOCENCIO, EOMI  Ear/Nose/Throat: no oral lesion, no sinus tenderness on percussion	  Neck:no JVD, no lymphadenopathy, supple  Respiratory: CTA lore  Cardiovascular: S1S2 RRR, no murmurs  Gastrointestinal:soft, (+) BS, no HSM  Extremities:no e/e/c        LABS:                        9.2    15.40 )-----------( 119      ( 14 Dec 2022 00:34 )             29.3     12-14    135  |  102  |  22  ----------------------------<  157<H>  3.8   |  22  |  1.43<H>    Ca    8.0<L>      14 Dec 2022 00:34  Phos  3.7     12-14  Mg     2.4     12-14    TPro  6.3  /  Alb  2.9<L>  /  TBili  1.0  /  DBili  x   /  AST  33  /  ALT  14  /  AlkPhos  118  12-14    PT/INR - ( 14 Dec 2022 00:34 )   PT: 12.5 sec;   INR: 1.08 ratio         PTT - ( 14 Dec 2022 05:30 )  PTT:61.0 sec        MICROBIOLOGY:    RECENT CULTURES:  12-11 @ 00:26 Catheterized Catheterized                No growth    12-10 @ 15:45 .Blood Blood-Peripheral                No growth to date.    12-10 @ 15:30 .Blood Blood-Peripheral                No growth to date.    12-10 @ 10:49 ET Tube ET Tube       Moderate polymorphonuclear leukocytes per low power field  No Squamous epithelial cells per low power field  Few Yeast like cells per oil power field           Normal Respiratory Christy present    12-09 @ 04:58 ET Tube ET Tube       Numerous polymorphonuclear leukocytes per low power field  Moderate Squamous epithelial cells per low power field  Few Yeast like cells  per oil power field           Normal Respiratory Christy present    12-08 @ 23:45 .Blood Blood-Peripheral                No Growth Final    12-08 @ 23:44 .Blood Blood-Peripheral                No Growth Final          RESPIRATORY CULTURES:              RADIOLOGY & ADDITIONAL STUDIES:        Pager [phone]  After 5 pm/weekends or if no response :[phone] infectious diseases progress note:    Patient is a 62y old  Male who presents with a chief complaint of Acute cholecystitis, gallstone pancreatitis (14 Dec 2022 08:16)        Acute pancreatitis without infection or necrosis               Allergies    No Known Allergies    Intolerances        ANTIBIOTICS/RELEVANT:  antimicrobials  fluconAZOLE IVPB      fluconAZOLE IVPB 200 milliGRAM(s) IV Intermittent every 24 hours  meropenem  IVPB 1000 milliGRAM(s) IV Intermittent every 12 hours  vancomycin  IVPB 1000 milliGRAM(s) IV Intermittent every 24 hours    immunologic:    OTHER:  acetaminophen    Suspension .. 650 milliGRAM(s) Enteral Tube every 6 hours PRN  aMIOdarone    Tablet   Oral   aMIOdarone    Tablet 400 milliGRAM(s) Oral every 8 hours  atorvastatin 80 milliGRAM(s) Oral at bedtime  chlorhexidine 0.12% Liquid 15 milliLiter(s) Oral Mucosa every 12 hours  chlorhexidine 2% Cloths 1 Application(s) Topical <User Schedule>  CRRT Treatment    <Continuous>  dexMEDEtomidine Infusion 0.3 MICROgram(s)/kG/Hr IV Continuous <Continuous>  heparin  Infusion 800 Unit(s)/Hr IV Continuous <Continuous>  insulin lispro (ADMELOG) corrective regimen sliding scale   SubCutaneous every 4 hours  lidocaine   4% Patch 1 Patch Transdermal daily PRN  metoclopramide Injectable 5 milliGRAM(s) IV Push every 8 hours  milrinone Infusion 0.1 MICROgram(s)/kG/Min IV Continuous <Continuous>  multivitamin/minerals/iron Oral Solution (CENTRUM) 15 milliLiter(s) Oral daily  pantoprazole  Injectable 40 milliGRAM(s) IV Push daily  Phoxillum Filtration BK 4 / 2.5 5000 milliLiter(s) CRRT <Continuous>  Phoxillum Filtration BK 4 / 2.5 5000 milliLiter(s) CRRT <Continuous>  polyethylene glycol 3350 17 Gram(s) Oral two times a day  PrismaSOL Filtration BGK 4 / 2.5 5000 milliLiter(s) CRRT <Continuous>  senna 2 Tablet(s) Oral at bedtime  sodium chloride 0.9%. 1000 milliLiter(s) IV Continuous <Continuous>  thiamine 100 milliGRAM(s) Enteral Tube daily      Objective:  Vital Signs Last 24 Hrs  T(C): 37.2 (14 Dec 2022 08:00), Max: 37.2 (14 Dec 2022 00:00)  T(F): 99 (14 Dec 2022 08:00), Max: 99 (14 Dec 2022 00:00)  HR: 100 (14 Dec 2022 08:00) (97 - 118)  BP: --  BP(mean): --  RR: 15 (14 Dec 2022 08:00) (9 - 27)  SpO2: 100% (14 Dec 2022 08:00) (97% - 100%)    Parameters below as of 14 Dec 2022 04:00  Patient On (Oxygen Delivery Method): ventilator  O2 Flow (L/min): 40         Eyes:INOCENCIO, EOMI  Ear/Nose/Throat: no oral lesion, no sinus tenderness on percussion	  Neck:no JVD, no lymphadenopathy, supple  Respiratory: CTA lore  Cardiovascular: S1S2 RRR, no murmurs  Gastrointestinal:soft, (+) BS, no HSM  Extremities:no e/e/c        LABS:                        9.2    15.40 )-----------( 119      ( 14 Dec 2022 00:34 )             29.3     12-14    135  |  102  |  22  ----------------------------<  157<H>  3.8   |  22  |  1.43<H>    Ca    8.0<L>      14 Dec 2022 00:34  Phos  3.7     12-14  Mg     2.4     12-14    TPro  6.3  /  Alb  2.9<L>  /  TBili  1.0  /  DBili  x   /  AST  33  /  ALT  14  /  AlkPhos  118  12-14    PT/INR - ( 14 Dec 2022 00:34 )   PT: 12.5 sec;   INR: 1.08 ratio         PTT - ( 14 Dec 2022 05:30 )  PTT:61.0 sec        MICROBIOLOGY:    RECENT CULTURES:  12-11 @ 00:26 Catheterized Catheterized                No growth    12-10 @ 15:45 .Blood Blood-Peripheral                No growth to date.    12-10 @ 15:30 .Blood Blood-Peripheral                No growth to date.    12-10 @ 10:49 ET Tube ET Tube       Moderate polymorphonuclear leukocytes per low power field  No Squamous epithelial cells per low power field  Few Yeast like cells per oil power field           Normal Respiratory Christy present    12-09 @ 04:58 ET Tube ET Tube       Numerous polymorphonuclear leukocytes per low power field  Moderate Squamous epithelial cells per low power field  Few Yeast like cells  per oil power field           Normal Respiratory Christy present    12-08 @ 23:45 .Blood Blood-Peripheral                No Growth Final    12-08 @ 23:44 .Blood Blood-Peripheral                No Growth Final          RESPIRATORY CULTURES:              RADIOLOGY & ADDITIONAL STUDIES:        Pager [phone]  After 5 pm/weekends or if no response :[phone]

## 2022-12-14 NOTE — PROGRESS NOTE ADULT - PROBLEM SELECTOR PLAN 1
- Plan for bedside tracheostomy pending team plan  - If considering further anticoagulation use reversible agent  - Hold AC if possible  - ENT will follow  - call with any questions

## 2022-12-14 NOTE — PROGRESS NOTE ADULT - ASSESSMENT
63 y/o male with PMH of CABG, DM, HTN, HLD presenting as transfer from [place] for STEMI. Course c/b gallstone pancreatitis leading to ARDS, shock, cardiac arrest then placed on [facility] ECMO. Pt underwent ECMO decannulation. ENT consulted today for evaluation of a tracheostomy for respiratory failure. Patient is intubated and awake/alert, follows commands. Consent obtained from daughter. 63 y/o male with PMH of CABG, DM, HTN, HLD presenting as transfer from [place] for STEMI. Course c/b gallstone pancreatitis leading to ARDS, shock, cardiac arrest then placed on VA ECMO. Pt underwent ECMO decannulation. ENT consulted today for evaluation of a tracheostomy for respiratory failure. Patient is intubated and awake/alert, follows commands. Consent obtained from daughter. 63 y/o male with PMH of CABG, DM, HTN, HLD presenting as transfer from [place] for STEMI. Course c/b gallstone pancreatitis leading to ARDS, shock, cardiac arrest then placed on VA ECMO. Pt underwent ECMO decannulation. ENT consulted today for evaluation of a tracheostomy for respiratory failure. Patient is intubated and awake/alert, follows commands. Consent obtained from daughter.

## 2022-12-14 NOTE — PROGRESS NOTE ADULT - NS ATTEND AMEND GEN_ALL_CORE FT
ENT consulted for evaluation of a tracheostomy for respiratory failure. Patient is intubated and awake/alert, follows commands. Physical exam show short neck, no abnormal pulsation at sternal notch, cyrtoid cartilage palpable. Consent obtained from daughter.     - Plan for bedside tracheostomy pending team plan  - If considering further anticoagulation use reversible agent  - Hold AC if possible  - ENT will follow

## 2022-12-14 NOTE — PROGRESS NOTE ADULT - SUBJECTIVE AND OBJECTIVE BOX
NEUROLOGY FOLLOW-UP CONSULT NOTE    RFC: Altered mental status    Interval history: No acute neurologic events overnight.    Meds:  MEDICATIONS  (STANDING):  albuterol/ipratropium for Nebulization 3 milliLiter(s) Nebulizer every 6 hours  aMIOdarone    Tablet   Oral   aMIOdarone    Tablet 400 milliGRAM(s) Oral every 8 hours  atorvastatin 80 milliGRAM(s) Oral at bedtime  chlorhexidine 0.12% Liquid 15 milliLiter(s) Oral Mucosa every 12 hours  chlorhexidine 2% Cloths 1 Application(s) Topical <User Schedule>  CRRT Treatment    <Continuous>  dexMEDEtomidine Infusion 0.3 MICROgram(s)/kG/Hr (6.97 mL/Hr) IV Continuous <Continuous>  fluconAZOLE IVPB      fluconAZOLE IVPB 200 milliGRAM(s) IV Intermittent every 24 hours  heparin  Infusion 800 Unit(s)/Hr (16 mL/Hr) IV Continuous <Continuous>  insulin lispro (ADMELOG) corrective regimen sliding scale   SubCutaneous every 4 hours  meropenem  IVPB 1000 milliGRAM(s) IV Intermittent every 12 hours  metoclopramide Injectable 5 milliGRAM(s) IV Push every 8 hours  milrinone Infusion 0.1 MICROgram(s)/kG/Min (2.79 mL/Hr) IV Continuous <Continuous>  multivitamin/minerals/iron Oral Solution (CENTRUM) 15 milliLiter(s) Oral daily  pantoprazole  Injectable 40 milliGRAM(s) IV Push daily  Phoxillum Filtration BK 4 / 2.5 5000 milliLiter(s) (800 mL/Hr) CRRT <Continuous>  Phoxillum Filtration BK 4 / 2.5 5000 milliLiter(s) (2200 mL/Hr) CRRT <Continuous>  polyethylene glycol 3350 17 Gram(s) Oral two times a day  PrismaSOL Filtration BGK 4 / 2.5 5000 milliLiter(s) (200 mL/Hr) CRRT <Continuous>  senna 2 Tablet(s) Oral at bedtime  sodium chloride 0.9%. 1000 milliLiter(s) (5 mL/Hr) IV Continuous <Continuous>  thiamine 100 milliGRAM(s) Enteral Tube daily  vancomycin  IVPB 1000 milliGRAM(s) IV Intermittent every 24 hours    MEDICATIONS  (PRN):  acetaminophen    Suspension .. 650 milliGRAM(s) Enteral Tube every 6 hours PRN Temp greater or equal to 38C (100.4F), Mild Pain (1 - 3)  lidocaine   4% Patch 1 Patch Transdermal daily PRN pain      PMHx/PSHx/FHx/SHx:  Acute pancreatitis without infection or necrosis    Handoff    MEWS Score    Cardiac arrest    Personal history of ECMO    Cardiac arrest    Personal history of ECMO    Abdominal pain    Acute pancreatitis    Non-ST elevation MI (NSTEMI)    Gall stone pancreatitis    Type 2 diabetes mellitus    CAD (coronary artery disease)    HTN (hypertension)    HLD (hyperlipidemia)    Prophylactic measure    Acute respiratory failure with hypoxia    Elevated troponin    Medication management    ERIC (acute kidney injury)    Cardiogenic shock    Nontraumatic subdural hematoma    Hypernatremia    Palliative care encounter    Oral bleeding    Supraventricular tachycardia    Fever    Insertion, cannula, percutaneous, adult, for ECMO    Open removal of extracorporeal membrane oxygenation (ECMO) cannula in patient 6 years of age or older    STEMI TX H    Chest pain    NSTEMI (non-ST elevation myocardial infarction)    Acute renal failure    Moderate dehydration    Acute cholecystitis    [email or web address]        Allergies:  No Known Allergies      ROS: All systems negative except as documented in Interval history    O:  T(C): 37.8 (12-14-22 @ 12:00), Max: 37.8 (12-14-22 @ 12:00)  HR: 105 (12-14-22 @ 12:00) (97 - 118)  BP: --  RR: 21 (12-14-22 @ 12:00) (12 - 27)  SpO2: 99% (12-14-22 @ 12:00) (97% - 100%)    Focused neurologic exam:  MS - AOX2, intubated on ventilator, follows commands, communicate through writing, and back to baseline mental status ( according to daughter)  CN - PERRLA, EOMI, VFF, face sens/str/hearing WNL b/l, tongue/palate midline, trap 5/5 b/l  Motor - Normal bulk/tone, 3+/5 all  Sens - LT/temp/vib intact all  DTR's - 1+ all, mute patella reflex and neutral b/l plantar response  Coord - not able to assess due to weakness of arms  Gait and station - not assess due to pt on balloon pump and CVVHD    Pertinent labs/studies:    LABS:  cret                        9.2    15.40 )-----------( 119      ( 14 Dec 2022 00:34 )             29.3     12-14    135  |  102  |  22  ----------------------------<  157<H>  3.8   |  22  |  1.43<H>    Ca    8.0<L>      14 Dec 2022 00:34  Phos  3.7     12-14  Mg     2.4     12-14    TPro  6.3  /  Alb  2.9<L>  /  TBili  1.0  /  DBili  x   /  AST  33  /  ALT  14  /  AlkPhos  118  12-14    PT/INR - ( 14 Dec 2022 00:34 )   PT: 12.5 sec;   INR: 1.08 ratio         PTT - ( 14 Dec 2022 05:30 )  PTT:61.0 sec    Radiology:  CT Head No Cont (12.12.22 @ 01:09)   IMPRESSION:    1.  Head stroke code:  No intracranial hemorrhage is appreciated.  No   intracranial mass lesion is appreciated. Ischemic white matter disease   and atrophy typical for age. Mastoid effusions    2.  Right carotid system:    Atherosclerotic plaque without    hemodynamically significant stenosis.    3.   Left carotid system:     Atherosclerotic plaque without    hemodynamically significant stenosis.    4.   Vertebral circulation:    Patent.    5.  Anterior intracranial circulation:     Intracranial atherosclerosis   cavernous and clinoid segments internal carotid arteries,   mild-to-moderate on the right, moderate on the left.    6.  Posterior intracranial circulation:    Unremarkable.    7.  No large vessel occlusion    8.  Brain perfusion:   No acute infarction of the brain is convincingly   demonstrated. Low-grade ischemia at the low/watershed zones of the left   cerebral hemisphere    9. Nasogastric catheter looped in the oropharynx       CT Head No Cont (12.13.22 @ 09:51)   IMPRESSION:    Similar minimal increased density along the right aspect of the posterior   falx and right tentorial leaf, consistent with minimal residual subdural   hemorrhage.    No parenchymal hemorrhage or brain edema.      EEG  12/13/22 EEG Classification / Summary:  Abnormal  EEG in the awake / drowsy / asleep state(s).  Background slowing, generalized, mild     Clinical Impression:  Mild diffuse / multifocal cerebral dysfunction.   There were no epileptiform abnormalities recorded.

## 2022-12-14 NOTE — PROGRESS NOTE ADULT - ATTENDING COMMENTS
ERIC ATN  Cardiogenic shock  Continue CRRT  Continue phox 4K  decrease UF   Dw CTS    Melissa Mercer MD  Off: [phone]  contact me on teams    (After 5 pm or on weekends please page the on-call fellow/attending, can check [email or web address] for schedule. Login is carmen cantor, schedule under [facility] medicine, psych, derm) ERIC ATN  Cardiogenic shock  Continue CRRT  Continue phox 4K  decrease UF   Dw CTS    Melissa Mercer MD  Off: [phone]  contact me on teams    (After 5 pm or on weekends please page the on-call fellow/attending, can check [email or web address] for schedule. Login is carmen cantor, schedule under [facility] medicine, psych, derm) ERIC ATN  Cardiogenic shock  Continue CRRT  Continue phox 4K  decrease UF   Dw CTS    Melissa Mercer MD  Off: [phone]  contact me on teams    (After 5 pm or on weekends please page the on-call fellow/attending, can check [email or web address] for schedule. Login is carmen cantor, schedule under [facility] medicine, psych, derm)

## 2022-12-14 NOTE — PROGRESS NOTE ADULT - SUBJECTIVE AND OBJECTIVE BOX
CARDIOLOGY CONSULT PROGRESS NOTE  EDUARDO REAL  [id number]    INTERVAL EVENTS:  - tele:   -     ROS:  Negative, except as above.    MEDICATIONS  (STANDING):  aMIOdarone    Tablet   Oral   aMIOdarone    Tablet 400 milliGRAM(s) Oral every 8 hours  atorvastatin 80 milliGRAM(s) Oral at bedtime  chlorhexidine 0.12% Liquid 15 milliLiter(s) Oral Mucosa every 12 hours  chlorhexidine 2% Cloths 1 Application(s) Topical <User Schedule>  CRRT Treatment    <Continuous>  dexMEDEtomidine Infusion 0.3 MICROgram(s)/kG/Hr (6.97 mL/Hr) IV Continuous <Continuous>  fluconAZOLE IVPB      fluconAZOLE IVPB 200 milliGRAM(s) IV Intermittent every 24 hours  heparin  Infusion 800 Unit(s)/Hr (16 mL/Hr) IV Continuous <Continuous>  insulin lispro (ADMELOG) corrective regimen sliding scale   SubCutaneous every 4 hours  meropenem  IVPB 1000 milliGRAM(s) IV Intermittent every 12 hours  metoclopramide Injectable 5 milliGRAM(s) IV Push every 8 hours  milrinone Infusion 0.1 MICROgram(s)/kG/Min (2.79 mL/Hr) IV Continuous <Continuous>  multivitamin/minerals/iron Oral Solution (CENTRUM) 15 milliLiter(s) Oral daily  pantoprazole  Injectable 40 milliGRAM(s) IV Push daily  Phoxillum Filtration BK 4 / 2.5 5000 milliLiter(s) (800 mL/Hr) CRRT <Continuous>  Phoxillum Filtration BK 4 / 2.5 5000 milliLiter(s) (2200 mL/Hr) CRRT <Continuous>  polyethylene glycol 3350 17 Gram(s) Oral two times a day  PrismaSOL Filtration BGK 4 / 2.5 5000 milliLiter(s) (200 mL/Hr) CRRT <Continuous>  senna 2 Tablet(s) Oral at bedtime  sodium chloride 0.9%. 1000 milliLiter(s) (5 mL/Hr) IV Continuous <Continuous>  thiamine 100 milliGRAM(s) Enteral Tube daily  vancomycin  IVPB 1000 milliGRAM(s) IV Intermittent every 24 hours    MEDICATIONS  (PRN):  acetaminophen    Suspension .. 650 milliGRAM(s) Enteral Tube every 6 hours PRN Temp greater or equal to 38C (100.4F), Mild Pain (1 - 3)  lidocaine   4% Patch 1 Patch Transdermal daily PRN pain    Allergies  No Known Allergies    P/E:  Adult Advanced Hemodynamics Last 24 Hrs  CVP(mm Hg): 6 (14 Dec 2022 07:00) (6 - 40)    Vital Signs Last 24 Hrs  T(C): 37.2 (14 Dec 2022 08:00), Max: 37.2 (14 Dec 2022 00:00)  T(F): 99 (14 Dec 2022 08:00), Max: 99 (14 Dec 2022 00:00)  HR: 97 (14 Dec 2022 07:00) (97 - 118)  RR: 14 (14 Dec 2022 07:00) (9 - 27)  SpO2: 99% (14 Dec 2022 07:00) (97% - 100%)    Patient On (Oxygen Delivery Method): ventilator  O2 Flow (L/min): 40    Daily Weight in k.5 (14 Dec 2022 00:00)    I&O's Detail  13 Dec 2022 07:01  -  14 Dec 2022 07:00  IN:    Dexmedetomidine: 204.3 mL    Enteral Tube Flush: 115 mL    Glucerna: 1100 mL    Heparin: 390 mL    IV PiggyBack: 550 mL    IV PiggyBack: 50 mL    Milrinone: 67.2 mL    sodium chloride 0.9%: 120 mL    Vital1.5: 90 mL  Total IN: 2686.5 mL  OUT:    Indwelling Catheter - Urethral (mL): 150 mL    Other (mL): 4539 mL  Total OUT: 4689 mL  Total NET: -.5 mL    I&O's Summary  13 Dec 2022 07:01  -  14 Dec 2022 07:00  --------------------------------------------------------  IN: 2686.5 mL / OUT: 4689 mL / NET: -2002.5 mL    Gen: intubated, laying in hospital bed, rousable and follows commands  HEENT: NCAT, ETT in place, MMM  CV: tachycardic; systolic murmur, S1/S2  Resp: mechanical BS  Abd: soft, NT, hypoactive BS  Ext:  WWP, PPP, IABP L groin,+MESERET    RELEVANT RECENT LABS/IMAGING/STUDIES:                        9.2    15.40 )-----------( 119      ( 14 Dec 2022 00:34 )             29.3     -    135  |  102  |  22  ----------------------------<  157<H>  3.8   |  22  |  1.43<H>    Ca    8.0<L>      14 Dec 2022 00:34  Phos  3.7       Mg     2.4         TPro  6.3  /  Alb  2.9<L>  /  TBili  1.0  /  DBili  x   /  AST  33  /  ALT  14  /  AlkPhos  118  12-    LIVER FUNCTIONS - ( 14 Dec 2022 00:34 )  Alb: 2.9 g/dL / Pro: 6.3 g/dL / ALK PHOS: 118 U/L / ALT: 14 U/L / AST: 33 U/L / GGT: x           PT/INR - ( 14 Dec 2022 00:34 )   PT: 12.5 sec;   INR: 1.08 ratio      PTT - ( 14 Dec 2022 05:30 )  PTT:61.0 sec    ABG - ( 14 Dec 2022 06:00 )  pH, Arterial: 7.42  pH, Blood: x     /  pCO2: 33    /  pO2: 92    / HCO3: 21    / Base Excess: -2.6  /  SaO2: 97.8   CARDIOLOGY CONSULT PROGRESS NOTE  EDUARDO REAL  [id number]    INTERVAL EVENTS:  - tele:   -     ROS:  Negative, except as above.    MEDICATIONS  (STANDING):  aMIOdarone    Tablet   Oral   aMIOdarone    Tablet 400 milliGRAM(s) Oral every 8 hours  atorvastatin 80 milliGRAM(s) Oral at bedtime  chlorhexidine 0.12% Liquid 15 milliLiter(s) Oral Mucosa every 12 hours  chlorhexidine 2% Cloths 1 Application(s) Topical <User Schedule>  CRRT Treatment    <Continuous>  dexMEDEtomidine Infusion 0.3 MICROgram(s)/kG/Hr (6.97 mL/Hr) IV Continuous <Continuous>  fluconAZOLE IVPB      fluconAZOLE IVPB 200 milliGRAM(s) IV Intermittent every 24 hours  heparin  Infusion 800 Unit(s)/Hr (16 mL/Hr) IV Continuous <Continuous>  insulin lispro (ADMELOG) corrective regimen sliding scale   SubCutaneous every 4 hours  meropenem  IVPB 1000 milliGRAM(s) IV Intermittent every 12 hours  metoclopramide Injectable 5 milliGRAM(s) IV Push every 8 hours  milrinone Infusion 0.1 MICROgram(s)/kG/Min (2.79 mL/Hr) IV Continuous <Continuous>  multivitamin/minerals/iron Oral Solution (CENTRUM) 15 milliLiter(s) Oral daily  pantoprazole  Injectable 40 milliGRAM(s) IV Push daily  Phoxillum Filtration BK 4 / 2.5 5000 milliLiter(s) (800 mL/Hr) CRRT <Continuous>  Phoxillum Filtration BK 4 / 2.5 5000 milliLiter(s) (2200 mL/Hr) CRRT <Continuous>  polyethylene glycol 3350 17 Gram(s) Oral two times a day  PrismaSOL Filtration BGK 4 / 2.5 5000 milliLiter(s) (200 mL/Hr) CRRT <Continuous>  senna 2 Tablet(s) Oral at bedtime  sodium chloride 0.9%. 1000 milliLiter(s) (5 mL/Hr) IV Continuous <Continuous>  thiamine 100 milliGRAM(s) Enteral Tube daily  vancomycin  IVPB 1000 milliGRAM(s) IV Intermittent every 24 hours    MEDICATIONS  (PRN):  acetaminophen    Suspension .. 650 milliGRAM(s) Enteral Tube every 6 hours PRN Temp greater or equal to 38C (100.4F), Mild Pain (1 - 3)  lidocaine   4% Patch 1 Patch Transdermal daily PRN pain    Allergies  No Known Allergies    P/E:  Adult Advanced Hemodynamics Last 24 Hrs  CVP(mm Hg): 6 (14 Dec 2022 07:00) (6 - 40)    Vital Signs Last 24 Hrs  T(C): 37.2 (14 Dec 2022 08:00), Max: 37.2 (14 Dec 2022 00:00)  T(F): 99 (14 Dec 2022 08:00), Max: 99 (14 Dec 2022 00:00)  HR: 97 (14 Dec 2022 07:00) (97 - 118)  RR: 14 (14 Dec 2022 07:00) (9 - 27)  SpO2: 99% (14 Dec 2022 07:00) (97% - 100%)    Patient On (Oxygen Delivery Method): ventilator  O2 Flow (L/min): 40    Daily Weight in k.5 (14 Dec 2022 00:00)    I&O's Detail  13 Dec 2022 07:01  -  14 Dec 2022 07:00  IN:    Dexmedetomidine: 204.3 mL    Enteral Tube Flush: 115 mL    Glucerna: 1100 mL    Heparin: 390 mL    IV PiggyBack: 550 mL    IV PiggyBack: 50 mL    Milrinone: 67.2 mL    sodium chloride 0.9%: 120 mL    Vital1.5: 90 mL  Total IN: 2686.5 mL  OUT:    Indwelling Catheter - Urethral (mL): 150 mL    Other (mL): 4539 mL  Total OUT: 4689 mL  Total NET: -.5 mL    I&O's Summary  13 Dec 2022 07:01  -  14 Dec 2022 07:00  --------------------------------------------------------  IN: 2686.5 mL / OUT: 4689 mL / NET: -2002.5 mL    Gen: intubated, laying in hospital bed, rousable and follows commands  HEENT: NCAT, ETT in place, MMM  CV: tachycardic; systolic murmur, S1/S2  Resp: mechanical BS  Abd: soft, NT, hypoactive BS  Ext:  WWP, PPP, IABP L groin,+MESERET    RELEVANT RECENT LABS/IMAGING/STUDIES:                        9.2    15.40 )-----------( 119      ( 14 Dec 2022 00:34 )             29.3     -    135  |  102  |  22  ----------------------------<  157<H>  3.8   |  22  |  1.43<H>    Ca    8.0<L>      14 Dec 2022 00:34  Phos  3.7       Mg     2.4         TPro  6.3  /  Alb  2.9<L>  /  TBili  1.0  /  DBili  x   /  AST  33  /  ALT  14  /  AlkPhos  118  12-    LIVER FUNCTIONS - ( 14 Dec 2022 00:34 )  Alb: 2.9 g/dL / Pro: 6.3 g/dL / ALK PHOS: 118 U/L / ALT: 14 U/L / AST: 33 U/L / GGT: x           PT/INR - ( 14 Dec 2022 00:34 )   PT: 12.5 sec;   INR: 1.08 ratio      PTT - ( 14 Dec 2022 05:30 )  PTT:61.0 sec    ABG - ( 14 Dec 2022 06:00 )  pH, Arterial: 7.42  pH, Blood: x     /  pCO2: 33    /  pO2: 92    / HCO3: 21    / Base Excess: -2.6  /  SaO2: 97.8   CARDIOLOGY CONSULT PROGRESS NOTE  EDUARDO REAL  [id number]    INTERVAL EVENTS:  - tele:   -     ROS:  Negative, except as above.    MEDICATIONS  (STANDING):  aMIOdarone    Tablet   Oral   aMIOdarone    Tablet 400 milliGRAM(s) Oral every 8 hours  atorvastatin 80 milliGRAM(s) Oral at bedtime  chlorhexidine 0.12% Liquid 15 milliLiter(s) Oral Mucosa every 12 hours  chlorhexidine 2% Cloths 1 Application(s) Topical <User Schedule>  CRRT Treatment    <Continuous>  dexMEDEtomidine Infusion 0.3 MICROgram(s)/kG/Hr (6.97 mL/Hr) IV Continuous <Continuous>  fluconAZOLE IVPB      fluconAZOLE IVPB 200 milliGRAM(s) IV Intermittent every 24 hours  heparin  Infusion 800 Unit(s)/Hr (16 mL/Hr) IV Continuous <Continuous>  insulin lispro (ADMELOG) corrective regimen sliding scale   SubCutaneous every 4 hours  meropenem  IVPB 1000 milliGRAM(s) IV Intermittent every 12 hours  metoclopramide Injectable 5 milliGRAM(s) IV Push every 8 hours  milrinone Infusion 0.1 MICROgram(s)/kG/Min (2.79 mL/Hr) IV Continuous <Continuous>  multivitamin/minerals/iron Oral Solution (CENTRUM) 15 milliLiter(s) Oral daily  pantoprazole  Injectable 40 milliGRAM(s) IV Push daily  Phoxillum Filtration BK 4 / 2.5 5000 milliLiter(s) (800 mL/Hr) CRRT <Continuous>  Phoxillum Filtration BK 4 / 2.5 5000 milliLiter(s) (2200 mL/Hr) CRRT <Continuous>  polyethylene glycol 3350 17 Gram(s) Oral two times a day  PrismaSOL Filtration BGK 4 / 2.5 5000 milliLiter(s) (200 mL/Hr) CRRT <Continuous>  senna 2 Tablet(s) Oral at bedtime  sodium chloride 0.9%. 1000 milliLiter(s) (5 mL/Hr) IV Continuous <Continuous>  thiamine 100 milliGRAM(s) Enteral Tube daily  vancomycin  IVPB 1000 milliGRAM(s) IV Intermittent every 24 hours    MEDICATIONS  (PRN):  acetaminophen    Suspension .. 650 milliGRAM(s) Enteral Tube every 6 hours PRN Temp greater or equal to 38C (100.4F), Mild Pain (1 - 3)  lidocaine   4% Patch 1 Patch Transdermal daily PRN pain    Allergies  No Known Allergies    P/E:  Adult Advanced Hemodynamics Last 24 Hrs  CVP(mm Hg): 6 (14 Dec 2022 07:00) (6 - 40)    Vital Signs Last 24 Hrs  T(C): 37.2 (14 Dec 2022 08:00), Max: 37.2 (14 Dec 2022 00:00)  T(F): 99 (14 Dec 2022 08:00), Max: 99 (14 Dec 2022 00:00)  HR: 97 (14 Dec 2022 07:00) (97 - 118)  RR: 14 (14 Dec 2022 07:00) (9 - 27)  SpO2: 99% (14 Dec 2022 07:00) (97% - 100%)    Patient On (Oxygen Delivery Method): ventilator  O2 Flow (L/min): 40    Daily Weight in k.5 (14 Dec 2022 00:00)    I&O's Detail  13 Dec 2022 07:01  -  14 Dec 2022 07:00  IN:    Dexmedetomidine: 204.3 mL    Enteral Tube Flush: 115 mL    Glucerna: 1100 mL    Heparin: 390 mL    IV PiggyBack: 550 mL    IV PiggyBack: 50 mL    Milrinone: 67.2 mL    sodium chloride 0.9%: 120 mL    Vital1.5: 90 mL  Total IN: 2686.5 mL  OUT:    Indwelling Catheter - Urethral (mL): 150 mL    Other (mL): 4539 mL  Total OUT: 4689 mL  Total NET: -.5 mL    I&O's Summary  13 Dec 2022 07:01  -  14 Dec 2022 07:00  --------------------------------------------------------  IN: 2686.5 mL / OUT: 4689 mL / NET: -2002.5 mL    Gen: intubated, laying in hospital bed, rousable and follows commands  HEENT: NCAT, ETT in place, MMM  CV: tachycardic; systolic murmur, S1/S2  Resp: mechanical BS  Abd: soft, NT, hypoactive BS  Ext:  WWP, PPP, IABP L groin,+MESERET    RELEVANT RECENT LABS/IMAGING/STUDIES:                        9.2    15.40 )-----------( 119      ( 14 Dec 2022 00:34 )             29.3     -    135  |  102  |  22  ----------------------------<  157<H>  3.8   |  22  |  1.43<H>    Ca    8.0<L>      14 Dec 2022 00:34  Phos  3.7       Mg     2.4         TPro  6.3  /  Alb  2.9<L>  /  TBili  1.0  /  DBili  x   /  AST  33  /  ALT  14  /  AlkPhos  118  12-    LIVER FUNCTIONS - ( 14 Dec 2022 00:34 )  Alb: 2.9 g/dL / Pro: 6.3 g/dL / ALK PHOS: 118 U/L / ALT: 14 U/L / AST: 33 U/L / GGT: x           PT/INR - ( 14 Dec 2022 00:34 )   PT: 12.5 sec;   INR: 1.08 ratio      PTT - ( 14 Dec 2022 05:30 )  PTT:61.0 sec    ABG - ( 14 Dec 2022 06:00 )  pH, Arterial: 7.42  pH, Blood: x     /  pCO2: 33    /  pO2: 92    / HCO3: 21    / Base Excess: -2.6  /  SaO2: 97.8   CARDIOLOGY CONSULT PROGRESS NOTE  EDUARDO REAL  [id number]    INTERVAL EVENTS:  - tele: NSR 90-110s, no events  - intubated/sedated    ROS:  Negative, except as above.    MEDICATIONS  (STANDING):  aMIOdarone    Tablet   Oral   aMIOdarone    Tablet 400 milliGRAM(s) Oral every 8 hours  atorvastatin 80 milliGRAM(s) Oral at bedtime  chlorhexidine 0.12% Liquid 15 milliLiter(s) Oral Mucosa every 12 hours  chlorhexidine 2% Cloths 1 Application(s) Topical <User Schedule>  CRRT Treatment    <Continuous>  dexMEDEtomidine Infusion 0.3 MICROgram(s)/kG/Hr (6.97 mL/Hr) IV Continuous <Continuous>  fluconAZOLE IVPB      fluconAZOLE IVPB 200 milliGRAM(s) IV Intermittent every 24 hours  heparin  Infusion 800 Unit(s)/Hr (16 mL/Hr) IV Continuous <Continuous>  insulin lispro (ADMELOG) corrective regimen sliding scale   SubCutaneous every 4 hours  meropenem  IVPB 1000 milliGRAM(s) IV Intermittent every 12 hours  metoclopramide Injectable 5 milliGRAM(s) IV Push every 8 hours  milrinone Infusion 0.1 MICROgram(s)/kG/Min (2.79 mL/Hr) IV Continuous <Continuous>  multivitamin/minerals/iron Oral Solution (CENTRUM) 15 milliLiter(s) Oral daily  pantoprazole  Injectable 40 milliGRAM(s) IV Push daily  Phoxillum Filtration BK 4 / 2.5 5000 milliLiter(s) (800 mL/Hr) CRRT <Continuous>  Phoxillum Filtration BK 4 / 2.5 5000 milliLiter(s) (2200 mL/Hr) CRRT <Continuous>  polyethylene glycol 3350 17 Gram(s) Oral two times a day  PrismaSOL Filtration BGK 4 / 2.5 5000 milliLiter(s) (200 mL/Hr) CRRT <Continuous>  senna 2 Tablet(s) Oral at bedtime  sodium chloride 0.9%. 1000 milliLiter(s) (5 mL/Hr) IV Continuous <Continuous>  thiamine 100 milliGRAM(s) Enteral Tube daily  vancomycin  IVPB 1000 milliGRAM(s) IV Intermittent every 24 hours    MEDICATIONS  (PRN):  acetaminophen    Suspension .. 650 milliGRAM(s) Enteral Tube every 6 hours PRN Temp greater or equal to 38C (100.4F), Mild Pain (1 - 3)  lidocaine   4% Patch 1 Patch Transdermal daily PRN pain    Allergies  No Known Allergies    P/E:  Adult Advanced Hemodynamics Last 24 Hrs  CVP(mm Hg): 6 (14 Dec 2022 07:00) (6 - 40)    Vital Signs Last 24 Hrs  T(C): 37.2 (14 Dec 2022 08:00), Max: 37.2 (14 Dec 2022 00:00)  T(F): 99 (14 Dec 2022 08:00), Max: 99 (14 Dec 2022 00:00)  HR: 97 (14 Dec 2022 07:00) (97 - 118)  RR: 14 (14 Dec 2022 07:00) (9 - 27)  SpO2: 99% (14 Dec 2022 07:00) (97% - 100%)    Patient On (Oxygen Delivery Method): ventilator  O2 Flow (L/min): 40    Daily Weight in k.5 (14 Dec 2022 00:00)    I&O's Detail  13 Dec 2022 07:01  -  14 Dec 2022 07:00  IN:    Dexmedetomidine: 204.3 mL    Enteral Tube Flush: 115 mL    Glucerna: 1100 mL    Heparin: 390 mL    IV PiggyBack: 550 mL    IV PiggyBack: 50 mL    Milrinone: 67.2 mL    sodium chloride 0.9%: 120 mL    Vital1.5: 90 mL  Total IN: 2686.5 mL  OUT:    Indwelling Catheter - Urethral (mL): 150 mL    Other (mL): 4539 mL  Total OUT: 4689 mL  Total NET: -2002.5 mL    I&O's Summary  13 Dec 2022 07:01  -  14 Dec 2022 07:00  --------------------------------------------------------  IN: 2686.5 mL / OUT: 4689 mL / NET: -2002.5 mL    Gen: intubated, laying in hospital bed, rousable and follows commands  HEENT: NCAT, ETT in place, MMM  CV: tachycardic; systolic murmur, S1/S2  Resp: mechanical BS  Abd: soft, NT, hypoactive BS  Ext:  WWP, PPP, IABP L groin,+MESERET    RELEVANT RECENT LABS/IMAGING/STUDIES:                        9.2    15.40 )-----------( 119      ( 14 Dec 2022 00:34 )             29.3     12-    135  |  102  |  22  ----------------------------<  157<H>  3.8   |  22  |  1.43<H>    Ca    8.0<L>      14 Dec 2022 00:34  Phos  3.7       Mg     2.4         TPro  6.3  /  Alb  2.9<L>  /  TBili  1.0  /  DBili  x   /  AST  33  /  ALT  14  /  AlkPhos  118      LIVER FUNCTIONS - ( 14 Dec 2022 00:34 )  Alb: 2.9 g/dL / Pro: 6.3 g/dL / ALK PHOS: 118 U/L / ALT: 14 U/L / AST: 33 U/L / GGT: x           PT/INR - ( 14 Dec 2022 00:34 )   PT: 12.5 sec;   INR: 1.08 ratio      PTT - ( 14 Dec 2022 05:30 )  PTT:61.0 sec    ABG - ( 14 Dec 2022 06:00 )  pH, Arterial: 7.42  pH, Blood: x     /  pCO2: 33    /  pO2: 92    / HCO3: 21    / Base Excess: -2.6  /  SaO2: 97.8   CARDIOLOGY CONSULT PROGRESS NOTE  EDUARDO REAL  [id number]    INTERVAL EVENTS:  - tele: NSR 90-110s, no events  - intubated/sedated    ROS:  Negative, except as above.    MEDICATIONS  (STANDING):  aMIOdarone    Tablet   Oral   aMIOdarone    Tablet 400 milliGRAM(s) Oral every 8 hours  atorvastatin 80 milliGRAM(s) Oral at bedtime  chlorhexidine 0.12% Liquid 15 milliLiter(s) Oral Mucosa every 12 hours  chlorhexidine 2% Cloths 1 Application(s) Topical <User Schedule>  CRRT Treatment    <Continuous>  dexMEDEtomidine Infusion 0.3 MICROgram(s)/kG/Hr (6.97 mL/Hr) IV Continuous <Continuous>  fluconAZOLE IVPB      fluconAZOLE IVPB 200 milliGRAM(s) IV Intermittent every 24 hours  heparin  Infusion 800 Unit(s)/Hr (16 mL/Hr) IV Continuous <Continuous>  insulin lispro (ADMELOG) corrective regimen sliding scale   SubCutaneous every 4 hours  meropenem  IVPB 1000 milliGRAM(s) IV Intermittent every 12 hours  metoclopramide Injectable 5 milliGRAM(s) IV Push every 8 hours  milrinone Infusion 0.1 MICROgram(s)/kG/Min (2.79 mL/Hr) IV Continuous <Continuous>  multivitamin/minerals/iron Oral Solution (CENTRUM) 15 milliLiter(s) Oral daily  pantoprazole  Injectable 40 milliGRAM(s) IV Push daily  Phoxillum Filtration BK 4 / 2.5 5000 milliLiter(s) (800 mL/Hr) CRRT <Continuous>  Phoxillum Filtration BK 4 / 2.5 5000 milliLiter(s) (2200 mL/Hr) CRRT <Continuous>  polyethylene glycol 3350 17 Gram(s) Oral two times a day  PrismaSOL Filtration BGK 4 / 2.5 5000 milliLiter(s) (200 mL/Hr) CRRT <Continuous>  senna 2 Tablet(s) Oral at bedtime  sodium chloride 0.9%. 1000 milliLiter(s) (5 mL/Hr) IV Continuous <Continuous>  thiamine 100 milliGRAM(s) Enteral Tube daily  vancomycin  IVPB 1000 milliGRAM(s) IV Intermittent every 24 hours    MEDICATIONS  (PRN):  acetaminophen    Suspension .. 650 milliGRAM(s) Enteral Tube every 6 hours PRN Temp greater or equal to 38C (100.4F), Mild Pain (1 - 3)  lidocaine   4% Patch 1 Patch Transdermal daily PRN pain    Allergies  No Known Allergies    P/E:  Adult Advanced Hemodynamics Last 24 Hrs  CVP(mm Hg): 6 (14 Dec 2022 07:00) (6 - 40)    Vital Signs Last 24 Hrs  T(C): 37.2 (14 Dec 2022 08:00), Max: 37.2 (14 Dec 2022 00:00)  T(F): 99 (14 Dec 2022 08:00), Max: 99 (14 Dec 2022 00:00)  HR: 97 (14 Dec 2022 07:00) (97 - 118)  RR: 14 (14 Dec 2022 07:00) (9 - 27)  SpO2: 99% (14 Dec 2022 07:00) (97% - 100%)    Patient On (Oxygen Delivery Method): ventilator  O2 Flow (L/min): 40    Daily Weight in k.5 (14 Dec 2022 00:00)    I&O's Detail  13 Dec 2022 07:01  -  14 Dec 2022 07:00  IN:    Dexmedetomidine: 204.3 mL    Enteral Tube Flush: 115 mL    Glucerna: 1100 mL    Heparin: 390 mL    IV PiggyBack: 550 mL    IV PiggyBack: 50 mL    Milrinone: 67.2 mL    sodium chloride 0.9%: 120 mL    Vital1.5: 90 mL  Total IN: 2686.5 mL  OUT:    Indwelling Catheter - Urethral (mL): 150 mL    Other (mL): 4539 mL  Total OUT: 4689 mL  Total NET: -2002.5 mL    I&O's Summary  13 Dec 2022 07:01  -  14 Dec 2022 07:00  --------------------------------------------------------  IN: 2686.5 mL / OUT: 4689 mL / NET: -2002.5 mL    Gen: intubated, laying in hospital bed, rousable and follows commands  HEENT: NCAT, ETT in place, MMM  CV: tachycardic; systolic murmur, S1/S2  Resp: mechanical BS  Abd: soft, NT, hypoactive BS  Ext:  WWP, PPP, IABP L groin,+MESERET    RELEVANT RECENT LABS/IMAGING/STUDIES:                        9.2    15.40 )-----------( 119      ( 14 Dec 2022 00:34 )             29.3     12-    135  |  102  |  22  ----------------------------<  157<H>  3.8   |  22  |  1.43<H>    Ca    8.0<L>      14 Dec 2022 00:34  Phos  3.7       Mg     2.4         TPro  6.3  /  Alb  2.9<L>  /  TBili  1.0  /  DBili  x   /  AST  33  /  ALT  14  /  AlkPhos  118      LIVER FUNCTIONS - ( 14 Dec 2022 00:34 )  Alb: 2.9 g/dL / Pro: 6.3 g/dL / ALK PHOS: 118 U/L / ALT: 14 U/L / AST: 33 U/L / GGT: x           PT/INR - ( 14 Dec 2022 00:34 )   PT: 12.5 sec;   INR: 1.08 ratio      PTT - ( 14 Dec 2022 05:30 )  PTT:61.0 sec    ABG - ( 14 Dec 2022 06:00 )  pH, Arterial: 7.42  pH, Blood: x     /  pCO2: 33    /  pO2: 92    / HCO3: 21    / Base Excess: -2.6  /  SaO2: 97.8   CARDIOLOGY CONSULT PROGRESS NOTE  EDUARDO REAL  [id number]    INTERVAL EVENTS:  - tele: NSR 90-110s, no events  - intubated/sedated    ROS:  Negative, except as above.    MEDICATIONS  (STANDING):  aMIOdarone    Tablet   Oral   aMIOdarone    Tablet 400 milliGRAM(s) Oral every 8 hours  atorvastatin 80 milliGRAM(s) Oral at bedtime  chlorhexidine 0.12% Liquid 15 milliLiter(s) Oral Mucosa every 12 hours  chlorhexidine 2% Cloths 1 Application(s) Topical <User Schedule>  CRRT Treatment    <Continuous>  dexMEDEtomidine Infusion 0.3 MICROgram(s)/kG/Hr (6.97 mL/Hr) IV Continuous <Continuous>  fluconAZOLE IVPB      fluconAZOLE IVPB 200 milliGRAM(s) IV Intermittent every 24 hours  heparin  Infusion 800 Unit(s)/Hr (16 mL/Hr) IV Continuous <Continuous>  insulin lispro (ADMELOG) corrective regimen sliding scale   SubCutaneous every 4 hours  meropenem  IVPB 1000 milliGRAM(s) IV Intermittent every 12 hours  metoclopramide Injectable 5 milliGRAM(s) IV Push every 8 hours  milrinone Infusion 0.1 MICROgram(s)/kG/Min (2.79 mL/Hr) IV Continuous <Continuous>  multivitamin/minerals/iron Oral Solution (CENTRUM) 15 milliLiter(s) Oral daily  pantoprazole  Injectable 40 milliGRAM(s) IV Push daily  Phoxillum Filtration BK 4 / 2.5 5000 milliLiter(s) (800 mL/Hr) CRRT <Continuous>  Phoxillum Filtration BK 4 / 2.5 5000 milliLiter(s) (2200 mL/Hr) CRRT <Continuous>  polyethylene glycol 3350 17 Gram(s) Oral two times a day  PrismaSOL Filtration BGK 4 / 2.5 5000 milliLiter(s) (200 mL/Hr) CRRT <Continuous>  senna 2 Tablet(s) Oral at bedtime  sodium chloride 0.9%. 1000 milliLiter(s) (5 mL/Hr) IV Continuous <Continuous>  thiamine 100 milliGRAM(s) Enteral Tube daily  vancomycin  IVPB 1000 milliGRAM(s) IV Intermittent every 24 hours    MEDICATIONS  (PRN):  acetaminophen    Suspension .. 650 milliGRAM(s) Enteral Tube every 6 hours PRN Temp greater or equal to 38C (100.4F), Mild Pain (1 - 3)  lidocaine   4% Patch 1 Patch Transdermal daily PRN pain    Allergies  No Known Allergies    P/E:  Adult Advanced Hemodynamics Last 24 Hrs  CVP(mm Hg): 6 (14 Dec 2022 07:00) (6 - 40)    Vital Signs Last 24 Hrs  T(C): 37.2 (14 Dec 2022 08:00), Max: 37.2 (14 Dec 2022 00:00)  T(F): 99 (14 Dec 2022 08:00), Max: 99 (14 Dec 2022 00:00)  HR: 97 (14 Dec 2022 07:00) (97 - 118)  RR: 14 (14 Dec 2022 07:00) (9 - 27)  SpO2: 99% (14 Dec 2022 07:00) (97% - 100%)    Patient On (Oxygen Delivery Method): ventilator  O2 Flow (L/min): 40    Daily Weight in k.5 (14 Dec 2022 00:00)    I&O's Detail  13 Dec 2022 07:01  -  14 Dec 2022 07:00  IN:    Dexmedetomidine: 204.3 mL    Enteral Tube Flush: 115 mL    Glucerna: 1100 mL    Heparin: 390 mL    IV PiggyBack: 550 mL    IV PiggyBack: 50 mL    Milrinone: 67.2 mL    sodium chloride 0.9%: 120 mL    Vital1.5: 90 mL  Total IN: 2686.5 mL  OUT:    Indwelling Catheter - Urethral (mL): 150 mL    Other (mL): 4539 mL  Total OUT: 4689 mL  Total NET: -2002.5 mL    I&O's Summary  13 Dec 2022 07:01  -  14 Dec 2022 07:00  --------------------------------------------------------  IN: 2686.5 mL / OUT: 4689 mL / NET: -2002.5 mL    Gen: intubated, laying in hospital bed, rousable and follows commands  HEENT: NCAT, ETT in place, MMM  CV: tachycardic; systolic murmur, S1/S2  Resp: mechanical BS  Abd: soft, NT, hypoactive BS  Ext:  WWP, PPP, IABP L groin,+MESERET    RELEVANT RECENT LABS/IMAGING/STUDIES:                        9.2    15.40 )-----------( 119      ( 14 Dec 2022 00:34 )             29.3     12-    135  |  102  |  22  ----------------------------<  157<H>  3.8   |  22  |  1.43<H>    Ca    8.0<L>      14 Dec 2022 00:34  Phos  3.7       Mg     2.4         TPro  6.3  /  Alb  2.9<L>  /  TBili  1.0  /  DBili  x   /  AST  33  /  ALT  14  /  [employer]  118      LIVER FUNCTIONS - ( 14 Dec 2022 00:34 )  Alb: 2.9 g/dL / Pro: 6.3 g/dL / ALK PHOS: 118 U/L / ALT: 14 U/L / AST: 33 U/L / GGT: x           PT/INR - ( 14 Dec 2022 00:34 )   PT: 12.5 sec;   INR: 1.08 ratio      PTT - ( 14 Dec 2022 05:30 )  PTT:61.0 sec    ABG - ( 14 Dec 2022 06:00 )  pH, Arterial: 7.42  pH, Blood: x     /  pCO2: 33    /  pO2: 92    / HCO3: 21    / Base Excess: -2.6  /  SaO2: 97.8

## 2022-12-14 NOTE — PROGRESS NOTE ADULT - SUBJECTIVE AND OBJECTIVE BOX
Patient seen and examined at the bedside.    Remained critically ill on continuous ICU monitoring.    OBJECTIVE:  Vital Signs Last 24 Hrs  T(C): 38 (14 Dec 2022 20:00), Max: 38 (14 Dec 2022 20:00)  T(F): 100.4 (14 Dec 2022 20:00), Max: 100.4 (14 Dec 2022 20:00)  HR: 107 (14 Dec 2022 23:00) (96 - 118)  BP: --  BP(mean): --  RR: 18 (14 Dec 2022 23:00) (12 - 35)  SpO2: 94% (14 Dec 2022 23:00) (94% - 100%)    Parameters below as of 14 Dec 2022 20:00  Patient On (Oxygen Delivery Method): ventilator    O2 Concentration (%): 40      Physical Exam:   General: NAD   Neurology: nonfocal   Eyes: bilateral pupils equal and reactive   ENT/Neck: Neck supple, trachea midline, No JVD   Respiratory: Clear bilaterally   CV: S1S2, no murmurs        [x] Sternal dressing, [x] Mediastinal CT, [x] Pleural CT [x] Bulb         [x] Sinus rhythm, [x] Afib, [x] Temporary pacing, [x] PPM  Abdominal: Soft, NT, ND +BS   Extremities: 1-2+ pedal edema noted, + peripheral pulses   Skin: No Rashes, Hematoma, Ecchymosis                           Assessment:        Plan:   ***Neuro***  [x] Hx of CVA   [x] Nonfocal  [x] Sedated with [x] Diprivan [x] Precedex   Post operative neuro assessment     ***Cardiovascular***  Invasive hemodynamic monitoring, assess perfusion indices   SR / CVP ___ / MAP ___ / PAP ___ / CI ___ / Hct ___ / Lactate ___   [x] Amiodarone [x] Cardene [x] Dobutamine [x] Levophed [x] Phenylephrine [x] Vasopressin   [x]  IABP [x]  LVAD [x]  Impella [x] ECMO    Volume:    Reassessment of hemodynamics post resuscitation *or .rh if no fluids above*  ** insert PO MEDS**  Monitor chest tube outputs *remove if none present, check DAILY*  [x] AC therapy with / [x] VTE ppx with   [x]  ASA [x]  Plavix [x] Statin   Serial EKG and cardiac enzymes     ***Pulmonary***  *Make sure to add settings! - Remove if not applicable* [x] NC [x] BiPAP [x] HFO2 [x]  Nitric oxide     OR     *if has vent settings below* Post op vent management   Titration of FiO2 and PEEP, follow SpO2, CXR, blood gasses     Mode: AC/ CMV (Assist Control/ Continuous Mandatory Ventilation)  RR (machine): 14  FiO2: 40  PEEP: 5  ITime: 1  MAP: 11  PC: 12  PIP: 20              ***GI***  [x] Diet:   [x] Protonix  [x]  Pepcid    ***Renal***  [x] ERIC [x]  CKD [x] ESRD on HD   Continue to monitor I/Os, BUN/Creatinine.   Replete lytes PRN  De Dios present *remove if none*    ***ID***  *summarize abx/indication*  *If no abxs* No active antibiotic coverage      ***Endocrine***  [x] Stress Hyperglycemia [x]  DM2 [x] DM1 [x] Prediabetes : HbA1c ____%                - [x] Insulin gtt  [x]  ISS  [x] NPH  [x]  Lantus             - Need tight glycemic control to prevent wound infection.      ALL MEDICATIONS (delete after use)  MEDICATIONS  (STANDING):  albuterol/ipratropium for Nebulization 3 milliLiter(s) Nebulizer every 6 hours  aMIOdarone    Tablet   Oral   atorvastatin 80 milliGRAM(s) Oral at bedtime  chlorhexidine 0.12% Liquid 15 milliLiter(s) Oral Mucosa every 12 hours  chlorhexidine 2% Cloths 1 Application(s) Topical <User Schedule>  CRRT Treatment    <Continuous>  dexMEDEtomidine Infusion 0.3 MICROgram(s)/kG/Hr (6.97 mL/Hr) IV Continuous <Continuous>  fluconAZOLE IVPB      fluconAZOLE IVPB 200 milliGRAM(s) IV Intermittent every 24 hours  heparin  Infusion 800 Unit(s)/Hr (16 mL/Hr) IV Continuous <Continuous>  heparin  Infusion Syringe 300 Unit(s)/Hr (0.6 mL/Hr) CRRT <Continuous>  insulin lispro (ADMELOG) corrective regimen sliding scale   SubCutaneous every 4 hours  meropenem  IVPB 1000 milliGRAM(s) IV Intermittent every 12 hours  metoclopramide Injectable 5 milliGRAM(s) IV Push every 8 hours  milrinone Infusion 0.1 MICROgram(s)/kG/Min (2.79 mL/Hr) IV Continuous <Continuous>  multivitamin/minerals/iron Oral Solution (CENTRUM) 15 milliLiter(s) Oral daily  pantoprazole  Injectable 40 milliGRAM(s) IV Push daily  Phoxillum Filtration BK 4 / 2.5 5000 milliLiter(s) (2200 mL/Hr) CRRT <Continuous>  Phoxillum Filtration BK 4 / 2.5 5000 milliLiter(s) (800 mL/Hr) CRRT <Continuous>  polyethylene glycol 3350 17 Gram(s) Oral two times a day  PrismaSOL Filtration BGK 4 / 2.5 5000 milliLiter(s) (200 mL/Hr) CRRT <Continuous>  senna 2 Tablet(s) Oral at bedtime  sodium chloride 0.9%. 1000 milliLiter(s) (5 mL/Hr) IV Continuous <Continuous>  thiamine 100 milliGRAM(s) Enteral Tube daily  vancomycin  IVPB 1000 milliGRAM(s) IV Intermittent every 24 hours  vasopressin Infusion 0.02 Unit(s)/Min (3 mL/Hr) IV Continuous <Continuous>    MEDICATIONS  (PRN):  acetaminophen    Suspension .. 650 milliGRAM(s) Enteral Tube every 6 hours PRN Temp greater or equal to 38C (100.4F), Mild Pain (1 - 3)  lidocaine   4% Patch 1 Patch Transdermal daily PRN pain      Patient requires continuous monitoring with bedside rhythm monitoring, pulse oximetry monitoring, and continuous central venous and arterial pressure monitoring; and intermittent blood gas analysis. Care plan discussed with the ICU care team.   Patient remained critical, at risk for life threatening decompensation.    I have spent 30 minutes providing critical care management to this patient.    By signing my name below, I, Erin Palafox, attest that this documentation has been prepared under the direction and in the presence of ___  Electronically signed: Jolly Hunt, 12-14-22 @ 23:11    I, ___ , personally performed the services described in this documentation. all medical record entries made by the jolly were at my direction and in my presence. I have reviewed the chart and agree that the record reflects my personal performance and is accurate and complete  Electronically signed: ___ Patient seen and examined at the bedside.    Remained critically ill on continuous ICU monitoring.    OBJECTIVE:  Vital Signs Last 24 Hrs  T(C): 38 (14 Dec 2022 20:00), Max: 38 (14 Dec 2022 20:00)  T(F): 100.4 (14 Dec 2022 20:00), Max: 100.4 (14 Dec 2022 20:00)  HR: 107 (14 Dec 2022 23:00) (96 - 118)  BP: --  BP(mean): --  RR: 18 (14 Dec 2022 23:00) (12 - 35)  SpO2: 94% (14 Dec 2022 23:00) (94% - 100%)    Parameters below as of 14 Dec 2022 20:00  Patient On (Oxygen Delivery Method): ventilator    O2 Concentration (%): 40      Physical Exam:   General: Intubated, multiple lines gtt  Neurology: on/off sedation  Eyes: bilateral pupils equal and reactive   ENT/Neck: +ETT midline, Neck supple, trachea midline, No JVD   Respiratory: rhonchi bilaterally   CV: S1S2, no murmurs        [x] Sinus Tachycardia  Abdominal: Softly distended,+BS   Extremities: 1+ pedal edema noted, + peripheral pulses palpable, warm  Skin: No Rashes, Hematoma, Ecchymosis           Assessment:  61 y/o male with PMH of CABG, DM, HTN, HLD presenting as transfer from [place] for c/f STEMI. PTA arrival received 325 aspirin, 600 plavix, and no heparin drip started. Around 1:30 am patient had multiple episodes of non-bloody diarrhea and emesis with associated abdominal pain and chest pain, unable to localize, non-radiating, with associated SOB and no associated palpitations or diaphoresis. No recent fevers/chills, cough, rashes, or changes in urination. Does report mild diffuse back pain; started 5 days ago in setting on injury while walking and lifting objects. Denies focal weakness, numbness/tingling, or LOC due does report mild dizziness.    acute respiratory distress/failure, intubated ,   cardiogenic shock s/p [facility] ECMO decannulated 12/8  CAD/ASHD/CABG, acute MI  cardiogenic shock  Hemodynamic instability  acute renal failure  encounter management IABP  encounter weaning ventilator  Leukocytosis   Thrombocytopenia  Anemia  acute pancreatitis    Plan:   ***Neuro***  CT head showed 4mm subdural hematoma 11/26: repeat CT head unchanged 12/01,  [x] Sedated with [x] Precedex for vent synchrony  Post operative neuro assessment   Soft palate laceration, ENT scoped 12/8, stable, no acute intervention at this time  Repeat Head CT 12/13 Similar minimal increased density along the right aspect of the posterior   falx and right tentorial leaf, consistent with minimal residual subdural   hemorrhage.    No parenchymal hemorrhage or brain edema.    ***Cardiovascular***  12/8 TTE EF 20-25%, Severe global left ventricular systolic dysfunction.  12/8 GRZEGORZ EF 40-45%, MR, TR, normal RV function  12/7 TTE turndown EF 30-40%, normal RV, MR mod   Invasive hemodynamic monitoring, assess perfusion indices   ST / CVP 15/ MAP 89/ Hct 29.3/ Lactate 0.9  [x] Primacor- 0.1 mcgs/kg/min [x] Vasopressin 0.02 Units  [x] 12/7 cath patent LIMA graft and other grafts occluded, femoral IABP placed, with good augmentation @ 1:1,  [x] 12/8 ECMO d/c'd  PO Amiodarone for afib prophylaxis   Continuos reassessment of hemodynamics   12/8 Aflutter s/p DCCV x2  [x] AC Therapy with Heparin gtt PTT 44  [x] Statin   11/29 HIT NG, 12/8 MARY Neg  Remains on primacor, will continue until after IABP wean    ***Pulmonary***  Post op vent management   Titration of FiO2 and PEEP, follow SpO2, CXR, blood gasses   CPAP trials as tolerated     Mode: AC/ CMV (Assist Control/ Continuous Mandatory Ventilation)  RR (machine): 14  FiO2: 40  PEEP: 5  ITime: 1  MAP: 11  PC: 12  PIP: 20            ***GI***  [x] NPO with TF's Glucerna 1.5 @ goal of 65cc/hr   [x] Protonix    Bowel regimen with Miralax and senna  Reglan for gut motility    ***Renal***  ERIC, renal following CVVHD started 12/5  Continue to monitor I/Os, BUN/Creatinine.   Replete lytes PRN  De Dios present  CRRT Treatment    ***ID***  Empiric coverage with Vancomycin and Meropenem   Diflucan for prophiolaxia   Plan to CT to rule out infection     ***Endocrine***  [x]  DM2: HbA1c 7.3%                - [x] ISS             - Need tight glycemic control to prevent wound infection.      Patient requires continuous monitoring with bedside rhythm monitoring, pulse oximetry monitoring, and continuous central venous and arterial pressure monitoring; and intermittent blood gas analysis. Care plan discussed with the ICU care team.   Patient remained critical, at risk for life threatening decompensation.    I have spent 30 minutes providing critical care management to this patient.    By signing my name below, I, Erin Palafox, attest that this documentation has been prepared under the direction and in the presence of Betty Catalan NP.  Electronically signed: Brian Hunt, 12-14-22 @ 23:11    I, Betty Catalan, personally performed the services described in this documentation. all medical record entries made by the karlaibe were at my direction and in my presence. I have reviewed the chart and agree that the record reflects my personal performance and is accurate and complete  Electronically signed: Betty Catalan NP. Patient seen and examined at the bedside.    Remained critically ill on continuous ICU monitoring.    OBJECTIVE:  Vital Signs Last 24 Hrs  T(C): 38 (14 Dec 2022 20:00), Max: 38 (14 Dec 2022 20:00)  T(F): 100.4 (14 Dec 2022 20:00), Max: 100.4 (14 Dec 2022 20:00)  HR: 107 (14 Dec 2022 23:00) (96 - 118)  BP: --  BP(mean): --  RR: 18 (14 Dec 2022 23:00) (12 - 35)  SpO2: 94% (14 Dec 2022 23:00) (94% - 100%)    Parameters below as of 14 Dec 2022 20:00  Patient On (Oxygen Delivery Method): ventilator    O2 Concentration (%): 40      Physical Exam:   General: Intubated, multiple lines gtt  Neurology: on/off sedation  Eyes: bilateral pupils equal and reactive   ENT/Neck: +ETT midline, Neck supple, trachea midline, No JVD   Respiratory: rhonchi bilaterally   CV: S1S2, no murmurs        [x] Sinus Tachycardia  Abdominal: Softly distended,+BS   Extremities: 1+ pedal edema noted, + peripheral pulses palpable, warm  Skin: No Rashes, Hematoma, Ecchymosis           Assessment:  61 y/o male with PMH of CABG, DM, HTN, HLD presenting as transfer from [place] for c/f STEMI. PTA arrival received 325 aspirin, 600 plavix, and no heparin drip started. Around 1:30 am patient had multiple episodes of non-bloody diarrhea and emesis with associated abdominal pain and chest pain, unable to localize, non-radiating, with associated SOB and no associated palpitations or diaphoresis. No recent fevers/chills, cough, rashes, or changes in urination. Does report mild diffuse back pain; started 5 days ago in setting on injury while walking and lifting objects. Denies focal weakness, numbness/tingling, or LOC due does report mild dizziness.    acute respiratory distress/failure, intubated ,   cardiogenic shock s/p VA ECMO decannulated 12/8  CAD/ASHD/CABG, acute MI  cardiogenic shock  Hemodynamic instability  acute renal failure  encounter management IABP  encounter weaning ventilator  Leukocytosis   Thrombocytopenia  Anemia  acute pancreatitis    Plan:   ***Neuro***  CT head showed 4mm subdural hematoma 11/26: repeat CT head unchanged 12/01,  [x] Sedated with [x] Precedex for vent synchrony  Post operative neuro assessment   Soft palate laceration, ENT scoped 12/8, stable, no acute intervention at this time  Repeat Head CT 12/13 Similar minimal increased density along the right aspect of the posterior   falx and right tentorial leaf, consistent with minimal residual subdural   hemorrhage.    No parenchymal hemorrhage or brain edema.    ***Cardiovascular***  12/8 TTE EF 20-25%, Severe global left ventricular systolic dysfunction.  12/8 GRZEGORZ EF 40-45%, MR, TR, normal RV function  12/7 TTE turndown EF 30-40%, normal RV, MR mod   Invasive hemodynamic monitoring, assess perfusion indices   ST / CVP 15/ MAP 89/ Hct 29.3/ Lactate 0.9  [x] Primacor- 0.1 mcgs/kg/min [x] Vasopressin 0.02 Units  [x] 12/7 cath patent LIMA graft and other grafts occluded, femoral IABP placed, with good augmentation @ 1:1,  [x] 12/8 ECMO d/c'd  PO Amiodarone for afib prophylaxis   Continuos reassessment of hemodynamics   12/8 Aflutter s/p DCCV x2  [x] AC Therapy with Heparin gtt PTT 44  [x] Statin   11/29 HIT NG, 12/8 MARY Neg  Remains on primacor, will continue until after IABP wean    ***Pulmonary***  Post op vent management   Titration of FiO2 and PEEP, follow SpO2, CXR, blood gasses   CPAP trials as tolerated     Mode: AC/ CMV (Assist Control/ Continuous Mandatory Ventilation)  RR (machine): 14  FiO2: 40  PEEP: 5  ITime: 1  MAP: 11  PC: 12  PIP: 20            ***GI***  [x] NPO with TF's Glucerna 1.5 @ goal of 65cc/hr   [x] Protonix    Bowel regimen with Miralax and senna  Reglan for gut motility    ***Renal***  ERIC, renal following CVVHD started 12/5  Continue to monitor I/Os, BUN/Creatinine.   Replete lytes PRN  De Dios present  CRRT Treatment    ***ID***  Empiric coverage with Vancomycin and Meropenem   Diflucan for prophiolaxia   Plan to CT to rule out infection     ***Endocrine***  [x]  DM2: HbA1c 7.3%                - [x] ISS             - Need tight glycemic control to prevent wound infection.      Patient requires continuous monitoring with bedside rhythm monitoring, pulse oximetry monitoring, and continuous central venous and arterial pressure monitoring; and intermittent blood gas analysis. Care plan discussed with the ICU care team.   Patient remained critical, at risk for life threatening decompensation.    I have spent 30 minutes providing critical care management to this patient.    By signing my name below, I, Erin Palafox, attest that this documentation has been prepared under the direction and in the presence of Betty Catalan NP.  Electronically signed: Brian Hunt, 12-14-22 @ 23:11    I, Betty Catalan, personally performed the services described in this documentation. all medical record entries made by the karlaibe were at my direction and in my presence. I have reviewed the chart and agree that the record reflects my personal performance and is accurate and complete  Electronically signed: Betty Catalan NP. Patient seen and examined at the bedside.    Remained critically ill on continuous ICU monitoring.    OBJECTIVE:  Vital Signs Last 24 Hrs  T(C): 38 (14 Dec 2022 20:00), Max: 38 (14 Dec 2022 20:00)  T(F): 100.4 (14 Dec 2022 20:00), Max: 100.4 (14 Dec 2022 20:00)  HR: 107 (14 Dec 2022 23:00) (96 - 118)  BP: --  BP(mean): --  RR: 18 (14 Dec 2022 23:00) (12 - 35)  SpO2: 94% (14 Dec 2022 23:00) (94% - 100%)    Parameters below as of 14 Dec 2022 20:00  Patient On (Oxygen Delivery Method): ventilator    O2 Concentration (%): 40      Physical Exam:   General: Intubated, multiple lines gtt  Neurology: on/off sedation  Eyes: bilateral pupils equal and reactive   ENT/Neck: +ETT midline, Neck supple, trachea midline, No JVD   Respiratory: rhonchi bilaterally   CV: S1S2, no murmurs        [x] Sinus Tachycardia  Abdominal: Softly distended,+BS   Extremities: 1+ pedal edema noted, + peripheral pulses palpable, warm  Skin: No Rashes, Hematoma, Ecchymosis           Assessment:  63 y/o male with PMH of CABG, DM, HTN, HLD presenting as transfer from [place] for c/f STEMI. PTA arrival received 325 aspirin, 600 plavix, and no heparin drip started. Around 1:30 am patient had multiple episodes of non-bloody diarrhea and emesis with associated abdominal pain and chest pain, unable to localize, non-radiating, with associated SOB and no associated palpitations or diaphoresis. No recent fevers/chills, cough, rashes, or changes in urination. Does report mild diffuse back pain; started 5 days ago in setting on injury while walking and lifting objects. Denies focal weakness, numbness/tingling, or LOC due does report mild dizziness.    acute respiratory distress/failure, intubated ,   cardiogenic shock s/p VA ECMO decannulated 12/8  CAD/ASHD/CABG, acute MI  cardiogenic shock  Hemodynamic instability  acute renal failure  encounter management IABP  encounter weaning ventilator  Leukocytosis   Thrombocytopenia  Anemia  acute pancreatitis    Plan:   ***Neuro***  CT head showed 4mm subdural hematoma 11/26: repeat CT head unchanged 12/01,  [x] Sedated with [x] Precedex for vent synchrony  Post operative neuro assessment   Soft palate laceration, ENT scoped 12/8, stable, no acute intervention at this time  Repeat Head CT 12/13 Similar minimal increased density along the right aspect of the posterior   falx and right tentorial leaf, consistent with minimal residual subdural   hemorrhage.    No parenchymal hemorrhage or brain edema.    ***Cardiovascular***  12/8 TTE EF 20-25%, Severe global left ventricular systolic dysfunction.  12/8 GRZEGORZ EF 40-45%, MR, TR, normal RV function  12/7 TTE turndown EF 30-40%, normal RV, MR mod   Invasive hemodynamic monitoring, assess perfusion indices   ST / CVP 15/ MAP 89/ Hct 29.3/ Lactate 0.9  [x] Primacor- 0.1 mcgs/kg/min [x] Vasopressin 0.02 Units  [x] 12/7 cath patent LIMA graft and other grafts occluded, femoral IABP placed, with good augmentation @ 1:1,  [x] 12/8 ECMO d/c'd  PO Amiodarone for afib prophylaxis   Continuos reassessment of hemodynamics   12/8 Aflutter s/p DCCV x2  [x] AC Therapy with Heparin gtt PTT 44  [x] Statin   11/29 HIT NG, 12/8 MAYR Neg  Remains on primacor, will continue until after IABP wean    ***Pulmonary***  Post op vent management   Titration of FiO2 and PEEP, follow SpO2, CXR, blood gasses   CPAP trials as tolerated     Mode: AC/ CMV (Assist Control/ Continuous Mandatory Ventilation)  RR (machine): 14  FiO2: 40  PEEP: 5  ITime: 1  MAP: 11  PC: 12  PIP: 20            ***GI***  [x] NPO with TF's Glucerna 1.5 @ goal of 65cc/hr   [x] Protonix    Bowel regimen with Miralax and senna  Reglan for gut motility    ***Renal***  ERIC, renal following CVVHD started 12/5  Continue to monitor I/Os, BUN/Creatinine.   Replete lytes PRN  De Dios present  CRRT Treatment    ***ID***  Empiric coverage with Vancomycin and Meropenem   Diflucan for prophiolaxia   Plan to CT to rule out infection     ***Endocrine***  [x]  DM2: HbA1c 7.3%                - [x] ISS             - Need tight glycemic control to prevent wound infection.      Patient requires continuous monitoring with bedside rhythm monitoring, pulse oximetry monitoring, and continuous central venous and arterial pressure monitoring; and intermittent blood gas analysis. Care plan discussed with the ICU care team.   Patient remained critical, at risk for life threatening decompensation.    I have spent 30 minutes providing critical care management to this patient.    By signing my name below, I, Erin Palafox, attest that this documentation has been prepared under the direction and in the presence of Betty Catalan NP.  Electronically signed: Brian Hunt, 12-14-22 @ 23:11    I, Betty Catalan, personally performed the services described in this documentation. all medical record entries made by the karlaibe were at my direction and in my presence. I have reviewed the chart and agree that the record reflects my personal performance and is accurate and complete  Electronically signed: Betty Catalan NP. Patient seen and examined at the bedside.    Remained critically ill on continuous ICU monitoring.    OBJECTIVE:  Vital Signs Last 24 Hrs  T(C): 38 (14 Dec 2022 20:00), Max: 38 (14 Dec 2022 20:00)  T(F): 100.4 (14 Dec 2022 20:00), Max: 100.4 (14 Dec 2022 20:00)  HR: 107 (14 Dec 2022 23:00) (96 - 118)  BP: --  BP(mean): --  RR: 18 (14 Dec 2022 23:00) (12 - 35)  SpO2: 94% (14 Dec 2022 23:00) (94% - 100%)    Parameters below as of 14 Dec 2022 20:00  Patient On (Oxygen Delivery Method): ventilator    O2 Concentration (%): 40      Physical Exam:   General: Intubated, multiple lines gtt  Neurology: on/off sedation  Eyes: bilateral pupils equal and reactive   ENT/Neck: +ETT midline, Neck supple, trachea midline, No JVD   Respiratory: rhonchi bilaterally   CV: S1S2, no murmurs        [x] Sinus Tachycardia  Abdominal: Softly distended,+BS   Extremities: 1+ pedal edema noted, + peripheral pulses palpable, warm  Skin: No Rashes, Hematoma, Ecchymosis           Assessment:  63 y/o male with PMH of CABG, DM, HTN, HLD presenting as transfer from [place] for c/f STEMI. PTA arrival received 325 aspirin, 600 plavix, and no heparin drip started. Around 1:30 am patient had multiple episodes of non-bloody diarrhea and emesis with associated abdominal pain and chest pain, unable to localize, non-radiating, with associated SOB and no associated palpitations or diaphoresis. No recent fevers/chills, cough, rashes, or changes in urination. Does report mild diffuse back pain; started 5 days ago in setting on injury while walking and lifting objects. Denies focal weakness, numbness/tingling, or LOC due does report mild dizziness.    acute respiratory distress/failure, intubated ,   cardiogenic shock s/p VA ECMO decannulated 12/8  CAD/ASHD/CABG, acute MI  cardiogenic shock  Hemodynamic instability  acute renal failure  encounter management IABP  encounter weaning ventilator  Leukocytosis   Thrombocytopenia  Anemia  acute pancreatitis    Today:  Trach planning poss Fri  IABP wean, now planning mitral clip     Plan:   ***Neuro***  CT head showed 4mm subdural hematoma 11/26: repeat CT head unchanged 12/01,  [x] Sedated with [x] Precedex for vent synchrony  Post operative neuro assessment   Soft palate laceration, ENT scoped 12/8, stable, no acute intervention at this time  Repeat Head CT 12/13 Similar minimal increased density along the right aspect of the posterior   falx and right tentorial leaf, consistent with minimal residual subdural   hemorrhage.    No parenchymal hemorrhage or brain edema.    ***Cardiovascular***  12/8 TTE EF 20-25%, Severe global left ventricular systolic dysfunction.  12/8 GRZEGORZ EF 40-45%, MR, TR, normal RV function  12/7 TTE turndown EF 30-40%, normal RV, MR mod   Invasive hemodynamic monitoring, assess perfusion indices   ST / CVP 15/ MAP 89/ Hct 29.3/ Lactate 0.9  [x] Primacor- 0.1 mcgs/kg/min [x] Vasopressin 0.02 Units  [x] 12/7 cath patent LIMA graft and other grafts occluded, femoral IABP placed, with good augmentation @ 1:1,  [x] 12/8 ECMO d/c'd  PO Amiodarone for afib prophylaxis   Continuos reassessment of hemodynamics   12/8 Aflutter s/p DCCV x2  [x] AC Therapy with Heparin gtt PTT 44  [x] Statin   11/29 HIT NG, 12/8 MARY Neg  Remains on primacor, will continue until after IABP wean    ***Pulmonary***  Post op vent management   Titration of FiO2 and PEEP, follow SpO2, CXR, blood gasses   CPAP trials as tolerated     Mode: AC/ CMV (Assist Control/ Continuous Mandatory Ventilation)  RR (machine): 14  FiO2: 40  PEEP: 5  ITime: 1  MAP: 11  PC: 12  PIP: 20            ***GI***  [x] NPO with TF's Glucerna 1.5 @ goal of 65cc/hr   [x] Protonix    Bowel regimen with Miralax and senna  Reglan for gut motility    ***Renal***  ERIC, renal following CVVHD started 12/5  Continue to monitor I/Os, BUN/Creatinine.   Replete lytes PRN  De Dios present  CRRT Treatment    ***ID***  Empiric coverage with Vancomycin and Meropenem   Diflucan for prophiolaxia   Plan to CT to rule out infection     ***Endocrine***  [x]  DM2: HbA1c 7.3%                - [x] ISS             - Need tight glycemic control to prevent wound infection.      Patient requires continuous monitoring with bedside rhythm monitoring, pulse oximetry monitoring, and continuous central venous and arterial pressure monitoring; and intermittent blood gas analysis. Care plan discussed with the ICU care team.   Patient remained critical, at risk for life threatening decompensation.    I have spent 30 minutes providing critical care management to this patient.    By signing my name below, I, Erin Palafox, attest that this documentation has been prepared under the direction and in the presence of Betty Catalan NP.  Electronically signed: Brian Hunt, 12-14-22 @ 23:11    I, Betty Catalan, personally performed the services described in this documentation. all medical record entries made by the karlaibe were at my direction and in my presence. I have reviewed the chart and agree that the record reflects my personal performance and is accurate and complete  Electronically signed: Betty Catalan NP. Patient seen and examined at the bedside.    Remained critically ill on continuous ICU monitoring.    OBJECTIVE:  Vital Signs Last 24 Hrs  T(C): 38 (14 Dec 2022 20:00), Max: 38 (14 Dec 2022 20:00)  T(F): 100.4 (14 Dec 2022 20:00), Max: 100.4 (14 Dec 2022 20:00)  HR: 107 (14 Dec 2022 23:00) (96 - 118)  BP: --  BP(mean): --  RR: 18 (14 Dec 2022 23:00) (12 - 35)  SpO2: 94% (14 Dec 2022 23:00) (94% - 100%)    Parameters below as of 14 Dec 2022 20:00  Patient On (Oxygen Delivery Method): ventilator    O2 Concentration (%): 40      Physical Exam:   General: Intubated, multiple lines gtt  Neurology: on/off sedation  Eyes: bilateral pupils equal and reactive   ENT/Neck: +ETT midline, Neck supple, trachea midline, No JVD   Respiratory: rhonchi bilaterally   CV: S1S2, no murmurs        [x] Sinus Tachycardia  Abdominal: Softly distended,+BS   Extremities: 1+ pedal edema noted, + peripheral pulses palpable, warm  Skin: No Rashes, Hematoma, Ecchymosis           Assessment:  61 y/o male with PMH of CABG, DM, HTN, HLD presenting as transfer from [place] for c/f STEMI. PTA arrival received 325 aspirin, 600 plavix, and no heparin drip started. Around 1:30 am patient had multiple episodes of non-bloody diarrhea and emesis with associated abdominal pain and chest pain, unable to localize, non-radiating, with associated SOB and no associated palpitations or diaphoresis. No recent fevers/chills, cough, rashes, or changes in urination. Does report mild diffuse back pain; started 5 days ago in setting on injury while walking and lifting objects. Denies focal weakness, numbness/tingling, or LOC due does report mild dizziness.    acute respiratory distress/failure, intubated ,   cardiogenic shock s/p [facility] ECMO decannulated 12/8  CAD/ASHD/CABG, acute MI  cardiogenic shock  Hemodynamic instability  acute renal failure  encounter management IABP  encounter weaning ventilator  Leukocytosis   Thrombocytopenia  Anemia  acute pancreatitis    Today:  Trach planning poss Fri  IABP wean, now planning mitral clip     Plan:   ***Neuro***  CT head showed 4mm subdural hematoma 11/26: repeat CT head unchanged 12/01,  [x] Sedated with [x] Precedex for vent synchrony  Post operative neuro assessment   Soft palate laceration, ENT scoped 12/8, stable, no acute intervention at this time  Repeat Head CT 12/13 Similar minimal increased density along the right aspect of the posterior   falx and right tentorial leaf, consistent with minimal residual subdural   hemorrhage.    No parenchymal hemorrhage or brain edema.    ***Cardiovascular***  12/8 TTE EF 20-25%, Severe global left ventricular systolic dysfunction.  12/8 GRZEGORZ EF 40-45%, MR, TR, normal RV function  12/7 TTE turndown EF 30-40%, normal RV, MR mod   Invasive hemodynamic monitoring, assess perfusion indices   ST / CVP 15/ MAP 89/ Hct 29.3/ Lactate 0.9  [x] Primacor- 0.1 mcgs/kg/min [x] Vasopressin 0.02 Units  [x] 12/7 cath patent LIMA graft and other grafts occluded, femoral IABP placed, with good augmentation @ 1:1,  [x] 12/8 ECMO d/c'd  PO Amiodarone for afib prophylaxis   Continuos reassessment of hemodynamics   12/8 Aflutter s/p DCCV x2  [x] AC Therapy with Heparin gtt PTT 44  [x] Statin   11/29 HIT NG, 12/8 MARY Neg  Remains on primacor, will continue until after IABP wean    ***Pulmonary***  Post op vent management   Titration of FiO2 and PEEP, follow SpO2, CXR, blood gasses   CPAP trials as tolerated     Mode: AC/ CMV (Assist Control/ Continuous Mandatory Ventilation)  RR (machine): 14  FiO2: 40  PEEP: 5  ITime: 1  MAP: 11  PC: 12  PIP: 20            ***GI***  [x] NPO with TF's Glucerna 1.5 @ goal of 65cc/hr   [x] Protonix    Bowel regimen with Miralax and senna  Reglan for gut motility    ***Renal***  ERIC, renal following CVVHD started 12/5  Continue to monitor I/Os, BUN/Creatinine.   Replete lytes PRN  De Dios present  CRRT Treatment    ***ID***  Empiric coverage with Vancomycin and Meropenem   Diflucan for prophiolaxia   Plan to CT to rule out infection     ***Endocrine***  [x]  DM2: HbA1c 7.3%                - [x] ISS             - Need tight glycemic control to prevent wound infection.      Patient requires continuous monitoring with bedside rhythm monitoring, pulse oximetry monitoring, and continuous central venous and arterial pressure monitoring; and intermittent blood gas analysis. Care plan discussed with the ICU care team.   Patient remained critical, at risk for life threatening decompensation.    I have spent 30 minutes providing critical care management to this patient.    By signing my name below, I, Erin Palafox, attest that this documentation has been prepared under the direction and in the presence of Betty Catalan NP.  Electronically signed: Brian Hunt, 12-14-22 @ 23:11    I, Betty Catalan, personally performed the services described in this documentation. all medical record entries made by the karlaibe were at my direction and in my presence. I have reviewed the chart and agree that the record reflects my personal performance and is accurate and complete  Electronically signed: Betty Catalan NP. Patient seen and examined at the bedside.    Remained critically ill on continuous ICU monitoring.    OBJECTIVE:  Vital Signs Last 24 Hrs  T(C): 38 (14 Dec 2022 20:00), Max: 38 (14 Dec 2022 20:00)  T(F): 100.4 (14 Dec 2022 20:00), Max: 100.4 (14 Dec 2022 20:00)  HR: 107 (14 Dec 2022 23:00) (96 - 118)  BP: --  BP(mean): --  RR: 18 (14 Dec 2022 23:00) (12 - 35)  SpO2: 94% (14 Dec 2022 23:00) (94% - 100%)    Parameters below as of 14 Dec 2022 20:00  Patient On (Oxygen Delivery Method): ventilator    O2 Concentration (%): 40      Physical Exam:   General: Intubated, multiple lines gtt  Neurology: on/off sedation  Eyes: bilateral pupils equal and reactive   ENT/Neck: +ETT midline, Neck supple, trachea midline, No JVD   Respiratory: rhonchi bilaterally   CV: S1S2, no murmurs        [x] Sinus Tachycardia  Abdominal: Softly distended,+BS   Extremities: 1+ pedal edema noted, + peripheral pulses palpable, warm  Skin: No Rashes, Hematoma, Ecchymosis           Assessment:  61 y/o male with PMH of CABG, DM, HTN, HLD presenting as transfer from [place] for c/f STEMI. PTA arrival received 325 aspirin, 600 plavix, and no heparin drip started. Around 1:30 am patient had multiple episodes of non-bloody diarrhea and emesis with associated abdominal pain and chest pain, unable to localize, non-radiating, with associated SOB and no associated palpitations or diaphoresis. No recent fevers/chills, cough, rashes, or changes in urination. Does report mild diffuse back pain; started 5 days ago in setting on injury while walking and lifting objects. Denies focal weakness, numbness/tingling, or LOC due does report mild dizziness.    acute respiratory distress/failure, intubated ,   cardiogenic shock s/p [facility] ECMO decannulated 12/8  CAD/ASHD/CABG, acute MI  cardiogenic shock  Hemodynamic instability  acute renal failure  encounter management IABP  encounter weaning ventilator  Leukocytosis   Thrombocytopenia  Anemia  acute pancreatitis    Today:  Trach planning poss Fri  IABP wean, now planning mitral clip     Plan:   ***Neuro***  CT head showed 4mm subdural hematoma 11/26: repeat CT head unchanged 12/01,  [x] Sedated with [x] Precedex for vent synchrony  Post operative neuro assessment   Soft palate laceration, ENT scoped 12/8, stable, no acute intervention at this time  Repeat Head CT 12/13 Similar minimal increased density along the right aspect of the posterior   falx and right tentorial leaf, consistent with minimal residual subdural   hemorrhage.    No parenchymal hemorrhage or brain edema.    ***Cardiovascular***  12/8 TTE EF 20-25%, Severe global left ventricular systolic dysfunction.  12/8 GRZEGORZ EF 40-45%, MR, TR, normal RV function  12/7 TTE turndown EF 30-40%, normal RV, MR mod   Invasive hemodynamic monitoring, assess perfusion indices   ST / CVP 15/ MAP 89/ Hct 29.3/ Lactate 0.9  [x] Primacor- 0.1 mcgs/kg/min [x] Vasopressin 0.02 Units  [x] 12/7 cath patent LIMA graft and other grafts occluded, femoral IABP placed, with good augmentation @ 1:1,  [x] 12/8 ECMO d/c'd  PO Amiodarone for afib prophylaxis   Continuos reassessment of hemodynamics   12/8 Aflutter s/p DCCV x2  [x] AC Therapy with Heparin gtt PTT 44  [x] Statin   11/29 HIT NG, 12/8 MARY Neg  Remains on primacor, will continue until after IABP wean    ***Pulmonary***  Post op vent management   Titration of FiO2 and PEEP, follow SpO2, CXR, blood gasses   CPAP trials as tolerated     Mode: AC/ CMV (Assist Control/ Continuous Mandatory Ventilation)  RR (machine): 14  FiO2: 40  PEEP: 5  ITime: 1  MAP: 11  PC: 12  PIP: 20            ***GI***  [x] NPO with TF's Glucerna 1.5 @ goal of 65cc/hr   [x] Protonix    Bowel regimen with Miralax and senna  Reglan for gut motility    ***Renal***  ERIC, renal following CVVHD started 12/5  Continue to monitor I/Os, BUN/Creatinine.   Replete lytes PRN  De Dios present  CRRT Treatment    ***ID***  Empiric coverage with Vancomycin and Meropenem   Diflucan for prophiolaxia   Plan to CT to rule out infection     ***Endocrine***  [x]  DM2: HbA1c 7.3%                - [x] ISS             - Need tight glycemic control to prevent wound infection.      Patient requires continuous monitoring with bedside rhythm monitoring, pulse oximetry monitoring, and continuous central venous and arterial pressure monitoring; and intermittent blood gas analysis. Care plan discussed with the ICU care team.   Patient remained critical, at risk for life threatening decompensation.    I have spent 30 minutes providing critical care management to this patient.    By signing my name below, I, Erin Palafox, attest that this documentation has been prepared under the direction and in the presence of Betty Catalan NP.  Electronically signed: Brian Hunt, 12-14-22 @ 23:11    I, Betty Catalan, personally performed the services described in this documentation. all medical record entries made by the karlaibe were at my direction and in my presence. I have reviewed the chart and agree that the record reflects my personal performance and is accurate and complete  Electronically signed: Betty Catalan NP. Patient seen and examined at the bedside.    Remained critically ill on continuous ICU monitoring.    OBJECTIVE:  Vital Signs Last 24 Hrs  T(C): 38 (14 Dec 2022 20:00), Max: 38 (14 Dec 2022 20:00)  T(F): 100.4 (14 Dec 2022 20:00), Max: 100.4 (14 Dec 2022 20:00)  HR: 107 (14 Dec 2022 23:00) (96 - 118)  BP: --  BP(mean): --  RR: 18 (14 Dec 2022 23:00) (12 - 35)  SpO2: 94% (14 Dec 2022 23:00) (94% - 100%)    Parameters below as of 14 Dec 2022 20:00  Patient On (Oxygen Delivery Method): ventilator    O2 Concentration (%): 40      Physical Exam:   General: Intubated, multiple lines gtt  Neurology: on/off sedation  Eyes: bilateral pupils equal and reactive   ENT/Neck: +ETT midline, Neck supple, trachea midline, No JVD   Respiratory: rhonchi bilaterally   CV: S1S2, no murmurs        [x] Sinus Tachycardia  Abdominal: Softly distended,+BS   Extremities: 1+ pedal edema noted, + peripheral pulses palpable, warm  Skin: No Rashes, Hematoma, Ecchymosis           Assessment:  63 y/o male with PMH of CABG, DM, HTN, HLD presenting as transfer from [place] for c/f STEMI. PTA arrival received 325 aspirin, 600 plavix, and no heparin drip started. Around 1:30 am patient had multiple episodes of non-bloody diarrhea and emesis with associated abdominal pain and chest pain, unable to localize, non-radiating, with associated SOB and no associated palpitations or diaphoresis. No recent fevers/chills, cough, rashes, or changes in urination. Does report mild diffuse back pain; started 5 days ago in setting on injury while walking and lifting objects. Denies focal weakness, numbness/tingling, or LOC due does report mild dizziness.    acute respiratory distress/failure, intubated ,   cardiogenic shock s/p [facility] ECMO decannulated 12/8  CAD/ASHD/CABG, acute MI  cardiogenic shock  Hemodynamic instability  acute renal failure  encounter management IABP  encounter weaning ventilator  Leukocytosis   Thrombocytopenia  Anemia  acute pancreatitis    Today:  Trach planning poss Fri  IABP wean, now planning mitral clip     Plan:   ***Neuro***  CT head showed 4mm subdural hematoma 11/26: repeat CT head unchanged 12/01,  [x] Sedated with [x] Precedex for vent synchrony  Post operative neuro assessment   Soft palate laceration, ENT scoped 12/8, stable, no acute intervention at this time  Repeat Head CT 12/13 Similar minimal increased density along the right aspect of the posterior   falx and right tentorial leaf, consistent with minimal residual subdural   hemorrhage.    No parenchymal hemorrhage or brain edema.    ***Cardiovascular***  12/8 TTE EF 20-25%, Severe global left ventricular systolic dysfunction.  12/8 GRZEGORZ EF 40-45%, MR, TR, normal RV function  12/7 TTE turndown EF 30-40%, normal RV, MR mod   Invasive hemodynamic monitoring, assess perfusion indices   ST / CVP 15/ MAP 89/ Hct 29.3/ Lactate 0.9  [x] Primacor- 0.1 mcgs/kg/min [x] Vasopressin 0.02 Units  [x] 12/7 cath patent LIMA graft and other grafts occluded, femoral IABP placed, with good augmentation @ 1:1,  [x] 12/8 ECMO d/c'd  PO Amiodarone for afib prophylaxis   Continuos reassessment of hemodynamics   12/8 Aflutter s/p DCCV x2  [x] AC Therapy with Heparin gtt PTT 44  [x] Statin   11/29 HIT NG, 12/8 MARY Neg  Remains on primacor    ***Pulmonary***  Post op vent management   Titration of FiO2 and PEEP, follow SpO2, CXR, blood gasses   CPAP trials as tolerated     Mode: AC/ CMV (Assist Control/ Continuous Mandatory Ventilation)  RR (machine): 14  FiO2: 40  PEEP: 5  ITime: 1  MAP: 11  PC: 12  PIP: 20            ***GI***  [x] NPO with TF's Glucerna 1.5 @ goal of 65cc/hr   [x] Protonix    Bowel regimen with Miralax and senna  Reglan for gut motility    ***Renal***  ERIC, renal following CVVHD started 12/5  Continue to monitor I/Os, BUN/Creatinine.   Replete lytes PRN  De Dios present  CRRT Treatment    ***ID***  Empiric coverage with Vancomycin and Meropenem   Diflucan for prophiolaxia   Plan to CT to rule out infection     ***Endocrine***  [x]  DM2: HbA1c 7.3%                - [x] ISS             - Need tight glycemic control to prevent wound infection.      Patient requires continuous monitoring with bedside rhythm monitoring, pulse oximetry monitoring, and continuous central venous and arterial pressure monitoring; and intermittent blood gas analysis. Care plan discussed with the ICU care team.   Patient remained critical, at risk for life threatening decompensation.    I have spent 30 minutes providing critical care management to this patient.    By signing my name below, I, Erin Palafox, attest that this documentation has been prepared under the direction and in the presence of Betty Catalan NP.  Electronically signed: Brina Hunt, 12-14-22 @ 23:11    I, Betty Catalan, personally performed the services described in this documentation. all medical record entries made by the karlaibkyle were at my direction and in my presence. I have reviewed the chart and agree that the record reflects my personal performance and is accurate and complete  Electronically signed: Betty Catalan NP. Patient seen and examined at the bedside.    Remained critically ill on continuous ICU monitoring.    OBJECTIVE:  Vital Signs Last 24 Hrs  T(C): 38 (14 Dec 2022 20:00), Max: 38 (14 Dec 2022 20:00)  T(F): 100.4 (14 Dec 2022 20:00), Max: 100.4 (14 Dec 2022 20:00)  HR: 107 (14 Dec 2022 23:00) (96 - 118)  BP: --  BP(mean): --  RR: 18 (14 Dec 2022 23:00) (12 - 35)  SpO2: 94% (14 Dec 2022 23:00) (94% - 100%)    Parameters below as of 14 Dec 2022 20:00  Patient On (Oxygen Delivery Method): ventilator    O2 Concentration (%): 40      Physical Exam:   General: Intubated, multiple lines gtt  Neurology: on/off sedation  Eyes: bilateral pupils equal and reactive   ENT/Neck: +ETT midline, Neck supple, trachea midline, No JVD   Respiratory: rhonchi bilaterally   CV: S1S2, no murmurs        [x] Sinus Tachycardia  Abdominal: Softly distended,+BS   Extremities: 1+ pedal edema noted, + peripheral pulses palpable, warm  Skin: No Rashes, Hematoma, Ecchymosis           Assessment:  61 y/o male with PMH of CABG, DM, HTN, HLD presenting as transfer from [place] for c/f STEMI. PTA arrival received 325 aspirin, 600 plavix, and no heparin drip started. Around 1:30 am patient had multiple episodes of non-bloody diarrhea and emesis with associated abdominal pain and chest pain, unable to localize, non-radiating, with associated SOB and no associated palpitations or diaphoresis. No recent fevers/chills, cough, rashes, or changes in urination. Does report mild diffuse back pain; started 5 days ago in setting on injury while walking and lifting objects. Denies focal weakness, numbness/tingling, or LOC due does report mild dizziness.    acute respiratory distress/failure, intubated ,   cardiogenic shock s/p VA ECMO decannulated 12/8  CAD/ASHD/CABG, acute MI  cardiogenic shock  Hemodynamic instability  acute renal failure  encounter management IABP  encounter weaning ventilator  Leukocytosis   Thrombocytopenia  Anemia  acute pancreatitis    Today:  Trach planning poss Fri  IABP wean, now planning mitral clip     Plan:   ***Neuro***  CT head showed 4mm subdural hematoma 11/26: repeat CT head unchanged 12/01,  [x] Sedated with [x] Precedex for vent synchrony  Post operative neuro assessment   Soft palate laceration, ENT scoped 12/8, stable, no acute intervention at this time  Repeat Head CT 12/13 Similar minimal increased density along the right aspect of the posterior   falx and right tentorial leaf, consistent with minimal residual subdural   hemorrhage.    No parenchymal hemorrhage or brain edema.    ***Cardiovascular***  12/8 TTE EF 20-25%, Severe global left ventricular systolic dysfunction.  12/8 GRZEGORZ EF 40-45%, MR, TR, normal RV function  12/7 TTE turndown EF 30-40%, normal RV, MR mod   Invasive hemodynamic monitoring, assess perfusion indices   ST / CVP 15/ MAP 89/ Hct 29.3/ Lactate 0.9  [x] Primacor- 0.1 mcgs/kg/min [x] Vasopressin 0.02 Units  [x] 12/7 cath patent LIMA graft and other grafts occluded, femoral IABP placed, with good augmentation @ 1:1,  [x] 12/8 ECMO d/c'd  PO Amiodarone for afib prophylaxis   Continuos reassessment of hemodynamics   12/8 Aflutter s/p DCCV x2  [x] AC Therapy with Heparin gtt PTT 44  [x] Statin   11/29 HIT NG, 12/8 MARY Neg  Remains on primacor    ***Pulmonary***  Post op vent management   Titration of FiO2 and PEEP, follow SpO2, CXR, blood gasses   CPAP trials as tolerated     Mode: AC/ CMV (Assist Control/ Continuous Mandatory Ventilation)  RR (machine): 14  FiO2: 40  PEEP: 5  ITime: 1  MAP: 11  PC: 12  PIP: 20            ***GI***  [x] NPO with TF's Glucerna 1.5 @ goal of 65cc/hr   [x] Protonix    Bowel regimen with Miralax and senna  Reglan for gut motility    ***Renal***  ERIC, renal following CVVHD started 12/5  Continue to monitor I/Os, BUN/Creatinine.   Replete lytes PRN  De Dios present  CRRT Treatment    ***ID***  Empiric coverage with Vancomycin and Meropenem   Diflucan for prophiolaxia   Plan to CT to rule out infection     ***Endocrine***  [x]  DM2: HbA1c 7.3%                - [x] ISS             - Need tight glycemic control to prevent wound infection.      Patient requires continuous monitoring with bedside rhythm monitoring, pulse oximetry monitoring, and continuous central venous and arterial pressure monitoring; and intermittent blood gas analysis. Care plan discussed with the ICU care team.   Patient remained critical, at risk for life threatening decompensation.    I have spent 30 minutes providing critical care management to this patient.    By signing my name below, I, Erin Palafox, attest that this documentation has been prepared under the direction and in the presence of Betty Catalan NP.  Electronically signed: Brian Hunt, 12-14-22 @ 23:11    I, Betty Catalan, personally performed the services described in this documentation. all medical record entries made by the karlaibkyle were at my direction and in my presence. I have reviewed the chart and agree that the record reflects my personal performance and is accurate and complete  Electronically signed: Betty Catalan NP. Patient seen and examined at the bedside.    Remained critically ill on continuous ICU monitoring.    OBJECTIVE:  Vital Signs Last 24 Hrs  T(C): 38 (14 Dec 2022 20:00), Max: 38 (14 Dec 2022 20:00)  T(F): 100.4 (14 Dec 2022 20:00), Max: 100.4 (14 Dec 2022 20:00)  HR: 107 (14 Dec 2022 23:00) (96 - 118)  BP: --  BP(mean): --  RR: 18 (14 Dec 2022 23:00) (12 - 35)  SpO2: 94% (14 Dec 2022 23:00) (94% - 100%)    Parameters below as of 14 Dec 2022 20:00  Patient On (Oxygen Delivery Method): ventilator    O2 Concentration (%): 40      Physical Exam:   General: Intubated, multiple lines gtt  Neurology: on/off sedation  Eyes: bilateral pupils equal and reactive   ENT/Neck: +ETT midline, Neck supple, trachea midline, No JVD   Respiratory: rhonchi bilaterally   CV: S1S2, no murmurs        [x] Sinus Tachycardia  Abdominal: Softly distended,+BS   Extremities: 1+ pedal edema noted, + peripheral pulses palpable, warm  Skin: No Rashes, Hematoma, Ecchymosis           Assessment:  63 y/o male with PMH of CABG, DM, HTN, HLD presenting as transfer from [place] for c/f STEMI. PTA arrival received 325 aspirin, 600 plavix, and no heparin drip started. Around 1:30 am patient had multiple episodes of non-bloody diarrhea and emesis with associated abdominal pain and chest pain, unable to localize, non-radiating, with associated SOB and no associated palpitations or diaphoresis. No recent fevers/chills, cough, rashes, or changes in urination. Does report mild diffuse back pain; started 5 days ago in setting on injury while walking and lifting objects. Denies focal weakness, numbness/tingling, or LOC due does report mild dizziness.    acute respiratory distress/failure, intubated ,   cardiogenic shock s/p VA ECMO decannulated 12/8  CAD/ASHD/CABG, acute MI  cardiogenic shock  Hemodynamic instability  acute renal failure  encounter management IABP  encounter weaning ventilator  Leukocytosis   Thrombocytopenia  Anemia  acute pancreatitis    Today:  Trach planning poss Fri  IABP wean, now planning mitral clip     Plan:   ***Neuro***  CT head showed 4mm subdural hematoma 11/26: repeat CT head unchanged 12/01,  [x] Sedated with [x] Precedex for vent synchrony  Post operative neuro assessment   Soft palate laceration, ENT scoped 12/8, stable, no acute intervention at this time  Repeat Head CT 12/13 Similar minimal increased density along the right aspect of the posterior   falx and right tentorial leaf, consistent with minimal residual subdural   hemorrhage.    No parenchymal hemorrhage or brain edema.    ***Cardiovascular***  12/8 TTE EF 20-25%, Severe global left ventricular systolic dysfunction.  12/8 GRZEGORZ EF 40-45%, MR, TR, normal RV function  12/7 TTE turndown EF 30-40%, normal RV, MR mod   Invasive hemodynamic monitoring, assess perfusion indices   ST / CVP 15/ MAP 89/ Hct 29.3/ Lactate 0.9  [x] Primacor- 0.1 mcgs/kg/min [x] Vasopressin 0.02 Units  [x] 12/7 cath patent LIMA graft and other grafts occluded, femoral IABP placed, with good augmentation @ 1:1,  [x] 12/8 ECMO d/c'd  PO Amiodarone for afib prophylaxis   Continuos reassessment of hemodynamics   12/8 Aflutter s/p DCCV x2  [x] AC Therapy with Heparin gtt PTT 44  [x] Statin   11/29 HIT NG, 12/8 MARY Neg  Remains on primacor    ***Pulmonary***  Post op vent management   Titration of FiO2 and PEEP, follow SpO2, CXR, blood gasses   CPAP trials as tolerated     Mode: AC/ CMV (Assist Control/ Continuous Mandatory Ventilation)  RR (machine): 14  FiO2: 40  PEEP: 5  ITime: 1  MAP: 11  PC: 12  PIP: 20            ***GI***  [x] NPO with TF's Glucerna 1.5 @ goal of 65cc/hr   [x] Protonix    Bowel regimen with Miralax and senna  Reglan for gut motility    ***Renal***  ERIC, renal following CVVHD started 12/5  Continue to monitor I/Os, BUN/Creatinine.   Replete lytes PRN  De Dios present  CRRT Treatment    ***ID***  Empiric coverage with Vancomycin and Meropenem   Diflucan for prophiolaxia   Plan to CT to rule out infection     ***Endocrine***  [x]  DM2: HbA1c 7.3%                - [x] ISS             - Need tight glycemic control to prevent wound infection.      Patient requires continuous monitoring with bedside rhythm monitoring, pulse oximetry monitoring, and continuous central venous and arterial pressure monitoring; and intermittent blood gas analysis. Care plan discussed with the ICU care team.   Patient remained critical, at risk for life threatening decompensation.    I have spent 30 minutes providing critical care management to this patient.    By signing my name below, I, Erin Palafox, attest that this documentation has been prepared under the direction and in the presence of Betty Catalan NP.  Electronically signed: Brian Hunt, 12-14-22 @ 23:11    I, Betty Catalan, personally performed the services described in this documentation. all medical record entries made by the karlaibkyle were at my direction and in my presence. I have reviewed the chart and agree that the record reflects my personal performance and is accurate and complete  Electronically signed: Betty Catalan NP.

## 2022-12-14 NOTE — PROGRESS NOTE ADULT - SUBJECTIVE AND OBJECTIVE BOX
CRITICAL CARE ATTENDING - CTICU    MEDICATIONS  (STANDING):  aMIOdarone    Tablet   Oral   aMIOdarone    Tablet 400 milliGRAM(s) Oral every 8 hours  atorvastatin 80 milliGRAM(s) Oral at bedtime  chlorhexidine 0.12% Liquid 15 milliLiter(s) Oral Mucosa every 12 hours  chlorhexidine 2% Cloths 1 Application(s) Topical <User Schedule>  CRRT Treatment    <Continuous>  dexMEDEtomidine Infusion 0.3 MICROgram(s)/kG/Hr (6.97 mL/Hr) IV Continuous <Continuous>  fluconAZOLE IVPB      fluconAZOLE IVPB 200 milliGRAM(s) IV Intermittent every 24 hours  heparin  Infusion 800 Unit(s)/Hr (16 mL/Hr) IV Continuous <Continuous>  insulin lispro (ADMELOG) corrective regimen sliding scale   SubCutaneous every 4 hours  meropenem  IVPB 1000 milliGRAM(s) IV Intermittent every 12 hours  metoclopramide Injectable 5 milliGRAM(s) IV Push every 8 hours  milrinone Infusion 0.1 MICROgram(s)/kG/Min (2.79 mL/Hr) IV Continuous <Continuous>  multivitamin/minerals/iron Oral Solution (CENTRUM) 15 milliLiter(s) Oral daily  pantoprazole  Injectable 40 milliGRAM(s) IV Push daily  Phoxillum Filtration BK 4 / 2.5 5000 milliLiter(s) (2200 mL/Hr) CRRT <Continuous>  Phoxillum Filtration BK 4 / 2.5 5000 milliLiter(s) (800 mL/Hr) CRRT <Continuous>  polyethylene glycol 3350 17 Gram(s) Oral two times a day  PrismaSOL Filtration BGK 4 / 2.5 5000 milliLiter(s) (200 mL/Hr) CRRT <Continuous>  senna 2 Tablet(s) Oral at bedtime  sodium chloride 0.9%. 1000 milliLiter(s) (5 mL/Hr) IV Continuous <Continuous>  thiamine 100 milliGRAM(s) Enteral Tube daily  vancomycin  IVPB 1000 milliGRAM(s) IV Intermittent every 24 hours                            9.2    15.40 )-----------( 119      ( 14 Dec 2022 00:34 )             29.3       12-14    135  |  102  |  22  ----------------------------<  157<H>  3.8   |  22  |  1.43<H>    Ca    8.0<L>      14 Dec 2022 00:34  Phos  3.7     12-14  Mg     2.4     12-14    TPro  6.3  /  Alb  2.9<L>  /  TBili  1.0  /  DBili  x   /  AST  33  /  ALT  14  /  AlkPhos  118  12-14      PT/INR - ( 14 Dec 2022 00:34 )   PT: 12.5 sec;   INR: 1.08 ratio         PTT - ( 14 Dec 2022 05:30 )  PTT:61.0 sec    Mode: AC/ CMV (Assist Control/ Continuous Mandatory Ventilation)  RR (machine): 14  FiO2: 40  PEEP: 5  ITime: 1  MAP: 10  PC: 12  PIP: 17      Daily     Daily Weight in k.5 (14 Dec 2022 00:00)       @ 07:01  -   @ 07:00  --------------------------------------------------------  IN: 2686.5 mL / OUT: 4689 mL / NET: -2002.5 mL      Critically Ill patient  : [ ] preoperative ,   [ ] post operative  [x] Non Operative    Requires :  [x ] Arterial Line   [ x] Central Line  [ ] PA catheter  [x] IABP  [ ] ECMO  [ ] LVAD  [x ] Ventilator  [ ] pacemaker [ ] Impella.                      [x ] ABG's     [x ] Pulse Oxymetry Monitoring  Bedside evaluation , monitoring , treatment of hemodynamics , fluids , IVP/ IVCD meds.        Diagnosis:      - [facility] - arrest in [facility]ICU     ECMO Decannulation      POD - IABP placed in cath lab     MI     Acute Pancreatitis     ARDS     Hemodynamic lability,  instability. Requires IVCD [ ] vasopressors [x] inotropes  [ ] vasodilator  [x]IVSS fluid  to maintain MAP, perfusion, C.I.     Post Arrest Shock state    Hypotension     CHF- acute [ x]   chronic [ x]    systolic [ x]   diatolic [ ]          - Echo- EF -   30-40% Severe segmental LV systolic dysfunction          [ ] RV dysfunction     - Cxr-cardiomegally, edema          - Clinical-  [x ]inotropes   [ ]pressors [ ]diuresis   [x]IABP      [ ]LVAD   [x]Respiratory Failure.     Cardiogenic Shock - resolving    Renal Failure - Acute Kidney Injury     CVVHD     Thrombocytopenia     IVCD anticoagulation with [x] Heparin  [ ] Argatroban for  ECMO / MI    IABP   [x] management   [ ] wean 1:1 1:2 1:3   [ ] removal and f/u vascular checks     ISS- DM    Requires bedside physical therapy, mobilization and total [facility] care.     IVCD Precedex  for vent synchrony     Respiratory Failure     Requires chest PT, pulmonary toilet, ambu bagging, suctioning to maintain SaO2,  patent airway and treat atelectasis.     Ventilator Management:  [x ]AC-rest    [ x]CPAP-PS Wean    [ ]Trach Collar     [ ]Extubate    [ ] T-Piece  [ ]peep>5     Difficult weaning process - multiple organ system involvement in critically ill patient     Cardioverted from A Flutter at 140/min to /min    Tolerates NG / [place] feeds at [x] goal rate - currently off [ ] trophic rate    [ ]       rate                 By signing my name below, I, Rosio Alonzo, attest that this documentation has been prepared under the direction and in the presence of Finn Zelaya MD.   Electronically Signed: Brian Burnett 22 @ 07:34      Discussed with CT surgeon, Physician Assistant - Nurse Practitioner- Critical care medicine team.   Discussed at  AM / PM rounds.   Chart, labs , films reviewed.    Cumulative Critical Care Time Given Today:  CRITICAL CARE ATTENDING - CTICU    MEDICATIONS  (STANDING):  aMIOdarone    Tablet   Oral   aMIOdarone    Tablet 400 milliGRAM(s) Oral every 8 hours  atorvastatin 80 milliGRAM(s) Oral at bedtime  chlorhexidine 0.12% Liquid 15 milliLiter(s) Oral Mucosa every 12 hours  chlorhexidine 2% Cloths 1 Application(s) Topical <User Schedule>  CRRT Treatment    <Continuous>  dexMEDEtomidine Infusion 0.3 MICROgram(s)/kG/Hr (6.97 mL/Hr) IV Continuous <Continuous>  fluconAZOLE IVPB      fluconAZOLE IVPB 200 milliGRAM(s) IV Intermittent every 24 hours  heparin  Infusion 800 Unit(s)/Hr (16 mL/Hr) IV Continuous <Continuous>  insulin lispro (ADMELOG) corrective regimen sliding scale   SubCutaneous every 4 hours  meropenem  IVPB 1000 milliGRAM(s) IV Intermittent every 12 hours  metoclopramide Injectable 5 milliGRAM(s) IV Push every 8 hours  milrinone Infusion 0.1 MICROgram(s)/kG/Min (2.79 mL/Hr) IV Continuous <Continuous>  multivitamin/minerals/iron Oral Solution (CENTRUM) 15 milliLiter(s) Oral daily  pantoprazole  Injectable 40 milliGRAM(s) IV Push daily  Phoxillum Filtration BK 4 / 2.5 5000 milliLiter(s) (2200 mL/Hr) CRRT <Continuous>  Phoxillum Filtration BK 4 / 2.5 5000 milliLiter(s) (800 mL/Hr) CRRT <Continuous>  polyethylene glycol 3350 17 Gram(s) Oral two times a day  PrismaSOL Filtration BGK 4 / 2.5 5000 milliLiter(s) (200 mL/Hr) CRRT <Continuous>  senna 2 Tablet(s) Oral at bedtime  sodium chloride 0.9%. 1000 milliLiter(s) (5 mL/Hr) IV Continuous <Continuous>  thiamine 100 milliGRAM(s) Enteral Tube daily  vancomycin  IVPB 1000 milliGRAM(s) IV Intermittent every 24 hours                            9.2    15.40 )-----------( 119      ( 14 Dec 2022 00:34 )             29.3       12-14    135  |  102  |  22  ----------------------------<  157<H>  3.8   |  22  |  1.43<H>    Ca    8.0<L>      14 Dec 2022 00:34  Phos  3.7     12-14  Mg     2.4     12-14    TPro  6.3  /  Alb  2.9<L>  /  TBili  1.0  /  DBili  x   /  AST  33  /  ALT  14  /  AlkPhos  118  12-14      PT/INR - ( 14 Dec 2022 00:34 )   PT: 12.5 sec;   INR: 1.08 ratio         PTT - ( 14 Dec 2022 05:30 )  PTT:61.0 sec    Mode: AC/ CMV (Assist Control/ Continuous Mandatory Ventilation)  RR (machine): 14  FiO2: 40  PEEP: 5  ITime: 1  MAP: 10  PC: 12  PIP: 17      Daily     Daily Weight in k.5 (14 Dec 2022 00:00)       @ 07:01  -   @ 07:00  --------------------------------------------------------  IN: 2686.5 mL / OUT: 4689 mL / NET: -2002.5 mL      Critically Ill patient  : [ ] preoperative ,   [ ] post operative  [x] Non Operative    Requires :  [x ] Arterial Line   [ x] Central Line  [ ] PA catheter  [x] IABP  [ ] ECMO  [ ] LVAD  [x ] Ventilator  [ ] pacemaker [ ] Impella.                      [x ] ABG's     [x ] Pulse Oxymetry Monitoring  Bedside evaluation , monitoring , treatment of hemodynamics , fluids , IVP/ IVCD meds.        Diagnosis:      - [facility] ECMO - arrest in SICU     ECMO Decannulation      POD - IABP placed in cath lab     MI     Acute Pancreatitis     ARDS     Hemodynamic lability,  instability. Requires IVCD [ ] vasopressors [x] inotropes  [ ] vasodilator  [x]IVSS fluid  to maintain MAP, perfusion, C.I.     Post Arrest Shock state    Hypotension     CHF- acute [ x]   chronic [ x]    systolic [ x]   diatolic [ ]          - Echo- EF -   30-40% Severe segmental LV systolic dysfunction          [ ] RV dysfunction     - Cxr-cardiomegally, edema          - Clinical-  [x ]inotropes   [ ]pressors [ ]diuresis   [x]IABP      [ ]LVAD   [x]Respiratory Failure.     Cardiogenic Shock - resolving    Renal Failure - Acute Kidney Injury     CVVHD     Thrombocytopenia     IVCD anticoagulation with [x] Heparin  [ ] Argatroban for  ECMO / MI    IABP   [x] management   [ ] wean 1:1 1:2 1:3   [ ] removal and f/u vascular checks     ISS- DM    Requires bedside physical therapy, mobilization and total [facility] care.     IVCD Precedex  for vent synchrony     Respiratory Failure     Requires chest PT, pulmonary toilet, ambu bagging, suctioning to maintain SaO2,  patent airway and treat atelectasis.     Ventilator Management:  [x ]AC-rest    [ x]CPAP-PS Wean    [ ]Trach Collar     [ ]Extubate    [ ] T-Piece  [ ]peep>5     Difficult weaning process - multiple organ system involvement in critically ill patient     Cardioverted from A Flutter at 140/min to /min    Tolerates NG / [place] feeds at [x] goal rate - currently off [ ] trophic rate    [ ]       rate                 By signing my name below, I, Rosio Alonzo, attest that this documentation has been prepared under the direction and in the presence of Finn Zelaya MD.   Electronically Signed: Brian Burnett 22 @ 07:34      Discussed with CT surgeon, Physician Assistant - Nurse Practitioner- Critical care medicine team.   Discussed at  AM / PM rounds.   Chart, labs , films reviewed.    Cumulative Critical Care Time Given Today:  CRITICAL CARE ATTENDING - CTICU    MEDICATIONS  (STANDING):  aMIOdarone    Tablet   Oral   aMIOdarone    Tablet 400 milliGRAM(s) Oral every 8 hours  atorvastatin 80 milliGRAM(s) Oral at bedtime  chlorhexidine 0.12% Liquid 15 milliLiter(s) Oral Mucosa every 12 hours  chlorhexidine 2% Cloths 1 Application(s) Topical <User Schedule>  CRRT Treatment    <Continuous>  dexMEDEtomidine Infusion 0.3 MICROgram(s)/kG/Hr (6.97 mL/Hr) IV Continuous <Continuous>  fluconAZOLE IVPB      fluconAZOLE IVPB 200 milliGRAM(s) IV Intermittent every 24 hours  heparin  Infusion 800 Unit(s)/Hr (16 mL/Hr) IV Continuous <Continuous>  insulin lispro (ADMELOG) corrective regimen sliding scale   SubCutaneous every 4 hours  meropenem  IVPB 1000 milliGRAM(s) IV Intermittent every 12 hours  metoclopramide Injectable 5 milliGRAM(s) IV Push every 8 hours  milrinone Infusion 0.1 MICROgram(s)/kG/Min (2.79 mL/Hr) IV Continuous <Continuous>  multivitamin/minerals/iron Oral Solution (CENTRUM) 15 milliLiter(s) Oral daily  pantoprazole  Injectable 40 milliGRAM(s) IV Push daily  Phoxillum Filtration BK 4 / 2.5 5000 milliLiter(s) (2200 mL/Hr) CRRT <Continuous>  Phoxillum Filtration BK 4 / 2.5 5000 milliLiter(s) (800 mL/Hr) CRRT <Continuous>  polyethylene glycol 3350 17 Gram(s) Oral two times a day  PrismaSOL Filtration BGK 4 / 2.5 5000 milliLiter(s) (200 mL/Hr) CRRT <Continuous>  senna 2 Tablet(s) Oral at bedtime  sodium chloride 0.9%. 1000 milliLiter(s) (5 mL/Hr) IV Continuous <Continuous>  thiamine 100 milliGRAM(s) Enteral Tube daily  vancomycin  IVPB 1000 milliGRAM(s) IV Intermittent every 24 hours                            9.2    15.40 )-----------( 119      ( 14 Dec 2022 00:34 )             29.3       12-14    135  |  102  |  22  ----------------------------<  157<H>  3.8   |  22  |  1.43<H>    Ca    8.0<L>      14 Dec 2022 00:34  Phos  3.7     12-14  Mg     2.4     12-14    TPro  6.3  /  Alb  2.9<L>  /  TBili  1.0  /  DBili  x   /  AST  33  /  ALT  14  /  AlkPhos  118  12-14      PT/INR - ( 14 Dec 2022 00:34 )   PT: 12.5 sec;   INR: 1.08 ratio         PTT - ( 14 Dec 2022 05:30 )  PTT:61.0 sec    Mode: AC/ CMV (Assist Control/ Continuous Mandatory Ventilation)  RR (machine): 14  FiO2: 40  PEEP: 5  ITime: 1  MAP: 10  PC: 12  PIP: 17      Daily     Daily Weight in k.5 (14 Dec 2022 00:00)       @ 07:01  -   @ 07:00  --------------------------------------------------------  IN: 2686.5 mL / OUT: 4689 mL / NET: -2002.5 mL      Critically Ill patient  : [ ] preoperative ,   [ ] post operative  [x] Non Operative    Requires :  [x ] Arterial Line   [ x] Central Line  [ ] PA catheter  [x] IABP  [ ] ECMO  [ ] LVAD  [x ] Ventilator  [ ] pacemaker [ ] Impella.                      [x ] ABG's     [x ] Pulse Oxymetry Monitoring  Bedside evaluation , monitoring , treatment of hemodynamics , fluids , IVP/ IVCD meds.        Diagnosis:      - [facility] ECMO - arrest in SICU     ECMO Decannulation      POD - IABP placed in cath lab     MI     Acute Pancreatitis     ARDS     Hemodynamic lability,  instability. Requires IVCD [ ] vasopressors [x] inotropes  [ ] vasodilator  [x]IVSS fluid  to maintain MAP, perfusion, C.I.     Post Arrest Shock state    Hypotension     CHF- acute [ x]   chronic [ x]    systolic [ x]   diatolic [ ]          - Echo- EF -   30-40% Severe segmental LV systolic dysfunction          [ ] RV dysfunction     - Cxr-cardiomegally, edema          - Clinical-  [x ]inotropes   [ ]pressors [ ]diuresis   [x]IABP      [ ]LVAD   [x]Respiratory Failure.     Cardiogenic Shock - resolving    Renal Failure - Acute Kidney Injury     CVVHD     Thrombocytopenia     IVCD anticoagulation with [x] Heparin  [ ] Argatroban for  ECMO / MI    IABP   [x] management   [ ] wean 1:1 1:2 1:3   [ ] removal and f/u vascular checks     ISS- DM    Requires bedside physical therapy, mobilization and total halfway care.     IVCD Precedex  for vent synchrony     Respiratory Failure     Requires chest PT, pulmonary toilet, ambu bagging, suctioning to maintain SaO2,  patent airway and treat atelectasis.     Ventilator Management:  [x ]AC-rest    [ x]CPAP-PS Wean    [ ]Trach Collar     [ ]Extubate    [ ] T-Piece  [ ]peep>5     Difficult weaning process - multiple organ system involvement in critically ill patient     Cardioverted from A Flutter at 140/min to /min    Tolerates NG / [place] feeds at [x] goal rate - currently off [ ] trophic rate    [ ]       rate                 By signing my name below, I, Rosio Alonzo, attest that this documentation has been prepared under the direction and in the presence of Finn Zelaya MD.   Electronically Signed: Brian Burnett 22 @ 07:34      Discussed with CT surgeon, Physician Assistant - Nurse Practitioner- Critical care medicine team.   Discussed at  AM / PM rounds.   Chart, labs , films reviewed.    Cumulative Critical Care Time Given Today:  CRITICAL CARE ATTENDING - CTICU    MEDICATIONS  (STANDING):  aMIOdarone    Tablet   Oral   aMIOdarone    Tablet 400 milliGRAM(s) Oral every 8 hours  atorvastatin 80 milliGRAM(s) Oral at bedtime  chlorhexidine 0.12% Liquid 15 milliLiter(s) Oral Mucosa every 12 hours  chlorhexidine 2% Cloths 1 Application(s) Topical <User Schedule>  CRRT Treatment    <Continuous>  dexMEDEtomidine Infusion 0.3 MICROgram(s)/kG/Hr (6.97 mL/Hr) IV Continuous <Continuous>  fluconAZOLE IVPB      fluconAZOLE IVPB 200 milliGRAM(s) IV Intermittent every 24 hours  heparin  Infusion 800 Unit(s)/Hr (16 mL/Hr) IV Continuous <Continuous>  insulin lispro (ADMELOG) corrective regimen sliding scale   SubCutaneous every 4 hours  meropenem  IVPB 1000 milliGRAM(s) IV Intermittent every 12 hours  metoclopramide Injectable 5 milliGRAM(s) IV Push every 8 hours  milrinone Infusion 0.1 MICROgram(s)/kG/Min (2.79 mL/Hr) IV Continuous <Continuous>  multivitamin/minerals/iron Oral Solution (CENTRUM) 15 milliLiter(s) Oral daily  pantoprazole  Injectable 40 milliGRAM(s) IV Push daily  Phoxillum Filtration BK 4 / 2.5 5000 milliLiter(s) (2200 mL/Hr) CRRT <Continuous>  Phoxillum Filtration BK 4 / 2.5 5000 milliLiter(s) (800 mL/Hr) CRRT <Continuous>  polyethylene glycol 3350 17 Gram(s) Oral two times a day  PrismaSOL Filtration BGK 4 / 2.5 5000 milliLiter(s) (200 mL/Hr) CRRT <Continuous>  senna 2 Tablet(s) Oral at bedtime  sodium chloride 0.9%. 1000 milliLiter(s) (5 mL/Hr) IV Continuous <Continuous>  thiamine 100 milliGRAM(s) Enteral Tube daily  vancomycin  IVPB 1000 milliGRAM(s) IV Intermittent every 24 hours                            9.2    15.40 )-----------( 119      ( 14 Dec 2022 00:34 )             29.3       12-14    135  |  102  |  22  ----------------------------<  157<H>  3.8   |  22  |  1.43<H>    Ca    8.0<L>      14 Dec 2022 00:34  Phos  3.7     12-14  Mg     2.4     12-14    TPro  6.3  /  Alb  2.9<L>  /  TBili  1.0  /  DBili  x   /  AST  33  /  ALT  14  /  AlkPhos  118  12-14      PT/INR - ( 14 Dec 2022 00:34 )   PT: 12.5 sec;   INR: 1.08 ratio         PTT - ( 14 Dec 2022 05:30 )  PTT:61.0 sec    Mode: AC/ CMV (Assist Control/ Continuous Mandatory Ventilation)  RR (machine): 14  FiO2: 40  PEEP: 5  ITime: 1  MAP: 10  PC: 12  PIP: 17      Daily     Daily Weight in k.5 (14 Dec 2022 00:00)       @ 07:01  -   @ 07:00  --------------------------------------------------------  IN: 2686.5 mL / OUT: 4689 mL / NET: -2002.5 mL      Critically Ill patient  : [ ] preoperative ,   [ ] post operative  [x] Non Operative    Requires :  [x ] Arterial Line   [ x] Central Line  [ ] PA catheter  [x] IABP  [ ] ECMO  [ ] LVAD  [x ] Ventilator  [ ] pacemaker [ ] Impella.                      [x ] ABG's     [x ] Pulse Oxymetry Monitoring  Bedside evaluation , monitoring , treatment of hemodynamics , fluids , IVP/ IVCD meds.        Diagnosis:      - [facility] ECMO - arrest in SICU     ECMO Decannulation      POD - IABP placed in cath lab     MI     Acute Pancreatitis     ARDS     Hemodynamic lability,  instability. Requires IVCD [ ] vasopressors [x] inotropes  [ ] vasodilator  [x]IVSS fluid  to maintain MAP, perfusion, C.I.     Post Arrest Shock state    Hypotension     CHF- acute [ x]   chronic [  ]    systolic [ x]   diatolic [ ]          - Echo- EF -   30-40% Severe segmental LV systolic dysfunction          [ ] RV dysfunction     - Cxr-cardiomegally, edema          - Clinical-  [x ]inotropes   [ ]pressors [ ]diuresis   [x]IABP      [ ]LVAD   [x]Respiratory Failure.     Cardiogenic Shock - resolving    Renal Failure - Acute Kidney Injury     CVVHD     Thrombocytopenia     IVCD anticoagulation with [x] Heparin  [ ] Argatroban for  ECMO / MI    IABP   [x] management   [ x] wean 1:1 1:2 1:3   [ ] removal and f/u vascular checks     ISS- DM    Requires bedside physical therapy, mobilization and total assisted care.     IVCD Precedex  for vent synchrony     Respiratory Failure     Requires chest PT, pulmonary toilet, ambu bagging, suctioning to maintain SaO2,  patent airway and treat atelectasis.     Ventilator Management:  [x ]AC-rest    [ x]CPAP-PS Wean    [ ]Trach Collar     [ ]Extubate    [ ] T-Piece  [ ]peep>5     Difficult weaning process - multiple organ system involvement in critically ill patient     Cardioverted from A Flutter at 140/min to /min    Tolerates NG / [place] feeds at [x] goal rate - currently off [ ] trophic rate    [ ]       rate     TTE  - IABP Wean                 By signing my name below, I, Rosio Alonzo, attest that this documentation has been prepared under the direction and in the presence of Finn Zelaya MD.   Electronically Signed: Brian Burnett 22 @ 07:34    I, Finn Zelaya, personally performed the services described in this documentation. All medical record entries made by the scribe were at my direction and in my presence. I have reviewed the chart and agree that the record reflects my personal performance and is accurate and complete.   Finn Zelaya MD.       Discussed with CT surgeon, Physician Assistant - Nurse Practitioner- Critical care medicine team.   Discussed at  AM / PM rounds.   Chart, labs , films reviewed.    Cumulative Critical Care Time Given Today:  90 min CRITICAL CARE ATTENDING - CTICU    MEDICATIONS  (STANDING):  aMIOdarone    Tablet   Oral   aMIOdarone    Tablet 400 milliGRAM(s) Oral every 8 hours  atorvastatin 80 milliGRAM(s) Oral at bedtime  chlorhexidine 0.12% Liquid 15 milliLiter(s) Oral Mucosa every 12 hours  chlorhexidine 2% Cloths 1 Application(s) Topical <User Schedule>  CRRT Treatment    <Continuous>  dexMEDEtomidine Infusion 0.3 MICROgram(s)/kG/Hr (6.97 mL/Hr) IV Continuous <Continuous>  fluconAZOLE IVPB      fluconAZOLE IVPB 200 milliGRAM(s) IV Intermittent every 24 hours  heparin  Infusion 800 Unit(s)/Hr (16 mL/Hr) IV Continuous <Continuous>  insulin lispro (ADMELOG) corrective regimen sliding scale   SubCutaneous every 4 hours  meropenem  IVPB 1000 milliGRAM(s) IV Intermittent every 12 hours  metoclopramide Injectable 5 milliGRAM(s) IV Push every 8 hours  milrinone Infusion 0.1 MICROgram(s)/kG/Min (2.79 mL/Hr) IV Continuous <Continuous>  multivitamin/minerals/iron Oral Solution (CENTRUM) 15 milliLiter(s) Oral daily  pantoprazole  Injectable 40 milliGRAM(s) IV Push daily  Phoxillum Filtration BK 4 / 2.5 5000 milliLiter(s) (2200 mL/Hr) CRRT <Continuous>  Phoxillum Filtration BK 4 / 2.5 5000 milliLiter(s) (800 mL/Hr) CRRT <Continuous>  polyethylene glycol 3350 17 Gram(s) Oral two times a day  PrismaSOL Filtration BGK 4 / 2.5 5000 milliLiter(s) (200 mL/Hr) CRRT <Continuous>  senna 2 Tablet(s) Oral at bedtime  sodium chloride 0.9%. 1000 milliLiter(s) (5 mL/Hr) IV Continuous <Continuous>  thiamine 100 milliGRAM(s) Enteral Tube daily  vancomycin  IVPB 1000 milliGRAM(s) IV Intermittent every 24 hours                            9.2    15.40 )-----------( 119      ( 14 Dec 2022 00:34 )             29.3       12-14    135  |  102  |  22  ----------------------------<  157<H>  3.8   |  22  |  1.43<H>    Ca    8.0<L>      14 Dec 2022 00:34  Phos  3.7     12-14  Mg     2.4     12-14    TPro  6.3  /  Alb  2.9<L>  /  TBili  1.0  /  DBili  x   /  AST  33  /  ALT  14  /  AlkPhos  118  12-14      PT/INR - ( 14 Dec 2022 00:34 )   PT: 12.5 sec;   INR: 1.08 ratio         PTT - ( 14 Dec 2022 05:30 )  PTT:61.0 sec    Mode: AC/ CMV (Assist Control/ Continuous Mandatory Ventilation)  RR (machine): 14  FiO2: 40  PEEP: 5  ITime: 1  MAP: 10  PC: 12  PIP: 17      Daily     Daily Weight in k.5 (14 Dec 2022 00:00)       @ 07:01  -   @ 07:00  --------------------------------------------------------  IN: 2686.5 mL / OUT: 4689 mL / NET: -2002.5 mL      Critically Ill patient  : [ ] preoperative ,   [ ] post operative  [x] Non Operative    Requires :  [x ] Arterial Line   [ x] Central Line  [ ] PA catheter  [x] IABP  [ ] ECMO  [ ] LVAD  [x ] Ventilator  [ ] pacemaker [ ] Impella.                      [x ] ABG's     [x ] Pulse Oxymetry Monitoring  Bedside evaluation , monitoring , treatment of hemodynamics , fluids , IVP/ IVCD meds.        Diagnosis:      - [facility] ECMO - arrest in SICU     ECMO Decannulation      POD - IABP placed in cath lab     MI     Acute Pancreatitis     ARDS     Hemodynamic lability,  instability. Requires IVCD [ ] vasopressors [x] inotropes  [ ] vasodilator  [x]IVSS fluid  to maintain MAP, perfusion, C.I.     Post Arrest Shock state    Hypotension     CHF- acute [ x]   chronic [  ]    systolic [ x]   diatolic [ ]          - Echo- EF -   30-40% Severe segmental LV systolic dysfunction          [ ] RV dysfunction     - Cxr-cardiomegally, edema          - Clinical-  [x ]inotropes   [ ]pressors [ ]diuresis   [x]IABP      [ ]LVAD   [x]Respiratory Failure.     Cardiogenic Shock - resolving    Renal Failure - Acute Kidney Injury     CVVHD     Thrombocytopenia     IVCD anticoagulation with [x] Heparin  [ ] Argatroban for  ECMO / MI    IABP   [x] management   [ x] wean 1:1 1:2 1:3   [ ] removal and f/u vascular checks     ISS- DM    Requires bedside physical therapy, mobilization and total intermediate care.     IVCD Precedex  for vent synchrony     Respiratory Failure     Requires chest PT, pulmonary toilet, ambu bagging, suctioning to maintain SaO2,  patent airway and treat atelectasis.     Ventilator Management:  [x ]AC-rest    [ x]CPAP-PS Wean    [ ]Trach Collar     [ ]Extubate    [ ] T-Piece  [ ]peep>5     Difficult weaning process - multiple organ system involvement in critically ill patient     Cardioverted from A Flutter at 140/min to /min    Tolerates NG / [place] feeds at [x] goal rate - currently off [ ] trophic rate    [ ]       rate     TTE  - IABP Wean                 By signing my name below, I, Rosio Alonzo, attest that this documentation has been prepared under the direction and in the presence of Finn Zelaya MD.   Electronically Signed: Brian Burnett 22 @ 07:34    I, Finn Zelaya, personally performed the services described in this documentation. All medical record entries made by the scribe were at my direction and in my presence. I have reviewed the chart and agree that the record reflects my personal performance and is accurate and complete.   Finn Zelaya MD.       Discussed with CT surgeon, Physician Assistant - Nurse Practitioner- Critical care medicine team.   Discussed at  AM / PM rounds.   Chart, labs , films reviewed.    Cumulative Critical Care Time Given Today:  90 min CRITICAL CARE ATTENDING - CTICU    MEDICATIONS  (STANDING):  aMIOdarone    Tablet   Oral   aMIOdarone    Tablet 400 milliGRAM(s) Oral every 8 hours  atorvastatin 80 milliGRAM(s) Oral at bedtime  chlorhexidine 0.12% Liquid 15 milliLiter(s) Oral Mucosa every 12 hours  chlorhexidine 2% Cloths 1 Application(s) Topical <User Schedule>  CRRT Treatment    <Continuous>  dexMEDEtomidine Infusion 0.3 MICROgram(s)/kG/Hr (6.97 mL/Hr) IV Continuous <Continuous>  fluconAZOLE IVPB      fluconAZOLE IVPB 200 milliGRAM(s) IV Intermittent every 24 hours  heparin  Infusion 800 Unit(s)/Hr (16 mL/Hr) IV Continuous <Continuous>  insulin lispro (ADMELOG) corrective regimen sliding scale   SubCutaneous every 4 hours  meropenem  IVPB 1000 milliGRAM(s) IV Intermittent every 12 hours  metoclopramide Injectable 5 milliGRAM(s) IV Push every 8 hours  milrinone Infusion 0.1 MICROgram(s)/kG/Min (2.79 mL/Hr) IV Continuous <Continuous>  multivitamin/minerals/iron Oral Solution (CENTRUM) 15 milliLiter(s) Oral daily  pantoprazole  Injectable 40 milliGRAM(s) IV Push daily  Phoxillum Filtration BK 4 / 2.5 5000 milliLiter(s) (2200 mL/Hr) CRRT <Continuous>  Phoxillum Filtration BK 4 / 2.5 5000 milliLiter(s) (800 mL/Hr) CRRT <Continuous>  polyethylene glycol 3350 17 Gram(s) Oral two times a day  PrismaSOL Filtration BGK 4 / 2.5 5000 milliLiter(s) (200 mL/Hr) CRRT <Continuous>  senna 2 Tablet(s) Oral at bedtime  sodium chloride 0.9%. 1000 milliLiter(s) (5 mL/Hr) IV Continuous <Continuous>  thiamine 100 milliGRAM(s) Enteral Tube daily  vancomycin  IVPB 1000 milliGRAM(s) IV Intermittent every 24 hours                            9.2    15.40 )-----------( 119      ( 14 Dec 2022 00:34 )             29.3       12-14    135  |  102  |  22  ----------------------------<  157<H>  3.8   |  22  |  1.43<H>    Ca    8.0<L>      14 Dec 2022 00:34  Phos  3.7     12-14  Mg     2.4     12-14    TPro  6.3  /  Alb  2.9<L>  /  TBili  1.0  /  DBili  x   /  AST  33  /  ALT  14  /  AlkPhos  118  12-14      PT/INR - ( 14 Dec 2022 00:34 )   PT: 12.5 sec;   INR: 1.08 ratio         PTT - ( 14 Dec 2022 05:30 )  PTT:61.0 sec    Mode: AC/ CMV (Assist Control/ Continuous Mandatory Ventilation)  RR (machine): 14  FiO2: 40  PEEP: 5  ITime: 1  MAP: 10  PC: 12  PIP: 17      Daily     Daily Weight in k.5 (14 Dec 2022 00:00)       @ 07:01  -   @ 07:00  --------------------------------------------------------  IN: 2686.5 mL / OUT: 4689 mL / NET: -2002.5 mL      Critically Ill patient  : [ ] preoperative ,   [ ] post operative  [x] Non Operative    Requires :  [x ] Arterial Line   [ x] Central Line  [ ] PA catheter  [x] IABP  [ ] ECMO  [ ] LVAD  [x ] Ventilator  [ ] pacemaker [ ] Impella.                      [x ] ABG's     [x ] Pulse Oxymetry Monitoring  Bedside evaluation , monitoring , treatment of hemodynamics , fluids , IVP/ IVCD meds.        Diagnosis:      - [facility] ECMO - arrest in SICU     ECMO Decannulation      POD - IABP placed in cath lab     MI     Acute Pancreatitis     ARDS     Hemodynamic lability,  instability. Requires IVCD [ ] vasopressors [x] inotropes  [ ] vasodilator  [x]IVSS fluid  to maintain MAP, perfusion, C.I.     Post Arrest Shock state    Hypotension     CHF- acute [ x]   chronic [  ]    systolic [ x]   diatolic [ ]          - Echo- EF -   30-40% Severe segmental LV systolic dysfunction          [ ] RV dysfunction     - Cxr-cardiomegally, edema          - Clinical-  [x ]inotropes   [ ]pressors [ ]diuresis   [x]IABP      [ ]LVAD   [x]Respiratory Failure.     Cardiogenic Shock - resolving    Renal Failure - Acute Kidney Injury     CVVHD     Thrombocytopenia     IVCD anticoagulation with [x] Heparin  [ ] Argatroban for  ECMO / MI    IABP   [x] management   [ x] wean 1:1 1:2 1:3   [ ] removal and f/u vascular checks     ISS- DM    Requires bedside physical therapy, mobilization and total detention care.     IVCD Precedex  for vent synchrony     Respiratory Failure     Requires chest PT, pulmonary toilet, ambu bagging, suctioning to maintain SaO2,  patent airway and treat atelectasis.     Ventilator Management:  [x ]AC-rest    [ x]CPAP-PS Wean    [ ]Trach Collar     [ ]Extubate    [ ] T-Piece  [ ]peep>5     Difficult weaning process - multiple organ system involvement in critically ill patient     Cardioverted from A Flutter at 140/min to /min    Tolerates NG / NJ feeds at [x] goal rate - currently off [ ] trophic rate    [ ]       rate     TTE  - IABP Wean                 By signing my name below, I, Rosio Alonzo, attest that this documentation has been prepared under the direction and in the presence of Finn Zelaya MD.   Electronically Signed: Brian Burnett 22 @ 07:34    I, Finn Zelaya, personally performed the services described in this documentation. All medical record entries made by the scribe were at my direction and in my presence. I have reviewed the chart and agree that the record reflects my personal performance and is accurate and complete.   Finn Zelaya MD.       Discussed with CT surgeon, Physician Assistant - Nurse Practitioner- Critical care medicine team.   Discussed at  AM / PM rounds.   Chart, labs , films reviewed.    Cumulative Critical Care Time Given Today:  90 min

## 2022-12-14 NOTE — PROGRESS NOTE ADULT - SUBJECTIVE AND OBJECTIVE BOX
[facility] DIVISION OF KIDNEY DISEASES AND HYPERTENSION   FOLLOW UP NOTE    --------------------------------------------------------------------------------  HPI:  61 y/o male with PMH of CABG, DM, HTN, HLD presenting as transfer from [place] for c/f STEMI. Course c/b gallstone pancreatitis leading to ARDS and shock & cardiac arrest now on [facility] ECMO. Had significant troponin elevation and his LVEF down to 8%. Pt was deemed to be too unstable to have an angiogram and potential PCI due to his co-morbidities & a new subdural bleed. Pt being seen for ERIC. SCr on arrival was 2.15; trended down to hector 1.6 on 11/25 and eventually increased to 3.95 11/28. Pt received IV diuretics as per CTU.    Patient seen & examined today. Pt is intubated, unable to obtain ROS. Pt initiated on CRRT on 12/5/22 to optimize volume status and electrolytes. Pt tolerating CRRT overnight with target net negative fluid balance. Pt underwent decannulation of ECMO on 12/8/22. Pt was restarted overnight on 12/9/22 for volume overload.  Pt tolerating CRRT.     PAST HISTORY  --------------------------------------------------------------------------------  No significant changes to PMH, PSH, FHx, SHx, unless otherwise noted    ALLERGIES & MEDICATIONS  --------------------------------------------------------------------------------  Allergies    No Known Allergies    Intolerances    Standing Inpatient Medications  aMIOdarone    Tablet   Oral   aMIOdarone    Tablet 400 milliGRAM(s) Oral every 8 hours  atorvastatin 80 milliGRAM(s) Oral at bedtime  chlorhexidine 0.12% Liquid 15 milliLiter(s) Oral Mucosa every 12 hours  chlorhexidine 2% Cloths 1 Application(s) Topical <User Schedule>  CRRT Treatment    <Continuous>  dexMEDEtomidine Infusion 0.3 MICROgram(s)/kG/Hr IV Continuous <Continuous>  fluconAZOLE IVPB      fluconAZOLE IVPB 200 milliGRAM(s) IV Intermittent every 24 hours  heparin  Infusion 800 Unit(s)/Hr IV Continuous <Continuous>  insulin lispro (ADMELOG) corrective regimen sliding scale   SubCutaneous every 4 hours  meropenem  IVPB 1000 milliGRAM(s) IV Intermittent every 12 hours  metoclopramide Injectable 5 milliGRAM(s) IV Push every 8 hours  milrinone Infusion 0.1 MICROgram(s)/kG/Min IV Continuous <Continuous>  multivitamin/minerals/iron Oral Solution (CENTRUM) 15 milliLiter(s) Oral daily  pantoprazole  Injectable 40 milliGRAM(s) IV Push daily  Phoxillum Filtration BK 4 / 2.5 5000 milliLiter(s) CRRT <Continuous>  Phoxillum Filtration BK 4 / 2.5 5000 milliLiter(s) CRRT <Continuous>  polyethylene glycol 3350 17 Gram(s) Oral two times a day  PrismaSOL Filtration BGK 4 / 2.5 5000 milliLiter(s) CRRT <Continuous>  senna 2 Tablet(s) Oral at bedtime  sodium chloride 0.9%. 1000 milliLiter(s) IV Continuous <Continuous>  thiamine 100 milliGRAM(s) Enteral Tube daily  vancomycin  IVPB 1000 milliGRAM(s) IV Intermittent every 24 hours    PRN Inpatient Medications  acetaminophen    Suspension .. 650 milliGRAM(s) Enteral Tube every 6 hours PRN  lidocaine   4% Patch 1 Patch Transdermal daily PRN      REVIEW OF SYSTEMS  --------------------------------------------------------------------------------  Unable to obtain    VITALS/PHYSICAL EXAM  --------------------------------------------------------------------------------  T(C): 37.2 (12-14-22 @ 08:00), Max: 37.2 (12-14-22 @ 00:00)  HR: 100 (12-14-22 @ 08:00) (97 - 118)  BP: --  RR: 15 (12-14-22 @ 08:00) (9 - 27)  SpO2: 100% (12-14-22 @ 08:00) (97% - 100%)  Wt(kg): --    12-13-22 @ 07:01  -  12-14-22 @ 07:00  --------------------------------------------------------  IN: 2686.5 mL / OUT: 4689 mL / NET: -2002.5 mL    12-14-22 @ 07:01  -  12-14-22 @ 08:17  --------------------------------------------------------  IN: 98.1 mL / OUT: 253 mL / NET: -154.9 mL      Physical Exam:             Gen: ill appearing male intubated   	HEENT: Anicteric  	Pulm: on CMV via ETT+  	CV: S1S2+  	Abd: Soft, +BS       	Ext: LE edema B/L++  	Neuro: awake and nods spontaneously  	Skin: Warm and dry              Dialysis acces: right non tunneled HD catheter    LABS/STUDIES  --------------------------------------------------------------------------------              9.2    15.40 >-----------<  119      [12-14-22 @ 00:34]              29.3     135  |  102  |  22  ----------------------------<  157      [12-14-22 @ 00:34]  3.8   |  22  |  1.43        Ca     8.0     [12-14-22 @ 00:34]      Mg     2.4     [12-14-22 @ 00:34]      Phos  3.7     [12-14-22 @ 00:34]    Creatinine Trend:  SCr 1.43 [12-14 @ 00:34]  SCr 1.38 [12-13 @ 01:08]  SCr 1.55 [12-12 @ 00:41]  SCr 1.84 [12-11 @ 00:26]  SCr 2.19 [12-10 @ 00:47]    Urinalysis - [12-10-22 @ 20:01]      Color Brown / Appearance Slightly Turbid / SG 1.020 / pH 5.0      Gluc 100 mg/dL / Ketone Trace  / Bili Moderate / Urobili Negative       Blood Large / Protein 300 mg/dL / Leuk Est Large / Nitrite Positive      RBC >50 / WBC >50 / Hyaline  / Gran 10 / Sq Epi  / Non Sq Epi 3 / Bacteria Moderate [facility] DIVISION OF KIDNEY DISEASES AND HYPERTENSION   FOLLOW UP NOTE    --------------------------------------------------------------------------------  HPI:  61 y/o male with PMH of CABG, DM, HTN, HLD presenting as transfer from [place] for c/f STEMI. Course c/b gallstone pancreatitis leading to ARDS and shock & cardiac arrest now on [facility] ECMO. Had significant troponin elevation and his LVEF down to 8%. Pt was deemed to be too unstable to have an angiogram and potential PCI due to his co-morbidities & a new subdural bleed. Pt being seen for ERIC. SCr on arrival was 2.15; trended down to hector 1.6 on 11/25 and eventually increased to 3.95 11/28. Pt received IV diuretics as per CTU.    Patient seen & examined today. Pt is intubated, unable to obtain ROS. Pt initiated on CRRT on 12/5/22 to optimize volume status and electrolytes. Pt tolerating CRRT overnight with target net negative fluid balance. Pt underwent decannulation of ECMO on 12/8/22. Pt was restarted overnight on 12/9/22 for volume overload.  Pt tolerating CRRT.     PAST HISTORY  --------------------------------------------------------------------------------  No significant changes to PMH, PSH, FHx, SHx, unless otherwise noted    ALLERGIES & MEDICATIONS  --------------------------------------------------------------------------------  Allergies    No Known Allergies    Intolerances    Standing Inpatient Medications  aMIOdarone    Tablet   Oral   aMIOdarone    Tablet 400 milliGRAM(s) Oral every 8 hours  atorvastatin 80 milliGRAM(s) Oral at bedtime  chlorhexidine 0.12% Liquid 15 milliLiter(s) Oral Mucosa every 12 hours  chlorhexidine 2% Cloths 1 Application(s) Topical <User Schedule>  CRRT Treatment    <Continuous>  dexMEDEtomidine Infusion 0.3 MICROgram(s)/kG/Hr IV Continuous <Continuous>  fluconAZOLE IVPB      fluconAZOLE IVPB 200 milliGRAM(s) IV Intermittent every 24 hours  heparin  Infusion 800 Unit(s)/Hr IV Continuous <Continuous>  insulin lispro (ADMELOG) corrective regimen sliding scale   SubCutaneous every 4 hours  meropenem  IVPB 1000 milliGRAM(s) IV Intermittent every 12 hours  metoclopramide Injectable 5 milliGRAM(s) IV Push every 8 hours  milrinone Infusion 0.1 MICROgram(s)/kG/Min IV Continuous <Continuous>  multivitamin/minerals/iron Oral Solution (CENTRUM) 15 milliLiter(s) Oral daily  pantoprazole  Injectable 40 milliGRAM(s) IV Push daily  Phoxillum Filtration BK 4 / 2.5 5000 milliLiter(s) CRRT <Continuous>  Phoxillum Filtration BK 4 / 2.5 5000 milliLiter(s) CRRT <Continuous>  polyethylene glycol 3350 17 Gram(s) Oral two times a day  PrismaSOL Filtration BGK 4 / 2.5 5000 milliLiter(s) CRRT <Continuous>  senna 2 Tablet(s) Oral at bedtime  sodium chloride 0.9%. 1000 milliLiter(s) IV Continuous <Continuous>  thiamine 100 milliGRAM(s) Enteral Tube daily  vancomycin  IVPB 1000 milliGRAM(s) IV Intermittent every 24 hours    PRN Inpatient Medications  acetaminophen    Suspension .. 650 milliGRAM(s) Enteral Tube every 6 hours PRN  lidocaine   4% Patch 1 Patch Transdermal daily PRN      REVIEW OF SYSTEMS  --------------------------------------------------------------------------------  Unable to obtain    VITALS/PHYSICAL EXAM  --------------------------------------------------------------------------------  T(C): 37.2 (12-14-22 @ 08:00), Max: 37.2 (12-14-22 @ 00:00)  HR: 100 (12-14-22 @ 08:00) (97 - 118)  BP: --  RR: 15 (12-14-22 @ 08:00) (9 - 27)  SpO2: 100% (12-14-22 @ 08:00) (97% - 100%)  Wt(kg): --    12-13-22 @ 07:01  -  12-14-22 @ 07:00  --------------------------------------------------------  IN: 2686.5 mL / OUT: 4689 mL / NET: -2002.5 mL    12-14-22 @ 07:01  -  12-14-22 @ 08:17  --------------------------------------------------------  IN: 98.1 mL / OUT: 253 mL / NET: -154.9 mL      Physical Exam:             Gen: ill appearing male intubated   	HEENT: Anicteric  	Pulm: on CMV via ETT+  	CV: S1S2+  	Abd: Soft, +BS       	Ext: LE edema B/L++  	Neuro: awake and nods spontaneously  	Skin: Warm and dry              Dialysis acces: right non tunneled HD catheter    LABS/STUDIES  --------------------------------------------------------------------------------              9.2    15.40 >-----------<  119      [12-14-22 @ 00:34]              29.3     135  |  102  |  22  ----------------------------<  157      [12-14-22 @ 00:34]  3.8   |  22  |  1.43        Ca     8.0     [12-14-22 @ 00:34]      Mg     2.4     [12-14-22 @ 00:34]      Phos  3.7     [12-14-22 @ 00:34]    Creatinine Trend:  SCr 1.43 [12-14 @ 00:34]  SCr 1.38 [12-13 @ 01:08]  SCr 1.55 [12-12 @ 00:41]  SCr 1.84 [12-11 @ 00:26]  SCr 2.19 [12-10 @ 00:47]    Urinalysis - [12-10-22 @ 20:01]      Color Brown / Appearance Slightly Turbid / SG 1.020 / pH 5.0      Gluc 100 mg/dL / Ketone Trace  / Bili Moderate / Urobili Negative       Blood Large / Protein 300 mg/dL / Leuk Est Large / Nitrite Positive      RBC >50 / WBC >50 / Hyaline  / Gran 10 / Sq Epi  / Non Sq Epi 3 / Bacteria Moderate [facility] DIVISION OF KIDNEY DISEASES AND HYPERTENSION   FOLLOW UP NOTE    --------------------------------------------------------------------------------  HPI:  63 y/o male with PMH of CABG, DM, HTN, HLD presenting as transfer from [place] for c/f STEMI. Course c/b gallstone pancreatitis leading to ARDS and shock & cardiac arrest now on [facility] ECMO. Had significant troponin elevation and his LVEF down to 8%. Pt was deemed to be too unstable to have an angiogram and potential PCI due to his co-morbidities & a new subdural bleed. Pt being seen for ERIC. SCr on arrival was 2.15; trended down to hector 1.6 on 11/25 and eventually increased to 3.95 11/28. Pt received IV diuretics as per CTU.    Patient seen & examined today. Pt is intubated, unable to obtain ROS. Pt initiated on CRRT on 12/5/22 to optimize volume status and electrolytes. Pt tolerating CRRT overnight with target net negative fluid balance. Pt underwent decannulation of ECMO on 12/8/22. Pt was restarted overnight on 12/9/22 for volume overload.  Pt tolerating CRRT.     PAST HISTORY  --------------------------------------------------------------------------------  No significant changes to PMH, PSH, FHx, SHx, unless otherwise noted    ALLERGIES & MEDICATIONS  --------------------------------------------------------------------------------  Allergies    No Known Allergies    Intolerances    Standing Inpatient Medications  aMIOdarone    Tablet   Oral   aMIOdarone    Tablet 400 milliGRAM(s) Oral every 8 hours  atorvastatin 80 milliGRAM(s) Oral at bedtime  chlorhexidine 0.12% Liquid 15 milliLiter(s) Oral Mucosa every 12 hours  chlorhexidine 2% Cloths 1 Application(s) Topical <User Schedule>  CRRT Treatment    <Continuous>  dexMEDEtomidine Infusion 0.3 MICROgram(s)/kG/Hr IV Continuous <Continuous>  fluconAZOLE IVPB      fluconAZOLE IVPB 200 milliGRAM(s) IV Intermittent every 24 hours  heparin  Infusion 800 Unit(s)/Hr IV Continuous <Continuous>  insulin lispro (ADMELOG) corrective regimen sliding scale   SubCutaneous every 4 hours  meropenem  IVPB 1000 milliGRAM(s) IV Intermittent every 12 hours  metoclopramide Injectable 5 milliGRAM(s) IV Push every 8 hours  milrinone Infusion 0.1 MICROgram(s)/kG/Min IV Continuous <Continuous>  multivitamin/minerals/iron Oral Solution (CENTRUM) 15 milliLiter(s) Oral daily  pantoprazole  Injectable 40 milliGRAM(s) IV Push daily  Phoxillum Filtration BK 4 / 2.5 5000 milliLiter(s) CRRT <Continuous>  Phoxillum Filtration BK 4 / 2.5 5000 milliLiter(s) CRRT <Continuous>  polyethylene glycol 3350 17 Gram(s) Oral two times a day  PrismaSOL Filtration BGK 4 / 2.5 5000 milliLiter(s) CRRT <Continuous>  senna 2 Tablet(s) Oral at bedtime  sodium chloride 0.9%. 1000 milliLiter(s) IV Continuous <Continuous>  thiamine 100 milliGRAM(s) Enteral Tube daily  vancomycin  IVPB 1000 milliGRAM(s) IV Intermittent every 24 hours    PRN Inpatient Medications  acetaminophen    Suspension .. 650 milliGRAM(s) Enteral Tube every 6 hours PRN  lidocaine   4% Patch 1 Patch Transdermal daily PRN      REVIEW OF SYSTEMS  --------------------------------------------------------------------------------  Unable to obtain    VITALS/PHYSICAL EXAM  --------------------------------------------------------------------------------  T(C): 37.2 (12-14-22 @ 08:00), Max: 37.2 (12-14-22 @ 00:00)  HR: 100 (12-14-22 @ 08:00) (97 - 118)  BP: --  RR: 15 (12-14-22 @ 08:00) (9 - 27)  SpO2: 100% (12-14-22 @ 08:00) (97% - 100%)  Wt(kg): --    12-13-22 @ 07:01  -  12-14-22 @ 07:00  --------------------------------------------------------  IN: 2686.5 mL / OUT: 4689 mL / NET: -2002.5 mL    12-14-22 @ 07:01  -  12-14-22 @ 08:17  --------------------------------------------------------  IN: 98.1 mL / OUT: 253 mL / NET: -154.9 mL      Physical Exam:             Gen: ill appearing male intubated   	HEENT: Anicteric  	Pulm: on CMV via ETT+  	CV: S1S2+  	Abd: Soft, +BS       	Ext: LE edema B/L++  	Neuro: awake and nods spontaneously  	Skin: Warm and dry              Dialysis acces: right non tunneled HD catheter    LABS/STUDIES  --------------------------------------------------------------------------------              9.2    15.40 >-----------<  119      [12-14-22 @ 00:34]              29.3     135  |  102  |  22  ----------------------------<  157      [12-14-22 @ 00:34]  3.8   |  22  |  1.43        Ca     8.0     [12-14-22 @ 00:34]      Mg     2.4     [12-14-22 @ 00:34]      Phos  3.7     [12-14-22 @ 00:34]    Creatinine Trend:  SCr 1.43 [12-14 @ 00:34]  SCr 1.38 [12-13 @ 01:08]  SCr 1.55 [12-12 @ 00:41]  SCr 1.84 [12-11 @ 00:26]  SCr 2.19 [12-10 @ 00:47]    Urinalysis - [12-10-22 @ 20:01]      Color Brown / Appearance Slightly Turbid / SG 1.020 / pH 5.0      Gluc 100 mg/dL / Ketone Trace  / Bili Moderate / Urobili Negative       Blood Large / Protein 300 mg/dL / Leuk Est Large / Nitrite Positive      RBC >50 / WBC >50 / Hyaline  / Gran 10 / Sq Epi  / Non Sq Epi 3 / Bacteria Moderate

## 2022-12-14 NOTE — PROGRESS NOTE ADULT - ASSESSMENT
Assessment:  Patient EDUARDO REAL is a 62y (1960) man with a hx of CAD s/p 4v CABG ~2019 in [facility], DMII A1c 7.3%, HTN, HLD, who originally presented as a transfer from [facility] with concern for STEMI, had presented to [facility] with abdominal pain, n/v, was found to have pancreatitis and elevated troponins. Neurology was consulted for altered mental status on 12/12/22.    Impression: Resolved encephalopathy with unclear etiology. Ddx is broad and includes cerebrovascular (watershed area of ischemia, although only unilateral), toxic/metabolic, iatrogenic (was on cefepime), or even seizure. No clear focal neurologic deficits on exam.  EEG reading: No epileptiform discharges or seizures. CT head shows similar increased in density consistent with minimal residual subdural hemorrhage.       Recommendation:   - CT head 12/13: result above  - vEEG with mild generalized slowing but no evidence for focal slowing, epileptiform discharges, or seizures. STOPPED  - No AED's at this time  - Initially suggested MRI brain w/o if able (given pt is still on IABP). However, patient's mental status has improved and back to baseline. Can consider repeat CT head non con if any new acute changes in mental status.   - Rest of the workup per primary care team  - No further inpatient neurology recommendation, will sign off, call consult team [phone] if any questions.    Plans discussed with neurology attending, Dr. Daniel Assessment:  Patient EDUARDO REAL is a 62y (1960) man with a hx of CAD s/p 4v CABG ~2019 in [facility], DMII A1c 7.3%, HTN, HLD, who originally presented as a transfer from [facility] with concern for STEMI, had presented to [facility] with abdominal pain, n/v, was found to have pancreatitis and elevated troponins. Neurology was consulted for altered mental status on 12/12/22.    Impression: Resolved encephalopathy with unclear etiology. Ddx is broad and includes cerebrovascular (watershed area of ischemia, although only unilateral), toxic/metabolic, iatrogenic (was on cefepime), or even seizure. No clear focal neurologic deficits on exam.  EEG reading: No epileptiform discharges or seizures. CT head shows similar increased in density consistent with minimal residual subdural hemorrhage.       Recommendation:   - CT head 12/13: result above  - vEEG with mild generalized slowing but no evidence for focal slowing, epileptiform discharges, or seizures. STOPPED  - No AED's at this time  - Initially suggested MRI brain w/o if able (given pt is still on IABP). However, patient's mental status has improved and back to baseline. Can consider repeat CT head non con if any new acute changes in mental status.   - Rest of the workup per primary care team  - No further inpatient neurology recommendation, will sign off, call consult team [phone] if any questions.    Plans discussed with neurology attending, Dr. Daniel Assessment:  Patient EDUARDO REAL is a 62y (1960) man with a hx of CAD s/p 4v CABG ~2019 in [facility], DMII A1c 7.3%, HTN, HLD, who originally presented as a transfer from [facility] with concern for STEMI, had presented to [facility] with abdominal pain, n/v, was found to have pancreatitis and elevated troponins. Neurology was consulted for altered mental status on 12/12/22.    Impression: Resolved encephalopathy with unclear etiology. Ddx is broad and includes cerebrovascular (watershed area of ischemia, although only unilateral), toxic/metabolic, iatrogenic (was on cefepime), or even seizure. No clear focal neurologic deficits on exam.  EEG reading: No epileptiform discharges or seizures. CT head shows similar increased in density consistent with minimal residual subdural hemorrhage.       Recommendation:   - CT head 12/13: result above  - vEEG with mild generalized slowing but no evidence for focal slowing, epileptiform discharges, or seizures. STOPPED  - No AED's at this time  - Initially suggested MRI brain w/o if able (given pt is still on IABP). However, patient's mental status has improved and back to baseline. Can consider repeat CT head non con if any new acute changes in mental status.   - Rest of the workup per primary care team  - No further inpatient neurology recommendation, will sign off, call consult team [phone] if any questions.    Plans discussed with neurology attending, Dr. Daniel Assessment:  Patient EDUARDO REAL is a 62y (1960) man with a hx of CAD s/p 4v CABG ~2019 in [facility], DMII A1c 7.3%, HTN, HLD, who originally presented as a transfer from [facility] with concern for STEMI, had presented to [facility] with abdominal pain, n/v, was found to have pancreatitis and elevated troponins. Neurology was consulted for altered mental status on 12/12/22.    Impression: Resolved encephalopathy with unclear etiology. Ddx is broad and includes cerebrovascular (watershed area of ischemia, although only unilateral), toxic/metabolic, iatrogenic (was on cefepime), or even seizure. No clear focal neurologic deficits on exam.  EEG reading: No epileptiform discharges or seizures. CT head 12/13 shows similar increased in density consistent with minimal residual subdural hemorrhage. No parenchymal hemorrhage or brain edema.      Recommendation:   - CT head 12/13: result above  - vEEG with mild generalized slowing but no evidence for focal slowing, epileptiform discharges, or seizures.   - No AED's at this time  - Initially suggested MRI brain w/o if able (given pt is still on IABP). However, patient's mental status has improved and back to baseline. Can consider repeat CT head non con if any new acute changes in mental status.   - Rest of the workup per primary care team  - No further inpatient neurology recommendation, will sign off, call consult team [phone] if any questions.    Plans discussed with neurology attending, Dr. Daniel Assessment:  Patient EDUARDO REAL is a 62y (1960) man with a hx of CAD s/p 4v CABG ~2019 in [facility], DMII A1c 7.3%, HTN, HLD, who originally presented as a transfer from [facility] with concern for STEMI, had presented to [facility] with abdominal pain, n/v, was found to have pancreatitis and elevated troponins. Neurology was consulted for altered mental status on 12/12/22.    Impression: Resolved encephalopathy with unclear etiology. Ddx is broad and includes cerebrovascular (watershed area of ischemia, although only unilateral), toxic/metabolic, iatrogenic (was on cefepime), or even seizure. No clear focal neurologic deficits on exam.  EEG reading: No epileptiform discharges or seizures. CT head 12/13 shows similar increased in density consistent with minimal residual subdural hemorrhage. No parenchymal hemorrhage or brain edema.      Recommendation:   - CT head 12/13: result above  - vEEG with mild generalized slowing but no evidence for focal slowing, epileptiform discharges, or seizures.   - No AED's at this time  - Initially suggested MRI brain w/o if able (given pt is still on IABP). However, patient's mental status has improved and back to baseline. Can consider repeat CT head non con if any new acute changes in mental status.   - Rest of the workup per primary care team  - No further inpatient neurology recommendation, will sign off, call consult team [phone] if any questions.    Plans discussed with neurology attending, Dr. Daniel Assessment:  Patient EDUARDO REAL is a 62y (1960) man with a hx of CAD s/p 4v CABG ~2019 in [facility], DMII A1c 7.3%, HTN, HLD, who originally presented as a transfer from [facility] with concern for STEMI, had presented to [facility] with abdominal pain, n/v, was found to have pancreatitis and elevated troponins. Neurology was consulted for altered mental status on 12/12/22.    Impression: Resolved encephalopathy with unclear etiology. Ddx is broad and includes cerebrovascular (watershed area of ischemia, although only unilateral), toxic/metabolic, iatrogenic (was on cefepime), or even seizure. No clear focal neurologic deficits on exam.  EEG reading: No epileptiform discharges or seizures. CT head 12/13 shows similar increased in density consistent with minimal residual subdural hemorrhage. No parenchymal hemorrhage or brain edema.      Recommendation:   - CT head 12/13: result above  - vEEG with mild generalized slowing but no evidence for focal slowing, epileptiform discharges, or seizures.   - No AED's at this time  - Initially suggested MRI brain w/o if able (given pt is still on IABP). However, patient's mental status has improved and back to baseline. Can consider repeat CT head non con if any new acute changes in mental status.   - Rest of the workup per primary care team  - No further inpatient neurology recommendation, will sign off, call consult team [phone] if any questions.    Plans discussed with neurology attending, Dr. Daniel

## 2022-12-15 ENCOUNTER — TRANSCRIPTION ENCOUNTER (OUTPATIENT)
Age: 62
End: 2022-12-15

## 2022-12-15 DIAGNOSIS — I34.0 NONRHEUMATIC MITRAL (VALVE) INSUFFICIENCY: ICD-10-CM

## 2022-12-15 LAB
ALBUMIN SERPL ELPH-MCNC: 2.9 G/DL — LOW (ref 3.3–5)
ALP SERPL-CCNC: 116 U/L — SIGNIFICANT CHANGE UP (ref 40–120)
ALT FLD-CCNC: 16 U/L — SIGNIFICANT CHANGE UP (ref 10–45)
AMYLASE P1 CFR SERPL: 325 U/L — HIGH (ref 25–125)
ANION GAP SERPL CALC-SCNC: 12 MMOL/L — SIGNIFICANT CHANGE UP (ref 5–17)
APTT BLD: 50.5 SEC — HIGH (ref 27.5–35.5)
APTT BLD: 50.6 SEC — HIGH (ref 27.5–35.5)
APTT BLD: 52.9 SEC — HIGH (ref 27.5–35.5)
APTT BLD: 87.5 SEC — HIGH (ref 27.5–35.5)
APTT BLD: 92.7 SEC — HIGH (ref 27.5–35.5)
APTT BLD: 97.6 SEC — HIGH (ref 27.5–35.5)
AST SERPL-CCNC: 36 U/L — SIGNIFICANT CHANGE UP (ref 10–40)
BASE EXCESS BLDV CALC-SCNC: -2.7 MMOL/L — LOW (ref -2–3)
BILIRUB SERPL-MCNC: 1 MG/DL — SIGNIFICANT CHANGE UP (ref 0.2–1.2)
BUN SERPL-MCNC: 23 MG/DL — SIGNIFICANT CHANGE UP (ref 7–23)
CALCIUM SERPL-MCNC: 7.6 MG/DL — LOW (ref 8.4–10.5)
CHLORIDE SERPL-SCNC: 102 MMOL/L — SIGNIFICANT CHANGE UP (ref 96–108)
CO2 BLDV-SCNC: 24 MMOL/L — SIGNIFICANT CHANGE UP (ref 22–26)
CO2 SERPL-SCNC: 23 MMOL/L — SIGNIFICANT CHANGE UP (ref 22–31)
CREAT SERPL-MCNC: 1.63 MG/DL — HIGH (ref 0.5–1.3)
CULTURE RESULTS: SIGNIFICANT CHANGE UP
EGFR: 47 ML/MIN/1.73M2 — LOW
FIBRINOGEN PPP-MCNC: 413 MG/DL — SIGNIFICANT CHANGE UP (ref 200–445)
GAS PNL BLDA: SIGNIFICANT CHANGE UP
GAS PNL BLDV: SIGNIFICANT CHANGE UP
GLUCOSE BLDC GLUCOMTR-MCNC: 121 MG/DL — HIGH (ref 70–99)
GLUCOSE BLDC GLUCOMTR-MCNC: 141 MG/DL — HIGH (ref 70–99)
GLUCOSE BLDC GLUCOMTR-MCNC: 149 MG/DL — HIGH (ref 70–99)
GLUCOSE BLDC GLUCOMTR-MCNC: 158 MG/DL — HIGH (ref 70–99)
GLUCOSE BLDC GLUCOMTR-MCNC: 166 MG/DL — HIGH (ref 70–99)
GLUCOSE SERPL-MCNC: 174 MG/DL — HIGH (ref 70–99)
HAPTOGLOB SERPL-MCNC: <20 MG/DL — LOW (ref 34–200)
HCO3 BLDV-SCNC: 23 MMOL/L — SIGNIFICANT CHANGE UP (ref 22–29)
HCT VFR BLD CALC: 28.7 % — LOW (ref 39–50)
HGB BLD-MCNC: 8.8 G/DL — LOW (ref 13–17)
HOROWITZ INDEX BLDV+IHG-RTO: 40 — SIGNIFICANT CHANGE UP
INR BLD: 1.1 RATIO — SIGNIFICANT CHANGE UP (ref 0.88–1.16)
LDH SERPL L TO P-CCNC: 62 U/L — SIGNIFICANT CHANGE UP (ref 50–242)
LIDOCAIN IGE QN: 1004 U/L — HIGH (ref 7–60)
MAGNESIUM SERPL-MCNC: 2.6 MG/DL — SIGNIFICANT CHANGE UP (ref 1.6–2.6)
MCHC RBC-ENTMCNC: 28.9 PG — SIGNIFICANT CHANGE UP (ref 27–34)
MCHC RBC-ENTMCNC: 30.7 GM/DL — LOW (ref 32–36)
MCV RBC AUTO: 94.4 FL — SIGNIFICANT CHANGE UP (ref 80–100)
NRBC # BLD: 0 /100 WBCS — SIGNIFICANT CHANGE UP (ref 0–0)
PCO2 BLDV: 40 MMHG — LOW (ref 42–55)
PH BLDV: 7.36 — SIGNIFICANT CHANGE UP (ref 7.32–7.43)
PHOSPHATE SERPL-MCNC: 4 MG/DL — SIGNIFICANT CHANGE UP (ref 2.5–4.5)
PLATELET # BLD AUTO: 107 K/UL — LOW (ref 150–400)
PO2 BLDV: 45 MMHG — SIGNIFICANT CHANGE UP (ref 25–45)
POTASSIUM SERPL-MCNC: 4.3 MMOL/L — SIGNIFICANT CHANGE UP (ref 3.5–5.3)
POTASSIUM SERPL-SCNC: 4.3 MMOL/L — SIGNIFICANT CHANGE UP (ref 3.5–5.3)
PROCALCITONIN SERPL-MCNC: 0.94 NG/ML — HIGH (ref 0.02–0.1)
PROT SERPL-MCNC: 6.4 G/DL — SIGNIFICANT CHANGE UP (ref 6–8.3)
PROTHROM AB SERPL-ACNC: 12.7 SEC — SIGNIFICANT CHANGE UP (ref 10.5–13.4)
RBC # BLD: 3.04 M/UL — LOW (ref 4.2–5.8)
RBC # FLD: 18.1 % — HIGH (ref 10.3–14.5)
SAO2 % BLDV: 73.7 % — SIGNIFICANT CHANGE UP (ref 67–88)
SODIUM SERPL-SCNC: 137 MMOL/L — SIGNIFICANT CHANGE UP (ref 135–145)
SPECIMEN SOURCE: SIGNIFICANT CHANGE UP
VANCOMYCIN TROUGH SERPL-MCNC: 7.6 UG/ML — LOW (ref 10–20)
WBC # BLD: 15.87 K/UL — HIGH (ref 3.8–10.5)
WBC # FLD AUTO: 15.87 K/UL — HIGH (ref 3.8–10.5)

## 2022-12-15 PROCEDURE — 71045 X-RAY EXAM CHEST 1 VIEW: CPT | Mod: 26

## 2022-12-15 PROCEDURE — 99233 SBSQ HOSP IP/OBS HIGH 50: CPT | Mod: GC

## 2022-12-15 PROCEDURE — 99292 CRITICAL CARE ADDL 30 MIN: CPT

## 2022-12-15 PROCEDURE — 99291 CRITICAL CARE FIRST HOUR: CPT

## 2022-12-15 PROCEDURE — 99232 SBSQ HOSP IP/OBS MODERATE 35: CPT

## 2022-12-15 RX ORDER — CALCIUM GLUCONATE 100 MG/ML
2 VIAL (ML) INTRAVENOUS ONCE
Refills: 0 | Status: COMPLETED | OUTPATIENT
Start: 2022-12-15 | End: 2022-12-15

## 2022-12-15 RX ORDER — CALCIUM GLUCONATE 100 MG/ML
1 VIAL (ML) INTRAVENOUS ONCE
Refills: 0 | Status: COMPLETED | OUTPATIENT
Start: 2022-12-15 | End: 2022-12-15

## 2022-12-15 RX ORDER — HEPARIN SODIUM 5000 [USP'U]/ML
300 INJECTION INTRAVENOUS; SUBCUTANEOUS
Qty: 10000 | Refills: 0 | Status: DISCONTINUED | OUTPATIENT
Start: 2022-12-15 | End: 2022-12-16

## 2022-12-15 RX ORDER — VANCOMYCIN HCL 1 G
500 VIAL (EA) INTRAVENOUS EVERY 24 HOURS
Refills: 0 | Status: DISCONTINUED | OUTPATIENT
Start: 2022-12-15 | End: 2022-12-16

## 2022-12-15 RX ADMIN — CHLORHEXIDINE GLUCONATE 15 MILLILITER(S): 213 SOLUTION TOPICAL at 05:26

## 2022-12-15 RX ADMIN — Medication 200 GRAM(S): at 01:24

## 2022-12-15 RX ADMIN — MILRINONE LACTATE 2.79 MICROGRAM(S)/KG/MIN: 1 INJECTION, SOLUTION INTRAVENOUS at 05:23

## 2022-12-15 RX ADMIN — DEXMEDETOMIDINE HYDROCHLORIDE IN 0.9% SODIUM CHLORIDE 6.97 MICROGRAM(S)/KG/HR: 4 INJECTION INTRAVENOUS at 21:43

## 2022-12-15 RX ADMIN — Medication 3 MILLILITER(S): at 23:03

## 2022-12-15 RX ADMIN — MEROPENEM 100 MILLIGRAM(S): 1 INJECTION INTRAVENOUS at 06:32

## 2022-12-15 RX ADMIN — Medication 1: at 17:54

## 2022-12-15 RX ADMIN — Medication 3 MILLILITER(S): at 17:29

## 2022-12-15 RX ADMIN — CHLORHEXIDINE GLUCONATE 15 MILLILITER(S): 213 SOLUTION TOPICAL at 17:20

## 2022-12-15 RX ADMIN — Medication 100 GRAM(S): at 12:43

## 2022-12-15 RX ADMIN — Medication 1: at 14:03

## 2022-12-15 RX ADMIN — VASOPRESSIN 3 UNIT(S)/MIN: 20 INJECTION INTRAVENOUS at 05:23

## 2022-12-15 RX ADMIN — FLUCONAZOLE 100 MILLIGRAM(S): 150 TABLET ORAL at 05:39

## 2022-12-15 RX ADMIN — ATORVASTATIN CALCIUM 80 MILLIGRAM(S): 80 TABLET, FILM COATED ORAL at 21:45

## 2022-12-15 RX ADMIN — MILRINONE LACTATE 2.79 MICROGRAM(S)/KG/MIN: 1 INJECTION, SOLUTION INTRAVENOUS at 21:43

## 2022-12-15 RX ADMIN — Medication 15 MILLILITER(S): at 11:57

## 2022-12-15 RX ADMIN — AMIODARONE HYDROCHLORIDE 200 MILLIGRAM(S): 400 TABLET ORAL at 05:28

## 2022-12-15 RX ADMIN — PANTOPRAZOLE SODIUM 40 MILLIGRAM(S): 20 TABLET, DELAYED RELEASE ORAL at 11:57

## 2022-12-15 RX ADMIN — Medication 1: at 05:28

## 2022-12-15 RX ADMIN — Medication 5 MILLIGRAM(S): at 13:46

## 2022-12-15 RX ADMIN — MEROPENEM 100 MILLIGRAM(S): 1 INJECTION INTRAVENOUS at 17:20

## 2022-12-15 RX ADMIN — Medication 100 MILLIGRAM(S): at 11:57

## 2022-12-15 RX ADMIN — VASOPRESSIN 3 UNIT(S)/MIN: 20 INJECTION INTRAVENOUS at 21:43

## 2022-12-15 RX ADMIN — HEPARIN SODIUM 9.5 UNIT(S)/HR: 5000 INJECTION INTRAVENOUS; SUBCUTANEOUS at 21:44

## 2022-12-15 RX ADMIN — Medication 5 MILLIGRAM(S): at 05:27

## 2022-12-15 RX ADMIN — Medication 5 MILLIGRAM(S): at 21:45

## 2022-12-15 RX ADMIN — POLYETHYLENE GLYCOL 3350 17 GRAM(S): 17 POWDER, FOR SOLUTION ORAL at 05:28

## 2022-12-15 RX ADMIN — DEXMEDETOMIDINE HYDROCHLORIDE IN 0.9% SODIUM CHLORIDE 6.97 MICROGRAM(S)/KG/HR: 4 INJECTION INTRAVENOUS at 05:21

## 2022-12-15 RX ADMIN — HEPARIN SODIUM 0.6 UNIT(S)/HR: 5000 INJECTION INTRAVENOUS; SUBCUTANEOUS at 05:23

## 2022-12-15 RX ADMIN — DEXMEDETOMIDINE HYDROCHLORIDE IN 0.9% SODIUM CHLORIDE 6.97 MICROGRAM(S)/KG/HR: 4 INJECTION INTRAVENOUS at 14:00

## 2022-12-15 RX ADMIN — Medication 100 MILLIGRAM(S): at 16:18

## 2022-12-15 RX ADMIN — CHLORHEXIDINE GLUCONATE 1 APPLICATION(S): 213 SOLUTION TOPICAL at 05:26

## 2022-12-15 RX ADMIN — HEPARIN SODIUM 12 UNIT(S)/HR: 5000 INJECTION INTRAVENOUS; SUBCUTANEOUS at 06:32

## 2022-12-15 RX ADMIN — MILRINONE LACTATE 2.79 MICROGRAM(S)/KG/MIN: 1 INJECTION, SOLUTION INTRAVENOUS at 14:01

## 2022-12-15 RX ADMIN — Medication 3 MILLILITER(S): at 05:26

## 2022-12-15 RX ADMIN — Medication 3 MILLILITER(S): at 11:14

## 2022-12-15 RX ADMIN — HEPARIN SODIUM 14 UNIT(S)/HR: 5000 INJECTION INTRAVENOUS; SUBCUTANEOUS at 05:22

## 2022-12-15 NOTE — PRE-ANESTHESIA EVALUATION ADULT - NSANTHPMHFT_GEN_ALL_CORE
63 yo M w/ PMHx of CAD s/p CABG, DM, HTN, HLD, GERD presenting as transfer from [place] for c/f STEMI in setting of acute cholecystitis and gallstone pancreatitis. Acute hypoxemic respiratory failure thought to be secondary to ARDS vs cardiogenic shock s/p intubation. Worsening hemodynamics due to cardiogenic shock s/p VA ECMO with subsequent decannulation on 12/8, now with IABP in place and inotropic support. Of ntoe, pt with ERIC on CVVH for volume overload, empiric abx for presumed infection of unknown source, and small SDH noted w/ likely resolution on follow-up scans. Now for mitral MONROE to treat his severe MR. Tentatively planned for tracheostomy later today. 63 yo M w/ PMHx of CAD s/p CABG, DM, HTN, HLD, GERD presenting as transfer from [place] for c/f STEMI in setting of acute cholecystitis and gallstone pancreatitis. Acute hypoxemic respiratory failure thought to be secondary to ARDS vs cardiogenic shock s/p intubation. Worsening hemodynamics due to cardiogenic shock s/p VA ECMO with subsequent decannulation on 12/8, now with IABP in place and inotropic support. Of ntoe, pt with ERIC on CVVH for volume overload, empiric abx for presumed infection of unknown source, and small SDH noted w/ likely resolution on follow-up scans. Now for mitral MONROE to treat his severe MR. Tentatively planned for tracheostomy later today. 61 yo M w/ PMHx of CAD s/p CABG, DM, HTN, HLD, GERD presenting as transfer from [place] for c/f STEMI in setting of acute cholecystitis and gallstone pancreatitis. Acute hypoxemic respiratory failure thought to be secondary to ARDS vs cardiogenic shock s/p intubation. Worsening hemodynamics due to cardiogenic shock s/p VA ECMO with subsequent decannulation on 12/8, now with IABP in place and inotropic support. Of ntoe, pt with ERIC on CVVH for volume overload, empiric abx for presumed infection of unknown source, and small SDH noted w/ likely resolution on follow-up scans. Now for mitral MONROE to treat his severe MR. Tentatively planned for tracheostomy later today.

## 2022-12-15 NOTE — PROGRESS NOTE ADULT - SUBJECTIVE AND OBJECTIVE BOX
infectious diseases progress note:    Patient is a 62y old  Male who presents with a chief complaint of Acute cholecystitis, gallstone pancreatitis (15 Dec 2022 09:06)        Acute pancreatitis without infection or necrosis               Allergies    No Known Allergies    Intolerances        ANTIBIOTICS/RELEVANT:  antimicrobials  fluconAZOLE IVPB      fluconAZOLE IVPB 200 milliGRAM(s) IV Intermittent every 24 hours  meropenem  IVPB 1000 milliGRAM(s) IV Intermittent every 12 hours  vancomycin  IVPB 1000 milliGRAM(s) IV Intermittent every 24 hours    immunologic:    OTHER:  acetaminophen    Suspension .. 650 milliGRAM(s) Enteral Tube every 6 hours PRN  albuterol/ipratropium for Nebulization 3 milliLiter(s) Nebulizer every 6 hours  aMIOdarone    Tablet   Oral   aMIOdarone    Tablet 200 milliGRAM(s) Oral daily  atorvastatin 80 milliGRAM(s) Oral at bedtime  chlorhexidine 0.12% Liquid 15 milliLiter(s) Oral Mucosa every 12 hours  chlorhexidine 2% Cloths 1 Application(s) Topical <User Schedule>  CRRT Treatment    <Continuous>  dexMEDEtomidine Infusion 0.3 MICROgram(s)/kG/Hr IV Continuous <Continuous>  heparin  Infusion 800 Unit(s)/Hr IV Continuous <Continuous>  heparin  Infusion Syringe 300 Unit(s)/Hr CRRT <Continuous>  insulin lispro (ADMELOG) corrective regimen sliding scale   SubCutaneous every 4 hours  lidocaine   4% Patch 1 Patch Transdermal daily PRN  metoclopramide Injectable 5 milliGRAM(s) IV Push every 8 hours  milrinone Infusion 0.1 MICROgram(s)/kG/Min IV Continuous <Continuous>  multivitamin/minerals/iron Oral Solution (CENTRUM) 15 milliLiter(s) Oral daily  pantoprazole  Injectable 40 milliGRAM(s) IV Push daily  Phoxillum Filtration BK 4 / 2.5 5000 milliLiter(s) CRRT <Continuous>  Phoxillum Filtration BK 4 / 2.5 5000 milliLiter(s) CRRT <Continuous>  polyethylene glycol 3350 17 Gram(s) Oral two times a day  PrismaSOL Filtration BGK 4 / 2.5 5000 milliLiter(s) CRRT <Continuous>  senna 2 Tablet(s) Oral at bedtime  sodium chloride 0.9%. 1000 milliLiter(s) IV Continuous <Continuous>  thiamine 100 milliGRAM(s) Enteral Tube daily  vasopressin Infusion 0.02 Unit(s)/Min IV Continuous <Continuous>      Objective:  Vital Signs Last 24 Hrs  T(C): 37.1 (15 Dec 2022 08:00), Max: 38 (14 Dec 2022 20:00)  T(F): 98.8 (15 Dec 2022 08:00), Max: 100.4 (14 Dec 2022 20:00)  HR: 100 (15 Dec 2022 10:00) (96 - 118)  BP: --  BP(mean): --  RR: 24 (15 Dec 2022 10:00) (12 - 35)  SpO2: 100% (15 Dec 2022 10:00) (94% - 100%)    Parameters below as of 15 Dec 2022 08:00  Patient On (Oxygen Delivery Method): ventilator    O2 Concentration (%): 40    PHYSICAL EXAM:     Eyes:INOCENCIO, EOMI  Ear/Nose/Throat: no oral lesion, no sinus tenderness on percussion	  Neck:no JVD, no lymphadenopathy, supple  Respiratory: CTA lore  Cardiovascular: S1S2 RRR, no murmurs  Gastrointestinal:soft, (+) BS, no HSM  Extremities:no e/e/c        LABS:                        8.8    15.87 )-----------( 107      ( 15 Dec 2022 00:45 )             28.7     12-15    137  |  102  |  23  ----------------------------<  174<H>  4.3   |  23  |  1.63<H>    Ca    7.6<L>      15 Dec 2022 00:46  Phos  4.0     12-15  Mg     2.6     12-15    TPro  6.4  /  Alb  2.9<L>  /  TBili  1.0  /  DBili  x   /  AST  36  /  ALT  16  /  AlkPhos  116  12-15    PT/INR - ( 15 Dec 2022 00:45 )   PT: 12.7 sec;   INR: 1.10 ratio         PTT - ( 15 Dec 2022 09:09 )  PTT:92.7 sec        MICROBIOLOGY:    RECENT CULTURES:  12-11 @ 00:26 Catheterized Catheterized                No growth    12-10 @ 15:45 .Blood Blood-Peripheral                No growth to date.    12-10 @ 15:30 .Blood Blood-Peripheral                No growth to date.    12-10 @ 10:49 ET Tube ET Tube       Moderate polymorphonuclear leukocytes per low power field  No Squamous epithelial cells per low power field  Few Yeast like cells per oil power field           Normal Respiratory Christy present    12-09 @ 04:58 ET Tube ET Tube       Numerous polymorphonuclear leukocytes per low power field  Moderate Squamous epithelial cells per low power field  Few Yeast like cells  per oil power field           Normal Respiratory Christy present    12-08 @ 23:45 .Blood Blood-Peripheral                No Growth Final    12-08 @ 23:44 .Blood Blood-Peripheral                No Growth Final          RESPIRATORY CULTURES:              RADIOLOGY & ADDITIONAL STUDIES:        Pager [phone]  After 5 pm/weekends or if no response :[phone] infectious diseases progress note:    Patient is a 62y old  Male who presents with a chief complaint of Acute cholecystitis, gallstone pancreatitis (15 Dec 2022 09:06)        Acute pancreatitis without infection or necrosis               Allergies    No Known Allergies    Intolerances        ANTIBIOTICS/RELEVANT:  antimicrobials  fluconAZOLE IVPB      fluconAZOLE IVPB 200 milliGRAM(s) IV Intermittent every 24 hours  meropenem  IVPB 1000 milliGRAM(s) IV Intermittent every 12 hours  vancomycin  IVPB 1000 milliGRAM(s) IV Intermittent every 24 hours    immunologic:    OTHER:  acetaminophen    Suspension .. 650 milliGRAM(s) Enteral Tube every 6 hours PRN  albuterol/ipratropium for Nebulization 3 milliLiter(s) Nebulizer every 6 hours  aMIOdarone    Tablet   Oral   aMIOdarone    Tablet 200 milliGRAM(s) Oral daily  atorvastatin 80 milliGRAM(s) Oral at bedtime  chlorhexidine 0.12% Liquid 15 milliLiter(s) Oral Mucosa every 12 hours  chlorhexidine 2% Cloths 1 Application(s) Topical <User Schedule>  CRRT Treatment    <Continuous>  dexMEDEtomidine Infusion 0.3 MICROgram(s)/kG/Hr IV Continuous <Continuous>  heparin  Infusion 800 Unit(s)/Hr IV Continuous <Continuous>  heparin  Infusion Syringe 300 Unit(s)/Hr CRRT <Continuous>  insulin lispro (ADMELOG) corrective regimen sliding scale   SubCutaneous every 4 hours  lidocaine   4% Patch 1 Patch Transdermal daily PRN  metoclopramide Injectable 5 milliGRAM(s) IV Push every 8 hours  milrinone Infusion 0.1 MICROgram(s)/kG/Min IV Continuous <Continuous>  multivitamin/minerals/iron Oral Solution (CENTRUM) 15 milliLiter(s) Oral daily  pantoprazole  Injectable 40 milliGRAM(s) IV Push daily  Phoxillum Filtration BK 4 / 2.5 5000 milliLiter(s) CRRT <Continuous>  Phoxillum Filtration BK 4 / 2.5 5000 milliLiter(s) CRRT <Continuous>  polyethylene glycol 3350 17 Gram(s) Oral two times a day  PrismaSOL Filtration BGK 4 / 2.5 5000 milliLiter(s) CRRT <Continuous>  senna 2 Tablet(s) Oral at bedtime  sodium chloride 0.9%. 1000 milliLiter(s) IV Continuous <Continuous>  thiamine 100 milliGRAM(s) Enteral Tube daily  vasopressin Infusion 0.02 Unit(s)/Min IV Continuous <Continuous>      Objective:  Vital Signs Last 24 Hrs  T(C): 37.1 (15 Dec 2022 08:00), Max: 38 (14 Dec 2022 20:00)  T(F): 98.8 (15 Dec 2022 08:00), Max: 100.4 (14 Dec 2022 20:00)  HR: 100 (15 Dec 2022 10:00) (96 - 118)  BP: --  BP(mean): --  RR: 24 (15 Dec 2022 10:00) (12 - 35)  SpO2: 100% (15 Dec 2022 10:00) (94% - 100%)    Parameters below as of 15 Dec 2022 08:00  Patient On (Oxygen Delivery Method): ventilator    O2 Concentration (%): 40    PHYSICAL EXAM:     Eyes:INOCENCIO, EOMI  Ear/Nose/Throat: no oral lesion, no sinus tenderness on percussion	  Neck:no JVD, no lymphadenopathy, supple  Respiratory: CTA lore  Cardiovascular: S1S2 RRR, no murmurs  Gastrointestinal:soft, (+) BS, no HSM  Extremities:no e/e/c        LABS:                        8.8    15.87 )-----------( 107      ( 15 Dec 2022 00:45 )             28.7     12-15    137  |  102  |  23  ----------------------------<  174<H>  4.3   |  23  |  1.63<H>    Ca    7.6<L>      15 Dec 2022 00:46  Phos  4.0     12-15  Mg     2.6     12-15    TPro  6.4  /  Alb  2.9<L>  /  TBili  1.0  /  DBili  x   /  AST  36  /  ALT  16  /  AlkPhos  116  12-15    PT/INR - ( 15 Dec 2022 00:45 )   PT: 12.7 sec;   INR: 1.10 ratio         PTT - ( 15 Dec 2022 09:09 )  PTT:92.7 sec        MICROBIOLOGY:    RECENT CULTURES:  12-11 @ 00:26 Catheterized Catheterized                No growth    12-10 @ 15:45 .Blood Blood-Peripheral                No growth to date.    12-10 @ 15:30 .Blood Blood-Peripheral                No growth to date.    12-10 @ 10:49 ET Tube ET Tube       Moderate polymorphonuclear leukocytes per low power field  No Squamous epithelial cells per low power field  Few Yeast like cells per oil power field           Normal Respiratory Christy present    12-09 @ 04:58 ET Tube ET Tube       Numerous polymorphonuclear leukocytes per low power field  Moderate Squamous epithelial cells per low power field  Few Yeast like cells  per oil power field           Normal Respiratory Christy present    12-08 @ 23:45 .Blood Blood-Peripheral                No Growth Final    12-08 @ 23:44 .Blood Blood-Peripheral                No Growth Final          RESPIRATORY CULTURES:              RADIOLOGY & ADDITIONAL STUDIES:        Pager [phone]  After 5 pm/weekends or if no response :[phone] infectious diseases progress note:    Patient is a 62y old  Male who presents with a chief complaint of Acute cholecystitis, gallstone pancreatitis (15 Dec 2022 09:06)        Acute pancreatitis without infection or necrosis               Allergies    No Known Allergies    Intolerances        ANTIBIOTICS/RELEVANT:  antimicrobials  fluconAZOLE IVPB      fluconAZOLE IVPB 200 milliGRAM(s) IV Intermittent every 24 hours  meropenem  IVPB 1000 milliGRAM(s) IV Intermittent every 12 hours  vancomycin  IVPB 1000 milliGRAM(s) IV Intermittent every 24 hours    immunologic:    OTHER:  acetaminophen    Suspension .. 650 milliGRAM(s) Enteral Tube every 6 hours PRN  albuterol/ipratropium for Nebulization 3 milliLiter(s) Nebulizer every 6 hours  aMIOdarone    Tablet   Oral   aMIOdarone    Tablet 200 milliGRAM(s) Oral daily  atorvastatin 80 milliGRAM(s) Oral at bedtime  chlorhexidine 0.12% Liquid 15 milliLiter(s) Oral Mucosa every 12 hours  chlorhexidine 2% Cloths 1 Application(s) Topical <User Schedule>  CRRT Treatment    <Continuous>  dexMEDEtomidine Infusion 0.3 MICROgram(s)/kG/Hr IV Continuous <Continuous>  heparin  Infusion 800 Unit(s)/Hr IV Continuous <Continuous>  heparin  Infusion Syringe 300 Unit(s)/Hr CRRT <Continuous>  insulin lispro (ADMELOG) corrective regimen sliding scale   SubCutaneous every 4 hours  lidocaine   4% Patch 1 Patch Transdermal daily PRN  metoclopramide Injectable 5 milliGRAM(s) IV Push every 8 hours  milrinone Infusion 0.1 MICROgram(s)/kG/Min IV Continuous <Continuous>  multivitamin/minerals/iron Oral Solution (CENTRUM) 15 milliLiter(s) Oral daily  pantoprazole  Injectable 40 milliGRAM(s) IV Push daily  Phoxillum Filtration BK 4 / 2.5 5000 milliLiter(s) CRRT <Continuous>  Phoxillum Filtration BK 4 / 2.5 5000 milliLiter(s) CRRT <Continuous>  polyethylene glycol 3350 17 Gram(s) Oral two times a day  PrismaSOL Filtration BGK 4 / 2.5 5000 milliLiter(s) CRRT <Continuous>  senna 2 Tablet(s) Oral at bedtime  sodium chloride 0.9%. 1000 milliLiter(s) IV Continuous <Continuous>  thiamine 100 milliGRAM(s) Enteral Tube daily  vasopressin Infusion 0.02 Unit(s)/Min IV Continuous <Continuous>      Objective:  Vital Signs Last 24 Hrs  T(C): 37.1 (15 Dec 2022 08:00), Max: 38 (14 Dec 2022 20:00)  T(F): 98.8 (15 Dec 2022 08:00), Max: 100.4 (14 Dec 2022 20:00)  HR: 100 (15 Dec 2022 10:00) (96 - 118)  BP: --  BP(mean): --  RR: 24 (15 Dec 2022 10:00) (12 - 35)  SpO2: 100% (15 Dec 2022 10:00) (94% - 100%)    Parameters below as of 15 Dec 2022 08:00  Patient On (Oxygen Delivery Method): ventilator    O2 Concentration (%): 40    PHYSICAL EXAM:     Eyes:INOCENCIO, EOMI  Ear/Nose/Throat: no oral lesion, no sinus tenderness on percussion	  Neck:no JVD, no lymphadenopathy, supple  Respiratory: CTA lore  Cardiovascular: S1S2 RRR, no murmurs  Gastrointestinal:soft, (+) BS, no HSM  Extremities:no e/e/c        LABS:                        8.8    15.87 )-----------( 107      ( 15 Dec 2022 00:45 )             28.7     12-15    137  |  102  |  23  ----------------------------<  174<H>  4.3   |  23  |  1.63<H>    Ca    7.6<L>      15 Dec 2022 00:46  Phos  4.0     12-15  Mg     2.6     12-15    TPro  6.4  /  Alb  2.9<L>  /  TBili  1.0  /  DBili  x   /  AST  36  /  ALT  16  /  AlkPhos  116  12-15    PT/INR - ( 15 Dec 2022 00:45 )   PT: 12.7 sec;   INR: 1.10 ratio         PTT - ( 15 Dec 2022 09:09 )  PTT:92.7 sec        MICROBIOLOGY:    RECENT CULTURES:  12-11 @ 00:26 Catheterized Catheterized                No growth    12-10 @ 15:45 .Blood Blood-Peripheral                No growth to date.    12-10 @ 15:30 .Blood Blood-Peripheral                No growth to date.    12-10 @ 10:49 ET Tube ET Tube       Moderate polymorphonuclear leukocytes per low power field  No Squamous epithelial cells per low power field  Few Yeast like cells per oil power field           Normal Respiratory Christy present    12-09 @ 04:58 ET Tube ET Tube       Numerous polymorphonuclear leukocytes per low power field  Moderate Squamous epithelial cells per low power field  Few Yeast like cells  per oil power field           Normal Respiratory Christy present    12-08 @ 23:45 .Blood Blood-Peripheral                No Growth Final    12-08 @ 23:44 .Blood Blood-Peripheral                No Growth Final          RESPIRATORY CULTURES:              RADIOLOGY & ADDITIONAL STUDIES:        Pager [phone]  After 5 pm/weekends or if no response :[phone]

## 2022-12-15 NOTE — CHART NOTE - NSCHARTNOTEFT_GEN_A_CORE
Preop Dx: Respiratory Failure   Surgeon: Dr Hill   Procedure: Tracheostomy    Vital Signs Last 24 Hrs  T(C): 37 (15 Dec 2022 20:52), Max: 37.2 (15 Dec 2022 00:00)  T(F): 98.6 (15 Dec 2022 20:00), Max: 99 (15 Dec 2022 00:00)  HR: 96 (15 Dec 2022 23:18) (93 - 118)  BP: 108/54 (15 Dec 2022 20:52) (108/54 - 108/54)  BP(mean): 76 (15 Dec 2022 20:52) (76 - 76)  RR: 18 (15 Dec 2022 23:00) (12 - 33)  SpO2: 100% (15 Dec 2022 23:18) (96% - 100%)    Parameters below as of 15 Dec 2022 23:18  Patient On (Oxygen Delivery Method): ventilator                            8.8    15.87 )-----------( 107      ( 15 Dec 2022 00:45 )             28.7     12-15    137  |  102  |  23  ----------------------------<  174<H>  4.3   |  23  |  1.63<H>    Ca    7.6<L>      15 Dec 2022 00:46  Phos  4.0     12-15  Mg     2.6     12-15    TPro  6.4  /  Alb  2.9<L>  /  TBili  1.0  /  DBili  x   /  AST  36  /  ALT  16  /  AlkPhos  116  12-15    PT/INR - ( 15 Dec 2022 00:45 )   PT: 12.7 sec;   INR: 1.10 ratio         PTT - ( 15 Dec 2022 20:31 )  PTT:52.9 sec  Daily Height in cm: 172.72 (15 Dec 2022 20:52)    Daily Weight in k.6 (15 Dec 2022 00:00)    EKG:  Ventricular Rate 114 BPM    Atrial Rate 114 BPM    P-R Interval 150 ms    QRS Duration 100 ms    Q-T Interval 344 ms    QTC Calculation(Bazett) 474 ms    P Axis 36 degrees    R Axis 10 degrees    T Axis 150 degrees    Diagnosis Line SINUS TACHYCARDIA  INFERIOR INFARCT (CITED ON OR BEFORE 2022)  MARKED ST ABNORMALITY, POSSIBLE LATERAL SUBENDOCARDIAL INJURY  ** ** ACUTE MI / STEMI ** **  Consider right ventricular involvement in acute inferior infarct  ABNORMAL ECG  WHEN COMPARED WITH ECG OF 09-DEC-2022 15:08  Confirmed by KELLY MTZ MD [id number]) on 12/10/2022 11:26:36 AM      CXR:     ACC: [id number] EXAM:  XR CHEST PORTABLE ROUTINE 1V                          PROCEDURE DATE:  12/15/2022          INTERPRETATION:  EXAMINATION: XR CHEST    CLINICAL INDICATION: Cardiothoracic surgery    TECHNIQUE: Single frontal, portable view of the chest was obtained.    COMPARISON: Chest x-ray 2022.    FINDINGS:  Endotracheal tube terminates above the emeterio. Enteric tube with tip in   the stomach. Right IJ central venous catheter with tip in the SVC. Right   subclavian central venous catheter with tip in the SVC.  IABP with tip approximately 3 cm below the top of the aortic arch.  The heart is stable in size. Median sternotomy.  Redemonstrated bilateral patchy perihilar opacities, unchanged.  No pneumothorax or pleural effusion.    IMPRESSION:  Mild pulmonary edema, unchanged.  Lines and tubes as above.    --- End of Report ---            Type and Screen: AB positive (neg)          Plan:  - OR 22 for tracheostomy with Dr. Hill  - NPO after midnight except meds  - IVF while NPO  - Consent done  - Medical clearance for OR Preop Dx: Respiratory Failure   Surgeon: Dr Hill   Procedure: Tracheostomy    Vital Signs Last 24 Hrs  T(C): 37 (15 Dec 2022 20:52), Max: 37.2 (15 Dec 2022 00:00)  T(F): 98.6 (15 Dec 2022 20:00), Max: 99 (15 Dec 2022 00:00)  HR: 96 (15 Dec 2022 23:18) (93 - 118)  BP: 108/54 (15 Dec 2022 20:52) (108/54 - 108/54)  BP(mean): 76 (15 Dec 2022 20:52) (76 - 76)  RR: 18 (15 Dec 2022 23:00) (12 - 33)  SpO2: 100% (15 Dec 2022 23:18) (96% - 100%)    Parameters below as of 15 Dec 2022 23:18  Patient On (Oxygen Delivery Method): ventilator                            8.8    15.87 )-----------( 107      ( 15 Dec 2022 00:45 )             28.7     12-15    137  |  102  |  23  ----------------------------<  174<H>  4.3   |  23  |  1.63<H>    Ca    7.6<L>      15 Dec 2022 00:46  Phos  4.0     12-15  Mg     2.6     12-15    TPro  6.4  /  Alb  2.9<L>  /  TBili  1.0  /  DBili  x   /  AST  36  /  ALT  16  /  AlkPhos  116  12-15    PT/INR - ( 15 Dec 2022 00:45 )   PT: 12.7 sec;   INR: 1.10 ratio         PTT - ( 15 Dec 2022 20:31 )  PTT:52.9 sec  Daily Height in cm: 172.72 (15 Dec 2022 20:52)    Daily Weight in k.6 (15 Dec 2022 00:00)    EKG:  Ventricular Rate 114 BPM    Atrial Rate 114 BPM    P-R Interval 150 ms    QRS Duration 100 ms    Q-T Interval 344 ms    QTC Calculation(Bazett) 474 ms    P Axis 36 degrees    R Axis 10 degrees    T Axis 150 degrees    Diagnosis Line SINUS TACHYCARDIA  INFERIOR INFARCT (CITED ON OR BEFORE 2022)  MARKED ST ABNORMALITY, POSSIBLE LATERAL SUBENDOCARDIAL INJURY  ** ** ACUTE MI / STEMI ** **  Consider right ventricular involvement in acute inferior infarct  ABNORMAL ECG  WHEN COMPARED WITH ECG OF 09-DEC-2022 15:08  Confirmed by KELLY MTZ MD [id number]) on 12/10/2022 11:26:36 AM      CXR:     ACC: [id number] EXAM:  XR CHEST PORTABLE ROUTINE 1V                          PROCEDURE DATE:  12/15/2022          INTERPRETATION:  EXAMINATION: XR CHEST    CLINICAL INDICATION: Cardiothoracic surgery    TECHNIQUE: Single frontal, portable view of the chest was obtained.    COMPARISON: Chest x-ray 2022.    FINDINGS:  Endotracheal tube terminates above the emeterio. Enteric tube with tip in   the stomach. Right IJ central venous catheter with tip in the SVC. Right   subclavian central venous catheter with tip in the SVC.  IABP with tip approximately 3 cm below the top of the aortic arch.  The heart is stable in size. Median sternotomy.  Redemonstrated bilateral patchy perihilar opacities, unchanged.  No pneumothorax or pleural effusion.    IMPRESSION:  Mild pulmonary edema, unchanged.  Lines and tubes as above.    --- End of Report ---            Type and Screen: AB positive (neg)          Plan:  - OR 22 for tracheostomy with Dr. Hill  - NPO after midnight except meds  - IVF while NPO  - Consent done  - Medical clearance for OR Preop Dx: Respiratory Failure   Surgeon: Dr Hill   Procedure: Tracheostomy    Vital Signs Last 24 Hrs  T(C): 37 (15 Dec 2022 20:52), Max: 37.2 (15 Dec 2022 00:00)  T(F): 98.6 (15 Dec 2022 20:00), Max: 99 (15 Dec 2022 00:00)  HR: 96 (15 Dec 2022 23:18) (93 - 118)  BP: 108/54 (15 Dec 2022 20:52) (108/54 - 108/54)  BP(mean): 76 (15 Dec 2022 20:52) (76 - 76)  RR: 18 (15 Dec 2022 23:00) (12 - 33)  SpO2: 100% (15 Dec 2022 23:18) (96% - 100%)    Parameters below as of 15 Dec 2022 23:18  Patient On (Oxygen Delivery Method): ventilator                            8.8    15.87 )-----------( 107      ( 15 Dec 2022 00:45 )             28.7     12-15    137  |  102  |  23  ----------------------------<  174<H>  4.3   |  23  |  1.63<H>    Ca    7.6<L>      15 Dec 2022 00:46  Phos  4.0     12-15  Mg     2.6     12-15    TPro  6.4  /  Alb  2.9<L>  /  TBili  1.0  /  DBili  x   /  AST  36  /  ALT  16  /  AlkPhos  116  12-15    PT/INR - ( 15 Dec 2022 00:45 )   PT: 12.7 sec;   INR: 1.10 ratio         PTT - ( 15 Dec 2022 20:31 )  PTT:52.9 sec  Daily Height in cm: 172.72 (15 Dec 2022 20:52)    Daily Weight in k.6 (15 Dec 2022 00:00)    EKG:  Ventricular Rate 114 BPM    Atrial Rate 114 BPM    P-R Interval 150 ms    QRS Duration 100 ms    Q-T Interval 344 ms    QTC Calculation(Bazett) 474 ms    P Axis 36 degrees    R Axis 10 degrees    T Axis 150 degrees    Diagnosis Line SINUS TACHYCARDIA  INFERIOR INFARCT (CITED ON OR BEFORE 2022)  MARKED ST ABNORMALITY, POSSIBLE LATERAL SUBENDOCARDIAL INJURY  ** ** ACUTE MI / STEMI ** **  Consider right ventricular involvement in acute inferior infarct  ABNORMAL ECG  WHEN COMPARED WITH ECG OF 09-DEC-2022 15:08  Confirmed by KELLY MTZ MD [id number]) on 12/10/2022 11:26:36 AM      CXR:     ACC: [id number] EXAM:  XR CHEST PORTABLE ROUTINE 1V                          PROCEDURE DATE:  12/15/2022          INTERPRETATION:  EXAMINATION: XR CHEST    CLINICAL INDICATION: Cardiothoracic surgery    TECHNIQUE: Single frontal, portable view of the chest was obtained.    COMPARISON: Chest x-ray 2022.    FINDINGS:  Endotracheal tube terminates above the emeterio. Enteric tube with tip in   the stomach. Right IJ central venous catheter with tip in the SVC. Right   subclavian central venous catheter with tip in the SVC.  IABP with tip approximately 3 cm below the top of the aortic arch.  The heart is stable in size. Median sternotomy.  Redemonstrated bilateral patchy perihilar opacities, unchanged.  No pneumothorax or pleural effusion.    IMPRESSION:  Mild pulmonary edema, unchanged.  Lines and tubes as above.    --- End of Report ---            Type and Screen: AB positive (neg)          Plan:  - OR 22 for tracheostomy with Dr. Hill  - NPO after midnight except meds  - IVF while NPO  - Consent done  - Medical clearance for OR

## 2022-12-15 NOTE — PROGRESS NOTE ADULT - SUBJECTIVE AND OBJECTIVE BOX
Patient seen and examined at the bedside.    Remained critically ill on continuous ICU monitoring.      Brief Summary:  Cardiogenic shock s/p VA ECMO decannulated 12/8/22. Currently supported with an IABP.        24 Hour events:  Trach planning possibly Fri  IABP wean overnight, planning mitral clip       OBJECTIVE:  Vital Signs Last 24 Hrs  T(C): 37.2 (15 Dec 2022 04:00), Max: 38 (14 Dec 2022 20:00)  T(F): 99 (15 Dec 2022 04:00), Max: 100.4 (14 Dec 2022 20:00)  HR: 105 (15 Dec 2022 06:20) (96 - 118)  BP: --  BP(mean): --  RR: 23 (15 Dec 2022 06:00) (12 - 35)  SpO2: 100% (15 Dec 2022 06:20) (94% - 100%)    Parameters below as of 15 Dec 2022 05:30  Patient On (Oxygen Delivery Method): ventilator        Mode: CPAP with PS  FiO2: 40  PEEP: 5  PS: 5  MAP: 8  PIP: 16              -------------------------------------------------------------------------------------------------------------------------------  Physical Exam:   General: Intubated, multiple lines gtt  Neurology: on/off sedation  Eyes: bilateral pupils equal and reactive   ENT/Neck: +ETT midline, Neck supple, trachea midline, No JVD   Respiratory: rhonchi bilaterally   CV: S1S2, no murmurs        [x] Sinus Tachycardia  Abdominal: Softly distended,+BS   Extremities: 1+ pedal edema noted, + peripheral pulses palpable, warm  Skin: No Rashes, Hematoma, Ecchymosis                                8.8    15.87 )-----------( 107      ( 15 Dec 2022 00:45 )             28.7     12-15    137  |  102  |  23  ----------------------------<  174<H>  4.3   |  23  |  1.63<H>    Ca    7.6<L>      15 Dec 2022 00:46  Phos  4.0     12-15  Mg     2.6     12-15    TPro  6.4  /  Alb  2.9<L>  /  TBili  1.0  /  DBili  x   /  AST  36  /  ALT  16  /  AlkPhos  116  12-15    LIVER FUNCTIONS - ( 15 Dec 2022 00:46 )  Alb: 2.9 g/dL / Pro: 6.4 g/dL / ALK PHOS: 116 U/L / ALT: 16 U/L / AST: 36 U/L / GGT: x           PT/INR - ( 15 Dec 2022 00:45 )   PT: 12.7 sec;   INR: 1.10 ratio         PTT - ( 15 Dec 2022 04:38 )  PTT:97.6 sec  ABG - ( 14 Dec 2022 23:57 )  pH, Arterial: 7.41  pH, Blood: x     /  pCO2: 34    /  pO2: 91    / HCO3: 22    / Base Excess: -2.6  /  SaO2: 97.6              ------------------------------------------------------------------------------------------------------------------------------  Assessment:  63 y/o male with PMH of CABG, DM, HTN, HLD presenting as transfer from [facility] for c/f STEMI. PTA arrival received 325 aspirin, 600 plavix, and no heparin drip started. Around 1:30 am patient had multiple episodes of non-bloody diarrhea and emesis with associated abdominal pain and chest pain, unable to localize, non-radiating, with associated SOB and no associated palpitations or diaphoresis. No recent fevers/chills, cough, rashes, or changes in urination. Does report mild diffuse back pain; started 5 days ago in setting on injury while walking and lifting objects. Denies focal weakness, numbness/tingling, or LOC due does report mild dizziness.    acute respiratory distress/failure, intubated ,   cardiogenic shock s/p VA ECMO decannulated 12/8  CAD/ASHD/CABG, acute MI  cardiogenic shock  Hemodynamic instability  acute renal failure  encounter management IABP  encounter weaning ventilator  Leukocytosis   Thrombocytopenia  Anemia  acute pancreatitis        Plan:   ***Neuro***  CT head showed 4mm subdural hematoma 11/26: repeat CT head unchanged 12/01  Soft palate laceration, ENT scoped 12/8, stable, no acute intervention at this time  Patient sedated w/ low dose Precedex   Repeat Head CT 12/13 Similar minimal increased density along the right aspect of the posterior   falx and right tentorial leaf, consistent with minimal residual subdural   hemorrhage.  No parenchymal hemorrhage or brain edema.  Postoperative acute pain control with Tylenol    ***Cardiovascular***  Invasive hemodynamic monitoring, assess perfusion indices   At high risk for hemodynamic instability and cardiac arrhythmias.  Lactate 1.1, continue trending   Continue low dose Milrinone for now.  L Fem IABP 1:1 with good augmentation - weaning trial overnight .   Discussed with Dr. Rodrigues - GRZEGORZ reviewed with structural heart team for mitral clip evaluation.  AC Therapy with Heparin gtt pAF/flutter PTT 44  Amiodarone for rate control  c/w Statin           ***Pulmonary***  Acute respiratory failure  Wean ventilator as tolerated.          ***GI***  Protein calorie malnutrition - Tube feeds via nasal feeding tube.  Pancreatitis   Protonix for stress ulcer prophylaxis   Bowel regimen with Miralax and Senna  Reglan for gut motility       ***Renal***  ERIC, renal following, continue CRRT  Volume removal as tolerated.  Replete electrolytes as indicated.      ***ID***  Empiric coverage with Vancomycin and Meropenem   Diflucan for prophiolaxia   Plan to CT to rule out infection     ***Endocrine***  Hx of DM2, continue glycemic control w/ ISS.      Patient requires continuous monitoring with bedside rhythm monitoring, pulse oximetry monitoring, and continuous central venous and arterial pressure monitoring; and intermittent blood gas analysis. Care plan discussed with the ICU care team.   Patient remained critical, at risk for life threatening decompensation.    I have spent 30 minutes providing critical care management to this patient.    By signing my name below, I, Abhijit Huber, attest that this documentation has been prepared under the direction and in the presence of Jade Rosas MD  Electronically signed: Brian Slaughter, 12-15-22 @ 07:53    I, Jade Rosas MD, personally performed the services described in this documentation. all medical record entries made by the scribe were at my direction and in my presence. I have reviewed the chart and agree that the record reflects my personal performance and is accurate and complete  Electronically signed: Jade Rosas MD Patient seen and examined at the bedside.    Remained critically ill on continuous ICU monitoring.      Brief Summary:  Cardiogenic shock s/p VA ECMO decannulated 12/8/22. Currently supported with an IABP.        24 Hour events:  Trach planning possibly Fri  IABP wean overnight, planning mitral clip       OBJECTIVE:  Vital Signs Last 24 Hrs  T(C): 37.2 (15 Dec 2022 04:00), Max: 38 (14 Dec 2022 20:00)  T(F): 99 (15 Dec 2022 04:00), Max: 100.4 (14 Dec 2022 20:00)  HR: 105 (15 Dec 2022 06:20) (96 - 118)  BP: --  BP(mean): --  RR: 23 (15 Dec 2022 06:00) (12 - 35)  SpO2: 100% (15 Dec 2022 06:20) (94% - 100%)    Parameters below as of 15 Dec 2022 05:30  Patient On (Oxygen Delivery Method): ventilator        Mode: CPAP with PS  FiO2: 40  PEEP: 5  PS: 5  MAP: 8  PIP: 16              -------------------------------------------------------------------------------------------------------------------------------  Physical Exam:   General: Intubated, multiple lines gtt  Neurology: on/off sedation  Eyes: bilateral pupils equal and reactive   ENT/Neck: +ETT midline, Neck supple, trachea midline, No JVD   Respiratory: rhonchi bilaterally   CV: S1S2, no murmurs        [x] Sinus Tachycardia  Abdominal: Softly distended,+BS   Extremities: 1+ pedal edema noted, + peripheral pulses palpable, warm  Skin: No Rashes, Hematoma, Ecchymosis                                8.8    15.87 )-----------( 107      ( 15 Dec 2022 00:45 )             28.7     12-15    137  |  102  |  23  ----------------------------<  174<H>  4.3   |  23  |  1.63<H>    Ca    7.6<L>      15 Dec 2022 00:46  Phos  4.0     12-15  Mg     2.6     12-15    TPro  6.4  /  Alb  2.9<L>  /  TBili  1.0  /  DBili  x   /  AST  36  /  ALT  16  /  AlkPhos  116  12-15    LIVER FUNCTIONS - ( 15 Dec 2022 00:46 )  Alb: 2.9 g/dL / Pro: 6.4 g/dL / ALK PHOS: 116 U/L / ALT: 16 U/L / AST: 36 U/L / GGT: x           PT/INR - ( 15 Dec 2022 00:45 )   PT: 12.7 sec;   INR: 1.10 ratio         PTT - ( 15 Dec 2022 04:38 )  PTT:97.6 sec  ABG - ( 14 Dec 2022 23:57 )  pH, Arterial: 7.41  pH, Blood: x     /  pCO2: 34    /  pO2: 91    / HCO3: 22    / Base Excess: -2.6  /  SaO2: 97.6              ------------------------------------------------------------------------------------------------------------------------------  Assessment:  61 y/o male with PMH of CABG, DM, HTN, HLD presenting as transfer from [place] for c/f STEMI. PTA arrival received 325 aspirin, 600 plavix, and no heparin drip started. Around 1:30 am patient had multiple episodes of non-bloody diarrhea and emesis with associated abdominal pain and chest pain, unable to localize, non-radiating, with associated SOB and no associated palpitations or diaphoresis. No recent fevers/chills, cough, rashes, or changes in urination. Does report mild diffuse back pain; started 5 days ago in setting on injury while walking and lifting objects. Denies focal weakness, numbness/tingling, or LOC due does report mild dizziness.    acute respiratory distress/failure, intubated ,   cardiogenic shock s/p VA ECMO decannulated 12/8  CAD/ASHD/CABG, acute MI  cardiogenic shock  Hemodynamic instability  acute renal failure  encounter management IABP  encounter weaning ventilator  Leukocytosis   Thrombocytopenia  Anemia  acute pancreatitis        Plan:   ***Neuro***  CT head showed 4mm subdural hematoma 11/26: repeat CT head unchanged 12/01  Soft palate laceration, ENT scoped 12/8, stable, no acute intervention at this time  Patient sedated w/ low dose Precedex   Repeat Head CT 12/13 Similar minimal increased density along the right aspect of the posterior   falx and right tentorial leaf, consistent with minimal residual subdural   hemorrhage.  No parenchymal hemorrhage or brain edema.  Postoperative acute pain control with Tylenol    ***Cardiovascular***  Invasive hemodynamic monitoring, assess perfusion indices   At high risk for hemodynamic instability and cardiac arrhythmias.  Lactate 1.1, continue trending   Continue low dose Milrinone for now.  L Fem IABP 1:1 with good augmentation - weaning trial overnight .   Discussed with Dr. Rodrigues - GRZEGORZ reviewed with structural heart team for mitral clip evaluation.  AC Therapy with Heparin gtt pAF/flutter PTT 44  Amiodarone for rate control  c/w Statin           ***Pulmonary***  Acute respiratory failure  Wean ventilator as tolerated.          ***GI***  Protein calorie malnutrition - Tube feeds via nasal feeding tube.  Pancreatitis   Protonix for stress ulcer prophylaxis   Bowel regimen with Miralax and Senna  Reglan for gut motility       ***Renal***  ERIC, renal following, continue CRRT  Volume removal as tolerated.  Replete electrolytes as indicated.      ***ID***  Empiric coverage with Vancomycin and Meropenem   Diflucan for prophiolaxia   Plan to CT to rule out infection     ***Endocrine***  Hx of DM2, continue glycemic control w/ ISS.      Patient requires continuous monitoring with bedside rhythm monitoring, pulse oximetry monitoring, and continuous central venous and arterial pressure monitoring; and intermittent blood gas analysis. Care plan discussed with the ICU care team.   Patient remained critical, at risk for life threatening decompensation.    I have spent 30 minutes providing critical care management to this patient.    By signing my name below, I, Abhijit Huber, attest that this documentation has been prepared under the direction and in the presence of Jade Rosas MD  Electronically signed: Brian Slaughter, 12-15-22 @ 07:53    I, Jade Rosas MD, personally performed the services described in this documentation. all medical record entries made by the scribe were at my direction and in my presence. I have reviewed the chart and agree that the record reflects my personal performance and is accurate and complete  Electronically signed: Jade Rosas MD Patient seen and examined at the bedside.    Remained critically ill on continuous ICU monitoring.      Brief Summary:  Cardiogenic shock s/p VA ECMO decannulated 12/8/22. Currently supported with an IABP.        24 Hour events:  Trach planning possibly Fri  IABP wean overnight, planning mitral clip       OBJECTIVE:  Vital Signs Last 24 Hrs  T(C): 37.2 (15 Dec 2022 04:00), Max: 38 (14 Dec 2022 20:00)  T(F): 99 (15 Dec 2022 04:00), Max: 100.4 (14 Dec 2022 20:00)  HR: 105 (15 Dec 2022 06:20) (96 - 118)  BP: --  BP(mean): --  RR: 23 (15 Dec 2022 06:00) (12 - 35)  SpO2: 100% (15 Dec 2022 06:20) (94% - 100%)    Parameters below as of 15 Dec 2022 05:30  Patient On (Oxygen Delivery Method): ventilator        Mode: CPAP with PS  FiO2: 40  PEEP: 5  PS: 5  MAP: 8  PIP: 16              -------------------------------------------------------------------------------------------------------------------------------  Physical Exam:   General: Intubated, multiple lines gtt  Neurology: on/off sedation  Eyes: bilateral pupils equal and reactive   ENT/Neck: +ETT midline, Neck supple, trachea midline, No JVD   Respiratory: rhonchi bilaterally   CV: S1S2, no murmurs        [x] Sinus Tachycardia  Abdominal: Softly distended,+BS   Extremities: 1+ pedal edema noted, + peripheral pulses palpable, warm  Skin: No Rashes, Hematoma, Ecchymosis                                8.8    15.87 )-----------( 107      ( 15 Dec 2022 00:45 )             28.7     12-15    137  |  102  |  23  ----------------------------<  174<H>  4.3   |  23  |  1.63<H>    Ca    7.6<L>      15 Dec 2022 00:46  Phos  4.0     12-15  Mg     2.6     12-15    TPro  6.4  /  Alb  2.9<L>  /  TBili  1.0  /  DBili  x   /  AST  36  /  ALT  16  /  AlkPhos  116  12-15    LIVER FUNCTIONS - ( 15 Dec 2022 00:46 )  Alb: 2.9 g/dL / Pro: 6.4 g/dL / ALK PHOS: 116 U/L / ALT: 16 U/L / AST: 36 U/L / GGT: x           PT/INR - ( 15 Dec 2022 00:45 )   PT: 12.7 sec;   INR: 1.10 ratio         PTT - ( 15 Dec 2022 04:38 )  PTT:97.6 sec  ABG - ( 14 Dec 2022 23:57 )  pH, Arterial: 7.41  pH, Blood: x     /  pCO2: 34    /  pO2: 91    / HCO3: 22    / Base Excess: -2.6  /  SaO2: 97.6              ------------------------------------------------------------------------------------------------------------------------------  Assessment:  63 y/o male with PMH of CABG, DM, HTN, HLD presenting as transfer from [place] for c/f STEMI. PTA arrival received 325 aspirin, 600 plavix, and no heparin drip started. Around 1:30 am patient had multiple episodes of non-bloody diarrhea and emesis with associated abdominal pain and chest pain, unable to localize, non-radiating, with associated SOB and no associated palpitations or diaphoresis. No recent fevers/chills, cough, rashes, or changes in urination. Does report mild diffuse back pain; started 5 days ago in setting on injury while walking and lifting objects. Denies focal weakness, numbness/tingling, or LOC due does report mild dizziness.    acute respiratory distress/failure, intubated ,   cardiogenic shock s/p VA ECMO decannulated 12/8  CAD/ASHD/CABG, acute MI  cardiogenic shock  Hemodynamic instability  acute renal failure  encounter management IABP  encounter weaning ventilator  Leukocytosis   Thrombocytopenia  Anemia  acute pancreatitis        Plan:   ***Neuro***  CT head showed 4mm subdural hematoma 11/26: repeat CT head unchanged 12/01  Soft palate laceration, ENT scoped 12/8, stable, no acute intervention at this time  Patient sedated w/ low dose Precedex   Repeat Head CT 12/13 Similar minimal increased density along the right aspect of the posterior   falx and right tentorial leaf, consistent with minimal residual subdural   hemorrhage.  No parenchymal hemorrhage or brain edema.  Postoperative acute pain control with Tylenol    ***Cardiovascular***  Invasive hemodynamic monitoring, assess perfusion indices   At high risk for hemodynamic instability and cardiac arrhythmias.  Lactate 1.1, continue trending   Continue low dose Milrinone for now.  L Fem IABP 1:1 with good augmentation - weaning trial overnight .   Discussed with Dr. Rodrigues - GRZEGORZ reviewed with structural heart team for mitral clip evaluation.  AC Therapy with Heparin gtt pAF/flutter PTT 44  Amiodarone for rate control  c/w Statin           ***Pulmonary***  Acute respiratory failure  Wean ventilator as tolerated.          ***GI***  Protein calorie malnutrition - Tube feeds via nasal feeding tube.  Pancreatitis   Protonix for stress ulcer prophylaxis   Bowel regimen with Miralax and Senna  Reglan for gut motility       ***Renal***  ERIC, renal following, continue CRRT  Volume removal as tolerated.  Replete electrolytes as indicated.      ***ID***  Empiric coverage with Vancomycin and Meropenem   Diflucan for prophiolaxia   Plan to CT to rule out infection     ***Endocrine***  Hx of DM2, continue glycemic control w/ ISS.      Patient requires continuous monitoring with bedside rhythm monitoring, pulse oximetry monitoring, and continuous central venous and arterial pressure monitoring; and intermittent blood gas analysis. Care plan discussed with the ICU care team.   Patient remained critical, at risk for life threatening decompensation.    I have spent 30 minutes providing critical care management to this patient.    By signing my name below, I, Abhijit Huber, attest that this documentation has been prepared under the direction and in the presence of Jade Rosas MD  Electronically signed: Brian Slaughter, 12-15-22 @ 07:53    I, Jade Rosas MD, personally performed the services described in this documentation. all medical record entries made by the scribe were at my direction and in my presence. I have reviewed the chart and agree that the record reflects my personal performance and is accurate and complete  Electronically signed: Jade Rosas MD Patient seen and examined at the bedside.    Remained critically ill on continuous ICU monitoring.      Brief Summary:  Cardiogenic shock s/p VA ECMO decannulated 12/8/22. Currently supported with an IABP.        24 Hour events:  - Trach planning possibly tomorrow   - IABP wean overnight, planning mitral clip and IABP removal tomorrow morning   - Clotting on CVVH circuit   - Febrile overnight   - Anticoagulation on hold         OBJECTIVE:  Vital Signs Last 24 Hrs  T(C): 37.2 (15 Dec 2022 04:00), Max: 38 (14 Dec 2022 20:00)  T(F): 99 (15 Dec 2022 04:00), Max: 100.4 (14 Dec 2022 20:00)  HR: 105 (15 Dec 2022 06:20) (96 - 118)  BP: --  BP(mean): --  RR: 23 (15 Dec 2022 06:00) (12 - 35)  SpO2: 100% (15 Dec 2022 06:20) (94% - 100%)    Parameters below as of 15 Dec 2022 05:30  Patient On (Oxygen Delivery Method): ventilator        Mode: CPAP with PS  FiO2: 40  PEEP: 5  PS: 5  MAP: 8  PIP: 16              -------------------------------------------------------------------------------------------------------------------------------  Physical Exam:   General: Intubated, multiple lines gtt  Neurology: on/off sedation  Eyes: bilateral pupils equal and reactive   ENT/Neck: +ETT midline, Neck supple, trachea midline, No JVD   Respiratory: rhonchi bilaterally   CV: S1S2, no murmurs        [x] Sinus Tachycardia  Abdominal: Softly distended,+BS   Extremities: 1+ pedal edema noted, + peripheral pulses palpable, warm  Skin: No Rashes, Hematoma, Ecchymosis                                8.8    15.87 )-----------( 107      ( 15 Dec 2022 00:45 )             28.7     12-15    137  |  102  |  23  ----------------------------<  174<H>  4.3   |  23  |  1.63<H>    Ca    7.6<L>      15 Dec 2022 00:46  Phos  4.0     12-15  Mg     2.6     12-15    TPro  6.4  /  Alb  2.9<L>  /  TBili  1.0  /  DBili  x   /  AST  36  /  ALT  16  /  AlkPhos  116  12-15    LIVER FUNCTIONS - ( 15 Dec 2022 00:46 )  Alb: 2.9 g/dL / Pro: 6.4 g/dL / ALK PHOS: 116 U/L / ALT: 16 U/L / AST: 36 U/L / GGT: x           PT/INR - ( 15 Dec 2022 00:45 )   PT: 12.7 sec;   INR: 1.10 ratio         PTT - ( 15 Dec 2022 04:38 )  PTT:97.6 sec  ABG - ( 14 Dec 2022 23:57 )  pH, Arterial: 7.41  pH, Blood: x     /  pCO2: 34    /  pO2: 91    / HCO3: 22    / Base Excess: -2.6  /  SaO2: 97.6              ------------------------------------------------------------------------------------------------------------------------------  Assessment:  61 y/o male with PMH of CABG, DM, HTN, HLD presenting as transfer from [place] for c/f STEMI. PTA arrival received 325 aspirin, 600 plavix, and no heparin drip started. Around 1:30 am patient had multiple episodes of non-bloody diarrhea and emesis with associated abdominal pain and chest pain, unable to localize, non-radiating, with associated SOB and no associated palpitations or diaphoresis. No recent fevers/chills, cough, rashes, or changes in urination. Does report mild diffuse back pain; started 5 days ago in setting on injury while walking and lifting objects. Denies focal weakness, numbness/tingling, or LOC due does report mild dizziness.    acute respiratory distress/failure, intubated ,   cardiogenic shock s/p VA ECMO decannulated 12/8  CAD/ASHD/CABG, acute MI  cardiogenic shock  Hemodynamic instability  acute renal failure  encounter management IABP  encounter weaning ventilator  Leukocytosis   Thrombocytopenia  Anemia  acute pancreatitis        Plan:   ***Neuro***  CT head showed 4mm subdural hematoma 11/26: repeat CT head unchanged 12/01  Soft palate laceration, ENT scoped 12/8, stable, no acute intervention at this time  Patient sedated w/ low dose Precedex   Repeat Head CT 12/13 Similar minimal increased density along the right aspect of the posterior   falx and right tentorial leaf, consistent with minimal residual subdural   hemorrhage.  No parenchymal hemorrhage or brain edema.  Postoperative acute pain control with Tylenol    ***Cardiovascular***  Invasive hemodynamic monitoring, assess perfusion indices   At high risk for hemodynamic instability and cardiac arrhythmias.  Lactate 1.1, continue trending   Continue low dose Milrinone for now.  L Fem IABP 1:1 with good augmentation - weaning trial overnight .   Discussed with Dr. Rodrigues - GRZEGORZ reviewed with structural heart team for mitral clip evaluation.  IABP removal and mitral clip placement tomorrow morning   AC Therapy with Heparin gtt pAF/flutter PTT 44--> Holding, pending PTT   Amiodarone for rate control  c/w Statin           ***Pulmonary***  Acute respiratory failure  Wean ventilator as tolerated.  Plan for trach tomorrow morning           ***GI***  Protein calorie malnutrition - Tube feeds via nasal feeding tube; Make NPO for Procedures tomorrow   Pancreatitis   Protonix for stress ulcer prophylaxis   Bowel regimen with Miralax and Senna  Reglan for gut motility   Regular bowel movements       ***Renal***  ERIC, renal following, continue CRRT  Volume removal as tolerated.  Replete electrolytes as indicated.      ***ID***  Febrile overnight   Empiric coverage with Vancomycin and Meropenem   Diflucan for prophiolaxia   Plan to CT to rule out infection   All Cultures have been negative     ***Endocrine***  Hx of DM2, continue glycemic control w/ ISS.      Patient requires continuous monitoring with bedside rhythm monitoring, pulse oximetry monitoring, and continuous central venous and arterial pressure monitoring; and intermittent blood gas analysis. Care plan discussed with the ICU care team.   Patient remained critical, at risk for life threatening decompensation.    I have spent 30 minutes providing critical care management to this patient.    By signing my name below, I, Abhijit Huber, attest that this documentation has been prepared under the direction and in the presence of Jade Rosas MD  Electronically signed: Brian Slaughter, 12-15-22 @ 07:53    I, Jade Rosas MD, personally performed the services described in this documentation. all medical record entries made by the scribe were at my direction and in my presence. I have reviewed the chart and agree that the record reflects my personal performance and is accurate and complete  Electronically signed: Jade Rosas MD Patient seen and examined at the bedside.    Remained critically ill on continuous ICU monitoring.      Brief Summary:  Cardiogenic shock s/p VA ECMO decannulated 12/8/22. Currently supported with an IABP.        24 Hour events:  - Trach planning possibly tomorrow   - IABP wean overnight, planning mitral clip and IABP removal tomorrow morning   - Clotting on CVVH circuit   - Febrile overnight   - Anticoagulation on hold         OBJECTIVE:  Vital Signs Last 24 Hrs  T(C): 37.2 (15 Dec 2022 04:00), Max: 38 (14 Dec 2022 20:00)  T(F): 99 (15 Dec 2022 04:00), Max: 100.4 (14 Dec 2022 20:00)  HR: 105 (15 Dec 2022 06:20) (96 - 118)  BP: --  BP(mean): --  RR: 23 (15 Dec 2022 06:00) (12 - 35)  SpO2: 100% (15 Dec 2022 06:20) (94% - 100%)    Parameters below as of 15 Dec 2022 05:30  Patient On (Oxygen Delivery Method): ventilator        Mode: CPAP with PS  FiO2: 40  PEEP: 5  PS: 5  MAP: 8  PIP: 16              -------------------------------------------------------------------------------------------------------------------------------  Physical Exam:   General: Intubated, multiple lines gtt  Neurology: on/off sedation  Eyes: bilateral pupils equal and reactive   ENT/Neck: +ETT midline, Neck supple, trachea midline, No JVD   Respiratory: rhonchi bilaterally   CV: S1S2, no murmurs        [x] Sinus Tachycardia  Abdominal: Softly distended,+BS   Extremities: 1+ pedal edema noted, + peripheral pulses palpable, warm  Skin: No Rashes, Hematoma, Ecchymosis                                8.8    15.87 )-----------( 107      ( 15 Dec 2022 00:45 )             28.7     12-15    137  |  102  |  23  ----------------------------<  174<H>  4.3   |  23  |  1.63<H>    Ca    7.6<L>      15 Dec 2022 00:46  Phos  4.0     12-15  Mg     2.6     12-15    TPro  6.4  /  Alb  2.9<L>  /  TBili  1.0  /  DBili  x   /  AST  36  /  ALT  16  /  AlkPhos  116  12-15    LIVER FUNCTIONS - ( 15 Dec 2022 00:46 )  Alb: 2.9 g/dL / Pro: 6.4 g/dL / ALK PHOS: 116 U/L / ALT: 16 U/L / AST: 36 U/L / GGT: x           PT/INR - ( 15 Dec 2022 00:45 )   PT: 12.7 sec;   INR: 1.10 ratio         PTT - ( 15 Dec 2022 04:38 )  PTT:97.6 sec  ABG - ( 14 Dec 2022 23:57 )  pH, Arterial: 7.41  pH, Blood: x     /  pCO2: 34    /  pO2: 91    / HCO3: 22    / Base Excess: -2.6  /  SaO2: 97.6              ------------------------------------------------------------------------------------------------------------------------------  Assessment:  61 y/o male with PMH of CABG, DM, HTN, HLD presenting as transfer from [place] for c/f STEMI. PTA arrival received 325 aspirin, 600 plavix, and no heparin drip started. Around 1:30 am patient had multiple episodes of non-bloody diarrhea and emesis with associated abdominal pain and chest pain, unable to localize, non-radiating, with associated SOB and no associated palpitations or diaphoresis. No recent fevers/chills, cough, rashes, or changes in urination. Does report mild diffuse back pain; started 5 days ago in setting on injury while walking and lifting objects. Denies focal weakness, numbness/tingling, or LOC due does report mild dizziness.    acute respiratory distress/failure, intubated ,   cardiogenic shock s/p VA ECMO decannulated 12/8  CAD/ASHD/CABG, acute MI  cardiogenic shock  Hemodynamic instability  acute renal failure  encounter management IABP  encounter weaning ventilator  Leukocytosis   Thrombocytopenia  Anemia  acute pancreatitis        Plan:   ***Neuro***  CT head showed 4mm subdural hematoma 11/26: repeat CT head unchanged 12/01  Soft palate laceration, ENT scoped 12/8, stable, no acute intervention at this time  Patient sedated w/ low dose Precedex   Repeat Head CT 12/13 Similar minimal increased density along the right aspect of the posterior   falx and right tentorial leaf, consistent with minimal residual subdural   hemorrhage.  No parenchymal hemorrhage or brain edema.  Postoperative acute pain control with Tylenol    ***Cardiovascular***  Invasive hemodynamic monitoring, assess perfusion indices   At high risk for hemodynamic instability and cardiac arrhythmias.  Lactate 1.1, continue trending   Continue low dose Milrinone for now.  L Fem IABP 1:1 with good augmentation - weaning trial overnight .   Discussed with Dr. Rodrigues - GRZEGORZ reviewed with structural heart team for mitral clip evaluation.  IABP removal and mitral clip placement tomorrow morning   AC Therapy with Heparin gtt pAF/flutter PTT 44--> Holding, pending PTT   Amiodarone for rate control  c/w Statin           ***Pulmonary***  Acute respiratory failure  Wean ventilator as tolerated.  Plan for trach tomorrow morning           ***GI***  Protein calorie malnutrition - Tube feeds via nasal feeding tube; Make NPO for Procedures tomorrow   Pancreatitis   Protonix for stress ulcer prophylaxis   Bowel regimen with Miralax and Senna  Reglan for gut motility   Regular bowel movements       ***Renal***  ERIC, renal following, continue CRRT  Volume removal as tolerated.  Replete electrolytes as indicated.      ***ID***  Febrile overnight   Empiric coverage with Vancomycin and Meropenem   Diflucan for prophiolaxia   Plan to CT to rule out infection   All Cultures have been negative     ***Endocrine***  Hx of DM2, continue glycemic control w/ ISS.      Patient requires continuous monitoring with bedside rhythm monitoring, pulse oximetry monitoring, and continuous central venous and arterial pressure monitoring; and intermittent blood gas analysis. Care plan discussed with the ICU care team.   Patient remained critical, at risk for life threatening decompensation.    I have spent 30 minutes providing critical care management to this patient.    By signing my name below, I, Abhijit Huber, attest that this documentation has been prepared under the direction and in the presence of Jade Rosas MD  Electronically signed: Brian Slaughter, 12-15-22 @ 07:53    I, Jade Rosas MD, personally performed the services described in this documentation. all medical record entries made by the scribe were at my direction and in my presence. I have reviewed the chart and agree that the record reflects my personal performance and is accurate and complete  Electronically signed: Jade Rosas MD Patient seen and examined at the bedside.    Remained critically ill on continuous ICU monitoring.      Brief Summary:  Cardiogenic shock s/p VA ECMO decannulated 12/8/22. Currently supported with an IABP.        24 Hour events:  - Trach planning possibly tomorrow   - IABP wean overnight, planning mitral clip and IABP removal tomorrow morning   - Clotting on CVVH circuit   - Febrile overnight   - Anticoagulation on hold         OBJECTIVE:  Vital Signs Last 24 Hrs  T(C): 37.2 (15 Dec 2022 04:00), Max: 38 (14 Dec 2022 20:00)  T(F): 99 (15 Dec 2022 04:00), Max: 100.4 (14 Dec 2022 20:00)  HR: 105 (15 Dec 2022 06:20) (96 - 118)  BP: --  BP(mean): --  RR: 23 (15 Dec 2022 06:00) (12 - 35)  SpO2: 100% (15 Dec 2022 06:20) (94% - 100%)    Parameters below as of 15 Dec 2022 05:30  Patient On (Oxygen Delivery Method): ventilator        Mode: CPAP with PS  FiO2: 40  PEEP: 5  PS: 5  MAP: 8  PIP: 16              -------------------------------------------------------------------------------------------------------------------------------  Physical Exam:   General: Intubated, multiple lines gtt  Neurology: on/off sedation  Eyes: bilateral pupils equal and reactive   ENT/Neck: +ETT midline, Neck supple, trachea midline, No JVD   Respiratory: rhonchi bilaterally   CV: S1S2, no murmurs        [x] Sinus Tachycardia  Abdominal: Softly distended,+BS   Extremities: 1+ pedal edema noted, + peripheral pulses palpable, warm  Skin: No Rashes, Hematoma, Ecchymosis                                8.8    15.87 )-----------( 107      ( 15 Dec 2022 00:45 )             28.7     12-15    137  |  102  |  23  ----------------------------<  174<H>  4.3   |  23  |  1.63<H>    Ca    7.6<L>      15 Dec 2022 00:46  Phos  4.0     12-15  Mg     2.6     12-15    TPro  6.4  /  Alb  2.9<L>  /  TBili  1.0  /  DBili  x   /  AST  36  /  ALT  16  /  AlkPhos  116  12-15    LIVER FUNCTIONS - ( 15 Dec 2022 00:46 )  Alb: 2.9 g/dL / Pro: 6.4 g/dL / ALK PHOS: 116 U/L / ALT: 16 U/L / AST: 36 U/L / GGT: x           PT/INR - ( 15 Dec 2022 00:45 )   PT: 12.7 sec;   INR: 1.10 ratio         PTT - ( 15 Dec 2022 04:38 )  PTT:97.6 sec  ABG - ( 14 Dec 2022 23:57 )  pH, Arterial: 7.41  pH, Blood: x     /  pCO2: 34    /  pO2: 91    / HCO3: 22    / Base Excess: -2.6  /  SaO2: 97.6              ------------------------------------------------------------------------------------------------------------------------------  Assessment:  61 y/o male with PMH of CABG, DM, HTN, HLD presenting as transfer from [place] for c/f STEMI. PTA arrival received 325 aspirin, 600 plavix, and no heparin drip started. Around 1:30 am patient had multiple episodes of non-bloody diarrhea and emesis with associated abdominal pain and chest pain, unable to localize, non-radiating, with associated SOB and no associated palpitations or diaphoresis. No recent fevers/chills, cough, rashes, or changes in urination. Does report mild diffuse back pain; started 5 days ago in setting on injury while walking and lifting objects. Denies focal weakness, numbness/tingling, or LOC due does report mild dizziness.    acute respiratory distress/failure, intubated ,   cardiogenic shock s/p VA ECMO decannulated 12/8  CAD/ASHD/CABG, acute MI  cardiogenic shock  Hemodynamic instability  acute renal failure  encounter management IABP  encounter weaning ventilator  Leukocytosis   Thrombocytopenia  Anemia  acute pancreatitis        Plan:   ***Neuro***  CT head showed 4mm subdural hematoma 11/26: repeat CT head unchanged 12/01  Soft palate laceration, ENT scoped 12/8, stable, no acute intervention at this time  Patient sedated w/ low dose Precedex   Repeat Head CT 12/13 Similar minimal increased density along the right aspect of the posterior   falx and right tentorial leaf, consistent with minimal residual subdural   hemorrhage.  No parenchymal hemorrhage or brain edema.  Postoperative acute pain control with Tylenol    ***Cardiovascular***  Invasive hemodynamic monitoring, assess perfusion indices   At high risk for hemodynamic instability and cardiac arrhythmias.  Lactate 1.1, continue trending   Continue low dose Milrinone for now.  L Fem IABP 1:1 with good augmentation - weaning trial overnight .   Discussed with Dr. Rodrigues - GRZEGORZ reviewed with structural heart team for mitral clip evaluation.  IABP removal and mitral clip placement tomorrow morning   AC Therapy with Heparin gtt pAF/flutter PTT 44--> Holding, pending PTT   Amiodarone for rate control  c/w Statin           ***Pulmonary***  Acute respiratory failure  Wean ventilator as tolerated.  Plan for trach tomorrow morning           ***GI***  Protein calorie malnutrition - Tube feeds via nasal feeding tube; Make NPO for Procedures tomorrow   Pancreatitis   Protonix for stress ulcer prophylaxis   Bowel regimen with Miralax and Senna  Reglan for gut motility   Regular bowel movements       ***Renal***  ERIC, renal following, continue CRRT  Volume removal as tolerated.  Replete electrolytes as indicated.      ***ID***  Febrile overnight   Empiric coverage with Vancomycin and Meropenem   Diflucan for prophiolaxia   Plan to CT to rule out infection   All Cultures have been negative     ***Endocrine***  Hx of DM2, continue glycemic control w/ ISS.      Patient requires continuous monitoring with bedside rhythm monitoring, pulse oximetry monitoring, and continuous central venous and arterial pressure monitoring; and intermittent blood gas analysis. Care plan discussed with the ICU care team.   Patient remained critical, at risk for life threatening decompensation.    I have spent 30 minutes providing critical care management to this patient.    By signing my name below, I, Abhijit Huber, attest that this documentation has been prepared under the direction and in the presence of Jade Rosas MD  Electronically signed: Brian Slaughter, 12-15-22 @ 07:53    I, Jade Rosas MD, personally performed the services described in this documentation. all medical record entries made by the scribe were at my direction and in my presence. I have reviewed the chart and agree that the record reflects my personal performance and is accurate and complete  Electronically signed: Jade Rosas MD Patient seen and examined at the bedside.    Remained critically ill on continuous ICU monitoring.      Brief Summary:  Cardiogenic shock s/p VA ECMO decannulated 12/8/22. Currently supported with an IABP.        24 Hour events:  - Trach planning possibly tomorrow   - Structural heart team to perform mitral clip.  - IABP weaning ongoing.  - Clotting on CVVH circuit   - Febrile overnight   - Anticoagulation on hold         OBJECTIVE:  Vital Signs Last 24 Hrs  T(C): 37.2 (15 Dec 2022 04:00), Max: 38 (14 Dec 2022 20:00)  T(F): 99 (15 Dec 2022 04:00), Max: 100.4 (14 Dec 2022 20:00)  HR: 105 (15 Dec 2022 06:20) (96 - 118)  BP: --  BP(mean): --  RR: 23 (15 Dec 2022 06:00) (12 - 35)  SpO2: 100% (15 Dec 2022 06:20) (94% - 100%)    Parameters below as of 15 Dec 2022 05:30  Patient On (Oxygen Delivery Method): ventilator        Mode: CPAP with PS  FiO2: 40  PEEP: 5  PS: 5  MAP: 8  PIP: 16              -------------------------------------------------------------------------------------------------------------------------------  Physical Exam:   General: Intubated, multiple lines gtt  Neurology: Following commands  Eyes: bilateral pupils equal and reactive   ENT/Neck: +ETT midline, Neck supple, trachea midline, No JVD   Respiratory: rhonchi bilaterally   CV: S1S2, no murmurs        [x] Sinus Tachycardia  Abdominal: Softly distended,+BS   Extremities: 1+ pedal edema noted, + peripheral pulses palpable, warm  Skin: No Rashes, Hematoma, Ecchymosis                                8.8    15.87 )-----------( 107      ( 15 Dec 2022 00:45 )             28.7     12-15    137  |  102  |  23  ----------------------------<  174<H>  4.3   |  23  |  1.63<H>    Ca    7.6<L>      15 Dec 2022 00:46  Phos  4.0     12-15  Mg     2.6     12-15    TPro  6.4  /  Alb  2.9<L>  /  TBili  1.0  /  DBili  x   /  AST  36  /  ALT  16  /  AlkPhos  116  12-15    LIVER FUNCTIONS - ( 15 Dec 2022 00:46 )  Alb: 2.9 g/dL / Pro: 6.4 g/dL / ALK PHOS: 116 U/L / ALT: 16 U/L / AST: 36 U/L / GGT: x           PT/INR - ( 15 Dec 2022 00:45 )   PT: 12.7 sec;   INR: 1.10 ratio         PTT - ( 15 Dec 2022 04:38 )  PTT:97.6 sec  ABG - ( 14 Dec 2022 23:57 )  pH, Arterial: 7.41  pH, Blood: x     /  pCO2: 34    /  pO2: 91    / HCO3: 22    / Base Excess: -2.6  /  SaO2: 97.6              ------------------------------------------------------------------------------------------------------------------------------  Assessment:  61 y/o male with Pancreatitis and Cardiogenic shock    Acute respiratory distress/failure, intubated ,   Cardiogenic shock s/p VA ECMO decannulated 12/8  CAD/ASHD/CABG, acute MI  At high risk for hemodynamic instability  Acute kidney injury  Encounter management IABP  Encounter weaning ventilator  Leukocytosis   Anemia  Acute pancreatitis        Plan:   ***Neuro***  CT head showed 4mm subdural hematoma 11/26: repeat CT head unchanged 12/01  Soft palate laceration, ENT scoped 12/8, stable, no acute intervention at this time  Repeat Head CT 12/13 Similar minimal increased density along the right aspect of the posterior   falx and right tentorial leaf, consistent with minimal residual subdural   hemorrhage.  No parenchymal hemorrhage or brain edema.  Patient sedated w/ low dose Precedex   Acute pain control with Tylenol    ***Cardiovascular***  Invasive hemodynamic monitoring, assess perfusion indices   At high risk for hemodynamic instability and cardiac arrhythmias.  Lactate 1.1, continue trending   Continue low dose Milrinone for now.  Mitral regurgitation - plan for Mitral clip today or tomorrow.  L Fem IABP 1:1 with good augmentation - weaning trial likely after Mitral clip.  AC Therapy with Heparin gtt pAF/flutter   Amiodarone for rate control  c/w Statin         ***Pulmonary***  Acute respiratory failure  Wean ventilator as tolerated.  Plan for trach tomorrow morning       ***GI***  Protein calorie malnutrition - Tube feeds via nasal feeding tube  Pancreatitis   Protonix for stress ulcer prophylaxis   Bowel regimen with Miralax and Senna  Reglan for gut motility       ***Renal***  ERIC, renal following, continue CRRT  Volume removal as tolerated.  Replete electrolytes as indicated.      ***ID***  Recent fevers.  Vanco, Merrem, Diflucan - Following procedure can likely taper antibiotics to off if cultures remain negative.      ***Endocrine***  Hx of DM2, continue glycemic control w/ ISS.      Patient requires continuous monitoring with bedside rhythm monitoring, pulse oximetry monitoring, and continuous central venous and arterial pressure monitoring; and intermittent blood gas analysis. Care plan discussed with the ICU care team.   Patient remained critical, at risk for life threatening decompensation.    I have spent 38 minutes providing critical care management to this patient.    By signing my name below, I, Abhijit Ngo, attest that this documentation has been prepared under the direction and in the presence of Jade Rosas MD  Electronically signed: Brian Slaughter, 12-15-22 @ 07:53    I, Jade Rosas MD, personally performed the services described in this documentation. all medical record entries made by the scribe were at my direction and in my presence. I have reviewed the chart and agree that the record reflects my personal performance and is accurate and complete  Electronically signed: Jade Rosas MD Patient seen and examined at the bedside.    Remained critically ill on continuous ICU monitoring.      Brief Summary:  Cardiogenic shock s/p VA ECMO decannulated 12/8/22. Currently supported with an IABP.        24 Hour events:  - Trach planning possibly tomorrow   - Structural heart team to perform mitral clip.  - IABP weaning ongoing.  - Clotting on CVVH circuit   - Febrile overnight   - Anticoagulation on hold         OBJECTIVE:  Vital Signs Last 24 Hrs  T(C): 37.2 (15 Dec 2022 04:00), Max: 38 (14 Dec 2022 20:00)  T(F): 99 (15 Dec 2022 04:00), Max: 100.4 (14 Dec 2022 20:00)  HR: 105 (15 Dec 2022 06:20) (96 - 118)  BP: --  BP(mean): --  RR: 23 (15 Dec 2022 06:00) (12 - 35)  SpO2: 100% (15 Dec 2022 06:20) (94% - 100%)    Parameters below as of 15 Dec 2022 05:30  Patient On (Oxygen Delivery Method): ventilator        Mode: CPAP with PS  FiO2: 40  PEEP: 5  PS: 5  MAP: 8  PIP: 16              -------------------------------------------------------------------------------------------------------------------------------  Physical Exam:   General: Intubated, multiple lines gtt  Neurology: Following commands  Eyes: bilateral pupils equal and reactive   ENT/Neck: +ETT midline, Neck supple, trachea midline, No JVD   Respiratory: rhonchi bilaterally   CV: S1S2, no murmurs        [x] Sinus Tachycardia  Abdominal: Softly distended,+BS   Extremities: 1+ pedal edema noted, + peripheral pulses palpable, warm  Skin: No Rashes, Hematoma, Ecchymosis                                8.8    15.87 )-----------( 107      ( 15 Dec 2022 00:45 )             28.7     12-15    137  |  102  |  23  ----------------------------<  174<H>  4.3   |  23  |  1.63<H>    Ca    7.6<L>      15 Dec 2022 00:46  Phos  4.0     12-15  Mg     2.6     12-15    TPro  6.4  /  Alb  2.9<L>  /  TBili  1.0  /  DBili  x   /  AST  36  /  ALT  16  /  AlkPhos  116  12-15    LIVER FUNCTIONS - ( 15 Dec 2022 00:46 )  Alb: 2.9 g/dL / Pro: 6.4 g/dL / ALK PHOS: 116 U/L / ALT: 16 U/L / AST: 36 U/L / GGT: x           PT/INR - ( 15 Dec 2022 00:45 )   PT: 12.7 sec;   INR: 1.10 ratio         PTT - ( 15 Dec 2022 04:38 )  PTT:97.6 sec  ABG - ( 14 Dec 2022 23:57 )  pH, Arterial: 7.41  pH, Blood: x     /  pCO2: 34    /  pO2: 91    / HCO3: 22    / Base Excess: -2.6  /  SaO2: 97.6              ------------------------------------------------------------------------------------------------------------------------------  Assessment:  63 y/o male with Pancreatitis and Cardiogenic shock    Acute respiratory distress/failure, intubated ,   Cardiogenic shock s/p VA ECMO decannulated 12/8  CAD/ASHD/CABG, acute MI  At high risk for hemodynamic instability  Acute kidney injury  Encounter management IABP  Encounter weaning ventilator  Leukocytosis   Anemia  Acute pancreatitis        Plan:   ***Neuro***  CT head showed 4mm subdural hematoma 11/26: repeat CT head unchanged 12/01  Soft palate laceration, ENT scoped 12/8, stable, no acute intervention at this time  Repeat Head CT 12/13 Similar minimal increased density along the right aspect of the posterior   falx and right tentorial leaf, consistent with minimal residual subdural   hemorrhage.  No parenchymal hemorrhage or brain edema.  Patient sedated w/ low dose Precedex   Acute pain control with Tylenol    ***Cardiovascular***  Invasive hemodynamic monitoring, assess perfusion indices   At high risk for hemodynamic instability and cardiac arrhythmias.  Lactate 1.1, continue trending   Continue low dose Milrinone for now.  Mitral regurgitation - plan for Mitral clip today or tomorrow.  L Fem IABP 1:1 with good augmentation - weaning trial likely after Mitral clip.  AC Therapy with Heparin gtt pAF/flutter   Amiodarone for rate control  c/w Statin         ***Pulmonary***  Acute respiratory failure  Wean ventilator as tolerated.  Plan for trach tomorrow morning       ***GI***  Protein calorie malnutrition - Tube feeds via nasal feeding tube  Pancreatitis   Protonix for stress ulcer prophylaxis   Bowel regimen with Miralax and Senna  Reglan for gut motility       ***Renal***  ERIC, renal following, continue CRRT  Volume removal as tolerated.  Replete electrolytes as indicated.      ***ID***  Recent fevers.  Vanco, Merrem, Diflucan - Following procedure can likely taper antibiotics to off if cultures remain negative.      ***Endocrine***  Hx of DM2, continue glycemic control w/ ISS.      Patient requires continuous monitoring with bedside rhythm monitoring, pulse oximetry monitoring, and continuous central venous and arterial pressure monitoring; and intermittent blood gas analysis. Care plan discussed with the ICU care team.   Patient remained critical, at risk for life threatening decompensation.    I have spent 38 minutes providing critical care management to this patient.    By signing my name below, I, Abhijit Ngo, attest that this documentation has been prepared under the direction and in the presence of Jade Rosas MD  Electronically signed: Brian Slaughter, 12-15-22 @ 07:53    I, Jade Rosas MD, personally performed the services described in this documentation. all medical record entries made by the scribe were at my direction and in my presence. I have reviewed the chart and agree that the record reflects my personal performance and is accurate and complete  Electronically signed: Jade Rosas MD

## 2022-12-15 NOTE — CONSULT NOTE ADULT - PROBLEM SELECTOR RECOMMENDATION 9
- plan for emergent mitral MONROE tomorrow morning in OR 23  -- as per CTU, he will have a trach placed later that day as well

## 2022-12-15 NOTE — PROGRESS NOTE ADULT - PROBLEM SELECTOR PLAN 1
Pt with non oliguric ERIC/ ATN in the setting of STEMI, cardiac arrest & cardiogenic shock  SCr on arrival was 2.15; trended down to hector 1.6 on 11/25;  slowly trended up & increased to 3.95 11/28.     Pt received IV diuretics. SCr increased to 4.19 with BUN of 101 on 12/5/22. Pt with significant volume overload. Pt initiated on CRRT/CVVHDF to optimize volume and electrolytes on 12/5/22. BP being maintained on vasopressors. Pt likely with ATN. Pt underwent decannulation of ECMO on 12/8/22 and was taken off CRRT. Pt reinitiated on CRRT overnight on 12/9/22 for volume overload. Plan is to continue CRRT.. Monitor labs and urine output. Avoid any potential nephrotoxins. Dose medications as per eGFR.

## 2022-12-15 NOTE — PROGRESS NOTE ADULT - SUBJECTIVE AND OBJECTIVE BOX
Patient seen and examined at the bedside.    Remained critically ill on continuous ICU monitoring.    OBJECTIVE:  Vital Signs Last 24 Hrs  T(C): 37 (15 Dec 2022 20:00), Max: 37.2 (15 Dec 2022 00:00)  T(F): 98.6 (15 Dec 2022 20:00), Max: 99 (15 Dec 2022 00:00)  HR: 98 (15 Dec 2022 20:00) (93 - 118)  BP: --  BP(mean): --  RR: 27 (15 Dec 2022 20:00) (12 - 35)  SpO2: 100% (15 Dec 2022 20:00) (94% - 100%)    Parameters below as of 15 Dec 2022 20:00  Patient On (Oxygen Delivery Method): ventilator    O2 Concentration (%): 40      Physical Exam:   General: Intubated, multiple lines gtt  Neurology: Sedated  Eyes: bilateral pupils equal and reactive   ENT/Neck: +ETT midline, Neck supple, trachea midline, No JVD   Respiratory: rhonchi bilaterally   CV: S1S2, no murmurs        [x] Sinus Rhythm   Abdominal: Softly distended,+BS   Extremities: 1+ pedal edema noted, + peripheral pulses palpable, warm  Skin: No Rashes, Hematoma, Ecchymosis      ============================I/O===========================   I&O's Detail    14 Dec 2022 07:01  -  15 Dec 2022 07:00  --------------------------------------------------------  IN:    Dexmedetomidine: 150.8 mL    Enteral Tube Flush: 185 mL    Glucerna: 1560 mL    Heparin: 368 mL    IV PiggyBack: 300 mL    Milrinone: 67.2 mL    sodium chloride 0.9%: 120 mL    Vasopressin: 5 mL  Total IN: 2756 mL    OUT:    Indwelling Catheter - Urethral (mL): 75 mL    Other (mL): 3715 mL  Total OUT: 3790 mL    Total NET: -1034 mL      15 Dec 2022 07:01  -  15 Dec 2022 20:27  --------------------------------------------------------  IN:    Dexmedetomidine: 119.6 mL    Enteral Tube Flush: 60 mL    Glucerna: 845 mL    Heparin: 97 mL    IV PiggyBack: 200 mL    Milrinone: 36.4 mL    sodium chloride 0.9%: 65 mL  Total IN: 1423 mL    OUT:    Indwelling Catheter - Urethral (mL): 20 mL    Other (mL): 4107 mL    Vasopressin: 0 mL  Total OUT: 4127 mL    Total NET: -2704 mL  ============================ LABS =========================                        8.8    15.87 )-----------( 107      ( 15 Dec 2022 00:45 )             28.7     12-15    137  |  102  |  23  ----------------------------<  174<H>  4.3   |  23  |  1.63<H>    Ca    7.6<L>      15 Dec 2022 00:46  Phos  4.0     12-15  Mg     2.6     12-15    TPro  6.4  /  Alb  2.9<L>  /  TBili  1.0  /  DBili  x   /  AST  36  /  ALT  16  /  AlkPhos  116  12-15    LIVER FUNCTIONS - ( 15 Dec 2022 00:46 )  Alb: 2.9 g/dL / Pro: 6.4 g/dL / ALK PHOS: 116 U/L / ALT: 16 U/L / AST: 36 U/L / GGT: x           PT/INR - ( 15 Dec 2022 00:45 )   PT: 12.7 sec;   INR: 1.10 ratio         PTT - ( 15 Dec 2022 17:07 )  PTT:50.5 sec  ABG - ( 15 Dec 2022 10:53 )  pH, Arterial: 7.42  pH, Blood: x     /  pCO2: 35    /  pO2: 93    / HCO3: 23    / Base Excess: -1.4  /  SaO2: 97.9    ======================Micro/Rad/Cardio=================  Culture: Reviewed   CXR: Reviewed  Echo: Reviewed  ======================================================  PAST MEDICAL & SURGICAL HISTORY:      ======================================================  Assessment:  63 y/o male with PMH of CABG, DM, HTN, HLD presenting as transfer from [place] for c/f STEMI. PTA arrival received 325 aspirin, 600 plavix, and no heparin drip started. Around 1:30 am patient had multiple episodes of non-bloody diarrhea and emesis with associated abdominal pain and chest pain, unable to localize, non-radiating, with associated SOB and no associated palpitations or diaphoresis. No recent fevers/chills, cough, rashes, or changes in urination. Does report mild diffuse back pain; started 5 days ago in setting on injury while walking and lifting objects. Denies focal weakness, numbness/tingling, or LOC due does report mild dizziness.    acute respiratory distress/failure, intubated ,   cardiogenic shock s/p VA ECMO decannulated 12/8  CAD/ASHD/CABG, acute MI  cardiogenic shock  Hemodynamic instability  acute renal failure  encounter management IABP  encounter weaning ventilator  Leukocytosis   Thrombocytopenia  Anemia  acute pancreatitis  ======================================================  Plan:   ***Neuro***  CT head showed 4mm subdural hematoma 11/26: repeat CT head unchanged 12/01,  [x] Sedated with [x] Precedex for vent synchrony  Post operative neuro assessment   Soft palate laceration, ENT scoped 12/8, stable, no acute intervention at this time  Repeat Head CT 12/13 Similar minimal increased density along the right aspect of the posterior   falx and right tentorial leaf, consistent with minimal residual subdural   hemorrhage.    ***Cardiovascular***  12/8 TTE EF 20-25%, Severe global left ventricular systolic dysfunction.  12/8 GRZEGORZ EF 40-45%, MR, TR, normal RV function  12/7 TTE turndown EF 30-40%, normal RV, MR mod   Invasive hemodynamic monitoring, assess perfusion indices   SR / CVP 10/ MAP 74/ Hct 28.7/ Lactate 0.9  [x] Primacor- 0.1 mcgs/kg/min [x] Vasopressin 0.02 Units  [x] 12/7 cath patent LIMA graft and other grafts occluded, femoral IABP placed, with good augmentation @ 1:1,  [x] 12/8 ECMO d/c'd  PO Amiodarone for afib prophylaxis   Continuos reassessment of hemodynamics   12/8 Aflutter s/p DCCV x2  [x] AC Therapy with Heparin gtt PTT 44  [x] Statin   11/29 HIT NG, 12/8 MARY Neg  Remains on primacor    ***Pulmonary***  Post op vent management   Titration of FiO2 and PEEP, follow SpO2, CXR, blood gasses   CPAP trials as tolerated      Mode: AC/ CMV (Assist Control/ Continuous Mandatory Ventilation)  RR (machine): 14  FiO2: 40  PEEP: 5  ITime: 1  MAP: 10  PC: 12  PIP: 19              ***GI***  [x] NPO with TF's Glucerna 1.5 @ goal of 65cc/hr   [x] Protonix    Bowel regimen with Miralax and senna  Reglan for gut motility    ***Renal***  ERIC, renal following CVVHD started 12/5  Continue to monitor I/Os, BUN/Creatinine.   Replete lytes PRN  De Dios present  CRRT Treatment    ***ID***  Empiric coverage with Vancomycin and Meropenem   Diflucan for prophylaxis   Plan to CT to rule out infection     ***Endocrine***  [x]  DM2: HbA1c 7.3%                - [x] ISS             - Need tight glycemic control to prevent wound infection.        Patient requires continuous monitoring with:  bedside rhythm monitoring, arterial line, pulse oximetry, ventilator management and monitoring; intermittent blood gas analysis.  Care plan discussed with ICU care team.  patient remain critical; required more than usual post op care; I have spent 50 minutes providing non routine post op care, revaluated multiple times during the day .       Care Plan discussed with the ICU care team. Patient remained critical, at risk for life threatening decompensation.     By signing my name below, I, Erin Palafox, attest that this documentation has been prepared under the direction and in the presence of Yusef Millan MD.  Electronically signed: Brian Hunt, 12-15-22 @ 20:27    I, Monty Holbrook, personally performed the services described in this documentation. all medical record entries made by the scribe were at my direction and in my presence. I have reviewed the chart and agree that the record reflects my personal performance and is accurate and complete  Electronically signed: Yusef Millan MD. Patient seen and examined at the bedside.    Remained critically ill on continuous ICU monitoring.    OBJECTIVE:  Vital Signs Last 24 Hrs  T(C): 37 (15 Dec 2022 20:00), Max: 37.2 (15 Dec 2022 00:00)  T(F): 98.6 (15 Dec 2022 20:00), Max: 99 (15 Dec 2022 00:00)  HR: 98 (15 Dec 2022 20:00) (93 - 118)  BP: --  BP(mean): --  RR: 27 (15 Dec 2022 20:00) (12 - 35)  SpO2: 100% (15 Dec 2022 20:00) (94% - 100%)    Parameters below as of 15 Dec 2022 20:00  Patient On (Oxygen Delivery Method): ventilator    O2 Concentration (%): 40      Physical Exam:   General: Intubated, multiple lines gtt  Neurology: Sedated  Eyes: bilateral pupils equal and reactive   ENT/Neck: +ETT midline, Neck supple, trachea midline, No JVD   Respiratory: rhonchi bilaterally   CV: S1S2, no murmurs        [x] Sinus Rhythm   Abdominal: Softly distended,+BS   Extremities: 1+ pedal edema noted, + peripheral pulses palpable, warm  Skin: No Rashes, Hematoma, Ecchymosis      ============================I/O===========================   I&O's Detail    14 Dec 2022 07:01  -  15 Dec 2022 07:00  --------------------------------------------------------  IN:    Dexmedetomidine: 150.8 mL    Enteral Tube Flush: 185 mL    Glucerna: 1560 mL    Heparin: 368 mL    IV PiggyBack: 300 mL    Milrinone: 67.2 mL    sodium chloride 0.9%: 120 mL    Vasopressin: 5 mL  Total IN: 2756 mL    OUT:    Indwelling Catheter - Urethral (mL): 75 mL    Other (mL): 3715 mL  Total OUT: 3790 mL    Total NET: -1034 mL      15 Dec 2022 07:01  -  15 Dec 2022 20:27  --------------------------------------------------------  IN:    Dexmedetomidine: 119.6 mL    Enteral Tube Flush: 60 mL    Glucerna: 845 mL    Heparin: 97 mL    IV PiggyBack: 200 mL    Milrinone: 36.4 mL    sodium chloride 0.9%: 65 mL  Total IN: 1423 mL    OUT:    Indwelling Catheter - Urethral (mL): 20 mL    Other (mL): 4107 mL    Vasopressin: 0 mL  Total OUT: 4127 mL    Total NET: -2704 mL  ============================ LABS =========================                        8.8    15.87 )-----------( 107      ( 15 Dec 2022 00:45 )             28.7     12-15    137  |  102  |  23  ----------------------------<  174<H>  4.3   |  23  |  1.63<H>    Ca    7.6<L>      15 Dec 2022 00:46  Phos  4.0     12-15  Mg     2.6     12-15    TPro  6.4  /  Alb  2.9<L>  /  TBili  1.0  /  DBili  x   /  AST  36  /  ALT  16  /  AlkPhos  116  12-15    LIVER FUNCTIONS - ( 15 Dec 2022 00:46 )  Alb: 2.9 g/dL / Pro: 6.4 g/dL / ALK PHOS: 116 U/L / ALT: 16 U/L / AST: 36 U/L / GGT: x           PT/INR - ( 15 Dec 2022 00:45 )   PT: 12.7 sec;   INR: 1.10 ratio         PTT - ( 15 Dec 2022 17:07 )  PTT:50.5 sec  ABG - ( 15 Dec 2022 10:53 )  pH, Arterial: 7.42  pH, Blood: x     /  pCO2: 35    /  pO2: 93    / HCO3: 23    / Base Excess: -1.4  /  SaO2: 97.9    ======================Micro/Rad/Cardio=================  Culture: Reviewed   CXR: Reviewed  Echo: Reviewed  ======================================================  PAST MEDICAL & SURGICAL HISTORY:      ======================================================  Assessment:  63 y/o male with PMH of CABG, DM, HTN, HLD presenting as transfer from [place] for c/f STEMI. PTA arrival received 325 aspirin, 600 plavix, and no heparin drip started. Around 1:30 am patient had multiple episodes of non-bloody diarrhea and emesis with associated abdominal pain and chest pain, unable to localize, non-radiating, with associated SOB and no associated palpitations or diaphoresis. No recent fevers/chills, cough, rashes, or changes in urination. Does report mild diffuse back pain; started 5 days ago in setting on injury while walking and lifting objects. Denies focal weakness, numbness/tingling, or LOC due does report mild dizziness.    acute respiratory distress/failure, intubated ,   cardiogenic shock s/p VA ECMO decannulated 12/8  CAD/ASHD/CABG, acute MI  cardiogenic shock  Hemodynamic instability  acute renal failure  encounter management IABP  encounter weaning ventilator  Leukocytosis   Thrombocytopenia  Anemia  acute pancreatitis  ======================================================  Plan:   ***Neuro***  CT head showed 4mm subdural hematoma 11/26: repeat CT head unchanged 12/01,  [x] Sedated with [x] Precedex for vent synchrony  Post operative neuro assessment   Soft palate laceration, ENT scoped 12/8, stable, no acute intervention at this time  Repeat Head CT 12/13 Similar minimal increased density along the right aspect of the posterior   falx and right tentorial leaf, consistent with minimal residual subdural   hemorrhage.    ***Cardiovascular***  12/8 TTE EF 20-25%, Severe global left ventricular systolic dysfunction.  12/8 GRZEGORZ EF 40-45%, MR, TR, normal RV function  12/7 TTE turndown EF 30-40%, normal RV, MR mod   Invasive hemodynamic monitoring, assess perfusion indices   SR / CVP 10/ MAP 74/ Hct 28.7/ Lactate 0.9  [x] Primacor- 0.1 mcgs/kg/min [x] Vasopressin 0.02 Units  [x] 12/7 cath patent LIMA graft and other grafts occluded, femoral IABP placed, with good augmentation @ 1:1,  [x] 12/8 ECMO d/c'd  PO Amiodarone for afib prophylaxis   Continuos reassessment of hemodynamics   12/8 Aflutter s/p DCCV x2  [x] AC Therapy with Heparin gtt PTT 44  [x] Statin   11/29 HIT NG, 12/8 MARY Neg  Remains on primacor    ***Pulmonary***  Post op vent management   Titration of FiO2 and PEEP, follow SpO2, CXR, blood gasses   CPAP trials as tolerated      Mode: AC/ CMV (Assist Control/ Continuous Mandatory Ventilation)  RR (machine): 14  FiO2: 40  PEEP: 5  ITime: 1  MAP: 10  PC: 12  PIP: 19              ***GI***  [x] NPO with TF's Glucerna 1.5 @ goal of 65cc/hr   [x] Protonix    Bowel regimen with Miralax and senna  Reglan for gut motility    ***Renal***  ERIC, renal following CVVHD started 12/5  Continue to monitor I/Os, BUN/Creatinine.   Replete lytes PRN  De Dios present  CRRT Treatment    ***ID***  Empiric coverage with Vancomycin and Meropenem   Diflucan for prophylaxis   Plan to CT to rule out infection     ***Endocrine***  [x]  DM2: HbA1c 7.3%                - [x] ISS             - Need tight glycemic control to prevent wound infection.        Patient requires continuous monitoring with:  bedside rhythm monitoring, arterial line, pulse oximetry, ventilator management and monitoring; intermittent blood gas analysis.  Care plan discussed with ICU care team.  patient remain critical; required more than usual post op care; I have spent 50 minutes providing non routine post op care, revaluated multiple times during the day .       Care Plan discussed with the ICU care team. Patient remained critical, at risk for life threatening decompensation.     By signing my name below, I, Erin Palafox, attest that this documentation has been prepared under the direction and in the presence of Yusef Millan MD.  Electronically signed: Brian Hunt, 12-15-22 @ 20:27    I, Monty Holbrook, personally performed the services described in this documentation. all medical record entries made by the scribe were at my direction and in my presence. I have reviewed the chart and agree that the record reflects my personal performance and is accurate and complete  Electronically signed: Yusef Millan MD. Patient seen and examined at the bedside.    Remained critically ill on continuous ICU monitoring.    OBJECTIVE:  Vital Signs Last 24 Hrs  T(C): 37 (15 Dec 2022 20:00), Max: 37.2 (15 Dec 2022 00:00)  T(F): 98.6 (15 Dec 2022 20:00), Max: 99 (15 Dec 2022 00:00)  HR: 98 (15 Dec 2022 20:00) (93 - 118)  BP: --  BP(mean): --  RR: 27 (15 Dec 2022 20:00) (12 - 35)  SpO2: 100% (15 Dec 2022 20:00) (94% - 100%)    Parameters below as of 15 Dec 2022 20:00  Patient On (Oxygen Delivery Method): ventilator    O2 Concentration (%): 40      Physical Exam:   General: Intubated, multiple lines gtt  Neurology: Sedated  Eyes: bilateral pupils equal and reactive   ENT/Neck: +ETT midline, Neck supple, trachea midline, No JVD   Respiratory: rhonchi bilaterally   CV: S1S2, no murmurs        [x] Sinus Rhythm   Abdominal: Softly distended,+BS   Extremities: 1+ pedal edema noted, + peripheral pulses palpable, warm  Skin: No Rashes, Hematoma, Ecchymosis      ============================I/O===========================   I&O's Detail    14 Dec 2022 07:01  -  15 Dec 2022 07:00  --------------------------------------------------------  IN:    Dexmedetomidine: 150.8 mL    Enteral Tube Flush: 185 mL    Glucerna: 1560 mL    Heparin: 368 mL    IV PiggyBack: 300 mL    Milrinone: 67.2 mL    sodium chloride 0.9%: 120 mL    Vasopressin: 5 mL  Total IN: 2756 mL    OUT:    Indwelling Catheter - Urethral (mL): 75 mL    Other (mL): 3715 mL  Total OUT: 3790 mL    Total NET: -1034 mL      15 Dec 2022 07:01  -  15 Dec 2022 20:27  --------------------------------------------------------  IN:    Dexmedetomidine: 119.6 mL    Enteral Tube Flush: 60 mL    Glucerna: 845 mL    Heparin: 97 mL    IV PiggyBack: 200 mL    Milrinone: 36.4 mL    sodium chloride 0.9%: 65 mL  Total IN: 1423 mL    OUT:    Indwelling Catheter - Urethral (mL): 20 mL    Other (mL): 4107 mL    Vasopressin: 0 mL  Total OUT: 4127 mL    Total NET: -2704 mL  ============================ LABS =========================                        8.8    15.87 )-----------( 107      ( 15 Dec 2022 00:45 )             28.7     12-15    137  |  102  |  23  ----------------------------<  174<H>  4.3   |  23  |  1.63<H>    Ca    7.6<L>      15 Dec 2022 00:46  Phos  4.0     12-15  Mg     2.6     12-15    TPro  6.4  /  Alb  2.9<L>  /  TBili  1.0  /  DBili  x   /  AST  36  /  ALT  16  /  AlkPhos  116  12-15    LIVER FUNCTIONS - ( 15 Dec 2022 00:46 )  Alb: 2.9 g/dL / Pro: 6.4 g/dL / ALK PHOS: 116 U/L / ALT: 16 U/L / AST: 36 U/L / GGT: x           PT/INR - ( 15 Dec 2022 00:45 )   PT: 12.7 sec;   INR: 1.10 ratio         PTT - ( 15 Dec 2022 17:07 )  PTT:50.5 sec  ABG - ( 15 Dec 2022 10:53 )  pH, Arterial: 7.42  pH, Blood: x     /  pCO2: 35    /  pO2: 93    / HCO3: 23    / Base Excess: -1.4  /  SaO2: 97.9    ======================Micro/Rad/Cardio=================  Culture: Reviewed   CXR: Reviewed  Echo: Reviewed  ======================================================  PAST MEDICAL & SURGICAL HISTORY:      ======================================================  Assessment:  61 y/o male with PMH of CABG, DM, HTN, HLD presenting as transfer from [place] for c/f STEMI. PTA arrival received 325 aspirin, 600 plavix, and no heparin drip started. Around 1:30 am patient had multiple episodes of non-bloody diarrhea and emesis with associated abdominal pain and chest pain, unable to localize, non-radiating, with associated SOB and no associated palpitations or diaphoresis. No recent fevers/chills, cough, rashes, or changes in urination. Does report mild diffuse back pain; started 5 days ago in setting on injury while walking and lifting objects. Denies focal weakness, numbness/tingling, or LOC due does report mild dizziness.    acute respiratory distress/failure, intubated ,   cardiogenic shock s/p VA ECMO decannulated 12/8  CAD/ASHD/CABG, acute MI  cardiogenic shock  Hemodynamic instability  acute renal failure  encounter management IABP  encounter weaning ventilator  Leukocytosis   Thrombocytopenia  Anemia  acute pancreatitis  ======================================================  Plan:   ***Neuro***  CT head showed 4mm subdural hematoma 11/26: repeat CT head unchanged 12/01,  [x] Sedated with [x] Precedex for vent synchrony  Post operative neuro assessment   Soft palate laceration, ENT scoped 12/8, stable, no acute intervention at this time  Repeat Head CT 12/13 Similar minimal increased density along the right aspect of the posterior   falx and right tentorial leaf, consistent with minimal residual subdural   hemorrhage.    ***Cardiovascular***  12/8 TTE EF 20-25%, Severe global left ventricular systolic dysfunction.  12/8 GRZEGORZ EF 40-45%, MR, TR, normal RV function  12/7 TTE turndown EF 30-40%, normal RV, MR mod   Invasive hemodynamic monitoring, assess perfusion indices   SR / CVP 10/ MAP 74/ Hct 28.7/ Lactate 0.9  [x] Primacor- 0.1 mcgs/kg/min [x] Vasopressin 0.02 Units  [x] 12/7 cath patent LIMA graft and other grafts occluded, femoral IABP placed, with good augmentation @ 1:1,  [x] 12/8 ECMO d/c'd  PO Amiodarone for afib prophylaxis   Continuos reassessment of hemodynamics   12/8 Aflutter s/p DCCV x2  [x] AC Therapy with Heparin gtt PTT 44  [x] Statin   11/29 HIT NG, 12/8 MARY Neg  Remains on primacor    ***Pulmonary***  Post op vent management   Titration of FiO2 and PEEP, follow SpO2, CXR, blood gasses   CPAP trials as tolerated      Mode: AC/ CMV (Assist Control/ Continuous Mandatory Ventilation)  RR (machine): 14  FiO2: 40  PEEP: 5  ITime: 1  MAP: 10  PC: 12  PIP: 19              ***GI***  [x] NPO with TF's Glucerna 1.5 @ goal of 65cc/hr   [x] Protonix    Bowel regimen with Miralax and senna  Reglan for gut motility    ***Renal***  ERIC, renal following CVVHD started 12/5  Continue to monitor I/Os, BUN/Creatinine.   Replete lytes PRN  De Dios present  CRRT Treatment    ***ID***  Empiric coverage with Vancomycin and Meropenem   Diflucan for prophylaxis   Plan to CT to rule out infection     ***Endocrine***  [x]  DM2: HbA1c 7.3%                - [x] ISS             - Need tight glycemic control to prevent wound infection.        Patient requires continuous monitoring with:  bedside rhythm monitoring, arterial line, pulse oximetry, ventilator management and monitoring; intermittent blood gas analysis.  Care plan discussed with ICU care team.  patient remain critical; required more than usual post op care; I have spent 50 minutes providing non routine post op care, revaluated multiple times during the day .       Care Plan discussed with the ICU care team. Patient remained critical, at risk for life threatening decompensation.     By signing my name below, I, Erin Palafox, attest that this documentation has been prepared under the direction and in the presence of Yusef Millan MD.  Electronically signed: Brian Hunt, 12-15-22 @ 20:27    I, Monty Holbrook, personally performed the services described in this documentation. all medical record entries made by the scribe were at my direction and in my presence. I have reviewed the chart and agree that the record reflects my personal performance and is accurate and complete  Electronically signed: Yusef Millan MD.

## 2022-12-15 NOTE — PROGRESS NOTE ADULT - SUBJECTIVE AND OBJECTIVE BOX
[facility] DIVISION OF KIDNEY DISEASES AND HYPERTENSION   FOLLOW UP NOTE    --------------------------------------------------------------------------------  HPI:  61 y/o male with PMH of CABG, DM, HTN, HLD presenting as transfer from [place] for c/f STEMI. Course c/b gallstone pancreatitis leading to ARDS and shock & cardiac arrest now on [facility] ECMO. Had significant troponin elevation and his LVEF down to 8%. Pt was deemed to be too unstable to have an angiogram and potential PCI due to his co-morbidities & a new subdural bleed. Pt being seen for ERIC. SCr on arrival was 2.15; trended down to hector 1.6 on 11/25 and eventually increased to 3.95 11/28. Pt received IV diuretics as per CTU.    Patient seen & examined today. Pt is intubated, unable to obtain ROS. Pt initiated on CRRT on 12/5/22 to optimize volume status and electrolytes. Pt tolerating CRRT overnight with target net negative fluid balance. Pt underwent decannulation of ECMO on 12/8/22. Pt was restarted overnight on 12/9/22 for volume overload. Pt with clotting of CRRT circuit on 12/14 and was initiated on heparin with CRRT circuit.  Pt tolerating CRRT with no clotting of filter overnight.     PAST HISTORY  --------------------------------------------------------------------------------  No significant changes to PMH, PSH, FHx, SHx, unless otherwise noted    ALLERGIES & MEDICATIONS  --------------------------------------------------------------------------------  Allergies    No Known Allergies    Intolerances    Standing Inpatient Medications  albuterol/ipratropium for Nebulization 3 milliLiter(s) Nebulizer every 6 hours  aMIOdarone    Tablet   Oral   aMIOdarone    Tablet 200 milliGRAM(s) Oral daily  atorvastatin 80 milliGRAM(s) Oral at bedtime  chlorhexidine 0.12% Liquid 15 milliLiter(s) Oral Mucosa every 12 hours  chlorhexidine 2% Cloths 1 Application(s) Topical <User Schedule>  CRRT Treatment    <Continuous>  dexMEDEtomidine Infusion 0.3 MICROgram(s)/kG/Hr IV Continuous <Continuous>  fluconAZOLE IVPB      fluconAZOLE IVPB 200 milliGRAM(s) IV Intermittent every 24 hours  heparin  Infusion 800 Unit(s)/Hr IV Continuous <Continuous>  heparin  Infusion Syringe 300 Unit(s)/Hr CRRT <Continuous>  insulin lispro (ADMELOG) corrective regimen sliding scale   SubCutaneous every 4 hours  meropenem  IVPB 1000 milliGRAM(s) IV Intermittent every 12 hours  metoclopramide Injectable 5 milliGRAM(s) IV Push every 8 hours  milrinone Infusion 0.1 MICROgram(s)/kG/Min IV Continuous <Continuous>  multivitamin/minerals/iron Oral Solution (CENTRUM) 15 milliLiter(s) Oral daily  pantoprazole  Injectable 40 milliGRAM(s) IV Push daily  Phoxillum Filtration BK 4 / 2.5 5000 milliLiter(s) CRRT <Continuous>  Phoxillum Filtration BK 4 / 2.5 5000 milliLiter(s) CRRT <Continuous>  polyethylene glycol 3350 17 Gram(s) Oral two times a day  PrismaSOL Filtration BGK 4 / 2.5 5000 milliLiter(s) CRRT <Continuous>  senna 2 Tablet(s) Oral at bedtime  sodium chloride 0.9%. 1000 milliLiter(s) IV Continuous <Continuous>  thiamine 100 milliGRAM(s) Enteral Tube daily  vancomycin  IVPB 1000 milliGRAM(s) IV Intermittent every 24 hours  vasopressin Infusion 0.02 Unit(s)/Min IV Continuous <Continuous>    PRN Inpatient Medications  acetaminophen    Suspension .. 650 milliGRAM(s) Enteral Tube every 6 hours PRN  lidocaine   4% Patch 1 Patch Transdermal daily PRN      REVIEW OF SYSTEMS  --------------------------------------------------------------------------------  Unable to obtain    VITALS/PHYSICAL EXAM  --------------------------------------------------------------------------------  T(C): 37.1 (12-15-22 @ 08:00), Max: 38 (12-14-22 @ 20:00)  HR: 100 (12-15-22 @ 08:49) (96 - 118)  BP: --  RR: 25 (12-15-22 @ 08:00) (12 - 35)  SpO2: 100% (12-15-22 @ 08:49) (94% - 100%)  Wt(kg): --    12-14-22 @ 07:01  -  12-15-22 @ 07:00  --------------------------------------------------------  IN: 2756 mL / OUT: 3790 mL / NET: -1034 mL    12-15-22 @ 07:01  -  12-15-22 @ 09:06  --------------------------------------------------------  IN: 94 mL / OUT: 340 mL / NET: -246 mL      Physical Exam:  	 Gen: ill appearing male intubated   	HEENT: Anicteric  	Pulm: on CMV via ETT+  	CV: S1S2+  	Abd: Soft, +BS       	Ext: LE edema B/L++  	Neuro: awake and nods spontaneously  	Skin: Warm and dry              Dialysis acces: right non tunneled HD catheter    LABS/STUDIES  --------------------------------------------------------------------------------              8.8    15.87 >-----------<  107      [12-15-22 @ 00:45]              28.7     137  |  102  |  23  ----------------------------<  174      [12-15-22 @ 00:46]  4.3   |  23  |  1.63        Ca     7.6     [12-15-22 @ 00:46]      Mg     2.6     [12-15-22 @ 00:46]      Phos  4.0     [12-15-22 @ 00:46]    Creatinine Trend:  SCr 1.63 [12-15 @ 00:46]  SCr 1.43 [12-14 @ 00:34]  SCr 1.38 [12-13 @ 01:08]  SCr 1.55 [12-12 @ 00:41]  SCr 1.84 [12-11 @ 00:26]    Urinalysis - [12-10-22 @ 20:01]      Color Brown / Appearance Slightly Turbid / SG 1.020 / pH 5.0      Gluc 100 mg/dL / Ketone Trace  / Bili Moderate / Urobili Negative       Blood Large / Protein 300 mg/dL / Leuk Est Large / Nitrite Positive      RBC >50 / WBC >50 / Hyaline  / Gran 10 / Sq Epi  / Non Sq Epi 3 / Bacteria Moderate   [facility] DIVISION OF KIDNEY DISEASES AND HYPERTENSION   FOLLOW UP NOTE    --------------------------------------------------------------------------------  HPI:  61 y/o male with PMH of CABG, DM, HTN, HLD presenting as transfer from [place] for c/f STEMI. Course c/b gallstone pancreatitis leading to ARDS and shock & cardiac arrest now on [facility] ECMO. Had significant troponin elevation and his LVEF down to 8%. Pt was deemed to be too unstable to have an angiogram and potential PCI due to his co-morbidities & a new subdural bleed. Pt being seen for ERIC. SCr on arrival was 2.15; trended down to hector 1.6 on 11/25 and eventually increased to 3.95 11/28. Pt received IV diuretics as per CTU.    Patient seen & examined today. Pt is intubated, unable to obtain ROS. Pt initiated on CRRT on 12/5/22 to optimize volume status and electrolytes. Pt tolerating CRRT overnight with target net negative fluid balance. Pt underwent decannulation of ECMO on 12/8/22. Pt was restarted overnight on 12/9/22 for volume overload. Pt with clotting of CRRT circuit on 12/14 and was initiated on heparin with CRRT circuit.  Pt tolerating CRRT with no clotting of filter overnight.     PAST HISTORY  --------------------------------------------------------------------------------  No significant changes to PMH, PSH, FHx, SHx, unless otherwise noted    ALLERGIES & MEDICATIONS  --------------------------------------------------------------------------------  Allergies    No Known Allergies    Intolerances    Standing Inpatient Medications  albuterol/ipratropium for Nebulization 3 milliLiter(s) Nebulizer every 6 hours  aMIOdarone    Tablet   Oral   aMIOdarone    Tablet 200 milliGRAM(s) Oral daily  atorvastatin 80 milliGRAM(s) Oral at bedtime  chlorhexidine 0.12% Liquid 15 milliLiter(s) Oral Mucosa every 12 hours  chlorhexidine 2% Cloths 1 Application(s) Topical <User Schedule>  CRRT Treatment    <Continuous>  dexMEDEtomidine Infusion 0.3 MICROgram(s)/kG/Hr IV Continuous <Continuous>  fluconAZOLE IVPB      fluconAZOLE IVPB 200 milliGRAM(s) IV Intermittent every 24 hours  heparin  Infusion 800 Unit(s)/Hr IV Continuous <Continuous>  heparin  Infusion Syringe 300 Unit(s)/Hr CRRT <Continuous>  insulin lispro (ADMELOG) corrective regimen sliding scale   SubCutaneous every 4 hours  meropenem  IVPB 1000 milliGRAM(s) IV Intermittent every 12 hours  metoclopramide Injectable 5 milliGRAM(s) IV Push every 8 hours  milrinone Infusion 0.1 MICROgram(s)/kG/Min IV Continuous <Continuous>  multivitamin/minerals/iron Oral Solution (CENTRUM) 15 milliLiter(s) Oral daily  pantoprazole  Injectable 40 milliGRAM(s) IV Push daily  Phoxillum Filtration BK 4 / 2.5 5000 milliLiter(s) CRRT <Continuous>  Phoxillum Filtration BK 4 / 2.5 5000 milliLiter(s) CRRT <Continuous>  polyethylene glycol 3350 17 Gram(s) Oral two times a day  PrismaSOL Filtration BGK 4 / 2.5 5000 milliLiter(s) CRRT <Continuous>  senna 2 Tablet(s) Oral at bedtime  sodium chloride 0.9%. 1000 milliLiter(s) IV Continuous <Continuous>  thiamine 100 milliGRAM(s) Enteral Tube daily  vancomycin  IVPB 1000 milliGRAM(s) IV Intermittent every 24 hours  vasopressin Infusion 0.02 Unit(s)/Min IV Continuous <Continuous>    PRN Inpatient Medications  acetaminophen    Suspension .. 650 milliGRAM(s) Enteral Tube every 6 hours PRN  lidocaine   4% Patch 1 Patch Transdermal daily PRN      REVIEW OF SYSTEMS  --------------------------------------------------------------------------------  Unable to obtain    VITALS/PHYSICAL EXAM  --------------------------------------------------------------------------------  T(C): 37.1 (12-15-22 @ 08:00), Max: 38 (12-14-22 @ 20:00)  HR: 100 (12-15-22 @ 08:49) (96 - 118)  BP: --  RR: 25 (12-15-22 @ 08:00) (12 - 35)  SpO2: 100% (12-15-22 @ 08:49) (94% - 100%)  Wt(kg): --    12-14-22 @ 07:01  -  12-15-22 @ 07:00  --------------------------------------------------------  IN: 2756 mL / OUT: 3790 mL / NET: -1034 mL    12-15-22 @ 07:01  -  12-15-22 @ 09:06  --------------------------------------------------------  IN: 94 mL / OUT: 340 mL / NET: -246 mL      Physical Exam:  	 Gen: ill appearing male intubated   	HEENT: Anicteric  	Pulm: on CMV via ETT+  	CV: S1S2+  	Abd: Soft, +BS       	Ext: LE edema B/L++  	Neuro: awake and nods spontaneously  	Skin: Warm and dry              Dialysis acces: right non tunneled HD catheter    LABS/STUDIES  --------------------------------------------------------------------------------              8.8    15.87 >-----------<  107      [12-15-22 @ 00:45]              28.7     137  |  102  |  23  ----------------------------<  174      [12-15-22 @ 00:46]  4.3   |  23  |  1.63        Ca     7.6     [12-15-22 @ 00:46]      Mg     2.6     [12-15-22 @ 00:46]      Phos  4.0     [12-15-22 @ 00:46]    Creatinine Trend:  SCr 1.63 [12-15 @ 00:46]  SCr 1.43 [12-14 @ 00:34]  SCr 1.38 [12-13 @ 01:08]  SCr 1.55 [12-12 @ 00:41]  SCr 1.84 [12-11 @ 00:26]    Urinalysis - [12-10-22 @ 20:01]      Color Brown / Appearance Slightly Turbid / SG 1.020 / pH 5.0      Gluc 100 mg/dL / Ketone Trace  / Bili Moderate / Urobili Negative       Blood Large / Protein 300 mg/dL / Leuk Est Large / Nitrite Positive      RBC >50 / WBC >50 / Hyaline  / Gran 10 / Sq Epi  / Non Sq Epi 3 / Bacteria Moderate   [facility] DIVISION OF KIDNEY DISEASES AND HYPERTENSION   FOLLOW UP NOTE    --------------------------------------------------------------------------------  HPI:  63 y/o male with PMH of CABG, DM, HTN, HLD presenting as transfer from [place] for c/f STEMI. Course c/b gallstone pancreatitis leading to ARDS and shock & cardiac arrest now on [facility] ECMO. Had significant troponin elevation and his LVEF down to 8%. Pt was deemed to be too unstable to have an angiogram and potential PCI due to his co-morbidities & a new subdural bleed. Pt being seen for ERIC. SCr on arrival was 2.15; trended down to hector 1.6 on 11/25 and eventually increased to 3.95 11/28. Pt received IV diuretics as per CTU.    Patient seen & examined today. Pt is intubated, unable to obtain ROS. Pt initiated on CRRT on 12/5/22 to optimize volume status and electrolytes. Pt tolerating CRRT overnight with target net negative fluid balance. Pt underwent decannulation of ECMO on 12/8/22. Pt was restarted overnight on 12/9/22 for volume overload. Pt with clotting of CRRT circuit on 12/14 and was initiated on heparin with CRRT circuit.  Pt tolerating CRRT with no clotting of filter overnight.     PAST HISTORY  --------------------------------------------------------------------------------  No significant changes to PMH, PSH, FHx, SHx, unless otherwise noted    ALLERGIES & MEDICATIONS  --------------------------------------------------------------------------------  Allergies    No Known Allergies    Intolerances    Standing Inpatient Medications  albuterol/ipratropium for Nebulization 3 milliLiter(s) Nebulizer every 6 hours  aMIOdarone    Tablet   Oral   aMIOdarone    Tablet 200 milliGRAM(s) Oral daily  atorvastatin 80 milliGRAM(s) Oral at bedtime  chlorhexidine 0.12% Liquid 15 milliLiter(s) Oral Mucosa every 12 hours  chlorhexidine 2% Cloths 1 Application(s) Topical <User Schedule>  CRRT Treatment    <Continuous>  dexMEDEtomidine Infusion 0.3 MICROgram(s)/kG/Hr IV Continuous <Continuous>  fluconAZOLE IVPB      fluconAZOLE IVPB 200 milliGRAM(s) IV Intermittent every 24 hours  heparin  Infusion 800 Unit(s)/Hr IV Continuous <Continuous>  heparin  Infusion Syringe 300 Unit(s)/Hr CRRT <Continuous>  insulin lispro (ADMELOG) corrective regimen sliding scale   SubCutaneous every 4 hours  meropenem  IVPB 1000 milliGRAM(s) IV Intermittent every 12 hours  metoclopramide Injectable 5 milliGRAM(s) IV Push every 8 hours  milrinone Infusion 0.1 MICROgram(s)/kG/Min IV Continuous <Continuous>  multivitamin/minerals/iron Oral Solution (CENTRUM) 15 milliLiter(s) Oral daily  pantoprazole  Injectable 40 milliGRAM(s) IV Push daily  Phoxillum Filtration BK 4 / 2.5 5000 milliLiter(s) CRRT <Continuous>  Phoxillum Filtration BK 4 / 2.5 5000 milliLiter(s) CRRT <Continuous>  polyethylene glycol 3350 17 Gram(s) Oral two times a day  PrismaSOL Filtration BGK 4 / 2.5 5000 milliLiter(s) CRRT <Continuous>  senna 2 Tablet(s) Oral at bedtime  sodium chloride 0.9%. 1000 milliLiter(s) IV Continuous <Continuous>  thiamine 100 milliGRAM(s) Enteral Tube daily  vancomycin  IVPB 1000 milliGRAM(s) IV Intermittent every 24 hours  vasopressin Infusion 0.02 Unit(s)/Min IV Continuous <Continuous>    PRN Inpatient Medications  acetaminophen    Suspension .. 650 milliGRAM(s) Enteral Tube every 6 hours PRN  lidocaine   4% Patch 1 Patch Transdermal daily PRN      REVIEW OF SYSTEMS  --------------------------------------------------------------------------------  Unable to obtain    VITALS/PHYSICAL EXAM  --------------------------------------------------------------------------------  T(C): 37.1 (12-15-22 @ 08:00), Max: 38 (12-14-22 @ 20:00)  HR: 100 (12-15-22 @ 08:49) (96 - 118)  BP: --  RR: 25 (12-15-22 @ 08:00) (12 - 35)  SpO2: 100% (12-15-22 @ 08:49) (94% - 100%)  Wt(kg): --    12-14-22 @ 07:01  -  12-15-22 @ 07:00  --------------------------------------------------------  IN: 2756 mL / OUT: 3790 mL / NET: -1034 mL    12-15-22 @ 07:01  -  12-15-22 @ 09:06  --------------------------------------------------------  IN: 94 mL / OUT: 340 mL / NET: -246 mL      Physical Exam:  	 Gen: ill appearing male intubated   	HEENT: Anicteric  	Pulm: on CMV via ETT+  	CV: S1S2+  	Abd: Soft, +BS       	Ext: LE edema B/L++  	Neuro: awake and nods spontaneously  	Skin: Warm and dry              Dialysis acces: right non tunneled HD catheter    LABS/STUDIES  --------------------------------------------------------------------------------              8.8    15.87 >-----------<  107      [12-15-22 @ 00:45]              28.7     137  |  102  |  23  ----------------------------<  174      [12-15-22 @ 00:46]  4.3   |  23  |  1.63        Ca     7.6     [12-15-22 @ 00:46]      Mg     2.6     [12-15-22 @ 00:46]      Phos  4.0     [12-15-22 @ 00:46]    Creatinine Trend:  SCr 1.63 [12-15 @ 00:46]  SCr 1.43 [12-14 @ 00:34]  SCr 1.38 [12-13 @ 01:08]  SCr 1.55 [12-12 @ 00:41]  SCr 1.84 [12-11 @ 00:26]    Urinalysis - [12-10-22 @ 20:01]      Color Brown / Appearance Slightly Turbid / SG 1.020 / pH 5.0      Gluc 100 mg/dL / Ketone Trace  / Bili Moderate / Urobili Negative       Blood Large / Protein 300 mg/dL / Leuk Est Large / Nitrite Positive      RBC >50 / WBC >50 / Hyaline  / Gran 10 / Sq Epi  / Non Sq Epi 3 / Bacteria Moderate

## 2022-12-15 NOTE — CONSULT NOTE ADULT - ASSESSMENT
Mr Marley is a 63 y/o male with PMH of CABG, DM, HTN, HLD admitted with acute cholecystitis and pancreatitis placed on [facility] ECMO for cardiogenic shock with subsequent decannulation on 12/8, now intubated with IABP in place and evaluated for mitral MONROE to treat his severe MR.    Mr Marley is a 61 y/o male with PMH of CABG, DM, HTN, HLD admitted with acute cholecystitis and pancreatitis placed on VA ECMO for cardiogenic shock with subsequent decannulation on 12/8, now intubated with IABP in place and evaluated for mitral MONROE to treat his severe MR.

## 2022-12-15 NOTE — CONSULT NOTE ADULT - SUBJECTIVE AND OBJECTIVE BOX
Structural Heart Team    HPI:  Patient is a 61 y/o male with PMH of CABG, DM, HTN, HLD presenting as transfer from [place] for c/f STEMI. PTA arrival received 325 aspirin, 600 plavix, and no heparin drip started. Around 1:30 am patient had multiple episodes of non-bloody diarrhea and emesis with associated abdominal pain and chest pain, unable to localize, non-radiating, with associated SOB and no associated palpitations or diaphoresis. No recent fevers/chills, cough, rashes, or changes in urination. Does report mild diffuse back pain; started 5 days ago in setting on injury while walking and lifting objects. Denies focal weakness, numbness/tingling, or LOC due does report mild dizziness.  s/p VA ECMO for cardiogenic shock with subsequent decannulation on 12/8, now intubated with IABP in place and evaluated for mitral MONROE to treat his severe MR.        Allergies    No Known Allergies    Intolerances      PAST MEDICAL & SURGICAL HISTORY:    MEDICATIONS  (STANDING):  albuterol/ipratropium for Nebulization 3 milliLiter(s) Nebulizer every 6 hours  aMIOdarone    Tablet   Oral   aMIOdarone    Tablet 200 milliGRAM(s) Oral daily  atorvastatin 80 milliGRAM(s) Oral at bedtime  chlorhexidine 0.12% Liquid 15 milliLiter(s) Oral Mucosa every 12 hours  chlorhexidine 2% Cloths 1 Application(s) Topical <User Schedule>  CRRT Treatment    <Continuous>  dexMEDEtomidine Infusion 0.3 MICROgram(s)/kG/Hr (6.97 mL/Hr) IV Continuous <Continuous>  fluconAZOLE IVPB      fluconAZOLE IVPB 200 milliGRAM(s) IV Intermittent every 24 hours  heparin  Infusion 800 Unit(s)/Hr (9 mL/Hr) IV Continuous <Continuous>  heparin  Infusion Syringe 300 Unit(s)/Hr (0.6 mL/Hr) CRRT <Continuous>  insulin lispro (ADMELOG) corrective regimen sliding scale   SubCutaneous every 4 hours  meropenem  IVPB 1000 milliGRAM(s) IV Intermittent every 12 hours  metoclopramide Injectable 5 milliGRAM(s) IV Push every 8 hours  milrinone Infusion 0.1 MICROgram(s)/kG/Min (2.79 mL/Hr) IV Continuous <Continuous>  multivitamin/minerals/iron Oral Solution (CENTRUM) 15 milliLiter(s) Oral daily  pantoprazole  Injectable 40 milliGRAM(s) IV Push daily  Phoxillum Filtration BK 4 / 2.5 5000 milliLiter(s) (2400 mL/Hr) CRRT <Continuous>  Phoxillum Filtration BK 4 / 2.5 5000 milliLiter(s) (800 mL/Hr) CRRT <Continuous>  polyethylene glycol 3350 17 Gram(s) Oral two times a day  PrismaSOL Filtration BGK 4 / 2.5 5000 milliLiter(s) (200 mL/Hr) CRRT <Continuous>  senna 2 Tablet(s) Oral at bedtime  sodium chloride 0.9%. 1000 milliLiter(s) (5 mL/Hr) IV Continuous <Continuous>  thiamine 100 milliGRAM(s) Enteral Tube daily  vancomycin  IVPB 500 milliGRAM(s) IV Intermittent every 24 hours  vasopressin Infusion 0.02 Unit(s)/Min (3 mL/Hr) IV Continuous <Continuous>      REVIEW OF SYSTEMS:    CONSTITUTIONAL: No weakness, fevers or chills  EYES/ENT: No visual changes;  No vertigo or throat pain   NECK: No pain or stiffness  RESPIRATORY: No cough, wheezing, hemoptysis; No shortness of breath  CARDIOVASCULAR: No chest pain or palpitations  GASTROINTESTINAL: No abdominal or epigastric pain. No nausea, vomiting, or hematemesis; No diarrhea or constipation. No melena or hematochezia.  GENITOURINARY: No dysuria, frequency or hematuria  NEUROLOGICAL: No numbness or weakness  SKIN: No itching, rashes      Vital Signs Last 24 Hrs  T(C): 36.7 (15 Dec 2022 12:00), Max: 38 (14 Dec 2022 20:00)  T(F): 98.1 (15 Dec 2022 12:00), Max: 100.4 (14 Dec 2022 20:00)  HR: 106 (15 Dec 2022 15:00) (96 - 118)  BP: --  BP(mean): --  RR: 28 (15 Dec 2022 15:00) (12 - 35)  SpO2: 100% (15 Dec 2022 15:00) (94% - 100%)    Parameters below as of 15 Dec 2022 12:00  Patient On (Oxygen Delivery Method): ventilator    O2 Concentration (%): 40                          8.8    15.87 )-----------( 107      ( 15 Dec 2022 00:45 )             28.7   12-15    137  |  102  |  23  ----------------------------<  174<H>  4.3   |  23  |  1.63<H>    Ca    7.6<L>      15 Dec 2022 00:46  Phos  4.0     12-15  Mg     2.6     12-15    TPro  6.4  /  Alb  2.9<L>  /  TBili  1.0  /  DBili  x   /  AST  36  /  ALT  16  /  AlkPhos  116  12-15    PT/INR - ( 15 Dec 2022 00:45 )   PT: 12.7 sec;   INR: 1.10 ratio         PTT - ( 15 Dec 2022 11:54 )  PTT:50.6 sec    I&O's Summary    14 Dec 2022 07:01  -  15 Dec 2022 07:00  --------------------------------------------------------  IN: 2756 mL / OUT: 3790 mL / NET: -1034 mL    15 Dec 2022 07:01  -  15 Dec 2022 15:03  --------------------------------------------------------  IN: 817 mL / OUT: 2914 mL / NET: -2097 mL          Physical Exam  General: Intubated, multiple lines gtt  Neurology: Following commands  Respiratory: rhonchi bilaterally   Cardiac: S1S2, no murmurs  Abdominal: Softly distended,+BS   Extremities: 1+ pedal edema, good distal pulses b/l        LHC:   Patent LIMA to the left anterior descending artery  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 PTA arrival received 325 aspirin, 600 plavix, and no heparin drip started. Around 1:30 am patient had multiple episodes of non-bloody diarrhea and emesis with associated abdominal pain and chest pain, unable to localize, non-radiating, with associated SOB and no associated palpitations or diaphoresis. No recent fevers/chills, cough, rashes, or changes in urination. Does report mild diffuse back pain; started 5 days ago in setting on injury while walking and lifting objects. Denies focal weakness, numbness/tingling, or LOC due does report mild dizziness.  s/p VA ECMO for cardiogenic shock with subsequent decannulation on 12/8, now intubated with IABP in place and evaluated for mitral MONROE to treat his severe MR.        Allergies    No Known Allergies    Intolerances      PAST MEDICAL & SURGICAL HISTORY:    MEDICATIONS  (STANDING):  albuterol/ipratropium for Nebulization 3 milliLiter(s) Nebulizer every 6 hours  aMIOdarone    Tablet   Oral   aMIOdarone    Tablet 200 milliGRAM(s) Oral daily  atorvastatin 80 milliGRAM(s) Oral at bedtime  chlorhexidine 0.12% Liquid 15 milliLiter(s) Oral Mucosa every 12 hours  chlorhexidine 2% Cloths 1 Application(s) Topical <User Schedule>  CRRT Treatment    <Continuous>  dexMEDEtomidine Infusion 0.3 MICROgram(s)/kG/Hr (6.97 mL/Hr) IV Continuous <Continuous>  fluconAZOLE IVPB      fluconAZOLE IVPB 200 milliGRAM(s) IV Intermittent every 24 hours  heparin  Infusion 800 Unit(s)/Hr (9 mL/Hr) IV Continuous <Continuous>  heparin  Infusion Syringe 300 Unit(s)/Hr (0.6 mL/Hr) CRRT <Continuous>  insulin lispro (ADMELOG) corrective regimen sliding scale   SubCutaneous every 4 hours  meropenem  IVPB 1000 milliGRAM(s) IV Intermittent every 12 hours  metoclopramide Injectable 5 milliGRAM(s) IV Push every 8 hours  milrinone Infusion 0.1 MICROgram(s)/kG/Min (2.79 mL/Hr) IV Continuous <Continuous>  multivitamin/minerals/iron Oral Solution (CENTRUM) 15 milliLiter(s) Oral daily  pantoprazole  Injectable 40 milliGRAM(s) IV Push daily  Phoxillum Filtration BK 4 / 2.5 5000 milliLiter(s) (2400 mL/Hr) CRRT <Continuous>  Phoxillum Filtration BK 4 / 2.5 5000 milliLiter(s) (800 mL/Hr) CRRT <Continuous>  polyethylene glycol 3350 17 Gram(s) Oral two times a day  PrismaSOL Filtration BGK 4 / 2.5 5000 milliLiter(s) (200 mL/Hr) CRRT <Continuous>  senna 2 Tablet(s) Oral at bedtime  sodium chloride 0.9%. 1000 milliLiter(s) (5 mL/Hr) IV Continuous <Continuous>  thiamine 100 milliGRAM(s) Enteral Tube daily  vancomycin  IVPB 500 milliGRAM(s) IV Intermittent every 24 hours  vasopressin Infusion 0.02 Unit(s)/Min (3 mL/Hr) IV Continuous <Continuous>      REVIEW OF SYSTEMS:    CONSTITUTIONAL: No weakness, fevers or chills  EYES/ENT: No visual changes;  No vertigo or throat pain   NECK: No pain or stiffness  RESPIRATORY: No cough, wheezing, hemoptysis; No shortness of breath  CARDIOVASCULAR: No chest pain or palpitations  GASTROINTESTINAL: No abdominal or epigastric pain. No nausea, vomiting, or hematemesis; No diarrhea or constipation. No melena or hematochezia.  GENITOURINARY: No dysuria, frequency or hematuria  NEUROLOGICAL: No numbness or weakness  SKIN: No itching, rashes      Vital Signs Last 24 Hrs  T(C): 36.7 (15 Dec 2022 12:00), Max: 38 (14 Dec 2022 20:00)  T(F): 98.1 (15 Dec 2022 12:00), Max: 100.4 (14 Dec 2022 20:00)  HR: 106 (15 Dec 2022 15:00) (96 - 118)  BP: --  BP(mean): --  RR: 28 (15 Dec 2022 15:00) (12 - 35)  SpO2: 100% (15 Dec 2022 15:00) (94% - 100%)    Parameters below as of 15 Dec 2022 12:00  Patient On (Oxygen Delivery Method): ventilator    O2 Concentration (%): 40                          8.8    15.87 )-----------( 107      ( 15 Dec 2022 00:45 )             28.7   12-15    137  |  102  |  23  ----------------------------<  174<H>  4.3   |  23  |  1.63<H>    Ca    7.6<L>      15 Dec 2022 00:46  Phos  4.0     12-15  Mg     2.6     12-15    TPro  6.4  /  Alb  2.9<L>  /  TBili  1.0  /  DBili  x   /  AST  36  /  ALT  16  /  AlkPhos  116  12-15    PT/INR - ( 15 Dec 2022 00:45 )   PT: 12.7 sec;   INR: 1.10 ratio         PTT - ( 15 Dec 2022 11:54 )  PTT:50.6 sec    I&O's Summary    14 Dec 2022 07:01  -  15 Dec 2022 07:00  --------------------------------------------------------  IN: 2756 mL / OUT: 3790 mL / NET: -1034 mL    15 Dec 2022 07:01  -  15 Dec 2022 15:03  --------------------------------------------------------  IN: 817 mL / OUT: 2914 mL / NET: -2097 mL          Physical Exam  General: Intubated, multiple lines gtt  Neurology: Following commands  Respiratory: rhonchi bilaterally   Cardiac: S1S2, no murmurs  Abdominal: Softly distended,+BS   Extremities: 1+ pedal edema, good distal pulses b/l        LHC:   Patent LIMA to the left anterior descending artery  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 PTA arrival received 325 aspirin, 600 plavix, and no heparin drip started. Around 1:30 am patient had multiple episodes of non-bloody diarrhea and emesis with associated abdominal pain and chest pain, unable to localize, non-radiating, with associated SOB and no associated palpitations or diaphoresis. No recent fevers/chills, cough, rashes, or changes in urination. Does report mild diffuse back pain; started 5 days ago in setting on injury while walking and lifting objects. Denies focal weakness, numbness/tingling, or LOC due does report mild dizziness.  s/p VA ECMO for cardiogenic shock with subsequent decannulation on 12/8, now intubated with IABP in place and evaluated for mitral MONROE to treat his severe MR.        Allergies    No Known Allergies    Intolerances      PAST MEDICAL & SURGICAL HISTORY:    MEDICATIONS  (STANDING):  albuterol/ipratropium for Nebulization 3 milliLiter(s) Nebulizer every 6 hours  aMIOdarone    Tablet   Oral   aMIOdarone    Tablet 200 milliGRAM(s) Oral daily  atorvastatin 80 milliGRAM(s) Oral at bedtime  chlorhexidine 0.12% Liquid 15 milliLiter(s) Oral Mucosa every 12 hours  chlorhexidine 2% Cloths 1 Application(s) Topical <User Schedule>  CRRT Treatment    <Continuous>  dexMEDEtomidine Infusion 0.3 MICROgram(s)/kG/Hr (6.97 mL/Hr) IV Continuous <Continuous>  fluconAZOLE IVPB      fluconAZOLE IVPB 200 milliGRAM(s) IV Intermittent every 24 hours  heparin  Infusion 800 Unit(s)/Hr (9 mL/Hr) IV Continuous <Continuous>  heparin  Infusion Syringe 300 Unit(s)/Hr (0.6 mL/Hr) CRRT <Continuous>  insulin lispro (ADMELOG) corrective regimen sliding scale   SubCutaneous every 4 hours  meropenem  IVPB 1000 milliGRAM(s) IV Intermittent every 12 hours  metoclopramide Injectable 5 milliGRAM(s) IV Push every 8 hours  milrinone Infusion 0.1 MICROgram(s)/kG/Min (2.79 mL/Hr) IV Continuous <Continuous>  multivitamin/minerals/iron Oral Solution (CENTRUM) 15 milliLiter(s) Oral daily  pantoprazole  Injectable 40 milliGRAM(s) IV Push daily  Phoxillum Filtration BK 4 / 2.5 5000 milliLiter(s) (2400 mL/Hr) CRRT <Continuous>  Phoxillum Filtration BK 4 / 2.5 5000 milliLiter(s) (800 mL/Hr) CRRT <Continuous>  polyethylene glycol 3350 17 Gram(s) Oral two times a day  PrismaSOL Filtration BGK 4 / 2.5 5000 milliLiter(s) (200 mL/Hr) CRRT <Continuous>  senna 2 Tablet(s) Oral at bedtime  sodium chloride 0.9%. 1000 milliLiter(s) (5 mL/Hr) IV Continuous <Continuous>  thiamine 100 milliGRAM(s) Enteral Tube daily  vancomycin  IVPB 500 milliGRAM(s) IV Intermittent every 24 hours  vasopressin Infusion 0.02 Unit(s)/Min (3 mL/Hr) IV Continuous <Continuous>      REVIEW OF SYSTEMS:    CONSTITUTIONAL: No weakness, fevers or chills  EYES/ENT: No visual changes;  No vertigo or throat pain   NECK: No pain or stiffness  RESPIRATORY: No cough, wheezing, hemoptysis; No shortness of breath  CARDIOVASCULAR: No chest pain or palpitations  GASTROINTESTINAL: No abdominal or epigastric pain. No nausea, vomiting, or hematemesis; No diarrhea or constipation. No melena or hematochezia.  GENITOURINARY: No dysuria, frequency or hematuria  NEUROLOGICAL: No numbness or weakness  SKIN: No itching, rashes      Vital Signs Last 24 Hrs  T(C): 36.7 (15 Dec 2022 12:00), Max: 38 (14 Dec 2022 20:00)  T(F): 98.1 (15 Dec 2022 12:00), Max: 100.4 (14 Dec 2022 20:00)  HR: 106 (15 Dec 2022 15:00) (96 - 118)  BP: --  BP(mean): --  RR: 28 (15 Dec 2022 15:00) (12 - 35)  SpO2: 100% (15 Dec 2022 15:00) (94% - 100%)    Parameters below as of 15 Dec 2022 12:00  Patient On (Oxygen Delivery Method): ventilator    O2 Concentration (%): 40                          8.8    15.87 )-----------( 107      ( 15 Dec 2022 00:45 )             28.7   12-15    137  |  102  |  23  ----------------------------<  174<H>  4.3   |  23  |  1.63<H>    Ca    7.6<L>      15 Dec 2022 00:46  Phos  4.0     12-15  Mg     2.6     12-15    TPro  6.4  /  Alb  2.9<L>  /  TBili  1.0  /  DBili  x   /  AST  36  /  ALT  16  /  AlkPhos  116  12-15    PT/INR - ( 15 Dec 2022 00:45 )   PT: 12.7 sec;   INR: 1.10 ratio         PTT - ( 15 Dec 2022 11:54 )  PTT:50.6 sec    I&O's Summary    14 Dec 2022 07:01  -  15 Dec 2022 07:00  --------------------------------------------------------  IN: 2756 mL / OUT: 3790 mL / NET: -1034 mL    15 Dec 2022 07:01  -  15 Dec 2022 15:03  --------------------------------------------------------  IN: 817 mL / OUT: 2914 mL / NET: -2097 mL          Physical Exam  General: Intubated, multiple lines gtt  Neurology: Following commands  Respiratory: rhonchi bilaterally   Cardiac: S1S2, no murmurs  Abdominal: Softly distended,+BS   Extremities: 1+ pedal edema, good distal pulses b/l        LHC:   Patent LIMA to the left anterior descending artery  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

## 2022-12-15 NOTE — PROGRESS NOTE ADULT - ATTENDING COMMENTS
[facility] ATN  Cardiogenic shock  Continue CRRT  Continue phox 4K  Heparin in system  Dw CTS    Melissa Mercer MD  Off: [phone]  contact me on teams    (After 5 pm or on weekends please page the on-call fellow/attending, can check [email or web address] for schedule. Login is carmen cantor, schedule under [facility] medicine, psych, derm) ER[facility] ATN  Cardiogenic shock  Continue CRRT  Continue phox 4K  Heparin in system  Dw CTS    Melissa Mercer MD  Off: [phone]  contact me on teams    (After 5 pm or on weekends please page the on-call fellow/attending, can check [email or web address] for schedule. Login is carmen cantor, schedule under [facility] medicine, psych, derm) ERIC ATN  Cardiogenic shock  Continue CRRT  Continue phox 4K  Heparin in system  Dw CTS    Melissa Mercer MD  Off: [phone]  contact me on teams    (After 5 pm or on weekends please page the on-call fellow/attending, can check [email or web address] for schedule. Login is carmen cantor, schedule under [facility] medicine, psych, derm)

## 2022-12-15 NOTE — PRE-ANESTHESIA EVALUATION ADULT - NSRADCARDRESULTSFT_GEN_ALL_CORE
< from: TTE with Doppler (w/Cont) (12.10.22 @ 10:05) >    Conclusions:  1. Mitral annular calcification, otherwise normal mitral  valve. Mild-moderate mitral regurgitation.  2. Normal left ventricular internal dimensions and wall  thickness.  3. Severe global left ventricular systolic dysfunction.  Endocardial visualization enhanced with intravenous  injection of Ultrasonic Enhancing Agent (Definity).  No LV  thrombus seen.  4. The right ventricle is not well visualized.    < end of copied text >    < from: Intra-Operative Transesophageal Echo (12.08.22 @ 09:08) >    Post-Bypass Observations:    S/P VA ECMO decannulation.  EF: 35 by modified chamberlain's.   Inferior wall hypokinesis.  Mod-severe mitral  regurgitation, vena contracta: 6 mm.  Moderate tricuspid  regurgitation.  Normal right ventricular function.  All  other findings unchanged.  All findings discussed with surgeon.  ------------------------------------------------------------------------  Conclusions:  1. Normal mitral valve with restricted motion.  Mild mitral  annular calcification. Moderate-severe mitral  regurgitation.  Darion 3b.  Vena contracta: 5mm. No  mitral stenosis.  2. Normal trileaflet aortic valve. No aortic stenosis. No  aortic valve regurgitation seen.  3. Normal aortic root.  No significant atheromatous disease  or dissection seen in visualized portions of aorta.  Intraaortic balloon pump is in good position approximataly  2 cm below left subclavian artery after advancing 1 cm  under GRZEGORZ guidance.  4. Dilated left atrium.  No thrombus visualized in left  atrial appendage.  5. Normal left ventricular internal dimensions and wall  thicknesses.  6. Moderately reduced left ventricular systolic function.  EF approximately 40-45% by visual estimate on ECMO and IABP  support.  Inferior wall hypokinesis. No LVOT obstruction or  XIMENA visualized.  7. Normal right atrium.  8. Normal right ventricular size and function.  9. Normal tricuspid valve. Mild-moderate tricuspid  regurgitation.  10. Normal pulmonic valve.  11. Color Doppler demonstrates no evidence of a patent  foramen ovale.  12. Normal pericardium with no pericardial effusion.  13. Small left pleural effusion.    < end of copied text >

## 2022-12-16 ENCOUNTER — APPOINTMENT (OUTPATIENT)
Dept: CARDIOTHORACIC SURGERY | Facility: HOSPITAL | Age: 62
End: 2022-12-16

## 2022-12-16 ENCOUNTER — TRANSCRIPTION ENCOUNTER (OUTPATIENT)
Age: 62
End: 2022-12-16

## 2022-12-16 LAB
ALBUMIN SERPL ELPH-MCNC: 3.1 G/DL — LOW (ref 3.3–5)
ALBUMIN SERPL ELPH-MCNC: 3.4 G/DL — SIGNIFICANT CHANGE UP (ref 3.3–5)
ALP SERPL-CCNC: 112 U/L — SIGNIFICANT CHANGE UP (ref 40–120)
ALP SERPL-CCNC: 116 U/L — SIGNIFICANT CHANGE UP (ref 40–120)
ALT FLD-CCNC: 17 U/L — SIGNIFICANT CHANGE UP (ref 10–45)
ALT FLD-CCNC: 18 U/L — SIGNIFICANT CHANGE UP (ref 10–45)
ANION GAP SERPL CALC-SCNC: 12 MMOL/L — SIGNIFICANT CHANGE UP (ref 5–17)
ANION GAP SERPL CALC-SCNC: 16 MMOL/L — SIGNIFICANT CHANGE UP (ref 5–17)
APPEARANCE UR: ABNORMAL
APTT BLD: 154.5 SEC — CRITICAL HIGH (ref 27.5–35.5)
APTT BLD: 27.7 SEC — SIGNIFICANT CHANGE UP (ref 27.5–35.5)
APTT BLD: 52.9 SEC — HIGH (ref 27.5–35.5)
APTT BLD: 54.9 SEC — HIGH (ref 27.5–35.5)
AST SERPL-CCNC: 39 U/L — SIGNIFICANT CHANGE UP (ref 10–40)
AST SERPL-CCNC: 44 U/L — HIGH (ref 10–40)
BACTERIA # UR AUTO: ABNORMAL
BASE EXCESS BLDV CALC-SCNC: -7.4 MMOL/L — LOW (ref -2–3)
BILIRUB SERPL-MCNC: 0.9 MG/DL — SIGNIFICANT CHANGE UP (ref 0.2–1.2)
BILIRUB SERPL-MCNC: 1 MG/DL — SIGNIFICANT CHANGE UP (ref 0.2–1.2)
BILIRUB UR-MCNC: NEGATIVE — SIGNIFICANT CHANGE UP
BUN SERPL-MCNC: 19 MG/DL — SIGNIFICANT CHANGE UP (ref 7–23)
BUN SERPL-MCNC: 22 MG/DL — SIGNIFICANT CHANGE UP (ref 7–23)
CALCIUM SERPL-MCNC: 8 MG/DL — LOW (ref 8.4–10.5)
CALCIUM SERPL-MCNC: 8.3 MG/DL — LOW (ref 8.4–10.5)
CHLORIDE SERPL-SCNC: 101 MMOL/L — SIGNIFICANT CHANGE UP (ref 96–108)
CHLORIDE SERPL-SCNC: 103 MMOL/L — SIGNIFICANT CHANGE UP (ref 96–108)
CK MB BLD-MCNC: 5.9 % — HIGH (ref 0–3.5)
CK MB CFR SERPL CALC: 1.6 NG/ML — SIGNIFICANT CHANGE UP (ref 0–6.7)
CK SERPL-CCNC: 27 U/L — LOW (ref 30–200)
CO2 BLDV-SCNC: 22 MMOL/L — SIGNIFICANT CHANGE UP (ref 22–26)
CO2 SERPL-SCNC: 18 MMOL/L — LOW (ref 22–31)
CO2 SERPL-SCNC: 22 MMOL/L — SIGNIFICANT CHANGE UP (ref 22–31)
COLOR SPEC: SIGNIFICANT CHANGE UP
COMMENT - URINE: SIGNIFICANT CHANGE UP
CREAT SERPL-MCNC: 1.41 MG/DL — HIGH (ref 0.5–1.3)
CREAT SERPL-MCNC: 1.88 MG/DL — HIGH (ref 0.5–1.3)
DIFF PNL FLD: ABNORMAL
EGFR: 40 ML/MIN/1.73M2 — LOW
EGFR: 56 ML/MIN/1.73M2 — LOW
EPI CELLS # UR: 8 /HPF — HIGH
FIBRINOGEN PPP-MCNC: 370 MG/DL — SIGNIFICANT CHANGE UP (ref 200–445)
FIBRINOGEN PPP-MCNC: 446 MG/DL — HIGH (ref 200–445)
GAS PNL BLDA: SIGNIFICANT CHANGE UP
GAS PNL BLDV: SIGNIFICANT CHANGE UP
GLUCOSE BLDC GLUCOMTR-MCNC: 107 MG/DL — HIGH (ref 70–99)
GLUCOSE BLDC GLUCOMTR-MCNC: 122 MG/DL — HIGH (ref 70–99)
GLUCOSE BLDC GLUCOMTR-MCNC: 123 MG/DL — HIGH (ref 70–99)
GLUCOSE BLDC GLUCOMTR-MCNC: 131 MG/DL — HIGH (ref 70–99)
GLUCOSE BLDC GLUCOMTR-MCNC: 146 MG/DL — HIGH (ref 70–99)
GLUCOSE BLDC GLUCOMTR-MCNC: 229 MG/DL — HIGH (ref 70–99)
GLUCOSE BLDC GLUCOMTR-MCNC: 259 MG/DL — HIGH (ref 70–99)
GLUCOSE BLDC GLUCOMTR-MCNC: 285 MG/DL — HIGH (ref 70–99)
GLUCOSE BLDC GLUCOMTR-MCNC: 296 MG/DL — HIGH (ref 70–99)
GLUCOSE BLDC GLUCOMTR-MCNC: 423 MG/DL — HIGH (ref 70–99)
GLUCOSE BLDC GLUCOMTR-MCNC: 82 MG/DL — SIGNIFICANT CHANGE UP (ref 70–99)
GLUCOSE BLDC GLUCOMTR-MCNC: 98 MG/DL — SIGNIFICANT CHANGE UP (ref 70–99)
GLUCOSE SERPL-MCNC: 152 MG/DL — HIGH (ref 70–99)
GLUCOSE SERPL-MCNC: 285 MG/DL — HIGH (ref 70–99)
GLUCOSE UR QL: ABNORMAL
GRAM STN FLD: SIGNIFICANT CHANGE UP
HCO3 BLDV-SCNC: 20 MMOL/L — LOW (ref 22–29)
HCT VFR BLD CALC: 28.4 % — LOW (ref 39–50)
HCT VFR BLD CALC: 32.9 % — LOW (ref 39–50)
HGB BLD-MCNC: 10.1 G/DL — LOW (ref 13–17)
HGB BLD-MCNC: 8.8 G/DL — LOW (ref 13–17)
HOROWITZ INDEX BLDV+IHG-RTO: 100 — SIGNIFICANT CHANGE UP
HYALINE CASTS # UR AUTO: 85 /LPF — HIGH (ref 0–2)
INR BLD: 1.07 RATIO — SIGNIFICANT CHANGE UP (ref 0.88–1.16)
INR BLD: 1.1 RATIO — SIGNIFICANT CHANGE UP (ref 0.88–1.16)
KETONES UR-MCNC: NEGATIVE — SIGNIFICANT CHANGE UP
LEUKOCYTE ESTERASE UR-ACNC: ABNORMAL
MAGNESIUM SERPL-MCNC: 2.6 MG/DL — SIGNIFICANT CHANGE UP (ref 1.6–2.6)
MAGNESIUM SERPL-MCNC: 3 MG/DL — HIGH (ref 1.6–2.6)
MCHC RBC-ENTMCNC: 28.8 PG — SIGNIFICANT CHANGE UP (ref 27–34)
MCHC RBC-ENTMCNC: 29.5 PG — SIGNIFICANT CHANGE UP (ref 27–34)
MCHC RBC-ENTMCNC: 30.7 GM/DL — LOW (ref 32–36)
MCHC RBC-ENTMCNC: 31 GM/DL — LOW (ref 32–36)
MCV RBC AUTO: 92.8 FL — SIGNIFICANT CHANGE UP (ref 80–100)
MCV RBC AUTO: 96.2 FL — SIGNIFICANT CHANGE UP (ref 80–100)
NITRITE UR-MCNC: NEGATIVE — SIGNIFICANT CHANGE UP
NRBC # BLD: 0 /100 WBCS — SIGNIFICANT CHANGE UP (ref 0–0)
PCO2 BLDV: 50 MMHG — SIGNIFICANT CHANGE UP (ref 42–55)
PH BLDV: 7.22 — LOW (ref 7.32–7.43)
PH UR: 6 — SIGNIFICANT CHANGE UP (ref 5–8)
PHOSPHATE SERPL-MCNC: 4.3 MG/DL — SIGNIFICANT CHANGE UP (ref 2.5–4.5)
PHOSPHATE SERPL-MCNC: 6.2 MG/DL — HIGH (ref 2.5–4.5)
PLATELET # BLD AUTO: 104 K/UL — LOW (ref 150–400)
PLATELET # BLD AUTO: 114 K/UL — LOW (ref 150–400)
PO2 BLDV: 46 MMHG — HIGH (ref 25–45)
POTASSIUM SERPL-MCNC: 4.5 MMOL/L — SIGNIFICANT CHANGE UP (ref 3.5–5.3)
POTASSIUM SERPL-MCNC: 5.7 MMOL/L — HIGH (ref 3.5–5.3)
POTASSIUM SERPL-SCNC: 4.5 MMOL/L — SIGNIFICANT CHANGE UP (ref 3.5–5.3)
POTASSIUM SERPL-SCNC: 5.7 MMOL/L — HIGH (ref 3.5–5.3)
PREALB SERPL-MCNC: 14 MG/DL — LOW (ref 20–40)
PREALB SERPL-MCNC: 15 MG/DL — LOW (ref 20–40)
PROT SERPL-MCNC: 6.6 G/DL — SIGNIFICANT CHANGE UP (ref 6–8.3)
PROT SERPL-MCNC: 7 G/DL — SIGNIFICANT CHANGE UP (ref 6–8.3)
PROT UR-MCNC: ABNORMAL
PROTHROM AB SERPL-ACNC: 12.4 SEC — SIGNIFICANT CHANGE UP (ref 10.5–13.4)
PROTHROM AB SERPL-ACNC: 12.7 SEC — SIGNIFICANT CHANGE UP (ref 10.5–13.4)
RBC # BLD: 3.06 M/UL — LOW (ref 4.2–5.8)
RBC # BLD: 3.42 M/UL — LOW (ref 4.2–5.8)
RBC # FLD: 18.1 % — HIGH (ref 10.3–14.5)
RBC # FLD: 18.5 % — HIGH (ref 10.3–14.5)
RBC CASTS # UR COMP ASSIST: 369 /HPF — HIGH (ref 0–4)
SAO2 % BLDV: 70.9 % — SIGNIFICANT CHANGE UP (ref 67–88)
SARS-COV-2 RNA SPEC QL NAA+PROBE: SIGNIFICANT CHANGE UP
SODIUM SERPL-SCNC: 135 MMOL/L — SIGNIFICANT CHANGE UP (ref 135–145)
SODIUM SERPL-SCNC: 137 MMOL/L — SIGNIFICANT CHANGE UP (ref 135–145)
SP GR SPEC: 1.03 — HIGH (ref 1.01–1.02)
SPECIMEN SOURCE: SIGNIFICANT CHANGE UP
TROPONIN T, HIGH SENSITIVITY RESULT: 1085 NG/L — HIGH (ref 0–51)
UROBILINOGEN FLD QL: ABNORMAL
WBC # BLD: 12.42 K/UL — HIGH (ref 3.8–10.5)
WBC # BLD: 15.7 K/UL — HIGH (ref 3.8–10.5)
WBC # FLD AUTO: 12.42 K/UL — HIGH (ref 3.8–10.5)
WBC # FLD AUTO: 15.7 K/UL — HIGH (ref 3.8–10.5)
WBC UR QL: 165 /HPF — HIGH (ref 0–5)

## 2022-12-16 PROCEDURE — 33418 REPAIR TCAT MITRAL VALVE: CPT | Mod: 62,Q0

## 2022-12-16 PROCEDURE — 99232 SBSQ HOSP IP/OBS MODERATE 35: CPT

## 2022-12-16 PROCEDURE — 99233 SBSQ HOSP IP/OBS HIGH 50: CPT | Mod: GC

## 2022-12-16 PROCEDURE — 99024 POSTOP FOLLOW-UP VISIT: CPT

## 2022-12-16 PROCEDURE — 71045 X-RAY EXAM CHEST 1 VIEW: CPT | Mod: 26,76

## 2022-12-16 PROCEDURE — 71045 X-RAY EXAM CHEST 1 VIEW: CPT | Mod: 26,77

## 2022-12-16 PROCEDURE — 93355 ECHO TRANSESOPHAGEAL (TEE): CPT

## 2022-12-16 PROCEDURE — 93010 ELECTROCARDIOGRAM REPORT: CPT | Mod: 76

## 2022-12-16 PROCEDURE — 99291 CRITICAL CARE FIRST HOUR: CPT

## 2022-12-16 PROCEDURE — 31600 PLANNED TRACHEOSTOMY: CPT

## 2022-12-16 DEVICE — LIGATING CLIPS WECK HORIZON MEDIUM (BLUE) 24: Type: IMPLANTABLE DEVICE | Status: FUNCTIONAL

## 2022-12-16 DEVICE — GELFOAM 12 X 7MM: Type: IMPLANTABLE DEVICE | Status: FUNCTIONAL

## 2022-12-16 DEVICE — CANNULA VESSEL 3MM BLUNT TIP CLEAR 1-WAY VALVE: Type: IMPLANTABLE DEVICE | Status: FUNCTIONAL

## 2022-12-16 DEVICE — TUBE TRACH 7.0 XLT CUFF PROXIMAL: Type: IMPLANTABLE DEVICE | Status: FUNCTIONAL

## 2022-12-16 DEVICE — LIGATING CLIPS WECK HORIZON LARGE (ORANGE) 6: Type: IMPLANTABLE DEVICE | Status: FUNCTIONAL

## 2022-12-16 DEVICE — LIGATING CLIPS WECK HORIZON SMALL-WIDE (RED) 24: Type: IMPLANTABLE DEVICE | Status: FUNCTIONAL

## 2022-12-16 DEVICE — SURGICEL FIBRILLAR 2 X 4": Type: IMPLANTABLE DEVICE | Status: FUNCTIONAL

## 2022-12-16 DEVICE — CATH THERMODIL PACE 7.5FR: Type: IMPLANTABLE DEVICE | Status: FUNCTIONAL

## 2022-12-16 DEVICE — KIT CVC 2LUM MAC 9FR CHG: Type: IMPLANTABLE DEVICE | Status: FUNCTIONAL

## 2022-12-16 DEVICE — SURGICEL 2 X 14": Type: IMPLANTABLE DEVICE | Status: FUNCTIONAL

## 2022-12-16 DEVICE — KIT A-LINE 1LUM 20G X 12CM SAFE KIT: Type: IMPLANTABLE DEVICE | Status: FUNCTIONAL

## 2022-12-16 DEVICE — INTRODUCER PERCUTANEOUS INSERTION KIT: Type: IMPLANTABLE DEVICE | Status: FUNCTIONAL

## 2022-12-16 RX ORDER — MAGNESIUM SULFATE 500 MG/ML
2 VIAL (ML) INJECTION ONCE
Refills: 0 | Status: COMPLETED | OUTPATIENT
Start: 2022-12-16 | End: 2022-12-16

## 2022-12-16 RX ORDER — PANTOPRAZOLE SODIUM 20 MG/1
40 TABLET, DELAYED RELEASE ORAL DAILY
Refills: 0 | Status: DISCONTINUED | OUTPATIENT
Start: 2022-12-16 | End: 2023-01-14

## 2022-12-16 RX ORDER — NOREPINEPHRINE BITARTRATE/D5W 8 MG/250ML
0.05 PLASTIC BAG, INJECTION (ML) INTRAVENOUS
Qty: 8 | Refills: 0 | Status: DISCONTINUED | OUTPATIENT
Start: 2022-12-16 | End: 2022-12-18

## 2022-12-16 RX ORDER — IPRATROPIUM/ALBUTEROL SULFATE 18-103MCG
3 AEROSOL WITH ADAPTER (GRAM) INHALATION EVERY 6 HOURS
Refills: 0 | Status: DISCONTINUED | OUTPATIENT
Start: 2022-12-16 | End: 2022-12-18

## 2022-12-16 RX ORDER — DEXMEDETOMIDINE HYDROCHLORIDE IN 0.9% SODIUM CHLORIDE 4 UG/ML
0.4 INJECTION INTRAVENOUS
Qty: 200 | Refills: 0 | Status: DISCONTINUED | OUTPATIENT
Start: 2022-12-16 | End: 2022-12-18

## 2022-12-16 RX ORDER — AMIODARONE HYDROCHLORIDE 400 MG/1
200 TABLET ORAL DAILY
Refills: 0 | Status: DISCONTINUED | OUTPATIENT
Start: 2022-12-16 | End: 2023-01-14

## 2022-12-16 RX ORDER — POTASSIUM CHLORIDE 20 MEQ
10 PACKET (EA) ORAL ONCE
Refills: 0 | Status: COMPLETED | OUTPATIENT
Start: 2022-12-16 | End: 2022-12-16

## 2022-12-16 RX ORDER — ATORVASTATIN CALCIUM 80 MG/1
40 TABLET, FILM COATED ORAL AT BEDTIME
Refills: 0 | Status: DISCONTINUED | OUTPATIENT
Start: 2022-12-16 | End: 2022-12-23

## 2022-12-16 RX ORDER — MILRINONE LACTATE 1 MG/ML
0.1 INJECTION, SOLUTION INTRAVENOUS
Qty: 20 | Refills: 0 | Status: DISCONTINUED | OUTPATIENT
Start: 2022-12-16 | End: 2022-12-18

## 2022-12-16 RX ORDER — SODIUM BICARBONATE 1 MEQ/ML
50 SYRINGE (ML) INTRAVENOUS
Refills: 0 | Status: COMPLETED | OUTPATIENT
Start: 2022-12-16 | End: 2022-12-16

## 2022-12-16 RX ORDER — CALCIUM GLUCONATE 100 MG/ML
1 VIAL (ML) INTRAVENOUS ONCE
Refills: 0 | Status: COMPLETED | OUTPATIENT
Start: 2022-12-16 | End: 2022-12-16

## 2022-12-16 RX ORDER — PROPOFOL 10 MG/ML
20 INJECTION, EMULSION INTRAVENOUS
Qty: 1000 | Refills: 0 | Status: DISCONTINUED | OUTPATIENT
Start: 2022-12-16 | End: 2022-12-18

## 2022-12-16 RX ORDER — INSULIN HUMAN 100 [IU]/ML
3 INJECTION, SOLUTION SUBCUTANEOUS
Qty: 100 | Refills: 0 | Status: DISCONTINUED | OUTPATIENT
Start: 2022-12-16 | End: 2022-12-19

## 2022-12-16 RX ORDER — DEXTROSE 50 % IN WATER 50 %
25 SYRINGE (ML) INTRAVENOUS ONCE
Refills: 0 | Status: COMPLETED | OUTPATIENT
Start: 2022-12-16 | End: 2022-12-16

## 2022-12-16 RX ORDER — INSULIN HUMAN 100 [IU]/ML
10 INJECTION, SOLUTION SUBCUTANEOUS ONCE
Refills: 0 | Status: COMPLETED | OUTPATIENT
Start: 2022-12-16 | End: 2022-12-16

## 2022-12-16 RX ORDER — INSULIN LISPRO 100/ML
VIAL (ML) SUBCUTANEOUS EVERY 6 HOURS
Refills: 0 | Status: DISCONTINUED | OUTPATIENT
Start: 2022-12-16 | End: 2022-12-16

## 2022-12-16 RX ORDER — MULTIVIT-MIN/FERROUS GLUCONATE 9 MG/15 ML
15 LIQUID (ML) ORAL DAILY
Refills: 0 | Status: DISCONTINUED | OUTPATIENT
Start: 2022-12-16 | End: 2023-01-20

## 2022-12-16 RX ORDER — PROTAMINE SULFATE 10 MG/ML
25 AMPUL (ML) INTRAVENOUS ONCE
Refills: 0 | Status: COMPLETED | OUTPATIENT
Start: 2022-12-16 | End: 2022-12-16

## 2022-12-16 RX ORDER — VASOPRESSIN 20 [USP'U]/ML
0.02 INJECTION INTRAVENOUS
Qty: 40 | Refills: 0 | Status: DISCONTINUED | OUTPATIENT
Start: 2022-12-16 | End: 2022-12-19

## 2022-12-16 RX ORDER — THIAMINE MONONITRATE (VIT B1) 100 MG
100 TABLET ORAL DAILY
Refills: 0 | Status: DISCONTINUED | OUTPATIENT
Start: 2022-12-16 | End: 2023-01-20

## 2022-12-16 RX ORDER — AMIODARONE HYDROCHLORIDE 400 MG/1
150 TABLET ORAL ONCE
Refills: 0 | Status: COMPLETED | OUTPATIENT
Start: 2022-12-16 | End: 2022-12-16

## 2022-12-16 RX ORDER — CALCIUM GLUCONATE 100 MG/ML
2 VIAL (ML) INTRAVENOUS ONCE
Refills: 0 | Status: COMPLETED | OUTPATIENT
Start: 2022-12-16 | End: 2022-12-16

## 2022-12-16 RX ORDER — CHLORHEXIDINE GLUCONATE 213 G/1000ML
15 SOLUTION TOPICAL EVERY 12 HOURS
Refills: 0 | Status: DISCONTINUED | OUTPATIENT
Start: 2022-12-16 | End: 2022-12-23

## 2022-12-16 RX ORDER — HEPARIN SODIUM 5000 [USP'U]/ML
300 INJECTION INTRAVENOUS; SUBCUTANEOUS
Qty: 10000 | Refills: 0 | Status: DISCONTINUED | OUTPATIENT
Start: 2022-12-16 | End: 2022-12-17

## 2022-12-16 RX ADMIN — MILRINONE LACTATE 2.79 MICROGRAM(S)/KG/MIN: 1 INJECTION, SOLUTION INTRAVENOUS at 11:44

## 2022-12-16 RX ADMIN — MEROPENEM 100 MILLIGRAM(S): 1 INJECTION INTRAVENOUS at 06:24

## 2022-12-16 RX ADMIN — Medication 3 MILLILITER(S): at 11:37

## 2022-12-16 RX ADMIN — PANTOPRAZOLE SODIUM 40 MILLIGRAM(S): 20 TABLET, DELAYED RELEASE ORAL at 13:00

## 2022-12-16 RX ADMIN — VASOPRESSIN 3 UNIT(S)/MIN: 20 INJECTION INTRAVENOUS at 11:43

## 2022-12-16 RX ADMIN — Medication 25 MILLILITER(S): at 11:32

## 2022-12-16 RX ADMIN — CHLORHEXIDINE GLUCONATE 15 MILLILITER(S): 213 SOLUTION TOPICAL at 18:29

## 2022-12-16 RX ADMIN — Medication 200 GRAM(S): at 00:58

## 2022-12-16 RX ADMIN — CHLORHEXIDINE GLUCONATE 15 MILLILITER(S): 213 SOLUTION TOPICAL at 05:11

## 2022-12-16 RX ADMIN — Medication 50 MILLIEQUIVALENT(S): at 11:28

## 2022-12-16 RX ADMIN — CHLORHEXIDINE GLUCONATE 1 APPLICATION(S): 213 SOLUTION TOPICAL at 05:51

## 2022-12-16 RX ADMIN — DEXMEDETOMIDINE HYDROCHLORIDE IN 0.9% SODIUM CHLORIDE 9.29 MICROGRAM(S)/KG/HR: 4 INJECTION INTRAVENOUS at 11:44

## 2022-12-16 RX ADMIN — AMIODARONE HYDROCHLORIDE 200 MILLIGRAM(S): 400 TABLET ORAL at 05:36

## 2022-12-16 RX ADMIN — INSULIN HUMAN 10 UNIT(S): 100 INJECTION, SOLUTION SUBCUTANEOUS at 11:36

## 2022-12-16 RX ADMIN — Medication 3 MILLILITER(S): at 05:29

## 2022-12-16 RX ADMIN — FLUCONAZOLE 100 MILLIGRAM(S): 150 TABLET ORAL at 05:05

## 2022-12-16 RX ADMIN — Medication 100 GRAM(S): at 11:31

## 2022-12-16 RX ADMIN — Medication 315 MILLIGRAM(S): at 12:38

## 2022-12-16 RX ADMIN — PROPOFOL 11.1 MICROGRAM(S)/KG/MIN: 10 INJECTION, EMULSION INTRAVENOUS at 19:29

## 2022-12-16 RX ADMIN — ATORVASTATIN CALCIUM 40 MILLIGRAM(S): 80 TABLET, FILM COATED ORAL at 21:16

## 2022-12-16 RX ADMIN — MILRINONE LACTATE 2.79 MICROGRAM(S)/KG/MIN: 1 INJECTION, SOLUTION INTRAVENOUS at 19:30

## 2022-12-16 RX ADMIN — Medication 25 MILLILITER(S): at 15:15

## 2022-12-16 RX ADMIN — AMIODARONE HYDROCHLORIDE 600 MILLIGRAM(S): 400 TABLET ORAL at 11:27

## 2022-12-16 RX ADMIN — HEPARIN SODIUM 0.6 UNIT(S)/HR: 5000 INJECTION INTRAVENOUS; SUBCUTANEOUS at 19:39

## 2022-12-16 RX ADMIN — Medication 3 MILLILITER(S): at 23:04

## 2022-12-16 RX ADMIN — Medication 50 MILLIEQUIVALENT(S): at 19:02

## 2022-12-16 RX ADMIN — Medication 25 GRAM(S): at 11:28

## 2022-12-16 RX ADMIN — Medication 50 MILLIEQUIVALENT(S): at 11:43

## 2022-12-16 RX ADMIN — AMIODARONE HYDROCHLORIDE 600 MILLIGRAM(S): 400 TABLET ORAL at 11:28

## 2022-12-16 RX ADMIN — Medication 5 MILLIGRAM(S): at 05:10

## 2022-12-16 NOTE — PROGRESS NOTE ADULT - ASSESSMENT
63 YO M with a history of CAD s/p 4v CABG ~2019 in [facility], DM2 (A1c 7.3%), and HTN who initially presented to OSH with abdominal pain and n/v and found to have pancreatitis with lipase > 3000 though also with elevated troponins. CT abdomen revealed acute pancreatitis and his initial LVEF was 40-45%. He developed MSOF with hypoxic respiratory failure requiring intubation and ERIC and went into hypoxic PEA arrest requiring ACLS and due to persistent hypoxia and hypotension on pressors after ROSC was cannulated to VA ECMO (LFV, RFA, no anterograde perfusion catheter) on 11/25. Notably CTH that day revealed small subdural hemorrhage.     His post arrest TTE on 11/26 showed a signficantly worse LVEF of 5-10% with an AV that was only minimally opening. Since then he has had improvement in his LV function (now ~35-40%) and improved pulsatility while tolerating progressive slow ECMO weans. ECMO turndown (note in chart 11/30) was reassuring for ability to decannulate to IABP/inotropes. LHC 12/06 showed closure of his 3 vein grafts with graft to LAD patent though with distal native disease. IABP placed, ECMO successfully decannulated 12/08 (transfused 2u pRBCs during). His ARDS is improving. Currently on CVVH.    Previous cardiac studies:  LHC (12/06/22) - patent LIMA-LAD, RCA , LCx , aortogram w/ closure of 3 vein grafts, LVEDP 22 mm Hg, left-to-left and left-to-right collaterals  TTE (12/03/22) - mod-to-sev segmental LVSD (inferolateral, inferior, inferoseptal hypokinesis)    Hemodynamics:  12/14 IABP 1:1, milrinone 0.1, aDBP 105, aMAP83, lactate 0.8, wBT329.6%, CO 5.9, CI 2.7, SVR 1000,  1.0  12/13 IABP 1:1, milrinone 0.1, aDBP 101, aMAP 85, CVP 5, lactate 1.1, mVO2 71.2%, CO 7.7, CI 3.4,   12/12 IABP 1:1, aDBP 103, aMAP 84, CVP 7, lactate 1.3, mVO2 79.3%, CO 10.5, CI 5.0,   12/09 IABP 1:1, aDBP 126, aMAP 92, CVP 6, lactate 1.2  12/08 IABP 1:1, aDBP 120, aMAP 87, CVP 5  12/07 IABP 1:1 ECMO 3600 RPM 4.0 LPM, aMAP 90, aDBP 123, mVO2 66.8%, CVP 7, CO 6.8, CI 3.3    *** INCOMPLETE NOTE *** INCOMPLETE NOTE *** INCOMPLETE NOTE *** INCOMPLETE NOTE *** INCOMPLETE NOTE *** INCOMPLETE NOTE *** INCOMPLETE NOTE *** INCOMPLETE NOTE *** INCOMPLETE NOTE *** INCOMPLETE NOTE *** INCOMPLETE NOTE *** INCOMPLETE NOTE *** INCOMPLETE NOTE *** INCOMPLETE NOTE *** INCOMPLETE NOTE *** INCOMPLETE NOTE *** INCOMPLETE NOTE *** INCOMPLETE NOTE *** INCOMPLETE NOTE *** INCOMPLETE NOTE ***  RECS BELOW ARE NOT TO BE FOLLOWED UNTIL FINALIZED  amio 200 PO, atorva 80, CRRT, hep gtt, mil 0.1, vaso gtt  dexmed gtt,   flucon, zaina, vanc    Problem/Plan - 1:  ·  Problem: Non-ST elevation MI (NSTEMI).   ·  Plan:   - troponin peaked at > 93795   - [facility] 12/06 with IABP placement revealed that 3 grafts are closed w/ distal native disease from remaining LAD graft  - continue statin  - would start ASA when okay with surgical team.    Problem/Plan - 2:  ·  Problem: Gall stone pancreatitis.   ·  Plan:   - CT abd showing acute pancreatitis  - RUQ showing sludge, no stones  - lipase > 3000 11/24, normalized prior and now fluctuating  - conservative care  - appreciate GI recs, no intervention planned   - surgery following   - appreciate ID recs.    Problem/Plan - 3:  ·  Problem: ERIC (acute kidney injury).   ·  Plan:   - likely arrest associated ATN, hypovolemia   - renal following, c/t CVVH.    Problem/Plan - 4:  ·  Problem: Cardiogenic shock.   ·  Plan:   - ECMO decannulated 12/08 after successful wean  - IABP in place, milrinone 0.1, on 1:1, wean as able, target augmented MAP 70s though will defer afterload agent titration while there is c/f developing sepsis  - cont hep gtt for AC while on IABP.  - repeat limited TTE to assess for degree of MR with and without IABP support today.    Problem/Plan - 5:  ·  Problem: Nontraumatic subdural hematoma.   ·  Plan:   - small 3mm subdural hemorrhage stable on repeat CTs  - okay to continue heparin, appreciate neuro recs  - keep MAP < 75 mmHg and careful monitoring of PTT.    Problem/Plan - 6:  ·  Problem: Acute respiratory failure with hypoxia.   ·  Plan:  - CXR improved with more lung volumes after bronch and increased PEEP  - bronch showed erythematous airways with scant bleeding  - extubate when able  - continue treatment for pancreatitis related ARDS.    Problem/Plan - 7:  ·  Problem: Supraventricular tachycardia.   ·  Plan:   - on review of tele, it appears to be sinus tach  - consider stopping amio.    Problem/Plan - 8:  ·  Problem: Fever.   ·  Plan:   - f/u ID recs, multiple potential sources given recent lines/procedures, as well as possible biliary source  - consider removing non-vital lines  - f/u pan cultures and c/t abx.    Patient remains critically ill. Currently supported with milrinone 0.1 and IABP 1:1 with adequate central sats. Case has been discussed with structural heart team, plan is for tentative mitral clip friday .    63 YO M with a history of CAD s/p 4v CABG ~2019 in [facility], DM2 (A1c 7.3%), and HTN who initially presented to OSH with abdominal pain and n/v and found to have pancreatitis with lipase > 3000 though also with elevated troponins. CT abdomen revealed acute pancreatitis and his initial LVEF was 40-45%. He developed MSOF with hypoxic respiratory failure requiring intubation and ERIC and went into hypoxic PEA arrest requiring ACLS and due to persistent hypoxia and hypotension on pressors after ROSC was cannulated to VA ECMO (LFV, RFA, no anterograde perfusion catheter) on 11/25. Notably CTH that day revealed small subdural hemorrhage.     His post arrest TTE on 11/26 showed a signficantly worse LVEF of 5-10% with an AV that was only minimally opening. Since then he has had improvement in his LV function (now ~35-40%) and improved pulsatility while tolerating progressive slow ECMO weans. ECMO turndown (note in chart 11/30) was reassuring for ability to decannulate to IABP/inotropes. LHC 12/06 showed closure of his 3 vein grafts with graft to LAD patent though with distal native disease. IABP placed, ECMO successfully decannulated 12/08 (transfused 2u pRBCs during). His ARDS is improving. Currently on CVVH.    Previous cardiac studies:  LHC (12/06/22) - patent LIMA-LAD, RCA , LCx , aortogram w/ closure of 3 vein grafts, LVEDP 22 mm Hg, left-to-left and left-to-right collaterals  TTE (12/03/22) - mod-to-sev segmental LVSD (inferolateral, inferior, inferoseptal hypokinesis)    Hemodynamics:  12/14 IABP 1:1, milrinone 0.1, aDBP 105, aMAP83, lactate 0.8, sWV350.6%, CO 5.9, CI 2.7, SVR 1000,  1.0  12/13 IABP 1:1, milrinone 0.1, aDBP 101, aMAP 85, CVP 5, lactate 1.1, mVO2 71.2%, CO 7.7, CI 3.4,   12/12 IABP 1:1, aDBP 103, aMAP 84, CVP 7, lactate 1.3, mVO2 79.3%, CO 10.5, CI 5.0,   12/09 IABP 1:1, aDBP 126, aMAP 92, CVP 6, lactate 1.2  12/08 IABP 1:1, aDBP 120, aMAP 87, CVP 5  12/07 IABP 1:1 ECMO 3600 RPM 4.0 LPM, aMAP 90, aDBP 123, mVO2 66.8%, CVP 7, CO 6.8, CI 3.3    *** INCOMPLETE NOTE *** INCOMPLETE NOTE *** INCOMPLETE NOTE *** INCOMPLETE NOTE *** INCOMPLETE NOTE *** INCOMPLETE NOTE *** INCOMPLETE NOTE *** INCOMPLETE NOTE *** INCOMPLETE NOTE *** INCOMPLETE NOTE *** INCOMPLETE NOTE *** INCOMPLETE NOTE *** INCOMPLETE NOTE *** INCOMPLETE NOTE *** INCOMPLETE NOTE *** INCOMPLETE NOTE *** INCOMPLETE NOTE *** INCOMPLETE NOTE *** INCOMPLETE NOTE *** INCOMPLETE NOTE ***  RECS BELOW ARE NOT TO BE FOLLOWED UNTIL FINALIZED  amio 200 PO, atorva 80, CRRT, hep gtt, mil 0.1, vaso gtt  dexmed gtt,   flucon, zaina, vanc    Problem/Plan - 1:  ·  Problem: Non-ST elevation MI (NSTEMI).   ·  Plan:   - troponin peaked at > 75600   - [place] 12/06 with IABP placement revealed that 3 grafts are closed w/ distal native disease from remaining LAD graft  - continue statin  - would start ASA when okay with surgical team.    Problem/Plan - 2:  ·  Problem: Gall stone pancreatitis.   ·  Plan:   - CT abd showing acute pancreatitis  - RUQ showing sludge, no stones  - lipase > 3000 11/24, normalized prior and now fluctuating  - conservative care  - appreciate GI recs, no intervention planned   - surgery following   - appreciate ID recs.    Problem/Plan - 3:  ·  Problem: ERIC (acute kidney injury).   ·  Plan:   - likely arrest associated ATN, hypovolemia   - renal following, c/t CVVH.    Problem/Plan - 4:  ·  Problem: Cardiogenic shock.   ·  Plan:   - ECMO decannulated 12/08 after successful wean  - IABP in place, milrinone 0.1, on 1:1, wean as able, target augmented MAP 70s though will defer afterload agent titration while there is c/f developing sepsis  - cont hep gtt for AC while on IABP.  - repeat limited TTE to assess for degree of MR with and without IABP support today.    Problem/Plan - 5:  ·  Problem: Nontraumatic subdural hematoma.   ·  Plan:   - small 3mm subdural hemorrhage stable on repeat CTs  - okay to continue heparin, appreciate neuro recs  - keep MAP < 75 mmHg and careful monitoring of PTT.    Problem/Plan - 6:  ·  Problem: Acute respiratory failure with hypoxia.   ·  Plan:  - CXR improved with more lung volumes after bronch and increased PEEP  - bronch showed erythematous airways with scant bleeding  - extubate when able  - continue treatment for pancreatitis related ARDS.    Problem/Plan - 7:  ·  Problem: Supraventricular tachycardia.   ·  Plan:   - on review of tele, it appears to be sinus tach  - consider stopping amio.    Problem/Plan - 8:  ·  Problem: Fever.   ·  Plan:   - f/u ID recs, multiple potential sources given recent lines/procedures, as well as possible biliary source  - consider removing non-vital lines  - f/u pan cultures and c/t abx.    Patient remains critically ill. Currently supported with milrinone 0.1 and IABP 1:1 with adequate central sats. Case has been discussed with structural heart team, plan is for tentative mitral clip friday .    63 YO M with a history of CAD s/p 4v CABG ~2019 in [facility], DM2 (A1c 7.3%), and HTN who initially presented to OSH with abdominal pain and n/v and found to have pancreatitis with lipase > 3000 though also with elevated troponins. CT abdomen revealed acute pancreatitis and his initial LVEF was 40-45%. He developed MSOF with hypoxic respiratory failure requiring intubation and ERIC and went into hypoxic PEA arrest requiring ACLS and due to persistent hypoxia and hypotension on pressors after ROSC was cannulated to VA ECMO (LFV, RFA, no anterograde perfusion catheter) on 11/25. Notably CTH that day revealed small subdural hemorrhage.     His post arrest TTE on 11/26 showed a signficantly worse LVEF of 5-10% with an AV that was only minimally opening. Since then he has had improvement in his LV function (now ~35-40%) and improved pulsatility while tolerating progressive slow ECMO weans. ECMO turndown (note in chart 11/30) was reassuring for ability to decannulate to IABP/inotropes. LHC 12/06 showed closure of his 3 vein grafts with graft to LAD patent though with distal native disease. IABP placed, ECMO successfully decannulated 12/08 (transfused 2u pRBCs during). His ARDS is improving. Currently on CVVH.    Previous cardiac studies:  LHC (12/06/22) - patent LIMA-LAD, RCA , LCx , aortogram w/ closure of 3 vein grafts, LVEDP 22 mm Hg, left-to-left and left-to-right collaterals  TTE (12/03/22) - mod-to-sev segmental LVSD (inferolateral, inferior, inferoseptal hypokinesis)    Hemodynamics:  12/14 IABP 1:1, milrinone 0.1, aDBP 105, aMAP83, lactate 0.8, uDQ937.6%, CO 5.9, CI 2.7, SVR 1000,  1.0  12/13 IABP 1:1, milrinone 0.1, aDBP 101, aMAP 85, CVP 5, lactate 1.1, mVO2 71.2%, CO 7.7, CI 3.4,   12/12 IABP 1:1, aDBP 103, aMAP 84, CVP 7, lactate 1.3, mVO2 79.3%, CO 10.5, CI 5.0,   12/09 IABP 1:1, aDBP 126, aMAP 92, CVP 6, lactate 1.2  12/08 IABP 1:1, aDBP 120, aMAP 87, CVP 5  12/07 IABP 1:1 ECMO 3600 RPM 4.0 LPM, aMAP 90, aDBP 123, mVO2 66.8%, CVP 7, CO 6.8, CI 3.3    *** INCOMPLETE NOTE *** INCOMPLETE NOTE *** INCOMPLETE NOTE *** INCOMPLETE NOTE *** INCOMPLETE NOTE *** INCOMPLETE NOTE *** INCOMPLETE NOTE *** INCOMPLETE NOTE *** INCOMPLETE NOTE *** INCOMPLETE NOTE *** INCOMPLETE NOTE *** INCOMPLETE NOTE *** INCOMPLETE NOTE *** INCOMPLETE NOTE *** INCOMPLETE NOTE *** INCOMPLETE NOTE *** INCOMPLETE NOTE *** INCOMPLETE NOTE *** INCOMPLETE NOTE *** INCOMPLETE NOTE ***  RECS BELOW ARE NOT TO BE FOLLOWED UNTIL FINALIZED  amio 200 PO, atorva 80, CRRT, hep gtt, mil 0.1, vaso gtt  dexmed gtt,   flucon, zaina, vanc    Problem/Plan - 1:  ·  Problem: Non-ST elevation MI (NSTEMI).   ·  Plan:   - troponin peaked at > 44908   - [facility] 12/06 with IABP placement revealed that 3 grafts are closed w/ distal native disease from remaining LAD graft  - continue statin  - would start ASA when okay with surgical team.    Problem/Plan - 2:  ·  Problem: Gall stone pancreatitis.   ·  Plan:   - CT abd showing acute pancreatitis  - RUQ showing sludge, no stones  - lipase > 3000 11/24, normalized prior and now fluctuating  - conservative care  - appreciate GI recs, no intervention planned   - surgery following   - appreciate ID recs.    Problem/Plan - 3:  ·  Problem: ERIC (acute kidney injury).   ·  Plan:   - likely arrest associated ATN, hypovolemia   - renal following, c/t CVVH.    Problem/Plan - 4:  ·  Problem: Cardiogenic shock.   ·  Plan:   - ECMO decannulated 12/08 after successful wean  - IABP in place, milrinone 0.1, on 1:1, wean as able, target augmented MAP 70s though will defer afterload agent titration while there is c/f developing sepsis  - cont hep gtt for AC while on IABP.  - repeat limited TTE to assess for degree of MR with and without IABP support today.    Problem/Plan - 5:  ·  Problem: Nontraumatic subdural hematoma.   ·  Plan:   - small 3mm subdural hemorrhage stable on repeat CTs  - okay to continue heparin, appreciate neuro recs  - keep MAP < 75 mmHg and careful monitoring of PTT.    Problem/Plan - 6:  ·  Problem: Acute respiratory failure with hypoxia.   ·  Plan:  - CXR improved with more lung volumes after bronch and increased PEEP  - bronch showed erythematous airways with scant bleeding  - extubate when able  - continue treatment for pancreatitis related ARDS.    Problem/Plan - 7:  ·  Problem: Supraventricular tachycardia.   ·  Plan:   - on review of tele, it appears to be sinus tach  - consider stopping amio.    Problem/Plan - 8:  ·  Problem: Fever.   ·  Plan:   - f/u ID recs, multiple potential sources given recent lines/procedures, as well as possible biliary source  - consider removing non-vital lines  - f/u pan cultures and c/t abx.    Patient remains critically ill. Currently supported with milrinone 0.1 and IABP 1:1 with adequate central sats. Case has been discussed with structural heart team, plan is for tentative mitral clip friday .

## 2022-12-16 NOTE — PRE-ANESTHESIA EVALUATION ADULT - NSANTHPMHFT_GEN_ALL_CORE
·  61 yo M w/ PMHx of CAD s/p CABG, DM, HTN, HLD, GERD presenting as transfer from [place] for c/f STEMI in setting of acute cholecystitis and gallstone pancreatitis. Acute hypoxemic respiratory failure thought to be secondary to ARDS vs cardiogenic shock s/p intubation. Worsening hemodynamics due to cardiogenic shock s/p VA ECMO with subsequent decannulation on 12/8, now with IABP in place and inotropic support. Of ntoe, pt with ERIC on CVVH for volume overload, empiric abx for presumed infection of unknown source, and small SDH noted w/ likely resolution on follow-up scans. s/p mitral clip today. ·  63 yo M w/ PMHx of CAD s/p CABG, DM, HTN, HLD, GERD presenting as transfer from [place] for c/f STEMI in setting of acute cholecystitis and gallstone pancreatitis. Acute hypoxemic respiratory failure thought to be secondary to ARDS vs cardiogenic shock s/p intubation. Worsening hemodynamics due to cardiogenic shock s/p VA ECMO with subsequent decannulation on 12/8, now with IABP in place and inotropic support. Of ntoe, pt with ERIC on CVVH for volume overload, empiric abx for presumed infection of unknown source, and small SDH noted w/ likely resolution on follow-up scans. s/p mitral clip today. ·  63 yo M w/ PMHx of CAD s/p CABG, DM, HTN, HLD, GERD presenting as transfer from [place] for c/f STEMI in setting of acute cholecystitis and gallstone pancreatitis. Acute hypoxemic respiratory failure thought to be secondary to ARDS vs cardiogenic shock s/p intubation. Worsening hemodynamics due to cardiogenic shock s/p VA ECMO with subsequent decannulation on 12/8, now with IABP in place and inotropic support. Of ntoe, pt with ERIC on CVVH for volume overload, empiric abx for presumed infection of unknown source, and small SDH noted w/ likely resolution on follow-up scans. s/p mitral clip today.

## 2022-12-16 NOTE — PROGRESS NOTE ADULT - ASSESSMENT
61YO M with PMH of  CABG, DM, HTN, HLD presenting as transfer from [place] for STEMI. Course c/b gallstone pancreatitis leading to ARDS, shock, cardiac arrest then placed on VA ECMO. Pt underwent ECMO decannulation. Pt failed weaning trials and is not a candidate for extubation as per primary team. Now S/P #7 Proximal XLT Tracheostomy POD # 0. In OR Surgicel placed around the stoma. Doing well on the ventilator.  PEEP:5, FiO2:50%.  63YO M with PMH of  CABG, DM, HTN, HLD presenting as transfer from [place] for STEMI. Course c/b gallstone pancreatitis leading to ARDS, shock, cardiac arrest then placed on VA ECMO. Pt underwent ECMO decannulation. Pt failed weaning trials and is not a candidate for extubation as per primary team. Now S/P #7 Proximal XLT Tracheostomy POD # 0. In OR Surgicel placed around the stoma. Doing well on the ventilator.  PEEP:5, FiO2:50%.  61YO M with PMH of  CABG, DM, HTN, HLD presenting as transfer from [place] for STEMI. Course c/b gallstone pancreatitis leading to ARDS, shock, cardiac arrest then placed on VA ECMO. Pt underwent ECMO decannulation. Pt failed weaning trials and is not a candidate for extubation as per primary team. Now S/P #7 Proximal XLT Tracheostomy POD # 0. In OR Surgicel placed around the stoma. Doing well on the ventilator.  PEEP:5, FiO2:50%.  63 YO M with PMH of CABG, DM, HTN, HLD presenting as transfer from [place] for STEMI. Course c/b gallstone pancreatitis leading to ARDS, shock, cardiac arrest then placed on VA ECMO. Pt underwent ECMO decannulation. Pt failed weaning trials and is not a candidate for extubation as per primary team. Now S/P #7 Proximal XLT Tracheostomy POD # 0. In OR Surgicel placed around the stoma. Doing well on the ventilator.  PEEP:5, FiO2:50%.  63 YO M with PMH of CABG, DM, HTN, HLD presenting as transfer from [place] for STEMI. Course c/b gallstone pancreatitis leading to ARDS, shock, cardiac arrest then placed on VA ECMO. Pt underwent ECMO decannulation. Pt failed weaning trials and is not a candidate for extubation as per primary team. Now S/P #7 Proximal XLT Tracheostomy POD # 0. In OR Surgicel placed around the stoma. Doing well on the ventilator.  PEEP:5, FiO2:50%.  61 YO M with PMH of CABG, DM, HTN, HLD presenting as transfer from [place] for STEMI. Course c/b gallstone pancreatitis leading to ARDS, shock, cardiac arrest then placed on VA ECMO. Pt underwent ECMO decannulation. Pt failed weaning trials and is not a candidate for extubation as per primary team. Now S/P #7 Proximal XLT Tracheostomy POD # 0. In OR Surgicel placed around the stoma. Doing well on the ventilator.  PEEP:5, FiO2:50%.

## 2022-12-16 NOTE — PROGRESS NOTE ADULT - ATTENDING COMMENTS
ERIC ATN  Cardiogenic shock  s/p mitral clip today 12/16  Continue CRRT  Change bath to 0K now out of OR K 5.7  Dw CTS    Melissa Mercer MD  Off: [phone]  contact me on teams    (After 5 pm or on weekends please page the on-call fellow/attending, can check [email or web address] for schedule. Login is carmen cantor, schedule under [facility] medicine, psych, derm) ERIC ATN  Cardiogenic shock  s/p mitral clip today 12/16  Continue CRRT  Change bath to 0K now out of OR K 5.7  Dw CTS    Melissa Mercer MD  Off: [phone]  contact me on teams    (After 5 pm or on weekends please page the on-call fellow/attending, can check [email or web address] for schedule. Login is carmen cantor, schedule under [facility] medicine, psych, derm) ERIC ATN  Cardiogenic shock  s/p mitral clip today 12/16  Continue CRRT  Change bath to 0K now out of OR K 5.7  Dw CTS    Melissa Mercer MD  Off: [phone]  contact me on teams    (After 5 pm or on weekends please page the on-call fellow/attending, can check [email or web address] for schedule. Login is carmen cantor, schedule under [facility] medicine, psych, derm)

## 2022-12-16 NOTE — PROGRESS NOTE ADULT - SUBJECTIVE AND OBJECTIVE BOX
CARDIOLOGY CONSULT PROGRESS NOTE  EDUARDO REAL  MRN-[id number]    INTERVAL EVENTS:  - tele:   -     ROS:  Negative, except as above.    MEDICATIONS  (STANDING):  albuterol/ipratropium for Nebulization 3 milliLiter(s) Nebulizer every 6 hours  aMIOdarone    Tablet   Oral   aMIOdarone    Tablet 200 milliGRAM(s) Oral daily  atorvastatin 80 milliGRAM(s) Oral at bedtime  chlorhexidine 0.12% Liquid 15 milliLiter(s) Oral Mucosa every 12 hours  chlorhexidine 2% Cloths 1 Application(s) Topical <User Schedule>  CRRT Treatment    <Continuous>  dexMEDEtomidine Infusion 0.3 MICROgram(s)/kG/Hr (6.97 mL/Hr) IV Continuous <Continuous>  fluconAZOLE IVPB      fluconAZOLE IVPB 200 milliGRAM(s) IV Intermittent every 24 hours  heparin  Infusion 800 Unit(s)/Hr (10 mL/Hr) IV Continuous <Continuous>  heparin  Infusion Syringe 300 Unit(s)/Hr (0.6 mL/Hr) CRRT <Continuous>  insulin lispro (ADMELOG) corrective regimen sliding scale   SubCutaneous every 4 hours  meropenem  IVPB 1000 milliGRAM(s) IV Intermittent every 12 hours  metoclopramide Injectable 5 milliGRAM(s) IV Push every 8 hours  milrinone Infusion 0.1 MICROgram(s)/kG/Min (2.79 mL/Hr) IV Continuous <Continuous>  multivitamin/minerals/iron Oral Solution (CENTRUM) 15 milliLiter(s) Oral daily  pantoprazole  Injectable 40 milliGRAM(s) IV Push daily  Phoxillum Filtration BK 4 / 2.5 5000 milliLiter(s) (2400 mL/Hr) CRRT <Continuous>  Phoxillum Filtration BK 4 / 2.5 5000 milliLiter(s) (800 mL/Hr) CRRT <Continuous>  polyethylene glycol 3350 17 Gram(s) Oral two times a day  PrismaSOL Filtration BGK 4 / 2.5 5000 milliLiter(s) (200 mL/Hr) CRRT <Continuous>  senna 2 Tablet(s) Oral at bedtime  sodium chloride 0.9%. 1000 milliLiter(s) (5 mL/Hr) IV Continuous <Continuous>  thiamine 100 milliGRAM(s) Enteral Tube daily  vancomycin  IVPB 500 milliGRAM(s) IV Intermittent every 24 hours  vasopressin Infusion 0.02 Unit(s)/Min (3 mL/Hr) IV Continuous <Continuous>    MEDICATIONS  (PRN):  acetaminophen    Suspension .. 650 milliGRAM(s) Enteral Tube every 6 hours PRN Temp greater or equal to 38C (100.4F), Mild Pain (1 - 3)  lidocaine   4% Patch 1 Patch Transdermal daily PRN pain    Allergies  No Known Allergies    P/E:  Vital Signs Last 24 Hrs  T(C): 37.4 (16 Dec 2022 05:27), Max: 37.4 (16 Dec 2022 05:27)  T(F): 99.3 (16 Dec 2022 05:27), Max: 99.3 (16 Dec 2022 05:27)  HR: 102 (16 Dec 2022 06:00) (92 - 107)  BP: 116/32 (16 Dec 2022 05:27) (108/54 - 116/32)  BP(mean): 76 (15 Dec 2022 20:52) (76 - 76)  RR: 11 (16 Dec 2022 06:00) (10 - 28)  SpO2: 100% (16 Dec 2022 06:00) (98% - 100%)    Patient On (Oxygen Delivery Method): ventilator    Daily Height in cm: 172.7 (16 Dec 2022 05:27)    Daily Weight in k.2 (16 Dec 2022 00:00)    I&O's Detail  15 Dec 2022 07:01  -  16 Dec 2022 07:00  IN:    Dexmedetomidine: 228 mL    Enteral Tube Flush: 90 mL    Glucerna: 1040 mL    Heparin: 206.5 mL    IV PiggyBack: 450 mL    Milrinone: 67.2 mL    sodium chloride 0.9%: 120 mL  Total IN: 2201.7 mL  OUT:    Indwelling Catheter - Urethral (mL): 25 mL    Other (mL): 5871 mL    Vasopressin: 0 mL  Total OUT: 5896 mL  Total NET: -3694.3 mL    I&O's Summary  15 Dec 2022 07:01  -  16 Dec 2022 07:00  --------------------------------------------------------  IN: 2201.7 mL / OUT: 5896 mL / NET: -3694.3 mL    Gen: intubated, laying in hospital bed, rousable and follows commands  HEENT: NCAT, ETT in place, MMM  CV: tachycardic; systolic murmur, S1/S2  Resp: mechanical BS  Abd: soft, NT, hypoactive BS  Ext:  WWP, PPP, IABP L groin,+MESERET    RELEVANT RECENT LABS/IMAGING/STUDIES:                        8.8    12.42 )-----------( 104      ( 16 Dec 2022 00:24 )             28.4     12    137  |  103  |  19  ----------------------------<  152<H>  4.5   |  22  |  1.41<H>    Ca    8.0<L>      16 Dec 2022 00:23  Phos  4.3       Mg     2.6         TPro  6.6  /  Alb  3.1<L>  /  TBili  1.0  /  DBili  x   /  AST  39  /  ALT  17  /  AlkPhos  112  -    LIVER FUNCTIONS - ( 16 Dec 2022 00:23 )  Alb: 3.1 g/dL / Pro: 6.6 g/dL / ALK PHOS: 112 U/L / ALT: 17 U/L / AST: 39 U/L / GGT: x           PT/INR - ( 16 Dec 2022 00:23 )   PT: 12.4 sec;   INR: 1.07 ratio       PTT - ( 16 Dec 2022 04:28 )  PTT:52.9 sec    ABG - ( 16 Dec 2022 00:01 )  pH, Arterial: 7.42  pH, Blood: x     /  pCO2: 34    /  pO2: 114   / HCO3: 22    / Base Excess: -2.0  /  SaO2: 98.4     CARDIOLOGY CONSULT PROGRESS NOTE  EDUARDO REAL  [id number]    INTERVAL EVENTS:  - tele:   -     ROS:  Negative, except as above.    MEDICATIONS  (STANDING):  albuterol/ipratropium for Nebulization 3 milliLiter(s) Nebulizer every 6 hours  aMIOdarone    Tablet   Oral   aMIOdarone    Tablet 200 milliGRAM(s) Oral daily  atorvastatin 80 milliGRAM(s) Oral at bedtime  chlorhexidine 0.12% Liquid 15 milliLiter(s) Oral Mucosa every 12 hours  chlorhexidine 2% Cloths 1 Application(s) Topical <User Schedule>  CRRT Treatment    <Continuous>  dexMEDEtomidine Infusion 0.3 MICROgram(s)/kG/Hr (6.97 mL/Hr) IV Continuous <Continuous>  fluconAZOLE IVPB      fluconAZOLE IVPB 200 milliGRAM(s) IV Intermittent every 24 hours  heparin  Infusion 800 Unit(s)/Hr (10 mL/Hr) IV Continuous <Continuous>  heparin  Infusion Syringe 300 Unit(s)/Hr (0.6 mL/Hr) CRRT <Continuous>  insulin lispro (ADMELOG) corrective regimen sliding scale   SubCutaneous every 4 hours  meropenem  IVPB 1000 milliGRAM(s) IV Intermittent every 12 hours  metoclopramide Injectable 5 milliGRAM(s) IV Push every 8 hours  milrinone Infusion 0.1 MICROgram(s)/kG/Min (2.79 mL/Hr) IV Continuous <Continuous>  multivitamin/minerals/iron Oral Solution (CENTRUM) 15 milliLiter(s) Oral daily  pantoprazole  Injectable 40 milliGRAM(s) IV Push daily  Phoxillum Filtration BK 4 / 2.5 5000 milliLiter(s) (2400 mL/Hr) CRRT <Continuous>  Phoxillum Filtration BK 4 / 2.5 5000 milliLiter(s) (800 mL/Hr) CRRT <Continuous>  polyethylene glycol 3350 17 Gram(s) Oral two times a day  PrismaSOL Filtration BGK 4 / 2.5 5000 milliLiter(s) (200 mL/Hr) CRRT <Continuous>  senna 2 Tablet(s) Oral at bedtime  sodium chloride 0.9%. 1000 milliLiter(s) (5 mL/Hr) IV Continuous <Continuous>  thiamine 100 milliGRAM(s) Enteral Tube daily  vancomycin  IVPB 500 milliGRAM(s) IV Intermittent every 24 hours  vasopressin Infusion 0.02 Unit(s)/Min (3 mL/Hr) IV Continuous <Continuous>    MEDICATIONS  (PRN):  acetaminophen    Suspension .. 650 milliGRAM(s) Enteral Tube every 6 hours PRN Temp greater or equal to 38C (100.4F), Mild Pain (1 - 3)  lidocaine   4% Patch 1 Patch Transdermal daily PRN pain    Allergies  No Known Allergies    P/E:  Vital Signs Last 24 Hrs  T(C): 37.4 (16 Dec 2022 05:27), Max: 37.4 (16 Dec 2022 05:27)  T(F): 99.3 (16 Dec 2022 05:27), Max: 99.3 (16 Dec 2022 05:27)  HR: 102 (16 Dec 2022 06:00) (92 - 107)  BP: 116/32 (16 Dec 2022 05:27) (108/54 - 116/32)  BP(mean): 76 (15 Dec 2022 20:52) (76 - 76)  RR: 11 (16 Dec 2022 06:00) (10 - 28)  SpO2: 100% (16 Dec 2022 06:00) (98% - 100%)    Patient On (Oxygen Delivery Method): ventilator    Daily Height in cm: 172.7 (16 Dec 2022 05:27)    Daily Weight in k.2 (16 Dec 2022 00:00)    I&O's Detail  15 Dec 2022 07:01  -  16 Dec 2022 07:00  IN:    Dexmedetomidine: 228 mL    Enteral Tube Flush: 90 mL    Glucerna: 1040 mL    Heparin: 206.5 mL    IV PiggyBack: 450 mL    Milrinone: 67.2 mL    sodium chloride 0.9%: 120 mL  Total IN: 2201.7 mL  OUT:    Indwelling Catheter - Urethral (mL): 25 mL    Other (mL): 5871 mL    Vasopressin: 0 mL  Total OUT: 5896 mL  Total NET: -3694.3 mL    I&O's Summary  15 Dec 2022 07:01  -  16 Dec 2022 07:00  --------------------------------------------------------  IN: 2201.7 mL / OUT: 5896 mL / NET: -3694.3 mL    Gen: intubated, laying in hospital bed, rousable and follows commands  HEENT: NCAT, ETT in place, MMM  CV: tachycardic; systolic murmur, S1/S2  Resp: mechanical BS  Abd: soft, NT, hypoactive BS  Ext:  WWP, PPP, IABP L groin,+MESERET    RELEVANT RECENT LABS/IMAGING/STUDIES:                        8.8    12.42 )-----------( 104      ( 16 Dec 2022 00:24 )             28.4     12    137  |  103  |  19  ----------------------------<  152<H>  4.5   |  22  |  1.41<H>    Ca    8.0<L>      16 Dec 2022 00:23  Phos  4.3       Mg     2.6         TPro  6.6  /  Alb  3.1<L>  /  TBili  1.0  /  DBili  x   /  AST  39  /  ALT  17  /  AlkPhos  112  -    LIVER FUNCTIONS - ( 16 Dec 2022 00:23 )  Alb: 3.1 g/dL / Pro: 6.6 g/dL / ALK PHOS: 112 U/L / ALT: 17 U/L / AST: 39 U/L / GGT: x           PT/INR - ( 16 Dec 2022 00:23 )   PT: 12.4 sec;   INR: 1.07 ratio       PTT - ( 16 Dec 2022 04:28 )  PTT:52.9 sec    ABG - ( 16 Dec 2022 00:01 )  pH, Arterial: 7.42  pH, Blood: x     /  pCO2: 34    /  pO2: 114   / HCO3: 22    / Base Excess: -2.0  /  SaO2: 98.4     CARDIOLOGY CONSULT PROGRESS NOTE  EDUARDO REAL  [id number]    INTERVAL EVENTS:  - tele:   -     ROS:  Negative, except as above.    MEDICATIONS  (STANDING):  albuterol/ipratropium for Nebulization 3 milliLiter(s) Nebulizer every 6 hours  aMIOdarone    Tablet   Oral   aMIOdarone    Tablet 200 milliGRAM(s) Oral daily  atorvastatin 80 milliGRAM(s) Oral at bedtime  chlorhexidine 0.12% Liquid 15 milliLiter(s) Oral Mucosa every 12 hours  chlorhexidine 2% Cloths 1 Application(s) Topical <User Schedule>  CRRT Treatment    <Continuous>  dexMEDEtomidine Infusion 0.3 MICROgram(s)/kG/Hr (6.97 mL/Hr) IV Continuous <Continuous>  fluconAZOLE IVPB      fluconAZOLE IVPB 200 milliGRAM(s) IV Intermittent every 24 hours  heparin  Infusion 800 Unit(s)/Hr (10 mL/Hr) IV Continuous <Continuous>  heparin  Infusion Syringe 300 Unit(s)/Hr (0.6 mL/Hr) CRRT <Continuous>  insulin lispro (ADMELOG) corrective regimen sliding scale   SubCutaneous every 4 hours  meropenem  IVPB 1000 milliGRAM(s) IV Intermittent every 12 hours  metoclopramide Injectable 5 milliGRAM(s) IV Push every 8 hours  milrinone Infusion 0.1 MICROgram(s)/kG/Min (2.79 mL/Hr) IV Continuous <Continuous>  multivitamin/minerals/iron Oral Solution (CENTRUM) 15 milliLiter(s) Oral daily  pantoprazole  Injectable 40 milliGRAM(s) IV Push daily  Phoxillum Filtration BK 4 / 2.5 5000 milliLiter(s) (2400 mL/Hr) CRRT <Continuous>  Phoxillum Filtration BK 4 / 2.5 5000 milliLiter(s) (800 mL/Hr) CRRT <Continuous>  polyethylene glycol 3350 17 Gram(s) Oral two times a day  PrismaSOL Filtration BGK 4 / 2.5 5000 milliLiter(s) (200 mL/Hr) CRRT <Continuous>  senna 2 Tablet(s) Oral at bedtime  sodium chloride 0.9%. 1000 milliLiter(s) (5 mL/Hr) IV Continuous <Continuous>  thiamine 100 milliGRAM(s) Enteral Tube daily  vancomycin  IVPB 500 milliGRAM(s) IV Intermittent every 24 hours  vasopressin Infusion 0.02 Unit(s)/Min (3 mL/Hr) IV Continuous <Continuous>    MEDICATIONS  (PRN):  acetaminophen    Suspension .. 650 milliGRAM(s) Enteral Tube every 6 hours PRN Temp greater or equal to 38C (100.4F), Mild Pain (1 - 3)  lidocaine   4% Patch 1 Patch Transdermal daily PRN pain    Allergies  No Known Allergies    P/E:  Vital Signs Last 24 Hrs  T(C): 37.4 (16 Dec 2022 05:27), Max: 37.4 (16 Dec 2022 05:27)  T(F): 99.3 (16 Dec 2022 05:27), Max: 99.3 (16 Dec 2022 05:27)  HR: 102 (16 Dec 2022 06:00) (92 - 107)  BP: 116/32 (16 Dec 2022 05:27) (108/54 - 116/32)  BP(mean): 76 (15 Dec 2022 20:52) (76 - 76)  RR: 11 (16 Dec 2022 06:00) (10 - 28)  SpO2: 100% (16 Dec 2022 06:00) (98% - 100%)    Patient On (Oxygen Delivery Method): ventilator    Daily Height in cm: 172.7 (16 Dec 2022 05:27)    Daily Weight in k.2 (16 Dec 2022 00:00)    I&O's Detail  15 Dec 2022 07:01  -  16 Dec 2022 07:00  IN:    Dexmedetomidine: 228 mL    Enteral Tube Flush: 90 mL    Glucerna: 1040 mL    Heparin: 206.5 mL    IV PiggyBack: 450 mL    Milrinone: 67.2 mL    sodium chloride 0.9%: 120 mL  Total IN: 2201.7 mL  OUT:    Indwelling Catheter - Urethral (mL): 25 mL    Other (mL): 5871 mL    Vasopressin: 0 mL  Total OUT: 5896 mL  Total NET: -3694.3 mL    I&O's Summary  15 Dec 2022 07:01  -  16 Dec 2022 07:00  --------------------------------------------------------  IN: 2201.7 mL / OUT: 5896 mL / NET: -3694.3 mL    Gen: intubated, laying in hospital bed, rousable and follows commands  HEENT: NCAT, ETT in place, MMM  CV: tachycardic; systolic murmur, S1/S2  Resp: mechanical BS  Abd: soft, NT, hypoactive BS  Ext:  WWP, PPP, IABP L groin,+MESERET    RELEVANT RECENT LABS/IMAGING/STUDIES:                        8.8    12.42 )-----------( 104      ( 16 Dec 2022 00:24 )             28.4     12    137  |  103  |  19  ----------------------------<  152<H>  4.5   |  22  |  1.41<H>    Ca    8.0<L>      16 Dec 2022 00:23  Phos  4.3       Mg     2.6         TPro  6.6  /  Alb  3.1<L>  /  TBili  1.0  /  DBili  x   /  AST  39  /  ALT  17  /  AlkPhos  112  -    LIVER FUNCTIONS - ( 16 Dec 2022 00:23 )  Alb: 3.1 g/dL / Pro: 6.6 g/dL / ALK PHOS: 112 U/L / ALT: 17 U/L / AST: 39 U/L / GGT: x           PT/INR - ( 16 Dec 2022 00:23 )   PT: 12.4 sec;   INR: 1.07 ratio       PTT - ( 16 Dec 2022 04:28 )  PTT:52.9 sec    ABG - ( 16 Dec 2022 00:01 )  pH, Arterial: 7.42  pH, Blood: x     /  pCO2: 34    /  pO2: 114   / HCO3: 22    / Base Excess: -2.0  /  SaO2: 98.4

## 2022-12-16 NOTE — PRE-OP CHECKLIST - SELECT TESTS ORDERED
CBC/CMP/PT/PTT/Type and Screen/Urinalysis/POCT Blood Glucose/COVID-19 CBC/CMP/PT/PTT/INR/Hepatic Function/Type and Cross/Type and Screen/Urinalysis/POCT Blood Glucose/COVID-19

## 2022-12-16 NOTE — PROGRESS NOTE ADULT - ASSESSMENT
62M CAD, CABG, DM, HTN, chol admitted with pancreatitis not necrotizing on the CT scan and cholecystitis.  Sent to SICU but developed respiratory failure presumed to be due to ARDS. Moved to the CTU for ECMO for cardiogenic shock vs ARDS.  ECMO out, IABP placed.  Leukocytosis    Fever, rising leucocytosis, SIRS/sepsis, ERIC  continue meropenem vanco  level ok    diflucan added   - repeat UC and BC sent    ct scan with improving pancreatitis   sp mv clip     cultures are negative    to follow off ab and reculture for any clinical change    discussed with team in detail       discussed with ctu

## 2022-12-16 NOTE — PROGRESS NOTE ADULT - SUBJECTIVE AND OBJECTIVE BOX
Patient seen and examined at the bedside.    Remained critically ill on continuous ICU monitoring.      Brief Summary:  Cardiogenic shock s/p [facility] ECMO decannulated 12/8/22. Currently supported with an IABP.        24 Hour events:      OBJECTIVE:  Vital Signs Last 24 Hrs  T(C): 37.4 (16 Dec 2022 05:27), Max: 37.4 (16 Dec 2022 05:27)  T(F): 99.3 (16 Dec 2022 05:27), Max: 99.3 (16 Dec 2022 05:27)  HR: 105 (16 Dec 2022 07:57) (92 - 107)  BP: 116/32 (16 Dec 2022 05:27) (108/54 - 116/32)  BP(mean): 76 (15 Dec 2022 20:52) (76 - 76)  RR: 11 (16 Dec 2022 06:00) (10 - 28)  SpO2: 100% (16 Dec 2022 07:57) (98% - 100%)    Parameters below as of 16 Dec 2022 05:34  Patient On (Oxygen Delivery Method): ventilator        Mode: off              -------------------------------------------------------------------------------------------------------------------------------  Physical Exam:   GGeneral: Intubated, multiple lines gtt  Neurology: Following commands  Eyes: bilateral pupils equal and reactive   ENT/Neck: +ETT midline, Neck supple, trachea midline, No JVD   Respiratory: rhonchi bilaterally   CV: S1S2, no murmurs        [x] Sinus Tachycardia  Abdominal: Softly distended,+BS   Extremities: 1+ pedal edema noted, + peripheral pulses palpable, warm  Skin: No Rashes, Hematoma, Ecchymosis                                        8.8    12.42 )-----------( 104      ( 16 Dec 2022 00:24 )             28.4     12-16    137  |  103  |  19  ----------------------------<  152<H>  4.5   |  22  |  1.41<H>    Ca    8.0<L>      16 Dec 2022 00:23  Phos  4.3     12-16  Mg     2.6     12-16    TPro  6.6  /  Alb  3.1<L>  /  TBili  1.0  /  DBili  x   /  AST  39  /  ALT  17  /  AlkPhos  112  12-16    LIVER FUNCTIONS - ( 16 Dec 2022 00:23 )  Alb: 3.1 g/dL / Pro: 6.6 g/dL / ALK PHOS: 112 U/L / ALT: 17 U/L / AST: 39 U/L / GGT: x           PT/INR - ( 16 Dec 2022 00:23 )   PT: 12.4 sec;   INR: 1.07 ratio         PTT - ( 16 Dec 2022 04:28 )  PTT:52.9 sec  ABG - ( 16 Dec 2022 00:01 )  pH, Arterial: 7.42  pH, Blood: x     /  pCO2: 34    /  pO2: 114   / HCO3: 22    / Base Excess: -2.0  /  SaO2: 98.4              ------------------------------------------------------------------------------------------------------------------------------  Assessment:  63 y/o male with Pancreatitis and Cardiogenic shock    Acute respiratory distress/failure, intubated ,   Cardiogenic shock s/p [facility] ECMO decannulated 12/8  CAD/ASHD/CABG, acute MI  At high risk for hemodynamic instability  Acute kidney injury  Encounter management IABP  Encounter weaning ventilator  Leukocytosis   Anemia  Thrombocytopenia   Acute pancreatitis        Plan:   ***Neuro***  CT head showed 4mm subdural hematoma 11/26: repeat CT head unchanged 12/01  Soft palate laceration, ENT scoped 12/8, stable, no acute intervention at this time  Repeat Head CT 12/13 Similar minimal increased density along the right aspect of the posterior   falx and right tentorial leaf, consistent with minimal residual subdural   hemorrhage.  No parenchymal hemorrhage or brain edema.  Patient sedated w/ low dose Precedex   Acute pain control with Tylenol    ***Cardiovascular***  Invasive hemodynamic monitoring, assess perfusion indices   At high risk for hemodynamic instability and cardiac arrhythmias.  Lactate 1.1, continue trending   Continue low dose Milrinone for now.  Mitral regurgitation - plan for Mitral clip today or tomorrow.  L Fem IABP 1:1 with good augmentation - weaning trial likely after Mitral clip.  AC Therapy with Heparin gtt pAF/flutter   Amiodarone for rate control  c/w Statin         ***Pulmonary***  Acute respiratory failure  Wean ventilator as tolerated.  Plan for trach this morning           ***GI***  Protein calorie malnutrition - Tube feeds via nasal feeding tube  Pancreatitis   Protonix for stress ulcer prophylaxis   Bowel regimen with Miralax and Senna  Reglan for gut motility       ***Renal***  ERIC, renal following, continue CRRT  Volume removal as tolerated.  Replete electrolytes as indicated.        ***ID***  Recent fevers.  Vanco, Merrem, Diflucan - Following procedure can likely taper antibiotics to off if cultures remain negative.      ***Endocrine***  Hx of DM2, continue glycemic control w/ ISS.            Patient requires continuous monitoring with bedside rhythm monitoring, pulse oximetry monitoring, and continuous central venous and arterial pressure monitoring; and intermittent blood gas analysis. Care plan discussed with the ICU care team.   Patient remained critical, at risk for life threatening decompensation.    I have spent 30 minutes providing critical care management to this patient.    By signing my name below, I, Abhijit Ngo, attest that this documentation has been prepared under the direction and in the presence of Jade Rosas MD  Electronically signed: Brian Slaguhter, 12-16-22 @ 07:59    I, Jade Rosas MD, personally performed the services described in this documentation. all medical record entries made by the karlaibe were at my direction and in my presence. I have reviewed the chart and agree that the record reflects my personal performance and is accurate and complete  Electronically signed: Jade Rosas MD Patient seen and examined at the bedside.    Remained critically ill on continuous ICU monitoring.      Brief Summary:  Cardiogenic shock s/p [facility] ECMO decannulated 12/8/22. Currently supported with an IABP.        24 Hour events:      OBJECTIVE:  Vital Signs Last 24 Hrs  T(C): 37.4 (16 Dec 2022 05:27), Max: 37.4 (16 Dec 2022 05:27)  T(F): 99.3 (16 Dec 2022 05:27), Max: 99.3 (16 Dec 2022 05:27)  HR: 105 (16 Dec 2022 07:57) (92 - 107)  BP: 116/32 (16 Dec 2022 05:27) (108/54 - 116/32)  BP(mean): 76 (15 Dec 2022 20:52) (76 - 76)  RR: 11 (16 Dec 2022 06:00) (10 - 28)  SpO2: 100% (16 Dec 2022 07:57) (98% - 100%)    Parameters below as of 16 Dec 2022 05:34  Patient On (Oxygen Delivery Method): ventilator        Mode: off              -------------------------------------------------------------------------------------------------------------------------------  Physical Exam:   GGeneral: Intubated, multiple lines gtt  Neurology: Following commands  Eyes: bilateral pupils equal and reactive   ENT/Neck: +ETT midline, Neck supple, trachea midline, No JVD   Respiratory: rhonchi bilaterally   CV: S1S2, no murmurs        [x] Sinus Tachycardia  Abdominal: Softly distended,+BS   Extremities: 1+ pedal edema noted, + peripheral pulses palpable, warm  Skin: No Rashes, Hematoma, Ecchymosis                                        8.8    12.42 )-----------( 104      ( 16 Dec 2022 00:24 )             28.4     12-16    137  |  103  |  19  ----------------------------<  152<H>  4.5   |  22  |  1.41<H>    Ca    8.0<L>      16 Dec 2022 00:23  Phos  4.3     12-16  Mg     2.6     12-16    TPro  6.6  /  Alb  3.1<L>  /  TBili  1.0  /  DBili  x   /  AST  39  /  ALT  17  /  AlkPhos  112  12-16    LIVER FUNCTIONS - ( 16 Dec 2022 00:23 )  Alb: 3.1 g/dL / Pro: 6.6 g/dL / ALK PHOS: 112 U/L / ALT: 17 U/L / AST: 39 U/L / GGT: x           PT/INR - ( 16 Dec 2022 00:23 )   PT: 12.4 sec;   INR: 1.07 ratio         PTT - ( 16 Dec 2022 04:28 )  PTT:52.9 sec  ABG - ( 16 Dec 2022 00:01 )  pH, Arterial: 7.42  pH, Blood: x     /  pCO2: 34    /  pO2: 114   / HCO3: 22    / Base Excess: -2.0  /  SaO2: 98.4              ------------------------------------------------------------------------------------------------------------------------------  Assessment:  63 y/o male with Pancreatitis and Cardiogenic shock    Acute respiratory distress/failure, intubated ,   Cardiogenic shock s/p [facility] ECMO decannulated 12/8  CAD/ASHD/CABG, acute MI  At high risk for hemodynamic instability  Acute kidney injury  Encounter management IABP  Encounter weaning ventilator  Leukocytosis   Anemia  Thrombocytopenia   Acute pancreatitis        Plan:   ***Neuro***  CT head showed 4mm subdural hematoma 11/26: repeat CT head unchanged 12/01  Soft palate laceration, ENT scoped 12/8, stable, no acute intervention at this time  Repeat Head CT 12/13 Similar minimal increased density along the right aspect of the posterior   falx and right tentorial leaf, consistent with minimal residual subdural   hemorrhage.  No parenchymal hemorrhage or brain edema.  Patient sedated w/ low dose Precedex   Acute pain control with Tylenol    ***Cardiovascular***  Invasive hemodynamic monitoring, assess perfusion indices   At high risk for hemodynamic instability and cardiac arrhythmias.  Lactate 1.1, continue trending   Continue low dose Milrinone for now.  Mitral regurgitation - plan for Mitral clip today or tomorrow.  L Fem IABP 1:1 with good augmentation - weaning trial likely after Mitral clip.  AC Therapy with Heparin gtt pAF/flutter   Amiodarone for rate control  c/w Statin         ***Pulmonary***  Acute respiratory failure  Wean ventilator as tolerated.  Plan for trach this morning           ***GI***  Protein calorie malnutrition - Tube feeds via nasal feeding tube  Pancreatitis   Protonix for stress ulcer prophylaxis   Bowel regimen with Miralax and Senna  Reglan for gut motility       ***Renal***  ERIC, renal following, continue CRRT  Volume removal as tolerated.  Replete electrolytes as indicated.        ***ID***  Recent fevers.  Vanco, Merrem, Diflucan - Following procedure can likely taper antibiotics to off if cultures remain negative.      ***Endocrine***  Hx of DM2, continue glycemic control w/ ISS.            Patient requires continuous monitoring with bedside rhythm monitoring, pulse oximetry monitoring, and continuous central venous and arterial pressure monitoring; and intermittent blood gas analysis. Care plan discussed with the ICU care team.   Patient remained critical, at risk for life threatening decompensation.    I have spent 30 minutes providing critical care management to this patient.    By signing my name below, I, Abhijit Ngo, attest that this documentation has been prepared under the direction and in the presence of Jade Rosas MD  Electronically signed: Brian Slaughter, 12-16-22 @ 07:59    I, Jade Rosas MD, personally performed the services described in this documentation. all medical record entries made by the karlaibe were at my direction and in my presence. I have reviewed the chart and agree that the record reflects my personal performance and is accurate and complete  Electronically signed: Jade Rosas MD Patient seen and examined at the bedside.    Remained critically ill on continuous ICU monitoring.      Brief Summary:  Cardiogenic shock s/p [facility] ECMO decannulated 12/8/22. Currently supported with an IABP.        24 Hour events:  - Mitral clip inserted in OR today  - Tracheostomy done in the afternoon today        OBJECTIVE:  Vital Signs Last 24 Hrs  T(C): 37.4 (16 Dec 2022 05:27), Max: 37.4 (16 Dec 2022 05:27)  T(F): 99.3 (16 Dec 2022 05:27), Max: 99.3 (16 Dec 2022 05:27)  HR: 105 (16 Dec 2022 07:57) (92 - 107)  BP: 116/32 (16 Dec 2022 05:27) (108/54 - 116/32)  BP(mean): 76 (15 Dec 2022 20:52) (76 - 76)  RR: 11 (16 Dec 2022 06:00) (10 - 28)  SpO2: 100% (16 Dec 2022 07:57) (98% - 100%)    Parameters below as of 16 Dec 2022 05:34  Patient On (Oxygen Delivery Method): ventilator        Mode: off              -------------------------------------------------------------------------------------------------------------------------------  Physical Exam:   General: Intubated, multiple lines gtt  Neurology: Following commands  Eyes: bilateral pupils equal and reactive   ENT/Neck: +trach, Neck supple, trachea midline, No JVD   Respiratory: rhonchi bilaterally   CV: S1S2, no murmurs        [x] Sinus Tachycardia  Abdominal: Softly distended,+BS   Extremities: 1+ pedal edema noted, + peripheral pulses palpable, warm  Skin: No Rashes, Hematoma, Ecchymosis                                        8.8    12.42 )-----------( 104      ( 16 Dec 2022 00:24 )             28.4     12-16    137  |  103  |  19  ----------------------------<  152<H>  4.5   |  22  |  1.41<H>    Ca    8.0<L>      16 Dec 2022 00:23  Phos  4.3     12-16  Mg     2.6     12-16    TPro  6.6  /  Alb  3.1<L>  /  TBili  1.0  /  DBili  x   /  AST  39  /  ALT  17  /  AlkPhos  112  12-16    LIVER FUNCTIONS - ( 16 Dec 2022 00:23 )  Alb: 3.1 g/dL / Pro: 6.6 g/dL / ALK PHOS: 112 U/L / ALT: 17 U/L / AST: 39 U/L / GGT: x           PT/INR - ( 16 Dec 2022 00:23 )   PT: 12.4 sec;   INR: 1.07 ratio         PTT - ( 16 Dec 2022 04:28 )  PTT:52.9 sec  ABG - ( 16 Dec 2022 00:01 )  pH, Arterial: 7.42  pH, Blood: x     /  pCO2: 34    /  pO2: 114   / HCO3: 22    / Base Excess: -2.0  /  SaO2: 98.4              ------------------------------------------------------------------------------------------------------------------------------  Assessment:  63 y/o male with Pancreatitis and Cardiogenic shock    Acute respiratory distress/failure, intubated ,   Cardiogenic shock s/p [facility] ECMO decannulated 12/8  CAD/ASHD/CABG, acute MI  At high risk for hemodynamic instability  Acute kidney injury  Encounter management IABP  Encounter weaning ventilator  Leukocytosis   Anemia  Thrombocytopenia   Acute pancreatitis        Plan:   ***Neuro***  CT head showed 4mm subdural hematoma 11/26: repeat CT head unchanged 12/01  Soft palate laceration, ENT scoped 12/8, stable, no acute intervention at this time  Repeat Head CT 12/13 Similar minimal increased density along the right aspect of the posterior   falx and right tentorial leaf, consistent with minimal residual subdural   hemorrhage.  No parenchymal hemorrhage or brain edema.  Patient sedated w/ low dose Precedex   Acute pain control with Tylenol    ***Cardiovascular***  Invasive hemodynamic monitoring, assess perfusion indices   At high risk for hemodynamic instability and cardiac arrhythmias.  Lactate 1.1, continue trending   Continue low dose Milrinone for now.  Mitral regurgitation - Mitral clip inserted in the OR today   L Fem IABP 1:1 with good augmentation - weaning trial   AC Therapy with Heparin gtt pAF/flutter   Amiodarone for rate control  c/w Statin         ***Pulmonary***  Acute respiratory failure  Wean ventilator as tolerated.  Trached today this afternoon            ***GI***  Protein calorie malnutrition - Tube feeds via nasal feeding tube  Pancreatitis   Protonix for stress ulcer prophylaxis   Bowel regimen with Miralax and Senna  Reglan for gut motility       ***Renal***  ERIC, renal following, continue CRRT  Volume removal as tolerated.  Replete electrolytes as indicated.        ***ID***  Recent fevers.  Vanco, Merrem, Diflucan - Following procedure can likely taper antibiotics to off if cultures remain negative.      ***Endocrine***  Hx of DM2, continue glycemic control w/ ISS.            Patient requires continuous monitoring with bedside rhythm monitoring, pulse oximetry monitoring, and continuous central venous and arterial pressure monitoring; and intermittent blood gas analysis. Care plan discussed with the ICU care team.   Patient remained critical, at risk for life threatening decompensation.    I have spent 30 minutes providing critical care management to this patient.    By signing my name below, I, Abhijit Ngo, attest that this documentation has been prepared under the direction and in the presence of Jade Rosas MD  Electronically signed: Brian Slaughter, 12-16-22 @ 07:59    I, Jade Rosas MD, personally performed the services described in this documentation. all medical record entries made by the karlaibe were at my direction and in my presence. I have reviewed the chart and agree that the record reflects my personal performance and is accurate and complete  Electronically signed: Jade Rosas MD Patient seen and examined at the bedside.    Remained critically ill on continuous ICU monitoring.      Brief Summary:  Cardiogenic shock s/p [facility] ECMO decannulated 12/8/22. Currently supported with an IABP.        24 Hour events:  - Mitral clip inserted in OR today  - Tracheostomy done in the afternoon today        OBJECTIVE:  Vital Signs Last 24 Hrs  T(C): 37.4 (16 Dec 2022 05:27), Max: 37.4 (16 Dec 2022 05:27)  T(F): 99.3 (16 Dec 2022 05:27), Max: 99.3 (16 Dec 2022 05:27)  HR: 105 (16 Dec 2022 07:57) (92 - 107)  BP: 116/32 (16 Dec 2022 05:27) (108/54 - 116/32)  BP(mean): 76 (15 Dec 2022 20:52) (76 - 76)  RR: 11 (16 Dec 2022 06:00) (10 - 28)  SpO2: 100% (16 Dec 2022 07:57) (98% - 100%)    Parameters below as of 16 Dec 2022 05:34  Patient On (Oxygen Delivery Method): ventilator        Mode: off              -------------------------------------------------------------------------------------------------------------------------------  Physical Exam:   General: Intubated, multiple lines gtt  Neurology: Following commands  Eyes: bilateral pupils equal and reactive   ENT/Neck: +trach, Neck supple, trachea midline, No JVD   Respiratory: rhonchi bilaterally   CV: S1S2, no murmurs        [x] Sinus Tachycardia  Abdominal: Softly distended,+BS   Extremities: 1+ pedal edema noted, + peripheral pulses palpable, warm  Skin: No Rashes, Hematoma, Ecchymosis                                        8.8    12.42 )-----------( 104      ( 16 Dec 2022 00:24 )             28.4     12-16    137  |  103  |  19  ----------------------------<  152<H>  4.5   |  22  |  1.41<H>    Ca    8.0<L>      16 Dec 2022 00:23  Phos  4.3     12-16  Mg     2.6     12-16    TPro  6.6  /  Alb  3.1<L>  /  TBili  1.0  /  DBili  x   /  AST  39  /  ALT  17  /  AlkPhos  112  12-16    LIVER FUNCTIONS - ( 16 Dec 2022 00:23 )  Alb: 3.1 g/dL / Pro: 6.6 g/dL / ALK PHOS: 112 U/L / ALT: 17 U/L / AST: 39 U/L / GGT: x           PT/INR - ( 16 Dec 2022 00:23 )   PT: 12.4 sec;   INR: 1.07 ratio         PTT - ( 16 Dec 2022 04:28 )  PTT:52.9 sec  ABG - ( 16 Dec 2022 00:01 )  pH, Arterial: 7.42  pH, Blood: x     /  pCO2: 34    /  pO2: 114   / HCO3: 22    / Base Excess: -2.0  /  SaO2: 98.4              ------------------------------------------------------------------------------------------------------------------------------  Assessment:  63 y/o male with Pancreatitis and Cardiogenic shock    Acute respiratory distress/failure, intubated ,   Cardiogenic shock s/p VA ECMO decannulated 12/8  CAD/ASHD/CABG, acute MI  At high risk for hemodynamic instability  Acute kidney injury  Encounter management IABP  Encounter weaning ventilator  Leukocytosis   Anemia  Thrombocytopenia   Acute pancreatitis        Plan:   ***Neuro***  CT head showed 4mm subdural hematoma 11/26: repeat CT head unchanged 12/01  Soft palate laceration, ENT scoped 12/8, stable, no acute intervention at this time  Repeat Head CT 12/13 Similar minimal increased density along the right aspect of the posterior   falx and right tentorial leaf, consistent with minimal residual subdural   hemorrhage.  No parenchymal hemorrhage or brain edema.  Patient sedated w/ low dose Precedex   Acute pain control with Tylenol    ***Cardiovascular***  Invasive hemodynamic monitoring, assess perfusion indices   At high risk for hemodynamic instability and cardiac arrhythmias.  Lactate 1.1, continue trending   Continue low dose Milrinone for now.  Mitral regurgitation - Mitral clip inserted in the OR today   L Fem IABP 1:1 with good augmentation - weaning trial   AC Therapy with Heparin gtt pAF/flutter   Amiodarone for rate control  c/w Statin         ***Pulmonary***  Acute respiratory failure  Wean ventilator as tolerated.  Trached today this afternoon            ***GI***  Protein calorie malnutrition - Tube feeds via nasal feeding tube  Pancreatitis   Protonix for stress ulcer prophylaxis   Bowel regimen with Miralax and Senna  Reglan for gut motility       ***Renal***  ERIC, renal following, continue CRRT  Volume removal as tolerated.  Replete electrolytes as indicated.        ***ID***  Recent fevers.  Vanco, Merrem, Diflucan - Following procedure can likely taper antibiotics to off if cultures remain negative.      ***Endocrine***  Hx of DM2, continue glycemic control w/ ISS.            Patient requires continuous monitoring with bedside rhythm monitoring, pulse oximetry monitoring, and continuous central venous and arterial pressure monitoring; and intermittent blood gas analysis. Care plan discussed with the ICU care team.   Patient remained critical, at risk for life threatening decompensation.    I have spent 30 minutes providing critical care management to this patient.    By signing my name below, I, Abhijit Ngo, attest that this documentation has been prepared under the direction and in the presence of Jade Rosas MD  Electronically signed: Brian Slaughter, 12-16-22 @ 07:59    I, Jaed Rosas MD, personally performed the services described in this documentation. all medical record entries made by the karlaibe were at my direction and in my presence. I have reviewed the chart and agree that the record reflects my personal performance and is accurate and complete  Electronically signed: Jade Rosas MD Patient seen and examined at the bedside.    Remained critically ill on continuous ICU monitoring.      Brief Summary:  Cardiogenic shock s/p VA ECMO decannulated 12/8/22. Currently supported with an IABP.        24 Hour events:  - Mitral clip inserted in OR today  - Tracheostomy done in the afternoon today        OBJECTIVE:  Vital Signs Last 24 Hrs  T(C): 37.4 (16 Dec 2022 05:27), Max: 37.4 (16 Dec 2022 05:27)  T(F): 99.3 (16 Dec 2022 05:27), Max: 99.3 (16 Dec 2022 05:27)  HR: 105 (16 Dec 2022 07:57) (92 - 107)  BP: 116/32 (16 Dec 2022 05:27) (108/54 - 116/32)  BP(mean): 76 (15 Dec 2022 20:52) (76 - 76)  RR: 11 (16 Dec 2022 06:00) (10 - 28)  SpO2: 100% (16 Dec 2022 07:57) (98% - 100%)    Parameters below as of 16 Dec 2022 05:34  Patient On (Oxygen Delivery Method): ventilator        Mode: off              -------------------------------------------------------------------------------------------------------------------------------  Physical Exam:   General: Intubated, multiple lines gtt  Neurology: Following commands  Eyes: bilateral pupils equal and reactive   ENT/Neck: +trach, Neck supple, trachea midline, No JVD   Respiratory: rhonchi bilaterally   CV: S1S2, no murmurs        [x] Sinus Tachycardia  Abdominal: Softly distended,+BS   Extremities: 1+ pedal edema noted, + peripheral pulses palpable, warm  Skin: No Rashes, Hematoma, Ecchymosis                                        8.8    12.42 )-----------( 104      ( 16 Dec 2022 00:24 )             28.4     12-16    137  |  103  |  19  ----------------------------<  152<H>  4.5   |  22  |  1.41<H>    Ca    8.0<L>      16 Dec 2022 00:23  Phos  4.3     12-16  Mg     2.6     12-16    TPro  6.6  /  Alb  3.1<L>  /  TBili  1.0  /  DBili  x   /  AST  39  /  ALT  17  /  AlkPhos  112  12-16    LIVER FUNCTIONS - ( 16 Dec 2022 00:23 )  Alb: 3.1 g/dL / Pro: 6.6 g/dL / ALK PHOS: 112 U/L / ALT: 17 U/L / AST: 39 U/L / GGT: x           PT/INR - ( 16 Dec 2022 00:23 )   PT: 12.4 sec;   INR: 1.07 ratio         PTT - ( 16 Dec 2022 04:28 )  PTT:52.9 sec  ABG - ( 16 Dec 2022 00:01 )  pH, Arterial: 7.42  pH, Blood: x     /  pCO2: 34    /  pO2: 114   / HCO3: 22    / Base Excess: -2.0  /  SaO2: 98.4              ------------------------------------------------------------------------------------------------------------------------------  Assessment:  61 y/o male with Pancreatitis and Cardiogenic shock    Acute respiratory distress/failure, intubated ,   Cardiogenic shock s/p VA ECMO decannulated 12/8  CAD/ASHD/CABG, acute MI  At high risk for hemodynamic instability  Acute kidney injury  Encounter management IABP  Encounter weaning ventilator  Leukocytosis   Anemia  Thrombocytopenia   Acute pancreatitis        Plan:   ***Neuro***  CT head showed 4mm subdural hematoma 11/26: repeat CT head unchanged 12/01  Soft palate laceration, ENT scoped 12/8, stable, no acute intervention at this time  Repeat Head CT 12/13 Similar minimal increased density along the right aspect of the posterior   falx and right tentorial leaf, consistent with minimal residual subdural   hemorrhage.  No parenchymal hemorrhage or brain edema.  Patient sedated w/ low dose Precedex   Acute pain control with Tylenol    ***Cardiovascular***  Invasive hemodynamic monitoring, assess perfusion indices   At high risk for hemodynamic instability and cardiac arrhythmias.  Lactate 1.1, continue trending   Continue low dose Milrinone for now.  Mitral regurgitation - Mitral clip inserted in the OR today   L Fem IABP 1:1 with good augmentation - weaning trial   AC Therapy with Heparin gtt pAF/flutter   Amiodarone for rate control  c/w Statin         ***Pulmonary***  Acute respiratory failure  Wean ventilator as tolerated.  Trached today this afternoon            ***GI***  Protein calorie malnutrition - Tube feeds via nasal feeding tube  Pancreatitis   Protonix for stress ulcer prophylaxis   Bowel regimen with Miralax and Senna  Reglan for gut motility       ***Renal***  ERIC, renal following, continue CRRT  Volume removal as tolerated.  Replete electrolytes as indicated.        ***ID***  Recent fevers.  Vanco, Merrem, Diflucan - Following procedure can likely taper antibiotics to off if cultures remain negative.      ***Endocrine***  Hx of DM2, continue glycemic control w/ ISS.            Patient requires continuous monitoring with bedside rhythm monitoring, pulse oximetry monitoring, and continuous central venous and arterial pressure monitoring; and intermittent blood gas analysis. Care plan discussed with the ICU care team.   Patient remained critical, at risk for life threatening decompensation.    I have spent 30 minutes providing critical care management to this patient.    By signing my name below, I, Abhijit Ngo, attest that this documentation has been prepared under the direction and in the presence of Jade Rosas MD  Electronically signed: Brian Slaughter, 12-16-22 @ 07:59    I, Jade Rosas MD, personally performed the services described in this documentation. all medical record entries made by the karlaibe were at my direction and in my presence. I have reviewed the chart and agree that the record reflects my personal performance and is accurate and complete  Electronically signed: Jade Rosas MD Patient seen and examined at the bedside.    Remained critically ill on continuous ICU monitoring.      Brief Summary:  61 yo M with Cardiogenic shock s/p VA ECMO, decannulated 12/8/22. Currently supported with an IABP.        24 Hour events:  - Mitral clip inserted in OR today  - Wide complex tachycardia post-procedure - Magnesium and Amiodarone given.  - Tracheostomy planned for this afternoon.      OBJECTIVE:  Vital Signs Last 24 Hrs  T(C): 37.4 (16 Dec 2022 05:27), Max: 37.4 (16 Dec 2022 05:27)  T(F): 99.3 (16 Dec 2022 05:27), Max: 99.3 (16 Dec 2022 05:27)  HR: 105 (16 Dec 2022 07:57) (92 - 107)  BP: 116/32 (16 Dec 2022 05:27) (108/54 - 116/32)  BP(mean): 76 (15 Dec 2022 20:52) (76 - 76)  RR: 11 (16 Dec 2022 06:00) (10 - 28)  SpO2: 100% (16 Dec 2022 07:57) (98% - 100%)    Parameters below as of 16 Dec 2022 05:34  Patient On (Oxygen Delivery Method): ventilator        Mode: off              -------------------------------------------------------------------------------------------------------------------------------  Physical Exam:   General: Intubated, multiple lines gtt  Neurology: Following commands  Eyes: bilateral pupils equal and reactive   ENT/Neck: +trach, Neck supple, trachea midline, No JVD   Respiratory: rhonchi bilaterally   CV: S1S2, no murmurs        [x] Sinus Tachycardia  Abdominal: Softly distended,+BS   Extremities: 1+ pedal edema noted, + peripheral pulses palpable, warm  Skin: No Rashes, Hematoma, Ecchymosis                                        8.8    12.42 )-----------( 104      ( 16 Dec 2022 00:24 )             28.4     12-16    137  |  103  |  19  ----------------------------<  152<H>  4.5   |  22  |  1.41<H>    Ca    8.0<L>      16 Dec 2022 00:23  Phos  4.3     12-16  Mg     2.6     12-16    TPro  6.6  /  Alb  3.1<L>  /  TBili  1.0  /  DBili  x   /  AST  39  /  ALT  17  /  AlkPhos  112  12-16    LIVER FUNCTIONS - ( 16 Dec 2022 00:23 )  Alb: 3.1 g/dL / Pro: 6.6 g/dL / ALK PHOS: 112 U/L / ALT: 17 U/L / AST: 39 U/L / GGT: x           PT/INR - ( 16 Dec 2022 00:23 )   PT: 12.4 sec;   INR: 1.07 ratio         PTT - ( 16 Dec 2022 04:28 )  PTT:52.9 sec  ABG - ( 16 Dec 2022 00:01 )  pH, Arterial: 7.42  pH, Blood: x     /  pCO2: 34    /  pO2: 114   / HCO3: 22    / Base Excess: -2.0  /  SaO2: 98.4            ------------------------------------------------------------------------------------------------------------------------------  Assessment:  61 y/o male with Pancreatitis and Cardiogenic shock    Acute respiratory distress/failure, intubated ,   Cardiogenic shock s/p VA ECMO decannulated 12/8  CAD/ASHD/CABG, acute MI  At high risk for hemodynamic instability  At high risk for cardiac arrhythmias  Acute kidney injury  Encounter management IABP  Encounter weaning ventilator  Leukocytosis   Anemia  Thrombocytopenia   Acute pancreatitis        Plan:   ***Neuro***  CT head showed 4mm subdural hematoma 11/26: repeat CT head unchanged 12/01  Soft palate laceration, ENT scoped 12/8, stable, no acute intervention at this time  Repeat Head CT 12/13 Similar minimal increased density along the right aspect of the posterior   falx and right tentorial leaf, consistent with minimal residual subdural   hemorrhage.  No parenchymal hemorrhage or brain edema.  Patient sedated w/ low dose Precedex   Acute pain control with Tylenol    ***Cardiovascular***  Invasive hemodynamic monitoring, assess perfusion indices   At high risk for hemodynamic instability and cardiac arrhythmias.  Lactate 1.1, continue trending   Continue low dose Milrinone for now.  Mitral regurgitation - Mitral clip inserted in the OR today   L Fem IABP 1:1 with good augmentation - weaning trial likely tomorrow.  AC Therapy with Heparin gtt pAF/flutter   Amiodarone  Statin daily.      ***Pulmonary***  Acute respiratory failure  Continue pressure support trials.  Trach today       ***GI***  Protein calorie malnutrition - Tube feeds via nasal feeding tube  Pancreatitis   Protonix for stress ulcer prophylaxis   Bowel regimen with Miralax and Senna  Reglan as needed for gut motility       ***Renal***  ERIC, renal following, continue CRRT  Volume removal as tolerated, trend filling pressure.  Replete electrolytes as indicated.      ***ID***  Recent fevers.  Vanco, Merrem, Diflucan - Following procedure can likely taper antibiotics to off if cultures remain negative.      ***Endocrine***  Hx of DM2, continue glycemic control w/ ISS.          Patient requires continuous monitoring with bedside rhythm monitoring, pulse oximetry monitoring, and continuous central venous and arterial pressure monitoring; and intermittent blood gas analysis. Care plan discussed with the ICU care team.   Patient remained critical, at risk for life threatening decompensation.    I have spent 45 minutes providing critical care management to this patient.    By signing my name below, I, Abhijit Ngo, attest that this documentation has been prepared under the direction and in the presence of Jade Rosas MD  Electronically signed: Brian Slaughter, 12-16-22 @ 07:59    JOHNATHAN, Jade Rosas MD, personally performed the services described in this documentation. all medical record entries made by the karlaibe were at my direction and in my presence. I have reviewed the chart and agree that the record reflects my personal performance and is accurate and complete  Electronically signed: Jade Rosas MD Patient seen and examined at the bedside.    Remained critically ill on continuous ICU monitoring.      Brief Summary:  61 yo M with Cardiogenic shock s/p VA ECMO, decannulated 12/8/22. Currently supported with an IABP.        24 Hour events:  - Mitral clip inserted in OR today  - Wide complex tachycardia post-procedure - Magnesium and Amiodarone given.  - Tracheostomy planned for this afternoon.      OBJECTIVE:  Vital Signs Last 24 Hrs  T(C): 37.4 (16 Dec 2022 05:27), Max: 37.4 (16 Dec 2022 05:27)  T(F): 99.3 (16 Dec 2022 05:27), Max: 99.3 (16 Dec 2022 05:27)  HR: 105 (16 Dec 2022 07:57) (92 - 107)  BP: 116/32 (16 Dec 2022 05:27) (108/54 - 116/32)  BP(mean): 76 (15 Dec 2022 20:52) (76 - 76)  RR: 11 (16 Dec 2022 06:00) (10 - 28)  SpO2: 100% (16 Dec 2022 07:57) (98% - 100%)    Parameters below as of 16 Dec 2022 05:34  Patient On (Oxygen Delivery Method): ventilator        Mode: off              -------------------------------------------------------------------------------------------------------------------------------  Physical Exam:   General: Intubated, multiple lines gtt  Neurology: Following commands  Eyes: bilateral pupils equal and reactive   ENT/Neck: +trach, Neck supple, trachea midline, No JVD   Respiratory: rhonchi bilaterally   CV: S1S2, no murmurs        [x] Sinus Tachycardia  Abdominal: Softly distended,+BS   Extremities: 1+ pedal edema noted, + peripheral pulses palpable, warm  Skin: No Rashes, Hematoma, Ecchymosis                                        8.8    12.42 )-----------( 104      ( 16 Dec 2022 00:24 )             28.4     12-16    137  |  103  |  19  ----------------------------<  152<H>  4.5   |  22  |  1.41<H>    Ca    8.0<L>      16 Dec 2022 00:23  Phos  4.3     12-16  Mg     2.6     12-16    TPro  6.6  /  Alb  3.1<L>  /  TBili  1.0  /  DBili  x   /  AST  39  /  ALT  17  /  AlkPhos  112  12-16    LIVER FUNCTIONS - ( 16 Dec 2022 00:23 )  Alb: 3.1 g/dL / Pro: 6.6 g/dL / ALK PHOS: 112 U/L / ALT: 17 U/L / AST: 39 U/L / GGT: x           PT/INR - ( 16 Dec 2022 00:23 )   PT: 12.4 sec;   INR: 1.07 ratio         PTT - ( 16 Dec 2022 04:28 )  PTT:52.9 sec  ABG - ( 16 Dec 2022 00:01 )  pH, Arterial: 7.42  pH, Blood: x     /  pCO2: 34    /  pO2: 114   / HCO3: 22    / Base Excess: -2.0  /  SaO2: 98.4            ------------------------------------------------------------------------------------------------------------------------------  Assessment:  63 y/o male with Pancreatitis and Cardiogenic shock    Acute respiratory distress/failure, intubated ,   Cardiogenic shock s/p VA ECMO decannulated 12/8  CAD/ASHD/CABG, acute MI  At high risk for hemodynamic instability  At high risk for cardiac arrhythmias  Acute kidney injury  Encounter management IABP  Encounter weaning ventilator  Leukocytosis   Anemia  Thrombocytopenia   Acute pancreatitis        Plan:   ***Neuro***  CT head showed 4mm subdural hematoma 11/26: repeat CT head unchanged 12/01  Soft palate laceration, ENT scoped 12/8, stable, no acute intervention at this time  Repeat Head CT 12/13 Similar minimal increased density along the right aspect of the posterior   falx and right tentorial leaf, consistent with minimal residual subdural   hemorrhage.  No parenchymal hemorrhage or brain edema.  Patient sedated w/ low dose Precedex   Acute pain control with Tylenol    ***Cardiovascular***  Invasive hemodynamic monitoring, assess perfusion indices   At high risk for hemodynamic instability and cardiac arrhythmias.  Lactate 1.1, continue trending   Continue low dose Milrinone for now.  Mitral regurgitation - Mitral clip inserted in the OR today   L Fem IABP 1:1 with good augmentation - weaning trial likely tomorrow.  AC Therapy with Heparin gtt pAF/flutter   Amiodarone  Statin daily.      ***Pulmonary***  Acute respiratory failure  Continue pressure support trials.  Trach today       ***GI***  Protein calorie malnutrition - Tube feeds via nasal feeding tube  Pancreatitis   Protonix for stress ulcer prophylaxis   Bowel regimen with Miralax and Senna  Reglan as needed for gut motility       ***Renal***  ERIC, renal following, continue CRRT  Volume removal as tolerated, trend filling pressure.  Replete electrolytes as indicated.      ***ID***  Recent fevers.  Vanco, Merrem, Diflucan - Following procedure can likely taper antibiotics to off if cultures remain negative.      ***Endocrine***  Hx of DM2, continue glycemic control w/ ISS.          Patient requires continuous monitoring with bedside rhythm monitoring, pulse oximetry monitoring, and continuous central venous and arterial pressure monitoring; and intermittent blood gas analysis. Care plan discussed with the ICU care team.   Patient remained critical, at risk for life threatening decompensation.    I have spent 45 minutes providing critical care management to this patient.    By signing my name below, I, Abhijit Ngo, attest that this documentation has been prepared under the direction and in the presence of Jade Rosas MD  Electronically signed: Brian Slaughter, 12-16-22 @ 07:59    JOHNATHAN, Jade Rosas MD, personally performed the services described in this documentation. all medical record entries made by the karlaibe were at my direction and in my presence. I have reviewed the chart and agree that the record reflects my personal performance and is accurate and complete  Electronically signed: Jade Rosas MD

## 2022-12-16 NOTE — PROGRESS NOTE ADULT - SUBJECTIVE AND OBJECTIVE BOX
infectious diseases progress note:    Patient is a 62y old  Male who presents with a chief complaint of Acute cholecystitis, gallstone pancreatitis (16 Dec 2022 07:59)        Acute pancreatitis without infection or necrosis               Allergies    No Known Allergies    Intolerances        ANTIBIOTICS/RELEVANT:  antimicrobials    immunologic:    OTHER:  aMIOdarone IVPB 150 milliGRAM(s) IV Intermittent once  aMIOdarone IVPB 150 milliGRAM(s) IV Intermittent once      Objective:  Vital Signs Last 24 Hrs  T(C): 37.4 (16 Dec 2022 05:27), Max: 37.4 (16 Dec 2022 05:27)  T(F): 99.3 (16 Dec 2022 05:27), Max: 99.3 (16 Dec 2022 05:27)  HR: 105 (16 Dec 2022 07:57) (92 - 107)  BP: 116/32 (16 Dec 2022 05:27) (108/54 - 116/32)  BP(mean): 76 (15 Dec 2022 20:52) (76 - 76)  RR: 11 (16 Dec 2022 06:00) (10 - 28)  SpO2: 100% (16 Dec 2022 07:57) (98% - 100%)    Parameters below as of 16 Dec 2022 05:34  Patient On (Oxygen Delivery Method): ventilator         Eyes:INOCENCIO, EOMI  Ear/Nose/Throat: no oral lesion, no sinus tenderness on percussion	  Neck:no JVD, no lymphadenopathy, supple  Respiratory: CTA lore  Cardiovascular: S1S2 RRR, no murmurs  Gastrointestinal:soft, (+) BS, no HSM  Extremities:no e/e/c        LABS:                        8.8    12.42 )-----------( 104      ( 16 Dec 2022 00:24 )             28.4     12-16    137  |  103  |  19  ----------------------------<  152<H>  4.5   |  22  |  1.41<H>    Ca    8.0<L>      16 Dec 2022 00:23  Phos  4.3     12-16  Mg     2.6     12-16    TPro  6.6  /  Alb  3.1<L>  /  TBili  1.0  /  DBili  x   /  AST  39  /  ALT  17  /  AlkPhos  112  12-16    PT/INR - ( 16 Dec 2022 00:23 )   PT: 12.4 sec;   INR: 1.07 ratio         PTT - ( 16 Dec 2022 04:28 )  PTT:52.9 sec  Urinalysis Basic - ( 16 Dec 2022 04:28 )    Color: DK BROWN / Appearance: Turbid / S.029 / pH: x  Gluc: x / Ketone: Negative  / Bili: Negative / Urobili: 6 mg/dL   Blood: x / Protein: 300 mg/dL / Nitrite: Negative   Leuk Esterase: Large / RBC: 369 /hpf /  /HPF   Sq Epi: x / Non Sq Epi: 8 /hpf / Bacteria: Moderate          MICROBIOLOGY:    RECENT CULTURES:  12-15 @ 17:46 .Sputum Sputum       Few polymorphonuclear leukocytes per low power field  Rare Squamous epithelial cells per low power field  Rare Yeast per oil power field              @ 00:26 Catheterized Catheterized                No growth    12-10 @ 15:45 .Blood Blood-Peripheral                No Growth Final    12-10 @ 15:30 .Blood Blood-Peripheral                No Growth Final    12-10 @ 10:49 ET Tube ET Tube       Moderate polymorphonuclear leukocytes per low power field  No Squamous epithelial cells per low power field  Few Yeast like cells per oil power field           Normal Respiratory Christy present          RESPIRATORY CULTURES:              RADIOLOGY & ADDITIONAL STUDIES:        Pager [phone]  After 5 pm/weekends or if no response :[phone] infectious diseases progress note:    Patient is a 62y old  Male who presents with a chief complaint of Acute cholecystitis, gallstone pancreatitis (16 Dec 2022 07:59)        Acute pancreatitis without infection or necrosis               Allergies    No Known Allergies    Intolerances        ANTIBIOTICS/RELEVANT:  antimicrobials    immunologic:    OTHER:  aMIOdarone IVPB 150 milliGRAM(s) IV Intermittent once  aMIOdarone IVPB 150 milliGRAM(s) IV Intermittent once      Objective:  Vital Signs Last 24 Hrs  T(C): 37.4 (16 Dec 2022 05:27), Max: 37.4 (16 Dec 2022 05:27)  T(F): 99.3 (16 Dec 2022 05:27), Max: 99.3 (16 Dec 2022 05:27)  HR: 105 (16 Dec 2022 07:57) (92 - 107)  BP: 116/32 (16 Dec 2022 05:27) (108/54 - 116/32)  BP(mean): 76 (15 Dec 2022 20:52) (76 - 76)  RR: 11 (16 Dec 2022 06:00) (10 - 28)  SpO2: 100% (16 Dec 2022 07:57) (98% - 100%)    Parameters below as of 16 Dec 2022 05:34  Patient On (Oxygen Delivery Method): ventilator         Eyes:INOCENCIO, EOMI  Ear/Nose/Throat: no oral lesion, no sinus tenderness on percussion	  Neck:no JVD, no lymphadenopathy, supple  Respiratory: CTA lore  Cardiovascular: S1S2 RRR, no murmurs  Gastrointestinal:soft, (+) BS, no HSM  Extremities:no e/e/c        LABS:                        8.8    12.42 )-----------( 104      ( 16 Dec 2022 00:24 )             28.4     12-16    137  |  103  |  19  ----------------------------<  152<H>  4.5   |  22  |  1.41<H>    Ca    8.0<L>      16 Dec 2022 00:23  Phos  4.3     12-16  Mg     2.6     12-16    TPro  6.6  /  Alb  3.1<L>  /  TBili  1.0  /  DBili  x   /  AST  39  /  ALT  17  /  AlkPhos  112  12-16    PT/INR - ( 16 Dec 2022 00:23 )   PT: 12.4 sec;   INR: 1.07 ratio         PTT - ( 16 Dec 2022 04:28 )  PTT:52.9 sec  Urinalysis Basic - ( 16 Dec 2022 04:28 )    Color: DK BROWN / Appearance: Turbid / S.029 / pH: x  Gluc: x / Ketone: Negative  / Bili: Negative / Urobili: 6 mg/dL   Blood: x / Protein: 300 mg/dL / Nitrite: Negative   Leuk Esterase: Large / RBC: 369 /hpf /  /HPF   Sq Epi: x / Non Sq Epi: 8 /hpf / Bacteria: Moderate          MICROBIOLOGY:    RECENT CULTURES:  12-15 @ 17:46 .Sputum Sputum       Few polymorphonuclear leukocytes per low power field  Rare Squamous epithelial cells per low power field  Rare Yeast per oil power field              @ 00:26 Catheterized Catheterized                No growth    12-10 @ 15:45 .Blood Blood-Peripheral                No Growth Final    12-10 @ 15:30 .Blood Blood-Peripheral                No Growth Final    12-10 @ 10:49 ET Tube ET Tube       Moderate polymorphonuclear leukocytes per low power field  No Squamous epithelial cells per low power field  Few Yeast like cells per oil power field           Normal Respiratory Christy present          RESPIRATORY CULTURES:              RADIOLOGY & ADDITIONAL STUDIES:        Pager [phone]  After 5 pm/weekends or if no response :[phone] infectious diseases progress note:    Patient is a 62y old  Male who presents with a chief complaint of Acute cholecystitis, gallstone pancreatitis (16 Dec 2022 07:59)        Acute pancreatitis without infection or necrosis               Allergies    No Known Allergies    Intolerances        ANTIBIOTICS/RELEVANT:  antimicrobials    immunologic:    OTHER:  aMIOdarone IVPB 150 milliGRAM(s) IV Intermittent once  aMIOdarone IVPB 150 milliGRAM(s) IV Intermittent once      Objective:  Vital Signs Last 24 Hrs  T(C): 37.4 (16 Dec 2022 05:27), Max: 37.4 (16 Dec 2022 05:27)  T(F): 99.3 (16 Dec 2022 05:27), Max: 99.3 (16 Dec 2022 05:27)  HR: 105 (16 Dec 2022 07:57) (92 - 107)  BP: 116/32 (16 Dec 2022 05:27) (108/54 - 116/32)  BP(mean): 76 (15 Dec 2022 20:52) (76 - 76)  RR: 11 (16 Dec 2022 06:00) (10 - 28)  SpO2: 100% (16 Dec 2022 07:57) (98% - 100%)    Parameters below as of 16 Dec 2022 05:34  Patient On (Oxygen Delivery Method): ventilator         Eyes:INOCENCIO, EOMI  Ear/Nose/Throat: no oral lesion, no sinus tenderness on percussion	  Neck:no JVD, no lymphadenopathy, supple  Respiratory: CTA lore  Cardiovascular: S1S2 RRR, no murmurs  Gastrointestinal:soft, (+) BS, no HSM  Extremities:no e/e/c        LABS:                        8.8    12.42 )-----------( 104      ( 16 Dec 2022 00:24 )             28.4     12-16    137  |  103  |  19  ----------------------------<  152<H>  4.5   |  22  |  1.41<H>    Ca    8.0<L>      16 Dec 2022 00:23  Phos  4.3     12-16  Mg     2.6     12-16    TPro  6.6  /  Alb  3.1<L>  /  TBili  1.0  /  DBili  x   /  AST  39  /  ALT  17  /  AlkPhos  112  12-16    PT/INR - ( 16 Dec 2022 00:23 )   PT: 12.4 sec;   INR: 1.07 ratio         PTT - ( 16 Dec 2022 04:28 )  PTT:52.9 sec  Urinalysis Basic - ( 16 Dec 2022 04:28 )    Color: DK BROWN / Appearance: Turbid / S.029 / pH: x  Gluc: x / Ketone: Negative  / Bili: Negative / Urobili: 6 mg/dL   Blood: x / Protein: 300 mg/dL / Nitrite: Negative   Leuk Esterase: Large / RBC: 369 /hpf /  /HPF   Sq Epi: x / Non Sq Epi: 8 /hpf / Bacteria: Moderate          MICROBIOLOGY:    RECENT CULTURES:  12-15 @ 17:46 .Sputum Sputum       Few polymorphonuclear leukocytes per low power field  Rare Squamous epithelial cells per low power field  Rare Yeast per oil power field              @ 00:26 Catheterized Catheterized                No growth    12-10 @ 15:45 .Blood Blood-Peripheral                No Growth Final    12-10 @ 15:30 .Blood Blood-Peripheral                No Growth Final    12-10 @ 10:49 ET Tube ET Tube       Moderate polymorphonuclear leukocytes per low power field  No Squamous epithelial cells per low power field  Few Yeast like cells per oil power field           Normal Respiratory Christy present          RESPIRATORY CULTURES:              RADIOLOGY & ADDITIONAL STUDIES:        Pager [phone]  After 5 pm/weekends or if no response :[phone]

## 2022-12-16 NOTE — BRIEF OPERATIVE NOTE - COMMENTS
S/P # 7 Proximal XLT Trach placed, Surgicel placed around stoma.
Open R femoral arterial decannulation, wound closure and vac placement  L femoral venous closed decannulation

## 2022-12-16 NOTE — PROGRESS NOTE ADULT - SUBJECTIVE AND OBJECTIVE BOX
Patient seen and examined at the bedside.    Remained critically ill on continuous ICU monitoring.    OBJECTIVE:   s/p Mitral clip  s/p trach          albuterol/ipratropium for Nebulization 3 milliLiter(s) Nebulizer every 6 hours  aMIOdarone    Tablet 200 milliGRAM(s) Oral daily  atorvastatin 40 milliGRAM(s) Oral at bedtime  chlorhexidine 0.12% Liquid 15 milliLiter(s) Oral Mucosa every 12 hours  CRRT Treatment    <Continuous>  dexMEDEtomidine Infusion 0.4 MICROgram(s)/kG/Hr IV Continuous <Continuous>  heparin  Infusion Syringe 300 Unit(s)/Hr CRRT <Continuous>  insulin regular Infusion 3 Unit(s)/Hr IV Continuous <Continuous>  milrinone Infusion 0.1 MICROgram(s)/kG/Min IV Continuous <Continuous>  multivitamin/minerals/iron Oral Solution (CENTRUM) 15 milliLiter(s) Oral daily  norepinephrine Infusion 0.05 MICROgram(s)/kG/Min IV Continuous <Continuous>  pantoprazole  Injectable 40 milliGRAM(s) IV Push daily  PrismaSATE Dialysate BK 0 / 3.5 5000 milliLiter(s) CRRT <Continuous>  PrismaSOL Filtration BGK 0 / 2.5 5000 milliLiter(s) CRRT <Continuous>  PrismaSOL Filtration BGK 0 / 2.5 5000 milliLiter(s) CRRT <Continuous>  propofol Infusion 20 MICROgram(s)/kG/Min IV Continuous <Continuous>  thiamine 100 milliGRAM(s) Enteral Tube daily  vasopressin Infusion 0.02 Unit(s)/Min IV Continuous <Continuous>                            10.1   15.70 )-----------( 114      ( 16 Dec 2022 10:58 )             32.9       Hemoglobin: 10.1 g/dL (12-16 @ 10:58)  Hemoglobin: 8.8 g/dL (12-16 @ 00:24)  Hemoglobin: 8.8 g/dL (12-15 @ 00:45)  Hemoglobin: 9.2 g/dL (12-14 @ 00:34)  Hemoglobin: 9.0 g/dL (12-13 @ 01:08)      12-16    135  |  101  |  22  ----------------------------<  285<H>  5.7<H>   |  18<L>  |  1.88<H>    Ca    8.3<L>      16 Dec 2022 10:55  Phos  6.2     12-16  Mg     3.0     12-16    TPro  7.0  /  Alb  3.4  /  TBili  0.9  /  DBili  x   /  AST  44<H>  /  ALT  18  /  AlkPhos  116  12-16    Creatinine Trend: 1.88<--, 1.41<--, 1.63<--, 1.43<--, 1.38<--, 1.55<--    COAGS: PT/INR - ( 16 Dec 2022 10:55 )   PT: 12.7 sec;   INR: 1.10 ratio         PTT - ( 16 Dec 2022 13:08 )  PTT:27.7 sec    CARDIAC MARKERS ( 16 Dec 2022 10:55 )  x     / x     / 27 U/L / x     / 1.6 ng/mL        T(C): 34.4 (12-16-22 @ 20:00), Max: 37.7 (12-16-22 @ 10:30)  HR: 88 (12-16-22 @ 21:44) (80 - 128)  BP: 116/32 (12-16-22 @ 05:27) (116/32 - 116/32)  RR: 12 (12-16-22 @ 21:30) (10 - 38)  SpO2: 100% (12-16-22 @ 21:44) (98% - 100%)  Wt(kg): --    I&O's Summary    15 Dec 2022 07:01  -  16 Dec 2022 07:00  --------------------------------------------------------  IN: 2201.7 mL / OUT: 5896 mL / NET: -3694.3 mL    16 Dec 2022 07:01  -  16 Dec 2022 22:43  --------------------------------------------------------  IN: 888.9 mL / OUT: 310 mL / NET: 578.9 mL          Physical Exam:   General: Intubated, multiple lines gtt  Neurology: Sedated  Eyes: bilateral pupils equal and reactive   ENT/Neck: +ETT midline, Neck supple, trachea midline, No JVD   Respiratory: rhonchi bilaterally   CV: S1S2, no murmurs        [x] Sinus Rhythm   Abdominal: Softly distended,+BS   Extremities: 1+ pedal edema noted, + peripheral pulses palpable, warm  Skin: No Rashes, Hematoma, Ecchymosis              ======================Micro/Rad/Cardio=================  Culture: Reviewed   CXR: Reviewed  Echo: Reviewed  ======================================================  PAST MEDICAL & SURGICAL HISTORY:      ======================================================  Assessment:  63 y/o male with PMH of CABG, DM, HTN, HLD presenting as transfer from [place] for c/f STEMI. PTA arrival received 325 aspirin, 600 plavix, and no heparin drip started. Around 1:30 am patient had multiple episodes of non-bloody diarrhea and emesis with associated abdominal pain and chest pain, unable to localize, non-radiating, with associated SOB and no associated palpitations or diaphoresis. No recent fevers/chills, cough, rashes, or changes in urination. Does report mild diffuse back pain; started 5 days ago in setting on injury while walking and lifting objects. Denies focal weakness, numbness/tingling, or LOC due does report mild dizziness.    acute respiratory distress/failure, intubated ,   cardiogenic shock s/p VA ECMO decannulated 12/8  CAD/ASHD/CABG, acute MI  cardiogenic shock  Hemodynamic instability  acute renal failure  encounter management IABP  encounter weaning ventilator  Leukocytosis   Thrombocytopenia  Anemia  acute pancreatitis  ======================================================  Plan:   ***Neuro***  CT head showed 4mm subdural hematoma 11/26: repeat CT head unchanged 12/01,  [x] Sedated with [x] Precedex for vent synchrony  Post operative neuro assessment   Soft palate laceration, ENT scoped 12/8, stable, no acute intervention at this time  Repeat Head CT 12/13 Similar minimal increased density along the right aspect of the posterior   falx and right tentorial leaf, consistent with minimal residual subdural   hemorrhage.    ***Cardiovascular***  12/8 TTE EF 20-25%, Severe global left ventricular systolic dysfunction.  12/8 GRZEGORZ EF 40-45%, MR, TR, normal RV function  12/7 TTE turndown EF 30-40%, normal RV, MR mod   Invasive hemodynamic monitoring, assess perfusion indices   SR / CVP 10/ MAP 74/ Hct 28.7/ Lactate 0.9  [x] Primacor- 0.1 mcgs/kg/min [x] Vasopressin 0.02 Units  [x] 12/7 cath patent LIMA graft and other grafts occluded, femoral IABP placed, with good augmentation @ 1:1,  [x] 12/8 ECMO d/c'd  PO Amiodarone for afib prophylaxis   Continuos reassessment of hemodynamics   12/8 Aflutter s/p DCCV x2  [x] AC Therapy with Heparin gtt PTT 44  [x] Statin   11/29 HIT NG, 12/8 MARY Neg  Remains on primacor    ***Pulmonary***  Post op vent management   Titration of FiO2 and PEEP, follow SpO2, CXR, blood gasses   CPAP trials as tolerated      Mode: AC/ CMV (Assist Control/ Continuous Mandatory Ventilation)  RR (machine): 14  FiO2: 40  PEEP: 5  ITime: 1  MAP: 10  PC: 12  PIP: 19              ***GI***  [x] NPO with TF's Glucerna 1.5 @ goal of 65cc/hr   [x] Protonix    Bowel regimen with Miralax and senna  Reglan for gut motility    ***Renal***  ERIC, renal following CVVHD started 12/5  Continue to monitor I/Os, BUN/Creatinine.   Replete lytes PRN  De Dios present  CRRT Treatment    ***ID***  Empiric coverage with Vancomycin and Meropenem   Diflucan for prophylaxis   Plan to CT to rule out infection     ***Endocrine***  [x]  DM2: HbA1c 7.3%                - [x] ISS             - Need tight glycemic control to prevent wound infection.        Patient requires continuous monitoring with:  bedside rhythm monitoring, arterial line, pulse oximetry, ventilator management and monitoring; intermittent blood gas analysis.  Care plan discussed with ICU care team.  patient remain critical; required more than usual post op care; I have spent 50 minutes providing non routine post op care, revaluated multiple times during the day .       Care Plan discussed with the ICU care team. Patient remained critical, at risk for life threatening decompensation.     Patient seen and examined at the bedside.    Remained critically ill on continuous ICU monitoring.    OBJECTIVE:   s/p Mitral clip  s/p trach          albuterol/ipratropium for Nebulization 3 milliLiter(s) Nebulizer every 6 hours  aMIOdarone    Tablet 200 milliGRAM(s) Oral daily  atorvastatin 40 milliGRAM(s) Oral at bedtime  chlorhexidine 0.12% Liquid 15 milliLiter(s) Oral Mucosa every 12 hours  CRRT Treatment    <Continuous>  dexMEDEtomidine Infusion 0.4 MICROgram(s)/kG/Hr IV Continuous <Continuous>  heparin  Infusion Syringe 300 Unit(s)/Hr CRRT <Continuous>  insulin regular Infusion 3 Unit(s)/Hr IV Continuous <Continuous>  milrinone Infusion 0.1 MICROgram(s)/kG/Min IV Continuous <Continuous>  multivitamin/minerals/iron Oral Solution (CENTRUM) 15 milliLiter(s) Oral daily  norepinephrine Infusion 0.05 MICROgram(s)/kG/Min IV Continuous <Continuous>  pantoprazole  Injectable 40 milliGRAM(s) IV Push daily  PrismaSATE Dialysate BK 0 / 3.5 5000 milliLiter(s) CRRT <Continuous>  PrismaSOL Filtration BGK 0 / 2.5 5000 milliLiter(s) CRRT <Continuous>  PrismaSOL Filtration BGK 0 / 2.5 5000 milliLiter(s) CRRT <Continuous>  propofol Infusion 20 MICROgram(s)/kG/Min IV Continuous <Continuous>  thiamine 100 milliGRAM(s) Enteral Tube daily  vasopressin Infusion 0.02 Unit(s)/Min IV Continuous <Continuous>                            10.1   15.70 )-----------( 114      ( 16 Dec 2022 10:58 )             32.9       Hemoglobin: 10.1 g/dL (12-16 @ 10:58)  Hemoglobin: 8.8 g/dL (12-16 @ 00:24)  Hemoglobin: 8.8 g/dL (12-15 @ 00:45)  Hemoglobin: 9.2 g/dL (12-14 @ 00:34)  Hemoglobin: 9.0 g/dL (12-13 @ 01:08)      12-16    135  |  101  |  22  ----------------------------<  285<H>  5.7<H>   |  18<L>  |  1.88<H>    Ca    8.3<L>      16 Dec 2022 10:55  Phos  6.2     12-16  Mg     3.0     12-16    TPro  7.0  /  Alb  3.4  /  TBili  0.9  /  DBili  x   /  AST  44<H>  /  ALT  18  /  AlkPhos  116  12-16    Creatinine Trend: 1.88<--, 1.41<--, 1.63<--, 1.43<--, 1.38<--, 1.55<--    COAGS: PT/INR - ( 16 Dec 2022 10:55 )   PT: 12.7 sec;   INR: 1.10 ratio         PTT - ( 16 Dec 2022 13:08 )  PTT:27.7 sec    CARDIAC MARKERS ( 16 Dec 2022 10:55 )  x     / x     / 27 U/L / x     / 1.6 ng/mL        T(C): 34.4 (12-16-22 @ 20:00), Max: 37.7 (12-16-22 @ 10:30)  HR: 88 (12-16-22 @ 21:44) (80 - 128)  BP: 116/32 (12-16-22 @ 05:27) (116/32 - 116/32)  RR: 12 (12-16-22 @ 21:30) (10 - 38)  SpO2: 100% (12-16-22 @ 21:44) (98% - 100%)  Wt(kg): --    I&O's Summary    15 Dec 2022 07:01  -  16 Dec 2022 07:00  --------------------------------------------------------  IN: 2201.7 mL / OUT: 5896 mL / NET: -3694.3 mL    16 Dec 2022 07:01  -  16 Dec 2022 22:43  --------------------------------------------------------  IN: 888.9 mL / OUT: 310 mL / NET: 578.9 mL          Physical Exam:   General: Intubated, multiple lines gtt  Neurology: Sedated  Eyes: bilateral pupils equal and reactive   ENT/Neck: +ETT midline, Neck supple, trachea midline, No JVD   Respiratory: rhonchi bilaterally   CV: S1S2, no murmurs        [x] Sinus Rhythm   Abdominal: Softly distended,+BS   Extremities: 1+ pedal edema noted, + peripheral pulses palpable, warm  Skin: No Rashes, Hematoma, Ecchymosis              ======================Micro/Rad/Cardio=================  Culture: Reviewed   CXR: Reviewed  Echo: Reviewed  ======================================================  PAST MEDICAL & SURGICAL HISTORY:      ======================================================  Assessment:  63 y/o male with PMH of CABG, DM, HTN, HLD presenting as transfer from [place] for c/f STEMI. PTA arrival received 325 aspirin, 600 plavix, and no heparin drip started. Around 1:30 am patient had multiple episodes of non-bloody diarrhea and emesis with associated abdominal pain and chest pain, unable to localize, non-radiating, with associated SOB and no associated palpitations or diaphoresis. No recent fevers/chills, cough, rashes, or changes in urination. Does report mild diffuse back pain; started 5 days ago in setting on injury while walking and lifting objects. Denies focal weakness, numbness/tingling, or LOC due does report mild dizziness.    acute respiratory distress/failure, intubated ,   cardiogenic shock s/p VA ECMO decannulated 12/8  CAD/ASHD/CABG, acute MI  cardiogenic shock  Hemodynamic instability  acute renal failure  encounter management IABP  encounter weaning ventilator  Leukocytosis   Thrombocytopenia  Anemia  acute pancreatitis  ======================================================  Plan:   ***Neuro***  CT head showed 4mm subdural hematoma 11/26: repeat CT head unchanged 12/01,  [x] Sedated with [x] Precedex for vent synchrony  Post operative neuro assessment   Soft palate laceration, ENT scoped 12/8, stable, no acute intervention at this time  Repeat Head CT 12/13 Similar minimal increased density along the right aspect of the posterior   falx and right tentorial leaf, consistent with minimal residual subdural   hemorrhage.    ***Cardiovascular***  12/8 TTE EF 20-25%, Severe global left ventricular systolic dysfunction.  12/8 GRZEGORZ EF 40-45%, MR, TR, normal RV function  12/7 TTE turndown EF 30-40%, normal RV, MR mod   Invasive hemodynamic monitoring, assess perfusion indices   SR / CVP 10/ MAP 74/ Hct 28.7/ Lactate 0.9  [x] Primacor- 0.1 mcgs/kg/min [x] Vasopressin 0.02 Units  [x] 12/7 cath patent LIMA graft and other grafts occluded, femoral IABP placed, with good augmentation @ 1:1,  [x] 12/8 ECMO d/c'd  PO Amiodarone for afib prophylaxis   Continuos reassessment of hemodynamics   12/8 Aflutter s/p DCCV x2  [x] AC Therapy with Heparin gtt PTT 44  [x] Statin   11/29 HIT NG, 12/8 MARY Neg  Remains on primacor    ***Pulmonary***  Post op vent management   Titration of FiO2 and PEEP, follow SpO2, CXR, blood gasses   CPAP trials as tolerated      Mode: AC/ CMV (Assist Control/ Continuous Mandatory Ventilation)  RR (machine): 14  FiO2: 40  PEEP: 5  ITime: 1  MAP: 10  PC: 12  PIP: 19              ***GI***  [x] NPO with TF's Glucerna 1.5 @ goal of 65cc/hr   [x] Protonix    Bowel regimen with Miralax and senna  Reglan for gut motility    ***Renal***  ERIC, renal following CVVHD started 12/5  Continue to monitor I/Os, BUN/Creatinine.   Replete lytes PRN  De Dios present  CRRT Treatment    ***ID***  Empiric coverage with Vancomycin and Meropenem   Diflucan for prophylaxis   Plan to CT to rule out infection     ***Endocrine***  [x]  DM2: HbA1c 7.3%                - [x] ISS             - Need tight glycemic control to prevent wound infection.        Patient requires continuous monitoring with:  bedside rhythm monitoring, arterial line, pulse oximetry, ventilator management and monitoring; intermittent blood gas analysis.  Care plan discussed with ICU care team.  patient remain critical; required more than usual post op care; I have spent 50 minutes providing non routine post op care, revaluated multiple times during the day .       Care Plan discussed with the ICU care team. Patient remained critical, at risk for life threatening decompensation.     Patient seen and examined at the bedside.    Remained critically ill on continuous ICU monitoring.    OBJECTIVE:   s/p Mitral clip  s/p trach          albuterol/ipratropium for Nebulization 3 milliLiter(s) Nebulizer every 6 hours  aMIOdarone    Tablet 200 milliGRAM(s) Oral daily  atorvastatin 40 milliGRAM(s) Oral at bedtime  chlorhexidine 0.12% Liquid 15 milliLiter(s) Oral Mucosa every 12 hours  CRRT Treatment    <Continuous>  dexMEDEtomidine Infusion 0.4 MICROgram(s)/kG/Hr IV Continuous <Continuous>  heparin  Infusion Syringe 300 Unit(s)/Hr CRRT <Continuous>  insulin regular Infusion 3 Unit(s)/Hr IV Continuous <Continuous>  milrinone Infusion 0.1 MICROgram(s)/kG/Min IV Continuous <Continuous>  multivitamin/minerals/iron Oral Solution (CENTRUM) 15 milliLiter(s) Oral daily  norepinephrine Infusion 0.05 MICROgram(s)/kG/Min IV Continuous <Continuous>  pantoprazole  Injectable 40 milliGRAM(s) IV Push daily  PrismaSATE Dialysate BK 0 / 3.5 5000 milliLiter(s) CRRT <Continuous>  PrismaSOL Filtration BGK 0 / 2.5 5000 milliLiter(s) CRRT <Continuous>  PrismaSOL Filtration BGK 0 / 2.5 5000 milliLiter(s) CRRT <Continuous>  propofol Infusion 20 MICROgram(s)/kG/Min IV Continuous <Continuous>  thiamine 100 milliGRAM(s) Enteral Tube daily  vasopressin Infusion 0.02 Unit(s)/Min IV Continuous <Continuous>                            10.1   15.70 )-----------( 114      ( 16 Dec 2022 10:58 )             32.9       Hemoglobin: 10.1 g/dL (12-16 @ 10:58)  Hemoglobin: 8.8 g/dL (12-16 @ 00:24)  Hemoglobin: 8.8 g/dL (12-15 @ 00:45)  Hemoglobin: 9.2 g/dL (12-14 @ 00:34)  Hemoglobin: 9.0 g/dL (12-13 @ 01:08)      12-16    135  |  101  |  22  ----------------------------<  285<H>  5.7<H>   |  18<L>  |  1.88<H>    Ca    8.3<L>      16 Dec 2022 10:55  Phos  6.2     12-16  Mg     3.0     12-16    TPro  7.0  /  Alb  3.4  /  TBili  0.9  /  DBili  x   /  AST  44<H>  /  ALT  18  /  AlkPhos  116  12-16    Creatinine Trend: 1.88<--, 1.41<--, 1.63<--, 1.43<--, 1.38<--, 1.55<--    COAGS: PT/INR - ( 16 Dec 2022 10:55 )   PT: 12.7 sec;   INR: 1.10 ratio         PTT - ( 16 Dec 2022 13:08 )  PTT:27.7 sec    CARDIAC MARKERS ( 16 Dec 2022 10:55 )  x     / x     / 27 U/L / x     / 1.6 ng/mL        T(C): 34.4 (12-16-22 @ 20:00), Max: 37.7 (12-16-22 @ 10:30)  HR: 88 (12-16-22 @ 21:44) (80 - 128)  BP: 116/32 (12-16-22 @ 05:27) (116/32 - 116/32)  RR: 12 (12-16-22 @ 21:30) (10 - 38)  SpO2: 100% (12-16-22 @ 21:44) (98% - 100%)  Wt(kg): --    I&O's Summary    15 Dec 2022 07:01  -  16 Dec 2022 07:00  --------------------------------------------------------  IN: 2201.7 mL / OUT: 5896 mL / NET: -3694.3 mL    16 Dec 2022 07:01  -  16 Dec 2022 22:43  --------------------------------------------------------  IN: 888.9 mL / OUT: 310 mL / NET: 578.9 mL          Physical Exam:   General: Intubated, multiple lines gtt  Neurology: Sedated  Eyes: bilateral pupils equal and reactive   ENT/Neck: +ETT midline, Neck supple, trachea midline, No JVD   Respiratory: rhonchi bilaterally   CV: S1S2, no murmurs        [x] Sinus Rhythm   Abdominal: Softly distended,+BS   Extremities: 1+ pedal edema noted, + peripheral pulses palpable, warm  Skin: No Rashes, Hematoma, Ecchymosis              ======================Micro/Rad/Cardio=================  Culture: Reviewed   CXR: Reviewed  Echo: Reviewed  ======================================================  PAST MEDICAL & SURGICAL HISTORY:      ======================================================  Assessment:  61 y/o male with PMH of CABG, DM, HTN, HLD presenting as transfer from [place] for c/f STEMI. PTA arrival received 325 aspirin, 600 plavix, and no heparin drip started. Around 1:30 am patient had multiple episodes of non-bloody diarrhea and emesis with associated abdominal pain and chest pain, unable to localize, non-radiating, with associated SOB and no associated palpitations or diaphoresis. No recent fevers/chills, cough, rashes, or changes in urination. Does report mild diffuse back pain; started 5 days ago in setting on injury while walking and lifting objects. Denies focal weakness, numbness/tingling, or LOC due does report mild dizziness.    acute respiratory distress/failure, intubated ,   cardiogenic shock s/p VA ECMO decannulated 12/8  CAD/ASHD/CABG, acute MI  cardiogenic shock  Hemodynamic instability  acute renal failure  encounter management IABP  encounter weaning ventilator  Leukocytosis   Thrombocytopenia  Anemia  acute pancreatitis  ======================================================  Plan:   ***Neuro***  CT head showed 4mm subdural hematoma 11/26: repeat CT head unchanged 12/01,  [x] Sedated with [x] Precedex for vent synchrony  Post operative neuro assessment   Soft palate laceration, ENT scoped 12/8, stable, no acute intervention at this time  Repeat Head CT 12/13 Similar minimal increased density along the right aspect of the posterior   falx and right tentorial leaf, consistent with minimal residual subdural   hemorrhage.    ***Cardiovascular***  12/8 TTE EF 20-25%, Severe global left ventricular systolic dysfunction.  12/8 GRZEGORZ EF 40-45%, MR, TR, normal RV function  12/7 TTE turndown EF 30-40%, normal RV, MR mod   Invasive hemodynamic monitoring, assess perfusion indices   SR / CVP 10/ MAP 74/ Hct 28.7/ Lactate 0.9  [x] Primacor- 0.1 mcgs/kg/min [x] Vasopressin 0.02 Units  [x] 12/7 cath patent LIMA graft and other grafts occluded, femoral IABP placed, with good augmentation @ 1:1,  [x] 12/8 ECMO d/c'd  PO Amiodarone for afib prophylaxis   Continuos reassessment of hemodynamics   12/8 Aflutter s/p DCCV x2  [x] AC Therapy with Heparin gtt PTT 44  [x] Statin   11/29 HIT NG, 12/8 MARY Neg  Remains on primacor    ***Pulmonary***  Post op vent management   Titration of FiO2 and PEEP, follow SpO2, CXR, blood gasses   CPAP trials as tolerated      Mode: AC/ CMV (Assist Control/ Continuous Mandatory Ventilation)  RR (machine): 14  FiO2: 40  PEEP: 5  ITime: 1  MAP: 10  PC: 12  PIP: 19              ***GI***  [x] NPO with TF's Glucerna 1.5 @ goal of 65cc/hr   [x] Protonix    Bowel regimen with Miralax and senna  Reglan for gut motility    ***Renal***  ERIC, renal following CVVHD started 12/5  Continue to monitor I/Os, BUN/Creatinine.   Replete lytes PRN  De Dios present  CRRT Treatment    ***ID***  Empiric coverage with Vancomycin and Meropenem   Diflucan for prophylaxis   Plan to CT to rule out infection     ***Endocrine***  [x]  DM2: HbA1c 7.3%                - [x] ISS             - Need tight glycemic control to prevent wound infection.        Patient requires continuous monitoring with:  bedside rhythm monitoring, arterial line, pulse oximetry, ventilator management and monitoring; intermittent blood gas analysis.  Care plan discussed with ICU care team.  patient remain critical; required more than usual post op care; I have spent 50 minutes providing non routine post op care, revaluated multiple times during the day .       Care Plan discussed with the ICU care team. Patient remained critical, at risk for life threatening decompensation.     none

## 2022-12-16 NOTE — PROGRESS NOTE ADULT - SUBJECTIVE AND OBJECTIVE BOX
ENT ISSUE/POD: S/P #7 Proximal XLT Tracheostomy POD # 0.    HPI: 61YO M with PMH of  CABG, DM, HTN, HLD presenting as transfer from [place] for STEMI. Course c/b gallstone pancreatitis leading to ARDS, shock, cardiac arrest then placed on [facility] ECMO. Pt underwent ECMO decannulation. ENT called to evaluate for tracheostomy.  Pt failed weaning trials and is not a candidate for extubation as per primary team. Now S/P #7 Proximal XLT Tracheostomy POD # 0. In OR Surgicel placed around the stoma. Doing well on the ventilator.  PEEP:5, FiO2:50%.     PAST MEDICAL & SURGICAL HISTORY:    Allergies  No Known Allergies    Intolerances    MEDICATIONS  (STANDING):  albuterol/ipratropium for Nebulization 3 milliLiter(s) Nebulizer every 6 hours  aMIOdarone    Tablet 200 milliGRAM(s) Oral daily  atorvastatin 40 milliGRAM(s) Oral at bedtime  chlorhexidine 0.12% Liquid 15 milliLiter(s) Oral Mucosa every 12 hours  CRRT Treatment    <Continuous>  dexMEDEtomidine Infusion 0.4 MICROgram(s)/kG/Hr (9.29 mL/Hr) IV Continuous <Continuous>  heparin  Infusion 1000 Unit(s)/Hr (10 mL/Hr) IV Continuous <Continuous>  heparin  Infusion Syringe 300 Unit(s)/Hr (0.6 mL/Hr) CRRT <Continuous>  insulin regular Infusion 3 Unit(s)/Hr (3 mL/Hr) IV Continuous <Continuous>  milrinone Infusion 0.1 MICROgram(s)/kG/Min (2.79 mL/Hr) IV Continuous <Continuous>  multivitamin/minerals/iron Oral Solution (CENTRUM) 15 milliLiter(s) Oral daily  norepinephrine Infusion 0.05 MICROgram(s)/kG/Min (8.71 mL/Hr) IV Continuous <Continuous>  pantoprazole  Injectable 40 milliGRAM(s) IV Push daily  PrismaSATE Dialysate BK 0 / 3.5 5000 milliLiter(s) (2400 mL/Hr) CRRT <Continuous>  PrismaSOL Filtration BGK 0 / 2.5 5000 milliLiter(s) (800 mL/Hr) CRRT <Continuous>  PrismaSOL Filtration BGK 4 / 2.5 5000 milliLiter(s) (200 mL/Hr) CRRT <Continuous>  propofol Infusion 20 MICROgram(s)/kG/Min (11.1 mL/Hr) IV Continuous <Continuous>  thiamine 100 milliGRAM(s) Enteral Tube daily  vasopressin Infusion 0.02 Unit(s)/Min (3 mL/Hr) IV Continuous <Continuous>    MEDICATIONS  (PRN):    Social History: SEE CONSULT.   Family history: SEE CONSULT.     ROS:   ENT: Unable to Obtain.    Vital Signs Last 24 Hrs  T(C): 35.7 (17 Dec 2022 00:00), Max: 37.7 (16 Dec 2022 10:30)  T(F): 96.3 (17 Dec 2022 00:00), Max: 99.9 (16 Dec 2022 10:30)  HR: 112 (17 Dec 2022 03:00) (79 - 128)  BP: 116/32 (16 Dec 2022 05:27) (116/32 - 116/32)  BP(mean): 76 (16 Dec 2022 16:25) (76 - 76)  RR: 20 (17 Dec 2022 03:00) (10 - 38)  SpO2: 100% (17 Dec 2022 03:00) (98% - 100%)  Parameters below as of 17 Dec 2022 00:00  Patient On (Oxygen Delivery Method): ventilator  O2 Concentration (%): 50                        8.2    11.33 )-----------( 77       ( 17 Dec 2022 00:12 )             27.0    12-17    136  |  102  |  19  ----------------------------<  121<H>  3.7   |  22  |  1.41<H>    Ca    8.5      17 Dec 2022 00:12  Phos  3.5     12-17  Mg     2.4     12-17  TPro  6.2  /  Alb  2.7<L>  /  TBili  1.0  /  DBili  x   /  AST  36  /  ALT  14  /  AlkPhos  100  12-17   PT/INR - ( 17 Dec 2022 00:12 )   PT: 11.6 sec;   INR: 1.01 ratio     PTT - ( 17 Dec 2022 00:12 )  PTT:27.6 sec    PHYSICAL EXAM:  Gen: NAD, On Vent.   Skin: No rashes, bruises, or lesions.  Head: NC/AT.  Face: no edema, erythema, or fluctuance. Parotid glands soft without mass.  Eyes: no scleral injection.  Nose: Nares bilaterally patent, no discharge  Mouth: No Stridor / Drooling / Trismus.  Mucosa moist, tongue/uvula midline, oropharynx clear  Neck: #7 Proximal XLT Trach  secured with sutures x 4 and Umbilical Tie. Surgicel around inferior and superior stoma. No hematoma/crepitus. Flat, supple, no lymphadenopathy, trachea midline, no masses.  Lymphatic: No lymphadenopathy  Resp: On Vent.   Neuro: Unable to obtain.  ENT ISSUE/POD: S/P #7 Proximal XLT Tracheostomy POD # 0.    HPI: 61YO M with PMH of  CABG, DM, HTN, HLD presenting as transfer from [place] for STEMI. Course c/b gallstone pancreatitis leading to ARDS, shock, cardiac arrest then placed on [facility] ECMO. Pt underwent ECMO decannulation. ENT called to evaluate for tracheostomy.  Pt failed weaning trials and is not a candidate for extubation as per primary team. Now S/P #7 Proximal XLT Tracheostomy POD # 0. In OR Surgicel placed around the stoma. Doing well on the ventilator.  PEEP:5, FiO2:50%.     PAST MEDICAL & SURGICAL HISTORY:    Allergies  No Known Allergies    Intolerances    MEDICATIONS  (STANDING):  albuterol/ipratropium for Nebulization 3 milliLiter(s) Nebulizer every 6 hours  aMIOdarone    Tablet 200 milliGRAM(s) Oral daily  atorvastatin 40 milliGRAM(s) Oral at bedtime  chlorhexidine 0.12% Liquid 15 milliLiter(s) Oral Mucosa every 12 hours  CRRT Treatment    <Continuous>  dexMEDEtomidine Infusion 0.4 MICROgram(s)/kG/Hr (9.29 mL/Hr) IV Continuous <Continuous>  heparin  Infusion 1000 Unit(s)/Hr (10 mL/Hr) IV Continuous <Continuous>  heparin  Infusion Syringe 300 Unit(s)/Hr (0.6 mL/Hr) CRRT <Continuous>  insulin regular Infusion 3 Unit(s)/Hr (3 mL/Hr) IV Continuous <Continuous>  milrinone Infusion 0.1 MICROgram(s)/kG/Min (2.79 mL/Hr) IV Continuous <Continuous>  multivitamin/minerals/iron Oral Solution (CENTRUM) 15 milliLiter(s) Oral daily  norepinephrine Infusion 0.05 MICROgram(s)/kG/Min (8.71 mL/Hr) IV Continuous <Continuous>  pantoprazole  Injectable 40 milliGRAM(s) IV Push daily  PrismaSATE Dialysate BK 0 / 3.5 5000 milliLiter(s) (2400 mL/Hr) CRRT <Continuous>  PrismaSOL Filtration BGK 0 / 2.5 5000 milliLiter(s) (800 mL/Hr) CRRT <Continuous>  PrismaSOL Filtration BGK 4 / 2.5 5000 milliLiter(s) (200 mL/Hr) CRRT <Continuous>  propofol Infusion 20 MICROgram(s)/kG/Min (11.1 mL/Hr) IV Continuous <Continuous>  thiamine 100 milliGRAM(s) Enteral Tube daily  vasopressin Infusion 0.02 Unit(s)/Min (3 mL/Hr) IV Continuous <Continuous>    MEDICATIONS  (PRN):    Social History: SEE CONSULT.   Family history: SEE CONSULT.     ROS:   ENT: Unable to Obtain.    Vital Signs Last 24 Hrs  T(C): 35.7 (17 Dec 2022 00:00), Max: 37.7 (16 Dec 2022 10:30)  T(F): 96.3 (17 Dec 2022 00:00), Max: 99.9 (16 Dec 2022 10:30)  HR: 112 (17 Dec 2022 03:00) (79 - 128)  BP: 116/32 (16 Dec 2022 05:27) (116/32 - 116/32)  BP(mean): 76 (16 Dec 2022 16:25) (76 - 76)  RR: 20 (17 Dec 2022 03:00) (10 - 38)  SpO2: 100% (17 Dec 2022 03:00) (98% - 100%)  Parameters below as of 17 Dec 2022 00:00  Patient On (Oxygen Delivery Method): ventilator  O2 Concentration (%): 50                        8.2    11.33 )-----------( 77       ( 17 Dec 2022 00:12 )             27.0    12-17    136  |  102  |  19  ----------------------------<  121<H>  3.7   |  22  |  1.41<H>    Ca    8.5      17 Dec 2022 00:12  Phos  3.5     12-17  Mg     2.4     12-17  TPro  6.2  /  Alb  2.7<L>  /  TBili  1.0  /  DBili  x   /  AST  36  /  ALT  14  /  AlkPhos  100  12-17   PT/INR - ( 17 Dec 2022 00:12 )   PT: 11.6 sec;   INR: 1.01 ratio     PTT - ( 17 Dec 2022 00:12 )  PTT:27.6 sec    PHYSICAL EXAM:  Gen: NAD, On Vent.   Skin: No rashes, bruises, or lesions.  Head: NC/AT.  Face: no edema, erythema, or fluctuance. Parotid glands soft without mass.  Eyes: no scleral injection.  Nose: Nares bilaterally patent, no discharge  Mouth: No Stridor / Drooling / Trismus.  Mucosa moist, tongue/uvula midline, oropharynx clear  Neck: #7 Proximal XLT Trach  secured with sutures x 4 and Umbilical Tie. Surgicel around inferior and superior stoma. No hematoma/crepitus. Flat, supple, no lymphadenopathy, trachea midline, no masses.  Lymphatic: No lymphadenopathy  Resp: On Vent.   Neuro: Unable to obtain.  ENT ISSUE/POD: S/P #7 Proximal XLT Tracheostomy POD # 0.    HPI: 63YO M with PMH of  CABG, DM, HTN, HLD presenting as transfer from [place] for STEMI. Course c/b gallstone pancreatitis leading to ARDS, shock, cardiac arrest then placed on [facility] ECMO. Pt underwent ECMO decannulation. ENT called to evaluate for tracheostomy.  Pt failed weaning trials and is not a candidate for extubation as per primary team. Now S/P #7 Proximal XLT Tracheostomy POD # 0. In OR Surgicel placed around the stoma. Doing well on the ventilator.  PEEP:5, FiO2:50%.     PAST MEDICAL & SURGICAL HISTORY:    Allergies  No Known Allergies    Intolerances    MEDICATIONS  (STANDING):  albuterol/ipratropium for Nebulization 3 milliLiter(s) Nebulizer every 6 hours  aMIOdarone    Tablet 200 milliGRAM(s) Oral daily  atorvastatin 40 milliGRAM(s) Oral at bedtime  chlorhexidine 0.12% Liquid 15 milliLiter(s) Oral Mucosa every 12 hours  CRRT Treatment    <Continuous>  dexMEDEtomidine Infusion 0.4 MICROgram(s)/kG/Hr (9.29 mL/Hr) IV Continuous <Continuous>  heparin  Infusion 1000 Unit(s)/Hr (10 mL/Hr) IV Continuous <Continuous>  heparin  Infusion Syringe 300 Unit(s)/Hr (0.6 mL/Hr) CRRT <Continuous>  insulin regular Infusion 3 Unit(s)/Hr (3 mL/Hr) IV Continuous <Continuous>  milrinone Infusion 0.1 MICROgram(s)/kG/Min (2.79 mL/Hr) IV Continuous <Continuous>  multivitamin/minerals/iron Oral Solution (CENTRUM) 15 milliLiter(s) Oral daily  norepinephrine Infusion 0.05 MICROgram(s)/kG/Min (8.71 mL/Hr) IV Continuous <Continuous>  pantoprazole  Injectable 40 milliGRAM(s) IV Push daily  PrismaSATE Dialysate BK 0 / 3.5 5000 milliLiter(s) (2400 mL/Hr) CRRT <Continuous>  PrismaSOL Filtration BGK 0 / 2.5 5000 milliLiter(s) (800 mL/Hr) CRRT <Continuous>  PrismaSOL Filtration BGK 4 / 2.5 5000 milliLiter(s) (200 mL/Hr) CRRT <Continuous>  propofol Infusion 20 MICROgram(s)/kG/Min (11.1 mL/Hr) IV Continuous <Continuous>  thiamine 100 milliGRAM(s) Enteral Tube daily  vasopressin Infusion 0.02 Unit(s)/Min (3 mL/Hr) IV Continuous <Continuous>    MEDICATIONS  (PRN):    Social History: SEE CONSULT.   Family history: SEE CONSULT.     ROS:   ENT: Unable to Obtain.    Vital Signs Last 24 Hrs  T(C): 35.7 (17 Dec 2022 00:00), Max: 37.7 (16 Dec 2022 10:30)  T(F): 96.3 (17 Dec 2022 00:00), Max: 99.9 (16 Dec 2022 10:30)  HR: 112 (17 Dec 2022 03:00) (79 - 128)  BP: 116/32 (16 Dec 2022 05:27) (116/32 - 116/32)  BP(mean): 76 (16 Dec 2022 16:25) (76 - 76)  RR: 20 (17 Dec 2022 03:00) (10 - 38)  SpO2: 100% (17 Dec 2022 03:00) (98% - 100%)  Parameters below as of 17 Dec 2022 00:00  Patient On (Oxygen Delivery Method): ventilator  O2 Concentration (%): 50                        8.2    11.33 )-----------( 77       ( 17 Dec 2022 00:12 )             27.0    12-17    136  |  102  |  19  ----------------------------<  121<H>  3.7   |  22  |  1.41<H>    Ca    8.5      17 Dec 2022 00:12  Phos  3.5     12-17  Mg     2.4     12-17  TPro  6.2  /  Alb  2.7<L>  /  TBili  1.0  /  DBili  x   /  AST  36  /  ALT  14  /  AlkPhos  100  12-17   PT/INR - ( 17 Dec 2022 00:12 )   PT: 11.6 sec;   INR: 1.01 ratio     PTT - ( 17 Dec 2022 00:12 )  PTT:27.6 sec    PHYSICAL EXAM:  Gen: NAD, On Vent.   Skin: No rashes, bruises, or lesions.  Head: NC/AT.  Face: no edema, erythema, or fluctuance. Parotid glands soft without mass.  Eyes: no scleral injection.  Nose: Nares bilaterally patent, no discharge  Mouth: No Stridor / Drooling / Trismus.  Mucosa moist, tongue/uvula midline, oropharynx clear  Neck: #7 Proximal XLT Trach  secured with sutures x 4 and Umbilical Tie. Surgicel around inferior and superior stoma. No hematoma/crepitus. Flat, supple, no lymphadenopathy, trachea midline, no masses.  Lymphatic: No lymphadenopathy  Resp: On Vent.   Neuro: Unable to obtain.  ENT ISSUE/POD: S/P #7 Proximal XLT Tracheostomy POD # 0.    HPI: 61 YO M with PMH of CABG, DM, HTN, HLD presenting as transfer from [place] for STEMI. Course c/b gallstone pancreatitis leading to ARDS, shock, cardiac arrest then placed on VA ECMO. Pt underwent ECMO decannulation. ENT called to evaluate for tracheostomy.  Pt failed weaning trials and is not a candidate for extubation as per primary team. Now S/P #7 Proximal XLT Tracheostomy POD # 0. In OR Surgicel placed around the stoma. Doing well on the ventilator.  PEEP:5, FiO2:50%.     PAST MEDICAL & SURGICAL HISTORY:    Allergies  No Known Allergies    Intolerances    MEDICATIONS  (STANDING):  albuterol/ipratropium for Nebulization 3 milliLiter(s) Nebulizer every 6 hours  aMIOdarone    Tablet 200 milliGRAM(s) Oral daily  atorvastatin 40 milliGRAM(s) Oral at bedtime  chlorhexidine 0.12% Liquid 15 milliLiter(s) Oral Mucosa every 12 hours  CRRT Treatment    <Continuous>  dexMEDEtomidine Infusion 0.4 MICROgram(s)/kG/Hr (9.29 mL/Hr) IV Continuous <Continuous>  heparin  Infusion 1000 Unit(s)/Hr (10 mL/Hr) IV Continuous <Continuous>  heparin  Infusion Syringe 300 Unit(s)/Hr (0.6 mL/Hr) CRRT <Continuous>  insulin regular Infusion 3 Unit(s)/Hr (3 mL/Hr) IV Continuous <Continuous>  milrinone Infusion 0.1 MICROgram(s)/kG/Min (2.79 mL/Hr) IV Continuous <Continuous>  multivitamin/minerals/iron Oral Solution (CENTRUM) 15 milliLiter(s) Oral daily  norepinephrine Infusion 0.05 MICROgram(s)/kG/Min (8.71 mL/Hr) IV Continuous <Continuous>  pantoprazole  Injectable 40 milliGRAM(s) IV Push daily  PrismaSATE Dialysate BK 0 / 3.5 5000 milliLiter(s) (2400 mL/Hr) CRRT <Continuous>  PrismaSOL Filtration BGK 0 / 2.5 5000 milliLiter(s) (800 mL/Hr) CRRT <Continuous>  PrismaSOL Filtration BGK 4 / 2.5 5000 milliLiter(s) (200 mL/Hr) CRRT <Continuous>  propofol Infusion 20 MICROgram(s)/kG/Min (11.1 mL/Hr) IV Continuous <Continuous>  thiamine 100 milliGRAM(s) Enteral Tube daily  vasopressin Infusion 0.02 Unit(s)/Min (3 mL/Hr) IV Continuous <Continuous>    MEDICATIONS  (PRN):    Social History: SEE CONSULT.   Family history: SEE CONSULT.     ROS:   ENT: Unable to Obtain.    Vital Signs Last 24 Hrs  T(C): 35.7 (17 Dec 2022 00:00), Max: 37.7 (16 Dec 2022 10:30)  T(F): 96.3 (17 Dec 2022 00:00), Max: 99.9 (16 Dec 2022 10:30)  HR: 112 (17 Dec 2022 03:00) (79 - 128)  BP: 116/32 (16 Dec 2022 05:27) (116/32 - 116/32)  BP(mean): 76 (16 Dec 2022 16:25) (76 - 76)  RR: 20 (17 Dec 2022 03:00) (10 - 38)  SpO2: 100% (17 Dec 2022 03:00) (98% - 100%)  Parameters below as of 17 Dec 2022 00:00  Patient On (Oxygen Delivery Method): ventilator  O2 Concentration (%): 50                        8.2    11.33 )-----------( 77       ( 17 Dec 2022 00:12 )             27.0    12-17    136  |  102  |  19  ----------------------------<  121<H>  3.7   |  22  |  1.41<H>    Ca    8.5      17 Dec 2022 00:12  Phos  3.5     12-17  Mg     2.4     12-17  TPro  6.2  /  Alb  2.7<L>  /  TBili  1.0  /  DBili  x   /  AST  36  /  ALT  14  /  AlkPhos  100  12-17   PT/INR - ( 17 Dec 2022 00:12 )   PT: 11.6 sec;   INR: 1.01 ratio     PTT - ( 17 Dec 2022 00:12 )  PTT:27.6 sec    PHYSICAL EXAM:  Gen: NAD, On Vent.   Skin: No rashes, bruises, or lesions.  Head: NC/AT.  Face: no edema, erythema, or fluctuance. Parotid glands soft without mass.  Eyes: no scleral injection.  Nose: Nares bilaterally patent, no discharge  Mouth: No Stridor / Drooling / Trismus.  Mucosa moist, tongue/uvula midline, oropharynx clear  Neck: #7 Proximal XLT Trach  secured with sutures x 4 and Umbilical Tie. Surgicel around inferior and superior stoma. No hematoma/crepitus. Flat, supple, no lymphadenopathy, trachea midline, no masses.  Lymphatic: No lymphadenopathy  Resp: On Vent.   Neuro: Unable to obtain.  ENT ISSUE/POD: S/P #7 Proximal XLT Tracheostomy POD # 0.    HPI: 61 YO M with PMH of CABG, DM, HTN, HLD presenting as transfer from [place] for STEMI. Course c/b gallstone pancreatitis leading to ARDS, shock, cardiac arrest then placed on VA ECMO. Pt underwent ECMO decannulation. ENT called to evaluate for tracheostomy.  Pt failed weaning trials and is not a candidate for extubation as per primary team. Now S/P #7 Proximal XLT Tracheostomy POD # 0. In OR Surgicel placed around the stoma. Doing well on the ventilator.  PEEP:5, FiO2:50%.     PAST MEDICAL & SURGICAL HISTORY:    Allergies  No Known Allergies    Intolerances    MEDICATIONS  (STANDING):  albuterol/ipratropium for Nebulization 3 milliLiter(s) Nebulizer every 6 hours  aMIOdarone    Tablet 200 milliGRAM(s) Oral daily  atorvastatin 40 milliGRAM(s) Oral at bedtime  chlorhexidine 0.12% Liquid 15 milliLiter(s) Oral Mucosa every 12 hours  CRRT Treatment    <Continuous>  dexMEDEtomidine Infusion 0.4 MICROgram(s)/kG/Hr (9.29 mL/Hr) IV Continuous <Continuous>  heparin  Infusion 1000 Unit(s)/Hr (10 mL/Hr) IV Continuous <Continuous>  heparin  Infusion Syringe 300 Unit(s)/Hr (0.6 mL/Hr) CRRT <Continuous>  insulin regular Infusion 3 Unit(s)/Hr (3 mL/Hr) IV Continuous <Continuous>  milrinone Infusion 0.1 MICROgram(s)/kG/Min (2.79 mL/Hr) IV Continuous <Continuous>  multivitamin/minerals/iron Oral Solution (CENTRUM) 15 milliLiter(s) Oral daily  norepinephrine Infusion 0.05 MICROgram(s)/kG/Min (8.71 mL/Hr) IV Continuous <Continuous>  pantoprazole  Injectable 40 milliGRAM(s) IV Push daily  PrismaSATE Dialysate BK 0 / 3.5 5000 milliLiter(s) (2400 mL/Hr) CRRT <Continuous>  PrismaSOL Filtration BGK 0 / 2.5 5000 milliLiter(s) (800 mL/Hr) CRRT <Continuous>  PrismaSOL Filtration BGK 4 / 2.5 5000 milliLiter(s) (200 mL/Hr) CRRT <Continuous>  propofol Infusion 20 MICROgram(s)/kG/Min (11.1 mL/Hr) IV Continuous <Continuous>  thiamine 100 milliGRAM(s) Enteral Tube daily  vasopressin Infusion 0.02 Unit(s)/Min (3 mL/Hr) IV Continuous <Continuous>    MEDICATIONS  (PRN):    Social History: SEE CONSULT.   Family history: SEE CONSULT.     ROS:   ENT: Unable to Obtain.    Vital Signs Last 24 Hrs  T(C): 35.7 (17 Dec 2022 00:00), Max: 37.7 (16 Dec 2022 10:30)  T(F): 96.3 (17 Dec 2022 00:00), Max: 99.9 (16 Dec 2022 10:30)  HR: 112 (17 Dec 2022 03:00) (79 - 128)  BP: 116/32 (16 Dec 2022 05:27) (116/32 - 116/32)  BP(mean): 76 (16 Dec 2022 16:25) (76 - 76)  RR: 20 (17 Dec 2022 03:00) (10 - 38)  SpO2: 100% (17 Dec 2022 03:00) (98% - 100%)  Parameters below as of 17 Dec 2022 00:00  Patient On (Oxygen Delivery Method): ventilator  O2 Concentration (%): 50                        8.2    11.33 )-----------( 77       ( 17 Dec 2022 00:12 )             27.0    12-17    136  |  102  |  19  ----------------------------<  121<H>  3.7   |  22  |  1.41<H>    Ca    8.5      17 Dec 2022 00:12  Phos  3.5     12-17  Mg     2.4     12-17  TPro  6.2  /  Alb  2.7<L>  /  TBili  1.0  /  DBili  x   /  AST  36  /  ALT  14  /  AlkPhos  100  12-17   PT/INR - ( 17 Dec 2022 00:12 )   PT: 11.6 sec;   INR: 1.01 ratio     PTT - ( 17 Dec 2022 00:12 )  PTT:27.6 sec    PHYSICAL EXAM:  Gen: NAD, On Vent.   Skin: No rashes, bruises, or lesions.  Head: NC/AT.  Face: no edema, erythema, or fluctuance. Parotid glands soft without mass.  Eyes: no scleral injection.  Nose: Nares bilaterally patent, no discharge  Mouth: No Stridor / Drooling / Trismus.  Mucosa moist, tongue/uvula midline, oropharynx clear  Neck: #7 Proximal XLT Trach  secured with sutures x 4 and Umbilical Tie. Surgicel around inferior and superior stoma. No hematoma/crepitus. Flat, supple, no lymphadenopathy, trachea midline, no masses.  Lymphatic: No lymphadenopathy  Resp: On Vent.   Neuro: Unable to obtain.  ENT ISSUE/POD: S/P #7 Proximal XLT Tracheostomy POD # 0.    HPI: 63 YO M with PMH of CABG, DM, HTN, HLD presenting as transfer from [place] for STEMI. Course c/b gallstone pancreatitis leading to ARDS, shock, cardiac arrest then placed on VA ECMO. Pt underwent ECMO decannulation. ENT called to evaluate for tracheostomy.  Pt failed weaning trials and is not a candidate for extubation as per primary team. Now S/P #7 Proximal XLT Tracheostomy POD # 0. In OR Surgicel placed around the stoma. Doing well on the ventilator.  PEEP:5, FiO2:50%.     PAST MEDICAL & SURGICAL HISTORY:    Allergies  No Known Allergies    Intolerances    MEDICATIONS  (STANDING):  albuterol/ipratropium for Nebulization 3 milliLiter(s) Nebulizer every 6 hours  aMIOdarone    Tablet 200 milliGRAM(s) Oral daily  atorvastatin 40 milliGRAM(s) Oral at bedtime  chlorhexidine 0.12% Liquid 15 milliLiter(s) Oral Mucosa every 12 hours  CRRT Treatment    <Continuous>  dexMEDEtomidine Infusion 0.4 MICROgram(s)/kG/Hr (9.29 mL/Hr) IV Continuous <Continuous>  heparin  Infusion 1000 Unit(s)/Hr (10 mL/Hr) IV Continuous <Continuous>  heparin  Infusion Syringe 300 Unit(s)/Hr (0.6 mL/Hr) CRRT <Continuous>  insulin regular Infusion 3 Unit(s)/Hr (3 mL/Hr) IV Continuous <Continuous>  milrinone Infusion 0.1 MICROgram(s)/kG/Min (2.79 mL/Hr) IV Continuous <Continuous>  multivitamin/minerals/iron Oral Solution (CENTRUM) 15 milliLiter(s) Oral daily  norepinephrine Infusion 0.05 MICROgram(s)/kG/Min (8.71 mL/Hr) IV Continuous <Continuous>  pantoprazole  Injectable 40 milliGRAM(s) IV Push daily  PrismaSATE Dialysate BK 0 / 3.5 5000 milliLiter(s) (2400 mL/Hr) CRRT <Continuous>  PrismaSOL Filtration BGK 0 / 2.5 5000 milliLiter(s) (800 mL/Hr) CRRT <Continuous>  PrismaSOL Filtration BGK 4 / 2.5 5000 milliLiter(s) (200 mL/Hr) CRRT <Continuous>  propofol Infusion 20 MICROgram(s)/kG/Min (11.1 mL/Hr) IV Continuous <Continuous>  thiamine 100 milliGRAM(s) Enteral Tube daily  vasopressin Infusion 0.02 Unit(s)/Min (3 mL/Hr) IV Continuous <Continuous>    MEDICATIONS  (PRN):    Social History: SEE CONSULT.   Family history: SEE CONSULT.     ROS:   ENT: Unable to Obtain.    Vital Signs Last 24 Hrs  T(C): 35.7 (17 Dec 2022 00:00), Max: 37.7 (16 Dec 2022 10:30)  T(F): 96.3 (17 Dec 2022 00:00), Max: 99.9 (16 Dec 2022 10:30)  HR: 112 (17 Dec 2022 03:00) (79 - 128)  BP: 116/32 (16 Dec 2022 05:27) (116/32 - 116/32)  BP(mean): 76 (16 Dec 2022 16:25) (76 - 76)  RR: 20 (17 Dec 2022 03:00) (10 - 38)  SpO2: 100% (17 Dec 2022 03:00) (98% - 100%)  Parameters below as of 17 Dec 2022 00:00  Patient On (Oxygen Delivery Method): ventilator  O2 Concentration (%): 50                        8.2    11.33 )-----------( 77       ( 17 Dec 2022 00:12 )             27.0    12-17    136  |  102  |  19  ----------------------------<  121<H>  3.7   |  22  |  1.41<H>    Ca    8.5      17 Dec 2022 00:12  Phos  3.5     12-17  Mg     2.4     12-17  TPro  6.2  /  Alb  2.7<L>  /  TBili  1.0  /  DBili  x   /  AST  36  /  ALT  14  /  AlkPhos  100  12-17   PT/INR - ( 17 Dec 2022 00:12 )   PT: 11.6 sec;   INR: 1.01 ratio     PTT - ( 17 Dec 2022 00:12 )  PTT:27.6 sec    PHYSICAL EXAM:  Gen: NAD, On Vent.   Skin: No rashes, bruises, or lesions.  Head: NC/AT.  Face: no edema, erythema, or fluctuance. Parotid glands soft without mass.  Eyes: no scleral injection.  Nose: Nares bilaterally patent, no discharge  Mouth: No Stridor / Drooling / Trismus.  Mucosa moist, tongue/uvula midline, oropharynx clear  Neck: #7 Proximal XLT Trach  secured with sutures x 4 and Umbilical Tie. Surgicel around inferior and superior stoma. No hematoma/crepitus. Flat, supple, no lymphadenopathy, trachea midline, no masses.  Lymphatic: No lymphadenopathy  Resp: On Vent.   Neuro: Unable to obtain.

## 2022-12-16 NOTE — PROGRESS NOTE ADULT - PROBLEM SELECTOR PLAN 1
Pt with non oliguric ERIC/ ATN in the setting of STEMI, cardiac arrest & cardiogenic shock  SCr on arrival was 2.15; trended down to hector 1.6 on 11/25;  slowly trended up & increased to 3.95 11/28.   Pt received IV diuretics. SCr increased to 4.19 with BUN of 101 on 12/5/22. Pt with significant volume overload. Pt initiated on CRRT/CVVHDF to optimize volume and electrolytes on 12/5/22. BP being maintained on vasopressors. Pt likely with ATN. Pt underwent decannulation of ECMO on 12/8/22 and was taken off CRRT. Pt reinitiated on CRRT overnight on 12/9/22 for volume overload. Labs reviewed. Pt remains hemodynamically unstable with BP maintained on IV vasopressors. Plan is to continue CRRT.. Monitor labs and urine output. Avoid any potential nephrotoxins. Dose medications as per eGFR.

## 2022-12-16 NOTE — PROGRESS NOTE ADULT - PROBLEM SELECTOR PLAN 1
- Keep the Trach site clean and dry.   - Remove Surgicel on POD #1.   - Remove Sutures on POD #7.   - Keep the Omniflex for a week to avoid the manipulation of the stoma.   - Elevate HOB.   - Gentle suction prn.   - Vent per RT.   - ENT will follow.   - Call with questions.     # [id number]  ENT PA - Keep the Trach site clean and dry.   - Remove Surgicel on POD #1.   - Remove Sutures on POD #7.   - Keep the Omniflex for a week to avoid the manipulation of the stoma.   - Elevate HOB.   - Gentle suction prn.   - Vent per RT.   - ENT will follow.   - Call with questions.     # [id number]  ENT PA - Keep the Trach site clean and dry.   - Remove Surgicel on POD #1.   - Remove Sutures on POD #7.   - Keep the Omniflex for a week to avoid the manipulation of the stoma.   - Elevate HOB.   - Gentle suction prn.   - Vent per RT.   - ENT will follow.   - Call with questions.     # [phone]  ENT PA

## 2022-12-16 NOTE — PROGRESS NOTE ADULT - SUBJECTIVE AND OBJECTIVE BOX
[facility] DIVISION OF KIDNEY DISEASES AND HYPERTENSION   FOLLOW UP NOTE    --------------------------------------------------------------------------------  HPI:  63 y/o male with PMH of CABG, DM, HTN, HLD presenting as transfer from [place] for c/f STEMI. Course c/b gallstone pancreatitis leading to ARDS and shock & cardiac arrest now on [facility] ECMO. Had significant troponin elevation and his LVEF down to 8%. Pt was deemed to be too unstable to have an angiogram and potential PCI due to his co-morbidities & a new subdural bleed. Pt being seen for ERIC. SCr on arrival was 2.15; trended down to hector 1.6 on 11/25 and eventually increased to 3.95 11/28. Pt received IV diuretics as per CTU.    Patient seen & examined today. Pt is intubated, unable to obtain ROS. Pt initiated on CRRT on 12/5/22 to optimize volume status and electrolytes. Pt tolerating CRRT overnight with target net negative fluid balance. Pt underwent decannulation of ECMO on 12/8/22. Pt was restarted overnight on 12/9/22 for volume overload. Pt with clotting of CRRT circuit on 12/14 and was initiated on heparin with CRRT circuit.  Pt tolerating CRRT with no clotting of filter overnight. Pt underwent mitral clip OR today (12/16/22).     PAST HISTORY  --------------------------------------------------------------------------------  No significant changes to PMH, PSH, FHx, SHx, unless otherwise noted    ALLERGIES & MEDICATIONS  --------------------------------------------------------------------------------  Allergies    No Known Allergies    Intolerances      Standing Inpatient Medications  aMIOdarone IVPB 150 milliGRAM(s) IV Intermittent once  aMIOdarone IVPB 150 milliGRAM(s) IV Intermittent once  magnesium sulfate  IVPB 2 Gram(s) IV Intermittent once    PRN Inpatient Medications      REVIEW OF SYSTEMS  --------------------------------------------------------------------------------  Unable to obtain    VITALS/PHYSICAL EXAM  --------------------------------------------------------------------------------  T(C): 37.4 (12-16-22 @ 05:27), Max: 37.4 (12-16-22 @ 05:27)  HR: 114 (12-16-22 @ 10:54) (92 - 114)  BP: 116/32 (12-16-22 @ 05:27) (108/54 - 116/32)  RR: 11 (12-16-22 @ 06:00) (10 - 28)  SpO2: 99% (12-16-22 @ 10:54) (98% - 100%)  Wt(kg): --  Height (cm): 172.7 (12-16-22 @ 05:27)  Weight (kg): 92.9 (12-16-22 @ 05:27)  BMI (kg/m2): 31.1 (12-16-22 @ 05:27)  BSA (m2): 2.06 (12-16-22 @ 05:27)      12-15-22 @ 07:01  -  12-16-22 @ 07:00  --------------------------------------------------------  IN: 2201.7 mL / OUT: 5896 mL / NET: -3694.3 mL    Physical Exam:  	 Gen: ill appearing male intubated   	HEENT: Anicteric  	Pulm: on CMV via ETT+  	CV: S1S2+  	Abd: Soft, +BS       	Ext: LE edema B/L++  	Neuro: awake and nods spontaneously  	Skin: Warm and dry              Dialysis acces: right non tunneled HD catheter    LABS/STUDIES  --------------------------------------------------------------------------------              8.8    12.42 >-----------<  104      [12-16-22 @ 00:24]              28.4     137  |  103  |  19  ----------------------------<  152      [12-16-22 @ 00:23]  4.5   |  22  |  1.41        Ca     8.0     [12-16-22 @ 00:23]      Mg     2.6     [12-16-22 @ 00:23]      Phos  4.3     [12-16-22 @ 00:23]    Creatinine Trend:  SCr 1.41 [12-16 @ 00:23]  SCr 1.63 [12-15 @ 00:46]  SCr 1.43 [12-14 @ 00:34]  SCr 1.38 [12-13 @ 01:08]  SCr 1.55 [12-12 @ 00:41]    Urinalysis - [12-16-22 @ 04:28]      Color DK BROWN / Appearance Turbid / SG 1.029 / pH 6.0      Gluc Trace / Ketone Negative  / Bili Negative / Urobili 6 mg/dL       Blood Large / Protein 300 mg/dL / Leuk Est Large / Nitrite Negative       /  / Hyaline 85 / Gran  / Sq Epi  / Non Sq Epi 8 / Bacteria Moderate [facility] DIVISION OF KIDNEY DISEASES AND HYPERTENSION   FOLLOW UP NOTE    --------------------------------------------------------------------------------  HPI:  61 y/o male with PMH of CABG, DM, HTN, HLD presenting as transfer from [place] for c/f STEMI. Course c/b gallstone pancreatitis leading to ARDS and shock & cardiac arrest now on [facility] ECMO. Had significant troponin elevation and his LVEF down to 8%. Pt was deemed to be too unstable to have an angiogram and potential PCI due to his co-morbidities & a new subdural bleed. Pt being seen for ERIC. SCr on arrival was 2.15; trended down to hector 1.6 on 11/25 and eventually increased to 3.95 11/28. Pt received IV diuretics as per CTU.    Patient seen & examined today. Pt is intubated, unable to obtain ROS. Pt initiated on CRRT on 12/5/22 to optimize volume status and electrolytes. Pt tolerating CRRT overnight with target net negative fluid balance. Pt underwent decannulation of ECMO on 12/8/22. Pt was restarted overnight on 12/9/22 for volume overload. Pt with clotting of CRRT circuit on 12/14 and was initiated on heparin with CRRT circuit.  Pt tolerating CRRT with no clotting of filter overnight. Pt underwent mitral clip OR today (12/16/22).     PAST HISTORY  --------------------------------------------------------------------------------  No significant changes to PMH, PSH, FHx, SHx, unless otherwise noted    ALLERGIES & MEDICATIONS  --------------------------------------------------------------------------------  Allergies    No Known Allergies    Intolerances      Standing Inpatient Medications  aMIOdarone IVPB 150 milliGRAM(s) IV Intermittent once  aMIOdarone IVPB 150 milliGRAM(s) IV Intermittent once  magnesium sulfate  IVPB 2 Gram(s) IV Intermittent once    PRN Inpatient Medications      REVIEW OF SYSTEMS  --------------------------------------------------------------------------------  Unable to obtain    VITALS/PHYSICAL EXAM  --------------------------------------------------------------------------------  T(C): 37.4 (12-16-22 @ 05:27), Max: 37.4 (12-16-22 @ 05:27)  HR: 114 (12-16-22 @ 10:54) (92 - 114)  BP: 116/32 (12-16-22 @ 05:27) (108/54 - 116/32)  RR: 11 (12-16-22 @ 06:00) (10 - 28)  SpO2: 99% (12-16-22 @ 10:54) (98% - 100%)  Wt(kg): --  Height (cm): 172.7 (12-16-22 @ 05:27)  Weight (kg): 92.9 (12-16-22 @ 05:27)  BMI (kg/m2): 31.1 (12-16-22 @ 05:27)  BSA (m2): 2.06 (12-16-22 @ 05:27)      12-15-22 @ 07:01  -  12-16-22 @ 07:00  --------------------------------------------------------  IN: 2201.7 mL / OUT: 5896 mL / NET: -3694.3 mL    Physical Exam:  	 Gen: ill appearing male intubated   	HEENT: Anicteric  	Pulm: on CMV via ETT+  	CV: S1S2+  	Abd: Soft, +BS       	Ext: LE edema B/L++  	Neuro: awake and nods spontaneously  	Skin: Warm and dry              Dialysis acces: right non tunneled HD catheter    LABS/STUDIES  --------------------------------------------------------------------------------              8.8    12.42 >-----------<  104      [12-16-22 @ 00:24]              28.4     137  |  103  |  19  ----------------------------<  152      [12-16-22 @ 00:23]  4.5   |  22  |  1.41        Ca     8.0     [12-16-22 @ 00:23]      Mg     2.6     [12-16-22 @ 00:23]      Phos  4.3     [12-16-22 @ 00:23]    Creatinine Trend:  SCr 1.41 [12-16 @ 00:23]  SCr 1.63 [12-15 @ 00:46]  SCr 1.43 [12-14 @ 00:34]  SCr 1.38 [12-13 @ 01:08]  SCr 1.55 [12-12 @ 00:41]    Urinalysis - [12-16-22 @ 04:28]      Color DK BROWN / Appearance Turbid / SG 1.029 / pH 6.0      Gluc Trace / Ketone Negative  / Bili Negative / Urobili 6 mg/dL       Blood Large / Protein 300 mg/dL / Leuk Est Large / Nitrite Negative       /  / Hyaline 85 / Gran  / Sq Epi  / Non Sq Epi 8 / Bacteria Moderate [facility] DIVISION OF KIDNEY DISEASES AND HYPERTENSION   FOLLOW UP NOTE    --------------------------------------------------------------------------------  HPI:  61 y/o male with PMH of CABG, DM, HTN, HLD presenting as transfer from [place] for c/f STEMI. Course c/b gallstone pancreatitis leading to ARDS and shock & cardiac arrest now on [facility] ECMO. Had significant troponin elevation and his LVEF down to 8%. Pt was deemed to be too unstable to have an angiogram and potential PCI due to his co-morbidities & a new subdural bleed. Pt being seen for ERIC. SCr on arrival was 2.15; trended down to hector 1.6 on 11/25 and eventually increased to 3.95 11/28. Pt received IV diuretics as per CTU.    Patient seen & examined today. Pt is intubated, unable to obtain ROS. Pt initiated on CRRT on 12/5/22 to optimize volume status and electrolytes. Pt tolerating CRRT overnight with target net negative fluid balance. Pt underwent decannulation of ECMO on 12/8/22. Pt was restarted overnight on 12/9/22 for volume overload. Pt with clotting of CRRT circuit on 12/14 and was initiated on heparin with CRRT circuit.  Pt tolerating CRRT with no clotting of filter overnight. Pt underwent mitral clip OR today (12/16/22).     PAST HISTORY  --------------------------------------------------------------------------------  No significant changes to PMH, PSH, FHx, SHx, unless otherwise noted    ALLERGIES & MEDICATIONS  --------------------------------------------------------------------------------  Allergies    No Known Allergies    Intolerances      Standing Inpatient Medications  aMIOdarone IVPB 150 milliGRAM(s) IV Intermittent once  aMIOdarone IVPB 150 milliGRAM(s) IV Intermittent once  magnesium sulfate  IVPB 2 Gram(s) IV Intermittent once    PRN Inpatient Medications      REVIEW OF SYSTEMS  --------------------------------------------------------------------------------  Unable to obtain    VITALS/PHYSICAL EXAM  --------------------------------------------------------------------------------  T(C): 37.4 (12-16-22 @ 05:27), Max: 37.4 (12-16-22 @ 05:27)  HR: 114 (12-16-22 @ 10:54) (92 - 114)  BP: 116/32 (12-16-22 @ 05:27) (108/54 - 116/32)  RR: 11 (12-16-22 @ 06:00) (10 - 28)  SpO2: 99% (12-16-22 @ 10:54) (98% - 100%)  Wt(kg): --  Height (cm): 172.7 (12-16-22 @ 05:27)  Weight (kg): 92.9 (12-16-22 @ 05:27)  BMI (kg/m2): 31.1 (12-16-22 @ 05:27)  BSA (m2): 2.06 (12-16-22 @ 05:27)      12-15-22 @ 07:01  -  12-16-22 @ 07:00  --------------------------------------------------------  IN: 2201.7 mL / OUT: 5896 mL / NET: -3694.3 mL    Physical Exam:  	 Gen: ill appearing male intubated   	HEENT: Anicteric  	Pulm: on CMV via ETT+  	CV: S1S2+  	Abd: Soft, +BS       	Ext: LE edema B/L++  	Neuro: awake and nods spontaneously  	Skin: Warm and dry              Dialysis acces: right non tunneled HD catheter    LABS/STUDIES  --------------------------------------------------------------------------------              8.8    12.42 >-----------<  104      [12-16-22 @ 00:24]              28.4     137  |  103  |  19  ----------------------------<  152      [12-16-22 @ 00:23]  4.5   |  22  |  1.41        Ca     8.0     [12-16-22 @ 00:23]      Mg     2.6     [12-16-22 @ 00:23]      Phos  4.3     [12-16-22 @ 00:23]    Creatinine Trend:  SCr 1.41 [12-16 @ 00:23]  SCr 1.63 [12-15 @ 00:46]  SCr 1.43 [12-14 @ 00:34]  SCr 1.38 [12-13 @ 01:08]  SCr 1.55 [12-12 @ 00:41]    Urinalysis - [12-16-22 @ 04:28]      Color DK BROWN / Appearance Turbid / SG 1.029 / pH 6.0      Gluc Trace / Ketone Negative  / Bili Negative / Urobili 6 mg/dL       Blood Large / Protein 300 mg/dL / Leuk Est Large / Nitrite Negative       /  / Hyaline 85 / Gran  / Sq Epi  / Non Sq Epi 8 / Bacteria Moderate

## 2022-12-17 DIAGNOSIS — Z93.0 TRACHEOSTOMY STATUS: ICD-10-CM

## 2022-12-17 LAB
ALBUMIN SERPL ELPH-MCNC: 2.7 G/DL — LOW (ref 3.3–5)
ALP SERPL-CCNC: 100 U/L — SIGNIFICANT CHANGE UP (ref 40–120)
ALT FLD-CCNC: 14 U/L — SIGNIFICANT CHANGE UP (ref 10–45)
ANION GAP SERPL CALC-SCNC: 12 MMOL/L — SIGNIFICANT CHANGE UP (ref 5–17)
APTT BLD: 27.6 SEC — SIGNIFICANT CHANGE UP (ref 27.5–35.5)
APTT BLD: 54.7 SEC — HIGH (ref 27.5–35.5)
AST SERPL-CCNC: 36 U/L — SIGNIFICANT CHANGE UP (ref 10–40)
BASE EXCESS BLDV CALC-SCNC: -0.3 MMOL/L — SIGNIFICANT CHANGE UP (ref -2–3)
BASE EXCESS BLDV CALC-SCNC: -0.8 MMOL/L — SIGNIFICANT CHANGE UP (ref -2–3)
BASE EXCESS BLDV CALC-SCNC: -1.3 MMOL/L — SIGNIFICANT CHANGE UP (ref -2–3)
BASE EXCESS BLDV CALC-SCNC: 0.3 MMOL/L — SIGNIFICANT CHANGE UP (ref -2–3)
BILIRUB SERPL-MCNC: 1 MG/DL — SIGNIFICANT CHANGE UP (ref 0.2–1.2)
BUN SERPL-MCNC: 19 MG/DL — SIGNIFICANT CHANGE UP (ref 7–23)
CALCIUM SERPL-MCNC: 8.5 MG/DL — SIGNIFICANT CHANGE UP (ref 8.4–10.5)
CHLORIDE SERPL-SCNC: 102 MMOL/L — SIGNIFICANT CHANGE UP (ref 96–108)
CO2 BLDV-SCNC: 25 MMOL/L — SIGNIFICANT CHANGE UP (ref 22–26)
CO2 BLDV-SCNC: 26 MMOL/L — SIGNIFICANT CHANGE UP (ref 22–26)
CO2 BLDV-SCNC: 27 MMOL/L — HIGH (ref 22–26)
CO2 SERPL-SCNC: 22 MMOL/L — SIGNIFICANT CHANGE UP (ref 22–31)
CREAT SERPL-MCNC: 1.41 MG/DL — HIGH (ref 0.5–1.3)
CULTURE RESULTS: SIGNIFICANT CHANGE UP
EGFR: 56 ML/MIN/1.73M2 — LOW
FIBRINOGEN PPP-MCNC: 385 MG/DL — SIGNIFICANT CHANGE UP (ref 200–445)
GAS PNL BLDA: SIGNIFICANT CHANGE UP
GAS PNL BLDV: SIGNIFICANT CHANGE UP
GLUCOSE BLDC GLUCOMTR-MCNC: 106 MG/DL — HIGH (ref 70–99)
GLUCOSE BLDC GLUCOMTR-MCNC: 120 MG/DL — HIGH (ref 70–99)
GLUCOSE BLDC GLUCOMTR-MCNC: 121 MG/DL — HIGH (ref 70–99)
GLUCOSE BLDC GLUCOMTR-MCNC: 124 MG/DL — HIGH (ref 70–99)
GLUCOSE BLDC GLUCOMTR-MCNC: 132 MG/DL — HIGH (ref 70–99)
GLUCOSE BLDC GLUCOMTR-MCNC: 140 MG/DL — HIGH (ref 70–99)
GLUCOSE BLDC GLUCOMTR-MCNC: 148 MG/DL — HIGH (ref 70–99)
GLUCOSE BLDC GLUCOMTR-MCNC: 152 MG/DL — HIGH (ref 70–99)
GLUCOSE BLDC GLUCOMTR-MCNC: 177 MG/DL — HIGH (ref 70–99)
GLUCOSE BLDC GLUCOMTR-MCNC: 178 MG/DL — HIGH (ref 70–99)
GLUCOSE BLDC GLUCOMTR-MCNC: 193 MG/DL — HIGH (ref 70–99)
GLUCOSE BLDC GLUCOMTR-MCNC: 206 MG/DL — HIGH (ref 70–99)
GLUCOSE SERPL-MCNC: 121 MG/DL — HIGH (ref 70–99)
HCO3 BLDV-SCNC: 24 MMOL/L — SIGNIFICANT CHANGE UP (ref 22–29)
HCO3 BLDV-SCNC: 25 MMOL/L — SIGNIFICANT CHANGE UP (ref 22–29)
HCT VFR BLD CALC: 27 % — LOW (ref 39–50)
HCT VFR BLD CALC: 28.3 % — LOW (ref 39–50)
HGB BLD-MCNC: 8.2 G/DL — LOW (ref 13–17)
HGB BLD-MCNC: 8.7 G/DL — LOW (ref 13–17)
HOROWITZ INDEX BLDV+IHG-RTO: 50 — SIGNIFICANT CHANGE UP
INR BLD: 1.01 RATIO — SIGNIFICANT CHANGE UP (ref 0.88–1.16)
MAGNESIUM SERPL-MCNC: 2.4 MG/DL — SIGNIFICANT CHANGE UP (ref 1.6–2.6)
MCHC RBC-ENTMCNC: 28.7 PG — SIGNIFICANT CHANGE UP (ref 27–34)
MCHC RBC-ENTMCNC: 28.8 PG — SIGNIFICANT CHANGE UP (ref 27–34)
MCHC RBC-ENTMCNC: 30.4 GM/DL — LOW (ref 32–36)
MCHC RBC-ENTMCNC: 30.7 GM/DL — LOW (ref 32–36)
MCV RBC AUTO: 93.7 FL — SIGNIFICANT CHANGE UP (ref 80–100)
MCV RBC AUTO: 94.4 FL — SIGNIFICANT CHANGE UP (ref 80–100)
NRBC # BLD: 0 /100 WBCS — SIGNIFICANT CHANGE UP (ref 0–0)
PCO2 BLDV: 38 MMHG — LOW (ref 42–55)
PCO2 BLDV: 40 MMHG — LOW (ref 42–55)
PCO2 BLDV: 41 MMHG — LOW (ref 42–55)
PCO2 BLDV: 42 MMHG — SIGNIFICANT CHANGE UP (ref 42–55)
PH BLDV: 7.38 — SIGNIFICANT CHANGE UP (ref 7.32–7.43)
PH BLDV: 7.39 — SIGNIFICANT CHANGE UP (ref 7.32–7.43)
PH BLDV: 7.41 — SIGNIFICANT CHANGE UP (ref 7.32–7.43)
PHOSPHATE SERPL-MCNC: 3.5 MG/DL — SIGNIFICANT CHANGE UP (ref 2.5–4.5)
PLATELET # BLD AUTO: 77 K/UL — LOW (ref 150–400)
PLATELET # BLD AUTO: 91 K/UL — LOW (ref 150–400)
PO2 BLDV: 42 MMHG — SIGNIFICANT CHANGE UP (ref 25–45)
PO2 BLDV: 43 MMHG — SIGNIFICANT CHANGE UP (ref 25–45)
PO2 BLDV: 44 MMHG — SIGNIFICANT CHANGE UP (ref 25–45)
PO2 BLDV: 48 MMHG — HIGH (ref 25–45)
POTASSIUM SERPL-MCNC: 3.7 MMOL/L — SIGNIFICANT CHANGE UP (ref 3.5–5.3)
POTASSIUM SERPL-SCNC: 3.7 MMOL/L — SIGNIFICANT CHANGE UP (ref 3.5–5.3)
PROT SERPL-MCNC: 6.2 G/DL — SIGNIFICANT CHANGE UP (ref 6–8.3)
PROTHROM AB SERPL-ACNC: 11.6 SEC — SIGNIFICANT CHANGE UP (ref 10.5–13.4)
RBC # BLD: 2.86 M/UL — LOW (ref 4.2–5.8)
RBC # BLD: 3.02 M/UL — LOW (ref 4.2–5.8)
RBC # FLD: 18 % — HIGH (ref 10.3–14.5)
RBC # FLD: 18.2 % — HIGH (ref 10.3–14.5)
SAO2 % BLDV: 72.7 % — SIGNIFICANT CHANGE UP (ref 67–88)
SAO2 % BLDV: 74.6 % — SIGNIFICANT CHANGE UP (ref 67–88)
SAO2 % BLDV: 77.7 % — SIGNIFICANT CHANGE UP (ref 67–88)
SAO2 % BLDV: 81.2 % — SIGNIFICANT CHANGE UP (ref 67–88)
SODIUM SERPL-SCNC: 136 MMOL/L — SIGNIFICANT CHANGE UP (ref 135–145)
SPECIMEN SOURCE: SIGNIFICANT CHANGE UP
WBC # BLD: 11.33 K/UL — HIGH (ref 3.8–10.5)
WBC # BLD: 11.92 K/UL — HIGH (ref 3.8–10.5)
WBC # FLD AUTO: 11.33 K/UL — HIGH (ref 3.8–10.5)
WBC # FLD AUTO: 11.92 K/UL — HIGH (ref 3.8–10.5)

## 2022-12-17 PROCEDURE — 99233 SBSQ HOSP IP/OBS HIGH 50: CPT

## 2022-12-17 PROCEDURE — 99024 POSTOP FOLLOW-UP VISIT: CPT

## 2022-12-17 PROCEDURE — 93010 ELECTROCARDIOGRAM REPORT: CPT

## 2022-12-17 PROCEDURE — 99291 CRITICAL CARE FIRST HOUR: CPT

## 2022-12-17 PROCEDURE — 99292 CRITICAL CARE ADDL 30 MIN: CPT

## 2022-12-17 PROCEDURE — 99233 SBSQ HOSP IP/OBS HIGH 50: CPT | Mod: GC

## 2022-12-17 PROCEDURE — 71045 X-RAY EXAM CHEST 1 VIEW: CPT | Mod: 26

## 2022-12-17 RX ORDER — POTASSIUM CHLORIDE 20 MEQ
10 PACKET (EA) ORAL
Refills: 0 | Status: COMPLETED | OUTPATIENT
Start: 2022-12-17 | End: 2022-12-17

## 2022-12-17 RX ORDER — POTASSIUM CHLORIDE 20 MEQ
10 PACKET (EA) ORAL ONCE
Refills: 0 | Status: COMPLETED | OUTPATIENT
Start: 2022-12-17 | End: 2022-12-17

## 2022-12-17 RX ORDER — ASPIRIN/CALCIUM CARB/MAGNESIUM 324 MG
81 TABLET ORAL DAILY
Refills: 0 | Status: DISCONTINUED | OUTPATIENT
Start: 2022-12-18 | End: 2023-01-20

## 2022-12-17 RX ORDER — HEPARIN SODIUM 5000 [USP'U]/ML
5000 INJECTION INTRAVENOUS; SUBCUTANEOUS EVERY 8 HOURS
Refills: 0 | Status: DISCONTINUED | OUTPATIENT
Start: 2022-12-17 | End: 2023-01-19

## 2022-12-17 RX ORDER — HEPARIN SODIUM 5000 [USP'U]/ML
300 INJECTION INTRAVENOUS; SUBCUTANEOUS
Qty: 10000 | Refills: 0 | Status: DISCONTINUED | OUTPATIENT
Start: 2022-12-17 | End: 2022-12-18

## 2022-12-17 RX ORDER — HEPARIN SODIUM 5000 [USP'U]/ML
300 INJECTION INTRAVENOUS; SUBCUTANEOUS
Qty: 10000 | Refills: 0 | Status: DISCONTINUED | OUTPATIENT
Start: 2022-12-17 | End: 2022-12-17

## 2022-12-17 RX ORDER — HEPARIN SODIUM 5000 [USP'U]/ML
1000 INJECTION INTRAVENOUS; SUBCUTANEOUS
Qty: 25000 | Refills: 0 | Status: DISCONTINUED | OUTPATIENT
Start: 2022-12-17 | End: 2022-12-17

## 2022-12-17 RX ORDER — ALBUMIN HUMAN 25 %
50 VIAL (ML) INTRAVENOUS
Refills: 0 | Status: COMPLETED | OUTPATIENT
Start: 2022-12-17 | End: 2022-12-17

## 2022-12-17 RX ORDER — HYDROMORPHONE HYDROCHLORIDE 2 MG/ML
0.5 INJECTION INTRAMUSCULAR; INTRAVENOUS; SUBCUTANEOUS ONCE
Refills: 0 | Status: DISCONTINUED | OUTPATIENT
Start: 2022-12-17 | End: 2022-12-17

## 2022-12-17 RX ADMIN — Medication 100 MILLILITER(S): at 17:05

## 2022-12-17 RX ADMIN — PANTOPRAZOLE SODIUM 40 MILLIGRAM(S): 20 TABLET, DELAYED RELEASE ORAL at 12:51

## 2022-12-17 RX ADMIN — CHLORHEXIDINE GLUCONATE 15 MILLILITER(S): 213 SOLUTION TOPICAL at 05:35

## 2022-12-17 RX ADMIN — HYDROMORPHONE HYDROCHLORIDE 0.5 MILLIGRAM(S): 2 INJECTION INTRAMUSCULAR; INTRAVENOUS; SUBCUTANEOUS at 10:45

## 2022-12-17 RX ADMIN — CHLORHEXIDINE GLUCONATE 15 MILLILITER(S): 213 SOLUTION TOPICAL at 18:49

## 2022-12-17 RX ADMIN — Medication 15 MILLILITER(S): at 12:51

## 2022-12-17 RX ADMIN — Medication 3 MILLILITER(S): at 23:20

## 2022-12-17 RX ADMIN — AMIODARONE HYDROCHLORIDE 200 MILLIGRAM(S): 400 TABLET ORAL at 05:35

## 2022-12-17 RX ADMIN — Medication 100 MILLILITER(S): at 16:45

## 2022-12-17 RX ADMIN — Medication 3 MILLILITER(S): at 11:17

## 2022-12-17 RX ADMIN — Medication 3 MILLILITER(S): at 05:09

## 2022-12-17 RX ADMIN — Medication 50 MILLIEQUIVALENT(S): at 11:50

## 2022-12-17 RX ADMIN — HEPARIN SODIUM 10 UNIT(S)/HR: 5000 INJECTION INTRAVENOUS; SUBCUTANEOUS at 01:29

## 2022-12-17 RX ADMIN — Medication 50 MILLIEQUIVALENT(S): at 01:30

## 2022-12-17 RX ADMIN — Medication 50 MILLIEQUIVALENT(S): at 00:00

## 2022-12-17 RX ADMIN — Medication 100 MILLIGRAM(S): at 12:51

## 2022-12-17 RX ADMIN — HYDROMORPHONE HYDROCHLORIDE 0.5 MILLIGRAM(S): 2 INJECTION INTRAMUSCULAR; INTRAVENOUS; SUBCUTANEOUS at 10:30

## 2022-12-17 RX ADMIN — HEPARIN SODIUM 5000 UNIT(S): 5000 INJECTION INTRAVENOUS; SUBCUTANEOUS at 18:25

## 2022-12-17 RX ADMIN — Medication 3 MILLILITER(S): at 17:16

## 2022-12-17 RX ADMIN — ATORVASTATIN CALCIUM 40 MILLIGRAM(S): 80 TABLET, FILM COATED ORAL at 22:42

## 2022-12-17 NOTE — PROGRESS NOTE ADULT - ASSESSMENT
61 YO M now S/P #7 Proximal XLT Tracheostomy POD # 1. In inferior and superior surgicel around stoma removed. Doing well on the ventilator.

## 2022-12-17 NOTE — PROGRESS NOTE ADULT - PROBLEM SELECTOR PLAN 1
- ECMO decannulated 12/08 after successful wean  - Had severe MR post ECMO removal which prevented appropriate weaning of IABP. Underwent mitral clip on 12/16 with reduction of MR to mild/moderate  - Plan for IABP removal today  - c/w milrinone will wean as tolerated after IABP removal  - will start GDMT once patient is off IABP and milrinone support

## 2022-12-17 NOTE — PROGRESS NOTE ADULT - PROBLEM SELECTOR PLAN 3
- CT abd showing acute pancreatitis  - RUQ showing sludge, no stones  - lipase > 3000 11/24, normalized prior and now fluctuating  - conservative care  - appreciate GI recs, no intervention planned   - surgery following, no current interventions at this time

## 2022-12-17 NOTE — PROGRESS NOTE ADULT - ATTENDING COMMENTS
Pt well known to me.  ECMO, CRRT now successfully undergoing UF  1.  ARF--CRRT orders reviewed with fellow, critical care RN.  K_, PO4 optimized with current regimen  2.  CHF--UF, milrinone vs other inotropic vasodilator rx. Appreciate heart failure team inputand will optimize volume removal as tolerated given valvular and structural cardiac dysfunction    discussed with CTU team  Bolivar Morrow MD  contact me on TEAMS

## 2022-12-17 NOTE — PROGRESS NOTE ADULT - ACP NAME
no scribe
Kylah Chamorro NP
Kylah Treadwell
Abhijit Ngo
Kylah Chamorro Np
Galileo COX
Fredo COX
Betty Catalan

## 2022-12-17 NOTE — PROGRESS NOTE ADULT - SUBJECTIVE AND OBJECTIVE BOX
Interval History: no acute events    Medications:  albuterol/ipratropium for Nebulization 3 milliLiter(s) Nebulizer every 6 hours  aMIOdarone    Tablet 200 milliGRAM(s) Oral daily  atorvastatin 40 milliGRAM(s) Oral at bedtime  chlorhexidine 0.12% Liquid 15 milliLiter(s) Oral Mucosa every 12 hours  CRRT Treatment    <Continuous>  dexMEDEtomidine Infusion 0.4 MICROgram(s)/kG/Hr IV Continuous <Continuous>  heparin  Infusion Syringe 300 Unit(s)/Hr CRRT <Continuous>  HYDROmorphone  Injectable 0.5 milliGRAM(s) IV Push once  insulin regular Infusion 3 Unit(s)/Hr IV Continuous <Continuous>  milrinone Infusion 0.1 MICROgram(s)/kG/Min IV Continuous <Continuous>  multivitamin/minerals/iron Oral Solution (CENTRUM) 15 milliLiter(s) Oral daily  norepinephrine Infusion 0.05 MICROgram(s)/kG/Min IV Continuous <Continuous>  pantoprazole  Injectable 40 milliGRAM(s) IV Push daily  Phoxillum Filtration BK 4 / 2.5 5000 milliLiter(s) CRRT <Continuous>  Phoxillum Filtration BK 4 / 2.5 5000 milliLiter(s) CRRT <Continuous>  PrismaSOL Filtration BGK 4 / 2.5 5000 milliLiter(s) CRRT <Continuous>  propofol Infusion 20 MICROgram(s)/kG/Min IV Continuous <Continuous>  thiamine 100 milliGRAM(s) Enteral Tube daily  vasopressin Infusion 0.02 Unit(s)/Min IV Continuous <Continuous>      Vitals:  T(C): 37.3 (12-17-22 @ 08:00), Max: 37.3 (12-17-22 @ 08:00)  HR: 74 (12-17-22 @ 13:00) (74 - 120)  BP: --  BP(mean): 76 (12-16-22 @ 16:25) (76 - 76)  RR: 12 (12-17-22 @ 13:00) (12 - 38)  SpO2: 100% (12-17-22 @ 13:00) (91% - 100%)    Daily Height in cm: 172.72 (16 Dec 2022 16:25)    Daily     Weight (kg): 92.9 (12-16 @ 16:25)    I&O's Summary    16 Dec 2022 07:01  -  17 Dec 2022 07:00  --------------------------------------------------------  IN: 1756.3 mL / OUT: 1468 mL / NET: 288.3 mL    17 Dec 2022 07:01  -  17 Dec 2022 13:54  --------------------------------------------------------  IN: 844.3 mL / OUT: 885 mL / NET: -40.7 mL        Physical Exam:  Appearance: No Acute Distress  HEENT: PERRL  Neck: No JVD  Cardiovascular: Normal S1 S2, No murmurs/rubs/gallops  Respiratory: Clear to auscultation bilaterally  Gastrointestinal: Soft, Non-tender	  Skin: No cyanosis	  Neurologic: Non-focal  Extremities: No LE edema  Psychiatry: A & O x 3, Mood & affect appropriate    Labs:                        8.2    11.33 )-----------( 77       ( 17 Dec 2022 00:12 )             27.0     12-17    136  |  102  |  19  ----------------------------<  121<H>  3.7   |  22  |  1.41<H>    Ca    8.5      17 Dec 2022 00:12  Phos  3.5     12-17  Mg     2.4     12-17    TPro  6.2  /  Alb  2.7<L>  /  TBili  1.0  /  DBili  x   /  AST  36  /  ALT  14  /  AlkPhos  100  12-17    PT/INR - ( 17 Dec 2022 00:12 )   PT: 11.6 sec;   INR: 1.01 ratio         PTT - ( 17 Dec 2022 08:20 )  PTT:54.7 sec  CARDIAC MARKERS ( 16 Dec 2022 10:55 )  x     / x     / 27 U/L / x     / 1.6 ng/mL

## 2022-12-17 NOTE — PROGRESS NOTE ADULT - SUBJECTIVE AND OBJECTIVE BOX
CRITICAL CARE ATTENDING - CTICU    MEDICATIONS  (STANDING):  albuterol/ipratropium for Nebulization 3 milliLiter(s) Nebulizer every 6 hours  aMIOdarone    Tablet 200 milliGRAM(s) Oral daily  atorvastatin 40 milliGRAM(s) Oral at bedtime  chlorhexidine 0.12% Liquid 15 milliLiter(s) Oral Mucosa every 12 hours  CRRT Treatment    <Continuous>  dexMEDEtomidine Infusion 0.4 MICROgram(s)/kG/Hr (9.29 mL/Hr) IV Continuous <Continuous>  heparin  Infusion 1000 Unit(s)/Hr (10 mL/Hr) IV Continuous <Continuous>  heparin  Infusion Syringe 300 Unit(s)/Hr (0.6 mL/Hr) CRRT <Continuous>  insulin regular Infusion 3 Unit(s)/Hr (3 mL/Hr) IV Continuous <Continuous>  milrinone Infusion 0.1 MICROgram(s)/kG/Min (2.79 mL/Hr) IV Continuous <Continuous>  multivitamin/minerals/iron Oral Solution (CENTRUM) 15 milliLiter(s) Oral daily  norepinephrine Infusion 0.05 MICROgram(s)/kG/Min (8.71 mL/Hr) IV Continuous <Continuous>  pantoprazole  Injectable 40 milliGRAM(s) IV Push daily  PrismaSATE Dialysate BK 0 / 3.5 5000 milliLiter(s) (2400 mL/Hr) CRRT <Continuous>  PrismaSOL Filtration BGK 0 / 2.5 5000 milliLiter(s) (800 mL/Hr) CRRT <Continuous>  PrismaSOL Filtration BGK 4 / 2.5 5000 milliLiter(s) (200 mL/Hr) CRRT <Continuous>  propofol Infusion 20 MICROgram(s)/kG/Min (11.1 mL/Hr) IV Continuous <Continuous>  thiamine 100 milliGRAM(s) Enteral Tube daily  vasopressin Infusion 0.02 Unit(s)/Min (3 mL/Hr) IV Continuous <Continuous>                                    8.2    11.33 )-----------( 77       ( 17 Dec 2022 00:12 )             27.0       12-17    136  |  102  |  19  ----------------------------<  121<H>  3.7   |  22  |  1.41<H>    Ca    8.5      17 Dec 2022 00:12  Phos  3.5     12-17  Mg     2.4     12-17    TPro  6.2  /  Alb  2.7<L>  /  TBili  1.0  /  DBili  x   /  AST  36  /  ALT  14  /  AlkPhos  100  12-17      PT/INR - ( 17 Dec 2022 00:12 )   PT: 11.6 sec;   INR: 1.01 ratio         PTT - ( 17 Dec 2022 00:12 )  PTT:27.6 sec    Mode: AC/ CMV (Assist Control/ Continuous Mandatory Ventilation)  RR (machine): 12  TV (machine): 600  FiO2: 50  PEEP: 5  ITime: 1  MAP: 17  PIP: 23      Daily Height in cm: 172.72 (16 Dec 2022 16:25)    Daily       12-16 @ 07:01  -  12-17 @ 07:00  --------------------------------------------------------  IN: 1756.3 mL / OUT: 1253 mL / NET: 503.3 mL        Critically Ill patient  : [ ] preoperative ,   [ ] post operative  [x] Non Operative    Requires :  [x ] Arterial Line   [ x] Central Line  [ ] PA catheter  [x] IABP  [ ] ECMO  [ ] LVAD  [x ] Ventilator  [ ] pacemaker [ ] Impella.                      [x ] ABG's     [x ] Pulse Oxymetry Monitoring  Bedside evaluation , monitoring , treatment of hemodynamics , fluids , IVP/ IVCD meds.        Diagnosis:     [facility]25 [facility]     ECMO Decannulation 12/8     POD 12/6- IABP placed in cath lab     POD 1 Mitral Clip x1, TRACH 7 Merari XLT prox with ENT    MI     Acute Pancreatitis     ARDS     Hemodynamic lability,  instability. Requires IVCD [ ] vasopressors [x] inotropes  [ ] vasodilator  [x]IVSS fluid  to maintain MAP, perfusion, C.I.     Post Arrest Shock state    Respiratory Failure     Requires chest PT, pulmonary toilet, ambu bagging, suctioning to maintain SaO2,  patent airway and treat atelectasis.     Ventilator Management:  [x ]AC-rest    [ x]CPAP-PS Wean    [ ]Trach Collar     [ ]Extubate    [ ] T-Piece  [ ]peep>5     Difficult weaning process - multiple organ system involvement in critically ill patient     Sedated with IVCD Propofol for vent synchrony    CHF- acute [ x]   chronic [  ]    systolic [ x]   diatolic [ ]          - Echo- EF -   30-40% Severe segmental LV systolic dysfunction          [ ] RV dysfunction     - Cxr-cardiomegally, edema          - Clinical-  [x ]inotropes   [ ]pressors [ ]diuresis   [x]IABP      [ ]LVAD   [x]Respiratory Failure.     Cardiogenic Shock - resolving    CVVHD     Thrombocytopenia     Hypotension     IVCD anticoagulation with [x] Heparin  [ ] Argatroban    IABP   [x] management   [ x] wean 1:1 1:2 1:3   [ ] removal and f/u vascular checks     DM- IVCD Insulin    Requires bedside physical therapy, mobilization and total snf care.     Tolerates NG / NJ feeds at [x] goal rate - currently off [ ] trophic rate    [ ]       rate         By signing my name below, I, Maria D'Amico, attest that this documentation has been prepared under the direction and in the presence of Finn Zelaya MD.   Electronically Signed: Maria D'Amico, Scribe 12-17-22 @ 07:39      Discussed with CT surgeon, Physician Assistant - Nurse Practitioner- Critical care medicine team.   Discussed at  AM / PM rounds.   Chart, labs , films reviewed.    Cumulative Critical Care Time Given Today:  CRITICAL CARE ATTENDING - CTICU    MEDICATIONS  (STANDING):  albuterol/ipratropium for Nebulization 3 milliLiter(s) Nebulizer every 6 hours  aMIOdarone    Tablet 200 milliGRAM(s) Oral daily  atorvastatin 40 milliGRAM(s) Oral at bedtime  chlorhexidine 0.12% Liquid 15 milliLiter(s) Oral Mucosa every 12 hours  CRRT Treatment    <Continuous>  dexMEDEtomidine Infusion 0.4 MICROgram(s)/kG/Hr (9.29 mL/Hr) IV Continuous <Continuous>  heparin  Infusion 1000 Unit(s)/Hr (10 mL/Hr) IV Continuous <Continuous>  heparin  Infusion Syringe 300 Unit(s)/Hr (0.6 mL/Hr) CRRT <Continuous>  insulin regular Infusion 3 Unit(s)/Hr (3 mL/Hr) IV Continuous <Continuous>  milrinone Infusion 0.1 MICROgram(s)/kG/Min (2.79 mL/Hr) IV Continuous <Continuous>  multivitamin/minerals/iron Oral Solution (CENTRUM) 15 milliLiter(s) Oral daily  norepinephrine Infusion 0.05 MICROgram(s)/kG/Min (8.71 mL/Hr) IV Continuous <Continuous>  pantoprazole  Injectable 40 milliGRAM(s) IV Push daily  PrismaSATE Dialysate BK 0 / 3.5 5000 milliLiter(s) (2400 mL/Hr) CRRT <Continuous>  PrismaSOL Filtration BGK 0 / 2.5 5000 milliLiter(s) (800 mL/Hr) CRRT <Continuous>  PrismaSOL Filtration BGK 4 / 2.5 5000 milliLiter(s) (200 mL/Hr) CRRT <Continuous>  propofol Infusion 20 MICROgram(s)/kG/Min (11.1 mL/Hr) IV Continuous <Continuous>  thiamine 100 milliGRAM(s) Enteral Tube daily  vasopressin Infusion 0.02 Unit(s)/Min (3 mL/Hr) IV Continuous <Continuous>                                    8.2    11.33 )-----------( 77       ( 17 Dec 2022 00:12 )             27.0       12-17    136  |  102  |  19  ----------------------------<  121<H>  3.7   |  22  |  1.41<H>    Ca    8.5      17 Dec 2022 00:12  Phos  3.5     12-17  Mg     2.4     12-17    TPro  6.2  /  Alb  2.7<L>  /  TBili  1.0  /  DBili  x   /  AST  36  /  ALT  14  /  AlkPhos  100  12-17      PT/INR - ( 17 Dec 2022 00:12 )   PT: 11.6 sec;   INR: 1.01 ratio         PTT - ( 17 Dec 2022 00:12 )  PTT:27.6 sec    Mode: AC/ CMV (Assist Control/ Continuous Mandatory Ventilation)  RR (machine): 12  TV (machine): 600  FiO2: 50  PEEP: 5  ITime: 1  MAP: 17  PIP: 23      Daily Height in cm: 172.72 (16 Dec 2022 16:25)    Daily       12-16 @ 07:01  -  12-17 @ 07:00  --------------------------------------------------------  IN: 1756.3 mL / OUT: 1253 mL / NET: 503.3 mL        Critically Ill patient  : [ ] preoperative ,   [ ] post operative  [x] Non Operative    Requires :  [x ] Arterial Line   [ x] Central Line  [ ] PA catheter  [x] IABP  [ ] ECMO  [ ] LVAD  [x ] Ventilator  [ ] pacemaker [ ] Impella.                      [x ] ABG's     [x ] Pulse Oxymetry Monitoring  Bedside evaluation , monitoring , treatment of hemodynamics , fluids , IVP/ IVCD meds.        Diagnosis:     [facility]25 [facility] in [facility]     ECMO Decannulation 12/8     POD 12/6- IABP placed in cath lab     POD 1 Mitral Clip x1, TRACH 7 Merari XLT prox with ENT    MI     Acute Pancreatitis     ARDS     Hemodynamic lability,  instability. Requires IVCD [ ] vasopressors [x] inotropes  [ ] vasodilator  [x]IVSS fluid  to maintain MAP, perfusion, C.I.     Post Arrest Shock state    Respiratory Failure     Requires chest PT, pulmonary toilet, ambu bagging, suctioning to maintain SaO2,  patent airway and treat atelectasis.     Ventilator Management:  [x ]AC-rest    [ x]CPAP-PS Wean    [ ]Trach Collar     [ ]Extubate    [ ] T-Piece  [ ]peep>5     Difficult weaning process - multiple organ system involvement in critically ill patient     Sedated with IVCD Propofol for vent synchrony    CHF- acute [ x]   chronic [  ]    systolic [ x]   diatolic [ ]          - Echo- EF -   30-40% Severe segmental LV systolic dysfunction          [ ] RV dysfunction     - Cxr-cardiomegally, edema          - Clinical-  [x ]inotropes   [ ]pressors [ ]diuresis   [x]IABP      [ ]LVAD   [x]Respiratory Failure.     Cardiogenic Shock - resolving    CVVHD     Thrombocytopenia     Hypotension     IVCD anticoagulation with [x] Heparin  [ ] Argatroban    IABP   [x] management   [ x] wean 1:1 1:2 1:3   [ ] removal and f/u vascular checks     DM- IVCD Insulin    Requires bedside physical therapy, mobilization and total care home care.     Tolerates NG / NJ feeds at [x] goal rate - currently off [ ] trophic rate    [ ]       rate         By signing my name below, I, Maria D'Amico, attest that this documentation has been prepared under the direction and in the presence of Finn Zelaya MD.   Electronically Signed: Maria D'Amico, Scribe 12-17-22 @ 07:39      Discussed with CT surgeon, Physician Assistant - Nurse Practitioner- Critical care medicine team.   Discussed at  AM / PM rounds.   Chart, labs , films reviewed.    Cumulative Critical Care Time Given Today:  CRITICAL CARE ATTENDING - CTICU    MEDICATIONS  (STANDING):  albuterol/ipratropium for Nebulization 3 milliLiter(s) Nebulizer every 6 hours  aMIOdarone    Tablet 200 milliGRAM(s) Oral daily  atorvastatin 40 milliGRAM(s) Oral at bedtime  chlorhexidine 0.12% Liquid 15 milliLiter(s) Oral Mucosa every 12 hours  CRRT Treatment    <Continuous>  dexMEDEtomidine Infusion 0.4 MICROgram(s)/kG/Hr (9.29 mL/Hr) IV Continuous <Continuous>  heparin  Infusion 1000 Unit(s)/Hr (10 mL/Hr) IV Continuous <Continuous>  heparin  Infusion Syringe 300 Unit(s)/Hr (0.6 mL/Hr) CRRT <Continuous>  insulin regular Infusion 3 Unit(s)/Hr (3 mL/Hr) IV Continuous <Continuous>  milrinone Infusion 0.1 MICROgram(s)/kG/Min (2.79 mL/Hr) IV Continuous <Continuous>  multivitamin/minerals/iron Oral Solution (CENTRUM) 15 milliLiter(s) Oral daily  norepinephrine Infusion 0.05 MICROgram(s)/kG/Min (8.71 mL/Hr) IV Continuous <Continuous>  pantoprazole  Injectable 40 milliGRAM(s) IV Push daily  PrismaSATE Dialysate BK 0 / 3.5 5000 milliLiter(s) (2400 mL/Hr) CRRT <Continuous>  PrismaSOL Filtration BGK 0 / 2.5 5000 milliLiter(s) (800 mL/Hr) CRRT <Continuous>  PrismaSOL Filtration BGK 4 / 2.5 5000 milliLiter(s) (200 mL/Hr) CRRT <Continuous>  propofol Infusion 20 MICROgram(s)/kG/Min (11.1 mL/Hr) IV Continuous <Continuous>  thiamine 100 milliGRAM(s) Enteral Tube daily  vasopressin Infusion 0.02 Unit(s)/Min (3 mL/Hr) IV Continuous <Continuous>                                    8.2    11.33 )-----------( 77       ( 17 Dec 2022 00:12 )             27.0       12-17    136  |  102  |  19  ----------------------------<  121<H>  3.7   |  22  |  1.41<H>    Ca    8.5      17 Dec 2022 00:12  Phos  3.5     12-17  Mg     2.4     12-17    TPro  6.2  /  Alb  2.7<L>  /  TBili  1.0  /  DBili  x   /  AST  36  /  ALT  14  /  AlkPhos  100  12-17      PT/INR - ( 17 Dec 2022 00:12 )   PT: 11.6 sec;   INR: 1.01 ratio         PTT - ( 17 Dec 2022 00:12 )  PTT:27.6 sec    Mode: AC/ CMV (Assist Control/ Continuous Mandatory Ventilation)  RR (machine): 12  TV (machine): 600  FiO2: 50  PEEP: 5  ITime: 1  MAP: 17  PIP: 23      Daily Height in cm: 172.72 (16 Dec 2022 16:25)    Daily       12-16 @ 07:01  -  12-17 @ 07:00  --------------------------------------------------------  IN: 1756.3 mL / OUT: 1253 mL / NET: 503.3 mL        Critically Ill patient  : [ ] preoperative ,   [ ] post operative  [x] Non Operative    Requires :  [x ] Arterial Line   [ x] Central Line  [ ] PA catheter  [x] IABP  [ ] ECMO  [ ] LVAD  [x ] Ventilator  [ ] pacemaker [ ] Impella.                      [x ] ABG's     [x ] Pulse Oxymetry Monitoring  Bedside evaluation , monitoring , treatment of hemodynamics , fluids , IVP/ IVCD meds.        Diagnosis:     [facility]25 [facility] in [facility]     ECMO Decannulation 12/8     POD 12/6- IABP placed in cath lab     POD 1 Mitral Clip x1, TRACH 7 Merari XLT prox with ENT    MI     Acute Pancreatitis     ARDS     Hemodynamic lability,  instability. Requires IVCD [ ] vasopressors [x] inotropes  [ ] vasodilator  [x]IVSS fluid  to maintain MAP, perfusion, C.I.     Post Arrest Shock state    Respiratory Failure     Requires chest PT, pulmonary toilet, ambu bagging, suctioning to maintain SaO2,  patent airway and treat atelectasis.     Ventilator Management:  [x ]AC-rest    [ x]CPAP-PS Wean    [ ]Trach Collar     [ ]Extubate    [ ] T-Piece  [ ]peep>5     Difficult weaning process - multiple organ system involvement in critically ill patient     Sedated with IVCD Propofol for vent synchrony    CHF- acute [ x]   chronic [  ]    systolic [ x]   diatolic [ ]          - Echo- EF -   30-40% Severe segmental LV systolic dysfunction          [ ] RV dysfunction     - Cxr-cardiomegally, edema          - Clinical-  [x ]inotropes   [ ]pressors [ ]diuresis   [x]IABP      [ ]LVAD   [x]Respiratory Failure.     Cardiogenic Shock - resolving    CVVHD     Thrombocytopenia     Hypotension     IVCD anticoagulation with [x] Heparin  [ ] Argatroban    IABP   [x] management   [ x] wean 1:1 1:2 1:3   [ ] removal and f/u vascular checks     DM- IVCD Insulin    Requires bedside physical therapy, mobilization and total shelter care.     Tolerates NG / NJ feeds at [x] goal rate - currently off [ ] trophic rate    [ ]       rate         By signing my name below, I, Maria D'Amico, attest that this documentation has been prepared under the direction and in the presence of Finn Zelaya MD.   Electronically Signed: Maria D'Amico, Scribe 12-17-22 @ 07:39      Discussed with CT surgeon, Physician Assistant - Nurse Practitioner- Critical care medicine team.   Discussed at  AM / PM rounds.   Chart, labs , films reviewed.    Cumulative Critical Care Time Given Today:  CRITICAL CARE ATTENDING - CTICU    MEDICATIONS  (STANDING):  albuterol/ipratropium for Nebulization 3 milliLiter(s) Nebulizer every 6 hours  aMIOdarone    Tablet 200 milliGRAM(s) Oral daily  atorvastatin 40 milliGRAM(s) Oral at bedtime  chlorhexidine 0.12% Liquid 15 milliLiter(s) Oral Mucosa every 12 hours  CRRT Treatment    <Continuous>  dexMEDEtomidine Infusion 0.4 MICROgram(s)/kG/Hr (9.29 mL/Hr) IV Continuous <Continuous>  heparin  Infusion 1000 Unit(s)/Hr (10 mL/Hr) IV Continuous <Continuous>  heparin  Infusion Syringe 300 Unit(s)/Hr (0.6 mL/Hr) CRRT <Continuous>  insulin regular Infusion 3 Unit(s)/Hr (3 mL/Hr) IV Continuous <Continuous>  milrinone Infusion 0.1 MICROgram(s)/kG/Min (2.79 mL/Hr) IV Continuous <Continuous>  multivitamin/minerals/iron Oral Solution (CENTRUM) 15 milliLiter(s) Oral daily  norepinephrine Infusion 0.05 MICROgram(s)/kG/Min (8.71 mL/Hr) IV Continuous <Continuous>  pantoprazole  Injectable 40 milliGRAM(s) IV Push daily  PrismaSATE Dialysate BK 0 / 3.5 5000 milliLiter(s) (2400 mL/Hr) CRRT <Continuous>  PrismaSOL Filtration BGK 0 / 2.5 5000 milliLiter(s) (800 mL/Hr) CRRT <Continuous>  PrismaSOL Filtration BGK 4 / 2.5 5000 milliLiter(s) (200 mL/Hr) CRRT <Continuous>  propofol Infusion 20 MICROgram(s)/kG/Min (11.1 mL/Hr) IV Continuous <Continuous>  thiamine 100 milliGRAM(s) Enteral Tube daily  vasopressin Infusion 0.02 Unit(s)/Min (3 mL/Hr) IV Continuous <Continuous>                                    8.2    11.33 )-----------( 77       ( 17 Dec 2022 00:12 )             27.0       12-17    136  |  102  |  19  ----------------------------<  121<H>  3.7   |  22  |  1.41<H>    Ca    8.5      17 Dec 2022 00:12  Phos  3.5     12-17  Mg     2.4     12-17    TPro  6.2  /  Alb  2.7<L>  /  TBili  1.0  /  DBili  x   /  AST  36  /  ALT  14  /  AlkPhos  100  12-17      PT/INR - ( 17 Dec 2022 00:12 )   PT: 11.6 sec;   INR: 1.01 ratio         PTT - ( 17 Dec 2022 00:12 )  PTT:27.6 sec    Mode: AC/ CMV (Assist Control/ Continuous Mandatory Ventilation)  RR (machine): 12  TV (machine): 600  FiO2: 50  PEEP: 5  ITime: 1  MAP: 17  PIP: 23      Daily Height in cm: 172.72 (16 Dec 2022 16:25)    Daily       12-16 @ 07:01  -  12-17 @ 07:00  --------------------------------------------------------  IN: 1756.3 mL / OUT: 1253 mL / NET: 503.3 mL        Critically Ill patient  : [ ] preoperative ,   [ ] post operative  [x] Non Operative    Requires :  [x ] Arterial Line   [ x] Central Line  [ ] PA catheter  [x] IABP  [ ] ECMO  [ ] LVAD  [x ] Ventilator  [ ] pacemaker [ ] Impella.                      [x ] ABG's     [x ] Pulse Oxymetry Monitoring  Bedside evaluation , monitoring , treatment of hemodynamics , fluids , IVP/ IVCD meds.        Diagnosis:     [facility]/25 [facility] arrest in [facility]     ECMO Decannulation 12/8     POD 12/6- IABP placed in cath lab     POD 1 Mitral Clip x1, TRACH 7 Merari XLT prox with ENT    MI     Acute Pancreatitis     ARDS     Hemodynamic lability,  instability. Requires IVCD [x ] vasopressors [x] inotropes  [ ] vasodilator  [x]IVSS fluid  to maintain MAP, perfusion, C.I.     Post Arrest Shock state    Respiratory Failure     Requires chest PT, pulmonary toilet, ambu bagging, suctioning to maintain SaO2,  patent airway and treat atelectasis.     Ventilator Management:  [x ]AC-rest    [ x]CPAP-PS Wean    [ ]Trach Collar     [ ]Extubate    [ ] T-Piece  [ ]peep>5     Difficult weaning process - multiple organ system involvement in critically ill patient     Sedated with IVCD Propofol for vent synchrony    CHF- acute [ x]   chronic [  ]    systolic [ x]   diatolic [ ]          - Echo- EF -   30-40% Severe segmental LV systolic dysfunction          [ ] RV dysfunction     - Cxr-cardiomegally, edema          - Clinical-  [x ]inotropes   [x ]pressors [ ]diuresis   [x]IABP      [ ]LVAD   [x]Respiratory Failure.     Cardiogenic Shock - resolving    CVVHD     Thrombocytopenia     Hypotension     IABP   [x] management   [ x] wean 1:1 1:2 1:3   [x ] removal and f/u vascular checks     Present, assisting, supervising: [ x] IABP removal    DM- IVCD Insulin    Requires bedside physical therapy, mobilization and total [facility]P care.     Tolerates NG / NJ feeds at [x] goal rate - currently off [ ] trophic rate    [ ]       rate         By signing my name below, I, Maria D'Amico, attest that this documentation has been prepared under the direction and in the presence of Finn Zelaya MD.   Electronically Signed: Maria D'Amico, Scribe 12-17-22 @ 07:39    I, Finn Zelaya, personally performed the services described in this documentation. All medical record entries made by the scribe were at my direction and in my presence. I have reviewed the chart and agree that the record reflects my personal performance and is accurate and complete.   Finn Zelaya MD.       Discussed with CT surgeon, Physician Assistant - Nurse Practitioner- Critical care medicine team.   Discussed at  AM / PM rounds.   Chart, labs , films reviewed.    Cumulative Critical Care Time Given Today:  90 min CRITICAL CARE ATTENDING - CTICU    MEDICATIONS  (STANDING):  albuterol/ipratropium for Nebulization 3 milliLiter(s) Nebulizer every 6 hours  aMIOdarone    Tablet 200 milliGRAM(s) Oral daily  atorvastatin 40 milliGRAM(s) Oral at bedtime  chlorhexidine 0.12% Liquid 15 milliLiter(s) Oral Mucosa every 12 hours  CRRT Treatment    <Continuous>  dexMEDEtomidine Infusion 0.4 MICROgram(s)/kG/Hr (9.29 mL/Hr) IV Continuous <Continuous>  heparin  Infusion 1000 Unit(s)/Hr (10 mL/Hr) IV Continuous <Continuous>  heparin  Infusion Syringe 300 Unit(s)/Hr (0.6 mL/Hr) CRRT <Continuous>  insulin regular Infusion 3 Unit(s)/Hr (3 mL/Hr) IV Continuous <Continuous>  milrinone Infusion 0.1 MICROgram(s)/kG/Min (2.79 mL/Hr) IV Continuous <Continuous>  multivitamin/minerals/iron Oral Solution (CENTRUM) 15 milliLiter(s) Oral daily  norepinephrine Infusion 0.05 MICROgram(s)/kG/Min (8.71 mL/Hr) IV Continuous <Continuous>  pantoprazole  Injectable 40 milliGRAM(s) IV Push daily  PrismaSATE Dialysate BK 0 / 3.5 5000 milliLiter(s) (2400 mL/Hr) CRRT <Continuous>  PrismaSOL Filtration BGK 0 / 2.5 5000 milliLiter(s) (800 mL/Hr) CRRT <Continuous>  PrismaSOL Filtration BGK 4 / 2.5 5000 milliLiter(s) (200 mL/Hr) CRRT <Continuous>  propofol Infusion 20 MICROgram(s)/kG/Min (11.1 mL/Hr) IV Continuous <Continuous>  thiamine 100 milliGRAM(s) Enteral Tube daily  vasopressin Infusion 0.02 Unit(s)/Min (3 mL/Hr) IV Continuous <Continuous>                                    8.2    11.33 )-----------( 77       ( 17 Dec 2022 00:12 )             27.0       12-17    136  |  102  |  19  ----------------------------<  121<H>  3.7   |  22  |  1.41<H>    Ca    8.5      17 Dec 2022 00:12  Phos  3.5     12-17  Mg     2.4     12-17    TPro  6.2  /  Alb  2.7<L>  /  TBili  1.0  /  DBili  x   /  AST  36  /  ALT  14  /  AlkPhos  100  12-17      PT/INR - ( 17 Dec 2022 00:12 )   PT: 11.6 sec;   INR: 1.01 ratio         PTT - ( 17 Dec 2022 00:12 )  PTT:27.6 sec    Mode: AC/ CMV (Assist Control/ Continuous Mandatory Ventilation)  RR (machine): 12  TV (machine): 600  FiO2: 50  PEEP: 5  ITime: 1  MAP: 17  PIP: 23      Daily Height in cm: 172.72 (16 Dec 2022 16:25)    Daily       12-16 @ 07:01  -  12-17 @ 07:00  --------------------------------------------------------  IN: 1756.3 mL / OUT: 1253 mL / NET: 503.3 mL        Critically Ill patient  : [ ] preoperative ,   [ ] post operative  [x] Non Operative    Requires :  [x ] Arterial Line   [ x] Central Line  [ ] PA catheter  [x] IABP  [ ] ECMO  [ ] LVAD  [x ] Ventilator  [ ] pacemaker [ ] Impella.                      [x ] ABG's     [x ] Pulse Oxymetry Monitoring  Bedside evaluation , monitoring , treatment of hemodynamics , fluids , IVP/ IVCD meds.        Diagnosis:     [facility]/25 - [facility] - arrest in [facility]     ECMO Decannulation 12/8     POD 12/6- IABP placed in cath lab     POD 1 Mitral Clip x1, TRACH 7 Merari XLT prox with ENT    MI     Acute Pancreatitis     ARDS     Hemodynamic lability,  instability. Requires IVCD [x ] vasopressors [x] inotropes  [ ] vasodilator  [x]IVSS fluid  to maintain MAP, perfusion, C.I.     Post Arrest Shock state    Respiratory Failure     Requires chest PT, pulmonary toilet, ambu bagging, suctioning to maintain SaO2,  patent airway and treat atelectasis.     Ventilator Management:  [x ]AC-rest    [ x]CPAP-PS Wean    [ ]Trach Collar     [ ]Extubate    [ ] T-Piece  [ ]peep>5     Difficult weaning process - multiple organ system involvement in critically ill patient     Sedated with IVCD Propofol for vent synchrony    CHF- acute [ x]   chronic [  ]    systolic [ x]   diatolic [ ]          - Echo- EF -   30-40% Severe segmental LV systolic dysfunction          [ ] RV dysfunction     - Cxr-cardiomegally, edema          - Clinical-  [x ]inotropes   [x ]pressors [ ]diuresis   [x]IABP      [ ]LVAD   [x]Respiratory Failure.     Cardiogenic Shock - resolving    CVVHD     Thrombocytopenia     Hypotension     IABP   [x] management   [ x] wean 1:1 1:2 1:3   [x ] removal and f/u vascular checks     Present, assisting, supervising: [ x] IABP removal    DM- IVCD Insulin    Requires bedside physical therapy, mobilization and total retirement care.     Tolerates NG / NJ feeds at [x] goal rate - currently off [ ] trophic rate    [ ]       rate         By signing my name below, I, Maria D'Amico, attest that this documentation has been prepared under the direction and in the presence of Finn Zelaya MD.   Electronically Signed: Maria D'Amico, Scribe 12-17-22 @ 07:39    I, Finn Zelaya, personally performed the services described in this documentation. All medical record entries made by the scribe were at my direction and in my presence. I have reviewed the chart and agree that the record reflects my personal performance and is accurate and complete.   Finn Zelaya MD.       Discussed with CT surgeon, Physician Assistant - Nurse Practitioner- Critical care medicine team.   Discussed at  AM / PM rounds.   Chart, labs , films reviewed.    Cumulative Critical Care Time Given Today:  90 min CRITICAL CARE ATTENDING - CTICU    MEDICATIONS  (STANDING):  albuterol/ipratropium for Nebulization 3 milliLiter(s) Nebulizer every 6 hours  aMIOdarone    Tablet 200 milliGRAM(s) Oral daily  atorvastatin 40 milliGRAM(s) Oral at bedtime  chlorhexidine 0.12% Liquid 15 milliLiter(s) Oral Mucosa every 12 hours  CRRT Treatment    <Continuous>  dexMEDEtomidine Infusion 0.4 MICROgram(s)/kG/Hr (9.29 mL/Hr) IV Continuous <Continuous>  heparin  Infusion 1000 Unit(s)/Hr (10 mL/Hr) IV Continuous <Continuous>  heparin  Infusion Syringe 300 Unit(s)/Hr (0.6 mL/Hr) CRRT <Continuous>  insulin regular Infusion 3 Unit(s)/Hr (3 mL/Hr) IV Continuous <Continuous>  milrinone Infusion 0.1 MICROgram(s)/kG/Min (2.79 mL/Hr) IV Continuous <Continuous>  multivitamin/minerals/iron Oral Solution (CENTRUM) 15 milliLiter(s) Oral daily  norepinephrine Infusion 0.05 MICROgram(s)/kG/Min (8.71 mL/Hr) IV Continuous <Continuous>  pantoprazole  Injectable 40 milliGRAM(s) IV Push daily  PrismaSATE Dialysate BK 0 / 3.5 5000 milliLiter(s) (2400 mL/Hr) CRRT <Continuous>  PrismaSOL Filtration BGK 0 / 2.5 5000 milliLiter(s) (800 mL/Hr) CRRT <Continuous>  PrismaSOL Filtration BGK 4 / 2.5 5000 milliLiter(s) (200 mL/Hr) CRRT <Continuous>  propofol Infusion 20 MICROgram(s)/kG/Min (11.1 mL/Hr) IV Continuous <Continuous>  thiamine 100 milliGRAM(s) Enteral Tube daily  vasopressin Infusion 0.02 Unit(s)/Min (3 mL/Hr) IV Continuous <Continuous>                                    8.2    11.33 )-----------( 77       ( 17 Dec 2022 00:12 )             27.0       12-17    136  |  102  |  19  ----------------------------<  121<H>  3.7   |  22  |  1.41<H>    Ca    8.5      17 Dec 2022 00:12  Phos  3.5     12-17  Mg     2.4     12-17    TPro  6.2  /  Alb  2.7<L>  /  TBili  1.0  /  DBili  x   /  AST  36  /  ALT  14  /  AlkPhos  100  12-17      PT/INR - ( 17 Dec 2022 00:12 )   PT: 11.6 sec;   INR: 1.01 ratio         PTT - ( 17 Dec 2022 00:12 )  PTT:27.6 sec    Mode: AC/ CMV (Assist Control/ Continuous Mandatory Ventilation)  RR (machine): 12  TV (machine): 600  FiO2: 50  PEEP: 5  ITime: 1  MAP: 17  PIP: 23      Daily Height in cm: 172.72 (16 Dec 2022 16:25)    Daily       12-16 @ 07:01  -  12-17 @ 07:00  --------------------------------------------------------  IN: 1756.3 mL / OUT: 1253 mL / NET: 503.3 mL        Critically Ill patient  : [ ] preoperative ,   [ ] post operative  [x] Non Operative    Requires :  [x ] Arterial Line   [ x] Central Line  [ ] PA catheter  [x] IABP  [ ] ECMO  [ ] LVAD  [x ] Ventilator  [ ] pacemaker [ ] Impella.                      [x ] ABG's     [x ] Pulse Oxymetry Monitoring  Bedside evaluation , monitoring , treatment of hemodynamics , fluids , IVP/ IVCD meds.        Diagnosis:     11/25 - [facility] ECMO - arrest in SICU     ECMO Decannulation 12/8     POD 12/6- IABP placed in cath lab     POD 1 Mitral Clip x1, TRACH 7 Merari XLT prox with ENT    MI     Acute Pancreatitis     ARDS     Hemodynamic lability,  instability. Requires IVCD [x ] vasopressors [x] inotropes  [ ] vasodilator  [x]IVSS fluid  to maintain MAP, perfusion, C.I.     Post Arrest Shock state    Respiratory Failure     Requires chest PT, pulmonary toilet, ambu bagging, suctioning to maintain SaO2,  patent airway and treat atelectasis.     Ventilator Management:  [x ]AC-rest    [ x]CPAP-PS Wean    [ ]Trach Collar     [ ]Extubate    [ ] T-Piece  [ ]peep>5     Difficult weaning process - multiple organ system involvement in critically ill patient     Sedated with IVCD Propofol for vent synchrony    CHF- acute [ x]   chronic [  ]    systolic [ x]   diatolic [ ]          - Echo- EF -   30-40% Severe segmental LV systolic dysfunction          [ ] RV dysfunction     - Cxr-cardiomegally, edema          - Clinical-  [x ]inotropes   [x ]pressors [ ]diuresis   [x]IABP      [ ]LVAD   [x]Respiratory Failure.     Cardiogenic Shock - resolving    CVVHD     Thrombocytopenia     Hypotension     IABP   [x] management   [ x] wean 1:1 1:2 1:3   [x ] removal and f/u vascular checks     Present, assisting, supervising: [ x] IABP removal    DM- IVCD Insulin    Requires bedside physical therapy, mobilization and total detention care.     Tolerates NG / NJ feeds at [x] goal rate - currently off [ ] trophic rate    [ ]       rate         By signing my name below, I, Maria D'Amico, attest that this documentation has been prepared under the direction and in the presence of Finn Zelaya MD.   Electronically Signed: Maria D'Amico, Scribe 12-17-22 @ 07:39    I, Finn Zelaya, personally performed the services described in this documentation. All medical record entries made by the scribe were at my direction and in my presence. I have reviewed the chart and agree that the record reflects my personal performance and is accurate and complete.   Finn Zelaya MD.       Discussed with CT surgeon, Physician Assistant - Nurse Practitioner- Critical care medicine team.   Discussed at  AM / PM rounds.   Chart, labs , films reviewed.    Cumulative Critical Care Time Given Today:  90 min

## 2022-12-17 NOTE — CHART NOTE - NSCHARTNOTEFT_GEN_A_CORE
Removal of Intra-Aortic Balloon Pump Removal Note    The IABP (intra-aortic balloon pump) was weaned according to protocol. Hemodynamics remained stable. Pulses in the left lower extremity are palpable. The patient was placed in the supine position. The insertion site was identified and the sutures were removed. The IABP was turned off and the balloon deflated. The IABP was then removed. Direct pressure was applied for 40 minutes. A sandbag was applied and is to remain in place for three hours.    Monitoring of the left groin and both lower extremities including neuro-vascular checks and vital signs every 15 minutes x 4, then every 30 minutes x 2, then every 1 hour x 4 was ordered.    Complications:  None

## 2022-12-17 NOTE — PROGRESS NOTE ADULT - SUBJECTIVE AND OBJECTIVE BOX
Patient seen and examined at the bedside.    Remained critically ill on continuous ICU monitoring.    =================== LABS =========================                        8.7    11.92 )-----------( 91       ( 17 Dec 2022 17:26 )             28.3         136  |  102  |  19  ----------------------------<  121<H>  3.7   |  22  |  1.41<H>    Ca    8.5      17 Dec 2022 00:12  Phos  3.5       Mg     2.4         TPro  6.2  /  Alb  2.7<L>  /  TBili  1.0  /  DBili  x   /  AST  36  /  ALT  14  /  AlkPhos  100      LIVER FUNCTIONS - ( 17 Dec 2022 00:12 )  Alb: 2.7 g/dL / Pro: 6.2 g/dL / ALK PHOS: 100 U/L / ALT: 14 U/L / AST: 36 U/L / GGT: x           PT/INR - ( 17 Dec 2022 00:12 )   PT: 11.6 sec;   INR: 1.01 ratio         PTT - ( 17 Dec 2022 08:20 )  PTT:54.7 sec  ABG - ( 17 Dec 2022 17:00 )  pH, Arterial: 7.39  pH, Blood: x     /  pCO2: 39    /  pO2: 184   / HCO3: 24    / Base Excess: -1.2  /  SaO2: 98.8              Urinalysis Basic - ( 16 Dec 2022 04:28 )    Color: DK BROWN / Appearance: Turbid / S.029 / pH: x  Gluc: x / Ketone: Negative  / Bili: Negative / Urobili: 6 mg/dL   Blood: x / Protein: 300 mg/dL / Nitrite: Negative   Leuk Esterase: Large / RBC: 369 /hpf /  /HPF   Sq Epi: x / Non Sq Epi: 8 /hpf / Bacteria: Moderate        ==================================================    OBJECTIVE:  Vital Signs Last 24 Hrs  T(C): 36.5 (17 Dec 2022 19:00), Max: 37.3 (17 Dec 2022 08:00)  T(F): 97.7 (17 Dec 2022 19:00), Max: 99.1 (17 Dec 2022 08:00)  HR: 96 (17 Dec 2022 19:15) (70 - 120)  BP: 91/52 (17 Dec 2022 16:30) (91/52 - 91/52)  BP(mean): 67 (17 Dec 2022 16:30) (67 - 67)  RR: 21 (17 Dec 2022 19:15) (12 - 23)  SpO2: 100% (17 Dec 2022 19:15) (91% - 100%)    Parameters below as of 17 Dec 2022 19:00  Patient On (Oxygen Delivery Method): ventilator        REVIEW OF SYSTEMS:  [x ] N/A    Physical Exam:  General: Intubated, multiple lines gtt  Neurology: sedated   Eyes: bilateral pupils equal and reactive   ENT/Neck: +trach, Neck supple, trachea midline, No JVD   Respiratory: rhonchi bilaterally   CV: S1S2, no murmurs        [x] Sinus Rhythm  Abdominal: Softly distended,+BS   Extremities: 1+ pedal edema noted, + peripheral pulses palpable, warm  Skin: No Rashes, Hematoma, Ecchymosis                             Assessment:  61 y/o male with Pancreatitis and Cardiogenic shock    Acute respiratory distress/failure, intubated ,   Cardiogenic shock s/p [facility] ECMO decannulated POD 9  CAD/ASHD/CABG, acute MI  Acute kidney injury  Encounter management IABP  Encounter weaning ventilator  Leukocytosis   Anemia  Thrombocytopenia   Acute pancreatitis    Plan:   ***Neuro***  CT head showed 4mm subdural hematoma : repeat CT head unchanged   Soft palate laceration, ENT scoped , stable, no acute intervention at this time  Repeat Head CT  Similar minimal increased density along the right aspect of the posterior   falx and right tentorial leaf, consistent with minimal residual subdural   hemorrhage.  No parenchymal hemorrhage or brain edema.  [x] Sedated with [x] Precedex and [x] Propofol       ***Cardiovascular***  Invasive hemodynamic monitoring, assess perfusion indices   SR / / MAP 67/  SvO2 77.7/ Hct 28.3/ Lactate 1.1   [x] Levophed - off [x] Vasopressin 0.1 [x] Primacor - 0.1mcg / [x] IABP - weaned today   Volume: [x] Albumin 100cc  [x] PRBC 2U given today   Reassessment of hemodynamics post resuscitation   Amiodarone for rate control   [x] AC therapy with IV Heparin  [x] VTE prophylaxis with SubQ Heparin   [x] Statin   Serial EKG and cardiac enzymes     ***Pulmonary***  Post op vent management   Titration of FiO2 and PEEP, follow SpO2, CXR, blood gasses   C/w bronchodilators     Mode: AC/CMV Mechanical Ventilation   FiO2: 50  PEEP: 5  ITime: 1  MAP: 7  PC: 15  PIP: 20     Will plan to wean & extubate once pt is hemodynamically stable and not bleeding    ***GI***  [x] Diet: Tolerating TF Glucerna, @ 0cc/hr with goal 65cc/hr.  [x] Protonix     ***Renal***  GFR 56/ [x] ERIC on CRRT   Continue to monitor I/Os, BUN/Creatinine.   Replete lytes PRN  De Dios present [ x] negative [ ] positive     ***ID***  No active antibiotic coverage   BCx 12/3, , 12/15, 12/10 NGTD   SCx 12/3, , 12/15, 12/10 NGTD   ***Endocrine***   [x] DM2  HbA1c 7.3%               - [x] Insulin gtt               - Need tight glycemic control to prevent wound infection.        Patient requires continuous monitoring with bedside rhythm monitoring, pulse oximetry monitoring, and continuous central venous and arterial pressure monitoring; and intermittent blood gas analysis. Care plan discussed with the ICU care team.   Patient remained critical, at risk for life threatening decompensation.    By signing my name below, I, Leela Avila, attest that this documentation has been prepared under the direction and in the presence of Kylah Chamorro NP   Electronically signed: Jolly Oakes, 22 @ 19:33    I, Kylah Chamorro, personally performed the services described in this documentation. all medical record entries made by the jolly were at my direction and in my presence. I have reviewed the chart and agree that the record reflects my personal performance and is accurate and complete  Electronically signed: Kylah Chamorro NP  Patient seen and examined at the bedside.    Remained critically ill on continuous ICU monitoring.    =================== LABS =========================                        8.7    11.92 )-----------( 91       ( 17 Dec 2022 17:26 )             28.3         136  |  102  |  19  ----------------------------<  121<H>  3.7   |  22  |  1.41<H>    Ca    8.5      17 Dec 2022 00:12  Phos  3.5       Mg     2.4         TPro  6.2  /  Alb  2.7<L>  /  TBili  1.0  /  DBili  x   /  AST  36  /  ALT  14  /  AlkPhos  100      LIVER FUNCTIONS - ( 17 Dec 2022 00:12 )  Alb: 2.7 g/dL / Pro: 6.2 g/dL / ALK PHOS: 100 U/L / ALT: 14 U/L / AST: 36 U/L / GGT: x           PT/INR - ( 17 Dec 2022 00:12 )   PT: 11.6 sec;   INR: 1.01 ratio         PTT - ( 17 Dec 2022 08:20 )  PTT:54.7 sec  ABG - ( 17 Dec 2022 17:00 )  pH, Arterial: 7.39  pH, Blood: x     /  pCO2: 39    /  pO2: 184   / HCO3: 24    / Base Excess: -1.2  /  SaO2: 98.8              Urinalysis Basic - ( 16 Dec 2022 04:28 )    Color: DK BROWN / Appearance: Turbid / S.029 / pH: x  Gluc: x / Ketone: Negative  / Bili: Negative / Urobili: 6 mg/dL   Blood: x / Protein: 300 mg/dL / Nitrite: Negative   Leuk Esterase: Large / RBC: 369 /hpf /  /HPF   Sq Epi: x / Non Sq Epi: 8 /hpf / Bacteria: Moderate        ==================================================    OBJECTIVE:  Vital Signs Last 24 Hrs  T(C): 36.5 (17 Dec 2022 19:00), Max: 37.3 (17 Dec 2022 08:00)  T(F): 97.7 (17 Dec 2022 19:00), Max: 99.1 (17 Dec 2022 08:00)  HR: 96 (17 Dec 2022 19:15) (70 - 120)  BP: 91/52 (17 Dec 2022 16:30) (91/52 - 91/52)  BP(mean): 67 (17 Dec 2022 16:30) (67 - 67)  RR: 21 (17 Dec 2022 19:15) (12 - 23)  SpO2: 100% (17 Dec 2022 19:15) (91% - 100%)    Parameters below as of 17 Dec 2022 19:00  Patient On (Oxygen Delivery Method): ventilator        REVIEW OF SYSTEMS:  [x ] N/A    Physical Exam:  General: Intubated, multiple lines gtt  Neurology: sedated   Eyes: bilateral pupils equal and reactive   ENT/Neck: +trach, Neck supple, trachea midline, No JVD   Respiratory: rhonchi bilaterally   CV: S1S2, no murmurs        [x] Sinus Rhythm  Abdominal: Softly distended,+BS   Extremities: 1+ pedal edema noted, + peripheral pulses palpable, warm  Skin: No Rashes, Hematoma, Ecchymosis                             Assessment:  61 y/o male with Pancreatitis and Cardiogenic shock    Acute respiratory distress/failure, intubated ,   Cardiogenic shock s/p [facility] ECMO decannulated POD 9  CAD/ASHD/CABG, acute MI  Acute kidney injury  Encounter management IABP  Encounter weaning ventilator  Leukocytosis   Anemia  Thrombocytopenia   Acute pancreatitis    Plan:   ***Neuro***  CT head showed 4mm subdural hematoma : repeat CT head unchanged   Soft palate laceration, ENT scoped , stable, no acute intervention at this time  Repeat Head CT  Similar minimal increased density along the right aspect of the posterior   falx and right tentorial leaf, consistent with minimal residual subdural   hemorrhage.  No parenchymal hemorrhage or brain edema.  [x] Sedated with [x] Precedex and [x] Propofol       ***Cardiovascular***  Invasive hemodynamic monitoring, assess perfusion indices   SR / / MAP 67/  SvO2 77.7/ Hct 28.3/ Lactate 1.1   [x] Levophed - off [x] Vasopressin 0.1 [x] Primacor - 0.1mcg / [x] IABP - weaned today   Volume: [x] Albumin 100cc  [x] PRBC 2U given today   Reassessment of hemodynamics post resuscitation   Amiodarone for rate control   [x] AC therapy with IV Heparin  [x] VTE prophylaxis with SubQ Heparin   [x] Statin   Serial EKG and cardiac enzymes     ***Pulmonary***  Post op vent management   Titration of FiO2 and PEEP, follow SpO2, CXR, blood gasses   C/w bronchodilators     Mode: AC/CMV Mechanical Ventilation   FiO2: 50  PEEP: 5  ITime: 1  MAP: 7  PC: 15  PIP: 20     Will plan to wean & extubate once pt is hemodynamically stable and not bleeding    ***GI***  [x] Diet: Tolerating TF Glucerna, @ 0cc/hr with goal 65cc/hr.  [x] Protonix     ***Renal***  GFR 56/ [x] ERIC on CRRT   Continue to monitor I/Os, BUN/Creatinine.   Replete lytes PRN  De Dios present [ x] negative [ ] positive     ***ID***  No active antibiotic coverage   BCx 12/3, , 12/15, 12/10 NGTD   SCx 12/3, , 12/15, 12/10 NGTD   ***Endocrine***   [x] DM2  HbA1c 7.3%               - [x] Insulin gtt               - Need tight glycemic control to prevent wound infection.        Patient requires continuous monitoring with bedside rhythm monitoring, pulse oximetry monitoring, and continuous central venous and arterial pressure monitoring; and intermittent blood gas analysis. Care plan discussed with the ICU care team.   Patient remained critical, at risk for life threatening decompensation.    By signing my name below, I, Leela vAila, attest that this documentation has been prepared under the direction and in the presence of Kylah Chamorro NP   Electronically signed: Jolly Oakes, 22 @ 19:33    I, Kylah Chamorro, personally performed the services described in this documentation. all medical record entries made by the jolly were at my direction and in my presence. I have reviewed the chart and agree that the record reflects my personal performance and is accurate and complete  Electronically signed: Kylah Chamorro NP  Patient seen and examined at the bedside.    Remained critically ill on continuous ICU monitoring.    =================== LABS =========================                        8.7    11.92 )-----------( 91       ( 17 Dec 2022 17:26 )             28.3         136  |  102  |  19  ----------------------------<  121<H>  3.7   |  22  |  1.41<H>    Ca    8.5      17 Dec 2022 00:12  Phos  3.5       Mg     2.4         TPro  6.2  /  Alb  2.7<L>  /  TBili  1.0  /  DBili  x   /  AST  36  /  ALT  14  /  AlkPhos  100      LIVER FUNCTIONS - ( 17 Dec 2022 00:12 )  Alb: 2.7 g/dL / Pro: 6.2 g/dL / ALK PHOS: 100 U/L / ALT: 14 U/L / AST: 36 U/L / GGT: x           PT/INR - ( 17 Dec 2022 00:12 )   PT: 11.6 sec;   INR: 1.01 ratio         PTT - ( 17 Dec 2022 08:20 )  PTT:54.7 sec  ABG - ( 17 Dec 2022 17:00 )  pH, Arterial: 7.39  pH, Blood: x     /  pCO2: 39    /  pO2: 184   / HCO3: 24    / Base Excess: -1.2  /  SaO2: 98.8              Urinalysis Basic - ( 16 Dec 2022 04:28 )    Color: DK BROWN / Appearance: Turbid / S.029 / pH: x  Gluc: x / Ketone: Negative  / Bili: Negative / Urobili: 6 mg/dL   Blood: x / Protein: 300 mg/dL / Nitrite: Negative   Leuk Esterase: Large / RBC: 369 /hpf /  /HPF   Sq Epi: x / Non Sq Epi: 8 /hpf / Bacteria: Moderate        ==================================================    OBJECTIVE:  Vital Signs Last 24 Hrs  T(C): 36.5 (17 Dec 2022 19:00), Max: 37.3 (17 Dec 2022 08:00)  T(F): 97.7 (17 Dec 2022 19:00), Max: 99.1 (17 Dec 2022 08:00)  HR: 96 (17 Dec 2022 19:15) (70 - 120)  BP: 91/52 (17 Dec 2022 16:30) (91/52 - 91/52)  BP(mean): 67 (17 Dec 2022 16:30) (67 - 67)  RR: 21 (17 Dec 2022 19:15) (12 - 23)  SpO2: 100% (17 Dec 2022 19:15) (91% - 100%)    Parameters below as of 17 Dec 2022 19:00  Patient On (Oxygen Delivery Method): ventilator        REVIEW OF SYSTEMS:  [x ] N/A    Physical Exam:  General: Intubated, multiple lines gtt  Neurology: sedated   Eyes: bilateral pupils equal and reactive   ENT/Neck: +trach, Neck supple, trachea midline, No JVD   Respiratory: rhonchi bilaterally   CV: S1S2, no murmurs        [x] Sinus Rhythm  Abdominal: Softly distended,+BS   Extremities: 1+ pedal edema noted, + peripheral pulses palpable, warm  Skin: No Rashes, Hematoma, Ecchymosis                             Assessment:  63 y/o male with Pancreatitis and Cardiogenic shock    Acute respiratory distress/failure, intubated ,   Cardiogenic shock s/p VA ECMO decannulated POD 9  CAD/ASHD/CABG, acute MI  Acute kidney injury  Encounter management IABP  Encounter weaning ventilator  Leukocytosis   Anemia  Thrombocytopenia   Acute pancreatitis    Plan:   ***Neuro***  CT head showed 4mm subdural hematoma : repeat CT head unchanged   Soft palate laceration, ENT scoped , stable, no acute intervention at this time  Repeat Head CT  Similar minimal increased density along the right aspect of the posterior   falx and right tentorial leaf, consistent with minimal residual subdural   hemorrhage.  No parenchymal hemorrhage or brain edema.  [x] Sedated with [x] Precedex and [x] Propofol       ***Cardiovascular***  Invasive hemodynamic monitoring, assess perfusion indices   SR / / MAP 67/  SvO2 77.7/ Hct 28.3/ Lactate 1.1   [x] Levophed - off [x] Vasopressin 0.1 [x] Primacor - 0.1mcg / [x] IABP - weaned today   Volume: [x] Albumin 100cc  [x] PRBC 2U given today   Reassessment of hemodynamics post resuscitation   Amiodarone for rate control   [x] AC therapy with IV Heparin  [x] VTE prophylaxis with SubQ Heparin   [x] Statin   Serial EKG and cardiac enzymes     ***Pulmonary***  Post op vent management   Titration of FiO2 and PEEP, follow SpO2, CXR, blood gasses   C/w bronchodilators     Mode: AC/CMV Mechanical Ventilation   FiO2: 50  PEEP: 5  ITime: 1  MAP: 7  PC: 15  PIP: 20     Will plan to wean & extubate once pt is hemodynamically stable and not bleeding    ***GI***  [x] Diet: Tolerating TF Glucerna, @ 0cc/hr with goal 65cc/hr.  [x] Protonix     ***Renal***  GFR 56/ [x] ERIC on CRRT   Continue to monitor I/Os, BUN/Creatinine.   Replete lytes PRN  De Dios present [ x] negative [ ] positive     ***ID***  No active antibiotic coverage   BCx 12/3, , 12/15, 12/10 NGTD   SCx 12/3, , 12/15, 12/10 NGTD   ***Endocrine***   [x] DM2  HbA1c 7.3%               - [x] Insulin gtt               - Need tight glycemic control to prevent wound infection.        Patient requires continuous monitoring with bedside rhythm monitoring, pulse oximetry monitoring, and continuous central venous and arterial pressure monitoring; and intermittent blood gas analysis. Care plan discussed with the ICU care team.   Patient remained critical, at risk for life threatening decompensation.    By signing my name below, I, Leela Avila, attest that this documentation has been prepared under the direction and in the presence of Kylah Chamorro NP   Electronically signed: Jolly Oakes, 22 @ 19:33    I, Kylah Chamorro, personally performed the services described in this documentation. all medical record entries made by the jolly were at my direction and in my presence. I have reviewed the chart and agree that the record reflects my personal performance and is accurate and complete  Electronically signed: Kylah Chamorro NP

## 2022-12-17 NOTE — PROGRESS NOTE ADULT - PROBLEM SELECTOR PLAN 1
Pt with non oliguric ERIC/ ATN in the setting of STEMI, cardiac arrest & cardiogenic shock  SCr on arrival was 2.15; trended down to hector 1.6 on 11/25;  slowly trended up & increased to 3.95 11/28.   Pt received IV diuretics. SCr increased to 4.19 with BUN of 101 on 12/5/22. Pt with significant volume overload. Pt initiated on CRRT/CVVHDF to optimize volume and electrolytes on 12/5/22. BP being maintained on vasopressors. Pt likely with ATN. Pt underwent decannulation of ECMO on 12/8/22 and was taken off CRRT. Pt reinitiated on CRRT overnight on 12/9/22 for volume overload. Labs reviewed.  s/p trach on 12/16 with FiO2 50%. CRRT without any issue with UF 50cc/hr. Off IV vasopressors. Remains on milrinone gtt. Pt remains anuric. Plan is to continue CRRT with net negative. Monitor labs and urine output. Avoid any potential nephrotoxins. Dose medications as per eGFR. Pt with non oliguric ERCI/ ATN in the setting of STEMI, cardiac arrest & cardiogenic shock  SCr on arrival was 2.15; trended down to hector 1.6 on 11/25;  slowly trended up & increased to 3.95 11/28.   Pt received IV diuretics. SCr increased to 4.19 with BUN of 101 on 12/5/22. Pt with significant volume overload. Pt initiated on CRRT/CVVHDF to optimize volume and electrolytes on 12/5/22. BP being maintained on vasopressors. Pt likely with ATN. Pt underwent decannulation of ECMO on 12/8/22 and was taken off CRRT. Pt reinitiated on CRRT overnight on 12/9/22 for volume overload. Labs reviewed.  s/p trach on 12/16 with FiO2 50%. CRRT without any issue with UF 50cc/hr. Off IV vasopressors. Remains on milrinone gtt. Pt remains anuric. Plan is to continue CRRT with net negative. Monitor labs and urine output. Avoid any potential nephrotoxins. Dose medications as per eGFR.

## 2022-12-17 NOTE — PROGRESS NOTE ADULT - SUBJECTIVE AND OBJECTIVE BOX
[facility] Division of Kidney Diseases & Hypertension  FOLLOW UP NOTE  [phone]--------------------------------------------------------------------------------  Chief Complaint:Acute pancreatitis without infection or necrosis        HPI:  63 y/o male with PMH of CABG, DM, HTN, HLD presenting as transfer from [place] for c/f STEMI. Course c/b gallstone pancreatitis leading to ARDS and shock & cardiac arrest now on VA ECMO. Had significant troponin elevation and his LVEF down to 8%. Pt was deemed to be too unstable to have an angiogram and potential PCI due to his co-morbidities & a new subdural bleed. Pt being seen for ERIC. SCr on arrival was 2.15; trended down to hector 1.6 on 11/25 and eventually increased to 3.95 11/28. Pt received IV diuretics as per CTU.    Pt initiated on CRRT on 12/5/22 to optimize volume status and electrolytes. Pt tolerating CRRT overnight with target net negative fluid balance. Pt underwent decannulation of ECMO on 12/8/22. Pt was restarted overnight on 12/9/22 for volume overload. Pt with clotting of CRRT circuit on 12/14 and was initiated on heparin with CRRT circuit.  Pt tolerating CRRT with no clotting of filter overnight. Pt underwent mitral clip OR (12/16/22).       Patient seen & examined today. Pt is intubated, unable to obtain ROS. s/p trach on 12/16 with FiO2 50%. CRRT without any issue with UF 50cc/hr. Pt is anuric with 12cc on bladder scan. Remains on milrinone gtt & IABP. CVP 6.           PAST HISTORY  --------------------------------------------------------------------------------  No significant changes to PMH, PSH, FHx, SHx, unless otherwise noted    ALLERGIES & MEDICATIONS  --------------------------------------------------------------------------------  Allergies    No Known Allergies    Intolerances      Standing Inpatient Medications  albuterol/ipratropium for Nebulization 3 milliLiter(s) Nebulizer every 6 hours  aMIOdarone    Tablet 200 milliGRAM(s) Oral daily  atorvastatin 40 milliGRAM(s) Oral at bedtime  chlorhexidine 0.12% Liquid 15 milliLiter(s) Oral Mucosa every 12 hours  CRRT Treatment    <Continuous>  dexMEDEtomidine Infusion 0.4 MICROgram(s)/kG/Hr IV Continuous <Continuous>  heparin  Infusion Syringe 300 Unit(s)/Hr CRRT <Continuous>  insulin regular Infusion 3 Unit(s)/Hr IV Continuous <Continuous>  milrinone Infusion 0.1 MICROgram(s)/kG/Min IV Continuous <Continuous>  multivitamin/minerals/iron Oral Solution (CENTRUM) 15 milliLiter(s) Oral daily  norepinephrine Infusion 0.05 MICROgram(s)/kG/Min IV Continuous <Continuous>  pantoprazole  Injectable 40 milliGRAM(s) IV Push daily  PrismaSATE Dialysate BK 0 / 3.5 5000 milliLiter(s) CRRT <Continuous>  PrismaSOL Filtration BGK 0 / 2.5 5000 milliLiter(s) CRRT <Continuous>  PrismaSOL Filtration BGK 4 / 2.5 5000 milliLiter(s) CRRT <Continuous>  propofol Infusion 20 MICROgram(s)/kG/Min IV Continuous <Continuous>  thiamine 100 milliGRAM(s) Enteral Tube daily  vasopressin Infusion 0.02 Unit(s)/Min IV Continuous <Continuous>    PRN Inpatient Medications      REVIEW OF SYSTEMS  --------------------------------------------------------------------------------  unable to obtain    VITALS/PHYSICAL EXAM  --------------------------------------------------------------------------------  T(C): 37.3 (12-17-22 @ 08:00), Max: 37.7 (12-16-22 @ 10:30)  HR: 115 (12-17-22 @ 09:26) (79 - 128)  BP: --  RR: 21 (12-17-22 @ 08:00) (10 - 38)  SpO2: 100% (12-17-22 @ 09:26) (98% - 100%)  Wt(kg): --  Height (cm): 172.7 (12-16-22 @ 16:25)  Weight (kg): 92.9 (12-16-22 @ 16:25)  BMI (kg/m2): 31.1 (12-16-22 @ 16:25)  BSA (m2): 2.06 (12-16-22 @ 16:25)      12-16-22 @ 07:01  -  12-17-22 @ 07:00  --------------------------------------------------------  IN: 1756.3 mL / OUT: 1468 mL / NET: 288.3 mL    12-17-22 @ 07:01  -  12-17-22 @ 09:48  --------------------------------------------------------  IN: 189.2 mL / OUT: 182 mL / NET: 7.2 mL      Physical Exam:  	Gen: ill appearing male, intubated   	HEENT: Anicteric  	Pulm:  trach+  	CV: S1S2+  	Abd: Soft, +BS       	Ext: LE edema B/L++  	Neuro: Sedated  	Skin: Warm and dry             Acces: RIJ non tunneled HD catheter, +IABP    LABS/STUDIES  --------------------------------------------------------------------------------              8.2    11.33 >-----------<  77       [12-17-22 @ 00:12]              27.0     136  |  102  |  19  ----------------------------<  121      [12-17-22 @ 00:12]  3.7   |  22  |  1.41        Ca     8.5     [12-17-22 @ 00:12]      Mg     2.4     [12-17-22 @ 00:12]      Phos  3.5     [12-17-22 @ 00:12]    TPro  6.2  /  Alb  2.7  /  TBili  1.0  /  DBili  x   /  AST  36  /  ALT  14  /  AlkPhos  100  [12-17-22 @ 00:12]    PT/INR: PT 11.6 , INR 1.01       [12-17-22 @ 00:12]  PTT: 54.7       [12-17-22 @ 08:20]    CK 27      [12-16-22 @ 10:55]    Creatinine Trend:  SCr 1.41 [12-17 @ 00:12]  SCr 1.88 [12-16 @ 10:55]  SCr 1.41 [12-16 @ 00:23]  SCr 1.63 [12-15 @ 00:46]  SCr 1.43 [12-14 @ 00:34]    Urinalysis - [12-16-22 @ 04:28]      Color DK BROWN / Appearance Turbid / SG 1.029 / pH 6.0      Gluc Trace / Ketone Negative  / Bili Negative / Urobili 6 mg/dL       Blood Large / Protein 300 mg/dL / Leuk Est Large / Nitrite Negative       /  / Hyaline 85 / Gran  / Sq Epi  / Non Sq Epi 8 / Bacteria Moderate      TSH 2.84      [12-10-22 @ 15:55]       [facility] Division of Kidney Diseases & Hypertension  FOLLOW UP NOTE  [phone]--------------------------------------------------------------------------------  Chief Complaint:Acute pancreatitis without infection or necrosis        HPI:  61 y/o male with PMH of CABG, DM, HTN, HLD presenting as transfer from [place] for c/f STEMI. Course c/b gallstone pancreatitis leading to ARDS and shock & cardiac arrest now on VA ECMO. Had significant troponin elevation and his LVEF down to 8%. Pt was deemed to be too unstable to have an angiogram and potential PCI due to his co-morbidities & a new subdural bleed. Pt being seen for ERIC. SCr on arrival was 2.15; trended down to hector 1.6 on 11/25 and eventually increased to 3.95 11/28. Pt received IV diuretics as per CTU.    Pt initiated on CRRT on 12/5/22 to optimize volume status and electrolytes. Pt tolerating CRRT overnight with target net negative fluid balance. Pt underwent decannulation of ECMO on 12/8/22. Pt was restarted overnight on 12/9/22 for volume overload. Pt with clotting of CRRT circuit on 12/14 and was initiated on heparin with CRRT circuit.  Pt tolerating CRRT with no clotting of filter overnight. Pt underwent mitral clip OR (12/16/22).       Patient seen & examined today. Pt is intubated, unable to obtain ROS. s/p trach on 12/16 with FiO2 50%. CRRT without any issue with UF 50cc/hr. Pt is anuric with 12cc on bladder scan. Remains on milrinone gtt & IABP. CVP 6.           PAST HISTORY  --------------------------------------------------------------------------------  No significant changes to PMH, PSH, FHx, SHx, unless otherwise noted    ALLERGIES & MEDICATIONS  --------------------------------------------------------------------------------  Allergies    No Known Allergies    Intolerances      Standing Inpatient Medications  albuterol/ipratropium for Nebulization 3 milliLiter(s) Nebulizer every 6 hours  aMIOdarone    Tablet 200 milliGRAM(s) Oral daily  atorvastatin 40 milliGRAM(s) Oral at bedtime  chlorhexidine 0.12% Liquid 15 milliLiter(s) Oral Mucosa every 12 hours  CRRT Treatment    <Continuous>  dexMEDEtomidine Infusion 0.4 MICROgram(s)/kG/Hr IV Continuous <Continuous>  heparin  Infusion Syringe 300 Unit(s)/Hr CRRT <Continuous>  insulin regular Infusion 3 Unit(s)/Hr IV Continuous <Continuous>  milrinone Infusion 0.1 MICROgram(s)/kG/Min IV Continuous <Continuous>  multivitamin/minerals/iron Oral Solution (CENTRUM) 15 milliLiter(s) Oral daily  norepinephrine Infusion 0.05 MICROgram(s)/kG/Min IV Continuous <Continuous>  pantoprazole  Injectable 40 milliGRAM(s) IV Push daily  PrismaSATE Dialysate BK 0 / 3.5 5000 milliLiter(s) CRRT <Continuous>  PrismaSOL Filtration BGK 0 / 2.5 5000 milliLiter(s) CRRT <Continuous>  PrismaSOL Filtration BGK 4 / 2.5 5000 milliLiter(s) CRRT <Continuous>  propofol Infusion 20 MICROgram(s)/kG/Min IV Continuous <Continuous>  thiamine 100 milliGRAM(s) Enteral Tube daily  vasopressin Infusion 0.02 Unit(s)/Min IV Continuous <Continuous>    PRN Inpatient Medications      REVIEW OF SYSTEMS  --------------------------------------------------------------------------------  unable to obtain    VITALS/PHYSICAL EXAM  --------------------------------------------------------------------------------  T(C): 37.3 (12-17-22 @ 08:00), Max: 37.7 (12-16-22 @ 10:30)  HR: 115 (12-17-22 @ 09:26) (79 - 128)  BP: --  RR: 21 (12-17-22 @ 08:00) (10 - 38)  SpO2: 100% (12-17-22 @ 09:26) (98% - 100%)  Wt(kg): --  Height (cm): 172.7 (12-16-22 @ 16:25)  Weight (kg): 92.9 (12-16-22 @ 16:25)  BMI (kg/m2): 31.1 (12-16-22 @ 16:25)  BSA (m2): 2.06 (12-16-22 @ 16:25)      12-16-22 @ 07:01  -  12-17-22 @ 07:00  --------------------------------------------------------  IN: 1756.3 mL / OUT: 1468 mL / NET: 288.3 mL    12-17-22 @ 07:01  -  12-17-22 @ 09:48  --------------------------------------------------------  IN: 189.2 mL / OUT: 182 mL / NET: 7.2 mL      Physical Exam:  	Gen: ill appearing male, intubated   	HEENT: Anicteric  	Pulm:  trach+  	CV: S1S2+  	Abd: Soft, +BS       	Ext: LE edema B/L++  	Neuro: Sedated  	Skin: Warm and dry             Acces: RIJ non tunneled HD catheter, +IABP    LABS/STUDIES  --------------------------------------------------------------------------------              8.2    11.33 >-----------<  77       [12-17-22 @ 00:12]              27.0     136  |  102  |  19  ----------------------------<  121      [12-17-22 @ 00:12]  3.7   |  22  |  1.41        Ca     8.5     [12-17-22 @ 00:12]      Mg     2.4     [12-17-22 @ 00:12]      Phos  3.5     [12-17-22 @ 00:12]    TPro  6.2  /  Alb  2.7  /  TBili  1.0  /  DBili  x   /  AST  36  /  ALT  14  /  AlkPhos  100  [12-17-22 @ 00:12]    PT/INR: PT 11.6 , INR 1.01       [12-17-22 @ 00:12]  PTT: 54.7       [12-17-22 @ 08:20]    CK 27      [12-16-22 @ 10:55]    Creatinine Trend:  SCr 1.41 [12-17 @ 00:12]  SCr 1.88 [12-16 @ 10:55]  SCr 1.41 [12-16 @ 00:23]  SCr 1.63 [12-15 @ 00:46]  SCr 1.43 [12-14 @ 00:34]    Urinalysis - [12-16-22 @ 04:28]      Color DK BROWN / Appearance Turbid / SG 1.029 / pH 6.0      Gluc Trace / Ketone Negative  / Bili Negative / Urobili 6 mg/dL       Blood Large / Protein 300 mg/dL / Leuk Est Large / Nitrite Negative       /  / Hyaline 85 / Gran  / Sq Epi  / Non Sq Epi 8 / Bacteria Moderate      TSH 2.84      [12-10-22 @ 15:55]       [facility] Division of Kidney Diseases & Hypertension  FOLLOW UP NOTE  [phone]--------------------------------------------------------------------------------  Chief Complaint:Acute pancreatitis without infection or necrosis        HPI:  63 y/o male with PMH of CABG, DM, HTN, HLD presenting as transfer from [place] for c/f STEMI. Course c/b gallstone pancreatitis leading to ARDS and shock & cardiac arrest now on VA ECMO. Had significant troponin elevation and his LVEF down to 8%. Pt was deemed to be too unstable to have an angiogram and potential PCI due to his co-morbidities & a new subdural bleed. Pt being seen for ERIC. SCr on arrival was 2.15; trended down to hector 1.6 on 11/25 and eventually increased to 3.95 11/28. Pt received IV diuretics as per CTU.    Pt initiated on CRRT on 12/5/22 to optimize volume status and electrolytes. Pt tolerating CRRT overnight with target net negative fluid balance. Pt underwent decannulation of ECMO on 12/8/22. Pt was restarted overnight on 12/9/22 for volume overload. Pt with clotting of CRRT circuit on 12/14 and was initiated on heparin with CRRT circuit.  Pt tolerating CRRT with no clotting of filter overnight. Pt underwent mitral clip OR (12/16/22).       Patient seen & examined today. Pt is intubated, unable to obtain ROS. s/p trach on 12/16 with FiO2 50%. CRRT without any issue with UF 50cc/hr. Pt is anuric with 12cc on bladder scan. Remains on milrinone gtt & IABP. CVP 6.           PAST HISTORY  --------------------------------------------------------------------------------  No significant changes to PMH, PSH, FHx, SHx, unless otherwise noted    ALLERGIES & MEDICATIONS  --------------------------------------------------------------------------------  Allergies    No Known Allergies    Intolerances      Standing Inpatient Medications  albuterol/ipratropium for Nebulization 3 milliLiter(s) Nebulizer every 6 hours  aMIOdarone    Tablet 200 milliGRAM(s) Oral daily  atorvastatin 40 milliGRAM(s) Oral at bedtime  chlorhexidine 0.12% Liquid 15 milliLiter(s) Oral Mucosa every 12 hours  CRRT Treatment    <Continuous>  dexMEDEtomidine Infusion 0.4 MICROgram(s)/kG/Hr IV Continuous <Continuous>  heparin  Infusion Syringe 300 Unit(s)/Hr CRRT <Continuous>  insulin regular Infusion 3 Unit(s)/Hr IV Continuous <Continuous>  milrinone Infusion 0.1 MICROgram(s)/kG/Min IV Continuous <Continuous>  multivitamin/minerals/iron Oral Solution (CENTRUM) 15 milliLiter(s) Oral daily  norepinephrine Infusion 0.05 MICROgram(s)/kG/Min IV Continuous <Continuous>  pantoprazole  Injectable 40 milliGRAM(s) IV Push daily  PrismaSATE Dialysate BK 0 / 3.5 5000 milliLiter(s) CRRT <Continuous>  PrismaSOL Filtration BGK 0 / 2.5 5000 milliLiter(s) CRRT <Continuous>  PrismaSOL Filtration BGK 4 / 2.5 5000 milliLiter(s) CRRT <Continuous>  propofol Infusion 20 MICROgram(s)/kG/Min IV Continuous <Continuous>  thiamine 100 milliGRAM(s) Enteral Tube daily  vasopressin Infusion 0.02 Unit(s)/Min IV Continuous <Continuous>    PRN Inpatient Medications      REVIEW OF SYSTEMS  --------------------------------------------------------------------------------  unable to obtain    VITALS/PHYSICAL EXAM  --------------------------------------------------------------------------------  T(C): 37.3 (12-17-22 @ 08:00), Max: 37.7 (12-16-22 @ 10:30)  HR: 115 (12-17-22 @ 09:26) (79 - 128)  BP: --  RR: 21 (12-17-22 @ 08:00) (10 - 38)  SpO2: 100% (12-17-22 @ 09:26) (98% - 100%)  Wt(kg): --  Height (cm): 172.7 (12-16-22 @ 16:25)  Weight (kg): 92.9 (12-16-22 @ 16:25)  BMI (kg/m2): 31.1 (12-16-22 @ 16:25)  BSA (m2): 2.06 (12-16-22 @ 16:25)      12-16-22 @ 07:01  -  12-17-22 @ 07:00  --------------------------------------------------------  IN: 1756.3 mL / OUT: 1468 mL / NET: 288.3 mL    12-17-22 @ 07:01  -  12-17-22 @ 09:48  --------------------------------------------------------  IN: 189.2 mL / OUT: 182 mL / NET: 7.2 mL      Physical Exam:  	Gen: ill appearing male, intubated   	HEENT: Anicteric  	Pulm:  trach+  	CV: S1S2+  	Abd: Soft, +BS       	Ext: LE edema B/L++  	Neuro: Sedated  	Skin: Warm and dry             Acces: RIJ non tunneled HD catheter, +IABP    LABS/STUDIES  --------------------------------------------------------------------------------              8.2    11.33 >-----------<  77       [12-17-22 @ 00:12]              27.0     136  |  102  |  19  ----------------------------<  121      [12-17-22 @ 00:12]  3.7   |  22  |  1.41        Ca     8.5     [12-17-22 @ 00:12]      Mg     2.4     [12-17-22 @ 00:12]      Phos  3.5     [12-17-22 @ 00:12]    TPro  6.2  /  Alb  2.7  /  TBili  1.0  /  DBili  x   /  AST  36  /  ALT  14  /  AlkPhos  100  [12-17-22 @ 00:12]    PT/INR: PT 11.6 , INR 1.01       [12-17-22 @ 00:12]  PTT: 54.7       [12-17-22 @ 08:20]    CK 27      [12-16-22 @ 10:55]    Creatinine Trend:  SCr 1.41 [12-17 @ 00:12]  SCr 1.88 [12-16 @ 10:55]  SCr 1.41 [12-16 @ 00:23]  SCr 1.63 [12-15 @ 00:46]  SCr 1.43 [12-14 @ 00:34]    Urinalysis - [12-16-22 @ 04:28]      Color DK BROWN / Appearance Turbid / SG 1.029 / pH 6.0      Gluc Trace / Ketone Negative  / Bili Negative / Urobili 6 mg/dL       Blood Large / Protein 300 mg/dL / Leuk Est Large / Nitrite Negative       /  / Hyaline 85 / Gran  / Sq Epi  / Non Sq Epi 8 / Bacteria Moderate      TSH 2.84      [12-10-22 @ 15:55]

## 2022-12-17 NOTE — PROGRESS NOTE ADULT - PROBLEM SELECTOR PLAN 1
- Keep the Trach site clean and dry.   - Remove Sutures on POD #7.   - Keep the Omniflex for a week to avoid the manipulation of the stoma.   - Elevate HOB.   - Gentle suction prn.   - Vent per RT.   - ENT will follow.   - Call with questions.

## 2022-12-17 NOTE — PROGRESS NOTE ADULT - ACP WITH SCRIBE
I personally performed the service described in the documentation  recorded by the scribe in my presence, and it accurately and completely records my words and actions.

## 2022-12-17 NOTE — PROGRESS NOTE ADULT - ASSESSMENT
63 YO M with a history of CAD s/p 4v CABG ~2019 in [facility], DM2 (A1c 7.3%), and HTN who initially presented to OSH with abdominal pain and n/v and found to have pancreatitis with lipase > 3000 though also with elevated troponins. CT abdomen revealed acute pancreatitis and his initial LVEF was 40-45%. He developed MSOF with hypoxic respiratory failure requiring intubation and ERIC and went into hypoxic PEA arrest requiring ACLS and due to persistent hypoxia and hypotension on pressors after ROSC was cannulated to VA ECMO (LFV, RFA, no anterograde perfusion catheter) on 11/25. Notably CTH that day revealed small subdural hemorrhage.     His post arrest TTE on 11/26 showed a signficantly worse LVEF of 5-10% with an AV that was only minimally opening. Since then he has had improvement in his LV function (now ~35-40%) and improved pulsatility while tolerating progressive slow ECMO weans. ECMO turndown (note in chart 11/30) was reassuring for ability to decannulate to IABP/inotropes. LHC 12/06 showed closure of his 3 vein grafts with graft to LAD patent though with distal native disease. IABP placed, ECMO successfully decannulated 12/08 (transfused 2u pRBCs during). His ARDS is improving. Currently on CVVH.    Previous cardiac studies:  LHC (12/06/22) - patent LIMA-LAD, RCA , LCx , aortogram w/ closure of 3 vein grafts, LVEDP 22 mm Hg, left-to-left and left-to-right collaterals  TTE (12/03/22) - mod-to-sev segmental LVSD (inferolateral, inferior, inferoseptal hypokinesis)    Hemodynamics:  12/14 IABP 1:1, milrinone 0.1, aDBP 105, aMAP83, lactate 0.8, tAX704.6%, CO 5.9, CI 2.7, SVR 1000,  1.0  12/13 IABP 1:1, milrinone 0.1, aDBP 101, aMAP 85, CVP 5, lactate 1.1, mVO2 71.2%, CO 7.7, CI 3.4,   12/12 IABP 1:1, aDBP 103, aMAP 84, CVP 7, lactate 1.3, mVO2 79.3%, CO 10.5, CI 5.0,   12/09 IABP 1:1, aDBP 126, aMAP 92, CVP 6, lactate 1.2  12/08 IABP 1:1, aDBP 120, aMAP 87, CVP 5  12/07 IABP 1:1 ECMO 3600 RPM 4.0 LPM, aMAP 90, aDBP 123, mVO2 66.8%, CVP 7, CO 6.8, CI 3.3 63 YO M with a history of CAD s/p 4v CABG ~2019 in [facility], DM2 (A1c 7.3%), and HTN who initially presented to OSH with abdominal pain and n/v and found to have pancreatitis with lipase > 3000 though also with elevated troponins. CT abdomen revealed acute pancreatitis and his initial LVEF was 40-45%. He developed MSOF with hypoxic respiratory failure requiring intubation and ERIC and went into hypoxic PEA arrest requiring ACLS and due to persistent hypoxia and hypotension on pressors after ROSC was cannulated to VA ECMO (LFV, RFA, no anterograde perfusion catheter) on 11/25. Notably CTH that day revealed small subdural hemorrhage.     His post arrest TTE on 11/26 showed a signficantly worse LVEF of 5-10% with an AV that was only minimally opening. Since then he has had improvement in his LV function (now ~35-40%) and improved pulsatility while tolerating progressive slow ECMO weans. ECMO turndown (note in chart 11/30) was reassuring for ability to decannulate to IABP/inotropes. LHC 12/06 showed closure of his 3 vein grafts with graft to LAD patent though with distal native disease. IABP placed, ECMO successfully decannulated 12/08 (transfused 2u pRBCs during). His ARDS is improving. Currently on CVVH.    Previous cardiac studies:  LHC (12/06/22) - patent LIMA-LAD, RCA , LCx , aortogram w/ closure of 3 vein grafts, LVEDP 22 mm Hg, left-to-left and left-to-right collaterals  TTE (12/03/22) - mod-to-sev segmental LVSD (inferolateral, inferior, inferoseptal hypokinesis)    Hemodynamics:  12/14 IABP 1:1, milrinone 0.1, aDBP 105, aMAP83, lactate 0.8, rSR930.6%, CO 5.9, CI 2.7, SVR 1000,  1.0  12/13 IABP 1:1, milrinone 0.1, aDBP 101, aMAP 85, CVP 5, lactate 1.1, mVO2 71.2%, CO 7.7, CI 3.4,   12/12 IABP 1:1, aDBP 103, aMAP 84, CVP 7, lactate 1.3, mVO2 79.3%, CO 10.5, CI 5.0,   12/09 IABP 1:1, aDBP 126, aMAP 92, CVP 6, lactate 1.2  12/08 IABP 1:1, aDBP 120, aMAP 87, CVP 5  12/07 IABP 1:1 ECMO 3600 RPM 4.0 LPM, aMAP 90, aDBP 123, mVO2 66.8%, CVP 7, CO 6.8, CI 3.3 61 YO M with a history of CAD s/p 4v CABG ~2019 in [facility], DM2 (A1c 7.3%), and HTN who initially presented to OSH with abdominal pain and n/v and found to have pancreatitis with lipase > 3000 though also with elevated troponins. CT abdomen revealed acute pancreatitis and his initial LVEF was 40-45%. He developed MSOF with hypoxic respiratory failure requiring intubation and ERIC and went into hypoxic PEA arrest requiring ACLS and due to persistent hypoxia and hypotension on pressors after ROSC was cannulated to VA ECMO (LFV, RFA, no anterograde perfusion catheter) on 11/25. Notably CTH that day revealed small subdural hemorrhage.     His post arrest TTE on 11/26 showed a signficantly worse LVEF of 5-10% with an AV that was only minimally opening. Since then he has had improvement in his LV function (now ~35-40%) and improved pulsatility while tolerating progressive slow ECMO weans. ECMO turndown (note in chart 11/30) was reassuring for ability to decannulate to IABP/inotropes. LHC 12/06 showed closure of his 3 vein grafts with graft to LAD patent though with distal native disease. IABP placed, ECMO successfully decannulated 12/08 (transfused 2u pRBCs during). His ARDS is improving. Currently on CVVH.    Previous cardiac studies:  LHC (12/06/22) - patent LIMA-LAD, RCA , LCx , aortogram w/ closure of 3 vein grafts, LVEDP 22 mm Hg, left-to-left and left-to-right collaterals  TTE (12/03/22) - mod-to-sev segmental LVSD (inferolateral, inferior, inferoseptal hypokinesis)    Hemodynamics:  12/14 IABP 1:1, milrinone 0.1, aDBP 105, aMAP83, lactate 0.8, dFE217.6%, CO 5.9, CI 2.7, SVR 1000,  1.0  12/13 IABP 1:1, milrinone 0.1, aDBP 101, aMAP 85, CVP 5, lactate 1.1, mVO2 71.2%, CO 7.7, CI 3.4,   12/12 IABP 1:1, aDBP 103, aMAP 84, CVP 7, lactate 1.3, mVO2 79.3%, CO 10.5, CI 5.0,   12/09 IABP 1:1, aDBP 126, aMAP 92, CVP 6, lactate 1.2  12/08 IABP 1:1, aDBP 120, aMAP 87, CVP 5  12/07 IABP 1:1 ECMO 3600 RPM 4.0 LPM, aMAP 90, aDBP 123, mVO2 66.8%, CVP 7, CO 6.8, CI 3.3

## 2022-12-17 NOTE — PROGRESS NOTE ADULT - SUBJECTIVE AND OBJECTIVE BOX
ENT ISSUE/POD: s/p #7 Proximal XLT tracheostomy POD#1    HPI: 61 YO M with PMH of CABG, DM, HTN, HLD presenting as transfer from [place] for STEMI. Course c/b gallstone pancreatitis leading to ARDS, shock, cardiac arrest then placed on [facility] ECMO. Pt underwent ECMO decannulation. ENT called to evaluate for tracheostomy.  Now S/P #7 Proximal XLT Tracheostomy POD # 1. In OR Surgicel placed around the stoma. Doing well on the ventilator.        PAST MEDICAL & SURGICAL HISTORY:    Allergies    No Known Allergies    Intolerances      MEDICATIONS  (STANDING):  albuterol/ipratropium for Nebulization 3 milliLiter(s) Nebulizer every 6 hours  aMIOdarone    Tablet 200 milliGRAM(s) Oral daily  atorvastatin 40 milliGRAM(s) Oral at bedtime  chlorhexidine 0.12% Liquid 15 milliLiter(s) Oral Mucosa every 12 hours  CRRT Treatment    <Continuous>  dexMEDEtomidine Infusion 0.4 MICROgram(s)/kG/Hr (9.29 mL/Hr) IV Continuous <Continuous>  heparin  Infusion Syringe 300 Unit(s)/Hr (0.6 mL/Hr) CRRT <Continuous>  insulin regular Infusion 3 Unit(s)/Hr (3 mL/Hr) IV Continuous <Continuous>  milrinone Infusion 0.1 MICROgram(s)/kG/Min (2.79 mL/Hr) IV Continuous <Continuous>  multivitamin/minerals/iron Oral Solution (CENTRUM) 15 milliLiter(s) Oral daily  norepinephrine Infusion 0.05 MICROgram(s)/kG/Min (8.71 mL/Hr) IV Continuous <Continuous>  pantoprazole  Injectable 40 milliGRAM(s) IV Push daily  Phoxillum Filtration BK 4 / 2.5 5000 milliLiter(s) (2400 mL/Hr) CRRT <Continuous>  Phoxillum Filtration BK 4 / 2.5 5000 milliLiter(s) (800 mL/Hr) CRRT <Continuous>  PrismaSOL Filtration BGK 4 / 2.5 5000 milliLiter(s) (200 mL/Hr) CRRT <Continuous>  propofol Infusion 20 MICROgram(s)/kG/Min (11.1 mL/Hr) IV Continuous <Continuous>  thiamine 100 milliGRAM(s) Enteral Tube daily  vasopressin Infusion 0.02 Unit(s)/Min (3 mL/Hr) IV Continuous <Continuous>    MEDICATIONS  (PRN):      Social History: see consult    Family history: see consult    ROS:   ENT: all negative except as noted in HPI   Pulm: denies SOB, cough, hemoptysis  Neuro: denies numbness/tingling, loss of sensation  Endo: denies heat/cold intolerance, excessive sweating      Vital Signs Last 24 Hrs  T(C): 37.3 (17 Dec 2022 08:00), Max: 37.3 (17 Dec 2022 08:00)  T(F): 99.1 (17 Dec 2022 08:00), Max: 99.1 (17 Dec 2022 08:00)  HR: 81 (17 Dec 2022 11:17) (79 - 120)  BP: --  BP(mean): 76 (16 Dec 2022 16:25) (76 - 76)  RR: 12 (17 Dec 2022 11:00) (10 - 38)  SpO2: 100% (17 Dec 2022 11:17) (91% - 100%)    Parameters below as of 17 Dec 2022 11:17  Patient On (Oxygen Delivery Method): ventilator                              8.2    11.33 )-----------( 77       ( 17 Dec 2022 00:12 )             27.0    12-17    136  |  102  |  19  ----------------------------<  121<H>  3.7   |  22  |  1.41<H>    Ca    8.5      17 Dec 2022 00:12  Phos  3.5     12-17  Mg     2.4     12-17    TPro  6.2  /  Alb  2.7<L>  /  TBili  1.0  /  DBili  x   /  AST  36  /  ALT  14  /  AlkPhos  100  12-17   PT/INR - ( 17 Dec 2022 00:12 )   PT: 11.6 sec;   INR: 1.01 ratio         PTT - ( 17 Dec 2022 08:20 )  PTT:54.7 sec    PHYSICAL EXAM:  Gen: NAD, on vent  Skin: No rashes, bruises, or lesions  Head: Normocephalic, Atraumatic  Face: no edema, erythema, or fluctuance. Parotid glands soft without mass  Eyes: no scleral injection  Nose: Nares bilaterally patent, no discharge  Mouth: No Stridor / Drooling / Trismus.  Mucosa moist, tongue/uvula midline, oropharynx clear  Neck: #7 Proximal XLT Trach  secured with sutures x 4 and velcro strap. Surgicel around inferior and superior stoma removed.  No hematoma or creptius. Flat, supple, no lymphadenopathy, trachea midline, no masses  Lymphatic: No lymphadenopathy  Resp: on vent   Neuro: facial nerve intact, no facial droop         ENT ISSUE/POD: s/p #7 Proximal XLT tracheostomy POD#1    HPI: 61 YO M with PMH of CABG, DM, HTN, HLD presenting as transfer from [place] for STEMI. Course c/b gallstone pancreatitis leading to ARDS, shock, cardiac arrest then placed on [facility] ECMO. Pt underwent ECMO decannulation. ENT called to evaluate for tracheostomy.  Now S/P #7 Proximal XLT Tracheostomy POD # 1. In OR Surgicel placed around the stoma. Doing well on the ventilator.        PAST MEDICAL & SURGICAL HISTORY:    Allergies    No Known Allergies    Intolerances      MEDICATIONS  (STANDING):  albuterol/ipratropium for Nebulization 3 milliLiter(s) Nebulizer every 6 hours  aMIOdarone    Tablet 200 milliGRAM(s) Oral daily  atorvastatin 40 milliGRAM(s) Oral at bedtime  chlorhexidine 0.12% Liquid 15 milliLiter(s) Oral Mucosa every 12 hours  CRRT Treatment    <Continuous>  dexMEDEtomidine Infusion 0.4 MICROgram(s)/kG/Hr (9.29 mL/Hr) IV Continuous <Continuous>  heparin  Infusion Syringe 300 Unit(s)/Hr (0.6 mL/Hr) CRRT <Continuous>  insulin regular Infusion 3 Unit(s)/Hr (3 mL/Hr) IV Continuous <Continuous>  milrinone Infusion 0.1 MICROgram(s)/kG/Min (2.79 mL/Hr) IV Continuous <Continuous>  multivitamin/minerals/iron Oral Solution (CENTRUM) 15 milliLiter(s) Oral daily  norepinephrine Infusion 0.05 MICROgram(s)/kG/Min (8.71 mL/Hr) IV Continuous <Continuous>  pantoprazole  Injectable 40 milliGRAM(s) IV Push daily  Phoxillum Filtration BK 4 / 2.5 5000 milliLiter(s) (2400 mL/Hr) CRRT <Continuous>  Phoxillum Filtration BK 4 / 2.5 5000 milliLiter(s) (800 mL/Hr) CRRT <Continuous>  PrismaSOL Filtration BGK 4 / 2.5 5000 milliLiter(s) (200 mL/Hr) CRRT <Continuous>  propofol Infusion 20 MICROgram(s)/kG/Min (11.1 mL/Hr) IV Continuous <Continuous>  thiamine 100 milliGRAM(s) Enteral Tube daily  vasopressin Infusion 0.02 Unit(s)/Min (3 mL/Hr) IV Continuous <Continuous>    MEDICATIONS  (PRN):      Social History: see consult    Family history: see consult    ROS:   ENT: all negative except as noted in HPI   Pulm: denies SOB, cough, hemoptysis  Neuro: denies numbness/tingling, loss of sensation  Endo: denies heat/cold intolerance, excessive sweating      Vital Signs Last 24 Hrs  T(C): 37.3 (17 Dec 2022 08:00), Max: 37.3 (17 Dec 2022 08:00)  T(F): 99.1 (17 Dec 2022 08:00), Max: 99.1 (17 Dec 2022 08:00)  HR: 81 (17 Dec 2022 11:17) (79 - 120)  BP: --  BP(mean): 76 (16 Dec 2022 16:25) (76 - 76)  RR: 12 (17 Dec 2022 11:00) (10 - 38)  SpO2: 100% (17 Dec 2022 11:17) (91% - 100%)    Parameters below as of 17 Dec 2022 11:17  Patient On (Oxygen Delivery Method): ventilator                              8.2    11.33 )-----------( 77       ( 17 Dec 2022 00:12 )             27.0    12-17    136  |  102  |  19  ----------------------------<  121<H>  3.7   |  22  |  1.41<H>    Ca    8.5      17 Dec 2022 00:12  Phos  3.5     12-17  Mg     2.4     12-17    TPro  6.2  /  Alb  2.7<L>  /  TBili  1.0  /  DBili  x   /  AST  36  /  ALT  14  /  AlkPhos  100  12-17   PT/INR - ( 17 Dec 2022 00:12 )   PT: 11.6 sec;   INR: 1.01 ratio         PTT - ( 17 Dec 2022 08:20 )  PTT:54.7 sec    PHYSICAL EXAM:  Gen: NAD, on vent  Skin: No rashes, bruises, or lesions  Head: Normocephalic, Atraumatic  Face: no edema, erythema, or fluctuance. Parotid glands soft without mass  Eyes: no scleral injection  Nose: Nares bilaterally patent, no discharge  Mouth: No Stridor / Drooling / Trismus.  Mucosa moist, tongue/uvula midline, oropharynx clear  Neck: #7 Proximal XLT Trach  secured with sutures x 4 and velcro strap. Surgicel around inferior and superior stoma removed.  No hematoma or creptius. Flat, supple, no lymphadenopathy, trachea midline, no masses  Lymphatic: No lymphadenopathy  Resp: on vent   Neuro: facial nerve intact, no facial droop         ENT ISSUE/POD: s/p #7 Proximal XLT tracheostomy POD#1    HPI: 61 YO M with PMH of CABG, DM, HTN, HLD presenting as transfer from [place] for STEMI. Course c/b gallstone pancreatitis leading to ARDS, shock, cardiac arrest then placed on [facility] ECMO. Pt underwent ECMO decannulation. ENT called to evaluate for tracheostomy.  Now S/P #7 Proximal XLT Tracheostomy POD # 1. In OR Surgicel placed around the stoma. Doing well on the ventilator.        PAST MEDICAL & SURGICAL HISTORY:    Allergies    No Known Allergies    Intolerances      MEDICATIONS  (STANDING):  albuterol/ipratropium for Nebulization 3 milliLiter(s) Nebulizer every 6 hours  aMIOdarone    Tablet 200 milliGRAM(s) Oral daily  atorvastatin 40 milliGRAM(s) Oral at bedtime  chlorhexidine 0.12% Liquid 15 milliLiter(s) Oral Mucosa every 12 hours  CRRT Treatment    <Continuous>  dexMEDEtomidine Infusion 0.4 MICROgram(s)/kG/Hr (9.29 mL/Hr) IV Continuous <Continuous>  heparin  Infusion Syringe 300 Unit(s)/Hr (0.6 mL/Hr) CRRT <Continuous>  insulin regular Infusion 3 Unit(s)/Hr (3 mL/Hr) IV Continuous <Continuous>  milrinone Infusion 0.1 MICROgram(s)/kG/Min (2.79 mL/Hr) IV Continuous <Continuous>  multivitamin/minerals/iron Oral Solution (CENTRUM) 15 milliLiter(s) Oral daily  norepinephrine Infusion 0.05 MICROgram(s)/kG/Min (8.71 mL/Hr) IV Continuous <Continuous>  pantoprazole  Injectable 40 milliGRAM(s) IV Push daily  Phoxillum Filtration BK 4 / 2.5 5000 milliLiter(s) (2400 mL/Hr) CRRT <Continuous>  Phoxillum Filtration BK 4 / 2.5 5000 milliLiter(s) (800 mL/Hr) CRRT <Continuous>  PrismaSOL Filtration BGK 4 / 2.5 5000 milliLiter(s) (200 mL/Hr) CRRT <Continuous>  propofol Infusion 20 MICROgram(s)/kG/Min (11.1 mL/Hr) IV Continuous <Continuous>  thiamine 100 milliGRAM(s) Enteral Tube daily  vasopressin Infusion 0.02 Unit(s)/Min (3 mL/Hr) IV Continuous <Continuous>    MEDICATIONS  (PRN):      Social History: see consult    Family history: see consult    ROS:   ENT: all negative except as noted in HPI   Pulm: denies SOB, cough, hemoptysis  Neuro: denies numbness/tingling, loss of sensation  Endo: denies heat/cold intolerance, excessive sweating      Vital Signs Last 24 Hrs  T(C): 37.3 (17 Dec 2022 08:00), Max: 37.3 (17 Dec 2022 08:00)  T(F): 99.1 (17 Dec 2022 08:00), Max: 99.1 (17 Dec 2022 08:00)  HR: 81 (17 Dec 2022 11:17) (79 - 120)  BP: --  BP(mean): 76 (16 Dec 2022 16:25) (76 - 76)  RR: 12 (17 Dec 2022 11:00) (10 - 38)  SpO2: 100% (17 Dec 2022 11:17) (91% - 100%)    Parameters below as of 17 Dec 2022 11:17  Patient On (Oxygen Delivery Method): ventilator                              8.2    11.33 )-----------( 77       ( 17 Dec 2022 00:12 )             27.0    12-17    136  |  102  |  19  ----------------------------<  121<H>  3.7   |  22  |  1.41<H>    Ca    8.5      17 Dec 2022 00:12  Phos  3.5     12-17  Mg     2.4     12-17    TPro  6.2  /  Alb  2.7<L>  /  TBili  1.0  /  DBili  x   /  AST  36  /  ALT  14  /  AlkPhos  100  12-17   PT/INR - ( 17 Dec 2022 00:12 )   PT: 11.6 sec;   INR: 1.01 ratio         PTT - ( 17 Dec 2022 08:20 )  PTT:54.7 sec    PHYSICAL EXAM:  Gen: NAD, on vent  Skin: No rashes, bruises, or lesions  Head: Normocephalic, Atraumatic  Face: no edema, erythema, or fluctuance. Parotid glands soft without mass  Eyes: no scleral injection  Nose: Nares bilaterally patent, no discharge  Mouth: No Stridor / Drooling / Trismus.  Mucosa moist, tongue/uvula midline, oropharynx clear  Neck: #7 Proximal XLT Trach  secured with sutures x 4 and velcro strap. Surgicel around inferior and superior stoma removed.  No hematoma or creptius. Flat, supple, no lymphadenopathy, trachea midline, no masses  Lymphatic: No lymphadenopathy  Resp: on vent   Neuro: facial nerve intact, no facial droop         ENT ISSUE/POD: s/p #7 Proximal XLT tracheostomy POD#1    HPI: 63 YO M with PMH of CABG, DM, HTN, HLD presenting as transfer from [place] for STEMI. Course c/b gallstone pancreatitis leading to ARDS, shock, cardiac arrest then placed on VA ECMO. Pt underwent ECMO decannulation. ENT called to evaluate for tracheostomy.  Now S/P #7 Proximal XLT Tracheostomy POD # 1. In OR Surgicel placed around the stoma. Doing well on the ventilator.        PAST MEDICAL & SURGICAL HISTORY:    Allergies    No Known Allergies    Intolerances      MEDICATIONS  (STANDING):  albuterol/ipratropium for Nebulization 3 milliLiter(s) Nebulizer every 6 hours  aMIOdarone    Tablet 200 milliGRAM(s) Oral daily  atorvastatin 40 milliGRAM(s) Oral at bedtime  chlorhexidine 0.12% Liquid 15 milliLiter(s) Oral Mucosa every 12 hours  CRRT Treatment    <Continuous>  dexMEDEtomidine Infusion 0.4 MICROgram(s)/kG/Hr (9.29 mL/Hr) IV Continuous <Continuous>  heparin  Infusion Syringe 300 Unit(s)/Hr (0.6 mL/Hr) CRRT <Continuous>  insulin regular Infusion 3 Unit(s)/Hr (3 mL/Hr) IV Continuous <Continuous>  milrinone Infusion 0.1 MICROgram(s)/kG/Min (2.79 mL/Hr) IV Continuous <Continuous>  multivitamin/minerals/iron Oral Solution (CENTRUM) 15 milliLiter(s) Oral daily  norepinephrine Infusion 0.05 MICROgram(s)/kG/Min (8.71 mL/Hr) IV Continuous <Continuous>  pantoprazole  Injectable 40 milliGRAM(s) IV Push daily  Phoxillum Filtration BK 4 / 2.5 5000 milliLiter(s) (2400 mL/Hr) CRRT <Continuous>  Phoxillum Filtration BK 4 / 2.5 5000 milliLiter(s) (800 mL/Hr) CRRT <Continuous>  PrismaSOL Filtration BGK 4 / 2.5 5000 milliLiter(s) (200 mL/Hr) CRRT <Continuous>  propofol Infusion 20 MICROgram(s)/kG/Min (11.1 mL/Hr) IV Continuous <Continuous>  thiamine 100 milliGRAM(s) Enteral Tube daily  vasopressin Infusion 0.02 Unit(s)/Min (3 mL/Hr) IV Continuous <Continuous>    MEDICATIONS  (PRN):      Social History: see consult    Family history: see consult    ROS:   ENT: all negative except as noted in HPI   Pulm: denies SOB, cough, hemoptysis  Neuro: denies numbness/tingling, loss of sensation  Endo: denies heat/cold intolerance, excessive sweating      Vital Signs Last 24 Hrs  T(C): 37.3 (17 Dec 2022 08:00), Max: 37.3 (17 Dec 2022 08:00)  T(F): 99.1 (17 Dec 2022 08:00), Max: 99.1 (17 Dec 2022 08:00)  HR: 81 (17 Dec 2022 11:17) (79 - 120)  BP: --  BP(mean): 76 (16 Dec 2022 16:25) (76 - 76)  RR: 12 (17 Dec 2022 11:00) (10 - 38)  SpO2: 100% (17 Dec 2022 11:17) (91% - 100%)    Parameters below as of 17 Dec 2022 11:17  Patient On (Oxygen Delivery Method): ventilator                              8.2    11.33 )-----------( 77       ( 17 Dec 2022 00:12 )             27.0    12-17    136  |  102  |  19  ----------------------------<  121<H>  3.7   |  22  |  1.41<H>    Ca    8.5      17 Dec 2022 00:12  Phos  3.5     12-17  Mg     2.4     12-17    TPro  6.2  /  Alb  2.7<L>  /  TBili  1.0  /  DBili  x   /  AST  36  /  ALT  14  /  AlkPhos  100  12-17   PT/INR - ( 17 Dec 2022 00:12 )   PT: 11.6 sec;   INR: 1.01 ratio         PTT - ( 17 Dec 2022 08:20 )  PTT:54.7 sec    PHYSICAL EXAM:  Gen: NAD, on vent  Skin: No rashes, bruises, or lesions  Head: Normocephalic, Atraumatic  Face: no edema, erythema, or fluctuance. Parotid glands soft without mass  Eyes: no scleral injection  Nose: Nares bilaterally patent, no discharge  Mouth: No Stridor / Drooling / Trismus.  Mucosa moist, tongue/uvula midline, oropharynx clear  Neck: #7 Proximal XLT Trach  secured with sutures x 4 and umbilical tie. Surgicel around inferior and superior stoma removed.  No hematoma or creptius. Flat, supple, no lymphadenopathy, trachea midline, no masses  Lymphatic: No lymphadenopathy  Resp: on vent   Neuro: facial nerve intact, no facial droop         ENT ISSUE/POD: s/p #7 Proximal XLT tracheostomy POD#1    HPI: 63 YO M with PMH of CABG, DM, HTN, HLD presenting as transfer from [place] for STEMI. Course c/b gallstone pancreatitis leading to ARDS, shock, cardiac arrest then placed on VA ECMO. Pt underwent ECMO decannulation. ENT called to evaluate for tracheostomy.  Now S/P #7 Proximal XLT Tracheostomy POD # 1. In OR Surgicel placed around the stoma. Doing well on the ventilator.        PAST MEDICAL & SURGICAL HISTORY:    Allergies    No Known Allergies    Intolerances      MEDICATIONS  (STANDING):  albuterol/ipratropium for Nebulization 3 milliLiter(s) Nebulizer every 6 hours  aMIOdarone    Tablet 200 milliGRAM(s) Oral daily  atorvastatin 40 milliGRAM(s) Oral at bedtime  chlorhexidine 0.12% Liquid 15 milliLiter(s) Oral Mucosa every 12 hours  CRRT Treatment    <Continuous>  dexMEDEtomidine Infusion 0.4 MICROgram(s)/kG/Hr (9.29 mL/Hr) IV Continuous <Continuous>  heparin  Infusion Syringe 300 Unit(s)/Hr (0.6 mL/Hr) CRRT <Continuous>  insulin regular Infusion 3 Unit(s)/Hr (3 mL/Hr) IV Continuous <Continuous>  milrinone Infusion 0.1 MICROgram(s)/kG/Min (2.79 mL/Hr) IV Continuous <Continuous>  multivitamin/minerals/iron Oral Solution (CENTRUM) 15 milliLiter(s) Oral daily  norepinephrine Infusion 0.05 MICROgram(s)/kG/Min (8.71 mL/Hr) IV Continuous <Continuous>  pantoprazole  Injectable 40 milliGRAM(s) IV Push daily  Phoxillum Filtration BK 4 / 2.5 5000 milliLiter(s) (2400 mL/Hr) CRRT <Continuous>  Phoxillum Filtration BK 4 / 2.5 5000 milliLiter(s) (800 mL/Hr) CRRT <Continuous>  PrismaSOL Filtration BGK 4 / 2.5 5000 milliLiter(s) (200 mL/Hr) CRRT <Continuous>  propofol Infusion 20 MICROgram(s)/kG/Min (11.1 mL/Hr) IV Continuous <Continuous>  thiamine 100 milliGRAM(s) Enteral Tube daily  vasopressin Infusion 0.02 Unit(s)/Min (3 mL/Hr) IV Continuous <Continuous>    MEDICATIONS  (PRN):      Social History: see consult    Family history: see consult    ROS:   ENT: all negative except as noted in HPI   Pulm: denies SOB, cough, hemoptysis  Neuro: denies numbness/tingling, loss of sensation  Endo: denies heat/cold intolerance, excessive sweating      Vital Signs Last 24 Hrs  T(C): 37.3 (17 Dec 2022 08:00), Max: 37.3 (17 Dec 2022 08:00)  T(F): 99.1 (17 Dec 2022 08:00), Max: 99.1 (17 Dec 2022 08:00)  HR: 81 (17 Dec 2022 11:17) (79 - 120)  BP: --  BP(mean): 76 (16 Dec 2022 16:25) (76 - 76)  RR: 12 (17 Dec 2022 11:00) (10 - 38)  SpO2: 100% (17 Dec 2022 11:17) (91% - 100%)    Parameters below as of 17 Dec 2022 11:17  Patient On (Oxygen Delivery Method): ventilator                              8.2    11.33 )-----------( 77       ( 17 Dec 2022 00:12 )             27.0    12-17    136  |  102  |  19  ----------------------------<  121<H>  3.7   |  22  |  1.41<H>    Ca    8.5      17 Dec 2022 00:12  Phos  3.5     12-17  Mg     2.4     12-17    TPro  6.2  /  Alb  2.7<L>  /  TBili  1.0  /  DBili  x   /  AST  36  /  ALT  14  /  AlkPhos  100  12-17   PT/INR - ( 17 Dec 2022 00:12 )   PT: 11.6 sec;   INR: 1.01 ratio         PTT - ( 17 Dec 2022 08:20 )  PTT:54.7 sec    PHYSICAL EXAM:  Gen: NAD, on vent  Skin: No rashes, bruises, or lesions  Head: Normocephalic, Atraumatic  Face: no edema, erythema, or fluctuance. Parotid glands soft without mass  Eyes: no scleral injection  Nose: Nares bilaterally patent, no discharge  Mouth: No Stridor / Drooling / Trismus.  Mucosa moist, tongue/uvula midline, oropharynx clear  Neck: #7 Proximal XLT Trach  secured with sutures x 4 and umbilical tie. Surgicel around inferior and superior stoma removed.  No hematoma or creptius. Flat, supple, no lymphadenopathy, trachea midline, no masses  Lymphatic: No lymphadenopathy  Resp: on vent   Neuro: facial nerve intact, no facial droop         ENT ISSUE/POD: s/p #7 Proximal XLT tracheostomy POD#1    HPI: 61 YO M with PMH of CABG, DM, HTN, HLD presenting as transfer from [place] for STEMI. Course c/b gallstone pancreatitis leading to ARDS, shock, cardiac arrest then placed on VA ECMO. Pt underwent ECMO decannulation. ENT called to evaluate for tracheostomy.  Now S/P #7 Proximal XLT Tracheostomy POD # 1. In OR Surgicel placed around the stoma. Doing well on the ventilator.        PAST MEDICAL & SURGICAL HISTORY:    Allergies    No Known Allergies    Intolerances      MEDICATIONS  (STANDING):  albuterol/ipratropium for Nebulization 3 milliLiter(s) Nebulizer every 6 hours  aMIOdarone    Tablet 200 milliGRAM(s) Oral daily  atorvastatin 40 milliGRAM(s) Oral at bedtime  chlorhexidine 0.12% Liquid 15 milliLiter(s) Oral Mucosa every 12 hours  CRRT Treatment    <Continuous>  dexMEDEtomidine Infusion 0.4 MICROgram(s)/kG/Hr (9.29 mL/Hr) IV Continuous <Continuous>  heparin  Infusion Syringe 300 Unit(s)/Hr (0.6 mL/Hr) CRRT <Continuous>  insulin regular Infusion 3 Unit(s)/Hr (3 mL/Hr) IV Continuous <Continuous>  milrinone Infusion 0.1 MICROgram(s)/kG/Min (2.79 mL/Hr) IV Continuous <Continuous>  multivitamin/minerals/iron Oral Solution (CENTRUM) 15 milliLiter(s) Oral daily  norepinephrine Infusion 0.05 MICROgram(s)/kG/Min (8.71 mL/Hr) IV Continuous <Continuous>  pantoprazole  Injectable 40 milliGRAM(s) IV Push daily  Phoxillum Filtration BK 4 / 2.5 5000 milliLiter(s) (2400 mL/Hr) CRRT <Continuous>  Phoxillum Filtration BK 4 / 2.5 5000 milliLiter(s) (800 mL/Hr) CRRT <Continuous>  PrismaSOL Filtration BGK 4 / 2.5 5000 milliLiter(s) (200 mL/Hr) CRRT <Continuous>  propofol Infusion 20 MICROgram(s)/kG/Min (11.1 mL/Hr) IV Continuous <Continuous>  thiamine 100 milliGRAM(s) Enteral Tube daily  vasopressin Infusion 0.02 Unit(s)/Min (3 mL/Hr) IV Continuous <Continuous>    MEDICATIONS  (PRN):      Social History: see consult    Family history: see consult    ROS:   ENT: all negative except as noted in HPI   Pulm: denies SOB, cough, hemoptysis  Neuro: denies numbness/tingling, loss of sensation  Endo: denies heat/cold intolerance, excessive sweating      Vital Signs Last 24 Hrs  T(C): 37.3 (17 Dec 2022 08:00), Max: 37.3 (17 Dec 2022 08:00)  T(F): 99.1 (17 Dec 2022 08:00), Max: 99.1 (17 Dec 2022 08:00)  HR: 81 (17 Dec 2022 11:17) (79 - 120)  BP: --  BP(mean): 76 (16 Dec 2022 16:25) (76 - 76)  RR: 12 (17 Dec 2022 11:00) (10 - 38)  SpO2: 100% (17 Dec 2022 11:17) (91% - 100%)    Parameters below as of 17 Dec 2022 11:17  Patient On (Oxygen Delivery Method): ventilator                              8.2    11.33 )-----------( 77       ( 17 Dec 2022 00:12 )             27.0    12-17    136  |  102  |  19  ----------------------------<  121<H>  3.7   |  22  |  1.41<H>    Ca    8.5      17 Dec 2022 00:12  Phos  3.5     12-17  Mg     2.4     12-17    TPro  6.2  /  Alb  2.7<L>  /  TBili  1.0  /  DBili  x   /  AST  36  /  ALT  14  /  AlkPhos  100  12-17   PT/INR - ( 17 Dec 2022 00:12 )   PT: 11.6 sec;   INR: 1.01 ratio         PTT - ( 17 Dec 2022 08:20 )  PTT:54.7 sec    PHYSICAL EXAM:  Gen: NAD, on vent  Skin: No rashes, bruises, or lesions  Head: Normocephalic, Atraumatic  Face: no edema, erythema, or fluctuance. Parotid glands soft without mass  Eyes: no scleral injection  Nose: Nares bilaterally patent, no discharge  Mouth: No Stridor / Drooling / Trismus.  Mucosa moist, tongue/uvula midline, oropharynx clear  Neck: #7 Proximal XLT Trach  secured with sutures x 4 and umbilical tie. Surgicel around inferior and superior stoma removed.  No hematoma or creptius. Flat, supple, no lymphadenopathy, trachea midline, no masses  Lymphatic: No lymphadenopathy  Resp: on vent   Neuro: facial nerve intact, no facial droop

## 2022-12-17 NOTE — PROGRESS NOTE ADULT - PROBLEM SELECTOR PLAN 2
- troponin peaked at > 47790   - [facility] 12/06 with IABP placement revealed that 3 grafts are closed w/ distal native disease from remaining LAD graft  - continue statin  - would start ASA when okay with surgical team. - troponin peaked at > 18460   - [facility] 12/06 with IABP placement revealed that 3 grafts are closed w/ distal native disease from remaining LAD graft  - continue statin  - would start ASA when okay with surgical team. - troponin peaked at > 58338   - [facility] 12/06 with IABP placement revealed that 3 grafts are closed w/ distal native disease from remaining LAD graft  - continue statin  - would start ASA when okay with surgical team.

## 2022-12-18 LAB
ALBUMIN SERPL ELPH-MCNC: 2.9 G/DL — LOW (ref 3.3–5)
ALP SERPL-CCNC: 98 U/L — SIGNIFICANT CHANGE UP (ref 40–120)
ALT FLD-CCNC: 12 U/L — SIGNIFICANT CHANGE UP (ref 10–45)
AMYLASE P1 CFR SERPL: 176 U/L — HIGH (ref 25–125)
ANION GAP SERPL CALC-SCNC: 13 MMOL/L — SIGNIFICANT CHANGE UP (ref 5–17)
APTT BLD: 27.2 SEC — LOW (ref 27.5–35.5)
AST SERPL-CCNC: 31 U/L — SIGNIFICANT CHANGE UP (ref 10–40)
BASE EXCESS BLDV CALC-SCNC: -0.6 MMOL/L — SIGNIFICANT CHANGE UP (ref -2–3)
BASE EXCESS BLDV CALC-SCNC: -3.3 MMOL/L — LOW (ref -2–3)
BILIRUB SERPL-MCNC: 0.5 MG/DL — SIGNIFICANT CHANGE UP (ref 0.2–1.2)
BUN SERPL-MCNC: 25 MG/DL — HIGH (ref 7–23)
CA-I SERPL-SCNC: 1.14 MMOL/L — LOW (ref 1.15–1.33)
CALCIUM SERPL-MCNC: 8 MG/DL — LOW (ref 8.4–10.5)
CHLORIDE BLDV-SCNC: 101 MMOL/L — SIGNIFICANT CHANGE UP (ref 96–108)
CHLORIDE SERPL-SCNC: 101 MMOL/L — SIGNIFICANT CHANGE UP (ref 96–108)
CO2 BLDV-SCNC: 24 MMOL/L — SIGNIFICANT CHANGE UP (ref 22–26)
CO2 BLDV-SCNC: 26 MMOL/L — SIGNIFICANT CHANGE UP (ref 22–26)
CO2 SERPL-SCNC: 20 MMOL/L — LOW (ref 22–31)
CREAT SERPL-MCNC: 1.75 MG/DL — HIGH (ref 0.5–1.3)
EGFR: 43 ML/MIN/1.73M2 — LOW
FIBRINOGEN PPP-MCNC: 333 MG/DL — SIGNIFICANT CHANGE UP (ref 200–445)
GAS PNL BLDA: SIGNIFICANT CHANGE UP
GAS PNL BLDV: 134 MMOL/L — LOW (ref 136–145)
GAS PNL BLDV: SIGNIFICANT CHANGE UP
GLUCOSE BLDC GLUCOMTR-MCNC: 100 MG/DL — HIGH (ref 70–99)
GLUCOSE BLDC GLUCOMTR-MCNC: 103 MG/DL — HIGH (ref 70–99)
GLUCOSE BLDC GLUCOMTR-MCNC: 105 MG/DL — HIGH (ref 70–99)
GLUCOSE BLDC GLUCOMTR-MCNC: 107 MG/DL — HIGH (ref 70–99)
GLUCOSE BLDC GLUCOMTR-MCNC: 108 MG/DL — HIGH (ref 70–99)
GLUCOSE BLDC GLUCOMTR-MCNC: 112 MG/DL — HIGH (ref 70–99)
GLUCOSE BLDC GLUCOMTR-MCNC: 116 MG/DL — HIGH (ref 70–99)
GLUCOSE BLDC GLUCOMTR-MCNC: 119 MG/DL — HIGH (ref 70–99)
GLUCOSE BLDC GLUCOMTR-MCNC: 120 MG/DL — HIGH (ref 70–99)
GLUCOSE BLDC GLUCOMTR-MCNC: 124 MG/DL — HIGH (ref 70–99)
GLUCOSE BLDC GLUCOMTR-MCNC: 132 MG/DL — HIGH (ref 70–99)
GLUCOSE BLDC GLUCOMTR-MCNC: 137 MG/DL — HIGH (ref 70–99)
GLUCOSE BLDC GLUCOMTR-MCNC: 145 MG/DL — HIGH (ref 70–99)
GLUCOSE BLDC GLUCOMTR-MCNC: 151 MG/DL — HIGH (ref 70–99)
GLUCOSE BLDC GLUCOMTR-MCNC: 152 MG/DL — HIGH (ref 70–99)
GLUCOSE BLDC GLUCOMTR-MCNC: 160 MG/DL — HIGH (ref 70–99)
GLUCOSE BLDC GLUCOMTR-MCNC: 97 MG/DL — SIGNIFICANT CHANGE UP (ref 70–99)
GLUCOSE BLDV-MCNC: 102 MG/DL — HIGH (ref 70–99)
GLUCOSE SERPL-MCNC: 106 MG/DL — HIGH (ref 70–99)
HAPTOGLOB SERPL-MCNC: 20 MG/DL — LOW (ref 34–200)
HCO3 BLDV-SCNC: 23 MMOL/L — SIGNIFICANT CHANGE UP (ref 22–29)
HCO3 BLDV-SCNC: 25 MMOL/L — SIGNIFICANT CHANGE UP (ref 22–29)
HCT VFR BLD CALC: 28.2 % — LOW (ref 39–50)
HCT VFR BLDA CALC: 26 % — LOW (ref 39–51)
HGB BLD CALC-MCNC: 8.7 G/DL — LOW (ref 12.6–17.4)
HGB BLD-MCNC: 8.6 G/DL — LOW (ref 13–17)
HOROWITZ INDEX BLDV+IHG-RTO: 100 — SIGNIFICANT CHANGE UP
LACTATE BLDV-MCNC: 0.9 MMOL/L — SIGNIFICANT CHANGE UP (ref 0.5–2)
LDH SERPL L TO P-CCNC: 39 U/L — LOW (ref 50–242)
LIDOCAIN IGE QN: 250 U/L — HIGH (ref 7–60)
MAGNESIUM SERPL-MCNC: 2.7 MG/DL — HIGH (ref 1.6–2.6)
MCHC RBC-ENTMCNC: 28.8 PG — SIGNIFICANT CHANGE UP (ref 27–34)
MCHC RBC-ENTMCNC: 30.5 GM/DL — LOW (ref 32–36)
MCV RBC AUTO: 94.3 FL — SIGNIFICANT CHANGE UP (ref 80–100)
NRBC # BLD: 0 /100 WBCS — SIGNIFICANT CHANGE UP (ref 0–0)
PCO2 BLDV: 43 MMHG — SIGNIFICANT CHANGE UP (ref 42–55)
PCO2 BLDV: 44 MMHG — SIGNIFICANT CHANGE UP (ref 42–55)
PH BLDV: 7.32 — SIGNIFICANT CHANGE UP (ref 7.32–7.43)
PH BLDV: 7.37 — SIGNIFICANT CHANGE UP (ref 7.32–7.43)
PHOSPHATE SERPL-MCNC: 3.8 MG/DL — SIGNIFICANT CHANGE UP (ref 2.5–4.5)
PLATELET # BLD AUTO: 90 K/UL — LOW (ref 150–400)
PO2 BLDV: 39 MMHG — SIGNIFICANT CHANGE UP (ref 25–45)
PO2 BLDV: 61 MMHG — HIGH (ref 25–45)
POTASSIUM BLDV-SCNC: 3.3 MMOL/L — LOW (ref 3.5–5.1)
POTASSIUM SERPL-MCNC: 3.5 MMOL/L — SIGNIFICANT CHANGE UP (ref 3.5–5.3)
POTASSIUM SERPL-SCNC: 3.5 MMOL/L — SIGNIFICANT CHANGE UP (ref 3.5–5.3)
PROT SERPL-MCNC: 6.3 G/DL — SIGNIFICANT CHANGE UP (ref 6–8.3)
RBC # BLD: 2.99 M/UL — LOW (ref 4.2–5.8)
RBC # FLD: 17.9 % — HIGH (ref 10.3–14.5)
SAO2 % BLDV: 70.5 % — SIGNIFICANT CHANGE UP (ref 67–88)
SAO2 % BLDV: 90.4 % — HIGH (ref 67–88)
SODIUM SERPL-SCNC: 134 MMOL/L — LOW (ref 135–145)
WBC # BLD: 10.43 K/UL — SIGNIFICANT CHANGE UP (ref 3.8–10.5)
WBC # FLD AUTO: 10.43 K/UL — SIGNIFICANT CHANGE UP (ref 3.8–10.5)

## 2022-12-18 PROCEDURE — 99292 CRITICAL CARE ADDL 30 MIN: CPT | Mod: 25

## 2022-12-18 PROCEDURE — 99291 CRITICAL CARE FIRST HOUR: CPT | Mod: 25

## 2022-12-18 PROCEDURE — 99233 SBSQ HOSP IP/OBS HIGH 50: CPT | Mod: GC

## 2022-12-18 PROCEDURE — 99232 SBSQ HOSP IP/OBS MODERATE 35: CPT

## 2022-12-18 PROCEDURE — 71045 X-RAY EXAM CHEST 1 VIEW: CPT | Mod: 26

## 2022-12-18 PROCEDURE — 36620 INSERTION CATHETER ARTERY: CPT | Mod: 58

## 2022-12-18 PROCEDURE — 36556 INSERT NON-TUNNEL CV CATH: CPT

## 2022-12-18 PROCEDURE — 36800 INSERTION OF CANNULA: CPT | Mod: 58

## 2022-12-18 RX ORDER — ACETAMINOPHEN 500 MG
650 TABLET ORAL EVERY 6 HOURS
Refills: 0 | Status: DISCONTINUED | OUTPATIENT
Start: 2022-12-18 | End: 2023-01-20

## 2022-12-18 RX ORDER — MAGNESIUM SULFATE 500 MG/ML
2 VIAL (ML) INJECTION ONCE
Refills: 0 | Status: COMPLETED | OUTPATIENT
Start: 2022-12-18 | End: 2022-12-18

## 2022-12-18 RX ORDER — AMIODARONE HYDROCHLORIDE 400 MG/1
150 TABLET ORAL ONCE
Refills: 0 | Status: COMPLETED | OUTPATIENT
Start: 2022-12-18 | End: 2022-12-18

## 2022-12-18 RX ORDER — HYDROMORPHONE HYDROCHLORIDE 2 MG/ML
0.5 INJECTION INTRAMUSCULAR; INTRAVENOUS; SUBCUTANEOUS ONCE
Refills: 0 | Status: DISCONTINUED | OUTPATIENT
Start: 2022-12-18 | End: 2022-12-18

## 2022-12-18 RX ORDER — AMIODARONE HYDROCHLORIDE 400 MG/1
0.5 TABLET ORAL
Qty: 900 | Refills: 0 | Status: DISCONTINUED | OUTPATIENT
Start: 2022-12-18 | End: 2022-12-19

## 2022-12-18 RX ORDER — METOCLOPRAMIDE HCL 10 MG
10 TABLET ORAL ONCE
Refills: 0 | Status: COMPLETED | OUTPATIENT
Start: 2022-12-18 | End: 2022-12-18

## 2022-12-18 RX ORDER — CHLORHEXIDINE GLUCONATE 213 G/1000ML
1 SOLUTION TOPICAL
Refills: 0 | Status: DISCONTINUED | OUTPATIENT
Start: 2022-12-18 | End: 2022-12-18

## 2022-12-18 RX ORDER — HEPARIN SODIUM 5000 [USP'U]/ML
300 INJECTION INTRAVENOUS; SUBCUTANEOUS
Qty: 10000 | Refills: 0 | Status: DISCONTINUED | OUTPATIENT
Start: 2022-12-18 | End: 2022-12-19

## 2022-12-18 RX ORDER — HYDROMORPHONE HYDROCHLORIDE 2 MG/ML
1 INJECTION INTRAMUSCULAR; INTRAVENOUS; SUBCUTANEOUS ONCE
Refills: 0 | Status: DISCONTINUED | OUTPATIENT
Start: 2022-12-18 | End: 2022-12-18

## 2022-12-18 RX ORDER — SODIUM CHLORIDE 9 MG/ML
10 INJECTION INTRAMUSCULAR; INTRAVENOUS; SUBCUTANEOUS
Refills: 0 | Status: DISCONTINUED | OUTPATIENT
Start: 2022-12-18 | End: 2022-12-18

## 2022-12-18 RX ORDER — POTASSIUM CHLORIDE 20 MEQ
10 PACKET (EA) ORAL ONCE
Refills: 0 | Status: COMPLETED | OUTPATIENT
Start: 2022-12-18 | End: 2022-12-18

## 2022-12-18 RX ADMIN — PROPOFOL 11.1 MICROGRAM(S)/KG/MIN: 10 INJECTION, EMULSION INTRAVENOUS at 05:29

## 2022-12-18 RX ADMIN — MILRINONE LACTATE 2.79 MICROGRAM(S)/KG/MIN: 1 INJECTION, SOLUTION INTRAVENOUS at 05:30

## 2022-12-18 RX ADMIN — INSULIN HUMAN 3 UNIT(S)/HR: 100 INJECTION, SOLUTION SUBCUTANEOUS at 05:30

## 2022-12-18 RX ADMIN — HEPARIN SODIUM 5000 UNIT(S): 5000 INJECTION INTRAVENOUS; SUBCUTANEOUS at 01:43

## 2022-12-18 RX ADMIN — Medication 81 MILLIGRAM(S): at 12:29

## 2022-12-18 RX ADMIN — Medication 100 MILLIGRAM(S): at 12:30

## 2022-12-18 RX ADMIN — AMIODARONE HYDROCHLORIDE 200 MILLIGRAM(S): 400 TABLET ORAL at 05:25

## 2022-12-18 RX ADMIN — Medication 650 MILLIGRAM(S): at 06:16

## 2022-12-18 RX ADMIN — Medication 3 MILLILITER(S): at 17:09

## 2022-12-18 RX ADMIN — Medication 50 MILLIEQUIVALENT(S): at 06:24

## 2022-12-18 RX ADMIN — HEPARIN SODIUM 5000 UNIT(S): 5000 INJECTION INTRAVENOUS; SUBCUTANEOUS at 09:26

## 2022-12-18 RX ADMIN — INSULIN HUMAN 3 UNIT(S)/HR: 100 INJECTION, SOLUTION SUBCUTANEOUS at 21:07

## 2022-12-18 RX ADMIN — ATORVASTATIN CALCIUM 40 MILLIGRAM(S): 80 TABLET, FILM COATED ORAL at 21:07

## 2022-12-18 RX ADMIN — PANTOPRAZOLE SODIUM 40 MILLIGRAM(S): 20 TABLET, DELAYED RELEASE ORAL at 12:30

## 2022-12-18 RX ADMIN — HYDROMORPHONE HYDROCHLORIDE 1 MILLIGRAM(S): 2 INJECTION INTRAMUSCULAR; INTRAVENOUS; SUBCUTANEOUS at 00:59

## 2022-12-18 RX ADMIN — CHLORHEXIDINE GLUCONATE 15 MILLILITER(S): 213 SOLUTION TOPICAL at 17:07

## 2022-12-18 RX ADMIN — HEPARIN SODIUM 5000 UNIT(S): 5000 INJECTION INTRAVENOUS; SUBCUTANEOUS at 17:07

## 2022-12-18 RX ADMIN — Medication 3 MILLILITER(S): at 11:47

## 2022-12-18 RX ADMIN — Medication 3 MILLILITER(S): at 05:45

## 2022-12-18 RX ADMIN — Medication 650 MILLIGRAM(S): at 05:46

## 2022-12-18 RX ADMIN — AMIODARONE HYDROCHLORIDE 600 MILLIGRAM(S): 400 TABLET ORAL at 06:53

## 2022-12-18 RX ADMIN — VASOPRESSIN 3 UNIT(S)/MIN: 20 INJECTION INTRAVENOUS at 05:30

## 2022-12-18 RX ADMIN — Medication 8.71 MICROGRAM(S)/KG/MIN: at 05:29

## 2022-12-18 RX ADMIN — Medication 25 GRAM(S): at 06:24

## 2022-12-18 RX ADMIN — HYDROMORPHONE HYDROCHLORIDE 1 MILLIGRAM(S): 2 INJECTION INTRAMUSCULAR; INTRAVENOUS; SUBCUTANEOUS at 00:44

## 2022-12-18 RX ADMIN — Medication 10 MILLIGRAM(S): at 15:25

## 2022-12-18 RX ADMIN — AMIODARONE HYDROCHLORIDE 16.7 MG/MIN: 400 TABLET ORAL at 21:08

## 2022-12-18 RX ADMIN — CHLORHEXIDINE GLUCONATE 15 MILLILITER(S): 213 SOLUTION TOPICAL at 05:25

## 2022-12-18 RX ADMIN — Medication 15 MILLILITER(S): at 12:29

## 2022-12-18 RX ADMIN — CHLORHEXIDINE GLUCONATE 1 APPLICATION(S): 213 SOLUTION TOPICAL at 05:25

## 2022-12-18 NOTE — PROGRESS NOTE ADULT - SUBJECTIVE AND OBJECTIVE BOX
ENT ISSUE/POD: S/P #7 Proximal XLT tracheostomy POD#2.    HPI: 61 YO M with PMH of CABG, DM, HTN, HLD presenting as transfer from [place] for STEMI. Course c/b gallstone pancreatitis leading to ARDS, shock, cardiac arrest then placed on [facility] ECMO. Pt underwent ECMO decannulation. ENT called to evaluate for tracheostomy.  Now S/P #7 Proximal XLT Tracheostomy POD # 2.  Doing well on the ventilator. No bleeding noted.     PAST MEDICAL & SURGICAL HISTORY:    Allergies  No Known Allergies    Intolerances    MEDICATIONS  (STANDING):  albuterol/ipratropium for Nebulization 3 milliLiter(s) Nebulizer every 6 hours  aMIOdarone    Tablet 200 milliGRAM(s) Oral daily  aspirin  chewable 81 milliGRAM(s) Oral daily  atorvastatin 40 milliGRAM(s) Oral at bedtime  chlorhexidine 0.12% Liquid 15 milliLiter(s) Oral Mucosa every 12 hours  chlorhexidine 4% Liquid 1 Application(s) Topical <User Schedule>  CRRT Treatment    <Continuous>  dexMEDEtomidine Infusion 0.4 MICROgram(s)/kG/Hr (9.29 mL/Hr) IV Continuous <Continuous>  heparin   Injectable 5000 Unit(s) SubCutaneous every 8 hours  heparin  Infusion Syringe 300 Unit(s)/Hr (0.6 mL/Hr) CRRT <Continuous>  insulin regular Infusion 3 Unit(s)/Hr (3 mL/Hr) IV Continuous <Continuous>  magnesium sulfate  IVPB 2 Gram(s) IV Intermittent once  milrinone Infusion 0.1 MICROgram(s)/kG/Min (2.79 mL/Hr) IV Continuous <Continuous>  multivitamin/minerals/iron Oral Solution (CENTRUM) 15 milliLiter(s) Oral daily  norepinephrine Infusion 0.05 MICROgram(s)/kG/Min (8.71 mL/Hr) IV Continuous <Continuous>  pantoprazole  Injectable 40 milliGRAM(s) IV Push daily  Phoxillum Filtration BK 4 / 2.5 5000 milliLiter(s) (2400 mL/Hr) CRRT <Continuous>  Phoxillum Filtration BK 4 / 2.5 5000 milliLiter(s) (800 mL/Hr) CRRT <Continuous>  potassium chloride  10 mEq/50 mL IVPB 10 milliEquivalent(s) IV Intermittent once  PrismaSOL Filtration BGK 4 / 2.5 5000 milliLiter(s) (200 mL/Hr) CRRT <Continuous>  thiamine 100 milliGRAM(s) Enteral Tube daily  vasopressin Infusion 0.02 Unit(s)/Min (3 mL/Hr) IV Continuous <Continuous>    MEDICATIONS  (PRN):  acetaminophen    Suspension .. 650 milliGRAM(s) Oral every 6 hours PRN Temp greater or equal to 38.5C (101.3F)  sodium chloride 0.9% lock flush 10 milliLiter(s) IV Push every 1 hour PRN Pre/post blood products, medications, blood draw, and to maintain line patency    Social History: SEE CONSULT.   Family history: SEE CONSULT.    ROS:   ENT: all negative except as noted in HPI   Pulm: denies SOB, cough, hemoptysis  Neuro: denies numbness/tingling, loss of sensation  Endo: denies heat/cold intolerance, excessive sweating    Vital Signs Last 24 Hrs  T(C): 37.1 (18 Dec 2022 06:00), Max: 38.1 (17 Dec 2022 23:00)  T(F): 98.8 (18 Dec 2022 06:00), Max: 100.5 (17 Dec 2022 23:00)  HR: 117 (18 Dec 2022 06:00) (70 - 120)  BP: 97/51 (18 Dec 2022 05:00) (91/52 - 126/61)  BP(mean): 70 (18 Dec 2022 05:00) (67 - 88)  RR: 24 (18 Dec 2022 06:00) (12 - 32)  SpO2: 100% (18 Dec 2022 06:00) (91% - 100%)  Parameters below as of 18 Dec 2022 06:00  Patient On (Oxygen Delivery Method): ventilator                          8.6    10.43 )-----------( 90       ( 18 Dec 2022 02:21 )             28.2    12-18    134<L>  |  101  |  25<H>  ----------------------------<  106<H>  3.5   |  20<L>  |  1.75<H>    Ca    8.0<L>      18 Dec 2022 02:16  Phos  3.8     12-18  Mg     2.7     12-18  TPro  6.3  /  Alb  2.9<L>  /  TBili  0.5  /  DBili  x   /  AST  31  /  ALT  12  /  AlkPhos  98  12-18   PT/INR - ( 17 Dec 2022 00:12 )   PT: 11.6 sec;   INR: 1.01 ratio     PTT - ( 18 Dec 2022 02:21 )  PTT:27.2 sec    PHYSICAL EXAM:  Gen: NAD, on vent  Skin: No rashes, bruises, or lesions  Head: Normocephalic, Atraumatic  Face: no edema, erythema, or fluctuance. Parotid glands soft without mass  Eyes: no scleral injection  Nose: Nares bilaterally patent, no discharge  Mouth: No Stridor / Drooling / Trismus.  Mucosa moist, tongue/uvula midline, oropharynx clear  Neck: #7 Proximal XLT Trach  secured with sutures x 4 and velcro strap.  No hematoma or crepitus.  Flat, supple, no lymphadenopathy, trachea midline, no masses  Lymphatic: No lymphadenopathy  Resp: on vent   Neuro: facial nerve intact, no facial droop ENT ISSUE/POD: S/P #7 Proximal XLT tracheostomy POD#2.    HPI: 63 YO M with PMH of CABG, DM, HTN, HLD presenting as transfer from [place] for STEMI. Course c/b gallstone pancreatitis leading to ARDS, shock, cardiac arrest then placed on [facility] ECMO. Pt underwent ECMO decannulation. ENT called to evaluate for tracheostomy.  Now S/P #7 Proximal XLT Tracheostomy POD # 2.  Doing well on the ventilator. No bleeding noted.     PAST MEDICAL & SURGICAL HISTORY:    Allergies  No Known Allergies    Intolerances    MEDICATIONS  (STANDING):  albuterol/ipratropium for Nebulization 3 milliLiter(s) Nebulizer every 6 hours  aMIOdarone    Tablet 200 milliGRAM(s) Oral daily  aspirin  chewable 81 milliGRAM(s) Oral daily  atorvastatin 40 milliGRAM(s) Oral at bedtime  chlorhexidine 0.12% Liquid 15 milliLiter(s) Oral Mucosa every 12 hours  chlorhexidine 4% Liquid 1 Application(s) Topical <User Schedule>  CRRT Treatment    <Continuous>  dexMEDEtomidine Infusion 0.4 MICROgram(s)/kG/Hr (9.29 mL/Hr) IV Continuous <Continuous>  heparin   Injectable 5000 Unit(s) SubCutaneous every 8 hours  heparin  Infusion Syringe 300 Unit(s)/Hr (0.6 mL/Hr) CRRT <Continuous>  insulin regular Infusion 3 Unit(s)/Hr (3 mL/Hr) IV Continuous <Continuous>  magnesium sulfate  IVPB 2 Gram(s) IV Intermittent once  milrinone Infusion 0.1 MICROgram(s)/kG/Min (2.79 mL/Hr) IV Continuous <Continuous>  multivitamin/minerals/iron Oral Solution (CENTRUM) 15 milliLiter(s) Oral daily  norepinephrine Infusion 0.05 MICROgram(s)/kG/Min (8.71 mL/Hr) IV Continuous <Continuous>  pantoprazole  Injectable 40 milliGRAM(s) IV Push daily  Phoxillum Filtration BK 4 / 2.5 5000 milliLiter(s) (2400 mL/Hr) CRRT <Continuous>  Phoxillum Filtration BK 4 / 2.5 5000 milliLiter(s) (800 mL/Hr) CRRT <Continuous>  potassium chloride  10 mEq/50 mL IVPB 10 milliEquivalent(s) IV Intermittent once  PrismaSOL Filtration BGK 4 / 2.5 5000 milliLiter(s) (200 mL/Hr) CRRT <Continuous>  thiamine 100 milliGRAM(s) Enteral Tube daily  vasopressin Infusion 0.02 Unit(s)/Min (3 mL/Hr) IV Continuous <Continuous>    MEDICATIONS  (PRN):  acetaminophen    Suspension .. 650 milliGRAM(s) Oral every 6 hours PRN Temp greater or equal to 38.5C (101.3F)  sodium chloride 0.9% lock flush 10 milliLiter(s) IV Push every 1 hour PRN Pre/post blood products, medications, blood draw, and to maintain line patency    Social History: SEE CONSULT.   Family history: SEE CONSULT.    ROS:   ENT: all negative except as noted in HPI   Pulm: denies SOB, cough, hemoptysis  Neuro: denies numbness/tingling, loss of sensation  Endo: denies heat/cold intolerance, excessive sweating    Vital Signs Last 24 Hrs  T(C): 37.1 (18 Dec 2022 06:00), Max: 38.1 (17 Dec 2022 23:00)  T(F): 98.8 (18 Dec 2022 06:00), Max: 100.5 (17 Dec 2022 23:00)  HR: 117 (18 Dec 2022 06:00) (70 - 120)  BP: 97/51 (18 Dec 2022 05:00) (91/52 - 126/61)  BP(mean): 70 (18 Dec 2022 05:00) (67 - 88)  RR: 24 (18 Dec 2022 06:00) (12 - 32)  SpO2: 100% (18 Dec 2022 06:00) (91% - 100%)  Parameters below as of 18 Dec 2022 06:00  Patient On (Oxygen Delivery Method): ventilator                          8.6    10.43 )-----------( 90       ( 18 Dec 2022 02:21 )             28.2    12-18    134<L>  |  101  |  25<H>  ----------------------------<  106<H>  3.5   |  20<L>  |  1.75<H>    Ca    8.0<L>      18 Dec 2022 02:16  Phos  3.8     12-18  Mg     2.7     12-18  TPro  6.3  /  Alb  2.9<L>  /  TBili  0.5  /  DBili  x   /  AST  31  /  ALT  12  /  AlkPhos  98  12-18   PT/INR - ( 17 Dec 2022 00:12 )   PT: 11.6 sec;   INR: 1.01 ratio     PTT - ( 18 Dec 2022 02:21 )  PTT:27.2 sec    PHYSICAL EXAM:  Gen: NAD, on vent  Skin: No rashes, bruises, or lesions  Head: Normocephalic, Atraumatic  Face: no edema, erythema, or fluctuance. Parotid glands soft without mass  Eyes: no scleral injection  Nose: Nares bilaterally patent, no discharge  Mouth: No Stridor / Drooling / Trismus.  Mucosa moist, tongue/uvula midline, oropharynx clear  Neck: #7 Proximal XLT Trach  secured with sutures x 4 and velcro strap.  No hematoma or crepitus.  Flat, supple, no lymphadenopathy, trachea midline, no masses  Lymphatic: No lymphadenopathy  Resp: on vent   Neuro: facial nerve intact, no facial droop ENT ISSUE/POD: S/P #7 Proximal XLT tracheostomy POD#2.    HPI: 61 YO M with PMH of CABG, DM, HTN, HLD presenting as transfer from [place] for STEMI. Course c/b gallstone pancreatitis leading to ARDS, shock, cardiac arrest then placed on [facility] ECMO. Pt underwent ECMO decannulation. ENT called to evaluate for tracheostomy.  Now S/P #7 Proximal XLT Tracheostomy POD # 2.  Doing well on the ventilator. No bleeding noted.     PAST MEDICAL & SURGICAL HISTORY:    Allergies  No Known Allergies    Intolerances    MEDICATIONS  (STANDING):  albuterol/ipratropium for Nebulization 3 milliLiter(s) Nebulizer every 6 hours  aMIOdarone    Tablet 200 milliGRAM(s) Oral daily  aspirin  chewable 81 milliGRAM(s) Oral daily  atorvastatin 40 milliGRAM(s) Oral at bedtime  chlorhexidine 0.12% Liquid 15 milliLiter(s) Oral Mucosa every 12 hours  chlorhexidine 4% Liquid 1 Application(s) Topical <User Schedule>  CRRT Treatment    <Continuous>  dexMEDEtomidine Infusion 0.4 MICROgram(s)/kG/Hr (9.29 mL/Hr) IV Continuous <Continuous>  heparin   Injectable 5000 Unit(s) SubCutaneous every 8 hours  heparin  Infusion Syringe 300 Unit(s)/Hr (0.6 mL/Hr) CRRT <Continuous>  insulin regular Infusion 3 Unit(s)/Hr (3 mL/Hr) IV Continuous <Continuous>  magnesium sulfate  IVPB 2 Gram(s) IV Intermittent once  milrinone Infusion 0.1 MICROgram(s)/kG/Min (2.79 mL/Hr) IV Continuous <Continuous>  multivitamin/minerals/iron Oral Solution (CENTRUM) 15 milliLiter(s) Oral daily  norepinephrine Infusion 0.05 MICROgram(s)/kG/Min (8.71 mL/Hr) IV Continuous <Continuous>  pantoprazole  Injectable 40 milliGRAM(s) IV Push daily  Phoxillum Filtration BK 4 / 2.5 5000 milliLiter(s) (2400 mL/Hr) CRRT <Continuous>  Phoxillum Filtration BK 4 / 2.5 5000 milliLiter(s) (800 mL/Hr) CRRT <Continuous>  potassium chloride  10 mEq/50 mL IVPB 10 milliEquivalent(s) IV Intermittent once  PrismaSOL Filtration BGK 4 / 2.5 5000 milliLiter(s) (200 mL/Hr) CRRT <Continuous>  thiamine 100 milliGRAM(s) Enteral Tube daily  vasopressin Infusion 0.02 Unit(s)/Min (3 mL/Hr) IV Continuous <Continuous>    MEDICATIONS  (PRN):  acetaminophen    Suspension .. 650 milliGRAM(s) Oral every 6 hours PRN Temp greater or equal to 38.5C (101.3F)  sodium chloride 0.9% lock flush 10 milliLiter(s) IV Push every 1 hour PRN Pre/post blood products, medications, blood draw, and to maintain line patency    Social History: SEE CONSULT.   Family history: SEE CONSULT.    ROS:   ENT: all negative except as noted in HPI   Pulm: denies SOB, cough, hemoptysis  Neuro: denies numbness/tingling, loss of sensation  Endo: denies heat/cold intolerance, excessive sweating    Vital Signs Last 24 Hrs  T(C): 37.1 (18 Dec 2022 06:00), Max: 38.1 (17 Dec 2022 23:00)  T(F): 98.8 (18 Dec 2022 06:00), Max: 100.5 (17 Dec 2022 23:00)  HR: 117 (18 Dec 2022 06:00) (70 - 120)  BP: 97/51 (18 Dec 2022 05:00) (91/52 - 126/61)  BP(mean): 70 (18 Dec 2022 05:00) (67 - 88)  RR: 24 (18 Dec 2022 06:00) (12 - 32)  SpO2: 100% (18 Dec 2022 06:00) (91% - 100%)  Parameters below as of 18 Dec 2022 06:00  Patient On (Oxygen Delivery Method): ventilator                          8.6    10.43 )-----------( 90       ( 18 Dec 2022 02:21 )             28.2    12-18    134<L>  |  101  |  25<H>  ----------------------------<  106<H>  3.5   |  20<L>  |  1.75<H>    Ca    8.0<L>      18 Dec 2022 02:16  Phos  3.8     12-18  Mg     2.7     12-18  TPro  6.3  /  Alb  2.9<L>  /  TBili  0.5  /  DBili  x   /  AST  31  /  ALT  12  /  AlkPhos  98  12-18   PT/INR - ( 17 Dec 2022 00:12 )   PT: 11.6 sec;   INR: 1.01 ratio     PTT - ( 18 Dec 2022 02:21 )  PTT:27.2 sec    PHYSICAL EXAM:  Gen: NAD, on vent  Skin: No rashes, bruises, or lesions  Head: Normocephalic, Atraumatic  Face: no edema, erythema, or fluctuance. Parotid glands soft without mass  Eyes: no scleral injection  Nose: Nares bilaterally patent, no discharge  Mouth: No Stridor / Drooling / Trismus.  Mucosa moist, tongue/uvula midline, oropharynx clear  Neck: #7 Proximal XLT Trach  secured with sutures x 4 and velcro strap.  No hematoma or crepitus.  Flat, supple, no lymphadenopathy, trachea midline, no masses  Lymphatic: No lymphadenopathy  Resp: on vent   Neuro: facial nerve intact, no facial droop ENT ISSUE/POD: S/P #7 Proximal XLT tracheostomy POD#2.    HPI: 61 YO M with PMH of CABG, DM, HTN, HLD presenting as transfer from [place] for STEMI. Course c/b gallstone pancreatitis leading to ARDS, shock, cardiac arrest then placed on [facility] ECMO. Pt underwent ECMO decannulation. ENT called to evaluate for tracheostomy.  Now S/P #7 Proximal XLT Tracheostomy POD # 2.  Doing well on the ventilator. No bleeding noted.     PAST MEDICAL & SURGICAL HISTORY:    Allergies  No Known Allergies    Intolerances    MEDICATIONS  (STANDING):  albuterol/ipratropium for Nebulization 3 milliLiter(s) Nebulizer every 6 hours  aMIOdarone    Tablet 200 milliGRAM(s) Oral daily  aspirin  chewable 81 milliGRAM(s) Oral daily  atorvastatin 40 milliGRAM(s) Oral at bedtime  chlorhexidine 0.12% Liquid 15 milliLiter(s) Oral Mucosa every 12 hours  chlorhexidine 4% Liquid 1 Application(s) Topical <User Schedule>  CRRT Treatment    <Continuous>  dexMEDEtomidine Infusion 0.4 MICROgram(s)/kG/Hr (9.29 mL/Hr) IV Continuous <Continuous>  heparin   Injectable 5000 Unit(s) SubCutaneous every 8 hours  heparin  Infusion Syringe 300 Unit(s)/Hr (0.6 mL/Hr) CRRT <Continuous>  insulin regular Infusion 3 Unit(s)/Hr (3 mL/Hr) IV Continuous <Continuous>  magnesium sulfate  IVPB 2 Gram(s) IV Intermittent once  milrinone Infusion 0.1 MICROgram(s)/kG/Min (2.79 mL/Hr) IV Continuous <Continuous>  multivitamin/minerals/iron Oral Solution (CENTRUM) 15 milliLiter(s) Oral daily  norepinephrine Infusion 0.05 MICROgram(s)/kG/Min (8.71 mL/Hr) IV Continuous <Continuous>  pantoprazole  Injectable 40 milliGRAM(s) IV Push daily  Phoxillum Filtration BK 4 / 2.5 5000 milliLiter(s) (2400 mL/Hr) CRRT <Continuous>  Phoxillum Filtration BK 4 / 2.5 5000 milliLiter(s) (800 mL/Hr) CRRT <Continuous>  potassium chloride  10 mEq/50 mL IVPB 10 milliEquivalent(s) IV Intermittent once  PrismaSOL Filtration BGK 4 / 2.5 5000 milliLiter(s) (200 mL/Hr) CRRT <Continuous>  thiamine 100 milliGRAM(s) Enteral Tube daily  vasopressin Infusion 0.02 Unit(s)/Min (3 mL/Hr) IV Continuous <Continuous>    MEDICATIONS  (PRN):  acetaminophen    Suspension .. 650 milliGRAM(s) Oral every 6 hours PRN Temp greater or equal to 38.5C (101.3F)  sodium chloride 0.9% lock flush 10 milliLiter(s) IV Push every 1 hour PRN Pre/post blood products, medications, blood draw, and to maintain line patency    Social History: SEE CONSULT.   Family history: SEE CONSULT.    ROS:   ENT: all negative except as noted in HPI   Pulm: denies SOB, cough, hemoptysis  Neuro: denies numbness/tingling, loss of sensation  Endo: denies heat/cold intolerance, excessive sweating    Vital Signs Last 24 Hrs  T(C): 37.1 (18 Dec 2022 06:00), Max: 38.1 (17 Dec 2022 23:00)  T(F): 98.8 (18 Dec 2022 06:00), Max: 100.5 (17 Dec 2022 23:00)  HR: 117 (18 Dec 2022 06:00) (70 - 120)  BP: 97/51 (18 Dec 2022 05:00) (91/52 - 126/61)  BP(mean): 70 (18 Dec 2022 05:00) (67 - 88)  RR: 24 (18 Dec 2022 06:00) (12 - 32)  SpO2: 100% (18 Dec 2022 06:00) (91% - 100%)  Parameters below as of 18 Dec 2022 06:00  Patient On (Oxygen Delivery Method): ventilator                          8.6    10.43 )-----------( 90       ( 18 Dec 2022 02:21 )             28.2    12-18    134<L>  |  101  |  25<H>  ----------------------------<  106<H>  3.5   |  20<L>  |  1.75<H>    Ca    8.0<L>      18 Dec 2022 02:16  Phos  3.8     12-18  Mg     2.7     12-18  TPro  6.3  /  Alb  2.9<L>  /  TBili  0.5  /  DBili  x   /  AST  31  /  ALT  12  /  AlkPhos  98  12-18   PT/INR - ( 17 Dec 2022 00:12 )   PT: 11.6 sec;   INR: 1.01 ratio     PTT - ( 18 Dec 2022 02:21 )  PTT:27.2 sec    PHYSICAL EXAM:  Gen: NAD, on vent  Skin: No rashes, bruises, or lesions  Head: Normocephalic, Atraumatic  Face: no edema, erythema, or fluctuance. Parotid glands soft without mass  Eyes: no scleral injection  Nose: Nares bilaterally patent, no discharge  Mouth: No Stridor / Drooling / Trismus.  Mucosa moist, tongue/uvula midline, oropharynx clear  Neck: #7 Proximal XLT Trach  secured with sutures x 4 and umbilical tie.  No hematoma or crepitus.  Flat, supple, no lymphadenopathy, trachea midline, no masses  Lymphatic: No lymphadenopathy  Resp: on vent   Neuro: facial nerve intact, no facial droop ENT ISSUE/POD: S/P #7 Proximal XLT tracheostomy POD#2.    HPI: 63 YO M with PMH of CABG, DM, HTN, HLD presenting as transfer from [place] for STEMI. Course c/b gallstone pancreatitis leading to ARDS, shock, cardiac arrest then placed on [facility] ECMO. Pt underwent ECMO decannulation. ENT called to evaluate for tracheostomy.  Now S/P #7 Proximal XLT Tracheostomy POD # 2.  Doing well on the ventilator. No bleeding noted.     PAST MEDICAL & SURGICAL HISTORY:    Allergies  No Known Allergies    Intolerances    MEDICATIONS  (STANDING):  albuterol/ipratropium for Nebulization 3 milliLiter(s) Nebulizer every 6 hours  aMIOdarone    Tablet 200 milliGRAM(s) Oral daily  aspirin  chewable 81 milliGRAM(s) Oral daily  atorvastatin 40 milliGRAM(s) Oral at bedtime  chlorhexidine 0.12% Liquid 15 milliLiter(s) Oral Mucosa every 12 hours  chlorhexidine 4% Liquid 1 Application(s) Topical <User Schedule>  CRRT Treatment    <Continuous>  dexMEDEtomidine Infusion 0.4 MICROgram(s)/kG/Hr (9.29 mL/Hr) IV Continuous <Continuous>  heparin   Injectable 5000 Unit(s) SubCutaneous every 8 hours  heparin  Infusion Syringe 300 Unit(s)/Hr (0.6 mL/Hr) CRRT <Continuous>  insulin regular Infusion 3 Unit(s)/Hr (3 mL/Hr) IV Continuous <Continuous>  magnesium sulfate  IVPB 2 Gram(s) IV Intermittent once  milrinone Infusion 0.1 MICROgram(s)/kG/Min (2.79 mL/Hr) IV Continuous <Continuous>  multivitamin/minerals/iron Oral Solution (CENTRUM) 15 milliLiter(s) Oral daily  norepinephrine Infusion 0.05 MICROgram(s)/kG/Min (8.71 mL/Hr) IV Continuous <Continuous>  pantoprazole  Injectable 40 milliGRAM(s) IV Push daily  Phoxillum Filtration BK 4 / 2.5 5000 milliLiter(s) (2400 mL/Hr) CRRT <Continuous>  Phoxillum Filtration BK 4 / 2.5 5000 milliLiter(s) (800 mL/Hr) CRRT <Continuous>  potassium chloride  10 mEq/50 mL IVPB 10 milliEquivalent(s) IV Intermittent once  PrismaSOL Filtration BGK 4 / 2.5 5000 milliLiter(s) (200 mL/Hr) CRRT <Continuous>  thiamine 100 milliGRAM(s) Enteral Tube daily  vasopressin Infusion 0.02 Unit(s)/Min (3 mL/Hr) IV Continuous <Continuous>    MEDICATIONS  (PRN):  acetaminophen    Suspension .. 650 milliGRAM(s) Oral every 6 hours PRN Temp greater or equal to 38.5C (101.3F)  sodium chloride 0.9% lock flush 10 milliLiter(s) IV Push every 1 hour PRN Pre/post blood products, medications, blood draw, and to maintain line patency    Social History: SEE CONSULT.   Family history: SEE CONSULT.    ROS:   ENT: all negative except as noted in HPI   Pulm: denies SOB, cough, hemoptysis  Neuro: denies numbness/tingling, loss of sensation  Endo: denies heat/cold intolerance, excessive sweating    Vital Signs Last 24 Hrs  T(C): 37.1 (18 Dec 2022 06:00), Max: 38.1 (17 Dec 2022 23:00)  T(F): 98.8 (18 Dec 2022 06:00), Max: 100.5 (17 Dec 2022 23:00)  HR: 117 (18 Dec 2022 06:00) (70 - 120)  BP: 97/51 (18 Dec 2022 05:00) (91/52 - 126/61)  BP(mean): 70 (18 Dec 2022 05:00) (67 - 88)  RR: 24 (18 Dec 2022 06:00) (12 - 32)  SpO2: 100% (18 Dec 2022 06:00) (91% - 100%)  Parameters below as of 18 Dec 2022 06:00  Patient On (Oxygen Delivery Method): ventilator                          8.6    10.43 )-----------( 90       ( 18 Dec 2022 02:21 )             28.2    12-18    134<L>  |  101  |  25<H>  ----------------------------<  106<H>  3.5   |  20<L>  |  1.75<H>    Ca    8.0<L>      18 Dec 2022 02:16  Phos  3.8     12-18  Mg     2.7     12-18  TPro  6.3  /  Alb  2.9<L>  /  TBili  0.5  /  DBili  x   /  AST  31  /  ALT  12  /  AlkPhos  98  12-18   PT/INR - ( 17 Dec 2022 00:12 )   PT: 11.6 sec;   INR: 1.01 ratio     PTT - ( 18 Dec 2022 02:21 )  PTT:27.2 sec    PHYSICAL EXAM:  Gen: NAD, on vent  Skin: No rashes, bruises, or lesions  Head: Normocephalic, Atraumatic  Face: no edema, erythema, or fluctuance. Parotid glands soft without mass  Eyes: no scleral injection  Nose: Nares bilaterally patent, no discharge  Mouth: No Stridor / Drooling / Trismus.  Mucosa moist, tongue/uvula midline, oropharynx clear  Neck: #7 Proximal XLT Trach  secured with sutures x 4 and umbilical tie.  No hematoma or crepitus.  Flat, supple, no lymphadenopathy, trachea midline, no masses  Lymphatic: No lymphadenopathy  Resp: on vent   Neuro: facial nerve intact, no facial droop ENT ISSUE/POD: S/P #7 Proximal XLT tracheostomy POD#2.    HPI: 61 YO M with PMH of CABG, DM, HTN, HLD presenting as transfer from [place] for STEMI. Course c/b gallstone pancreatitis leading to ARDS, shock, cardiac arrest then placed on [facility] ECMO. Pt underwent ECMO decannulation. ENT called to evaluate for tracheostomy.  Now S/P #7 Proximal XLT Tracheostomy POD # 2.  Doing well on the ventilator. No bleeding noted.     PAST MEDICAL & SURGICAL HISTORY:    Allergies  No Known Allergies    Intolerances    MEDICATIONS  (STANDING):  albuterol/ipratropium for Nebulization 3 milliLiter(s) Nebulizer every 6 hours  aMIOdarone    Tablet 200 milliGRAM(s) Oral daily  aspirin  chewable 81 milliGRAM(s) Oral daily  atorvastatin 40 milliGRAM(s) Oral at bedtime  chlorhexidine 0.12% Liquid 15 milliLiter(s) Oral Mucosa every 12 hours  chlorhexidine 4% Liquid 1 Application(s) Topical <User Schedule>  CRRT Treatment    <Continuous>  dexMEDEtomidine Infusion 0.4 MICROgram(s)/kG/Hr (9.29 mL/Hr) IV Continuous <Continuous>  heparin   Injectable 5000 Unit(s) SubCutaneous every 8 hours  heparin  Infusion Syringe 300 Unit(s)/Hr (0.6 mL/Hr) CRRT <Continuous>  insulin regular Infusion 3 Unit(s)/Hr (3 mL/Hr) IV Continuous <Continuous>  magnesium sulfate  IVPB 2 Gram(s) IV Intermittent once  milrinone Infusion 0.1 MICROgram(s)/kG/Min (2.79 mL/Hr) IV Continuous <Continuous>  multivitamin/minerals/iron Oral Solution (CENTRUM) 15 milliLiter(s) Oral daily  norepinephrine Infusion 0.05 MICROgram(s)/kG/Min (8.71 mL/Hr) IV Continuous <Continuous>  pantoprazole  Injectable 40 milliGRAM(s) IV Push daily  Phoxillum Filtration BK 4 / 2.5 5000 milliLiter(s) (2400 mL/Hr) CRRT <Continuous>  Phoxillum Filtration BK 4 / 2.5 5000 milliLiter(s) (800 mL/Hr) CRRT <Continuous>  potassium chloride  10 mEq/50 mL IVPB 10 milliEquivalent(s) IV Intermittent once  PrismaSOL Filtration BGK 4 / 2.5 5000 milliLiter(s) (200 mL/Hr) CRRT <Continuous>  thiamine 100 milliGRAM(s) Enteral Tube daily  vasopressin Infusion 0.02 Unit(s)/Min (3 mL/Hr) IV Continuous <Continuous>    MEDICATIONS  (PRN):  acetaminophen    Suspension .. 650 milliGRAM(s) Oral every 6 hours PRN Temp greater or equal to 38.5C (101.3F)  sodium chloride 0.9% lock flush 10 milliLiter(s) IV Push every 1 hour PRN Pre/post blood products, medications, blood draw, and to maintain line patency    Social History: SEE CONSULT.   Family history: SEE CONSULT.    ROS:   ENT: all negative except as noted in HPI   Pulm: denies SOB, cough, hemoptysis  Neuro: denies numbness/tingling, loss of sensation  Endo: denies heat/cold intolerance, excessive sweating    Vital Signs Last 24 Hrs  T(C): 37.1 (18 Dec 2022 06:00), Max: 38.1 (17 Dec 2022 23:00)  T(F): 98.8 (18 Dec 2022 06:00), Max: 100.5 (17 Dec 2022 23:00)  HR: 117 (18 Dec 2022 06:00) (70 - 120)  BP: 97/51 (18 Dec 2022 05:00) (91/52 - 126/61)  BP(mean): 70 (18 Dec 2022 05:00) (67 - 88)  RR: 24 (18 Dec 2022 06:00) (12 - 32)  SpO2: 100% (18 Dec 2022 06:00) (91% - 100%)  Parameters below as of 18 Dec 2022 06:00  Patient On (Oxygen Delivery Method): ventilator                          8.6    10.43 )-----------( 90       ( 18 Dec 2022 02:21 )             28.2    12-18    134<L>  |  101  |  25<H>  ----------------------------<  106<H>  3.5   |  20<L>  |  1.75<H>    Ca    8.0<L>      18 Dec 2022 02:16  Phos  3.8     12-18  Mg     2.7     12-18  TPro  6.3  /  Alb  2.9<L>  /  TBili  0.5  /  DBili  x   /  AST  31  /  ALT  12  /  AlkPhos  98  12-18   PT/INR - ( 17 Dec 2022 00:12 )   PT: 11.6 sec;   INR: 1.01 ratio     PTT - ( 18 Dec 2022 02:21 )  PTT:27.2 sec    PHYSICAL EXAM:  Gen: NAD, on vent  Skin: No rashes, bruises, or lesions  Head: Normocephalic, Atraumatic  Face: no edema, erythema, or fluctuance. Parotid glands soft without mass  Eyes: no scleral injection  Nose: Nares bilaterally patent, no discharge  Mouth: No Stridor / Drooling / Trismus.  Mucosa moist, tongue/uvula midline, oropharynx clear  Neck: #7 Proximal XLT Trach  secured with sutures x 4 and umbilical tie.  No hematoma or crepitus.  Flat, supple, no lymphadenopathy, trachea midline, no masses  Lymphatic: No lymphadenopathy  Resp: on vent   Neuro: facial nerve intact, no facial droop

## 2022-12-18 NOTE — PROGRESS NOTE ADULT - PROBLEM SELECTOR PLAN 1
Pt with non oliguric ERIC/ ATN in the setting of STEMI, cardiac arrest & cardiogenic shock  SCr on arrival was 2.15; trended down to hector 1.6 on 11/25;  slowly trended up & increased to 3.95 11/28.   Pt received IV diuretics. SCr increased to 4.19 with BUN of 101 on 12/5/22. Pt with significant volume overload. Pt initiated on CRRT/CVVHDF to optimize volume and electrolytes on 12/5/22. BP being maintained on vasopressors. Pt likely with ATN. Pt underwent decannulation of ECMO on 12/8/22 and was taken off CRRT. Pt reinitiated on CRRT overnight on 12/9/22 for volume overload. Labs reviewed.  s/p trach on 12/16 with FiO2 50%. CRRT without any issue, being kept net even. Remains on milrinone gtt. Pt remains anuric. On IV vasopressors. Plan is to continue CRRT with net even. Monitor labs and urine output. Avoid any potential nephrotoxins. Dose medications as per eGFR.

## 2022-12-18 NOTE — PROGRESS NOTE ADULT - ASSESSMENT
62M CAD, CABG, DM, HTN, chol admitted with pancreatitis not necrotizing on the CT scan and cholecystitis.  Sent to SICU but developed respiratory failure presumed to be due to ARDS. Moved to the CTU for ECMO for cardiogenic shock vs ARDS.  ECMO out, IABP placed.  Leukocytosis    Fever, rising leucocytosis, SIRS/sepsis, ERIC  S/P meropenem, vanco and diflucan  - repeat UC and BC sent-NGTD    ct scan with improving pancreatitis   sp mv clip     cultures are negative    to follow off ab and reculture for any clinical change  Intermittent fever, improved WBC    Brent Del Rio MD  pager [phone]  office [phone]  Over the weekend please call [phone] 62M CAD, CABG, DM, HTN, chol admitted with pancreatitis not necrotizing on the CT scan and cholecystitis.  Sent to SICU but developed respiratory failure presumed to be due to ARDS. Moved to the CTU for ECMO for cardiogenic shock vs ARDS.  ECMO out, IABP placed.  Leukocytosis    Fever, rising leucocytosis, SIRS/sepsis, ERIC  S/P meropenem, vanco and diflucan  - repeat UC and BC sent-NGTD    ct scan with improving pancreatitis   sp mv clip     cultures are negative    to follow off ab and reculture for any clinical change  Intermittent fever, improved WBC    Brent Del Rio MD  pager [phone]  office [phone]  Over the weekend please call [phone] 62M CAD, CABG, DM, HTN, chol admitted with pancreatitis not necrotizing on the CT scan and cholecystitis.  Sent to SICU but developed respiratory failure presumed to be due to ARDS. Moved to the CTU for ECMO for cardiogenic shock vs ARDS.  ECMO out, IABP placed.  Leukocytosis    Fever, rising leucocytosis, SIRS/sepsis, ERIC  S/P meropenem, vanco and diflucan  - repeat UC and BC sent-NGTD    ct scan with improving pancreatitis   sp mv clip     cultures are negative    to follow off ab and reculture for any clinical change  Intermittent fever, improved WBC    Brent Del Rio MD  pager [phone]  office [phone]  Over the weekend please call [phone]

## 2022-12-18 NOTE — PROGRESS NOTE ADULT - SUBJECTIVE AND OBJECTIVE BOX
Patient seen and examined at the bedside.    Remained critically ill on continuous ICU monitoring.    OBJECTIVE:  Vital Signs Last 24 Hrs  T(C): 37.2 (18 Dec 2022 16:00), Max: 38.1 (17 Dec 2022 23:00)  T(F): 99 (18 Dec 2022 16:00), Max: 100.5 (17 Dec 2022 23:00)  HR: 100 (18 Dec 2022 17:29) (83 - 122)  BP: 94/55 (18 Dec 2022 06:45) (92/54 - 126/61)  BP(mean): 72 (18 Dec 2022 06:45) (69 - 88)  RR: 33 (18 Dec 2022 17:15) (12 - 35)  SpO2: 100% (18 Dec 2022 17:29) (96% - 100%)    Parameters below as of 18 Dec 2022 17:29  Patient On (Oxygen Delivery Method): tracheostomy collar        REVIEW OF SYSTEMS:  Gen: No fever  EYES/ENT: No visual changes;  No vertigo or throat pain   NECK: No pain   RES:  No shortness of breath or Cough  CARD: No chest pain   GI: No abdominal pain  : No dysuria  NEURO: No weakness  SKIN: No itching, rashes    Physical Exam:  General: NAD   Neurology: nonfocal   Eyes: bilateral pupils equal and reactive   ENT/Neck: +trach, Neck supple, trachea midline, No JVD   Respiratory: rhonchi bilaterally   CV: S1S2, no murmurs        [x] Sinus Tachycardic  Abdominal: Softly distended,+BS   Extremities: 1+ pedal edema noted, + peripheral pulses palpable, warm  Skin: No Rashes, Hematoma, Ecchymosis                                                 Assessment:    Plan:   ***Neuro***  CT head showed 4mm subdural hematoma 11/26: repeat CT head unchanged 12/01  Soft palate laceration, ENT scoped 12/8, stable, no acute intervention at this time  12/12 acute change in mental status, shaking --- CTH no change, EEG neg, CTA H/N neg for LVO, + low watershed infarct to L MCA  Repeat Head CT 12/13 Similar minimal increased density along the right aspect of the posterior   falx and right tentorial leaf, consistent with minimal residual subdural   hemorrhage.  No parenchymal hemorrhage or brain edema.  [x] Nonfocal       ***Cardiovascular***  12/16 TTE during mitral clip EF 28%, normal RV, MR still mod (+2)  Invasive hemodynamic monitoring, assess perfusion indices   ST / CVP 11/ MAP 73/  SvO2 70.5/ Hct 29%/ Lactate 1.5  [x] Vasopressin 0.03 U/Min   Volume: [x] Albumin 100cc  [x] PRBC 2U given yesterday  Reassessment of hemodynamics post resuscitation   Amiodarone for rate control   [x] AC therapy with IV Heparin  [x] VTE prophylaxis with SubQ Heparin   [x] Statin [x] ASA   Serial EKG and cardiac enzymes     ***Pulmonary***  [x] Trach collar 50%  Titration of FiO2, follow SpO2, CXR, blood gases       ***GI***  [x] Diet: Tolerating TF Glucerna, @ goal 65cc/hr.  [x] Protonix     ***Renal***  GFR 43/ [x] ERIC on CRRT   Continue to monitor I/Os, BUN/Creatinine.   Replete lytes PRN  De Dios present [ x] negative       ***ID***  No active antibiotic coverage   BCx 12/3, 12/8, 12/15, 12/10 NGTD   SCx 12/3, 12/8, 12/15, 12/10 NGTD       ***Endocrine***   [x] DM2  HbA1c 7.3%               - [x] Insulin gtt               - Need tight glycemic control to prevent wound infection      Patient requires continuous monitoring with bedside rhythm monitoring, pulse oximetry monitoring, and continuous central venous and arterial pressure monitoring; and intermittent blood gas analysis. Care plan discussed with the ICU care team.   Patient remained critical, at risk for life threatening decompensation.    I have spent 45 minutes providing critical care management to this patient.    By signing my name below, I, Leela Avila, attest that this documentation has been prepared under the direction and in the presence of Deedee Carr MD   Electronically signed: Brian Oakes, 12-18-22 @ 17:36    I, Deedee Carr, personally performed the services described in this documentation. all medical record entries made by the scribe were at my direction and in my presence. I have reviewed the chart and agree that the record reflects my personal performance and is accurate and complete  Electronically signed: Deedee Carr MD  Patient seen and examined at the bedside.    Remained critically ill on continuous ICU monitoring.    OBJECTIVE:  Vital Signs Last 24 Hrs  T(C): 37.2 (18 Dec 2022 16:00), Max: 38.1 (17 Dec 2022 23:00)  T(F): 99 (18 Dec 2022 16:00), Max: 100.5 (17 Dec 2022 23:00)  HR: 100 (18 Dec 2022 17:29) (83 - 122)  BP: 94/55 (18 Dec 2022 06:45) (92/54 - 126/61)  BP(mean): 72 (18 Dec 2022 06:45) (69 - 88)  RR: 33 (18 Dec 2022 17:15) (12 - 35)  SpO2: 100% (18 Dec 2022 17:29) (96% - 100%)    Parameters below as of 18 Dec 2022 17:29  Patient On (Oxygen Delivery Method): tracheostomy collar        REVIEW OF SYSTEMS:  N/A     Physical Exam:  General: in bed with multiple lines , gtt & on CRRT  Neurology: nonfocal moves all 4 on commands   Eyes: bilateral pupils equal and reactive   ENT/Neck: +trach, Neck supple, trachea midline, No JVD   Respiratory: rhonchi bilaterally   CV: S1S2, + holosystolic murmur         [x] Sinus Tachycardic  Abdominal: Softly distended   Extremities: 1+ pedal edema noted, + peripheral pulses palpable, warm  Skin: No Rashes, Hematoma, Ecchymosis                                                 Assessment:  62 yr male DM2 / HLD / HTN / CAD S/P CABG X 3 2019 / S/P NSTEMI Cardiopulmonary arrest s/p [facility] ECMO 11/25-12/8 / Respiratory & renal failure / S/P Trac / s/p Kirstie clip for severe MR / A-Flutter / NSVT / S/P IABP removal 12/17   Anemia / thrombocytopenia  Gall stone Pancreatitis   Febrile   Plan:   ***Neuro***  CT head showed 4mm subdural hematoma 11/26: repeat CT head unchanged 12/01  Soft palate laceration, ENT scoped 12/8, stable, no acute intervention at this time  12/12 acute change in mental status, shaking --- CTH no change, EEG neg, CTA H/N neg for LVO, + low watershed infarct to L MCA  Repeat Head CT 12/13 Similar minimal increased density along the right aspect of the posterior   falx and right tentorial leaf, consistent with minimal residual subdural   hemorrhage.  No parenchymal hemorrhage or brain edema.  [x] Nonfocal       ***Cardiovascular***  Cardiogenic shock / s/p Arrest / multivessel CAD / Severe LV systolic failure w moderate MR / S/P Kirstie clip / S/P MCS   Electrical instability requiring Amiodarone infusion   12/16 GRZEGORZ during mitral clip EF 28%, normal RV, MR still mod (+2)  Invasive hemodynamic monitoring, assess perfusion indices     / CVP 11/ MAP 73/  SvO2 70.5/ Hct 29%/ Lactate 1.5  [x] Vasopressin 0.03 U/Min [X] Amiodarone 0.5 mg/min [x] Primacor weaned to off    Plan to maintain net negative Fluid balance  on CVVHDF   Volume: [x] Albumin 100cc  [x] PRBC 2U given yesterday  Replete K / maintain K >4 Mg >2     [x] VTE prophylaxis with Sub Q Heparin   [x] Statin [x] ASA       ***Pulmonary***  CXR + congestive changes   [x] Trach collar 50%   Trac care   Plan AC mode ventilator  support post SBT   Titration of FiO2, follow SpO2, CXR, blood gases       ***GI***  [x] Diet: Tolerating TF Glucerna, @ goal 65cc/hr.  [x] Protonix     ***Renal***  GFR 43/ [x] ERIC on CRRT   Continue to monitor I/Os, BUN/Creatinine.   Replete lytes PRN  De Dios present [ x] negative       ***ID***  Pan cultured   F/u ID recommendation   No active antibiotic coverage   BCx 12/3, 12/8, 12/15, 12/10 NGTD   SCx 12/3, 12/8, 12/15, 12/10 NGTD       ***Endocrine***   [x] DM2  HbA1c 7.3%               - [x] Insulin gtt               - Need tight glycemic control to prevent wound infection      Patient requires continuous monitoring with bedside rhythm monitoring, pulse oximetry monitoring, and continuous central venous and arterial pressure monitoring; and intermittent blood gas analysis. Care plan discussed with the ICU care team.   Patient remained critical, at risk for life threatening decompensation.    I have spent 45 minutes providing critical care management to this patient.    By signing my name below, I, Leela Bakirova, attest that this documentation has been prepared under the direction and in the presence of Deedee Carr MD   Electronically signed: Brian Oakes, 12-18-22 @ 17:36    I, Deedee Carr, personally performed the services described in this documentation. all medical record entries made by the scribe were at my direction and in my presence. I have reviewed the chart and agree that the record reflects my personal performance and is accurate and complete  Electronically signed: Deedee Carr MD

## 2022-12-18 NOTE — PROGRESS NOTE ADULT - ATTENDING COMMENTS
Pt well known to me.  CRRT + mechanical ventilation, sedated.  RN indicates need for warming blanket  1.  ARF--CRRT orders reviewed with fellow, critical care RN.  K_, PO4 optimized with current regimen  2.  CHF--UF, milrinone vs other inotropic vasodilator rx. Appreciate heart failure team input and will optimize volume removal-->net even as tolerated given valvular and structural cardiac dysfunction  3.  HYpothermia--modest.  With next filter replacement would change prizmaflex device--> prizmax with blood warmer.      discussed with CTU team  Bolivar Morrow MD  contact me on TEAMS

## 2022-12-18 NOTE — PROGRESS NOTE ADULT - SUBJECTIVE AND OBJECTIVE BOX
[facility] Division of Kidney Diseases & Hypertension  FOLLOW UP NOTE  [phone]--------------------------------------------------------------------------------  Chief Complaint:Acute pancreatitis without infection or necrosis      HPI:  63 y/o male with PMH of CABG, DM, HTN, HLD presenting as transfer from [place] for c/f STEMI. Course c/b gallstone pancreatitis leading to ARDS and shock & cardiac arrest now on [facility]. Had significant troponin elevation and his LVEF down to 8%. Pt was deemed to be too unstable to have an angiogram and potential PCI due to his co-morbidities & a new subdural bleed. Pt being seen for ERIC. SCr on arrival was 2.15; trended down to hector 1.6 on 11/25 and eventually increased to 3.95 11/28. Pt received IV diuretics as per CTU.    Pt initiated on CRRT on 12/5/22 to optimize volume status and electrolytes. Pt tolerating CRRT overnight with target net negative fluid balance. Pt underwent decannulation of ECMO on 12/8/22. Pt was restarted overnight on 12/9/22 for volume overload. Pt with clotting of CRRT circuit on 12/14 and was initiated on heparin with CRRT circuit.  Pt tolerating CRRT with no clotting of filter. Pt underwent mitral clip OR (12/16/22).  s/p trach on 12/16       Patient seen & examined today. Pt is intubated, unable to obtain ROS. Remains trach'd with FiO2 50%. CRRT without any issue, being kept net even. Started on vaso & chace. Pt remains anuric. Remains on milrinone gtt & IABP. CVP 11.           PAST HISTORY  --------------------------------------------------------------------------------  No significant changes to PMH, PSH, FHx, SHx, unless otherwise noted    ALLERGIES & MEDICATIONS  --------------------------------------------------------------------------------  Allergies    No Known Allergies    Intolerances      Standing Inpatient Medications  albuterol/ipratropium for Nebulization 3 milliLiter(s) Nebulizer every 6 hours  aMIOdarone    Tablet 200 milliGRAM(s) Oral daily  aMIOdarone Infusion 0.5 mG/Min IV Continuous <Continuous>  aspirin  chewable 81 milliGRAM(s) Oral daily  atorvastatin 40 milliGRAM(s) Oral at bedtime  chlorhexidine 0.12% Liquid 15 milliLiter(s) Oral Mucosa every 12 hours  chlorhexidine 4% Liquid 1 Application(s) Topical <User Schedule>  CRRT Treatment    <Continuous>  heparin   Injectable 5000 Unit(s) SubCutaneous every 8 hours  heparin  Infusion Syringe 300 Unit(s)/Hr CRRT <Continuous>  insulin regular Infusion 3 Unit(s)/Hr IV Continuous <Continuous>  milrinone Infusion 0.1 MICROgram(s)/kG/Min IV Continuous <Continuous>  multivitamin/minerals/iron Oral Solution (CENTRUM) 15 milliLiter(s) Oral daily  pantoprazole  Injectable 40 milliGRAM(s) IV Push daily  Phoxillum Filtration BK 4 / 2.5 5000 milliLiter(s) CRRT <Continuous>  Phoxillum Filtration BK 4 / 2.5 5000 milliLiter(s) CRRT <Continuous>  PrismaSOL Filtration BGK 4 / 2.5 5000 milliLiter(s) CRRT <Continuous>  thiamine 100 milliGRAM(s) Enteral Tube daily  vasopressin Infusion 0.02 Unit(s)/Min IV Continuous <Continuous>    PRN Inpatient Medications  acetaminophen    Suspension .. 650 milliGRAM(s) Oral every 6 hours PRN  sodium chloride 0.9% lock flush 10 milliLiter(s) IV Push every 1 hour PRN      REVIEW OF SYSTEMS  --------------------------------------------------------------------------------  unable to obtain    VITALS/PHYSICAL EXAM  --------------------------------------------------------------------------------  T(C): 37.1 (12-18-22 @ 06:00), Max: 38.1 (12-17-22 @ 23:00)  HR: 114 (12-18-22 @ 07:30) (70 - 122)  BP: 94/55 (12-18-22 @ 06:45) (91/52 - 126/61)  RR: 24 (12-18-22 @ 07:30) (12 - 32)  SpO2: 100% (12-18-22 @ 07:30) (91% - 100%)  Wt(kg): --  Height (cm): 172.7 (12-16-22 @ 16:25)  Weight (kg): 92.9 (12-16-22 @ 16:25)  BMI (kg/m2): 31.1 (12-16-22 @ 16:25)  BSA (m2): 2.06 (12-16-22 @ 16:25)      12-17-22 @ 07:01  -  12-18-22 @ 07:00  --------------------------------------------------------  IN: 3042.8 mL / OUT: 2636 mL / NET: 406.8 mL    12-18-22 @ 07:01  -  12-18-22 @ 08:55  --------------------------------------------------------  IN: 106.1 mL / OUT: 184 mL / NET: -77.9 mL      Physical Exam:  	Gen: ill appearing male, intubated   	HEENT: Anicteric  	Pulm:  trach+  	CV: S1S2+  	Abd: Soft, +BS       	Ext: LE edema B/L++  	Neuro: Sedated  	Skin: Warm and dry             Acces: RIJ non tunneled HD catheter, +IABP      LABS/STUDIES  --------------------------------------------------------------------------------              8.6    10.43 >-----------<  90       [12-18-22 @ 02:21]              28.2     134  |  101  |  25  ----------------------------<  106      [12-18-22 @ 02:16]  3.5   |  20  |  1.75        Ca     8.0     [12-18-22 @ 02:16]      Mg     2.7     [12-18-22 @ 02:16]      Phos  3.8     [12-18-22 @ 02:16]    TPro  6.3  /  Alb  2.9  /  TBili  0.5  /  DBili  x   /  AST  31  /  ALT  12  /  AlkPhos  98  [12-18-22 @ 02:16]    PT/INR: PT 11.6 , INR 1.01       [12-17-22 @ 00:12]  PTT: 27.2       [12-18-22 @ 02:21]    CK 27      [12-16-22 @ 10:55]  LDH 39      [12-18-22 @ 02:16]    Creatinine Trend:  SCr 1.75 [12-18 @ 02:16]  SCr 1.41 [12-17 @ 00:12]  SCr 1.88 [12-16 @ 10:55]  SCr 1.41 [12-16 @ 00:23]  SCr 1.63 [12-15 @ 00:46]    Urinalysis - [12-16-22 @ 04:28]      Color DK BROWN / Appearance Turbid / SG 1.029 / pH 6.0      Gluc Trace / Ketone Negative  / Bili Negative / Urobili 6 mg/dL       Blood Large / Protein 300 mg/dL / Leuk Est Large / Nitrite Negative       /  / Hyaline 85 / Gran  / Sq Epi  / Non Sq Epi 8 / Bacteria Moderate           [facility] Division of Kidney Diseases & Hypertension  FOLLOW UP NOTE  [phone]--------------------------------------------------------------------------------  Chief Complaint:Acute pancreatitis without infection or necrosis      HPI:  63 y/o male with PMH of CABG, DM, HTN, HLD presenting as transfer from [place] for c/f STEMI. Course c/b gallstone pancreatitis leading to ARDS and shock & cardiac arrest now on [facility]. Had significant troponin elevation and his LVEF down to 8%. Pt was deemed to be too unstable to have an angiogram and potential PCI due to his co-morbidities & a new subdural bleed. Pt being seen for ERIC. SCr on arrival was 2.15; trended down to hector 1.6 on 11/25 and eventually increased to 3.95 11/28. Pt received IV diuretics as per CTU.    Pt initiated on CRRT on 12/5/22 to optimize volume status and electrolytes. Pt tolerating CRRT overnight with target net negative fluid balance. Pt underwent decannulation of ECMO on 12/8/22. Pt was restarted overnight on 12/9/22 for volume overload. Pt with clotting of CRRT circuit on 12/14 and was initiated on heparin with CRRT circuit.  Pt tolerating CRRT with no clotting of filter. Pt underwent mitral clip OR (12/16/22).  s/p trach on 12/16       Patient seen & examined today. Pt is intubated, unable to obtain ROS. Remains trach'd with FiO2 50%. CRRT without any issue, being kept net even. Started on vaso & chace. Pt remains anuric. Remains on milrinone gtt & IABP. CVP 11.           PAST HISTORY  --------------------------------------------------------------------------------  No significant changes to PMH, PSH, FHx, SHx, unless otherwise noted    ALLERGIES & MEDICATIONS  --------------------------------------------------------------------------------  Allergies    No Known Allergies    Intolerances      Standing Inpatient Medications  albuterol/ipratropium for Nebulization 3 milliLiter(s) Nebulizer every 6 hours  aMIOdarone    Tablet 200 milliGRAM(s) Oral daily  aMIOdarone Infusion 0.5 mG/Min IV Continuous <Continuous>  aspirin  chewable 81 milliGRAM(s) Oral daily  atorvastatin 40 milliGRAM(s) Oral at bedtime  chlorhexidine 0.12% Liquid 15 milliLiter(s) Oral Mucosa every 12 hours  chlorhexidine 4% Liquid 1 Application(s) Topical <User Schedule>  CRRT Treatment    <Continuous>  heparin   Injectable 5000 Unit(s) SubCutaneous every 8 hours  heparin  Infusion Syringe 300 Unit(s)/Hr CRRT <Continuous>  insulin regular Infusion 3 Unit(s)/Hr IV Continuous <Continuous>  milrinone Infusion 0.1 MICROgram(s)/kG/Min IV Continuous <Continuous>  multivitamin/minerals/iron Oral Solution (CENTRUM) 15 milliLiter(s) Oral daily  pantoprazole  Injectable 40 milliGRAM(s) IV Push daily  Phoxillum Filtration BK 4 / 2.5 5000 milliLiter(s) CRRT <Continuous>  Phoxillum Filtration BK 4 / 2.5 5000 milliLiter(s) CRRT <Continuous>  PrismaSOL Filtration BGK 4 / 2.5 5000 milliLiter(s) CRRT <Continuous>  thiamine 100 milliGRAM(s) Enteral Tube daily  vasopressin Infusion 0.02 Unit(s)/Min IV Continuous <Continuous>    PRN Inpatient Medications  acetaminophen    Suspension .. 650 milliGRAM(s) Oral every 6 hours PRN  sodium chloride 0.9% lock flush 10 milliLiter(s) IV Push every 1 hour PRN      REVIEW OF SYSTEMS  --------------------------------------------------------------------------------  unable to obtain    VITALS/PHYSICAL EXAM  --------------------------------------------------------------------------------  T(C): 37.1 (12-18-22 @ 06:00), Max: 38.1 (12-17-22 @ 23:00)  HR: 114 (12-18-22 @ 07:30) (70 - 122)  BP: 94/55 (12-18-22 @ 06:45) (91/52 - 126/61)  RR: 24 (12-18-22 @ 07:30) (12 - 32)  SpO2: 100% (12-18-22 @ 07:30) (91% - 100%)  Wt(kg): --  Height (cm): 172.7 (12-16-22 @ 16:25)  Weight (kg): 92.9 (12-16-22 @ 16:25)  BMI (kg/m2): 31.1 (12-16-22 @ 16:25)  BSA (m2): 2.06 (12-16-22 @ 16:25)      12-17-22 @ 07:01  -  12-18-22 @ 07:00  --------------------------------------------------------  IN: 3042.8 mL / OUT: 2636 mL / NET: 406.8 mL    12-18-22 @ 07:01  -  12-18-22 @ 08:55  --------------------------------------------------------  IN: 106.1 mL / OUT: 184 mL / NET: -77.9 mL      Physical Exam:  	Gen: ill appearing male, intubated   	HEENT: Anicteric  	Pulm:  trach+  	CV: S1S2+  	Abd: Soft, +BS       	Ext: LE edema B/L++  	Neuro: Sedated  	Skin: Warm and dry             Acces: RIJ non tunneled HD catheter, +IABP      LABS/STUDIES  --------------------------------------------------------------------------------              8.6    10.43 >-----------<  90       [12-18-22 @ 02:21]              28.2     134  |  101  |  25  ----------------------------<  106      [12-18-22 @ 02:16]  3.5   |  20  |  1.75        Ca     8.0     [12-18-22 @ 02:16]      Mg     2.7     [12-18-22 @ 02:16]      Phos  3.8     [12-18-22 @ 02:16]    TPro  6.3  /  Alb  2.9  /  TBili  0.5  /  DBili  x   /  AST  31  /  ALT  12  /  AlkPhos  98  [12-18-22 @ 02:16]    PT/INR: PT 11.6 , INR 1.01       [12-17-22 @ 00:12]  PTT: 27.2       [12-18-22 @ 02:21]    CK 27      [12-16-22 @ 10:55]  LDH 39      [12-18-22 @ 02:16]    Creatinine Trend:  SCr 1.75 [12-18 @ 02:16]  SCr 1.41 [12-17 @ 00:12]  SCr 1.88 [12-16 @ 10:55]  SCr 1.41 [12-16 @ 00:23]  SCr 1.63 [12-15 @ 00:46]    Urinalysis - [12-16-22 @ 04:28]      Color DK BROWN / Appearance Turbid / SG 1.029 / pH 6.0      Gluc Trace / Ketone Negative  / Bili Negative / Urobili 6 mg/dL       Blood Large / Protein 300 mg/dL / Leuk Est Large / Nitrite Negative       /  / Hyaline 85 / Gran  / Sq Epi  / Non Sq Epi 8 / Bacteria Moderate           [facility] Division of Kidney Diseases & Hypertension  FOLLOW UP NOTE  [phone]--------------------------------------------------------------------------------  Chief Complaint:Acute pancreatitis without infection or necrosis      HPI:  63 y/o male with PMH of CABG, DM, HTN, HLD presenting as transfer from [place] for c/f STEMI. Course c/b gallstone pancreatitis leading to ARDS and shock & cardiac arrest now on [facility]. Had significant troponin elevation and his LVEF down to 8%. Pt was deemed to be too unstable to have an angiogram and potential PCI due to his co-morbidities & a new subdural bleed. Pt being seen for ERIC. SCr on arrival was 2.15; trended down to hector 1.6 on 11/25 and eventually increased to 3.95 11/28. Pt received IV diuretics as per CTU.    Pt initiated on CRRT on 12/5/22 to optimize volume status and electrolytes. Pt tolerating CRRT overnight with target net negative fluid balance. Pt underwent decannulation of ECMO on 12/8/22. Pt was restarted overnight on 12/9/22 for volume overload. Pt with clotting of CRRT circuit on 12/14 and was initiated on heparin with CRRT circuit.  Pt tolerating CRRT with no clotting of filter. Pt underwent mitral clip OR (12/16/22).  s/p trach on 12/16       Patient seen & examined today. Pt is intubated, unable to obtain ROS. Remains trach'd with FiO2 50%. CRRT without any issue, being kept net even. Started on vaso & chace. Pt remains anuric. Remains on milrinone gtt & IABP. CVP 11.           PAST HISTORY  --------------------------------------------------------------------------------  No significant changes to PMH, PSH, FHx, SHx, unless otherwise noted    ALLERGIES & MEDICATIONS  --------------------------------------------------------------------------------  Allergies    No Known Allergies    Intolerances      Standing Inpatient Medications  albuterol/ipratropium for Nebulization 3 milliLiter(s) Nebulizer every 6 hours  aMIOdarone    Tablet 200 milliGRAM(s) Oral daily  aMIOdarone Infusion 0.5 mG/Min IV Continuous <Continuous>  aspirin  chewable 81 milliGRAM(s) Oral daily  atorvastatin 40 milliGRAM(s) Oral at bedtime  chlorhexidine 0.12% Liquid 15 milliLiter(s) Oral Mucosa every 12 hours  chlorhexidine 4% Liquid 1 Application(s) Topical <User Schedule>  CRRT Treatment    <Continuous>  heparin   Injectable 5000 Unit(s) SubCutaneous every 8 hours  heparin  Infusion Syringe 300 Unit(s)/Hr CRRT <Continuous>  insulin regular Infusion 3 Unit(s)/Hr IV Continuous <Continuous>  milrinone Infusion 0.1 MICROgram(s)/kG/Min IV Continuous <Continuous>  multivitamin/minerals/iron Oral Solution (CENTRUM) 15 milliLiter(s) Oral daily  pantoprazole  Injectable 40 milliGRAM(s) IV Push daily  Phoxillum Filtration BK 4 / 2.5 5000 milliLiter(s) CRRT <Continuous>  Phoxillum Filtration BK 4 / 2.5 5000 milliLiter(s) CRRT <Continuous>  PrismaSOL Filtration BGK 4 / 2.5 5000 milliLiter(s) CRRT <Continuous>  thiamine 100 milliGRAM(s) Enteral Tube daily  vasopressin Infusion 0.02 Unit(s)/Min IV Continuous <Continuous>    PRN Inpatient Medications  acetaminophen    Suspension .. 650 milliGRAM(s) Oral every 6 hours PRN  sodium chloride 0.9% lock flush 10 milliLiter(s) IV Push every 1 hour PRN      REVIEW OF SYSTEMS  --------------------------------------------------------------------------------  unable to obtain    VITALS/PHYSICAL EXAM  --------------------------------------------------------------------------------  T(C): 37.1 (12-18-22 @ 06:00), Max: 38.1 (12-17-22 @ 23:00)  HR: 114 (12-18-22 @ 07:30) (70 - 122)  BP: 94/55 (12-18-22 @ 06:45) (91/52 - 126/61)  RR: 24 (12-18-22 @ 07:30) (12 - 32)  SpO2: 100% (12-18-22 @ 07:30) (91% - 100%)  Wt(kg): --  Height (cm): 172.7 (12-16-22 @ 16:25)  Weight (kg): 92.9 (12-16-22 @ 16:25)  BMI (kg/m2): 31.1 (12-16-22 @ 16:25)  BSA (m2): 2.06 (12-16-22 @ 16:25)      12-17-22 @ 07:01  -  12-18-22 @ 07:00  --------------------------------------------------------  IN: 3042.8 mL / OUT: 2636 mL / NET: 406.8 mL    12-18-22 @ 07:01  -  12-18-22 @ 08:55  --------------------------------------------------------  IN: 106.1 mL / OUT: 184 mL / NET: -77.9 mL      Physical Exam:  	Gen: ill appearing male, intubated   	HEENT: Anicteric  	Pulm:  trach+  	CV: S1S2+  	Abd: Soft, +BS       	Ext: LE edema B/L++  	Neuro: Sedated  	Skin: Warm and dry             Acces: RIJ non tunneled HD catheter, +IABP      LABS/STUDIES  --------------------------------------------------------------------------------              8.6    10.43 >-----------<  90       [12-18-22 @ 02:21]              28.2     134  |  101  |  25  ----------------------------<  106      [12-18-22 @ 02:16]  3.5   |  20  |  1.75        Ca     8.0     [12-18-22 @ 02:16]      Mg     2.7     [12-18-22 @ 02:16]      Phos  3.8     [12-18-22 @ 02:16]    TPro  6.3  /  Alb  2.9  /  TBili  0.5  /  DBili  x   /  AST  31  /  ALT  12  /  AlkPhos  98  [12-18-22 @ 02:16]    PT/INR: PT 11.6 , INR 1.01       [12-17-22 @ 00:12]  PTT: 27.2       [12-18-22 @ 02:21]    CK 27      [12-16-22 @ 10:55]  LDH 39      [12-18-22 @ 02:16]    Creatinine Trend:  SCr 1.75 [12-18 @ 02:16]  SCr 1.41 [12-17 @ 00:12]  SCr 1.88 [12-16 @ 10:55]  SCr 1.41 [12-16 @ 00:23]  SCr 1.63 [12-15 @ 00:46]    Urinalysis - [12-16-22 @ 04:28]      Color DK BROWN / Appearance Turbid / SG 1.029 / pH 6.0      Gluc Trace / Ketone Negative  / Bili Negative / Urobili 6 mg/dL       Blood Large / Protein 300 mg/dL / Leuk Est Large / Nitrite Negative       /  / Hyaline 85 / Gran  / Sq Epi  / Non Sq Epi 8 / Bacteria Moderate

## 2022-12-18 NOTE — PROGRESS NOTE ADULT - SUBJECTIVE AND OBJECTIVE BOX
Patient is a 62y old  Male who presents with a chief complaint of Acute cholecystitis, gallstone pancreatitis (18 Dec 2022 08:19)    Being followed by ID for Pncreatitis    Interval history:  Intermittent fever  No acute events      ROS:  Unable due to intubation, sedation      Antimicrobials:        Vital Signs Last 24 Hrs  T(C): 37.3 (12-18-22 @ 12:00), Max: 38.1 (12-17-22 @ 23:00)  T(F): 99.1 (12-18-22 @ 12:00), Max: 100.5 (12-17-22 @ 23:00)  HR: 92 (12-18-22 @ 11:45) (70 - 122)  BP: 94/55 (12-18-22 @ 06:45) (91/52 - 126/61)  BP(mean): 72 (12-18-22 @ 06:45) (67 - 88)  RR: 16 (12-18-22 @ 11:45) (12 - 32)  SpO2: 100% (12-18-22 @ 11:45) (98% - 100%)    Physical Exam:    Constitutional well preserved, NAD    HEENT EOMI, No pallor or icterus, ETT    No oral exudate or erythema    Neck supple no LN    Chest Clear to auscultation, S/P sternotomy    CVS RRR S1 S2 WNl No murmur or rub or gallop    Abd soft BS normal No tenderness no masses    Ext No cyanosis clubbing or edema    IV site no erythema tenderness or discharge, LIJ    Joints no swelling or LOM    CNS Sedated,  non focal    Lab Data:                          8.6    10.43 )-----------( 90       ( 18 Dec 2022 02:21 )             28.2       12-18    134<L>  |  101  |  25<H>  ----------------------------<  106<H>  3.5   |  20<L>  |  1.75<H>    Ca    8.0<L>      18 Dec 2022 02:16  Phos  3.8     12-18  Mg     2.7     12-18    TPro  6.3  /  Alb  2.9<L>  /  TBili  0.5  /  DBili  x   /  AST  31  /  ALT  12  /  AlkPhos  98  12-18        .Sputum Sputum  12-15-22   Normal Respiratory Christy present  --    Few polymorphonuclear leukocytes per low power field  Rare Squamous epithelial cells per low power field  Rare Yeast per oil power field      .Blood Blood-Peripheral  12-15-22   No growth to date.  --  --      .Blood Blood-Peripheral  12-15-22   No growth to date.  --  --    < from: Xray Chest 1 View- PORTABLE-Routine (12.18.22 @ 02:21) >  ACC: [id number] EXAM:  XR CHEST PORTABLE ROUTINE 1V                          PROCEDURE DATE:  12/18/2022          INTERPRETATION:  EXAMINATION: XR CHEST    CLINICAL INDICATION: Post-cardiac surgery    TECHNIQUE: Single frontal portable view of the chest from 12/18/2022 2:21   AM    COMPARISON: Chest x-ray 12/17/2022.    FINDINGS:  Adjustment is visualized. Enteric tube seen coursing through diaphragm   and entering stomach, tip is not visualized. Left IJ central venous   catheter with tip at the cavoatrial junction.. Right subclavian central   venous catheter with tip at the right atrium.  Sternotomy wires are intact.  The heart size is normal.  Slight worsening of bilateral airspace opacities.  There is no pneumothorax or pleural effusion.    IMPRESSION:  Lines and tubes as above.  Slight worsening of bilateral airspace opacities, likely related to   pulmonary vascular congestion/edema.    < end of copied text >  < from: CT Abdomen and Pelvis No Cont (12.11.22 @ 08:59) >  ACC: [id number] EXAM:  CT ABDOMEN AND PELVIS                        ACC: [id number] EXAM:  CT CHEST                          PROCEDURE DATE:  12/11/2022          INTERPRETATION:  CLINICAL INFORMATION: Pancreatitis. Cardiogenic shock.    COMPARISON: December 6, 2022.    CONTRAST/COMPLICATIONS:  IV Contrast: NONE  Oral Contrast: NONE  Complications: None reported at time of study completion    PROCEDURE:  CT of the Chest, Abdomen and Pelvis was performed. Respiratory motion   artifact.  Sagittal and coronal reformats were performed.    FINDINGS:  CHEST:  LUNGS AND LARGE AIRWAYS: Endotracheal tube in place. Patent central   airways. There are worsening patchy consolidative opacities throughout   the lungs, most prominent in the right middle lobe. There is moderate   bilateral lower lobe dependent atelectasis. There is mild smooth   thickening of the interlobular septa throughout the lungs with subtle   geographic groundglass opacification.  PLEURA: There are mild bilateral layering pleural effusions. There is no   pneumothorax or pneumomediastinum.  VESSELS: Right internal jugular dual-lumen central venous catheter.   Within normal limits.  HEART: Heart size is normal. No pericardial effusion.  MEDIASTINUM AND GERTRUDE: No lymphadenopathy.  CHEST WALL AND LOWER NECK: Within normal limits.    ABDOMEN AND PELVIS:  LIVER: Within normal limits.  BILE DUCTS: Normal caliber.  GALLBLADDER: Small gallstones in the neck. The gallbladder is partially   contracted.  SPLEEN: Within normal limits.  PANCREAS: Again is noted fullness of the pancreatic tail. There is   slightly improved peripancreatic stranding adjacent to the tail. No   hyperattenuating collection is identified to suggest acute hemorrhage.  ADRENALS: Within normal limits.  KIDNEYS/URETERS: Mild bilateral perinephric stranding. Otherwise, within   normal limits.    BLADDER: De Dios catheter in the collapsed bladder.  REPRODUCTIVE ORGANS: The prostate is not enlarged.    BOWEL: Dobbhoff tube in the distal stomach.. No bowel obstruction.   Appendix is normal.  PERITONEUM: Trace free fluid in the peritoneal cavity.  VESSELS: Left common femoral artery intra-aortic balloon pump catheter   extending to the level of the emeterio. Otherwise, within normal limits.  RETROPERITONEUM/LYMPH NODES: No lymphadenopathy.  ABDOMINAL WALL: Right groin cutaneous drain.  BONES: Within normal limits.    IMPRESSION: Worsening bilateral patchy consolidative opacities. Mild   bilateral layering pleural effusions with bilateral lower lobe dependent   compressive atelectasis. Slightly improved peripancreatic stranding.    < end of copied text >                       Patient is a 62y old  Male who presents with a chief complaint of Acute cholecystitis, gallstone pancreatitis (18 Dec 2022 08:19)    Being followed by ID for Pncreatitis    Interval history:  Intermittent fever  No acute events      ROS:  Unable due to intubation, sedation      Antimicrobials:        Vital Signs Last 24 Hrs  T(C): 37.3 (12-18-22 @ 12:00), Max: 38.1 (12-17-22 @ 23:00)  T(F): 99.1 (12-18-22 @ 12:00), Max: 100.5 (12-17-22 @ 23:00)  HR: 92 (12-18-22 @ 11:45) (70 - 122)  BP: 94/55 (12-18-22 @ 06:45) (91/52 - 126/61)  BP(mean): 72 (12-18-22 @ 06:45) (67 - 88)  RR: 16 (12-18-22 @ 11:45) (12 - 32)  SpO2: 100% (12-18-22 @ 11:45) (98% - 100%)    Physical Exam:    Constitutional well preserved, NAD    HEENT EOMI, No pallor or icterus, ETT    No oral exudate or erythema    Neck supple no LN    Chest Clear to auscultation, S/P sternotomy    CVS RRR S1 S2 WNl No murmur or rub or gallop    Abd soft BS normal No tenderness no masses    Ext No cyanosis clubbing or edema    IV site no erythema tenderness or discharge, LIJ    Joints no swelling or LOM    CNS Sedated,  non focal    Lab Data:                          8.6    10.43 )-----------( 90       ( 18 Dec 2022 02:21 )             28.2       12-18    134<L>  |  101  |  25<H>  ----------------------------<  106<H>  3.5   |  20<L>  |  1.75<H>    Ca    8.0<L>      18 Dec 2022 02:16  Phos  3.8     12-18  Mg     2.7     12-18    TPro  6.3  /  Alb  2.9<L>  /  TBili  0.5  /  DBili  x   /  AST  31  /  ALT  12  /  AlkPhos  98  12-18        .Sputum Sputum  12-15-22   Normal Respiratory Christy present  --    Few polymorphonuclear leukocytes per low power field  Rare Squamous epithelial cells per low power field  Rare Yeast per oil power field      .Blood Blood-Peripheral  12-15-22   No growth to date.  --  --      .Blood Blood-Peripheral  12-15-22   No growth to date.  --  --    < from: Xray Chest 1 View- PORTABLE-Routine (12.18.22 @ 02:21) >  ACC: [id number] EXAM:  XR CHEST PORTABLE ROUTINE 1V                          PROCEDURE DATE:  12/18/2022          INTERPRETATION:  EXAMINATION: XR CHEST    CLINICAL INDICATION: Post-cardiac surgery    TECHNIQUE: Single frontal portable view of the chest from 12/18/2022 2:21   AM    COMPARISON: Chest x-ray 12/17/2022.    FINDINGS:  Adjustment is visualized. Enteric tube seen coursing through diaphragm   and entering stomach, tip is not visualized. Left IJ central venous   catheter with tip at the cavoatrial junction.. Right subclavian central   venous catheter with tip at the right atrium.  Sternotomy wires are intact.  The heart size is normal.  Slight worsening of bilateral airspace opacities.  There is no pneumothorax or pleural effusion.    IMPRESSION:  Lines and tubes as above.  Slight worsening of bilateral airspace opacities, likely related to   pulmonary vascular congestion/edema.    < end of copied text >  < from: CT Abdomen and Pelvis No Cont (12.11.22 @ 08:59) >  ACC: [id number] EXAM:  CT ABDOMEN AND PELVIS                        ACC: [id number] EXAM:  CT CHEST                          PROCEDURE DATE:  12/11/2022          INTERPRETATION:  CLINICAL INFORMATION: Pancreatitis. Cardiogenic shock.    COMPARISON: December 6, 2022.    CONTRAST/COMPLICATIONS:  IV Contrast: NONE  Oral Contrast: NONE  Complications: None reported at time of study completion    PROCEDURE:  CT of the Chest, Abdomen and Pelvis was performed. Respiratory motion   artifact.  Sagittal and coronal reformats were performed.    FINDINGS:  CHEST:  LUNGS AND LARGE AIRWAYS: Endotracheal tube in place. Patent central   airways. There are worsening patchy consolidative opacities throughout   the lungs, most prominent in the right middle lobe. There is moderate   bilateral lower lobe dependent atelectasis. There is mild smooth   thickening of the interlobular septa throughout the lungs with subtle   geographic groundglass opacification.  PLEURA: There are mild bilateral layering pleural effusions. There is no   pneumothorax or pneumomediastinum.  VESSELS: Right internal jugular dual-lumen central venous catheter.   Within normal limits.  HEART: Heart size is normal. No pericardial effusion.  MEDIASTINUM AND GERTRUDE: No lymphadenopathy.  CHEST WALL AND LOWER NECK: Within normal limits.    ABDOMEN AND PELVIS:  LIVER: Within normal limits.  BILE DUCTS: Normal caliber.  GALLBLADDER: Small gallstones in the neck. The gallbladder is partially   contracted.  SPLEEN: Within normal limits.  PANCREAS: Again is noted fullness of the pancreatic tail. There is   slightly improved peripancreatic stranding adjacent to the tail. No   hyperattenuating collection is identified to suggest acute hemorrhage.  ADRENALS: Within normal limits.  KIDNEYS/URETERS: Mild bilateral perinephric stranding. Otherwise, within   normal limits.    BLADDER: De Dios catheter in the collapsed bladder.  REPRODUCTIVE ORGANS: The prostate is not enlarged.    BOWEL: Dobbhoff tube in the distal stomach.. No bowel obstruction.   Appendix is normal.  PERITONEUM: Trace free fluid in the peritoneal cavity.  VESSELS: Left common femoral artery intra-aortic balloon pump catheter   extending to the level of the emeterio. Otherwise, within normal limits.  RETROPERITONEUM/LYMPH NODES: No lymphadenopathy.  ABDOMINAL WALL: Right groin cutaneous drain.  BONES: Within normal limits.    IMPRESSION: Worsening bilateral patchy consolidative opacities. Mild   bilateral layering pleural effusions with bilateral lower lobe dependent   compressive atelectasis. Slightly improved peripancreatic stranding.    < end of copied text >                       Patient is a 62y old  Male who presents with a chief complaint of Acute cholecystitis, gallstone pancreatitis (18 Dec 2022 08:19)    Being followed by ID for Pncreatitis    Interval history:  Intermittent fever  No acute events      ROS:  Unable due to intubation, sedation      Antimicrobials:        Vital Signs Last 24 Hrs  T(C): 37.3 (12-18-22 @ 12:00), Max: 38.1 (12-17-22 @ 23:00)  T(F): 99.1 (12-18-22 @ 12:00), Max: 100.5 (12-17-22 @ 23:00)  HR: 92 (12-18-22 @ 11:45) (70 - 122)  BP: 94/55 (12-18-22 @ 06:45) (91/52 - 126/61)  BP(mean): 72 (12-18-22 @ 06:45) (67 - 88)  RR: 16 (12-18-22 @ 11:45) (12 - 32)  SpO2: 100% (12-18-22 @ 11:45) (98% - 100%)    Physical Exam:    Constitutional well preserved, NAD    HEENT EOMI, No pallor or icterus, ETT    No oral exudate or erythema    Neck supple no LN    Chest Clear to auscultation, S/P sternotomy    CVS RRR S1 S2 WNl No murmur or rub or gallop    Abd soft BS normal No tenderness no masses    Ext No cyanosis clubbing or edema    IV site no erythema tenderness or discharge, LIJ    Joints no swelling or LOM    CNS Sedated,  non focal    Lab Data:                          8.6    10.43 )-----------( 90       ( 18 Dec 2022 02:21 )             28.2       12-18    134<L>  |  101  |  25<H>  ----------------------------<  106<H>  3.5   |  20<L>  |  1.75<H>    Ca    8.0<L>      18 Dec 2022 02:16  Phos  3.8     12-18  Mg     2.7     12-18    TPro  6.3  /  Alb  2.9<L>  /  TBili  0.5  /  DBili  x   /  AST  31  /  ALT  12  /  AlkPhos  98  12-18        .Sputum Sputum  12-15-22   Normal Respiratory Christy present  --    Few polymorphonuclear leukocytes per low power field  Rare Squamous epithelial cells per low power field  Rare Yeast per oil power field      .Blood Blood-Peripheral  12-15-22   No growth to date.  --  --      .Blood Blood-Peripheral  12-15-22   No growth to date.  --  --    < from: Xray Chest 1 View- PORTABLE-Routine (12.18.22 @ 02:21) >  ACC: [id number] EXAM:  XR CHEST PORTABLE ROUTINE 1V                          PROCEDURE DATE:  12/18/2022          INTERPRETATION:  EXAMINATION: XR CHEST    CLINICAL INDICATION: Post-cardiac surgery    TECHNIQUE: Single frontal portable view of the chest from 12/18/2022 2:21   AM    COMPARISON: Chest x-ray 12/17/2022.    FINDINGS:  Adjustment is visualized. Enteric tube seen coursing through diaphragm   and entering stomach, tip is not visualized. Left IJ central venous   catheter with tip at the cavoatrial junction.. Right subclavian central   venous catheter with tip at the right atrium.  Sternotomy wires are intact.  The heart size is normal.  Slight worsening of bilateral airspace opacities.  There is no pneumothorax or pleural effusion.    IMPRESSION:  Lines and tubes as above.  Slight worsening of bilateral airspace opacities, likely related to   pulmonary vascular congestion/edema.    < end of copied text >  < from: CT Abdomen and Pelvis No Cont (12.11.22 @ 08:59) >  ACC: [id number] EXAM:  CT ABDOMEN AND PELVIS                        ACC: [id number] EXAM:  CT CHEST                          PROCEDURE DATE:  12/11/2022          INTERPRETATION:  CLINICAL INFORMATION: Pancreatitis. Cardiogenic shock.    COMPARISON: December 6, 2022.    CONTRAST/COMPLICATIONS:  IV Contrast: NONE  Oral Contrast: NONE  Complications: None reported at time of study completion    PROCEDURE:  CT of the Chest, Abdomen and Pelvis was performed. Respiratory motion   artifact.  Sagittal and coronal reformats were performed.    FINDINGS:  CHEST:  LUNGS AND LARGE AIRWAYS: Endotracheal tube in place. Patent central   airways. There are worsening patchy consolidative opacities throughout   the lungs, most prominent in the right middle lobe. There is moderate   bilateral lower lobe dependent atelectasis. There is mild smooth   thickening of the interlobular septa throughout the lungs with subtle   geographic groundglass opacification.  PLEURA: There are mild bilateral layering pleural effusions. There is no   pneumothorax or pneumomediastinum.  VESSELS: Right internal jugular dual-lumen central venous catheter.   Within normal limits.  HEART: Heart size is normal. No pericardial effusion.  MEDIASTINUM AND GERTRUDE: No lymphadenopathy.  CHEST WALL AND LOWER NECK: Within normal limits.    ABDOMEN AND PELVIS:  LIVER: Within normal limits.  BILE DUCTS: Normal caliber.  GALLBLADDER: Small gallstones in the neck. The gallbladder is partially   contracted.  SPLEEN: Within normal limits.  PANCREAS: Again is noted fullness of the pancreatic tail. There is   slightly improved peripancreatic stranding adjacent to the tail. No   hyperattenuating collection is identified to suggest acute hemorrhage.  ADRENALS: Within normal limits.  KIDNEYS/URETERS: Mild bilateral perinephric stranding. Otherwise, within   normal limits.    BLADDER: De Dios catheter in the collapsed bladder.  REPRODUCTIVE ORGANS: The prostate is not enlarged.    BOWEL: Dobbhoff tube in the distal stomach.. No bowel obstruction.   Appendix is normal.  PERITONEUM: Trace free fluid in the peritoneal cavity.  VESSELS: Left common femoral artery intra-aortic balloon pump catheter   extending to the level of the emeterio. Otherwise, within normal limits.  RETROPERITONEUM/LYMPH NODES: No lymphadenopathy.  ABDOMINAL WALL: Right groin cutaneous drain.  BONES: Within normal limits.    IMPRESSION: Worsening bilateral patchy consolidative opacities. Mild   bilateral layering pleural effusions with bilateral lower lobe dependent   compressive atelectasis. Slightly improved peripancreatic stranding.    < end of copied text >                       Patient is a 62y old  Male who presents with a chief complaint of Acute cholecystitis, gallstone pancreatitis (18 Dec 2022 08:19)    Being followed by ID for Pancreatitis    Interval history:  Intermittent fever  No acute events      ROS:  Lethargic  On Trach, non verbal  No complaints  Coughing        Antimicrobials:        Vital Signs Last 24 Hrs  T(C): 37.3 (12-18-22 @ 12:00), Max: 38.1 (12-17-22 @ 23:00)  T(F): 99.1 (12-18-22 @ 12:00), Max: 100.5 (12-17-22 @ 23:00)  HR: 92 (12-18-22 @ 11:45) (70 - 122)  BP: 94/55 (12-18-22 @ 06:45) (91/52 - 126/61)  BP(mean): 72 (12-18-22 @ 06:45) (67 - 88)  RR: 16 (12-18-22 @ 11:45) (12 - 32)  SpO2: 100% (12-18-22 @ 11:45) (98% - 100%)    Physical Exam:    Constitutional well preserved, NAD, heating blanket    HEENT EOMI, No pallor or icterus, Trach, blood tinged phlegm, suctioned    No oral exudate or erythema    Neck supple no LN    Chest Clear to auscultation, S/P sternotomy, left subclavian- CVVH    CVS S1 S2 WNl No murmur or rub or gallop    Abd soft BS normal No tenderness no masses    Ext No cyanosis clubbing or edema    IV site no erythema tenderness or discharge, LIJ    Joints no swelling or LOM    CNS Lethargic,  non focal    Lab Data:                          8.6    10.43 )-----------( 90       ( 18 Dec 2022 02:21 )             28.2       12-18    134<L>  |  101  |  25<H>  ----------------------------<  106<H>  3.5   |  20<L>  |  1.75<H>    Ca    8.0<L>      18 Dec 2022 02:16  Phos  3.8     12-18  Mg     2.7     12-18    TPro  6.3  /  Alb  2.9<L>  /  TBili  0.5  /  DBili  x   /  AST  31  /  ALT  12  /  AlkPhos  98  12-18        .Sputum Sputum  12-15-22   Normal Respiratory Christy present  --    Few polymorphonuclear leukocytes per low power field  Rare Squamous epithelial cells per low power field  Rare Yeast per oil power field      .Blood Blood-Peripheral  12-15-22   No growth to date.  --  --      .Blood Blood-Peripheral  12-15-22   No growth to date.  --  --    < from: Xray Chest 1 View- PORTABLE-Routine (12.18.22 @ 02:21) >  ACC: [id number] EXAM:  XR CHEST PORTABLE ROUTINE 1V                          PROCEDURE DATE:  12/18/2022          INTERPRETATION:  EXAMINATION: XR CHEST    CLINICAL INDICATION: Post-cardiac surgery    TECHNIQUE: Single frontal portable view of the chest from 12/18/2022 2:21   AM    COMPARISON: Chest x-ray 12/17/2022.    FINDINGS:  Adjustment is visualized. Enteric tube seen coursing through diaphragm   and entering stomach, tip is not visualized. Left IJ central venous   catheter with tip at the cavoatrial junction.. Right subclavian central   venous catheter with tip at the right atrium.  Sternotomy wires are intact.  The heart size is normal.  Slight worsening of bilateral airspace opacities.  There is no pneumothorax or pleural effusion.    IMPRESSION:  Lines and tubes as above.  Slight worsening of bilateral airspace opacities, likely related to   pulmonary vascular congestion/edema.    < end of copied text >  < from: CT Abdomen and Pelvis No Cont (12.11.22 @ 08:59) >  ACC: [id number] EXAM:  CT ABDOMEN AND PELVIS                        ACC: [id number] EXAM:  CT CHEST                          PROCEDURE DATE:  12/11/2022          INTERPRETATION:  CLINICAL INFORMATION: Pancreatitis. Cardiogenic shock.    COMPARISON: December 6, 2022.    CONTRAST/COMPLICATIONS:  IV Contrast: NONE  Oral Contrast: NONE  Complications: None reported at time of study completion    PROCEDURE:  CT of the Chest, Abdomen and Pelvis was performed. Respiratory motion   artifact.  Sagittal and coronal reformats were performed.    FINDINGS:  CHEST:  LUNGS AND LARGE AIRWAYS: Endotracheal tube in place. Patent central   airways. There are worsening patchy consolidative opacities throughout   the lungs, most prominent in the right middle lobe. There is moderate   bilateral lower lobe dependent atelectasis. There is mild smooth   thickening of the interlobular septa throughout the lungs with subtle   geographic groundglass opacification.  PLEURA: There are mild bilateral layering pleural effusions. There is no   pneumothorax or pneumomediastinum.  VESSELS: Right internal jugular dual-lumen central venous catheter.   Within normal limits.  HEART: Heart size is normal. No pericardial effusion.  MEDIASTINUM AND GERTRUDE: No lymphadenopathy.  CHEST WALL AND LOWER NECK: Within normal limits.    ABDOMEN AND PELVIS:  LIVER: Within normal limits.  BILE DUCTS: Normal caliber.  GALLBLADDER: Small gallstones in the neck. The gallbladder is partially   contracted.  SPLEEN: Within normal limits.  PANCREAS: Again is noted fullness of the pancreatic tail. There is   slightly improved peripancreatic stranding adjacent to the tail. No   hyperattenuating collection is identified to suggest acute hemorrhage.  ADRENALS: Within normal limits.  KIDNEYS/URETERS: Mild bilateral perinephric stranding. Otherwise, within   normal limits.    BLADDER: De Dios catheter in the collapsed bladder.  REPRODUCTIVE ORGANS: The prostate is not enlarged.    BOWEL: Dobbhoff tube in the distal stomach.. No bowel obstruction.   Appendix is normal.  PERITONEUM: Trace free fluid in the peritoneal cavity.  VESSELS: Left common femoral artery intra-aortic balloon pump catheter   extending to the level of the emeterio. Otherwise, within normal limits.  RETROPERITONEUM/LYMPH NODES: No lymphadenopathy.  ABDOMINAL WALL: Right groin cutaneous drain.  BONES: Within normal limits.    IMPRESSION: Worsening bilateral patchy consolidative opacities. Mild   bilateral layering pleural effusions with bilateral lower lobe dependent   compressive atelectasis. Slightly improved peripancreatic stranding.    < end of copied text >                       Patient is a 62y old  Male who presents with a chief complaint of Acute cholecystitis, gallstone pancreatitis (18 Dec 2022 08:19)    Being followed by ID for Pancreatitis    Interval history:  Intermittent fever  No acute events      ROS:  Lethargic  On Trach, non verbal  No complaints  Coughing        Antimicrobials:        Vital Signs Last 24 Hrs  T(C): 37.3 (12-18-22 @ 12:00), Max: 38.1 (12-17-22 @ 23:00)  T(F): 99.1 (12-18-22 @ 12:00), Max: 100.5 (12-17-22 @ 23:00)  HR: 92 (12-18-22 @ 11:45) (70 - 122)  BP: 94/55 (12-18-22 @ 06:45) (91/52 - 126/61)  BP(mean): 72 (12-18-22 @ 06:45) (67 - 88)  RR: 16 (12-18-22 @ 11:45) (12 - 32)  SpO2: 100% (12-18-22 @ 11:45) (98% - 100%)    Physical Exam:    Constitutional well preserved, NAD, heating blanket    HEENT EOMI, No pallor or icterus, Trach, blood tinged phlegm, suctioned    No oral exudate or erythema    Neck supple no LN    Chest Clear to auscultation, S/P sternotomy, left subclavian- CVVH    CVS S1 S2 WNl No murmur or rub or gallop    Abd soft BS normal No tenderness no masses    Ext No cyanosis clubbing or edema    IV site no erythema tenderness or discharge, LIJ    Joints no swelling or LOM    CNS Lethargic,  non focal    Lab Data:                          8.6    10.43 )-----------( 90       ( 18 Dec 2022 02:21 )             28.2       12-18    134<L>  |  101  |  25<H>  ----------------------------<  106<H>  3.5   |  20<L>  |  1.75<H>    Ca    8.0<L>      18 Dec 2022 02:16  Phos  3.8     12-18  Mg     2.7     12-18    TPro  6.3  /  Alb  2.9<L>  /  TBili  0.5  /  DBili  x   /  AST  31  /  ALT  12  /  AlkPhos  98  12-18        .Sputum Sputum  12-15-22   Normal Respiratory Christy present  --    Few polymorphonuclear leukocytes per low power field  Rare Squamous epithelial cells per low power field  Rare Yeast per oil power field      .Blood Blood-Peripheral  12-15-22   No growth to date.  --  --      .Blood Blood-Peripheral  12-15-22   No growth to date.  --  --    < from: Xray Chest 1 View- PORTABLE-Routine (12.18.22 @ 02:21) >  ACC: [id number] EXAM:  XR CHEST PORTABLE ROUTINE 1V                          PROCEDURE DATE:  12/18/2022          INTERPRETATION:  EXAMINATION: XR CHEST    CLINICAL INDICATION: Post-cardiac surgery    TECHNIQUE: Single frontal portable view of the chest from 12/18/2022 2:21   AM    COMPARISON: Chest x-ray 12/17/2022.    FINDINGS:  Adjustment is visualized. Enteric tube seen coursing through diaphragm   and entering stomach, tip is not visualized. Left IJ central venous   catheter with tip at the cavoatrial junction.. Right subclavian central   venous catheter with tip at the right atrium.  Sternotomy wires are intact.  The heart size is normal.  Slight worsening of bilateral airspace opacities.  There is no pneumothorax or pleural effusion.    IMPRESSION:  Lines and tubes as above.  Slight worsening of bilateral airspace opacities, likely related to   pulmonary vascular congestion/edema.    < end of copied text >  < from: CT Abdomen and Pelvis No Cont (12.11.22 @ 08:59) >  ACC: [id number] EXAM:  CT ABDOMEN AND PELVIS                        ACC: [id number] EXAM:  CT CHEST                          PROCEDURE DATE:  12/11/2022          INTERPRETATION:  CLINICAL INFORMATION: Pancreatitis. Cardiogenic shock.    COMPARISON: December 6, 2022.    CONTRAST/COMPLICATIONS:  IV Contrast: NONE  Oral Contrast: NONE  Complications: None reported at time of study completion    PROCEDURE:  CT of the Chest, Abdomen and Pelvis was performed. Respiratory motion   artifact.  Sagittal and coronal reformats were performed.    FINDINGS:  CHEST:  LUNGS AND LARGE AIRWAYS: Endotracheal tube in place. Patent central   airways. There are worsening patchy consolidative opacities throughout   the lungs, most prominent in the right middle lobe. There is moderate   bilateral lower lobe dependent atelectasis. There is mild smooth   thickening of the interlobular septa throughout the lungs with subtle   geographic groundglass opacification.  PLEURA: There are mild bilateral layering pleural effusions. There is no   pneumothorax or pneumomediastinum.  VESSELS: Right internal jugular dual-lumen central venous catheter.   Within normal limits.  HEART: Heart size is normal. No pericardial effusion.  MEDIASTINUM AND GERTRUDE: No lymphadenopathy.  CHEST WALL AND LOWER NECK: Within normal limits.    ABDOMEN AND PELVIS:  LIVER: Within normal limits.  BILE DUCTS: Normal caliber.  GALLBLADDER: Small gallstones in the neck. The gallbladder is partially   contracted.  SPLEEN: Within normal limits.  PANCREAS: Again is noted fullness of the pancreatic tail. There is   slightly improved peripancreatic stranding adjacent to the tail. No   hyperattenuating collection is identified to suggest acute hemorrhage.  ADRENALS: Within normal limits.  KIDNEYS/URETERS: Mild bilateral perinephric stranding. Otherwise, within   normal limits.    BLADDER: De Dios catheter in the collapsed bladder.  REPRODUCTIVE ORGANS: The prostate is not enlarged.    BOWEL: Dobbhoff tube in the distal stomach.. No bowel obstruction.   Appendix is normal.  PERITONEUM: Trace free fluid in the peritoneal cavity.  VESSELS: Left common femoral artery intra-aortic balloon pump catheter   extending to the level of the emeterio. Otherwise, within normal limits.  RETROPERITONEUM/LYMPH NODES: No lymphadenopathy.  ABDOMINAL WALL: Right groin cutaneous drain.  BONES: Within normal limits.    IMPRESSION: Worsening bilateral patchy consolidative opacities. Mild   bilateral layering pleural effusions with bilateral lower lobe dependent   compressive atelectasis. Slightly improved peripancreatic stranding.    < end of copied text >                       Patient is a 62y old  Male who presents with a chief complaint of Acute cholecystitis, gallstone pancreatitis (18 Dec 2022 08:19)    Being followed by ID for Pancreatitis    Interval history:  Intermittent fever  No acute events      ROS:  Lethargic  On Trach, non verbal  No complaints  Coughing        Antimicrobials:        Vital Signs Last 24 Hrs  T(C): 37.3 (12-18-22 @ 12:00), Max: 38.1 (12-17-22 @ 23:00)  T(F): 99.1 (12-18-22 @ 12:00), Max: 100.5 (12-17-22 @ 23:00)  HR: 92 (12-18-22 @ 11:45) (70 - 122)  BP: 94/55 (12-18-22 @ 06:45) (91/52 - 126/61)  BP(mean): 72 (12-18-22 @ 06:45) (67 - 88)  RR: 16 (12-18-22 @ 11:45) (12 - 32)  SpO2: 100% (12-18-22 @ 11:45) (98% - 100%)    Physical Exam:    Constitutional well preserved, NAD, heating blanket    HEENT EOMI, No pallor or icterus, Trach, blood tinged phlegm, suctioned    No oral exudate or erythema    Neck supple no LN    Chest Clear to auscultation, S/P sternotomy, left subclavian- CVVH    CVS S1 S2 WNl No murmur or rub or gallop    Abd soft BS normal No tenderness no masses    Ext No cyanosis clubbing or edema    IV site no erythema tenderness or discharge, LIJ    Joints no swelling or LOM    CNS Lethargic,  non focal    Lab Data:                          8.6    10.43 )-----------( 90       ( 18 Dec 2022 02:21 )             28.2       12-18    134<L>  |  101  |  25<H>  ----------------------------<  106<H>  3.5   |  20<L>  |  1.75<H>    Ca    8.0<L>      18 Dec 2022 02:16  Phos  3.8     12-18  Mg     2.7     12-18    TPro  6.3  /  Alb  2.9<L>  /  TBili  0.5  /  DBili  x   /  AST  31  /  ALT  12  /  AlkPhos  98  12-18        .Sputum Sputum  12-15-22   Normal Respiratory Christy present  --    Few polymorphonuclear leukocytes per low power field  Rare Squamous epithelial cells per low power field  Rare Yeast per oil power field      .Blood Blood-Peripheral  12-15-22   No growth to date.  --  --      .Blood Blood-Peripheral  12-15-22   No growth to date.  --  --    < from: Xray Chest 1 View- PORTABLE-Routine (12.18.22 @ 02:21) >  ACC: [id number] EXAM:  XR CHEST PORTABLE ROUTINE 1V                          PROCEDURE DATE:  12/18/2022          INTERPRETATION:  EXAMINATION: XR CHEST    CLINICAL INDICATION: Post-cardiac surgery    TECHNIQUE: Single frontal portable view of the chest from 12/18/2022 2:21   AM    COMPARISON: Chest x-ray 12/17/2022.    FINDINGS:  Adjustment is visualized. Enteric tube seen coursing through diaphragm   and entering stomach, tip is not visualized. Left IJ central venous   catheter with tip at the cavoatrial junction.. Right subclavian central   venous catheter with tip at the right atrium.  Sternotomy wires are intact.  The heart size is normal.  Slight worsening of bilateral airspace opacities.  There is no pneumothorax or pleural effusion.    IMPRESSION:  Lines and tubes as above.  Slight worsening of bilateral airspace opacities, likely related to   pulmonary vascular congestion/edema.    < end of copied text >  < from: CT Abdomen and Pelvis No Cont (12.11.22 @ 08:59) >  ACC: [id number] EXAM:  CT ABDOMEN AND PELVIS                        ACC: [id number] EXAM:  CT CHEST                          PROCEDURE DATE:  12/11/2022          INTERPRETATION:  CLINICAL INFORMATION: Pancreatitis. Cardiogenic shock.    COMPARISON: December 6, 2022.    CONTRAST/COMPLICATIONS:  IV Contrast: NONE  Oral Contrast: NONE  Complications: None reported at time of study completion    PROCEDURE:  CT of the Chest, Abdomen and Pelvis was performed. Respiratory motion   artifact.  Sagittal and coronal reformats were performed.    FINDINGS:  CHEST:  LUNGS AND LARGE AIRWAYS: Endotracheal tube in place. Patent central   airways. There are worsening patchy consolidative opacities throughout   the lungs, most prominent in the right middle lobe. There is moderate   bilateral lower lobe dependent atelectasis. There is mild smooth   thickening of the interlobular septa throughout the lungs with subtle   geographic groundglass opacification.  PLEURA: There are mild bilateral layering pleural effusions. There is no   pneumothorax or pneumomediastinum.  VESSELS: Right internal jugular dual-lumen central venous catheter.   Within normal limits.  HEART: Heart size is normal. No pericardial effusion.  MEDIASTINUM AND GERTRUDE: No lymphadenopathy.  CHEST WALL AND LOWER NECK: Within normal limits.    ABDOMEN AND PELVIS:  LIVER: Within normal limits.  BILE DUCTS: Normal caliber.  GALLBLADDER: Small gallstones in the neck. The gallbladder is partially   contracted.  SPLEEN: Within normal limits.  PANCREAS: Again is noted fullness of the pancreatic tail. There is   slightly improved peripancreatic stranding adjacent to the tail. No   hyperattenuating collection is identified to suggest acute hemorrhage.  ADRENALS: Within normal limits.  KIDNEYS/URETERS: Mild bilateral perinephric stranding. Otherwise, within   normal limits.    BLADDER: De Dios catheter in the collapsed bladder.  REPRODUCTIVE ORGANS: The prostate is not enlarged.    BOWEL: Dobbhoff tube in the distal stomach.. No bowel obstruction.   Appendix is normal.  PERITONEUM: Trace free fluid in the peritoneal cavity.  VESSELS: Left common femoral artery intra-aortic balloon pump catheter   extending to the level of the emeterio. Otherwise, within normal limits.  RETROPERITONEUM/LYMPH NODES: No lymphadenopathy.  ABDOMINAL WALL: Right groin cutaneous drain.  BONES: Within normal limits.    IMPRESSION: Worsening bilateral patchy consolidative opacities. Mild   bilateral layering pleural effusions with bilateral lower lobe dependent   compressive atelectasis. Slightly improved peripancreatic stranding.    < end of copied text >

## 2022-12-18 NOTE — PROGRESS NOTE ADULT - ASSESSMENT
61 YO M now S/P #7 Proximal XLT Tracheostomy POD # 2. In inferior and superior surgicel around stoma removed. Doing well on the ventilator. No bleeding noted.  63 YO M now S/P #7 Proximal XLT Tracheostomy POD # 2. In inferior and superior surgicel around stoma removed. Doing well on the ventilator. No bleeding noted.

## 2022-12-18 NOTE — PROGRESS NOTE ADULT - SUBJECTIVE AND OBJECTIVE BOX
CRITICAL CARE ATTENDING - CTICU    MEDICATIONS  (STANDING):  albuterol/ipratropium for Nebulization 3 milliLiter(s) Nebulizer every 6 hours  aMIOdarone    Tablet 200 milliGRAM(s) Oral daily  aMIOdarone IVPB 150 milliGRAM(s) IV Intermittent once  aspirin  chewable 81 milliGRAM(s) Oral daily  atorvastatin 40 milliGRAM(s) Oral at bedtime  chlorhexidine 0.12% Liquid 15 milliLiter(s) Oral Mucosa every 12 hours  chlorhexidine 4% Liquid 1 Application(s) Topical <User Schedule>  CRRT Treatment    <Continuous>  heparin   Injectable 5000 Unit(s) SubCutaneous every 8 hours  heparin  Infusion Syringe 300 Unit(s)/Hr (0.6 mL/Hr) CRRT <Continuous>  insulin regular Infusion 3 Unit(s)/Hr (3 mL/Hr) IV Continuous <Continuous>  milrinone Infusion 0.1 MICROgram(s)/kG/Min (2.79 mL/Hr) IV Continuous <Continuous>  multivitamin/minerals/iron Oral Solution (CENTRUM) 15 milliLiter(s) Oral daily  pantoprazole  Injectable 40 milliGRAM(s) IV Push daily  Phoxillum Filtration BK 4 / 2.5 5000 milliLiter(s) (2400 mL/Hr) CRRT <Continuous>  Phoxillum Filtration BK 4 / 2.5 5000 milliLiter(s) (800 mL/Hr) CRRT <Continuous>  PrismaSOL Filtration BGK 4 / 2.5 5000 milliLiter(s) (200 mL/Hr) CRRT <Continuous>  thiamine 100 milliGRAM(s) Enteral Tube daily  vasopressin Infusion 0.02 Unit(s)/Min (3 mL/Hr) IV Continuous <Continuous>                                    8.6    10.43 )-----------( 90       ( 18 Dec 2022 02:21 )             28.2       12-18    134<L>  |  101  |  25<H>  ----------------------------<  106<H>  3.5   |  20<L>  |  1.75<H>    Ca    8.0<L>      18 Dec 2022 02:16  Phos  3.8     12-18  Mg     2.7     12-18    TPro  6.3  /  Alb  2.9<L>  /  TBili  0.5  /  DBili  x   /  AST  31  /  ALT  12  /  AlkPhos  98        PT/INR - ( 17 Dec 2022 00:12 )   PT: 11.6 sec;   INR: 1.01 ratio         PTT - ( 18 Dec 2022 02:21 )  PTT:27.2 sec    Mode: AC/ CMV (Assist Control/ Continuous Mandatory Ventilation)  RR (machine): 12  TV (machine): 600  FiO2: 50  PEEP: 5  ITime: 1  MAP: 10  PIP: 21      Daily     Daily Weight in k.7 (18 Dec 2022 00:00)      16 @ 07:01  -   @ 07:00  --------------------------------------------------------  IN: 1756.3 mL / OUT: 1468 mL / NET: 288.3 mL     @ 07:01  -   @ 06:48  --------------------------------------------------------  IN: 2925.7 mL / OUT: 2386 mL / NET: 539.7 mL      Critically Ill patient  : [ ] preoperative ,   [ ] post operative  [x] Non Operative    Requires :  [x ] Arterial Line   [ x] Central Line  [ ] PA catheter  [x] IABP  [ ] ECMO  [ ] LVAD  [x ] Ventilator  [ ] pacemaker [ ] Impella.                      [x ] ABG's     [x ] Pulse Oxymetry Monitoring  Bedside evaluation , monitoring , treatment of hemodynamics , fluids , IVP/ IVCD meds.      Diagnosis:      - [facility] - arrest in [facility]     ECMO Decannulation      POD 12/- IABP placed in cath lab     POD 2 Mitral Clip x1, TRACH 7 Merari XLT prox with ENT    MI     Acute Pancreatitis     ARDS     Hemodynamic lability,  instability. Requires IVCD [x ] vasopressors [x] inotropes  [ ] vasodilator  [x]IVSS fluid  to maintain MAP, perfusion, C.I.     Post Arrest Shock state    Respiratory Failure     Requires chest PT, pulmonary toilet, ambu bagging, suctioning to maintain SaO2,  patent airway and treat atelectasis.     Ventilator Management:  [x ]AC-rest    [ x]CPAP-PS Wean    [ ]Trach Collar     [ ]Extubate    [ ] T-Piece  [ ]peep>5     Difficult weaning process - multiple organ system involvement in critically ill patient     Sedated with IVCD Propofol for vent synchrony    CHF- acute [ x]   chronic [  ]    systolic [ x]   diatolic [ ]          - Echo- EF -   30-40% Severe segmental LV systolic dysfunction          [ ] RV dysfunction     - Cxr-cardiomegally, edema          - Clinical-  [x ]inotropes   [x]pressors  [ ]diuresis   [x]IABP      [ ]LVAD   [x]Respiratory Failure.     Cardiogenic Shock - resolving    CVVHD     Thrombocytopenia     Hypotension     IABP removed yesterday morning    Present, assisting, supervising: [ x] IABP removal    DM- IVCD Insulin    Requires bedside physical therapy, mobilization and total [facility] care.     Tolerates NG / NJ feeds at [x] goal rate  [ ] trophic rate    [ ]       rate         By signing my name below, I, Maria D'Amico, attest that this documentation has been prepared under the direction and in the presence of Finn Zelaya MD.   Electronically Signed: Maria D'Amico, Scribe 22 @ 06:48      Discussed with CT surgeon, Physician Assistant - Nurse Practitioner- Critical care medicine team.   Discussed at  AM / PM rounds.   Chart, labs , films reviewed.    Cumulative Critical Care Time Given Today:  CRITICAL CARE ATTENDING - CTICU    MEDICATIONS  (STANDING):  albuterol/ipratropium for Nebulization 3 milliLiter(s) Nebulizer every 6 hours  aMIOdarone    Tablet 200 milliGRAM(s) Oral daily  aMIOdarone IVPB 150 milliGRAM(s) IV Intermittent once  aspirin  chewable 81 milliGRAM(s) Oral daily  atorvastatin 40 milliGRAM(s) Oral at bedtime  chlorhexidine 0.12% Liquid 15 milliLiter(s) Oral Mucosa every 12 hours  chlorhexidine 4% Liquid 1 Application(s) Topical <User Schedule>  CRRT Treatment    <Continuous>  heparin   Injectable 5000 Unit(s) SubCutaneous every 8 hours  heparin  Infusion Syringe 300 Unit(s)/Hr (0.6 mL/Hr) CRRT <Continuous>  insulin regular Infusion 3 Unit(s)/Hr (3 mL/Hr) IV Continuous <Continuous>  milrinone Infusion 0.1 MICROgram(s)/kG/Min (2.79 mL/Hr) IV Continuous <Continuous>  multivitamin/minerals/iron Oral Solution (CENTRUM) 15 milliLiter(s) Oral daily  pantoprazole  Injectable 40 milliGRAM(s) IV Push daily  Phoxillum Filtration BK 4 / 2.5 5000 milliLiter(s) (2400 mL/Hr) CRRT <Continuous>  Phoxillum Filtration BK 4 / 2.5 5000 milliLiter(s) (800 mL/Hr) CRRT <Continuous>  PrismaSOL Filtration BGK 4 / 2.5 5000 milliLiter(s) (200 mL/Hr) CRRT <Continuous>  thiamine 100 milliGRAM(s) Enteral Tube daily  vasopressin Infusion 0.02 Unit(s)/Min (3 mL/Hr) IV Continuous <Continuous>                                    8.6    10.43 )-----------( 90       ( 18 Dec 2022 02:21 )             28.2       12-18    134<L>  |  101  |  25<H>  ----------------------------<  106<H>  3.5   |  20<L>  |  1.75<H>    Ca    8.0<L>      18 Dec 2022 02:16  Phos  3.8     12-18  Mg     2.7     12-18    TPro  6.3  /  Alb  2.9<L>  /  TBili  0.5  /  DBili  x   /  AST  31  /  ALT  12  /  AlkPhos  98        PT/INR - ( 17 Dec 2022 00:12 )   PT: 11.6 sec;   INR: 1.01 ratio         PTT - ( 18 Dec 2022 02:21 )  PTT:27.2 sec    Mode: AC/ CMV (Assist Control/ Continuous Mandatory Ventilation)  RR (machine): 12  TV (machine): 600  FiO2: 50  PEEP: 5  ITime: 1  MAP: 10  PIP: 21      Daily     Daily Weight in k.7 (18 Dec 2022 00:00)      16 @ 07:01  -   @ 07:00  --------------------------------------------------------  IN: 1756.3 mL / OUT: 1468 mL / NET: 288.3 mL     @ 07:01  -   @ 06:48  --------------------------------------------------------  IN: 2925.7 mL / OUT: 2386 mL / NET: 539.7 mL      Critically Ill patient  : [ ] preoperative ,   [ ] post operative  [x] Non Operative    Requires :  [x ] Arterial Line   [ x] Central Line  [ ] PA catheter  [x] IABP  [ ] ECMO  [ ] LVAD  [x ] Ventilator  [ ] pacemaker [ ] Impella.                      [x ] ABG's     [x ] Pulse Oxymetry Monitoring  Bedside evaluation , monitoring , treatment of hemodynamics , fluids , IVP/ IVCD meds.      Diagnosis:      - [facility] - arrest in [facility]     ECMO Decannulation      POD 12/- IABP placed in cath lab     POD 2 Mitral Clip x1, TRACH 7 Merari XLT prox with ENT    MI     Acute Pancreatitis     ARDS     Hemodynamic lability,  instability. Requires IVCD [x ] vasopressors [x] inotropes  [ ] vasodilator  [x]IVSS fluid  to maintain MAP, perfusion, C.I.     Post Arrest Shock state    Respiratory Failure     Requires chest PT, pulmonary toilet, ambu bagging, suctioning to maintain SaO2,  patent airway and treat atelectasis.     Ventilator Management:  [x ]AC-rest    [ x]CPAP-PS Wean    [ ]Trach Collar     [ ]Extubate    [ ] T-Piece  [ ]peep>5     Difficult weaning process - multiple organ system involvement in critically ill patient     Sedated with IVCD Propofol for vent synchrony    CHF- acute [ x]   chronic [  ]    systolic [ x]   diatolic [ ]          - Echo- EF -   30-40% Severe segmental LV systolic dysfunction          [ ] RV dysfunction     - Cxr-cardiomegally, edema          - Clinical-  [x ]inotropes   [x]pressors  [ ]diuresis   [x]IABP      [ ]LVAD   [x]Respiratory Failure.     Cardiogenic Shock - resolving    CVVHD     Thrombocytopenia     Hypotension     IABP removed yesterday morning    Present, assisting, supervising: [ x] IABP removal    DM- IVCD Insulin    Requires bedside physical therapy, mobilization and total [facility] care.     Tolerates NG / NJ feeds at [x] goal rate  [ ] trophic rate    [ ]       rate         By signing my name below, I, Maria D'Amico, attest that this documentation has been prepared under the direction and in the presence of Finn Zelaya MD.   Electronically Signed: Maria D'Amico, Scribe 22 @ 06:48      Discussed with CT surgeon, Physician Assistant - Nurse Practitioner- Critical care medicine team.   Discussed at  AM / PM rounds.   Chart, labs , films reviewed.    Cumulative Critical Care Time Given Today:  CRITICAL CARE ATTENDING - CTICU    MEDICATIONS  (STANDING):  albuterol/ipratropium for Nebulization 3 milliLiter(s) Nebulizer every 6 hours  aMIOdarone    Tablet 200 milliGRAM(s) Oral daily  aMIOdarone IVPB 150 milliGRAM(s) IV Intermittent once  aspirin  chewable 81 milliGRAM(s) Oral daily  atorvastatin 40 milliGRAM(s) Oral at bedtime  chlorhexidine 0.12% Liquid 15 milliLiter(s) Oral Mucosa every 12 hours  chlorhexidine 4% Liquid 1 Application(s) Topical <User Schedule>  CRRT Treatment    <Continuous>  heparin   Injectable 5000 Unit(s) SubCutaneous every 8 hours  heparin  Infusion Syringe 300 Unit(s)/Hr (0.6 mL/Hr) CRRT <Continuous>  insulin regular Infusion 3 Unit(s)/Hr (3 mL/Hr) IV Continuous <Continuous>  milrinone Infusion 0.1 MICROgram(s)/kG/Min (2.79 mL/Hr) IV Continuous <Continuous>  multivitamin/minerals/iron Oral Solution (CENTRUM) 15 milliLiter(s) Oral daily  pantoprazole  Injectable 40 milliGRAM(s) IV Push daily  Phoxillum Filtration BK 4 / 2.5 5000 milliLiter(s) (2400 mL/Hr) CRRT <Continuous>  Phoxillum Filtration BK 4 / 2.5 5000 milliLiter(s) (800 mL/Hr) CRRT <Continuous>  PrismaSOL Filtration BGK 4 / 2.5 5000 milliLiter(s) (200 mL/Hr) CRRT <Continuous>  thiamine 100 milliGRAM(s) Enteral Tube daily  vasopressin Infusion 0.02 Unit(s)/Min (3 mL/Hr) IV Continuous <Continuous>                                    8.6    10.43 )-----------( 90       ( 18 Dec 2022 02:21 )             28.2       12-18    134<L>  |  101  |  25<H>  ----------------------------<  106<H>  3.5   |  20<L>  |  1.75<H>    Ca    8.0<L>      18 Dec 2022 02:16  Phos  3.8     12-18  Mg     2.7     12-18    TPro  6.3  /  Alb  2.9<L>  /  TBili  0.5  /  DBili  x   /  AST  31  /  ALT  12  /  AlkPhos  98        PT/INR - ( 17 Dec 2022 00:12 )   PT: 11.6 sec;   INR: 1.01 ratio         PTT - ( 18 Dec 2022 02:21 )  PTT:27.2 sec    Mode: AC/ CMV (Assist Control/ Continuous Mandatory Ventilation)  RR (machine): 12  TV (machine): 600  FiO2: 50  PEEP: 5  ITime: 1  MAP: 10  PIP: 21      Daily     Daily Weight in k.7 (18 Dec 2022 00:00)      16 @ 07:01  -   @ 07:00  --------------------------------------------------------  IN: 1756.3 mL / OUT: 1468 mL / NET: 288.3 mL     @ 07:01  -   @ 06:48  --------------------------------------------------------  IN: 2925.7 mL / OUT: 2386 mL / NET: 539.7 mL      Critically Ill patient  : [ ] preoperative ,   [ ] post operative  [x] Non Operative    Requires :  [x ] Arterial Line   [ x] Central Line  [ ] PA catheter  [x] IABP  [ ] ECMO  [ ] LVAD  [x ] Ventilator  [ ] pacemaker [ ] Impella.                      [x ] ABG's     [x ] Pulse Oxymetry Monitoring  Bedside evaluation , monitoring , treatment of hemodynamics , fluids , IVP/ IVCD meds.      Diagnosis:      - [facility] - arrest in [facility]ICU     ECMO Decannulation      POD 12/- IABP placed in cath lab     POD 2 Mitral Clip x1, TRACH 7 Merari XLT prox with ENT    MI     Acute Pancreatitis     ARDS     Hemodynamic lability,  instability. Requires IVCD [x ] vasopressors [x] inotropes  [ ] vasodilator  [x]IVSS fluid  to maintain MAP, perfusion, C.I.     Post Arrest Shock state    Respiratory Failure     Requires chest PT, pulmonary toilet, ambu bagging, suctioning to maintain SaO2,  patent airway and treat atelectasis.     Ventilator Management:  [x ]AC-rest    [ x]CPAP-PS Wean    [ ]Trach Collar     [ ]Extubate    [ ] T-Piece  [ ]peep>5     Difficult weaning process - multiple organ system involvement in critically ill patient     Sedated with IVCD Propofol for vent synchrony    CHF- acute [ x]   chronic [  ]    systolic [ x]   diatolic [ ]          - Echo- EF -   30-40% Severe segmental LV systolic dysfunction          [ ] RV dysfunction     - Cxr-cardiomegally, edema          - Clinical-  [x ]inotropes   [x]pressors  [ ]diuresis   [x]IABP      [ ]LVAD   [x]Respiratory Failure.     Cardiogenic Shock - resolving    CVVHD     Thrombocytopenia     Hypotension     IABP removed yesterday morning    Present, assisting, supervising: [ x] IABP removal    DM- IVCD Insulin    Requires bedside physical therapy, mobilization and total retirement care.     Tolerates NG / NJ feeds at [x] goal rate  [ ] trophic rate    [ ]       rate         By signing my name below, I, Maria D'Amico, attest that this documentation has been prepared under the direction and in the presence of Finn Zelaya MD.   Electronically Signed: Maria D'Amico, Scribe 22 @ 06:48      Discussed with CT surgeon, Physician Assistant - Nurse Practitioner- Critical care medicine team.   Discussed at  AM / PM rounds.   Chart, labs , films reviewed.    Cumulative Critical Care Time Given Today:  CRITICAL CARE ATTENDING - CTICU    MEDICATIONS  (STANDING):  albuterol/ipratropium for Nebulization 3 milliLiter(s) Nebulizer every 6 hours  aMIOdarone    Tablet 200 milliGRAM(s) Oral daily  aMIOdarone IVPB 150 milliGRAM(s) IV Intermittent once  aspirin  chewable 81 milliGRAM(s) Oral daily  atorvastatin 40 milliGRAM(s) Oral at bedtime  chlorhexidine 0.12% Liquid 15 milliLiter(s) Oral Mucosa every 12 hours  chlorhexidine 4% Liquid 1 Application(s) Topical <User Schedule>  CRRT Treatment    <Continuous>  heparin   Injectable 5000 Unit(s) SubCutaneous every 8 hours  heparin  Infusion Syringe 300 Unit(s)/Hr (0.6 mL/Hr) CRRT <Continuous>  insulin regular Infusion 3 Unit(s)/Hr (3 mL/Hr) IV Continuous <Continuous>  milrinone Infusion 0.1 MICROgram(s)/kG/Min (2.79 mL/Hr) IV Continuous <Continuous>  multivitamin/minerals/iron Oral Solution (CENTRUM) 15 milliLiter(s) Oral daily  pantoprazole  Injectable 40 milliGRAM(s) IV Push daily  Phoxillum Filtration BK 4 / 2.5 5000 milliLiter(s) (2400 mL/Hr) CRRT <Continuous>  Phoxillum Filtration BK 4 / 2.5 5000 milliLiter(s) (800 mL/Hr) CRRT <Continuous>  PrismaSOL Filtration BGK 4 / 2.5 5000 milliLiter(s) (200 mL/Hr) CRRT <Continuous>  thiamine 100 milliGRAM(s) Enteral Tube daily  vasopressin Infusion 0.02 Unit(s)/Min (3 mL/Hr) IV Continuous <Continuous>                                    8.6    10.43 )-----------( 90       ( 18 Dec 2022 02:21 )             28.2       12-18    134<L>  |  101  |  25<H>  ----------------------------<  106<H>  3.5   |  20<L>  |  1.75<H>    Ca    8.0<L>      18 Dec 2022 02:16  Phos  3.8     12-18  Mg     2.7     12-18    TPro  6.3  /  Alb  2.9<L>  /  TBili  0.5  /  DBili  x   /  AST  31  /  ALT  12  /  AlkPhos  98        PT/INR - ( 17 Dec 2022 00:12 )   PT: 11.6 sec;   INR: 1.01 ratio         PTT - ( 18 Dec 2022 02:21 )  PTT:27.2 sec    Mode: AC/ CMV (Assist Control/ Continuous Mandatory Ventilation)  RR (machine): 12  TV (machine): 600  FiO2: 50  PEEP: 5  ITime: 1  MAP: 10  PIP: 21      Daily     Daily Weight in k.7 (18 Dec 2022 00:00)       @ 07:01  -   @ 07:00  --------------------------------------------------------  IN: 1756.3 mL / OUT: 1468 mL / NET: 288.3 mL     @ 07:01  -   @ 06:48  --------------------------------------------------------  IN: 2925.7 mL / OUT: 2386 mL / NET: 539.7 mL      Critically Ill patient  : [ ] preoperative ,   [ ] post operative  [x] Non Operative    Requires :  [x ] Arterial Line   [ x] Central Line  [ ] PA catheter  [x] IABP removed yesterday  [ ] ECMO  [ ] LVAD  [x ] Ventilator  [ ] pacemaker [ ] Impella.  [x] CVVHD                      [x ] ABG's     [x ] Pulse Oxymetry Monitoring  Bedside evaluation , monitoring , treatment of hemodynamics , fluids , IVP/ IVCD meds.      Diagnosis:      - [facility] - arrest in [facility]ICU     ECMO Decannulation      POD 12/- IABP placed in cath lab - removed    POD 2 Mitral Clip x1, TRACH 7 Merari XLT prox with ENT    MI     Acute Pancreatitis     ARDS     Hemodynamic lability,  instability. Requires IVCD [x ] vasopressors [x] inotropes  [ ] vasodilator  [x]IVSS fluid  to maintain MAP, perfusion, C.I.     Post Arrest Shock state    Respiratory Failure - Tracheostomy     Requires chest PT, pulmonary toilet, ambu bagging, suctioning to maintain SaO2,  patent airway and treat atelectasis.     Ventilator Management:  [x ]AC-rest    [ x]CPAP-PS Wean    [ ]Trach Collar     [ ]Extubate    [ ] T-Piece  [ ]peep>5     Difficult weaning process - multiple organ system involvement in critically ill patient     CHF- acute [ x]   chronic [  ]    systolic [ x]   diatolic [ ]          - Echo- EF -   30-40% Severe segmental LV systolic dysfunction          [ ] RV dysfunction     - Cxr-cardiomegally, edema          - Clinical-  [x ]inotropes   [x]pressors  [ ]diuresis   [ ]IABP      [ ]LVAD   [x]Respiratory Failure.     Cardiogenic Shock - resolving    CVVHD  -50cc/hr    Thrombocytopenia     Hypotension     IABP removed yesterday morning    DM- IVCD Insulin    Requires bedside physical therapy, mobilization and total half-way care.     Tolerates NG / NJ feeds at [x] goal rate  [ ] trophic rate    [ ]       rate         By signing my name below, I, Maria D'Amico, attest that this documentation has been prepared under the direction and in the presence of Finn Zelaya MD.   Electronically Signed: Maria D'Amico, Scribe 22 @ 06:48    I, Finn Zelaya, personally performed the services described in this documentation. All medical record entries made by the scribe were at my direction and in my presence. I have reviewed the chart and agree that the record reflects my personal performance and is accurate and complete.   Finn Zelaya MD.       Discussed with CT surgeon, Physician Assistant - Nurse Practitioner- Critical care medicine team.   Discussed at  AM / PM rounds.   Chart, labs , films reviewed.    Cumulative Critical Care Time Given Today:  45 min CRITICAL CARE ATTENDING - CTICU    MEDICATIONS  (STANDING):  albuterol/ipratropium for Nebulization 3 milliLiter(s) Nebulizer every 6 hours  aMIOdarone    Tablet 200 milliGRAM(s) Oral daily  aMIOdarone IVPB 150 milliGRAM(s) IV Intermittent once  aspirin  chewable 81 milliGRAM(s) Oral daily  atorvastatin 40 milliGRAM(s) Oral at bedtime  chlorhexidine 0.12% Liquid 15 milliLiter(s) Oral Mucosa every 12 hours  chlorhexidine 4% Liquid 1 Application(s) Topical <User Schedule>  CRRT Treatment    <Continuous>  heparin   Injectable 5000 Unit(s) SubCutaneous every 8 hours  heparin  Infusion Syringe 300 Unit(s)/Hr (0.6 mL/Hr) CRRT <Continuous>  insulin regular Infusion 3 Unit(s)/Hr (3 mL/Hr) IV Continuous <Continuous>  milrinone Infusion 0.1 MICROgram(s)/kG/Min (2.79 mL/Hr) IV Continuous <Continuous>  multivitamin/minerals/iron Oral Solution (CENTRUM) 15 milliLiter(s) Oral daily  pantoprazole  Injectable 40 milliGRAM(s) IV Push daily  Phoxillum Filtration BK 4 / 2.5 5000 milliLiter(s) (2400 mL/Hr) CRRT <Continuous>  Phoxillum Filtration BK 4 / 2.5 5000 milliLiter(s) (800 mL/Hr) CRRT <Continuous>  PrismaSOL Filtration BGK 4 / 2.5 5000 milliLiter(s) (200 mL/Hr) CRRT <Continuous>  thiamine 100 milliGRAM(s) Enteral Tube daily  vasopressin Infusion 0.02 Unit(s)/Min (3 mL/Hr) IV Continuous <Continuous>                                    8.6    10.43 )-----------( 90       ( 18 Dec 2022 02:21 )             28.2       12-18    134<L>  |  101  |  25<H>  ----------------------------<  106<H>  3.5   |  20<L>  |  1.75<H>    Ca    8.0<L>      18 Dec 2022 02:16  Phos  3.8     12-18  Mg     2.7     12-18    TPro  6.3  /  Alb  2.9<L>  /  TBili  0.5  /  DBili  x   /  AST  31  /  ALT  12  /  AlkPhos  98        PT/INR - ( 17 Dec 2022 00:12 )   PT: 11.6 sec;   INR: 1.01 ratio         PTT - ( 18 Dec 2022 02:21 )  PTT:27.2 sec    Mode: AC/ CMV (Assist Control/ Continuous Mandatory Ventilation)  RR (machine): 12  TV (machine): 600  FiO2: 50  PEEP: 5  ITime: 1  MAP: 10  PIP: 21      Daily     Daily Weight in k.7 (18 Dec 2022 00:00)       @ 07:01  -   @ 07:00  --------------------------------------------------------  IN: 1756.3 mL / OUT: 1468 mL / NET: 288.3 mL     @ 07:01  -   @ 06:48  --------------------------------------------------------  IN: 2925.7 mL / OUT: 2386 mL / NET: 539.7 mL      Critically Ill patient  : [ ] preoperative ,   [ ] post operative  [x] Non Operative    Requires :  [x ] Arterial Line   [ x] Central Line  [ ] PA catheter  [x] IABP removed yesterday  [ ] ECMO  [ ] LVAD  [x ] Ventilator  [ ] pacemaker [ ] Impella.  [x] CVVHD                      [x ] ABG's     [x ] Pulse Oxymetry Monitoring  Bedside evaluation , monitoring , treatment of hemodynamics , fluids , IVP/ IVCD meds.      Diagnosis:      - [facility] ECMO - arrest in SICU     ECMO Decannulation      POD 12/- IABP placed in cath lab - removed    POD 2 Mitral Clip x1, TRACH 7 Merari XLT prox with ENT    MI     Acute Pancreatitis     ARDS     Hemodynamic lability,  instability. Requires IVCD [x ] vasopressors [x] inotropes  [ ] vasodilator  [x]IVSS fluid  to maintain MAP, perfusion, C.I.     Post Arrest Shock state    Respiratory Failure - Tracheostomy     Requires chest PT, pulmonary toilet, ambu bagging, suctioning to maintain SaO2,  patent airway and treat atelectasis.     Ventilator Management:  [x ]AC-rest    [ x]CPAP-PS Wean    [ ]Trach Collar     [ ]Extubate    [ ] T-Piece  [ ]peep>5     Difficult weaning process - multiple organ system involvement in critically ill patient     CHF- acute [ x]   chronic [  ]    systolic [ x]   diatolic [ ]          - Echo- EF -   30-40% Severe segmental LV systolic dysfunction          [ ] RV dysfunction     - Cxr-cardiomegally, edema          - Clinical-  [x ]inotropes   [x]pressors  [ ]diuresis   [ ]IABP      [ ]LVAD   [x]Respiratory Failure.     Cardiogenic Shock - resolving    CVVHD  -50cc/hr    Thrombocytopenia     Hypotension     IABP removed yesterday morning    DM- IVCD Insulin    Requires bedside physical therapy, mobilization and total intermediate care.     Tolerates NG / NJ feeds at [x] goal rate  [ ] trophic rate    [ ]       rate         By signing my name below, I, Maria D'Amico, attest that this documentation has been prepared under the direction and in the presence of Finn Zelaya MD.   Electronically Signed: Maria D'Amico, Scribe 22 @ 06:48    I, Finn Zealya, personally performed the services described in this documentation. All medical record entries made by the scribe were at my direction and in my presence. I have reviewed the chart and agree that the record reflects my personal performance and is accurate and complete.   Finn Zelaya MD.       Discussed with CT surgeon, Physician Assistant - Nurse Practitioner- Critical care medicine team.   Discussed at  AM / PM rounds.   Chart, labs , films reviewed.    Cumulative Critical Care Time Given Today:  45 min CRITICAL CARE ATTENDING - CTICU    MEDICATIONS  (STANDING):  albuterol/ipratropium for Nebulization 3 milliLiter(s) Nebulizer every 6 hours  aMIOdarone    Tablet 200 milliGRAM(s) Oral daily  aMIOdarone IVPB 150 milliGRAM(s) IV Intermittent once  aspirin  chewable 81 milliGRAM(s) Oral daily  atorvastatin 40 milliGRAM(s) Oral at bedtime  chlorhexidine 0.12% Liquid 15 milliLiter(s) Oral Mucosa every 12 hours  chlorhexidine 4% Liquid 1 Application(s) Topical <User Schedule>  CRRT Treatment    <Continuous>  heparin   Injectable 5000 Unit(s) SubCutaneous every 8 hours  heparin  Infusion Syringe 300 Unit(s)/Hr (0.6 mL/Hr) CRRT <Continuous>  insulin regular Infusion 3 Unit(s)/Hr (3 mL/Hr) IV Continuous <Continuous>  milrinone Infusion 0.1 MICROgram(s)/kG/Min (2.79 mL/Hr) IV Continuous <Continuous>  multivitamin/minerals/iron Oral Solution (CENTRUM) 15 milliLiter(s) Oral daily  pantoprazole  Injectable 40 milliGRAM(s) IV Push daily  Phoxillum Filtration BK 4 / 2.5 5000 milliLiter(s) (2400 mL/Hr) CRRT <Continuous>  Phoxillum Filtration BK 4 / 2.5 5000 milliLiter(s) (800 mL/Hr) CRRT <Continuous>  PrismaSOL Filtration BGK 4 / 2.5 5000 milliLiter(s) (200 mL/Hr) CRRT <Continuous>  thiamine 100 milliGRAM(s) Enteral Tube daily  vasopressin Infusion 0.02 Unit(s)/Min (3 mL/Hr) IV Continuous <Continuous>                                    8.6    10.43 )-----------( 90       ( 18 Dec 2022 02:21 )             28.2       12-18    134<L>  |  101  |  25<H>  ----------------------------<  106<H>  3.5   |  20<L>  |  1.75<H>    Ca    8.0<L>      18 Dec 2022 02:16  Phos  3.8     12-18  Mg     2.7     12-18    TPro  6.3  /  Alb  2.9<L>  /  TBili  0.5  /  DBili  x   /  AST  31  /  ALT  12  /  AlkPhos  98        PT/INR - ( 17 Dec 2022 00:12 )   PT: 11.6 sec;   INR: 1.01 ratio         PTT - ( 18 Dec 2022 02:21 )  PTT:27.2 sec    Mode: AC/ CMV (Assist Control/ Continuous Mandatory Ventilation)  RR (machine): 12  TV (machine): 600  FiO2: 50  PEEP: 5  ITime: 1  MAP: 10  PIP: 21      Daily     Daily Weight in k.7 (18 Dec 2022 00:00)       @ 07:01  -   @ 07:00  --------------------------------------------------------  IN: 1756.3 mL / OUT: 1468 mL / NET: 288.3 mL     @ 07:01  -   @ 06:48  --------------------------------------------------------  IN: 2925.7 mL / OUT: 2386 mL / NET: 539.7 mL      Critically Ill patient  : [ ] preoperative ,   [ ] post operative  [x] Non Operative    Requires :  [x ] Arterial Line   [ x] Central Line  [ ] PA catheter  [x] IABP removed yesterday  [ ] ECMO  [ ] LVAD  [x ] Ventilator  [ ] pacemaker [ ] Impella.  [x] CVVHD                      [x ] ABG's     [x ] Pulse Oxymetry Monitoring  Bedside evaluation , monitoring , treatment of hemodynamics , fluids , IVP/ IVCD meds.      Diagnosis:      - [facility] ECMO - arrest in SICU     ECMO Decannulation      POD 12/- IABP placed in cath lab - removed    POD 2 Mitral Clip x1, TRACH 7 Merari XLT prox with ENT    MI     Acute Pancreatitis     ARDS     Hemodynamic lability,  instability. Requires IVCD [x ] vasopressors [x] inotropes  [ ] vasodilator  [x]IVSS fluid  to maintain MAP, perfusion, C.I.     Post Arrest Shock state    Respiratory Failure - Tracheostomy     Requires chest PT, pulmonary toilet, ambu bagging, suctioning to maintain SaO2,  patent airway and treat atelectasis.     Ventilator Management:  [x ]AC-rest    [ x]CPAP-PS Wean    [ ]Trach Collar     [ ]Extubate    [ ] T-Piece  [ ]peep>5     Difficult weaning process - multiple organ system involvement in critically ill patient     CHF- acute [ x]   chronic [  ]    systolic [ x]   diatolic [ ]          - Echo- EF -   30-40% Severe segmental LV systolic dysfunction          [ ] RV dysfunction     - Cxr-cardiomegally, edema          - Clinical-  [x ]inotropes   [x]pressors  [ ]diuresis   [ ]IABP      [ ]LVAD   [x]Respiratory Failure.     Cardiogenic Shock - resolving    CVVHD  -50cc/hr    Thrombocytopenia     Hypotension     IABP removed yesterday morning    DM- IVCD Insulin    Requires bedside physical therapy, mobilization and total FPC care.     Tolerates NG / NJ feeds at [x] goal rate  [ ] trophic rate    [ ]       rate         By signing my name below, I, Maria D'Amico, attest that this documentation has been prepared under the direction and in the presence of Finn Zelaya MD.   Electronically Signed: Maria D'Amico, Scribe 22 @ 06:48    I, Finn Zelaya, personally performed the services described in this documentation. All medical record entries made by the scribe were at my direction and in my presence. I have reviewed the chart and agree that the record reflects my personal performance and is accurate and complete.   Finn Zelaya MD.       Discussed with CT surgeon, Physician Assistant - Nurse Practitioner- Critical care medicine team.   Discussed at  AM / PM rounds.   Chart, labs , films reviewed.    Cumulative Critical Care Time Given Today:  45 min

## 2022-12-18 NOTE — PROGRESS NOTE ADULT - PROBLEM SELECTOR PLAN 1
- Keep the Trach site clean and dry.   - Remove Sutures on POD #7.   - Keep the Omniflex for a week to avoid the manipulation of the stoma.   - Elevate HOB.   - Gentle suction prn.   - Vent per RT.   - ENT will follow.   - Call with questions.     # [id number]  ENT PA - Keep the Trach site clean and dry.   - Remove Sutures on POD #7.   - Keep the Omniflex for a week to avoid the manipulation of the stoma.   - Elevate HOB.   - Gentle suction prn.   - Vent per RT.   - ENT will follow.   - Call with questions.     # [id number]  ENT PA - Keep the Trach site clean and dry.   - Remove Sutures on POD #7.   - Keep the Omniflex for a week to avoid the manipulation of the stoma.   - Elevate HOB.   - Gentle suction prn.   - Vent per RT.   - ENT will follow.   - Call with questions.     # [id number]  ENT PA

## 2022-12-19 LAB
ALBUMIN SERPL ELPH-MCNC: 3.4 G/DL — SIGNIFICANT CHANGE UP (ref 3.3–5)
ALP SERPL-CCNC: 114 U/L — SIGNIFICANT CHANGE UP (ref 40–120)
ALT FLD-CCNC: 22 U/L — SIGNIFICANT CHANGE UP (ref 10–45)
AMYLASE P1 CFR SERPL: 193 U/L — HIGH (ref 25–125)
ANION GAP SERPL CALC-SCNC: 11 MMOL/L — SIGNIFICANT CHANGE UP (ref 5–17)
AST SERPL-CCNC: 62 U/L — HIGH (ref 10–40)
BILIRUB SERPL-MCNC: 0.9 MG/DL — SIGNIFICANT CHANGE UP (ref 0.2–1.2)
BUN SERPL-MCNC: 20 MG/DL — SIGNIFICANT CHANGE UP (ref 7–23)
CALCIUM SERPL-MCNC: 8 MG/DL — LOW (ref 8.4–10.5)
CHLORIDE SERPL-SCNC: 101 MMOL/L — SIGNIFICANT CHANGE UP (ref 96–108)
CO2 SERPL-SCNC: 22 MMOL/L — SIGNIFICANT CHANGE UP (ref 22–31)
CREAT SERPL-MCNC: 1.4 MG/DL — HIGH (ref 0.5–1.3)
EGFR: 57 ML/MIN/1.73M2 — LOW
GAS PNL BLDA: SIGNIFICANT CHANGE UP
GLUCOSE BLDC GLUCOMTR-MCNC: 116 MG/DL — HIGH (ref 70–99)
GLUCOSE BLDC GLUCOMTR-MCNC: 120 MG/DL — HIGH (ref 70–99)
GLUCOSE BLDC GLUCOMTR-MCNC: 123 MG/DL — HIGH (ref 70–99)
GLUCOSE BLDC GLUCOMTR-MCNC: 126 MG/DL — HIGH (ref 70–99)
GLUCOSE BLDC GLUCOMTR-MCNC: 156 MG/DL — HIGH (ref 70–99)
GLUCOSE BLDC GLUCOMTR-MCNC: 187 MG/DL — HIGH (ref 70–99)
GLUCOSE BLDC GLUCOMTR-MCNC: 188 MG/DL — HIGH (ref 70–99)
GLUCOSE SERPL-MCNC: 120 MG/DL — HIGH (ref 70–99)
GRAM STN FLD: SIGNIFICANT CHANGE UP
HCT VFR BLD CALC: 30.4 % — LOW (ref 39–50)
HGB BLD-MCNC: 9.7 G/DL — LOW (ref 13–17)
LIDOCAIN IGE QN: 504 U/L — HIGH (ref 7–60)
MAGNESIUM SERPL-MCNC: 2.9 MG/DL — HIGH (ref 1.6–2.6)
MCHC RBC-ENTMCNC: 29 PG — SIGNIFICANT CHANGE UP (ref 27–34)
MCHC RBC-ENTMCNC: 31.9 GM/DL — LOW (ref 32–36)
MCV RBC AUTO: 91 FL — SIGNIFICANT CHANGE UP (ref 80–100)
NRBC # BLD: 0 /100 WBCS — SIGNIFICANT CHANGE UP (ref 0–0)
PHOSPHATE SERPL-MCNC: 3.7 MG/DL — SIGNIFICANT CHANGE UP (ref 2.5–4.5)
PLATELET # BLD AUTO: 78 K/UL — LOW (ref 150–400)
POTASSIUM SERPL-MCNC: 4.4 MMOL/L — SIGNIFICANT CHANGE UP (ref 3.5–5.3)
POTASSIUM SERPL-SCNC: 4.4 MMOL/L — SIGNIFICANT CHANGE UP (ref 3.5–5.3)
PROT SERPL-MCNC: 7 G/DL — SIGNIFICANT CHANGE UP (ref 6–8.3)
RBC # BLD: 3.34 M/UL — LOW (ref 4.2–5.8)
RBC # FLD: 18.7 % — HIGH (ref 10.3–14.5)
SODIUM SERPL-SCNC: 134 MMOL/L — LOW (ref 135–145)
SPECIMEN SOURCE: SIGNIFICANT CHANGE UP
WBC # BLD: 13.15 K/UL — HIGH (ref 3.8–10.5)
WBC # FLD AUTO: 13.15 K/UL — HIGH (ref 3.8–10.5)

## 2022-12-19 PROCEDURE — 93306 TTE W/DOPPLER COMPLETE: CPT | Mod: 26

## 2022-12-19 PROCEDURE — 99231 SBSQ HOSP IP/OBS SF/LOW 25: CPT

## 2022-12-19 PROCEDURE — 99232 SBSQ HOSP IP/OBS MODERATE 35: CPT

## 2022-12-19 PROCEDURE — 99291 CRITICAL CARE FIRST HOUR: CPT

## 2022-12-19 PROCEDURE — 71045 X-RAY EXAM CHEST 1 VIEW: CPT | Mod: 26

## 2022-12-19 PROCEDURE — 99233 SBSQ HOSP IP/OBS HIGH 50: CPT | Mod: GC

## 2022-12-19 RX ORDER — SODIUM CHLORIDE 9 MG/ML
500 INJECTION, SOLUTION INTRAVENOUS ONCE
Refills: 0 | Status: DISCONTINUED | OUTPATIENT
Start: 2022-12-19 | End: 2022-12-19

## 2022-12-19 RX ORDER — HEPARIN SODIUM 5000 [USP'U]/ML
300 INJECTION INTRAVENOUS; SUBCUTANEOUS
Qty: 10000 | Refills: 0 | Status: DISCONTINUED | OUTPATIENT
Start: 2022-12-19 | End: 2022-12-19

## 2022-12-19 RX ORDER — INSULIN LISPRO 100/ML
VIAL (ML) SUBCUTANEOUS EVERY 6 HOURS
Refills: 0 | Status: DISCONTINUED | OUTPATIENT
Start: 2022-12-19 | End: 2022-12-27

## 2022-12-19 RX ORDER — CHLORHEXIDINE GLUCONATE 213 G/1000ML
1 SOLUTION TOPICAL
Refills: 0 | Status: DISCONTINUED | OUTPATIENT
Start: 2022-12-19 | End: 2023-01-20

## 2022-12-19 RX ORDER — CALCIUM GLUCONATE 100 MG/ML
2 VIAL (ML) INTRAVENOUS ONCE
Refills: 0 | Status: COMPLETED | OUTPATIENT
Start: 2022-12-19 | End: 2022-12-19

## 2022-12-19 RX ORDER — METOCLOPRAMIDE HCL 10 MG
10 TABLET ORAL ONCE
Refills: 0 | Status: COMPLETED | OUTPATIENT
Start: 2022-12-19 | End: 2022-12-19

## 2022-12-19 RX ADMIN — Medication 2: at 18:03

## 2022-12-19 RX ADMIN — HEPARIN SODIUM 5000 UNIT(S): 5000 INJECTION INTRAVENOUS; SUBCUTANEOUS at 17:07

## 2022-12-19 RX ADMIN — AMIODARONE HYDROCHLORIDE 200 MILLIGRAM(S): 400 TABLET ORAL at 05:25

## 2022-12-19 RX ADMIN — Medication 100 MILLIGRAM(S): at 11:26

## 2022-12-19 RX ADMIN — Medication 200 GRAM(S): at 00:56

## 2022-12-19 RX ADMIN — Medication 15 MILLILITER(S): at 11:26

## 2022-12-19 RX ADMIN — CHLORHEXIDINE GLUCONATE 15 MILLILITER(S): 213 SOLUTION TOPICAL at 17:07

## 2022-12-19 RX ADMIN — Medication 2: at 10:59

## 2022-12-19 RX ADMIN — ATORVASTATIN CALCIUM 40 MILLIGRAM(S): 80 TABLET, FILM COATED ORAL at 22:00

## 2022-12-19 RX ADMIN — CHLORHEXIDINE GLUCONATE 15 MILLILITER(S): 213 SOLUTION TOPICAL at 05:25

## 2022-12-19 RX ADMIN — Medication 81 MILLIGRAM(S): at 11:25

## 2022-12-19 RX ADMIN — HEPARIN SODIUM 5000 UNIT(S): 5000 INJECTION INTRAVENOUS; SUBCUTANEOUS at 00:06

## 2022-12-19 RX ADMIN — PANTOPRAZOLE SODIUM 40 MILLIGRAM(S): 20 TABLET, DELAYED RELEASE ORAL at 11:25

## 2022-12-19 RX ADMIN — Medication 10 MILLIGRAM(S): at 15:02

## 2022-12-19 RX ADMIN — HEPARIN SODIUM 5000 UNIT(S): 5000 INJECTION INTRAVENOUS; SUBCUTANEOUS at 10:38

## 2022-12-19 NOTE — PROGRESS NOTE ADULT - ASSESSMENT
62M CAD, CABG, DM, HTN, chol admitted with pancreatitis not necrotizing on the CT scan and cholecystitis.  Sent to SICU but developed respiratory failure presumed to be due to ARDS. Moved to the CTU for ECMO for cardiogenic shock vs ARDS.  ECMO out, IABP placed.  Leukocytosis    Fever, rising leucocytosis, SIRS/sepsis, ERIC  S/P meropenem, vanco and diflucan  - repeat UC and BC sent-NGTD    ct scan with improving pancreatitis   sp mv clip     cultures are negative    to follow off ab and reculture for any clinical change  Intermittent fever, improved WBC

## 2022-12-19 NOTE — PROGRESS NOTE ADULT - SUBJECTIVE AND OBJECTIVE BOX
ENT ISSUE/POD: S/P #7 Proximal XLT tracheostomy POD#3    HPI: 61 YO M with PMH of CABG, DM, HTN, HLD presenting as transfer from [place] for STEMI. Course c/b gallstone pancreatitis leading to ARDS, shock, cardiac arrest then placed on [facility] ECMO. Pt underwent ECMO decannulation. ENT called to evaluate for tracheostomy.  Now S/P #7 Proximal XLT Tracheostomy POD # 3.  Doing well on the ventilator. No bleeding noted.       PAST MEDICAL & SURGICAL HISTORY:    Allergies    No Known Allergies    Intolerances      MEDICATIONS  (STANDING):  aMIOdarone    Tablet 200 milliGRAM(s) Oral daily  aMIOdarone Infusion 0.5 mG/Min (16.7 mL/Hr) IV Continuous <Continuous>  aspirin  chewable 81 milliGRAM(s) Oral daily  atorvastatin 40 milliGRAM(s) Oral at bedtime  chlorhexidine 0.12% Liquid 15 milliLiter(s) Oral Mucosa every 12 hours  CRRT Treatment    <Continuous>  heparin   Injectable 5000 Unit(s) SubCutaneous every 8 hours  heparin  Infusion Syringe 300 Unit(s)/Hr (0.6 mL/Hr) CRRT <Continuous>  insulin lispro (ADMELOG) corrective regimen sliding scale   SubCutaneous every 6 hours  multivitamin/minerals/iron Oral Solution (CENTRUM) 15 milliLiter(s) Oral daily  pantoprazole  Injectable 40 milliGRAM(s) IV Push daily  Phoxillum Filtration BK 4 / 2.5 5000 milliLiter(s) (800 mL/Hr) CRRT <Continuous>  Phoxillum Filtration BK 4 / 2.5 5000 milliLiter(s) (2400 mL/Hr) CRRT <Continuous>  PrismaSOL Filtration BGK 4 / 2.5 5000 milliLiter(s) (200 mL/Hr) CRRT <Continuous>  thiamine 100 milliGRAM(s) Enteral Tube daily    MEDICATIONS  (PRN):  acetaminophen    Suspension .. 650 milliGRAM(s) Oral every 6 hours PRN Temp greater or equal to 38.5C (101.3F)    social history: see consult     family history: see consult     ROS:   ENT: all negative except as noted in HPI   Pulm: denies SOB, cough, hemoptysis  Neuro: denies numbness/tingling, loss of sensation  Endo: denies heat/cold intolerance, excessive sweating      Vital Signs Last 24 Hrs  T(C): 37.4 (19 Dec 2022 04:00), Max: 37.5 (19 Dec 2022 00:00)  T(F): 99.3 (19 Dec 2022 04:00), Max: 99.5 (19 Dec 2022 00:00)  HR: 88 (19 Dec 2022 08:19) (88 - 104)  BP: --  BP(mean): --  RR: 25 (19 Dec 2022 08:19) (12 - 37)  SpO2: 100% (19 Dec 2022 08:19) (96% - 100%)    Parameters below as of 19 Dec 2022 08:19  Patient On (Oxygen Delivery Method): tracheostomy collar    O2 Concentration (%): 40                          9.7    13.15 )-----------( 78       ( 19 Dec 2022 00:35 )             30.4    12-19    134<L>  |  101  |  20  ----------------------------<  120<H>  4.4   |  22  |  1.40<H>    Ca    8.0<L>      19 Dec 2022 00:34  Phos  3.7     12-19  Mg     2.9     12-19    TPro  7.0  /  Alb  3.4  /  TBili  0.9  /  DBili  x   /  AST  62<H>  /  ALT  22  /  AlkPhos  114  12-19   PTT - ( 18 Dec 2022 02:21 )  PTT:27.2 sec    PHYSICAL EXAM:  Gen: NAD, on vent  Skin: No rashes, bruises, or lesions  Head: Normocephalic, Atraumatic  Face: no edema, erythema, or fluctuance. Parotid glands soft without mass  Eyes: no scleral injection  Nose: Nares bilaterally patent, no discharge  Mouth: No Stridor / Drooling / Trismus.  Mucosa moist, tongue/uvula midline, oropharynx clear  Neck: #7 Proximal XLT Trach  secured with sutures x 4 and umbilical tie.  No hematoma or crepitus.  Flat, supple, no lymphadenopathy, trachea midline, no masses  Lymphatic: No lymphadenopathy  Resp: on vent   Neuro: facial nerve intact, no facial droop           ENT ISSUE/POD: S/P #7 Proximal XLT tracheostomy POD#3    HPI: 61 YO M with PMH of CABG, DM, HTN, HLD presenting as transfer from [place] for STEMI. Course c/b gallstone pancreatitis leading to ARDS, shock, cardiac arrest then placed on [facility] ECMO. Pt underwent ECMO decannulation. ENT called to evaluate for tracheostomy.  Now S/P #7 Proximal XLT Tracheostomy POD # 3.  Doing well on the ventilator. No bleeding noted.       PAST MEDICAL & SURGICAL HISTORY:    Allergies    No Known Allergies    Intolerances      MEDICATIONS  (STANDING):  aMIOdarone    Tablet 200 milliGRAM(s) Oral daily  aMIOdarone Infusion 0.5 mG/Min (16.7 mL/Hr) IV Continuous <Continuous>  aspirin  chewable 81 milliGRAM(s) Oral daily  atorvastatin 40 milliGRAM(s) Oral at bedtime  chlorhexidine 0.12% Liquid 15 milliLiter(s) Oral Mucosa every 12 hours  CRRT Treatment    <Continuous>  heparin   Injectable 5000 Unit(s) SubCutaneous every 8 hours  heparin  Infusion Syringe 300 Unit(s)/Hr (0.6 mL/Hr) CRRT <Continuous>  insulin lispro (ADMELOG) corrective regimen sliding scale   SubCutaneous every 6 hours  multivitamin/minerals/iron Oral Solution (CENTRUM) 15 milliLiter(s) Oral daily  pantoprazole  Injectable 40 milliGRAM(s) IV Push daily  Phoxillum Filtration BK 4 / 2.5 5000 milliLiter(s) (800 mL/Hr) CRRT <Continuous>  Phoxillum Filtration BK 4 / 2.5 5000 milliLiter(s) (2400 mL/Hr) CRRT <Continuous>  PrismaSOL Filtration BGK 4 / 2.5 5000 milliLiter(s) (200 mL/Hr) CRRT <Continuous>  thiamine 100 milliGRAM(s) Enteral Tube daily    MEDICATIONS  (PRN):  acetaminophen    Suspension .. 650 milliGRAM(s) Oral every 6 hours PRN Temp greater or equal to 38.5C (101.3F)    social history: see consult     family history: see consult     ROS:   ENT: all negative except as noted in HPI   Pulm: denies SOB, cough, hemoptysis  Neuro: denies numbness/tingling, loss of sensation  Endo: denies heat/cold intolerance, excessive sweating      Vital Signs Last 24 Hrs  T(C): 37.4 (19 Dec 2022 04:00), Max: 37.5 (19 Dec 2022 00:00)  T(F): 99.3 (19 Dec 2022 04:00), Max: 99.5 (19 Dec 2022 00:00)  HR: 88 (19 Dec 2022 08:19) (88 - 104)  BP: --  BP(mean): --  RR: 25 (19 Dec 2022 08:19) (12 - 37)  SpO2: 100% (19 Dec 2022 08:19) (96% - 100%)    Parameters below as of 19 Dec 2022 08:19  Patient On (Oxygen Delivery Method): tracheostomy collar    O2 Concentration (%): 40                          9.7    13.15 )-----------( 78       ( 19 Dec 2022 00:35 )             30.4    12-19    134<L>  |  101  |  20  ----------------------------<  120<H>  4.4   |  22  |  1.40<H>    Ca    8.0<L>      19 Dec 2022 00:34  Phos  3.7     12-19  Mg     2.9     12-19    TPro  7.0  /  Alb  3.4  /  TBili  0.9  /  DBili  x   /  AST  62<H>  /  ALT  22  /  AlkPhos  114  12-19   PTT - ( 18 Dec 2022 02:21 )  PTT:27.2 sec    PHYSICAL EXAM:  Gen: NAD, on vent  Skin: No rashes, bruises, or lesions  Head: Normocephalic, Atraumatic  Face: no edema, erythema, or fluctuance. Parotid glands soft without mass  Eyes: no scleral injection  Nose: Nares bilaterally patent, no discharge  Mouth: No Stridor / Drooling / Trismus.  Mucosa moist, tongue/uvula midline, oropharynx clear  Neck: #7 Proximal XLT Trach  secured with sutures x 4 and umbilical tie.  No hematoma or crepitus.  Flat, supple, no lymphadenopathy, trachea midline, no masses  Lymphatic: No lymphadenopathy  Resp: on vent   Neuro: facial nerve intact, no facial droop           ENT ISSUE/POD: S/P #7 Proximal XLT tracheostomy POD#3    HPI: 63 YO M with PMH of CABG, DM, HTN, HLD presenting as transfer from [place] for STEMI. Course c/b gallstone pancreatitis leading to ARDS, shock, cardiac arrest then placed on [facility] ECMO. Pt underwent ECMO decannulation. ENT called to evaluate for tracheostomy.  Now S/P #7 Proximal XLT Tracheostomy POD # 3.  Doing well on the ventilator. No bleeding noted.       PAST MEDICAL & SURGICAL HISTORY:    Allergies    No Known Allergies    Intolerances      MEDICATIONS  (STANDING):  aMIOdarone    Tablet 200 milliGRAM(s) Oral daily  aMIOdarone Infusion 0.5 mG/Min (16.7 mL/Hr) IV Continuous <Continuous>  aspirin  chewable 81 milliGRAM(s) Oral daily  atorvastatin 40 milliGRAM(s) Oral at bedtime  chlorhexidine 0.12% Liquid 15 milliLiter(s) Oral Mucosa every 12 hours  CRRT Treatment    <Continuous>  heparin   Injectable 5000 Unit(s) SubCutaneous every 8 hours  heparin  Infusion Syringe 300 Unit(s)/Hr (0.6 mL/Hr) CRRT <Continuous>  insulin lispro (ADMELOG) corrective regimen sliding scale   SubCutaneous every 6 hours  multivitamin/minerals/iron Oral Solution (CENTRUM) 15 milliLiter(s) Oral daily  pantoprazole  Injectable 40 milliGRAM(s) IV Push daily  Phoxillum Filtration BK 4 / 2.5 5000 milliLiter(s) (800 mL/Hr) CRRT <Continuous>  Phoxillum Filtration BK 4 / 2.5 5000 milliLiter(s) (2400 mL/Hr) CRRT <Continuous>  PrismaSOL Filtration BGK 4 / 2.5 5000 milliLiter(s) (200 mL/Hr) CRRT <Continuous>  thiamine 100 milliGRAM(s) Enteral Tube daily    MEDICATIONS  (PRN):  acetaminophen    Suspension .. 650 milliGRAM(s) Oral every 6 hours PRN Temp greater or equal to 38.5C (101.3F)    social history: see consult     family history: see consult     ROS:   ENT: all negative except as noted in HPI   Pulm: denies SOB, cough, hemoptysis  Neuro: denies numbness/tingling, loss of sensation  Endo: denies heat/cold intolerance, excessive sweating      Vital Signs Last 24 Hrs  T(C): 37.4 (19 Dec 2022 04:00), Max: 37.5 (19 Dec 2022 00:00)  T(F): 99.3 (19 Dec 2022 04:00), Max: 99.5 (19 Dec 2022 00:00)  HR: 88 (19 Dec 2022 08:19) (88 - 104)  BP: --  BP(mean): --  RR: 25 (19 Dec 2022 08:19) (12 - 37)  SpO2: 100% (19 Dec 2022 08:19) (96% - 100%)    Parameters below as of 19 Dec 2022 08:19  Patient On (Oxygen Delivery Method): tracheostomy collar    O2 Concentration (%): 40                          9.7    13.15 )-----------( 78       ( 19 Dec 2022 00:35 )             30.4    12-19    134<L>  |  101  |  20  ----------------------------<  120<H>  4.4   |  22  |  1.40<H>    Ca    8.0<L>      19 Dec 2022 00:34  Phos  3.7     12-19  Mg     2.9     12-19    TPro  7.0  /  Alb  3.4  /  TBili  0.9  /  DBili  x   /  AST  62<H>  /  ALT  22  /  AlkPhos  114  12-19   PTT - ( 18 Dec 2022 02:21 )  PTT:27.2 sec    PHYSICAL EXAM:  Gen: NAD, on vent  Skin: No rashes, bruises, or lesions  Head: Normocephalic, Atraumatic  Face: no edema, erythema, or fluctuance. Parotid glands soft without mass  Eyes: no scleral injection  Nose: Nares bilaterally patent, no discharge  Mouth: No Stridor / Drooling / Trismus.  Mucosa moist, tongue/uvula midline, oropharynx clear  Neck: #7 Proximal XLT Trach  secured with sutures x 4 and umbilical tie.  No hematoma or crepitus.  Flat, supple, no lymphadenopathy, trachea midline, no masses  Lymphatic: No lymphadenopathy  Resp: on vent   Neuro: facial nerve intact, no facial droop

## 2022-12-19 NOTE — PHYSICAL THERAPY INITIAL EVALUATION ADULT - PERTINENT HX OF CURRENT PROBLEM, REHAB EVAL
63 y/o male with PMH of CABG, DM, HTN, HLD presenting as transfer from [place] for c/f STEMI. Upon arrival pt w/ multiple episodes of non-bloody diarrhea and emesis with associated abdominal pain and chest pain & SOB. Hosp course c/b Cardiogenic shock s/p VA ECMO b/s (decannulated 12/8) w/ IABP placement (d/c'd 12/17), s/p Mitral clip x1 and tracheostomy 12/16/22. Pt now on trach collar and +CVV. 61 y/o male with PMH of CABG, DM, HTN, HLD presenting as transfer from [place] for c/f STEMI. Upon arrival pt w/ multiple episodes of non-bloody diarrhea and emesis with associated abdominal pain and chest pain & SOB. Hosp course c/b Cardiogenic shock s/p VA ECMO b/s (decannulated 12/8) w/ IABP placement (d/c'd 12/17), s/p Mitral clip x1 and tracheostomy 12/16/22. Pt now on trach collar and +CVV. 61 y/o male with PMH of CABG, DM, HTN, HLD presenting as transfer from [place] for c/f STEMI. Upon arrival pt w/ multiple episodes of non-bloody diarrhea and emesis with associated abdominal pain and chest pain & SOB. Hosp course c/b Cardiogenic shock s/p VA ECMO b/s (decannulated 12/8) w/ IABP placement (d/c'd 12/17), s/p Mitral clip x1 and tracheostomy 12/16/22. Pt now on trach collar and +CVV.

## 2022-12-19 NOTE — PROGRESS NOTE ADULT - ASSESSMENT
61 YO M now S/P #7 Proximal XLT Tracheostomy POD # 3. In inferior and superior surgicel around stoma removed. Doing well on the ventilator. No bleeding noted.      63 YO M now S/P #7 Proximal XLT Tracheostomy POD # 3. In inferior and superior surgicel around stoma removed. Doing well on the ventilator. No bleeding noted.

## 2022-12-19 NOTE — PROGRESS NOTE ADULT - SUBJECTIVE AND OBJECTIVE BOX
Patient seen and examined at the bedside.    Remained critically ill on continuous ICU monitoring.      Brief Summary:  Cardiogenic shock s/p [facility] ECMO, decannulated 12/8/22.    24 Hour events:  - Continue Trach collar as tolerated   -     OBJECTIVE:  Vital Signs Last 24 Hrs  T(C): 37.4 (19 Dec 2022 04:00), Max: 37.5 (19 Dec 2022 00:00)  T(F): 99.3 (19 Dec 2022 04:00), Max: 99.5 (19 Dec 2022 00:00)  HR: 88 (19 Dec 2022 08:19) (88 - 104)  BP: --  BP(mean): --  RR: 24 (19 Dec 2022 07:00) (12 - 37)  SpO2: 100% (19 Dec 2022 08:19) (96% - 100%)    Parameters below as of 19 Dec 2022 04:00  Patient On (Oxygen Delivery Method): ventilator    O2 Concentration (%): 50    Mode: standby  FiO2: 40              Physical Exam:   General: Intubated   Neurology: nonfocal   Eyes: bilateral pupils equal and reactive   ENT/Neck: +Trach, Neck supple, No JVD   Respiratory: Clear bilaterally   CV: S1S2, no murmurs        [x] Sternal dressing        [x] Sinus rhythm   Abdominal: Soft, NT, ND +BS   Extremities: 1-2+ pedal edema noted, + peripheral pulses   Skin: No Rashes, Hematoma, Ecchymosis         -------------------------------------------------------------------------------------------------------------------------------                        9.7    13.15 )-----------( 78       ( 19 Dec 2022 00:35 )             30.4     12-19    134<L>  |  101  |  20  ----------------------------<  120<H>  4.4   |  22  |  1.40<H>    Ca    8.0<L>      19 Dec 2022 00:34  Phos  3.7     12-19  Mg     2.9     12-19    TPro  7.0  /  Alb  3.4  /  TBili  0.9  /  DBili  x   /  AST  62<H>  /  ALT  22  /  AlkPhos  114  12-19    LIVER FUNCTIONS - ( 19 Dec 2022 00:34 )  Alb: 3.4 g/dL / Pro: 7.0 g/dL / ALK PHOS: 114 U/L / ALT: 22 U/L / AST: 62 U/L / GGT: x           PTT - ( 18 Dec 2022 02:21 )  PTT:27.2 sec  ABG - ( 19 Dec 2022 05:55 )  pH, Arterial: 7.43  pH, Blood: x     /  pCO2: 33    /  pO2: 76    / HCO3: 22    / Base Excess: -1.9  /  SaO2: 97.7    ------------------------------------------------------------------------------------------------------------------------------  Assessment:  63 y/o male with PMH of CABG, DM, HTN, HLD presenting as transfer from [place] for c/f STEMI. PTA arrival received 325 aspirin, 600 plavix, and no heparin drip started. Around 1:30 am patient had multiple episodes of non-bloody diarrhea and emesis with associated abdominal pain and chest pain, unable to localize, non-radiating, with associated SOB and no associated palpitations or diaphoresis. No recent fevers/chills, cough, rashes, or changes in urination. Does report mild diffuse back pain; started 5 days ago in setting on injury while walking and lifting objects. Denies focal weakness, numbness/tingling, or LOC due does report mild dizziness.    Mitral regurgitation s/p Mitral clip x1 12/16/22  Acute respiratory distress/failure s/p Tracheostomy 12/16/22   cardiogenic shock s/p VA ECMO decannulated 12/8  IABP placed ( IABP dc'ed 12/17)   Cardiogenic shock  Hemodynamic instability  Acute renal failure  Leukocytosis   Thrombocytopenia  Anemia  Acute pancreatitis    Plan:   ***Neuro***  CT head showed 4mm subdural hematoma 11/26: repeat CT head unchanged 12/01  Soft palate laceration, ENT scoped 12/8, stable, no acute intervention at this time  Repeat Head CT 12/13 Similar minimal increased density along the right aspect of the posterior   falx and right tentorial leaf, consistent with minimal residual subdural   hemorrhage.  No parenchymal hemorrhage or brain edema.  Acute pain control with Tylenol    ***Cardiovascular***  Invasive hemodynamic monitoring, assess perfusion indices   At high risk for hemodynamic instability and cardiac arrhythmias.  Lactate 1.6, continue trending   Continue low dose Milrinone for now.  IABP dc'ed 12/17  AC Therapy with Heparin gtt pAF/flutter   Amiodarone for rate control  c/w ASA /Statin     ***Pulmonary***  Respiratory failure s/p Tracheostomy - continue TC as tolerated   Postoperative acute respiratory insufficiency  Wean ventilator as tolerated.   Deep breathing and coughing exercises.  Wean oxygen as able.    ***GI***  Protein calorie malnutrition - Tube feeds via nasal feeding tube  Pancreatitis   Protonix for stress ulcer prophylaxis     ***Renal***  ERIC, renal following, continue CRRT  Volume removal as tolerated.  Replete electrolytes as indicated.    ***ID***  No antibiotic coverage.     ***Endocrine***  Hx of DM2, continue glycemic control w/ ISS.        Patient requires continuous monitoring with bedside rhythm monitoring, pulse oximetry monitoring, and continuous central venous and arterial pressure monitoring; and intermittent blood gas analysis. Care plan discussed with the ICU care team.   Patient remained critical, at risk for life threatening decompensation.    I have spent 30 minutes providing critical care management to this patient.    By signing my name below, I, Rosio Alonzo, attest that this documentation has been prepared under the direction and in the presence of Jade Rosas MD  Electronically signed: Brian Burnett, 12-19-22 @ 08:24    JOHNATHAN, Jade Rosas MD, personally performed the services described in this documentation. all medical record entries made by the scribe were at my direction and in my presence. I have reviewed the chart and agree that the record reflects my personal performance and is accurate and complete  Electronically signed: Jade Rosas MD Patient seen and examined at the bedside.    Remained critically ill on continuous ICU monitoring.      Brief Summary:  Cardiogenic shock s/p VA ECMO, decannulated 12/8/22.    24 Hour events:  - Continue Trach collar as tolerated   -     OBJECTIVE:  Vital Signs Last 24 Hrs  T(C): 37.4 (19 Dec 2022 04:00), Max: 37.5 (19 Dec 2022 00:00)  T(F): 99.3 (19 Dec 2022 04:00), Max: 99.5 (19 Dec 2022 00:00)  HR: 88 (19 Dec 2022 08:19) (88 - 104)  BP: --  BP(mean): --  RR: 24 (19 Dec 2022 07:00) (12 - 37)  SpO2: 100% (19 Dec 2022 08:19) (96% - 100%)    Parameters below as of 19 Dec 2022 04:00  Patient On (Oxygen Delivery Method): ventilator    O2 Concentration (%): 50    Mode: standby  FiO2: 40              Physical Exam:   General: Intubated   Neurology: nonfocal   Eyes: bilateral pupils equal and reactive   ENT/Neck: +Trach, Neck supple, No JVD   Respiratory: Clear bilaterally   CV: S1S2, no murmurs        [x] Sternal dressing        [x] Sinus rhythm   Abdominal: Soft, NT, ND +BS   Extremities: 1-2+ pedal edema noted, + peripheral pulses   Skin: No Rashes, Hematoma, Ecchymosis         -------------------------------------------------------------------------------------------------------------------------------                        9.7    13.15 )-----------( 78       ( 19 Dec 2022 00:35 )             30.4     12-19    134<L>  |  101  |  20  ----------------------------<  120<H>  4.4   |  22  |  1.40<H>    Ca    8.0<L>      19 Dec 2022 00:34  Phos  3.7     12-19  Mg     2.9     12-19    TPro  7.0  /  Alb  3.4  /  TBili  0.9  /  DBili  x   /  AST  62<H>  /  ALT  22  /  AlkPhos  114  12-19    LIVER FUNCTIONS - ( 19 Dec 2022 00:34 )  Alb: 3.4 g/dL / Pro: 7.0 g/dL / ALK PHOS: 114 U/L / ALT: 22 U/L / AST: 62 U/L / GGT: x           PTT - ( 18 Dec 2022 02:21 )  PTT:27.2 sec  ABG - ( 19 Dec 2022 05:55 )  pH, Arterial: 7.43  pH, Blood: x     /  pCO2: 33    /  pO2: 76    / HCO3: 22    / Base Excess: -1.9  /  SaO2: 97.7    ------------------------------------------------------------------------------------------------------------------------------  Assessment:  63 y/o male with PMH of CABG, DM, HTN, HLD presenting as transfer from [place] for c/f STEMI. PTA arrival received 325 aspirin, 600 plavix, and no heparin drip started. Around 1:30 am patient had multiple episodes of non-bloody diarrhea and emesis with associated abdominal pain and chest pain, unable to localize, non-radiating, with associated SOB and no associated palpitations or diaphoresis. No recent fevers/chills, cough, rashes, or changes in urination. Does report mild diffuse back pain; started 5 days ago in setting on injury while walking and lifting objects. Denies focal weakness, numbness/tingling, or LOC due does report mild dizziness.    Mitral regurgitation s/p Mitral clip x1 12/16/22  Acute respiratory distress/failure s/p Tracheostomy 12/16/22   cardiogenic shock s/p VA ECMO decannulated 12/8  IABP placed ( IABP dc'ed 12/17)   Cardiogenic shock  Hemodynamic instability  Acute renal failure  Leukocytosis   Thrombocytopenia  Anemia  Acute pancreatitis    Plan:   ***Neuro***  CT head showed 4mm subdural hematoma 11/26: repeat CT head unchanged 12/01  Soft palate laceration, ENT scoped 12/8, stable, no acute intervention at this time  Repeat Head CT 12/13 Similar minimal increased density along the right aspect of the posterior   falx and right tentorial leaf, consistent with minimal residual subdural   hemorrhage.  No parenchymal hemorrhage or brain edema.  Acute pain control with Tylenol    ***Cardiovascular***  Invasive hemodynamic monitoring, assess perfusion indices   At high risk for hemodynamic instability and cardiac arrhythmias.  Lactate 1.6, continue trending   Continue low dose Milrinone for now.  IABP dc'ed 12/17  AC Therapy with Heparin gtt pAF/flutter   Amiodarone for rate control  c/w ASA /Statin     ***Pulmonary***  Respiratory failure s/p Tracheostomy - continue TC as tolerated   Postoperative acute respiratory insufficiency  Wean ventilator as tolerated.   Deep breathing and coughing exercises.  Wean oxygen as able.    ***GI***  Protein calorie malnutrition - Tube feeds via nasal feeding tube  Pancreatitis   Protonix for stress ulcer prophylaxis     ***Renal***  ERIC, renal following, continue CRRT  Volume removal as tolerated.  Replete electrolytes as indicated.    ***ID***  No antibiotic coverage.     ***Endocrine***  Hx of DM2, continue glycemic control w/ ISS.        Patient requires continuous monitoring with bedside rhythm monitoring, pulse oximetry monitoring, and continuous central venous and arterial pressure monitoring; and intermittent blood gas analysis. Care plan discussed with the ICU care team.   Patient remained critical, at risk for life threatening decompensation.    I have spent 30 minutes providing critical care management to this patient.    By signing my name below, I, Rosio Alonzo, attest that this documentation has been prepared under the direction and in the presence of Jade Rosas MD  Electronically signed: Brian Burnett, 12-19-22 @ 08:24    JOHNATHAN, Jade Rosas MD, personally performed the services described in this documentation. all medical record entries made by the scribe were at my direction and in my presence. I have reviewed the chart and agree that the record reflects my personal performance and is accurate and complete  Electronically signed: Jade Rosas MD Patient seen and examined at the bedside.    Remained critically ill on continuous ICU monitoring.      Brief Summary:  Cardiogenic shock s/p VA ECMO, decannulated 12/8/22.    24 Hour events:  - Continue Trach collar as tolerated   -     OBJECTIVE:  Vital Signs Last 24 Hrs  T(C): 37.4 (19 Dec 2022 04:00), Max: 37.5 (19 Dec 2022 00:00)  T(F): 99.3 (19 Dec 2022 04:00), Max: 99.5 (19 Dec 2022 00:00)  HR: 88 (19 Dec 2022 08:19) (88 - 104)  BP: --  BP(mean): --  RR: 24 (19 Dec 2022 07:00) (12 - 37)  SpO2: 100% (19 Dec 2022 08:19) (96% - 100%)    Parameters below as of 19 Dec 2022 04:00  Patient On (Oxygen Delivery Method): ventilator    O2 Concentration (%): 50    Mode: standby  FiO2: 40              Physical Exam:   General: Intubated   Neurology: nonfocal   Eyes: bilateral pupils equal and reactive   ENT/Neck: +Trach, Neck supple, No JVD   Respiratory: Clear bilaterally   CV: S1S2, no murmurs        [x] Sternal dressing        [x] Sinus rhythm   Abdominal: Soft, NT, ND +BS   Extremities: 1-2+ pedal edema noted, + peripheral pulses   Skin: No Rashes, Hematoma, Ecchymosis         -------------------------------------------------------------------------------------------------------------------------------                        9.7    13.15 )-----------( 78       ( 19 Dec 2022 00:35 )             30.4     12-19    134<L>  |  101  |  20  ----------------------------<  120<H>  4.4   |  22  |  1.40<H>    Ca    8.0<L>      19 Dec 2022 00:34  Phos  3.7     12-19  Mg     2.9     12-19    TPro  7.0  /  Alb  3.4  /  TBili  0.9  /  DBili  x   /  AST  62<H>  /  ALT  22  /  AlkPhos  114  12-19    LIVER FUNCTIONS - ( 19 Dec 2022 00:34 )  Alb: 3.4 g/dL / Pro: 7.0 g/dL / ALK PHOS: 114 U/L / ALT: 22 U/L / AST: 62 U/L / GGT: x           PTT - ( 18 Dec 2022 02:21 )  PTT:27.2 sec  ABG - ( 19 Dec 2022 05:55 )  pH, Arterial: 7.43  pH, Blood: x     /  pCO2: 33    /  pO2: 76    / HCO3: 22    / Base Excess: -1.9  /  SaO2: 97.7    ------------------------------------------------------------------------------------------------------------------------------  Assessment:  61 y/o male with PMH of CABG, DM, HTN, HLD presenting as transfer from [place] for c/f STEMI. PTA arrival received 325 aspirin, 600 plavix, and no heparin drip started. Around 1:30 am patient had multiple episodes of non-bloody diarrhea and emesis with associated abdominal pain and chest pain, unable to localize, non-radiating, with associated SOB and no associated palpitations or diaphoresis. No recent fevers/chills, cough, rashes, or changes in urination. Does report mild diffuse back pain; started 5 days ago in setting on injury while walking and lifting objects. Denies focal weakness, numbness/tingling, or LOC due does report mild dizziness.    Mitral regurgitation s/p Mitral clip x1 12/16/22  Acute respiratory distress/failure s/p Tracheostomy 12/16/22   cardiogenic shock s/p VA ECMO decannulated 12/8  IABP placed ( IABP dc'ed 12/17)   Cardiogenic shock  Hemodynamic instability  Acute renal failure  Leukocytosis   Thrombocytopenia  Anemia  Acute pancreatitis    Plan:   ***Neuro***  CT head showed 4mm subdural hematoma 11/26: repeat CT head unchanged 12/01  Soft palate laceration, ENT scoped 12/8, stable, no acute intervention at this time  Repeat Head CT 12/13 Similar minimal increased density along the right aspect of the posterior   falx and right tentorial leaf, consistent with minimal residual subdural   hemorrhage.  No parenchymal hemorrhage or brain edema.  Acute pain control with Tylenol    ***Cardiovascular***  Invasive hemodynamic monitoring, assess perfusion indices   At high risk for hemodynamic instability and cardiac arrhythmias.  Lactate 1.6, continue trending   Continue low dose Milrinone for now.  IABP dc'ed 12/17  AC Therapy with Heparin gtt pAF/flutter   Amiodarone for rate control  c/w ASA /Statin     ***Pulmonary***  Respiratory failure s/p Tracheostomy - continue TC as tolerated   Postoperative acute respiratory insufficiency  Wean ventilator as tolerated.   Deep breathing and coughing exercises.  Wean oxygen as able.    ***GI***  Protein calorie malnutrition - Tube feeds via nasal feeding tube  Pancreatitis   Protonix for stress ulcer prophylaxis     ***Renal***  ERIC, renal following, continue CRRT  Volume removal as tolerated.  Replete electrolytes as indicated.    ***ID***  No antibiotic coverage.     ***Endocrine***  Hx of DM2, continue glycemic control w/ ISS.        Patient requires continuous monitoring with bedside rhythm monitoring, pulse oximetry monitoring, and continuous central venous and arterial pressure monitoring; and intermittent blood gas analysis. Care plan discussed with the ICU care team.   Patient remained critical, at risk for life threatening decompensation.    I have spent 30 minutes providing critical care management to this patient.    By signing my name below, I, Rosio Alonzo, attest that this documentation has been prepared under the direction and in the presence of Jade Rosas MD  Electronically signed: Brian Burnett, 12-19-22 @ 08:24    JOHNATHAN, Jade Rosas MD, personally performed the services described in this documentation. all medical record entries made by the scribe were at my direction and in my presence. I have reviewed the chart and agree that the record reflects my personal performance and is accurate and complete  Electronically signed: Jade Rosas MD Patient seen and examined at the bedside.    Remained critically ill on continuous ICU monitoring.      Brief Summary:  63 yo M admitted with cardiogenic shock s/p VA ECMO, decannulated 12/8/22.    24 Hour events:  - Tolerating trach collar trials.  - OOB to chair  - CRRT with gentle volume removal      OBJECTIVE:  Vital Signs Last 24 Hrs  T(C): 37.4 (19 Dec 2022 04:00), Max: 37.5 (19 Dec 2022 00:00)  T(F): 99.3 (19 Dec 2022 04:00), Max: 99.5 (19 Dec 2022 00:00)  HR: 88 (19 Dec 2022 08:19) (88 - 104)  BP: --  BP(mean): --  RR: 24 (19 Dec 2022 07:00) (12 - 37)  SpO2: 100% (19 Dec 2022 08:19) (96% - 100%)    Parameters below as of 19 Dec 2022 04:00  Patient On (Oxygen Delivery Method): ventilator    O2 Concentration (%): 50    Mode: standby  FiO2: 40              Physical Exam:   General: Trach to vent, no acute distress  Neurology: Following commands  Eyes: bilateral pupils equal and reactive   ENT/Neck: +Trach, Neck supple, No JVD   Respiratory: Clear bilaterally   CV: S1S2, no murmurs        [x] Sternal dressing        [x] Sinus rhythm   Abdominal: Soft, NT, ND +BS   Extremities: Warm  Right groin wound VAC intact    -------------------------------------------------------------------------------------------------------------------------------                        9.7    13.15 )-----------( 78       ( 19 Dec 2022 00:35 )             30.4     12-19    134<L>  |  101  |  20  ----------------------------<  120<H>  4.4   |  22  |  1.40<H>    Ca    8.0<L>      19 Dec 2022 00:34  Phos  3.7     12-19  Mg     2.9     12-19    TPro  7.0  /  Alb  3.4  /  TBili  0.9  /  DBili  x   /  AST  62<H>  /  ALT  22  /  AlkPhos  114  12-19    LIVER FUNCTIONS - ( 19 Dec 2022 00:34 )  Alb: 3.4 g/dL / Pro: 7.0 g/dL / ALK PHOS: 114 U/L / ALT: 22 U/L / AST: 62 U/L / GGT: x           PTT - ( 18 Dec 2022 02:21 )  PTT:27.2 sec  ABG - ( 19 Dec 2022 05:55 )  pH, Arterial: 7.43  pH, Blood: x     /  pCO2: 33    /  pO2: 76    / HCO3: 22    / Base Excess: -1.9  /  SaO2: 97.7    ------------------------------------------------------------------------------------------------------------------------------  Assessment:  63 y/o male with PMH of CABG, DM, HTN, HLD presenting as transfer from [place] for c/f STEMI. PTA arrival received 325 aspirin, 600 plavix, and no heparin drip started. Around 1:30 am patient had multiple episodes of non-bloody diarrhea and emesis with associated abdominal pain and chest pain, unable to localize, non-radiating, with associated SOB and no associated palpitations or diaphoresis. No recent fevers/chills, cough, rashes, or changes in urination. Does report mild diffuse back pain; started 5 days ago in setting on injury while walking and lifting objects. Denies focal weakness, numbness/tingling, or LOC due does report mild dizziness.    Cardiogenic shock s/p VA ECMO decannulated 12/8  Mitral regurgitation s/p Mitral clip x1 12/16/22  Acute respiratory distress/failure s/p Tracheostomy 12/16/22   IABP placed ( IABP dc'ed 12/17)   At high risk for hemodynamic instability  ERIC  Leukocytosis   Thrombocytopenia  Anemia  Acute pancreatitis    Plan:   ***Neuro***  CT head showed 4mm subdural hematoma 11/26: repeat CT head unchanged 12/01  Soft palate laceration, ENT scoped 12/8, stable, no acute intervention at this time  Repeat Head CT 12/13 Similar minimal increased density along the right aspect of the posterior   falx and right tentorial leaf, consistent with minimal residual subdural   hemorrhage.  No parenchymal hemorrhage or brain edema.  Acute pain control with Tylenol    ***Cardiovascular***  At high risk for hemodynamic instability and cardiac arrhythmias.  Off inotropes and pressors currently.  AC Therapy with Heparin gtt pAF/flutter   Amiodarone for rate control - transitioned to PO  ASA /Statin daily    ***Pulmonary***  Respiratory failure s/p Tracheostomy - continue TC trials  Wean ventilator as tolerated.   Deep breathing and coughing exercises.  Wean oxygen as able.    ***GI***  Protein calorie malnutrition - Tube feeds via nasal feeding tube  Pancreatitis   Protonix for stress ulcer prophylaxis     ***Renal***  ERIC, renal following, continue CRRT  Volume removal as tolerated.  Replete electrolytes as indicated.    ***ID***  Leukocytosis - off antibiotics currently.  Appreciate ID input    ***Endocrine***  Hx of DM2, continue glycemic control w/ ISS.        Patient requires continuous monitoring with bedside rhythm monitoring, pulse oximetry monitoring, and continuous central venous and arterial pressure monitoring; and intermittent blood gas analysis. Care plan discussed with the ICU care team.   Patient remained critical, at risk for life threatening decompensation.    I have spent 38 minutes providing critical care management to this patient.    By signing my name below, I, Rosio Alonzo, attest that this documentation has been prepared under the direction and in the presence of Jade Rosas MD  Electronically signed: Brian Burnett, 12-19-22 @ 08:24    I, Jade Rosas MD, personally performed the services described in this documentation. all medical record entries made by the scribe were at my direction and in my presence. I have reviewed the chart and agree that the record reflects my personal performance and is accurate and complete  Electronically signed: Jade Rosas MD Patient seen and examined at the bedside.    Remained critically ill on continuous ICU monitoring.      Brief Summary:  63 yo M admitted with cardiogenic shock s/p VA ECMO, decannulated 12/8/22.    24 Hour events:  - Tolerating trach collar trials.  - OOB to chair  - CRRT with gentle volume removal      OBJECTIVE:  Vital Signs Last 24 Hrs  T(C): 37.4 (19 Dec 2022 04:00), Max: 37.5 (19 Dec 2022 00:00)  T(F): 99.3 (19 Dec 2022 04:00), Max: 99.5 (19 Dec 2022 00:00)  HR: 88 (19 Dec 2022 08:19) (88 - 104)  BP: --  BP(mean): --  RR: 24 (19 Dec 2022 07:00) (12 - 37)  SpO2: 100% (19 Dec 2022 08:19) (96% - 100%)    Parameters below as of 19 Dec 2022 04:00  Patient On (Oxygen Delivery Method): ventilator    O2 Concentration (%): 50    Mode: standby  FiO2: 40              Physical Exam:   General: Trach to vent, no acute distress  Neurology: Following commands  Eyes: bilateral pupils equal and reactive   ENT/Neck: +Trach, Neck supple, No JVD   Respiratory: Clear bilaterally   CV: S1S2, no murmurs        [x] Sternal dressing        [x] Sinus rhythm   Abdominal: Soft, NT, ND +BS   Extremities: Warm  Right groin wound VAC intact    -------------------------------------------------------------------------------------------------------------------------------                        9.7    13.15 )-----------( 78       ( 19 Dec 2022 00:35 )             30.4     12-19    134<L>  |  101  |  20  ----------------------------<  120<H>  4.4   |  22  |  1.40<H>    Ca    8.0<L>      19 Dec 2022 00:34  Phos  3.7     12-19  Mg     2.9     12-19    TPro  7.0  /  Alb  3.4  /  TBili  0.9  /  DBili  x   /  AST  62<H>  /  ALT  22  /  AlkPhos  114  12-19    LIVER FUNCTIONS - ( 19 Dec 2022 00:34 )  Alb: 3.4 g/dL / Pro: 7.0 g/dL / ALK PHOS: 114 U/L / ALT: 22 U/L / AST: 62 U/L / GGT: x           PTT - ( 18 Dec 2022 02:21 )  PTT:27.2 sec  ABG - ( 19 Dec 2022 05:55 )  pH, Arterial: 7.43  pH, Blood: x     /  pCO2: 33    /  pO2: 76    / HCO3: 22    / Base Excess: -1.9  /  SaO2: 97.7    ------------------------------------------------------------------------------------------------------------------------------  Assessment:  63 y/o male with PMH of CABG, DM, HTN, HLD presenting as transfer from [place] for c/f STEMI. PTA arrival received 325 aspirin, 600 plavix, and no heparin drip started. Around 1:30 am patient had multiple episodes of non-bloody diarrhea and emesis with associated abdominal pain and chest pain, unable to localize, non-radiating, with associated SOB and no associated palpitations or diaphoresis. No recent fevers/chills, cough, rashes, or changes in urination. Does report mild diffuse back pain; started 5 days ago in setting on injury while walking and lifting objects. Denies focal weakness, numbness/tingling, or LOC due does report mild dizziness.    Cardiogenic shock s/p VA ECMO decannulated 12/8  Mitral regurgitation s/p Mitral clip x1 12/16/22  Acute respiratory distress/failure s/p Tracheostomy 12/16/22   IABP placed ( IABP dc'ed 12/17)   At high risk for hemodynamic instability  ERIC  Leukocytosis   Thrombocytopenia  Anemia  Acute pancreatitis    Plan:   ***Neuro***  CT head showed 4mm subdural hematoma 11/26: repeat CT head unchanged 12/01  Soft palate laceration, ENT scoped 12/8, stable, no acute intervention at this time  Repeat Head CT 12/13 Similar minimal increased density along the right aspect of the posterior   falx and right tentorial leaf, consistent with minimal residual subdural   hemorrhage.  No parenchymal hemorrhage or brain edema.  Acute pain control with Tylenol    ***Cardiovascular***  At high risk for hemodynamic instability and cardiac arrhythmias.  Off inotropes and pressors currently.  AC Therapy with Heparin gtt pAF/flutter   Amiodarone for rate control - transitioned to PO  ASA /Statin daily    ***Pulmonary***  Respiratory failure s/p Tracheostomy - continue TC trials  Wean ventilator as tolerated.   Deep breathing and coughing exercises.  Wean oxygen as able.    ***GI***  Protein calorie malnutrition - Tube feeds via nasal feeding tube  Pancreatitis   Protonix for stress ulcer prophylaxis     ***Renal***  ERIC, renal following, continue CRRT  Volume removal as tolerated.  Replete electrolytes as indicated.    ***ID***  Leukocytosis - off antibiotics currently.  Appreciate ID input    ***Endocrine***  Hx of DM2, continue glycemic control w/ ISS.        Patient requires continuous monitoring with bedside rhythm monitoring, pulse oximetry monitoring, and continuous central venous and arterial pressure monitoring; and intermittent blood gas analysis. Care plan discussed with the ICU care team.   Patient remained critical, at risk for life threatening decompensation.    I have spent 38 minutes providing critical care management to this patient.    By signing my name below, I, Rosio Alonzo, attest that this documentation has been prepared under the direction and in the presence of Jade Rosas MD  Electronically signed: Brian Burnett, 12-19-22 @ 08:24    I, Jade Rosas MD, personally performed the services described in this documentation. all medical record entries made by the scribe were at my direction and in my presence. I have reviewed the chart and agree that the record reflects my personal performance and is accurate and complete  Electronically signed: Jade Rosas MD Patient seen and examined at the bedside.    Remained critically ill on continuous ICU monitoring.      Brief Summary:  63 yo M admitted with cardiogenic shock s/p VA ECMO, decannulated 12/8/22.    24 Hour events:  - Tolerating trach collar trials.  - OOB to chair  - CRRT with gentle volume removal      OBJECTIVE:  Vital Signs Last 24 Hrs  T(C): 37.4 (19 Dec 2022 04:00), Max: 37.5 (19 Dec 2022 00:00)  T(F): 99.3 (19 Dec 2022 04:00), Max: 99.5 (19 Dec 2022 00:00)  HR: 88 (19 Dec 2022 08:19) (88 - 104)  BP: --  BP(mean): --  RR: 24 (19 Dec 2022 07:00) (12 - 37)  SpO2: 100% (19 Dec 2022 08:19) (96% - 100%)    Parameters below as of 19 Dec 2022 04:00  Patient On (Oxygen Delivery Method): ventilator    O2 Concentration (%): 50    Mode: standby  FiO2: 40              Physical Exam:   General: Trach to vent, no acute distress  Neurology: Following commands  Eyes: bilateral pupils equal and reactive   ENT/Neck: +Trach, Neck supple, No JVD   Respiratory: Clear bilaterally   CV: S1S2, no murmurs        [x] Sternal dressing        [x] Sinus rhythm   Abdominal: Soft, NT, ND +BS   Extremities: Warm  Right groin wound VAC intact    -------------------------------------------------------------------------------------------------------------------------------                        9.7    13.15 )-----------( 78       ( 19 Dec 2022 00:35 )             30.4     12-19    134<L>  |  101  |  20  ----------------------------<  120<H>  4.4   |  22  |  1.40<H>    Ca    8.0<L>      19 Dec 2022 00:34  Phos  3.7     12-19  Mg     2.9     12-19    TPro  7.0  /  Alb  3.4  /  TBili  0.9  /  DBili  x   /  AST  62<H>  /  ALT  22  /  AlkPhos  114  12-19    LIVER FUNCTIONS - ( 19 Dec 2022 00:34 )  Alb: 3.4 g/dL / Pro: 7.0 g/dL / ALK PHOS: 114 U/L / ALT: 22 U/L / AST: 62 U/L / GGT: x           PTT - ( 18 Dec 2022 02:21 )  PTT:27.2 sec  ABG - ( 19 Dec 2022 05:55 )  pH, Arterial: 7.43  pH, Blood: x     /  pCO2: 33    /  pO2: 76    / HCO3: 22    / Base Excess: -1.9  /  SaO2: 97.7    ------------------------------------------------------------------------------------------------------------------------------  Assessment:  61 y/o male with PMH of CABG, DM, HTN, HLD presenting as transfer from [place] for c/f STEMI. PTA arrival received 325 aspirin, 600 plavix, and no heparin drip started. Around 1:30 am patient had multiple episodes of non-bloody diarrhea and emesis with associated abdominal pain and chest pain, unable to localize, non-radiating, with associated SOB and no associated palpitations or diaphoresis. No recent fevers/chills, cough, rashes, or changes in urination. Does report mild diffuse back pain; started 5 days ago in setting on injury while walking and lifting objects. Denies focal weakness, numbness/tingling, or LOC due does report mild dizziness.    Cardiogenic shock s/p VA ECMO decannulated 12/8  Mitral regurgitation s/p Mitral clip x1 12/16/22  Acute respiratory distress/failure s/p Tracheostomy 12/16/22   IABP placed ( IABP dc'ed 12/17)   At high risk for hemodynamic instability  ERIC  Leukocytosis   Thrombocytopenia  Anemia  Acute pancreatitis    Plan:   ***Neuro***  CT head showed 4mm subdural hematoma 11/26: repeat CT head unchanged 12/01  Soft palate laceration, ENT scoped 12/8, stable, no acute intervention at this time  Repeat Head CT 12/13 Similar minimal increased density along the right aspect of the posterior   falx and right tentorial leaf, consistent with minimal residual subdural   hemorrhage.  No parenchymal hemorrhage or brain edema.  Acute pain control with Tylenol    ***Cardiovascular***  At high risk for hemodynamic instability and cardiac arrhythmias.  Off inotropes and pressors currently.  AC Therapy with Heparin gtt pAF/flutter   Amiodarone for rate control - transitioned to PO  ASA /Statin daily    ***Pulmonary***  Respiratory failure s/p Tracheostomy - continue TC trials  Wean ventilator as tolerated.   Deep breathing and coughing exercises.  Wean oxygen as able.    ***GI***  Protein calorie malnutrition - Tube feeds via nasal feeding tube  Pancreatitis   Protonix for stress ulcer prophylaxis     ***Renal***  ERIC, renal following, continue CRRT  Volume removal as tolerated.  Replete electrolytes as indicated.    ***ID***  Leukocytosis - off antibiotics currently.  Appreciate ID input    ***Endocrine***  Hx of DM2, continue glycemic control w/ ISS.        Patient requires continuous monitoring with bedside rhythm monitoring, pulse oximetry monitoring, and continuous central venous and arterial pressure monitoring; and intermittent blood gas analysis. Care plan discussed with the ICU care team.   Patient remained critical, at risk for life threatening decompensation.    I have spent 38 minutes providing critical care management to this patient.    By signing my name below, I, Rosio Alonzo, attest that this documentation has been prepared under the direction and in the presence of Jade Rosas MD  Electronically signed: Brian Burnett, 12-19-22 @ 08:24    I, Jade Rosas MD, personally performed the services described in this documentation. all medical record entries made by the scribe were at my direction and in my presence. I have reviewed the chart and agree that the record reflects my personal performance and is accurate and complete  Electronically signed: Jade Rosas MD

## 2022-12-19 NOTE — PROGRESS NOTE ADULT - SUBJECTIVE AND OBJECTIVE BOX
infectious diseases progress note:    Patient is a 62y old  Male who presents with a chief complaint of Acute cholecystitis, gallstone pancreatitis (19 Dec 2022 09:37)        Acute pancreatitis without infection or necrosis             Allergies    No Known Allergies    Intolerances        ANTIBIOTICS/RELEVANT:  antimicrobials    immunologic:    OTHER:  acetaminophen    Suspension .. 650 milliGRAM(s) Oral every 6 hours PRN  aMIOdarone    Tablet 200 milliGRAM(s) Oral daily  aMIOdarone Infusion 0.5 mG/Min IV Continuous <Continuous>  aspirin  chewable 81 milliGRAM(s) Oral daily  atorvastatin 40 milliGRAM(s) Oral at bedtime  chlorhexidine 0.12% Liquid 15 milliLiter(s) Oral Mucosa every 12 hours  CRRT Treatment    <Continuous>  heparin   Injectable 5000 Unit(s) SubCutaneous every 8 hours  heparin  Infusion Syringe 300 Unit(s)/Hr CRRT <Continuous>  insulin lispro (ADMELOG) corrective regimen sliding scale   SubCutaneous every 6 hours  multivitamin/minerals/iron Oral Solution (CENTRUM) 15 milliLiter(s) Oral daily  pantoprazole  Injectable 40 milliGRAM(s) IV Push daily  Phoxillum Filtration BK 4 / 2.5 5000 milliLiter(s) CRRT <Continuous>  Phoxillum Filtration BK 4 / 2.5 5000 milliLiter(s) CRRT <Continuous>  PrismaSOL Filtration BGK 4 / 2.5 5000 milliLiter(s) CRRT <Continuous>  thiamine 100 milliGRAM(s) Enteral Tube daily      Objective:  Vital Signs Last 24 Hrs  T(C): 37.4 (19 Dec 2022 04:00), Max: 37.5 (19 Dec 2022 00:00)  T(F): 99.3 (19 Dec 2022 04:00), Max: 99.5 (19 Dec 2022 00:00)  HR: 88 (19 Dec 2022 08:19) (88 - 104)  BP: --  BP(mean): --  RR: 25 (19 Dec 2022 08:19) (12 - 37)  SpO2: 100% (19 Dec 2022 08:19) (96% - 100%)    Parameters below as of 19 Dec 2022 08:19  Patient On (Oxygen Delivery Method): tracheostomy collar    O2 Concentrat   Eyes:INOCENCIO, EOMI  Ear/Nose/Throat: no oral lesion, no sinus tenderness on percussion	  Neck:no JVD, no lymphadenopathy, supple  Respiratory: CTA lore  Cardiovascular: S1S2 RRR, no murmurs  Gastrointestinal:soft, (+) BS, no HSM  Extremities:no e/e/c        LABS:                        9.7    13.15 )-----------( 78       ( 19 Dec 2022 00:35 )             30.4     12-19    134<L>  |  101  |  20  ----------------------------<  120<H>  4.4   |  22  |  1.40<H>    Ca    8.0<L>      19 Dec 2022 00:34  Phos  3.7     12-19  Mg     2.9     12-19    TPro  7.0  /  Alb  3.4  /  TBili  0.9  /  DBili  x   /  AST  62<H>  /  ALT  22  /  AlkPhos  114  12-19    PTT - ( 18 Dec 2022 02:21 )  PTT:27.2 sec        MICROBIOLOGY:    RECENT CULTURES:  12-19 @ 02:34 Trach Asp Tracheal Aspirate       Few polymorphonuclear leukocytes per low power field  Rare Squamous epithelial cells per low power field  Few Yeast like cells per oil power field  Rare Gram Positive Rods per oil power field             12-15 @ 17:46 .Sputum Sputum       Few polymorphonuclear leukocytes per low power field  Rare Squamous epithelial cells per low power field  Rare Yeast per oil power field           Normal Respiratory Christy present    12-15 @ 17:35 .Blood Blood-Peripheral                No growth to date.    12-15 @ 15:56 .Blood Blood-Peripheral                No growth to date.          RESPIRATORY CULTURES:              RADIOLOGY & ADDITIONAL STUDIES:        Pager [phone]  After 5 pm/weekends or if no response :[phone] infectious diseases progress note:    Patient is a 62y old  Male who presents with a chief complaint of Acute cholecystitis, gallstone pancreatitis (19 Dec 2022 09:37)        Acute pancreatitis without infection or necrosis             Allergies    No Known Allergies    Intolerances        ANTIBIOTICS/RELEVANT:  antimicrobials    immunologic:    OTHER:  acetaminophen    Suspension .. 650 milliGRAM(s) Oral every 6 hours PRN  aMIOdarone    Tablet 200 milliGRAM(s) Oral daily  aMIOdarone Infusion 0.5 mG/Min IV Continuous <Continuous>  aspirin  chewable 81 milliGRAM(s) Oral daily  atorvastatin 40 milliGRAM(s) Oral at bedtime  chlorhexidine 0.12% Liquid 15 milliLiter(s) Oral Mucosa every 12 hours  CRRT Treatment    <Continuous>  heparin   Injectable 5000 Unit(s) SubCutaneous every 8 hours  heparin  Infusion Syringe 300 Unit(s)/Hr CRRT <Continuous>  insulin lispro (ADMELOG) corrective regimen sliding scale   SubCutaneous every 6 hours  multivitamin/minerals/iron Oral Solution (CENTRUM) 15 milliLiter(s) Oral daily  pantoprazole  Injectable 40 milliGRAM(s) IV Push daily  Phoxillum Filtration BK 4 / 2.5 5000 milliLiter(s) CRRT <Continuous>  Phoxillum Filtration BK 4 / 2.5 5000 milliLiter(s) CRRT <Continuous>  PrismaSOL Filtration BGK 4 / 2.5 5000 milliLiter(s) CRRT <Continuous>  thiamine 100 milliGRAM(s) Enteral Tube daily      Objective:  Vital Signs Last 24 Hrs  T(C): 37.4 (19 Dec 2022 04:00), Max: 37.5 (19 Dec 2022 00:00)  T(F): 99.3 (19 Dec 2022 04:00), Max: 99.5 (19 Dec 2022 00:00)  HR: 88 (19 Dec 2022 08:19) (88 - 104)  BP: --  BP(mean): --  RR: 25 (19 Dec 2022 08:19) (12 - 37)  SpO2: 100% (19 Dec 2022 08:19) (96% - 100%)    Parameters below as of 19 Dec 2022 08:19  Patient On (Oxygen Delivery Method): tracheostomy collar    O2 Concentrat   Eyes:INOCENCIO, EOMI  Ear/Nose/Throat: no oral lesion, no sinus tenderness on percussion	  Neck:no JVD, no lymphadenopathy, supple  Respiratory: CTA lore  Cardiovascular: S1S2 RRR, no murmurs  Gastrointestinal:soft, (+) BS, no HSM  Extremities:no e/e/c        LABS:                        9.7    13.15 )-----------( 78       ( 19 Dec 2022 00:35 )             30.4     12-19    134<L>  |  101  |  20  ----------------------------<  120<H>  4.4   |  22  |  1.40<H>    Ca    8.0<L>      19 Dec 2022 00:34  Phos  3.7     12-19  Mg     2.9     12-19    TPro  7.0  /  Alb  3.4  /  TBili  0.9  /  DBili  x   /  AST  62<H>  /  ALT  22  /  AlkPhos  114  12-19    PTT - ( 18 Dec 2022 02:21 )  PTT:27.2 sec        MICROBIOLOGY:    RECENT CULTURES:  12-19 @ 02:34 Trach Asp Tracheal Aspirate       Few polymorphonuclear leukocytes per low power field  Rare Squamous epithelial cells per low power field  Few Yeast like cells per oil power field  Rare Gram Positive Rods per oil power field             12-15 @ 17:46 .Sputum Sputum       Few polymorphonuclear leukocytes per low power field  Rare Squamous epithelial cells per low power field  Rare Yeast per oil power field           Normal Respiratory Christy present    12-15 @ 17:35 .Blood Blood-Peripheral                No growth to date.    12-15 @ 15:56 .Blood Blood-Peripheral                No growth to date.          RESPIRATORY CULTURES:              RADIOLOGY & ADDITIONAL STUDIES:        Pager [phone]  After 5 pm/weekends or if no response :[phone] infectious diseases progress note:    Patient is a 62y old  Male who presents with a chief complaint of Acute cholecystitis, gallstone pancreatitis (19 Dec 2022 09:37)        Acute pancreatitis without infection or necrosis             Allergies    No Known Allergies    Intolerances        ANTIBIOTICS/RELEVANT:  antimicrobials    immunologic:    OTHER:  acetaminophen    Suspension .. 650 milliGRAM(s) Oral every 6 hours PRN  aMIOdarone    Tablet 200 milliGRAM(s) Oral daily  aMIOdarone Infusion 0.5 mG/Min IV Continuous <Continuous>  aspirin  chewable 81 milliGRAM(s) Oral daily  atorvastatin 40 milliGRAM(s) Oral at bedtime  chlorhexidine 0.12% Liquid 15 milliLiter(s) Oral Mucosa every 12 hours  CRRT Treatment    <Continuous>  heparin   Injectable 5000 Unit(s) SubCutaneous every 8 hours  heparin  Infusion Syringe 300 Unit(s)/Hr CRRT <Continuous>  insulin lispro (ADMELOG) corrective regimen sliding scale   SubCutaneous every 6 hours  multivitamin/minerals/iron Oral Solution (CENTRUM) 15 milliLiter(s) Oral daily  pantoprazole  Injectable 40 milliGRAM(s) IV Push daily  Phoxillum Filtration BK 4 / 2.5 5000 milliLiter(s) CRRT <Continuous>  Phoxillum Filtration BK 4 / 2.5 5000 milliLiter(s) CRRT <Continuous>  PrismaSOL Filtration BGK 4 / 2.5 5000 milliLiter(s) CRRT <Continuous>  thiamine 100 milliGRAM(s) Enteral Tube daily      Objective:  Vital Signs Last 24 Hrs  T(C): 37.4 (19 Dec 2022 04:00), Max: 37.5 (19 Dec 2022 00:00)  T(F): 99.3 (19 Dec 2022 04:00), Max: 99.5 (19 Dec 2022 00:00)  HR: 88 (19 Dec 2022 08:19) (88 - 104)  BP: --  BP(mean): --  RR: 25 (19 Dec 2022 08:19) (12 - 37)  SpO2: 100% (19 Dec 2022 08:19) (96% - 100%)    Parameters below as of 19 Dec 2022 08:19  Patient On (Oxygen Delivery Method): tracheostomy collar    O2 Concentrat   Eyes:INOCENCIO, EOMI  Ear/Nose/Throat: no oral lesion, no sinus tenderness on percussion	  Neck:no JVD, no lymphadenopathy, supple  Respiratory: CTA lore  Cardiovascular: S1S2 RRR, no murmurs  Gastrointestinal:soft, (+) BS, no HSM  Extremities:no e/e/c        LABS:                        9.7    13.15 )-----------( 78       ( 19 Dec 2022 00:35 )             30.4     12-19    134<L>  |  101  |  20  ----------------------------<  120<H>  4.4   |  22  |  1.40<H>    Ca    8.0<L>      19 Dec 2022 00:34  Phos  3.7     12-19  Mg     2.9     12-19    TPro  7.0  /  Alb  3.4  /  TBili  0.9  /  DBili  x   /  AST  62<H>  /  ALT  22  /  AlkPhos  114  12-19    PTT - ( 18 Dec 2022 02:21 )  PTT:27.2 sec        MICROBIOLOGY:    RECENT CULTURES:  12-19 @ 02:34 Trach Asp Tracheal Aspirate       Few polymorphonuclear leukocytes per low power field  Rare Squamous epithelial cells per low power field  Few Yeast like cells per oil power field  Rare Gram Positive Rods per oil power field             12-15 @ 17:46 .Sputum Sputum       Few polymorphonuclear leukocytes per low power field  Rare Squamous epithelial cells per low power field  Rare Yeast per oil power field           Normal Respiratory Christy present    12-15 @ 17:35 .Blood Blood-Peripheral                No growth to date.    12-15 @ 15:56 .Blood Blood-Peripheral                No growth to date.          RESPIRATORY CULTURES:              RADIOLOGY & ADDITIONAL STUDIES:        Pager [phone]  After 5 pm/weekends or if no response :[phone]

## 2022-12-19 NOTE — PROGRESS NOTE ADULT - ATTENDING COMMENTS
ERIC/ATN  Requiring CRRT  Net negative fluid balance, edema  Dose meds for CRRT  MAP >65  Remainder per fellow, will follow

## 2022-12-19 NOTE — PROGRESS NOTE ADULT - PROBLEM SELECTOR PLAN 1
Pt with non oliguric ERIC/ ATN in the setting of STEMI, cardiac arrest & cardiogenic shock  SCr on arrival was 2.15; trended down to hector 1.6 on 11/25;  slowly trended up & increased to 3.95 11/28.   Pt received IV diuretics. SCr increased to 4.19 with BUN of 101 on 12/5/22. Pt with significant volume overload. Pt initiated on CRRT/CVVHDF to optimize volume and electrolytes on 12/5/22. BP being maintained on vasopressors. Pt likely with ATN. Pt underwent decannulation of ECMO on 12/8/22 and was taken off CRRT. Pt reinitiated on CRRT overnight on 12/9/22 for volume overload. Labs reviewed. s/p trach on 12/16 with FiO2 50%. CRRT without any issue, being kept net negative. Remains on milrinone gtt. Pt remains anuric. On IV vasopressors. Plan is to continue CRRT with net negative. Monitor labs and urine output. Avoid any potential nephrotoxins. Dose medications as per CRRT.    If you have any questions, please feel free to contact me  Arsalan Cochran  Nephrology Fellow  [phone]; Prefer [employer] TEAMS  (After 5pm or on weekends please page the on-call fellow) Pt with non oliguric ERIC/ ATN in the setting of STEMI, cardiac arrest & cardiogenic shock  SCr on arrival was 2.15; trended down to hector 1.6 on 11/25;  slowly trended up & increased to 3.95 11/28.   Pt received IV diuretics. SCr increased to 4.19 with BUN of 101 on 12/5/22. Pt with significant volume overload. Pt initiated on CRRT/CVVHDF to optimize volume and electrolytes on 12/5/22. BP being maintained on vasopressors. Pt likely with ATN. Pt underwent decannulation of ECMO on 12/8/22 and was taken off CRRT. Pt reinitiated on CRRT overnight on 12/9/22 for volume overload. Labs reviewed. s/p trach on 12/16 with FiO2 50%. CRRT without any issue, being kept net negative. Remains on milrinone gtt. Pt remains anuric. On IV vasopressors. Plan is to continue CRRT with net negative. Monitor labs and urine output. Avoid any potential nephrotoxins. Dose medications as per CRRT.    If you have any questions, please feel free to contact me  Arsalan Cochran  Nephrology Fellow  [phone]; Prefer [employer] TEAMS  (After 5pm or on weekends please page the on-call fellow) Pt with non oliguric ERIC/ ATN in the setting of STEMI, cardiac arrest & cardiogenic shock  SCr on arrival was 2.15; trended down to hector 1.6 on 11/25;  slowly trended up & increased to 3.95 11/28.   Pt received IV diuretics. SCr increased to 4.19 with BUN of 101 on 12/5/22. Pt with significant volume overload. Pt initiated on CRRT/CVVHDF to optimize volume and electrolytes on 12/5/22. BP being maintained on vasopressors. Pt likely with ATN. Pt underwent decannulation of ECMO on 12/8/22 and was taken off CRRT. Pt reinitiated on CRRT overnight on 12/9/22 for volume overload. Labs reviewed. s/p trach on 12/16 with FiO2 50%. CRRT without any issue, being kept net negative. Remains on milrinone gtt. Pt remains anuric. On IV vasopressors. Plan is to continue CRRT with net negative. Monitor labs and urine output. Avoid any potential nephrotoxins. Dose medications as per CRRT.    If you have any questions, please feel free to contact me  Arsalan Cochran  Nephrology Fellow  [phone]; Prefer [employer] TEAMS  (After 5pm or on weekends please page the on-call fellow)

## 2022-12-19 NOTE — PHYSICAL THERAPY INITIAL EVALUATION ADULT - IMPAIRMENTS FOUND, PT EVAL
aerobic capacity/endurance/gait, locomotion, and balance
aerobic capacity/endurance/arousal, attention, and cognition/circulation/gait, locomotion, and balance/integumentary integrity

## 2022-12-19 NOTE — PROGRESS NOTE ADULT - SUBJECTIVE AND OBJECTIVE BOX
*****Structural Heart Team*****    Subjective:    The patient is resting in a chair in the ICU, on CVVH and feeling weak. He had a tracheostomy placed post Mitral MONROE, and is currently on Trach collar.    PAST MEDICAL & SURGICAL HISTORY:        T(C): 37.4 (12-19-22 @ 04:00), Max: 37.5 (12-19-22 @ 00:00)  HR: 88 (12-19-22 @ 08:19) (88 - 104)  BP: --  RR: 25 (12-19-22 @ 08:19) (12 - 37)  SpO2: 100% (12-19-22 @ 08:19) (96% - 100%)  Wt(kg): --  12-18 @ 07:01  -  12-19 @ 07:00  --------------------------------------------------------  IN: 2435.3 mL / OUT: 3399 mL / NET: -963.7 mL    12-19 @ 07:01  -  12-19 @ 09:49  --------------------------------------------------------  IN: 260.1 mL / OUT: 169 mL / NET: 91.1 mL      MEDICATIONS  (STANDING):  aMIOdarone    Tablet 200 milliGRAM(s) Oral daily  aMIOdarone Infusion 0.5 mG/Min (16.7 mL/Hr) IV Continuous <Continuous>  aspirin  chewable 81 milliGRAM(s) Oral daily  atorvastatin 40 milliGRAM(s) Oral at bedtime  chlorhexidine 0.12% Liquid 15 milliLiter(s) Oral Mucosa every 12 hours  CRRT Treatment    <Continuous>  heparin   Injectable 5000 Unit(s) SubCutaneous every 8 hours  heparin  Infusion Syringe 300 Unit(s)/Hr (0.6 mL/Hr) CRRT <Continuous>  insulin lispro (ADMELOG) corrective regimen sliding scale   SubCutaneous every 6 hours  multivitamin/minerals/iron Oral Solution (CENTRUM) 15 milliLiter(s) Oral daily  pantoprazole  Injectable 40 milliGRAM(s) IV Push daily  Phoxillum Filtration BK 4 / 2.5 5000 milliLiter(s) (2400 mL/Hr) CRRT <Continuous>  Phoxillum Filtration BK 4 / 2.5 5000 milliLiter(s) (800 mL/Hr) CRRT <Continuous>  PrismaSOL Filtration BGK 4 / 2.5 5000 milliLiter(s) (200 mL/Hr) CRRT <Continuous>  thiamine 100 milliGRAM(s) Enteral Tube daily    MEDICATIONS  (PRN):  acetaminophen    Suspension .. 650 milliGRAM(s) Oral every 6 hours PRN Temp greater or equal to 38.5C (101.3F)      Review of Symptoms:  Constitutional: Awake, Alert, Follows commands  Respiratory:  Mild SOB  Cardiac: Denies CP, Denies Palpitations  Gastrointestinal: Denies Pain, Denies N/V, tolerating po intake  Vascular: Negative  Extremities: + Edema, No joint pain or swelling  Neurological: Negative  Endocrine: No heat or cold intolerance, No excessive thirst  Heme/Onc: Negative    Exam:  General: Alert, follows commands  HEENT: Supple, No JVD, Trachea midline, no masses  Pulmonary: Breath sounds coarse B/L, = Chest Excursion, no accessory muscle use  Cor: S1S2, RRR, I/VI KRISHNA noted, No rub  ECG: SR  Groin/Wound: Groin: Soft, NT, no hematoma, mild ecchymosis  Gastrointestinal: Soft, NT/ND, + Bowel Sounds, + Feeding tube  Neuro: = motor and sensory B/L, No focal deficits  Vascular: 1+ Pulses B/L, 1+ Edema  Extremities: No joint pain or swelling  Skin: Warm/Dry/Normal color, Normal turgor, no rashes                          9.7    13.15 )-----------( 78       ( 19 Dec 2022 00:35 )             30.4   12-19    134<L>  |  101  |  20  ----------------------------<  120<H>  4.4   |  22  |  1.40<H>    Ca    8.0<L>      19 Dec 2022 00:34  Phos  3.7     12-19  Mg     2.9     12-19    TPro  7.0  /  Alb  3.4  /  TBili  0.9  /  DBili  x   /  AST  62<H>  /  ALT  22  /  AlkPhos  114  12-19  PTT - ( 18 Dec 2022 02:21 )  PTT:27.2 sec    Imaging Reviewed:    Echocardiogram:  < from: Intra-Operative Transesophageal Echo (12.16.22 @ 00:00) >  Post-Bypass Observations:    A trans-septal puncture was performed usingTEE guidance  with the puncture site 4.2cm above the mitral valve  annulus.  The dilator followed by the guiding catheter was  positioned with the tip across the interatrial septum.   A  MitraClip Gen 4 XTW was introduced into the left atrium and  positioned over the mitral valve leaflets.  2D and 3D  guidance was used to position the opened clip orthogonal to  the commissural plane.  The anterior and posterior grippers  were tested for identification and function.  The device  was advanced across the mitral annulus and re-positioned.  The anterior and posterior leaflets were ultimately grasped  just lateral to the center on A2/P2 where the posterior  leaflet tissue was the most optimal for grasping (the more  medial P2 scallop tissue was inadequate).  2-D and biplane  imaging were used to ensure both leaflets were within the  clip with good leaflet insertion, the grippers were lowered  and the clip was closed under live MPR imaging and biplane  interrogation.  There was a good tissue bridge seen.  There  was moderate(2+) mitral regurgitation which was a reduction  with a larger jet medial to the clip as anticipated and a  small jet lateral to the clip.  The peak/mean gradients=  8/4mmHg (HR= 103bpm).  The clip was deployed and appeared  stable.  Imaging afterwards showed unchanged  moderate(2+)mitral regurgitation with jets on either side  of the clip.  The total average 3D VCA= 27mm2) with a  regurgitant volume= 27ml/beat, (MR VTI= 101cm, MR Vmax=  4.2m/s).  There was still systolic blunting but no systolic  flow reversal in the pulmonary veins.  The final peak/mean  transmitral gradients= 8/3mmHg (HR= 102bpm).  The final  planimetered MVA= 3.6cm2 (1.46cm2+2.2cm2).  The mandril was  removed under echo guidance.  Withdrawal of the catheters  and guide were monitored continuously; there was a small  residual atrial septal defect seen by color Doppler with  left-to-right shunt.   There was mild tricuspid  regurgitation.  The LVEF was grossly unchanged (LVT VTI=  13.5cm). The right ventricular size and function were  unchanged.  There was no pericardial effusion.  Images were reviewed with the interventional/surgical team  throughout the study.  ------------------------------------------------------------------------  Conclusions:  The patient was on IABP 1:1 throughout the procedure.  Confirmed on  12/16/2022 - 18:09:51 by Merari Lundberg M.D.    < end of copied text >      Cardiac Catheterization:        Assesment/Plan:    61 Y/O male S/P Mitral MONROE, S/P Cardiac Arrest secondary to arrythmia, Acute on chronic Renal injury  1.) S/P Emergent Mitral MONROE: ASA 81 mg po daily, Continue to Monitor Groins. Ambulate as tolerated when stable. Will need repeat TTE prior to discharge when stable.  2.) Renal Injury: Continue with CVVH as directed by Renal team  3.) Arrythmia: Continue with Amiodarone load  4.) Hypoxic Respiratory failure: Continue with ATC as tolerated  5.) GI/DVT prophylaxis  6.) Continue with feeds via Jeb Feed tube    BROOKE Michel  [id number] *****Structural Heart Team*****    Subjective:    The patient is resting in a chair in the ICU, on CVVH and feeling weak. He had a tracheostomy placed post Mitral MONROE, and is currently on Trach collar.    PAST MEDICAL & SURGICAL HISTORY:        T(C): 37.4 (12-19-22 @ 04:00), Max: 37.5 (12-19-22 @ 00:00)  HR: 88 (12-19-22 @ 08:19) (88 - 104)  BP: --  RR: 25 (12-19-22 @ 08:19) (12 - 37)  SpO2: 100% (12-19-22 @ 08:19) (96% - 100%)  Wt(kg): --  12-18 @ 07:01  -  12-19 @ 07:00  --------------------------------------------------------  IN: 2435.3 mL / OUT: 3399 mL / NET: -963.7 mL    12-19 @ 07:01  -  12-19 @ 09:49  --------------------------------------------------------  IN: 260.1 mL / OUT: 169 mL / NET: 91.1 mL      MEDICATIONS  (STANDING):  aMIOdarone    Tablet 200 milliGRAM(s) Oral daily  aMIOdarone Infusion 0.5 mG/Min (16.7 mL/Hr) IV Continuous <Continuous>  aspirin  chewable 81 milliGRAM(s) Oral daily  atorvastatin 40 milliGRAM(s) Oral at bedtime  chlorhexidine 0.12% Liquid 15 milliLiter(s) Oral Mucosa every 12 hours  CRRT Treatment    <Continuous>  heparin   Injectable 5000 Unit(s) SubCutaneous every 8 hours  heparin  Infusion Syringe 300 Unit(s)/Hr (0.6 mL/Hr) CRRT <Continuous>  insulin lispro (ADMELOG) corrective regimen sliding scale   SubCutaneous every 6 hours  multivitamin/minerals/iron Oral Solution (CENTRUM) 15 milliLiter(s) Oral daily  pantoprazole  Injectable 40 milliGRAM(s) IV Push daily  Phoxillum Filtration BK 4 / 2.5 5000 milliLiter(s) (2400 mL/Hr) CRRT <Continuous>  Phoxillum Filtration BK 4 / 2.5 5000 milliLiter(s) (800 mL/Hr) CRRT <Continuous>  PrismaSOL Filtration BGK 4 / 2.5 5000 milliLiter(s) (200 mL/Hr) CRRT <Continuous>  thiamine 100 milliGRAM(s) Enteral Tube daily    MEDICATIONS  (PRN):  acetaminophen    Suspension .. 650 milliGRAM(s) Oral every 6 hours PRN Temp greater or equal to 38.5C (101.3F)      Review of Symptoms:  Constitutional: Awake, Alert, Follows commands  Respiratory:  Mild SOB  Cardiac: Denies CP, Denies Palpitations  Gastrointestinal: Denies Pain, Denies N/V, tolerating po intake  Vascular: Negative  Extremities: + Edema, No joint pain or swelling  Neurological: Negative  Endocrine: No heat or cold intolerance, No excessive thirst  Heme/Onc: Negative    Exam:  General: Alert, follows commands  HEENT: Supple, No JVD, Trachea midline, no masses  Pulmonary: Breath sounds coarse B/L, = Chest Excursion, no accessory muscle use  Cor: S1S2, RRR, I/VI KRISHNA noted, No rub  ECG: SR  Groin/Wound: Groin: Soft, NT, no hematoma, mild ecchymosis  Gastrointestinal: Soft, NT/ND, + Bowel Sounds, + Feeding tube  Neuro: = motor and sensory B/L, No focal deficits  Vascular: 1+ Pulses B/L, 1+ Edema  Extremities: No joint pain or swelling  Skin: Warm/Dry/Normal color, Normal turgor, no rashes                          9.7    13.15 )-----------( 78       ( 19 Dec 2022 00:35 )             30.4   12-19    134<L>  |  101  |  20  ----------------------------<  120<H>  4.4   |  22  |  1.40<H>    Ca    8.0<L>      19 Dec 2022 00:34  Phos  3.7     12-19  Mg     2.9     12-19    TPro  7.0  /  Alb  3.4  /  TBili  0.9  /  DBili  x   /  AST  62<H>  /  ALT  22  /  AlkPhos  114  12-19  PTT - ( 18 Dec 2022 02:21 )  PTT:27.2 sec    Imaging Reviewed:    Echocardiogram:  < from: Intra-Operative Transesophageal Echo (12.16.22 @ 00:00) >  Post-Bypass Observations:    A trans-septal puncture was performed usingTEE guidance  with the puncture site 4.2cm above the mitral valve  annulus.  The dilator followed by the guiding catheter was  positioned with the tip across the interatrial septum.   A  MitraClip Gen 4 XTW was introduced into the left atrium and  positioned over the mitral valve leaflets.  2D and 3D  guidance was used to position the opened clip orthogonal to  the commissural plane.  The anterior and posterior grippers  were tested for identification and function.  The device  was advanced across the mitral annulus and re-positioned.  The anterior and posterior leaflets were ultimately grasped  just lateral to the center on A2/P2 where the posterior  leaflet tissue was the most optimal for grasping (the more  medial P2 scallop tissue was inadequate).  2-D and biplane  imaging were used to ensure both leaflets were within the  clip with good leaflet insertion, the grippers were lowered  and the clip was closed under live MPR imaging and biplane  interrogation.  There was a good tissue bridge seen.  There  was moderate(2+) mitral regurgitation which was a reduction  with a larger jet medial to the clip as anticipated and a  small jet lateral to the clip.  The peak/mean gradients=  8/4mmHg (HR= 103bpm).  The clip was deployed and appeared  stable.  Imaging afterwards showed unchanged  moderate(2+)mitral regurgitation with jets on either side  of the clip.  The total average 3D VCA= 27mm2) with a  regurgitant volume= 27ml/beat, (MR VTI= 101cm, MR Vmax=  4.2m/s).  There was still systolic blunting but no systolic  flow reversal in the pulmonary veins.  The final peak/mean  transmitral gradients= 8/3mmHg (HR= 102bpm).  The final  planimetered MVA= 3.6cm2 (1.46cm2+2.2cm2).  The mandril was  removed under echo guidance.  Withdrawal of the catheters  and guide were monitored continuously; there was a small  residual atrial septal defect seen by color Doppler with  left-to-right shunt.   There was mild tricuspid  regurgitation.  The LVEF was grossly unchanged (LVT VTI=  13.5cm). The right ventricular size and function were  unchanged.  There was no pericardial effusion.  Images were reviewed with the interventional/surgical team  throughout the study.  ------------------------------------------------------------------------  Conclusions:  The patient was on IABP 1:1 throughout the procedure.  Confirmed on  12/16/2022 - 18:09:51 by Merari Lundberg M.D.    < end of copied text >      Cardiac Catheterization:        Assesment/Plan:    63 Y/O male S/P Mitral MONROE, S/P Cardiac Arrest secondary to arrythmia, Acute on chronic Renal injury  1.) S/P Emergent Mitral MONROE: ASA 81 mg po daily, Continue to Monitor Groins. Ambulate as tolerated when stable. Will need repeat TTE prior to discharge when stable.  2.) Renal Injury: Continue with CVVH as directed by Renal team  3.) Arrythmia: Continue with Amiodarone load  4.) Hypoxic Respiratory failure: Continue with ATC as tolerated  5.) GI/DVT prophylaxis  6.) Continue with feeds via Jeb Feed tube    BROOKE Michel  [phone] *****Structural Heart Team*****    Subjective:    The patient is resting in a chair in the ICU, on CVVH and feeling weak. He had a tracheostomy placed post Mitral MONROE, and is currently on Trach collar.    PAST MEDICAL & SURGICAL HISTORY:        T(C): 37.4 (12-19-22 @ 04:00), Max: 37.5 (12-19-22 @ 00:00)  HR: 88 (12-19-22 @ 08:19) (88 - 104)  BP: --  RR: 25 (12-19-22 @ 08:19) (12 - 37)  SpO2: 100% (12-19-22 @ 08:19) (96% - 100%)  Wt(kg): --  12-18 @ 07:01  -  12-19 @ 07:00  --------------------------------------------------------  IN: 2435.3 mL / OUT: 3399 mL / NET: -963.7 mL    12-19 @ 07:01  -  12-19 @ 09:49  --------------------------------------------------------  IN: 260.1 mL / OUT: 169 mL / NET: 91.1 mL      MEDICATIONS  (STANDING):  aMIOdarone    Tablet 200 milliGRAM(s) Oral daily  aMIOdarone Infusion 0.5 mG/Min (16.7 mL/Hr) IV Continuous <Continuous>  aspirin  chewable 81 milliGRAM(s) Oral daily  atorvastatin 40 milliGRAM(s) Oral at bedtime  chlorhexidine 0.12% Liquid 15 milliLiter(s) Oral Mucosa every 12 hours  CRRT Treatment    <Continuous>  heparin   Injectable 5000 Unit(s) SubCutaneous every 8 hours  heparin  Infusion Syringe 300 Unit(s)/Hr (0.6 mL/Hr) CRRT <Continuous>  insulin lispro (ADMELOG) corrective regimen sliding scale   SubCutaneous every 6 hours  multivitamin/minerals/iron Oral Solution (CENTRUM) 15 milliLiter(s) Oral daily  pantoprazole  Injectable 40 milliGRAM(s) IV Push daily  Phoxillum Filtration BK 4 / 2.5 5000 milliLiter(s) (2400 mL/Hr) CRRT <Continuous>  Phoxillum Filtration BK 4 / 2.5 5000 milliLiter(s) (800 mL/Hr) CRRT <Continuous>  PrismaSOL Filtration BGK 4 / 2.5 5000 milliLiter(s) (200 mL/Hr) CRRT <Continuous>  thiamine 100 milliGRAM(s) Enteral Tube daily    MEDICATIONS  (PRN):  acetaminophen    Suspension .. 650 milliGRAM(s) Oral every 6 hours PRN Temp greater or equal to 38.5C (101.3F)      Review of Symptoms:  Constitutional: Awake, Alert, Follows commands  Respiratory:  Mild SOB  Cardiac: Denies CP, Denies Palpitations  Gastrointestinal: Denies Pain, Denies N/V, tolerating po intake  Vascular: Negative  Extremities: + Edema, No joint pain or swelling  Neurological: Negative  Endocrine: No heat or cold intolerance, No excessive thirst  Heme/Onc: Negative    Exam:  General: Alert, follows commands  HEENT: Supple, No JVD, Trachea midline, no masses  Pulmonary: Breath sounds coarse B/L, = Chest Excursion, no accessory muscle use  Cor: S1S2, RRR, I/VI KRISHNA noted, No rub  ECG: SR  Groin/Wound: Groin: Soft, NT, no hematoma, mild ecchymosis  Gastrointestinal: Soft, NT/ND, + Bowel Sounds, + Feeding tube  Neuro: = motor and sensory B/L, No focal deficits  Vascular: 1+ Pulses B/L, 1+ Edema  Extremities: No joint pain or swelling  Skin: Warm/Dry/Normal color, Normal turgor, no rashes                          9.7    13.15 )-----------( 78       ( 19 Dec 2022 00:35 )             30.4   12-19    134<L>  |  101  |  20  ----------------------------<  120<H>  4.4   |  22  |  1.40<H>    Ca    8.0<L>      19 Dec 2022 00:34  Phos  3.7     12-19  Mg     2.9     12-19    TPro  7.0  /  Alb  3.4  /  TBili  0.9  /  DBili  x   /  AST  62<H>  /  ALT  22  /  AlkPhos  114  12-19  PTT - ( 18 Dec 2022 02:21 )  PTT:27.2 sec    Imaging Reviewed:    Echocardiogram:  < from: Intra-Operative Transesophageal Echo (12.16.22 @ 00:00) >  Post-Bypass Observations:    A trans-septal puncture was performed usingTEE guidance  with the puncture site 4.2cm above the mitral valve  annulus.  The dilator followed by the guiding catheter was  positioned with the tip across the interatrial septum.   A  MitraClip Gen 4 XTW was introduced into the left atrium and  positioned over the mitral valve leaflets.  2D and 3D  guidance was used to position the opened clip orthogonal to  the commissural plane.  The anterior and posterior grippers  were tested for identification and function.  The device  was advanced across the mitral annulus and re-positioned.  The anterior and posterior leaflets were ultimately grasped  just lateral to the center on A2/P2 where the posterior  leaflet tissue was the most optimal for grasping (the more  medial P2 scallop tissue was inadequate).  2-D and biplane  imaging were used to ensure both leaflets were within the  clip with good leaflet insertion, the grippers were lowered  and the clip was closed under live MPR imaging and biplane  interrogation.  There was a good tissue bridge seen.  There  was moderate(2+) mitral regurgitation which was a reduction  with a larger jet medial to the clip as anticipated and a  small jet lateral to the clip.  The peak/mean gradients=  8/4mmHg (HR= 103bpm).  The clip was deployed and appeared  stable.  Imaging afterwards showed unchanged  moderate(2+)mitral regurgitation with jets on either side  of the clip.  The total average 3D VCA= 27mm2) with a  regurgitant volume= 27ml/beat, (MR VTI= 101cm, MR Vmax=  4.2m/s).  There was still systolic blunting but no systolic  flow reversal in the pulmonary veins.  The final peak/mean  transmitral gradients= 8/3mmHg (HR= 102bpm).  The final  planimetered MVA= 3.6cm2 (1.46cm2+2.2cm2).  The mandril was  removed under echo guidance.  Withdrawal of the catheters  and guide were monitored continuously; there was a small  residual atrial septal defect seen by color Doppler with  left-to-right shunt.   There was mild tricuspid  regurgitation.  The LVEF was grossly unchanged (LVT VTI=  13.5cm). The right ventricular size and function were  unchanged.  There was no pericardial effusion.  Images were reviewed with the interventional/surgical team  throughout the study.  ------------------------------------------------------------------------  Conclusions:  The patient was on IABP 1:1 throughout the procedure.  Confirmed on  12/16/2022 - 18:09:51 by Merari Lundberg M.D.    < end of copied text >      Cardiac Catheterization:        Assesment/Plan:    61 Y/O male S/P Mitral MONROE, S/P Cardiac Arrest secondary to arrythmia, Acute on chronic Renal injury  1.) S/P Emergent Mitral MONROE: ASA 81 mg po daily, Continue to Monitor Groins. Ambulate as tolerated when stable. Will need repeat TTE prior to discharge when stable.  2.) Renal Injury: Continue with CVVH as directed by Renal team  3.) Arrythmia: Continue with Amiodarone load  4.) Hypoxic Respiratory failure: Continue with ATC as tolerated  5.) GI/DVT prophylaxis  6.) Continue with feeds via Jeb Feed tube    BROOKE Michel  [id number]

## 2022-12-19 NOTE — PROGRESS NOTE ADULT - SUBJECTIVE AND OBJECTIVE BOX
[facility] DIVISION OF KIDNEY DISEASES AND HYPERTENSION -- FOLLOW UP NOTE  --------------------------------------------------------------------------------  Chief Complaint: Acute pancreatitis without infection or necrosis    HPI:  63 y/o male with PMH of CABG, DM, HTN, HLD presenting as transfer from [place] for c/f STEMI. Course c/b gallstone pancreatitis leading to ARDS and shock & cardiac arrest now on [facility]. Had significant troponin elevation and his LVEF down to 8%. Pt was deemed to be too unstable to have an angiogram and potential PCI due to his co-morbidities & a new subdural bleed. Pt being seen for ERIC. SCr on arrival was 2.15; trended down to hector 1.6 on 11/25 and eventually increased to 3.95 11/28. Pt received IV diuretics as per CTU. Pt initiated on CRRT on 12/5/22 to optimize volume status and electrolytes.  Pt underwent decannulation of ECMO on 12/8/22. Pt was restarted on 12/9/22 for volume overload. Pt underwent mitral clip OR (12/16/22).  s/p trach on 12/16     HPI: Pt. seen this AM, tolerating CRRT overnight with target net negative fluid balance. Pt. complaining of increased secretions. Being kept net negative 50 cc/hr.       PAST HISTORY  --------------------------------------------------------------------------------  No significant changes to PMH, PSH, FHx, SHx, unless otherwise noted    ALLERGIES & MEDICATIONS  --------------------------------------------------------------------------------  Allergies    No Known Allergies    Intolerances      Standing Inpatient Medications  aMIOdarone    Tablet 200 milliGRAM(s) Oral daily  aspirin  chewable 81 milliGRAM(s) Oral daily  atorvastatin 40 milliGRAM(s) Oral at bedtime  chlorhexidine 0.12% Liquid 15 milliLiter(s) Oral Mucosa every 12 hours  chlorhexidine 2% Cloths 1 Application(s) Topical <User Schedule>  CRRT Treatment    <Continuous>  heparin   Injectable 5000 Unit(s) SubCutaneous every 8 hours  heparin  Infusion Syringe 300 Unit(s)/Hr CRRT <Continuous>  insulin lispro (ADMELOG) corrective regimen sliding scale   SubCutaneous every 6 hours  multivitamin/minerals/iron Oral Solution (CENTRUM) 15 milliLiter(s) Oral daily  pantoprazole  Injectable 40 milliGRAM(s) IV Push daily  Phoxillum Filtration BK 4 / 2.5 5000 milliLiter(s) CRRT <Continuous>  Phoxillum Filtration BK 4 / 2.5 5000 milliLiter(s) CRRT <Continuous>  PrismaSOL Filtration BGK 4 / 2.5 5000 milliLiter(s) CRRT <Continuous>  thiamine 100 milliGRAM(s) Enteral Tube daily    PRN Inpatient Medications  acetaminophen    Suspension .. 650 milliGRAM(s) Oral every 6 hours PRN      REVIEW OF SYSTEMS  --------------------------------------------------------------------------------  Unable to obtain except as noted above.     VITALS/PHYSICAL EXAM  --------------------------------------------------------------------------------  T(C): 36.5 (12-19-22 @ 11:00), Max: 37.5 (12-19-22 @ 00:00)  HR: 88 (12-19-22 @ 12:00) (88 - 104)  BP: --  RR: 23 (12-19-22 @ 12:00) (13 - 37)  SpO2: 100% (12-19-22 @ 12:00) (96% - 100%)  Wt(kg): --        12-18-22 @ 07:01  -  12-19-22 @ 07:00  --------------------------------------------------------  IN: 2435.3 mL / OUT: 3399 mL / NET: -963.7 mL    12-19-22 @ 07:01  -  12-19-22 @ 12:26  --------------------------------------------------------  IN: 416.8 mL / OUT: 689 mL / NET: -272.2 mL      Physical Exam:  	Gen: ill appearing male, intubated   	HEENT: Anicteric  	Pulm:  trach+  	CV: S1S2+  	Abd: Soft, +BS       	Ext: LE edema B/L++  	Neuro: Sedated  	Skin: Warm and dry             Acces: CARMELO non tunneled HD catheter      LABS/STUDIES  --------------------------------------------------------------------------------              9.7    13.15 >-----------<  78       [12-19-22 @ 00:35]              30.4     134  |  101  |  20  ----------------------------<  120      [12-19-22 @ 00:34]  4.4   |  22  |  1.40        Ca     8.0     [12-19-22 @ 00:34]      Mg     2.9     [12-19-22 @ 00:34]      Phos  3.7     [12-19-22 @ 00:34]    TPro  7.0  /  Alb  3.4  /  TBili  0.9  /  DBili  x   /  AST  62  /  ALT  22  /  AlkPhos  114  [12-19-22 @ 00:34]      PTT: 27.2       [12-18-22 @ 02:21]    LDH 39      [12-18-22 @ 02:16]    Creatinine Trend:  SCr 1.40 [12-19 @ 00:34]  SCr 1.75 [12-18 @ 02:16]  SCr 1.41 [12-17 @ 00:12]  SCr 1.88 [12-16 @ 10:55]  SCr 1.41 [12-16 @ 00:23]    Urinalysis - [12-16-22 @ 04:28]      Color DK BROWN / Appearance Turbid / SG 1.029 / pH 6.0      Gluc Trace / Ketone Negative  / Bili Negative / Urobili 6 mg/dL       Blood Large / Protein 300 mg/dL / Leuk Est Large / Nitrite Negative       /  / Hyaline 85 / Gran  / Sq Epi  / Non Sq Epi 8 / Bacteria Moderate      TSH 2.84      [12-10-22 @ 15:55]  Lipid: chol --, , HDL --, LDL --      [12-05-22 @ 00:50]       [facility] DIVISION OF KIDNEY DISEASES AND HYPERTENSION -- FOLLOW UP NOTE  --------------------------------------------------------------------------------  Chief Complaint: Acute pancreatitis without infection or necrosis    HPI:  63 y/o male with PMH of CABG, DM, HTN, HLD presenting as transfer from [place] for c/f STEMI. Course c/b gallstone pancreatitis leading to ARDS and shock & cardiac arrest now on [facility]. Had significant troponin elevation and his LVEF down to 8%. Pt was deemed to be too unstable to have an angiogram and potential PCI due to his co-morbidities & a new subdural bleed. Pt being seen for ERIC. SCr on arrival was 2.15; trended down to hector 1.6 on 11/25 and eventually increased to 3.95 11/28. Pt received IV diuretics as per CTU. Pt initiated on CRRT on 12/5/22 to optimize volume status and electrolytes.  Pt underwent decannulation of ECMO on 12/8/22. Pt was restarted on 12/9/22 for volume overload. Pt underwent mitral clip OR (12/16/22).  s/p trach on 12/16     HPI: Pt. seen this AM, tolerating CRRT overnight with target net negative fluid balance. Pt. complaining of increased secretions. Being kept net negative 50 cc/hr.       PAST HISTORY  --------------------------------------------------------------------------------  No significant changes to PMH, PSH, FHx, SHx, unless otherwise noted    ALLERGIES & MEDICATIONS  --------------------------------------------------------------------------------  Allergies    No Known Allergies    Intolerances      Standing Inpatient Medications  aMIOdarone    Tablet 200 milliGRAM(s) Oral daily  aspirin  chewable 81 milliGRAM(s) Oral daily  atorvastatin 40 milliGRAM(s) Oral at bedtime  chlorhexidine 0.12% Liquid 15 milliLiter(s) Oral Mucosa every 12 hours  chlorhexidine 2% Cloths 1 Application(s) Topical <User Schedule>  CRRT Treatment    <Continuous>  heparin   Injectable 5000 Unit(s) SubCutaneous every 8 hours  heparin  Infusion Syringe 300 Unit(s)/Hr CRRT <Continuous>  insulin lispro (ADMELOG) corrective regimen sliding scale   SubCutaneous every 6 hours  multivitamin/minerals/iron Oral Solution (CENTRUM) 15 milliLiter(s) Oral daily  pantoprazole  Injectable 40 milliGRAM(s) IV Push daily  Phoxillum Filtration BK 4 / 2.5 5000 milliLiter(s) CRRT <Continuous>  Phoxillum Filtration BK 4 / 2.5 5000 milliLiter(s) CRRT <Continuous>  PrismaSOL Filtration BGK 4 / 2.5 5000 milliLiter(s) CRRT <Continuous>  thiamine 100 milliGRAM(s) Enteral Tube daily    PRN Inpatient Medications  acetaminophen    Suspension .. 650 milliGRAM(s) Oral every 6 hours PRN      REVIEW OF SYSTEMS  --------------------------------------------------------------------------------  Unable to obtain except as noted above.     VITALS/PHYSICAL EXAM  --------------------------------------------------------------------------------  T(C): 36.5 (12-19-22 @ 11:00), Max: 37.5 (12-19-22 @ 00:00)  HR: 88 (12-19-22 @ 12:00) (88 - 104)  BP: --  RR: 23 (12-19-22 @ 12:00) (13 - 37)  SpO2: 100% (12-19-22 @ 12:00) (96% - 100%)  Wt(kg): --        12-18-22 @ 07:01  -  12-19-22 @ 07:00  --------------------------------------------------------  IN: 2435.3 mL / OUT: 3399 mL / NET: -963.7 mL    12-19-22 @ 07:01  -  12-19-22 @ 12:26  --------------------------------------------------------  IN: 416.8 mL / OUT: 689 mL / NET: -272.2 mL      Physical Exam:  	Gen: ill appearing male, intubated   	HEENT: Anicteric  	Pulm:  trach+  	CV: S1S2+  	Abd: Soft, +BS       	Ext: LE edema B/L++  	Neuro: Sedated  	Skin: Warm and dry             Acces: CARMELO non tunneled HD catheter      LABS/STUDIES  --------------------------------------------------------------------------------              9.7    13.15 >-----------<  78       [12-19-22 @ 00:35]              30.4     134  |  101  |  20  ----------------------------<  120      [12-19-22 @ 00:34]  4.4   |  22  |  1.40        Ca     8.0     [12-19-22 @ 00:34]      Mg     2.9     [12-19-22 @ 00:34]      Phos  3.7     [12-19-22 @ 00:34]    TPro  7.0  /  Alb  3.4  /  TBili  0.9  /  DBili  x   /  AST  62  /  ALT  22  /  AlkPhos  114  [12-19-22 @ 00:34]      PTT: 27.2       [12-18-22 @ 02:21]    LDH 39      [12-18-22 @ 02:16]    Creatinine Trend:  SCr 1.40 [12-19 @ 00:34]  SCr 1.75 [12-18 @ 02:16]  SCr 1.41 [12-17 @ 00:12]  SCr 1.88 [12-16 @ 10:55]  SCr 1.41 [12-16 @ 00:23]    Urinalysis - [12-16-22 @ 04:28]      Color DK BROWN / Appearance Turbid / SG 1.029 / pH 6.0      Gluc Trace / Ketone Negative  / Bili Negative / Urobili 6 mg/dL       Blood Large / Protein 300 mg/dL / Leuk Est Large / Nitrite Negative       /  / Hyaline 85 / Gran  / Sq Epi  / Non Sq Epi 8 / Bacteria Moderate      TSH 2.84      [12-10-22 @ 15:55]  Lipid: chol --, , HDL --, LDL --      [12-05-22 @ 00:50]       [facility] [place] DIVISION OF KIDNEY DISEASES AND HYPERTENSION -- FOLLOW UP NOTE  --------------------------------------------------------------------------------  Chief Complaint: Acute pancreatitis without infection or necrosis    HPI:  63 y/o male with PMH of CABG, DM, HTN, HLD presenting as transfer from [place] for c/f STEMI. Course c/b gallstone pancreatitis leading to ARDS and shock & cardiac arrest now on [facility]. Had significant troponin elevation and his LVEF down to 8%. Pt was deemed to be too unstable to have an angiogram and potential PCI due to his co-morbidities & a new subdural bleed. Pt being seen for ERIC. SCr on arrival was 2.15; trended down to hector 1.6 on 11/25 and eventually increased to 3.95 11/28. Pt received IV diuretics as per CTU. Pt initiated on CRRT on 12/5/22 to optimize volume status and electrolytes.  Pt underwent decannulation of ECMO on 12/8/22. Pt was restarted on 12/9/22 for volume overload. Pt underwent mitral clip OR (12/16/22).  s/p trach on 12/16     HPI: Pt. seen this AM, tolerating CRRT overnight with target net negative fluid balance. Pt. complaining of increased secretions. Being kept net negative 50 cc/hr.       PAST HISTORY  --------------------------------------------------------------------------------  No significant changes to PMH, PSH, FHx, SHx, unless otherwise noted    ALLERGIES & MEDICATIONS  --------------------------------------------------------------------------------  Allergies    No Known Allergies    Intolerances      Standing Inpatient Medications  aMIOdarone    Tablet 200 milliGRAM(s) Oral daily  aspirin  chewable 81 milliGRAM(s) Oral daily  atorvastatin 40 milliGRAM(s) Oral at bedtime  chlorhexidine 0.12% Liquid 15 milliLiter(s) Oral Mucosa every 12 hours  chlorhexidine 2% Cloths 1 Application(s) Topical <User Schedule>  CRRT Treatment    <Continuous>  heparin   Injectable 5000 Unit(s) SubCutaneous every 8 hours  heparin  Infusion Syringe 300 Unit(s)/Hr CRRT <Continuous>  insulin lispro (ADMELOG) corrective regimen sliding scale   SubCutaneous every 6 hours  multivitamin/minerals/iron Oral Solution (CENTRUM) 15 milliLiter(s) Oral daily  pantoprazole  Injectable 40 milliGRAM(s) IV Push daily  Phoxillum Filtration BK 4 / 2.5 5000 milliLiter(s) CRRT <Continuous>  Phoxillum Filtration BK 4 / 2.5 5000 milliLiter(s) CRRT <Continuous>  PrismaSOL Filtration BGK 4 / 2.5 5000 milliLiter(s) CRRT <Continuous>  thiamine 100 milliGRAM(s) Enteral Tube daily    PRN Inpatient Medications  acetaminophen    Suspension .. 650 milliGRAM(s) Oral every 6 hours PRN      REVIEW OF SYSTEMS  --------------------------------------------------------------------------------  Unable to obtain except as noted above.     VITALS/PHYSICAL EXAM  --------------------------------------------------------------------------------  T(C): 36.5 (12-19-22 @ 11:00), Max: 37.5 (12-19-22 @ 00:00)  HR: 88 (12-19-22 @ 12:00) (88 - 104)  BP: --  RR: 23 (12-19-22 @ 12:00) (13 - 37)  SpO2: 100% (12-19-22 @ 12:00) (96% - 100%)  Wt(kg): --        12-18-22 @ 07:01  -  12-19-22 @ 07:00  --------------------------------------------------------  IN: 2435.3 mL / OUT: 3399 mL / NET: -963.7 mL    12-19-22 @ 07:01  -  12-19-22 @ 12:26  --------------------------------------------------------  IN: 416.8 mL / OUT: 689 mL / NET: -272.2 mL      Physical Exam:  	Gen: ill appearing male, intubated   	HEENT: Anicteric  	Pulm:  trach+  	CV: S1S2+  	Abd: Soft, +BS       	Ext: LE edema B/L++  	Neuro: Sedated  	Skin: Warm and dry             Acces: CARMELO non tunneled HD catheter      LABS/STUDIES  --------------------------------------------------------------------------------              9.7    13.15 >-----------<  78       [12-19-22 @ 00:35]              30.4     134  |  101  |  20  ----------------------------<  120      [12-19-22 @ 00:34]  4.4   |  22  |  1.40        Ca     8.0     [12-19-22 @ 00:34]      Mg     2.9     [12-19-22 @ 00:34]      Phos  3.7     [12-19-22 @ 00:34]    TPro  7.0  /  Alb  3.4  /  TBili  0.9  /  DBili  x   /  AST  62  /  ALT  22  /  AlkPhos  114  [12-19-22 @ 00:34]      PTT: 27.2       [12-18-22 @ 02:21]    LDH 39      [12-18-22 @ 02:16]    Creatinine Trend:  SCr 1.40 [12-19 @ 00:34]  SCr 1.75 [12-18 @ 02:16]  SCr 1.41 [12-17 @ 00:12]  SCr 1.88 [12-16 @ 10:55]  SCr 1.41 [12-16 @ 00:23]    Urinalysis - [12-16-22 @ 04:28]      Color DK BROWN / Appearance Turbid / SG 1.029 / pH 6.0      Gluc Trace / Ketone Negative  / Bili Negative / Urobili 6 mg/dL       Blood Large / Protein 300 mg/dL / Leuk Est Large / Nitrite Negative       /  / Hyaline 85 / Gran  / Sq Epi  / Non Sq Epi 8 / Bacteria Moderate      TSH 2.84      [12-10-22 @ 15:55]  Lipid: chol --, , HDL --, LDL --      [12-05-22 @ 00:50]

## 2022-12-19 NOTE — PROGRESS NOTE ADULT - PROBLEM SELECTOR PLAN 1
- Keep the Trach site clean and dry.   - Remove Sutures on POD #7.   - Keep the Omniflex for a week to avoid the manipulation of the stoma.   - Elevate HOB.   - Gentle suction prn.   - Vent per RT.

## 2022-12-20 LAB
ALBUMIN SERPL ELPH-MCNC: 3 G/DL — LOW (ref 3.3–5)
ALP SERPL-CCNC: 113 U/L — SIGNIFICANT CHANGE UP (ref 40–120)
ALT FLD-CCNC: 24 U/L — SIGNIFICANT CHANGE UP (ref 10–45)
AMYLASE P1 CFR SERPL: 284 U/L — HIGH (ref 25–125)
ANION GAP SERPL CALC-SCNC: 13 MMOL/L — SIGNIFICANT CHANGE UP (ref 5–17)
AST SERPL-CCNC: 49 U/L — HIGH (ref 10–40)
BILIRUB SERPL-MCNC: 0.8 MG/DL — SIGNIFICANT CHANGE UP (ref 0.2–1.2)
BUN SERPL-MCNC: 34 MG/DL — HIGH (ref 7–23)
CALCIUM SERPL-MCNC: 8 MG/DL — LOW (ref 8.4–10.5)
CHLORIDE SERPL-SCNC: 102 MMOL/L — SIGNIFICANT CHANGE UP (ref 96–108)
CO2 SERPL-SCNC: 21 MMOL/L — LOW (ref 22–31)
CREAT SERPL-MCNC: 2.25 MG/DL — HIGH (ref 0.5–1.3)
CULTURE RESULTS: SIGNIFICANT CHANGE UP
EGFR: 32 ML/MIN/1.73M2 — LOW
GAS PNL BLDA: SIGNIFICANT CHANGE UP
GLUCOSE BLDC GLUCOMTR-MCNC: 153 MG/DL — HIGH (ref 70–99)
GLUCOSE BLDC GLUCOMTR-MCNC: 161 MG/DL — HIGH (ref 70–99)
GLUCOSE BLDC GLUCOMTR-MCNC: 167 MG/DL — HIGH (ref 70–99)
GLUCOSE BLDC GLUCOMTR-MCNC: 230 MG/DL — HIGH (ref 70–99)
GLUCOSE SERPL-MCNC: 181 MG/DL — HIGH (ref 70–99)
HBV CORE AB SER-ACNC: REACTIVE
HBV CORE IGM SER-ACNC: SIGNIFICANT CHANGE UP
HBV SURFACE AB SER-ACNC: 889.7 MIU/ML — SIGNIFICANT CHANGE UP
HCT VFR BLD CALC: 31.2 % — LOW (ref 39–50)
HCV AB S/CO SERPL IA: 0.15 S/CO — SIGNIFICANT CHANGE UP (ref 0–0.99)
HCV AB SERPL-IMP: SIGNIFICANT CHANGE UP
HGB BLD-MCNC: 9.5 G/DL — LOW (ref 13–17)
LIDOCAIN IGE QN: 1098 U/L — HIGH (ref 7–60)
MAGNESIUM SERPL-MCNC: 3 MG/DL — HIGH (ref 1.6–2.6)
MCHC RBC-ENTMCNC: 28.4 PG — SIGNIFICANT CHANGE UP (ref 27–34)
MCHC RBC-ENTMCNC: 30.4 GM/DL — LOW (ref 32–36)
MCV RBC AUTO: 93.4 FL — SIGNIFICANT CHANGE UP (ref 80–100)
MRSA PCR RESULT.: SIGNIFICANT CHANGE UP
NRBC # BLD: 0 /100 WBCS — SIGNIFICANT CHANGE UP (ref 0–0)
PHOSPHATE SERPL-MCNC: 4.1 MG/DL — SIGNIFICANT CHANGE UP (ref 2.5–4.5)
PLATELET # BLD AUTO: 102 K/UL — LOW (ref 150–400)
POTASSIUM SERPL-MCNC: 4.6 MMOL/L — SIGNIFICANT CHANGE UP (ref 3.5–5.3)
POTASSIUM SERPL-SCNC: 4.6 MMOL/L — SIGNIFICANT CHANGE UP (ref 3.5–5.3)
PROT SERPL-MCNC: 7 G/DL — SIGNIFICANT CHANGE UP (ref 6–8.3)
RBC # BLD: 3.34 M/UL — LOW (ref 4.2–5.8)
RBC # FLD: 18.6 % — HIGH (ref 10.3–14.5)
S AUREUS DNA NOSE QL NAA+PROBE: SIGNIFICANT CHANGE UP
SODIUM SERPL-SCNC: 136 MMOL/L — SIGNIFICANT CHANGE UP (ref 135–145)
SPECIMEN SOURCE: SIGNIFICANT CHANGE UP
WBC # BLD: 12.77 K/UL — HIGH (ref 3.8–10.5)
WBC # FLD AUTO: 12.77 K/UL — HIGH (ref 3.8–10.5)

## 2022-12-20 PROCEDURE — 99232 SBSQ HOSP IP/OBS MODERATE 35: CPT | Mod: GC

## 2022-12-20 PROCEDURE — 99232 SBSQ HOSP IP/OBS MODERATE 35: CPT | Mod: 25

## 2022-12-20 PROCEDURE — 99291 CRITICAL CARE FIRST HOUR: CPT

## 2022-12-20 PROCEDURE — 71045 X-RAY EXAM CHEST 1 VIEW: CPT | Mod: 26

## 2022-12-20 PROCEDURE — 71045 X-RAY EXAM CHEST 1 VIEW: CPT | Mod: 26,77

## 2022-12-20 PROCEDURE — 31615 TRCHEOBRNCHSC EST TRACHS INC: CPT

## 2022-12-20 RX ORDER — IPRATROPIUM BROMIDE 0.2 MG/ML
500 SOLUTION, NON-ORAL INHALATION EVERY 6 HOURS
Refills: 0 | Status: COMPLETED | OUTPATIENT
Start: 2022-12-20 | End: 2022-12-21

## 2022-12-20 RX ORDER — HYDROMORPHONE HYDROCHLORIDE 2 MG/ML
0.5 INJECTION INTRAMUSCULAR; INTRAVENOUS; SUBCUTANEOUS ONCE
Refills: 0 | Status: DISCONTINUED | OUTPATIENT
Start: 2022-12-20 | End: 2022-12-20

## 2022-12-20 RX ORDER — ACETYLCYSTEINE 200 MG/ML
4 VIAL (ML) MISCELLANEOUS EVERY 6 HOURS
Refills: 0 | Status: COMPLETED | OUTPATIENT
Start: 2022-12-20 | End: 2022-12-21

## 2022-12-20 RX ORDER — METOPROLOL TARTRATE 50 MG
12.5 TABLET ORAL EVERY 12 HOURS
Refills: 0 | Status: DISCONTINUED | OUTPATIENT
Start: 2022-12-20 | End: 2022-12-23

## 2022-12-20 RX ORDER — CHLORHEXIDINE GLUCONATE 213 G/1000ML
1 SOLUTION TOPICAL
Refills: 0 | Status: DISCONTINUED | OUTPATIENT
Start: 2022-12-20 | End: 2022-12-26

## 2022-12-20 RX ORDER — SODIUM CHLORIDE 9 MG/ML
10 INJECTION INTRAMUSCULAR; INTRAVENOUS; SUBCUTANEOUS
Refills: 0 | Status: DISCONTINUED | OUTPATIENT
Start: 2022-12-20 | End: 2023-01-20

## 2022-12-20 RX ADMIN — Medication 12.5 MILLIGRAM(S): at 22:26

## 2022-12-20 RX ADMIN — Medication 4 MILLILITER(S): at 15:55

## 2022-12-20 RX ADMIN — HEPARIN SODIUM 5000 UNIT(S): 5000 INJECTION INTRAVENOUS; SUBCUTANEOUS at 01:13

## 2022-12-20 RX ADMIN — ATORVASTATIN CALCIUM 40 MILLIGRAM(S): 80 TABLET, FILM COATED ORAL at 22:27

## 2022-12-20 RX ADMIN — Medication 15 MILLILITER(S): at 17:15

## 2022-12-20 RX ADMIN — HEPARIN SODIUM 5000 UNIT(S): 5000 INJECTION INTRAVENOUS; SUBCUTANEOUS at 17:45

## 2022-12-20 RX ADMIN — PANTOPRAZOLE SODIUM 40 MILLIGRAM(S): 20 TABLET, DELAYED RELEASE ORAL at 12:00

## 2022-12-20 RX ADMIN — AMIODARONE HYDROCHLORIDE 200 MILLIGRAM(S): 400 TABLET ORAL at 05:47

## 2022-12-20 RX ADMIN — HEPARIN SODIUM 5000 UNIT(S): 5000 INJECTION INTRAVENOUS; SUBCUTANEOUS at 09:29

## 2022-12-20 RX ADMIN — Medication 81 MILLIGRAM(S): at 17:13

## 2022-12-20 RX ADMIN — Medication 4: at 12:00

## 2022-12-20 RX ADMIN — Medication 100 MILLIGRAM(S): at 17:16

## 2022-12-20 RX ADMIN — CHLORHEXIDINE GLUCONATE 15 MILLILITER(S): 213 SOLUTION TOPICAL at 18:06

## 2022-12-20 RX ADMIN — Medication 2: at 05:47

## 2022-12-20 RX ADMIN — Medication 500 MICROGRAM(S): at 15:55

## 2022-12-20 RX ADMIN — CHLORHEXIDINE GLUCONATE 1 APPLICATION(S): 213 SOLUTION TOPICAL at 05:33

## 2022-12-20 RX ADMIN — Medication 2: at 01:13

## 2022-12-20 RX ADMIN — Medication 2: at 17:57

## 2022-12-20 RX ADMIN — CHLORHEXIDINE GLUCONATE 15 MILLILITER(S): 213 SOLUTION TOPICAL at 05:33

## 2022-12-20 RX ADMIN — Medication 4 MILLILITER(S): at 23:27

## 2022-12-20 RX ADMIN — Medication 500 MICROGRAM(S): at 23:27

## 2022-12-20 NOTE — PROGRESS NOTE ADULT - SUBJECTIVE AND OBJECTIVE BOX
ENT ISSUE/POD: S/P #7 Proximal XLT tracheostomy POD#4    HPI: 63 YO M with PMH of CABG, DM, HTN, HLD presenting as transfer from [place] for STEMI. Course c/b gallstone pancreatitis leading to ARDS, shock, cardiac arrest then placed on [facility] ECMO. Pt underwent ECMO decannulation. ENT called to evaluate for tracheostomy.  Now S/P #7 Proximal XLT Tracheostomy POD # 4.  Doing well on the ventilator. No bleeding noted.         PAST MEDICAL & SURGICAL HISTORY:    Allergies    No Known Allergies    Intolerances      MEDICATIONS  (STANDING):  aMIOdarone    Tablet 200 milliGRAM(s) Oral daily  aspirin  chewable 81 milliGRAM(s) Oral daily  atorvastatin 40 milliGRAM(s) Oral at bedtime  chlorhexidine 0.12% Liquid 15 milliLiter(s) Oral Mucosa every 12 hours  chlorhexidine 2% Cloths 1 Application(s) Topical <User Schedule>  CRRT Treatment    <Continuous>  heparin   Injectable 5000 Unit(s) SubCutaneous every 8 hours  heparin  Infusion Syringe 300 Unit(s)/Hr (0.6 mL/Hr) CRRT <Continuous>  insulin lispro (ADMELOG) corrective regimen sliding scale   SubCutaneous every 6 hours  multivitamin/minerals/iron Oral Solution (CENTRUM) 15 milliLiter(s) Oral daily  pantoprazole  Injectable 40 milliGRAM(s) IV Push daily  Phoxillum Filtration BK 4 / 2.5 5000 milliLiter(s) (2400 mL/Hr) CRRT <Continuous>  Phoxillum Filtration BK 4 / 2.5 5000 milliLiter(s) (800 mL/Hr) CRRT <Continuous>  PrismaSOL Filtration BGK 4 / 2.5 5000 milliLiter(s) (200 mL/Hr) CRRT <Continuous>  thiamine 100 milliGRAM(s) Enteral Tube daily    MEDICATIONS  (PRN):  acetaminophen    Suspension .. 650 milliGRAM(s) Oral every 6 hours PRN Temp greater or equal to 38.5C (101.3F)      Social History: see consult    Family history: see consult    ROS:   ENT: all negative except as noted in HPI   Pulm: denies SOB, cough, hemoptysis  Neuro: denies numbness/tingling, loss of sensation  Endo: denies heat/cold intolerance, excessive sweating      Vital Signs Last 24 Hrs  T(C): 36.6 (20 Dec 2022 08:00), Max: 37.4 (20 Dec 2022 00:00)  T(F): 97.8 (20 Dec 2022 08:00), Max: 99.3 (20 Dec 2022 00:00)  HR: 104 (20 Dec 2022 08:56) (88 - 112)  BP: --  BP(mean): --  RR: 30 (20 Dec 2022 08:56) (15 - 36)  SpO2: 98% (20 Dec 2022 08:56) (95% - 100%)    Parameters below as of 20 Dec 2022 04:00  Patient On (Oxygen Delivery Method): ventilator  O2 Flow (L/min): 40                            9.5    12.77 )-----------( 102      ( 20 Dec 2022 00:52 )             31.2    12-20    136  |  102  |  34<H>  ----------------------------<  181<H>  4.6   |  21<L>  |  2.25<H>    Ca    8.0<L>      20 Dec 2022 00:52  Phos  4.1     12-20  Mg     3.0     12-20    TPro  7.0  /  Alb  3.0<L>  /  TBili  0.8  /  DBili  x   /  AST  49<H>  /  ALT  24  /  AlkPhos  113  12-20       PHYSICAL EXAM:  Gen: NAD, on vent  Skin: No rashes, bruises, or lesions  Head: Normocephalic, Atraumatic  Face: no edema, erythema, or fluctuance. Parotid glands soft without mass  Eyes: no scleral injection  Nose: Nares bilaterally patent, no discharge  Mouth: No Stridor / Drooling / Trismus.  Mucosa moist, tongue/uvula midline, oropharynx clear  Neck: #7 Proximal XLT Trach  secured with sutures x 4 and umbilical tie.  No hematoma or crepitus.  Flat, supple, no lymphadenopathy, trachea midline, no masses  Lymphatic: No lymphadenopathy  Resp: on vent   Neuro: facial nerve intact, no facial droop         ENT ISSUE/POD: S/P #7 Proximal XLT tracheostomy POD#4    HPI: 63 YO M with PMH of CABG, DM, HTN, HLD presenting as transfer from [place] for STEMI. Course c/b gallstone pancreatitis leading to ARDS, shock, cardiac arrest then placed on [facility] ECMO. Pt underwent ECMO decannulation. ENT called to evaluate for tracheostomy.  Now S/P #7 Proximal XLT Tracheostomy POD # 4.  Doing well on the ventilator. No bleeding noted.         PAST MEDICAL & SURGICAL HISTORY:    Allergies    No Known Allergies    Intolerances      MEDICATIONS  (STANDING):  aMIOdarone    Tablet 200 milliGRAM(s) Oral daily  aspirin  chewable 81 milliGRAM(s) Oral daily  atorvastatin 40 milliGRAM(s) Oral at bedtime  chlorhexidine 0.12% Liquid 15 milliLiter(s) Oral Mucosa every 12 hours  chlorhexidine 2% Cloths 1 Application(s) Topical <User Schedule>  CRRT Treatment    <Continuous>  heparin   Injectable 5000 Unit(s) SubCutaneous every 8 hours  heparin  Infusion Syringe 300 Unit(s)/Hr (0.6 mL/Hr) CRRT <Continuous>  insulin lispro (ADMELOG) corrective regimen sliding scale   SubCutaneous every 6 hours  multivitamin/minerals/iron Oral Solution (CENTRUM) 15 milliLiter(s) Oral daily  pantoprazole  Injectable 40 milliGRAM(s) IV Push daily  Phoxillum Filtration BK 4 / 2.5 5000 milliLiter(s) (2400 mL/Hr) CRRT <Continuous>  Phoxillum Filtration BK 4 / 2.5 5000 milliLiter(s) (800 mL/Hr) CRRT <Continuous>  PrismaSOL Filtration BGK 4 / 2.5 5000 milliLiter(s) (200 mL/Hr) CRRT <Continuous>  thiamine 100 milliGRAM(s) Enteral Tube daily    MEDICATIONS  (PRN):  acetaminophen    Suspension .. 650 milliGRAM(s) Oral every 6 hours PRN Temp greater or equal to 38.5C (101.3F)      Social History: see consult    Family history: see consult    ROS:   ENT: all negative except as noted in HPI   Pulm: denies SOB, cough, hemoptysis  Neuro: denies numbness/tingling, loss of sensation  Endo: denies heat/cold intolerance, excessive sweating      Vital Signs Last 24 Hrs  T(C): 36.6 (20 Dec 2022 08:00), Max: 37.4 (20 Dec 2022 00:00)  T(F): 97.8 (20 Dec 2022 08:00), Max: 99.3 (20 Dec 2022 00:00)  HR: 104 (20 Dec 2022 08:56) (88 - 112)  BP: --  BP(mean): --  RR: 30 (20 Dec 2022 08:56) (15 - 36)  SpO2: 98% (20 Dec 2022 08:56) (95% - 100%)    Parameters below as of 20 Dec 2022 04:00  Patient On (Oxygen Delivery Method): ventilator  O2 Flow (L/min): 40                            9.5    12.77 )-----------( 102      ( 20 Dec 2022 00:52 )             31.2    12-20    136  |  102  |  34<H>  ----------------------------<  181<H>  4.6   |  21<L>  |  2.25<H>    Ca    8.0<L>      20 Dec 2022 00:52  Phos  4.1     12-20  Mg     3.0     12-20    TPro  7.0  /  Alb  3.0<L>  /  TBili  0.8  /  DBili  x   /  AST  49<H>  /  ALT  24  /  AlkPhos  113  12-20       PHYSICAL EXAM:  Gen: NAD, on vent  Skin: No rashes, bruises, or lesions  Head: Normocephalic, Atraumatic  Face: no edema, erythema, or fluctuance. Parotid glands soft without mass  Eyes: no scleral injection  Nose: Nares bilaterally patent, no discharge  Mouth: No Stridor / Drooling / Trismus.  Mucosa moist, tongue/uvula midline, oropharynx clear  Neck: #7 Proximal XLT Trach  secured with sutures x 4 and umbilical tie.  No hematoma or crepitus.  Flat, supple, no lymphadenopathy, trachea midline, no masses  Lymphatic: No lymphadenopathy  Resp: on vent   Neuro: facial nerve intact, no facial droop         ENT ISSUE/POD: S/P #7 Proximal XLT tracheostomy POD#4    HPI: 61 YO M with PMH of CABG, DM, HTN, HLD presenting as transfer from [place] for STEMI. Course c/b gallstone pancreatitis leading to ARDS, shock, cardiac arrest then placed on [facility] ECMO. Pt underwent ECMO decannulation. ENT called to evaluate for tracheostomy.  Now S/P #7 Proximal XLT Tracheostomy POD # 4.  Doing well on the ventilator. No bleeding noted.         PAST MEDICAL & SURGICAL HISTORY:    Allergies    No Known Allergies    Intolerances      MEDICATIONS  (STANDING):  aMIOdarone    Tablet 200 milliGRAM(s) Oral daily  aspirin  chewable 81 milliGRAM(s) Oral daily  atorvastatin 40 milliGRAM(s) Oral at bedtime  chlorhexidine 0.12% Liquid 15 milliLiter(s) Oral Mucosa every 12 hours  chlorhexidine 2% Cloths 1 Application(s) Topical <User Schedule>  CRRT Treatment    <Continuous>  heparin   Injectable 5000 Unit(s) SubCutaneous every 8 hours  heparin  Infusion Syringe 300 Unit(s)/Hr (0.6 mL/Hr) CRRT <Continuous>  insulin lispro (ADMELOG) corrective regimen sliding scale   SubCutaneous every 6 hours  multivitamin/minerals/iron Oral Solution (CENTRUM) 15 milliLiter(s) Oral daily  pantoprazole  Injectable 40 milliGRAM(s) IV Push daily  Phoxillum Filtration BK 4 / 2.5 5000 milliLiter(s) (2400 mL/Hr) CRRT <Continuous>  Phoxillum Filtration BK 4 / 2.5 5000 milliLiter(s) (800 mL/Hr) CRRT <Continuous>  PrismaSOL Filtration BGK 4 / 2.5 5000 milliLiter(s) (200 mL/Hr) CRRT <Continuous>  thiamine 100 milliGRAM(s) Enteral Tube daily    MEDICATIONS  (PRN):  acetaminophen    Suspension .. 650 milliGRAM(s) Oral every 6 hours PRN Temp greater or equal to 38.5C (101.3F)      Social History: see consult    Family history: see consult    ROS:   ENT: all negative except as noted in HPI   Pulm: denies SOB, cough, hemoptysis  Neuro: denies numbness/tingling, loss of sensation  Endo: denies heat/cold intolerance, excessive sweating      Vital Signs Last 24 Hrs  T(C): 36.6 (20 Dec 2022 08:00), Max: 37.4 (20 Dec 2022 00:00)  T(F): 97.8 (20 Dec 2022 08:00), Max: 99.3 (20 Dec 2022 00:00)  HR: 104 (20 Dec 2022 08:56) (88 - 112)  BP: --  BP(mean): --  RR: 30 (20 Dec 2022 08:56) (15 - 36)  SpO2: 98% (20 Dec 2022 08:56) (95% - 100%)    Parameters below as of 20 Dec 2022 04:00  Patient On (Oxygen Delivery Method): ventilator  O2 Flow (L/min): 40                            9.5    12.77 )-----------( 102      ( 20 Dec 2022 00:52 )             31.2    12-20    136  |  102  |  34<H>  ----------------------------<  181<H>  4.6   |  21<L>  |  2.25<H>    Ca    8.0<L>      20 Dec 2022 00:52  Phos  4.1     12-20  Mg     3.0     12-20    TPro  7.0  /  Alb  3.0<L>  /  TBili  0.8  /  DBili  x   /  AST  49<H>  /  ALT  24  /  AlkPhos  113  12-20       PHYSICAL EXAM:  Gen: NAD, on vent  Skin: No rashes, bruises, or lesions  Head: Normocephalic, Atraumatic  Face: no edema, erythema, or fluctuance. Parotid glands soft without mass  Eyes: no scleral injection  Nose: Nares bilaterally patent, no discharge  Mouth: No Stridor / Drooling / Trismus.  Mucosa moist, tongue/uvula midline, oropharynx clear  Neck: #7 Proximal XLT Trach  secured with sutures x 4 and umbilical tie.  No hematoma or crepitus.  Flat, supple, no lymphadenopathy, trachea midline, no masses  Lymphatic: No lymphadenopathy  Resp: on vent   Neuro: facial nerve intact, no facial droop         ENT ISSUE/POD: S/P #7 Proximal XLT tracheostomy POD#4    HPI: 61 YO M with PMH of CABG, DM, HTN, HLD presenting as transfer from [place] for STEMI. Course c/b gallstone pancreatitis leading to ARDS, shock, cardiac arrest then placed on [facility] ECMO. Pt underwent ECMO decannulation. ENT called to evaluate for tracheostomy.  Now S/P #7 Proximal XLT Tracheostomy POD # 4.  Doing well on the ventilator. No bleeding noted.         PAST MEDICAL & SURGICAL HISTORY:    Allergies    No Known Allergies    Intolerances      MEDICATIONS  (STANDING):  aMIOdarone    Tablet 200 milliGRAM(s) Oral daily  aspirin  chewable 81 milliGRAM(s) Oral daily  atorvastatin 40 milliGRAM(s) Oral at bedtime  chlorhexidine 0.12% Liquid 15 milliLiter(s) Oral Mucosa every 12 hours  chlorhexidine 2% Cloths 1 Application(s) Topical <User Schedule>  CRRT Treatment    <Continuous>  heparin   Injectable 5000 Unit(s) SubCutaneous every 8 hours  heparin  Infusion Syringe 300 Unit(s)/Hr (0.6 mL/Hr) CRRT <Continuous>  insulin lispro (ADMELOG) corrective regimen sliding scale   SubCutaneous every 6 hours  multivitamin/minerals/iron Oral Solution (CENTRUM) 15 milliLiter(s) Oral daily  pantoprazole  Injectable 40 milliGRAM(s) IV Push daily  Phoxillum Filtration BK 4 / 2.5 5000 milliLiter(s) (2400 mL/Hr) CRRT <Continuous>  Phoxillum Filtration BK 4 / 2.5 5000 milliLiter(s) (800 mL/Hr) CRRT <Continuous>  PrismaSOL Filtration BGK 4 / 2.5 5000 milliLiter(s) (200 mL/Hr) CRRT <Continuous>  thiamine 100 milliGRAM(s) Enteral Tube daily    MEDICATIONS  (PRN):  acetaminophen    Suspension .. 650 milliGRAM(s) Oral every 6 hours PRN Temp greater or equal to 38.5C (101.3F)      Social History: see consult    Family history: see consult    ROS:   ENT: all negative except as noted in HPI   Pulm: denies SOB, cough, hemoptysis  Neuro: denies numbness/tingling, loss of sensation  Endo: denies heat/cold intolerance, excessive sweating      Vital Signs Last 24 Hrs  T(C): 36.6 (20 Dec 2022 08:00), Max: 37.4 (20 Dec 2022 00:00)  T(F): 97.8 (20 Dec 2022 08:00), Max: 99.3 (20 Dec 2022 00:00)  HR: 104 (20 Dec 2022 08:56) (88 - 112)  BP: --  BP(mean): --  RR: 30 (20 Dec 2022 08:56) (15 - 36)  SpO2: 98% (20 Dec 2022 08:56) (95% - 100%)    Parameters below as of 20 Dec 2022 04:00  Patient On (Oxygen Delivery Method): ventilator  O2 Flow (L/min): 40                            9.5    12.77 )-----------( 102      ( 20 Dec 2022 00:52 )             31.2    12-20    136  |  102  |  34<H>  ----------------------------<  181<H>  4.6   |  21<L>  |  2.25<H>    Ca    8.0<L>      20 Dec 2022 00:52  Phos  4.1     12-20  Mg     3.0     12-20    TPro  7.0  /  Alb  3.0<L>  /  TBili  0.8  /  DBili  x   /  AST  49<H>  /  ALT  24  /  AlkPhos  113  12-20       PHYSICAL EXAM:  Gen: NAD, on vent  Skin: No rashes, bruises, or lesions  Head: Normocephalic, Atraumatic  Face: no edema, erythema, or fluctuance. Parotid glands soft without mass  Eyes: no scleral injection  Nose: Nares bilaterally patent, no discharge  Mouth: No Stridor / Drooling / Trismus.  Mucosa moist, tongue/uvula midline, oropharynx clear  Neck: #7 Proximal XLT Trach  secured with sutures x 4 and umbilical tie.  No hematoma or crepitus.  Flat, supple, no lymphadenopathy, trachea midline, no masses  Lymphatic: No lymphadenopathy  Resp: on vent   Neuro: facial nerve intact, no facial droop      Tracheoscope was performed and no blood was seen pooling from mainstem bronchi, no streaking of blood on wall of trachea, no trauma seen from suctionning. There was small amount of oozing from inferior stoma       ENT ISSUE/POD: S/P #7 Proximal XLT tracheostomy POD#4    HPI: 61 YO M with PMH of CABG, DM, HTN, HLD presenting as transfer from [place] for STEMI. Course c/b gallstone pancreatitis leading to ARDS, shock, cardiac arrest then placed on [facility] ECMO. Pt underwent ECMO decannulation. ENT called to evaluate for tracheostomy.  Now S/P #7 Proximal XLT Tracheostomy POD # 4.  Doing well on the ventilator. No bleeding noted.         PAST MEDICAL & SURGICAL HISTORY:    Allergies    No Known Allergies    Intolerances      MEDICATIONS  (STANDING):  aMIOdarone    Tablet 200 milliGRAM(s) Oral daily  aspirin  chewable 81 milliGRAM(s) Oral daily  atorvastatin 40 milliGRAM(s) Oral at bedtime  chlorhexidine 0.12% Liquid 15 milliLiter(s) Oral Mucosa every 12 hours  chlorhexidine 2% Cloths 1 Application(s) Topical <User Schedule>  CRRT Treatment    <Continuous>  heparin   Injectable 5000 Unit(s) SubCutaneous every 8 hours  heparin  Infusion Syringe 300 Unit(s)/Hr (0.6 mL/Hr) CRRT <Continuous>  insulin lispro (ADMELOG) corrective regimen sliding scale   SubCutaneous every 6 hours  multivitamin/minerals/iron Oral Solution (CENTRUM) 15 milliLiter(s) Oral daily  pantoprazole  Injectable 40 milliGRAM(s) IV Push daily  Phoxillum Filtration BK 4 / 2.5 5000 milliLiter(s) (2400 mL/Hr) CRRT <Continuous>  Phoxillum Filtration BK 4 / 2.5 5000 milliLiter(s) (800 mL/Hr) CRRT <Continuous>  PrismaSOL Filtration BGK 4 / 2.5 5000 milliLiter(s) (200 mL/Hr) CRRT <Continuous>  thiamine 100 milliGRAM(s) Enteral Tube daily    MEDICATIONS  (PRN):  acetaminophen    Suspension .. 650 milliGRAM(s) Oral every 6 hours PRN Temp greater or equal to 38.5C (101.3F)      Social History: see consult    Family history: see consult    ROS:   ENT: all negative except as noted in HPI   Pulm: denies SOB, cough, hemoptysis  Neuro: denies numbness/tingling, loss of sensation  Endo: denies heat/cold intolerance, excessive sweating      Vital Signs Last 24 Hrs  T(C): 36.6 (20 Dec 2022 08:00), Max: 37.4 (20 Dec 2022 00:00)  T(F): 97.8 (20 Dec 2022 08:00), Max: 99.3 (20 Dec 2022 00:00)  HR: 104 (20 Dec 2022 08:56) (88 - 112)  BP: --  BP(mean): --  RR: 30 (20 Dec 2022 08:56) (15 - 36)  SpO2: 98% (20 Dec 2022 08:56) (95% - 100%)    Parameters below as of 20 Dec 2022 04:00  Patient On (Oxygen Delivery Method): ventilator  O2 Flow (L/min): 40                            9.5    12.77 )-----------( 102      ( 20 Dec 2022 00:52 )             31.2    12-20    136  |  102  |  34<H>  ----------------------------<  181<H>  4.6   |  21<L>  |  2.25<H>    Ca    8.0<L>      20 Dec 2022 00:52  Phos  4.1     12-20  Mg     3.0     12-20    TPro  7.0  /  Alb  3.0<L>  /  TBili  0.8  /  DBili  x   /  AST  49<H>  /  ALT  24  /  AlkPhos  113  12-20       PHYSICAL EXAM:  Gen: NAD, on vent  Skin: No rashes, bruises, or lesions  Head: Normocephalic, Atraumatic  Face: no edema, erythema, or fluctuance. Parotid glands soft without mass  Eyes: no scleral injection  Nose: Nares bilaterally patent, no discharge  Mouth: No Stridor / Drooling / Trismus.  Mucosa moist, tongue/uvula midline, oropharynx clear  Neck: #7 Proximal XLT Trach  secured with sutures x 4 and umbilical tie.  No hematoma or crepitus.  Flat, supple, no lymphadenopathy, trachea midline, no masses  Lymphatic: No lymphadenopathy  Resp: on vent   Neuro: facial nerve intact, no facial droop      Tracheoscope was performed and no blood was seen pooling from mainstem bronchi, no streaking of blood on wall of trachea, no trauma seen from suctionning. There was small amount of oozing from inferior stoma       ENT ISSUE/POD: S/P #7 Proximal XLT tracheostomy POD#4    HPI: 61 YO M with PMH of CABG, DM, HTN, HLD presenting as transfer from [place] for STEMI. Course c/b gallstone pancreatitis leading to ARDS, shock, cardiac arrest then placed on [facility] ECMO. Pt underwent ECMO decannulation. ENT called to evaluate for tracheostomy.  Now S/P #7 Proximal XLT Tracheostomy POD # 4.  Doing well on the ventilator. No bleeding noted.         PAST MEDICAL & SURGICAL HISTORY:    Allergies    No Known Allergies    Intolerances      MEDICATIONS  (STANDING):  aMIOdarone    Tablet 200 milliGRAM(s) Oral daily  aspirin  chewable 81 milliGRAM(s) Oral daily  atorvastatin 40 milliGRAM(s) Oral at bedtime  chlorhexidine 0.12% Liquid 15 milliLiter(s) Oral Mucosa every 12 hours  chlorhexidine 2% Cloths 1 Application(s) Topical <User Schedule>  CRRT Treatment    <Continuous>  heparin   Injectable 5000 Unit(s) SubCutaneous every 8 hours  heparin  Infusion Syringe 300 Unit(s)/Hr (0.6 mL/Hr) CRRT <Continuous>  insulin lispro (ADMELOG) corrective regimen sliding scale   SubCutaneous every 6 hours  multivitamin/minerals/iron Oral Solution (CENTRUM) 15 milliLiter(s) Oral daily  pantoprazole  Injectable 40 milliGRAM(s) IV Push daily  Phoxillum Filtration BK 4 / 2.5 5000 milliLiter(s) (2400 mL/Hr) CRRT <Continuous>  Phoxillum Filtration BK 4 / 2.5 5000 milliLiter(s) (800 mL/Hr) CRRT <Continuous>  PrismaSOL Filtration BGK 4 / 2.5 5000 milliLiter(s) (200 mL/Hr) CRRT <Continuous>  thiamine 100 milliGRAM(s) Enteral Tube daily    MEDICATIONS  (PRN):  acetaminophen    Suspension .. 650 milliGRAM(s) Oral every 6 hours PRN Temp greater or equal to 38.5C (101.3F)      Social History: see consult    Family history: see consult    ROS:   ENT: all negative except as noted in HPI   Pulm: denies SOB, cough, hemoptysis  Neuro: denies numbness/tingling, loss of sensation  Endo: denies heat/cold intolerance, excessive sweating      Vital Signs Last 24 Hrs  T(C): 36.6 (20 Dec 2022 08:00), Max: 37.4 (20 Dec 2022 00:00)  T(F): 97.8 (20 Dec 2022 08:00), Max: 99.3 (20 Dec 2022 00:00)  HR: 104 (20 Dec 2022 08:56) (88 - 112)  BP: --  BP(mean): --  RR: 30 (20 Dec 2022 08:56) (15 - 36)  SpO2: 98% (20 Dec 2022 08:56) (95% - 100%)    Parameters below as of 20 Dec 2022 04:00  Patient On (Oxygen Delivery Method): ventilator  O2 Flow (L/min): 40                            9.5    12.77 )-----------( 102      ( 20 Dec 2022 00:52 )             31.2    12-20    136  |  102  |  34<H>  ----------------------------<  181<H>  4.6   |  21<L>  |  2.25<H>    Ca    8.0<L>      20 Dec 2022 00:52  Phos  4.1     12-20  Mg     3.0     12-20    TPro  7.0  /  Alb  3.0<L>  /  TBili  0.8  /  DBili  x   /  AST  49<H>  /  ALT  24  /  AlkPhos  113  12-20       PHYSICAL EXAM:  Gen: NAD, on vent  Skin: No rashes, bruises, or lesions  Head: Normocephalic, Atraumatic  Face: no edema, erythema, or fluctuance. Parotid glands soft without mass  Eyes: no scleral injection  Nose: Nares bilaterally patent, no discharge  Mouth: No Stridor / Drooling / Trismus.  Mucosa moist, tongue/uvula midline, oropharynx clear  Neck: #7 Proximal XLT Trach  secured with sutures x 4 and umbilical tie.  No hematoma or crepitus.  Flat, supple, no lymphadenopathy, trachea midline, no masses  Lymphatic: No lymphadenopathy  Resp: on vent   Neuro: facial nerve intact, no facial droop      Tracheoscope was performed and no blood was seen pooling from mainstem bronchi, no streaking of blood on wall of trachea, no trauma seen from suctionning. There was small amount of oozing from inferior stoma

## 2022-12-20 NOTE — CHART NOTE - NSCHARTNOTEFT_GEN_A_CORE
Nutrition Follow Up Note  Patient seen for: severe malnutrition follow up    Chart reviewed, events noted.  61 y/o male with PMH of CABG, DM, HTN, HLD presenting as transfer from [place] for c/f STEMI. Presented with ERIC  Leukocytosis, Thrombocytopenia, Anemia, acute pancreatitis.  Cardiogenic shock s/p VA ECMO decannulated ; Mitral regurgitation s/p Mitral clip x1 22; Acute respiratory distress/failure s/p Tracheostomy 22; IABP placed ( IABP dc'ed )         Source: [x] Patient       [x] EMR        [] RN        [x] Family at bedside: wife       [] Other:    -If unable to interview patient: [x] Trach/Vent/BiPAP  [] Disoriented/confused/inappropriate to interview    Diet Order:   Diet, NPO with Tube Feed:   Tube Feeding Modality: Nasogastric  Glucerna 1.2 Johnathon (GLUCERNARTH)  Total Volume for 24 Hours (mL): 1560  Continuous  Starting Tube Feed Rate {mL per Hour}: 30  Increase Tube Feed Rate by (mL): 10     Every hour  Until Goal Tube Feed Rate (mL per Hour): 65  Tube Feed Duration (in Hours): 24  Tube Feed Start Time: 14:25  No Carb Prosource (1pkg = 15gms Protein)     Qty per Day:  2 (22)  Current EN order provides, 1560mL, 1872kcal(1912kcal with prosource), 93.6g (104.6g with prosource)    - Is current order appropriate/adequate? [] Yes  []  No:     - PO intake :   NPO on tube feed    - Nutrition-related concerns:  - Intake: pt is currently receiving Glucerna 1.2 @ goal rate 65ml/hr x24hr.   - GI:  Reports good tolerance to tube feed. No N/V/diarrhea/constipation noted per wife at bedside. Per flowsheet, last bowel movement . No bowel regimen ordered. Notes protonic ordered.    - Pancreatitis ongoing; elevated lipase and amylase   - Renal: pt presented with ERIC and was treated with CRRT. elevated BUN 34 Cr 2.25  Na, K, Phos are within limit today.  - Endo: PMH of DM. a1c 7.8%. POCT 161-167mg/dL. Insulin sliding scale ordered.      Weights:   Daily Weight in k.7 (12-20), Weight in k.7 (12-19), Weight in k.7 (12-18), Weight in k.2 (12-16), Weight in k.6 (12-15), Weight in k.5 (12-14)  - weight is downtrending, suspect partial weight change related to fluid shift. Continue trend weight. Pt was on CRRT and edema is improving.      Nutritionally Pertinent MEDICATIONS  (STANDING):  aMIOdarone    Tablet  atorvastatin  insulin lispro (ADMELOG) corrective regimen sliding scale  multivitamin/minerals/iron Oral Solution (CENTRUM)  pantoprazole  Injectable  thiamine    Pertinent Labs:  @ 00:52: BUN 34<H>, Cr 2.25<H>, <H>, Mg 3.0<H>,   A1C with Estimated Average Glucose Result: 7.3 % (22 @ 03:58)    Finger Sticks:  POCT Blood Glucose.: 167 mg/dL ( @ 05:39)  POCT Blood Glucose.: 161 mg/dL ( @ 01:09)  POCT Blood Glucose.: 188 mg/dL ( @ 17:59)  POCT Blood Glucose.: 187 mg/dL ( @ 15:47)  POCT Blood Glucose.: 156 mg/dL ( @ 10:21)      Skin per nursing documentation: No pressure injury documented at this time.   Edema: Per flowsheet  generalized edema 1+.    Estimated Needs:   [] no change since previous assessment  [] recalculated:     Previous Nutrition Diagnosis: Severe Malnutrition & increased needs  Nutrition Diagnosis is: [x] ongoing  [] resolved [] not applicable   - addressed with EN feeding     New Nutrition Diagnosis: [x] Not applicable    Nutrition Care Plan:  [x] In Progress  [] Achieved  [] Not applicable    Nutrition Interventions:     Education Provided:       [] Yes:  [x] No: defer education at this time       Recommendations:         [] Continue current diet order            [] Add oral nutrition supplement:     [] Discontinue current diet order. Recommend:     Continue current micronutrient supplementation: multivitamin and thiamine  Continue to monitor nutritional intake, tolerance to diet prescription, weights, labs, skin integrity  RD remains available upon request and will follow up per protocol Nutrition Follow Up Note  Patient seen for: severe malnutrition follow up    Chart reviewed, events noted.  61 y/o male with PMH of CABG, DM, HTN, HLD presenting as transfer from [place] for c/f STEMI. Presented with ERIC  Leukocytosis, Thrombocytopenia, Anemia, acute pancreatitis.  Cardiogenic shock s/p VA ECMO decannulated ; Mitral regurgitation s/p Mitral clip x1 22; Acute respiratory distress/failure s/p Tracheostomy 22; IABP placed ( IABP dc'ed )         Source: [x] Patient       [x] EMR        [] RN        [x] Family at bedside: wife       [] Other:    -If unable to interview patient: [x] Trach/Vent/BiPAP  [] Disoriented/confused/inappropriate to interview    Diet Order:   Diet, NPO with Tube Feed:   Tube Feeding Modality: Nasogastric  Glucerna 1.2 Johnathon (GLUCERNARTH)  Total Volume for 24 Hours (mL): 1560  Continuous  Starting Tube Feed Rate {mL per Hour}: 30  Increase Tube Feed Rate by (mL): 10     Every hour  Until Goal Tube Feed Rate (mL per Hour): 65  Tube Feed Duration (in Hours): 24  Tube Feed Start Time: 14:25  No Carb Prosource (1pkg = 15gms Protein)     Qty per Day:  2 (22)  Current EN order provides, 1560mL, 1872kcal(1912kcal with prosource), 93.6g (104.6g with prosource)    - Is current order appropriate/adequate? [] Yes  []  No:     - PO intake :   NPO on tube feed    - Nutrition-related concerns:  - Intake: pt is currently receiving Glucerna 1.2 @ goal rate 65ml/hr x24hr.   - GI:  Reports good tolerance to tube feed. No N/V/diarrhea/constipation noted per wife at bedside. Per flowsheet, last bowel movement . No bowel regimen ordered. Notes protonic ordered.    - Pancreatitis ongoing; elevated lipase and amylase   - Renal: pt presented with ERIC and was treated with CRRT. elevated BUN 34 Cr 2.25  Na, K, Phos are within limit today.  - Endo: PMH of DM. a1c 7.8%. POCT 161-167mg/dL. Insulin sliding scale ordered.      Weights:   Daily Weight in k.7 (12-20), Weight in k.7 (12-19), Weight in k.7 (12-18), Weight in k.2 (12-16), Weight in k.6 (12-15), Weight in k.5 (12-14)  - weight is downtrending, suspect partial weight change related to fluid shift. Continue trend weight. Pt was on CRRT and edema is improving.      Nutritionally Pertinent MEDICATIONS  (STANDING):  aMIOdarone    Tablet  atorvastatin  insulin lispro (ADMELOG) corrective regimen sliding scale  multivitamin/minerals/iron Oral Solution (CENTRUM)  pantoprazole  Injectable  thiamine    Pertinent Labs:  @ 00:52: BUN 34<H>, Cr 2.25<H>, <H>, Mg 3.0<H>,   A1C with Estimated Average Glucose Result: 7.3 % (22 @ 03:58)    Finger Sticks:  POCT Blood Glucose.: 167 mg/dL ( @ 05:39)  POCT Blood Glucose.: 161 mg/dL ( @ 01:09)  POCT Blood Glucose.: 188 mg/dL ( @ 17:59)  POCT Blood Glucose.: 187 mg/dL ( @ 15:47)  POCT Blood Glucose.: 156 mg/dL ( @ 10:21)      Skin per nursing documentation: No pressure injury documented at this time.   Edema: Per flowsheet  generalized edema 1+.    Estimated Needs:   [] no change since previous assessment  [] recalculated:     Previous Nutrition Diagnosis: Severe Malnutrition & increased needs  Nutrition Diagnosis is: [x] ongoing  [] resolved [] not applicable   - addressed with EN feeding     New Nutrition Diagnosis: [x] Not applicable    Nutrition Care Plan:  [x] In Progress  [] Achieved  [] Not applicable    Nutrition Interventions:     Education Provided:       [] Yes:  [x] No: defer education at this time       Recommendations:         [] Continue current diet order            [] Add oral nutrition supplement:     [] Discontinue current diet order. Recommend:     Continue current micronutrient supplementation: multivitamin and thiamine  Continue to monitor nutritional intake, tolerance to diet prescription, weights, labs, skin integrity  RD remains available upon request and will follow up per protocol Nutrition Follow Up Note  Patient seen for: severe malnutrition follow up    Chart reviewed, events noted.  63 y/o male with PMH of CABG, DM, HTN, HLD presenting as transfer from [place] for c/f STEMI. Presented with ERIC  Leukocytosis, Thrombocytopenia, Anemia, acute pancreatitis.  Cardiogenic shock s/p VA ECMO decannulated ; Mitral regurgitation s/p Mitral clip x1 22; Acute respiratory distress/failure s/p Tracheostomy 22; IABP placed ( IABP dc'ed )         Source: [x] Patient       [x] EMR        [] RN        [x] Family at bedside: wife       [] Other:    -If unable to interview patient: [x] Trach/Vent/BiPAP  [] Disoriented/confused/inappropriate to interview    Diet Order:   Diet, NPO with Tube Feed:   Tube Feeding Modality: Nasogastric  Glucerna 1.2 Johnathon (GLUCERNARTH)  Total Volume for 24 Hours (mL): 1560  Continuous  Starting Tube Feed Rate {mL per Hour}: 30  Increase Tube Feed Rate by (mL): 10     Every hour  Until Goal Tube Feed Rate (mL per Hour): 65  Tube Feed Duration (in Hours): 24  Tube Feed Start Time: 14:25  No Carb Prosource (1pkg = 15gms Protein)     Qty per Day:  2 (22)  Current EN order provides, 1560mL, 1872kcal(1912kcal with prosource), 93.6g (104.6g with prosource)    - Is current order appropriate/adequate? [] Yes  []  No:     - PO intake :   NPO on tube feed    - Nutrition-related concerns:  - Intake: pt is currently receiving Glucerna 1.2 @ goal rate 65ml/hr x24hr.   - GI:  Reports good tolerance to tube feed. No N/V/diarrhea/constipation noted per wife at bedside. Per flowsheet, last bowel movement . No bowel regimen ordered. Notes protonic ordered.    - Pancreatitis ongoing; elevated lipase and amylase   - Renal: pt presented with ERIC and was treated with CRRT. elevated BUN 34 Cr 2.25  Na, K, Phos are within limit today.  - Endo: PMH of DM. a1c 7.8%. POCT 161-167mg/dL. Insulin sliding scale ordered.      Weights:   Daily Weight in k.7 (12-20), Weight in k.7 (12-19), Weight in k.7 (12-18), Weight in k.2 (12-16), Weight in k.6 (12-15), Weight in k.5 (12-14)  - weight is downtrending, suspect partial weight change related to fluid shift. Continue trend weight. Pt was on CRRT and edema is improving.      Nutritionally Pertinent MEDICATIONS  (STANDING):  aMIOdarone    Tablet  atorvastatin  insulin lispro (ADMELOG) corrective regimen sliding scale  multivitamin/minerals/iron Oral Solution (CENTRUM)  pantoprazole  Injectable  thiamine    Pertinent Labs:  @ 00:52: BUN 34<H>, Cr 2.25<H>, <H>, Mg 3.0<H>,   A1C with Estimated Average Glucose Result: 7.3 % (22 @ 03:58)    Finger Sticks:  POCT Blood Glucose.: 167 mg/dL ( @ 05:39)  POCT Blood Glucose.: 161 mg/dL ( @ 01:09)  POCT Blood Glucose.: 188 mg/dL ( @ 17:59)  POCT Blood Glucose.: 187 mg/dL ( @ 15:47)  POCT Blood Glucose.: 156 mg/dL ( @ 10:21)      Skin per nursing documentation: No pressure injury documented at this time.   Edema: Per flowsheet  generalized edema 1+.    Estimated Needs:   [] no change since previous assessment  [] recalculated:     Previous Nutrition Diagnosis: Severe Malnutrition & increased needs  Nutrition Diagnosis is: [x] ongoing  [] resolved [] not applicable   - addressed with EN feeding     New Nutrition Diagnosis: [x] Not applicable    Nutrition Care Plan:  [x] In Progress  [] Achieved  [] Not applicable    Nutrition Interventions:     Education Provided:       [] Yes:  [x] No: defer education at this time       Recommendations:         [] Continue current diet order            [] Add oral nutrition supplement:     [] Discontinue current diet order. Recommend:     Continue current micronutrient supplementation: multivitamin and thiamine  Continue to monitor nutritional intake, tolerance to diet prescription, weights, labs, skin integrity  RD remains available upon request and will follow up per protocol Nutrition Follow Up Note  Patient seen for: severe malnutrition follow up    Chart reviewed, events noted.  63 y/o male with PMH of CABG, DM, HTN, HLD presenting as transfer from [place] for c/f STEMI. Presented with ERIC  Leukocytosis, Thrombocytopenia, Anemia, acute pancreatitis.  Cardiogenic shock s/p VA ECMO decannulated ; Mitral regurgitation s/p Mitral clip x1 22; Acute respiratory distress/failure s/p Tracheostomy 22; IABP placed ( IABP dc'ed )         Source: [x] Patient       [x] EMR        [] RN        [x] Family at bedside: wife       [] Other:    -If unable to interview patient: [x] Trach/Vent/BiPAP  [] Disoriented/confused/inappropriate to interview    Diet Order:   Diet, NPO with Tube Feed:   Tube Feeding Modality: Nasogastric  Glucerna 1.2 Johnathon (GLUCERNARTH)  Total Volume for 24 Hours (mL): 1560  Continuous  Starting Tube Feed Rate {mL per Hour}: 30  Increase Tube Feed Rate by (mL): 10     Every hour  Until Goal Tube Feed Rate (mL per Hour): 65  Tube Feed Duration (in Hours): 24  Tube Feed Start Time: 14:25  No Carb Prosource (1pkg = 15gms Protein)     Qty per Day:  2 (22)  Current EN order provides, 1560mL, 1872kcal(1952kcal with prosource), 93.6g (115.6g with prosource)    - Is current order appropriate/adequate? [x] Yes  []  No:   EN provision:    1560mL 100%goal met    1560mL 100% goal met    1365mL 87.5% goal met   445mL 28.5% goal met    - PO intake :   NPO on tube feed    - Nutrition-related concerns:  - Pt currently on trach collar trial off ventilator   - Intake: pt is currently receiving Glucerna 1.2 @ goal rate 65ml/hr x24hr.   - GI:  Reports good tolerance to tube feed. No N/V/diarrhea/constipation noted per wife at bedside. Per flowsheet, last bowel movement . No bowel regimen ordered. Notes protonic ordered.    - Pancreatitis ongoing; elevated lipase and amylase   - Renal: pt presented with ERIC and was treated with CRRT. elevated BUN 34 Cr 2.25  Na, K, Phos are within limit today.  - Endo: PMH of DM. a1c 7.8%. POCT 161-167mg/dL. Insulin sliding scale ordered.      Weights:   Daily Weight in k.7 (-20), Weight in k.7 (-19), Weight in k.7 (-18), Weight in k.2 (12-16), Weight in k.6 (12-15), Weight in k.5 (12-14)  - weight is downtrending, suspect partial weight change related to fluid shift. Continue trend weight. Pt was on CRRT and edema is improving.      Nutritionally Pertinent MEDICATIONS  (STANDING):  aMIOdarone    Tablet  atorvastatin  insulin lispro (ADMELOG) corrective regimen sliding scale  multivitamin/minerals/iron Oral Solution (CENTRUM)  pantoprazole  Injectable  thiamine    Pertinent Labs:  @ 00:52: BUN 34<H>, Cr 2.25<H>, <H>, Mg 3.0<H>,   A1C with Estimated Average Glucose Result: 7.3 % (22 @ 03:58)    Finger Sticks:  POCT Blood Glucose.: 167 mg/dL ( @ 05:39)  POCT Blood Glucose.: 161 mg/dL ( @ 01:09)  POCT Blood Glucose.: 188 mg/dL ( @ 17:59)  POCT Blood Glucose.: 187 mg/dL ( @ 15:47)  POCT Blood Glucose.: 156 mg/dL ( @ 10:21)      Skin per nursing documentation: No pressure injury documented at this time.   Edema: Per flowsheet  generalized edema 1+.    Estimated Needs:   [x] recalculated:   Estimated Energy Needs: 1744 -2180kcal (20-25kcal/kg)  Estimated Protein Needs: 83.76-97.72g protein (1.2-1.4g/kg)  Defer fluids to team.   Needs based on lowest daily wt 87.2kg () for energy & IBW 69.8kg for protein.     Previous Nutrition Diagnosis: Severe Malnutrition & increased needs  Nutrition Diagnosis is: [x] ongoing  [] resolved [] not applicable   - addressed with EN feeding     New Nutrition Diagnosis: [x] Not applicable    Nutrition Care Plan:  [x] In Progress  [] Achieved  [] Not applicable    Nutrition Interventions:     Education Provided:       [] Yes:  [x] No: defer education at this time       Recommendations:      1. Continue current EN order Glucerna 1.2 @goal rate 65mL/hr x24hr, Discontinue Prosourse BID.   2. To provide 1560mL, 1872kcal (21.5kcal/kg, based on lowest daily weight 87.2kg), 93.6g (1.3g/kg, based on IBW 69.8kg)  3. Continue current micronutrient supplementation: multivitamin and thiamine  4. Continue to monitor nutritional intake, tolerance to diet prescription, weights, labs, skin integrity  5. RD remains available upon request and will follow up per protocol Nutrition Follow Up Note  Patient seen for: severe malnutrition follow up    Chart reviewed, events noted.  61 y/o male with PMH of CABG, DM, HTN, HLD presenting as transfer from [place] for c/f STEMI. Presented with ERIC  Leukocytosis, Thrombocytopenia, Anemia, acute pancreatitis.  Cardiogenic shock s/p VA ECMO decannulated ; Mitral regurgitation s/p Mitral clip x1 22; Acute respiratory distress/failure s/p Tracheostomy 22; IABP placed ( IABP dc'ed )         Source: [x] Patient       [x] EMR        [] RN        [x] Family at bedside: wife       [] Other:    -If unable to interview patient: [x] Trach/Vent/BiPAP  [] Disoriented/confused/inappropriate to interview    Diet Order:   Diet, NPO with Tube Feed:   Tube Feeding Modality: Nasogastric  Glucerna 1.2 Johnathon (GLUCERNARTH)  Total Volume for 24 Hours (mL): 1560  Continuous  Starting Tube Feed Rate {mL per Hour}: 30  Increase Tube Feed Rate by (mL): 10     Every hour  Until Goal Tube Feed Rate (mL per Hour): 65  Tube Feed Duration (in Hours): 24  Tube Feed Start Time: 14:25  No Carb Prosource (1pkg = 15gms Protein)     Qty per Day:  2 (22)  Current EN order provides, 1560mL, 1872kcal(1952kcal with prosource), 93.6g (115.6g with prosource)    - Is current order appropriate/adequate? [x] Yes  []  No:   EN provision:    1560mL 100%goal met    1560mL 100% goal met    1365mL 87.5% goal met   445mL 28.5% goal met    - PO intake :   NPO on tube feed    - Nutrition-related concerns:  - Pt currently on trach collar trial off ventilator   - Intake: pt is currently receiving Glucerna 1.2 @ goal rate 65ml/hr x24hr.   - GI:  Reports good tolerance to tube feed. No N/V/diarrhea/constipation noted per wife at bedside. Per flowsheet, last bowel movement . No bowel regimen ordered. Notes protonic ordered.    - Pancreatitis ongoing; elevated lipase and amylase   - Renal: pt presented with ERIC and was treated with CRRT. elevated BUN 34 Cr 2.25  Na, K, Phos are within limit today.  - Endo: PMH of DM. a1c 7.8%. POCT 161-167mg/dL. Insulin sliding scale ordered.      Weights:   Daily Weight in k.7 (-20), Weight in k.7 (-19), Weight in k.7 (-18), Weight in k.2 (12-16), Weight in k.6 (12-15), Weight in k.5 (12-14)  - weight is downtrending, suspect partial weight change related to fluid shift. Continue trend weight. Pt was on CRRT and edema is improving.      Nutritionally Pertinent MEDICATIONS  (STANDING):  aMIOdarone    Tablet  atorvastatin  insulin lispro (ADMELOG) corrective regimen sliding scale  multivitamin/minerals/iron Oral Solution (CENTRUM)  pantoprazole  Injectable  thiamine    Pertinent Labs:  @ 00:52: BUN 34<H>, Cr 2.25<H>, <H>, Mg 3.0<H>,   A1C with Estimated Average Glucose Result: 7.3 % (22 @ 03:58)    Finger Sticks:  POCT Blood Glucose.: 167 mg/dL ( @ 05:39)  POCT Blood Glucose.: 161 mg/dL ( @ 01:09)  POCT Blood Glucose.: 188 mg/dL ( @ 17:59)  POCT Blood Glucose.: 187 mg/dL ( @ 15:47)  POCT Blood Glucose.: 156 mg/dL ( @ 10:21)      Skin per nursing documentation: No pressure injury documented at this time.   Edema: Per flowsheet  generalized edema 1+.    Estimated Needs:   [x] recalculated:   Estimated Energy Needs: 1744 -2180kcal (20-25kcal/kg)  Estimated Protein Needs: 83.76-97.72g protein (1.2-1.4g/kg)  Defer fluids to team.   Needs based on lowest daily wt 87.2kg () for energy & IBW 69.8kg for protein.     Previous Nutrition Diagnosis: Severe Malnutrition & increased needs  Nutrition Diagnosis is: [x] ongoing  [] resolved [] not applicable   - addressed with EN feeding     New Nutrition Diagnosis: [x] Not applicable    Nutrition Care Plan:  [x] In Progress  [] Achieved  [] Not applicable    Nutrition Interventions:     Education Provided:       [] Yes:  [x] No: defer education at this time       Recommendations:      1. Continue current EN order Glucerna 1.2 @goal rate 65mL/hr x24hr, Discontinue Prosourse BID.   2. To provide 1560mL, 1872kcal (21.5kcal/kg, based on lowest daily weight 87.2kg), 93.6g (1.3g/kg, based on IBW 69.8kg)  3. Continue current micronutrient supplementation: multivitamin and thiamine  4. Continue to monitor nutritional intake, tolerance to diet prescription, weights, labs, skin integrity  5. RD remains available upon request and will follow up per protocol Nutrition Follow Up Note  Patient seen for: severe malnutrition follow up    Chart reviewed, events noted.  61 y/o male with PMH of CABG, DM, HTN, HLD presenting as transfer from [place] for c/f STEMI. Presented with ERIC  Leukocytosis, Thrombocytopenia, Anemia, acute pancreatitis.  Cardiogenic shock s/p VA ECMO decannulated ; Mitral regurgitation s/p Mitral clip x1 22; Acute respiratory distress/failure s/p Tracheostomy 22; IABP placed ( IABP dc'ed )         Source: [x] Patient       [x] EMR        [] RN        [x] Family at bedside: wife       [] Other:    -If unable to interview patient: [x] Trach/Vent/BiPAP  [] Disoriented/confused/inappropriate to interview    Diet Order:   Diet, NPO with Tube Feed:   Tube Feeding Modality: Nasogastric  Glucerna 1.2 Johnathon (GLUCERNARTH)  Total Volume for 24 Hours (mL): 1560  Continuous  Starting Tube Feed Rate {mL per Hour}: 30  Increase Tube Feed Rate by (mL): 10     Every hour  Until Goal Tube Feed Rate (mL per Hour): 65  Tube Feed Duration (in Hours): 24  Tube Feed Start Time: 14:25  No Carb Prosource (1pkg = 15gms Protein)     Qty per Day:  2 (22)  Current EN order provides, 1560mL, 1872kcal(1952kcal with prosource), 93.6g (115.6g with prosource)    - Is current order appropriate/adequate? [x] Yes  []  No:   EN provision:    1560mL 100%goal met    1560mL 100% goal met    1365mL 87.5% goal met   445mL 28.5% goal met    - PO intake :   NPO on tube feed    - Nutrition-related concerns:  - Pt currently on trach collar trial off ventilator   - Intake: pt is currently receiving Glucerna 1.2 @ goal rate 65ml/hr x24hr.   - GI:  Reports good tolerance to tube feed. No N/V/diarrhea/constipation noted per wife at bedside. Per flowsheet, last bowel movement . No bowel regimen ordered. Notes protonic ordered.    - Pancreatitis ongoing; elevated lipase and amylase   - Renal: pt presented with ERIC and was treated with CRRT. elevated BUN 34 Cr 2.25  Na, K, Phos are within limit today.  - Endo: PMH of DM. a1c 7.8%. POCT 161-167mg/dL. Insulin sliding scale ordered.      Weights:   Daily Weight in k.7 (-20), Weight in k.7 (-19), Weight in k.7 (-18), Weight in k.2 (12-16), Weight in k.6 (12-15), Weight in k.5 (12-14)  - weight is downtrending, suspect partial weight change related to fluid shift. Continue trend weight. Pt was on CRRT and edema is improving.      Nutritionally Pertinent MEDICATIONS  (STANDING):  aMIOdarone    Tablet  atorvastatin  insulin lispro (ADMELOG) corrective regimen sliding scale  multivitamin/minerals/iron Oral Solution (CENTRUM)  pantoprazole  Injectable  thiamine    Pertinent Labs:  @ 00:52: BUN 34<H>, Cr 2.25<H>, <H>, Mg 3.0<H>,   A1C with Estimated Average Glucose Result: 7.3 % (22 @ 03:58)    Finger Sticks:  POCT Blood Glucose.: 167 mg/dL ( @ 05:39)  POCT Blood Glucose.: 161 mg/dL ( @ 01:09)  POCT Blood Glucose.: 188 mg/dL ( @ 17:59)  POCT Blood Glucose.: 187 mg/dL ( @ 15:47)  POCT Blood Glucose.: 156 mg/dL ( @ 10:21)      Skin per nursing documentation: No pressure injury documented at this time.   Edema: Per flowsheet  generalized edema 1+.    Estimated Needs:   [x] recalculated:   Estimated Energy Needs: 1744 -2180kcal (20-25kcal/kg)  Estimated Protein Needs: 83.76-97.72g protein (1.2-1.4g/kg)  Defer fluids to team.   Needs based on lowest daily wt 87.2kg () for energy & IBW 69.8kg for protein.     Previous Nutrition Diagnosis: Severe Malnutrition & increased needs  Nutrition Diagnosis is: [x] ongoing  [] resolved [] not applicable   - addressed with EN feeding     New Nutrition Diagnosis: [x] Not applicable    Nutrition Care Plan:  [x] In Progress  [] Achieved  [] Not applicable    Nutrition Interventions:     Education Provided:       [] Yes:  [x] No: defer education at this time       Recommendations:      1. Continue current EN order Glucerna 1.2 @goal rate 65mL/hr x24hr, Discontinue Prosourse BID.   2. To provide 1560mL, 1872kcal (21.5kcal/kg, based on lowest daily weight 87.2kg), 93.6g (1.3g/kg, based on IBW 69.8kg)  3. Continue current micronutrient supplementation: multivitamin and thiamine  4. Continue to monitor nutritional intake, tolerance to diet prescription, weights, labs, skin integrity  5. RD remains available upon request and will follow up per protocol Nutrition Follow Up Note  Patient seen for: severe malnutrition follow up    Chart reviewed, events noted.  63 y/o male with PMH of CABG, DM, HTN, HLD presenting as transfer from [place] for c/f STEMI. Presented with ERIC  Leukocytosis, Thrombocytopenia, Anemia, acute pancreatitis.  Cardiogenic shock s/p VA ECMO decannulated ; Mitral regurgitation s/p Mitral clip x1 22; Acute respiratory distress/failure s/p Tracheostomy 22; IABP placed ( IABP dc'ed )         Source: [x] Patient       [x] EMR        [] RN        [x] Family at bedside: wife       [] Other:    -If unable to interview patient: [x] Trach/Vent/BiPAP  [] Disoriented/confused/inappropriate to interview    Diet Order:   Diet, NPO with Tube Feed:   Tube Feeding Modality: Nasogastric  Glucerna 1.2 Johnathon (GLUCERNARTH)  Total Volume for 24 Hours (mL): 1560  Continuous  Starting Tube Feed Rate {mL per Hour}: 30  Increase Tube Feed Rate by (mL): 10     Every hour  Until Goal Tube Feed Rate (mL per Hour): 65  Tube Feed Duration (in Hours): 24  Tube Feed Start Time: 14:25  No Carb Prosource (1pkg = 15gms Protein)     Qty per Day:  2 (22)  Current EN order provides, 1560mL, 1872kcal(1952kcal with prosource), 93.6g (115.6g with prosource)    - Is current order appropriate/adequate? [x] Yes  []  No:   EN provision:    1560mL 100%goal met    1560mL 100% goal met    1365mL 87.5% goal met   445mL 28.5% goal met    - PO intake :   NPO on tube feed    - Nutrition-related concerns:  - Pt currently on trach collar trial off ventilator   - Intake: pt is currently receiving Glucerna 1.2 @ goal rate 65ml/hr x24hr.   - GI:  Reports good tolerance to tube feed. No N/V/diarrhea/constipation noted per wife at bedside. Per flowsheet, last bowel movement . No bowel regimen ordered. Notes protonic ordered.    - Pancreatitis ongoing; elevated lipase and amylase   - Renal: pt presented with ERIC and was treated with CRRT. elevated BUN 34 Cr 2.25; Na, K, Phos are within limit today. Elevated Mg 3  - Endo: PMH of DM. a1c 7.8%. POCT 161-167mg/dL. Insulin sliding scale ordered.      Weights:   Daily Weight in k.7 (12-20), Weight in k.7 (-19), Weight in k.7 (-18), Weight in k.2 (12-16), Weight in k.6 (12-15), Weight in k.5 (12-14)  - weight is downtrending, suspect partial weight change related to fluid shift. Continue trend weight. Pt was on CRRT and edema is improving.      Nutritionally Pertinent MEDICATIONS  (STANDING):  aMIOdarone    Tablet  atorvastatin  insulin lispro (ADMELOG) corrective regimen sliding scale  multivitamin/minerals/iron Oral Solution (CENTRUM)  pantoprazole  Injectable  thiamine    Pertinent Labs:  @ 00:52: BUN 34<H>, Cr 2.25<H>, <H>, Mg 3.0<H>,   A1C with Estimated Average Glucose Result: 7.3 % (22 @ 03:58)    Finger Sticks:  POCT Blood Glucose.: 167 mg/dL ( @ 05:39)  POCT Blood Glucose.: 161 mg/dL ( @ 01:09)  POCT Blood Glucose.: 188 mg/dL ( @ 17:59)  POCT Blood Glucose.: 187 mg/dL ( @ 15:47)  POCT Blood Glucose.: 156 mg/dL ( @ 10:21)      Skin per nursing documentation: No pressure injury documented at this time.   Edema: Per flowsheet  generalized edema 1+.    Estimated Needs:   [x] recalculated:   Estimated Energy Needs: 1744 -2180kcal (20-25kcal/kg)  Estimated Protein Needs: 104-122g protein (1.2-1.4g/kg)  Defer fluids to team.   Needs based on lowest daily wt 87.2kg () for energy & protein due to pt is currently off ventilator     Previous Nutrition Diagnosis: Severe acute malnutrition & Increased nutrient needs  Nutrition Diagnosis is: [x] ongoing  [] resolved [] not applicable   - addressed with EN feeding     New Nutrition Diagnosis: [x] Not applicable    Nutrition Care Plan:  [x] In Progress  [] Achieved  [] Not applicable    Nutrition Interventions:     Education Provided:       [] Yes:  [x] No: defer education at this time       Recommendations:      1. Continue current EN order Glucerna 1.2 @goal rate 65mL/hr x24hr, and Prosourse BID.   2. To provide 1560mL, 1952kcal (22kcal/kg, based on lowest daily weight 87.2kg), 115.6g (1.3g/kg, based on lowest daily weight 87.2kg)  3. Continue current micronutrient supplementation: multivitamin and thiamine  4. Continue to monitor nutritional intake, tolerance to diet prescription, weights, labs, skin integrity  5. RD remains available upon request and will follow up per protocol Nutrition Follow Up Note  Patient seen for: severe malnutrition follow up    Chart reviewed, events noted.  61 y/o male with PMH of CABG, DM, HTN, HLD presenting as transfer from [place] for c/f STEMI. Presented with ERIC  Leukocytosis, Thrombocytopenia, Anemia, acute pancreatitis.  Cardiogenic shock s/p VA ECMO decannulated ; Mitral regurgitation s/p Mitral clip x1 22; Acute respiratory distress/failure s/p Tracheostomy 22; IABP placed ( IABP dc'ed )         Source: [x] Patient       [x] EMR        [] RN        [x] Family at bedside: wife       [] Other:    -If unable to interview patient: [x] Trach/Vent/BiPAP  [] Disoriented/confused/inappropriate to interview    Diet Order:   Diet, NPO with Tube Feed:   Tube Feeding Modality: Nasogastric  Glucerna 1.2 Johnathon (GLUCERNARTH)  Total Volume for 24 Hours (mL): 1560  Continuous  Starting Tube Feed Rate {mL per Hour}: 30  Increase Tube Feed Rate by (mL): 10     Every hour  Until Goal Tube Feed Rate (mL per Hour): 65  Tube Feed Duration (in Hours): 24  Tube Feed Start Time: 14:25  No Carb Prosource (1pkg = 15gms Protein)     Qty per Day:  2 (22)  Current EN order provides, 1560mL, 1872kcal(1952kcal with prosource), 93.6g (115.6g with prosource)    - Is current order appropriate/adequate? [x] Yes  []  No:   EN provision:    1560mL 100%goal met    1560mL 100% goal met    1365mL 87.5% goal met   445mL 28.5% goal met    - PO intake :   NPO on tube feed    - Nutrition-related concerns:  - Pt currently on trach collar trial off ventilator   - Intake: pt is currently receiving Glucerna 1.2 @ goal rate 65ml/hr x24hr.   - GI:  Reports good tolerance to tube feed. No N/V/diarrhea/constipation noted per wife at bedside. Per flowsheet, last bowel movement . No bowel regimen ordered. Notes protonic ordered.    - Pancreatitis ongoing; elevated lipase and amylase   - Renal: pt presented with ERIC and was treated with CRRT. elevated BUN 34 Cr 2.25; Na, K, Phos are within limit today. Elevated Mg 3  - Endo: PMH of DM. a1c 7.8%. POCT 161-167mg/dL. Insulin sliding scale ordered.      Weights:   Daily Weight in k.7 (12-20), Weight in k.7 (-19), Weight in k.7 (-18), Weight in k.2 (12-16), Weight in k.6 (12-15), Weight in k.5 (12-14)  - weight is downtrending, suspect partial weight change related to fluid shift. Continue trend weight. Pt was on CRRT and edema is improving.      Nutritionally Pertinent MEDICATIONS  (STANDING):  aMIOdarone    Tablet  atorvastatin  insulin lispro (ADMELOG) corrective regimen sliding scale  multivitamin/minerals/iron Oral Solution (CENTRUM)  pantoprazole  Injectable  thiamine    Pertinent Labs:  @ 00:52: BUN 34<H>, Cr 2.25<H>, <H>, Mg 3.0<H>,   A1C with Estimated Average Glucose Result: 7.3 % (22 @ 03:58)    Finger Sticks:  POCT Blood Glucose.: 167 mg/dL ( @ 05:39)  POCT Blood Glucose.: 161 mg/dL ( @ 01:09)  POCT Blood Glucose.: 188 mg/dL ( @ 17:59)  POCT Blood Glucose.: 187 mg/dL ( @ 15:47)  POCT Blood Glucose.: 156 mg/dL ( @ 10:21)      Skin per nursing documentation: No pressure injury documented at this time.   Edema: Per flowsheet  generalized edema 1+.    Estimated Needs:   [x] recalculated:   Estimated Energy Needs: 1744 -2180kcal (20-25kcal/kg)  Estimated Protein Needs: 104-122g protein (1.2-1.4g/kg)  Defer fluids to team.   Needs based on lowest daily wt 87.2kg () for energy & protein due to pt is currently off ventilator     Previous Nutrition Diagnosis: Severe acute malnutrition & Increased nutrient needs  Nutrition Diagnosis is: [x] ongoing  [] resolved [] not applicable   - addressed with EN feeding     New Nutrition Diagnosis: [x] Not applicable    Nutrition Care Plan:  [x] In Progress  [] Achieved  [] Not applicable    Nutrition Interventions:     Education Provided:       [] Yes:  [x] No: defer education at this time       Recommendations:      1. Continue current EN order Glucerna 1.2 @goal rate 65mL/hr x24hr, and Prosourse BID.   2. To provide 1560mL, 1952kcal (22kcal/kg, based on lowest daily weight 87.2kg), 115.6g (1.3g/kg, based on lowest daily weight 87.2kg)  3. Continue current micronutrient supplementation: multivitamin and thiamine  4. Continue to monitor nutritional intake, tolerance to diet prescription, weights, labs, skin integrity  5. RD remains available upon request and will follow up per protocol Nutrition Follow Up Note  Patient seen for: severe malnutrition follow up    Chart reviewed, events noted.  61 y/o male with PMH of CABG, DM, HTN, HLD presenting as transfer from [place] for c/f STEMI. Presented with ERIC  Leukocytosis, Thrombocytopenia, Anemia, acute pancreatitis.  Cardiogenic shock s/p VA ECMO decannulated ; Mitral regurgitation s/p Mitral clip x1 22; Acute respiratory distress/failure s/p Tracheostomy 22; IABP placed ( IABP dc'ed )         Source: [x] Patient       [x] EMR        [] RN        [x] Family at bedside: wife       [] Other:    -If unable to interview patient: [x] Trach/Vent/BiPAP  [] Disoriented/confused/inappropriate to interview    Diet Order:   Diet, NPO with Tube Feed:   Tube Feeding Modality: Nasogastric  Glucerna 1.2 Johnathon (GLUCERNARTH)  Total Volume for 24 Hours (mL): 1560  Continuous  Starting Tube Feed Rate {mL per Hour}: 30  Increase Tube Feed Rate by (mL): 10     Every hour  Until Goal Tube Feed Rate (mL per Hour): 65  Tube Feed Duration (in Hours): 24  Tube Feed Start Time: 14:25  No Carb Prosource (1pkg = 15gms Protein)     Qty per Day:  2 (22)  Current EN order provides, 1560mL, 1872kcal(1952kcal with prosource), 93.6g (115.6g with prosource)    - Is current order appropriate/adequate? [x] Yes  []  No:   EN provision:    1560mL 100%goal met    1560mL 100% goal met    1365mL 87.5% goal met   445mL 28.5% goal met    - PO intake :   NPO on tube feed    - Nutrition-related concerns:  - Pt currently on trach collar trial off ventilator   - Intake: pt is currently receiving Glucerna 1.2 @ goal rate 65ml/hr x24hr.   - GI:  Reports good tolerance to tube feed. No N/V/diarrhea/constipation noted per wife at bedside. Per flowsheet, last bowel movement . No bowel regimen ordered. Notes protonic ordered.    - Pancreatitis ongoing; elevated lipase and amylase   - Renal: pt presented with ERIC and was treated with CRRT. elevated BUN 34 Cr 2.25; Na, K, Phos are within limit today. Elevated Mg 3  - Endo: PMH of DM. a1c 7.8%. POCT 161-167mg/dL. Insulin sliding scale ordered.      Weights:   Daily Weight in k.7 (12-20), Weight in k.7 (-19), Weight in k.7 (-18), Weight in k.2 (12-16), Weight in k.6 (12-15), Weight in k.5 (12-14)  - weight is downtrending, suspect partial weight change related to fluid shift. Continue trend weight. Pt was on CRRT and edema is improving.      Nutritionally Pertinent MEDICATIONS  (STANDING):  aMIOdarone    Tablet  atorvastatin  insulin lispro (ADMELOG) corrective regimen sliding scale  multivitamin/minerals/iron Oral Solution (CENTRUM)  pantoprazole  Injectable  thiamine    Pertinent Labs:  @ 00:52: BUN 34<H>, Cr 2.25<H>, <H>, Mg 3.0<H>,   A1C with Estimated Average Glucose Result: 7.3 % (22 @ 03:58)    Finger Sticks:  POCT Blood Glucose.: 167 mg/dL ( @ 05:39)  POCT Blood Glucose.: 161 mg/dL ( @ 01:09)  POCT Blood Glucose.: 188 mg/dL ( @ 17:59)  POCT Blood Glucose.: 187 mg/dL ( @ 15:47)  POCT Blood Glucose.: 156 mg/dL ( @ 10:21)      Skin per nursing documentation: No pressure injury documented at this time.   Edema: Per flowsheet  generalized edema 1+.    Estimated Needs:   [x] recalculated:   Estimated Energy Needs: 1744 -2180kcal (20-25kcal/kg)  Estimated Protein Needs: 104-122g protein (1.2-1.4g/kg)  Defer fluids to team.   Needs based on lowest daily wt 87.2kg () for energy & protein due to pt is currently off ventilator     Previous Nutrition Diagnosis: Severe acute malnutrition & Increased nutrient needs  Nutrition Diagnosis is: [x] ongoing  [] resolved [] not applicable   - addressed with EN feeding     New Nutrition Diagnosis: [x] Not applicable    Nutrition Care Plan:  [x] In Progress  [] Achieved  [] Not applicable    Nutrition Interventions:     Education Provided:       [] Yes:  [x] No: defer education at this time       Recommendations:      1. Continue current EN order Glucerna 1.2 @goal rate 65mL/hr x24hr, and Prosourse BID.   2. To provide 1560mL, 1952kcal (22kcal/kg, based on lowest daily weight 87.2kg), 115.6g (1.3g/kg, based on lowest daily weight 87.2kg)  3. Continue current micronutrient supplementation: multivitamin and thiamine  4. Continue to monitor nutritional intake, tolerance to diet prescription, weights, labs, skin integrity  5. RD remains available upon request and will follow up per protocol Nutrition Follow Up Note  Patient seen for: severe malnutrition follow up    Chart reviewed, events noted.  61 y/o male with PMH of CABG, DM, HTN, HLD presenting as transfer from [place] for c/f STEMI. Presented with ERIC, Leukocytosis, Thrombocytopenia, Anemia, acute pancreatitis. Cardiogenic shock s/p VA ECMO decannulated ; Mitral regurgitation s/p Mitral clip x1 22; Acute respiratory distress/failure s/p Tracheostomy 22; IABP placed ( IABP dc'ed )         Source: [x] Patient       [x] EMR        [] RN        [x] Family at bedside: wife       [] Other:    -If unable to interview patient: [x] Trach/Vent/BiPAP  [] Disoriented/confused/inappropriate to interview    Diet Order:   Diet, NPO with Tube Feed:   Tube Feeding Modality: Nasogastric  Glucerna 1.2 Johnathon (GLUCERNARTH)  Total Volume for 24 Hours (mL): 1560  Continuous  Starting Tube Feed Rate {mL per Hour}: 30  Increase Tube Feed Rate by (mL): 10     Every hour  Until Goal Tube Feed Rate (mL per Hour): 65  Tube Feed Duration (in Hours): 24  Tube Feed Start Time: 14:25  No Carb Prosource (1pkg = 15gms Protein)     Qty per Day:  2 (22)  Current EN order provides, 1560mL, 1872kcal(1952kcal with prosource), 93.6g (115.6g with prosource)    - Is current order appropriate/adequate? [x] Yes  []  No:   EN provision:    1560mL 100%goal met    1560mL 100% goal met    1365mL 87.5% goal met   445mL 28.5% goal met    - PO intake :   NPO on tube feed    - Nutrition-related concerns:  - Pt currently on trach collar trial off ventilator   - Intake: pt is currently receiving Glucerna 1.2 @ goal rate 65ml/hr x24hr.   - GI:  Reports good tolerance to tube feed. No N/V/diarrhea/constipation noted per wife at bedside. Per flowsheet, last bowel movement . No bowel regimen ordered. Notes protonic ordered.    - Pancreatitis ongoing; elevated lipase and amylase   - Renal: pt presented with ERIC and was treated with CRRT. elevated BUN 34 Cr 2.25; Na, K, Phos are within limit today. Elevated Mg 3  - Endo: PMH of DM. a1c 7.8%. POCT 161-167mg/dL. Insulin sliding scale ordered.      Weights:   Daily Weight in k.7 (-20), Weight in k.7 (-19), Weight in k.7 (-18), Weight in k.2 (12-16), Weight in k.6 (12-15), Weight in k.5 (12-14)  - weight is downtrending, suspect partial weight change related to fluid shift. Continue trend weight. Pt was on CRRT and edema is improving.      Nutritionally Pertinent MEDICATIONS  (STANDING):  aMIOdarone    Tablet  atorvastatin  insulin lispro (ADMELOG) corrective regimen sliding scale  multivitamin/minerals/iron Oral Solution (CENTRUM)  pantoprazole  Injectable  thiamine    Pertinent Labs:  @ 00:52: BUN 34<H>, Cr 2.25<H>, <H>, Mg 3.0<H>,   A1C with Estimated Average Glucose Result: 7.3 % (22 @ 03:58)    Finger Sticks:  POCT Blood Glucose.: 167 mg/dL ( @ 05:39)  POCT Blood Glucose.: 161 mg/dL ( @ 01:09)  POCT Blood Glucose.: 188 mg/dL ( @ 17:59)  POCT Blood Glucose.: 187 mg/dL ( @ 15:47)  POCT Blood Glucose.: 156 mg/dL ( @ 10:21)      Skin per nursing documentation: No pressure injury documented at this time.   Edema: Per flowsheet  generalized edema 1+.    Estimated Needs:   [x] recalculated:   Estimated Energy Needs: 1744 -2180kcal (20-25kcal/kg)  Estimated Protein Needs: 104-122g protein (1.2-1.4g/kg)  Defer fluids to team.   Needs based on lowest daily wt 87.2kg () for energy & protein due to pt is currently off ventilator     Previous Nutrition Diagnosis: Severe acute malnutrition & Increased nutrient needs  Nutrition Diagnosis is: [x] ongoing  [] resolved [] not applicable   - addressed with EN feeding     New Nutrition Diagnosis: [x] Not applicable    Nutrition Care Plan:  [x] In Progress  [] Achieved  [] Not applicable    Nutrition Interventions:     Education Provided:       [] Yes:  [x] No: defer education at this time       Recommendations:      1. Continue current EN order Glucerna 1.2 @goal rate 65mL/hr x24hr, and Prosourse BID.   2. To provide 1560mL, 1952kcal (22kcal/kg, based on lowest daily weight 87.2kg), 115.6g (1.3g/kg, based on lowest daily weight 87.2kg)  3. Continue current micronutrient supplementation: multivitamin and thiamine  4. Continue to monitor nutritional intake, tolerance to diet prescription, weights, labs, skin integrity  5. RD remains available upon request and will follow up per protocol Nutrition Follow Up Note  Patient seen for: severe malnutrition follow up    Chart reviewed, events noted.  63 y/o male with PMH of CABG, DM, HTN, HLD presenting as transfer from [place] for c/f STEMI. Presented with ERIC, Leukocytosis, Thrombocytopenia, Anemia, acute pancreatitis. Cardiogenic shock s/p VA ECMO decannulated ; Mitral regurgitation s/p Mitral clip x1 22; Acute respiratory distress/failure s/p Tracheostomy 22; IABP placed ( IABP dc'ed )         Source: [x] Patient       [x] EMR        [] RN        [x] Family at bedside: wife       [] Other:    -If unable to interview patient: [x] Trach/Vent/BiPAP  [] Disoriented/confused/inappropriate to interview    Diet Order:   Diet, NPO with Tube Feed:   Tube Feeding Modality: Nasogastric  Glucerna 1.2 Johnathon (GLUCERNARTH)  Total Volume for 24 Hours (mL): 1560  Continuous  Starting Tube Feed Rate {mL per Hour}: 30  Increase Tube Feed Rate by (mL): 10     Every hour  Until Goal Tube Feed Rate (mL per Hour): 65  Tube Feed Duration (in Hours): 24  Tube Feed Start Time: 14:25  No Carb Prosource (1pkg = 15gms Protein)     Qty per Day:  2 (22)  Current EN order provides, 1560mL, 1872kcal(1952kcal with prosource), 93.6g (115.6g with prosource)    - Is current order appropriate/adequate? [x] Yes  []  No:   EN provision:    1560mL 100%goal met    1560mL 100% goal met    1365mL 87.5% goal met   445mL 28.5% goal met    - PO intake :   NPO on tube feed    - Nutrition-related concerns:  - Pt currently on trach collar trial off ventilator   - Intake: pt is currently receiving Glucerna 1.2 @ goal rate 65ml/hr x24hr.   - GI:  Reports good tolerance to tube feed. No N/V/diarrhea/constipation noted per wife at bedside. Per flowsheet, last bowel movement . No bowel regimen ordered. Notes protonic ordered.    - Pancreatitis ongoing; elevated lipase and amylase   - Renal: pt presented with ERIC and was treated with CRRT. elevated BUN 34 Cr 2.25; Na, K, Phos are within limit today. Elevated Mg 3  - Endo: PMH of DM. a1c 7.8%. POCT 161-167mg/dL. Insulin sliding scale ordered.      Weights:   Daily Weight in k.7 (-20), Weight in k.7 (-19), Weight in k.7 (-18), Weight in k.2 (12-16), Weight in k.6 (12-15), Weight in k.5 (12-14)  - weight is downtrending, suspect partial weight change related to fluid shift. Continue trend weight. Pt was on CRRT and edema is improving.      Nutritionally Pertinent MEDICATIONS  (STANDING):  aMIOdarone    Tablet  atorvastatin  insulin lispro (ADMELOG) corrective regimen sliding scale  multivitamin/minerals/iron Oral Solution (CENTRUM)  pantoprazole  Injectable  thiamine    Pertinent Labs:  @ 00:52: BUN 34<H>, Cr 2.25<H>, <H>, Mg 3.0<H>,   A1C with Estimated Average Glucose Result: 7.3 % (22 @ 03:58)    Finger Sticks:  POCT Blood Glucose.: 167 mg/dL ( @ 05:39)  POCT Blood Glucose.: 161 mg/dL ( @ 01:09)  POCT Blood Glucose.: 188 mg/dL ( @ 17:59)  POCT Blood Glucose.: 187 mg/dL ( @ 15:47)  POCT Blood Glucose.: 156 mg/dL ( @ 10:21)      Skin per nursing documentation: No pressure injury documented at this time.   Edema: Per flowsheet  generalized edema 1+.    Estimated Needs:   [x] recalculated:   Estimated Energy Needs: 1744 -2180kcal (20-25kcal/kg)  Estimated Protein Needs: 104-122g protein (1.2-1.4g/kg)  Defer fluids to team.   Needs based on lowest daily wt 87.2kg () for energy & protein due to pt is currently off ventilator     Previous Nutrition Diagnosis: Severe acute malnutrition & Increased nutrient needs  Nutrition Diagnosis is: [x] ongoing  [] resolved [] not applicable   - addressed with EN feeding     New Nutrition Diagnosis: [x] Not applicable    Nutrition Care Plan:  [x] In Progress  [] Achieved  [] Not applicable    Nutrition Interventions:     Education Provided:       [] Yes:  [x] No: defer education at this time       Recommendations:      1. Continue current EN order Glucerna 1.2 @goal rate 65mL/hr x24hr, and Prosourse BID.   2. To provide 1560mL, 1952kcal (22kcal/kg, based on lowest daily weight 87.2kg), 115.6g (1.3g/kg, based on lowest daily weight 87.2kg)  3. Continue current micronutrient supplementation: multivitamin and thiamine  4. Continue to monitor nutritional intake, tolerance to diet prescription, weights, labs, skin integrity  5. RD remains available upon request and will follow up per protocol Nutrition Follow Up Note  Patient seen for: severe malnutrition follow up    Chart reviewed, events noted.  63 y/o male with PMH of CABG, DM, HTN, HLD presenting as transfer from [place] for c/f STEMI. Presented with ERIC, Leukocytosis, Thrombocytopenia, Anemia, acute pancreatitis. Cardiogenic shock s/p VA ECMO decannulated ; Mitral regurgitation s/p Mitral clip x1 22; Acute respiratory distress/failure s/p Tracheostomy 22; IABP placed ( IABP dc'ed )         Source: [x] Patient       [x] EMR        [] RN        [x] Family at bedside: wife       [] Other:    -If unable to interview patient: [x] Trach/Vent/BiPAP  [] Disoriented/confused/inappropriate to interview    Diet Order:   Diet, NPO with Tube Feed:   Tube Feeding Modality: Nasogastric  Glucerna 1.2 Johnathon (GLUCERNARTH)  Total Volume for 24 Hours (mL): 1560  Continuous  Starting Tube Feed Rate {mL per Hour}: 30  Increase Tube Feed Rate by (mL): 10     Every hour  Until Goal Tube Feed Rate (mL per Hour): 65  Tube Feed Duration (in Hours): 24  Tube Feed Start Time: 14:25  No Carb Prosource (1pkg = 15gms Protein)     Qty per Day:  2 (22)  Current EN order provides, 1560mL, 1872kcal(1952kcal with prosource), 93.6g (115.6g with prosource)    - Is current order appropriate/adequate? [x] Yes  []  No:   EN provision:    1560mL 100%goal met    1560mL 100% goal met    1365mL 87.5% goal met   445mL 28.5% goal met    - PO intake :   NPO on tube feed    - Nutrition-related concerns:  - Pt currently on trach collar trial off ventilator   - Intake: pt is currently receiving Glucerna 1.2 @ goal rate 65ml/hr x24hr.   - GI:  Reports good tolerance to tube feed. No N/V/diarrhea/constipation noted per wife at bedside. Per flowsheet, last bowel movement . No bowel regimen ordered. Notes protonic ordered.    - Pancreatitis ongoing; elevated lipase and amylase   - Renal: pt presented with ERIC and was treated with CRRT. elevated BUN 34 Cr 2.25; Na, K, Phos are within limit today. Elevated Mg 3  - Endo: PMH of DM. a1c 7.8%. POCT 161-167mg/dL. Insulin sliding scale ordered.      Weights:   Daily Weight in k.7 (-20), Weight in k.7 (-19), Weight in k.7 (-18), Weight in k.2 (12-16), Weight in k.6 (12-15), Weight in k.5 (12-14)  - weight is downtrending, suspect partial weight change related to fluid shift. Continue trend weight. Pt was on CRRT and edema is improving.      Nutritionally Pertinent MEDICATIONS  (STANDING):  aMIOdarone    Tablet  atorvastatin  insulin lispro (ADMELOG) corrective regimen sliding scale  multivitamin/minerals/iron Oral Solution (CENTRUM)  pantoprazole  Injectable  thiamine    Pertinent Labs:  @ 00:52: BUN 34<H>, Cr 2.25<H>, <H>, Mg 3.0<H>,   A1C with Estimated Average Glucose Result: 7.3 % (22 @ 03:58)    Finger Sticks:  POCT Blood Glucose.: 167 mg/dL ( @ 05:39)  POCT Blood Glucose.: 161 mg/dL ( @ 01:09)  POCT Blood Glucose.: 188 mg/dL ( @ 17:59)  POCT Blood Glucose.: 187 mg/dL ( @ 15:47)  POCT Blood Glucose.: 156 mg/dL ( @ 10:21)      Skin per nursing documentation: No pressure injury documented at this time.   Edema: Per flowsheet  generalized edema 1+.    Estimated Needs:   [x] recalculated:   Estimated Energy Needs: 1744 -2180kcal (20-25kcal/kg)  Estimated Protein Needs: 104-122g protein (1.2-1.4g/kg)  Defer fluids to team.   Needs based on lowest daily wt 87.2kg () for energy & protein due to pt is currently off ventilator     Previous Nutrition Diagnosis: Severe acute malnutrition & Increased nutrient needs  Nutrition Diagnosis is: [x] ongoing  [] resolved [] not applicable   - addressed with EN feeding     New Nutrition Diagnosis: [x] Not applicable    Nutrition Care Plan:  [x] In Progress  [] Achieved  [] Not applicable    Nutrition Interventions:     Education Provided:       [] Yes:  [x] No: defer education at this time       Recommendations:      1. Continue current EN order Glucerna 1.2 @goal rate 65mL/hr x24hr, and Prosourse BID.   2. To provide 1560mL, 1952kcal (22kcal/kg, based on lowest daily weight 87.2kg), 115.6g (1.3g/kg, based on lowest daily weight 87.2kg)  3. Continue current micronutrient supplementation: multivitamin and thiamine  4. Continue to monitor nutritional intake, tolerance to diet prescription, weights, labs, skin integrity  5. RD remains available upon request and will follow up per protocol Nutrition Follow Up Note  Patient seen for: severe malnutrition follow up    Chart reviewed, events noted.  63 y/o male with PMH of CABG, DM, HTN, HLD presenting as transfer from [place] for c/f STEMI. Presented with ERIC, Leukocytosis, Thrombocytopenia, Anemia, acute pancreatitis. Cardiogenic shock s/p VA ECMO decannulated ; Mitral regurgitation s/p Mitral clip x1 22; Acute respiratory distress/failure s/p Tracheostomy 22; IABP placed ( IABP dc'ed )         Source: [x] Patient       [x] EMR        [] RN        [x] Family at bedside: wife       [] Other:    -If unable to interview patient: [] Trach/Vent/BiPAP  [] Disoriented/confused/inappropriate to interview    Diet Order:   Diet, NPO with Tube Feed:   Tube Feeding Modality: Nasogastric  Glucerna 1.2 Johnathon (GLUCERNARTH)  Total Volume for 24 Hours (mL): 1560  Continuous  Starting Tube Feed Rate {mL per Hour}: 30  Increase Tube Feed Rate by (mL): 10     Every hour  Until Goal Tube Feed Rate (mL per Hour): 65  Tube Feed Duration (in Hours): 24  Tube Feed Start Time: 14:25  No Carb Prosource (1pkg = 15gms Protein)     Qty per Day:  2 (22)  Current EN order provides, 1560mL, 1872kcal(1952kcal with prosource), 93.6g (115.6g with prosource), 1256mL free water    - Is current order appropriate/adequate? [x] Yes  []  No:   EN provision:    1560mL Glucerna 1.2 100%goal met    1560mL Glucerna 1.2 100% goal met    1365mL Glucerna 1.2 87.5% goal met   445mL Glucerna 1.2 28.5% goal met   1040mL Glucerna 1.2 66.6% goal met  12/15 1560mL Glucerna 1.2 100% goal met   1100mL Glucerna 1.2 & 90mL vital 1.5    795mL Glucerna 1.2 50.9% goal met        - PO intake :   NPO on tube feed    - Nutrition-related concerns:  - Pt currently on trach collar trial off ventilator   - Intake: pt is currently receiving Glucerna 1.2 @ goal rate 65ml/hr x24hr.   - GI:  Reports good tolerance to tube feed. No N/V/diarrhea/constipation noted per wife at bedside. Per flowsheet, last bowel movement . No bowel regimen ordered. Notes protonic ordered.    - Pancreatitis ongoing; elevated lipase and amylase   - Renal: pt presented with ERIC and was treated with CRRT. Plan for HD today. elevated BUN 34 Cr 2.25; Na, K, Phos are within limit today. Elevated Mg 3  - Endo: PMH of DM. a1c 7.3%. POCT 161-167mg/dL. Insulin sliding scale ordered.      Weights:   Daily Weight in k.7 (-20), Weight in k.7 (-19), Weight in k.7 (-18), Weight in k.2 (12-16), Weight in k.6 (12-15), Weight in k.5 (12-14)  - weight is downtrending, suspect partial weight change related to fluid shift. Continue trend weight. Pt was on CRRT and edema is improving.    Drug Dosing Weight  Height (cm): 172.7 (16 Dec 2022 16:25)  Weight (kg): 92.9 (16 Dec 2022 16:25)  BMI (kg/m2): 31.1 (16 Dec 2022 16:25)  BSA (m2): 2.06 (16 Dec 2022 16:25)    Nutritionally Pertinent MEDICATIONS  (STANDING):  aMIOdarone    Tablet  atorvastatin  insulin lispro (ADMELOG) corrective regimen sliding scale  multivitamin/minerals/iron Oral Solution (CENTRUM)  pantoprazole  Injectable  thiamine    Pertinent Labs:  @ 00:52: BUN 34<H>, Cr 2.25<H>, <H>, Mg 3.0<H>,   A1C with Estimated Average Glucose Result: 7.3 % (22 @ 03:58)    Finger Sticks:  POCT Blood Glucose.: 167 mg/dL ( @ 05:39)  POCT Blood Glucose.: 161 mg/dL ( @ 01:09)  POCT Blood Glucose.: 188 mg/dL ( @ 17:59)  POCT Blood Glucose.: 187 mg/dL ( @ 15:47)  POCT Blood Glucose.: 156 mg/dL ( @ 10:21)      Skin per nursing documentation: No pressure injury documented at this time.   Edema: Per flowsheet  generalized edema 1+.    Estimated Needs:   [x] recalculated:   Estimated Energy Needs: 1744 -2180kcal (20-25kcal/kg)  Estimated Protein Needs: 104-122g protein (1.2-1.4g/kg)  Defer fluids to team.   Needs based on lowest daily wt 87.2kg () for energy & protein due to pt is currently off ventilator     Previous Nutrition Diagnosis: Severe acute malnutrition & Increased nutrient needs  Nutrition Diagnosis is: [x] ongoing  [] resolved [] not applicable   - addressed with EN feeding     New Nutrition Diagnosis: [x] Not applicable    Nutrition Care Plan:  [x] In Progress  [] Achieved  [] Not applicable    Nutrition Interventions:     Education Provided:       [] Yes:  [x] No: defer education at this time       Recommendations:      1. Continue current EN order Glucerna 1.2 @goal rate 65mL/hr x24hr, and Prosourse BID. To provide 1560mL, 1952kcal (22kcal/kg, based on lowest daily weight 87.2kg), 115.6g (1.3g/kg, based on lowest daily weight 87.2kg), 1256mL free water  2. Considering ongoing pancreatitis, recommend lower fat EN formula if current order is not tolerated. Consider Jevity 1.5 @goal rate 55mL x24hr +2 prosource, to provide 1320ml, 2060kcal (23.6/kg, based on lowest daily weight 87.2kg), 107g protein (1.2g/kg, based on lowest daily weight 87.2kg), 1003mL free water  3. Continue current micronutrient supplementation: multivitamin and thiamine  4. Continue to monitor nutritional intake, tolerance to diet prescription, weights, labs, skin integrity  5. RD remains available upon request and will follow up per protocol Nutrition Follow Up Note  Patient seen for: severe malnutrition follow up    Chart reviewed, events noted.  63 y/o male with PMH of CABG, DM, HTN, HLD presenting as transfer from [place] for c/f STEMI. Presented with ERIC, Leukocytosis, Thrombocytopenia, Anemia, acute pancreatitis. Cardiogenic shock s/p VA ECMO decannulated ; Mitral regurgitation s/p Mitral clip x1 22; Acute respiratory distress/failure s/p Tracheostomy 22; IABP placed ( IABP dc'ed )         Source: [x] Patient       [x] EMR        [] RN        [x] Family at bedside: wife       [] Other:    -If unable to interview patient: [] Trach/Vent/BiPAP  [] Disoriented/confused/inappropriate to interview    Diet Order:   Diet, NPO with Tube Feed:   Tube Feeding Modality: Nasogastric  Glucerna 1.2 John[place] (GLUCERNARTH)  Total Volume for 24 Hours (mL): 1560  Continuous  Starting Tube Feed Rate {mL per Hour}: 30  Increase Tube Feed Rate by (mL): 10     Every hour  Until Goal Tube Feed Rate (mL per Hour): 65  Tube Feed Duration (in Hours): 24  Tube Feed Start Time: 14:25  No Carb Prosource (1pkg = 15gms Protein)     Qty per Day:  2 (22)  Current EN order provides, 1560mL, 1872kcal(1952kcal with prosource), 93.6g (115.6g with prosource), 1256mL free water    - Is current order appropriate/adequate? [x] Yes  []  No:   EN provision:    1560mL Glucerna 1.2 100%goal met    1560mL Glucerna 1.2 100% goal met    1365mL Glucerna 1.2 87.5% goal met   445mL Glucerna 1.2 28.5% goal met   1040mL Glucerna 1.2 66.6% goal met  12/15 1560mL Glucerna 1.2 100% goal met   1100mL Glucerna 1.2 & 90mL vital 1.5    795mL Glucerna 1.2 50.9% goal met        - PO intake :   NPO on tube feed    - Nutrition-related concerns:  - Pt currently on trach collar trial off ventilator   - Intake: pt is currently receiving Glucerna 1.2 @ goal rate 65ml/hr x24hr.   - GI:  Reports good tolerance to tube feed. No N/V/diarrhea/constipation noted per wife at bedside. Per flowsheet, last bowel movement . No bowel regimen ordered. Notes protonic ordered.    - Pancreatitis ongoing; elevated lipase and amylase   - Renal: pt presented with ERIC and was treated with CRRT. Plan for HD today. elevated BUN 34 Cr 2.25; Na, K, Phos are within limit today. Elevated Mg 3  - Endo: PMH of DM. a1c 7.3%. POCT 161-167mg/dL. Insulin sliding scale ordered.      Weights:   Daily Weight in k.7 (-20), Weight in k.7 (-19), Weight in k.7 (-18), Weight in k.2 (12-16), Weight in k.6 (12-15), Weight in k.5 (12-14)  - weight is downtrending, suspect partial weight change related to fluid shift. Continue trend weight. Pt was on CRRT and edema is improving.    Drug Dosing Weight  Height (cm): 172.7 (16 Dec 2022 16:25)  Weight (kg): 92.9 (16 Dec 2022 16:25)  BMI (kg/m2): 31.1 (16 Dec 2022 16:25)  BSA (m2): 2.06 (16 Dec 2022 16:25)    Nutritionally Pertinent MEDICATIONS  (STANDING):  aMIOdarone    Tablet  atorvastatin  insulin lispro (ADMELOG) corrective regimen sliding scale  multivitamin/minerals/iron Oral Solution (CENTRUM)  pantoprazole  Injectable  thiamine    Pertinent Labs:  @ 00:52: BUN 34<H>, Cr 2.25<H>, <H>, Mg 3.0<H>,   A1C with Estimated Average Glucose Result: 7.3 % (22 @ 03:58)    Finger Sticks:  POCT Blood Glucose.: 167 mg/dL ( @ 05:39)  POCT Blood Glucose.: 161 mg/dL ( @ 01:09)  POCT Blood Glucose.: 188 mg/dL ( @ 17:59)  POCT Blood Glucose.: 187 mg/dL ( @ 15:47)  POCT Blood Glucose.: 156 mg/dL ( @ 10:21)      Skin per nursing documentation: No pressure injury documented at this time.   Edema: Per flowsheet  generalized edema 1+.    Estimated Needs:   [x] recalculated:   Estimated Energy Needs: 1744 -2180kcal (20-25kcal/kg)  Estimated Protein Needs: 104-122g protein (1.2-1.4g/kg)  Defer fluids to team.   Needs based on lowest daily wt 87.2kg () for energy & protein due to pt is currently off ventilator     Previous Nutrition Diagnosis: Severe acute malnutrition & Increased nutrient needs  Nutrition Diagnosis is: [x] ongoing  [] resolved [] not applicable   - addressed with EN feeding     New Nutrition Diagnosis: [x] Not applicable    Nutrition Care Plan:  [x] In Progress  [] Achieved  [] Not applicable    Nutrition Interventions:     Education Provided:       [] Yes:  [x] No: defer education at this time       Recommendations:      1. Continue current EN order Glucerna 1.2 @goal rate 65mL/hr x24hr, and Prosourse BID. To provide 1560mL, 1952kcal (22kcal/kg, based on lowest daily weight 87.2kg), 115.6g (1.3g/kg, based on lowest daily weight 87.2kg), 1256mL free water  2. Considering ongoing pancreatitis, recommend lower fat EN formula if current order is not tolerated. Consider Jevity 1.5 @goal rate 55mL x24hr +2 prosource, to provide 1320ml, 2060kcal (23.6/kg, based on lowest daily weight 87.2kg), 107g protein (1.2g/kg, based on lowest daily weight 87.2kg), 1003mL free water  3. Continue current micronutrient supplementation: multivitamin and thiamine  4. Continue to monitor nutritional intake, tolerance to diet prescription, weights, labs, skin integrity  5. RD remains available upon request and will follow up per protocol Nutrition Follow Up Note  Patient seen for: severe malnutrition follow up    Chart reviewed, events noted.  63 y/o male with PMH of CABG, DM, HTN, HLD presenting as transfer from [place] for c/f STEMI. Presented with ERIC, Leukocytosis, Thrombocytopenia, Anemia, acute pancreatitis. Cardiogenic shock s/p VA ECMO decannulated ; Mitral regurgitation s/p Mitral clip x1 22; Acute respiratory distress/failure s/p Tracheostomy 22; IABP placed ( IABP dc'ed )         Source: [x] Patient       [x] EMR        [] RN        [x] Family at bedside: wife       [] Other:    -If unable to interview patient: [] Trach/Vent/BiPAP  [] Disoriented/confused/inappropriate to interview    Diet Order:   Diet, NPO with Tube Feed:   Tube Feeding Modality: Nasogastric  Glucerna 1.2 John[place] (GLUCERNARTH)  Total Volume for 24 Hours (mL): 1560  Continuous  Starting Tube Feed Rate {mL per Hour}: 30  Increase Tube Feed Rate by (mL): 10     Every hour  Until Goal Tube Feed Rate (mL per Hour): 65  Tube Feed Duration (in Hours): 24  Tube Feed Start Time: 14:25  No Carb Prosource (1pkg = 15gms Protein)     Qty per Day:  2 (22)  Current EN order provides, 1560mL, 1872kcal(1952kcal with prosource), 93.6g (115.6g with prosource), 1256mL free water    - Is current order appropriate/adequate? [x] Yes  []  No:   EN provision:    1560mL Glucerna 1.2 100%goal met    1560mL Glucerna 1.2 100% goal met    1365mL Glucerna 1.2 87.5% goal met   445mL Glucerna 1.2 28.5% goal met   1040mL Glucerna 1.2 66.6% goal met  12/15 1560mL Glucerna 1.2 100% goal met   1100mL Glucerna 1.2 & 90mL vital 1.5    795mL Glucerna 1.2 50.9% goal met        - PO intake :   NPO on tube feed    - Nutrition-related concerns:  - Pt currently on trach collar trial off ventilator   - Intake: pt is currently receiving Glucerna 1.2 @ goal rate 65ml/hr x24hr.   - GI:  Reports good tolerance to tube feed. No N/V/diarrhea/constipation noted per wife at bedside. Per flowsheet, last bowel movement . No bowel regimen ordered. Notes protonic ordered.    - Pancreatitis ongoing; elevated lipase and amylase   - Renal: pt presented with ERIC and was treated with CRRT. Plan for HD today. elevated BUN 34 Cr 2.25; Na, K, Phos are within limit today. Elevated Mg 3  - Endo: PMH of DM. a1c 7.3%. POCT 161-167mg/dL. Insulin sliding scale ordered.      Weights:   Daily Weight in k.7 (-20), Weight in k.7 (-19), Weight in k.7 (-18), Weight in k.2 (12-16), Weight in k.6 (12-15), Weight in k.5 (12-14)  - weight is downtrending, suspect partial weight change related to fluid shift. Continue trend weight. Pt was on CRRT and edema is improving.    Drug Dosing Weight  Height (cm): 172.7 (16 Dec 2022 16:25)  Weight (kg): 92.9 (16 Dec 2022 16:25)  BMI (kg/m2): 31.1 (16 Dec 2022 16:25)  BSA (m2): 2.06 (16 Dec 2022 16:25)    Nutritionally Pertinent MEDICATIONS  (STANDING):  aMIOdarone    Tablet  atorvastatin  insulin lispro (ADMELOG) corrective regimen sliding scale  multivitamin/minerals/iron Oral Solution (CENTRUM)  pantoprazole  Injectable  thiamine    Pertinent Labs:  @ 00:52: BUN 34<H>, Cr 2.25<H>, <H>, Mg 3.0<H>,   A1C with Estimated Average Glucose Result: 7.3 % (22 @ 03:58)    Finger Sticks:  POCT Blood Glucose.: 167 mg/dL ( @ 05:39)  POCT Blood Glucose.: 161 mg/dL ( @ 01:09)  POCT Blood Glucose.: 188 mg/dL ( @ 17:59)  POCT Blood Glucose.: 187 mg/dL ( @ 15:47)  POCT Blood Glucose.: 156 mg/dL ( @ 10:21)      Skin per nursing documentation: No pressure injury documented at this time.   Edema: Per flowsheet  generalized edema 1+.    Estimated Needs:   [x] recalculated:   Estimated Energy Needs: 1744 -2180kcal (20-25kcal/kg)  Estimated Protein Needs: 104-122g protein (1.2-1.4g/kg)  Defer fluids to team.   Needs based on lowest daily wt 87.2kg () for energy & protein due to pt is currently off ventilator     Previous Nutrition Diagnosis: Severe acute malnutrition & Increased nutrient needs  Nutrition Diagnosis is: [x] ongoing  [] resolved [] not applicable   - addressed with EN feeding     New Nutrition Diagnosis: [x] Not applicable    Nutrition Care Plan:  [x] In Progress  [] Achieved  [] Not applicable    Nutrition Interventions:     Education Provided:       [] Yes:  [x] No: defer education at this time       Recommendations:      1. Continue current EN order Glucerna 1.2 @goal rate 65mL/hr x24hr, and Prosourse BID. To provide 1560mL, 1952kcal (22kcal/kg, based on lowest daily weight 87.2kg), 115.6g (1.3g/kg, based on lowest daily weight 87.2kg), 1256mL free water  2. Considering ongoing pancreatitis, recommend lower fat EN formula if current order is not tolerated. Consider Jevity 1.5 @goal rate 55mL x24hr +2 prosource, to provide 1320ml, 2060kcal (23.6/kg, based on lowest daily weight 87.2kg), 107g protein (1.2g/kg, based on lowest daily weight 87.2kg), 1003mL free water  3. Continue current micronutrient supplementation: multivitamin and thiamine  4. Continue to monitor nutritional intake, tolerance to diet prescription, weights, labs, skin integrity  5. RD remains available upon request and will follow up per protocol Nutrition Follow Up Note  Patient seen for: severe malnutrition follow up    Chart reviewed, events noted.  63 y/o male with PMH of CABG, DM, HTN, HLD presenting as transfer from [place] for c/f STEMI. Presented with ERIC, Leukocytosis, Thrombocytopenia, Anemia, acute pancreatitis. Cardiogenic shock s/p VA ECMO decannulated ; Mitral regurgitation s/p Mitral clip x1 22; Acute respiratory distress/failure s/p Tracheostomy 22; IABP placed ( IABP dc'ed )         Source: [x] Patient       [x] EMR        [] RN        [x] Family at bedside: wife       [] Other:    -If unable to interview patient: [] Trach/Vent/BiPAP  [] Disoriented/confused/inappropriate to interview    Diet Order:   Diet, NPO with Tube Feed:   Tube Feeding Modality: Nasogastric  Glucerna 1.2 John[place] (GLUCERNARTH)  Total Volume for 24 Hours (mL): 1560  Continuous  Starting Tube Feed Rate {mL per Hour}: 30  Increase Tube Feed Rate by (mL): 10     Every hour  Until Goal Tube Feed Rate (mL per Hour): 65  Tube Feed Duration (in Hours): 24  Tube Feed Start Time: 14:25  No Carb Prosource (1pkg = 15gms Protein)     Qty per Day:  2 (22)  Current EN order provides, 1560mL, 1872kcal(1952kcal with prosource), 93.6g (115.6g with prosource), 1256mL free water    - Is current order appropriate/adequate? [x] Yes  []  No:   EN provision:    1560mL Glucerna 1.2 100%goal met    1560mL Glucerna 1.2 100% goal met    1365mL Glucerna 1.2 87.5% goal met   445mL Glucerna 1.2 28.5% goal met   1040mL Glucerna 1.2 66.6% goal met  12/15 1560mL Glucerna 1.2 100% goal met   1100mL Glucerna 1.2 & 90mL vital 1.5    795mL Glucerna 1.2 50.9% goal met        - PO intake :   NPO on tube feed    - Nutrition-related concerns:  - Pt currently on trach collar trial off ventilator   - Intake: pt is currently receiving Glucerna 1.2 @ goal rate 65ml/hr x24hr.   - GI:  Reports good tolerance to tube feed. No N/V/diarrhea/constipation noted per wife at bedside. Per flowsheet, last bowel movement . No bowel regimen ordered. Notes protonic ordered.    - Pancreatitis ongoing; elevated lipase and amylase   - Renal: pt presented with ERIC and was treated with CRRT. Plan for HD today. elevated BUN 34 Cr 2.25; Na, K, Phos are within limit today. Elevated Mg 3  - Endo: PMH of DM. a1c 7.3%. POCT 161-167mg/dL. Insulin sliding scale ordered.      Weights:   Daily Weight in k.7 (-20), Weight in k.7 (-19), Weight in k.7 (-18), Weight in k.2 (12-16), Weight in k.6 (12-15), Weight in k.5 (12-14)  - weight is downtrending, suspect partial weight change related to fluid shift. Continue trend weight. Pt was on CRRT and edema is improving.    Drug Dosing Weight  Height (cm): 172.7 (16 Dec 2022 16:25)  Weight (kg): 92.9 (16 Dec 2022 16:25)  BMI (kg/m2): 31.1 (16 Dec 2022 16:25)  BSA (m2): 2.06 (16 Dec 2022 16:25)    Nutritionally Pertinent MEDICATIONS  (STANDING):  aMIOdarone    Tablet  atorvastatin  insulin lispro (ADMELOG) corrective regimen sliding scale  multivitamin/minerals/iron Oral Solution (CENTRUM)  pantoprazole  Injectable  thiamine    Pertinent Labs:  @ 00:52: BUN 34<H>, Cr 2.25<H>, <H>, Mg 3.0<H>,   A1C with Estimated Average Glucose Result: 7.3 % (22 @ 03:58)    Finger Sticks:  POCT Blood Glucose.: 167 mg/dL ( @ 05:39)  POCT Blood Glucose.: 161 mg/dL ( @ 01:09)  POCT Blood Glucose.: 188 mg/dL ( @ 17:59)  POCT Blood Glucose.: 187 mg/dL ( @ 15:47)  POCT Blood Glucose.: 156 mg/dL ( @ 10:21)      Skin per nursing documentation: No pressure injury documented at this time.   Edema: Per flowsheet  generalized edema 1+.    Estimated Needs:   [x] recalculated:   Estimated Energy Needs: 2000-2442kcal (23-28kcal/kg)  Estimated Protein Needs: 104-122g protein (1.2-1.4g/kg)  Defer fluids to team.   Needs based on lowest daily wt 87.2kg () for energy & protein due to pt is currently off ventilator     Previous Nutrition Diagnosis: Severe acute malnutrition & Increased nutrient needs  Nutrition Diagnosis is: [x] ongoing  [] resolved [] not applicable   - addressed with EN feeding     New Nutrition Diagnosis: [x] Not applicable    Nutrition Care Plan:  [x] In Progress  [] Achieved  [] Not applicable    Nutrition Interventions:     Education Provided:       [] Yes:  [x] No: defer education at this time       Recommendations:      1. Continue current EN order Glucerna 1.2 @goal rate 65mL/hr x24hr, and Prosourse BID. To provide 1560mL, 1952kcal (22kcal/kg, based on lowest daily weight 87.2kg), 115.6g (1.3g/kg, based on lowest daily weight 87.2kg), 1256mL free water  2. Considering ongoing pancreatitis, recommend lower fat EN formula if current order is not tolerated. RD remains available for EN adjustment  3. Continue current micronutrient supplementation: multivitamin and thiamine  4. Continue to monitor nutritional intake, tolerance to diet prescription, weights, labs, skin integrity  5. RD remains available upon request and will follow up per protocol Nutrition Follow Up Note  Patient seen for: severe malnutrition follow up    Chart reviewed, events noted.  61 y/o male with PMH of CABG, DM, HTN, HLD presenting as transfer from [place] for c/f STEMI. Presented with ERIC, Leukocytosis, Thrombocytopenia, Anemia, acute pancreatitis. Cardiogenic shock s/p VA ECMO decannulated ; Mitral regurgitation s/p Mitral clip x1 22; Acute respiratory distress/failure s/p Tracheostomy 22; IABP placed ( IABP dc'ed )         Source: [x] Patient       [x] EMR        [] RN        [x] Family at bedside: wife       [] Other:    -If unable to interview patient: [] Trach/Vent/BiPAP  [] Disoriented/confused/inappropriate to interview    Diet Order:   Diet, NPO with Tube Feed:   Tube Feeding Modality: Nasogastric  Glucerna 1.2 John[place] (GLUCERNARTH)  Total Volume for 24 Hours (mL): 1560  Continuous  Starting Tube Feed Rate {mL per Hour}: 30  Increase Tube Feed Rate by (mL): 10     Every hour  Until Goal Tube Feed Rate (mL per Hour): 65  Tube Feed Duration (in Hours): 24  Tube Feed Start Time: 14:25  No Carb Prosource (1pkg = 15gms Protein)     Qty per Day:  2 (22)  Current EN order provides, 1560mL, 1872kcal(1952kcal with prosource), 93.6g (115.6g with prosource), 1256mL free water    - Is current order appropriate/adequate? [x] Yes  []  No:   EN provision:    1560mL Glucerna 1.2 100%goal met    1560mL Glucerna 1.2 100% goal met    1365mL Glucerna 1.2 87.5% goal met   445mL Glucerna 1.2 28.5% goal met   1040mL Glucerna 1.2 66.6% goal met  12/15 1560mL Glucerna 1.2 100% goal met   1100mL Glucerna 1.2 & 90mL vital 1.5    795mL Glucerna 1.2 50.9% goal met        - PO intake :   NPO on tube feed    - Nutrition-related concerns:  - Pt currently on trach collar trial off ventilator   - Intake: pt is currently receiving Glucerna 1.2 @ goal rate 65ml/hr x24hr.   - GI:  Reports good tolerance to tube feed. No N/V/diarrhea/constipation noted per wife at bedside. Per flowsheet, last bowel movement . No bowel regimen ordered. Notes protonic ordered.    - Pancreatitis ongoing; elevated lipase and amylase   - Renal: pt presented with ERIC and was treated with CRRT. Plan for HD today. elevated BUN 34 Cr 2.25; Na, K, Phos are within limit today. Elevated Mg 3  - Endo: PMH of DM. a1c 7.3%. POCT 161-167mg/dL. Insulin sliding scale ordered.      Weights:   Daily Weight in k.7 (-20), Weight in k.7 (-19), Weight in k.7 (-18), Weight in k.2 (12-16), Weight in k.6 (12-15), Weight in k.5 (12-14)  - weight is downtrending, suspect partial weight change related to fluid shift. Continue trend weight. Pt was on CRRT and edema is improving.    Drug Dosing Weight  Height (cm): 172.7 (16 Dec 2022 16:25)  Weight (kg): 92.9 (16 Dec 2022 16:25)  BMI (kg/m2): 31.1 (16 Dec 2022 16:25)  BSA (m2): 2.06 (16 Dec 2022 16:25)    Nutritionally Pertinent MEDICATIONS  (STANDING):  aMIOdarone    Tablet  atorvastatin  insulin lispro (ADMELOG) corrective regimen sliding scale  multivitamin/minerals/iron Oral Solution (CENTRUM)  pantoprazole  Injectable  thiamine    Pertinent Labs:  @ 00:52: BUN 34<H>, Cr 2.25<H>, <H>, Mg 3.0<H>,   A1C with Estimated Average Glucose Result: 7.3 % (22 @ 03:58)    Finger Sticks:  POCT Blood Glucose.: 167 mg/dL ( @ 05:39)  POCT Blood Glucose.: 161 mg/dL ( @ 01:09)  POCT Blood Glucose.: 188 mg/dL ( @ 17:59)  POCT Blood Glucose.: 187 mg/dL ( @ 15:47)  POCT Blood Glucose.: 156 mg/dL ( @ 10:21)      Skin per nursing documentation: No pressure injury documented at this time.   Edema: Per flowsheet  generalized edema 1+.    Estimated Needs:   [x] recalculated:   Estimated Energy Needs: 2000-2442kcal (23-28kcal/kg)  Estimated Protein Needs: 104-122g protein (1.2-1.4g/kg)  Defer fluids to team.   Needs based on lowest daily wt 87.2kg () for energy & protein due to pt is currently off ventilator     Previous Nutrition Diagnosis: Severe acute malnutrition & Increased nutrient needs  Nutrition Diagnosis is: [x] ongoing  [] resolved [] not applicable   - addressed with EN feeding     New Nutrition Diagnosis: [x] Not applicable    Nutrition Care Plan:  [x] In Progress  [] Achieved  [] Not applicable    Nutrition Interventions:     Education Provided:       [] Yes:  [x] No: defer education at this time       Recommendations:      1. Continue current EN order Glucerna 1.2 @goal rate 65mL/hr x24hr, and Prosourse BID. To provide 1560mL, 1952kcal (22kcal/kg, based on lowest daily weight 87.2kg), 115.6g (1.3g/kg, based on lowest daily weight 87.2kg), 1256mL free water  2. Considering ongoing pancreatitis, recommend lower fat EN formula if current order is not tolerated. RD remains available for EN adjustment  3. Continue current micronutrient supplementation: multivitamin and thiamine  4. Continue to monitor nutritional intake, tolerance to diet prescription, weights, labs, skin integrity  5. RD remains available upon request and will follow up per protocol Nutrition Follow Up Note  Patient seen for: severe malnutrition follow up    Chart reviewed, events noted.  63 y/o male with PMH of CABG, DM, HTN, HLD presenting as transfer from [place] for c/f STEMI. Presented with ERIC, Leukocytosis, Thrombocytopenia, Anemia, acute pancreatitis. Cardiogenic shock s/p [facility] ECMO decannulated ; Mitral regurgitation s/p Mitral clip x1 22; Acute respiratory distress/failure s/p Tracheostomy 22; IABP placed ( IABP dc'ed )         Source: [x] Patient       [x] EMR        [] RN        [x] Family at bedside: wife       [] Other:    -If unable to interview patient: [] Trach/Vent/BiPAP  [] Disoriented/confused/inappropriate to interview    Diet Order:   Diet, NPO with Tube Feed:   Tube Feeding Modality: Nasogastric  Glucerna 1.2 John[place] (GLUCERNARTH)  Total Volume for 24 Hours (mL): 1560  Continuous  Starting Tube Feed Rate {mL per Hour}: 30  Increase Tube Feed Rate by (mL): 10     Every hour  Until Goal Tube Feed Rate (mL per Hour): 65  Tube Feed Duration (in Hours): 24  Tube Feed Start Time: 14:25  No Carb Prosource (1pkg = 15gms Protein)     Qty per Day:  2 (22)  Current EN order provides, 1560mL, 1872kcal(1952kcal with prosource), 93.6g (115.6g with prosource), 1256mL free water    - Is current order appropriate/adequate? [x] Yes  []  No:   EN provision:    1560mL Glucerna 1.2 100%goal met    1560mL Glucerna 1.2 100% goal met    1365mL Glucerna 1.2 87.5% goal met   445mL Glucerna 1.2 28.5% goal met   1040mL Glucerna 1.2 66.6% goal met  12/15 1560mL Glucerna 1.2 100% goal met   1100mL Glucerna 1.2 & 90mL vital 1.5    795mL Glucerna 1.2 50.9% goal met        - PO intake :   NPO on tube feed    - Nutrition-related concerns:  - Pt currently on trach collar trial off ventilator   - Intake: pt is currently receiving Glucerna 1.2 @ goal rate 65ml/hr x24hr.   - GI:  Reports good tolerance to tube feed. No N/V/diarrhea/constipation noted per wife at bedside. Per flowsheet, last bowel movement . No bowel regimen ordered. Notes protonic ordered.    - Pancreatitis ongoing; elevated lipase and amylase   - Renal: pt presented with ERIC and was treated with CRRT. Plan for HD today. elevated BUN 34 Cr 2.25; Na, K, Phos are within limit today. Elevated Mg 3  - Endo: PMH of DM. a1c 7.3%. POCT 161-167mg/dL. Insulin sliding scale ordered.      Weights:   Daily Weight in k.7 (-20), Weight in k.7 (-19), Weight in k.7 (-18), Weight in k.2 (12-16), Weight in k.6 (12-15), Weight in k.5 (12-14)  - weight is downtrending, suspect partial weight change related to fluid shift. Continue trend weight. Pt was on CRRT and edema is improving.    Drug Dosing Weight  Height (cm): 172.7 (16 Dec 2022 16:25)  Weight (kg): 92.9 (16 Dec 2022 16:25)  BMI (kg/m2): 31.1 (16 Dec 2022 16:25)  BSA (m2): 2.06 (16 Dec 2022 16:25)    Nutritionally Pertinent MEDICATIONS  (STANDING):  aMIOdarone    Tablet  atorvastatin  insulin lispro (ADMELOG) corrective regimen sliding scale  multivitamin/minerals/iron Oral Solution (CENTRUM)  pantoprazole  Injectable  thiamine    Pertinent Labs:  @ 00:52: BUN 34<H>, Cr 2.25<H>, <H>, Mg 3.0<H>,   A1C with Estimated Average Glucose Result: 7.3 % (22 @ 03:58)    Finger Sticks:  POCT Blood Glucose.: 167 mg/dL ( @ 05:39)  POCT Blood Glucose.: 161 mg/dL ( @ 01:09)  POCT Blood Glucose.: 188 mg/dL ( @ 17:59)  POCT Blood Glucose.: 187 mg/dL ( @ 15:47)  POCT Blood Glucose.: 156 mg/dL ( @ 10:21)      Skin per nursing documentation: No pressure injury documented at this time.   Edema: Per flowsheet  generalized edema 1+.    Estimated Needs:   [x] recalculated:   Estimated Energy Needs: 2000-2442kcal (23-28kcal/kg)  Estimated Protein Needs: 104-122g protein (1.2-1.4g/kg)  Defer fluids to team.   Needs based on lowest daily wt 87.2kg () for energy & protein due to pt is currently off ventilator     Previous Nutrition Diagnosis: Severe acute malnutrition & Increased nutrient needs  Nutrition Diagnosis is: [x] ongoing  [] resolved [] not applicable   - addressed with EN feeding     New Nutrition Diagnosis: [x] Not applicable    Nutrition Care Plan:  [x] In Progress  [] Achieved  [] Not applicable    Nutrition Interventions:     Education Provided:       [] Yes:  [x] No: defer education at this time       Recommendations:      1. Continue current EN order Glucerna 1.2 @goal rate 65mL/hr x24hr, and Prosourse BID. To provide 1560mL, 1952kcal (22kcal/kg, based on lowest daily weight 87.2kg), 115.6g (1.3g/kg, based on lowest daily weight 87.2kg), 1256mL free water  2. Considering ongoing pancreatitis, recommend lower fat EN formula if current order is not tolerated. RD remains available for EN adjustment  3. Continue current micronutrient supplementation: multivitamin and thiamine  4. Continue to monitor nutritional intake, tolerance to diet prescription, weights, labs, skin integrity  5. RD remains available upon request and will follow up per protocol Nutrition Follow Up Note  Patient seen for: severe malnutrition follow up    Chart reviewed, events noted.  61 y/o male with PMH of CABG, DM, HTN, HLD presenting as transfer from [place] for c/f STEMI. Presented with ERIC, Leukocytosis, Thrombocytopenia, Anemia, acute pancreatitis. Cardiogenic shock s/p [facility] ECMO decannulated ; Mitral regurgitation s/p Mitral clip x1 22; Acute respiratory distress/failure s/p Tracheostomy 22; IABP placed ( IABP dc'ed )         Source: [x] Patient       [x] EMR        [] RN        [x] Family at bedside: wife       [] Other:    -If unable to interview patient: [] Trach/Vent/BiPAP  [] Disoriented/confused/inappropriate to interview    Diet Order:   Diet, NPO with Tube Feed:   Tube Feeding Modality: Nasogastric  Glucerna 1.2 [place] (GLUCERNARTH)  Total Volume for 24 Hours (mL): 1560  Continuous  Starting Tube Feed Rate {mL per Hour}: 30  Increase Tube Feed Rate by (mL): 10     Every hour  Until Goal Tube Feed Rate (mL per Hour): 65  Tube Feed Duration (in Hours): 24  Tube Feed Start Time: 14:25  No Carb Prosource (1pkg = 15gms Protein)     Qty per Day:  2 (22)  Current EN order provides, 1560mL, 1872kcal(1952kcal with prosource), 93.6g (115.6g with prosource), 1256mL free water    - Is current order appropriate/adequate? [x] Yes  []  No:   EN provision:    1560mL Glucerna 1.2 100%goal met    1560mL Glucerna 1.2 100% goal met    1365mL Glucerna 1.2 87.5% goal met   445mL Glucerna 1.2 28.5% goal met   1040mL Glucerna 1.2 66.6% goal met  12/15 1560mL Glucerna 1.2 100% goal met   1100mL Glucerna 1.2 & 90mL vital AF    795mL Glucerna 1.2 50.9% goal met        - PO intake :   NPO on tube feed    - Nutrition-related concerns:  - Pt currently on trach collar trial off ventilator   - Intake: pt is currently receiving Glucerna 1.2 @ goal rate 65ml/hr x24hr.   - GI:  Reports good tolerance to tube feed. No N/V/diarrhea/constipation noted per wife at bedside. Per flowsheet, last bowel movement . No bowel regimen ordered. Notes protonic ordered.    - Pancreatitis ongoing; elevated lipase and amylase   - Renal: pt presented with ERIC and was treated with CRRT. Plan for HD today. elevated BUN 34 Cr 2.25; Na, K, Phos are within limit today. Elevated Mg 3  - Endo: PMH of DM. a1c 7.3%. POCT 161-167mg/dL. Insulin sliding scale ordered.      Weights:   Daily Weight in k.7 (-20), Weight in k.7 (-19), Weight in k.7 (-18), Weight in k.2 (12-16), Weight in k.6 (12-15), Weight in k.5 (12-14)  - weight is downtrending, suspect partial weight change related to fluid shift. Continue trend weight. Pt was on CRRT and edema is improving.    Drug Dosing Weight  Height (cm): 172.7 (16 Dec 2022 16:25)  Weight (kg): 92.9 (16 Dec 2022 16:25)  BMI (kg/m2): 31.1 (16 Dec 2022 16:25)  BSA (m2): 2.06 (16 Dec 2022 16:25)    Nutritionally Pertinent MEDICATIONS  (STANDING):  aMIOdarone    Tablet  atorvastatin  insulin lispro (ADMELOG) corrective regimen sliding scale  multivitamin/minerals/iron Oral Solution (CENTRUM)  pantoprazole  Injectable  thiamine    Pertinent Labs:  @ 00:52: BUN 34<H>, Cr 2.25<H>, <H>, Mg 3.0<H>,   A1C with Estimated Average Glucose Result: 7.3 % (22 @ 03:58)    Finger Sticks:  POCT Blood Glucose.: 167 mg/dL ( @ 05:39)  POCT Blood Glucose.: 161 mg/dL ( @ 01:09)  POCT Blood Glucose.: 188 mg/dL ( @ 17:59)  POCT Blood Glucose.: 187 mg/dL ( @ 15:47)  POCT Blood Glucose.: 156 mg/dL ( @ 10:21)      Skin per nursing documentation: No pressure injury documented at this time.   Edema: Per flowsheet  generalized edema 1+.    Estimated Needs:   [x] recalculated:   Estimated Energy Needs: 2000-2442kcal (23-28kcal/kg)  Estimated Protein Needs: 104-122g protein (1.2-1.4g/kg)  Defer fluids to team.   Needs based on lowest daily wt 87.2kg () for energy & protein due to pt is currently off ventilator     Previous Nutrition Diagnosis: Severe acute malnutrition & Increased nutrient needs  Nutrition Diagnosis is: [x] ongoing  [] resolved [] not applicable   - addressed with EN feeding     New Nutrition Diagnosis: [x] Not applicable    Nutrition Care Plan:  [x] In Progress  [] Achieved  [] Not applicable    Nutrition Interventions:     Education Provided:       [] Yes:  [x] No: defer education at this time       Recommendations:      1. Continue current EN order Glucerna 1.2 @goal rate 65mL/hr x24hr, and Prosourse BID. To provide 1560mL, 1952kcal (22kcal/kg, based on lowest daily weight 87.2kg), 115.6g (1.3g/kg, based on lowest daily weight 87.2kg), 1256mL free water  2. Considering ongoing pancreatitis, recommend lower fat EN formula if current order is not tolerated. RD remains available for EN adjustment  3. Continue current micronutrient supplementation: multivitamin and thiamine  4. Continue to monitor nutritional intake, tolerance to diet prescription, weights, labs, skin integrity  5. RD remains available upon request and will follow up per protocol Nutrition Follow Up Note  Patient seen for: severe malnutrition follow up    Chart reviewed, events noted.  61 y/o male with PMH of CABG, DM, HTN, HLD presenting as transfer from [place] for c/f STEMI. Presented with ERIC, Leukocytosis, Thrombocytopenia, Anemia, acute pancreatitis. Cardiogenic shock s/p [facility] ECMO decannulated ; Mitral regurgitation s/p Mitral clip x1 22; Acute respiratory distress/failure s/p Tracheostomy 22; IABP placed ( IABP dc'ed )         Source: [x] Patient       [x] EMR        [] RN        [x] Family at bedside: wife       [] Other:    -If unable to interview patient: [] Trach/Vent/BiPAP  [] Disoriented/confused/inappropriate to interview    Diet Order:   Diet, NPO with Tube Feed:   Tube Feeding Modality: Nasogastric  Glucerna 1.2 [place] (GLUCERNARTH)  Total Volume for 24 Hours (mL): 1560  Continuous  Starting Tube Feed Rate {mL per Hour}: 30  Increase Tube Feed Rate by (mL): 10     Every hour  Until Goal Tube Feed Rate (mL per Hour): 65  Tube Feed Duration (in Hours): 24  Tube Feed Start Time: 14:25  No Carb Prosource (1pkg = 15gms Protein)     Qty per Day:  2 (22)  Current EN order provides, 1560mL, 1872kcal(1952kcal with prosource), 93.6g (115.6g with prosource), 1256mL free water    - Is current order appropriate/adequate? [x] Yes  []  No:   EN provision:    1560mL Glucerna 1.2 100%goal met    1560mL Glucerna 1.2 100% goal met    1365mL Glucerna 1.2 87.5% goal met   445mL Glucerna 1.2 28.5% goal met   1040mL Glucerna 1.2 66.6% goal met  12/15 1560mL Glucerna 1.2 100% goal met   1100mL Glucerna 1.2 & 90mL vital AF    795mL Glucerna 1.2 50.9% goal met        - PO intake :   NPO on tube feed    - Nutrition-related concerns:  - Pt currently on trach collar trial off ventilator   - Intake: pt is currently receiving Glucerna 1.2 @ goal rate 65ml/hr x24hr.   - GI:  Reports good tolerance to tube feed. No N/V/diarrhea/constipation noted per wife at bedside. Per flowsheet, last bowel movement . No bowel regimen ordered. Notes protonic ordered.    - Pancreatitis ongoing; elevated lipase and amylase   - Renal: pt presented with ERIC and was treated with CRRT. Plan for HD today. elevated BUN 34 Cr 2.25; Na, K, Phos are within limit today. Elevated Mg 3  - Endo: PMH of DM. a1c 7.3%. POCT 161-167mg/dL. Insulin sliding scale ordered.      Weights:   Daily Weight in k.7 (-20), Weight in k.7 (-19), Weight in k.7 (-18), Weight in k.2 (12-16), Weight in k.6 (12-15), Weight in k.5 (12-14)  - weight is downtrending, suspect partial weight change related to fluid shift. Continue trend weight. Pt was on CRRT and edema is improving.    Drug Dosing Weight  Height (cm): 172.7 (16 Dec 2022 16:25)  Weight (kg): 92.9 (16 Dec 2022 16:25)  BMI (kg/m2): 31.1 (16 Dec 2022 16:25)  BSA (m2): 2.06 (16 Dec 2022 16:25)    Nutritionally Pertinent MEDICATIONS  (STANDING):  aMIOdarone    Tablet  atorvastatin  insulin lispro (ADMELOG) corrective regimen sliding scale  multivitamin/minerals/iron Oral Solution (CENTRUM)  pantoprazole  Injectable  thiamine    Pertinent Labs:  @ 00:52: BUN 34<H>, Cr 2.25<H>, <H>, Mg 3.0<H>,   A1C with Estimated Average Glucose Result: 7.3 % (22 @ 03:58)    Finger Sticks:  POCT Blood Glucose.: 167 mg/dL ( @ 05:39)  POCT Blood Glucose.: 161 mg/dL ( @ 01:09)  POCT Blood Glucose.: 188 mg/dL ( @ 17:59)  POCT Blood Glucose.: 187 mg/dL ( @ 15:47)  POCT Blood Glucose.: 156 mg/dL ( @ 10:21)      Skin per nursing documentation: No pressure injury documented at this time.   Edema: Per flowsheet  generalized edema 1+.    Estimated Needs:   [x] recalculated:   Estimated Energy Needs: 2000-2442kcal (23-28kcal/kg)  Estimated Protein Needs: 104-122g protein (1.2-1.4g/kg)  Defer fluids to team.   Needs based on lowest daily wt 87.2kg () for energy & protein due to pt is currently off ventilator     Previous Nutrition Diagnosis: Severe acute malnutrition & Increased nutrient needs  Nutrition Diagnosis is: [x] ongoing  [] resolved [] not applicable   - addressed with EN feeding     New Nutrition Diagnosis: [x] Not applicable    Nutrition Care Plan:  [x] In Progress  [] Achieved  [] Not applicable    Nutrition Interventions:     Education Provided:       [] Yes:  [x] No: defer education at this time       Recommendations:      1. Continue current EN order Glucerna 1.2 @goal rate 65mL/hr x24hr, and Prosourse BID. To provide 1560mL, 1952kcal (22kcal/kg, based on lowest daily weight 87.2kg), 115.6g (1.3g/kg, based on lowest daily weight 87.2kg), 1256mL free water  2. Considering ongoing pancreatitis, recommend lower fat EN formula if current order is not tolerated. RD remains available for EN adjustment  3. Continue current micronutrient supplementation: multivitamin and thiamine  4. Continue to monitor nutritional intake, tolerance to diet prescription, weights, labs, skin integrity  5. RD remains available upon request and will follow up per protocol Nutrition Follow Up Note  Patient seen for: severe malnutrition follow up    Chart reviewed, events noted.  63 y/o male with PMH of CABG, DM, HTN, HLD presenting as transfer from [place] for c/f STEMI. Presented with ERIC, Leukocytosis, Thrombocytopenia, Anemia, acute pancreatitis. Cardiogenic shock s/p VA ECMO decannulated ; Mitral regurgitation s/p Mitral clip x1 22; Acute respiratory distress/failure s/p Tracheostomy 22; IABP placed ( IABP dc'ed )         Source: [x] Patient       [x] EMR        [] RN        [x] Family at bedside: wife       [] Other:    -If unable to interview patient: [] Trach/Vent/BiPAP  [] Disoriented/confused/inappropriate to interview    Diet Order:   Diet, NPO with Tube Feed:   Tube Feeding Modality: Nasogastric  Glucerna 1.2 [place] (GLUCERNARTH)  Total Volume for 24 Hours (mL): 1560  Continuous  Starting Tube Feed Rate {mL per Hour}: 30  Increase Tube Feed Rate by (mL): 10     Every hour  Until Goal Tube Feed Rate (mL per Hour): 65  Tube Feed Duration (in Hours): 24  Tube Feed Start Time: 14:25  No Carb Prosource (1pkg = 15gms Protein)     Qty per Day:  2 (22)  Current EN order provides, 1560mL, 1872kcal(1952kcal with prosource), 93.6g (115.6g with prosource), 1256mL free water    - Is current order appropriate/adequate? [x] Yes  []  No:   EN provision:    1560mL Glucerna 1.2 100%goal met    1560mL Glucerna 1.2 100% goal met    1365mL Glucerna 1.2 87.5% goal met   445mL Glucerna 1.2 28.5% goal met   1040mL Glucerna 1.2 66.6% goal met  12/15 1560mL Glucerna 1.2 100% goal met   1100mL Glucerna 1.2 & 90mL vital AF    795mL Glucerna 1.2 50.9% goal met        - PO intake :   NPO on tube feed    - Nutrition-related concerns:  - Pt currently on trach collar trial off ventilator   - Intake: pt is currently receiving Glucerna 1.2 @ goal rate 65ml/hr x24hr.   - GI:  Reports good tolerance to tube feed. No N/V/diarrhea/constipation noted per wife at bedside. Per flowsheet, last bowel movement . No bowel regimen ordered. Notes protonic ordered.    - Pancreatitis ongoing; elevated lipase and amylase   - Renal: pt presented with ERIC and was treated with CRRT. Plan for HD today. elevated BUN 34 Cr 2.25; Na, K, Phos are within limit today. Elevated Mg 3  - Endo: PMH of DM. a1c 7.3%. POCT 161-167mg/dL. Insulin sliding scale ordered.      Weights:   Daily Weight in k.7 (-20), Weight in k.7 (-19), Weight in k.7 (-18), Weight in k.2 (12-16), Weight in k.6 (12-15), Weight in k.5 (12-14)  - weight is downtrending, suspect partial weight change related to fluid shift. Continue trend weight. Pt was on CRRT and edema is improving.    Drug Dosing Weight  Height (cm): 172.7 (16 Dec 2022 16:25)  Weight (kg): 92.9 (16 Dec 2022 16:25)  BMI (kg/m2): 31.1 (16 Dec 2022 16:25)  BSA (m2): 2.06 (16 Dec 2022 16:25)    Nutritionally Pertinent MEDICATIONS  (STANDING):  aMIOdarone    Tablet  atorvastatin  insulin lispro (ADMELOG) corrective regimen sliding scale  multivitamin/minerals/iron Oral Solution (CENTRUM)  pantoprazole  Injectable  thiamine    Pertinent Labs:  @ 00:52: BUN 34<H>, Cr 2.25<H>, <H>, Mg 3.0<H>,   A1C with Estimated Average Glucose Result: 7.3 % (22 @ 03:58)    Finger Sticks:  POCT Blood Glucose.: 167 mg/dL ( @ 05:39)  POCT Blood Glucose.: 161 mg/dL ( @ 01:09)  POCT Blood Glucose.: 188 mg/dL ( @ 17:59)  POCT Blood Glucose.: 187 mg/dL ( @ 15:47)  POCT Blood Glucose.: 156 mg/dL ( @ 10:21)      Skin per nursing documentation: No pressure injury documented at this time.   Edema: Per flowsheet  generalized edema 1+.    Estimated Needs:   [x] recalculated:   Estimated Energy Needs: 2000-2442kcal (23-28kcal/kg)  Estimated Protein Needs: 104-122g protein (1.2-1.4g/kg)  Defer fluids to team.   Needs based on lowest daily wt 87.2kg () for energy & protein due to pt is currently off ventilator     Previous Nutrition Diagnosis: Severe acute malnutrition & Increased nutrient needs  Nutrition Diagnosis is: [x] ongoing  [] resolved [] not applicable   - addressed with EN feeding     New Nutrition Diagnosis: [x] Not applicable    Nutrition Care Plan:  [x] In Progress  [] Achieved  [] Not applicable    Nutrition Interventions:     Education Provided:       [] Yes:  [x] No: defer education at this time       Recommendations:      1. Continue current EN order Glucerna 1.2 @goal rate 65mL/hr x24hr, and Prosourse BID. To provide 1560mL, 1952kcal (22kcal/kg, based on lowest daily weight 87.2kg), 115.6g (1.3g/kg, based on lowest daily weight 87.2kg), 1256mL free water  2. Considering ongoing pancreatitis, recommend lower fat EN formula if current order is not tolerated. RD remains available for EN adjustment  3. Continue current micronutrient supplementation: multivitamin and thiamine  4. Continue to monitor nutritional intake, tolerance to diet prescription, weights, labs, skin integrity  5. RD remains available upon request and will follow up per protocol

## 2022-12-20 NOTE — PROGRESS NOTE ADULT - SUBJECTIVE AND OBJECTIVE BOX
Patient seen and examined at the bedside.    Remained critically ill on continuous ICU monitoring.      Brief Summary:  63 yo M admitted with cardiogenic shock s/p VA ECMO, decannulated 12/8/22.    24 Hour events:      OBJECTIVE:  Vital Signs Last 24 Hrs  T(C): 37.3 (20 Dec 2022 04:00), Max: 37.4 (20 Dec 2022 00:00)  T(F): 99.1 (20 Dec 2022 04:00), Max: 99.3 (20 Dec 2022 00:00)  HR: 109 (20 Dec 2022 06:00) (88 - 109)  BP: --  BP(mean): --  RR: 31 (20 Dec 2022 06:00) (15 - 36)  SpO2: 96% (20 Dec 2022 06:00) (95% - 100%)    Parameters below as of 20 Dec 2022 04:00  Patient On (Oxygen Delivery Method): ventilator  O2 Flow (L/min): 40    Mode: standby    Physical Exam:   General: Trach to vent, no acute distress  Neurology: Following commands  Eyes: bilateral pupils equal and reactive   ENT/Neck: +Trach, Neck supple, No JVD   Respiratory: Clear bilaterally   CV: S1S2, no murmurs        [x] Sternal dressing        [x] Sinus rhythm   Abdominal: Soft, NT, ND +BS   Extremities: Warm  Right groin wound VAC intact         -------------------------------------------------------------------------------------------------------------------------------                        9.5    12.77 )-----------( 102      ( 20 Dec 2022 00:52 )             31.2     12-20    136  |  102  |  34<H>  ----------------------------<  181<H>  4.6   |  21<L>  |  2.25<H>    Ca    8.0<L>      20 Dec 2022 00:52  Phos  4.1     12-20  Mg     3.0     12-20    TPro  7.0  /  Alb  3.0<L>  /  TBili  0.8  /  DBili  x   /  AST  49<H>  /  ALT  24  /  AlkPhos  113  12-20    LIVER FUNCTIONS - ( 20 Dec 2022 00:52 )  Alb: 3.0 g/dL / Pro: 7.0 g/dL / ALK PHOS: 113 U/L / ALT: 24 U/L / AST: 49 U/L / GGT: x             ABG - ( 20 Dec 2022 06:30 )  pH, Arterial: 7.39  pH, Blood: x     /  pCO2: 34    /  pO2: 82    / HCO3: 21    / Base Excess: -3.8  /  SaO2: 97.0    ------------------------------------------------------------------------------------------------------------------------------  Assessment:  61 y/o male with PMH of CABG, DM, HTN, HLD presenting as transfer from [place] for c/f STEMI. PTA arrival received 325 aspirin, 600 plavix, and no heparin drip started. Around 1:30 am patient had multiple episodes of non-bloody diarrhea and emesis with associated abdominal pain and chest pain, unable to localize, non-radiating, with associated SOB and no associated palpitations or diaphoresis. No recent fevers/chills, cough, rashes, or changes in urination. Does report mild diffuse back pain; started 5 days ago in setting on injury while walking and lifting objects. Denies focal weakness, numbness/tingling, or LOC due does report mild dizziness.    Cardiogenic shock s/p VA ECMO decannulated 12/8  Mitral regurgitation s/p Mitral clip x1 12/16/22  Acute respiratory distress/failure s/p Tracheostomy 12/16/22   IABP placed ( IABP dc'ed 12/17)   At high risk for hemodynamic instability  ERIC  Leukocytosis   Thrombocytopenia  Anemia  Acute pancreatitis    Plan:   ***Neuro***  CT head showed 4mm subdural hematoma 11/26: repeat CT head unchanged 12/01  Soft palate laceration, ENT scoped 12/8, stable, no acute intervention at this time  Repeat Head CT 12/13 Similar minimal increased density along the right aspect of the posterior   falx and right tentorial leaf, consistent with minimal residual subdural   hemorrhage.  No parenchymal hemorrhage or brain edema.  Acute pain control with Tylenol    ***Cardiovascular***  At high risk for hemodynamic instability and cardiac arrhythmias.  Off inotropes and pressors currently.  AC Therapy with Heparin gtt pAF/flutter   Amiodarone for rate control - transitioned to PO  ASA /Statin daily    ***Pulmonary***  Respiratory failure s/p Tracheostomy - continue TC trials  Wean ventilator as tolerated.   Deep breathing and coughing exercises.  Wean oxygen as able.    ***GI***  Protein calorie malnutrition - Tube feeds via nasal feeding tube  Pancreatitis   Protonix for stress ulcer prophylaxis     ***Renal***  ERIC, renal following, continue CRRT  Volume removal as tolerated.  Replete electrolytes as indicated.    ***ID***  Leukocytosis - off antibiotics currently.  Appreciate ID input    ***Endocrine***  Hx of DM2, continue glycemic control w/ ISS.    Patient requires continuous monitoring with bedside rhythm monitoring, pulse oximetry monitoring, and continuous central venous and arterial pressure monitoring; and intermittent blood gas analysis. Care plan discussed with the ICU care team.   Patient remained critical, at risk for life threatening decompensation.    I have spent 30 minutes providing critical care management to this patient.    By signing my name below, I, Rosio Alonzo, attest that this documentation has been prepared under the direction and in the presence of Jade Rosas MD  Electronically signed: Brian Burnett, 12-20-22 @ 07:43    I, Jade Rosas MD, personally performed the services described in this documentation. all medical record entries made by the scribe were at my direction and in my presence. I have reviewed the chart and agree that the record reflects my personal performance and is accurate and complete  Electronically signed: Jade Rosas MD Patient seen and examined at the bedside.    Remained critically ill on continuous ICU monitoring.      Brief Summary:  63 yo M admitted with cardiogenic shock s/p VA ECMO, decannulated 12/8/22.    24 Hour events:      OBJECTIVE:  Vital Signs Last 24 Hrs  T(C): 37.3 (20 Dec 2022 04:00), Max: 37.4 (20 Dec 2022 00:00)  T(F): 99.1 (20 Dec 2022 04:00), Max: 99.3 (20 Dec 2022 00:00)  HR: 109 (20 Dec 2022 06:00) (88 - 109)  BP: --  BP(mean): --  RR: 31 (20 Dec 2022 06:00) (15 - 36)  SpO2: 96% (20 Dec 2022 06:00) (95% - 100%)    Parameters below as of 20 Dec 2022 04:00  Patient On (Oxygen Delivery Method): ventilator  O2 Flow (L/min): 40    Mode: standby    Physical Exam:   General: Trach to vent, no acute distress  Neurology: Following commands  Eyes: bilateral pupils equal and reactive   ENT/Neck: +Trach, Neck supple, No JVD   Respiratory: Clear bilaterally   CV: S1S2, no murmurs        [x] Sternal dressing        [x] Sinus rhythm   Abdominal: Soft, NT, ND +BS   Extremities: Warm  Right groin wound VAC intact         -------------------------------------------------------------------------------------------------------------------------------                        9.5    12.77 )-----------( 102      ( 20 Dec 2022 00:52 )             31.2     12-20    136  |  102  |  34<H>  ----------------------------<  181<H>  4.6   |  21<L>  |  2.25<H>    Ca    8.0<L>      20 Dec 2022 00:52  Phos  4.1     12-20  Mg     3.0     12-20    TPro  7.0  /  Alb  3.0<L>  /  TBili  0.8  /  DBili  x   /  AST  49<H>  /  ALT  24  /  AlkPhos  113  12-20    LIVER FUNCTIONS - ( 20 Dec 2022 00:52 )  Alb: 3.0 g/dL / Pro: 7.0 g/dL / ALK PHOS: 113 U/L / ALT: 24 U/L / AST: 49 U/L / GGT: x             ABG - ( 20 Dec 2022 06:30 )  pH, Arterial: 7.39  pH, Blood: x     /  pCO2: 34    /  pO2: 82    / HCO3: 21    / Base Excess: -3.8  /  SaO2: 97.0    ------------------------------------------------------------------------------------------------------------------------------  Assessment:  63 y/o male with PMH of CABG, DM, HTN, HLD presenting as transfer from [place] for c/f STEMI. PTA arrival received 325 aspirin, 600 plavix, and no heparin drip started. Around 1:30 am patient had multiple episodes of non-bloody diarrhea and emesis with associated abdominal pain and chest pain, unable to localize, non-radiating, with associated SOB and no associated palpitations or diaphoresis. No recent fevers/chills, cough, rashes, or changes in urination. Does report mild diffuse back pain; started 5 days ago in setting on injury while walking and lifting objects. Denies focal weakness, numbness/tingling, or LOC due does report mild dizziness.    Cardiogenic shock s/p VA ECMO decannulated 12/8  Mitral regurgitation s/p Mitral clip x1 12/16/22  Acute respiratory distress/failure s/p Tracheostomy 12/16/22   IABP placed ( IABP dc'ed 12/17)   At high risk for hemodynamic instability  ERIC  Leukocytosis   Thrombocytopenia  Anemia  Acute pancreatitis    Plan:   ***Neuro***  CT head showed 4mm subdural hematoma 11/26: repeat CT head unchanged 12/01  Soft palate laceration, ENT scoped 12/8, stable, no acute intervention at this time  Repeat Head CT 12/13 Similar minimal increased density along the right aspect of the posterior   falx and right tentorial leaf, consistent with minimal residual subdural   hemorrhage.  No parenchymal hemorrhage or brain edema.  Acute pain control with Tylenol    ***Cardiovascular***  At high risk for hemodynamic instability and cardiac arrhythmias.  Off inotropes and pressors currently.  AC Therapy with Heparin gtt pAF/flutter   Amiodarone for rate control - transitioned to PO  ASA /Statin daily    ***Pulmonary***  Respiratory failure s/p Tracheostomy - continue TC trials  Wean ventilator as tolerated.   Deep breathing and coughing exercises.  Wean oxygen as able.    ***GI***  Protein calorie malnutrition - Tube feeds via nasal feeding tube  Pancreatitis   Protonix for stress ulcer prophylaxis     ***Renal***  ERIC, renal following, continue CRRT  Volume removal as tolerated.  Replete electrolytes as indicated.    ***ID***  Leukocytosis - off antibiotics currently.  Appreciate ID input    ***Endocrine***  Hx of DM2, continue glycemic control w/ ISS.    Patient requires continuous monitoring with bedside rhythm monitoring, pulse oximetry monitoring, and continuous central venous and arterial pressure monitoring; and intermittent blood gas analysis. Care plan discussed with the ICU care team.   Patient remained critical, at risk for life threatening decompensation.    I have spent 30 minutes providing critical care management to this patient.    By signing my name below, I, Rosio Alonzo, attest that this documentation has been prepared under the direction and in the presence of Jade Rosas MD  Electronically signed: Brian Burnett, 12-20-22 @ 07:43    I, Jade Rosas MD, personally performed the services described in this documentation. all medical record entries made by the scribe were at my direction and in my presence. I have reviewed the chart and agree that the record reflects my personal performance and is accurate and complete  Electronically signed: Jade Rosas MD Patient seen and examined at the bedside.    Remained critically ill on continuous ICU monitoring.      Brief Summary:  61 yo M admitted with cardiogenic shock s/p VA ECMO, decannulated 12/8/22.    24 Hour events:      OBJECTIVE:  Vital Signs Last 24 Hrs  T(C): 37.3 (20 Dec 2022 04:00), Max: 37.4 (20 Dec 2022 00:00)  T(F): 99.1 (20 Dec 2022 04:00), Max: 99.3 (20 Dec 2022 00:00)  HR: 109 (20 Dec 2022 06:00) (88 - 109)  BP: --  BP(mean): --  RR: 31 (20 Dec 2022 06:00) (15 - 36)  SpO2: 96% (20 Dec 2022 06:00) (95% - 100%)    Parameters below as of 20 Dec 2022 04:00  Patient On (Oxygen Delivery Method): ventilator  O2 Flow (L/min): 40    Mode: standby    Physical Exam:   General: Trach to vent, no acute distress  Neurology: Following commands  Eyes: bilateral pupils equal and reactive   ENT/Neck: +Trach, Neck supple, No JVD   Respiratory: Clear bilaterally   CV: S1S2, no murmurs        [x] Sternal dressing        [x] Sinus rhythm   Abdominal: Soft, NT, ND +BS   Extremities: Warm  Right groin wound VAC intact         -------------------------------------------------------------------------------------------------------------------------------                        9.5    12.77 )-----------( 102      ( 20 Dec 2022 00:52 )             31.2     12-20    136  |  102  |  34<H>  ----------------------------<  181<H>  4.6   |  21<L>  |  2.25<H>    Ca    8.0<L>      20 Dec 2022 00:52  Phos  4.1     12-20  Mg     3.0     12-20    TPro  7.0  /  Alb  3.0<L>  /  TBili  0.8  /  DBili  x   /  AST  49<H>  /  ALT  24  /  AlkPhos  113  12-20    LIVER FUNCTIONS - ( 20 Dec 2022 00:52 )  Alb: 3.0 g/dL / Pro: 7.0 g/dL / ALK PHOS: 113 U/L / ALT: 24 U/L / AST: 49 U/L / GGT: x             ABG - ( 20 Dec 2022 06:30 )  pH, Arterial: 7.39  pH, Blood: x     /  pCO2: 34    /  pO2: 82    / HCO3: 21    / Base Excess: -3.8  /  SaO2: 97.0    ------------------------------------------------------------------------------------------------------------------------------  Assessment:  63 y/o male with PMH of CABG, DM, HTN, HLD presenting as transfer from [place] for c/f STEMI. PTA arrival received 325 aspirin, 600 plavix, and no heparin drip started. Around 1:30 am patient had multiple episodes of non-bloody diarrhea and emesis with associated abdominal pain and chest pain, unable to localize, non-radiating, with associated SOB and no associated palpitations or diaphoresis. No recent fevers/chills, cough, rashes, or changes in urination. Does report mild diffuse back pain; started 5 days ago in setting on injury while walking and lifting objects. Denies focal weakness, numbness/tingling, or LOC due does report mild dizziness.    Cardiogenic shock s/p VA ECMO decannulated 12/8  Mitral regurgitation s/p Mitral clip x1 12/16/22  Acute respiratory distress/failure s/p Tracheostomy 12/16/22   IABP placed ( IABP dc'ed 12/17)   At high risk for hemodynamic instability  ERIC  Leukocytosis   Thrombocytopenia  Anemia  Acute pancreatitis    Plan:   ***Neuro***  CT head showed 4mm subdural hematoma 11/26: repeat CT head unchanged 12/01  Soft palate laceration, ENT scoped 12/8, stable, no acute intervention at this time  Repeat Head CT 12/13 Similar minimal increased density along the right aspect of the posterior   falx and right tentorial leaf, consistent with minimal residual subdural   hemorrhage.  No parenchymal hemorrhage or brain edema.  Acute pain control with Tylenol    ***Cardiovascular***  At high risk for hemodynamic instability and cardiac arrhythmias.  Off inotropes and pressors currently.  AC Therapy with Heparin gtt pAF/flutter   Amiodarone for rate control - transitioned to PO  ASA /Statin daily    ***Pulmonary***  Respiratory failure s/p Tracheostomy - continue TC trials  Wean ventilator as tolerated.   Deep breathing and coughing exercises.  Wean oxygen as able.    ***GI***  Protein calorie malnutrition - Tube feeds via nasal feeding tube  Pancreatitis   Protonix for stress ulcer prophylaxis     ***Renal***  ERIC, renal following, continue CRRT  Volume removal as tolerated.  Replete electrolytes as indicated.    ***ID***  Leukocytosis - off antibiotics currently.  Appreciate ID input    ***Endocrine***  Hx of DM2, continue glycemic control w/ ISS.    Patient requires continuous monitoring with bedside rhythm monitoring, pulse oximetry monitoring, and continuous central venous and arterial pressure monitoring; and intermittent blood gas analysis. Care plan discussed with the ICU care team.   Patient remained critical, at risk for life threatening decompensation.    I have spent 30 minutes providing critical care management to this patient.    By signing my name below, I, Rosio Alonzo, attest that this documentation has been prepared under the direction and in the presence of Jade Rosas MD  Electronically signed: Brian Burnett, 12-20-22 @ 07:43    I, Jade Rosas MD, personally performed the services described in this documentation. all medical record entries made by the scribe were at my direction and in my presence. I have reviewed the chart and agree that the record reflects my personal performance and is accurate and complete  Electronically signed: Jade Rosas MD Patient seen and examined at the bedside.    Remained critically ill on continuous ICU monitoring.      Brief Summary:  63 yo M admitted with cardiogenic shock s/p VA ECMO, decannulated 12/8/22.    24 Hour events:  No acute events overnight.  Tolerated 14 hours of trach collar yesterday.  CRRT on hold - plan to transition to iHD    OBJECTIVE:  Vital Signs Last 24 Hrs  T(C): 37.3 (20 Dec 2022 04:00), Max: 37.4 (20 Dec 2022 00:00)  T(F): 99.1 (20 Dec 2022 04:00), Max: 99.3 (20 Dec 2022 00:00)  HR: 109 (20 Dec 2022 06:00) (88 - 109)  BP: --  BP(mean): --  RR: 31 (20 Dec 2022 06:00) (15 - 36)  SpO2: 96% (20 Dec 2022 06:00) (95% - 100%)    Parameters below as of 20 Dec 2022 04:00  Patient On (Oxygen Delivery Method): ventilator  O2 Flow (L/min): 40    Mode: standby    Physical Exam:   General: OOB to chair, no acute distress  Neurology: Following commands  Eyes: bilateral pupils equal and reactive   ENT/Neck: +Trach, Neck supple  Respiratory: Clear bilaterally   CV: S1S2, Sinus   Abdominal: Soft, NT, ND +BS   Extremities: Warm    -------------------------------------------------------------------------------------------------------------------------------                        9.5    12.77 )-----------( 102      ( 20 Dec 2022 00:52 )             31.2     12-20    136  |  102  |  34<H>  ----------------------------<  181<H>  4.6   |  21<L>  |  2.25<H>    Ca    8.0<L>      20 Dec 2022 00:52  Phos  4.1     12-20  Mg     3.0     12-20    TPro  7.0  /  Alb  3.0<L>  /  TBili  0.8  /  DBili  x   /  AST  49<H>  /  ALT  24  /  AlkPhos  113  12-20    LIVER FUNCTIONS - ( 20 Dec 2022 00:52 )  Alb: 3.0 g/dL / Pro: 7.0 g/dL / ALK PHOS: 113 U/L / ALT: 24 U/L / AST: 49 U/L / GGT: x             ABG - ( 20 Dec 2022 06:30 )  pH, Arterial: 7.39  pH, Blood: x     /  pCO2: 34    /  pO2: 82    / HCO3: 21    / Base Excess: -3.8  /  SaO2: 97.0    ------------------------------------------------------------------------------------------------------------------------------  Assessment:  63 y/o male with PMH of CABG, DM, HTN, HLD presenting as transfer from [place] for c/f STEMI.     Cardiogenic shock s/p VA ECMO decannulated 12/8  Mitral regurgitation s/p Mitral clip x1 12/16/22  Acute respiratory distress/failure s/p Tracheostomy 12/16/22   IABP placed ( IABP dc'ed 12/17)   At high risk for hemodynamic instability  ERIC  Thrombocytopenia  Anemia  Acute pancreatitis    Plan:   ***Neuro***  CT head showed 4mm subdural hematoma 11/26: repeat CT head unchanged 12/01  Soft palate laceration, ENT scoped 12/8, stable, no acute intervention at this time  Repeat Head CT 12/13 Similar minimal increased density along the right aspect of the posterior   falx and right tentorial leaf, consistent with minimal residual subdural   hemorrhage.  No parenchymal hemorrhage or brain edema.  Acute pain control with Tylenol  PT ongoing.    ***Cardiovascular***  At high risk for hemodynamic instability and cardiac arrhythmias.  Off inotropes and pressors currently.  Plan for repeat TTE today - consider Beta blocker pending result  AC Therapy with Heparin gtt pAF/flutter   Amiodarone   ASA /Statin daily    ***Pulmonary***  Respiratory failure s/p Tracheostomy - continue TC trials  Wean ventilator as tolerated.   Deep breathing and coughing exercises.  Wean oxygen as able.    ***GI***  Protein calorie malnutrition - Tube feeds via nasal feeding tube  Pancreatitis - Amylase and lipase elevated, will trend.  Protonix for stress ulcer prophylaxis     ***Renal***  ERIC, renal following, transition from CRRT to HD  Replete electrolytes as indicated.    ***ID***  Leukocytosis - off antibiotics currently.  Appreciate ID input    ***Endocrine***  Hx of DM2, continue glycemic control w/ ISS.    Patient requires continuous monitoring with bedside rhythm monitoring, pulse oximetry monitoring, and continuous central venous and arterial pressure monitoring; and intermittent blood gas analysis. Care plan discussed with the ICU care team.   Patient remained critical, at risk for life threatening decompensation.    I have spent 38 minutes providing critical care management to this patient.    By signing my name below, I, Rosio Alonzo, attest that this documentation has been prepared under the direction and in the presence of Jade Rosas MD  Electronically signed: Brian Burnett, 12-20-22 @ 07:43    I, Jade Rosas MD, personally performed the services described in this documentation. all medical record entries made by the scribe were at my direction and in my presence. I have reviewed the chart and agree that the record reflects my personal performance and is accurate and complete  Electronically signed: Jade Rosas MD Patient seen and examined at the bedside.    Remained critically ill on continuous ICU monitoring.      Brief Summary:  63 yo M admitted with cardiogenic shock s/p VA ECMO, decannulated 12/8/22.    24 Hour events:  No acute events overnight.  Tolerated 14 hours of trach collar yesterday.  CRRT on hold - plan to transition to iHD    OBJECTIVE:  Vital Signs Last 24 Hrs  T(C): 37.3 (20 Dec 2022 04:00), Max: 37.4 (20 Dec 2022 00:00)  T(F): 99.1 (20 Dec 2022 04:00), Max: 99.3 (20 Dec 2022 00:00)  HR: 109 (20 Dec 2022 06:00) (88 - 109)  BP: --  BP(mean): --  RR: 31 (20 Dec 2022 06:00) (15 - 36)  SpO2: 96% (20 Dec 2022 06:00) (95% - 100%)    Parameters below as of 20 Dec 2022 04:00  Patient On (Oxygen Delivery Method): ventilator  O2 Flow (L/min): 40    Mode: standby    Physical Exam:   General: OOB to chair, no acute distress  Neurology: Following commands  Eyes: bilateral pupils equal and reactive   ENT/Neck: +Trach, Neck supple  Respiratory: Clear bilaterally   CV: S1S2, Sinus   Abdominal: Soft, NT, ND +BS   Extremities: Warm    -------------------------------------------------------------------------------------------------------------------------------                        9.5    12.77 )-----------( 102      ( 20 Dec 2022 00:52 )             31.2     12-20    136  |  102  |  34<H>  ----------------------------<  181<H>  4.6   |  21<L>  |  2.25<H>    Ca    8.0<L>      20 Dec 2022 00:52  Phos  4.1     12-20  Mg     3.0     12-20    TPro  7.0  /  Alb  3.0<L>  /  TBili  0.8  /  DBili  x   /  AST  49<H>  /  ALT  24  /  AlkPhos  113  12-20    LIVER FUNCTIONS - ( 20 Dec 2022 00:52 )  Alb: 3.0 g/dL / Pro: 7.0 g/dL / ALK PHOS: 113 U/L / ALT: 24 U/L / AST: 49 U/L / GGT: x             ABG - ( 20 Dec 2022 06:30 )  pH, Arterial: 7.39  pH, Blood: x     /  pCO2: 34    /  pO2: 82    / HCO3: 21    / Base Excess: -3.8  /  SaO2: 97.0    ------------------------------------------------------------------------------------------------------------------------------  Assessment:  61 y/o male with PMH of CABG, DM, HTN, HLD presenting as transfer from [place] for c/f STEMI.     Cardiogenic shock s/p VA ECMO decannulated 12/8  Mitral regurgitation s/p Mitral clip x1 12/16/22  Acute respiratory distress/failure s/p Tracheostomy 12/16/22   IABP placed ( IABP dc'ed 12/17)   At high risk for hemodynamic instability  ERIC  Thrombocytopenia  Anemia  Acute pancreatitis    Plan:   ***Neuro***  CT head showed 4mm subdural hematoma 11/26: repeat CT head unchanged 12/01  Soft palate laceration, ENT scoped 12/8, stable, no acute intervention at this time  Repeat Head CT 12/13 Similar minimal increased density along the right aspect of the posterior   falx and right tentorial leaf, consistent with minimal residual subdural   hemorrhage.  No parenchymal hemorrhage or brain edema.  Acute pain control with Tylenol  PT ongoing.    ***Cardiovascular***  At high risk for hemodynamic instability and cardiac arrhythmias.  Off inotropes and pressors currently.  Plan for repeat TTE today - consider Beta blocker pending result  AC Therapy with Heparin gtt pAF/flutter   Amiodarone   ASA /Statin daily    ***Pulmonary***  Respiratory failure s/p Tracheostomy - continue TC trials  Wean ventilator as tolerated.   Deep breathing and coughing exercises.  Wean oxygen as able.    ***GI***  Protein calorie malnutrition - Tube feeds via nasal feeding tube  Pancreatitis - Amylase and lipase elevated, will trend.  Protonix for stress ulcer prophylaxis     ***Renal***  ERIC, renal following, transition from CRRT to HD  Replete electrolytes as indicated.    ***ID***  Leukocytosis - off antibiotics currently.  Appreciate ID input    ***Endocrine***  Hx of DM2, continue glycemic control w/ ISS.    Patient requires continuous monitoring with bedside rhythm monitoring, pulse oximetry monitoring, and continuous central venous and arterial pressure monitoring; and intermittent blood gas analysis. Care plan discussed with the ICU care team.   Patient remained critical, at risk for life threatening decompensation.    I have spent 38 minutes providing critical care management to this patient.    By signing my name below, I, Rosio Alonzo, attest that this documentation has been prepared under the direction and in the presence of Jade Rosas MD  Electronically signed: Brian Burnett, 12-20-22 @ 07:43    I, Jade Rosas MD, personally performed the services described in this documentation. all medical record entries made by the scribe were at my direction and in my presence. I have reviewed the chart and agree that the record reflects my personal performance and is accurate and complete  Electronically signed: Jade Rosas MD Patient seen and examined at the bedside.    Remained critically ill on continuous ICU monitoring.      Brief Summary:  63 yo M admitted with cardiogenic shock s/p VA ECMO, decannulated 12/8/22.    24 Hour events:  No acute events overnight.  Tolerated 14 hours of trach collar yesterday.  CRRT on hold - plan to transition to iHD    OBJECTIVE:  Vital Signs Last 24 Hrs  T(C): 37.3 (20 Dec 2022 04:00), Max: 37.4 (20 Dec 2022 00:00)  T(F): 99.1 (20 Dec 2022 04:00), Max: 99.3 (20 Dec 2022 00:00)  HR: 109 (20 Dec 2022 06:00) (88 - 109)  BP: --  BP(mean): --  RR: 31 (20 Dec 2022 06:00) (15 - 36)  SpO2: 96% (20 Dec 2022 06:00) (95% - 100%)    Parameters below as of 20 Dec 2022 04:00  Patient On (Oxygen Delivery Method): ventilator  O2 Flow (L/min): 40    Mode: standby    Physical Exam:   General: OOB to chair, no acute distress  Neurology: Following commands  Eyes: bilateral pupils equal and reactive   ENT/Neck: +Trach, Neck supple  Respiratory: Clear bilaterally   CV: S1S2, Sinus   Abdominal: Soft, NT, ND +BS   Extremities: Warm    -------------------------------------------------------------------------------------------------------------------------------                        9.5    12.77 )-----------( 102      ( 20 Dec 2022 00:52 )             31.2     12-20    136  |  102  |  34<H>  ----------------------------<  181<H>  4.6   |  21<L>  |  2.25<H>    Ca    8.0<L>      20 Dec 2022 00:52  Phos  4.1     12-20  Mg     3.0     12-20    TPro  7.0  /  Alb  3.0<L>  /  TBili  0.8  /  DBili  x   /  AST  49<H>  /  ALT  24  /  AlkPhos  113  12-20    LIVER FUNCTIONS - ( 20 Dec 2022 00:52 )  Alb: 3.0 g/dL / Pro: 7.0 g/dL / ALK PHOS: 113 U/L / ALT: 24 U/L / AST: 49 U/L / GGT: x             ABG - ( 20 Dec 2022 06:30 )  pH, Arterial: 7.39  pH, Blood: x     /  pCO2: 34    /  pO2: 82    / HCO3: 21    / Base Excess: -3.8  /  SaO2: 97.0    ------------------------------------------------------------------------------------------------------------------------------  Assessment:  61 y/o male with PMH of CABG, DM, HTN, HLD presenting as transfer from [place] for c/f STEMI.     Cardiogenic shock s/p VA ECMO decannulated 12/8  Mitral regurgitation s/p Mitral clip x1 12/16/22  Acute respiratory distress/failure s/p Tracheostomy 12/16/22   IABP placed ( IABP dc'ed 12/17)   At high risk for hemodynamic instability  ERIC  Thrombocytopenia  Anemia  Acute pancreatitis    Plan:   ***Neuro***  CT head showed 4mm subdural hematoma 11/26: repeat CT head unchanged 12/01  Soft palate laceration, ENT scoped 12/8, stable, no acute intervention at this time  Repeat Head CT 12/13 Similar minimal increased density along the right aspect of the posterior   falx and right tentorial leaf, consistent with minimal residual subdural   hemorrhage.  No parenchymal hemorrhage or brain edema.  Acute pain control with Tylenol  PT ongoing.    ***Cardiovascular***  At high risk for hemodynamic instability and cardiac arrhythmias.  Off inotropes and pressors currently.  Plan for repeat TTE today - consider Beta blocker pending result  AC Therapy with Heparin gtt pAF/flutter   Amiodarone   ASA /Statin daily    ***Pulmonary***  Respiratory failure s/p Tracheostomy - continue TC trials  Wean ventilator as tolerated.   Deep breathing and coughing exercises.  Wean oxygen as able.    ***GI***  Protein calorie malnutrition - Tube feeds via nasal feeding tube  Pancreatitis - Amylase and lipase elevated, will trend.  Protonix for stress ulcer prophylaxis     ***Renal***  ERIC, renal following, transition from CRRT to HD  Replete electrolytes as indicated.    ***ID***  Leukocytosis - off antibiotics currently.  Appreciate ID input    ***Endocrine***  Hx of DM2, continue glycemic control w/ ISS.    Patient requires continuous monitoring with bedside rhythm monitoring, pulse oximetry monitoring, and continuous central venous and arterial pressure monitoring; and intermittent blood gas analysis. Care plan discussed with the ICU care team.   Patient remained critical, at risk for life threatening decompensation.    I have spent 38 minutes providing critical care management to this patient.    By signing my name below, I, Rosio Alonzo, attest that this documentation has been prepared under the direction and in the presence of Jaed Rosas MD  Electronically signed: Brian Burnett, 12-20-22 @ 07:43    I, Jade Rosas MD, personally performed the services described in this documentation. all medical record entries made by the scribe were at my direction and in my presence. I have reviewed the chart and agree that the record reflects my personal performance and is accurate and complete  Electronically signed: Jade Rosas MD

## 2022-12-20 NOTE — PROGRESS NOTE ADULT - ASSESSMENT
61 YO M now S/P #7 Proximal XLT Tracheostomy POD # 4. In inferior and superior surgicel around stoma removed. Doing well on the ventilator. No bleeding noted. 63 YO M now S/P #7 Proximal XLT Tracheostomy POD # 4. In inferior and superior surgicel around stoma removed. Doing well on the ventilator. No bleeding noted.

## 2022-12-20 NOTE — PROGRESS NOTE ADULT - NS ATTEND AMEND GEN_ALL_CORE FT
ENT follow up s/p trach 12/16/22. Physical exam showed some blood seen in trach. Tracheoscope was performed and no blood was seen pooling from mainstem bronchi, no streaking of blood on wall of trachea, no trauma seen from suctioning. There was small amount of oozing from inferior stoma, surgicel placed.    Recommend  -Routine Trach Care.   -Remove Sutures on POD #7.

## 2022-12-20 NOTE — PROGRESS NOTE ADULT - PROBLEM SELECTOR PLAN 1
·  Plan: - Keep the Trach site clean and dry.   - Remove Sutures on POD #7.   - Keep the Omniflex for a week to avoid the manipulation of the stoma.   - Elevate HOB.   - Gentle suction prn.   - Vent per RT.

## 2022-12-20 NOTE — PROGRESS NOTE ADULT - PROBLEM SELECTOR PLAN 1
Pt with non oliguric ERIC/ ATN in the setting of STEMI, cardiac arrest & cardiogenic shock  SCr on arrival was 2.15; trended down to hector 1.6 on 11/25; slowly trended up & increased to 3.95 11/28.   Pt received IV diuretics. SCr increased to 4.19 with BUN of 101 on 12/5/22. Pt with significant volume overload. Pt initiated on CRRT/CVVHDF to optimize volume and electrolytes on 12/5/22. Pt likely with ATN. Pt underwent decannulation of ECMO on 12/8/22 and was taken off CRRT. Pt reinitiated on CRRT overnight on 12/9/22 for volume overload. s/p trach on 12/16. CRRT stopped again 12/19. Pt remains anuric. Plan for HD today. Monitor labs and urine output. Avoid any potential nephrotoxins. Dose medications as per HD.    If you have any questions, please feel free to contact me  Arsalan Cochran  Nephrology Fellow  [phone]; Prefer [employer] TEAMS  (After 5pm or on weekends please page the on-call fellow) Pt with non oliguric ERIC/ ATN in the setting of STEMI, cardiac arrest & cardiogenic shock  SCr on arrival was 2.15; trended down to hector 1.6 on 11/25; slowly trended up & increased to 3.95 11/28.   Pt received IV diuretics. SCr increased to 4.19 with BUN of 101 on 12/5/22. Pt with significant volume overload. Pt initiated on CRRT/CVVHDF to optimize volume and electrolytes on 12/5/22. Pt likely with ATN. Pt underwent decannulation of ECMO on 12/8/22 and was taken off CRRT. Pt reinitiated on CRRT overnight on 12/9/22 for volume overload. s/p trach on 12/16. CRRT stopped again 12/19. Pt remains anuric. Plan for HD today. Monitor labs and urine output. Avoid any potential nephrotoxins. Dose medications as per HD.    If you have any questions, please feel free to contact me  Arsalan Cochran  Nephrology Fellow  [phone]; Prefer [employer] TEAMS  (After 5pm or on weekends please page the on-call fellow) Pt with non oliguric ERIC/ ATN in the setting of STEMI, cardiac arrest & cardiogenic shock  SCr on arrival was 2.15; trended down to hector 1.6 on 11/25; slowly trended up & increased to 3.95 11/28.   Pt received IV diuretics. SCr increased to 4.19 with BUN of 101 on 12/5/22. Pt with significant volume overload. Pt initiated on CRRT/CVVHDF to optimize volume and electrolytes on 12/5/22. Pt likely with ATN. Pt underwent decannulation of ECMO on 12/8/22 and was taken off CRRT. Pt reinitiated on CRRT overnight on 12/9/22 for volume overload. s/p trach on 12/16. CRRT stopped again 12/19. Pt remains anuric. Plan for HD today. Monitor labs and urine output. Avoid any potential nephrotoxins. Dose medications as per HD.    If you have any questions, please feel free to contact me  Arsalan Cochran  Nephrology Fellow  [phone]; Prefer [employer] TEAMS  (After 5pm or on weekends please page the on-call fellow)

## 2022-12-20 NOTE — CHART NOTE - NSCHARTNOTEFT_GEN_A_CORE
ENT was called to evaluate pt. for blood seen in trach. Tracheoscope was performed and no blood was seen pooling from mainstem bronchi, no streaking of blood on wall of trachea, no trauma seen from suctionning. There was small amount of oozing from inferior stoma, surgicel placed.

## 2022-12-20 NOTE — PROGRESS NOTE ADULT - ATTENDING COMMENTS
ERIC/ATN  Requiring CRRT, now stable for transition to hemodialysis today  Net negative fluid balance, edema  Dose meds for HD  MAP >65  Remainder per fellow, will follow

## 2022-12-20 NOTE — PROGRESS NOTE ADULT - SUBJECTIVE AND OBJECTIVE BOX
[facility] DIVISION OF KIDNEY DISEASES AND HYPERTENSION -- FOLLOW UP NOTE  --------------------------------------------------------------------------------  Chief Complaint: Acute pancreatitis without infection or necrosis    HPI:  61 y/o male with PMH of CABG, DM, HTN, HLD presenting as transfer from [place] for c/f STEMI. Course c/b gallstone pancreatitis leading to ARDS and shock & cardiac arrest now on VA ECMO. Had significant troponin elevation and his LVEF down to 8%. Pt was deemed to be too unstable to have an angiogram and potential PCI due to his co-morbidities & a new subdural bleed. Pt being seen for ERIC. SCr on arrival was 2.15; trended down to hector 1.6 on 11/25 and eventually increased to 3.95 11/28. Pt received IV diuretics as per CTU. Pt initiated on CRRT on 12/5/22 to optimize volume status and electrolytes.  Pt underwent decannulation of ECMO on 12/8/22. Pt was restarted on 12/9/22 for volume overload. Pt underwent mitral clip OR (12/16/22).  s/p trach on 12/16     HPI: Pt. seen this AM. CRRT stopped yesterday to wean to HD today. Off IV pressors. Breathing is ok but still coughing with secretions. No UOP.     PAST HISTORY  --------------------------------------------------------------------------------  No significant changes to PMH, PSH, FHx, SHx, unless otherwise noted    ALLERGIES & MEDICATIONS  --------------------------------------------------------------------------------  Allergies    No Known Allergies    Intolerances      Standing Inpatient Medications  aMIOdarone    Tablet 200 milliGRAM(s) Oral daily  aspirin  chewable 81 milliGRAM(s) Oral daily  atorvastatin 40 milliGRAM(s) Oral at bedtime  chlorhexidine 0.12% Liquid 15 milliLiter(s) Oral Mucosa every 12 hours  chlorhexidine 2% Cloths 1 Application(s) Topical <User Schedule>  CRRT Treatment    <Continuous>  heparin   Injectable 5000 Unit(s) SubCutaneous every 8 hours  heparin  Infusion Syringe 300 Unit(s)/Hr CRRT <Continuous>  insulin lispro (ADMELOG) corrective regimen sliding scale   SubCutaneous every 6 hours  multivitamin/minerals/iron Oral Solution (CENTRUM) 15 milliLiter(s) Oral daily  pantoprazole  Injectable 40 milliGRAM(s) IV Push daily  Phoxillum Filtration BK 4 / 2.5 5000 milliLiter(s) CRRT <Continuous>  Phoxillum Filtration BK 4 / 2.5 5000 milliLiter(s) CRRT <Continuous>  PrismaSOL Filtration BGK 4 / 2.5 5000 milliLiter(s) CRRT <Continuous>  thiamine 100 milliGRAM(s) Enteral Tube daily    PRN Inpatient Medications  acetaminophen    Suspension .. 650 milliGRAM(s) Oral every 6 hours PRN      REVIEW OF SYSTEMS  --------------------------------------------------------------------------------  As per HPI    VITALS/PHYSICAL EXAM  --------------------------------------------------------------------------------  T(C): 36.6 (12-20-22 @ 08:00), Max: 37.4 (12-20-22 @ 00:00)  HR: 104 (12-20-22 @ 08:56) (88 - 112)  BP: --  RR: 30 (12-20-22 @ 08:56) (15 - 36)  SpO2: 98% (12-20-22 @ 08:56) (95% - 100%)  Wt(kg): --        12-19-22 @ 07:01  -  12-20-22 @ 07:00  --------------------------------------------------------  IN: 1806.8 mL / OUT: 912 mL / NET: 894.8 mL    12-20-22 @ 07:01  -  12-20-22 @ 09:26  --------------------------------------------------------  IN: 140 mL / OUT: 0 mL / NET: 140 mL      Physical Exam:  	Gen: ill appearing male  	HEENT: Anicteric  	Pulm:  trach+  	CV: S1S2+  	Abd: Soft, +BS       	Ext: LE edema B/L+  	Neuro: Awake  	Skin: Warm and dry             Acces: CARMELO non tunneled HD catheter      LABS/STUDIES  --------------------------------------------------------------------------------              9.5    12.77 >-----------<  102      [12-20-22 @ 00:52]              31.2     136  |  102  |  34  ----------------------------<  181      [12-20-22 @ 00:52]  4.6   |  21  |  2.25        Ca     8.0     [12-20-22 @ 00:52]      Mg     3.0     [12-20-22 @ 00:52]      Phos  4.1     [12-20-22 @ 00:52]    TPro  7.0  /  Alb  3.0  /  TBili  0.8  /  DBili  x   /  AST  49  /  ALT  24  /  AlkPhos  113  [12-20-22 @ 00:52]      Creatinine Trend:  SCr 2.25 [12-20 @ 00:52]  SCr 1.40 [12-19 @ 00:34]  SCr 1.75 [12-18 @ 02:16]  SCr 1.41 [12-17 @ 00:12]  SCr 1.88 [12-16 @ 10:55]    Urinalysis - [12-16-22 @ 04:28]      Color DK BROWN / Appearance Turbid / SG 1.029 / pH 6.0      Gluc Trace / Ketone Negative  / Bili Negative / Urobili 6 mg/dL       Blood Large / Protein 300 mg/dL / Leuk Est Large / Nitrite Negative       /  / Hyaline 85 / Gran  / Sq Epi  / Non Sq Epi 8 / Bacteria Moderate      TSH 2.84      [12-10-22 @ 15:55]  Lipid: chol --, , HDL --, LDL --      [12-05-22 @ 00:50]       [facility] DIVISION OF KIDNEY DISEASES AND HYPERTENSION -- FOLLOW UP NOTE  --------------------------------------------------------------------------------  Chief Complaint: Acute pancreatitis without infection or necrosis    HPI:  63 y/o male with PMH of CABG, DM, HTN, HLD presenting as transfer from [place] for c/f STEMI. Course c/b gallstone pancreatitis leading to ARDS and shock & cardiac arrest now on VA ECMO. Had significant troponin elevation and his LVEF down to 8%. Pt was deemed to be too unstable to have an angiogram and potential PCI due to his co-morbidities & a new subdural bleed. Pt being seen for ERIC. SCr on arrival was 2.15; trended down to hector 1.6 on 11/25 and eventually increased to 3.95 11/28. Pt received IV diuretics as per CTU. Pt initiated on CRRT on 12/5/22 to optimize volume status and electrolytes.  Pt underwent decannulation of ECMO on 12/8/22. Pt was restarted on 12/9/22 for volume overload. Pt underwent mitral clip OR (12/16/22).  s/p trach on 12/16     HPI: Pt. seen this AM. CRRT stopped yesterday to wean to HD today. Off IV pressors. Breathing is ok but still coughing with secretions. No UOP.     PAST HISTORY  --------------------------------------------------------------------------------  No significant changes to PMH, PSH, FHx, SHx, unless otherwise noted    ALLERGIES & MEDICATIONS  --------------------------------------------------------------------------------  Allergies    No Known Allergies    Intolerances      Standing Inpatient Medications  aMIOdarone    Tablet 200 milliGRAM(s) Oral daily  aspirin  chewable 81 milliGRAM(s) Oral daily  atorvastatin 40 milliGRAM(s) Oral at bedtime  chlorhexidine 0.12% Liquid 15 milliLiter(s) Oral Mucosa every 12 hours  chlorhexidine 2% Cloths 1 Application(s) Topical <User Schedule>  CRRT Treatment    <Continuous>  heparin   Injectable 5000 Unit(s) SubCutaneous every 8 hours  heparin  Infusion Syringe 300 Unit(s)/Hr CRRT <Continuous>  insulin lispro (ADMELOG) corrective regimen sliding scale   SubCutaneous every 6 hours  multivitamin/minerals/iron Oral Solution (CENTRUM) 15 milliLiter(s) Oral daily  pantoprazole  Injectable 40 milliGRAM(s) IV Push daily  Phoxillum Filtration BK 4 / 2.5 5000 milliLiter(s) CRRT <Continuous>  Phoxillum Filtration BK 4 / 2.5 5000 milliLiter(s) CRRT <Continuous>  PrismaSOL Filtration BGK 4 / 2.5 5000 milliLiter(s) CRRT <Continuous>  thiamine 100 milliGRAM(s) Enteral Tube daily    PRN Inpatient Medications  acetaminophen    Suspension .. 650 milliGRAM(s) Oral every 6 hours PRN      REVIEW OF SYSTEMS  --------------------------------------------------------------------------------  As per HPI    VITALS/PHYSICAL EXAM  --------------------------------------------------------------------------------  T(C): 36.6 (12-20-22 @ 08:00), Max: 37.4 (12-20-22 @ 00:00)  HR: 104 (12-20-22 @ 08:56) (88 - 112)  BP: --  RR: 30 (12-20-22 @ 08:56) (15 - 36)  SpO2: 98% (12-20-22 @ 08:56) (95% - 100%)  Wt(kg): --        12-19-22 @ 07:01  -  12-20-22 @ 07:00  --------------------------------------------------------  IN: 1806.8 mL / OUT: 912 mL / NET: 894.8 mL    12-20-22 @ 07:01  -  12-20-22 @ 09:26  --------------------------------------------------------  IN: 140 mL / OUT: 0 mL / NET: 140 mL      Physical Exam:  	Gen: ill appearing male  	HEENT: Anicteric  	Pulm:  trach+  	CV: S1S2+  	Abd: Soft, +BS       	Ext: LE edema B/L+  	Neuro: Awake  	Skin: Warm and dry             Acces: CARMELO non tunneled HD catheter      LABS/STUDIES  --------------------------------------------------------------------------------              9.5    12.77 >-----------<  102      [12-20-22 @ 00:52]              31.2     136  |  102  |  34  ----------------------------<  181      [12-20-22 @ 00:52]  4.6   |  21  |  2.25        Ca     8.0     [12-20-22 @ 00:52]      Mg     3.0     [12-20-22 @ 00:52]      Phos  4.1     [12-20-22 @ 00:52]    TPro  7.0  /  Alb  3.0  /  TBili  0.8  /  DBili  x   /  AST  49  /  ALT  24  /  AlkPhos  113  [12-20-22 @ 00:52]      Creatinine Trend:  SCr 2.25 [12-20 @ 00:52]  SCr 1.40 [12-19 @ 00:34]  SCr 1.75 [12-18 @ 02:16]  SCr 1.41 [12-17 @ 00:12]  SCr 1.88 [12-16 @ 10:55]    Urinalysis - [12-16-22 @ 04:28]      Color DK BROWN / Appearance Turbid / SG 1.029 / pH 6.0      Gluc Trace / Ketone Negative  / Bili Negative / Urobili 6 mg/dL       Blood Large / Protein 300 mg/dL / Leuk Est Large / Nitrite Negative       /  / Hyaline 85 / Gran  / Sq Epi  / Non Sq Epi 8 / Bacteria Moderate      TSH 2.84      [12-10-22 @ 15:55]  Lipid: chol --, , HDL --, LDL --      [12-05-22 @ 00:50]       [facility] DIVISION OF KIDNEY DISEASES AND HYPERTENSION -- FOLLOW UP NOTE  --------------------------------------------------------------------------------  Chief Complaint: Acute pancreatitis without infection or necrosis    HPI:  61 y/o male with PMH of CABG, DM, HTN, HLD presenting as transfer from [place] for c/f STEMI. Course c/b gallstone pancreatitis leading to ARDS and shock & cardiac arrest now on VA ECMO. Had significant troponin elevation and his LVEF down to 8%. Pt was deemed to be too unstable to have an angiogram and potential PCI due to his co-morbidities & a new subdural bleed. Pt being seen for ERIC. SCr on arrival was 2.15; trended down to hector 1.6 on 11/25 and eventually increased to 3.95 11/28. Pt received IV diuretics as per CTU. Pt initiated on CRRT on 12/5/22 to optimize volume status and electrolytes.  Pt underwent decannulation of ECMO on 12/8/22. Pt was restarted on 12/9/22 for volume overload. Pt underwent mitral clip OR (12/16/22).  s/p trach on 12/16     HPI: Pt. seen this AM. CRRT stopped yesterday to wean to HD today. Off IV pressors. Breathing is ok but still coughing with secretions. No UOP.     PAST HISTORY  --------------------------------------------------------------------------------  No significant changes to PMH, PSH, FHx, SHx, unless otherwise noted    ALLERGIES & MEDICATIONS  --------------------------------------------------------------------------------  Allergies    No Known Allergies    Intolerances      Standing Inpatient Medications  aMIOdarone    Tablet 200 milliGRAM(s) Oral daily  aspirin  chewable 81 milliGRAM(s) Oral daily  atorvastatin 40 milliGRAM(s) Oral at bedtime  chlorhexidine 0.12% Liquid 15 milliLiter(s) Oral Mucosa every 12 hours  chlorhexidine 2% Cloths 1 Application(s) Topical <User Schedule>  CRRT Treatment    <Continuous>  heparin   Injectable 5000 Unit(s) SubCutaneous every 8 hours  heparin  Infusion Syringe 300 Unit(s)/Hr CRRT <Continuous>  insulin lispro (ADMELOG) corrective regimen sliding scale   SubCutaneous every 6 hours  multivitamin/minerals/iron Oral Solution (CENTRUM) 15 milliLiter(s) Oral daily  pantoprazole  Injectable 40 milliGRAM(s) IV Push daily  Phoxillum Filtration BK 4 / 2.5 5000 milliLiter(s) CRRT <Continuous>  Phoxillum Filtration BK 4 / 2.5 5000 milliLiter(s) CRRT <Continuous>  PrismaSOL Filtration BGK 4 / 2.5 5000 milliLiter(s) CRRT <Continuous>  thiamine 100 milliGRAM(s) Enteral Tube daily    PRN Inpatient Medications  acetaminophen    Suspension .. 650 milliGRAM(s) Oral every 6 hours PRN      REVIEW OF SYSTEMS  --------------------------------------------------------------------------------  As per HPI    VITALS/PHYSICAL EXAM  --------------------------------------------------------------------------------  T(C): 36.6 (12-20-22 @ 08:00), Max: 37.4 (12-20-22 @ 00:00)  HR: 104 (12-20-22 @ 08:56) (88 - 112)  BP: --  RR: 30 (12-20-22 @ 08:56) (15 - 36)  SpO2: 98% (12-20-22 @ 08:56) (95% - 100%)  Wt(kg): --        12-19-22 @ 07:01  -  12-20-22 @ 07:00  --------------------------------------------------------  IN: 1806.8 mL / OUT: 912 mL / NET: 894.8 mL    12-20-22 @ 07:01  -  12-20-22 @ 09:26  --------------------------------------------------------  IN: 140 mL / OUT: 0 mL / NET: 140 mL      Physical Exam:  	Gen: ill appearing male  	HEENT: Anicteric  	Pulm:  trach+  	CV: S1S2+  	Abd: Soft, +BS       	Ext: LE edema B/L+  	Neuro: Awake  	Skin: Warm and dry             Acces: CARMELO non tunneled HD catheter      LABS/STUDIES  --------------------------------------------------------------------------------              9.5    12.77 >-----------<  102      [12-20-22 @ 00:52]              31.2     136  |  102  |  34  ----------------------------<  181      [12-20-22 @ 00:52]  4.6   |  21  |  2.25        Ca     8.0     [12-20-22 @ 00:52]      Mg     3.0     [12-20-22 @ 00:52]      Phos  4.1     [12-20-22 @ 00:52]    TPro  7.0  /  Alb  3.0  /  TBili  0.8  /  DBili  x   /  AST  49  /  ALT  24  /  AlkPhos  113  [12-20-22 @ 00:52]      Creatinine Trend:  SCr 2.25 [12-20 @ 00:52]  SCr 1.40 [12-19 @ 00:34]  SCr 1.75 [12-18 @ 02:16]  SCr 1.41 [12-17 @ 00:12]  SCr 1.88 [12-16 @ 10:55]    Urinalysis - [12-16-22 @ 04:28]      Color DK BROWN / Appearance Turbid / SG 1.029 / pH 6.0      Gluc Trace / Ketone Negative  / Bili Negative / Urobili 6 mg/dL       Blood Large / Protein 300 mg/dL / Leuk Est Large / Nitrite Negative       /  / Hyaline 85 / Gran  / Sq Epi  / Non Sq Epi 8 / Bacteria Moderate      TSH 2.84      [12-10-22 @ 15:55]  Lipid: chol --, , HDL --, LDL --      [12-05-22 @ 00:50]

## 2022-12-21 DIAGNOSIS — I50.21 ACUTE SYSTOLIC (CONGESTIVE) HEART FAILURE: ICD-10-CM

## 2022-12-21 LAB
ALBUMIN SERPL ELPH-MCNC: 2.9 G/DL — LOW (ref 3.3–5)
ALP SERPL-CCNC: 105 U/L — SIGNIFICANT CHANGE UP (ref 40–120)
ALT FLD-CCNC: 34 U/L — SIGNIFICANT CHANGE UP (ref 10–45)
ANION GAP SERPL CALC-SCNC: 11 MMOL/L — SIGNIFICANT CHANGE UP (ref 5–17)
AST SERPL-CCNC: 57 U/L — HIGH (ref 10–40)
BILIRUB SERPL-MCNC: 0.8 MG/DL — SIGNIFICANT CHANGE UP (ref 0.2–1.2)
BUN SERPL-MCNC: 33 MG/DL — HIGH (ref 7–23)
CALCIUM SERPL-MCNC: 7.8 MG/DL — LOW (ref 8.4–10.5)
CHLORIDE SERPL-SCNC: 98 MMOL/L — SIGNIFICANT CHANGE UP (ref 96–108)
CO2 SERPL-SCNC: 25 MMOL/L — SIGNIFICANT CHANGE UP (ref 22–31)
CREAT SERPL-MCNC: 2.37 MG/DL — HIGH (ref 0.5–1.3)
CULTURE RESULTS: SIGNIFICANT CHANGE UP
EGFR: 30 ML/MIN/1.73M2 — LOW
GAS PNL BLDA: SIGNIFICANT CHANGE UP
GLUCOSE BLDC GLUCOMTR-MCNC: 150 MG/DL — HIGH (ref 70–99)
GLUCOSE BLDC GLUCOMTR-MCNC: 165 MG/DL — HIGH (ref 70–99)
GLUCOSE BLDC GLUCOMTR-MCNC: 170 MG/DL — HIGH (ref 70–99)
GLUCOSE BLDC GLUCOMTR-MCNC: 179 MG/DL — HIGH (ref 70–99)
GLUCOSE BLDC GLUCOMTR-MCNC: 188 MG/DL — HIGH (ref 70–99)
GLUCOSE SERPL-MCNC: 160 MG/DL — HIGH (ref 70–99)
HCT VFR BLD CALC: 29.1 % — LOW (ref 39–50)
HGB BLD-MCNC: 9.1 G/DL — LOW (ref 13–17)
MAGNESIUM SERPL-MCNC: 2.5 MG/DL — SIGNIFICANT CHANGE UP (ref 1.6–2.6)
MCHC RBC-ENTMCNC: 28.9 PG — SIGNIFICANT CHANGE UP (ref 27–34)
MCHC RBC-ENTMCNC: 31.3 GM/DL — LOW (ref 32–36)
MCV RBC AUTO: 92.4 FL — SIGNIFICANT CHANGE UP (ref 80–100)
NRBC # BLD: 0 /100 WBCS — SIGNIFICANT CHANGE UP (ref 0–0)
PHOSPHATE SERPL-MCNC: 3.6 MG/DL — SIGNIFICANT CHANGE UP (ref 2.5–4.5)
PLATELET # BLD AUTO: 69 K/UL — LOW (ref 150–400)
POTASSIUM SERPL-MCNC: 4.5 MMOL/L — SIGNIFICANT CHANGE UP (ref 3.5–5.3)
POTASSIUM SERPL-SCNC: 4.5 MMOL/L — SIGNIFICANT CHANGE UP (ref 3.5–5.3)
PREALB SERPL-MCNC: 14 MG/DL — LOW (ref 20–40)
PROT SERPL-MCNC: 6.6 G/DL — SIGNIFICANT CHANGE UP (ref 6–8.3)
RBC # BLD: 3.15 M/UL — LOW (ref 4.2–5.8)
RBC # FLD: 18.6 % — HIGH (ref 10.3–14.5)
SODIUM SERPL-SCNC: 134 MMOL/L — LOW (ref 135–145)
SPECIMEN SOURCE: SIGNIFICANT CHANGE UP
WBC # BLD: 14.66 K/UL — HIGH (ref 3.8–10.5)
WBC # FLD AUTO: 14.66 K/UL — HIGH (ref 3.8–10.5)

## 2022-12-21 PROCEDURE — 99233 SBSQ HOSP IP/OBS HIGH 50: CPT | Mod: GC

## 2022-12-21 PROCEDURE — 71045 X-RAY EXAM CHEST 1 VIEW: CPT | Mod: 26

## 2022-12-21 PROCEDURE — 90935 HEMODIALYSIS ONE EVALUATION: CPT | Mod: GC

## 2022-12-21 PROCEDURE — 99291 CRITICAL CARE FIRST HOUR: CPT

## 2022-12-21 PROCEDURE — 99231 SBSQ HOSP IP/OBS SF/LOW 25: CPT

## 2022-12-21 RX ORDER — CHLORPROMAZINE HCL 10 MG
25 TABLET ORAL ONCE
Refills: 0 | Status: COMPLETED | OUTPATIENT
Start: 2022-12-21 | End: 2022-12-21

## 2022-12-21 RX ADMIN — Medication 2: at 23:58

## 2022-12-21 RX ADMIN — AMIODARONE HYDROCHLORIDE 200 MILLIGRAM(S): 400 TABLET ORAL at 05:12

## 2022-12-21 RX ADMIN — CHLORHEXIDINE GLUCONATE 15 MILLILITER(S): 213 SOLUTION TOPICAL at 17:54

## 2022-12-21 RX ADMIN — Medication 15 MILLILITER(S): at 12:22

## 2022-12-21 RX ADMIN — Medication 12.5 MILLIGRAM(S): at 05:12

## 2022-12-21 RX ADMIN — Medication 2: at 05:12

## 2022-12-21 RX ADMIN — CHLORHEXIDINE GLUCONATE 1 APPLICATION(S): 213 SOLUTION TOPICAL at 05:12

## 2022-12-21 RX ADMIN — Medication 81 MILLIGRAM(S): at 12:22

## 2022-12-21 RX ADMIN — HEPARIN SODIUM 5000 UNIT(S): 5000 INJECTION INTRAVENOUS; SUBCUTANEOUS at 09:25

## 2022-12-21 RX ADMIN — HEPARIN SODIUM 5000 UNIT(S): 5000 INJECTION INTRAVENOUS; SUBCUTANEOUS at 01:27

## 2022-12-21 RX ADMIN — CHLORHEXIDINE GLUCONATE 15 MILLILITER(S): 213 SOLUTION TOPICAL at 05:12

## 2022-12-21 RX ADMIN — Medication 100 MILLIGRAM(S): at 12:22

## 2022-12-21 RX ADMIN — Medication 500 MICROGRAM(S): at 05:37

## 2022-12-21 RX ADMIN — HEPARIN SODIUM 5000 UNIT(S): 5000 INJECTION INTRAVENOUS; SUBCUTANEOUS at 17:54

## 2022-12-21 RX ADMIN — Medication 102 MILLIGRAM(S): at 17:37

## 2022-12-21 RX ADMIN — Medication 4 MILLILITER(S): at 05:37

## 2022-12-21 RX ADMIN — Medication 4 MILLILITER(S): at 11:21

## 2022-12-21 RX ADMIN — PANTOPRAZOLE SODIUM 40 MILLIGRAM(S): 20 TABLET, DELAYED RELEASE ORAL at 12:22

## 2022-12-21 RX ADMIN — Medication 500 MICROGRAM(S): at 11:20

## 2022-12-21 RX ADMIN — CHLORHEXIDINE GLUCONATE 1 APPLICATION(S): 213 SOLUTION TOPICAL at 05:28

## 2022-12-21 RX ADMIN — Medication 2: at 17:53

## 2022-12-21 RX ADMIN — ATORVASTATIN CALCIUM 40 MILLIGRAM(S): 80 TABLET, FILM COATED ORAL at 21:04

## 2022-12-21 NOTE — CONSULT NOTE ADULT - ASSESSMENT
62 year old male eventually requiring HD. Vascular surgery consulted for AVF planning.    Plan:  - Obtain bilateral upper extremity venous mapping  - Protect LUE (no IVs, no lines, no blood draws, no BPs, etc)  - Would appreciate medical and cardiac optimization for procedure, especially once patient is downgraded from ICU to floor    Discussed with vascular surgery fellow, Dr. Monge, on behalf of vascular surgery attending, Dr. West.      Vascular Surgery  [id number]

## 2022-12-21 NOTE — PROGRESS NOTE ADULT - PROBLEM SELECTOR PLAN 4
- CT abd showing acute pancreatitis, RUQ showing sludge, no stones  - lipase > 3000 11/24, normalized prior and now fluctuating  - appreciate GI recs, no intervention planned   - surgery following, no current interventions at this time.

## 2022-12-21 NOTE — PROGRESS NOTE ADULT - ATTENDING COMMENTS
61 y/o male with prolonged hospital course in setting of pancreatitis, cardiogenic shock requiring tMCS, respiratory failure s/p trach, ICMP and ERIC on CKD. He remains very debilitated. He is currently off inotropes and has stable BP.   Clinically looks mildly hypervolemic, warm extremities.   Agree with gentle GDMT optimization and aiming for negative I/O 24h balance.   Plan for extra UF session today.   Consider hydralazine for afterload reduction.  We will follow intermittently. Please call us if any further issues/concerns. 63 y/o male with prolonged hospital course in setting of pancreatitis, cardiogenic shock requiring tMCS, respiratory failure s/p trach, ICMP and ERIC on CKD. He remains very debilitated. He is currently off inotropes and has stable BP.   Clinically looks mildly hypervolemic, warm extremities.   Agree with gentle GDMT optimization and aiming for negative I/O 24h balance.   Plan for extra UF session today.   Consider hydralazine for afterload reduction.  We will follow intermittently. Please call us if any further issues/concerns.

## 2022-12-21 NOTE — PROGRESS NOTE ADULT - SUBJECTIVE AND OBJECTIVE BOX
Patient seen and examined at the bedside.    Remained critically ill on continuous ICU monitoring.      Brief Summary:  61 yo M admitted with cardiogenic shock s/p VA ECMO, decannulated 12/8/22.      24 Hour events:      OBJECTIVE:  Vital Signs Last 24 Hrs  T(C): 36.9 (21 Dec 2022 04:00), Max: 36.9 (21 Dec 2022 04:00)  T(F): 98.4 (21 Dec 2022 04:00), Max: 98.4 (21 Dec 2022 04:00)  HR: 57 (21 Dec 2022 06:46) (57 - 111)  BP: --  BP(mean): --  RR: 27 (21 Dec 2022 06:00) (16 - 43)  SpO2: 100% (21 Dec 2022 06:46) (94% - 100%)    Parameters below as of 21 Dec 2022 05:48  Patient On (Oxygen Delivery Method): tracheostomy collar  O2 Flow (L/min): 10  O2 Concentration (%): 40    Mode: AC/ CMV (Assist Control/ Continuous Mandatory Ventilation)  RR (machine): 12  TV (machine): 500  FiO2: 50  PEEP: 5  ITime: 0.1  MAP: 8  PIP: 20              Physical Exam:   General: OOB to chair, no acute distress  Neurology: Following commands  Eyes: bilateral pupils equal and reactive   ENT/Neck: +Trach, Neck supple  Respiratory: Clear bilaterally   CV: S1S2, Sinus   Abdominal: Soft, NT, ND +BS   Extremities: Warm     -------------------------------------------------------------------------------------------------------------------------------                        9.1    14.66 )-----------( 69       ( 21 Dec 2022 00:39 )             29.1     12-21    134<L>  |  98  |  33<H>  ----------------------------<  160<H>  4.5   |  25  |  2.37<H>    Ca    7.8<L>      21 Dec 2022 00:38  Phos  3.6     12-21  Mg     2.5     12-21    TPro  6.6  /  Alb  2.9<L>  /  TBili  0.8  /  DBili  x   /  AST  57<H>  /  ALT  34  /  AlkPhos  105  12-21    LIVER FUNCTIONS - ( 21 Dec 2022 00:38 )  Alb: 2.9 g/dL / Pro: 6.6 g/dL / ALK PHOS: 105 U/L / ALT: 34 U/L / AST: 57 U/L / GGT: x             ABG - ( 21 Dec 2022 00:00 )  pH, Arterial: 7.45  pH, Blood: x     /  pCO2: 38    /  pO2: 117   / HCO3: 26    / Base Excess: 2.3   /  SaO2: 98.0              ------------------------------------------------------------------------------------------------------------------------------  Assessment:  61 y/o male with PMH of CABG, DM, HTN, HLD presenting as transfer from [place] for c/f STEMI.     Cardiogenic shock s/p VA ECMO decannulated 12/8  Mitral regurgitation s/p Mitral clip x1 12/16/22  Acute respiratory distress/failure s/p Tracheostomy 12/16/22   IABP placed ( IABP dc'ed 12/17)   At high risk for hemodynamic instability  ERIC  Thrombocytopenia  Leukocytosis  Anemia  Acute pancreatitis    Plan:   ***Neuro***  CT head showed 4mm subdural hematoma 11/26: repeat CT head unchanged 12/01  Soft palate laceration, ENT scoped 12/8, stable, no acute intervention at this time  Repeat Head CT 12/13 Similar minimal increased density along the right aspect of the posterior   falx and right tentorial leaf, consistent with minimal residual subdural   hemorrhage.  No parenchymal hemorrhage or brain edema.  Acute pain control with Tylenol and Dilaudid   PT ongoing.    ***Cardiovascular***  At high risk for hemodynamic instability and cardiac arrhythmias.  Off inotropes and pressors currently.  TTE 12/19 showed EF 24% with normal RV function  Started on Lopressor  SQ Heparin for VTE prophylaxis   PO Amiodarone for rate control  ASA /Statin daily    ***Pulmonary***  Respiratory failure s/p Tracheostomy - continue TC trials  Wean ventilator as tolerated.   Deep breathing and coughing exercises.  Wean oxygen as able.    ***GI***  Protein calorie malnutrition - Tube feeds via nasal feeding tube  Pancreatitis - Amylase and lipase elevated, will trend.  Protonix for stress ulcer prophylaxis     ***Renal***  ERIC, renal following, transition from CRRT to HD  Replete electrolytes as indicated.    ***ID***  Leukocytosis - off antibiotics currently.  Appreciate ID input    ***Endocrine***  Hx of DM2, continue glycemic control w/ ISS.      Patient requires continuous monitoring with bedside rhythm monitoring, pulse oximetry monitoring, and continuous central venous and arterial pressure monitoring; and intermittent blood gas analysis. Care plan discussed with the ICU care team.   Patient remained critical, at risk for life threatening decompensation.    I have spent 30 minutes providing critical care management to this patient.    By signing my name below, I, Maria D'Amico, attest that this documentation has been prepared under the direction and in the presence of Jade Rosas MD  Electronically signed: Maria D'Amico, Scribe, 12-21-22 @ 07:33    I, Jade Rosas MD, personally performed the services described in this documentation. all medical record entries made by the karlaibe were at my direction and in my presence. I have reviewed the chart and agree that the record reflects my personal performance and is accurate and complete  Electronically signed: Jade Rosas MD Patient seen and examined at the bedside.    Remained critically ill on continuous ICU monitoring.      Brief Summary:  63 yo M admitted with cardiogenic shock s/p VA ECMO, decannulated 12/8/22.      24 Hour events:      OBJECTIVE:  Vital Signs Last 24 Hrs  T(C): 36.9 (21 Dec 2022 04:00), Max: 36.9 (21 Dec 2022 04:00)  T(F): 98.4 (21 Dec 2022 04:00), Max: 98.4 (21 Dec 2022 04:00)  HR: 57 (21 Dec 2022 06:46) (57 - 111)  BP: --  BP(mean): --  RR: 27 (21 Dec 2022 06:00) (16 - 43)  SpO2: 100% (21 Dec 2022 06:46) (94% - 100%)    Parameters below as of 21 Dec 2022 05:48  Patient On (Oxygen Delivery Method): tracheostomy collar  O2 Flow (L/min): 10  O2 Concentration (%): 40    Mode: AC/ CMV (Assist Control/ Continuous Mandatory Ventilation)  RR (machine): 12  TV (machine): 500  FiO2: 50  PEEP: 5  ITime: 0.1  MAP: 8  PIP: 20              Physical Exam:   General: OOB to chair, no acute distress  Neurology: Following commands  Eyes: bilateral pupils equal and reactive   ENT/Neck: +Trach, Neck supple  Respiratory: Clear bilaterally   CV: S1S2, Sinus   Abdominal: Soft, NT, ND +BS   Extremities: Warm     -------------------------------------------------------------------------------------------------------------------------------                        9.1    14.66 )-----------( 69       ( 21 Dec 2022 00:39 )             29.1     12-21    134<L>  |  98  |  33<H>  ----------------------------<  160<H>  4.5   |  25  |  2.37<H>    Ca    7.8<L>      21 Dec 2022 00:38  Phos  3.6     12-21  Mg     2.5     12-21    TPro  6.6  /  Alb  2.9<L>  /  TBili  0.8  /  DBili  x   /  AST  57<H>  /  ALT  34  /  AlkPhos  105  12-21    LIVER FUNCTIONS - ( 21 Dec 2022 00:38 )  Alb: 2.9 g/dL / Pro: 6.6 g/dL / ALK PHOS: 105 U/L / ALT: 34 U/L / AST: 57 U/L / GGT: x             ABG - ( 21 Dec 2022 00:00 )  pH, Arterial: 7.45  pH, Blood: x     /  pCO2: 38    /  pO2: 117   / HCO3: 26    / Base Excess: 2.3   /  SaO2: 98.0              ------------------------------------------------------------------------------------------------------------------------------  Assessment:  63 y/o male with PMH of CABG, DM, HTN, HLD presenting as transfer from [place] for c/f STEMI.     Cardiogenic shock s/p VA ECMO decannulated 12/8  Mitral regurgitation s/p Mitral clip x1 12/16/22  Acute respiratory distress/failure s/p Tracheostomy 12/16/22   IABP placed ( IABP dc'ed 12/17)   At high risk for hemodynamic instability  ERIC  Thrombocytopenia  Leukocytosis  Anemia  Acute pancreatitis    Plan:   ***Neuro***  CT head showed 4mm subdural hematoma 11/26: repeat CT head unchanged 12/01  Soft palate laceration, ENT scoped 12/8, stable, no acute intervention at this time  Repeat Head CT 12/13 Similar minimal increased density along the right aspect of the posterior   falx and right tentorial leaf, consistent with minimal residual subdural   hemorrhage.  No parenchymal hemorrhage or brain edema.  Acute pain control with Tylenol and Dilaudid   PT ongoing.    ***Cardiovascular***  At high risk for hemodynamic instability and cardiac arrhythmias.  Off inotropes and pressors currently.  TTE 12/19 showed EF 24% with normal RV function  Started on Lopressor  SQ Heparin for VTE prophylaxis   PO Amiodarone for rate control  ASA /Statin daily    ***Pulmonary***  Respiratory failure s/p Tracheostomy - continue TC trials  Wean ventilator as tolerated.   Deep breathing and coughing exercises.  Wean oxygen as able.    ***GI***  Protein calorie malnutrition - Tube feeds via nasal feeding tube  Pancreatitis - Amylase and lipase elevated, will trend.  Protonix for stress ulcer prophylaxis     ***Renal***  ERIC, renal following, transition from CRRT to HD  Replete electrolytes as indicated.    ***ID***  Leukocytosis - off antibiotics currently.  Appreciate ID input    ***Endocrine***  Hx of DM2, continue glycemic control w/ ISS.      Patient requires continuous monitoring with bedside rhythm monitoring, pulse oximetry monitoring, and continuous central venous and arterial pressure monitoring; and intermittent blood gas analysis. Care plan discussed with the ICU care team.   Patient remained critical, at risk for life threatening decompensation.    I have spent 30 minutes providing critical care management to this patient.    By signing my name below, I, Maria D'Amico, attest that this documentation has been prepared under the direction and in the presence of Jade Rosas MD  Electronically signed: Maria D'Amico, Scribe, 12-21-22 @ 07:33    I, Jade Rosas MD, personally performed the services described in this documentation. all medical record entries made by the karlaibe were at my direction and in my presence. I have reviewed the chart and agree that the record reflects my personal performance and is accurate and complete  Electronically signed: Jade Rosas MD Patient seen and examined at the bedside.    Remained critically ill on continuous ICU monitoring.      Brief Summary:  61 yo M admitted with cardiogenic shock s/p VA ECMO, decannulated 12/8/22.      24 Hour events:      OBJECTIVE:  Vital Signs Last 24 Hrs  T(C): 36.9 (21 Dec 2022 04:00), Max: 36.9 (21 Dec 2022 04:00)  T(F): 98.4 (21 Dec 2022 04:00), Max: 98.4 (21 Dec 2022 04:00)  HR: 57 (21 Dec 2022 06:46) (57 - 111)  BP: --  BP(mean): --  RR: 27 (21 Dec 2022 06:00) (16 - 43)  SpO2: 100% (21 Dec 2022 06:46) (94% - 100%)    Parameters below as of 21 Dec 2022 05:48  Patient On (Oxygen Delivery Method): tracheostomy collar  O2 Flow (L/min): 10  O2 Concentration (%): 40    Mode: AC/ CMV (Assist Control/ Continuous Mandatory Ventilation)  RR (machine): 12  TV (machine): 500  FiO2: 50  PEEP: 5  ITime: 0.1  MAP: 8  PIP: 20              Physical Exam:   General: OOB to chair, no acute distress  Neurology: Following commands  Eyes: bilateral pupils equal and reactive   ENT/Neck: +Trach, Neck supple  Respiratory: Clear bilaterally   CV: S1S2, Sinus   Abdominal: Soft, NT, ND +BS   Extremities: Warm     -------------------------------------------------------------------------------------------------------------------------------                        9.1    14.66 )-----------( 69       ( 21 Dec 2022 00:39 )             29.1     12-21    134<L>  |  98  |  33<H>  ----------------------------<  160<H>  4.5   |  25  |  2.37<H>    Ca    7.8<L>      21 Dec 2022 00:38  Phos  3.6     12-21  Mg     2.5     12-21    TPro  6.6  /  Alb  2.9<L>  /  TBili  0.8  /  DBili  x   /  AST  57<H>  /  ALT  34  /  AlkPhos  105  12-21    LIVER FUNCTIONS - ( 21 Dec 2022 00:38 )  Alb: 2.9 g/dL / Pro: 6.6 g/dL / ALK PHOS: 105 U/L / ALT: 34 U/L / AST: 57 U/L / GGT: x             ABG - ( 21 Dec 2022 00:00 )  pH, Arterial: 7.45  pH, Blood: x     /  pCO2: 38    /  pO2: 117   / HCO3: 26    / Base Excess: 2.3   /  SaO2: 98.0              ------------------------------------------------------------------------------------------------------------------------------  Assessment:  63 y/o male with PMH of CABG, DM, HTN, HLD presenting as transfer from [place] for c/f STEMI.     Cardiogenic shock s/p VA ECMO decannulated 12/8  Mitral regurgitation s/p Mitral clip x1 12/16/22  Acute respiratory distress/failure s/p Tracheostomy 12/16/22   IABP placed ( IABP dc'ed 12/17)   At high risk for hemodynamic instability  ERIC  Thrombocytopenia  Leukocytosis  Anemia  Acute pancreatitis    Plan:   ***Neuro***  CT head showed 4mm subdural hematoma 11/26: repeat CT head unchanged 12/01  Soft palate laceration, ENT scoped 12/8, stable, no acute intervention at this time  Repeat Head CT 12/13 Similar minimal increased density along the right aspect of the posterior   falx and right tentorial leaf, consistent with minimal residual subdural   hemorrhage.  No parenchymal hemorrhage or brain edema.  Acute pain control with Tylenol and Dilaudid   PT ongoing.    ***Cardiovascular***  At high risk for hemodynamic instability and cardiac arrhythmias.  Off inotropes and pressors currently.  TTE 12/19 showed EF 24% with normal RV function  Started on Lopressor  SQ Heparin for VTE prophylaxis   PO Amiodarone for rate control  ASA /Statin daily    ***Pulmonary***  Respiratory failure s/p Tracheostomy - continue TC trials  Wean ventilator as tolerated.   Deep breathing and coughing exercises.  Wean oxygen as able.    ***GI***  Protein calorie malnutrition - Tube feeds via nasal feeding tube  Pancreatitis - Amylase and lipase elevated, will trend.  Protonix for stress ulcer prophylaxis     ***Renal***  ERIC, renal following, transition from CRRT to HD  Replete electrolytes as indicated.    ***ID***  Leukocytosis - off antibiotics currently.  Appreciate ID input    ***Endocrine***  Hx of DM2, continue glycemic control w/ ISS.      Patient requires continuous monitoring with bedside rhythm monitoring, pulse oximetry monitoring, and continuous central venous and arterial pressure monitoring; and intermittent blood gas analysis. Care plan discussed with the ICU care team.   Patient remained critical, at risk for life threatening decompensation.    I have spent 30 minutes providing critical care management to this patient.    By signing my name below, I, Maria D'Amico, attest that this documentation has been prepared under the direction and in the presence of Jade Rosas MD  Electronically signed: Maria D'Amico, Scribe, 12-21-22 @ 07:33    I, Jade Rosas MD, personally performed the services described in this documentation. all medical record entries made by the karlaibe were at my direction and in my presence. I have reviewed the chart and agree that the record reflects my personal performance and is accurate and complete  Electronically signed: Jade Rosas MD Patient seen and examined at the bedside.    Remained critically ill on continuous ICU monitoring.      Brief Summary:  63 yo M admitted with cardiogenic shock s/p VA ECMO, decannulated 12/8/22.      24 Hour events:  OOBTC  Trach collar trials as tolerated  HD yesterday 2L, plan for HD again today  Started on Lopressor 12.5 BID    OBJECTIVE:  Vital Signs Last 24 Hrs  T(C): 36.9 (21 Dec 2022 04:00), Max: 36.9 (21 Dec 2022 04:00)  T(F): 98.4 (21 Dec 2022 04:00), Max: 98.4 (21 Dec 2022 04:00)  HR: 57 (21 Dec 2022 06:46) (57 - 111)  BP: --  BP(mean): --  RR: 27 (21 Dec 2022 06:00) (16 - 43)  SpO2: 100% (21 Dec 2022 06:46) (94% - 100%)    Parameters below as of 21 Dec 2022 05:48  Patient On (Oxygen Delivery Method): tracheostomy collar  O2 Flow (L/min): 10  O2 Concentration (%): 40    Mode: AC/ CMV (Assist Control/ Continuous Mandatory Ventilation)  RR (machine): 12  TV (machine): 500  FiO2: 50  PEEP: 5  ITime: 0.1  MAP: 8  PIP: 20              Physical Exam:   General: OOB to chair, no acute distress  Neurology: Following commands  Eyes: bilateral pupils equal and reactive   ENT/Neck: +Trach, Neck supple  Respiratory: Clear bilaterally   CV: S1S2, Sinus   Abdominal: Soft, NT, ND +BS   Extremities: Warm     -------------------------------------------------------------------------------------------------------------------------------                        9.1    14.66 )-----------( 69       ( 21 Dec 2022 00:39 )             29.1     12-21    134<L>  |  98  |  33<H>  ----------------------------<  160<H>  4.5   |  25  |  2.37<H>    Ca    7.8<L>      21 Dec 2022 00:38  Phos  3.6     12-21  Mg     2.5     12-21    TPro  6.6  /  Alb  2.9<L>  /  TBili  0.8  /  DBili  x   /  AST  57<H>  /  ALT  34  /  AlkPhos  105  12-21    LIVER FUNCTIONS - ( 21 Dec 2022 00:38 )  Alb: 2.9 g/dL / Pro: 6.6 g/dL / ALK PHOS: 105 U/L / ALT: 34 U/L / AST: 57 U/L / GGT: x             ABG - ( 21 Dec 2022 00:00 )  pH, Arterial: 7.45  pH, Blood: x     /  pCO2: 38    /  pO2: 117   / HCO3: 26    / Base Excess: 2.3   /  SaO2: 98.0              ------------------------------------------------------------------------------------------------------------------------------  Assessment:  61 y/o male with PMH of CABG, DM, HTN, HLD presenting as transfer from [place] for c/f STEMI.     Cardiogenic shock s/p VA ECMO decannulated 12/8  Mitral regurgitation s/p Mitral clip x1 12/16/22  Acute respiratory distress/failure s/p Tracheostomy 12/16/22   IABP placed ( IABP dc'ed 12/17)   At high risk for hemodynamic instability  ERIC  Thrombocytopenia  Leukocytosis  Anemia  Acute pancreatitis    Plan:   ***Neuro***  CT head showed 4mm subdural hematoma 11/26: repeat CT head unchanged 12/01  Soft palate laceration, ENT scoped 12/8, stable, no acute intervention at this time  Repeat Head CT 12/13 Similar minimal increased density along the right aspect of the posterior   falx and right tentorial leaf, consistent with minimal residual subdural   hemorrhage.  No parenchymal hemorrhage or brain edema.  Acute pain control with Tylenol and Dilaudid   PT ongoing.    ***Cardiovascular***  At high risk for hemodynamic instability and cardiac arrhythmias.  Off inotropes and pressors currently.  TTE 12/19 showed EF 24% with normal RV function  Started on beta blocker Lopressor 12.5 BID   SQ Heparin for VTE prophylaxis   PO Amiodarone for rate control  ASA /Statin daily    ***Pulmonary***  Respiratory failure s/p Tracheostomy - continue TC trials  Wean ventilator as tolerated.   Deep breathing and coughing exercises.  Wean oxygen as able.    ***GI***  Protein calorie malnutrition - Tube feeds via nasal feeding tube  Pancreatitis - Amylase and lipase elevated, will trend.  Protonix for stress ulcer prophylaxis     ***Renal***  ERIC, renal following, transitioned from CRRT to HD  Replete electrolytes as indicated.  HD yesterday 2L, plan for HD today    ***ID***  Leukocytosis - off antibiotics currently.  Appreciate ID input    ***Endocrine***  Hx of DM2, continue glycemic control w/ ISS.      Patient requires continuous monitoring with bedside rhythm monitoring, pulse oximetry monitoring, and continuous central venous and arterial pressure monitoring; and intermittent blood gas analysis. Care plan discussed with the ICU care team.   Patient remained critical, at risk for life threatening decompensation.    I have spent 30 minutes providing critical care management to this patient.    By signing my name below, I, Maria D'Amico, attest that this documentation has been prepared under the direction and in the presence of Jade Rosas MD  Electronically signed: Maria D'Amico, Scribe, 12-21-22 @ 07:33    I, Jade Rosas MD, personally performed the services described in this documentation. all medical record entries made by the scribe were at my direction and in my presence. I have reviewed the chart and agree that the record reflects my personal performance and is accurate and complete  Electronically signed: Jade Rosas MD Patient seen and examined at the bedside.    Remained critically ill on continuous ICU monitoring.      Brief Summary:  61 yo M admitted with cardiogenic shock s/p VA ECMO, decannulated 12/8/22.      24 Hour events:  OOBTC  Trach collar trials as tolerated  HD yesterday 2L, plan for HD again today  Started on Lopressor 12.5 BID    OBJECTIVE:  Vital Signs Last 24 Hrs  T(C): 36.9 (21 Dec 2022 04:00), Max: 36.9 (21 Dec 2022 04:00)  T(F): 98.4 (21 Dec 2022 04:00), Max: 98.4 (21 Dec 2022 04:00)  HR: 57 (21 Dec 2022 06:46) (57 - 111)  BP: --  BP(mean): --  RR: 27 (21 Dec 2022 06:00) (16 - 43)  SpO2: 100% (21 Dec 2022 06:46) (94% - 100%)    Parameters below as of 21 Dec 2022 05:48  Patient On (Oxygen Delivery Method): tracheostomy collar  O2 Flow (L/min): 10  O2 Concentration (%): 40    Mode: AC/ CMV (Assist Control/ Continuous Mandatory Ventilation)  RR (machine): 12  TV (machine): 500  FiO2: 50  PEEP: 5  ITime: 0.1  MAP: 8  PIP: 20              Physical Exam:   General: OOB to chair, no acute distress  Neurology: Following commands  Eyes: bilateral pupils equal and reactive   ENT/Neck: +Trach, Neck supple  Respiratory: Clear bilaterally   CV: S1S2, Sinus   Abdominal: Soft, NT, ND +BS   Extremities: Warm     -------------------------------------------------------------------------------------------------------------------------------                        9.1    14.66 )-----------( 69       ( 21 Dec 2022 00:39 )             29.1     12-21    134<L>  |  98  |  33<H>  ----------------------------<  160<H>  4.5   |  25  |  2.37<H>    Ca    7.8<L>      21 Dec 2022 00:38  Phos  3.6     12-21  Mg     2.5     12-21    TPro  6.6  /  Alb  2.9<L>  /  TBili  0.8  /  DBili  x   /  AST  57<H>  /  ALT  34  /  AlkPhos  105  12-21    LIVER FUNCTIONS - ( 21 Dec 2022 00:38 )  Alb: 2.9 g/dL / Pro: 6.6 g/dL / ALK PHOS: 105 U/L / ALT: 34 U/L / AST: 57 U/L / GGT: x             ABG - ( 21 Dec 2022 00:00 )  pH, Arterial: 7.45  pH, Blood: x     /  pCO2: 38    /  pO2: 117   / HCO3: 26    / Base Excess: 2.3   /  SaO2: 98.0              ------------------------------------------------------------------------------------------------------------------------------  Assessment:  61 y/o male with PMH of CABG, DM, HTN, HLD presenting as transfer from [place] for c/f STEMI.     Cardiogenic shock s/p VA ECMO decannulated 12/8  Mitral regurgitation s/p Mitral clip x1 12/16/22  Acute respiratory distress/failure s/p Tracheostomy 12/16/22   IABP placed ( IABP dc'ed 12/17)   At high risk for hemodynamic instability  ERIC  Thrombocytopenia  Leukocytosis  Anemia  Acute pancreatitis    Plan:   ***Neuro***  CT head showed 4mm subdural hematoma 11/26: repeat CT head unchanged 12/01  Soft palate laceration, ENT scoped 12/8, stable, no acute intervention at this time  Repeat Head CT 12/13 Similar minimal increased density along the right aspect of the posterior   falx and right tentorial leaf, consistent with minimal residual subdural   hemorrhage.  No parenchymal hemorrhage or brain edema.  Acute pain control with Tylenol and Dilaudid   PT ongoing.    ***Cardiovascular***  At high risk for hemodynamic instability and cardiac arrhythmias.  Off inotropes and pressors currently.  TTE 12/19 showed EF 24% with normal RV function  Started on beta blocker Lopressor 12.5 BID   SQ Heparin for VTE prophylaxis   PO Amiodarone for rate control  ASA /Statin daily    ***Pulmonary***  Respiratory failure s/p Tracheostomy - continue TC trials  Wean ventilator as tolerated.   Deep breathing and coughing exercises.  Wean oxygen as able.    ***GI***  Protein calorie malnutrition - Tube feeds via nasal feeding tube  Pancreatitis - Amylase and lipase elevated, will trend.  Protonix for stress ulcer prophylaxis     ***Renal***  ERIC, renal following, transitioned from CRRT to HD  Replete electrolytes as indicated.  HD yesterday 2L, plan for HD today    ***ID***  Leukocytosis - off antibiotics currently.  Appreciate ID input    ***Endocrine***  Hx of DM2, continue glycemic control w/ ISS.      Patient requires continuous monitoring with bedside rhythm monitoring, pulse oximetry monitoring, and continuous central venous and arterial pressure monitoring; and intermittent blood gas analysis. Care plan discussed with the ICU care team.   Patient remained critical, at risk for life threatening decompensation.    I have spent 30 minutes providing critical care management to this patient.    By signing my name below, I, Maria D'Amico, attest that this documentation has been prepared under the direction and in the presence of Jade Rosas MD  Electronically signed: Maria D'Amico, Scribe, 12-21-22 @ 07:33    I, Jade Rosas MD, personally performed the services described in this documentation. all medical record entries made by the scribe were at my direction and in my presence. I have reviewed the chart and agree that the record reflects my personal performance and is accurate and complete  Electronically signed: Jade Rosas MD Patient seen and examined at the bedside.    Remained critically ill on continuous ICU monitoring.      Brief Summary:  63 yo M admitted with cardiogenic shock s/p VA ECMO, decannulated 12/8/22.      24 Hour events:  OOBTC  Trach collar trials as tolerated  HD yesterday 2L, plan for HD again today  Started on Lopressor 12.5 BID    OBJECTIVE:  Vital Signs Last 24 Hrs  T(C): 36.9 (21 Dec 2022 04:00), Max: 36.9 (21 Dec 2022 04:00)  T(F): 98.4 (21 Dec 2022 04:00), Max: 98.4 (21 Dec 2022 04:00)  HR: 57 (21 Dec 2022 06:46) (57 - 111)  BP: --  BP(mean): --  RR: 27 (21 Dec 2022 06:00) (16 - 43)  SpO2: 100% (21 Dec 2022 06:46) (94% - 100%)    Parameters below as of 21 Dec 2022 05:48  Patient On (Oxygen Delivery Method): tracheostomy collar  O2 Flow (L/min): 10  O2 Concentration (%): 40    Mode: AC/ CMV (Assist Control/ Continuous Mandatory Ventilation)  RR (machine): 12  TV (machine): 500  FiO2: 50  PEEP: 5  ITime: 0.1  MAP: 8  PIP: 20              Physical Exam:   General: OOB to chair, no acute distress  Neurology: Following commands  Eyes: bilateral pupils equal and reactive   ENT/Neck: +Trach, Neck supple  Respiratory: Clear bilaterally   CV: S1S2, Sinus   Abdominal: Soft, NT, ND +BS   Extremities: Warm     -------------------------------------------------------------------------------------------------------------------------------                        9.1    14.66 )-----------( 69       ( 21 Dec 2022 00:39 )             29.1     12-21    134<L>  |  98  |  33<H>  ----------------------------<  160<H>  4.5   |  25  |  2.37<H>    Ca    7.8<L>      21 Dec 2022 00:38  Phos  3.6     12-21  Mg     2.5     12-21    TPro  6.6  /  Alb  2.9<L>  /  TBili  0.8  /  DBili  x   /  AST  57<H>  /  ALT  34  /  AlkPhos  105  12-21    LIVER FUNCTIONS - ( 21 Dec 2022 00:38 )  Alb: 2.9 g/dL / Pro: 6.6 g/dL / ALK PHOS: 105 U/L / ALT: 34 U/L / AST: 57 U/L / GGT: x             ABG - ( 21 Dec 2022 00:00 )  pH, Arterial: 7.45  pH, Blood: x     /  pCO2: 38    /  pO2: 117   / HCO3: 26    / Base Excess: 2.3   /  SaO2: 98.0              ------------------------------------------------------------------------------------------------------------------------------  Assessment:  63 y/o male with PMH of CABG, DM, HTN, HLD presenting as transfer from [place] for c/f STEMI.     Cardiogenic shock s/p VA ECMO decannulated 12/8  Mitral regurgitation s/p Mitral clip x1 12/16/22  Acute respiratory distress/failure s/p Tracheostomy 12/16/22   IABP placed ( IABP dc'ed 12/17)   At high risk for hemodynamic instability  ERIC  Thrombocytopenia  Leukocytosis  Anemia  Acute pancreatitis    Plan:   ***Neuro***  CT head showed 4mm subdural hematoma 11/26: repeat CT head unchanged 12/01  Soft palate laceration, ENT scoped 12/8, stable, no acute intervention at this time  Repeat Head CT 12/13 Similar minimal increased density along the right aspect of the posterior   falx and right tentorial leaf, consistent with minimal residual subdural   hemorrhage.  No parenchymal hemorrhage or brain edema.  Acute pain control with Tylenol and Dilaudid   PT ongoing.    ***Cardiovascular***  At high risk for hemodynamic instability and cardiac arrhythmias.  Off inotropes and pressors currently.  TTE 12/19 showed EF 24% with normal RV function  Started on beta blocker Lopressor 12.5 BID   SQ Heparin for VTE prophylaxis   PO Amiodarone for rate control  ASA /Statin daily    ***Pulmonary***  Respiratory failure s/p Tracheostomy - continue TC trials  Wean ventilator as tolerated.   Deep breathing and coughing exercises.  Wean oxygen as able.    ***GI***  Protein calorie malnutrition - Tube feeds via nasal feeding tube  Pancreatitis - Amylase and lipase elevated, will trend.  Protonix for stress ulcer prophylaxis     ***Renal***  ERIC, renal following, transitioned from CRRT to HD  Replete electrolytes as indicated.  HD yesterday 2L, plan for HD today    ***ID***  Leukocytosis - off antibiotics currently.  Appreciate ID input    ***Endocrine***  Hx of DM2, continue glycemic control w/ ISS.      Patient requires continuous monitoring with bedside rhythm monitoring, pulse oximetry monitoring, and continuous central venous and arterial pressure monitoring; and intermittent blood gas analysis. Care plan discussed with the ICU care team.   Patient remained critical, at risk for life threatening decompensation.    I have spent 30 minutes providing critical care management to this patient.    By signing my name below, I, Maria D'Amico, attest that this documentation has been prepared under the direction and in the presence of Jade Rosas MD  Electronically signed: Maria D'Amico, Scribe, 12-21-22 @ 07:33    I, Jade Rosas MD, personally performed the services described in this documentation. all medical record entries made by the scribe were at my direction and in my presence. I have reviewed the chart and agree that the record reflects my personal performance and is accurate and complete  Electronically signed: Jade Rosas MD Patient seen and examined at the bedside.    Remained critically ill on continuous ICU monitoring.      Brief Summary:  61 yo M admitted with cardiogenic shock s/p VA ECMO, decannulated 12/8/22.      24 Hour events:  OOBTC  Trach collar trials ongoing  HD yesterday 2L, plan for HD again today  Started on Lopressor 12.5 BID    OBJECTIVE:  Vital Signs Last 24 Hrs  T(C): 36.9 (21 Dec 2022 04:00), Max: 36.9 (21 Dec 2022 04:00)  T(F): 98.4 (21 Dec 2022 04:00), Max: 98.4 (21 Dec 2022 04:00)  HR: 57 (21 Dec 2022 06:46) (57 - 111)  BP: --  BP(mean): --  RR: 27 (21 Dec 2022 06:00) (16 - 43)  SpO2: 100% (21 Dec 2022 06:46) (94% - 100%)    Parameters below as of 21 Dec 2022 05:48  Patient On (Oxygen Delivery Method): tracheostomy collar  O2 Flow (L/min): 10  O2 Concentration (%): 40    Mode: AC/ CMV (Assist Control/ Continuous Mandatory Ventilation)  RR (machine): 12  TV (machine): 500  FiO2: 50  PEEP: 5  ITime: 0.1  MAP: 8  PIP: 20              Physical Exam:   General: OOB to chair, no acute distress  Neurology: Following commands  Eyes: Bilateral pupils equal and reactive   ENT/Neck: +Trach, Neck supple  Respiratory: Clear bilaterally   CV: S1S2, Sinus   Abdominal: Soft, NT, ND +BS   Extremities: Warm     -------------------------------------------------------------------------------------------------------------------------------                        9.1    14.66 )-----------( 69       ( 21 Dec 2022 00:39 )             29.1     12-21    134<L>  |  98  |  33<H>  ----------------------------<  160<H>  4.5   |  25  |  2.37<H>    Ca    7.8<L>      21 Dec 2022 00:38  Phos  3.6     12-21  Mg     2.5     12-21    TPro  6.6  /  Alb  2.9<L>  /  TBili  0.8  /  DBili  x   /  AST  57<H>  /  ALT  34  /  AlkPhos  105  12-21    LIVER FUNCTIONS - ( 21 Dec 2022 00:38 )  Alb: 2.9 g/dL / Pro: 6.6 g/dL / ALK PHOS: 105 U/L / ALT: 34 U/L / AST: 57 U/L / GGT: x             ABG - ( 21 Dec 2022 00:00 )  pH, Arterial: 7.45  pH, Blood: x     /  pCO2: 38    /  pO2: 117   / HCO3: 26    / Base Excess: 2.3   /  SaO2: 98.0              ------------------------------------------------------------------------------------------------------------------------------  Assessment:  61 y/o male with PMH of CABG, DM, HTN, HLD presenting as transfer from [place] for c/f STEMI.     Cardiogenic shock s/p VA ECMO decannulated 12/8  Mitral regurgitation s/p Mitral clip x1 12/16/22  Acute respiratory distress/failure s/p Tracheostomy 12/16/22   IABP placed ( IABP dc'ed 12/17)   At high risk for hemodynamic instability  ERIC  Thrombocytopenia  Leukocytosis  Anemia  Acute pancreatitis    Plan:   ***Neuro***  CT head showed 4mm subdural hematoma 11/26: repeat CT head unchanged 12/01  Soft palate laceration, ENT scoped 12/8, stable, no acute intervention at this time  Repeat Head CT 12/13 Similar minimal increased density along the right aspect of the posterior   falx and right tentorial leaf, consistent with minimal residual subdural   hemorrhage.  No parenchymal hemorrhage or brain edema.  Acute pain control with Tylenol and prns  PT ongoing.    ***Cardiovascular***  At high risk for hemodynamic instability and cardiac arrhythmias.  Off inotropes and pressors currently.  TTE 12/19 showed EF 24% with normal RV function  Started on beta blocker Lopressor 12.5 BID   SQ Heparin for VTE prophylaxis   PO Amiodarone  ASA /Statin daily    ***Pulmonary***  Respiratory failure s/p Tracheostomy - continue TC trials  Wean ventilator as tolerated.   Deep breathing and coughing exercises.  Wean oxygen as able.    ***GI***  Protein calorie malnutrition - Tube feeds via nasal feeding tube  Pancreatitis - Amylase and lipase elevated, will trend.  Protonix for stress ulcer prophylaxis     ***Renal***  ERIC, renal following, tolerating HD  Replete electrolytes as indicated.    ***ID***  Leukocytosis - off antibiotics currently, will trend.  Appreciate ID input    ***Endocrine***  Hx of DM2, continue glycemic control w/ ISS.    ***Hematology***  Acute blood loss anemia and thrombocytopenia - no transfusion indication currently        Patient requires continuous monitoring with bedside rhythm monitoring, pulse oximetry monitoring, and continuous central venous and arterial pressure monitoring; and intermittent blood gas analysis. Care plan discussed with the ICU care team.   Patient remained critical, at risk for life threatening decompensation.    I have spent 35 minutes providing critical care management to this patient.    By signing my name below, I, Maria D'Amico, attest that this documentation has been prepared under the direction and in the presence of Jade Rosas MD  Electronically signed: Maria D'Amico, Scribe, 12-21-22 @ 07:33    I, Jade Rosas MD, personally performed the services described in this documentation. All medical record entries made by the karlaibkyle were at my direction and in my presence. I have reviewed the chart and agree that the record reflects my personal performance and is accurate and complete  Electronically signed: Jade Rosas MD Patient seen and examined at the bedside.    Remained critically ill on continuous ICU monitoring.      Brief Summary:  63 yo M admitted with cardiogenic shock s/p VA ECMO, decannulated 12/8/22.      24 Hour events:  OOBTC  Trach collar trials ongoing  HD yesterday 2L, plan for HD again today  Started on Lopressor 12.5 BID    OBJECTIVE:  Vital Signs Last 24 Hrs  T(C): 36.9 (21 Dec 2022 04:00), Max: 36.9 (21 Dec 2022 04:00)  T(F): 98.4 (21 Dec 2022 04:00), Max: 98.4 (21 Dec 2022 04:00)  HR: 57 (21 Dec 2022 06:46) (57 - 111)  BP: --  BP(mean): --  RR: 27 (21 Dec 2022 06:00) (16 - 43)  SpO2: 100% (21 Dec 2022 06:46) (94% - 100%)    Parameters below as of 21 Dec 2022 05:48  Patient On (Oxygen Delivery Method): tracheostomy collar  O2 Flow (L/min): 10  O2 Concentration (%): 40    Mode: AC/ CMV (Assist Control/ Continuous Mandatory Ventilation)  RR (machine): 12  TV (machine): 500  FiO2: 50  PEEP: 5  ITime: 0.1  MAP: 8  PIP: 20              Physical Exam:   General: OOB to chair, no acute distress  Neurology: Following commands  Eyes: Bilateral pupils equal and reactive   ENT/Neck: +Trach, Neck supple  Respiratory: Clear bilaterally   CV: S1S2, Sinus   Abdominal: Soft, NT, ND +BS   Extremities: Warm     -------------------------------------------------------------------------------------------------------------------------------                        9.1    14.66 )-----------( 69       ( 21 Dec 2022 00:39 )             29.1     12-21    134<L>  |  98  |  33<H>  ----------------------------<  160<H>  4.5   |  25  |  2.37<H>    Ca    7.8<L>      21 Dec 2022 00:38  Phos  3.6     12-21  Mg     2.5     12-21    TPro  6.6  /  Alb  2.9<L>  /  TBili  0.8  /  DBili  x   /  AST  57<H>  /  ALT  34  /  AlkPhos  105  12-21    LIVER FUNCTIONS - ( 21 Dec 2022 00:38 )  Alb: 2.9 g/dL / Pro: 6.6 g/dL / ALK PHOS: 105 U/L / ALT: 34 U/L / AST: 57 U/L / GGT: x             ABG - ( 21 Dec 2022 00:00 )  pH, Arterial: 7.45  pH, Blood: x     /  pCO2: 38    /  pO2: 117   / HCO3: 26    / Base Excess: 2.3   /  SaO2: 98.0              ------------------------------------------------------------------------------------------------------------------------------  Assessment:  61 y/o male with PMH of CABG, DM, HTN, HLD presenting as transfer from [place] for c/f STEMI.     Cardiogenic shock s/p VA ECMO decannulated 12/8  Mitral regurgitation s/p Mitral clip x1 12/16/22  Acute respiratory distress/failure s/p Tracheostomy 12/16/22   IABP placed ( IABP dc'ed 12/17)   At high risk for hemodynamic instability  ERIC  Thrombocytopenia  Leukocytosis  Anemia  Acute pancreatitis    Plan:   ***Neuro***  CT head showed 4mm subdural hematoma 11/26: repeat CT head unchanged 12/01  Soft palate laceration, ENT scoped 12/8, stable, no acute intervention at this time  Repeat Head CT 12/13 Similar minimal increased density along the right aspect of the posterior   falx and right tentorial leaf, consistent with minimal residual subdural   hemorrhage.  No parenchymal hemorrhage or brain edema.  Acute pain control with Tylenol and prns  PT ongoing.    ***Cardiovascular***  At high risk for hemodynamic instability and cardiac arrhythmias.  Off inotropes and pressors currently.  TTE 12/19 showed EF 24% with normal RV function  Started on beta blocker Lopressor 12.5 BID   SQ Heparin for VTE prophylaxis   PO Amiodarone  ASA /Statin daily    ***Pulmonary***  Respiratory failure s/p Tracheostomy - continue TC trials  Wean ventilator as tolerated.   Deep breathing and coughing exercises.  Wean oxygen as able.    ***GI***  Protein calorie malnutrition - Tube feeds via nasal feeding tube  Pancreatitis - Amylase and lipase elevated, will trend.  Protonix for stress ulcer prophylaxis     ***Renal***  ERIC, renal following, tolerating HD  Replete electrolytes as indicated.    ***ID***  Leukocytosis - off antibiotics currently, will trend.  Appreciate ID input    ***Endocrine***  Hx of DM2, continue glycemic control w/ ISS.    ***Hematology***  Acute blood loss anemia and thrombocytopenia - no transfusion indication currently        Patient requires continuous monitoring with bedside rhythm monitoring, pulse oximetry monitoring, and continuous central venous and arterial pressure monitoring; and intermittent blood gas analysis. Care plan discussed with the ICU care team.   Patient remained critical, at risk for life threatening decompensation.    I have spent 35 minutes providing critical care management to this patient.    By signing my name below, I, Maria D'Amico, attest that this documentation has been prepared under the direction and in the presence of Jade Rosas MD  Electronically signed: Maria D'Amico, Scribe, 12-21-22 @ 07:33    I, Jade Rosas MD, personally performed the services described in this documentation. All medical record entries made by the karlaibkyle were at my direction and in my presence. I have reviewed the chart and agree that the record reflects my personal performance and is accurate and complete  Electronically signed: Jade Rosas MD Patient seen and examined at the bedside.    Remained critically ill on continuous ICU monitoring.      Brief Summary:  61 yo M admitted with cardiogenic shock s/p VA ECMO, decannulated 12/8/22.      24 Hour events:  OOBTC  Trach collar trials ongoing  HD yesterday 2L, plan for HD again today  Started on Lopressor 12.5 BID    OBJECTIVE:  Vital Signs Last 24 Hrs  T(C): 36.9 (21 Dec 2022 04:00), Max: 36.9 (21 Dec 2022 04:00)  T(F): 98.4 (21 Dec 2022 04:00), Max: 98.4 (21 Dec 2022 04:00)  HR: 57 (21 Dec 2022 06:46) (57 - 111)  BP: --  BP(mean): --  RR: 27 (21 Dec 2022 06:00) (16 - 43)  SpO2: 100% (21 Dec 2022 06:46) (94% - 100%)    Parameters below as of 21 Dec 2022 05:48  Patient On (Oxygen Delivery Method): tracheostomy collar  O2 Flow (L/min): 10  O2 Concentration (%): 40    Mode: AC/ CMV (Assist Control/ Continuous Mandatory Ventilation)  RR (machine): 12  TV (machine): 500  FiO2: 50  PEEP: 5  ITime: 0.1  MAP: 8  PIP: 20              Physical Exam:   General: OOB to chair, no acute distress  Neurology: Following commands  Eyes: Bilateral pupils equal and reactive   ENT/Neck: +Trach, Neck supple  Respiratory: Clear bilaterally   CV: S1S2, Sinus   Abdominal: Soft, NT, ND +BS   Extremities: Warm     -------------------------------------------------------------------------------------------------------------------------------                        9.1    14.66 )-----------( 69       ( 21 Dec 2022 00:39 )             29.1     12-21    134<L>  |  98  |  33<H>  ----------------------------<  160<H>  4.5   |  25  |  2.37<H>    Ca    7.8<L>      21 Dec 2022 00:38  Phos  3.6     12-21  Mg     2.5     12-21    TPro  6.6  /  Alb  2.9<L>  /  TBili  0.8  /  DBili  x   /  AST  57<H>  /  ALT  34  /  AlkPhos  105  12-21    LIVER FUNCTIONS - ( 21 Dec 2022 00:38 )  Alb: 2.9 g/dL / Pro: 6.6 g/dL / ALK PHOS: 105 U/L / ALT: 34 U/L / AST: 57 U/L / GGT: x             ABG - ( 21 Dec 2022 00:00 )  pH, Arterial: 7.45  pH, Blood: x     /  pCO2: 38    /  pO2: 117   / HCO3: 26    / Base Excess: 2.3   /  SaO2: 98.0              ------------------------------------------------------------------------------------------------------------------------------  Assessment:  63 y/o male with PMH of CABG, DM, HTN, HLD presenting as transfer from [place] for c/f STEMI.     Cardiogenic shock s/p VA ECMO decannulated 12/8  Mitral regurgitation s/p Mitral clip x1 12/16/22  Acute respiratory distress/failure s/p Tracheostomy 12/16/22   IABP placed ( IABP dc'ed 12/17)   At high risk for hemodynamic instability  ERIC  Thrombocytopenia  Leukocytosis  Anemia  Acute pancreatitis    Plan:   ***Neuro***  CT head showed 4mm subdural hematoma 11/26: repeat CT head unchanged 12/01  Soft palate laceration, ENT scoped 12/8, stable, no acute intervention at this time  Repeat Head CT 12/13 Similar minimal increased density along the right aspect of the posterior   falx and right tentorial leaf, consistent with minimal residual subdural   hemorrhage.  No parenchymal hemorrhage or brain edema.  Acute pain control with Tylenol and prns  PT ongoing.    ***Cardiovascular***  At high risk for hemodynamic instability and cardiac arrhythmias.  Off inotropes and pressors currently.  TTE 12/19 showed EF 24% with normal RV function  Started on beta blocker Lopressor 12.5 BID   SQ Heparin for VTE prophylaxis   PO Amiodarone  ASA /Statin daily    ***Pulmonary***  Respiratory failure s/p Tracheostomy - continue TC trials  Wean ventilator as tolerated.   Deep breathing and coughing exercises.  Wean oxygen as able.    ***GI***  Protein calorie malnutrition - Tube feeds via nasal feeding tube  Pancreatitis - Amylase and lipase elevated, will trend.  Protonix for stress ulcer prophylaxis     ***Renal***  ERIC, renal following, tolerating HD  Replete electrolytes as indicated.    ***ID***  Leukocytosis - off antibiotics currently, will trend.  Appreciate ID input    ***Endocrine***  Hx of DM2, continue glycemic control w/ ISS.    ***Hematology***  Acute blood loss anemia and thrombocytopenia - no transfusion indication currently        Patient requires continuous monitoring with bedside rhythm monitoring, pulse oximetry monitoring, and continuous central venous and arterial pressure monitoring; and intermittent blood gas analysis. Care plan discussed with the ICU care team.   Patient remained critical, at risk for life threatening decompensation.    I have spent 35 minutes providing critical care management to this patient.    By signing my name below, I, Maria D'Amico, attest that this documentation has been prepared under the direction and in the presence of Jade Rosas MD  Electronically signed: Maria D'Amico, Scribe, 12-21-22 @ 07:33    I, Jade Rosas MD, personally performed the services described in this documentation. All medical record entries made by the karlaibkyle were at my direction and in my presence. I have reviewed the chart and agree that the record reflects my personal performance and is accurate and complete  Electronically signed: Jade Rosas MD

## 2022-12-21 NOTE — PROGRESS NOTE ADULT - ASSESSMENT
61 YO M with a history of CAD s/p 4v CABG ~2019 in [facility], DM2 (A1c 7.3%), and HTN who initially presented to OSH with abdominal pain and n/v and found to have pancreatitis with lipase > 3000 though also with elevated troponins. CT abdomen revealed acute pancreatitis and his initial LVEF was 40-45%. He developed MSOF with hypoxic respiratory failure requiring intubation and ERIC and went into hypoxic PEA arrest requiring ACLS and due to persistent hypoxia and hypotension on pressors after ROSC was cannulated to [facility] (LFV, RFA, no anterograde perfusion catheter) on 11/25. Notably CTH that day revealed small subdural hemorrhage.     His post arrest TTE on 11/26 showed a signficantly worse LVEF of 5-10% with an AV that was only minimally opening. Since then he has had improvement in his LV function (now ~35-40%) and improved pulsatility while tolerating progressive slow ECMO weans. ECMO turndown (note in chart 11/30) was reassuring for ability to decannulate to IABP/inotropes. LHC 12/06 showed closure of his 3 vein grafts with graft to LAD patent though with distal native disease. IABP placed, ECMO successfully decannulated 12/08 (transfused 2u pRBCs during). His ARDS is improving. Trach placed. now off IABP and inotropes. Started on GDMT. Still on HD.     Previous cardiac studies:  TTE 12/19/22 - EF 24%, LVIDd 5.6, normal RV function, estimated pasp 45mmhg  LHC (12/06/22) - patent LIMA-LAD, RCA , LCx , aortogram w/ closure of 3 vein grafts, LVEDP 22 mm Hg, left-to-left and left-to-right collaterals  TTE (12/03/22) - mod-to-sev segmental LVSD (inferolateral, inferior, inferoseptal hypokinesis)       61 YO M with a history of CAD s/p 4v CABG ~2019 in [facility], DM2 (A1c 7.3%), and HTN who initially presented to OSH with abdominal pain and n/v and found to have pancreatitis with lipase > 3000 though also with elevated troponins. CT abdomen revealed acute pancreatitis and his initial LVEF was 40-45%. He developed MSOF with hypoxic respiratory failure requiring intubation and ERIC and went into hypoxic PEA arrest requiring ACLS and due to persistent hypoxia and hypotension on pressors after ROSC was cannulated to [facility] ECMO (LFV, RFA, no anterograde perfusion catheter) on 11/25. Notably CTH that day revealed small subdural hemorrhage.     His post arrest TTE on 11/26 showed a signficantly worse LVEF of 5-10% with an AV that was only minimally opening. Since then he has had improvement in his LV function (now ~35-40%) and improved pulsatility while tolerating progressive slow ECMO weans. ECMO turndown (note in chart 11/30) was reassuring for ability to decannulate to IABP/inotropes. LHC 12/06 showed closure of his 3 vein grafts with graft to LAD patent though with distal native disease. IABP placed, ECMO successfully decannulated 12/08 (transfused 2u pRBCs during). His ARDS is improving. Trach placed. now off IABP and inotropes. Started on GDMT. Still on HD.     Previous cardiac studies:  TTE 12/19/22 - EF 24%, LVIDd 5.6, normal RV function, estimated pasp 45mmhg  LHC (12/06/22) - patent LIMA-LAD, RCA , LCx , aortogram w/ closure of 3 vein grafts, LVEDP 22 mm Hg, left-to-left and left-to-right collaterals  TTE (12/03/22) - mod-to-sev segmental LVSD (inferolateral, inferior, inferoseptal hypokinesis)       63 YO M with a history of CAD s/p 4v CABG ~2019 in [facility], DM2 (A1c 7.3%), and HTN who initially presented to OSH with abdominal pain and n/v and found to have pancreatitis with lipase > 3000 though also with elevated troponins. CT abdomen revealed acute pancreatitis and his initial LVEF was 40-45%. He developed MSOF with hypoxic respiratory failure requiring intubation and ERIC and went into hypoxic PEA arrest requiring ACLS and due to persistent hypoxia and hypotension on pressors after ROSC was cannulated to [facility] (LFV, RFA, no anterograde perfusion catheter) on 11/25. Notably CTH that day revealed small subdural hemorrhage.     His post arrest TTE on 11/26 showed a signficantly worse LVEF of 5-10% with an AV that was only minimally opening. Since then he has had improvement in his LV function (now ~35-40%) and improved pulsatility while tolerating progressive slow ECMO weans. ECMO turndown (note in chart 11/30) was reassuring for ability to decannulate to IABP/inotropes. LHC 12/06 showed closure of his 3 vein grafts with graft to LAD patent though with distal native disease. IABP placed, ECMO successfully decannulated 12/08 (transfused 2u pRBCs during). His ARDS is improving. Trach placed. now off IABP and inotropes. Started on GDMT. Still on HD.     Previous cardiac studies:  TTE 12/19/22 - EF 24%, LVIDd 5.6, normal RV function, estimated pasp 45mmhg  LHC (12/06/22) - patent LIMA-LAD, RCA , LCx , aortogram w/ closure of 3 vein grafts, LVEDP 22 mm Hg, left-to-left and left-to-right collaterals  TTE (12/03/22) - mod-to-sev segmental LVSD (inferolateral, inferior, inferoseptal hypokinesis)

## 2022-12-21 NOTE — PROGRESS NOTE ADULT - SUBJECTIVE AND OBJECTIVE BOX
ENT ISSUE/POD: s/p #7 Proximal XLT tracheostomy POD#5    HPI: 61 YO M with PMH of CABG, DM, HTN, HLD presenting as transfer from [place] for STEMI. Course c/b gallstone pancreatitis leading to ARDS, shock, cardiac arrest then placed on [facility] ECMO. Pt underwent ECMO decannulation. ENT called to evaluate for tracheostomy.  Now S/P #7 Proximal XLT Tracheostomy POD # 5.  Doing well on the ventilator. No bleeding noted.         PAST MEDICAL & SURGICAL HISTORY:    Allergies    No Known Allergies    Intolerances      MEDICATIONS  (STANDING):  acetylcysteine 20%  Inhalation 4 milliLiter(s) Inhalation every 6 hours  aMIOdarone    Tablet 200 milliGRAM(s) Oral daily  aspirin  chewable 81 milliGRAM(s) Oral daily  atorvastatin 40 milliGRAM(s) Oral at bedtime  chlorhexidine 0.12% Liquid 15 milliLiter(s) Oral Mucosa every 12 hours  chlorhexidine 2% Cloths 1 Application(s) Topical <User Schedule>  chlorhexidine 4% Liquid 1 Application(s) Topical <User Schedule>  heparin   Injectable 5000 Unit(s) SubCutaneous every 8 hours  HYDROmorphone  Injectable 0.5 milliGRAM(s) IV Push once  insulin lispro (ADMELOG) corrective regimen sliding scale   SubCutaneous every 6 hours  ipratropium    for Nebulization 500 MICROGram(s) Nebulizer every 6 hours  metoprolol tartrate 12.5 milliGRAM(s) Oral every 12 hours  multivitamin/minerals/iron Oral Solution (CENTRUM) 15 milliLiter(s) Oral daily  pantoprazole  Injectable 40 milliGRAM(s) IV Push daily  thiamine 100 milliGRAM(s) Enteral Tube daily    MEDICATIONS  (PRN):  acetaminophen    Suspension .. 650 milliGRAM(s) Oral every 6 hours PRN Temp greater or equal to 38.5C (101.3F)  sodium chloride 0.9% lock flush 10 milliLiter(s) IV Push every 1 hour PRN Pre/post blood products, medications, blood draw, and to maintain line patency      Social History: see consult    Family history: see consult    ROS:   ENT: all negative except as noted in HPI   Pulm: denies SOB, cough, hemoptysis  Neuro: denies numbness/tingling, loss of sensation  Endo: denies heat/cold intolerance, excessive sweating      Vital Signs Last 24 Hrs  T(C): 36.9 (21 Dec 2022 04:00), Max: 36.9 (21 Dec 2022 04:00)  T(F): 98.4 (21 Dec 2022 04:00), Max: 98.4 (21 Dec 2022 04:00)  HR: 85 (21 Dec 2022 08:30) (57 - 111)  BP: --  BP(mean): --  RR: 20 (21 Dec 2022 08:30) (16 - 43)  SpO2: 100% (21 Dec 2022 08:30) (94% - 100%)    Parameters below as of 21 Dec 2022 08:30  Patient On (Oxygen Delivery Method): tracheostomy collar    O2 Concentration (%): 40                          9.1    14.66 )-----------( 69       ( 21 Dec 2022 00:39 )             29.1    12-21    134<L>  |  98  |  33<H>  ----------------------------<  160<H>  4.5   |  25  |  2.37<H>    Ca    7.8<L>      21 Dec 2022 00:38  Phos  3.6     12-21  Mg     2.5     12-21    TPro  6.6  /  Alb  2.9<L>  /  TBili  0.8  /  DBili  x   /  AST  57<H>  /  ALT  34  /  AlkPhos  105  12-21       PHYSICAL EXAM:  Gen: NAD  Skin: No rashes, bruises, or lesions  Head: Normocephalic, Atraumatic  Face: no edema, erythema, or fluctuance. Parotid glands soft without mass  Eyes: no scleral injection  Nose: Nares bilaterally patent, no discharge  Mouth: No Stridor / Drooling / Trismus.  Mucosa moist, tongue/uvula midline, oropharynx clear  Neck:  #7 Proximal XLT Trach secured with sutures x 4 and umbilical tie. Dry, crusted blood noted around stoma. No hematoma or crepitus.  Flat, supple, no lymphadenopathy, trachea midline, no masses  Lymphatic: No lymphadenopathy  Resp: on vent  Neuro: facial nerve intact, no facial droop         ENT ISSUE/POD: s/p #7 Proximal XLT tracheostomy POD#5    HPI: 61 YO M with PMH of CABG, DM, HTN, HLD presenting as transfer from [place] for STEMI. Course c/b gallstone pancreatitis leading to ARDS, shock, cardiac arrest then placed on [facility] ECMO. Pt underwent ECMO decannulation. ENT called to evaluate for tracheostomy.  Now S/P #7 Proximal XLT Tracheostomy POD # 5.  Doing well on the ventilator. No bleeding noted.         PAST MEDICAL & SURGICAL HISTORY:    Allergies    No Known Allergies    Intolerances      MEDICATIONS  (STANDING):  acetylcysteine 20%  Inhalation 4 milliLiter(s) Inhalation every 6 hours  aMIOdarone    Tablet 200 milliGRAM(s) Oral daily  aspirin  chewable 81 milliGRAM(s) Oral daily  atorvastatin 40 milliGRAM(s) Oral at bedtime  chlorhexidine 0.12% Liquid 15 milliLiter(s) Oral Mucosa every 12 hours  chlorhexidine 2% Cloths 1 Application(s) Topical <User Schedule>  chlorhexidine 4% Liquid 1 Application(s) Topical <User Schedule>  heparin   Injectable 5000 Unit(s) SubCutaneous every 8 hours  HYDROmorphone  Injectable 0.5 milliGRAM(s) IV Push once  insulin lispro (ADMELOG) corrective regimen sliding scale   SubCutaneous every 6 hours  ipratropium    for Nebulization 500 MICROGram(s) Nebulizer every 6 hours  metoprolol tartrate 12.5 milliGRAM(s) Oral every 12 hours  multivitamin/minerals/iron Oral Solution (CENTRUM) 15 milliLiter(s) Oral daily  pantoprazole  Injectable 40 milliGRAM(s) IV Push daily  thiamine 100 milliGRAM(s) Enteral Tube daily    MEDICATIONS  (PRN):  acetaminophen    Suspension .. 650 milliGRAM(s) Oral every 6 hours PRN Temp greater or equal to 38.5C (101.3F)  sodium chloride 0.9% lock flush 10 milliLiter(s) IV Push every 1 hour PRN Pre/post blood products, medications, blood draw, and to maintain line patency      Social History: see consult    Family history: see consult    ROS:   ENT: all negative except as noted in HPI   Pulm: denies SOB, cough, hemoptysis  Neuro: denies numbness/tingling, loss of sensation  Endo: denies heat/cold intolerance, excessive sweating      Vital Signs Last 24 Hrs  T(C): 36.9 (21 Dec 2022 04:00), Max: 36.9 (21 Dec 2022 04:00)  T(F): 98.4 (21 Dec 2022 04:00), Max: 98.4 (21 Dec 2022 04:00)  HR: 85 (21 Dec 2022 08:30) (57 - 111)  BP: --  BP(mean): --  RR: 20 (21 Dec 2022 08:30) (16 - 43)  SpO2: 100% (21 Dec 2022 08:30) (94% - 100%)    Parameters below as of 21 Dec 2022 08:30  Patient On (Oxygen Delivery Method): tracheostomy collar    O2 Concentration (%): 40                          9.1    14.66 )-----------( 69       ( 21 Dec 2022 00:39 )             29.1    12-21    134<L>  |  98  |  33<H>  ----------------------------<  160<H>  4.5   |  25  |  2.37<H>    Ca    7.8<L>      21 Dec 2022 00:38  Phos  3.6     12-21  Mg     2.5     12-21    TPro  6.6  /  Alb  2.9<L>  /  TBili  0.8  /  DBili  x   /  AST  57<H>  /  ALT  34  /  AlkPhos  105  12-21       PHYSICAL EXAM:  Gen: NAD  Skin: No rashes, bruises, or lesions  Head: Normocephalic, Atraumatic  Face: no edema, erythema, or fluctuance. Parotid glands soft without mass  Eyes: no scleral injection  Nose: Nares bilaterally patent, no discharge  Mouth: No Stridor / Drooling / Trismus.  Mucosa moist, tongue/uvula midline, oropharynx clear  Neck:  #7 Proximal XLT Trach secured with sutures x 4 and umbilical tie. Dry, crusted blood noted around stoma. No hematoma or crepitus.  Flat, supple, no lymphadenopathy, trachea midline, no masses  Lymphatic: No lymphadenopathy  Resp: on vent  Neuro: facial nerve intact, no facial droop         ENT ISSUE/POD: s/p #7 Proximal XLT tracheostomy POD#5    HPI: 61 YO M with PMH of CABG, DM, HTN, HLD presenting as transfer from [place] for STEMI. Course c/b gallstone pancreatitis leading to ARDS, shock, cardiac arrest then placed on [facility] ECMO. Pt underwent ECMO decannulation. ENT called to evaluate for tracheostomy.  Now S/P #7 Proximal XLT Tracheostomy POD # 5.  Doing well on the ventilator. No bleeding noted.         PAST MEDICAL & SURGICAL HISTORY:    Allergies    No Known Allergies    Intolerances      MEDICATIONS  (STANDING):  acetylcysteine 20%  Inhalation 4 milliLiter(s) Inhalation every 6 hours  aMIOdarone    Tablet 200 milliGRAM(s) Oral daily  aspirin  chewable 81 milliGRAM(s) Oral daily  atorvastatin 40 milliGRAM(s) Oral at bedtime  chlorhexidine 0.12% Liquid 15 milliLiter(s) Oral Mucosa every 12 hours  chlorhexidine 2% Cloths 1 Application(s) Topical <User Schedule>  chlorhexidine 4% Liquid 1 Application(s) Topical <User Schedule>  heparin   Injectable 5000 Unit(s) SubCutaneous every 8 hours  HYDROmorphone  Injectable 0.5 milliGRAM(s) IV Push once  insulin lispro (ADMELOG) corrective regimen sliding scale   SubCutaneous every 6 hours  ipratropium    for Nebulization 500 MICROGram(s) Nebulizer every 6 hours  metoprolol tartrate 12.5 milliGRAM(s) Oral every 12 hours  multivitamin/minerals/iron Oral Solution (CENTRUM) 15 milliLiter(s) Oral daily  pantoprazole  Injectable 40 milliGRAM(s) IV Push daily  thiamine 100 milliGRAM(s) Enteral Tube daily    MEDICATIONS  (PRN):  acetaminophen    Suspension .. 650 milliGRAM(s) Oral every 6 hours PRN Temp greater or equal to 38.5C (101.3F)  sodium chloride 0.9% lock flush 10 milliLiter(s) IV Push every 1 hour PRN Pre/post blood products, medications, blood draw, and to maintain line patency      Social History: see consult    Family history: see consult    ROS:   ENT: all negative except as noted in HPI   Pulm: denies SOB, cough, hemoptysis  Neuro: denies numbness/tingling, loss of sensation  Endo: denies heat/cold intolerance, excessive sweating      Vital Signs Last 24 Hrs  T(C): 36.9 (21 Dec 2022 04:00), Max: 36.9 (21 Dec 2022 04:00)  T(F): 98.4 (21 Dec 2022 04:00), Max: 98.4 (21 Dec 2022 04:00)  HR: 85 (21 Dec 2022 08:30) (57 - 111)  BP: --  BP(mean): --  RR: 20 (21 Dec 2022 08:30) (16 - 43)  SpO2: 100% (21 Dec 2022 08:30) (94% - 100%)    Parameters below as of 21 Dec 2022 08:30  Patient On (Oxygen Delivery Method): tracheostomy collar    O2 Concentration (%): 40                          9.1    14.66 )-----------( 69       ( 21 Dec 2022 00:39 )             29.1    12-21    134<L>  |  98  |  33<H>  ----------------------------<  160<H>  4.5   |  25  |  2.37<H>    Ca    7.8<L>      21 Dec 2022 00:38  Phos  3.6     12-21  Mg     2.5     12-21    TPro  6.6  /  Alb  2.9<L>  /  TBili  0.8  /  DBili  x   /  AST  57<H>  /  ALT  34  /  AlkPhos  105  12-21       PHYSICAL EXAM:  Gen: NAD  Skin: No rashes, bruises, or lesions  Head: Normocephalic, Atraumatic  Face: no edema, erythema, or fluctuance. Parotid glands soft without mass  Eyes: no scleral injection  Nose: Nares bilaterally patent, no discharge  Mouth: No Stridor / Drooling / Trismus.  Mucosa moist, tongue/uvula midline, oropharynx clear  Neck:  #7 Proximal XLT Trach secured with sutures x 4 and umbilical tie. Dry, crusted blood noted around stoma. No hematoma or crepitus.  Flat, supple, no lymphadenopathy, trachea midline, no masses  Lymphatic: No lymphadenopathy  Resp: on vent  Neuro: facial nerve intact, no facial droop         ENT ISSUE/POD: s/p #7 Proximal XLT tracheostomy POD#5    HPI: 63 YO M with PMH of CABG, DM, HTN, HLD presenting as transfer from [place] for STEMI. Course c/b gallstone pancreatitis leading to ARDS, shock, cardiac arrest then placed on [facility] ECMO. Pt underwent ECMO decannulation. ENT called to evaluate for tracheostomy.  Now S/P #7 Proximal XLT Tracheostomy POD # 5.  Doing well on the ventilator. No bleeding noted.         PAST MEDICAL & SURGICAL HISTORY:    Allergies    No Known Allergies    Intolerances      MEDICATIONS  (STANDING):  acetylcysteine 20%  Inhalation 4 milliLiter(s) Inhalation every 6 hours  aMIOdarone    Tablet 200 milliGRAM(s) Oral daily  aspirin  chewable 81 milliGRAM(s) Oral daily  atorvastatin 40 milliGRAM(s) Oral at bedtime  chlorhexidine 0.12% Liquid 15 milliLiter(s) Oral Mucosa every 12 hours  chlorhexidine 2% Cloths 1 Application(s) Topical <User Schedule>  chlorhexidine 4% Liquid 1 Application(s) Topical <User Schedule>  heparin   Injectable 5000 Unit(s) SubCutaneous every 8 hours  HYDROmorphone  Injectable 0.5 milliGRAM(s) IV Push once  insulin lispro (ADMELOG) corrective regimen sliding scale   SubCutaneous every 6 hours  ipratropium    for Nebulization 500 MICROGram(s) Nebulizer every 6 hours  metoprolol tartrate 12.5 milliGRAM(s) Oral every 12 hours  multivitamin/minerals/iron Oral Solution (CENTRUM) 15 milliLiter(s) Oral daily  pantoprazole  Injectable 40 milliGRAM(s) IV Push daily  thiamine 100 milliGRAM(s) Enteral Tube daily    MEDICATIONS  (PRN):  acetaminophen    Suspension .. 650 milliGRAM(s) Oral every 6 hours PRN Temp greater or equal to 38.5C (101.3F)  sodium chloride 0.9% lock flush 10 milliLiter(s) IV Push every 1 hour PRN Pre/post blood products, medications, blood draw, and to maintain line patency      Social History: see consult    Family history: see consult    ROS:   ENT: all negative except as noted in HPI   Pulm: denies SOB, cough, hemoptysis  Neuro: denies numbness/tingling, loss of sensation  Endo: denies heat/cold intolerance, excessive sweating      Vital Signs Last 24 Hrs  T(C): 36.9 (21 Dec 2022 04:00), Max: 36.9 (21 Dec 2022 04:00)  T(F): 98.4 (21 Dec 2022 04:00), Max: 98.4 (21 Dec 2022 04:00)  HR: 85 (21 Dec 2022 08:30) (57 - 111)  BP: --  BP(mean): --  RR: 20 (21 Dec 2022 08:30) (16 - 43)  SpO2: 100% (21 Dec 2022 08:30) (94% - 100%)    Parameters below as of 21 Dec 2022 08:30  Patient On (Oxygen Delivery Method): tracheostomy collar    O2 Concentration (%): 40                          9.1    14.66 )-----------( 69       ( 21 Dec 2022 00:39 )             29.1    12-21    134<L>  |  98  |  33<H>  ----------------------------<  160<H>  4.5   |  25  |  2.37<H>    Ca    7.8<L>      21 Dec 2022 00:38  Phos  3.6     12-21  Mg     2.5     12-21    TPro  6.6  /  Alb  2.9<L>  /  TBili  0.8  /  DBili  x   /  AST  57<H>  /  ALT  34  /  AlkPhos  105  12-21       PHYSICAL EXAM:  Gen: NAD  Skin: No rashes, bruises, or lesions  Head: Normocephalic, Atraumatic  Face: no edema, erythema, or fluctuance. Parotid glands soft without mass  Eyes: no scleral injection  Nose: Nares bilaterally patent, no discharge  Mouth: No Stridor / Drooling / Trismus.  Mucosa moist, tongue/uvula midline, oropharynx clear  Neck:  #7 Proximal XLT Trach secured with sutures x 4 and umbilical tie. Dry, crusted blood noted around superior stoma. No surgicel noted at stoma. No hematoma or crepitus.  Flat, supple, no lymphadenopathy, trachea midline, no masses  Lymphatic: No lymphadenopathy  Resp: on vent  Neuro: facial nerve intact, no facial droop         ENT ISSUE/POD: s/p #7 Proximal XLT tracheostomy POD#5    HPI: 63 YO M with PMH of CABG, DM, HTN, HLD presenting as transfer from [place] for STEMI. Course c/b gallstone pancreatitis leading to ARDS, shock, cardiac arrest then placed on [facility] ECMO. Pt underwent ECMO decannulation. ENT called to evaluate for tracheostomy.  Now S/P #7 Proximal XLT Tracheostomy POD # 5.  Doing well on the ventilator. No bleeding noted.         PAST MEDICAL & SURGICAL HISTORY:    Allergies    No Known Allergies    Intolerances      MEDICATIONS  (STANDING):  acetylcysteine 20%  Inhalation 4 milliLiter(s) Inhalation every 6 hours  aMIOdarone    Tablet 200 milliGRAM(s) Oral daily  aspirin  chewable 81 milliGRAM(s) Oral daily  atorvastatin 40 milliGRAM(s) Oral at bedtime  chlorhexidine 0.12% Liquid 15 milliLiter(s) Oral Mucosa every 12 hours  chlorhexidine 2% Cloths 1 Application(s) Topical <User Schedule>  chlorhexidine 4% Liquid 1 Application(s) Topical <User Schedule>  heparin   Injectable 5000 Unit(s) SubCutaneous every 8 hours  HYDROmorphone  Injectable 0.5 milliGRAM(s) IV Push once  insulin lispro (ADMELOG) corrective regimen sliding scale   SubCutaneous every 6 hours  ipratropium    for Nebulization 500 MICROGram(s) Nebulizer every 6 hours  metoprolol tartrate 12.5 milliGRAM(s) Oral every 12 hours  multivitamin/minerals/iron Oral Solution (CENTRUM) 15 milliLiter(s) Oral daily  pantoprazole  Injectable 40 milliGRAM(s) IV Push daily  thiamine 100 milliGRAM(s) Enteral Tube daily    MEDICATIONS  (PRN):  acetaminophen    Suspension .. 650 milliGRAM(s) Oral every 6 hours PRN Temp greater or equal to 38.5C (101.3F)  sodium chloride 0.9% lock flush 10 milliLiter(s) IV Push every 1 hour PRN Pre/post blood products, medications, blood draw, and to maintain line patency      Social History: see consult    Family history: see consult    ROS:   ENT: all negative except as noted in HPI   Pulm: denies SOB, cough, hemoptysis  Neuro: denies numbness/tingling, loss of sensation  Endo: denies heat/cold intolerance, excessive sweating      Vital Signs Last 24 Hrs  T(C): 36.9 (21 Dec 2022 04:00), Max: 36.9 (21 Dec 2022 04:00)  T(F): 98.4 (21 Dec 2022 04:00), Max: 98.4 (21 Dec 2022 04:00)  HR: 85 (21 Dec 2022 08:30) (57 - 111)  BP: --  BP(mean): --  RR: 20 (21 Dec 2022 08:30) (16 - 43)  SpO2: 100% (21 Dec 2022 08:30) (94% - 100%)    Parameters below as of 21 Dec 2022 08:30  Patient On (Oxygen Delivery Method): tracheostomy collar    O2 Concentration (%): 40                          9.1    14.66 )-----------( 69       ( 21 Dec 2022 00:39 )             29.1    12-21    134<L>  |  98  |  33<H>  ----------------------------<  160<H>  4.5   |  25  |  2.37<H>    Ca    7.8<L>      21 Dec 2022 00:38  Phos  3.6     12-21  Mg     2.5     12-21    TPro  6.6  /  Alb  2.9<L>  /  TBili  0.8  /  DBili  x   /  AST  57<H>  /  ALT  34  /  AlkPhos  105  12-21       PHYSICAL EXAM:  Gen: NAD  Skin: No rashes, bruises, or lesions  Head: Normocephalic, Atraumatic  Face: no edema, erythema, or fluctuance. Parotid glands soft without mass  Eyes: no scleral injection  Nose: Nares bilaterally patent, no discharge  Mouth: No Stridor / Drooling / Trismus.  Mucosa moist, tongue/uvula midline, oropharynx clear  Neck:  #7 Proximal XLT Trach secured with sutures x 4 and umbilical tie. Dry, crusted blood noted around superior stoma. No surgicel noted at stoma. No hematoma or crepitus.  Flat, supple, no lymphadenopathy, trachea midline, no masses  Lymphatic: No lymphadenopathy  Resp: on vent  Neuro: facial nerve intact, no facial droop         ENT ISSUE/POD: s/p #7 Proximal XLT tracheostomy POD#5    HPI: 61 YO M with PMH of CABG, DM, HTN, HLD presenting as transfer from [place] for STEMI. Course c/b gallstone pancreatitis leading to ARDS, shock, cardiac arrest then placed on [facility] ECMO. Pt underwent ECMO decannulation. ENT called to evaluate for tracheostomy.  Now S/P #7 Proximal XLT Tracheostomy POD # 5.  Doing well on the ventilator. No bleeding noted.         PAST MEDICAL & SURGICAL HISTORY:    Allergies    No Known Allergies    Intolerances      MEDICATIONS  (STANDING):  acetylcysteine 20%  Inhalation 4 milliLiter(s) Inhalation every 6 hours  aMIOdarone    Tablet 200 milliGRAM(s) Oral daily  aspirin  chewable 81 milliGRAM(s) Oral daily  atorvastatin 40 milliGRAM(s) Oral at bedtime  chlorhexidine 0.12% Liquid 15 milliLiter(s) Oral Mucosa every 12 hours  chlorhexidine 2% Cloths 1 Application(s) Topical <User Schedule>  chlorhexidine 4% Liquid 1 Application(s) Topical <User Schedule>  heparin   Injectable 5000 Unit(s) SubCutaneous every 8 hours  HYDROmorphone  Injectable 0.5 milliGRAM(s) IV Push once  insulin lispro (ADMELOG) corrective regimen sliding scale   SubCutaneous every 6 hours  ipratropium    for Nebulization 500 MICROGram(s) Nebulizer every 6 hours  metoprolol tartrate 12.5 milliGRAM(s) Oral every 12 hours  multivitamin/minerals/iron Oral Solution (CENTRUM) 15 milliLiter(s) Oral daily  pantoprazole  Injectable 40 milliGRAM(s) IV Push daily  thiamine 100 milliGRAM(s) Enteral Tube daily    MEDICATIONS  (PRN):  acetaminophen    Suspension .. 650 milliGRAM(s) Oral every 6 hours PRN Temp greater or equal to 38.5C (101.3F)  sodium chloride 0.9% lock flush 10 milliLiter(s) IV Push every 1 hour PRN Pre/post blood products, medications, blood draw, and to maintain line patency      Social History: see consult    Family history: see consult    ROS:   ENT: all negative except as noted in HPI   Pulm: denies SOB, cough, hemoptysis  Neuro: denies numbness/tingling, loss of sensation  Endo: denies heat/cold intolerance, excessive sweating      Vital Signs Last 24 Hrs  T(C): 36.9 (21 Dec 2022 04:00), Max: 36.9 (21 Dec 2022 04:00)  T(F): 98.4 (21 Dec 2022 04:00), Max: 98.4 (21 Dec 2022 04:00)  HR: 85 (21 Dec 2022 08:30) (57 - 111)  BP: --  BP(mean): --  RR: 20 (21 Dec 2022 08:30) (16 - 43)  SpO2: 100% (21 Dec 2022 08:30) (94% - 100%)    Parameters below as of 21 Dec 2022 08:30  Patient On (Oxygen Delivery Method): tracheostomy collar    O2 Concentration (%): 40                          9.1    14.66 )-----------( 69       ( 21 Dec 2022 00:39 )             29.1    12-21    134<L>  |  98  |  33<H>  ----------------------------<  160<H>  4.5   |  25  |  2.37<H>    Ca    7.8<L>      21 Dec 2022 00:38  Phos  3.6     12-21  Mg     2.5     12-21    TPro  6.6  /  Alb  2.9<L>  /  TBili  0.8  /  DBili  x   /  AST  57<H>  /  ALT  34  /  AlkPhos  105  12-21       PHYSICAL EXAM:  Gen: NAD  Skin: No rashes, bruises, or lesions  Head: Normocephalic, Atraumatic  Face: no edema, erythema, or fluctuance. Parotid glands soft without mass  Eyes: no scleral injection  Nose: Nares bilaterally patent, no discharge  Mouth: No Stridor / Drooling / Trismus.  Mucosa moist, tongue/uvula midline, oropharynx clear  Neck:  #7 Proximal XLT Trach secured with sutures x 4 and umbilical tie. Dry, crusted blood noted around superior stoma. No surgicel noted at stoma. No hematoma or crepitus.  Flat, supple, no lymphadenopathy, trachea midline, no masses  Lymphatic: No lymphadenopathy  Resp: on vent  Neuro: facial nerve intact, no facial droop

## 2022-12-21 NOTE — PROGRESS NOTE ADULT - ASSESSMENT
61 YO M now S/P #7 Proximal XLT Tracheostomy POD # 5. Doing well on the ventilator. No bleeding noted.   63 YO M now S/P #7 Proximal XLT Tracheostomy POD # 5. Doing well on the ventilator. No bleeding noted.   61 YO M now S/P #7 Proximal XLT Tracheostomy POD # 5. On physical exam, surgicel was not noted, dry crusted blood noted on superior stoma, doing well on the ventilator, no bleeding noted.   63 YO M now S/P #7 Proximal XLT Tracheostomy POD # 5. On physical exam, surgicel was not noted, dry crusted blood noted on superior stoma, doing well on the ventilator, no bleeding noted.

## 2022-12-21 NOTE — PROGRESS NOTE ADULT - SUBJECTIVE AND OBJECTIVE BOX
Patient seen and examined at bedside.    Overnight Events:    Out of bed to chair. Had HD yesterday and will have HD today again.   Comfortable appearing on trach collar.     REVIEW OF SYSTEMS:  Denies pain. Unable to obtain full ROS.         Current Meds:  acetaminophen    Suspension .. 650 milliGRAM(s) Oral every 6 hours PRN  aMIOdarone    Tablet 200 milliGRAM(s) Oral daily  aspirin  chewable 81 milliGRAM(s) Oral daily  atorvastatin 40 milliGRAM(s) Oral at bedtime  chlorhexidine 0.12% Liquid 15 milliLiter(s) Oral Mucosa every 12 hours  chlorhexidine 2% Cloths 1 Application(s) Topical <User Schedule>  chlorhexidine 4% Liquid 1 Application(s) Topical <User Schedule>  heparin   Injectable 5000 Unit(s) SubCutaneous every 8 hours  HYDROmorphone  Injectable 0.5 milliGRAM(s) IV Push once  insulin lispro (ADMELOG) corrective regimen sliding scale   SubCutaneous every 6 hours  metoprolol tartrate 12.5 milliGRAM(s) Oral every 12 hours  multivitamin/minerals/iron Oral Solution (CENTRUM) 15 milliLiter(s) Oral daily  pantoprazole  Injectable 40 milliGRAM(s) IV Push daily  sodium chloride 0.9% lock flush 10 milliLiter(s) IV Push every 1 hour PRN  thiamine 100 milliGRAM(s) Enteral Tube daily      Vitals:  T(F): 97.8 (12-21), Max: 98.4 (12-21)  HR: 97 (12-21) (57 - 111)  BP: --  RR: 22 (12-21)  SpO2: 100% (12-21)  I&O's Summary    20 Dec 2022 07:01  -  21 Dec 2022 07:00  --------------------------------------------------------  IN: 1800 mL / OUT: 2300 mL / NET: -500 mL    21 Dec 2022 07:01  -  21 Dec 2022 13:35  --------------------------------------------------------  IN: 140 mL / OUT: 0 mL / NET: 140 mL      PHYSICAL EXAM:  Appearance: No acute distress in chair  Eyes: no scleral icterus   HEENT: Normal oral mucosa. Trach collar in place   Cardiovascular: RRR, S1, S2, no murmurs, rubs, or gallops; no edema; no JVD  Respiratory: Clear to auscultation bilaterally, no auditory stridor   Gastrointestinal: soft, non-tender, non-distended   Musculoskeletal: No clubbing; no joint deformity   Neurologic: Moving all 4 extremities spontaneously, sensation grossly intact, generalized weakness  Skin: No rashes, or cyanosis                          9.1    14.66 )-----------( 69       ( 21 Dec 2022 00:39 )             29.1     12-21    134<L>  |  98  |  33<H>  ----------------------------<  160<H>  4.5   |  25  |  2.37<H>    Ca    7.8<L>      21 Dec 2022 00:38  Phos  3.6     12-21  Mg     2.5     12-21    TPro  6.6  /  Alb  2.9<L>  /  TBili  0.8  /  DBili  x   /  AST  57<H>  /  ALT  34  /  AlkPhos  105  12-21      CARDIAC MARKERS ( 16 Dec 2022 10:55 )  1085 ng/L / x     / x     / 27 U/L / x     / 1.6 ng/mL

## 2022-12-21 NOTE — CONSULT NOTE ADULT - SUBJECTIVE AND OBJECTIVE BOX
Vascular Surgery Consult  Consulting attending: Dr. West    HPI:  Patient is a 62 year old PMHx of CABG, DM, HTN, HLD presenting as transfer from [place] for c/f STEMI. Now s/p ECMO, IABP, and decannulation. Nephrology reports patient will eventually need HD. Vascular surgery consulted for AVF planning.    Patient does not have any pacemakers or ICDs. No injuries to upper extremities. Able to lie flat with trach collar. Right hand dominant.        PAST MEDICAL HISTORY:      PAST SURGICAL HISTORY:      MEDICATIONS:  acetaminophen    Suspension .. 650 milliGRAM(s) Oral every 6 hours PRN  aMIOdarone    Tablet 200 milliGRAM(s) Oral daily  aspirin  chewable 81 milliGRAM(s) Oral daily  atorvastatin 40 milliGRAM(s) Oral at bedtime  chlorhexidine 0.12% Liquid 15 milliLiter(s) Oral Mucosa every 12 hours  chlorhexidine 2% Cloths 1 Application(s) Topical <User Schedule>  chlorhexidine 4% Liquid 1 Application(s) Topical <User Schedule>  heparin   Injectable 5000 Unit(s) SubCutaneous every 8 hours  HYDROmorphone  Injectable 0.5 milliGRAM(s) IV Push once  insulin lispro (ADMELOG) corrective regimen sliding scale   SubCutaneous every 6 hours  metoprolol tartrate 12.5 milliGRAM(s) Oral every 12 hours  multivitamin/minerals/iron Oral Solution (CENTRUM) 15 milliLiter(s) Oral daily  pantoprazole  Injectable 40 milliGRAM(s) IV Push daily  sodium chloride 0.9% lock flush 10 milliLiter(s) IV Push every 1 hour PRN  thiamine 100 milliGRAM(s) Enteral Tube daily      ALLERGIES:  No Known Allergies      VITALS & I/Os:  Vital Signs Last 24 Hrs  T(C): 36.1 (21 Dec 2022 18:09), Max: 37 (21 Dec 2022 13:55)  T(F): 97 (21 Dec 2022 18:09), Max: 98.6 (21 Dec 2022 13:55)  HR: 100 (21 Dec 2022 18:09) (57 - 111)  BP: 90/53 (21 Dec 2022 18:09) (90/53 - 90/53)  BP(mean): 65 (21 Dec 2022 18:09) (65 - 65)  RR: 22 (21 Dec 2022 18:09) (16 - 36)  SpO2: 98% (21 Dec 2022 18:09) (96% - 100%)    Parameters below as of 21 Dec 2022 18:09  Patient On (Oxygen Delivery Method): tracheostomy collar    O2 Concentration (%): 40  Mode: standby    I&O's Summary    20 Dec 2022 07:01  -  21 Dec 2022 07:00  --------------------------------------------------------  IN: 1800 mL / OUT: 2300 mL / NET: -500 mL    21 Dec 2022 07:01  -  21 Dec 2022 18:53  --------------------------------------------------------  IN: 1625 mL / OUT: 3150 mL / NET: -1525 mL        PHYSICAL EXAM:  Gen: NAD  CV: Regular rate when evaluated  Resp: Trach collar in place  Ext:  - LUE with palpable ulnar pulse, no abnormalities noted on Ludin's test  - RUE with A-line in place      LABS:                        9.1    14.66 )-----------( 69       ( 21 Dec 2022 00:39 )             29.1     12-21    134<L>  |  98  |  33<H>  ----------------------------<  160<H>  4.5   |  25  |  2.37<H>    Ca    7.8<L>      21 Dec 2022 00:38  Phos  3.6     12-21  Mg     2.5     12-21    TPro  6.6  /  Alb  2.9<L>  /  TBili  0.8  /  DBili  x   /  AST  57<H>  /  ALT  34  /  AlkPhos  105  12-21    Lactate:      ABG - ( 21 Dec 2022 16:40 )  pH, Arterial: 7.49  pH, Blood: x     /  pCO2: 37    /  pO2: 78    / HCO3: 28    / Base Excess: 4.6   /  SaO2: 99.0                        IMAGING:                                                                                               Vascular Surgery Consult  Consulting attending: Dr. West    HPI:  Patient is a 62 year old PMHx of CABG, DM, HTN, HLD presenting as transfer from [place] for c/f STEMI. Now s/p ECMO, IABP, and decannulation. Nephrology reports patient will eventually need HD. Vascular surgery consulted for AVF planning.    Patient does not have any pacemakers or ICDs. No injuries to upper extremities. Able to lie flat with trach collar. Right hand dominant.        PAST MEDICAL HISTORY:      PAST SURGICAL HISTORY:      MEDICATIONS:  acetaminophen    Suspension .. 650 milliGRAM(s) Oral every 6 hours PRN  aMIOdarone    Tablet 200 milliGRAM(s) Oral daily  aspirin  chewable 81 milliGRAM(s) Oral daily  atorvastatin 40 milliGRAM(s) Oral at bedtime  chlorhexidine 0.12% Liquid 15 milliLiter(s) Oral Mucosa every 12 hours  chlorhexidine 2% Cloths 1 Application(s) Topical <User Schedule>  chlorhexidine 4% Liquid 1 Application(s) Topical <User Schedule>  heparin   Injectable 5000 Unit(s) SubCutaneous every 8 hours  HYDROmorphone  Injectable 0.5 milliGRAM(s) IV Push once  insulin lispro (ADMELOG) corrective regimen sliding scale   SubCutaneous every 6 hours  metoprolol tartrate 12.5 milliGRAM(s) Oral every 12 hours  multivitamin/minerals/iron Oral Solution (CENTRUM) 15 milliLiter(s) Oral daily  pantoprazole  Injectable 40 milliGRAM(s) IV Push daily  sodium chloride 0.9% lock flush 10 milliLiter(s) IV Push every 1 hour PRN  thiamine 100 milliGRAM(s) Enteral Tube daily      ALLERGIES:  No Known Allergies      VITALS & I/Os:  Vital Signs Last 24 Hrs  T(C): 36.1 (21 Dec 2022 18:09), Max: 37 (21 Dec 2022 13:55)  T(F): 97 (21 Dec 2022 18:09), Max: 98.6 (21 Dec 2022 13:55)  HR: 100 (21 Dec 2022 18:09) (57 - 111)  BP: 90/53 (21 Dec 2022 18:09) (90/53 - 90/53)  BP(mean): 65 (21 Dec 2022 18:09) (65 - 65)  RR: 22 (21 Dec 2022 18:09) (16 - 36)  SpO2: 98% (21 Dec 2022 18:09) (96% - 100%)    Parameters below as of 21 Dec 2022 18:09  Patient On (Oxygen Delivery Method): tracheostomy collar    O2 Concentration (%): 40  Mode: standby    I&O's Summary    20 Dec 2022 07:01  -  21 Dec 2022 07:00  --------------------------------------------------------  IN: 1800 mL / OUT: 2300 mL / NET: -500 mL    21 Dec 2022 07:01  -  21 Dec 2022 18:53  --------------------------------------------------------  IN: 1625 mL / OUT: 3150 mL / NET: -1525 mL        PHYSICAL EXAM:  Gen: NAD  CV: Regular rate when evaluated  Resp: Trach collar in place  Ext:  - LUE with palpable ulnar pulse, no abnormalities noted on Ludin's test  - RUE with A-line in place      LABS:                        9.1    14.66 )-----------( 69       ( 21 Dec 2022 00:39 )             29.1     12-21    134<L>  |  98  |  33<H>  ----------------------------<  160<H>  4.5   |  25  |  2.37<H>    Ca    7.8<L>      21 Dec 2022 00:38  Phos  3.6     12-21  Mg     2.5     12-21    TPro  6.6  /  Alb  2.9<L>  /  TBili  0.8  /  DBili  x   /  AST  57<H>  /  ALT  34  /  AlkPhos  105  12-21    Lactate:      ABG - ( 21 Dec 2022 16:40 )  pH, Arterial: 7.49  pH, Blood: x     /  pCO2: 37    /  pO2: 78    / HCO3: 28    / Base Excess: 4.6   /  SaO2: 99.0                        IMAGING:                                                                                               Vascular Surgery Consult  Consulting attending: Dr. West    HPI:  Patient is a 62 year old PMHx of CABG, DM, HTN, HLD presenting as transfer from [place] for c/f STEMI. Now s/p ECMO, IABP, and decannulation. Nephrology reports patient will eventually need HD. Vascular surgery consulted for AVF planning.    Patient does not have any pacemakers or ICDs. No injuries to upper extremities. Able to lie flat with trach collar. Right hand dominant.        PAST MEDICAL HISTORY:      PAST SURGICAL HISTORY:      MEDICATIONS:  acetaminophen    Suspension .. 650 milliGRAM(s) Oral every 6 hours PRN  aMIOdarone    Tablet 200 milliGRAM(s) Oral daily  aspirin  chewable 81 milliGRAM(s) Oral daily  atorvastatin 40 milliGRAM(s) Oral at bedtime  chlorhexidine 0.12% Liquid 15 milliLiter(s) Oral Mucosa every 12 hours  chlorhexidine 2% Cloths 1 Application(s) Topical <User Schedule>  chlorhexidine 4% Liquid 1 Application(s) Topical <User Schedule>  heparin   Injectable 5000 Unit(s) SubCutaneous every 8 hours  HYDROmorphone  Injectable 0.5 milliGRAM(s) IV Push once  insulin lispro (ADMELOG) corrective regimen sliding scale   SubCutaneous every 6 hours  metoprolol tartrate 12.5 milliGRAM(s) Oral every 12 hours  multivitamin/minerals/iron Oral Solution (CENTRUM) 15 milliLiter(s) Oral daily  pantoprazole  Injectable 40 milliGRAM(s) IV Push daily  sodium chloride 0.9% lock flush 10 milliLiter(s) IV Push every 1 hour PRN  thiamine 100 milliGRAM(s) Enteral Tube daily      ALLERGIES:  No Known Allergies      VITALS & I/Os:  Vital Signs Last 24 Hrs  T(C): 36.1 (21 Dec 2022 18:09), Max: 37 (21 Dec 2022 13:55)  T(F): 97 (21 Dec 2022 18:09), Max: 98.6 (21 Dec 2022 13:55)  HR: 100 (21 Dec 2022 18:09) (57 - 111)  BP: 90/53 (21 Dec 2022 18:09) (90/53 - 90/53)  BP(mean): 65 (21 Dec 2022 18:09) (65 - 65)  RR: 22 (21 Dec 2022 18:09) (16 - 36)  SpO2: 98% (21 Dec 2022 18:09) (96% - 100%)    Parameters below as of 21 Dec 2022 18:09  Patient On (Oxygen Delivery Method): tracheostomy collar    O2 Concentration (%): 40  Mode: standby    I&O's Summary    20 Dec 2022 07:01  -  21 Dec 2022 07:00  --------------------------------------------------------  IN: 1800 mL / OUT: 2300 mL / NET: -500 mL    21 Dec 2022 07:01  -  21 Dec 2022 18:53  --------------------------------------------------------  IN: 1625 mL / OUT: 3150 mL / NET: -1525 mL        PHYSICAL EXAM:  Gen: NAD  CV: Regular rate when evaluated  Resp: Trach collar in place  Ext:  - LUE with palpable ulnar pulse, no abnormalities noted on Ludin's test  - RUE with A-line in place      LABS:                        9.1    14.66 )-----------( 69       ( 21 Dec 2022 00:39 )             29.1     12-21    134<L>  |  98  |  33<H>  ----------------------------<  160<H>  4.5   |  25  |  2.37<H>    Ca    7.8<L>      21 Dec 2022 00:38  Phos  3.6     12-21  Mg     2.5     12-21    TPro  6.6  /  Alb  2.9<L>  /  TBili  0.8  /  DBili  x   /  AST  57<H>  /  ALT  34  /  AlkPhos  105  12-21    Lactate:      ABG - ( 21 Dec 2022 16:40 )  pH, Arterial: 7.49  pH, Blood: x     /  pCO2: 37    /  pO2: 78    / HCO3: 28    / Base Excess: 4.6   /  SaO2: 99.0                        IMAGING:

## 2022-12-21 NOTE — PROGRESS NOTE ADULT - PROBLEM SELECTOR PLAN 1
- ischemic with most recent TTE showing EF 24%  - started metoprolol tartrate 12.5mg BID, tolerating well; would continue current dose  - Will hold off on ACE/ARB/ARNi given ongoing renal pathology   - Can initiate on Hydralazine 10mg TID to improve afterload on non-HD days  - appreciate IV volume removal via HD - ischemic with most recent TTE showing EF 24%  - started metoprolol tartrate 12.5mg BID, tolerating well; would continue current dose. Can switch to Toprol once he's taking PO.   - Will hold off on ACE/ARB/ARNi given ongoing renal pathology.    - Can initiate on Hydralazine 10mg TID to improve afterload on non-HD days  - appreciate IV volume removal via HD  - We will follow him intermittently

## 2022-12-21 NOTE — PROGRESS NOTE ADULT - PROBLEM SELECTOR PLAN 3
- troponin peaked at > 43036   - [facility] 12/06 with IABP placement revealed that 3 grafts are closed w/ distal native disease from remaining LAD graft  - continue statin and aspirin - troponin peaked at > 49355   - [facility] 12/06 with IABP placement revealed that 3 grafts are closed w/ distal native disease from remaining LAD graft  - continue statin and aspirin - troponin peaked at > 39455   - [facility] 12/06 with IABP placement revealed that 3 grafts are closed w/ distal native disease from remaining LAD graft  - continue statin and aspirin

## 2022-12-21 NOTE — PROGRESS NOTE ADULT - PROBLEM SELECTOR PLAN 2
- ECMO decannulated 12/08 after successful wean with IABP and inotropes  - Had severe MR post ECMO removal which prevented appropriate weaning of IABP. Underwent mitral clip on 12/16 with reduction of MR to mild/moderate  - Started Metoprolol tartrate 12.5mg BID, tolerating well - ECMO decannulated 12/08 after successful wean with IABP and inotropes  - Had severe MR post ECMO removal which prevented appropriate weaning of IABP. Underwent mitral clip on 12/16 with reduction of MR to mild/moderate

## 2022-12-21 NOTE — PROGRESS NOTE ADULT - PROBLEM SELECTOR PLAN 5
- likely arrest associated ATN, hypovolemia; nonoliguric   - was on CVVH now on HD with stable Cr  - appreciate nephrology recommendations   - will hold off on addition of ACE/ARB

## 2022-12-21 NOTE — PROGRESS NOTE ADULT - ATTENDING COMMENTS
ERIC/ATN  Required CRRT, now stable for transition to hemodialysis (dialysis today, seen/examined on hemodialysis)  Net negative fluid balance, edema  Dose meds for HD  MAP >65  Remainder per fellow, will follow        Hemodialysis Treatment.:     Schedule: Once, Modality: Hemodialysis, Access: Internal Jugular Central Venous Catheter    Dialyzer: Optiflux [id number], Time: 180 Min    Blood Flow: 400 mL/Min , Dialysate Flow: 500 mL/Min, Dialysate Temp: 36.5, Tubinmm (Adult)    Target Fluid Removal: 2 Liters    Dialysate Electrolytes (mEq/L): Potassium 3, Calcium 2.5, Sodium 138, Bicarbonate 35    Additional Instructions: Please kep MAP above 65 (22 @ 08:25) ERIC/ATN  Required CRRT, now stable for transition to hemodialysis (dialysis today, seen/examined on hemodialysis)  Net negative fluid balance, edema  Dose meds for HD  MAP >65  Remainder per fellow, will follow        Hemodialysis Treatment.:     Schedule: Once, Modality: Hemodialysis, Access: Internal Jugular Central Venous Catheter    Dialyzer: Optiflux [id number], Time: 180 Min    Blood Flow: 400 mL/Min , Dialysate Flow: 500 mL/Min, Dialysate Temp: 36.5, Tubinmm (Adult)    Target Fluid Removal: 2 Liters    Dialysate Electrolytes (mEq/L): Potassium 3, Calcium 2.5, Sodium 138, Bicarbonate 35    Additional Instructions: Please kep MAP above 65 (22 @ 08:25) ERIC/ATN  Required CRRT, now stable for transition to hemodialysis (dialysis today, seen/examined on hemodialysis)  Net negative fluid balance, edema  Dose meds for HD  MAP >65  Remainder per fellow, will follow        Hemodialysis Treatment.:     Schedule: Once, Modality: Hemodialysis, Access: Internal Jugular Central Venous Catheter    Dialyzer: Optiflux [id number], Time: 180 Min    Blood Flow: 400 mL/Min , Dialysate Flow: 500 mL/Min, Dialysate Temp: 36.5, Tubinmm (Adult)    Target Fluid Removal: 2 Liters    Dialysate Electrolytes (mEq/L): Potassium 3, Calcium 2.5, Sodium 138, Bicarbonate 35    Additional Instructions: Please kep MAP above 65 (22 @ 08:25)

## 2022-12-21 NOTE — PROGRESS NOTE ADULT - NS ATTEND AMEND GEN_ALL_CORE FT
ENT follow up s/p trach 12/16/22. No active bleeding or granulation tissue noted around stoma. Trach site clean/dry/intact.    Recommend  -Routine Trach Care.   -Remove Sutures on POD #7.

## 2022-12-21 NOTE — PROGRESS NOTE ADULT - SUBJECTIVE AND OBJECTIVE BOX
[facility] DIVISION OF KIDNEY DISEASES AND HYPERTENSION -- FOLLOW UP NOTE  --------------------------------------------------------------------------------  Chief Complaint: Acute pancreatitis without infection or necrosis    HPI:  61 y/o male with PMH of CABG, DM, HTN, HLD presenting as transfer from [place] for c/f STEMI. Course c/b gallstone pancreatitis leading to ARDS and shock & cardiac arrest now on [facility] ECMO. Had significant troponin elevation and his LVEF down to 8%. Pt was deemed to be too unstable to have an angiogram and potential PCI due to his co-morbidities & a new subdural bleed. Pt being seen for ERIC. SCr on arrival was 2.15; trended down to hector 1.6 on 11/25 and eventually increased to 3.95 11/28. Pt received IV diuretics as per CTU. Pt initiated on CRRT on 12/5/22 to optimize volume status and electrolytes.  Pt underwent decannulation of ECMO on 12/8/22. Pt was restarted on 12/9/22 for volume overload. Pt underwent mitral clip OR (12/16/22).  s/p trach on 12/16     HPI: Pt. seen this AM. Catheter clotted yesterday. New RIJ catheter placed. HD resumed. Pt. back on vent. Breathing ok on vent. Plan for HD today. CVP elevated.    PAST HISTORY  --------------------------------------------------------------------------------  No significant changes to PMH, PSH, FHx, SHx, unless otherwise noted    ALLERGIES & MEDICATIONS  --------------------------------------------------------------------------------  Allergies    No Known Allergies    Intolerances      Standing Inpatient Medications  acetylcysteine 20%  Inhalation 4 milliLiter(s) Inhalation every 6 hours  aMIOdarone    Tablet 200 milliGRAM(s) Oral daily  aspirin  chewable 81 milliGRAM(s) Oral daily  atorvastatin 40 milliGRAM(s) Oral at bedtime  chlorhexidine 0.12% Liquid 15 milliLiter(s) Oral Mucosa every 12 hours  chlorhexidine 2% Cloths 1 Application(s) Topical <User Schedule>  chlorhexidine 4% Liquid 1 Application(s) Topical <User Schedule>  heparin   Injectable 5000 Unit(s) SubCutaneous every 8 hours  HYDROmorphone  Injectable 0.5 milliGRAM(s) IV Push once  insulin lispro (ADMELOG) corrective regimen sliding scale   SubCutaneous every 6 hours  ipratropium    for Nebulization 500 MICROGram(s) Nebulizer every 6 hours  metoprolol tartrate 12.5 milliGRAM(s) Oral every 12 hours  multivitamin/minerals/iron Oral Solution (CENTRUM) 15 milliLiter(s) Oral daily  pantoprazole  Injectable 40 milliGRAM(s) IV Push daily  thiamine 100 milliGRAM(s) Enteral Tube daily    PRN Inpatient Medications  acetaminophen    Suspension .. 650 milliGRAM(s) Oral every 6 hours PRN  sodium chloride 0.9% lock flush 10 milliLiter(s) IV Push every 1 hour PRN      REVIEW OF SYSTEMS  --------------------------------------------------------------------------------  Unable to obtain.    VITALS/PHYSICAL EXAM  --------------------------------------------------------------------------------  T(C): 36.9 (12-21-22 @ 04:00), Max: 36.9 (12-21-22 @ 04:00)  HR: 80 (12-21-22 @ 08:00) (57 - 111)  BP: --  RR: 18 (12-21-22 @ 08:00) (16 - 43)  SpO2: 100% (12-21-22 @ 08:00) (94% - 100%)  Wt(kg): --        12-20-22 @ 07:01  -  12-21-22 @ 07:00  --------------------------------------------------------  IN: 1800 mL / OUT: 2300 mL / NET: -500 mL    12-21-22 @ 07:01  -  12-21-22 @ 08:26  --------------------------------------------------------  IN: 140 mL / OUT: 0 mL / NET: 140 mL        Physical Exam:  	Gen: ill appearing male  	HEENT: Anicteric  	Pulm:  trach+, vented  	CV: S1S2+  	Abd: Soft, +BS       	Ext: trace LE edema B/L  	Neuro: Awake  	Skin: Warm and dry             Acces: CARMELO non tunneled HD catheter    LABS/STUDIES  --------------------------------------------------------------------------------              9.1    14.66 >-----------<  69       [12-21-22 @ 00:39]              29.1     134  |  98  |  33  ----------------------------<  160      [12-21-22 @ 00:38]  4.5   |  25  |  2.37        Ca     7.8     [12-21-22 @ 00:38]      Mg     2.5     [12-21-22 @ 00:38]      Phos  3.6     [12-21-22 @ 00:38]    TPro  6.6  /  Alb  2.9  /  TBili  0.8  /  DBili  x   /  AST  57  /  ALT  34  /  AlkPhos  105  [12-21-22 @ 00:38]      Creatinine Trend:  SCr 2.37 [12-21 @ 00:38]  SCr 2.25 [12-20 @ 00:52]  SCr 1.40 [12-19 @ 00:34]  SCr 1.75 [12-18 @ 02:16]  SCr 1.41 [12-17 @ 00:12]    Urinalysis - [12-16-22 @ 04:28]      Color DK BROWN / Appearance Turbid / SG 1.029 / pH 6.0      Gluc Trace / Ketone Negative  / Bili Negative / Urobili 6 mg/dL       Blood Large / Protein 300 mg/dL / Leuk Est Large / Nitrite Negative       /  / Hyaline 85 / Gran  / Sq Epi  / Non Sq Epi 8 / Bacteria Moderate      TSH 2.84      [12-10-22 @ 15:55]  Lipid: chol --, , HDL --, LDL --      [12-05-22 @ 00:50]    HBsAb 889.7      [12-20-22 @ 11:25]  HBcAb Reactive      [12-20-22 @ 11:25]  HCV 0.15, Nonreact      [12-20-22 @ 11:25]     [facility] DIVISION OF KIDNEY DISEASES AND HYPERTENSION -- FOLLOW UP NOTE  --------------------------------------------------------------------------------  Chief Complaint: Acute pancreatitis without infection or necrosis    HPI:  61 y/o male with PMH of CABG, DM, HTN, HLD presenting as transfer from [place] for c/f STEMI. Course c/b gallstone pancreatitis leading to ARDS and shock & cardiac arrest now on [facility] ECMO. Had significant troponin elevation and his LVEF down to 8%. Pt was deemed to be too unstable to have an angiogram and potential PCI due to his co-morbidities & a new subdural bleed. Pt being seen for ERIC. SCr on arrival was 2.15; trended down to hector 1.6 on 11/25 and eventually increased to 3.95 11/28. Pt received IV diuretics as per CTU. Pt initiated on CRRT on 12/5/22 to optimize volume status and electrolytes.  Pt underwent decannulation of ECMO on 12/8/22. Pt was restarted on 12/9/22 for volume overload. Pt underwent mitral clip OR (12/16/22).  s/p trach on 12/16     HPI: Pt. seen this AM. Catheter clotted yesterday. New RIJ catheter placed. HD resumed. Pt. back on vent. Breathing ok on vent. Plan for HD today. CVP elevated.    PAST HISTORY  --------------------------------------------------------------------------------  No significant changes to PMH, PSH, FHx, SHx, unless otherwise noted    ALLERGIES & MEDICATIONS  --------------------------------------------------------------------------------  Allergies    No Known Allergies    Intolerances      Standing Inpatient Medications  acetylcysteine 20%  Inhalation 4 milliLiter(s) Inhalation every 6 hours  aMIOdarone    Tablet 200 milliGRAM(s) Oral daily  aspirin  chewable 81 milliGRAM(s) Oral daily  atorvastatin 40 milliGRAM(s) Oral at bedtime  chlorhexidine 0.12% Liquid 15 milliLiter(s) Oral Mucosa every 12 hours  chlorhexidine 2% Cloths 1 Application(s) Topical <User Schedule>  chlorhexidine 4% Liquid 1 Application(s) Topical <User Schedule>  heparin   Injectable 5000 Unit(s) SubCutaneous every 8 hours  HYDROmorphone  Injectable 0.5 milliGRAM(s) IV Push once  insulin lispro (ADMELOG) corrective regimen sliding scale   SubCutaneous every 6 hours  ipratropium    for Nebulization 500 MICROGram(s) Nebulizer every 6 hours  metoprolol tartrate 12.5 milliGRAM(s) Oral every 12 hours  multivitamin/minerals/iron Oral Solution (CENTRUM) 15 milliLiter(s) Oral daily  pantoprazole  Injectable 40 milliGRAM(s) IV Push daily  thiamine 100 milliGRAM(s) Enteral Tube daily    PRN Inpatient Medications  acetaminophen    Suspension .. 650 milliGRAM(s) Oral every 6 hours PRN  sodium chloride 0.9% lock flush 10 milliLiter(s) IV Push every 1 hour PRN      REVIEW OF SYSTEMS  --------------------------------------------------------------------------------  Unable to obtain.    VITALS/PHYSICAL EXAM  --------------------------------------------------------------------------------  T(C): 36.9 (12-21-22 @ 04:00), Max: 36.9 (12-21-22 @ 04:00)  HR: 80 (12-21-22 @ 08:00) (57 - 111)  BP: --  RR: 18 (12-21-22 @ 08:00) (16 - 43)  SpO2: 100% (12-21-22 @ 08:00) (94% - 100%)  Wt(kg): --        12-20-22 @ 07:01  -  12-21-22 @ 07:00  --------------------------------------------------------  IN: 1800 mL / OUT: 2300 mL / NET: -500 mL    12-21-22 @ 07:01  -  12-21-22 @ 08:26  --------------------------------------------------------  IN: 140 mL / OUT: 0 mL / NET: 140 mL        Physical Exam:  	Gen: ill appearing male  	HEENT: Anicteric  	Pulm:  trach+, vented  	CV: S1S2+  	Abd: Soft, +BS       	Ext: trace LE edema B/L  	Neuro: Awake  	Skin: Warm and dry             Acces: CARMELO non tunneled HD catheter    LABS/STUDIES  --------------------------------------------------------------------------------              9.1    14.66 >-----------<  69       [12-21-22 @ 00:39]              29.1     134  |  98  |  33  ----------------------------<  160      [12-21-22 @ 00:38]  4.5   |  25  |  2.37        Ca     7.8     [12-21-22 @ 00:38]      Mg     2.5     [12-21-22 @ 00:38]      Phos  3.6     [12-21-22 @ 00:38]    TPro  6.6  /  Alb  2.9  /  TBili  0.8  /  DBili  x   /  AST  57  /  ALT  34  /  AlkPhos  105  [12-21-22 @ 00:38]      Creatinine Trend:  SCr 2.37 [12-21 @ 00:38]  SCr 2.25 [12-20 @ 00:52]  SCr 1.40 [12-19 @ 00:34]  SCr 1.75 [12-18 @ 02:16]  SCr 1.41 [12-17 @ 00:12]    Urinalysis - [12-16-22 @ 04:28]      Color DK BROWN / Appearance Turbid / SG 1.029 / pH 6.0      Gluc Trace / Ketone Negative  / Bili Negative / Urobili 6 mg/dL       Blood Large / Protein 300 mg/dL / Leuk Est Large / Nitrite Negative       /  / Hyaline 85 / Gran  / Sq Epi  / Non Sq Epi 8 / Bacteria Moderate      TSH 2.84      [12-10-22 @ 15:55]  Lipid: chol --, , HDL --, LDL --      [12-05-22 @ 00:50]    HBsAb 889.7      [12-20-22 @ 11:25]  HBcAb Reactive      [12-20-22 @ 11:25]  HCV 0.15, Nonreact      [12-20-22 @ 11:25]     [facility] DIVISION OF KIDNEY DISEASES AND HYPERTENSION -- FOLLOW UP NOTE  --------------------------------------------------------------------------------  Chief Complaint: Acute pancreatitis without infection or necrosis    HPI:  63 y/o male with PMH of CABG, DM, HTN, HLD presenting as transfer from [place] for c/f STEMI. Course c/b gallstone pancreatitis leading to ARDS and shock & cardiac arrest now on [facility] ECMO. Had significant troponin elevation and his LVEF down to 8%. Pt was deemed to be too unstable to have an angiogram and potential PCI due to his co-morbidities & a new subdural bleed. Pt being seen for ERIC. SCr on arrival was 2.15; trended down to hector 1.6 on 11/25 and eventually increased to 3.95 11/28. Pt received IV diuretics as per CTU. Pt initiated on CRRT on 12/5/22 to optimize volume status and electrolytes.  Pt underwent decannulation of ECMO on 12/8/22. Pt was restarted on 12/9/22 for volume overload. Pt underwent mitral clip OR (12/16/22).  s/p trach on 12/16     HPI: Pt. seen this AM. Catheter clotted yesterday. New RIJ catheter placed. HD resumed. Pt. back on vent. Breathing ok on vent. Plan for HD today. CVP elevated.    PAST HISTORY  --------------------------------------------------------------------------------  No significant changes to PMH, PSH, FHx, SHx, unless otherwise noted    ALLERGIES & MEDICATIONS  --------------------------------------------------------------------------------  Allergies    No Known Allergies    Intolerances      Standing Inpatient Medications  acetylcysteine 20%  Inhalation 4 milliLiter(s) Inhalation every 6 hours  aMIOdarone    Tablet 200 milliGRAM(s) Oral daily  aspirin  chewable 81 milliGRAM(s) Oral daily  atorvastatin 40 milliGRAM(s) Oral at bedtime  chlorhexidine 0.12% Liquid 15 milliLiter(s) Oral Mucosa every 12 hours  chlorhexidine 2% Cloths 1 Application(s) Topical <User Schedule>  chlorhexidine 4% Liquid 1 Application(s) Topical <User Schedule>  heparin   Injectable 5000 Unit(s) SubCutaneous every 8 hours  HYDROmorphone  Injectable 0.5 milliGRAM(s) IV Push once  insulin lispro (ADMELOG) corrective regimen sliding scale   SubCutaneous every 6 hours  ipratropium    for Nebulization 500 MICROGram(s) Nebulizer every 6 hours  metoprolol tartrate 12.5 milliGRAM(s) Oral every 12 hours  multivitamin/minerals/iron Oral Solution (CENTRUM) 15 milliLiter(s) Oral daily  pantoprazole  Injectable 40 milliGRAM(s) IV Push daily  thiamine 100 milliGRAM(s) Enteral Tube daily    PRN Inpatient Medications  acetaminophen    Suspension .. 650 milliGRAM(s) Oral every 6 hours PRN  sodium chloride 0.9% lock flush 10 milliLiter(s) IV Push every 1 hour PRN      REVIEW OF SYSTEMS  --------------------------------------------------------------------------------  Unable to obtain.    VITALS/PHYSICAL EXAM  --------------------------------------------------------------------------------  T(C): 36.9 (12-21-22 @ 04:00), Max: 36.9 (12-21-22 @ 04:00)  HR: 80 (12-21-22 @ 08:00) (57 - 111)  BP: --  RR: 18 (12-21-22 @ 08:00) (16 - 43)  SpO2: 100% (12-21-22 @ 08:00) (94% - 100%)  Wt(kg): --        12-20-22 @ 07:01  -  12-21-22 @ 07:00  --------------------------------------------------------  IN: 1800 mL / OUT: 2300 mL / NET: -500 mL    12-21-22 @ 07:01  -  12-21-22 @ 08:26  --------------------------------------------------------  IN: 140 mL / OUT: 0 mL / NET: 140 mL        Physical Exam:  	Gen: ill appearing male  	HEENT: Anicteric  	Pulm:  trach+, vented  	CV: S1S2+  	Abd: Soft, +BS       	Ext: trace LE edema B/L  	Neuro: Awake  	Skin: Warm and dry             Acces: CARMELO non tunneled HD catheter    LABS/STUDIES  --------------------------------------------------------------------------------              9.1    14.66 >-----------<  69       [12-21-22 @ 00:39]              29.1     134  |  98  |  33  ----------------------------<  160      [12-21-22 @ 00:38]  4.5   |  25  |  2.37        Ca     7.8     [12-21-22 @ 00:38]      Mg     2.5     [12-21-22 @ 00:38]      Phos  3.6     [12-21-22 @ 00:38]    TPro  6.6  /  Alb  2.9  /  TBili  0.8  /  DBili  x   /  AST  57  /  ALT  34  /  AlkPhos  105  [12-21-22 @ 00:38]      Creatinine Trend:  SCr 2.37 [12-21 @ 00:38]  SCr 2.25 [12-20 @ 00:52]  SCr 1.40 [12-19 @ 00:34]  SCr 1.75 [12-18 @ 02:16]  SCr 1.41 [12-17 @ 00:12]    Urinalysis - [12-16-22 @ 04:28]      Color DK BROWN / Appearance Turbid / SG 1.029 / pH 6.0      Gluc Trace / Ketone Negative  / Bili Negative / Urobili 6 mg/dL       Blood Large / Protein 300 mg/dL / Leuk Est Large / Nitrite Negative       /  / Hyaline 85 / Gran  / Sq Epi  / Non Sq Epi 8 / Bacteria Moderate      TSH 2.84      [12-10-22 @ 15:55]  Lipid: chol --, , HDL --, LDL --      [12-05-22 @ 00:50]    HBsAb 889.7      [12-20-22 @ 11:25]  HBcAb Reactive      [12-20-22 @ 11:25]  HCV 0.15, Nonreact      [12-20-22 @ 11:25]

## 2022-12-21 NOTE — PROGRESS NOTE ADULT - PROBLEM SELECTOR PLAN 1
Pt with non oliguric ERIC/ ATN in the setting of STEMI, cardiac arrest & cardiogenic shock  SCr on arrival was 2.15; trended down to hector 1.6 on 11/25; slowly trended up & increased to 3.95 11/28.   Pt received IV diuretics. SCr increased to 4.19 with BUN of 101 on 12/5/22. Pt with significant volume overload. Pt initiated on CRRT/CVVHDF to optimize volume and electrolytes on 12/5/22. Pt likely with ATN. Pt underwent decannulation of ECMO on 12/8/22 and was taken off CRRT. Pt reinitiated on CRRT overnight on 12/9/22 for volume overload. s/p trach on 12/16. CRRT stopped again 12/19. Bladder scan showed 250cc. Place catheter if retaining. Plan for HD today. Monitor labs and urine output. Avoid any potential nephrotoxins. Dose medications as per HD.    Please check TSAT, may benefit from IV Iron if low.     If you have any questions, please feel free to contact me  Arsalan Cochran  Nephrology Fellow  [phone]; Prefer [employer] TEAMS  (After 5pm or on weekends please page the on-call fellow) Pt with non oliguric ERIC/ ATN in the setting of STEMI, cardiac arrest & cardiogenic shock  SCr on arrival was 2.15; trended down to hector 1.6 on 11/25; slowly trended up & increased to 3.95 11/28.   Pt received IV diuretics. SCr increased to 4.19 with BUN of 101 on 12/5/22. Pt with significant volume overload. Pt initiated on CRRT/CVVHDF to optimize volume and electrolytes on 12/5/22. Pt likely with ATN. Pt underwent decannulation of ECMO on 12/8/22 and was taken off CRRT. Pt reinitiated on CRRT overnight on 12/9/22 for volume overload. s/p trach on 12/16. CRRT stopped again 12/19. Bladder scan showed 250cc. Place catheter if retaining. Plan for HD today. Monitor labs and urine output. Avoid any potential nephrotoxins. Dose medications as per HD.    Please check TSAT, may benefit from IV Iron if low.     If you have any questions, please feel free to contact me  Arsalan Cochran  Nephrology Fellow  [phone]; Prefer [employer] TEAMS  (After 5pm or on weekends please page the on-call fellow) Pt with non oliguric ERIC/ ATN in the setting of STEMI, cardiac arrest & cardiogenic shock  SCr on arrival was 2.15; trended down to hector 1.6 on 11/25; slowly trended up & increased to 3.95 11/28.   Pt received IV diuretics. SCr increased to 4.19 with BUN of 101 on 12/5/22. Pt with significant volume overload. Pt initiated on CRRT/CVVHDF to optimize volume and electrolytes on 12/5/22. Pt likely with ATN. Pt underwent decannulation of ECMO on 12/8/22 and was taken off CRRT. Pt reinitiated on CRRT overnight on 12/9/22 for volume overload. s/p trach on 12/16. CRRT stopped again 12/19. Bladder scan showed 250cc. Place catheter if retaining. Plan for HD today. Monitor labs and urine output. Avoid any potential nephrotoxins. Dose medications as per HD.    Please check TSAT, may benefit from IV Iron if low.     If you have any questions, please feel free to contact me  Arsalan Cochran  Nephrology Fellow  [phone]; Prefer [employer] TEAMS  (After 5pm or on weekends please page the on-call fellow)

## 2022-12-22 LAB
ALBUMIN SERPL ELPH-MCNC: 2.9 G/DL — LOW (ref 3.3–5)
ALP SERPL-CCNC: 95 U/L — SIGNIFICANT CHANGE UP (ref 40–120)
ALT FLD-CCNC: 34 U/L — SIGNIFICANT CHANGE UP (ref 10–45)
ANION GAP SERPL CALC-SCNC: 10 MMOL/L — SIGNIFICANT CHANGE UP (ref 5–17)
AST SERPL-CCNC: 52 U/L — HIGH (ref 10–40)
BILIRUB SERPL-MCNC: 0.6 MG/DL — SIGNIFICANT CHANGE UP (ref 0.2–1.2)
BLD GP AB SCN SERPL QL: NEGATIVE — SIGNIFICANT CHANGE UP
BUN SERPL-MCNC: 40 MG/DL — HIGH (ref 7–23)
CALCIUM SERPL-MCNC: 7.8 MG/DL — LOW (ref 8.4–10.5)
CHLORIDE SERPL-SCNC: 99 MMOL/L — SIGNIFICANT CHANGE UP (ref 96–108)
CO2 SERPL-SCNC: 26 MMOL/L — SIGNIFICANT CHANGE UP (ref 22–31)
CREAT SERPL-MCNC: 2.74 MG/DL — HIGH (ref 0.5–1.3)
EGFR: 25 ML/MIN/1.73M2 — LOW
GAS PNL BLDA: SIGNIFICANT CHANGE UP
GLUCOSE BLDC GLUCOMTR-MCNC: 168 MG/DL — HIGH (ref 70–99)
GLUCOSE BLDC GLUCOMTR-MCNC: 190 MG/DL — HIGH (ref 70–99)
GLUCOSE BLDC GLUCOMTR-MCNC: 264 MG/DL — HIGH (ref 70–99)
GLUCOSE SERPL-MCNC: 179 MG/DL — HIGH (ref 70–99)
HCT VFR BLD CALC: 28.5 % — LOW (ref 39–50)
HGB BLD-MCNC: 8.9 G/DL — LOW (ref 13–17)
MAGNESIUM SERPL-MCNC: 2.4 MG/DL — SIGNIFICANT CHANGE UP (ref 1.6–2.6)
MCHC RBC-ENTMCNC: 29.7 PG — SIGNIFICANT CHANGE UP (ref 27–34)
MCHC RBC-ENTMCNC: 31.2 GM/DL — LOW (ref 32–36)
MCV RBC AUTO: 95 FL — SIGNIFICANT CHANGE UP (ref 80–100)
NRBC # BLD: 0 /100 WBCS — SIGNIFICANT CHANGE UP (ref 0–0)
PHOSPHATE SERPL-MCNC: 3.7 MG/DL — SIGNIFICANT CHANGE UP (ref 2.5–4.5)
PLATELET # BLD AUTO: 76 K/UL — LOW (ref 150–400)
POTASSIUM SERPL-MCNC: 4.5 MMOL/L — SIGNIFICANT CHANGE UP (ref 3.5–5.3)
POTASSIUM SERPL-SCNC: 4.5 MMOL/L — SIGNIFICANT CHANGE UP (ref 3.5–5.3)
PROT SERPL-MCNC: 6.4 G/DL — SIGNIFICANT CHANGE UP (ref 6–8.3)
RBC # BLD: 3 M/UL — LOW (ref 4.2–5.8)
RBC # FLD: 18.8 % — HIGH (ref 10.3–14.5)
RH IG SCN BLD-IMP: POSITIVE — SIGNIFICANT CHANGE UP
SODIUM SERPL-SCNC: 135 MMOL/L — SIGNIFICANT CHANGE UP (ref 135–145)
WBC # BLD: 11.82 K/UL — HIGH (ref 3.8–10.5)
WBC # FLD AUTO: 11.82 K/UL — HIGH (ref 3.8–10.5)

## 2022-12-22 PROCEDURE — 71045 X-RAY EXAM CHEST 1 VIEW: CPT | Mod: 26

## 2022-12-22 PROCEDURE — 93970 EXTREMITY STUDY: CPT | Mod: 26

## 2022-12-22 PROCEDURE — 99291 CRITICAL CARE FIRST HOUR: CPT

## 2022-12-22 PROCEDURE — 99231 SBSQ HOSP IP/OBS SF/LOW 25: CPT

## 2022-12-22 PROCEDURE — 99233 SBSQ HOSP IP/OBS HIGH 50: CPT | Mod: GC

## 2022-12-22 RX ORDER — VASOPRESSIN 20 [USP'U]/ML
0.03 INJECTION INTRAVENOUS
Qty: 40 | Refills: 0 | Status: DISCONTINUED | OUTPATIENT
Start: 2022-12-22 | End: 2022-12-24

## 2022-12-22 RX ORDER — HUMAN INSULIN 100 [IU]/ML
5 INJECTION, SUSPENSION SUBCUTANEOUS EVERY 6 HOURS
Refills: 0 | Status: DISCONTINUED | OUTPATIENT
Start: 2022-12-22 | End: 2022-12-23

## 2022-12-22 RX ORDER — SENNA PLUS 8.6 MG/1
2 TABLET ORAL AT BEDTIME
Refills: 0 | Status: DISCONTINUED | OUTPATIENT
Start: 2022-12-22 | End: 2023-01-20

## 2022-12-22 RX ORDER — HUMAN INSULIN 100 [IU]/ML
2 INJECTION, SUSPENSION SUBCUTANEOUS EVERY 6 HOURS
Refills: 0 | Status: DISCONTINUED | OUTPATIENT
Start: 2022-12-22 | End: 2022-12-22

## 2022-12-22 RX ADMIN — VASOPRESSIN 4.5 UNIT(S)/MIN: 20 INJECTION INTRAVENOUS at 09:52

## 2022-12-22 RX ADMIN — Medication 15 MILLILITER(S): at 13:00

## 2022-12-22 RX ADMIN — HUMAN INSULIN 2 UNIT(S): 100 INJECTION, SUSPENSION SUBCUTANEOUS at 05:46

## 2022-12-22 RX ADMIN — HEPARIN SODIUM 5000 UNIT(S): 5000 INJECTION INTRAVENOUS; SUBCUTANEOUS at 18:18

## 2022-12-22 RX ADMIN — CHLORHEXIDINE GLUCONATE 15 MILLILITER(S): 213 SOLUTION TOPICAL at 05:48

## 2022-12-22 RX ADMIN — CHLORHEXIDINE GLUCONATE 1 APPLICATION(S): 213 SOLUTION TOPICAL at 06:45

## 2022-12-22 RX ADMIN — HEPARIN SODIUM 5000 UNIT(S): 5000 INJECTION INTRAVENOUS; SUBCUTANEOUS at 09:52

## 2022-12-22 RX ADMIN — HUMAN INSULIN 5 UNIT(S): 100 INJECTION, SUSPENSION SUBCUTANEOUS at 18:19

## 2022-12-22 RX ADMIN — Medication 2: at 18:20

## 2022-12-22 RX ADMIN — Medication 81 MILLIGRAM(S): at 13:00

## 2022-12-22 RX ADMIN — Medication 100 MILLIGRAM(S): at 13:00

## 2022-12-22 RX ADMIN — Medication 12.5 MILLIGRAM(S): at 05:48

## 2022-12-22 RX ADMIN — ATORVASTATIN CALCIUM 40 MILLIGRAM(S): 80 TABLET, FILM COATED ORAL at 21:59

## 2022-12-22 RX ADMIN — SENNA PLUS 2 TABLET(S): 8.6 TABLET ORAL at 21:59

## 2022-12-22 RX ADMIN — Medication 2: at 05:47

## 2022-12-22 RX ADMIN — CHLORHEXIDINE GLUCONATE 1 APPLICATION(S): 213 SOLUTION TOPICAL at 05:45

## 2022-12-22 RX ADMIN — Medication 6: at 13:11

## 2022-12-22 RX ADMIN — PANTOPRAZOLE SODIUM 40 MILLIGRAM(S): 20 TABLET, DELAYED RELEASE ORAL at 12:59

## 2022-12-22 RX ADMIN — CHLORHEXIDINE GLUCONATE 15 MILLILITER(S): 213 SOLUTION TOPICAL at 18:18

## 2022-12-22 RX ADMIN — HEPARIN SODIUM 5000 UNIT(S): 5000 INJECTION INTRAVENOUS; SUBCUTANEOUS at 01:25

## 2022-12-22 RX ADMIN — AMIODARONE HYDROCHLORIDE 200 MILLIGRAM(S): 400 TABLET ORAL at 05:48

## 2022-12-22 NOTE — PROGRESS NOTE ADULT - ATTENDING COMMENTS
ERIC/ATN  Required CRRT, now transitioned to intermittent hemodialysis  Received dialysis yesterday, also made  mL/24 Hrs  Remains very edematous    Perhaps we should try IV diuretics for volume management and see what he's able to do, especially because dialysis is more likely to precipitate hypotensive episodes. If he fails diuretics, can continue dialysis (but would then favor tunneled catheter). As he is not yet end-stage, AVF creation would be premature.  Dose meds for HD/eGFR<10  Maintain MAP >65  Remainder per fellow, will follow

## 2022-12-22 NOTE — PROGRESS NOTE ADULT - SUBJECTIVE AND OBJECTIVE BOX
Patient seen and examined at the bedside.    Remained critically ill on continuous ICU monitoring.      Brief Summary:  63 yo M admitted with cardiogenic shock s/p VA ECMO, decannulated 12/8/22.      24 Hour events:  OOBTC  Trach collar trials ongoing      OBJECTIVE:  Vital Signs Last 24 Hrs  T(C): 36.9 (22 Dec 2022 04:00), Max: 37 (21 Dec 2022 13:55)  T(F): 98.4 (22 Dec 2022 04:00), Max: 98.6 (21 Dec 2022 13:55)  HR: 85 (22 Dec 2022 07:00) (80 - 110)  BP: 90/53 (21 Dec 2022 18:09) (90/53 - 90/53)  BP(mean): 65 (21 Dec 2022 18:09) (65 - 65)  RR: 19 (22 Dec 2022 07:00) (18 - 29)  SpO2: 100% (22 Dec 2022 07:00) (96% - 100%)    Parameters below as of 22 Dec 2022 04:00  Patient On (Oxygen Delivery Method): tracheostomy collar    O2 Concentration (%): 40    Mode: AC/ CMV (Assist Control/ Continuous Mandatory Ventilation)  RR (machine): 12  TV (machine): 500  FiO2: 50  PEEP: 5  ITime: 1  MAP: 10  PIP: 20              Physical Exam:   General: OOB to chair, no acute distress  Neurology: Following commands  Eyes: Bilateral pupils equal and reactive   ENT/Neck: +Trach, Neck supple  Respiratory: Clear bilaterally   CV: S1S2, Sinus   Abdominal: Soft, NT, ND +BS   Extremities: Warm    -------------------------------------------------------------------------------------------------------------------------------                        8.9    11.82 )-----------( 76       ( 22 Dec 2022 00:13 )             28.5     12-22    135  |  99  |  40<H>  ----------------------------<  179<H>  4.5   |  26  |  2.74<H>    Ca    7.8<L>      22 Dec 2022 00:13  Phos  3.7     12-22  Mg     2.4     12-22    TPro  6.4  /  Alb  2.9<L>  /  TBili  0.6  /  DBili  x   /  AST  52<H>  /  ALT  34  /  AlkPhos  95  12-22    LIVER FUNCTIONS - ( 22 Dec 2022 00:13 )  Alb: 2.9 g/dL / Pro: 6.4 g/dL / ALK PHOS: 95 U/L / ALT: 34 U/L / AST: 52 U/L / GGT: x             ABG - ( 22 Dec 2022 00:00 )  pH, Arterial: 7.45  pH, Blood: x     /  pCO2: 39    /  pO2: 112   / HCO3: 27    / Base Excess: 2.9   /  SaO2: 99.0              ------------------------------------------------------------------------------------------------------------------------------  Assessment:  61 y/o male with PMH of CABG, DM, HTN, HLD presenting as transfer from [place] for c/f STEMI.     Cardiogenic shock s/p VA ECMO decannulated 12/8  Mitral regurgitation s/p Mitral clip x1 12/16/22  Acute respiratory distress/failure s/p Tracheostomy 12/16/22   IABP placed ( IABP dc'ed 12/17)   At high risk for hemodynamic instability  ERIC  Thrombocytopenia  Leukocytosis  Anemia  Acute pancreatitis      Plan:   ***Neuro***  CT head showed 4mm subdural hematoma 11/26: repeat CT head unchanged 12/01  Soft palate laceration, ENT scoped 12/8, stable, no acute intervention at this time  Repeat Head CT 12/13 Similar minimal increased density along the right aspect of the posterior   falx and right tentorial leaf, consistent with minimal residual subdural   hemorrhage.  No parenchymal hemorrhage or brain edema.  Acute pain control with Tylenol and prns  PT ongoing.    ***Cardiovascular***  At high risk for hemodynamic instability and cardiac arrhythmias.  TTE 12/19 showed EF 24% with normal RV function  Started on beta blocker Lopressor 12.5 BID   SQ Heparin for VTE prophylaxis   PO Amiodarone for rate control  ASA /Statin daily    ***Pulmonary***  Respiratory failure s/p Tracheostomy - continue TC trials  Wean ventilator as tolerated.   Deep breathing and coughing exercises.  Wean oxygen as able.    ***GI***  Protein calorie malnutrition - Tube feeds via nasal feeding tube  Pancreatitis - Amylase and lipase elevated, will trend.  Protonix for stress ulcer prophylaxis   Bowel regimen with senna    ***Renal***  ERIC, renal following, tolerating HD  Replete electrolytes as indicated.    ***ID***  Leukocytosis - off antibiotics currently, will trend.  Appreciate ID input    ***Endocrine***  Hx of DM2, continue glycemic control w/ ISS and  NPH.    ***Hematology***  Acute blood loss anemia and thrombocytopenia - no transfusion indication currently        Patient requires continuous monitoring with bedside rhythm monitoring, pulse oximetry monitoring, and continuous central venous and arterial pressure monitoring; and intermittent blood gas analysis. Care plan discussed with the ICU care team.   Patient remained critical, at risk for life threatening decompensation.    I have spent 30 minutes providing critical care management to this patient.    By signing my name below, I, Maria D'Amico, attest that this documentation has been prepared under the direction and in the presence of Jade Rosas MD  Electronically signed: Maria D'Amico, Scribe, 12-22-22 @ 07:35    I, Jade Rosas MD, personally performed the services described in this documentation. all medical record entries made by the scribe were at my direction and in my presence. I have reviewed the chart and agree that the record reflects my personal performance and is accurate and complete  Electronically signed: Jade Rosas MD Patient seen and examined at the bedside.    Remained critically ill on continuous ICU monitoring.      Brief Summary:  63 yo M admitted with cardiogenic shock s/p VA ECMO, decannulated 12/8/22.      24 Hour events:  OOBTC  Trach collar trials ongoing      OBJECTIVE:  Vital Signs Last 24 Hrs  T(C): 36.9 (22 Dec 2022 04:00), Max: 37 (21 Dec 2022 13:55)  T(F): 98.4 (22 Dec 2022 04:00), Max: 98.6 (21 Dec 2022 13:55)  HR: 85 (22 Dec 2022 07:00) (80 - 110)  BP: 90/53 (21 Dec 2022 18:09) (90/53 - 90/53)  BP(mean): 65 (21 Dec 2022 18:09) (65 - 65)  RR: 19 (22 Dec 2022 07:00) (18 - 29)  SpO2: 100% (22 Dec 2022 07:00) (96% - 100%)    Parameters below as of 22 Dec 2022 04:00  Patient On (Oxygen Delivery Method): tracheostomy collar    O2 Concentration (%): 40    Mode: AC/ CMV (Assist Control/ Continuous Mandatory Ventilation)  RR (machine): 12  TV (machine): 500  FiO2: 50  PEEP: 5  ITime: 1  MAP: 10  PIP: 20              Physical Exam:   General: OOB to chair, no acute distress  Neurology: Following commands  Eyes: Bilateral pupils equal and reactive   ENT/Neck: +Trach, Neck supple  Respiratory: Clear bilaterally   CV: S1S2, Sinus   Abdominal: Soft, NT, ND +BS   Extremities: Warm    -------------------------------------------------------------------------------------------------------------------------------                        8.9    11.82 )-----------( 76       ( 22 Dec 2022 00:13 )             28.5     12-22    135  |  99  |  40<H>  ----------------------------<  179<H>  4.5   |  26  |  2.74<H>    Ca    7.8<L>      22 Dec 2022 00:13  Phos  3.7     12-22  Mg     2.4     12-22    TPro  6.4  /  Alb  2.9<L>  /  TBili  0.6  /  DBili  x   /  AST  52<H>  /  ALT  34  /  AlkPhos  95  12-22    LIVER FUNCTIONS - ( 22 Dec 2022 00:13 )  Alb: 2.9 g/dL / Pro: 6.4 g/dL / ALK PHOS: 95 U/L / ALT: 34 U/L / AST: 52 U/L / GGT: x             ABG - ( 22 Dec 2022 00:00 )  pH, Arterial: 7.45  pH, Blood: x     /  pCO2: 39    /  pO2: 112   / HCO3: 27    / Base Excess: 2.9   /  SaO2: 99.0              ------------------------------------------------------------------------------------------------------------------------------  Assessment:  61 y/o male with PMH of CABG, DM, HTN, HLD presenting as transfer from [place] for c/f STEMI.     Cardiogenic shock s/p VA ECMO decannulated 12/8  Mitral regurgitation s/p Mitral clip x1 12/16/22  Acute respiratory distress/failure s/p Tracheostomy 12/16/22   IABP placed ( IABP dc'ed 12/17)   At high risk for hemodynamic instability  ERIC  Thrombocytopenia  Leukocytosis  Anemia  Acute pancreatitis      Plan:   ***Neuro***  CT head showed 4mm subdural hematoma 11/26: repeat CT head unchanged 12/01  Soft palate laceration, ENT scoped 12/8, stable, no acute intervention at this time  Repeat Head CT 12/13 Similar minimal increased density along the right aspect of the posterior   falx and right tentorial leaf, consistent with minimal residual subdural   hemorrhage.  No parenchymal hemorrhage or brain edema.  Acute pain control with Tylenol and prns  PT ongoing.    ***Cardiovascular***  At high risk for hemodynamic instability and cardiac arrhythmias.  TTE 12/19 showed EF 24% with normal RV function  Started on beta blocker Lopressor 12.5 BID   SQ Heparin for VTE prophylaxis   PO Amiodarone for rate control  ASA /Statin daily    ***Pulmonary***  Respiratory failure s/p Tracheostomy - continue TC trials  Wean ventilator as tolerated.   Deep breathing and coughing exercises.  Wean oxygen as able.    ***GI***  Protein calorie malnutrition - Tube feeds via nasal feeding tube  Pancreatitis - Amylase and lipase elevated, will trend.  Protonix for stress ulcer prophylaxis   Bowel regimen with senna    ***Renal***  ERIC, renal following, tolerating HD  Replete electrolytes as indicated.    ***ID***  Leukocytosis - off antibiotics currently, will trend.  Appreciate ID input    ***Endocrine***  Hx of DM2, continue glycemic control w/ ISS and  NPH.    ***Hematology***  Acute blood loss anemia and thrombocytopenia - no transfusion indication currently        Patient requires continuous monitoring with bedside rhythm monitoring, pulse oximetry monitoring, and continuous central venous and arterial pressure monitoring; and intermittent blood gas analysis. Care plan discussed with the ICU care team.   Patient remained critical, at risk for life threatening decompensation.    I have spent 30 minutes providing critical care management to this patient.    By signing my name below, I, Maria D'Amico, attest that this documentation has been prepared under the direction and in the presence of Jade Rosas MD  Electronically signed: Maria D'Amico, Scribe, 12-22-22 @ 07:35    I, Jade Rosas MD, personally performed the services described in this documentation. all medical record entries made by the scribe were at my direction and in my presence. I have reviewed the chart and agree that the record reflects my personal performance and is accurate and complete  Electronically signed: Jade Rosas MD Patient seen and examined at the bedside.    Remained critically ill on continuous ICU monitoring.      Brief Summary:  61 yo M admitted with cardiogenic shock s/p VA ECMO, decannulated 12/8/22.      24 Hour events:  OOBTC  Trach collar trials ongoing      OBJECTIVE:  Vital Signs Last 24 Hrs  T(C): 36.9 (22 Dec 2022 04:00), Max: 37 (21 Dec 2022 13:55)  T(F): 98.4 (22 Dec 2022 04:00), Max: 98.6 (21 Dec 2022 13:55)  HR: 85 (22 Dec 2022 07:00) (80 - 110)  BP: 90/53 (21 Dec 2022 18:09) (90/53 - 90/53)  BP(mean): 65 (21 Dec 2022 18:09) (65 - 65)  RR: 19 (22 Dec 2022 07:00) (18 - 29)  SpO2: 100% (22 Dec 2022 07:00) (96% - 100%)    Parameters below as of 22 Dec 2022 04:00  Patient On (Oxygen Delivery Method): tracheostomy collar    O2 Concentration (%): 40    Mode: AC/ CMV (Assist Control/ Continuous Mandatory Ventilation)  RR (machine): 12  TV (machine): 500  FiO2: 50  PEEP: 5  ITime: 1  MAP: 10  PIP: 20              Physical Exam:   General: OOB to chair, no acute distress  Neurology: Following commands  Eyes: Bilateral pupils equal and reactive   ENT/Neck: +Trach, Neck supple  Respiratory: Clear bilaterally   CV: S1S2, Sinus   Abdominal: Soft, NT, ND +BS   Extremities: Warm    -------------------------------------------------------------------------------------------------------------------------------                        8.9    11.82 )-----------( 76       ( 22 Dec 2022 00:13 )             28.5     12-22    135  |  99  |  40<H>  ----------------------------<  179<H>  4.5   |  26  |  2.74<H>    Ca    7.8<L>      22 Dec 2022 00:13  Phos  3.7     12-22  Mg     2.4     12-22    TPro  6.4  /  Alb  2.9<L>  /  TBili  0.6  /  DBili  x   /  AST  52<H>  /  ALT  34  /  AlkPhos  95  12-22    LIVER FUNCTIONS - ( 22 Dec 2022 00:13 )  Alb: 2.9 g/dL / Pro: 6.4 g/dL / ALK PHOS: 95 U/L / ALT: 34 U/L / AST: 52 U/L / GGT: x             ABG - ( 22 Dec 2022 00:00 )  pH, Arterial: 7.45  pH, Blood: x     /  pCO2: 39    /  pO2: 112   / HCO3: 27    / Base Excess: 2.9   /  SaO2: 99.0              ------------------------------------------------------------------------------------------------------------------------------  Assessment:  61 y/o male with PMH of CABG, DM, HTN, HLD presenting as transfer from [place] for c/f STEMI.     Cardiogenic shock s/p VA ECMO decannulated 12/8  Mitral regurgitation s/p Mitral clip x1 12/16/22  Acute respiratory distress/failure s/p Tracheostomy 12/16/22   IABP placed ( IABP dc'ed 12/17)   At high risk for hemodynamic instability  ERIC  Thrombocytopenia  Leukocytosis  Anemia  Acute pancreatitis      Plan:   ***Neuro***  CT head showed 4mm subdural hematoma 11/26: repeat CT head unchanged 12/01  Soft palate laceration, ENT scoped 12/8, stable, no acute intervention at this time  Repeat Head CT 12/13 Similar minimal increased density along the right aspect of the posterior   falx and right tentorial leaf, consistent with minimal residual subdural   hemorrhage.  No parenchymal hemorrhage or brain edema.  Acute pain control with Tylenol and prns  PT ongoing.    ***Cardiovascular***  At high risk for hemodynamic instability and cardiac arrhythmias.  TTE 12/19 showed EF 24% with normal RV function  Started on beta blocker Lopressor 12.5 BID   SQ Heparin for VTE prophylaxis   PO Amiodarone for rate control  ASA /Statin daily    ***Pulmonary***  Respiratory failure s/p Tracheostomy - continue TC trials  Wean ventilator as tolerated.   Deep breathing and coughing exercises.  Wean oxygen as able.    ***GI***  Protein calorie malnutrition - Tube feeds via nasal feeding tube  Pancreatitis - Amylase and lipase elevated, will trend.  Protonix for stress ulcer prophylaxis   Bowel regimen with senna    ***Renal***  ERIC, renal following, tolerating HD  Replete electrolytes as indicated.    ***ID***  Leukocytosis - off antibiotics currently, will trend.  Appreciate ID input    ***Endocrine***  Hx of DM2, continue glycemic control w/ ISS and  NPH.    ***Hematology***  Acute blood loss anemia and thrombocytopenia - no transfusion indication currently        Patient requires continuous monitoring with bedside rhythm monitoring, pulse oximetry monitoring, and continuous central venous and arterial pressure monitoring; and intermittent blood gas analysis. Care plan discussed with the ICU care team.   Patient remained critical, at risk for life threatening decompensation.    I have spent 30 minutes providing critical care management to this patient.    By signing my name below, I, Maria D'Amico, attest that this documentation has been prepared under the direction and in the presence of Jade Rosas MD  Electronically signed: Maria D'Amico, Scribe, 12-22-22 @ 07:35    I, Jade Rosas MD, personally performed the services described in this documentation. all medical record entries made by the scribe were at my direction and in my presence. I have reviewed the chart and agree that the record reflects my personal performance and is accurate and complete  Electronically signed: Jade Rosas MD Patient seen and examined at the bedside.    Remained critically ill on continuous ICU monitoring.      Brief Summary:  61 yo M admitted with cardiogenic shock s/p VA ECMO, decannulated 12/8/22.      24 Hour events:  OOBTC  Trach collar trials ongoing  Received 2 L HD yesterday      OBJECTIVE:  Vital Signs Last 24 Hrs  T(C): 36.9 (22 Dec 2022 04:00), Max: 37 (21 Dec 2022 13:55)  T(F): 98.4 (22 Dec 2022 04:00), Max: 98.6 (21 Dec 2022 13:55)  HR: 85 (22 Dec 2022 07:00) (80 - 110)  BP: 90/53 (21 Dec 2022 18:09) (90/53 - 90/53)  BP(mean): 65 (21 Dec 2022 18:09) (65 - 65)  RR: 19 (22 Dec 2022 07:00) (18 - 29)  SpO2: 100% (22 Dec 2022 07:00) (96% - 100%)    Parameters below as of 22 Dec 2022 04:00  Patient On (Oxygen Delivery Method): tracheostomy collar    O2 Concentration (%): 40    Mode: AC/ CMV (Assist Control/ Continuous Mandatory Ventilation)  RR (machine): 12  TV (machine): 500  FiO2: 50  PEEP: 5  ITime: 1  MAP: 10  PIP: 20              Physical Exam:   General: OOB to chair, no acute distress  Neurology: Following commands  Eyes: Bilateral pupils equal and reactive   ENT/Neck: +Trach, Neck supple  Respiratory: Clear bilaterally   CV: S1S2, Sinus   Abdominal: Soft, NT, ND +BS   Extremities: Warm    -------------------------------------------------------------------------------------------------------------------------------                        8.9    11.82 )-----------( 76       ( 22 Dec 2022 00:13 )             28.5     12-22    135  |  99  |  40<H>  ----------------------------<  179<H>  4.5   |  26  |  2.74<H>    Ca    7.8<L>      22 Dec 2022 00:13  Phos  3.7     12-22  Mg     2.4     12-22    TPro  6.4  /  Alb  2.9<L>  /  TBili  0.6  /  DBili  x   /  AST  52<H>  /  ALT  34  /  AlkPhos  95  12-22    LIVER FUNCTIONS - ( 22 Dec 2022 00:13 )  Alb: 2.9 g/dL / Pro: 6.4 g/dL / ALK PHOS: 95 U/L / ALT: 34 U/L / AST: 52 U/L / GGT: x             ABG - ( 22 Dec 2022 00:00 )  pH, Arterial: 7.45  pH, Blood: x     /  pCO2: 39    /  pO2: 112   / HCO3: 27    / Base Excess: 2.9   /  SaO2: 99.0              ------------------------------------------------------------------------------------------------------------------------------  Assessment:  61 y/o male with PMH of CABG, DM, HTN, HLD presenting as transfer from [place] for c/f STEMI.     Cardiogenic shock s/p VA ECMO decannulated 12/8  Mitral regurgitation s/p Mitral clip x1 12/16/22  Acute respiratory distress/failure s/p Tracheostomy 12/16/22   IABP placed ( IABP dc'ed 12/17)   At high risk for hemodynamic instability  ERIC  Thrombocytopenia  Leukocytosis  Anemia  Acute pancreatitis      Plan:   ***Neuro***  CT head showed 4mm subdural hematoma 11/26: repeat CT head unchanged 12/01  Soft palate laceration, ENT scoped 12/8, stable, no acute intervention at this time  Repeat Head CT 12/13 Similar minimal increased density along the right aspect of the posterior   falx and right tentorial leaf, consistent with minimal residual subdural   hemorrhage.  No parenchymal hemorrhage or brain edema.  Acute pain control with Tylenol and prns  PT ongoing.    ***Cardiovascular***  At high risk for hemodynamic instability and cardiac arrhythmias.  TTE 12/19 showed EF 24% with normal RV function  Started on beta blocker Lopressor 12.5 BID   SQ Heparin for VTE prophylaxis   PO Amiodarone for rate control  Wean Vasopressin as able  ASA /Statin daily    ***Pulmonary***  Respiratory failure s/p Tracheostomy - continue TC trials  Wean ventilator as tolerated.   Deep breathing and coughing exercises.  Wean oxygen as able.    ***GI***  Protein calorie malnutrition - Tube feeds via nasal feeding tube  Pancreatitis - Amylase and lipase elevated, will trend.  Protonix for stress ulcer prophylaxis   Bowel regimen with senna    ***Renal***  ERIC, renal following, tolerating HD, 2 L removed yesterday  Replete electrolytes as indicated.    ***ID***  no current antibiotic coverage  Leukocytosis 14.66 -> 11.82, continue to trend.  Appreciate ID input    ***Endocrine***  Hx of DM2, continue glycemic control w/ ISS and  NPH.    ***Hematology***  Acute blood loss anemia and thrombocytopenia - no transfusion indication currently        Patient requires continuous monitoring with bedside rhythm monitoring, pulse oximetry monitoring, and continuous central venous and arterial pressure monitoring; and intermittent blood gas analysis. Care plan discussed with the ICU care team.   Patient remained critical, at risk for life threatening decompensation.    I have spent 30 minutes providing critical care management to this patient.    By signing my name below, I, Maria D'Amico, attest that this documentation has been prepared under the direction and in the presence of Jade Rosas MD  Electronically signed: Maria D'Amico, Scribe, 12-22-22 @ 07:35    I, Jade Rosas MD, personally performed the services described in this documentation. all medical record entries made by the scribe were at my direction and in my presence. I have reviewed the chart and agree that the record reflects my personal performance and is accurate and complete  Electronically signed: Jade Rosas MD Patient seen and examined at the bedside.    Remained critically ill on continuous ICU monitoring.      Brief Summary:  63 yo M admitted with cardiogenic shock s/p VA ECMO, decannulated 12/8/22.      24 Hour events:  OOBTC  Trach collar trials ongoing  Received 2 L HD yesterday      OBJECTIVE:  Vital Signs Last 24 Hrs  T(C): 36.9 (22 Dec 2022 04:00), Max: 37 (21 Dec 2022 13:55)  T(F): 98.4 (22 Dec 2022 04:00), Max: 98.6 (21 Dec 2022 13:55)  HR: 85 (22 Dec 2022 07:00) (80 - 110)  BP: 90/53 (21 Dec 2022 18:09) (90/53 - 90/53)  BP(mean): 65 (21 Dec 2022 18:09) (65 - 65)  RR: 19 (22 Dec 2022 07:00) (18 - 29)  SpO2: 100% (22 Dec 2022 07:00) (96% - 100%)    Parameters below as of 22 Dec 2022 04:00  Patient On (Oxygen Delivery Method): tracheostomy collar    O2 Concentration (%): 40    Mode: AC/ CMV (Assist Control/ Continuous Mandatory Ventilation)  RR (machine): 12  TV (machine): 500  FiO2: 50  PEEP: 5  ITime: 1  MAP: 10  PIP: 20              Physical Exam:   General: OOB to chair, no acute distress  Neurology: Following commands  Eyes: Bilateral pupils equal and reactive   ENT/Neck: +Trach, Neck supple  Respiratory: Clear bilaterally   CV: S1S2, Sinus   Abdominal: Soft, NT, ND +BS   Extremities: Warm    -------------------------------------------------------------------------------------------------------------------------------                        8.9    11.82 )-----------( 76       ( 22 Dec 2022 00:13 )             28.5     12-22    135  |  99  |  40<H>  ----------------------------<  179<H>  4.5   |  26  |  2.74<H>    Ca    7.8<L>      22 Dec 2022 00:13  Phos  3.7     12-22  Mg     2.4     12-22    TPro  6.4  /  Alb  2.9<L>  /  TBili  0.6  /  DBili  x   /  AST  52<H>  /  ALT  34  /  AlkPhos  95  12-22    LIVER FUNCTIONS - ( 22 Dec 2022 00:13 )  Alb: 2.9 g/dL / Pro: 6.4 g/dL / ALK PHOS: 95 U/L / ALT: 34 U/L / AST: 52 U/L / GGT: x             ABG - ( 22 Dec 2022 00:00 )  pH, Arterial: 7.45  pH, Blood: x     /  pCO2: 39    /  pO2: 112   / HCO3: 27    / Base Excess: 2.9   /  SaO2: 99.0              ------------------------------------------------------------------------------------------------------------------------------  Assessment:  61 y/o male with PMH of CABG, DM, HTN, HLD presenting as transfer from [place] for c/f STEMI.     Cardiogenic shock s/p VA ECMO decannulated 12/8  Mitral regurgitation s/p Mitral clip x1 12/16/22  Acute respiratory distress/failure s/p Tracheostomy 12/16/22   IABP placed ( IABP dc'ed 12/17)   At high risk for hemodynamic instability  ERIC  Thrombocytopenia  Leukocytosis  Anemia  Acute pancreatitis      Plan:   ***Neuro***  CT head showed 4mm subdural hematoma 11/26: repeat CT head unchanged 12/01  Soft palate laceration, ENT scoped 12/8, stable, no acute intervention at this time  Repeat Head CT 12/13 Similar minimal increased density along the right aspect of the posterior   falx and right tentorial leaf, consistent with minimal residual subdural   hemorrhage.  No parenchymal hemorrhage or brain edema.  Acute pain control with Tylenol and prns  PT ongoing.    ***Cardiovascular***  At high risk for hemodynamic instability and cardiac arrhythmias.  TTE 12/19 showed EF 24% with normal RV function  Started on beta blocker Lopressor 12.5 BID   SQ Heparin for VTE prophylaxis   PO Amiodarone for rate control  Wean Vasopressin as able  ASA /Statin daily    ***Pulmonary***  Respiratory failure s/p Tracheostomy - continue TC trials  Wean ventilator as tolerated.   Deep breathing and coughing exercises.  Wean oxygen as able.    ***GI***  Protein calorie malnutrition - Tube feeds via nasal feeding tube  Pancreatitis - Amylase and lipase elevated, will trend.  Protonix for stress ulcer prophylaxis   Bowel regimen with senna    ***Renal***  ERIC, renal following, tolerating HD, 2 L removed yesterday  Replete electrolytes as indicated.    ***ID***  no current antibiotic coverage  Leukocytosis 14.66 -> 11.82, continue to trend.  Appreciate ID input    ***Endocrine***  Hx of DM2, continue glycemic control w/ ISS and  NPH.    ***Hematology***  Acute blood loss anemia and thrombocytopenia - no transfusion indication currently        Patient requires continuous monitoring with bedside rhythm monitoring, pulse oximetry monitoring, and continuous central venous and arterial pressure monitoring; and intermittent blood gas analysis. Care plan discussed with the ICU care team.   Patient remained critical, at risk for life threatening decompensation.    I have spent 30 minutes providing critical care management to this patient.    By signing my name below, I, Maria D'Amico, attest that this documentation has been prepared under the direction and in the presence of Jade Rosas MD  Electronically signed: Maria D'Amico, Scribe, 12-22-22 @ 07:35    I, Jade Rosas MD, personally performed the services described in this documentation. all medical record entries made by the scribe were at my direction and in my presence. I have reviewed the chart and agree that the record reflects my personal performance and is accurate and complete  Electronically signed: Jade Rosas MD Patient seen and examined at the bedside.    Remained critically ill on continuous ICU monitoring.      Brief Summary:  63 yo M admitted with cardiogenic shock s/p VA ECMO, decannulated 12/8/22.      24 Hour events:  OOBTC  Trach collar trials ongoing  Received 2 L HD yesterday      OBJECTIVE:  Vital Signs Last 24 Hrs  T(C): 36.9 (22 Dec 2022 04:00), Max: 37 (21 Dec 2022 13:55)  T(F): 98.4 (22 Dec 2022 04:00), Max: 98.6 (21 Dec 2022 13:55)  HR: 85 (22 Dec 2022 07:00) (80 - 110)  BP: 90/53 (21 Dec 2022 18:09) (90/53 - 90/53)  BP(mean): 65 (21 Dec 2022 18:09) (65 - 65)  RR: 19 (22 Dec 2022 07:00) (18 - 29)  SpO2: 100% (22 Dec 2022 07:00) (96% - 100%)    Parameters below as of 22 Dec 2022 04:00  Patient On (Oxygen Delivery Method): tracheostomy collar    O2 Concentration (%): 40    Mode: AC/ CMV (Assist Control/ Continuous Mandatory Ventilation)  RR (machine): 12  TV (machine): 500  FiO2: 50  PEEP: 5  ITime: 1  MAP: 10  PIP: 20              Physical Exam:   General: OOB to chair, no acute distress  Neurology: Following commands  Eyes: Bilateral pupils equal and reactive   ENT/Neck: +Trach, Neck supple  Respiratory: Clear bilaterally   CV: S1S2, Sinus   Abdominal: Soft, NT, ND +BS   Extremities: Warm    -------------------------------------------------------------------------------------------------------------------------------                        8.9    11.82 )-----------( 76       ( 22 Dec 2022 00:13 )             28.5     12-22    135  |  99  |  40<H>  ----------------------------<  179<H>  4.5   |  26  |  2.74<H>    Ca    7.8<L>      22 Dec 2022 00:13  Phos  3.7     12-22  Mg     2.4     12-22    TPro  6.4  /  Alb  2.9<L>  /  TBili  0.6  /  DBili  x   /  AST  52<H>  /  ALT  34  /  AlkPhos  95  12-22    LIVER FUNCTIONS - ( 22 Dec 2022 00:13 )  Alb: 2.9 g/dL / Pro: 6.4 g/dL / ALK PHOS: 95 U/L / ALT: 34 U/L / AST: 52 U/L / GGT: x             ABG - ( 22 Dec 2022 00:00 )  pH, Arterial: 7.45  pH, Blood: x     /  pCO2: 39    /  pO2: 112   / HCO3: 27    / Base Excess: 2.9   /  SaO2: 99.0              ------------------------------------------------------------------------------------------------------------------------------  Assessment:  61 y/o male with PMH of CABG, DM, HTN, HLD presenting as transfer from [place] for c/f STEMI.     Cardiogenic shock s/p VA ECMO decannulated 12/8  Mitral regurgitation s/p Mitral clip x1 12/16/22  Acute respiratory distress/failure s/p Tracheostomy 12/16/22   IABP placed ( IABP dc'ed 12/17)   At high risk for hemodynamic instability  ERIC  Thrombocytopenia  Leukocytosis  Anemia  Acute pancreatitis      Plan:   ***Neuro***  CT head showed 4mm subdural hematoma 11/26: repeat CT head unchanged 12/01  Soft palate laceration, ENT scoped 12/8, stable, no acute intervention at this time  Repeat Head CT 12/13 Similar minimal increased density along the right aspect of the posterior   falx and right tentorial leaf, consistent with minimal residual subdural   hemorrhage.  No parenchymal hemorrhage or brain edema.  Acute pain control with Tylenol and prns  PT ongoing.    ***Cardiovascular***  At high risk for hemodynamic instability and cardiac arrhythmias.  TTE 12/19 showed EF 24% with normal RV function  Started on beta blocker Lopressor 12.5 BID   SQ Heparin for VTE prophylaxis   PO Amiodarone for rate control  Wean Vasopressin as able  ASA /Statin daily    ***Pulmonary***  Respiratory failure s/p Tracheostomy - continue TC trials  Wean ventilator as tolerated.   Deep breathing and coughing exercises.  Wean oxygen as able.    ***GI***  Protein calorie malnutrition - Tube feeds via nasal feeding tube  Pancreatitis - Amylase and lipase elevated, will trend.  Protonix for stress ulcer prophylaxis   Bowel regimen with senna    ***Renal***  ERIC, renal following, tolerating HD, 2 L removed yesterday  Replete electrolytes as indicated.    ***ID***  no current antibiotic coverage  Leukocytosis 14.66 -> 11.82, continue to trend.  Appreciate ID input    ***Endocrine***  Hx of DM2, continue glycemic control w/ ISS and  NPH.    ***Hematology***  Acute blood loss anemia and thrombocytopenia - no transfusion indication currently        Patient requires continuous monitoring with bedside rhythm monitoring, pulse oximetry monitoring, and continuous central venous and arterial pressure monitoring; and intermittent blood gas analysis. Care plan discussed with the ICU care team.   Patient remained critical, at risk for life threatening decompensation.    I have spent 30 minutes providing critical care management to this patient.    By signing my name below, I, Maria D'Amico, attest that this documentation has been prepared under the direction and in the presence of Jade Rosas MD  Electronically signed: Maria D'Amico, Scribe, 12-22-22 @ 07:35    I, Jade Rosas MD, personally performed the services described in this documentation. all medical record entries made by the scribe were at my direction and in my presence. I have reviewed the chart and agree that the record reflects my personal performance and is accurate and complete  Electronically signed: Jade Rosas MD Patient seen and examined at the bedside.    Remained critically ill on continuous ICU monitoring.      Brief Summary:  63 yo M admitted with cardiogenic shock s/p VA ECMO, decannulated 12/8/22.      24 Hour events:  OOBTC  On/off low dose Vasopressin  Trach collar trials ongoing  HD Yesterday - 2 Liters removed      OBJECTIVE:  Vital Signs Last 24 Hrs  T(C): 36.9 (22 Dec 2022 04:00), Max: 37 (21 Dec 2022 13:55)  T(F): 98.4 (22 Dec 2022 04:00), Max: 98.6 (21 Dec 2022 13:55)  HR: 85 (22 Dec 2022 07:00) (80 - 110)  BP: 90/53 (21 Dec 2022 18:09) (90/53 - 90/53)  BP(mean): 65 (21 Dec 2022 18:09) (65 - 65)  RR: 19 (22 Dec 2022 07:00) (18 - 29)  SpO2: 100% (22 Dec 2022 07:00) (96% - 100%)    Parameters below as of 22 Dec 2022 04:00  Patient On (Oxygen Delivery Method): tracheostomy collar    O2 Concentration (%): 40    Mode: AC/ CMV (Assist Control/ Continuous Mandatory Ventilation)  RR (machine): 12  TV (machine): 500  FiO2: 50  PEEP: 5  ITime: 1  MAP: 10  PIP: 20              Physical Exam:   General: OOB to chair, no acute distress  Neurology: Following commands  Eyes: Bilateral pupils equal and reactive   ENT/Neck: +Trach, Neck supple  Respiratory: Clear bilaterally   CV: S1S2, Sinus   Abdominal: Soft, NT, ND +BS   Extremities: Warm, with edema    -------------------------------------------------------------------------------------------------------------------------------                        8.9    11.82 )-----------( 76       ( 22 Dec 2022 00:13 )             28.5     12-22    135  |  99  |  40<H>  ----------------------------<  179<H>  4.5   |  26  |  2.74<H>    Ca    7.8<L>      22 Dec 2022 00:13  Phos  3.7     12-22  Mg     2.4     12-22    TPro  6.4  /  Alb  2.9<L>  /  TBili  0.6  /  DBili  x   /  AST  52<H>  /  ALT  34  /  AlkPhos  95  12-22    LIVER FUNCTIONS - ( 22 Dec 2022 00:13 )  Alb: 2.9 g/dL / Pro: 6.4 g/dL / ALK PHOS: 95 U/L / ALT: 34 U/L / AST: 52 U/L / GGT: x             ABG - ( 22 Dec 2022 00:00 )  pH, Arterial: 7.45  pH, Blood: x     /  pCO2: 39    /  pO2: 112   / HCO3: 27    / Base Excess: 2.9   /  SaO2: 99.0          ------------------------------------------------------------------------------------------------------------------------------  Assessment:  61 y/o male with PMH of CABG, DM, HTN, HLD presenting as transfer from [place] for c/f STEMI.     Cardiogenic shock s/p VA ECMO decannulated 12/8  Mitral regurgitation s/p Mitral clip x1 12/16/22  Acute respiratory distress/failure s/p Tracheostomy 12/16/22   IABP placed ( IABP dc'ed 12/17)   At high risk for hemodynamic instability  Anthony/Renal failure  Thrombocytopenia  Anemia      Plan:   ***Neuro***  History of SDH  PT ongoing.    ***Cardiovascular***  At high risk for hemodynamic instability and cardiac arrhythmias.  TTE 12/19 showed EF 24% with normal RV function  Beta blocker for heart rate control as BP allows.  Wean Vasopressin as able  PO Amiodarone   SQ Heparin for VTE prophylaxis   ASA /Statin daily    ***Pulmonary***  Respiratory failure s/p Tracheostomy - continue TC trials  Wean ventilator as tolerated.   Deep breathing and coughing exercises.    ***GI***  Protein calorie malnutrition - Tube feeds via nasal feeding tube  Protonix for stress ulcer prophylaxis   Bowel regimen with senna    ***Renal***  Renal failure - renal following, tolerating HD, 2 L removed yesterday  Replete electrolytes as indicated.  Vascular surgery consulted for fistula evaluation.    ***ID***  Off antibiotics  Leukocytosis 14.66 -> 11.82, continue to trend.  Appreciate ID input    ***Endocrine***  Hx of DM2, continue glycemic control w/ ISS and  NPH.    ***Hematology***  Acute blood loss anemia and thrombocytopenia - no transfusion indication currently        Patient requires continuous monitoring with bedside rhythm monitoring, pulse oximetry monitoring, and continuous central venous and arterial pressure monitoring; and intermittent blood gas analysis. Care plan discussed with the ICU care team.   Patient remained critical, at risk for life threatening decompensation.    I have spent 35 minutes providing critical care management to this patient.    By signing my name below, I, Maria D'Amico, attest that this documentation has been prepared under the direction and in the presence of Jade Rosas MD  Electronically signed: Maria D'Amico, Scribe, 12-22-22 @ 07:35    I, Jade Rosas MD, personally performed the services described in this documentation. all medical record entries made by the scribe were at my direction and in my presence. I have reviewed the chart and agree that the record reflects my personal performance and is accurate and complete  Electronically signed: Jade Rosas MD Patient seen and examined at the bedside.    Remained critically ill on continuous ICU monitoring.      Brief Summary:  61 yo M admitted with cardiogenic shock s/p VA ECMO, decannulated 12/8/22.      24 Hour events:  OOBTC  On/off low dose Vasopressin  Trach collar trials ongoing  HD Yesterday - 2 Liters removed      OBJECTIVE:  Vital Signs Last 24 Hrs  T(C): 36.9 (22 Dec 2022 04:00), Max: 37 (21 Dec 2022 13:55)  T(F): 98.4 (22 Dec 2022 04:00), Max: 98.6 (21 Dec 2022 13:55)  HR: 85 (22 Dec 2022 07:00) (80 - 110)  BP: 90/53 (21 Dec 2022 18:09) (90/53 - 90/53)  BP(mean): 65 (21 Dec 2022 18:09) (65 - 65)  RR: 19 (22 Dec 2022 07:00) (18 - 29)  SpO2: 100% (22 Dec 2022 07:00) (96% - 100%)    Parameters below as of 22 Dec 2022 04:00  Patient On (Oxygen Delivery Method): tracheostomy collar    O2 Concentration (%): 40    Mode: AC/ CMV (Assist Control/ Continuous Mandatory Ventilation)  RR (machine): 12  TV (machine): 500  FiO2: 50  PEEP: 5  ITime: 1  MAP: 10  PIP: 20              Physical Exam:   General: OOB to chair, no acute distress  Neurology: Following commands  Eyes: Bilateral pupils equal and reactive   ENT/Neck: +Trach, Neck supple  Respiratory: Clear bilaterally   CV: S1S2, Sinus   Abdominal: Soft, NT, ND +BS   Extremities: Warm, with edema    -------------------------------------------------------------------------------------------------------------------------------                        8.9    11.82 )-----------( 76       ( 22 Dec 2022 00:13 )             28.5     12-22    135  |  99  |  40<H>  ----------------------------<  179<H>  4.5   |  26  |  2.74<H>    Ca    7.8<L>      22 Dec 2022 00:13  Phos  3.7     12-22  Mg     2.4     12-22    TPro  6.4  /  Alb  2.9<L>  /  TBili  0.6  /  DBili  x   /  AST  52<H>  /  ALT  34  /  AlkPhos  95  12-22    LIVER FUNCTIONS - ( 22 Dec 2022 00:13 )  Alb: 2.9 g/dL / Pro: 6.4 g/dL / ALK PHOS: 95 U/L / ALT: 34 U/L / AST: 52 U/L / GGT: x             ABG - ( 22 Dec 2022 00:00 )  pH, Arterial: 7.45  pH, Blood: x     /  pCO2: 39    /  pO2: 112   / HCO3: 27    / Base Excess: 2.9   /  SaO2: 99.0          ------------------------------------------------------------------------------------------------------------------------------  Assessment:  63 y/o male with PMH of CABG, DM, HTN, HLD presenting as transfer from [place] for c/f STEMI.     Cardiogenic shock s/p VA ECMO decannulated 12/8  Mitral regurgitation s/p Mitral clip x1 12/16/22  Acute respiratory distress/failure s/p Tracheostomy 12/16/22   IABP placed ( IABP dc'ed 12/17)   At high risk for hemodynamic instability  Anthony/Renal failure  Thrombocytopenia  Anemia      Plan:   ***Neuro***  History of SDH  PT ongoing.    ***Cardiovascular***  At high risk for hemodynamic instability and cardiac arrhythmias.  TTE 12/19 showed EF 24% with normal RV function  Beta blocker for heart rate control as BP allows.  Wean Vasopressin as able  PO Amiodarone   SQ Heparin for VTE prophylaxis   ASA /Statin daily    ***Pulmonary***  Respiratory failure s/p Tracheostomy - continue TC trials  Wean ventilator as tolerated.   Deep breathing and coughing exercises.    ***GI***  Protein calorie malnutrition - Tube feeds via nasal feeding tube  Protonix for stress ulcer prophylaxis   Bowel regimen with senna    ***Renal***  Renal failure - renal following, tolerating HD, 2 L removed yesterday  Replete electrolytes as indicated.  Vascular surgery consulted for fistula evaluation.    ***ID***  Off antibiotics  Leukocytosis 14.66 -> 11.82, continue to trend.  Appreciate ID input    ***Endocrine***  Hx of DM2, continue glycemic control w/ ISS and  NPH.    ***Hematology***  Acute blood loss anemia and thrombocytopenia - no transfusion indication currently        Patient requires continuous monitoring with bedside rhythm monitoring, pulse oximetry monitoring, and continuous central venous and arterial pressure monitoring; and intermittent blood gas analysis. Care plan discussed with the ICU care team.   Patient remained critical, at risk for life threatening decompensation.    I have spent 35 minutes providing critical care management to this patient.    By signing my name below, I, Maria D'Amico, attest that this documentation has been prepared under the direction and in the presence of Jade Rosas MD  Electronically signed: Maria D'Amico, Scribe, 12-22-22 @ 07:35    I, Jade Rosas MD, personally performed the services described in this documentation. all medical record entries made by the scribe were at my direction and in my presence. I have reviewed the chart and agree that the record reflects my personal performance and is accurate and complete  Electronically signed: Jade Rosas MD Patient seen and examined at the bedside.    Remained critically ill on continuous ICU monitoring.      Brief Summary:  63 yo M admitted with cardiogenic shock s/p VA ECMO, decannulated 12/8/22.      24 Hour events:  OOBTC  On/off low dose Vasopressin  Trach collar trials ongoing  HD Yesterday - 2 Liters removed      OBJECTIVE:  Vital Signs Last 24 Hrs  T(C): 36.9 (22 Dec 2022 04:00), Max: 37 (21 Dec 2022 13:55)  T(F): 98.4 (22 Dec 2022 04:00), Max: 98.6 (21 Dec 2022 13:55)  HR: 85 (22 Dec 2022 07:00) (80 - 110)  BP: 90/53 (21 Dec 2022 18:09) (90/53 - 90/53)  BP(mean): 65 (21 Dec 2022 18:09) (65 - 65)  RR: 19 (22 Dec 2022 07:00) (18 - 29)  SpO2: 100% (22 Dec 2022 07:00) (96% - 100%)    Parameters below as of 22 Dec 2022 04:00  Patient On (Oxygen Delivery Method): tracheostomy collar    O2 Concentration (%): 40    Mode: AC/ CMV (Assist Control/ Continuous Mandatory Ventilation)  RR (machine): 12  TV (machine): 500  FiO2: 50  PEEP: 5  ITime: 1  MAP: 10  PIP: 20              Physical Exam:   General: OOB to chair, no acute distress  Neurology: Following commands  Eyes: Bilateral pupils equal and reactive   ENT/Neck: +Trach, Neck supple  Respiratory: Clear bilaterally   CV: S1S2, Sinus   Abdominal: Soft, NT, ND +BS   Extremities: Warm, with edema    -------------------------------------------------------------------------------------------------------------------------------                        8.9    11.82 )-----------( 76       ( 22 Dec 2022 00:13 )             28.5     12-22    135  |  99  |  40<H>  ----------------------------<  179<H>  4.5   |  26  |  2.74<H>    Ca    7.8<L>      22 Dec 2022 00:13  Phos  3.7     12-22  Mg     2.4     12-22    TPro  6.4  /  Alb  2.9<L>  /  TBili  0.6  /  DBili  x   /  AST  52<H>  /  ALT  34  /  AlkPhos  95  12-22    LIVER FUNCTIONS - ( 22 Dec 2022 00:13 )  Alb: 2.9 g/dL / Pro: 6.4 g/dL / ALK PHOS: 95 U/L / ALT: 34 U/L / AST: 52 U/L / GGT: x             ABG - ( 22 Dec 2022 00:00 )  pH, Arterial: 7.45  pH, Blood: x     /  pCO2: 39    /  pO2: 112   / HCO3: 27    / Base Excess: 2.9   /  SaO2: 99.0          ------------------------------------------------------------------------------------------------------------------------------  Assessment:  63 y/o male with PMH of CABG, DM, HTN, HLD presenting as transfer from [place] for c/f STEMI.     Cardiogenic shock s/p VA ECMO decannulated 12/8  Mitral regurgitation s/p Mitral clip x1 12/16/22  Acute respiratory distress/failure s/p Tracheostomy 12/16/22   IABP placed ( IABP dc'ed 12/17)   At high risk for hemodynamic instability  Anthony/Renal failure  Thrombocytopenia  Anemia      Plan:   ***Neuro***  History of SDH  PT ongoing.    ***Cardiovascular***  At high risk for hemodynamic instability and cardiac arrhythmias.  TTE 12/19 showed EF 24% with normal RV function  Beta blocker for heart rate control as BP allows.  Wean Vasopressin as able  PO Amiodarone   SQ Heparin for VTE prophylaxis   ASA /Statin daily    ***Pulmonary***  Respiratory failure s/p Tracheostomy - continue TC trials  Wean ventilator as tolerated.   Deep breathing and coughing exercises.    ***GI***  Protein calorie malnutrition - Tube feeds via nasal feeding tube  Protonix for stress ulcer prophylaxis   Bowel regimen with senna    ***Renal***  Renal failure - renal following, tolerating HD, 2 L removed yesterday  Replete electrolytes as indicated.  Vascular surgery consulted for fistula evaluation.    ***ID***  Off antibiotics  Leukocytosis 14.66 -> 11.82, continue to trend.  Appreciate ID input    ***Endocrine***  Hx of DM2, continue glycemic control w/ ISS and  NPH.    ***Hematology***  Acute blood loss anemia and thrombocytopenia - no transfusion indication currently        Patient requires continuous monitoring with bedside rhythm monitoring, pulse oximetry monitoring, and continuous central venous and arterial pressure monitoring; and intermittent blood gas analysis. Care plan discussed with the ICU care team.   Patient remained critical, at risk for life threatening decompensation.    I have spent 35 minutes providing critical care management to this patient.    By signing my name below, I, Maria D'Amico, attest that this documentation has been prepared under the direction and in the presence of Jade Rosas MD  Electronically signed: Maria D'Amico, Scribe, 12-22-22 @ 07:35    I, Jade Rosas MD, personally performed the services described in this documentation. all medical record entries made by the scribe were at my direction and in my presence. I have reviewed the chart and agree that the record reflects my personal performance and is accurate and complete  Electronically signed: Jade Rosas MD

## 2022-12-22 NOTE — PROGRESS NOTE ADULT - ASSESSMENT
61 YO M now S/P #7 Proximal XLT Tracheostomy POD # 6. Minimal dry crusted blood noted on superior stoma, doing well on the ventilator, no bleeding noted.

## 2022-12-22 NOTE — PROGRESS NOTE ADULT - PROBLEM SELECTOR PLAN 1
Pt with non oliguric ERIC/ ATN in the setting of STEMI, cardiac arrest & cardiogenic shock  SCr on arrival was 2.15; trended down to hector 1.6 on 11/25; slowly trended up & increased to 3.95 11/28.   Pt received IV diuretics. SCr increased to 4.19 with BUN of 101 on 12/5/22. Pt with significant volume overload. Pt initiated on CRRT/CVVHDF to optimize volume and electrolytes on 12/5/22. Pt likely with ATN. Pt underwent decannulation of ECMO on 12/8/22 and was taken off CRRT. Pt reinitiated on CRRT overnight on 12/9/22 for volume overload. s/p trach on 12/16. CRRT stopped again 12/19. Bladder scan daily. Last showed 350cc requiring straight cath. No plan for HD today. Monitor labs and urine output. Avoid any potential nephrotoxins. Dose medications as per HD.    #Anemia  Please check TSAT, may benefit from IV Iron if low.     If you have any questions, please feel free to contact me  Arsalan Cochran  Nephrology Fellow  [phone]; Prefer [employer] TEAMS  (After 5pm or on weekends please page the on-call fellow) Pt with non oliguric ERIC/ ATN in the setting of STEMI, cardiac arrest & cardiogenic shock  SCr on arrival was 2.15; trended down to hector 1.6 on 11/25; slowly trended up & increased to 3.95 11/28.   Pt received IV diuretics. SCr increased to 4.19 with BUN of 101 on 12/5/22. Pt with significant volume overload. Pt initiated on CRRT/CVVHDF to optimize volume and electrolytes on 12/5/22. Pt likely with ATN. Pt underwent decannulation of ECMO on 12/8/22 and was taken off CRRT. Pt reinitiated on CRRT overnight on 12/9/22 for volume overload. s/p trach on 12/16. CRRT stopped again 12/19. Bladder scan daily. Last showed 350cc requiring straight cath. No plan for HD today. Monitor labs and urine output. Avoid any potential nephrotoxins. Dose medications as per HD.    #Anemia  Please check TSAT, may benefit from IV Iron if low.     If you have any questions, please feel free to contact me  Arsalan Cochran  Nephrology Fellow  [phone]; Prefer [employer] TEAMS  (After 5pm or on weekends please page the on-call fellow) Pt with non oliguric ERIC/ ATN in the setting of STEMI, cardiac arrest & cardiogenic shock  SCr on arrival was 2.15; trended down to hector 1.6 on 11/25; slowly trended up & increased to 3.95 11/28.   Pt received IV diuretics. SCr increased to 4.19 with BUN of 101 on 12/5/22. Pt with significant volume overload. Pt initiated on CRRT/CVVHDF to optimize volume and electrolytes on 12/5/22. Pt likely with ATN. Pt underwent decannulation of ECMO on 12/8/22 and was taken off CRRT. Pt reinitiated on CRRT overnight on 12/9/22 for volume overload. s/p trach on 12/16. CRRT stopped again 12/19. Bladder scan daily. Last showed 350cc requiring straight cath. No plan for HD today. Monitor labs and urine output. Avoid any potential nephrotoxins. Dose medications as per HD.    #Anemia  Please check TSAT, may benefit from IV Iron if low.     If you have any questions, please feel free to contact me  Arsalan Cochran  Nephrology Fellow  [phone]; Prefer [employer] TEAMS  (After 5pm or on weekends please page the on-call fellow)

## 2022-12-22 NOTE — PROGRESS NOTE ADULT - SUBJECTIVE AND OBJECTIVE BOX
[facility] DIVISION OF KIDNEY DISEASES AND HYPERTENSION -- FOLLOW UP NOTE  --------------------------------------------------------------------------------  Chief Complaint: Acute pancreatitis without infection or necrosis    HPI:  63 y/o male with PMH of CABG, DM, HTN, HLD presenting as transfer from [place] for c/f STEMI. Course c/b gallstone pancreatitis leading to ARDS and shock & cardiac arrest now on [facility]. Had significant troponin elevation and his LVEF down to 8%. Pt was deemed to be too unstable to have an angiogram and potential PCI due to his co-morbidities & a new subdural bleed. Pt being seen for ERIC. SCr on arrival was 2.15; trended down to hector 1.6 on 11/25 and eventually increased to 3.95 11/28. Pt received IV diuretics as per CTU. Pt initiated on CRRT on 12/5/22 to optimize volume status and electrolytes.  Pt underwent decannulation of ECMO on 12/8/22. Pt was restarted on 12/9/22 for volume overload. Pt underwent mitral clip OR (12/16/22).  s/p trach on 12/16     HPI: Pt. seen this AM. Tolerated HD without any complaints, however required Vasopressin for 40min afterwards. WAs given Lopressor before hand. Was started on Vasopressin again this AM after receiving Lopressor again. Straight cath was 350cc.       PAST HISTORY  --------------------------------------------------------------------------------  No significant changes to PMH, PSH, FHx, SHx, unless otherwise noted    ALLERGIES & MEDICATIONS  --------------------------------------------------------------------------------  Allergies    No Known Allergies    Intolerances      Standing Inpatient Medications  aMIOdarone    Tablet 200 milliGRAM(s) Oral daily  aspirin  chewable 81 milliGRAM(s) Oral daily  atorvastatin 40 milliGRAM(s) Oral at bedtime  chlorhexidine 0.12% Liquid 15 milliLiter(s) Oral Mucosa every 12 hours  chlorhexidine 2% Cloths 1 Application(s) Topical <User Schedule>  chlorhexidine 4% Liquid 1 Application(s) Topical <User Schedule>  heparin   Injectable 5000 Unit(s) SubCutaneous every 8 hours  HYDROmorphone  Injectable 0.5 milliGRAM(s) IV Push once  insulin lispro (ADMELOG) corrective regimen sliding scale   SubCutaneous every 6 hours  insulin NPH human recombinant 2 Unit(s) SubCutaneous every 6 hours  metoprolol tartrate 12.5 milliGRAM(s) Oral every 12 hours  multivitamin/minerals/iron Oral Solution (CENTRUM) 15 milliLiter(s) Oral daily  pantoprazole  Injectable 40 milliGRAM(s) IV Push daily  senna 2 Tablet(s) Oral at bedtime  thiamine 100 milliGRAM(s) Enteral Tube daily    PRN Inpatient Medications  acetaminophen    Suspension .. 650 milliGRAM(s) Oral every 6 hours PRN  sodium chloride 0.9% lock flush 10 milliLiter(s) IV Push every 1 hour PRN      REVIEW OF SYSTEMS  --------------------------------------------------------------------------------  Unable to obtain    VITALS/PHYSICAL EXAM  --------------------------------------------------------------------------------  T(C): 36.9 (12-22-22 @ 04:00), Max: 37 (12-21-22 @ 13:55)  HR: 83 (12-22-22 @ 08:00) (81 - 110)  BP: 90/53 (12-21-22 @ 18:09) (90/53 - 90/53)  RR: 17 (12-22-22 @ 08:00) (15 - 29)  SpO2: 100% (12-22-22 @ 08:00) (96% - 100%)  Wt(kg): --        12-21-22 @ 07:01  -  12-22-22 @ 07:00  --------------------------------------------------------  IN: 2470 mL / OUT: 3150 mL / NET: -680 mL      Physical Exam:  	Gen: ill appearing male  	HEENT: Anicteric  	Pulm:  trach+, vented  	CV: S1S2+  	Abd: Soft, +BS       	Ext: + LE edema B/L  	Neuro: Awake  	Skin: Warm and dry             Acces: CARMELO non tunneled HD catheter    LABS/STUDIES  --------------------------------------------------------------------------------              8.9    11.82 >-----------<  76       [12-22-22 @ 00:13]              28.5     135  |  99  |  40  ----------------------------<  179      [12-22-22 @ 00:13]  4.5   |  26  |  2.74        Ca     7.8     [12-22-22 @ 00:13]      Mg     2.4     [12-22-22 @ 00:13]      Phos  3.7     [12-22-22 @ 00:13]    TPro  6.4  /  Alb  2.9  /  TBili  0.6  /  DBili  x   /  AST  52  /  ALT  34  /  AlkPhos  95  [12-22-22 @ 00:13]          Creatinine Trend:  SCr 2.74 [12-22 @ 00:13]  SCr 2.37 [12-21 @ 00:38]  SCr 2.25 [12-20 @ 00:52]  SCr 1.40 [12-19 @ 00:34]  SCr 1.75 [12-18 @ 02:16]    Urinalysis - [12-16-22 @ 04:28]      Color DK BROWN / Appearance Turbid / SG 1.029 / pH 6.0      Gluc Trace / Ketone Negative  / Bili Negative / Urobili 6 mg/dL       Blood Large / Protein 300 mg/dL / Leuk Est Large / Nitrite Negative       /  / Hyaline 85 / Gran  / Sq Epi  / Non Sq Epi 8 / Bacteria Moderate      TSH 2.84      [12-10-22 @ 15:55]  Lipid: chol --, , HDL --, LDL --      [12-05-22 @ 00:50]    HBsAb 889.7      [12-20-22 @ 11:25]  HBcAb Reactive      [12-20-22 @ 11:25]  HCV 0.15, Nonreact      [12-20-22 @ 11:25]     [facility] DIVISION OF KIDNEY DISEASES AND HYPERTENSION -- FOLLOW UP NOTE  --------------------------------------------------------------------------------  Chief Complaint: Acute pancreatitis without infection or necrosis    HPI:  63 y/o male with PMH of CABG, DM, HTN, HLD presenting as transfer from [place] for c/f STEMI. Course c/b gallstone pancreatitis leading to ARDS and shock & cardiac arrest now on [facility]. Had significant troponin elevation and his LVEF down to 8%. Pt was deemed to be too unstable to have an angiogram and potential PCI due to his co-morbidities & a new subdural bleed. Pt being seen for ERIC. SCr on arrival was 2.15; trended down to hector 1.6 on 11/25 and eventually increased to 3.95 11/28. Pt received IV diuretics as per CTU. Pt initiated on CRRT on 12/5/22 to optimize volume status and electrolytes.  Pt underwent decannulation of ECMO on 12/8/22. Pt was restarted on 12/9/22 for volume overload. Pt underwent mitral clip OR (12/16/22).  s/p trach on 12/16     HPI: Pt. seen this AM. Tolerated HD without any complaints, however required Vasopressin for 40min afterwards. WAs given Lopressor before hand. Was started on Vasopressin again this AM after receiving Lopressor again. Straight cath was 350cc.       PAST HISTORY  --------------------------------------------------------------------------------  No significant changes to PMH, PSH, FHx, SHx, unless otherwise noted    ALLERGIES & MEDICATIONS  --------------------------------------------------------------------------------  Allergies    No Known Allergies    Intolerances      Standing Inpatient Medications  aMIOdarone    Tablet 200 milliGRAM(s) Oral daily  aspirin  chewable 81 milliGRAM(s) Oral daily  atorvastatin 40 milliGRAM(s) Oral at bedtime  chlorhexidine 0.12% Liquid 15 milliLiter(s) Oral Mucosa every 12 hours  chlorhexidine 2% Cloths 1 Application(s) Topical <User Schedule>  chlorhexidine 4% Liquid 1 Application(s) Topical <User Schedule>  heparin   Injectable 5000 Unit(s) SubCutaneous every 8 hours  HYDROmorphone  Injectable 0.5 milliGRAM(s) IV Push once  insulin lispro (ADMELOG) corrective regimen sliding scale   SubCutaneous every 6 hours  insulin NPH human recombinant 2 Unit(s) SubCutaneous every 6 hours  metoprolol tartrate 12.5 milliGRAM(s) Oral every 12 hours  multivitamin/minerals/iron Oral Solution (CENTRUM) 15 milliLiter(s) Oral daily  pantoprazole  Injectable 40 milliGRAM(s) IV Push daily  senna 2 Tablet(s) Oral at bedtime  thiamine 100 milliGRAM(s) Enteral Tube daily    PRN Inpatient Medications  acetaminophen    Suspension .. 650 milliGRAM(s) Oral every 6 hours PRN  sodium chloride 0.9% lock flush 10 milliLiter(s) IV Push every 1 hour PRN      REVIEW OF SYSTEMS  --------------------------------------------------------------------------------  Unable to obtain    VITALS/PHYSICAL EXAM  --------------------------------------------------------------------------------  T(C): 36.9 (12-22-22 @ 04:00), Max: 37 (12-21-22 @ 13:55)  HR: 83 (12-22-22 @ 08:00) (81 - 110)  BP: 90/53 (12-21-22 @ 18:09) (90/53 - 90/53)  RR: 17 (12-22-22 @ 08:00) (15 - 29)  SpO2: 100% (12-22-22 @ 08:00) (96% - 100%)  Wt(kg): --        12-21-22 @ 07:01  -  12-22-22 @ 07:00  --------------------------------------------------------  IN: 2470 mL / OUT: 3150 mL / NET: -680 mL      Physical Exam:  	Gen: ill appearing male  	HEENT: Anicteric  	Pulm:  trach+, vented  	CV: S1S2+  	Abd: Soft, +BS       	Ext: + LE edema B/L  	Neuro: Awake  	Skin: Warm and dry             Acces: CARMELO non tunneled HD catheter    LABS/STUDIES  --------------------------------------------------------------------------------              8.9    11.82 >-----------<  76       [12-22-22 @ 00:13]              28.5     135  |  99  |  40  ----------------------------<  179      [12-22-22 @ 00:13]  4.5   |  26  |  2.74        Ca     7.8     [12-22-22 @ 00:13]      Mg     2.4     [12-22-22 @ 00:13]      Phos  3.7     [12-22-22 @ 00:13]    TPro  6.4  /  Alb  2.9  /  TBili  0.6  /  DBili  x   /  AST  52  /  ALT  34  /  AlkPhos  95  [12-22-22 @ 00:13]          Creatinine Trend:  SCr 2.74 [12-22 @ 00:13]  SCr 2.37 [12-21 @ 00:38]  SCr 2.25 [12-20 @ 00:52]  SCr 1.40 [12-19 @ 00:34]  SCr 1.75 [12-18 @ 02:16]    Urinalysis - [12-16-22 @ 04:28]      Color DK BROWN / Appearance Turbid / SG 1.029 / pH 6.0      Gluc Trace / Ketone Negative  / Bili Negative / Urobili 6 mg/dL       Blood Large / Protein 300 mg/dL / Leuk Est Large / Nitrite Negative       /  / Hyaline 85 / Gran  / Sq Epi  / Non Sq Epi 8 / Bacteria Moderate      TSH 2.84      [12-10-22 @ 15:55]  Lipid: chol --, , HDL --, LDL --      [12-05-22 @ 00:50]    HBsAb 889.7      [12-20-22 @ 11:25]  HBcAb Reactive      [12-20-22 @ 11:25]  HCV 0.15, Nonreact      [12-20-22 @ 11:25]     [facility] DIVISION OF KIDNEY DISEASES AND HYPERTENSION -- FOLLOW UP NOTE  --------------------------------------------------------------------------------  Chief Complaint: Acute pancreatitis without infection or necrosis    HPI:  63 y/o male with PMH of CABG, DM, HTN, HLD presenting as transfer from [place] for c/f STEMI. Course c/b gallstone pancreatitis leading to ARDS and shock & cardiac arrest now on [facility]. Had significant troponin elevation and his LVEF down to 8%. Pt was deemed to be too unstable to have an angiogram and potential PCI due to his co-morbidities & a new subdural bleed. Pt being seen for ERIC. SCr on arrival was 2.15; trended down to hector 1.6 on 11/25 and eventually increased to 3.95 11/28. Pt received IV diuretics as per CTU. Pt initiated on CRRT on 12/5/22 to optimize volume status and electrolytes.  Pt underwent decannulation of ECMO on 12/8/22. Pt was restarted on 12/9/22 for volume overload. Pt underwent mitral clip OR (12/16/22).  s/p trach on 12/16     HPI: Pt. seen this AM. Tolerated HD without any complaints, however required Vasopressin for 40min afterwards. WAs given Lopressor before hand. Was started on Vasopressin again this AM after receiving Lopressor again. Straight cath was 350cc.       PAST HISTORY  --------------------------------------------------------------------------------  No significant changes to PMH, PSH, FHx, SHx, unless otherwise noted    ALLERGIES & MEDICATIONS  --------------------------------------------------------------------------------  Allergies    No Known Allergies    Intolerances      Standing Inpatient Medications  aMIOdarone    Tablet 200 milliGRAM(s) Oral daily  aspirin  chewable 81 milliGRAM(s) Oral daily  atorvastatin 40 milliGRAM(s) Oral at bedtime  chlorhexidine 0.12% Liquid 15 milliLiter(s) Oral Mucosa every 12 hours  chlorhexidine 2% Cloths 1 Application(s) Topical <User Schedule>  chlorhexidine 4% Liquid 1 Application(s) Topical <User Schedule>  heparin   Injectable 5000 Unit(s) SubCutaneous every 8 hours  HYDROmorphone  Injectable 0.5 milliGRAM(s) IV Push once  insulin lispro (ADMELOG) corrective regimen sliding scale   SubCutaneous every 6 hours  insulin NPH human recombinant 2 Unit(s) SubCutaneous every 6 hours  metoprolol tartrate 12.5 milliGRAM(s) Oral every 12 hours  multivitamin/minerals/iron Oral Solution (CENTRUM) 15 milliLiter(s) Oral daily  pantoprazole  Injectable 40 milliGRAM(s) IV Push daily  senna 2 Tablet(s) Oral at bedtime  thiamine 100 milliGRAM(s) Enteral Tube daily    PRN Inpatient Medications  acetaminophen    Suspension .. 650 milliGRAM(s) Oral every 6 hours PRN  sodium chloride 0.9% lock flush 10 milliLiter(s) IV Push every 1 hour PRN      REVIEW OF SYSTEMS  --------------------------------------------------------------------------------  Unable to obtain    VITALS/PHYSICAL EXAM  --------------------------------------------------------------------------------  T(C): 36.9 (12-22-22 @ 04:00), Max: 37 (12-21-22 @ 13:55)  HR: 83 (12-22-22 @ 08:00) (81 - 110)  BP: 90/53 (12-21-22 @ 18:09) (90/53 - 90/53)  RR: 17 (12-22-22 @ 08:00) (15 - 29)  SpO2: 100% (12-22-22 @ 08:00) (96% - 100%)  Wt(kg): --        12-21-22 @ 07:01  -  12-22-22 @ 07:00  --------------------------------------------------------  IN: 2470 mL / OUT: 3150 mL / NET: -680 mL      Physical Exam:  	Gen: ill appearing male  	HEENT: Anicteric  	Pulm:  trach+, vented  	CV: S1S2+  	Abd: Soft, +BS       	Ext: + LE edema B/L  	Neuro: Awake  	Skin: Warm and dry             Acces: CARMELO non tunneled HD catheter    LABS/STUDIES  --------------------------------------------------------------------------------              8.9    11.82 >-----------<  76       [12-22-22 @ 00:13]              28.5     135  |  99  |  40  ----------------------------<  179      [12-22-22 @ 00:13]  4.5   |  26  |  2.74        Ca     7.8     [12-22-22 @ 00:13]      Mg     2.4     [12-22-22 @ 00:13]      Phos  3.7     [12-22-22 @ 00:13]    TPro  6.4  /  Alb  2.9  /  TBili  0.6  /  DBili  x   /  AST  52  /  ALT  34  /  AlkPhos  95  [12-22-22 @ 00:13]          Creatinine Trend:  SCr 2.74 [12-22 @ 00:13]  SCr 2.37 [12-21 @ 00:38]  SCr 2.25 [12-20 @ 00:52]  SCr 1.40 [12-19 @ 00:34]  SCr 1.75 [12-18 @ 02:16]    Urinalysis - [12-16-22 @ 04:28]      Color DK BROWN / Appearance Turbid / SG 1.029 / pH 6.0      Gluc Trace / Ketone Negative  / Bili Negative / Urobili 6 mg/dL       Blood Large / Protein 300 mg/dL / Leuk Est Large / Nitrite Negative       /  / Hyaline 85 / Gran  / Sq Epi  / Non Sq Epi 8 / Bacteria Moderate      TSH 2.84      [12-10-22 @ 15:55]  Lipid: chol --, , HDL --, LDL --      [12-05-22 @ 00:50]    HBsAb 889.7      [12-20-22 @ 11:25]  HBcAb Reactive      [12-20-22 @ 11:25]  HCV 0.15, Nonreact      [12-20-22 @ 11:25]

## 2022-12-22 NOTE — PROGRESS NOTE ADULT - SUBJECTIVE AND OBJECTIVE BOX
ENT ISSUE/POD: s/p #7 Proximal XLT tracheostomy POD#6    HPI: 61 YO M with PMH of CABG, DM, HTN, HLD presenting as transfer from [place] for STEMI. Course c/b gallstone pancreatitis leading to ARDS, shock, cardiac arrest then placed on [facility] ECMO. Pt underwent ECMO decannulation. ENT called to evaluate for tracheostomy.  Now S/P #7 Proximal XLT Tracheostomy POD # 6.  Doing well on the ventilator. No bleeding noted.       PAST MEDICAL & SURGICAL HISTORY:    Allergies    No Known Allergies    Intolerances      MEDICATIONS  (STANDING):  aMIOdarone    Tablet 200 milliGRAM(s) Oral daily  aspirin  chewable 81 milliGRAM(s) Oral daily  atorvastatin 40 milliGRAM(s) Oral at bedtime  chlorhexidine 0.12% Liquid 15 milliLiter(s) Oral Mucosa every 12 hours  chlorhexidine 2% Cloths 1 Application(s) Topical <User Schedule>  chlorhexidine 4% Liquid 1 Application(s) Topical <User Schedule>  heparin   Injectable 5000 Unit(s) SubCutaneous every 8 hours  HYDROmorphone  Injectable 0.5 milliGRAM(s) IV Push once  insulin lispro (ADMELOG) corrective regimen sliding scale   SubCutaneous every 6 hours  insulin NPH human recombinant 2 Unit(s) SubCutaneous every 6 hours  metoprolol tartrate 12.5 milliGRAM(s) Oral every 12 hours  multivitamin/minerals/iron Oral Solution (CENTRUM) 15 milliLiter(s) Oral daily  pantoprazole  Injectable 40 milliGRAM(s) IV Push daily  senna 2 Tablet(s) Oral at bedtime  thiamine 100 milliGRAM(s) Enteral Tube daily    MEDICATIONS  (PRN):  acetaminophen    Suspension .. 650 milliGRAM(s) Oral every 6 hours PRN Temp greater or equal to 38.5C (101.3F)  sodium chloride 0.9% lock flush 10 milliLiter(s) IV Push every 1 hour PRN Pre/post blood products, medications, blood draw, and to maintain line patency      social history: see consult     family history: see consult     ROS:   ENT: all negative except as noted in HPI   Pulm: denies SOB, cough, hemoptysis  Neuro: denies numbness/tingling, loss of sensation  Endo: denies heat/cold intolerance, excessive sweating      Vital Signs Last 24 Hrs  T(C): 36.9 (22 Dec 2022 04:00), Max: 37 (21 Dec 2022 13:55)  T(F): 98.4 (22 Dec 2022 04:00), Max: 98.6 (21 Dec 2022 13:55)  HR: 82 (22 Dec 2022 07:30) (82 - 110)  BP: 90/53 (21 Dec 2022 18:09) (90/53 - 90/53)  BP(mean): 65 (21 Dec 2022 18:09) (65 - 65)  RR: 16 (22 Dec 2022 07:30) (16 - 29)  SpO2: 100% (22 Dec 2022 07:30) (96% - 100%)    Parameters below as of 22 Dec 2022 04:00  Patient On (Oxygen Delivery Method): tracheostomy collar    O2 Concentration (%): 40                          8.9    11.82 )-----------( 76       ( 22 Dec 2022 00:13 )             28.5    12-22    135  |  99  |  40<H>  ----------------------------<  179<H>  4.5   |  26  |  2.74<H>    Ca    7.8<L>      22 Dec 2022 00:13  Phos  3.7     12-22  Mg     2.4     12-22    TPro  6.4  /  Alb  2.9<L>  /  TBili  0.6  /  DBili  x   /  AST  52<H>  /  ALT  34  /  AlkPhos  95  12-22     PHYSICAL EXAM:  Gen: NAD  Skin: No rashes, bruises, or lesions  Head: Normocephalic, Atraumatic  Face: no edema, erythema, or fluctuance. Parotid glands soft without mass  Eyes: no scleral injection  Nose: Nares bilaterally patent, no discharge  Mouth: No Stridor / Drooling / Trismus.  Mucosa moist, tongue/uvula midline, oropharynx clear  Neck:  #7 Proximal XLT Trach secured with sutures x 4 and umbilical tie. Dry, crusted blood noted around superior stoma. No hematoma or crepitus.  Flat, supple, no lymphadenopathy, trachea midline, no masses  Lymphatic: No lymphadenopathy  Resp: on vent           ENT ISSUE/POD: s/p #7 Proximal XLT tracheostomy POD#6    HPI: 63 YO M with PMH of CABG, DM, HTN, HLD presenting as transfer from [place] for STEMI. Course c/b gallstone pancreatitis leading to ARDS, shock, cardiac arrest then placed on [facility] ECMO. Pt underwent ECMO decannulation. ENT called to evaluate for tracheostomy.  Now S/P #7 Proximal XLT Tracheostomy POD # 6.  Doing well on the ventilator. No bleeding noted.       PAST MEDICAL & SURGICAL HISTORY:    Allergies    No Known Allergies    Intolerances      MEDICATIONS  (STANDING):  aMIOdarone    Tablet 200 milliGRAM(s) Oral daily  aspirin  chewable 81 milliGRAM(s) Oral daily  atorvastatin 40 milliGRAM(s) Oral at bedtime  chlorhexidine 0.12% Liquid 15 milliLiter(s) Oral Mucosa every 12 hours  chlorhexidine 2% Cloths 1 Application(s) Topical <User Schedule>  chlorhexidine 4% Liquid 1 Application(s) Topical <User Schedule>  heparin   Injectable 5000 Unit(s) SubCutaneous every 8 hours  HYDROmorphone  Injectable 0.5 milliGRAM(s) IV Push once  insulin lispro (ADMELOG) corrective regimen sliding scale   SubCutaneous every 6 hours  insulin NPH human recombinant 2 Unit(s) SubCutaneous every 6 hours  metoprolol tartrate 12.5 milliGRAM(s) Oral every 12 hours  multivitamin/minerals/iron Oral Solution (CENTRUM) 15 milliLiter(s) Oral daily  pantoprazole  Injectable 40 milliGRAM(s) IV Push daily  senna 2 Tablet(s) Oral at bedtime  thiamine 100 milliGRAM(s) Enteral Tube daily    MEDICATIONS  (PRN):  acetaminophen    Suspension .. 650 milliGRAM(s) Oral every 6 hours PRN Temp greater or equal to 38.5C (101.3F)  sodium chloride 0.9% lock flush 10 milliLiter(s) IV Push every 1 hour PRN Pre/post blood products, medications, blood draw, and to maintain line patency      social history: see consult     family history: see consult     ROS:   ENT: all negative except as noted in HPI   Pulm: denies SOB, cough, hemoptysis  Neuro: denies numbness/tingling, loss of sensation  Endo: denies heat/cold intolerance, excessive sweating      Vital Signs Last 24 Hrs  T(C): 36.9 (22 Dec 2022 04:00), Max: 37 (21 Dec 2022 13:55)  T(F): 98.4 (22 Dec 2022 04:00), Max: 98.6 (21 Dec 2022 13:55)  HR: 82 (22 Dec 2022 07:30) (82 - 110)  BP: 90/53 (21 Dec 2022 18:09) (90/53 - 90/53)  BP(mean): 65 (21 Dec 2022 18:09) (65 - 65)  RR: 16 (22 Dec 2022 07:30) (16 - 29)  SpO2: 100% (22 Dec 2022 07:30) (96% - 100%)    Parameters below as of 22 Dec 2022 04:00  Patient On (Oxygen Delivery Method): tracheostomy collar    O2 Concentration (%): 40                          8.9    11.82 )-----------( 76       ( 22 Dec 2022 00:13 )             28.5    12-22    135  |  99  |  40<H>  ----------------------------<  179<H>  4.5   |  26  |  2.74<H>    Ca    7.8<L>      22 Dec 2022 00:13  Phos  3.7     12-22  Mg     2.4     12-22    TPro  6.4  /  Alb  2.9<L>  /  TBili  0.6  /  DBili  x   /  AST  52<H>  /  ALT  34  /  AlkPhos  95  12-22     PHYSICAL EXAM:  Gen: NAD  Skin: No rashes, bruises, or lesions  Head: Normocephalic, Atraumatic  Face: no edema, erythema, or fluctuance. Parotid glands soft without mass  Eyes: no scleral injection  Nose: Nares bilaterally patent, no discharge  Mouth: No Stridor / Drooling / Trismus.  Mucosa moist, tongue/uvula midline, oropharynx clear  Neck:  #7 Proximal XLT Trach secured with sutures x 4 and umbilical tie. Dry, crusted blood noted around superior stoma. No hematoma or crepitus.  Flat, supple, no lymphadenopathy, trachea midline, no masses  Lymphatic: No lymphadenopathy  Resp: on vent           ENT ISSUE/POD: s/p #7 Proximal XLT tracheostomy POD#6    HPI: 61 YO M with PMH of CABG, DM, HTN, HLD presenting as transfer from [place] for STEMI. Course c/b gallstone pancreatitis leading to ARDS, shock, cardiac arrest then placed on [facility] ECMO. Pt underwent ECMO decannulation. ENT called to evaluate for tracheostomy.  Now S/P #7 Proximal XLT Tracheostomy POD # 6.  Doing well on the ventilator. No bleeding noted.       PAST MEDICAL & SURGICAL HISTORY:    Allergies    No Known Allergies    Intolerances      MEDICATIONS  (STANDING):  aMIOdarone    Tablet 200 milliGRAM(s) Oral daily  aspirin  chewable 81 milliGRAM(s) Oral daily  atorvastatin 40 milliGRAM(s) Oral at bedtime  chlorhexidine 0.12% Liquid 15 milliLiter(s) Oral Mucosa every 12 hours  chlorhexidine 2% Cloths 1 Application(s) Topical <User Schedule>  chlorhexidine 4% Liquid 1 Application(s) Topical <User Schedule>  heparin   Injectable 5000 Unit(s) SubCutaneous every 8 hours  HYDROmorphone  Injectable 0.5 milliGRAM(s) IV Push once  insulin lispro (ADMELOG) corrective regimen sliding scale   SubCutaneous every 6 hours  insulin NPH human recombinant 2 Unit(s) SubCutaneous every 6 hours  metoprolol tartrate 12.5 milliGRAM(s) Oral every 12 hours  multivitamin/minerals/iron Oral Solution (CENTRUM) 15 milliLiter(s) Oral daily  pantoprazole  Injectable 40 milliGRAM(s) IV Push daily  senna 2 Tablet(s) Oral at bedtime  thiamine 100 milliGRAM(s) Enteral Tube daily    MEDICATIONS  (PRN):  acetaminophen    Suspension .. 650 milliGRAM(s) Oral every 6 hours PRN Temp greater or equal to 38.5C (101.3F)  sodium chloride 0.9% lock flush 10 milliLiter(s) IV Push every 1 hour PRN Pre/post blood products, medications, blood draw, and to maintain line patency      social history: see consult     family history: see consult     ROS:   ENT: all negative except as noted in HPI   Pulm: denies SOB, cough, hemoptysis  Neuro: denies numbness/tingling, loss of sensation  Endo: denies heat/cold intolerance, excessive sweating      Vital Signs Last 24 Hrs  T(C): 36.9 (22 Dec 2022 04:00), Max: 37 (21 Dec 2022 13:55)  T(F): 98.4 (22 Dec 2022 04:00), Max: 98.6 (21 Dec 2022 13:55)  HR: 82 (22 Dec 2022 07:30) (82 - 110)  BP: 90/53 (21 Dec 2022 18:09) (90/53 - 90/53)  BP(mean): 65 (21 Dec 2022 18:09) (65 - 65)  RR: 16 (22 Dec 2022 07:30) (16 - 29)  SpO2: 100% (22 Dec 2022 07:30) (96% - 100%)    Parameters below as of 22 Dec 2022 04:00  Patient On (Oxygen Delivery Method): tracheostomy collar    O2 Concentration (%): 40                          8.9    11.82 )-----------( 76       ( 22 Dec 2022 00:13 )             28.5    12-22    135  |  99  |  40<H>  ----------------------------<  179<H>  4.5   |  26  |  2.74<H>    Ca    7.8<L>      22 Dec 2022 00:13  Phos  3.7     12-22  Mg     2.4     12-22    TPro  6.4  /  Alb  2.9<L>  /  TBili  0.6  /  DBili  x   /  AST  52<H>  /  ALT  34  /  AlkPhos  95  12-22     PHYSICAL EXAM:  Gen: NAD  Skin: No rashes, bruises, or lesions  Head: Normocephalic, Atraumatic  Face: no edema, erythema, or fluctuance. Parotid glands soft without mass  Eyes: no scleral injection  Nose: Nares bilaterally patent, no discharge  Mouth: No Stridor / Drooling / Trismus.  Mucosa moist, tongue/uvula midline, oropharynx clear  Neck:  #7 Proximal XLT Trach secured with sutures x 4 and umbilical tie. Dry, crusted blood noted around superior stoma. No hematoma or crepitus.  Flat, supple, no lymphadenopathy, trachea midline, no masses  Lymphatic: No lymphadenopathy  Resp: on vent

## 2022-12-23 LAB
ALBUMIN SERPL ELPH-MCNC: 3.3 G/DL — SIGNIFICANT CHANGE UP (ref 3.3–5)
ALP SERPL-CCNC: 98 U/L — SIGNIFICANT CHANGE UP (ref 40–120)
ALT FLD-CCNC: 33 U/L — SIGNIFICANT CHANGE UP (ref 10–45)
ANION GAP SERPL CALC-SCNC: 16 MMOL/L — SIGNIFICANT CHANGE UP (ref 5–17)
AST SERPL-CCNC: 40 U/L — SIGNIFICANT CHANGE UP (ref 10–40)
BILIRUB SERPL-MCNC: 0.6 MG/DL — SIGNIFICANT CHANGE UP (ref 0.2–1.2)
BUN SERPL-MCNC: 70 MG/DL — HIGH (ref 7–23)
CALCIUM SERPL-MCNC: 7.8 MG/DL — LOW (ref 8.4–10.5)
CHLORIDE SERPL-SCNC: 96 MMOL/L — SIGNIFICANT CHANGE UP (ref 96–108)
CO2 SERPL-SCNC: 22 MMOL/L — SIGNIFICANT CHANGE UP (ref 22–31)
CREAT SERPL-MCNC: 4.21 MG/DL — HIGH (ref 0.5–1.3)
CULTURE RESULTS: SIGNIFICANT CHANGE UP
EGFR: 15 ML/MIN/1.73M2 — LOW
GAS PNL BLDA: SIGNIFICANT CHANGE UP
GLUCOSE BLDC GLUCOMTR-MCNC: 113 MG/DL — HIGH (ref 70–99)
GLUCOSE BLDC GLUCOMTR-MCNC: 154 MG/DL — HIGH (ref 70–99)
GLUCOSE BLDC GLUCOMTR-MCNC: 166 MG/DL — HIGH (ref 70–99)
GLUCOSE BLDC GLUCOMTR-MCNC: 175 MG/DL — HIGH (ref 70–99)
GLUCOSE BLDC GLUCOMTR-MCNC: 236 MG/DL — HIGH (ref 70–99)
GLUCOSE SERPL-MCNC: 239 MG/DL — HIGH (ref 70–99)
HCT VFR BLD CALC: 28.3 % — LOW (ref 39–50)
HGB BLD-MCNC: 8.7 G/DL — LOW (ref 13–17)
MAGNESIUM SERPL-MCNC: 2.8 MG/DL — HIGH (ref 1.6–2.6)
MCHC RBC-ENTMCNC: 28.8 PG — SIGNIFICANT CHANGE UP (ref 27–34)
MCHC RBC-ENTMCNC: 30.7 GM/DL — LOW (ref 32–36)
MCV RBC AUTO: 93.7 FL — SIGNIFICANT CHANGE UP (ref 80–100)
NRBC # BLD: 0 /100 WBCS — SIGNIFICANT CHANGE UP (ref 0–0)
PHOSPHATE SERPL-MCNC: 5.1 MG/DL — HIGH (ref 2.5–4.5)
PLATELET # BLD AUTO: 122 K/UL — LOW (ref 150–400)
POTASSIUM BLDA-SCNC: 5.3 MMOL/L — HIGH (ref 3.5–5.1)
POTASSIUM BLDA-SCNC: 5.5 MMOL/L — HIGH (ref 3.5–5.1)
POTASSIUM SERPL-MCNC: 5.9 MMOL/L — HIGH (ref 3.5–5.3)
POTASSIUM SERPL-SCNC: 5.9 MMOL/L — HIGH (ref 3.5–5.3)
PROT SERPL-MCNC: 6.8 G/DL — SIGNIFICANT CHANGE UP (ref 6–8.3)
RBC # BLD: 3.02 M/UL — LOW (ref 4.2–5.8)
RBC # FLD: 18.8 % — HIGH (ref 10.3–14.5)
SODIUM SERPL-SCNC: 134 MMOL/L — LOW (ref 135–145)
SPECIMEN SOURCE: SIGNIFICANT CHANGE UP
WBC # BLD: 13.24 K/UL — HIGH (ref 3.8–10.5)
WBC # FLD AUTO: 13.24 K/UL — HIGH (ref 3.8–10.5)

## 2022-12-23 PROCEDURE — 71045 X-RAY EXAM CHEST 1 VIEW: CPT | Mod: 26

## 2022-12-23 PROCEDURE — 99231 SBSQ HOSP IP/OBS SF/LOW 25: CPT

## 2022-12-23 PROCEDURE — 99291 CRITICAL CARE FIRST HOUR: CPT

## 2022-12-23 PROCEDURE — 99233 SBSQ HOSP IP/OBS HIGH 50: CPT | Mod: GC

## 2022-12-23 PROCEDURE — 71045 X-RAY EXAM CHEST 1 VIEW: CPT | Mod: 26,77

## 2022-12-23 RX ORDER — CHLORHEXIDINE GLUCONATE 213 G/1000ML
15 SOLUTION TOPICAL EVERY 12 HOURS
Refills: 0 | Status: DISCONTINUED | OUTPATIENT
Start: 2022-12-23 | End: 2023-01-07

## 2022-12-23 RX ORDER — INSULIN HUMAN 100 [IU]/ML
5 INJECTION, SOLUTION SUBCUTANEOUS ONCE
Refills: 0 | Status: COMPLETED | OUTPATIENT
Start: 2022-12-23 | End: 2022-12-23

## 2022-12-23 RX ORDER — DEXTROSE 50 % IN WATER 50 %
50 SYRINGE (ML) INTRAVENOUS ONCE
Refills: 0 | Status: COMPLETED | OUTPATIENT
Start: 2022-12-23 | End: 2022-12-23

## 2022-12-23 RX ORDER — SODIUM ZIRCONIUM CYCLOSILICATE 10 G/10G
10 POWDER, FOR SUSPENSION ORAL ONCE
Refills: 0 | Status: COMPLETED | OUTPATIENT
Start: 2022-12-23 | End: 2022-12-23

## 2022-12-23 RX ORDER — FUROSEMIDE 40 MG
80 TABLET ORAL ONCE
Refills: 0 | Status: COMPLETED | OUTPATIENT
Start: 2022-12-23 | End: 2022-12-23

## 2022-12-23 RX ORDER — INSULIN HUMAN 100 [IU]/ML
10 INJECTION, SOLUTION SUBCUTANEOUS ONCE
Refills: 0 | Status: COMPLETED | OUTPATIENT
Start: 2022-12-23 | End: 2022-12-23

## 2022-12-23 RX ORDER — ATORVASTATIN CALCIUM 80 MG/1
80 TABLET, FILM COATED ORAL AT BEDTIME
Refills: 0 | Status: DISCONTINUED | OUTPATIENT
Start: 2022-12-23 | End: 2023-01-20

## 2022-12-23 RX ORDER — CALCIUM GLUCONATE 100 MG/ML
2 VIAL (ML) INTRAVENOUS ONCE
Refills: 0 | Status: COMPLETED | OUTPATIENT
Start: 2022-12-23 | End: 2022-12-23

## 2022-12-23 RX ORDER — HUMAN INSULIN 100 [IU]/ML
7 INJECTION, SUSPENSION SUBCUTANEOUS EVERY 6 HOURS
Refills: 0 | Status: DISCONTINUED | OUTPATIENT
Start: 2022-12-23 | End: 2022-12-26

## 2022-12-23 RX ADMIN — HEPARIN SODIUM 5000 UNIT(S): 5000 INJECTION INTRAVENOUS; SUBCUTANEOUS at 01:15

## 2022-12-23 RX ADMIN — HUMAN INSULIN 7 UNIT(S): 100 INJECTION, SUSPENSION SUBCUTANEOUS at 01:16

## 2022-12-23 RX ADMIN — CHLORHEXIDINE GLUCONATE 1 APPLICATION(S): 213 SOLUTION TOPICAL at 05:21

## 2022-12-23 RX ADMIN — CHLORHEXIDINE GLUCONATE 15 MILLILITER(S): 213 SOLUTION TOPICAL at 17:27

## 2022-12-23 RX ADMIN — AMIODARONE HYDROCHLORIDE 200 MILLIGRAM(S): 400 TABLET ORAL at 05:06

## 2022-12-23 RX ADMIN — HUMAN INSULIN 7 UNIT(S): 100 INJECTION, SUSPENSION SUBCUTANEOUS at 17:40

## 2022-12-23 RX ADMIN — HEPARIN SODIUM 5000 UNIT(S): 5000 INJECTION INTRAVENOUS; SUBCUTANEOUS at 17:27

## 2022-12-23 RX ADMIN — SODIUM ZIRCONIUM CYCLOSILICATE 10 GRAM(S): 10 POWDER, FOR SUSPENSION ORAL at 01:15

## 2022-12-23 RX ADMIN — ATORVASTATIN CALCIUM 80 MILLIGRAM(S): 80 TABLET, FILM COATED ORAL at 21:46

## 2022-12-23 RX ADMIN — Medication 50 MILLILITER(S): at 05:49

## 2022-12-23 RX ADMIN — VASOPRESSIN 4.5 UNIT(S)/MIN: 20 INJECTION INTRAVENOUS at 18:00

## 2022-12-23 RX ADMIN — HEPARIN SODIUM 5000 UNIT(S): 5000 INJECTION INTRAVENOUS; SUBCUTANEOUS at 09:18

## 2022-12-23 RX ADMIN — SENNA PLUS 2 TABLET(S): 8.6 TABLET ORAL at 21:46

## 2022-12-23 RX ADMIN — Medication 2: at 12:34

## 2022-12-23 RX ADMIN — Medication 200 GRAM(S): at 01:17

## 2022-12-23 RX ADMIN — INSULIN HUMAN 5 UNIT(S): 100 INJECTION, SOLUTION SUBCUTANEOUS at 05:50

## 2022-12-23 RX ADMIN — Medication 2: at 05:07

## 2022-12-23 RX ADMIN — PANTOPRAZOLE SODIUM 40 MILLIGRAM(S): 20 TABLET, DELAYED RELEASE ORAL at 12:33

## 2022-12-23 RX ADMIN — CHLORHEXIDINE GLUCONATE 15 MILLILITER(S): 213 SOLUTION TOPICAL at 05:06

## 2022-12-23 RX ADMIN — Medication 80 MILLIGRAM(S): at 03:44

## 2022-12-23 RX ADMIN — HUMAN INSULIN 7 UNIT(S): 100 INJECTION, SUSPENSION SUBCUTANEOUS at 12:34

## 2022-12-23 RX ADMIN — INSULIN HUMAN 10 UNIT(S): 100 INJECTION, SOLUTION SUBCUTANEOUS at 15:02

## 2022-12-23 RX ADMIN — Medication 50 MILLILITER(S): at 15:03

## 2022-12-23 RX ADMIN — HUMAN INSULIN 7 UNIT(S): 100 INJECTION, SUSPENSION SUBCUTANEOUS at 05:06

## 2022-12-23 RX ADMIN — Medication 4: at 01:16

## 2022-12-23 NOTE — PROGRESS NOTE ADULT - SUBJECTIVE AND OBJECTIVE BOX
[facility] DIVISION OF KIDNEY DISEASES AND HYPERTENSION -- FOLLOW UP NOTE  --------------------------------------------------------------------------------  Chief Complaint: Acute pancreatitis without infection or necrosis    HPI:  61 y/o male with PMH of CABG, DM, HTN, HLD presenting as transfer from [place] for c/f STEMI. Course c/b gallstone pancreatitis leading to ARDS and shock & cardiac arrest now on [facility]. Had significant troponin elevation and his LVEF down to 8%. Pt was deemed to be too unstable to have an angiogram and potential PCI due to his co-morbidities & a new subdural bleed. Pt being seen for ERIC. SCr on arrival was 2.15; trended down to hector 1.6 on 11/25 and eventually increased to 3.95 11/28. Pt received IV diuretics as per CTU. Pt initiated on CRRT on 12/5/22 to optimize volume status and electrolytes.  Pt underwent decannulation of ECMO on 12/8/22. Pt was restarted on 12/9/22 for volume overload. Pt underwent mitral clip OR (12/16/22).  s/p trach on 12/16     HPI: Pt. seen this AM. Given IV Lasix 80mg yesterday but was found to be retaining and De Dios was placed at 4 AM today. Pt. had 700 cc UOP. High potassium noted so Pt. was given Insulin, Lokelma and Gluconate. SC.r increased dramatically. Pt. states breathing is worse.     PAST HISTORY  --------------------------------------------------------------------------------  No significant changes to PMH, PSH, FHx, SHx, unless otherwise noted    ALLERGIES & MEDICATIONS  --------------------------------------------------------------------------------  Allergies    No Known Allergies    Intolerances      Standing Inpatient Medications  aMIOdarone    Tablet 200 milliGRAM(s) Oral daily  aspirin  chewable 81 milliGRAM(s) Oral daily  atorvastatin 40 milliGRAM(s) Oral at bedtime  chlorhexidine 0.12% Liquid 15 milliLiter(s) Oral Mucosa every 12 hours  chlorhexidine 2% Cloths 1 Application(s) Topical <User Schedule>  chlorhexidine 4% Liquid 1 Application(s) Topical <User Schedule>  heparin   Injectable 5000 Unit(s) SubCutaneous every 8 hours  insulin lispro (ADMELOG) corrective regimen sliding scale   SubCutaneous every 6 hours  insulin NPH human recombinant 7 Unit(s) SubCutaneous every 6 hours  metoprolol tartrate 12.5 milliGRAM(s) Oral every 12 hours  multivitamin/minerals/iron Oral Solution (CENTRUM) 15 milliLiter(s) Oral daily  pantoprazole  Injectable 40 milliGRAM(s) IV Push daily  senna 2 Tablet(s) Oral at bedtime  thiamine 100 milliGRAM(s) Enteral Tube daily  vasopressin Infusion 0.03 Unit(s)/Min IV Continuous <Continuous>    PRN Inpatient Medications  acetaminophen    Suspension .. 650 milliGRAM(s) Oral every 6 hours PRN  sodium chloride 0.9% lock flush 10 milliLiter(s) IV Push every 1 hour PRN      REVIEW OF SYSTEMS  --------------------------------------------------------------------------------  Unable to obtain    VITALS/PHYSICAL EXAM  --------------------------------------------------------------------------------  T(C): 36.9 (12-23-22 @ 04:00), Max: 36.9 (12-22-22 @ 20:00)  HR: 105 (12-23-22 @ 07:00) (84 - 114)  BP: --  RR: 22 (12-23-22 @ 07:00) (18 - 36)  SpO2: 99% (12-23-22 @ 07:00) (93% - 100%)  Wt(kg): --        12-22-22 @ 07:01  -  12-23-22 @ 07:00  --------------------------------------------------------  IN: 1970 mL / OUT: 490 mL / NET: 1480 mL      Physical Exam:  	Gen: ill appearing male  	HEENT: Anicteric  	Pulm:  trach+  	CV: S1S2+  	Abd: Soft, +BS       	Ext: + LE edema B/L  	Neuro: Awake  	Skin: Warm and dry             Acces: CARMELO non tunneled HD catheter      LABS/STUDIES  --------------------------------------------------------------------------------              8.7    13.24 >-----------<  122      [12-23-22 @ 01:22]              28.3     134  |  96  |  70  ----------------------------<  239      [12-23-22 @ 01:22]  5.9   |  22  |  4.21        Ca     7.8     [12-23-22 @ 01:22]      Mg     2.8     [12-23-22 @ 01:22]      Phos  5.1     [12-23-22 @ 01:22]    TPro  6.8  /  Alb  3.3  /  TBili  0.6  /  DBili  x   /  AST  40  /  ALT  33  /  AlkPhos  98  [12-23-22 @ 01:22]      Creatinine Trend:  SCr 4.21 [12-23 @ 01:22]  SCr 2.74 [12-22 @ 00:13]  SCr 2.37 [12-21 @ 00:38]  SCr 2.25 [12-20 @ 00:52]  SCr 1.40 [12-19 @ 00:34]    Urinalysis - [12-16-22 @ 04:28]      Color DK BROWN / Appearance Turbid / SG 1.029 / pH 6.0      Gluc Trace / Ketone Negative  / Bili Negative / Urobili 6 mg/dL       Blood Large / Protein 300 mg/dL / Leuk Est Large / Nitrite Negative       /  / Hyaline 85 / Gran  / Sq Epi  / Non Sq Epi 8 / Bacteria Moderate      TSH 2.84      [12-10-22 @ 15:55]  Lipid: chol --, , HDL --, LDL --      [12-05-22 @ 00:50]    HBsAb 889.7      [12-20-22 @ 11:25]  HBcAb Reactive      [12-20-22 @ 11:25]  HCV 0.15, Nonreact      [12-20-22 @ 11:25]     [facility] DIVISION OF KIDNEY DISEASES AND HYPERTENSION -- FOLLOW UP NOTE  --------------------------------------------------------------------------------  Chief Complaint: Acute pancreatitis without infection or necrosis    HPI:  63 y/o male with PMH of CABG, DM, HTN, HLD presenting as transfer from [place] for c/f STEMI. Course c/b gallstone pancreatitis leading to ARDS and shock & cardiac arrest now on [facility]. Had significant troponin elevation and his LVEF down to 8%. Pt was deemed to be too unstable to have an angiogram and potential PCI due to his co-morbidities & a new subdural bleed. Pt being seen for ERIC. SCr on arrival was 2.15; trended down to hector 1.6 on 11/25 and eventually increased to 3.95 11/28. Pt received IV diuretics as per CTU. Pt initiated on CRRT on 12/5/22 to optimize volume status and electrolytes.  Pt underwent decannulation of ECMO on 12/8/22. Pt was restarted on 12/9/22 for volume overload. Pt underwent mitral clip OR (12/16/22).  s/p trach on 12/16     HPI: Pt. seen this AM. Given IV Lasix 80mg yesterday but was found to be retaining and De Dios was placed at 4 AM today. Pt. had 700 cc UOP. High potassium noted so Pt. was given Insulin, Lokelma and Gluconate. SC.r increased dramatically. Pt. states breathing is worse.     PAST HISTORY  --------------------------------------------------------------------------------  No significant changes to PMH, PSH, FHx, SHx, unless otherwise noted    ALLERGIES & MEDICATIONS  --------------------------------------------------------------------------------  Allergies    No Known Allergies    Intolerances      Standing Inpatient Medications  aMIOdarone    Tablet 200 milliGRAM(s) Oral daily  aspirin  chewable 81 milliGRAM(s) Oral daily  atorvastatin 40 milliGRAM(s) Oral at bedtime  chlorhexidine 0.12% Liquid 15 milliLiter(s) Oral Mucosa every 12 hours  chlorhexidine 2% Cloths 1 Application(s) Topical <User Schedule>  chlorhexidine 4% Liquid 1 Application(s) Topical <User Schedule>  heparin   Injectable 5000 Unit(s) SubCutaneous every 8 hours  insulin lispro (ADMELOG) corrective regimen sliding scale   SubCutaneous every 6 hours  insulin NPH human recombinant 7 Unit(s) SubCutaneous every 6 hours  metoprolol tartrate 12.5 milliGRAM(s) Oral every 12 hours  multivitamin/minerals/iron Oral Solution (CENTRUM) 15 milliLiter(s) Oral daily  pantoprazole  Injectable 40 milliGRAM(s) IV Push daily  senna 2 Tablet(s) Oral at bedtime  thiamine 100 milliGRAM(s) Enteral Tube daily  vasopressin Infusion 0.03 Unit(s)/Min IV Continuous <Continuous>    PRN Inpatient Medications  acetaminophen    Suspension .. 650 milliGRAM(s) Oral every 6 hours PRN  sodium chloride 0.9% lock flush 10 milliLiter(s) IV Push every 1 hour PRN      REVIEW OF SYSTEMS  --------------------------------------------------------------------------------  Unable to obtain    VITALS/PHYSICAL EXAM  --------------------------------------------------------------------------------  T(C): 36.9 (12-23-22 @ 04:00), Max: 36.9 (12-22-22 @ 20:00)  HR: 105 (12-23-22 @ 07:00) (84 - 114)  BP: --  RR: 22 (12-23-22 @ 07:00) (18 - 36)  SpO2: 99% (12-23-22 @ 07:00) (93% - 100%)  Wt(kg): --        12-22-22 @ 07:01  -  12-23-22 @ 07:00  --------------------------------------------------------  IN: 1970 mL / OUT: 490 mL / NET: 1480 mL      Physical Exam:  	Gen: ill appearing male  	HEENT: Anicteric  	Pulm:  trach+  	CV: S1S2+  	Abd: Soft, +BS       	Ext: + LE edema B/L  	Neuro: Awake  	Skin: Warm and dry             Acces: CARMELO non tunneled HD catheter      LABS/STUDIES  --------------------------------------------------------------------------------              8.7    13.24 >-----------<  122      [12-23-22 @ 01:22]              28.3     134  |  96  |  70  ----------------------------<  239      [12-23-22 @ 01:22]  5.9   |  22  |  4.21        Ca     7.8     [12-23-22 @ 01:22]      Mg     2.8     [12-23-22 @ 01:22]      Phos  5.1     [12-23-22 @ 01:22]    TPro  6.8  /  Alb  3.3  /  TBili  0.6  /  DBili  x   /  AST  40  /  ALT  33  /  AlkPhos  98  [12-23-22 @ 01:22]      Creatinine Trend:  SCr 4.21 [12-23 @ 01:22]  SCr 2.74 [12-22 @ 00:13]  SCr 2.37 [12-21 @ 00:38]  SCr 2.25 [12-20 @ 00:52]  SCr 1.40 [12-19 @ 00:34]    Urinalysis - [12-16-22 @ 04:28]      Color DK BROWN / Appearance Turbid / SG 1.029 / pH 6.0      Gluc Trace / Ketone Negative  / Bili Negative / Urobili 6 mg/dL       Blood Large / Protein 300 mg/dL / Leuk Est Large / Nitrite Negative       /  / Hyaline 85 / Gran  / Sq Epi  / Non Sq Epi 8 / Bacteria Moderate      TSH 2.84      [12-10-22 @ 15:55]  Lipid: chol --, , HDL --, LDL --      [12-05-22 @ 00:50]    HBsAb 889.7      [12-20-22 @ 11:25]  HBcAb Reactive      [12-20-22 @ 11:25]  HCV 0.15, Nonreact      [12-20-22 @ 11:25]     [facility] DIVISION OF KIDNEY DISEASES AND HYPERTENSION -- FOLLOW UP NOTE  --------------------------------------------------------------------------------  Chief Complaint: Acute pancreatitis without infection or necrosis    HPI:  61 y/o male with PMH of CABG, DM, HTN, HLD presenting as transfer from [place] for c/f STEMI. Course c/b gallstone pancreatitis leading to ARDS and shock & cardiac arrest now on [facility]. Had significant troponin elevation and his LVEF down to 8%. Pt was deemed to be too unstable to have an angiogram and potential PCI due to his co-morbidities & a new subdural bleed. Pt being seen for ERIC. SCr on arrival was 2.15; trended down to hector 1.6 on 11/25 and eventually increased to 3.95 11/28. Pt received IV diuretics as per CTU. Pt initiated on CRRT on 12/5/22 to optimize volume status and electrolytes.  Pt underwent decannulation of ECMO on 12/8/22. Pt was restarted on 12/9/22 for volume overload. Pt underwent mitral clip OR (12/16/22).  s/p trach on 12/16     HPI: Pt. seen this AM. Given IV Lasix 80mg yesterday but was found to be retaining and De Dios was placed at 4 AM today. Pt. had 700 cc UOP. High potassium noted so Pt. was given Insulin, Lokelma and Gluconate. SC.r increased dramatically. Pt. states breathing is worse.     PAST HISTORY  --------------------------------------------------------------------------------  No significant changes to PMH, PSH, FHx, SHx, unless otherwise noted    ALLERGIES & MEDICATIONS  --------------------------------------------------------------------------------  Allergies    No Known Allergies    Intolerances      Standing Inpatient Medications  aMIOdarone    Tablet 200 milliGRAM(s) Oral daily  aspirin  chewable 81 milliGRAM(s) Oral daily  atorvastatin 40 milliGRAM(s) Oral at bedtime  chlorhexidine 0.12% Liquid 15 milliLiter(s) Oral Mucosa every 12 hours  chlorhexidine 2% Cloths 1 Application(s) Topical <User Schedule>  chlorhexidine 4% Liquid 1 Application(s) Topical <User Schedule>  heparin   Injectable 5000 Unit(s) SubCutaneous every 8 hours  insulin lispro (ADMELOG) corrective regimen sliding scale   SubCutaneous every 6 hours  insulin NPH human recombinant 7 Unit(s) SubCutaneous every 6 hours  metoprolol tartrate 12.5 milliGRAM(s) Oral every 12 hours  multivitamin/minerals/iron Oral Solution (CENTRUM) 15 milliLiter(s) Oral daily  pantoprazole  Injectable 40 milliGRAM(s) IV Push daily  senna 2 Tablet(s) Oral at bedtime  thiamine 100 milliGRAM(s) Enteral Tube daily  vasopressin Infusion 0.03 Unit(s)/Min IV Continuous <Continuous>    PRN Inpatient Medications  acetaminophen    Suspension .. 650 milliGRAM(s) Oral every 6 hours PRN  sodium chloride 0.9% lock flush 10 milliLiter(s) IV Push every 1 hour PRN      REVIEW OF SYSTEMS  --------------------------------------------------------------------------------  Unable to obtain    VITALS/PHYSICAL EXAM  --------------------------------------------------------------------------------  T(C): 36.9 (12-23-22 @ 04:00), Max: 36.9 (12-22-22 @ 20:00)  HR: 105 (12-23-22 @ 07:00) (84 - 114)  BP: --  RR: 22 (12-23-22 @ 07:00) (18 - 36)  SpO2: 99% (12-23-22 @ 07:00) (93% - 100%)  Wt(kg): --        12-22-22 @ 07:01  -  12-23-22 @ 07:00  --------------------------------------------------------  IN: 1970 mL / OUT: 490 mL / NET: 1480 mL      Physical Exam:  	Gen: ill appearing male  	HEENT: Anicteric  	Pulm:  trach+  	CV: S1S2+  	Abd: Soft, +BS       	Ext: + LE edema B/L  	Neuro: Awake  	Skin: Warm and dry             Acces: CARMELO non tunneled HD catheter      LABS/STUDIES  --------------------------------------------------------------------------------              8.7    13.24 >-----------<  122      [12-23-22 @ 01:22]              28.3     134  |  96  |  70  ----------------------------<  239      [12-23-22 @ 01:22]  5.9   |  22  |  4.21        Ca     7.8     [12-23-22 @ 01:22]      Mg     2.8     [12-23-22 @ 01:22]      Phos  5.1     [12-23-22 @ 01:22]    TPro  6.8  /  Alb  3.3  /  TBili  0.6  /  DBili  x   /  AST  40  /  ALT  33  /  AlkPhos  98  [12-23-22 @ 01:22]      Creatinine Trend:  SCr 4.21 [12-23 @ 01:22]  SCr 2.74 [12-22 @ 00:13]  SCr 2.37 [12-21 @ 00:38]  SCr 2.25 [12-20 @ 00:52]  SCr 1.40 [12-19 @ 00:34]    Urinalysis - [12-16-22 @ 04:28]      Color DK BROWN / Appearance Turbid / SG 1.029 / pH 6.0      Gluc Trace / Ketone Negative  / Bili Negative / Urobili 6 mg/dL       Blood Large / Protein 300 mg/dL / Leuk Est Large / Nitrite Negative       /  / Hyaline 85 / Gran  / Sq Epi  / Non Sq Epi 8 / Bacteria Moderate      TSH 2.84      [12-10-22 @ 15:55]  Lipid: chol --, , HDL --, LDL --      [12-05-22 @ 00:50]    HBsAb 889.7      [12-20-22 @ 11:25]  HBcAb Reactive      [12-20-22 @ 11:25]  HCV 0.15, Nonreact      [12-20-22 @ 11:25]     [facility] DIVISION OF KIDNEY DISEASES AND HYPERTENSION -- FOLLOW UP NOTE  --------------------------------------------------------------------------------  Chief Complaint: Acute pancreatitis without infection or necrosis    HPI:  63 y/o male with PMH of CABG, DM, HTN, HLD presenting as transfer from [place] for c/f STEMI. Course c/b gallstone pancreatitis leading to ARDS and shock & cardiac arrest now on [facility] ECM[facility]. Had significant troponin elevation and his LVEF down to 8%. Pt was deemed to be too unstable to have an angiogram and potential PCI due to his co-morbidities & a new subdural bleed. Pt being seen for ERIC. SCr on arrival was 2.15; trended down to hector 1.6 on 11/25 and eventually increased to 3.95 11/28. Pt received IV diuretics as per CTU. Pt initiated on CRRT on 12/5/22 to optimize volume status and electrolytes.  Pt underwent decannulation of ECMO on 12/8/22. Pt was restarted on 12/9/22 for volume overload. Pt underwent mitral clip OR (12/16/22).  s/p trach on 12/16     HPI: Pt. seen this AM. Given IV Lasix 80mg yesterday but was found to be retaining and De Dios was placed at 4 AM today. Pt. had 490 cc UOP. High potassium noted so Pt. was given Insulin, Lokelma and Gluconate. SC.r increased dramatically. Pt. states breathing is worse.     PAST HISTORY  --------------------------------------------------------------------------------  No significant changes to PMH, PSH, FHx, SHx, unless otherwise noted    ALLERGIES & MEDICATIONS  --------------------------------------------------------------------------------  Allergies    No Known Allergies    Intolerances      Standing Inpatient Medications  aMIOdarone    Tablet 200 milliGRAM(s) Oral daily  aspirin  chewable 81 milliGRAM(s) Oral daily  atorvastatin 40 milliGRAM(s) Oral at bedtime  chlorhexidine 0.12% Liquid 15 milliLiter(s) Oral Mucosa every 12 hours  chlorhexidine 2% Cloths 1 Application(s) Topical <User Schedule>  chlorhexidine 4% Liquid 1 Application(s) Topical <User Schedule>  heparin   Injectable 5000 Unit(s) SubCutaneous every 8 hours  insulin lispro (ADMELOG) corrective regimen sliding scale   SubCutaneous every 6 hours  insulin NPH human recombinant 7 Unit(s) SubCutaneous every 6 hours  metoprolol tartrate 12.5 milliGRAM(s) Oral every 12 hours  multivitamin/minerals/iron Oral Solution (CENTRUM) 15 milliLiter(s) Oral daily  pantoprazole  Injectable 40 milliGRAM(s) IV Push daily  senna 2 Tablet(s) Oral at bedtime  thiamine 100 milliGRAM(s) Enteral Tube daily  vasopressin Infusion 0.03 Unit(s)/Min IV Continuous <Continuous>    PRN Inpatient Medications  acetaminophen    Suspension .. 650 milliGRAM(s) Oral every 6 hours PRN  sodium chloride 0.9% lock flush 10 milliLiter(s) IV Push every 1 hour PRN      REVIEW OF SYSTEMS  --------------------------------------------------------------------------------  Unable to obtain    VITALS/PHYSICAL EXAM  --------------------------------------------------------------------------------  T(C): 36.9 (12-23-22 @ 04:00), Max: 36.9 (12-22-22 @ 20:00)  HR: 105 (12-23-22 @ 07:00) (84 - 114)  BP: --  RR: 22 (12-23-22 @ 07:00) (18 - 36)  SpO2: 99% (12-23-22 @ 07:00) (93% - 100%)  Wt(kg): --        12-22-22 @ 07:01  -  12-23-22 @ 07:00  --------------------------------------------------------  IN: 1970 mL / OUT: 490 mL / NET: 1480 mL      Physical Exam:  	Gen: ill appearing male  	HEENT: Anicteric  	Pulm:  trach+  	CV: S1S2+  	Abd: Soft, +BS       	Ext: + LE edema B/L  	Neuro: Awake  	Skin: Warm and dry             Acces: CARMELO non tunneled HD catheter      LABS/STUDIES  --------------------------------------------------------------------------------              8.7    13.24 >-----------<  122      [12-23-22 @ 01:22]              28.3     134  |  96  |  70  ----------------------------<  239      [12-23-22 @ 01:22]  5.9   |  22  |  4.21        Ca     7.8     [12-23-22 @ 01:22]      Mg     2.8     [12-23-22 @ 01:22]      Phos  5.1     [12-23-22 @ 01:22]    TPro  6.8  /  Alb  3.3  /  TBili  0.6  /  DBili  x   /  AST  40  /  ALT  33  /  AlkPhos  98  [12-23-22 @ 01:22]      Creatinine Trend:  SCr 4.21 [12-23 @ 01:22]  SCr 2.74 [12-22 @ 00:13]  SCr 2.37 [12-21 @ 00:38]  SCr 2.25 [12-20 @ 00:52]  SCr 1.40 [12-19 @ 00:34]    Urinalysis - [12-16-22 @ 04:28]      Color DK BROWN / Appearance Turbid / SG 1.029 / pH 6.0      Gluc Trace / Ketone Negative  / Bili Negative / Urobili 6 mg/dL       Blood Large / Protein 300 mg/dL / Leuk Est Large / Nitrite Negative       /  / Hyaline 85 / Gran  / Sq Epi  / Non Sq Epi 8 / Bacteria Moderate      TSH 2.84      [12-10-22 @ 15:55]  Lipid: chol --, , HDL --, LDL --      [12-05-22 @ 00:50]    HBsAb 889.7      [12-20-22 @ 11:25]  HBcAb Reactive      [12-20-22 @ 11:25]  HCV 0.15, Nonreact      [12-20-22 @ 11:25]     [facility] DIVISION OF KIDNEY DISEASES AND HYPERTENSION -- FOLLOW UP NOTE  --------------------------------------------------------------------------------  Chief Complaint: Acute pancreatitis without infection or necrosis    HPI:  61 y/o male with PMH of CABG, DM, HTN, HLD presenting as transfer from [place] for c/f STEMI. Course c/b gallstone pancreatitis leading to ARDS and shock & cardiac arrest now on [facility] ECMO. Had significant troponin elevation and his LVEF down to 8%. Pt was deemed to be too unstable to have an angiogram and potential PCI due to his co-morbidities & a new subdural bleed. Pt being seen for ERIC. SCr on arrival was 2.15; trended down to hector 1.6 on 11/25 and eventually increased to 3.95 11/28. Pt received IV diuretics as per CTU. Pt initiated on CRRT on 12/5/22 to optimize volume status and electrolytes.  Pt underwent decannulation of ECMO on 12/8/22. Pt was restarted on 12/9/22 for volume overload. Pt underwent mitral clip OR (12/16/22).  s/p trach on 12/16     HPI: Pt. seen this AM. Given IV Lasix 80mg yesterday but was found to be retaining and De Dios was placed at 4 AM today. Pt. had 490 cc UOP. High potassium noted so Pt. was given Insulin, Lokelma and Gluconate. SC.r increased dramatically. Pt. states breathing is worse.     PAST HISTORY  --------------------------------------------------------------------------------  No significant changes to PMH, PSH, FHx, SHx, unless otherwise noted    ALLERGIES & MEDICATIONS  --------------------------------------------------------------------------------  Allergies    No Known Allergies    Intolerances      Standing Inpatient Medications  aMIOdarone    Tablet 200 milliGRAM(s) Oral daily  aspirin  chewable 81 milliGRAM(s) Oral daily  atorvastatin 40 milliGRAM(s) Oral at bedtime  chlorhexidine 0.12% Liquid 15 milliLiter(s) Oral Mucosa every 12 hours  chlorhexidine 2% Cloths 1 Application(s) Topical <User Schedule>  chlorhexidine 4% Liquid 1 Application(s) Topical <User Schedule>  heparin   Injectable 5000 Unit(s) SubCutaneous every 8 hours  insulin lispro (ADMELOG) corrective regimen sliding scale   SubCutaneous every 6 hours  insulin NPH human recombinant 7 Unit(s) SubCutaneous every 6 hours  metoprolol tartrate 12.5 milliGRAM(s) Oral every 12 hours  multivitamin/minerals/iron Oral Solution (CENTRUM) 15 milliLiter(s) Oral daily  pantoprazole  Injectable 40 milliGRAM(s) IV Push daily  senna 2 Tablet(s) Oral at bedtime  thiamine 100 milliGRAM(s) Enteral Tube daily  vasopressin Infusion 0.03 Unit(s)/Min IV Continuous <Continuous>    PRN Inpatient Medications  acetaminophen    Suspension .. 650 milliGRAM(s) Oral every 6 hours PRN  sodium chloride 0.9% lock flush 10 milliLiter(s) IV Push every 1 hour PRN      REVIEW OF SYSTEMS  --------------------------------------------------------------------------------  Unable to obtain    VITALS/PHYSICAL EXAM  --------------------------------------------------------------------------------  T(C): 36.9 (12-23-22 @ 04:00), Max: 36.9 (12-22-22 @ 20:00)  HR: 105 (12-23-22 @ 07:00) (84 - 114)  BP: --  RR: 22 (12-23-22 @ 07:00) (18 - 36)  SpO2: 99% (12-23-22 @ 07:00) (93% - 100%)  Wt(kg): --        12-22-22 @ 07:01  -  12-23-22 @ 07:00  --------------------------------------------------------  IN: 1970 mL / OUT: 490 mL / NET: 1480 mL      Physical Exam:  	Gen: ill appearing male  	HEENT: Anicteric  	Pulm:  trach+  	CV: S1S2+  	Abd: Soft, +BS       	Ext: + LE edema B/L  	Neuro: Awake  	Skin: Warm and dry             Acces: CARMELO non tunneled HD catheter      LABS/STUDIES  --------------------------------------------------------------------------------              8.7    13.24 >-----------<  122      [12-23-22 @ 01:22]              28.3     134  |  96  |  70  ----------------------------<  239      [12-23-22 @ 01:22]  5.9   |  22  |  4.21        Ca     7.8     [12-23-22 @ 01:22]      Mg     2.8     [12-23-22 @ 01:22]      Phos  5.1     [12-23-22 @ 01:22]    TPro  6.8  /  Alb  3.3  /  TBili  0.6  /  DBili  x   /  AST  40  /  ALT  33  /  AlkPhos  98  [12-23-22 @ 01:22]      Creatinine Trend:  SCr 4.21 [12-23 @ 01:22]  SCr 2.74 [12-22 @ 00:13]  SCr 2.37 [12-21 @ 00:38]  SCr 2.25 [12-20 @ 00:52]  SCr 1.40 [12-19 @ 00:34]    Urinalysis - [12-16-22 @ 04:28]      Color DK BROWN / Appearance Turbid / SG 1.029 / pH 6.0      Gluc Trace / Ketone Negative  / Bili Negative / Urobili 6 mg/dL       Blood Large / Protein 300 mg/dL / Leuk Est Large / Nitrite Negative       /  / Hyaline 85 / Gran  / Sq Epi  / Non Sq Epi 8 / Bacteria Moderate      TSH 2.84      [12-10-22 @ 15:55]  Lipid: chol --, , HDL --, LDL --      [12-05-22 @ 00:50]    HBsAb 889.7      [12-20-22 @ 11:25]  HBcAb Reactive      [12-20-22 @ 11:25]  HCV 0.15, Nonreact      [12-20-22 @ 11:25]     [facility] DIVISION OF KIDNEY DISEASES AND HYPERTENSION -- FOLLOW UP NOTE  --------------------------------------------------------------------------------  Chief Complaint: Acute pancreatitis without infection or necrosis    HPI:  63 y/o male with PMH of CABG, DM, HTN, HLD presenting as transfer from [place] for c/f STEMI. Course c/b gallstone pancreatitis leading to ARDS and shock & cardiac arrest now on [facility] ECMO. Had significant troponin elevation and his LVEF down to 8%. Pt was deemed to be too unstable to have an angiogram and potential PCI due to his co-morbidities & a new subdural bleed. Pt being seen for ERIC. SCr on arrival was 2.15; trended down to hector 1.6 on 11/25 and eventually increased to 3.95 11/28. Pt received IV diuretics as per CTU. Pt initiated on CRRT on 12/5/22 to optimize volume status and electrolytes.  Pt underwent decannulation of ECMO on 12/8/22. Pt was restarted on 12/9/22 for volume overload. Pt underwent mitral clip OR (12/16/22).  s/p trach on 12/16     HPI: Pt. seen this AM. Given IV Lasix 80mg yesterday but was found to be retaining and De Dios was placed at 4 AM today. Pt. had 490 cc UOP. High potassium noted so Pt. was given Insulin, Lokelma and Gluconate. SC.r increased dramatically. Pt. states breathing is worse.     PAST HISTORY  --------------------------------------------------------------------------------  No significant changes to PMH, PSH, FHx, SHx, unless otherwise noted    ALLERGIES & MEDICATIONS  --------------------------------------------------------------------------------  Allergies    No Known Allergies    Intolerances      Standing Inpatient Medications  aMIOdarone    Tablet 200 milliGRAM(s) Oral daily  aspirin  chewable 81 milliGRAM(s) Oral daily  atorvastatin 40 milliGRAM(s) Oral at bedtime  chlorhexidine 0.12% Liquid 15 milliLiter(s) Oral Mucosa every 12 hours  chlorhexidine 2% Cloths 1 Application(s) Topical <User Schedule>  chlorhexidine 4% Liquid 1 Application(s) Topical <User Schedule>  heparin   Injectable 5000 Unit(s) SubCutaneous every 8 hours  insulin lispro (ADMELOG) corrective regimen sliding scale   SubCutaneous every 6 hours  insulin NPH human recombinant 7 Unit(s) SubCutaneous every 6 hours  metoprolol tartrate 12.5 milliGRAM(s) Oral every 12 hours  multivitamin/minerals/iron Oral Solution (CENTRUM) 15 milliLiter(s) Oral daily  pantoprazole  Injectable 40 milliGRAM(s) IV Push daily  senna 2 Tablet(s) Oral at bedtime  thiamine 100 milliGRAM(s) Enteral Tube daily  vasopressin Infusion 0.03 Unit(s)/Min IV Continuous <Continuous>    PRN Inpatient Medications  acetaminophen    Suspension .. 650 milliGRAM(s) Oral every 6 hours PRN  sodium chloride 0.9% lock flush 10 milliLiter(s) IV Push every 1 hour PRN      REVIEW OF SYSTEMS  --------------------------------------------------------------------------------  Unable to obtain    VITALS/PHYSICAL EXAM  --------------------------------------------------------------------------------  T(C): 36.9 (12-23-22 @ 04:00), Max: 36.9 (12-22-22 @ 20:00)  HR: 105 (12-23-22 @ 07:00) (84 - 114)  BP: --  RR: 22 (12-23-22 @ 07:00) (18 - 36)  SpO2: 99% (12-23-22 @ 07:00) (93% - 100%)  Wt(kg): --        12-22-22 @ 07:01  -  12-23-22 @ 07:00  --------------------------------------------------------  IN: 1970 mL / OUT: 490 mL / NET: 1480 mL      Physical Exam:  	Gen: ill appearing male  	HEENT: Anicteric  	Pulm:  trach+  	CV: S1S2+  	Abd: Soft, +BS       	Ext: + LE edema B/L  	Neuro: Awake  	Skin: Warm and dry             Acces: CARMELO non tunneled HD catheter      LABS/STUDIES  --------------------------------------------------------------------------------              8.7    13.24 >-----------<  122      [12-23-22 @ 01:22]              28.3     134  |  96  |  70  ----------------------------<  239      [12-23-22 @ 01:22]  5.9   |  22  |  4.21        Ca     7.8     [12-23-22 @ 01:22]      Mg     2.8     [12-23-22 @ 01:22]      Phos  5.1     [12-23-22 @ 01:22]    TPro  6.8  /  Alb  3.3  /  TBili  0.6  /  DBili  x   /  AST  40  /  ALT  33  /  AlkPhos  98  [12-23-22 @ 01:22]      Creatinine Trend:  SCr 4.21 [12-23 @ 01:22]  SCr 2.74 [12-22 @ 00:13]  SCr 2.37 [12-21 @ 00:38]  SCr 2.25 [12-20 @ 00:52]  SCr 1.40 [12-19 @ 00:34]    Urinalysis - [12-16-22 @ 04:28]      Color DK BROWN / Appearance Turbid / SG 1.029 / pH 6.0      Gluc Trace / Ketone Negative  / Bili Negative / Urobili 6 mg/dL       Blood Large / Protein 300 mg/dL / Leuk Est Large / Nitrite Negative       /  / Hyaline 85 / Gran  / Sq Epi  / Non Sq Epi 8 / Bacteria Moderate      TSH 2.84      [12-10-22 @ 15:55]  Lipid: chol --, , HDL --, LDL --      [12-05-22 @ 00:50]    HBsAb 889.7      [12-20-22 @ 11:25]  HBcAb Reactive      [12-20-22 @ 11:25]  HCV 0.15, Nonreact      [12-20-22 @ 11:25]

## 2022-12-23 NOTE — PROGRESS NOTE ADULT - PROBLEM SELECTOR PLAN 1
Pt with non oliguric ERIC/ ATN in the setting of STEMI, cardiac arrest & cardiogenic shock  SCr on arrival was 2.15; trended down to hector 1.6 on 11/25; slowly trended up & increased to 3.95 11/28.   Pt received IV diuretics. SCr increased to 4.19 with BUN of 101 on 12/5/22. Pt with significant volume overload. Pt initiated on CRRT/CVVHDF to optimize volume and electrolytes on 12/5/22. Pt likely with ATN. Pt underwent decannulation of ECMO on 12/8/22 and was taken off CRRT. Pt reinitiated on CRRT overnight on 12/9/22 for volume overload. s/p trach on 12/16. CRRT stopped again 12/19. Bladder scan daily. Now with De Dios. Will discuss need for HD today pending repeat labs. Monitor labs and urine output. Avoid any potential nephrotoxins. Dose medications as per HD.    #Anemia  Please check TSAT, may benefit from IV Iron if low.     If you have any questions, please feel free to contact me  Arsalan Cochran  Nephrology Fellow  [phone]; Prefer [employer] TEAMS  (After 5pm or on weekends please page the on-call fellow) Pt with non oliguric ERIC/ ATN in the setting of STEMI, cardiac arrest & cardiogenic shock  SCr on arrival was 2.15; trended down to hector 1.6 on 11/25; slowly trended up & increased to 3.95 11/28.   Pt received IV diuretics. SCr increased to 4.19 with BUN of 101 on 12/5/22. Pt with significant volume overload. Pt initiated on CRRT/CVVHDF to optimize volume and electrolytes on 12/5/22. Pt likely with ATN. Pt underwent decannulation of ECMO on 12/8/22 and was taken off CRRT. Pt reinitiated on CRRT overnight on 12/9/22 for volume overload. s/p trach on 12/16. CRRT stopped again 12/19. Bladder scan daily. Now with De Dios. Will discuss need for HD today pending repeat labs. Monitor labs and urine output. Avoid any potential nephrotoxins. Dose medications as per HD.    #Anemia  Please check TSAT, may benefit from IV Iron if low.     If you have any questions, please feel free to contact me  Arsalan Cochran  Nephrology Fellow  [phone]; Prefer [employer] TEAMS  (After 5pm or on weekends please page the on-call fellow) Pt with non oliguric ERIC/ ATN in the setting of STEMI, cardiac arrest & cardiogenic shock  SCr on arrival was 2.15; trended down to hector 1.6 on 11/25; slowly trended up & increased to 3.95 11/28.   Pt received IV diuretics. SCr increased to 4.19 with BUN of 101 on 12/5/22. Pt with significant volume overload. Pt initiated on CRRT/CVVHDF to optimize volume and electrolytes on 12/5/22. Pt likely with ATN. Pt underwent decannulation of ECMO on 12/8/22 and was taken off CRRT. Pt reinitiated on CRRT overnight on 12/9/22 for volume overload. s/p trach on 12/16. CRRT stopped again 12/19. Bladder scan daily. Now with De Dios. Will discuss need for HD today pending repeat labs. Monitor labs and urine output. Avoid any potential nephrotoxins. Dose medications as per HD.    #Anemia  Please check TSAT, may benefit from IV Iron if low.     If you have any questions, please feel free to contact me  Arsalan Cochran  Nephrology Fellow  [phone]; Prefer [employer] TEAMS  (After 5pm or on weekends please page the on-call fellow)

## 2022-12-23 NOTE — PROGRESS NOTE ADULT - NS ATTEND AMEND GEN_ALL_CORE FT
trach in place  doing well  c/w routine trach care  umbilical tie to be changed by nursing due to proximity to catheter dressing, sutures removed by ENT

## 2022-12-23 NOTE — PROGRESS NOTE ADULT - ATTENDING COMMENTS
ERIC/ATN  Required CRRT, now transitioned to intermittent hemodialysis  Remains very edematous, increased dyspnea    Hemodialysis today for volume management and clearance  Dose meds for HD/eGFR<10  Maintain MAP >65  Remainder per fellow, will follow

## 2022-12-23 NOTE — PROGRESS NOTE ADULT - SUBJECTIVE AND OBJECTIVE BOX
ENT ISSUE/POD: s/p #7 Proximal XLT tracheostomy POD#7    HPI: 63 YO M with PMH of CABG, DM, HTN, HLD presenting as transfer from [place] for STEMI. Course c/b gallstone pancreatitis leading to ARDS, shock, cardiac arrest then placed on [facility] ECMO. Pt underwent ECMO decannulation. ENT called to evaluate for tracheostomy.  Now S/P #7 Proximal XLT Tracheostomy POD #7.  Doing well on the ventilator. No bleeding noted.         PAST MEDICAL & SURGICAL HISTORY:    Allergies    No Known Allergies    Intolerances      MEDICATIONS  (STANDING):  aMIOdarone    Tablet 200 milliGRAM(s) Oral daily  aspirin  chewable 81 milliGRAM(s) Oral daily  atorvastatin 40 milliGRAM(s) Oral at bedtime  chlorhexidine 0.12% Liquid 15 milliLiter(s) Oral Mucosa every 12 hours  chlorhexidine 2% Cloths 1 Application(s) Topical <User Schedule>  chlorhexidine 4% Liquid 1 Application(s) Topical <User Schedule>  heparin   Injectable 5000 Unit(s) SubCutaneous every 8 hours  insulin lispro (ADMELOG) corrective regimen sliding scale   SubCutaneous every 6 hours  insulin NPH human recombinant 7 Unit(s) SubCutaneous every 6 hours  metoprolol tartrate 12.5 milliGRAM(s) Oral every 12 hours  multivitamin/minerals/iron Oral Solution (CENTRUM) 15 milliLiter(s) Oral daily  pantoprazole  Injectable 40 milliGRAM(s) IV Push daily  senna 2 Tablet(s) Oral at bedtime  thiamine 100 milliGRAM(s) Enteral Tube daily  vasopressin Infusion 0.03 Unit(s)/Min (4.5 mL/Hr) IV Continuous <Continuous>    MEDICATIONS  (PRN):  acetaminophen    Suspension .. 650 milliGRAM(s) Oral every 6 hours PRN Temp greater or equal to 38.5C (101.3F)  sodium chloride 0.9% lock flush 10 milliLiter(s) IV Push every 1 hour PRN Pre/post blood products, medications, blood draw, and to maintain line patency      Social History: see consult    Family history: see consult    ROS:   ENT: all negative except as noted in HPI   Pulm: denies SOB, cough, hemoptysis  Neuro: denies numbness/tingling, loss of sensation  Endo: denies heat/cold intolerance, excessive sweating      Vital Signs Last 24 Hrs  T(C): 36.9 (23 Dec 2022 04:00), Max: 36.9 (22 Dec 2022 20:00)  T(F): 98.4 (23 Dec 2022 04:00), Max: 98.4 (22 Dec 2022 20:00)  HR: 105 (23 Dec 2022 07:00) (87 - 114)  BP: --  BP(mean): --  RR: 22 (23 Dec 2022 07:00) (18 - 36)  SpO2: 99% (23 Dec 2022 07:00) (93% - 100%)    Parameters below as of 23 Dec 2022 04:00  Patient On (Oxygen Delivery Method): ventilator    O2 Concentration (%): 40                          8.7    13.24 )-----------( 122      ( 23 Dec 2022 01:22 )             28.3    12-23    134<L>  |  96  |  70<H>  ----------------------------<  239<H>  5.9<H>   |  22  |  4.21<H>    Ca    7.8<L>      23 Dec 2022 01:22  Phos  5.1     12-23  Mg     2.8     12-23    TPro  6.8  /  Alb  3.3  /  TBili  0.6  /  DBili  x   /  AST  40  /  ALT  33  /  AlkPhos  98  12-23       PHYSICAL EXAM:  Gen: NAD  Skin: No rashes, bruises, or lesions  Head: Normocephalic, Atraumatic  Face: no edema, erythema, or fluctuance. Parotid glands soft without mass  Eyes: no scleral injection  Nose: Nares bilaterally patent, no discharge  Mouth: No Stridor / Drooling / Trismus.  Mucosa moist, tongue/uvula midline, oropharynx clear  Neck: #7 Proximal XLT Trach in place, sutures x 4 and umbilical tie removed, secured with velcro tie. Dry, crusted blood noted around superior stoma. No hematoma or crepitus.  Flat, supple, no lymphadenopathy, trachea midline, no masses  Lymphatic: No lymphadenopathy  Resp: on vent         ENT ISSUE/POD: s/p #7 Proximal XLT tracheostomy POD#7    HPI: 61 YO M with PMH of CABG, DM, HTN, HLD presenting as transfer from [place] for STEMI. Course c/b gallstone pancreatitis leading to ARDS, shock, cardiac arrest then placed on [facility] ECMO. Pt underwent ECMO decannulation. ENT called to evaluate for tracheostomy.  Now S/P #7 Proximal XLT Tracheostomy POD #7.  Doing well on the ventilator. No bleeding noted.         PAST MEDICAL & SURGICAL HISTORY:    Allergies    No Known Allergies    Intolerances      MEDICATIONS  (STANDING):  aMIOdarone    Tablet 200 milliGRAM(s) Oral daily  aspirin  chewable 81 milliGRAM(s) Oral daily  atorvastatin 40 milliGRAM(s) Oral at bedtime  chlorhexidine 0.12% Liquid 15 milliLiter(s) Oral Mucosa every 12 hours  chlorhexidine 2% Cloths 1 Application(s) Topical <User Schedule>  chlorhexidine 4% Liquid 1 Application(s) Topical <User Schedule>  heparin   Injectable 5000 Unit(s) SubCutaneous every 8 hours  insulin lispro (ADMELOG) corrective regimen sliding scale   SubCutaneous every 6 hours  insulin NPH human recombinant 7 Unit(s) SubCutaneous every 6 hours  metoprolol tartrate 12.5 milliGRAM(s) Oral every 12 hours  multivitamin/minerals/iron Oral Solution (CENTRUM) 15 milliLiter(s) Oral daily  pantoprazole  Injectable 40 milliGRAM(s) IV Push daily  senna 2 Tablet(s) Oral at bedtime  thiamine 100 milliGRAM(s) Enteral Tube daily  vasopressin Infusion 0.03 Unit(s)/Min (4.5 mL/Hr) IV Continuous <Continuous>    MEDICATIONS  (PRN):  acetaminophen    Suspension .. 650 milliGRAM(s) Oral every 6 hours PRN Temp greater or equal to 38.5C (101.3F)  sodium chloride 0.9% lock flush 10 milliLiter(s) IV Push every 1 hour PRN Pre/post blood products, medications, blood draw, and to maintain line patency      Social History: see consult    Family history: see consult    ROS:   ENT: all negative except as noted in HPI   Pulm: denies SOB, cough, hemoptysis  Neuro: denies numbness/tingling, loss of sensation  Endo: denies heat/cold intolerance, excessive sweating      Vital Signs Last 24 Hrs  T(C): 36.9 (23 Dec 2022 04:00), Max: 36.9 (22 Dec 2022 20:00)  T(F): 98.4 (23 Dec 2022 04:00), Max: 98.4 (22 Dec 2022 20:00)  HR: 105 (23 Dec 2022 07:00) (87 - 114)  BP: --  BP(mean): --  RR: 22 (23 Dec 2022 07:00) (18 - 36)  SpO2: 99% (23 Dec 2022 07:00) (93% - 100%)    Parameters below as of 23 Dec 2022 04:00  Patient On (Oxygen Delivery Method): ventilator    O2 Concentration (%): 40                          8.7    13.24 )-----------( 122      ( 23 Dec 2022 01:22 )             28.3    12-23    134<L>  |  96  |  70<H>  ----------------------------<  239<H>  5.9<H>   |  22  |  4.21<H>    Ca    7.8<L>      23 Dec 2022 01:22  Phos  5.1     12-23  Mg     2.8     12-23    TPro  6.8  /  Alb  3.3  /  TBili  0.6  /  DBili  x   /  AST  40  /  ALT  33  /  AlkPhos  98  12-23       PHYSICAL EXAM:  Gen: NAD  Skin: No rashes, bruises, or lesions  Head: Normocephalic, Atraumatic  Face: no edema, erythema, or fluctuance. Parotid glands soft without mass  Eyes: no scleral injection  Nose: Nares bilaterally patent, no discharge  Mouth: No Stridor / Drooling / Trismus.  Mucosa moist, tongue/uvula midline, oropharynx clear  Neck: #7 Proximal XLT Trach in place, sutures x 4 and umbilical tie removed, secured with velcro tie. Dry, crusted blood noted around superior stoma. No hematoma or crepitus.  Flat, supple, no lymphadenopathy, trachea midline, no masses  Lymphatic: No lymphadenopathy  Resp: on vent         ENT ISSUE/POD: s/p #7 Proximal XLT tracheostomy POD#7    HPI: 63 YO M with PMH of CABG, DM, HTN, HLD presenting as transfer from [place] for STEMI. Course c/b gallstone pancreatitis leading to ARDS, shock, cardiac arrest then placed on [facility] ECMO. Pt underwent ECMO decannulation. ENT called to evaluate for tracheostomy.  Now S/P #7 Proximal XLT Tracheostomy POD #7.  Doing well on the ventilator. No bleeding noted.         PAST MEDICAL & SURGICAL HISTORY:    Allergies    No Known Allergies    Intolerances      MEDICATIONS  (STANDING):  aMIOdarone    Tablet 200 milliGRAM(s) Oral daily  aspirin  chewable 81 milliGRAM(s) Oral daily  atorvastatin 40 milliGRAM(s) Oral at bedtime  chlorhexidine 0.12% Liquid 15 milliLiter(s) Oral Mucosa every 12 hours  chlorhexidine 2% Cloths 1 Application(s) Topical <User Schedule>  chlorhexidine 4% Liquid 1 Application(s) Topical <User Schedule>  heparin   Injectable 5000 Unit(s) SubCutaneous every 8 hours  insulin lispro (ADMELOG) corrective regimen sliding scale   SubCutaneous every 6 hours  insulin NPH human recombinant 7 Unit(s) SubCutaneous every 6 hours  metoprolol tartrate 12.5 milliGRAM(s) Oral every 12 hours  multivitamin/minerals/iron Oral Solution (CENTRUM) 15 milliLiter(s) Oral daily  pantoprazole  Injectable 40 milliGRAM(s) IV Push daily  senna 2 Tablet(s) Oral at bedtime  thiamine 100 milliGRAM(s) Enteral Tube daily  vasopressin Infusion 0.03 Unit(s)/Min (4.5 mL/Hr) IV Continuous <Continuous>    MEDICATIONS  (PRN):  acetaminophen    Suspension .. 650 milliGRAM(s) Oral every 6 hours PRN Temp greater or equal to 38.5C (101.3F)  sodium chloride 0.9% lock flush 10 milliLiter(s) IV Push every 1 hour PRN Pre/post blood products, medications, blood draw, and to maintain line patency      Social History: see consult    Family history: see consult    ROS:   ENT: all negative except as noted in HPI   Pulm: denies SOB, cough, hemoptysis  Neuro: denies numbness/tingling, loss of sensation  Endo: denies heat/cold intolerance, excessive sweating      Vital Signs Last 24 Hrs  T(C): 36.9 (23 Dec 2022 04:00), Max: 36.9 (22 Dec 2022 20:00)  T(F): 98.4 (23 Dec 2022 04:00), Max: 98.4 (22 Dec 2022 20:00)  HR: 105 (23 Dec 2022 07:00) (87 - 114)  BP: --  BP(mean): --  RR: 22 (23 Dec 2022 07:00) (18 - 36)  SpO2: 99% (23 Dec 2022 07:00) (93% - 100%)    Parameters below as of 23 Dec 2022 04:00  Patient On (Oxygen Delivery Method): ventilator    O2 Concentration (%): 40                          8.7    13.24 )-----------( 122      ( 23 Dec 2022 01:22 )             28.3    12-23    134<L>  |  96  |  70<H>  ----------------------------<  239<H>  5.9<H>   |  22  |  4.21<H>    Ca    7.8<L>      23 Dec 2022 01:22  Phos  5.1     12-23  Mg     2.8     12-23    TPro  6.8  /  Alb  3.3  /  TBili  0.6  /  DBili  x   /  AST  40  /  ALT  33  /  AlkPhos  98  12-23       PHYSICAL EXAM:  Gen: NAD  Skin: No rashes, bruises, or lesions  Head: Normocephalic, Atraumatic  Face: no edema, erythema, or fluctuance. Parotid glands soft without mass  Eyes: no scleral injection  Nose: Nares bilaterally patent, no discharge  Mouth: No Stridor / Drooling / Trismus.  Mucosa moist, tongue/uvula midline, oropharynx clear  Neck: #7 Proximal XLT Trach in place, sutures x 4 and umbilical tie removed, secured with velcro tie. Dry, crusted blood noted around superior stoma. No hematoma or crepitus.  Flat, supple, no lymphadenopathy, trachea midline, no masses  Lymphatic: No lymphadenopathy  Resp: on vent

## 2022-12-23 NOTE — PROGRESS NOTE ADULT - ASSESSMENT
61 YO M now S/P #7 Proximal XLT Tracheostomy POD #7. Minimal dry crusted blood noted on superior stoma, doing well on the ventilator, no bleeding noted. Sutures removed this morning.  63 YO M now S/P #7 Proximal XLT Tracheostomy POD #7. Minimal dry crusted blood noted on superior stoma, doing well on the ventilator, no bleeding noted. Sutures removed this morning.

## 2022-12-23 NOTE — PROGRESS NOTE ADULT - SUBJECTIVE AND OBJECTIVE BOX
Patient seen and examined at the bedside.    Remained critically ill on continuous ICU monitoring.      Brief Summary:  63 yo M admitted with cardiogenic shock s/p VA ECMO, decannulated 12/8/22.      24 Hour events:      OBJECTIVE:  Vital Signs Last 24 Hrs  T(C): 36.9 (23 Dec 2022 04:00), Max: 36.9 (22 Dec 2022 20:00)  T(F): 98.4 (23 Dec 2022 04:00), Max: 98.4 (22 Dec 2022 20:00)  HR: 105 (23 Dec 2022 07:00) (81 - 114)  BP: --  BP(mean): --  RR: 31 (23 Dec 2022 07:00) (15 - 36)  SpO2: 99% (23 Dec 2022 07:00) (93% - 100%)    Parameters below as of 23 Dec 2022 04:00  Patient On (Oxygen Delivery Method): ventilator    O2 Concentration (%): 40    Mode: SIMV (Synchronized Intermittent Mandatory Ventilation)  RR (machine): 12  TV (machine): 500  FiO2: 50  PEEP: 5  PS: 15  ITime: 1  MAP: 10  PC: 20  PIP: 20              Physical Exam:   General: OOB to chair, no acute distress  Neurology: Following commands  Eyes: Bilateral pupils equal and reactive   ENT/Neck: +Trach, Neck supple  Respiratory: Clear bilaterally   CV: S1S2, Sinus   Abdominal: Soft, NT, ND +BS   Extremities: Warm, with edema      -------------------------------------------------------------------------------------------------------------------------------                        8.7    13.24 )-----------( 122      ( 23 Dec 2022 01:22 )             28.3     12-23    134<L>  |  96  |  70<H>  ----------------------------<  239<H>  5.9<H>   |  22  |  4.21<H>    Ca    7.8<L>      23 Dec 2022 01:22  Phos  5.1     12-23  Mg     2.8     12-23    TPro  6.8  /  Alb  3.3  /  TBili  0.6  /  DBili  x   /  AST  40  /  ALT  33  /  AlkPhos  98  12-23    LIVER FUNCTIONS - ( 23 Dec 2022 01:22 )  Alb: 3.3 g/dL / Pro: 6.8 g/dL / ALK PHOS: 98 U/L / ALT: 33 U/L / AST: 40 U/L / GGT: x             ABG - ( 23 Dec 2022 02:30 )  pH, Arterial: 7.43  pH, Blood: x     /  pCO2: 37    /  pO2: 115   / HCO3: 25    / Base Excess: 0.4   /  SaO2: 98.7              ------------------------------------------------------------------------------------------------------------------------------  Assessment:  63 y/o male with PMH of CABG, DM, HTN, HLD presenting as transfer from [place] for c/f STEMI.     Cardiogenic shock s/p VA ECMO decannulated 12/8  Mitral regurgitation s/p Mitral clip x1 12/16/22  Acute respiratory distress/failure s/p Tracheostomy 12/16/22   IABP placed ( IABP dc'ed 12/17)   Leukocytosis  At high risk for hemodynamic instability  Anthony/Renal failure  Thrombocytopenia  Anemia        Plan:   ***Neuro***  History of SDH  PT ongoing.  Pain control with Tylenol     ***Cardiovascular***  At high risk for hemodynamic instability and cardiac arrhythmias.  TTE 12/19 showed EF 24% with normal RV function  Lopressor for heart rate control as BP allows.  Wean Vasopressin as able  PO Amiodarone   ASA /Statin daily        ***Pulmonary***  Respiratory failure s/p Tracheostomy - continue TC trials  Wean ventilator as tolerated.   Deep breathing and coughing exercises.        ***GI***  Protein calorie malnutrition - Tube feeds via nasal feeding tube  Protonix for stress ulcer prophylaxis   Bowel regimen with senna    ***Renal***  Renal failure - renal following, tolerating HD, 2 L removed 12/21  Replete electrolytes as indicated.  Vascular surgery consulted for fistula evaluation.        ***ID***  Off antibiotics  Leukocytosis 14.66 -> 13.24, continue to trend.  Appreciate ID input      ***Endocrine***  Hx of DM2, continue glycemic control w/ ISS and  NPH.      ***Hematology***  Acute blood loss anemia and thrombocytopenia - no transfusion indication currently  SQ Heparin for DVT ppx           Patient requires continuous monitoring with bedside rhythm monitoring, pulse oximetry monitoring, and continuous central venous and arterial pressure monitoring; and intermittent blood gas analysis. Care plan discussed with the ICU care team.   Patient remained critical, at risk for life threatening decompensation.    I have spent 30 minutes providing critical care management to this patient.    By signing my name below, I, Abhijit Ngo, attest that this documentation has been prepared under the direction and in the presence of Jade Rosas MD  Electronically signed: Abhijit Ngo, 12-23-22 @ 07:40    I, Jade Rosas MD, personally performed the services described in this documentation. all medical record entries made by the scribe were at my direction and in my presence. I have reviewed the chart and agree that the record reflects my personal performance and is accurate and complete  Electronically signed: Jade Rosas MD Patient seen and examined at the bedside.    Remained critically ill on continuous ICU monitoring.      Brief Summary:  63 yo M admitted with cardiogenic shock s/p VA ECMO, decannulated 12/8/22.      24 Hour events:      OBJECTIVE:  Vital Signs Last 24 Hrs  T(C): 36.9 (23 Dec 2022 04:00), Max: 36.9 (22 Dec 2022 20:00)  T(F): 98.4 (23 Dec 2022 04:00), Max: 98.4 (22 Dec 2022 20:00)  HR: 105 (23 Dec 2022 07:00) (81 - 114)  BP: --  BP(mean): --  RR: 31 (23 Dec 2022 07:00) (15 - 36)  SpO2: 99% (23 Dec 2022 07:00) (93% - 100%)    Parameters below as of 23 Dec 2022 04:00  Patient On (Oxygen Delivery Method): ventilator    O2 Concentration (%): 40    Mode: SIMV (Synchronized Intermittent Mandatory Ventilation)  RR (machine): 12  TV (machine): 500  FiO2: 50  PEEP: 5  PS: 15  ITime: 1  MAP: 10  PC: 20  PIP: 20              Physical Exam:   General: OOB to chair, no acute distress  Neurology: Following commands  Eyes: Bilateral pupils equal and reactive   ENT/Neck: +Trach, Neck supple  Respiratory: Clear bilaterally   CV: S1S2, Sinus   Abdominal: Soft, NT, ND +BS   Extremities: Warm, with edema      -------------------------------------------------------------------------------------------------------------------------------                        8.7    13.24 )-----------( 122      ( 23 Dec 2022 01:22 )             28.3     12-23    134<L>  |  96  |  70<H>  ----------------------------<  239<H>  5.9<H>   |  22  |  4.21<H>    Ca    7.8<L>      23 Dec 2022 01:22  Phos  5.1     12-23  Mg     2.8     12-23    TPro  6.8  /  Alb  3.3  /  TBili  0.6  /  DBili  x   /  AST  40  /  ALT  33  /  AlkPhos  98  12-23    LIVER FUNCTIONS - ( 23 Dec 2022 01:22 )  Alb: 3.3 g/dL / Pro: 6.8 g/dL / ALK PHOS: 98 U/L / ALT: 33 U/L / AST: 40 U/L / GGT: x             ABG - ( 23 Dec 2022 02:30 )  pH, Arterial: 7.43  pH, Blood: x     /  pCO2: 37    /  pO2: 115   / HCO3: 25    / Base Excess: 0.4   /  SaO2: 98.7              ------------------------------------------------------------------------------------------------------------------------------  Assessment:  63 y/o male with PMH of CABG, DM, HTN, HLD presenting as transfer from [place] for c/f STEMI.     Cardiogenic shock s/p VA ECMO decannulated 12/8  Mitral regurgitation s/p Mitral clip x1 12/16/22  Acute respiratory distress/failure s/p Tracheostomy 12/16/22   IABP placed ( IABP dc'ed 12/17)   Leukocytosis  At high risk for hemodynamic instability  Anthony/Renal failure  Thrombocytopenia  Anemia        Plan:   ***Neuro***  History of SDH  PT ongoing.  Pain control with Tylenol     ***Cardiovascular***  At high risk for hemodynamic instability and cardiac arrhythmias.  TTE 12/19 showed EF 24% with normal RV function  Lopressor for heart rate control as BP allows.  Wean Vasopressin as able  PO Amiodarone   ASA /Statin daily        ***Pulmonary***  Respiratory failure s/p Tracheostomy - continue TC trials  Wean ventilator as tolerated.   Deep breathing and coughing exercises.        ***GI***  Protein calorie malnutrition - Tube feeds via nasal feeding tube  Protonix for stress ulcer prophylaxis   Bowel regimen with senna    ***Renal***  Renal failure - renal following, tolerating HD, 2 L removed 12/21  Replete electrolytes as indicated.  Vascular surgery consulted for fistula evaluation.        ***ID***  Off antibiotics  Leukocytosis 14.66 -> 13.24, continue to trend.  Appreciate ID input      ***Endocrine***  Hx of DM2, continue glycemic control w/ ISS and  NPH.      ***Hematology***  Acute blood loss anemia and thrombocytopenia - no transfusion indication currently  SQ Heparin for DVT ppx           Patient requires continuous monitoring with bedside rhythm monitoring, pulse oximetry monitoring, and continuous central venous and arterial pressure monitoring; and intermittent blood gas analysis. Care plan discussed with the ICU care team.   Patient remained critical, at risk for life threatening decompensation.    I have spent 30 minutes providing critical care management to this patient.    By signing my name below, I, Abhijit Ngo, attest that this documentation has been prepared under the direction and in the presence of Jade Rosas MD  Electronically signed: Abhijit Ngo, 12-23-22 @ 07:40    I, Jade Rosas MD, personally performed the services described in this documentation. all medical record entries made by the scribe were at my direction and in my presence. I have reviewed the chart and agree that the record reflects my personal performance and is accurate and complete  Electronically signed: Jade Rosas MD Patient seen and examined at the bedside.    Remained critically ill on continuous ICU monitoring.      Brief Summary:  63 yo M admitted with cardiogenic shock s/p VA ECMO, decannulated 12/8/22.      24 Hour events:      OBJECTIVE:  Vital Signs Last 24 Hrs  T(C): 36.9 (23 Dec 2022 04:00), Max: 36.9 (22 Dec 2022 20:00)  T(F): 98.4 (23 Dec 2022 04:00), Max: 98.4 (22 Dec 2022 20:00)  HR: 105 (23 Dec 2022 07:00) (81 - 114)  BP: --  BP(mean): --  RR: 31 (23 Dec 2022 07:00) (15 - 36)  SpO2: 99% (23 Dec 2022 07:00) (93% - 100%)    Parameters below as of 23 Dec 2022 04:00  Patient On (Oxygen Delivery Method): ventilator    O2 Concentration (%): 40    Mode: SIMV (Synchronized Intermittent Mandatory Ventilation)  RR (machine): 12  TV (machine): 500  FiO2: 50  PEEP: 5  PS: 15  ITime: 1  MAP: 10  PC: 20  PIP: 20              Physical Exam:   General: OOB to chair, no acute distress  Neurology: Following commands  Eyes: Bilateral pupils equal and reactive   ENT/Neck: +Trach, Neck supple  Respiratory: Clear bilaterally   CV: S1S2, Sinus   Abdominal: Soft, NT, ND +BS   Extremities: Warm, with edema      -------------------------------------------------------------------------------------------------------------------------------                        8.7    13.24 )-----------( 122      ( 23 Dec 2022 01:22 )             28.3     12-23    134<L>  |  96  |  70<H>  ----------------------------<  239<H>  5.9<H>   |  22  |  4.21<H>    Ca    7.8<L>      23 Dec 2022 01:22  Phos  5.1     12-23  Mg     2.8     12-23    TPro  6.8  /  Alb  3.3  /  TBili  0.6  /  DBili  x   /  AST  40  /  ALT  33  /  AlkPhos  98  12-23    LIVER FUNCTIONS - ( 23 Dec 2022 01:22 )  Alb: 3.3 g/dL / Pro: 6.8 g/dL / ALK PHOS: 98 U/L / ALT: 33 U/L / AST: 40 U/L / GGT: x             ABG - ( 23 Dec 2022 02:30 )  pH, Arterial: 7.43  pH, Blood: x     /  pCO2: 37    /  pO2: 115   / HCO3: 25    / Base Excess: 0.4   /  SaO2: 98.7              ------------------------------------------------------------------------------------------------------------------------------  Assessment:  61 y/o male with PMH of CABG, DM, HTN, HLD presenting as transfer from [place] for c/f STEMI.     Cardiogenic shock s/p VA ECMO decannulated 12/8  Mitral regurgitation s/p Mitral clip x1 12/16/22  Acute respiratory distress/failure s/p Tracheostomy 12/16/22   IABP placed ( IABP dc'ed 12/17)   Leukocytosis  At high risk for hemodynamic instability  Anthony/Renal failure  Thrombocytopenia  Anemia        Plan:   ***Neuro***  History of SDH  PT ongoing.  Pain control with Tylenol     ***Cardiovascular***  At high risk for hemodynamic instability and cardiac arrhythmias.  TTE 12/19 showed EF 24% with normal RV function  Lopressor for heart rate control as BP allows.  Wean Vasopressin as able  PO Amiodarone   ASA /Statin daily        ***Pulmonary***  Respiratory failure s/p Tracheostomy - continue TC trials  Wean ventilator as tolerated.   Deep breathing and coughing exercises.        ***GI***  Protein calorie malnutrition - Tube feeds via nasal feeding tube  Protonix for stress ulcer prophylaxis   Bowel regimen with senna    ***Renal***  Renal failure - renal following, tolerating HD, 2 L removed 12/21  Replete electrolytes as indicated.  Vascular surgery consulted for fistula evaluation.        ***ID***  Off antibiotics  Leukocytosis 14.66 -> 13.24, continue to trend.  Appreciate ID input      ***Endocrine***  Hx of DM2, continue glycemic control w/ ISS and  NPH.      ***Hematology***  Acute blood loss anemia and thrombocytopenia - no transfusion indication currently  SQ Heparin for DVT ppx           Patient requires continuous monitoring with bedside rhythm monitoring, pulse oximetry monitoring, and continuous central venous and arterial pressure monitoring; and intermittent blood gas analysis. Care plan discussed with the ICU care team.   Patient remained critical, at risk for life threatening decompensation.    I have spent 30 minutes providing critical care management to this patient.    By signing my name below, I, Abhijit Ngo, attest that this documentation has been prepared under the direction and in the presence of Jade Rosas MD  Electronically signed: Abhijit Ngo, 12-23-22 @ 07:40    I, Jade Rosas MD, personally performed the services described in this documentation. all medical record entries made by the scribe were at my direction and in my presence. I have reviewed the chart and agree that the record reflects my personal performance and is accurate and complete  Electronically signed: Jade Rosas MD Patient seen and examined at the bedside.    Remained critically ill on continuous ICU monitoring.      Brief Summary:  63 yo M admitted with cardiogenic shock s/p VA ECMO, decannulated 12/8/22.      24 Hour events:  - TC for 15.5 hours, attempt for 18 today   - Diuretic challenge this AM (80 IV Lasix, Zaroxolyn 10)  - Holding Lopressor for now         OBJECTIVE:  Vital Signs Last 24 Hrs  T(C): 36.9 (23 Dec 2022 04:00), Max: 36.9 (22 Dec 2022 20:00)  T(F): 98.4 (23 Dec 2022 04:00), Max: 98.4 (22 Dec 2022 20:00)  HR: 105 (23 Dec 2022 07:00) (81 - 114)  BP: --  BP(mean): --  RR: 31 (23 Dec 2022 07:00) (15 - 36)  SpO2: 99% (23 Dec 2022 07:00) (93% - 100%)    Parameters below as of 23 Dec 2022 04:00  Patient On (Oxygen Delivery Method): ventilator    O2 Concentration (%): 40    Mode: SIMV (Synchronized Intermittent Mandatory Ventilation)  RR (machine): 12  TV (machine): 500  FiO2: 50  PEEP: 5  PS: 15  ITime: 1  MAP: 10  PC: 20  PIP: 20              Physical Exam:   General: OOB to chair, no acute distress  Neurology: Following commands  Eyes: Bilateral pupils equal and reactive   ENT/Neck: +Trach, Neck supple  Respiratory: Clear bilaterally   CV: S1S2, Sinus   Abdominal: Soft, NT, ND +BS   Extremities: Warm, with edema      -------------------------------------------------------------------------------------------------------------------------------                        8.7    13.24 )-----------( 122      ( 23 Dec 2022 01:22 )             28.3     12-23    134<L>  |  96  |  70<H>  ----------------------------<  239<H>  5.9<H>   |  22  |  4.21<H>    Ca    7.8<L>      23 Dec 2022 01:22  Phos  5.1     12-23  Mg     2.8     12-23    TPro  6.8  /  Alb  3.3  /  TBili  0.6  /  DBili  x   /  AST  40  /  ALT  33  /  AlkPhos  98  12-23    LIVER FUNCTIONS - ( 23 Dec 2022 01:22 )  Alb: 3.3 g/dL / Pro: 6.8 g/dL / ALK PHOS: 98 U/L / ALT: 33 U/L / AST: 40 U/L / GGT: x             ABG - ( 23 Dec 2022 02:30 )  pH, Arterial: 7.43  pH, Blood: x     /  pCO2: 37    /  pO2: 115   / HCO3: 25    / Base Excess: 0.4   /  SaO2: 98.7              ------------------------------------------------------------------------------------------------------------------------------  Assessment:  63 y/o male with PMH of CABG, DM, HTN, HLD presenting as transfer from [place] for c/f STEMI.     Cardiogenic shock s/p VA ECMO decannulated 12/8  Mitral regurgitation s/p Mitral clip x1 12/16/22  Acute respiratory distress/failure s/p Tracheostomy 12/16/22   IABP placed ( IABP dc'ed 12/17)   Leukocytosis  At high risk for hemodynamic instability  Anthony/Renal failure  Thrombocytopenia  Anemia        Plan:   ***Neuro***  History of SDH  PT ongoing.  Pain control with Tylenol     ***Cardiovascular***  At high risk for hemodynamic instability and cardiac arrhythmias.  TTE 12/19 showed EF 24% with normal RV function  Lopressor for heart rate control as BP allows, held for now   Wean Vasopressin as able  PO Amiodarone   ASA /Statin daily        ***Pulmonary***  Respiratory failure s/p Tracheostomy - continue TC trials  Trach Collar for 15.5 hours, attempt for 18 hours today    Wean ventilator as tolerated.   Deep breathing and coughing exercises.        ***GI***  Protein calorie malnutrition - Tube feeds via nasal feeding tube  Protonix for stress ulcer prophylaxis   Bowel regimen with senna    ***Renal***  Renal failure - renal following, tolerating HD, 2 L removed 12/21  Replete electrolytes as indicated.  Vascular surgery consulted for fistula evaluation.  Diuretic challenge this AM (80 IV Lasix, Zaroxolyn 10)  Dialysis today, will start on diuretics tomorrow  Assess for permacath       ***ID***  Off antibiotics  Leukocytosis 14.66 -> 13.24, continue to trend.  Appreciate ID input      ***Endocrine***  Hx of DM2, continue glycemic control w/ ISS and  NPH.      ***Hematology***  Acute blood loss anemia and thrombocytopenia - no transfusion indication currently  SQ Heparin for DVT prophylaxis            Patient requires continuous monitoring with bedside rhythm monitoring, pulse oximetry monitoring, and continuous central venous and arterial pressure monitoring; and intermittent blood gas analysis. Care plan discussed with the ICU care team.   Patient remained critical, at risk for life threatening decompensation.    I have spent 30 minutes providing critical care management to this patient.    By signing my name below, I, Abhijit Huber, attest that this documentation has been prepared under the direction and in the presence of Jade Rosas MD  Electronically signed: Abhijit Ngo, 12-23-22 @ 07:40    I, Jade Rosas MD, personally performed the services described in this documentation. all medical record entries made by the scribe were at my direction and in my presence. I have reviewed the chart and agree that the record reflects my personal performance and is accurate and complete  Electronically signed: Jade Rosas MD Patient seen and examined at the bedside.    Remained critically ill on continuous ICU monitoring.      Brief Summary:  63 yo M admitted with cardiogenic shock s/p VA ECMO, decannulated 12/8/22.      24 Hour events:  - TC for 15.5 hours, attempt for 18 today   - Diuretic challenge this AM (80 IV Lasix, Zaroxolyn 10)  - Holding Lopressor for now         OBJECTIVE:  Vital Signs Last 24 Hrs  T(C): 36.9 (23 Dec 2022 04:00), Max: 36.9 (22 Dec 2022 20:00)  T(F): 98.4 (23 Dec 2022 04:00), Max: 98.4 (22 Dec 2022 20:00)  HR: 105 (23 Dec 2022 07:00) (81 - 114)  BP: --  BP(mean): --  RR: 31 (23 Dec 2022 07:00) (15 - 36)  SpO2: 99% (23 Dec 2022 07:00) (93% - 100%)    Parameters below as of 23 Dec 2022 04:00  Patient On (Oxygen Delivery Method): ventilator    O2 Concentration (%): 40    Mode: SIMV (Synchronized Intermittent Mandatory Ventilation)  RR (machine): 12  TV (machine): 500  FiO2: 50  PEEP: 5  PS: 15  ITime: 1  MAP: 10  PC: 20  PIP: 20              Physical Exam:   General: OOB to chair, no acute distress  Neurology: Following commands  Eyes: Bilateral pupils equal and reactive   ENT/Neck: +Trach, Neck supple  Respiratory: Clear bilaterally   CV: S1S2, Sinus   Abdominal: Soft, NT, ND +BS   Extremities: Warm, with edema      -------------------------------------------------------------------------------------------------------------------------------                        8.7    13.24 )-----------( 122      ( 23 Dec 2022 01:22 )             28.3     12-23    134<L>  |  96  |  70<H>  ----------------------------<  239<H>  5.9<H>   |  22  |  4.21<H>    Ca    7.8<L>      23 Dec 2022 01:22  Phos  5.1     12-23  Mg     2.8     12-23    TPro  6.8  /  Alb  3.3  /  TBili  0.6  /  DBili  x   /  AST  40  /  ALT  33  /  AlkPhos  98  12-23    LIVER FUNCTIONS - ( 23 Dec 2022 01:22 )  Alb: 3.3 g/dL / Pro: 6.8 g/dL / ALK PHOS: 98 U/L / ALT: 33 U/L / AST: 40 U/L / GGT: x             ABG - ( 23 Dec 2022 02:30 )  pH, Arterial: 7.43  pH, Blood: x     /  pCO2: 37    /  pO2: 115   / HCO3: 25    / Base Excess: 0.4   /  SaO2: 98.7              ------------------------------------------------------------------------------------------------------------------------------  Assessment:  61 y/o male with PMH of CABG, DM, HTN, HLD presenting as transfer from [place] for c/f STEMI.     Cardiogenic shock s/p VA ECMO decannulated 12/8  Mitral regurgitation s/p Mitral clip x1 12/16/22  Acute respiratory distress/failure s/p Tracheostomy 12/16/22   IABP placed ( IABP dc'ed 12/17)   Leukocytosis  At high risk for hemodynamic instability  Anthony/Renal failure  Thrombocytopenia  Anemia        Plan:   ***Neuro***  History of SDH  PT ongoing.  Pain control with Tylenol     ***Cardiovascular***  At high risk for hemodynamic instability and cardiac arrhythmias.  TTE 12/19 showed EF 24% with normal RV function  Lopressor for heart rate control as BP allows, held for now   Wean Vasopressin as able  PO Amiodarone   ASA /Statin daily        ***Pulmonary***  Respiratory failure s/p Tracheostomy - continue TC trials  Trach Collar for 15.5 hours, attempt for 18 hours today    Wean ventilator as tolerated.   Deep breathing and coughing exercises.        ***GI***  Protein calorie malnutrition - Tube feeds via nasal feeding tube  Protonix for stress ulcer prophylaxis   Bowel regimen with senna    ***Renal***  Renal failure - renal following, tolerating HD, 2 L removed 12/21  Replete electrolytes as indicated.  Vascular surgery consulted for fistula evaluation.  Diuretic challenge this AM (80 IV Lasix, Zaroxolyn 10)  Dialysis today, will start on diuretics tomorrow  Assess for permacath       ***ID***  Off antibiotics  Leukocytosis 14.66 -> 13.24, continue to trend.  Appreciate ID input      ***Endocrine***  Hx of DM2, continue glycemic control w/ ISS and  NPH.      ***Hematology***  Acute blood loss anemia and thrombocytopenia - no transfusion indication currently  SQ Heparin for DVT prophylaxis            Patient requires continuous monitoring with bedside rhythm monitoring, pulse oximetry monitoring, and continuous central venous and arterial pressure monitoring; and intermittent blood gas analysis. Care plan discussed with the ICU care team.   Patient remained critical, at risk for life threatening decompensation.    I have spent 30 minutes providing critical care management to this patient.    By signing my name below, I, Abhijit Huber, attest that this documentation has been prepared under the direction and in the presence of Jade Rosas MD  Electronically signed: Abhijit Ngo, 12-23-22 @ 07:40    I, Jade Rosas MD, personally performed the services described in this documentation. all medical record entries made by the scribe were at my direction and in my presence. I have reviewed the chart and agree that the record reflects my personal performance and is accurate and complete  Electronically signed: aJde Rosas MD Patient seen and examined at the bedside.    Remained critically ill on continuous ICU monitoring.      Brief Summary:  63 yo M admitted with cardiogenic shock s/p VA ECMO, decannulated 12/8/22.      24 Hour events:  - TC for 15.5 hours, attempt for 18 today   - Diuretic challenge this AM (80 IV Lasix, Zaroxolyn 10)  - Holding Lopressor for now         OBJECTIVE:  Vital Signs Last 24 Hrs  T(C): 36.9 (23 Dec 2022 04:00), Max: 36.9 (22 Dec 2022 20:00)  T(F): 98.4 (23 Dec 2022 04:00), Max: 98.4 (22 Dec 2022 20:00)  HR: 105 (23 Dec 2022 07:00) (81 - 114)  BP: --  BP(mean): --  RR: 31 (23 Dec 2022 07:00) (15 - 36)  SpO2: 99% (23 Dec 2022 07:00) (93% - 100%)    Parameters below as of 23 Dec 2022 04:00  Patient On (Oxygen Delivery Method): ventilator    O2 Concentration (%): 40    Mode: SIMV (Synchronized Intermittent Mandatory Ventilation)  RR (machine): 12  TV (machine): 500  FiO2: 50  PEEP: 5  PS: 15  ITime: 1  MAP: 10  PC: 20  PIP: 20              Physical Exam:   General: OOB to chair, no acute distress  Neurology: Following commands  Eyes: Bilateral pupils equal and reactive   ENT/Neck: +Trach, Neck supple  Respiratory: Clear bilaterally   CV: S1S2, Sinus   Abdominal: Soft, NT, ND +BS   Extremities: Warm, with edema      -------------------------------------------------------------------------------------------------------------------------------                        8.7    13.24 )-----------( 122      ( 23 Dec 2022 01:22 )             28.3     12-23    134<L>  |  96  |  70<H>  ----------------------------<  239<H>  5.9<H>   |  22  |  4.21<H>    Ca    7.8<L>      23 Dec 2022 01:22  Phos  5.1     12-23  Mg     2.8     12-23    TPro  6.8  /  Alb  3.3  /  TBili  0.6  /  DBili  x   /  AST  40  /  ALT  33  /  AlkPhos  98  12-23    LIVER FUNCTIONS - ( 23 Dec 2022 01:22 )  Alb: 3.3 g/dL / Pro: 6.8 g/dL / ALK PHOS: 98 U/L / ALT: 33 U/L / AST: 40 U/L / GGT: x             ABG - ( 23 Dec 2022 02:30 )  pH, Arterial: 7.43  pH, Blood: x     /  pCO2: 37    /  pO2: 115   / HCO3: 25    / Base Excess: 0.4   /  SaO2: 98.7              ------------------------------------------------------------------------------------------------------------------------------  Assessment:  61 y/o male with PMH of CABG, DM, HTN, HLD presenting as transfer from [place] for c/f STEMI.     Cardiogenic shock s/p VA ECMO decannulated 12/8  Mitral regurgitation s/p Mitral clip x1 12/16/22  Acute respiratory distress/failure s/p Tracheostomy 12/16/22   IABP placed ( IABP dc'ed 12/17)   Leukocytosis  At high risk for hemodynamic instability  Anthony/Renal failure  Thrombocytopenia  Anemia        Plan:   ***Neuro***  History of SDH  PT ongoing.  Pain control with Tylenol     ***Cardiovascular***  At high risk for hemodynamic instability and cardiac arrhythmias.  TTE 12/19 showed EF 24% with normal RV function  Lopressor for heart rate control as BP allows, held for now   Wean Vasopressin as able  PO Amiodarone   ASA /Statin daily        ***Pulmonary***  Respiratory failure s/p Tracheostomy - continue TC trials  Trach Collar for 15.5 hours, attempt for 18 hours today    Wean ventilator as tolerated.   Deep breathing and coughing exercises.        ***GI***  Protein calorie malnutrition - Tube feeds via nasal feeding tube  Protonix for stress ulcer prophylaxis   Bowel regimen with senna    ***Renal***  Renal failure - renal following, tolerating HD, 2 L removed 12/21  Replete electrolytes as indicated.  Vascular surgery consulted for fistula evaluation.  Diuretic challenge this AM (80 IV Lasix, Zaroxolyn 10)  Dialysis today, will start on diuretics tomorrow  Assess for permacath       ***ID***  Off antibiotics  Leukocytosis 14.66 -> 13.24, continue to trend.  Appreciate ID input      ***Endocrine***  Hx of DM2, continue glycemic control w/ ISS and  NPH.      ***Hematology***  Acute blood loss anemia and thrombocytopenia - no transfusion indication currently  SQ Heparin for DVT prophylaxis            Patient requires continuous monitoring with bedside rhythm monitoring, pulse oximetry monitoring, and continuous central venous and arterial pressure monitoring; and intermittent blood gas analysis. Care plan discussed with the ICU care team.   Patient remained critical, at risk for life threatening decompensation.    I have spent 30 minutes providing critical care management to this patient.    By signing my name below, I, Abhijit Huber, attest that this documentation has been prepared under the direction and in the presence of Jade Rosas MD  Electronically signed: Abhijit Ngo, 12-23-22 @ 07:40    I, Jade Rosas MD, personally performed the services described in this documentation. all medical record entries made by the scribe were at my direction and in my presence. I have reviewed the chart and agree that the record reflects my personal performance and is accurate and complete  Electronically signed: Jade Rosas MD Patient seen and examined at the bedside.    Remains critically ill on continuous ICU monitoring.      Brief Summary:  61 yo M admitted with cardiogenic shock s/p VA ECMO, decannulated 12/8/22.      24 Hour events:  - TC for 15.5 hours, attempt for 18 today   - Diuretic challenge this AM (80 IV Lasix, Zaroxolyn 10)  - Holding Lopressor for now       OBJECTIVE:  Vital Signs Last 24 Hrs  T(C): 36.9 (23 Dec 2022 04:00), Max: 36.9 (22 Dec 2022 20:00)  T(F): 98.4 (23 Dec 2022 04:00), Max: 98.4 (22 Dec 2022 20:00)  HR: 105 (23 Dec 2022 07:00) (81 - 114)  BP: --  BP(mean): --  RR: 31 (23 Dec 2022 07:00) (15 - 36)  SpO2: 99% (23 Dec 2022 07:00) (93% - 100%)    Parameters below as of 23 Dec 2022  Patient On (Oxygen Delivery Method): Trach collar    O2 Concentration (%): 40      Physical Exam:   General: OOB to chair, no acute distress  Neurology: Following commands  Eyes: Bilateral pupils equal and reactive   ENT/Neck: +Trach, Neck supple  Respiratory: Clear bilaterally   CV: S1S2, Sinus   Abdominal: Soft, NT, ND +BS   Extremities: Warm      -------------------------------------------------------------------------------------------------------------------------------                        8.7    13.24 )-----------( 122      ( 23 Dec 2022 01:22 )             28.3     12-23    134<L>  |  96  |  70<H>  ----------------------------<  239<H>  5.9<H>   |  22  |  4.21<H>    Ca    7.8<L>      23 Dec 2022 01:22  Phos  5.1     12-23  Mg     2.8     12-23    TPro  6.8  /  Alb  3.3  /  TBili  0.6  /  DBili  x   /  AST  40  /  ALT  33  /  AlkPhos  98  12-23    LIVER FUNCTIONS - ( 23 Dec 2022 01:22 )  Alb: 3.3 g/dL / Pro: 6.8 g/dL / ALK PHOS: 98 U/L / ALT: 33 U/L / AST: 40 U/L / GGT: x             ABG - ( 23 Dec 2022 02:30 )  pH, Arterial: 7.43  pH, Blood: x     /  pCO2: 37    /  pO2: 115   / HCO3: 25    / Base Excess: 0.4   /  SaO2: 98.7              ------------------------------------------------------------------------------------------------------------------------------  Assessment:  61 y/o male with PMH of CABG, DM, HTN, HLD presenting as transfer from [place] for c/f STEMI.     Cardiogenic shock s/p VA ECMO decannulated 12/8  Mitral regurgitation s/p Mitral clip x1 12/16/22  Acute respiratory distress/failure s/p Tracheostomy 12/16/22   IABP placed ( IABP dc'ed 12/17)   At high risk for hemodynamic instability  ERIC/Renal failure  Thrombocytopenia  Anemia        Plan:   ***Neuro***  History of SDH  PT ongoing.  Pain control with Tylenol     ***Cardiovascular***  At high risk for hemodynamic instability and cardiac arrhythmias.  TTE 12/19 showed EF 24% with normal RV function  Lopressor for heart rate control as BP allows, held for now   PO Amiodarone   ASA /Statin daily    ***Pulmonary***  Respiratory failure s/p Tracheostomy - continue Trach collar trials  Wean ventilator as tolerated.   Deep breathing and coughing exercises.    ***GI***  Protein calorie malnutrition - Tube feeds via nasal feeding tube  Protonix for stress ulcer prophylaxis   Bowel regimen with senna    ***Renal***  Renal failure - Continue intermittent HD  Hyperkalemia - Medically managed and plan for HD session today.  Replete electrolytes as indicated.  Vascular surgery consulted for fistula evaluation.  Assess for permacath   Plan for diuretic challenge likely tomorrow    ***ID***  Off antibiotics  Leukocytosis 14.66 -> 13.24, continue to trend.  Appreciate ID input    ***Endocrine***  Hx of DM2, continue glycemic control w/ ISS and  NPH.    ***Hematology***  Acute blood loss anemia and thrombocytopenia - no transfusion indication currently  SQ Heparin for DVT prophylaxis            Patient requires continuous monitoring with bedside rhythm monitoring, pulse oximetry monitoring, and continuous central venous and arterial pressure monitoring; and intermittent blood gas analysis. Care plan discussed with the ICU care team.   Patient remained critical, at risk for life threatening decompensation.    I have spent 35 minutes providing critical care management to this patient.    By signing my name below, I, Abhijit Ngo, attest that this documentation has been prepared under the direction and in the presence of Jade oRsas MD  Electronically signed: Abhijit Ngo, 12-23-22 @ 07:40    I, Jade Rosas MD, personally performed the services described in this documentation. all medical record entries made by the scribe were at my direction and in my presence. I have reviewed the chart and agree that the record reflects my personal performance and is accurate and complete  Electronically signed: Jade Rosas MD Patient seen and examined at the bedside.    Remains critically ill on continuous ICU monitoring.      Brief Summary:  63 yo M admitted with cardiogenic shock s/p VA ECMO, decannulated 12/8/22.      24 Hour events:  - TC for 15.5 hours, attempt for 18 today   - Diuretic challenge this AM (80 IV Lasix, Zaroxolyn 10)  - Holding Lopressor for now       OBJECTIVE:  Vital Signs Last 24 Hrs  T(C): 36.9 (23 Dec 2022 04:00), Max: 36.9 (22 Dec 2022 20:00)  T(F): 98.4 (23 Dec 2022 04:00), Max: 98.4 (22 Dec 2022 20:00)  HR: 105 (23 Dec 2022 07:00) (81 - 114)  BP: --  BP(mean): --  RR: 31 (23 Dec 2022 07:00) (15 - 36)  SpO2: 99% (23 Dec 2022 07:00) (93% - 100%)    Parameters below as of 23 Dec 2022  Patient On (Oxygen Delivery Method): Trach collar    O2 Concentration (%): 40      Physical Exam:   General: OOB to chair, no acute distress  Neurology: Following commands  Eyes: Bilateral pupils equal and reactive   ENT/Neck: +Trach, Neck supple  Respiratory: Clear bilaterally   CV: S1S2, Sinus   Abdominal: Soft, NT, ND +BS   Extremities: Warm      -------------------------------------------------------------------------------------------------------------------------------                        8.7    13.24 )-----------( 122      ( 23 Dec 2022 01:22 )             28.3     12-23    134<L>  |  96  |  70<H>  ----------------------------<  239<H>  5.9<H>   |  22  |  4.21<H>    Ca    7.8<L>      23 Dec 2022 01:22  Phos  5.1     12-23  Mg     2.8     12-23    TPro  6.8  /  Alb  3.3  /  TBili  0.6  /  DBili  x   /  AST  40  /  ALT  33  /  AlkPhos  98  12-23    LIVER FUNCTIONS - ( 23 Dec 2022 01:22 )  Alb: 3.3 g/dL / Pro: 6.8 g/dL / ALK PHOS: 98 U/L / ALT: 33 U/L / AST: 40 U/L / GGT: x             ABG - ( 23 Dec 2022 02:30 )  pH, Arterial: 7.43  pH, Blood: x     /  pCO2: 37    /  pO2: 115   / HCO3: 25    / Base Excess: 0.4   /  SaO2: 98.7              ------------------------------------------------------------------------------------------------------------------------------  Assessment:  63 y/o male with PMH of CABG, DM, HTN, HLD presenting as transfer from [place] for c/f STEMI.     Cardiogenic shock s/p VA ECMO decannulated 12/8  Mitral regurgitation s/p Mitral clip x1 12/16/22  Acute respiratory distress/failure s/p Tracheostomy 12/16/22   IABP placed ( IABP dc'ed 12/17)   At high risk for hemodynamic instability  ERIC/Renal failure  Thrombocytopenia  Anemia        Plan:   ***Neuro***  History of SDH  PT ongoing.  Pain control with Tylenol     ***Cardiovascular***  At high risk for hemodynamic instability and cardiac arrhythmias.  TTE 12/19 showed EF 24% with normal RV function  Lopressor for heart rate control as BP allows, held for now   PO Amiodarone   ASA /Statin daily    ***Pulmonary***  Respiratory failure s/p Tracheostomy - continue Trach collar trials  Wean ventilator as tolerated.   Deep breathing and coughing exercises.    ***GI***  Protein calorie malnutrition - Tube feeds via nasal feeding tube  Protonix for stress ulcer prophylaxis   Bowel regimen with senna    ***Renal***  Renal failure - Continue intermittent HD  Hyperkalemia - Medically managed and plan for HD session today.  Replete electrolytes as indicated.  Vascular surgery consulted for fistula evaluation.  Assess for permacath   Plan for diuretic challenge likely tomorrow    ***ID***  Off antibiotics  Leukocytosis 14.66 -> 13.24, continue to trend.  Appreciate ID input    ***Endocrine***  Hx of DM2, continue glycemic control w/ ISS and  NPH.    ***Hematology***  Acute blood loss anemia and thrombocytopenia - no transfusion indication currently  SQ Heparin for DVT prophylaxis            Patient requires continuous monitoring with bedside rhythm monitoring, pulse oximetry monitoring, and continuous central venous and arterial pressure monitoring; and intermittent blood gas analysis. Care plan discussed with the ICU care team.   Patient remained critical, at risk for life threatening decompensation.    I have spent 35 minutes providing critical care management to this patient.    By signing my name below, I, Abhijit Ngo, attest that this documentation has been prepared under the direction and in the presence of Jade Rosas MD  Electronically signed: Abhijit Ngo, 12-23-22 @ 07:40    I, Jade Rosas MD, personally performed the services described in this documentation. all medical record entries made by the scribe were at my direction and in my presence. I have reviewed the chart and agree that the record reflects my personal performance and is accurate and complete  Electronically signed: Jade Rosas MD Patient seen and examined at the bedside.    Remains critically ill on continuous ICU monitoring.      Brief Summary:  61 yo M admitted with cardiogenic shock s/p VA ECMO, decannulated 12/8/22.      24 Hour events:  - TC for 15.5 hours, attempt for 18 today   - Diuretic challenge this AM (80 IV Lasix, Zaroxolyn 10)  - Holding Lopressor for now       OBJECTIVE:  Vital Signs Last 24 Hrs  T(C): 36.9 (23 Dec 2022 04:00), Max: 36.9 (22 Dec 2022 20:00)  T(F): 98.4 (23 Dec 2022 04:00), Max: 98.4 (22 Dec 2022 20:00)  HR: 105 (23 Dec 2022 07:00) (81 - 114)  BP: --  BP(mean): --  RR: 31 (23 Dec 2022 07:00) (15 - 36)  SpO2: 99% (23 Dec 2022 07:00) (93% - 100%)    Parameters below as of 23 Dec 2022  Patient On (Oxygen Delivery Method): Trach collar    O2 Concentration (%): 40      Physical Exam:   General: OOB to chair, no acute distress  Neurology: Following commands  Eyes: Bilateral pupils equal and reactive   ENT/Neck: +Trach, Neck supple  Respiratory: Clear bilaterally   CV: S1S2, Sinus   Abdominal: Soft, NT, ND +BS   Extremities: Warm      -------------------------------------------------------------------------------------------------------------------------------                        8.7    13.24 )-----------( 122      ( 23 Dec 2022 01:22 )             28.3     12-23    134<L>  |  96  |  70<H>  ----------------------------<  239<H>  5.9<H>   |  22  |  4.21<H>    Ca    7.8<L>      23 Dec 2022 01:22  Phos  5.1     12-23  Mg     2.8     12-23    TPro  6.8  /  Alb  3.3  /  TBili  0.6  /  DBili  x   /  AST  40  /  ALT  33  /  AlkPhos  98  12-23    LIVER FUNCTIONS - ( 23 Dec 2022 01:22 )  Alb: 3.3 g/dL / Pro: 6.8 g/dL / ALK PHOS: 98 U/L / ALT: 33 U/L / AST: 40 U/L / GGT: x             ABG - ( 23 Dec 2022 02:30 )  pH, Arterial: 7.43  pH, Blood: x     /  pCO2: 37    /  pO2: 115   / HCO3: 25    / Base Excess: 0.4   /  SaO2: 98.7              ------------------------------------------------------------------------------------------------------------------------------  Assessment:  63 y/o male with PMH of CABG, DM, HTN, HLD presenting as transfer from [place] for c/f STEMI.     Cardiogenic shock s/p VA ECMO decannulated 12/8  Mitral regurgitation s/p Mitral clip x1 12/16/22  Acute respiratory distress/failure s/p Tracheostomy 12/16/22   IABP placed ( IABP dc'ed 12/17)   At high risk for hemodynamic instability  ERIC/Renal failure  Thrombocytopenia  Anemia        Plan:   ***Neuro***  History of SDH  PT ongoing.  Pain control with Tylenol     ***Cardiovascular***  At high risk for hemodynamic instability and cardiac arrhythmias.  TTE 12/19 showed EF 24% with normal RV function  Lopressor for heart rate control as BP allows, held for now   PO Amiodarone   ASA /Statin daily    ***Pulmonary***  Respiratory failure s/p Tracheostomy - continue Trach collar trials  Wean ventilator as tolerated.   Deep breathing and coughing exercises.    ***GI***  Protein calorie malnutrition - Tube feeds via nasal feeding tube  Protonix for stress ulcer prophylaxis   Bowel regimen with senna    ***Renal***  Renal failure - Continue intermittent HD  Hyperkalemia - Medically managed and plan for HD session today.  Replete electrolytes as indicated.  Vascular surgery consulted for fistula evaluation.  Assess for permacath   Plan for diuretic challenge likely tomorrow    ***ID***  Off antibiotics  Leukocytosis 14.66 -> 13.24, continue to trend.  Appreciate ID input    ***Endocrine***  Hx of DM2, continue glycemic control w/ ISS and  NPH.    ***Hematology***  Acute blood loss anemia and thrombocytopenia - no transfusion indication currently  SQ Heparin for DVT prophylaxis            Patient requires continuous monitoring with bedside rhythm monitoring, pulse oximetry monitoring, and continuous central venous and arterial pressure monitoring; and intermittent blood gas analysis. Care plan discussed with the ICU care team.   Patient remained critical, at risk for life threatening decompensation.    I have spent 35 minutes providing critical care management to this patient.    By signing my name below, I, Abhijit Ngo, attest that this documentation has been prepared under the direction and in the presence of Jade Rosas MD  Electronically signed: Abhijit Ngo, 12-23-22 @ 07:40    I, Jade Rosas MD, personally performed the services described in this documentation. all medical record entries made by the scribe were at my direction and in my presence. I have reviewed the chart and agree that the record reflects my personal performance and is accurate and complete  Electronically signed: Jade Rosas MD

## 2022-12-24 LAB
ALBUMIN SERPL ELPH-MCNC: 3.3 G/DL — SIGNIFICANT CHANGE UP (ref 3.3–5)
ALP SERPL-CCNC: 94 U/L — SIGNIFICANT CHANGE UP (ref 40–120)
ALT FLD-CCNC: 27 U/L — SIGNIFICANT CHANGE UP (ref 10–45)
ANION GAP SERPL CALC-SCNC: 13 MMOL/L — SIGNIFICANT CHANGE UP (ref 5–17)
AST SERPL-CCNC: 32 U/L — SIGNIFICANT CHANGE UP (ref 10–40)
BILIRUB SERPL-MCNC: 0.8 MG/DL — SIGNIFICANT CHANGE UP (ref 0.2–1.2)
BUN SERPL-MCNC: 48 MG/DL — HIGH (ref 7–23)
CALCIUM SERPL-MCNC: 8.3 MG/DL — LOW (ref 8.4–10.5)
CHLORIDE SERPL-SCNC: 98 MMOL/L — SIGNIFICANT CHANGE UP (ref 96–108)
CO2 SERPL-SCNC: 26 MMOL/L — SIGNIFICANT CHANGE UP (ref 22–31)
CREAT SERPL-MCNC: 3.22 MG/DL — HIGH (ref 0.5–1.3)
EGFR: 21 ML/MIN/1.73M2 — LOW
GAS PNL BLDA: SIGNIFICANT CHANGE UP
GLUCOSE BLDC GLUCOMTR-MCNC: 148 MG/DL — HIGH (ref 70–99)
GLUCOSE BLDC GLUCOMTR-MCNC: 163 MG/DL — HIGH (ref 70–99)
GLUCOSE BLDC GLUCOMTR-MCNC: 172 MG/DL — HIGH (ref 70–99)
GLUCOSE BLDC GLUCOMTR-MCNC: 187 MG/DL — HIGH (ref 70–99)
GLUCOSE SERPL-MCNC: 189 MG/DL — HIGH (ref 70–99)
HCT VFR BLD CALC: 27.1 % — LOW (ref 39–50)
HGB BLD-MCNC: 8.2 G/DL — LOW (ref 13–17)
MAGNESIUM SERPL-MCNC: 2.5 MG/DL — SIGNIFICANT CHANGE UP (ref 1.6–2.6)
MCHC RBC-ENTMCNC: 28.4 PG — SIGNIFICANT CHANGE UP (ref 27–34)
MCHC RBC-ENTMCNC: 30.3 GM/DL — LOW (ref 32–36)
MCV RBC AUTO: 93.8 FL — SIGNIFICANT CHANGE UP (ref 80–100)
NRBC # BLD: 0 /100 WBCS — SIGNIFICANT CHANGE UP (ref 0–0)
PHOSPHATE SERPL-MCNC: 4.3 MG/DL — SIGNIFICANT CHANGE UP (ref 2.5–4.5)
PLATELET # BLD AUTO: 88 K/UL — LOW (ref 150–400)
POTASSIUM BLDA-SCNC: 4.6 MMOL/L — SIGNIFICANT CHANGE UP (ref 3.5–5.1)
POTASSIUM SERPL-MCNC: 4.4 MMOL/L — SIGNIFICANT CHANGE UP (ref 3.5–5.3)
POTASSIUM SERPL-SCNC: 4.4 MMOL/L — SIGNIFICANT CHANGE UP (ref 3.5–5.3)
PROT SERPL-MCNC: 6.8 G/DL — SIGNIFICANT CHANGE UP (ref 6–8.3)
RBC # BLD: 2.89 M/UL — LOW (ref 4.2–5.8)
RBC # FLD: 18.6 % — HIGH (ref 10.3–14.5)
SODIUM SERPL-SCNC: 137 MMOL/L — SIGNIFICANT CHANGE UP (ref 135–145)
WBC # BLD: 12.07 K/UL — HIGH (ref 3.8–10.5)
WBC # FLD AUTO: 12.07 K/UL — HIGH (ref 3.8–10.5)

## 2022-12-24 PROCEDURE — 99233 SBSQ HOSP IP/OBS HIGH 50: CPT | Mod: GC

## 2022-12-24 PROCEDURE — 71045 X-RAY EXAM CHEST 1 VIEW: CPT | Mod: 26

## 2022-12-24 PROCEDURE — 99291 CRITICAL CARE FIRST HOUR: CPT

## 2022-12-24 RX ORDER — FUROSEMIDE 40 MG
100 TABLET ORAL ONCE
Refills: 0 | Status: COMPLETED | OUTPATIENT
Start: 2022-12-24 | End: 2022-12-24

## 2022-12-24 RX ORDER — FUROSEMIDE 40 MG
20 TABLET ORAL
Qty: 500 | Refills: 0 | Status: DISCONTINUED | OUTPATIENT
Start: 2022-12-24 | End: 2022-12-26

## 2022-12-24 RX ORDER — FUROSEMIDE 40 MG
100 TABLET ORAL ONCE
Refills: 0 | Status: DISCONTINUED | OUTPATIENT
Start: 2022-12-24 | End: 2022-12-24

## 2022-12-24 RX ADMIN — SENNA PLUS 2 TABLET(S): 8.6 TABLET ORAL at 22:21

## 2022-12-24 RX ADMIN — HEPARIN SODIUM 5000 UNIT(S): 5000 INJECTION INTRAVENOUS; SUBCUTANEOUS at 17:18

## 2022-12-24 RX ADMIN — HUMAN INSULIN 7 UNIT(S): 100 INJECTION, SUSPENSION SUBCUTANEOUS at 00:43

## 2022-12-24 RX ADMIN — Medication 2: at 17:19

## 2022-12-24 RX ADMIN — HUMAN INSULIN 7 UNIT(S): 100 INJECTION, SUSPENSION SUBCUTANEOUS at 17:19

## 2022-12-24 RX ADMIN — AMIODARONE HYDROCHLORIDE 200 MILLIGRAM(S): 400 TABLET ORAL at 05:18

## 2022-12-24 RX ADMIN — Medication 81 MILLIGRAM(S): at 11:08

## 2022-12-24 RX ADMIN — CHLORHEXIDINE GLUCONATE 1 APPLICATION(S): 213 SOLUTION TOPICAL at 05:19

## 2022-12-24 RX ADMIN — CHLORHEXIDINE GLUCONATE 1 APPLICATION(S): 213 SOLUTION TOPICAL at 05:20

## 2022-12-24 RX ADMIN — Medication 120 MILLIGRAM(S): at 11:41

## 2022-12-24 RX ADMIN — HEPARIN SODIUM 5000 UNIT(S): 5000 INJECTION INTRAVENOUS; SUBCUTANEOUS at 09:31

## 2022-12-24 RX ADMIN — HUMAN INSULIN 7 UNIT(S): 100 INJECTION, SUSPENSION SUBCUTANEOUS at 11:17

## 2022-12-24 RX ADMIN — Medication 5 MG/HR: at 12:28

## 2022-12-24 RX ADMIN — HEPARIN SODIUM 5000 UNIT(S): 5000 INJECTION INTRAVENOUS; SUBCUTANEOUS at 00:42

## 2022-12-24 RX ADMIN — Medication 100 MILLIGRAM(S): at 11:07

## 2022-12-24 RX ADMIN — ATORVASTATIN CALCIUM 80 MILLIGRAM(S): 80 TABLET, FILM COATED ORAL at 22:21

## 2022-12-24 RX ADMIN — Medication 2: at 05:18

## 2022-12-24 RX ADMIN — HUMAN INSULIN 7 UNIT(S): 100 INJECTION, SUSPENSION SUBCUTANEOUS at 05:19

## 2022-12-24 RX ADMIN — Medication 10 MG/HR: at 16:38

## 2022-12-24 RX ADMIN — CHLORHEXIDINE GLUCONATE 15 MILLILITER(S): 213 SOLUTION TOPICAL at 17:19

## 2022-12-24 RX ADMIN — Medication 15 MILLILITER(S): at 11:17

## 2022-12-24 RX ADMIN — PANTOPRAZOLE SODIUM 40 MILLIGRAM(S): 20 TABLET, DELAYED RELEASE ORAL at 11:09

## 2022-12-24 RX ADMIN — CHLORHEXIDINE GLUCONATE 15 MILLILITER(S): 213 SOLUTION TOPICAL at 05:18

## 2022-12-24 RX ADMIN — Medication 2: at 00:43

## 2022-12-24 NOTE — PROGRESS NOTE ADULT - SUBJECTIVE AND OBJECTIVE BOX
[facility] DIVISION OF KIDNEY DISEASES AND HYPERTENSION -- FOLLOW UP NOTE  --------------------------------------------------------------------------------  Chief Complaint: Acute pancreatitis without infection or necrosis    HPI:  63 y/o male with PMH of CABG, DM, HTN, HLD presenting as transfer from [place] for c/f STEMI. Course c/b gallstone pancreatitis leading to ARDS and shock & cardiac arrest now on [facility]O. Had significant troponin elevation and his LVEF down to 8%. Pt was deemed to be too unstable to have an angiogram and potential PCI due to his co-morbidities & a new subdural bleed. Pt being seen for ERIC. SCr on arrival was 2.15; trended down to hector 1.6 on 11/25 and eventually increased to 3.95 11/28. Pt received IV diuretics as per CTU. Pt initiated on CRRT on 12/5/22 to optimize volume status and electrolytes.  Pt underwent decannulation of ECMO on 12/8/22. Pt was restarted on 12/9/22 for volume overload. Pt underwent mitral clip OR (12/16/22).  s/p trach on 12/16     HPI: Pt. seen this AM. Tolerated HD yesterday. Now started on Lasix gtt and given Metolazone.       PAST HISTORY  --------------------------------------------------------------------------------  No significant changes to PMH, PSH, FHx, SHx, unless otherwise noted    ALLERGIES & MEDICATIONS  --------------------------------------------------------------------------------  Allergies    No Known Allergies    Intolerances      Standing Inpatient Medications  aMIOdarone    Tablet 200 milliGRAM(s) Oral daily  aspirin  chewable 81 milliGRAM(s) Oral daily  atorvastatin 80 milliGRAM(s) Oral at bedtime  chlorhexidine 0.12% Liquid 15 milliLiter(s) Oral Mucosa every 12 hours  chlorhexidine 2% Cloths 1 Application(s) Topical <User Schedule>  chlorhexidine 4% Liquid 1 Application(s) Topical <User Schedule>  furosemide Infusion 10 mG/Hr IV Continuous <Continuous>  heparin   Injectable 5000 Unit(s) SubCutaneous every 8 hours  insulin lispro (ADMELOG) corrective regimen sliding scale   SubCutaneous every 6 hours  insulin NPH human recombinant 7 Unit(s) SubCutaneous every 6 hours  multivitamin/minerals/iron Oral Solution (CENTRUM) 15 milliLiter(s) Oral daily  pantoprazole  Injectable 40 milliGRAM(s) IV Push daily  senna 2 Tablet(s) Oral at bedtime  thiamine 100 milliGRAM(s) Enteral Tube daily    PRN Inpatient Medications  acetaminophen    Suspension .. 650 milliGRAM(s) Oral every 6 hours PRN  sodium chloride 0.9% lock flush 10 milliLiter(s) IV Push every 1 hour PRN      REVIEW OF SYSTEMS  --------------------------------------------------------------------------------  Unable to obtain    VITALS/PHYSICAL EXAM  --------------------------------------------------------------------------------  T(C): 36.8 (12-24-22 @ 11:00), Max: 36.8 (12-24-22 @ 11:00)  HR: 102 (12-24-22 @ 11:00) (94 - 109)  BP: 105/59 (12-24-22 @ 11:00) (105/59 - 105/59)  RR: 27 (12-24-22 @ 11:00) (16 - 44)  SpO2: 100% (12-24-22 @ 11:00) (94% - 100%)  Wt(kg): --        12-23-22 @ 07:01  -  12-24-22 @ 07:00  --------------------------------------------------------  IN: 1324 mL / OUT: 2421 mL / NET: -1097 mL    12-24-22 @ 07:01  -  12-24-22 @ 12:12  --------------------------------------------------------  IN: 400 mL / OUT: 70 mL / NET: 330 mL      Physical Exam:  	Gen: ill appearing male  	HEENT: Anicteric  	Pulm:  trach+  	CV: S1S2+  	Abd: Soft, +BS       	Ext: + LE edema B/L  	Neuro: Awake  	Skin: Warm and dry             Acces: CARMELO non tunneled HD catheter      LABS/STUDIES  --------------------------------------------------------------------------------              8.2    12.07 >-----------<  88       [12-24-22 @ 00:43]              27.1     137  |  98  |  48  ----------------------------<  189      [12-24-22 @ 00:43]  4.4   |  26  |  3.22        Ca     8.3     [12-24-22 @ 00:43]      Mg     2.5     [12-24-22 @ 00:43]      Phos  4.3     [12-24-22 @ 00:43]    TPro  6.8  /  Alb  3.3  /  TBili  0.8  /  DBili  x   /  AST  32  /  ALT  27  /  AlkPhos  94  [12-24-22 @ 00:43]          Creatinine Trend:  SCr 3.22 [12-24 @ 00:43]  SCr 4.21 [12-23 @ 01:22]  SCr 2.74 [12-22 @ 00:13]  SCr 2.37 [12-21 @ 00:38]  SCr 2.25 [12-20 @ 00:52]    Urinalysis - [12-16-22 @ 04:28]      Color DK BROWN / Appearance Turbid / SG 1.029 / pH 6.0      Gluc Trace / Ketone Negative  / Bili Negative / Urobili 6 mg/dL       Blood Large / Protein 300 mg/dL / Leuk Est Large / Nitrite Negative       /  / Hyaline 85 / Gran  / Sq Epi  / Non Sq Epi 8 / Bacteria Moderate      TSH 2.84      [12-10-22 @ 15:55]  Lipid: chol --, , HDL --, LDL --      [12-05-22 @ 00:50]    HBsAb 889.7      [12-20-22 @ 11:25]  HBcAb Reactive      [12-20-22 @ 11:25]  HCV 0.15, Nonreact      [12-20-22 @ 11:25]     [facility] DIVISION OF KIDNEY DISEASES AND HYPERTENSION -- FOLLOW UP NOTE  --------------------------------------------------------------------------------  Chief Complaint: Acute pancreatitis without infection or necrosis    HPI:  63 y/o male with PMH of CABG, DM, HTN, HLD presenting as transfer from [place] for c/f STEMI. Course c/b gallstone pancreatitis leading to ARDS and shock & cardiac arrest now on [facility] ECMO. Had significant troponin elevation and his LVEF down to 8%. Pt was deemed to be too unstable to have an angiogram and potential PCI due to his co-morbidities & a new subdural bleed. Pt being seen for ERIC. SCr on arrival was 2.15; trended down to hector 1.6 on 11/25 and eventually increased to 3.95 11/28. Pt received IV diuretics as per CTU. Pt initiated on CRRT on 12/5/22 to optimize volume status and electrolytes.  Pt underwent decannulation of ECMO on 12/8/22. Pt was restarted on 12/9/22 for volume overload. Pt underwent mitral clip OR (12/16/22).  s/p trach on 12/16     HPI: Pt. seen this AM. Tolerated HD yesterday. Now started on Lasix gtt and given Metolazone.       PAST HISTORY  --------------------------------------------------------------------------------  No significant changes to PMH, PSH, FHx, SHx, unless otherwise noted    ALLERGIES & MEDICATIONS  --------------------------------------------------------------------------------  Allergies    No Known Allergies    Intolerances      Standing Inpatient Medications  aMIOdarone    Tablet 200 milliGRAM(s) Oral daily  aspirin  chewable 81 milliGRAM(s) Oral daily  atorvastatin 80 milliGRAM(s) Oral at bedtime  chlorhexidine 0.12% Liquid 15 milliLiter(s) Oral Mucosa every 12 hours  chlorhexidine 2% Cloths 1 Application(s) Topical <User Schedule>  chlorhexidine 4% Liquid 1 Application(s) Topical <User Schedule>  furosemide Infusion 10 mG/Hr IV Continuous <Continuous>  heparin   Injectable 5000 Unit(s) SubCutaneous every 8 hours  insulin lispro (ADMELOG) corrective regimen sliding scale   SubCutaneous every 6 hours  insulin NPH human recombinant 7 Unit(s) SubCutaneous every 6 hours  multivitamin/minerals/iron Oral Solution (CENTRUM) 15 milliLiter(s) Oral daily  pantoprazole  Injectable 40 milliGRAM(s) IV Push daily  senna 2 Tablet(s) Oral at bedtime  thiamine 100 milliGRAM(s) Enteral Tube daily    PRN Inpatient Medications  acetaminophen    Suspension .. 650 milliGRAM(s) Oral every 6 hours PRN  sodium chloride 0.9% lock flush 10 milliLiter(s) IV Push every 1 hour PRN      REVIEW OF SYSTEMS  --------------------------------------------------------------------------------  Unable to obtain    VITALS/PHYSICAL EXAM  --------------------------------------------------------------------------------  T(C): 36.8 (12-24-22 @ 11:00), Max: 36.8 (12-24-22 @ 11:00)  HR: 102 (12-24-22 @ 11:00) (94 - 109)  BP: 105/59 (12-24-22 @ 11:00) (105/59 - 105/59)  RR: 27 (12-24-22 @ 11:00) (16 - 44)  SpO2: 100% (12-24-22 @ 11:00) (94% - 100%)  Wt(kg): --        12-23-22 @ 07:01  -  12-24-22 @ 07:00  --------------------------------------------------------  IN: 1324 mL / OUT: 2421 mL / NET: -1097 mL    12-24-22 @ 07:01  -  12-24-22 @ 12:12  --------------------------------------------------------  IN: 400 mL / OUT: 70 mL / NET: 330 mL      Physical Exam:  	Gen: ill appearing male  	HEENT: Anicteric  	Pulm:  trach+  	CV: S1S2+  	Abd: Soft, +BS       	Ext: + LE edema B/L  	Neuro: Awake  	Skin: Warm and dry             Acces: CARMELO non tunneled HD catheter      LABS/STUDIES  --------------------------------------------------------------------------------              8.2    12.07 >-----------<  88       [12-24-22 @ 00:43]              27.1     137  |  98  |  48  ----------------------------<  189      [12-24-22 @ 00:43]  4.4   |  26  |  3.22        Ca     8.3     [12-24-22 @ 00:43]      Mg     2.5     [12-24-22 @ 00:43]      Phos  4.3     [12-24-22 @ 00:43]    TPro  6.8  /  Alb  3.3  /  TBili  0.8  /  DBili  x   /  AST  32  /  ALT  27  /  AlkPhos  94  [12-24-22 @ 00:43]          Creatinine Trend:  SCr 3.22 [12-24 @ 00:43]  SCr 4.21 [12-23 @ 01:22]  SCr 2.74 [12-22 @ 00:13]  SCr 2.37 [12-21 @ 00:38]  SCr 2.25 [12-20 @ 00:52]    Urinalysis - [12-16-22 @ 04:28]      Color DK BROWN / Appearance Turbid / SG 1.029 / pH 6.0      Gluc Trace / Ketone Negative  / Bili Negative / Urobili 6 mg/dL       Blood Large / Protein 300 mg/dL / Leuk Est Large / Nitrite Negative       /  / Hyaline 85 / Gran  / Sq Epi  / Non Sq Epi 8 / Bacteria Moderate      TSH 2.84      [12-10-22 @ 15:55]  Lipid: chol --, , HDL --, LDL --      [12-05-22 @ 00:50]    HBsAb 889.7      [12-20-22 @ 11:25]  HBcAb Reactive      [12-20-22 @ 11:25]  HCV 0.15, Nonreact      [12-20-22 @ 11:25]     [facility] DIVISION OF KIDNEY DISEASES AND HYPERTENSION -- FOLLOW UP NOTE  --------------------------------------------------------------------------------  Chief Complaint: Acute pancreatitis without infection or necrosis    HPI:  63 y/o male with PMH of CABG, DM, HTN, HLD presenting as transfer from [place] for c/f STEMI. Course c/b gallstone pancreatitis leading to ARDS and shock & cardiac arrest now on [facility] ECMO. Had significant troponin elevation and his LVEF down to 8%. Pt was deemed to be too unstable to have an angiogram and potential PCI due to his co-morbidities & a new subdural bleed. Pt being seen for ERIC. SCr on arrival was 2.15; trended down to hector 1.6 on 11/25 and eventually increased to 3.95 11/28. Pt received IV diuretics as per CTU. Pt initiated on CRRT on 12/5/22 to optimize volume status and electrolytes.  Pt underwent decannulation of ECMO on 12/8/22. Pt was restarted on 12/9/22 for volume overload. Pt underwent mitral clip OR (12/16/22).  s/p trach on 12/16     HPI: Pt. seen this AM. Tolerated HD yesterday. Now started on Lasix gtt and given Metolazone.       PAST HISTORY  --------------------------------------------------------------------------------  No significant changes to PMH, PSH, FHx, SHx, unless otherwise noted    ALLERGIES & MEDICATIONS  --------------------------------------------------------------------------------  Allergies    No Known Allergies    Intolerances      Standing Inpatient Medications  aMIOdarone    Tablet 200 milliGRAM(s) Oral daily  aspirin  chewable 81 milliGRAM(s) Oral daily  atorvastatin 80 milliGRAM(s) Oral at bedtime  chlorhexidine 0.12% Liquid 15 milliLiter(s) Oral Mucosa every 12 hours  chlorhexidine 2% Cloths 1 Application(s) Topical <User Schedule>  chlorhexidine 4% Liquid 1 Application(s) Topical <User Schedule>  furosemide Infusion 10 mG/Hr IV Continuous <Continuous>  heparin   Injectable 5000 Unit(s) SubCutaneous every 8 hours  insulin lispro (ADMELOG) corrective regimen sliding scale   SubCutaneous every 6 hours  insulin NPH human recombinant 7 Unit(s) SubCutaneous every 6 hours  multivitamin/minerals/iron Oral Solution (CENTRUM) 15 milliLiter(s) Oral daily  pantoprazole  Injectable 40 milliGRAM(s) IV Push daily  senna 2 Tablet(s) Oral at bedtime  thiamine 100 milliGRAM(s) Enteral Tube daily    PRN Inpatient Medications  acetaminophen    Suspension .. 650 milliGRAM(s) Oral every 6 hours PRN  sodium chloride 0.9% lock flush 10 milliLiter(s) IV Push every 1 hour PRN      REVIEW OF SYSTEMS  --------------------------------------------------------------------------------  Unable to obtain    VITALS/PHYSICAL EXAM  --------------------------------------------------------------------------------  T(C): 36.8 (12-24-22 @ 11:00), Max: 36.8 (12-24-22 @ 11:00)  HR: 102 (12-24-22 @ 11:00) (94 - 109)  BP: 105/59 (12-24-22 @ 11:00) (105/59 - 105/59)  RR: 27 (12-24-22 @ 11:00) (16 - 44)  SpO2: 100% (12-24-22 @ 11:00) (94% - 100%)  Wt(kg): --        12-23-22 @ 07:01  -  12-24-22 @ 07:00  --------------------------------------------------------  IN: 1324 mL / OUT: 2421 mL / NET: -1097 mL    12-24-22 @ 07:01  -  12-24-22 @ 12:12  --------------------------------------------------------  IN: 400 mL / OUT: 70 mL / NET: 330 mL      Physical Exam:  	Gen: ill appearing male  	HEENT: Anicteric  	Pulm:  trach+  	CV: S1S2+  	Abd: Soft, +BS       	Ext: + LE edema B/L  	Neuro: Awake  	Skin: Warm and dry             Acces: CARMELO non tunneled HD catheter      LABS/STUDIES  --------------------------------------------------------------------------------              8.2    12.07 >-----------<  88       [12-24-22 @ 00:43]              27.1     137  |  98  |  48  ----------------------------<  189      [12-24-22 @ 00:43]  4.4   |  26  |  3.22        Ca     8.3     [12-24-22 @ 00:43]      Mg     2.5     [12-24-22 @ 00:43]      Phos  4.3     [12-24-22 @ 00:43]    TPro  6.8  /  Alb  3.3  /  TBili  0.8  /  DBili  x   /  AST  32  /  ALT  27  /  AlkPhos  94  [12-24-22 @ 00:43]          Creatinine Trend:  SCr 3.22 [12-24 @ 00:43]  SCr 4.21 [12-23 @ 01:22]  SCr 2.74 [12-22 @ 00:13]  SCr 2.37 [12-21 @ 00:38]  SCr 2.25 [12-20 @ 00:52]    Urinalysis - [12-16-22 @ 04:28]      Color DK BROWN / Appearance Turbid / SG 1.029 / pH 6.0      Gluc Trace / Ketone Negative  / Bili Negative / Urobili 6 mg/dL       Blood Large / Protein 300 mg/dL / Leuk Est Large / Nitrite Negative       /  / Hyaline 85 / Gran  / Sq Epi  / Non Sq Epi 8 / Bacteria Moderate      TSH 2.84      [12-10-22 @ 15:55]  Lipid: chol --, , HDL --, LDL --      [12-05-22 @ 00:50]    HBsAb 889.7      [12-20-22 @ 11:25]  HBcAb Reactive      [12-20-22 @ 11:25]  HCV 0.15, Nonreact      [12-20-22 @ 11:25]

## 2022-12-24 NOTE — PROGRESS NOTE ADULT - PROBLEM SELECTOR PLAN 1
Pt with non oliguric ERIC/ ATN in the setting of STEMI, cardiac arrest & cardiogenic shock  SCr on arrival was 2.15; trended down to hector 1.6 on 11/25; slowly trended up & increased to 3.95 11/28.   Pt received IV diuretics. SCr increased to 4.19 with BUN of 101 on 12/5/22. Pt with significant volume overload. Pt initiated on CRRT/CVVHDF to optimize volume and electrolytes on 12/5/22. Pt likely with ATN. Pt underwent decannulation of ECMO on 12/8/22 and was taken off CRRT. Pt reinitiated on CRRT overnight on 12/9/22 for volume overload. s/p trach on 12/16. CRRT stopped again 12/19. Bladder scan daily. Now with [facility]. On Lasix gtt w/ 10mg of Metolazone. Monitor labs and urine output. Avoid any potential nephrotoxins. Dose medications as per HD.    #Anemia  Please check TSAT, may benefit from IV Iron if low.     If you have any questions, please feel free to contact me  Arsalan Cochran  Nephrology Fellow  [phone]; Prefer [employer] TEAMS  (After 5pm or on weekends please page the on-call fellow) Pt with non oliguric ERIC/ ATN in the setting of STEMI, cardiac arrest & cardiogenic shock  SCr on arrival was 2.15; trended down to hector 1.6 on 11/25; slowly trended up & increased to 3.95 11/28.   Pt received IV diuretics. SCr increased to 4.19 with BUN of 101 on 12/5/22. Pt with significant volume overload. Pt initiated on CRRT/CVVHDF to optimize volume and electrolytes on 12/5/22. Pt likely with ATN. Pt underwent decannulation of ECMO on 12/8/22 and was taken off CRRT. Pt reinitiated on CRRT overnight on 12/9/22 for volume overload. s/p trach on 12/16. CRRT stopped again 12/19. Bladder scan daily. Now with De Dios. On Lasix gtt w/ 10mg of Metolazone. Monitor labs and urine output. Avoid any potential nephrotoxins. Dose medications as per HD.    #Anemia  Please check TSAT, may benefit from IV Iron if low.     If you have any questions, please feel free to contact me  Arsalan Cochran  Nephrology Fellow  [phone]; Prefer [employer] TEAMS  (After 5pm or on weekends please page the on-call fellow) Pt with non oliguric ERIC/ ATN in the setting of STEMI, cardiac arrest & cardiogenic shock  SCr on arrival was 2.15; trended down to hector 1.6 on 11/25; slowly trended up & increased to 3.95 11/28.   Pt received IV diuretics. SCr increased to 4.19 with BUN of 101 on 12/5/22. Pt with significant volume overload. Pt initiated on CRRT/CVVHDF to optimize volume and electrolytes on 12/5/22. Pt likely with ATN. Pt underwent decannulation of ECMO on 12/8/22 and was taken off CRRT. Pt reinitiated on CRRT overnight on 12/9/22 for volume overload. s/p trach on 12/16. CRRT stopped again 12/19. Bladder scan daily. Now with De Dios. On Lasix gtt w/ 10mg of Metolazone. Monitor labs and urine output. Avoid any potential nephrotoxins. Dose medications as per HD.    #Anemia  Please check TSAT, may benefit from IV Iron if low.     If you have any questions, please feel free to contact me  Arsalan Cochran  Nephrology Fellow  [phone]; Prefer [employer] TEAMS  (After 5pm or on weekends please page the on-call fellow)

## 2022-12-24 NOTE — PROGRESS NOTE ADULT - ATTENDING COMMENTS
Isabella Strauss MD  Office : [phone]  Contact me on [employer] Teams. Isabella Strauss MD  Office : [phone]  Contact me on [employer] Teams. Isabella Strauss MD  Office : [phone]  Contact me on [employer] Teams.

## 2022-12-24 NOTE — PROGRESS NOTE ADULT - SUBJECTIVE AND OBJECTIVE BOX
Patient seen and examined at the bedside.    Remained critically ill on continuous ICU monitoring.      Brief Summary:  61 y/o male with PMH of CABG, DM, HTN, HLD presenting as transfer from [place] for c/f STEMI.       24 Hour events:      OBJECTIVE:  Vital Signs Last 24 Hrs  T(C): 36 (24 Dec 2022 00:00), Max: 36.9 (23 Dec 2022 08:00)  T(F): 96.8 (24 Dec 2022 00:00), Max: 98.5 (23 Dec 2022 08:00)  HR: 96 (24 Dec 2022 03:45) (94 - 111)  BP: --  BP(mean): --  RR: 17 (24 Dec 2022 03:45) (16 - 44)  SpO2: 100% (24 Dec 2022 03:45) (73% - 100%)    Parameters below as of 24 Dec 2022 00:00  Patient On (Oxygen Delivery Method): tracheostomy collar    O2 Concentration (%): 40    Mode: SIMV (Synchronized Intermittent Mandatory Ventilation)  RR (machine): 12  TV (machine): 500  FiO2: 50  PEEP: 5  PS: 15  ITime: 1  MAP: 12  PC: 20  PIP: 20              Physical Exam:   General: OOB to chair, no acute distress  Neurology: Following commands  Eyes: Bilateral pupils equal and reactive   ENT/Neck: +Trach, Neck supple  Respiratory: Clear bilaterally   CV: S1S2, Sinus   Abdominal: Soft, NT, ND +BS   Extremities: Warm          -------------------------------------------------------------------------------------------------------------------------------                        8.2    12.07 )-----------( 88       ( 24 Dec 2022 00:43 )             27.1     12-24    137  |  98  |  48<H>  ----------------------------<  189<H>  4.4   |  26  |  3.22<H>    Ca    8.3<L>      24 Dec 2022 00:43  Phos  4.3     12-24  Mg     2.5     12-24    TPro  6.8  /  Alb  3.3  /  TBili  0.8  /  DBili  x   /  AST  32  /  ALT  27  /  AlkPhos  94  12-24    LIVER FUNCTIONS - ( 24 Dec 2022 00:43 )  Alb: 3.3 g/dL / Pro: 6.8 g/dL / ALK PHOS: 94 U/L / ALT: 27 U/L / AST: 32 U/L / GGT: x             ABG - ( 24 Dec 2022 00:30 )  pH, Arterial: 7.48  pH, Blood: x     /  pCO2: 40    /  pO2: 107   / HCO3: 30    / Base Excess: 5.8   /  SaO2: 98.7              ------------------------------------------------------------------------------------------------------------------------------  Assessment:  61 y/o male with PMH of CABG, DM, HTN, HLD presenting as transfer from [place] for c/f STEMI.     Cardiogenic shock s/p VA ECMO decannulated 12/8  Mitral regurgitation s/p Mitral clip x1 12/16/22  Acute respiratory distress/failure s/p Tracheostomy 12/16/22   IABP placed ( IABP dc'ed 12/17)   At high risk for hemodynamic instability  ERIC/Renal failure  Thrombocytopenia  Anemia        Plan:   ***Neuro***  History of SDH  PT ongoing.  Pain control with Tylenol     ***Cardiovascular***  At high risk for hemodynamic instability and cardiac arrhythmias.  TTE 12/19 showed EF 24% with normal RV function  Lopressor for heart rate control as BP allows, held for now   Wean pressors for MAP > 65 mm Hg   PO Amiodarone   ASA /Statin daily      ***Pulmonary***  Postoperative acute respiratory insufficiency - on trach collar   Deep breathing and coughing exercises.  Wean oxygen as able.      ***GI***  Protein calorie malnutrition - Tube feeds via nasal feeding tube  Protonix for stress ulcer prophylaxis   Bowel regimen with senna      ***Renal***  Renal failure - Continue intermittent HD  Hyperkalemia - Medically managed and plan for HD session yesterday   Replete electrolytes as indicated.  Vascular surgery consulted for fistula evaluation.  Assess for permacath   Plan for diuretic challenge likely tomorrow        ***ID***  Off antibiotics  Leukocytosis 14.66 -> 13.24, continue to trend.  Appreciate ID input      ***Endocrine***  Hx of DM2, continue glycemic control w/ ISS and  NPH.      ***Hematology***  Acute blood loss anemia and thrombocytopenia - no transfusion indication currently  SQ Heparin for DVT prophylaxis        Patient requires continuous monitoring with bedside rhythm monitoring, pulse oximetry monitoring, and continuous central venous and arterial pressure monitoring; and intermittent blood gas analysis. Care plan discussed with the ICU care team.   Patient remained critical, at risk for life threatening decompensation.    I have spent 30 minutes providing critical care management to this patient.    By signing my name below, I, Leelajose alberto Avila, attest that this documentation has been prepared under the direction and in the presence of Jade Rosas MD  Electronically signed: Abhijit Huber, 12-24-22 @ 06:10    I, Jade Rosas MD, personally performed the services described in this documentation. all medical record entries made by the scribe were at my direction and in my presence. I have reviewed the chart and agree that the record reflects my personal performance and is accurate and complete  Electronically signed: Jade Rosas MD Patient seen and examined at the bedside.    Remained critically ill on continuous ICU monitoring.      Brief Summary:  61 y/o male with PMH of CABG, DM, HTN, HLD presenting as transfer from [place] for c/f STEMI.       24 Hour events:      OBJECTIVE:  Vital Signs Last 24 Hrs  T(C): 36 (24 Dec 2022 00:00), Max: 36.9 (23 Dec 2022 08:00)  T(F): 96.8 (24 Dec 2022 00:00), Max: 98.5 (23 Dec 2022 08:00)  HR: 96 (24 Dec 2022 03:45) (94 - 111)  BP: --  BP(mean): --  RR: 17 (24 Dec 2022 03:45) (16 - 44)  SpO2: 100% (24 Dec 2022 03:45) (73% - 100%)    Parameters below as of 24 Dec 2022 00:00  Patient On (Oxygen Delivery Method): tracheostomy collar    O2 Concentration (%): 40    Mode: SIMV (Synchronized Intermittent Mandatory Ventilation)  RR (machine): 12  TV (machine): 500  FiO2: 50  PEEP: 5  PS: 15  ITime: 1  MAP: 12  PC: 20  PIP: 20              Physical Exam:   General: OOB to chair, no acute distress  Neurology: Following commands  Eyes: Bilateral pupils equal and reactive   ENT/Neck: +Trach, Neck supple  Respiratory: Clear bilaterally   CV: S1S2, Sinus   Abdominal: Soft, NT, ND +BS   Extremities: Warm          -------------------------------------------------------------------------------------------------------------------------------                        8.2    12.07 )-----------( 88       ( 24 Dec 2022 00:43 )             27.1     12-24    137  |  98  |  48<H>  ----------------------------<  189<H>  4.4   |  26  |  3.22<H>    Ca    8.3<L>      24 Dec 2022 00:43  Phos  4.3     12-24  Mg     2.5     12-24    TPro  6.8  /  Alb  3.3  /  TBili  0.8  /  DBili  x   /  AST  32  /  ALT  27  /  AlkPhos  94  12-24    LIVER FUNCTIONS - ( 24 Dec 2022 00:43 )  Alb: 3.3 g/dL / Pro: 6.8 g/dL / ALK PHOS: 94 U/L / ALT: 27 U/L / AST: 32 U/L / GGT: x             ABG - ( 24 Dec 2022 00:30 )  pH, Arterial: 7.48  pH, Blood: x     /  pCO2: 40    /  pO2: 107   / HCO3: 30    / Base Excess: 5.8   /  SaO2: 98.7              ------------------------------------------------------------------------------------------------------------------------------  Assessment:  61 y/o male with PMH of CABG, DM, HTN, HLD presenting as transfer from [place] for c/f STEMI.     Cardiogenic shock s/p VA ECMO decannulated 12/8  Mitral regurgitation s/p Mitral clip x1 12/16/22  Acute respiratory distress/failure s/p Tracheostomy 12/16/22   IABP placed ( IABP dc'ed 12/17)   At high risk for hemodynamic instability  ERIC/Renal failure  Thrombocytopenia  Anemia        Plan:   ***Neuro***  History of SDH  PT ongoing.  Pain control with Tylenol     ***Cardiovascular***  At high risk for hemodynamic instability and cardiac arrhythmias.  TTE 12/19 showed EF 24% with normal RV function  Lopressor for heart rate control as BP allows, held for now   Wean pressors for MAP > 65 mm Hg   PO Amiodarone   ASA /Statin daily      ***Pulmonary***  Postoperative acute respiratory insufficiency - on trach collar   Deep breathing and coughing exercises.  Wean oxygen as able.      ***GI***  Protein calorie malnutrition - Tube feeds via nasal feeding tube  Protonix for stress ulcer prophylaxis   Bowel regimen with senna      ***Renal***  Renal failure - Continue intermittent HD  Hyperkalemia - Medically managed and plan for HD session yesterday   Replete electrolytes as indicated.  Vascular surgery consulted for fistula evaluation.  Assess for permacath   Plan for diuretic challenge likely tomorrow        ***ID***  Off antibiotics  Leukocytosis 14.66 -> 13.24, continue to trend.  Appreciate ID input      ***Endocrine***  Hx of DM2, continue glycemic control w/ ISS and  NPH.      ***Hematology***  Acute blood loss anemia and thrombocytopenia - no transfusion indication currently  SQ Heparin for DVT prophylaxis        Patient requires continuous monitoring with bedside rhythm monitoring, pulse oximetry monitoring, and continuous central venous and arterial pressure monitoring; and intermittent blood gas analysis. Care plan discussed with the ICU care team.   Patient remained critical, at risk for life threatening decompensation.    I have spent 30 minutes providing critical care management to this patient.    By signing my name below, I, Leelajose alberto Avila, attest that this documentation has been prepared under the direction and in the presence of Jade Rosas MD  Electronically signed: Abhijit Huber, 12-24-22 @ 06:10    I, Jade Rosas MD, personally performed the services described in this documentation. all medical record entries made by the scribe were at my direction and in my presence. I have reviewed the chart and agree that the record reflects my personal performance and is accurate and complete  Electronically signed: Jade Rosas MD Patient seen and examined at the bedside.    Remained critically ill on continuous ICU monitoring.      Brief Summary:  63 y/o male with PMH of CABG, DM, HTN, HLD presenting as transfer from [place] for c/f STEMI.       24 Hour events:      OBJECTIVE:  Vital Signs Last 24 Hrs  T(C): 36 (24 Dec 2022 00:00), Max: 36.9 (23 Dec 2022 08:00)  T(F): 96.8 (24 Dec 2022 00:00), Max: 98.5 (23 Dec 2022 08:00)  HR: 96 (24 Dec 2022 03:45) (94 - 111)  BP: --  BP(mean): --  RR: 17 (24 Dec 2022 03:45) (16 - 44)  SpO2: 100% (24 Dec 2022 03:45) (73% - 100%)    Parameters below as of 24 Dec 2022 00:00  Patient On (Oxygen Delivery Method): tracheostomy collar    O2 Concentration (%): 40    Mode: SIMV (Synchronized Intermittent Mandatory Ventilation)  RR (machine): 12  TV (machine): 500  FiO2: 50  PEEP: 5  PS: 15  ITime: 1  MAP: 12  PC: 20  PIP: 20              Physical Exam:   General: OOB to chair, no acute distress  Neurology: Following commands  Eyes: Bilateral pupils equal and reactive   ENT/Neck: +Trach, Neck supple  Respiratory: Clear bilaterally   CV: S1S2, Sinus   Abdominal: Soft, NT, ND +BS   Extremities: Warm          -------------------------------------------------------------------------------------------------------------------------------                        8.2    12.07 )-----------( 88       ( 24 Dec 2022 00:43 )             27.1     12-24    137  |  98  |  48<H>  ----------------------------<  189<H>  4.4   |  26  |  3.22<H>    Ca    8.3<L>      24 Dec 2022 00:43  Phos  4.3     12-24  Mg     2.5     12-24    TPro  6.8  /  Alb  3.3  /  TBili  0.8  /  DBili  x   /  AST  32  /  ALT  27  /  AlkPhos  94  12-24    LIVER FUNCTIONS - ( 24 Dec 2022 00:43 )  Alb: 3.3 g/dL / Pro: 6.8 g/dL / ALK PHOS: 94 U/L / ALT: 27 U/L / AST: 32 U/L / GGT: x             ABG - ( 24 Dec 2022 00:30 )  pH, Arterial: 7.48  pH, Blood: x     /  pCO2: 40    /  pO2: 107   / HCO3: 30    / Base Excess: 5.8   /  SaO2: 98.7              ------------------------------------------------------------------------------------------------------------------------------  Assessment:  63 y/o male with PMH of CABG, DM, HTN, HLD presenting as transfer from [place] for c/f STEMI.     Cardiogenic shock s/p VA ECMO decannulated 12/8  Mitral regurgitation s/p Mitral clip x1 12/16/22  Acute respiratory distress/failure s/p Tracheostomy 12/16/22   IABP placed ( IABP dc'ed 12/17)   At high risk for hemodynamic instability  ERIC/Renal failure  Thrombocytopenia  Anemia        Plan:   ***Neuro***  History of SDH  PT ongoing.  Pain control with Tylenol     ***Cardiovascular***  At high risk for hemodynamic instability and cardiac arrhythmias.  TTE 12/19 showed EF 24% with normal RV function  Lopressor for heart rate control as BP allows, held for now   Wean pressors for MAP > 65 mm Hg   PO Amiodarone   ASA /Statin daily      ***Pulmonary***  Postoperative acute respiratory insufficiency - on trach collar   Deep breathing and coughing exercises.  Wean oxygen as able.      ***GI***  Protein calorie malnutrition - Tube feeds via nasal feeding tube  Protonix for stress ulcer prophylaxis   Bowel regimen with senna      ***Renal***  Renal failure - Continue intermittent HD  Hyperkalemia - Medically managed and plan for HD session yesterday   Replete electrolytes as indicated.  Vascular surgery consulted for fistula evaluation.  Assess for permacath   Plan for diuretic challenge likely tomorrow        ***ID***  Off antibiotics  Leukocytosis 14.66 -> 13.24, continue to trend.  Appreciate ID input      ***Endocrine***  Hx of DM2, continue glycemic control w/ ISS and  NPH.      ***Hematology***  Acute blood loss anemia and thrombocytopenia - no transfusion indication currently  SQ Heparin for DVT prophylaxis        Patient requires continuous monitoring with bedside rhythm monitoring, pulse oximetry monitoring, and continuous central venous and arterial pressure monitoring; and intermittent blood gas analysis. Care plan discussed with the ICU care team.   Patient remained critical, at risk for life threatening decompensation.    I have spent 30 minutes providing critical care management to this patient.    By signing my name below, I, Leelajose alberto Avila, attest that this documentation has been prepared under the direction and in the presence of Jade Rosas MD  Electronically signed: Abhijit Huber, 12-24-22 @ 06:10    I, Jade Rosas MD, personally performed the services described in this documentation. all medical record entries made by the scribe were at my direction and in my presence. I have reviewed the chart and agree that the record reflects my personal performance and is accurate and complete  Electronically signed: Jade Rosas MD Patient seen and examined at the bedside.    Remained critically ill on continuous ICU monitoring.      Brief Summary:  61 y/o male with PMH of CABG, DM, HTN, HLD presenting as transfer from [place] for c/f STEMI.       24 Hour events:      OBJECTIVE:  Vital Signs Last 24 Hrs  T(C): 36 (24 Dec 2022 00:00), Max: 36.9 (23 Dec 2022 08:00)  T(F): 96.8 (24 Dec 2022 00:00), Max: 98.5 (23 Dec 2022 08:00)  HR: 96 (24 Dec 2022 03:45) (94 - 111)  BP: --  BP(mean): --  RR: 17 (24 Dec 2022 03:45) (16 - 44)  SpO2: 100% (24 Dec 2022 03:45) (73% - 100%)    Parameters below as of 24 Dec 2022 00:00  Patient On (Oxygen Delivery Method): tracheostomy collar    O2 Concentration (%): 40    Mode: SIMV (Synchronized Intermittent Mandatory Ventilation)  RR (machine): 12  TV (machine): 500  FiO2: 50  PEEP: 5  PS: 15  ITime: 1  MAP: 12  PC: 20  PIP: 20              Physical Exam:   General: OOB to chair, no acute distress  Neurology: Following commands  Eyes: Bilateral pupils equal and reactive   ENT/Neck: +Trach, Neck supple  Respiratory: Clear bilaterally   CV: S1S2, Sinus   Abdominal: Soft, NT, ND +BS   Extremities: Warm          -------------------------------------------------------------------------------------------------------------------------------                        8.2    12.07 )-----------( 88       ( 24 Dec 2022 00:43 )             27.1     12-24    137  |  98  |  48<H>  ----------------------------<  189<H>  4.4   |  26  |  3.22<H>    Ca    8.3<L>      24 Dec 2022 00:43  Phos  4.3     12-24  Mg     2.5     12-24    TPro  6.8  /  Alb  3.3  /  TBili  0.8  /  DBili  x   /  AST  32  /  ALT  27  /  AlkPhos  94  12-24    LIVER FUNCTIONS - ( 24 Dec 2022 00:43 )  Alb: 3.3 g/dL / Pro: 6.8 g/dL / ALK PHOS: 94 U/L / ALT: 27 U/L / AST: 32 U/L / GGT: x             ABG - ( 24 Dec 2022 00:30 )  pH, Arterial: 7.48  pH, Blood: x     /  pCO2: 40    /  pO2: 107   / HCO3: 30    / Base Excess: 5.8   /  SaO2: 98.7              ------------------------------------------------------------------------------------------------------------------------------  Assessment:  61 y/o male with PMH of CABG, DM, HTN, HLD presenting as transfer from [place] for c/f STEMI.     Cardiogenic shock s/p VA ECMO decannulated 12/8  Mitral regurgitation s/p Mitral clip x1 12/16/22  Acute respiratory distress/failure s/p Tracheostomy 12/16/22   IABP placed ( IABP dc'ed 12/17)   At high risk for hemodynamic instability  ERIC/Renal failure  Thrombocytopenia  Anemia        Plan:   ***Neuro***  History of SDH  PT ongoing.  Pain control with Tylenol     ***Cardiovascular***  At high risk for hemodynamic instability and cardiac arrhythmias.  TTE 12/19 showed EF 24% with normal RV function  Lopressor for heart rate control as BP allows, held for now   Wean pressors for MAP > 65 mm Hg   PO Amiodarone   ASA /Statin daily      ***Pulmonary***  Postoperative acute respiratory insufficiency - on trach collar   Deep breathing and coughing exercises.  Wean oxygen as able.      ***GI***  Protein calorie malnutrition - Tube feeds via nasal feeding tube  Protonix for stress ulcer prophylaxis   Bowel regimen with senna      ***Renal***  Renal failure - Continue intermittent HD  Hyperkalemia - Medically managed and plan for HD session yesterday   Replete electrolytes as indicated.  Vascular surgery consulted for fistula evaluation.  Assess for permacath   Plan for diuretic challenge likely tomorrow        ***ID***  Off antibiotics  Leukocytosis 14.66 -> 13.24, continue to trend.  Appreciate ID input      ***Endocrine***  Hx of DM2, continue glycemic control w/ ISS and  NPH.      ***Hematology***  Acute blood loss anemia and thrombocytopenia - no transfusion indication currently  SQ Heparin for DVT prophylaxis        Patient requires continuous monitoring with bedside rhythm monitoring, pulse oximetry monitoring, and continuous central venous and arterial pressure monitoring; and intermittent blood gas analysis. Care plan discussed with the ICU care team.   Patient remained critical, at risk for life threatening decompensation.    I have spent 30 minutes providing critical care management to this patient.    By signing my name below, I, Leela Avila, attest that this documentation has been prepared under the direction and in the presence of Jade Rosas MD  Electronically signed: Leela Avila, 12-24-22 @ 06:10    I, Jade Rosas MD, personally performed the services described in this documentation. all medical record entries made by the scribe were at my direction and in my presence. I have reviewed the chart and agree that the record reflects my personal performance and is accurate and complete  Electronically signed: Jade Rosas MD Patient seen and examined at the bedside.    Remained critically ill on continuous ICU monitoring.      Brief Summary:  63 y/o male with PMH of CABG, DM, HTN, HLD presenting as transfer from [place] for c/f STEMI.       24 Hour events:      OBJECTIVE:  Vital Signs Last 24 Hrs  T(C): 36 (24 Dec 2022 00:00), Max: 36.9 (23 Dec 2022 08:00)  T(F): 96.8 (24 Dec 2022 00:00), Max: 98.5 (23 Dec 2022 08:00)  HR: 96 (24 Dec 2022 03:45) (94 - 111)  BP: --  BP(mean): --  RR: 17 (24 Dec 2022 03:45) (16 - 44)  SpO2: 100% (24 Dec 2022 03:45) (73% - 100%)    Parameters below as of 24 Dec 2022 00:00  Patient On (Oxygen Delivery Method): tracheostomy collar    O2 Concentration (%): 40    Mode: SIMV (Synchronized Intermittent Mandatory Ventilation)  RR (machine): 12  TV (machine): 500  FiO2: 50  PEEP: 5  PS: 15  ITime: 1  MAP: 12  PC: 20  PIP: 20              Physical Exam:   General: OOB to chair, no acute distress  Neurology: Following commands  Eyes: Bilateral pupils equal and reactive   ENT/Neck: +Trach, Neck supple  Respiratory: Clear bilaterally   CV: S1S2, Sinus   Abdominal: Soft, NT, ND +BS   Extremities: Warm          -------------------------------------------------------------------------------------------------------------------------------                        8.2    12.07 )-----------( 88       ( 24 Dec 2022 00:43 )             27.1     12-24    137  |  98  |  48<H>  ----------------------------<  189<H>  4.4   |  26  |  3.22<H>    Ca    8.3<L>      24 Dec 2022 00:43  Phos  4.3     12-24  Mg     2.5     12-24    TPro  6.8  /  Alb  3.3  /  TBili  0.8  /  DBili  x   /  AST  32  /  ALT  27  /  AlkPhos  94  12-24    LIVER FUNCTIONS - ( 24 Dec 2022 00:43 )  Alb: 3.3 g/dL / Pro: 6.8 g/dL / ALK PHOS: 94 U/L / ALT: 27 U/L / AST: 32 U/L / GGT: x             ABG - ( 24 Dec 2022 00:30 )  pH, Arterial: 7.48  pH, Blood: x     /  pCO2: 40    /  pO2: 107   / HCO3: 30    / Base Excess: 5.8   /  SaO2: 98.7              ------------------------------------------------------------------------------------------------------------------------------  Assessment:  63 y/o male with PMH of CABG, DM, HTN, HLD presenting as transfer from [place] for c/f STEMI.     Cardiogenic shock s/p VA ECMO decannulated 12/8  Mitral regurgitation s/p Mitral clip x1 12/16/22  Acute respiratory distress/failure s/p Tracheostomy 12/16/22   IABP placed ( IABP dc'ed 12/17)   At high risk for hemodynamic instability  ERIC/Renal failure  Thrombocytopenia  Anemia        Plan:   ***Neuro***  History of SDH  PT ongoing.  Pain control with Tylenol     ***Cardiovascular***  At high risk for hemodynamic instability and cardiac arrhythmias.  TTE 12/19 showed EF 24% with normal RV function  Lopressor for heart rate control as BP allows, held for now   Wean pressors for MAP > 65 mm Hg   PO Amiodarone   ASA /Statin daily      ***Pulmonary***  Postoperative acute respiratory insufficiency - on trach collar   Deep breathing and coughing exercises.  Wean oxygen as able.      ***GI***  Protein calorie malnutrition - Tube feeds via nasal feeding tube  Protonix for stress ulcer prophylaxis   Bowel regimen with senna      ***Renal***  Renal failure - Continue intermittent HD  Hyperkalemia - Medically managed and plan for HD session yesterday   Replete electrolytes as indicated.  Vascular surgery consulted for fistula evaluation.  Assess for permacath   Plan for diuretic challenge likely tomorrow        ***ID***  Off antibiotics  Leukocytosis 14.66 -> 13.24, continue to trend.  Appreciate ID input      ***Endocrine***  Hx of DM2, continue glycemic control w/ ISS and  NPH.      ***Hematology***  Acute blood loss anemia and thrombocytopenia - no transfusion indication currently  SQ Heparin for DVT prophylaxis        Patient requires continuous monitoring with bedside rhythm monitoring, pulse oximetry monitoring, and continuous central venous and arterial pressure monitoring; and intermittent blood gas analysis. Care plan discussed with the ICU care team.   Patient remained critical, at risk for life threatening decompensation.    I have spent 30 minutes providing critical care management to this patient.    By signing my name below, I, Leeal Avila, attest that this documentation has been prepared under the direction and in the presence of Jade Rosas MD  Electronically signed: Leela Avila, 12-24-22 @ 06:10    I, Jade Rosas MD, personally performed the services described in this documentation. all medical record entries made by the scribe were at my direction and in my presence. I have reviewed the chart and agree that the record reflects my personal performance and is accurate and complete  Electronically signed: Jade Rosas MD Patient seen and examined at the bedside.    Remained critically ill on continuous ICU monitoring.      Brief Summary:  63 y/o male with PMH of CABG, DM, HTN, HLD presenting as transfer from [place] for c/f STEMI.       24 Hour events:      OBJECTIVE:  Vital Signs Last 24 Hrs  T(C): 36 (24 Dec 2022 00:00), Max: 36.9 (23 Dec 2022 08:00)  T(F): 96.8 (24 Dec 2022 00:00), Max: 98.5 (23 Dec 2022 08:00)  HR: 96 (24 Dec 2022 03:45) (94 - 111)  BP: --  BP(mean): --  RR: 17 (24 Dec 2022 03:45) (16 - 44)  SpO2: 100% (24 Dec 2022 03:45) (73% - 100%)    Parameters below as of 24 Dec 2022 00:00  Patient On (Oxygen Delivery Method): tracheostomy collar    O2 Concentration (%): 40    Mode: SIMV (Synchronized Intermittent Mandatory Ventilation)  RR (machine): 12  TV (machine): 500  FiO2: 50  PEEP: 5  PS: 15  ITime: 1  MAP: 12  PC: 20  PIP: 20              Physical Exam:   General: OOB to chair, no acute distress  Neurology: Following commands  Eyes: Bilateral pupils equal and reactive   ENT/Neck: +Trach, Neck supple  Respiratory: Clear bilaterally   CV: S1S2, Sinus   Abdominal: Soft, NT, ND +BS   Extremities: Warm          -------------------------------------------------------------------------------------------------------------------------------                        8.2    12.07 )-----------( 88       ( 24 Dec 2022 00:43 )             27.1     12-24    137  |  98  |  48<H>  ----------------------------<  189<H>  4.4   |  26  |  3.22<H>    Ca    8.3<L>      24 Dec 2022 00:43  Phos  4.3     12-24  Mg     2.5     12-24    TPro  6.8  /  Alb  3.3  /  TBili  0.8  /  DBili  x   /  AST  32  /  ALT  27  /  AlkPhos  94  12-24    LIVER FUNCTIONS - ( 24 Dec 2022 00:43 )  Alb: 3.3 g/dL / Pro: 6.8 g/dL / ALK PHOS: 94 U/L / ALT: 27 U/L / AST: 32 U/L / GGT: x             ABG - ( 24 Dec 2022 00:30 )  pH, Arterial: 7.48  pH, Blood: x     /  pCO2: 40    /  pO2: 107   / HCO3: 30    / Base Excess: 5.8   /  SaO2: 98.7              ------------------------------------------------------------------------------------------------------------------------------  Assessment:  63 y/o male with PMH of CABG, DM, HTN, HLD presenting as transfer from [place] for c/f STEMI.     Cardiogenic shock s/p VA ECMO decannulated 12/8  Mitral regurgitation s/p Mitral clip x1 12/16/22  Acute respiratory distress/failure s/p Tracheostomy 12/16/22   IABP placed ( IABP dc'ed 12/17)   At high risk for hemodynamic instability  ERIC/Renal failure  Thrombocytopenia  Anemia        Plan:   ***Neuro***  History of SDH  PT ongoing.  Pain control with Tylenol     ***Cardiovascular***  At high risk for hemodynamic instability and cardiac arrhythmias.  TTE 12/19 showed EF 24% with normal RV function  Lopressor for heart rate control as BP allows, held for now   Wean pressors for MAP > 65 mm Hg   PO Amiodarone   ASA /Statin daily      ***Pulmonary***  Postoperative acute respiratory insufficiency - on trach collar   Deep breathing and coughing exercises.  Wean oxygen as able.      ***GI***  Protein calorie malnutrition - Tube feeds via nasal feeding tube  Protonix for stress ulcer prophylaxis   Bowel regimen with senna      ***Renal***  Renal failure - Continue intermittent HD  Hyperkalemia - Medically managed and plan for HD session yesterday   Replete electrolytes as indicated.  Vascular surgery consulted for fistula evaluation.  Assess for permacath   Plan for diuretic challenge likely tomorrow        ***ID***  Off antibiotics  Leukocytosis 14.66 -> 13.24, continue to trend.  Appreciate ID input      ***Endocrine***  Hx of DM2, continue glycemic control w/ ISS and  NPH.      ***Hematology***  Acute blood loss anemia and thrombocytopenia - no transfusion indication currently  SQ Heparin for DVT prophylaxis        Patient requires continuous monitoring with bedside rhythm monitoring, pulse oximetry monitoring, and continuous central venous and arterial pressure monitoring; and intermittent blood gas analysis. Care plan discussed with the ICU care team.   Patient remained critical, at risk for life threatening decompensation.    I have spent 30 minutes providing critical care management to this patient.    By signing my name below, I, Leela Avila, attest that this documentation has been prepared under the direction and in the presence of Jade Rosas MD  Electronically signed: Leela Avila, 12-24-22 @ 06:10    I, Jade Rosas MD, personally performed the services described in this documentation. all medical record entries made by the scribe were at my direction and in my presence. I have reviewed the chart and agree that the record reflects my personal performance and is accurate and complete  Electronically signed: Jade Rosas MD Patient seen and examined at the bedside.    Remained critically ill on continuous ICU monitoring.      Brief Summary:  61 y/o male with PMH of CABG, DM, HTN, HLD presenting as transfer from [place] for c/f STEMI.       24 Hour events:      OBJECTIVE:  Vital Signs Last 24 Hrs  T(C): 36 (24 Dec 2022 00:00), Max: 36.9 (23 Dec 2022 08:00)  T(F): 96.8 (24 Dec 2022 00:00), Max: 98.5 (23 Dec 2022 08:00)  HR: 96 (24 Dec 2022 03:45) (94 - 111)  BP: --  BP(mean): --  RR: 17 (24 Dec 2022 03:45) (16 - 44)  SpO2: 100% (24 Dec 2022 03:45) (73% - 100%)    Parameters below as of 24 Dec 2022 00:00  Patient On (Oxygen Delivery Method): tracheostomy collar    O2 Concentration (%): 40    Mode: SIMV (Synchronized Intermittent Mandatory Ventilation)  RR (machine): 12  TV (machine): 500  FiO2: 50  PEEP: 5  PS: 15  ITime: 1  MAP: 12  PC: 20  PIP: 20              Physical Exam:   General: OOB to chair, no acute distress  Neurology: Following commands  Eyes: Bilateral pupils equal and reactive   ENT/Neck: +Trach, Neck supple  Respiratory: Clear bilaterally   CV: S1S2, Sinus   Abdominal: Soft, NT, ND +BS   Extremities: Warm          -------------------------------------------------------------------------------------------------------------------------------                        8.2    12.07 )-----------( 88       ( 24 Dec 2022 00:43 )             27.1     12-24    137  |  98  |  48<H>  ----------------------------<  189<H>  4.4   |  26  |  3.22<H>    Ca    8.3<L>      24 Dec 2022 00:43  Phos  4.3     12-24  Mg     2.5     12-24    TPro  6.8  /  Alb  3.3  /  TBili  0.8  /  DBili  x   /  AST  32  /  ALT  27  /  AlkPhos  94  12-24    LIVER FUNCTIONS - ( 24 Dec 2022 00:43 )  Alb: 3.3 g/dL / Pro: 6.8 g/dL / ALK PHOS: 94 U/L / ALT: 27 U/L / AST: 32 U/L / GGT: x             ABG - ( 24 Dec 2022 00:30 )  pH, Arterial: 7.48  pH, Blood: x     /  pCO2: 40    /  pO2: 107   / HCO3: 30    / Base Excess: 5.8   /  SaO2: 98.7              ------------------------------------------------------------------------------------------------------------------------------  Assessment:  61 y/o male with PMH of CABG, DM, HTN, HLD presenting as transfer from [place] for c/f STEMI.     Cardiogenic shock s/p VA ECMO decannulated 12/8  Mitral regurgitation s/p Mitral clip x1 12/16/22  Acute respiratory distress/failure s/p Tracheostomy 12/16/22   IABP placed ( IABP dc'ed 12/17)   At high risk for hemodynamic instability  ERIC/Renal failure  Thrombocytopenia  Anemia        Plan:   ***Neuro***  History of SDH  PT ongoing.  Pain control with Tylenol     ***Cardiovascular***  At high risk for hemodynamic instability and cardiac arrhythmias.  TTE 12/19 showed EF 24% with normal RV function  Lopressor for heart rate control as BP allows, held for now   Wean pressors for MAP > 65 mm Hg   PO Amiodarone   ASA /Statin daily      ***Pulmonary***  Postoperative acute respiratory insufficiency - on trach collar   Deep breathing and coughing exercises.  Wean oxygen as able.      ***GI***  Protein calorie malnutrition - Tube feeds via nasal feeding tube  Protonix for stress ulcer prophylaxis   Bowel regimen with senna      ***Renal***  Renal failure - Continue intermittent HD  Hyperkalemia - Medically managed and plan for HD session yesterday   Replete electrolytes as indicated.  Vascular surgery consulted for fistula evaluation.  Assess for permacath   Plan for diuretic challenge likely tomorrow        ***ID***  Off antibiotics  Leukocytosis WBC: 13.24 -> 12.07, continue to trend.  Appreciate ID input      ***Endocrine***  Hx of DM2, continue glycemic control w/ ISS and  NPH.      ***Hematology***  Acute blood loss anemia and thrombocytopenia - no transfusion indication currently  SQ Heparin for DVT prophylaxis        Patient requires continuous monitoring with bedside rhythm monitoring, pulse oximetry monitoring, and continuous central venous and arterial pressure monitoring; and intermittent blood gas analysis. Care plan discussed with the ICU care team.   Patient remained critical, at risk for life threatening decompensation.    I have spent 30 minutes providing critical care management to this patient.    By signing my name below, I, Leela Avila, attest that this documentation has been prepared under the direction and in the presence of Jade Rosas MD  Electronically signed: Leela Avila, 12-24-22 @ 06:10    I, Jade Rosas MD, personally performed the services described in this documentation. all medical record entries made by the scribe were at my direction and in my presence. I have reviewed the chart and agree that the record reflects my personal performance and is accurate and complete  Electronically signed: Jade Rosas MD Patient seen and examined at the bedside.    Remained critically ill on continuous ICU monitoring.      Brief Summary:  61 y/o male with PMH of CABG, DM, HTN, HLD presenting as transfer from [place] for c/f STEMI.       24 Hour events:      OBJECTIVE:  Vital Signs Last 24 Hrs  T(C): 36 (24 Dec 2022 00:00), Max: 36.9 (23 Dec 2022 08:00)  T(F): 96.8 (24 Dec 2022 00:00), Max: 98.5 (23 Dec 2022 08:00)  HR: 96 (24 Dec 2022 03:45) (94 - 111)  BP: --  BP(mean): --  RR: 17 (24 Dec 2022 03:45) (16 - 44)  SpO2: 100% (24 Dec 2022 03:45) (73% - 100%)    Parameters below as of 24 Dec 2022 00:00  Patient On (Oxygen Delivery Method): tracheostomy collar    O2 Concentration (%): 40    Mode: SIMV (Synchronized Intermittent Mandatory Ventilation)  RR (machine): 12  TV (machine): 500  FiO2: 50  PEEP: 5  PS: 15  ITime: 1  MAP: 12  PC: 20  PIP: 20              Physical Exam:   General: OOB to chair, no acute distress  Neurology: Following commands  Eyes: Bilateral pupils equal and reactive   ENT/Neck: +Trach, Neck supple  Respiratory: Clear bilaterally   CV: S1S2, Sinus   Abdominal: Soft, NT, ND +BS   Extremities: Warm          -------------------------------------------------------------------------------------------------------------------------------                        8.2    12.07 )-----------( 88       ( 24 Dec 2022 00:43 )             27.1     12-24    137  |  98  |  48<H>  ----------------------------<  189<H>  4.4   |  26  |  3.22<H>    Ca    8.3<L>      24 Dec 2022 00:43  Phos  4.3     12-24  Mg     2.5     12-24    TPro  6.8  /  Alb  3.3  /  TBili  0.8  /  DBili  x   /  AST  32  /  ALT  27  /  AlkPhos  94  12-24    LIVER FUNCTIONS - ( 24 Dec 2022 00:43 )  Alb: 3.3 g/dL / Pro: 6.8 g/dL / ALK PHOS: 94 U/L / ALT: 27 U/L / AST: 32 U/L / GGT: x             ABG - ( 24 Dec 2022 00:30 )  pH, Arterial: 7.48  pH, Blood: x     /  pCO2: 40    /  pO2: 107   / HCO3: 30    / Base Excess: 5.8   /  SaO2: 98.7              ------------------------------------------------------------------------------------------------------------------------------  Assessment:  61 y/o male with PMH of CABG, DM, HTN, HLD presenting as transfer from [place] for c/f STEMI.     Cardiogenic shock s/p VA ECMO decannulated 12/8  Mitral regurgitation s/p Mitral clip x1 12/16/22  Acute respiratory distress/failure s/p Tracheostomy 12/16/22   IABP placed ( IABP dc'ed 12/17)   At high risk for hemodynamic instability  ERIC/Renal failure  Thrombocytopenia  Anemia        Plan:   ***Neuro***  History of SDH  PT ongoing.  Pain control with Tylenol     ***Cardiovascular***  At high risk for hemodynamic instability and cardiac arrhythmias.  TTE 12/19 showed EF 24% with normal RV function  Lopressor for heart rate control as BP allows, held for now   Wean pressors for MAP > 65 mm Hg   PO Amiodarone   ASA /Statin daily      ***Pulmonary***  Postoperative acute respiratory insufficiency - on trach collar   Deep breathing and coughing exercises.  Wean oxygen as able.      ***GI***  Protein calorie malnutrition - Tube feeds via nasal feeding tube  Protonix for stress ulcer prophylaxis   Bowel regimen with senna      ***Renal***  Renal failure - Continue intermittent HD  Hyperkalemia - Medically managed and plan for HD session yesterday   Replete electrolytes as indicated.  Vascular surgery consulted for fistula evaluation.  Assess for permacath   Plan for diuretic challenge likely tomorrow        ***ID***  Off antibiotics  Leukocytosis WBC: 13.24 -> 12.07, continue to trend.  Appreciate ID input      ***Endocrine***  Hx of DM2, continue glycemic control w/ ISS and  NPH.      ***Hematology***  Acute blood loss anemia and thrombocytopenia - no transfusion indication currently  SQ Heparin for DVT prophylaxis        Patient requires continuous monitoring with bedside rhythm monitoring, pulse oximetry monitoring, and continuous central venous and arterial pressure monitoring; and intermittent blood gas analysis. Care plan discussed with the ICU care team.   Patient remained critical, at risk for life threatening decompensation.    I have spent 30 minutes providing critical care management to this patient.    By signing my name below, I, Leela Avila, attest that this documentation has been prepared under the direction and in the presence of Jade Rosas MD  Electronically signed: Leela Avila, 12-24-22 @ 06:10    I, Jade Rosas MD, personally performed the services described in this documentation. all medical record entries made by the scribe were at my direction and in my presence. I have reviewed the chart and agree that the record reflects my personal performance and is accurate and complete  Electronically signed: Jade Rosas MD Patient seen and examined at the bedside.    Remained critically ill on continuous ICU monitoring.      Brief Summary:  63 y/o male with PMH of CABG, DM, HTN, HLD presenting as transfer from [place] for c/f STEMI.       24 Hour events:      OBJECTIVE:  Vital Signs Last 24 Hrs  T(C): 36 (24 Dec 2022 00:00), Max: 36.9 (23 Dec 2022 08:00)  T(F): 96.8 (24 Dec 2022 00:00), Max: 98.5 (23 Dec 2022 08:00)  HR: 96 (24 Dec 2022 03:45) (94 - 111)  BP: --  BP(mean): --  RR: 17 (24 Dec 2022 03:45) (16 - 44)  SpO2: 100% (24 Dec 2022 03:45) (73% - 100%)    Parameters below as of 24 Dec 2022 00:00  Patient On (Oxygen Delivery Method): tracheostomy collar    O2 Concentration (%): 40    Mode: SIMV (Synchronized Intermittent Mandatory Ventilation)  RR (machine): 12  TV (machine): 500  FiO2: 50  PEEP: 5  PS: 15  ITime: 1  MAP: 12  PC: 20  PIP: 20              Physical Exam:   General: OOB to chair, no acute distress  Neurology: Following commands  Eyes: Bilateral pupils equal and reactive   ENT/Neck: +Trach, Neck supple  Respiratory: Clear bilaterally   CV: S1S2, Sinus   Abdominal: Soft, NT, ND +BS   Extremities: Warm          -------------------------------------------------------------------------------------------------------------------------------                        8.2    12.07 )-----------( 88       ( 24 Dec 2022 00:43 )             27.1     12-24    137  |  98  |  48<H>  ----------------------------<  189<H>  4.4   |  26  |  3.22<H>    Ca    8.3<L>      24 Dec 2022 00:43  Phos  4.3     12-24  Mg     2.5     12-24    TPro  6.8  /  Alb  3.3  /  TBili  0.8  /  DBili  x   /  AST  32  /  ALT  27  /  AlkPhos  94  12-24    LIVER FUNCTIONS - ( 24 Dec 2022 00:43 )  Alb: 3.3 g/dL / Pro: 6.8 g/dL / ALK PHOS: 94 U/L / ALT: 27 U/L / AST: 32 U/L / GGT: x             ABG - ( 24 Dec 2022 00:30 )  pH, Arterial: 7.48  pH, Blood: x     /  pCO2: 40    /  pO2: 107   / HCO3: 30    / Base Excess: 5.8   /  SaO2: 98.7              ------------------------------------------------------------------------------------------------------------------------------  Assessment:  63 y/o male with PMH of CABG, DM, HTN, HLD presenting as transfer from [place] for c/f STEMI.     Cardiogenic shock s/p VA ECMO decannulated 12/8  Mitral regurgitation s/p Mitral clip x1 12/16/22  Acute respiratory distress/failure s/p Tracheostomy 12/16/22   IABP placed ( IABP dc'ed 12/17)   At high risk for hemodynamic instability  ERIC/Renal failure  Thrombocytopenia  Anemia        Plan:   ***Neuro***  History of SDH  PT ongoing.  Pain control with Tylenol     ***Cardiovascular***  At high risk for hemodynamic instability and cardiac arrhythmias.  TTE 12/19 showed EF 24% with normal RV function  Lopressor for heart rate control as BP allows, held for now   Wean pressors for MAP > 65 mm Hg   PO Amiodarone   ASA /Statin daily      ***Pulmonary***  Postoperative acute respiratory insufficiency - on trach collar   Deep breathing and coughing exercises.  Wean oxygen as able.      ***GI***  Protein calorie malnutrition - Tube feeds via nasal feeding tube  Protonix for stress ulcer prophylaxis   Bowel regimen with senna      ***Renal***  Renal failure - Continue intermittent HD  Hyperkalemia - Medically managed and plan for HD session yesterday   Replete electrolytes as indicated.  Vascular surgery consulted for fistula evaluation.  Assess for permacath   Plan for diuretic challenge likely tomorrow        ***ID***  Off antibiotics  Leukocytosis WBC: 13.24 -> 12.07, continue to trend.  Appreciate ID input      ***Endocrine***  Hx of DM2, continue glycemic control w/ ISS and  NPH.      ***Hematology***  Acute blood loss anemia and thrombocytopenia - no transfusion indication currently  SQ Heparin for DVT prophylaxis        Patient requires continuous monitoring with bedside rhythm monitoring, pulse oximetry monitoring, and continuous central venous and arterial pressure monitoring; and intermittent blood gas analysis. Care plan discussed with the ICU care team.   Patient remained critical, at risk for life threatening decompensation.    I have spent 30 minutes providing critical care management to this patient.    By signing my name below, I, Leela Avila, attest that this documentation has been prepared under the direction and in the presence of Jade Rosas MD  Electronically signed: Leela Avila, 12-24-22 @ 06:10    I, Jade Rosas MD, personally performed the services described in this documentation. all medical record entries made by the scribe were at my direction and in my presence. I have reviewed the chart and agree that the record reflects my personal performance and is accurate and complete  Electronically signed: Jade Rosas MD Patient seen and examined at the bedside.    Remains critically ill on continuous ICU monitoring.      Brief Summary:  61 y/o male with PMH of CABG, DM, HTN, HLD presenting as transfer from [place] with a STEMI.   He was in cardiogenic shock requiring VA ECMO, respiratory failure requiring tracheostomy, and renal failure requiring RRT.      24 Hour events:  No acute events.  Tolerating long trach collar trials.  Beta blocker stopped for hypotension.  HD yesterday.  Plan for diuretic challenge today.      OBJECTIVE:  Vital Signs Last 24 Hrs  T(C): 36 (24 Dec 2022 00:00), Max: 36.9 (23 Dec 2022 08:00)  T(F): 96.8 (24 Dec 2022 00:00), Max: 98.5 (23 Dec 2022 08:00)  HR: 96 (24 Dec 2022 03:45) (94 - 111)  BP: --  BP(mean): --  RR: 17 (24 Dec 2022 03:45) (16 - 44)  SpO2: 100% (24 Dec 2022 03:45) (73% - 100%)    Parameters below as of 24 Dec 2022 00:00  Patient On (Oxygen Delivery Method): tracheostomy collar    O2 Concentration (%): 40    Mode: SIMV (Synchronized Intermittent Mandatory Ventilation)  RR (machine): 12  TV (machine): 500  FiO2: 50  PEEP: 5  PS: 15  ITime: 1  MAP: 12  PC: 20  PIP: 20              Physical Exam:   General: OOB to chair, no acute distress  Neurology: Following commands  Eyes: Bilateral pupils equal and reactive   ENT/Neck: +Trach, Neck supple  Respiratory: Clear bilaterally, on trach collar  CV: S1S2, Sinus   Abdominal: Soft, NT, ND +BS   Extremities: Warm          -------------------------------------------------------------------------------------------------------------------------------                        8.2    12.07 )-----------( 88       ( 24 Dec 2022 00:43 )             27.1     12-24    137  |  98  |  48<H>  ----------------------------<  189<H>  4.4   |  26  |  3.22<H>    Ca    8.3<L>      24 Dec 2022 00:43  Phos  4.3     12-24  Mg     2.5     12-24    TPro  6.8  /  Alb  3.3  /  TBili  0.8  /  DBili  x   /  AST  32  /  ALT  27  /  AlkPhos  94  12-24    LIVER FUNCTIONS - ( 24 Dec 2022 00:43 )  Alb: 3.3 g/dL / Pro: 6.8 g/dL / ALK PHOS: 94 U/L / ALT: 27 U/L / AST: 32 U/L / GGT: x             ABG - ( 24 Dec 2022 00:30 )  pH, Arterial: 7.48  pH, Blood: x     /  pCO2: 40    /  pO2: 107   / HCO3: 30    / Base Excess: 5.8   /  SaO2: 98.7              ------------------------------------------------------------------------------------------------------------------------------  Assessment:  61 y/o male with PMH of CABG, DM, HTN, HLD presenting as transfer from [place] with STEMI.     Cardiogenic shock s/p VA ECMO decannulated 12/8  Mitral regurgitation s/p Mitral clip x1 12/16/22  Acute respiratory distress/failure s/p Tracheostomy 12/16/22   IABP placed ( IABP dc'ed 12/17)   At high risk for hemodynamic instability  ERIC/Renal failure  Thrombocytopenia  Anemia        Plan:   ***Neuro***  History of SDH  PT ongoing.  Pain control with prns  Optimize day/night cycles.    ***Cardiovascular***  At high risk for hemodynamic instability and cardiac arrhythmias.  TTE 12/19 showed EF 24% with normal RV function  Lopressor on hold. Will use Digoxin if needed for rate control.  PO Amiodarone   ASA /Statin daily    ***Pulmonary***  Postoperative acute respiratory insufficiency - extending trach collar trials.  Deep breathing and coughing exercises.  Wean oxygen as able.    ***GI***  Protein calorie malnutrition - Tube feeds via nasal feeding tube  Protonix for stress ulcer prophylaxis   Bowel regimen with senna    ***Renal***  Renal failure - Continue intermittent HD  Diuretic challenge today - Monitor urine output on Lasix infusion.  Replete electrolytes as indicated.  May need a permacath.    ***ID***  Off antibiotics  Trend WBC count    ***Endocrine***  Hx of DM2, continue glycemic control w/ ISS and NPH.    ***Hematology***  Acute blood loss anemia and thrombocytopenia - no transfusion indication currently  SQ Heparin for DVT prophylaxis        Patient requires continuous monitoring with bedside rhythm monitoring, pulse oximetry monitoring, and continuous central venous and arterial pressure monitoring; and intermittent blood gas analysis. Care plan discussed with the ICU care team.   Patient remained critical, at risk for life threatening decompensation.    I have spent 35 minutes providing critical care management to this patient.    By signing my name below, I, Leela Avila, attest that this documentation has been prepared under the direction and in the presence of Jade Rosas MD  Electronically signed: Leela Avila, 12-24-22 @ 06:10    I, Jade Rosas MD, personally performed the services described in this documentation. all medical record entries made by the scribe were at my direction and in my presence. I have reviewed the chart and agree that the record reflects my personal performance and is accurate and complete  Electronically signed: Jade Rosas MD Patient seen and examined at the bedside.    Remains critically ill on continuous ICU monitoring.      Brief Summary:  63 y/o male with PMH of CABG, DM, HTN, HLD presenting as transfer from [place] with a STEMI.   He was in cardiogenic shock requiring VA ECMO, respiratory failure requiring tracheostomy, and renal failure requiring RRT.      24 Hour events:  No acute events.  Tolerating long trach collar trials.  Beta blocker stopped for hypotension.  HD yesterday.  Plan for diuretic challenge today.      OBJECTIVE:  Vital Signs Last 24 Hrs  T(C): 36 (24 Dec 2022 00:00), Max: 36.9 (23 Dec 2022 08:00)  T(F): 96.8 (24 Dec 2022 00:00), Max: 98.5 (23 Dec 2022 08:00)  HR: 96 (24 Dec 2022 03:45) (94 - 111)  BP: --  BP(mean): --  RR: 17 (24 Dec 2022 03:45) (16 - 44)  SpO2: 100% (24 Dec 2022 03:45) (73% - 100%)    Parameters below as of 24 Dec 2022 00:00  Patient On (Oxygen Delivery Method): tracheostomy collar    O2 Concentration (%): 40    Mode: SIMV (Synchronized Intermittent Mandatory Ventilation)  RR (machine): 12  TV (machine): 500  FiO2: 50  PEEP: 5  PS: 15  ITime: 1  MAP: 12  PC: 20  PIP: 20              Physical Exam:   General: OOB to chair, no acute distress  Neurology: Following commands  Eyes: Bilateral pupils equal and reactive   ENT/Neck: +Trach, Neck supple  Respiratory: Clear bilaterally, on trach collar  CV: S1S2, Sinus   Abdominal: Soft, NT, ND +BS   Extremities: Warm          -------------------------------------------------------------------------------------------------------------------------------                        8.2    12.07 )-----------( 88       ( 24 Dec 2022 00:43 )             27.1     12-24    137  |  98  |  48<H>  ----------------------------<  189<H>  4.4   |  26  |  3.22<H>    Ca    8.3<L>      24 Dec 2022 00:43  Phos  4.3     12-24  Mg     2.5     12-24    TPro  6.8  /  Alb  3.3  /  TBili  0.8  /  DBili  x   /  AST  32  /  ALT  27  /  AlkPhos  94  12-24    LIVER FUNCTIONS - ( 24 Dec 2022 00:43 )  Alb: 3.3 g/dL / Pro: 6.8 g/dL / ALK PHOS: 94 U/L / ALT: 27 U/L / AST: 32 U/L / GGT: x             ABG - ( 24 Dec 2022 00:30 )  pH, Arterial: 7.48  pH, Blood: x     /  pCO2: 40    /  pO2: 107   / HCO3: 30    / Base Excess: 5.8   /  SaO2: 98.7              ------------------------------------------------------------------------------------------------------------------------------  Assessment:  63 y/o male with PMH of CABG, DM, HTN, HLD presenting as transfer from [place] with STEMI.     Cardiogenic shock s/p VA ECMO decannulated 12/8  Mitral regurgitation s/p Mitral clip x1 12/16/22  Acute respiratory distress/failure s/p Tracheostomy 12/16/22   IABP placed ( IABP dc'ed 12/17)   At high risk for hemodynamic instability  ERIC/Renal failure  Thrombocytopenia  Anemia        Plan:   ***Neuro***  History of SDH  PT ongoing.  Pain control with prns  Optimize day/night cycles.    ***Cardiovascular***  At high risk for hemodynamic instability and cardiac arrhythmias.  TTE 12/19 showed EF 24% with normal RV function  Lopressor on hold. Will use Digoxin if needed for rate control.  PO Amiodarone   ASA /Statin daily    ***Pulmonary***  Postoperative acute respiratory insufficiency - extending trach collar trials.  Deep breathing and coughing exercises.  Wean oxygen as able.    ***GI***  Protein calorie malnutrition - Tube feeds via nasal feeding tube  Protonix for stress ulcer prophylaxis   Bowel regimen with senna    ***Renal***  Renal failure - Continue intermittent HD  Diuretic challenge today - Monitor urine output on Lasix infusion.  Replete electrolytes as indicated.  May need a permacath.    ***ID***  Off antibiotics  Trend WBC count    ***Endocrine***  Hx of DM2, continue glycemic control w/ ISS and NPH.    ***Hematology***  Acute blood loss anemia and thrombocytopenia - no transfusion indication currently  SQ Heparin for DVT prophylaxis        Patient requires continuous monitoring with bedside rhythm monitoring, pulse oximetry monitoring, and continuous central venous and arterial pressure monitoring; and intermittent blood gas analysis. Care plan discussed with the ICU care team.   Patient remained critical, at risk for life threatening decompensation.    I have spent 35 minutes providing critical care management to this patient.    By signing my name below, I, Leela Avila, attest that this documentation has been prepared under the direction and in the presence of Jade Rosas MD  Electronically signed: Leeal Avila, 12-24-22 @ 06:10    I, Jade Rosas MD, personally performed the services described in this documentation. all medical record entries made by the scribe were at my direction and in my presence. I have reviewed the chart and agree that the record reflects my personal performance and is accurate and complete  Electronically signed: Jade Rosas MD Patient seen and examined at the bedside.    Remains critically ill on continuous ICU monitoring.      Brief Summary:  61 y/o male with PMH of CABG, DM, HTN, HLD presenting as transfer from [place] with a STEMI.   He was in cardiogenic shock requiring VA ECMO, respiratory failure requiring tracheostomy, and renal failure requiring RRT.      24 Hour events:  No acute events.  Tolerating long trach collar trials.  Beta blocker stopped for hypotension.  HD yesterday.  Plan for diuretic challenge today.      OBJECTIVE:  Vital Signs Last 24 Hrs  T(C): 36 (24 Dec 2022 00:00), Max: 36.9 (23 Dec 2022 08:00)  T(F): 96.8 (24 Dec 2022 00:00), Max: 98.5 (23 Dec 2022 08:00)  HR: 96 (24 Dec 2022 03:45) (94 - 111)  BP: --  BP(mean): --  RR: 17 (24 Dec 2022 03:45) (16 - 44)  SpO2: 100% (24 Dec 2022 03:45) (73% - 100%)    Parameters below as of 24 Dec 2022 00:00  Patient On (Oxygen Delivery Method): tracheostomy collar    O2 Concentration (%): 40    Mode: SIMV (Synchronized Intermittent Mandatory Ventilation)  RR (machine): 12  TV (machine): 500  FiO2: 50  PEEP: 5  PS: 15  ITime: 1  MAP: 12  PC: 20  PIP: 20              Physical Exam:   General: OOB to chair, no acute distress  Neurology: Following commands  Eyes: Bilateral pupils equal and reactive   ENT/Neck: +Trach, Neck supple  Respiratory: Clear bilaterally, on trach collar  CV: S1S2, Sinus   Abdominal: Soft, NT, ND +BS   Extremities: Warm          -------------------------------------------------------------------------------------------------------------------------------                        8.2    12.07 )-----------( 88       ( 24 Dec 2022 00:43 )             27.1     12-24    137  |  98  |  48<H>  ----------------------------<  189<H>  4.4   |  26  |  3.22<H>    Ca    8.3<L>      24 Dec 2022 00:43  Phos  4.3     12-24  Mg     2.5     12-24    TPro  6.8  /  Alb  3.3  /  TBili  0.8  /  DBili  x   /  AST  32  /  ALT  27  /  AlkPhos  94  12-24    LIVER FUNCTIONS - ( 24 Dec 2022 00:43 )  Alb: 3.3 g/dL / Pro: 6.8 g/dL / ALK PHOS: 94 U/L / ALT: 27 U/L / AST: 32 U/L / GGT: x             ABG - ( 24 Dec 2022 00:30 )  pH, Arterial: 7.48  pH, Blood: x     /  pCO2: 40    /  pO2: 107   / HCO3: 30    / Base Excess: 5.8   /  SaO2: 98.7              ------------------------------------------------------------------------------------------------------------------------------  Assessment:  61 y/o male with PMH of CABG, DM, HTN, HLD presenting as transfer from [place] with STEMI.     Cardiogenic shock s/p VA ECMO decannulated 12/8  Mitral regurgitation s/p Mitral clip x1 12/16/22  Acute respiratory distress/failure s/p Tracheostomy 12/16/22   IABP placed ( IABP dc'ed 12/17)   At high risk for hemodynamic instability  ERIC/Renal failure  Thrombocytopenia  Anemia        Plan:   ***Neuro***  History of SDH  PT ongoing.  Pain control with prns  Optimize day/night cycles.    ***Cardiovascular***  At high risk for hemodynamic instability and cardiac arrhythmias.  TTE 12/19 showed EF 24% with normal RV function  Lopressor on hold. Will use Digoxin if needed for rate control.  PO Amiodarone   ASA /Statin daily    ***Pulmonary***  Postoperative acute respiratory insufficiency - extending trach collar trials.  Deep breathing and coughing exercises.  Wean oxygen as able.    ***GI***  Protein calorie malnutrition - Tube feeds via nasal feeding tube  Protonix for stress ulcer prophylaxis   Bowel regimen with senna    ***Renal***  Renal failure - Continue intermittent HD  Diuretic challenge today - Monitor urine output on Lasix infusion.  Replete electrolytes as indicated.  May need a permacath.    ***ID***  Off antibiotics  Trend WBC count    ***Endocrine***  Hx of DM2, continue glycemic control w/ ISS and NPH.    ***Hematology***  Acute blood loss anemia and thrombocytopenia - no transfusion indication currently  SQ Heparin for DVT prophylaxis        Patient requires continuous monitoring with bedside rhythm monitoring, pulse oximetry monitoring, and continuous central venous and arterial pressure monitoring; and intermittent blood gas analysis. Care plan discussed with the ICU care team.   Patient remained critical, at risk for life threatening decompensation.    I have spent 35 minutes providing critical care management to this patient.    By signing my name below, I, Leela Avila, attest that this documentation has been prepared under the direction and in the presence of Jade Rosas MD  Electronically signed: Leela Avila, 12-24-22 @ 06:10    I, Jade Rosas MD, personally performed the services described in this documentation. all medical record entries made by the scribe were at my direction and in my presence. I have reviewed the chart and agree that the record reflects my personal performance and is accurate and complete  Electronically signed: Jade Rosas MD

## 2022-12-25 LAB
ALBUMIN SERPL ELPH-MCNC: 3.3 G/DL — SIGNIFICANT CHANGE UP (ref 3.3–5)
ALP SERPL-CCNC: 92 U/L — SIGNIFICANT CHANGE UP (ref 40–120)
ALT FLD-CCNC: 22 U/L — SIGNIFICANT CHANGE UP (ref 10–45)
ALT FLD-CCNC: 23 U/L — SIGNIFICANT CHANGE UP (ref 10–45)
ANION GAP SERPL CALC-SCNC: 14 MMOL/L — SIGNIFICANT CHANGE UP (ref 5–17)
ANION GAP SERPL CALC-SCNC: 17 MMOL/L — SIGNIFICANT CHANGE UP (ref 5–17)
APTT BLD: 27.7 SEC — SIGNIFICANT CHANGE UP (ref 27.5–35.5)
AST SERPL-CCNC: 24 U/L — SIGNIFICANT CHANGE UP (ref 10–40)
BASE EXCESS BLDV CALC-SCNC: 2.8 MMOL/L — SIGNIFICANT CHANGE UP (ref -2–3)
BILIRUB SERPL-MCNC: 0.7 MG/DL — SIGNIFICANT CHANGE UP (ref 0.2–1.2)
BLD GP AB SCN SERPL QL: NEGATIVE — SIGNIFICANT CHANGE UP
BLOOD GAS VENOUS - CREATININE: SIGNIFICANT CHANGE UP MG/DL (ref 0.5–1.3)
BUN SERPL-MCNC: 71 MG/DL — HIGH (ref 7–23)
BUN SERPL-MCNC: 82 MG/DL — HIGH (ref 7–23)
CA-I SERPL-SCNC: 1.03 MMOL/L — LOW (ref 1.15–1.33)
CALCIUM SERPL-MCNC: 8.1 MG/DL — LOW (ref 8.4–10.5)
CALCIUM SERPL-MCNC: 8.2 MG/DL — LOW (ref 8.4–10.5)
CHLORIDE BLDV-SCNC: 103 MMOL/L — SIGNIFICANT CHANGE UP (ref 96–108)
CHLORIDE SERPL-SCNC: 95 MMOL/L — LOW (ref 96–108)
CHLORIDE SERPL-SCNC: 98 MMOL/L — SIGNIFICANT CHANGE UP (ref 96–108)
CO2 BLDV-SCNC: 28 MMOL/L — HIGH (ref 22–26)
CO2 SERPL-SCNC: 25 MMOL/L — SIGNIFICANT CHANGE UP (ref 22–31)
CO2 SERPL-SCNC: 27 MMOL/L — SIGNIFICANT CHANGE UP (ref 22–31)
CREAT SERPL-MCNC: 4.59 MG/DL — HIGH (ref 0.5–1.3)
CREAT SERPL-MCNC: 5.19 MG/DL — HIGH (ref 0.5–1.3)
EGFR: 12 ML/MIN/1.73M2 — LOW
EGFR: 14 ML/MIN/1.73M2 — LOW
GAS PNL BLDA: SIGNIFICANT CHANGE UP
GAS PNL BLDV: 137 MMOL/L — SIGNIFICANT CHANGE UP (ref 136–145)
GAS PNL BLDV: SIGNIFICANT CHANGE UP
GLUCOSE BLDC GLUCOMTR-MCNC: 156 MG/DL — HIGH (ref 70–99)
GLUCOSE BLDC GLUCOMTR-MCNC: 160 MG/DL — HIGH (ref 70–99)
GLUCOSE BLDC GLUCOMTR-MCNC: 174 MG/DL — HIGH (ref 70–99)
GLUCOSE BLDC GLUCOMTR-MCNC: 200 MG/DL — HIGH (ref 70–99)
GLUCOSE BLDC GLUCOMTR-MCNC: 231 MG/DL — HIGH (ref 70–99)
GLUCOSE BLDV-MCNC: 162 MG/DL — HIGH (ref 70–99)
GLUCOSE SERPL-MCNC: 165 MG/DL — HIGH (ref 70–99)
GLUCOSE SERPL-MCNC: 178 MG/DL — HIGH (ref 70–99)
HCO3 BLDV-SCNC: 27 MMOL/L — SIGNIFICANT CHANGE UP (ref 22–29)
HCT VFR BLD CALC: 25.9 % — LOW (ref 39–50)
HCT VFR BLDA CALC: 23 % — LOW (ref 39–51)
HGB BLD CALC-MCNC: 7.7 G/DL — LOW (ref 12.6–17.4)
HGB BLD-MCNC: 8 G/DL — LOW (ref 13–17)
HOROWITZ INDEX BLDV+IHG-RTO: 40 — SIGNIFICANT CHANGE UP
INR BLD: 1.03 RATIO — SIGNIFICANT CHANGE UP (ref 0.88–1.16)
LACTATE BLDV-MCNC: 1.1 MMOL/L — SIGNIFICANT CHANGE UP (ref 0.5–2)
MAGNESIUM SERPL-MCNC: 2.8 MG/DL — HIGH (ref 1.6–2.6)
MCHC RBC-ENTMCNC: 29.3 PG — SIGNIFICANT CHANGE UP (ref 27–34)
MCHC RBC-ENTMCNC: 30.9 GM/DL — LOW (ref 32–36)
MCV RBC AUTO: 94.9 FL — SIGNIFICANT CHANGE UP (ref 80–100)
NRBC # BLD: 0 /100 WBCS — SIGNIFICANT CHANGE UP (ref 0–0)
PCO2 BLDV: 40 MMHG — LOW (ref 42–55)
PH BLDV: 7.44 — HIGH (ref 7.32–7.43)
PHOSPHATE SERPL-MCNC: 5.8 MG/DL — HIGH (ref 2.5–4.5)
PLATELET # BLD AUTO: 112 K/UL — LOW (ref 150–400)
PO2 BLDV: 46 MMHG — HIGH (ref 25–45)
POTASSIUM BLDA-SCNC: 4.5 MMOL/L — SIGNIFICANT CHANGE UP (ref 3.5–5.1)
POTASSIUM BLDV-SCNC: 4.3 MMOL/L — SIGNIFICANT CHANGE UP (ref 3.5–5.1)
POTASSIUM SERPL-MCNC: 4.5 MMOL/L — SIGNIFICANT CHANGE UP (ref 3.5–5.3)
POTASSIUM SERPL-MCNC: 4.7 MMOL/L — SIGNIFICANT CHANGE UP (ref 3.5–5.3)
POTASSIUM SERPL-SCNC: 4.5 MMOL/L — SIGNIFICANT CHANGE UP (ref 3.5–5.3)
POTASSIUM SERPL-SCNC: 4.7 MMOL/L — SIGNIFICANT CHANGE UP (ref 3.5–5.3)
PROT SERPL-MCNC: 6.8 G/DL — SIGNIFICANT CHANGE UP (ref 6–8.3)
PROT SERPL-MCNC: 6.9 G/DL — SIGNIFICANT CHANGE UP (ref 6–8.3)
PROTHROM AB SERPL-ACNC: 11.9 SEC — SIGNIFICANT CHANGE UP (ref 10.5–13.4)
RBC # BLD: 2.73 M/UL — LOW (ref 4.2–5.8)
RBC # FLD: 18.7 % — HIGH (ref 10.3–14.5)
RH IG SCN BLD-IMP: POSITIVE — SIGNIFICANT CHANGE UP
SAO2 % BLDV: 73 % — SIGNIFICANT CHANGE UP (ref 67–88)
SODIUM SERPL-SCNC: 137 MMOL/L — SIGNIFICANT CHANGE UP (ref 135–145)
SODIUM SERPL-SCNC: 139 MMOL/L — SIGNIFICANT CHANGE UP (ref 135–145)
WBC # BLD: 11.14 K/UL — HIGH (ref 3.8–10.5)
WBC # FLD AUTO: 11.14 K/UL — HIGH (ref 3.8–10.5)

## 2022-12-25 PROCEDURE — 99291 CRITICAL CARE FIRST HOUR: CPT

## 2022-12-25 PROCEDURE — 99233 SBSQ HOSP IP/OBS HIGH 50: CPT | Mod: GC

## 2022-12-25 PROCEDURE — 71045 X-RAY EXAM CHEST 1 VIEW: CPT | Mod: 26

## 2022-12-25 RX ORDER — METOCLOPRAMIDE HCL 10 MG
10 TABLET ORAL EVERY 8 HOURS
Refills: 0 | Status: COMPLETED | OUTPATIENT
Start: 2022-12-25 | End: 2022-12-26

## 2022-12-25 RX ADMIN — Medication 2: at 00:18

## 2022-12-25 RX ADMIN — HEPARIN SODIUM 5000 UNIT(S): 5000 INJECTION INTRAVENOUS; SUBCUTANEOUS at 08:27

## 2022-12-25 RX ADMIN — Medication 10 MG/HR: at 07:53

## 2022-12-25 RX ADMIN — Medication 10 MG/HR: at 05:30

## 2022-12-25 RX ADMIN — Medication 10 MILLIGRAM(S): at 22:41

## 2022-12-25 RX ADMIN — Medication 81 MILLIGRAM(S): at 11:05

## 2022-12-25 RX ADMIN — HUMAN INSULIN 7 UNIT(S): 100 INJECTION, SUSPENSION SUBCUTANEOUS at 05:36

## 2022-12-25 RX ADMIN — HUMAN INSULIN 7 UNIT(S): 100 INJECTION, SUSPENSION SUBCUTANEOUS at 00:19

## 2022-12-25 RX ADMIN — Medication 15 MILLILITER(S): at 11:05

## 2022-12-25 RX ADMIN — CHLORHEXIDINE GLUCONATE 15 MILLILITER(S): 213 SOLUTION TOPICAL at 05:30

## 2022-12-25 RX ADMIN — SENNA PLUS 2 TABLET(S): 8.6 TABLET ORAL at 22:43

## 2022-12-25 RX ADMIN — HEPARIN SODIUM 5000 UNIT(S): 5000 INJECTION INTRAVENOUS; SUBCUTANEOUS at 00:18

## 2022-12-25 RX ADMIN — PANTOPRAZOLE SODIUM 40 MILLIGRAM(S): 20 TABLET, DELAYED RELEASE ORAL at 11:05

## 2022-12-25 RX ADMIN — Medication 100 MILLIGRAM(S): at 11:05

## 2022-12-25 RX ADMIN — Medication 2: at 05:35

## 2022-12-25 RX ADMIN — AMIODARONE HYDROCHLORIDE 200 MILLIGRAM(S): 400 TABLET ORAL at 05:31

## 2022-12-25 RX ADMIN — HUMAN INSULIN 7 UNIT(S): 100 INJECTION, SUSPENSION SUBCUTANEOUS at 17:29

## 2022-12-25 RX ADMIN — CHLORHEXIDINE GLUCONATE 1 APPLICATION(S): 213 SOLUTION TOPICAL at 05:19

## 2022-12-25 RX ADMIN — CHLORHEXIDINE GLUCONATE 15 MILLILITER(S): 213 SOLUTION TOPICAL at 17:29

## 2022-12-25 RX ADMIN — HUMAN INSULIN 7 UNIT(S): 100 INJECTION, SUSPENSION SUBCUTANEOUS at 11:04

## 2022-12-25 RX ADMIN — Medication 2: at 11:04

## 2022-12-25 RX ADMIN — Medication 2: at 17:30

## 2022-12-25 RX ADMIN — HEPARIN SODIUM 5000 UNIT(S): 5000 INJECTION INTRAVENOUS; SUBCUTANEOUS at 17:29

## 2022-12-25 RX ADMIN — ATORVASTATIN CALCIUM 80 MILLIGRAM(S): 80 TABLET, FILM COATED ORAL at 22:42

## 2022-12-25 NOTE — PROGRESS NOTE ADULT - PROBLEM SELECTOR PLAN 1
Pt with non oliguric ERIC/ ATN in the setting of STEMI, cardiac arrest & cardiogenic shock  SCr on arrival was 2.15; trended down to hector 1.6 on 11/25; slowly trended up & increased to 3.95 11/28.   Pt received IV diuretics. SCr increased to 4.19 with BUN of 101 on 12/5/22. Pt with significant volume overload. Pt initiated on CRRT/CVVHDF to optimize volume and electrolytes on 12/5/22. Pt likely with ATN. Pt underwent decannulation of ECMO on 12/8/22 and was taken off CRRT. Pt reinitiated on CRRT overnight on 12/9/22 for volume overload. s/p trach on 12/16. CRRT stopped again 12/19. Bladder scan daily. Now with [facility]. On Lasix gtt s/p 10mg of Metolazone daily since 12/23. None given today so far. Would give another dose today. Anticipate will need HD 12/26. Will likely need tunneled catheter placed for ongoing dialysis needs. Monitor labs and urine output. Avoid any potential nephrotoxins. Dose medications as per HD.    #Anemia  Please check TSAT, may benefit from IV Iron if low.     If you have any questions, please feel free to contact me  Arsalan Cochran  Nephrology Fellow  [phone]; Prefer [employer] TEAMS  (After 5pm or on weekends please page the on-call fellow) Pt with non oliguric ERIC/ ATN in the setting of STEMI, cardiac arrest & cardiogenic shock  SCr on arrival was 2.15; trended down to hector 1.6 on 11/25; slowly trended up & increased to 3.95 11/28.   Pt received IV diuretics. SCr increased to 4.19 with BUN of 101 on 12/5/22. Pt with significant volume overload. Pt initiated on CRRT/CVVHDF to optimize volume and electrolytes on 12/5/22. Pt likely with ATN. Pt underwent decannulation of ECMO on 12/8/22 and was taken off CRRT. Pt reinitiated on CRRT overnight on 12/9/22 for volume overload. s/p trach on 12/16. CRRT stopped again 12/19. Bladder scan daily. Now with De [facility]. On Lasix gtt s/p 10mg of Metolazone daily since 12/23. None given today so far. Would give another dose today. Anticipate will need HD 12/26. Will likely need tunneled catheter placed for ongoing dialysis needs. Monitor labs and urine output. Avoid any potential nephrotoxins. Dose medications as per HD.    #Anemia  Please check TSAT, may benefit from IV Iron if low.     If you have any questions, please feel free to contact me  Arsalan Cochran  Nephrology Fellow  [phone]; Prefer [employer] TEAMS  (After 5pm or on weekends please page the on-call fellow) Pt with non oliguric ERIC/ ATN in the setting of STEMI, cardiac arrest & cardiogenic shock  SCr on arrival was 2.15; trended down to hector 1.6 on 11/25; slowly trended up & increased to 3.95 11/28.   Pt received IV diuretics. SCr increased to 4.19 with BUN of 101 on 12/5/22. Pt with significant volume overload. Pt initiated on CRRT/CVVHDF to optimize volume and electrolytes on 12/5/22. Pt likely with ATN. Pt underwent decannulation of ECMO on 12/8/22 and was taken off CRRT. Pt reinitiated on CRRT overnight on 12/9/22 for volume overload. s/p trach on 12/16. CRRT stopped again 12/19. Bladder scan daily. Now with De [facility]. On Lasix gtt s/p 10mg of Metolazone daily since 12/23. None given today so far. Would give another dose today. Anticipate will need HD 12/26. Will likely need tunneled catheter placed for ongoing dialysis needs. Monitor labs and urine output. Avoid any potential nephrotoxins. Dose medications as per HD.    #Anemia  Please check TSAT, may benefit from IV Iron if low.     If you have any questions, please feel free to contact me  Arsalan Cochran  Nephrology Fellow  [phone]; Prefer [employer] TEAMS  (After 5pm or on weekends please page the on-call fellow)

## 2022-12-25 NOTE — PROGRESS NOTE ADULT - ATTENDING COMMENTS
Isabella Strauss MD  Office : [phone]  Contact me on [employer] Teams. Isabella Strauss MD  Office : [phone]  Contact me on [employer] Teams. Isabella Strauss MD  Office : [phone]  Contact me on [employer] Teams.

## 2022-12-25 NOTE — PROGRESS NOTE ADULT - SUBJECTIVE AND OBJECTIVE BOX
Patient seen and examined at the bedside.    Remains critically ill on continuous ICU monitoring.      Brief Summary:  61 y/o male with PMH of CABG, DM, HTN, HLD presenting as transfer from [place] with a STEMI.   He was in cardiogenic shock requiring VA ECMO, respiratory failure requiring tracheostomy, and renal failure requiring RRT.      24 Hour events:  No acute events.  Tolerated trach collar all day - rested for several hours overnight.  Making urine with diuretics, but planning for HD tomorrow.      OBJECTIVE:  ICU Vital Signs Last 24 Hrs  T(C): 36.9 (25 Dec 2022 08:00), Max: 36.9 (25 Dec 2022 08:00)  T(F): 98.4 (25 Dec 2022 08:00), Max: 98.4 (25 Dec 2022 08:00)  HR: 102 (25 Dec 2022 11:00) (92 - 103)  BP: 111/53 (25 Dec 2022 11:00) (90/55 - 113/62)  BP(mean): 76 (25 Dec 2022 11:00) (70 - 81)  ABP: 96/41 (25 Dec 2022 11:00) (96/41 - 134/47)  ABP(mean): 53 (25 Dec 2022 11:00) (53 - 78)  RR: 28 (25 Dec 2022 11:00) (16 - 34)  SpO2: 95% (25 Dec 2022 11:00) (94% - 100%)    O2 Parameters below as of 25 Dec 2022 08:00  Patient On (Oxygen Delivery Method): tracheostomy collar    O2 Concentration (%): 40    Mode: [place]ista  FiO2: 50              Physical Exam:   General: OOB to chair, no acute distress  Neurology: Following commands, interactive  Eyes: Bilateral pupils reactive   ENT/Neck: +Trach, Neck supple  Respiratory: On trach collar  CV: S1S2, Sinus   Abdominal: Soft, NT, ND +BS   Extremities: Warm          -------------------------------------------------------------------------------------------------------------------------------                                   8.0    11.14 )-----------( 112      ( 25 Dec 2022 00:28 )             25.9     PT/INR - ( 25 Dec 2022 00:28 )   PT: 11.9 sec;   INR: 1.03 ratio         PTT - ( 25 Dec 2022 00:28 )  PTT:27.7 sec  139    |  98     |  71     ----------------------------<  178        ( 25 Dec 2022 00:28 )  4.7     |  27     |  4.59     Ca    8.2        ( 25 Dec 2022 00:28 )  Phos  5.8       ( 25 Dec 2022 00:28 )  Mg     2.8       ( 25 Dec 2022 00:28 )    TPro  6.8    /  Alb  3.3    /  TBili  0.7    /  DBili  x      /  AST  24     /  ALT  23     /  AlkPhos  92     ( 25 Dec 2022 00:28 )    LIVER FUNCTIONS - ( 25 Dec 2022 00:28 )  Alb: 3.3 g/dL / Pro: 6.8 g/dL / ALK PHOS: 92 U/L / ALT: 23 U/L / AST: 24 U/L / GGT: x           ABG - ( 25 Dec 2022 00:14 )  pH, Arterial: 7.49  pH, Blood: x     /  pCO2: 36    /  pO2: 131   / HCO3: 27    / Base Excess: 3.9   /  SaO2: 99.0          ------------------------------------------------------------------------------------------------------------------------------  Assessment:  61 y/o male with PMH of CABG, DM, HTN, HLD presenting as transfer from [place] with STEMI.     Cardiogenic shock s/p VA ECMO decannulated 12/8  Mitral regurgitation s/p Mitral clip x1 12/16/22  Acute respiratory distress/failure s/p Tracheostomy 12/16/22   At high risk for hemodynamic instability  ERIC/Renal failure  Thrombocytopenia  Anemia        Plan:   ***Neuro***  History of SDH  PT ongoing.  Pain control with prns  Optimize day/night cycles.    ***Cardiovascular***  At high risk for hemodynamic instability and cardiac arrhythmias.  Off pressors currently.  PO Amiodarone   ASA /Statin daily    ***Pulmonary***  Postoperative acute respiratory insufficiency - extending trach collar trials.  Deep breathing and coughing exercises.  Wean oxygen as able.    ***GI***  Protein calorie malnutrition - Tube feeds via nasal feeding tube  Protonix for stress ulcer prophylaxis   Bowel regimen with senna    ***Renal***  Renal failure - Continue intermittent HD  Making urine but will plan for HD tomorrow.  Continue Lasix infusion and Zaroxolyn.  Replete electrolytes as indicated.  May need a permacath.    ***ID***  Off antibiotics  Trend WBC count    ***Endocrine***  Hx of DM2, continue glycemic control w/ ISS and NPH.    ***Hematology***  Acute blood loss anemia - no transfusion indication currently, but will likely transfusion with HD tomorrow.  SQ Heparin for DVT prophylaxis        Patient requires continuous monitoring with bedside rhythm monitoring, pulse oximetry monitoring, and continuous central venous and arterial pressure monitoring; and intermittent blood gas analysis. Care plan discussed with the ICU care team.   Patient remained critical, at risk for life threatening decompensation.    I have spent 32 minutes providing critical care management to this patient.      Electronically signed: Jade Rosas MD Patient seen and examined at the bedside.    Remains critically ill on continuous ICU monitoring.      Brief Summary:  61 y/o male with PMH of CABG, DM, HTN, HLD presenting as transfer from [place] with a STEMI.   He was in cardiogenic shock requiring VA ECMO, respiratory failure requiring tracheostomy, and renal failure requiring RRT.      24 Hour events:  No acute events.  Tolerated trach collar all day - rested for several hours overnight.  Making urine with diuretics, but planning for HD tomorrow.      OBJECTIVE:  ICU Vital Signs Last 24 Hrs  T(C): 36.9 (25 Dec 2022 08:00), Max: 36.9 (25 Dec 2022 08:00)  T(F): 98.4 (25 Dec 2022 08:00), Max: 98.4 (25 Dec 2022 08:00)  HR: 102 (25 Dec 2022 11:00) (92 - 103)  BP: 111/53 (25 Dec 2022 11:00) (90/55 - 113/62)  BP(mean): 76 (25 Dec 2022 11:00) (70 - 81)  ABP: 96/41 (25 Dec 2022 11:00) (96/41 - 134/47)  ABP(mean): 53 (25 Dec 2022 11:00) (53 - 78)  RR: 28 (25 Dec 2022 11:00) (16 - 34)  SpO2: 95% (25 Dec 2022 11:00) (94% - 100%)    O2 Parameters below as of 25 Dec 2022 08:00  Patient On (Oxygen Delivery Method): tracheostomy collar    O2 Concentration (%): 40    Mode: [place]  FiO2: 50              Physical Exam:   General: OOB to chair, no acute distress  Neurology: Following commands, interactive  Eyes: Bilateral pupils reactive   ENT/Neck: +Trach, Neck supple  Respiratory: On trach collar  CV: S1S2, Sinus   Abdominal: Soft, NT, ND +BS   Extremities: Warm          -------------------------------------------------------------------------------------------------------------------------------                                   8.0    11.14 )-----------( 112      ( 25 Dec 2022 00:28 )             25.9     PT/INR - ( 25 Dec 2022 00:28 )   PT: 11.9 sec;   INR: 1.03 ratio         PTT - ( 25 Dec 2022 00:28 )  PTT:27.7 sec  139    |  98     |  71     ----------------------------<  178        ( 25 Dec 2022 00:28 )  4.7     |  27     |  4.59     Ca    8.2        ( 25 Dec 2022 00:28 )  Phos  5.8       ( 25 Dec 2022 00:28 )  Mg     2.8       ( 25 Dec 2022 00:28 )    TPro  6.8    /  Alb  3.3    /  TBili  0.7    /  DBili  x      /  AST  24     /  ALT  23     /  AlkPhos  92     ( 25 Dec 2022 00:28 )    LIVER FUNCTIONS - ( 25 Dec 2022 00:28 )  Alb: 3.3 g/dL / Pro: 6.8 g/dL / ALK PHOS: 92 U/L / ALT: 23 U/L / AST: 24 U/L / GGT: x           ABG - ( 25 Dec 2022 00:14 )  pH, Arterial: 7.49  pH, Blood: x     /  pCO2: 36    /  pO2: 131   / HCO3: 27    / Base Excess: 3.9   /  SaO2: 99.0          ------------------------------------------------------------------------------------------------------------------------------  Assessment:  61 y/o male with PMH of CABG, DM, HTN, HLD presenting as transfer from [place] with STEMI.     Cardiogenic shock s/p VA ECMO decannulated 12/8  Mitral regurgitation s/p Mitral clip x1 12/16/22  Acute respiratory distress/failure s/p Tracheostomy 12/16/22   At high risk for hemodynamic instability  ERIC/Renal failure  Thrombocytopenia  Anemia        Plan:   ***Neuro***  History of SDH  PT ongoing.  Pain control with prns  Optimize day/night cycles.    ***Cardiovascular***  At high risk for hemodynamic instability and cardiac arrhythmias.  Off pressors currently.  PO Amiodarone   ASA /Statin daily    ***Pulmonary***  Postoperative acute respiratory insufficiency - extending trach collar trials.  Deep breathing and coughing exercises.  Wean oxygen as able.    ***GI***  Protein calorie malnutrition - Tube feeds via nasal feeding tube  Protonix for stress ulcer prophylaxis   Bowel regimen with senna    ***Renal***  Renal failure - Continue intermittent HD  Making urine but will plan for HD tomorrow.  Continue Lasix infusion and Zaroxolyn.  Replete electrolytes as indicated.  May need a permacath.    ***ID***  Off antibiotics  Trend WBC count    ***Endocrine***  Hx of DM2, continue glycemic control w/ ISS and NPH.    ***Hematology***  Acute blood loss anemia - no transfusion indication currently, but will likely transfusion with HD tomorrow.  SQ Heparin for DVT prophylaxis        Patient requires continuous monitoring with bedside rhythm monitoring, pulse oximetry monitoring, and continuous central venous and arterial pressure monitoring; and intermittent blood gas analysis. Care plan discussed with the ICU care team.   Patient remained critical, at risk for life threatening decompensation.    I have spent 32 minutes providing critical care management to this patient.      Electronically signed: Jade Rosas MD Patient seen and examined at the bedside.    Remains critically ill on continuous ICU monitoring.      Brief Summary:  61 y/o male with PMH of CABG, DM, HTN, HLD presenting as transfer from [place] with a STEMI.   He was in cardiogenic shock requiring VA ECMO, respiratory failure requiring tracheostomy, and renal failure requiring RRT.      24 Hour events:  No acute events.  Tolerated trach collar all day - rested for several hours overnight.  Making urine with diuretics, but planning for HD tomorrow.      OBJECTIVE:  ICU Vital Signs Last 24 Hrs  T(C): 36.9 (25 Dec 2022 08:00), Max: 36.9 (25 Dec 2022 08:00)  T(F): 98.4 (25 Dec 2022 08:00), Max: 98.4 (25 Dec 2022 08:00)  HR: 102 (25 Dec 2022 11:00) (92 - 103)  BP: 111/53 (25 Dec 2022 11:00) (90/55 - 113/62)  BP(mean): 76 (25 Dec 2022 11:00) (70 - 81)  ABP: 96/41 (25 Dec 2022 11:00) (96/41 - 134/47)  ABP(mean): 53 (25 Dec 2022 11:00) (53 - 78)  RR: 28 (25 Dec 2022 11:00) (16 - 34)  SpO2: 95% (25 Dec 2022 11:00) (94% - 100%)    O2 Parameters below as of 25 Dec 2022 08:00  Patient On (Oxygen Delivery Method): tracheostomy collar    O2 Concentration (%): 40    Mode: [place]avista  FiO2: 50              Physical Exam:   General: OOB to chair, no acute distress  Neurology: Following commands, interactive  Eyes: Bilateral pupils reactive   ENT/Neck: +Trach, Neck supple  Respiratory: On trach collar  CV: S1S2, Sinus   Abdominal: Soft, NT, ND +BS   Extremities: Warm          -------------------------------------------------------------------------------------------------------------------------------                                   8.0    11.14 )-----------( 112      ( 25 Dec 2022 00:28 )             25.9     PT/INR - ( 25 Dec 2022 00:28 )   PT: 11.9 sec;   INR: 1.03 ratio         PTT - ( 25 Dec 2022 00:28 )  PTT:27.7 sec  139    |  98     |  71     ----------------------------<  178        ( 25 Dec 2022 00:28 )  4.7     |  27     |  4.59     Ca    8.2        ( 25 Dec 2022 00:28 )  Phos  5.8       ( 25 Dec 2022 00:28 )  Mg     2.8       ( 25 Dec 2022 00:28 )    TPro  6.8    /  Alb  3.3    /  TBili  0.7    /  DBili  x      /  AST  24     /  ALT  23     /  AlkPhos  92     ( 25 Dec 2022 00:28 )    LIVER FUNCTIONS - ( 25 Dec 2022 00:28 )  Alb: 3.3 g/dL / Pro: 6.8 g/dL / ALK PHOS: 92 U/L / ALT: 23 U/L / AST: 24 U/L / GGT: x           ABG - ( 25 Dec 2022 00:14 )  pH, Arterial: 7.49  pH, Blood: x     /  pCO2: 36    /  pO2: 131   / HCO3: 27    / Base Excess: 3.9   /  SaO2: 99.0          ------------------------------------------------------------------------------------------------------------------------------  Assessment:  61 y/o male with PMH of CABG, DM, HTN, HLD presenting as transfer from [place] with STEMI.     Cardiogenic shock s/p VA ECMO decannulated 12/8  Mitral regurgitation s/p Mitral clip x1 12/16/22  Acute respiratory distress/failure s/p Tracheostomy 12/16/22   At high risk for hemodynamic instability  ERIC/Renal failure  Thrombocytopenia  Anemia        Plan:   ***Neuro***  History of SDH  PT ongoing.  Pain control with prns  Optimize day/night cycles.    ***Cardiovascular***  At high risk for hemodynamic instability and cardiac arrhythmias.  Off pressors currently.  PO Amiodarone   ASA /Statin daily    ***Pulmonary***  Postoperative acute respiratory insufficiency - extending trach collar trials.  Deep breathing and coughing exercises.  Wean oxygen as able.    ***GI***  Protein calorie malnutrition - Tube feeds via nasal feeding tube  Protonix for stress ulcer prophylaxis   Bowel regimen with senna    ***Renal***  Renal failure - Continue intermittent HD  Making urine but will plan for HD tomorrow.  Continue Lasix infusion and Zaroxolyn.  Replete electrolytes as indicated.  May need a permacath.    ***ID***  Off antibiotics  Trend WBC count    ***Endocrine***  Hx of DM2, continue glycemic control w/ ISS and NPH.    ***Hematology***  Acute blood loss anemia - no transfusion indication currently, but will likely transfusion with HD tomorrow.  SQ Heparin for DVT prophylaxis        Patient requires continuous monitoring with bedside rhythm monitoring, pulse oximetry monitoring, and continuous central venous and arterial pressure monitoring; and intermittent blood gas analysis. Care plan discussed with the ICU care team.   Patient remained critical, at risk for life threatening decompensation.    I have spent 32 minutes providing critical care management to this patient.      Electronically signed: Jade Rosas MD

## 2022-12-25 NOTE — PROGRESS NOTE ADULT - SUBJECTIVE AND OBJECTIVE BOX
[facility] DIVISION OF KIDNEY DISEASES AND HYPERTENSION -- FOLLOW UP NOTE  --------------------------------------------------------------------------------  Chief Complaint: Acute pancreatitis without infection or necrosis    HPI:  63 y/o male with PMH of CABG, DM, HTN, HLD presenting as transfer from [place] for c/f STEMI. Course c/b gallstone pancreatitis leading to ARDS and shock & cardiac arrest now on [facility]. Had significant troponin elevation and his LVEF down to 8%. Pt was deemed to be too unstable to have an angiogram and potential PCI due to his co-morbidities & a new subdural bleed. Pt being seen for ERIC. SCr on arrival was 2.15; trended down to hector 1.6 on 11/25 and eventually increased to 3.95 11/28. Pt received IV diuretics as per CTU. Pt initiated on CRRT on 12/5/22 to optimize volume status and electrolytes.  Pt underwent decannulation of ECMO on 12/8/22. Pt was restarted on 12/9/22 for volume overload. Pt underwent mitral clip OR (12/16/22).  s/p trach on 12/16     HPI: Pt. seen this AM. On Lasix gtt over night. 1.1L UOP, but still net+ 1L.     PAST HISTORY  --------------------------------------------------------------------------------  No significant changes to PMH, PSH, FHx, SHx, unless otherwise noted    ALLERGIES & MEDICATIONS  --------------------------------------------------------------------------------  Allergies    No Known Allergies    Intolerances      Standing Inpatient Medications  aMIOdarone    Tablet 200 milliGRAM(s) Oral daily  aspirin  chewable 81 milliGRAM(s) Oral daily  atorvastatin 80 milliGRAM(s) Oral at bedtime  chlorhexidine 0.12% Liquid 15 milliLiter(s) Oral Mucosa every 12 hours  chlorhexidine 2% Cloths 1 Application(s) Topical <User Schedule>  chlorhexidine 4% Liquid 1 Application(s) Topical <User Schedule>  furosemide Infusion 20 mG/Hr IV Continuous <Continuous>  heparin   Injectable 5000 Unit(s) SubCutaneous every 8 hours  insulin lispro (ADMELOG) corrective regimen sliding scale   SubCutaneous every 6 hours  insulin NPH human recombinant 7 Unit(s) SubCutaneous every 6 hours  multivitamin/minerals/iron Oral Solution (CENTRUM) 15 milliLiter(s) Oral daily  pantoprazole  Injectable 40 milliGRAM(s) IV Push daily  senna 2 Tablet(s) Oral at bedtime  thiamine 100 milliGRAM(s) Enteral Tube daily    PRN Inpatient Medications  acetaminophen    Suspension .. 650 milliGRAM(s) Oral every 6 hours PRN  sodium chloride 0.9% lock flush 10 milliLiter(s) IV Push every 1 hour PRN      REVIEW OF SYSTEMS  --------------------------------------------------------------------------------  Unable to obtain    VITALS/PHYSICAL EXAM  --------------------------------------------------------------------------------  T(C): 36.9 (12-25-22 @ 08:00), Max: 36.9 (12-25-22 @ 08:00)  HR: 101 (12-25-22 @ 10:00) (92 - 103)  BP: 110/57 (12-25-22 @ 10:00) (90/55 - 113/62)  RR: 25 (12-25-22 @ 10:00) (16 - 34)  SpO2: 95% (12-25-22 @ 10:00) (94% - 100%)  Wt(kg): --        12-24-22 @ 07:01  -  12-25-22 @ 07:00  --------------------------------------------------------  IN: 2175 mL / OUT: 1095 mL / NET: 1080 mL    12-25-22 @ 07:01  -  12-25-22 @ 10:42  --------------------------------------------------------  IN: 240 mL / OUT: 180 mL / NET: 60 mL      Physical Exam:  	Gen: ill appearing male  	HEENT: Anicteric  	Pulm:  trach+  	CV: S1S2+  	Abd: Soft, +BS       	Ext: + LE edema B/L  	Neuro: Awake  	Skin: Warm and dry             Acces: CARMELO non tunneled HD catheter    LABS/STUDIES  --------------------------------------------------------------------------------              8.0    11.14 >-----------<  112      [12-25-22 @ 00:28]              25.9     139  |  98  |  71  ----------------------------<  178      [12-25-22 @ 00:28]  4.7   |  27  |  4.59        Ca     8.2     [12-25-22 @ 00:28]      Mg     2.8     [12-25-22 @ 00:28]      Phos  5.8     [12-25-22 @ 00:28]    TPro  6.8  /  Alb  3.3  /  TBili  0.7  /  DBili  x   /  AST  24  /  ALT  23  /  AlkPhos  92  [12-25-22 @ 00:28]    PT/INR: PT 11.9 , INR 1.03       [12-25-22 @ 00:28]  PTT: 27.7       [12-25-22 @ 00:28]      Creatinine Trend:  SCr 4.59 [12-25 @ 00:28]  SCr 3.22 [12-24 @ 00:43]  SCr 4.21 [12-23 @ 01:22]  SCr 2.74 [12-22 @ 00:13]  SCr 2.37 [12-21 @ 00:38]    Urinalysis - [12-16-22 @ 04:28]      Color DK BROWN / Appearance Turbid / SG 1.029 / pH 6.0      Gluc Trace / Ketone Negative  / Bili Negative / Urobili 6 mg/dL       Blood Large / Protein 300 mg/dL / Leuk Est Large / Nitrite Negative       /  / Hyaline 85 / Gran  / Sq Epi  / Non Sq Epi 8 / Bacteria Moderate      TSH 2.84      [12-10-22 @ 15:55]  Lipid: chol --, , HDL --, LDL --      [12-05-22 @ 00:50]    HBsAb 889.7      [12-20-22 @ 11:25]  HBcAb Reactive      [12-20-22 @ 11:25]  HCV 0.15, Nonreact      [12-20-22 @ 11:25]     [facility] DIVISION OF KIDNEY DISEASES AND HYPERTENSION -- FOLLOW UP NOTE  --------------------------------------------------------------------------------  Chief Complaint: Acute pancreatitis without infection or necrosis    HPI:  61 y/o male with PMH of CABG, DM, HTN, HLD presenting as transfer from [place] for c/f STEMI. Course c/b gallstone pancreatitis leading to ARDS and shock & cardiac arrest now on [facility]. Had significant troponin elevation and his LVEF down to 8%. Pt was deemed to be too unstable to have an angiogram and potential PCI due to his co-morbidities & a new subdural bleed. Pt being seen for ERIC. SCr on arrival was 2.15; trended down to hector 1.6 on 11/25 and eventually increased to 3.95 11/28. Pt received IV diuretics as per CTU. Pt initiated on CRRT on 12/5/22 to optimize volume status and electrolytes.  Pt underwent decannulation of ECMO on 12/8/22. Pt was restarted on 12/9/22 for volume overload. Pt underwent mitral clip OR (12/16/22).  s/p trach on 12/16     HPI: Pt. seen this AM. On Lasix gtt over night. 1.1L UOP, but still net+ 1L.     PAST HISTORY  --------------------------------------------------------------------------------  No significant changes to PMH, PSH, FHx, SHx, unless otherwise noted    ALLERGIES & MEDICATIONS  --------------------------------------------------------------------------------  Allergies    No Known Allergies    Intolerances      Standing Inpatient Medications  aMIOdarone    Tablet 200 milliGRAM(s) Oral daily  aspirin  chewable 81 milliGRAM(s) Oral daily  atorvastatin 80 milliGRAM(s) Oral at bedtime  chlorhexidine 0.12% Liquid 15 milliLiter(s) Oral Mucosa every 12 hours  chlorhexidine 2% Cloths 1 Application(s) Topical <User Schedule>  chlorhexidine 4% Liquid 1 Application(s) Topical <User Schedule>  furosemide Infusion 20 mG/Hr IV Continuous <Continuous>  heparin   Injectable 5000 Unit(s) SubCutaneous every 8 hours  insulin lispro (ADMELOG) corrective regimen sliding scale   SubCutaneous every 6 hours  insulin NPH human recombinant 7 Unit(s) SubCutaneous every 6 hours  multivitamin/minerals/iron Oral Solution (CENTRUM) 15 milliLiter(s) Oral daily  pantoprazole  Injectable 40 milliGRAM(s) IV Push daily  senna 2 Tablet(s) Oral at bedtime  thiamine 100 milliGRAM(s) Enteral Tube daily    PRN Inpatient Medications  acetaminophen    Suspension .. 650 milliGRAM(s) Oral every 6 hours PRN  sodium chloride 0.9% lock flush 10 milliLiter(s) IV Push every 1 hour PRN      REVIEW OF SYSTEMS  --------------------------------------------------------------------------------  Unable to obtain    VITALS/PHYSICAL EXAM  --------------------------------------------------------------------------------  T(C): 36.9 (12-25-22 @ 08:00), Max: 36.9 (12-25-22 @ 08:00)  HR: 101 (12-25-22 @ 10:00) (92 - 103)  BP: 110/57 (12-25-22 @ 10:00) (90/55 - 113/62)  RR: 25 (12-25-22 @ 10:00) (16 - 34)  SpO2: 95% (12-25-22 @ 10:00) (94% - 100%)  Wt(kg): --        12-24-22 @ 07:01  -  12-25-22 @ 07:00  --------------------------------------------------------  IN: 2175 mL / OUT: 1095 mL / NET: 1080 mL    12-25-22 @ 07:01  -  12-25-22 @ 10:42  --------------------------------------------------------  IN: 240 mL / OUT: 180 mL / NET: 60 mL      Physical Exam:  	Gen: ill appearing male  	HEENT: Anicteric  	Pulm:  trach+  	CV: S1S2+  	Abd: Soft, +BS       	Ext: + LE edema B/L  	Neuro: Awake  	Skin: Warm and dry             Acces: CARMELO non tunneled HD catheter    LABS/STUDIES  --------------------------------------------------------------------------------              8.0    11.14 >-----------<  112      [12-25-22 @ 00:28]              25.9     139  |  98  |  71  ----------------------------<  178      [12-25-22 @ 00:28]  4.7   |  27  |  4.59        Ca     8.2     [12-25-22 @ 00:28]      Mg     2.8     [12-25-22 @ 00:28]      Phos  5.8     [12-25-22 @ 00:28]    TPro  6.8  /  Alb  3.3  /  TBili  0.7  /  DBili  x   /  AST  24  /  ALT  23  /  AlkPhos  92  [12-25-22 @ 00:28]    PT/INR: PT 11.9 , INR 1.03       [12-25-22 @ 00:28]  PTT: 27.7       [12-25-22 @ 00:28]      Creatinine Trend:  SCr 4.59 [12-25 @ 00:28]  SCr 3.22 [12-24 @ 00:43]  SCr 4.21 [12-23 @ 01:22]  SCr 2.74 [12-22 @ 00:13]  SCr 2.37 [12-21 @ 00:38]    Urinalysis - [12-16-22 @ 04:28]      Color DK BROWN / Appearance Turbid / SG 1.029 / pH 6.0      Gluc Trace / Ketone Negative  / Bili Negative / Urobili 6 mg/dL       Blood Large / Protein 300 mg/dL / Leuk Est Large / Nitrite Negative       /  / Hyaline 85 / Gran  / Sq Epi  / Non Sq Epi 8 / Bacteria Moderate      TSH 2.84      [12-10-22 @ 15:55]  Lipid: chol --, , HDL --, LDL --      [12-05-22 @ 00:50]    HBsAb 889.7      [12-20-22 @ 11:25]  HBcAb Reactive      [12-20-22 @ 11:25]  HCV 0.15, Nonreact      [12-20-22 @ 11:25]     [facility] DIVISION OF KIDNEY DISEASES AND HYPERTENSION -- FOLLOW UP NOTE  --------------------------------------------------------------------------------  Chief Complaint: Acute pancreatitis without infection or necrosis    HPI:  61 y/o male with PMH of CABG, DM, HTN, HLD presenting as transfer from [place] for c/f STEMI. Course c/b gallstone pancreatitis leading to ARDS and shock & cardiac arrest now on [facility]. Had significant troponin elevation and his LVEF down to 8%. Pt was deemed to be too unstable to have an angiogram and potential PCI due to his co-morbidities & a new subdural bleed. Pt being seen for ERIC. SCr on arrival was 2.15; trended down to hector 1.6 on 11/25 and eventually increased to 3.95 11/28. Pt received IV diuretics as per CTU. Pt initiated on CRRT on 12/5/22 to optimize volume status and electrolytes.  Pt underwent decannulation of ECMO on 12/8/22. Pt was restarted on 12/9/22 for volume overload. Pt underwent mitral clip OR (12/16/22).  s/p trach on 12/16     HPI: Pt. seen this AM. On Lasix gtt over night. 1.1L UOP, but still net+ 1L.     PAST HISTORY  --------------------------------------------------------------------------------  No significant changes to PMH, PSH, FHx, SHx, unless otherwise noted    ALLERGIES & MEDICATIONS  --------------------------------------------------------------------------------  Allergies    No Known Allergies    Intolerances      Standing Inpatient Medications  aMIOdarone    Tablet 200 milliGRAM(s) Oral daily  aspirin  chewable 81 milliGRAM(s) Oral daily  atorvastatin 80 milliGRAM(s) Oral at bedtime  chlorhexidine 0.12% Liquid 15 milliLiter(s) Oral Mucosa every 12 hours  chlorhexidine 2% Cloths 1 Application(s) Topical <User Schedule>  chlorhexidine 4% Liquid 1 Application(s) Topical <User Schedule>  furosemide Infusion 20 mG/Hr IV Continuous <Continuous>  heparin   Injectable 5000 Unit(s) SubCutaneous every 8 hours  insulin lispro (ADMELOG) corrective regimen sliding scale   SubCutaneous every 6 hours  insulin NPH human recombinant 7 Unit(s) SubCutaneous every 6 hours  multivitamin/minerals/iron Oral Solution (CENTRUM) 15 milliLiter(s) Oral daily  pantoprazole  Injectable 40 milliGRAM(s) IV Push daily  senna 2 Tablet(s) Oral at bedtime  thiamine 100 milliGRAM(s) Enteral Tube daily    PRN Inpatient Medications  acetaminophen    Suspension .. 650 milliGRAM(s) Oral every 6 hours PRN  sodium chloride 0.9% lock flush 10 milliLiter(s) IV Push every 1 hour PRN      REVIEW OF SYSTEMS  --------------------------------------------------------------------------------  Unable to obtain    VITALS/PHYSICAL EXAM  --------------------------------------------------------------------------------  T(C): 36.9 (12-25-22 @ 08:00), Max: 36.9 (12-25-22 @ 08:00)  HR: 101 (12-25-22 @ 10:00) (92 - 103)  BP: 110/57 (12-25-22 @ 10:00) (90/55 - 113/62)  RR: 25 (12-25-22 @ 10:00) (16 - 34)  SpO2: 95% (12-25-22 @ 10:00) (94% - 100%)  Wt(kg): --        12-24-22 @ 07:01  -  12-25-22 @ 07:00  --------------------------------------------------------  IN: 2175 mL / OUT: 1095 mL / NET: 1080 mL    12-25-22 @ 07:01  -  12-25-22 @ 10:42  --------------------------------------------------------  IN: 240 mL / OUT: 180 mL / NET: 60 mL      Physical Exam:  	Gen: ill appearing male  	HEENT: Anicteric  	Pulm:  trach+  	CV: S1S2+  	Abd: Soft, +BS       	Ext: + LE edema B/L  	Neuro: Awake  	Skin: Warm and dry             Acces: CARMELO non tunneled HD catheter    LABS/STUDIES  --------------------------------------------------------------------------------              8.0    11.14 >-----------<  112      [12-25-22 @ 00:28]              25.9     139  |  98  |  71  ----------------------------<  178      [12-25-22 @ 00:28]  4.7   |  27  |  4.59        Ca     8.2     [12-25-22 @ 00:28]      Mg     2.8     [12-25-22 @ 00:28]      Phos  5.8     [12-25-22 @ 00:28]    TPro  6.8  /  Alb  3.3  /  TBili  0.7  /  DBili  x   /  AST  24  /  ALT  23  /  AlkPhos  92  [12-25-22 @ 00:28]    PT/INR: PT 11.9 , INR 1.03       [12-25-22 @ 00:28]  PTT: 27.7       [12-25-22 @ 00:28]      Creatinine Trend:  SCr 4.59 [12-25 @ 00:28]  SCr 3.22 [12-24 @ 00:43]  SCr 4.21 [12-23 @ 01:22]  SCr 2.74 [12-22 @ 00:13]  SCr 2.37 [12-21 @ 00:38]    Urinalysis - [12-16-22 @ 04:28]      Color DK BROWN / Appearance Turbid / SG 1.029 / pH 6.0      Gluc Trace / Ketone Negative  / Bili Negative / Urobili 6 mg/dL       Blood Large / Protein 300 mg/dL / Leuk Est Large / Nitrite Negative       /  / Hyaline 85 / Gran  / Sq Epi  / Non Sq Epi 8 / Bacteria Moderate      TSH 2.84      [12-10-22 @ 15:55]  Lipid: chol --, , HDL --, LDL --      [12-05-22 @ 00:50]    HBsAb 889.7      [12-20-22 @ 11:25]  HBcAb Reactive      [12-20-22 @ 11:25]  HCV 0.15, Nonreact      [12-20-22 @ 11:25]

## 2022-12-26 DIAGNOSIS — D64.9 ANEMIA, UNSPECIFIED: ICD-10-CM

## 2022-12-26 LAB
ALBUMIN SERPL ELPH-MCNC: 3.4 G/DL — SIGNIFICANT CHANGE UP (ref 3.3–5)
ALP SERPL-CCNC: 94 U/L — SIGNIFICANT CHANGE UP (ref 40–120)
ALT FLD-CCNC: 23 U/L — SIGNIFICANT CHANGE UP (ref 10–45)
ANION GAP SERPL CALC-SCNC: 18 MMOL/L — HIGH (ref 5–17)
APPEARANCE UR: ABNORMAL
APPEARANCE UR: CLEAR — SIGNIFICANT CHANGE UP
AST SERPL-CCNC: 24 U/L — SIGNIFICANT CHANGE UP (ref 10–40)
BACTERIA # UR AUTO: ABNORMAL
BILIRUB SERPL-MCNC: 0.7 MG/DL — SIGNIFICANT CHANGE UP (ref 0.2–1.2)
BILIRUB UR-MCNC: NEGATIVE — SIGNIFICANT CHANGE UP
BUN SERPL-MCNC: 93 MG/DL — HIGH (ref 7–23)
CALCIUM SERPL-MCNC: 8.4 MG/DL — SIGNIFICANT CHANGE UP (ref 8.4–10.5)
CHLORIDE SERPL-SCNC: 94 MMOL/L — LOW (ref 96–108)
CO2 SERPL-SCNC: 24 MMOL/L — SIGNIFICANT CHANGE UP (ref 22–31)
COLOR SPEC: SIGNIFICANT CHANGE UP
COLOR SPEC: YELLOW — SIGNIFICANT CHANGE UP
CREAT SERPL-MCNC: 5.35 MG/DL — HIGH (ref 0.5–1.3)
DIFF PNL FLD: ABNORMAL
EGFR: 11 ML/MIN/1.73M2 — LOW
EPI CELLS # UR: 0 /HPF — SIGNIFICANT CHANGE UP
EPI CELLS # UR: 2 — SIGNIFICANT CHANGE UP
GLUCOSE BLDC GLUCOMTR-MCNC: 121 MG/DL — HIGH (ref 70–99)
GLUCOSE BLDC GLUCOMTR-MCNC: 149 MG/DL — HIGH (ref 70–99)
GLUCOSE BLDC GLUCOMTR-MCNC: 158 MG/DL — HIGH (ref 70–99)
GLUCOSE BLDC GLUCOMTR-MCNC: 198 MG/DL — HIGH (ref 70–99)
GLUCOSE BLDC GLUCOMTR-MCNC: 207 MG/DL — HIGH (ref 70–99)
GLUCOSE BLDC GLUCOMTR-MCNC: 212 MG/DL — HIGH (ref 70–99)
GLUCOSE BLDC GLUCOMTR-MCNC: 218 MG/DL — HIGH (ref 70–99)
GLUCOSE BLDC GLUCOMTR-MCNC: 31 MG/DL — CRITICAL LOW (ref 70–99)
GLUCOSE BLDC GLUCOMTR-MCNC: 33 MG/DL — CRITICAL LOW (ref 70–99)
GLUCOSE BLDC GLUCOMTR-MCNC: 51 MG/DL — CRITICAL LOW (ref 70–99)
GLUCOSE SERPL-MCNC: 226 MG/DL — HIGH (ref 70–99)
GLUCOSE UR QL: NEGATIVE — SIGNIFICANT CHANGE UP
GRAM STN FLD: SIGNIFICANT CHANGE UP
HCT VFR BLD CALC: 27.3 % — LOW (ref 39–50)
HGB BLD-MCNC: 8.5 G/DL — LOW (ref 13–17)
HYALINE CASTS # UR AUTO: 0 /LPF — SIGNIFICANT CHANGE UP (ref 0–7)
HYALINE CASTS # UR AUTO: 1 /LPF — SIGNIFICANT CHANGE UP (ref 0–7)
KETONES UR-MCNC: NEGATIVE — SIGNIFICANT CHANGE UP
LEUKOCYTE ESTERASE UR-ACNC: ABNORMAL
MAGNESIUM SERPL-MCNC: 2.9 MG/DL — HIGH (ref 1.6–2.6)
MCHC RBC-ENTMCNC: 28.9 PG — SIGNIFICANT CHANGE UP (ref 27–34)
MCHC RBC-ENTMCNC: 31.1 GM/DL — LOW (ref 32–36)
MCV RBC AUTO: 92.9 FL — SIGNIFICANT CHANGE UP (ref 80–100)
NITRITE UR-MCNC: NEGATIVE — SIGNIFICANT CHANGE UP
NRBC # BLD: 0 /100 WBCS — SIGNIFICANT CHANGE UP (ref 0–0)
PH UR: 6 — SIGNIFICANT CHANGE UP (ref 5–8)
PH UR: 6.5 — SIGNIFICANT CHANGE UP (ref 5–8)
PHOSPHATE SERPL-MCNC: 6.6 MG/DL — HIGH (ref 2.5–4.5)
PLATELET # BLD AUTO: 149 K/UL — LOW (ref 150–400)
POTASSIUM SERPL-MCNC: 5 MMOL/L — SIGNIFICANT CHANGE UP (ref 3.5–5.3)
POTASSIUM SERPL-SCNC: 5 MMOL/L — SIGNIFICANT CHANGE UP (ref 3.5–5.3)
PROT SERPL-MCNC: 7.1 G/DL — SIGNIFICANT CHANGE UP (ref 6–8.3)
PROT UR-MCNC: ABNORMAL
RBC # BLD: 2.94 M/UL — LOW (ref 4.2–5.8)
RBC # FLD: 18.8 % — HIGH (ref 10.3–14.5)
RBC CASTS # UR COMP ASSIST: 177 /HPF — HIGH (ref 0–4)
RBC CASTS # UR COMP ASSIST: >50 /HPF — SIGNIFICANT CHANGE UP (ref 0–4)
SODIUM SERPL-SCNC: 136 MMOL/L — SIGNIFICANT CHANGE UP (ref 135–145)
SP GR SPEC: 1.01 — LOW (ref 1.01–1.02)
SP GR SPEC: 1.01 — SIGNIFICANT CHANGE UP (ref 1.01–1.02)
SPECIMEN SOURCE: SIGNIFICANT CHANGE UP
UROBILINOGEN FLD QL: NEGATIVE — SIGNIFICANT CHANGE UP
WBC # BLD: 15.01 K/UL — HIGH (ref 3.8–10.5)
WBC # FLD AUTO: 15.01 K/UL — HIGH (ref 3.8–10.5)
WBC UR QL: 170 /HPF — HIGH (ref 0–5)
WBC UR QL: 35 /HPF — SIGNIFICANT CHANGE UP (ref 0–5)

## 2022-12-26 PROCEDURE — 36620 INSERTION CATHETER ARTERY: CPT | Mod: 58

## 2022-12-26 PROCEDURE — 99232 SBSQ HOSP IP/OBS MODERATE 35: CPT

## 2022-12-26 PROCEDURE — 36800 INSERTION OF CANNULA: CPT | Mod: 58

## 2022-12-26 PROCEDURE — 99233 SBSQ HOSP IP/OBS HIGH 50: CPT | Mod: GC

## 2022-12-26 PROCEDURE — 71045 X-RAY EXAM CHEST 1 VIEW: CPT | Mod: 26,76

## 2022-12-26 PROCEDURE — 99291 CRITICAL CARE FIRST HOUR: CPT | Mod: 25

## 2022-12-26 RX ORDER — DEXTROSE 50 % IN WATER 50 %
50 SYRINGE (ML) INTRAVENOUS ONCE
Refills: 0 | Status: COMPLETED | OUTPATIENT
Start: 2022-12-26 | End: 2022-12-26

## 2022-12-26 RX ORDER — MEROPENEM 1 G/30ML
500 INJECTION INTRAVENOUS EVERY 24 HOURS
Refills: 0 | Status: DISCONTINUED | OUTPATIENT
Start: 2022-12-26 | End: 2022-12-27

## 2022-12-26 RX ORDER — VANCOMYCIN HCL 1 G
1000 VIAL (EA) INTRAVENOUS ONCE
Refills: 0 | Status: COMPLETED | OUTPATIENT
Start: 2022-12-26 | End: 2022-12-26

## 2022-12-26 RX ORDER — SODIUM CHLORIDE 9 MG/ML
1000 INJECTION, SOLUTION INTRAVENOUS
Refills: 0 | Status: DISCONTINUED | OUTPATIENT
Start: 2022-12-26 | End: 2022-12-26

## 2022-12-26 RX ADMIN — AMIODARONE HYDROCHLORIDE 200 MILLIGRAM(S): 400 TABLET ORAL at 06:59

## 2022-12-26 RX ADMIN — Medication 81 MILLIGRAM(S): at 13:55

## 2022-12-26 RX ADMIN — Medication 4: at 00:05

## 2022-12-26 RX ADMIN — Medication 650 MILLIGRAM(S): at 04:00

## 2022-12-26 RX ADMIN — Medication 100 MILLIGRAM(S): at 13:56

## 2022-12-26 RX ADMIN — HEPARIN SODIUM 5000 UNIT(S): 5000 INJECTION INTRAVENOUS; SUBCUTANEOUS at 00:05

## 2022-12-26 RX ADMIN — Medication 2: at 13:55

## 2022-12-26 RX ADMIN — CHLORHEXIDINE GLUCONATE 15 MILLILITER(S): 213 SOLUTION TOPICAL at 18:24

## 2022-12-26 RX ADMIN — CHLORHEXIDINE GLUCONATE 1 APPLICATION(S): 213 SOLUTION TOPICAL at 06:53

## 2022-12-26 RX ADMIN — MEROPENEM 100 MILLIGRAM(S): 1 INJECTION INTRAVENOUS at 10:30

## 2022-12-26 RX ADMIN — Medication 10 MILLIGRAM(S): at 13:56

## 2022-12-26 RX ADMIN — Medication 10 MILLIGRAM(S): at 06:59

## 2022-12-26 RX ADMIN — CHLORHEXIDINE GLUCONATE 1 APPLICATION(S): 213 SOLUTION TOPICAL at 05:46

## 2022-12-26 RX ADMIN — Medication 650 MILLIGRAM(S): at 03:09

## 2022-12-26 RX ADMIN — SENNA PLUS 2 TABLET(S): 8.6 TABLET ORAL at 22:14

## 2022-12-26 RX ADMIN — Medication 4: at 18:00

## 2022-12-26 RX ADMIN — Medication 15 MILLILITER(S): at 13:55

## 2022-12-26 RX ADMIN — ATORVASTATIN CALCIUM 80 MILLIGRAM(S): 80 TABLET, FILM COATED ORAL at 22:14

## 2022-12-26 RX ADMIN — HEPARIN SODIUM 5000 UNIT(S): 5000 INJECTION INTRAVENOUS; SUBCUTANEOUS at 00:24

## 2022-12-26 RX ADMIN — PANTOPRAZOLE SODIUM 40 MILLIGRAM(S): 20 TABLET, DELAYED RELEASE ORAL at 13:55

## 2022-12-26 RX ADMIN — HEPARIN SODIUM 5000 UNIT(S): 5000 INJECTION INTRAVENOUS; SUBCUTANEOUS at 10:00

## 2022-12-26 RX ADMIN — Medication 250 MILLIGRAM(S): at 09:30

## 2022-12-26 RX ADMIN — HUMAN INSULIN 7 UNIT(S): 100 INJECTION, SUSPENSION SUBCUTANEOUS at 00:05

## 2022-12-26 RX ADMIN — CHLORHEXIDINE GLUCONATE 15 MILLILITER(S): 213 SOLUTION TOPICAL at 06:52

## 2022-12-26 RX ADMIN — Medication 50 MILLILITER(S): at 08:30

## 2022-12-26 NOTE — PROGRESS NOTE ADULT - ATTENDING COMMENTS
Pt. with hypervolemia, in the setting of ERIC.   Received IV diuretics over past 2 days, however still with volume overload and rising BUN and lethargic.   Plan for session of HD today after placement of new non tunneled dialysis catheter.

## 2022-12-26 NOTE — PROGRESS NOTE ADULT - ATTENDING COMMENTS
Remains tenuous.   Clinical picture suggestive of SIRS/sepsis.   Remains hypervolemic agree with fluid removal via iHD.   GDMT on hold due to borderline BP. Can reassess in the future.  We will follow the patient on as needed basis.

## 2022-12-26 NOTE — PROGRESS NOTE ADULT - ASSESSMENT
61 YO M with a history of CAD s/p 4v CABG ~2019 in [facility], DM2 (A1c 7.3%), and HTN who initially presented to OSH with abdominal pain and n/v and found to have pancreatitis with lipase > 3000 though also with elevated troponins. CT abdomen revealed acute pancreatitis and his initial LVEF was 40-45%. He developed MSOF with hypoxic respiratory failure requiring intubation and ERIC and went into hypoxic PEA arrest requiring ACLS and due to persistent hypoxia and hypotension on pressors after ROSC was cannulated to [facility] ECM[facility] (LFV, RFA, no anterograde perfusion catheter) on 11/25. Notably CTH that day revealed small subdural hemorrhage.     His post arrest TTE on 11/26 showed a signficantly worse LVEF of 5-10% with an AV that was only minimally opening. Since then he has had improvement in his LV function (now ~35-40%) and improved pulsatility while tolerating progressive slow ECMO weans. ECMO turndown (note in chart 11/30) was reassuring for ability to decannulate to IABP/inotropes. LHC 12/06 showed closure of his 3 vein grafts with graft to LAD patent though with distal native disease. IABP placed, ECMO successfully decannulated 12/08 (transfused 2u pRBCs during). His ARDS is improving. Trach placed. now off IABP and inotropes. Started on GDMT. Still on HD.     Previous cardiac studies:  TTE 12/19/22 - EF 24%, LVIDd 5.6, normal RV function, estimated pasp 45mmhg  LHC (12/06/22) - patent LIMA-LAD, RCA , LCx , aortogram w/ closure of 3 vein grafts, LVEDP 22 mm Hg, left-to-left and left-to-right collaterals  TTE (12/03/22) - mod-to-sev segmental LVSD (inferolateral, inferior, inferoseptal hypokinesis) 61 YO M with a history of CAD s/p 4v CABG ~2019 in [facility], DM2 (A1c 7.3%), and HTN who initially presented to OSH with abdominal pain and n/v and found to have pancreatitis with lipase > 3000 though also with elevated troponins. CT abdomen revealed acute pancreatitis and his initial LVEF was 40-45%. He developed MSOF with hypoxic respiratory failure requiring intubation and ERIC and went into hypoxic PEA arrest requiring ACLS and due to persistent hypoxia and hypotension on pressors after ROSC was cannulated to [facility] ECMO (LFV, RFA, no anterograde perfusion catheter) on 11/25. Notably CTH that day revealed small subdural hemorrhage.     His post arrest TTE on 11/26 showed a signficantly worse LVEF of 5-10% with an AV that was only minimally opening. Since then he has had improvement in his LV function (now ~35-40%) and improved pulsatility while tolerating progressive slow ECMO weans. ECMO turndown (note in chart 11/30) was reassuring for ability to decannulate to IABP/inotropes. LHC 12/06 showed closure of his 3 vein grafts with graft to LAD patent though with distal native disease. IABP placed, ECMO successfully decannulated 12/08 (transfused 2u pRBCs during). His ARDS is improving. Trach placed. now off IABP and inotropes. Started on GDMT. Still on HD.     Previous cardiac studies:  TTE 12/19/22 - EF 24%, LVIDd 5.6, normal RV function, estimated pasp 45mmhg  LHC (12/06/22) - patent LIMA-LAD, RCA , LCx , aortogram w/ closure of 3 vein grafts, LVEDP 22 mm Hg, left-to-left and left-to-right collaterals  TTE (12/03/22) - mod-to-sev segmental LVSD (inferolateral, inferior, inferoseptal hypokinesis) 63 YO M with a history of CAD s/p 4v CABG ~2019 in [facility], DM2 (A1c 7.3%), and HTN who initially presented to OSH with abdominal pain and n/v and found to have pancreatitis with lipase > 3000 though also with elevated troponins. CT abdomen revealed acute pancreatitis and his initial LVEF was 40-45%. He developed MSOF with hypoxic respiratory failure requiring intubation and ERIC and went into hypoxic PEA arrest requiring ACLS and due to persistent hypoxia and hypotension on pressors after ROSC was cannulated to [facility] ECMO (LFV, RFA, no anterograde perfusion catheter) on 11/25. Notably CTH that day revealed small subdural hemorrhage.     His post arrest TTE on 11/26 showed a signficantly worse LVEF of 5-10% with an AV that was only minimally opening. Since then he has had improvement in his LV function (now ~35-40%) and improved pulsatility while tolerating progressive slow ECMO weans. ECMO turndown (note in chart 11/30) was reassuring for ability to decannulate to IABP/inotropes. LHC 12/06 showed closure of his 3 vein grafts with graft to LAD patent though with distal native disease. IABP placed, ECMO successfully decannulated 12/08 (transfused 2u pRBCs during). His ARDS is improving. Trach placed. now off IABP and inotropes. Started on GDMT. Still on HD.     Previous cardiac studies:  TTE 12/19/22 - EF 24%, LVIDd 5.6, normal RV function, estimated pasp 45mmhg  LHC (12/06/22) - patent LIMA-LAD, RCA , LCx , aortogram w/ closure of 3 vein grafts, LVEDP 22 mm Hg, left-to-left and left-to-right collaterals  TTE (12/03/22) - mod-to-sev segmental LVSD (inferolateral, inferior, inferoseptal hypokinesis) 63 YO M with a history of CAD s/p 4v CABG ~2019 in [facility], DM2 (A1c 7.3%), and HTN who initially presented to OSH with abdominal pain and n/v and found to have pancreatitis with lipase > 3000 though also with elevated troponins. CT abdomen revealed acute pancreatitis and his initial LVEF was 40-45%. He developed MSOF with hypoxic respiratory failure requiring intubation and ERIC and went into hypoxic PEA arrest requiring ACLS and due to persistent hypoxia and hypotension on pressors after ROSC was cannulated to [facility] ECMO (LFV, RFA, no anterograde perfusion catheter) on 11/25. Notably CTH that day revealed small subdural hemorrhage.     His post arrest TTE on 11/26 showed a signficantly worse LVEF of 5-10% with an AV that was only minimally opening. Since then he has had improvement in his LV function (now ~35-40%) and improved pulsatility while tolerating progressive slow ECMO weans. ECMO turndown (note in chart 11/30) was reassuring for ability to decannulate to IABP/inotropes. LHC 12/06 showed closure of his 3 vein grafts with graft to LAD patent though with distal native disease. IABP placed, ECMO successfully decannulated 12/08 (transfused 2u pRBCs during). His ARDS is improving. Trach placed. now off IABP and inotropes. Unable to tolerate GDMT due to soft BPs and renal dysfunction. Still on HD.     Previous cardiac studies:  TTE 12/19/22 - EF 24%, LVIDd 5.6, normal RV function, estimated pasp 45mmhg  LHC (12/06/22) - patent LIMA-LAD, RCA , LCx , aortogram w/ closure of 3 vein grafts, LVEDP 22 mm Hg, left-to-left and left-to-right collaterals  TTE (12/03/22) - mod-to-sev segmental LVSD (inferolateral, inferior, inferoseptal hypokinesis) 63 YO M with a history of CAD s/p 4v CABG ~2019 in [facility], DM2 (A1c 7.3%), and HTN who initially presented to OSH with abdominal pain and n/v and found to have pancreatitis with lipase > 3000 though also with elevated troponins. CT abdomen revealed acute pancreatitis and his initial LVEF was 40-45%. He developed MSOF with hypoxic respiratory failure requiring intubation and ERIC and went into hypoxic PEA arrest requiring ACLS and due to persistent hypoxia and hypotension on pressors after ROSC was cannulated to [facility] ECMO (LFV, RFA, no anterograde perfusion catheter) on 11/25. Notably CTH that day revealed small subdural hemorrhage.     His post arrest TTE on 11/26 showed a signficantly worse LVEF of 5-10% with an AV that was only minimally opening. Since then he has had improvement in his LV function (now ~35-40%) and improved pulsatility while tolerating progressive slow ECMO weans. ECMO turndown (note in chart 11/30) was reassuring for ability to decannulate to IABP/inotropes. LHC 12/06 showed closure of his 3 vein grafts with graft to LAD patent though with distal native disease. IABP placed, ECMO successfully decannulated 12/08 (transfused 2u pRBCs during). His ARDS is improving. Trach placed. now off IABP and inotropes. Unable to tolerate GDMT due to soft BPs and renal dysfunction. Still on HD.     Previous cardiac studies:  TTE 12/19/22 - EF 24%, LVIDd 5.6, normal RV function, estimated pasp 45mmhg  LHC (12/06/22) - patent LIMA-LAD, RCA , LCx , aortogram w/ closure of 3 vein grafts, LVEDP 22 mm Hg, left-to-left and left-to-right collaterals  TTE (12/03/22) - mod-to-sev segmental LVSD (inferolateral, inferior, inferoseptal hypokinesis) 61 YO M with a history of CAD s/p 4v CABG ~2019 in [facility], DM2 (A1c 7.3%), and HTN who initially presented to OSH with abdominal pain and n/v and found to have pancreatitis with lipase > 3000 though also with elevated troponins. CT abdomen revealed acute pancreatitis and his initial LVEF was 40-45%. He developed MSOF with hypoxic respiratory failure requiring intubation and ERIC and went into hypoxic PEA arrest requiring ACLS and due to persistent hypoxia and hypotension on pressors after ROSC was cannulated to [facility] ECMO (LFV, RFA, no anterograde perfusion catheter) on 11/25. Notably CTH that day revealed small subdural hemorrhage.     His post arrest TTE on 11/26 showed a signficantly worse LVEF of 5-10% with an AV that was only minimally opening. Since then he has had improvement in his LV function (now ~35-40%) and improved pulsatility while tolerating progressive slow ECMO weans. ECMO turndown (note in chart 11/30) was reassuring for ability to decannulate to IABP/inotropes. LHC 12/06 showed closure of his 3 vein grafts with graft to LAD patent though with distal native disease. IABP placed, ECMO successfully decannulated 12/08 (transfused 2u pRBCs during). His ARDS is improving. Trach placed. now off IABP and inotropes. Unable to tolerate GDMT due to soft BPs and renal dysfunction. Still on HD.     Previous cardiac studies:  TTE 12/19/22 - EF 24%, LVIDd 5.6, normal RV function, estimated pasp 45mmhg  LHC (12/06/22) - patent LIMA-LAD, RCA , LCx , aortogram w/ closure of 3 vein grafts, LVEDP 22 mm Hg, left-to-left and left-to-right collaterals  TTE (12/03/22) - mod-to-sev segmental LVSD (inferolateral, inferior, inferoseptal hypokinesis) 63 YO M with a history of CAD s/p 4v CABG ~2019 in [facility], DM2 (A1c 7.3%), and HTN who initially presented to OSH with abdominal pain and n/v and found to have pancreatitis with lipase > 3000 though also with elevated troponins. CT abdomen revealed acute pancreatitis and his initial LVEF was 40-45%. He developed MSOF with hypoxic respiratory failure requiring intubation and ERIC and went into hypoxic PEA arrest requiring ACLS and due to persistent hypoxia and hypotension on pressors after ROSC was cannulated to [facility] ECMO (LFV, RFA, no anterograde perfusion catheter) on 11/25. Notably CTH that day revealed small subdural hemorrhage.     His post arrest TTE on 11/26 showed a signficantly worse LVEF of 5-10% with an AV that was only minimally opening. Since then he has had improvement in his LV function (now ~35-40%) and improved pulsatility while tolerating progressive slow ECMO weans. ECMO turndown (note in chart 11/30) was reassuring for ability to decannulate to IABP/inotropes. LHC 12/06 showed closure of his 3 vein grafts with graft to LAD patent though with distal native disease. IABP placed, ECMO successfully decannulated 12/08 (transfused 2u pRBCs during). His ARDS is improving. Trach placed. now off IABP and inotropes. Unable to tolerate GDMT due to soft BPs and renal dysfunction. He has urine output but not enough to stay euvolemic. Remains on intermittent HD.     Previous cardiac studies:  TTE 12/19/22 - EF 24%, LVIDd 5.6, normal RV function, estimated pasp 45mmhg  LHC (12/06/22) - patent LIMA-LAD, RCA , LCx , aortogram w/ closure of 3 vein grafts, LVEDP 22 mm Hg, left-to-left and left-to-right collaterals  TTE (12/03/22) - mod-to-sev segmental LVSD (inferolateral, inferior, inferoseptal hypokinesis) 61 YO M with a history of CAD s/p 4v CABG ~2019 in [facility], DM2 (A1c 7.3%), and HTN who initially presented to OSH with abdominal pain and n/v and found to have pancreatitis with lipase > 3000 though also with elevated troponins. CT abdomen revealed acute pancreatitis and his initial LVEF was 40-45%. He developed MSOF with hypoxic respiratory failure requiring intubation and ERIC and went into hypoxic PEA arrest requiring ACLS and due to persistent hypoxia and hypotension on pressors after ROSC was cannulated to [facility] ECMO (LFV, RFA, no anterograde perfusion catheter) on 11/25. Notably CTH that day revealed small subdural hemorrhage.     His post arrest TTE on 11/26 showed a signficantly worse LVEF of 5-10% with an AV that was only minimally opening. Since then he has had improvement in his LV function (now ~35-40%) and improved pulsatility while tolerating progressive slow ECMO weans. ECMO turndown (note in chart 11/30) was reassuring for ability to decannulate to IABP/inotropes. LHC 12/06 showed closure of his 3 vein grafts with graft to LAD patent though with distal native disease. IABP placed, ECMO successfully decannulated 12/08 (transfused 2u pRBCs during). His ARDS is improving. Trach placed. now off IABP and inotropes. Unable to tolerate GDMT due to soft BPs and renal dysfunction. He has urine output but not enough to stay euvolemic. Remains on intermittent HD.     Previous cardiac studies:  TTE 12/19/22 - EF 24%, LVIDd 5.6, normal RV function, estimated pasp 45mmhg  LHC (12/06/22) - patent LIMA-LAD, RCA , LCx , aortogram w/ closure of 3 vein grafts, LVEDP 22 mm Hg, left-to-left and left-to-right collaterals  TTE (12/03/22) - mod-to-sev segmental LVSD (inferolateral, inferior, inferoseptal hypokinesis) 61 YO M with a history of CAD s/p 4v CABG ~2019 in [facility], DM2 (A1c 7.3%), and HTN who initially presented to OSH with abdominal pain and n/v and found to have pancreatitis with lipase > 3000 though also with elevated troponins. CT abdomen revealed acute pancreatitis and his initial LVEF was 40-45%. He developed MSOF with hypoxic respiratory failure requiring intubation and ERIC and went into hypoxic PEA arrest requiring ACLS and due to persistent hypoxia and hypotension on pressors after ROSC was cannulated to [facility] ECMO (LFV, RFA, no anterograde perfusion catheter) on 11/25. Notably CTH that day revealed small subdural hemorrhage.     His post arrest TTE on 11/26 showed a signficantly worse LVEF of 5-10% with an AV that was only minimally opening. Since then he has had improvement in his LV function (now ~35-40%) and improved pulsatility while tolerating progressive slow ECMO weans. ECMO turndown (note in chart 11/30) was reassuring for ability to decannulate to IABP/inotropes. LHC 12/06 showed closure of his 3 vein grafts with graft to LAD patent though with distal native disease. IABP placed, ECMO successfully decannulated 12/08 (transfused 2u pRBCs during). His ARDS is improving. Trach placed. now off IABP and inotropes. Unable to tolerate GDMT due to soft BPs and renal dysfunction. He has urine output but not enough to stay euvolemic. Remains on intermittent HD.     Previous cardiac studies:  TTE 12/19/22 - EF 24%, LVIDd 5.6, normal RV function, estimated pasp 45mmhg  LHC (12/06/22) - patent LIMA-LAD, RCA , LCx , aortogram w/ closure of 3 vein grafts, LVEDP 22 mm Hg, left-to-left and left-to-right collaterals  TTE (12/03/22) - mod-to-sev segmental LVSD (inferolateral, inferior, inferoseptal hypokinesis)

## 2022-12-26 NOTE — PROGRESS NOTE ADULT - PROBLEM SELECTOR PLAN 2
- ECMO decannulated 12/08 after successful wean with IABP and inotropes  - Had severe MR post ECMO removal which prevented appropriate weaning of IABP. Underwent mitral clip on 12/16 with reduction of MR to mild/moderate.

## 2022-12-26 NOTE — PROGRESS NOTE ADULT - PROBLEM SELECTOR PLAN 5
- likely arrest associated ATN, hypovolemia; nonoliguric   - was on CVVH now on HD   - appreciate nephrology recommendations   - will hold off on addition of ACE/ARB.

## 2022-12-26 NOTE — PROGRESS NOTE ADULT - PROBLEM SELECTOR PLAN 1
- ischemic with most recent TTE showing EF 24%  - lopressor held   - Will hold off on ACE/ARB/ARNi given ongoing renal pathology.    - appreciate IV volume removal via HD  - We will follow him intermittently. - ischemic with most recent TTE showing EF 24%  - lopressor held   - Will hold off on ACE/ARB/ARNi given ongoing renal pathology.    - appreciate IV volume removal via HD  - We will follow him intermittently, please call if needed. - ischemic with most recent TTE showing EF 24%  - GDMT on hold due to low BP  - Will hold off on ACE/ARB/ARNi given ongoing renal pathology.    - appreciate IV volume removal via HD  - OK to continue diuresis lasix 20mg/h, if nephrology agrees  - We will follow him on as needed basis, please call if needed.

## 2022-12-26 NOTE — PROGRESS NOTE ADULT - SUBJECTIVE AND OBJECTIVE BOX
Follow Up:      Interval History/ROS: Patient is a 62y old Male who presents with a chief complaint of Acute cholecystitis, gallstone pancreatitis.  ID re-consulted for fevers.    REVIEW OF SYSTEMS  [  ] ROS unobtainable because:    [ x ] All other systems negative except as noted below    Constitutional:  [ ] fever [ ] chills  [ ] weight loss  [ ]night sweat  [ ]poor appetite/PO intake [ ]fatigue   Skin:  [ ] rash [ ] phlebitis	  Eyes: [ ] icterus [ ] pain  [ ] discharge	  ENMT: [ ] sore throat  [ ] thrush [ ] ulcers [ ] exudates [ ]anosmia  Respiratory: [ ] dyspnea [ ] hemoptysis [ ] cough [ ] sputum	  Cardiovascular:  [ ] chest pain [ ] palpitations [ ] edema	  Gastrointestinal:  [ ] nausea [ ] vomiting [ ] diarrhea [ ] constipation [ ] pain	  Genitourinary:  [ ] dysuria [ ] frequency [ ] hematuria [ ] discharge [ ] flank pain  [ ] incontinence  Musculoskeletal:  [ ] myalgias [ ] arthralgias [ ] arthritis  [ ] back pain  Neurological:  [ ] headache [ ] weakness [ ] seizures  [ ] confusion/altered mental status    Allergies  No Known Allergies    ANTIMICROBIALS:    meropenem  IVPB 500 every 24 hours    OTHER MEDS: MEDICATIONS  (STANDING):  acetaminophen    Suspension .. 650 every 6 hours PRN  aMIOdarone    Tablet 200 daily  aspirin  chewable 81 daily  atorvastatin 80 at bedtime  heparin   Injectable 5000 every 8 hours  insulin lispro (ADMELOG) corrective regimen sliding scale  every 6 hours  pantoprazole  Injectable 40 daily  senna 2 at bedtime    Vital Signs Last 24 Hrs  T(F): 101.1 (22 @ 04:00), Max: 101.3 (22 @ 03:00)    Vital Signs Last 24 Hrs  HR: 118 (22 @ 13:00) (103 - 130)  BP: 114/66 (22 @ 19:00) (99/55 - 117/65)  RR: 32 (22 @ 13:00)  SpO2: 99% (22 @ 13:00) (95% - 100%)  Wt(kg): --    EXAM:    GA: NAD  HEENT: oral cavity no lesion  CV: nl S1/S2, no RMG  Lungs: CTAB, no distress  Abd: BS+, soft, nontender, no rebounding pain  Ext: no edema  Neuro: No focal deficits  Skin: Intact  IV: no phlebitis    Labs:                        8.5    15.01 )-----------( 149      ( 26 Dec 2022 00:25 )             27.3         136  |  94<L>  |  93<H>  ----------------------------<  226<H>  5.0   |  24  |  5.35<H>    Ca    8.4      26 Dec 2022 00:25  Phos  6.6       Mg     2.9         TPro  7.1  /  Alb  3.4  /  TBili  0.7  /  DBili  x   /  AST  24  /  ALT  23  /  AlkPhos  94      WBC Trend:  WBC Count: 15.01 (22 @ 00:25)  WBC Count: 11.14 (22 @ 00:28)  WBC Count: 12.07 (22 @ 00:43)  WBC Count: 13.24 (22 @ 01:22)    Creatine Trend:  Creatinine, Serum: 5.35 ()  Creatinine, Serum: 5.19 ()  Creatinine, Serum: 4.59 ()  Creatinine, Serum: 3.22 ()    Liver Biochemical Testing Trend:  Alanine Aminotransferase (ALT/SGPT): 23 ()  Alanine Aminotransferase (ALT/SGPT): 22 ()  Alanine Aminotransferase (ALT/SGPT): 23 ()  Alanine Aminotransferase (ALT/SGPT): 27 ()  Alanine Aminotransferase (ALT/SGPT): 33 ()  Aspartate Aminotransferase (AST/SGOT): 24 (22 @ 00:25)  Aspartate Aminotransferase (AST/SGOT): 24 (22 @ 15:24)  Aspartate Aminotransferase (AST/SGOT): 24 (22 @ 00:28)  Aspartate Aminotransferase (AST/SGOT): 32 (22 @ 00:43)  Aspartate Aminotransferase (AST/SGOT): 40 (22 @ 01:22)  Bilirubin Total, Serum: 0.7 ()  Bilirubin Total, Serum: 0.7 (12-25)  Bilirubin Total, Serum: 0.7 (12-25)  Bilirubin Total, Serum: 0.8 (12-24)  Bilirubin Total, Serum: 0.6 (12-23)    Trend LDH  22 @ 02:16  39<L>  12-15-22 @ 00:46  62  22 @ 00:34  34<L>  22 @ 01:08  61  22 @ 00:36  456<H>    Urinalysis Basic - ( 26 Dec 2022 13:19 )    Color: Yellow / Appearance: Clear / S.013 / pH: x  Gluc: x / Ketone: Negative  / Bili: Negative / Urobili: Negative   Blood: x / Protein: 100 mg/dL / Nitrite: Negative   Leuk Esterase: Moderate / RBC: >50 /hpf / WBC 35 /HPF   Sq Epi: x / Non Sq Epi: 2 / Bacteria: Few    MICROBIOLOGY:  Vancomycin Level, Trough: 7.6 (12-15 @ 06:50)  Vancomycin Level, Trough: 34.3 ( @ 06:19)  Vancomycin Level, Trough: 12.3 ( @ 05:37)  Vancomycin Level, Trough: 16.4 (12 @ 05:16)  Vancomycin Level, Trough: 10.1 (11 @ 04:47)  Vancomycin Level, Trough: 4.1 (12-10 @ 15:55)    MRSA PCR Result.: NotDetec (22 @ 16:59)    Culture - Sputum (collected 19 Dec 2022 02:34)  Source: Trach Asp Tracheal Aspirate  Final Report:    Normal Respiratory Christy present    Culture - Blood (collected 18 Dec 2022 04:12)  Source: .Blood Blood-Peripheral  Final Report:    No Growth Final    Culture - Blood (collected 18 Dec 2022 04:12)  Source: .Blood Blood-Peripheral  Final Report:    No Growth Final    Culture - Sputum (collected 15 Dec 2022 17:46)  Source: .Sputum Sputum  Final Report:    Normal Respiratory Christy present    Culture - Blood (collected 15 Dec 2022 17:35)  Source: .Blood Blood-Peripheral  Final Report:    No Growth Final    Culture - Blood (collected 15 Dec 2022 15:56)  Source: .Blood Blood-Peripheral  Final Report:    No Growth Final    Culture - Urine (collected 11 Dec 2022 00:26)  Source: Catheterized Catheterized  Final Report:    No growth    Culture - Blood (collected 10 Dec 2022 15:45)  Source: .Blood Blood-Peripheral  Final Report:    No Growth Final    Culture - Blood (collected 10 Dec 2022 15:30)  Source: .Blood Blood-Peripheral  Final Report:    No Growth Final    Culture - Sputum (collected 10 Dec 2022 10:49)  Source: ET Tube ET Tube  Final Report:    Normal Respiratory Christy present    COVID-19 PCR: NotDetec (12-15-22 @ 20:31)  COVID-19 PCR: NotDetec (22 @ 19:57)  COVID-19 PCR: NotDetec (22 @ 22:07)  COVID-19 PCR: NotDetec (22 @ 07:55)    Blood Gas Arterial, Lactate: 1.6 ( @ 23:49)  Blood Gas Arterial, Lactate: 1.2 ( @ 00:14)  Blood Gas Venous - Lactate: 1.1 ( @ 00:14)  Blood Gas Arterial, Lactate: 1.2 ( @ 00:30)    RADIOLOGY:  imaging below personally reviewed    Xray Chest 1 View- PORTABLE-Urgent:  (26 Dec 2022 07:59)  Xray Chest 1 View- PORTABLE-Urgent:  (23 Dec 2022 15:46)                 Follow Up:      Interval History/ROS: Patient is a 62y old Male who presents with a chief complaint of Acute cholecystitis, gallstone pancreatitis.  ID re-consulted for fevers.    REVIEW OF SYSTEMS  [ ] ROS unobtainable because:    [ X] All other systems negative except as noted below    Constitutional:  [ ] fever [ ] chills  [ ] weight loss  [ ]night sweat  [ ]poor appetite/PO intake [ ]fatigue   Skin:  [ ] rash [ ] phlebitis	  Eyes: [ ] icterus [ ] pain  [ ] discharge	  ENMT: [ ] sore throat  [ ] thrush [ ] ulcers [ ] exudates [ ]anosmia  Respiratory: [ ] dyspnea [ ] hemoptysis [ ] cough [ ] sputum	  Cardiovascular:  [ ] chest pain [ ] palpitations [ ] edema	  Gastrointestinal:  [ ] nausea [ ] vomiting [ ] diarrhea [ ] constipation [ ] pain	  Genitourinary:  [ ] dysuria [ ] frequency [ ] hematuria [ ] discharge [ ] flank pain  [ ] incontinence  Musculoskeletal:  [ ] myalgias [ ] arthralgias [ ] arthritis  [ ] back pain  Neurological:  [ ] headache [ ] weakness [ ] seizures  [ ] confusion/altered mental status    Allergies  No Known Allergies    ANTIMICROBIALS:    meropenem  IVPB 500 every 24 hours    OTHER MEDS: MEDICATIONS  (STANDING):  acetaminophen    Suspension .. 650 every 6 hours PRN  aMIOdarone    Tablet 200 daily  aspirin  chewable 81 daily  atorvastatin 80 at bedtime  heparin   Injectable 5000 every 8 hours  insulin lispro (ADMELOG) corrective regimen sliding scale  every 6 hours  pantoprazole  Injectable 40 daily  senna 2 at bedtime    Vital Signs Last 24 Hrs  T(F): 101.1 (22 @ 04:00), Max: 101.3 (22 @ 03:00)    Vital Signs Last 24 Hrs  HR: 118 (22 @ 13:00) (103 - 130)  BP: 114/66 (22 @ 19:00) (99/55 - 117/65)  RR: 32 (22 @ 13:00)  SpO2: 99% (22 @ 13:00) (95% - 100%)  Wt(kg): --    EXAM:    GA: NAD  HEENT: oral cavity no lesion  CV: nl S1/S2, no RMG  Lungs: Trach with Trach collar with minimal secretions  Abd: BS+, soft, nontender, no rebounding pain, michel   Ext: no edema  Neuro: No focal deficits  Skin: Intact  IV: L femoral central line, R IJ line, R axillary dave    Labs:                        8.5    15.01 )-----------( 149      ( 26 Dec 2022 00:25 )             27.3         136  |  94<L>  |  93<H>  ----------------------------<  226<H>  5.0   |  24  |  5.35<H>    Ca    8.4      26 Dec 2022 00:25  Phos  6.6       Mg     2.9         TPro  7.1  /  Alb  3.4  /  TBili  0.7  /  DBili  x   /  AST  24  /  ALT  23  /  AlkPhos  94      WBC Trend:  WBC Count: 15.01 (22 @ 00:25)  WBC Count: 11.14 (22 @ 00:28)  WBC Count: 12.07 (22 @ 00:43)  WBC Count: 13.24 (22 @ 01:22)    Creatine Trend:  Creatinine, Serum: 5.35 ()  Creatinine, Serum: 5.19 ()  Creatinine, Serum: 4.59 ()  Creatinine, Serum: 3.22 ()    Liver Biochemical Testing Trend:  Alanine Aminotransferase (ALT/SGPT): 23 ()  Alanine Aminotransferase (ALT/SGPT): 22 ()  Alanine Aminotransferase (ALT/SGPT): 23 ()  Alanine Aminotransferase (ALT/SGPT): 27 ()  Alanine Aminotransferase (ALT/SGPT): 33 ()  Aspartate Aminotransferase (AST/SGOT): 24 (22 @ 00:25)  Aspartate Aminotransferase (AST/SGOT): 24 (22 @ 15:24)  Aspartate Aminotransferase (AST/SGOT): 24 (22 @ 00:28)  Aspartate Aminotransferase (AST/SGOT): 32 (22 @ 00:43)  Aspartate Aminotransferase (AST/SGOT): 40 (22 @ 01:22)  Bilirubin Total, Serum: 0.7 (12-26)  Bilirubin Total, Serum: 0.7 (12-25)  Bilirubin Total, Serum: 0.7 (12-25)  Bilirubin Total, Serum: 0.8 (12-24)  Bilirubin Total, Serum: 0.6 (12-23)    Trend LDH  22 @ 02:16  39<L>  12-15-22 @ 00:46  62  22 @ 00:34  34<L>  22 @ 01:08  61  22 @ 00:36  456<H>    Urinalysis Basic - ( 26 Dec 2022 13:19 )    Color: Yellow / Appearance: Clear / S.013 / pH: x  Gluc: x / Ketone: Negative  / Bili: Negative / Urobili: Negative   Blood: x / Protein: 100 mg/dL / Nitrite: Negative   Leuk Esterase: Moderate / RBC: >50 /hpf / WBC 35 /HPF   Sq Epi: x / Non Sq Epi: 2 / Bacteria: Few    MICROBIOLOGY:  Vancomycin Level, Trough: 7.6 (12-15 @ 06:50)  Vancomycin Level, Trough: 34.3 ( @ 06:19)  Vancomycin Level, Trough: 12.3 (-13 @ 05:37)  Vancomycin Level, Trough: 16.4 (-12 @ 05:16)  Vancomycin Level, Trough: 10.1 (12-11 @ 04:47)  Vancomycin Level, Trough: 4.1 (12-10 @ 15:55)    MRSA PCR Result.: NotDetec (22 @ 16:59)    Culture - Sputum (collected 19 Dec 2022 02:34)  Source: Trach Asp Tracheal Aspirate  Final Report:    Normal Respiratory Christy present    Culture - Blood (collected 18 Dec 2022 04:12)  Source: .Blood Blood-Peripheral  Final Report:    No Growth Final    Culture - Blood (collected 18 Dec 2022 04:12)  Source: .Blood Blood-Peripheral  Final Report:    No Growth Final    Culture - Sputum (collected 15 Dec 2022 17:46)  Source: .Sputum Sputum  Final Report:    Normal Respiratory Christy present    Culture - Blood (collected 15 Dec 2022 17:35)  Source: .Blood Blood-Peripheral  Final Report:    No Growth Final    Culture - Blood (collected 15 Dec 2022 15:56)  Source: .Blood Blood-Peripheral  Final Report:    No Growth Final    Culture - Urine (collected 11 Dec 2022 00:26)  Source: Catheterized Catheterized  Final Report:    No growth    Culture - Blood (collected 10 Dec 2022 15:45)  Source: .Blood Blood-Peripheral  Final Report:    No Growth Final    Culture - Blood (collected 10 Dec 2022 15:30)  Source: .Blood Blood-Peripheral  Final Report:    No Growth Final    Culture - Sputum (collected 10 Dec 2022 10:49)  Source: ET Tube ET Tube  Final Report:    Normal Respiratory Christy present    COVID-19 PCR: NotDetec (12-15-22 @ 20:31)  COVID-19 PCR: NotDetec (22 @ 19:57)  COVID-19 PCR: NotDetec (22 @ 22:07)  COVID- PCR: NotDetec (22 @ 07:55)    Blood Gas Arterial, Lactate: 1.6 ( @ 23:49)  Blood Gas Arterial, Lactate: 1.2 ( @ 00:14)  Blood Gas Venous - Lactate: 1.1 ( @ 00:14)  Blood Gas Arterial, Lactate: 1.2 ( @ 00:30)    RADIOLOGY:  imaging below personally reviewed    Xray Chest 1 View- PORTABLE-Urgent:  (26 Dec 2022 07:59)  Xray Chest 1 View- PORTABLE-Urgent:  (23 Dec 2022 15:46)

## 2022-12-26 NOTE — PROGRESS NOTE ADULT - PROBLEM SELECTOR PLAN 1
Pt with non oliguric ERIC/ ATN in the setting of STEMI, cardiac arrest & cardiogenic shock  SCr on arrival was 2.15; trended down to hector 1.6 on 11/25; slowly trended up & increased to 3.95 11/28.   Pt initiated on CRRT/CVVHDF to optimize volume and electrolytes on 12/5/22. Pt likely with ATN. Pt underwent decannulation of ECMO on 12/8/22 and was taken off CRRT. Pt reinitiated on CRRT overnight on 12/9/22 for volume overload. s/p trach on 12/16. CRRT stopped again 12/19. Bladder scan daily. Now with [facility]. On Lasix gtt s/p 10mg of Metolazone daily since 12/23. Plan for HD today. Will likely need tunneled catheter placed for ongoing dialysis needs once cleared from infectious standpoint. Monitor labs and urine output. Avoid any potential nephrotoxins. Dose medications as per HD.

## 2022-12-26 NOTE — PROGRESS NOTE ADULT - ATTENDING COMMENTS
62-year-old male with a past medical history Mo significant for CAD status post CABG who was transferred from [facility] to the [facility] to [place] due to STEMI.  Following transfer, found to have pancreatitis and cholecystitis however then developed respiratory failure and transferred to the ICU due to ARDS.  Patient then required ECMO due to worsening ARDS and cardiogenic shock, was able to be decannulated on 12/8/2022 with aortic balloon pump being placed, this was removed 12/17/2022.  On 12/16/2022 placed on trach collar.  ID was previously following due to concern for sepsis, pancreatitis.  Patient was given course of vancomycin and meropenem.  Last seen on 12/15/2022 by ID.    ID now recalled due to fevers.  Patient found to be febrile, tachycardic and hypotensive with a leukocytosis up to 15.  Infectious work-up was obtained, no evidence for infection at this time.  Lines were replaced on 12/25/2022 after development of fevers.  Latest chest x-ray on 12/26/2022 shows pulmonary vascular congestion.  Patient received 1 dose of vancomycin and then continued on meropenem.    Impression  #Fever and leukocytosis  Unclear etiology at this time  We will continue with empiric meropenem    Recommendations  Continue meropenem as above  Follow pending blood cultures  Follow pending sputum culture  Obtain MRSA PCR  Consider CT abdomen/pelvis imaging if fevers and leukocytosis persist  Follow fever curve and WBC count    Ricky Burt MD  Division of Infectious Diseases  Available on Teams 62-year-old male with a past medical history Mo significant for CAD status post CABG who was transferred from [facility] to the [facility] to [place] due to STEMI.  Following transfer, found to have pancreatitis and cholecystitis however then developed respiratory failure and transferred to the ICU due to ARDS.  Patient then required ECMO due to worsening ARDS and cardiogenic shock, was able to be decannulated on 12/8/2022 with aortic balloon pump being placed, this was removed 12/17/2022.  On 12/16/2022 placed on trach collar.  ID was previously following due to concern for sepsis, pancreatitis.  Patient was given course of vancomycin and meropenem.  Last seen on 12/15/2022 by ID.    ID now recalled due to fevers.  Patient found to be febrile, tachycardic and hypotensive with a leukocytosis up to 15.  Infectious work-up was obtained, no evidence for infection at this time.  Lines were replaced on 12/25/2022 after development of fevers.  Latest chest x-ray on 12/26/2022 shows pulmonary vascular congestion.  Patient received 1 dose of vancomycin and then continued on meropenem.    Impression  #Fever and leukocytosis  Unclear etiology at this time  We will continue with empiric meropenem    Recommendations  Continue meropenem as above  Follow pending blood cultures  Follow pending sputum culture  Obtain MRSA PCR  Consider CT abdomen/pelvis imaging if fevers and leukocytosis persist  Follow fever curve and WBC count    Ricky Burt MD  Division of Infectious Diseases  Available on Teams 62-year-old male with a past medical history Mo significant for CAD status post CABG who was transferred from [facility] to the [facility] to [place] due to STEMI.  Following transfer, found to have pancreatitis and cholecystitis however then developed respiratory failure and transferred to the ICU due to ARDS.  Patient then required ECMO due to worsening ARDS and cardiogenic shock, was able to be decannulated on 12/8/2022 with aortic balloon pump being placed, this was removed 12/17/2022.  On 12/16/2022 placed on trach collar.  ID was previously following due to concern for sepsis, pancreatitis.  Patient was given course of vancomycin and meropenem.  Last seen on 12/15/2022 by ID.    ID now recalled due to fevers.  Patient found to be febrile, tachycardic and hypotensive with a leukocytosis up to 15.  Infectious work-up was obtained, no evidence for infection at this time.  Lines were replaced on 12/25/2022 after development of fevers.  Latest chest x-ray on 12/26/2022 shows pulmonary vascular congestion.  Patient received 1 dose of vancomycin and then continued on meropenem.    Impression  #Fever and leukocytosis  Unclear etiology at this time  We will continue with empiric meropenem    Recommendations  Continue meropenem as above  Follow pending blood cultures  Follow pending sputum culture  Obtain MRSA PCR  Consider CT abdomen/pelvis imaging if fevers and leukocytosis persist  Follow fever curve and WBC count    Ricky Burt MD  Division of Infectious Diseases  Available on Teams

## 2022-12-26 NOTE — PROGRESS NOTE ADULT - PROBLEM SELECTOR PLAN 3
- troponin peaked at > 60722   - [facility] 12/06 with IABP placement revealed that 3 grafts are closed w/ distal native disease from remaining LAD graft  - continue statin and aspirin. - troponin peaked at > 39064   - [facility] 12/06 with IABP placement revealed that 3 grafts are closed w/ distal native disease from remaining LAD graft  - continue statin and aspirin. - troponin peaked at > 38395   - [facility] 12/06 with IABP placement revealed that 3 grafts are closed w/ distal native disease from remaining LAD graft  - continue statin and aspirin.

## 2022-12-26 NOTE — PROVIDER CONTACT NOTE (HYPOGLYCEMIA EVENT) - NS PROVIDER CONTACT BACKGROUND-HYPO
Age: 62y    Gender: Male    POCT Blood Glucose:51 (12-26-22 @ 06:36)  33 (12-26-22 @ 05:50)    121 mg/dL (12-26-22 @ 08:11)  158 mg/dL (12-26-22 @ 07:02)  51 mg/dL (12-26-22 @ 06:36)  51 mg/dL (12-26-22 @ 06:33)  33 mg/dL (12-26-22 @ 05:50)  31 mg/dL (12-26-22 @ 05:48)  231 mg/dL (12-25-22 @ 23:48)  200 mg/dL (12-25-22 @ 17:27)    0000 : 4u admelog, 7u NPH given, results and insulin dosing discussed with MD Lilly, no insulin gtts at that time as per team.   Feeds held at 0500 for line changes, 0548 BG 31, 0550 BG 33, 1 amp D50, D5 gtts at 20cc/hr given.  0633 BG 51, 0636 BG 51, 1 amp D50 given, D5 gtts increased to 30cc/hr, feeds restarted Glucerna 1.2 @ 65 cc/hr  0702 , NP Lilly Chamorro MD aware of all labs and discussed. NPH D/c, admelog held.     eMAR:  atorvastatin   80 milliGRAM(s) Oral (12-25-22 @ 22:42)    dextrose 50% Injectable   50 milliLiter(s) IV Push (12-26-22 @ 08:30)    insulin lispro (ADMELOG) corrective regimen sliding scale   4 Unit(s) SubCutaneous (12-26-22 @ 00:05)   2 Unit(s) SubCutaneous (12-25-22 @ 17:30)   2 Unit(s) SubCutaneous (12-25-22 @ 11:04)    insulin NPH human recombinant   7 Unit(s) SubCutaneous (12-26-22 @ 00:05)   7 Unit(s) SubCutaneous (12-25-22 @ 17:29)   7 Unit(s) SubCutaneous (12-25-22 @ 11:04)     Age: 62y    Gender: Male    POCT Blood Glucose:51 (12-26-22 @ 06:36)  33 (12-26-22 @ 05:50)    121 mg/dL (12-26-22 @ 08:11)  158 mg/dL (12-26-22 @ 07:02)  51 mg/dL (12-26-22 @ 06:36)  51 mg/dL (12-26-22 @ 06:33)  33 mg/dL (12-26-22 @ 05:50)  31 mg/dL (12-26-22 @ 05:48)  231 mg/dL (12-25-22 @ 23:48)  200 mg/dL (12-25-22 @ 17:27)    0000 : 4u admelog, 7u NPH given, results and insulin dosing discussed with MD Lilly, no insulin gtts at that time as per team.   Feeds held at 0500 for line changes, 0548 BG 31, 0550 BG 33, 1 amp D50, D5 gtts at 20cc/hr given. Pt not following commands d/t brief sedation for line change.   0633 BG 51, 0636 BG 51, 1 amp D50 given, D5 gtts increased to 30cc/hr, feeds restarted Glucerna 1.2 @ 65 cc/hr. Pt lethargic but able to follow all commands, pupils E/R.   0702 , NP Lilly Chamorro MD aware of all labs and discussed. NPH D/c, admelog held. Pt alert, following all commands.     eMAR:  atorvastatin   80 milliGRAM(s) Oral (12-25-22 @ 22:42)    dextrose 50% Injectable   50 milliLiter(s) IV Push (12-26-22 @ 08:30)    insulin lispro (ADMELOG) corrective regimen sliding scale   4 Unit(s) SubCutaneous (12-26-22 @ 00:05)   2 Unit(s) SubCutaneous (12-25-22 @ 17:30)   2 Unit(s) SubCutaneous (12-25-22 @ 11:04)    insulin NPH human recombinant   7 Unit(s) SubCutaneous (12-26-22 @ 00:05)   7 Unit(s) SubCutaneous (12-25-22 @ 17:29)   7 Unit(s) SubCutaneous (12-25-22 @ 11:04)

## 2022-12-26 NOTE — PROGRESS NOTE ADULT - ASSESSMENT
62M CAD s/p CABG, DM, HTN, presented as a trasnfer from [place] for STEMI, found to have pancreatitis not necrotizing and cholecystitis on CT AP, admitted to to SICU for respiratory failure due to ARDS. Moved to the CTU for ECMO for cardiogenic shock vs ARDS, ECMO decannulated on 12/8 with IABP placement and mitral clip and removed on 12/17, s/p trac on 12/16/22, now on trach collar.    Febrile, tachycardic, hypotensive   Leukocytosis   CXR on 12/26: Slight improvement in the pulmonary vascular congestion  Blood Cx remained negative   Most recent Blood Cx on 12/18: NGTD  Trach Cx on 12/19: NGTD  LIJ central line replaced by R IJ central line on 12/25  S/P meropenem, vanco and diflucan  Received 1 dose of Vanc and started on Meropenem     #Sepsis   #Persistent leukocytosis   #Pancreatitis   #Cardiogenic shock     Recommendations:      PT TO BE SEEN. PRELIM NOTE  PENDING RECS. PLEASE WAIT FOR FINAL RECS AFTER DISCUSSION WITH ATTENDING#     62M CAD s/p CABG, DM, HTN, presented as a trasnfer from [place] for STEMI, found to have pancreatitis not necrotizing and cholecystitis on CT AP, admitted to to SICU for respiratory failure due to ARDS. Moved to the CTU for ECMO for cardiogenic shock vs ARDS, ECMO decannulated on 12/8 with IABP placement and mitral clip and removed on 12/17, s/p trac on 12/16/22, now on trach collar.    Febrile, tachycardic, hypotensive   Leukocytosis   CXR on 12/26: Slight improvement in the pulmonary vascular congestion  Blood Cx remained negative   Most recent Blood Cx on 12/18: NGTD  Trach Cx on 12/19: NGTD  LIJ central line replaced by R IJ central line on 12/25  S/P meropenem, vanco and diflucan  Received 1 dose of Vanc and started on Meropenem     #Sepsis   #Persistent leukocytosis   #Pancreatitis   #Cardiogenic shock     Recommendations:      PT TO BE SEEN. PRELIM NOTE  PENDING RECS. PLEASE WAIT FOR FINAL RECS AFTER DISCUSSION WITH ATTENDING#     62M CAD s/p CABG, DM, HTN, presented as a trasnfer from [place] for STEMI, found to have pancreatitis not necrotizing and cholecystitis on CT AP, admitted to to SICU for respiratory failure due to ARDS. Moved to the CTU for ECMO for cardiogenic shock vs ARDS, ECMO decannulated on 12/8 with IABP placement and mitral clip and removed on 12/17, s/p trac on 12/16/22, now on trach collar.    Febrile, tachycardic, hypotensive   Leukocytosis   CXR on 12/26: Slight improvement in the pulmonary vascular congestion  Blood Cx remained negative   Most recent Blood Cx on 12/18: NGTD  Trach Cx on 12/19: NGTD  LIJ central line replaced by R IJ central line on 12/25  S/P meropenem, vanco and diflucan  Received 1 dose of Vanc and started on Meropenem     #Sepsis   #Persistent leukocytosis   #Pancreatitis   #Cardiogenic shock     Recommendations:      PT TO BE SEEN. PRELIM NOTE  PENDING RECS. PLEASE WAIT FOR FINAL RECS AFTER DISCUSSION WITH ATTENDING#     62M CAD s/p CABG, DM, HTN, presented as a trasnfer from [place] for STEMI, found to have pancreatitis not necrotizing and cholecystitis on CT AP, admitted to to SICU for respiratory failure due to ARDS. Moved to the CTU for ECMO for cardiogenic shock vs ARDS, ECMO decannulated on 12/8 with IABP placement and mitral clip and removed on 12/17, s/p trac on 12/16/22, now on trach collar.    Febrile, tachycardic, hypotensive   Leukocytosis   CXR on 12/26: Slight improvement in the pulmonary vascular congestion  Blood Cx remained negative   Most recent Blood Cx on 12/18: NGTD  Trach Cx on 12/19: NGTD  LIJ central line replaced by R IJ central line on 12/25  S/P meropenem, vanco and diflucan  Received 1 dose of Vanc and started on Meropenem     #fever, Persistent leukocytosis   #Pancreatitis - improved  #Cardiogenic shock - resolved    Recommendations:  -s/p 1 dose of Vanc   -Increase Meropenem to 500 mg q 12hrs   -check Vanc level in am   -Send MRSA PCR  -F/up blood Cx X2   -F/up urine Cx, sputum Cx   -If fever persists, will need a repeat CT C/A/P   -Monitor T curve and WBC trend     Discussed with Dr Javed Jones MD, PGY4  ID fellow  [employer] Teams Preferred  After 5pm/weekends call [phone]         62M CAD s/p CABG, DM, HTN, presented as a trasnfer from [place] for STEMI, found to have pancreatitis not necrotizing and cholecystitis on CT AP, admitted to to SICU for respiratory failure due to ARDS. Moved to the CTU for ECMO for cardiogenic shock vs ARDS, ECMO decannulated on 12/8 with IABP placement and mitral clip and removed on 12/17, s/p trac on 12/16/22, now on trach collar.    Febrile, tachycardic, hypotensive   Leukocytosis   CXR on 12/26: Slight improvement in the pulmonary vascular congestion  Blood Cx remained negative   Most recent Blood Cx on 12/18: NGTD  Trach Cx on 12/19: NGTD  LIJ central line replaced by R IJ central line on 12/25  S/P meropenem, vanco and diflucan  Received 1 dose of Vanc and started on Meropenem     #fever, Persistent leukocytosis   #Pancreatitis - improved  #Cardiogenic shock - resolved    Recommendations:  -s/p 1 dose of Vanc   -Increase Meropenem to 500 mg q 12hrs   -check Vanc level in am   -Send MRSA PCR  -F/up blood Cx X2   -F/up urine Cx, sputum Cx   -If fever persists, will need a repeat CT C/A/P   -Monitor T curve and WBC trend     Discussed with Dr Javed Jones MD, PGY4  ID fellow  [employer] Teams Preferred  After 5pm/weekends call [phone]         62M CAD s/p CABG, DM, HTN, presented as a trasnfer from [place] for STEMI, found to have pancreatitis not necrotizing and cholecystitis on CT AP, admitted to to SICU for respiratory failure due to ARDS. Moved to the CTU for ECMO for cardiogenic shock vs ARDS, ECMO decannulated on 12/8 with IABP placement and mitral clip and removed on 12/17, s/p trac on 12/16/22, now on trach collar.    Febrile, tachycardic, hypotensive   Leukocytosis   CXR on 12/26: Slight improvement in the pulmonary vascular congestion  Blood Cx remained negative   Most recent Blood Cx on 12/18: NGTD  Trach Cx on 12/19: NGTD  LIJ central line replaced by R IJ central line on 12/25  S/P meropenem, vanco and diflucan  Received 1 dose of Vanc and started on Meropenem     #fever, Persistent leukocytosis   #Pancreatitis - improved  #Cardiogenic shock - resolved    Recommendations:  -s/p 1 dose of Vanc   -Increase Meropenem to 500 mg q 12hrs   -check Vanc level in am   -Send MRSA PCR  -F/up blood Cx X2   -F/up urine Cx, sputum Cx   -If fever persists, will need a repeat CT C/A/P   -Monitor T curve and WBC trend     Discussed with Dr Javed Jones MD, PGY4  ID fellow  [employer] Teams Preferred  After 5pm/weekends call [phone]

## 2022-12-26 NOTE — PROGRESS NOTE ADULT - SUBJECTIVE AND OBJECTIVE BOX
[facility] Division of Kidney Diseases & Hypertension  FOLLOW UP NOTE  [phone]--------------------------------------------------------------------------------  Chief Complaint:Acute pancreatitis without infection or necrosis    HPI:  63 y/o male with PMH of CABG, DM, HTN, HLD presenting as transfer from [place] for c/f STEMI. Course c/b gallstone pancreatitis leading to ARDS and shock & cardiac arrest now on [facility] ECMO. Had significant troponin elevation and his LVEF down to 8%. Pt was deemed to be too unstable to have an angiogram and potential PCI due to his co-morbidities & a new subdural bleed. Pt being seen for ERIC. SCr on arrival was 2.15; trended down to hector 1.6 on 11/25 and eventually increased to 3.95 11/28. Pt received IV diuretics as per CTU. Pt initiated on CRRT on 12/5/22 to optimize volume status and electrolytes.  Pt underwent decannulation of ECMO on 12/8/22. Pt was restarted on 12/9/22 for volume overload. Pt underwent mitral clip OR (12/16/22).  s/p trach on 12/16     HPI: Pt. seen this AM. Febrile overnight. HD cath removed. Needs new HD cath today and will plan for HD session for volume overload         PAST HISTORY  --------------------------------------------------------------------------------  No significant changes to PMH, PSH, FHx, SHx, unless otherwise noted    ALLERGIES & MEDICATIONS  --------------------------------------------------------------------------------  Allergies    No Known Allergies    Intolerances      Standing Inpatient Medications  aMIOdarone    Tablet 200 milliGRAM(s) Oral daily  aspirin  chewable 81 milliGRAM(s) Oral daily  atorvastatin 80 milliGRAM(s) Oral at bedtime  chlorhexidine 0.12% Liquid 15 milliLiter(s) Oral Mucosa every 12 hours  chlorhexidine 2% Cloths 1 Application(s) Topical <User Schedule>  dextrose 5%. 1000 milliLiter(s) IV Continuous <Continuous>  heparin   Injectable 5000 Unit(s) SubCutaneous every 8 hours  insulin lispro (ADMELOG) corrective regimen sliding scale   SubCutaneous every 6 hours  meropenem  IVPB 500 milliGRAM(s) IV Intermittent every 24 hours  metoclopramide 10 milliGRAM(s) Oral every 8 hours  multivitamin/minerals/iron Oral Solution (CENTRUM) 15 milliLiter(s) Oral daily  pantoprazole  Injectable 40 milliGRAM(s) IV Push daily  senna 2 Tablet(s) Oral at bedtime  thiamine 100 milliGRAM(s) Enteral Tube daily    PRN Inpatient Medications  acetaminophen    Suspension .. 650 milliGRAM(s) Oral every 6 hours PRN  sodium chloride 0.9% lock flush 10 milliLiter(s) IV Push every 1 hour PRN      REVIEW OF SYSTEMS  --------------------------------------------------------------------------------  Unable to be obtained       All other systems were reviewed and are negative, except as noted.    VITALS/PHYSICAL EXAM  --------------------------------------------------------------------------------  T(C): 38.4 (12-26-22 @ 04:00), Max: 38.5 (12-26-22 @ 03:00)  HR: 112 (12-26-22 @ 09:32) (97 - 130)  BP: 114/66 (12-25-22 @ 19:00) (92/52 - 117/65)  RR: 25 (12-26-22 @ 09:31) (18 - 32)  SpO2: 100% (12-26-22 @ 09:32) (95% - 100%)  Wt(kg): --        12-25-22 @ 07:01  -  12-26-22 @ 07:00  --------------------------------------------------------  IN: 1815 mL / OUT: 1825 mL / NET: -10 mL    12-26-22 @ 07:01  -  12-26-22 @ 10:06  --------------------------------------------------------  IN: 255 mL / OUT: 150 mL / NET: 105 mL      Physical Exam:  	Gen: ill appearing male  	HEENT: Anicteric  	Pulm:  trach+  	CV: S1S2+  	Abd: Soft, +BS       	Ext: + LE edema B/L  	Neuro: Awake  	Skin: Warm and dry             Acces: No access    LABS/STUDIES  --------------------------------------------------------------------------------              8.5    15.01 >-----------<  149      [12-26-22 @ 00:25]              27.3     136  |  94  |  93  ----------------------------<  226      [12-26-22 @ 00:25]  5.0   |  24  |  5.35        Ca     8.4     [12-26-22 @ 00:25]      Mg     2.9     [12-26-22 @ 00:25]      Phos  6.6     [12-26-22 @ 00:25]    TPro  7.1  /  Alb  3.4  /  TBili  0.7  /  DBili  x   /  AST  24  /  ALT  23  /  AlkPhos  94  [12-26-22 @ 00:25]    PT/INR: PT 11.9 , INR 1.03       [12-25-22 @ 00:28]  PTT: 27.7       [12-25-22 @ 00:28]      Creatinine Trend:  SCr 5.35 [12-26 @ 00:25]  SCr 5.19 [12-25 @ 15:24]  SCr 4.59 [12-25 @ 00:28]  SCr 3.22 [12-24 @ 00:43]  SCr 4.21 [12-23 @ 01:22]    Urinalysis - [12-26-22 @ 02:49]      Color Light Yellow / Appearance Slightly Turbid / SG 1.009 / pH 6.5      Gluc Negative / Ketone Negative  / Bili Negative / Urobili Negative       Blood Moderate / Protein 30 mg/dL / Leuk Est Large / Nitrite Negative       /  / Hyaline 0 / Gran  / Sq Epi  / Non Sq Epi 0 / Bacteria Few        HBsAb 889.7      [12-20-22 @ 11:25]  HBcAb Reactive      [12-20-22 @ 11:25]  HCV 0.15, Nonreact      [12-20-22 @ 11:25]     [facility] Division of Kidney Diseases & Hypertension  FOLLOW UP NOTE  [phone]--------------------------------------------------------------------------------  Chief Complaint:Acute pancreatitis without infection or necrosis    HPI:  63 y/o male with PMH of CABG, DM, HTN, HLD presenting as transfer from [place] for c/f STEMI. Course c/b gallstone pancreatitis leading to ARDS and shock & cardiac arrest now on [facility] ECMO. Had significant troponin elevation and his LVEF down to 8%. Pt was deemed to be too unstable to have an angiogram and potential PCI due to his co-morbidities & a new subdural bleed. Pt being seen for ERIC. SCr on arrival was 2.15; trended down to hector 1.6 on 11/25 and eventually increased to 3.95 11/28. Pt received IV diuretics as per CTU. Pt initiated on CRRT on 12/5/22 to optimize volume status and electrolytes.  Pt underwent decannulation of ECMO on 12/8/22. Pt was restarted on 12/9/22 for volume overload. Pt underwent mitral clip OR (12/16/22).  s/p trach on 12/16     HPI: Pt. seen this AM. Febrile overnight. HD cath removed. Needs new HD cath today and will plan for HD session for volume overload         PAST HISTORY  --------------------------------------------------------------------------------  No significant changes to PMH, PSH, FHx, SHx, unless otherwise noted    ALLERGIES & MEDICATIONS  --------------------------------------------------------------------------------  Allergies    No Known Allergies    Intolerances      Standing Inpatient Medications  aMIOdarone    Tablet 200 milliGRAM(s) Oral daily  aspirin  chewable 81 milliGRAM(s) Oral daily  atorvastatin 80 milliGRAM(s) Oral at bedtime  chlorhexidine 0.12% Liquid 15 milliLiter(s) Oral Mucosa every 12 hours  chlorhexidine 2% Cloths 1 Application(s) Topical <User Schedule>  dextrose 5%. 1000 milliLiter(s) IV Continuous <Continuous>  heparin   Injectable 5000 Unit(s) SubCutaneous every 8 hours  insulin lispro (ADMELOG) corrective regimen sliding scale   SubCutaneous every 6 hours  meropenem  IVPB 500 milliGRAM(s) IV Intermittent every 24 hours  metoclopramide 10 milliGRAM(s) Oral every 8 hours  multivitamin/minerals/iron Oral Solution (CENTRUM) 15 milliLiter(s) Oral daily  pantoprazole  Injectable 40 milliGRAM(s) IV Push daily  senna 2 Tablet(s) Oral at bedtime  thiamine 100 milliGRAM(s) Enteral Tube daily    PRN Inpatient Medications  acetaminophen    Suspension .. 650 milliGRAM(s) Oral every 6 hours PRN  sodium chloride 0.9% lock flush 10 milliLiter(s) IV Push every 1 hour PRN      REVIEW OF SYSTEMS  --------------------------------------------------------------------------------  Unable to be obtained       All other systems were reviewed and are negative, except as noted.    VITALS/PHYSICAL EXAM  --------------------------------------------------------------------------------  T(C): 38.4 (12-26-22 @ 04:00), Max: 38.5 (12-26-22 @ 03:00)  HR: 112 (12-26-22 @ 09:32) (97 - 130)  BP: 114/66 (12-25-22 @ 19:00) (92/52 - 117/65)  RR: 25 (12-26-22 @ 09:31) (18 - 32)  SpO2: 100% (12-26-22 @ 09:32) (95% - 100%)  Wt(kg): --        12-25-22 @ 07:01  -  12-26-22 @ 07:00  --------------------------------------------------------  IN: 1815 mL / OUT: 1825 mL / NET: -10 mL    12-26-22 @ 07:01  -  12-26-22 @ 10:06  --------------------------------------------------------  IN: 255 mL / OUT: 150 mL / NET: 105 mL      Physical Exam:  	Gen: ill appearing male  	HEENT: Anicteric  	Pulm:  trach+  	CV: S1S2+  	Abd: Soft, +BS       	Ext: + LE edema B/L  	Neuro: Awake  	Skin: Warm and dry             Acces: No access    LABS/STUDIES  --------------------------------------------------------------------------------              8.5    15.01 >-----------<  149      [12-26-22 @ 00:25]              27.3     136  |  94  |  93  ----------------------------<  226      [12-26-22 @ 00:25]  5.0   |  24  |  5.35        Ca     8.4     [12-26-22 @ 00:25]      Mg     2.9     [12-26-22 @ 00:25]      Phos  6.6     [12-26-22 @ 00:25]    TPro  7.1  /  Alb  3.4  /  TBili  0.7  /  DBili  x   /  AST  24  /  ALT  23  /  AlkPhos  94  [12-26-22 @ 00:25]    PT/INR: PT 11.9 , INR 1.03       [12-25-22 @ 00:28]  PTT: 27.7       [12-25-22 @ 00:28]      Creatinine Trend:  SCr 5.35 [12-26 @ 00:25]  SCr 5.19 [12-25 @ 15:24]  SCr 4.59 [12-25 @ 00:28]  SCr 3.22 [12-24 @ 00:43]  SCr 4.21 [12-23 @ 01:22]    Urinalysis - [12-26-22 @ 02:49]      Color Light Yellow / Appearance Slightly Turbid / SG 1.009 / pH 6.5      Gluc Negative / Ketone Negative  / Bili Negative / Urobili Negative       Blood Moderate / Protein 30 mg/dL / Leuk Est Large / Nitrite Negative       /  / Hyaline 0 / Gran  / Sq Epi  / Non Sq Epi 0 / Bacteria Few        HBsAb 889.7      [12-20-22 @ 11:25]  HBcAb Reactive      [12-20-22 @ 11:25]  HCV 0.15, Nonreact      [12-20-22 @ 11:25]     [facility] Division of Kidney Diseases & Hypertension  FOLLOW UP NOTE  [phone]--------------------------------------------------------------------------------  Chief Complaint:Acute pancreatitis without infection or necrosis    HPI:  61 y/o male with PMH of CABG, DM, HTN, HLD presenting as transfer from [place] for c/f STEMI. Course c/b gallstone pancreatitis leading to ARDS and shock & cardiac arrest now on [facility] ECMO. Had significant troponin elevation and his LVEF down to 8%. Pt was deemed to be too unstable to have an angiogram and potential PCI due to his co-morbidities & a new subdural bleed. Pt being seen for ERIC. SCr on arrival was 2.15; trended down to hector 1.6 on 11/25 and eventually increased to 3.95 11/28. Pt received IV diuretics as per CTU. Pt initiated on CRRT on 12/5/22 to optimize volume status and electrolytes.  Pt underwent decannulation of ECMO on 12/8/22. Pt was restarted on 12/9/22 for volume overload. Pt underwent mitral clip OR (12/16/22).  s/p trach on 12/16     HPI: Pt. seen this AM. Febrile overnight. HD cath removed. Needs new HD cath today and will plan for HD session for volume overload         PAST HISTORY  --------------------------------------------------------------------------------  No significant changes to PMH, PSH, FHx, SHx, unless otherwise noted    ALLERGIES & MEDICATIONS  --------------------------------------------------------------------------------  Allergies    No Known Allergies    Intolerances      Standing Inpatient Medications  aMIOdarone    Tablet 200 milliGRAM(s) Oral daily  aspirin  chewable 81 milliGRAM(s) Oral daily  atorvastatin 80 milliGRAM(s) Oral at bedtime  chlorhexidine 0.12% Liquid 15 milliLiter(s) Oral Mucosa every 12 hours  chlorhexidine 2% Cloths 1 Application(s) Topical <User Schedule>  dextrose 5%. 1000 milliLiter(s) IV Continuous <Continuous>  heparin   Injectable 5000 Unit(s) SubCutaneous every 8 hours  insulin lispro (ADMELOG) corrective regimen sliding scale   SubCutaneous every 6 hours  meropenem  IVPB 500 milliGRAM(s) IV Intermittent every 24 hours  metoclopramide 10 milliGRAM(s) Oral every 8 hours  multivitamin/minerals/iron Oral Solution (CENTRUM) 15 milliLiter(s) Oral daily  pantoprazole  Injectable 40 milliGRAM(s) IV Push daily  senna 2 Tablet(s) Oral at bedtime  thiamine 100 milliGRAM(s) Enteral Tube daily    PRN Inpatient Medications  acetaminophen    Suspension .. 650 milliGRAM(s) Oral every 6 hours PRN  sodium chloride 0.9% lock flush 10 milliLiter(s) IV Push every 1 hour PRN      REVIEW OF SYSTEMS  --------------------------------------------------------------------------------  Unable to be obtained       All other systems were reviewed and are negative, except as noted.    VITALS/PHYSICAL EXAM  --------------------------------------------------------------------------------  T(C): 38.4 (12-26-22 @ 04:00), Max: 38.5 (12-26-22 @ 03:00)  HR: 112 (12-26-22 @ 09:32) (97 - 130)  BP: 114/66 (12-25-22 @ 19:00) (92/52 - 117/65)  RR: 25 (12-26-22 @ 09:31) (18 - 32)  SpO2: 100% (12-26-22 @ 09:32) (95% - 100%)  Wt(kg): --        12-25-22 @ 07:01  -  12-26-22 @ 07:00  --------------------------------------------------------  IN: 1815 mL / OUT: 1825 mL / NET: -10 mL    12-26-22 @ 07:01  -  12-26-22 @ 10:06  --------------------------------------------------------  IN: 255 mL / OUT: 150 mL / NET: 105 mL      Physical Exam:  	Gen: ill appearing male  	HEENT: Anicteric  	Pulm:  trach+  	CV: S1S2+  	Abd: Soft, +BS       	Ext: + LE edema B/L  	Neuro: Awake  	Skin: Warm and dry             Acces: No access    LABS/STUDIES  --------------------------------------------------------------------------------              8.5    15.01 >-----------<  149      [12-26-22 @ 00:25]              27.3     136  |  94  |  93  ----------------------------<  226      [12-26-22 @ 00:25]  5.0   |  24  |  5.35        Ca     8.4     [12-26-22 @ 00:25]      Mg     2.9     [12-26-22 @ 00:25]      Phos  6.6     [12-26-22 @ 00:25]    TPro  7.1  /  Alb  3.4  /  TBili  0.7  /  DBili  x   /  AST  24  /  ALT  23  /  AlkPhos  94  [12-26-22 @ 00:25]    PT/INR: PT 11.9 , INR 1.03       [12-25-22 @ 00:28]  PTT: 27.7       [12-25-22 @ 00:28]      Creatinine Trend:  SCr 5.35 [12-26 @ 00:25]  SCr 5.19 [12-25 @ 15:24]  SCr 4.59 [12-25 @ 00:28]  SCr 3.22 [12-24 @ 00:43]  SCr 4.21 [12-23 @ 01:22]    Urinalysis - [12-26-22 @ 02:49]      Color Light Yellow / Appearance Slightly Turbid / SG 1.009 / pH 6.5      Gluc Negative / Ketone Negative  / Bili Negative / Urobili Negative       Blood Moderate / Protein 30 mg/dL / Leuk Est Large / Nitrite Negative       /  / Hyaline 0 / Gran  / Sq Epi  / Non Sq Epi 0 / Bacteria Few        HBsAb 889.7      [12-20-22 @ 11:25]  HBcAb Reactive      [12-20-22 @ 11:25]  HCV 0.15, Nonreact      [12-20-22 @ 11:25]

## 2022-12-26 NOTE — PROGRESS NOTE ADULT - SUBJECTIVE AND OBJECTIVE BOX
CRITICAL CARE ATTENDING - CTICU    MEDICATIONS  (STANDING):  aMIOdarone    Tablet 200 milliGRAM(s) Oral daily  aspirin  chewable 81 milliGRAM(s) Oral daily  atorvastatin 80 milliGRAM(s) Oral at bedtime  chlorhexidine 0.12% Liquid 15 milliLiter(s) Oral Mucosa every 12 hours  chlorhexidine 2% Cloths 1 Application(s) Topical <User Schedule>  chlorhexidine 4% Liquid 1 Application(s) Topical <User Schedule>  heparin   Injectable 5000 Unit(s) SubCutaneous every 8 hours  insulin lispro (ADMELOG) corrective regimen sliding scale   SubCutaneous every 6 hours  metoclopramide 10 milliGRAM(s) Oral every 8 hours  multivitamin/minerals/iron Oral Solution (CENTRUM) 15 milliLiter(s) Oral daily  pantoprazole  Injectable 40 milliGRAM(s) IV Push daily  senna 2 Tablet(s) Oral at bedtime  thiamine 100 milliGRAM(s) Enteral Tube daily  vancomycin  IVPB 1000 milliGRAM(s) IV Intermittent once                          8.5    15.01 )-----------( 149      ( 26 Dec 2022 00:25 )             27.3       12    136  |  94<L>  |  93<H>  ----------------------------<  226<H>  5.0   |  24  |  5.35<H>    Ca    8.4      26 Dec 2022 00:25  Phos  6.6     12  Mg     2.9     12    TPro  7.1  /  Alb  3.4  /  TBili  0.7  /  DBili  x   /  AST  24  /  ALT  23  /  AlkPhos  94  12      PT/INR - ( 25 Dec 2022 00:28 )   PT: 11.9 sec;   INR: 1.03 ratio         PTT - ( 25 Dec 2022 00:28 )  PTT:27.7 sec    Mode: CPAP with PS  FiO2: 50  PEEP: 5  PS: 10  ITime: 1  MAP: 10  PIP: 17      Daily     Daily Weight in k.2 (26 Dec 2022 00:00)       @ 07:01  -   @ 07:00  --------------------------------------------------------  IN: 1700 mL / OUT: 1775 mL / NET: -75 mL        Critically Ill patient  : [ ] preoperative ,   [ ] post operative  [x] Non Operative    Requires :  [x ] Arterial Line   [ x] Central Line  [ ] PA catheter  [ ] IABP [ ] ECMO  [ ] LVAD  [x ] Ventilator  [ ] pacemaker [x] Trach[x] CVVHD                      [x ] ABG's     [x ] Pulse Oxymetry Monitoring  Bedside evaluation , monitoring , treatment of hemodynamics , fluids , IVP/ IVCD meds.      Diagnosis:      - [facility] - arrest in [facility]     ECMO Decannulation      POD - IABP placed in cath lab / IABP dc'ed     POD - Mitral Clip x1, TRACH 7 Merari XLT prox with ENT    MI     Acute Pancreatitis     ARDS     Hemodynamic lability,  instability. Requires IVCD [x ] vasopressors [x] inotropes  [ ] vasodilator  [x]IVSS fluid  to maintain MAP, perfusion, C.I.     Post Arrest Shock state    Respiratory Failure - Tracheostomy     Requires chest PT, pulmonary toilet, ambu bagging, suctioning to maintain SaO2,  patent airway and treat atelectasis.     Ventilator Management:  [x ]AC-rest    [ x]CPAP-PS Wean    [x]Trach Collar     [ ]Extubate    [ ] T-Piece  [ ]peep>5     Difficult weaning process - multiple organ system involvement in critically ill patient     CHF- acute [ x]   chronic [  ]    systolic [ x]   diatolic [ ]          - Echo- EF -   30-40% Severe segmental LV systolic dysfunction          [ ] RV dysfunction     - Cxr-cardiomegally, edema          - Clinical-  [ ]inotropes   [ ]pressors  [ ]diuresis   [ ]IABP      [ ]LVAD   [x]Respiratory Failure.     Cardiogenic Shock - resolving    CVVHD  -50cc/hr    Thrombocytopenia     Hypotension     DM- ISS    Requires bedside physical therapy, mobilization and [facility].     Tolerates NG / NJ feeds at [x] goal rate  [ ] trophic rate    [ ]       rate                 By signing my name below, I, Rosio Alonzo, attest that this documentation has been prepared under the direction and in the presence of Finn Zelaya MD.   Electronically Signed: Brian Burnett 22 @ 07:32      Discussed with CT surgeon, Physician Assistant - Nurse Practitioner- Critical care medicine team.   Discussed at  AM / PM rounds.   Chart, labs , films reviewed.    Cumulative Critical Care Time Given Today:  CRITICAL CARE ATTENDING - CTICU    MEDICATIONS  (STANDING):  aMIOdarone    Tablet 200 milliGRAM(s) Oral daily  aspirin  chewable 81 milliGRAM(s) Oral daily  atorvastatin 80 milliGRAM(s) Oral at bedtime  chlorhexidine 0.12% Liquid 15 milliLiter(s) Oral Mucosa every 12 hours  chlorhexidine 2% Cloths 1 Application(s) Topical <User Schedule>  chlorhexidine 4% Liquid 1 Application(s) Topical <User Schedule>  heparin   Injectable 5000 Unit(s) SubCutaneous every 8 hours  insulin lispro (ADMELOG) corrective regimen sliding scale   SubCutaneous every 6 hours  metoclopramide 10 milliGRAM(s) Oral every 8 hours  multivitamin/minerals/iron Oral Solution (CENTRUM) 15 milliLiter(s) Oral daily  pantoprazole  Injectable 40 milliGRAM(s) IV Push daily  senna 2 Tablet(s) Oral at bedtime  thiamine 100 milliGRAM(s) Enteral Tube daily  vancomycin  IVPB 1000 milliGRAM(s) IV Intermittent once                          8.5    15.01 )-----------( 149      ( 26 Dec 2022 00:25 )             27.3       12    136  |  94<L>  |  93<H>  ----------------------------<  226<H>  5.0   |  24  |  5.35<H>    Ca    8.4      26 Dec 2022 00:25  Phos  6.6     12  Mg     2.9     12    TPro  7.1  /  Alb  3.4  /  TBili  0.7  /  DBili  x   /  AST  24  /  ALT  23  /  AlkPhos  94  12      PT/INR - ( 25 Dec 2022 00:28 )   PT: 11.9 sec;   INR: 1.03 ratio         PTT - ( 25 Dec 2022 00:28 )  PTT:27.7 sec    Mode: CPAP with PS  FiO2: 50  PEEP: 5  PS: 10  ITime: 1  MAP: 10  PIP: 17      Daily     Daily Weight in k.2 (26 Dec 2022 00:00)       @ 07:01  -   @ 07:00  --------------------------------------------------------  IN: 1700 mL / OUT: 1775 mL / NET: -75 mL        Critically Ill patient  : [ ] preoperative ,   [ ] post operative  [x] Non Operative    Requires :  [x ] Arterial Line   [ x] Central Line  [ ] PA catheter  [ ] IABP [ ] ECMO  [ ] LVAD  [x ] Ventilator  [ ] pacemaker [x] Trach[x] CVVHD                      [x ] ABG's     [x ] Pulse Oxymetry Monitoring  Bedside evaluation , monitoring , treatment of hemodynamics , fluids , IVP/ IVCD meds.      Diagnosis:      - [facility] - arrest in [facility]     ECMO Decannulation      POD - IABP placed in cath lab / IABP dc'ed     POD - Mitral Clip x1, TRACH 7 Merari XLT prox with ENT    MI     Acute Pancreatitis     ARDS     Hemodynamic lability,  instability. Requires IVCD [x ] vasopressors [x] inotropes  [ ] vasodilator  [x]IVSS fluid  to maintain MAP, perfusion, C.I.     Post Arrest Shock state    Respiratory Failure - Tracheostomy     Requires chest PT, pulmonary toilet, ambu bagging, suctioning to maintain SaO2,  patent airway and treat atelectasis.     Ventilator Management:  [x ]AC-rest    [ x]CPAP-PS Wean    [x]Trach Collar     [ ]Extubate    [ ] T-Piece  [ ]peep>5     Difficult weaning process - multiple organ system involvement in critically ill patient     CHF- acute [ x]   chronic [  ]    systolic [ x]   diatolic [ ]          - Echo- EF -   30-40% Severe segmental LV systolic dysfunction          [ ] RV dysfunction     - Cxr-cardiomegally, edema          - Clinical-  [ ]inotropes   [ ]pressors  [ ]diuresis   [ ]IABP      [ ]LVAD   [x]Respiratory Failure.     Cardiogenic Shock - resolving    CVVHD  -50cc/hr    Thrombocytopenia     Hypotension     DM- ISS    Requires bedside physical therapy, mobilization and total [facility] care.     Tolerates NG / NJ feeds at [x] goal rate  [ ] trophic rate    [ ]       rate                 By signing my name below, I, Rosio Alonzo, attest that this documentation has been prepared under the direction and in the presence of Finn Zelaya MD.   Electronically Signed: Brian Burnett 22 @ 07:32      Discussed with CT surgeon, Physician Assistant - Nurse Practitioner- Critical care medicine team.   Discussed at  AM / PM rounds.   Chart, labs , films reviewed.    Cumulative Critical Care Time Given Today:  CRITICAL CARE ATTENDING - CTICU    MEDICATIONS  (STANDING):  aMIOdarone    Tablet 200 milliGRAM(s) Oral daily  aspirin  chewable 81 milliGRAM(s) Oral daily  atorvastatin 80 milliGRAM(s) Oral at bedtime  chlorhexidine 0.12% Liquid 15 milliLiter(s) Oral Mucosa every 12 hours  chlorhexidine 2% Cloths 1 Application(s) Topical <User Schedule>  chlorhexidine 4% Liquid 1 Application(s) Topical <User Schedule>  heparin   Injectable 5000 Unit(s) SubCutaneous every 8 hours  insulin lispro (ADMELOG) corrective regimen sliding scale   SubCutaneous every 6 hours  metoclopramide 10 milliGRAM(s) Oral every 8 hours  multivitamin/minerals/iron Oral Solution (CENTRUM) 15 milliLiter(s) Oral daily  pantoprazole  Injectable 40 milliGRAM(s) IV Push daily  senna 2 Tablet(s) Oral at bedtime  thiamine 100 milliGRAM(s) Enteral Tube daily  vancomycin  IVPB 1000 milliGRAM(s) IV Intermittent once                          8.5    15.01 )-----------( 149      ( 26 Dec 2022 00:25 )             27.3       12    136  |  94<L>  |  93<H>  ----------------------------<  226<H>  5.0   |  24  |  5.35<H>    Ca    8.4      26 Dec 2022 00:25  Phos  6.6     12  Mg     2.9     12    TPro  7.1  /  Alb  3.4  /  TBili  0.7  /  DBili  x   /  AST  24  /  ALT  23  /  AlkPhos  94  12      PT/INR - ( 25 Dec 2022 00:28 )   PT: 11.9 sec;   INR: 1.03 ratio         PTT - ( 25 Dec 2022 00:28 )  PTT:27.7 sec    Mode: CPAP with PS  FiO2: 50  PEEP: 5  PS: 10  ITime: 1  MAP: 10  PIP: 17      Daily     Daily Weight in k.2 (26 Dec 2022 00:00)       @ 07:01  -   @ 07:00  --------------------------------------------------------  IN: 1700 mL / OUT: 1775 mL / NET: -75 mL        Critically Ill patient  : [ ] preoperative ,   [ ] post operative  [x] Non Operative    Requires :  [x ] Arterial Line   [ x] Central Line  [ ] PA catheter  [ ] IABP [ ] ECMO  [ ] LVAD  [x ] Ventilator  [ ] pacemaker [x] Trach[x] CVVHD                      [x ] ABG's     [x ] Pulse Oxymetry Monitoring  Bedside evaluation , monitoring , treatment of hemodynamics , fluids , IVP/ IVCD meds.      Diagnosis:      - [facility] - arrest in [facility]ICU     ECMO Decannulation      POD - IABP placed in cath lab / IABP dc'ed     POD - Mitral Clip x1, TRACH 7 Merari XLT prox with ENT    MI     Acute Pancreatitis     ARDS     Hemodynamic lability,  instability. Requires IVCD [x ] vasopressors [x] inotropes  [ ] vasodilator  [x]IVSS fluid  to maintain MAP, perfusion, C.I.     Post Arrest Shock state    Respiratory Failure - Tracheostomy     Requires chest PT, pulmonary toilet, ambu bagging, suctioning to maintain SaO2,  patent airway and treat atelectasis.     Ventilator Management:  [x ]AC-rest    [ x]CPAP-PS Wean    [x]Trach Collar     [ ]Extubate    [ ] T-Piece  [ ]peep>5     Difficult weaning process - multiple organ system involvement in critically ill patient     CHF- acute [ x]   chronic [  ]    systolic [ x]   diatolic [ ]          - Echo- EF -   30-40% Severe segmental LV systolic dysfunction          [ ] RV dysfunction     - Cxr-cardiomegally, edema          - Clinical-  [ ]inotropes   [ ]pressors  [ ]diuresis   [ ]IABP      [ ]LVAD   [x]Respiratory Failure.     Cardiogenic Shock - resolving    CVVHD  -50cc/hr    Thrombocytopenia     Hypotension     DM- ISS    Requires bedside physical therapy, mobilization and total [facility] care.     Tolerates NG / NJ feeds at [x] goal rate  [ ] trophic rate    [ ]       rate                 By signing my name below, I, Rosio Alonzo, attest that this documentation has been prepared under the direction and in the presence of Finn Zelaya MD.   Electronically Signed: Brian Burnett 22 @ 07:32      Discussed with CT surgeon, Physician Assistant - Nurse Practitioner- Critical care medicine team.   Discussed at  AM / PM rounds.   Chart, labs , films reviewed.    Cumulative Critical Care Time Given Today:  CRITICAL CARE ATTENDING - CTICU    MEDICATIONS  (STANDING):  aMIOdarone    Tablet 200 milliGRAM(s) Oral daily  aspirin  chewable 81 milliGRAM(s) Oral daily  atorvastatin 80 milliGRAM(s) Oral at bedtime  chlorhexidine 0.12% Liquid 15 milliLiter(s) Oral Mucosa every 12 hours  chlorhexidine 2% Cloths 1 Application(s) Topical <User Schedule>  chlorhexidine 4% Liquid 1 Application(s) Topical <User Schedule>  heparin   Injectable 5000 Unit(s) SubCutaneous every 8 hours  insulin lispro (ADMELOG) corrective regimen sliding scale   SubCutaneous every 6 hours  metoclopramide 10 milliGRAM(s) Oral every 8 hours  multivitamin/minerals/iron Oral Solution (CENTRUM) 15 milliLiter(s) Oral daily  pantoprazole  Injectable 40 milliGRAM(s) IV Push daily  senna 2 Tablet(s) Oral at bedtime  thiamine 100 milliGRAM(s) Enteral Tube daily  vancomycin  IVPB 1000 milliGRAM(s) IV Intermittent once                          8.5    15.01 )-----------( 149      ( 26 Dec 2022 00:25 )             27.3       12    136  |  94<L>  |  93<H>  ----------------------------<  226<H>  5.0   |  24  |  5.35<H>    Ca    8.4      26 Dec 2022 00:25  Phos  6.6     12  Mg     2.9     12    TPro  7.1  /  Alb  3.4  /  TBili  0.7  /  DBili  x   /  AST  24  /  ALT  23  /  AlkPhos  94  12      PT/INR - ( 25 Dec 2022 00:28 )   PT: 11.9 sec;   INR: 1.03 ratio         PTT - ( 25 Dec 2022 00:28 )  PTT:27.7 sec    Mode: CPAP with PS  FiO2: 50  PEEP: 5  PS: 10  ITime: 1  MAP: 10  PIP: 17      Daily     Daily Weight in k.2 (26 Dec 2022 00:00)       @ 07:01  -   @ 07:00  --------------------------------------------------------  IN: 1700 mL / OUT: 1775 mL / NET: -75 mL        Critically Ill patient  : [ ] preoperative ,   [ ] post operative  [x] Non Operative    Requires :  [x ] Arterial Line   [ x] Central Line  [ ] PA catheter  [ ] IABP [ ] ECMO  [ ] LVAD  [x ] Ventilator  [ ] pacemaker [x] Trach  [x] HD                      [x ] ABG's     [x ] Pulse Oxymetry Monitoring  Bedside evaluation , monitoring , treatment of hemodynamics , fluids , IVP/ IVCD meds.      Diagnosis:      - [facility] - arrest in S[facility]     ECMO Decannulation      POD - IABP placed in cath lab / IABP dc'ed     POD - Mitral Clip x1, TRACH 7 Merari XLT prox with ENT    MI     Acute Pancreatitis     ARDS     Hemodynamic lability,  instability. Requires IVCD [ ] vasopressors [ ]  inotropes  [ ] vasodilator  [x]IVSS fluid  to maintain MAP, perfusion, C.I.     Post Arrest Shock state    Respiratory Failure - Tracheostomy     Requires chest PT, pulmonary toilet, ambu bagging, suctioning to maintain SaO2,  patent airway and treat atelectasis.     Ventilator Management:  [x ]AC-rest    [ x]CPAP-PS Wean    [x]Trach Collar     [ ]Extubate    [ ] T-Piece  [ ]peep>5     Difficult weaning process - multiple organ system involvement in critically ill patient     CHF- acute [ x]   chronic [  ]    systolic [ x]   diatolic [ ]          - Echo- EF -   30-40% Severe segmental LV systolic dysfunction          [ ] RV dysfunction     - Cxr-cardiomegally, edema          - Clinical-  [ ]inotropes   [ ]pressors  [ ]diuresis   [ ]IABP      [ ]LVAD   [x]Respiratory Failure.     Cardiogenic Shock - resolving    [ x] Hemodialysis today  [ ] Hemofiltration:    [x ] negative fluid balance [ ] even fluid balance [ ] positive fluid balance, in a hemodynamically unstable patient.     Thrombocytopenia     Hypotension     DM- ISS    Requires bedside physical therapy, mobilization and total [facility] care.     Tolerates NG / NJ feeds at [x] goal rate  [ ] trophic rate    [ ]       rate     fevers     UA c/w infection                 By signing my name below, I, Rosio Alonzo, attest that this documentation has been prepared under the direction and in the presence of Finn Zelaya MD.   Electronically Signed: Brian Burnett 22 @ 07:32    I, iFnn Zelaya, personally performed the services described in this documentation. All medical record entries made by the scribe were at my direction and in my presence. I have reviewed the chart and agree that the record reflects my personal performance and is accurate and complete.   Finn Zelaya MD.       Discussed with CT surgeon, Physician Assistant - Nurse Practitioner- Critical care medicine team.   Discussed at  AM / PM rounds.   Chart, labs , films reviewed.    Cumulative Critical Care Time Given Today:  30 min CRITICAL CARE ATTENDING - CTICU    MEDICATIONS  (STANDING):  aMIOdarone    Tablet 200 milliGRAM(s) Oral daily  aspirin  chewable 81 milliGRAM(s) Oral daily  atorvastatin 80 milliGRAM(s) Oral at bedtime  chlorhexidine 0.12% Liquid 15 milliLiter(s) Oral Mucosa every 12 hours  chlorhexidine 2% Cloths 1 Application(s) Topical <User Schedule>  chlorhexidine 4% Liquid 1 Application(s) Topical <User Schedule>  heparin   Injectable 5000 Unit(s) SubCutaneous every 8 hours  insulin lispro (ADMELOG) corrective regimen sliding scale   SubCutaneous every 6 hours  metoclopramide 10 milliGRAM(s) Oral every 8 hours  multivitamin/minerals/iron Oral Solution (CENTRUM) 15 milliLiter(s) Oral daily  pantoprazole  Injectable 40 milliGRAM(s) IV Push daily  senna 2 Tablet(s) Oral at bedtime  thiamine 100 milliGRAM(s) Enteral Tube daily  vancomycin  IVPB 1000 milliGRAM(s) IV Intermittent once                          8.5    15.01 )-----------( 149      ( 26 Dec 2022 00:25 )             27.3       12    136  |  94<L>  |  93<H>  ----------------------------<  226<H>  5.0   |  24  |  5.35<H>    Ca    8.4      26 Dec 2022 00:25  Phos  6.6     12  Mg     2.9     12    TPro  7.1  /  Alb  3.4  /  TBili  0.7  /  DBili  x   /  AST  24  /  ALT  23  /  AlkPhos  94  12      PT/INR - ( 25 Dec 2022 00:28 )   PT: 11.9 sec;   INR: 1.03 ratio         PTT - ( 25 Dec 2022 00:28 )  PTT:27.7 sec    Mode: CPAP with PS  FiO2: 50  PEEP: 5  PS: 10  ITime: 1  MAP: 10  PIP: 17      Daily     Daily Weight in k.2 (26 Dec 2022 00:00)       @ 07:01  -   @ 07:00  --------------------------------------------------------  IN: 1700 mL / OUT: 1775 mL / NET: -75 mL        Critically Ill patient  : [ ] preoperative ,   [ ] post operative  [x] Non Operative    Requires :  [x ] Arterial Line   [ x] Central Line  [ ] PA catheter  [ ] IABP [ ] ECMO  [ ] LVAD  [x ] Ventilator  [ ] pacemaker [x] Trach  [x] HD                      [x ] ABG's     [x ] Pulse Oxymetry Monitoring  Bedside evaluation , monitoring , treatment of hemodynamics , fluids , IVP/ IVCD meds.      Diagnosis:      - [facility] - arrest in SICU     ECMO Decannulation      POD - IABP placed in cath lab / IABP dc'ed     POD - Mitral Clip x1, TRACH 7 Merari XLT prox with ENT    MI     Acute Pancreatitis     ARDS     Hemodynamic lability,  instability. Requires IVCD [ ] vasopressors [ ]  inotropes  [ ] vasodilator  [x]IVSS fluid  to maintain MAP, perfusion, C.I.     Post Arrest Shock state    Respiratory Failure - Tracheostomy     Requires chest PT, pulmonary toilet, ambu bagging, suctioning to maintain SaO2,  patent airway and treat atelectasis.     Ventilator Management:  [x ]AC-rest    [ x]CPAP-PS Wean    [x]Trach Collar     [ ]Extubate    [ ] T-Piece  [ ]peep>5     Difficult weaning process - multiple organ system involvement in critically ill patient     CHF- acute [ x]   chronic [  ]    systolic [ x]   diatolic [ ]          - Echo- EF -   30-40% Severe segmental LV systolic dysfunction          [ ] RV dysfunction     - Cxr-cardiomegally, edema          - Clinical-  [ ]inotropes   [ ]pressors  [ ]diuresis   [ ]IABP      [ ]LVAD   [x]Respiratory Failure.     Cardiogenic Shock - resolving    [ x] Hemodialysis today  [ ] Hemofiltration:    [x ] negative fluid balance [ ] even fluid balance [ ] positive fluid balance, in a hemodynamically unstable patient.     Thrombocytopenia     Hypotension     DM- ISS    Requires bedside physical therapy, mobilization and total long-term care.     Tolerates NG / NJ feeds at [x] goal rate  [ ] trophic rate    [ ]       rate     fevers     UA c/w infection                 By signing my name below, I, Rosio Alonzo, attest that this documentation has been prepared under the direction and in the presence of Finn Zelaya MD.   Electronically Signed: Brian Burnett 22 @ 07:32    I, Finn Zelaya, personally performed the services described in this documentation. All medical record entries made by the scribe were at my direction and in my presence. I have reviewed the chart and agree that the record reflects my personal performance and is accurate and complete.   Finn Zelaya MD.       Discussed with CT surgeon, Physician Assistant - Nurse Practitioner- Critical care medicine team.   Discussed at  AM / PM rounds.   Chart, labs , films reviewed.    Cumulative Critical Care Time Given Today:  30 min CRITICAL CARE ATTENDING - CTICU    MEDICATIONS  (STANDING):  aMIOdarone    Tablet 200 milliGRAM(s) Oral daily  aspirin  chewable 81 milliGRAM(s) Oral daily  atorvastatin 80 milliGRAM(s) Oral at bedtime  chlorhexidine 0.12% Liquid 15 milliLiter(s) Oral Mucosa every 12 hours  chlorhexidine 2% Cloths 1 Application(s) Topical <User Schedule>  chlorhexidine 4% Liquid 1 Application(s) Topical <User Schedule>  heparin   Injectable 5000 Unit(s) SubCutaneous every 8 hours  insulin lispro (ADMELOG) corrective regimen sliding scale   SubCutaneous every 6 hours  metoclopramide 10 milliGRAM(s) Oral every 8 hours  multivitamin/minerals/iron Oral Solution (CENTRUM) 15 milliLiter(s) Oral daily  pantoprazole  Injectable 40 milliGRAM(s) IV Push daily  senna 2 Tablet(s) Oral at bedtime  thiamine 100 milliGRAM(s) Enteral Tube daily  vancomycin  IVPB 1000 milliGRAM(s) IV Intermittent once                          8.5    15.01 )-----------( 149      ( 26 Dec 2022 00:25 )             27.3       12    136  |  94<L>  |  93<H>  ----------------------------<  226<H>  5.0   |  24  |  5.35<H>    Ca    8.4      26 Dec 2022 00:25  Phos  6.6     12  Mg     2.9     12    TPro  7.1  /  Alb  3.4  /  TBili  0.7  /  DBili  x   /  AST  24  /  ALT  23  /  AlkPhos  94  12      PT/INR - ( 25 Dec 2022 00:28 )   PT: 11.9 sec;   INR: 1.03 ratio         PTT - ( 25 Dec 2022 00:28 )  PTT:27.7 sec    Mode: CPAP with PS  FiO2: 50  PEEP: 5  PS: 10  ITime: 1  MAP: 10  PIP: 17      Daily     Daily Weight in k.2 (26 Dec 2022 00:00)       @ 07:01  -   @ 07:00  --------------------------------------------------------  IN: 1700 mL / OUT: 1775 mL / NET: -75 mL        Critically Ill patient  : [ ] preoperative ,   [ ] post operative  [x] Non Operative    Requires :  [x ] Arterial Line   [ x] Central Line  [ ] PA catheter  [ ] IABP [ ] ECMO  [ ] LVAD  [x ] Ventilator  [ ] pacemaker [x] Trach  [x] HD                      [x ] ABG's     [x ] Pulse Oxymetry Monitoring  Bedside evaluation , monitoring , treatment of hemodynamics , fluids , IVP/ IVCD meds.      Diagnosis:      - [facility] ECM[facility] - arrest in SICU     ECMO Decannulation      POD - IABP placed in cath lab / IABP dc'ed     POD - Mitral Clip x1, TRACH 7 Merari XLT prox with ENT    MI     Acute Pancreatitis     ARDS     Hemodynamic lability,  instability. Requires IVCD [ ] vasopressors [ ]  inotropes  [ ] vasodilator  [x]IVSS fluid  to maintain MAP, perfusion, C.I.     Post Arrest Shock state    Respiratory Failure - Tracheostomy     Requires chest PT, pulmonary toilet, ambu bagging, suctioning to maintain SaO2,  patent airway and treat atelectasis.     Ventilator Management:  [x ]AC-rest    [ x]CPAP-PS Wean    [x]Trach Collar     [ ]Extubate    [ ] T-Piece  [ ]peep>5     Difficult weaning process - multiple organ system involvement in critically ill patient     CHF- acute [ x]   chronic [  ]    systolic [ x]   diatolic [ ]          - Echo- EF -   30-40% Severe segmental LV systolic dysfunction          [ ] RV dysfunction     - Cxr-cardiomegally, edema          - Clinical-  [ ]inotropes   [ ]pressors  [ ]diuresis   [ ]IABP      [ ]LVAD   [x]Respiratory Failure.     Cardiogenic Shock - resolving    [ x] Hemodialysis today  [ ] Hemofiltration:    [x ] negative fluid balance [ ] even fluid balance [ ] positive fluid balance, in a hemodynamically unstable patient.     Thrombocytopenia     Hypotension     DM- ISS    Requires bedside physical therapy, mobilization and total [facility] care.     Tolerates NG / NJ feeds at [x] goal rate  [ ] trophic rate    [ ]       rate     fevers     UA c/w infection                 By signing my name below, I, Rosio Alonzo, attest that this documentation has been prepared under the direction and in the presence of Finn Zelaya MD.   Electronically Signed: Brian Burnett 22 @ 07:32    I, Finn Zelaya, personally performed the services described in this documentation. All medical record entries made by the scribe were at my direction and in my presence. I have reviewed the chart and agree that the record reflects my personal performance and is accurate and complete.   Finn Zelaya MD.       Discussed with CT surgeon, Physician Assistant - Nurse Practitioner- Critical care medicine team.   Discussed at  AM / PM rounds.   Chart, labs , films reviewed.    Cumulative Critical Care Time Given Today:  30 min

## 2022-12-26 NOTE — PROGRESS NOTE ADULT - PROBLEM SELECTOR PLAN 2
Hgb below goal  Check iron studies    If you have any questions, please feel free to contact me  Abhi Leone  Nephrology Fellow  [phone]; Prefer [employer] TEAMS  (After 5pm or on weekends please page the on-call fellow).    Patient was discussed with Dr. Strauss  who agrees with my A/P. Addendum to follow Hgb below goal  Check iron studies    If you have any questions, please feel free to contact me  Abhi Leone  Nephrology Fellow  [phone]; Prefer [employer] TEAMS  (After 5pm or on weekends please page the on-call fellow).    Patient was discussed with Dr. Strauss  who agrees with my A/P. Addendum to follow Hgb below goal  Check iron studies    If you have any questions, please feel free to contact me  Abhi Leone  Nephrology Fellow  [phone]; Prefer [employer] TEAMS  (After 5pm or on weekends please page the on-call fellow).    Patient was discussed with Dr. Strauss  who agrees with my A/P. Addendum to follow

## 2022-12-26 NOTE — PROGRESS NOTE ADULT - SUBJECTIVE AND OBJECTIVE BOX
Other Count[place] Subjective:  No acute events.     Medications:  acetaminophen    Suspension .. 650 milliGRAM(s) Oral every 6 hours PRN  aMIOdarone    Tablet 200 milliGRAM(s) Oral daily  aspirin  chewable 81 milliGRAM(s) Oral daily  atorvastatin 80 milliGRAM(s) Oral at bedtime  chlorhexidine 0.12% Liquid 15 milliLiter(s) Oral Mucosa every 12 hours  chlorhexidine 2% Cloths 1 Application(s) Topical <User Schedule>  dextrose 5%. 1000 milliLiter(s) IV Continuous <Continuous>  heparin   Injectable 5000 Unit(s) SubCutaneous every 8 hours  insulin lispro (ADMELOG) corrective regimen sliding scale   SubCutaneous every 6 hours  meropenem  IVPB 500 milliGRAM(s) IV Intermittent every 24 hours  metoclopramide 10 milliGRAM(s) Oral every 8 hours  multivitamin/minerals/iron Oral Solution (CENTRUM) 15 milliLiter(s) Oral daily  pantoprazole  Injectable 40 milliGRAM(s) IV Push daily  senna 2 Tablet(s) Oral at bedtime  sodium chloride 0.9% lock flush 10 milliLiter(s) IV Push every 1 hour PRN  thiamine 100 milliGRAM(s) Enteral Tube daily  vancomycin  IVPB 1000 milliGRAM(s) IV Intermittent once    Vitals:  T(C): 38.4 (12-26-22 @ 04:00), Max: 38.5 (12-26-22 @ 03:00)  HR: 103 (12-26-22 @ 08:00) (97 - 130)  BP: 114/66 (12-25-22 @ 19:00) (92/52 - 117/65)  BP(mean): 85 (12-25-22 @ 19:00) (65 - 87)  ABP: 103/52 (12-26-22 @ 08:00) (95/37 - 145/47)  ABP(mean): 69 (12-26-22 @ 08:00) (53 - 78)  RR: 25 (12-26-22 @ 08:00) (18 - 34)  SpO2: 100% (12-26-22 @ 08:00) (94% - 100%)      I&O's Summary    25 Dec 2022 07:01  -  26 Dec 2022 07:00  --------------------------------------------------------  IN: 1815 mL / OUT: 1825 mL / NET: -10 mL    26 Dec 2022 07:01  -  26 Dec 2022 08:37  --------------------------------------------------------  IN: 65 mL / OUT: 0 mL / NET: 65 mL      Physical Exam:  Appearance: No acute distress   Eyes: no scleral icterus   HEENT: Normal oral mucosa. Trach collar in place   Cardiovascular: RRR, S1, S2, no murmurs, rubs, or gallops; no edema; no JVD  Respiratory: Clear to auscultation bilaterally, no auditory stridor   Gastrointestinal: soft, non-tender, non-distended   Musculoskeletal: No clubbing; no joint deformity   Neurologic: Moving all 4 extremities spontaneously, sensation grossly intact, generalized weakness  Skin: No rashes, or cyanosis  Mode: CPAP with PS, FiO2: 50, PEEP: 5, PS: 10, ITime: 1, MAP: 10, PIP: 17    Labs:                        8.5    15.01 )-----------( 149      ( 26 Dec 2022 00:25 )             27.3     12-26    136  |  94<L>  |  93<H>  ----------------------------<  226<H>  5.0   |  24  |  5.35<H>    Ca    8.4      26 Dec 2022 00:25  Phos  6.6     12-26  Mg     2.9     12-26    TPro  7.1  /  Alb  3.4  /  TBili  0.7  /  DBili  x   /  AST  24  /  ALT  23  /  AlkPhos  94  12-26       Subjective:  No acute events. Plan for HD today.     Medications:  acetaminophen    Suspension .. 650 milliGRAM(s) Oral every 6 hours PRN  aMIOdarone    Tablet 200 milliGRAM(s) Oral daily  aspirin  chewable 81 milliGRAM(s) Oral daily  atorvastatin 80 milliGRAM(s) Oral at bedtime  chlorhexidine 0.12% Liquid 15 milliLiter(s) Oral Mucosa every 12 hours  chlorhexidine 2% Cloths 1 Application(s) Topical <User Schedule>  dextrose 5%. 1000 milliLiter(s) IV Continuous <Continuous>  heparin   Injectable 5000 Unit(s) SubCutaneous every 8 hours  insulin lispro (ADMELOG) corrective regimen sliding scale   SubCutaneous every 6 hours  meropenem  IVPB 500 milliGRAM(s) IV Intermittent every 24 hours  metoclopramide 10 milliGRAM(s) Oral every 8 hours  multivitamin/minerals/iron Oral Solution (CENTRUM) 15 milliLiter(s) Oral daily  pantoprazole  Injectable 40 milliGRAM(s) IV Push daily  senna 2 Tablet(s) Oral at bedtime  sodium chloride 0.9% lock flush 10 milliLiter(s) IV Push every 1 hour PRN  thiamine 100 milliGRAM(s) Enteral Tube daily  vancomycin  IVPB 1000 milliGRAM(s) IV Intermittent once    Vitals:  T(C): 38.4 (12-26-22 @ 04:00), Max: 38.5 (12-26-22 @ 03:00)  HR: 103 (12-26-22 @ 08:00) (97 - 130)  BP: 114/66 (12-25-22 @ 19:00) (92/52 - 117/65)  BP(mean): 85 (12-25-22 @ 19:00) (65 - 87)  ABP: 103/52 (12-26-22 @ 08:00) (95/37 - 145/47)  ABP(mean): 69 (12-26-22 @ 08:00) (53 - 78)  RR: 25 (12-26-22 @ 08:00) (18 - 34)  SpO2: 100% (12-26-22 @ 08:00) (94% - 100%)      I&O's Summary    25 Dec 2022 07:01  -  26 Dec 2022 07:00  --------------------------------------------------------  IN: 1815 mL / OUT: 1825 mL / NET: -10 mL    26 Dec 2022 07:01  -  26 Dec 2022 08:37  --------------------------------------------------------  IN: 65 mL / OUT: 0 mL / NET: 65 mL      Physical Exam:  Appearance: No acute distress   Eyes: no scleral icterus   HEENT: Normal oral mucosa. Trach collar in place   Cardiovascular: RRR, S1, S2, no murmurs, rubs, or gallops; no edema  Respiratory: Clear to auscultation bilaterally, no auditory stridor   Gastrointestinal: soft, non-tender, non-distended   Musculoskeletal: No clubbing; no joint deformity   Neurologic: Moving all 4 extremities spontaneously, sensation grossly intact, generalized weakness  Skin: No rashes, or cyanosis  Mode: CPAP with PS, FiO2: 50, PEEP: 5, PS: 10, ITime: 1, MAP: 10, PIP: 17    Labs:                        8.5    15.01 )-----------( 149      ( 26 Dec 2022 00:25 )             27.3     12-26    136  |  94<L>  |  93<H>  ----------------------------<  226<H>  5.0   |  24  |  5.35<H>    Ca    8.4      26 Dec 2022 00:25  Phos  6.6     12-26  Mg     2.9     12-26    TPro  7.1  /  Alb  3.4  /  TBili  0.7  /  DBili  x   /  AST  24  /  ALT  23  /  AlkPhos  94  12-26       Subjective:  No acute events. Plan for HD today.     Medications:  acetaminophen    Suspension .. 650 milliGRAM(s) Oral every 6 hours PRN  aMIOdarone    Tablet 200 milliGRAM(s) Oral daily  aspirin  chewable 81 milliGRAM(s) Oral daily  atorvastatin 80 milliGRAM(s) Oral at bedtime  chlorhexidine 0.12% Liquid 15 milliLiter(s) Oral Mucosa every 12 hours  chlorhexidine 2% Cloths 1 Application(s) Topical <User Schedule>  dextrose 5%. 1000 milliLiter(s) IV Continuous <Continuous>  heparin   Injectable 5000 Unit(s) SubCutaneous every 8 hours  insulin lispro (ADMELOG) corrective regimen sliding scale   SubCutaneous every 6 hours  meropenem  IVPB 500 milliGRAM(s) IV Intermittent every 24 hours  metoclopramide 10 milliGRAM(s) Oral every 8 hours  multivitamin/minerals/iron Oral Solution (CENTRUM) 15 milliLiter(s) Oral daily  pantoprazole  Injectable 40 milliGRAM(s) IV Push daily  senna 2 Tablet(s) Oral at bedtime  sodium chloride 0.9% lock flush 10 milliLiter(s) IV Push every 1 hour PRN  thiamine 100 milliGRAM(s) Enteral Tube daily  vancomycin  IVPB 1000 milliGRAM(s) IV Intermittent once    Vitals:  T(C): 38.4 (12-26-22 @ 04:00), Max: 38.5 (12-26-22 @ 03:00)  HR: 103 (12-26-22 @ 08:00) (97 - 130)  BP: 114/66 (12-25-22 @ 19:00) (92/52 - 117/65)  BP(mean): 85 (12-25-22 @ 19:00) (65 - 87)  ABP: 103/52 (12-26-22 @ 08:00) (95/37 - 145/47)  ABP(mean): 69 (12-26-22 @ 08:00) (53 - 78)  RR: 25 (12-26-22 @ 08:00) (18 - 34)  SpO2: 100% (12-26-22 @ 08:00) (94% - 100%)      I&O's Summary    25 Dec 2022 07:01  -  26 Dec 2022 07:00  --------------------------------------------------------  IN: 1815 mL / OUT: 1825 mL / NET: -10 mL    26 Dec 2022 07:01  -  26 Dec 2022 08:37  --------------------------------------------------------  IN: 65 mL / OUT: 0 mL / NET: 65 mL      Physical Exam:  Appearance: No acute distress   HEENT: Normal oral mucosa. Trach collar in place   Cardiovascular: RRR, S1, S2, no murmurs, rubs, or gallops; no edema  Respiratory: Clear to auscultation bilaterally, no auditory stridor   Gastrointestinal: soft, non-tender, non-distended   Musculoskeletal: No clubbing; no joint deformity   Neurologic: Moving all 4 extremities spontaneously, sensation grossly intact, generalized weakness  Skin: No rashes, or cyanosis  Mode: CPAP with PS, FiO2: 50, PEEP: 5, PS: 10, ITime: 1, MAP: 10, PIP: 17    Labs:                        8.5    15.01 )-----------( 149      ( 26 Dec 2022 00:25 )             27.3     12-26    136  |  94<L>  |  93<H>  ----------------------------<  226<H>  5.0   |  24  |  5.35<H>    Ca    8.4      26 Dec 2022 00:25  Phos  6.6     12-26  Mg     2.9     12-26    TPro  7.1  /  Alb  3.4  /  TBili  0.7  /  DBili  x   /  AST  24  /  ALT  23  /  AlkPhos  94  12-26

## 2022-12-27 LAB
-  CANDIDA TROPICALIS: SIGNIFICANT CHANGE UP
ALBUMIN SERPL ELPH-MCNC: 2.9 G/DL — LOW (ref 3.3–5)
ALP SERPL-CCNC: 84 U/L — SIGNIFICANT CHANGE UP (ref 40–120)
ALT FLD-CCNC: 17 U/L — SIGNIFICANT CHANGE UP (ref 10–45)
ANION GAP SERPL CALC-SCNC: 16 MMOL/L — SIGNIFICANT CHANGE UP (ref 5–17)
AST SERPL-CCNC: 28 U/L — SIGNIFICANT CHANGE UP (ref 10–40)
BILIRUB SERPL-MCNC: 0.7 MG/DL — SIGNIFICANT CHANGE UP (ref 0.2–1.2)
BUN SERPL-MCNC: 75 MG/DL — HIGH (ref 7–23)
CALCIUM SERPL-MCNC: 7.7 MG/DL — LOW (ref 8.4–10.5)
CHLORIDE SERPL-SCNC: 94 MMOL/L — LOW (ref 96–108)
CO2 SERPL-SCNC: 26 MMOL/L — SIGNIFICANT CHANGE UP (ref 22–31)
CREAT SERPL-MCNC: 4.64 MG/DL — HIGH (ref 0.5–1.3)
EGFR: 13 ML/MIN/1.73M2 — LOW
GAS PNL BLDA: SIGNIFICANT CHANGE UP
GLUCOSE BLDC GLUCOMTR-MCNC: 118 MG/DL — HIGH (ref 70–99)
GLUCOSE BLDC GLUCOMTR-MCNC: 134 MG/DL — HIGH (ref 70–99)
GLUCOSE BLDC GLUCOMTR-MCNC: 136 MG/DL — HIGH (ref 70–99)
GLUCOSE BLDC GLUCOMTR-MCNC: 144 MG/DL — HIGH (ref 70–99)
GLUCOSE BLDC GLUCOMTR-MCNC: 156 MG/DL — HIGH (ref 70–99)
GLUCOSE BLDC GLUCOMTR-MCNC: 160 MG/DL — HIGH (ref 70–99)
GLUCOSE BLDC GLUCOMTR-MCNC: 168 MG/DL — HIGH (ref 70–99)
GLUCOSE BLDC GLUCOMTR-MCNC: 169 MG/DL — HIGH (ref 70–99)
GLUCOSE BLDC GLUCOMTR-MCNC: 171 MG/DL — HIGH (ref 70–99)
GLUCOSE BLDC GLUCOMTR-MCNC: 183 MG/DL — HIGH (ref 70–99)
GLUCOSE BLDC GLUCOMTR-MCNC: 191 MG/DL — HIGH (ref 70–99)
GLUCOSE BLDC GLUCOMTR-MCNC: 230 MG/DL — HIGH (ref 70–99)
GLUCOSE BLDC GLUCOMTR-MCNC: 236 MG/DL — HIGH (ref 70–99)
GLUCOSE BLDC GLUCOMTR-MCNC: 256 MG/DL — HIGH (ref 70–99)
GLUCOSE BLDC GLUCOMTR-MCNC: 261 MG/DL — HIGH (ref 70–99)
GLUCOSE BLDC GLUCOMTR-MCNC: 99 MG/DL — SIGNIFICANT CHANGE UP (ref 70–99)
GLUCOSE SERPL-MCNC: 226 MG/DL — HIGH (ref 70–99)
GRAM STN FLD: SIGNIFICANT CHANGE UP
HCT VFR BLD CALC: 23.1 % — LOW (ref 39–50)
HGB BLD-MCNC: 7.2 G/DL — LOW (ref 13–17)
MAGNESIUM SERPL-MCNC: 2.6 MG/DL — SIGNIFICANT CHANGE UP (ref 1.6–2.6)
MCHC RBC-ENTMCNC: 29 PG — SIGNIFICANT CHANGE UP (ref 27–34)
MCHC RBC-ENTMCNC: 31.2 GM/DL — LOW (ref 32–36)
MCV RBC AUTO: 93.1 FL — SIGNIFICANT CHANGE UP (ref 80–100)
METHOD TYPE: SIGNIFICANT CHANGE UP
NRBC # BLD: 0 /100 WBCS — SIGNIFICANT CHANGE UP (ref 0–0)
PHOSPHATE SERPL-MCNC: 5.5 MG/DL — HIGH (ref 2.5–4.5)
PLATELET # BLD AUTO: 109 K/UL — LOW (ref 150–400)
POTASSIUM SERPL-MCNC: 4.5 MMOL/L — SIGNIFICANT CHANGE UP (ref 3.5–5.3)
POTASSIUM SERPL-SCNC: 4.5 MMOL/L — SIGNIFICANT CHANGE UP (ref 3.5–5.3)
PROT SERPL-MCNC: 6.4 G/DL — SIGNIFICANT CHANGE UP (ref 6–8.3)
RBC # BLD: 2.48 M/UL — LOW (ref 4.2–5.8)
RBC # FLD: 18.7 % — HIGH (ref 10.3–14.5)
SODIUM SERPL-SCNC: 136 MMOL/L — SIGNIFICANT CHANGE UP (ref 135–145)
VANCOMYCIN TROUGH SERPL-MCNC: 6.4 UG/ML — LOW (ref 10–20)
WBC # BLD: 15.06 K/UL — HIGH (ref 3.8–10.5)
WBC # FLD AUTO: 15.06 K/UL — HIGH (ref 3.8–10.5)

## 2022-12-27 PROCEDURE — 99232 SBSQ HOSP IP/OBS MODERATE 35: CPT

## 2022-12-27 PROCEDURE — 99233 SBSQ HOSP IP/OBS HIGH 50: CPT | Mod: GC

## 2022-12-27 PROCEDURE — 71045 X-RAY EXAM CHEST 1 VIEW: CPT | Mod: 26

## 2022-12-27 PROCEDURE — 99292 CRITICAL CARE ADDL 30 MIN: CPT | Mod: 24

## 2022-12-27 PROCEDURE — 99291 CRITICAL CARE FIRST HOUR: CPT

## 2022-12-27 RX ORDER — CASPOFUNGIN ACETATE 7 MG/ML
50 INJECTION, POWDER, LYOPHILIZED, FOR SOLUTION INTRAVENOUS EVERY 24 HOURS
Refills: 0 | Status: DISCONTINUED | OUTPATIENT
Start: 2022-12-28 | End: 2023-01-11

## 2022-12-27 RX ORDER — CASPOFUNGIN ACETATE 7 MG/ML
INJECTION, POWDER, LYOPHILIZED, FOR SOLUTION INTRAVENOUS
Refills: 0 | Status: DISCONTINUED | OUTPATIENT
Start: 2022-12-27 | End: 2023-01-11

## 2022-12-27 RX ORDER — CASPOFUNGIN ACETATE 7 MG/ML
70 INJECTION, POWDER, LYOPHILIZED, FOR SOLUTION INTRAVENOUS ONCE
Refills: 0 | Status: COMPLETED | OUTPATIENT
Start: 2022-12-27 | End: 2022-12-27

## 2022-12-27 RX ORDER — MEROPENEM 1 G/30ML
500 INJECTION INTRAVENOUS
Refills: 0 | Status: DISCONTINUED | OUTPATIENT
Start: 2022-12-27 | End: 2023-01-01

## 2022-12-27 RX ORDER — INSULIN HUMAN 100 [IU]/ML
2 INJECTION, SOLUTION SUBCUTANEOUS
Qty: 100 | Refills: 0 | Status: DISCONTINUED | OUTPATIENT
Start: 2022-12-27 | End: 2022-12-30

## 2022-12-27 RX ORDER — DEXTROSE 50 % IN WATER 50 %
50 SYRINGE (ML) INTRAVENOUS
Refills: 0 | Status: DISCONTINUED | OUTPATIENT
Start: 2022-12-27 | End: 2023-01-07

## 2022-12-27 RX ADMIN — HEPARIN SODIUM 5000 UNIT(S): 5000 INJECTION INTRAVENOUS; SUBCUTANEOUS at 08:06

## 2022-12-27 RX ADMIN — AMIODARONE HYDROCHLORIDE 200 MILLIGRAM(S): 400 TABLET ORAL at 05:51

## 2022-12-27 RX ADMIN — CHLORHEXIDINE GLUCONATE 15 MILLILITER(S): 213 SOLUTION TOPICAL at 17:06

## 2022-12-27 RX ADMIN — CHLORHEXIDINE GLUCONATE 1 APPLICATION(S): 213 SOLUTION TOPICAL at 05:33

## 2022-12-27 RX ADMIN — SENNA PLUS 2 TABLET(S): 8.6 TABLET ORAL at 21:14

## 2022-12-27 RX ADMIN — Medication 15 MILLILITER(S): at 12:07

## 2022-12-27 RX ADMIN — HEPARIN SODIUM 5000 UNIT(S): 5000 INJECTION INTRAVENOUS; SUBCUTANEOUS at 00:30

## 2022-12-27 RX ADMIN — Medication 4: at 00:29

## 2022-12-27 RX ADMIN — MEROPENEM 100 MILLIGRAM(S): 1 INJECTION INTRAVENOUS at 08:06

## 2022-12-27 RX ADMIN — CASPOFUNGIN ACETATE 260 MILLIGRAM(S): 7 INJECTION, POWDER, LYOPHILIZED, FOR SOLUTION INTRAVENOUS at 20:01

## 2022-12-27 RX ADMIN — INSULIN HUMAN 2 UNIT(S)/HR: 100 INJECTION, SOLUTION SUBCUTANEOUS at 20:01

## 2022-12-27 RX ADMIN — HEPARIN SODIUM 5000 UNIT(S): 5000 INJECTION INTRAVENOUS; SUBCUTANEOUS at 17:06

## 2022-12-27 RX ADMIN — PANTOPRAZOLE SODIUM 40 MILLIGRAM(S): 20 TABLET, DELAYED RELEASE ORAL at 12:07

## 2022-12-27 RX ADMIN — INSULIN HUMAN 2 UNIT(S)/HR: 100 INJECTION, SOLUTION SUBCUTANEOUS at 08:06

## 2022-12-27 RX ADMIN — Medication 81 MILLIGRAM(S): at 12:07

## 2022-12-27 RX ADMIN — Medication 4: at 05:51

## 2022-12-27 RX ADMIN — MEROPENEM 100 MILLIGRAM(S): 1 INJECTION INTRAVENOUS at 21:14

## 2022-12-27 RX ADMIN — CHLORHEXIDINE GLUCONATE 15 MILLILITER(S): 213 SOLUTION TOPICAL at 05:50

## 2022-12-27 RX ADMIN — ATORVASTATIN CALCIUM 80 MILLIGRAM(S): 80 TABLET, FILM COATED ORAL at 21:14

## 2022-12-27 RX ADMIN — Medication 100 MILLIGRAM(S): at 12:07

## 2022-12-27 NOTE — PROGRESS NOTE ADULT - ASSESSMENT
62M CAD s/p CABG, DM, HTN, presented as a trasnfer from [place] for STEMI, found to have pancreatitis not necrotizing and cholecystitis on CT AP, admitted to to SICU for respiratory failure due to ARDS. Moved to the CTU for ECMO for cardiogenic shock vs ARDS, ECMO decannulated on 12/8 with IABP placement and mitral clip and removed on 12/17, s/p trac on 12/16/22, now on trach collar.    Febrile, tachycardic, hypotensive   Leukocytosis   CXR on 12/26: Slight improvement in the pulmonary vascular congestion  Blood Cx remained negative   Most recent Blood Cx on 12/18: NGTD  Trach Cx on 12/19: NGTD  LIJ central line replaced by R IJ central line on 12/25  S/P meropenem, vanco and diflucan  Received 1 dose of Vanc and started on Meropenem     #fever, Persistent leukocytosis   #Pancreatitis - improved  #Cardiogenic shock - resolved    Recommendations:  -s/p 1 dose of Vanc   -Increase Meropenem to 500 mg q 12hrs   -check Vanc level   dose prn     -If fever persists, will need a repeat CT C/A/P   in no further fever and cultures negative, we can dc antibiotics  again   would also send rvp                  62M CAD s/p CABG, DM, HTN, presented as a trasnfer from [place] for STEMI, found to have pancreatitis not necrotizing and cholecystitis on CT AP, admitted to to SICU for respiratory failure due to ARDS. Moved to the CTU for ECMO for cardiogenic shock vs ARDS, ECMO decannulated on 12/8 with IABP placement and mitral clip and removed on 12/17, s/p trac on 12/16/22, now on trach collar.    Febrile, tachycardic, hypotensive   Leukocytosis   CXR on 12/26: Slight improvement in the pulmonary vascular congestion  Blood Cx remained negative   Most recent Blood Cx on 12/18: NGTD  Trach Cx on 12/19: NGTD  LIJ central line replaced by R IJ central line on 12/25  S/P meropenem, vanco and diflucan  Received 1 dose of Vanc and started on Meropenem     #fever, Persistent leukocytosis   #Pancreatitis - improved  #Cardiogenic shock - resolved    Recommendations:  -s/p 1 dose of Vanc   -Increase Meropenem to 500 mg q 12hrs   -check Vanc level   dose prn     -If fever persists, will need a repeat CT C/A/P   in no further fever and cultures negative, we can dc antibiotics  again   would also send rvp                  62M CAD s/p CABG, DM, HTN, presented as a trasnfer from [place] for STEMI, found to have pancreatitis not necrotizing and cholecystitis on CT AP, admitted to to SICU for respiratory failure due to ARDS. Moved to the CTU for ECMO for cardiogenic shock vs ARDS, ECMO decannulated on 12/8 with IABP placement and mitral clip and removed on 12/17, s/p trac on 12/16/22, now on trach collar.    Febrile, tachycardic, hypotensive   Leukocytosis   CXR on 12/26: Slight improvement in the pulmonary vascular congestion  Blood Cx remained negative   Most recent Blood Cx on 12/18: NGTD  Trach Cx on 12/19: NGTD  LIJ central line replaced by R IJ central line on 12/25  S/P meropenem, vanco and diflucan  Received 1 dose of Vanc and started on Meropenem     #fever, Persistent leukocytosis   #Pancreatitis - improved  #Cardiogenic shock - resolved    Recommendations:  -s/p 1 dose of Vanc   -Increase Meropenem to 500 mg q 12hrs   -check Vanc level   dose prn     -If fever persists, will need a repeat CT C/A/P   in no further fever and cultures negative, we can dc antibiotics  again   would also send rvp

## 2022-12-27 NOTE — PROGRESS NOTE ADULT - SUBJECTIVE AND OBJECTIVE BOX
infectious diseases progress note:    Patient is a 62y old  Male who presents with a chief complaint of Acute cholecystitis, gallstone pancreatitis (27 Dec 2022 09:11)        Acute pancreatitis without infection or necrosis               Allergies    No Known Allergies    Intolerances        ANTIBIOTICS/RELEVANT:  antimicrobials  meropenem  IVPB 500 milliGRAM(s) IV Intermittent every 24 hours    immunologic:    OTHER:  acetaminophen    Suspension .. 650 milliGRAM(s) Oral every 6 hours PRN  aMIOdarone    Tablet 200 milliGRAM(s) Oral daily  aspirin  chewable 81 milliGRAM(s) Oral daily  atorvastatin 80 milliGRAM(s) Oral at bedtime  chlorhexidine 0.12% Liquid 15 milliLiter(s) Oral Mucosa every 12 hours  chlorhexidine 2% Cloths 1 Application(s) Topical <User Schedule>  dextrose 50% Injectable 50 milliLiter(s) IV Push every 15 minutes  heparin   Injectable 5000 Unit(s) SubCutaneous every 8 hours  insulin regular Infusion 2 Unit(s)/Hr IV Continuous <Continuous>  multivitamin/minerals/iron Oral Solution (CENTRUM) 15 milliLiter(s) Oral daily  pantoprazole  Injectable 40 milliGRAM(s) IV Push daily  senna 2 Tablet(s) Oral at bedtime  sodium chloride 0.9% lock flush 10 milliLiter(s) IV Push every 1 hour PRN  thiamine 100 milliGRAM(s) Enteral Tube daily      Objective:  Vital Signs Last 24 Hrs  T(C): 36.1 (27 Dec 2022 08:00), Max: 38.3 (26 Dec 2022 17:10)  T(F): 96.9 (27 Dec 2022 08:00), Max: 100.9 (26 Dec 2022 17:10)  HR: 106 (27 Dec 2022 10:00) (102 - 118)  BP: --  BP(mean): --  RR: 28 (27 Dec 2022 10:00) (9 - 35)  SpO2: 100% (27 Dec 2022 10:00) (97% - 100%)    Parameters below as of 27 Dec 2022 09:20  Patient On (Oxygen Delivery Method): tracheostomy collar  O2 Flow (L/min): 10  O2 Concentration (%): 40       Eyes:INOCENCIO, EOMI  Ear/Nose/Throat: no oral lesion, no sinus tenderness on percussion	  Neck:no JVD, no lymphadenopathy, supple  Respiratory: CTA lore  Cardiovascular: S1S2 RRR, no murmurs  Gastrointestinal:soft, (+) BS, no HSM  Extremities:no e/e/c        LABS:                        7.2    15.06 )-----------( 109      ( 27 Dec 2022 00:29 )             23.1         136  |  94<L>  |  75<H>  ----------------------------<  226<H>  4.5   |  26  |  4.64<H>    Ca    7.7<L>      27 Dec 2022 00:28  Phos  5.5       Mg     2.6         TPro  6.4  /  Alb  2.9<L>  /  TBili  0.7  /  DBili  x   /  AST  28  /  ALT  17  /  AlkPhos  84        Urinalysis Basic - ( 26 Dec 2022 13:19 )    Color: Yellow / Appearance: Clear / S.013 / pH: x  Gluc: x / Ketone: Negative  / Bili: Negative / Urobili: Negative   Blood: x / Protein: 100 mg/dL / Nitrite: Negative   Leuk Esterase: Moderate / RBC: >50 /hpf / WBC 35 /HPF   Sq Epi: x / Non Sq Epi: 2 / Bacteria: Few          MICROBIOLOGY:    RECENT CULTURES:   @ 11:54 Trach Asp Tracheal Aspirate       No polymorphonuclear leukocytes per low power field  Few Squamous epithelial cells per low power field  Moderate Gram Positive Rods per oil power field              @ 06:38 .Blood Blood-Peripheral                No growth to date.     @ 02:46 .Blood Blood-Peripheral                No growth to date.          RESPIRATORY CULTURES:              RADIOLOGY & ADDITIONAL STUDIES:        Pager [phone]  After 5 pm/weekends or if no response :[phone] infectious diseases progress note:    Patient is a 62y old  Male who presents with a chief complaint of Acute cholecystitis, gallstone pancreatitis (27 Dec 2022 09:11)        Acute pancreatitis without infection or necrosis               Allergies    No Known Allergies    Intolerances        ANTIBIOTICS/RELEVANT:  antimicrobials  meropenem  IVPB 500 milliGRAM(s) IV Intermittent every 24 hours    immunologic:    OTHER:  acetaminophen    Suspension .. 650 milliGRAM(s) Oral every 6 hours PRN  aMIOdarone    Tablet 200 milliGRAM(s) Oral daily  aspirin  chewable 81 milliGRAM(s) Oral daily  atorvastatin 80 milliGRAM(s) Oral at bedtime  chlorhexidine 0.12% Liquid 15 milliLiter(s) Oral Mucosa every 12 hours  chlorhexidine 2% Cloths 1 Application(s) Topical <User Schedule>  dextrose 50% Injectable 50 milliLiter(s) IV Push every 15 minutes  heparin   Injectable 5000 Unit(s) SubCutaneous every 8 hours  insulin regular Infusion 2 Unit(s)/Hr IV Continuous <Continuous>  multivitamin/minerals/iron Oral Solution (CENTRUM) 15 milliLiter(s) Oral daily  pantoprazole  Injectable 40 milliGRAM(s) IV Push daily  senna 2 Tablet(s) Oral at bedtime  sodium chloride 0.9% lock flush 10 milliLiter(s) IV Push every 1 hour PRN  thiamine 100 milliGRAM(s) Enteral Tube daily      Objective:  Vital Signs Last 24 Hrs  T(C): 36.1 (27 Dec 2022 08:00), Max: 38.3 (26 Dec 2022 17:10)  T(F): 96.9 (27 Dec 2022 08:00), Max: 100.9 (26 Dec 2022 17:10)  HR: 106 (27 Dec 2022 10:00) (102 - 118)  BP: --  BP(mean): --  RR: 28 (27 Dec 2022 10:00) (9 - 35)  SpO2: 100% (27 Dec 2022 10:00) (97% - 100%)    Parameters below as of 27 Dec 2022 09:20  Patient On (Oxygen Delivery Method): tracheostomy collar  O2 Flow (L/min): 10  O2 Concentration (%): 40       Eyes:INOCENCIO, EOMI  Ear/Nose/Throat: no oral lesion, no sinus tenderness on percussion	  Neck:no JVD, no lymphadenopathy, supple  Respiratory: CTA lore  Cardiovascular: S1S2 RRR, no murmurs  Gastrointestinal:soft, (+) BS, no HSM  Extremities:no e/e/c        LABS:                        7.2    15.06 )-----------( 109      ( 27 Dec 2022 00:29 )             23.1         136  |  94<L>  |  75<H>  ----------------------------<  226<H>  4.5   |  26  |  4.64<H>    Ca    7.7<L>      27 Dec 2022 00:28  Phos  5.5       Mg     2.6         TPro  6.4  /  Alb  2.9<L>  /  TBili  0.7  /  DBili  x   /  AST  28  /  ALT  17  /  AlkPhos  84        Urinalysis Basic - ( 26 Dec 2022 13:19 )    Color: Yellow / Appearance: Clear / S.013 / pH: x  Gluc: x / Ketone: Negative  / Bili: Negative / Urobili: Negative   Blood: x / Protein: 100 mg/dL / Nitrite: Negative   Leuk Esterase: Moderate / RBC: >50 /hpf / WBC 35 /HPF   Sq Epi: x / Non Sq Epi: 2 / Bacteria: Few          MICROBIOLOGY:    RECENT CULTURES:   @ 11:54 Trach Asp Tracheal Aspirate       No polymorphonuclear leukocytes per low power field  Few Squamous epithelial cells per low power field  Moderate Gram Positive Rods per oil power field              @ 06:38 .Blood Blood-Peripheral                No growth to date.     @ 02:46 .Blood Blood-Peripheral                No growth to date.          RESPIRATORY CULTURES:              RADIOLOGY & ADDITIONAL STUDIES:        Pager [phone]  After 5 pm/weekends or if no response :[phone] infectious diseases progress note:    Patient is a 62y old  Male who presents with a chief complaint of Acute cholecystitis, gallstone pancreatitis (27 Dec 2022 09:11)        Acute pancreatitis without infection or necrosis               Allergies    No Known Allergies    Intolerances        ANTIBIOTICS/RELEVANT:  antimicrobials  meropenem  IVPB 500 milliGRAM(s) IV Intermittent every 24 hours    immunologic:    OTHER:  acetaminophen    Suspension .. 650 milliGRAM(s) Oral every 6 hours PRN  aMIOdarone    Tablet 200 milliGRAM(s) Oral daily  aspirin  chewable 81 milliGRAM(s) Oral daily  atorvastatin 80 milliGRAM(s) Oral at bedtime  chlorhexidine 0.12% Liquid 15 milliLiter(s) Oral Mucosa every 12 hours  chlorhexidine 2% Cloths 1 Application(s) Topical <User Schedule>  dextrose 50% Injectable 50 milliLiter(s) IV Push every 15 minutes  heparin   Injectable 5000 Unit(s) SubCutaneous every 8 hours  insulin regular Infusion 2 Unit(s)/Hr IV Continuous <Continuous>  multivitamin/minerals/iron Oral Solution (CENTRUM) 15 milliLiter(s) Oral daily  pantoprazole  Injectable 40 milliGRAM(s) IV Push daily  senna 2 Tablet(s) Oral at bedtime  sodium chloride 0.9% lock flush 10 milliLiter(s) IV Push every 1 hour PRN  thiamine 100 milliGRAM(s) Enteral Tube daily      Objective:  Vital Signs Last 24 Hrs  T(C): 36.1 (27 Dec 2022 08:00), Max: 38.3 (26 Dec 2022 17:10)  T(F): 96.9 (27 Dec 2022 08:00), Max: 100.9 (26 Dec 2022 17:10)  HR: 106 (27 Dec 2022 10:00) (102 - 118)  BP: --  BP(mean): --  RR: 28 (27 Dec 2022 10:00) (9 - 35)  SpO2: 100% (27 Dec 2022 10:00) (97% - 100%)    Parameters below as of 27 Dec 2022 09:20  Patient On (Oxygen Delivery Method): tracheostomy collar  O2 Flow (L/min): 10  O2 Concentration (%): 40       Eyes:INOCENCIO, EOMI  Ear/Nose/Throat: no oral lesion, no sinus tenderness on percussion	  Neck:no JVD, no lymphadenopathy, supple  Respiratory: CTA lore  Cardiovascular: S1S2 RRR, no murmurs  Gastrointestinal:soft, (+) BS, no HSM  Extremities:no e/e/c        LABS:                        7.2    15.06 )-----------( 109      ( 27 Dec 2022 00:29 )             23.1         136  |  94<L>  |  75<H>  ----------------------------<  226<H>  4.5   |  26  |  4.64<H>    Ca    7.7<L>      27 Dec 2022 00:28  Phos  5.5       Mg     2.6         TPro  6.4  /  Alb  2.9<L>  /  TBili  0.7  /  DBili  x   /  AST  28  /  ALT  17  /  AlkPhos  84        Urinalysis Basic - ( 26 Dec 2022 13:19 )    Color: Yellow / Appearance: Clear / S.013 / pH: x  Gluc: x / Ketone: Negative  / Bili: Negative / Urobili: Negative   Blood: x / Protein: 100 mg/dL / Nitrite: Negative   Leuk Esterase: Moderate / RBC: >50 /hpf / WBC 35 /HPF   Sq Epi: x / Non Sq Epi: 2 / Bacteria: Few          MICROBIOLOGY:    RECENT CULTURES:   @ 11:54 Trach Asp Tracheal Aspirate       No polymorphonuclear leukocytes per low power field  Few Squamous epithelial cells per low power field  Moderate Gram Positive Rods per oil power field              @ 06:38 .Blood Blood-Peripheral                No growth to date.     @ 02:46 .Blood Blood-Peripheral                No growth to date.          RESPIRATORY CULTURES:              RADIOLOGY & ADDITIONAL STUDIES:        Pager [phone]  After 5 pm/weekends or if no response :[phone]

## 2022-12-27 NOTE — PROGRESS NOTE ADULT - SUBJECTIVE AND OBJECTIVE BOX
CRITICAL CARE ATTENDING - CTICU    MEDICATIONS  (STANDING):  aMIOdarone    Tablet 200 milliGRAM(s) Oral daily  aspirin  chewable 81 milliGRAM(s) Oral daily  atorvastatin 80 milliGRAM(s) Oral at bedtime  chlorhexidine 0.12% Liquid 15 milliLiter(s) Oral Mucosa every 12 hours  chlorhexidine 2% Cloths 1 Application(s) Topical <User Schedule>  heparin   Injectable 5000 Unit(s) SubCutaneous every 8 hours  insulin lispro (ADMELOG) corrective regimen sliding scale   SubCutaneous every 6 hours  meropenem  IVPB 500 milliGRAM(s) IV Intermittent every 24 hours  multivitamin/minerals/iron Oral Solution (CENTRUM) 15 milliLiter(s) Oral daily  pantoprazole  Injectable 40 milliGRAM(s) IV Push daily  senna 2 Tablet(s) Oral at bedtime  thiamine 100 milliGRAM(s) Enteral Tube daily                            7.2    15.06 )-----------( 109      ( 27 Dec 2022 00:29 )             23.1           136  |  94<L>  |  75<H>  ----------------------------<  226<H>  4.5   |  26  |  4.64<H>    Ca    7.7<L>      27 Dec 2022 00:28  Phos  5.5       Mg     2.6         TPro  6.4  /  Alb  2.9<L>  /  TBili  0.7  /  DBili  x   /  AST  28  /  ALT  17  /  AlkPhos  84            Mode: standby      Daily     Daily Weight in k.4 (27 Dec 2022 00:00)       @ :  -   @ 07:00  --------------------------------------------------------  IN: 1815 mL / OUT: 1825 mL / NET: -10 mL     @ 07:  -   @ 06:58  --------------------------------------------------------  IN: 2630 mL / OUT: 945 mL / NET: 1685 mL      Critically Ill patient  : [ ] preoperative , [x] Non Operative    Requires :  [x ] Arterial Line   [ x] Central Line  [ ] PA catheter  [ ] IABP [ ] ECMO  [ ] LVAD  [x ] Ventilator  [ ] pacemaker [x] Trach  [x] HD                      [x ] ABG's     [x ] Pulse Oxymetry Monitoring  Bedside evaluation , monitoring , treatment of hemodynamics , fluids , IVP/ IVCD meds.      Diagnosis:      - [facility] - arrest in [facility]     ECMO Decannulation      POD - IABP placed in cath lab / IABP dc'ed     POD - Mitral Clip x1, TRACH 7 Merari XLT prox with ENT    MI     Acute Pancreatitis     ARDS     Hemodynamic lability,  instability. Requires IVCD [ ] vasopressors [ ]  inotropes  [ ] vasodilator  [x]IVSS fluid  to maintain MAP, perfusion, C.I.     Post Arrest Shock state    Respiratory Failure - Tracheostomy     Requires chest PT, pulmonary toilet, ambu bagging, suctioning to maintain SaO2,  patent airway and treat atelectasis.     Ventilator Management:  [x ]AC-rest    [ x]CPAP-PS Wean    [x]Trach Collar     [ ]Extubate    [ ] T-Piece  [ ]peep>5     Difficult weaning process - multiple organ system involvement in critically ill patient     CHF- acute [ x]   chronic [  ]    systolic [ x]   diatolic [ ]          - Echo- EF -   30-40% Severe segmental LV systolic dysfunction          [ ] RV dysfunction     - Cxr-cardiomegally, edema          - Clinical-  [ ]inotropes   [ ]pressors  [ ]diuresis   [ ]IABP      [ ]LVAD   [x]Respiratory Failure.     Cardiogenic Shock - resolving    [ x] Hemodialysis yesterday [ ] Hemofiltration:    [x ] negative fluid balance [ ] even fluid balance [ ] positive fluid balance, in a hemodynamically unstable patient.     Thrombocytopenia     Hypotension     DM- ISS    Requires bedside physical therapy, mobilization and total long-term care.     Tolerates NG / NJ feeds at [x] goal rate  [ ] trophic rate    [ ]       rate     fevers     UA c/w infection             By signing my name below, I, Rosio Alonzo, attest that this documentation has been prepared under the direction and in the presence of Finn Nathalie, MD.   Electronically Signed: Brian Burnett 12-27-22 @ 06:58      Discussed with CT surgeon, Physician Assistant - Nurse Practitioner- Critical care medicine team.   Discussed at  AM / PM rounds.   Chart, labs , films reviewed.    Cumulative Critical Care Time Given Today:  CRITICAL CARE ATTENDING - CTICU    MEDICATIONS  (STANDING):  aMIOdarone    Tablet 200 milliGRAM(s) Oral daily  aspirin  chewable 81 milliGRAM(s) Oral daily  atorvastatin 80 milliGRAM(s) Oral at bedtime  chlorhexidine 0.12% Liquid 15 milliLiter(s) Oral Mucosa every 12 hours  chlorhexidine 2% Cloths 1 Application(s) Topical <User Schedule>  heparin   Injectable 5000 Unit(s) SubCutaneous every 8 hours  insulin lispro (ADMELOG) corrective regimen sliding scale   SubCutaneous every 6 hours  meropenem  IVPB 500 milliGRAM(s) IV Intermittent every 24 hours  multivitamin/minerals/iron Oral Solution (CENTRUM) 15 milliLiter(s) Oral daily  pantoprazole  Injectable 40 milliGRAM(s) IV Push daily  senna 2 Tablet(s) Oral at bedtime  thiamine 100 milliGRAM(s) Enteral Tube daily                            7.2    15.06 )-----------( 109      ( 27 Dec 2022 00:29 )             23.1           136  |  94<L>  |  75<H>  ----------------------------<  226<H>  4.5   |  26  |  4.64<H>    Ca    7.7<L>      27 Dec 2022 00:28  Phos  5.5       Mg     2.6         TPro  6.4  /  Alb  2.9<L>  /  TBili  0.7  /  DBili  x   /  AST  28  /  ALT  17  /  AlkPhos  84            Mode: standby      Daily     Daily Weight in k.4 (27 Dec 2022 00:00)       @ :  -   @ 07:00  --------------------------------------------------------  IN: 1815 mL / OUT: 1825 mL / NET: -10 mL     @ 07:  -   @ 06:58  --------------------------------------------------------  IN: 2630 mL / OUT: 945 mL / NET: 1685 mL      Critically Ill patient  : [ ] preoperative , [x] Non Operative    Requires :  [x ] Arterial Line   [ x] Central Line  [ ] PA catheter  [ ] IABP [ ] ECMO  [ ] LVAD  [x ] Ventilator  [ ] pacemaker [x] Trach  [x] HD                      [x ] ABG's     [x ] Pulse Oxymetry Monitoring  Bedside evaluation , monitoring , treatment of hemodynamics , fluids , IVP/ IVCD meds.      Diagnosis:      - [facility] - arrest in [facility]     ECMO Decannulation      POD - IABP placed in cath lab / IABP dc'ed     POD - Mitral Clip x1, TRACH 7 Merari XLT prox with ENT    MI     Acute Pancreatitis     ARDS     Hemodynamic lability,  instability. Requires IVCD [ ] vasopressors [ ]  inotropes  [ ] vasodilator  [x]IVSS fluid  to maintain MAP, perfusion, C.I.     Post Arrest Shock state    Respiratory Failure - Tracheostomy     Requires chest PT, pulmonary toilet, ambu bagging, suctioning to maintain SaO2,  patent airway and treat atelectasis.     Ventilator Management:  [x ]AC-rest    [ x]CPAP-PS Wean    [x]Trach Collar     [ ]Extubate    [ ] T-Piece  [ ]peep>5     Difficult weaning process - multiple organ system involvement in critically ill patient     CHF- acute [ x]   chronic [  ]    systolic [ x]   diatolic [ ]          - Echo- EF -   30-40% Severe segmental LV systolic dysfunction          [ ] RV dysfunction     - Cxr-cardiomegally, edema          - Clinical-  [ ]inotropes   [ ]pressors  [ ]diuresis   [ ]IABP      [ ]LVAD   [x]Respiratory Failure.     Cardiogenic Shock - resolving    [ x] Hemodialysis yesterday [ ] Hemofiltration:    [x ] negative fluid balance [ ] even fluid balance [ ] positive fluid balance, in a hemodynamically unstable patient.     Thrombocytopenia     Hypotension     DM- ISS    Requires bedside physical therapy, mobilization and total group home care.     Tolerates NG / NJ feeds at [x] goal rate  [ ] trophic rate    [ ]       rate     fevers     UA c/w infection             By signing my name below, I, Rosio Alonzo, attest that this documentation has been prepared under the direction and in the presence of Finn Nathalie, MD.   Electronically Signed: Brian Burnett 12-27-22 @ 06:58      Discussed with CT surgeon, Physician Assistant - Nurse Practitioner- Critical care medicine team.   Discussed at  AM / PM rounds.   Chart, labs , films reviewed.    Cumulative Critical Care Time Given Today:  CRITICAL CARE ATTENDING - [facility]    MEDICATIONS  (STANDING):  aMIOdarone    Tablet 200 milliGRAM(s) Oral daily  aspirin  chewable 81 milliGRAM(s) Oral daily  atorvastatin 80 milliGRAM(s) Oral at bedtime  chlorhexidine 0.12% Liquid 15 milliLiter(s) Oral Mucosa every 12 hours  chlorhexidine 2% Cloths 1 Application(s) Topical <User Schedule>  heparin   Injectable 5000 Unit(s) SubCutaneous every 8 hours  insulin lispro (ADMELOG) corrective regimen sliding scale   SubCutaneous every 6 hours  meropenem  IVPB 500 milliGRAM(s) IV Intermittent every 24 hours  multivitamin/minerals/iron Oral Solution (CENTRUM) 15 milliLiter(s) Oral daily  pantoprazole  Injectable 40 milliGRAM(s) IV Push daily  senna 2 Tablet(s) Oral at bedtime  thiamine 100 milliGRAM(s) Enteral Tube daily                            7.2    15.06 )-----------( 109      ( 27 Dec 2022 00:29 )             23.1           136  |  94<L>  |  75<H>  ----------------------------<  226<H>  4.5   |  26  |  4.64<H>    Ca    7.7<L>      27 Dec 2022 00:28  Phos  5.5       Mg     2.6         TPro  6.4  /  Alb  2.9<L>  /  TBili  0.7  /  DBili  x   /  AST  28  /  ALT  17  /  AlkPhos  84            Mode: standby      Daily     Daily Weight in k.4 (27 Dec 2022 00:00)       @ :  -   @ 07:00  --------------------------------------------------------  IN: 1815 mL / OUT: 1825 mL / NET: -10 mL     @ 07:  -   @ 06:58  --------------------------------------------------------  IN: 2630 mL / OUT: 945 mL / NET: 1685 mL      Critically Ill patient  : [ ] preoperative , [x] Non Operative    Requires :  [x ] Arterial Line   [ x] Central Line  [ ] PA catheter  [ ] IABP [ ] ECMO  [ ] LVAD  [x ] Ventilator  [ ] pacemaker [x] Trach  [x] HD                      [x ] ABG's     [x ] Pulse Oxymetry Monitoring  Bedside evaluation , monitoring , treatment of hemodynamics , fluids , IVP/ IVCD meds.      Diagnosis:      - [facility] - arrest in [facility]     ECMO Decannulation      POD - IABP placed in cath lab / IABP dc'ed     POD - Mitral Clip x1, TRACH 7 Merari XLT prox with ENT    MI     Acute Pancreatitis     ARDS     Hemodynamic lability,  instability. Requires IVCD [ ] vasopressors [ ]  inotropes  [ ] vasodilator  [x]IVSS fluid  to maintain MAP, perfusion, C.I.     Post Arrest Shock state    Respiratory Failure - Tracheostomy     Requires chest PT, pulmonary toilet, ambu bagging, suctioning to maintain SaO2,  patent airway and treat atelectasis.     Ventilator Management:  [x ]AC-rest    [ x]CPAP-PS Wean    [x]Trach Collar     [ ]Extubate    [ ] T-Piece  [ ]peep>5     Difficult weaning process - multiple organ system involvement in critically ill patient     CHF- acute [ x]   chronic [  ]    systolic [ x]   diatolic [ ]          - Echo- EF -   30-40% Severe segmental LV systolic dysfunction          [ ] RV dysfunction     - Cxr-cardiomegally, edema          - Clinical-  [ ]inotropes   [ ]pressors  [ ]diuresis   [ ]IABP      [ ]LVAD   [x]Respiratory Failure.     Cardiogenic Shock - resolving    [ x] Hemodialysis yesterday [ ] Hemofiltration:    [x ] negative fluid balance [ ] even fluid balance [ ] positive fluid balance, in a hemodynamically unstable patient.     Thrombocytopenia     Hypotension     DM- ISS    Requires bedside physical therapy, mobilization and total jail care.     Tolerates NG / NJ feeds at [x] goal rate  [ ] trophic rate    [ ]       rate     fevers     UA c/w infection             By signing my name below, I, Rosio Alonzo, attest that this documentation has been prepared under the direction and in the presence of Finn Nathalie, MD.   Electronically Signed: Brian Burnett 12-27-22 @ 06:58      Discussed with CT surgeon, Physician Assistant - Nurse Practitioner- Critical care medicine team.   Discussed at  AM / PM rounds.   Chart, labs , films reviewed.    Cumulative Critical Care Time Given Today:  CRITICAL CARE ATTENDING - CTICU    MEDICATIONS  (STANDING):  aMIOdarone    Tablet 200 milliGRAM(s) Oral daily  aspirin  chewable 81 milliGRAM(s) Oral daily  atorvastatin 80 milliGRAM(s) Oral at bedtime  chlorhexidine 0.12% Liquid 15 milliLiter(s) Oral Mucosa every 12 hours  chlorhexidine 2% Cloths 1 Application(s) Topical <User Schedule>  heparin   Injectable 5000 Unit(s) SubCutaneous every 8 hours  insulin lispro (ADMELOG) corrective regimen sliding scale   SubCutaneous every 6 hours  meropenem  IVPB 500 milliGRAM(s) IV Intermittent every 24 hours  multivitamin/minerals/iron Oral Solution (CENTRUM) 15 milliLiter(s) Oral daily  pantoprazole  Injectable 40 milliGRAM(s) IV Push daily  senna 2 Tablet(s) Oral at bedtime  thiamine 100 milliGRAM(s) Enteral Tube daily                            7.2    15.06 )-----------( 109      ( 27 Dec 2022 00:29 )             23.1           136  |  94<L>  |  75<H>  ----------------------------<  226<H>  4.5   |  26  |  4.64<H>    Ca    7.7<L>      27 Dec 2022 00:28  Phos  5.5       Mg     2.6         TPro  6.4  /  Alb  2.9<L>  /  TBili  0.7  /  DBili  x   /  AST  28  /  ALT  17  /  AlkPhos  84            Mode: standby      Daily     Daily Weight in k.4 (27 Dec 2022 00:00)       @ :  -   @ 07:00  --------------------------------------------------------  IN: 1815 mL / OUT: 1825 mL / NET: -10 mL     @ 07:  -   @ 06:58  --------------------------------------------------------  IN: 2630 mL / OUT: 945 mL / NET: 1685 mL      Critically Ill patient  : [ ] preoperative , [x] Non Operative    Requires :  [x ] Arterial Line   [ x] Central Line  [ ] PA catheter  [ ] IABP [ ] ECMO  [ ] LVAD  [x ] Ventilator  [ ] pacemaker [x] Trach  [x] HD                      [x ] ABG's     [x ] Pulse Oxymetry Monitoring  Bedside evaluation , monitoring , treatment of hemodynamics , fluids , IVP/ IVCD meds.      Diagnosis:      - [facility] - arrest in [facility]     ECMO Decannulation      POD - IABP placed in cath lab / IABP dc'ed     POD - Mitral Clip x1, TRACH 7 Merari XLT prox with ENT    MI     Acute Pancreatitis     ARDS     Hemodynamic lability,  instability. Requires IVCD [ ] vasopressors [ ]  inotropes  [ ] vasodilator  [x]IVSS fluid  to maintain MAP, perfusion, C.I.     Post Arrest Shock state - resolving    Respiratory Failure - Tracheostomy     Requires chest PT, pulmonary toilet, ambu bagging, suctioning to maintain SaO2,  patent airway and treat atelectasis.     Ventilator Management:  [x ]AC-rest    [ x]CPAP-PS Wean    [x]Trach Collar     [ ]Extubate    [ ] T-Piece  [ ]peep>5     Difficult weaning process - multiple organ system involvement in critically ill patient     CHF- acute [ x]   chronic [  ]    systolic [ x]   diatolic [ ]          - Echo- EF -   30-40% Severe segmental LV systolic dysfunction          [ ] RV dysfunction     - Cxr-cardiomegally, edema          - Clinical-  [ ]inotropes   [ ]pressors  [ ]diuresis   [ ]IABP      [ ]LVAD   [x]Respiratory Failure.     Cardiogenic Shock - resolving    [ x] Hemodialysis yesterday / today  [ ] Hemofiltration:    [x ] negative fluid balance [ ] even fluid balance [ ] positive fluid balance, in a hemodynamically unstable patient.     Thrombocytopenia     Hypotension     DM- ISS    Requires bedside physical therapy, mobilization and total shelter care.     Tolerates NG / NJ feeds at [x] goal rate  [ ] trophic rate    [ ]       rate     fevers     UA c/w infection             By signing my name below, I, Rosio Alonzo, attest that this documentation has been prepared under the direction and in the presence of Finn Zelaya MD.   Electronically Signed: Brian Burnett -27-22 @ 06:58    I, Finn Zelaya, personally performed the services described in this documentation. All medical record entries made by the scribe were at my direction and in my presence. I have reviewed the chart and agree that the record reflects my personal performance and is accurate and complete.   Finn Zelaya MD.       Discussed with CT surgeon, Physician Assistant - Nurse Practitioner- Critical care medicine team.   Discussed at  AM / PM rounds.   Chart, labs , films reviewed.    Cumulative Critical Care Time Given Today:  40 min CRITICAL CARE ATTENDING - CTICU    MEDICATIONS  (STANDING):  aMIOdarone    Tablet 200 milliGRAM(s) Oral daily  aspirin  chewable 81 milliGRAM(s) Oral daily  atorvastatin 80 milliGRAM(s) Oral at bedtime  chlorhexidine 0.12% Liquid 15 milliLiter(s) Oral Mucosa every 12 hours  chlorhexidine 2% Cloths 1 Application(s) Topical <User Schedule>  heparin   Injectable 5000 Unit(s) SubCutaneous every 8 hours  insulin lispro (ADMELOG) corrective regimen sliding scale   SubCutaneous every 6 hours  meropenem  IVPB 500 milliGRAM(s) IV Intermittent every 24 hours  multivitamin/minerals/iron Oral Solution (CENTRUM) 15 milliLiter(s) Oral daily  pantoprazole  Injectable 40 milliGRAM(s) IV Push daily  senna 2 Tablet(s) Oral at bedtime  thiamine 100 milliGRAM(s) Enteral Tube daily                            7.2    15.06 )-----------( 109      ( 27 Dec 2022 00:29 )             23.1           136  |  94<L>  |  75<H>  ----------------------------<  226<H>  4.5   |  26  |  4.64<H>    Ca    7.7<L>      27 Dec 2022 00:28  Phos  5.5       Mg     2.6         TPro  6.4  /  Alb  2.9<L>  /  TBili  0.7  /  DBili  x   /  AST  28  /  ALT  17  /  AlkPhos  84            Mode: standby      Daily     Daily Weight in k.4 (27 Dec 2022 00:00)       @ :  -   @ 07:00  --------------------------------------------------------  IN: 1815 mL / OUT: 1825 mL / NET: -10 mL     @ 07:  -   @ 06:58  --------------------------------------------------------  IN: 2630 mL / OUT: 945 mL / NET: 1685 mL      Critically Ill patient  : [ ] preoperative , [x] Non Operative    Requires :  [x ] Arterial Line   [ x] Central Line  [ ] PA catheter  [ ] IABP [ ] ECMO  [ ] LVAD  [x ] Ventilator  [ ] pacemaker [x] Trach  [x] HD                      [x ] ABG's     [x ] Pulse Oxymetry Monitoring  Bedside evaluation , monitoring , treatment of hemodynamics , fluids , IVP/ IVCD meds.      Diagnosis:      - [facility] - arrest in [facility]     ECMO Decannulation      POD - IABP placed in cath lab / IABP dc'ed     POD - Mitral Clip x1, TRACH 7 Merari XLT prox with ENT    MI     Acute Pancreatitis     ARDS     Hemodynamic lability,  instability. Requires IVCD [ ] vasopressors [ ]  inotropes  [ ] vasodilator  [x]IVSS fluid  to maintain MAP, perfusion, C.I.     Post Arrest Shock state - resolving    Respiratory Failure - Tracheostomy     Requires chest PT, pulmonary toilet, ambu bagging, suctioning to maintain SaO2,  patent airway and treat atelectasis.     Ventilator Management:  [x ]AC-rest    [ x]CPAP-PS Wean    [x]Trach Collar     [ ]Extubate    [ ] T-Piece  [ ]peep>5     Difficult weaning process - multiple organ system involvement in critically ill patient     CHF- acute [ x]   chronic [  ]    systolic [ x]   diatolic [ ]          - Echo- EF -   30-40% Severe segmental LV systolic dysfunction          [ ] RV dysfunction     - Cxr-cardiomegally, edema          - Clinical-  [ ]inotropes   [ ]pressors  [ ]diuresis   [ ]IABP      [ ]LVAD   [x]Respiratory Failure.     Cardiogenic Shock - resolving    [ x] Hemodialysis yesterday / today  [ ] Hemofiltration:    [x ] negative fluid balance [ ] even fluid balance [ ] positive fluid balance, in a hemodynamically unstable patient.     Thrombocytopenia     Hypotension     DM- ISS    Requires bedside physical therapy, mobilization and total care home care.     Tolerates NG / NJ feeds at [x] goal rate  [ ] trophic rate    [ ]       rate     fevers     UA c/w infection             By signing my name below, I, Rosio Alonzo, attest that this documentation has been prepared under the direction and in the presence of Finn Zelaya MD.   Electronically Signed: Brian Burnett -27-22 @ 06:58    I, Finn Zelaya, personally performed the services described in this documentation. All medical record entries made by the scribe were at my direction and in my presence. I have reviewed the chart and agree that the record reflects my personal performance and is accurate and complete.   Finn Zelaya MD.       Discussed with CT surgeon, Physician Assistant - Nurse Practitioner- Critical care medicine team.   Discussed at  AM / PM rounds.   Chart, labs , films reviewed.    Cumulative Critical Care Time Given Today:  40 min CRITICAL CARE ATTENDING - CTICU    MEDICATIONS  (STANDING):  aMIOdarone    Tablet 200 milliGRAM(s) Oral daily  aspirin  chewable 81 milliGRAM(s) Oral daily  atorvastatin 80 milliGRAM(s) Oral at bedtime  chlorhexidine 0.12% Liquid 15 milliLiter(s) Oral Mucosa every 12 hours  chlorhexidine 2% Cloths 1 Application(s) Topical <User Schedule>  heparin   Injectable 5000 Unit(s) SubCutaneous every 8 hours  insulin lispro (ADMELOG) corrective regimen sliding scale   SubCutaneous every 6 hours  meropenem  IVPB 500 milliGRAM(s) IV Intermittent every 24 hours  multivitamin/minerals/iron Oral Solution (CENTRUM) 15 milliLiter(s) Oral daily  pantoprazole  Injectable 40 milliGRAM(s) IV Push daily  senna 2 Tablet(s) Oral at bedtime  thiamine 100 milliGRAM(s) Enteral Tube daily                            7.2    15.06 )-----------( 109      ( 27 Dec 2022 00:29 )             23.1           136  |  94<L>  |  75<H>  ----------------------------<  226<H>  4.5   |  26  |  4.64<H>    Ca    7.7<L>      27 Dec 2022 00:28  Phos  5.5       Mg     2.6         TPro  6.4  /  Alb  2.9<L>  /  TBili  0.7  /  DBili  x   /  AST  28  /  ALT  17  /  AlkPhos  84            Mode: standby      Daily     Daily Weight in k.4 (27 Dec 2022 00:00)       @ :  -   @ 07:00  --------------------------------------------------------  IN: 1815 mL / OUT: 1825 mL / NET: -10 mL     @ 07:  -   @ 06:58  --------------------------------------------------------  IN: 2630 mL / OUT: 945 mL / NET: 1685 mL      Critically Ill patient  : [ ] preoperative , [x] Non Operative    Requires :  [x ] Arterial Line   [ x] Central Line  [ ] PA catheter  [ ] IABP [ ] ECMO  [ ] LVAD  [x ] Ventilator  [ ] pacemaker [x] Trach  [x] HD                      [x ] ABG's     [x ] Pulse Oxymetry Monitoring  Bedside evaluation , monitoring , treatment of hemodynamics , fluids , IVP/ IVCD meds.      Diagnosis:      - [facility] - arrest in [facility]     ECMO Decannulation      POD - IABP placed in cath lab / IABP dc'ed     POD - Mitral Clip x1, TRACH 7 Merari XLT prox with ENT    MI     Acute Pancreatitis     ARDS     Hemodynamic lability,  instability. Requires IVCD [ ] vasopressors [ ]  inotropes  [ ] vasodilator  [x]IVSS fluid  to maintain MAP, perfusion, C.I.     Post Arrest Shock state - resolving    Respiratory Failure - Tracheostomy     Requires chest PT, pulmonary toilet, ambu bagging, suctioning to maintain SaO2,  patent airway and treat atelectasis.     Ventilator Management:  [x ]AC-rest    [ x]CPAP-PS Wean    [x]Trach Collar     [ ]Extubate    [ ] T-Piece  [ ]peep>5     Difficult weaning process - multiple organ system involvement in critically ill patient     CHF- acute [ x]   chronic [  ]    systolic [ x]   diatolic [ ]          - Echo- EF -   30-40% Severe segmental LV systolic dysfunction          [ ] RV dysfunction     - Cxr-cardiomegally, edema          - Clinical-  [ ]inotropes   [ ]pressors  [ ]diuresis   [ ]IABP      [ ]LVAD   [x]Respiratory Failure.     Cardiogenic Shock - resolving    [ x] Hemodialysis yesterday / today  [ ] Hemofiltration:    [x ] negative fluid balance [ ] even fluid balance [ ] positive fluid balance, in a hemodynamically unstable patient.     Thrombocytopenia     Hypotension     DM- ISS    Requires bedside physical therapy, mobilization and total nursing home care.     Tolerates NG / NJ feeds at [x] goal rate  [ ] trophic rate    [ ]       rate     fevers     UA c/w infection             By signing my name below, I, Rosio Alonzo, attest that this documentation has been prepared under the direction and in the presence of Finn Zelaya MD.   Electronically Signed: Brian Burnett -27-22 @ 06:58    I, Finn Zelaya, personally performed the services described in this documentation. All medical record entries made by the scribe were at my direction and in my presence. I have reviewed the chart and agree that the record reflects my personal performance and is accurate and complete.   Finn Zelaya MD.       Discussed with CT surgeon, Physician Assistant - Nurse Practitioner- Critical care medicine team.   Discussed at  AM / PM rounds.   Chart, labs , films reviewed.    Cumulative Critical Care Time Given Today:  40 min

## 2022-12-27 NOTE — PROGRESS NOTE ADULT - ATTENDING COMMENTS
ERIC/ATN  Required CRRT, now transitioned to intermittent hemodialysis  Remains edematous    Ok to use diuretics for volume management - currently non-oliguric  Hemodialysis tomorrow for volume management and clearance  Dose meds for HD/eGFR<10  Maintain MAP >65  Remainder per fellow, will follow

## 2022-12-27 NOTE — PROGRESS NOTE ADULT - PROBLEM SELECTOR PLAN 2
Hgb below goal, Anemia workup  Check iron studies    If you have any questions, please feel free to contact me  Arsalan Cochran  Nephrology Fellow  [phone]; Prefer [employer] TEAMS  (After 5pm or on weekends please page the on-call fellow) Hgb below goal, Anemia workup  Check iron studies    If you have any questions, please feel free to contact me  Arsalan Cochran  Nephrology Fellow  [phone]; Prefer [employer] TEAMS  (After 5pm or on weekends please page the on-call fellow) Hgb below goal, Anemia workup  Check iron studies    If you have any questions, please feel free to contact me  Arsalan Cochran  Nephrology Fellow  [phone]; Prefer [employer] TEAMS  (After 5pm or on weekends please page the on-call fellow)

## 2022-12-27 NOTE — PROGRESS NOTE ADULT - PROBLEM SELECTOR PLAN 1
Pt with non oliguric ERIC/ ATN in the setting of STEMI, cardiac arrest & cardiogenic shock  SCr on arrival was 2.15; trended down to hector 1.6 on 11/25; slowly trended up & increased to 3.95 11/28.   Pt initiated on CRRT/CVVHDF to optimize volume and electrolytes on 12/5/22. Pt likely with ATN. Pt underwent decannulation of ECMO on 12/8/22 and was taken off CRRT. Pt reinitiated on CRRT overnight on 12/9/22 for volume overload. s/p trach on 12/16. CRRT stopped again 12/19. Bladder scan daily. Now with De Dios. Off Lasix gtt No plan for HD today. Will likely need tunneled catheter placed for ongoing dialysis needs once cleared from infectious standpoint. Monitor labs and urine output. Avoid any potential nephrotoxins. Dose medications as per HD.

## 2022-12-27 NOTE — PROGRESS NOTE ADULT - SUBJECTIVE AND OBJECTIVE BOX
Patient seen and examined at the bedside.    Remained critically ill on continuous ICU monitoring.    OBJECTIVE:  Vital Signs Last 24 Hrs  T(C): 37.6 (27 Dec 2022 20:00), Max: 37.7 (27 Dec 2022 00:00)  T(F): 99.6 (27 Dec 2022 20:00), Max: 99.9 (27 Dec 2022 00:00)  HR: 113 (27 Dec 2022 20:00) (99 - 114)  BP: --  BP(mean): --  RR: 29 (27 Dec 2022 20:00) (9 - 34)  SpO2: 98% (27 Dec 2022 20:00) (95% - 100%)    Parameters below as of 27 Dec 2022 20:00  Patient On (Oxygen Delivery Method): tracheostomy collar    O2 Concentration (%): 40      Physical Exam:   General: Intubated, multiple lines gtt  Neurology: Nonfocal  Eyes: bilateral pupils equal and reactive   ENT/Neck: +trach, Neck supple, trachea midline, No JVD   Respiratory: rhonchi bilaterally   CV: S1S2, no murmurs        [x] Sinus Rhythm  Abdominal: Softly distended,+BS   Extremities: 1+ pedal edema noted, + peripheral pulses palpable, warm  Skin: No Rashes, Hematoma, Ecchymosis                           Assessment:  63 y/o male with PMH of CABG, DM, HTN, HLD presenting as transfer from [place] with STEMI.     Cardiogenic shock s/p VA ECMO decannulated 12/8  Mitral regurgitation s/p Mitral clip x1 12/16/22  Acute respiratory distress/failure s/p Tracheostomy 12/16/22   IABP placed ( IABP dc'ed 12/17)   At high risk for hemodynamic instability  ERIC/Renal failure  Thrombocytopenia  Anemia        Plan:   ***Neuro***  CT head showed 4mm subdural hematoma 11/26: repeat CT head unchanged 12/01  Soft palate laceration, ENT scoped 12/8, stable, no acute intervention at this time  Repeat Head CT 12/13 Similar minimal increased density along the right aspect of the posterior   falx and right tentorial leaf, consistent with minimal residual subdural   hemorrhage.  No parenchymal hemorrhage or brain edema.  [x] Nonfocal  OOBTC    ***Cardiovascular***  Invasive hemodynamic monitoring, assess perfusion indices   ST / MAP 76/ Hct 23.1/ Lactate 1.2  Volume:[x] PRBC 1U given today   Reassessment of hemodynamics post resuscitation   Amiodarone for rate control   [x] VTE prophylaxis with SubQ Heparin   [x] ASA [x] Statin   Serial EKG and cardiac enzymes       ***Pulmonary***  Trach Collar since 5 AM today  Post op vent management   Titration of FiO2 and PEEP, follow SpO2, CXR, blood gasses     Mode: standby,trach collar              ***GI***  [x] Diet: Tolerating TF Glucerna 1.2, @ goal 65cc/hr.  [x] Protonix   Bowel regimen with senna     ***Renal***   [x] ERIC on CRRT   Continue to monitor I/Os, BUN/Creatinine.   Replete lytes PRN  De Dios present       ***ID***  Meropenem for Empiric dosing  Caspofungin for fungemia      ***Endocrine***  [x]  DM2: HbA1c 7.3%                - [x] Insulin gtt               - Need tight glycemic control to prevent wound infection.        Patient requires continuous monitoring with bedside rhythm monitoring, pulse oximetry monitoring, and continuous central venous and arterial pressure monitoring; and intermittent blood gas analysis. Care plan discussed with the ICU care team.   Patient remained critical, at risk for life threatening decompensation.    I have spent 45 minutes providing critical care management to this patient.    By signing my name below, I, Abhijit Ngo, attest that this documentation has been prepared under the direction and in the presence of Rina Perez NP  Electronically signed: Brian Slaughter, 12-27-22 @ 20:59    I, Rina Perez NP, personally performed the services described in this documentation. all medical record entries made by the scribe were at my direction and in my presence. I have reviewed the chart and agree that the record reflects my personal performance and is accurate and complete  Electronically signed: Rina Perez NP  Patient seen and examined at the bedside.    Remained critically ill on continuous ICU monitoring.    OBJECTIVE:  Vital Signs Last 24 Hrs  T(C): 37.6 (27 Dec 2022 20:00), Max: 37.7 (27 Dec 2022 00:00)  T(F): 99.6 (27 Dec 2022 20:00), Max: 99.9 (27 Dec 2022 00:00)  HR: 113 (27 Dec 2022 20:00) (99 - 114)  BP: --  BP(mean): --  RR: 29 (27 Dec 2022 20:00) (9 - 34)  SpO2: 98% (27 Dec 2022 20:00) (95% - 100%)    Parameters below as of 27 Dec 2022 20:00  Patient On (Oxygen Delivery Method): tracheostomy collar    O2 Concentration (%): 40      Physical Exam:   General: Intubated, multiple lines gtt  Neurology: Nonfocal  Eyes: bilateral pupils equal and reactive   ENT/Neck: +trach, Neck supple, trachea midline, No JVD   Respiratory: rhonchi bilaterally   CV: S1S2, no murmurs        [x] Sinus Rhythm  Abdominal: Softly distended,+BS   Extremities: 1+ pedal edema noted, + peripheral pulses palpable, warm  Skin: No Rashes, Hematoma, Ecchymosis                           Assessment:  61 y/o male with PMH of CABG, DM, HTN, HLD presenting as transfer from [place] with STEMI.     Cardiogenic shock s/p VA ECMO decannulated 12/8  Mitral regurgitation s/p Mitral clip x1 12/16/22  Acute respiratory distress/failure s/p Tracheostomy 12/16/22   IABP placed ( IABP dc'ed 12/17)   At high risk for hemodynamic instability  ERIC/Renal failure  Thrombocytopenia  Anemia        Plan:   ***Neuro***  CT head showed 4mm subdural hematoma 11/26: repeat CT head unchanged 12/01  Soft palate laceration, ENT scoped 12/8, stable, no acute intervention at this time  Repeat Head CT 12/13 Similar minimal increased density along the right aspect of the posterior   falx and right tentorial leaf, consistent with minimal residual subdural   hemorrhage.  No parenchymal hemorrhage or brain edema.  [x] Nonfocal  OOBTC    ***Cardiovascular***  Invasive hemodynamic monitoring, assess perfusion indices   ST / MAP 76/ Hct 23.1/ Lactate 1.2  Volume:[x] PRBC 1U given today   Reassessment of hemodynamics post resuscitation   Amiodarone for rate control   [x] VTE prophylaxis with SubQ Heparin   [x] ASA [x] Statin   Serial EKG and cardiac enzymes       ***Pulmonary***  Trach Collar since 5 AM today  Post op vent management   Titration of FiO2 and PEEP, follow SpO2, CXR, blood gasses     Mode: standby,trach collar              ***GI***  [x] Diet: Tolerating TF Glucerna 1.2, @ goal 65cc/hr.  [x] Protonix   Bowel regimen with senna     ***Renal***   [x] ERIC on CRRT   Continue to monitor I/Os, BUN/Creatinine.   Replete lytes PRN  De Dios present       ***ID***  Meropenem for Empiric dosing  Caspofungin for fungemia      ***Endocrine***  [x]  DM2: HbA1c 7.3%                - [x] Insulin gtt               - Need tight glycemic control to prevent wound infection.        Patient requires continuous monitoring with bedside rhythm monitoring, pulse oximetry monitoring, and continuous central venous and arterial pressure monitoring; and intermittent blood gas analysis. Care plan discussed with the ICU care team.   Patient remained critical, at risk for life threatening decompensation.    I have spent 45 minutes providing critical care management to this patient.    By signing my name below, I, Abhijit Ngo, attest that this documentation has been prepared under the direction and in the presence of Rina Perez NP  Electronically signed: Brian Slaughter, 12-27-22 @ 20:59    I, Rina Perez NP, personally performed the services described in this documentation. all medical record entries made by the scribe were at my direction and in my presence. I have reviewed the chart and agree that the record reflects my personal performance and is accurate and complete  Electronically signed: Rina Perez NP  Patient seen and examined at the bedside.    Remained critically ill on continuous ICU monitoring.    OBJECTIVE:  Vital Signs Last 24 Hrs  T(C): 37.6 (27 Dec 2022 20:00), Max: 37.7 (27 Dec 2022 00:00)  T(F): 99.6 (27 Dec 2022 20:00), Max: 99.9 (27 Dec 2022 00:00)  HR: 113 (27 Dec 2022 20:00) (99 - 114)  BP: --  BP(mean): --  RR: 29 (27 Dec 2022 20:00) (9 - 34)  SpO2: 98% (27 Dec 2022 20:00) (95% - 100%)    Parameters below as of 27 Dec 2022 20:00  Patient On (Oxygen Delivery Method): tracheostomy collar    O2 Concentration (%): 40      Physical Exam:   General: Intubated, multiple lines gtt  Neurology: Nonfocal  Eyes: bilateral pupils equal and reactive   ENT/Neck: +trach, Neck supple, trachea midline, No JVD   Respiratory: rhonchi bilaterally   CV: S1S2, no murmurs        [x] Sinus Rhythm  Abdominal: Softly distended,+BS   Extremities: 1+ pedal edema noted, + peripheral pulses palpable, warm  Skin: No Rashes, Hematoma, Ecchymosis                           Assessment:  63 y/o male with PMH of CABG, DM, HTN, HLD presenting as transfer from [place] with STEMI.     Cardiogenic shock s/p VA ECMO decannulated 12/8  Mitral regurgitation s/p Mitral clip x1 12/16/22  Acute respiratory distress/failure s/p Tracheostomy 12/16/22   IABP placed ( IABP dc'ed 12/17)   At high risk for hemodynamic instability  ERIC/Renal failure  Thrombocytopenia  Anemia        Plan:   ***Neuro***  CT head showed 4mm subdural hematoma 11/26: repeat CT head unchanged 12/01  Soft palate laceration, ENT scoped 12/8, stable, no acute intervention at this time  Repeat Head CT 12/13 Similar minimal increased density along the right aspect of the posterior   falx and right tentorial leaf, consistent with minimal residual subdural   hemorrhage.  No parenchymal hemorrhage or brain edema.  [x] Nonfocal  OOBTC    ***Cardiovascular***  Invasive hemodynamic monitoring, assess perfusion indices   ST / MAP 76/ Hct 23.1/ Lactate 1.2  Volume:[x] PRBC 1U given today   Reassessment of hemodynamics post resuscitation   Amiodarone for rate control   [x] VTE prophylaxis with SubQ Heparin   [x] ASA [x] Statin   Serial EKG and cardiac enzymes       ***Pulmonary***  Trach Collar since 5 AM today  Post op vent management   Titration of FiO2 and PEEP, follow SpO2, CXR, blood gasses     Mode: standby,trach collar              ***GI***  [x] Diet: Tolerating TF Glucerna 1.2, @ goal 65cc/hr.  [x] Protonix   Bowel regimen with senna     ***Renal***   [x] ERIC on CRRT   Continue to monitor I/Os, BUN/Creatinine.   Replete lytes PRN  De Dios present       ***ID***  Meropenem for Empiric dosing  Caspofungin for fungemia      ***Endocrine***  [x]  DM2: HbA1c 7.3%                - [x] Insulin gtt               - Need tight glycemic control to prevent wound infection.        Patient requires continuous monitoring with bedside rhythm monitoring, pulse oximetry monitoring, and continuous central venous and arterial pressure monitoring; and intermittent blood gas analysis. Care plan discussed with the ICU care team.   Patient remained critical, at risk for life threatening decompensation.    I have spent 45 minutes providing critical care management to this patient.    By signing my name below, I, Abhijit Ngo, attest that this documentation has been prepared under the direction and in the presence of Rina Perez NP  Electronically signed: Brian Slaughter, 12-27-22 @ 20:59    I, Rina Perez NP, personally performed the services described in this documentation. all medical record entries made by the scribe were at my direction and in my presence. I have reviewed the chart and agree that the record reflects my personal performance and is accurate and complete  Electronically signed: Rina Perez NP

## 2022-12-27 NOTE — PROGRESS NOTE ADULT - SUBJECTIVE AND OBJECTIVE BOX
[facility] DIVISION OF KIDNEY DISEASES AND HYPERTENSION -- FOLLOW UP NOTE  --------------------------------------------------------------------------------  Chief Complaint:Acute pancreatitis without infection or necrosis    HPI:  61 y/o male with PMH of CABG, DM, HTN, HLD presenting as transfer from [place] for c/f STEMI. Course c/b gallstone pancreatitis leading to ARDS and shock & cardiac arrest now on [facility]. Had significant troponin elevation and his LVEF down to 8%. Pt was deemed to be too unstable to have an angiogram and potential PCI due to his co-morbidities & a new subdural bleed. Pt being seen for ERIC. SCr on arrival was 2.15; trended down to hector 1.6 on 11/25 and eventually increased to 3.95 11/28. Pt received IV diuretics as per CTU. Pt initiated on CRRT on 12/5/22 to optimize volume status and electrolytes.  Pt underwent decannulation of ECMO on 12/8/22. Pt was restarted on 12/9/22 for volume overload. Pt underwent mitral clip OR (12/16/22).  s/p trach on 12/16     HPI: Pt. seen this AM. Tolerated HD yesterday after L Femoral catheter placed. Good UOP but still net+    PAST HISTORY  --------------------------------------------------------------------------------  No significant changes to PMH, PSH, FHx, SHx, unless otherwise noted    ALLERGIES & MEDICATIONS  --------------------------------------------------------------------------------  Allergies    No Known Allergies    Intolerances      Standing Inpatient Medications  aMIOdarone    Tablet 200 milliGRAM(s) Oral daily  aspirin  chewable 81 milliGRAM(s) Oral daily  atorvastatin 80 milliGRAM(s) Oral at bedtime  chlorhexidine 0.12% Liquid 15 milliLiter(s) Oral Mucosa every 12 hours  chlorhexidine 2% Cloths 1 Application(s) Topical <User Schedule>  dextrose 50% Injectable 50 milliLiter(s) IV Push every 15 minutes  heparin   Injectable 5000 Unit(s) SubCutaneous every 8 hours  insulin regular Infusion 2 Unit(s)/Hr IV Continuous <Continuous>  meropenem  IVPB 500 milliGRAM(s) IV Intermittent every 24 hours  multivitamin/minerals/iron Oral Solution (CENTRUM) 15 milliLiter(s) Oral daily  pantoprazole  Injectable 40 milliGRAM(s) IV Push daily  senna 2 Tablet(s) Oral at bedtime  thiamine 100 milliGRAM(s) Enteral Tube daily    PRN Inpatient Medications  acetaminophen    Suspension .. 650 milliGRAM(s) Oral every 6 hours PRN  sodium chloride 0.9% lock flush 10 milliLiter(s) IV Push every 1 hour PRN      REVIEW OF SYSTEMS  --------------------------------------------------------------------------------  Unable to obtain    VITALS/PHYSICAL EXAM  --------------------------------------------------------------------------------  T(C): 36.1 (12-27-22 @ 08:00), Max: 38.3 (12-26-22 @ 17:10)  HR: 107 (12-27-22 @ 09:00) (102 - 118)  BP: --  RR: 26 (12-27-22 @ 09:00) (9 - 35)  SpO2: 100% (12-27-22 @ 09:00) (97% - 100%)  Wt(kg): --        12-26-22 @ 07:01  -  12-27-22 @ 07:00  --------------------------------------------------------  IN: 2630 mL / OUT: 945 mL / NET: 1685 mL    12-27-22 @ 07:01  -  12-27-22 @ 09:11  --------------------------------------------------------  IN: 185 mL / OUT: 55 mL / NET: 130 mL        Physical Exam:  	Gen: ill appearing male  	HEENT: Anicteric  	Pulm:  trach+  	CV: S1S2+  	Abd: Soft, +BS       	Ext: trace LE edema B/L  	Neuro: Awake  	Skin: Warm and dry  	Vascular access: L. Femoral catheter      LABS/STUDIES  --------------------------------------------------------------------------------              7.2    15.06 >-----------<  109      [12-27-22 @ 00:29]              23.1     136  |  94  |  75  ----------------------------<  226      [12-27-22 @ 00:28]  4.5   |  26  |  4.64        Ca     7.7     [12-27-22 @ 00:28]      Mg     2.6     [12-27-22 @ 00:28]      Phos  5.5     [12-27-22 @ 00:28]    TPro  6.4  /  Alb  2.9  /  TBili  0.7  /  DBili  x   /  AST  28  /  ALT  17  /  AlkPhos  84  [12-27-22 @ 00:28]      Creatinine Trend:  SCr 4.64 [12-27 @ 00:28]  SCr 5.35 [12-26 @ 00:25]  SCr 5.19 [12-25 @ 15:24]  SCr 4.59 [12-25 @ 00:28]  SCr 3.22 [12-24 @ 00:43]    Urinalysis - [12-26-22 @ 13:19]      Color Yellow / Appearance Clear / SG 1.013 / pH 6.0      Gluc Negative / Ketone Negative  / Bili Negative / Urobili Negative       Blood Large / Protein 100 mg/dL / Leuk Est Moderate / Nitrite Negative      RBC >50 / WBC 35 / Hyaline 1 / Gran  / Sq Epi  / Non Sq Epi 2 / Bacteria Few      TSH 2.84      [12-10-22 @ 15:55]  Lipid: chol --, , HDL --, LDL --      [12-05-22 @ 00:50]    HBsAb 889.7      [12-20-22 @ 11:25]  HBcAb Reactive      [12-20-22 @ 11:25]  HCV 0.15, Nonreact      [12-20-22 @ 11:25]     [facility] DIVISION OF KIDNEY DISEASES AND HYPERTENSION -- FOLLOW UP NOTE  --------------------------------------------------------------------------------  Chief Complaint:Acute pancreatitis without infection or necrosis    HPI:  61 y/o male with PMH of CABG, DM, HTN, HLD presenting as transfer from [place] for c/f STEMI. Course c/b gallstone pancreatitis leading to ARDS and shock & cardiac arrest now on [facility]. Had significant troponin elevation and his LVEF down to 8%. Pt was deemed to be too unstable to have an angiogram and potential PCI due to his co-morbidities & a new subdural bleed. Pt being seen for ERIC. SCr on arrival was 2.15; trended down to hector 1.6 on 11/25 and eventually increased to 3.95 11/28. Pt received IV diuretics as per CTU. Pt initiated on CRRT on 12/5/22 to optimize volume status and electrolytes.  Pt underwent decannulation of ECMO on 12/8/22. Pt was restarted on 12/9/22 for volume overload. Pt underwent mitral clip OR (12/16/22).  s/p trach on 12/16     HPI: Pt. seen this AM. Tolerated HD yesterday after L Femoral catheter placed. Good UOP but still net+    PAST HISTORY  --------------------------------------------------------------------------------  No significant changes to PMH, PSH, FHx, SHx, unless otherwise noted    ALLERGIES & MEDICATIONS  --------------------------------------------------------------------------------  Allergies    No Known Allergies    Intolerances      Standing Inpatient Medications  aMIOdarone    Tablet 200 milliGRAM(s) Oral daily  aspirin  chewable 81 milliGRAM(s) Oral daily  atorvastatin 80 milliGRAM(s) Oral at bedtime  chlorhexidine 0.12% Liquid 15 milliLiter(s) Oral Mucosa every 12 hours  chlorhexidine 2% Cloths 1 Application(s) Topical <User Schedule>  dextrose 50% Injectable 50 milliLiter(s) IV Push every 15 minutes  heparin   Injectable 5000 Unit(s) SubCutaneous every 8 hours  insulin regular Infusion 2 Unit(s)/Hr IV Continuous <Continuous>  meropenem  IVPB 500 milliGRAM(s) IV Intermittent every 24 hours  multivitamin/minerals/iron Oral Solution (CENTRUM) 15 milliLiter(s) Oral daily  pantoprazole  Injectable 40 milliGRAM(s) IV Push daily  senna 2 Tablet(s) Oral at bedtime  thiamine 100 milliGRAM(s) Enteral Tube daily    PRN Inpatient Medications  acetaminophen    Suspension .. 650 milliGRAM(s) Oral every 6 hours PRN  sodium chloride 0.9% lock flush 10 milliLiter(s) IV Push every 1 hour PRN      REVIEW OF SYSTEMS  --------------------------------------------------------------------------------  Unable to obtain    VITALS/PHYSICAL EXAM  --------------------------------------------------------------------------------  T(C): 36.1 (12-27-22 @ 08:00), Max: 38.3 (12-26-22 @ 17:10)  HR: 107 (12-27-22 @ 09:00) (102 - 118)  BP: --  RR: 26 (12-27-22 @ 09:00) (9 - 35)  SpO2: 100% (12-27-22 @ 09:00) (97% - 100%)  Wt(kg): --        12-26-22 @ 07:01  -  12-27-22 @ 07:00  --------------------------------------------------------  IN: 2630 mL / OUT: 945 mL / NET: 1685 mL    12-27-22 @ 07:01  -  12-27-22 @ 09:11  --------------------------------------------------------  IN: 185 mL / OUT: 55 mL / NET: 130 mL        Physical Exam:  	Gen: ill appearing male  	HEENT: Anicteric  	Pulm:  trach+  	CV: S1S2+  	Abd: Soft, +BS       	Ext: trace LE edema B/L  	Neuro: Awake  	Skin: Warm and dry  	Vascular access: L. Femoral catheter      LABS/STUDIES  --------------------------------------------------------------------------------              7.2    15.06 >-----------<  109      [12-27-22 @ 00:29]              23.1     136  |  94  |  75  ----------------------------<  226      [12-27-22 @ 00:28]  4.5   |  26  |  4.64        Ca     7.7     [12-27-22 @ 00:28]      Mg     2.6     [12-27-22 @ 00:28]      Phos  5.5     [12-27-22 @ 00:28]    TPro  6.4  /  Alb  2.9  /  TBili  0.7  /  DBili  x   /  AST  28  /  ALT  17  /  AlkPhos  84  [12-27-22 @ 00:28]      Creatinine Trend:  SCr 4.64 [12-27 @ 00:28]  SCr 5.35 [12-26 @ 00:25]  SCr 5.19 [12-25 @ 15:24]  SCr 4.59 [12-25 @ 00:28]  SCr 3.22 [12-24 @ 00:43]    Urinalysis - [12-26-22 @ 13:19]      Color Yellow / Appearance Clear / SG 1.013 / pH 6.0      Gluc Negative / Ketone Negative  / Bili Negative / Urobili Negative       Blood Large / Protein 100 mg/dL / Leuk Est Moderate / Nitrite Negative      RBC >50 / WBC 35 / Hyaline 1 / Gran  / Sq Epi  / Non Sq Epi 2 / Bacteria Few      TSH 2.84      [12-10-22 @ 15:55]  Lipid: chol --, , HDL --, LDL --      [12-05-22 @ 00:50]    HBsAb 889.7      [12-20-22 @ 11:25]  HBcAb Reactive      [12-20-22 @ 11:25]  HCV 0.15, Nonreact      [12-20-22 @ 11:25]     [facility] DIVISION OF KIDNEY DISEASES AND HYPERTENSION -- FOLLOW UP NOTE  --------------------------------------------------------------------------------  Chief Complaint:Acute pancreatitis without infection or necrosis    HPI:  61 y/o male with PMH of CABG, DM, HTN, HLD presenting as transfer from [place] for c/f STEMI. Course c/b gallstone pancreatitis leading to ARDS and shock & cardiac arrest now on [facility]. Had significant troponin elevation and his LVEF down to 8%. Pt was deemed to be too unstable to have an angiogram and potential PCI due to his co-morbidities & a new subdural bleed. Pt being seen for ERIC. SCr on arrival was 2.15; trended down to hector 1.6 on 11/25 and eventually increased to 3.95 11/28. Pt received IV diuretics as per CTU. Pt initiated on CRRT on 12/5/22 to optimize volume status and electrolytes.  Pt underwent decannulation of ECMO on 12/8/22. Pt was restarted on 12/9/22 for volume overload. Pt underwent mitral clip OR (12/16/22).  s/p trach on 12/16     HPI: Pt. seen this AM. Tolerated HD yesterday after L Femoral catheter placed. Good UOP but still net+    PAST HISTORY  --------------------------------------------------------------------------------  No significant changes to PMH, PSH, FHx, SHx, unless otherwise noted    ALLERGIES & MEDICATIONS  --------------------------------------------------------------------------------  Allergies    No Known Allergies    Intolerances      Standing Inpatient Medications  aMIOdarone    Tablet 200 milliGRAM(s) Oral daily  aspirin  chewable 81 milliGRAM(s) Oral daily  atorvastatin 80 milliGRAM(s) Oral at bedtime  chlorhexidine 0.12% Liquid 15 milliLiter(s) Oral Mucosa every 12 hours  chlorhexidine 2% Cloths 1 Application(s) Topical <User Schedule>  dextrose 50% Injectable 50 milliLiter(s) IV Push every 15 minutes  heparin   Injectable 5000 Unit(s) SubCutaneous every 8 hours  insulin regular Infusion 2 Unit(s)/Hr IV Continuous <Continuous>  meropenem  IVPB 500 milliGRAM(s) IV Intermittent every 24 hours  multivitamin/minerals/iron Oral Solution (CENTRUM) 15 milliLiter(s) Oral daily  pantoprazole  Injectable 40 milliGRAM(s) IV Push daily  senna 2 Tablet(s) Oral at bedtime  thiamine 100 milliGRAM(s) Enteral Tube daily    PRN Inpatient Medications  acetaminophen    Suspension .. 650 milliGRAM(s) Oral every 6 hours PRN  sodium chloride 0.9% lock flush 10 milliLiter(s) IV Push every 1 hour PRN      REVIEW OF SYSTEMS  --------------------------------------------------------------------------------  Unable to obtain    VITALS/PHYSICAL EXAM  --------------------------------------------------------------------------------  T(C): 36.1 (12-27-22 @ 08:00), Max: 38.3 (12-26-22 @ 17:10)  HR: 107 (12-27-22 @ 09:00) (102 - 118)  BP: --  RR: 26 (12-27-22 @ 09:00) (9 - 35)  SpO2: 100% (12-27-22 @ 09:00) (97% - 100%)  Wt(kg): --        12-26-22 @ 07:01  -  12-27-22 @ 07:00  --------------------------------------------------------  IN: 2630 mL / OUT: 945 mL / NET: 1685 mL    12-27-22 @ 07:01  -  12-27-22 @ 09:11  --------------------------------------------------------  IN: 185 mL / OUT: 55 mL / NET: 130 mL        Physical Exam:  	Gen: ill appearing male  	HEENT: Anicteric  	Pulm:  trach+  	CV: S1S2+  	Abd: Soft, +BS       	Ext: trace LE edema B/L  	Neuro: Awake  	Skin: Warm and dry  	Vascular access: L. Femoral catheter      LABS/STUDIES  --------------------------------------------------------------------------------              7.2    15.06 >-----------<  109      [12-27-22 @ 00:29]              23.1     136  |  94  |  75  ----------------------------<  226      [12-27-22 @ 00:28]  4.5   |  26  |  4.64        Ca     7.7     [12-27-22 @ 00:28]      Mg     2.6     [12-27-22 @ 00:28]      Phos  5.5     [12-27-22 @ 00:28]    TPro  6.4  /  Alb  2.9  /  TBili  0.7  /  DBili  x   /  AST  28  /  ALT  17  /  AlkPhos  84  [12-27-22 @ 00:28]      Creatinine Trend:  SCr 4.64 [12-27 @ 00:28]  SCr 5.35 [12-26 @ 00:25]  SCr 5.19 [12-25 @ 15:24]  SCr 4.59 [12-25 @ 00:28]  SCr 3.22 [12-24 @ 00:43]    Urinalysis - [12-26-22 @ 13:19]      Color Yellow / Appearance Clear / SG 1.013 / pH 6.0      Gluc Negative / Ketone Negative  / Bili Negative / Urobili Negative       Blood Large / Protein 100 mg/dL / Leuk Est Moderate / Nitrite Negative      RBC >50 / WBC 35 / Hyaline 1 / Gran  / Sq Epi  / Non Sq Epi 2 / Bacteria Few      TSH 2.84      [12-10-22 @ 15:55]  Lipid: chol --, , HDL --, LDL --      [12-05-22 @ 00:50]    HBsAb 889.7      [12-20-22 @ 11:25]  HBcAb Reactive      [12-20-22 @ 11:25]  HCV 0.15, Nonreact      [12-20-22 @ 11:25]

## 2022-12-28 LAB
ALBUMIN SERPL ELPH-MCNC: 2.9 G/DL — LOW (ref 3.3–5)
ALP SERPL-CCNC: 88 U/L — SIGNIFICANT CHANGE UP (ref 40–120)
ALT FLD-CCNC: 21 U/L — SIGNIFICANT CHANGE UP (ref 10–45)
ANION GAP SERPL CALC-SCNC: 18 MMOL/L — HIGH (ref 5–17)
AST SERPL-CCNC: 26 U/L — SIGNIFICANT CHANGE UP (ref 10–40)
BILIRUB SERPL-MCNC: 0.7 MG/DL — SIGNIFICANT CHANGE UP (ref 0.2–1.2)
BUN SERPL-MCNC: 104 MG/DL — HIGH (ref 7–23)
CALCIUM SERPL-MCNC: 8.1 MG/DL — LOW (ref 8.4–10.5)
CHLORIDE SERPL-SCNC: 96 MMOL/L — SIGNIFICANT CHANGE UP (ref 96–108)
CO2 SERPL-SCNC: 23 MMOL/L — SIGNIFICANT CHANGE UP (ref 22–31)
CREAT SERPL-MCNC: 5.5 MG/DL — HIGH (ref 0.5–1.3)
CULTURE RESULTS: SIGNIFICANT CHANGE UP
EGFR: 11 ML/MIN/1.73M2 — LOW
GLUCOSE BLDC GLUCOMTR-MCNC: 106 MG/DL — HIGH (ref 70–99)
GLUCOSE BLDC GLUCOMTR-MCNC: 108 MG/DL — HIGH (ref 70–99)
GLUCOSE BLDC GLUCOMTR-MCNC: 112 MG/DL — HIGH (ref 70–99)
GLUCOSE BLDC GLUCOMTR-MCNC: 121 MG/DL — HIGH (ref 70–99)
GLUCOSE BLDC GLUCOMTR-MCNC: 125 MG/DL — HIGH (ref 70–99)
GLUCOSE BLDC GLUCOMTR-MCNC: 126 MG/DL — HIGH (ref 70–99)
GLUCOSE BLDC GLUCOMTR-MCNC: 130 MG/DL — HIGH (ref 70–99)
GLUCOSE BLDC GLUCOMTR-MCNC: 131 MG/DL — HIGH (ref 70–99)
GLUCOSE BLDC GLUCOMTR-MCNC: 132 MG/DL — HIGH (ref 70–99)
GLUCOSE BLDC GLUCOMTR-MCNC: 134 MG/DL — HIGH (ref 70–99)
GLUCOSE BLDC GLUCOMTR-MCNC: 137 MG/DL — HIGH (ref 70–99)
GLUCOSE BLDC GLUCOMTR-MCNC: 139 MG/DL — HIGH (ref 70–99)
GLUCOSE BLDC GLUCOMTR-MCNC: 140 MG/DL — HIGH (ref 70–99)
GLUCOSE BLDC GLUCOMTR-MCNC: 141 MG/DL — HIGH (ref 70–99)
GLUCOSE BLDC GLUCOMTR-MCNC: 142 MG/DL — HIGH (ref 70–99)
GLUCOSE BLDC GLUCOMTR-MCNC: 144 MG/DL — HIGH (ref 70–99)
GLUCOSE BLDC GLUCOMTR-MCNC: 147 MG/DL — HIGH (ref 70–99)
GLUCOSE BLDC GLUCOMTR-MCNC: 150 MG/DL — HIGH (ref 70–99)
GLUCOSE BLDC GLUCOMTR-MCNC: 156 MG/DL — HIGH (ref 70–99)
GLUCOSE BLDC GLUCOMTR-MCNC: 98 MG/DL — SIGNIFICANT CHANGE UP (ref 70–99)
GLUCOSE SERPL-MCNC: 147 MG/DL — HIGH (ref 70–99)
HCT VFR BLD CALC: 27.1 % — LOW (ref 39–50)
HGB BLD-MCNC: 8.6 G/DL — LOW (ref 13–17)
MAGNESIUM SERPL-MCNC: 2.8 MG/DL — HIGH (ref 1.6–2.6)
MCHC RBC-ENTMCNC: 29.6 PG — SIGNIFICANT CHANGE UP (ref 27–34)
MCHC RBC-ENTMCNC: 31.7 GM/DL — LOW (ref 32–36)
MCV RBC AUTO: 93.1 FL — SIGNIFICANT CHANGE UP (ref 80–100)
NRBC # BLD: 0 /100 WBCS — SIGNIFICANT CHANGE UP (ref 0–0)
PHOSPHATE SERPL-MCNC: 6.7 MG/DL — HIGH (ref 2.5–4.5)
PLATELET # BLD AUTO: 159 K/UL — SIGNIFICANT CHANGE UP (ref 150–400)
POTASSIUM SERPL-MCNC: 4.8 MMOL/L — SIGNIFICANT CHANGE UP (ref 3.5–5.3)
POTASSIUM SERPL-SCNC: 4.8 MMOL/L — SIGNIFICANT CHANGE UP (ref 3.5–5.3)
PROT SERPL-MCNC: 6.7 G/DL — SIGNIFICANT CHANGE UP (ref 6–8.3)
RBC # BLD: 2.91 M/UL — LOW (ref 4.2–5.8)
RBC # FLD: 18 % — HIGH (ref 10.3–14.5)
SODIUM SERPL-SCNC: 137 MMOL/L — SIGNIFICANT CHANGE UP (ref 135–145)
SPECIMEN SOURCE: SIGNIFICANT CHANGE UP
WBC # BLD: 14.09 K/UL — HIGH (ref 3.8–10.5)
WBC # FLD AUTO: 14.09 K/UL — HIGH (ref 3.8–10.5)

## 2022-12-28 PROCEDURE — 71045 X-RAY EXAM CHEST 1 VIEW: CPT | Mod: 26

## 2022-12-28 PROCEDURE — 99291 CRITICAL CARE FIRST HOUR: CPT

## 2022-12-28 PROCEDURE — 99232 SBSQ HOSP IP/OBS MODERATE 35: CPT

## 2022-12-28 PROCEDURE — 99292 CRITICAL CARE ADDL 30 MIN: CPT | Mod: 24

## 2022-12-28 PROCEDURE — 99233 SBSQ HOSP IP/OBS HIGH 50: CPT | Mod: GC

## 2022-12-28 RX ADMIN — Medication 15 MILLILITER(S): at 11:18

## 2022-12-28 RX ADMIN — CASPOFUNGIN ACETATE 260 MILLIGRAM(S): 7 INJECTION, POWDER, LYOPHILIZED, FOR SOLUTION INTRAVENOUS at 17:43

## 2022-12-28 RX ADMIN — MEROPENEM 100 MILLIGRAM(S): 1 INJECTION INTRAVENOUS at 22:03

## 2022-12-28 RX ADMIN — Medication 81 MILLIGRAM(S): at 11:17

## 2022-12-28 RX ADMIN — SENNA PLUS 2 TABLET(S): 8.6 TABLET ORAL at 22:02

## 2022-12-28 RX ADMIN — PANTOPRAZOLE SODIUM 40 MILLIGRAM(S): 20 TABLET, DELAYED RELEASE ORAL at 11:18

## 2022-12-28 RX ADMIN — HEPARIN SODIUM 5000 UNIT(S): 5000 INJECTION INTRAVENOUS; SUBCUTANEOUS at 09:45

## 2022-12-28 RX ADMIN — ATORVASTATIN CALCIUM 80 MILLIGRAM(S): 80 TABLET, FILM COATED ORAL at 22:02

## 2022-12-28 RX ADMIN — CHLORHEXIDINE GLUCONATE 15 MILLILITER(S): 213 SOLUTION TOPICAL at 17:43

## 2022-12-28 RX ADMIN — CHLORHEXIDINE GLUCONATE 15 MILLILITER(S): 213 SOLUTION TOPICAL at 05:56

## 2022-12-28 RX ADMIN — CHLORHEXIDINE GLUCONATE 1 APPLICATION(S): 213 SOLUTION TOPICAL at 05:56

## 2022-12-28 RX ADMIN — Medication 100 MILLIGRAM(S): at 11:17

## 2022-12-28 RX ADMIN — HEPARIN SODIUM 5000 UNIT(S): 5000 INJECTION INTRAVENOUS; SUBCUTANEOUS at 17:44

## 2022-12-28 RX ADMIN — INSULIN HUMAN 2 UNIT(S)/HR: 100 INJECTION, SOLUTION SUBCUTANEOUS at 22:02

## 2022-12-28 RX ADMIN — HEPARIN SODIUM 5000 UNIT(S): 5000 INJECTION INTRAVENOUS; SUBCUTANEOUS at 01:22

## 2022-12-28 RX ADMIN — AMIODARONE HYDROCHLORIDE 200 MILLIGRAM(S): 400 TABLET ORAL at 05:56

## 2022-12-28 NOTE — PROGRESS NOTE ADULT - SUBJECTIVE AND OBJECTIVE BOX
Patient seen and examined at the bedside.    Remained critically ill on continuous ICU monitoring.     resting on vent overnight for vent wean management protocol.   Patient On (Oxygen Delivery Method): tracheostomy collar    O2 Concentration (%): 40         acetaminophen    Suspension .. 650 milliGRAM(s) Oral every 6 hours PRN  aMIOdarone    Tablet 200 milliGRAM(s) Oral daily  aspirin  chewable 81 milliGRAM(s) Oral daily  atorvastatin 80 milliGRAM(s) Oral at bedtime  caspofungin IVPB 50 milliGRAM(s) IV Intermittent every 24 hours  caspofungin IVPB      chlorhexidine 0.12% Liquid 15 milliLiter(s) Oral Mucosa every 12 hours  chlorhexidine 2% Cloths 1 Application(s) Topical <User Schedule>  dextrose 50% Injectable 50 milliLiter(s) IV Push every 15 minutes  heparin   Injectable 5000 Unit(s) SubCutaneous every 8 hours  insulin regular Infusion 2 Unit(s)/Hr IV Continuous <Continuous>  meropenem  IVPB 500 milliGRAM(s) IV Intermittent <User Schedule>  multivitamin/minerals/iron Oral Solution (CENTRUM) 15 milliLiter(s) Oral daily  pantoprazole  Injectable 40 milliGRAM(s) IV Push daily  senna 2 Tablet(s) Oral at bedtime  sodium chloride 0.9% lock flush 10 milliLiter(s) IV Push every 1 hour PRN  thiamine 100 milliGRAM(s) Enteral Tube daily                            8.6    14.09 )-----------( 159      ( 28 Dec 2022 00:40 )             27.1       Hemoglobin: 8.6 g/dL (12-28 @ 00:40)  Hemoglobin: 7.2 g/dL (12-27 @ 00:29)  Hemoglobin: 8.5 g/dL (12-26 @ 00:25)  Hemoglobin: 8.0 g/dL (12-25 @ 00:28)  Hemoglobin: 8.2 g/dL (12-24 @ 00:43)      12-28    137  |  96  |  104<H>  ----------------------------<  147<H>  4.8   |  23  |  5.50<H>    Ca    8.1<L>      28 Dec 2022 00:40  Phos  6.7     12-28  Mg     2.8     12-28    TPro  6.7  /  Alb  2.9<L>  /  TBili  0.7  /  DBili  x   /  AST  26  /  ALT  21  /  AlkPhos  88  12-28    Creatinine Trend: 5.50<--, 4.64<--, 5.35<--, 5.19<--, 4.59<--, 3.22<--    COAGS:           T(C): 37.1 (12-28-22 @ 20:00), Max: 37.1 (12-28-22 @ 20:00)  HR: 102 (12-28-22 @ 21:08) (101 - 109)  BP: --  RR: 20 (12-28-22 @ 21:00) (14 - 37)  SpO2: 95% (12-28-22 @ 21:08) (94% - 100%)  Wt(kg): --    I&O's Summary    27 Dec 2022 07:01  -  28 Dec 2022 07:00  --------------------------------------------------------  IN: 2112 mL / OUT: 710 mL / NET: 1402 mL    28 Dec 2022 07:01  -  28 Dec 2022 22:12  --------------------------------------------------------  IN: 1644.5 mL / OUT: 2900 mL / NET: -1255.5 mL        Physical Exam:   General: Intubated, multiple lines gtt  Neurology: Nonfocal  Eyes: bilateral pupils equal and reactive   ENT/Neck: +trach, Neck supple, trachea midline, No JVD   Respiratory: rhonchi bilaterally   CV: S1S2, no murmurs        [x] Sinus Rhythm  Abdominal: Softly distended,+BS   Extremities: 1+ pedal edema noted, + peripheral pulses palpable, warm  Skin: No Rashes, Hematoma, Ecchymosis                           Assessment:  63 y/o male with PMH of CABG, DM, HTN, HLD presenting as transfer from [place] with STEMI.     Cardiogenic shock s/p VA ECMO decannulated 12/8  Mitral regurgitation s/p Mitral clip x1 12/16/22  Acute respiratory distress/failure s/p Tracheostomy 12/16/22   IABP placed ( IABP dc'ed 12/17)   At high risk for hemodynamic instability  ERIC/Renal failure  Thrombocytopenia  Anemia        Plan:   ***Neuro***  CT head showed 4mm subdural hematoma 11/26: repeat CT head unchanged 12/01  Soft palate laceration, ENT scoped 12/8, stable, no acute intervention at this time  Repeat Head CT 12/13 Similar minimal increased density along the right aspect of the posterior   falx and right tentorial leaf, consistent with minimal residual subdural   hemorrhage.  No parenchymal hemorrhage or brain edema.  [x] Nonfocal  OOBTC    ***Cardiovascular***  Invasive hemodynamic monitoring, assess perfusion indices         Amiodarone for rate control   [x] VTE prophylaxis with SubQ Heparin   [x] ASA [x] Statin   Serial EKG and cardiac enzymes       ***Pulmonary***  Trach Collar since 5 AM today  Post op vent management   Titration of FiO2 and PEEP, follow SpO2, CXR, blood gasses     Mode: standby,trach collar              ***GI***  [x] Diet: Tolerating TF Glucerna 1.2, @ goal 65cc/hr.  [x] Protonix   Bowel regimen with senna     ***Renal***   [x] ERIC on CRRT   Continue to monitor I/Os, BUN/Creatinine.   Replete lytes PRN  De Dios present       ***ID***  Meropenem for Empiric dosing  Caspofungin for fungemia      ***Endocrine***  [x]  DM2: HbA1c 7.3%                - [x] Insulin gtt               - Need tight glycemic control to prevent wound infection.        Patient requires continuous monitoring with bedside rhythm monitoring, pulse oximetry monitoring, and continuous central venous and arterial pressure monitoring; and intermittent blood gas analysis. Care plan discussed with the ICU care team.   Patient remained critical, at risk for life threatening decompensation.    I have spent 45 minutes providing critical care management to this patient.    Patient seen and examined at the bedside.    Remained critically ill on continuous ICU monitoring.     resting on vent overnight for vent wean management protocol.   Patient On (Oxygen Delivery Method): tracheostomy collar    O2 Concentration (%): 40         acetaminophen    Suspension .. 650 milliGRAM(s) Oral every 6 hours PRN  aMIOdarone    Tablet 200 milliGRAM(s) Oral daily  aspirin  chewable 81 milliGRAM(s) Oral daily  atorvastatin 80 milliGRAM(s) Oral at bedtime  caspofungin IVPB 50 milliGRAM(s) IV Intermittent every 24 hours  caspofungin IVPB      chlorhexidine 0.12% Liquid 15 milliLiter(s) Oral Mucosa every 12 hours  chlorhexidine 2% Cloths 1 Application(s) Topical <User Schedule>  dextrose 50% Injectable 50 milliLiter(s) IV Push every 15 minutes  heparin   Injectable 5000 Unit(s) SubCutaneous every 8 hours  insulin regular Infusion 2 Unit(s)/Hr IV Continuous <Continuous>  meropenem  IVPB 500 milliGRAM(s) IV Intermittent <User Schedule>  multivitamin/minerals/iron Oral Solution (CENTRUM) 15 milliLiter(s) Oral daily  pantoprazole  Injectable 40 milliGRAM(s) IV Push daily  senna 2 Tablet(s) Oral at bedtime  sodium chloride 0.9% lock flush 10 milliLiter(s) IV Push every 1 hour PRN  thiamine 100 milliGRAM(s) Enteral Tube daily                            8.6    14.09 )-----------( 159      ( 28 Dec 2022 00:40 )             27.1       Hemoglobin: 8.6 g/dL (12-28 @ 00:40)  Hemoglobin: 7.2 g/dL (12-27 @ 00:29)  Hemoglobin: 8.5 g/dL (12-26 @ 00:25)  Hemoglobin: 8.0 g/dL (12-25 @ 00:28)  Hemoglobin: 8.2 g/dL (12-24 @ 00:43)      12-28    137  |  96  |  104<H>  ----------------------------<  147<H>  4.8   |  23  |  5.50<H>    Ca    8.1<L>      28 Dec 2022 00:40  Phos  6.7     12-28  Mg     2.8     12-28    TPro  6.7  /  Alb  2.9<L>  /  TBili  0.7  /  DBili  x   /  AST  26  /  ALT  21  /  AlkPhos  88  12-28    Creatinine Trend: 5.50<--, 4.64<--, 5.35<--, 5.19<--, 4.59<--, 3.22<--    COAGS:           T(C): 37.1 (12-28-22 @ 20:00), Max: 37.1 (12-28-22 @ 20:00)  HR: 102 (12-28-22 @ 21:08) (101 - 109)  BP: --  RR: 20 (12-28-22 @ 21:00) (14 - 37)  SpO2: 95% (12-28-22 @ 21:08) (94% - 100%)  Wt(kg): --    I&O's Summary    27 Dec 2022 07:01  -  28 Dec 2022 07:00  --------------------------------------------------------  IN: 2112 mL / OUT: 710 mL / NET: 1402 mL    28 Dec 2022 07:01  -  28 Dec 2022 22:12  --------------------------------------------------------  IN: 1644.5 mL / OUT: 2900 mL / NET: -1255.5 mL        Physical Exam:   General: Intubated, multiple lines gtt  Neurology: Nonfocal  Eyes: bilateral pupils equal and reactive   ENT/Neck: +trach, Neck supple, trachea midline, No JVD   Respiratory: rhonchi bilaterally   CV: S1S2, no murmurs        [x] Sinus Rhythm  Abdominal: Softly distended,+BS   Extremities: 1+ pedal edema noted, + peripheral pulses palpable, warm  Skin: No Rashes, Hematoma, Ecchymosis                           Assessment:  63 y/o male with PMH of CABG, DM, HTN, HLD presenting as transfer from [place] with STEMI.     Cardiogenic shock s/p VA ECMO decannulated 12/8  Mitral regurgitation s/p Mitral clip x1 12/16/22  Acute respiratory distress/failure s/p Tracheostomy 12/16/22   IABP placed ( IABP dc'ed 12/17)   At high risk for hemodynamic instability  ERIC/Renal failure  Thrombocytopenia  Anemia        Plan:   ***Neuro***  CT head showed 4mm subdural hematoma 11/26: repeat CT head unchanged 12/01  Soft palate laceration, ENT scoped 12/8, stable, no acute intervention at this time  Repeat Head CT 12/13 Similar minimal increased density along the right aspect of the posterior   falx and right tentorial leaf, consistent with minimal residual subdural   hemorrhage.  No parenchymal hemorrhage or brain edema.  [x] Nonfocal  OOBTC    ***Cardiovascular***  Invasive hemodynamic monitoring, assess perfusion indices         Amiodarone for rate control   [x] VTE prophylaxis with SubQ Heparin   [x] ASA [x] Statin   Serial EKG and cardiac enzymes       ***Pulmonary***  Trach Collar since 5 AM today  Post op vent management   Titration of FiO2 and PEEP, follow SpO2, CXR, blood gasses     Mode: standby,trach collar              ***GI***  [x] Diet: Tolerating TF Glucerna 1.2, @ goal 65cc/hr.  [x] Protonix   Bowel regimen with senna     ***Renal***   [x] ERIC on CRRT   Continue to monitor I/Os, BUN/Creatinine.   Replete lytes PRN  De Dios present       ***ID***  Meropenem for Empiric dosing  Caspofungin for fungemia      ***Endocrine***  [x]  DM2: HbA1c 7.3%                - [x] Insulin gtt               - Need tight glycemic control to prevent wound infection.        Patient requires continuous monitoring with bedside rhythm monitoring, pulse oximetry monitoring, and continuous central venous and arterial pressure monitoring; and intermittent blood gas analysis. Care plan discussed with the ICU care team.   Patient remained critical, at risk for life threatening decompensation.    I have spent 45 minutes providing critical care management to this patient.    Patient seen and examined at the bedside.    Remained critically ill on continuous ICU monitoring.     resting on vent overnight for vent wean management protocol.   Patient On (Oxygen Delivery Method): tracheostomy collar    O2 Concentration (%): 40         acetaminophen    Suspension .. 650 milliGRAM(s) Oral every 6 hours PRN  aMIOdarone    Tablet 200 milliGRAM(s) Oral daily  aspirin  chewable 81 milliGRAM(s) Oral daily  atorvastatin 80 milliGRAM(s) Oral at bedtime  caspofungin IVPB 50 milliGRAM(s) IV Intermittent every 24 hours  caspofungin IVPB      chlorhexidine 0.12% Liquid 15 milliLiter(s) Oral Mucosa every 12 hours  chlorhexidine 2% Cloths 1 Application(s) Topical <User Schedule>  dextrose 50% Injectable 50 milliLiter(s) IV Push every 15 minutes  heparin   Injectable 5000 Unit(s) SubCutaneous every 8 hours  insulin regular Infusion 2 Unit(s)/Hr IV Continuous <Continuous>  meropenem  IVPB 500 milliGRAM(s) IV Intermittent <User Schedule>  multivitamin/minerals/iron Oral Solution (CENTRUM) 15 milliLiter(s) Oral daily  pantoprazole  Injectable 40 milliGRAM(s) IV Push daily  senna 2 Tablet(s) Oral at bedtime  sodium chloride 0.9% lock flush 10 milliLiter(s) IV Push every 1 hour PRN  thiamine 100 milliGRAM(s) Enteral Tube daily                            8.6    14.09 )-----------( 159      ( 28 Dec 2022 00:40 )             27.1       Hemoglobin: 8.6 g/dL (12-28 @ 00:40)  Hemoglobin: 7.2 g/dL (12-27 @ 00:29)  Hemoglobin: 8.5 g/dL (12-26 @ 00:25)  Hemoglobin: 8.0 g/dL (12-25 @ 00:28)  Hemoglobin: 8.2 g/dL (12-24 @ 00:43)      12-28    137  |  96  |  104<H>  ----------------------------<  147<H>  4.8   |  23  |  5.50<H>    Ca    8.1<L>      28 Dec 2022 00:40  Phos  6.7     12-28  Mg     2.8     12-28    TPro  6.7  /  Alb  2.9<L>  /  TBili  0.7  /  DBili  x   /  AST  26  /  ALT  21  /  AlkPhos  88  12-28    Creatinine Trend: 5.50<--, 4.64<--, 5.35<--, 5.19<--, 4.59<--, 3.22<--    COAGS:           T(C): 37.1 (12-28-22 @ 20:00), Max: 37.1 (12-28-22 @ 20:00)  HR: 102 (12-28-22 @ 21:08) (101 - 109)  BP: --  RR: 20 (12-28-22 @ 21:00) (14 - 37)  SpO2: 95% (12-28-22 @ 21:08) (94% - 100%)  Wt(kg): --    I&O's Summary    27 Dec 2022 07:01  -  28 Dec 2022 07:00  --------------------------------------------------------  IN: 2112 mL / OUT: 710 mL / NET: 1402 mL    28 Dec 2022 07:01  -  28 Dec 2022 22:12  --------------------------------------------------------  IN: 1644.5 mL / OUT: 2900 mL / NET: -1255.5 mL        Physical Exam:   General: Intubated, multiple lines gtt  Neurology: Nonfocal  Eyes: bilateral pupils equal and reactive   ENT/Neck: +trach, Neck supple, trachea midline, No JVD   Respiratory: rhonchi bilaterally   CV: S1S2, no murmurs        [x] Sinus Rhythm  Abdominal: Softly distended,+BS   Extremities: 1+ pedal edema noted, + peripheral pulses palpable, warm  Skin: No Rashes, Hematoma, Ecchymosis                           Assessment:  61 y/o male with PMH of CABG, DM, HTN, HLD presenting as transfer from [place] with STEMI.     Cardiogenic shock s/p VA ECMO decannulated 12/8  Mitral regurgitation s/p Mitral clip x1 12/16/22  Acute respiratory distress/failure s/p Tracheostomy 12/16/22   IABP placed ( IABP dc'ed 12/17)   At high risk for hemodynamic instability  ERIC/Renal failure  Thrombocytopenia  Anemia        Plan:   ***Neuro***  CT head showed 4mm subdural hematoma 11/26: repeat CT head unchanged 12/01  Soft palate laceration, ENT scoped 12/8, stable, no acute intervention at this time  Repeat Head CT 12/13 Similar minimal increased density along the right aspect of the posterior   falx and right tentorial leaf, consistent with minimal residual subdural   hemorrhage.  No parenchymal hemorrhage or brain edema.  [x] Nonfocal  OOBTC    ***Cardiovascular***  Invasive hemodynamic monitoring, assess perfusion indices         Amiodarone for rate control   [x] VTE prophylaxis with SubQ Heparin   [x] ASA [x] Statin   Serial EKG and cardiac enzymes       ***Pulmonary***  Trach Collar since 5 AM today  Post op vent management   Titration of FiO2 and PEEP, follow SpO2, CXR, blood gasses     Mode: standby,trach collar              ***GI***  [x] Diet: Tolerating TF Glucerna 1.2, @ goal 65cc/hr.  [x] Protonix   Bowel regimen with senna     ***Renal***   [x] ERIC on CRRT   Continue to monitor I/Os, BUN/Creatinine.   Replete lytes PRN  De Dios present       ***ID***  Meropenem for Empiric dosing  Caspofungin for fungemia      ***Endocrine***  [x]  DM2: HbA1c 7.3%                - [x] Insulin gtt               - Need tight glycemic control to prevent wound infection.        Patient requires continuous monitoring with bedside rhythm monitoring, pulse oximetry monitoring, and continuous central venous and arterial pressure monitoring; and intermittent blood gas analysis. Care plan discussed with the ICU care team.   Patient remained critical, at risk for life threatening decompensation.    I have spent 45 minutes providing critical care management to this patient.

## 2022-12-28 NOTE — PROGRESS NOTE ADULT - ATTENDING COMMENTS
ERIC/ATN  Required CRRT, now transitioned to intermittent hemodialysis   mL, kidney function remains poor  Some edema    Ok to use diuretics for volume management - currently non-oliguric  Hemodialysis today for volume management and clearance  Dose meds for HD/eGFR<10  Maintain MAP >65  Remainder per fellow, will follow

## 2022-12-28 NOTE — PROGRESS NOTE ADULT - PROBLEM SELECTOR PLAN 1
Pt with non oliguric ERIC/ ATN in the setting of STEMI, cardiac arrest & cardiogenic shock  SCr on arrival was 2.15; trended down to hector 1.6 on 11/25; slowly trended up & increased to 3.95 11/28.   Pt initiated on CRRT/CVVHDF to optimize volume and electrolytes on 12/5/22. Pt likely with ATN. Pt underwent decannulation of ECMO on 12/8/22 and was taken off CRRT. Pt reinitiated on CRRT overnight on 12/9/22 for volume overload. s/p trach on 12/16. CRRT stopped again 12/19. Bladder scan daily. Now with De Dios. Off Lasix gtt. Plan for HD today. Will need tunneled catheter placed for ongoing dialysis needs once cleared from infectious standpoint. Monitor labs and urine output. Avoid any potential nephrotoxins. Dose medications as per HD.

## 2022-12-28 NOTE — PROGRESS NOTE ADULT - SUBJECTIVE AND OBJECTIVE BOX
Patient seen and examined at the bedside.    Remains critically ill on continuous ICU monitoring.      Brief Summary:  61 y/o male with PMH of CABG, DM, HTN, HLD presenting with a STEMI.   He was in cardiogenic shock requiring VA ECMO, respiratory failure requiring tracheostomy, and renal failure requiring RRT.      24 Hour events:  No acute issues overnight.  On Caspofungin for Candidemia.      Objective:  ICU Vital Signs Last 24 Hrs  T(C): 36.1 (28 Dec 2022 12:50), Max: 37.6 (27 Dec 2022 20:00)  T(F): 97 (28 Dec 2022 12:50), Max: 99.6 (27 Dec 2022 20:00)  HR: 101 (28 Dec 2022 15:00) (99 - 114)  BP: --  BP(mean): --  ABP: 106/47 (28 Dec 2022 15:00) (72/84 - 122/56)  ABP(mean): 67 (28 Dec 2022 15:00) (64 - 116)  RR: 20 (28 Dec 2022 15:00) (14 - 37)  SpO2: 100% (28 Dec 2022 15:00) (94% - 100%)    O2 Parameters below as of 28 Dec 2022 12:50  Patient On (Oxygen Delivery Method): tracheostomy collar                Physical Exam:   General: OOB to chair, no acute distress  Neurology: Following commands, interactive  Eyes: Bilateral pupils reactive   ENT/Neck: +Trach, Neck supple  Respiratory: On trach collar  CV: S1S2, Sinus tachycardia  Abdominal: Soft, NT, ND +BS   Extremities: Warm          -------------------------------------------------------------------------------------------------------------------------------             Labs:                        8.6    14.09 )-----------( 159      ( 28 Dec 2022 00:40 )             27.1         137    |  96     |  104    ----------------------------<  147        ( 28 Dec 2022 00:40 )  4.8     |  23     |  5.50     Ca    8.1        ( 28 Dec 2022 00:40 )  Phos  6.7       ( 28 Dec 2022 00:40 )  Mg     2.8       ( 28 Dec 2022 00:40 )    TPro  6.7    /  Alb  2.9    /  TBili  0.7    /  DBili  x      /  AST  26     /  ALT  21     /  AlkPhos  88     ( 28 Dec 2022 00:40 )    LIVER FUNCTIONS - ( 28 Dec 2022 00:40 )  Alb: 2.9 g/dL / Pro: 6.7 g/dL / ALK PHOS: 88 U/L / ALT: 21 U/L / AST: 26 U/L / GGT: x           ABG - ( 27 Dec 2022 23:23 )  pH, Arterial: 7.47  pH, Blood: x     /  pCO2: 35    /  pO2: 88    / HCO3: 26    / Base Excess: 1.9   /  SaO2: 97.9          ------------------------------------------------------------------------------------------------------------------------------  Assessment:  61 y/o M admitted with a STEMI and cardiogenic shock.     Cardiogenic shock s/p VA ECMO decannulated 12/8  Mitral regurgitation s/p Mitral clip x1 12/16/22  Acute respiratory distress/failure s/p Tracheostomy 12/16/22   At high risk for hemodynamic instability  ERIC/Renal failure  Fungemia  Anemia        Plan:   ***Neuro***  PT ongoing.  Pain control with prns  Optimize day/night cycles.    ***Cardiovascular***  At high risk for hemodynamic instability and cardiac arrhythmias.  Off pressors currently.  PO Amiodarone.  Off beta blockers due to hypotension.   ASA /Statin daily    ***Pulmonary***  Postoperative acute respiratory insufficiency - continuing trach collar trials.  Deep breathing and coughing exercises.  Wean oxygen as able.    ***GI***  Protein calorie malnutrition - Tube feeds via nasal feeding tube  Protonix for stress ulcer prophylaxis   Bowel regimen with senna    ***Renal***  Renal failure - Continue intermittent HD  Replete electrolytes as indicated.  Will need a permacath once blood cultures have cleared.    ***ID***  Candidemia - On Caspofungin  Remains on Merrem - follow up all cultures.    ***Endocrine***  DM with hyperglycemia - insulin infusion.    ***Hematology***  Acute blood loss anemia - no transfusion indication currently, will trend.  SQ Heparin for DVT prophylaxis        Patient remains critical, at risk for life threatening decompensation.    I have spent 35 minutes providing critical care management to this patient.      Electronically signed: Jade Rosas MD Patient seen and examined at the bedside.    Remains critically ill on continuous ICU monitoring.      Brief Summary:  63 y/o male with PMH of CABG, DM, HTN, HLD presenting with a STEMI.   He was in cardiogenic shock requiring VA ECMO, respiratory failure requiring tracheostomy, and renal failure requiring RRT.      24 Hour events:  No acute issues overnight.  On Caspofungin for Candidemia.      Objective:  ICU Vital Signs Last 24 Hrs  T(C): 36.1 (28 Dec 2022 12:50), Max: 37.6 (27 Dec 2022 20:00)  T(F): 97 (28 Dec 2022 12:50), Max: 99.6 (27 Dec 2022 20:00)  HR: 101 (28 Dec 2022 15:00) (99 - 114)  BP: --  BP(mean): --  ABP: 106/47 (28 Dec 2022 15:00) (72/84 - 122/56)  ABP(mean): 67 (28 Dec 2022 15:00) (64 - 116)  RR: 20 (28 Dec 2022 15:00) (14 - 37)  SpO2: 100% (28 Dec 2022 15:00) (94% - 100%)    O2 Parameters below as of 28 Dec 2022 12:50  Patient On (Oxygen Delivery Method): tracheostomy collar                Physical Exam:   General: OOB to chair, no acute distress  Neurology: Following commands, interactive  Eyes: Bilateral pupils reactive   ENT/Neck: +Trach, Neck supple  Respiratory: On trach collar  CV: S1S2, Sinus tachycardia  Abdominal: Soft, NT, ND +BS   Extremities: Warm          -------------------------------------------------------------------------------------------------------------------------------             Labs:                        8.6    14.09 )-----------( 159      ( 28 Dec 2022 00:40 )             27.1         137    |  96     |  104    ----------------------------<  147        ( 28 Dec 2022 00:40 )  4.8     |  23     |  5.50     Ca    8.1        ( 28 Dec 2022 00:40 )  Phos  6.7       ( 28 Dec 2022 00:40 )  Mg     2.8       ( 28 Dec 2022 00:40 )    TPro  6.7    /  Alb  2.9    /  TBili  0.7    /  DBili  x      /  AST  26     /  ALT  21     /  AlkPhos  88     ( 28 Dec 2022 00:40 )    LIVER FUNCTIONS - ( 28 Dec 2022 00:40 )  Alb: 2.9 g/dL / Pro: 6.7 g/dL / ALK PHOS: 88 U/L / ALT: 21 U/L / AST: 26 U/L / GGT: x           ABG - ( 27 Dec 2022 23:23 )  pH, Arterial: 7.47  pH, Blood: x     /  pCO2: 35    /  pO2: 88    / HCO3: 26    / Base Excess: 1.9   /  SaO2: 97.9          ------------------------------------------------------------------------------------------------------------------------------  Assessment:  63 y/o M admitted with a STEMI and cardiogenic shock.     Cardiogenic shock s/p VA ECMO decannulated 12/8  Mitral regurgitation s/p Mitral clip x1 12/16/22  Acute respiratory distress/failure s/p Tracheostomy 12/16/22   At high risk for hemodynamic instability  ERIC/Renal failure  Fungemia  Anemia        Plan:   ***Neuro***  PT ongoing.  Pain control with prns  Optimize day/night cycles.    ***Cardiovascular***  At high risk for hemodynamic instability and cardiac arrhythmias.  Off pressors currently.  PO Amiodarone.  Off beta blockers due to hypotension.   ASA /Statin daily    ***Pulmonary***  Postoperative acute respiratory insufficiency - continuing trach collar trials.  Deep breathing and coughing exercises.  Wean oxygen as able.    ***GI***  Protein calorie malnutrition - Tube feeds via nasal feeding tube  Protonix for stress ulcer prophylaxis   Bowel regimen with senna    ***Renal***  Renal failure - Continue intermittent HD  Replete electrolytes as indicated.  Will need a permacath once blood cultures have cleared.    ***ID***  Candidemia - On Caspofungin  Remains on Merrem - follow up all cultures.    ***Endocrine***  DM with hyperglycemia - insulin infusion.    ***Hematology***  Acute blood loss anemia - no transfusion indication currently, will trend.  SQ Heparin for DVT prophylaxis        Patient remains critical, at risk for life threatening decompensation.    I have spent 35 minutes providing critical care management to this patient.      Electronically signed: Jade Rosas MD

## 2022-12-28 NOTE — PROGRESS NOTE ADULT - ASSESSMENT
62M CAD s/p CABG, DM, HTN, presented as a trasnfer from [place] for STEMI, found to have pancreatitis not necrotizing and cholecystitis on CT AP, admitted to to SICU for respiratory failure due to ARDS. Moved to the CTU for ECMO for cardiogenic shock vs ARDS, ECMO decannulated on 12/8 with IABP placement and mitral clip and removed on 12/17, s/p trac on 12/16/22, now on trach collar.    Febrile, tachycardic, hypotensive   Leukocytosis   CXR on 12/26: Slight improvement in the pulmonary vascular congestion  Trach Cx on 12/26: normal respiratory letha  LIJ central line replaced by R IJ central line on 12/25  S/P meropenem, vanco and diflucan  Received 1 dose of Vanc and started on Meropenem     #fever, Persistent leukocytosis   #Pancreatitis - improved  #Cardiogenic shock - resolved    Recommendations:  Continue caspofungin 50mg daily  Continue meropenem for now, will plan to de-escalate if Bcx negative for bacteria and clinical improvement  Follow repeat blood cultures  Obtain TTE  Ophthalmology evaluation                    62M CAD s/p CABG, DM, HTN, presented as a trasnfer from [place] for STEMI, found to have pancreatitis not necrotizing and cholecystitis on CT AP, admitted to to SICU for respiratory failure due to ARDS. Moved to the CTU for ECMO for cardiogenic shock vs ARDS, ECMO decannulated on 12/8 with IABP placement and mitral clip and removed on 12/17, s/p trac on 12/16/22, now on trach collar.    Febrile, tachycardic, hypotensive   Leukocytosis   CXR on 12/26: Slight improvement in the pulmonary vascular congestion  Trach Cx on 12/26: normal respiratory letha  LIJ central line replaced by R IJ central line on 12/25  S/P meropenem, vanco and diflucan  Received 1 dose of Vanc and started on Meropenem     #fever, Persistent leukocytosis   #Pancreatitis - improved  #Cardiogenic shock - resolved    Recommendations:  Continue caspofungin 50mg daily  Continue meropenem for now, will plan to de-escalate if Bcx negative for bacteria and clinical improvement  Follow repeat blood cultures  Obtain TTE  Ophthalmology evaluation                    62M CAD s/p CABG, DM, HTN, presented as a trasnfer from [place] for STEMI, found to have pancreatitis not necrotizing and cholecystitis on CT AP, admitted to to SICU for respiratory failure due to ARDS. Moved to the CTU for ECMO for cardiogenic shock vs ARDS, ECMO decannulated on 12/8 with IABP placement and mitral clip and removed on 12/17, s/p trac on 12/16/22, now on trach collar.    Febrile, tachycardic, hypotensive   Leukocytosis   CXR on 12/26: Slight improvement in the pulmonary vascular congestion  Trach Cx on 12/26: normal respiratory letha  LIJ central line replaced by R IJ central line on 12/25  S/P meropenem, vanco and diflucan  Received 1 dose of Vanc and started on Meropenem     #fever, Persistent leukocytosis   #Pancreatitis - improved  #Cardiogenic shock - resolved    Recommendations:  Continue caspofungin 50mg daily  Continue meropenem for now, will plan to de-escalate if Bcx negative for bacteria and clinical improvement  Follow repeat blood cultures  Obtain TTE  Ophthalmology evaluation                    62M CAD s/p CABG, DM, HTN, presented as a trasnfer from [place] for STEMI, found to have pancreatitis not necrotizing and cholecystitis on CT AP, admitted to to SICU for respiratory failure due to ARDS. Moved to the CTU for ECMO for cardiogenic shock vs ARDS, ECMO decannulated on 12/8 with IABP placement and mitral clip and removed on 12/17, s/p trac on 12/16/22, now on trach collar.    #Candidemia  Bcx positive for candida tropicalis on 12/26/22  Repeat pending    Recommendations:  Continue caspofungin 50mg daily  Continue meropenem for now, will plan to de-escalate if Bcx negative for bacteria and clinical improvement  Follow repeat blood cultures  Obtain TTE  Ophthalmology evaluation  Would remove lines as able due to concern for potential seeding of infection  Follow fever curve and WBC count    Ricky Burt MD  Division of Infectious Diseases  Available on Teams                    62M CAD s/p CABG, DM, HTN, presented as a trasnfer from [place] for STEMI, found to have pancreatitis not necrotizing and cholecystitis on CT AP, admitted to to SICU for respiratory failure due to ARDS. Moved to the CTU for ECMO for cardiogenic shock vs ARDS, ECMO decannulated on 12/8 with IABP placement and mitral clip and removed on 12/17, s/p trac on 12/16/22, now on trach collar.    #Candidemia  Bcx positive for candida tropicalis on 12/26/22  Repeat pending    Recommendations:  Continue caspofungin 50mg daily  Continue meropenem for now, will plan to de-escalate if Bcx negative for bacteria and clinical improvement  Follow repeat blood cultures  Obtain TTE  Ophthalmology evaluation  Would remove lines as able due to concern for potential seeding of infection  Follow fever curve and WBC count    Ricky Burt MD  Division of Infectious Diseases  Available on Teams                    62M CAD s/p CABG, DM, HTN, presented as a trasnfer from [place] for STEMI, found to have pancreatitis not necrotizing and cholecystitis on CT AP, admitted to to SICU for respiratory failure due to ARDS. Moved to the CTU for ECMO for cardiogenic shock vs ARDS, ECMO decannulated on 12/8 with IABP placement and mitral clip and removed on 12/17, s/p trac on 12/16/22, now on trach collar.    #Candidemia  Bcx positive for candida tropicalis on 12/26/22  Repeat pending    Recommendations:  Continue caspofungin 50mg daily  Continue meropenem for now, will plan to de-escalate if Bcx negative for bacteria and clinical improvement  Follow repeat blood cultures  Obtain TTE  Ophthalmology evaluation  Would remove lines as able due to concern for potential seeding of infection  Follow fever curve and WBC count    Ricky Burt MD  Division of Infectious Diseases  Available on Teams

## 2022-12-28 NOTE — PROGRESS NOTE ADULT - SUBJECTIVE AND OBJECTIVE BOX
Follow Up:  Candidemia    Interval History/ROS:  Overnight: No acute events.  Patient remains afebrile past 48 hours.  Latest labs show leukocytosis of 14.  CMP with elevated creatinine to 5.5, hepatic function within normal limits.  Blood cultures from 12/26/2022 growing Candida tropicalis.  Repeat blood cultures pending.    Patient seen examined at bedside.      Allergies  No Known Allergies        ANTIMICROBIALS:  caspofungin IVPB 50 every 24 hours  caspofungin IVPB    meropenem  IVPB 500 <User Schedule>      OTHER MEDS:  MEDICATIONS  (STANDING):  acetaminophen    Suspension .. 650 every 6 hours PRN  aMIOdarone    Tablet 200 daily  aspirin  chewable 81 daily  atorvastatin 80 at bedtime  dextrose 50% Injectable 50 every 15 minutes  heparin   Injectable 5000 every 8 hours  insulin regular Infusion 2 <Continuous>  pantoprazole  Injectable 40 daily  senna 2 at bedtime      Vital Signs Last 24 Hrs  T(C): 36.5 (28 Dec 2022 12:00), Max: 37.6 (27 Dec 2022 20:00)  T(F): 97.7 (28 Dec 2022 12:00), Max: 99.6 (27 Dec 2022 20:00)  HR: 106 (28 Dec 2022 13:00) (99 - 114)  BP: --  BP(mean): --  RR: 31 (28 Dec 2022 13:00) (14 - 37)  SpO2: 97% (28 Dec 2022 13:00) (94% - 100%)    Parameters below as of 28 Dec 2022 12:00  Patient On (Oxygen Delivery Method): tracheostomy collar    O2 Concentration (%): 40    PHYSICAL EXAM:  Ear/Nose/Throat: no oral lesion, no sinus tenderness on percussion	  Neck:no JVD, no lymphadenopathy, supple  Respiratory: CTA lore  Cardiovascular: S1S2 RRR, no murmurs  Gastrointestinal:soft, (+) BS, no HSM  Extremities:no e/e/c                        8.6    14.09 )-----------( 159      ( 28 Dec 2022 00:40 )             27.1       12-28    137  |  96  |  104<H>  ----------------------------<  147<H>  4.8   |  23  |  5.50<H>    Ca    8.1<L>      28 Dec 2022 00:40  Phos  6.7     12-28  Mg     2.8     12-28    TPro  6.7  /  Alb  2.9<L>  /  TBili  0.7  /  DBili  x   /  AST  26  /  ALT  21  /  AlkPhos  88  12-28          MICROBIOLOGY:  v    Culture - Sputum (collected 26 Dec 2022 11:54)  Source: Trach Asp Tracheal Aspirate  Gram Stain (26 Dec 2022 21:52):    No polymorphonuclear leukocytes per low power field    Few Squamous epithelial cells per low power field    Moderate Gram Positive Rods per oil power field  Preliminary Report (27 Dec 2022 18:41):    Normal Respiratory Christy present    Culture - Blood (collected 26 Dec 2022 10:43)  Source: .Blood Blood  Preliminary Report (27 Dec 2022 17:01):    No growth to date.    Culture - Blood (collected 26 Dec 2022 06:38)  Source: .Blood Blood-Peripheral  Preliminary Report (27 Dec 2022 10:02):    No growth to date.    Culture - Blood (collected 26 Dec 2022 02:46)  Source: .Blood Blood-Peripheral  Gram Stain (27 Dec 2022 17:14):    Growth in aerobic bottle: Yeast like cells  Preliminary Report (27 Dec 2022 17:14):    Growth in aerobic bottle: Yeast like cells    ***Blood Panel PCR results on this specimen are available    approximately 3 hours after the Gram stain result.***    Gram stain, PCR, and/or culture results may not always    correspond due to difference in methodologies.    ************************************************************    This PCR assay was performed by multiplex PCR. This    Assay tests for 66 bacterial and resistance gene targets.    Please refer to the [facility] test directory    at [email or web address] for details.  Organism: Blood Culture PCR (27 Dec 2022 19:42)  Organism: Blood Culture PCR (27 Dec 2022 19:42)      -  Candida tropicalis: Detec      Method Type: PCR           Follow Up:  Candidemia    Interval History/ROS:  Overnight: No acute events.  Patient remains afebrile past 48 hours.  Latest labs show leukocytosis of 14.  CMP with elevated creatinine to 5.5, hepatic function within normal limits.  Blood cultures from 12/26/2022 growing Candida tropicalis.  Repeat blood cultures pending.    Patient seen examined at bedside.      Allergies  No Known Allergies        ANTIMICROBIALS:  caspofungin IVPB 50 every 24 hours  caspofungin IVPB    meropenem  IVPB 500 <User Schedule>      OTHER MEDS:  MEDICATIONS  (STANDING):  acetaminophen    Suspension .. 650 every 6 hours PRN  aMIOdarone    Tablet 200 daily  aspirin  chewable 81 daily  atorvastatin 80 at bedtime  dextrose 50% Injectable 50 every 15 minutes  heparin   Injectable 5000 every 8 hours  insulin regular Infusion 2 <Continuous>  pantoprazole  Injectable 40 daily  senna 2 at bedtime      Vital Signs Last 24 Hrs  T(C): 36.5 (28 Dec 2022 12:00), Max: 37.6 (27 Dec 2022 20:00)  T(F): 97.7 (28 Dec 2022 12:00), Max: 99.6 (27 Dec 2022 20:00)  HR: 106 (28 Dec 2022 13:00) (99 - 114)  BP: --  BP(mean): --  RR: 31 (28 Dec 2022 13:00) (14 - 37)  SpO2: 97% (28 Dec 2022 13:00) (94% - 100%)    Parameters below as of 28 Dec 2022 12:00  Patient On (Oxygen Delivery Method): tracheostomy collar    O2 Concentration (%): 40    PHYSICAL EXAM:  Ear/Nose/Throat: no oral lesion, no sinus tenderness on percussion	  Neck:no JVD, no lymphadenopathy, supple  Respiratory: CTA lore  Cardiovascular: S1S2 RRR, no murmurs  Gastrointestinal:soft, (+) BS, no HSM  Extremities:no e/e/c                        8.6    14.09 )-----------( 159      ( 28 Dec 2022 00:40 )             27.1       12-28    137  |  96  |  104<H>  ----------------------------<  147<H>  4.8   |  23  |  5.50<H>    Ca    8.1<L>      28 Dec 2022 00:40  Phos  6.7     12-28  Mg     2.8     12-28    TPro  6.7  /  Alb  2.9<L>  /  TBili  0.7  /  DBili  x   /  AST  26  /  ALT  21  /  AlkPhos  88  12-28          MICROBIOLOGY:  v    Culture - Sputum (collected 26 Dec 2022 11:54)  Source: Trach Asp Tracheal Aspirate  Gram Stain (26 Dec 2022 21:52):    No polymorphonuclear leukocytes per low power field    Few Squamous epithelial cells per low power field    Moderate Gram Positive Rods per oil power field  Preliminary Report (27 Dec 2022 18:41):    Normal Respiratory Christy present    Culture - Blood (collected 26 Dec 2022 10:43)  Source: .Blood Blood  Preliminary Report (27 Dec 2022 17:01):    No growth to date.    Culture - Blood (collected 26 Dec 2022 06:38)  Source: .Blood Blood-Peripheral  Preliminary Report (27 Dec 2022 10:02):    No growth to date.    Culture - Blood (collected 26 Dec 2022 02:46)  Source: .Blood Blood-Peripheral  Gram Stain (27 Dec 2022 17:14):    Growth in aerobic bottle: Yeast like cells  Preliminary Report (27 Dec 2022 17:14):    Growth in aerobic bottle: Yeast like cells    ***Blood Panel PCR results on this specimen are available    approximately 3 hours after the Gram stain result.***    Gram stain, PCR, and/or culture results may not always    correspond due to difference in methodologies.    ************************************************************    This PCR assay was performed by multiplex PCR. This    Assay tests for 66 bacterial and resistance gene targets.    Please refer to the [facility] Labs test directory    at [email or web address] for details.  Organism: Blood Culture PCR (27 Dec 2022 19:42)  Organism: Blood Culture PCR (27 Dec 2022 19:42)      -  Candida tropicalis: Detec      Method Type: PCR           Follow Up:  Candidemia    Interval History/ROS:  Overnight: No acute events.  Patient remains afebrile past 48 hours.  Latest labs show leukocytosis of 14.  CMP with elevated creatinine to 5.5, hepatic function within normal limits.  Blood cultures from 12/26/2022 growing Candida tropicalis.  Repeat blood cultures pending.    Patient seen examined at bedside.      Allergies  No Known Allergies        ANTIMICROBIALS:  caspofungin IVPB 50 every 24 hours  caspofungin IVPB    meropenem  IVPB 500 <User Schedule>      OTHER MEDS:  MEDICATIONS  (STANDING):  acetaminophen    Suspension .. 650 every 6 hours PRN  aMIOdarone    Tablet 200 daily  aspirin  chewable 81 daily  atorvastatin 80 at bedtime  dextrose 50% Injectable 50 every 15 minutes  heparin   Injectable 5000 every 8 hours  insulin regular Infusion 2 <Continuous>  pantoprazole  Injectable 40 daily  senna 2 at bedtime      Vital Signs Last 24 Hrs  T(C): 36.5 (28 Dec 2022 12:00), Max: 37.6 (27 Dec 2022 20:00)  T(F): 97.7 (28 Dec 2022 12:00), Max: 99.6 (27 Dec 2022 20:00)  HR: 106 (28 Dec 2022 13:00) (99 - 114)  BP: --  BP(mean): --  RR: 31 (28 Dec 2022 13:00) (14 - 37)  SpO2: 97% (28 Dec 2022 13:00) (94% - 100%)    Parameters below as of 28 Dec 2022 12:00  Patient On (Oxygen Delivery Method): tracheostomy collar    O2 Concentration (%): 40    PHYSICAL EXAM:  Ear/Nose/Throat: no oral lesion, no sinus tenderness on percussion	  Neck:no JVD, no lymphadenopathy, supple  Respiratory: CTA lore  Cardiovascular: S1S2 RRR, no murmurs  Gastrointestinal:soft, (+) BS, no HSM  Extremities:no e/e/c                        8.6    14.09 )-----------( 159      ( 28 Dec 2022 00:40 )             27.1       12-28    137  |  96  |  104<H>  ----------------------------<  147<H>  4.8   |  23  |  5.50<H>    Ca    8.1<L>      28 Dec 2022 00:40  Phos  6.7     12-28  Mg     2.8     12-28    TPro  6.7  /  Alb  2.9<L>  /  TBili  0.7  /  DBili  x   /  AST  26  /  ALT  21  /  AlkPhos  88  12-28          MICROBIOLOGY:  v    Culture - Sputum (collected 26 Dec 2022 11:54)  Source: Trach Asp Tracheal Aspirate  Gram Stain (26 Dec 2022 21:52):    No polymorphonuclear leukocytes per low power field    Few Squamous epithelial cells per low power field    Moderate Gram Positive Rods per oil power field  Preliminary Report (27 Dec 2022 18:41):    Normal Respiratory Christy present    Culture - Blood (collected 26 Dec 2022 10:43)  Source: .Blood Blood  Preliminary Report (27 Dec 2022 17:01):    No growth to date.    Culture - Blood (collected 26 Dec 2022 06:38)  Source: .Blood Blood-Peripheral  Preliminary Report (27 Dec 2022 10:02):    No growth to date.    Culture - Blood (collected 26 Dec 2022 02:46)  Source: .Blood Blood-Peripheral  Gram Stain (27 Dec 2022 17:14):    Growth in aerobic bottle: Yeast like cells  Preliminary Report (27 Dec 2022 17:14):    Growth in aerobic bottle: Yeast like cells    ***Blood Panel PCR results on this specimen are available    approximately 3 hours after the Gram stain result.***    Gram stain, PCR, and/or culture results may not always    correspond due to difference in methodologies.    ************************************************************    This PCR assay was performed by multiplex PCR. This    Assay tests for 66 bacterial and resistance gene targets.    Please refer to the [facility] Labs test directory    at [email or web address] for details.  Organism: Blood Culture PCR (27 Dec 2022 19:42)  Organism: Blood Culture PCR (27 Dec 2022 19:42)      -  Candida tropicalis: Detec      Method Type: PCR           Follow Up:  Candidemia    Interval History/ROS:  Overnight: No acute events.  Patient remains afebrile past 48 hours.  Latest labs show leukocytosis of 14.  CMP with elevated creatinine to 5.5, hepatic function within normal limits.  Blood cultures from 12/26/2022 growing Candida tropicalis.  Repeat blood cultures pending.    Patient seen examined at bedside. Overall not distress, denies any new pain or discomfort.      Allergies  No Known Allergies        ANTIMICROBIALS:  caspofungin IVPB 50 every 24 hours  caspofungin IVPB    meropenem  IVPB 500 <User Schedule>      OTHER MEDS:  MEDICATIONS  (STANDING):  acetaminophen    Suspension .. 650 every 6 hours PRN  aMIOdarone    Tablet 200 daily  aspirin  chewable 81 daily  atorvastatin 80 at bedtime  dextrose 50% Injectable 50 every 15 minutes  heparin   Injectable 5000 every 8 hours  insulin regular Infusion 2 <Continuous>  pantoprazole  Injectable 40 daily  senna 2 at bedtime      Vital Signs Last 24 Hrs  T(C): 36.5 (28 Dec 2022 12:00), Max: 37.6 (27 Dec 2022 20:00)  T(F): 97.7 (28 Dec 2022 12:00), Max: 99.6 (27 Dec 2022 20:00)  HR: 106 (28 Dec 2022 13:00) (99 - 114)  BP: --  BP(mean): --  RR: 31 (28 Dec 2022 13:00) (14 - 37)  SpO2: 97% (28 Dec 2022 13:00) (94% - 100%)    Parameters below as of 28 Dec 2022 12:00  Patient On (Oxygen Delivery Method): tracheostomy collar    O2 Concentration (%): 40    PHYSICAL EXAM:  Ear/Nose/Throat: no oral lesion, no sinus tenderness on percussion	  Neck:no JVD, no lymphadenopathy, supple  Respiratory: CTA lore  Cardiovascular: S1S2 RRR, no murmurs  Gastrointestinal:soft, (+) BS, no HSM  Extremities:no e/e/c                        8.6    14.09 )-----------( 159      ( 28 Dec 2022 00:40 )             27.1       12-28    137  |  96  |  104<H>  ----------------------------<  147<H>  4.8   |  23  |  5.50<H>    Ca    8.1<L>      28 Dec 2022 00:40  Phos  6.7     12-28  Mg     2.8     12-28    TPro  6.7  /  Alb  2.9<L>  /  TBili  0.7  /  DBili  x   /  AST  26  /  ALT  21  /  AlkPhos  88  12-28          MICROBIOLOGY:  v    Culture - Sputum (collected 26 Dec 2022 11:54)  Source: Trach Asp Tracheal Aspirate  Gram Stain (26 Dec 2022 21:52):    No polymorphonuclear leukocytes per low power field    Few Squamous epithelial cells per low power field    Moderate Gram Positive Rods per oil power field  Preliminary Report (27 Dec 2022 18:41):    Normal Respiratory Christy present    Culture - Blood (collected 26 Dec 2022 10:43)  Source: .Blood Blood  Preliminary Report (27 Dec 2022 17:01):    No growth to date.    Culture - Blood (collected 26 Dec 2022 06:38)  Source: .Blood Blood-Peripheral  Preliminary Report (27 Dec 2022 10:02):    No growth to date.    Culture - Blood (collected 26 Dec 2022 02:46)  Source: .Blood Blood-Peripheral  Gram Stain (27 Dec 2022 17:14):    Growth in aerobic bottle: Yeast like cells  Preliminary Report (27 Dec 2022 17:14):    Growth in aerobic bottle: Yeast like cells    ***Blood Panel PCR results on this specimen are available    approximately 3 hours after the Gram stain result.***    Gram stain, PCR, and/or culture results may not always    correspond due to difference in methodologies.    ************************************************************    This PCR assay was performed by multiplex PCR. This    Assay tests for 66 bacterial and resistance gene targets.    Please refer to the [facility] Labs test directory    at [email or web address] for details.  Organism: Blood Culture PCR (27 Dec 2022 19:42)  Organism: Blood Culture PCR (27 Dec 2022 19:42)      -  Candida tropicalis: Detec      Method Type: PCR           Follow Up:  Candidemia    Interval History/ROS:  Overnight: No acute events.  Patient remains afebrile past 48 hours.  Latest labs show leukocytosis of 14.  CMP with elevated creatinine to 5.5, hepatic function within normal limits.  Blood cultures from 12/26/2022 growing Candida tropicalis.  Repeat blood cultures pending.    Patient seen examined at bedside. Overall not distress, denies any new pain or discomfort.      Allergies  No Known Allergies        ANTIMICROBIALS:  caspofungin IVPB 50 every 24 hours  caspofungin IVPB    meropenem  IVPB 500 <User Schedule>      OTHER MEDS:  MEDICATIONS  (STANDING):  acetaminophen    Suspension .. 650 every 6 hours PRN  aMIOdarone    Tablet 200 daily  aspirin  chewable 81 daily  atorvastatin 80 at bedtime  dextrose 50% Injectable 50 every 15 minutes  heparin   Injectable 5000 every 8 hours  insulin regular Infusion 2 <Continuous>  pantoprazole  Injectable 40 daily  senna 2 at bedtime      Vital Signs Last 24 Hrs  T(C): 36.5 (28 Dec 2022 12:00), Max: 37.6 (27 Dec 2022 20:00)  T(F): 97.7 (28 Dec 2022 12:00), Max: 99.6 (27 Dec 2022 20:00)  HR: 106 (28 Dec 2022 13:00) (99 - 114)  BP: --  BP(mean): --  RR: 31 (28 Dec 2022 13:00) (14 - 37)  SpO2: 97% (28 Dec 2022 13:00) (94% - 100%)    Parameters below as of 28 Dec 2022 12:00  Patient On (Oxygen Delivery Method): tracheostomy collar    O2 Concentration (%): 40    PHYSICAL EXAM:  Ear/Nose/Throat: no oral lesion, no sinus tenderness on percussion	  Neck:no JVD, no lymphadenopathy, supple  Respiratory: CTA lore  Cardiovascular: S1S2 RRR, no murmurs  Gastrointestinal:soft, (+) BS, no HSM  Extremities:no e/e/c                        8.6    14.09 )-----------( 159      ( 28 Dec 2022 00:40 )             27.1       12-28    137  |  96  |  104<H>  ----------------------------<  147<H>  4.8   |  23  |  5.50<H>    Ca    8.1<L>      28 Dec 2022 00:40  Phos  6.7     12-28  Mg     2.8     12-28    TPro  6.7  /  Alb  2.9<L>  /  TBili  0.7  /  DBili  x   /  AST  26  /  ALT  21  /  AlkPhos  88  12-28          MICROBIOLOGY:  v    Culture - Sputum (collected 26 Dec 2022 11:54)  Source: Trach Asp Tracheal Aspirate  Gram Stain (26 Dec 2022 21:52):    No polymorphonuclear leukocytes per low power field    Few Squamous epithelial cells per low power field    Moderate Gram Positive Rods per oil power field  Preliminary Report (27 Dec 2022 18:41):    Normal Respiratory Christy present    Culture - Blood (collected 26 Dec 2022 10:43)  Source: .Blood Blood  Preliminary Report (27 Dec 2022 17:01):    No growth to date.    Culture - Blood (collected 26 Dec 2022 06:38)  Source: .Blood Blood-Peripheral  Preliminary Report (27 Dec 2022 10:02):    No growth to date.    Culture - Blood (collected 26 Dec 2022 02:46)  Source: .Blood Blood-Peripheral  Gram Stain (27 Dec 2022 17:14):    Growth in aerobic bottle: Yeast like cells  Preliminary Report (27 Dec 2022 17:14):    Growth in aerobic bottle: Yeast like cells    ***Blood Panel PCR results on this specimen are available    approximately 3 hours after the Gram stain result.***    Gram stain, PCR, and/or culture results may not always    correspond due to difference in methodologies.    ************************************************************    This PCR assay was performed by multiplex PCR. This    Assay tests for 66 bacterial and resistance gene targets.    Please refer to the [facility] Labs test directory    at [email or web address] for details.  Organism: Blood Culture PCR (27 Dec 2022 19:42)  Organism: Blood Culture PCR (27 Dec 2022 19:42)      -  Candida tropicalis: Detec      Method Type: PCR           Follow Up:  Candidemia    Interval History/ROS:  Overnight: No acute events.  Patient remains afebrile past 48 hours.  Latest labs show leukocytosis of 14.  CMP with elevated creatinine to 5.5, hepatic function within normal limits.  Blood cultures from 12/26/2022 growing Candida tropicalis.  Repeat blood cultures pending.    Patient seen examined at bedside. Overall not distress, denies any new pain or discomfort.      Allergies  No Known Allergies        ANTIMICROBIALS:  caspofungin IVPB 50 every 24 hours  caspofungin IVPB    meropenem  IVPB 500 <User Schedule>      OTHER MEDS:  MEDICATIONS  (STANDING):  acetaminophen    Suspension .. 650 every 6 hours PRN  aMIOdarone    Tablet 200 daily  aspirin  chewable 81 daily  atorvastatin 80 at bedtime  dextrose 50% Injectable 50 every 15 minutes  heparin   Injectable 5000 every 8 hours  insulin regular Infusion 2 <Continuous>  pantoprazole  Injectable 40 daily  senna 2 at bedtime      Vital Signs Last 24 Hrs  T(C): 36.5 (28 Dec 2022 12:00), Max: 37.6 (27 Dec 2022 20:00)  T(F): 97.7 (28 Dec 2022 12:00), Max: 99.6 (27 Dec 2022 20:00)  HR: 106 (28 Dec 2022 13:00) (99 - 114)  BP: --  BP(mean): --  RR: 31 (28 Dec 2022 13:00) (14 - 37)  SpO2: 97% (28 Dec 2022 13:00) (94% - 100%)    Parameters below as of 28 Dec 2022 12:00  Patient On (Oxygen Delivery Method): tracheostomy collar    O2 Concentration (%): 40    PHYSICAL EXAM:  Ear/Nose/Throat: no oral lesion, no sinus tenderness on percussion	  Neck:no JVD, no lymphadenopathy, supple  Respiratory: CTA lore  Cardiovascular: S1S2 RRR, no murmurs  Gastrointestinal:soft, (+) BS, no HSM  Extremities:no e/e/c                        8.6    14.09 )-----------( 159      ( 28 Dec 2022 00:40 )             27.1       12-28    137  |  96  |  104<H>  ----------------------------<  147<H>  4.8   |  23  |  5.50<H>    Ca    8.1<L>      28 Dec 2022 00:40  Phos  6.7     12-28  Mg     2.8     12-28    TPro  6.7  /  Alb  2.9<L>  /  TBili  0.7  /  DBili  x   /  AST  26  /  ALT  21  /  AlkPhos  88  12-28          MICROBIOLOGY:  v    Culture - Sputum (collected 26 Dec 2022 11:54)  Source: Trach Asp Tracheal Aspirate  Gram Stain (26 Dec 2022 21:52):    No polymorphonuclear leukocytes per low power field    Few Squamous epithelial cells per low power field    Moderate Gram Positive Rods per oil power field  Preliminary Report (27 Dec 2022 18:41):    Normal Respiratory Christy present    Culture - Blood (collected 26 Dec 2022 10:43)  Source: .Blood Blood  Preliminary Report (27 Dec 2022 17:01):    No growth to date.    Culture - Blood (collected 26 Dec 2022 06:38)  Source: .Blood Blood-Peripheral  Preliminary Report (27 Dec 2022 10:02):    No growth to date.    Culture - Blood (collected 26 Dec 2022 02:46)  Source: .Blood Blood-Peripheral  Gram Stain (27 Dec 2022 17:14):    Growth in aerobic bottle: Yeast like cells  Preliminary Report (27 Dec 2022 17:14):    Growth in aerobic bottle: Yeast like cells    ***Blood Panel PCR results on this specimen are available    approximately 3 hours after the Gram stain result.***    Gram stain, PCR, and/or culture results may not always    correspond due to difference in methodologies.    ************************************************************    This PCR assay was performed by multiplex PCR. This    Assay tests for 66 bacterial and resistance gene targets.    Please refer to the [facility] Labs test directory    at [email or web address] for details.  Organism: Blood Culture PCR (27 Dec 2022 19:42)  Organism: Blood Culture PCR (27 Dec 2022 19:42)      -  Candida tropicalis: Detec      Method Type: PCR

## 2022-12-28 NOTE — PROGRESS NOTE ADULT - SUBJECTIVE AND OBJECTIVE BOX
[facility] DIVISION OF KIDNEY DISEASES AND HYPERTENSION -- FOLLOW UP NOTE  --------------------------------------------------------------------------------  Chief Complaint:Acute pancreatitis without infection or necrosis    HPI:  63 y/o male with PMH of CABG, DM, HTN, HLD presenting as transfer from [place] for c/f STEMI. Course c/b gallstone pancreatitis leading to ARDS and shock & cardiac arrest now on [facility] ECMO. Had significant troponin elevation and his LVEF down to 8%. Pt was deemed to be too unstable to have an angiogram and potential PCI due to his co-morbidities & a new subdural bleed. Pt being seen for ERIC. SCr on arrival was 2.15; trended down to hector 1.6 on 11/25 and eventually increased to 3.95 11/28. Pt received IV diuretics as per CTU. Pt initiated on CRRT on 12/5/22 to optimize volume status and electrolytes.  Pt underwent decannulation of ECMO on 12/8/22. Pt was restarted on 12/9/22 for volume overload. Pt underwent mitral clip OR (12/16/22).  s/p trach on 12/16     HPI: Pt. seen this AM. For HD today      PAST HISTORY  --------------------------------------------------------------------------------  No significant changes to PMH, PSH, FHx, SHx, unless otherwise noted    ALLERGIES & MEDICATIONS  --------------------------------------------------------------------------------  Allergies    No Known Allergies    Intolerances      Standing Inpatient Medications  aMIOdarone    Tablet 200 milliGRAM(s) Oral daily  aspirin  chewable 81 milliGRAM(s) Oral daily  atorvastatin 80 milliGRAM(s) Oral at bedtime  caspofungin IVPB 50 milliGRAM(s) IV Intermittent every 24 hours  caspofungin IVPB      chlorhexidine 0.12% Liquid 15 milliLiter(s) Oral Mucosa every 12 hours  chlorhexidine 2% Cloths 1 Application(s) Topical <User Schedule>  dextrose 50% Injectable 50 milliLiter(s) IV Push every 15 minutes  heparin   Injectable 5000 Unit(s) SubCutaneous every 8 hours  insulin regular Infusion 2 Unit(s)/Hr IV Continuous <Continuous>  meropenem  IVPB 500 milliGRAM(s) IV Intermittent <User Schedule>  multivitamin/minerals/iron Oral Solution (CENTRUM) 15 milliLiter(s) Oral daily  pantoprazole  Injectable 40 milliGRAM(s) IV Push daily  senna 2 Tablet(s) Oral at bedtime  thiamine 100 milliGRAM(s) Enteral Tube daily    PRN Inpatient Medications  acetaminophen    Suspension .. 650 milliGRAM(s) Oral every 6 hours PRN  sodium chloride 0.9% lock flush 10 milliLiter(s) IV Push every 1 hour PRN      REVIEW OF SYSTEMS  --------------------------------------------------------------------------------  Unable to obtain    VITALS/PHYSICAL EXAM  --------------------------------------------------------------------------------  T(C): 36.8 (12-28-22 @ 08:00), Max: 37.6 (12-27-22 @ 20:00)  HR: 104 (12-28-22 @ 08:00) (99 - 114)  BP: --  RR: 37 (12-28-22 @ 08:00) (14 - 37)  SpO2: 100% (12-28-22 @ 08:00) (94% - 100%)  Wt(kg): --        12-27-22 @ 07:01  -  12-28-22 @ 07:00  --------------------------------------------------------  IN: 2112 mL / OUT: 710 mL / NET: 1402 mL    12-28-22 @ 07:01  -  12-28-22 @ 08:49  --------------------------------------------------------  IN: 136 mL / OUT: 40 mL / NET: 96 mL        Physical Exam:  	Gen: ill appearing male  	HEENT: Anicteric  	Pulm:  trach+  	CV: S1S2+  	Abd: Soft, +BS       	Ext: trace LE edema B/L  	Neuro: Awake  	Skin: Warm and dry  	Vascular access: L. Femoral catheter      LABS/STUDIES  --------------------------------------------------------------------------------              8.6    14.09 >-----------<  159      [12-28-22 @ 00:40]              27.1     137  |  96  |  104  ----------------------------<  147      [12-28-22 @ 00:40]  4.8   |  23  |  5.50        Ca     8.1     [12-28-22 @ 00:40]      Mg     2.8     [12-28-22 @ 00:40]      Phos  6.7     [12-28-22 @ 00:40]    TPro  6.7  /  Alb  2.9  /  TBili  0.7  /  DBili  x   /  AST  26  /  ALT  21  /  AlkPhos  88  [12-28-22 @ 00:40]      Creatinine Trend:  SCr 5.50 [12-28 @ 00:40]  SCr 4.64 [12-27 @ 00:28]  SCr 5.35 [12-26 @ 00:25]  SCr 5.19 [12-25 @ 15:24]  SCr 4.59 [12-25 @ 00:28]    Urinalysis - [12-26-22 @ 13:19]      Color Yellow / Appearance Clear / SG 1.013 / pH 6.0      Gluc Negative / Ketone Negative  / Bili Negative / Urobili Negative       Blood Large / Protein 100 mg/dL / Leuk Est Moderate / Nitrite Negative      RBC >50 / WBC 35 / Hyaline 1 / Gran  / Sq Epi  / Non Sq Epi 2 / Bacteria Few      TSH 2.84      [12-10-22 @ 15:55]  Lipid: chol --, , HDL --, LDL --      [12-05-22 @ 00:50]    HBsAb 889.7      [12-20-22 @ 11:25]  HBcAb Reactive      [12-20-22 @ 11:25]  HCV 0.15, Nonreact      [12-20-22 @ 11:25]     [facility] DIVISION OF KIDNEY DISEASES AND HYPERTENSION -- FOLLOW UP NOTE  --------------------------------------------------------------------------------  Chief Complaint:Acute pancreatitis without infection or necrosis    HPI:  63 y/o male with PMH of CABG, DM, HTN, HLD presenting as transfer from [place] for c/f STEMI. Course c/b gallstone pancreatitis leading to ARDS and shock & cardiac arrest now on [facility] ECMO. Had significant troponin elevation and his LVEF down to 8%. Pt was deemed to be too unstable to have an angiogram and potential PCI due to his co-morbidities & a new subdural bleed. Pt being seen for ERIC. SCr on arrival was 2.15; trended down to hector 1.6 on 11/25 and eventually increased to 3.95 11/28. Pt received IV diuretics as per CTU. Pt initiated on CRRT on 12/5/22 to optimize volume status and electrolytes.  Pt underwent decannulation of ECMO on 12/8/22. Pt was restarted on 12/9/22 for volume overload. Pt underwent mitral clip OR (12/16/22).  s/p trach on 12/16     HPI: Pt. seen this AM. For HD today      PAST HISTORY  --------------------------------------------------------------------------------  No significant changes to PMH, PSH, FHx, SHx, unless otherwise noted    ALLERGIES & MEDICATIONS  --------------------------------------------------------------------------------  Allergies    No Known Allergies    Intolerances      Standing Inpatient Medications  aMIOdarone    Tablet 200 milliGRAM(s) Oral daily  aspirin  chewable 81 milliGRAM(s) Oral daily  atorvastatin 80 milliGRAM(s) Oral at bedtime  caspofungin IVPB 50 milliGRAM(s) IV Intermittent every 24 hours  caspofungin IVPB      chlorhexidine 0.12% Liquid 15 milliLiter(s) Oral Mucosa every 12 hours  chlorhexidine 2% Cloths 1 Application(s) Topical <User Schedule>  dextrose 50% Injectable 50 milliLiter(s) IV Push every 15 minutes  heparin   Injectable 5000 Unit(s) SubCutaneous every 8 hours  insulin regular Infusion 2 Unit(s)/Hr IV Continuous <Continuous>  meropenem  IVPB 500 milliGRAM(s) IV Intermittent <User Schedule>  multivitamin/minerals/iron Oral Solution (CENTRUM) 15 milliLiter(s) Oral daily  pantoprazole  Injectable 40 milliGRAM(s) IV Push daily  senna 2 Tablet(s) Oral at bedtime  thiamine 100 milliGRAM(s) Enteral Tube daily    PRN Inpatient Medications  acetaminophen    Suspension .. 650 milliGRAM(s) Oral every 6 hours PRN  sodium chloride 0.9% lock flush 10 milliLiter(s) IV Push every 1 hour PRN      REVIEW OF SYSTEMS  --------------------------------------------------------------------------------  Unable to obtain    VITALS/PHYSICAL EXAM  --------------------------------------------------------------------------------  T(C): 36.8 (12-28-22 @ 08:00), Max: 37.6 (12-27-22 @ 20:00)  HR: 104 (12-28-22 @ 08:00) (99 - 114)  BP: --  RR: 37 (12-28-22 @ 08:00) (14 - 37)  SpO2: 100% (12-28-22 @ 08:00) (94% - 100%)  Wt(kg): --        12-27-22 @ 07:01  -  12-28-22 @ 07:00  --------------------------------------------------------  IN: 2112 mL / OUT: 710 mL / NET: 1402 mL    12-28-22 @ 07:01  -  12-28-22 @ 08:49  --------------------------------------------------------  IN: 136 mL / OUT: 40 mL / NET: 96 mL        Physical Exam:  	Gen: ill appearing male  	HEENT: Anicteric  	Pulm:  trach+  	CV: S1S2+  	Abd: Soft, +BS       	Ext: trace LE edema B/L  	Neuro: Awake  	Skin: Warm and dry  	Vascular access: L. Femoral catheter      LABS/STUDIES  --------------------------------------------------------------------------------              8.6    14.09 >-----------<  159      [12-28-22 @ 00:40]              27.1     137  |  96  |  104  ----------------------------<  147      [12-28-22 @ 00:40]  4.8   |  23  |  5.50        Ca     8.1     [12-28-22 @ 00:40]      Mg     2.8     [12-28-22 @ 00:40]      Phos  6.7     [12-28-22 @ 00:40]    TPro  6.7  /  Alb  2.9  /  TBili  0.7  /  DBili  x   /  AST  26  /  ALT  21  /  AlkPhos  88  [12-28-22 @ 00:40]      Creatinine Trend:  SCr 5.50 [12-28 @ 00:40]  SCr 4.64 [12-27 @ 00:28]  SCr 5.35 [12-26 @ 00:25]  SCr 5.19 [12-25 @ 15:24]  SCr 4.59 [12-25 @ 00:28]    Urinalysis - [12-26-22 @ 13:19]      Color Yellow / Appearance Clear / SG 1.013 / pH 6.0      Gluc Negative / Ketone Negative  / Bili Negative / Urobili Negative       Blood Large / Protein 100 mg/dL / Leuk Est Moderate / Nitrite Negative      RBC >50 / WBC 35 / Hyaline 1 / Gran  / Sq Epi  / Non Sq Epi 2 / Bacteria Few      TSH 2.84      [12-10-22 @ 15:55]  Lipid: chol --, , HDL --, LDL --      [12-05-22 @ 00:50]    HBsAb 889.7      [12-20-22 @ 11:25]  HBcAb Reactive      [12-20-22 @ 11:25]  HCV 0.15, Nonreact      [12-20-22 @ 11:25]     [facility] DIVISION OF KIDNEY DISEASES AND HYPERTENSION -- FOLLOW UP NOTE  --------------------------------------------------------------------------------  Chief Complaint:Acute pancreatitis without infection or necrosis    HPI:  61 y/o male with PMH of CABG, DM, HTN, HLD presenting as transfer from [place] for c/f STEMI. Course c/b gallstone pancreatitis leading to ARDS and shock & cardiac arrest now on [facility] ECMO. Had significant troponin elevation and his LVEF down to 8%. Pt was deemed to be too unstable to have an angiogram and potential PCI due to his co-morbidities & a new subdural bleed. Pt being seen for ERIC. SCr on arrival was 2.15; trended down to hector 1.6 on 11/25 and eventually increased to 3.95 11/28. Pt received IV diuretics as per CTU. Pt initiated on CRRT on 12/5/22 to optimize volume status and electrolytes.  Pt underwent decannulation of ECMO on 12/8/22. Pt was restarted on 12/9/22 for volume overload. Pt underwent mitral clip OR (12/16/22).  s/p trach on 12/16     HPI: Pt. seen this AM. For HD today      PAST HISTORY  --------------------------------------------------------------------------------  No significant changes to PMH, PSH, FHx, SHx, unless otherwise noted    ALLERGIES & MEDICATIONS  --------------------------------------------------------------------------------  Allergies    No Known Allergies    Intolerances      Standing Inpatient Medications  aMIOdarone    Tablet 200 milliGRAM(s) Oral daily  aspirin  chewable 81 milliGRAM(s) Oral daily  atorvastatin 80 milliGRAM(s) Oral at bedtime  caspofungin IVPB 50 milliGRAM(s) IV Intermittent every 24 hours  caspofungin IVPB      chlorhexidine 0.12% Liquid 15 milliLiter(s) Oral Mucosa every 12 hours  chlorhexidine 2% Cloths 1 Application(s) Topical <User Schedule>  dextrose 50% Injectable 50 milliLiter(s) IV Push every 15 minutes  heparin   Injectable 5000 Unit(s) SubCutaneous every 8 hours  insulin regular Infusion 2 Unit(s)/Hr IV Continuous <Continuous>  meropenem  IVPB 500 milliGRAM(s) IV Intermittent <User Schedule>  multivitamin/minerals/iron Oral Solution (CENTRUM) 15 milliLiter(s) Oral daily  pantoprazole  Injectable 40 milliGRAM(s) IV Push daily  senna 2 Tablet(s) Oral at bedtime  thiamine 100 milliGRAM(s) Enteral Tube daily    PRN Inpatient Medications  acetaminophen    Suspension .. 650 milliGRAM(s) Oral every 6 hours PRN  sodium chloride 0.9% lock flush 10 milliLiter(s) IV Push every 1 hour PRN      REVIEW OF SYSTEMS  --------------------------------------------------------------------------------  Unable to obtain    VITALS/PHYSICAL EXAM  --------------------------------------------------------------------------------  T(C): 36.8 (12-28-22 @ 08:00), Max: 37.6 (12-27-22 @ 20:00)  HR: 104 (12-28-22 @ 08:00) (99 - 114)  BP: --  RR: 37 (12-28-22 @ 08:00) (14 - 37)  SpO2: 100% (12-28-22 @ 08:00) (94% - 100%)  Wt(kg): --        12-27-22 @ 07:01  -  12-28-22 @ 07:00  --------------------------------------------------------  IN: 2112 mL / OUT: 710 mL / NET: 1402 mL    12-28-22 @ 07:01  -  12-28-22 @ 08:49  --------------------------------------------------------  IN: 136 mL / OUT: 40 mL / NET: 96 mL        Physical Exam:  	Gen: ill appearing male  	HEENT: Anicteric  	Pulm:  trach+  	CV: S1S2+  	Abd: Soft, +BS       	Ext: trace LE edema B/L  	Neuro: Awake  	Skin: Warm and dry  	Vascular access: L. Femoral catheter      LABS/STUDIES  --------------------------------------------------------------------------------              8.6    14.09 >-----------<  159      [12-28-22 @ 00:40]              27.1     137  |  96  |  104  ----------------------------<  147      [12-28-22 @ 00:40]  4.8   |  23  |  5.50        Ca     8.1     [12-28-22 @ 00:40]      Mg     2.8     [12-28-22 @ 00:40]      Phos  6.7     [12-28-22 @ 00:40]    TPro  6.7  /  Alb  2.9  /  TBili  0.7  /  DBili  x   /  AST  26  /  ALT  21  /  AlkPhos  88  [12-28-22 @ 00:40]      Creatinine Trend:  SCr 5.50 [12-28 @ 00:40]  SCr 4.64 [12-27 @ 00:28]  SCr 5.35 [12-26 @ 00:25]  SCr 5.19 [12-25 @ 15:24]  SCr 4.59 [12-25 @ 00:28]    Urinalysis - [12-26-22 @ 13:19]      Color Yellow / Appearance Clear / SG 1.013 / pH 6.0      Gluc Negative / Ketone Negative  / Bili Negative / Urobili Negative       Blood Large / Protein 100 mg/dL / Leuk Est Moderate / Nitrite Negative      RBC >50 / WBC 35 / Hyaline 1 / Gran  / Sq Epi  / Non Sq Epi 2 / Bacteria Few      TSH 2.84      [12-10-22 @ 15:55]  Lipid: chol --, , HDL --, LDL --      [12-05-22 @ 00:50]    HBsAb 889.7      [12-20-22 @ 11:25]  HBcAb Reactive      [12-20-22 @ 11:25]  HCV 0.15, Nonreact      [12-20-22 @ 11:25]

## 2022-12-28 NOTE — PROGRESS NOTE ADULT - PROBLEM SELECTOR PLAN 2
Hgb below goal, Anemia workup  Check iron studies    If you have any questions, please feel free to contact me  Arsalan Cochran  Nephrology Fellow  [phone]; Prefer [employer] TEAMS  (After 5pm or on weekends please page the on-call fellow) Hgb below goal, Anemia workup  Check iron studies    If you have any questions, please feel free to contact me  Arsalan Cochran  Nephrology Fellow  [phone]; Prefer [employer] TEAMS  (After 5pm or on weekends please page the on-call fellow) Hgb below goal, Anemia workup  Check iron studies    If you have any questions, please feel free to contact me  Arsalan Cochran  Nephrology Fellow  [phone]; Prefer [employer] TEAMS  (After 5pm or on weekends please page the on-call fellow)

## 2022-12-29 LAB
ALBUMIN SERPL ELPH-MCNC: 2.9 G/DL — LOW (ref 3.3–5)
ALP SERPL-CCNC: 88 U/L — SIGNIFICANT CHANGE UP (ref 40–120)
ALT FLD-CCNC: 21 U/L — SIGNIFICANT CHANGE UP (ref 10–45)
ANION GAP SERPL CALC-SCNC: 16 MMOL/L — SIGNIFICANT CHANGE UP (ref 5–17)
APTT BLD: 31.2 SEC — SIGNIFICANT CHANGE UP (ref 27.5–35.5)
AST SERPL-CCNC: 30 U/L — SIGNIFICANT CHANGE UP (ref 10–40)
BILIRUB SERPL-MCNC: 0.8 MG/DL — SIGNIFICANT CHANGE UP (ref 0.2–1.2)
BUN SERPL-MCNC: 84 MG/DL — HIGH (ref 7–23)
CALCIUM SERPL-MCNC: 8 MG/DL — LOW (ref 8.4–10.5)
CHLORIDE SERPL-SCNC: 96 MMOL/L — SIGNIFICANT CHANGE UP (ref 96–108)
CO2 SERPL-SCNC: 25 MMOL/L — SIGNIFICANT CHANGE UP (ref 22–31)
CREAT SERPL-MCNC: 4.64 MG/DL — HIGH (ref 0.5–1.3)
EGFR: 13 ML/MIN/1.73M2 — LOW
FUNGITELL: 31 PG/ML — SIGNIFICANT CHANGE UP
GAS PNL BLDA: SIGNIFICANT CHANGE UP
GLUCOSE BLDC GLUCOMTR-MCNC: 130 MG/DL — HIGH (ref 70–99)
GLUCOSE BLDC GLUCOMTR-MCNC: 131 MG/DL — HIGH (ref 70–99)
GLUCOSE BLDC GLUCOMTR-MCNC: 132 MG/DL — HIGH (ref 70–99)
GLUCOSE BLDC GLUCOMTR-MCNC: 133 MG/DL — HIGH (ref 70–99)
GLUCOSE BLDC GLUCOMTR-MCNC: 140 MG/DL — HIGH (ref 70–99)
GLUCOSE BLDC GLUCOMTR-MCNC: 141 MG/DL — HIGH (ref 70–99)
GLUCOSE BLDC GLUCOMTR-MCNC: 148 MG/DL — HIGH (ref 70–99)
GLUCOSE BLDC GLUCOMTR-MCNC: 149 MG/DL — HIGH (ref 70–99)
GLUCOSE BLDC GLUCOMTR-MCNC: 150 MG/DL — HIGH (ref 70–99)
GLUCOSE BLDC GLUCOMTR-MCNC: 188 MG/DL — HIGH (ref 70–99)
GLUCOSE SERPL-MCNC: 124 MG/DL — HIGH (ref 70–99)
HCT VFR BLD CALC: 26.8 % — LOW (ref 39–50)
HGB BLD-MCNC: 8.4 G/DL — LOW (ref 13–17)
INR BLD: 1.06 RATIO — SIGNIFICANT CHANGE UP (ref 0.88–1.16)
MAGNESIUM SERPL-MCNC: 2.6 MG/DL — SIGNIFICANT CHANGE UP (ref 1.6–2.6)
MCHC RBC-ENTMCNC: 29.4 PG — SIGNIFICANT CHANGE UP (ref 27–34)
MCHC RBC-ENTMCNC: 31.3 GM/DL — LOW (ref 32–36)
MCV RBC AUTO: 93.7 FL — SIGNIFICANT CHANGE UP (ref 80–100)
NRBC # BLD: 0 /100 WBCS — SIGNIFICANT CHANGE UP (ref 0–0)
PHOSPHATE SERPL-MCNC: 5.8 MG/DL — HIGH (ref 2.5–4.5)
PLATELET # BLD AUTO: 170 K/UL — SIGNIFICANT CHANGE UP (ref 150–400)
POTASSIUM SERPL-MCNC: 4.5 MMOL/L — SIGNIFICANT CHANGE UP (ref 3.5–5.3)
POTASSIUM SERPL-SCNC: 4.5 MMOL/L — SIGNIFICANT CHANGE UP (ref 3.5–5.3)
PREALB SERPL-MCNC: 17 MG/DL — LOW (ref 20–40)
PROT SERPL-MCNC: 6.9 G/DL — SIGNIFICANT CHANGE UP (ref 6–8.3)
PROTHROM AB SERPL-ACNC: 12.2 SEC — SIGNIFICANT CHANGE UP (ref 10.5–13.4)
RBC # BLD: 2.86 M/UL — LOW (ref 4.2–5.8)
RBC # FLD: 18.1 % — HIGH (ref 10.3–14.5)
SODIUM SERPL-SCNC: 137 MMOL/L — SIGNIFICANT CHANGE UP (ref 135–145)
WBC # BLD: 10.33 K/UL — SIGNIFICANT CHANGE UP (ref 3.8–10.5)
WBC # FLD AUTO: 10.33 K/UL — SIGNIFICANT CHANGE UP (ref 3.8–10.5)

## 2022-12-29 PROCEDURE — 74018 RADEX ABDOMEN 1 VIEW: CPT | Mod: 26

## 2022-12-29 PROCEDURE — 99233 SBSQ HOSP IP/OBS HIGH 50: CPT | Mod: GC

## 2022-12-29 PROCEDURE — 99291 CRITICAL CARE FIRST HOUR: CPT

## 2022-12-29 PROCEDURE — 71045 X-RAY EXAM CHEST 1 VIEW: CPT | Mod: 26

## 2022-12-29 PROCEDURE — 93308 TTE F-UP OR LMTD: CPT | Mod: 26

## 2022-12-29 PROCEDURE — 93321 DOPPLER ECHO F-UP/LMTD STD: CPT | Mod: 26

## 2022-12-29 PROCEDURE — 99232 SBSQ HOSP IP/OBS MODERATE 35: CPT

## 2022-12-29 PROCEDURE — 99233 SBSQ HOSP IP/OBS HIGH 50: CPT

## 2022-12-29 RX ORDER — HUMAN INSULIN 100 [IU]/ML
5 INJECTION, SUSPENSION SUBCUTANEOUS EVERY 6 HOURS
Refills: 0 | Status: DISCONTINUED | OUTPATIENT
Start: 2022-12-29 | End: 2022-12-30

## 2022-12-29 RX ORDER — SODIUM CHLORIDE 9 MG/ML
1000 INJECTION, SOLUTION INTRAVENOUS
Refills: 0 | Status: DISCONTINUED | OUTPATIENT
Start: 2022-12-29 | End: 2022-12-31

## 2022-12-29 RX ORDER — SODIUM CHLORIDE 9 MG/ML
1000 INJECTION, SOLUTION INTRAVENOUS
Refills: 0 | Status: DISCONTINUED | OUTPATIENT
Start: 2022-12-29 | End: 2023-01-07

## 2022-12-29 RX ORDER — INSULIN LISPRO 100/ML
VIAL (ML) SUBCUTANEOUS EVERY 6 HOURS
Refills: 0 | Status: DISCONTINUED | OUTPATIENT
Start: 2022-12-29 | End: 2022-12-30

## 2022-12-29 RX ORDER — CALCIUM GLUCONATE 100 MG/ML
2 VIAL (ML) INTRAVENOUS ONCE
Refills: 0 | Status: COMPLETED | OUTPATIENT
Start: 2022-12-29 | End: 2022-12-29

## 2022-12-29 RX ORDER — FUROSEMIDE 40 MG
100 TABLET ORAL ONCE
Refills: 0 | Status: COMPLETED | OUTPATIENT
Start: 2022-12-29 | End: 2022-12-29

## 2022-12-29 RX ORDER — DEXTROSE 50 % IN WATER 50 %
15 SYRINGE (ML) INTRAVENOUS ONCE
Refills: 0 | Status: DISCONTINUED | OUTPATIENT
Start: 2022-12-29 | End: 2023-01-07

## 2022-12-29 RX ORDER — GLUCAGON INJECTION, SOLUTION 0.5 MG/.1ML
1 INJECTION, SOLUTION SUBCUTANEOUS ONCE
Refills: 0 | Status: DISCONTINUED | OUTPATIENT
Start: 2022-12-29 | End: 2023-01-07

## 2022-12-29 RX ORDER — ALBUMIN HUMAN 25 %
250 VIAL (ML) INTRAVENOUS ONCE
Refills: 0 | Status: COMPLETED | OUTPATIENT
Start: 2022-12-29 | End: 2022-12-29

## 2022-12-29 RX ADMIN — HEPARIN SODIUM 5000 UNIT(S): 5000 INJECTION INTRAVENOUS; SUBCUTANEOUS at 19:11

## 2022-12-29 RX ADMIN — HEPARIN SODIUM 5000 UNIT(S): 5000 INJECTION INTRAVENOUS; SUBCUTANEOUS at 01:16

## 2022-12-29 RX ADMIN — Medication 200 GRAM(S): at 21:13

## 2022-12-29 RX ADMIN — CHLORHEXIDINE GLUCONATE 15 MILLILITER(S): 213 SOLUTION TOPICAL at 19:16

## 2022-12-29 RX ADMIN — HEPARIN SODIUM 5000 UNIT(S): 5000 INJECTION INTRAVENOUS; SUBCUTANEOUS at 08:09

## 2022-12-29 RX ADMIN — Medication 125 MILLILITER(S): at 04:27

## 2022-12-29 RX ADMIN — CHLORHEXIDINE GLUCONATE 15 MILLILITER(S): 213 SOLUTION TOPICAL at 06:01

## 2022-12-29 RX ADMIN — Medication 120 MILLIGRAM(S): at 13:00

## 2022-12-29 RX ADMIN — PANTOPRAZOLE SODIUM 40 MILLIGRAM(S): 20 TABLET, DELAYED RELEASE ORAL at 13:10

## 2022-12-29 RX ADMIN — Medication 81 MILLIGRAM(S): at 13:09

## 2022-12-29 RX ADMIN — Medication 15 MILLILITER(S): at 13:10

## 2022-12-29 RX ADMIN — ATORVASTATIN CALCIUM 80 MILLIGRAM(S): 80 TABLET, FILM COATED ORAL at 22:10

## 2022-12-29 RX ADMIN — HUMAN INSULIN 5 UNIT(S): 100 INJECTION, SUSPENSION SUBCUTANEOUS at 14:00

## 2022-12-29 RX ADMIN — CASPOFUNGIN ACETATE 260 MILLIGRAM(S): 7 INJECTION, POWDER, LYOPHILIZED, FOR SOLUTION INTRAVENOUS at 19:16

## 2022-12-29 RX ADMIN — MEROPENEM 100 MILLIGRAM(S): 1 INJECTION INTRAVENOUS at 22:10

## 2022-12-29 RX ADMIN — AMIODARONE HYDROCHLORIDE 200 MILLIGRAM(S): 400 TABLET ORAL at 06:01

## 2022-12-29 RX ADMIN — Medication 100 MILLIGRAM(S): at 13:09

## 2022-12-29 RX ADMIN — CHLORHEXIDINE GLUCONATE 1 APPLICATION(S): 213 SOLUTION TOPICAL at 06:23

## 2022-12-29 NOTE — PROGRESS NOTE ADULT - ATTENDING COMMENTS
ERIC/ATN, CKD III (apparent baseline Cr 1.5)  Required CRRT, now transitioned to intermittent hemodialysis   mL, kidney function remains poor  Some edema; as discussed with CTU team, will perform additional UF today to facilitate vent weaning and cardiac recovery    - Diuretics for volume management - currently non-oliguric  - Ultrafiltration today for volume management  - Hemodialysis tomorrow for volume management and clearance  - Dose meds for HD/eGFR<10  - Maintain MAP >65  - Not ESRD (still acute); would not proceed with AVF creation at this time  - Remainder per fellow, will follow

## 2022-12-29 NOTE — PROGRESS NOTE ADULT - SUBJECTIVE AND OBJECTIVE BOX
Subjective:  - no acute events overnight. last HD   - OOB in chair on trach collar. Denies SOB    Medications:  acetaminophen    Suspension .. 650 milliGRAM(s) Oral every 6 hours PRN  aMIOdarone    Tablet 200 milliGRAM(s) Oral daily  aspirin  chewable 81 milliGRAM(s) Oral daily  atorvastatin 80 milliGRAM(s) Oral at bedtime  caspofungin IVPB 50 milliGRAM(s) IV Intermittent every 24 hours  caspofungin IVPB      chlorhexidine 0.12% Liquid 15 milliLiter(s) Oral Mucosa every 12 hours  chlorhexidine 2% Cloths 1 Application(s) Topical <User Schedule>  dextrose 5%. 1000 milliLiter(s) IV Continuous <Continuous>  dextrose 5%. 1000 milliLiter(s) IV Continuous <Continuous>  dextrose 50% Injectable 50 milliLiter(s) IV Push every 15 minutes  dextrose Oral Gel 15 Gram(s) Oral once PRN  glucagon  Injectable 1 milliGRAM(s) IntraMuscular once  heparin   Injectable 5000 Unit(s) SubCutaneous every 8 hours  insulin lispro (ADMELOG) corrective regimen sliding scale   SubCutaneous every 6 hours  insulin NPH human recombinant 5 Unit(s) SubCutaneous every 6 hours  insulin regular Infusion 2 Unit(s)/Hr IV Continuous <Continuous>  meropenem  IVPB 500 milliGRAM(s) IV Intermittent <User Schedule>  multivitamin/minerals/iron Oral Solution (CENTRUM) 15 milliLiter(s) Oral daily  pantoprazole  Injectable 40 milliGRAM(s) IV Push daily  senna 2 Tablet(s) Oral at bedtime  sodium chloride 0.9% lock flush 10 milliLiter(s) IV Push every 1 hour PRN  thiamine 100 milliGRAM(s) Enteral Tube daily      Physical Exam:    Vitals:  Vital Signs Last 24 Hours  T(C): 36.7 (22 @ 12:00), Max: 37.1 (22 @ 20:00)  HR: 102 (22 @ 14:00) (98 - 107)  BP: 92//44  RR: 5 (22 @ 14:00) (5 - 35)  SpO2: 100% (22 @ 14:00) (95% - 100%) on TC 50%    Weight in k.2 ( @ 00:00)    I&O's Summary    28 Dec 2022 07:01  -  29 Dec 2022 07:00  --------------------------------------------------------  IN: 2649.5 mL / OUT: 3130 mL / NET: -480.5 mL    29 Dec 2022 07:01  -  29 Dec 2022 15:06  --------------------------------------------------------  IN: 535 mL / OUT: 400 mL / NET: 135 mL    Tele: SR-ST    General: No distress. Comfortable.  HEENT: EOM intact. Trach midline with TC in place. Small amount of thick creamy/tan secretions from trach noted, NGT in place with TF running  Neck: Neck supple. JVP difficult to assess given trach ties. No masses  Chest: Coarse rhonchi bilaterally  CV: Normal S1 and S2. No murmurs, rub, or gallops. Radial pulses normal.  Abdomen: Soft, non-distended, non-tender, normoactive BS  Skin: No rashes or skin breakdown  Neurology: Alert and following commands. Sensation intact  Psych: Affect normal    Labs:                        8.4    10.33 )-----------( 170      ( 29 Dec 2022 00:35 )             26.8     12    137  |  96  |  84<H>  ----------------------------<  124<H>  4.5   |  25  |  4.64<H>    Ca    8.0<L>      29 Dec 2022 00:35  Phos  5.8       Mg     2.6         TPro  6.9  /  Alb  2.9<L>  /  TBili  0.8  /  DBili  x   /  AST  30  /  ALT  21  /  AlkPhos  88  12    PT/INR - ( 29 Dec 2022 00:35 )   PT: 12.2 sec;   INR: 1.06 ratio      PTT - ( 29 Dec 2022 00:35 )  PTT:31.2 sec

## 2022-12-29 NOTE — PROGRESS NOTE ADULT - PROBLEM SELECTOR PLAN 1
Pt with non oliguric ERIC/ ATN in the setting of STEMI, cardiac arrest & cardiogenic shock  SCr on arrival was 2.15; trended down to hector 1.6 on 11/25; slowly trended up & increased to 3.95 11/28.   Pt initiated on CRRT/CVVHDF to optimize volume and electrolytes on 12/5/22. Pt likely with ATN. Pt underwent decannulation of ECMO on 12/8/22 and was taken off CRRT. Pt reinitiated on CRRT overnight on 12/9/22 for volume overload. s/p trach on 12/16. CRRT stopped again 12/19. Bladder scan daily. Now with [facility]. Off Lasix gtt.   No plan for HD today.   Will need tunneled catheter placed for ongoing dialysis needs once cleared from infectious standpoint.     Monitor labs and urine output. Avoid any potential nephrotoxins. Dose medications as per HD.

## 2022-12-29 NOTE — PROGRESS NOTE ADULT - PROBLEM SELECTOR PLAN 4
- improved  - CT abd 11/24 showing acute pancreatitis, RUQ showing sludge, no stones  - lipase > 3000 11/24, normalized prior and now fluctuating  - followed by GI and surgery with no plan for intervention

## 2022-12-29 NOTE — PROGRESS NOTE ADULT - ASSESSMENT
61 YO M with a history of CAD s/p 4v CABG ~2019 in [facility], DM2 (A1c 7.3%), and HTN who initially presented to OSH with abdominal pain and n/v and found to have pancreatitis with lipase > 3000 though also with elevated troponins. CT abdomen revealed acute pancreatitis and his initial LVEF was 40-45%. He developed MSOF with hypoxic respiratory failure requiring intubation and ERIC and went into hypoxic PEA arrest requiring ACLS and due to persistent hypoxia and hypotension on pressors after ROSC was cannulated to [facility] ECMO (LFV, RFA, no anterograde perfusion catheter) on 11/25. Notably CTH that day revealed small subdural hemorrhage.     His post arrest TTE on 11/26 showed a signficantly worse LVEF of 5-10% with an AV that was only minimally opening. Since then he has had improvement in his LV function to ~35-40% and improved pulsatility while tolerating progressive slow ECMO weans. ECMO turndown (note in chart 11/30) was reassuring for ability to decannulate to IABP/inotropes. LHC 12/06 showed closure of his 3 vein grafts with graft to LAD patent though with distal native disease. No coronary intervention was done. IABP placed, ECMO successfully decannulated 12/08 (transfused 2u pRBCs during). His ARDS has improved and he's now s/p trach. He's now off IABP, removed 12/17, and inotropes, however has previously been unable to tolerate GDMT due to soft BPs and renal failure requiring dialysis. 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 He's afebrile with improving leukocytosis. He has urine output but not enough to stay euvolemic. Remains on intermittent HD. He has evidence of volume overload on exam and CXR. His BPs are borderline, but slightly improved today. Will attempt to gradually introduce HF GDMT.    Previous cardiac studies:  TTE 12/19/22 - EF 24%, LVIDd 5.6, normal RV function, estimated pasp 45mmhg  LHC (12/06/22) - patent LIMA-LAD, RCA , LCx , aortogram w/ closure of 3 vein grafts, LVEDP 22 mm Hg, left-to-left and left-to-right collaterals  TTE (12/03/22) - mod-to-sev segmental LVSD (inferolateral, inferior, inferoseptal hypokinesis) 61 YO M with a history of CAD s/p 4v CABG ~2019 in [facility], DM2 (A1c 7.3%), and HTN who initially presented to OSH with abdominal pain and n/v and found to have pancreatitis with lipase > 3000 though also with elevated troponins. CT abdomen revealed acute pancreatitis and his initial LVEF was 40-45%. He developed MSOF with hypoxic respiratory failure requiring intubation and ERIC and went into hypoxic PEA arrest requiring ACLS and due to persistent hypoxia and hypotension on pressors after ROSC was cannulated to [facility] ECMO (LFV, RFA, no anterograde perfusion catheter) on 11/25. Notably CTH that day revealed small subdural hemorrhage.     His post arrest TTE on 11/26 showed a signficantly worse LVEF of 5-10% with an AV that was only minimally opening. Since then he has had improvement in his LV function to ~35-40% and improved pulsatility while tolerating progressive slow ECMO weans. ECMO turndown (note in chart 11/30) was reassuring for ability to decannulate to IABP/inotropes. LHC 12/06 showed closure of his 3 vein grafts with graft to LAD patent though with distal native disease. No coronary intervention was done. IABP placed, ECMO successfully decannulated 12/08 (transfused 2u pRBCs during). His ARDS has improved and he's now s/p trach. He's now off IABP, removed 12/17, and inotropes, however has previously been unable to tolerate GDMT due to soft BPs and renal failure requiring dialysis. 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 He's afebrile with improving leukocytosis. He has urine output but not enough to stay euvolemic. Remains on intermittent HD. He has evidence of volume overload on exam and CXR. His BPs are borderline, but slightly improved today. Will attempt to gradually introduce HF GDMT.    Previous cardiac studies:  TTE 12/19/22 - EF 24%, LVIDd 5.6, normal RV function, estimated pasp 45mmhg  LHC (12/06/22) - patent LIMA-LAD, RCA , LCx , aortogram w/ closure of 3 vein grafts, LVEDP 22 mm Hg, left-to-left and left-to-right collaterals  TTE (12/03/22) - mod-to-sev segmental LVSD (inferolateral, inferior, inferoseptal hypokinesis) 63 YO M with a history of CAD s/p 4v CABG ~2019 in [facility], DM2 (A1c 7.3%), and HTN who initially presented to OSH with abdominal pain and n/v and found to have pancreatitis with lipase > 3000 though also with elevated troponins. CT abdomen revealed acute pancreatitis and his initial LVEF was 40-45%. He developed MSOF with hypoxic respiratory failure requiring intubation and ERIC and went into hypoxic PEA arrest requiring ACLS and due to persistent hypoxia and hypotension on pressors after ROSC was cannulated to [facility] ECMO (LFV, RFA, no anterograde perfusion catheter) on 11/25. Notably CTH that day revealed small subdural hemorrhage.     His post arrest TTE on 11/26 showed a signficantly worse LVEF of 5-10% with an AV that was only minimally opening. Since then he has had improvement in his LV function to ~35-40% and improved pulsatility while tolerating progressive slow ECMO weans. ECMO turndown (note in chart 11/30) was reassuring for ability to decannulate to IABP/inotropes. LHC 12/06 showed closure of his 3 vein grafts with graft to LAD patent though with distal native disease. No coronary intervention was done. IABP placed, ECMO successfully decannulated 12/08 (transfused 2u pRBCs during). His ARDS has improved and he's now s/p trach. He's now off IABP, removed 12/17, and inotropes, however has previously been unable to tolerate GDMT due to soft BPs and renal failure requiring dialysis. 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 He's afebrile with improving leukocytosis. He has urine output but not enough to stay euvolemic. Remains on intermittent HD. He has evidence of volume overload on exam and CXR. His BPs are borderline, but slightly improved today. Will attempt to gradually introduce HF GDMT.    Previous cardiac studies:  TTE 12/19/22 - EF 24%, LVIDd 5.6, normal RV function, estimated pasp 45mmhg  LHC (12/06/22) - patent LIMA-LAD, RCA , LCx , aortogram w/ closure of 3 vein grafts, LVEDP 22 mm Hg, left-to-left and left-to-right collaterals  TTE (12/03/22) - mod-to-sev segmental LVSD (inferolateral, inferior, inferoseptal hypokinesis)

## 2022-12-29 NOTE — PROGRESS NOTE ADULT - SUBJECTIVE AND OBJECTIVE BOX
Follow Up:  candidemia    Interval History/ROS:  Overnight: No acute events.  Patient remains afebrile, otherwise hemodynamically stable on baseline oxygen requirements.  Latest labs show no leukocytosis, CMP with elevated creatinine in setting of ESRD, hepatic function within normal limits.  Repeat blood cultures obtained on 12/27/2022 remain negative to date.  Latest chest x-ray on 12/29/2022 shows decrease in pulmonary congestion, left lower lobe opacity noted.    Patient seen examined at bedside.  Family at bedside.  No new pain or discomfort.      Allergies  No Known Allergies        ANTIMICROBIALS:  caspofungin IVPB 50 every 24 hours  caspofungin IVPB    meropenem  IVPB 500 <User Schedule>      OTHER MEDS:  MEDICATIONS  (STANDING):  acetaminophen    Suspension .. 650 every 6 hours PRN  aMIOdarone    Tablet 200 daily  aspirin  chewable 81 daily  atorvastatin 80 at bedtime  dextrose 50% Injectable 50 every 15 minutes  dextrose Oral Gel 15 once PRN  furosemide   IVPB 100 once  glucagon  Injectable 1 once  heparin   Injectable 5000 every 8 hours  insulin lispro (ADMELOG) corrective regimen sliding scale  every 6 hours  insulin NPH human recombinant 5 every 6 hours  insulin regular Infusion 2 <Continuous>  metolazone 10 once  pantoprazole  Injectable 40 daily  senna 2 at bedtime      Vital Signs Last 24 Hrs  T(C): 36.6 (29 Dec 2022 08:00), Max: 37.1 (28 Dec 2022 20:00)  T(F): 97.8 (29 Dec 2022 08:00), Max: 98.7 (28 Dec 2022 20:00)  HR: 101 (29 Dec 2022 09:00) (98 - 107)  BP: --  BP(mean): --  RR: 25 (29 Dec 2022 09:00) (12 - 35)  SpO2: 99% (29 Dec 2022 09:00) (95% - 100%)    Parameters below as of 29 Dec 2022 08:15  Patient On (Oxygen Delivery Method): tracheostomy collar  O2 Flow (L/min): 10  O2 Concentration (%): 50    PHYSICAL EXAM:  Ear/Nose/Throat: no oral lesion, no sinus tenderness on percussion	  Neck:no JVD, no lymphadenopathy, supple  Respiratory: CTA lore  Cardiovascular: S1S2 RRR, no murmurs  Gastrointestinal:soft, (+) BS, no HSM  Extremities:no e/e/c                            8.4    10.33 )-----------( 170      ( 29 Dec 2022 00:35 )             26.8       12-29    137  |  96  |  84<H>  ----------------------------<  124<H>  4.5   |  25  |  4.64<H>    Ca    8.0<L>      29 Dec 2022 00:35  Phos  5.8     12-29  Mg     2.6     12-29    TPro  6.9  /  Alb  2.9<L>  /  TBili  0.8  /  DBili  x   /  AST  30  /  ALT  21  /  AlkPhos  88  12-29          MICROBIOLOGY:  v    Culture - Blood (collected 27 Dec 2022 19:30)  Source: .Blood Blood-Peripheral  Preliminary Report (29 Dec 2022 02:02):    No growth to date.    Culture - Blood (collected 27 Dec 2022 16:44)  Source: .Blood Blood-Peripheral  Preliminary Report (29 Dec 2022 02:02):    No growth to date.    Culture - Sputum (collected 26 Dec 2022 11:54)  Source: Trach Asp Tracheal Aspirate  Gram Stain (26 Dec 2022 21:52):    No polymorphonuclear leukocytes per low power field    Few Squamous epithelial cells per low power field    Moderate Gram Positive Rods per oil power field  Final Report (28 Dec 2022 16:24):    Normal Respiratory Christy present    Culture - Blood (collected 26 Dec 2022 10:43)  Source: .Blood Blood  Preliminary Report (27 Dec 2022 17:01):    No growth to date.    Culture - Blood (collected 26 Dec 2022 06:38)  Source: .Blood Blood-Peripheral  Preliminary Report (27 Dec 2022 10:02):    No growth to date.    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    ************************************************************    This PCR assay was performed by multiplex PCR. This    Assay tests for 66 bacterial and resistance gene targets.    Please refer to the [facility] Labs test directory    at [email or web address] for details.  Organism: Blood Culture PCR (27 Dec 2022 19:42)  Organism: Blood Culture PCR (27 Dec 2022 19:42)      -  Candida tropicalis: Detec      Method Type: PCR                    RADIOLOGY:   Follow Up:  candidemia    Interval History/ROS:  Overnight: No acute events.  Patient remains afebrile, otherwise hemodynamically stable on baseline oxygen requirements.  Latest labs show no leukocytosis, CMP with elevated creatinine in setting of ESRD, hepatic function within normal limits.  Repeat blood cultures obtained on 12/27/2022 remain negative to date.  Latest chest x-ray on 12/29/2022 shows decrease in pulmonary congestion, left lower lobe opacity noted.    Patient seen examined at bedside.  Family at bedside.  No new pain or discomfort.      Allergies  No Known Allergies        ANTIMICROBIALS:  caspofungin IVPB 50 every 24 hours  caspofungin IVPB    meropenem  IVPB 500 <User Schedule>      OTHER MEDS:  MEDICATIONS  (STANDING):  acetaminophen    Suspension .. 650 every 6 hours PRN  aMIOdarone    Tablet 200 daily  aspirin  chewable 81 daily  atorvastatin 80 at bedtime  dextrose 50% Injectable 50 every 15 minutes  dextrose Oral Gel 15 once PRN  furosemide   IVPB 100 once  glucagon  Injectable 1 once  heparin   Injectable 5000 every 8 hours  insulin lispro (ADMELOG) corrective regimen sliding scale  every 6 hours  insulin NPH human recombinant 5 every 6 hours  insulin regular Infusion 2 <Continuous>  metolazone 10 once  pantoprazole  Injectable 40 daily  senna 2 at bedtime      Vital Signs Last 24 Hrs  T(C): 36.6 (29 Dec 2022 08:00), Max: 37.1 (28 Dec 2022 20:00)  T(F): 97.8 (29 Dec 2022 08:00), Max: 98.7 (28 Dec 2022 20:00)  HR: 101 (29 Dec 2022 09:00) (98 - 107)  BP: --  BP(mean): --  RR: 25 (29 Dec 2022 09:00) (12 - 35)  SpO2: 99% (29 Dec 2022 09:00) (95% - 100%)    Parameters below as of 29 Dec 2022 08:15  Patient On (Oxygen Delivery Method): tracheostomy collar  O2 Flow (L/min): 10  O2 Concentration (%): 50    PHYSICAL EXAM:  Ear/Nose/Throat: no oral lesion, no sinus tenderness on percussion	  Neck:no JVD, no lymphadenopathy, supple  Respiratory: CTA lore  Cardiovascular: S1S2 RRR, no murmurs  Gastrointestinal:soft, (+) BS, no HSM  Extremities:no e/e/c                            8.4    10.33 )-----------( 170      ( 29 Dec 2022 00:35 )             26.8       12-29    137  |  96  |  84<H>  ----------------------------<  124<H>  4.5   |  25  |  4.64<H>    Ca    8.0<L>      29 Dec 2022 00:35  Phos  5.8     12-29  Mg     2.6     12-29    TPro  6.9  /  Alb  2.9<L>  /  TBili  0.8  /  DBili  x   /  AST  30  /  ALT  21  /  AlkPhos  88  12-29          MICROBIOLOGY:  v    Culture - Blood (collected 27 Dec 2022 19:30)  Source: .Blood Blood-Peripheral  Preliminary Report (29 Dec 2022 02:02):    No growth to date.    Culture - Blood (collected 27 Dec 2022 16:44)  Source: .Blood Blood-Peripheral  Preliminary Report (29 Dec 2022 02:02):    No growth to date.    Culture - Sputum (collected 26 Dec 2022 11:54)  Source: Trach Asp Tracheal Aspirate  Gram Stain (26 Dec 2022 21:52):    No polymorphonuclear leukocytes per low power field    Few Squamous epithelial cells per low power field    Moderate Gram Positive Rods per oil power field  Final Report (28 Dec 2022 16:24):    Normal Respiratory Christy present    Culture - Blood (collected 26 Dec 2022 10:43)  Source: .Blood Blood  Preliminary Report (27 Dec 2022 17:01):    No growth to date.    Culture - Blood (collected 26 Dec 2022 06:38)  Source: .Blood Blood-Peripheral  Preliminary Report (27 Dec 2022 10:02):    No growth to date.    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    ************************************************************    This PCR assay was performed by multiplex PCR. This    Assay tests for 66 bacterial and resistance gene targets.    Please refer to the [facility] Labs test directory    at [email or web address] for details.  Organism: Blood Culture PCR (27 Dec 2022 19:42)  Organism: Blood Culture PCR (27 Dec 2022 19:42)      -  Candida tropicalis: Detec      Method Type: PCR                    RADIOLOGY:   Follow Up:  candidemia    Interval History/ROS:  Overnight: No acute events.  Patient remains afebrile, otherwise hemodynamically stable on baseline oxygen requirements.  Latest labs show no leukocytosis, CMP with elevated creatinine in setting of ESRD, hepatic function within normal limits.  Repeat blood cultures obtained on 12/27/2022 remain negative to date.  Latest chest x-ray on 12/29/2022 shows decrease in pulmonary congestion, left lower lobe opacity noted.    Patient seen examined at bedside.  Family at bedside.  No new pain or discomfort.      Allergies  No Known Allergies        ANTIMICROBIALS:  caspofungin IVPB 50 every 24 hours  caspofungin IVPB    meropenem  IVPB 500 <User Schedule>      OTHER MEDS:  MEDICATIONS  (STANDING):  acetaminophen    Suspension .. 650 every 6 hours PRN  aMIOdarone    Tablet 200 daily  aspirin  chewable 81 daily  atorvastatin 80 at bedtime  dextrose 50% Injectable 50 every 15 minutes  dextrose Oral Gel 15 once PRN  furosemide   IVPB 100 once  glucagon  Injectable 1 once  heparin   Injectable 5000 every 8 hours  insulin lispro (ADMELOG) corrective regimen sliding scale  every 6 hours  insulin NPH human recombinant 5 every 6 hours  insulin regular Infusion 2 <Continuous>  metolazone 10 once  pantoprazole  Injectable 40 daily  senna 2 at bedtime      Vital Signs Last 24 Hrs  T(C): 36.6 (29 Dec 2022 08:00), Max: 37.1 (28 Dec 2022 20:00)  T(F): 97.8 (29 Dec 2022 08:00), Max: 98.7 (28 Dec 2022 20:00)  HR: 101 (29 Dec 2022 09:00) (98 - 107)  BP: --  BP(mean): --  RR: 25 (29 Dec 2022 09:00) (12 - 35)  SpO2: 99% (29 Dec 2022 09:00) (95% - 100%)    Parameters below as of 29 Dec 2022 08:15  Patient On (Oxygen Delivery Method): tracheostomy collar  O2 Flow (L/min): 10  O2 Concentration (%): 50    PHYSICAL EXAM:  Ear/Nose/Throat: no oral lesion, no sinus tenderness on percussion	  Neck:no JVD, no lymphadenopathy, supple  Respiratory: CTA lore  Cardiovascular: S1S2 RRR, no murmurs  Gastrointestinal:soft, (+) BS, no HSM  Extremities:no e/e/c                            8.4    10.33 )-----------( 170      ( 29 Dec 2022 00:35 )             26.8       12-29    137  |  96  |  84<H>  ----------------------------<  124<H>  4.5   |  25  |  4.64<H>    Ca    8.0<L>      29 Dec 2022 00:35  Phos  5.8     12-29  Mg     2.6     12-29    TPro  6.9  /  Alb  2.9<L>  /  TBili  0.8  /  DBili  x   /  AST  30  /  ALT  21  /  AlkPhos  88  12-29          MICROBIOLOGY:  v    Culture - Blood (collected 27 Dec 2022 19:30)  Source: .Blood Blood-Peripheral  Preliminary Report (29 Dec 2022 02:02):    No growth to date.    Culture - Blood (collected 27 Dec 2022 16:44)  Source: .Blood Blood-Peripheral  Preliminary Report (29 Dec 2022 02:02):    No growth to date.    Culture - Sputum (collected 26 Dec 2022 11:54)  Source: Trach Asp Tracheal Aspirate  Gram Stain (26 Dec 2022 21:52):    No polymorphonuclear leukocytes per low power field    Few Squamous epithelial cells per low power field    Moderate Gram Positive Rods per oil power field  Final Report (28 Dec 2022 16:24):    Normal Respiratory Christy present    Culture - Blood (collected 26 Dec 2022 10:43)  Source: .Blood Blood  Preliminary Report (27 Dec 2022 17:01):    No growth to date.    Culture - Blood (collected 26 Dec 2022 06:38)  Source: .Blood Blood-Peripheral  Preliminary Report (27 Dec 2022 10:02):    No growth to date.    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    ************************************************************    This PCR assay was performed by multiplex PCR. This    Assay tests for 66 bacterial and resistance gene targets.    Please refer to the [facility] Labs test directory    at [email or web address] for details.  Organism: Blood Culture PCR (27 Dec 2022 19:42)  Organism: Blood Culture PCR (27 Dec 2022 19:42)      -  Candida tropicalis: Detec      Method Type: PCR                    RADIOLOGY:   Follow Up:  candidemia    Interval History/ROS:  Overnight: No acute events.  Patient remains afebrile, otherwise hemodynamically stable on baseline oxygen requirements.  Latest labs show no leukocytosis, CMP with elevated creatinine in setting of ESRD, hepatic function within normal limits.  Repeat blood cultures obtained on 12/27/2022 remain negative to date.  Latest chest x-ray on 12/29/2022 shows decrease in pulmonary congestion, left lower lobe opacity noted.    Patient seen examined at bedside.  Family at bedside.  No new pain or discomfort. Appears comfortable sitting in chair      Allergies  No Known Allergies        ANTIMICROBIALS:  caspofungin IVPB 50 every 24 hours  caspofungin IVPB    meropenem  IVPB 500 <User Schedule>      OTHER MEDS:  MEDICATIONS  (STANDING):  acetaminophen    Suspension .. 650 every 6 hours PRN  aMIOdarone    Tablet 200 daily  aspirin  chewable 81 daily  atorvastatin 80 at bedtime  dextrose 50% Injectable 50 every 15 minutes  dextrose Oral Gel 15 once PRN  furosemide   IVPB 100 once  glucagon  Injectable 1 once  heparin   Injectable 5000 every 8 hours  insulin lispro (ADMELOG) corrective regimen sliding scale  every 6 hours  insulin NPH human recombinant 5 every 6 hours  insulin regular Infusion 2 <Continuous>  metolazone 10 once  pantoprazole  Injectable 40 daily  senna 2 at bedtime      Vital Signs Last 24 Hrs  T(C): 36.6 (29 Dec 2022 08:00), Max: 37.1 (28 Dec 2022 20:00)  T(F): 97.8 (29 Dec 2022 08:00), Max: 98.7 (28 Dec 2022 20:00)  HR: 101 (29 Dec 2022 09:00) (98 - 107)  BP: --  BP(mean): --  RR: 25 (29 Dec 2022 09:00) (12 - 35)  SpO2: 99% (29 Dec 2022 09:00) (95% - 100%)    Parameters below as of 29 Dec 2022 08:15  Patient On (Oxygen Delivery Method): tracheostomy collar  O2 Flow (L/min): 10  O2 Concentration (%): 50    PHYSICAL EXAM:  Ear/Nose/Throat: no oral lesion, no sinus tenderness on percussion	  Neck:no JVD, no lymphadenopathy, supple  Respiratory: CTA lore  Cardiovascular: S1S2 RRR, no murmurs  Gastrointestinal:soft, (+) BS, no HSM  Extremities:no e/e/c                            8.4    10.33 )-----------( 170      ( 29 Dec 2022 00:35 )             26.8       12-29    137  |  96  |  84<H>  ----------------------------<  124<H>  4.5   |  25  |  4.64<H>    Ca    8.0<L>      29 Dec 2022 00:35  Phos  5.8     12-29  Mg     2.6     12-29    TPro  6.9  /  Alb  2.9<L>  /  TBili  0.8  /  DBili  x   /  AST  30  /  ALT  21  /  AlkPhos  88  12-29          MICROBIOLOGY:  v    Culture - Blood (collected 27 Dec 2022 19:30)  Source: .Blood Blood-Peripheral  Preliminary Report (29 Dec 2022 02:02):    No growth to date.    Culture - Blood (collected 27 Dec 2022 16:44)  Source: .Blood Blood-Peripheral  Preliminary Report (29 Dec 2022 02:02):    No growth to date.    Culture - Sputum (collected 26 Dec 2022 11:54)  Source: Trach Asp Tracheal Aspirate  Gram Stain (26 Dec 2022 21:52):    No polymorphonuclear leukocytes per low power field    Few Squamous epithelial cells per low power field    Moderate Gram Positive Rods per oil power field  Final Report (28 Dec 2022 16:24):    Normal Respiratory Christy present    Culture - Blood (collected 26 Dec 2022 10:43)  Source: .Blood Blood  Preliminary Report (27 Dec 2022 17:01):    No growth to date.    Culture - Blood (collected 26 Dec 2022 06:38)  Source: .Blood Blood-Peripheral  Preliminary Report (27 Dec 2022 10:02):    No growth to date.    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    ************************************************************    This PCR assay was performed by multiplex PCR. This    Assay tests for 66 bacterial and resistance gene targets.    Please refer to the [facility] Labs test directory    at [email or web address] for details.  Organism: Blood Culture PCR (27 Dec 2022 19:42)  Organism: Blood Culture PCR (27 Dec 2022 19:42)      -  Candida tropicalis: Detec      Method Type: PCR                    RADIOLOGY:   Follow Up:  candidemia    Interval History/ROS:  Overnight: No acute events.  Patient remains afebrile, otherwise hemodynamically stable on baseline oxygen requirements.  Latest labs show no leukocytosis, CMP with elevated creatinine in setting of ESRD, hepatic function within normal limits.  Repeat blood cultures obtained on 12/27/2022 remain negative to date.  Latest chest x-ray on 12/29/2022 shows decrease in pulmonary congestion, left lower lobe opacity noted.    Patient seen examined at bedside.  Family at bedside.  No new pain or discomfort. Appears comfortable sitting in chair      Allergies  No Known Allergies        ANTIMICROBIALS:  caspofungin IVPB 50 every 24 hours  caspofungin IVPB    meropenem  IVPB 500 <User Schedule>      OTHER MEDS:  MEDICATIONS  (STANDING):  acetaminophen    Suspension .. 650 every 6 hours PRN  aMIOdarone    Tablet 200 daily  aspirin  chewable 81 daily  atorvastatin 80 at bedtime  dextrose 50% Injectable 50 every 15 minutes  dextrose Oral Gel 15 once PRN  furosemide   IVPB 100 once  glucagon  Injectable 1 once  heparin   Injectable 5000 every 8 hours  insulin lispro (ADMELOG) corrective regimen sliding scale  every 6 hours  insulin NPH human recombinant 5 every 6 hours  insulin regular Infusion 2 <Continuous>  metolazone 10 once  pantoprazole  Injectable 40 daily  senna 2 at bedtime      Vital Signs Last 24 Hrs  T(C): 36.6 (29 Dec 2022 08:00), Max: 37.1 (28 Dec 2022 20:00)  T(F): 97.8 (29 Dec 2022 08:00), Max: 98.7 (28 Dec 2022 20:00)  HR: 101 (29 Dec 2022 09:00) (98 - 107)  BP: --  BP(mean): --  RR: 25 (29 Dec 2022 09:00) (12 - 35)  SpO2: 99% (29 Dec 2022 09:00) (95% - 100%)    Parameters below as of 29 Dec 2022 08:15  Patient On (Oxygen Delivery Method): tracheostomy collar  O2 Flow (L/min): 10  O2 Concentration (%): 50    PHYSICAL EXAM:  Ear/Nose/Throat: no oral lesion, no sinus tenderness on percussion	  Neck:no JVD, no lymphadenopathy, supple  Respiratory: CTA lore  Cardiovascular: S1S2 RRR, no murmurs  Gastrointestinal:soft, (+) BS, no HSM  Extremities:no e/e/c                            8.4    10.33 )-----------( 170      ( 29 Dec 2022 00:35 )             26.8       12-29    137  |  96  |  84<H>  ----------------------------<  124<H>  4.5   |  25  |  4.64<H>    Ca    8.0<L>      29 Dec 2022 00:35  Phos  5.8     12-29  Mg     2.6     12-29    TPro  6.9  /  Alb  2.9<L>  /  TBili  0.8  /  DBili  x   /  AST  30  /  ALT  21  /  AlkPhos  88  12-29          MICROBIOLOGY:  v    Culture - Blood (collected 27 Dec 2022 19:30)  Source: .Blood Blood-Peripheral  Preliminary Report (29 Dec 2022 02:02):    No growth to date.    Culture - Blood (collected 27 Dec 2022 16:44)  Source: .Blood Blood-Peripheral  Preliminary Report (29 Dec 2022 02:02):    No growth to date.    Culture - Sputum (collected 26 Dec 2022 11:54)  Source: Trach Asp Tracheal Aspirate  Gram Stain (26 Dec 2022 21:52):    No polymorphonuclear leukocytes per low power field    Few Squamous epithelial cells per low power field    Moderate Gram Positive Rods per oil power field  Final Report (28 Dec 2022 16:24):    Normal Respiratory Christy present    Culture - Blood (collected 26 Dec 2022 10:43)  Source: .Blood Blood  Preliminary Report (27 Dec 2022 17:01):    No growth to date.    Culture - Blood (collected 26 Dec 2022 06:38)  Source: .Blood Blood-Peripheral  Preliminary Report (27 Dec 2022 10:02):    No growth to date.    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    ************************************************************    This PCR assay was performed by multiplex PCR. This    Assay tests for 66 bacterial and resistance gene targets.    Please refer to the [facility] Labs test directory    at [email or web address] for details.  Organism: Blood Culture PCR (27 Dec 2022 19:42)  Organism: Blood Culture PCR (27 Dec 2022 19:42)      -  Candida tropicalis: Detec      Method Type: PCR                    RADIOLOGY:   Follow Up:  candidemia    Interval History/ROS:  Overnight: No acute events.  Patient remains afebrile, otherwise hemodynamically stable on baseline oxygen requirements.  Latest labs show no leukocytosis, CMP with elevated creatinine in setting of ESRD, hepatic function within normal limits.  Repeat blood cultures obtained on 12/27/2022 remain negative to date.  Latest chest x-ray on 12/29/2022 shows decrease in pulmonary congestion, left lower lobe opacity noted.    Patient seen examined at bedside.  Family at bedside.  No new pain or discomfort. Appears comfortable sitting in chair      Allergies  No Known Allergies        ANTIMICROBIALS:  caspofungin IVPB 50 every 24 hours  caspofungin IVPB    meropenem  IVPB 500 <User Schedule>      OTHER MEDS:  MEDICATIONS  (STANDING):  acetaminophen    Suspension .. 650 every 6 hours PRN  aMIOdarone    Tablet 200 daily  aspirin  chewable 81 daily  atorvastatin 80 at bedtime  dextrose 50% Injectable 50 every 15 minutes  dextrose Oral Gel 15 once PRN  furosemide   IVPB 100 once  glucagon  Injectable 1 once  heparin   Injectable 5000 every 8 hours  insulin lispro (ADMELOG) corrective regimen sliding scale  every 6 hours  insulin NPH human recombinant 5 every 6 hours  insulin regular Infusion 2 <Continuous>  metolazone 10 once  pantoprazole  Injectable 40 daily  senna 2 at bedtime      Vital Signs Last 24 Hrs  T(C): 36.6 (29 Dec 2022 08:00), Max: 37.1 (28 Dec 2022 20:00)  T(F): 97.8 (29 Dec 2022 08:00), Max: 98.7 (28 Dec 2022 20:00)  HR: 101 (29 Dec 2022 09:00) (98 - 107)  BP: --  BP(mean): --  RR: 25 (29 Dec 2022 09:00) (12 - 35)  SpO2: 99% (29 Dec 2022 09:00) (95% - 100%)    Parameters below as of 29 Dec 2022 08:15  Patient On (Oxygen Delivery Method): tracheostomy collar  O2 Flow (L/min): 10  O2 Concentration (%): 50    PHYSICAL EXAM:  Ear/Nose/Throat: no oral lesion, no sinus tenderness on percussion	  Neck:no JVD, no lymphadenopathy, supple  Respiratory: CTA lore  Cardiovascular: S1S2 RRR, no murmurs  Gastrointestinal:soft, (+) BS, no HSM  Extremities:no e/e/c                            8.4    10.33 )-----------( 170      ( 29 Dec 2022 00:35 )             26.8       12-29    137  |  96  |  84<H>  ----------------------------<  124<H>  4.5   |  25  |  4.64<H>    Ca    8.0<L>      29 Dec 2022 00:35  Phos  5.8     12-29  Mg     2.6     12-29    TPro  6.9  /  Alb  2.9<L>  /  TBili  0.8  /  DBili  x   /  AST  30  /  ALT  21  /  AlkPhos  88  12-29          MICROBIOLOGY:  v    Culture - Blood (collected 27 Dec 2022 19:30)  Source: .Blood Blood-Peripheral  Preliminary Report (29 Dec 2022 02:02):    No growth to date.    Culture - Blood (collected 27 Dec 2022 16:44)  Source: .Blood Blood-Peripheral  Preliminary Report (29 Dec 2022 02:02):    No growth to date.    Culture - Sputum (collected 26 Dec 2022 11:54)  Source: Trach Asp Tracheal Aspirate  Gram Stain (26 Dec 2022 21:52):    No polymorphonuclear leukocytes per low power field    Few Squamous epithelial cells per low power field    Moderate Gram Positive Rods per oil power field  Final Report (28 Dec 2022 16:24):    Normal Respiratory Christy present    Culture - Blood (collected 26 Dec 2022 10:43)  Source: .Blood Blood  Preliminary Report (27 Dec 2022 17:01):    No growth to date.    Culture - Blood (collected 26 Dec 2022 06:38)  Source: .Blood Blood-Peripheral  Preliminary Report (27 Dec 2022 10:02):    No growth to date.    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    ************************************************************    This PCR assay was performed by multiplex PCR. This    Assay tests for 66 bacterial and resistance gene targets.    Please refer to the [facility] test directory    at [email or web address] for details.  Organism: Blood Culture PCR (27 Dec 2022 19:42)  Organism: Blood Culture PCR (27 Dec 2022 19:42)      -  Candida tropicalis: Detec      Method Type: PCR                    RADIOLOGY:

## 2022-12-29 NOTE — PROGRESS NOTE ADULT - PROBLEM SELECTOR PLAN 2
- Resolved  - ECMO decannulated 12/08 after successful wean with IABP and inotropes. IABP removed 12/17  - Had severe MR post ECMO removal which prevented appropriate weaning of IABP. Underwent mitral clip on 12/16 with reduction of MR to mild/moderate.

## 2022-12-29 NOTE — PROGRESS NOTE ADULT - PROBLEM SELECTOR PLAN 3
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  - eventual beta blocker

## 2022-12-29 NOTE — PROGRESS NOTE ADULT - PROBLEM SELECTOR PLAN 5
- likely arrest associated ATN, hypovolemia; nonoliguric   - was on CVVH now on HD   - appreciate nephrology recommendations   - will hold off on addition of ACE/ARB. - likely arrest associated ATN, hypovolemia; nonoliguric   - was on CVVH now on HD   - appreciate nephrology recommendations   - avoid nephrotoxins

## 2022-12-29 NOTE — PROGRESS NOTE ADULT - SUBJECTIVE AND OBJECTIVE BOX
CRITICAL CARE ATTENDING - CTICU    MEDICATIONS  (STANDING):  aMIOdarone    Tablet 200 milliGRAM(s) Oral daily  aspirin  chewable 81 milliGRAM(s) Oral daily  atorvastatin 80 milliGRAM(s) Oral at bedtime  caspofungin IVPB 50 milliGRAM(s) IV Intermittent every 24 hours  caspofungin IVPB      chlorhexidine 0.12% Liquid 15 milliLiter(s) Oral Mucosa every 12 hours  chlorhexidine 2% Cloths 1 Application(s) Topical <User Schedule>  dextrose 50% Injectable 50 milliLiter(s) IV Push every 15 minutes  heparin   Injectable 5000 Unit(s) SubCutaneous every 8 hours  insulin regular Infusion 2 Unit(s)/Hr (2 mL/Hr) IV Continuous <Continuous>  meropenem  IVPB 500 milliGRAM(s) IV Intermittent <User Schedule>  multivitamin/minerals/iron Oral Solution (CENTRUM) 15 milliLiter(s) Oral daily  pantoprazole  Injectable 40 milliGRAM(s) IV Push daily  senna 2 Tablet(s) Oral at bedtime  thiamine 100 milliGRAM(s) Enteral Tube daily                                    8.4    10.33 )-----------( 170      ( 29 Dec 2022 00:35 )             26.8           137  |  96  |  84<H>  ----------------------------<  124<H>  4.5   |  25  |  4.64<H>    Ca    8.0<L>      29 Dec 2022 00:35  Phos  5.8       Mg     2.6         TPro  6.9  /  Alb  2.9<L>  /  TBili  0.8  /  DBili  x   /  AST  30  /  ALT  21  /  AlkPhos  88        PT/INR - ( 29 Dec 2022 00:35 )   PT: 12.2 sec;   INR: 1.06 ratio         PTT - ( 29 Dec 2022 00:35 )  PTT:31.2 sec    Mode: vent off      Daily     Daily Weight in k.2 (29 Dec 2022 00:00)       @ 07:01  -   @ 07:00  --------------------------------------------------------  IN: 2649.5 mL / OUT: 3130 mL / NET: -480.5 mL      Critically Ill patient  : [ ] preoperative , [x] Non Operative    Requires :  [x ] Arterial Line   [ ] Central Line  [ ] PA catheter  [ ] IABP [ ] ECMO  [ ] LVAD  [x ] Ventilator  [ ] pacemaker [x] Trach  [x] HD                      [x ] ABG's     [x ] Pulse Oxymetry Monitoring  Bedside evaluation , monitoring , treatment of hemodynamics , fluids , IVP/ IVCD meds.      Diagnosis:      - [facility] - arrest in [facility]     ECMO Decannulation      POD - IABP placed in cath lab / IABP dc'ed     POD - Mitral Clip x1, TRACH 7 Merari XLT prox with ENT    MI     Acute Pancreatitis     ARDS     Hemodynamic lability,  instability. Requires IVCD [ ] vasopressors [ ]  inotropes  [ ] vasodilator  [x]IVSS fluid  to maintain MAP, perfusion, C.I.     Post Arrest Shock state - resolving    Respiratory Failure - Tracheostomy     Requires chest PT, pulmonary toilet, ambu bagging, suctioning to maintain SaO2,  patent airway and treat atelectasis.     Ventilator Management:  [x ]AC-rest    [ x]CPAP-PS Wean    [x]Trach Collar     [ ]Extubate    [ ] T-Piece  [ ]peep>5     Difficult weaning process - multiple organ system involvement in critically ill patient     CHF- acute [ x]   chronic [  ]    systolic [ x]   diatolic [ ]          - Echo- EF -   30-40% Severe segmental LV systolic dysfunction          [ ] RV dysfunction     - Cxr-cardiomegally, edema          - Clinical-  [ ]inotropes   [ ]pressors  [ ]diuresis   [ ]IABP      [ ]LVAD   [x]Respiratory Failure.     Cardiogenic Shock - resolving    [ x] Hemodialysis  [ ] Hemofiltration:    [x ] negative fluid balance [ ] even fluid balance [ ] positive fluid balance, in a hemodynamically unstable patient.     Hypotension     DM- ISS    Renal Failure - Acute Kidney Injury     Requires bedside physical therapy, mobilization and total longterm care.     Tolerates NG / NJ feeds at [x] goal rate  [ ] trophic rate    [ ]       rate     fevers     UA c/w infection     Opthamology consult tomorrow         By signing my name below, I, Maria D'Amico, attest that this documentation has been prepared under the direction and in the presence of Finn Zelaya MD.   Electronically Signed: Maria D'Amico, Scribe - @ 07:12      Discussed with CT surgeon, Physician Assistant - Nurse Practitioner- Critical care medicine team.   Discussed at  AM / PM rounds.   Chart, labs , films reviewed.    Cumulative Critical Care Time Given Today:  CRITICAL CARE ATTENDING - CTICU    MEDICATIONS  (STANDING):  aMIOdarone    Tablet 200 milliGRAM(s) Oral daily  aspirin  chewable 81 milliGRAM(s) Oral daily  atorvastatin 80 milliGRAM(s) Oral at bedtime  caspofungin IVPB 50 milliGRAM(s) IV Intermittent every 24 hours  caspofungin IVPB      chlorhexidine 0.12% Liquid 15 milliLiter(s) Oral Mucosa every 12 hours  chlorhexidine 2% Cloths 1 Application(s) Topical <User Schedule>  dextrose 50% Injectable 50 milliLiter(s) IV Push every 15 minutes  heparin   Injectable 5000 Unit(s) SubCutaneous every 8 hours  insulin regular Infusion 2 Unit(s)/Hr (2 mL/Hr) IV Continuous <Continuous>  meropenem  IVPB 500 milliGRAM(s) IV Intermittent <User Schedule>  multivitamin/minerals/iron Oral Solution (CENTRUM) 15 milliLiter(s) Oral daily  pantoprazole  Injectable 40 milliGRAM(s) IV Push daily  senna 2 Tablet(s) Oral at bedtime  thiamine 100 milliGRAM(s) Enteral Tube daily                                    8.4    10.33 )-----------( 170      ( 29 Dec 2022 00:35 )             26.8           137  |  96  |  84<H>  ----------------------------<  124<H>  4.5   |  25  |  4.64<H>    Ca    8.0<L>      29 Dec 2022 00:35  Phos  5.8       Mg     2.6         TPro  6.9  /  Alb  2.9<L>  /  TBili  0.8  /  DBili  x   /  AST  30  /  ALT  21  /  AlkPhos  88        PT/INR - ( 29 Dec 2022 00:35 )   PT: 12.2 sec;   INR: 1.06 ratio         PTT - ( 29 Dec 2022 00:35 )  PTT:31.2 sec    Mode: vent off      Daily     Daily Weight in k.2 (29 Dec 2022 00:00)       @ 07:01  -   @ 07:00  --------------------------------------------------------  IN: 2649.5 mL / OUT: 3130 mL / NET: -480.5 mL      Critically Ill patient  : [ ] preoperative , [x] Non Operative    Requires :  [x ] Arterial Line   [ ] Central Line  [ ] PA catheter  [ ] IABP [ ] ECMO  [ ] LVAD  [x ] Ventilator  [ ] pacemaker [x] Trach  [x] HD                      [x ] ABG's     [x ] Pulse Oxymetry Monitoring  Bedside evaluation , monitoring , treatment of hemodynamics , fluids , IVP/ IVCD meds.      Diagnosis:      - [facility] - arrest in [facility]     ECMO Decannulation      POD - IABP placed in cath lab / IABP dc'ed     POD - Mitral Clip x1, TRACH 7 Merari XLT prox with ENT    MI     Acute Pancreatitis     ARDS     Hemodynamic lability,  instability. Requires IVCD [ ] vasopressors [ ]  inotropes  [ ] vasodilator  [x]IVSS fluid  to maintain MAP, perfusion, C.I.     Post Arrest Shock state - resolving    Respiratory Failure - Tracheostomy     Requires chest PT, pulmonary toilet, ambu bagging, suctioning to maintain SaO2,  patent airway and treat atelectasis.     Ventilator Management:  [x ]AC-rest    [ x]CPAP-PS Wean    [x]Trach Collar     [ ]Extubate    [ ] T-Piece  [ ]peep>5     Difficult weaning process - multiple organ system involvement in critically ill patient     CHF- acute [ x]   chronic [  ]    systolic [ x]   diatolic [ ]          - Echo- EF -   30-40% Severe segmental LV systolic dysfunction          [ ] RV dysfunction     - Cxr-cardiomegally, edema          - Clinical-  [ ]inotropes   [ ]pressors  [ ]diuresis   [ ]IABP      [ ]LVAD   [x]Respiratory Failure.     Cardiogenic Shock - resolving    [ x] Hemodialysis  [ ] Hemofiltration:    [x ] negative fluid balance [ ] even fluid balance [ ] positive fluid balance, in a hemodynamically unstable patient.     Hypotension     DM- ISS    Renal Failure - Acute Kidney Injury     Requires bedside physical therapy, mobilization and total assisted care.     Tolerates NG / NJ feeds at [x] goal rate  [ ] trophic rate    [ ]       rate     fevers     UA c/w infection     Opthamology consult tomorrow         By signing my name below, I, Maria D'Amico, attest that this documentation has been prepared under the direction and in the presence of Finn Zelaya MD.   Electronically Signed: Maria D'Amico, Scribe - @ 07:12      Discussed with CT surgeon, Physician Assistant - Nurse Practitioner- Critical care medicine team.   Discussed at  AM / PM rounds.   Chart, labs , films reviewed.    Cumulative Critical Care Time Given Today:  CRITICAL CARE ATTENDING - [facility]    MEDICATIONS  (STANDING):  aMIOdarone    Tablet 200 milliGRAM(s) Oral daily  aspirin  chewable 81 milliGRAM(s) Oral daily  atorvastatin 80 milliGRAM(s) Oral at bedtime  caspofungin IVPB 50 milliGRAM(s) IV Intermittent every 24 hours  caspofungin IVPB      chlorhexidine 0.12% Liquid 15 milliLiter(s) Oral Mucosa every 12 hours  chlorhexidine 2% Cloths 1 Application(s) Topical <User Schedule>  dextrose 50% Injectable 50 milliLiter(s) IV Push every 15 minutes  heparin   Injectable 5000 Unit(s) SubCutaneous every 8 hours  insulin regular Infusion 2 Unit(s)/Hr (2 mL/Hr) IV Continuous <Continuous>  meropenem  IVPB 500 milliGRAM(s) IV Intermittent <User Schedule>  multivitamin/minerals/iron Oral Solution (CENTRUM) 15 milliLiter(s) Oral daily  pantoprazole  Injectable 40 milliGRAM(s) IV Push daily  senna 2 Tablet(s) Oral at bedtime  thiamine 100 milliGRAM(s) Enteral Tube daily                                    8.4    10.33 )-----------( 170      ( 29 Dec 2022 00:35 )             26.8           137  |  96  |  84<H>  ----------------------------<  124<H>  4.5   |  25  |  4.64<H>    Ca    8.0<L>      29 Dec 2022 00:35  Phos  5.8       Mg     2.6         TPro  6.9  /  Alb  2.9<L>  /  TBili  0.8  /  DBili  x   /  AST  30  /  ALT  21  /  AlkPhos  88        PT/INR - ( 29 Dec 2022 00:35 )   PT: 12.2 sec;   INR: 1.06 ratio         PTT - ( 29 Dec 2022 00:35 )  PTT:31.2 sec    Mode: vent off      Daily     Daily Weight in k.2 (29 Dec 2022 00:00)       @ 07:01  -   @ 07:00  --------------------------------------------------------  IN: 2649.5 mL / OUT: 3130 mL / NET: -480.5 mL      Critically Ill patient  : [ ] preoperative , [x] Non Operative    Requires :  [x ] Arterial Line   [ ] Central Line  [ ] PA catheter  [ ] IABP [ ] ECMO  [ ] LVAD  [x ] Ventilator  [ ] pacemaker [x] Trach  [x] HD                      [x ] ABG's     [x ] Pulse Oxymetry Monitoring  Bedside evaluation , monitoring , treatment of hemodynamics , fluids , IVP/ IVCD meds.      Diagnosis:      - [facility] - arrest in [facility]     ECMO Decannulation      POD - IABP placed in cath lab / IABP dc'ed     POD - Mitral Clip x1, TRACH 7 Merari XLT prox with ENT    MI     Acute Pancreatitis     ARDS     Hemodynamic lability,  instability. Requires IVCD [ ] vasopressors [ ]  inotropes  [ ] vasodilator  [x]IVSS fluid  to maintain MAP, perfusion, C.I.     Post Arrest Shock state - resolving    Respiratory Failure - Tracheostomy     Requires chest PT, pulmonary toilet, ambu bagging, suctioning to maintain SaO2,  patent airway and treat atelectasis.     Ventilator Management:  [x ]AC-rest    [ x]CPAP-PS Wean    [x]Trach Collar     [ ]Extubate    [ ] T-Piece  [ ]peep>5     Difficult weaning process - multiple organ system involvement in critically ill patient     CHF- acute [ x]   chronic [  ]    systolic [ x]   diatolic [ ]          - Echo- EF -   30-40% Severe segmental LV systolic dysfunction          [ ] RV dysfunction     - Cxr-cardiomegally, edema          - Clinical-  [ ]inotropes   [ ]pressors  [ ]diuresis   [ ]IABP      [ ]LVAD   [x]Respiratory Failure.     Cardiogenic Shock - resolving    [ x] Hemodialysis  [ ] Hemofiltration:    [x ] negative fluid balance [ ] even fluid balance [ ] positive fluid balance, in a hemodynamically unstable patient.     Hypotension     DM- ISS    Renal Failure - Acute Kidney Injury     Requires bedside physical therapy, mobilization and total intermediate care.     Tolerates NG / NJ feeds at [x] goal rate  [ ] trophic rate    [ ]       rate     fevers     UA c/w infection     Opthamology consult tomorrow         By signing my name below, I, Maria D'Amico, attest that this documentation has been prepared under the direction and in the presence of Finn Zelaya MD.   Electronically Signed: Maria D'Amico, Scribe - @ 07:12      Discussed with CT surgeon, Physician Assistant - Nurse Practitioner- Critical care medicine team.   Discussed at  AM / PM rounds.   Chart, labs , films reviewed.    Cumulative Critical Care Time Given Today:  CRITICAL CARE ATTENDING - CTICU    MEDICATIONS  (STANDING):  aMIOdarone    Tablet 200 milliGRAM(s) Oral daily  aspirin  chewable 81 milliGRAM(s) Oral daily  atorvastatin 80 milliGRAM(s) Oral at bedtime  caspofungin IVPB 50 milliGRAM(s) IV Intermittent every 24 hours  caspofungin IVPB      chlorhexidine 0.12% Liquid 15 milliLiter(s) Oral Mucosa every 12 hours  chlorhexidine 2% Cloths 1 Application(s) Topical <User Schedule>  dextrose 50% Injectable 50 milliLiter(s) IV Push every 15 minutes  heparin   Injectable 5000 Unit(s) SubCutaneous every 8 hours  insulin regular Infusion 2 Unit(s)/Hr (2 mL/Hr) IV Continuous <Continuous>  meropenem  IVPB 500 milliGRAM(s) IV Intermittent <User Schedule>  multivitamin/minerals/iron Oral Solution (CENTRUM) 15 milliLiter(s) Oral daily  pantoprazole  Injectable 40 milliGRAM(s) IV Push daily  senna 2 Tablet(s) Oral at bedtime  thiamine 100 milliGRAM(s) Enteral Tube daily                                    8.4    10.33 )-----------( 170      ( 29 Dec 2022 00:35 )             26.8           137  |  96  |  84<H>  ----------------------------<  124<H>  4.5   |  25  |  4.64<H>    Ca    8.0<L>      29 Dec 2022 00:35  Phos  5.8       Mg     2.6         TPro  6.9  /  Alb  2.9<L>  /  TBili  0.8  /  DBili  x   /  AST  30  /  ALT  21  /  AlkPhos  88        PT/INR - ( 29 Dec 2022 00:35 )   PT: 12.2 sec;   INR: 1.06 ratio         PTT - ( 29 Dec 2022 00:35 )  PTT:31.2 sec    Mode: vent off      Daily     Daily Weight in k.2 (29 Dec 2022 00:00)       @ 07:01  -   @ 07:00  --------------------------------------------------------  IN: 2649.5 mL / OUT: 3130 mL / NET: -480.5 mL      Critically Ill patient  : [ ] preoperative , [x] Non Operative    Requires :  [x ] Arterial Line   [ ] Central Line  [ ] PA catheter  [ ] IABP [ ] ECMO  [ ] LVAD  [x ] Ventilator  [ ] pacemaker [x] Trach  [x] HD                      [x ] ABG's     [x ] Pulse Oxymetry Monitoring  Bedside evaluation , monitoring , treatment of hemodynamics , fluids , IVP/ IVCD meds.      Diagnosis:      - [facility] - arrest in [facility]     ECMO Decannulation      POD - IABP placed in cath lab / IABP dc'ed     POD - Mitral Clip x1, TRACH 7 Merari XLT prox with ENT    MI     Acute Pancreatitis     ARDS     Hemodynamic lability,  instability. Requires IVCD [ ] vasopressors [ ]  inotropes  [ ] vasodilator  [x]IVSS fluid  to maintain MAP, perfusion, C.I.     Post Arrest Shock state - resolving    Respiratory Failure - Tracheostomy     Requires chest PT, pulmonary toilet, ambu bagging, suctioning to maintain SaO2,  patent airway and treat atelectasis.     Ventilator Management:  [x ]AC-rest    [ x]CPAP-PS Wean    [x]Trach Collar     [ ]Extubate    [ ] T-Piece  [ ]peep>5     Difficult weaning process - multiple organ system involvement in critically ill patient     CHF- acute [ x]   chronic [  ]    systolic [ x]   diatolic [ ]          - Echo- EF -   20%  Severe segmental LV systolic dysfunction          [ ] RV dysfunction     - Cxr-cardiomegally, edema          - Clinical-  [ ]inotropes   [ ]pressors  [ ]diuresis   [ ]IABP      [ ]LVAD   [x]Respiratory Failure.     Cardiogenic Shock - resolving    [ x] Hemodialysis  [ x] Hemofiltration:   today   [x ] negative fluid balance [ ] even fluid balance [ ] positive fluid balance, in a hemodynamically unstable patient.     Fluid  overload     Hypotension     DM- ISS    Renal Failure - Acute Kidney Injury     Requires bedside physical therapy, mobilization and total snf care.     Tolerates NG / NJ feeds at [x] goal rate  [ ] trophic rate    [ ]       rate     fevers     UA c/w infection     Opthamology consult tomorrow         By signing my name below, I, Maria D'Amico, attest that this documentation has been prepared under the direction and in the presence of Finn Zelaya MD.   Electronically Signed: Maria D'Amico, Scribe 22 @ 07:12    I, Finn Zelaya, personally performed the services described in this documentation. All medical record entries made by the scribe were at my direction and in my presence. I have reviewed the chart and agree that the record reflects my personal performance and is accurate and complete.   Finn Zelaya MD.       Discussed with CT surgeon, Physician Assistant - Nurse Practitioner- Critical care medicine team.   Discussed at  AM / PM rounds.   Chart, labs , films reviewed.    Cumulative Critical Care Time Given Today:  30 min CRITICAL CARE ATTENDING - CTICU    MEDICATIONS  (STANDING):  aMIOdarone    Tablet 200 milliGRAM(s) Oral daily  aspirin  chewable 81 milliGRAM(s) Oral daily  atorvastatin 80 milliGRAM(s) Oral at bedtime  caspofungin IVPB 50 milliGRAM(s) IV Intermittent every 24 hours  caspofungin IVPB      chlorhexidine 0.12% Liquid 15 milliLiter(s) Oral Mucosa every 12 hours  chlorhexidine 2% Cloths 1 Application(s) Topical <User Schedule>  dextrose 50% Injectable 50 milliLiter(s) IV Push every 15 minutes  heparin   Injectable 5000 Unit(s) SubCutaneous every 8 hours  insulin regular Infusion 2 Unit(s)/Hr (2 mL/Hr) IV Continuous <Continuous>  meropenem  IVPB 500 milliGRAM(s) IV Intermittent <User Schedule>  multivitamin/minerals/iron Oral Solution (CENTRUM) 15 milliLiter(s) Oral daily  pantoprazole  Injectable 40 milliGRAM(s) IV Push daily  senna 2 Tablet(s) Oral at bedtime  thiamine 100 milliGRAM(s) Enteral Tube daily                                    8.4    10.33 )-----------( 170      ( 29 Dec 2022 00:35 )             26.8           137  |  96  |  84<H>  ----------------------------<  124<H>  4.5   |  25  |  4.64<H>    Ca    8.0<L>      29 Dec 2022 00:35  Phos  5.8       Mg     2.6         TPro  6.9  /  Alb  2.9<L>  /  TBili  0.8  /  DBili  x   /  AST  30  /  ALT  21  /  AlkPhos  88        PT/INR - ( 29 Dec 2022 00:35 )   PT: 12.2 sec;   INR: 1.06 ratio         PTT - ( 29 Dec 2022 00:35 )  PTT:31.2 sec    Mode: vent off      Daily     Daily Weight in k.2 (29 Dec 2022 00:00)       @ 07:01  -   @ 07:00  --------------------------------------------------------  IN: 2649.5 mL / OUT: 3130 mL / NET: -480.5 mL      Critically Ill patient  : [ ] preoperative , [x] Non Operative    Requires :  [x ] Arterial Line   [ ] Central Line  [ ] PA catheter  [ ] IABP [ ] ECMO  [ ] LVAD  [x ] Ventilator  [ ] pacemaker [x] Trach  [x] HD                      [x ] ABG's     [x ] Pulse Oxymetry Monitoring  Bedside evaluation , monitoring , treatment of hemodynamics , fluids , IVP/ IVCD meds.      Diagnosis:      - [facility] - arrest in SICU     ECMO Decannulation      POD - IABP placed in cath lab / IABP dc'ed     POD - Mitral Clip x1, TRACH 7 Merari XLT prox with ENT    MI     Acute Pancreatitis     ARDS     Hemodynamic lability,  instability. Requires IVCD [ ] vasopressors [ ]  inotropes  [ ] vasodilator  [x]IVSS fluid  to maintain MAP, perfusion, C.I.     Post Arrest Shock state - resolving    Respiratory Failure - Tracheostomy     Requires chest PT, pulmonary toilet, ambu bagging, suctioning to maintain SaO2,  patent airway and treat atelectasis.     Ventilator Management:  [x ]AC-rest    [ x]CPAP-PS Wean    [x]Trach Collar     [ ]Extubate    [ ] T-Piece  [ ]peep>5     Difficult weaning process - multiple organ system involvement in critically ill patient     CHF- acute [ x]   chronic [  ]    systolic [ x]   diatolic [ ]          - Echo- EF -   20%  Severe segmental LV systolic dysfunction          [ ] RV dysfunction     - Cxr-cardiomegally, edema          - Clinical-  [ ]inotropes   [ ]pressors  [ ]diuresis   [ ]IABP      [ ]LVAD   [x]Respiratory Failure.     Cardiogenic Shock - resolving    [ x] Hemodialysis  [ x] Hemofiltration:   today   [x ] negative fluid balance [ ] even fluid balance [ ] positive fluid balance, in a hemodynamically unstable patient.     Fluid  overload     Hypotension     DM- ISS    Renal Failure - Acute Kidney Injury     Requires bedside physical therapy, mobilization and total [facility] care.     Tolerates NG / NJ feeds at [x] goal rate  [ ] trophic rate    [ ]       rate     fevers     UA c/w infection     Opthamology consult tomorrow         By signing my name below, I, Maria D'Amico, attest that this documentation has been prepared under the direction and in the presence of Finn Zelaya MD.   Electronically Signed: Maria D'Amico, Scribe 22 @ 07:12    I, Finn Zelaya, personally performed the services described in this documentation. All medical record entries made by the scribe were at my direction and in my presence. I have reviewed the chart and agree that the record reflects my personal performance and is accurate and complete.   Finn Zelaya MD.       Discussed with CT surgeon, Physician Assistant - Nurse Practitioner- Critical care medicine team.   Discussed at  AM / PM rounds.   Chart, labs , films reviewed.    Cumulative Critical Care Time Given Today:  30 min CRITICAL CARE ATTENDING - CTICU    MEDICATIONS  (STANDING):  aMIOdarone    Tablet 200 milliGRAM(s) Oral daily  aspirin  chewable 81 milliGRAM(s) Oral daily  atorvastatin 80 milliGRAM(s) Oral at bedtime  caspofungin IVPB 50 milliGRAM(s) IV Intermittent every 24 hours  caspofungin IVPB      chlorhexidine 0.12% Liquid 15 milliLiter(s) Oral Mucosa every 12 hours  chlorhexidine 2% Cloths 1 Application(s) Topical <User Schedule>  dextrose 50% Injectable 50 milliLiter(s) IV Push every 15 minutes  heparin   Injectable 5000 Unit(s) SubCutaneous every 8 hours  insulin regular Infusion 2 Unit(s)/Hr (2 mL/Hr) IV Continuous <Continuous>  meropenem  IVPB 500 milliGRAM(s) IV Intermittent <User Schedule>  multivitamin/minerals/iron Oral Solution (CENTRUM) 15 milliLiter(s) Oral daily  pantoprazole  Injectable 40 milliGRAM(s) IV Push daily  senna 2 Tablet(s) Oral at bedtime  thiamine 100 milliGRAM(s) Enteral Tube daily                                    8.4    10.33 )-----------( 170      ( 29 Dec 2022 00:35 )             26.8           137  |  96  |  84<H>  ----------------------------<  124<H>  4.5   |  25  |  4.64<H>    Ca    8.0<L>      29 Dec 2022 00:35  Phos  5.8       Mg     2.6         TPro  6.9  /  Alb  2.9<L>  /  TBili  0.8  /  DBili  x   /  AST  30  /  ALT  21  /  AlkPhos  88        PT/INR - ( 29 Dec 2022 00:35 )   PT: 12.2 sec;   INR: 1.06 ratio         PTT - ( 29 Dec 2022 00:35 )  PTT:31.2 sec    Mode: vent off      Daily     Daily Weight in k.2 (29 Dec 2022 00:00)       @ 07:01  -   @ 07:00  --------------------------------------------------------  IN: 2649.5 mL / OUT: 3130 mL / NET: -480.5 mL      Critically Ill patient  : [ ] preoperative , [x] Non Operative    Requires :  [x ] Arterial Line   [ ] Central Line  [ ] PA catheter  [ ] IABP [ ] ECMO  [ ] LVAD  [x ] Ventilator  [ ] pacemaker [x] Trach  [x] HD                      [x ] ABG's     [x ] Pulse Oxymetry Monitoring  Bedside evaluation , monitoring , treatment of hemodynamics , fluids , IVP/ IVCD meds.      Diagnosis:      - [facility] ECM[facility] - arrest in SICU     ECMO Decannulation      POD - IABP placed in cath lab / IABP dc'ed     POD - Mitral Clip x1, TRACH 7 Merari XLT prox with ENT    MI     Acute Pancreatitis     ARDS     Hemodynamic lability,  instability. Requires IVCD [ ] vasopressors [ ]  inotropes  [ ] vasodilator  [x]IVSS fluid  to maintain MAP, perfusion, C.I.     Post Arrest Shock state - resolving    Respiratory Failure - Tracheostomy     Requires chest PT, pulmonary toilet, ambu bagging, suctioning to maintain SaO2,  patent airway and treat atelectasis.     Ventilator Management:  [x ]AC-rest    [ x]CPAP-PS Wean    [x]Trach Collar     [ ]Extubate    [ ] T-Piece  [ ]peep>5     Difficult weaning process - multiple organ system involvement in critically ill patient     CHF- acute [ x]   chronic [  ]    systolic [ x]   diatolic [ ]          - Echo- EF -   20%  Severe segmental LV systolic dysfunction          [ ] RV dysfunction     - Cxr-cardiomegally, edema          - Clinical-  [ ]inotropes   [ ]pressors  [ ]diuresis   [ ]IABP      [ ]LVAD   [x]Respiratory Failure.     Cardiogenic Shock - resolving    [ x] Hemodialysis  [ x] Hemofiltration:   today   [x ] negative fluid balance [ ] even fluid balance [ ] positive fluid balance, in a hemodynamically unstable patient.     Fluid  overload     Hypotension     DM- ISS    Renal Failure - Acute Kidney Injury     Requires bedside physical therapy, mobilization and total long term care.     Tolerates NG / NJ feeds at [x] goal rate  [ ] trophic rate    [ ]       rate     fevers     UA c/w infection     Opthamology consult tomorrow         By signing my name below, I, Maria D'Amico, attest that this documentation has been prepared under the direction and in the presence of Finn Zelaya MD.   Electronically Signed: Maria D'Amico, Scribe 22 @ 07:12    I, Finn Zelaya, personally performed the services described in this documentation. All medical record entries made by the scribe were at my direction and in my presence. I have reviewed the chart and agree that the record reflects my personal performance and is accurate and complete.   Finn Zelaya MD.       Discussed with CT surgeon, Physician Assistant - Nurse Practitioner- Critical care medicine team.   Discussed at  AM / PM rounds.   Chart, labs , films reviewed.    Cumulative Critical Care Time Given Today:  30 min

## 2022-12-29 NOTE — PROGRESS NOTE ADULT - PROBLEM SELECTOR PLAN 1
- ischemic with most recent TTE showing EF 22%  - Please start hydralazine 10mg TID, hold for SBP < 90 and skip dose prior to HD  - Will hold off on ACE/ARB/ARNi given ongoing renal pathology  - Defer BB at this time given tenuous state  - appreciate volume removal via HD, last HD 12/28. may benefit from additional session of UF today - ischemic with most recent TTE showing EF 22%  - Please start hydralazine 10mg TID, hold for SBP < 90 and skip dose prior to HD  - Will hold off on ACE/ARB/ARNi given ongoing renal injury  - Defer BB at this time, but can revisit as he progresses along  - volume management with HD, last 12/28; does make some urine  - defer MRA, ARNI, and SGLT2-i until evidence of renal recovery

## 2022-12-29 NOTE — PROGRESS NOTE ADULT - PROBLEM SELECTOR PLAN 2
Hgb below goal, Anemia workup  Check iron studies. Will need to optimize TSAT before starting MIGUEL. If above 20% will start MIGUEL.     If you have any questions, please feel free to contact me  Arsalan Cochran  Nephrology Fellow  [phone]; Prefer [employer] TEAMS  (After 5pm or on weekends please page the on-call fellow) Hgb below goal, Anemia workup  Check iron studies. Will need to optimize TSAT before starting MIGUEL. If above 20% will start MIGUEL.     If you have any questions, please feel free to contact me  Arsalan Cochran  Nephrology Fellow  [phone]; Prefer [employer] TEAMS  (After 5pm or on weekends please page the on-call fellow) Hgb below goal, Anemia workup  Check iron studies. Will need to optimize TSAT before starting MIGUEL. If above 20% will start MIGUEL.     If you have any questions, please feel free to contact me  Arsalan Cochran  Nephrology Fellow  [phone]; Prefer [employer] TEAMS  (After 5pm or on weekends please page the on-call fellow)

## 2022-12-29 NOTE — PROGRESS NOTE ADULT - ASSESSMENT
62M CAD s/p CABG, DM, HTN, presented as a trasnfer from [place] for STEMI, found to have pancreatitis not necrotizing and cholecystitis on CT AP, admitted to to SICU for respiratory failure due to ARDS. Moved to the CTU for ECMO for cardiogenic shock vs ARDS, ECMO decannulated on 12/8 with IABP placement and mitral clip and removed on 12/17, s/p trac on 12/16/22, now on trach collar.    #Candidemia  Bcx positive for candida tropicalis on 12/26/22  Repeat with no growth to date  Limited TTE shows no evidence for valvular vegetations, mitral clip in place    #ESRD on HD  Nephrology following  Femoral shiley in place    Recommendations:  Continue caspofungin 50mg daily  Continue meropenem for now, will plan to de-escalate if Bcx negative for bacteria and clinical improvement  Follow repeat blood cultures  Obtain TTE  Ophthalmology evaluation  Would remove lines as able due to concern for potential seeding of infection  Follow fever curve and WBC count    Ricky Burt MD  Division of Infectious Diseases  Available on Teams                    62M CAD s/p CABG, DM, HTN, presented as a trasnfer from [place] for STEMI, found to have pancreatitis not necrotizing and cholecystitis on CT AP, admitted to to SICU for respiratory failure due to ARDS. Moved to the CTU for ECMO for cardiogenic shock vs ARDS, ECMO decannulated on 12/8 with IABP placement and mitral clip and removed on 12/17, s/p trac on 12/16/22, now on trach collar.    #Candidemia  Bcx positive for candida tropicalis on 12/26/22  Repeat with no growth to date  Limited TTE shows no evidence for valvular vegetations, mitral clip in place    #ESRD on HD  Nephrology following  Femoral shiley in place    Recommendations:  Continue caspofungin 50mg daily  Continue meropenem for now, will plan to de-escalate if Bcx negative for bacteria and clinical improvement  Follow repeat blood cultures  Obtain TTE  Ophthalmology evaluation  Would remove lines as able due to concern for potential seeding of infection  Follow fever curve and WBC count    Ricky Burt MD  Division of Infectious Diseases  Available on Teams                    62M CAD s/p CABG, DM, HTN, presented as a trasnfer from [place] for STEMI, found to have pancreatitis not necrotizing and cholecystitis on CT AP, admitted to to SICU for respiratory failure due to ARDS. Moved to the CTU for ECMO for cardiogenic shock vs ARDS, ECMO decannulated on 12/8 with IABP placement and mitral clip and removed on 12/17, s/p trac on 12/16/22, now on trach collar.    #Candidemia  Bcx positive for candida tropicalis on 12/26/22  Repeat with no growth to date  Limited TTE shows no evidence for valvular vegetations, mitral clip in place    #ESRD on HD  Nephrology following  Femoral shiley in place    Recommendations:  Continue caspofungin 50mg daily  Continue meropenem for now, will plan to de-escalate if Bcx negative for bacteria and clinical improvement  Follow repeat blood cultures  Obtain TTE  Ophthalmology evaluation  Would remove lines as able due to concern for potential seeding of infection  Follow fever curve and WBC count    Ricky Burt MD  Division of Infectious Diseases  Available on Teams                    62M CAD s/p CABG, DM, HTN, presented as a trasnfer from [place] for STEMI, found to have pancreatitis not necrotizing and cholecystitis on CT AP, admitted to to SICU for respiratory failure due to ARDS. Moved to the CTU for ECMO for cardiogenic shock vs ARDS, ECMO decannulated on 12/8 with IABP placement and mitral clip and removed on 12/17, s/p trac on 12/16/22, now on trach collar.    #Candidemia  Unclear source  Bcx positive for candida tropicalis on 12/26/22  Repeat with no growth to date  Limited TTE shows no evidence for valvular vegetations, mitral clip in place    #ESRD on HD  Nephrology following  Femoral shiley in place    Recommendations:  Continue caspofungin 50mg daily  Continue meropenem for now, will plan to de-escalate if Bcx negative for bacteria and clinical improvement  Follow repeat blood cultures  Will need additional TTE in the next two weeks  Ophthalmology evaluation  Would remove lines as able due to concern for potential seeding of infection  Follow fever curve and WBC count    Ricky Burt MD  Division of Infectious Diseases  Available on Teams                    62M CAD s/p CABG, DM, HTN, presented as a trasnfer from [place] for STEMI, found to have pancreatitis not necrotizing and cholecystitis on CT AP, admitted to to SICU for respiratory failure due to ARDS. Moved to the CTU for ECMO for cardiogenic shock vs ARDS, ECMO decannulated on 12/8 with IABP placement and mitral clip and removed on 12/17, s/p trac on 12/16/22, now on trach collar.    #Candidemia  Unclear source  Bcx positive for candida tropicalis on 12/26/22  Repeat with no growth to date  Limited TTE shows no evidence for valvular vegetations, mitral clip in place    #ESRD on HD  Nephrology following  Femoral shiley in place    Recommendations:  Continue caspofungin 50mg daily  Continue meropenem for now, will plan to de-escalate if Bcx negative for bacteria and clinical improvement  Follow repeat blood cultures  Will need additional TTE in the next two weeks  Ophthalmology evaluation  Would remove lines as able due to concern for potential seeding of infection  Follow fever curve and WBC count    Ricky Burt MD  Division of Infectious Diseases  Available on Teams                    62M CAD s/p CABG, DM, HTN, presented as a trasnfer from [place] for STEMI, found to have pancreatitis not necrotizing and cholecystitis on CT AP, admitted to to SICU for respiratory failure due to ARDS. Moved to the CTU for ECMO for cardiogenic shock vs ARDS, ECMO decannulated on 12/8 with IABP placement and mitral clip and removed on 12/17, s/p trac on 12/16/22, now on trach collar.    #Candidemia  Unclear source  Bcx positive for candida tropicalis on 12/26/22  Repeat with no growth to date  Limited TTE shows no evidence for valvular vegetations, mitral clip in place    #ESRD on HD  Nephrology following  Femoral shiley in place    Recommendations:  Continue caspofungin 50mg daily  Continue meropenem for now, will plan to de-escalate if Bcx negative for bacteria and clinical improvement  Follow repeat blood cultures  Will need additional TTE in the next two weeks  Ophthalmology evaluation  Would remove lines as able due to concern for potential seeding of infection  Follow fever curve and WBC count    Ricky Burt MD  Division of Infectious Diseases  Available on Teams

## 2022-12-29 NOTE — CHART NOTE - NSCHARTNOTEFT_GEN_A_CORE
Nutrition Follow Up Note  Patient seen for: malnutrition follow up.     Chart reviewed, events noted. Pt is a 63 y/o male with PMH of CABG, DM, HTN, HLD presenting as transfer from [place] with STEMI. Cardiogenic shock s/p [facility] ECMO decannulated . Mitral regurgitation s/p Mitral clip x1 22. Acute respiratory distress/failure s/p Tracheostomy 22. IABP placed (IABP dc'ed ).    Source: [] Patient       [x] EMR        [x] RN        [] Family at bedside       [] Other:    -If unable to interview patient: [x] Trach/Vent/BiPAP  [] Disoriented/confused/inappropriate to interview    Diet Order:   Diet, NPO with Tube Feed:   Tube Feeding Modality: Nasogastric  Glucerna 1.2 Johnathon (GLUCERNARTH)  Total Volume for 24 Hours (mL): 1560  Continuous  Starting Tube Feed Rate {mL per Hour}: 30  Increase Tube Feed Rate by (mL): 10     Every hour  Until Goal Tube Feed Rate (mL per Hour): 65  Tube Feed Duration (in Hours): 24  Tube Feed Start Time: 14:25  No Carb Prosource (1pkg = 15gms Protein)     Qty per Day:  2 (22)    EN Order Provides: 1560mL total volume, 1872kcal, 94g protein, 1256mL free water.  Modular Components: No Carb Prosouce x 2 = 120kcal, 30g protein  Current Pump Rate: 65mL/hr  EN provision: (per flow sheets)  5-day EN average: 1534mL or 98% goal volume    Is current diet order appropriate/adequate? [] Yes  [x]  No: See recommendations below.    Nutrition-related concerns:   - Pt currently tolerating TC this AM, previously on CPAP. Pt remains with NGT.    - Insulin gtt running @ 2.5mL/hr for BG management. Ordered for NPH 5 units q6H and SSI.   - ERIC, previously on CRRT (d/c ), now receiving iHD per Nephrology. Last HD , -2L. Phos elevated this AM.    - Acute pancreatitis on admission with elevated lipase, amylase. Last labs drawn .    - Micronutrient supplementation: multivitamin, thiamine     GI:  Last BM 12/29 x 1, 12/28 x 2.   Bowel Regimen? [x] Yes   [] No   - Antibiotic regimen noted    Weights:   Daily Weight in k.2 (12-29), Weight in k.3 (12-28), Weight in k.4 (12-27), Weight in k.2 (12-26), Weight in k.9 (12-25), Weight in k.3 (12-24), Weight in k.3 (12-23)   - weight fluctuations noted, pt receiving iHD likely resulting in fluid shifts - will continue to monitor    MEDICATIONS  (STANDING):  aMIOdarone    Tablet 200 milliGRAM(s) Oral daily  aspirin  chewable 81 milliGRAM(s) Oral daily  atorvastatin 80 milliGRAM(s) Oral at bedtime  caspofungin IVPB 50 milliGRAM(s) IV Intermittent every 24 hours  caspofungin IVPB      chlorhexidine 0.12% Liquid 15 milliLiter(s) Oral Mucosa every 12 hours  chlorhexidine 2% Cloths 1 Application(s) Topical <User Schedule>  dextrose 5%. 1000 milliLiter(s) (50 mL/Hr) IV Continuous <Continuous>  dextrose 5%. 1000 milliLiter(s) (100 mL/Hr) IV Continuous <Continuous>  dextrose 50% Injectable 50 milliLiter(s) IV Push every 15 minutes  glucagon  Injectable 1 milliGRAM(s) IntraMuscular once  heparin   Injectable 5000 Unit(s) SubCutaneous every 8 hours  insulin lispro (ADMELOG) corrective regimen sliding scale   SubCutaneous every 6 hours  insulin NPH human recombinant 5 Unit(s) SubCutaneous every 6 hours  insulin regular Infusion 2 Unit(s)/Hr (2 mL/Hr) IV Continuous <Continuous>  meropenem  IVPB 500 milliGRAM(s) IV Intermittent <User Schedule>  multivitamin/minerals/iron Oral Solution (CENTRUM) 15 milliLiter(s) Oral daily  pantoprazole  Injectable 40 milliGRAM(s) IV Push daily  senna 2 Tablet(s) Oral at bedtime  thiamine 100 milliGRAM(s) Enteral Tube daily    MEDICATIONS  (PRN):  acetaminophen    Suspension .. 650 milliGRAM(s) Oral every 6 hours PRN Temp greater or equal to 38.5C (101.3F)  dextrose Oral Gel 15 Gram(s) Oral once PRN Blood Glucose LESS THAN 70 milliGRAM(s)/deciliter  sodium chloride 0.9% lock flush 10 milliLiter(s) IV Push every 1 hour PRN Pre/post blood products, medications, blood draw, and to maintain line patency    Pertinent Labs:  @ 00:35: Na 137, BUN 84<H>, Cr 4.64<H>, <H>, K+ 4.5, Phos 5.8<H>, Mg 2.6, Alk Phos 88, ALT/SGPT 21, AST/SGOT 30, HbA1c --    A1C with Estimated Average Glucose Result: 7.3 % (22 @ 03:58)    Finger Sticks:  POCT Blood Glucose.: 149 mg/dL ( @ 08:09)  POCT Blood Glucose.: 148 mg/dL ( @ 06:51)  POCT Blood Glucose.: 141 mg/dL ( @ 05:59)  POCT Blood Glucose.: 150 mg/dL ( @ 05:02)  POCT Blood Glucose.: 133 mg/dL ( @ 04:10)  POCT Blood Glucose.: 131 mg/dL ( @ 03:11)  POCT Blood Glucose.: 140 mg/dL ( @ 02:00)  POCT Blood Glucose.: 130 mg/dL ( @ 01:08)  POCT Blood Glucose.: 132 mg/dL ( @ 00:01)  POCT Blood Glucose.: 131 mg/dL ( @ 23:04)  POCT Blood Glucose.: 130 mg/dL ( @ 22:06)  POCT Blood Glucose.: 150 mg/dL ( @ 21:02)  POCT Blood Glucose.: 156 mg/dL ( @ 20:05)  POCT Blood Glucose.: 131 mg/dL ( @ 18:58)  POCT Blood Glucose.: 108 mg/dL ( @ 17:49)  POCT Blood Glucose.: 106 mg/dL ( @ 17:10)  POCT Blood Glucose.: 98 mg/dL ( @ 15:52)  POCT Blood Glucose.: 112 mg/dL ( @ 14:55)  POCT Blood Glucose.: 121 mg/dL ( @ 14:01)  POCT Blood Glucose.: 141 mg/dL (12-28 @ 13:02)  POCT Blood Glucose.: 126 mg/dL ( @ 11:46)  POCT Blood Glucose.: 132 mg/dL ( @ 10:54)    Skin per nursing documentation: No noted pressure injuries as per documentation.   Edema: 1+ generalized,  2+ left foot; right foot    Estimated Energy Needs: 2000-2442kcal (23-28kcal/kg)  Estimated Protein Needs: 104-122g protein (1.2-1.4g/kg)  Defer fluids to team.   Needs based on lowest daily wt 87.2kg () for energy & protein    Previous Nutrition Diagnosis: Severe acute malnutrition & Increased nutrient needs  Nutrition Diagnosis is: [x] ongoing  [] resolved [] not applicable     New Nutrition Diagnosis: [x] Not applicable    Nutrition Care Plan:  [x] In Progress - being addressed with EN   [] Achieved  [] Not applicable    Nutrition Interventions:     Education Provided:       [] Yes:  [x] No: N/A at this time.    Recommendations:  1. Recommend increase enteral feeds - Glucerna 1.5 with NEW goal rate 70mL/hr x 24hr + No Carb Prosource TF x 2 to provide 1680mL total volume, 2096kcal, 123g protein, 1352mL free water. Regimen in full meets 24kcal/kg, 1.4g/kg protein based on lowest daily weight 87.2kg.   -- Free water flushes per team.  -- Monitor need for renal formula.  2. Recommend change multivitamin to Nephro-Juvencio, continue thiamine supplementation.  3. Monitor GI tolerance to feeds, RD remains available to adjust TF regimen/formulary as needed/upon request.     Monitoring and Evaluation:   Continue to monitor nutritional intake, tolerance to diet prescription, weights, labs, skin integrity    RD remains available upon request and will follow up per protocol    Mala Han MS, RD, CDN, [facility] Pager #[phone] Nutrition Follow Up Note  Patient seen for: malnutrition follow up.     Chart reviewed, events noted. Pt is a 61 y/o male with PMH of CABG, DM, HTN, HLD presenting as transfer from [place] with STEMI. Cardiogenic shock s/p [facility] ECMO decannulated . Mitral regurgitation s/p Mitral clip x1 22. Acute respiratory distress/failure s/p Tracheostomy 22. IABP placed (IABP dc'ed ).    Source: [] Patient       [x] EMR        [x] RN        [] Family at bedside       [] Other:    -If unable to interview patient: [x] Trach/Vent/BiPAP  [] Disoriented/confused/inappropriate to interview    Diet Order:   Diet, NPO with Tube Feed:   Tube Feeding Modality: Nasogastric  Glucerna 1.2 Johnathon (GLUCERNARTH)  Total Volume for 24 Hours (mL): 1560  Continuous  Starting Tube Feed Rate {mL per Hour}: 30  Increase Tube Feed Rate by (mL): 10     Every hour  Until Goal Tube Feed Rate (mL per Hour): 65  Tube Feed Duration (in Hours): 24  Tube Feed Start Time: 14:25  No Carb Prosource (1pkg = 15gms Protein)     Qty per Day:  2 (22)    EN Order Provides: 1560mL total volume, 1872kcal, 94g protein, 1256mL free water.  Modular Components: No Carb Prosouce x 2 = 120kcal, 30g protein  Current Pump Rate: 65mL/hr  EN provision: (per flow sheets)  5-day EN average: 1534mL or 98% goal volume    Is current diet order appropriate/adequate? [] Yes  [x]  No: See recommendations below.    Nutrition-related concerns:   - Pt currently tolerating TC this AM, previously on CPAP. Pt remains with NGT.    - Insulin gtt running @ 2.5mL/hr for BG management. Ordered for NPH 5 units q6H and SSI.   - ERIC, previously on CRRT (d/c ), now receiving iHD per Nephrology. Last HD , -2L. Phos elevated this AM.    - Acute pancreatitis on admission with elevated lipase, amylase. Last labs drawn .    - Micronutrient supplementation: multivitamin, thiamine     GI:  Last BM 12/29 x 1, 12/28 x 2.   Bowel Regimen? [x] Yes   [] No   - Antibiotic regimen noted    Weights:   Daily Weight in k.2 (12-29), Weight in k.3 (12-28), Weight in k.4 (12-27), Weight in k.2 (12-26), Weight in k.9 (12-25), Weight in k.3 (12-24), Weight in k.3 (12-23)   - weight fluctuations noted, pt receiving iHD likely resulting in fluid shifts - will continue to monitor    MEDICATIONS  (STANDING):  aMIOdarone    Tablet 200 milliGRAM(s) Oral daily  aspirin  chewable 81 milliGRAM(s) Oral daily  atorvastatin 80 milliGRAM(s) Oral at bedtime  caspofungin IVPB 50 milliGRAM(s) IV Intermittent every 24 hours  caspofungin IVPB      chlorhexidine 0.12% Liquid 15 milliLiter(s) Oral Mucosa every 12 hours  chlorhexidine 2% Cloths 1 Application(s) Topical <User Schedule>  dextrose 5%. 1000 milliLiter(s) (50 mL/Hr) IV Continuous <Continuous>  dextrose 5%. 1000 milliLiter(s) (100 mL/Hr) IV Continuous <Continuous>  dextrose 50% Injectable 50 milliLiter(s) IV Push every 15 minutes  glucagon  Injectable 1 milliGRAM(s) IntraMuscular once  heparin   Injectable 5000 Unit(s) SubCutaneous every 8 hours  insulin lispro (ADMELOG) corrective regimen sliding scale   SubCutaneous every 6 hours  insulin NPH human recombinant 5 Unit(s) SubCutaneous every 6 hours  insulin regular Infusion 2 Unit(s)/Hr (2 mL/Hr) IV Continuous <Continuous>  meropenem  IVPB 500 milliGRAM(s) IV Intermittent <User Schedule>  multivitamin/minerals/iron Oral Solution (CENTRUM) 15 milliLiter(s) Oral daily  pantoprazole  Injectable 40 milliGRAM(s) IV Push daily  senna 2 Tablet(s) Oral at bedtime  thiamine 100 milliGRAM(s) Enteral Tube daily    MEDICATIONS  (PRN):  acetaminophen    Suspension .. 650 milliGRAM(s) Oral every 6 hours PRN Temp greater or equal to 38.5C (101.3F)  dextrose Oral Gel 15 Gram(s) Oral once PRN Blood Glucose LESS THAN 70 milliGRAM(s)/deciliter  sodium chloride 0.9% lock flush 10 milliLiter(s) IV Push every 1 hour PRN Pre/post blood products, medications, blood draw, and to maintain line patency    Pertinent Labs:  @ 00:35: Na 137, BUN 84<H>, Cr 4.64<H>, <H>, K+ 4.5, Phos 5.8<H>, Mg 2.6, Alk Phos 88, ALT/SGPT 21, AST/SGOT 30, HbA1c --    A1C with Estimated Average Glucose Result: 7.3 % (22 @ 03:58)    Finger Sticks:  POCT Blood Glucose.: 149 mg/dL ( @ 08:09)  POCT Blood Glucose.: 148 mg/dL ( @ 06:51)  POCT Blood Glucose.: 141 mg/dL ( @ 05:59)  POCT Blood Glucose.: 150 mg/dL ( @ 05:02)  POCT Blood Glucose.: 133 mg/dL ( @ 04:10)  POCT Blood Glucose.: 131 mg/dL ( @ 03:11)  POCT Blood Glucose.: 140 mg/dL ( @ 02:00)  POCT Blood Glucose.: 130 mg/dL ( @ 01:08)  POCT Blood Glucose.: 132 mg/dL ( @ 00:01)  POCT Blood Glucose.: 131 mg/dL ( @ 23:04)  POCT Blood Glucose.: 130 mg/dL ( @ 22:06)  POCT Blood Glucose.: 150 mg/dL ( @ 21:02)  POCT Blood Glucose.: 156 mg/dL ( @ 20:05)  POCT Blood Glucose.: 131 mg/dL ( @ 18:58)  POCT Blood Glucose.: 108 mg/dL ( @ 17:49)  POCT Blood Glucose.: 106 mg/dL ( @ 17:10)  POCT Blood Glucose.: 98 mg/dL ( @ 15:52)  POCT Blood Glucose.: 112 mg/dL ( @ 14:55)  POCT Blood Glucose.: 121 mg/dL ( @ 14:01)  POCT Blood Glucose.: 141 mg/dL (12-28 @ 13:02)  POCT Blood Glucose.: 126 mg/dL ( @ 11:46)  POCT Blood Glucose.: 132 mg/dL ( @ 10:54)    Skin per nursing documentation: No noted pressure injuries as per documentation.   Edema: 1+ generalized,  2+ left foot; right foot    Estimated Energy Needs: 2000-2442kcal (23-28kcal/kg)  Estimated Protein Needs: 104-122g protein (1.2-1.4g/kg)  Defer fluids to team.   Needs based on lowest daily wt 87.2kg () for energy & protein    Previous Nutrition Diagnosis: Severe acute malnutrition & Increased nutrient needs  Nutrition Diagnosis is: [x] ongoing  [] resolved [] not applicable     New Nutrition Diagnosis: [x] Not applicable    Nutrition Care Plan:  [x] In Progress - being addressed with EN   [] Achieved  [] Not applicable    Nutrition Interventions:     Education Provided:       [] Yes:  [x] No: N/A at this time.    Recommendations:  1. Recommend increase enteral feeds - Glucerna 1.5 with NEW goal rate 70mL/hr x 24hr + No Carb Prosource TF x 2 to provide 1680mL total volume, 2096kcal, 123g protein, 1352mL free water. Regimen in full meets 24kcal/kg, 1.4g/kg protein based on lowest daily weight 87.2kg.   -- Free water flushes per team.  -- Monitor need for renal formula.  2. Recommend change multivitamin to Nephro-Juvencio, continue thiamine supplementation.  3. Monitor GI tolerance to feeds, RD remains available to adjust TF regimen/formulary as needed/upon request.     Monitoring and Evaluation:   Continue to monitor nutritional intake, tolerance to diet prescription, weights, labs, skin integrity    RD remains available upon request and will follow up per protocol    Mala Han MS, RD, CDN, [facility] Pager #[phone] Nutrition Follow Up Note  Patient seen for: malnutrition follow up.     Chart reviewed, events noted. Pt is a 63 y/o male with PMH of CABG, DM, HTN, HLD presenting as transfer from [place] with STEMI. Cardiogenic shock s/p [facility] ECMO decannulated . Mitral regurgitation s/p Mitral clip x1 22. Acute respiratory distress/failure s/p Tracheostomy 22. IABP placed (IABP dc'ed ).    Source: [] Patient       [x] EMR        [x] RN        [] Family at bedside       [] Other:    -If unable to interview patient: [x] Trach/Vent/BiPAP  [] Disoriented/confused/inappropriate to interview    Diet Order:   Diet, NPO with Tube Feed:   Tube Feeding Modality: Nasogastric  Glucerna 1.2 Johnathon (GLUCERNARTH)  Total Volume for 24 Hours (mL): 1560  Continuous  Starting Tube Feed Rate {mL per Hour}: 30  Increase Tube Feed Rate by (mL): 10     Every hour  Until Goal Tube Feed Rate (mL per Hour): 65  Tube Feed Duration (in Hours): 24  Tube Feed Start Time: 14:25  No Carb Prosource (1pkg = 15gms Protein)     Qty per Day:  2 (22)    EN Order Provides: 1560mL total volume, 1872kcal, 94g protein, 1256mL free water.  Modular Components: No Carb Prosouce x 2 = 120kcal, 30g protein  Current Pump Rate: 65mL/hr  EN provision: (per flow sheets)  5-day EN average: 1534mL or 98% goal volume    Is current diet order appropriate/adequate? [] Yes  [x]  No: See recommendations below.    Nutrition-related concerns:   - Pt currently tolerating TC this AM, previously on CPAP. Pt remains with NGT.    - Insulin gtt running @ 2.5mL/hr for BG management. Ordered for NPH 5 units q6H and SSI.   - EIRC, previously on CRRT (d/c ), now receiving iHD per Nephrology. Last HD , -2L. Phos elevated this AM.    - Acute pancreatitis on admission with elevated lipase, amylase. Last labs drawn .    - Micronutrient supplementation: multivitamin, thiamine     GI:  Last BM 12/29 x 1, 12/28 x 2.   Bowel Regimen? [x] Yes   [] No   - Antibiotic regimen noted    Weights:   Daily Weight in k.2 (12-29), Weight in k.3 (12-28), Weight in k.4 (12-27), Weight in k.2 (12-26), Weight in k.9 (12-25), Weight in k.3 (12-24), Weight in k.3 (12-23)   - weight fluctuations noted, pt receiving iHD likely resulting in fluid shifts - will continue to monitor    MEDICATIONS  (STANDING):  aMIOdarone    Tablet 200 milliGRAM(s) Oral daily  aspirin  chewable 81 milliGRAM(s) Oral daily  atorvastatin 80 milliGRAM(s) Oral at bedtime  caspofungin IVPB 50 milliGRAM(s) IV Intermittent every 24 hours  caspofungin IVPB      chlorhexidine 0.12% Liquid 15 milliLiter(s) Oral Mucosa every 12 hours  chlorhexidine 2% Cloths 1 Application(s) Topical <User Schedule>  dextrose 5%. 1000 milliLiter(s) (50 mL/Hr) IV Continuous <Continuous>  dextrose 5%. 1000 milliLiter(s) (100 mL/Hr) IV Continuous <Continuous>  dextrose 50% Injectable 50 milliLiter(s) IV Push every 15 minutes  glucagon  Injectable 1 milliGRAM(s) IntraMuscular once  heparin   Injectable 5000 Unit(s) SubCutaneous every 8 hours  insulin lispro (ADMELOG) corrective regimen sliding scale   SubCutaneous every 6 hours  insulin NPH human recombinant 5 Unit(s) SubCutaneous every 6 hours  insulin regular Infusion 2 Unit(s)/Hr (2 mL/Hr) IV Continuous <Continuous>  meropenem  IVPB 500 milliGRAM(s) IV Intermittent <User Schedule>  multivitamin/minerals/iron Oral Solution (CENTRUM) 15 milliLiter(s) Oral daily  pantoprazole  Injectable 40 milliGRAM(s) IV Push daily  senna 2 Tablet(s) Oral at bedtime  thiamine 100 milliGRAM(s) Enteral Tube daily    MEDICATIONS  (PRN):  acetaminophen    Suspension .. 650 milliGRAM(s) Oral every 6 hours PRN Temp greater or equal to 38.5C (101.3F)  dextrose Oral Gel 15 Gram(s) Oral once PRN Blood Glucose LESS THAN 70 milliGRAM(s)/deciliter  sodium chloride 0.9% lock flush 10 milliLiter(s) IV Push every 1 hour PRN Pre/post blood products, medications, blood draw, and to maintain line patency    Pertinent Labs:  @ 00:35: Na 137, BUN 84<H>, Cr 4.64<H>, <H>, K+ 4.5, Phos 5.8<H>, Mg 2.6, Alk Phos 88, ALT/SGPT 21, AST/SGOT 30, HbA1c --    A1C with Estimated Average Glucose Result: 7.3 % (22 @ 03:58)    Finger Sticks:  POCT Blood Glucose.: 149 mg/dL ( @ 08:09)  POCT Blood Glucose.: 148 mg/dL ( @ 06:51)  POCT Blood Glucose.: 141 mg/dL ( @ 05:59)  POCT Blood Glucose.: 150 mg/dL ( @ 05:02)  POCT Blood Glucose.: 133 mg/dL ( @ 04:10)  POCT Blood Glucose.: 131 mg/dL ( @ 03:11)  POCT Blood Glucose.: 140 mg/dL ( @ 02:00)  POCT Blood Glucose.: 130 mg/dL ( @ 01:08)  POCT Blood Glucose.: 132 mg/dL ( @ 00:01)  POCT Blood Glucose.: 131 mg/dL ( @ 23:04)  POCT Blood Glucose.: 130 mg/dL ( @ 22:06)  POCT Blood Glucose.: 150 mg/dL ( @ 21:02)  POCT Blood Glucose.: 156 mg/dL ( @ 20:05)  POCT Blood Glucose.: 131 mg/dL ( @ 18:58)  POCT Blood Glucose.: 108 mg/dL ( @ 17:49)  POCT Blood Glucose.: 106 mg/dL ( @ 17:10)  POCT Blood Glucose.: 98 mg/dL ( @ 15:52)  POCT Blood Glucose.: 112 mg/dL ( @ 14:55)  POCT Blood Glucose.: 121 mg/dL ( @ 14:01)  POCT Blood Glucose.: 141 mg/dL (12-28 @ 13:02)  POCT Blood Glucose.: 126 mg/dL ( @ 11:46)  POCT Blood Glucose.: 132 mg/dL ( @ 10:54)    Skin per nursing documentation: No noted pressure injuries as per documentation.   Edema: 1+ generalized,  2+ left foot; right foot    Estimated Energy Needs: 2000-2442kcal (23-28kcal/kg)  Estimated Protein Needs: 104-122g protein (1.2-1.4g/kg)  Defer fluids to team.   Needs based on lowest daily wt 87.2kg () for energy & protein    Previous Nutrition Diagnosis: Severe acute malnutrition & Increased nutrient needs  Nutrition Diagnosis is: [x] ongoing  [] resolved [] not applicable     New Nutrition Diagnosis: [x] Not applicable    Nutrition Care Plan:  [x] In Progress - being addressed with EN   [] Achieved  [] Not applicable    Nutrition Interventions:     Education Provided:       [] Yes:  [x] No: N/A at this time.    Recommendations:  1. Recommend increase enteral feeds - Glucerna 1.5 with NEW goal rate 70mL/hr x 24hr + No Carb Prosource TF x 2 to provide 1680mL total volume, 2096kcal, 123g protein, 1352mL free water. Regimen in full meets 24kcal/kg, 1.4g/kg protein based on lowest daily weight 87.2kg.   -- Free water flushes per team.  -- Monitor need for renal formula.  2. Recommend change multivitamin to Nephro-Juvencio, continue thiamine supplementation.  3. Monitor GI tolerance to feeds, RD remains available to adjust TF regimen/formulary as needed/upon request.     Monitoring and Evaluation:   Continue to monitor nutritional intake, tolerance to diet prescription, weights, labs, skin integrity    RD remains available upon request and will follow up per protocol    Mala Han MS, RD, CDN, [facility] Pager #[phone] Nutrition Follow Up Note  Patient seen for: malnutrition follow up.     Chart reviewed, events noted. Pt is a 61 y/o male with PMH of CABG, DM, HTN, HLD presenting as transfer from [place] with STEMI. Cardiogenic shock s/p VA ECMO decannulated . Mitral regurgitation s/p Mitral clip x1 22. Acute respiratory distress/failure s/p Tracheostomy 22. IABP placed (IABP dc'ed ).    Source: [] Patient       [x] EMR        [x] RN        [] Family at bedside       [] Other:    -If unable to interview patient: [x] Trach/Vent/BiPAP  [] Disoriented/confused/inappropriate to interview    Diet Order:   Diet, NPO with Tube Feed:   Tube Feeding Modality: Nasogastric  Glucerna 1.2 Johnathon (GLUCERNARTH)  Total Volume for 24 Hours (mL): 1560  Continuous  Starting Tube Feed Rate {mL per Hour}: 30  Increase Tube Feed Rate by (mL): 10     Every hour  Until Goal Tube Feed Rate (mL per Hour): 65  Tube Feed Duration (in Hours): 24  Tube Feed Start Time: 14:25  No Carb Prosource (1pkg = 15gms Protein)     Qty per Day:  2 (22)    EN Order Provides: 1560mL total volume, 1872kcal, 94g protein, 1256mL free water.  Modular Components: No Carb Prosouce x 2 = 120kcal, 30g protein  Current Pump Rate: 65mL/hr  EN provision: (per flow sheets)  5-day EN average: 1534mL or 98% goal volume    Is current diet order appropriate/adequate? [] Yes  [x]  No: See recommendations below.    Nutrition-related concerns:   - Pt currently tolerating TC this AM, previously on CPAP. Pt remains with NGT.    - Insulin gtt running @ 2.5mL/hr for BG management. Ordered for NPH 5 units q6H and SSI.   - ERIC, previously on CRRT (d/c ), now receiving iHD per Nephrology. Last HD , -2L. Phos elevated this AM.    - Acute pancreatitis on admission with elevated lipase, amylase. Last labs drawn .    - Micronutrient supplementation: multivitamin, thiamine     GI:  Last BM 12/29 x 1, 12/28 x 2.   Bowel Regimen? [x] Yes   [] No   - Antibiotic regimen noted    Weights:   Daily Weight in k.2 (12-29), Weight in k.3 (12-28), Weight in k.4 (12-27), Weight in k.2 (12-26), Weight in k.9 (12-25), Weight in k.3 (12-24), Weight in k.3 (12-23)   - weight fluctuations noted, pt receiving iHD likely resulting in fluid shifts - will continue to monitor    MEDICATIONS  (STANDING):  aMIOdarone    Tablet 200 milliGRAM(s) Oral daily  aspirin  chewable 81 milliGRAM(s) Oral daily  atorvastatin 80 milliGRAM(s) Oral at bedtime  caspofungin IVPB 50 milliGRAM(s) IV Intermittent every 24 hours  caspofungin IVPB      chlorhexidine 0.12% Liquid 15 milliLiter(s) Oral Mucosa every 12 hours  chlorhexidine 2% Cloths 1 Application(s) Topical <User Schedule>  dextrose 5%. 1000 milliLiter(s) (50 mL/Hr) IV Continuous <Continuous>  dextrose 5%. 1000 milliLiter(s) (100 mL/Hr) IV Continuous <Continuous>  dextrose 50% Injectable 50 milliLiter(s) IV Push every 15 minutes  glucagon  Injectable 1 milliGRAM(s) IntraMuscular once  heparin   Injectable 5000 Unit(s) SubCutaneous every 8 hours  insulin lispro (ADMELOG) corrective regimen sliding scale   SubCutaneous every 6 hours  insulin NPH human recombinant 5 Unit(s) SubCutaneous every 6 hours  insulin regular Infusion 2 Unit(s)/Hr (2 mL/Hr) IV Continuous <Continuous>  meropenem  IVPB 500 milliGRAM(s) IV Intermittent <User Schedule>  multivitamin/minerals/iron Oral Solution (CENTRUM) 15 milliLiter(s) Oral daily  pantoprazole  Injectable 40 milliGRAM(s) IV Push daily  senna 2 Tablet(s) Oral at bedtime  thiamine 100 milliGRAM(s) Enteral Tube daily    MEDICATIONS  (PRN):  acetaminophen    Suspension .. 650 milliGRAM(s) Oral every 6 hours PRN Temp greater or equal to 38.5C (101.3F)  dextrose Oral Gel 15 Gram(s) Oral once PRN Blood Glucose LESS THAN 70 milliGRAM(s)/deciliter  sodium chloride 0.9% lock flush 10 milliLiter(s) IV Push every 1 hour PRN Pre/post blood products, medications, blood draw, and to maintain line patency    Pertinent Labs:  @ 00:35: Na 137, BUN 84<H>, Cr 4.64<H>, <H>, K+ 4.5, Phos 5.8<H>, Mg 2.6, Alk Phos 88, ALT/SGPT 21, AST/SGOT 30, HbA1c --    A1C with Estimated Average Glucose Result: 7.3 % (22 @ 03:58)    Finger Sticks:  POCT Blood Glucose.: 149 mg/dL ( @ 08:09)  POCT Blood Glucose.: 148 mg/dL ( @ 06:51)  POCT Blood Glucose.: 141 mg/dL ( @ 05:59)  POCT Blood Glucose.: 150 mg/dL ( @ 05:02)  POCT Blood Glucose.: 133 mg/dL ( @ 04:10)  POCT Blood Glucose.: 131 mg/dL ( @ 03:11)  POCT Blood Glucose.: 140 mg/dL ( @ 02:00)  POCT Blood Glucose.: 130 mg/dL ( @ 01:08)  POCT Blood Glucose.: 132 mg/dL ( @ 00:01)  POCT Blood Glucose.: 131 mg/dL ( @ 23:04)  POCT Blood Glucose.: 130 mg/dL ( @ 22:06)  POCT Blood Glucose.: 150 mg/dL ( @ 21:02)  POCT Blood Glucose.: 156 mg/dL ( @ 20:05)  POCT Blood Glucose.: 131 mg/dL ( @ 18:58)  POCT Blood Glucose.: 108 mg/dL ( @ 17:49)  POCT Blood Glucose.: 106 mg/dL ( @ 17:10)  POCT Blood Glucose.: 98 mg/dL ( @ 15:52)  POCT Blood Glucose.: 112 mg/dL ( @ 14:55)  POCT Blood Glucose.: 121 mg/dL ( @ 14:01)  POCT Blood Glucose.: 141 mg/dL (12-28 @ 13:02)  POCT Blood Glucose.: 126 mg/dL ( @ 11:46)  POCT Blood Glucose.: 132 mg/dL ( @ 10:54)    Skin per nursing documentation: No noted pressure injuries as per documentation.   Edema: 1+ generalized,  2+ left foot; right foot    Estimated Energy Needs: 2000-2442kcal (23-28kcal/kg)  Estimated Protein Needs: 104-122g protein (1.2-1.4g/kg)  Defer fluids to team.   Needs based on lowest daily wt 87.2kg () for energy & protein    Previous Nutrition Diagnosis: Severe acute malnutrition & Increased nutrient needs  Nutrition Diagnosis is: [x] ongoing  [] resolved [] not applicable     New Nutrition Diagnosis: [x] Not applicable    Nutrition Care Plan:  [x] In Progress - being addressed with EN   [] Achieved  [] Not applicable    Nutrition Interventions:     Education Provided:       [] Yes:  [x] No: N/A at this time.    Recommendations:  1. Recommend increase enteral feeds - Glucerna 1.2 with NEW goal rate 70mL/hr x 24hr + No Carb Prosource TF x 2 to provide 1680mL total volume, 2096kcal, 123g protein, 1352mL free water. Regimen in full meets 24kcal/kg, 1.4g/kg protein based on lowest daily weight 87.2kg.   -- Free water flushes per team.  -- Monitor need for renal formula.  2. Recommend change multivitamin to Nephro-Juvencio, continue thiamine supplementation.  3. Monitor GI tolerance to feeds, RD remains available to adjust TF regimen/formulary as needed/upon request.     Monitoring and Evaluation:   Continue to monitor nutritional intake, tolerance to diet prescription, weights, labs, skin integrity    RD remains available upon request and will follow up per protocol    Mala Han MS, RD, CDN, [facility] Pager #[phone] Nutrition Follow Up Note  Patient seen for: malnutrition follow up.     Chart reviewed, events noted. Pt is a 61 y/o male with PMH of CABG, DM, HTN, HLD presenting as transfer from [place] with STEMI. Cardiogenic shock s/p [facility] ECMO decannulated . Mitral regurgitation s/p Mitral clip x1 22. Acute respiratory distress/failure s/p Tracheostomy 22. IABP placed (IABP dc'ed ).    Source: [] Patient       [x] EMR        [x] RN        [] Family at bedside       [] Other:    -If unable to interview patient: [x] Trach/Vent/BiPAP  [] Disoriented/confused/inappropriate to interview    Diet Order:   Diet, NPO with Tube Feed:   Tube Feeding Modality: Nasogastric  Glucerna 1.2 Johnathon (GLUCERNARTH)  Total Volume for 24 Hours (mL): 1560  Continuous  Starting Tube Feed Rate {mL per Hour}: 30  Increase Tube Feed Rate by (mL): 10     Every hour  Until Goal Tube Feed Rate (mL per Hour): 65  Tube Feed Duration (in Hours): 24  Tube Feed Start Time: 14:25  No Carb Prosource (1pkg = 15gms Protein)     Qty per Day:  2 (22)    EN Order Provides: 1560mL total volume, 1872kcal, 94g protein, 1256mL free water.  Modular Components: No Carb Prosouce x 2 = 120kcal, 30g protein  Current Pump Rate: 65mL/hr  EN provision: (per flow sheets)  5-day EN average: 1534mL or 98% goal volume    Is current diet order appropriate/adequate? [] Yes  [x]  No: See recommendations below.    Nutrition-related concerns:   - Pt currently tolerating TC this AM, previously on CPAP. Pt remains with NGT.    - Insulin gtt running @ 2.5mL/hr for BG management. Ordered for NPH 5 units q6H and SSI.   - ERIC, previously on CRRT (d/c ), now receiving iHD per Nephrology. Last HD , -2L. Phos elevated this AM.    - Acute pancreatitis on admission with elevated lipase, amylase. Last labs drawn .    - Micronutrient supplementation: multivitamin, thiamine     GI:  Last BM 12/29 x 1, 12/28 x 2.   Bowel Regimen? [x] Yes   [] No   - Antibiotic regimen noted    Weights:   Daily Weight in k.2 (12-29), Weight in k.3 (12-28), Weight in k.4 (12-27), Weight in k.2 (12-26), Weight in k.9 (12-25), Weight in k.3 (12-24), Weight in k.3 (12-23)   - weight fluctuations noted, pt receiving iHD likely resulting in fluid shifts - will continue to monitor    MEDICATIONS  (STANDING):  aMIOdarone    Tablet 200 milliGRAM(s) Oral daily  aspirin  chewable 81 milliGRAM(s) Oral daily  atorvastatin 80 milliGRAM(s) Oral at bedtime  caspofungin IVPB 50 milliGRAM(s) IV Intermittent every 24 hours  caspofungin IVPB      chlorhexidine 0.12% Liquid 15 milliLiter(s) Oral Mucosa every 12 hours  chlorhexidine 2% Cloths 1 Application(s) Topical <User Schedule>  dextrose 5%. 1000 milliLiter(s) (50 mL/Hr) IV Continuous <Continuous>  dextrose 5%. 1000 milliLiter(s) (100 mL/Hr) IV Continuous <Continuous>  dextrose 50% Injectable 50 milliLiter(s) IV Push every 15 minutes  glucagon  Injectable 1 milliGRAM(s) IntraMuscular once  heparin   Injectable 5000 Unit(s) SubCutaneous every 8 hours  insulin lispro (ADMELOG) corrective regimen sliding scale   SubCutaneous every 6 hours  insulin NPH human recombinant 5 Unit(s) SubCutaneous every 6 hours  insulin regular Infusion 2 Unit(s)/Hr (2 mL/Hr) IV Continuous <Continuous>  meropenem  IVPB 500 milliGRAM(s) IV Intermittent <User Schedule>  multivitamin/minerals/iron Oral Solution (CENTRUM) 15 milliLiter(s) Oral daily  pantoprazole  Injectable 40 milliGRAM(s) IV Push daily  senna 2 Tablet(s) Oral at bedtime  thiamine 100 milliGRAM(s) Enteral Tube daily    MEDICATIONS  (PRN):  acetaminophen    Suspension .. 650 milliGRAM(s) Oral every 6 hours PRN Temp greater or equal to 38.5C (101.3F)  dextrose Oral Gel 15 Gram(s) Oral once PRN Blood Glucose LESS THAN 70 milliGRAM(s)/deciliter  sodium chloride 0.9% lock flush 10 milliLiter(s) IV Push every 1 hour PRN Pre/post blood products, medications, blood draw, and to maintain line patency    Pertinent Labs:  @ 00:35: Na 137, BUN 84<H>, Cr 4.64<H>, <H>, K+ 4.5, Phos 5.8<H>, Mg 2.6, Alk Phos 88, ALT/SGPT 21, AST/SGOT 30, HbA1c --    A1C with Estimated Average Glucose Result: 7.3 % (22 @ 03:58)    Finger Sticks:  POCT Blood Glucose.: 149 mg/dL ( @ 08:09)  POCT Blood Glucose.: 148 mg/dL ( @ 06:51)  POCT Blood Glucose.: 141 mg/dL ( @ 05:59)  POCT Blood Glucose.: 150 mg/dL ( @ 05:02)  POCT Blood Glucose.: 133 mg/dL ( @ 04:10)  POCT Blood Glucose.: 131 mg/dL ( @ 03:11)  POCT Blood Glucose.: 140 mg/dL ( @ 02:00)  POCT Blood Glucose.: 130 mg/dL ( @ 01:08)  POCT Blood Glucose.: 132 mg/dL ( @ 00:01)  POCT Blood Glucose.: 131 mg/dL ( @ 23:04)  POCT Blood Glucose.: 130 mg/dL ( @ 22:06)  POCT Blood Glucose.: 150 mg/dL ( @ 21:02)  POCT Blood Glucose.: 156 mg/dL ( @ 20:05)  POCT Blood Glucose.: 131 mg/dL ( @ 18:58)  POCT Blood Glucose.: 108 mg/dL ( @ 17:49)  POCT Blood Glucose.: 106 mg/dL ( @ 17:10)  POCT Blood Glucose.: 98 mg/dL ( @ 15:52)  POCT Blood Glucose.: 112 mg/dL ( @ 14:55)  POCT Blood Glucose.: 121 mg/dL ( @ 14:01)  POCT Blood Glucose.: 141 mg/dL (12-28 @ 13:02)  POCT Blood Glucose.: 126 mg/dL ( @ 11:46)  POCT Blood Glucose.: 132 mg/dL ( @ 10:54)    Skin per nursing documentation: No noted pressure injuries as per documentation.   Edema: 1+ generalized,  2+ left foot; right foot    Estimated Energy Needs: 2000-2442kcal (23-28kcal/kg)  Estimated Protein Needs: 104-122g protein (1.2-1.4g/kg)  Defer fluids to team.   Needs based on lowest daily wt 87.2kg () for energy & protein    Previous Nutrition Diagnosis: Severe acute malnutrition & Increased nutrient needs  Nutrition Diagnosis is: [x] ongoing  [] resolved [] not applicable     New Nutrition Diagnosis: [x] Not applicable    Nutrition Care Plan:  [x] In Progress - being addressed with EN   [] Achieved  [] Not applicable    Nutrition Interventions:     Education Provided:       [] Yes:  [x] No: N/A at this time.    Recommendations:  1. Recommend increase enteral feeds - Glucerna 1.2 with NEW goal rate 70mL/hr x 24hr + No Carb Prosource TF x 2 to provide 1680mL total volume, 2096kcal, 123g protein, 1352mL free water. Regimen in full meets 24kcal/kg, 1.4g/kg protein based on lowest daily weight 87.2kg.   -- Free water flushes per team.  -- Monitor need for renal formula.  2. Recommend change multivitamin to Nephro-Juvencio, continue thiamine supplementation.  3. Monitor GI tolerance to feeds, RD remains available to adjust TF regimen/formulary as needed/upon request.     Monitoring and Evaluation:   Continue to monitor nutritional intake, tolerance to diet prescription, weights, labs, skin integrity    RD remains available upon request and will follow up per protocol    Maal Han MS, RD, CDN, [facility] Pager #[phone] Nutrition Follow Up Note  Patient seen for: malnutrition follow up.     Chart reviewed, events noted. Pt is a 63 y/o male with PMH of CABG, DM, HTN, HLD presenting as transfer from [place] with STEMI. Cardiogenic shock s/p [facility] ECMO decannulated . Mitral regurgitation s/p Mitral clip x1 22. Acute respiratory distress/failure s/p Tracheostomy 22. IABP placed (IABP dc'ed ).    Source: [] Patient       [x] EMR        [x] RN        [] Family at bedside       [] Other:    -If unable to interview patient: [x] Trach/Vent/BiPAP  [] Disoriented/confused/inappropriate to interview    Diet Order:   Diet, NPO with Tube Feed:   Tube Feeding Modality: Nasogastric  Glucerna 1.2 Johnathon (GLUCERNARTH)  Total Volume for 24 Hours (mL): 1560  Continuous  Starting Tube Feed Rate {mL per Hour}: 30  Increase Tube Feed Rate by (mL): 10     Every hour  Until Goal Tube Feed Rate (mL per Hour): 65  Tube Feed Duration (in Hours): 24  Tube Feed Start Time: 14:25  No Carb Prosource (1pkg = 15gms Protein)     Qty per Day:  2 (22)    EN Order Provides: 1560mL total volume, 1872kcal, 94g protein, 1256mL free water.  Modular Components: No Carb Prosouce x 2 = 120kcal, 30g protein  Current Pump Rate: 65mL/hr  EN provision: (per flow sheets)  5-day EN average: 1534mL or 98% goal volume    Is current diet order appropriate/adequate? [] Yes  [x]  No: See recommendations below.    Nutrition-related concerns:   - Pt currently tolerating TC this AM, previously on CPAP. Pt remains with NGT.    - Insulin gtt running @ 2.5mL/hr for BG management. Ordered for NPH 5 units q6H and SSI.   - ERIC, previously on CRRT (d/c ), now receiving iHD per Nephrology. Last HD , -2L. Phos elevated this AM.    - Acute pancreatitis on admission with elevated lipase, amylase. Last labs drawn .    - Micronutrient supplementation: multivitamin, thiamine     GI:  Last BM 12/29 x 1, 12/28 x 2.   Bowel Regimen? [x] Yes   [] No   - Antibiotic regimen noted    Weights:   Daily Weight in k.2 (12-29), Weight in k.3 (12-28), Weight in k.4 (12-27), Weight in k.2 (12-26), Weight in k.9 (12-25), Weight in k.3 (12-24), Weight in k.3 (12-23)   - weight fluctuations noted, pt receiving iHD likely resulting in fluid shifts - will continue to monitor    MEDICATIONS  (STANDING):  aMIOdarone    Tablet 200 milliGRAM(s) Oral daily  aspirin  chewable 81 milliGRAM(s) Oral daily  atorvastatin 80 milliGRAM(s) Oral at bedtime  caspofungin IVPB 50 milliGRAM(s) IV Intermittent every 24 hours  caspofungin IVPB      chlorhexidine 0.12% Liquid 15 milliLiter(s) Oral Mucosa every 12 hours  chlorhexidine 2% Cloths 1 Application(s) Topical <User Schedule>  dextrose 5%. 1000 milliLiter(s) (50 mL/Hr) IV Continuous <Continuous>  dextrose 5%. 1000 milliLiter(s) (100 mL/Hr) IV Continuous <Continuous>  dextrose 50% Injectable 50 milliLiter(s) IV Push every 15 minutes  glucagon  Injectable 1 milliGRAM(s) IntraMuscular once  heparin   Injectable 5000 Unit(s) SubCutaneous every 8 hours  insulin lispro (ADMELOG) corrective regimen sliding scale   SubCutaneous every 6 hours  insulin NPH human recombinant 5 Unit(s) SubCutaneous every 6 hours  insulin regular Infusion 2 Unit(s)/Hr (2 mL/Hr) IV Continuous <Continuous>  meropenem  IVPB 500 milliGRAM(s) IV Intermittent <User Schedule>  multivitamin/minerals/iron Oral Solution (CENTRUM) 15 milliLiter(s) Oral daily  pantoprazole  Injectable 40 milliGRAM(s) IV Push daily  senna 2 Tablet(s) Oral at bedtime  thiamine 100 milliGRAM(s) Enteral Tube daily    MEDICATIONS  (PRN):  acetaminophen    Suspension .. 650 milliGRAM(s) Oral every 6 hours PRN Temp greater or equal to 38.5C (101.3F)  dextrose Oral Gel 15 Gram(s) Oral once PRN Blood Glucose LESS THAN 70 milliGRAM(s)/deciliter  sodium chloride 0.9% lock flush 10 milliLiter(s) IV Push every 1 hour PRN Pre/post blood products, medications, blood draw, and to maintain line patency    Pertinent Labs:  @ 00:35: Na 137, BUN 84<H>, Cr 4.64<H>, <H>, K+ 4.5, Phos 5.8<H>, Mg 2.6, Alk Phos 88, ALT/SGPT 21, AST/SGOT 30, HbA1c --    A1C with Estimated Average Glucose Result: 7.3 % (22 @ 03:58)    Finger Sticks:  POCT Blood Glucose.: 149 mg/dL ( @ 08:09)  POCT Blood Glucose.: 148 mg/dL ( @ 06:51)  POCT Blood Glucose.: 141 mg/dL ( @ 05:59)  POCT Blood Glucose.: 150 mg/dL ( @ 05:02)  POCT Blood Glucose.: 133 mg/dL ( @ 04:10)  POCT Blood Glucose.: 131 mg/dL ( @ 03:11)  POCT Blood Glucose.: 140 mg/dL ( @ 02:00)  POCT Blood Glucose.: 130 mg/dL ( @ 01:08)  POCT Blood Glucose.: 132 mg/dL ( @ 00:01)  POCT Blood Glucose.: 131 mg/dL ( @ 23:04)  POCT Blood Glucose.: 130 mg/dL ( @ 22:06)  POCT Blood Glucose.: 150 mg/dL ( @ 21:02)  POCT Blood Glucose.: 156 mg/dL ( @ 20:05)  POCT Blood Glucose.: 131 mg/dL ( @ 18:58)  POCT Blood Glucose.: 108 mg/dL ( @ 17:49)  POCT Blood Glucose.: 106 mg/dL ( @ 17:10)  POCT Blood Glucose.: 98 mg/dL ( @ 15:52)  POCT Blood Glucose.: 112 mg/dL ( @ 14:55)  POCT Blood Glucose.: 121 mg/dL ( @ 14:01)  POCT Blood Glucose.: 141 mg/dL (12-28 @ 13:02)  POCT Blood Glucose.: 126 mg/dL ( @ 11:46)  POCT Blood Glucose.: 132 mg/dL ( @ 10:54)    Skin per nursing documentation: No noted pressure injuries as per documentation.   Edema: 1+ generalized,  2+ left foot; right foot    Estimated Energy Needs: 2000-2442kcal (23-28kcal/kg)  Estimated Protein Needs: 104-122g protein (1.2-1.4g/kg)  Defer fluids to team.   Needs based on lowest daily wt 87.2kg () for energy & protein    Previous Nutrition Diagnosis: Severe acute malnutrition & Increased nutrient needs  Nutrition Diagnosis is: [x] ongoing  [] resolved [] not applicable     New Nutrition Diagnosis: [x] Not applicable    Nutrition Care Plan:  [x] In Progress - being addressed with EN   [] Achieved  [] Not applicable    Nutrition Interventions:     Education Provided:       [] Yes:  [x] No: N/A at this time.    Recommendations:  1. Recommend increase enteral feeds - Glucerna 1.2 with NEW goal rate 70mL/hr x 24hr + No Carb Prosource TF x 2 to provide 1680mL total volume, 2096kcal, 123g protein, 1352mL free water. Regimen in full meets 24kcal/kg, 1.4g/kg protein based on lowest daily weight 87.2kg.   -- Free water flushes per team.  -- Monitor need for renal formula.  2. Recommend change multivitamin to Nephro-Juvencio, continue thiamine supplementation.  3. Monitor GI tolerance to feeds, RD remains available to adjust TF regimen/formulary as needed/upon request.     Monitoring and Evaluation:   Continue to monitor nutritional intake, tolerance to diet prescription, weights, labs, skin integrity    RD remains available upon request and will follow up per protocol    Mala Han MS, RD, CDN, [facility] Pager #[phone]

## 2022-12-29 NOTE — PROGRESS NOTE ADULT - SUBJECTIVE AND OBJECTIVE BOX
[facility] DIVISION OF KIDNEY DISEASES AND HYPERTENSION -- FOLLOW UP NOTE  --------------------------------------------------------------------------------  Chief Complaint:Acute pancreatitis without infection or necrosis    HPI:  61 y/o male with PMH of CABG, DM, HTN, HLD presenting as transfer from [place] for c/f STEMI. Course c/b gallstone pancreatitis leading to ARDS and shock & cardiac arrest now on [facility]. Had significant troponin elevation and his LVEF down to 8%. Pt was deemed to be too unstable to have an angiogram and potential PCI due to his co-morbidities & a new subdural bleed. Pt being seen for ERIC. SCr on arrival was 2.15; trended down to hector 1.6 on 11/25 and eventually increased to 3.95 11/28. Pt received IV diuretics as per CTU. Pt initiated on CRRT on 12/5/22 to optimize volume status and electrolytes.  Pt underwent decannulation of ECMO on 12/8/22. Pt was restarted on 12/9/22 for volume overload. Pt underwent mitral clip OR (12/16/22).  s/p trach on 12/16     HPI: Pt. seen this AM. No acute events overnight. Tolerated HD yesterday.     PAST HISTORY  --------------------------------------------------------------------------------  No significant changes to PMH, PSH, FHx, SHx, unless otherwise noted    ALLERGIES & MEDICATIONS  --------------------------------------------------------------------------------  Allergies    No Known Allergies    Intolerances      Standing Inpatient Medications  aMIOdarone    Tablet 200 milliGRAM(s) Oral daily  aspirin  chewable 81 milliGRAM(s) Oral daily  atorvastatin 80 milliGRAM(s) Oral at bedtime  caspofungin IVPB 50 milliGRAM(s) IV Intermittent every 24 hours  caspofungin IVPB      chlorhexidine 0.12% Liquid 15 milliLiter(s) Oral Mucosa every 12 hours  chlorhexidine 2% Cloths 1 Application(s) Topical <User Schedule>  dextrose 50% Injectable 50 milliLiter(s) IV Push every 15 minutes  heparin   Injectable 5000 Unit(s) SubCutaneous every 8 hours  insulin regular Infusion 2 Unit(s)/Hr IV Continuous <Continuous>  meropenem  IVPB 500 milliGRAM(s) IV Intermittent <User Schedule>  multivitamin/minerals/iron Oral Solution (CENTRUM) 15 milliLiter(s) Oral daily  pantoprazole  Injectable 40 milliGRAM(s) IV Push daily  senna 2 Tablet(s) Oral at bedtime  thiamine 100 milliGRAM(s) Enteral Tube daily    PRN Inpatient Medications  acetaminophen    Suspension .. 650 milliGRAM(s) Oral every 6 hours PRN  sodium chloride 0.9% lock flush 10 milliLiter(s) IV Push every 1 hour PRN      REVIEW OF SYSTEMS  --------------------------------------------------------------------------------  Unable to obtain    VITALS/PHYSICAL EXAM  --------------------------------------------------------------------------------  T(C): 36.7 (12-29-22 @ 04:00), Max: 37.1 (12-28-22 @ 20:00)  HR: 98 (12-29-22 @ 08:16) (98 - 107)  BP: --  RR: 20 (12-29-22 @ 08:15) (14 - 35)  SpO2: 100% (12-29-22 @ 08:16) (95% - 100%)  Wt(kg): --        12-28-22 @ 07:01  -  12-29-22 @ 07:00  --------------------------------------------------------  IN: 2649.5 mL / OUT: 3130 mL / NET: -480.5 mL      Physical Exam:  	Gen: ill appearing male  	HEENT: Anicteric  	Pulm:  trach+  	CV: S1S2+  	Abd: Soft, +BS       	Ext: trace LE edema B/L  	Neuro: Awake  	Skin: Warm and dry  	Vascular access: L. Femoral catheter      LABS/STUDIES  --------------------------------------------------------------------------------              8.4    10.33 >-----------<  170      [12-29-22 @ 00:35]              26.8     137  |  96  |  84  ----------------------------<  124      [12-29-22 @ 00:35]  4.5   |  25  |  4.64        Ca     8.0     [12-29-22 @ 00:35]      Mg     2.6     [12-29-22 @ 00:35]      Phos  5.8     [12-29-22 @ 00:35]    TPro  6.9  /  Alb  2.9  /  TBili  0.8  /  DBili  x   /  AST  30  /  ALT  21  /  AlkPhos  88  [12-29-22 @ 00:35]    PT/INR: PT 12.2 , INR 1.06       [12-29-22 @ 00:35]  PTT: 31.2       [12-29-22 @ 00:35]      Creatinine Trend:  SCr 4.64 [12-29 @ 00:35]  SCr 5.50 [12-28 @ 00:40]  SCr 4.64 [12-27 @ 00:28]  SCr 5.35 [12-26 @ 00:25]  SCr 5.19 [12-25 @ 15:24]    Urinalysis - [12-26-22 @ 13:19]      Color Yellow / Appearance Clear / SG 1.013 / pH 6.0      Gluc Negative / Ketone Negative  / Bili Negative / Urobili Negative       Blood Large / Protein 100 mg/dL / Leuk Est Moderate / Nitrite Negative      RBC >50 / WBC 35 / Hyaline 1 / Gran  / Sq Epi  / Non Sq Epi 2 / Bacteria Few      TSH 2.84      [12-10-22 @ 15:55]  Lipid: chol --, , HDL --, LDL --      [12-05-22 @ 00:50]    HBsAb 889.7      [12-20-22 @ 11:25]  HBcAb Reactive      [12-20-22 @ 11:25]  HCV 0.15, Nonreact      [12-20-22 @ 11:25]     [facility] DIVISION OF KIDNEY DISEASES AND HYPERTENSION -- FOLLOW UP NOTE  --------------------------------------------------------------------------------  Chief Complaint:Acute pancreatitis without infection or necrosis    HPI:  61 y/o male with PMH of CABG, DM, HTN, HLD presenting as transfer from [place] for c/f STEMI. Course c/b gallstone pancreatitis leading to ARDS and shock & cardiac arrest now on [facility]. Had significant troponin elevation and his LVEF down to 8%. Pt was deemed to be too unstable to have an angiogram and potential PCI due to his co-morbidities & a new subdural bleed. Pt being seen for ERIC. SCr on arrival was 2.15; trended down to hector 1.6 on 11/25 and eventually increased to 3.95 11/28. Pt received IV diuretics as per CTU. Pt initiated on CRRT on 12/5/22 to optimize volume status and electrolytes.  Pt underwent decannulation of ECMO on 12/8/22. Pt was restarted on 12/9/22 for volume overload. Pt underwent mitral clip OR (12/16/22).  s/p trach on 12/16     HPI: Pt. seen this AM. No acute events overnight. Tolerated HD yesterday.     PAST HISTORY  --------------------------------------------------------------------------------  No significant changes to PMH, PSH, FHx, SHx, unless otherwise noted    ALLERGIES & MEDICATIONS  --------------------------------------------------------------------------------  Allergies    No Known Allergies    Intolerances      Standing Inpatient Medications  aMIOdarone    Tablet 200 milliGRAM(s) Oral daily  aspirin  chewable 81 milliGRAM(s) Oral daily  atorvastatin 80 milliGRAM(s) Oral at bedtime  caspofungin IVPB 50 milliGRAM(s) IV Intermittent every 24 hours  caspofungin IVPB      chlorhexidine 0.12% Liquid 15 milliLiter(s) Oral Mucosa every 12 hours  chlorhexidine 2% Cloths 1 Application(s) Topical <User Schedule>  dextrose 50% Injectable 50 milliLiter(s) IV Push every 15 minutes  heparin   Injectable 5000 Unit(s) SubCutaneous every 8 hours  insulin regular Infusion 2 Unit(s)/Hr IV Continuous <Continuous>  meropenem  IVPB 500 milliGRAM(s) IV Intermittent <User Schedule>  multivitamin/minerals/iron Oral Solution (CENTRUM) 15 milliLiter(s) Oral daily  pantoprazole  Injectable 40 milliGRAM(s) IV Push daily  senna 2 Tablet(s) Oral at bedtime  thiamine 100 milliGRAM(s) Enteral Tube daily    PRN Inpatient Medications  acetaminophen    Suspension .. 650 milliGRAM(s) Oral every 6 hours PRN  sodium chloride 0.9% lock flush 10 milliLiter(s) IV Push every 1 hour PRN      REVIEW OF SYSTEMS  --------------------------------------------------------------------------------  Unable to obtain    VITALS/PHYSICAL EXAM  --------------------------------------------------------------------------------  T(C): 36.7 (12-29-22 @ 04:00), Max: 37.1 (12-28-22 @ 20:00)  HR: 98 (12-29-22 @ 08:16) (98 - 107)  BP: --  RR: 20 (12-29-22 @ 08:15) (14 - 35)  SpO2: 100% (12-29-22 @ 08:16) (95% - 100%)  Wt(kg): --        12-28-22 @ 07:01  -  12-29-22 @ 07:00  --------------------------------------------------------  IN: 2649.5 mL / OUT: 3130 mL / NET: -480.5 mL      Physical Exam:  	Gen: ill appearing male  	HEENT: Anicteric  	Pulm:  trach+  	CV: S1S2+  	Abd: Soft, +BS       	Ext: trace LE edema B/L  	Neuro: Awake  	Skin: Warm and dry  	Vascular access: L. Femoral catheter      LABS/STUDIES  --------------------------------------------------------------------------------              8.4    10.33 >-----------<  170      [12-29-22 @ 00:35]              26.8     137  |  96  |  84  ----------------------------<  124      [12-29-22 @ 00:35]  4.5   |  25  |  4.64        Ca     8.0     [12-29-22 @ 00:35]      Mg     2.6     [12-29-22 @ 00:35]      Phos  5.8     [12-29-22 @ 00:35]    TPro  6.9  /  Alb  2.9  /  TBili  0.8  /  DBili  x   /  AST  30  /  ALT  21  /  AlkPhos  88  [12-29-22 @ 00:35]    PT/INR: PT 12.2 , INR 1.06       [12-29-22 @ 00:35]  PTT: 31.2       [12-29-22 @ 00:35]      Creatinine Trend:  SCr 4.64 [12-29 @ 00:35]  SCr 5.50 [12-28 @ 00:40]  SCr 4.64 [12-27 @ 00:28]  SCr 5.35 [12-26 @ 00:25]  SCr 5.19 [12-25 @ 15:24]    Urinalysis - [12-26-22 @ 13:19]      Color Yellow / Appearance Clear / SG 1.013 / pH 6.0      Gluc Negative / Ketone Negative  / Bili Negative / Urobili Negative       Blood Large / Protein 100 mg/dL / Leuk Est Moderate / Nitrite Negative      RBC >50 / WBC 35 / Hyaline 1 / Gran  / Sq Epi  / Non Sq Epi 2 / Bacteria Few      TSH 2.84      [12-10-22 @ 15:55]  Lipid: chol --, , HDL --, LDL --      [12-05-22 @ 00:50]    HBsAb 889.7      [12-20-22 @ 11:25]  HBcAb Reactive      [12-20-22 @ 11:25]  HCV 0.15, Nonreact      [12-20-22 @ 11:25]     [facility] DIVISION OF KIDNEY DISEASES AND HYPERTENSION -- FOLLOW UP NOTE  --------------------------------------------------------------------------------  Chief Complaint:Acute pancreatitis without infection or necrosis    HPI:  61 y/o male with PMH of CABG, DM, HTN, HLD presenting as transfer from [place] for c/f STEMI. Course c/b gallstone pancreatitis leading to ARDS and shock & cardiac arrest now on [facility]. Had significant troponin elevation and his LVEF down to 8%. Pt was deemed to be too unstable to have an angiogram and potential PCI due to his co-morbidities & a new subdural bleed. Pt being seen for ERIC. SCr on arrival was 2.15; trended down to hector 1.6 on 11/25 and eventually increased to 3.95 11/28. Pt received IV diuretics as per CTU. Pt initiated on CRRT on 12/5/22 to optimize volume status and electrolytes.  Pt underwent decannulation of ECMO on 12/8/22. Pt was restarted on 12/9/22 for volume overload. Pt underwent mitral clip OR (12/16/22).  s/p trach on 12/16     HPI: Pt. seen this AM. No acute events overnight. Tolerated HD yesterday.     PAST HISTORY  --------------------------------------------------------------------------------  No significant changes to PMH, PSH, FHx, SHx, unless otherwise noted    ALLERGIES & MEDICATIONS  --------------------------------------------------------------------------------  Allergies    No Known Allergies    Intolerances      Standing Inpatient Medications  aMIOdarone    Tablet 200 milliGRAM(s) Oral daily  aspirin  chewable 81 milliGRAM(s) Oral daily  atorvastatin 80 milliGRAM(s) Oral at bedtime  caspofungin IVPB 50 milliGRAM(s) IV Intermittent every 24 hours  caspofungin IVPB      chlorhexidine 0.12% Liquid 15 milliLiter(s) Oral Mucosa every 12 hours  chlorhexidine 2% Cloths 1 Application(s) Topical <User Schedule>  dextrose 50% Injectable 50 milliLiter(s) IV Push every 15 minutes  heparin   Injectable 5000 Unit(s) SubCutaneous every 8 hours  insulin regular Infusion 2 Unit(s)/Hr IV Continuous <Continuous>  meropenem  IVPB 500 milliGRAM(s) IV Intermittent <User Schedule>  multivitamin/minerals/iron Oral Solution (CENTRUM) 15 milliLiter(s) Oral daily  pantoprazole  Injectable 40 milliGRAM(s) IV Push daily  senna 2 Tablet(s) Oral at bedtime  thiamine 100 milliGRAM(s) Enteral Tube daily    PRN Inpatient Medications  acetaminophen    Suspension .. 650 milliGRAM(s) Oral every 6 hours PRN  sodium chloride 0.9% lock flush 10 milliLiter(s) IV Push every 1 hour PRN      REVIEW OF SYSTEMS  --------------------------------------------------------------------------------  Unable to obtain    VITALS/PHYSICAL EXAM  --------------------------------------------------------------------------------  T(C): 36.7 (12-29-22 @ 04:00), Max: 37.1 (12-28-22 @ 20:00)  HR: 98 (12-29-22 @ 08:16) (98 - 107)  BP: --  RR: 20 (12-29-22 @ 08:15) (14 - 35)  SpO2: 100% (12-29-22 @ 08:16) (95% - 100%)  Wt(kg): --        12-28-22 @ 07:01  -  12-29-22 @ 07:00  --------------------------------------------------------  IN: 2649.5 mL / OUT: 3130 mL / NET: -480.5 mL      Physical Exam:  	Gen: ill appearing male  	HEENT: Anicteric  	Pulm:  trach+  	CV: S1S2+  	Abd: Soft, +BS       	Ext: trace LE edema B/L  	Neuro: Awake  	Skin: Warm and dry  	Vascular access: L. Femoral catheter      LABS/STUDIES  --------------------------------------------------------------------------------              8.4    10.33 >-----------<  170      [12-29-22 @ 00:35]              26.8     137  |  96  |  84  ----------------------------<  124      [12-29-22 @ 00:35]  4.5   |  25  |  4.64        Ca     8.0     [12-29-22 @ 00:35]      Mg     2.6     [12-29-22 @ 00:35]      Phos  5.8     [12-29-22 @ 00:35]    TPro  6.9  /  Alb  2.9  /  TBili  0.8  /  DBili  x   /  AST  30  /  ALT  21  /  AlkPhos  88  [12-29-22 @ 00:35]    PT/INR: PT 12.2 , INR 1.06       [12-29-22 @ 00:35]  PTT: 31.2       [12-29-22 @ 00:35]      Creatinine Trend:  SCr 4.64 [12-29 @ 00:35]  SCr 5.50 [12-28 @ 00:40]  SCr 4.64 [12-27 @ 00:28]  SCr 5.35 [12-26 @ 00:25]  SCr 5.19 [12-25 @ 15:24]    Urinalysis - [12-26-22 @ 13:19]      Color Yellow / Appearance Clear / SG 1.013 / pH 6.0      Gluc Negative / Ketone Negative  / Bili Negative / Urobili Negative       Blood Large / Protein 100 mg/dL / Leuk Est Moderate / Nitrite Negative      RBC >50 / WBC 35 / Hyaline 1 / Gran  / Sq Epi  / Non Sq Epi 2 / Bacteria Few      TSH 2.84      [12-10-22 @ 15:55]  Lipid: chol --, , HDL --, LDL --      [12-05-22 @ 00:50]    HBsAb 889.7      [12-20-22 @ 11:25]  HBcAb Reactive      [12-20-22 @ 11:25]  HCV 0.15, Nonreact      [12-20-22 @ 11:25]

## 2022-12-29 NOTE — PROGRESS NOTE ADULT - NS ATTEND AMEND GEN_ALL_CORE FT
Overall, clinically doing well considering how critically he has been during this hospitalization. He is OOB to chair, neurologically intact, and stable from a cardiac perspective with improved SBP into low 100s. Ongoing ERIC requiring iHD, but made 630 mL of urine.    Would try to initiate low dose HDZN for afterload reduction given severe ICM, EF 22% on today's echo. If he tolerates it, would try to increase by 10 mg each day. Would avoid ARNI, MRA, and SGLT2-i until he either has renal recovery or is committed to HD (ARNI). Unfortunately, due to his marginal BPs, there is not much else to offer at this time. Hopefully, as he regains strength, his BP may improve and we can try additional medications.    If not, would consider heart/kidney evaluation if he is otherwise a reasonable candidate from a medical and psychosocial perspective. HF team to check in periodically, but please call if any questions or concerns in the interim.

## 2022-12-30 LAB
ALBUMIN SERPL ELPH-MCNC: 3.1 G/DL — LOW (ref 3.3–5)
ALP SERPL-CCNC: 82 U/L — SIGNIFICANT CHANGE UP (ref 40–120)
ALT FLD-CCNC: 19 U/L — SIGNIFICANT CHANGE UP (ref 10–45)
AMYLASE P1 CFR SERPL: 313 U/L — HIGH (ref 25–125)
ANION GAP SERPL CALC-SCNC: 16 MMOL/L — SIGNIFICANT CHANGE UP (ref 5–17)
AST SERPL-CCNC: 32 U/L — SIGNIFICANT CHANGE UP (ref 10–40)
BILIRUB SERPL-MCNC: 0.8 MG/DL — SIGNIFICANT CHANGE UP (ref 0.2–1.2)
BUN SERPL-MCNC: 100 MG/DL — HIGH (ref 7–23)
CALCIUM SERPL-MCNC: 8.8 MG/DL — SIGNIFICANT CHANGE UP (ref 8.4–10.5)
CHLORIDE SERPL-SCNC: 96 MMOL/L — SIGNIFICANT CHANGE UP (ref 96–108)
CO2 SERPL-SCNC: 25 MMOL/L — SIGNIFICANT CHANGE UP (ref 22–31)
CREAT SERPL-MCNC: 5.36 MG/DL — HIGH (ref 0.5–1.3)
EGFR: 11 ML/MIN/1.73M2 — LOW
FERRITIN SERPL-MCNC: 355 NG/ML — SIGNIFICANT CHANGE UP (ref 30–400)
GAS PNL BLDA: SIGNIFICANT CHANGE UP
GLUCOSE BLDC GLUCOMTR-MCNC: 140 MG/DL — HIGH (ref 70–99)
GLUCOSE BLDC GLUCOMTR-MCNC: 155 MG/DL — HIGH (ref 70–99)
GLUCOSE BLDC GLUCOMTR-MCNC: 160 MG/DL — HIGH (ref 70–99)
GLUCOSE BLDC GLUCOMTR-MCNC: 173 MG/DL — HIGH (ref 70–99)
GLUCOSE BLDC GLUCOMTR-MCNC: 186 MG/DL — HIGH (ref 70–99)
GLUCOSE BLDC GLUCOMTR-MCNC: 196 MG/DL — HIGH (ref 70–99)
GLUCOSE BLDC GLUCOMTR-MCNC: 204 MG/DL — HIGH (ref 70–99)
GLUCOSE BLDC GLUCOMTR-MCNC: 227 MG/DL — HIGH (ref 70–99)
GLUCOSE SERPL-MCNC: 201 MG/DL — HIGH (ref 70–99)
HCT VFR BLD CALC: 26.5 % — LOW (ref 39–50)
HGB BLD-MCNC: 8.2 G/DL — LOW (ref 13–17)
IRON SATN MFR SERPL: 22 % — SIGNIFICANT CHANGE UP (ref 16–55)
IRON SATN MFR SERPL: 50 UG/DL — SIGNIFICANT CHANGE UP (ref 45–165)
LIDOCAIN IGE QN: 926 U/L — HIGH (ref 7–60)
MAGNESIUM SERPL-MCNC: 2.7 MG/DL — HIGH (ref 1.6–2.6)
MCHC RBC-ENTMCNC: 29.5 PG — SIGNIFICANT CHANGE UP (ref 27–34)
MCHC RBC-ENTMCNC: 30.9 GM/DL — LOW (ref 32–36)
MCV RBC AUTO: 95.3 FL — SIGNIFICANT CHANGE UP (ref 80–100)
NRBC # BLD: 0 /100 WBCS — SIGNIFICANT CHANGE UP (ref 0–0)
PHOSPHATE SERPL-MCNC: 7.3 MG/DL — HIGH (ref 2.5–4.5)
PLATELET # BLD AUTO: 148 K/UL — LOW (ref 150–400)
POTASSIUM SERPL-MCNC: 4.2 MMOL/L — SIGNIFICANT CHANGE UP (ref 3.5–5.3)
POTASSIUM SERPL-SCNC: 4.2 MMOL/L — SIGNIFICANT CHANGE UP (ref 3.5–5.3)
PROT SERPL-MCNC: 6.7 G/DL — SIGNIFICANT CHANGE UP (ref 6–8.3)
RBC # BLD: 2.78 M/UL — LOW (ref 4.2–5.8)
RBC # FLD: 18.2 % — HIGH (ref 10.3–14.5)
SODIUM SERPL-SCNC: 137 MMOL/L — SIGNIFICANT CHANGE UP (ref 135–145)
TIBC SERPL-MCNC: 228 UG/DL — SIGNIFICANT CHANGE UP (ref 220–430)
UIBC SERPL-MCNC: 179 UG/DL — SIGNIFICANT CHANGE UP (ref 110–370)
WBC # BLD: 9.58 K/UL — SIGNIFICANT CHANGE UP (ref 3.8–10.5)
WBC # FLD AUTO: 9.58 K/UL — SIGNIFICANT CHANGE UP (ref 3.8–10.5)

## 2022-12-30 PROCEDURE — 74018 RADEX ABDOMEN 1 VIEW: CPT | Mod: 26

## 2022-12-30 PROCEDURE — 99232 SBSQ HOSP IP/OBS MODERATE 35: CPT

## 2022-12-30 PROCEDURE — 99291 CRITICAL CARE FIRST HOUR: CPT

## 2022-12-30 PROCEDURE — 99233 SBSQ HOSP IP/OBS HIGH 50: CPT | Mod: GC

## 2022-12-30 PROCEDURE — 99223 1ST HOSP IP/OBS HIGH 75: CPT | Mod: GC

## 2022-12-30 PROCEDURE — 71045 X-RAY EXAM CHEST 1 VIEW: CPT | Mod: 26

## 2022-12-30 RX ORDER — HUMAN INSULIN 100 [IU]/ML
7 INJECTION, SUSPENSION SUBCUTANEOUS EVERY 6 HOURS
Refills: 0 | Status: DISCONTINUED | OUTPATIENT
Start: 2022-12-30 | End: 2023-01-14

## 2022-12-30 RX ORDER — INSULIN LISPRO 100/ML
VIAL (ML) SUBCUTANEOUS EVERY 4 HOURS
Refills: 0 | Status: DISCONTINUED | OUTPATIENT
Start: 2022-12-30 | End: 2023-01-07

## 2022-12-30 RX ORDER — INSULIN LISPRO 100/ML
VIAL (ML) SUBCUTANEOUS
Refills: 0 | Status: DISCONTINUED | OUTPATIENT
Start: 2022-12-30 | End: 2022-12-30

## 2022-12-30 RX ORDER — INSULIN LISPRO 100/ML
VIAL (ML) SUBCUTANEOUS EVERY 4 HOURS
Refills: 0 | Status: DISCONTINUED | OUTPATIENT
Start: 2022-12-30 | End: 2022-12-30

## 2022-12-30 RX ORDER — SEVELAMER CARBONATE 2400 MG/1
800 POWDER, FOR SUSPENSION ORAL
Refills: 0 | Status: DISCONTINUED | OUTPATIENT
Start: 2022-12-30 | End: 2022-12-30

## 2022-12-30 RX ORDER — SEVELAMER CARBONATE 2400 MG/1
800 POWDER, FOR SUSPENSION ORAL
Refills: 0 | Status: DISCONTINUED | OUTPATIENT
Start: 2022-12-30 | End: 2023-01-06

## 2022-12-30 RX ORDER — INSULIN HUMAN 100 [IU]/ML
3 INJECTION, SOLUTION SUBCUTANEOUS
Qty: 100 | Refills: 0 | Status: DISCONTINUED | OUTPATIENT
Start: 2022-12-30 | End: 2022-12-30

## 2022-12-30 RX ADMIN — PANTOPRAZOLE SODIUM 40 MILLIGRAM(S): 20 TABLET, DELAYED RELEASE ORAL at 12:47

## 2022-12-30 RX ADMIN — SEVELAMER CARBONATE 800 MILLIGRAM(S): 2400 POWDER, FOR SUSPENSION ORAL at 05:22

## 2022-12-30 RX ADMIN — Medication 100 MILLIGRAM(S): at 12:47

## 2022-12-30 RX ADMIN — HEPARIN SODIUM 5000 UNIT(S): 5000 INJECTION INTRAVENOUS; SUBCUTANEOUS at 17:31

## 2022-12-30 RX ADMIN — HUMAN INSULIN 7 UNIT(S): 100 INJECTION, SUSPENSION SUBCUTANEOUS at 12:46

## 2022-12-30 RX ADMIN — HUMAN INSULIN 5 UNIT(S): 100 INJECTION, SUSPENSION SUBCUTANEOUS at 05:22

## 2022-12-30 RX ADMIN — HUMAN INSULIN 7 UNIT(S): 100 INJECTION, SUSPENSION SUBCUTANEOUS at 17:44

## 2022-12-30 RX ADMIN — SEVELAMER CARBONATE 800 MILLIGRAM(S): 2400 POWDER, FOR SUSPENSION ORAL at 17:31

## 2022-12-30 RX ADMIN — Medication 3: at 17:43

## 2022-12-30 RX ADMIN — CHLORHEXIDINE GLUCONATE 1 APPLICATION(S): 213 SOLUTION TOPICAL at 05:21

## 2022-12-30 RX ADMIN — INSULIN HUMAN 3 UNIT(S)/HR: 100 INJECTION, SOLUTION SUBCUTANEOUS at 07:46

## 2022-12-30 RX ADMIN — CASPOFUNGIN ACETATE 260 MILLIGRAM(S): 7 INJECTION, POWDER, LYOPHILIZED, FOR SOLUTION INTRAVENOUS at 18:36

## 2022-12-30 RX ADMIN — Medication 3: at 11:06

## 2022-12-30 RX ADMIN — MEROPENEM 100 MILLIGRAM(S): 1 INJECTION INTRAVENOUS at 21:05

## 2022-12-30 RX ADMIN — Medication 15 MILLILITER(S): at 12:47

## 2022-12-30 RX ADMIN — CHLORHEXIDINE GLUCONATE 15 MILLILITER(S): 213 SOLUTION TOPICAL at 17:31

## 2022-12-30 RX ADMIN — HUMAN INSULIN 7 UNIT(S): 100 INJECTION, SUSPENSION SUBCUTANEOUS at 23:04

## 2022-12-30 RX ADMIN — CHLORHEXIDINE GLUCONATE 15 MILLILITER(S): 213 SOLUTION TOPICAL at 05:21

## 2022-12-30 RX ADMIN — HUMAN INSULIN 5 UNIT(S): 100 INJECTION, SUSPENSION SUBCUTANEOUS at 00:28

## 2022-12-30 RX ADMIN — ATORVASTATIN CALCIUM 80 MILLIGRAM(S): 80 TABLET, FILM COATED ORAL at 21:05

## 2022-12-30 RX ADMIN — Medication 3: at 13:15

## 2022-12-30 RX ADMIN — Medication 81 MILLIGRAM(S): at 12:47

## 2022-12-30 RX ADMIN — Medication 3: at 23:04

## 2022-12-30 RX ADMIN — HEPARIN SODIUM 5000 UNIT(S): 5000 INJECTION INTRAVENOUS; SUBCUTANEOUS at 00:32

## 2022-12-30 RX ADMIN — AMIODARONE HYDROCHLORIDE 200 MILLIGRAM(S): 400 TABLET ORAL at 05:21

## 2022-12-30 NOTE — PROGRESS NOTE ADULT - SUBJECTIVE AND OBJECTIVE BOX
[facility] DIVISION OF KIDNEY DISEASES AND HYPERTENSION -- FOLLOW UP NOTE  --------------------------------------------------------------------------------  Chief Complaint:Acute pancreatitis without infection or necrosis    HPI:  63 y/o male with PMH of CABG, DM, HTN, HLD presenting as transfer from [place] for c/f STEMI. Course c/b gallstone pancreatitis leading to ARDS and shock & cardiac arrest now on [facility] ECMO. Had significant troponin elevation and his LVEF down to 8%. Pt was deemed to be too unstable to have an angiogram and potential PCI due to his co-morbidities & a new subdural bleed. Pt being seen for ERIC. SCr on arrival was 2.15; trended down to hector 1.6 on 11/25 and eventually increased to 3.95 11/28. Pt received IV diuretics as per CTU. Pt initiated on CRRT on 12/5/22 to optimize volume status and electrolytes.  Pt underwent decannulation of ECMO on 12/8/22. Pt was restarted on 12/9/22 for volume overload. Pt underwent mitral clip OR (12/16/22). s/p trach on 12/16     HPI: Pt. seen this AM. Tolerated UF session overnight. Breathing is improved. BP was low so UF terminated early. For HD today. with 2L as tolerated. Complaining of abdominal pain LLQ for me.         PAST HISTORY  --------------------------------------------------------------------------------  No significant changes to PMH, PSH, FHx, SHx, unless otherwise noted    ALLERGIES & MEDICATIONS  --------------------------------------------------------------------------------  Allergies    No Known Allergies    Intolerances      Standing Inpatient Medications  aMIOdarone    Tablet 200 milliGRAM(s) Oral daily  aspirin  chewable 81 milliGRAM(s) Oral daily  atorvastatin 80 milliGRAM(s) Oral at bedtime  caspofungin IVPB 50 milliGRAM(s) IV Intermittent every 24 hours  caspofungin IVPB      chlorhexidine 0.12% Liquid 15 milliLiter(s) Oral Mucosa every 12 hours  chlorhexidine 2% Cloths 1 Application(s) Topical <User Schedule>  dextrose 5%. 1000 milliLiter(s) IV Continuous <Continuous>  dextrose 5%. 1000 milliLiter(s) IV Continuous <Continuous>  dextrose 50% Injectable 50 milliLiter(s) IV Push every 15 minutes  glucagon  Injectable 1 milliGRAM(s) IntraMuscular once  heparin   Injectable 5000 Unit(s) SubCutaneous every 8 hours  insulin lispro (ADMELOG) corrective regimen sliding scale   SubCutaneous every 4 hours  insulin NPH human recombinant 7 Unit(s) SubCutaneous every 6 hours  insulin regular Infusion 3 Unit(s)/Hr IV Continuous <Continuous>  meropenem  IVPB 500 milliGRAM(s) IV Intermittent <User Schedule>  multivitamin/minerals/iron Oral Solution (CENTRUM) 15 milliLiter(s) Oral daily  pantoprazole  Injectable 40 milliGRAM(s) IV Push daily  senna 2 Tablet(s) Oral at bedtime  sevelamer carbonate Powder 800 milliGRAM(s) Enteral Tube two times a day  thiamine 100 milliGRAM(s) Enteral Tube daily    PRN Inpatient Medications  acetaminophen    Suspension .. 650 milliGRAM(s) Oral every 6 hours PRN  dextrose Oral Gel 15 Gram(s) Oral once PRN  sodium chloride 0.9% lock flush 10 milliLiter(s) IV Push every 1 hour PRN      REVIEW OF SYSTEMS  --------------------------------------------------------------------------------  As per HPI    VITALS/PHYSICAL EXAM  --------------------------------------------------------------------------------  T(C): 37.3 (12-30-22 @ 04:00), Max: 37.5 (12-29-22 @ 20:00)  HR: 98 (12-30-22 @ 08:27) (91 - 102)  BP: --  RR: 18 (12-30-22 @ 08:26) (5 - 28)  SpO2: 97% (12-30-22 @ 08:27) (96% - 100%)  Wt(kg): --        12-29-22 @ 07:01  -  12-30-22 @ 07:00  --------------------------------------------------------  IN: 1985 mL / OUT: 1815 mL / NET: 170 mL    12-30-22 @ 07:01  -  12-30-22 @ 08:55  --------------------------------------------------------  IN: 70 mL / OUT: 50 mL / NET: 20 mL      Physical Exam:  	Gen: ill appearing male  	HEENT: Anicteric  	Pulm:  trach+  	CV: S1S2+  	Abd: Soft, +BS, +abdominal pain LLQ      	Ext: trace LE edema B/L  	Neuro: Awake  	Skin: Warm and dry  	Vascular access: L. Femoral catheter      LABS/STUDIES  --------------------------------------------------------------------------------              8.2    9.58  >-----------<  148      [12-30-22 @ 00:36]              26.5     137  |  96  |  100  ----------------------------<  201      [12-30-22 @ 00:33]  4.2   |  25  |  5.36        Ca     8.8     [12-30-22 @ 00:33]      Mg     2.7     [12-30-22 @ 00:33]      Phos  7.3     [12-30-22 @ 00:33]    TPro  6.7  /  Alb  3.1  /  TBili  0.8  /  DBili  x   /  AST  32  /  ALT  19  /  AlkPhos  82  [12-30-22 @ 00:33]    PT/INR: PT 12.2 , INR 1.06       [12-29-22 @ 00:35]  PTT: 31.2       [12-29-22 @ 00:35]      Creatinine Trend:  SCr 5.36 [12-30 @ 00:33]  SCr 4.64 [12-29 @ 00:35]  SCr 5.50 [12-28 @ 00:40]  SCr 4.64 [12-27 @ 00:28]  SCr 5.35 [12-26 @ 00:25]    Urinalysis - [12-26-22 @ 13:19]      Color Yellow / Appearance Clear / SG 1.013 / pH 6.0      Gluc Negative / Ketone Negative  / Bili Negative / Urobili Negative       Blood Large / Protein 100 mg/dL / Leuk Est Moderate / Nitrite Negative      RBC >50 / WBC 35 / Hyaline 1 / Gran  / Sq Epi  / Non Sq Epi 2 / Bacteria Few      TSH 2.84      [12-10-22 @ 15:55]  Lipid: chol --, , HDL --, LDL --      [12-05-22 @ 00:50]    HBsAb 889.7      [12-20-22 @ 11:25]  HBcAb Reactive      [12-20-22 @ 11:25]  HCV 0.15, Nonreact      [12-20-22 @ 11:25]     [facility] DIVISION OF KIDNEY DISEASES AND HYPERTENSION -- FOLLOW UP NOTE  --------------------------------------------------------------------------------  Chief Complaint:Acute pancreatitis without infection or necrosis    HPI:  63 y/o male with PMH of CABG, DM, HTN, HLD presenting as transfer from [place] for c/f STEMI. Course c/b gallstone pancreatitis leading to ARDS and shock & cardiac arrest now on [facility] ECMO. Had significant troponin elevation and his LVEF down to 8%. Pt was deemed to be too unstable to have an angiogram and potential PCI due to his co-morbidities & a new subdural bleed. Pt being seen for ERIC. SCr on arrival was 2.15; trended down to hector 1.6 on 11/25 and eventually increased to 3.95 11/28. Pt received IV diuretics as per CTU. Pt initiated on CRRT on 12/5/22 to optimize volume status and electrolytes.  Pt underwent decannulation of ECMO on 12/8/22. Pt was restarted on 12/9/22 for volume overload. Pt underwent mitral clip OR (12/16/22). s/p trach on 12/16     HPI: Pt. seen this AM. Tolerated UF session overnight. Breathing is improved. BP was low so UF terminated early. For HD today. with 2L as tolerated. Complaining of abdominal pain LLQ for me.         PAST HISTORY  --------------------------------------------------------------------------------  No significant changes to PMH, PSH, FHx, SHx, unless otherwise noted    ALLERGIES & MEDICATIONS  --------------------------------------------------------------------------------  Allergies    No Known Allergies    Intolerances      Standing Inpatient Medications  aMIOdarone    Tablet 200 milliGRAM(s) Oral daily  aspirin  chewable 81 milliGRAM(s) Oral daily  atorvastatin 80 milliGRAM(s) Oral at bedtime  caspofungin IVPB 50 milliGRAM(s) IV Intermittent every 24 hours  caspofungin IVPB      chlorhexidine 0.12% Liquid 15 milliLiter(s) Oral Mucosa every 12 hours  chlorhexidine 2% Cloths 1 Application(s) Topical <User Schedule>  dextrose 5%. 1000 milliLiter(s) IV Continuous <Continuous>  dextrose 5%. 1000 milliLiter(s) IV Continuous <Continuous>  dextrose 50% Injectable 50 milliLiter(s) IV Push every 15 minutes  glucagon  Injectable 1 milliGRAM(s) IntraMuscular once  heparin   Injectable 5000 Unit(s) SubCutaneous every 8 hours  insulin lispro (ADMELOG) corrective regimen sliding scale   SubCutaneous every 4 hours  insulin NPH human recombinant 7 Unit(s) SubCutaneous every 6 hours  insulin regular Infusion 3 Unit(s)/Hr IV Continuous <Continuous>  meropenem  IVPB 500 milliGRAM(s) IV Intermittent <User Schedule>  multivitamin/minerals/iron Oral Solution (CENTRUM) 15 milliLiter(s) Oral daily  pantoprazole  Injectable 40 milliGRAM(s) IV Push daily  senna 2 Tablet(s) Oral at bedtime  sevelamer carbonate Powder 800 milliGRAM(s) Enteral Tube two times a day  thiamine 100 milliGRAM(s) Enteral Tube daily    PRN Inpatient Medications  acetaminophen    Suspension .. 650 milliGRAM(s) Oral every 6 hours PRN  dextrose Oral Gel 15 Gram(s) Oral once PRN  sodium chloride 0.9% lock flush 10 milliLiter(s) IV Push every 1 hour PRN      REVIEW OF SYSTEMS  --------------------------------------------------------------------------------  As per HPI    VITALS/PHYSICAL EXAM  --------------------------------------------------------------------------------  T(C): 37.3 (12-30-22 @ 04:00), Max: 37.5 (12-29-22 @ 20:00)  HR: 98 (12-30-22 @ 08:27) (91 - 102)  BP: --  RR: 18 (12-30-22 @ 08:26) (5 - 28)  SpO2: 97% (12-30-22 @ 08:27) (96% - 100%)  Wt(kg): --        12-29-22 @ 07:01  -  12-30-22 @ 07:00  --------------------------------------------------------  IN: 1985 mL / OUT: 1815 mL / NET: 170 mL    12-30-22 @ 07:01  -  12-30-22 @ 08:55  --------------------------------------------------------  IN: 70 mL / OUT: 50 mL / NET: 20 mL      Physical Exam:  	Gen: ill appearing male  	HEENT: Anicteric  	Pulm:  trach+  	CV: S1S2+  	Abd: Soft, +BS, +abdominal pain LLQ      	Ext: trace LE edema B/L  	Neuro: Awake  	Skin: Warm and dry  	Vascular access: L. Femoral catheter      LABS/STUDIES  --------------------------------------------------------------------------------              8.2    9.58  >-----------<  148      [12-30-22 @ 00:36]              26.5     137  |  96  |  100  ----------------------------<  201      [12-30-22 @ 00:33]  4.2   |  25  |  5.36        Ca     8.8     [12-30-22 @ 00:33]      Mg     2.7     [12-30-22 @ 00:33]      Phos  7.3     [12-30-22 @ 00:33]    TPro  6.7  /  Alb  3.1  /  TBili  0.8  /  DBili  x   /  AST  32  /  ALT  19  /  AlkPhos  82  [12-30-22 @ 00:33]    PT/INR: PT 12.2 , INR 1.06       [12-29-22 @ 00:35]  PTT: 31.2       [12-29-22 @ 00:35]      Creatinine Trend:  SCr 5.36 [12-30 @ 00:33]  SCr 4.64 [12-29 @ 00:35]  SCr 5.50 [12-28 @ 00:40]  SCr 4.64 [12-27 @ 00:28]  SCr 5.35 [12-26 @ 00:25]    Urinalysis - [12-26-22 @ 13:19]      Color Yellow / Appearance Clear / SG 1.013 / pH 6.0      Gluc Negative / Ketone Negative  / Bili Negative / Urobili Negative       Blood Large / Protein 100 mg/dL / Leuk Est Moderate / Nitrite Negative      RBC >50 / WBC 35 / Hyaline 1 / Gran  / Sq Epi  / Non Sq Epi 2 / Bacteria Few      TSH 2.84      [12-10-22 @ 15:55]  Lipid: chol --, , HDL --, LDL --      [12-05-22 @ 00:50]    HBsAb 889.7      [12-20-22 @ 11:25]  HBcAb Reactive      [12-20-22 @ 11:25]  HCV 0.15, Nonreact      [12-20-22 @ 11:25]     [facility] DIVISION OF KIDNEY DISEASES AND HYPERTENSION -- FOLLOW UP NOTE  --------------------------------------------------------------------------------  Chief Complaint:Acute pancreatitis without infection or necrosis    HPI:  63 y/o male with PMH of CABG, DM, HTN, HLD presenting as transfer from [place] for c/f STEMI. Course c/b gallstone pancreatitis leading to ARDS and shock & cardiac arrest now on [facility] ECMO. Had significant troponin elevation and his LVEF down to 8%. Pt was deemed to be too unstable to have an angiogram and potential PCI due to his co-morbidities & a new subdural bleed. Pt being seen for ERIC. SCr on arrival was 2.15; trended down to hector 1.6 on 11/25 and eventually increased to 3.95 11/28. Pt received IV diuretics as per CTU. Pt initiated on CRRT on 12/5/22 to optimize volume status and electrolytes.  Pt underwent decannulation of ECMO on 12/8/22. Pt was restarted on 12/9/22 for volume overload. Pt underwent mitral clip OR (12/16/22). s/p trach on 12/16     HPI: Pt. seen this AM. Tolerated UF session overnight. Breathing is improved. BP was low so UF terminated early. For HD today. with 2L as tolerated. Complaining of abdominal pain LLQ for me.         PAST HISTORY  --------------------------------------------------------------------------------  No significant changes to PMH, PSH, FHx, SHx, unless otherwise noted    ALLERGIES & MEDICATIONS  --------------------------------------------------------------------------------  Allergies    No Known Allergies    Intolerances      Standing Inpatient Medications  aMIOdarone    Tablet 200 milliGRAM(s) Oral daily  aspirin  chewable 81 milliGRAM(s) Oral daily  atorvastatin 80 milliGRAM(s) Oral at bedtime  caspofungin IVPB 50 milliGRAM(s) IV Intermittent every 24 hours  caspofungin IVPB      chlorhexidine 0.12% Liquid 15 milliLiter(s) Oral Mucosa every 12 hours  chlorhexidine 2% Cloths 1 Application(s) Topical <User Schedule>  dextrose 5%. 1000 milliLiter(s) IV Continuous <Continuous>  dextrose 5%. 1000 milliLiter(s) IV Continuous <Continuous>  dextrose 50% Injectable 50 milliLiter(s) IV Push every 15 minutes  glucagon  Injectable 1 milliGRAM(s) IntraMuscular once  heparin   Injectable 5000 Unit(s) SubCutaneous every 8 hours  insulin lispro (ADMELOG) corrective regimen sliding scale   SubCutaneous every 4 hours  insulin NPH human recombinant 7 Unit(s) SubCutaneous every 6 hours  insulin regular Infusion 3 Unit(s)/Hr IV Continuous <Continuous>  meropenem  IVPB 500 milliGRAM(s) IV Intermittent <User Schedule>  multivitamin/minerals/iron Oral Solution (CENTRUM) 15 milliLiter(s) Oral daily  pantoprazole  Injectable 40 milliGRAM(s) IV Push daily  senna 2 Tablet(s) Oral at bedtime  sevelamer carbonate Powder 800 milliGRAM(s) Enteral Tube two times a day  thiamine 100 milliGRAM(s) Enteral Tube daily    PRN Inpatient Medications  acetaminophen    Suspension .. 650 milliGRAM(s) Oral every 6 hours PRN  dextrose Oral Gel 15 Gram(s) Oral once PRN  sodium chloride 0.9% lock flush 10 milliLiter(s) IV Push every 1 hour PRN      REVIEW OF SYSTEMS  --------------------------------------------------------------------------------  As per HPI    VITALS/PHYSICAL EXAM  --------------------------------------------------------------------------------  T(C): 37.3 (12-30-22 @ 04:00), Max: 37.5 (12-29-22 @ 20:00)  HR: 98 (12-30-22 @ 08:27) (91 - 102)  BP: --  RR: 18 (12-30-22 @ 08:26) (5 - 28)  SpO2: 97% (12-30-22 @ 08:27) (96% - 100%)  Wt(kg): --        12-29-22 @ 07:01  -  12-30-22 @ 07:00  --------------------------------------------------------  IN: 1985 mL / OUT: 1815 mL / NET: 170 mL    12-30-22 @ 07:01  -  12-30-22 @ 08:55  --------------------------------------------------------  IN: 70 mL / OUT: 50 mL / NET: 20 mL      Physical Exam:  	Gen: ill appearing male  	HEENT: Anicteric  	Pulm:  trach+  	CV: S1S2+  	Abd: Soft, +BS, +abdominal pain LLQ      	Ext: trace LE edema B/L  	Neuro: Awake  	Skin: Warm and dry  	Vascular access: L. Femoral catheter      LABS/STUDIES  --------------------------------------------------------------------------------              8.2    9.58  >-----------<  148      [12-30-22 @ 00:36]              26.5     137  |  96  |  100  ----------------------------<  201      [12-30-22 @ 00:33]  4.2   |  25  |  5.36        Ca     8.8     [12-30-22 @ 00:33]      Mg     2.7     [12-30-22 @ 00:33]      Phos  7.3     [12-30-22 @ 00:33]    TPro  6.7  /  Alb  3.1  /  TBili  0.8  /  DBili  x   /  AST  32  /  ALT  19  /  AlkPhos  82  [12-30-22 @ 00:33]    PT/INR: PT 12.2 , INR 1.06       [12-29-22 @ 00:35]  PTT: 31.2       [12-29-22 @ 00:35]      Creatinine Trend:  SCr 5.36 [12-30 @ 00:33]  SCr 4.64 [12-29 @ 00:35]  SCr 5.50 [12-28 @ 00:40]  SCr 4.64 [12-27 @ 00:28]  SCr 5.35 [12-26 @ 00:25]    Urinalysis - [12-26-22 @ 13:19]      Color Yellow / Appearance Clear / SG 1.013 / pH 6.0      Gluc Negative / Ketone Negative  / Bili Negative / Urobili Negative       Blood Large / Protein 100 mg/dL / Leuk Est Moderate / Nitrite Negative      RBC >50 / WBC 35 / Hyaline 1 / Gran  / Sq Epi  / Non Sq Epi 2 / Bacteria Few      TSH 2.84      [12-10-22 @ 15:55]  Lipid: chol --, , HDL --, LDL --      [12-05-22 @ 00:50]    HBsAb 889.7      [12-20-22 @ 11:25]  HBcAb Reactive      [12-20-22 @ 11:25]  HCV 0.15, Nonreact      [12-20-22 @ 11:25]

## 2022-12-30 NOTE — PROGRESS NOTE ADULT - ATTENDING COMMENTS
ERIC/ATN, CKD III (apparent baseline Cr 1.5)  Required CRRT, now transitioned to intermittent hemodialysis  Kidney function remains poor    - Consider a 48 hour CRRT period to facilitate better volume removal and achieve euvolemia faster  - Hemodialysis today  - Diuretics for volume management - currently non-oliguric  - Dose meds for HD/eGFR<10  - Maintain MAP >65  - Not ESRD (still acute); would not proceed with AVF creation at this time  - D/w CTU team, remainder per fellow, will follow

## 2022-12-30 NOTE — CONSULT NOTE ADULT - SUBJECTIVE AND OBJECTIVE BOX
[facility] DEPARTMENT OF OPHTHALMOLOGY - INITIAL ADULT CONSULT  ----------------------------------------------------------------------------------------------------  Akira Olmedo, PGY-3  [phone], available on teams  ----------------------------------------------------------------------------------------------------    HPI:  Patient is a 61 y/o male with PMH of CABG, DM, HTN, HLD presenting as transfer from [place] for c/f STEMI. PTA arrival received 325 aspirin, 600 plavix, and no heparin drip started. Around 1:30 am patient had multiple episodes of non-bloody diarrhea and emesis with associated abdominal pain and chest pain, unable to localize, non-radiating, with associated SOB and no associated palpitations or diaphoresis. No recent fevers/chills, cough, rashes, or changes in urination. Does report mild diffuse back pain; started 5 days ago in setting on injury while walking and lifting objects. Denies focal weakness, numbness/tingling, or LOC due does report mild dizziness.    Patient seen and examined in ED. Hemodynamically stable, alert and awake, A&Ox3. Daughter at bedside. Reports abdominal pain, and nausea. Abdomen is soft, tender in epigastric area and RUQ. Denies chest pain, SOB. WBC 20, T.bili 3.4, Lipase 300. RUQ US demonstrated gallbladder stones, pericholecystic fluid.  (24 Nov 2022 15:10)    Interval History: ophthalmology consulted for retinal evaluation in the setting of candidemia 12/26/22. Pt was extubated recently and speaks very minimally. He endorse blurred vision OU, unable to say since when. Denies floaters, flashes or curtains. Denies eye pain. Wife at bedside unable to provide more information.     PAST MEDICAL & SURGICAL HISTORY:    Past Ocular History: none  Ophthalmic Medications: none  FAMILY HISTORY:  Social History: noncontributory     MEDICATIONS  (STANDING):  aMIOdarone    Tablet 200 milliGRAM(s) Oral daily  aspirin  chewable 81 milliGRAM(s) Oral daily  atorvastatin 80 milliGRAM(s) Oral at bedtime  caspofungin IVPB 50 milliGRAM(s) IV Intermittent every 24 hours  caspofungin IVPB      chlorhexidine 0.12% Liquid 15 milliLiter(s) Oral Mucosa every 12 hours  chlorhexidine 2% Cloths 1 Application(s) Topical <User Schedule>  dextrose 5%. 1000 milliLiter(s) (50 mL/Hr) IV Continuous <Continuous>  dextrose 5%. 1000 milliLiter(s) (100 mL/Hr) IV Continuous <Continuous>  dextrose 50% Injectable 50 milliLiter(s) IV Push every 15 minutes  glucagon  Injectable 1 milliGRAM(s) IntraMuscular once  heparin   Injectable 5000 Unit(s) SubCutaneous every 8 hours  insulin lispro (ADMELOG) corrective regimen sliding scale   SubCutaneous every 4 hours  insulin NPH human recombinant 7 Unit(s) SubCutaneous every 6 hours  insulin regular Infusion 3 Unit(s)/Hr (3 mL/Hr) IV Continuous <Continuous>  meropenem  IVPB 500 milliGRAM(s) IV Intermittent <User Schedule>  multivitamin/minerals/iron Oral Solution (CENTRUM) 15 milliLiter(s) Oral daily  pantoprazole  Injectable 40 milliGRAM(s) IV Push daily  senna 2 Tablet(s) Oral at bedtime  sevelamer carbonate Powder 800 milliGRAM(s) Enteral Tube two times a day  thiamine 100 milliGRAM(s) Enteral Tube daily    MEDICATIONS  (PRN):  acetaminophen    Suspension .. 650 milliGRAM(s) Oral every 6 hours PRN Temp greater or equal to 38.5C (101.3F)  dextrose Oral Gel 15 Gram(s) Oral once PRN Blood Glucose LESS THAN 70 milliGRAM(s)/deciliter  sodium chloride 0.9% lock flush 10 milliLiter(s) IV Push every 1 hour PRN Pre/post blood products, medications, blood draw, and to maintain line patency    Allergies & Intolerances: NKDA    Review of Systems:  Constitutional: No fever, chills  Eyes: + blurry vision, NO flashes, floaters, FBS, erythema, discharge, double vision, OU  Neuro: No tremors  Cardiovascular: No chest pain, palpitations  Respiratory: No SOB, no cough  GI: No nausea, vomiting, abdominal pain  : No dysuria  Skin: no rash  Psych: no depression  Endocrine: no polyuria, polydipsia  Heme/lymph: no swelling    VITALS: T(C): 36 (12-30-22 @ 12:00)  T(F): 96.8 (12-30-22 @ 12:00), Max: 99.5 (12-29-22 @ 20:00)  HR: 100 (12-30-22 @ 14:00) (91 - 104)  BP: --  RR:  (7 - 28)  SpO2:  (96% - 100%)  Wt(kg): --  General: AAO x 3, appropriate mood and affect    Ophthalmology Exam:  Visual acuity (CC): OD 20/50 OS 20/40  Pupils: PERRL OU, no APD  Ttono: OD 11 OS 13  Extraocular movements (EOMs): Full OU, no pain, no diplopia  Confrontational Visual Field (CVF): Full OU  Color Plates: 12/12 OD 12/12 OS    Pen Light Exam (PLE)  External: Flat OU  Lids/Lashes/Lacrimal Ducts: Flat OU    Sclera/Conjunctiva: W+Q OU  Cornea: Cl OU  Anterior Chamber: D+F OU    Iris: Flat OU  Lens: Cl OU    Fundus Exam: dilated with 1% tropicamide and 2.5% phenylephrine  Approval obtained from primary team for dilation  Patient aware that pupils can remained dilated for at least 4-6 hours  Exam performed with 20D lens    Vitreous: wnl OU  Disc, cup/disc: sharp and pink, 0.4 OD 0.2 OS  Macula: wnl OU  Vessels: wnl OU  Periphery: wnl OU    Labs/Imaging:    Culture - Blood (12.29.22 @ 00:56)    Specimen Source: .Blood Blood-Peripheral    Culture Results:   No growth to date.    Culture - Blood (12.27.22 @ 19:30)    Specimen Source: .Blood Blood-Peripheral    Culture Results:   No growth to date.    Culture - Blood (12.26.22 @ 02:46)    -  Candida tropicalis: Detec    Gram Stain:   Growth in aerobic bottle: Yeast like cells    Specimen Source: .Blood Blood-Peripheral    Organism: Blood Culture PCR    Culture Results:   Growth in aerobic bottle: Candida tropicalis  ***Blood Panel PCR results on this specimen are available  approximately 3 hours after the Gram stain result.***  Gram stain, PCR, and/or culture results may not always  correspond due to difference in methodologies.  ************************************************************  This PCR assay was performed by multiplex PCR. This  Assay tests for 66 bacterial and resistance gene targets.  Please refer to the [facility] Labs test directory  at [email or web address] for details.    Organism Identification: Blood Culture PCR    Method Type: PCR     [facility] DEPARTMENT OF OPHTHALMOLOGY - INITIAL ADULT CONSULT  ----------------------------------------------------------------------------------------------------  Akira Olmedo, PGY-3  [phone], available on teams  ----------------------------------------------------------------------------------------------------    HPI:  Patient is a 61 y/o male with PMH of CABG, DM, HTN, HLD presenting as transfer from [place] for c/f STEMI. PTA arrival received 325 aspirin, 600 plavix, and no heparin drip started. Around 1:30 am patient had multiple episodes of non-bloody diarrhea and emesis with associated abdominal pain and chest pain, unable to localize, non-radiating, with associated SOB and no associated palpitations or diaphoresis. No recent fevers/chills, cough, rashes, or changes in urination. Does report mild diffuse back pain; started 5 days ago in setting on injury while walking and lifting objects. Denies focal weakness, numbness/tingling, or LOC due does report mild dizziness.    Patient seen and examined in ED. Hemodynamically stable, alert and awake, A&Ox3. Daughter at bedside. Reports abdominal pain, and nausea. Abdomen is soft, tender in epigastric area and RUQ. Denies chest pain, SOB. WBC 20, T.bili 3.4, Lipase 300. RUQ US demonstrated gallbladder stones, pericholecystic fluid.  (24 Nov 2022 15:10)    Interval History: ophthalmology consulted for retinal evaluation in the setting of candidemia 12/26/22. Pt was extubated recently and speaks very minimally. He endorse blurred vision OU, unable to say since when. Denies floaters, flashes or curtains. Denies eye pain. Wife at bedside unable to provide more information.     PAST MEDICAL & SURGICAL HISTORY:    Past Ocular History: none  Ophthalmic Medications: none  FAMILY HISTORY:  Social History: noncontributory     MEDICATIONS  (STANDING):  aMIOdarone    Tablet 200 milliGRAM(s) Oral daily  aspirin  chewable 81 milliGRAM(s) Oral daily  atorvastatin 80 milliGRAM(s) Oral at bedtime  caspofungin IVPB 50 milliGRAM(s) IV Intermittent every 24 hours  caspofungin IVPB      chlorhexidine 0.12% Liquid 15 milliLiter(s) Oral Mucosa every 12 hours  chlorhexidine 2% Cloths 1 Application(s) Topical <User Schedule>  dextrose 5%. 1000 milliLiter(s) (50 mL/Hr) IV Continuous <Continuous>  dextrose 5%. 1000 milliLiter(s) (100 mL/Hr) IV Continuous <Continuous>  dextrose 50% Injectable 50 milliLiter(s) IV Push every 15 minutes  glucagon  Injectable 1 milliGRAM(s) IntraMuscular once  heparin   Injectable 5000 Unit(s) SubCutaneous every 8 hours  insulin lispro (ADMELOG) corrective regimen sliding scale   SubCutaneous every 4 hours  insulin NPH human recombinant 7 Unit(s) SubCutaneous every 6 hours  insulin regular Infusion 3 Unit(s)/Hr (3 mL/Hr) IV Continuous <Continuous>  meropenem  IVPB 500 milliGRAM(s) IV Intermittent <User Schedule>  multivitamin/minerals/iron Oral Solution (CENTRUM) 15 milliLiter(s) Oral daily  pantoprazole  Injectable 40 milliGRAM(s) IV Push daily  senna 2 Tablet(s) Oral at bedtime  sevelamer carbonate Powder 800 milliGRAM(s) Enteral Tube two times a day  thiamine 100 milliGRAM(s) Enteral Tube daily    MEDICATIONS  (PRN):  acetaminophen    Suspension .. 650 milliGRAM(s) Oral every 6 hours PRN Temp greater or equal to 38.5C (101.3F)  dextrose Oral Gel 15 Gram(s) Oral once PRN Blood Glucose LESS THAN 70 milliGRAM(s)/deciliter  sodium chloride 0.9% lock flush 10 milliLiter(s) IV Push every 1 hour PRN Pre/post blood products, medications, blood draw, and to maintain line patency    Allergies & Intolerances: NKDA    Review of Systems:  Constitutional: No fever, chills  Eyes: + blurry vision, NO flashes, floaters, FBS, erythema, discharge, double vision, OU  Neuro: No tremors  Cardiovascular: No chest pain, palpitations  Respiratory: No SOB, no cough  GI: No nausea, vomiting, abdominal pain  : No dysuria  Skin: no rash  Psych: no depression  Endocrine: no polyuria, polydipsia  Heme/lymph: no swelling    VITALS: T(C): 36 (12-30-22 @ 12:00)  T(F): 96.8 (12-30-22 @ 12:00), Max: 99.5 (12-29-22 @ 20:00)  HR: 100 (12-30-22 @ 14:00) (91 - 104)  BP: --  RR:  (7 - 28)  SpO2:  (96% - 100%)  Wt(kg): --  General: AAO x 3, appropriate mood and affect    Ophthalmology Exam:  Visual acuity (CC): OD 20/50 OS 20/40  Pupils: PERRL OU, no APD  Ttono: OD 11 OS 13  Extraocular movements (EOMs): Full OU, no pain, no diplopia  Confrontational Visual Field (CVF): Full OU  Color Plates: 12/12 OD 12/12 OS    Pen Light Exam (PLE)  External: Flat OU  Lids/Lashes/Lacrimal Ducts: Flat OU    Sclera/Conjunctiva: W+Q OU  Cornea: Cl OU  Anterior Chamber: D+F OU    Iris: Flat OU  Lens: Cl OU    Fundus Exam: dilated with 1% tropicamide and 2.5% phenylephrine  Approval obtained from primary team for dilation  Patient aware that pupils can remained dilated for at least 4-6 hours  Exam performed with 20D lens    Vitreous: wnl OU  Disc, cup/disc: sharp and pink, 0.4 OD 0.2 OS  Macula: wnl OU  Vessels: wnl OU  Periphery: wnl OU    Labs/Imaging:    Culture - Blood (12.29.22 @ 00:56)    Specimen Source: .Blood Blood-Peripheral    Culture Results:   No growth to date.    Culture - Blood (12.27.22 @ 19:30)    Specimen Source: .Blood Blood-Peripheral    Culture Results:   No growth to date.    Culture - Blood (12.26.22 @ 02:46)    -  Candida tropicalis: Detec    Gram Stain:   Growth in aerobic bottle: Yeast like cells    Specimen Source: .Blood Blood-Peripheral    Organism: Blood Culture PCR    Culture Results:   Growth in aerobic bottle: Candida tropicalis  ***Blood Panel PCR results on this specimen are available  approximately 3 hours after the Gram stain result.***  Gram stain, PCR, and/or culture results may not always  correspond due to difference in methodologies.  ************************************************************  This PCR assay was performed by multiplex PCR. This  Assay tests for 66 bacterial and resistance gene targets.  Please refer to the [facility] Labs test directory  at [email or web address] for details.    Organism Identification: Blood Culture PCR    Method Type: PCR     [facility] DEPARTMENT OF OPHTHALMOLOGY - INITIAL ADULT CONSULT  ----------------------------------------------------------------------------------------------------  Akira Olmedo, PGY-3  [phone], available on teams  ----------------------------------------------------------------------------------------------------    HPI:  Patient is a 63 y/o male with PMH of CABG, DM, HTN, HLD presenting as transfer from [place] for c/f STEMI. PTA arrival received 325 aspirin, 600 plavix, and no heparin drip started. Around 1:30 am patient had multiple episodes of non-bloody diarrhea and emesis with associated abdominal pain and chest pain, unable to localize, non-radiating, with associated SOB and no associated palpitations or diaphoresis. No recent fevers/chills, cough, rashes, or changes in urination. Does report mild diffuse back pain; started 5 days ago in setting on injury while walking and lifting objects. Denies focal weakness, numbness/tingling, or LOC due does report mild dizziness.    Patient seen and examined in ED. Hemodynamically stable, alert and awake, A&Ox3. Daughter at bedside. Reports abdominal pain, and nausea. Abdomen is soft, tender in epigastric area and RUQ. Denies chest pain, SOB. WBC 20, T.bili 3.4, Lipase 300. RUQ US demonstrated gallbladder stones, pericholecystic fluid.  (24 Nov 2022 15:10)    Interval History: ophthalmology consulted for retinal evaluation in the setting of candidemia 12/26/22. Pt was extubated recently and speaks very minimally. He endorse blurred vision OU, unable to say since when. Denies floaters, flashes or curtains. Denies eye pain. Wife at bedside unable to provide more information.     PAST MEDICAL & SURGICAL HISTORY:    Past Ocular History: none  Ophthalmic Medications: none  FAMILY HISTORY:  Social History: noncontributory     MEDICATIONS  (STANDING):  aMIOdarone    Tablet 200 milliGRAM(s) Oral daily  aspirin  chewable 81 milliGRAM(s) Oral daily  atorvastatin 80 milliGRAM(s) Oral at bedtime  caspofungin IVPB 50 milliGRAM(s) IV Intermittent every 24 hours  caspofungin IVPB      chlorhexidine 0.12% Liquid 15 milliLiter(s) Oral Mucosa every 12 hours  chlorhexidine 2% Cloths 1 Application(s) Topical <User Schedule>  dextrose 5%. 1000 milliLiter(s) (50 mL/Hr) IV Continuous <Continuous>  dextrose 5%. 1000 milliLiter(s) (100 mL/Hr) IV Continuous <Continuous>  dextrose 50% Injectable 50 milliLiter(s) IV Push every 15 minutes  glucagon  Injectable 1 milliGRAM(s) IntraMuscular once  heparin   Injectable 5000 Unit(s) SubCutaneous every 8 hours  insulin lispro (ADMELOG) corrective regimen sliding scale   SubCutaneous every 4 hours  insulin NPH human recombinant 7 Unit(s) SubCutaneous every 6 hours  insulin regular Infusion 3 Unit(s)/Hr (3 mL/Hr) IV Continuous <Continuous>  meropenem  IVPB 500 milliGRAM(s) IV Intermittent <User Schedule>  multivitamin/minerals/iron Oral Solution (CENTRUM) 15 milliLiter(s) Oral daily  pantoprazole  Injectable 40 milliGRAM(s) IV Push daily  senna 2 Tablet(s) Oral at bedtime  sevelamer carbonate Powder 800 milliGRAM(s) Enteral Tube two times a day  thiamine 100 milliGRAM(s) Enteral Tube daily    MEDICATIONS  (PRN):  acetaminophen    Suspension .. 650 milliGRAM(s) Oral every 6 hours PRN Temp greater or equal to 38.5C (101.3F)  dextrose Oral Gel 15 Gram(s) Oral once PRN Blood Glucose LESS THAN 70 milliGRAM(s)/deciliter  sodium chloride 0.9% lock flush 10 milliLiter(s) IV Push every 1 hour PRN Pre/post blood products, medications, blood draw, and to maintain line patency    Allergies & Intolerances: NKDA    Review of Systems:  Constitutional: No fever, chills  Eyes: + blurry vision, NO flashes, floaters, FBS, erythema, discharge, double vision, OU  Neuro: No tremors  Cardiovascular: No chest pain, palpitations  Respiratory: No SOB, no cough  GI: No nausea, vomiting, abdominal pain  : No dysuria  Skin: no rash  Psych: no depression  Endocrine: no polyuria, polydipsia  Heme/lymph: no swelling    VITALS: T(C): 36 (12-30-22 @ 12:00)  T(F): 96.8 (12-30-22 @ 12:00), Max: 99.5 (12-29-22 @ 20:00)  HR: 100 (12-30-22 @ 14:00) (91 - 104)  BP: --  RR:  (7 - 28)  SpO2:  (96% - 100%)  Wt(kg): --  General: AAO x 3, appropriate mood and affect    Ophthalmology Exam:  Visual acuity (CC): OD 20/50 OS 20/40  Pupils: PERRL OU, no APD  Ttono: OD 11 OS 13  Extraocular movements (EOMs): Full OU, no pain, no diplopia  Confrontational Visual Field (CVF): Full OU  Color Plates: 12/12 OD 12/12 OS    Pen Light Exam (PLE)  External: Flat OU  Lids/Lashes/Lacrimal Ducts: Flat OU    Sclera/Conjunctiva: W+Q OU  Cornea: Cl OU  Anterior Chamber: D+F OU    Iris: Flat OU  Lens: Cl OU    Fundus Exam: dilated with 1% tropicamide and 2.5% phenylephrine  Approval obtained from primary team for dilation  Patient aware that pupils can remained dilated for at least 4-6 hours  Exam performed with 20D lens    Vitreous: wnl OU  Disc, cup/disc: sharp and pink, 0.4 OD 0.2 OS  Macula: wnl OU  Vessels: wnl OU  Periphery: wnl OU    Labs/Imaging:    Culture - Blood (12.29.22 @ 00:56)    Specimen Source: .Blood Blood-Peripheral    Culture Results:   No growth to date.    Culture - Blood (12.27.22 @ 19:30)    Specimen Source: .Blood Blood-Peripheral    Culture Results:   No growth to date.    Culture - Blood (12.26.22 @ 02:46)    -  Candida tropicalis: Detec    Gram Stain:   Growth in aerobic bottle: Yeast like cells    Specimen Source: .Blood Blood-Peripheral    Organism: Blood Culture PCR    Culture Results:   Growth in aerobic bottle: Candida tropicalis  ***Blood Panel PCR results on this specimen are available  approximately 3 hours after the Gram stain result.***  Gram stain, PCR, and/or culture results may not always  correspond due to difference in methodologies.  ************************************************************  This PCR assay was performed by multiplex PCR. This  Assay tests for 66 bacterial and resistance gene targets.  Please refer to the [facility] Labs test directory  at [email or web address] for details.    Organism Identification: Blood Culture PCR    Method Type: PCR

## 2022-12-30 NOTE — PROGRESS NOTE ADULT - PROBLEM SELECTOR PLAN 1
Pt with non oliguric ERIC/ ATN in the setting of STEMI, cardiac arrest & cardiogenic shock  SCr on arrival was 2.15; trended down to hector 1.6 on 11/25; slowly trended up & increased to 3.95 11/28.   Pt initiated on CRRT/CVVHDF to optimize volume and electrolytes on 12/5/22. Pt likely with ATN. Pt underwent decannulation of ECMO on 12/8/22 and was taken off CRRT. Pt reinitiated on CRRT overnight on 12/9/22 for volume overload. s/p trach on 12/16. CRRT stopped again 12/19. Bladder scan daily. Now with [facility]. Off Lasix gtt.   Plan for HD today. Consider using Midodrine before/during HD so that Pt. can tolerate session.  Will need tunneled catheter placed for ongoing dialysis needs once cleared from infectious standpoint.     Monitor labs and urine output. Avoid any potential nephrotoxins. Dose medications as per HD.

## 2022-12-30 NOTE — PROGRESS NOTE ADULT - ASSESSMENT
62M CAD s/p CABG, DM, HTN, presented as a trasnfer from [place] for STEMI, found to have pancreatitis not necrotizing and cholecystitis on CT AP, admitted to to SICU for respiratory failure due to ARDS. Moved to the CTU for ECMO for cardiogenic shock vs ARDS, ECMO decannulated on 12/8 with IABP placement and mitral clip and removed on 12/17, s/p trac on 12/16/22, now on trach collar.    #Candidemia  Unclear source  Bcx positive for candida tropicalis on 12/26/22  Repeat with no growth to date  Limited TTE shows no evidence for valvular vegetations, mitral clip in place    #ESRD on HD  Nephrology following  Femoral shiley in place    Recommendations:  Continue caspofungin 50mg daily  Continue meropenem for now, will plan to de-escalate if Bcx negative for bacteria and clinical improvement  Follow repeat blood cultures  Will need additional TTE in the next two weeks  Ophthalmology evaluation  Would remove lines as able due to concern for potential seeding of infection  Follow fever curve and WBC count    Ricky Burt MD  Division of Infectious Diseases  Available on Teams                    62M CAD s/p CABG, DM, HTN, presented as a trasnfer from [place] for STEMI, found to have pancreatitis not necrotizing and cholecystitis on CT AP, admitted to to SICU for respiratory failure due to ARDS. Moved to the CTU for ECMO for cardiogenic shock vs ARDS, ECMO decannulated on 12/8 with IABP placement and mitral clip and removed on 12/17, s/p trac on 12/16/22, now on trach collar.    #Candidemia  Unclear source  Bcx positive for candida tropicalis on 12/26/22  Repeat with no growth to date  Limited TTE shows no evidence for valvular vegetations, mitral clip in place    #ESRD on HD  Nephrology following  Femoral shiley in place    Recommendations:  Continue caspofungin 50mg daily  Continue meropenem for now, will plan to de-escalate if Bcx negative for bacteria and clinical improvement  Follow repeat blood cultures  Will need additional TTE in the next two weeks  Ophthalmology evaluation  Would remove lines as able due to concern for potential seeding of infection  Follow fever curve and WBC count    Ricky Burt MD  Division of Infectious Diseases  Available on Teams                    62M CAD s/p CABG, DM, HTN, presented as a trasnfer from [place] for STEMI, found to have pancreatitis not necrotizing and cholecystitis on CT AP, admitted to to SICU for respiratory failure due to ARDS. Moved to the CTU for ECMO for cardiogenic shock vs ARDS, ECMO decannulated on 12/8 with IABP placement and mitral clip and removed on 12/17, s/p trac on 12/16/22, now on trach collar.    #Candidemia  Unclear source  Bcx positive for candida tropicalis on 12/26/22  Repeat with no growth to date  Limited TTE shows no evidence for valvular vegetations, mitral clip in place    #ESRD on HD  Nephrology following  Femoral shiley in place    Recommendations:  Continue caspofungin 50mg daily  Continue meropenem for now, will plan to de-escalate if Bcx negative for bacteria and clinical improvement  Follow repeat blood cultures  Will need additional TTE in the next two weeks  Ophthalmology evaluation  Would remove lines as able due to concern for potential seeding of infection  Follow fever curve and WBC count    Ricky Burt MD  Division of Infectious Diseases  Available on Teams

## 2022-12-30 NOTE — PROGRESS NOTE ADULT - PROBLEM SELECTOR PLAN 2
Hgb below goal, Please perform anemia workup.  Check iron studies. Will need to optimize TSAT before starting MIGUEL. If above 20% will start MIGUEL.     If you have any questions, please feel free to contact me  Arsalan Cochran  Nephrology Fellow  [phone]; Prefer [employer] TEAMS  (After 5pm or on weekends please page the on-call fellow) Hgb below goal, Please perform anemia workup.  Check iron studies. Will need to optimize TSAT before starting MIGUEL. If above 20% will start MIGUEL.     If you have any questions, please feel free to contact me  Arsalan Cochran  Nephrology Fellow  [phone]; Prefer [employer] TEAMS  (After 5pm or on weekends please page the on-call fellow) Hgb below goal, Please perform anemia workup.  Check iron studies. Will need to optimize TSAT before starting MIGUEL. If above 20% will start MIGUEL.     If you have any questions, please feel free to contact me  Arsalan Cochran  Nephrology Fellow  [phone]; Prefer [employer] TEAMS  (After 5pm or on weekends please page the on-call fellow)

## 2022-12-30 NOTE — PROGRESS NOTE ADULT - SUBJECTIVE AND OBJECTIVE BOX
CRITICAL CARE ATTENDING - CTICU    MEDICATIONS  (STANDING):  aMIOdarone    Tablet 200 milliGRAM(s) Oral daily  aspirin  chewable 81 milliGRAM(s) Oral daily  atorvastatin 80 milliGRAM(s) Oral at bedtime  caspofungin IVPB 50 milliGRAM(s) IV Intermittent every 24 hours  caspofungin IVPB      chlorhexidine 0.12% Liquid 15 milliLiter(s) Oral Mucosa every 12 hours  chlorhexidine 2% Cloths 1 Application(s) Topical <User Schedule>  dextrose 5%. 1000 milliLiter(s) (50 mL/Hr) IV Continuous <Continuous>  dextrose 5%. 1000 milliLiter(s) (100 mL/Hr) IV Continuous <Continuous>  dextrose 50% Injectable 50 milliLiter(s) IV Push every 15 minutes  glucagon  Injectable 1 milliGRAM(s) IntraMuscular once  heparin   Injectable 5000 Unit(s) SubCutaneous every 8 hours  insulin lispro (ADMELOG) corrective regimen sliding scale   SubCutaneous every 4 hours  insulin NPH human recombinant 7 Unit(s) SubCutaneous every 6 hours  insulin regular Infusion 3 Unit(s)/Hr (3 mL/Hr) IV Continuous <Continuous>  meropenem  IVPB 500 milliGRAM(s) IV Intermittent <User Schedule>  multivitamin/minerals/iron Oral Solution (CENTRUM) 15 milliLiter(s) Oral daily  pantoprazole  Injectable 40 milliGRAM(s) IV Push daily  senna 2 Tablet(s) Oral at bedtime  sevelamer carbonate Powder 800 milliGRAM(s) Enteral Tube two times a day  thiamine 100 milliGRAM(s) Enteral Tube daily                                    8.2    9.58  )-----------( 148      ( 30 Dec 2022 00:36 )             26.5       12-30    137  |  96  |  100<H>  ----------------------------<  201<H>  4.2   |  25  |  5.36<H>    Ca    8.8      30 Dec 2022 00:33  Phos  7.3     12-30  Mg     2.7     12-30    TPro  6.7  /  Alb  3.1<L>  /  TBili  0.8  /  DBili  x   /  AST  32  /  ALT  19  /  AlkPhos  82  12-30      PT/INR - ( 29 Dec 2022 00:35 )   PT: 12.2 sec;   INR: 1.06 ratio         PTT - ( 29 Dec 2022 00:35 )  PTT:31.2 sec    Mode: vent off      Daily     Daily Weight in k.1 (30 Dec 2022 00:00)       @ 07:  -   @ 07:00  --------------------------------------------------------  IN: 2649.5 mL / OUT: 3130 mL / NET: -480.5 mL     @ 07:01  -   @ 06:46  --------------------------------------------------------  IN: 1910 mL / OUT: 1775 mL / NET: 135 mL        Critically Ill patient  : [ ] preoperative , [x] Non Operative    Requires :  [x ] Arterial Line   [ ] Central Line  [ ] PA catheter  [ ] IABP [ ] ECMO  [ ] LVAD  [x ] Ventilator  [ ] pacemaker [x] Trach  [x] HD                      [x ] ABG's     [x ] Pulse Oxymetry Monitoring  Bedside evaluation , monitoring , treatment of hemodynamics , fluids , IVP/ IVCD meds.      Diagnosis:      - [facility] - [facility]     ECMO Decannulation      POD 12- IABP placed in cath lab / IABP dc'ed     POD - Mitral Clip x1, TRACH 7 Merari XLT prox with ENT    MI     Acute Pancreatitis     ARDS     Hemodynamic lability,  instability. Requires IVCD [ ] vasopressors [ ]  inotropes  [ ] vasodilator  [x]IVSS fluid  to maintain MAP, perfusion, C.I.     Post Arrest Shock state - resolving    Respiratory Failure - Tracheostomy     Requires chest PT, pulmonary toilet, ambu bagging, suctioning to maintain SaO2,  patent airway and treat atelectasis.     Ventilator Management:  [x ]AC-rest    [ x]CPAP-PS Wean    [x]Trach Collar     [ ]Extubate    [ ] T-Piece  [ ]peep>5     Difficult weaning process - multiple organ system involvement in critically ill patient     CHF- acute [ x]   chronic [  ]    systolic [ x]   diatolic [ ]          - Echo- EF -   20%  Severe segmental LV systolic dysfunction          [ ] RV dysfunction     - Cxr-cardiomegally, edema          - Clinical-  [ ]inotropes   [ ]pressors  [ ]diuresis   [ ]IABP      [ ]LVAD   [x]Respiratory Failure.     Cardiogenic Shock - resolving    [ x] Hemodialysis  [ x] Hemofiltration:   today   [x ] negative fluid balance [ ] even fluid balance [ ] positive fluid balance, in a hemodynamically unstable patient.     Fluid  overload     Hypotension     DM- IVCD insulin/ NPH/ Admelog sliding scale    Renal Failure - Acute Kidney Injury     Requires bedside physical therapy, mobilization and total senior living care.     Tolerates NG / NJ feeds at [x] goal rate  [ ] trophic rate    [ ]       rate     fevers     UA c/w infection     Opthamology consult today               I, Finn Zelaya, personally performed the services described in this documentation. All medical record entries made by the scribe were at my direction and in my presence. I have reviewed the chart and agree that the record reflects my personal performance and is accurate and complete.   Finn Zelaya MD.       By signing my name below, I, Abhijit Ngo, attest that this documentation has been prepared under the direction and in the presence of Finn Zelaya MD.   Electronically Signed: Brian Slaughter 22 @ 06:46        Discussed with CT surgeon, Physician Assistant - Nurse Practitioner- Critical care medicine team.   Dicussed at  AM / PM rounds.   Chart, labs , films reviewed.    Cumulative Critical Care Time Given Today:  CRITICAL CARE ATTENDING - CTICU    MEDICATIONS  (STANDING):  aMIOdarone    Tablet 200 milliGRAM(s) Oral daily  aspirin  chewable 81 milliGRAM(s) Oral daily  atorvastatin 80 milliGRAM(s) Oral at bedtime  caspofungin IVPB 50 milliGRAM(s) IV Intermittent every 24 hours  caspofungin IVPB      chlorhexidine 0.12% Liquid 15 milliLiter(s) Oral Mucosa every 12 hours  chlorhexidine 2% Cloths 1 Application(s) Topical <User Schedule>  dextrose 5%. 1000 milliLiter(s) (50 mL/Hr) IV Continuous <Continuous>  dextrose 5%. 1000 milliLiter(s) (100 mL/Hr) IV Continuous <Continuous>  dextrose 50% Injectable 50 milliLiter(s) IV Push every 15 minutes  glucagon  Injectable 1 milliGRAM(s) IntraMuscular once  heparin   Injectable 5000 Unit(s) SubCutaneous every 8 hours  insulin lispro (ADMELOG) corrective regimen sliding scale   SubCutaneous every 4 hours  insulin NPH human recombinant 7 Unit(s) SubCutaneous every 6 hours  insulin regular Infusion 3 Unit(s)/Hr (3 mL/Hr) IV Continuous <Continuous>  meropenem  IVPB 500 milliGRAM(s) IV Intermittent <User Schedule>  multivitamin/minerals/iron Oral Solution (CENTRUM) 15 milliLiter(s) Oral daily  pantoprazole  Injectable 40 milliGRAM(s) IV Push daily  senna 2 Tablet(s) Oral at bedtime  sevelamer carbonate Powder 800 milliGRAM(s) Enteral Tube two times a day  thiamine 100 milliGRAM(s) Enteral Tube daily                                    8.2    9.58  )-----------( 148      ( 30 Dec 2022 00:36 )             26.5       12-30    137  |  96  |  100<H>  ----------------------------<  201<H>  4.2   |  25  |  5.36<H>    Ca    8.8      30 Dec 2022 00:33  Phos  7.3     12-30  Mg     2.7     12-30    TPro  6.7  /  Alb  3.1<L>  /  TBili  0.8  /  DBili  x   /  AST  32  /  ALT  19  /  AlkPhos  82  12-30      PT/INR - ( 29 Dec 2022 00:35 )   PT: 12.2 sec;   INR: 1.06 ratio         PTT - ( 29 Dec 2022 00:35 )  PTT:31.2 sec    Mode: vent off      Daily     Daily Weight in k.1 (30 Dec 2022 00:00)       @ 07:  -   @ 07:00  --------------------------------------------------------  IN: 2649.5 mL / OUT: 3130 mL / NET: -480.5 mL     @ 07:01  -   @ 06:46  --------------------------------------------------------  IN: 1910 mL / OUT: 1775 mL / NET: 135 mL        Critically Ill patient  : [ ] preoperative , [x] Non Operative    Requires :  [x ] Arterial Line   [ ] Central Line  [ ] PA catheter  [ ] IABP [ ] ECMO  [ ] LVAD  [x ] Ventilator  [ ] pacemaker [x] Trach  [x] HD                      [x ] ABG's     [x ] Pulse Oxymetry Monitoring  Bedside evaluation , monitoring , treatment of hemodynamics , fluids , IVP/ IVCD meds.      Diagnosis:      - [facility] - arrest in [facility]     ECMO Decannulation      POD 12- IABP placed in cath lab / IABP dc'ed     POD - Mitral Clip x1, TRACH 7 Merari XLT prox with ENT    MI     Acute Pancreatitis     ARDS     Hemodynamic lability,  instability. Requires IVCD [ ] vasopressors [ ]  inotropes  [ ] vasodilator  [x]IVSS fluid  to maintain MAP, perfusion, C.I.     Post Arrest Shock state - resolving    Respiratory Failure - Tracheostomy     Requires chest PT, pulmonary toilet, ambu bagging, suctioning to maintain SaO2,  patent airway and treat atelectasis.     Ventilator Management:  [x ]AC-rest    [ x]CPAP-PS Wean    [x]Trach Collar     [ ]Extubate    [ ] T-Piece  [ ]peep>5     Difficult weaning process - multiple organ system involvement in critically ill patient     CHF- acute [ x]   chronic [  ]    systolic [ x]   diatolic [ ]          - Echo- EF -   20%  Severe segmental LV systolic dysfunction          [ ] RV dysfunction     - Cxr-cardiomegally, edema          - Clinical-  [ ]inotropes   [ ]pressors  [ ]diuresis   [ ]IABP      [ ]LVAD   [x]Respiratory Failure.     Cardiogenic Shock - resolving    [ x] Hemodialysis  [ x] Hemofiltration:   today   [x ] negative fluid balance [ ] even fluid balance [ ] positive fluid balance, in a hemodynamically unstable patient.     Fluid  overload     Hypotension     DM- IVCD insulin/ NPH/ Admelog sliding scale    Renal Failure - Acute Kidney Injury     Requires bedside physical therapy, mobilization and total correction care.     Tolerates NG / NJ feeds at [x] goal rate  [ ] trophic rate    [ ]       rate     fevers     UA c/w infection     Opthamology consult today               I, Finn Zelaya, personally performed the services described in this documentation. All medical record entries made by the scribe were at my direction and in my presence. I have reviewed the chart and agree that the record reflects my personal performance and is accurate and complete.   Finn Zelaya MD.       By signing my name below, I, Abhijit Ngo, attest that this documentation has been prepared under the direction and in the presence of Finn Zelaya MD.   Electronically Signed: Brian Slaughter 22 @ 06:46        Discussed with CT surgeon, Physician Assistant - Nurse Practitioner- Critical care medicine team.   Dicussed at  AM / PM rounds.   Chart, labs , films reviewed.    Cumulative Critical Care Time Given Today:  CRITICAL CARE ATTENDING - CTICU    MEDICATIONS  (STANDING):  aMIOdarone    Tablet 200 milliGRAM(s) Oral daily  aspirin  chewable 81 milliGRAM(s) Oral daily  atorvastatin 80 milliGRAM(s) Oral at bedtime  caspofungin IVPB 50 milliGRAM(s) IV Intermittent every 24 hours  caspofungin IVPB      chlorhexidine 0.12% Liquid 15 milliLiter(s) Oral Mucosa every 12 hours  chlorhexidine 2% Cloths 1 Application(s) Topical <User Schedule>  dextrose 5%. 1000 milliLiter(s) (50 mL/Hr) IV Continuous <Continuous>  dextrose 5%. 1000 milliLiter(s) (100 mL/Hr) IV Continuous <Continuous>  dextrose 50% Injectable 50 milliLiter(s) IV Push every 15 minutes  glucagon  Injectable 1 milliGRAM(s) IntraMuscular once  heparin   Injectable 5000 Unit(s) SubCutaneous every 8 hours  insulin lispro (ADMELOG) corrective regimen sliding scale   SubCutaneous every 4 hours  insulin NPH human recombinant 7 Unit(s) SubCutaneous every 6 hours  insulin regular Infusion 3 Unit(s)/Hr (3 mL/Hr) IV Continuous <Continuous>  meropenem  IVPB 500 milliGRAM(s) IV Intermittent <User Schedule>  multivitamin/minerals/iron Oral Solution (CENTRUM) 15 milliLiter(s) Oral daily  pantoprazole  Injectable 40 milliGRAM(s) IV Push daily  senna 2 Tablet(s) Oral at bedtime  sevelamer carbonate Powder 800 milliGRAM(s) Enteral Tube two times a day  thiamine 100 milliGRAM(s) Enteral Tube daily                                    8.2    9.58  )-----------( 148      ( 30 Dec 2022 00:36 )             26.5       12-30    137  |  96  |  100<H>  ----------------------------<  201<H>  4.2   |  25  |  5.36<H>    Ca    8.8      30 Dec 2022 00:33  Phos  7.3     12-30  Mg     2.7     12-30    TPro  6.7  /  Alb  3.1<L>  /  TBili  0.8  /  DBili  x   /  AST  32  /  ALT  19  /  AlkPhos  82  12-30      PT/INR - ( 29 Dec 2022 00:35 )   PT: 12.2 sec;   INR: 1.06 ratio         PTT - ( 29 Dec 2022 00:35 )  PTT:31.2 sec    Mode: vent off      Daily     Daily Weight in k.1 (30 Dec 2022 00:00)       @ 07:  -   @ 07:00  --------------------------------------------------------  IN: 2649.5 mL / OUT: 3130 mL / NET: -480.5 mL     @ 07:01  -   @ 06:46  --------------------------------------------------------  IN: 1910 mL / OUT: 1775 mL / NET: 135 mL        Critically Ill patient  : [ ] preoperative , [x] Non Operative    Requires :  [x ] Arterial Line   [ ] Central Line  [ ] PA catheter  [ ] IABP [ ] ECMO  [ ] LVAD  [x ] Ventilator  [ ] pacemaker [x] Trach  [x] HD                      [x ] ABG's     [x ] Pulse Oxymetry Monitoring  Bedside evaluation , monitoring , treatment of hemodynamics , fluids , IVP/ IVCD meds.      Diagnosis:      - [facility] - arrest in [facility]     ECMO Decannulation      POD 12- IABP placed in cath lab / IABP dc'ed     POD - Mitral Clip x1, TRACH 7 Merari XLT prox with ENT    MI     Acute Pancreatitis     ARDS     Hemodynamic lability,  instability. Requires IVCD [ ] vasopressors [ ]  inotropes  [ ] vasodilator  [x]IVSS fluid  to maintain MAP, perfusion, C.I.     Post Arrest Shock state - resolving    Respiratory Failure - Tracheostomy     Requires chest PT, pulmonary toilet, ambu bagging, suctioning to maintain SaO2,  patent airway and treat atelectasis.     Ventilator Management:  [x ]AC-rest    [ x]CPAP-PS Wean    [x]Trach Collar     [ ]Extubate    [ ] T-Piece  [ ]peep>5     Difficult weaning process - multiple organ system involvement in critically ill patient     CHF- acute [ x]   chronic [  ]    systolic [ x]   diatolic [ ]          - Echo- EF -   20%  Severe segmental LV systolic dysfunction          [ ] RV dysfunction     - Cxr-cardiomegally, edema          - Clinical-  [ ]inotropes   [ ]pressors  [ ]diuresis   [ ]IABP      [ ]LVAD   [x]Respiratory Failure.     Cardiogenic Shock - resolving    [ x] Hemodialysis  [ x] Hemofiltration:   today   [x ] negative fluid balance [ ] even fluid balance [ ] positive fluid balance, in a hemodynamically unstable patient.     Fluid  overload     Hypotension     DM- IVCD insulin/ NPH/ Admelog sliding scale    Renal Failure - Acute Kidney Injury     Requires bedside physical therapy, mobilization and total penitentiary care.     Tolerates NG / NJ feeds at [x] goal rate  [ ] trophic rate    [ ]       rate     fevers     UA c/w infection     Opthamology consult today               I, Finn Zelaya, personally performed the services described in this documentation. All medical record entries made by the scribe were at my direction and in my presence. I have reviewed the chart and agree that the record reflects my personal performance and is accurate and complete.   Finn Zelaya MD.       By signing my name below, I, Abhijit Ngo, attest that this documentation has been prepared under the direction and in the presence of Finn Zelaya MD.   Electronically Signed: Brian Slaughter 22 @ 06:46        Discussed with CT surgeon, Physician Assistant - Nurse Practitioner- Critical care medicine team.   Dicussed at  AM / PM rounds.   Chart, labs , films reviewed.    Cumulative Critical Care Time Given Today:  CRITICAL CARE ATTENDING - CTICU    MEDICATIONS  (STANDING):  aMIOdarone    Tablet 200 milliGRAM(s) Oral daily  aspirin  chewable 81 milliGRAM(s) Oral daily  atorvastatin 80 milliGRAM(s) Oral at bedtime  caspofungin IVPB 50 milliGRAM(s) IV Intermittent every 24 hours  caspofungin IVPB      chlorhexidine 0.12% Liquid 15 milliLiter(s) Oral Mucosa every 12 hours  chlorhexidine 2% Cloths 1 Application(s) Topical <User Schedule>  dextrose 5%. 1000 milliLiter(s) (50 mL/Hr) IV Continuous <Continuous>  dextrose 5%. 1000 milliLiter(s) (100 mL/Hr) IV Continuous <Continuous>  dextrose 50% Injectable 50 milliLiter(s) IV Push every 15 minutes  glucagon  Injectable 1 milliGRAM(s) IntraMuscular once  heparin   Injectable 5000 Unit(s) SubCutaneous every 8 hours  insulin lispro (ADMELOG) corrective regimen sliding scale   SubCutaneous every 4 hours  insulin NPH human recombinant 7 Unit(s) SubCutaneous every 6 hours  insulin regular Infusion 3 Unit(s)/Hr (3 mL/Hr) IV Continuous <Continuous>  meropenem  IVPB 500 milliGRAM(s) IV Intermittent <User Schedule>  multivitamin/minerals/iron Oral Solution (CENTRUM) 15 milliLiter(s) Oral daily  pantoprazole  Injectable 40 milliGRAM(s) IV Push daily  senna 2 Tablet(s) Oral at bedtime  sevelamer carbonate Powder 800 milliGRAM(s) Enteral Tube two times a day  thiamine 100 milliGRAM(s) Enteral Tube daily                                    8.2    9.58  )-----------( 148      ( 30 Dec 2022 00:36 )             26.5       12-30    137  |  96  |  100<H>  ----------------------------<  201<H>  4.2   |  25  |  5.36<H>    Ca    8.8      30 Dec 2022 00:33  Phos  7.3     12-30  Mg     2.7     12-30    TPro  6.7  /  Alb  3.1<L>  /  TBili  0.8  /  DBili  x   /  AST  32  /  ALT  19  /  AlkPhos  82  12-30      PT/INR - ( 29 Dec 2022 00:35 )   PT: 12.2 sec;   INR: 1.06 ratio         PTT - ( 29 Dec 2022 00:35 )  PTT:31.2 sec    Mode: vent off      Daily     Daily Weight in k.1 (30 Dec 2022 00:00)       @ 07:  -   @ 07:00  --------------------------------------------------------  IN: 2649.5 mL / OUT: 3130 mL / NET: -480.5 mL     @ 07:01  -   @ 06:46  --------------------------------------------------------  IN: 1910 mL / OUT: 1775 mL / NET: 135 mL        Critically Ill patient  : [ ] preoperative , [x] Non Operative    Requires :  [x ] Arterial Line   [ ] Central Line  [ ] PA catheter  [ ] IABP [ ] ECMO  [ ] LVAD  [x ] Ventilator  [ ] pacemaker [x] Trach  [x] HD                      [x ] ABG's     [x ] Pulse Oxymetry Monitoring  Bedside evaluation , monitoring , treatment of hemodynamics , fluids , IVP/ IVCD meds.      Diagnosis:      - [facility] - arrest in [facility]     ECMO Decannulation      POD 12- IABP placed in cath lab / IABP dc'ed     POD - Mitral Clip x1, TRACH 7 Merari XLT prox with ENT    MI     Acute Pancreatitis     ARDS     Hemodynamic lability,  instability. Requires IVCD [ ] vasopressors [ ]  inotropes  [ ] vasodilator  [x]IVSS fluid  to maintain MAP, perfusion, C.I.     Post Arrest Shock state - resolving    Respiratory Failure - Tracheostomy     Requires chest PT, pulmonary toilet, ambu bagging, suctioning to maintain SaO2,  patent airway and treat atelectasis.     Ventilator Management:  [x ]AC-rest    [ x]CPAP-PS Wean    [x ]Trach Collar     [ ]Extubate    [ ] T-Piece  [ ]peep>5     Difficult weaning process - multiple organ system involvement in critically ill patient     CHF- acute [ x]   chronic [  ]    systolic [ x]   diatolic [ ]          - Echo- EF -   20%  Severe segmental LV systolic dysfunction          [ ] RV dysfunction     - Cxr-cardiomegally, edema          - Clinical-  [ ]inotropes   [ ]pressors  [ ]diuresis   [ ]IABP      [ ]LVAD   [x]Respiratory Failure.  [x] HD    Cardiogenic Shock - resolving    [ x] Hemodialysis - today  [ x] Hemofiltration:  yesterday   [x ] negative fluid balance [ ] even fluid balance [ ] positive fluid balance, in a hemodynamically unstable patient.     Fluid  overload     Hypotension     DM- IVCD insulin/ NPH/ Admelog sliding scale    Renal Failure - Acute Kidney Injury     Requires bedside physical therapy, mobilization and total group home care.     Tolerates NG / NJ feeds at [x] goal rate  [ ] trophic rate    [ ]       rate     fevers     UA c/w infection     Opthamology consult today               I, Finn Zelaya, personally performed the services described in this documentation. All medical record entries made by the scribe were at my direction and in my presence. I have reviewed the chart and agree that the record reflects my personal performance and is accurate and complete.   Finn Zelaya MD.       By signing my name below, I, Abhijit Ngo, attest that this documentation has been prepared under the direction and in the presence of Finn Zelaya MD.   Electronically Signed: Brian Slaughter 22 @ 06:46        Discussed with CT surgeon, Physician Assistant - Nurse Practitioner- Critical care medicine team.   Dicussed at  AM / PM rounds.   Chart, labs , films reviewed.    Cumulative Critical Care Time Given Today:  40 min CRITICAL CARE ATTENDING - CTICU    MEDICATIONS  (STANDING):  aMIOdarone    Tablet 200 milliGRAM(s) Oral daily  aspirin  chewable 81 milliGRAM(s) Oral daily  atorvastatin 80 milliGRAM(s) Oral at bedtime  caspofungin IVPB 50 milliGRAM(s) IV Intermittent every 24 hours  caspofungin IVPB      chlorhexidine 0.12% Liquid 15 milliLiter(s) Oral Mucosa every 12 hours  chlorhexidine 2% Cloths 1 Application(s) Topical <User Schedule>  dextrose 5%. 1000 milliLiter(s) (50 mL/Hr) IV Continuous <Continuous>  dextrose 5%. 1000 milliLiter(s) (100 mL/Hr) IV Continuous <Continuous>  dextrose 50% Injectable 50 milliLiter(s) IV Push every 15 minutes  glucagon  Injectable 1 milliGRAM(s) IntraMuscular once  heparin   Injectable 5000 Unit(s) SubCutaneous every 8 hours  insulin lispro (ADMELOG) corrective regimen sliding scale   SubCutaneous every 4 hours  insulin NPH human recombinant 7 Unit(s) SubCutaneous every 6 hours  insulin regular Infusion 3 Unit(s)/Hr (3 mL/Hr) IV Continuous <Continuous>  meropenem  IVPB 500 milliGRAM(s) IV Intermittent <User Schedule>  multivitamin/minerals/iron Oral Solution (CENTRUM) 15 milliLiter(s) Oral daily  pantoprazole  Injectable 40 milliGRAM(s) IV Push daily  senna 2 Tablet(s) Oral at bedtime  sevelamer carbonate Powder 800 milliGRAM(s) Enteral Tube two times a day  thiamine 100 milliGRAM(s) Enteral Tube daily                                    8.2    9.58  )-----------( 148      ( 30 Dec 2022 00:36 )             26.5       12-30    137  |  96  |  100<H>  ----------------------------<  201<H>  4.2   |  25  |  5.36<H>    Ca    8.8      30 Dec 2022 00:33  Phos  7.3     12-30  Mg     2.7     12-30    TPro  6.7  /  Alb  3.1<L>  /  TBili  0.8  /  DBili  x   /  AST  32  /  ALT  19  /  AlkPhos  82  12-30      PT/INR - ( 29 Dec 2022 00:35 )   PT: 12.2 sec;   INR: 1.06 ratio         PTT - ( 29 Dec 2022 00:35 )  PTT:31.2 sec    Mode: vent off      Daily     Daily Weight in k.1 (30 Dec 2022 00:00)       @ 07:  -   @ 07:00  --------------------------------------------------------  IN: 2649.5 mL / OUT: 3130 mL / NET: -480.5 mL     @ 07:01  -   @ 06:46  --------------------------------------------------------  IN: 1910 mL / OUT: 1775 mL / NET: 135 mL        Critically Ill patient  : [ ] preoperative , [x] Non Operative    Requires :  [x ] Arterial Line   [ ] Central Line  [ ] PA catheter  [ ] IABP [ ] ECMO  [ ] LVAD  [x ] Ventilator  [ ] pacemaker [x] Trach  [x] HD                      [x ] ABG's     [x ] Pulse Oxymetry Monitoring  Bedside evaluation , monitoring , treatment of hemodynamics , fluids , IVP/ IVCD meds.      Diagnosis:      - [facility] - arrest in [facility]     ECMO Decannulation      POD 12- IABP placed in cath lab / IABP dc'ed     POD - Mitral Clip x1, TRACH 7 Merari XLT prox with ENT    MI     Acute Pancreatitis     ARDS     Hemodynamic lability,  instability. Requires IVCD [ ] vasopressors [ ]  inotropes  [ ] vasodilator  [x]IVSS fluid  to maintain MAP, perfusion, C.I.     Post Arrest Shock state - resolving    Respiratory Failure - Tracheostomy     Requires chest PT, pulmonary toilet, ambu bagging, suctioning to maintain SaO2,  patent airway and treat atelectasis.     Ventilator Management:  [x ]AC-rest    [ x]CPAP-PS Wean    [x ]Trach Collar     [ ]Extubate    [ ] T-Piece  [ ]peep>5     Difficult weaning process - multiple organ system involvement in critically ill patient     CHF- acute [ x]   chronic [  ]    systolic [ x]   diatolic [ ]          - Echo- EF -   20%  Severe segmental LV systolic dysfunction          [ ] RV dysfunction     - Cxr-cardiomegally, edema          - Clinical-  [ ]inotropes   [ ]pressors  [ ]diuresis   [ ]IABP      [ ]LVAD   [x]Respiratory Failure.  [x] HD    Cardiogenic Shock - resolving    [ x] Hemodialysis - today  [ x] Hemofiltration:  yesterday   [x ] negative fluid balance [ ] even fluid balance [ ] positive fluid balance, in a hemodynamically unstable patient.     Fluid  overload     Hypotension     DM- IVCD insulin/ NPH/ Admelog sliding scale    Renal Failure - Acute Kidney Injury     Requires bedside physical therapy, mobilization and total penitentiary care.     Tolerates NG / [place] feeds at [x] goal rate  [ ] trophic rate    [ ]       rate     fevers     UA c/w infection     Opthamology consult today               I, Finn Zelaya, personally performed the services described in this documentation. All medical record entries made by the scribe were at my direction and in my presence. I have reviewed the chart and agree that the record reflects my personal performance and is accurate and complete.   Finn Zelaya MD.       By signing my name below, I, Abhijit Ngo, attest that this documentation has been prepared under the direction and in the presence of Finn Zelaya MD.   Electronically Signed: Brian Slaughter 22 @ 06:46        Discussed with CT surgeon, Physician Assistant - Nurse Practitioner- Critical care medicine team.   Dicussed at  AM / PM rounds.   Chart, labs , films reviewed.    Cumulative Critical Care Time Given Today:  40 min CRITICAL CARE ATTENDING - CTICU    MEDICATIONS  (STANDING):  aMIOdarone    Tablet 200 milliGRAM(s) Oral daily  aspirin  chewable 81 milliGRAM(s) Oral daily  atorvastatin 80 milliGRAM(s) Oral at bedtime  caspofungin IVPB 50 milliGRAM(s) IV Intermittent every 24 hours  caspofungin IVPB      chlorhexidine 0.12% Liquid 15 milliLiter(s) Oral Mucosa every 12 hours  chlorhexidine 2% Cloths 1 Application(s) Topical <User Schedule>  dextrose 5%. 1000 milliLiter(s) (50 mL/Hr) IV Continuous <Continuous>  dextrose 5%. 1000 milliLiter(s) (100 mL/Hr) IV Continuous <Continuous>  dextrose 50% Injectable 50 milliLiter(s) IV Push every 15 minutes  glucagon  Injectable 1 milliGRAM(s) IntraMuscular once  heparin   Injectable 5000 Unit(s) SubCutaneous every 8 hours  insulin lispro (ADMELOG) corrective regimen sliding scale   SubCutaneous every 4 hours  insulin NPH human recombinant 7 Unit(s) SubCutaneous every 6 hours  insulin regular Infusion 3 Unit(s)/Hr (3 mL/Hr) IV Continuous <Continuous>  meropenem  IVPB 500 milliGRAM(s) IV Intermittent <User Schedule>  multivitamin/minerals/iron Oral Solution (CENTRUM) 15 milliLiter(s) Oral daily  pantoprazole  Injectable 40 milliGRAM(s) IV Push daily  senna 2 Tablet(s) Oral at bedtime  sevelamer carbonate Powder 800 milliGRAM(s) Enteral Tube two times a day  thiamine 100 milliGRAM(s) Enteral Tube daily                                    8.2    9.58  )-----------( 148      ( 30 Dec 2022 00:36 )             26.5       12-30    137  |  96  |  100<H>  ----------------------------<  201<H>  4.2   |  25  |  5.36<H>    Ca    8.8      30 Dec 2022 00:33  Phos  7.3     12-30  Mg     2.7     12-30    TPro  6.7  /  Alb  3.1<L>  /  TBili  0.8  /  DBili  x   /  AST  32  /  ALT  19  /  AlkPhos  82  12-30      PT/INR - ( 29 Dec 2022 00:35 )   PT: 12.2 sec;   INR: 1.06 ratio         PTT - ( 29 Dec 2022 00:35 )  PTT:31.2 sec    Mode: vent off      Daily     Daily Weight in k.1 (30 Dec 2022 00:00)       @ 07:  -   @ 07:00  --------------------------------------------------------  IN: 2649.5 mL / OUT: 3130 mL / NET: -480.5 mL     @ 07:01  -   @ 06:46  --------------------------------------------------------  IN: 1910 mL / OUT: 1775 mL / NET: 135 mL        Critically Ill patient  : [ ] preoperative , [x] Non Operative    Requires :  [x ] Arterial Line   [ ] Central Line  [ ] PA catheter  [ ] IABP [ ] ECMO  [ ] LVAD  [x ] Ventilator  [ ] pacemaker [x] Trach  [x] HD                      [x ] ABG's     [x ] Pulse Oxymetry Monitoring  Bedside evaluation , monitoring , treatment of hemodynamics , fluids , IVP/ IVCD meds.      Diagnosis:      - [facility] ECMO - arrest in SICU     ECMO Decannulation      POD 12- IABP placed in cath lab / IABP dc'ed     POD - Mitral Clip x1, TRACH 7 Merari XLT prox with ENT    MI     Acute Pancreatitis     ARDS     Hemodynamic lability,  instability. Requires IVCD [ ] vasopressors [ ]  inotropes  [ ] vasodilator  [x]IVSS fluid  to maintain MAP, perfusion, C.I.     Post Arrest Shock state - resolving    Respiratory Failure - Tracheostomy     Requires chest PT, pulmonary toilet, ambu bagging, suctioning to maintain SaO2,  patent airway and treat atelectasis.     Ventilator Management:  [x ]AC-rest    [ x]CPAP-PS Wean    [x ]Trach Collar     [ ]Extubate    [ ] T-Piece  [ ]peep>5     Difficult weaning process - multiple organ system involvement in critically ill patient     CHF- acute [ x]   chronic [  ]    systolic [ x]   diatolic [ ]          - Echo- EF -   20%  Severe segmental LV systolic dysfunction          [ ] RV dysfunction     - Cxr-cardiomegally, edema          - Clinical-  [ ]inotropes   [ ]pressors  [ ]diuresis   [ ]IABP      [ ]LVAD   [x]Respiratory Failure.  [x] HD    Cardiogenic Shock - resolving    [ x] Hemodialysis - today  [ x] Hemofiltration:  yesterday   [x ] negative fluid balance [ ] even fluid balance [ ] positive fluid balance, in a hemodynamically unstable patient.     Fluid  overload     Hypotension     DM- IVCD insulin/ NPH/ Admelog sliding scale    Renal Failure - Acute Kidney Injury     Requires bedside physical therapy, mobilization and total snf care.     Tolerates NG / NJ feeds at [x] goal rate  [ ] trophic rate    [ ]       rate     fevers     UA c/w infection     Opthamology consult today               I, Finn Zelaya, personally performed the services described in this documentation. All medical record entries made by the scribe were at my direction and in my presence. I have reviewed the chart and agree that the record reflects my personal performance and is accurate and complete.   Finn Zelaya MD.       By signing my name below, I, Abhijit Ngo, attest that this documentation has been prepared under the direction and in the presence of Finn Zelaya MD.   Electronically Signed: Brian Slaughter 22 @ 06:46        Discussed with CT surgeon, Physician Assistant - Nurse Practitioner- Critical care medicine team.   Dicussed at  AM / PM rounds.   Chart, labs , films reviewed.    Cumulative Critical Care Time Given Today:  40 min

## 2022-12-30 NOTE — CONSULT NOTE ADULT - ASSESSMENT
62M CAD s/p CABG, DM, HTN, presented as STEMI, found to have pancreatitis not necrotizing and cholecystitis on CT AP, admitted to to SICU for respiratory failure due to ARDS. Moved to the CTU for ECMO for cardiogenic shock vs ARDS, ECMO decannulated on 12/8 with IABP placement and mitral clip and removed on 12/17, s/p trac on 12/16/22, now on trach collar. Ophthalmology consulted in the setting of candidemia.     #Candidemia  -Pt Bcx grew and PCR recognized candida tropicalis from 12/26, since bcx negative  -Retinal exam without any signs of intraocular infection or inflammation  -VA slightly reduced OU, likely 2/2 cataract; rest of optic nerve function grossly full  -Please notify ophthalmology for any worsening of vision or floaters.     #Glaucoma suspect s/t CDR asymmetry  -IOP wnl OU  -No Fhx  -Outpatient follow up    Seen with Dr. Charlton    Outpatient follow-up: Patient should follow-up with his/her ophthalmologist or with [facility] Department of Ophthalmology upon discharge at the address below     [facility] Department of Ophthalmology  [address] [place] [address]. [address]  [place] [address]  [phone]  62M CAD s/p CABG, DM, HTN, presented as STEMI, found to have pancreatitis not necrotizing and cholecystitis on CT AP, admitted to to SICU for respiratory failure due to ARDS. Moved to the CTU for ECMO for cardiogenic shock vs ARDS, ECMO decannulated on 12/8 with IABP placement and mitral clip and removed on 12/17, s/p trac on 12/16/22, now on trach collar. Ophthalmology consulted in the setting of candidemia.     #Candidemia  -Pt Bcx grew and PCR recognized candida tropicalis from 12/26, since bcx negative  -Retinal exam without any signs of intraocular infection or inflammation  -VA slightly reduced OU, likely 2/2 cataract; rest of optic nerve function grossly full  -Please notify ophthalmology for any worsening of vision or floaters.     #Glaucoma suspect s/t CDR asymmetry  -IOP wnl OU  -No Fhx  -Outpatient follow up    Seen with Dr. Charlton    Outpatient follow-up: Patient should follow-up with his/her ophthalmologist or with [facility] Department of Ophthalmology upon discharge at the address below     [facility] Department of Ophthalmology  [address] [place] [address]. [address]  [place] [address]  [phone]  62M CAD s/p CABG, DM, HTN, presented as STEMI, found to have pancreatitis not necrotizing and cholecystitis on CT AP, admitted to to SICU for respiratory failure due to ARDS. Moved to the CTU for ECMO for cardiogenic shock vs ARDS, ECMO decannulated on 12/8 with IABP placement and mitral clip and removed on 12/17, s/p trac on 12/16/22, now on trach collar. Ophthalmology consulted in the setting of candidemia.     #Candidemia  -Pt Bcx grew and PCR recognized candida tropicalis from 12/26, since bcx negative  -Retinal exam without any signs of intraocular infection or inflammation  -VA slightly reduced OU, likely 2/2 cataract; rest of optic nerve function grossly full  -Please notify ophthalmology for any worsening of vision or floaters.     #Glaucoma suspect s/t CDR asymmetry  -IOP wnl OU  -No Fhx  -Outpatient follow up    Seen with Dr. Charlton    Outpatient follow-up: Patient should follow-up with his/her ophthalmologist or with [facility] Department of Ophthalmology upon discharge at the address below     [facility] Department of Ophthalmology  [address] [place] [address]. [address]  [place] [address]  [phone]

## 2022-12-30 NOTE — PROGRESS NOTE ADULT - SUBJECTIVE AND OBJECTIVE BOX
Follow Up:  candedemia    Interval History/ROS:  Overnight: Remains afebrile.  Otherwise hemodynamically stable.  Latest labs show no leukocytosis minimal thrombocytopenia 148.  CMP with expected elevated creatinine at 5.3 in setting of ESRD.  Hepatic function within normal limits.  Repeat blood cultures from 12/27/2022 remain negative to date, repeat blood cultures from 12/29/2022 remains negative.    Patient seen examined bedside.  Family at bedside.  Denies any new pain or discomfort.    Allergies  No Known Allergies    ANTIMICROBIALS:  caspofungin IVPB 50 every 24 hours  caspofungin IVPB    meropenem  IVPB 500 <User Schedule>      OTHER MEDS:  MEDICATIONS  (STANDING):  acetaminophen    Suspension .. 650 every 6 hours PRN  aMIOdarone    Tablet 200 daily  aspirin  chewable 81 daily  atorvastatin 80 at bedtime  dextrose 50% Injectable 50 every 15 minutes  dextrose Oral Gel 15 once PRN  glucagon  Injectable 1 once  heparin   Injectable 5000 every 8 hours  insulin lispro (ADMELOG) corrective regimen sliding scale  every 4 hours  insulin NPH human recombinant 7 every 6 hours  insulin regular Infusion 3 <Continuous>  pantoprazole  Injectable 40 daily  senna 2 at bedtime    Vital Signs Last 24 Hrs  T(C): 35.9 (30 Dec 2022 08:00), Max: 37.5 (29 Dec 2022 20:00)  T(F): 96.6 (30 Dec 2022 08:00), Max: 99.5 (29 Dec 2022 20:00)  HR: 101 (30 Dec 2022 11:00) (91 - 104)  BP: --  BP(mean): --  RR: 14 (30 Dec 2022 11:00) (5 - 28)  SpO2: 97% (30 Dec 2022 11:00) (96% - 100%)    Parameters below as of 30 Dec 2022 08:26  Patient On (Oxygen Delivery Method): tracheostomy collar  O2 Flow (L/min): 10  O2 Concentration (%): 50    PHYSICAL EXAM:  Ear/Nose/Throat: no oral lesion, no sinus tenderness on percussion	  Neck:no JVD, no lymphadenopathy, supple  Respiratory: CTA lore  Cardiovascular: S1S2 RRR, no murmurs  Gastrointestinal:soft, (+) BS, no HSM  Extremities:no e/e/c                        8.2    9.58  )-----------( 148      ( 30 Dec 2022 00:36 )             26.5       12-30    137  |  96  |  100<H>  ----------------------------<  201<H>  4.2   |  25  |  5.36<H>    Ca    8.8      30 Dec 2022 00:33  Phos  7.3     12-30  Mg     2.7     12-30    TPro  6.7  /  Alb  3.1<L>  /  TBili  0.8  /  DBili  x   /  AST  32  /  ALT  19  /  AlkPhos  82  12-30          MICROBIOLOGY:  v    Culture - Blood (collected 29 Dec 2022 00:56)  Source: .Blood Blood-Peripheral  Preliminary Report (30 Dec 2022 03:02):    No growth to date.    Culture - Blood (collected 27 Dec 2022 19:30)  Source: .Blood Blood-Peripheral  Preliminary Report (29 Dec 2022 02:02):    No growth to date.    Culture - Blood (collected 27 Dec 2022 16:44)  Source: .Blood Blood-Peripheral  Preliminary Report (29 Dec 2022 02:02):    No growth to date.    Culture - Sputum (collected 26 Dec 2022 11:54)  Source: Trach Asp Tracheal Aspirate  Gram Stain (26 Dec 2022 21:52):    No polymorphonuclear leukocytes per low power field    Few Squamous epithelial cells per low power field    Moderate Gram Positive Rods per oil power field  Final Report (28 Dec 2022 16:24):    Normal Respiratory Christy present    Culture - Blood (collected 26 Dec 2022 10:43)  Source: .Blood Blood  Preliminary Report (27 Dec 2022 17:01):    No growth to date.    Culture - Blood (collected 26 Dec 2022 06:38)  Source: .Blood Blood-Peripheral  Preliminary Report (27 Dec 2022 10:02):    No growth to date.    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    ************************************************************    This PCR assay was performed by multiplex PCR. This    Assay tests for 66 bacterial and resistance gene targets.    Please refer to the [facility] test directory    at [email or web address] for details.  Organism: Blood Culture PCR (27 Dec 2022 19:42)  Organism: Blood Culture PCR (27 Dec 2022 19:42)      -  Candida tropicalis: Detec      Method Type: PCR     Follow Up:  candedemia    Interval History/ROS:  Overnight: Remains afebrile.  Otherwise hemodynamically stable.  Latest labs show no leukocytosis minimal thrombocytopenia 148.  CMP with expected elevated creatinine at 5.3 in setting of ESRD.  Hepatic function within normal limits.  Repeat blood cultures from 12/27/2022 remain negative to date, repeat blood cultures from 12/29/2022 remains negative.    Patient seen examined bedside.  Family at bedside.  Denies any new pain or discomfort.    Allergies  No Known Allergies    ANTIMICROBIALS:  caspofungin IVPB 50 every 24 hours  caspofungin IVPB    meropenem  IVPB 500 <User Schedule>      OTHER MEDS:  MEDICATIONS  (STANDING):  acetaminophen    Suspension .. 650 every 6 hours PRN  aMIOdarone    Tablet 200 daily  aspirin  chewable 81 daily  atorvastatin 80 at bedtime  dextrose 50% Injectable 50 every 15 minutes  dextrose Oral Gel 15 once PRN  glucagon  Injectable 1 once  heparin   Injectable 5000 every 8 hours  insulin lispro (ADMELOG) corrective regimen sliding scale  every 4 hours  insulin NPH human recombinant 7 every 6 hours  insulin regular Infusion 3 <Continuous>  pantoprazole  Injectable 40 daily  senna 2 at bedtime    Vital Signs Last 24 Hrs  T(C): 35.9 (30 Dec 2022 08:00), Max: 37.5 (29 Dec 2022 20:00)  T(F): 96.6 (30 Dec 2022 08:00), Max: 99.5 (29 Dec 2022 20:00)  HR: 101 (30 Dec 2022 11:00) (91 - 104)  BP: --  BP(mean): --  RR: 14 (30 Dec 2022 11:00) (5 - 28)  SpO2: 97% (30 Dec 2022 11:00) (96% - 100%)    Parameters below as of 30 Dec 2022 08:26  Patient On (Oxygen Delivery Method): tracheostomy collar  O2 Flow (L/min): 10  O2 Concentration (%): 50    PHYSICAL EXAM:  Ear/Nose/Throat: no oral lesion, no sinus tenderness on percussion	  Neck:no JVD, no lymphadenopathy, supple  Respiratory: CTA lore  Cardiovascular: S1S2 RRR, no murmurs  Gastrointestinal:soft, (+) BS, no HSM  Extremities:no e/e/c                        8.2    9.58  )-----------( 148      ( 30 Dec 2022 00:36 )             26.5       12-30    137  |  96  |  100<H>  ----------------------------<  201<H>  4.2   |  25  |  5.36<H>    Ca    8.8      30 Dec 2022 00:33  Phos  7.3     12-30  Mg     2.7     12-30    TPro  6.7  /  Alb  3.1<L>  /  TBili  0.8  /  DBili  x   /  AST  32  /  ALT  19  /  AlkPhos  82  12-30          MICROBIOLOGY:  v    Culture - Blood (collected 29 Dec 2022 00:56)  Source: .Blood Blood-Peripheral  Preliminary Report (30 Dec 2022 03:02):    No growth to date.    Culture - Blood (collected 27 Dec 2022 19:30)  Source: .Blood Blood-Peripheral  Preliminary Report (29 Dec 2022 02:02):    No growth to date.    Culture - Blood (collected 27 Dec 2022 16:44)  Source: .Blood Blood-Peripheral  Preliminary Report (29 Dec 2022 02:02):    No growth to date.    Culture - Sputum (collected 26 Dec 2022 11:54)  Source: Trach Asp Tracheal Aspirate  Gram Stain (26 Dec 2022 21:52):    No polymorphonuclear leukocytes per low power field    Few Squamous epithelial cells per low power field    Moderate Gram Positive Rods per oil power field  Final Report (28 Dec 2022 16:24):    Normal Respiratory Christy present    Culture - Blood (collected 26 Dec 2022 10:43)  Source: .Blood Blood  Preliminary Report (27 Dec 2022 17:01):    No growth to date.    Culture - Blood (collected 26 Dec 2022 06:38)  Source: .Blood Blood-Peripheral  Preliminary Report (27 Dec 2022 10:02):    No growth to date.    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    ************************************************************    This PCR assay was performed by multiplex PCR. This    Assay tests for 66 bacterial and resistance gene targets.    Please refer to the [facility] test directory    at [email or web address] for details.  Organism: Blood Culture PCR (27 Dec 2022 19:42)  Organism: Blood Culture PCR (27 Dec 2022 19:42)      -  Candida tropicalis: Detec      Method Type: PCR     Follow Up:  candedemia    Interval History/ROS:  Overnight: Remains afebrile.  Otherwise hemodynamically stable.  Latest labs show no leukocytosis minimal thrombocytopenia 148.  CMP with expected elevated creatinine at 5.3 in setting of ESRD.  Hepatic function within normal limits.  Repeat blood cultures from 12/27/2022 remain negative to date, repeat blood cultures from 12/29/2022 remains negative.    Patient seen examined bedside.  Family at bedside.  Denies any new pain or discomfort.    Allergies  No Known Allergies    ANTIMICROBIALS:  caspofungin IVPB 50 every 24 hours  caspofungin IVPB    meropenem  IVPB 500 <User Schedule>      OTHER MEDS:  MEDICATIONS  (STANDING):  acetaminophen    Suspension .. 650 every 6 hours PRN  aMIOdarone    Tablet 200 daily  aspirin  chewable 81 daily  atorvastatin 80 at bedtime  dextrose 50% Injectable 50 every 15 minutes  dextrose Oral Gel 15 once PRN  glucagon  Injectable 1 once  heparin   Injectable 5000 every 8 hours  insulin lispro (ADMELOG) corrective regimen sliding scale  every 4 hours  insulin NPH human recombinant 7 every 6 hours  insulin regular Infusion 3 <Continuous>  pantoprazole  Injectable 40 daily  senna 2 at bedtime    Vital Signs Last 24 Hrs  T(C): 35.9 (30 Dec 2022 08:00), Max: 37.5 (29 Dec 2022 20:00)  T(F): 96.6 (30 Dec 2022 08:00), Max: 99.5 (29 Dec 2022 20:00)  HR: 101 (30 Dec 2022 11:00) (91 - 104)  BP: --  BP(mean): --  RR: 14 (30 Dec 2022 11:00) (5 - 28)  SpO2: 97% (30 Dec 2022 11:00) (96% - 100%)    Parameters below as of 30 Dec 2022 08:26  Patient On (Oxygen Delivery Method): tracheostomy collar  O2 Flow (L/min): 10  O2 Concentration (%): 50    PHYSICAL EXAM:  Ear/Nose/Throat: no oral lesion, no sinus tenderness on percussion	  Neck:no JVD, no lymphadenopathy, supple  Respiratory: CTA lore  Cardiovascular: S1S2 RRR, no murmurs  Gastrointestinal:soft, (+) BS, no HSM  Extremities:no e/e/c                        8.2    9.58  )-----------( 148      ( 30 Dec 2022 00:36 )             26.5       12-30    137  |  96  |  100<H>  ----------------------------<  201<H>  4.2   |  25  |  5.36<H>    Ca    8.8      30 Dec 2022 00:33  Phos  7.3     12-30  Mg     2.7     12-30    TPro  6.7  /  Alb  3.1<L>  /  TBili  0.8  /  DBili  x   /  AST  32  /  ALT  19  /  AlkPhos  82  12-30          MICROBIOLOGY:  v    Culture - Blood (collected 29 Dec 2022 00:56)  Source: .Blood Blood-Peripheral  Preliminary Report (30 Dec 2022 03:02):    No growth to date.    Culture - Blood (collected 27 Dec 2022 19:30)  Source: .Blood Blood-Peripheral  Preliminary Report (29 Dec 2022 02:02):    No growth to date.    Culture - Blood (collected 27 Dec 2022 16:44)  Source: .Blood Blood-Peripheral  Preliminary Report (29 Dec 2022 02:02):    No growth to date.    Culture - Sputum (collected 26 Dec 2022 11:54)  Source: Trach Asp Tracheal Aspirate  Gram Stain (26 Dec 2022 21:52):    No polymorphonuclear leukocytes per low power field    Few Squamous epithelial cells per low power field    Moderate Gram Positive Rods per oil power field  Final Report (28 Dec 2022 16:24):    Normal Respiratory Christy present    Culture - Blood (collected 26 Dec 2022 10:43)  Source: .Blood Blood  Preliminary Report (27 Dec 2022 17:01):    No growth to date.    Culture - Blood (collected 26 Dec 2022 06:38)  Source: .Blood Blood-Peripheral  Preliminary Report (27 Dec 2022 10:02):    No growth to date.    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    ************************************************************    This PCR assay was performed by multiplex PCR. This    Assay tests for 66 bacterial and resistance gene targets.    Please refer to the [facility] test directory    at [email or web address] for details.  Organism: Blood Culture PCR (27 Dec 2022 19:42)  Organism: Blood Culture PCR (27 Dec 2022 19:42)      -  Candida tropicalis: Detec      Method Type: PCR

## 2022-12-30 NOTE — CONSULT NOTE ADULT - SUBJECTIVE AND OBJECTIVE BOX
HPI:  Patient is a 63 y/o male with PMH of CABG, DM, HTN, HLD presenting as transfer from [place] for c/f STEMI. PTA arrival received 325 aspirin, 600 plavix, and no heparin drip started. Around 1:30 am patient had multiple episodes of non-bloody diarrhea and emesis with associated abdominal pain and chest pain, unable to localize, non-radiating, with associated SOB and no associated palpitations or diaphoresis. No recent fevers/chills, cough, rashes, or changes in urination. Does report mild diffuse back pain; started 5 days ago in setting on injury while walking and lifting objects. Denies focal weakness, numbness/tingling, or LOC due does report mild dizziness.    Patient seen and examined in ED. Hemodynamically stable, alert and awake, A&Ox3. Daughter at bedside. Reports abdominal pain, and nausea. Abdomen is soft, tender in epigastric area and RUQ. Denies chest pain, SOB. WBC 20, T.bili 3.4, Lipase 300. RUQ US demonstrated gallbladder stones, pericholecystic fluid.  (24 Nov 2022 15:10)    Patient was admitted on 11/24, CT abdomen with acute pancreatitis, intubated, went into hypoxic PEA arrest, then cannulated to [facility] on 11/25, CTH with small subdural hemorrhage. ECMO decannulation 12/08, s/p trach, IABP removed 12/17, requiring dialysis, on intermittent HD. Patient was seen today in CTU, wife at bedside. Patient follows commands, points to stomach when asked if he has pain.     REVIEW OF SYSTEMS  Constitutional - No fever, No weight loss, No fatigue  HEENT - No eye pain, No visual disturbances, No difficulty hearing, No tinnitus, No vertigo, No neck pain  Respiratory - +Trach collar   Cardiovascular - No chest pain, No palpitations  Gastrointestinal - No abdominal pain, No nausea, No vomiting, No diarrhea, No constipation  Genitourinary - michel   Psychiatric - No depression, No anxiety    VITALS  T(C): 35.9 (12-30-22 @ 08:00), Max: 37.5 (12-29-22 @ 20:00)  HR: 101 (12-30-22 @ 11:00) (91 - 104)  BP: --  RR: 14 (12-30-22 @ 11:00) (5 - 28)  SpO2: 97% (12-30-22 @ 11:00) (96% - 100%)  Wt(kg): --    PAST MEDICAL & SURGICAL HISTORY  see HPI     SOCIAL HISTORY  Smoking - Denied  EtOH - Denied   Drugs - Denied    FUNCTIONAL HISTORY  Lives with wife, retired  Independent AMB and ADLs PTA     CURRENT FUNCTIONAL STATUS  12/30 PT  bed mobility mod assist  transfers min to mod assist  gait mod assist x 1 step        FAMILY HISTORY   no pertinent history in first degree relatives     RECENT LABS/IMAGING  CBC Full  -  ( 30 Dec 2022 00:36 )  WBC Count : 9.58 K/uL  RBC Count : 2.78 M/uL  Hemoglobin : 8.2 g/dL  Hematocrit : 26.5 %  Platelet Count - Automated : 148 K/uL  Mean Cell Volume : 95.3 fl  Mean Cell Hemoglobin : 29.5 pg  Mean Cell Hemoglobin Concentration : 30.9 gm/dL  Auto Neutrophil # : x  Auto Lymphocyte # : x  Auto Monocyte # : x  Auto Eosinophil # : x  Auto Basophil # : x  Auto Neutrophil % : x  Auto Lymphocyte % : x  Auto Monocyte % : x  Auto Eosinophil % : x  Auto Basophil % : x    12-30    137  |  96  |  100<H>  ----------------------------<  201<H>  4.2   |  25  |  5.36<H>    Ca    8.8      30 Dec 2022 00:33  Phos  7.3     12-30  Mg     2.7     12-30    TPro  6.7  /  Alb  3.1<L>  /  TBili  0.8  /  DBili  x   /  AST  32  /  ALT  19  /  AlkPhos  82  12-30    < from: CT Head No Cont (12.13.22 @ 09:51) >    IMPRESSION:    Similar minimal increased density along the right aspect of the posterior   falx and right tentorial leaf, consistent with minimal residual subdural   hemorrhage.    No parenchymal hemorrhage or brain edema.      < end of copied text >      ALLERGIES  No Known Allergies      MEDICATIONS   acetaminophen    Suspension .. 650 milliGRAM(s) Oral every 6 hours PRN  aMIOdarone    Tablet 200 milliGRAM(s) Oral daily  aspirin  chewable 81 milliGRAM(s) Oral daily  atorvastatin 80 milliGRAM(s) Oral at bedtime  caspofungin IVPB 50 milliGRAM(s) IV Intermittent every 24 hours  caspofungin IVPB      chlorhexidine 0.12% Liquid 15 milliLiter(s) Oral Mucosa every 12 hours  chlorhexidine 2% Cloths 1 Application(s) Topical <User Schedule>  dextrose 5%. 1000 milliLiter(s) IV Continuous <Continuous>  dextrose 5%. 1000 milliLiter(s) IV Continuous <Continuous>  dextrose 50% Injectable 50 milliLiter(s) IV Push every 15 minutes  dextrose Oral Gel 15 Gram(s) Oral once PRN  glucagon  Injectable 1 milliGRAM(s) IntraMuscular once  heparin   Injectable 5000 Unit(s) SubCutaneous every 8 hours  insulin lispro (ADMELOG) corrective regimen sliding scale   SubCutaneous every 4 hours  insulin NPH human recombinant 7 Unit(s) SubCutaneous every 6 hours  insulin regular Infusion 3 Unit(s)/Hr IV Continuous <Continuous>  meropenem  IVPB 500 milliGRAM(s) IV Intermittent <User Schedule>  multivitamin/minerals/iron Oral Solution (CENTRUM) 15 milliLiter(s) Oral daily  pantoprazole  Injectable 40 milliGRAM(s) IV Push daily  senna 2 Tablet(s) Oral at bedtime  sevelamer carbonate Powder 800 milliGRAM(s) Enteral Tube two times a day  sodium chloride 0.9% lock flush 10 milliLiter(s) IV Push every 1 hour PRN  thiamine 100 milliGRAM(s) Enteral Tube daily      ----------------------------------------------------------------------------------------  PHYSICAL EXAM  Constitutional - NAD, Comfortable, in chair   NGT  Chest - trach collar   Cardiovascular - S1S2   Abdomen - Soft   Extremities - No C/C/E, No calf tenderness   Neurologic Exam -                    Cognitive - Awake, Alert     Communication - follows commands         Motor -                     LEFT    UE - ShAB 4/5, EF 5/5, EE 5/5, WE 5/5,  5/5                    RIGHT UE - ShAB 4/5, EF 5/5, EE 5/5, WE 5/5,  5/5                    LEFT    LE - HF 3/5, KE 3/5, DF 4/5, PF 4/5                    RIGHT LE - HF 3/5, KE 3/5, DF 4/5, PF 4/5        Sensory - Intact to LT    Psychiatric - Mood stable, Affect WNL  ----------------------------------------------------------------------------------------  ASSESSMENT/PLAN  62yMale h/o CAD s/p CABG 2019 DM, HTN with functional deficits after cardiogenic shock, with debility  SDH on CT, stable   s/p mitral clip 12/16  gall stone pancreatitis, no plan for intervention   ERIC, on HD   candidemia, on caspofungin, meropenem   Pain - Tylenol  DVT PPX - SCDs  Rehab - Will continue to follow for ongoing rehab needs and recommendations.   continue bedside therapy, needs OT evaluation    Recommend ACUTE inpatient rehabilitation for the functional deficits consisting of 3 hours of therapy/day & 24 hour RN/daily PMR physician for comorbid medical management. Patient will be able to tolerate 3 hours a day.    HPI:  Patient is a 61 y/o male with PMH of CABG, DM, HTN, HLD presenting as transfer from [place] for c/f STEMI. PTA arrival received 325 aspirin, 600 plavix, and no heparin drip started. Around 1:30 am patient had multiple episodes of non-bloody diarrhea and emesis with associated abdominal pain and chest pain, unable to localize, non-radiating, with associated SOB and no associated palpitations or diaphoresis. No recent fevers/chills, cough, rashes, or changes in urination. Does report mild diffuse back pain; started 5 days ago in setting on injury while walking and lifting objects. Denies focal weakness, numbness/tingling, or LOC due does report mild dizziness.    Patient seen and examined in ED. Hemodynamically stable, alert and awake, A&Ox3. Daughter at bedside. Reports abdominal pain, and nausea. Abdomen is soft, tender in epigastric area and RUQ. Denies chest pain, SOB. WBC 20, T.bili 3.4, Lipase 300. RUQ US demonstrated gallbladder stones, pericholecystic fluid.  (24 Nov 2022 15:10)    Patient was admitted on 11/24, CT abdomen with acute pancreatitis, intubated, went into hypoxic PEA arrest, then cannulated to [facility] on 11/25, CTH with small subdural hemorrhage. ECMO decannulation 12/08, s/p trach, IABP removed 12/17, requiring dialysis, on intermittent HD. Patient was seen today in CTU, wife at bedside. Patient follows commands, points to stomach when asked if he has pain.     REVIEW OF SYSTEMS  Constitutional - No fever, No weight loss, No fatigue  HEENT - No eye pain, No visual disturbances, No difficulty hearing, No tinnitus, No vertigo, No neck pain  Respiratory - +Trach collar   Cardiovascular - No chest pain, No palpitations  Gastrointestinal - No abdominal pain, No nausea, No vomiting, No diarrhea, No constipation  Genitourinary - michel   Psychiatric - No depression, No anxiety    VITALS  T(C): 35.9 (12-30-22 @ 08:00), Max: 37.5 (12-29-22 @ 20:00)  HR: 101 (12-30-22 @ 11:00) (91 - 104)  BP: --  RR: 14 (12-30-22 @ 11:00) (5 - 28)  SpO2: 97% (12-30-22 @ 11:00) (96% - 100%)  Wt(kg): --    PAST MEDICAL & SURGICAL HISTORY  see HPI     SOCIAL HISTORY  Smoking - Denied  EtOH - Denied   Drugs - Denied    FUNCTIONAL HISTORY  Lives with wife, retired  Independent AMB and ADLs PTA     CURRENT FUNCTIONAL STATUS  12/30 PT  bed mobility mod assist  transfers min to mod assist  gait mod assist x 1 step        FAMILY HISTORY   no pertinent history in first degree relatives     RECENT LABS/IMAGING  CBC Full  -  ( 30 Dec 2022 00:36 )  WBC Count : 9.58 K/uL  RBC Count : 2.78 M/uL  Hemoglobin : 8.2 g/dL  Hematocrit : 26.5 %  Platelet Count - Automated : 148 K/uL  Mean Cell Volume : 95.3 fl  Mean Cell Hemoglobin : 29.5 pg  Mean Cell Hemoglobin Concentration : 30.9 gm/dL  Auto Neutrophil # : x  Auto Lymphocyte # : x  Auto Monocyte # : x  Auto Eosinophil # : x  Auto Basophil # : x  Auto Neutrophil % : x  Auto Lymphocyte % : x  Auto Monocyte % : x  Auto Eosinophil % : x  Auto Basophil % : x    12-30    137  |  96  |  100<H>  ----------------------------<  201<H>  4.2   |  25  |  5.36<H>    Ca    8.8      30 Dec 2022 00:33  Phos  7.3     12-30  Mg     2.7     12-30    TPro  6.7  /  Alb  3.1<L>  /  TBili  0.8  /  DBili  x   /  AST  32  /  ALT  19  /  AlkPhos  82  12-30    < from: CT Head No Cont (12.13.22 @ 09:51) >    IMPRESSION:    Similar minimal increased density along the right aspect of the posterior   falx and right tentorial leaf, consistent with minimal residual subdural   hemorrhage.    No parenchymal hemorrhage or brain edema.      < end of copied text >      ALLERGIES  No Known Allergies      MEDICATIONS   acetaminophen    Suspension .. 650 milliGRAM(s) Oral every 6 hours PRN  aMIOdarone    Tablet 200 milliGRAM(s) Oral daily  aspirin  chewable 81 milliGRAM(s) Oral daily  atorvastatin 80 milliGRAM(s) Oral at bedtime  caspofungin IVPB 50 milliGRAM(s) IV Intermittent every 24 hours  caspofungin IVPB      chlorhexidine 0.12% Liquid 15 milliLiter(s) Oral Mucosa every 12 hours  chlorhexidine 2% Cloths 1 Application(s) Topical <User Schedule>  dextrose 5%. 1000 milliLiter(s) IV Continuous <Continuous>  dextrose 5%. 1000 milliLiter(s) IV Continuous <Continuous>  dextrose 50% Injectable 50 milliLiter(s) IV Push every 15 minutes  dextrose Oral Gel 15 Gram(s) Oral once PRN  glucagon  Injectable 1 milliGRAM(s) IntraMuscular once  heparin   Injectable 5000 Unit(s) SubCutaneous every 8 hours  insulin lispro (ADMELOG) corrective regimen sliding scale   SubCutaneous every 4 hours  insulin NPH human recombinant 7 Unit(s) SubCutaneous every 6 hours  insulin regular Infusion 3 Unit(s)/Hr IV Continuous <Continuous>  meropenem  IVPB 500 milliGRAM(s) IV Intermittent <User Schedule>  multivitamin/minerals/iron Oral Solution (CENTRUM) 15 milliLiter(s) Oral daily  pantoprazole  Injectable 40 milliGRAM(s) IV Push daily  senna 2 Tablet(s) Oral at bedtime  sevelamer carbonate Powder 800 milliGRAM(s) Enteral Tube two times a day  sodium chloride 0.9% lock flush 10 milliLiter(s) IV Push every 1 hour PRN  thiamine 100 milliGRAM(s) Enteral Tube daily      ----------------------------------------------------------------------------------------  PHYSICAL EXAM  Constitutional - NAD, Comfortable, in chair   NGT  Chest - trach collar   Cardiovascular - S1S2   Abdomen - Soft   Extremities - No C/C/E, No calf tenderness   Neurologic Exam -                    Cognitive - Awake, Alert     Communication - follows commands         Motor -                     LEFT    UE - ShAB 4/5, EF 5/5, EE 5/5, WE 5/5,  5/5                    RIGHT UE - ShAB 4/5, EF 5/5, EE 5/5, WE 5/5,  5/5                    LEFT    LE - HF 3/5, KE 3/5, DF 4/5, PF 4/5                    RIGHT LE - HF 3/5, KE 3/5, DF 4/5, PF 4/5        Sensory - Intact to LT    Psychiatric - Mood stable, Affect WNL  ----------------------------------------------------------------------------------------  ASSESSMENT/PLAN  62yMale h/o CAD s/p CABG 2019 DM, HTN with functional deficits after cardiogenic shock, with debility  SDH on CT, stable   s/p mitral clip 12/16  gall stone pancreatitis, no plan for intervention   ERIC, on HD   candidemia, on caspofungin, meropenem   Pain - Tylenol  DVT PPX - SCDs  Rehab - Will continue to follow for ongoing rehab needs and recommendations.   continue bedside therapy, needs OT evaluation    Recommend ACUTE inpatient rehabilitation for the functional deficits consisting of 3 hours of therapy/day & 24 hour RN/daily PMR physician for comorbid medical management. Patient will be able to tolerate 3 hours a day.    HPI:  Patient is a 63 y/o male with PMH of CABG, DM, HTN, HLD presenting as transfer from [place] for c/f STEMI. PTA arrival received 325 aspirin, 600 plavix, and no heparin drip started. Around 1:30 am patient had multiple episodes of non-bloody diarrhea and emesis with associated abdominal pain and chest pain, unable to localize, non-radiating, with associated SOB and no associated palpitations or diaphoresis. No recent fevers/chills, cough, rashes, or changes in urination. Does report mild diffuse back pain; started 5 days ago in setting on injury while walking and lifting objects. Denies focal weakness, numbness/tingling, or LOC due does report mild dizziness.    Patient seen and examined in ED. Hemodynamically stable, alert and awake, A&Ox3. Daughter at bedside. Reports abdominal pain, and nausea. Abdomen is soft, tender in epigastric area and RUQ. Denies chest pain, SOB. WBC 20, T.bili 3.4, Lipase 300. RUQ US demonstrated gallbladder stones, pericholecystic fluid.  (24 Nov 2022 15:10)    Patient was admitted on 11/24, CT abdomen with acute pancreatitis, intubated, went into hypoxic PEA arrest, then cannulated to [facility] ECMO on 11/25, CTH with small subdural hemorrhage. ECMO decannulation 12/08, s/p trach, IABP removed 12/17, requiring dialysis, on intermittent HD. Patient was seen today in CTU, wife at bedside. Patient follows commands, points to stomach when asked if he has pain.     REVIEW OF SYSTEMS  Constitutional - No fever, No weight loss, No fatigue  HEENT - No eye pain, No visual disturbances, No difficulty hearing, No tinnitus, No vertigo, No neck pain  Respiratory - +Trach collar   Cardiovascular - No chest pain, No palpitations  Gastrointestinal - No abdominal pain, No nausea, No vomiting, No diarrhea, No constipation  Genitourinary - michel   Psychiatric - No depression, No anxiety    VITALS  T(C): 35.9 (12-30-22 @ 08:00), Max: 37.5 (12-29-22 @ 20:00)  HR: 101 (12-30-22 @ 11:00) (91 - 104)  BP: --  RR: 14 (12-30-22 @ 11:00) (5 - 28)  SpO2: 97% (12-30-22 @ 11:00) (96% - 100%)  Wt(kg): --    PAST MEDICAL & SURGICAL HISTORY  see HPI     SOCIAL HISTORY  Smoking - Denied  EtOH - Denied   Drugs - Denied    FUNCTIONAL HISTORY  Lives with wife, retired  Independent AMB and ADLs PTA     CURRENT FUNCTIONAL STATUS  12/30 PT  bed mobility mod assist  transfers min to mod assist  gait mod assist x 1 step        FAMILY HISTORY   no pertinent history in first degree relatives     RECENT LABS/IMAGING  CBC Full  -  ( 30 Dec 2022 00:36 )  WBC Count : 9.58 K/uL  RBC Count : 2.78 M/uL  Hemoglobin : 8.2 g/dL  Hematocrit : 26.5 %  Platelet Count - Automated : 148 K/uL  Mean Cell Volume : 95.3 fl  Mean Cell Hemoglobin : 29.5 pg  Mean Cell Hemoglobin Concentration : 30.9 gm/dL  Auto Neutrophil # : x  Auto Lymphocyte # : x  Auto Monocyte # : x  Auto Eosinophil # : x  Auto Basophil # : x  Auto Neutrophil % : x  Auto Lymphocyte % : x  Auto Monocyte % : x  Auto Eosinophil % : x  Auto Basophil % : x    12-30    137  |  96  |  100<H>  ----------------------------<  201<H>  4.2   |  25  |  5.36<H>    Ca    8.8      30 Dec 2022 00:33  Phos  7.3     12-30  Mg     2.7     12-30    TPro  6.7  /  Alb  3.1<L>  /  TBili  0.8  /  DBili  x   /  AST  32  /  ALT  19  /  AlkPhos  82  12-30    < from: CT Head No Cont (12.13.22 @ 09:51) >    IMPRESSION:    Similar minimal increased density along the right aspect of the posterior   falx and right tentorial leaf, consistent with minimal residual subdural   hemorrhage.    No parenchymal hemorrhage or brain edema.      < end of copied text >      ALLERGIES  No Known Allergies      MEDICATIONS   acetaminophen    Suspension .. 650 milliGRAM(s) Oral every 6 hours PRN  aMIOdarone    Tablet 200 milliGRAM(s) Oral daily  aspirin  chewable 81 milliGRAM(s) Oral daily  atorvastatin 80 milliGRAM(s) Oral at bedtime  caspofungin IVPB 50 milliGRAM(s) IV Intermittent every 24 hours  caspofungin IVPB      chlorhexidine 0.12% Liquid 15 milliLiter(s) Oral Mucosa every 12 hours  chlorhexidine 2% Cloths 1 Application(s) Topical <User Schedule>  dextrose 5%. 1000 milliLiter(s) IV Continuous <Continuous>  dextrose 5%. 1000 milliLiter(s) IV Continuous <Continuous>  dextrose 50% Injectable 50 milliLiter(s) IV Push every 15 minutes  dextrose Oral Gel 15 Gram(s) Oral once PRN  glucagon  Injectable 1 milliGRAM(s) IntraMuscular once  heparin   Injectable 5000 Unit(s) SubCutaneous every 8 hours  insulin lispro (ADMELOG) corrective regimen sliding scale   SubCutaneous every 4 hours  insulin NPH human recombinant 7 Unit(s) SubCutaneous every 6 hours  insulin regular Infusion 3 Unit(s)/Hr IV Continuous <Continuous>  meropenem  IVPB 500 milliGRAM(s) IV Intermittent <User Schedule>  multivitamin/minerals/iron Oral Solution (CENTRUM) 15 milliLiter(s) Oral daily  pantoprazole  Injectable 40 milliGRAM(s) IV Push daily  senna 2 Tablet(s) Oral at bedtime  sevelamer carbonate Powder 800 milliGRAM(s) Enteral Tube two times a day  sodium chloride 0.9% lock flush 10 milliLiter(s) IV Push every 1 hour PRN  thiamine 100 milliGRAM(s) Enteral Tube daily      ----------------------------------------------------------------------------------------  PHYSICAL EXAM  Constitutional - NAD, Comfortable, in chair   NGT  Chest - trach collar   Cardiovascular - S1S2   Abdomen - Soft   Extremities - No C/C/E, No calf tenderness   Neurologic Exam -                    Cognitive - Awake, Alert     Communication - follows commands         Motor -                     LEFT    UE - ShAB 4/5, EF 5/5, EE 5/5, WE 5/5,  5/5                    RIGHT UE - ShAB 4/5, EF 5/5, EE 5/5, WE 5/5,  5/5                    LEFT    LE - HF 3/5, KE 3/5, DF 4/5, PF 4/5                    RIGHT LE - HF 3/5, KE 3/5, DF 4/5, PF 4/5        Sensory - Intact to LT    Psychiatric - Mood stable, Affect WNL  ----------------------------------------------------------------------------------------  ASSESSMENT/PLAN  62yMale h/o CAD s/p CABG 2019 DM, HTN with functional deficits after cardiogenic shock, with debility  SDH on CT, stable   s/p mitral clip 12/16  gall stone pancreatitis, no plan for intervention   ERIC, on HD   candidemia, on caspofungin, meropenem   Pain - Tylenol  DVT PPX - SCDs  Rehab - Will continue to follow for ongoing rehab needs and recommendations.   continue bedside therapy, needs OT evaluation    Recommend ACUTE inpatient rehabilitation for the functional deficits consisting of 3 hours of therapy/day & 24 hour RN/daily PMR physician for comorbid medical management. Patient will be able to tolerate 3 hours a day.

## 2022-12-31 LAB
-  AMPHOTERICIN B: SIGNIFICANT CHANGE UP
-  ANIDULAFUNGIN: SIGNIFICANT CHANGE UP
-  CASPOFUNGIN: SIGNIFICANT CHANGE UP
-  FLUCONAZOLE: SIGNIFICANT CHANGE UP
-  MICAFUNGIN: SIGNIFICANT CHANGE UP
-  POSACONAZOLE: SIGNIFICANT CHANGE UP
-  VORICONAZOLE: SIGNIFICANT CHANGE UP
ALBUMIN SERPL ELPH-MCNC: 2.9 G/DL — LOW (ref 3.3–5)
ALP SERPL-CCNC: 83 U/L — SIGNIFICANT CHANGE UP (ref 40–120)
ALT FLD-CCNC: 21 U/L — SIGNIFICANT CHANGE UP (ref 10–45)
ANION GAP SERPL CALC-SCNC: 14 MMOL/L — SIGNIFICANT CHANGE UP (ref 5–17)
AST SERPL-CCNC: 29 U/L — SIGNIFICANT CHANGE UP (ref 10–40)
BILIRUB SERPL-MCNC: 0.7 MG/DL — SIGNIFICANT CHANGE UP (ref 0.2–1.2)
BUN SERPL-MCNC: 77 MG/DL — HIGH (ref 7–23)
CALCIUM SERPL-MCNC: 8.3 MG/DL — LOW (ref 8.4–10.5)
CHLORIDE SERPL-SCNC: 98 MMOL/L — SIGNIFICANT CHANGE UP (ref 96–108)
CO2 SERPL-SCNC: 25 MMOL/L — SIGNIFICANT CHANGE UP (ref 22–31)
CREAT SERPL-MCNC: 4.2 MG/DL — HIGH (ref 0.5–1.3)
CULTURE RESULTS: SIGNIFICANT CHANGE UP
EGFR: 15 ML/MIN/1.73M2 — LOW
FERRITIN SERPL-MCNC: 346 NG/ML — SIGNIFICANT CHANGE UP (ref 30–400)
GAS PNL BLDA: SIGNIFICANT CHANGE UP
GLUCOSE BLDC GLUCOMTR-MCNC: 122 MG/DL — HIGH (ref 70–99)
GLUCOSE BLDC GLUCOMTR-MCNC: 145 MG/DL — HIGH (ref 70–99)
GLUCOSE BLDC GLUCOMTR-MCNC: 151 MG/DL — HIGH (ref 70–99)
GLUCOSE BLDC GLUCOMTR-MCNC: 161 MG/DL — HIGH (ref 70–99)
GLUCOSE BLDC GLUCOMTR-MCNC: 169 MG/DL — HIGH (ref 70–99)
GLUCOSE BLDC GLUCOMTR-MCNC: 184 MG/DL — HIGH (ref 70–99)
GLUCOSE BLDC GLUCOMTR-MCNC: 188 MG/DL — HIGH (ref 70–99)
GLUCOSE SERPL-MCNC: 152 MG/DL — HIGH (ref 70–99)
HCT VFR BLD CALC: 30 % — LOW (ref 39–50)
HGB BLD-MCNC: 9.6 G/DL — LOW (ref 13–17)
IRON SATN MFR SERPL: 18 % — SIGNIFICANT CHANGE UP (ref 16–55)
IRON SATN MFR SERPL: 39 UG/DL — LOW (ref 45–165)
MAGNESIUM SERPL-MCNC: 2.6 MG/DL — SIGNIFICANT CHANGE UP (ref 1.6–2.6)
MCHC RBC-ENTMCNC: 30 PG — SIGNIFICANT CHANGE UP (ref 27–34)
MCHC RBC-ENTMCNC: 32 GM/DL — SIGNIFICANT CHANGE UP (ref 32–36)
MCV RBC AUTO: 93.8 FL — SIGNIFICANT CHANGE UP (ref 80–100)
METHOD TYPE: SIGNIFICANT CHANGE UP
NRBC # BLD: 0 /100 WBCS — SIGNIFICANT CHANGE UP (ref 0–0)
ORGANISM # SPEC MICROSCOPIC CNT: SIGNIFICANT CHANGE UP
PHOSPHATE SERPL-MCNC: 5.5 MG/DL — HIGH (ref 2.5–4.5)
PLATELET # BLD AUTO: 120 K/UL — LOW (ref 150–400)
POTASSIUM SERPL-MCNC: 3.8 MMOL/L — SIGNIFICANT CHANGE UP (ref 3.5–5.3)
POTASSIUM SERPL-SCNC: 3.8 MMOL/L — SIGNIFICANT CHANGE UP (ref 3.5–5.3)
PROT SERPL-MCNC: 6.6 G/DL — SIGNIFICANT CHANGE UP (ref 6–8.3)
RBC # BLD: 3.2 M/UL — LOW (ref 4.2–5.8)
RBC # FLD: 17.9 % — HIGH (ref 10.3–14.5)
SODIUM SERPL-SCNC: 137 MMOL/L — SIGNIFICANT CHANGE UP (ref 135–145)
SPECIMEN SOURCE: SIGNIFICANT CHANGE UP
TIBC SERPL-MCNC: 222 UG/DL — SIGNIFICANT CHANGE UP (ref 220–430)
UIBC SERPL-MCNC: 183 UG/DL — SIGNIFICANT CHANGE UP (ref 110–370)
WBC # BLD: 10.48 K/UL — SIGNIFICANT CHANGE UP (ref 3.8–10.5)
WBC # FLD AUTO: 10.48 K/UL — SIGNIFICANT CHANGE UP (ref 3.8–10.5)

## 2022-12-31 PROCEDURE — 99291 CRITICAL CARE FIRST HOUR: CPT

## 2022-12-31 PROCEDURE — 71045 X-RAY EXAM CHEST 1 VIEW: CPT | Mod: 26,76

## 2022-12-31 PROCEDURE — 99233 SBSQ HOSP IP/OBS HIGH 50: CPT

## 2022-12-31 RX ORDER — ERYTHROPOIETIN 10000 [IU]/ML
5000 INJECTION, SOLUTION INTRAVENOUS; SUBCUTANEOUS
Refills: 0 | Status: DISCONTINUED | OUTPATIENT
Start: 2022-12-31 | End: 2023-01-20

## 2022-12-31 RX ORDER — SIMETHICONE 80 MG/1
80 TABLET, CHEWABLE ORAL DAILY
Refills: 0 | Status: DISCONTINUED | OUTPATIENT
Start: 2022-12-31 | End: 2023-01-20

## 2022-12-31 RX ADMIN — SEVELAMER CARBONATE 800 MILLIGRAM(S): 2400 POWDER, FOR SUSPENSION ORAL at 05:20

## 2022-12-31 RX ADMIN — ATORVASTATIN CALCIUM 80 MILLIGRAM(S): 80 TABLET, FILM COATED ORAL at 22:58

## 2022-12-31 RX ADMIN — Medication 3: at 15:08

## 2022-12-31 RX ADMIN — CHLORHEXIDINE GLUCONATE 15 MILLILITER(S): 213 SOLUTION TOPICAL at 05:20

## 2022-12-31 RX ADMIN — SENNA PLUS 2 TABLET(S): 8.6 TABLET ORAL at 22:58

## 2022-12-31 RX ADMIN — AMIODARONE HYDROCHLORIDE 200 MILLIGRAM(S): 400 TABLET ORAL at 05:20

## 2022-12-31 RX ADMIN — SEVELAMER CARBONATE 800 MILLIGRAM(S): 2400 POWDER, FOR SUSPENSION ORAL at 17:56

## 2022-12-31 RX ADMIN — Medication 3: at 17:57

## 2022-12-31 RX ADMIN — Medication 81 MILLIGRAM(S): at 12:20

## 2022-12-31 RX ADMIN — CHLORHEXIDINE GLUCONATE 15 MILLILITER(S): 213 SOLUTION TOPICAL at 18:14

## 2022-12-31 RX ADMIN — HUMAN INSULIN 7 UNIT(S): 100 INJECTION, SUSPENSION SUBCUTANEOUS at 12:48

## 2022-12-31 RX ADMIN — Medication 3: at 02:25

## 2022-12-31 RX ADMIN — MEROPENEM 100 MILLIGRAM(S): 1 INJECTION INTRAVENOUS at 22:58

## 2022-12-31 RX ADMIN — HEPARIN SODIUM 5000 UNIT(S): 5000 INJECTION INTRAVENOUS; SUBCUTANEOUS at 02:27

## 2022-12-31 RX ADMIN — Medication 100 MILLIGRAM(S): at 12:20

## 2022-12-31 RX ADMIN — HUMAN INSULIN 7 UNIT(S): 100 INJECTION, SUSPENSION SUBCUTANEOUS at 17:57

## 2022-12-31 RX ADMIN — CHLORHEXIDINE GLUCONATE 1 APPLICATION(S): 213 SOLUTION TOPICAL at 05:21

## 2022-12-31 RX ADMIN — HUMAN INSULIN 7 UNIT(S): 100 INJECTION, SUSPENSION SUBCUTANEOUS at 05:20

## 2022-12-31 RX ADMIN — SIMETHICONE 80 MILLIGRAM(S): 80 TABLET, CHEWABLE ORAL at 12:19

## 2022-12-31 RX ADMIN — PANTOPRAZOLE SODIUM 40 MILLIGRAM(S): 20 TABLET, DELAYED RELEASE ORAL at 12:20

## 2022-12-31 RX ADMIN — HEPARIN SODIUM 5000 UNIT(S): 5000 INJECTION INTRAVENOUS; SUBCUTANEOUS at 17:56

## 2022-12-31 RX ADMIN — HEPARIN SODIUM 5000 UNIT(S): 5000 INJECTION INTRAVENOUS; SUBCUTANEOUS at 10:38

## 2022-12-31 RX ADMIN — Medication 15 MILLILITER(S): at 12:20

## 2022-12-31 RX ADMIN — CASPOFUNGIN ACETATE 260 MILLIGRAM(S): 7 INJECTION, POWDER, LYOPHILIZED, FOR SOLUTION INTRAVENOUS at 17:56

## 2022-12-31 RX ADMIN — Medication 3: at 10:38

## 2022-12-31 NOTE — PROGRESS NOTE ADULT - PROBLEM SELECTOR PLAN 2
Hgb below goal.  Iron saturation 22 with Ferritin of 355, will start 5k epo with HD sessions    If you have any questions, please feel free to contact me  Abhi Leone  Nephrology Fellow  [phone]; Prefer [employer] TEAMS  (After 5pm or on weekends please page the on-call fellow).    Patient was discussed with Dr. Orozco  who agrees with my A/P. Addendum to follow Hgb below goal.  Iron saturation 22 with Ferritin of 355, will start 5k epo with HD sessions    If you have any questions, please feel free to contact me  Abhi Leone  Nephrology Fellow  [phone]; Prefer [employer] TEAMS  (After 5pm or on weekends please page the on-call fellow).    Patient was discussed with Dr. Orozco  who agrees with my A/P. Addendum to follow Hgb below goal.  Iron saturation 22 with Ferritin of 355, will start 5k epo with HD sessions    If you have any questions, please feel free to contact me  Abhi Leone  Nephrology Fellow  [phone]; Prefer [employer] TEAMS  (After 5pm or on weekends please page the on-call fellow).    Patient was discussed with Dr. Orozco  who agrees with my A/P. Addendum to follow Hgb below goal.  Iron saturation 22 with Ferritin of 355, will start 5k epo with HD sessions    If you have any questions, please feel free to contact me  Abhi Leone  Nephrology Fellow  [phone]; Prefer [employer] TEAMS  (After 5pm or on weekends please page the on-call fellow). Hgb below goal.  Iron saturation 22 with Ferritin of 355, will start 5k epo with HD sessions    If you have any questions, please feel free to contact me  Abhi Leone  Nephrology Fellow  [phone]; Prefer [employer] TEAMS  (After 5pm or on weekends please page the on-call fellow). Hgb below goal.  Iron saturation 22 with Ferritin of 355, will start 5k epo with HD sessions    If you have any questions, please feel free to contact me  Abhi Leone  Nephrology Fellow  [phone]; Prefer [employer] TEAMS  (After 5pm or on weekends please page the on-call fellow).

## 2022-12-31 NOTE — PROGRESS NOTE ADULT - SUBJECTIVE AND OBJECTIVE BOX
[facility] Division of Kidney Diseases & Hypertension  FOLLOW UP NOTE  [phone]--------------------------------------------------------------------------------  Chief Complaint:Acute pancreatitis without infection or necrosis    HPI:  63 y/o male with PMH of CABG, DM, HTN, HLD presenting as transfer from [place] for c/f STEMI. Course c/b gallstone pancreatitis leading to ARDS and shock & cardiac arrest now on [facility] ECMO. Had significant troponin elevation and his LVEF down to 8%. Pt was deemed to be too unstable to have an angiogram and potential PCI due to his co-morbidities & a new subdural bleed. Pt being seen for ERIC. SCr on arrival was 2.15; trended down to hector 1.6 on 11/25 and eventually increased to 3.95 11/28. Pt received IV diuretics as per CTU. Pt initiated on CRRT on 12/5/22 to optimize volume status and electrolytes.  Pt underwent decannulation of ECMO on 12/8/22. Pt was restarted on 12/9/22 for volume overload. Pt underwent mitral clip OR (12/16/22). s/p trach on 12/16     Overnight Events: Pt. seen this AM. CXR does appear improved, however still with interstitial lung markings. Breathing is improved. Will plan for UF session today, can give break on Sunday. Was on trach collar most of the night    PAST HISTORY  --------------------------------------------------------------------------------  No significant changes to PMH, PSH, FHx, SHx, unless otherwise noted    ALLERGIES & MEDICATIONS  --------------------------------------------------------------------------------  Allergies    No Known Allergies    Intolerances      Standing Inpatient Medications  aMIOdarone    Tablet 200 milliGRAM(s) Oral daily  aspirin  chewable 81 milliGRAM(s) Oral daily  atorvastatin 80 milliGRAM(s) Oral at bedtime  caspofungin IVPB 50 milliGRAM(s) IV Intermittent every 24 hours  caspofungin IVPB      chlorhexidine 0.12% Liquid 15 milliLiter(s) Oral Mucosa every 12 hours  chlorhexidine 2% Cloths 1 Application(s) Topical <User Schedule>  dextrose 5%. 1000 milliLiter(s) IV Continuous <Continuous>  dextrose 50% Injectable 50 milliLiter(s) IV Push every 15 minutes  glucagon  Injectable 1 milliGRAM(s) IntraMuscular once  heparin   Injectable 5000 Unit(s) SubCutaneous every 8 hours  insulin lispro (ADMELOG) corrective regimen sliding scale   SubCutaneous every 4 hours  insulin NPH human recombinant 7 Unit(s) SubCutaneous every 6 hours  meropenem  IVPB 500 milliGRAM(s) IV Intermittent <User Schedule>  multivitamin/minerals/iron Oral Solution (CENTRUM) 15 milliLiter(s) Oral daily  pantoprazole  Injectable 40 milliGRAM(s) IV Push daily  senna 2 Tablet(s) Oral at bedtime  sevelamer carbonate Powder 800 milliGRAM(s) Enteral Tube two times a day  thiamine 100 milliGRAM(s) Enteral Tube daily    PRN Inpatient Medications  acetaminophen    Suspension .. 650 milliGRAM(s) Oral every 6 hours PRN  dextrose Oral Gel 15 Gram(s) Oral once PRN  sodium chloride 0.9% lock flush 10 milliLiter(s) IV Push every 1 hour PRN      REVIEW OF SYSTEMS  --------------------------------------------------------------------------------  Gen: No  fevers/chills  Skin: No rashes  Head/Eyes/Ears/Mouth: No headache; Normal hearing; Normal vision w/o blurriness  Respiratory: No dyspnea, cough, wheezing, hemoptysis  CV: No chest pain, PND, orthopnea  GI: No abdominal pain, diarrhea, constipation, nausea, vomiting  : No increased frequency, dysuria, hematuria, nocturia  MSK: No joint pain/swelling; no back pain; no edema  Neuro: No dizziness/lightheadedness, weakness, seizures, numbness, tingling      All other systems were reviewed and are negative, except as noted.    VITALS/PHYSICAL EXAM  --------------------------------------------------------------------------------  T(C): 36.1 (12-31-22 @ 08:00), Max: 36.6 (12-31-22 @ 04:00)  HR: 97 (12-31-22 @ 09:00) (79 - 102)  BP: --  RR: 17 (12-31-22 @ 09:00) (7 - 28)  SpO2: 97% (12-31-22 @ 09:00) (97% - 100%)  Wt(kg): --        12-30-22 @ 07:01  -  12-31-22 @ 07:00  --------------------------------------------------------  IN: 3190 mL / OUT: 3840 mL / NET: -650 mL    12-31-22 @ 07:01  -  12-31-22 @ 09:47  --------------------------------------------------------  IN: 130 mL / OUT: 85 mL / NET: 45 mL      Physical Exam:  	Gen: Sitting up in chair, comfortable. NGT in place  	HEENT: Anicteric  	Pulm:  trach+  	CV: S1S2+  	Abd: Soft, +BS, +abdominal pain LLQ      	Ext: 1+ LE edema B/L  	Neuro: Awake  	Skin: Warm and dry  	Vascular access: L. Femoral catheter      LABS/STUDIES  --------------------------------------------------------------------------------              9.6    10.48 >-----------<  120      [12-31-22 @ 00:25]              30.0     137  |  98  |  77  ----------------------------<  152      [12-31-22 @ 00:24]  3.8   |  25  |  4.20        Ca     8.3     [12-31-22 @ 00:24]      Mg     2.6     [12-31-22 @ 00:24]      Phos  5.5     [12-31-22 @ 00:24]    TPro  6.6  /  Alb  2.9  /  TBili  0.7  /  DBili  x   /  AST  29  /  ALT  21  /  AlkPhos  83  [12-31-22 @ 00:24]          Creatinine Trend:  SCr 4.20 [12-31 @ 00:24]  SCr 5.36 [12-30 @ 00:33]  SCr 4.64 [12-29 @ 00:35]  SCr 5.50 [12-28 @ 00:40]  SCr 4.64 [12-27 @ 00:28]    Urinalysis - [12-26-22 @ 13:19]      Color Yellow / Appearance Clear / SG 1.013 / pH 6.0      Gluc Negative / Ketone Negative  / Bili Negative / Urobili Negative       Blood Large / Protein 100 mg/dL / Leuk Est Moderate / Nitrite Negative      RBC >50 / WBC 35 / Hyaline 1 / Gran  / Sq Epi  / Non Sq Epi 2 / Bacteria Few      Iron 50, TIBC 228, %sat 22      [12-30-22 @ 13:01]  Ferritin 355      [12-30-22 @ 13:01]       [facility] Division of Kidney Diseases & Hypertension  FOLLOW UP NOTE  [phone]--------------------------------------------------------------------------------  Chief Complaint:Acute pancreatitis without infection or necrosis    HPI:  63 y/o male with PMH of CABG, DM, HTN, HLD presenting as transfer from [place] for c/f STEMI. Course c/b gallstone pancreatitis leading to ARDS and shock & cardiac arrest now on [facility] ECMO. Had significant troponin elevation and his LVEF down to 8%. Pt was deemed to be too unstable to have an angiogram and potential PCI due to his co-morbidities & a new subdural bleed. Pt being seen for ERIC. SCr on arrival was 2.15; trended down to hector 1.6 on 11/25 and eventually increased to 3.95 11/28. Pt received IV diuretics as per CTU. Pt initiated on CRRT on 12/5/22 to optimize volume status and electrolytes.  Pt underwent decannulation of ECMO on 12/8/22. Pt was restarted on 12/9/22 for volume overload. Pt underwent mitral clip OR (12/16/22). s/p trach on 12/16     Overnight Events: Pt. seen this AM. CXR does appear improved, however still with interstitial lung markings. Breathing is improved. Will plan for UF session today, can give break on Sunday. Was on trach collar most of the night    PAST HISTORY  --------------------------------------------------------------------------------  No significant changes to PMH, PSH, FHx, SHx, unless otherwise noted    ALLERGIES & MEDICATIONS  --------------------------------------------------------------------------------  Allergies    No Known Allergies    Intolerances      Standing Inpatient Medications  aMIOdarone    Tablet 200 milliGRAM(s) Oral daily  aspirin  chewable 81 milliGRAM(s) Oral daily  atorvastatin 80 milliGRAM(s) Oral at bedtime  caspofungin IVPB 50 milliGRAM(s) IV Intermittent every 24 hours  caspofungin IVPB      chlorhexidine 0.12% Liquid 15 milliLiter(s) Oral Mucosa every 12 hours  chlorhexidine 2% Cloths 1 Application(s) Topical <User Schedule>  dextrose 5%. 1000 milliLiter(s) IV Continuous <Continuous>  dextrose 50% Injectable 50 milliLiter(s) IV Push every 15 minutes  glucagon  Injectable 1 milliGRAM(s) IntraMuscular once  heparin   Injectable 5000 Unit(s) SubCutaneous every 8 hours  insulin lispro (ADMELOG) corrective regimen sliding scale   SubCutaneous every 4 hours  insulin NPH human recombinant 7 Unit(s) SubCutaneous every 6 hours  meropenem  IVPB 500 milliGRAM(s) IV Intermittent <User Schedule>  multivitamin/minerals/iron Oral Solution (CENTRUM) 15 milliLiter(s) Oral daily  pantoprazole  Injectable 40 milliGRAM(s) IV Push daily  senna 2 Tablet(s) Oral at bedtime  sevelamer carbonate Powder 800 milliGRAM(s) Enteral Tube two times a day  thiamine 100 milliGRAM(s) Enteral Tube daily    PRN Inpatient Medications  acetaminophen    Suspension .. 650 milliGRAM(s) Oral every 6 hours PRN  dextrose Oral Gel 15 Gram(s) Oral once PRN  sodium chloride 0.9% lock flush 10 milliLiter(s) IV Push every 1 hour PRN      REVIEW OF SYSTEMS  --------------------------------------------------------------------------------  Gen: No  fevers/chills  Skin: No rashes  Head/Eyes/Ears/Mouth: No headache; Normal hearing; Normal vision w/o blurriness  Respiratory: No dyspnea, cough, wheezing, hemoptysis  CV: No chest pain, PND, orthopnea  GI: No abdominal pain, diarrhea, constipation, nausea, vomiting  : No increased frequency, dysuria, hematuria, nocturia  MSK: No joint pain/swelling; no back pain; no edema  Neuro: No dizziness/lightheadedness, weakness, seizures, numbness, tingling      All other systems were reviewed and are negative, except as noted.    VITALS/PHYSICAL EXAM  --------------------------------------------------------------------------------  T(C): 36.1 (12-31-22 @ 08:00), Max: 36.6 (12-31-22 @ 04:00)  HR: 97 (12-31-22 @ 09:00) (79 - 102)  BP: --  RR: 17 (12-31-22 @ 09:00) (7 - 28)  SpO2: 97% (12-31-22 @ 09:00) (97% - 100%)  Wt(kg): --        12-30-22 @ 07:01  -  12-31-22 @ 07:00  --------------------------------------------------------  IN: 3190 mL / OUT: 3840 mL / NET: -650 mL    12-31-22 @ 07:01  -  12-31-22 @ 09:47  --------------------------------------------------------  IN: 130 mL / OUT: 85 mL / NET: 45 mL      Physical Exam:  	Gen: Sitting up in chair, comfortable. NGT in place  	HEENT: Anicteric  	Pulm:  trach+  	CV: S1S2+  	Abd: Soft, +BS, +abdominal pain LLQ      	Ext: 1+ LE edema B/L  	Neuro: Awake  	Skin: Warm and dry  	Vascular access: L. Femoral catheter      LABS/STUDIES  --------------------------------------------------------------------------------              9.6    10.48 >-----------<  120      [12-31-22 @ 00:25]              30.0     137  |  98  |  77  ----------------------------<  152      [12-31-22 @ 00:24]  3.8   |  25  |  4.20        Ca     8.3     [12-31-22 @ 00:24]      Mg     2.6     [12-31-22 @ 00:24]      Phos  5.5     [12-31-22 @ 00:24]    TPro  6.6  /  Alb  2.9  /  TBili  0.7  /  DBili  x   /  AST  29  /  ALT  21  /  AlkPhos  83  [12-31-22 @ 00:24]          Creatinine Trend:  SCr 4.20 [12-31 @ 00:24]  SCr 5.36 [12-30 @ 00:33]  SCr 4.64 [12-29 @ 00:35]  SCr 5.50 [12-28 @ 00:40]  SCr 4.64 [12-27 @ 00:28]    Urinalysis - [12-26-22 @ 13:19]      Color Yellow / Appearance Clear / SG 1.013 / pH 6.0      Gluc Negative / Ketone Negative  / Bili Negative / Urobili Negative       Blood Large / Protein 100 mg/dL / Leuk Est Moderate / Nitrite Negative      RBC >50 / WBC 35 / Hyaline 1 / Gran  / Sq Epi  / Non Sq Epi 2 / Bacteria Few      Iron 50, TIBC 228, %sat 22      [12-30-22 @ 13:01]  Ferritin 355      [12-30-22 @ 13:01]       [facility] Division of Kidney Diseases & Hypertension  FOLLOW UP NOTE  [phone]--------------------------------------------------------------------------------  Chief Complaint:Acute pancreatitis without infection or necrosis    HPI:  63 y/o male with PMH of CABG, DM, HTN, HLD presenting as transfer from [place] for c/f STEMI. Course c/b gallstone pancreatitis leading to ARDS and shock & cardiac arrest now on [facility] ECMO. Had significant troponin elevation and his LVEF down to 8%. Pt was deemed to be too unstable to have an angiogram and potential PCI due to his co-morbidities & a new subdural bleed. Pt being seen for ERIC. SCr on arrival was 2.15; trended down to hector 1.6 on 11/25 and eventually increased to 3.95 11/28. Pt received IV diuretics as per CTU. Pt initiated on CRRT on 12/5/22 to optimize volume status and electrolytes.  Pt underwent decannulation of ECMO on 12/8/22. Pt was restarted on 12/9/22 for volume overload. Pt underwent mitral clip OR (12/16/22). s/p trach on 12/16     Overnight Events: Pt. seen this AM. CXR does appear improved, however still with interstitial lung markings. Breathing is improved. Will plan for UF session today, can give break on Sunday. Was on trach collar most of the night    PAST HISTORY  --------------------------------------------------------------------------------  No significant changes to PMH, PSH, FHx, SHx, unless otherwise noted    ALLERGIES & MEDICATIONS  --------------------------------------------------------------------------------  Allergies    No Known Allergies    Intolerances      Standing Inpatient Medications  aMIOdarone    Tablet 200 milliGRAM(s) Oral daily  aspirin  chewable 81 milliGRAM(s) Oral daily  atorvastatin 80 milliGRAM(s) Oral at bedtime  caspofungin IVPB 50 milliGRAM(s) IV Intermittent every 24 hours  caspofungin IVPB      chlorhexidine 0.12% Liquid 15 milliLiter(s) Oral Mucosa every 12 hours  chlorhexidine 2% Cloths 1 Application(s) Topical <User Schedule>  dextrose 5%. 1000 milliLiter(s) IV Continuous <Continuous>  dextrose 50% Injectable 50 milliLiter(s) IV Push every 15 minutes  glucagon  Injectable 1 milliGRAM(s) IntraMuscular once  heparin   Injectable 5000 Unit(s) SubCutaneous every 8 hours  insulin lispro (ADMELOG) corrective regimen sliding scale   SubCutaneous every 4 hours  insulin NPH human recombinant 7 Unit(s) SubCutaneous every 6 hours  meropenem  IVPB 500 milliGRAM(s) IV Intermittent <User Schedule>  multivitamin/minerals/iron Oral Solution (CENTRUM) 15 milliLiter(s) Oral daily  pantoprazole  Injectable 40 milliGRAM(s) IV Push daily  senna 2 Tablet(s) Oral at bedtime  sevelamer carbonate Powder 800 milliGRAM(s) Enteral Tube two times a day  thiamine 100 milliGRAM(s) Enteral Tube daily    PRN Inpatient Medications  acetaminophen    Suspension .. 650 milliGRAM(s) Oral every 6 hours PRN  dextrose Oral Gel 15 Gram(s) Oral once PRN  sodium chloride 0.9% lock flush 10 milliLiter(s) IV Push every 1 hour PRN      REVIEW OF SYSTEMS  --------------------------------------------------------------------------------  Gen: No  fevers/chills  Skin: No rashes  Head/Eyes/Ears/Mouth: No headache; Normal hearing; Normal vision w/o blurriness  Respiratory: No dyspnea, cough, wheezing, hemoptysis  CV: No chest pain, PND, orthopnea  GI: No abdominal pain, diarrhea, constipation, nausea, vomiting  : No increased frequency, dysuria, hematuria, nocturia  MSK: No joint pain/swelling; no back pain; no edema  Neuro: No dizziness/lightheadedness, weakness, seizures, numbness, tingling      All other systems were reviewed and are negative, except as noted.    VITALS/PHYSICAL EXAM  --------------------------------------------------------------------------------  T(C): 36.1 (12-31-22 @ 08:00), Max: 36.6 (12-31-22 @ 04:00)  HR: 97 (12-31-22 @ 09:00) (79 - 102)  BP: --  RR: 17 (12-31-22 @ 09:00) (7 - 28)  SpO2: 97% (12-31-22 @ 09:00) (97% - 100%)  Wt(kg): --        12-30-22 @ 07:01  -  12-31-22 @ 07:00  --------------------------------------------------------  IN: 3190 mL / OUT: 3840 mL / NET: -650 mL    12-31-22 @ 07:01  -  12-31-22 @ 09:47  --------------------------------------------------------  IN: 130 mL / OUT: 85 mL / NET: 45 mL      Physical Exam:  	Gen: Sitting up in chair, comfortable. NGT in place  	HEENT: Anicteric  	Pulm:  trach+  	CV: S1S2+  	Abd: Soft, +BS, +abdominal pain LLQ      	Ext: 1+ LE edema B/L  	Neuro: Awake  	Skin: Warm and dry  	Vascular access: L. Femoral catheter      LABS/STUDIES  --------------------------------------------------------------------------------              9.6    10.48 >-----------<  120      [12-31-22 @ 00:25]              30.0     137  |  98  |  77  ----------------------------<  152      [12-31-22 @ 00:24]  3.8   |  25  |  4.20        Ca     8.3     [12-31-22 @ 00:24]      Mg     2.6     [12-31-22 @ 00:24]      Phos  5.5     [12-31-22 @ 00:24]    TPro  6.6  /  Alb  2.9  /  TBili  0.7  /  DBili  x   /  AST  29  /  ALT  21  /  AlkPhos  83  [12-31-22 @ 00:24]          Creatinine Trend:  SCr 4.20 [12-31 @ 00:24]  SCr 5.36 [12-30 @ 00:33]  SCr 4.64 [12-29 @ 00:35]  SCr 5.50 [12-28 @ 00:40]  SCr 4.64 [12-27 @ 00:28]    Urinalysis - [12-26-22 @ 13:19]      Color Yellow / Appearance Clear / SG 1.013 / pH 6.0      Gluc Negative / Ketone Negative  / Bili Negative / Urobili Negative       Blood Large / Protein 100 mg/dL / Leuk Est Moderate / Nitrite Negative      RBC >50 / WBC 35 / Hyaline 1 / Gran  / Sq Epi  / Non Sq Epi 2 / Bacteria Few      Iron 50, TIBC 228, %sat 22      [12-30-22 @ 13:01]  Ferritin 355      [12-30-22 @ 13:01]

## 2022-12-31 NOTE — PROGRESS NOTE ADULT - ATTENDING COMMENTS
I have seen this patient with the fellow and agree with their assessment and plan. I was physically present for significant portions of the evaluation and management (E/M) service provided.  I agree with the above history, physical, and plan which I have reviewed and edited where appropriate.    Plan for UF today  d/w with CTU staff    Trinidad Orozco MD  Pager   Office     Contact me directly via [employer] Teams     (After 5 pm or on weekends please page the on-call fellow/attending, can check [email or web address] for schedule. Login is carmen cantor, schedule under [facility] medicine, psych, derm) I have seen this patient with the fellow and agree with their assessment and plan. I was physically present for significant portions of the evaluation and management (E/M) service provided.  I agree with the above history, physical, and plan which I have reviewed and edited where appropriate.    Plan for UF today  d/w with CTU staff    Trinidad Orozco MD  Pager   Office     Contact me directly via [employer] Teams     (After 5 pm or on weekends please page the on-call fellow/attending, can check [email or web address] for schedule. Login is carmen cantor, schedule under [facility] medicine, psych, derm) I have seen this patient with the fellow and agree with their assessment and plan. I was physically present for significant portions of the evaluation and management (E/M) service provided.  I agree with the above history, physical, and plan which I have reviewed and edited where appropriate.    Plan for UF today  d/w with CTU staff    Trinidad Orozco MD  Pager   Office     Contact me directly via [employer] Teams     (After 5 pm or on weekends please page the on-call fellow/attending, can check [email or web address] for schedule. Login is carmen cantor, schedule under [facility] medicine, psych, derm)

## 2022-12-31 NOTE — PROGRESS NOTE ADULT - PROBLEM SELECTOR PLAN 1
Pt with non oliguric ERIC/ ATN in the setting of STEMI, cardiac arrest & cardiogenic shock  SCr on arrival was 2.15; trended down to hector 1.6 on 11/25; slowly trended up & increased to 3.95 11/28. Pt initiated on CRRT/CVVHDF to optimize volume and electrolytes on 12/5/22. Pt likely with ATN. Pt underwent decannulation of ECMO on 12/8/22 and was taken off CRRT. Pt reinitiated on CRRT overnight on 12/9/22 for volume overload. s/p trach on 12/16. CRRT stopped again 12/19. Bladder scan daily. Now with [facility]. Off Lasix gtt.   S/p HD 12/30. Will plan for UF today considering tomorrow is Sunday and patient still have volume onboard.  Consider using Midodrine before/during HD so that Pt. can tolerate session.  Will need tunneled catheter placed for ongoing dialysis needs once cleared from infectious standpoint.     Monitor labs and urine output. Avoid any potential nephrotoxins. Dose medications as per HD. Pt with non oliguric ERIC/ ATN in the setting of STEMI, cardiac arrest & cardiogenic shock  SCr on arrival was 2.15; trended down to hector 1.6 on 11/25; slowly trended up & increased to 3.95 11/28. Pt initiated on CRRT/CVVHDF to optimize volume and electrolytes on 12/5/22. Pt likely with ATN. Pt underwent decannulation of ECMO on 12/8/22 and was taken off CRRT. Pt reinitiated on CRRT overnight on 12/9/22 for volume overload. s/p trach on 12/16. CRRT stopped again 12/19. Bladder scan daily. Now with [facility]. Off Lasix gtt.   S/p HD 12/30.    Will plan for UF today considering tomorrow is Sunday and patient still have volume onboard.    Consider using Midodrine before/during HD so that Pt. can tolerate session.  Will need tunneled catheter placed for ongoing dialysis needs once cleared from infectious standpoint.     Monitor labs and urine output. Avoid any potential nephrotoxins. Dose medications as per HD.

## 2022-12-31 NOTE — PROGRESS NOTE ADULT - SUBJECTIVE AND OBJECTIVE BOX
CRITICAL CARE ATTENDING - CTICU    MEDICATIONS  (STANDING):  aMIOdarone    Tablet 200 milliGRAM(s) Oral daily  aspirin  chewable 81 milliGRAM(s) Oral daily  atorvastatin 80 milliGRAM(s) Oral at bedtime  caspofungin IVPB 50 milliGRAM(s) IV Intermittent every 24 hours  caspofungin IVPB      chlorhexidine 0.12% Liquid 15 milliLiter(s) Oral Mucosa every 12 hours  chlorhexidine 2% Cloths 1 Application(s) Topical <User Schedule>  dextrose 5%. 1000 milliLiter(s) (50 mL/Hr) IV Continuous <Continuous>  dextrose 50% Injectable 50 milliLiter(s) IV Push every 15 minutes  glucagon  Injectable 1 milliGRAM(s) IntraMuscular once  heparin   Injectable 5000 Unit(s) SubCutaneous every 8 hours  insulin lispro (ADMELOG) corrective regimen sliding scale   SubCutaneous every 4 hours  insulin NPH human recombinant 7 Unit(s) SubCutaneous every 6 hours  meropenem  IVPB 500 milliGRAM(s) IV Intermittent <User Schedule>  multivitamin/minerals/iron Oral Solution (CENTRUM) 15 milliLiter(s) Oral daily  pantoprazole  Injectable 40 milliGRAM(s) IV Push daily  senna 2 Tablet(s) Oral at bedtime  sevelamer carbonate Powder 800 milliGRAM(s) Enteral Tube two times a day  thiamine 100 milliGRAM(s) Enteral Tube daily                                    9.6    10.48 )-----------( 120      ( 31 Dec 2022 00:25 )             30.0           137  |  98  |  77<H>  ----------------------------<  152<H>  3.8   |  25  |  4.20<H>    Ca    8.3<L>      31 Dec 2022 00:24  Phos  5.5       Mg     2.6         TPro  6.6  /  Alb  2.9<L>  /  TBili  0.7  /  DBili  x   /  AST  29  /  ALT  21  /  AlkPhos  83            Mode: standby  FiO2: 40      Daily     Daily Weight in k.9 (31 Dec 2022 00:00)      - @ 07:01  -   @ 07:00  --------------------------------------------------------  IN: 3190 mL / OUT: 3840 mL / NET: -650 mL        Critically Ill patient  : [ ] preoperative , [x] Non Operative    Requires :  [x ] Arterial Line   [ ] Central Line  [ ] PA catheter  [ ] IABP [ ] ECMO  [ ] LVAD  [x ] Ventilator  [ ] pacemaker [x] Trach  [x] HD                      [x ] ABG's     [x ] Pulse Oxymetry Monitoring  Bedside evaluation , monitoring , treatment of hemodynamics , fluids , IVP/ IVCD meds.      Diagnosis:      - [facility] - arrest in [facility]     ECMO Decannulation      POD - IABP placed in cath lab / IABP dc'ed     POD - Mitral Clip x1, TRACH 7 Merari XLT prox with ENT    MI     Acute Pancreatitis     ARDS     Hemodynamic lability,  instability. Requires IVCD [ ] vasopressors [ ]  inotropes  [ ] vasodilator  [x]IVSS fluid  to maintain MAP, perfusion, C.I.     Post Arrest Shock state - resolving    Respiratory Failure - Tracheostomy     Requires chest PT, pulmonary toilet, ambu bagging, suctioning to maintain SaO2,  patent airway and treat atelectasis.     Ventilator Management:  [x ]AC-rest    [ x]CPAP-PS Wean    [x ]Trach Collar     [ ]Extubate    [ ] T-Piece  [ ]peep>5     Difficult weaning process - multiple organ system involvement in critically ill patient     CHF- acute [ x]   chronic [  ]    systolic [ x]   diatolic [ ]          - Echo- EF -   20%  Severe segmental LV systolic dysfunction          [ ] RV dysfunction     - Cxr-cardiomegally, edema          - Clinical-  [ ]inotropes   [ ]pressors  [ ]diuresis   [ ]IABP      [ ]LVAD   [x]Respiratory Failure.  [x] HD    Cardiogenic Shock - resolving    [ x] Hemodialysis- yesterday [ x] Hemofiltration:  [x ] negative fluid balance [ ] even fluid balance [ ] positive fluid balance, in a hemodynamically unstable patient.     Hypotension     DM- NPH/ Admelog sliding scale    Renal Failure - Acute Kidney Injury     Requires bedside physical therapy, mobilization and total half-way care.     Tolerates NG / NJ feeds at [x] goal rate  [ ] trophic rate    [ ]       rate     fevers    Thrombocytopenia    UA c/w infection     Opthamology consult         By signing my name below, I, Maria D'Amico, attest that this documentation has been prepared under the direction and in the presence of Finn Zelaya MD.   Electronically Signed: Maria D'Amico, Scribe 22 @ 07:15      Discussed with CT surgeon, Physician Assistant - Nurse Practitioner- Critical care medicine team.   Discussed at  AM / PM rounds.   Chart, labs , films reviewed.    Cumulative Critical Care Time Given Today:  CRITICAL CARE ATTENDING - CTICU    MEDICATIONS  (STANDING):  aMIOdarone    Tablet 200 milliGRAM(s) Oral daily  aspirin  chewable 81 milliGRAM(s) Oral daily  atorvastatin 80 milliGRAM(s) Oral at bedtime  caspofungin IVPB 50 milliGRAM(s) IV Intermittent every 24 hours  caspofungin IVPB      chlorhexidine 0.12% Liquid 15 milliLiter(s) Oral Mucosa every 12 hours  chlorhexidine 2% Cloths 1 Application(s) Topical <User Schedule>  dextrose 5%. 1000 milliLiter(s) (50 mL/Hr) IV Continuous <Continuous>  dextrose 50% Injectable 50 milliLiter(s) IV Push every 15 minutes  glucagon  Injectable 1 milliGRAM(s) IntraMuscular once  heparin   Injectable 5000 Unit(s) SubCutaneous every 8 hours  insulin lispro (ADMELOG) corrective regimen sliding scale   SubCutaneous every 4 hours  insulin NPH human recombinant 7 Unit(s) SubCutaneous every 6 hours  meropenem  IVPB 500 milliGRAM(s) IV Intermittent <User Schedule>  multivitamin/minerals/iron Oral Solution (CENTRUM) 15 milliLiter(s) Oral daily  pantoprazole  Injectable 40 milliGRAM(s) IV Push daily  senna 2 Tablet(s) Oral at bedtime  sevelamer carbonate Powder 800 milliGRAM(s) Enteral Tube two times a day  thiamine 100 milliGRAM(s) Enteral Tube daily                                    9.6    10.48 )-----------( 120      ( 31 Dec 2022 00:25 )             30.0           137  |  98  |  77<H>  ----------------------------<  152<H>  3.8   |  25  |  4.20<H>    Ca    8.3<L>      31 Dec 2022 00:24  Phos  5.5       Mg     2.6         TPro  6.6  /  Alb  2.9<L>  /  TBili  0.7  /  DBili  x   /  AST  29  /  ALT  21  /  AlkPhos  83            Mode: standby  FiO2: 40      Daily     Daily Weight in k.9 (31 Dec 2022 00:00)      - @ 07:01  -   @ 07:00  --------------------------------------------------------  IN: 3190 mL / OUT: 3840 mL / NET: -650 mL        Critically Ill patient  : [ ] preoperative , [x] Non Operative    Requires :  [x ] Arterial Line   [ ] Central Line  [ ] PA catheter  [ ] IABP [ ] ECMO  [ ] LVAD  [x ] Ventilator  [ ] pacemaker [x] Trach  [x] HD                      [x ] ABG's     [x ] Pulse Oxymetry Monitoring  Bedside evaluation , monitoring , treatment of hemodynamics , fluids , IVP/ IVCD meds.      Diagnosis:      - [facility] - arrest in [facility]     ECMO Decannulation      POD - IABP placed in cath lab / IABP dc'ed     POD - Mitral Clip x1, TRACH 7 Merari XLT prox with ENT    MI     Acute Pancreatitis     ARDS     Hemodynamic lability,  instability. Requires IVCD [ ] vasopressors [ ]  inotropes  [ ] vasodilator  [x]IVSS fluid  to maintain MAP, perfusion, C.I.     Post Arrest Shock state - resolving    Respiratory Failure - Tracheostomy     Requires chest PT, pulmonary toilet, ambu bagging, suctioning to maintain SaO2,  patent airway and treat atelectasis.     Ventilator Management:  [x ]AC-rest    [ x]CPAP-PS Wean    [x ]Trach Collar     [ ]Extubate    [ ] T-Piece  [ ]peep>5     Difficult weaning process - multiple organ system involvement in critically ill patient     CHF- acute [ x]   chronic [  ]    systolic [ x]   diatolic [ ]          - Echo- EF -   20%  Severe segmental LV systolic dysfunction          [ ] RV dysfunction     - Cxr-cardiomegally, edema          - Clinical-  [ ]inotropes   [ ]pressors  [ ]diuresis   [ ]IABP      [ ]LVAD   [x]Respiratory Failure.  [x] HD    Cardiogenic Shock - resolving    [ x] Hemodialysis- yesterday [ x] Hemofiltration:  [x ] negative fluid balance [ ] even fluid balance [ ] positive fluid balance, in a hemodynamically unstable patient.     Hypotension     DM- NPH/ Admelog sliding scale    Renal Failure - Acute Kidney Injury     Requires bedside physical therapy, mobilization and total [facility] care.     Tolerates NG / NJ feeds at [x] goal rate  [ ] trophic rate    [ ]       rate     fevers    Thrombocytopenia    UA c/w infection     Opthamology consult         By signing my name below, I, Maria D'Amico, attest that this documentation has been prepared under the direction and in the presence of Finn Zelaya MD.   Electronically Signed: Maria D'Amico, Scribe 22 @ 07:15      Discussed with CT surgeon, Physician Assistant - Nurse Practitioner- Critical care medicine team.   Discussed at  AM / PM rounds.   Chart, labs , films reviewed.    Cumulative Critical Care Time Given Today:  CRITICAL CARE ATTENDING - CTICU    MEDICATIONS  (STANDING):  aMIOdarone    Tablet 200 milliGRAM(s) Oral daily  aspirin  chewable 81 milliGRAM(s) Oral daily  atorvastatin 80 milliGRAM(s) Oral at bedtime  caspofungin IVPB 50 milliGRAM(s) IV Intermittent every 24 hours  caspofungin IVPB      chlorhexidine 0.12% Liquid 15 milliLiter(s) Oral Mucosa every 12 hours  chlorhexidine 2% Cloths 1 Application(s) Topical <User Schedule>  dextrose 5%. 1000 milliLiter(s) (50 mL/Hr) IV Continuous <Continuous>  dextrose 50% Injectable 50 milliLiter(s) IV Push every 15 minutes  glucagon  Injectable 1 milliGRAM(s) IntraMuscular once  heparin   Injectable 5000 Unit(s) SubCutaneous every 8 hours  insulin lispro (ADMELOG) corrective regimen sliding scale   SubCutaneous every 4 hours  insulin NPH human recombinant 7 Unit(s) SubCutaneous every 6 hours  meropenem  IVPB 500 milliGRAM(s) IV Intermittent <User Schedule>  multivitamin/minerals/iron Oral Solution (CENTRUM) 15 milliLiter(s) Oral daily  pantoprazole  Injectable 40 milliGRAM(s) IV Push daily  senna 2 Tablet(s) Oral at bedtime  sevelamer carbonate Powder 800 milliGRAM(s) Enteral Tube two times a day  thiamine 100 milliGRAM(s) Enteral Tube daily                                    9.6    10.48 )-----------( 120      ( 31 Dec 2022 00:25 )             30.0           137  |  98  |  77<H>  ----------------------------<  152<H>  3.8   |  25  |  4.20<H>    Ca    8.3<L>      31 Dec 2022 00:24  Phos  5.5       Mg     2.6         TPro  6.6  /  Alb  2.9<L>  /  TBili  0.7  /  DBili  x   /  AST  29  /  ALT  21  /  AlkPhos  83            Mode: standby  FiO2: 40      Daily     Daily Weight in k.9 (31 Dec 2022 00:00)      - @ 07:01  -   @ 07:00  --------------------------------------------------------  IN: 3190 mL / OUT: 3840 mL / NET: -650 mL        Critically Ill patient  : [ ] preoperative , [x] Non Operative    Requires :  [x ] Arterial Line   [ ] Central Line  [ ] PA catheter  [ ] IABP [ ] ECMO  [ ] LVAD  [x ] Ventilator  [ ] pacemaker [x] Trach  [x] HD                      [x ] ABG's     [x ] Pulse Oxymetry Monitoring  Bedside evaluation , monitoring , treatment of hemodynamics , fluids , IVP/ IVCD meds.      Diagnosis:      - [facility] - arrest in [facility]     ECMO Decannulation      POD - IABP placed in cath lab / IABP dc'ed     POD - Mitral Clip x1, TRACH 7 Merari XLT prox with ENT    MI     Acute Pancreatitis     ARDS     Hemodynamic lability,  instability. Requires IVCD [ ] vasopressors [ ]  inotropes  [ ] vasodilator  [x]IVSS fluid  to maintain MAP, perfusion, C.I.     Post Arrest Shock state - resolving    Respiratory Failure - Tracheostomy     Requires chest PT, pulmonary toilet, ambu bagging, suctioning to maintain SaO2,  patent airway and treat atelectasis.     Ventilator Management:  [x ]AC-rest    [ x]CPAP-PS Wean    [x ]Trach Collar     [ ]Extubate    [ ] T-Piece  [ ]peep>5     Difficult weaning process - multiple organ system involvement in critically ill patient     CHF- acute [ x]   chronic [  ]    systolic [ x]   diatolic [ ]          - Echo- EF -   20%  Severe segmental LV systolic dysfunction          [ ] RV dysfunction     - Cxr-cardiomegally, edema          - Clinical-  [ ]inotropes   [ ]pressors  [ ]diuresis   [ ]IABP      [ ]LVAD   [x]Respiratory Failure.  [x] HD    Cardiogenic Shock - resolving    [ x] Hemodialysis- yesterday [ x] Hemofiltration:  [x ] negative fluid balance [ ] even fluid balance [ ] positive fluid balance, in a hemodynamically unstable patient.     Hypotension     DM- NPH/ Admelog sliding scale    Renal Failure - Acute Kidney Injury     Requires bedside physical therapy, mobilization and total [facility] care.     Tolerates NG / NJ feeds at [x] goal rate  [ ] trophic rate    [ ]       rate     fevers    Thrombocytopenia    UA c/w infection     Opthamology consult         By signing my name below, I, Maria D'Amico, attest that this documentation has been prepared under the direction and in the presence of Finn Zelaya MD.   Electronically Signed: Maria D'Amico, Scribe 22 @ 07:15      Discussed with CT surgeon, Physician Assistant - Nurse Practitioner- Critical care medicine team.   Discussed at  AM / PM rounds.   Chart, labs , films reviewed.    Cumulative Critical Care Time Given Today:  CRITICAL CARE ATTENDING - CTICU    MEDICATIONS  (STANDING):  aMIOdarone    Tablet 200 milliGRAM(s) Oral daily  aspirin  chewable 81 milliGRAM(s) Oral daily  atorvastatin 80 milliGRAM(s) Oral at bedtime  caspofungin IVPB 50 milliGRAM(s) IV Intermittent every 24 hours  caspofungin IVPB      chlorhexidine 0.12% Liquid 15 milliLiter(s) Oral Mucosa every 12 hours  chlorhexidine 2% Cloths 1 Application(s) Topical <User Schedule>  dextrose 5%. 1000 milliLiter(s) (50 mL/Hr) IV Continuous <Continuous>  dextrose 50% Injectable 50 milliLiter(s) IV Push every 15 minutes  glucagon  Injectable 1 milliGRAM(s) IntraMuscular once  heparin   Injectable 5000 Unit(s) SubCutaneous every 8 hours  insulin lispro (ADMELOG) corrective regimen sliding scale   SubCutaneous every 4 hours  insulin NPH human recombinant 7 Unit(s) SubCutaneous every 6 hours  meropenem  IVPB 500 milliGRAM(s) IV Intermittent <User Schedule>  multivitamin/minerals/iron Oral Solution (CENTRUM) 15 milliLiter(s) Oral daily  pantoprazole  Injectable 40 milliGRAM(s) IV Push daily  senna 2 Tablet(s) Oral at bedtime  sevelamer carbonate Powder 800 milliGRAM(s) Enteral Tube two times a day  thiamine 100 milliGRAM(s) Enteral Tube daily                                    9.6    10.48 )-----------( 120      ( 31 Dec 2022 00:25 )             30.0           137  |  98  |  77<H>  ----------------------------<  152<H>  3.8   |  25  |  4.20<H>    Ca    8.3<L>      31 Dec 2022 00:24  Phos  5.5       Mg     2.6         TPro  6.6  /  Alb  2.9<L>  /  TBili  0.7  /  DBili  x   /  AST  29  /  ALT  21  /  AlkPhos  83            Mode: standby  FiO2: 40      Daily     Daily Weight in k.9 (31 Dec 2022 00:00)      - @ 07:01  -   @ 07:00  --------------------------------------------------------  IN: 3190 mL / OUT: 3840 mL / NET: -650 mL        Critically Ill patient  : [ ] preoperative , [x] Non Operative    Requires :  [x ] Arterial Line   [ ] Central Line  [ ] PA catheter  [ ] IABP [ ] ECMO  [ ] LVAD  [x ] Ventilator  [ ] pacemaker [x] Trach  [x] HD                      [x ] ABG's     [x ] Pulse Oxymetry Monitoring  Bedside evaluation , monitoring , treatment of hemodynamics , fluids , IVP/ IVCD meds.      Diagnosis:      - [facility] - arrest in [facility]     ECMO Decannulation      POD - IABP placed in cath lab / IABP dc'ed     POD - Mitral Clip x1, TRACH 7 Merari XLT prox with ENT    MI     Acute Pancreatitis     ARDS  resolved    Hemodynamic lability,  instability. Requires IVCD [ ] vasopressors [ ]  inotropes  [ ] vasodilator  [x]IVSS fluid  to maintain MAP, perfusion, C.I.     Post Arrest Shock state - resolving    Respiratory Failure - Tracheostomy     Requires chest PT, pulmonary toilet, ambu bagging, suctioning to maintain SaO2,  patent airway and treat atelectasis.     Ventilator Management:  [x ]AC-rest    [ x]CPAP-PS Wean    [x ]Trach Collar     [ ]Extubate    [ ] T-Piece  [ ]peep>5     Difficult weaning process - multiple organ system involvement in critically ill patient     CHF- acute [ x]   chronic [  ]    systolic [ x]   diatolic [ ]          - Echo- EF -   20%  Severe segmental LV systolic dysfunction          [ ] RV dysfunction     - Cxr-cardiomegally, edema          - Clinical-  [ ]inotropes   [ ]pressors  [ ]diuresis   [ ]IABP      [ ]LVAD   [x]Respiratory Failure.  [x] HD    Cardiogenic Shock - resolving    [ x] Hemodialysis- yesterday [ x] Hemofiltration:  today  [x ] negative fluid balance [ ] even fluid balance [ ] positive fluid balance, in a hemodynamically unstable patient.     Hypotension     DM- NPH/ Admelog sliding scale    Renal Failure - Acute Kidney Injury     Fluid  overload     Requires bedside physical therapy, mobilization and total [facility] care.     Tolerates NG / NJ feeds at [x] goal rate  [ ] trophic rate    [ ]       rate     fevers    Thrombocytopenia    UA c/w infection     Candida in blood x 1            By signing my name below, I, Maria D'Amico, attest that this documentation has been prepared under the direction and in the presence of Finn Zelaya MD.   Electronically Signed: Maria D'Amico, Scribe 22 @ 07:15    I, Finn Zelaya, personally performed the services described in this documentation. All medical record entries made by the scribe were at my direction and in my presence. I have reviewed the chart and agree that the record reflects my personal performance and is accurate and complete.   Finn Zelaya MD.       Discussed with CT surgeon, Physician Assistant - Nurse Practitioner- Critical care medicine team.   Discussed at  AM / PM rounds.   Chart, labs , films reviewed.    Cumulative Critical Care Time Given Today:  35 min CRITICAL CARE ATTENDING - CTICU    MEDICATIONS  (STANDING):  aMIOdarone    Tablet 200 milliGRAM(s) Oral daily  aspirin  chewable 81 milliGRAM(s) Oral daily  atorvastatin 80 milliGRAM(s) Oral at bedtime  caspofungin IVPB 50 milliGRAM(s) IV Intermittent every 24 hours  caspofungin IVPB      chlorhexidine 0.12% Liquid 15 milliLiter(s) Oral Mucosa every 12 hours  chlorhexidine 2% Cloths 1 Application(s) Topical <User Schedule>  dextrose 5%. 1000 milliLiter(s) (50 mL/Hr) IV Continuous <Continuous>  dextrose 50% Injectable 50 milliLiter(s) IV Push every 15 minutes  glucagon  Injectable 1 milliGRAM(s) IntraMuscular once  heparin   Injectable 5000 Unit(s) SubCutaneous every 8 hours  insulin lispro (ADMELOG) corrective regimen sliding scale   SubCutaneous every 4 hours  insulin NPH human recombinant 7 Unit(s) SubCutaneous every 6 hours  meropenem  IVPB 500 milliGRAM(s) IV Intermittent <User Schedule>  multivitamin/minerals/iron Oral Solution (CENTRUM) 15 milliLiter(s) Oral daily  pantoprazole  Injectable 40 milliGRAM(s) IV Push daily  senna 2 Tablet(s) Oral at bedtime  sevelamer carbonate Powder 800 milliGRAM(s) Enteral Tube two times a day  thiamine 100 milliGRAM(s) Enteral Tube daily                                    9.6    10.48 )-----------( 120      ( 31 Dec 2022 00:25 )             30.0           137  |  98  |  77<H>  ----------------------------<  152<H>  3.8   |  25  |  4.20<H>    Ca    8.3<L>      31 Dec 2022 00:24  Phos  5.5       Mg     2.6         TPro  6.6  /  Alb  2.9<L>  /  TBili  0.7  /  DBili  x   /  AST  29  /  ALT  21  /  AlkPhos  83            Mode: standby  FiO2: 40      Daily     Daily Weight in k.9 (31 Dec 2022 00:00)      - @ 07:01  -   @ 07:00  --------------------------------------------------------  IN: 3190 mL / OUT: 3840 mL / NET: -650 mL        Critically Ill patient  : [ ] preoperative , [x] Non Operative    Requires :  [x ] Arterial Line   [ ] Central Line  [ ] PA catheter  [ ] IABP [ ] ECMO  [ ] LVAD  [x ] Ventilator  [ ] pacemaker [x] Trach  [x] HD                      [x ] ABG's     [x ] Pulse Oxymetry Monitoring  Bedside evaluation , monitoring , treatment of hemodynamics , fluids , IVP/ IVCD meds.      Diagnosis:      - [facility] - arrest in SICU     ECMO Decannulation      POD - IABP placed in cath lab / IABP dc'ed     POD - Mitral Clip x1, TRACH 7 Merari XLT prox with ENT    MI     Acute Pancreatitis     ARDS  resolved    Hemodynamic lability,  instability. Requires IVCD [ ] vasopressors [ ]  inotropes  [ ] vasodilator  [x]IVSS fluid  to maintain MAP, perfusion, C.I.     Post Arrest Shock state - resolving    Respiratory Failure - Tracheostomy     Requires chest PT, pulmonary toilet, ambu bagging, suctioning to maintain SaO2,  patent airway and treat atelectasis.     Ventilator Management:  [x ]AC-rest    [ x]CPAP-PS Wean    [x ]Trach Collar     [ ]Extubate    [ ] T-Piece  [ ]peep>5     Difficult weaning process - multiple organ system involvement in critically ill patient     CHF- acute [ x]   chronic [  ]    systolic [ x]   diatolic [ ]          - Echo- EF -   20%  Severe segmental LV systolic dysfunction          [ ] RV dysfunction     - Cxr-cardiomegally, edema          - Clinical-  [ ]inotropes   [ ]pressors  [ ]diuresis   [ ]IABP      [ ]LVAD   [x]Respiratory Failure.  [x] HD    Cardiogenic Shock - resolving    [ x] Hemodialysis- yesterday [ x] Hemofiltration:  today  [x ] negative fluid balance [ ] even fluid balance [ ] positive fluid balance, in a hemodynamically unstable patient.     Hypotension     DM- NPH/ Admelog sliding scale    Renal Failure - Acute Kidney Injury     Fluid  overload     Requires bedside physical therapy, mobilization and total [facility] care.     Tolerates NG / NJ feeds at [x] goal rate  [ ] trophic rate    [ ]       rate     fevers    Thrombocytopenia    UA c/w infection     Candida in blood x 1            By signing my name below, I, Maria D'Amico, attest that this documentation has been prepared under the direction and in the presence of Finn Zelaya MD.   Electronically Signed: Maria D'Amico, Scribe 22 @ 07:15    I, Finn Zelaya, personally performed the services described in this documentation. All medical record entries made by the scribe were at my direction and in my presence. I have reviewed the chart and agree that the record reflects my personal performance and is accurate and complete.   Finn Zelaya MD.       Discussed with CT surgeon, Physician Assistant - Nurse Practitioner- Critical care medicine team.   Discussed at  AM / PM rounds.   Chart, labs , films reviewed.    Cumulative Critical Care Time Given Today:  35 min CRITICAL CARE ATTENDING - CTICU    MEDICATIONS  (STANDING):  aMIOdarone    Tablet 200 milliGRAM(s) Oral daily  aspirin  chewable 81 milliGRAM(s) Oral daily  atorvastatin 80 milliGRAM(s) Oral at bedtime  caspofungin IVPB 50 milliGRAM(s) IV Intermittent every 24 hours  caspofungin IVPB      chlorhexidine 0.12% Liquid 15 milliLiter(s) Oral Mucosa every 12 hours  chlorhexidine 2% Cloths 1 Application(s) Topical <User Schedule>  dextrose 5%. 1000 milliLiter(s) (50 mL/Hr) IV Continuous <Continuous>  dextrose 50% Injectable 50 milliLiter(s) IV Push every 15 minutes  glucagon  Injectable 1 milliGRAM(s) IntraMuscular once  heparin   Injectable 5000 Unit(s) SubCutaneous every 8 hours  insulin lispro (ADMELOG) corrective regimen sliding scale   SubCutaneous every 4 hours  insulin NPH human recombinant 7 Unit(s) SubCutaneous every 6 hours  meropenem  IVPB 500 milliGRAM(s) IV Intermittent <User Schedule>  multivitamin/minerals/iron Oral Solution (CENTRUM) 15 milliLiter(s) Oral daily  pantoprazole  Injectable 40 milliGRAM(s) IV Push daily  senna 2 Tablet(s) Oral at bedtime  sevelamer carbonate Powder 800 milliGRAM(s) Enteral Tube two times a day  thiamine 100 milliGRAM(s) Enteral Tube daily                                    9.6    10.48 )-----------( 120      ( 31 Dec 2022 00:25 )             30.0           137  |  98  |  77<H>  ----------------------------<  152<H>  3.8   |  25  |  4.20<H>    Ca    8.3<L>      31 Dec 2022 00:24  Phos  5.5       Mg     2.6         TPro  6.6  /  Alb  2.9<L>  /  TBili  0.7  /  DBili  x   /  AST  29  /  ALT  21  /  AlkPhos  83            Mode: standby  FiO2: 40      Daily     Daily Weight in k.9 (31 Dec 2022 00:00)      - @ 07:01  -   @ 07:00  --------------------------------------------------------  IN: 3190 mL / OUT: 3840 mL / NET: -650 mL        Critically Ill patient  : [ ] preoperative , [x] Non Operative    Requires :  [x ] Arterial Line   [ ] Central Line  [ ] PA catheter  [ ] IABP [ ] ECMO  [ ] LVAD  [x ] Ventilator  [ ] pacemaker [x] Trach  [x] HD                      [x ] ABG's     [x ] Pulse Oxymetry Monitoring  Bedside evaluation , monitoring , treatment of hemodynamics , fluids , IVP/ IVCD meds.      Diagnosis:      - [facility] ECMO - arrest in S[facility]     ECMO Decannulation      POD - IABP placed in cath lab / IABP dc'ed     POD - Mitral Clip x1, TRACH 7 Merari XLT prox with ENT    MI     Acute Pancreatitis     ARDS  resolved    Hemodynamic lability,  instability. Requires IVCD [ ] vasopressors [ ]  inotropes  [ ] vasodilator  [x]IVSS fluid  to maintain MAP, perfusion, C.I.     Post Arrest Shock state - resolving    Respiratory Failure - Tracheostomy     Requires chest PT, pulmonary toilet, ambu bagging, suctioning to maintain SaO2,  patent airway and treat atelectasis.     Ventilator Management:  [x ]AC-rest    [ x]CPAP-PS Wean    [x ]Trach Collar     [ ]Extubate    [ ] T-Piece  [ ]peep>5     Difficult weaning process - multiple organ system involvement in critically ill patient     CHF- acute [ x]   chronic [  ]    systolic [ x]   diatolic [ ]          - Echo- EF -   20%  Severe segmental LV systolic dysfunction          [ ] RV dysfunction     - Cxr-cardiomegally, edema          - Clinical-  [ ]inotropes   [ ]pressors  [ ]diuresis   [ ]IABP      [ ]LVAD   [x]Respiratory Failure.  [x] HD    Cardiogenic Shock - resolving    [ x] Hemodialysis- yesterday [ x] Hemofiltration:  today  [x ] negative fluid balance [ ] even fluid balance [ ] positive fluid balance, in a hemodynamically unstable patient.     Hypotension     DM- NPH/ Admelog sliding scale    Renal Failure - Acute Kidney Injury     Fluid  overload     Requires bedside physical therapy, mobilization and total snf care.     Tolerates NG / NJ feeds at [x] goal rate  [ ] trophic rate    [ ]       rate     fevers    Thrombocytopenia    UA c/w infection     Candida in blood x 1            By signing my name below, I, Maria D'Amico, attest that this documentation has been prepared under the direction and in the presence of Finn Zelaya MD.   Electronically Signed: Maria D'Amico, Scribe 22 @ 07:15    I, Finn Zelaya, personally performed the services described in this documentation. All medical record entries made by the scribe were at my direction and in my presence. I have reviewed the chart and agree that the record reflects my personal performance and is accurate and complete.   Finn Zelaya MD.       Discussed with CT surgeon, Physician Assistant - Nurse Practitioner- Critical care medicine team.   Discussed at  AM / PM rounds.   Chart, labs , films reviewed.    Cumulative Critical Care Time Given Today:  35 min

## 2023-01-01 LAB
ALBUMIN SERPL ELPH-MCNC: 3 G/DL — LOW (ref 3.3–5)
ALP SERPL-CCNC: 82 U/L — SIGNIFICANT CHANGE UP (ref 40–120)
ALT FLD-CCNC: 19 U/L — SIGNIFICANT CHANGE UP (ref 10–45)
ANION GAP SERPL CALC-SCNC: 16 MMOL/L — SIGNIFICANT CHANGE UP (ref 5–17)
AST SERPL-CCNC: 29 U/L — SIGNIFICANT CHANGE UP (ref 10–40)
BILIRUB SERPL-MCNC: 0.8 MG/DL — SIGNIFICANT CHANGE UP (ref 0.2–1.2)
BUN SERPL-MCNC: 90 MG/DL — HIGH (ref 7–23)
CALCIUM SERPL-MCNC: 8.3 MG/DL — LOW (ref 8.4–10.5)
CHLORIDE SERPL-SCNC: 98 MMOL/L — SIGNIFICANT CHANGE UP (ref 96–108)
CO2 SERPL-SCNC: 24 MMOL/L — SIGNIFICANT CHANGE UP (ref 22–31)
CREAT SERPL-MCNC: 4.56 MG/DL — HIGH (ref 0.5–1.3)
EGFR: 14 ML/MIN/1.73M2 — LOW
GAS PNL BLDA: SIGNIFICANT CHANGE UP
GLUCOSE BLDC GLUCOMTR-MCNC: 125 MG/DL — HIGH (ref 70–99)
GLUCOSE BLDC GLUCOMTR-MCNC: 137 MG/DL — HIGH (ref 70–99)
GLUCOSE BLDC GLUCOMTR-MCNC: 153 MG/DL — HIGH (ref 70–99)
GLUCOSE BLDC GLUCOMTR-MCNC: 164 MG/DL — HIGH (ref 70–99)
GLUCOSE BLDC GLUCOMTR-MCNC: 175 MG/DL — HIGH (ref 70–99)
GLUCOSE SERPL-MCNC: 142 MG/DL — HIGH (ref 70–99)
GRAM STN FLD: SIGNIFICANT CHANGE UP
HCT VFR BLD CALC: 30.5 % — LOW (ref 39–50)
HGB BLD-MCNC: 9.9 G/DL — LOW (ref 13–17)
MAGNESIUM SERPL-MCNC: 2.8 MG/DL — HIGH (ref 1.6–2.6)
MCHC RBC-ENTMCNC: 30.1 PG — SIGNIFICANT CHANGE UP (ref 27–34)
MCHC RBC-ENTMCNC: 32.5 GM/DL — SIGNIFICANT CHANGE UP (ref 32–36)
MCV RBC AUTO: 92.7 FL — SIGNIFICANT CHANGE UP (ref 80–100)
NRBC # BLD: 0 /100 WBCS — SIGNIFICANT CHANGE UP (ref 0–0)
PHOSPHATE SERPL-MCNC: 6.5 MG/DL — HIGH (ref 2.5–4.5)
PLATELET # BLD AUTO: 105 K/UL — LOW (ref 150–400)
POTASSIUM BLDA-SCNC: 4.2 MMOL/L — SIGNIFICANT CHANGE UP (ref 3.5–5.1)
POTASSIUM SERPL-MCNC: 4.2 MMOL/L — SIGNIFICANT CHANGE UP (ref 3.5–5.3)
POTASSIUM SERPL-SCNC: 4.2 MMOL/L — SIGNIFICANT CHANGE UP (ref 3.5–5.3)
PROT SERPL-MCNC: 6.9 G/DL — SIGNIFICANT CHANGE UP (ref 6–8.3)
RBC # BLD: 3.29 M/UL — LOW (ref 4.2–5.8)
RBC # FLD: 18.1 % — HIGH (ref 10.3–14.5)
SODIUM SERPL-SCNC: 138 MMOL/L — SIGNIFICANT CHANGE UP (ref 135–145)
SPECIMEN SOURCE: SIGNIFICANT CHANGE UP
WBC # BLD: 13.27 K/UL — HIGH (ref 3.8–10.5)
WBC # FLD AUTO: 13.27 K/UL — HIGH (ref 3.8–10.5)

## 2023-01-01 PROCEDURE — 99232 SBSQ HOSP IP/OBS MODERATE 35: CPT

## 2023-01-01 PROCEDURE — 99291 CRITICAL CARE FIRST HOUR: CPT

## 2023-01-01 PROCEDURE — 99232 SBSQ HOSP IP/OBS MODERATE 35: CPT | Mod: GC

## 2023-01-01 PROCEDURE — 71045 X-RAY EXAM CHEST 1 VIEW: CPT | Mod: 26,76

## 2023-01-01 RX ORDER — BUMETANIDE 0.25 MG/ML
2 INJECTION INTRAMUSCULAR; INTRAVENOUS ONCE
Refills: 0 | Status: COMPLETED | OUTPATIENT
Start: 2023-01-01 | End: 2023-01-01

## 2023-01-01 RX ORDER — PROPOFOL 10 MG/ML
17.94 INJECTION, EMULSION INTRAVENOUS
Qty: 500 | Refills: 0 | Status: DISCONTINUED | OUTPATIENT
Start: 2023-01-01 | End: 2023-01-02

## 2023-01-01 RX ORDER — HYDROMORPHONE HYDROCHLORIDE 2 MG/ML
1 INJECTION INTRAMUSCULAR; INTRAVENOUS; SUBCUTANEOUS ONCE
Refills: 0 | Status: DISCONTINUED | OUTPATIENT
Start: 2023-01-01 | End: 2023-01-01

## 2023-01-01 RX ORDER — VECURONIUM BROMIDE 20 MG/1
5 INJECTION, POWDER, FOR SOLUTION INTRAVENOUS ONCE
Refills: 0 | Status: DISCONTINUED | OUTPATIENT
Start: 2023-01-01 | End: 2023-01-02

## 2023-01-01 RX ADMIN — HYDROMORPHONE HYDROCHLORIDE 1 MILLIGRAM(S): 2 INJECTION INTRAMUSCULAR; INTRAVENOUS; SUBCUTANEOUS at 23:39

## 2023-01-01 RX ADMIN — HEPARIN SODIUM 5000 UNIT(S): 5000 INJECTION INTRAVENOUS; SUBCUTANEOUS at 00:49

## 2023-01-01 RX ADMIN — Medication 100 MILLIGRAM(S): at 13:14

## 2023-01-01 RX ADMIN — ATORVASTATIN CALCIUM 80 MILLIGRAM(S): 80 TABLET, FILM COATED ORAL at 21:29

## 2023-01-01 RX ADMIN — AMIODARONE HYDROCHLORIDE 200 MILLIGRAM(S): 400 TABLET ORAL at 06:14

## 2023-01-01 RX ADMIN — HUMAN INSULIN 7 UNIT(S): 100 INJECTION, SUSPENSION SUBCUTANEOUS at 00:49

## 2023-01-01 RX ADMIN — CASPOFUNGIN ACETATE 260 MILLIGRAM(S): 7 INJECTION, POWDER, LYOPHILIZED, FOR SOLUTION INTRAVENOUS at 17:48

## 2023-01-01 RX ADMIN — Medication 3: at 13:15

## 2023-01-01 RX ADMIN — PROPOFOL 10 MICROGRAM(S)/KG/MIN: 10 INJECTION, EMULSION INTRAVENOUS at 23:46

## 2023-01-01 RX ADMIN — Medication 3: at 17:48

## 2023-01-01 RX ADMIN — CHLORHEXIDINE GLUCONATE 1 APPLICATION(S): 213 SOLUTION TOPICAL at 06:40

## 2023-01-01 RX ADMIN — CHLORHEXIDINE GLUCONATE 15 MILLILITER(S): 213 SOLUTION TOPICAL at 17:47

## 2023-01-01 RX ADMIN — CHLORHEXIDINE GLUCONATE 15 MILLILITER(S): 213 SOLUTION TOPICAL at 06:14

## 2023-01-01 RX ADMIN — HUMAN INSULIN 7 UNIT(S): 100 INJECTION, SUSPENSION SUBCUTANEOUS at 17:48

## 2023-01-01 RX ADMIN — HEPARIN SODIUM 5000 UNIT(S): 5000 INJECTION INTRAVENOUS; SUBCUTANEOUS at 09:37

## 2023-01-01 RX ADMIN — HUMAN INSULIN 7 UNIT(S): 100 INJECTION, SUSPENSION SUBCUTANEOUS at 06:10

## 2023-01-01 RX ADMIN — BUMETANIDE 2 MILLIGRAM(S): 0.25 INJECTION INTRAMUSCULAR; INTRAVENOUS at 12:15

## 2023-01-01 RX ADMIN — Medication 15 MILLILITER(S): at 13:15

## 2023-01-01 RX ADMIN — Medication 81 MILLIGRAM(S): at 13:14

## 2023-01-01 RX ADMIN — HUMAN INSULIN 7 UNIT(S): 100 INJECTION, SUSPENSION SUBCUTANEOUS at 23:40

## 2023-01-01 RX ADMIN — Medication 3: at 06:13

## 2023-01-01 RX ADMIN — HUMAN INSULIN 7 UNIT(S): 100 INJECTION, SUSPENSION SUBCUTANEOUS at 13:15

## 2023-01-01 RX ADMIN — PANTOPRAZOLE SODIUM 40 MILLIGRAM(S): 20 TABLET, DELAYED RELEASE ORAL at 13:14

## 2023-01-01 RX ADMIN — SEVELAMER CARBONATE 800 MILLIGRAM(S): 2400 POWDER, FOR SUSPENSION ORAL at 06:14

## 2023-01-01 RX ADMIN — Medication 3: at 09:36

## 2023-01-01 RX ADMIN — SEVELAMER CARBONATE 800 MILLIGRAM(S): 2400 POWDER, FOR SUSPENSION ORAL at 17:47

## 2023-01-01 RX ADMIN — SENNA PLUS 2 TABLET(S): 8.6 TABLET ORAL at 21:29

## 2023-01-01 RX ADMIN — HYDROMORPHONE HYDROCHLORIDE 1 MILLIGRAM(S): 2 INJECTION INTRAMUSCULAR; INTRAVENOUS; SUBCUTANEOUS at 23:54

## 2023-01-01 NOTE — PROGRESS NOTE ADULT - SUBJECTIVE AND OBJECTIVE BOX
CRITICAL CARE ATTENDING - CTICU    MEDICATIONS  (STANDING):  aMIOdarone    Tablet 200 milliGRAM(s) Oral daily  aspirin  chewable 81 milliGRAM(s) Oral daily  atorvastatin 80 milliGRAM(s) Oral at bedtime  caspofungin IVPB 50 milliGRAM(s) IV Intermittent every 24 hours  caspofungin IVPB      chlorhexidine 0.12% Liquid 15 milliLiter(s) Oral Mucosa every 12 hours  chlorhexidine 2% Cloths 1 Application(s) Topical <User Schedule>  dextrose 5%. 1000 milliLiter(s) (50 mL/Hr) IV Continuous <Continuous>  dextrose 50% Injectable 50 milliLiter(s) IV Push every 15 minutes  epoetin teena-epbx (RETACRIT) Injectable 5000 Unit(s) IV Push <User Schedule>  glucagon  Injectable 1 milliGRAM(s) IntraMuscular once  heparin   Injectable 5000 Unit(s) SubCutaneous every 8 hours  insulin lispro (ADMELOG) corrective regimen sliding scale   SubCutaneous every 4 hours  insulin NPH human recombinant 7 Unit(s) SubCutaneous every 6 hours  meropenem  IVPB 500 milliGRAM(s) IV Intermittent <User Schedule>  multivitamin/minerals/iron Oral Solution (CENTRUM) 15 milliLiter(s) Oral daily  pantoprazole  Injectable 40 milliGRAM(s) IV Push daily  senna 2 Tablet(s) Oral at bedtime  sevelamer carbonate Powder 800 milliGRAM(s) Enteral Tube two times a day  thiamine 100 milliGRAM(s) Enteral Tube daily                          9.9    13.27 )-----------( 105      ( 2023 00:40 )             30.5           138  |  98  |  90<H>  ----------------------------<  142<H>  4.2   |  24  |  4.56<H>    Ca    8.3<L>      2023 00:40  Phos  6.5       Mg     2.8         TPro  6.9  /  Alb  3.0<L>  /  TBili  0.8  /  DBili  x   /  AST  29  /  ALT  19  /  AlkPhos  82            Mode: vent off      Daily     Daily Weight in k.5 (2023 00:00)      12-30 @ 07:01  -  12-31 @ 07:00  --------------------------------------------------------  IN: 3190 mL / OUT: 3840 mL / NET: -650 mL     @ 07:01  -  01-01 @ 06:22  --------------------------------------------------------  IN: 1940 mL / OUT: 3080 mL / NET: -1140 mL        Critically Ill patient  : [ ] preoperative , [x] Non Operative    Requires :  [x ] Arterial Line   [ ] Central Line  [ ] PA catheter  [ ] IABP [ ] ECMO  [ ] LVAD  [x ] Ventilator  [ ] pacemaker [x] Trach  [x] HD                      [x ] ABG's     [x ] Pulse Oxymetry Monitoring  Bedside evaluation , monitoring , treatment of hemodynamics , fluids , IVP/ IVCD meds.      Diagnosis:      - [facility] - arrest in [facility]     ECMO Decannulation      POD - IABP placed in cath lab / IABP dc'ed     POD - Mitral Clip x1, TRACH 7 Merari XLT prox with ENT    MI     Acute Pancreatitis     ARDS  resolved    Hemodynamic lability,  instability. Requires IVCD [ ] vasopressors [ ]  inotropes  [ ] vasodilator  [x]IVSS fluid  to maintain MAP, perfusion, C.I.     Post Arrest Shock state - resolving    Respiratory Failure - Tracheostomy     Requires chest PT, pulmonary toilet, ambu bagging, suctioning to maintain SaO2,  patent airway and treat atelectasis.     Ventilator Management:  [x ]AC-rest    [ x]CPAP-PS Wean    [x ]Trach Collar     [ ]Extubate    [ ] T-Piece  [ ]peep>5     Difficult weaning process - multiple organ system involvement in critically ill patient     CHF- acute [ x]   chronic [  ]    systolic [ x]   diatolic [ ]          - Echo- EF -   20%  Severe segmental LV systolic dysfunction          [ ] RV dysfunction     - Cxr-cardiomegally, edema          - Clinical-  [ ]inotropes   [ ]pressors  [ ]diuresis   [ ]IABP      [ ]LVAD   [x]Respiratory Failure.  [x] HD    Cardiogenic Shock - resolving    [ x] Hemodialysis- yesterday [ x] Hemofiltration:  today  [x ] negative fluid balance [ ] even fluid balance [ ] positive fluid balance, in a hemodynamically unstable patient.     Hypotension     DM- NPH/ Admelog sliding scale    Renal Failure - Acute Kidney Injury     Fluid  overload     Requires bedside physical therapy, mobilization and total detention care.     Tolerates NG / NJ feeds at [x] goal rate  [ ] trophic rate    [ ]       rate     fevers    Thrombocytopenia    UA c/w infection     Candida in blood x 1                By signing my name below, I, Rosio Alonzo, attest that this documentation has been prepared under the direction and in the presence of Finn Zelaya MD.   Electronically Signed: Brian Burnett 23 @ 06:22      Discussed with CT surgeon, Physician Assistant - Nurse Practitioner- Critical care medicine team.   Discussed at  AM / PM rounds.   Chart, labs , films reviewed.    Cumulative Critical Care Time Given Today:  CRITICAL CARE ATTENDING - [facility]    MEDICATIONS  (STANDING):  aMIOdarone    Tablet 200 milliGRAM(s) Oral daily  aspirin  chewable 81 milliGRAM(s) Oral daily  atorvastatin 80 milliGRAM(s) Oral at bedtime  caspofungin IVPB 50 milliGRAM(s) IV Intermittent every 24 hours  caspofungin IVPB      chlorhexidine 0.12% Liquid 15 milliLiter(s) Oral Mucosa every 12 hours  chlorhexidine 2% Cloths 1 Application(s) Topical <User Schedule>  dextrose 5%. 1000 milliLiter(s) (50 mL/Hr) IV Continuous <Continuous>  dextrose 50% Injectable 50 milliLiter(s) IV Push every 15 minutes  epoetin teena-epbx (RETACRIT) Injectable 5000 Unit(s) IV Push <User Schedule>  glucagon  Injectable 1 milliGRAM(s) IntraMuscular once  heparin   Injectable 5000 Unit(s) SubCutaneous every 8 hours  insulin lispro (ADMELOG) corrective regimen sliding scale   SubCutaneous every 4 hours  insulin NPH human recombinant 7 Unit(s) SubCutaneous every 6 hours  meropenem  IVPB 500 milliGRAM(s) IV Intermittent <User Schedule>  multivitamin/minerals/iron Oral Solution (CENTRUM) 15 milliLiter(s) Oral daily  pantoprazole  Injectable 40 milliGRAM(s) IV Push daily  senna 2 Tablet(s) Oral at bedtime  sevelamer carbonate Powder 800 milliGRAM(s) Enteral Tube two times a day  thiamine 100 milliGRAM(s) Enteral Tube daily                          9.9    13.27 )-----------( 105      ( 2023 00:40 )             30.5           138  |  98  |  90<H>  ----------------------------<  142<H>  4.2   |  24  |  4.56<H>    Ca    8.3<L>      2023 00:40  Phos  6.5       Mg     2.8         TPro  6.9  /  Alb  3.0<L>  /  TBili  0.8  /  DBili  x   /  AST  29  /  ALT  19  /  AlkPhos  82            Mode: vent off      Daily     Daily Weight in k.5 (2023 00:00)      12-30 @ 07:01  -  12-31 @ 07:00  --------------------------------------------------------  IN: 3190 mL / OUT: 3840 mL / NET: -650 mL     @ 07:01  -  01-01 @ 06:22  --------------------------------------------------------  IN: 1940 mL / OUT: 3080 mL / NET: -1140 mL        Critically Ill patient  : [ ] preoperative , [x] Non Operative    Requires :  [x ] Arterial Line   [ ] Central Line  [ ] PA catheter  [ ] IABP [ ] ECMO  [ ] LVAD  [x ] Ventilator  [ ] pacemaker [x] Trach  [x] HD                      [x ] ABG's     [x ] Pulse Oxymetry Monitoring  Bedside evaluation , monitoring , treatment of hemodynamics , fluids , IVP/ IVCD meds.      Diagnosis:      - [facility] - arrest in [facility]     ECMO Decannulation      POD - IABP placed in cath lab / IABP dc'ed     POD - Mitral Clip x1, TRACH 7 Merari XLT prox with ENT    MI     Acute Pancreatitis     ARDS  resolved    Hemodynamic lability,  instability. Requires IVCD [ ] vasopressors [ ]  inotropes  [ ] vasodilator  [x]IVSS fluid  to maintain MAP, perfusion, C.I.     Post Arrest Shock state - resolving    Respiratory Failure - Tracheostomy     Requires chest PT, pulmonary toilet, ambu bagging, suctioning to maintain SaO2,  patent airway and treat atelectasis.     Ventilator Management:  [x ]AC-rest    [ x]CPAP-PS Wean    [x ]Trach Collar     [ ]Extubate    [ ] T-Piece  [ ]peep>5     Difficult weaning process - multiple organ system involvement in critically ill patient     CHF- acute [ x]   chronic [  ]    systolic [ x]   diatolic [ ]          - Echo- EF -   20%  Severe segmental LV systolic dysfunction          [ ] RV dysfunction     - Cxr-cardiomegally, edema          - Clinical-  [ ]inotropes   [ ]pressors  [ ]diuresis   [ ]IABP      [ ]LVAD   [x]Respiratory Failure.  [x] HD    Cardiogenic Shock - resolving    [ x] Hemodialysis- yesterday [ x] Hemofiltration:  today  [x ] negative fluid balance [ ] even fluid balance [ ] positive fluid balance, in a hemodynamically unstable patient.     Hypotension     DM- NPH/ Admelog sliding scale    Renal Failure - Acute Kidney Injury     Fluid  overload     Requires bedside physical therapy, mobilization and total long-term care.     Tolerates NG / NJ feeds at [x] goal rate  [ ] trophic rate    [ ]       rate     fevers    Thrombocytopenia    UA c/w infection     Candida in blood x 1                By signing my name below, I, Rosio Alonzo, attest that this documentation has been prepared under the direction and in the presence of Finn Zelaya MD.   Electronically Signed: Brian Burnett 23 @ 06:22      Discussed with CT surgeon, Physician Assistant - Nurse Practitioner- Critical care medicine team.   Discussed at  AM / PM rounds.   Chart, labs , films reviewed.    Cumulative Critical Care Time Given Today:  CRITICAL CARE ATTENDING - [facility]    MEDICATIONS  (STANDING):  aMIOdarone    Tablet 200 milliGRAM(s) Oral daily  aspirin  chewable 81 milliGRAM(s) Oral daily  atorvastatin 80 milliGRAM(s) Oral at bedtime  caspofungin IVPB 50 milliGRAM(s) IV Intermittent every 24 hours  caspofungin IVPB      chlorhexidine 0.12% Liquid 15 milliLiter(s) Oral Mucosa every 12 hours  chlorhexidine 2% Cloths 1 Application(s) Topical <User Schedule>  dextrose 5%. 1000 milliLiter(s) (50 mL/Hr) IV Continuous <Continuous>  dextrose 50% Injectable 50 milliLiter(s) IV Push every 15 minutes  epoetin teena-epbx (RETACRIT) Injectable 5000 Unit(s) IV Push <User Schedule>  glucagon  Injectable 1 milliGRAM(s) IntraMuscular once  heparin   Injectable 5000 Unit(s) SubCutaneous every 8 hours  insulin lispro (ADMELOG) corrective regimen sliding scale   SubCutaneous every 4 hours  insulin NPH human recombinant 7 Unit(s) SubCutaneous every 6 hours  meropenem  IVPB 500 milliGRAM(s) IV Intermittent <User Schedule>  multivitamin/minerals/iron Oral Solution (CENTRUM) 15 milliLiter(s) Oral daily  pantoprazole  Injectable 40 milliGRAM(s) IV Push daily  senna 2 Tablet(s) Oral at bedtime  sevelamer carbonate Powder 800 milliGRAM(s) Enteral Tube two times a day  thiamine 100 milliGRAM(s) Enteral Tube daily                          9.9    13.27 )-----------( 105      ( 2023 00:40 )             30.5           138  |  98  |  90<H>  ----------------------------<  142<H>  4.2   |  24  |  4.56<H>    Ca    8.3<L>      2023 00:40  Phos  6.5       Mg     2.8         TPro  6.9  /  Alb  3.0<L>  /  TBili  0.8  /  DBili  x   /  AST  29  /  ALT  19  /  AlkPhos  82            Mode: vent off      Daily     Daily Weight in k.5 (2023 00:00)      12-30 @ 07:01  -  12-31 @ 07:00  --------------------------------------------------------  IN: 3190 mL / OUT: 3840 mL / NET: -650 mL     @ 07:01  -  01-01 @ 06:22  --------------------------------------------------------  IN: 1940 mL / OUT: 3080 mL / NET: -1140 mL        Critically Ill patient  : [ ] preoperative , [x] Non Operative    Requires :  [x ] Arterial Line   [ ] Central Line  [ ] PA catheter  [ ] IABP [ ] ECMO  [ ] LVAD  [x ] Ventilator  [ ] pacemaker [x] Trach  [x] HD                      [x ] ABG's     [x ] Pulse Oxymetry Monitoring  Bedside evaluation , monitoring , treatment of hemodynamics , fluids , IVP/ IVCD meds.      Diagnosis:      - [facility] - arrest in [facility]     ECMO Decannulation      POD - IABP placed in cath lab / IABP dc'ed     POD - Mitral Clip x1, TRACH 7 Merari XLT prox with ENT    MI     Acute Pancreatitis     ARDS  resolved    Hemodynamic lability,  instability. Requires IVCD [ ] vasopressors [ ]  inotropes  [ ] vasodilator  [x]IVSS fluid  to maintain MAP, perfusion, C.I.     Post Arrest Shock state - resolving    Respiratory Failure - Tracheostomy     Requires chest PT, pulmonary toilet, ambu bagging, suctioning to maintain SaO2,  patent airway and treat atelectasis.     Ventilator Management:  [x ]AC-rest    [ x]CPAP-PS Wean    [x ]Trach Collar     [ ]Extubate    [ ] T-Piece  [ ]peep>5     Difficult weaning process - multiple organ system involvement in critically ill patient     CHF- acute [ x]   chronic [  ]    systolic [ x]   diatolic [ ]          - Echo- EF -   20%  Severe segmental LV systolic dysfunction          [ ] RV dysfunction     - Cxr-cardiomegally, edema          - Clinical-  [ ]inotropes   [ ]pressors  [ ]diuresis   [ ]IABP      [ ]LVAD   [x]Respiratory Failure.  [x] HD    Cardiogenic Shock - resolving    [ x] Hemodialysis- yesterday [ x] Hemofiltration:  today  [x ] negative fluid balance [ ] even fluid balance [ ] positive fluid balance, in a hemodynamically unstable patient.     Hypotension     DM- NPH/ Admelog sliding scale    Renal Failure - Acute Kidney Injury     Fluid  overload     Requires bedside physical therapy, mobilization and total long term care.     Tolerates NG / NJ feeds at [x] goal rate  [ ] trophic rate    [ ]       rate     fevers    Thrombocytopenia    UA c/w infection     Candida in blood x 1                By signing my name below, I, Rosio Alonzo, attest that this documentation has been prepared under the direction and in the presence of Finn Zelaya MD.   Electronically Signed: Brian Burnett 23 @ 06:22      Discussed with CT surgeon, Physician Assistant - Nurse Practitioner- Critical care medicine team.   Discussed at  AM / PM rounds.   Chart, labs , films reviewed.    Cumulative Critical Care Time Given Today:  CRITICAL CARE ATTENDING - [facility]    MEDICATIONS  (STANDING):  aMIOdarone    Tablet 200 milliGRAM(s) Oral daily  aspirin  chewable 81 milliGRAM(s) Oral daily  atorvastatin 80 milliGRAM(s) Oral at bedtime  caspofungin IVPB 50 milliGRAM(s) IV Intermittent every 24 hours  caspofungin IVPB      chlorhexidine 0.12% Liquid 15 milliLiter(s) Oral Mucosa every 12 hours  chlorhexidine 2% Cloths 1 Application(s) Topical <User Schedule>  dextrose 5%. 1000 milliLiter(s) (50 mL/Hr) IV Continuous <Continuous>  dextrose 50% Injectable 50 milliLiter(s) IV Push every 15 minutes  epoetin teena-epbx (RETACRIT) Injectable 5000 Unit(s) IV Push <User Schedule>  glucagon  Injectable 1 milliGRAM(s) IntraMuscular once  heparin   Injectable 5000 Unit(s) SubCutaneous every 8 hours  insulin lispro (ADMELOG) corrective regimen sliding scale   SubCutaneous every 4 hours  insulin NPH human recombinant 7 Unit(s) SubCutaneous every 6 hours  meropenem  IVPB 500 milliGRAM(s) IV Intermittent <User Schedule>  multivitamin/minerals/iron Oral Solution (CENTRUM) 15 milliLiter(s) Oral daily  pantoprazole  Injectable 40 milliGRAM(s) IV Push daily  senna 2 Tablet(s) Oral at bedtime  sevelamer carbonate Powder 800 milliGRAM(s) Enteral Tube two times a day  thiamine 100 milliGRAM(s) Enteral Tube daily                          9.9    13.27 )-----------( 105      ( 2023 00:40 )             30.5           138  |  98  |  90<H>  ----------------------------<  142<H>  4.2   |  24  |  4.56<H>    Ca    8.3<L>      2023 00:40  Phos  6.5       Mg     2.8         TPro  6.9  /  Alb  3.0<L>  /  TBili  0.8  /  DBili  x   /  AST  29  /  ALT  19  /  AlkPhos  82            Mode: vent off      Daily     Daily Weight in k.5 (2023 00:00)      12-30 @ 07:01  -  12-31 @ 07:00  --------------------------------------------------------  IN: 3190 mL / OUT: 3840 mL / NET: -650 mL     @ 07:01  -  01-01 @ 06:22  --------------------------------------------------------  IN: 1940 mL / OUT: 3080 mL / NET: -1140 mL        Critically Ill patient  : [ ] preoperative , [x] Non Operative    Requires :  [x ] Arterial Line   [ ] Central Line  [ ] PA catheter  [ ] IABP [ ] ECMO  [ ] LVAD  [x ] Ventilator  [ ] pacemaker [x] Trach  [x] HD                      [x ] ABG's     [x ] Pulse Oxymetry Monitoring  Bedside evaluation , monitoring , treatment of hemodynamics , fluids , IVP/ IVCD meds.      Diagnosis:      - [facility] - arrest in [facility]     ECMO Decannulation      POD - IABP placed in cath lab / IABP dc'ed     POD - Mitral Clip x1, TRACH 7 Merari XLT prox with ENT    MI     Acute Pancreatitis     ARDS  resolved    Hemodynamic lability,  instability. Requires IVCD [ ] vasopressors [ ]  inotropes  [ ] vasodilator  [x]IVSS fluid  to maintain MAP, perfusion, C.I.     Post Arrest Shock state - resolving    Respiratory Failure - Tracheostomy     Requires chest PT, pulmonary toilet, ambu bagging, suctioning to maintain SaO2,  patent airway and treat atelectasis.     Ventilator Management:  [x ]AC-rest    [ x]CPAP-PS Wean    [x ]Trach Collar     [ ]Extubate    [ ] T-Piece  [ ]peep>5     Difficult weaning process - multiple organ system involvement in critically ill patient     CHF- acute [ x]   chronic [  ]    systolic [ x]   diatolic [ ]          - Echo- EF -   20%  Severe segmental LV systolic dysfunction          [ ] RV dysfunction     - Cxr-cardiomegally, edema          - Clinical-  [ ]inotropes   [ ]pressors  [ ]diuresis   [ ]IABP      [ ]LVAD   [x]Respiratory Failure.  [x] HD    Cardiogenic Shock - resolving    [ x] Hemodialysis-[ x] Hemofiltration:  [x ] negative fluid balance [ ] even fluid balance [ ] positive fluid balance, in a hemodynamically unstable patient.     Hypotension     DM- NPH/ Admelog sliding scale    Renal Failure - Acute Kidney Injury     Requires bedside physical therapy, mobilization and total penitentiary care.     Tolerates NG / NJ feeds at [x] goal rate  [ ] trophic rate    [ ]       rate     fevers    Thrombocytopenia    UA c/w infection     Candida in blood x 1                By signing my name below, I, Rosio Alonzo, attest that this documentation has been prepared under the direction and in the presence of Finn Zelaya MD.   Electronically Signed: Brian Burnett 23 @ 06:22      Discussed with CT surgeon, Physician Assistant - Nurse Practitioner- Critical care medicine team.   Discussed at  AM / PM rounds.   Chart, labs , films reviewed.    Cumulative Critical Care Time Given Today:  CRITICAL CARE ATTENDING - [facility]    MEDICATIONS  (STANDING):  aMIOdarone    Tablet 200 milliGRAM(s) Oral daily  aspirin  chewable 81 milliGRAM(s) Oral daily  atorvastatin 80 milliGRAM(s) Oral at bedtime  caspofungin IVPB 50 milliGRAM(s) IV Intermittent every 24 hours  caspofungin IVPB      chlorhexidine 0.12% Liquid 15 milliLiter(s) Oral Mucosa every 12 hours  chlorhexidine 2% Cloths 1 Application(s) Topical <User Schedule>  dextrose 5%. 1000 milliLiter(s) (50 mL/Hr) IV Continuous <Continuous>  dextrose 50% Injectable 50 milliLiter(s) IV Push every 15 minutes  epoetin teena-epbx (RETACRIT) Injectable 5000 Unit(s) IV Push <User Schedule>  glucagon  Injectable 1 milliGRAM(s) IntraMuscular once  heparin   Injectable 5000 Unit(s) SubCutaneous every 8 hours  insulin lispro (ADMELOG) corrective regimen sliding scale   SubCutaneous every 4 hours  insulin NPH human recombinant 7 Unit(s) SubCutaneous every 6 hours  meropenem  IVPB 500 milliGRAM(s) IV Intermittent <User Schedule>  multivitamin/minerals/iron Oral Solution (CENTRUM) 15 milliLiter(s) Oral daily  pantoprazole  Injectable 40 milliGRAM(s) IV Push daily  senna 2 Tablet(s) Oral at bedtime  sevelamer carbonate Powder 800 milliGRAM(s) Enteral Tube two times a day  thiamine 100 milliGRAM(s) Enteral Tube daily                          9.9    13.27 )-----------( 105      ( 2023 00:40 )             30.5           138  |  98  |  90<H>  ----------------------------<  142<H>  4.2   |  24  |  4.56<H>    Ca    8.3<L>      2023 00:40  Phos  6.5       Mg     2.8         TPro  6.9  /  Alb  3.0<L>  /  TBili  0.8  /  DBili  x   /  AST  29  /  ALT  19  /  AlkPhos  82            Mode: vent off      Daily     Daily Weight in k.5 (2023 00:00)      12-30 @ 07:01  -  12-31 @ 07:00  --------------------------------------------------------  IN: 3190 mL / OUT: 3840 mL / NET: -650 mL     @ 07:01  -  01-01 @ 06:22  --------------------------------------------------------  IN: 1940 mL / OUT: 3080 mL / NET: -1140 mL        Critically Ill patient  : [ ] preoperative , [x] Non Operative    Requires :  [x ] Arterial Line   [ ] Central Line  [ ] PA catheter  [ ] IABP [ ] ECMO  [ ] LVAD  [x ] Ventilator  [ ] pacemaker [x] Trach  [x] HD                      [x ] ABG's     [x ] Pulse Oxymetry Monitoring  Bedside evaluation , monitoring , treatment of hemodynamics , fluids , IVP/ IVCD meds.      Diagnosis:      - [facility] - arrest in [facility]     ECMO Decannulation      POD - IABP placed in cath lab / IABP dc'ed     POD - Mitral Clip x1, TRACH 7 Merari XLT prox with ENT    MI     Acute Pancreatitis     ARDS  resolved    Hemodynamic lability,  instability. Requires IVCD [ ] vasopressors [ ]  inotropes  [ ] vasodilator  [x]IVSS fluid  to maintain MAP, perfusion, C.I.     Post Arrest Shock state - resolving    Respiratory Failure - Tracheostomy     Requires chest PT, pulmonary toilet, ambu bagging, suctioning to maintain SaO2,  patent airway and treat atelectasis.     Ventilator Management:  [x ]AC-rest    [ x]CPAP-PS Wean    [x ]Trach Collar     [ ]Extubate    [ ] T-Piece  [ ]peep>5     Difficult weaning process - multiple organ system involvement in critically ill patient     CHF- acute [ x]   chronic [  ]    systolic [ x]   diatolic [ ]          - Echo- EF -   20%  Severe segmental LV systolic dysfunction          [ ] RV dysfunction     - Cxr-cardiomegally, edema          - Clinical-  [ ]inotropes   [ ]pressors  [ ]diuresis   [ ]IABP      [ ]LVAD   [x]Respiratory Failure.  [x] HD    Cardiogenic Shock - resolving    [ x] Hemodialysis-[ x] Hemofiltration:  [x ] negative fluid balance [ ] even fluid balance [ ] positive fluid balance, in a hemodynamically unstable patient.     Hypotension     DM- NPH/ Admelog sliding scale    Renal Failure - Acute Kidney Injury     Requires bedside physical therapy, mobilization and total senior care care.     Tolerates NG / NJ feeds at [x] goal rate  [ ] trophic rate    [ ]       rate     fevers    Thrombocytopenia    UA c/w infection     Candida in blood x 1                By signing my name below, I, Rosio Alonzo, attest that this documentation has been prepared under the direction and in the presence of Finn Zelaya MD.   Electronically Signed: Brian Burnett 23 @ 06:22      Discussed with CT surgeon, Physician Assistant - Nurse Practitioner- Critical care medicine team.   Discussed at  AM / PM rounds.   Chart, labs , films reviewed.    Cumulative Critical Care Time Given Today:  CRITICAL CARE ATTENDING - [facility]ICU    MEDICATIONS  (STANDING):  aMIOdarone    Tablet 200 milliGRAM(s) Oral daily  aspirin  chewable 81 milliGRAM(s) Oral daily  atorvastatin 80 milliGRAM(s) Oral at bedtime  caspofungin IVPB 50 milliGRAM(s) IV Intermittent every 24 hours  caspofungin IVPB      chlorhexidine 0.12% Liquid 15 milliLiter(s) Oral Mucosa every 12 hours  chlorhexidine 2% Cloths 1 Application(s) Topical <User Schedule>  dextrose 5%. 1000 milliLiter(s) (50 mL/Hr) IV Continuous <Continuous>  dextrose 50% Injectable 50 milliLiter(s) IV Push every 15 minutes  epoetin teena-epbx (RETACRIT) Injectable 5000 Unit(s) IV Push <User Schedule>  glucagon  Injectable 1 milliGRAM(s) IntraMuscular once  heparin   Injectable 5000 Unit(s) SubCutaneous every 8 hours  insulin lispro (ADMELOG) corrective regimen sliding scale   SubCutaneous every 4 hours  insulin NPH human recombinant 7 Unit(s) SubCutaneous every 6 hours  meropenem  IVPB 500 milliGRAM(s) IV Intermittent <User Schedule>  multivitamin/minerals/iron Oral Solution (CENTRUM) 15 milliLiter(s) Oral daily  pantoprazole  Injectable 40 milliGRAM(s) IV Push daily  senna 2 Tablet(s) Oral at bedtime  sevelamer carbonate Powder 800 milliGRAM(s) Enteral Tube two times a day  thiamine 100 milliGRAM(s) Enteral Tube daily                          9.9    13.27 )-----------( 105      ( 2023 00:40 )             30.5           138  |  98  |  90<H>  ----------------------------<  142<H>  4.2   |  24  |  4.56<H>    Ca    8.3<L>      2023 00:40  Phos  6.5       Mg     2.8         TPro  6.9  /  Alb  3.0<L>  /  TBili  0.8  /  DBili  x   /  AST  29  /  ALT  19  /  AlkPhos  82            Mode: vent off      Daily     Daily Weight in k.5 (2023 00:00)      12-30 @ 07:01  -  12-31 @ 07:00  --------------------------------------------------------  IN: 3190 mL / OUT: 3840 mL / NET: -650 mL     @ 07:01  -  01-01 @ 06:22  --------------------------------------------------------  IN: 1940 mL / OUT: 3080 mL / NET: -1140 mL        Critically Ill patient  : [ ] preoperative , [x] Non Operative    Requires :  [x ] Arterial Line   [ ] Central Line  [ ] PA catheter  [ ] IABP [ ] ECMO  [ ] LVAD  [x ] Ventilator  [ ] pacemaker [x] Trach  [x] HD                      [x ] ABG's     [x ] Pulse Oxymetry Monitoring  Bedside evaluation , monitoring , treatment of hemodynamics , fluids , IVP/ IVCD meds.      Diagnosis:      - [facility] - arrest in [facility]     ECMO Decannulation      POD - IABP placed in cath lab / IABP dc'ed     POD - Mitral Clip x1, TRACH 7 Merari XLT prox with ENT    MI     Acute Pancreatitis     ARDS  resolved    Hemodynamic lability,  instability. Requires IVCD [ ] vasopressors [ ]  inotropes  [ ] vasodilator  [x]IVSS fluid  to maintain MAP, perfusion, C.I.     Post Arrest Shock state - resolving    Respiratory Failure - Tracheostomy     Requires chest PT, pulmonary toilet, ambu bagging, suctioning to maintain SaO2,  patent airway and treat atelectasis.     Ventilator Management:  [x ]AC-rest    [ x]CPAP-PS Wean    [x ]Trach Collar     [ ]Extubate    [ ] T-Piece  [ ]peep>5     Difficult weaning process - multiple organ system involvement in critically ill patient     CHF- acute [ x]   chronic [  ]    systolic [ x]   diatolic [ ]          - Echo- EF -   20%  Severe segmental LV systolic dysfunction          [ ] RV dysfunction     - Cxr-cardiomegally, edema          - Clinical-  [ ]inotropes   [ ]pressors  [ ]diuresis   [ ]IABP      [ ]LVAD   [x]Respiratory Failure.  [x] HD    Cardiogenic Shock - resolving    [ x] Hemodialysis-[ x] Hemofiltration:  [x ] negative fluid balance [ ] even fluid balance [ ] positive fluid balance, in a hemodynamically unstable patient.     Hypotension     DM- NPH/ Admelog sliding scale    Renal Failure - Acute Kidney Injury     Requires bedside physical therapy, mobilization and total jail care.     Tolerates NG / NJ feeds at [x] goal rate  [ ] trophic rate    [ ]       rate     fevers    Thrombocytopenia    UA c/w infection     Candida in blood x 1                By signing my name below, I, Rosio Alonzo, attest that this documentation has been prepared under the direction and in the presence of Finn Zelaya MD.   Electronically Signed: Brian Burnett 23 @ 06:22      Discussed with CT surgeon, Physician Assistant - Nurse Practitioner- Critical care medicine team.   Discussed at  AM / PM rounds.   Chart, labs , films reviewed.    Cumulative Critical Care Time Given Today:  CRITICAL CARE ATTENDING - CTICU    MEDICATIONS  (STANDING):  aMIOdarone    Tablet 200 milliGRAM(s) Oral daily  aspirin  chewable 81 milliGRAM(s) Oral daily  atorvastatin 80 milliGRAM(s) Oral at bedtime  caspofungin IVPB 50 milliGRAM(s) IV Intermittent every 24 hours  caspofungin IVPB      chlorhexidine 0.12% Liquid 15 milliLiter(s) Oral Mucosa every 12 hours  chlorhexidine 2% Cloths 1 Application(s) Topical <User Schedule>  dextrose 5%. 1000 milliLiter(s) (50 mL/Hr) IV Continuous <Continuous>  dextrose 50% Injectable 50 milliLiter(s) IV Push every 15 minutes  epoetin teena-epbx (RETACRIT) Injectable 5000 Unit(s) IV Push <User Schedule>  glucagon  Injectable 1 milliGRAM(s) IntraMuscular once  heparin   Injectable 5000 Unit(s) SubCutaneous every 8 hours  insulin lispro (ADMELOG) corrective regimen sliding scale   SubCutaneous every 4 hours  insulin NPH human recombinant 7 Unit(s) SubCutaneous every 6 hours  meropenem  IVPB 500 milliGRAM(s) IV Intermittent <User Schedule>  multivitamin/minerals/iron Oral Solution (CENTRUM) 15 milliLiter(s) Oral daily  pantoprazole  Injectable 40 milliGRAM(s) IV Push daily  senna 2 Tablet(s) Oral at bedtime  sevelamer carbonate Powder 800 milliGRAM(s) Enteral Tube two times a day  thiamine 100 milliGRAM(s) Enteral Tube daily                          9.9    13.27 )-----------( 105      ( 2023 00:40 )             30.5           138  |  98  |  90<H>  ----------------------------<  142<H>  4.2   |  24  |  4.56<H>    Ca    8.3<L>      2023 00:40  Phos  6.5       Mg     2.8         TPro  6.9  /  Alb  3.0<L>  /  TBili  0.8  /  DBili  x   /  AST  29  /  ALT  19  /  AlkPhos  82            Mode: vent off      Daily     Daily Weight in k.5 (2023 00:00)      12-30 @ 07:01  -  12-31 @ 07:00  --------------------------------------------------------  IN: 3190 mL / OUT: 3840 mL / NET: -650 mL     @ 07:01  -  01-01 @ 06:22  --------------------------------------------------------  IN: 1940 mL / OUT: 3080 mL / NET: -1140 mL        Critically Ill patient  : [ ] preoperative , [x] Non Operative    Requires :  [x ] Arterial Line   [ x] Central Line  [ ] PA catheter  [ ] IABP [ ] ECMO  [ ] LVAD  [x ] Ventilator  [ ] pacemaker [x] Trach  [x] HD                      [x ] ABG's     [x ] Pulse Oxymetry Monitoring  Bedside evaluation , monitoring , treatment of hemodynamics , fluids , IVP/ IVCD meds.      Diagnosis:      - [facility] - arrest in [facility]     ECMO Decannulation      POD - IABP placed in cath lab / IABP dc'ed     POD - Mitral Clip x1, TRACH 7 Merari XLT prox with ENT    MI     Acute Pancreatitis     ARDS  resolved    Hemodynamic lability,  instability. Requires IVCD [ ] vasopressors [ ]  inotropes  [ ] vasodilator  [x]IVSS fluid  to maintain MAP, perfusion, C.I.     Post Arrest Shock state - resolving    Respiratory Failure - Tracheostomy     Requires chest PT, pulmonary toilet, ambu bagging, suctioning to maintain SaO2,  patent airway and treat atelectasis.     Ventilator Management:  [x ]AC-rest    [ x]CPAP-PS Wean    [x ]Trach Collar     [ ]Extubate    [ ] T-Piece  [ ]peep>5     Difficult weaning process - multiple organ system involvement in critically ill patient     CHF- acute [ x]   chronic [  ]    systolic [ x]   diatolic [ ]          - Echo- EF -   20%  Severe segmental LV systolic dysfunction          [ ] RV dysfunction     - Cxr-cardiomegally, edema          - Clinical-  [ ]inotropes   [ ]pressors  [ ]diuresis   [ ]IABP      [ ]LVAD   [x]Respiratory Failure.  [x] HD    Cardiogenic Shock - resolving    [ x] Hemodialysis-[ x] Hemofiltration:  [x ] negative fluid balance [ ] even fluid balance [ ] positive fluid balance, in a hemodynamically unstable patient.     Hypotension     DM- NPH/ Admelog sliding scale    Renal Failure - Acute Kidney Injury     Requires bedside physical therapy, mobilization and total [facility] care.     Tolerates NG / NJ feeds at [x] goal rate  [ ] trophic rate    [ ]       rate     fevers    Thrombocytopenia    UA c/w infection     Candida in blood x 1    Worsening CXR this AM                 By signing my name below, I, Rosio Alonzo, attest that this documentation has been prepared under the direction and in the presence of Finn Zelaya MD.   Electronically Signed: Brian Burnett 23 @ 06:22    I, Finn Zelaya, personally performed the services described in this documentation. All medical record entries made by the scribe were at my direction and in my presence. I have reviewed the chart and agree that the record reflects my personal performance and is accurate and complete.   Finn Zelaya MD.       Discussed with CT surgeon, Physician Assistant - Nurse Practitioner- Critical care medicine team.   Discussed at  AM / PM rounds.   Chart, labs , films reviewed.    Cumulative Critical Care Time Given Today:  40 min CRITICAL CARE ATTENDING - CTICU    MEDICATIONS  (STANDING):  aMIOdarone    Tablet 200 milliGRAM(s) Oral daily  aspirin  chewable 81 milliGRAM(s) Oral daily  atorvastatin 80 milliGRAM(s) Oral at bedtime  caspofungin IVPB 50 milliGRAM(s) IV Intermittent every 24 hours  caspofungin IVPB      chlorhexidine 0.12% Liquid 15 milliLiter(s) Oral Mucosa every 12 hours  chlorhexidine 2% Cloths 1 Application(s) Topical <User Schedule>  dextrose 5%. 1000 milliLiter(s) (50 mL/Hr) IV Continuous <Continuous>  dextrose 50% Injectable 50 milliLiter(s) IV Push every 15 minutes  epoetin teena-epbx (RETACRIT) Injectable 5000 Unit(s) IV Push <User Schedule>  glucagon  Injectable 1 milliGRAM(s) IntraMuscular once  heparin   Injectable 5000 Unit(s) SubCutaneous every 8 hours  insulin lispro (ADMELOG) corrective regimen sliding scale   SubCutaneous every 4 hours  insulin NPH human recombinant 7 Unit(s) SubCutaneous every 6 hours  meropenem  IVPB 500 milliGRAM(s) IV Intermittent <User Schedule>  multivitamin/minerals/iron Oral Solution (CENTRUM) 15 milliLiter(s) Oral daily  pantoprazole  Injectable 40 milliGRAM(s) IV Push daily  senna 2 Tablet(s) Oral at bedtime  sevelamer carbonate Powder 800 milliGRAM(s) Enteral Tube two times a day  thiamine 100 milliGRAM(s) Enteral Tube daily                          9.9    13.27 )-----------( 105      ( 2023 00:40 )             30.5           138  |  98  |  90<H>  ----------------------------<  142<H>  4.2   |  24  |  4.56<H>    Ca    8.3<L>      2023 00:40  Phos  6.5       Mg     2.8         TPro  6.9  /  Alb  3.0<L>  /  TBili  0.8  /  DBili  x   /  AST  29  /  ALT  19  /  AlkPhos  82            Mode: vent off      Daily     Daily Weight in k.5 (2023 00:00)      12-30 @ 07:01  -  12-31 @ 07:00  --------------------------------------------------------  IN: 3190 mL / OUT: 3840 mL / NET: -650 mL     @ 07:01  -  01-01 @ 06:22  --------------------------------------------------------  IN: 1940 mL / OUT: 3080 mL / NET: -1140 mL        Critically Ill patient  : [ ] preoperative , [x] Non Operative    Requires :  [x ] Arterial Line   [ x] Central Line  [ ] PA catheter  [ ] IABP [ ] ECMO  [ ] LVAD  [x ] Ventilator  [ ] pacemaker [x] Trach  [x] HD                      [x ] ABG's     [x ] Pulse Oxymetry Monitoring  Bedside evaluation , monitoring , treatment of hemodynamics , fluids , IVP/ IVCD meds.      Diagnosis:      - [facility] - arrest in [facility]     ECMO Decannulation      POD - IABP placed in cath lab / IABP dc'ed     POD - Mitral Clip x1, TRACH 7 Merari XLT prox with ENT    MI     Acute Pancreatitis     ARDS  resolved    Hemodynamic lability,  instability. Requires IVCD [ ] vasopressors [ ]  inotropes  [ ] vasodilator  [x]IVSS fluid  to maintain MAP, perfusion, C.I.     Post Arrest Shock state - resolving    Respiratory Failure - Tracheostomy     Requires chest PT, pulmonary toilet, ambu bagging, suctioning to maintain SaO2,  patent airway and treat atelectasis.     Ventilator Management:  [x ]AC-rest    [ x]CPAP-PS Wean    [x ]Trach Collar     [ ]Extubate    [ ] T-Piece  [ ]peep>5     Difficult weaning process - multiple organ system involvement in critically ill patient     CHF- acute [ x]   chronic [  ]    systolic [ x]   diatolic [ ]          - Echo- EF -   20%  Severe segmental LV systolic dysfunction          [ ] RV dysfunction     - Cxr-cardiomegally, edema          - Clinical-  [ ]inotropes   [ ]pressors  [ ]diuresis   [ ]IABP      [ ]LVAD   [x]Respiratory Failure.  [x] HD    Cardiogenic Shock - resolving    [ x] Hemodialysis-[ x] Hemofiltration:  [x ] negative fluid balance [ ] even fluid balance [ ] positive fluid balance, in a hemodynamically unstable patient.     Hypotension     DM- NPH/ Admelog sliding scale    Renal Failure - Acute Kidney Injury     Requires bedside physical therapy, mobilization and total skilled nursing care.     Tolerates NG / NJ feeds at [x] goal rate  [ ] trophic rate    [ ]       rate     fevers    Thrombocytopenia    UA c/w infection     Candida in blood x 1    Worsening CXR this AM                 By signing my name below, I, Rosio Alonzo, attest that this documentation has been prepared under the direction and in the presence of Finn Zelaya MD.   Electronically Signed: Brian Burnett 23 @ 06:22    I, Finn Zelaya, personally performed the services described in this documentation. All medical record entries made by the scribe were at my direction and in my presence. I have reviewed the chart and agree that the record reflects my personal performance and is accurate and complete.   Finn Zelaya MD.       Discussed with CT surgeon, Physician Assistant - Nurse Practitioner- Critical care medicine team.   Discussed at  AM / PM rounds.   Chart, labs , films reviewed.    Cumulative Critical Care Time Given Today:  40 min CRITICAL CARE ATTENDING - CTICU    MEDICATIONS  (STANDING):  aMIOdarone    Tablet 200 milliGRAM(s) Oral daily  aspirin  chewable 81 milliGRAM(s) Oral daily  atorvastatin 80 milliGRAM(s) Oral at bedtime  caspofungin IVPB 50 milliGRAM(s) IV Intermittent every 24 hours  caspofungin IVPB      chlorhexidine 0.12% Liquid 15 milliLiter(s) Oral Mucosa every 12 hours  chlorhexidine 2% Cloths 1 Application(s) Topical <User Schedule>  dextrose 5%. 1000 milliLiter(s) (50 mL/Hr) IV Continuous <Continuous>  dextrose 50% Injectable 50 milliLiter(s) IV Push every 15 minutes  epoetin teena-epbx (RETACRIT) Injectable 5000 Unit(s) IV Push <User Schedule>  glucagon  Injectable 1 milliGRAM(s) IntraMuscular once  heparin   Injectable 5000 Unit(s) SubCutaneous every 8 hours  insulin lispro (ADMELOG) corrective regimen sliding scale   SubCutaneous every 4 hours  insulin NPH human recombinant 7 Unit(s) SubCutaneous every 6 hours  meropenem  IVPB 500 milliGRAM(s) IV Intermittent <User Schedule>  multivitamin/minerals/iron Oral Solution (CENTRUM) 15 milliLiter(s) Oral daily  pantoprazole  Injectable 40 milliGRAM(s) IV Push daily  senna 2 Tablet(s) Oral at bedtime  sevelamer carbonate Powder 800 milliGRAM(s) Enteral Tube two times a day  thiamine 100 milliGRAM(s) Enteral Tube daily                          9.9    13.27 )-----------( 105      ( 2023 00:40 )             30.5           138  |  98  |  90<H>  ----------------------------<  142<H>  4.2   |  24  |  4.56<H>    Ca    8.3<L>      2023 00:40  Phos  6.5       Mg     2.8         TPro  6.9  /  Alb  3.0<L>  /  TBili  0.8  /  DBili  x   /  AST  29  /  ALT  19  /  AlkPhos  82            Mode: vent off      Daily     Daily Weight in k.5 (2023 00:00)      12-30 @ 07:01  -  12-31 @ 07:00  --------------------------------------------------------  IN: 3190 mL / OUT: 3840 mL / NET: -650 mL     @ 07:01  -  01-01 @ 06:22  --------------------------------------------------------  IN: 1940 mL / OUT: 3080 mL / NET: -1140 mL        Critically Ill patient  : [ ] preoperative , [x] Non Operative    Requires :  [x ] Arterial Line   [ x] Central Line  [ ] PA catheter  [ ] IABP [ ] ECMO  [ ] LVAD  [x ] Ventilator  [ ] pacemaker [x] Trach  [x] HD                      [x ] ABG's     [x ] Pulse Oxymetry Monitoring  Bedside evaluation , monitoring , treatment of hemodynamics , fluids , IVP/ IVCD meds.      Diagnosis:      - [facility] - arrest in [facility]     ECMO Decannulation      POD - IABP placed in cath lab / IABP dc'ed     POD - Mitral Clip x1, TRACH 7 Merari XLT prox with ENT    MI     Acute Pancreatitis     ARDS  resolved    Hemodynamic lability,  instability. Requires IVCD [ ] vasopressors [ ]  inotropes  [ ] vasodilator  [x]IVSS fluid  to maintain MAP, perfusion, C.I.     Post Arrest Shock state - resolving    Respiratory Failure - Tracheostomy     Requires chest PT, pulmonary toilet, ambu bagging, suctioning to maintain SaO2,  patent airway and treat atelectasis.     Ventilator Management:  [x ]AC-rest    [ x]CPAP-PS Wean    [x ]Trach Collar     [ ]Extubate    [ ] T-Piece  [ ]peep>5     Difficult weaning process - multiple organ system involvement in critically ill patient     CHF- acute [ x]   chronic [  ]    systolic [ x]   diatolic [ ]          - Echo- EF -   20%  Severe segmental LV systolic dysfunction          [ ] RV dysfunction     - Cxr-cardiomegally, edema          - Clinical-  [ ]inotropes   [ ]pressors  [ ]diuresis   [ ]IABP      [ ]LVAD   [x]Respiratory Failure.  [x] HD    Cardiogenic Shock - resolving    [ x] Hemodialysis-[ x] Hemofiltration:  [x ] negative fluid balance [ ] even fluid balance [ ] positive fluid balance, in a hemodynamically unstable patient.     Hypotension     DM- NPH/ Admelog sliding scale    Renal Failure - Acute Kidney Injury     Requires bedside physical therapy, mobilization and total long term care.     Tolerates NG / NJ feeds at [x] goal rate  [ ] trophic rate    [ ]       rate     fevers    Thrombocytopenia    UA c/w infection     Candida in blood x 1    Worsening CXR this AM                 By signing my name below, I, Rosio Alonzo, attest that this documentation has been prepared under the direction and in the presence of Finn Zelaya MD.   Electronically Signed: Brian Burnett 23 @ 06:22    I, Finn Zelaya, personally performed the services described in this documentation. All medical record entries made by the scribe were at my direction and in my presence. I have reviewed the chart and agree that the record reflects my personal performance and is accurate and complete.   Finn Zelaya MD.       Discussed with CT surgeon, Physician Assistant - Nurse Practitioner- Critical care medicine team.   Discussed at  AM / PM rounds.   Chart, labs , films reviewed.    Cumulative Critical Care Time Given Today:  40 min

## 2023-01-01 NOTE — PROGRESS NOTE ADULT - PROBLEM SELECTOR PLAN 1
Pt with non oliguric ERIC/ ATN in the setting of STEMI, cardiac arrest & cardiogenic shock  SCr on arrival was 2.15; trended down to hector 1.6 on 11/25; slowly trended up & increased to 3.95 11/28. Pt initiated on CRRT/CVVHDF to optimize volume and electrolytes on 12/5/22. Pt likely with ATN. Pt underwent decannulation of ECMO on 12/8/22 and was taken off CRRT. Pt reinitiated on CRRT overnight on 12/9/22 for volume overload. s/p trach on 12/16. CRRT stopped again 12/19. Bladder scan daily. Now with [facility]. Off Lasix gtt.   S/p HD 12/30 and UF session with 2L removal on 12/31    Will plan for HD tomorrow.   Consider using Midodrine before/during HD so that Pt. can tolerate session.  Will need tunneled catheter placed for ongoing dialysis needs once cleared from infectious standpoint.     Monitor labs and urine output. Avoid any potential nephrotoxins. Dose medications as per HD.

## 2023-01-01 NOTE — PROGRESS NOTE ADULT - SUBJECTIVE AND OBJECTIVE BOX
[facility] Division of Kidney Diseases & Hypertension  FOLLOW UP NOTE  [phone]--------------------------------------------------------------------------------  Chief Complaint:Acute pancreatitis without infection or necrosis    63 y/o male with PMH of CABG, DM, HTN, HLD presenting as transfer from [place] for c/f STEMI. Course c/b gallstone pancreatitis leading to ARDS and shock & cardiac arrest now on [facility]. Had significant troponin elevation and his LVEF down to 8%. Pt was deemed to be too unstable to have an angiogram and potential PCI due to his co-morbidities & a new subdural bleed. Pt being seen for ERIC. SCr on arrival was 2.15; trended down to hector 1.6 on 11/25 and eventually increased to 3.95 11/28. Pt received IV diuretics as per CTU. Pt initiated on CRRT on 12/5/22 to optimize volume status and electrolytes.  Pt underwent decannulation of ECMO on 12/8/22. Pt was restarted on 12/9/22 for volume overload. Pt underwent mitral clip OR (12/16/22). s/p trach on 12/16     Overnight Events: Pt. seen this AM sitting up in a chair, comfortable. S/p UF session yesterday        PAST HISTORY  --------------------------------------------------------------------------------  No significant changes to PMH, PSH, FHx, SHx, unless otherwise noted    ALLERGIES & MEDICATIONS  --------------------------------------------------------------------------------  Allergies    No Known Allergies    Intolerances      Standing Inpatient Medications  aMIOdarone    Tablet 200 milliGRAM(s) Oral daily  aspirin  chewable 81 milliGRAM(s) Oral daily  atorvastatin 80 milliGRAM(s) Oral at bedtime  caspofungin IVPB 50 milliGRAM(s) IV Intermittent every 24 hours  caspofungin IVPB      chlorhexidine 0.12% Liquid 15 milliLiter(s) Oral Mucosa every 12 hours  chlorhexidine 2% Cloths 1 Application(s) Topical <User Schedule>  dextrose 5%. 1000 milliLiter(s) IV Continuous <Continuous>  dextrose 50% Injectable 50 milliLiter(s) IV Push every 15 minutes  epoetin teena-epbx (RETACRIT) Injectable 5000 Unit(s) IV Push <User Schedule>  glucagon  Injectable 1 milliGRAM(s) IntraMuscular once  heparin   Injectable 5000 Unit(s) SubCutaneous every 8 hours  insulin lispro (ADMELOG) corrective regimen sliding scale   SubCutaneous every 4 hours  insulin NPH human recombinant 7 Unit(s) SubCutaneous every 6 hours  meropenem  IVPB 500 milliGRAM(s) IV Intermittent <User Schedule>  multivitamin/minerals/iron Oral Solution (CENTRUM) 15 milliLiter(s) Oral daily  pantoprazole  Injectable 40 milliGRAM(s) IV Push daily  senna 2 Tablet(s) Oral at bedtime  sevelamer carbonate Powder 800 milliGRAM(s) Enteral Tube two times a day  thiamine 100 milliGRAM(s) Enteral Tube daily    PRN Inpatient Medications  acetaminophen    Suspension .. 650 milliGRAM(s) Oral every 6 hours PRN  dextrose Oral Gel 15 Gram(s) Oral once PRN  simethicone 80 milliGRAM(s) Chew daily PRN  sodium chloride 0.9% lock flush 10 milliLiter(s) IV Push every 1 hour PRN      REVIEW OF SYSTEMS  --------------------------------------------------------------------------------  Gen: No  fevers/chills  Skin: No rashes  Head/Eyes/Ears/Mouth: No headache; Normal hearing; Normal vision w/o blurriness  Respiratory: No dyspnea, cough, wheezing, hemoptysis  CV: No chest pain, PND, orthopnea  GI: No abdominal pain, diarrhea, constipation, nausea, vomiting  : No increased frequency, dysuria, hematuria, nocturia  MSK: No joint pain/swelling; no back pain; no edema  Neuro: No dizziness/lightheadedness, weakness, seizures, numbness, tingling      All other systems were reviewed and are negative, except as noted.    VITALS/PHYSICAL EXAM  --------------------------------------------------------------------------------  T(C): 36.7 (01-01-23 @ 08:00), Max: 36.8 (01-01-23 @ 04:00)  HR: 98 (01-01-23 @ 08:24) (94 - 104)  BP: --  RR: 24 (01-01-23 @ 08:24) (6 - 35)  SpO2: 98% (01-01-23 @ 08:24) (95% - 100%)  Wt(kg): --        12-31-22 @ 07:01 - 01-01-23 @ 07:00  --------------------------------------------------------  IN: 2070 mL / OUT: 3165 mL / NET: -1095 mL    01-01-23 @ 07:01  -  01-01-23 @ 09:06  --------------------------------------------------------  IN: 65 mL / OUT: 45 mL / NET: 20 mL      Physical Exam:  	Gen: Sitting up in chair, comfortable. NGT in place  	HEENT: Anicteric  	Pulm:  trach+  	CV: S1S2+  	Abd: Soft, +BS, +abdominal pain LLQ      	Ext: 1+ LE edema B/L  	Neuro: Awake  	Skin: Warm and dry  	Vascular access: L. Femoral catheter        LABS/STUDIES  --------------------------------------------------------------------------------              9.9    13.27 >-----------<  105      [01-01-23 @ 00:40]              30.5     138  |  98  |  90  ----------------------------<  142      [01-01-23 @ 00:40]  4.2   |  24  |  4.56        Ca     8.3     [01-01-23 @ 00:40]      Mg     2.8     [01-01-23 @ 00:40]      Phos  6.5     [01-01-23 @ 00:40]    TPro  6.9  /  Alb  3.0  /  TBili  0.8  /  DBili  x   /  AST  29  /  ALT  19  /  AlkPhos  82  [01-01-23 @ 00:40]          Creatinine Trend:  SCr 4.56 [01-01 @ 00:40]  SCr 4.20 [12-31 @ 00:24]  SCr 5.36 [12-30 @ 00:33]  SCr 4.64 [12-29 @ 00:35]  SCr 5.50 [12-28 @ 00:40]    Urinalysis - [12-26-22 @ 13:19]      Color Yellow / Appearance Clear / SG 1.013 / pH 6.0      Gluc Negative / Ketone Negative  / Bili Negative / Urobili Negative       Blood Large / Protein 100 mg/dL / Leuk Est Moderate / Nitrite Negative      RBC >50 / WBC 35 / Hyaline 1 / Gran  / Sq Epi  / Non Sq Epi 2 / Bacteria Few      Iron 39, TIBC 222, %sat 18      [12-31-22 @ 06:41]  Ferritin 346      [12-31-22 @ 06:42]       [facility] Division of Kidney Diseases & Hypertension  FOLLOW UP NOTE  [phone]--------------------------------------------------------------------------------  Chief Complaint:Acute pancreatitis without infection or necrosis    61 y/o male with PMH of CABG, DM, HTN, HLD presenting as transfer from [place] for c/f STEMI. Course c/b gallstone pancreatitis leading to ARDS and shock & cardiac arrest now on [facility] ECM[facility]. Had significant troponin elevation and his LVEF down to 8%. Pt was deemed to be too unstable to have an angiogram and potential PCI due to his co-morbidities & a new subdural bleed. Pt being seen for ERIC. SCr on arrival was 2.15; trended down to hector 1.6 on 11/25 and eventually increased to 3.95 11/28. Pt received IV diuretics as per CTU. Pt initiated on CRRT on 12/5/22 to optimize volume status and electrolytes.  Pt underwent decannulation of ECMO on 12/8/22. Pt was restarted on 12/9/22 for volume overload. Pt underwent mitral clip OR (12/16/22). s/p trach on 12/16     Overnight Events: Pt. seen this AM sitting up in a chair, comfortable. S/p UF session yesterday        PAST HISTORY  --------------------------------------------------------------------------------  No significant changes to PMH, PSH, FHx, SHx, unless otherwise noted    ALLERGIES & MEDICATIONS  --------------------------------------------------------------------------------  Allergies    No Known Allergies    Intolerances      Standing Inpatient Medications  aMIOdarone    Tablet 200 milliGRAM(s) Oral daily  aspirin  chewable 81 milliGRAM(s) Oral daily  atorvastatin 80 milliGRAM(s) Oral at bedtime  caspofungin IVPB 50 milliGRAM(s) IV Intermittent every 24 hours  caspofungin IVPB      chlorhexidine 0.12% Liquid 15 milliLiter(s) Oral Mucosa every 12 hours  chlorhexidine 2% Cloths 1 Application(s) Topical <User Schedule>  dextrose 5%. 1000 milliLiter(s) IV Continuous <Continuous>  dextrose 50% Injectable 50 milliLiter(s) IV Push every 15 minutes  epoetin teena-epbx (RETACRIT) Injectable 5000 Unit(s) IV Push <User Schedule>  glucagon  Injectable 1 milliGRAM(s) IntraMuscular once  heparin   Injectable 5000 Unit(s) SubCutaneous every 8 hours  insulin lispro (ADMELOG) corrective regimen sliding scale   SubCutaneous every 4 hours  insulin NPH human recombinant 7 Unit(s) SubCutaneous every 6 hours  meropenem  IVPB 500 milliGRAM(s) IV Intermittent <User Schedule>  multivitamin/minerals/iron Oral Solution (CENTRUM) 15 milliLiter(s) Oral daily  pantoprazole  Injectable 40 milliGRAM(s) IV Push daily  senna 2 Tablet(s) Oral at bedtime  sevelamer carbonate Powder 800 milliGRAM(s) Enteral Tube two times a day  thiamine 100 milliGRAM(s) Enteral Tube daily    PRN Inpatient Medications  acetaminophen    Suspension .. 650 milliGRAM(s) Oral every 6 hours PRN  dextrose Oral Gel 15 Gram(s) Oral once PRN  simethicone 80 milliGRAM(s) Chew daily PRN  sodium chloride 0.9% lock flush 10 milliLiter(s) IV Push every 1 hour PRN      REVIEW OF SYSTEMS  --------------------------------------------------------------------------------  Gen: No  fevers/chills  Skin: No rashes  Head/Eyes/Ears/Mouth: No headache; Normal hearing; Normal vision w/o blurriness  Respiratory: No dyspnea, cough, wheezing, hemoptysis  CV: No chest pain, PND, orthopnea  GI: No abdominal pain, diarrhea, constipation, nausea, vomiting  : No increased frequency, dysuria, hematuria, nocturia  MSK: No joint pain/swelling; no back pain; no edema  Neuro: No dizziness/lightheadedness, weakness, seizures, numbness, tingling      All other systems were reviewed and are negative, except as noted.    VITALS/PHYSICAL EXAM  --------------------------------------------------------------------------------  T(C): 36.7 (01-01-23 @ 08:00), Max: 36.8 (01-01-23 @ 04:00)  HR: 98 (01-01-23 @ 08:24) (94 - 104)  BP: --  RR: 24 (01-01-23 @ 08:24) (6 - 35)  SpO2: 98% (01-01-23 @ 08:24) (95% - 100%)  Wt(kg): --        12-31-22 @ 07:01 - 01-01-23 @ 07:00  --------------------------------------------------------  IN: 2070 mL / OUT: 3165 mL / NET: -1095 mL    01-01-23 @ 07:01  -  01-01-23 @ 09:06  --------------------------------------------------------  IN: 65 mL / OUT: 45 mL / NET: 20 mL      Physical Exam:  	Gen: Sitting up in chair, comfortable. NGT in place  	HEENT: Anicteric  	Pulm:  trach+  	CV: S1S2+  	Abd: Soft, +BS, +abdominal pain LLQ      	Ext: 1+ LE edema B/L  	Neuro: Awake  	Skin: Warm and dry  	Vascular access: L. Femoral catheter        LABS/STUDIES  --------------------------------------------------------------------------------              9.9    13.27 >-----------<  105      [01-01-23 @ 00:40]              30.5     138  |  98  |  90  ----------------------------<  142      [01-01-23 @ 00:40]  4.2   |  24  |  4.56        Ca     8.3     [01-01-23 @ 00:40]      Mg     2.8     [01-01-23 @ 00:40]      Phos  6.5     [01-01-23 @ 00:40]    TPro  6.9  /  Alb  3.0  /  TBili  0.8  /  DBili  x   /  AST  29  /  ALT  19  /  AlkPhos  82  [01-01-23 @ 00:40]          Creatinine Trend:  SCr 4.56 [01-01 @ 00:40]  SCr 4.20 [12-31 @ 00:24]  SCr 5.36 [12-30 @ 00:33]  SCr 4.64 [12-29 @ 00:35]  SCr 5.50 [12-28 @ 00:40]    Urinalysis - [12-26-22 @ 13:19]      Color Yellow / Appearance Clear / SG 1.013 / pH 6.0      Gluc Negative / Ketone Negative  / Bili Negative / Urobili Negative       Blood Large / Protein 100 mg/dL / Leuk Est Moderate / Nitrite Negative      RBC >50 / WBC 35 / Hyaline 1 / Gran  / Sq Epi  / Non Sq Epi 2 / Bacteria Few      Iron 39, TIBC 222, %sat 18      [12-31-22 @ 06:41]  Ferritin 346      [12-31-22 @ 06:42]       [facility] Division of Kidney Diseases & Hypertension  FOLLOW UP NOTE  [phone]--------------------------------------------------------------------------------  Chief Complaint:Acute pancreatitis without infection or necrosis    63 y/o male with PMH of CABG, DM, HTN, HLD presenting as transfer from [place] for c/f STEMI. Course c/b gallstone pancreatitis leading to ARDS and shock & cardiac arrest now on [facility] ECMO. Had significant troponin elevation and his LVEF down to 8%. Pt was deemed to be too unstable to have an angiogram and potential PCI due to his co-morbidities & a new subdural bleed. Pt being seen for ERIC. SCr on arrival was 2.15; trended down to hector 1.6 on 11/25 and eventually increased to 3.95 11/28. Pt received IV diuretics as per CTU. Pt initiated on CRRT on 12/5/22 to optimize volume status and electrolytes.  Pt underwent decannulation of ECMO on 12/8/22. Pt was restarted on 12/9/22 for volume overload. Pt underwent mitral clip OR (12/16/22). s/p trach on 12/16     Overnight Events: Pt. seen this AM sitting up in a chair, comfortable. S/p UF session yesterday        PAST HISTORY  --------------------------------------------------------------------------------  No significant changes to PMH, PSH, FHx, SHx, unless otherwise noted    ALLERGIES & MEDICATIONS  --------------------------------------------------------------------------------  Allergies    No Known Allergies    Intolerances      Standing Inpatient Medications  aMIOdarone    Tablet 200 milliGRAM(s) Oral daily  aspirin  chewable 81 milliGRAM(s) Oral daily  atorvastatin 80 milliGRAM(s) Oral at bedtime  caspofungin IVPB 50 milliGRAM(s) IV Intermittent every 24 hours  caspofungin IVPB      chlorhexidine 0.12% Liquid 15 milliLiter(s) Oral Mucosa every 12 hours  chlorhexidine 2% Cloths 1 Application(s) Topical <User Schedule>  dextrose 5%. 1000 milliLiter(s) IV Continuous <Continuous>  dextrose 50% Injectable 50 milliLiter(s) IV Push every 15 minutes  epoetin teena-epbx (RETACRIT) Injectable 5000 Unit(s) IV Push <User Schedule>  glucagon  Injectable 1 milliGRAM(s) IntraMuscular once  heparin   Injectable 5000 Unit(s) SubCutaneous every 8 hours  insulin lispro (ADMELOG) corrective regimen sliding scale   SubCutaneous every 4 hours  insulin NPH human recombinant 7 Unit(s) SubCutaneous every 6 hours  meropenem  IVPB 500 milliGRAM(s) IV Intermittent <User Schedule>  multivitamin/minerals/iron Oral Solution (CENTRUM) 15 milliLiter(s) Oral daily  pantoprazole  Injectable 40 milliGRAM(s) IV Push daily  senna 2 Tablet(s) Oral at bedtime  sevelamer carbonate Powder 800 milliGRAM(s) Enteral Tube two times a day  thiamine 100 milliGRAM(s) Enteral Tube daily    PRN Inpatient Medications  acetaminophen    Suspension .. 650 milliGRAM(s) Oral every 6 hours PRN  dextrose Oral Gel 15 Gram(s) Oral once PRN  simethicone 80 milliGRAM(s) Chew daily PRN  sodium chloride 0.9% lock flush 10 milliLiter(s) IV Push every 1 hour PRN      REVIEW OF SYSTEMS  --------------------------------------------------------------------------------  Gen: No  fevers/chills  Skin: No rashes  Head/Eyes/Ears/Mouth: No headache; Normal hearing; Normal vision w/o blurriness  Respiratory: No dyspnea, cough, wheezing, hemoptysis  CV: No chest pain, PND, orthopnea  GI: No abdominal pain, diarrhea, constipation, nausea, vomiting  : No increased frequency, dysuria, hematuria, nocturia  MSK: No joint pain/swelling; no back pain; no edema  Neuro: No dizziness/lightheadedness, weakness, seizures, numbness, tingling      All other systems were reviewed and are negative, except as noted.    VITALS/PHYSICAL EXAM  --------------------------------------------------------------------------------  T(C): 36.7 (01-01-23 @ 08:00), Max: 36.8 (01-01-23 @ 04:00)  HR: 98 (01-01-23 @ 08:24) (94 - 104)  BP: --  RR: 24 (01-01-23 @ 08:24) (6 - 35)  SpO2: 98% (01-01-23 @ 08:24) (95% - 100%)  Wt(kg): --        12-31-22 @ 07:01 - 01-01-23 @ 07:00  --------------------------------------------------------  IN: 2070 mL / OUT: 3165 mL / NET: -1095 mL    01-01-23 @ 07:01  -  01-01-23 @ 09:06  --------------------------------------------------------  IN: 65 mL / OUT: 45 mL / NET: 20 mL      Physical Exam:  	Gen: Sitting up in chair, comfortable. NGT in place  	HEENT: Anicteric  	Pulm:  trach+  	CV: S1S2+  	Abd: Soft, +BS, +abdominal pain LLQ      	Ext: 1+ LE edema B/L  	Neuro: Awake  	Skin: Warm and dry  	Vascular access: L. Femoral catheter        LABS/STUDIES  --------------------------------------------------------------------------------              9.9    13.27 >-----------<  105      [01-01-23 @ 00:40]              30.5     138  |  98  |  90  ----------------------------<  142      [01-01-23 @ 00:40]  4.2   |  24  |  4.56        Ca     8.3     [01-01-23 @ 00:40]      Mg     2.8     [01-01-23 @ 00:40]      Phos  6.5     [01-01-23 @ 00:40]    TPro  6.9  /  Alb  3.0  /  TBili  0.8  /  DBili  x   /  AST  29  /  ALT  19  /  AlkPhos  82  [01-01-23 @ 00:40]          Creatinine Trend:  SCr 4.56 [01-01 @ 00:40]  SCr 4.20 [12-31 @ 00:24]  SCr 5.36 [12-30 @ 00:33]  SCr 4.64 [12-29 @ 00:35]  SCr 5.50 [12-28 @ 00:40]    Urinalysis - [12-26-22 @ 13:19]      Color Yellow / Appearance Clear / SG 1.013 / pH 6.0      Gluc Negative / Ketone Negative  / Bili Negative / Urobili Negative       Blood Large / Protein 100 mg/dL / Leuk Est Moderate / Nitrite Negative      RBC >50 / WBC 35 / Hyaline 1 / Gran  / Sq Epi  / Non Sq Epi 2 / Bacteria Few      Iron 39, TIBC 222, %sat 18      [12-31-22 @ 06:41]  Ferritin 346      [12-31-22 @ 06:42]       [facility] Division of Kidney Diseases & Hypertension  FOLLOW UP NOTE  [phone]--------------------------------------------------------------------------------  Chief Complaint:Acute pancreatitis without infection or necrosis    63 y/o male with PMH of CABG, DM, HTN, HLD presenting as transfer from [place] for c/f STEMI. Course c/b gallstone pancreatitis leading to ARDS and shock & cardiac arrest now on [facility] ECMO. Had significant troponin elevation and his LVEF down to 8%. Pt was deemed to be too unstable to have an angiogram and potential PCI due to his co-morbidities & a new subdural bleed. Pt being seen for ERIC. SCr on arrival was 2.15; trended down to hector 1.6 on 11/25 and eventually increased to 3.95 11/28. Pt received IV diuretics as per CTU. Pt initiated on CRRT on 12/5/22 to optimize volume status and electrolytes.  Pt underwent decannulation of ECMO on 12/8/22. Pt was restarted on 12/9/22 for volume overload. Pt underwent mitral clip OR (12/16/22). s/p trach on 12/16     Overnight Events: Pt. seen this AM sitting up in a chair, comfortable. S/p UF session yesterday          PAST HISTORY  --------------------------------------------------------------------------------  No significant changes to PMH, PSH, FHx, SHx, unless otherwise noted    ALLERGIES & MEDICATIONS  --------------------------------------------------------------------------------  Allergies    No Known Allergies    Intolerances      Standing Inpatient Medications  aMIOdarone    Tablet 200 milliGRAM(s) Oral daily  aspirin  chewable 81 milliGRAM(s) Oral daily  atorvastatin 80 milliGRAM(s) Oral at bedtime  caspofungin IVPB 50 milliGRAM(s) IV Intermittent every 24 hours  caspofungin IVPB      chlorhexidine 0.12% Liquid 15 milliLiter(s) Oral Mucosa every 12 hours  chlorhexidine 2% Cloths 1 Application(s) Topical <User Schedule>  dextrose 5%. 1000 milliLiter(s) IV Continuous <Continuous>  dextrose 50% Injectable 50 milliLiter(s) IV Push every 15 minutes  epoetin teena-epbx (RETACRIT) Injectable 5000 Unit(s) IV Push <User Schedule>  glucagon  Injectable 1 milliGRAM(s) IntraMuscular once  heparin   Injectable 5000 Unit(s) SubCutaneous every 8 hours  insulin lispro (ADMELOG) corrective regimen sliding scale   SubCutaneous every 4 hours  insulin NPH human recombinant 7 Unit(s) SubCutaneous every 6 hours  meropenem  IVPB 500 milliGRAM(s) IV Intermittent <User Schedule>  multivitamin/minerals/iron Oral Solution (CENTRUM) 15 milliLiter(s) Oral daily  pantoprazole  Injectable 40 milliGRAM(s) IV Push daily  senna 2 Tablet(s) Oral at bedtime  sevelamer carbonate Powder 800 milliGRAM(s) Enteral Tube two times a day  thiamine 100 milliGRAM(s) Enteral Tube daily    PRN Inpatient Medications  acetaminophen    Suspension .. 650 milliGRAM(s) Oral every 6 hours PRN  dextrose Oral Gel 15 Gram(s) Oral once PRN  simethicone 80 milliGRAM(s) Chew daily PRN  sodium chloride 0.9% lock flush 10 milliLiter(s) IV Push every 1 hour PRN      REVIEW OF SYSTEMS  --------------------------------------------------------------------------------  Gen: No  fevers/chills  Skin: No rashes  Head/Eyes/Ears/Mouth: No headache; Normal hearing; Normal vision w/o blurriness  Respiratory: No dyspnea, cough, wheezing, hemoptysis  CV: No chest pain, PND, orthopnea  GI: No abdominal pain, diarrhea, constipation, nausea, vomiting  : No increased frequency, dysuria, hematuria, nocturia  MSK: No joint pain/swelling; no back pain; no edema  Neuro: No dizziness/lightheadedness, weakness, seizures, numbness, tingling      All other systems were reviewed and are negative, except as noted.    VITALS/PHYSICAL EXAM  --------------------------------------------------------------------------------  T(C): 36.7 (01-01-23 @ 08:00), Max: 36.8 (01-01-23 @ 04:00)  HR: 98 (01-01-23 @ 08:24) (94 - 104)  BP: --  RR: 24 (01-01-23 @ 08:24) (6 - 35)  SpO2: 98% (01-01-23 @ 08:24) (95% - 100%)  Wt(kg): --        12-31-22 @ 07:01  -  01-01-23 @ 07:00  --------------------------------------------------------  IN: 2070 mL / OUT: 3165 mL / NET: -1095 mL    01-01-23 @ 07:01  -  01-01-23 @ 09:06  --------------------------------------------------------  IN: 65 mL / OUT: 45 mL / NET: 20 mL      Physical Exam:  	Gen: Sitting up in chair, comfortable. NGT in place  	HEENT: Anicteric  	Pulm:  trach+  	CV: S1S2+  	Abd: Soft, +BS, +abdominal pain LLQ      	Ext: 1+ LE edema B/L  	Neuro: Awake  	Skin: Warm and dry  	Vascular access: L. Femoral catheter        LABS/STUDIES  --------------------------------------------------------------------------------              9.9    13.27 >-----------<  105      [01-01-23 @ 00:40]              30.5     138  |  98  |  90  ----------------------------<  142      [01-01-23 @ 00:40]  4.2   |  24  |  4.56        Ca     8.3     [01-01-23 @ 00:40]      Mg     2.8     [01-01-23 @ 00:40]      Phos  6.5     [01-01-23 @ 00:40]    TPro  6.9  /  Alb  3.0  /  TBili  0.8  /  DBili  x   /  AST  29  /  ALT  19  /  AlkPhos  82  [01-01-23 @ 00:40]          Creatinine Trend:  SCr 4.56 [01-01 @ 00:40]  SCr 4.20 [12-31 @ 00:24]  SCr 5.36 [12-30 @ 00:33]  SCr 4.64 [12-29 @ 00:35]  SCr 5.50 [12-28 @ 00:40]    Urinalysis - [12-26-22 @ 13:19]      Color Yellow / Appearance Clear / SG 1.013 / pH 6.0      Gluc Negative / Ketone Negative  / Bili Negative / Urobili Negative       Blood Large / Protein 100 mg/dL / Leuk Est Moderate / Nitrite Negative      RBC >50 / WBC 35 / Hyaline 1 / Gran  / Sq Epi  / Non Sq Epi 2 / Bacteria Few      Iron 39, TIBC 222, %sat 18      [12-31-22 @ 06:41]  Ferritin 346      [12-31-22 @ 06:42]       [facility] Division of Kidney Diseases & Hypertension  FOLLOW UP NOTE  [phone]--------------------------------------------------------------------------------  Chief Complaint:Acute pancreatitis without infection or necrosis    63 y/o male with PMH of CABG, DM, HTN, HLD presenting as transfer from [place] for c/f STEMI. Course c/b gallstone pancreatitis leading to ARDS and shock & cardiac arrest now on [facility] ECMO. Had significant troponin elevation and his LVEF down to 8%. Pt was deemed to be too unstable to have an angiogram and potential PCI due to his co-morbidities & a new subdural bleed. Pt being seen for ERIC. SCr on arrival was 2.15; trended down to hector 1.6 on 11/25 and eventually increased to 3.95 11/28. Pt received IV diuretics as per CTU. Pt initiated on CRRT on 12/5/22 to optimize volume status and electrolytes.  Pt underwent decannulation of ECMO on 12/8/22. Pt was restarted on 12/9/22 for volume overload. Pt underwent mitral clip OR (12/16/22). s/p trach on 12/16     Overnight Events: Pt. seen this AM sitting up in a chair, comfortable. S/p UF session yesterday          PAST HISTORY  --------------------------------------------------------------------------------  No significant changes to PMH, PSH, FHx, SHx, unless otherwise noted    ALLERGIES & MEDICATIONS  --------------------------------------------------------------------------------  Allergies    No Known Allergies    Intolerances      Standing Inpatient Medications  aMIOdarone    Tablet 200 milliGRAM(s) Oral daily  aspirin  chewable 81 milliGRAM(s) Oral daily  atorvastatin 80 milliGRAM(s) Oral at bedtime  caspofungin IVPB 50 milliGRAM(s) IV Intermittent every 24 hours  caspofungin IVPB      chlorhexidine 0.12% Liquid 15 milliLiter(s) Oral Mucosa every 12 hours  chlorhexidine 2% Cloths 1 Application(s) Topical <User Schedule>  dextrose 5%. 1000 milliLiter(s) IV Continuous <Continuous>  dextrose 50% Injectable 50 milliLiter(s) IV Push every 15 minutes  epoetin teena-epbx (RETACRIT) Injectable 5000 Unit(s) IV Push <User Schedule>  glucagon  Injectable 1 milliGRAM(s) IntraMuscular once  heparin   Injectable 5000 Unit(s) SubCutaneous every 8 hours  insulin lispro (ADMELOG) corrective regimen sliding scale   SubCutaneous every 4 hours  insulin NPH human recombinant 7 Unit(s) SubCutaneous every 6 hours  meropenem  IVPB 500 milliGRAM(s) IV Intermittent <User Schedule>  multivitamin/minerals/iron Oral Solution (CENTRUM) 15 milliLiter(s) Oral daily  pantoprazole  Injectable 40 milliGRAM(s) IV Push daily  senna 2 Tablet(s) Oral at bedtime  sevelamer carbonate Powder 800 milliGRAM(s) Enteral Tube two times a day  thiamine 100 milliGRAM(s) Enteral Tube daily    PRN Inpatient Medications  acetaminophen    Suspension .. 650 milliGRAM(s) Oral every 6 hours PRN  dextrose Oral Gel 15 Gram(s) Oral once PRN  simethicone 80 milliGRAM(s) Chew daily PRN  sodium chloride 0.9% lock flush 10 milliLiter(s) IV Push every 1 hour PRN      REVIEW OF SYSTEMS  --------------------------------------------------------------------------------  Gen: No  fevers/chills  Skin: No rashes  Head/Eyes/Ears/Mouth: No headache; Normal hearing; Normal vision w/o blurriness  Respiratory: No dyspnea, cough, wheezing, hemoptysis  CV: No chest pain, PND, orthopnea  GI: No abdominal pain, diarrhea, constipation, nausea, vomiting  : No increased frequency, dysuria, hematuria, nocturia  MSK: No joint pain/swelling; no back pain; no edema  Neuro: No dizziness/lightheadedness, weakness, seizures, numbness, tingling      All other systems were reviewed and are negative, except as noted.    VITALS/PHYSICAL EXAM  --------------------------------------------------------------------------------  T(C): 36.7 (01-01-23 @ 08:00), Max: 36.8 (01-01-23 @ 04:00)  HR: 98 (01-01-23 @ 08:24) (94 - 104)  BP: --  RR: 24 (01-01-23 @ 08:24) (6 - 35)  SpO2: 98% (01-01-23 @ 08:24) (95% - 100%)  Wt(kg): --        12-31-22 @ 07:01  -  01-01-23 @ 07:00  --------------------------------------------------------  IN: 2070 mL / OUT: 3165 mL / NET: -1095 mL    01-01-23 @ 07:01  -  01-01-23 @ 09:06  --------------------------------------------------------  IN: 65 mL / OUT: 45 mL / NET: 20 mL      Physical Exam:  	Gen: Sitting up in chair, comfortable. NGT in place  	HEENT: Anicteric  	Pulm:  trach+  	CV: S1S2+  	Abd: Soft, +BS, +abdominal pain LLQ      	Ext: 1+ LE edema B/L  	Neuro: Awake  	Skin: Warm and dry  	Vascular access: L. Femoral catheter        LABS/STUDIES  --------------------------------------------------------------------------------              9.9    13.27 >-----------<  105      [01-01-23 @ 00:40]              30.5     138  |  98  |  90  ----------------------------<  142      [01-01-23 @ 00:40]  4.2   |  24  |  4.56        Ca     8.3     [01-01-23 @ 00:40]      Mg     2.8     [01-01-23 @ 00:40]      Phos  6.5     [01-01-23 @ 00:40]    TPro  6.9  /  Alb  3.0  /  TBili  0.8  /  DBili  x   /  AST  29  /  ALT  19  /  AlkPhos  82  [01-01-23 @ 00:40]          Creatinine Trend:  SCr 4.56 [01-01 @ 00:40]  SCr 4.20 [12-31 @ 00:24]  SCr 5.36 [12-30 @ 00:33]  SCr 4.64 [12-29 @ 00:35]  SCr 5.50 [12-28 @ 00:40]    Urinalysis - [12-26-22 @ 13:19]      Color Yellow / Appearance Clear / SG 1.013 / pH 6.0      Gluc Negative / Ketone Negative  / Bili Negative / Urobili Negative       Blood Large / Protein 100 mg/dL / Leuk Est Moderate / Nitrite Negative      RBC >50 / WBC 35 / Hyaline 1 / Gran  / Sq Epi  / Non Sq Epi 2 / Bacteria Few      Iron 39, TIBC 222, %sat 18      [12-31-22 @ 06:41]  Ferritin 346      [12-31-22 @ 06:42]       [facility] Division of Kidney Diseases & Hypertension  FOLLOW UP NOTE  [phone]--------------------------------------------------------------------------------  Chief Complaint:Acute pancreatitis without infection or necrosis    63 y/o male with PMH of CABG, DM, HTN, HLD presenting as transfer from [facility] for c/f STEMI. Course c/b gallstone pancreatitis leading to ARDS and shock & cardiac arrest now on [facility] ECMO. Had significant troponin elevation and his LVEF down to 8%. Pt was deemed to be too unstable to have an angiogram and potential PCI due to his co-morbidities & a new subdural bleed. Pt being seen for ERIC. SCr on arrival was 2.15; trended down to hector 1.6 on 11/25 and eventually increased to 3.95 11/28. Pt received IV diuretics as per CTU. Pt initiated on CRRT on 12/5/22 to optimize volume status and electrolytes.  Pt underwent decannulation of ECMO on 12/8/22. Pt was restarted on 12/9/22 for volume overload. Pt underwent mitral clip OR (12/16/22). s/p trach on 12/16     Overnight Events: Pt. seen this AM sitting up in a chair, comfortable. S/p UF session yesterday          PAST HISTORY  --------------------------------------------------------------------------------  No significant changes to PMH, PSH, FHx, SHx, unless otherwise noted    ALLERGIES & MEDICATIONS  --------------------------------------------------------------------------------  Allergies    No Known Allergies    Intolerances      Standing Inpatient Medications  aMIOdarone    Tablet 200 milliGRAM(s) Oral daily  aspirin  chewable 81 milliGRAM(s) Oral daily  atorvastatin 80 milliGRAM(s) Oral at bedtime  caspofungin IVPB 50 milliGRAM(s) IV Intermittent every 24 hours  caspofungin IVPB      chlorhexidine 0.12% Liquid 15 milliLiter(s) Oral Mucosa every 12 hours  chlorhexidine 2% Cloths 1 Application(s) Topical <User Schedule>  dextrose 5%. 1000 milliLiter(s) IV Continuous <Continuous>  dextrose 50% Injectable 50 milliLiter(s) IV Push every 15 minutes  epoetin teena-epbx (RETACRIT) Injectable 5000 Unit(s) IV Push <User Schedule>  glucagon  Injectable 1 milliGRAM(s) IntraMuscular once  heparin   Injectable 5000 Unit(s) SubCutaneous every 8 hours  insulin lispro (ADMELOG) corrective regimen sliding scale   SubCutaneous every 4 hours  insulin NPH human recombinant 7 Unit(s) SubCutaneous every 6 hours  meropenem  IVPB 500 milliGRAM(s) IV Intermittent <User Schedule>  multivitamin/minerals/iron Oral Solution (CENTRUM) 15 milliLiter(s) Oral daily  pantoprazole  Injectable 40 milliGRAM(s) IV Push daily  senna 2 Tablet(s) Oral at bedtime  sevelamer carbonate Powder 800 milliGRAM(s) Enteral Tube two times a day  thiamine 100 milliGRAM(s) Enteral Tube daily    PRN Inpatient Medications  acetaminophen    Suspension .. 650 milliGRAM(s) Oral every 6 hours PRN  dextrose Oral Gel 15 Gram(s) Oral once PRN  simethicone 80 milliGRAM(s) Chew daily PRN  sodium chloride 0.9% lock flush 10 milliLiter(s) IV Push every 1 hour PRN      REVIEW OF SYSTEMS  --------------------------------------------------------------------------------  Gen: No  fevers/chills  Skin: No rashes  Head/Eyes/Ears/Mouth: No headache; Normal hearing; Normal vision w/o blurriness  Respiratory: No dyspnea, cough, wheezing, hemoptysis  CV: No chest pain, PND, orthopnea  GI: No abdominal pain, diarrhea, constipation, nausea, vomiting  : No increased frequency, dysuria, hematuria, nocturia  MSK: No joint pain/swelling; no back pain; no edema  Neuro: No dizziness/lightheadedness, weakness, seizures, numbness, tingling      All other systems were reviewed and are negative, except as noted.    VITALS/PHYSICAL EXAM  --------------------------------------------------------------------------------  T(C): 36.7 (01-01-23 @ 08:00), Max: 36.8 (01-01-23 @ 04:00)  HR: 98 (01-01-23 @ 08:24) (94 - 104)  BP: --  RR: 24 (01-01-23 @ 08:24) (6 - 35)  SpO2: 98% (01-01-23 @ 08:24) (95% - 100%)  Wt(kg): --        12-31-22 @ 07:01  -  01-01-23 @ 07:00  --------------------------------------------------------  IN: 2070 mL / OUT: 3165 mL / NET: -1095 mL    01-01-23 @ 07:01  -  01-01-23 @ 09:06  --------------------------------------------------------  IN: 65 mL / OUT: 45 mL / NET: 20 mL      Physical Exam:  	Gen: Sitting up in chair, comfortable. NGT in place  	HEENT: Anicteric  	Pulm:  trach+  	CV: S1S2+  	Abd: Soft, +BS, +abdominal pain LLQ      	Ext: 1+ LE edema B/L  	Neuro: Awake  	Skin: Warm and dry  	Vascular access: L. Femoral catheter        LABS/STUDIES  --------------------------------------------------------------------------------              9.9    13.27 >-----------<  105      [01-01-23 @ 00:40]              30.5     138  |  98  |  90  ----------------------------<  142      [01-01-23 @ 00:40]  4.2   |  24  |  4.56        Ca     8.3     [01-01-23 @ 00:40]      Mg     2.8     [01-01-23 @ 00:40]      Phos  6.5     [01-01-23 @ 00:40]    TPro  6.9  /  Alb  3.0  /  TBili  0.8  /  DBili  x   /  AST  29  /  ALT  19  /  AlkPhos  82  [01-01-23 @ 00:40]          Creatinine Trend:  SCr 4.56 [01-01 @ 00:40]  SCr 4.20 [12-31 @ 00:24]  SCr 5.36 [12-30 @ 00:33]  SCr 4.64 [12-29 @ 00:35]  SCr 5.50 [12-28 @ 00:40]    Urinalysis - [12-26-22 @ 13:19]      Color Yellow / Appearance Clear / SG 1.013 / pH 6.0      Gluc Negative / Ketone Negative  / Bili Negative / Urobili Negative       Blood Large / Protein 100 mg/dL / Leuk Est Moderate / Nitrite Negative      RBC >50 / WBC 35 / Hyaline 1 / Gran  / Sq Epi  / Non Sq Epi 2 / Bacteria Few      Iron 39, TIBC 222, %sat 18      [12-31-22 @ 06:41]  Ferritin 346      [12-31-22 @ 06:42]

## 2023-01-01 NOTE — PROGRESS NOTE ADULT - ENT GEN HX ROS MEA POS PC
HPI: Pediatric General


Time Seen by Provider: 19 22:12


Chief Complaint (Nursing): Fever


History Per: Patient,  (Alanyce [id number])


History/Exam Limitations: no limitations


Onset/Duration Of Symptoms: Days


Current Symptoms Are (Timing): Still Present


Additional Complaint(s): 





4 year old F brought in by mother for evaluation of "high fever".  Mothers 

states she has no other symptoms- no runny nose, cough, nausea, vomiting, 

congestion, earache, throat pain, or any other symptoms.  Mother states that she

only wants to drink water today but does not want to eat food.  Mother gave 1 

teaspoon of ibuprofen at 3-4PM.  





PMD: Dr. Ngoc Mello





Past Medical History


Reviewed: Historical Data, Nursing Documentation, Vital Signs


Vital Signs: 





                                Last Vital Signs











Temp  103.3 F H  19 22:56


 


Pulse  126 H  19 22:02


 


Resp  26   19 22:02


 


BP  116/77 H  19 22:02


 


Pulse Ox  100   19 22:02














- Medical History


PMH: No Chronic Diseases





- Family History


Family History: States: Unknown Family Hx





- Home Medications


Home Medications: 


                                Ambulatory Orders











 Medication  Instructions  Recorded


 


Albuterol 0.083% [Albuterol 1 vial IH TID PRN #30 vial 18





Sulfate 3 Ml]  


 


Mask, Face [Nebulizer Aerosol Mask 1 dev XX PRN PRN #1 dev 18





Pediatric]  


 


Nebulizer [Compact Compressor 1 dev XX Q6 PRN #1 dev 18





Nebulizer]  


 


Acetaminophen 330 mg PO Q4 #1 liquid 19


 


Ibuprofen 220 mg PO Q6 #1 oral.susp 19














- Allergies


Allergies/Adverse Reactions: 


                                    Allergies











Allergy/AdvReac Type Severity Reaction Status Date / Time


 


No Known Allergies Allergy   Verified 18 21:44














Review of Systems


ROS Statement: Except As Marked, All Systems Reviewed And Found Negative


Constitutional: Positive for: Fever





Physical Exam





- Reviewed


Nursing Documentation Reviewed: Yes


Vital Signs Reviewed: Yes





- Physical Exam


Appears: Positive for: Well, Non-toxic, No Acute Distress


Head Exam: Positive for: ATRAUMATIC, NORMAL INSPECTION, NORMOCEPHALIC


Skin: Positive for: Normal Color, Warm, DRY


Eye Exam: Positive for: EOMI, Normal appearance, PERRL


ENT: Positive for: Normal ENT Inspection


Neck: Positive for: Normal, Painless ROM


Cardiovascular/Chest: Positive for: Regular Rate, Rhythm


Respiratory: Positive for: CNT, Normal Breath Sounds


Gastrointestinal/Abdominal: Positive for: Normal Exam, Soft


Back: Positive for: Normal Inspection


Extremity: Positive for: Normal ROM


Neurologic/Psych: Positive for: Alert (active, alert, well appearing), Oriented





- ECG


O2 Sat by Pulse Oximetry: 100


Pulse Ox Interpretation: Normal





Medical Decision Making


Medical Decision MakinPM


Child brought by mother for evaluation of fever


--Well appearing child with fever without any signs or symptoms of invasive 

infection


--Advised mother that she is under-dosing medication


--Child without flu symptoms, most likely viral illness





100AM


--Fever reducing, HR improving


--Advised followup with PMD


--Well appearing upon discharge





Disposition





- Clinical Impression


Clinical Impression: 


 Fever








- Disposition


Referrals: 


Ngoc Mello MD [Family Provider] - 


Disposition: Routine/Home


Disposition Time: 01:00


Condition: STABLE


Prescriptions: 


Acetaminophen 330 mg PO Q4 #1 liquid


Ibuprofen 220 mg PO Q6 #1 oral.susp


Instructions:  Fever, Children Older Than 3 Years of Age (DC)


Forms:  [employer] [facility] (English)


Print Language: Luxembour[place]
dry mouth

## 2023-01-01 NOTE — PROGRESS NOTE ADULT - SUBJECTIVE AND OBJECTIVE BOX
EDUARDO REAL 62y [id number]    Patient is a 62y old  Male who presents with a chief complaint of Acute cholecystitis, gallstone pancreatitis (01 Jan 2023 06:21)      Follow Up/CC:  ID following for fungemia    Interval History/ROS: in chair, awake, no fever    Allergies    No Known Allergies    Intolerances        ANTIMICROBIALS:  caspofungin IVPB 50 every 24 hours  caspofungin IVPB    meropenem  IVPB 500 <User Schedule>      MEDICATIONS  (STANDING):  aMIOdarone    Tablet 200 milliGRAM(s) Oral daily  aspirin  chewable 81 milliGRAM(s) Oral daily  atorvastatin 80 milliGRAM(s) Oral at bedtime  caspofungin IVPB 50 milliGRAM(s) IV Intermittent every 24 hours  caspofungin IVPB      chlorhexidine 0.12% Liquid 15 milliLiter(s) Oral Mucosa every 12 hours  chlorhexidine 2% Cloths 1 Application(s) Topical <User Schedule>  dextrose 5%. 1000 milliLiter(s) (50 mL/Hr) IV Continuous <Continuous>  dextrose 50% Injectable 50 milliLiter(s) IV Push every 15 minutes  epoetin teena-epbx (RETACRIT) Injectable 5000 Unit(s) IV Push <User Schedule>  glucagon  Injectable 1 milliGRAM(s) IntraMuscular once  heparin   Injectable 5000 Unit(s) SubCutaneous every 8 hours  insulin lispro (ADMELOG) corrective regimen sliding scale   SubCutaneous every 4 hours  insulin NPH human recombinant 7 Unit(s) SubCutaneous every 6 hours  meropenem  IVPB 500 milliGRAM(s) IV Intermittent <User Schedule>  multivitamin/minerals/iron Oral Solution (CENTRUM) 15 milliLiter(s) Oral daily  pantoprazole  Injectable 40 milliGRAM(s) IV Push daily  senna 2 Tablet(s) Oral at bedtime  sevelamer carbonate Powder 800 milliGRAM(s) Enteral Tube two times a day  thiamine 100 milliGRAM(s) Enteral Tube daily    MEDICATIONS  (PRN):  acetaminophen    Suspension .. 650 milliGRAM(s) Oral every 6 hours PRN Temp greater or equal to 38.5C (101.3F)  dextrose Oral Gel 15 Gram(s) Oral once PRN Blood Glucose LESS THAN 70 milliGRAM(s)/deciliter  simethicone 80 milliGRAM(s) Chew daily PRN Gas  sodium chloride 0.9% lock flush 10 milliLiter(s) IV Push every 1 hour PRN Pre/post blood products, medications, blood draw, and to maintain line patency        Vital Signs Last 24 Hrs  T(C): 36.7 (01 Jan 2023 08:00), Max: 36.8 (01 Jan 2023 04:00)  T(F): 98.1 (01 Jan 2023 08:00), Max: 98.3 (01 Jan 2023 04:00)  HR: 98 (01 Jan 2023 08:24) (94 - 104)  BP: --  BP(mean): --  RR: 24 (01 Jan 2023 08:24) (6 - 35)  SpO2: 98% (01 Jan 2023 08:24) (95% - 100%)    Parameters below as of 01 Jan 2023 08:24  Patient On (Oxygen Delivery Method): tracheostomy collar  O2 Flow (L/min): 10  O2 Concentration (%): 40    CBC Full  -  ( 01 Jan 2023 00:40 )  WBC Count : 13.27 K/uL  RBC Count : 3.29 M/uL  Hemoglobin : 9.9 g/dL  Hematocrit : 30.5 %  Platelet Count - Automated : 105 K/uL  Mean Cell Volume : 92.7 fl  Mean Cell Hemoglobin : 30.1 pg  Mean Cell Hemoglobin Concentration : 32.5 gm/dL  Auto Neutrophil # : x  Auto Lymphocyte # : x  Auto Monocyte # : x  Auto Eosinophil # : x  Auto Basophil # : x  Auto Neutrophil % : x  Auto Lymphocyte % : x  Auto Monocyte % : x  Auto Eosinophil % : x  Auto Basophil % : x    01-01    138  |  98  |  90<H>  ----------------------------<  142<H>  4.2   |  24  |  4.56<H>    Ca    8.3<L>      01 Jan 2023 00:40  Phos  6.5     01-01  Mg     2.8     01-01    TPro  6.9  /  Alb  3.0<L>  /  TBili  0.8  /  DBili  x   /  AST  29  /  ALT  19  /  AlkPhos  82  01-01    LIVER FUNCTIONS - ( 01 Jan 2023 00:40 )  Alb: 3.0 g/dL / Pro: 6.9 g/dL / ALK PHOS: 82 U/L / ALT: 19 U/L / AST: 29 U/L / GGT: x               MICROBIOLOGY:  Clean Catch Clean Catch (Midstream)  12-30-22   <10,000 CFU/mL Normal Urogenital Christy  --  --      .Blood Blood-Peripheral  12-29-22   No growth to date.  --  --      .Blood Blood-Peripheral  12-27-22   No growth to date.  --  --      .Blood Blood-Peripheral  12-27-22   No growth to date.  --  --      Trach Asp Tracheal Aspirate  12-26-22   Normal Respiratory Christy present  --    No polymorphonuclear leukocytes per low power field  Few Squamous epithelial cells per low power field  Moderate Gram Positive Rods per oil power field      .Blood Blood  12-26-22   No Growth Final  --  --      .Blood Blood-Peripheral  12-26-22   No Growth Final  --  --      .Blood Blood-Peripheral  12-26-22   Growth in aerobic bottle: Candida tropicalis  ***Blood Panel PCR results on this specimen are available  approximately 3 hours after the Gram stain result.***  Gram stain, PCR, and/or culture results may not always  correspond due to difference in methodologies.  ************************************************************  This PCR assay was performed by multiplex PCR. This  Assay tests for 66 bacterial and resistance gene targets.  Please refer to the [facility] test directory  at [email or web address] for details.  --  Blood Culture PCR  Candida tropicalis      Trach Asp Tracheal Aspirate  12-19-22   Normal Respiratory Christy present  --    Few polymorphonuclear leukocytes per low power field  Rare Squamous epithelial cells per low power field  Few Yeast like cells per oil power field  Rare Gram Positive Rods per oil power field      .Blood Blood-Peripheral  12-18-22   No Growth Final  --  --      .Sputum Sputum  12-15-22   Normal Respiratory Christy present  --    Few polymorphonuclear leukocytes per low power field  Rare Squamous epithelial cells per low power field  Rare Yeast per oil power field      .Blood Blood-Peripheral  12-15-22   No Growth Final  --  --      .Blood Blood-Peripheral  12-15-22   No Growth Final  --  --      Catheterized Catheterized  12-11-22   No growth  --  --      .Blood Blood-Peripheral  12-10-22   No Growth Final  --  --      .Blood Blood-Peripheral  12-10-22   No Growth Final  --  --      ET Tube ET Tube  12-10-22   Normal Respiratory Christy present  --    Moderate polymorphonuclear leukocytes per low power field  No Squamous epithelial cells per low power field  Few Yeast like cells per oil power field      ET Tube ET Tube  12-09-22   Normal Respiratory Christy present  --    Numerous polymorphonuclear leukocytes per low power field  Moderate Squamous epithelial cells per low power field  Few Yeast like cells  per oil power field      .Blood Blood-Peripheral  12-08-22   No Growth Final  --  --      .Blood Blood-Peripheral  12-08-22   No Growth Final  --  --      .Blood Blood  12-03-22   No Growth Final  --  --      ET Tube ET Tube  12-03-22   Normal Respiratory Christy present  --    Rare polymorphonuclear leukocytes per low power field  No Squamous epithelial cells per low power field  No organisms seen per oil power field      .Blood Blood  12-03-22   No Growth Final  --  --        RADIOLOGY    < from: Xray Chest 1 View- PORTABLE-Urgent (Xray Chest 1 View- PORTABLE-Urgent .) (12.31.22 @ 06:53) >  Interval decrease of bilateral patchy opacities.    < end of copied text >   EDUARDO REAL 62y [id number]    Patient is a 62y old  Male who presents with a chief complaint of Acute cholecystitis, gallstone pancreatitis (01 Jan 2023 06:21)      Follow Up/CC:  ID following for fungemia    Interval History/ROS: in chair, awake, no fever    Allergies    No Known Allergies    Intolerances        ANTIMICROBIALS:  caspofungin IVPB 50 every 24 hours  caspofungin IVPB    meropenem  IVPB 500 <User Schedule>      MEDICATIONS  (STANDING):  aMIOdarone    Tablet 200 milliGRAM(s) Oral daily  aspirin  chewable 81 milliGRAM(s) Oral daily  atorvastatin 80 milliGRAM(s) Oral at bedtime  caspofungin IVPB 50 milliGRAM(s) IV Intermittent every 24 hours  caspofungin IVPB      chlorhexidine 0.12% Liquid 15 milliLiter(s) Oral Mucosa every 12 hours  chlorhexidine 2% Cloths 1 Application(s) Topical <User Schedule>  dextrose 5%. 1000 milliLiter(s) (50 mL/Hr) IV Continuous <Continuous>  dextrose 50% Injectable 50 milliLiter(s) IV Push every 15 minutes  epoetin teena-epbx (RETACRIT) Injectable 5000 Unit(s) IV Push <User Schedule>  glucagon  Injectable 1 milliGRAM(s) IntraMuscular once  heparin   Injectable 5000 Unit(s) SubCutaneous every 8 hours  insulin lispro (ADMELOG) corrective regimen sliding scale   SubCutaneous every 4 hours  insulin NPH human recombinant 7 Unit(s) SubCutaneous every 6 hours  meropenem  IVPB 500 milliGRAM(s) IV Intermittent <User Schedule>  multivitamin/minerals/iron Oral Solution (CENTRUM) 15 milliLiter(s) Oral daily  pantoprazole  Injectable 40 milliGRAM(s) IV Push daily  senna 2 Tablet(s) Oral at bedtime  sevelamer carbonate Powder 800 milliGRAM(s) Enteral Tube two times a day  thiamine 100 milliGRAM(s) Enteral Tube daily    MEDICATIONS  (PRN):  acetaminophen    Suspension .. 650 milliGRAM(s) Oral every 6 hours PRN Temp greater or equal to 38.5C (101.3F)  dextrose Oral Gel 15 Gram(s) Oral once PRN Blood Glucose LESS THAN 70 milliGRAM(s)/deciliter  simethicone 80 milliGRAM(s) Chew daily PRN Gas  sodium chloride 0.9% lock flush 10 milliLiter(s) IV Push every 1 hour PRN Pre/post blood products, medications, blood draw, and to maintain line patency        Vital Signs Last 24 Hrs  T(C): 36.7 (01 Jan 2023 08:00), Max: 36.8 (01 Jan 2023 04:00)  T(F): 98.1 (01 Jan 2023 08:00), Max: 98.3 (01 Jan 2023 04:00)  HR: 98 (01 Jan 2023 08:24) (94 - 104)  BP: --  BP(mean): --  RR: 24 (01 Jan 2023 08:24) (6 - 35)  SpO2: 98% (01 Jan 2023 08:24) (95% - 100%)    Parameters below as of 01 Jan 2023 08:24  Patient On (Oxygen Delivery Method): tracheostomy collar  O2 Flow (L/min): 10  O2 Concentration (%): 40    CBC Full  -  ( 01 Jan 2023 00:40 )  WBC Count : 13.27 K/uL  RBC Count : 3.29 M/uL  Hemoglobin : 9.9 g/dL  Hematocrit : 30.5 %  Platelet Count - Automated : 105 K/uL  Mean Cell Volume : 92.7 fl  Mean Cell Hemoglobin : 30.1 pg  Mean Cell Hemoglobin Concentration : 32.5 gm/dL  Auto Neutrophil # : x  Auto Lymphocyte # : x  Auto Monocyte # : x  Auto Eosinophil # : x  Auto Basophil # : x  Auto Neutrophil % : x  Auto Lymphocyte % : x  Auto Monocyte % : x  Auto Eosinophil % : x  Auto Basophil % : x    01-01    138  |  98  |  90<H>  ----------------------------<  142<H>  4.2   |  24  |  4.56<H>    Ca    8.3<L>      01 Jan 2023 00:40  Phos  6.5     01-01  Mg     2.8     01-01    TPro  6.9  /  Alb  3.0<L>  /  TBili  0.8  /  DBili  x   /  AST  29  /  ALT  19  /  AlkPhos  82  01-01    LIVER FUNCTIONS - ( 01 Jan 2023 00:40 )  Alb: 3.0 g/dL / Pro: 6.9 g/dL / ALK PHOS: 82 U/L / ALT: 19 U/L / AST: 29 U/L / GGT: x               MICROBIOLOGY:  Clean Catch Clean Catch (Midstream)  12-30-22   <10,000 CFU/mL Normal Urogenital Christy  --  --      .Blood Blood-Peripheral  12-29-22   No growth to date.  --  --      .Blood Blood-Peripheral  12-27-22   No growth to date.  --  --      .Blood Blood-Peripheral  12-27-22   No growth to date.  --  --      Trach Asp Tracheal Aspirate  12-26-22   Normal Respiratory Christy present  --    No polymorphonuclear leukocytes per low power field  Few Squamous epithelial cells per low power field  Moderate Gram Positive Rods per oil power field      .Blood Blood  12-26-22   No Growth Final  --  --      .Blood Blood-Peripheral  12-26-22   No Growth Final  --  --      .Blood Blood-Peripheral  12-26-22   Growth in aerobic bottle: Candida tropicalis  ***Blood Panel PCR results on this specimen are available  approximately 3 hours after the Gram stain result.***  Gram stain, PCR, and/or culture results may not always  correspond due to difference in methodologies.  ************************************************************  This PCR assay was performed by multiplex PCR. This  Assay tests for 66 bacterial and resistance gene targets.  Please refer to the [facility] Labs test directory  at [email or web address] for details.  --  Blood Culture PCR  Candida tropicalis      Trach Asp Tracheal Aspirate  12-19-22   Normal Respiratory Christy present  --    Few polymorphonuclear leukocytes per low power field  Rare Squamous epithelial cells per low power field  Few Yeast like cells per oil power field  Rare Gram Positive Rods per oil power field      .Blood Blood-Peripheral  12-18-22   No Growth Final  --  --      .Sputum Sputum  12-15-22   Normal Respiratory Christy present  --    Few polymorphonuclear leukocytes per low power field  Rare Squamous epithelial cells per low power field  Rare Yeast per oil power field      .Blood Blood-Peripheral  12-15-22   No Growth Final  --  --      .Blood Blood-Peripheral  12-15-22   No Growth Final  --  --      Catheterized Catheterized  12-11-22   No growth  --  --      .Blood Blood-Peripheral  12-10-22   No Growth Final  --  --      .Blood Blood-Peripheral  12-10-22   No Growth Final  --  --      ET Tube ET Tube  12-10-22   Normal Respiratory Christy present  --    Moderate polymorphonuclear leukocytes per low power field  No Squamous epithelial cells per low power field  Few Yeast like cells per oil power field      ET Tube ET Tube  12-09-22   Normal Respiratory Christy present  --    Numerous polymorphonuclear leukocytes per low power field  Moderate Squamous epithelial cells per low power field  Few Yeast like cells  per oil power field      .Blood Blood-Peripheral  12-08-22   No Growth Final  --  --      .Blood Blood-Peripheral  12-08-22   No Growth Final  --  --      .Blood Blood  12-03-22   No Growth Final  --  --      ET Tube ET Tube  12-03-22   Normal Respiratory Christy present  --    Rare polymorphonuclear leukocytes per low power field  No Squamous epithelial cells per low power field  No organisms seen per oil power field      .Blood Blood  12-03-22   No Growth Final  --  --        RADIOLOGY    < from: Xray Chest 1 View- PORTABLE-Urgent (Xray Chest 1 View- PORTABLE-Urgent .) (12.31.22 @ 06:53) >  Interval decrease of bilateral patchy opacities.    < end of copied text >   EDUARDO REAL 62y [id number]    Patient is a 62y old  Male who presents with a chief complaint of Acute cholecystitis, gallstone pancreatitis (01 Jan 2023 06:21)      Follow Up/CC:  ID following for fungemia    Interval History/ROS: in chair, awake, no fever    Allergies    No Known Allergies    Intolerances        ANTIMICROBIALS:  caspofungin IVPB 50 every 24 hours  caspofungin IVPB    meropenem  IVPB 500 <User Schedule>      MEDICATIONS  (STANDING):  aMIOdarone    Tablet 200 milliGRAM(s) Oral daily  aspirin  chewable 81 milliGRAM(s) Oral daily  atorvastatin 80 milliGRAM(s) Oral at bedtime  caspofungin IVPB 50 milliGRAM(s) IV Intermittent every 24 hours  caspofungin IVPB      chlorhexidine 0.12% Liquid 15 milliLiter(s) Oral Mucosa every 12 hours  chlorhexidine 2% Cloths 1 Application(s) Topical <User Schedule>  dextrose 5%. 1000 milliLiter(s) (50 mL/Hr) IV Continuous <Continuous>  dextrose 50% Injectable 50 milliLiter(s) IV Push every 15 minutes  epoetin teena-epbx (RETACRIT) Injectable 5000 Unit(s) IV Push <User Schedule>  glucagon  Injectable 1 milliGRAM(s) IntraMuscular once  heparin   Injectable 5000 Unit(s) SubCutaneous every 8 hours  insulin lispro (ADMELOG) corrective regimen sliding scale   SubCutaneous every 4 hours  insulin NPH human recombinant 7 Unit(s) SubCutaneous every 6 hours  meropenem  IVPB 500 milliGRAM(s) IV Intermittent <User Schedule>  multivitamin/minerals/iron Oral Solution (CENTRUM) 15 milliLiter(s) Oral daily  pantoprazole  Injectable 40 milliGRAM(s) IV Push daily  senna 2 Tablet(s) Oral at bedtime  sevelamer carbonate Powder 800 milliGRAM(s) Enteral Tube two times a day  thiamine 100 milliGRAM(s) Enteral Tube daily    MEDICATIONS  (PRN):  acetaminophen    Suspension .. 650 milliGRAM(s) Oral every 6 hours PRN Temp greater or equal to 38.5C (101.3F)  dextrose Oral Gel 15 Gram(s) Oral once PRN Blood Glucose LESS THAN 70 milliGRAM(s)/deciliter  simethicone 80 milliGRAM(s) Chew daily PRN Gas  sodium chloride 0.9% lock flush 10 milliLiter(s) IV Push every 1 hour PRN Pre/post blood products, medications, blood draw, and to maintain line patency        Vital Signs Last 24 Hrs  T(C): 36.7 (01 Jan 2023 08:00), Max: 36.8 (01 Jan 2023 04:00)  T(F): 98.1 (01 Jan 2023 08:00), Max: 98.3 (01 Jan 2023 04:00)  HR: 98 (01 Jan 2023 08:24) (94 - 104)  BP: --  BP(mean): --  RR: 24 (01 Jan 2023 08:24) (6 - 35)  SpO2: 98% (01 Jan 2023 08:24) (95% - 100%)    Parameters below as of 01 Jan 2023 08:24  Patient On (Oxygen Delivery Method): tracheostomy collar  O2 Flow (L/min): 10  O2 Concentration (%): 40    CBC Full  -  ( 01 Jan 2023 00:40 )  WBC Count : 13.27 K/uL  RBC Count : 3.29 M/uL  Hemoglobin : 9.9 g/dL  Hematocrit : 30.5 %  Platelet Count - Automated : 105 K/uL  Mean Cell Volume : 92.7 fl  Mean Cell Hemoglobin : 30.1 pg  Mean Cell Hemoglobin Concentration : 32.5 gm/dL  Auto Neutrophil # : x  Auto Lymphocyte # : x  Auto Monocyte # : x  Auto Eosinophil # : x  Auto Basophil # : x  Auto Neutrophil % : x  Auto Lymphocyte % : x  Auto Monocyte % : x  Auto Eosinophil % : x  Auto Basophil % : x    01-01    138  |  98  |  90<H>  ----------------------------<  142<H>  4.2   |  24  |  4.56<H>    Ca    8.3<L>      01 Jan 2023 00:40  Phos  6.5     01-01  Mg     2.8     01-01    TPro  6.9  /  Alb  3.0<L>  /  TBili  0.8  /  DBili  x   /  AST  29  /  ALT  19  /  AlkPhos  82  01-01    LIVER FUNCTIONS - ( 01 Jan 2023 00:40 )  Alb: 3.0 g/dL / Pro: 6.9 g/dL / ALK PHOS: 82 U/L / ALT: 19 U/L / AST: 29 U/L / GGT: x               MICROBIOLOGY:  Clean Catch Clean Catch (Midstream)  12-30-22   <10,000 CFU/mL Normal Urogenital Christy  --  --      .Blood Blood-Peripheral  12-29-22   No growth to date.  --  --      .Blood Blood-Peripheral  12-27-22   No growth to date.  --  --      .Blood Blood-Peripheral  12-27-22   No growth to date.  --  --      Trach Asp Tracheal Aspirate  12-26-22   Normal Respiratory Christy present  --    No polymorphonuclear leukocytes per low power field  Few Squamous epithelial cells per low power field  Moderate Gram Positive Rods per oil power field      .Blood Blood  12-26-22   No Growth Final  --  --      .Blood Blood-Peripheral  12-26-22   No Growth Final  --  --      .Blood Blood-Peripheral  12-26-22   Growth in aerobic bottle: Candida tropicalis  ***Blood Panel PCR results on this specimen are available  approximately 3 hours after the Gram stain result.***  Gram stain, PCR, and/or culture results may not always  correspond due to difference in methodologies.  ************************************************************  This PCR assay was performed by multiplex PCR. This  Assay tests for 66 bacterial and resistance gene targets.  Please refer to the [facility] Labs test directory  at [email or web address] for details.  --  Blood Culture PCR  Candida tropicalis      Trach Asp Tracheal Aspirate  12-19-22   Normal Respiratory Christy present  --    Few polymorphonuclear leukocytes per low power field  Rare Squamous epithelial cells per low power field  Few Yeast like cells per oil power field  Rare Gram Positive Rods per oil power field      .Blood Blood-Peripheral  12-18-22   No Growth Final  --  --      .Sputum Sputum  12-15-22   Normal Respiratory Christy present  --    Few polymorphonuclear leukocytes per low power field  Rare Squamous epithelial cells per low power field  Rare Yeast per oil power field      .Blood Blood-Peripheral  12-15-22   No Growth Final  --  --      .Blood Blood-Peripheral  12-15-22   No Growth Final  --  --      Catheterized Catheterized  12-11-22   No growth  --  --      .Blood Blood-Peripheral  12-10-22   No Growth Final  --  --      .Blood Blood-Peripheral  12-10-22   No Growth Final  --  --      ET Tube ET Tube  12-10-22   Normal Respiratory Christy present  --    Moderate polymorphonuclear leukocytes per low power field  No Squamous epithelial cells per low power field  Few Yeast like cells per oil power field      ET Tube ET Tube  12-09-22   Normal Respiratory Christy present  --    Numerous polymorphonuclear leukocytes per low power field  Moderate Squamous epithelial cells per low power field  Few Yeast like cells  per oil power field      .Blood Blood-Peripheral  12-08-22   No Growth Final  --  --      .Blood Blood-Peripheral  12-08-22   No Growth Final  --  --      .Blood Blood  12-03-22   No Growth Final  --  --      ET Tube ET Tube  12-03-22   Normal Respiratory Christy present  --    Rare polymorphonuclear leukocytes per low power field  No Squamous epithelial cells per low power field  No organisms seen per oil power field      .Blood Blood  12-03-22   No Growth Final  --  --        RADIOLOGY    < from: Xray Chest 1 View- PORTABLE-Urgent (Xray Chest 1 View- PORTABLE-Urgent .) (12.31.22 @ 06:53) >  Interval decrease of bilateral patchy opacities.    < end of copied text >

## 2023-01-01 NOTE — PROGRESS NOTE ADULT - ATTENDING COMMENTS
I have seen this patient with the fellow and agree with their assessment and plan. I was physically present for significant portions of the evaluation and management (E/M) service provided.  I agree with the above history, physical, and plan which I have reviewed and edited where appropriate. I have seen this patient with the fellow and agree with their assessment and plan. I was physically present for significant portions of the evaluation and management (E/M) service provided.  I agree with the above history, physical, and plan which I have reviewed and edited where appropriate.    Xray worse,  needs UF today likely

## 2023-01-01 NOTE — PROGRESS NOTE ADULT - PROBLEM SELECTOR PLAN 2
Hgb below goal.  Iron saturation 22 with Ferritin of 355, will start 5k epo with HD sessions    If you have any questions, please feel free to contact me  Abhi Leone  Nephrology Fellow  [phone]; Prefer [employer] TEAMS  (After 5pm or on weekends please page the on-call fellow). Hgb below goal.  Iron saturation 22 with Ferritin of 355, will start 5k epo with HD sessions    If you have any questions, please feel free to contact me  Abhi Leone  Nephrology Fellow  [phone]; Prefer [employer] TEAMS  (After 5pm or on weekends please page the on-call fellow). Hgb below goal.  Iron saturation 22 with Ferritin of 355, will start 5k epo with HD sessions    If you have any questions, please feel free to contact me  Abhi Leone  Nephrology Fellow  [phone]; Prefer [employer] TEAMS  (After 5pm or on weekends please page the on-call fellow).

## 2023-01-01 NOTE — PROGRESS NOTE ADULT - NSPROGADDITIONALINFOA_GEN_ALL_CORE
ID coverage available over New Year's holiday weekend (12/31-1/2) if needed. Call #[phone] for questions/concerns. ID coverage available over New Year's holiday weekend (12/31-1/2) if needed. Call #[phone] for questions/concerns. ID coverage available over New Year's holiday weekend (12/31-1/2) if needed. Call #[phone] for questions/concerns.

## 2023-01-02 LAB
ALBUMIN SERPL ELPH-MCNC: 3.1 G/DL — LOW (ref 3.3–5)
ALP SERPL-CCNC: 80 U/L — SIGNIFICANT CHANGE UP (ref 40–120)
ALT FLD-CCNC: 19 U/L — SIGNIFICANT CHANGE UP (ref 10–45)
ANION GAP SERPL CALC-SCNC: 16 MMOL/L — SIGNIFICANT CHANGE UP (ref 5–17)
AST SERPL-CCNC: 26 U/L — SIGNIFICANT CHANGE UP (ref 10–40)
BILIRUB SERPL-MCNC: 0.8 MG/DL — SIGNIFICANT CHANGE UP (ref 0.2–1.2)
BLD GP AB SCN SERPL QL: NEGATIVE — SIGNIFICANT CHANGE UP
BUN SERPL-MCNC: 106 MG/DL — HIGH (ref 7–23)
CALCIUM SERPL-MCNC: 8.5 MG/DL — SIGNIFICANT CHANGE UP (ref 8.4–10.5)
CHLORIDE SERPL-SCNC: 97 MMOL/L — SIGNIFICANT CHANGE UP (ref 96–108)
CO2 SERPL-SCNC: 25 MMOL/L — SIGNIFICANT CHANGE UP (ref 22–31)
CREAT SERPL-MCNC: 5.14 MG/DL — HIGH (ref 0.5–1.3)
CULTURE RESULTS: SIGNIFICANT CHANGE UP
EGFR: 12 ML/MIN/1.73M2 — LOW
GAS PNL BLDA: SIGNIFICANT CHANGE UP
GLUCOSE BLDC GLUCOMTR-MCNC: 122 MG/DL — HIGH (ref 70–99)
GLUCOSE BLDC GLUCOMTR-MCNC: 126 MG/DL — HIGH (ref 70–99)
GLUCOSE BLDC GLUCOMTR-MCNC: 133 MG/DL — HIGH (ref 70–99)
GLUCOSE BLDC GLUCOMTR-MCNC: 153 MG/DL — HIGH (ref 70–99)
GLUCOSE BLDC GLUCOMTR-MCNC: 156 MG/DL — HIGH (ref 70–99)
GLUCOSE BLDC GLUCOMTR-MCNC: 158 MG/DL — HIGH (ref 70–99)
GLUCOSE SERPL-MCNC: 148 MG/DL — HIGH (ref 70–99)
HCT VFR BLD CALC: 31.2 % — LOW (ref 39–50)
HGB BLD-MCNC: 9.7 G/DL — LOW (ref 13–17)
MAGNESIUM SERPL-MCNC: 3 MG/DL — HIGH (ref 1.6–2.6)
MCHC RBC-ENTMCNC: 29.7 PG — SIGNIFICANT CHANGE UP (ref 27–34)
MCHC RBC-ENTMCNC: 31.1 GM/DL — LOW (ref 32–36)
MCV RBC AUTO: 95.4 FL — SIGNIFICANT CHANGE UP (ref 80–100)
NRBC # BLD: 0 /100 WBCS — SIGNIFICANT CHANGE UP (ref 0–0)
PHOSPHATE SERPL-MCNC: 8.6 MG/DL — HIGH (ref 2.5–4.5)
PLATELET # BLD AUTO: 102 K/UL — LOW (ref 150–400)
POTASSIUM SERPL-MCNC: 4.7 MMOL/L — SIGNIFICANT CHANGE UP (ref 3.5–5.3)
POTASSIUM SERPL-SCNC: 4.7 MMOL/L — SIGNIFICANT CHANGE UP (ref 3.5–5.3)
PROT SERPL-MCNC: 7.2 G/DL — SIGNIFICANT CHANGE UP (ref 6–8.3)
RBC # BLD: 3.27 M/UL — LOW (ref 4.2–5.8)
RBC # FLD: 18 % — HIGH (ref 10.3–14.5)
RH IG SCN BLD-IMP: POSITIVE — SIGNIFICANT CHANGE UP
SODIUM SERPL-SCNC: 138 MMOL/L — SIGNIFICANT CHANGE UP (ref 135–145)
SPECIMEN SOURCE: SIGNIFICANT CHANGE UP
WBC # BLD: 11.7 K/UL — HIGH (ref 3.8–10.5)
WBC # FLD AUTO: 11.7 K/UL — HIGH (ref 3.8–10.5)

## 2023-01-02 PROCEDURE — 99292 CRITICAL CARE ADDL 30 MIN: CPT | Mod: 25

## 2023-01-02 PROCEDURE — 36800 INSERTION OF CANNULA: CPT | Mod: 58

## 2023-01-02 PROCEDURE — 36620 INSERTION CATHETER ARTERY: CPT

## 2023-01-02 PROCEDURE — 99232 SBSQ HOSP IP/OBS MODERATE 35: CPT | Mod: GC

## 2023-01-02 PROCEDURE — 71045 X-RAY EXAM CHEST 1 VIEW: CPT | Mod: 26

## 2023-01-02 PROCEDURE — 99291 CRITICAL CARE FIRST HOUR: CPT | Mod: 25

## 2023-01-02 RX ADMIN — CHLORHEXIDINE GLUCONATE 15 MILLILITER(S): 213 SOLUTION TOPICAL at 17:33

## 2023-01-02 RX ADMIN — SEVELAMER CARBONATE 800 MILLIGRAM(S): 2400 POWDER, FOR SUSPENSION ORAL at 17:33

## 2023-01-02 RX ADMIN — CHLORHEXIDINE GLUCONATE 15 MILLILITER(S): 213 SOLUTION TOPICAL at 06:08

## 2023-01-02 RX ADMIN — HUMAN INSULIN 7 UNIT(S): 100 INJECTION, SUSPENSION SUBCUTANEOUS at 06:21

## 2023-01-02 RX ADMIN — Medication 81 MILLIGRAM(S): at 14:01

## 2023-01-02 RX ADMIN — SIMETHICONE 80 MILLIGRAM(S): 80 TABLET, CHEWABLE ORAL at 15:02

## 2023-01-02 RX ADMIN — SEVELAMER CARBONATE 800 MILLIGRAM(S): 2400 POWDER, FOR SUSPENSION ORAL at 06:08

## 2023-01-02 RX ADMIN — ATORVASTATIN CALCIUM 80 MILLIGRAM(S): 80 TABLET, FILM COATED ORAL at 21:45

## 2023-01-02 RX ADMIN — Medication 15 MILLILITER(S): at 14:01

## 2023-01-02 RX ADMIN — Medication 3: at 17:39

## 2023-01-02 RX ADMIN — HUMAN INSULIN 7 UNIT(S): 100 INJECTION, SUSPENSION SUBCUTANEOUS at 17:34

## 2023-01-02 RX ADMIN — Medication 3: at 01:31

## 2023-01-02 RX ADMIN — Medication 100 MILLIGRAM(S): at 14:01

## 2023-01-02 RX ADMIN — Medication 3: at 13:58

## 2023-01-02 RX ADMIN — PANTOPRAZOLE SODIUM 40 MILLIGRAM(S): 20 TABLET, DELAYED RELEASE ORAL at 14:17

## 2023-01-02 RX ADMIN — CASPOFUNGIN ACETATE 260 MILLIGRAM(S): 7 INJECTION, POWDER, LYOPHILIZED, FOR SOLUTION INTRAVENOUS at 20:20

## 2023-01-02 RX ADMIN — AMIODARONE HYDROCHLORIDE 200 MILLIGRAM(S): 400 TABLET ORAL at 06:08

## 2023-01-02 RX ADMIN — CHLORHEXIDINE GLUCONATE 1 APPLICATION(S): 213 SOLUTION TOPICAL at 06:08

## 2023-01-02 RX ADMIN — HEPARIN SODIUM 5000 UNIT(S): 5000 INJECTION INTRAVENOUS; SUBCUTANEOUS at 01:28

## 2023-01-02 RX ADMIN — HUMAN INSULIN 7 UNIT(S): 100 INJECTION, SUSPENSION SUBCUTANEOUS at 14:00

## 2023-01-02 RX ADMIN — HEPARIN SODIUM 5000 UNIT(S): 5000 INJECTION INTRAVENOUS; SUBCUTANEOUS at 09:37

## 2023-01-02 RX ADMIN — ERYTHROPOIETIN 5000 UNIT(S): 10000 INJECTION, SOLUTION INTRAVENOUS; SUBCUTANEOUS at 17:51

## 2023-01-02 RX ADMIN — HEPARIN SODIUM 5000 UNIT(S): 5000 INJECTION INTRAVENOUS; SUBCUTANEOUS at 17:34

## 2023-01-02 NOTE — PROCEDURE NOTE - NSCHLORHEXIDINEBATH_GEN_A_CORE
2% wipes/To be discontinued when line removed
2% wipes
2% wipes/To be discontinued when line removed
To be discontinued when line removed
To be discontinued when line removed
2% wipes
2% wipes
2% wipes/To be discontinued when line removed

## 2023-01-02 NOTE — PROCEDURE NOTE - NSSITEPREP_SKIN_A_CORE
chlorhexidine
chlorhexidine/Adherence to aseptic technique: hand hygiene prior to donning barriers (gown, gloves), don cap and mask, sterile drape over patient
chlorhexidine/Adherence to aseptic technique: hand hygiene prior to donning barriers (gown, gloves), don cap and mask, sterile drape over patient
chlorhexidine
chlorhexidine/Adherence to aseptic technique: hand hygiene prior to donning barriers (gown, gloves), don cap and mask, sterile drape over patient
chlorhexidine
chlorhexidine

## 2023-01-02 NOTE — PROGRESS NOTE ADULT - PROBLEM SELECTOR PLAN 1
Pt with non oliguric ERIC/ ATN in the setting of STEMI, cardiac arrest & cardiogenic shock  SCr on arrival was 2.15; trended down to hector 1.6 on 11/25; slowly trended up & increased to 3.95 11/28. Pt initiated on CRRT/CVVHDF to optimize volume and electrolytes on 12/5/22. Pt likely with ATN. Pt underwent decannulation of ECMO on 12/8/22 and was taken off CRRT. Pt reinitiated on CRRT overnight on 12/9/22 for volume overload. s/p trach on 12/16. CRRT stopped again 12/19. Bladder scan daily. Now with De Dios. Off Lasix gtt.   S/p HD 12/30 and UF session with 2L removal on 12/31    Will plan for HD today.   Consider using Midodrine before/during HD so that Pt. can tolerate session.  Will need tunneled catheter placed for ongoing dialysis needs once cleared from infectious standpoint.   Continue to monitor for renal recovery.    Monitor labs and urine output. Avoid any potential nephrotoxins. Dose medications as per HD. Pt with non oliguric ERIC/ ATN in the setting of STEMI, cardiac arrest & cardiogenic shock  SCr on arrival was 2.15; trended down to hector 1.6 on 11/25; slowly trended up & increased to 3.95 11/28. Pt initiated on CRRT/CVVHDF to optimize volume and electrolytes on 12/5/22. Pt likely with ATN. Pt underwent decannulation of ECMO on 12/8/22 and was taken off CRRT.     Pt reinitiated on CRRT overnight on 12/9/22 for volume overload. s/p trach on 12/16. CRRT stopped again 12/19. Bladder scan daily. Now with De Dios. Off Lasix gtt.     S/p HD 12/30 and UF session with 2L removal on 12/31    Will plan for HD today with 1kg UF  Consider using Midodrine before/during HD so that Pt. can tolerate session.  Will need tunneled catheter placed for ongoing dialysis needs once cleared from infectious standpoint.   Continue to monitor for renal recovery.    Monitor labs and urine output. Avoid any potential nephrotoxins. Dose medications as per HD.

## 2023-01-02 NOTE — PROGRESS NOTE ADULT - SUBJECTIVE AND OBJECTIVE BOX
Patient seen and examined at the bedside.    Remained critically ill on continuous ICU monitoring.    OBJECTIVE:  Vital Signs Last 24 Hrs  T(C): 37.1 (02 Jan 2023 17:18), Max: 37.4 (02 Jan 2023 00:00)  T(F): 98.8 (02 Jan 2023 17:18), Max: 99.3 (02 Jan 2023 00:00)  HR: 98 (02 Jan 2023 18:00) (94 - 112)  BP: --  BP(mean): --  RR: 25 (02 Jan 2023 18:00) (13 - 43)  SpO2: 100% (02 Jan 2023 18:00) (97% - 100%)    Parameters below as of 02 Jan 2023 17:18  Patient On (Oxygen Delivery Method): tracheostomy collar    O2 Concentration (%): 40      Physical Exam:   General: NAD   Neurology: nonfocal   Eyes: bilateral pupils equal and reactive   ENT/Neck: Neck supple, trachea midline, No JVD   Respiratory: Clear bilaterally   CV: S1S2, no murmurs        [x] Sternal dressing, [x] Mediastinal CT, [x] Pleural CT, [x] JOSÉ LUIS drain        [x] Sinus rhythm, [x] Afib, [x] Temporary pacing, [x] PPM  Abdominal: Soft, NT, ND +BS   Extremities: 1-2+ pedal edema noted, + peripheral pulses   Skin: No Rashes, Hematoma, Ecchymosis                           Assessment:  63 y/o M admitted with a STEMI and cardiogenic shock.     Cardiogenic shock s/p VA ECMO decannulated 12/8  Mitral regurgitation s/p Mitral clip x1 12/16/22  Acute respiratory distress/failure s/p Tracheostomy 12/16/22   At high risk for hemodynamic instability  ERIC/Renal failure  Fungemia  Anemia  Thrombocytopenia         Plan:   ***Neuro***  CT head showed 4mm subdural hematoma 11/26: repeat CT head unchanged 12/01  Soft palate laceration, ENT scoped 12/8, stable, no acute intervention at this time  Repeat Head CT 12/13 Similar minimal increased density along the right aspect of the posterior   falx and right tentorial leaf, consistent with minimal residual subdural   hemorrhage.  No parenchymal hemorrhage or brain edema.  [x] Nonfocal  OOBTC      ***Cardiovascular***  Invasive hemodynamic monitoring, assess perfusion indices   SR / MAP 78/ Hct 31.2/ Lactate 1.0  Reassessment of hemodynamics post resuscitation   Amiodarone for rate control   Holding Lopressor   [x] VTE prophylaxis with SubQ Heparin   [x] ASA [x] Statin   Serial EKG and cardiac enzymes   L arm precautions for AVF planning       ***Pulmonary***  Trach Collar since 5 AM today  Post op vent management   Titration of FiO2 and PEEP, follow SpO2, CXR, blood gasses     Mode: standby              ***GI***  [x] Diet: Tolerating TF Glucerna 1.2, @ goal 65cc/hr.  [x] Protonix   Bowel regimen with senna   Simethicone PRN gas    ***Renal***   [x] ERIC- HD again today, removed 1 kg   Continue to monitor I/Os, BUN/Creatinine.   Replete lytes PRN  De Dios present       ***ID***  Candidemia - On Caspofungin  12/26 Bcx +clinton 12/27 12/29 BCx NGTD   12/30 UCx NGTD 1/1 SCx NGTD        ***Endocrine***  [x]  DM2: HbA1c 7.3%                - [x] ISS  [x] NPH              - Need tight glycemic control to prevent wound infection.          Patient requires continuous monitoring with bedside rhythm monitoring, pulse oximetry monitoring, and continuous central venous and arterial pressure monitoring; and intermittent blood gas analysis. Care plan discussed with the ICU care team.   Patient remained critical, at risk for life threatening decompensation.    I have spent 45 minutes providing critical care management to this patient.    By signing my name below, I, Abhijit Ngo, attest that this documentation has been prepared under the direction and in the presence of Rina Perez NP   Electronically signed: Jolly Slaughter, 01-02-23 @ 19:09    I, Rina Perez NP, personally performed the services described in this documentation. all medical record entries made by the jolly were at my direction and in my presence. I have reviewed the chart and agree that the record reflects my personal performance and is accurate and complete  Electronically signed: Rina Perez NP  Patient seen and examined at the bedside.    Remained critically ill on continuous ICU monitoring.    OBJECTIVE:  Vital Signs Last 24 Hrs  T(C): 37.1 (02 Jan 2023 17:18), Max: 37.4 (02 Jan 2023 00:00)  T(F): 98.8 (02 Jan 2023 17:18), Max: 99.3 (02 Jan 2023 00:00)  HR: 98 (02 Jan 2023 18:00) (94 - 112)  BP: --  BP(mean): --  RR: 25 (02 Jan 2023 18:00) (13 - 43)  SpO2: 100% (02 Jan 2023 18:00) (97% - 100%)    Parameters below as of 02 Jan 2023 17:18  Patient On (Oxygen Delivery Method): tracheostomy collar    O2 Concentration (%): 40      Physical Exam:   General: NAD   Neurology: nonfocal   Eyes: bilateral pupils equal and reactive   ENT/Neck: Neck supple, trachea midline, No JVD   Respiratory: Clear bilaterally   CV: S1S2, no murmurs        [x] Sternal dressing, [x] Mediastinal CT, [x] Pleural CT, [x] JOSÉ LUIS drain        [x] Sinus rhythm, [x] Afib, [x] Temporary pacing, [x] PPM  Abdominal: Soft, NT, ND +BS   Extremities: 1-2+ pedal edema noted, + peripheral pulses   Skin: No Rashes, Hematoma, Ecchymosis                           Assessment:  61 y/o M admitted with a STEMI and cardiogenic shock.     Cardiogenic shock s/p VA ECMO decannulated 12/8  Mitral regurgitation s/p Mitral clip x1 12/16/22  Acute respiratory distress/failure s/p Tracheostomy 12/16/22   At high risk for hemodynamic instability  ERIC/Renal failure  Fungemia  Anemia  Thrombocytopenia         Plan:   ***Neuro***  CT head showed 4mm subdural hematoma 11/26: repeat CT head unchanged 12/01  Soft palate laceration, ENT scoped 12/8, stable, no acute intervention at this time  Repeat Head CT 12/13 Similar minimal increased density along the right aspect of the posterior   falx and right tentorial leaf, consistent with minimal residual subdural   hemorrhage.  No parenchymal hemorrhage or brain edema.  [x] Nonfocal  OOBTC      ***Cardiovascular***  Invasive hemodynamic monitoring, assess perfusion indices   SR / MAP 78/ Hct 31.2/ Lactate 1.0  Reassessment of hemodynamics post resuscitation   Amiodarone for rate control   Holding Lopressor   [x] VTE prophylaxis with SubQ Heparin   [x] ASA [x] Statin   Serial EKG and cardiac enzymes   L arm precautions for AVF planning       ***Pulmonary***  Trach Collar since 5 AM today  Post op vent management   Titration of FiO2 and PEEP, follow SpO2, CXR, blood gasses     Mode: standby              ***GI***  [x] Diet: Tolerating TF Glucerna 1.2, @ goal 65cc/hr.  [x] Protonix   Bowel regimen with senna   Simethicone PRN gas    ***Renal***   [x] ERIC- HD again today, removed 1 kg   Continue to monitor I/Os, BUN/Creatinine.   Replete lytes PRN  De Dios present       ***ID***  Candidemia - On Caspofungin  12/26 Bcx +clinton 12/27 12/29 BCx NGTD   12/30 UCx NGTD 1/1 SCx NGTD        ***Endocrine***  [x]  DM2: HbA1c 7.3%                - [x] ISS  [x] NPH              - Need tight glycemic control to prevent wound infection.          Patient requires continuous monitoring with bedside rhythm monitoring, pulse oximetry monitoring, and continuous central venous and arterial pressure monitoring; and intermittent blood gas analysis. Care plan discussed with the ICU care team.   Patient remained critical, at risk for life threatening decompensation.    I have spent 45 minutes providing critical care management to this patient.    By signing my name below, I, Abhijit Ngo, attest that this documentation has been prepared under the direction and in the presence of Rina Perez NP   Electronically signed: Jolly Slaughter, 01-02-23 @ 19:09    I, Rina Perez NP, personally performed the services described in this documentation. all medical record entries made by the jolly were at my direction and in my presence. I have reviewed the chart and agree that the record reflects my personal performance and is accurate and complete  Electronically signed: iRna Perez NP

## 2023-01-02 NOTE — PROCEDURE NOTE - NSANESTHESIA_GEN_A_CORE
1% lidocaine
April 18, 2017      RE: Jono Celeste  [address] [id number]        To whom it may concern:      Please excuse Jono today from school for he is under our care at the [facility]. If you have questions/concerns, you may call the pediatric staff at the above number.          Sincerely,          Mayte Gresham Nurse MD    
1% lidocaine

## 2023-01-02 NOTE — PROCEDURE NOTE - PRACTITIONER PERFORMING THE TIME OUT
angely
sjohn
Douglas
Yobani Alaniz NP
Yobani Alaniz NP
Sjohn
angely
BROOKE Jorge
JESSICA Domingo
BROOKE Willett
MARY COX
Manasa COX
Alexi Adams, DO

## 2023-01-02 NOTE — PROCEDURE NOTE - NSPOSTCAREGUIDE_GEN_A_CORE
Verbal/written post procedure instructions were given to patient/caregiver/Care for catheter as per unit/ICU protocols
Verbal/written post procedure instructions were given to patient/caregiver
Verbal/written post procedure instructions were given to patient/caregiver/Care for catheter as per unit/ICU protocols
Care for catheter as per unit/ICU protocols
Verbal/written post procedure instructions were given to patient/caregiver
Verbal/written post procedure instructions were given to patient/caregiver/Care for catheter as per unit/ICU protocols
Verbal/written post procedure instructions were given to patient/caregiver/Care for catheter as per unit/ICU protocols
Care for catheter as per unit/ICU protocols
Care for catheter as per unit/ICU protocols
Verbal/written post procedure instructions were given to patient/caregiver/Care for catheter as per unit/ICU protocols
Care for catheter as per unit/ICU protocols
Verbal/written post procedure instructions were given to patient/caregiver/Care for catheter as per unit/ICU protocols
Care for catheter as per unit/ICU protocols
Verbal/written post procedure instructions were given to patient/caregiver
Care for catheter as per unit/ICU protocols
Verbal/written post procedure instructions were given to patient/caregiver/Care for catheter as per unit/ICU protocols
Care for catheter as per unit/ICU protocols
Verbal/written post procedure instructions were given to patient/caregiver/Care for catheter as per unit/ICU protocols

## 2023-01-02 NOTE — PROCEDURE NOTE - NSPOSTPRCRAD_GEN_A_CORE
central line located in the superior vena cava/post-procedure radiography performed
central line located in the superior vena cava/no pneumothorax/post-procedure radiography performed
central line located in the superior vena cava
central line located in the superior vena cava
central line located in the superior vena cava/no pneumothorax
central line located in the superior vena cava/line adjusted to depth of insertion/no pneumothorax/post-procedure radiography performed
femoral vein/central line located in the
central line located in the superior vena cava/depth of insertion/line adjusted to depth of insertion/no pneumothorax/post-procedure radiography performed
central line located in the superior vena cava/line adjusted to depth of insertion/no pneumothorax/post-procedure radiography performed
central line located in the superior vena cava/post-procedure radiography performed
post-procedure radiography performed

## 2023-01-02 NOTE — PROCEDURE NOTE - NSCVLACTUALSITE_VASC_A_CORE
right
left/internal jugular
left/placement in inferior vena cava
right/internal jugular
right/internal jugular
left/femoral vein
right/internal jugular
left/internal jugular
left/subclavian vein
right/subclavian vein
right/internal jugular

## 2023-01-02 NOTE — PROCEDURE NOTE - NSCVLATTEMPTSITEVASC_A_CORE
left/internal jugular
right/internal jugular
right/subclavian vein
right/internal jugular
right/internal jugular
left/femoral vein
left/subclavian vein
left/internal jugular
left/internal jugular
right/internal jugular

## 2023-01-02 NOTE — PROGRESS NOTE ADULT - SUBJECTIVE AND OBJECTIVE BOX
[facility] DIVISION OF KIDNEY DISEASES AND HYPERTENSION -- FOLLOW UP NOTE  --------------------------------------------------------------------------------  61 y/o male with PMH of CABG, DM, HTN, HLD presenting as transfer from [place] for c/f STEMI. Course c/b gallstone pancreatitis leading to ARDS and shock & cardiac arrest now on [facility]. Had significant troponin elevation and his LVEF down to 8%. Pt was deemed to be too unstable to have an angiogram and potential PCI due to his co-morbidities & a new subdural bleed. Pt being seen for ERIC. SCr on arrival was 2.15; trended down to hector 1.6 on 11/25 and eventually increased to 3.95 11/28. Pt received IV diuretics as per CTU. Pt initiated on CRRT on 12/5/22 to optimize volume status and electrolytes.  Pt underwent decannulation of ECMO on 12/8/22. Pt was restarted on 12/9/22 for volume overload. Pt underwent mitral clip OR (12/16/22). s/p trach on 12/16     Overnight Events: Pt. seen this AM sitting up in a chair, comfortable. Plan for HD today. Has new RIJ catheter.       PAST HISTORY  --------------------------------------------------------------------------------  No significant changes to PMH, PSH, FHx, SHx, unless otherwise noted    ALLERGIES & MEDICATIONS  --------------------------------------------------------------------------------  Allergies    No Known Allergies    Intolerances      Standing Inpatient Medications  aMIOdarone    Tablet 200 milliGRAM(s) Oral daily  aspirin  chewable 81 milliGRAM(s) Oral daily  atorvastatin 80 milliGRAM(s) Oral at bedtime  caspofungin IVPB 50 milliGRAM(s) IV Intermittent every 24 hours  caspofungin IVPB      chlorhexidine 0.12% Liquid 15 milliLiter(s) Oral Mucosa every 12 hours  chlorhexidine 2% Cloths 1 Application(s) Topical <User Schedule>  dextrose 5%. 1000 milliLiter(s) IV Continuous <Continuous>  dextrose 50% Injectable 50 milliLiter(s) IV Push every 15 minutes  epoetin teena-epbx (RETACRIT) Injectable 5000 Unit(s) IV Push <User Schedule>  glucagon  Injectable 1 milliGRAM(s) IntraMuscular once  heparin   Injectable 5000 Unit(s) SubCutaneous every 8 hours  insulin lispro (ADMELOG) corrective regimen sliding scale   SubCutaneous every 4 hours  insulin NPH human recombinant 7 Unit(s) SubCutaneous every 6 hours  multivitamin/minerals/iron Oral Solution (CENTRUM) 15 milliLiter(s) Oral daily  pantoprazole  Injectable 40 milliGRAM(s) IV Push daily  senna 2 Tablet(s) Oral at bedtime  sevelamer carbonate Powder 800 milliGRAM(s) Enteral Tube two times a day  thiamine 100 milliGRAM(s) Enteral Tube daily    PRN Inpatient Medications  acetaminophen    Suspension .. 650 milliGRAM(s) Oral every 6 hours PRN  dextrose Oral Gel 15 Gram(s) Oral once PRN  simethicone 80 milliGRAM(s) Chew daily PRN  sodium chloride 0.9% lock flush 10 milliLiter(s) IV Push every 1 hour PRN      REVIEW OF SYSTEMS  --------------------------------------------------------------------------------  Unable to obtain    VITALS/PHYSICAL EXAM  --------------------------------------------------------------------------------  T(C): 36.8 (01-02-23 @ 04:00), Max: 37.4 (01-02-23 @ 00:00)  HR: 100 (01-02-23 @ 09:00) (94 - 112)  BP: --  RR: 19 (01-02-23 @ 09:00) (13 - 43)  SpO2: 100% (01-02-23 @ 09:00) (93% - 100%)  Wt(kg): --        01-01-23 @ 07:01  -  01-02-23 @ 07:00  --------------------------------------------------------  IN: 1746.1 mL / OUT: 3035 mL / NET: -1288.9 mL    Physical Exam:  	Gen: Sitting up in chair, comfortable. NGT in place  	HEENT: Anicteric  	Pulm:  trach+  	CV: S1S2+  	Abd: Soft, +BS, +abdominal pain LLQ      	Ext: trace LE edema B/L  	Neuro: Awake  	Skin: Warm and dry  	Vascular access: [facility] catheter        LABS/STUDIES  --------------------------------------------------------------------------------              9.7    11.70 >-----------<  102      [01-02-23 @ 01:24]              31.2     138  |  97  |  106  ----------------------------<  148      [01-02-23 @ 01:24]  4.7   |  25  |  5.14        Ca     8.5     [01-02-23 @ 01:24]      Mg     3.0     [01-02-23 @ 01:24]      Phos  8.6     [01-02-23 @ 01:24]    TPro  7.2  /  Alb  3.1  /  TBili  0.8  /  DBili  x   /  AST  26  /  ALT  19  /  AlkPhos  80  [01-02-23 @ 01:24]          Creatinine Trend:  SCr 5.14 [01-02 @ 01:24]  SCr 4.56 [01-01 @ 00:40]  SCr 4.20 [12-31 @ 00:24]  SCr 5.36 [12-30 @ 00:33]  SCr 4.64 [12-29 @ 00:35]    Urinalysis - [12-26-22 @ 13:19]      Color Yellow / Appearance Clear / SG 1.013 / pH 6.0      Gluc Negative / Ketone Negative  / Bili Negative / Urobili Negative       Blood Large / Protein 100 mg/dL / Leuk Est Moderate / Nitrite Negative      RBC >50 / WBC 35 / Hyaline 1 / Gran  / Sq Epi  / Non Sq Epi 2 / Bacteria Few      Iron 39, TIBC 222, %sat 18      [12-31-22 @ 06:41]  Ferritin 346      [12-31-22 @ 06:42]  TSH 2.84      [12-10-22 @ 15:55]  Lipid: chol --, , HDL --, LDL --      [12-05-22 @ 00:50]    HBsAb 889.7      [12-20-22 @ 11:25]  HBcAb Reactive      [12-20-22 @ 11:25]  HCV 0.15, Nonreact      [12-20-22 @ 11:25]     [facility] DIVISION OF KIDNEY DISEASES AND HYPERTENSION -- FOLLOW UP NOTE  --------------------------------------------------------------------------------  63 y/o male with PMH of CABG, DM, HTN, HLD presenting as transfer from [place] for c/f STEMI. Course c/b gallstone pancreatitis leading to ARDS and shock & cardiac arrest now on [facility] ECMO. Had significant troponin elevation and his LVEF down to 8%. Pt was deemed to be too unstable to have an angiogram and potential PCI due to his co-morbidities & a new subdural bleed. Pt being seen for ERIC. SCr on arrival was 2.15; trended down to hector 1.6 on 11/25 and eventually increased to 3.95 11/28. Pt received IV diuretics as per CTU. Pt initiated on CRRT on 12/5/22 to optimize volume status and electrolytes.  Pt underwent decannulation of ECMO on 12/8/22. Pt was restarted on 12/9/22 for volume overload. Pt underwent mitral clip OR (12/16/22). s/p trach on 12/16     Overnight Events: Pt. seen this AM sitting up in a chair, comfortable. Plan for HD today. Has new RIJ catheter.       PAST HISTORY  --------------------------------------------------------------------------------  No significant changes to PMH, PSH, FHx, SHx, unless otherwise noted    ALLERGIES & MEDICATIONS  --------------------------------------------------------------------------------  Allergies    No Known Allergies    Intolerances      Standing Inpatient Medications  aMIOdarone    Tablet 200 milliGRAM(s) Oral daily  aspirin  chewable 81 milliGRAM(s) Oral daily  atorvastatin 80 milliGRAM(s) Oral at bedtime  caspofungin IVPB 50 milliGRAM(s) IV Intermittent every 24 hours  caspofungin IVPB      chlorhexidine 0.12% Liquid 15 milliLiter(s) Oral Mucosa every 12 hours  chlorhexidine 2% Cloths 1 Application(s) Topical <User Schedule>  dextrose 5%. 1000 milliLiter(s) IV Continuous <Continuous>  dextrose 50% Injectable 50 milliLiter(s) IV Push every 15 minutes  epoetin teena-epbx (RETACRIT) Injectable 5000 Unit(s) IV Push <User Schedule>  glucagon  Injectable 1 milliGRAM(s) IntraMuscular once  heparin   Injectable 5000 Unit(s) SubCutaneous every 8 hours  insulin lispro (ADMELOG) corrective regimen sliding scale   SubCutaneous every 4 hours  insulin NPH human recombinant 7 Unit(s) SubCutaneous every 6 hours  multivitamin/minerals/iron Oral Solution (CENTRUM) 15 milliLiter(s) Oral daily  pantoprazole  Injectable 40 milliGRAM(s) IV Push daily  senna 2 Tablet(s) Oral at bedtime  sevelamer carbonate Powder 800 milliGRAM(s) Enteral Tube two times a day  thiamine 100 milliGRAM(s) Enteral Tube daily    PRN Inpatient Medications  acetaminophen    Suspension .. 650 milliGRAM(s) Oral every 6 hours PRN  dextrose Oral Gel 15 Gram(s) Oral once PRN  simethicone 80 milliGRAM(s) Chew daily PRN  sodium chloride 0.9% lock flush 10 milliLiter(s) IV Push every 1 hour PRN      REVIEW OF SYSTEMS  --------------------------------------------------------------------------------  Unable to obtain    VITALS/PHYSICAL EXAM  --------------------------------------------------------------------------------  T(C): 36.8 (01-02-23 @ 04:00), Max: 37.4 (01-02-23 @ 00:00)  HR: 100 (01-02-23 @ 09:00) (94 - 112)  BP: --  RR: 19 (01-02-23 @ 09:00) (13 - 43)  SpO2: 100% (01-02-23 @ 09:00) (93% - 100%)  Wt(kg): --        01-01-23 @ 07:01  -  01-02-23 @ 07:00  --------------------------------------------------------  IN: 1746.1 mL / OUT: 3035 mL / NET: -1288.9 mL    Physical Exam:  	Gen: Sitting up in chair, comfortable. NGT in place  	HEENT: Anicteric  	Pulm:  trach+  	CV: S1S2+  	Abd: Soft, +BS, +abdominal pain LLQ      	Ext: trace LE edema B/L  	Neuro: Awake  	Skin: Warm and dry  	Vascular access: [facility] catheter        LABS/STUDIES  --------------------------------------------------------------------------------              9.7    11.70 >-----------<  102      [01-02-23 @ 01:24]              31.2     138  |  97  |  106  ----------------------------<  148      [01-02-23 @ 01:24]  4.7   |  25  |  5.14        Ca     8.5     [01-02-23 @ 01:24]      Mg     3.0     [01-02-23 @ 01:24]      Phos  8.6     [01-02-23 @ 01:24]    TPro  7.2  /  Alb  3.1  /  TBili  0.8  /  DBili  x   /  AST  26  /  ALT  19  /  AlkPhos  80  [01-02-23 @ 01:24]          Creatinine Trend:  SCr 5.14 [01-02 @ 01:24]  SCr 4.56 [01-01 @ 00:40]  SCr 4.20 [12-31 @ 00:24]  SCr 5.36 [12-30 @ 00:33]  SCr 4.64 [12-29 @ 00:35]    Urinalysis - [12-26-22 @ 13:19]      Color Yellow / Appearance Clear / SG 1.013 / pH 6.0      Gluc Negative / Ketone Negative  / Bili Negative / Urobili Negative       Blood Large / Protein 100 mg/dL / Leuk Est Moderate / Nitrite Negative      RBC >50 / WBC 35 / Hyaline 1 / Gran  / Sq Epi  / Non Sq Epi 2 / Bacteria Few      Iron 39, TIBC 222, %sat 18      [12-31-22 @ 06:41]  Ferritin 346      [12-31-22 @ 06:42]  TSH 2.84      [12-10-22 @ 15:55]  Lipid: chol --, , HDL --, LDL --      [12-05-22 @ 00:50]    HBsAb 889.7      [12-20-22 @ 11:25]  HBcAb Reactive      [12-20-22 @ 11:25]  HCV 0.15, Nonreact      [12-20-22 @ 11:25]     [facility] DIVISION OF KIDNEY DISEASES AND HYPERTENSION -- FOLLOW UP NOTE  --------------------------------------------------------------------------------  61 y/o male with PMH of CABG, DM, HTN, HLD presenting as transfer from [place] for c/f STEMI. Course c/b gallstone pancreatitis leading to ARDS and shock & cardiac arrest now on [facility] ECMO. Had significant troponin elevation and his LVEF down to 8%. Pt was deemed to be too unstable to have an angiogram and potential PCI due to his co-morbidities & a new subdural bleed. Pt being seen for ERIC. SCr on arrival was 2.15; trended down to hector 1.6 on 11/25 and eventually increased to 3.95 11/28. Pt received IV diuretics as per CTU. Pt initiated on CRRT on 12/5/22 to optimize volume status and electrolytes.  Pt underwent decannulation of ECMO on 12/8/22. Pt was restarted on 12/9/22 for volume overload. Pt underwent mitral clip OR (12/16/22). s/p trach on 12/16     Overnight Events: Pt. seen this AM sitting up in a chair, comfortable. Plan for HD today. Has new RIJ catheter.       PAST HISTORY  --------------------------------------------------------------------------------  No significant changes to PMH, PSH, FHx, SHx, unless otherwise noted    ALLERGIES & MEDICATIONS  --------------------------------------------------------------------------------  Allergies    No Known Allergies    Intolerances      Standing Inpatient Medications  aMIOdarone    Tablet 200 milliGRAM(s) Oral daily  aspirin  chewable 81 milliGRAM(s) Oral daily  atorvastatin 80 milliGRAM(s) Oral at bedtime  caspofungin IVPB 50 milliGRAM(s) IV Intermittent every 24 hours  caspofungin IVPB      chlorhexidine 0.12% Liquid 15 milliLiter(s) Oral Mucosa every 12 hours  chlorhexidine 2% Cloths 1 Application(s) Topical <User Schedule>  dextrose 5%. 1000 milliLiter(s) IV Continuous <Continuous>  dextrose 50% Injectable 50 milliLiter(s) IV Push every 15 minutes  epoetin teena-epbx (RETACRIT) Injectable 5000 Unit(s) IV Push <User Schedule>  glucagon  Injectable 1 milliGRAM(s) IntraMuscular once  heparin   Injectable 5000 Unit(s) SubCutaneous every 8 hours  insulin lispro (ADMELOG) corrective regimen sliding scale   SubCutaneous every 4 hours  insulin NPH human recombinant 7 Unit(s) SubCutaneous every 6 hours  multivitamin/minerals/iron Oral Solution (CENTRUM) 15 milliLiter(s) Oral daily  pantoprazole  Injectable 40 milliGRAM(s) IV Push daily  senna 2 Tablet(s) Oral at bedtime  sevelamer carbonate Powder 800 milliGRAM(s) Enteral Tube two times a day  thiamine 100 milliGRAM(s) Enteral Tube daily    PRN Inpatient Medications  acetaminophen    Suspension .. 650 milliGRAM(s) Oral every 6 hours PRN  dextrose Oral Gel 15 Gram(s) Oral once PRN  simethicone 80 milliGRAM(s) Chew daily PRN  sodium chloride 0.9% lock flush 10 milliLiter(s) IV Push every 1 hour PRN      REVIEW OF SYSTEMS  --------------------------------------------------------------------------------  Unable to obtain    VITALS/PHYSICAL EXAM  --------------------------------------------------------------------------------  T(C): 36.8 (01-02-23 @ 04:00), Max: 37.4 (01-02-23 @ 00:00)  HR: 100 (01-02-23 @ 09:00) (94 - 112)  BP: --  RR: 19 (01-02-23 @ 09:00) (13 - 43)  SpO2: 100% (01-02-23 @ 09:00) (93% - 100%)  Wt(kg): --        01-01-23 @ 07:01  -  01-02-23 @ 07:00  --------------------------------------------------------  IN: 1746.1 mL / OUT: 3035 mL / NET: -1288.9 mL    Physical Exam:  	Gen: Sitting up in chair, comfortable. NGT in place  	HEENT: Anicteric  	Pulm:  trach+  	CV: S1S2+  	Abd: Soft, +BS, +abdominal pain LLQ      	Ext: trace LE edema B/L  	Neuro: Awake  	Skin: Warm and dry  	Vascular access: [facility] catheter        LABS/STUDIES  --------------------------------------------------------------------------------              9.7    11.70 >-----------<  102      [01-02-23 @ 01:24]              31.2     138  |  97  |  106  ----------------------------<  148      [01-02-23 @ 01:24]  4.7   |  25  |  5.14        Ca     8.5     [01-02-23 @ 01:24]      Mg     3.0     [01-02-23 @ 01:24]      Phos  8.6     [01-02-23 @ 01:24]    TPro  7.2  /  Alb  3.1  /  TBili  0.8  /  DBili  x   /  AST  26  /  ALT  19  /  AlkPhos  80  [01-02-23 @ 01:24]          Creatinine Trend:  SCr 5.14 [01-02 @ 01:24]  SCr 4.56 [01-01 @ 00:40]  SCr 4.20 [12-31 @ 00:24]  SCr 5.36 [12-30 @ 00:33]  SCr 4.64 [12-29 @ 00:35]    Urinalysis - [12-26-22 @ 13:19]      Color Yellow / Appearance Clear / SG 1.013 / pH 6.0      Gluc Negative / Ketone Negative  / Bili Negative / Urobili Negative       Blood Large / Protein 100 mg/dL / Leuk Est Moderate / Nitrite Negative      RBC >50 / WBC 35 / Hyaline 1 / Gran  / Sq Epi  / Non Sq Epi 2 / Bacteria Few      Iron 39, TIBC 222, %sat 18      [12-31-22 @ 06:41]  Ferritin 346      [12-31-22 @ 06:42]  TSH 2.84      [12-10-22 @ 15:55]  Lipid: chol --, , HDL --, LDL --      [12-05-22 @ 00:50]    HBsAb 889.7      [12-20-22 @ 11:25]  HBcAb Reactive      [12-20-22 @ 11:25]  HCV 0.15, Nonreact      [12-20-22 @ 11:25]

## 2023-01-02 NOTE — PROCEDURE NOTE - NSNUMOFLUMENS_VASC_A_CORE
double lumen
double lumen
triple lumen
double lumen
triple lumen
triple lumen
double lumen
triple lumen
double lumen

## 2023-01-02 NOTE — PROGRESS NOTE ADULT - PROBLEM SELECTOR PLAN 2
Hgb below goal.  Iron saturation 22 with Ferritin of 355, will start 5k epo with HD sessions    If you have any questions, please feel free to contact me  Arsalan Cochran  Nephrology Fellow  [phone]; Prefer [employer] TEAMS  (After 5pm or on weekends please page the on-call fellow) Hgb below goal.  Iron saturation 22 with Ferritin of 355, will start 5k epo with HD sessions    If you have any questions, please feel free to contact me  Arsalan Cochran  Nephrology Fellow  [phone]; Prefer [employer] TEAMS  (After 5pm or on weekends please page the on-call fellow) Hgb below goal.  Iron saturation 22 with Ferritin of 355, will start 5k epo with HD sessions    If you have any questions, please feel free to contact me  Arsalan Cochran  Nephrology Fellow  [phone]; Prefer [employer] TEAMS  (After 5pm or on weekends please page the on-call fellow)

## 2023-01-02 NOTE — PROCEDURE NOTE - NSANATOMICLLOCATION_GEN_A_CORE
right/radial artery
left/radial artery
right
left
right/radial artery
left/radial artery
right/axillae
right/radial artery

## 2023-01-02 NOTE — PROCEDURE NOTE - NSASSISTBY_GEN_A_CORE
Myself
Other
Myself
PA
MARY Foster PA/PA
Myself
Attending/Anesthesiologist

## 2023-01-02 NOTE — PROCEDURE NOTE - NSINDICATIONS_GEN_A_CORE
arterial puncture to obtain ABG's
arterial puncture to obtain ABG's/blood sampling
arterial puncture to obtain ABG's/critical patient/monitoring purposes
dialysis/CRRT
emergency venous access
critical patient/monitoring purposes
emergency venous access/hemodynamic monitoring/volume resuscitation
dialysis/CRRT
arterial puncture to obtain ABG's
respiratory distress
arterial puncture to obtain ABG's/blood sampling/critical patient/monitoring purposes
strict intake/output during critical illness
dialysis/CRRT
dialysis/CRRT
emergency venous access/hemodynamic monitoring
critical illness/venous access/volume resuscitation
emergency venous access/hemodynamic monitoring/hypertonic/irritant infusion
critical illness/venous access
arterial puncture to obtain ABG's/blood sampling
critical illness/emergency venous access/hemodynamic monitoring/venous access

## 2023-01-02 NOTE — PROGRESS NOTE ADULT - ATTENDING COMMENTS
I have seen this patient with the fellow and agree with their assessment and plan. I was physically present for significant portions of the evaluation and management (E/M) service provided.  I agree with the above history, physical, and plan which I have reviewed and edited where appropriate.    Trinidad Orozco MD  Pager   Office     Contact me directly via [employer] Teams     (After 5 pm or on weekends please page the on-call fellow/attending, can check [email or web address] for schedule. Login is carmen cantor, schedule under [facility] medicine, psych, derm) I have seen this patient with the fellow and agree with their assessment and plan. I was physically present for significant portions of the evaluation and management (E/M) service provided.  I agree with the above history, physical, and plan which I have reviewed and edited where appropriate.    Trinidad Orozco MD  Pager   Office     Contact me directly via [employer] Teams     (After 5 pm or on weekends please page the on-call fellow/attending, can check [email or web address] for schedule. Login is carmen cantor, schedule under [facility] medicine, psych, derm) I have seen this patient with the fellow and agree with their assessment and plan. I was physically present for significant portions of the evaluation and management (E/M) service provided.  I agree with the above history, physical, and plan which I have reviewed and edited where appropriate.    Trinidad Orozco MD  Pager   Office     Contact me directly via [employer] Teams     (After 5 pm or on weekends please page the on-call fellow/attending, can check [email or web address] for schedule. Login is carmen cantor, schedule under [facility] medicine, psych, derm)

## 2023-01-02 NOTE — PROGRESS NOTE ADULT - SUBJECTIVE AND OBJECTIVE BOX
Patient seen and examined at the bedside.    Remained critically ill on continuous ICU monitoring.      Brief Summary:  61 y/o male with PMH of CABG, DM, HTN, HLD presenting with a STEMI.   He was in cardiogenic shock requiring VA ECMO, respiratory failure requiring tracheostomy, and renal failure requiring RRT.    24 Hour events:      OBJECTIVE:  Vital Signs Last 24 Hrs  T(C): 36.8 (02 Jan 2023 04:00), Max: 37.4 (02 Jan 2023 00:00)  T(F): 98.2 (02 Jan 2023 04:00), Max: 99.3 (02 Jan 2023 00:00)  HR: 102 (02 Jan 2023 06:00) (94 - 112)  BP: --  BP(mean): --  RR: 24 (02 Jan 2023 06:00) (13 - 43)  SpO2: 100% (02 Jan 2023 06:00) (93% - 100%)    Parameters below as of 02 Jan 2023 04:00  Patient On (Oxygen Delivery Method): BiPAP/CPAP    O2 Concentration (%): 50    Mode: CPAP with PS  FiO2: 50  PEEP: 5  PS: 10  ITime: 1  MAP: 8  PIP: 15              Physical Exam:   General: Intubated, multiple lines gtt  Neurology: Nonfocal  Eyes: bilateral pupils equal and reactive   ENT/Neck: +trach, Neck supple, trachea midline, No JVD   Respiratory: rhonchi bilaterally   CV: S1S2, no murmurs        [x] Sinus Rhythm  Abdominal: Softly distended,+BS   Extremities: 1+ pedal edema noted, + peripheral pulses palpable, warm  Skin: No Rashes, Hematoma, Ecchymosis           -------------------------------------------------------------------------------------------------------------------------------                        9.7    11.70 )-----------( 102      ( 02 Jan 2023 01:24 )             31.2     01-02    138  |  97  |  106<H>  ----------------------------<  148<H>  4.7   |  25  |  5.14<H>    Ca    8.5      02 Jan 2023 01:24  Phos  8.6     01-02  Mg     3.0     01-02    TPro  7.2  /  Alb  3.1<L>  /  TBili  0.8  /  DBili  x   /  AST  26  /  ALT  19  /  AlkPhos  80  01-02    LIVER FUNCTIONS - ( 02 Jan 2023 01:24 )  Alb: 3.1 g/dL / Pro: 7.2 g/dL / ALK PHOS: 80 U/L / ALT: 19 U/L / AST: 26 U/L / GGT: x             ABG - ( 02 Jan 2023 03:55 )  pH, Arterial: 7.45  pH, Blood: x     /  pCO2: 38    /  pO2: 181   / HCO3: 26    / Base Excess: 2.3   /  SaO2: 99.6              ------------------------------------------------------------------------------------------------------------------------------  Assessment:  61 y/o M admitted with a STEMI and cardiogenic shock.     Cardiogenic shock s/p VA ECMO decannulated 12/8  Mitral regurgitation s/p Mitral clip x1 12/16/22  Acute respiratory distress/failure s/p Tracheostomy 12/16/22   At high risk for hemodynamic instability  ERIC/Renal failure  Fungemia  Anemia        Plan:   ***Neuro***  PT ongoing.  Pain control with prns  Optimize day/night cycles.      ***Cardiovascular***  At high risk for hemodynamic instability and cardiac arrhythmias.  Off pressors currently.  PO Amiodarone.  ASA /Statin daily      ***Pulmonary***  Postoperative acute respiratory insufficiency  Deep breathing and coughing exercises.  Wean oxygen as able.        ***GI***  Protein calorie malnutrition - Tube feeds via nasal feeding tube  Protonix for stress ulcer prophylaxis   Bowel regimen with senna  Simethicone for gas     ***Renal***  Renal failure - Continue  HD  Replete electrolytes as indicated.  Continue with Retacrit       ***ID***  Candidemia - On Caspofungin      ***Endocrine***  DM with hyperglycemia - insulin ISS, NPH, SubQ    ***Hematology***  Acute blood loss anemia - no transfusion indication currently, will trend.  SQ Heparin for DVT prophylaxis          Patient requires continuous monitoring with bedside rhythm monitoring, pulse oximetry monitoring, and continuous central venous and arterial pressure monitoring; and intermittent blood gas analysis. Care plan discussed with the ICU care team.   Patient remained critical, at risk for life threatening decompensation.    I have spent 30 minutes providing critical care management to this patient.    By signing my name below, I, Leela Avila, attest that this documentation has been prepared under the direction and in the presence of Jade Rosas MD  Electronically signed: Brian Oakes, 01-02-23 @ 06:12    I, Jade Rosas MD, personally performed the services described in this documentation. all medical record entries made by the scribkyle were at my direction and in my presence. I have reviewed the chart and agree that the record reflects my personal performance and is accurate and complete  Electronically signed: Jade Rosas MD Patient seen and examined at the bedside.    Remained critically ill on continuous ICU monitoring.      Brief Summary:  63 y/o male with PMH of CABG, DM, HTN, HLD presenting with a STEMI.   He was in cardiogenic shock requiring VA ECMO, respiratory failure requiring tracheostomy, and renal failure requiring RRT.    24 Hour events:      OBJECTIVE:  Vital Signs Last 24 Hrs  T(C): 36.8 (02 Jan 2023 04:00), Max: 37.4 (02 Jan 2023 00:00)  T(F): 98.2 (02 Jan 2023 04:00), Max: 99.3 (02 Jan 2023 00:00)  HR: 102 (02 Jan 2023 06:00) (94 - 112)  BP: --  BP(mean): --  RR: 24 (02 Jan 2023 06:00) (13 - 43)  SpO2: 100% (02 Jan 2023 06:00) (93% - 100%)    Parameters below as of 02 Jan 2023 04:00  Patient On (Oxygen Delivery Method): BiPAP/CPAP    O2 Concentration (%): 50    Mode: CPAP with PS  FiO2: 50  PEEP: 5  PS: 10  ITime: 1  MAP: 8  PIP: 15              Physical Exam:   General: Intubated, multiple lines gtt  Neurology: Nonfocal  Eyes: bilateral pupils equal and reactive   ENT/Neck: +trach, Neck supple, trachea midline, No JVD   Respiratory: rhonchi bilaterally   CV: S1S2, no murmurs        [x] Sinus Rhythm  Abdominal: Softly distended,+BS   Extremities: 1+ pedal edema noted, + peripheral pulses palpable, warm  Skin: No Rashes, Hematoma, Ecchymosis           -------------------------------------------------------------------------------------------------------------------------------                        9.7    11.70 )-----------( 102      ( 02 Jan 2023 01:24 )             31.2     01-02    138  |  97  |  106<H>  ----------------------------<  148<H>  4.7   |  25  |  5.14<H>    Ca    8.5      02 Jan 2023 01:24  Phos  8.6     01-02  Mg     3.0     01-02    TPro  7.2  /  Alb  3.1<L>  /  TBili  0.8  /  DBili  x   /  AST  26  /  ALT  19  /  AlkPhos  80  01-02    LIVER FUNCTIONS - ( 02 Jan 2023 01:24 )  Alb: 3.1 g/dL / Pro: 7.2 g/dL / ALK PHOS: 80 U/L / ALT: 19 U/L / AST: 26 U/L / GGT: x             ABG - ( 02 Jan 2023 03:55 )  pH, Arterial: 7.45  pH, Blood: x     /  pCO2: 38    /  pO2: 181   / HCO3: 26    / Base Excess: 2.3   /  SaO2: 99.6              ------------------------------------------------------------------------------------------------------------------------------  Assessment:  63 y/o M admitted with a STEMI and cardiogenic shock.     Cardiogenic shock s/p VA ECMO decannulated 12/8  Mitral regurgitation s/p Mitral clip x1 12/16/22  Acute respiratory distress/failure s/p Tracheostomy 12/16/22   At high risk for hemodynamic instability  ERIC/Renal failure  Fungemia  Anemia        Plan:   ***Neuro***  PT ongoing.  Pain control with prns  Optimize day/night cycles.      ***Cardiovascular***  At high risk for hemodynamic instability and cardiac arrhythmias.  Off pressors currently.  PO Amiodarone.  ASA /Statin daily      ***Pulmonary***  Postoperative acute respiratory insufficiency  Deep breathing and coughing exercises.  Wean oxygen as able.        ***GI***  Protein calorie malnutrition - Tube feeds via nasal feeding tube  Protonix for stress ulcer prophylaxis   Bowel regimen with senna  Simethicone for gas     ***Renal***  Renal failure - Continue  HD  Replete electrolytes as indicated.  Continue with Retacrit       ***ID***  Candidemia - On Caspofungin      ***Endocrine***  DM with hyperglycemia - insulin ISS, NPH, SubQ    ***Hematology***  Acute blood loss anemia - no transfusion indication currently, will trend.  SQ Heparin for DVT prophylaxis          Patient requires continuous monitoring with bedside rhythm monitoring, pulse oximetry monitoring, and continuous central venous and arterial pressure monitoring; and intermittent blood gas analysis. Care plan discussed with the ICU care team.   Patient remained critical, at risk for life threatening decompensation.    I have spent 30 minutes providing critical care management to this patient.    By signing my name below, I, Leela Avila, attest that this documentation has been prepared under the direction and in the presence of Jade Rosas MD  Electronically signed: Brian Oakes, 01-02-23 @ 06:12    I, Jade Rosas MD, personally performed the services described in this documentation. all medical record entries made by the scribkyle were at my direction and in my presence. I have reviewed the chart and agree that the record reflects my personal performance and is accurate and complete  Electronically signed: Jade Rosas MD Patient seen and examined at the bedside.    Remained critically ill on continuous ICU monitoring.      Brief Summary:  63 y/o male with PMH of CABG, DM, HTN, HLD presenting with a STEMI.   He was in cardiogenic shock requiring VA ECMO, respiratory failure requiring tracheostomy, and renal failure requiring RRT.    24 Hour events:      OBJECTIVE:  Vital Signs Last 24 Hrs  T(C): 36.8 (02 Jan 2023 04:00), Max: 37.4 (02 Jan 2023 00:00)  T(F): 98.2 (02 Jan 2023 04:00), Max: 99.3 (02 Jan 2023 00:00)  HR: 102 (02 Jan 2023 06:00) (94 - 112)  BP: --  BP(mean): --  RR: 24 (02 Jan 2023 06:00) (13 - 43)  SpO2: 100% (02 Jan 2023 06:00) (93% - 100%)    Parameters below as of 02 Jan 2023 04:00  Patient On (Oxygen Delivery Method): BiPAP/CPAP    O2 Concentration (%): 50    Mode: CPAP with PS  FiO2: 50  PEEP: 5  PS: 10  ITime: 1  MAP: 8  PIP: 15              Physical Exam:   General: NAD on trach   Neurology: Nonfocal  Eyes: bilateral pupils equal and reactive   ENT/Neck: +trach, Neck supple, trachea midline, No JVD   Respiratory: rhonchi bilaterally   CV: S1S2, no murmurs        [x] Sinus Rhythm  Abdominal: Softly distended,+BS   Extremities: 1+ pedal edema noted, + peripheral pulses palpable, warm  Skin: No Rashes, Hematoma, Ecchymosis           -------------------------------------------------------------------------------------------------------------------------------                        9.7    11.70 )-----------( 102      ( 02 Jan 2023 01:24 )             31.2     01-02    138  |  97  |  106<H>  ----------------------------<  148<H>  4.7   |  25  |  5.14<H>    Ca    8.5      02 Jan 2023 01:24  Phos  8.6     01-02  Mg     3.0     01-02    TPro  7.2  /  Alb  3.1<L>  /  TBili  0.8  /  DBili  x   /  AST  26  /  ALT  19  /  AlkPhos  80  01-02    LIVER FUNCTIONS - ( 02 Jan 2023 01:24 )  Alb: 3.1 g/dL / Pro: 7.2 g/dL / ALK PHOS: 80 U/L / ALT: 19 U/L / AST: 26 U/L / GGT: x             ABG - ( 02 Jan 2023 03:55 )  pH, Arterial: 7.45  pH, Blood: x     /  pCO2: 38    /  pO2: 181   / HCO3: 26    / Base Excess: 2.3   /  SaO2: 99.6              ------------------------------------------------------------------------------------------------------------------------------  Assessment:  63 y/o M admitted with a STEMI and cardiogenic shock.     Cardiogenic shock s/p VA ECMO decannulated 12/8  Mitral regurgitation s/p Mitral clip x1 12/16/22  Acute respiratory distress/failure s/p Tracheostomy 12/16/22   At high risk for hemodynamic instability  ERIC/Renal failure  Fungemia  Anemia        Plan:   ***Neuro***  PT ongoing.  Pain control with prns  Optimize day/night cycles.      ***Cardiovascular***  At high risk for hemodynamic instability and cardiac arrhythmias.  Off pressors currently.  PO Amiodarone.  ASA /Statin daily      ***Pulmonary***  Postoperative acute respiratory insufficiency  Deep breathing and coughing exercises.  Wean oxygen as able.        ***GI***  Protein calorie malnutrition - Tube feeds via nasal feeding tube  Protonix for stress ulcer prophylaxis   Bowel regimen with senna  Simethicone for gas     ***Renal***  Renal failure - Continue  HD  Replete electrolytes as indicated.  Continue with Retacrit       ***ID***  Candidemia - On Caspofungin      ***Endocrine***  DM with hyperglycemia - insulin ISS, NPH, SubQ    ***Hematology***  Acute blood loss anemia - no transfusion indication currently, will trend.  SQ Heparin for DVT prophylaxis          Patient requires continuous monitoring with bedside rhythm monitoring, pulse oximetry monitoring, and continuous central venous and arterial pressure monitoring; and intermittent blood gas analysis. Care plan discussed with the ICU care team.   Patient remained critical, at risk for life threatening decompensation.    I have spent 30 minutes providing critical care management to this patient.    By signing my name below, I, Leela Avila, attest that this documentation has been prepared under the direction and in the presence of Jade Rosas MD  Electronically signed: Brian Oakes, 01-02-23 @ 06:12    I, Jade Rosas MD, personally performed the services described in this documentation. all medical record entries made by the karlaibkyle were at my direction and in my presence. I have reviewed the chart and agree that the record reflects my personal performance and is accurate and complete  Electronically signed: Jade Rosas MD Patient seen and examined at the bedside.    Remained critically ill on continuous ICU monitoring.      Brief Summary:  61 y/o male with PMH of CABG, DM, HTN, HLD presenting with a STEMI.   He was in cardiogenic shock requiring VA ECMO, respiratory failure requiring tracheostomy, and renal failure requiring RRT.    24 Hour events:      OBJECTIVE:  Vital Signs Last 24 Hrs  T(C): 36.8 (02 Jan 2023 04:00), Max: 37.4 (02 Jan 2023 00:00)  T(F): 98.2 (02 Jan 2023 04:00), Max: 99.3 (02 Jan 2023 00:00)  HR: 102 (02 Jan 2023 06:00) (94 - 112)  BP: --  BP(mean): --  RR: 24 (02 Jan 2023 06:00) (13 - 43)  SpO2: 100% (02 Jan 2023 06:00) (93% - 100%)    Parameters below as of 02 Jan 2023 04:00  Patient On (Oxygen Delivery Method): BiPAP/CPAP    O2 Concentration (%): 50    Mode: CPAP with PS  FiO2: 50  PEEP: 5  PS: 10  ITime: 1  MAP: 8  PIP: 15              Physical Exam:   General: NAD on trach   Neurology: Nonfocal  Eyes: bilateral pupils equal and reactive   ENT/Neck: +trach, Neck supple, trachea midline, No JVD   Respiratory: rhonchi bilaterally   CV: S1S2, no murmurs        [x] Sinus Rhythm  Abdominal: Softly distended,+BS   Extremities: 1+ pedal edema noted, + peripheral pulses palpable, warm  Skin: No Rashes, Hematoma, Ecchymosis           -------------------------------------------------------------------------------------------------------------------------------                        9.7    11.70 )-----------( 102      ( 02 Jan 2023 01:24 )             31.2     01-02    138  |  97  |  106<H>  ----------------------------<  148<H>  4.7   |  25  |  5.14<H>    Ca    8.5      02 Jan 2023 01:24  Phos  8.6     01-02  Mg     3.0     01-02    TPro  7.2  /  Alb  3.1<L>  /  TBili  0.8  /  DBili  x   /  AST  26  /  ALT  19  /  AlkPhos  80  01-02    LIVER FUNCTIONS - ( 02 Jan 2023 01:24 )  Alb: 3.1 g/dL / Pro: 7.2 g/dL / ALK PHOS: 80 U/L / ALT: 19 U/L / AST: 26 U/L / GGT: x             ABG - ( 02 Jan 2023 03:55 )  pH, Arterial: 7.45  pH, Blood: x     /  pCO2: 38    /  pO2: 181   / HCO3: 26    / Base Excess: 2.3   /  SaO2: 99.6              ------------------------------------------------------------------------------------------------------------------------------  Assessment:  61 y/o M admitted with a STEMI and cardiogenic shock.     Cardiogenic shock s/p VA ECMO decannulated 12/8  Mitral regurgitation s/p Mitral clip x1 12/16/22  Acute respiratory distress/failure s/p Tracheostomy 12/16/22   At high risk for hemodynamic instability  ERIC/Renal failure  Fungemia  Anemia        Plan:   ***Neuro***  PT ongoing.  Pain control with prns  Optimize day/night cycles.      ***Cardiovascular***  At high risk for hemodynamic instability and cardiac arrhythmias.  Off pressors currently.  PO Amiodarone.  ASA /Statin daily      ***Pulmonary***  Postoperative acute respiratory insufficiency  Deep breathing and coughing exercises.  Wean oxygen as able.        ***GI***  Protein calorie malnutrition - Tube feeds via nasal feeding tube  Protonix for stress ulcer prophylaxis   Bowel regimen with senna  Simethicone for gas     ***Renal***  Renal failure - Continue  HD  Replete electrolytes as indicated.  Continue with Retacrit       ***ID***  Candidemia - On Caspofungin      ***Endocrine***  DM with hyperglycemia - insulin ISS, NPH, SubQ    ***Hematology***  Acute blood loss anemia - no transfusion indication currently, will trend.  SQ Heparin for DVT prophylaxis          Patient requires continuous monitoring with bedside rhythm monitoring, pulse oximetry monitoring, and continuous central venous and arterial pressure monitoring; and intermittent blood gas analysis. Care plan discussed with the ICU care team.   Patient remained critical, at risk for life threatening decompensation.    I have spent 30 minutes providing critical care management to this patient.    By signing my name below, I, Leela Avila, attest that this documentation has been prepared under the direction and in the presence of Jade Rosas MD  Electronically signed: Brian Oakes, 01-02-23 @ 06:12    I, Jade Rosas MD, personally performed the services described in this documentation. all medical record entries made by the karlaibkyle were at my direction and in my presence. I have reviewed the chart and agree that the record reflects my personal performance and is accurate and complete  Electronically signed: Jade Rosas MD Patient seen and examined at the bedside.    Remained critically ill on continuous ICU monitoring.      Brief Summary:  63 y/o male with PMH of CABG, DM, HTN, HLD presenting with a STEMI.   He was in cardiogenic shock requiring VA ECMO, respiratory failure requiring tracheostomy, and renal failure requiring RRT.    24 Hour events:      OBJECTIVE:  Vital Signs Last 24 Hrs  T(C): 36.8 (02 Jan 2023 04:00), Max: 37.4 (02 Jan 2023 00:00)  T(F): 98.2 (02 Jan 2023 04:00), Max: 99.3 (02 Jan 2023 00:00)  HR: 102 (02 Jan 2023 06:00) (94 - 112)  BP: --  BP(mean): --  RR: 24 (02 Jan 2023 06:00) (13 - 43)  SpO2: 100% (02 Jan 2023 06:00) (93% - 100%)    Parameters below as of 02 Jan 2023 04:00  Patient On (Oxygen Delivery Method): BiPAP/CPAP    O2 Concentration (%): 50    Mode: CPAP with PS  FiO2: 50  PEEP: 5  PS: 10  ITime: 1  MAP: 8  PIP: 15              Physical Exam:   General: NAD on trach   Neurology: Nonfocal  Eyes: bilateral pupils equal and reactive   ENT/Neck: +trach, Neck supple, trachea midline, No JVD   Respiratory: rhonchi bilaterally   CV: S1S2, no murmurs        [x] Sinus Rhythm  Abdominal: Softly distended,+BS   Extremities: 1+ pedal edema noted, + peripheral pulses palpable, warm  Skin: No Rashes, Hematoma, Ecchymosis           -------------------------------------------------------------------------------------------------------------------------------                        9.7    11.70 )-----------( 102      ( 02 Jan 2023 01:24 )             31.2     01-02    138  |  97  |  106<H>  ----------------------------<  148<H>  4.7   |  25  |  5.14<H>    Ca    8.5      02 Jan 2023 01:24  Phos  8.6     01-02  Mg     3.0     01-02    TPro  7.2  /  Alb  3.1<L>  /  TBili  0.8  /  DBili  x   /  AST  26  /  ALT  19  /  AlkPhos  80  01-02    LIVER FUNCTIONS - ( 02 Jan 2023 01:24 )  Alb: 3.1 g/dL / Pro: 7.2 g/dL / ALK PHOS: 80 U/L / ALT: 19 U/L / AST: 26 U/L / GGT: x             ABG - ( 02 Jan 2023 03:55 )  pH, Arterial: 7.45  pH, Blood: x     /  pCO2: 38    /  pO2: 181   / HCO3: 26    / Base Excess: 2.3   /  SaO2: 99.6              ------------------------------------------------------------------------------------------------------------------------------  Assessment:  63 y/o M admitted with a STEMI and cardiogenic shock.     Cardiogenic shock s/p VA ECMO decannulated 12/8  Mitral regurgitation s/p Mitral clip x1 12/16/22  Acute respiratory distress/failure s/p Tracheostomy 12/16/22   At high risk for hemodynamic instability  ERIC/Renal failure  Fungemia  Anemia        Plan:   ***Neuro***  PT ongoing.  Pain control with prns  Optimize day/night cycles.      ***Cardiovascular***  At high risk for hemodynamic instability and cardiac arrhythmias.  Off pressors currently.  PO Amiodarone.  ASA /Statin daily      ***Pulmonary***  Postoperative acute respiratory insufficiency  Deep breathing and coughing exercises.  Wean oxygen as able.        ***GI***  Protein calorie malnutrition - Tube feeds via nasal feeding tube  Protonix for stress ulcer prophylaxis   Bowel regimen with senna  Simethicone for gas     ***Renal***  Renal failure - Continue  HD  Replete electrolytes as indicated.  Continue with Retacrit       ***ID***  Candidemia - On Caspofungin      ***Endocrine***  DM with hyperglycemia - insulin ISS, NPH, SubQ    ***Hematology***  Acute blood loss anemia - no transfusion indication currently, will trend.  SQ Heparin for DVT prophylaxis          Patient requires continuous monitoring with bedside rhythm monitoring, pulse oximetry monitoring, and continuous central venous and arterial pressure monitoring; and intermittent blood gas analysis. Care plan discussed with the ICU care team.   Patient remained critical, at risk for life threatening decompensation.    I have spent 30 minutes providing critical care management to this patient.    By signing my name below, I, Leela Avila, attest that this documentation has been prepared under the direction and in the presence of Jade Rosas MD  Electronically signed: Brian Okaes, 01-02-23 @ 06:12    I, Jade Rosas MD, personally performed the services described in this documentation. all medical record entries made by the karlaibkyle were at my direction and in my presence. I have reviewed the chart and agree that the record reflects my personal performance and is accurate and complete  Electronically signed: Jade Rosas MD Patient seen and examined at the bedside.    Remained critically ill on continuous ICU monitoring.      Brief Summary:  63 y/o male with PMH of CABG, DM, HTN, HLD presenting with a STEMI.   He was in cardiogenic shock requiring VA ECMO, respiratory failure requiring tracheostomy, and renal failure requiring RRT.    24 Hour events:  - On CPAP now on TC   - Neurologically intact and responsive   -     OBJECTIVE:  Vital Signs Last 24 Hrs  T(C): 36.8 (02 Jan 2023 04:00), Max: 37.4 (02 Jan 2023 00:00)  T(F): 98.2 (02 Jan 2023 04:00), Max: 99.3 (02 Jan 2023 00:00)  HR: 102 (02 Jan 2023 06:00) (94 - 112)  BP: --  BP(mean): --  RR: 24 (02 Jan 2023 06:00) (13 - 43)  SpO2: 100% (02 Jan 2023 06:00) (93% - 100%)    Parameters below as of 02 Jan 2023 04:00  Patient On (Oxygen Delivery Method): BiPAP/CPAP    O2 Concentration (%): 50    Mode: CPAP with PS  FiO2: 50  PEEP: 5  PS: 10  ITime: 1  MAP: 8  PIP: 15              Physical Exam:   General: NAD on trach   Neurology: Nonfocal  Eyes: bilateral pupils equal and reactive   ENT/Neck: +trach, Neck supple, trachea midline, No JVD   Respiratory: rhonchi bilaterally   CV: S1S2, no murmurs        [x] Sinus Rhythm  Abdominal: Softly distended,+BS   Extremities: 1+ pedal edema noted, + peripheral pulses palpable, warm  Skin: No Rashes, Hematoma, Ecchymosis           -------------------------------------------------------------------------------------------------------------------------------                        9.7    11.70 )-----------( 102      ( 02 Jan 2023 01:24 )             31.2     01-02    138  |  97  |  106<H>  ----------------------------<  148<H>  4.7   |  25  |  5.14<H>    Ca    8.5      02 Jan 2023 01:24  Phos  8.6     01-02  Mg     3.0     01-02    TPro  7.2  /  Alb  3.1<L>  /  TBili  0.8  /  DBili  x   /  AST  26  /  ALT  19  /  AlkPhos  80  01-02    LIVER FUNCTIONS - ( 02 Jan 2023 01:24 )  Alb: 3.1 g/dL / Pro: 7.2 g/dL / ALK PHOS: 80 U/L / ALT: 19 U/L / AST: 26 U/L / GGT: x             ABG - ( 02 Jan 2023 03:55 )  pH, Arterial: 7.45  pH, Blood: x     /  pCO2: 38    /  pO2: 181   / HCO3: 26    / Base Excess: 2.3   /  SaO2: 99.6              ------------------------------------------------------------------------------------------------------------------------------  Assessment:  63 y/o M admitted with a STEMI and cardiogenic shock.     Cardiogenic shock s/p VA ECMO decannulated 12/8  Mitral regurgitation s/p Mitral clip x1 12/16/22  Acute respiratory distress/failure s/p Tracheostomy 12/16/22   At high risk for hemodynamic instability  ERIC/Renal failure  Fungemia  Anemia  Thrombocytopenia         Plan:   ***Neuro***  PT ongoing.  Pain control with prns  Optimize day/night cycles.      ***Cardiovascular***  At high risk for hemodynamic instability and cardiac - TC  arrhythmias.  Off pressors currently.  PO Amiodarone.  ASA /Statin daily      ***Pulmonary***  Postoperative acute respiratory insufficiency  Deep breathing and coughing exercises.  Wean oxygen as able.        ***GI***  Protein calorie malnutrition - Tube feeds via nasal feeding tube  Protonix for stress ulcer prophylaxis   Bowel regimen with senna  Simethicone for gas     ***Renal***  Renal failure - Continue HD today  Replete electrolytes as indicated.  Continue with Retacrit       ***ID***  Candidemia - On Caspofungin  12/26 Bcx +clinton 12/27 12/29 BCx NGTD   12/30 UCx NGTD 1/1 SCx NGTD     ***Endocrine***  DM with hyperglycemia - insulin ISS, NPH, SubQ    ***Hematology***  Acute blood loss anemia and thrombocytopenia- no transfusion indication currently, will trend.  SQ Heparin for DVT prophylaxis          Patient requires continuous monitoring with bedside rhythm monitoring, pulse oximetry monitoring, and continuous central venous and arterial pressure monitoring; and intermittent blood gas analysis. Care plan discussed with the ICU care team.   Patient remained critical, at risk for life threatening decompensation.    I have spent 30 minutes providing critical care management to this patient.    By signing my name below, I, Leela Avila, attest that this documentation has been prepared under the direction and in the presence of Jade Rosas MD  Electronically signed: Brian Oakes, 01-02-23 @ 06:12    IJade MD, personally performed the services described in this documentation. all medical record entries made by the karlaibkyle were at my direction and in my presence. I have reviewed the chart and agree that the record reflects my personal performance and is accurate and complete  Electronically signed: Jade Rosas MD Patient seen and examined at the bedside.    Remained critically ill on continuous ICU monitoring.      Brief Summary:  61 y/o male with PMH of CABG, DM, HTN, HLD presenting with a STEMI.   He was in cardiogenic shock requiring VA ECMO, respiratory failure requiring tracheostomy, and renal failure requiring RRT.    24 Hour events:  - On CPAP now on TC   - Neurologically intact and responsive   -     OBJECTIVE:  Vital Signs Last 24 Hrs  T(C): 36.8 (02 Jan 2023 04:00), Max: 37.4 (02 Jan 2023 00:00)  T(F): 98.2 (02 Jan 2023 04:00), Max: 99.3 (02 Jan 2023 00:00)  HR: 102 (02 Jan 2023 06:00) (94 - 112)  BP: --  BP(mean): --  RR: 24 (02 Jan 2023 06:00) (13 - 43)  SpO2: 100% (02 Jan 2023 06:00) (93% - 100%)    Parameters below as of 02 Jan 2023 04:00  Patient On (Oxygen Delivery Method): BiPAP/CPAP    O2 Concentration (%): 50    Mode: CPAP with PS  FiO2: 50  PEEP: 5  PS: 10  ITime: 1  MAP: 8  PIP: 15              Physical Exam:   General: NAD on trach   Neurology: Nonfocal  Eyes: bilateral pupils equal and reactive   ENT/Neck: +trach, Neck supple, trachea midline, No JVD   Respiratory: rhonchi bilaterally   CV: S1S2, no murmurs        [x] Sinus Rhythm  Abdominal: Softly distended,+BS   Extremities: 1+ pedal edema noted, + peripheral pulses palpable, warm  Skin: No Rashes, Hematoma, Ecchymosis           -------------------------------------------------------------------------------------------------------------------------------                        9.7    11.70 )-----------( 102      ( 02 Jan 2023 01:24 )             31.2     01-02    138  |  97  |  106<H>  ----------------------------<  148<H>  4.7   |  25  |  5.14<H>    Ca    8.5      02 Jan 2023 01:24  Phos  8.6     01-02  Mg     3.0     01-02    TPro  7.2  /  Alb  3.1<L>  /  TBili  0.8  /  DBili  x   /  AST  26  /  ALT  19  /  AlkPhos  80  01-02    LIVER FUNCTIONS - ( 02 Jan 2023 01:24 )  Alb: 3.1 g/dL / Pro: 7.2 g/dL / ALK PHOS: 80 U/L / ALT: 19 U/L / AST: 26 U/L / GGT: x             ABG - ( 02 Jan 2023 03:55 )  pH, Arterial: 7.45  pH, Blood: x     /  pCO2: 38    /  pO2: 181   / HCO3: 26    / Base Excess: 2.3   /  SaO2: 99.6              ------------------------------------------------------------------------------------------------------------------------------  Assessment:  61 y/o M admitted with a STEMI and cardiogenic shock.     Cardiogenic shock s/p VA ECMO decannulated 12/8  Mitral regurgitation s/p Mitral clip x1 12/16/22  Acute respiratory distress/failure s/p Tracheostomy 12/16/22   At high risk for hemodynamic instability  ERIC/Renal failure  Fungemia  Anemia  Thrombocytopenia         Plan:   ***Neuro***  PT ongoing.  Pain control with prns  Optimize day/night cycles.      ***Cardiovascular***  At high risk for hemodynamic instability and cardiac - TC  arrhythmias.  Off pressors currently.  PO Amiodarone.  ASA /Statin daily      ***Pulmonary***  Postoperative acute respiratory insufficiency  Deep breathing and coughing exercises.  Wean oxygen as able.        ***GI***  Protein calorie malnutrition - Tube feeds via nasal feeding tube  Protonix for stress ulcer prophylaxis   Bowel regimen with senna  Simethicone for gas     ***Renal***  Renal failure - Continue HD today  Replete electrolytes as indicated.  Continue with Retacrit       ***ID***  Candidemia - On Caspofungin  12/26 Bcx +cl[place] 12/27 12/29 BCx NGTD   12/30 UCx NGTD 1/1 SCx NGTD     ***Endocrine***  DM with hyperglycemia - insulin ISS, NPH, SubQ    ***Hematology***  Acute blood loss anemia and thrombocytopenia- no transfusion indication currently, will trend.  SQ Heparin for DVT prophylaxis          Patient requires continuous monitoring with bedside rhythm monitoring, pulse oximetry monitoring, and continuous central venous and arterial pressure monitoring; and intermittent blood gas analysis. Care plan discussed with the ICU care team.   Patient remained critical, at risk for life threatening decompensation.    I have spent 30 minutes providing critical care management to this patient.    By signing my name below, I, Leela Avila, attest that this documentation has been prepared under the direction and in the presence of Jade Rosas MD  Electronically signed: Brian Oakes, 01-02-23 @ 06:12    IJade MD, personally performed the services described in this documentation. all medical record entries made by the karlaibkyle were at my direction and in my presence. I have reviewed the chart and agree that the record reflects my personal performance and is accurate and complete  Electronically signed: Jade Rosas MD Patient seen and examined at the bedside.    Remains critically ill on continuous ICU monitoring.      Brief Summary:  61 y/o male with PMH of CABG, DM, HTN, HLD presenting with a STEMI.   He was in cardiogenic shock requiring VA ECMO, respiratory failure requiring tracheostomy, and renal failure requiring RRT.    24 Hour events:  - Tolerating 19 hours/day of trach collar.  - HD yesterday and again today.  - WBC increased - lines changed.      OBJECTIVE:  Vital Signs Last 24 Hrs  T(C): 36.8 (02 Jan 2023 04:00), Max: 37.4 (02 Jan 2023 00:00)  T(F): 98.2 (02 Jan 2023 04:00), Max: 99.3 (02 Jan 2023 00:00)  HR: 102 (02 Jan 2023 06:00) (94 - 112)  BP: --  BP(mean): --  RR: 24 (02 Jan 2023 06:00) (13 - 43)  SpO2: 100% (02 Jan 2023 06:00) (93% - 100%)    Parameters below as of 02 Jan 2023 04:00  Patient On (Oxygen Delivery Method): BiPAP/CPAP    O2 Concentration (%): 50    Mode: CPAP with PS  FiO2: 50  PEEP: 5  PS: 10  ITime: 1  MAP: 8  PIP: 15              Physical Exam:   General: Sitting in chair, no acute distress   Neurology: Following commands, alert and interactive  Eyes: Bilateral pupils equal and reactive   ENT/Neck: Trach collar  Respiratory: Breath sounds bilaterally  CV: Sinus Rhythm  Abdominal: Soft, nontender  Extremities: Warm          -------------------------------------------------------------------------------------------------------------------------------                        9.7    11.70 )-----------( 102      ( 02 Jan 2023 01:24 )             31.2     01-02    138  |  97  |  106<H>  ----------------------------<  148<H>  4.7   |  25  |  5.14<H>    Ca    8.5      02 Jan 2023 01:24  Phos  8.6     01-02  Mg     3.0     01-02    TPro  7.2  /  Alb  3.1<L>  /  TBili  0.8  /  DBili  x   /  AST  26  /  ALT  19  /  AlkPhos  80  01-02    LIVER FUNCTIONS - ( 02 Jan 2023 01:24 )  Alb: 3.1 g/dL / Pro: 7.2 g/dL / ALK PHOS: 80 U/L / ALT: 19 U/L / AST: 26 U/L / GGT: x             ABG - ( 02 Jan 2023 03:55 )  pH, Arterial: 7.45  pH, Blood: x     /  pCO2: 38    /  pO2: 181   / HCO3: 26    / Base Excess: 2.3   /  SaO2: 99.6          ------------------------------------------------------------------------------------------------------------------------------  Assessment:  61 y/o M admitted with a STEMI and cardiogenic shock.     Cardiogenic shock s/p VA ECMO decannulated 12/8  Mitral regurgitation s/p Mitral clip x1 12/16/22  Acute respiratory distress/failure s/p Tracheostomy 12/16/22   At high risk for hemodynamic instability  ERIC/Renal failure  Fungemia  Anemia  Thrombocytopenia         Plan:   ***Neuro***  PT ongoing.  Pain control with prns  Optimize day/night cycles.    ***Cardiovascular***  At high risk for hemodynamic instability and cardiac arrhythmias.  Off pressors currently.  PO Amiodarone.  ASA /Statin daily    ***Pulmonary***  Respiratory failure s/p tracheostomy  Long trach collar trials.  Deep breathing and coughing exercises.  Wean oxygen as able.    ***GI***  Protein calorie malnutrition - Tube feeds via nasal feeding tube  Protonix for stress ulcer prophylaxis   Bowel regimen with senna    ***Renal***  Renal failure - HD again today - plan for removal of 1 kg if BP tolerates.  Replete electrolytes as indicated.  Continue with Retacrit     ***ID***  Candidemia - On Caspofungin  12/26 Bcx +[place] 12/27 12/29 BCx NGTD   12/30 UCx NGTD 1/1 SCx NGTD     ***Endocrine***  DM with hyperglycemia - Insulin ISS, NPH    ***Hematology***  Acute blood loss anemia and thrombocytopenia- no transfusion indication currently, will trend.  SQ Heparin for DVT prophylaxis          Patient requires continuous monitoring with bedside rhythm monitoring, pulse oximetry monitoring, and continuous central venous and arterial pressure monitoring; and intermittent blood gas analysis. Care plan discussed with the ICU care team.   Patient remained critical, at risk for life threatening decompensation.    I have spent 35 minutes providing critical care management to this patient.    By signing my name below, I, Leela Avila, attest that this documentation has been prepared under the direction and in the presence of Jade Rosas MD  Electronically signed: Brian Oakes, 01-02-23 @ 06:12    I, Jade Rosas MD, personally performed the services described in this documentation. all medical record entries made by the scribe were at my direction and in my presence. I have reviewed the chart and agree that the record reflects my personal performance and is accurate and complete  Electronically signed: Jade Rosas MD Patient seen and examined at the bedside.    Remains critically ill on continuous ICU monitoring.      Brief Summary:  63 y/o male with PMH of CABG, DM, HTN, HLD presenting with a STEMI.   He was in cardiogenic shock requiring VA ECMO, respiratory failure requiring tracheostomy, and renal failure requiring RRT.    24 Hour events:  - Tolerating 19 hours/day of trach collar.  - HD yesterday and again today.  - WBC increased - lines changed.      OBJECTIVE:  Vital Signs Last 24 Hrs  T(C): 36.8 (02 Jan 2023 04:00), Max: 37.4 (02 Jan 2023 00:00)  T(F): 98.2 (02 Jan 2023 04:00), Max: 99.3 (02 Jan 2023 00:00)  HR: 102 (02 Jan 2023 06:00) (94 - 112)  BP: --  BP(mean): --  RR: 24 (02 Jan 2023 06:00) (13 - 43)  SpO2: 100% (02 Jan 2023 06:00) (93% - 100%)    Parameters below as of 02 Jan 2023 04:00  Patient On (Oxygen Delivery Method): BiPAP/CPAP    O2 Concentration (%): 50    Mode: CPAP with PS  FiO2: 50  PEEP: 5  PS: 10  ITime: 1  MAP: 8  PIP: 15              Physical Exam:   General: Sitting in chair, no acute distress   Neurology: Following commands, alert and interactive  Eyes: Bilateral pupils equal and reactive   ENT/Neck: Trach collar  Respiratory: Breath sounds bilaterally  CV: Sinus Rhythm  Abdominal: Soft, nontender  Extremities: Warm          -------------------------------------------------------------------------------------------------------------------------------                        9.7    11.70 )-----------( 102      ( 02 Jan 2023 01:24 )             31.2     01-02    138  |  97  |  106<H>  ----------------------------<  148<H>  4.7   |  25  |  5.14<H>    Ca    8.5      02 Jan 2023 01:24  Phos  8.6     01-02  Mg     3.0     01-02    TPro  7.2  /  Alb  3.1<L>  /  TBili  0.8  /  DBili  x   /  AST  26  /  ALT  19  /  AlkPhos  80  01-02    LIVER FUNCTIONS - ( 02 Jan 2023 01:24 )  Alb: 3.1 g/dL / Pro: 7.2 g/dL / ALK PHOS: 80 U/L / ALT: 19 U/L / AST: 26 U/L / GGT: x             ABG - ( 02 Jan 2023 03:55 )  pH, Arterial: 7.45  pH, Blood: x     /  pCO2: 38    /  pO2: 181   / HCO3: 26    / Base Excess: 2.3   /  SaO2: 99.6          ------------------------------------------------------------------------------------------------------------------------------  Assessment:  63 y/o M admitted with a STEMI and cardiogenic shock.     Cardiogenic shock s/p VA ECMO decannulated 12/8  Mitral regurgitation s/p Mitral clip x1 12/16/22  Acute respiratory distress/failure s/p Tracheostomy 12/16/22   At high risk for hemodynamic instability  ERIC/Renal failure  Fungemia  Anemia  Thrombocytopenia         Plan:   ***Neuro***  PT ongoing.  Pain control with prns  Optimize day/night cycles.    ***Cardiovascular***  At high risk for hemodynamic instability and cardiac arrhythmias.  Off pressors currently.  PO Amiodarone.  ASA /Statin daily    ***Pulmonary***  Respiratory failure s/p tracheostomy  Long trach collar trials.  Deep breathing and coughing exercises.  Wean oxygen as able.    ***GI***  Protein calorie malnutrition - Tube feeds via nasal feeding tube  Protonix for stress ulcer prophylaxis   Bowel regimen with senna    ***Renal***  Renal failure - HD again today - plan for removal of 1 kg if BP tolerates.  Replete electrolytes as indicated.  Continue with Retacrit     ***ID***  Candidemia - On Caspofungin  12/26 Bcx +cl[place] 12/27 12/29 BCx NGTD   12/30 UCx NGTD 1/1 SCx NGTD     ***Endocrine***  DM with hyperglycemia - Insulin ISS, NPH    ***Hematology***  Acute blood loss anemia and thrombocytopenia- no transfusion indication currently, will trend.  SQ Heparin for DVT prophylaxis          Patient requires continuous monitoring with bedside rhythm monitoring, pulse oximetry monitoring, and continuous central venous and arterial pressure monitoring; and intermittent blood gas analysis. Care plan discussed with the ICU care team.   Patient remained critical, at risk for life threatening decompensation.    I have spent 35 minutes providing critical care management to this patient.    By signing my name below, I, Leela Avila, attest that this documentation has been prepared under the direction and in the presence of Jade Rosas MD  Electronically signed: Brian Oakes, 01-02-23 @ 06:12    I, Jade Rosas MD, personally performed the services described in this documentation. all medical record entries made by the scribe were at my direction and in my presence. I have reviewed the chart and agree that the record reflects my personal performance and is accurate and complete  Electronically signed: Jade Rosas MD

## 2023-01-02 NOTE — PROCEDURE NOTE - ESTIMATED BLOOD LOSS
None
None
Minimal
Minimal
None
Minimal
None
Minimal
None
None
Minimal
None
Minimal

## 2023-01-02 NOTE — PROCEDURE NOTE - NSPROCDETAILS_GEN_ALL_CORE
location identified, draped/prepped, sterile technique used, needle inserted/introduced/positive blood return obtained via catheter/connected to a pressurized flush line/sutured in place/hemostasis with direct pressure, dressing applied/Seldinger technique/all materials/supplies accounted for at end of procedure
location identified, draped/prepped, sterile technique used, needle inserted/introduced/positive blood return obtained via catheter/connected to a pressurized flush line/sutured in place/hemostasis with direct pressure, dressing applied/Seldinger technique/all materials/supplies accounted for at end of procedure
guidewire recovered/lumen(s) aspirated and flushed/sterile dressing applied/sterile technique, catheter placed/ultrasound guidance with use of sterile gel and probe cove
patient pre-oxygenated, tube inserted, placement confirmed
guidewire recovered/lumen(s) aspirated and flushed/sterile dressing applied/sterile technique, catheter placed/ultrasound guidance with use of sterile gel and probe cove
sterile technique, indwelling urinary device inserted
guidewire recovered/lumen(s) aspirated and flushed/sterile dressing applied/sterile technique, catheter placed/ultrasound guidance with use of sterile gel and probe cove
guidewire recovered/lumen(s) aspirated and flushed/ultrasound guidance with use of sterile gel and probe cove
guidewire recovered/lumen(s) aspirated and flushed/sterile dressing applied/sterile technique, catheter placed
guidewire recovered/lumen(s) aspirated and flushed/sterile dressing applied/sterile technique, catheter placed/ultrasound guidance with use of sterile gel and probe cove
location identified, draped/prepped, sterile technique used, needle inserted/introduced/positive blood return obtained via catheter/connected to a pressurized flush line/sutured in place/hemostasis with direct pressure, dressing applied/Seldinger technique/all materials/supplies accounted for at end of procedure
location identified, draped/prepped, sterile technique used, needle inserted/introduced/positive blood return obtained via catheter/connected to a pressurized flush line/hemostasis with direct pressure, dressing applied/Seldinger technique/all materials/supplies accounted for at end of procedure
guidewire recovered/lumen(s) aspirated and flushed/sterile dressing applied/sterile technique, catheter placed/ultrasound guidance with use of sterile gel and probe cove
positive blood return obtained via catheter/connected to a pressurized flush line/sutured in place/hemostasis with direct pressure, dressing applied/Seldinger technique/all materials/supplies accounted for at end of procedure
guidewire recovered/lumen(s) aspirated and flushed/sterile dressing applied/sterile technique, catheter placed/ultrasound guidance with use of sterile gel and probe cove

## 2023-01-03 LAB
-  STAPHYLOCOCCUS EPIDERMIDIS, METHICILLIN RESISTANT: SIGNIFICANT CHANGE UP
ALBUMIN SERPL ELPH-MCNC: 3 G/DL — LOW (ref 3.3–5)
ALBUMIN SERPL ELPH-MCNC: 3.6 G/DL — SIGNIFICANT CHANGE UP (ref 3.3–5)
ALP SERPL-CCNC: 71 U/L — SIGNIFICANT CHANGE UP (ref 40–120)
ALP SERPL-CCNC: 77 U/L — SIGNIFICANT CHANGE UP (ref 40–120)
ALT FLD-CCNC: 17 U/L — SIGNIFICANT CHANGE UP (ref 10–45)
ALT FLD-CCNC: 19 U/L — SIGNIFICANT CHANGE UP (ref 10–45)
AMYLASE P1 CFR SERPL: 306 U/L — HIGH (ref 25–125)
ANION GAP SERPL CALC-SCNC: 13 MMOL/L — SIGNIFICANT CHANGE UP (ref 5–17)
ANION GAP SERPL CALC-SCNC: 14 MMOL/L — SIGNIFICANT CHANGE UP (ref 5–17)
AST SERPL-CCNC: 26 U/L — SIGNIFICANT CHANGE UP (ref 10–40)
AST SERPL-CCNC: 29 U/L — SIGNIFICANT CHANGE UP (ref 10–40)
BILIRUB SERPL-MCNC: 0.8 MG/DL — SIGNIFICANT CHANGE UP (ref 0.2–1.2)
BUN SERPL-MCNC: 54 MG/DL — HIGH (ref 7–23)
BUN SERPL-MCNC: 65 MG/DL — HIGH (ref 7–23)
CALCIUM SERPL-MCNC: 8.3 MG/DL — LOW (ref 8.4–10.5)
CALCIUM SERPL-MCNC: 8.6 MG/DL — SIGNIFICANT CHANGE UP (ref 8.4–10.5)
CHLORIDE SERPL-SCNC: 94 MMOL/L — LOW (ref 96–108)
CHLORIDE SERPL-SCNC: 97 MMOL/L — SIGNIFICANT CHANGE UP (ref 96–108)
CO2 SERPL-SCNC: 25 MMOL/L — SIGNIFICANT CHANGE UP (ref 22–31)
CREAT SERPL-MCNC: 2.98 MG/DL — HIGH (ref 0.5–1.3)
CREAT SERPL-MCNC: 3.27 MG/DL — HIGH (ref 0.5–1.3)
CULTURE RESULTS: SIGNIFICANT CHANGE UP
EGFR: 21 ML/MIN/1.73M2 — LOW
EGFR: 23 ML/MIN/1.73M2 — LOW
GAS PNL BLDA: SIGNIFICANT CHANGE UP
GLUCOSE BLDC GLUCOMTR-MCNC: 119 MG/DL — HIGH (ref 70–99)
GLUCOSE BLDC GLUCOMTR-MCNC: 124 MG/DL — HIGH (ref 70–99)
GLUCOSE BLDC GLUCOMTR-MCNC: 133 MG/DL — HIGH (ref 70–99)
GLUCOSE BLDC GLUCOMTR-MCNC: 140 MG/DL — HIGH (ref 70–99)
GLUCOSE BLDC GLUCOMTR-MCNC: 145 MG/DL — HIGH (ref 70–99)
GLUCOSE BLDC GLUCOMTR-MCNC: 151 MG/DL — HIGH (ref 70–99)
GLUCOSE BLDC GLUCOMTR-MCNC: 158 MG/DL — HIGH (ref 70–99)
GLUCOSE BLDC GLUCOMTR-MCNC: 173 MG/DL — HIGH (ref 70–99)
GLUCOSE SERPL-MCNC: 128 MG/DL — HIGH (ref 70–99)
GLUCOSE SERPL-MCNC: 146 MG/DL — HIGH (ref 70–99)
GRAM STN FLD: SIGNIFICANT CHANGE UP
HCT VFR BLD CALC: 29.8 % — LOW (ref 39–50)
HEPARIN-PF4 AB RESULT: <0.6 U/ML — SIGNIFICANT CHANGE UP (ref 0–0.9)
HGB BLD-MCNC: 9.4 G/DL — LOW (ref 13–17)
LIDOCAIN IGE QN: 872 U/L — HIGH (ref 7–60)
MAGNESIUM SERPL-MCNC: 2.3 MG/DL — SIGNIFICANT CHANGE UP (ref 1.6–2.6)
MCHC RBC-ENTMCNC: 29.3 PG — SIGNIFICANT CHANGE UP (ref 27–34)
MCHC RBC-ENTMCNC: 31.5 GM/DL — LOW (ref 32–36)
MCV RBC AUTO: 92.8 FL — SIGNIFICANT CHANGE UP (ref 80–100)
METHOD TYPE: SIGNIFICANT CHANGE UP
NRBC # BLD: 0 /100 WBCS — SIGNIFICANT CHANGE UP (ref 0–0)
PF4 HEPARIN CMPLX AB SER-ACNC: NEGATIVE — SIGNIFICANT CHANGE UP
PHOSPHATE SERPL-MCNC: 4.3 MG/DL — SIGNIFICANT CHANGE UP (ref 2.5–4.5)
PLATELET # BLD AUTO: 79 K/UL — LOW (ref 150–400)
POTASSIUM SERPL-MCNC: 3.7 MMOL/L — SIGNIFICANT CHANGE UP (ref 3.5–5.3)
POTASSIUM SERPL-MCNC: 3.9 MMOL/L — SIGNIFICANT CHANGE UP (ref 3.5–5.3)
POTASSIUM SERPL-SCNC: 3.7 MMOL/L — SIGNIFICANT CHANGE UP (ref 3.5–5.3)
POTASSIUM SERPL-SCNC: 3.9 MMOL/L — SIGNIFICANT CHANGE UP (ref 3.5–5.3)
PROT SERPL-MCNC: 6.8 G/DL — SIGNIFICANT CHANGE UP (ref 6–8.3)
PROT SERPL-MCNC: 7 G/DL — SIGNIFICANT CHANGE UP (ref 6–8.3)
RBC # BLD: 3.21 M/UL — LOW (ref 4.2–5.8)
RBC # FLD: 17.7 % — HIGH (ref 10.3–14.5)
SODIUM SERPL-SCNC: 132 MMOL/L — LOW (ref 135–145)
SODIUM SERPL-SCNC: 136 MMOL/L — SIGNIFICANT CHANGE UP (ref 135–145)
SPECIMEN SOURCE: SIGNIFICANT CHANGE UP
WBC # BLD: 11.36 K/UL — HIGH (ref 3.8–10.5)
WBC # FLD AUTO: 11.36 K/UL — HIGH (ref 3.8–10.5)

## 2023-01-03 PROCEDURE — 99291 CRITICAL CARE FIRST HOUR: CPT

## 2023-01-03 PROCEDURE — 99292 CRITICAL CARE ADDL 30 MIN: CPT | Mod: 24

## 2023-01-03 PROCEDURE — 71045 X-RAY EXAM CHEST 1 VIEW: CPT | Mod: 26

## 2023-01-03 PROCEDURE — 99232 SBSQ HOSP IP/OBS MODERATE 35: CPT

## 2023-01-03 PROCEDURE — 99233 SBSQ HOSP IP/OBS HIGH 50: CPT | Mod: GC

## 2023-01-03 RX ORDER — LACTULOSE 10 G/15ML
20 SOLUTION ORAL ONCE
Refills: 0 | Status: COMPLETED | OUTPATIENT
Start: 2023-01-03 | End: 2023-01-03

## 2023-01-03 RX ORDER — MIDODRINE HYDROCHLORIDE 2.5 MG/1
20 TABLET ORAL EVERY 8 HOURS
Refills: 0 | Status: DISCONTINUED | OUTPATIENT
Start: 2023-01-03 | End: 2023-01-06

## 2023-01-03 RX ORDER — PHENYLEPHRINE HYDROCHLORIDE 10 MG/ML
0.14 INJECTION INTRAVENOUS
Qty: 40 | Refills: 0 | Status: DISCONTINUED | OUTPATIENT
Start: 2023-01-03 | End: 2023-01-05

## 2023-01-03 RX ORDER — ALBUMIN HUMAN 25 %
250 VIAL (ML) INTRAVENOUS ONCE
Refills: 0 | Status: COMPLETED | OUTPATIENT
Start: 2023-01-03 | End: 2023-01-03

## 2023-01-03 RX ORDER — CALCIUM GLUCONATE 100 MG/ML
2 VIAL (ML) INTRAVENOUS ONCE
Refills: 0 | Status: COMPLETED | OUTPATIENT
Start: 2023-01-03 | End: 2023-01-03

## 2023-01-03 RX ORDER — ALBUMIN HUMAN 25 %
50 VIAL (ML) INTRAVENOUS ONCE
Refills: 0 | Status: COMPLETED | OUTPATIENT
Start: 2023-01-03 | End: 2023-01-03

## 2023-01-03 RX ORDER — VANCOMYCIN HCL 1 G
1000 VIAL (EA) INTRAVENOUS ONCE
Refills: 0 | Status: COMPLETED | OUTPATIENT
Start: 2023-01-03 | End: 2023-01-03

## 2023-01-03 RX ORDER — POLYETHYLENE GLYCOL 3350 17 G/17G
17 POWDER, FOR SOLUTION ORAL DAILY
Refills: 0 | Status: DISCONTINUED | OUTPATIENT
Start: 2023-01-03 | End: 2023-01-20

## 2023-01-03 RX ADMIN — PHENYLEPHRINE HYDROCHLORIDE 5 MICROGRAM(S)/KG/MIN: 10 INJECTION INTRAVENOUS at 08:31

## 2023-01-03 RX ADMIN — CHLORHEXIDINE GLUCONATE 15 MILLILITER(S): 213 SOLUTION TOPICAL at 06:01

## 2023-01-03 RX ADMIN — CHLORHEXIDINE GLUCONATE 1 APPLICATION(S): 213 SOLUTION TOPICAL at 06:01

## 2023-01-03 RX ADMIN — Medication 3: at 17:47

## 2023-01-03 RX ADMIN — CASPOFUNGIN ACETATE 260 MILLIGRAM(S): 7 INJECTION, POWDER, LYOPHILIZED, FOR SOLUTION INTRAVENOUS at 17:46

## 2023-01-03 RX ADMIN — SIMETHICONE 80 MILLIGRAM(S): 80 TABLET, CHEWABLE ORAL at 00:27

## 2023-01-03 RX ADMIN — Medication 3: at 01:44

## 2023-01-03 RX ADMIN — HUMAN INSULIN 7 UNIT(S): 100 INJECTION, SUSPENSION SUBCUTANEOUS at 12:04

## 2023-01-03 RX ADMIN — AMIODARONE HYDROCHLORIDE 200 MILLIGRAM(S): 400 TABLET ORAL at 05:43

## 2023-01-03 RX ADMIN — PHENYLEPHRINE HYDROCHLORIDE 5 MICROGRAM(S)/KG/MIN: 10 INJECTION INTRAVENOUS at 02:21

## 2023-01-03 RX ADMIN — Medication 81 MILLIGRAM(S): at 11:06

## 2023-01-03 RX ADMIN — HUMAN INSULIN 7 UNIT(S): 100 INJECTION, SUSPENSION SUBCUTANEOUS at 17:47

## 2023-01-03 RX ADMIN — ATORVASTATIN CALCIUM 80 MILLIGRAM(S): 80 TABLET, FILM COATED ORAL at 21:03

## 2023-01-03 RX ADMIN — MIDODRINE HYDROCHLORIDE 20 MILLIGRAM(S): 2.5 TABLET ORAL at 21:03

## 2023-01-03 RX ADMIN — Medication 250 MILLIGRAM(S): at 11:06

## 2023-01-03 RX ADMIN — Medication 50 MILLILITER(S): at 01:35

## 2023-01-03 RX ADMIN — SEVELAMER CARBONATE 800 MILLIGRAM(S): 2400 POWDER, FOR SUSPENSION ORAL at 17:28

## 2023-01-03 RX ADMIN — MIDODRINE HYDROCHLORIDE 20 MILLIGRAM(S): 2.5 TABLET ORAL at 13:00

## 2023-01-03 RX ADMIN — Medication 200 GRAM(S): at 00:45

## 2023-01-03 RX ADMIN — HUMAN INSULIN 7 UNIT(S): 100 INJECTION, SUSPENSION SUBCUTANEOUS at 00:21

## 2023-01-03 RX ADMIN — Medication 125 MILLILITER(S): at 05:30

## 2023-01-03 RX ADMIN — PANTOPRAZOLE SODIUM 40 MILLIGRAM(S): 20 TABLET, DELAYED RELEASE ORAL at 11:07

## 2023-01-03 RX ADMIN — HEPARIN SODIUM 5000 UNIT(S): 5000 INJECTION INTRAVENOUS; SUBCUTANEOUS at 00:20

## 2023-01-03 RX ADMIN — HEPARIN SODIUM 5000 UNIT(S): 5000 INJECTION INTRAVENOUS; SUBCUTANEOUS at 17:23

## 2023-01-03 RX ADMIN — CHLORHEXIDINE GLUCONATE 15 MILLILITER(S): 213 SOLUTION TOPICAL at 17:22

## 2023-01-03 RX ADMIN — Medication 15 MILLILITER(S): at 11:06

## 2023-01-03 RX ADMIN — HUMAN INSULIN 7 UNIT(S): 100 INJECTION, SUSPENSION SUBCUTANEOUS at 05:43

## 2023-01-03 RX ADMIN — PHENYLEPHRINE HYDROCHLORIDE 5 MICROGRAM(S)/KG/MIN: 10 INJECTION INTRAVENOUS at 04:54

## 2023-01-03 RX ADMIN — Medication 100 MILLIGRAM(S): at 11:07

## 2023-01-03 RX ADMIN — CHLORHEXIDINE GLUCONATE 1 APPLICATION(S): 213 SOLUTION TOPICAL at 21:50

## 2023-01-03 RX ADMIN — Medication 50 MILLILITER(S): at 02:39

## 2023-01-03 RX ADMIN — MIDODRINE HYDROCHLORIDE 20 MILLIGRAM(S): 2.5 TABLET ORAL at 04:54

## 2023-01-03 RX ADMIN — LACTULOSE 20 GRAM(S): 10 SOLUTION ORAL at 10:25

## 2023-01-03 RX ADMIN — POLYETHYLENE GLYCOL 3350 17 GRAM(S): 17 POWDER, FOR SOLUTION ORAL at 13:57

## 2023-01-03 RX ADMIN — HEPARIN SODIUM 5000 UNIT(S): 5000 INJECTION INTRAVENOUS; SUBCUTANEOUS at 09:37

## 2023-01-03 RX ADMIN — SEVELAMER CARBONATE 800 MILLIGRAM(S): 2400 POWDER, FOR SUSPENSION ORAL at 05:43

## 2023-01-03 RX ADMIN — Medication 125 MILLILITER(S): at 03:30

## 2023-01-03 NOTE — PROGRESS NOTE ADULT - CRITICAL CARE SERVICES PROVIDED
Patient is critically ill, requiring critical care services.

## 2023-01-03 NOTE — PROGRESS NOTE ADULT - SUBJECTIVE AND OBJECTIVE BOX
Patient seen and examined at the bedside.    Remained critically ill on continuous ICU monitoring.      Brief Summary:  61 y/o male with PMH of CABG, DM, HTN, HLD presenting with a STEMI.   He was in cardiogenic shock requiring VA ECMO, respiratory failure requiring tracheostomy, and renal failure requiring RRT.    24 Hour events:      OBJECTIVE:  Vital Signs Last 24 Hrs  T(C): 37.8 (03 Jan 2023 04:00), Max: 37.8 (03 Jan 2023 04:00)  T(F): 100 (03 Jan 2023 04:00), Max: 100 (03 Jan 2023 04:00)  HR: 85 (03 Jan 2023 07:00) (81 - 103)  BP: 101/58 (03 Jan 2023 04:00) (98/56 - 101/58)  BP(mean): 76 (03 Jan 2023 04:00) (73 - 76)  RR: 14 (03 Jan 2023 07:00) (13 - 25)  SpO2: 100% (03 Jan 2023 07:00) (99% - 100%)    Parameters below as of 03 Jan 2023 04:00  Patient On (Oxygen Delivery Method): ventilator    O2 Concentration (%): 50    Mode: SIMV (Synchronized Intermittent Mandatory Ventilation)  RR (machine): 14  FiO2: 50  PEEP: 5  PS: 10  ITime: 1  MAP: 15  PC: 15  PIP: 20            Physical Exam:   General: Sitting in chair, no acute distress   Neurology: Following commands, alert and interactive  Eyes: Bilateral pupils equal and reactive   ENT/Neck: Trach collar  Respiratory: Breath sounds bilaterally  CV: Sinus Rhythm  Abdominal: Soft, nontender  Extremities: Warm        -------------------------------------------------------------------------------------------------------------------------------                        9.4    11.36 )-----------( 79       ( 03 Jan 2023 00:07 )             29.8     01-03    136  |  97  |  54<H>  ----------------------------<  146<H>  3.7   |  25  |  2.98<H>    Ca    8.3<L>      03 Jan 2023 00:07  Phos  4.3     01-03  Mg     2.3     01-03    TPro  6.8  /  Alb  3.0<L>  /  TBili  0.8  /  DBili  x   /  AST  29  /  ALT  19  /  AlkPhos  77  01-03    LIVER FUNCTIONS - ( 03 Jan 2023 00:07 )  Alb: 3.0 g/dL / Pro: 6.8 g/dL / ALK PHOS: 77 U/L / ALT: 19 U/L / AST: 29 U/L / GGT: x             ABG - ( 02 Jan 2023 23:59 )  pH, Arterial: 7.50  pH, Blood: x     /  pCO2: 38    /  pO2: 130   / HCO3: 30    / Base Excess: 6.0   /  SaO2: 99.4    ------------------------------------------------------------------------------------------------------------------------------  Assessment:  61 y/o M admitted with a STEMI and cardiogenic shock.     Cardiogenic shock s/p VA ECMO decannulated 12/8  Mitral regurgitation s/p Mitral clip x1 12/16/22  Acute respiratory distress/failure s/p Tracheostomy 12/16/22   At high risk for hemodynamic instability  ERIC/Renal failure  Fungemia  Anemia  Thrombocytopenia     Plan:   ***Neuro***  PT ongoing.  Pain control with prns  Optimize day/night cycles.    ***Cardiovascular***  At high risk for hemodynamic instability and cardiac arrhythmias.  Midodrine for pressure support   c/w phenylephrine Infusion  PO Amiodarone for afib prophylaxis   ASA /Statin daily    ***Pulmonary***  Respiratory failure s/p tracheostomy  Long trach collar trials.  Deep breathing and coughing exercises.  Wean oxygen as able.    ***GI***  Protein calorie malnutrition - Tube feeds via nasal feeding tube  Protonix for stress ulcer prophylaxis   Bowel regimen with senna  Simethicone PRN gas    ***Renal***  Renal failure - HD yesterday.   Replete electrolytes as indicated.  Continue with Retacrit     ***ID***  Candidemia - On Caspofungin  12/26 Bcx +clinton 12/27 12/29 BCx NGTD   12/30 UCx NGTD 1/1 SCx NGTD     ***Endocrine***  DM with hyperglycemia - Insulin sliding scale, NPH.    ***Hematology***  Acute blood loss anemia and thrombocytopenia- no transfusion indication currently, will trend.  SQ Heparin for DVT prophylaxis        Patient requires continuous monitoring with bedside rhythm monitoring, pulse oximetry monitoring, and continuous central venous and arterial pressure monitoring; and intermittent blood gas analysis. Care plan discussed with the ICU care team.   Patient remained critical, at risk for life threatening decompensation.    I have spent 30 minutes providing critical care management to this patient.    By signing my name below, I, Rosio Alonzo, attest that this documentation has been prepared under the direction and in the presence of Jade Rosas MD  Electronically signed: Brian Burnett, 01-03-23 @ 07:25    I, Jade Rosas MD, personally performed the services described in this documentation. all medical record entries made by the scribe were at my direction and in my presence. I have reviewed the chart and agree that the record reflects my personal performance and is accurate and complete  Electronically signed: Jade Rosas MD Patient seen and examined at the bedside.    Remained critically ill on continuous ICU monitoring.      Brief Summary:  63 y/o male with PMH of CABG, DM, HTN, HLD presenting with a STEMI.   He was in cardiogenic shock requiring VA ECMO, respiratory failure requiring tracheostomy, and renal failure requiring RRT.    24 Hour events:      OBJECTIVE:  Vital Signs Last 24 Hrs  T(C): 37.8 (03 Jan 2023 04:00), Max: 37.8 (03 Jan 2023 04:00)  T(F): 100 (03 Jan 2023 04:00), Max: 100 (03 Jan 2023 04:00)  HR: 85 (03 Jan 2023 07:00) (81 - 103)  BP: 101/58 (03 Jan 2023 04:00) (98/56 - 101/58)  BP(mean): 76 (03 Jan 2023 04:00) (73 - 76)  RR: 14 (03 Jan 2023 07:00) (13 - 25)  SpO2: 100% (03 Jan 2023 07:00) (99% - 100%)    Parameters below as of 03 Jan 2023 04:00  Patient On (Oxygen Delivery Method): ventilator    O2 Concentration (%): 50    Mode: SIMV (Synchronized Intermittent Mandatory Ventilation)  RR (machine): 14  FiO2: 50  PEEP: 5  PS: 10  ITime: 1  MAP: 15  PC: 15  PIP: 20            Physical Exam:   General: Sitting in chair, no acute distress   Neurology: Following commands, alert and interactive  Eyes: Bilateral pupils equal and reactive   ENT/Neck: Trach collar  Respiratory: Breath sounds bilaterally  CV: Sinus Rhythm  Abdominal: Soft, nontender  Extremities: Warm        -------------------------------------------------------------------------------------------------------------------------------                        9.4    11.36 )-----------( 79       ( 03 Jan 2023 00:07 )             29.8     01-03    136  |  97  |  54<H>  ----------------------------<  146<H>  3.7   |  25  |  2.98<H>    Ca    8.3<L>      03 Jan 2023 00:07  Phos  4.3     01-03  Mg     2.3     01-03    TPro  6.8  /  Alb  3.0<L>  /  TBili  0.8  /  DBili  x   /  AST  29  /  ALT  19  /  AlkPhos  77  01-03    LIVER FUNCTIONS - ( 03 Jan 2023 00:07 )  Alb: 3.0 g/dL / Pro: 6.8 g/dL / ALK PHOS: 77 U/L / ALT: 19 U/L / AST: 29 U/L / GGT: x             ABG - ( 02 Jan 2023 23:59 )  pH, Arterial: 7.50  pH, Blood: x     /  pCO2: 38    /  pO2: 130   / HCO3: 30    / Base Excess: 6.0   /  SaO2: 99.4    ------------------------------------------------------------------------------------------------------------------------------  Assessment:  63 y/o M admitted with a STEMI and cardiogenic shock.     Cardiogenic shock s/p VA ECMO decannulated 12/8  Mitral regurgitation s/p Mitral clip x1 12/16/22  Acute respiratory distress/failure s/p Tracheostomy 12/16/22   At high risk for hemodynamic instability  ERIC/Renal failure  Fungemia  Anemia  Thrombocytopenia     Plan:   ***Neuro***  PT ongoing.  Pain control with prns  Optimize day/night cycles.    ***Cardiovascular***  At high risk for hemodynamic instability and cardiac arrhythmias.  Midodrine for pressure support   c/w phenylephrine Infusion  PO Amiodarone for afib prophylaxis   ASA /Statin daily    ***Pulmonary***  Respiratory failure s/p tracheostomy  Long trach collar trials.  Deep breathing and coughing exercises.  Wean oxygen as able.    ***GI***  Protein calorie malnutrition - Tube feeds via nasal feeding tube  Protonix for stress ulcer prophylaxis   Bowel regimen with senna  Simethicone PRN gas    ***Renal***  Renal failure - HD yesterday.   Replete electrolytes as indicated.  Continue with Retacrit     ***ID***  Candidemia - On Caspofungin  12/26 Bcx +[place] 12/27 12/29 BCx NGTD   12/30 UCx NGTD 1/1 SCx NGTD     ***Endocrine***  DM with hyperglycemia - Insulin sliding scale, NPH.    ***Hematology***  Acute blood loss anemia and thrombocytopenia- no transfusion indication currently, will trend.  SQ Heparin for DVT prophylaxis        Patient requires continuous monitoring with bedside rhythm monitoring, pulse oximetry monitoring, and continuous central venous and arterial pressure monitoring; and intermittent blood gas analysis. Care plan discussed with the ICU care team.   Patient remained critical, at risk for life threatening decompensation.    I have spent 30 minutes providing critical care management to this patient.    By signing my name below, I, Rosio Alonzo, attest that this documentation has been prepared under the direction and in the presence of Jade Rosas MD  Electronically signed: Brian Burnett, 01-03-23 @ 07:25    I, Jade Rosas MD, personally performed the services described in this documentation. all medical record entries made by the scribe were at my direction and in my presence. I have reviewed the chart and agree that the record reflects my personal performance and is accurate and complete  Electronically signed: Jade Rosas MD Patient seen and examined at the bedside.    Remained critically ill on continuous ICU monitoring.      Brief Summary:  61 y/o male with PMH of CABG, DM, HTN, HLD presenting with a STEMI.   He was in cardiogenic shock requiring VA ECMO, respiratory failure requiring tracheostomy, and renal failure requiring RRT.    24 Hour events:  - Continue TC as tolerated   - Plan to start vancomycin IVPB     OBJECTIVE:  Vital Signs Last 24 Hrs  T(C): 37.8 (03 Jan 2023 04:00), Max: 37.8 (03 Jan 2023 04:00)  T(F): 100 (03 Jan 2023 04:00), Max: 100 (03 Jan 2023 04:00)  HR: 85 (03 Jan 2023 07:00) (81 - 103)  BP: 101/58 (03 Jan 2023 04:00) (98/56 - 101/58)  BP(mean): 76 (03 Jan 2023 04:00) (73 - 76)  RR: 14 (03 Jan 2023 07:00) (13 - 25)  SpO2: 100% (03 Jan 2023 07:00) (99% - 100%)    Parameters below as of 03 Jan 2023 04:00  Patient On (Oxygen Delivery Method): ventilator    O2 Concentration (%): 50    Mode: SIMV (Synchronized Intermittent Mandatory Ventilation)  RR (machine): 14  FiO2: 50  PEEP: 5  PS: 10  ITime: 1  MAP: 15  PC: 15  PIP: 20            Physical Exam:   General: Sitting in chair, no acute distress   Neurology: Following commands, alert and interactive  Eyes: Bilateral pupils equal and reactive   ENT/Neck: Trach collar  Respiratory: Breath sounds bilaterally  CV: Sinus Rhythm  Abdominal: Soft, nontender  Extremities: Warm        -------------------------------------------------------------------------------------------------------------------------------                        9.4    11.36 )-----------( 79       ( 03 Jan 2023 00:07 )             29.8     01-03    136  |  97  |  54<H>  ----------------------------<  146<H>  3.7   |  25  |  2.98<H>    Ca    8.3<L>      03 Jan 2023 00:07  Phos  4.3     01-03  Mg     2.3     01-03    TPro  6.8  /  Alb  3.0<L>  /  TBili  0.8  /  DBili  x   /  AST  29  /  ALT  19  /  AlkPhos  77  01-03    LIVER FUNCTIONS - ( 03 Jan 2023 00:07 )  Alb: 3.0 g/dL / Pro: 6.8 g/dL / ALK PHOS: 77 U/L / ALT: 19 U/L / AST: 29 U/L / GGT: x             ABG - ( 02 Jan 2023 23:59 )  pH, Arterial: 7.50  pH, Blood: x     /  pCO2: 38    /  pO2: 130   / HCO3: 30    / Base Excess: 6.0   /  SaO2: 99.4    ------------------------------------------------------------------------------------------------------------------------------  Assessment:  61 y/o M admitted with a STEMI and cardiogenic shock.     Cardiogenic shock s/p VA ECMO decannulated 12/8  Mitral regurgitation s/p Mitral clip x1 12/16/22  Acute respiratory distress/failure s/p Tracheostomy 12/16/22   At high risk for hemodynamic instability  ERIC/Renal failure  Fungemia  Anemia  Thrombocytopenia     Plan:   ***Neuro***  PT ongoing.  Pain control with prns  Optimize day/night cycles.    ***Cardiovascular***  At high risk for hemodynamic instability and cardiac arrhythmias.  Midodrine for pressure support   c/w phenylephrine Infusion  PO Amiodarone for afib prophylaxis   ASA /Statin daily    ***Pulmonary***  Respiratory failure s/p tracheostomy  Long trach collar trials.  Deep breathing and coughing exercises.  Wean oxygen as able.    ***GI***  Protein calorie malnutrition - Tube feeds via nasal feeding tube  Protonix for stress ulcer prophylaxis   Bowel regimen with senna  Simethicone PRN gas    ***Renal***  Renal failure - HD yesterday.   Replete electrolytes as indicated.  Continue with Retacrit     ***ID***  Candidemia - On Caspofungin  12/26 Bcx +[place] 12/27 12/29 BCx NGTD   12/30 UCx NGTD 1/1 SCx NGTD     ***Endocrine***  DM with hyperglycemia - Insulin sliding scale, NPH.    ***Hematology***  Acute blood loss anemia and thrombocytopenia- no transfusion indication currently, will trend.  SQ Heparin for DVT prophylaxis        Patient requires continuous monitoring with bedside rhythm monitoring, pulse oximetry monitoring, and continuous central venous and arterial pressure monitoring; and intermittent blood gas analysis. Care plan discussed with the ICU care team.   Patient remained critical, at risk for life threatening decompensation.    I have spent 30 minutes providing critical care management to this patient.    By signing my name below, I, Rosio Alonzo, attest that this documentation has been prepared under the direction and in the presence of Jade Rosas MD  Electronically signed: Brian Burnett, 01-03-23 @ 07:25    I, Jade Rosas MD, personally performed the services described in this documentation. all medical record entries made by the karlaibkyle were at my direction and in my presence. I have reviewed the chart and agree that the record reflects my personal performance and is accurate and complete  Electronically signed: Jade Rosas MD Patient seen and examined at the bedside.    Remained critically ill on continuous ICU monitoring.      Brief Summary:  63 y/o male with PMH of CABG, DM, HTN, HLD presenting with a STEMI.   He was in cardiogenic shock requiring VA ECMO, respiratory failure requiring tracheostomy, and renal failure requiring RRT.    24 Hour events:  - Continue TC as tolerated   - Plan to start vancomycin IVPB     OBJECTIVE:  Vital Signs Last 24 Hrs  T(C): 37.8 (03 Jan 2023 04:00), Max: 37.8 (03 Jan 2023 04:00)  T(F): 100 (03 Jan 2023 04:00), Max: 100 (03 Jan 2023 04:00)  HR: 85 (03 Jan 2023 07:00) (81 - 103)  BP: 101/58 (03 Jan 2023 04:00) (98/56 - 101/58)  BP(mean): 76 (03 Jan 2023 04:00) (73 - 76)  RR: 14 (03 Jan 2023 07:00) (13 - 25)  SpO2: 100% (03 Jan 2023 07:00) (99% - 100%)    Parameters below as of 03 Jan 2023 04:00  Patient On (Oxygen Delivery Method): ventilator    O2 Concentration (%): 50    Mode: SIMV (Synchronized Intermittent Mandatory Ventilation)  RR (machine): 14  FiO2: 50  PEEP: 5  PS: 10  ITime: 1  MAP: 15  PC: 15  PIP: 20            Physical Exam:   General: Sitting in chair, no acute distress   Neurology: Following commands, alert and interactive  Eyes: Bilateral pupils equal and reactive   ENT/Neck: Trach collar  Respiratory: Breath sounds bilaterally  CV: Sinus Rhythm  Abdominal: Soft, nontender  Extremities: Warm        -------------------------------------------------------------------------------------------------------------------------------                        9.4    11.36 )-----------( 79       ( 03 Jan 2023 00:07 )             29.8     01-03    136  |  97  |  54<H>  ----------------------------<  146<H>  3.7   |  25  |  2.98<H>    Ca    8.3<L>      03 Jan 2023 00:07  Phos  4.3     01-03  Mg     2.3     01-03    TPro  6.8  /  Alb  3.0<L>  /  TBili  0.8  /  DBili  x   /  AST  29  /  ALT  19  /  AlkPhos  77  01-03    LIVER FUNCTIONS - ( 03 Jan 2023 00:07 )  Alb: 3.0 g/dL / Pro: 6.8 g/dL / ALK PHOS: 77 U/L / ALT: 19 U/L / AST: 29 U/L / GGT: x             ABG - ( 02 Jan 2023 23:59 )  pH, Arterial: 7.50  pH, Blood: x     /  pCO2: 38    /  pO2: 130   / HCO3: 30    / Base Excess: 6.0   /  SaO2: 99.4    ------------------------------------------------------------------------------------------------------------------------------  Assessment:  63 y/o M admitted with a STEMI and cardiogenic shock.     Cardiogenic shock s/p VA ECMO decannulated 12/8  Mitral regurgitation s/p Mitral clip x1 12/16/22  Acute respiratory distress/failure s/p Tracheostomy 12/16/22   At high risk for hemodynamic instability  ERIC/Renal failure  Fungemia  Anemia  Thrombocytopenia     Plan:   ***Neuro***  PT ongoing.  Pain control with prns  Optimize day/night cycles.    ***Cardiovascular***  At high risk for hemodynamic instability and cardiac arrhythmias.  Midodrine for pressure support   c/w phenylephrine Infusion  PO Amiodarone for afib prophylaxis   ASA /Statin daily    ***Pulmonary***  Respiratory failure s/p tracheostomy  Long trach collar trials.  Deep breathing and coughing exercises.  Wean oxygen as able.    ***GI***  Protein calorie malnutrition - Tube feeds via nasal feeding tube  Protonix for stress ulcer prophylaxis   Bowel regimen with senna  Simethicone PRN gas    ***Renal***  Renal failure - HD yesterday.   Replete electrolytes as indicated.  Continue with Retacrit     ***ID***  Candidemia - On Caspofungin  12/26 Bcx +[place] 12/27 12/29 BCx NGTD   12/30 UCx NGTD 1/1 SCx NGTD     ***Endocrine***  DM with hyperglycemia - Insulin sliding scale, NPH.    ***Hematology***  Acute blood loss anemia and thrombocytopenia- no transfusion indication currently, will trend.  SQ Heparin for DVT prophylaxis        Patient requires continuous monitoring with bedside rhythm monitoring, pulse oximetry monitoring, and continuous central venous and arterial pressure monitoring; and intermittent blood gas analysis. Care plan discussed with the ICU care team.   Patient remained critical, at risk for life threatening decompensation.    I have spent 30 minutes providing critical care management to this patient.    By signing my name below, I, Rosio Alonoz, attest that this documentation has been prepared under the direction and in the presence of Jade Rosas MD  Electronically signed: Brian Burnett, 01-03-23 @ 07:25    I, Jade Rosas MD, personally performed the services described in this documentation. all medical record entries made by the karlaibkyle were at my direction and in my presence. I have reviewed the chart and agree that the record reflects my personal performance and is accurate and complete  Electronically signed: Jade Rosas MD Patient seen and examined at the bedside.    Remains critically ill on continuous ICU monitoring.      Brief Summary:  63 y/o male with PMH of CABG, DM, HTN, HLD presenting with a STEMI.   He was in cardiogenic shock requiring VA ECMO, respiratory failure requiring tracheostomy, and renal failure requiring RRT.    24 Hour events:  Blood culture positive for gpcs - dose of Vancomycin given.  Trach collar x 19 hours  Tolerated HD yesterday with Midodrine and Phenylephrine.      Objective:  Vital Signs Last 24 Hrs  T(C): 37.8 (03 Jan 2023 04:00), Max: 37.8 (03 Jan 2023 04:00)  T(F): 100 (03 Jan 2023 04:00), Max: 100 (03 Jan 2023 04:00)  HR: 85 (03 Jan 2023 07:00) (81 - 103)  BP: 101/58 (03 Jan 2023 04:00) (98/56 - 101/58)  BP(mean): 76 (03 Jan 2023 04:00) (73 - 76)  RR: 14 (03 Jan 2023 07:00) (13 - 25)  SpO2: 100% (03 Jan 2023 07:00) (99% - 100%)    Parameters below as of 03 Jan 2023 04:00  Patient On (Oxygen Delivery Method): ventilator    O2 Concentration (%): 50    Mode: SIMV (Synchronized Intermittent Mandatory Ventilation)  RR (machine): 14  FiO2: 50  PEEP: 5  PS: 10  ITime: 1  MAP: 15  PC: 15  PIP: 20              Physical Exam:   General: Sitting in chair, no acute distress   Neurology: Following commands, alert and interactive  Eyes: Bilateral pupils reactive   ENT/Neck: Trach collar  Respiratory: Breath sounds bilaterally  CV: Sinus Rhythm  Abdominal: Soft, nontender  Extremities: Warm          -------------------------------------------------------------------------------------------------------------------------------           Labs:                 9.4    11.36 )-----------( 79       ( 03 Jan 2023 00:07 )             29.8     01-03    136  |  97  |  54<H>  ----------------------------<  146<H>  3.7   |  25  |  2.98<H>    Ca    8.3<L>      03 Jan 2023 00:07  Phos  4.3     01-03  Mg     2.3     01-03    TPro  6.8  /  Alb  3.0<L>  /  TBili  0.8  /  DBili  x   /  AST  29  /  ALT  19  /  AlkPhos  77  01-03    LIVER FUNCTIONS - ( 03 Jan 2023 00:07 )  Alb: 3.0 g/dL / Pro: 6.8 g/dL / ALK PHOS: 77 U/L / ALT: 19 U/L / AST: 29 U/L / GGT: x             ABG - ( 02 Jan 2023 23:59 )  pH, Arterial: 7.50  pH, Blood: x     /  pCO2: 38    /  pO2: 130   / HCO3: 30    / Base Excess: 6.0   /  SaO2: 99.4    ------------------------------------------------------------------------------------------------------------------------------  Assessment:  63 y/o M admitted with a STEMI and cardiogenic shock.     Cardiogenic shock s/p VA ECMO decannulated 12/8  Mitral regurgitation s/p Mitral clip x1 12/16/22  Acute respiratory distress/failure s/p Tracheostomy 12/16/22   At high risk for hemodynamic instability  ERIC/Renal failure  Fungemia  Anemia  Thrombocytopenia     Plan:   ***Neuro***  PT ongoing.  Pain control with prns  Optimize day/night cycles.    ***Cardiovascular***  At high risk for hemodynamic instability and cardiac arrhythmias.  Midodrine for pressure support, Phenylephrine only if needed.  PO Amiodarone for afib prophylaxis   ASA /Statin daily    ***Pulmonary***  Respiratory failure s/p tracheostomy  Long trach collar trials - gradually extending.  Deep breathing and coughing exercises.  Wean oxygen as able.    ***GI***  Protein calorie malnutrition - Tube feeds via nasal feeding tube  Protonix for stress ulcer prophylaxis   Bowel regimen with senna    ***Renal***  Renal failure - HD yesterday, plan for UF only today.   Replete electrolytes as indicated.  Continue with Retacrit     ***ID***  Candidemia - On Caspofungin  Most recent cultures now with gpc - Vancomycin given, will follow up final results.    ***Endocrine***  DM with hyperglycemia - Insulin sliding scale, NPH.    ***Hematology***  Acute blood loss anemia and thrombocytopenia- no transfusion indication currently, will trend.  SQ Heparin for DVT prophylaxis          Patient requires continuous monitoring with bedside rhythm monitoring, pulse oximetry monitoring, and continuous central venous and arterial pressure monitoring; and intermittent blood gas analysis. Care plan discussed with the ICU care team.   Patient remained critical, at risk for life threatening decompensation.    I have spent 35 minutes providing critical care management to this patient.    By signing my name below, I, Rosio Alonzo, attest that this documentation has been prepared under the direction and in the presence of Jade Rosas MD  Electronically signed: Brian Burnett, 01-03-23 @ 07:25    I, Jade Rosas MD, personally performed the services described in this documentation. all medical record entries made by the scribe were at my direction and in my presence. I have reviewed the chart and agree that the record reflects my personal performance and is accurate and complete  Electronically signed: Jade Rosas MD Patient seen and examined at the bedside.    Remains critically ill on continuous ICU monitoring.      Brief Summary:  61 y/o male with PMH of CABG, DM, HTN, HLD presenting with a STEMI.   He was in cardiogenic shock requiring VA ECMO, respiratory failure requiring tracheostomy, and renal failure requiring RRT.    24 Hour events:  Blood culture positive for gpcs - dose of Vancomycin given.  Trach collar x 19 hours  Tolerated HD yesterday with Midodrine and Phenylephrine.      Objective:  Vital Signs Last 24 Hrs  T(C): 37.8 (03 Jan 2023 04:00), Max: 37.8 (03 Jan 2023 04:00)  T(F): 100 (03 Jan 2023 04:00), Max: 100 (03 Jan 2023 04:00)  HR: 85 (03 Jan 2023 07:00) (81 - 103)  BP: 101/58 (03 Jan 2023 04:00) (98/56 - 101/58)  BP(mean): 76 (03 Jan 2023 04:00) (73 - 76)  RR: 14 (03 Jan 2023 07:00) (13 - 25)  SpO2: 100% (03 Jan 2023 07:00) (99% - 100%)    Parameters below as of 03 Jan 2023 04:00  Patient On (Oxygen Delivery Method): ventilator    O2 Concentration (%): 50    Mode: SIMV (Synchronized Intermittent Mandatory Ventilation)  RR (machine): 14  FiO2: 50  PEEP: 5  PS: 10  ITime: 1  MAP: 15  PC: 15  PIP: 20              Physical Exam:   General: Sitting in chair, no acute distress   Neurology: Following commands, alert and interactive  Eyes: Bilateral pupils reactive   ENT/Neck: Trach collar  Respiratory: Breath sounds bilaterally  CV: Sinus Rhythm  Abdominal: Soft, nontender  Extremities: Warm          -------------------------------------------------------------------------------------------------------------------------------           Labs:                 9.4    11.36 )-----------( 79       ( 03 Jan 2023 00:07 )             29.8     01-03    136  |  97  |  54<H>  ----------------------------<  146<H>  3.7   |  25  |  2.98<H>    Ca    8.3<L>      03 Jan 2023 00:07  Phos  4.3     01-03  Mg     2.3     01-03    TPro  6.8  /  Alb  3.0<L>  /  TBili  0.8  /  DBili  x   /  AST  29  /  ALT  19  /  AlkPhos  77  01-03    LIVER FUNCTIONS - ( 03 Jan 2023 00:07 )  Alb: 3.0 g/dL / Pro: 6.8 g/dL / ALK PHOS: 77 U/L / ALT: 19 U/L / AST: 29 U/L / GGT: x             ABG - ( 02 Jan 2023 23:59 )  pH, Arterial: 7.50  pH, Blood: x     /  pCO2: 38    /  pO2: 130   / HCO3: 30    / Base Excess: 6.0   /  SaO2: 99.4    ------------------------------------------------------------------------------------------------------------------------------  Assessment:  61 y/o M admitted with a STEMI and cardiogenic shock.     Cardiogenic shock s/p VA ECMO decannulated 12/8  Mitral regurgitation s/p Mitral clip x1 12/16/22  Acute respiratory distress/failure s/p Tracheostomy 12/16/22   At high risk for hemodynamic instability  ERIC/Renal failure  Fungemia  Anemia  Thrombocytopenia     Plan:   ***Neuro***  PT ongoing.  Pain control with prns  Optimize day/night cycles.    ***Cardiovascular***  At high risk for hemodynamic instability and cardiac arrhythmias.  Midodrine for pressure support, Phenylephrine only if needed.  PO Amiodarone for afib prophylaxis   ASA /Statin daily    ***Pulmonary***  Respiratory failure s/p tracheostomy  Long trach collar trials - gradually extending.  Deep breathing and coughing exercises.  Wean oxygen as able.    ***GI***  Protein calorie malnutrition - Tube feeds via nasal feeding tube  Protonix for stress ulcer prophylaxis   Bowel regimen with senna    ***Renal***  Renal failure - HD yesterday, plan for UF only today.   Replete electrolytes as indicated.  Continue with Retacrit     ***ID***  Candidemia - On Caspofungin  Most recent cultures now with gpc - Vancomycin given, will follow up final results.    ***Endocrine***  DM with hyperglycemia - Insulin sliding scale, NPH.    ***Hematology***  Acute blood loss anemia and thrombocytopenia- no transfusion indication currently, will trend.  SQ Heparin for DVT prophylaxis          Patient requires continuous monitoring with bedside rhythm monitoring, pulse oximetry monitoring, and continuous central venous and arterial pressure monitoring; and intermittent blood gas analysis. Care plan discussed with the ICU care team.   Patient remained critical, at risk for life threatening decompensation.    I have spent 35 minutes providing critical care management to this patient.    By signing my name below, I, Rosio Alonzo, attest that this documentation has been prepared under the direction and in the presence of Jade Rosas MD  Electronically signed: Brian Burnett, 01-03-23 @ 07:25    I, Jade Rosas MD, personally performed the services described in this documentation. all medical record entries made by the scribe were at my direction and in my presence. I have reviewed the chart and agree that the record reflects my personal performance and is accurate and complete  Electronically signed: Jade Rosas MD

## 2023-01-03 NOTE — PROGRESS NOTE ADULT - PROBLEM SELECTOR PLAN 1
Pt with non oliguric ERIC/ ATN in the setting of STEMI, cardiac arrest & cardiogenic shock  SCr on arrival was 2.15; trended down to hector 1.6 on 11/25; slowly trended up & increased to 3.95 11/28. Pt initiated on CRRT/CVVHDF to optimize volume and electrolytes on 12/5/22. Pt likely with ATN. Pt underwent decannulation of ECMO on 12/8/22 and was taken off CRRT.     Pt reinitiated on CRRT overnight on 12/9/22 for volume overload. s/p trach on 12/16. CRRT stopped again 12/19. Bladder scan daily. Now with [facility]. Off Lasix gtt.     Getting daily HD/UF. Remains on TC with FiO2 40%. Last HD yesterday with 2L UF. Required pressor while on HD & yet remains on it. s/p albumin 500cc overnight. CXR with b/l pulm vasc congestion. Will do 2L UF only session today if tolerated. Consider using Midodrine before/during HD so that Pt. can tolerate session.  Will need tunneled catheter placed for ongoing dialysis needs once cleared from infectious standpoint.   Continue to monitor for renal recovery.    Monitor labs and urine output. Avoid any potential nephrotoxins. Dose medications as per HD.

## 2023-01-03 NOTE — PROGRESS NOTE ADULT - SUBJECTIVE AND OBJECTIVE BOX
infectious diseases progress note:    Patient is a 62y old  Male who presents with a chief complaint of Acute cholecystitis, gallstone pancreatitis (03 Jan 2023 07:53)        Acute pancreatitis without infection or necrosis         NEUROLOGIC:  No headache, confusion, dizziness, lightheadedness    Allergies    No Known Allergies    Intolerances        ANTIBIOTICS/RELEVANT:  antimicrobials  caspofungin IVPB 50 milliGRAM(s) IV Intermittent every 24 hours  caspofungin IVPB        immunologic:  epoetin teena-epbx (RETACRIT) Injectable 5000 Unit(s) IV Push <User Schedule>    OTHER:  acetaminophen    Suspension .. 650 milliGRAM(s) Oral every 6 hours PRN  aMIOdarone    Tablet 200 milliGRAM(s) Oral daily  aspirin  chewable 81 milliGRAM(s) Oral daily  atorvastatin 80 milliGRAM(s) Oral at bedtime  chlorhexidine 0.12% Liquid 15 milliLiter(s) Oral Mucosa every 12 hours  chlorhexidine 2% Cloths 1 Application(s) Topical <User Schedule>  dextrose 5%. 1000 milliLiter(s) IV Continuous <Continuous>  dextrose 50% Injectable 50 milliLiter(s) IV Push every 15 minutes  dextrose Oral Gel 15 Gram(s) Oral once PRN  glucagon  Injectable 1 milliGRAM(s) IntraMuscular once  heparin   Injectable 5000 Unit(s) SubCutaneous every 8 hours  insulin lispro (ADMELOG) corrective regimen sliding scale   SubCutaneous every 4 hours  insulin NPH human recombinant 7 Unit(s) SubCutaneous every 6 hours  midodrine 20 milliGRAM(s) Oral every 8 hours  multivitamin/minerals/iron Oral Solution (CENTRUM) 15 milliLiter(s) Oral daily  pantoprazole  Injectable 40 milliGRAM(s) IV Push daily  phenylephrine    Infusion 0.144 MICROgram(s)/kG/Min IV Continuous <Continuous>  senna 2 Tablet(s) Oral at bedtime  sevelamer carbonate Powder 800 milliGRAM(s) Enteral Tube two times a day  simethicone 80 milliGRAM(s) Chew daily PRN  sodium chloride 0.9% lock flush 10 milliLiter(s) IV Push every 1 hour PRN  thiamine 100 milliGRAM(s) Enteral Tube daily      Objective:  Vital Signs Last 24 Hrs  T(C): 37.2 (03 Jan 2023 08:00), Max: 37.8 (03 Jan 2023 04:00)  T(F): 99 (03 Jan 2023 08:00), Max: 100 (03 Jan 2023 04:00)  HR: 85 (03 Jan 2023 08:00) (81 - 103)  BP: 101/58 (03 Jan 2023 04:00) (98/56 - 101/58)  BP(mean): 76 (03 Jan 2023 04:00) (73 - 76)  RR: 26 (03 Jan 2023 08:00) (13 - 26)  SpO2: 100% (03 Jan 2023 08:00) (99% - 100%)    Parameters below as of 03 Jan 2023 08:00  Patient On (Oxygen Delivery Method): tracheostomy collar  O2 Flow (L/min): 40         Eyes:INOCENCIO, EOMI  Ear/Nose/Throat: no oral lesion, no sinus tenderness on percussion	  Neck:no JVD, no lymphadenopathy, supple  Respiratory: CTA lore  Cardiovascular: S1S2 RRR, no murmurs  Gastrointestinal:soft, (+) BS, no HSM  Extremities:no e/e/c        LABS:                        9.4    11.36 )-----------( 79       ( 03 Jan 2023 00:07 )             29.8     01-03    136  |  97  |  54<H>  ----------------------------<  146<H>  3.7   |  25  |  2.98<H>    Ca    8.3<L>      03 Jan 2023 00:07  Phos  4.3     01-03  Mg     2.3     01-03    TPro  6.8  /  Alb  3.0<L>  /  TBili  0.8  /  DBili  x   /  AST  29  /  ALT  19  /  AlkPhos  77  01-03            MICROBIOLOGY:    RECENT CULTURES:  01-02 @ 01:01 .Blood Blood-Peripheral                No growth to date.    01-01 @ 17:45 .Sputum Sputum       Few polymorphonuclear leukocytes per low power field  Few Squamous epithelial cells per low power field  Rare Gram positive cocci in pairs per oil power field           Normal Respiratory Christy present    12-30 @ 14:53 Clean Catch Clean Catch (Midstream)                <10,000 CFU/mL Normal Urogenital Christy    12-29 @ 00:56 .Blood Blood-Peripheral                No Growth Final    12-27 @ 19:30 .Blood Blood-Peripheral                No Growth Final    12-27 @ 16:44 .Blood Blood-Peripheral                No Growth Final          RESPIRATORY CULTURES:              RADIOLOGY & ADDITIONAL STUDIES:        Pager [phone]  After 5 pm/weekends or if no response :[phone] infectious diseases progress note:    Patient is a 62y old  Male who presents with a chief complaint of Acute cholecystitis, gallstone pancreatitis (03 Jan 2023 07:53)        Acute pancreatitis without infection or necrosis         NEUROLOGIC:  No headache, confusion, dizziness, lightheadedness    Allergies    No Known Allergies    Intolerances        ANTIBIOTICS/RELEVANT:  antimicrobials  caspofungin IVPB 50 milliGRAM(s) IV Intermittent every 24 hours  caspofungin IVPB        immunologic:  epoetin teena-epbx (RETACRIT) Injectable 5000 Unit(s) IV Push <User Schedule>    OTHER:  acetaminophen    Suspension .. 650 milliGRAM(s) Oral every 6 hours PRN  aMIOdarone    Tablet 200 milliGRAM(s) Oral daily  aspirin  chewable 81 milliGRAM(s) Oral daily  atorvastatin 80 milliGRAM(s) Oral at bedtime  chlorhexidine 0.12% Liquid 15 milliLiter(s) Oral Mucosa every 12 hours  chlorhexidine 2% Cloths 1 Application(s) Topical <User Schedule>  dextrose 5%. 1000 milliLiter(s) IV Continuous <Continuous>  dextrose 50% Injectable 50 milliLiter(s) IV Push every 15 minutes  dextrose Oral Gel 15 Gram(s) Oral once PRN  glucagon  Injectable 1 milliGRAM(s) IntraMuscular once  heparin   Injectable 5000 Unit(s) SubCutaneous every 8 hours  insulin lispro (ADMELOG) corrective regimen sliding scale   SubCutaneous every 4 hours  insulin NPH human recombinant 7 Unit(s) SubCutaneous every 6 hours  midodrine 20 milliGRAM(s) Oral every 8 hours  multivitamin/minerals/iron Oral Solution (CENTRUM) 15 milliLiter(s) Oral daily  pantoprazole  Injectable 40 milliGRAM(s) IV Push daily  phenylephrine    Infusion 0.144 MICROgram(s)/kG/Min IV Continuous <Continuous>  senna 2 Tablet(s) Oral at bedtime  sevelamer carbonate Powder 800 milliGRAM(s) Enteral Tube two times a day  simethicone 80 milliGRAM(s) Chew daily PRN  sodium chloride 0.9% lock flush 10 milliLiter(s) IV Push every 1 hour PRN  thiamine 100 milliGRAM(s) Enteral Tube daily      Objective:  Vital Signs Last 24 Hrs  T(C): 37.2 (03 Jan 2023 08:00), Max: 37.8 (03 Jan 2023 04:00)  T(F): 99 (03 Jan 2023 08:00), Max: 100 (03 Jan 2023 04:00)  HR: 85 (03 Jan 2023 08:00) (81 - 103)  BP: 101/58 (03 Jan 2023 04:00) (98/56 - 101/58)  BP(mean): 76 (03 Jan 2023 04:00) (73 - 76)  RR: 26 (03 Jan 2023 08:00) (13 - 26)  SpO2: 100% (03 Jan 2023 08:00) (99% - 100%)    Parameters below as of 03 Jan 2023 08:00  Patient On (Oxygen Delivery Method): tracheostomy collar  O2 Flow (L/min): 40         Eyes:INOCENCIO, EOMI  Ear/Nose/Throat: no oral lesion, no sinus tenderness on percussion	  Neck:no JVD, no lymphadenopathy, supple  Respiratory: CTA lore  Cardiovascular: S1S2 RRR, no murmurs  Gastrointestinal:soft, (+) BS, no HSM  Extremities:no e/e/c        LABS:                        9.4    11.36 )-----------( 79       ( 03 Jan 2023 00:07 )             29.8     01-03    136  |  97  |  54<H>  ----------------------------<  146<H>  3.7   |  25  |  2.98<H>    Ca    8.3<L>      03 Jan 2023 00:07  Phos  4.3     01-03  Mg     2.3     01-03    TPro  6.8  /  Alb  3.0<L>  /  TBili  0.8  /  DBili  x   /  AST  29  /  ALT  19  /  AlkPhos  77  01-03            MICROBIOLOGY:    RECENT CULTURES:  01-02 @ 01:01 .Blood Blood-Peripheral                No growth to date.    01-01 @ 17:45 .Sputum Sputum       Few polymorphonuclear leukocytes per low power field  Few Squamous epithelial cells per low power field  Rare Gram positive cocci in pairs per oil power field           Normal Respiratory Christy present    12-30 @ 14:53 Clean Catch Clean Catch (Midstream)                <10,000 CFU/mL Normal Urogenital Christy    12-29 @ 00:56 .Blood Blood-Peripheral                No Growth Final    12-27 @ 19:30 .Blood Blood-Peripheral                No Growth Final    12-27 @ 16:44 .Blood Blood-Peripheral                No Growth Final          RESPIRATORY CULTURES:              RADIOLOGY & ADDITIONAL STUDIES:        Pager [phone]  After 5 pm/weekends or if no response :[phone] infectious diseases progress note:    Patient is a 62y old  Male who presents with a chief complaint of Acute cholecystitis, gallstone pancreatitis (03 Jan 2023 07:53)        Acute pancreatitis without infection or necrosis         NEUROLOGIC:  No headache, confusion, dizziness, lightheadedness    Allergies    No Known Allergies    Intolerances        ANTIBIOTICS/RELEVANT:  antimicrobials  caspofungin IVPB 50 milliGRAM(s) IV Intermittent every 24 hours  caspofungin IVPB        immunologic:  epoetin teena-epbx (RETACRIT) Injectable 5000 Unit(s) IV Push <User Schedule>    OTHER:  acetaminophen    Suspension .. 650 milliGRAM(s) Oral every 6 hours PRN  aMIOdarone    Tablet 200 milliGRAM(s) Oral daily  aspirin  chewable 81 milliGRAM(s) Oral daily  atorvastatin 80 milliGRAM(s) Oral at bedtime  chlorhexidine 0.12% Liquid 15 milliLiter(s) Oral Mucosa every 12 hours  chlorhexidine 2% Cloths 1 Application(s) Topical <User Schedule>  dextrose 5%. 1000 milliLiter(s) IV Continuous <Continuous>  dextrose 50% Injectable 50 milliLiter(s) IV Push every 15 minutes  dextrose Oral Gel 15 Gram(s) Oral once PRN  glucagon  Injectable 1 milliGRAM(s) IntraMuscular once  heparin   Injectable 5000 Unit(s) SubCutaneous every 8 hours  insulin lispro (ADMELOG) corrective regimen sliding scale   SubCutaneous every 4 hours  insulin NPH human recombinant 7 Unit(s) SubCutaneous every 6 hours  midodrine 20 milliGRAM(s) Oral every 8 hours  multivitamin/minerals/iron Oral Solution (CENTRUM) 15 milliLiter(s) Oral daily  pantoprazole  Injectable 40 milliGRAM(s) IV Push daily  phenylephrine    Infusion 0.144 MICROgram(s)/kG/Min IV Continuous <Continuous>  senna 2 Tablet(s) Oral at bedtime  sevelamer carbonate Powder 800 milliGRAM(s) Enteral Tube two times a day  simethicone 80 milliGRAM(s) Chew daily PRN  sodium chloride 0.9% lock flush 10 milliLiter(s) IV Push every 1 hour PRN  thiamine 100 milliGRAM(s) Enteral Tube daily      Objective:  Vital Signs Last 24 Hrs  T(C): 37.2 (03 Jan 2023 08:00), Max: 37.8 (03 Jan 2023 04:00)  T(F): 99 (03 Jan 2023 08:00), Max: 100 (03 Jan 2023 04:00)  HR: 85 (03 Jan 2023 08:00) (81 - 103)  BP: 101/58 (03 Jan 2023 04:00) (98/56 - 101/58)  BP(mean): 76 (03 Jan 2023 04:00) (73 - 76)  RR: 26 (03 Jan 2023 08:00) (13 - 26)  SpO2: 100% (03 Jan 2023 08:00) (99% - 100%)    Parameters below as of 03 Jan 2023 08:00  Patient On (Oxygen Delivery Method): tracheostomy collar  O2 Flow (L/min): 40         Eyes:INOCENCIO, EOMI  Ear/Nose/Throat: no oral lesion, no sinus tenderness on percussion	  Neck:no JVD, no lymphadenopathy, supple  Respiratory: CTA lore  Cardiovascular: S1S2 RRR, no murmurs  Gastrointestinal:soft, (+) BS, no HSM  Extremities:no e/e/c        LABS:                        9.4    11.36 )-----------( 79       ( 03 Jan 2023 00:07 )             29.8     01-03    136  |  97  |  54<H>  ----------------------------<  146<H>  3.7   |  25  |  2.98<H>    Ca    8.3<L>      03 Jan 2023 00:07  Phos  4.3     01-03  Mg     2.3     01-03    TPro  6.8  /  Alb  3.0<L>  /  TBili  0.8  /  DBili  x   /  AST  29  /  ALT  19  /  AlkPhos  77  01-03            MICROBIOLOGY:    RECENT CULTURES:  01-02 @ 01:01 .Blood Blood-Peripheral                No growth to date.    01-01 @ 17:45 .Sputum Sputum       Few polymorphonuclear leukocytes per low power field  Few Squamous epithelial cells per low power field  Rare Gram positive cocci in pairs per oil power field           Normal Respiratory Christy present    12-30 @ 14:53 Clean Catch Clean Catch (Midstream)                <10,000 CFU/mL Normal Urogenital Christy    12-29 @ 00:56 .Blood Blood-Peripheral                No Growth Final    12-27 @ 19:30 .Blood Blood-Peripheral                No Growth Final    12-27 @ 16:44 .Blood Blood-Peripheral                No Growth Final          RESPIRATORY CULTURES:              RADIOLOGY & ADDITIONAL STUDIES:        Pager [phone]  After 5 pm/weekends or if no response :[phone]

## 2023-01-03 NOTE — PROGRESS NOTE ADULT - ATTENDING COMMENTS
#eric  ERIC/ ATN in the setting of STEMI, cardiac arrest & cardiogenic shock  currently nonoliguric however UO still < 1L  requiring HD for UF/HD for volume removal  had HD yesterday  plan UF today  #VO/pul edema  plan UF today  #hypotension-pressors per ct icu  #anemia - on MIGUEL  d/w CT ICU

## 2023-01-03 NOTE — PROGRESS NOTE ADULT - SUBJECTIVE AND OBJECTIVE BOX
[facility] Division of Kidney Diseases & Hypertension  FOLLOW UP NOTE  [phone]--------------------------------------------------------------------------------  Chief Complaint:Acute pancreatitis without infection or necrosis     HPI: 63 y/o male with PMH of CABG, DM, HTN, HLD presenting as transfer from [place] for c/f STEMI. Course c/b gallstone pancreatitis leading to ARDS and shock & cardiac arrest now on [facility]. Had significant troponin elevation and his LVEF down to 8%. Pt was deemed to be too unstable to have an angiogram and potential PCI due to his co-morbidities & a new subdural bleed. Pt being seen for ERIC. SCr on arrival was 2.15; trended down to hector 1.6 on 11/25 and eventually increased to 3.95 11/28. Pt received IV diuretics as per CTU. Pt initiated on CRRT on 12/5/22 to optimize volume status and electrolytes.  Pt underwent decannulation of ECMO on 12/8/22. Pt was restarted on 12/9/22 for volume overload. Pt underwent mitral clip OR (12/16/22). s/p trach on 12/16         24 hour events/subjective: Patient seen & examined. Labs & vitals reviewed. Remains on TC with FiO2 40%. Last HD yesterday with 2L UF. Required pressor while on HD & yet remains on it. s/p albumin 500cc overnight. CXR with b/l pulm vasc congestion. Complains of abd pain. UO in the last 24 hours 750cc         PAST HISTORY  --------------------------------------------------------------------------------  No significant changes to PMH, PSH, FHx, SHx, unless otherwise noted    ALLERGIES & MEDICATIONS  --------------------------------------------------------------------------------  Allergies    No Known Allergies    Intolerances      Standing Inpatient Medications  aMIOdarone    Tablet 200 milliGRAM(s) Oral daily  aspirin  chewable 81 milliGRAM(s) Oral daily  atorvastatin 80 milliGRAM(s) Oral at bedtime  caspofungin IVPB 50 milliGRAM(s) IV Intermittent every 24 hours  caspofungin IVPB      chlorhexidine 0.12% Liquid 15 milliLiter(s) Oral Mucosa every 12 hours  chlorhexidine 2% Cloths 1 Application(s) Topical <User Schedule>  dextrose 5%. 1000 milliLiter(s) IV Continuous <Continuous>  dextrose 50% Injectable 50 milliLiter(s) IV Push every 15 minutes  epoetin teena-epbx (RETACRIT) Injectable 5000 Unit(s) IV Push <User Schedule>  glucagon  Injectable 1 milliGRAM(s) IntraMuscular once  heparin   Injectable 5000 Unit(s) SubCutaneous every 8 hours  insulin lispro (ADMELOG) corrective regimen sliding scale   SubCutaneous every 4 hours  insulin NPH human recombinant 7 Unit(s) SubCutaneous every 6 hours  midodrine 20 milliGRAM(s) Oral every 8 hours  multivitamin/minerals/iron Oral Solution (CENTRUM) 15 milliLiter(s) Oral daily  pantoprazole  Injectable 40 milliGRAM(s) IV Push daily  phenylephrine    Infusion 0.144 MICROgram(s)/kG/Min IV Continuous <Continuous>  senna 2 Tablet(s) Oral at bedtime  sevelamer carbonate Powder 800 milliGRAM(s) Enteral Tube two times a day  thiamine 100 milliGRAM(s) Enteral Tube daily    PRN Inpatient Medications  acetaminophen    Suspension .. 650 milliGRAM(s) Oral every 6 hours PRN  dextrose Oral Gel 15 Gram(s) Oral once PRN  simethicone 80 milliGRAM(s) Chew daily PRN  sodium chloride 0.9% lock flush 10 milliLiter(s) IV Push every 1 hour PRN      REVIEW OF SYSTEMS  --------------------------------------------------------------------------------  Gen: No chills  Respiratory: No cough  CV: No chest pain  GI: +abdominal pain, no diarrhea,  nausea, vomiting  : No dysuria, hematuria  MSK:  + edema  Neuro: No dizziness/lightheadedness      All other systems were reviewed and are negative, except as noted.    VITALS/PHYSICAL EXAM  --------------------------------------------------------------------------------  T(C): 37.2 (01-03-23 @ 08:00), Max: 37.8 (01-03-23 @ 04:00)  HR: 89 (01-03-23 @ 11:45) (81 - 108)  BP: 101/58 (01-03-23 @ 04:00) (98/56 - 101/58)  RR: 27 (01-03-23 @ 11:45) (14 - 32)  SpO2: 100% (01-03-23 @ 11:45) (98% - 100%)  Wt(kg): --        01-02-23 @ 07:01  -  01-03-23 @ 07:00  --------------------------------------------------------  IN: 2727.2 mL / OUT: 2750 mL / NET: -22.8 mL    01-03-23 @ 07:01  -  01-03-23 @ 11:58  --------------------------------------------------------  IN: 201 mL / OUT: 80 mL / NET: 121 mL      Physical Exam:  	Gen: NAD  	HEENT: Anicteric  	Pulm: TC with 40% FiO2  	CV: S1S2+  	Abd: Soft, +BS      	Ext: trace LE edema B/L  	Neuro: Awake  	Skin: Warm and dry  	Vascular access: RIJ non tunneled catheter    LABS/STUDIES  --------------------------------------------------------------------------------              9.4    11.36 >-----------<  79       [01-03-23 @ 00:07]              29.8     132  |  94  |  65  ----------------------------<  128      [01-03-23 @ 08:36]  3.9   |  25  |  3.27        Ca     8.6     [01-03-23 @ 08:36]      Mg     2.3     [01-03-23 @ 00:07]      Phos  4.3     [01-03-23 @ 00:07]    TPro  7.0  /  Alb  3.6  /  TBili  0.8  /  DBili  x   /  AST  26  /  ALT  17  /  AlkPhos  71  [01-03-23 @ 08:36]          Creatinine Trend:  SCr 3.27 [01-03 @ 08:36]  SCr 2.98 [01-03 @ 00:07]  SCr 5.14 [01-02 @ 01:24]  SCr 4.56 [01-01 @ 00:40]  SCr 4.20 [12-31 @ 00:24]        Iron 39, TIBC 222, %sat 18      [12-31-22 @ 06:41]  Ferritin 346      [12-31-22 @ 06:42]       [facility] Division of Kidney Diseases & Hypertension  FOLLOW UP NOTE  [phone]--------------------------------------------------------------------------------  Chief Complaint:Acute pancreatitis without infection or necrosis     HPI: 61 y/o male with PMH of CABG, DM, HTN, HLD presenting as transfer from [place] for c/f STEMI. Course c/b gallstone pancreatitis leading to ARDS and shock & cardiac arrest now on [facility] ECMO. Had significant troponin elevation and his LVEF down to 8%. Pt was deemed to be too unstable to have an angiogram and potential PCI due to his co-morbidities & a new subdural bleed. Pt being seen for ERIC. SCr on arrival was 2.15; trended down to hector 1.6 on 11/25 and eventually increased to 3.95 11/28. Pt received IV diuretics as per CTU. Pt initiated on CRRT on 12/5/22 to optimize volume status and electrolytes.  Pt underwent decannulation of ECMO on 12/8/22. Pt was restarted on 12/9/22 for volume overload. Pt underwent mitral clip OR (12/16/22). s/p trach on 12/16         24 hour events/subjective: Patient seen & examined. Labs & vitals reviewed. Remains on TC with FiO2 40%. Last HD yesterday with 2L UF. Required pressor while on HD & yet remains on it. s/p albumin 500cc overnight. CXR with b/l pulm vasc congestion. Complains of abd pain. UO in the last 24 hours 750cc         PAST HISTORY  --------------------------------------------------------------------------------  No significant changes to PMH, PSH, FHx, SHx, unless otherwise noted    ALLERGIES & MEDICATIONS  --------------------------------------------------------------------------------  Allergies    No Known Allergies    Intolerances      Standing Inpatient Medications  aMIOdarone    Tablet 200 milliGRAM(s) Oral daily  aspirin  chewable 81 milliGRAM(s) Oral daily  atorvastatin 80 milliGRAM(s) Oral at bedtime  caspofungin IVPB 50 milliGRAM(s) IV Intermittent every 24 hours  caspofungin IVPB      chlorhexidine 0.12% Liquid 15 milliLiter(s) Oral Mucosa every 12 hours  chlorhexidine 2% Cloths 1 Application(s) Topical <User Schedule>  dextrose 5%. 1000 milliLiter(s) IV Continuous <Continuous>  dextrose 50% Injectable 50 milliLiter(s) IV Push every 15 minutes  epoetin teena-epbx (RETACRIT) Injectable 5000 Unit(s) IV Push <User Schedule>  glucagon  Injectable 1 milliGRAM(s) IntraMuscular once  heparin   Injectable 5000 Unit(s) SubCutaneous every 8 hours  insulin lispro (ADMELOG) corrective regimen sliding scale   SubCutaneous every 4 hours  insulin NPH human recombinant 7 Unit(s) SubCutaneous every 6 hours  midodrine 20 milliGRAM(s) Oral every 8 hours  multivitamin/minerals/iron Oral Solution (CENTRUM) 15 milliLiter(s) Oral daily  pantoprazole  Injectable 40 milliGRAM(s) IV Push daily  phenylephrine    Infusion 0.144 MICROgram(s)/kG/Min IV Continuous <Continuous>  senna 2 Tablet(s) Oral at bedtime  sevelamer carbonate Powder 800 milliGRAM(s) Enteral Tube two times a day  thiamine 100 milliGRAM(s) Enteral Tube daily    PRN Inpatient Medications  acetaminophen    Suspension .. 650 milliGRAM(s) Oral every 6 hours PRN  dextrose Oral Gel 15 Gram(s) Oral once PRN  simethicone 80 milliGRAM(s) Chew daily PRN  sodium chloride 0.9% lock flush 10 milliLiter(s) IV Push every 1 hour PRN      REVIEW OF SYSTEMS  --------------------------------------------------------------------------------  Gen: No chills  Respiratory: No cough  CV: No chest pain  GI: +abdominal pain, no diarrhea,  nausea, vomiting  : No dysuria, hematuria  MSK:  + edema  Neuro: No dizziness/lightheadedness      All other systems were reviewed and are negative, except as noted.    VITALS/PHYSICAL EXAM  --------------------------------------------------------------------------------  T(C): 37.2 (01-03-23 @ 08:00), Max: 37.8 (01-03-23 @ 04:00)  HR: 89 (01-03-23 @ 11:45) (81 - 108)  BP: 101/58 (01-03-23 @ 04:00) (98/56 - 101/58)  RR: 27 (01-03-23 @ 11:45) (14 - 32)  SpO2: 100% (01-03-23 @ 11:45) (98% - 100%)  Wt(kg): --        01-02-23 @ 07:01  -  01-03-23 @ 07:00  --------------------------------------------------------  IN: 2727.2 mL / OUT: 2750 mL / NET: -22.8 mL    01-03-23 @ 07:01  -  01-03-23 @ 11:58  --------------------------------------------------------  IN: 201 mL / OUT: 80 mL / NET: 121 mL      Physical Exam:  	Gen: NAD  	HEENT: Anicteric  	Pulm: TC with 40% FiO2  	CV: S1S2+  	Abd: Soft, +BS      	Ext: trace LE edema B/L  	Neuro: Awake  	Skin: Warm and dry  	Vascular access: RIJ non tunneled catheter    LABS/STUDIES  --------------------------------------------------------------------------------              9.4    11.36 >-----------<  79       [01-03-23 @ 00:07]              29.8     132  |  94  |  65  ----------------------------<  128      [01-03-23 @ 08:36]  3.9   |  25  |  3.27        Ca     8.6     [01-03-23 @ 08:36]      Mg     2.3     [01-03-23 @ 00:07]      Phos  4.3     [01-03-23 @ 00:07]    TPro  7.0  /  Alb  3.6  /  TBili  0.8  /  DBili  x   /  AST  26  /  ALT  17  /  AlkPhos  71  [01-03-23 @ 08:36]          Creatinine Trend:  SCr 3.27 [01-03 @ 08:36]  SCr 2.98 [01-03 @ 00:07]  SCr 5.14 [01-02 @ 01:24]  SCr 4.56 [01-01 @ 00:40]  SCr 4.20 [12-31 @ 00:24]        Iron 39, TIBC 222, %sat 18      [12-31-22 @ 06:41]  Ferritin 346      [12-31-22 @ 06:42]       [facility] Division of Kidney Diseases & Hypertension  FOLLOW UP NOTE  [phone]--------------------------------------------------------------------------------  Chief Complaint:Acute pancreatitis without infection or necrosis     HPI: 61 y/o male with PMH of CABG, DM, HTN, HLD presenting as transfer from [place] for c/f STEMI. Course c/b gallstone pancreatitis leading to ARDS and shock & cardiac arrest now on [facility] ECMO. Had significant troponin elevation and his LVEF down to 8%. Pt was deemed to be too unstable to have an angiogram and potential PCI due to his co-morbidities & a new subdural bleed. Pt being seen for ERIC. SCr on arrival was 2.15; trended down to hector 1.6 on 11/25 and eventually increased to 3.95 11/28. Pt received IV diuretics as per CTU. Pt initiated on CRRT on 12/5/22 to optimize volume status and electrolytes.  Pt underwent decannulation of ECMO on 12/8/22. Pt was restarted on 12/9/22 for volume overload. Pt underwent mitral clip OR (12/16/22). s/p trach on 12/16         24 hour events/subjective: Patient seen & examined. Labs & vitals reviewed. Remains on TC with FiO2 40%. Last HD yesterday with 2L UF. Required pressor while on HD & yet remains on it. s/p albumin 500cc overnight. CXR with b/l pulm vasc congestion. Complains of abd pain. UO in the last 24 hours 750cc         PAST HISTORY  --------------------------------------------------------------------------------  No significant changes to PMH, PSH, FHx, SHx, unless otherwise noted    ALLERGIES & MEDICATIONS  --------------------------------------------------------------------------------  Allergies    No Known Allergies    Intolerances      Standing Inpatient Medications  aMIOdarone    Tablet 200 milliGRAM(s) Oral daily  aspirin  chewable 81 milliGRAM(s) Oral daily  atorvastatin 80 milliGRAM(s) Oral at bedtime  caspofungin IVPB 50 milliGRAM(s) IV Intermittent every 24 hours  caspofungin IVPB      chlorhexidine 0.12% Liquid 15 milliLiter(s) Oral Mucosa every 12 hours  chlorhexidine 2% Cloths 1 Application(s) Topical <User Schedule>  dextrose 5%. 1000 milliLiter(s) IV Continuous <Continuous>  dextrose 50% Injectable 50 milliLiter(s) IV Push every 15 minutes  epoetin teena-epbx (RETACRIT) Injectable 5000 Unit(s) IV Push <User Schedule>  glucagon  Injectable 1 milliGRAM(s) IntraMuscular once  heparin   Injectable 5000 Unit(s) SubCutaneous every 8 hours  insulin lispro (ADMELOG) corrective regimen sliding scale   SubCutaneous every 4 hours  insulin NPH human recombinant 7 Unit(s) SubCutaneous every 6 hours  midodrine 20 milliGRAM(s) Oral every 8 hours  multivitamin/minerals/iron Oral Solution (CENTRUM) 15 milliLiter(s) Oral daily  pantoprazole  Injectable 40 milliGRAM(s) IV Push daily  phenylephrine    Infusion 0.144 MICROgram(s)/kG/Min IV Continuous <Continuous>  senna 2 Tablet(s) Oral at bedtime  sevelamer carbonate Powder 800 milliGRAM(s) Enteral Tube two times a day  thiamine 100 milliGRAM(s) Enteral Tube daily    PRN Inpatient Medications  acetaminophen    Suspension .. 650 milliGRAM(s) Oral every 6 hours PRN  dextrose Oral Gel 15 Gram(s) Oral once PRN  simethicone 80 milliGRAM(s) Chew daily PRN  sodium chloride 0.9% lock flush 10 milliLiter(s) IV Push every 1 hour PRN      REVIEW OF SYSTEMS  --------------------------------------------------------------------------------  Gen: No chills  Respiratory: No cough  CV: No chest pain  GI: +abdominal pain, no diarrhea,  nausea, vomiting  : No dysuria, hematuria  MSK:  + edema  Neuro: No dizziness/lightheadedness      All other systems were reviewed and are negative, except as noted.    VITALS/PHYSICAL EXAM  --------------------------------------------------------------------------------  T(C): 37.2 (01-03-23 @ 08:00), Max: 37.8 (01-03-23 @ 04:00)  HR: 89 (01-03-23 @ 11:45) (81 - 108)  BP: 101/58 (01-03-23 @ 04:00) (98/56 - 101/58)  RR: 27 (01-03-23 @ 11:45) (14 - 32)  SpO2: 100% (01-03-23 @ 11:45) (98% - 100%)  Wt(kg): --        01-02-23 @ 07:01  -  01-03-23 @ 07:00  --------------------------------------------------------  IN: 2727.2 mL / OUT: 2750 mL / NET: -22.8 mL    01-03-23 @ 07:01  -  01-03-23 @ 11:58  --------------------------------------------------------  IN: 201 mL / OUT: 80 mL / NET: 121 mL      Physical Exam:  	Gen: NAD  	HEENT: Anicteric  	Pulm: TC with 40% FiO2  	CV: S1S2+  	Abd: Soft, +BS      	Ext: trace LE edema B/L  	Neuro: Awake  	Skin: Warm and dry  	Vascular access: RIJ non tunneled catheter    LABS/STUDIES  --------------------------------------------------------------------------------              9.4    11.36 >-----------<  79       [01-03-23 @ 00:07]              29.8     132  |  94  |  65  ----------------------------<  128      [01-03-23 @ 08:36]  3.9   |  25  |  3.27        Ca     8.6     [01-03-23 @ 08:36]      Mg     2.3     [01-03-23 @ 00:07]      Phos  4.3     [01-03-23 @ 00:07]    TPro  7.0  /  Alb  3.6  /  TBili  0.8  /  DBili  x   /  AST  26  /  ALT  17  /  AlkPhos  71  [01-03-23 @ 08:36]          Creatinine Trend:  SCr 3.27 [01-03 @ 08:36]  SCr 2.98 [01-03 @ 00:07]  SCr 5.14 [01-02 @ 01:24]  SCr 4.56 [01-01 @ 00:40]  SCr 4.20 [12-31 @ 00:24]        Iron 39, TIBC 222, %sat 18      [12-31-22 @ 06:41]  Ferritin 346      [12-31-22 @ 06:42]

## 2023-01-03 NOTE — PROGRESS NOTE ADULT - ASSESSMENT
62M CAD s/p CABG, DM, HTN, presented as a trasnfer from [place] for STEMI, found to have pancreatitis not necrotizing and cholecystitis on CT AP, admitted to to SICU for respiratory failure due to ARDS. Moved to the CTU for ECMO for cardiogenic shock vs ARDS, ECMO decannulated on 12/8 with IABP placement and mitral clip and removed on 12/17, s/p trac on 12/16/22, now on trach collar.    #Candidemia  Unclear source  Bcx positive for candida tropicalis on 12/26/22  Repeat with no growth to date  Limited TTE shows no evidence for valvular vegetations, mitral clip in place    #ESRD on HD  Nephrology following    shiley in place      Continue caspofungin 50mg daily      Follow repeat blood cultures - negative so far                              62M CAD s/p CABG, DM, HTN, presented as a trasnfer from [place] for STEMI, found to have pancreatitis not necrotizing and cholecystitis on CT AP, admitted to to SICU for respiratory failure due to ARDS. Moved to the CTU for ECMO for cardiogenic shock vs ARDS, ECMO decannulated on 12/8 with IABP placement and mitral clip and removed on 12/17, s/p trac on 12/16/22, now on trach collar.    #Candidemia  Unclear source  Bcx positive for candida tropicalis on 12/26/22  Repeat with no growth to date  Limited TTE shows no evidence for valvular vegetations, mitral clip in place    #ESRD on HD  Nephrology following    shiley in place      Continue caspofungin 50mg daily      Follow repeat blood cultures - negative so far                              62M CAD s/p CABG, DM, HTN, presented as a trasnfer from [place] for STEMI, found to have pancreatitis not necrotizing and cholecystitis on CT AP, admitted to to SICU for respiratory failure due to ARDS. Moved to the CTU for ECMO for cardiogenic shock vs ARDS, ECMO decannulated on 12/8 with IABP placement and mitral clip and removed on 12/17, s/p trac on 12/16/22, now on trach collar.    #Candidemia  Unclear source  Bcx positive for candida tropicalis on 12/26/22  Repeat with no growth to date  Limited TTE shows no evidence for valvular vegetations, mitral clip in place    #ESRD on HD  Nephrology following    shiley in place      Continue caspofungin 50mg daily      Follow repeat blood cultures - negative so far

## 2023-01-03 NOTE — PROGRESS NOTE ADULT - SUBJECTIVE AND OBJECTIVE BOX
Patient seen and examined at the bedside.    Remained critically ill on continuous ICU monitoring.    OBJECTIVE:  Vital Signs Last 24 Hrs  T(C): 37.2 (03 Jan 2023 16:00), Max: 37.8 (03 Jan 2023 04:00)  T(F): 99 (03 Jan 2023 16:00), Max: 100 (03 Jan 2023 04:00)  HR: 91 (03 Jan 2023 19:00) (81 - 108)  BP: 101/58 (03 Jan 2023 04:00) (98/56 - 101/58)  BP(mean): 76 (03 Jan 2023 04:00) (73 - 76)  RR: 22 (03 Jan 2023 19:00) (14 - 32)  SpO2: 100% (03 Jan 2023 19:00) (97% - 100%)    Parameters below as of 03 Jan 2023 16:00  Patient On (Oxygen Delivery Method): tracheostomy collar    O2 Concentration (%): 40      Physical Exam:   General: NAD   Neurology: nonfocal   Eyes: bilateral pupils equal and reactive   ENT/Neck: Neck supple, trachea midline, No JVD   Respiratory: Clear bilaterally   CV: S1S2, no murmurs        [x] Sternal dressing, [x] Mediastinal CT, [x] Pleural CT, [x] JOSÉ LUIS drain        [x] Sinus rhythm, [x] Afib, [x] Temporary pacing, [x] PPM  Abdominal: Soft, NT, ND +BS   Extremities: 1-2+ pedal edema noted, + peripheral pulses   Skin: No Rashes, Hematoma, Ecchymosis                           Assessment:  63 y/o M admitted with a STEMI and cardiogenic shock.     Cardiogenic shock s/p VA ECMO decannulated 12/8  Mitral regurgitation s/p Mitral clip x1 12/16/22  Acute respiratory distress/failure s/p Tracheostomy 12/16/22   At high risk for hemodynamic instability  ERIC/Renal failure  Fungemia  Anemia  Thrombocytopenia       Plan:   ***Neuro***  CT head showed 4mm subdural hematoma 11/26: repeat CT head unchanged 12/01  Soft palate laceration, ENT scoped 12/8, stable, no acute intervention at this time  Repeat Head CT 12/13 Similar minimal increased density along the right aspect of the posterior   falx and right tentorial leaf, consistent with minimal residual subdural   hemorrhage.  No parenchymal hemorrhage or brain edema.  [x] Nonfocal  OOBTC  PT as tolerated    ***Cardiovascular***  Invasive hemodynamic monitoring, assess perfusion indices   SR / MAP 67/ Hct 29.8/ Lactate 1.1  Reassessment of hemodynamics post resuscitation   Amiodarone for rate control   Midodrine 20 mg q8  Holding Lopressor   [x] VTE prophylaxis with SubQ Heparin   [x] ASA [x] Statin   Serial EKG and cardiac enzymes   L arm precautions for AVF planning     ***Pulmonary***  Trach Collar since 5 AM today  Post op vent management   Titration of FiO2 and PEEP, follow SpO2, CXR, blood gasses     Mode: standby            ***GI***  [x] Diet: Tolerating TF Glucerna 1.2, @ goal 65cc/hr.  [x] Protonix   Bowel regimen with miralax and senna   Simethicone PRN gas    ***Renal***   [x] ERIC- HD again today, removed 2 kg   Continue to monitor I/Os, BUN/Creatinine.   Replete lytes PRN  De Dios present     ***ID***  Candidemia - On Caspofungin  12/26 Bcx +clinton 12/27 12/29 BCx NGTD   12/30 UCx NGTD 1/1 SCx NGTD   1/2 BCx Gram Positive Cocci in Clusters    ***Endocrine***  [x]  DM2: HbA1c 7.3%                - [x] ISS  [x] NPH              - Need tight glycemic control to prevent wound infection.        Patient requires continuous monitoring with bedside rhythm monitoring, pulse oximetry monitoring, and continuous central venous and arterial pressure monitoring; and intermittent blood gas analysis. Care plan discussed with the ICU care team.   Patient remained critical, at risk for life threatening decompensation.    I have spent 30 minutes providing critical care management to this patient.    By signing my name below, I, Maria D'Amico, attest that this documentation has been prepared under the direction and in the presence of BROOKE Yap   Electronically signed: Maria D'Amico, Scribe, 01-03-23 @ 20:48    I, BROOKE Yap , personally performed the services described in this documentation. all medical record entries made by the karlaibkyle were at my direction and in my presence. I have reviewed the chart and agree that the record reflects my personal performance and is accurate and complete  Electronically signed: BROOKE Yap  Patient seen and examined at the bedside.    Remained critically ill on continuous ICU monitoring.    OBJECTIVE:  Vital Signs Last 24 Hrs  T(C): 37.2 (03 Jan 2023 16:00), Max: 37.8 (03 Jan 2023 04:00)  T(F): 99 (03 Jan 2023 16:00), Max: 100 (03 Jan 2023 04:00)  HR: 91 (03 Jan 2023 19:00) (81 - 108)  BP: 101/58 (03 Jan 2023 04:00) (98/56 - 101/58)  BP(mean): 76 (03 Jan 2023 04:00) (73 - 76)  RR: 22 (03 Jan 2023 19:00) (14 - 32)  SpO2: 100% (03 Jan 2023 19:00) (97% - 100%)    Parameters below as of 03 Jan 2023 16:00  Patient On (Oxygen Delivery Method): tracheostomy collar    O2 Concentration (%): 40      Physical Exam:   General: NAD   Neurology: nonfocal   Eyes: bilateral pupils equal and reactive   ENT/Neck: Neck supple, trachea midline, No JVD   Respiratory: Clear bilaterally   CV: S1S2, no murmurs        [x] Sternal dressing, [x] Mediastinal CT, [x] Pleural CT, [x] JOSÉ LUIS drain        [x] Sinus rhythm, [x] Afib, [x] Temporary pacing, [x] PPM  Abdominal: Soft, NT, ND +BS   Extremities: 1-2+ pedal edema noted, + peripheral pulses   Skin: No Rashes, Hematoma, Ecchymosis                           Assessment:  61 y/o M admitted with a STEMI and cardiogenic shock.     Cardiogenic shock s/p VA ECMO decannulated 12/8  Mitral regurgitation s/p Mitral clip x1 12/16/22  Acute respiratory distress/failure s/p Tracheostomy 12/16/22   At high risk for hemodynamic instability  ERIC/Renal failure  Fungemia  Anemia  Thrombocytopenia       Plan:   ***Neuro***  CT head showed 4mm subdural hematoma 11/26: repeat CT head unchanged 12/01  Soft palate laceration, ENT scoped 12/8, stable, no acute intervention at this time  Repeat Head CT 12/13 Similar minimal increased density along the right aspect of the posterior   falx and right tentorial leaf, consistent with minimal residual subdural   hemorrhage.  No parenchymal hemorrhage or brain edema.  [x] Nonfocal  OOBTC  PT as tolerated    ***Cardiovascular***  Invasive hemodynamic monitoring, assess perfusion indices   SR / MAP 67/ Hct 29.8/ Lactate 1.1  Reassessment of hemodynamics post resuscitation   Amiodarone for rate control   Midodrine 20 mg q8  Holding Lopressor   [x] VTE prophylaxis with SubQ Heparin   [x] ASA [x] Statin   Serial EKG and cardiac enzymes   L arm precautions for AVF planning     ***Pulmonary***  Trach Collar since 5 AM today  Post op vent management   Titration of FiO2 and PEEP, follow SpO2, CXR, blood gasses     Mode: standby            ***GI***  [x] Diet: Tolerating TF Glucerna 1.2, @ goal 65cc/hr.  [x] Protonix   Bowel regimen with miralax and senna   Simethicone PRN gas    ***Renal***   [x] ERIC- HD again today, removed 2 kg   Continue to monitor I/Os, BUN/Creatinine.   Replete lytes PRN  De Dios present     ***ID***  Candidemia - On Caspofungin  12/26 Bcx +clinton 12/27 12/29 BCx NGTD   12/30 UCx NGTD 1/1 SCx NGTD   1/2 BCx Gram Positive Cocci in Clusters    ***Endocrine***  [x]  DM2: HbA1c 7.3%                - [x] ISS  [x] NPH              - Need tight glycemic control to prevent wound infection.        Patient requires continuous monitoring with bedside rhythm monitoring, pulse oximetry monitoring, and continuous central venous and arterial pressure monitoring; and intermittent blood gas analysis. Care plan discussed with the ICU care team.   Patient remained critical, at risk for life threatening decompensation.    I have spent 30 minutes providing critical care management to this patient.    By signing my name below, I, Maria D'Amico, attest that this documentation has been prepared under the direction and in the presence of BROOKE Yap   Electronically signed: Maria D'Amico, Scribe, 01-03-23 @ 20:48    I, BROOKE Yap , personally performed the services described in this documentation. all medical record entries made by the karlaibkyle were at my direction and in my presence. I have reviewed the chart and agree that the record reflects my personal performance and is accurate and complete  Electronically signed: BROOKE Yap  Patient seen and examined at the bedside.    Remained critically ill on continuous ICU monitoring.    OBJECTIVE:  Vital Signs Last 24 Hrs  T(C): 37.2 (03 Jan 2023 16:00), Max: 37.8 (03 Jan 2023 04:00)  T(F): 99 (03 Jan 2023 16:00), Max: 100 (03 Jan 2023 04:00)  HR: 91 (03 Jan 2023 19:00) (81 - 108)  BP: 101/58 (03 Jan 2023 04:00) (98/56 - 101/58)  BP(mean): 76 (03 Jan 2023 04:00) (73 - 76)  RR: 22 (03 Jan 2023 19:00) (14 - 32)  SpO2: 100% (03 Jan 2023 19:00) (97% - 100%)    Parameters below as of 03 Jan 2023 16:00  Patient On (Oxygen Delivery Method): tracheostomy collar    O2 Concentration (%): 40      Physical Exam:   General: NAD   Neurology: nonfocal   Eyes: bilateral pupils equal and reactive   ENT/Neck: Neck supple, trachea midline, No JVD, moderate secretions, pt able to cough out by himself   Respiratory: Clear bilaterally   CV: S1S2, no murmurs        [x] Sinus rhythm  Abdominal: Soft, non-distended, mildly TTP, +BS   Extremities: 1-2+ pedal edema noted, + peripheral pulses   Skin: No Rashes, Hematoma, Ecchymosis                           Assessment:  61 y/o M admitted with a STEMI and cardiogenic shock.     Cardiogenic shock s/p VA ECMO decannulated 12/8  Mitral regurgitation s/p Mitral clip x1 12/16/22  Acute respiratory distress/failure s/p Tracheostomy 12/16/22   At high risk for hemodynamic instability  ERIC/Renal failure  Fungemia  Anemia  Thrombocytopenia       Plan:   ***Neuro***  CT head showed 4mm subdural hematoma 11/26: repeat CT head unchanged 12/01  Soft palate laceration, ENT scoped 12/8, stable, no acute intervention at this time  Repeat Head CT 12/13 Similar minimal increased density along the right aspect of the posterior   falx and right tentorial leaf, consistent with minimal residual subdural   hemorrhage.  No parenchymal hemorrhage or brain edema.  [x] Nonfocal  OOBTC  PT as tolerated    ***Cardiovascular***  Invasive hemodynamic monitoring, assess perfusion indices   SR / MAP 67/ Hct 29.8/ Lactate 1.1  Reassessment of hemodynamics post resuscitation   Amiodarone for rate control   Midodrine 20 mg q8  Holding Lopressor   [x] VTE prophylaxis with SubQ Heparin   [x] ASA [x] Statin   L arm precautions for AVF planning     ***Pulmonary***  Trach Collar since 5 AM today  Post op vent management   Titration of FiO2 and PEEP, follow SpO2, CXR, blood gasses   Rests ovn on vent for several hours    Mode: standby            ***GI***  [x] Diet: Tolerating TF Glucerna 1.2, @ goal 65cc/hr.  [x] Protonix   Bowel regimen with miralax and senna   Simethicone PRN gas    ***Renal***   [x] ERIC- HD again today, removed 2 kg   Continue to monitor I/Os, BUN/Creatinine.   Replete lytes PRN  De Dios present     ***ID***  Candidemia - On Caspofungin  12/26 Bcx +clinton 12/27 12/29 BCx NGTD   12/30 UCx NGTD 1/1 SCx NGTD   1/2 BCx Gram Positive Cocci in Clusters > s/p vanco, will check dose by level    ***Endocrine***  [x]  DM2: HbA1c 7.3%                - [x] ISS  [x] NPH              - Need tight glycemic control to prevent wound infection.        Patient requires continuous monitoring with bedside rhythm monitoring, pulse oximetry monitoring, and continuous central venous and arterial pressure monitoring; and intermittent blood gas analysis. Care plan discussed with the ICU care team.   Patient remained critical, at risk for life threatening decompensation.    I have spent 35 minutes providing critical care management to this patient.    By signing my name below, I, Maria D'Amico, attest that this documentation has been prepared under the direction and in the presence of BROOKE Yap   Electronically signed: Maria D'Amico, Scribe, 01-03-23 @ 20:48    I, BROOKE Yap , personally performed the services described in this documentation. all medical record entries made by the karlaibkyle were at my direction and in my presence. I have reviewed the chart and agree that the record reflects my personal performance and is accurate and complete  Electronically signed: BROOKE Yap

## 2023-01-04 LAB
ALBUMIN SERPL ELPH-MCNC: 3.3 G/DL — SIGNIFICANT CHANGE UP (ref 3.3–5)
ALP SERPL-CCNC: 72 U/L — SIGNIFICANT CHANGE UP (ref 40–120)
ALT FLD-CCNC: 15 U/L — SIGNIFICANT CHANGE UP (ref 10–45)
ANION GAP SERPL CALC-SCNC: 17 MMOL/L — SIGNIFICANT CHANGE UP (ref 5–17)
AST SERPL-CCNC: 24 U/L — SIGNIFICANT CHANGE UP (ref 10–40)
BILIRUB SERPL-MCNC: 0.7 MG/DL — SIGNIFICANT CHANGE UP (ref 0.2–1.2)
BUN SERPL-MCNC: 76 MG/DL — HIGH (ref 7–23)
CALCIUM SERPL-MCNC: 8.6 MG/DL — SIGNIFICANT CHANGE UP (ref 8.4–10.5)
CHLORIDE SERPL-SCNC: 95 MMOL/L — LOW (ref 96–108)
CO2 SERPL-SCNC: 22 MMOL/L — SIGNIFICANT CHANGE UP (ref 22–31)
CREAT SERPL-MCNC: 3.71 MG/DL — HIGH (ref 0.5–1.3)
CULTURE RESULTS: SIGNIFICANT CHANGE UP
EGFR: 18 ML/MIN/1.73M2 — LOW
GLUCOSE BLDC GLUCOMTR-MCNC: 126 MG/DL — HIGH (ref 70–99)
GLUCOSE BLDC GLUCOMTR-MCNC: 135 MG/DL — HIGH (ref 70–99)
GLUCOSE BLDC GLUCOMTR-MCNC: 139 MG/DL — HIGH (ref 70–99)
GLUCOSE BLDC GLUCOMTR-MCNC: 141 MG/DL — HIGH (ref 70–99)
GLUCOSE BLDC GLUCOMTR-MCNC: 145 MG/DL — HIGH (ref 70–99)
GLUCOSE BLDC GLUCOMTR-MCNC: 158 MG/DL — HIGH (ref 70–99)
GLUCOSE BLDC GLUCOMTR-MCNC: 161 MG/DL — HIGH (ref 70–99)
GLUCOSE SERPL-MCNC: 162 MG/DL — HIGH (ref 70–99)
HBV SURFACE AG SER-ACNC: SIGNIFICANT CHANGE UP
HCT VFR BLD CALC: 30 % — LOW (ref 39–50)
HGB BLD-MCNC: 9.4 G/DL — LOW (ref 13–17)
MAGNESIUM SERPL-MCNC: 2.5 MG/DL — SIGNIFICANT CHANGE UP (ref 1.6–2.6)
MCHC RBC-ENTMCNC: 29.9 PG — SIGNIFICANT CHANGE UP (ref 27–34)
MCHC RBC-ENTMCNC: 31.3 GM/DL — LOW (ref 32–36)
MCV RBC AUTO: 95.5 FL — SIGNIFICANT CHANGE UP (ref 80–100)
NRBC # BLD: 0 /100 WBCS — SIGNIFICANT CHANGE UP (ref 0–0)
ORGANISM # SPEC MICROSCOPIC CNT: SIGNIFICANT CHANGE UP
PHOSPHATE SERPL-MCNC: 6.3 MG/DL — HIGH (ref 2.5–4.5)
PLATELET # BLD AUTO: 97 K/UL — LOW (ref 150–400)
POTASSIUM SERPL-MCNC: 4.5 MMOL/L — SIGNIFICANT CHANGE UP (ref 3.5–5.3)
POTASSIUM SERPL-SCNC: 4.5 MMOL/L — SIGNIFICANT CHANGE UP (ref 3.5–5.3)
PROT SERPL-MCNC: 6.9 G/DL — SIGNIFICANT CHANGE UP (ref 6–8.3)
RBC # BLD: 3.14 M/UL — LOW (ref 4.2–5.8)
RBC # FLD: 17.6 % — HIGH (ref 10.3–14.5)
SODIUM SERPL-SCNC: 134 MMOL/L — LOW (ref 135–145)
SPECIMEN SOURCE: SIGNIFICANT CHANGE UP
VANCOMYCIN TROUGH SERPL-MCNC: 12.8 UG/ML — SIGNIFICANT CHANGE UP (ref 10–20)
WBC # BLD: 12.7 K/UL — HIGH (ref 3.8–10.5)
WBC # FLD AUTO: 12.7 K/UL — HIGH (ref 3.8–10.5)

## 2023-01-04 PROCEDURE — 71045 X-RAY EXAM CHEST 1 VIEW: CPT | Mod: 26

## 2023-01-04 PROCEDURE — 74018 RADEX ABDOMEN 1 VIEW: CPT | Mod: 26

## 2023-01-04 PROCEDURE — 99291 CRITICAL CARE FIRST HOUR: CPT

## 2023-01-04 PROCEDURE — 99233 SBSQ HOSP IP/OBS HIGH 50: CPT | Mod: GC

## 2023-01-04 PROCEDURE — 99233 SBSQ HOSP IP/OBS HIGH 50: CPT

## 2023-01-04 PROCEDURE — 99232 SBSQ HOSP IP/OBS MODERATE 35: CPT

## 2023-01-04 RX ORDER — CALCIUM GLUCONATE 100 MG/ML
2 VIAL (ML) INTRAVENOUS ONCE
Refills: 0 | Status: COMPLETED | OUTPATIENT
Start: 2023-01-04 | End: 2023-01-04

## 2023-01-04 RX ORDER — LACTULOSE 10 G/15ML
10 SOLUTION ORAL EVERY 8 HOURS
Refills: 0 | Status: COMPLETED | OUTPATIENT
Start: 2023-01-04 | End: 2023-01-05

## 2023-01-04 RX ADMIN — ERYTHROPOIETIN 5000 UNIT(S): 10000 INJECTION, SOLUTION INTRAVENOUS; SUBCUTANEOUS at 10:13

## 2023-01-04 RX ADMIN — MIDODRINE HYDROCHLORIDE 20 MILLIGRAM(S): 2.5 TABLET ORAL at 05:16

## 2023-01-04 RX ADMIN — Medication 200 GRAM(S): at 00:38

## 2023-01-04 RX ADMIN — SIMETHICONE 80 MILLIGRAM(S): 80 TABLET, CHEWABLE ORAL at 00:20

## 2023-01-04 RX ADMIN — POLYETHYLENE GLYCOL 3350 17 GRAM(S): 17 POWDER, FOR SOLUTION ORAL at 11:25

## 2023-01-04 RX ADMIN — MIDODRINE HYDROCHLORIDE 20 MILLIGRAM(S): 2.5 TABLET ORAL at 21:10

## 2023-01-04 RX ADMIN — Medication 3: at 01:14

## 2023-01-04 RX ADMIN — AMIODARONE HYDROCHLORIDE 200 MILLIGRAM(S): 400 TABLET ORAL at 05:16

## 2023-01-04 RX ADMIN — Medication 81 MILLIGRAM(S): at 11:25

## 2023-01-04 RX ADMIN — ATORVASTATIN CALCIUM 80 MILLIGRAM(S): 80 TABLET, FILM COATED ORAL at 21:09

## 2023-01-04 RX ADMIN — HUMAN INSULIN 7 UNIT(S): 100 INJECTION, SUSPENSION SUBCUTANEOUS at 17:52

## 2023-01-04 RX ADMIN — CHLORHEXIDINE GLUCONATE 15 MILLILITER(S): 213 SOLUTION TOPICAL at 17:48

## 2023-01-04 RX ADMIN — HEPARIN SODIUM 5000 UNIT(S): 5000 INJECTION INTRAVENOUS; SUBCUTANEOUS at 00:38

## 2023-01-04 RX ADMIN — LACTULOSE 10 GRAM(S): 10 SOLUTION ORAL at 11:32

## 2023-01-04 RX ADMIN — SEVELAMER CARBONATE 800 MILLIGRAM(S): 2400 POWDER, FOR SUSPENSION ORAL at 05:16

## 2023-01-04 RX ADMIN — HUMAN INSULIN 7 UNIT(S): 100 INJECTION, SUSPENSION SUBCUTANEOUS at 11:44

## 2023-01-04 RX ADMIN — PANTOPRAZOLE SODIUM 40 MILLIGRAM(S): 20 TABLET, DELAYED RELEASE ORAL at 11:40

## 2023-01-04 RX ADMIN — SENNA PLUS 2 TABLET(S): 8.6 TABLET ORAL at 21:09

## 2023-01-04 RX ADMIN — SEVELAMER CARBONATE 800 MILLIGRAM(S): 2400 POWDER, FOR SUSPENSION ORAL at 17:49

## 2023-01-04 RX ADMIN — LACTULOSE 10 GRAM(S): 10 SOLUTION ORAL at 20:50

## 2023-01-04 RX ADMIN — HEPARIN SODIUM 5000 UNIT(S): 5000 INJECTION INTRAVENOUS; SUBCUTANEOUS at 16:46

## 2023-01-04 RX ADMIN — MIDODRINE HYDROCHLORIDE 20 MILLIGRAM(S): 2.5 TABLET ORAL at 09:32

## 2023-01-04 RX ADMIN — HUMAN INSULIN 7 UNIT(S): 100 INJECTION, SUSPENSION SUBCUTANEOUS at 00:13

## 2023-01-04 RX ADMIN — CASPOFUNGIN ACETATE 260 MILLIGRAM(S): 7 INJECTION, POWDER, LYOPHILIZED, FOR SOLUTION INTRAVENOUS at 17:49

## 2023-01-04 RX ADMIN — CHLORHEXIDINE GLUCONATE 15 MILLILITER(S): 213 SOLUTION TOPICAL at 05:16

## 2023-01-04 RX ADMIN — HUMAN INSULIN 7 UNIT(S): 100 INJECTION, SUSPENSION SUBCUTANEOUS at 05:24

## 2023-01-04 RX ADMIN — Medication 100 MILLIGRAM(S): at 11:25

## 2023-01-04 RX ADMIN — HEPARIN SODIUM 5000 UNIT(S): 5000 INJECTION INTRAVENOUS; SUBCUTANEOUS at 09:07

## 2023-01-04 RX ADMIN — Medication 15 MILLILITER(S): at 11:25

## 2023-01-04 RX ADMIN — Medication 3: at 05:22

## 2023-01-04 NOTE — PROGRESS NOTE ADULT - PROBLEM SELECTOR PLAN 1
- ischemic with most recent TTE 12/29 showing EF 22%  - unable to tolerate GDMT at this time given borderline BP on midodrine 20 mg TID  - volume management with daily HD/UF

## 2023-01-04 NOTE — PROGRESS NOTE ADULT - PROBLEM SELECTOR PLAN 1
Pt with non oliguric ERIC/ ATN in the setting of STEMI, cardiac arrest & cardiogenic shock  SCr on arrival was 2.15; trended down to hector 1.6 on 11/25; slowly trended up & increased to 3.95 11/28. Pt initiated on CRRT/CVVHDF to optimize volume and electrolytes on 12/5/22. Pt likely with ATN. Pt underwent decannulation of ECMO on 12/8/22 and was taken off CRRT.     Pt reinitiated on CRRT overnight on 12/9/22 for volume overload. s/p trach on 12/16. CRRT stopped again 12/19. Bladder scan daily. Now with De Dios. Off Lasix gtt.     Getting daily HD/UF. Remains on TC with FiO2 40%.  Last UF session yesterday with 2L UF. Last HD on 1/2.  CXR with b/l pulm vasc congestion. Will do HD session today with 2L UF. Consider using Midodrine before/during HD so that Pt. can tolerate session.  Will need tunneled catheter placed for ongoing dialysis needs once cleared from infectious standpoint.   Continue to monitor for renal recovery.    Monitor labs and urine output. Avoid any potential nephrotoxins. Dose medications as per HD.

## 2023-01-04 NOTE — PROGRESS NOTE ADULT - SUBJECTIVE AND OBJECTIVE BOX
ADVANCED HEART FAILURE & TRANSPLANT  - PROGRESS NOTE  *To reach the NS1 Team from 8am to 5pm, please call [phone].  _______________________________________________________________________________________________________    Subjective:  - NAEO  - tolerating daily HD/UF    Medications:  acetaminophen    Suspension .. 650 milliGRAM(s) Oral every 6 hours PRN  aMIOdarone    Tablet 200 milliGRAM(s) Oral daily  aspirin  chewable 81 milliGRAM(s) Oral daily  atorvastatin 80 milliGRAM(s) Oral at bedtime  caspofungin IVPB 50 milliGRAM(s) IV Intermittent every 24 hours  caspofungin IVPB      chlorhexidine 0.12% Liquid 15 milliLiter(s) Oral Mucosa every 12 hours  chlorhexidine 2% Cloths 1 Application(s) Topical <User Schedule>  dextrose 5%. 1000 milliLiter(s) IV Continuous <Continuous>  dextrose 50% Injectable 50 milliLiter(s) IV Push every 15 minutes  dextrose Oral Gel 15 Gram(s) Oral once PRN  epoetin teena-epbx (RETACRIT) Injectable 5000 Unit(s) IV Push <User Schedule>  glucagon  Injectable 1 milliGRAM(s) IntraMuscular once  heparin   Injectable 5000 Unit(s) SubCutaneous every 8 hours  insulin lispro (ADMELOG) corrective regimen sliding scale   SubCutaneous every 4 hours  insulin NPH human recombinant 7 Unit(s) SubCutaneous every 6 hours  lactulose Syrup 10 Gram(s) Oral every 8 hours  midodrine 20 milliGRAM(s) Oral every 8 hours  multivitamin/minerals/iron Oral Solution (CENTRUM) 15 milliLiter(s) Oral daily  pantoprazole  Injectable 40 milliGRAM(s) IV Push daily  phenylephrine    Infusion 0.144 MICROgram(s)/kG/Min IV Continuous <Continuous>  polyethylene glycol 3350 17 Gram(s) Oral daily  senna 2 Tablet(s) Oral at bedtime  sevelamer carbonate Powder 800 milliGRAM(s) Enteral Tube two times a day  simethicone 80 milliGRAM(s) Chew daily PRN  sodium chloride 0.9% lock flush 10 milliLiter(s) IV Push every 1 hour PRN  thiamine 100 milliGRAM(s) Enteral Tube daily      ICU Vital Signs Last 24 Hrs  T(C): 36.9, Max: 36.9 ( @ 19:00)  HR: 85 (78 - 96)  BP: 105/58 (105/58 - 105/58)  BP(mean): 77 (77 - 77)  ABP: 121/46 (39/26 - 158/64)  ABP(mean): 69 (29 - 93)  RR: 23 (12 - 28)  SpO2: 100% (95% - 100%)    Weight in k.8 (23)  Weight in k.8 (23)      I&O's Summary Last 24 Hrs    IN: 2308.5 mL / OUT: 2520 mL / NET: -211.5 mL      Tele: SR 70-90s    Physical Exam:    General: No distress. Comfortable.  HEENT: EOM intact.  Neck: JVP 14-16 cm H2O  Respiratory: Clear to auscultation bilaterally  CV: RRR. Normal S1 and S2. No murmurs, rub, or gallops. Radial pulses normal.  Abdomen: Soft, non-distended, non-tender  Skin: No skin lesions  Extremities: Warm, trace BLE edema  Neurology: Non-focal, alert and oriented times three.   Psych: Affect normal    Labs:    ( 23 @ 00:28 )               9.4    12.70 )--------( 97                   30.0     ( 23 @ 00:27 )     134  |  95  |  76  ---------------------<  162  4.5  |  22  |  3.71    Ca 8.6  Phos 6.3  Mg 2.5    ( 23 @ 00:27 )  TPro  6.9  /  Alb  3.3  /  TBili  0.7  /  DBili  x   /  AST  24  /  ALT  15  /  AlkPhos  72  ( 23 @ 08:36 )  TPro  7.0  /  Alb  3.6  /  TBili  0.8  /  DBili  x   /  AST  26  /  ALT  17  /  AlkPhos  71    ( 22 @ 10:55 )  TropHS 1085  / CK 27    / CKMB x        ABG - ( 23 @ 23:53 )  pH: 7.43  / pCO2: 37    / pO2: 89    / HCO3: 25    / Base Excess: 0.4   / SaO2: 98.1    ADVANCED HEART FAILURE & TRANSPLANT  - PROGRESS NOTE  *To reach the NS1 Team from 8am to 5pm, please call [phone].  _______________________________________________________________________________________________________    Subjective:  - NAEO  - tolerating daily HD/UF    Medications:  acetaminophen    Suspension .. 650 milliGRAM(s) Oral every 6 hours PRN  aMIOdarone    Tablet 200 milliGRAM(s) Oral daily  aspirin  chewable 81 milliGRAM(s) Oral daily  atorvastatin 80 milliGRAM(s) Oral at bedtime  caspofungin IVPB 50 milliGRAM(s) IV Intermittent every 24 hours  caspofungin IVPB      chlorhexidine 0.12% Liquid 15 milliLiter(s) Oral Mucosa every 12 hours  chlorhexidine 2% Cloths 1 Application(s) Topical <User Schedule>  dextrose 5%. 1000 milliLiter(s) IV Continuous <Continuous>  dextrose 50% Injectable 50 milliLiter(s) IV Push every 15 minutes  dextrose Oral Gel 15 Gram(s) Oral once PRN  epoetin teena-epbx (RETACRIT) Injectable 5000 Unit(s) IV Push <User Schedule>  glucagon  Injectable 1 milliGRAM(s) IntraMuscular once  heparin   Injectable 5000 Unit(s) SubCutaneous every 8 hours  insulin lispro (ADMELOG) corrective regimen sliding scale   SubCutaneous every 4 hours  insulin NPH human recombinant 7 Unit(s) SubCutaneous every 6 hours  lactulose Syrup 10 Gram(s) Oral every 8 hours  midodrine 20 milliGRAM(s) Oral every 8 hours  multivitamin/minerals/iron Oral Solution (CENTRUM) 15 milliLiter(s) Oral daily  pantoprazole  Injectable 40 milliGRAM(s) IV Push daily  phenylephrine    Infusion 0.144 MICROgram(s)/kG/Min IV Continuous <Continuous>  polyethylene glycol 3350 17 Gram(s) Oral daily  senna 2 Tablet(s) Oral at bedtime  sevelamer carbonate Powder 800 milliGRAM(s) Enteral Tube two times a day  simethicone 80 milliGRAM(s) Chew daily PRN  sodium chloride 0.9% lock flush 10 milliLiter(s) IV Push every 1 hour PRN  thiamine 100 milliGRAM(s) Enteral Tube daily      ICU Vital Signs Last 24 Hrs  T(C): 36.9, Max: 36.9 ( @ 19:00)  HR: 85 (78 - 96)  BP: 105/58 (105/58 - 105/58)  BP(mean): 77 (77 - 77)  ABP: 121/46 (39/26 - 158/64)  ABP(mean): 69 (29 - 93)  RR: 23 (12 - 28)  SpO2: 100% (95% - 100%)    Weight in k.8 (23)  Weight in k.8 (23)      I&O's Summary Last 24 Hrs    IN: 2308.5 mL / OUT: 2520 mL / NET: -211.5 mL      Tele: SR 70-90s    Physical Exam:    General: No distress. Comfortable.  HEENT: EOM intact.  Neck: JVP 14-16 cm H2O  Respiratory: Clear to auscultation bilaterally  CV: RRR. Normal S1 and S2. No murmurs, rub, or gallops. Radial pulses normal.  Abdomen: Soft, non-distended, non-tender  Skin: No skin lesions  Extremities: Warm, trace BLE edema  Neurology: Non-focal, alert and oriented times three.   Psych: Affect normal    Labs:    ( 23 @ 00:28 )               9.4    12.70 )--------( 97                   30.0     ( 23 @ 00:27 )     134  |  95  |  76  ---------------------<  162  4.5  |  22  |  3.71    Ca 8.6  Phos 6.3  Mg 2.5    ( 23 @ 00:27 )  TPro  6.9  /  Alb  3.3  /  TBili  0.7  /  DBili  x   /  AST  24  /  ALT  15  /  AlkPhos  72  ( 23 @ 08:36 )  TPro  7.0  /  Alb  3.6  /  TBili  0.8  /  DBili  x   /  AST  26  /  ALT  17  /  AlkPhos  71    ( 22 @ 10:55 )  TropHS 1085  / CK 27    / CKMB x        ABG - ( 23 @ 23:53 )  pH: 7.43  / pCO2: 37    / pO2: 89    / HCO3: 25    / Base Excess: 0.4   / SaO2: 98.1    ADVANCED HEART FAILURE & TRANSPLANT  - PROGRESS NOTE  *To reach the NS1 Team from 8am to 5pm, please call [phone].  _______________________________________________________________________________________________________    Subjective:  - NAEO  - tolerating daily HD/UF    Medications:  acetaminophen    Suspension .. 650 milliGRAM(s) Oral every 6 hours PRN  aMIOdarone    Tablet 200 milliGRAM(s) Oral daily  aspirin  chewable 81 milliGRAM(s) Oral daily  atorvastatin 80 milliGRAM(s) Oral at bedtime  caspofungin IVPB 50 milliGRAM(s) IV Intermittent every 24 hours  caspofungin IVPB      chlorhexidine 0.12% Liquid 15 milliLiter(s) Oral Mucosa every 12 hours  chlorhexidine 2% Cloths 1 Application(s) Topical <User Schedule>  dextrose 5%. 1000 milliLiter(s) IV Continuous <Continuous>  dextrose 50% Injectable 50 milliLiter(s) IV Push every 15 minutes  dextrose Oral Gel 15 Gram(s) Oral once PRN  epoetin teena-epbx (RETACRIT) Injectable 5000 Unit(s) IV Push <User Schedule>  glucagon  Injectable 1 milliGRAM(s) IntraMuscular once  heparin   Injectable 5000 Unit(s) SubCutaneous every 8 hours  insulin lispro (ADMELOG) corrective regimen sliding scale   SubCutaneous every 4 hours  insulin NPH human recombinant 7 Unit(s) SubCutaneous every 6 hours  lactulose Syrup 10 Gram(s) Oral every 8 hours  midodrine 20 milliGRAM(s) Oral every 8 hours  multivitamin/minerals/iron Oral Solution (CENTRUM) 15 milliLiter(s) Oral daily  pantoprazole  Injectable 40 milliGRAM(s) IV Push daily  phenylephrine    Infusion 0.144 MICROgram(s)/kG/Min IV Continuous <Continuous>  polyethylene glycol 3350 17 Gram(s) Oral daily  senna 2 Tablet(s) Oral at bedtime  sevelamer carbonate Powder 800 milliGRAM(s) Enteral Tube two times a day  simethicone 80 milliGRAM(s) Chew daily PRN  sodium chloride 0.9% lock flush 10 milliLiter(s) IV Push every 1 hour PRN  thiamine 100 milliGRAM(s) Enteral Tube daily      ICU Vital Signs Last 24 Hrs  T(C): 36.9, Max: 36.9 ( @ 19:00)  HR: 85 (78 - 96)  BP: 105/58 (105/58 - 105/58)  BP(mean): 77 (77 - 77)  ABP: 121/46 (39/26 - 158/64)  ABP(mean): 69 (29 - 93)  RR: 23 (12 - 28)  SpO2: 100% (95% - 100%)    Weight in k.8 (23)  Weight in k.8 (23)      I&O's Summary Last 24 Hrs    IN: 2308.5 mL / OUT: 2520 mL / NET: -211.5 mL      Tele: SR 70-90s    Physical Exam:    General: No distress. Comfortable.  HEENT: EOM intact.  Neck: JVP 14-16 cm H2O  Respiratory: Clear to auscultation bilaterally  CV: RRR. Normal S1 and S2. No murmurs, rub, or gallops. Radial pulses normal.  Abdomen: Soft, non-distended, non-tender  Skin: No skin lesions  Extremities: Warm, trace BLE edema  Neurology: Non-focal, alert and oriented times three.   Psych: Affect normal    Labs:    ( 23 @ 00:28 )               9.4    12.70 )--------( 97                   30.0     ( 23 @ 00:27 )     134  |  95  |  76  ---------------------<  162  4.5  |  22  |  3.71    Ca 8.6  Phos 6.3  Mg 2.5    ( 23 @ 00:27 )  TPro  6.9  /  Alb  3.3  /  TBili  0.7  /  DBili  x   /  AST  24  /  ALT  15  /  AlkPhos  72  ( 23 @ 08:36 )  TPro  7.0  /  Alb  3.6  /  TBili  0.8  /  DBili  x   /  AST  26  /  ALT  17  /  AlkPhos  71    ( 22 @ 10:55 )  TropHS 1085  / CK 27    / CKMB x        ABG - ( 23 @ 23:53 )  pH: 7.43  / pCO2: 37    / pO2: 89    / HCO3: 25    / Base Excess: 0.4   / SaO2: 98.1    ADVANCED HEART FAILURE & TRANSPLANT  - PROGRESS NOTE  *To reach the NS1 Team from 8am to 5pm, please call [phone].  _______________________________________________________________________________________________________    Subjective:  - NAEO  - tolerating daily HD/UF    Medications:  acetaminophen    Suspension .. 650 milliGRAM(s) Oral every 6 hours PRN  aMIOdarone    Tablet 200 milliGRAM(s) Oral daily  aspirin  chewable 81 milliGRAM(s) Oral daily  atorvastatin 80 milliGRAM(s) Oral at bedtime  caspofungin IVPB 50 milliGRAM(s) IV Intermittent every 24 hours  caspofungin IVPB      chlorhexidine 0.12% Liquid 15 milliLiter(s) Oral Mucosa every 12 hours  chlorhexidine 2% Cloths 1 Application(s) Topical <User Schedule>  dextrose 5%. 1000 milliLiter(s) IV Continuous <Continuous>  dextrose 50% Injectable 50 milliLiter(s) IV Push every 15 minutes  dextrose Oral Gel 15 Gram(s) Oral once PRN  epoetin teena-epbx (RETACRIT) Injectable 5000 Unit(s) IV Push <User Schedule>  glucagon  Injectable 1 milliGRAM(s) IntraMuscular once  heparin   Injectable 5000 Unit(s) SubCutaneous every 8 hours  insulin lispro (ADMELOG) corrective regimen sliding scale   SubCutaneous every 4 hours  insulin NPH human recombinant 7 Unit(s) SubCutaneous every 6 hours  lactulose Syrup 10 Gram(s) Oral every 8 hours  midodrine 20 milliGRAM(s) Oral every 8 hours  multivitamin/minerals/iron Oral Solution (CENTRUM) 15 milliLiter(s) Oral daily  pantoprazole  Injectable 40 milliGRAM(s) IV Push daily  phenylephrine    Infusion 0.144 MICROgram(s)/kG/Min IV Continuous <Continuous>  polyethylene glycol 3350 17 Gram(s) Oral daily  senna 2 Tablet(s) Oral at bedtime  sevelamer carbonate Powder 800 milliGRAM(s) Enteral Tube two times a day  simethicone 80 milliGRAM(s) Chew daily PRN  sodium chloride 0.9% lock flush 10 milliLiter(s) IV Push every 1 hour PRN  thiamine 100 milliGRAM(s) Enteral Tube daily      ICU Vital Signs Last 24 Hrs  T(C): 36.9, Max: 36.9 ( @ 19:00)  HR: 85 (78 - 96)  BP: 105/58 (105/58 - 105/58)  BP(mean): 77 (77 - 77)  ABP: 121/46 (39/26 - 158/64)  ABP(mean): 69 (29 - 93)  RR: 23 (12 - 28)  SpO2: 100% (95% - 100%)    Weight in k.8 (23)  Weight in k.8 (23)      I&O's Summary Last 24 Hrs    IN: 2308.5 mL / OUT: 2520 mL / NET: -211.5 mL      Tele: SR 70-90s    Physical Exam:    General: No distress. Comfortable.  HEENT: EOM intact.  Neck: JVP 14-16 cm H2O  Respiratory: Clear to auscultation bilaterally  CV: RRR. Normal S1 and S2. No murmurs, rub, or gallops. Radial pulses normal.  Abdomen: Soft, non-distended, non-tender  Skin: No skin lesions  Extremities: Warm, trace BLE edema  Neurology: Non-focal, alert and oriented times three.   Psych: Affect normal    Labs:    ( 23 @ 00:28 )               9.4    12.70 )--------( 97                   30.0     ( 23 @ 00:27 )     134  |  95  |  76  ---------------------<  162  4.5  |  22  |  3.71    Ca 8.6  Phos 6.3  Mg 2.5    ( 23 @ 00:27 )  TPro  6.9  /  Alb  3.3  /  TBili  0.7  /  DBili  x   /  AST  24  /  ALT  15  /  AlkPhos  72  ( 23 @ 08:36 )  TPro  7.0  /  Alb  3.6  /  TBili  0.8  /  DBili  x   /  AST  26  /  ALT  17  /  AlkPhos  71    ( 22 @ 10:55 )  TropHS 1085  / CK 27    / CKMB x        ABG - ( 23 @ 23:53 )  pH: 7.43  / pCO2: 37    / pO2: 89    / HCO3: 25    / Base Excess: 0.4   / SaO2: 98.1    ADVANCED HEART FAILURE & TRANSPLANT  - PROGRESS NOTE  *To reach the NS1 Team from 8am to 5pm, please call [phone].  _______________________________________________________________________________________________________    Subjective:  - NAEO  - tolerating daily HD/UF    Medications:  acetaminophen    Suspension .. 650 milliGRAM(s) Oral every 6 hours PRN  aMIOdarone    Tablet 200 milliGRAM(s) Oral daily  aspirin  chewable 81 milliGRAM(s) Oral daily  atorvastatin 80 milliGRAM(s) Oral at bedtime  caspofungin IVPB 50 milliGRAM(s) IV Intermittent every 24 hours  caspofungin IVPB      chlorhexidine 0.12% Liquid 15 milliLiter(s) Oral Mucosa every 12 hours  chlorhexidine 2% Cloths 1 Application(s) Topical <User Schedule>  dextrose 5%. 1000 milliLiter(s) IV Continuous <Continuous>  dextrose 50% Injectable 50 milliLiter(s) IV Push every 15 minutes  dextrose Oral Gel 15 Gram(s) Oral once PRN  epoetin teena-epbx (RETACRIT) Injectable 5000 Unit(s) IV Push <User Schedule>  glucagon  Injectable 1 milliGRAM(s) IntraMuscular once  heparin   Injectable 5000 Unit(s) SubCutaneous every 8 hours  insulin lispro (ADMELOG) corrective regimen sliding scale   SubCutaneous every 4 hours  insulin NPH human recombinant 7 Unit(s) SubCutaneous every 6 hours  lactulose Syrup 10 Gram(s) Oral every 8 hours  midodrine 20 milliGRAM(s) Oral every 8 hours  multivitamin/minerals/iron Oral Solution (CENTRUM) 15 milliLiter(s) Oral daily  pantoprazole  Injectable 40 milliGRAM(s) IV Push daily  phenylephrine    Infusion 0.144 MICROgram(s)/kG/Min IV Continuous <Continuous>  polyethylene glycol 3350 17 Gram(s) Oral daily  senna 2 Tablet(s) Oral at bedtime  sevelamer carbonate Powder 800 milliGRAM(s) Enteral Tube two times a day  simethicone 80 milliGRAM(s) Chew daily PRN  sodium chloride 0.9% lock flush 10 milliLiter(s) IV Push every 1 hour PRN  thiamine 100 milliGRAM(s) Enteral Tube daily      ICU Vital Signs Last 24 Hrs  T(C): 36.9, Max: 36.9 ( @ 19:00)  HR: 85 (78 - 96)  BP: 105/58 (105/58 - 105/58)  BP(mean): 77 (77 - 77)  ABP: 121/46 (39/26 - 158/64)  ABP(mean): 69 (29 - 93)  RR: 23 (12 - 28)  SpO2: 100% (95% - 100%)    Weight in k.8 (23)  Weight in k.8 (23)      I&O's Summary Last 24 Hrs    IN: 2308.5 mL / OUT: 2520 mL / NET: -211.5 mL      Tele: SR 70-90s    Physical Exam:    General: No distress. Comfortable.  HEENT: EOM intact.  Neck: JVP 14-16 cm H2O  Respiratory: Clear to auscultation bilaterally  CV: RRR. Normal S1 and S2. No murmurs, rub, or gallops. Radial pulses normal.  Abdomen: Soft, non-distended, non-tender  Skin: No skin lesions  Extremities: Warm, trace BLE edema  Neurology: Non-focal, alert and oriented times three.   Psych: Affect normal    Labs:    ( 23 @ 00:28 )               9.4    12.70 )--------( 97                   30.0     ( 23 @ 00:27 )     134  |  95  |  76  ---------------------<  162  4.5  |  22  |  3.71    Ca 8.6  Phos 6.3  Mg 2.5    ( 23 @ 00:27 )  TPro  6.9  /  Alb  3.3  /  TBili  0.7  /  DBili  x   /  AST  24  /  ALT  15  /  AlkPhos  72  ( 23 @ 08:36 )  TPro  7.0  /  Alb  3.6  /  TBili  0.8  /  DBili  x   /  AST  26  /  ALT  17  /  AlkPhos  71    ( 22 @ 10:55 )  Trop[facility] 1085  / CK 27    / CKMB x        ABG - ( 23 @ 23:53 )  pH: 7.43  / pCO2: 37    / pO2: 89    / HCO3: 25    / Base Excess: 0.4   / SaO2: 98.1    ADVANCED HEART FAILURE & TRANSPLANT  - PROGRESS NOTE  *To reach the NS1 Team from 8am to 5pm, please call [phone].  _______________________________________________________________________________________________________    Subjective:  - NAEO  - tolerating daily HD/UF    Medications:  acetaminophen    Suspension .. 650 milliGRAM(s) Oral every 6 hours PRN  aMIOdarone    Tablet 200 milliGRAM(s) Oral daily  aspirin  chewable 81 milliGRAM(s) Oral daily  atorvastatin 80 milliGRAM(s) Oral at bedtime  caspofungin IVPB 50 milliGRAM(s) IV Intermittent every 24 hours  caspofungin IVPB      chlorhexidine 0.12% Liquid 15 milliLiter(s) Oral Mucosa every 12 hours  chlorhexidine 2% Cloths 1 Application(s) Topical <User Schedule>  dextrose 5%. 1000 milliLiter(s) IV Continuous <Continuous>  dextrose 50% Injectable 50 milliLiter(s) IV Push every 15 minutes  dextrose Oral Gel 15 Gram(s) Oral once PRN  epoetin teena-epbx (RETACRIT) Injectable 5000 Unit(s) IV Push <User Schedule>  glucagon  Injectable 1 milliGRAM(s) IntraMuscular once  heparin   Injectable 5000 Unit(s) SubCutaneous every 8 hours  insulin lispro (ADMELOG) corrective regimen sliding scale   SubCutaneous every 4 hours  insulin NPH human recombinant 7 Unit(s) SubCutaneous every 6 hours  lactulose Syrup 10 Gram(s) Oral every 8 hours  midodrine 20 milliGRAM(s) Oral every 8 hours  multivitamin/minerals/iron Oral Solution (CENTRUM) 15 milliLiter(s) Oral daily  pantoprazole  Injectable 40 milliGRAM(s) IV Push daily  phenylephrine    Infusion 0.144 MICROgram(s)/kG/Min IV Continuous <Continuous>  polyethylene glycol 3350 17 Gram(s) Oral daily  senna 2 Tablet(s) Oral at bedtime  sevelamer carbonate Powder 800 milliGRAM(s) Enteral Tube two times a day  simethicone 80 milliGRAM(s) Chew daily PRN  sodium chloride 0.9% lock flush 10 milliLiter(s) IV Push every 1 hour PRN  thiamine 100 milliGRAM(s) Enteral Tube daily      ICU Vital Signs Last 24 Hrs  T(C): 36.9, Max: 36.9 ( @ 19:00)  HR: 85 (78 - 96)  BP: 105/58 (105/58 - 105/58)  BP(mean): 77 (77 - 77)  ABP: 121/46 (39/26 - 158/64)  ABP(mean): 69 (29 - 93)  RR: 23 (12 - 28)  SpO2: 100% (95% - 100%)    Weight in k.8 (23)  Weight in k.8 (23)      I&O's Summary Last 24 Hrs    IN: 2308.5 mL / OUT: 2520 mL / NET: -211.5 mL      Tele: SR 70-90s    Physical Exam:    General: No distress. Comfortable.  HEENT: EOM intact.  Neck: JVP 14-16 cm H2O  Respiratory: Clear to auscultation bilaterally  CV: RRR. Normal S1 and S2. No murmurs, rub, or gallops. Radial pulses normal.  Abdomen: Soft, non-distended, non-tender  Skin: No skin lesions  Extremities: Warm, trace BLE edema  Neurology: Non-focal, alert and oriented times three.   Psych: Affect normal    Labs:    ( 23 @ 00:28 )               9.4    12.70 )--------( 97                   30.0     ( 23 @ 00:27 )     134  |  95  |  76  ---------------------<  162  4.5  |  22  |  3.71    Ca 8.6  Phos 6.3  Mg 2.5    ( 23 @ 00:27 )  TPro  6.9  /  Alb  3.3  /  TBili  0.7  /  DBili  x   /  AST  24  /  ALT  15  /  AlkPhos  72  ( 23 @ 08:36 )  TPro  7.0  /  Alb  3.6  /  TBili  0.8  /  DBili  x   /  AST  26  /  ALT  17  /  AlkPhos  71    ( 22 @ 10:55 )  TropHS 1085  / CK 27    / CKMB x        ABG - ( 23 @ 23:53 )  pH: 7.43  / pCO2: 37    / pO2: 89    / HCO3: 25    / Base Excess: 0.4   / SaO2: 98.1

## 2023-01-04 NOTE — PROGRESS NOTE ADULT - SUBJECTIVE AND OBJECTIVE BOX
[facility] Division of Kidney Diseases & Hypertension  FOLLOW UP NOTE  [phone]--------------------------------------------------------------------------------  Chief Complaint:Acute pancreatitis without infection or necrosis        HPI: 63 y/o male with PMH of CABG, DM, HTN, HLD presenting as transfer from [place] for c/f STEMI. Course c/b gallstone pancreatitis leading to ARDS and shock & cardiac arrest now on [facility] ECMO. Had significant troponin elevation and his LVEF down to 8%. Pt was deemed to be too unstable to have an angiogram and potential PCI due to his co-morbidities & a new subdural bleed. Pt being seen for ERIC. SCr on arrival was 2.15; trended down to hector 1.6 on 11/25 and eventually increased to 3.95 11/28. Pt received IV diuretics as per CTU. Pt initiated on CRRT on 12/5/22 to optimize volume status and electrolytes.  Pt underwent decannulation of ECMO on 12/8/22. Pt was restarted on 12/9/22 for volume overload. Pt underwent mitral clip OR (12/16/22). s/p trach on 12/16         24 hour events/subjective: Patient seen & examined. Labs & vitals reviewed. Required vent support last night for 2 hrs. Remains on TC with FiO2 40% this AM. Last UF session yesterday with 2L UF. Last HD on 1/2. Required Donny earlier but now off it. CXR with b/l pulm vasc congestion. UO in the last 24 hours 500cc. Net neg 200cc in 24 hrs             PAST HISTORY  --------------------------------------------------------------------------------  No significant changes to PMH, PSH, FHx, SHx, unless otherwise noted    ALLERGIES & MEDICATIONS  --------------------------------------------------------------------------------  Allergies    No Known Allergies    Intolerances      Standing Inpatient Medications  aMIOdarone    Tablet 200 milliGRAM(s) Oral daily  aspirin  chewable 81 milliGRAM(s) Oral daily  atorvastatin 80 milliGRAM(s) Oral at bedtime  caspofungin IVPB 50 milliGRAM(s) IV Intermittent every 24 hours  caspofungin IVPB      chlorhexidine 0.12% Liquid 15 milliLiter(s) Oral Mucosa every 12 hours  chlorhexidine 2% Cloths 1 Application(s) Topical <User Schedule>  dextrose 5%. 1000 milliLiter(s) IV Continuous <Continuous>  dextrose 50% Injectable 50 milliLiter(s) IV Push every 15 minutes  epoetin teena-epbx (RETACRIT) Injectable 5000 Unit(s) IV Push <User Schedule>  glucagon  Injectable 1 milliGRAM(s) IntraMuscular once  heparin   Injectable 5000 Unit(s) SubCutaneous every 8 hours  insulin lispro (ADMELOG) corrective regimen sliding scale   SubCutaneous every 4 hours  insulin NPH human recombinant 7 Unit(s) SubCutaneous every 6 hours  midodrine 20 milliGRAM(s) Oral every 8 hours  multivitamin/minerals/iron Oral Solution (CENTRUM) 15 milliLiter(s) Oral daily  pantoprazole  Injectable 40 milliGRAM(s) IV Push daily  phenylephrine    Infusion 0.144 MICROgram(s)/kG/Min IV Continuous <Continuous>  polyethylene glycol 3350 17 Gram(s) Oral daily  senna 2 Tablet(s) Oral at bedtime  sevelamer carbonate Powder 800 milliGRAM(s) Enteral Tube two times a day  thiamine 100 milliGRAM(s) Enteral Tube daily    PRN Inpatient Medications  acetaminophen    Suspension .. 650 milliGRAM(s) Oral every 6 hours PRN  dextrose Oral Gel 15 Gram(s) Oral once PRN  simethicone 80 milliGRAM(s) Chew daily PRN  sodium chloride 0.9% lock flush 10 milliLiter(s) IV Push every 1 hour PRN      REVIEW OF SYSTEMS  --------------------------------------------------------------------------------  Gen: No chills  Respiratory: No dyspnea, cough  CV: No chest pain  GI: No abdominal pain, diarrhea,  nausea, vomiting  : No dysuria, hematuria  MSK:  no edema  Neuro: No dizziness/lightheadedness      All other systems were reviewed and are negative, except as noted.    VITALS/PHYSICAL EXAM  --------------------------------------------------------------------------------  T(C): 36.8 (01-04-23 @ 08:00), Max: 37.3 (01-03-23 @ 12:00)  HR: 89 (01-04-23 @ 09:00) (78 - 108)  BP: 105/58 (01-03-23 @ 23:45) (105/58 - 105/58)  RR: 24 (01-04-23 @ 09:00) (12 - 32)  SpO2: 100% (01-04-23 @ 09:00) (95% - 100%)  Wt(kg): --        01-03-23 @ 07:01  -  01-04-23 @ 07:00  --------------------------------------------------------  IN: 2308.5 mL / OUT: 2520 mL / NET: -211.5 mL    01-04-23 @ 07:01  -  01-04-23 @ 10:06  --------------------------------------------------------  IN: 130 mL / OUT: 55 mL / NET: 75 mL      Physical Exam:  	Gen: NAD  	HEENT: Anicteric  	Pulm: TC with 40% FiO2  	CV: S1S2+  	Abd: Soft, +BS      	Ext: trace LE edema B/L  	Neuro: Awake  	Skin: Warm and dry  	Vascular access: RIJ non tunneled catheter        LABS/STUDIES  --------------------------------------------------------------------------------              9.4    12.70 >-----------<  97       [01-04-23 @ 00:28]              30.0     134  |  95  |  76  ----------------------------<  162      [01-04-23 @ 00:27]  4.5   |  22  |  3.71        Ca     8.6     [01-04-23 @ 00:27]      Mg     2.5     [01-04-23 @ 00:27]      Phos  6.3     [01-04-23 @ 00:27]    TPro  6.9  /  Alb  3.3  /  TBili  0.7  /  DBili  x   /  AST  24  /  ALT  15  /  AlkPhos  72  [01-04-23 @ 00:27]          Creatinine Trend:  SCr 3.71 [01-04 @ 00:27]  SCr 3.27 [01-03 @ 08:36]  SCr 2.98 [01-03 @ 00:07]  SCr 5.14 [01-02 @ 01:24]  SCr 4.56 [01-01 @ 00:40]        Iron 39, TIBC 222, %sat 18      [12-31-22 @ 06:41]  Ferritin 346      [12-31-22 @ 06:42]       [facility] Division of Kidney Diseases & Hypertension  FOLLOW UP NOTE  [phone]--------------------------------------------------------------------------------  Chief Complaint:Acute pancreatitis without infection or necrosis        HPI: 63 y/o male with PMH of CABG, DM, HTN, HLD presenting as transfer from [place] for c/f STEMI. Course c/b gallstone pancreatitis leading to ARDS and shock & cardiac arrest now on [facility] ECM[facility]. Had significant troponin elevation and his LVEF down to 8%. Pt was deemed to be too unstable to have an angiogram and potential PCI due to his co-morbidities & a new subdural bleed. Pt being seen for ERIC. SCr on arrival was 2.15; trended down to hector 1.6 on 11/25 and eventually increased to 3.95 11/28. Pt received IV diuretics as per CTU. Pt initiated on CRRT on 12/5/22 to optimize volume status and electrolytes.  Pt underwent decannulation of ECMO on 12/8/22. Pt was restarted on 12/9/22 for volume overload. Pt underwent mitral clip OR (12/16/22). s/p trach on 12/16         24 hour events/subjective: Patient seen & examined. Labs & vitals reviewed. Required vent support last night for 2 hrs. Remains on TC with FiO2 40% this AM. Last UF session yesterday with 2L UF. Last HD on 1/2. Required Donny earlier but now off it. CXR with b/l pulm vasc congestion. UO in the last 24 hours 500cc. Net neg 200cc in 24 hrs             PAST HISTORY  --------------------------------------------------------------------------------  No significant changes to PMH, PSH, FHx, SHx, unless otherwise noted    ALLERGIES & MEDICATIONS  --------------------------------------------------------------------------------  Allergies    No Known Allergies    Intolerances      Standing Inpatient Medications  aMIOdarone    Tablet 200 milliGRAM(s) Oral daily  aspirin  chewable 81 milliGRAM(s) Oral daily  atorvastatin 80 milliGRAM(s) Oral at bedtime  caspofungin IVPB 50 milliGRAM(s) IV Intermittent every 24 hours  caspofungin IVPB      chlorhexidine 0.12% Liquid 15 milliLiter(s) Oral Mucosa every 12 hours  chlorhexidine 2% Cloths 1 Application(s) Topical <User Schedule>  dextrose 5%. 1000 milliLiter(s) IV Continuous <Continuous>  dextrose 50% Injectable 50 milliLiter(s) IV Push every 15 minutes  epoetin teena-epbx (RETACRIT) Injectable 5000 Unit(s) IV Push <User Schedule>  glucagon  Injectable 1 milliGRAM(s) IntraMuscular once  heparin   Injectable 5000 Unit(s) SubCutaneous every 8 hours  insulin lispro (ADMELOG) corrective regimen sliding scale   SubCutaneous every 4 hours  insulin NPH human recombinant 7 Unit(s) SubCutaneous every 6 hours  midodrine 20 milliGRAM(s) Oral every 8 hours  multivitamin/minerals/iron Oral Solution (CENTRUM) 15 milliLiter(s) Oral daily  pantoprazole  Injectable 40 milliGRAM(s) IV Push daily  phenylephrine    Infusion 0.144 MICROgram(s)/kG/Min IV Continuous <Continuous>  polyethylene glycol 3350 17 Gram(s) Oral daily  senna 2 Tablet(s) Oral at bedtime  sevelamer carbonate Powder 800 milliGRAM(s) Enteral Tube two times a day  thiamine 100 milliGRAM(s) Enteral Tube daily    PRN Inpatient Medications  acetaminophen    Suspension .. 650 milliGRAM(s) Oral every 6 hours PRN  dextrose Oral Gel 15 Gram(s) Oral once PRN  simethicone 80 milliGRAM(s) Chew daily PRN  sodium chloride 0.9% lock flush 10 milliLiter(s) IV Push every 1 hour PRN      REVIEW OF SYSTEMS  --------------------------------------------------------------------------------  Gen: No chills  Respiratory: No dyspnea, cough  CV: No chest pain  GI: No abdominal pain, diarrhea,  nausea, vomiting  : No dysuria, hematuria  MSK:  no edema  Neuro: No dizziness/lightheadedness      All other systems were reviewed and are negative, except as noted.    VITALS/PHYSICAL EXAM  --------------------------------------------------------------------------------  T(C): 36.8 (01-04-23 @ 08:00), Max: 37.3 (01-03-23 @ 12:00)  HR: 89 (01-04-23 @ 09:00) (78 - 108)  BP: 105/58 (01-03-23 @ 23:45) (105/58 - 105/58)  RR: 24 (01-04-23 @ 09:00) (12 - 32)  SpO2: 100% (01-04-23 @ 09:00) (95% - 100%)  Wt(kg): --        01-03-23 @ 07:01  -  01-04-23 @ 07:00  --------------------------------------------------------  IN: 2308.5 mL / OUT: 2520 mL / NET: -211.5 mL    01-04-23 @ 07:01  -  01-04-23 @ 10:06  --------------------------------------------------------  IN: 130 mL / OUT: 55 mL / NET: 75 mL      Physical Exam:  	Gen: NAD  	HEENT: Anicteric  	Pulm: TC with 40% FiO2  	CV: S1S2+  	Abd: Soft, +BS      	Ext: trace LE edema B/L  	Neuro: Awake  	Skin: Warm and dry  	Vascular access: RIJ non tunneled catheter        LABS/STUDIES  --------------------------------------------------------------------------------              9.4    12.70 >-----------<  97       [01-04-23 @ 00:28]              30.0     134  |  95  |  76  ----------------------------<  162      [01-04-23 @ 00:27]  4.5   |  22  |  3.71        Ca     8.6     [01-04-23 @ 00:27]      Mg     2.5     [01-04-23 @ 00:27]      Phos  6.3     [01-04-23 @ 00:27]    TPro  6.9  /  Alb  3.3  /  TBili  0.7  /  DBili  x   /  AST  24  /  ALT  15  /  AlkPhos  72  [01-04-23 @ 00:27]          Creatinine Trend:  SCr 3.71 [01-04 @ 00:27]  SCr 3.27 [01-03 @ 08:36]  SCr 2.98 [01-03 @ 00:07]  SCr 5.14 [01-02 @ 01:24]  SCr 4.56 [01-01 @ 00:40]        Iron 39, TIBC 222, %sat 18      [12-31-22 @ 06:41]  Ferritin 346      [12-31-22 @ 06:42]       [facility] Division of Kidney Diseases & Hypertension  FOLLOW UP NOTE  [phone]--------------------------------------------------------------------------------  Chief Complaint:Acute pancreatitis without infection or necrosis        HPI: 61 y/o male with PMH of CABG, DM, HTN, HLD presenting as transfer from [place] for c/f STEMI. Course c/b gallstone pancreatitis leading to ARDS and shock & cardiac arrest now on [facility]. Had significant troponin elevation and his LVEF down to 8%. Pt was deemed to be too unstable to have an angiogram and potential PCI due to his co-morbidities & a new subdural bleed. Pt being seen for ERIC. SCr on arrival was 2.15; trended down to hector 1.6 on 11/25 and eventually increased to 3.95 11/28. Pt received IV diuretics as per CTU. Pt initiated on CRRT on 12/5/22 to optimize volume status and electrolytes.  Pt underwent decannulation of ECMO on 12/8/22. Pt was restarted on 12/9/22 for volume overload. Pt underwent mitral clip OR (12/16/22). s/p trach on 12/16         24 hour events/subjective: Patient seen & examined. Labs & vitals reviewed. Required vent support last night for 2 hrs. Remains on TC with FiO2 40% this AM. Last UF session yesterday with 2L UF. Last HD on 1/2. Required Donny earlier but now off it. CXR with b/l pulm vasc congestion. UO in the last 24 hours 500cc. Net neg 200cc in 24 hrs             PAST HISTORY  --------------------------------------------------------------------------------  No significant changes to PMH, PSH, FHx, SHx, unless otherwise noted    ALLERGIES & MEDICATIONS  --------------------------------------------------------------------------------  Allergies    No Known Allergies    Intolerances      Standing Inpatient Medications  aMIOdarone    Tablet 200 milliGRAM(s) Oral daily  aspirin  chewable 81 milliGRAM(s) Oral daily  atorvastatin 80 milliGRAM(s) Oral at bedtime  caspofungin IVPB 50 milliGRAM(s) IV Intermittent every 24 hours  caspofungin IVPB      chlorhexidine 0.12% Liquid 15 milliLiter(s) Oral Mucosa every 12 hours  chlorhexidine 2% Cloths 1 Application(s) Topical <User Schedule>  dextrose 5%. 1000 milliLiter(s) IV Continuous <Continuous>  dextrose 50% Injectable 50 milliLiter(s) IV Push every 15 minutes  epoetin teena-epbx (RETACRIT) Injectable 5000 Unit(s) IV Push <User Schedule>  glucagon  Injectable 1 milliGRAM(s) IntraMuscular once  heparin   Injectable 5000 Unit(s) SubCutaneous every 8 hours  insulin lispro (ADMELOG) corrective regimen sliding scale   SubCutaneous every 4 hours  insulin NPH human recombinant 7 Unit(s) SubCutaneous every 6 hours  midodrine 20 milliGRAM(s) Oral every 8 hours  multivitamin/minerals/iron Oral Solution (CENTRUM) 15 milliLiter(s) Oral daily  pantoprazole  Injectable 40 milliGRAM(s) IV Push daily  phenylephrine    Infusion 0.144 MICROgram(s)/kG/Min IV Continuous <Continuous>  polyethylene glycol 3350 17 Gram(s) Oral daily  senna 2 Tablet(s) Oral at bedtime  sevelamer carbonate Powder 800 milliGRAM(s) Enteral Tube two times a day  thiamine 100 milliGRAM(s) Enteral Tube daily    PRN Inpatient Medications  acetaminophen    Suspension .. 650 milliGRAM(s) Oral every 6 hours PRN  dextrose Oral Gel 15 Gram(s) Oral once PRN  simethicone 80 milliGRAM(s) Chew daily PRN  sodium chloride 0.9% lock flush 10 milliLiter(s) IV Push every 1 hour PRN      REVIEW OF SYSTEMS  --------------------------------------------------------------------------------  Gen: No chills  Respiratory: No dyspnea, cough  CV: No chest pain  GI: No abdominal pain, diarrhea,  nausea, vomiting  : No dysuria, hematuria  MSK:  no edema  Neuro: No dizziness/lightheadedness      All other systems were reviewed and are negative, except as noted.    VITALS/PHYSICAL EXAM  --------------------------------------------------------------------------------  T(C): 36.8 (01-04-23 @ 08:00), Max: 37.3 (01-03-23 @ 12:00)  HR: 89 (01-04-23 @ 09:00) (78 - 108)  BP: 105/58 (01-03-23 @ 23:45) (105/58 - 105/58)  RR: 24 (01-04-23 @ 09:00) (12 - 32)  SpO2: 100% (01-04-23 @ 09:00) (95% - 100%)  Wt(kg): --        01-03-23 @ 07:01  -  01-04-23 @ 07:00  --------------------------------------------------------  IN: 2308.5 mL / OUT: 2520 mL / NET: -211.5 mL    01-04-23 @ 07:01  -  01-04-23 @ 10:06  --------------------------------------------------------  IN: 130 mL / OUT: 55 mL / NET: 75 mL      Physical Exam:  	Gen: NAD  	HEENT: Anicteric  	Pulm: TC with 40% FiO2  	CV: S1S2+  	Abd: Soft, +BS      	Ext: trace LE edema B/L  	Neuro: Awake  	Skin: Warm and dry  	Vascular access: RIJ non tunneled catheter        LABS/STUDIES  --------------------------------------------------------------------------------              9.4    12.70 >-----------<  97       [01-04-23 @ 00:28]              30.0     134  |  95  |  76  ----------------------------<  162      [01-04-23 @ 00:27]  4.5   |  22  |  3.71        Ca     8.6     [01-04-23 @ 00:27]      Mg     2.5     [01-04-23 @ 00:27]      Phos  6.3     [01-04-23 @ 00:27]    TPro  6.9  /  Alb  3.3  /  TBili  0.7  /  DBili  x   /  AST  24  /  ALT  15  /  AlkPhos  72  [01-04-23 @ 00:27]          Creatinine Trend:  SCr 3.71 [01-04 @ 00:27]  SCr 3.27 [01-03 @ 08:36]  SCr 2.98 [01-03 @ 00:07]  SCr 5.14 [01-02 @ 01:24]  SCr 4.56 [01-01 @ 00:40]        Iron 39, TIBC 222, %sat 18      [12-31-22 @ 06:41]  Ferritin 346      [12-31-22 @ 06:42]

## 2023-01-04 NOTE — PROGRESS NOTE ADULT - SUBJECTIVE AND OBJECTIVE BOX
infectious diseases progress note:    Patient is a 62y old  Male who presents with a chief complaint of Acute cholecystitis, gallstone pancreatitis (04 Jan 2023 08:19)        Acute pancreatitis without infection or necrosis                   Allergies    No Known Allergies    Intolerances        ANTIBIOTICS/RELEVANT:  antimicrobials  caspofungin IVPB 50 milliGRAM(s) IV Intermittent every 24 hours  caspofungin IVPB        immunologic:  epoetin teena-epbx (RETACRIT) Injectable 5000 Unit(s) IV Push <User Schedule>    OTHER:  acetaminophen    Suspension .. 650 milliGRAM(s) Oral every 6 hours PRN  aMIOdarone    Tablet 200 milliGRAM(s) Oral daily  aspirin  chewable 81 milliGRAM(s) Oral daily  atorvastatin 80 milliGRAM(s) Oral at bedtime  chlorhexidine 0.12% Liquid 15 milliLiter(s) Oral Mucosa every 12 hours  chlorhexidine 2% Cloths 1 Application(s) Topical <User Schedule>  dextrose 5%. 1000 milliLiter(s) IV Continuous <Continuous>  dextrose 50% Injectable 50 milliLiter(s) IV Push every 15 minutes  dextrose Oral Gel 15 Gram(s) Oral once PRN  glucagon  Injectable 1 milliGRAM(s) IntraMuscular once  heparin   Injectable 5000 Unit(s) SubCutaneous every 8 hours  insulin lispro (ADMELOG) corrective regimen sliding scale   SubCutaneous every 4 hours  insulin NPH human recombinant 7 Unit(s) SubCutaneous every 6 hours  midodrine 20 milliGRAM(s) Oral every 8 hours  multivitamin/minerals/iron Oral Solution (CENTRUM) 15 milliLiter(s) Oral daily  pantoprazole  Injectable 40 milliGRAM(s) IV Push daily  phenylephrine    Infusion 0.144 MICROgram(s)/kG/Min IV Continuous <Continuous>  polyethylene glycol 3350 17 Gram(s) Oral daily  senna 2 Tablet(s) Oral at bedtime  sevelamer carbonate Powder 800 milliGRAM(s) Enteral Tube two times a day  simethicone 80 milliGRAM(s) Chew daily PRN  sodium chloride 0.9% lock flush 10 milliLiter(s) IV Push every 1 hour PRN  thiamine 100 milliGRAM(s) Enteral Tube daily      Objective:  Vital Signs Last 24 Hrs  T(C): 36.8 (04 Jan 2023 08:00), Max: 37.3 (03 Jan 2023 12:00)  T(F): 98.2 (04 Jan 2023 08:00), Max: 99.1 (03 Jan 2023 12:00)  HR: 83 (04 Jan 2023 08:00) (78 - 108)  BP: 105/58 (03 Jan 2023 23:45) (105/58 - 105/58)  BP(mean): 77 (03 Jan 2023 23:45) (77 - 77)  RR: 24 (04 Jan 2023 08:00) (12 - 32)  SpO2: 100% (04 Jan 2023 08:00) (95% - 100%)    Parameters below as of 04 Jan 2023 08:00  Patient On (Oxygen Delivery Method): tracheostomy collar    O2 Concentration (%): 40    PHYSICAL EXAM:     Eyes:INOCENCIO, EOMI  Ear/Nose/Throat: no oral lesion, no sinus tenderness on percussion	  Neck:no JVD, no lymphadenopathy, supple  Respiratory: CTA lore  Cardiovascular: S1S2 RRR, no murmurs  Gastrointestinal:soft, (+) BS, no HSM  Extremities:no e/e/c        LABS:                        9.4    12.70 )-----------( 97       ( 04 Jan 2023 00:28 )             30.0     01-04    134<L>  |  95<L>  |  76<H>  ----------------------------<  162<H>  4.5   |  22  |  3.71<H>    Ca    8.6      04 Jan 2023 00:27  Phos  6.3     01-04  Mg     2.5     01-04    TPro  6.9  /  Alb  3.3  /  TBili  0.7  /  DBili  x   /  AST  24  /  ALT  15  /  AlkPhos  72  01-04            MICROBIOLOGY:    RECENT CULTURES:  01-02 @ 01:01 .Blood Blood-Peripheral   PCR    Growth in anaerobic bottle: Gram Positive Cocci in Clusters    Blood Culture PCR  Blood Culture PCR     Growth in anaerobic bottle: Staphylococcus epidermidis  Coag Negative Staphylococcus  Single set isolate, possible contaminant. Contact  Microbiology if susceptibility testing clinically  indicated.  ***Blood Panel PCR results on this specimen are available  approximately 3 hours after the Gram stain result.***  Gram stain, PCR, and/or culture results may not always  correspond due to difference in methodologies.  ************************************************************  This PCR assay was performed by multiplex PCR. This  Assay tests for 66 bacterial and resistance gene targets.  Please refer to the [facility] Labs test directory  at [email or web address] for details.    01-01 @ 17:45 .Sputum Sputum       Few polymorphonuclear leukocytes per low power field  Few Squamous epithelial cells per low power field  Rare Gram positive cocci in pairs per oil power field           Normal Respiratory Christy present    12-30 @ 14:53 Clean Catch Clean Catch (Midstream)                <10,000 CFU/mL Normal Urogenital Christy    12-29 @ 00:56 .Blood Blood-Peripheral                No Growth Final          RESPIRATORY CULTURES:              RADIOLOGY & ADDITIONAL STUDIES:        Pager [phone]  After 5 pm/weekends or if no response :[phone] infectious diseases progress note:    Patient is a 62y old  Male who presents with a chief complaint of Acute cholecystitis, gallstone pancreatitis (04 Jan 2023 08:19)        Acute pancreatitis without infection or necrosis                   Allergies    No Known Allergies    Intolerances        ANTIBIOTICS/RELEVANT:  antimicrobials  caspofungin IVPB 50 milliGRAM(s) IV Intermittent every 24 hours  caspofungin IVPB        immunologic:  epoetin teena-epbx (RETACRIT) Injectable 5000 Unit(s) IV Push <User Schedule>    OTHER:  acetaminophen    Suspension .. 650 milliGRAM(s) Oral every 6 hours PRN  aMIOdarone    Tablet 200 milliGRAM(s) Oral daily  aspirin  chewable 81 milliGRAM(s) Oral daily  atorvastatin 80 milliGRAM(s) Oral at bedtime  chlorhexidine 0.12% Liquid 15 milliLiter(s) Oral Mucosa every 12 hours  chlorhexidine 2% Cloths 1 Application(s) Topical <User Schedule>  dextrose 5%. 1000 milliLiter(s) IV Continuous <Continuous>  dextrose 50% Injectable 50 milliLiter(s) IV Push every 15 minutes  dextrose Oral Gel 15 Gram(s) Oral once PRN  glucagon  Injectable 1 milliGRAM(s) IntraMuscular once  heparin   Injectable 5000 Unit(s) SubCutaneous every 8 hours  insulin lispro (ADMELOG) corrective regimen sliding scale   SubCutaneous every 4 hours  insulin NPH human recombinant 7 Unit(s) SubCutaneous every 6 hours  midodrine 20 milliGRAM(s) Oral every 8 hours  multivitamin/minerals/iron Oral Solution (CENTRUM) 15 milliLiter(s) Oral daily  pantoprazole  Injectable 40 milliGRAM(s) IV Push daily  phenylephrine    Infusion 0.144 MICROgram(s)/kG/Min IV Continuous <Continuous>  polyethylene glycol 3350 17 Gram(s) Oral daily  senna 2 Tablet(s) Oral at bedtime  sevelamer carbonate Powder 800 milliGRAM(s) Enteral Tube two times a day  simethicone 80 milliGRAM(s) Chew daily PRN  sodium chloride 0.9% lock flush 10 milliLiter(s) IV Push every 1 hour PRN  thiamine 100 milliGRAM(s) Enteral Tube daily      Objective:  Vital Signs Last 24 Hrs  T(C): 36.8 (04 Jan 2023 08:00), Max: 37.3 (03 Jan 2023 12:00)  T(F): 98.2 (04 Jan 2023 08:00), Max: 99.1 (03 Jan 2023 12:00)  HR: 83 (04 Jan 2023 08:00) (78 - 108)  BP: 105/58 (03 Jan 2023 23:45) (105/58 - 105/58)  BP(mean): 77 (03 Jan 2023 23:45) (77 - 77)  RR: 24 (04 Jan 2023 08:00) (12 - 32)  SpO2: 100% (04 Jan 2023 08:00) (95% - 100%)    Parameters below as of 04 Jan 2023 08:00  Patient On (Oxygen Delivery Method): tracheostomy collar    O2 Concentration (%): 40    PHYSICAL EXAM:     Eyes:INOCENCIO, EOMI  Ear/Nose/Throat: no oral lesion, no sinus tenderness on percussion	  Neck:no JVD, no lymphadenopathy, supple  Respiratory: CTA lore  Cardiovascular: S1S2 RRR, no murmurs  Gastrointestinal:soft, (+) BS, no HSM  Extremities:no e/e/c        LABS:                        9.4    12.70 )-----------( 97       ( 04 Jan 2023 00:28 )             30.0     01-04    134<L>  |  95<L>  |  76<H>  ----------------------------<  162<H>  4.5   |  22  |  3.71<H>    Ca    8.6      04 Jan 2023 00:27  Phos  6.3     01-04  Mg     2.5     01-04    TPro  6.9  /  Alb  3.3  /  TBili  0.7  /  DBili  x   /  AST  24  /  ALT  15  /  AlkPhos  72  01-04            MICROBIOLOGY:    RECENT CULTURES:  01-02 @ 01:01 .Blood Blood-Peripheral   PCR    Growth in anaerobic bottle: Gram Positive Cocci in Clusters    Blood Culture PCR  Blood Culture PCR     Growth in anaerobic bottle: Staphylococcus epidermidis  Coag Negative Staphylococcus  Single set isolate, possible contaminant. Contact  Microbiology if susceptibility testing clinically  indicated.  ***Blood Panel PCR results on this specimen are available  approximately 3 hours after the Gram stain result.***  Gram stain, PCR, and/or culture results may not always  correspond due to difference in methodologies.  ************************************************************  This PCR assay was performed by multiplex PCR. This  Assay tests for 66 bacterial and resistance gene targets.  Please refer to the [facility] test directory  at [email or web address] for details.    01-01 @ 17:45 .Sputum Sputum       Few polymorphonuclear leukocytes per low power field  Few Squamous epithelial cells per low power field  Rare Gram positive cocci in pairs per oil power field           Normal Respiratory Christy present    12-30 @ 14:53 Clean Catch Clean Catch (Midstream)                <10,000 CFU/mL Normal Urogenital Christy    12-29 @ 00:56 .Blood Blood-Peripheral                No Growth Final          RESPIRATORY CULTURES:              RADIOLOGY & ADDITIONAL STUDIES:        Pager [phone]  After 5 pm/weekends or if no response :[phone] infectious diseases progress note:    Patient is a 62y old  Male who presents with a chief complaint of Acute cholecystitis, gallstone pancreatitis (04 Jan 2023 08:19)        Acute pancreatitis without infection or necrosis                   Allergies    No Known Allergies    Intolerances        ANTIBIOTICS/RELEVANT:  antimicrobials  caspofungin IVPB 50 milliGRAM(s) IV Intermittent every 24 hours  caspofungin IVPB        immunologic:  epoetin teena-epbx (RETACRIT) Injectable 5000 Unit(s) IV Push <User Schedule>    OTHER:  acetaminophen    Suspension .. 650 milliGRAM(s) Oral every 6 hours PRN  aMIOdarone    Tablet 200 milliGRAM(s) Oral daily  aspirin  chewable 81 milliGRAM(s) Oral daily  atorvastatin 80 milliGRAM(s) Oral at bedtime  chlorhexidine 0.12% Liquid 15 milliLiter(s) Oral Mucosa every 12 hours  chlorhexidine 2% Cloths 1 Application(s) Topical <User Schedule>  dextrose 5%. 1000 milliLiter(s) IV Continuous <Continuous>  dextrose 50% Injectable 50 milliLiter(s) IV Push every 15 minutes  dextrose Oral Gel 15 Gram(s) Oral once PRN  glucagon  Injectable 1 milliGRAM(s) IntraMuscular once  heparin   Injectable 5000 Unit(s) SubCutaneous every 8 hours  insulin lispro (ADMELOG) corrective regimen sliding scale   SubCutaneous every 4 hours  insulin NPH human recombinant 7 Unit(s) SubCutaneous every 6 hours  midodrine 20 milliGRAM(s) Oral every 8 hours  multivitamin/minerals/iron Oral Solution (CENTRUM) 15 milliLiter(s) Oral daily  pantoprazole  Injectable 40 milliGRAM(s) IV Push daily  phenylephrine    Infusion 0.144 MICROgram(s)/kG/Min IV Continuous <Continuous>  polyethylene glycol 3350 17 Gram(s) Oral daily  senna 2 Tablet(s) Oral at bedtime  sevelamer carbonate Powder 800 milliGRAM(s) Enteral Tube two times a day  simethicone 80 milliGRAM(s) Chew daily PRN  sodium chloride 0.9% lock flush 10 milliLiter(s) IV Push every 1 hour PRN  thiamine 100 milliGRAM(s) Enteral Tube daily      Objective:  Vital Signs Last 24 Hrs  T(C): 36.8 (04 Jan 2023 08:00), Max: 37.3 (03 Jan 2023 12:00)  T(F): 98.2 (04 Jan 2023 08:00), Max: 99.1 (03 Jan 2023 12:00)  HR: 83 (04 Jan 2023 08:00) (78 - 108)  BP: 105/58 (03 Jan 2023 23:45) (105/58 - 105/58)  BP(mean): 77 (03 Jan 2023 23:45) (77 - 77)  RR: 24 (04 Jan 2023 08:00) (12 - 32)  SpO2: 100% (04 Jan 2023 08:00) (95% - 100%)    Parameters below as of 04 Jan 2023 08:00  Patient On (Oxygen Delivery Method): tracheostomy collar    O2 Concentration (%): 40    PHYSICAL EXAM:     Eyes:INOCENCIO, EOMI  Ear/Nose/Throat: no oral lesion, no sinus tenderness on percussion	  Neck:no JVD, no lymphadenopathy, supple  Respiratory: CTA lore  Cardiovascular: S1S2 RRR, no murmurs  Gastrointestinal:soft, (+) BS, no HSM  Extremities:no e/e/c        LABS:                        9.4    12.70 )-----------( 97       ( 04 Jan 2023 00:28 )             30.0     01-04    134<L>  |  95<L>  |  76<H>  ----------------------------<  162<H>  4.5   |  22  |  3.71<H>    Ca    8.6      04 Jan 2023 00:27  Phos  6.3     01-04  Mg     2.5     01-04    TPro  6.9  /  Alb  3.3  /  TBili  0.7  /  DBili  x   /  AST  24  /  ALT  15  /  AlkPhos  72  01-04            MICROBIOLOGY:    RECENT CULTURES:  01-02 @ 01:01 .Blood Blood-Peripheral   PCR    Growth in anaerobic bottle: Gram Positive Cocci in Clusters    Blood Culture PCR  Blood Culture PCR     Growth in anaerobic bottle: Staphylococcus epidermidis  Coag Negative Staphylococcus  Single set isolate, possible contaminant. Contact  Microbiology if susceptibility testing clinically  indicated.  ***Blood Panel PCR results on this specimen are available  approximately 3 hours after the Gram stain result.***  Gram stain, PCR, and/or culture results may not always  correspond due to difference in methodologies.  ************************************************************  This PCR assay was performed by multiplex PCR. This  Assay tests for 66 bacterial and resistance gene targets.  Please refer to the [facility] Labs test directory  at [email or web address] for details.    01-01 @ 17:45 .Sputum Sputum       Few polymorphonuclear leukocytes per low power field  Few Squamous epithelial cells per low power field  Rare Gram positive cocci in pairs per oil power field           Normal Respiratory Christy present    12-30 @ 14:53 Clean Catch Clean Catch (Midstream)                <10,000 CFU/mL Normal Urogenital Christy    12-29 @ 00:56 .Blood Blood-Peripheral                No Growth Final          RESPIRATORY CULTURES:              RADIOLOGY & ADDITIONAL STUDIES:        Pager [phone]  After 5 pm/weekends or if no response :[phone]

## 2023-01-04 NOTE — PROGRESS NOTE ADULT - ASSESSMENT
62M CAD s/p CABG, DM, HTN, presented as a trasnfer from [place] for STEMI, found to have pancreatitis not necrotizing and cholecystitis on CT AP, admitted to to SICU for respiratory failure due to ARDS. Moved to the CTU for ECMO for cardiogenic shock vs ARDS, ECMO decannulated on 12/8 with IABP placement and mitral clip and removed on 12/17, s/p trac on 12/16/22, now on trach collar.    #Candidemia  Unclear source  Bcx positive for candida tropicalis on 12/26/22  Repeat with no growth to date  Limited TTE shows no evidence for valvular vegetations, mitral clip in place    #ESRD on HD  Nephrology following    shiley in place      Continue caspofungin 50mg daily   The JUAN of organism is on the high side against diflucan    isolated CNS in BC is a contaminate  eye exam down the line if needed      Follow repeat blood cultures - negative so far                              62M CAD s/p CABG, DM, HTN, presented as a trasnfer from [place] for STEMI, found to have pancreatitis not necrotizing and cholecystitis on CT AP, admitted to to SICU for respiratory failure due to ARDS. Moved to the CTU for ECMO for cardiogenic shock vs ARDS, ECMO decannulated on 12/8 with IABP placement and mitral clip and removed on 12/17, s/p trac on 12/16/22, now on trach collar.    #Candidemia  Unclear source  Bcx positive for candida tropicalis on 12/26/22  Repeat with no growth to date  Limited TTE shows no evidence for valvular vegetations, mitral clip in place    #ESRD on HD  Nephrology following    shiley in place      Continue caspofungin 50mg daily   The JUAN of organism is on the high side against diflucan    isolated CNS in BC is a contaminate  eye exam down the line if needed      Follow repeat blood cultures - negative so far                              62M CAD s/p CABG, DM, HTN, presented as a trasnfer from [place] for STEMI, found to have pancreatitis not necrotizing and cholecystitis on CT AP, admitted to to SICU for respiratory failure due to ARDS. Moved to the CTU for ECMO for cardiogenic shock vs ARDS, ECMO decannulated on 12/8 with IABP placement and mitral clip and removed on 12/17, s/p trac on 12/16/22, now on trach collar.    #Candidemia  Unclear source  Bcx positive for candida tropicalis on 12/26/22  Repeat with no growth to date  Limited TTE shows no evidence for valvular vegetations, mitral clip in place    #ESRD on HD  Nephrology following    shiley in place      Continue caspofungin 50mg daily   The JUAN of organism is on the high side against diflucan    isolated CNS in BC is a contaminate  eye exam down the line if needed      Follow repeat blood cultures - negative so far

## 2023-01-04 NOTE — PROGRESS NOTE ADULT - ATTENDING COMMENTS
#eric  ERIC/ ATN in the setting of STEMI, cardiac arrest & cardiogenic shock  currently nonoliguric however UO still < 1L; ?lower past 24 hrs  requiring HD for UF/HD for volume removal  Had UF yesterday  plan HD today  #VO/pul edema  plan UF/HD today  #hypotension-pressors per ct icu  #anemia - on MIGUEL  d/w CT ICU

## 2023-01-04 NOTE — PROGRESS NOTE ADULT - NS ATTEND AMEND GEN_ALL_CORE FT
Patient was seen and examined at bedside with the advanced care provider.    Phenylephrine stopped yesterday.  However, still requiring midodrine.  Recommend checking TSH as well as AM cortisol to ensure no endocrine issues contributing to hypotension. GDMT limited given renal function and hypotension. Continues to need daily HD with 2L removal.  CXR today slightly more congested with elevated JVP on exam.  Aim to keep net negative 500 to 1LCC. May need additional fluid removal each session if BP can tolerate.  This will help with his vent weaning (has required more time on vent in the past few days).

## 2023-01-04 NOTE — PROGRESS NOTE ADULT - ASSESSMENT
63 YO M with a history of CAD s/p 4v CABG ~2019 in [facility], DM2 (A1c 7.3%), and HTN who initially presented to OSH with abdominal pain and n/v and found to have pancreatitis with lipase > 3000 though also with elevated troponins. CT abdomen revealed acute pancreatitis and his initial LVEF was 40-45%. He developed MSOF with hypoxic respiratory failure requiring intubation and ERIC and went into hypoxic PEA arrest requiring ACLS and due to persistent hypoxia and hypotension on pressors after ROSC was cannulated to [facility] ECMO (LFV, RFA, no anterograde perfusion catheter) on 11/25. Notably CTH that day revealed small subdural hemorrhage.     His post arrest TTE on 11/26 showed a signficantly worse LVEF of 5-10% with an AV that was only minimally opening. Since then he has had improvement in his LV function to ~35-40% and improved pulsatility while tolerating progressive slow ECMO weans. ECMO turndown (note in chart 11/30) was reassuring for ability to decannulate to IABP/inotropes. LHC 12/06 showed closure of his 3 vein grafts with graft to LAD patent though with distal native disease. No coronary intervention was done. IABP placed, ECMO successfully decannulated 12/08 (transfused 2u pRBCs during).     His ARDS has improved and he's now s/p trach. He's now off IABP, removed 12/17, and inotropes, however has previously been unable to tolerate GDMT due to soft BPs and renal failure requiring dialysis. 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 He's afebrile with improving leukocytosis. He has urine output but not enough to stay euvolemic. Remains on intermittent HD. He has evidence of volume overload on exam and CXR. His BPs are borderline on midodrine.    Previous cardiac studies:  TTE 12/19/22 - EF 24%, LVIDd 5.6, normal RV function, estimated pasp 45mmhg  LHC (12/06/22) - patent LIMA-LAD, RCA , LCx , aortogram w/ closure of 3 vein grafts, LVEDP 22 mm Hg, left-to-left and left-to-right collaterals  TTE (12/03/22) - mod-to-sev segmental LVSD (inferolateral, inferior, inferoseptal hypokinesis) 63 YO M with a history of CAD s/p 4v CABG ~2019 in [facility], DM2 (A1c 7.3%), and HTN who initially presented to OSH with abdominal pain and n/v and found to have pancreatitis with lipase > 3000 though also with elevated troponins. CT abdomen revealed acute pancreatitis and his initial LVEF was 40-45%. He developed MSOF with hypoxic respiratory failure requiring intubation and ERIC and went into hypoxic PEA arrest requiring ACLS and due to persistent hypoxia and hypotension on pressors after ROSC was cannulated to VA ECMO (LFV, RFA, no anterograde perfusion catheter) on 11/25. Notably CTH that day revealed small subdural hemorrhage.     His post arrest TTE on 11/26 showed a signficantly worse LVEF of 5-10% with an AV that was only minimally opening. Since then he has had improvement in his LV function to ~35-40% and improved pulsatility while tolerating progressive slow ECMO weans. ECMO turndown (note in chart 11/30) was reassuring for ability to decannulate to IABP/inotropes. LHC 12/06 showed closure of his 3 vein grafts with graft to LAD patent though with distal native disease. No coronary intervention was done. IABP placed, ECMO successfully decannulated 12/08 (transfused 2u pRBCs during).     His ARDS has improved and he's now s/p trach. He's now off IABP, removed 12/17, and inotropes, however has previously been unable to tolerate GDMT due to soft BPs and renal failure requiring dialysis. 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 He's afebrile with improving leukocytosis. He has urine output but not enough to stay euvolemic. Remains on intermittent HD. He has evidence of volume overload on exam and CXR. His BPs are borderline on midodrine.    Previous cardiac studies:  TTE 12/19/22 - EF 24%, LVIDd 5.6, normal RV function, estimated pasp 45mmhg  LHC (12/06/22) - patent LIMA-LAD, RCA , LCx , aortogram w/ closure of 3 vein grafts, LVEDP 22 mm Hg, left-to-left and left-to-right collaterals  TTE (12/03/22) - mod-to-sev segmental LVSD (inferolateral, inferior, inferoseptal hypokinesis) 61 YO M with a history of CAD s/p 4v CABG ~2019 in [facility], DM2 (A1c 7.3%), and HTN who initially presented to OSH with abdominal pain and n/v and found to have pancreatitis with lipase > 3000 though also with elevated troponins. CT abdomen revealed acute pancreatitis and his initial LVEF was 40-45%. He developed MSOF with hypoxic respiratory failure requiring intubation and ERIC and went into hypoxic PEA arrest requiring ACLS and due to persistent hypoxia and hypotension on pressors after ROSC was cannulated to VA ECMO (LFV, RFA, no anterograde perfusion catheter) on 11/25. Notably CTH that day revealed small subdural hemorrhage.     His post arrest TTE on 11/26 showed a signficantly worse LVEF of 5-10% with an AV that was only minimally opening. Since then he has had improvement in his LV function to ~35-40% and improved pulsatility while tolerating progressive slow ECMO weans. ECMO turndown (note in chart 11/30) was reassuring for ability to decannulate to IABP/inotropes. LHC 12/06 showed closure of his 3 vein grafts with graft to LAD patent though with distal native disease. No coronary intervention was done. IABP placed, ECMO successfully decannulated 12/08 (transfused 2u pRBCs during).     His ARDS has improved and he's now s/p trach. He's now off IABP, removed 12/17, and inotropes, however has previously been unable to tolerate GDMT due to soft BPs and renal failure requiring dialysis. 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 He's afebrile with improving leukocytosis. He has urine output but not enough to stay euvolemic. Remains on intermittent HD. He has evidence of volume overload on exam and CXR. His BPs are borderline on midodrine.    Previous cardiac studies:  TTE 12/19/22 - EF 24%, LVIDd 5.6, normal RV function, estimated pasp 45mmhg  LHC (12/06/22) - patent LIMA-LAD, RCA , LCx , aortogram w/ closure of 3 vein grafts, LVEDP 22 mm Hg, left-to-left and left-to-right collaterals  TTE (12/03/22) - mod-to-sev segmental LVSD (inferolateral, inferior, inferoseptal hypokinesis)

## 2023-01-04 NOTE — PROGRESS NOTE ADULT - PROBLEM SELECTOR PLAN 5
- likely arrest associated ATN, hypovolemia; nonoliguric   - was on CVVH now on iHD   - appreciate nephrology recommendations   - avoid nephrotoxins

## 2023-01-04 NOTE — PROGRESS NOTE ADULT - SUBJECTIVE AND OBJECTIVE BOX
Patient seen and examined at the bedside.    Remained critically ill on continuous ICU monitoring.    Brief Summary:  63 y/o male with PMH of CABG, DM, HTN, HLD presenting with a STEMI.   He was in cardiogenic shock requiring VA ECMO, respiratory failure requiring tracheostomy, and renal failure requiring RRT.    24 Hour events:      OBJECTIVE:  Vital Signs Last 24 Hrs  T(C): 36.7 (04 Jan 2023 03:00), Max: 37.3 (03 Jan 2023 12:00)  T(F): 98.1 (04 Jan 2023 03:00), Max: 99.1 (03 Jan 2023 12:00)  HR: 91 (04 Jan 2023 06:00) (78 - 108)  BP: 105/58 (03 Jan 2023 23:45) (105/58 - 105/58)  BP(mean): 77 (03 Jan 2023 23:45) (77 - 77)  RR: 22 (04 Jan 2023 06:00) (12 - 32)  SpO2: 100% (04 Jan 2023 06:00) (95% - 100%)    Parameters below as of 04 Jan 2023 04:00  Patient On (Oxygen Delivery Method): tracheostomy collar  O2 Concentration (%): 40    Mode: vent off             Physical Exam:   General: Sitting in chair, no acute distress   Neurology: Following commands, alert and interactive  Eyes: Bilateral pupils reactive   ENT/Neck: Trach collar  Respiratory: Breath sounds bilaterally  CV: Sinus Rhythm  Abdominal: Soft, nontender  Extremities: Warm        -------------------------------------------------------------------------------------------------------------------------------                        9.4    12.70 )-----------( 97       ( 04 Jan 2023 00:28 )             30.0     01-04    134<L>  |  95<L>  |  76<H>  ----------------------------<  162<H>  4.5   |  22  |  3.71<H>    Ca    8.6      04 Jan 2023 00:27  Phos  6.3     01-04  Mg     2.5     01-04    TPro  6.9  /  Alb  3.3  /  TBili  0.7  /  DBili  x   /  AST  24  /  ALT  15  /  AlkPhos  72  01-04    LIVER FUNCTIONS - ( 04 Jan 2023 00:27 )  Alb: 3.3 g/dL / Pro: 6.9 g/dL / ALK PHOS: 72 U/L / ALT: 15 U/L / AST: 24 U/L / GGT: x             ABG - ( 03 Jan 2023 23:53 )  pH, Arterial: 7.43  pH, Blood: x     /  pCO2: 37    /  pO2: 89    / HCO3: 25    / Base Excess: 0.4   /  SaO2: 98.1    -----------------------------------------------------------------------------------------------------------------------------  Assessment:  63 y/o M admitted with a STEMI and cardiogenic shock.     Cardiogenic shock s/p VA ECMO decannulated 12/8  Mitral regurgitation s/p Mitral clip x1 12/16/22  Acute respiratory distress/failure s/p Tracheostomy 12/16/22   At high risk for hemodynamic instability  ERIC/Renal failure  Fungemia  Anemia  Thrombocytopenia     Plan:   ***Neuro***  PT ongoing.  Pain control with prns  Optimize day/night cycles.    ***Cardiovascular***  At high risk for hemodynamic instability and cardiac arrhythmias.  Midodrine for pressure support, Phenylephrine only if needed.  PO Amiodarone for afib prophylaxis   ASA /Statin daily    ***Pulmonary***  Respiratory failure s/p tracheostomy  Long trach collar trials - gradually extending.  Deep breathing and coughing exercises.  Wean oxygen as able.    ***GI***  Protein calorie malnutrition - Tube feeds via nasal feeding tube  Protonix for stress ulcer prophylaxis   Bowel regimen with senna  simethicone PRN gas     ***Renal***  Renal failure - HD 1/2, plan for UF only yesterday.   Replete electrolytes as indicated.  Continue with Retacrit     ***ID***  Candidemia - On Caspofungin  Most recent cultures now with gpc - Vancomycin given, will follow up final results.    ***Endocrine***  DM with hyperglycemia - Insulin sliding scale, NPH.    ***Hematology***  Acute blood loss anemia and thrombocytopenia- no transfusion indication currently, will trend.  SQ Heparin for DVT prophylaxis      Patient requires continuous monitoring with bedside rhythm monitoring, pulse oximetry monitoring, and continuous central venous and arterial pressure monitoring; and intermittent blood gas analysis. Care plan discussed with the ICU care team.   Patient remained critical, at risk for life threatening decompensation.    I have spent 30 minutes providing critical care management to this patient.    By signing my name below, I, Rosio Alonzo, attest that this documentation has been prepared under the direction and in the presence of Jade Rosas MD  Electronically signed: Brian Burnett, 01-04-23 @ 07:25    I, Jade Rosas MD, personally performed the services described in this documentation. all medical record entries made by the scribe were at my direction and in my presence. I have reviewed the chart and agree that the record reflects my personal performance and is accurate and complete  Electronically signed: Jade Rosas MD Patient seen and examined at the bedside.    Remained critically ill on continuous ICU monitoring.    Brief Summary:  61 y/o male with PMH of CABG, DM, HTN, HLD presenting with a STEMI.   He was in cardiogenic shock requiring VA ECMO, respiratory failure requiring tracheostomy, and renal failure requiring RRT.    24 Hour events:      OBJECTIVE:  Vital Signs Last 24 Hrs  T(C): 36.7 (04 Jan 2023 03:00), Max: 37.3 (03 Jan 2023 12:00)  T(F): 98.1 (04 Jan 2023 03:00), Max: 99.1 (03 Jan 2023 12:00)  HR: 91 (04 Jan 2023 06:00) (78 - 108)  BP: 105/58 (03 Jan 2023 23:45) (105/58 - 105/58)  BP(mean): 77 (03 Jan 2023 23:45) (77 - 77)  RR: 22 (04 Jan 2023 06:00) (12 - 32)  SpO2: 100% (04 Jan 2023 06:00) (95% - 100%)    Parameters below as of 04 Jan 2023 04:00  Patient On (Oxygen Delivery Method): tracheostomy collar  O2 Concentration (%): 40    Mode: vent off             Physical Exam:   General: Sitting in chair, no acute distress   Neurology: Following commands, alert and interactive  Eyes: Bilateral pupils reactive   ENT/Neck: Trach collar  Respiratory: Breath sounds bilaterally  CV: Sinus Rhythm  Abdominal: Soft, nontender  Extremities: Warm        -------------------------------------------------------------------------------------------------------------------------------                        9.4    12.70 )-----------( 97       ( 04 Jan 2023 00:28 )             30.0     01-04    134<L>  |  95<L>  |  76<H>  ----------------------------<  162<H>  4.5   |  22  |  3.71<H>    Ca    8.6      04 Jan 2023 00:27  Phos  6.3     01-04  Mg     2.5     01-04    TPro  6.9  /  Alb  3.3  /  TBili  0.7  /  DBili  x   /  AST  24  /  ALT  15  /  AlkPhos  72  01-04    LIVER FUNCTIONS - ( 04 Jan 2023 00:27 )  Alb: 3.3 g/dL / Pro: 6.9 g/dL / ALK PHOS: 72 U/L / ALT: 15 U/L / AST: 24 U/L / GGT: x             ABG - ( 03 Jan 2023 23:53 )  pH, Arterial: 7.43  pH, Blood: x     /  pCO2: 37    /  pO2: 89    / HCO3: 25    / Base Excess: 0.4   /  SaO2: 98.1    -----------------------------------------------------------------------------------------------------------------------------  Assessment:  61 y/o M admitted with a STEMI and cardiogenic shock.     Cardiogenic shock s/p VA ECMO decannulated 12/8  Mitral regurgitation s/p Mitral clip x1 12/16/22  Acute respiratory distress/failure s/p Tracheostomy 12/16/22   At high risk for hemodynamic instability  ERIC/Renal failure  Fungemia  Anemia  Thrombocytopenia     Plan:   ***Neuro***  PT ongoing.  Pain control with prns  Optimize day/night cycles.    ***Cardiovascular***  At high risk for hemodynamic instability and cardiac arrhythmias.  Midodrine for pressure support, Phenylephrine only if needed.  PO Amiodarone for afib prophylaxis   ASA /Statin daily    ***Pulmonary***  Respiratory failure s/p tracheostomy  Long trach collar trials - gradually extending.  Deep breathing and coughing exercises.  Wean oxygen as able.    ***GI***  Protein calorie malnutrition - Tube feeds via nasal feeding tube  Protonix for stress ulcer prophylaxis   Bowel regimen with senna  simethicone PRN gas     ***Renal***  Renal failure - HD 1/2, plan for UF only yesterday.   Replete electrolytes as indicated.  Continue with Retacrit     ***ID***  Candidemia - On Caspofungin  Most recent cultures now with gpc - Vancomycin given, will follow up final results.    ***Endocrine***  DM with hyperglycemia - Insulin sliding scale, NPH.    ***Hematology***  Acute blood loss anemia and thrombocytopenia- no transfusion indication currently, will trend.  SQ Heparin for DVT prophylaxis      Patient requires continuous monitoring with bedside rhythm monitoring, pulse oximetry monitoring, and continuous central venous and arterial pressure monitoring; and intermittent blood gas analysis. Care plan discussed with the ICU care team.   Patient remained critical, at risk for life threatening decompensation.    I have spent 30 minutes providing critical care management to this patient.    By signing my name below, I, Rosio Alonzo, attest that this documentation has been prepared under the direction and in the presence of Jade Rosas MD  Electronically signed: Brian Burnett, 01-04-23 @ 07:25    I, Jade Rosas MD, personally performed the services described in this documentation. all medical record entries made by the scribe were at my direction and in my presence. I have reviewed the chart and agree that the record reflects my personal performance and is accurate and complete  Electronically signed: Jade Rosas MD Patient seen and examined at the bedside.    Remained critically ill on continuous ICU monitoring.    Brief Summary:  63 y/o male with PMH of CABG, DM, HTN, HLD presenting with a STEMI.   He was in cardiogenic shock requiring VA ECMO, respiratory failure requiring tracheostomy, and renal failure requiring RRT.    24 Hour events:  - Patient is OOBTC  - Diaylsis today - 2L   - TC since 4am - plan for 24 hours  - PT/OT as tolerated     OBJECTIVE:  Vital Signs Last 24 Hrs  T(C): 36.7 (04 Jan 2023 03:00), Max: 37.3 (03 Jan 2023 12:00)  T(F): 98.1 (04 Jan 2023 03:00), Max: 99.1 (03 Jan 2023 12:00)  HR: 91 (04 Jan 2023 06:00) (78 - 108)  BP: 105/58 (03 Jan 2023 23:45) (105/58 - 105/58)  BP(mean): 77 (03 Jan 2023 23:45) (77 - 77)  RR: 22 (04 Jan 2023 06:00) (12 - 32)  SpO2: 100% (04 Jan 2023 06:00) (95% - 100%)    Parameters below as of 04 Jan 2023 04:00  Patient On (Oxygen Delivery Method): tracheostomy collar  O2 Concentration (%): 40    Mode: vent off             Physical Exam:   General: Sitting in chair, no acute distress   Neurology: Following commands, alert and interactive  Eyes: Bilateral pupils reactive   ENT/Neck: Trach collar  Respiratory: Breath sounds bilaterally  CV: Sinus Rhythm  Abdominal: Soft, nontender  Extremities: Warm        -------------------------------------------------------------------------------------------------------------------------------                        9.4    12.70 )-----------( 97       ( 04 Jan 2023 00:28 )             30.0     01-04    134<L>  |  95<L>  |  76<H>  ----------------------------<  162<H>  4.5   |  22  |  3.71<H>    Ca    8.6      04 Jan 2023 00:27  Phos  6.3     01-04  Mg     2.5     01-04    TPro  6.9  /  Alb  3.3  /  TBili  0.7  /  DBili  x   /  AST  24  /  ALT  15  /  AlkPhos  72  01-04    LIVER FUNCTIONS - ( 04 Jan 2023 00:27 )  Alb: 3.3 g/dL / Pro: 6.9 g/dL / ALK PHOS: 72 U/L / ALT: 15 U/L / AST: 24 U/L / GGT: x             ABG - ( 03 Jan 2023 23:53 )  pH, Arterial: 7.43  pH, Blood: x     /  pCO2: 37    /  pO2: 89    / HCO3: 25    / Base Excess: 0.4   /  SaO2: 98.1    -----------------------------------------------------------------------------------------------------------------------------  Assessment:  63 y/o M admitted with a STEMI and cardiogenic shock.     Cardiogenic shock s/p VA ECMO decannulated 12/8  Mitral regurgitation s/p Mitral clip x1 12/16/22  Acute respiratory distress/failure s/p Tracheostomy 12/16/22   At high risk for hemodynamic instability  ERIC/Renal failure  Fungemia  Anemia  Thrombocytopenia     Plan:   ***Neuro***  PT ongoing.  Pain control with prns  Optimize day/night cycles.    ***Cardiovascular***  At high risk for hemodynamic instability and cardiac arrhythmias.  Midodrine for pressure support, Phenylephrine only if needed.  PO Amiodarone for afib prophylaxis   ASA /Statin daily    ***Pulmonary***  Respiratory failure s/p tracheostomy  Long trach collar trials - gradually extending.  Deep breathing and coughing exercises.  Wean oxygen as able.    ***GI***  Protein calorie malnutrition - Tube feeds via nasal feeding tube  Protonix for stress ulcer prophylaxis   Bowel regimen with senna  simethicone PRN gas     ***Renal***  Renal failure - HD 1/2, plan for UF only yesterday.   Replete electrolytes as indicated.  Continue with Retacrit     ***ID***  Candidemia - On Caspofungin  Most recent cultures now with gpc - Vancomycin given, will follow up final results.    ***Endocrine***  DM with hyperglycemia - Insulin sliding scale, NPH.    ***Hematology***  Acute blood loss anemia and thrombocytopenia- no transfusion indication currently, will trend.  SQ Heparin for DVT prophylaxis      Patient requires continuous monitoring with bedside rhythm monitoring, pulse oximetry monitoring, and continuous central venous and arterial pressure monitoring; and intermittent blood gas analysis. Care plan discussed with the ICU care team.   Patient remained critical, at risk for life threatening decompensation.    I have spent 30 minutes providing critical care management to this patient.    By signing my name below, I, Rosio Alonzo, attest that this documentation has been prepared under the direction and in the presence of Jade Rosas MD  Electronically signed: Brian Burnett, 01-04-23 @ 07:25    I, Jade Rosas MD, personally performed the services described in this documentation. all medical record entries made by the scribe were at my direction and in my presence. I have reviewed the chart and agree that the record reflects my personal performance and is accurate and complete  Electronically signed: Jade Rosas MD Patient seen and examined at the bedside.    Remained critically ill on continuous ICU monitoring.    Brief Summary:  61 y/o male with PMH of CABG, DM, HTN, HLD presenting with a STEMI.   He was in cardiogenic shock requiring VA ECMO, respiratory failure requiring tracheostomy, and renal failure requiring RRT.    24 Hour events:  - Patient is OOBTC  - Diaylsis today - 2L   - TC since 4am - plan for 24 hours  - PT/OT as tolerated     OBJECTIVE:  Vital Signs Last 24 Hrs  T(C): 36.7 (04 Jan 2023 03:00), Max: 37.3 (03 Jan 2023 12:00)  T(F): 98.1 (04 Jan 2023 03:00), Max: 99.1 (03 Jan 2023 12:00)  HR: 91 (04 Jan 2023 06:00) (78 - 108)  BP: 105/58 (03 Jan 2023 23:45) (105/58 - 105/58)  BP(mean): 77 (03 Jan 2023 23:45) (77 - 77)  RR: 22 (04 Jan 2023 06:00) (12 - 32)  SpO2: 100% (04 Jan 2023 06:00) (95% - 100%)    Parameters below as of 04 Jan 2023 04:00  Patient On (Oxygen Delivery Method): tracheostomy collar  O2 Concentration (%): 40    Mode: vent off             Physical Exam:   General: Sitting in chair, no acute distress   Neurology: Following commands, alert and interactive  Eyes: Bilateral pupils reactive   ENT/Neck: Trach collar  Respiratory: Breath sounds bilaterally  CV: Sinus Rhythm  Abdominal: Soft, nontender  Extremities: Warm        -------------------------------------------------------------------------------------------------------------------------------                        9.4    12.70 )-----------( 97       ( 04 Jan 2023 00:28 )             30.0     01-04    134<L>  |  95<L>  |  76<H>  ----------------------------<  162<H>  4.5   |  22  |  3.71<H>    Ca    8.6      04 Jan 2023 00:27  Phos  6.3     01-04  Mg     2.5     01-04    TPro  6.9  /  Alb  3.3  /  TBili  0.7  /  DBili  x   /  AST  24  /  ALT  15  /  AlkPhos  72  01-04    LIVER FUNCTIONS - ( 04 Jan 2023 00:27 )  Alb: 3.3 g/dL / Pro: 6.9 g/dL / ALK PHOS: 72 U/L / ALT: 15 U/L / AST: 24 U/L / GGT: x             ABG - ( 03 Jan 2023 23:53 )  pH, Arterial: 7.43  pH, Blood: x     /  pCO2: 37    /  pO2: 89    / HCO3: 25    / Base Excess: 0.4   /  SaO2: 98.1    -----------------------------------------------------------------------------------------------------------------------------  Assessment:  61 y/o M admitted with a STEMI and cardiogenic shock.     Cardiogenic shock s/p VA ECMO decannulated 12/8  Mitral regurgitation s/p Mitral clip x1 12/16/22  Acute respiratory distress/failure s/p Tracheostomy 12/16/22   At high risk for hemodynamic instability  ERIC/Renal failure  Fungemia  Anemia  Thrombocytopenia     Plan:   ***Neuro***  PT ongoing.  Pain control with prns  Optimize day/night cycles.    ***Cardiovascular***  At high risk for hemodynamic instability and cardiac arrhythmias.  Midodrine for pressure support, Phenylephrine only if needed.  PO Amiodarone for afib prophylaxis   ASA /Statin daily    ***Pulmonary***  Respiratory failure s/p tracheostomy  Long trach collar trials - gradually extending.  Deep breathing and coughing exercises.  Wean oxygen as able.    ***GI***  Protein calorie malnutrition - Tube feeds via nasal feeding tube  Protonix for stress ulcer prophylaxis   Bowel regimen with senna  simethicone PRN gas     ***Renal***  Renal failure - HD 1/2, plan for UF only yesterday.   Replete electrolytes as indicated.  Continue with Retacrit     ***ID***  Candidemia - On Caspofungin  Most recent cultures now with gpc - Vancomycin given, will follow up final results.    ***Endocrine***  DM with hyperglycemia - Insulin sliding scale, NPH.    ***Hematology***  Acute blood loss anemia and thrombocytopenia- no transfusion indication currently, will trend.  SQ Heparin for DVT prophylaxis      Patient requires continuous monitoring with bedside rhythm monitoring, pulse oximetry monitoring, and continuous central venous and arterial pressure monitoring; and intermittent blood gas analysis. Care plan discussed with the ICU care team.   Patient remained critical, at risk for life threatening decompensation.    I have spent 30 minutes providing critical care management to this patient.    By signing my name below, I, Rosio Alonzo, attest that this documentation has been prepared under the direction and in the presence of Jade Rosas MD  Electronically signed: Brian Burnett, 01-04-23 @ 07:25    I, Jade Rosas MD, personally performed the services described in this documentation. all medical record entries made by the scribe were at my direction and in my presence. I have reviewed the chart and agree that the record reflects my personal performance and is accurate and complete  Electronically signed: Jade Rosas MD Patient seen and examined at the bedside.    Remained critically ill on continuous ICU monitoring.    Brief Summary:  61 y/o male with PMH of CABG, DM, HTN, HLD presenting with a STEMI.   He was in cardiogenic shock requiring VA ECMO, respiratory failure requiring tracheostomy, and renal failure requiring RRT.    24 Hour events:  - No acute events  - Trach collar for 22 hours without issue  - HD yesterday and again today.      Objective:  Vital Signs Last 24 Hrs  T(C): 36.7 (04 Jan 2023 03:00), Max: 37.3 (03 Jan 2023 12:00)  T(F): 98.1 (04 Jan 2023 03:00), Max: 99.1 (03 Jan 2023 12:00)  HR: 91 (04 Jan 2023 06:00) (78 - 108)  BP: 105/58 (03 Jan 2023 23:45) (105/58 - 105/58)  BP(mean): 77 (03 Jan 2023 23:45) (77 - 77)  RR: 22 (04 Jan 2023 06:00) (12 - 32)  SpO2: 100% (04 Jan 2023 06:00) (95% - 100%)    Parameters below as of 04 Jan 2023 04:00  Patient On (Oxygen Delivery Method): tracheostomy collar  O2 Concentration (%): 40              Physical Exam:   General: Sitting in chair, no acute distress   Neurology: Following commands, alert and interactive  Eyes: Bilateral pupils reactive   ENT/Neck: Trach collar  Respiratory: Breath sounds bilaterally  CV: Sinus Rhythm  Abdominal: Soft, nontender  Extremities: Warm          -------------------------------------------------------------------------------------------------------------------------------       Labs:                     9.4    12.70 )-----------( 97       ( 04 Jan 2023 00:28 )             30.0     01-04    134<L>  |  95<L>  |  76<H>  ----------------------------<  162<H>  4.5   |  22  |  3.71<H>    Ca    8.6      04 Jan 2023 00:27  Phos  6.3     01-04  Mg     2.5     01-04    TPro  6.9  /  Alb  3.3  /  TBili  0.7  /  DBili  x   /  AST  24  /  ALT  15  /  AlkPhos  72  01-04    LIVER FUNCTIONS - ( 04 Jan 2023 00:27 )  Alb: 3.3 g/dL / Pro: 6.9 g/dL / ALK PHOS: 72 U/L / ALT: 15 U/L / AST: 24 U/L / GGT: x             ABG - ( 03 Jan 2023 23:53 )  pH, Arterial: 7.43  pH, Blood: x     /  pCO2: 37    /  pO2: 89    / HCO3: 25    / Base Excess: 0.4   /  SaO2: 98.1    -----------------------------------------------------------------------------------------------------------------------------    Assessment:  61 y/o M admitted with a STEMI and cardiogenic shock.     Cardiogenic shock s/p VA ECMO decannulated 12/8  Mitral regurgitation s/p Mitral clip x1 12/16/22  Acute respiratory distress/failure s/p Tracheostomy 12/16/22   At high risk for hemodynamic instability  ERIC/Renal failure  Fungemia  Anemia  Thrombocytopenia     Plan:   ***Neuro***  PT ongoing.  Pain control with prns  Optimize day/night cycles.    ***Cardiovascular***  At high risk for hemodynamic instability and cardiac arrhythmias.  Midodrine to support blood pressure.  Continue Amiodarone  ASA /Statin daily    ***Pulmonary***  Respiratory failure s/p tracheostomy  Long trach collar trials - gradually extending - plan for 24 hours.  Deep breathing and coughing exercises.  Wean oxygen as able.    ***GI***  Protein calorie malnutrition - Tube feeds via nasal feeding tube  Protonix for GI prophylaxis   Bowel regimen    ***Renal***  Renal failure - HD again today with volume removal.  Replete electrolytes as indicated.  Continue with Retacrit     ***ID***  Candidemia - On Caspofungin  Most recent cultures now with staph epi, likely contaminant, no further Vancomycin.    ***Endocrine***  DM with hyperglycemia - Insulin sliding scale, NPH.    ***Hematology***  Acute blood loss anemia and thrombocytopenia- no transfusion indication currently, will trend.  SQ Heparin for VTE prophylaxis        Patient requires continuous monitoring with bedside rhythm monitoring, pulse oximetry monitoring, and continuous central venous and arterial pressure monitoring; and intermittent blood gas analysis. Care plan discussed with the ICU care team.   Patient remains critical, at risk for life threatening decompensation.    I have spent 35 minutes providing critical care management to this patient.    By signing my name below, I, Rosio Alonzo, attest that this documentation has been prepared under the direction and in the presence of Jade Rosas MD  Electronically signed: Brian Burnett, 01-04-23 @ 07:25    I, Jade Rosas MD, personally performed the services described in this documentation. all medical record entries made by the scribe were at my direction and in my presence. I have reviewed the chart and agree that the record reflects my personal performance and is accurate and complete  Electronically signed: Jade Rosas MD Patient seen and examined at the bedside.    Remained critically ill on continuous ICU monitoring.    Brief Summary:  63 y/o male with PMH of CABG, DM, HTN, HLD presenting with a STEMI.   He was in cardiogenic shock requiring VA ECMO, respiratory failure requiring tracheostomy, and renal failure requiring RRT.    24 Hour events:  - No acute events  - Trach collar for 22 hours without issue  - HD yesterday and again today.      Objective:  Vital Signs Last 24 Hrs  T(C): 36.7 (04 Jan 2023 03:00), Max: 37.3 (03 Jan 2023 12:00)  T(F): 98.1 (04 Jan 2023 03:00), Max: 99.1 (03 Jan 2023 12:00)  HR: 91 (04 Jan 2023 06:00) (78 - 108)  BP: 105/58 (03 Jan 2023 23:45) (105/58 - 105/58)  BP(mean): 77 (03 Jan 2023 23:45) (77 - 77)  RR: 22 (04 Jan 2023 06:00) (12 - 32)  SpO2: 100% (04 Jan 2023 06:00) (95% - 100%)    Parameters below as of 04 Jan 2023 04:00  Patient On (Oxygen Delivery Method): tracheostomy collar  O2 Concentration (%): 40              Physical Exam:   General: Sitting in chair, no acute distress   Neurology: Following commands, alert and interactive  Eyes: Bilateral pupils reactive   ENT/Neck: Trach collar  Respiratory: Breath sounds bilaterally  CV: Sinus Rhythm  Abdominal: Soft, nontender  Extremities: Warm          -------------------------------------------------------------------------------------------------------------------------------       Labs:                     9.4    12.70 )-----------( 97       ( 04 Jan 2023 00:28 )             30.0     01-04    134<L>  |  95<L>  |  76<H>  ----------------------------<  162<H>  4.5   |  22  |  3.71<H>    Ca    8.6      04 Jan 2023 00:27  Phos  6.3     01-04  Mg     2.5     01-04    TPro  6.9  /  Alb  3.3  /  TBili  0.7  /  DBili  x   /  AST  24  /  ALT  15  /  AlkPhos  72  01-04    LIVER FUNCTIONS - ( 04 Jan 2023 00:27 )  Alb: 3.3 g/dL / Pro: 6.9 g/dL / ALK PHOS: 72 U/L / ALT: 15 U/L / AST: 24 U/L / GGT: x             ABG - ( 03 Jan 2023 23:53 )  pH, Arterial: 7.43  pH, Blood: x     /  pCO2: 37    /  pO2: 89    / HCO3: 25    / Base Excess: 0.4   /  SaO2: 98.1    -----------------------------------------------------------------------------------------------------------------------------    Assessment:  63 y/o M admitted with a STEMI and cardiogenic shock.     Cardiogenic shock s/p VA ECMO decannulated 12/8  Mitral regurgitation s/p Mitral clip x1 12/16/22  Acute respiratory distress/failure s/p Tracheostomy 12/16/22   At high risk for hemodynamic instability  ERIC/Renal failure  Fungemia  Anemia  Thrombocytopenia     Plan:   ***Neuro***  PT ongoing.  Pain control with prns  Optimize day/night cycles.    ***Cardiovascular***  At high risk for hemodynamic instability and cardiac arrhythmias.  Midodrine to support blood pressure.  Continue Amiodarone  ASA /Statin daily    ***Pulmonary***  Respiratory failure s/p tracheostomy  Long trach collar trials - gradually extending - plan for 24 hours.  Deep breathing and coughing exercises.  Wean oxygen as able.    ***GI***  Protein calorie malnutrition - Tube feeds via nasal feeding tube  Protonix for GI prophylaxis   Bowel regimen    ***Renal***  Renal failure - HD again today with volume removal.  Replete electrolytes as indicated.  Continue with Retacrit     ***ID***  Candidemia - On Caspofungin  Most recent cultures now with staph epi, likely contaminant, no further Vancomycin.    ***Endocrine***  DM with hyperglycemia - Insulin sliding scale, NPH.    ***Hematology***  Acute blood loss anemia and thrombocytopenia- no transfusion indication currently, will trend.  SQ Heparin for VTE prophylaxis        Patient requires continuous monitoring with bedside rhythm monitoring, pulse oximetry monitoring, and continuous central venous and arterial pressure monitoring; and intermittent blood gas analysis. Care plan discussed with the ICU care team.   Patient remains critical, at risk for life threatening decompensation.    I have spent 35 minutes providing critical care management to this patient.    By signing my name below, I, Rosio Alonzo, attest that this documentation has been prepared under the direction and in the presence of Jade Rosas MD  Electronically signed: Brian Burnett, 01-04-23 @ 07:25    I, Jade Rosas MD, personally performed the services described in this documentation. all medical record entries made by the scribe were at my direction and in my presence. I have reviewed the chart and agree that the record reflects my personal performance and is accurate and complete  Electronically signed: Jade Rosas MD Patient seen and examined at the bedside.    Remains critically ill on continuous ICU monitoring.    Brief Summary:  61 y/o male with PMH of CABG, DM, HTN, HLD presenting with a STEMI.   He was in cardiogenic shock requiring VA ECMO, respiratory failure requiring tracheostomy, and renal failure requiring RRT.    24 Hour events:  - No acute events  - Trach collar for 22 hours without issue  - HD yesterday and again today.      Objective:  Vital Signs Last 24 Hrs  T(C): 36.7 (04 Jan 2023 03:00), Max: 37.3 (03 Jan 2023 12:00)  T(F): 98.1 (04 Jan 2023 03:00), Max: 99.1 (03 Jan 2023 12:00)  HR: 91 (04 Jan 2023 06:00) (78 - 108)  BP: 105/58 (03 Jan 2023 23:45) (105/58 - 105/58)  BP(mean): 77 (03 Jan 2023 23:45) (77 - 77)  RR: 22 (04 Jan 2023 06:00) (12 - 32)  SpO2: 100% (04 Jan 2023 06:00) (95% - 100%)    Parameters below as of 04 Jan 2023 04:00  Patient On (Oxygen Delivery Method): tracheostomy collar  O2 Concentration (%): 40              Physical Exam:   General: Sitting in chair, no acute distress   Neurology: Following commands, alert and interactive  Eyes: Bilateral pupils reactive   ENT/Neck: Trach collar  Respiratory: Breath sounds bilaterally  CV: Sinus Rhythm  Abdominal: Soft, nontender  Extremities: Warm          -------------------------------------------------------------------------------------------------------------------------------       Labs:                     9.4    12.70 )-----------( 97       ( 04 Jan 2023 00:28 )             30.0     01-04    134<L>  |  95<L>  |  76<H>  ----------------------------<  162<H>  4.5   |  22  |  3.71<H>    Ca    8.6      04 Jan 2023 00:27  Phos  6.3     01-04  Mg     2.5     01-04    TPro  6.9  /  Alb  3.3  /  TBili  0.7  /  DBili  x   /  AST  24  /  ALT  15  /  AlkPhos  72  01-04    LIVER FUNCTIONS - ( 04 Jan 2023 00:27 )  Alb: 3.3 g/dL / Pro: 6.9 g/dL / ALK PHOS: 72 U/L / ALT: 15 U/L / AST: 24 U/L / GGT: x             ABG - ( 03 Jan 2023 23:53 )  pH, Arterial: 7.43  pH, Blood: x     /  pCO2: 37    /  pO2: 89    / HCO3: 25    / Base Excess: 0.4   /  SaO2: 98.1    -----------------------------------------------------------------------------------------------------------------------------    Assessment:  61 y/o M admitted with a STEMI and cardiogenic shock.     Cardiogenic shock s/p VA ECMO decannulated 12/8  Mitral regurgitation s/p Mitral clip x1 12/16/22  Acute respiratory distress/failure s/p Tracheostomy 12/16/22   At high risk for hemodynamic instability  ERIC/Renal failure  Fungemia  Anemia  Thrombocytopenia     Plan:   ***Neuro***  PT ongoing.  Pain control with prns  Optimize day/night cycles.    ***Cardiovascular***  At high risk for hemodynamic instability and cardiac arrhythmias.  Midodrine to support blood pressure.  Continue Amiodarone  ASA /Statin daily    ***Pulmonary***  Respiratory failure s/p tracheostomy  Long trach collar trials - gradually extending - plan for 24 hours.  Deep breathing and coughing exercises.  Wean oxygen as able.    ***GI***  Protein calorie malnutrition - Tube feeds via nasal feeding tube  Protonix for GI prophylaxis   Bowel regimen    ***Renal***  Renal failure - HD again today with volume removal.  Replete electrolytes as indicated.  Continue with Retacrit     ***ID***  Candidemia - On Caspofungin  Most recent cultures now with staph epi, likely contaminant, no further Vancomycin.    ***Endocrine***  DM with hyperglycemia - Insulin sliding scale, NPH.    ***Hematology***  Acute blood loss anemia and thrombocytopenia- no transfusion indication currently, will trend.  SQ Heparin for VTE prophylaxis        Patient requires continuous monitoring with bedside rhythm monitoring, pulse oximetry monitoring, and continuous central venous and arterial pressure monitoring; and intermittent blood gas analysis. Care plan discussed with the ICU care team.   Patient remains critical, at risk for life threatening decompensation.    I have spent 35 minutes providing critical care management to this patient.    By signing my name below, I, Rosio Alonzo, attest that this documentation has been prepared under the direction and in the presence of Jade Rosas MD  Electronically signed: Brian Burnett, 01-04-23 @ 07:25    I, Jade Rosas MD, personally performed the services described in this documentation. all medical record entries made by the scribe were at my direction and in my presence. I have reviewed the chart and agree that the record reflects my personal performance and is accurate and complete  Electronically signed: Jade Rosas MD Patient seen and examined at the bedside.    Remains critically ill on continuous ICU monitoring.    Brief Summary:  63 y/o male with PMH of CABG, DM, HTN, HLD presenting with a STEMI.   He was in cardiogenic shock requiring VA ECMO, respiratory failure requiring tracheostomy, and renal failure requiring RRT.    24 Hour events:  - No acute events  - Trach collar for 22 hours without issue  - HD yesterday and again today.      Objective:  Vital Signs Last 24 Hrs  T(C): 36.7 (04 Jan 2023 03:00), Max: 37.3 (03 Jan 2023 12:00)  T(F): 98.1 (04 Jan 2023 03:00), Max: 99.1 (03 Jan 2023 12:00)  HR: 91 (04 Jan 2023 06:00) (78 - 108)  BP: 105/58 (03 Jan 2023 23:45) (105/58 - 105/58)  BP(mean): 77 (03 Jan 2023 23:45) (77 - 77)  RR: 22 (04 Jan 2023 06:00) (12 - 32)  SpO2: 100% (04 Jan 2023 06:00) (95% - 100%)    Parameters below as of 04 Jan 2023 04:00  Patient On (Oxygen Delivery Method): tracheostomy collar  O2 Concentration (%): 40              Physical Exam:   General: Sitting in chair, no acute distress   Neurology: Following commands, alert and interactive  Eyes: Bilateral pupils reactive   ENT/Neck: Trach collar  Respiratory: Breath sounds bilaterally  CV: Sinus Rhythm  Abdominal: Soft, nontender  Extremities: Warm          -------------------------------------------------------------------------------------------------------------------------------       Labs:                     9.4    12.70 )-----------( 97       ( 04 Jan 2023 00:28 )             30.0     01-04    134<L>  |  95<L>  |  76<H>  ----------------------------<  162<H>  4.5   |  22  |  3.71<H>    Ca    8.6      04 Jan 2023 00:27  Phos  6.3     01-04  Mg     2.5     01-04    TPro  6.9  /  Alb  3.3  /  TBili  0.7  /  DBili  x   /  AST  24  /  ALT  15  /  AlkPhos  72  01-04    LIVER FUNCTIONS - ( 04 Jan 2023 00:27 )  Alb: 3.3 g/dL / Pro: 6.9 g/dL / ALK PHOS: 72 U/L / ALT: 15 U/L / AST: 24 U/L / GGT: x             ABG - ( 03 Jan 2023 23:53 )  pH, Arterial: 7.43  pH, Blood: x     /  pCO2: 37    /  pO2: 89    / HCO3: 25    / Base Excess: 0.4   /  SaO2: 98.1    -----------------------------------------------------------------------------------------------------------------------------    Assessment:  63 y/o M admitted with a STEMI and cardiogenic shock.     Cardiogenic shock s/p VA ECMO decannulated 12/8  Mitral regurgitation s/p Mitral clip x1 12/16/22  Acute respiratory distress/failure s/p Tracheostomy 12/16/22   At high risk for hemodynamic instability  ERIC/Renal failure  Fungemia  Anemia  Thrombocytopenia     Plan:   ***Neuro***  PT ongoing.  Pain control with prns  Optimize day/night cycles.    ***Cardiovascular***  At high risk for hemodynamic instability and cardiac arrhythmias.  Midodrine to support blood pressure.  Continue Amiodarone  ASA /Statin daily    ***Pulmonary***  Respiratory failure s/p tracheostomy  Long trach collar trials - gradually extending - plan for 24 hours.  Deep breathing and coughing exercises.  Wean oxygen as able.    ***GI***  Protein calorie malnutrition - Tube feeds via nasal feeding tube  Protonix for GI prophylaxis   Bowel regimen    ***Renal***  Renal failure - HD again today with volume removal.  Replete electrolytes as indicated.  Continue with Retacrit     ***ID***  Candidemia - On Caspofungin  Most recent cultures now with staph epi, likely contaminant, no further Vancomycin.    ***Endocrine***  DM with hyperglycemia - Insulin sliding scale, NPH.    ***Hematology***  Acute blood loss anemia and thrombocytopenia- no transfusion indication currently, will trend.  SQ Heparin for VTE prophylaxis        Patient requires continuous monitoring with bedside rhythm monitoring, pulse oximetry monitoring, and continuous central venous and arterial pressure monitoring; and intermittent blood gas analysis. Care plan discussed with the ICU care team.   Patient remains critical, at risk for life threatening decompensation.    I have spent 35 minutes providing critical care management to this patient.    By signing my name below, I, Rosio Alonzo, attest that this documentation has been prepared under the direction and in the presence of Jade Rosas MD  Electronically signed: Brian Burnett, 01-04-23 @ 07:25    I, Jade Rosas MD, personally performed the services described in this documentation. all medical record entries made by the scribe were at my direction and in my presence. I have reviewed the chart and agree that the record reflects my personal performance and is accurate and complete  Electronically signed: Jade Rosas MD

## 2023-01-04 NOTE — PROGRESS NOTE ADULT - PROBLEM SELECTOR PLAN 3
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  - eventual beta blocker

## 2023-01-04 NOTE — CHART NOTE - NSCHARTNOTEFT_GEN_A_CORE
Nutrition Follow Up Note  Patient seen for: malnutrition follow up.     Chart reviewed, events noted. Pt is a 63 y/o male with PMH of CABG, DM, HTN, HLD presenting as transfer from [place] with STEMI. Cardiogenic shock s/p [facility] ECMO decannulated . Mitral regurgitation s/p Mitral clip x1 22. Acute respiratory distress/failure s/p Tracheostomy 22. IABP placed (IABP dc'ed ).    Source: [] Patient       [x] EMR        [x] RN        [] Family at bedside       [] Other:    -If unable to interview patient: [x] Trach/Vent/BiPAP  [] Disoriented/confused/inappropriate to interview    Diet Order: Diet, NPO with Tube Feed:   Tube Feeding Modality: Nasogastric  Glucerna 1.2 Johnathon (GLUCERNARTH)  Total Volume for 24 Hours (mL): 1560  Continuous  Starting Tube Feed Rate {mL per Hour}: 65  Increase Tube Feed Rate by (mL): 10     Every hour  Until Goal Tube Feed Rate (mL per Hour): 65  Tube Feed Duration (in Hours): 24  Tube Feed Start Time: 14:25  No Carb Prosource (1pkg = 15gms Protein)     Qty per Day:  2 (22 @ 12:10)    EN Order Provides: 1560mL total volume, 1872kcal, 94g protein, 1256mL free water.  Modular Components: No Carb Prosource x 2 = 120kcal, 30g protein  Current Pump Rate: 65mL/hr  EN provision: (per flow sheets)  5-day EN average: 1502mL/1802kcal or 96% goal volume    Nutrition-related concerns:   - Pt currently tolerating TC this AM, previously on CPAP. Pt remains with NGT.    - Ordered for NPH 7 units q6H and SSI.   - ERIC, previously on CRRT (d/c ), now receiving iHD per Nephrology (daily per flow sheets). Last HD 1/3/23, -2L. Phos elevated - Renvela added.    - Acute pancreatitis on admission with elevated lipase, amylase. Lipase 872 (1/3/22), Amylase 306 (1/3/23). Pt reportedly complaining of abdominal pain per chart. Pt s/p XR Abdomen 23 with non-obstructive gas pattern, "Gas and stool-filled loops of bowel."   - Micronutrient supplementation: multivitamin, thiamine     GI:  Last BM 1/4 x 1, 1/3 x 4.   Bowel Regimen? [x] Yes   [] No   - Off antibiotics since 1/3, on antifungal regimen for fungemia.    Weights:   Daily Weight in k.7 (), Weight in k.7 (), Weight in k (), Weight in k.8 (), Weight in k.5 (), Weight in k.9 (), Weight in k.9 ()   - weight fluctuations noted, pt receiving iHD likely resulting in fluid shifts - will continue to monitor   - at this time, lowest wt in house 80.7kg (1/3) - pt 90.2kg on 12/3/22, down 9.5kg x 1 month, likely a combination of fluid loss as well as periods of inadequate intake.     Drug Dosing Weight  Height (cm): 172.7 (16 Dec 2022 16:25)  Weight (kg): 92.9 (16 Dec 2022 16:25)  BMI (kg/m2): 31.1 (16 Dec 2022 16:25)  BSA (m2): 2.06 (16 Dec 2022 16:25)    MEDICATIONS  (STANDING):  aMIOdarone    Tablet 200 milliGRAM(s) Oral daily  aspirin  chewable 81 milliGRAM(s) Oral daily  atorvastatin 80 milliGRAM(s) Oral at bedtime  caspofungin IVPB 50 milliGRAM(s) IV Intermittent every 24 hours  caspofungin IVPB      chlorhexidine 0.12% Liquid 15 milliLiter(s) Oral Mucosa every 12 hours  chlorhexidine 2% Cloths 1 Application(s) Topical <User Schedule>  dextrose 5%. 1000 milliLiter(s) (50 mL/Hr) IV Continuous <Continuous>  dextrose 50% Injectable 50 milliLiter(s) IV Push every 15 minutes  epoetin teena-epbx (RETACRIT) Injectable 5000 Unit(s) IV Push <User Schedule>  glucagon  Injectable 1 milliGRAM(s) IntraMuscular once  heparin   Injectable 5000 Unit(s) SubCutaneous every 8 hours  insulin lispro (ADMELOG) corrective regimen sliding scale   SubCutaneous every 4 hours  insulin NPH human recombinant 7 Unit(s) SubCutaneous every 6 hours  midodrine 20 milliGRAM(s) Oral every 8 hours  multivitamin/minerals/iron Oral Solution (CENTRUM) 15 milliLiter(s) Oral daily  pantoprazole  Injectable 40 milliGRAM(s) IV Push daily  phenylephrine    Infusion 0.144 MICROgram(s)/kG/Min (5 mL/Hr) IV Continuous <Continuous>  polyethylene glycol 3350 17 Gram(s) Oral daily  senna 2 Tablet(s) Oral at bedtime  sevelamer carbonate Powder 800 milliGRAM(s) Enteral Tube two times a day  thiamine 100 milliGRAM(s) Enteral Tube daily    MEDICATIONS  (PRN):  acetaminophen    Suspension .. 650 milliGRAM(s) Oral every 6 hours PRN Temp greater or equal to 38.5C (101.3F)  dextrose Oral Gel 15 Gram(s) Oral once PRN Blood Glucose LESS THAN 70 milliGRAM(s)/deciliter  simethicone 80 milliGRAM(s) Chew daily PRN Gas  sodium chloride 0.9% lock flush 10 milliLiter(s) IV Push every 1 hour PRN Pre/post blood products, medications, blood draw, and to maintain line patency    Pertinent Labs:  @ 00:27: Na 134<L>, BUN 76<H>, Cr 3.71<H>, <H>, K+ 4.5, Phos 6.3<H>, Mg 2.5, Alk Phos 72, ALT/SGPT 15, AST/SGOT 24, HbA1c --    A1C with Estimated Average Glucose Result: 7.3 % (22 @ 03:58)    Finger Sticks: CAPILLARY BLOOD GLUCOSE  POCT Blood Glucose.: 139 mg/dL (2023 09:41)  POCT Blood Glucose.: 158 mg/dL (2023 05:22)  POCT Blood Glucose.: 161 mg/dL (2023 01:11)  POCT Blood Glucose.: 133 mg/dL (2023 21:03)  POCT Blood Glucose.: 158 mg/dL (2023 17:28)  POCT Blood Glucose.: 145 mg/dL (2023 13:00)  POCT Blood Glucose.: 140 mg/dL (2023 12:02)    Skin per nursing documentation: No noted pressure injuries as per documentation. R groin wound + wound VAC   Edema: 1+ generalized,  2+ dependent; left foot; right foot    Estimated Energy Needs: 2017 - 2421kcal (25-30kcal/kg)  Estimated Protein Needs: 105 - 121g protein (1.3-1.5g/kg)  Defer fluids to team.   Adjusted based on new lowest weight 80.7kg (1/3/23) with consideration for TC, malnutrition, HD    Previous Nutrition Diagnosis: Severe acute malnutrition & Increased nutrient needs  Nutrition Diagnosis is: [x] ongoing  [] resolved [] not applicable     New Nutrition Diagnosis: [x] Not applicable    Nutrition Care Plan:  [x] In Progress - being addressed with EN   [] Achieved  [] Not applicable    Nutrition Interventions:     Education Provided:       [] Yes:  [x] No: N/A at this time.    Recommendations:    ****IN PROGRESS/INCOMPLETE****     Monitoring and Evaluation:   Continue to monitor nutritional intake, tolerance to diet prescription, weights, labs, skin integrity    RD remains available upon request and will follow up per protocol    Mala Han MS, RD, CDN, [facility] Pager #[phone] Nutrition Follow Up Note  Patient seen for: malnutrition follow up.     Chart reviewed, events noted. Pt is a 61 y/o male with PMH of CABG, DM, HTN, HLD presenting as transfer from [place] with STEMI. Cardiogenic shock s/p [facility] ECMO decannulated . Mitral regurgitation s/p Mitral clip x1 22. Acute respiratory distress/failure s/p Tracheostomy 22. IABP placed (IABP dc'ed ).    Source: [] Patient       [x] EMR        [x] RN        [] Family at bedside       [] Other:    -If unable to interview patient: [x] Trach/Vent/BiPAP  [] Disoriented/confused/inappropriate to interview    Diet Order: Diet, NPO with Tube Feed:   Tube Feeding Modality: Nasogastric  Glucerna 1.2 Johnathon (GLUCERNARTH)  Total Volume for 24 Hours (mL): 1560  Continuous  Starting Tube Feed Rate {mL per Hour}: 65  Increase Tube Feed Rate by (mL): 10     Every hour  Until Goal Tube Feed Rate (mL per Hour): 65  Tube Feed Duration (in Hours): 24  Tube Feed Start Time: 14:25  No Carb Prosource (1pkg = 15gms Protein)     Qty per Day:  2 (22 @ 12:10)    EN Order Provides: 1560mL total volume, 1872kcal, 94g protein, 1256mL free water.  Modular Components: No Carb Prosource x 2 = 120kcal, 30g protein  Current Pump Rate: 65mL/hr  EN provision: (per flow sheets)  5-day EN average: 1502mL/1802kcal or 96% goal volume    Nutrition-related concerns:   - Pt currently tolerating TC this AM, previously on CPAP. Pt remains with NGT.    - Ordered for NPH 7 units q6H and SSI.   - ERIC, previously on CRRT (d/c ), now receiving iHD per Nephrology (daily per flow sheets). Last HD 1/3/23, -2L. Phos elevated - Renvela added.    - Acute pancreatitis on admission with elevated lipase, amylase. Lipase 872 (1/3/22), Amylase 306 (1/3/23). Pt reportedly complaining of abdominal pain per chart. Pt s/p XR Abdomen 23 with non-obstructive gas pattern, "Gas and stool-filled loops of bowel."   - Micronutrient supplementation: multivitamin, thiamine     GI:  Last BM 1/4 x 1, 1/3 x 4.   Bowel Regimen? [x] Yes   [] No   - Off antibiotics since 1/3, on antifungal regimen for fungemia.    Weights:   Daily Weight in k.7 (), Weight in k.7 (), Weight in k (), Weight in k.8 (), Weight in k.5 (), Weight in k.9 (), Weight in k.9 ()   - weight fluctuations noted, pt receiving iHD likely resulting in fluid shifts - will continue to monitor   - at this time, lowest wt in house 80.7kg (1/3) - pt 90.2kg on 12/3/22, down 9.5kg x 1 month, likely a combination of fluid loss as well as periods of inadequate intake.     Drug Dosing Weight  Height (cm): 172.7 (16 Dec 2022 16:25)  Weight (kg): 92.9 (16 Dec 2022 16:25)  BMI (kg/m2): 31.1 (16 Dec 2022 16:25)  BSA (m2): 2.06 (16 Dec 2022 16:25)    MEDICATIONS  (STANDING):  aMIOdarone    Tablet 200 milliGRAM(s) Oral daily  aspirin  chewable 81 milliGRAM(s) Oral daily  atorvastatin 80 milliGRAM(s) Oral at bedtime  caspofungin IVPB 50 milliGRAM(s) IV Intermittent every 24 hours  caspofungin IVPB      chlorhexidine 0.12% Liquid 15 milliLiter(s) Oral Mucosa every 12 hours  chlorhexidine 2% Cloths 1 Application(s) Topical <User Schedule>  dextrose 5%. 1000 milliLiter(s) (50 mL/Hr) IV Continuous <Continuous>  dextrose 50% Injectable 50 milliLiter(s) IV Push every 15 minutes  epoetin teena-epbx (RETACRIT) Injectable 5000 Unit(s) IV Push <User Schedule>  glucagon  Injectable 1 milliGRAM(s) IntraMuscular once  heparin   Injectable 5000 Unit(s) SubCutaneous every 8 hours  insulin lispro (ADMELOG) corrective regimen sliding scale   SubCutaneous every 4 hours  insulin NPH human recombinant 7 Unit(s) SubCutaneous every 6 hours  midodrine 20 milliGRAM(s) Oral every 8 hours  multivitamin/minerals/iron Oral Solution (CENTRUM) 15 milliLiter(s) Oral daily  pantoprazole  Injectable 40 milliGRAM(s) IV Push daily  phenylephrine    Infusion 0.144 MICROgram(s)/kG/Min (5 mL/Hr) IV Continuous <Continuous>  polyethylene glycol 3350 17 Gram(s) Oral daily  senna 2 Tablet(s) Oral at bedtime  sevelamer carbonate Powder 800 milliGRAM(s) Enteral Tube two times a day  thiamine 100 milliGRAM(s) Enteral Tube daily    MEDICATIONS  (PRN):  acetaminophen    Suspension .. 650 milliGRAM(s) Oral every 6 hours PRN Temp greater or equal to 38.5C (101.3F)  dextrose Oral Gel 15 Gram(s) Oral once PRN Blood Glucose LESS THAN 70 milliGRAM(s)/deciliter  simethicone 80 milliGRAM(s) Chew daily PRN Gas  sodium chloride 0.9% lock flush 10 milliLiter(s) IV Push every 1 hour PRN Pre/post blood products, medications, blood draw, and to maintain line patency    Pertinent Labs:  @ 00:27: Na 134<L>, BUN 76<H>, Cr 3.71<H>, <H>, K+ 4.5, Phos 6.3<H>, Mg 2.5, Alk Phos 72, ALT/SGPT 15, AST/SGOT 24, HbA1c --    A1C with Estimated Average Glucose Result: 7.3 % (22 @ 03:58)    Finger Sticks: CAPILLARY BLOOD GLUCOSE  POCT Blood Glucose.: 139 mg/dL (2023 09:41)  POCT Blood Glucose.: 158 mg/dL (2023 05:22)  POCT Blood Glucose.: 161 mg/dL (2023 01:11)  POCT Blood Glucose.: 133 mg/dL (2023 21:03)  POCT Blood Glucose.: 158 mg/dL (2023 17:28)  POCT Blood Glucose.: 145 mg/dL (2023 13:00)  POCT Blood Glucose.: 140 mg/dL (2023 12:02)    Skin per nursing documentation: No noted pressure injuries as per documentation. R groin wound + wound VAC   Edema: 1+ generalized,  2+ dependent; left foot; right foot    Estimated Energy Needs: 2017 - 2421kcal (25-30kcal/kg)  Estimated Protein Needs: 105 - 121g protein (1.3-1.5g/kg)  Defer fluids to team.   Adjusted based on new lowest weight 80.7kg (1/3/23) with consideration for TC, malnutrition, HD    Previous Nutrition Diagnosis: Severe acute malnutrition & Increased nutrient needs  Nutrition Diagnosis is: [x] ongoing  [] resolved [] not applicable     New Nutrition Diagnosis: [x] Not applicable    Nutrition Care Plan:  [x] In Progress - being addressed with EN   [] Achieved  [] Not applicable    Nutrition Interventions:     Education Provided:       [] Yes:  [x] No: N/A at this time.    Recommendations:    ****IN PROGRESS/INCOMPLETE****     Monitoring and Evaluation:   Continue to monitor nutritional intake, tolerance to diet prescription, weights, labs, skin integrity    RD remains available upon request and will follow up per protocol    Mala Han MS, RD, CDN, [facility] Pager #[phone] Nutrition Follow Up Note  Patient seen for: malnutrition follow up.     Chart reviewed, events noted. Pt is a 63 y/o male with PMH of CABG, DM, HTN, HLD presenting as transfer from [place] with STEMI. Cardiogenic shock s/p VA ECMO decannulated . Mitral regurgitation s/p Mitral clip x1 22. Acute respiratory distress/failure s/p Tracheostomy 22. IABP placed (IABP dc'ed ).    Source: [] Patient       [x] EMR        [x] RN        [] Family at bedside       [] Other:    -If unable to interview patient: [x] Trach/Vent/BiPAP  [] Disoriented/confused/inappropriate to interview    Diet Order: Diet, NPO with Tube Feed:   Tube Feeding Modality: Nasogastric  Glucerna 1.2 Johnathon (GLUCERNARTH)  Total Volume for 24 Hours (mL): 1560  Continuous  Starting Tube Feed Rate {mL per Hour}: 65  Increase Tube Feed Rate by (mL): 10     Every hour  Until Goal Tube Feed Rate (mL per Hour): 65  Tube Feed Duration (in Hours): 24  Tube Feed Start Time: 14:25  No Carb Prosource (1pkg = 15gms Protein)     Qty per Day:  2 (22 @ 12:10)    EN Order Provides: 1560mL total volume, 1872kcal, 94g protein, 1256mL free water.  Modular Components: No Carb Prosource x 2 = 120kcal, 30g protein  Current Pump Rate: 65mL/hr  EN provision: (per flow sheets)  5-day EN average: 1502mL/1802kcal or 96% goal volume    Nutrition-related concerns:   - Pt currently tolerating TC this AM, previously on CPAP. Pt remains with NGT.    - Ordered for NPH 7 units q6H and SSI.   - ERIC, previously on CRRT (d/c ), now receiving iHD per Nephrology (daily per flow sheets). Last HD 1/3/23, -2L. Phos elevated - Renvela added.    - Acute pancreatitis on admission with elevated lipase, amylase. Lipase 872 (1/3/22), Amylase 306 (1/3/23). Pt reportedly complaining of abdominal pain per chart. Pt s/p XR Abdomen 23 with non-obstructive gas pattern, "Gas and stool-filled loops of bowel."   - Micronutrient supplementation: multivitamin, thiamine     GI:  Last BM 1/4 x 1, 1/3 x 4.   Bowel Regimen? [x] Yes   [] No   - Off antibiotics since 1/3, on antifungal regimen for fungemia.    Weights:   Daily Weight in k.7 (), Weight in k.7 (), Weight in k (), Weight in k.8 (), Weight in k.5 (), Weight in k.9 (), Weight in k.9 ()   - weight fluctuations noted, pt receiving iHD likely resulting in fluid shifts - will continue to monitor   - at this time, lowest wt in house 80.7kg (1/3) - pt 90.2kg on 12/3/22, down 9.5kg x 1 month, likely a combination of fluid loss as well as periods of inadequate intake.     Drug Dosing Weight  Height (cm): 172.7 (16 Dec 2022 16:25)  Weight (kg): 92.9 (16 Dec 2022 16:25)  BMI (kg/m2): 31.1 (16 Dec 2022 16:25)  BSA (m2): 2.06 (16 Dec 2022 16:25)    MEDICATIONS  (STANDING):  aMIOdarone    Tablet 200 milliGRAM(s) Oral daily  aspirin  chewable 81 milliGRAM(s) Oral daily  atorvastatin 80 milliGRAM(s) Oral at bedtime  caspofungin IVPB 50 milliGRAM(s) IV Intermittent every 24 hours  caspofungin IVPB      chlorhexidine 0.12% Liquid 15 milliLiter(s) Oral Mucosa every 12 hours  chlorhexidine 2% Cloths 1 Application(s) Topical <User Schedule>  dextrose 5%. 1000 milliLiter(s) (50 mL/Hr) IV Continuous <Continuous>  dextrose 50% Injectable 50 milliLiter(s) IV Push every 15 minutes  epoetin teena-epbx (RETACRIT) Injectable 5000 Unit(s) IV Push <User Schedule>  glucagon  Injectable 1 milliGRAM(s) IntraMuscular once  heparin   Injectable 5000 Unit(s) SubCutaneous every 8 hours  insulin lispro (ADMELOG) corrective regimen sliding scale   SubCutaneous every 4 hours  insulin NPH human recombinant 7 Unit(s) SubCutaneous every 6 hours  midodrine 20 milliGRAM(s) Oral every 8 hours  multivitamin/minerals/iron Oral Solution (CENTRUM) 15 milliLiter(s) Oral daily  pantoprazole  Injectable 40 milliGRAM(s) IV Push daily  phenylephrine    Infusion 0.144 MICROgram(s)/kG/Min (5 mL/Hr) IV Continuous <Continuous>  polyethylene glycol 3350 17 Gram(s) Oral daily  senna 2 Tablet(s) Oral at bedtime  sevelamer carbonate Powder 800 milliGRAM(s) Enteral Tube two times a day  thiamine 100 milliGRAM(s) Enteral Tube daily    MEDICATIONS  (PRN):  acetaminophen    Suspension .. 650 milliGRAM(s) Oral every 6 hours PRN Temp greater or equal to 38.5C (101.3F)  dextrose Oral Gel 15 Gram(s) Oral once PRN Blood Glucose LESS THAN 70 milliGRAM(s)/deciliter  simethicone 80 milliGRAM(s) Chew daily PRN Gas  sodium chloride 0.9% lock flush 10 milliLiter(s) IV Push every 1 hour PRN Pre/post blood products, medications, blood draw, and to maintain line patency    Pertinent Labs:  @ 00:27: Na 134<L>, BUN 76<H>, Cr 3.71<H>, <H>, K+ 4.5, Phos 6.3<H>, Mg 2.5, Alk Phos 72, ALT/SGPT 15, AST/SGOT 24, HbA1c --    A1C with Estimated Average Glucose Result: 7.3 % (22 @ 03:58)    Finger Sticks: CAPILLARY BLOOD GLUCOSE  POCT Blood Glucose.: 139 mg/dL (2023 09:41)  POCT Blood Glucose.: 158 mg/dL (2023 05:22)  POCT Blood Glucose.: 161 mg/dL (2023 01:11)  POCT Blood Glucose.: 133 mg/dL (2023 21:03)  POCT Blood Glucose.: 158 mg/dL (2023 17:28)  POCT Blood Glucose.: 145 mg/dL (2023 13:00)  POCT Blood Glucose.: 140 mg/dL (2023 12:02)    Skin per nursing documentation: No noted pressure injuries as per documentation. R groin wound + wound VAC   Edema: 1+ generalized,  2+ dependent; left foot; right foot    Estimated Energy Needs: 2017 - 2421kcal (25-30kcal/kg)  Estimated Protein Needs: 105 - 121g protein (1.3-1.5g/kg)  Defer fluids to team.   Adjusted based on new lowest weight 80.7kg (1/3/23) with consideration for TC, malnutrition, HD    Previous Nutrition Diagnosis: Severe acute malnutrition & Increased nutrient needs  Nutrition Diagnosis is: [x] ongoing  [] resolved [] not applicable     New Nutrition Diagnosis: [x] Not applicable    Nutrition Care Plan:  [x] In Progress - being addressed with EN   [] Achieved  [] Not applicable    Nutrition Interventions:     Education Provided:       [] Yes:  [x] No: N/A at this time.    Recommendations:    ****IN PROGRESS/INCOMPLETE****     Monitoring and Evaluation:   Continue to monitor nutritional intake, tolerance to diet prescription, weights, labs, skin integrity    RD remains available upon request and will follow up per protocol    Mala Han MS, RD, CDN, [facility] Pager #[phone] Nutrition Follow Up Note  Patient seen for: malnutrition follow up.     Chart reviewed, events noted. Pt is a 61 y/o male with PMH of CABG, DM, HTN, HLD presenting as transfer from [place] with STEMI. Cardiogenic shock s/p VA ECMO decannulated . Mitral regurgitation s/p Mitral clip x1 22. Acute respiratory distress/failure s/p Tracheostomy 22. IABP placed (IABP dc'ed ).    Source: [] Patient       [x] EMR        [x] RN        [] Family at bedside       [] Other:    -If unable to interview patient: [x] Trach/Vent/BiPAP  [] Disoriented/confused/inappropriate to interview    Diet Order: Diet, NPO with Tube Feed:   Tube Feeding Modality: Nasogastric  Glucerna 1.2 Johnathon (GLUCERNARTH)  Total Volume for 24 Hours (mL): 1560  Continuous  Starting Tube Feed Rate {mL per Hour}: 65  Increase Tube Feed Rate by (mL): 10     Every hour  Until Goal Tube Feed Rate (mL per Hour): 65  Tube Feed Duration (in Hours): 24  Tube Feed Start Time: 14:25  No Carb Prosource (1pkg = 15gms Protein)     Qty per Day:  2 (22 @ 12:10)    EN Order Provides: 1560mL total volume, 1872kcal, 94g protein, 1256mL free water.  Modular Components: No Carb Prosource x 2 = 120kcal, 30g protein  Current Pump Rate: 65mL/hr  EN provision: (per flow sheets)  5-day EN average: 1502mL/1802kcal or 96% goal volume    Nutrition-related concerns:   - Pt currently tolerating TC this AM, previously on CPAP. Pt remains with NGT.    - Ordered for NPH 7 units q6H and SSI.   - ERIC, previously on CRRT (d/c ), now receiving iHD per Nephrology (daily per flow sheets). Last HD 1/3/23, -2L. Phos elevated - Renvela added.    - Acute pancreatitis on admission with elevated lipase, amylase. Lipase 872 (1/3/22), Amylase 306 (1/3/23). Pt reportedly complaining of abdominal pain per chart. Pt s/p XR Abdomen 23 with non-obstructive gas pattern, "Gas and stool-filled loops of bowel."   - Micronutrient supplementation: multivitamin, thiamine     GI:  Last BM 1/4 x 1, 1/3 x 4.   Bowel Regimen? [x] Yes   [] No   - Off antibiotics since 1/3, on antifungal regimen for fungemia.    Weights:   Daily Weight in k.7 (), Weight in k.7 (), Weight in k (), Weight in k.8 (), Weight in k.5 (), Weight in k.9 (), Weight in k.9 ()   - weight fluctuations noted, pt receiving iHD likely resulting in fluid shifts - will continue to monitor   - at this time, lowest wt in house 80.7kg (1/3) - pt 90.2kg on 12/3/22, down 9.5kg x 1 month, likely a combination of fluid loss as well as periods of inadequate intake.     Drug Dosing Weight  Height (cm): 172.7 (16 Dec 2022 16:25)  Weight (kg): 92.9 (16 Dec 2022 16:25)  BMI (kg/m2): 31.1 (16 Dec 2022 16:25)  BSA (m2): 2.06 (16 Dec 2022 16:25)    MEDICATIONS  (STANDING):  aMIOdarone    Tablet 200 milliGRAM(s) Oral daily  aspirin  chewable 81 milliGRAM(s) Oral daily  atorvastatin 80 milliGRAM(s) Oral at bedtime  caspofungin IVPB 50 milliGRAM(s) IV Intermittent every 24 hours  caspofungin IVPB      chlorhexidine 0.12% Liquid 15 milliLiter(s) Oral Mucosa every 12 hours  chlorhexidine 2% Cloths 1 Application(s) Topical <User Schedule>  dextrose 5%. 1000 milliLiter(s) (50 mL/Hr) IV Continuous <Continuous>  dextrose 50% Injectable 50 milliLiter(s) IV Push every 15 minutes  epoetin teena-epbx (RETACRIT) Injectable 5000 Unit(s) IV Push <User Schedule>  glucagon  Injectable 1 milliGRAM(s) IntraMuscular once  heparin   Injectable 5000 Unit(s) SubCutaneous every 8 hours  insulin lispro (ADMELOG) corrective regimen sliding scale   SubCutaneous every 4 hours  insulin NPH human recombinant 7 Unit(s) SubCutaneous every 6 hours  midodrine 20 milliGRAM(s) Oral every 8 hours  multivitamin/minerals/iron Oral Solution (CENTRUM) 15 milliLiter(s) Oral daily  pantoprazole  Injectable 40 milliGRAM(s) IV Push daily  phenylephrine    Infusion 0.144 MICROgram(s)/kG/Min (5 mL/Hr) IV Continuous <Continuous>  polyethylene glycol 3350 17 Gram(s) Oral daily  senna 2 Tablet(s) Oral at bedtime  sevelamer carbonate Powder 800 milliGRAM(s) Enteral Tube two times a day  thiamine 100 milliGRAM(s) Enteral Tube daily    MEDICATIONS  (PRN):  acetaminophen    Suspension .. 650 milliGRAM(s) Oral every 6 hours PRN Temp greater or equal to 38.5C (101.3F)  dextrose Oral Gel 15 Gram(s) Oral once PRN Blood Glucose LESS THAN 70 milliGRAM(s)/deciliter  simethicone 80 milliGRAM(s) Chew daily PRN Gas  sodium chloride 0.9% lock flush 10 milliLiter(s) IV Push every 1 hour PRN Pre/post blood products, medications, blood draw, and to maintain line patency    Pertinent Labs:  @ 00:27: Na 134<L>, BUN 76<H>, Cr 3.71<H>, <H>, K+ 4.5, Phos 6.3<H>, Mg 2.5, Alk Phos 72, ALT/SGPT 15, AST/SGOT 24, HbA1c --    A1C with Estimated Average Glucose Result: 7.3 % (22 @ 03:58)    Finger Sticks: CAPILLARY BLOOD GLUCOSE  POCT Blood Glucose.: 139 mg/dL (2023 09:41)  POCT Blood Glucose.: 158 mg/dL (2023 05:22)  POCT Blood Glucose.: 161 mg/dL (2023 01:11)  POCT Blood Glucose.: 133 mg/dL (2023 21:03)  POCT Blood Glucose.: 158 mg/dL (2023 17:28)  POCT Blood Glucose.: 145 mg/dL (2023 13:00)  POCT Blood Glucose.: 140 mg/dL (2023 12:02)    Skin per nursing documentation: No noted pressure injuries as per documentation. R groin wound + wound VAC   Edema: 1+ generalized,  2+ dependent; left foot; right foot    Estimated Energy Needs: 2017 - 2421kcal (25-30kcal/kg)  Estimated Protein Needs: 105 - 121g protein (1.3-1.5g/kg)  Defer fluids to team.   Adjusted based on new lowest weight 80.7kg (1/3/23) with consideration for TC, malnutrition, HD    Previous Nutrition Diagnosis: Severe acute malnutrition & Increased nutrient needs  Nutrition Diagnosis is: [x] ongoing  [] resolved [] not applicable     New Nutrition Diagnosis: [x] Not applicable    Nutrition Care Plan:  [x] In Progress - being addressed with EN   [] Achieved  [] Not applicable    Nutrition Interventions:     Education Provided:       [] Yes:  [x] No: N/A at this time.    Recommendations:  1. Continue current regimen of Glucerna 1.2 @ goal rate 65mL/hr x 24hr, adjust order to No Carb Prosource TF 2 x daily to provide 1560mL total volume, 1952kcal, 116g protein, 1256mL free water. Regimen meets 24kcal/kg, 1.4g/kg protein based on lowest wt 80.7kg.   -- Free water flushes per team.   -- Monitor weights and adjust goal rate as able.  2. Continue thiamine supplementation, consider change multivitamin to Nephro-Juvencio (no added K or Phos).  3. Monitor GI tolerance to feeds, RD remains available to adjust TF regimen/formulary as needed/upon request.     Monitoring and Evaluation:   Continue to monitor nutritional intake, tolerance to diet prescription, weights, labs, skin integrity    RD remains available upon request and will follow up per protocol    Mala Han MS, RD, CDN, [facility] Pager #[phone] Nutrition Follow Up Note  Patient seen for: malnutrition follow up.     Chart reviewed, events noted. Pt is a 61 y/o male with PMH of CABG, DM, HTN, HLD presenting as transfer from [place] with STEMI. Cardiogenic shock s/p [facility] ECMO decannulated . Mitral regurgitation s/p Mitral clip x1 22. Acute respiratory distress/failure s/p Tracheostomy 22. IABP placed (IABP dc'ed ).    Source: [] Patient       [x] EMR        [x] RN        [] Family at bedside       [] Other:    -If unable to interview patient: [x] Trach/Vent/BiPAP  [] Disoriented/confused/inappropriate to interview    Diet Order: Diet, NPO with Tube Feed:   Tube Feeding Modality: Nasogastric  Glucerna 1.2 Johnathon (GLUCERNARTH)  Total Volume for 24 Hours (mL): 1560  Continuous  Starting Tube Feed Rate {mL per Hour}: 65  Increase Tube Feed Rate by (mL): 10     Every hour  Until Goal Tube Feed Rate (mL per Hour): 65  Tube Feed Duration (in Hours): 24  Tube Feed Start Time: 14:25  No Carb Prosource (1pkg = 15gms Protein)     Qty per Day:  2 (22 @ 12:10)    EN Order Provides: 1560mL total volume, 1872kcal, 94g protein, 1256mL free water.  Modular Components: No Carb Prosource x 2 = 120kcal, 30g protein  Current Pump Rate: 65mL/hr  EN provision: (per flow sheets)  5-day EN average: 1502mL/1802kcal or 96% goal volume    Nutrition-related concerns:   - Pt currently tolerating TC this AM, previously on CPAP. Pt remains with NGT.    - Ordered for NPH 7 units q6H and SSI.   - ERIC, previously on CRRT (d/c ), now receiving iHD per Nephrology (daily per flow sheets). Last HD 1/3/23, -2L. Phos elevated - Renvela added.    - Acute pancreatitis on admission with elevated lipase, amylase. Lipase 872 (1/3/22), Amylase 306 (1/3/23). Pt reportedly complaining of abdominal pain per chart. Pt s/p XR Abdomen 23 with non-obstructive gas pattern, "Gas and stool-filled loops of bowel."   - Micronutrient supplementation: multivitamin, thiamine     GI:  Last BM 1/4 x 1, 1/3 x 4.   Bowel Regimen? [x] Yes   [] No   - Off antibiotics since 1/3, on antifungal regimen for fungemia.    Weights:   Daily Weight in k.7 (), Weight in k.7 (), Weight in k (), Weight in k.8 (), Weight in k.5 (), Weight in k.9 (), Weight in k.9 ()   - weight fluctuations noted, pt receiving iHD likely resulting in fluid shifts - will continue to monitor   - at this time, lowest wt in house 80.7kg (1/3) - pt 90.2kg on 12/3/22, down 9.5kg x 1 month, likely a combination of fluid loss as well as periods of inadequate intake.     Drug Dosing Weight  Height (cm): 172.7 (16 Dec 2022 16:25)  Weight (kg): 92.9 (16 Dec 2022 16:25)  BMI (kg/m2): 31.1 (16 Dec 2022 16:25)  BSA (m2): 2.06 (16 Dec 2022 16:25)    MEDICATIONS  (STANDING):  aMIOdarone    Tablet 200 milliGRAM(s) Oral daily  aspirin  chewable 81 milliGRAM(s) Oral daily  atorvastatin 80 milliGRAM(s) Oral at bedtime  caspofungin IVPB 50 milliGRAM(s) IV Intermittent every 24 hours  caspofungin IVPB      chlorhexidine 0.12% Liquid 15 milliLiter(s) Oral Mucosa every 12 hours  chlorhexidine 2% Cloths 1 Application(s) Topical <User Schedule>  dextrose 5%. 1000 milliLiter(s) (50 mL/Hr) IV Continuous <Continuous>  dextrose 50% Injectable 50 milliLiter(s) IV Push every 15 minutes  epoetin teena-epbx (RETACRIT) Injectable 5000 Unit(s) IV Push <User Schedule>  glucagon  Injectable 1 milliGRAM(s) IntraMuscular once  heparin   Injectable 5000 Unit(s) SubCutaneous every 8 hours  insulin lispro (ADMELOG) corrective regimen sliding scale   SubCutaneous every 4 hours  insulin NPH human recombinant 7 Unit(s) SubCutaneous every 6 hours  midodrine 20 milliGRAM(s) Oral every 8 hours  multivitamin/minerals/iron Oral Solution (CENTRUM) 15 milliLiter(s) Oral daily  pantoprazole  Injectable 40 milliGRAM(s) IV Push daily  phenylephrine    Infusion 0.144 MICROgram(s)/kG/Min (5 mL/Hr) IV Continuous <Continuous>  polyethylene glycol 3350 17 Gram(s) Oral daily  senna 2 Tablet(s) Oral at bedtime  sevelamer carbonate Powder 800 milliGRAM(s) Enteral Tube two times a day  thiamine 100 milliGRAM(s) Enteral Tube daily    MEDICATIONS  (PRN):  acetaminophen    Suspension .. 650 milliGRAM(s) Oral every 6 hours PRN Temp greater or equal to 38.5C (101.3F)  dextrose Oral Gel 15 Gram(s) Oral once PRN Blood Glucose LESS THAN 70 milliGRAM(s)/deciliter  simethicone 80 milliGRAM(s) Chew daily PRN Gas  sodium chloride 0.9% lock flush 10 milliLiter(s) IV Push every 1 hour PRN Pre/post blood products, medications, blood draw, and to maintain line patency    Pertinent Labs:  @ 00:27: Na 134<L>, BUN 76<H>, Cr 3.71<H>, <H>, K+ 4.5, Phos 6.3<H>, Mg 2.5, Alk Phos 72, ALT/SGPT 15, AST/SGOT 24, HbA1c --    A1C with Estimated Average Glucose Result: 7.3 % (22 @ 03:58)    Finger Sticks: CAPILLARY BLOOD GLUCOSE  POCT Blood Glucose.: 139 mg/dL (2023 09:41)  POCT Blood Glucose.: 158 mg/dL (2023 05:22)  POCT Blood Glucose.: 161 mg/dL (2023 01:11)  POCT Blood Glucose.: 133 mg/dL (2023 21:03)  POCT Blood Glucose.: 158 mg/dL (2023 17:28)  POCT Blood Glucose.: 145 mg/dL (2023 13:00)  POCT Blood Glucose.: 140 mg/dL (2023 12:02)    Skin per nursing documentation: No noted pressure injuries as per documentation. R groin wound + wound VAC   Edema: 1+ generalized,  2+ dependent; left foot; right foot    Estimated Energy Needs: 2017 - 2421kcal (25-30kcal/kg)  Estimated Protein Needs: 105 - 121g protein (1.3-1.5g/kg)  Defer fluids to team.   Adjusted based on new lowest weight 80.7kg (1/3/23) with consideration for TC, malnutrition, HD    Previous Nutrition Diagnosis: Severe acute malnutrition & Increased nutrient needs  Nutrition Diagnosis is: [x] ongoing  [] resolved [] not applicable     New Nutrition Diagnosis: [x] Not applicable    Nutrition Care Plan:  [x] In Progress - being addressed with EN   [] Achieved  [] Not applicable    Nutrition Interventions:     Education Provided:       [] Yes:  [x] No: N/A at this time.    Recommendations:  1. Continue current regimen of Glucerna 1.2 @ goal rate 65mL/hr x 24hr, adjust order to No Carb Prosource TF 2 x daily to provide 1560mL total volume, 1952kcal, 116g protein, 1256mL free water. Regimen meets 24kcal/kg, 1.4g/kg protein based on lowest wt 80.7kg.   -- Free water flushes per team.   -- Monitor weights and adjust goal rate as able.  2. Continue thiamine supplementation, consider change multivitamin to Nephro-Juvencio (no added K or Phos).  3. Monitor GI tolerance to feeds, RD remains available to adjust TF regimen/formulary as needed/upon request.     Monitoring and Evaluation:   Continue to monitor nutritional intake, tolerance to diet prescription, weights, labs, skin integrity    RD remains available upon request and will follow up per protocol    Mala Han MS, RD, CDN, [facility] Pager #[phone] Nutrition Follow Up Note  Patient seen for: malnutrition follow up.     Chart reviewed, events noted. Pt is a 61 y/o male with PMH of CABG, DM, HTN, HLD presenting as transfer from [place] with STEMI. Cardiogenic shock s/p [facility] ECMO decannulated . Mitral regurgitation s/p Mitral clip x1 22. Acute respiratory distress/failure s/p Tracheostomy 22. IABP placed (IABP dc'ed ).    Source: [] Patient       [x] EMR        [x] RN        [] Family at bedside       [] Other:    -If unable to interview patient: [x] Trach/Vent/BiPAP  [] Disoriented/confused/inappropriate to interview    Diet Order: Diet, NPO with Tube Feed:   Tube Feeding Modality: Nasogastric  Glucerna 1.2 Johnathon (GLUCERNARTH)  Total Volume for 24 Hours (mL): 1560  Continuous  Starting Tube Feed Rate {mL per Hour}: 65  Increase Tube Feed Rate by (mL): 10     Every hour  Until Goal Tube Feed Rate (mL per Hour): 65  Tube Feed Duration (in Hours): 24  Tube Feed Start Time: 14:25  No Carb Prosource (1pkg = 15gms Protein)     Qty per Day:  2 (22 @ 12:10)    EN Order Provides: 1560mL total volume, 1872kcal, 94g protein, 1256mL free water.  Modular Components: No Carb Prosource x 2 = 120kcal, 30g protein  Current Pump Rate: 65mL/hr  EN provision: (per flow sheets)  5-day EN average: 1502mL/1802kcal or 96% goal volume    Nutrition-related concerns:   - Pt currently tolerating TC this AM, previously on CPAP. Pt remains with NGT.    - Ordered for NPH 7 units q6H and SSI.   - ERIC, previously on CRRT (d/c ), now receiving iHD per Nephrology (daily per flow sheets). Last HD 1/3/23, -2L. Phos elevated - Renvela added.    - Acute pancreatitis on admission with elevated lipase, amylase. Lipase 872 (1/3/22), Amylase 306 (1/3/23). Pt reportedly complaining of abdominal pain per chart. Pt s/p XR Abdomen 23 with non-obstructive gas pattern, "Gas and stool-filled loops of bowel."   - Micronutrient supplementation: multivitamin, thiamine     GI:  Last BM 1/4 x 1, 1/3 x 4.   Bowel Regimen? [x] Yes   [] No   - Off antibiotics since 1/3, on antifungal regimen for fungemia.    Weights:   Daily Weight in k.7 (), Weight in k.7 (), Weight in k (), Weight in k.8 (), Weight in k.5 (), Weight in k.9 (), Weight in k.9 ()   - weight fluctuations noted, pt receiving iHD likely resulting in fluid shifts - will continue to monitor   - at this time, lowest wt in house 80.7kg (1/3) - pt 90.2kg on 12/3/22, down 9.5kg x 1 month, likely a combination of fluid loss as well as periods of inadequate intake.     Drug Dosing Weight  Height (cm): 172.7 (16 Dec 2022 16:25)  Weight (kg): 92.9 (16 Dec 2022 16:25)  BMI (kg/m2): 31.1 (16 Dec 2022 16:25)  BSA (m2): 2.06 (16 Dec 2022 16:25)    MEDICATIONS  (STANDING):  aMIOdarone    Tablet 200 milliGRAM(s) Oral daily  aspirin  chewable 81 milliGRAM(s) Oral daily  atorvastatin 80 milliGRAM(s) Oral at bedtime  caspofungin IVPB 50 milliGRAM(s) IV Intermittent every 24 hours  caspofungin IVPB      chlorhexidine 0.12% Liquid 15 milliLiter(s) Oral Mucosa every 12 hours  chlorhexidine 2% Cloths 1 Application(s) Topical <User Schedule>  dextrose 5%. 1000 milliLiter(s) (50 mL/Hr) IV Continuous <Continuous>  dextrose 50% Injectable 50 milliLiter(s) IV Push every 15 minutes  epoetin teena-epbx (RETACRIT) Injectable 5000 Unit(s) IV Push <User Schedule>  glucagon  Injectable 1 milliGRAM(s) IntraMuscular once  heparin   Injectable 5000 Unit(s) SubCutaneous every 8 hours  insulin lispro (ADMELOG) corrective regimen sliding scale   SubCutaneous every 4 hours  insulin NPH human recombinant 7 Unit(s) SubCutaneous every 6 hours  midodrine 20 milliGRAM(s) Oral every 8 hours  multivitamin/minerals/iron Oral Solution (CENTRUM) 15 milliLiter(s) Oral daily  pantoprazole  Injectable 40 milliGRAM(s) IV Push daily  phenylephrine    Infusion 0.144 MICROgram(s)/kG/Min (5 mL/Hr) IV Continuous <Continuous>  polyethylene glycol 3350 17 Gram(s) Oral daily  senna 2 Tablet(s) Oral at bedtime  sevelamer carbonate Powder 800 milliGRAM(s) Enteral Tube two times a day  thiamine 100 milliGRAM(s) Enteral Tube daily    MEDICATIONS  (PRN):  acetaminophen    Suspension .. 650 milliGRAM(s) Oral every 6 hours PRN Temp greater or equal to 38.5C (101.3F)  dextrose Oral Gel 15 Gram(s) Oral once PRN Blood Glucose LESS THAN 70 milliGRAM(s)/deciliter  simethicone 80 milliGRAM(s) Chew daily PRN Gas  sodium chloride 0.9% lock flush 10 milliLiter(s) IV Push every 1 hour PRN Pre/post blood products, medications, blood draw, and to maintain line patency    Pertinent Labs:  @ 00:27: Na 134<L>, BUN 76<H>, Cr 3.71<H>, <H>, K+ 4.5, Phos 6.3<H>, Mg 2.5, Alk Phos 72, ALT/SGPT 15, AST/SGOT 24, HbA1c --    A1C with Estimated Average Glucose Result: 7.3 % (22 @ 03:58)    Finger Sticks: CAPILLARY BLOOD GLUCOSE  POCT Blood Glucose.: 139 mg/dL (2023 09:41)  POCT Blood Glucose.: 158 mg/dL (2023 05:22)  POCT Blood Glucose.: 161 mg/dL (2023 01:11)  POCT Blood Glucose.: 133 mg/dL (2023 21:03)  POCT Blood Glucose.: 158 mg/dL (2023 17:28)  POCT Blood Glucose.: 145 mg/dL (2023 13:00)  POCT Blood Glucose.: 140 mg/dL (2023 12:02)    Skin per nursing documentation: No noted pressure injuries as per documentation. R groin wound + wound VAC   Edema: 1+ generalized,  2+ dependent; left foot; right foot    Estimated Energy Needs: 2017 - 2421kcal (25-30kcal/kg)  Estimated Protein Needs: 105 - 121g protein (1.3-1.5g/kg)  Defer fluids to team.   Adjusted based on new lowest weight 80.7kg (1/3/23) with consideration for TC, malnutrition, HD    Previous Nutrition Diagnosis: Severe acute malnutrition & Increased nutrient needs  Nutrition Diagnosis is: [x] ongoing  [] resolved [] not applicable     New Nutrition Diagnosis: [x] Not applicable    Nutrition Care Plan:  [x] In Progress - being addressed with EN   [] Achieved  [] Not applicable    Nutrition Interventions:     Education Provided:       [] Yes:  [x] No: N/A at this time.    Recommendations:  1. Continue current regimen of Glucerna 1.2 @ goal rate 65mL/hr x 24hr, adjust order to No Carb Prosource TF 2 x daily to provide 1560mL total volume, 1952kcal, 116g protein, 1256mL free water. Regimen meets 24kcal/kg, 1.4g/kg protein based on lowest wt 80.7kg.   -- Free water flushes per team.   -- Monitor weights and adjust goal rate as able.  2. Continue thiamine supplementation, consider change multivitamin to Nephro-Juvencio (no added K or Phos).  3. Monitor GI tolerance to feeds, RD remains available to adjust TF regimen/formulary as needed/upon request.     Monitoring and Evaluation:   Continue to monitor nutritional intake, tolerance to diet prescription, weights, labs, skin integrity    RD remains available upon request and will follow up per protocol    Mala Han MS, RD, CDN, [facility] Pager #[phone]

## 2023-01-05 LAB
ALBUMIN SERPL ELPH-MCNC: 3.4 G/DL — SIGNIFICANT CHANGE UP (ref 3.3–5)
ALP SERPL-CCNC: 76 U/L — SIGNIFICANT CHANGE UP (ref 40–120)
ALT FLD-CCNC: 14 U/L — SIGNIFICANT CHANGE UP (ref 10–45)
ANION GAP SERPL CALC-SCNC: 16 MMOL/L — SIGNIFICANT CHANGE UP (ref 5–17)
AST SERPL-CCNC: 25 U/L — SIGNIFICANT CHANGE UP (ref 10–40)
BILIRUB SERPL-MCNC: 0.8 MG/DL — SIGNIFICANT CHANGE UP (ref 0.2–1.2)
BUN SERPL-MCNC: 48 MG/DL — HIGH (ref 7–23)
CALCIUM SERPL-MCNC: 9.1 MG/DL — SIGNIFICANT CHANGE UP (ref 8.4–10.5)
CHLORIDE SERPL-SCNC: 98 MMOL/L — SIGNIFICANT CHANGE UP (ref 96–108)
CO2 SERPL-SCNC: 24 MMOL/L — SIGNIFICANT CHANGE UP (ref 22–31)
CREAT SERPL-MCNC: 2.77 MG/DL — HIGH (ref 0.5–1.3)
EGFR: 25 ML/MIN/1.73M2 — LOW
GAS PNL BLDA: SIGNIFICANT CHANGE UP
GLUCOSE BLDC GLUCOMTR-MCNC: 135 MG/DL — HIGH (ref 70–99)
GLUCOSE BLDC GLUCOMTR-MCNC: 139 MG/DL — HIGH (ref 70–99)
GLUCOSE BLDC GLUCOMTR-MCNC: 147 MG/DL — HIGH (ref 70–99)
GLUCOSE BLDC GLUCOMTR-MCNC: 148 MG/DL — HIGH (ref 70–99)
GLUCOSE BLDC GLUCOMTR-MCNC: 155 MG/DL — HIGH (ref 70–99)
GLUCOSE BLDC GLUCOMTR-MCNC: 156 MG/DL — HIGH (ref 70–99)
GLUCOSE BLDC GLUCOMTR-MCNC: 161 MG/DL — HIGH (ref 70–99)
GLUCOSE SERPL-MCNC: 156 MG/DL — HIGH (ref 70–99)
HCT VFR BLD CALC: 30.9 % — LOW (ref 39–50)
HGB BLD-MCNC: 9.7 G/DL — LOW (ref 13–17)
MAGNESIUM SERPL-MCNC: 2.6 MG/DL — SIGNIFICANT CHANGE UP (ref 1.6–2.6)
MCHC RBC-ENTMCNC: 30.1 PG — SIGNIFICANT CHANGE UP (ref 27–34)
MCHC RBC-ENTMCNC: 31.4 GM/DL — LOW (ref 32–36)
MCV RBC AUTO: 96 FL — SIGNIFICANT CHANGE UP (ref 80–100)
NRBC # BLD: 0 /100 WBCS — SIGNIFICANT CHANGE UP (ref 0–0)
PHOSPHATE SERPL-MCNC: 4.3 MG/DL — SIGNIFICANT CHANGE UP (ref 2.5–4.5)
PLATELET # BLD AUTO: 108 K/UL — LOW (ref 150–400)
POTASSIUM SERPL-MCNC: 4.2 MMOL/L — SIGNIFICANT CHANGE UP (ref 3.5–5.3)
POTASSIUM SERPL-SCNC: 4.2 MMOL/L — SIGNIFICANT CHANGE UP (ref 3.5–5.3)
PROT SERPL-MCNC: 7.4 G/DL — SIGNIFICANT CHANGE UP (ref 6–8.3)
RBC # BLD: 3.22 M/UL — LOW (ref 4.2–5.8)
RBC # FLD: 17.6 % — HIGH (ref 10.3–14.5)
SODIUM SERPL-SCNC: 138 MMOL/L — SIGNIFICANT CHANGE UP (ref 135–145)
WBC # BLD: 13.45 K/UL — HIGH (ref 3.8–10.5)
WBC # FLD AUTO: 13.45 K/UL — HIGH (ref 3.8–10.5)

## 2023-01-05 PROCEDURE — 99232 SBSQ HOSP IP/OBS MODERATE 35: CPT

## 2023-01-05 PROCEDURE — 71045 X-RAY EXAM CHEST 1 VIEW: CPT | Mod: 26

## 2023-01-05 PROCEDURE — 99221 1ST HOSP IP/OBS SF/LOW 40: CPT

## 2023-01-05 PROCEDURE — 74018 RADEX ABDOMEN 1 VIEW: CPT | Mod: 26

## 2023-01-05 PROCEDURE — 99233 SBSQ HOSP IP/OBS HIGH 50: CPT | Mod: GC

## 2023-01-05 PROCEDURE — 99291 CRITICAL CARE FIRST HOUR: CPT

## 2023-01-05 RX ORDER — CHLORPROMAZINE HCL 10 MG
25 TABLET ORAL ONCE
Refills: 0 | Status: COMPLETED | OUTPATIENT
Start: 2023-01-05 | End: 2023-01-05

## 2023-01-05 RX ORDER — MIDODRINE HYDROCHLORIDE 2.5 MG/1
10 TABLET ORAL ONCE
Refills: 0 | Status: COMPLETED | OUTPATIENT
Start: 2023-01-05 | End: 2023-01-05

## 2023-01-05 RX ORDER — PHENYLEPHRINE HYDROCHLORIDE 10 MG/ML
0.29 INJECTION INTRAVENOUS
Qty: 40 | Refills: 0 | Status: DISCONTINUED | OUTPATIENT
Start: 2023-01-05 | End: 2023-01-07

## 2023-01-05 RX ORDER — ONDANSETRON 8 MG/1
4 TABLET, FILM COATED ORAL ONCE
Refills: 0 | Status: COMPLETED | OUTPATIENT
Start: 2023-01-05 | End: 2023-01-05

## 2023-01-05 RX ORDER — METOCLOPRAMIDE HCL 10 MG
5 TABLET ORAL EVERY 8 HOURS
Refills: 0 | Status: COMPLETED | OUTPATIENT
Start: 2023-01-05 | End: 2023-01-07

## 2023-01-05 RX ADMIN — Medication 100 MILLIGRAM(S): at 11:48

## 2023-01-05 RX ADMIN — Medication 81 MILLIGRAM(S): at 11:48

## 2023-01-05 RX ADMIN — HEPARIN SODIUM 5000 UNIT(S): 5000 INJECTION INTRAVENOUS; SUBCUTANEOUS at 16:13

## 2023-01-05 RX ADMIN — HEPARIN SODIUM 5000 UNIT(S): 5000 INJECTION INTRAVENOUS; SUBCUTANEOUS at 00:28

## 2023-01-05 RX ADMIN — SENNA PLUS 2 TABLET(S): 8.6 TABLET ORAL at 21:47

## 2023-01-05 RX ADMIN — CASPOFUNGIN ACETATE 260 MILLIGRAM(S): 7 INJECTION, POWDER, LYOPHILIZED, FOR SOLUTION INTRAVENOUS at 18:04

## 2023-01-05 RX ADMIN — CHLORHEXIDINE GLUCONATE 1 APPLICATION(S): 213 SOLUTION TOPICAL at 06:26

## 2023-01-05 RX ADMIN — CHLORHEXIDINE GLUCONATE 15 MILLILITER(S): 213 SOLUTION TOPICAL at 18:04

## 2023-01-05 RX ADMIN — CHLORHEXIDINE GLUCONATE 15 MILLILITER(S): 213 SOLUTION TOPICAL at 05:01

## 2023-01-05 RX ADMIN — MIDODRINE HYDROCHLORIDE 20 MILLIGRAM(S): 2.5 TABLET ORAL at 21:47

## 2023-01-05 RX ADMIN — Medication 15 MILLILITER(S): at 11:48

## 2023-01-05 RX ADMIN — SEVELAMER CARBONATE 800 MILLIGRAM(S): 2400 POWDER, FOR SUSPENSION ORAL at 18:04

## 2023-01-05 RX ADMIN — PANTOPRAZOLE SODIUM 40 MILLIGRAM(S): 20 TABLET, DELAYED RELEASE ORAL at 11:48

## 2023-01-05 RX ADMIN — MIDODRINE HYDROCHLORIDE 20 MILLIGRAM(S): 2.5 TABLET ORAL at 14:01

## 2023-01-05 RX ADMIN — Medication 5 MILLIGRAM(S): at 15:23

## 2023-01-05 RX ADMIN — Medication 102 MILLIGRAM(S): at 16:12

## 2023-01-05 RX ADMIN — ATORVASTATIN CALCIUM 80 MILLIGRAM(S): 80 TABLET, FILM COATED ORAL at 21:47

## 2023-01-05 RX ADMIN — HUMAN INSULIN 7 UNIT(S): 100 INJECTION, SUSPENSION SUBCUTANEOUS at 06:22

## 2023-01-05 RX ADMIN — MIDODRINE HYDROCHLORIDE 20 MILLIGRAM(S): 2.5 TABLET ORAL at 05:01

## 2023-01-05 RX ADMIN — Medication 3: at 09:39

## 2023-01-05 RX ADMIN — HUMAN INSULIN 7 UNIT(S): 100 INJECTION, SUSPENSION SUBCUTANEOUS at 18:05

## 2023-01-05 RX ADMIN — ONDANSETRON 4 MILLIGRAM(S): 8 TABLET, FILM COATED ORAL at 04:05

## 2023-01-05 RX ADMIN — MIDODRINE HYDROCHLORIDE 10 MILLIGRAM(S): 2.5 TABLET ORAL at 16:55

## 2023-01-05 RX ADMIN — HUMAN INSULIN 7 UNIT(S): 100 INJECTION, SUSPENSION SUBCUTANEOUS at 00:39

## 2023-01-05 RX ADMIN — PHENYLEPHRINE HYDROCHLORIDE 10 MICROGRAM(S)/KG/MIN: 10 INJECTION INTRAVENOUS at 21:47

## 2023-01-05 RX ADMIN — Medication 3: at 02:28

## 2023-01-05 RX ADMIN — SIMETHICONE 80 MILLIGRAM(S): 80 TABLET, CHEWABLE ORAL at 00:28

## 2023-01-05 RX ADMIN — Medication 5 MILLIGRAM(S): at 21:47

## 2023-01-05 RX ADMIN — SEVELAMER CARBONATE 800 MILLIGRAM(S): 2400 POWDER, FOR SUSPENSION ORAL at 05:01

## 2023-01-05 RX ADMIN — HUMAN INSULIN 7 UNIT(S): 100 INJECTION, SUSPENSION SUBCUTANEOUS at 11:59

## 2023-01-05 RX ADMIN — HEPARIN SODIUM 5000 UNIT(S): 5000 INJECTION INTRAVENOUS; SUBCUTANEOUS at 09:28

## 2023-01-05 RX ADMIN — Medication 5 MILLIGRAM(S): at 04:40

## 2023-01-05 RX ADMIN — POLYETHYLENE GLYCOL 3350 17 GRAM(S): 17 POWDER, FOR SOLUTION ORAL at 11:49

## 2023-01-05 RX ADMIN — Medication 3: at 21:48

## 2023-01-05 RX ADMIN — AMIODARONE HYDROCHLORIDE 200 MILLIGRAM(S): 400 TABLET ORAL at 05:01

## 2023-01-05 RX ADMIN — Medication 1 ENEMA: at 03:09

## 2023-01-05 NOTE — PROGRESS NOTE ADULT - ATTENDING COMMENTS
#eric  ERIC/ ATN in the setting of STEMI, cardiac arrest & cardiogenic shock  was nonoliguric but now oliguric  requiring HD for UF/HD for volume removal  plan UF today  HD MWF; had hd yesterday  #has neck shiley   #VO/pul edema  pul congestion on xray  UF today  #hypotension-pressors per ct icu  #anemia - on MIGUEL  d/w CT ICU

## 2023-01-05 NOTE — PROGRESS NOTE ADULT - SUBJECTIVE AND OBJECTIVE BOX
[facility] Division of Kidney Diseases & Hypertension  FOLLOW UP NOTE  [phone]--------------------------------------------------------------------------------  Chief Complaint:Acute pancreatitis without infection or necrosis        HPI: 63 y/o male with PMH of CABG, DM, HTN, HLD presenting as transfer from [place] for c/f STEMI. Course c/b gallstone pancreatitis leading to ARDS and shock & cardiac arrest now on [facility] ECMO. Had significant troponin elevation and his LVEF down to 8%. Pt was deemed to be too unstable to have an angiogram and potential PCI due to his co-morbidities & a new subdural bleed. Pt being seen for ERIC. SCr on arrival was 2.15; trended down to hector 1.6 on 11/25 and eventually increased to 3.95 11/28. Pt received IV diuretics as per CTU. Pt initiated on CRRT on 12/5/22 to optimize volume status and electrolytes.  Pt underwent decannulation of ECMO on 12/8/22. Pt was restarted on 12/9/22 for volume overload. Pt underwent mitral clip OR (12/16/22). s/p trach on 12/16       24 hour events/subjective: Patient seen & examined. Labs & vitals reviewed. Remains on TC with FiO2 40% all night. Vomiting x 2 today. Bp stable, off pressors. Last HD session yesterday with 2L UF.  CXR with b/l pulm vasc congestion. UO in the last 24 hours 300cc. Net neg 600cc in 24 hrs          PAST HISTORY  --------------------------------------------------------------------------------  No significant changes to PMH, PSH, FHx, SHx, unless otherwise noted    ALLERGIES & MEDICATIONS  --------------------------------------------------------------------------------  Allergies    No Known Allergies    Intolerances      Standing Inpatient Medications  aMIOdarone    Tablet 200 milliGRAM(s) Oral daily  aspirin  chewable 81 milliGRAM(s) Oral daily  atorvastatin 80 milliGRAM(s) Oral at bedtime  caspofungin IVPB 50 milliGRAM(s) IV Intermittent every 24 hours  caspofungin IVPB      chlorhexidine 0.12% Liquid 15 milliLiter(s) Oral Mucosa every 12 hours  chlorhexidine 2% Cloths 1 Application(s) Topical <User Schedule>  dextrose 5%. 1000 milliLiter(s) IV Continuous <Continuous>  dextrose 50% Injectable 50 milliLiter(s) IV Push every 15 minutes  epoetin teena-epbx (RETACRIT) Injectable 5000 Unit(s) IV Push <User Schedule>  glucagon  Injectable 1 milliGRAM(s) IntraMuscular once  heparin   Injectable 5000 Unit(s) SubCutaneous every 8 hours  insulin lispro (ADMELOG) corrective regimen sliding scale   SubCutaneous every 4 hours  insulin NPH human recombinant 7 Unit(s) SubCutaneous every 6 hours  metoclopramide Injectable 5 milliGRAM(s) IV Push every 8 hours  midodrine 20 milliGRAM(s) Oral every 8 hours  multivitamin/minerals/iron Oral Solution (CENTRUM) 15 milliLiter(s) Oral daily  pantoprazole  Injectable 40 milliGRAM(s) IV Push daily  polyethylene glycol 3350 17 Gram(s) Oral daily  senna 2 Tablet(s) Oral at bedtime  sevelamer carbonate Powder 800 milliGRAM(s) Enteral Tube two times a day  thiamine 100 milliGRAM(s) Enteral Tube daily    PRN Inpatient Medications  acetaminophen    Suspension .. 650 milliGRAM(s) Oral every 6 hours PRN  dextrose Oral Gel 15 Gram(s) Oral once PRN  simethicone 80 milliGRAM(s) Chew daily PRN  sodium chloride 0.9% lock flush 10 milliLiter(s) IV Push every 1 hour PRN      REVIEW OF SYSTEMS  --------------------------------------------------------------------------------        All other systems were reviewed and are negative, except as noted.    VITALS/PHYSICAL EXAM  --------------------------------------------------------------------------------  T(C): 36.9 (01-05-23 @ 08:00), Max: 37.5 (01-04-23 @ 16:00)  HR: 91 (01-05-23 @ 11:00) (83 - 101)  BP: --  RR: 21 (01-05-23 @ 11:00) (12 - 28)  SpO2: 100% (01-05-23 @ 11:00) (93% - 100%)  Wt(kg): --        01-04-23 @ 07:01  -  01-05-23 @ 07:00  --------------------------------------------------------  IN: 1670 mL / OUT: 2317 mL / NET: -647 mL    01-05-23 @ 07:01  -  01-05-23 @ 12:15  --------------------------------------------------------  IN: 260 mL / OUT: 55 mL / NET: 205 mL      Physical Exam:  	Gen: NAD  	HEENT: Anicteric  	Pulm: TC with 40% FiO2  	CV: S1S2+  	Abd: Soft, +BS      	Ext: trace LE edema B/L  	Neuro: Awake  	Skin: Warm and dry  	Vascular access: RIJ non tunneled catheter          LABS/STUDIES  --------------------------------------------------------------------------------              9.7    13.45 >-----------<  108      [01-05-23 @ 00:11]              30.9     138  |  98  |  48  ----------------------------<  156      [01-05-23 @ 00:11]  4.2   |  24  |  2.77        Ca     9.1     [01-05-23 @ 00:11]      Mg     2.6     [01-05-23 @ 00:11]      Phos  4.3     [01-05-23 @ 00:11]    TPro  7.4  /  Alb  3.4  /  TBili  0.8  /  DBili  x   /  AST  25  /  ALT  14  /  AlkPhos  76  [01-05-23 @ 00:11]          Creatinine Trend:  SCr 2.77 [01-05 @ 00:11]  SCr 3.71 [01-04 @ 00:27]  SCr 3.27 [01-03 @ 08:36]  SCr 2.98 [01-03 @ 00:07]  SCr 5.14 [01-02 @ 01:24]        Iron 39, TIBC 222, %sat 18      [12-31-22 @ 06:41]  Ferritin 346      [12-31-22 @ 06:42]    HBsAg Nonreact      [01-04-23 @ 10:02]     [facility] Division of Kidney Diseases & Hypertension  FOLLOW UP NOTE  [phone]--------------------------------------------------------------------------------  Chief Complaint:Acute pancreatitis without infection or necrosis        HPI: 61 y/o male with PMH of CABG, DM, HTN, HLD presenting as transfer from [place] for c/f STEMI. Course c/b gallstone pancreatitis leading to ARDS and shock & cardiac arrest now on [facility] ECMO. Had significant troponin elevation and his LVEF down to 8%. Pt was deemed to be too unstable to have an angiogram and potential PCI due to his co-morbidities & a new subdural bleed. Pt being seen for ERIC. SCr on arrival was 2.15; trended down to hector 1.6 on 11/25 and eventually increased to 3.95 11/28. Pt received IV diuretics as per CTU. Pt initiated on CRRT on 12/5/22 to optimize volume status and electrolytes.  Pt underwent decannulation of ECMO on 12/8/22. Pt was restarted on 12/9/22 for volume overload. Pt underwent mitral clip OR (12/16/22). s/p trach on 12/16       24 hour events/subjective: Patient seen & examined. Labs & vitals reviewed. Remains on TC with FiO2 40% all night. Vomiting x 2 today. Bp stable, off pressors. Last HD session yesterday with 2L UF.  CXR with b/l pulm vasc congestion. UO in the last 24 hours 300cc. Net neg 600cc in 24 hrs          PAST HISTORY  --------------------------------------------------------------------------------  No significant changes to PMH, PSH, FHx, SHx, unless otherwise noted    ALLERGIES & MEDICATIONS  --------------------------------------------------------------------------------  Allergies    No Known Allergies    Intolerances      Standing Inpatient Medications  aMIOdarone    Tablet 200 milliGRAM(s) Oral daily  aspirin  chewable 81 milliGRAM(s) Oral daily  atorvastatin 80 milliGRAM(s) Oral at bedtime  caspofungin IVPB 50 milliGRAM(s) IV Intermittent every 24 hours  caspofungin IVPB      chlorhexidine 0.12% Liquid 15 milliLiter(s) Oral Mucosa every 12 hours  chlorhexidine 2% Cloths 1 Application(s) Topical <User Schedule>  dextrose 5%. 1000 milliLiter(s) IV Continuous <Continuous>  dextrose 50% Injectable 50 milliLiter(s) IV Push every 15 minutes  epoetin teena-epbx (RETACRIT) Injectable 5000 Unit(s) IV Push <User Schedule>  glucagon  Injectable 1 milliGRAM(s) IntraMuscular once  heparin   Injectable 5000 Unit(s) SubCutaneous every 8 hours  insulin lispro (ADMELOG) corrective regimen sliding scale   SubCutaneous every 4 hours  insulin NPH human recombinant 7 Unit(s) SubCutaneous every 6 hours  metoclopramide Injectable 5 milliGRAM(s) IV Push every 8 hours  midodrine 20 milliGRAM(s) Oral every 8 hours  multivitamin/minerals/iron Oral Solution (CENTRUM) 15 milliLiter(s) Oral daily  pantoprazole  Injectable 40 milliGRAM(s) IV Push daily  polyethylene glycol 3350 17 Gram(s) Oral daily  senna 2 Tablet(s) Oral at bedtime  sevelamer carbonate Powder 800 milliGRAM(s) Enteral Tube two times a day  thiamine 100 milliGRAM(s) Enteral Tube daily    PRN Inpatient Medications  acetaminophen    Suspension .. 650 milliGRAM(s) Oral every 6 hours PRN  dextrose Oral Gel 15 Gram(s) Oral once PRN  simethicone 80 milliGRAM(s) Chew daily PRN  sodium chloride 0.9% lock flush 10 milliLiter(s) IV Push every 1 hour PRN      REVIEW OF SYSTEMS  --------------------------------------------------------------------------------        All other systems were reviewed and are negative, except as noted.    VITALS/PHYSICAL EXAM  --------------------------------------------------------------------------------  T(C): 36.9 (01-05-23 @ 08:00), Max: 37.5 (01-04-23 @ 16:00)  HR: 91 (01-05-23 @ 11:00) (83 - 101)  BP: --  RR: 21 (01-05-23 @ 11:00) (12 - 28)  SpO2: 100% (01-05-23 @ 11:00) (93% - 100%)  Wt(kg): --        01-04-23 @ 07:01  -  01-05-23 @ 07:00  --------------------------------------------------------  IN: 1670 mL / OUT: 2317 mL / NET: -647 mL    01-05-23 @ 07:01  -  01-05-23 @ 12:15  --------------------------------------------------------  IN: 260 mL / OUT: 55 mL / NET: 205 mL      Physical Exam:  	Gen: NAD  	HEENT: Anicteric  	Pulm: TC with 40% FiO2  	CV: S1S2+  	Abd: Soft, +BS      	Ext: trace LE edema B/L  	Neuro: Awake  	Skin: Warm and dry  	Vascular access: RIJ non tunneled catheter          LABS/STUDIES  --------------------------------------------------------------------------------              9.7    13.45 >-----------<  108      [01-05-23 @ 00:11]              30.9     138  |  98  |  48  ----------------------------<  156      [01-05-23 @ 00:11]  4.2   |  24  |  2.77        Ca     9.1     [01-05-23 @ 00:11]      Mg     2.6     [01-05-23 @ 00:11]      Phos  4.3     [01-05-23 @ 00:11]    TPro  7.4  /  Alb  3.4  /  TBili  0.8  /  DBili  x   /  AST  25  /  ALT  14  /  AlkPhos  76  [01-05-23 @ 00:11]          Creatinine Trend:  SCr 2.77 [01-05 @ 00:11]  SCr 3.71 [01-04 @ 00:27]  SCr 3.27 [01-03 @ 08:36]  SCr 2.98 [01-03 @ 00:07]  SCr 5.14 [01-02 @ 01:24]        Iron 39, TIBC 222, %sat 18      [12-31-22 @ 06:41]  Ferritin 346      [12-31-22 @ 06:42]    HBsAg Nonreact      [01-04-23 @ 10:02]     [facility] Division of Kidney Diseases & Hypertension  FOLLOW UP NOTE  [phone]--------------------------------------------------------------------------------  Chief Complaint:Acute pancreatitis without infection or necrosis        HPI: 63 y/o male with PMH of CABG, DM, HTN, HLD presenting as transfer from [place] for c/f STEMI. Course c/b gallstone pancreatitis leading to ARDS and shock & cardiac arrest now on [facility] ECMO. Had significant troponin elevation and his LVEF down to 8%. Pt was deemed to be too unstable to have an angiogram and potential PCI due to his co-morbidities & a new subdural bleed. Pt being seen for ERIC. SCr on arrival was 2.15; trended down to hector 1.6 on 11/25 and eventually increased to 3.95 11/28. Pt received IV diuretics as per CTU. Pt initiated on CRRT on 12/5/22 to optimize volume status and electrolytes.  Pt underwent decannulation of ECMO on 12/8/22. Pt was restarted on 12/9/22 for volume overload. Pt underwent mitral clip OR (12/16/22). s/p trach on 12/16       24 hour events/subjective: Patient seen & examined. Labs & vitals reviewed. Remains on TC with FiO2 40% all night. Vomiting x 2 today. Bp stable, off pressors. Last HD session yesterday with 2L UF.  CXR with b/l pulm vasc congestion. UO in the last 24 hours 300cc. Net neg 600cc in 24 hrs          PAST HISTORY  --------------------------------------------------------------------------------  No significant changes to PMH, PSH, FHx, SHx, unless otherwise noted    ALLERGIES & MEDICATIONS  --------------------------------------------------------------------------------  Allergies    No Known Allergies    Intolerances      Standing Inpatient Medications  aMIOdarone    Tablet 200 milliGRAM(s) Oral daily  aspirin  chewable 81 milliGRAM(s) Oral daily  atorvastatin 80 milliGRAM(s) Oral at bedtime  caspofungin IVPB 50 milliGRAM(s) IV Intermittent every 24 hours  caspofungin IVPB      chlorhexidine 0.12% Liquid 15 milliLiter(s) Oral Mucosa every 12 hours  chlorhexidine 2% Cloths 1 Application(s) Topical <User Schedule>  dextrose 5%. 1000 milliLiter(s) IV Continuous <Continuous>  dextrose 50% Injectable 50 milliLiter(s) IV Push every 15 minutes  epoetin teena-epbx (RETACRIT) Injectable 5000 Unit(s) IV Push <User Schedule>  glucagon  Injectable 1 milliGRAM(s) IntraMuscular once  heparin   Injectable 5000 Unit(s) SubCutaneous every 8 hours  insulin lispro (ADMELOG) corrective regimen sliding scale   SubCutaneous every 4 hours  insulin NPH human recombinant 7 Unit(s) SubCutaneous every 6 hours  metoclopramide Injectable 5 milliGRAM(s) IV Push every 8 hours  midodrine 20 milliGRAM(s) Oral every 8 hours  multivitamin/minerals/iron Oral Solution (CENTRUM) 15 milliLiter(s) Oral daily  pantoprazole  Injectable 40 milliGRAM(s) IV Push daily  polyethylene glycol 3350 17 Gram(s) Oral daily  senna 2 Tablet(s) Oral at bedtime  sevelamer carbonate Powder 800 milliGRAM(s) Enteral Tube two times a day  thiamine 100 milliGRAM(s) Enteral Tube daily    PRN Inpatient Medications  acetaminophen    Suspension .. 650 milliGRAM(s) Oral every 6 hours PRN  dextrose Oral Gel 15 Gram(s) Oral once PRN  simethicone 80 milliGRAM(s) Chew daily PRN  sodium chloride 0.9% lock flush 10 milliLiter(s) IV Push every 1 hour PRN      REVIEW OF SYSTEMS  --------------------------------------------------------------------------------        All other systems were reviewed and are negative, except as noted.    VITALS/PHYSICAL EXAM  --------------------------------------------------------------------------------  T(C): 36.9 (01-05-23 @ 08:00), Max: 37.5 (01-04-23 @ 16:00)  HR: 91 (01-05-23 @ 11:00) (83 - 101)  BP: --  RR: 21 (01-05-23 @ 11:00) (12 - 28)  SpO2: 100% (01-05-23 @ 11:00) (93% - 100%)  Wt(kg): --        01-04-23 @ 07:01  -  01-05-23 @ 07:00  --------------------------------------------------------  IN: 1670 mL / OUT: 2317 mL / NET: -647 mL    01-05-23 @ 07:01  -  01-05-23 @ 12:15  --------------------------------------------------------  IN: 260 mL / OUT: 55 mL / NET: 205 mL      Physical Exam:  	Gen: NAD  	HEENT: Anicteric  	Pulm: TC with 40% FiO2  	CV: S1S2+  	Abd: Soft, +BS      	Ext: trace LE edema B/L  	Neuro: Awake  	Skin: Warm and dry  	Vascular access: RIJ non tunneled catheter          LABS/STUDIES  --------------------------------------------------------------------------------              9.7    13.45 >-----------<  108      [01-05-23 @ 00:11]              30.9     138  |  98  |  48  ----------------------------<  156      [01-05-23 @ 00:11]  4.2   |  24  |  2.77        Ca     9.1     [01-05-23 @ 00:11]      Mg     2.6     [01-05-23 @ 00:11]      Phos  4.3     [01-05-23 @ 00:11]    TPro  7.4  /  Alb  3.4  /  TBili  0.8  /  DBili  x   /  AST  25  /  ALT  14  /  AlkPhos  76  [01-05-23 @ 00:11]          Creatinine Trend:  SCr 2.77 [01-05 @ 00:11]  SCr 3.71 [01-04 @ 00:27]  SCr 3.27 [01-03 @ 08:36]  SCr 2.98 [01-03 @ 00:07]  SCr 5.14 [01-02 @ 01:24]        Iron 39, TIBC 222, %sat 18      [12-31-22 @ 06:41]  Ferritin 346      [12-31-22 @ 06:42]    HBsAg Nonreact      [01-04-23 @ 10:02]

## 2023-01-05 NOTE — PROGRESS NOTE ADULT - ASSESSMENT
62M CAD s/p CABG, DM, HTN, presented as a trasnfer from [place] for STEMI, found to have pancreatitis not necrotizing and cholecystitis on CT AP, admitted to to SICU for respiratory failure due to ARDS. Moved to the CTU for ECMO for cardiogenic shock vs ARDS, ECMO decannulated on 12/8 with IABP placement and mitral clip and removed on 12/17, s/p trac on 12/16/22, now on trach collar.    #Candidemia  Unclear source  Bcx positive for candida tropicalis on 12/26/22  Repeat with no growth to date  Limited TTE shows no evidence for valvular vegetations, mitral clip in place    #ESRD on HD  Nephrology following    shiley in place      Continue caspofungin 50mg daily   The JUAN of organism is on the high side against diflucan    isolated CNS in BC is a contaminate  eye exam down the line if needed      Follow repeat blood cultures - negative so far    needs at least two full weeks of caspo    chest xray looks worse but no other signs of pna and sputum unimpressive  suspect PVC  to follow                                62M CAD s/p CABG, DM, HTN, presented as a trasnfer from [place] for STEMI, found to have pancreatitis not necrotizing and cholecystitis on CT AP, admitted to to SICU for respiratory failure due to ARDS. Moved to the CTU for ECMO for cardiogenic shock vs ARDS, ECMO decannulated on 12/8 with IABP placement and mitral clip and removed on 12/17, s/p trac on 12/16/22, now on trach collar.    #Candidemia  Unclear source  Bcx positive for candida tropicalis on 12/26/22  Repeat with no growth to date  Limited TTE shows no evidence for valvular vegetations, mitral clip in place    #ESRD on HD  Nephrology following    shiley in place      Continue caspofungin 50mg daily   The JUAN of organism is on the high side against diflucan    isolated CNS in BC is a contaminate  eye exam down the line if needed      Follow repeat blood cultures - negative so far    needs at least two full weeks of caspo    chest xray looks worse but no other signs of pna and sputum unimpressive  suspect PVC  to follow                                62M CAD s/p CABG, DM, HTN, presented as a trasnfer from [place] for STEMI, found to have pancreatitis not necrotizing and cholecystitis on CT AP, admitted to to SICU for respiratory failure due to ARDS. Moved to the CTU for ECMO for cardiogenic shock vs ARDS, ECMO decannulated on 12/8 with IABP placement and mitral clip and removed on 12/17, s/p trac on 12/16/22, now on trach collar.    #Candidemia  Unclear source  Bcx positive for candida tropicalis on 12/26/22  Repeat with no growth to date  Limited TTE shows no evidence for valvular vegetations, mitral clip in place    #ESRD on HD  Nephrology following    shiley in place      Continue caspofungin 50mg daily   The JUAN of organism is on the high side against diflucan    isolated CNS in BC is a contaminate  eye exam down the line if needed      Follow repeat blood cultures - negative so far    needs at least two full weeks of caspo    chest xray looks worse but no other signs of pna and sputum unimpressive  suspect PVC  to follow

## 2023-01-05 NOTE — PROGRESS NOTE ADULT - PROBLEM SELECTOR PLAN 1
Pt with non oliguric ERIC/ ATN in the setting of STEMI, cardiac arrest & cardiogenic shock  SCr on arrival was 2.15; trended down to hector 1.6 on 11/25; slowly trended up & increased to 3.95 11/28. Pt initiated on CRRT/CVVHDF to optimize volume and electrolytes on 12/5/22. Pt likely with ATN. Pt underwent decannulation of ECMO on 12/8/22 and was taken off CRRT.     Pt reinitiated on CRRT overnight on 12/9/22 for volume overload. s/p trach on 12/16. CRRT stopped again 12/19. Bladder scan daily. Now with [facility]. Off Lasix gtt.     Getting daily HD/UF. Remains on TC with FiO2 40%.  Last HD session yesterday with 2L UF.  CXR with b/l pulm vasc congestion. Will do 1.5L UF session today.   Will need tunneled catheter placed for ongoing dialysis needs once cleared from infectious standpoint.   Continue to monitor for renal recovery.    Monitor labs and urine output. Avoid any potential nephrotoxins. Dose medications as per HD.

## 2023-01-05 NOTE — PROGRESS NOTE ADULT - SUBJECTIVE AND OBJECTIVE BOX
Patient seen and examined at the bedside.    Remained critically ill on continuous ICU monitoring.    Brief Summary:  61 y/o male with PMH of CABG, DM, HTN, HLD presenting with a STEMI.   He was in cardiogenic shock requiring VA ECMO, respiratory failure requiring tracheostomy, and renal failure requiring RRT.    24 Hour events:      OBJECTIVE:  Vital Signs Last 24 Hrs  T(C): 36.8 (05 Jan 2023 04:00), Max: 37.5 (04 Jan 2023 16:00)  T(F): 98.2 (05 Jan 2023 04:00), Max: 99.5 (04 Jan 2023 16:00)  HR: 95 (05 Jan 2023 07:00) (83 - 101)  BP: --  BP(mean): --  RR: 12 (05 Jan 2023 07:00) (12 - 28)  SpO2: 100% (05 Jan 2023 07:00) (100% - 100%)    Parameters below as of 05 Jan 2023 06:00  Patient On (Oxygen Delivery Method): tracheostomy collar    O2 Concentration (%): 40    Physical Exam:   General: Sitting in chair, no acute distress   Neurology: Following commands, alert and interactive  Eyes: Bilateral pupils reactive   ENT/Neck: Trach collar  Respiratory: Breath sounds bilaterally  CV: Sinus Rhythm  Abdominal: Soft, nontender  Extremities: Warm            -------------------------------------------------------------------------------------------------------------------------------  Labs:                        9.7    13.45 )-----------( 108      ( 05 Jan 2023 00:11 )             30.9     01-05    138  |  98  |  48<H>  ----------------------------<  156<H>  4.2   |  24  |  2.77<H>    Ca    9.1      05 Jan 2023 00:11  Phos  4.3     01-05  Mg     2.6     01-05    TPro  7.4  /  Alb  3.4  /  TBili  0.8  /  DBili  x   /  AST  25  /  ALT  14  /  AlkPhos  76  01-05    LIVER FUNCTIONS - ( 05 Jan 2023 00:11 )  Alb: 3.4 g/dL / Pro: 7.4 g/dL / ALK PHOS: 76 U/L / ALT: 14 U/L / AST: 25 U/L / GGT: x             ABG - ( 05 Jan 2023 00:00 )  pH, Arterial: 7.45  pH, Blood: x     /  pCO2: 41    /  pO2: 89    / HCO3: 28    / Base Excess: 4.1   /  SaO2: 98.0    ------------------------------------------------------------------------------------------------------------------------------  Assessment:  61 y/o M admitted with a STEMI and cardiogenic shock.     Cardiogenic shock s/p VA ECMO decannulated 12/8  Mitral regurgitation s/p Mitral clip x1 12/16/22  Acute respiratory distress/failure s/p Tracheostomy 12/16/22   At high risk for hemodynamic instability  ERIC/Renal failure  Fungemia  Anemia  Thrombocytopenia     Plan:   ***Neuro***  PT ongoing.  Pain control with prns  Optimize day/night cycles.    ***Cardiovascular***  At high risk for hemodynamic instability and cardiac arrhythmias.  Midodrine to support blood pressure.  Continue Amiodarone  ASA /Statin daily    ***Pulmonary***  Respiratory failure s/p tracheostomy  Long trach collar trials - gradually extending - plan for 24 hours.  Deep breathing and coughing exercises.  Wean oxygen as able.    ***GI***  Protein calorie malnutrition - Tube feeds via nasal feeding tube  Protonix for GI prophylaxis   Reglan for gut motility   Bowel regimen with Miralax and Senna  Simethicone PRN gas     ***Renal***  Renal failure - HD again today with volume removal.  Replete electrolytes as indicated.  Continue with Retacrit     ***ID***  Candidemia - On Caspofungin  Most recent cultures now with staph epi, likely contaminant, no further Vancomycin.    ***Endocrine***  DM with hyperglycemia - Insulin sliding scale, NPH.    ***Hematology***  Acute blood loss anemia and thrombocytopenia- no transfusion indication currently, will trend.  SQ Heparin for VTE prophylaxis        Patient requires continuous monitoring with bedside rhythm monitoring, pulse oximetry monitoring, and continuous central venous and arterial pressure monitoring; and intermittent blood gas analysis. Care plan discussed with the ICU care team.   Patient remained critical, at risk for life threatening decompensation.    I have spent 30 minutes providing critical care management to this patient.    By signing my name below, I, Rosio Alonzo, attest that this documentation has been prepared under the direction and in the presence of Jade Rosas MD  Electronically signed: Brian Burnett, 01-05-23 @ 07:56    I, Jade Rosas MD, personally performed the services described in this documentation. all medical record entries made by the scribe were at my direction and in my presence. I have reviewed the chart and agree that the record reflects my personal performance and is accurate and complete  Electronically signed: Jade Rosas MD Patient seen and examined at the bedside.    Remained critically ill on continuous ICU monitoring.    Brief Summary:  63 y/o male with PMH of CABG, DM, HTN, HLD presenting with a STEMI.   He was in cardiogenic shock requiring VA ECMO, respiratory failure requiring tracheostomy, and renal failure requiring RRT.    24 Hour events:      OBJECTIVE:  Vital Signs Last 24 Hrs  T(C): 36.8 (05 Jan 2023 04:00), Max: 37.5 (04 Jan 2023 16:00)  T(F): 98.2 (05 Jan 2023 04:00), Max: 99.5 (04 Jan 2023 16:00)  HR: 95 (05 Jan 2023 07:00) (83 - 101)  BP: --  BP(mean): --  RR: 12 (05 Jan 2023 07:00) (12 - 28)  SpO2: 100% (05 Jan 2023 07:00) (100% - 100%)    Parameters below as of 05 Jan 2023 06:00  Patient On (Oxygen Delivery Method): tracheostomy collar    O2 Concentration (%): 40    Physical Exam:   General: Sitting in chair, no acute distress   Neurology: Following commands, alert and interactive  Eyes: Bilateral pupils reactive   ENT/Neck: Trach collar  Respiratory: Breath sounds bilaterally  CV: Sinus Rhythm  Abdominal: Soft, nontender  Extremities: Warm            -------------------------------------------------------------------------------------------------------------------------------  Labs:                        9.7    13.45 )-----------( 108      ( 05 Jan 2023 00:11 )             30.9     01-05    138  |  98  |  48<H>  ----------------------------<  156<H>  4.2   |  24  |  2.77<H>    Ca    9.1      05 Jan 2023 00:11  Phos  4.3     01-05  Mg     2.6     01-05    TPro  7.4  /  Alb  3.4  /  TBili  0.8  /  DBili  x   /  AST  25  /  ALT  14  /  AlkPhos  76  01-05    LIVER FUNCTIONS - ( 05 Jan 2023 00:11 )  Alb: 3.4 g/dL / Pro: 7.4 g/dL / ALK PHOS: 76 U/L / ALT: 14 U/L / AST: 25 U/L / GGT: x             ABG - ( 05 Jan 2023 00:00 )  pH, Arterial: 7.45  pH, Blood: x     /  pCO2: 41    /  pO2: 89    / HCO3: 28    / Base Excess: 4.1   /  SaO2: 98.0    ------------------------------------------------------------------------------------------------------------------------------  Assessment:  63 y/o M admitted with a STEMI and cardiogenic shock.     Cardiogenic shock s/p VA ECMO decannulated 12/8  Mitral regurgitation s/p Mitral clip x1 12/16/22  Acute respiratory distress/failure s/p Tracheostomy 12/16/22   At high risk for hemodynamic instability  ERIC/Renal failure  Fungemia  Anemia  Thrombocytopenia     Plan:   ***Neuro***  PT ongoing.  Pain control with prns  Optimize day/night cycles.    ***Cardiovascular***  At high risk for hemodynamic instability and cardiac arrhythmias.  Midodrine to support blood pressure.  Continue Amiodarone  ASA /Statin daily    ***Pulmonary***  Respiratory failure s/p tracheostomy  Long trach collar trials - gradually extending - plan for 24 hours.  Deep breathing and coughing exercises.  Wean oxygen as able.    ***GI***  Protein calorie malnutrition - Tube feeds via nasal feeding tube  Protonix for GI prophylaxis   Reglan for gut motility   Bowel regimen with Miralax and Senna  Simethicone PRN gas     ***Renal***  Renal failure - HD again today with volume removal.  Replete electrolytes as indicated.  Continue with Retacrit     ***ID***  Candidemia - On Caspofungin  Most recent cultures now with staph epi, likely contaminant, no further Vancomycin.    ***Endocrine***  DM with hyperglycemia - Insulin sliding scale, NPH.    ***Hematology***  Acute blood loss anemia and thrombocytopenia- no transfusion indication currently, will trend.  SQ Heparin for VTE prophylaxis        Patient requires continuous monitoring with bedside rhythm monitoring, pulse oximetry monitoring, and continuous central venous and arterial pressure monitoring; and intermittent blood gas analysis. Care plan discussed with the ICU care team.   Patient remained critical, at risk for life threatening decompensation.    I have spent 30 minutes providing critical care management to this patient.    By signing my name below, I, Rosio Alonzo, attest that this documentation has been prepared under the direction and in the presence of Jade Rosas MD  Electronically signed: Brian Burnett, 01-05-23 @ 07:56    I, Jade Rosas MD, personally performed the services described in this documentation. all medical record entries made by the scribe were at my direction and in my presence. I have reviewed the chart and agree that the record reflects my personal performance and is accurate and complete  Electronically signed: Jade Rosas MD Patient seen and examined at the bedside.    Remained critically ill on continuous ICU monitoring.    Brief Summary:  63 y/o male with PMH of CABG, DM, HTN, HLD presenting with a STEMI.   He was in cardiogenic shock requiring VA ECMO, respiratory failure requiring tracheostomy, and renal failure requiring RRT.    24 Hour events:  - Continue TC as tolerated   - Patient was OOBTC     OBJECTIVE:  Vital Signs Last 24 Hrs  T(C): 36.8 (05 Jan 2023 04:00), Max: 37.5 (04 Jan 2023 16:00)  T(F): 98.2 (05 Jan 2023 04:00), Max: 99.5 (04 Jan 2023 16:00)  HR: 95 (05 Jan 2023 07:00) (83 - 101)  BP: --  BP(mean): --  RR: 12 (05 Jan 2023 07:00) (12 - 28)  SpO2: 100% (05 Jan 2023 07:00) (100% - 100%)    Parameters below as of 05 Jan 2023 06:00  Patient On (Oxygen Delivery Method): tracheostomy collar    O2 Concentration (%): 40    Physical Exam:   General: Sitting in chair, no acute distress   Neurology: Following commands, alert and interactive  Eyes: Bilateral pupils reactive   ENT/Neck: Trach collar  Respiratory: Breath sounds bilaterally  CV: Sinus Rhythm  Abdominal: Soft, nontender  Extremities: Warm            -------------------------------------------------------------------------------------------------------------------------------  Labs:                        9.7    13.45 )-----------( 108      ( 05 Jan 2023 00:11 )             30.9     01-05    138  |  98  |  48<H>  ----------------------------<  156<H>  4.2   |  24  |  2.77<H>    Ca    9.1      05 Jan 2023 00:11  Phos  4.3     01-05  Mg     2.6     01-05    TPro  7.4  /  Alb  3.4  /  TBili  0.8  /  DBili  x   /  AST  25  /  ALT  14  /  AlkPhos  76  01-05    LIVER FUNCTIONS - ( 05 Jan 2023 00:11 )  Alb: 3.4 g/dL / Pro: 7.4 g/dL / ALK PHOS: 76 U/L / ALT: 14 U/L / AST: 25 U/L / GGT: x             ABG - ( 05 Jan 2023 00:00 )  pH, Arterial: 7.45  pH, Blood: x     /  pCO2: 41    /  pO2: 89    / HCO3: 28    / Base Excess: 4.1   /  SaO2: 98.0    ------------------------------------------------------------------------------------------------------------------------------  Assessment:  63 y/o M admitted with a STEMI and cardiogenic shock.     Cardiogenic shock s/p VA ECMO decannulated 12/8  Mitral regurgitation s/p Mitral clip x1 12/16/22  Acute respiratory distress/failure s/p Tracheostomy 12/16/22   At high risk for hemodynamic instability  ERIC/Renal failure  Fungemia  Anemia  Thrombocytopenia     Plan:   ***Neuro***  PT ongoing.  Pain control with prns  Optimize day/night cycles.    ***Cardiovascular***  At high risk for hemodynamic instability and cardiac arrhythmias.  Midodrine to support blood pressure.  Continue Amiodarone  ASA /Statin daily    ***Pulmonary***  Respiratory failure s/p tracheostomy  Long trach collar trials - gradually extending - plan for 24 hours.  Deep breathing and coughing exercises.  Wean oxygen as able.    ***GI***  Protein calorie malnutrition - Tube feeds via nasal feeding tube  Protonix for GI prophylaxis   Reglan for gut motility   Bowel regimen with Miralax and Senna  Simethicone PRN gas     ***Renal***  Renal failure - HD again today with volume removal.  Replete electrolytes as indicated.  Continue with Retacrit     ***ID***  Candidemia - On Caspofungin  Most recent cultures now with staph epi, likely contaminant, no further Vancomycin.    ***Endocrine***  DM with hyperglycemia - Insulin sliding scale, NPH.    ***Hematology***  Acute blood loss anemia and thrombocytopenia- no transfusion indication currently, will trend.  SQ Heparin for VTE prophylaxis        Patient requires continuous monitoring with bedside rhythm monitoring, pulse oximetry monitoring, and continuous central venous and arterial pressure monitoring; and intermittent blood gas analysis. Care plan discussed with the ICU care team.   Patient remained critical, at risk for life threatening decompensation.    I have spent 30 minutes providing critical care management to this patient.    By signing my name below, I, Rosio Alonzo, attest that this documentation has been prepared under the direction and in the presence of Jade Rosas MD  Electronically signed: Brian Burnett, 01-05-23 @ 07:56    I, Jade Rosas MD, personally performed the services described in this documentation. all medical record entries made by the scribe were at my direction and in my presence. I have reviewed the chart and agree that the record reflects my personal performance and is accurate and complete  Electronically signed: Jade Rosas MD Patient seen and examined at the bedside.    Remains critically ill on continuous ICU monitoring.    Brief Summary:  61 y/o male with PMH of CABG, DM, HTN, HLD presenting with a STEMI.   He was in cardiogenic shock requiring VA ECMO, respiratory failure requiring tracheostomy, and renal failure requiring RRT.    24 Hour events:  - Continued trach collar for 24 hours.  - Tolerated HD yesterday without pressor support, planning for another session of UF today.      Objective:  Vital Signs Last 24 Hrs  T(C): 36.8 (05 Jan 2023 04:00), Max: 37.5 (04 Jan 2023 16:00)  T(F): 98.2 (05 Jan 2023 04:00), Max: 99.5 (04 Jan 2023 16:00)  HR: 95 (05 Jan 2023 07:00) (83 - 101)  BP: --  BP(mean): --  RR: 12 (05 Jan 2023 07:00) (12 - 28)  SpO2: 100% (05 Jan 2023 07:00) (100% - 100%)    Parameters below as of 05 Jan 2023 06:00  Patient On (Oxygen Delivery Method): tracheostomy collar    O2 Concentration (%): 40    Physical Exam:   General: Sitting in chair, no acute distress   Neurology: Following commands, alert and interactive  Eyes: Bilateral pupils reactive   ENT/Neck: Trach collar  Respiratory: Breath sounds bilaterally  CV: Sinus Rhythm  Abdominal: Soft, nontender  Extremities: Warm              -------------------------------------------------------------------------------------------------------------------------------  Labs:                        9.7    13.45 )-----------( 108      ( 05 Jan 2023 00:11 )             30.9     01-05    138  |  98  |  48<H>  ----------------------------<  156<H>  4.2   |  24  |  2.77<H>    Ca    9.1      05 Jan 2023 00:11  Phos  4.3     01-05  Mg     2.6     01-05    TPro  7.4  /  Alb  3.4  /  TBili  0.8  /  DBili  x   /  AST  25  /  ALT  14  /  AlkPhos  76  01-05    LIVER FUNCTIONS - ( 05 Jan 2023 00:11 )  Alb: 3.4 g/dL / Pro: 7.4 g/dL / ALK PHOS: 76 U/L / ALT: 14 U/L / AST: 25 U/L / GGT: x             ABG - ( 05 Jan 2023 00:00 )  pH, Arterial: 7.45  pH, Blood: x     /  pCO2: 41    /  pO2: 89    / HCO3: 28    / Base Excess: 4.1   /  SaO2: 98.0    ------------------------------------------------------------------------------------------------------------------------------  Assessment:  61 y/o M admitted with a STEMI and cardiogenic shock.     Cardiogenic shock s/p VA ECMO decannulated 12/8  Mitral regurgitation s/p Mitral clip x1 12/16/22  Acute respiratory distress/failure s/p Tracheostomy 12/16/22   At high risk for hemodynamic instability  ERIC/Renal failure  Fungemia  Anemia      Plan:   ***Neuro***  PT ongoing.  Pain control with prns  Optimize day/night cycles.    ***Cardiovascular***  At high risk for hemodynamic instability and cardiac arrhythmias.  Midodrine to support blood pressure.  Continue Amiodarone  ASA /Statin daily    ***Pulmonary***  Respiratory failure s/p tracheostomy  Trach collar as tolerated.  Deep breathing and coughing exercises.  Wean oxygen as able.    ***GI***  Protein calorie malnutrition - Tube feeds via nasal feeding tube  Protonix for GI prophylaxis   Bowel regimen with Miralax and Senna and prn enemas.    ***Renal***  Renal failure - UF again today.  Replete electrolytes as indicated.    ***ID***  Candidemia - On Caspofungin  Most recent cultures now with staph epi, likely contaminant, no further Vancomycin.  Follow up repeat blood cultures.    ***Endocrine***  DM with hyperglycemia - Insulin sliding scale, NPH.    ***Hematology***  Acute blood loss anemia - no transfusion indication currently, will trend.  SQ Heparin for VTE prophylaxis          Patient requires continuous monitoring with bedside rhythm monitoring, pulse oximetry monitoring, and continuous central venous and arterial pressure monitoring; and intermittent blood gas analysis. Care plan discussed with the ICU care team.   Patient remains critical, at risk for life threatening decompensation.    I have spent 35 minutes providing critical care management to this patient.    By signing my name below, I, Rosio Alonzo, attest that this documentation has been prepared under the direction and in the presence of Jade Rosas MD  Electronically signed: Brian Burnett, 01-05-23 @ 07:56    I, Jade Rosas MD, personally performed the services described in this documentation. all medical record entries made by the scribe were at my direction and in my presence. I have reviewed the chart and agree that the record reflects my personal performance and is accurate and complete  Electronically signed: Jade Rosas MD

## 2023-01-05 NOTE — CONSULT NOTE ADULT - ASSESSMENT
Impression:    Incontinence of bowel and bladder  Incontinence Dermatitis  Pressure Injury Prophylaxis    Recommend:  1.) topical therapy: sacral/buttock skin - cleanse with incontinence cleanser, pat dry, apply Triad ointment BID and PRN for incontinent episodes  2.) Incontinence Management - incontinence cleanser, pads, pericare BID  3.) Maintain on an alternating air with low air loss surface  4.) Turn and reposition Q 2 hours  5.) Nutrition optimization  6.) Offload heels/feet with complete cair air fluidized boots/pillows; ensure that the soles of the feet are not resting on the foot board of the bed.    Care as per medicine. Will not actively follow but will remain available. Please recall for new issues or deterioration.  Upon [facility] [address] [place] [address] [phone]  Thank you for this consult  Seen and discussed with clinical nurse  Camille Miguel, ANA LUISA-C, CWOCN [id number] Impression:    Incontinence of bowel and bladder  Incontinence Dermatitis  Pressure Injury Prophylaxis    Recommend:  1.) topical therapy: sacral/buttock skin - cleanse with incontinence cleanser, pat dry, apply Triad ointment BID and PRN for incontinent episodes  2.) Incontinence Management - incontinence cleanser, pads, pericare BID  3.) Maintain on an alternating air with low air loss surface  4.) Turn and reposition Q 2 hours  5.) Nutrition optimization  6.) Offload heels/feet with complete cair air fluidized boots/pillows; ensure that the soles of the feet are not resting on the foot board of the bed.    Care as per medicine. Will not actively follow but will remain available. Please recall for new issues or deterioration.  Upon discharge f/u as outpatient at [facility] [address] [place] [address] [phone]  Thank you for this consult  Seen and discussed with clinical nurse  Camille Miguel, ANA LUISA-C, CWOCN [id number] Impression:    Incontinence of bowel and bladder  Incontinence Dermatitis  Pressure Injury Prophylaxis    Recommend:  1.) topical therapy: sacral/buttock skin - cleanse with incontinence cleanser, pat dry, apply Triad ointment BID and PRN for incontinent episodes  2.) Incontinence Management - incontinence cleanser, pads, pericare BID  3.) Maintain on an alternating air with low air loss surface  4.) Turn and reposition Q 2 hours  5.) Nutrition optimization  6.) Offload heels/feet with complete cair air fluidized boots/pillows; ensure that the soles of the feet are not resting on the foot board of the bed.    Care as per medicine. Will not actively follow but will remain available. Please recall for new issues or deterioration.  Upon discharge f/u as outpatient at [facility] [address] [place] [address] [phone]  Thank you for this consult  Seen and discussed with clinical nurse  Camille Miguel, ANA LUISA-C, CWOCN [id number]

## 2023-01-05 NOTE — CONSULT NOTE ADULT - SUBJECTIVE AND OBJECTIVE BOX
Wound Surgery Consult Note:    HPI:  62M CAD s/p CABG, DM, HTN, presented as a trasnfer from [place] for STEMI, found to have pancreatitis not necrotizing and cholecystitis on CT AP, admitted to to SICU for respiratory failure due to ARDS. Moved to the CTU for ECMO for cardiogenic shock vs ARDS, ECMO decannulated on 12/8 with IABP placement and mitral clip and removed on 12/17, s/p trac on 12/16/22, now on trach collar.    Request for wound care consult for sacral/bilateral buttocks skin damage received from nursing. Mr. Marley was encountered on an alternating air with low air loss surface with his nurse at the bedside. He denied pain or discomfort related to his buttock or sacrum. He is incontinent of stool and immobile at this time. On exam, slight hyperpigmentation may be related to incontinence. Pressure Injury prevention interventions recommended as below.    PAST MEDICAL & SURGICAL HISTORY:  CABG   DM   HTN   HLD    NSTEMI   acute pancreatitis 2/2 gallstones    REVIEW OF SYSTEMS:    Constitutional:     [x ] negative [ ] fevers [ ] chills [ ] weight loss [ ] weight gain  HEENT:                  [x ] negative [ ] dry eyes [ ] eye irritation [ ] postnasal drip [ ] nasal congestion   CV:                         [x ] negative  [ ] chest pain [ ] orthopnea [ ] palpitations [ ] tachycardia  Resp:                     [x ] negative [ ] cough [ ] shortness of breath [ ] dyspnea [ ] wheezing [ ] sputum [ ] hemoptysis  GI:                          [ x] negative [ ] nausea [ ] vomiting [ ] diarrhea [ ] constipation [ ] abd pain [ ] dysphagia [x ] incontinent of bowel  :                        [ ] negative [ ] dysuria [ ] nocturia [ ] hematuria [ ] increased urinary frequency [ x] oliguria  Musculoskeletal:     [ ] negative [ x] back pain [ ] myalgias [ ] arthralgias [ ] fracture [ ] ambulatory  Skin:                       [ ] negative [ ] rash [ ] itch [ ] wound [x ] skin discoloration  Neurological:        [x ] negative [ ] headache [ ] dizziness [ ] syncope [ ] weakness [ ] numbness  Psychiatric:           [x ] negative [ ] anxiety [ ] depression  Endocrine:            [ ] negative [ ] diabetes [ x] thyroid problem  Heme/Lymph:      [x ] negative [ ] anemia [ ] bleeding problem  Allergic/Immune: [x ] negative [ ] itchy eyes [ ] nasal discharge [ ] hives [ ] angioedema    [x ] All other systems negative    MEDICATIONS  (STANDING):  aMIOdarone    Tablet 200 milliGRAM(s) Oral daily  aspirin  chewable 81 milliGRAM(s) Oral daily  atorvastatin 80 milliGRAM(s) Oral at bedtime  caspofungin IVPB 50 milliGRAM(s) IV Intermittent every 24 hours  caspofungin IVPB      chlorhexidine 0.12% Liquid 15 milliLiter(s) Oral Mucosa every 12 hours  chlorhexidine 2% Cloths 1 Application(s) Topical <User Schedule>  dextrose 5%. 1000 milliLiter(s) (50 mL/Hr) IV Continuous <Continuous>  dextrose 50% Injectable 50 milliLiter(s) IV Push every 15 minutes  epoetin teena-epbx (RETACRIT) Injectable 5000 Unit(s) IV Push <User Schedule>  glucagon  Injectable 1 milliGRAM(s) IntraMuscular once  heparin   Injectable 5000 Unit(s) SubCutaneous every 8 hours  insulin lispro (ADMELOG) corrective regimen sliding scale   SubCutaneous every 4 hours  insulin NPH human recombinant 7 Unit(s) SubCutaneous every 6 hours  metoclopramide Injectable 5 milliGRAM(s) IV Push every 8 hours  midodrine 20 milliGRAM(s) Oral every 8 hours  multivitamin/minerals/iron Oral Solution (CENTRUM) 15 milliLiter(s) Oral daily  pantoprazole  Injectable 40 milliGRAM(s) IV Push daily  phenylephrine    Infusion 0.287 MICROgram(s)/kG/Min (10 mL/Hr) IV Continuous <Continuous>  polyethylene glycol 3350 17 Gram(s) Oral daily  senna 2 Tablet(s) Oral at bedtime  sevelamer carbonate Powder 800 milliGRAM(s) Enteral Tube two times a day  thiamine 100 milliGRAM(s) Enteral Tube daily    MEDICATIONS  (PRN):  acetaminophen    Suspension .. 650 milliGRAM(s) Oral every 6 hours PRN Temp greater or equal to 38.5C (101.3F)  dextrose Oral Gel 15 Gram(s) Oral once PRN Blood Glucose LESS THAN 70 milliGRAM(s)/deciliter  simethicone 80 milliGRAM(s) Chew daily PRN Gas  sodium chloride 0.9% lock flush 10 milliLiter(s) IV Push every 1 hour PRN Pre/post blood products, medications, blood draw, and to maintain line patency    Allergies    No Known Allergies    Intolerances    SOCIAL HISTORY:  , Denies smoking, ETOH, drugs    FAMILY HISTORY: no pertinent family history among first degree relatives    Vital Signs Last 24 Hrs  T(C): 36.8 (05 Jan 2023 16:40), Max: 37.3 (04 Jan 2023 20:00)  T(F): 98.2 (05 Jan 2023 16:40), Max: 99.1 (04 Jan 2023 20:00)  HR: 112 (05 Jan 2023 18:30) (86 - 116)  BP: 96/68 (05 Jan 2023 16:30) (96/68 - 96/68)  BP(mean): 78 (05 Jan 2023 16:30) (78 - 78)  RR: 22 (05 Jan 2023 18:30) (12 - 42)  SpO2: 98% (05 Jan 2023 18:30) (93% - 100%)    Parameters below as of 05 Jan 2023 16:40  Patient On (Oxygen Delivery Method): tracheostomy collar      Physical Exam:  General: Alert, WN  Ophthamology: sclera clear  ENMT: moist mucous membranes, trachea midline  Respiratory: equal chest rise with respirations  Gastrointestinal: soft NT/ND  Neurology: nonverbal, not following commands  Psych: calm, appropriate  Musculoskeletal: no contractures  Vascular: BLE edema equal  Skin:  Sacral/bilateral buttocks with intact, hyperpigmented skin, no drainage  No odor, erythema, increased warmth, tenderness, induration, fluctuance    LABS:  01-05    138  |  98  |  48<H>  ----------------------------<  156<H>  4.2   |  24  |  2.77<H>    Ca    9.1      05 Jan 2023 00:11  Phos  4.3     01-05  Mg     2.6     01-05    TPro  7.4  /  Alb  3.4  /  TBili  0.8  /  DBili  x   /  AST  25  /  ALT  14  /  AlkPhos  76  01-05                          9.7    13.45 )-----------( 108      ( 05 Jan 2023 00:11 )             30.9            Wound Surgery Consult Note:    HPI:  62M CAD s/p CABG, DM, HTN, presented as a trasnfer from [place] for STEMI, found to have pancreatitis not necrotizing and cholecystitis on CT AP, admitted to to SICU for respiratory failure due to ARDS. Moved to the CTU for ECMO for cardiogenic shock vs ARDS, ECMO decannulated on 12/8 with IABP placement and mitral clip and removed on 12/17, s/p trac on 12/16/22, now on trach collar.    Request for wound care consult for sacral/bilateral buttocks skin damage received from nursing. Mr. Marley was encountered on an alternating air with low air loss surface with his nurse at the bedside. He denied pain or discomfort related to his buttock or sacrum. He is incontinent of stool and immobile at this time. On exam, slight hyperpigmentation may be related to incontinence. Pressure Injury prevention interventions recommended as below.    PAST MEDICAL & SURGICAL HISTORY:  CABG   DM   HTN   HLD    NSTEMI   acute pancreatitis 2/2 gallstones    REVIEW OF SYSTEMS:    Constitutional:     [x ] negative [ ] fevers [ ] chills [ ] weight loss [ ] weight gain  HEENT:                  [x ] negative [ ] dry eyes [ ] eye irritation [ ] postnasal drip [ ] nasal congestion   CV:                         [x ] negative  [ ] chest pain [ ] orthopnea [ ] palpitations [ ] tachycardia  Resp:                     [x ] negative [ ] cough [ ] shortness of breath [ ] dyspnea [ ] wheezing [ ] sputum [ ] hemoptysis  GI:                          [ x] negative [ ] nausea [ ] vomiting [ ] diarrhea [ ] constipation [ ] abd pain [ ] dysphagia [x ] incontinent of bowel  :                        [ ] negative [ ] dysuria [ ] nocturia [ ] hematuria [ ] increased urinary frequency [ x] oliguria  Musculoskeletal:     [ ] negative [ x] back pain [ ] myalgias [ ] arthralgias [ ] fracture [ ] ambulatory  Skin:                       [ ] negative [ ] rash [ ] itch [ ] wound [x ] skin discoloration  Neurological:        [x ] negative [ ] headache [ ] dizziness [ ] syncope [ ] weakness [ ] numbness  Psychiatric:           [x ] negative [ ] anxiety [ ] depression  Endocrine:            [ ] negative [ ] diabetes [ x] thyroid problem  Heme/Lymph:      [x ] negative [ ] anemia [ ] bleeding problem  Allergic/Immune: [x ] negative [ ] itchy eyes [ ] nasal discharge [ ] hives [ ] angioedema    [x ] All other systems negative    MEDICATIONS  (STANDING):  aMIOdarone    Tablet 200 milliGRAM(s) Oral daily  aspirin  chewable 81 milliGRAM(s) Oral daily  atorvastatin 80 milliGRAM(s) Oral at bedtime  caspofungin IVPB 50 milliGRAM(s) IV Intermittent every 24 hours  caspofungin IVPB      chlorhexidine 0.12% Liquid 15 milliLiter(s) Oral Mucosa every 12 hours  chlorhexidine 2% Cloths 1 Application(s) Topical <User Schedule>  dextrose 5%. 1000 milliLiter(s) (50 mL/Hr) IV Continuous <Continuous>  dextrose 50% Injectable 50 milliLiter(s) IV Push every 15 minutes  epoetin teena-epbx (RETACRIT) Injectable 5000 Unit(s) IV Push <User Schedule>  glucagon  Injectable 1 milliGRAM(s) IntraMuscular once  heparin   Injectable 5000 Unit(s) SubCutaneous every 8 hours  insulin lispro (ADMELOG) corrective regimen sliding scale   SubCutaneous every 4 hours  insulin NPH human recombinant 7 Unit(s) SubCutaneous every 6 hours  metoclopramide Injectable 5 milliGRAM(s) IV Push every 8 hours  midodrine 20 milliGRAM(s) Oral every 8 hours  multivitamin/minerals/iron Oral Solution (CENTRUM) 15 milliLiter(s) Oral daily  pantoprazole  Injectable 40 milliGRAM(s) IV Push daily  phenylephrine    Infusion 0.287 MICROgram(s)/kG/Min (10 mL/Hr) IV Continuous <Continuous>  polyethylene glycol 3350 17 Gram(s) Oral daily  senna 2 Tablet(s) Oral at bedtime  sevelamer carbonate Powder 800 milliGRAM(s) Enteral Tube two times a day  thiamine 100 milliGRAM(s) Enteral Tube daily    MEDICATIONS  (PRN):  acetaminophen    Suspension .. 650 milliGRAM(s) Oral every 6 hours PRN Temp greater or equal to 38.5C (101.3F)  dextrose Oral Gel 15 Gram(s) Oral once PRN Blood Glucose LESS THAN 70 milliGRAM(s)/deciliter  simethicone 80 milliGRAM(s) Chew daily PRN Gas  sodium chloride 0.9% lock flush 10 milliLiter(s) IV Push every 1 hour PRN Pre/post blood products, medications, blood draw, and to maintain line patency    Allergies    No Known Allergies    Intolerances    SOCIAL HISTORY:  , Denies smoking, ETOH, drugs    FAMILY HISTORY: no pertinent family history among first degree relatives    Vital Signs Last 24 Hrs  T(C): 36.8 (05 Jan 2023 16:40), Max: 37.3 (04 Jan 2023 20:00)  T(F): 98.2 (05 Jan 2023 16:40), Max: 99.1 (04 Jan 2023 20:00)  HR: 112 (05 Jan 2023 18:30) (86 - 116)  BP: 96/68 (05 Jan 2023 16:30) (96/68 - 96/68)  BP(mean): 78 (05 Jan 2023 16:30) (78 - 78)  RR: 22 (05 Jan 2023 18:30) (12 - 42)  SpO2: 98% (05 Jan 2023 18:30) (93% - 100%)    Parameters below as of 05 Jan 2023 16:40  Patient On (Oxygen Delivery Method): tracheostomy collar      Physical Exam:  General: Alert, WN  Ophthamology: sclera clear  ENMT: moist mucous membranes, trachea midline  Respiratory: equal chest rise with respirations  Gastrointestinal: soft NT/ND  Neurology: nonverbal, not following commands  Psych: calm, appropriate  Musculoskeletal: no contractures  Vascular: BLE edema equal  Skin:  Sacral/bilateral buttocks with intact, hyperpigmented skin, no drainage  No odor, erythema, increased warmth, tenderness, induration, fluctuance    LABS:  01-05    138  |  98  |  48<H>  ----------------------------<  156<H>  4.2   |  24  |  2.77<H>    Ca    9.1      05 Jan 2023 00:11  Phos  4.3     01-05  Mg     2.6     01-05    TPro  7.4  /  Alb  3.4  /  TBili  0.8  /  DBili  x   /  AST  25  /  ALT  14  /  AlkPhos  76  01-05                          9.7    13.45 )-----------( 108      ( 05 Jan 2023 00:11 )             30.9            Wound Surgery Consult Note:    HPI:  62M CAD s/p CABG, DM, HTN, presented as a trasnfer from [place] for STEMI, found to have pancreatitis not necrotizing and cholecystitis on CT AP, admitted to to SICU for respiratory failure due to ARDS. Moved to the CTU for ECMO for cardiogenic shock vs ARDS, ECMO decannulated on 12/8 with IABP placement and mitral clip and removed on 12/17, s/p trac on 12/16/22, now on trach collar.    Request for wound care consult for sacral/bilateral buttocks skin damage received from nursing. Mr. Marley was encountered on an alternating air with low air loss surface with his nurse at the bedside. He denied pain or discomfort related to his buttock or sacrum. He is incontinent of stool and immobile at this time. On exam, slight hyperpigmentation may be related to incontinence. Pressure Injury prevention interventions recommended as below.    PAST MEDICAL & SURGICAL HISTORY:  CABG   DM   HTN   HLD    NSTEMI   acute pancreatitis 2/2 gallstones    REVIEW OF SYSTEMS:    Constitutional:     [x ] negative [ ] fevers [ ] chills [ ] weight loss [ ] weight gain  HEENT:                  [x ] negative [ ] dry eyes [ ] eye irritation [ ] postnasal drip [ ] nasal congestion   CV:                         [x ] negative  [ ] chest pain [ ] orthopnea [ ] palpitations [ ] tachycardia  Resp:                     [x ] negative [ ] cough [ ] shortness of breath [ ] dyspnea [ ] wheezing [ ] sputum [ ] hemoptysis  GI:                          [ x] negative [ ] nausea [ ] vomiting [ ] diarrhea [ ] constipation [ ] abd pain [ ] dysphagia [x ] incontinent of bowel  :                        [ ] negative [ ] dysuria [ ] nocturia [ ] hematuria [ ] increased urinary frequency [ x] oliguria  Musculoskeletal:     [ ] negative [ x] back pain [ ] myalgias [ ] arthralgias [ ] fracture [ ] ambulatory  Skin:                       [ ] negative [ ] rash [ ] itch [ ] wound [x ] skin discoloration  Neurological:        [x ] negative [ ] headache [ ] dizziness [ ] syncope [ ] weakness [ ] numbness  Psychiatric:           [x ] negative [ ] anxiety [ ] depression  Endocrine:            [ ] negative [ ] diabetes [ x] thyroid problem  Heme/Lymph:      [x ] negative [ ] anemia [ ] bleeding problem  Allergic/Immune: [x ] negative [ ] itchy eyes [ ] nasal discharge [ ] hives [ ] angioedema    [x ] All other systems negative    MEDICATIONS  (STANDING):  aMIOdarone    Tablet 200 milliGRAM(s) Oral daily  aspirin  chewable 81 milliGRAM(s) Oral daily  atorvastatin 80 milliGRAM(s) Oral at bedtime  caspofungin IVPB 50 milliGRAM(s) IV Intermittent every 24 hours  caspofungin IVPB      chlorhexidine 0.12% Liquid 15 milliLiter(s) Oral Mucosa every 12 hours  chlorhexidine 2% Cloths 1 Application(s) Topical <User Schedule>  dextrose 5%. 1000 milliLiter(s) (50 mL/Hr) IV Continuous <Continuous>  dextrose 50% Injectable 50 milliLiter(s) IV Push every 15 minutes  epoetin teena-epbx (RETACRIT) Injectable 5000 Unit(s) IV Push <User Schedule>  glucagon  Injectable 1 milliGRAM(s) IntraMuscular once  heparin   Injectable 5000 Unit(s) SubCutaneous every 8 hours  insulin lispro (ADMELOG) corrective regimen sliding scale   SubCutaneous every 4 hours  insulin NPH human recombinant 7 Unit(s) SubCutaneous every 6 hours  metoclopramide Injectable 5 milliGRAM(s) IV Push every 8 hours  midodrine 20 milliGRAM(s) Oral every 8 hours  multivitamin/minerals/iron Oral Solution (CENTRUM) 15 milliLiter(s) Oral daily  pantoprazole  Injectable 40 milliGRAM(s) IV Push daily  phenylephrine    Infusion 0.287 MICROgram(s)/kG/Min (10 mL/Hr) IV Continuous <Continuous>  polyethylene glycol 3350 17 Gram(s) Oral daily  senna 2 Tablet(s) Oral at bedtime  sevelamer carbonate Powder 800 milliGRAM(s) Enteral Tube two times a day  thiamine 100 milliGRAM(s) Enteral Tube daily    MEDICATIONS  (PRN):  acetaminophen    Suspension .. 650 milliGRAM(s) Oral every 6 hours PRN Temp greater or equal to 38.5C (101.3F)  dextrose Oral Gel 15 Gram(s) Oral once PRN Blood Glucose LESS THAN 70 milliGRAM(s)/deciliter  simethicone 80 milliGRAM(s) Chew daily PRN Gas  sodium chloride 0.9% lock flush 10 milliLiter(s) IV Push every 1 hour PRN Pre/post blood products, medications, blood draw, and to maintain line patency    Allergies    No Known Allergies    Intolerances    SOCIAL HISTORY:  , Denies smoking, ETOH, drugs    FAMILY HISTORY: no pertinent family history among first degree relatives    Vital Signs Last 24 Hrs  T(C): 36.8 (05 Jan 2023 16:40), Max: 37.3 (04 Jan 2023 20:00)  T(F): 98.2 (05 Jan 2023 16:40), Max: 99.1 (04 Jan 2023 20:00)  HR: 112 (05 Jan 2023 18:30) (86 - 116)  BP: 96/68 (05 Jan 2023 16:30) (96/68 - 96/68)  BP(mean): 78 (05 Jan 2023 16:30) (78 - 78)  RR: 22 (05 Jan 2023 18:30) (12 - 42)  SpO2: 98% (05 Jan 2023 18:30) (93% - 100%)    Parameters below as of 05 Jan 2023 16:40  Patient On (Oxygen Delivery Method): tracheostomy collar      Physical Exam:  General: Alert, WN  Ophthamology: sclera clear  ENMT: moist mucous membranes, trachea midline  Respiratory: equal chest rise with respirations  Gastrointestinal: soft NT/ND  Neurology: nonverbal, not following commands  Psych: calm, appropriate  Musculoskeletal: no contractures  Vascular: BLE edema equal  Skin:  Sacral/bilateral buttocks with intact, hyperpigmented skin, no drainage  No odor, erythema, increased warmth, tenderness, induration, fluctuance    LABS:  01-05    138  |  98  |  48<H>  ----------------------------<  156<H>  4.2   |  24  |  2.77<H>    Ca    9.1      05 Jan 2023 00:11  Phos  4.3     01-05  Mg     2.6     01-05    TPro  7.4  /  Alb  3.4  /  TBili  0.8  /  DBili  x   /  AST  25  /  ALT  14  /  AlkPhos  76  01-05                          9.7    13.45 )-----------( 108      ( 05 Jan 2023 00:11 )             30.9

## 2023-01-05 NOTE — PROGRESS NOTE ADULT - SUBJECTIVE AND OBJECTIVE BOX
infectious diseases progress note:    Patient is a 62y old  Male who presents with a chief complaint of Acute cholecystitis, gallstone pancreatitis (05 Jan 2023 07:55)        Acute pancreatitis without infection or necrosis          ROS:  CONSTITUTIONAL:  Negative fever or chills, feels well, good appetite  EYES:  Negative  blurry vision or double vision  CARDIOVASCULAR:  Negative for chest pain or palpitations  RESPIRATORY:  Negative for cough, wheezing, or SOB   GASTROINTESTINAL:  Negative for nausea, vomiting, diarrhea, constipation, or abdominal pain  GENITOURINARY:  Negative frequency, urgency or dysuria  NEUROLOGIC:  No headache, confusion, dizziness, lightheadedness    Allergies    No Known Allergies    Intolerances        ANTIBIOTICS/RELEVANT:  antimicrobials  caspofungin IVPB 50 milliGRAM(s) IV Intermittent every 24 hours  caspofungin IVPB        immunologic:  epoetin teena-epbx (RETACRIT) Injectable 5000 Unit(s) IV Push <User Schedule>    OTHER:  acetaminophen    Suspension .. 650 milliGRAM(s) Oral every 6 hours PRN  aMIOdarone    Tablet 200 milliGRAM(s) Oral daily  aspirin  chewable 81 milliGRAM(s) Oral daily  atorvastatin 80 milliGRAM(s) Oral at bedtime  chlorhexidine 0.12% Liquid 15 milliLiter(s) Oral Mucosa every 12 hours  chlorhexidine 2% Cloths 1 Application(s) Topical <User Schedule>  dextrose 5%. 1000 milliLiter(s) IV Continuous <Continuous>  dextrose 50% Injectable 50 milliLiter(s) IV Push every 15 minutes  dextrose Oral Gel 15 Gram(s) Oral once PRN  glucagon  Injectable 1 milliGRAM(s) IntraMuscular once  heparin   Injectable 5000 Unit(s) SubCutaneous every 8 hours  insulin lispro (ADMELOG) corrective regimen sliding scale   SubCutaneous every 4 hours  insulin NPH human recombinant 7 Unit(s) SubCutaneous every 6 hours  metoclopramide Injectable 5 milliGRAM(s) IV Push every 8 hours  midodrine 20 milliGRAM(s) Oral every 8 hours  multivitamin/minerals/iron Oral Solution (CENTRUM) 15 milliLiter(s) Oral daily  pantoprazole  Injectable 40 milliGRAM(s) IV Push daily  polyethylene glycol 3350 17 Gram(s) Oral daily  senna 2 Tablet(s) Oral at bedtime  sevelamer carbonate Powder 800 milliGRAM(s) Enteral Tube two times a day  simethicone 80 milliGRAM(s) Chew daily PRN  sodium chloride 0.9% lock flush 10 milliLiter(s) IV Push every 1 hour PRN  thiamine 100 milliGRAM(s) Enteral Tube daily      Objective:  Vital Signs Last 24 Hrs  T(C): 36.9 (05 Jan 2023 08:00), Max: 37.5 (04 Jan 2023 16:00)  T(F): 98.4 (05 Jan 2023 08:00), Max: 99.5 (04 Jan 2023 16:00)  HR: 94 (05 Jan 2023 08:00) (83 - 101)  BP: --  BP(mean): --  RR: 20 (05 Jan 2023 08:00) (12 - 28)  SpO2: 98% (05 Jan 2023 08:00) (98% - 100%)    Parameters below as of 05 Jan 2023 08:00  Patient On (Oxygen Delivery Method): tracheostomy collar    O2 Concentration (%): 40    PHYSICAL EXAM:  Constitutional:Well-developed, well nourished--no acute distress  Eyes:INOCENCIO, EOMI  Ear/Nose/Throat: no oral lesion, no sinus tenderness on percussion	  Neck:no JVD, no lymphadenopathy, supple  Respiratory: CTA lore  Cardiovascular: S1S2 RRR, no murmurs  Gastrointestinal:soft, (+) BS, no HSM  Extremities:no e/e/c        LABS:                        9.7    13.45 )-----------( 108      ( 05 Jan 2023 00:11 )             30.9     01-05    138  |  98  |  48<H>  ----------------------------<  156<H>  4.2   |  24  |  2.77<H>    Ca    9.1      05 Jan 2023 00:11  Phos  4.3     01-05  Mg     2.6     01-05    TPro  7.4  /  Alb  3.4  /  TBili  0.8  /  DBili  x   /  AST  25  /  ALT  14  /  AlkPhos  76  01-05            MICROBIOLOGY:    RECENT CULTURES:  01-03 @ 21:09 .Blood Blood-Peripheral                No growth to date.    01-03 @ 20:45 .Blood Blood-Peripheral                No growth to date.    01-02 @ 01:01 .Blood Blood-Peripheral   PCR    Growth in anaerobic bottle: Gram Positive Cocci in Clusters    Blood Culture PCR  Blood Culture PCR     Growth in anaerobic bottle: Staphylococcus epidermidis  Coag Negative Staphylococcus  Single set isolate, possible contaminant. Contact  Microbiology if susceptibility testing clinically  indicated.  ***Blood Panel PCR results on this specimen are available  approximately 3 hours after the Gram stain result.***  Gram stain, PCR, and/or culture results may not always  correspond due to difference in methodologies.  ************************************************************  This PCR assay was performed by multiplex PCR. This  Assay tests for 66 bacterial and resistance gene targets.  Please refer to the [facility] test directory  at [email or web address] for details.    01-01 @ 17:45 .Sputum Sputum       Few polymorphonuclear leukocytes per low power field  Few Squamous epithelial cells per low power field  Rare Gram positive cocci in pairs per oil power field           Normal Respiratory Christy present    12-30 @ 14:53 Clean Catch Clean Catch (Midstream)                <10,000 CFU/mL Normal Urogenital Christy          RESPIRATORY CULTURES:              RADIOLOGY & ADDITIONAL STUDIES:        Pager [phone]  After 5 pm/weekends or if no response :[phone] infectious diseases progress note:    Patient is a 62y old  Male who presents with a chief complaint of Acute cholecystitis, gallstone pancreatitis (05 Jan 2023 07:55)        Acute pancreatitis without infection or necrosis          ROS:  CONSTITUTIONAL:  Negative fever or chills, feels well, good appetite  EYES:  Negative  blurry vision or double vision  CARDIOVASCULAR:  Negative for chest pain or palpitations  RESPIRATORY:  Negative for cough, wheezing, or SOB   GASTROINTESTINAL:  Negative for nausea, vomiting, diarrhea, constipation, or abdominal pain  GENITOURINARY:  Negative frequency, urgency or dysuria  NEUROLOGIC:  No headache, confusion, dizziness, lightheadedness    Allergies    No Known Allergies    Intolerances        ANTIBIOTICS/RELEVANT:  antimicrobials  caspofungin IVPB 50 milliGRAM(s) IV Intermittent every 24 hours  caspofungin IVPB        immunologic:  epoetin teena-epbx (RETACRIT) Injectable 5000 Unit(s) IV Push <User Schedule>    OTHER:  acetaminophen    Suspension .. 650 milliGRAM(s) Oral every 6 hours PRN  aMIOdarone    Tablet 200 milliGRAM(s) Oral daily  aspirin  chewable 81 milliGRAM(s) Oral daily  atorvastatin 80 milliGRAM(s) Oral at bedtime  chlorhexidine 0.12% Liquid 15 milliLiter(s) Oral Mucosa every 12 hours  chlorhexidine 2% Cloths 1 Application(s) Topical <User Schedule>  dextrose 5%. 1000 milliLiter(s) IV Continuous <Continuous>  dextrose 50% Injectable 50 milliLiter(s) IV Push every 15 minutes  dextrose Oral Gel 15 Gram(s) Oral once PRN  glucagon  Injectable 1 milliGRAM(s) IntraMuscular once  heparin   Injectable 5000 Unit(s) SubCutaneous every 8 hours  insulin lispro (ADMELOG) corrective regimen sliding scale   SubCutaneous every 4 hours  insulin NPH human recombinant 7 Unit(s) SubCutaneous every 6 hours  metoclopramide Injectable 5 milliGRAM(s) IV Push every 8 hours  midodrine 20 milliGRAM(s) Oral every 8 hours  multivitamin/minerals/iron Oral Solution (CENTRUM) 15 milliLiter(s) Oral daily  pantoprazole  Injectable 40 milliGRAM(s) IV Push daily  polyethylene glycol 3350 17 Gram(s) Oral daily  senna 2 Tablet(s) Oral at bedtime  sevelamer carbonate Powder 800 milliGRAM(s) Enteral Tube two times a day  simethicone 80 milliGRAM(s) Chew daily PRN  sodium chloride 0.9% lock flush 10 milliLiter(s) IV Push every 1 hour PRN  thiamine 100 milliGRAM(s) Enteral Tube daily      Objective:  Vital Signs Last 24 Hrs  T(C): 36.9 (05 Jan 2023 08:00), Max: 37.5 (04 Jan 2023 16:00)  T(F): 98.4 (05 Jan 2023 08:00), Max: 99.5 (04 Jan 2023 16:00)  HR: 94 (05 Jan 2023 08:00) (83 - 101)  BP: --  BP(mean): --  RR: 20 (05 Jan 2023 08:00) (12 - 28)  SpO2: 98% (05 Jan 2023 08:00) (98% - 100%)    Parameters below as of 05 Jan 2023 08:00  Patient On (Oxygen Delivery Method): tracheostomy collar    O2 Concentration (%): 40    PHYSICAL EXAM:  Constitutional:Well-developed, well nourished--no acute distress  Eyes:INOCENCIO, EOMI  Ear/Nose/Throat: no oral lesion, no sinus tenderness on percussion	  Neck:no JVD, no lymphadenopathy, supple  Respiratory: CTA lore  Cardiovascular: S1S2 RRR, no murmurs  Gastrointestinal:soft, (+) BS, no HSM  Extremities:no e/e/c        LABS:                        9.7    13.45 )-----------( 108      ( 05 Jan 2023 00:11 )             30.9     01-05    138  |  98  |  48<H>  ----------------------------<  156<H>  4.2   |  24  |  2.77<H>    Ca    9.1      05 Jan 2023 00:11  Phos  4.3     01-05  Mg     2.6     01-05    TPro  7.4  /  Alb  3.4  /  TBili  0.8  /  DBili  x   /  AST  25  /  ALT  14  /  AlkPhos  76  01-05            MICROBIOLOGY:    RECENT CULTURES:  01-03 @ 21:09 .Blood Blood-Peripheral                No growth to date.    01-03 @ 20:45 .Blood Blood-Peripheral                No growth to date.    01-02 @ 01:01 .Blood Blood-Peripheral   PCR    Growth in anaerobic bottle: Gram Positive Cocci in Clusters    Blood Culture PCR  Blood Culture PCR     Growth in anaerobic bottle: Staphylococcus epidermidis  Coag Negative Staphylococcus  Single set isolate, possible contaminant. Contact  Microbiology if susceptibility testing clinically  indicated.  ***Blood Panel PCR results on this specimen are available  approximately 3 hours after the Gram stain result.***  Gram stain, PCR, and/or culture results may not always  correspond due to difference in methodologies.  ************************************************************  This PCR assay was performed by multiplex PCR. This  Assay tests for 66 bacterial and resistance gene targets.  Please refer to the [facility] test directory  at [email or web address] for details.    01-01 @ 17:45 .Sputum Sputum       Few polymorphonuclear leukocytes per low power field  Few Squamous epithelial cells per low power field  Rare Gram positive cocci in pairs per oil power field           Normal Respiratory Christy present    12-30 @ 14:53 Clean Catch Clean Catch (Midstream)                <10,000 CFU/mL Normal Urogenital Christy          RESPIRATORY CULTURES:              RADIOLOGY & ADDITIONAL STUDIES:        Pager [phone]  After 5 pm/weekends or if no response :[phone] infectious diseases progress note:    Patient is a 62y old  Male who presents with a chief complaint of Acute cholecystitis, gallstone pancreatitis (05 Jan 2023 07:55)        Acute pancreatitis without infection or necrosis          ROS:  CONSTITUTIONAL:  Negative fever or chills, feels well, good appetite  EYES:  Negative  blurry vision or double vision  CARDIOVASCULAR:  Negative for chest pain or palpitations  RESPIRATORY:  Negative for cough, wheezing, or SOB   GASTROINTESTINAL:  Negative for nausea, vomiting, diarrhea, constipation, or abdominal pain  GENITOURINARY:  Negative frequency, urgency or dysuria  NEUROLOGIC:  No headache, confusion, dizziness, lightheadedness    Allergies    No Known Allergies    Intolerances        ANTIBIOTICS/RELEVANT:  antimicrobials  caspofungin IVPB 50 milliGRAM(s) IV Intermittent every 24 hours  caspofungin IVPB        immunologic:  epoetin teena-epbx (RETACRIT) Injectable 5000 Unit(s) IV Push <User Schedule>    OTHER:  acetaminophen    Suspension .. 650 milliGRAM(s) Oral every 6 hours PRN  aMIOdarone    Tablet 200 milliGRAM(s) Oral daily  aspirin  chewable 81 milliGRAM(s) Oral daily  atorvastatin 80 milliGRAM(s) Oral at bedtime  chlorhexidine 0.12% Liquid 15 milliLiter(s) Oral Mucosa every 12 hours  chlorhexidine 2% Cloths 1 Application(s) Topical <User Schedule>  dextrose 5%. 1000 milliLiter(s) IV Continuous <Continuous>  dextrose 50% Injectable 50 milliLiter(s) IV Push every 15 minutes  dextrose Oral Gel 15 Gram(s) Oral once PRN  glucagon  Injectable 1 milliGRAM(s) IntraMuscular once  heparin   Injectable 5000 Unit(s) SubCutaneous every 8 hours  insulin lispro (ADMELOG) corrective regimen sliding scale   SubCutaneous every 4 hours  insulin NPH human recombinant 7 Unit(s) SubCutaneous every 6 hours  metoclopramide Injectable 5 milliGRAM(s) IV Push every 8 hours  midodrine 20 milliGRAM(s) Oral every 8 hours  multivitamin/minerals/iron Oral Solution (CENTRUM) 15 milliLiter(s) Oral daily  pantoprazole  Injectable 40 milliGRAM(s) IV Push daily  polyethylene glycol 3350 17 Gram(s) Oral daily  senna 2 Tablet(s) Oral at bedtime  sevelamer carbonate Powder 800 milliGRAM(s) Enteral Tube two times a day  simethicone 80 milliGRAM(s) Chew daily PRN  sodium chloride 0.9% lock flush 10 milliLiter(s) IV Push every 1 hour PRN  thiamine 100 milliGRAM(s) Enteral Tube daily      Objective:  Vital Signs Last 24 Hrs  T(C): 36.9 (05 Jan 2023 08:00), Max: 37.5 (04 Jan 2023 16:00)  T(F): 98.4 (05 Jan 2023 08:00), Max: 99.5 (04 Jan 2023 16:00)  HR: 94 (05 Jan 2023 08:00) (83 - 101)  BP: --  BP(mean): --  RR: 20 (05 Jan 2023 08:00) (12 - 28)  SpO2: 98% (05 Jan 2023 08:00) (98% - 100%)    Parameters below as of 05 Jan 2023 08:00  Patient On (Oxygen Delivery Method): tracheostomy collar    O2 Concentration (%): 40    PHYSICAL EXAM:  Constitutional:Well-developed, well nourished--no acute distress  Eyes:INOCENCIO, EOMI  Ear/Nose/Throat: no oral lesion, no sinus tenderness on percussion	  Neck:no JVD, no lymphadenopathy, supple  Respiratory: CTA lore  Cardiovascular: S1S2 RRR, no murmurs  Gastrointestinal:soft, (+) BS, no HSM  Extremities:no e/e/c        LABS:                        9.7    13.45 )-----------( 108      ( 05 Jan 2023 00:11 )             30.9     01-05    138  |  98  |  48<H>  ----------------------------<  156<H>  4.2   |  24  |  2.77<H>    Ca    9.1      05 Jan 2023 00:11  Phos  4.3     01-05  Mg     2.6     01-05    TPro  7.4  /  Alb  3.4  /  TBili  0.8  /  DBili  x   /  AST  25  /  ALT  14  /  AlkPhos  76  01-05            MICROBIOLOGY:    RECENT CULTURES:  01-03 @ 21:09 .Blood Blood-Peripheral                No growth to date.    01-03 @ 20:45 .Blood Blood-Peripheral                No growth to date.    01-02 @ 01:01 .Blood Blood-Peripheral   PCR    Growth in anaerobic bottle: Gram Positive Cocci in Clusters    Blood Culture PCR  Blood Culture PCR     Growth in anaerobic bottle: Staphylococcus epidermidis  Coag Negative Staphylococcus  Single set isolate, possible contaminant. Contact  Microbiology if susceptibility testing clinically  indicated.  ***Blood Panel PCR results on this specimen are available  approximately 3 hours after the Gram stain result.***  Gram stain, PCR, and/or culture results may not always  correspond due to difference in methodologies.  ************************************************************  This PCR assay was performed by multiplex PCR. This  Assay tests for 66 bacterial and resistance gene targets.  Please refer to the [facility] test directory  at [email or web address] for details.    01-01 @ 17:45 .Sputum Sputum       Few polymorphonuclear leukocytes per low power field  Few Squamous epithelial cells per low power field  Rare Gram positive cocci in pairs per oil power field           Normal Respiratory Christy present    12-30 @ 14:53 Clean Catch Clean Catch (Midstream)                <10,000 CFU/mL Normal Urogenital Christy          RESPIRATORY CULTURES:              RADIOLOGY & ADDITIONAL STUDIES:        Pager [phone]  After 5 pm/weekends or if no response :[phone]

## 2023-01-06 LAB
ALBUMIN SERPL ELPH-MCNC: 3.5 G/DL — SIGNIFICANT CHANGE UP (ref 3.3–5)
ALP SERPL-CCNC: 77 U/L — SIGNIFICANT CHANGE UP (ref 40–120)
ALT FLD-CCNC: 16 U/L — SIGNIFICANT CHANGE UP (ref 10–45)
ANION GAP SERPL CALC-SCNC: 15 MMOL/L — SIGNIFICANT CHANGE UP (ref 5–17)
AST SERPL-CCNC: 28 U/L — SIGNIFICANT CHANGE UP (ref 10–40)
BILIRUB SERPL-MCNC: 1 MG/DL — SIGNIFICANT CHANGE UP (ref 0.2–1.2)
BLD GP AB SCN SERPL QL: NEGATIVE — SIGNIFICANT CHANGE UP
BUN SERPL-MCNC: 68 MG/DL — HIGH (ref 7–23)
CALCIUM SERPL-MCNC: 8.8 MG/DL — SIGNIFICANT CHANGE UP (ref 8.4–10.5)
CHLORIDE SERPL-SCNC: 94 MMOL/L — LOW (ref 96–108)
CO2 SERPL-SCNC: 25 MMOL/L — SIGNIFICANT CHANGE UP (ref 22–31)
CREAT SERPL-MCNC: 3.8 MG/DL — HIGH (ref 0.5–1.3)
EGFR: 17 ML/MIN/1.73M2 — LOW
GAS PNL BLDA: SIGNIFICANT CHANGE UP
GLUCOSE BLDC GLUCOMTR-MCNC: 117 MG/DL — HIGH (ref 70–99)
GLUCOSE BLDC GLUCOMTR-MCNC: 142 MG/DL — HIGH (ref 70–99)
GLUCOSE BLDC GLUCOMTR-MCNC: 164 MG/DL — HIGH (ref 70–99)
GLUCOSE BLDC GLUCOMTR-MCNC: 184 MG/DL — HIGH (ref 70–99)
GLUCOSE BLDC GLUCOMTR-MCNC: 203 MG/DL — HIGH (ref 70–99)
GLUCOSE BLDC GLUCOMTR-MCNC: 216 MG/DL — HIGH (ref 70–99)
GLUCOSE SERPL-MCNC: 131 MG/DL — HIGH (ref 70–99)
HCT VFR BLD CALC: 30.3 % — LOW (ref 39–50)
HGB BLD-MCNC: 9.3 G/DL — LOW (ref 13–17)
MAGNESIUM SERPL-MCNC: 2.9 MG/DL — HIGH (ref 1.6–2.6)
MCHC RBC-ENTMCNC: 29.6 PG — SIGNIFICANT CHANGE UP (ref 27–34)
MCHC RBC-ENTMCNC: 30.7 GM/DL — LOW (ref 32–36)
MCV RBC AUTO: 96.5 FL — SIGNIFICANT CHANGE UP (ref 80–100)
NRBC # BLD: 0 /100 WBCS — SIGNIFICANT CHANGE UP (ref 0–0)
PHOSPHATE SERPL-MCNC: 5.4 MG/DL — HIGH (ref 2.5–4.5)
PLATELET # BLD AUTO: 139 K/UL — LOW (ref 150–400)
POTASSIUM SERPL-MCNC: 5 MMOL/L — SIGNIFICANT CHANGE UP (ref 3.5–5.3)
POTASSIUM SERPL-SCNC: 5 MMOL/L — SIGNIFICANT CHANGE UP (ref 3.5–5.3)
PROT SERPL-MCNC: 7.5 G/DL — SIGNIFICANT CHANGE UP (ref 6–8.3)
RBC # BLD: 3.14 M/UL — LOW (ref 4.2–5.8)
RBC # FLD: 18 % — HIGH (ref 10.3–14.5)
RH IG SCN BLD-IMP: POSITIVE — SIGNIFICANT CHANGE UP
SODIUM SERPL-SCNC: 134 MMOL/L — LOW (ref 135–145)
WBC # BLD: 13.93 K/UL — HIGH (ref 3.8–10.5)
WBC # FLD AUTO: 13.93 K/UL — HIGH (ref 3.8–10.5)

## 2023-01-06 PROCEDURE — 99291 CRITICAL CARE FIRST HOUR: CPT

## 2023-01-06 PROCEDURE — 99233 SBSQ HOSP IP/OBS HIGH 50: CPT | Mod: GC

## 2023-01-06 PROCEDURE — 71045 X-RAY EXAM CHEST 1 VIEW: CPT | Mod: 26

## 2023-01-06 PROCEDURE — 99232 SBSQ HOSP IP/OBS MODERATE 35: CPT

## 2023-01-06 PROCEDURE — 99233 SBSQ HOSP IP/OBS HIGH 50: CPT

## 2023-01-06 RX ORDER — CALCIUM GLUCONATE 100 MG/ML
2 VIAL (ML) INTRAVENOUS ONCE
Refills: 0 | Status: COMPLETED | OUTPATIENT
Start: 2023-01-06 | End: 2023-01-06

## 2023-01-06 RX ORDER — MIDODRINE HYDROCHLORIDE 2.5 MG/1
10 TABLET ORAL EVERY 8 HOURS
Refills: 0 | Status: DISCONTINUED | OUTPATIENT
Start: 2023-01-06 | End: 2023-01-09

## 2023-01-06 RX ORDER — SEVELAMER CARBONATE 2400 MG/1
800 POWDER, FOR SUSPENSION ORAL
Refills: 0 | Status: DISCONTINUED | OUTPATIENT
Start: 2023-01-06 | End: 2023-01-09

## 2023-01-06 RX ORDER — SEVELAMER CARBONATE 2400 MG/1
800 POWDER, FOR SUSPENSION ORAL THREE TIMES A DAY
Refills: 0 | Status: DISCONTINUED | OUTPATIENT
Start: 2023-01-06 | End: 2023-01-06

## 2023-01-06 RX ORDER — CHLORPROMAZINE HCL 10 MG
25 TABLET ORAL ONCE
Refills: 0 | Status: COMPLETED | OUTPATIENT
Start: 2023-01-06 | End: 2023-01-06

## 2023-01-06 RX ADMIN — Medication 5: at 13:31

## 2023-01-06 RX ADMIN — Medication 51 MILLIGRAM(S): at 16:06

## 2023-01-06 RX ADMIN — Medication 81 MILLIGRAM(S): at 11:45

## 2023-01-06 RX ADMIN — HEPARIN SODIUM 5000 UNIT(S): 5000 INJECTION INTRAVENOUS; SUBCUTANEOUS at 17:26

## 2023-01-06 RX ADMIN — Medication 100 MILLIGRAM(S): at 11:44

## 2023-01-06 RX ADMIN — ERYTHROPOIETIN 5000 UNIT(S): 10000 INJECTION, SOLUTION INTRAVENOUS; SUBCUTANEOUS at 18:08

## 2023-01-06 RX ADMIN — AMIODARONE HYDROCHLORIDE 200 MILLIGRAM(S): 400 TABLET ORAL at 05:30

## 2023-01-06 RX ADMIN — SEVELAMER CARBONATE 800 MILLIGRAM(S): 2400 POWDER, FOR SUSPENSION ORAL at 13:43

## 2023-01-06 RX ADMIN — Medication 3: at 17:27

## 2023-01-06 RX ADMIN — HEPARIN SODIUM 5000 UNIT(S): 5000 INJECTION INTRAVENOUS; SUBCUTANEOUS at 09:19

## 2023-01-06 RX ADMIN — CHLORHEXIDINE GLUCONATE 1 APPLICATION(S): 213 SOLUTION TOPICAL at 06:00

## 2023-01-06 RX ADMIN — Medication 3: at 09:24

## 2023-01-06 RX ADMIN — HUMAN INSULIN 7 UNIT(S): 100 INJECTION, SUSPENSION SUBCUTANEOUS at 06:30

## 2023-01-06 RX ADMIN — SEVELAMER CARBONATE 800 MILLIGRAM(S): 2400 POWDER, FOR SUSPENSION ORAL at 05:29

## 2023-01-06 RX ADMIN — CHLORHEXIDINE GLUCONATE 15 MILLILITER(S): 213 SOLUTION TOPICAL at 17:25

## 2023-01-06 RX ADMIN — SENNA PLUS 2 TABLET(S): 8.6 TABLET ORAL at 21:17

## 2023-01-06 RX ADMIN — MIDODRINE HYDROCHLORIDE 20 MILLIGRAM(S): 2.5 TABLET ORAL at 05:30

## 2023-01-06 RX ADMIN — MIDODRINE HYDROCHLORIDE 10 MILLIGRAM(S): 2.5 TABLET ORAL at 21:16

## 2023-01-06 RX ADMIN — Medication 200 GRAM(S): at 02:35

## 2023-01-06 RX ADMIN — POLYETHYLENE GLYCOL 3350 17 GRAM(S): 17 POWDER, FOR SOLUTION ORAL at 11:44

## 2023-01-06 RX ADMIN — Medication 5 MILLIGRAM(S): at 13:13

## 2023-01-06 RX ADMIN — MIDODRINE HYDROCHLORIDE 10 MILLIGRAM(S): 2.5 TABLET ORAL at 13:43

## 2023-01-06 RX ADMIN — CHLORHEXIDINE GLUCONATE 15 MILLILITER(S): 213 SOLUTION TOPICAL at 05:30

## 2023-01-06 RX ADMIN — HUMAN INSULIN 7 UNIT(S): 100 INJECTION, SUSPENSION SUBCUTANEOUS at 11:45

## 2023-01-06 RX ADMIN — SEVELAMER CARBONATE 800 MILLIGRAM(S): 2400 POWDER, FOR SUSPENSION ORAL at 17:26

## 2023-01-06 RX ADMIN — CASPOFUNGIN ACETATE 260 MILLIGRAM(S): 7 INJECTION, POWDER, LYOPHILIZED, FOR SOLUTION INTRAVENOUS at 17:54

## 2023-01-06 RX ADMIN — Medication 5: at 06:30

## 2023-01-06 RX ADMIN — HUMAN INSULIN 7 UNIT(S): 100 INJECTION, SUSPENSION SUBCUTANEOUS at 00:30

## 2023-01-06 RX ADMIN — PANTOPRAZOLE SODIUM 40 MILLIGRAM(S): 20 TABLET, DELAYED RELEASE ORAL at 11:45

## 2023-01-06 RX ADMIN — Medication 15 MILLILITER(S): at 11:45

## 2023-01-06 RX ADMIN — HEPARIN SODIUM 5000 UNIT(S): 5000 INJECTION INTRAVENOUS; SUBCUTANEOUS at 00:31

## 2023-01-06 RX ADMIN — Medication 5 MILLIGRAM(S): at 05:30

## 2023-01-06 RX ADMIN — Medication 5 MILLIGRAM(S): at 00:00

## 2023-01-06 RX ADMIN — ATORVASTATIN CALCIUM 80 MILLIGRAM(S): 80 TABLET, FILM COATED ORAL at 21:16

## 2023-01-06 RX ADMIN — HUMAN INSULIN 7 UNIT(S): 100 INJECTION, SUSPENSION SUBCUTANEOUS at 17:26

## 2023-01-06 NOTE — PROGRESS NOTE ADULT - NS ATTEND AMEND GEN_ALL_CORE FT
Patient was seen and examined at bedside with the advanced care provider.    Continues to do well.  JVP appears lower on exam today.  Would start considering transitioning to a three times weekly HD schedule as BP permits. In such as case, would limit use of midodrine to HD days only.  Can consider dosing diuretics on non HD days. TSH normal.  Pending AM cortisol level.  GDMT has been limited by hemodynamics and hypotension.  Just came off of phenylephrine earlier this week.  Can consider low dose hydralazine as able and eventually beta blocker before discharge.

## 2023-01-06 NOTE — PROGRESS NOTE ADULT - SUBJECTIVE AND OBJECTIVE BOX
Patient seen and examined at the bedside.    Remained critically ill on continuous ICU monitoring.      Brief Summary:  61 y/o male with PMH of CABG, DM, HTN, HLD presenting with a STEMI.   He was in cardiogenic shock requiring VA ECMO, respiratory failure requiring tracheostomy, and renal failure requiring RRT.        24 Hour events:      OBJECTIVE:  Vital Signs Last 24 Hrs  T(C): 36.6 (06 Jan 2023 04:00), Max: 37.2 (05 Jan 2023 20:00)  T(F): 97.9 (06 Jan 2023 04:00), Max: 99 (05 Jan 2023 20:00)  HR: 109 (06 Jan 2023 04:45) (90 - 116)  BP: 96/68 (05 Jan 2023 16:30) (96/68 - 96/68)  BP(mean): 78 (05 Jan 2023 16:30) (78 - 78)  RR: 24 (06 Jan 2023 04:45) (7 - 42)  SpO2: 97% (06 Jan 2023 04:45) (93% - 100%)    Parameters below as of 06 Jan 2023 04:00  Patient On (Oxygen Delivery Method): tracheostomy collar    O2 Concentration (%): 40    Mode: standby  FiO2: 40              Physical Exam:   General: Sitting in chair, no acute distress   Neurology: Following commands, alert and interactive  Eyes: Bilateral pupils reactive   ENT/Neck: Trach collar  Respiratory: Breath sounds bilaterally  CV: Sinus Tachycardic   Abdominal: Soft, nontender  Extremities: Warm       -------------------------------------------------------------------------------------------------------------------------------                        9.3    13.93 )-----------( 139      ( 06 Jan 2023 00:38 )             30.3     01-06    134<L>  |  94<L>  |  68<H>  ----------------------------<  131<H>  5.0   |  25  |  3.80<H>    Ca    8.8      06 Jan 2023 00:38  Phos  5.4     01-06  Mg     2.9     01-06    TPro  7.5  /  Alb  3.5  /  TBili  1.0  /  DBili  x   /  AST  28  /  ALT  16  /  AlkPhos  77  01-06    LIVER FUNCTIONS - ( 06 Jan 2023 00:38 )  Alb: 3.5 g/dL / Pro: 7.5 g/dL / ALK PHOS: 77 U/L / ALT: 16 U/L / AST: 28 U/L / GGT: x             ABG - ( 06 Jan 2023 03:01 )  pH, Arterial: 7.48  pH, Blood: x     /  pCO2: 35    /  pO2: 75    / HCO3: 26    / Base Excess: 2.6   /  SaO2: 96.2              ------------------------------------------------------------------------------------------------------------------------------  Assessment:  61 y/o M admitted with a STEMI and cardiogenic shock.     Cardiogenic shock s/p VA ECMO decannulated 12/8  Mitral regurgitation s/p Mitral clip x1 12/16/22  Acute respiratory distress/failure s/p Tracheostomy 12/16/22   At high risk for hemodynamic instability  ERIC/Renal failure  Fungemia  Anemia  Thrombocytopenia       Plan:   ***Neuro***  PT ongoing.  Pain control with prns  Optimize day/night cycles.    ***Cardiovascular***  At high risk for hemodynamic instability and cardiac arrhythmias.  Wean pressors for MAP > 65 mm Hg  Midodrine to support blood pressure.  Continue Amiodarone  ASA /Statin daily    ***Pulmonary***  Respiratory failure s/p tracheostomy  Trach collar as tolerated.  Deep breathing and coughing exercises.  Wean oxygen as able.          ***GI***  Protein calorie malnutrition - Tube feeds via nasal feeding tube  Protonix for GI prophylaxis   Bowel regimen with Miralax and Senna and prn enemas.  Reglan for gut motility    ***Renal***  Renal failure - UF again yesterday  Replete electrolytes as indicated.        ***ID***  Candidemia - On Caspofungin  Cultures from 1/2 with staph epi, likely contaminant, no further Vancomycin.  Follow up repeat blood cultures on 1/3 - NGTD     ***Endocrine***  DM with hyperglycemia - Insulin sliding scale, NPH.    ***Hematology***  Acute blood loss anemia - no transfusion indication currently, will trend.  SQ Heparin for VTE prophylaxis          Patient requires continuous monitoring with bedside rhythm monitoring, pulse oximetry monitoring, and continuous central venous and arterial pressure monitoring; and intermittent blood gas analysis. Care plan discussed with the ICU care team.   Patient remained critical, at risk for life threatening decompensation.    I have spent 30 minutes providing critical care management to this patient.    By signing my name below, I, Leela Avila, attest that this documentation has been prepared under the direction and in the presence of Jade Rosas MD  Electronically signed: Brian Oakes, 01-06-23 @ 06:10    I, Jade Rosas MD, personally performed the services described in this documentation. all medical record entries made by the karlaibkyle were at my direction and in my presence. I have reviewed the chart and agree that the record reflects my personal performance and is accurate and complete  Electronically signed: Jade Rosas MD Patient seen and examined at the bedside.    Remained critically ill on continuous ICU monitoring.      Brief Summary:  63 y/o male with PMH of CABG, DM, HTN, HLD presenting with a STEMI.   He was in cardiogenic shock requiring VA ECMO, respiratory failure requiring tracheostomy, and renal failure requiring RRT.        24 Hour events:      OBJECTIVE:  Vital Signs Last 24 Hrs  T(C): 36.6 (06 Jan 2023 04:00), Max: 37.2 (05 Jan 2023 20:00)  T(F): 97.9 (06 Jan 2023 04:00), Max: 99 (05 Jan 2023 20:00)  HR: 109 (06 Jan 2023 04:45) (90 - 116)  BP: 96/68 (05 Jan 2023 16:30) (96/68 - 96/68)  BP(mean): 78 (05 Jan 2023 16:30) (78 - 78)  RR: 24 (06 Jan 2023 04:45) (7 - 42)  SpO2: 97% (06 Jan 2023 04:45) (93% - 100%)    Parameters below as of 06 Jan 2023 04:00  Patient On (Oxygen Delivery Method): tracheostomy collar    O2 Concentration (%): 40    Mode: standby  FiO2: 40              Physical Exam:   General: Sitting in chair, no acute distress   Neurology: Following commands, alert and interactive  Eyes: Bilateral pupils reactive   ENT/Neck: Trach collar  Respiratory: Breath sounds bilaterally  CV: Sinus Tachycardic   Abdominal: Soft, nontender  Extremities: Warm       -------------------------------------------------------------------------------------------------------------------------------                        9.3    13.93 )-----------( 139      ( 06 Jan 2023 00:38 )             30.3     01-06    134<L>  |  94<L>  |  68<H>  ----------------------------<  131<H>  5.0   |  25  |  3.80<H>    Ca    8.8      06 Jan 2023 00:38  Phos  5.4     01-06  Mg     2.9     01-06    TPro  7.5  /  Alb  3.5  /  TBili  1.0  /  DBili  x   /  AST  28  /  ALT  16  /  AlkPhos  77  01-06    LIVER FUNCTIONS - ( 06 Jan 2023 00:38 )  Alb: 3.5 g/dL / Pro: 7.5 g/dL / ALK PHOS: 77 U/L / ALT: 16 U/L / AST: 28 U/L / GGT: x             ABG - ( 06 Jan 2023 03:01 )  pH, Arterial: 7.48  pH, Blood: x     /  pCO2: 35    /  pO2: 75    / HCO3: 26    / Base Excess: 2.6   /  SaO2: 96.2              ------------------------------------------------------------------------------------------------------------------------------  Assessment:  63 y/o M admitted with a STEMI and cardiogenic shock.     Cardiogenic shock s/p VA ECMO decannulated 12/8  Mitral regurgitation s/p Mitral clip x1 12/16/22  Acute respiratory distress/failure s/p Tracheostomy 12/16/22   At high risk for hemodynamic instability  ERIC/Renal failure  Fungemia  Anemia  Thrombocytopenia       Plan:   ***Neuro***  PT ongoing.  Pain control with prns  Optimize day/night cycles.    ***Cardiovascular***  At high risk for hemodynamic instability and cardiac arrhythmias.  Wean pressors for MAP > 65 mm Hg  Midodrine to support blood pressure.  Continue Amiodarone  ASA /Statin daily    ***Pulmonary***  Respiratory failure s/p tracheostomy  Trach collar as tolerated.  Deep breathing and coughing exercises.  Wean oxygen as able.          ***GI***  Protein calorie malnutrition - Tube feeds via nasal feeding tube  Protonix for GI prophylaxis   Bowel regimen with Miralax and Senna and prn enemas.  Reglan for gut motility    ***Renal***  Renal failure - UF again yesterday  Replete electrolytes as indicated.        ***ID***  Candidemia - On Caspofungin  Cultures from 1/2 with staph epi, likely contaminant, no further Vancomycin.  Follow up repeat blood cultures on 1/3 - NGTD     ***Endocrine***  DM with hyperglycemia - Insulin sliding scale, NPH.    ***Hematology***  Acute blood loss anemia - no transfusion indication currently, will trend.  SQ Heparin for VTE prophylaxis          Patient requires continuous monitoring with bedside rhythm monitoring, pulse oximetry monitoring, and continuous central venous and arterial pressure monitoring; and intermittent blood gas analysis. Care plan discussed with the ICU care team.   Patient remained critical, at risk for life threatening decompensation.    I have spent 30 minutes providing critical care management to this patient.    By signing my name below, I, Leela Avila, attest that this documentation has been prepared under the direction and in the presence of Jade Rosas MD  Electronically signed: Brian Oakes, 01-06-23 @ 06:10    I, Jade Rosas MD, personally performed the services described in this documentation. all medical record entries made by the karlaibkyle were at my direction and in my presence. I have reviewed the chart and agree that the record reflects my personal performance and is accurate and complete  Electronically signed: Jade Rosas MD Patient seen and examined at the bedside.    Remained critically ill on continuous ICU monitoring.      Brief Summary:  61 y/o male with PMH of CABG, DM, HTN, HLD presenting with a STEMI.   He was in cardiogenic shock requiring VA ECMO, respiratory failure requiring tracheostomy, and renal failure requiring RRT.        24 Hour events:  - Tolerating TC   - HD yesterday   - Marching in place with PT     OBJECTIVE:  Vital Signs Last 24 Hrs  T(C): 36.6 (06 Jan 2023 04:00), Max: 37.2 (05 Jan 2023 20:00)  T(F): 97.9 (06 Jan 2023 04:00), Max: 99 (05 Jan 2023 20:00)  HR: 109 (06 Jan 2023 04:45) (90 - 116)  BP: 96/68 (05 Jan 2023 16:30) (96/68 - 96/68)  BP(mean): 78 (05 Jan 2023 16:30) (78 - 78)  RR: 24 (06 Jan 2023 04:45) (7 - 42)  SpO2: 97% (06 Jan 2023 04:45) (93% - 100%)    Parameters below as of 06 Jan 2023 04:00  Patient On (Oxygen Delivery Method): tracheostomy collar    O2 Concentration (%): 40    Mode: standby  FiO2: 40              Physical Exam:   General: Sitting in chair, no acute distress   Neurology: Following commands, alert and interactive  Eyes: Bilateral pupils reactive   ENT/Neck: Trach collar  Respiratory: Breath sounds bilaterally  CV: Sinus Tachycardic   Abdominal: Soft, nontender  Extremities: Warm       -------------------------------------------------------------------------------------------------------------------------------                        9.3    13.93 )-----------( 139      ( 06 Jan 2023 00:38 )             30.3     01-06    134<L>  |  94<L>  |  68<H>  ----------------------------<  131<H>  5.0   |  25  |  3.80<H>    Ca    8.8      06 Jan 2023 00:38  Phos  5.4     01-06  Mg     2.9     01-06    TPro  7.5  /  Alb  3.5  /  TBili  1.0  /  DBili  x   /  AST  28  /  ALT  16  /  AlkPhos  77  01-06    LIVER FUNCTIONS - ( 06 Jan 2023 00:38 )  Alb: 3.5 g/dL / Pro: 7.5 g/dL / ALK PHOS: 77 U/L / ALT: 16 U/L / AST: 28 U/L / GGT: x             ABG - ( 06 Jan 2023 03:01 )  pH, Arterial: 7.48  pH, Blood: x     /  pCO2: 35    /  pO2: 75    / HCO3: 26    / Base Excess: 2.6   /  SaO2: 96.2              ------------------------------------------------------------------------------------------------------------------------------  Assessment:  61 y/o M admitted with a STEMI and cardiogenic shock.     Cardiogenic shock s/p VA ECMO decannulated 12/8  Mitral regurgitation s/p Mitral clip x1 12/16/22  Acute respiratory distress/failure s/p Tracheostomy 12/16/22   At high risk for hemodynamic instability  ERIC/Renal failure  Fungemia  Anemia  Thrombocytopenia       Plan:   ***Neuro***  PT ongoing.  Pain control with prns  Optimize day/night cycles.    ***Cardiovascular***  At high risk for hemodynamic instability and cardiac arrhythmias.  Wean pressors for MAP > 65 mm Hg  Midodrine to support blood pressure.  Continue Amiodarone  ASA /Statin daily    ***Pulmonary***  Respiratory failure s/p tracheostomy  Trach collar as tolerated.  Deep breathing and coughing exercises.  Wean oxygen as able.          ***GI***  Protein calorie malnutrition - Tube feeds via nasal feeding tube  Protonix for GI prophylaxis   Bowel regimen with Miralax and Senna and prn enemas.  Reglan for gut motility    ***Renal***  Renal failure - UF again yesterday  HD today   Replete electrolytes as indicated.        ***ID***  Candidemia - On Caspofungin  Cultures from 1/2 with staph epi, likely contaminant, no further Vancomycin.  Follow up repeat blood cultures on 1/3 - NGTD     ***Endocrine***  DM with hyperglycemia - Insulin sliding scale, NPH.    ***Hematology***  Acute blood loss anemia - no transfusion indication currently, will trend.  SQ Heparin for VTE prophylaxis          Patient requires continuous monitoring with bedside rhythm monitoring, pulse oximetry monitoring, and continuous central venous and arterial pressure monitoring; and intermittent blood gas analysis. Care plan discussed with the ICU care team.   Patient remained critical, at risk for life threatening decompensation.    I have spent 30 minutes providing critical care management to this patient.    By signing my name below, I, Leela Avila, attest that this documentation has been prepared under the direction and in the presence of Jade Rosas MD  Electronically signed: Brian Oakes, 01-06-23 @ 06:10    Jade MANN MD, personally performed the services described in this documentation. all medical record entries made by the karlaibe were at my direction and in my presence. I have reviewed the chart and agree that the record reflects my personal performance and is accurate and complete  Electronically signed: Jade Rosas MD Patient seen and examined at the bedside.    Remained critically ill on continuous ICU monitoring.      Brief Summary:  63 y/o male with PMH of CABG, DM, HTN, HLD presenting with a STEMI.   He was in cardiogenic shock requiring VA ECMO, respiratory failure requiring tracheostomy, and renal failure requiring RRT.        24 Hour events:  - Tolerating TC   - HD yesterday   - Marching in place with PT     OBJECTIVE:  Vital Signs Last 24 Hrs  T(C): 36.6 (06 Jan 2023 04:00), Max: 37.2 (05 Jan 2023 20:00)  T(F): 97.9 (06 Jan 2023 04:00), Max: 99 (05 Jan 2023 20:00)  HR: 109 (06 Jan 2023 04:45) (90 - 116)  BP: 96/68 (05 Jan 2023 16:30) (96/68 - 96/68)  BP(mean): 78 (05 Jan 2023 16:30) (78 - 78)  RR: 24 (06 Jan 2023 04:45) (7 - 42)  SpO2: 97% (06 Jan 2023 04:45) (93% - 100%)    Parameters below as of 06 Jan 2023 04:00  Patient On (Oxygen Delivery Method): tracheostomy collar    O2 Concentration (%): 40    Mode: standby  FiO2: 40              Physical Exam:   General: Sitting in chair, no acute distress   Neurology: Following commands, alert and interactive  Eyes: Bilateral pupils reactive   ENT/Neck: Trach collar  Respiratory: Breath sounds bilaterally  CV: Sinus Tachycardic   Abdominal: Soft, nontender  Extremities: Warm       -------------------------------------------------------------------------------------------------------------------------------                        9.3    13.93 )-----------( 139      ( 06 Jan 2023 00:38 )             30.3     01-06    134<L>  |  94<L>  |  68<H>  ----------------------------<  131<H>  5.0   |  25  |  3.80<H>    Ca    8.8      06 Jan 2023 00:38  Phos  5.4     01-06  Mg     2.9     01-06    TPro  7.5  /  Alb  3.5  /  TBili  1.0  /  DBili  x   /  AST  28  /  ALT  16  /  AlkPhos  77  01-06    LIVER FUNCTIONS - ( 06 Jan 2023 00:38 )  Alb: 3.5 g/dL / Pro: 7.5 g/dL / ALK PHOS: 77 U/L / ALT: 16 U/L / AST: 28 U/L / GGT: x             ABG - ( 06 Jan 2023 03:01 )  pH, Arterial: 7.48  pH, Blood: x     /  pCO2: 35    /  pO2: 75    / HCO3: 26    / Base Excess: 2.6   /  SaO2: 96.2              ------------------------------------------------------------------------------------------------------------------------------  Assessment:  63 y/o M admitted with a STEMI and cardiogenic shock.     Cardiogenic shock s/p VA ECMO decannulated 12/8  Mitral regurgitation s/p Mitral clip x1 12/16/22  Acute respiratory distress/failure s/p Tracheostomy 12/16/22   At high risk for hemodynamic instability  ERIC/Renal failure  Fungemia  Anemia  Thrombocytopenia       Plan:   ***Neuro***  PT ongoing.  Pain control with prns  Optimize day/night cycles.    ***Cardiovascular***  At high risk for hemodynamic instability and cardiac arrhythmias.  Wean pressors for MAP > 65 mm Hg  Midodrine to support blood pressure.  Continue Amiodarone  ASA /Statin daily    ***Pulmonary***  Respiratory failure s/p tracheostomy  Trach collar as tolerated.  Deep breathing and coughing exercises.  Wean oxygen as able.          ***GI***  Protein calorie malnutrition - Tube feeds via nasal feeding tube  Protonix for GI prophylaxis   Bowel regimen with Miralax and Senna and prn enemas.  Reglan for gut motility    ***Renal***  Renal failure - UF again yesterday  HD today   Replete electrolytes as indicated.        ***ID***  Candidemia - On Caspofungin  Cultures from 1/2 with staph epi, likely contaminant, no further Vancomycin.  Follow up repeat blood cultures on 1/3 - NGTD     ***Endocrine***  DM with hyperglycemia - Insulin sliding scale, NPH.    ***Hematology***  Acute blood loss anemia - no transfusion indication currently, will trend.  SQ Heparin for VTE prophylaxis          Patient requires continuous monitoring with bedside rhythm monitoring, pulse oximetry monitoring, and continuous central venous and arterial pressure monitoring; and intermittent blood gas analysis. Care plan discussed with the ICU care team.   Patient remained critical, at risk for life threatening decompensation.    I have spent 30 minutes providing critical care management to this patient.    By signing my name below, I, Leela Avila, attest that this documentation has been prepared under the direction and in the presence of Jade Rosas MD  Electronically signed: Brian Oakes, 01-06-23 @ 06:10    Jade MANN MD, personally performed the services described in this documentation. all medical record entries made by the karlaibe were at my direction and in my presence. I have reviewed the chart and agree that the record reflects my personal performance and is accurate and complete  Electronically signed: Jade Rosas MD Patient seen and examined at the bedside.    Remains critically ill on continuous ICU monitoring.      Brief Summary:  63 y/o male with PMH of CABG, DM, HTN, HLD presenting with a STEMI.   He was in cardiogenic shock requiring VA ECMO, respiratory failure requiring tracheostomy, and renal failure requiring RRT.    24 Hour events:  - Remains on trach collar.  - UF yesterday, plan for HD today.    Objective:  Vital Signs Last 24 Hrs  T(C): 36.6 (06 Jan 2023 04:00), Max: 37.2 (05 Jan 2023 20:00)  T(F): 97.9 (06 Jan 2023 04:00), Max: 99 (05 Jan 2023 20:00)  HR: 109 (06 Jan 2023 04:45) (90 - 116)  BP: 96/68 (05 Jan 2023 16:30) (96/68 - 96/68)  BP(mean): 78 (05 Jan 2023 16:30) (78 - 78)  RR: 24 (06 Jan 2023 04:45) (7 - 42)  SpO2: 97% (06 Jan 2023 04:45) (93% - 100%)    Parameters below as of 06 Jan 2023 04:00  Patient On (Oxygen Delivery Method): tracheostomy collar    O2 Concentration (%): 40             Physical Exam:   General: Sitting in chair, no acute distress   Neurology: Following commands, alert and interactive  Eyes: Bilateral pupils reactive   ENT/Neck: Trach collar  Respiratory: Breath sounds bilaterally  CV: Sinus Tachycardia  Abdominal: Soft, nontender  Extremities: Warm         -------------------------------------------------------------------------------------------------------------------------------             Labs:             9.3    13.93 )-----------( 139      ( 06 Jan 2023 00:38 )             30.3     01-06    134<L>  |  94<L>  |  68<H>  ----------------------------<  131<H>  5.0   |  25  |  3.80<H>    Ca    8.8      06 Jan 2023 00:38  Phos  5.4     01-06  Mg     2.9     01-06    TPro  7.5  /  Alb  3.5  /  TBili  1.0  /  DBili  x   /  AST  28  /  ALT  16  /  AlkPhos  77  01-06    LIVER FUNCTIONS - ( 06 Jan 2023 00:38 )  Alb: 3.5 g/dL / Pro: 7.5 g/dL / ALK PHOS: 77 U/L / ALT: 16 U/L / AST: 28 U/L / GGT: x             ABG - ( 06 Jan 2023 03:01 )  pH, Arterial: 7.48  pH, Blood: x     /  pCO2: 35    /  pO2: 75    / HCO3: 26    / Base Excess: 2.6   /  SaO2: 96.2          ------------------------------------------------------------------------------------------------------------------------------    Assessment:  63 y/o M admitted with a STEMI and cardiogenic shock.     Cardiogenic shock s/p VA ECMO decannulated 12/8  Mitral regurgitation s/p Mitral clip x1 12/16/22  Acute respiratory distress/failure s/p Tracheostomy 12/16/22     At high risk for hemodynamic instability  ERIC/Renal failure  Fungemia  Anemia      Plan:   ***Neuro***  PT ongoing.  Pain control with prns  Optimize day/night cycles.    ***Cardiovascular***  At high risk for hemodynamic instability and cardiac arrhythmias.  Midodrine to support blood pressure.  Continue Amiodarone  ASA /Statin daily    ***Pulmonary***  Respiratory failure s/p tracheostomy  Trach collar as tolerated.  Deep breathing and coughing exercises.  Wean oxygen as able.    ***GI***  Protein calorie malnutrition - Tube feeds via nasal feeding tube  Protonix for GI prophylaxis   Bowel regimen     ***Renal***  Renal failure and Hyperkalemia - HD and UF today.  Replete electrolytes as indicated.    ***ID***  Candidemia - On Caspofungin    ***Endocrine***  DM with hyperglycemia - Insulin sliding scale, NPH.    ***Hematology***  Acute blood loss anemia - no transfusion indication currently, will trend.  SQ Heparin for VTE prophylaxis          Patient requires continuous monitoring with bedside rhythm monitoring, pulse oximetry monitoring, and continuous central venous and arterial pressure monitoring; and intermittent blood gas analysis. Care plan discussed with the ICU care team.   Patient remained critical, at risk for life threatening decompensation.    I have spent 35 minutes providing critical care management to this patient.    By signing my name below, I, Leela Avila, attest that this documentation has been prepared under the direction and in the presence of Jade Rosas MD  Electronically signed: Brian Oakes, 01-06-23 @ 06:10    I, Jade Rosas MD, personally performed the services described in this documentation. all medical record entries made by the scribe were at my direction and in my presence. I have reviewed the chart and agree that the record reflects my personal performance and is accurate and complete  Electronically signed: Jade Rosas MD Patient seen and examined at the bedside.    Remains critically ill on continuous ICU monitoring.      Brief Summary:  61 y/o male with PMH of CABG, DM, HTN, HLD presenting with a STEMI.   He was in cardiogenic shock requiring VA ECMO, respiratory failure requiring tracheostomy, and renal failure requiring RRT.    24 Hour events:  - Remains on trach collar.  - UF yesterday, plan for HD today.    Objective:  Vital Signs Last 24 Hrs  T(C): 36.6 (06 Jan 2023 04:00), Max: 37.2 (05 Jan 2023 20:00)  T(F): 97.9 (06 Jan 2023 04:00), Max: 99 (05 Jan 2023 20:00)  HR: 109 (06 Jan 2023 04:45) (90 - 116)  BP: 96/68 (05 Jan 2023 16:30) (96/68 - 96/68)  BP(mean): 78 (05 Jan 2023 16:30) (78 - 78)  RR: 24 (06 Jan 2023 04:45) (7 - 42)  SpO2: 97% (06 Jan 2023 04:45) (93% - 100%)    Parameters below as of 06 Jan 2023 04:00  Patient On (Oxygen Delivery Method): tracheostomy collar    O2 Concentration (%): 40             Physical Exam:   General: Sitting in chair, no acute distress   Neurology: Following commands, alert and interactive  Eyes: Bilateral pupils reactive   ENT/Neck: Trach collar  Respiratory: Breath sounds bilaterally  CV: Sinus Tachycardia  Abdominal: Soft, nontender  Extremities: Warm         -------------------------------------------------------------------------------------------------------------------------------             Labs:             9.3    13.93 )-----------( 139      ( 06 Jan 2023 00:38 )             30.3     01-06    134<L>  |  94<L>  |  68<H>  ----------------------------<  131<H>  5.0   |  25  |  3.80<H>    Ca    8.8      06 Jan 2023 00:38  Phos  5.4     01-06  Mg     2.9     01-06    TPro  7.5  /  Alb  3.5  /  TBili  1.0  /  DBili  x   /  AST  28  /  ALT  16  /  AlkPhos  77  01-06    LIVER FUNCTIONS - ( 06 Jan 2023 00:38 )  Alb: 3.5 g/dL / Pro: 7.5 g/dL / ALK PHOS: 77 U/L / ALT: 16 U/L / AST: 28 U/L / GGT: x             ABG - ( 06 Jan 2023 03:01 )  pH, Arterial: 7.48  pH, Blood: x     /  pCO2: 35    /  pO2: 75    / HCO3: 26    / Base Excess: 2.6   /  SaO2: 96.2          ------------------------------------------------------------------------------------------------------------------------------    Assessment:  61 y/o M admitted with a STEMI and cardiogenic shock.     Cardiogenic shock s/p VA ECMO decannulated 12/8  Mitral regurgitation s/p Mitral clip x1 12/16/22  Acute respiratory distress/failure s/p Tracheostomy 12/16/22     At high risk for hemodynamic instability  ERIC/Renal failure  Fungemia  Anemia      Plan:   ***Neuro***  PT ongoing.  Pain control with prns  Optimize day/night cycles.    ***Cardiovascular***  At high risk for hemodynamic instability and cardiac arrhythmias.  Midodrine to support blood pressure.  Continue Amiodarone  ASA /Statin daily    ***Pulmonary***  Respiratory failure s/p tracheostomy  Trach collar as tolerated.  Deep breathing and coughing exercises.  Wean oxygen as able.    ***GI***  Protein calorie malnutrition - Tube feeds via nasal feeding tube  Protonix for GI prophylaxis   Bowel regimen     ***Renal***  Renal failure and Hyperkalemia - HD and UF today.  Replete electrolytes as indicated.    ***ID***  Candidemia - On Caspofungin    ***Endocrine***  DM with hyperglycemia - Insulin sliding scale, NPH.    ***Hematology***  Acute blood loss anemia - no transfusion indication currently, will trend.  SQ Heparin for VTE prophylaxis          Patient requires continuous monitoring with bedside rhythm monitoring, pulse oximetry monitoring, and continuous central venous and arterial pressure monitoring; and intermittent blood gas analysis. Care plan discussed with the ICU care team.   Patient remained critical, at risk for life threatening decompensation.    I have spent 35 minutes providing critical care management to this patient.    By signing my name below, I, Leela Avila, attest that this documentation has been prepared under the direction and in the presence of Jade Rosas MD  Electronically signed: Brian Oakes, 01-06-23 @ 06:10    I, Jade Rosas MD, personally performed the services described in this documentation. all medical record entries made by the scribe were at my direction and in my presence. I have reviewed the chart and agree that the record reflects my personal performance and is accurate and complete  Electronically signed: Jade Rosas MD

## 2023-01-06 NOTE — PROGRESS NOTE ADULT - ASSESSMENT
62M CAD s/p CABG, DM, HTN, presented as a trasnfer from [place] for STEMI, found to have pancreatitis not necrotizing and cholecystitis on CT AP, admitted to to SICU for respiratory failure due to ARDS. Moved to the CTU for ECMO for cardiogenic shock vs ARDS, ECMO decannulated on 12/8 with IABP placement and mitral clip and removed on 12/17, s/p trac on 12/16/22, now on trach collar.    #Candidemia  Unclear source  Bcx positive for candida tropicalis on 12/26/22  Repeat with no growth to date  Limited TTE shows no evidence for valvular vegetations, mitral clip in place    #ESRD on HD  Nephrology following    shiley in place      Continue caspofungin 50mg daily   The JUAN of organism is on the high side against diflucan    isolated CNS in BC is a contaminate  eye exam down the line if needed      Follow repeat blood cultures - negative so far    needs at least two full weeks of caspo    chest xray looks worse but no other signs of pna and sputum unimpressive  suspect PVC  to follow  alert out of bed  seem   stable                                 62M CAD s/p CABG, DM, HTN, presented as a trasnfer from [place] for STEMI, found to have pancreatitis not necrotizing and cholecystitis on CT AP, admitted to to SICU for respiratory failure due to ARDS. Moved to the CTU for ECMO for cardiogenic shock vs ARDS, ECMO decannulated on 12/8 with IABP placement and mitral clip and removed on 12/17, s/p trac on 12/16/22, now on trach collar.    #Candidemia  Unclear source  Bcx positive for candida tropicalis on 12/26/22  Repeat with no growth to date  Limited TTE shows no evidence for valvular vegetations, mitral clip in place    #ESRD on HD  Nephrology following    shiley in place      Continue caspofungin 50mg daily   The JUAN of organism is on the high side against diflucan    isolated CNS in BC is a contaminate  eye exam down the line if needed      Follow repeat blood cultures - negative so far    needs at least two full weeks of caspo    chest xray looks worse but no other signs of pna and sputum unimpressive  suspect PVC  to follow  alert out of bed  seem   stable                                 62M CAD s/p CABG, DM, HTN, presented as a trasnfer from [place] for STEMI, found to have pancreatitis not necrotizing and cholecystitis on CT AP, admitted to to SICU for respiratory failure due to ARDS. Moved to the CTU for ECMO for cardiogenic shock vs ARDS, ECMO decannulated on 12/8 with IABP placement and mitral clip and removed on 12/17, s/p trac on 12/16/22, now on trach collar.    #Candidemia  Unclear source  Bcx positive for candida tropicalis on 12/26/22  Repeat with no growth to date  Limited TTE shows no evidence for valvular vegetations, mitral clip in place    #ESRD on HD  Nephrology following    shiley in place      Continue caspofungin 50mg daily   The JUAN of organism is on the high side against diflucan    isolated CNS in BC is a contaminate  eye exam down the line if needed      Follow repeat blood cultures - negative so far    needs at least two full weeks of caspo    chest xray looks worse but no other signs of pna and sputum unimpressive  suspect PVC  to follow  alert out of bed  seem   stable

## 2023-01-06 NOTE — PROGRESS NOTE ADULT - PROBLEM SELECTOR PLAN 3
- troponin peaked at > 52678   - [facility] 12/06 with IABP placement revealed that 3 grafts are closed w/ distal native disease from remaining LAD graft  - continue statin and aspirin.  - eventual beta blocker, however will defer for now as he just came off vasopressors and is still on midodrine - troponin peaked at > 83270   - [facility] 12/06 with IABP placement revealed that 3 grafts are closed w/ distal native disease from remaining LAD graft  - continue statin and aspirin.  - eventual beta blocker, however will defer for now as he just came off vasopressors and is still on midodrine - troponin peaked at > 75686   - [facility] 12/06 with IABP placement revealed that 3 grafts are closed w/ distal native disease from remaining LAD graft  - continue statin and aspirin.  - eventual beta blocker, however will defer for now as he just came off vasopressors and is still on midodrine

## 2023-01-06 NOTE — PROGRESS NOTE ADULT - SUBJECTIVE AND OBJECTIVE BOX
Subjective:  - weaned off chace as of  1800.  - OOB in chair  - s/p UF  with 1500ml removed  - denies SOB at rest, lightheadedness, CP, palpitations    Medications:  acetaminophen    Suspension .. 650 milliGRAM(s) Oral every 6 hours PRN  aMIOdarone    Tablet 200 milliGRAM(s) Oral daily  aspirin  chewable 81 milliGRAM(s) Oral daily  atorvastatin 80 milliGRAM(s) Oral at bedtime  caspofungin IVPB 50 milliGRAM(s) IV Intermittent every 24 hours  caspofungin IVPB      chlorhexidine 0.12% Liquid 15 milliLiter(s) Oral Mucosa every 12 hours  chlorhexidine 2% Cloths 1 Application(s) Topical <User Schedule>  dextrose 5%. 1000 milliLiter(s) IV Continuous <Continuous>  dextrose 50% Injectable 50 milliLiter(s) IV Push every 15 minutes  dextrose Oral Gel 15 Gram(s) Oral once PRN  epoetin teena-epbx (RETACRIT) Injectable 5000 Unit(s) IV Push <User Schedule>  glucagon  Injectable 1 milliGRAM(s) IntraMuscular once  heparin   Injectable 5000 Unit(s) SubCutaneous every 8 hours  insulin lispro (ADMELOG) corrective regimen sliding scale   SubCutaneous every 4 hours  insulin NPH human recombinant 7 Unit(s) SubCutaneous every 6 hours  metoclopramide Injectable 5 milliGRAM(s) IV Push every 8 hours  midodrine 10 milliGRAM(s) Oral every 8 hours  multivitamin/minerals/iron Oral Solution (CENTRUM) 15 milliLiter(s) Oral daily  pantoprazole  Injectable 40 milliGRAM(s) IV Push daily  phenylephrine    Infusion 0.287 MICROgram(s)/kG/Min IV Continuous <Continuous>  polyethylene glycol 3350 17 Gram(s) Oral daily  senna 2 Tablet(s) Oral at bedtime  sevelamer carbonate Powder 800 milliGRAM(s) Oral three times a day with meals  simethicone 80 milliGRAM(s) Chew daily PRN  sodium chloride 0.9% lock flush 10 milliLiter(s) IV Push every 1 hour PRN  thiamine 100 milliGRAM(s) Enteral Tube daily      Physical Exam:    Vitals:  Vital Signs Last 24 Hours  T(C): 36.7 (23 @ 16:00), Max: 37.2 (23 @ 20:00)  HR: 107 (23 @ 16:00) (102 - 116)  BP: 120//51  RR: 20 (23 @ 16:00) (7 - 40)  SpO2: 97% (23 @ 16:00) (92% - 100%)    Weight in k.2 ( @ 00:00)    I&O's Summary    2023 07:01  -  2023 07:00  --------------------------------------------------------  IN: 2150 mL / OUT: 1820 mL / NET: 330 mL    2023 07:01  -  2023 16:43  --------------------------------------------------------  IN: 685 mL / OUT: 120 mL / NET: 565 mL    Tele: ST    General: No distress. Comfortable.  HEENT: EOM intact. Trach midline, TC in place  Neck: Neck supple. JVP mildly elevated ~ 8-10cm H2O. No masses  Chest: Clear to auscultation bilaterally  CV: Normal S1 and S2. No murmurs, rub, or gallops. Radial pulses normal. No LE edema  Abdomen: Soft, non-distended, non-tender, nomoactive BS  Skin: No rashes or skin breakdown. warm peripherally  Neurology: Alert and oriented times three. Sensation intact  Psych: Affect normal    Labs:                        9.3    13.93 )-----------( 139      ( 2023 00:38 )             30.3     -06    134<L>  |  94<L>  |  68<H>  ----------------------------<  131<H>  5.0   |  25  |  3.80<H>    Ca    8.8      2023 00:38  Phos  5.4     -  Mg     2.9         TPro  7.5  /  Alb  3.5  /  TBili  1.0  /  DBili  x   /  AST  28  /  ALT  16  /  AlkPhos  77               ADVANCED HEART FAILURE & TRANSPLANT  - PROGRESS NOTE  *To reach the NS1 Team from 8am to 5pm, please call [phone].    ___________________________________________________________________________    Subjective:  - weaned off chace as of  1800.  - OOB in chair  - s/p UF  with 1500ml removed  - denies SOB at rest, lightheadedness, CP, palpitations    Medications:  acetaminophen    Suspension .. 650 milliGRAM(s) Oral every 6 hours PRN  aMIOdarone    Tablet 200 milliGRAM(s) Oral daily  aspirin  chewable 81 milliGRAM(s) Oral daily  atorvastatin 80 milliGRAM(s) Oral at bedtime  caspofungin IVPB 50 milliGRAM(s) IV Intermittent every 24 hours  caspofungin IVPB      chlorhexidine 0.12% Liquid 15 milliLiter(s) Oral Mucosa every 12 hours  chlorhexidine 2% Cloths 1 Application(s) Topical <User Schedule>  dextrose 5%. 1000 milliLiter(s) IV Continuous <Continuous>  dextrose 50% Injectable 50 milliLiter(s) IV Push every 15 minutes  dextrose Oral Gel 15 Gram(s) Oral once PRN  epoetin teena-epbx (RETACRIT) Injectable 5000 Unit(s) IV Push <User Schedule>  glucagon  Injectable 1 milliGRAM(s) IntraMuscular once  heparin   Injectable 5000 Unit(s) SubCutaneous every 8 hours  insulin lispro (ADMELOG) corrective regimen sliding scale   SubCutaneous every 4 hours  insulin NPH human recombinant 7 Unit(s) SubCutaneous every 6 hours  metoclopramide Injectable 5 milliGRAM(s) IV Push every 8 hours  midodrine 10 milliGRAM(s) Oral every 8 hours  multivitamin/minerals/iron Oral Solution (CENTRUM) 15 milliLiter(s) Oral daily  pantoprazole  Injectable 40 milliGRAM(s) IV Push daily  phenylephrine    Infusion 0.287 MICROgram(s)/kG/Min IV Continuous <Continuous>  polyethylene glycol 3350 17 Gram(s) Oral daily  senna 2 Tablet(s) Oral at bedtime  sevelamer carbonate Powder 800 milliGRAM(s) Oral three times a day with meals  simethicone 80 milliGRAM(s) Chew daily PRN  sodium chloride 0.9% lock flush 10 milliLiter(s) IV Push every 1 hour PRN  thiamine 100 milliGRAM(s) Enteral Tube daily      Physical Exam:    Vitals:  Vital Signs Last 24 Hours  T(C): 36.7 (23 @ 16:00), Max: 37.2 (23 @ 20:00)  HR: 107 (23 @ 16:00) (102 - 116)  BP: 120//51  RR: 20 (23 @ 16:00) (7 - 40)  SpO2: 97% (23 @ 16:00) (92% - 100%)    Weight in k.2 ( @ 00:00)    I&O's Summary    2023 07:  -  2023 07:00  --------------------------------------------------------  IN: 2150 mL / OUT: 1820 mL / NET: 330 mL    2023 07:  -  2023 16:43  --------------------------------------------------------  IN: 685 mL / OUT: 120 mL / NET: 565 mL    Tele: ST    General: No distress. Comfortable.  HEENT: EOM intact. Trach midline, TC in place  Neck: Neck supple. JVP mildly elevated ~ 8-10cm H2O. No masses  Chest: Clear to auscultation bilaterally  CV: Normal S1 and S2. No murmurs, rub, or gallops. Radial pulses normal. No LE edema  Abdomen: Soft, non-distended, non-tender, nomoactive BS  Skin: No rashes or skin breakdown. warm peripherally  Neurology: Alert and oriented times three. Sensation intact  Psych: Affect normal    Labs:                        9.3    13.93 )-----------( 139      ( 2023 00:38 )             30.3     -    134<L>  |  94<L>  |  68<H>  ----------------------------<  131<H>  5.0   |  25  |  3.80<H>    Ca    8.8      2023 00:38  Phos  5.4     -  Mg     2.9         TPro  7.5  /  Alb  3.5  /  TBili  1.0  /  DBili  x   /  AST  28  /  ALT  16  /  AlkPhos  77               ADVANCED HEART FAILURE & TRANSPLANT  - PROGRESS NOTE  *To reach the NS1 Team from 8am to 5pm, please call [phone].    ___________________________________________________________________________    Subjective:  - weaned off chace as of  1800.  - OOB in chair  - s/p UF  with 1500ml removed  - denies SOB at rest, lightheadedness, CP, palpitations    Medications:  acetaminophen    Suspension .. 650 milliGRAM(s) Oral every 6 hours PRN  aMIOdarone    Tablet 200 milliGRAM(s) Oral daily  aspirin  chewable 81 milliGRAM(s) Oral daily  atorvastatin 80 milliGRAM(s) Oral at bedtime  caspofungin IVPB 50 milliGRAM(s) IV Intermittent every 24 hours  caspofungin IVPB      chlorhexidine 0.12% Liquid 15 milliLiter(s) Oral Mucosa every 12 hours  chlorhexidine 2% Cloths 1 Application(s) Topical <User Schedule>  dextrose 5%. 1000 milliLiter(s) IV Continuous <Continuous>  dextrose 50% Injectable 50 milliLiter(s) IV Push every 15 minutes  dextrose Oral Gel 15 Gram(s) Oral once PRN  epoetin teena-epbx (RETACRIT) Injectable 5000 Unit(s) IV Push <User Schedule>  glucagon  Injectable 1 milliGRAM(s) IntraMuscular once  heparin   Injectable 5000 Unit(s) SubCutaneous every 8 hours  insulin lispro (ADMELOG) corrective regimen sliding scale   SubCutaneous every 4 hours  insulin NPH human recombinant 7 Unit(s) SubCutaneous every 6 hours  metoclopramide Injectable 5 milliGRAM(s) IV Push every 8 hours  midodrine 10 milliGRAM(s) Oral every 8 hours  multivitamin/minerals/iron Oral Solution (CENTRUM) 15 milliLiter(s) Oral daily  pantoprazole  Injectable 40 milliGRAM(s) IV Push daily  phenylephrine    Infusion 0.287 MICROgram(s)/kG/Min IV Continuous <Continuous>  polyethylene glycol 3350 17 Gram(s) Oral daily  senna 2 Tablet(s) Oral at bedtime  sevelamer carbonate Powder 800 milliGRAM(s) Oral three times a day with meals  simethicone 80 milliGRAM(s) Chew daily PRN  sodium chloride 0.9% lock flush 10 milliLiter(s) IV Push every 1 hour PRN  thiamine 100 milliGRAM(s) Enteral Tube daily      Physical Exam:    Vitals:  Vital Signs Last 24 Hours  T(C): 36.7 (23 @ 16:00), Max: 37.2 (23 @ 20:00)  HR: 107 (23 @ 16:00) (102 - 116)  BP: 120//51  RR: 20 (23 @ 16:00) (7 - 40)  SpO2: 97% (23 @ 16:00) (92% - 100%)    Weight in k.2 ( @ 00:00)    I&O's Summary    2023 07:  -  2023 07:00  --------------------------------------------------------  IN: 2150 mL / OUT: 1820 mL / NET: 330 mL    2023 07:  -  2023 16:43  --------------------------------------------------------  IN: 685 mL / OUT: 120 mL / NET: 565 mL    Tele: ST    General: No distress. Comfortable.  HEENT: EOM intact. Trach midline, TC in place  Neck: Neck supple. JVP mildly elevated ~ 8-10cm H2O. No masses  Chest: Clear to auscultation bilaterally  CV: Normal S1 and S2. No murmurs, rub, or gallops. Radial pulses normal. No LE edema  Abdomen: Soft, non-distended, non-tender, nomoactive BS  Skin: No rashes or skin breakdown. warm peripherally  Neurology: Alert and oriented times three. Sensation intact  Psych: Affect normal    Labs:                        9.3    13.93 )-----------( 139      ( 2023 00:38 )             30.3     -    134<L>  |  94<L>  |  68<H>  ----------------------------<  131<H>  5.0   |  25  |  3.80<H>    Ca    8.8      2023 00:38  Phos  5.4     -  Mg     2.9         TPro  7.5  /  Alb  3.5  /  TBili  1.0  /  DBili  x   /  AST  28  /  ALT  16  /  AlkPhos  77               ADVANCED HEART FAILURE & TRANSPLANT  - PROGRESS NOTE  *To reach the NS1 Team from 8am to 5pm, please call [phone].    ___________________________________________________________________________    Subjective:  - weaned off chace as of  1800.  - OOB in chair  - s/p UF  with 1500ml removed  - denies SOB at rest, lightheadedness, CP, palpitations    Medications:  acetaminophen    Suspension .. 650 milliGRAM(s) Oral every 6 hours PRN  aMIOdarone    Tablet 200 milliGRAM(s) Oral daily  aspirin  chewable 81 milliGRAM(s) Oral daily  atorvastatin 80 milliGRAM(s) Oral at bedtime  caspofungin IVPB 50 milliGRAM(s) IV Intermittent every 24 hours  caspofungin IVPB      chlorhexidine 0.12% Liquid 15 milliLiter(s) Oral Mucosa every 12 hours  chlorhexidine 2% Cloths 1 Application(s) Topical <User Schedule>  dextrose 5%. 1000 milliLiter(s) IV Continuous <Continuous>  dextrose 50% Injectable 50 milliLiter(s) IV Push every 15 minutes  dextrose Oral Gel 15 Gram(s) Oral once PRN  epoetin teena-epbx (RETACRIT) Injectable 5000 Unit(s) IV Push <User Schedule>  glucagon  Injectable 1 milliGRAM(s) IntraMuscular once  heparin   Injectable 5000 Unit(s) SubCutaneous every 8 hours  insulin lispro (ADMELOG) corrective regimen sliding scale   SubCutaneous every 4 hours  insulin NPH human recombinant 7 Unit(s) SubCutaneous every 6 hours  metoclopramide Injectable 5 milliGRAM(s) IV Push every 8 hours  midodrine 10 milliGRAM(s) Oral every 8 hours  multivitamin/minerals/iron Oral Solution (CENTRUM) 15 milliLiter(s) Oral daily  pantoprazole  Injectable 40 milliGRAM(s) IV Push daily  phenylephrine    Infusion 0.287 MICROgram(s)/kG/Min IV Continuous <Continuous>  polyethylene glycol 3350 17 Gram(s) Oral daily  senna 2 Tablet(s) Oral at bedtime  sevelamer carbonate Powder 800 milliGRAM(s) Oral three times a day with meals  simethicone 80 milliGRAM(s) Chew daily PRN  sodium chloride 0.9% lock flush 10 milliLiter(s) IV Push every 1 hour PRN  thiamine 100 milliGRAM(s) Enteral Tube daily      Physical Exam:    Vitals:  Vital Signs Last 24 Hours  T(C): 36.7 (23 @ 16:00), Max: 37.2 (23 @ 20:00)  HR: 107 (23 @ 16:00) (102 - 116)  BP: 120//51  RR: 20 (23 @ 16:00) (7 - 40)  SpO2: 97% (23 @ 16:00) (92% - 100%)    Weight in k.2 ( @ 00:00)    I&O's Summary    2023 07:  -  2023 07:00  --------------------------------------------------------  IN: 2150 mL / OUT: 1820 mL / NET: 330 mL    2023 07:  -  2023 16:43  --------------------------------------------------------  IN: 685 mL / OUT: 120 mL / NET: 565 mL    Tele: ST    General: No distress. Comfortable.  HEENT: EOM intact. Trach midline, TC in place  Neck: Neck supple. JVP mildly elevated ~ 8-10cm H2O. No masses  Chest: Clear to auscultation bilaterally  CV: Normal S1 and S2. No murmurs, rub, or gallops. Radial pulses normal. No LE edema  Abdomen: Soft, non-distended, non-tender, nomoactive BS  Skin: No rashes or skin breakdown. warm peripherally  Neurology: Alert and oriented times three. Sensation intact  Psych: Affect normal    Labs:                        9.3    13.93 )-----------( 139      ( 2023 00:38 )             30.3     -    134<L>  |  94<L>  |  68<H>  ----------------------------<  131<H>  5.0   |  25  |  3.80<H>    Ca    8.8      2023 00:38  Phos  5.4     -  Mg     2.9         TPro  7.5  /  Alb  3.5  /  TBili  1.0  /  DBili  x   /  AST  28  /  ALT  16  /  AlkPhos  77

## 2023-01-06 NOTE — PROGRESS NOTE ADULT - PROBLEM SELECTOR PLAN 1
Pt with non oliguric ERIC/ ATN in the setting of STEMI, cardiac arrest & cardiogenic shock  SCr on arrival was 2.15; trended down to hector 1.6 on 11/25; slowly trended up & increased to 3.95 11/28. Pt initiated on CRRT/CVVHDF to optimize volume and electrolytes on 12/5/22. Pt likely with ATN. Pt underwent decannulation of ECMO on 12/8/22 and was taken off CRRT.     Pt reinitiated on CRRT overnight on 12/9/22 for volume overload. s/p trach on 12/16. CRRT stopped again 12/19. Bladder scan daily. Now with [facility]. Off Lasix gtt.     Getting daily HD/UF. Remains on TC with FiO2 40%.  Last full HD session 1/4 with 2L UF. Uf session yesterday 1.5L removed.  CXR remains with b/l pulm vasc congestion. Will do HD today with 1.5-2L UF as tolerated as per discussion with CTU  Will need tunneled catheter placed for ongoing dialysis needs once cleared from infectious standpoint.   Continue to monitor for renal recovery.    Monitor labs and urine output. Avoid any potential nephrotoxins. Dose medications as per HD.

## 2023-01-06 NOTE — PROGRESS NOTE ADULT - SUBJECTIVE AND OBJECTIVE BOX
infectious diseases progress note:    Patient is a 62y old  Male who presents with a chief complaint of Acute cholecystitis, gallstone pancreatitis (06 Jan 2023 06:10)        Acute pancreatitis without infection or necrosis               Allergies    No Known Allergies    Intolerances        ANTIBIOTICS/RELEVANT:  antimicrobials  caspofungin IVPB 50 milliGRAM(s) IV Intermittent every 24 hours  caspofungin IVPB        immunologic:  epoetin teena-epbx (RETACRIT) Injectable 5000 Unit(s) IV Push <User Schedule>    OTHER:  acetaminophen    Suspension .. 650 milliGRAM(s) Oral every 6 hours PRN  aMIOdarone    Tablet 200 milliGRAM(s) Oral daily  aspirin  chewable 81 milliGRAM(s) Oral daily  atorvastatin 80 milliGRAM(s) Oral at bedtime  chlorhexidine 0.12% Liquid 15 milliLiter(s) Oral Mucosa every 12 hours  chlorhexidine 2% Cloths 1 Application(s) Topical <User Schedule>  dextrose 5%. 1000 milliLiter(s) IV Continuous <Continuous>  dextrose 50% Injectable 50 milliLiter(s) IV Push every 15 minutes  dextrose Oral Gel 15 Gram(s) Oral once PRN  glucagon  Injectable 1 milliGRAM(s) IntraMuscular once  heparin   Injectable 5000 Unit(s) SubCutaneous every 8 hours  insulin lispro (ADMELOG) corrective regimen sliding scale   SubCutaneous every 4 hours  insulin NPH human recombinant 7 Unit(s) SubCutaneous every 6 hours  metoclopramide Injectable 5 milliGRAM(s) IV Push every 8 hours  midodrine 20 milliGRAM(s) Oral every 8 hours  multivitamin/minerals/iron Oral Solution (CENTRUM) 15 milliLiter(s) Oral daily  pantoprazole  Injectable 40 milliGRAM(s) IV Push daily  phenylephrine    Infusion 0.287 MICROgram(s)/kG/Min IV Continuous <Continuous>  polyethylene glycol 3350 17 Gram(s) Oral daily  senna 2 Tablet(s) Oral at bedtime  sevelamer carbonate Powder 800 milliGRAM(s) Enteral Tube two times a day  simethicone 80 milliGRAM(s) Chew daily PRN  sodium chloride 0.9% lock flush 10 milliLiter(s) IV Push every 1 hour PRN  thiamine 100 milliGRAM(s) Enteral Tube daily      Objective:  Vital Signs Last 24 Hrs  T(C): 36.7 (06 Jan 2023 08:00), Max: 37.2 (05 Jan 2023 20:00)  T(F): 98.1 (06 Jan 2023 08:00), Max: 99 (05 Jan 2023 20:00)  HR: 106 (06 Jan 2023 08:00) (90 - 116)  BP: 96/68 (05 Jan 2023 16:30) (96/68 - 96/68)  BP(mean): 78 (05 Jan 2023 16:30) (78 - 78)  RR: 17 (06 Jan 2023 08:00) (7 - 42)  SpO2: 100% (06 Jan 2023 08:00) (93% - 100%)    Parameters below as of 06 Jan 2023 08:00  Patient On (Oxygen Delivery Method): tracheostomy collar  O2 Flow (L/min): 40         Eyes:INOCENCIO, EOMI  Ear/Nose/Throat: no oral lesion, no sinus tenderness on percussion	  Neck:no JVD, no lymphadenopathy, supple  Respiratory: CTA lore  Cardiovascular: S1S2 RRR, no murmurs  Gastrointestinal:soft, (+) BS, no HSM  Extremities:no e/e/c        LABS:                        9.3    13.93 )-----------( 139      ( 06 Jan 2023 00:38 )             30.3     01-06    134<L>  |  94<L>  |  68<H>  ----------------------------<  131<H>  5.0   |  25  |  3.80<H>    Ca    8.8      06 Jan 2023 00:38  Phos  5.4     01-06  Mg     2.9     01-06    TPro  7.5  /  Alb  3.5  /  TBili  1.0  /  DBili  x   /  AST  28  /  ALT  16  /  AlkPhos  77  01-06            MICROBIOLOGY:    RECENT CULTURES:  01-03 @ 21:09 .Blood Blood-Peripheral                No growth to date.    01-03 @ 20:45 .Blood Blood-Peripheral                No growth to date.    01-02 @ 01:01 .Blood Blood-Peripheral   PCR    Growth in anaerobic bottle: Gram Positive Cocci in Clusters    Blood Culture PCR  Blood Culture PCR     Growth in anaerobic bottle: Staphylococcus epidermidis  Coag Negative Staphylococcus  Single set isolate, possible contaminant. Contact  Microbiology if susceptibility testing clinically  indicated.  ***Blood Panel PCR results on this specimen are available  approximately 3 hours after the Gram stain result.***  Gram stain, PCR, and/or culture results may not always  correspond due to difference in methodologies.  ************************************************************  This PCR assay was performed by multiplex PCR. This  Assay tests for 66 bacterial and resistance gene targets.  Please refer to the [facility] test directory  at [email or web address] for details.    01-01 @ 17:45 .Sputum Sputum       Few polymorphonuclear leukocytes per low power field  Few Squamous epithelial cells per low power field  Rare Gram positive cocci in pairs per oil power field           Normal Respiratory Christy present    12-30 @ 14:53 Clean Catch Clean Catch (Midstream)                <10,000 CFU/mL Normal Urogenital Christy          RESPIRATORY CULTURES:              RADIOLOGY & ADDITIONAL STUDIES:        Pager [phone]  After 5 pm/weekends or if no response :[phone] infectious diseases progress note:    Patient is a 62y old  Male who presents with a chief complaint of Acute cholecystitis, gallstone pancreatitis (06 Jan 2023 06:10)        Acute pancreatitis without infection or necrosis               Allergies    No Known Allergies    Intolerances        ANTIBIOTICS/RELEVANT:  antimicrobials  caspofungin IVPB 50 milliGRAM(s) IV Intermittent every 24 hours  caspofungin IVPB        immunologic:  epoetin teena-epbx (RETACRIT) Injectable 5000 Unit(s) IV Push <User Schedule>    OTHER:  acetaminophen    Suspension .. 650 milliGRAM(s) Oral every 6 hours PRN  aMIOdarone    Tablet 200 milliGRAM(s) Oral daily  aspirin  chewable 81 milliGRAM(s) Oral daily  atorvastatin 80 milliGRAM(s) Oral at bedtime  chlorhexidine 0.12% Liquid 15 milliLiter(s) Oral Mucosa every 12 hours  chlorhexidine 2% Cloths 1 Application(s) Topical <User Schedule>  dextrose 5%. 1000 milliLiter(s) IV Continuous <Continuous>  dextrose 50% Injectable 50 milliLiter(s) IV Push every 15 minutes  dextrose Oral Gel 15 Gram(s) Oral once PRN  glucagon  Injectable 1 milliGRAM(s) IntraMuscular once  heparin   Injectable 5000 Unit(s) SubCutaneous every 8 hours  insulin lispro (ADMELOG) corrective regimen sliding scale   SubCutaneous every 4 hours  insulin NPH human recombinant 7 Unit(s) SubCutaneous every 6 hours  metoclopramide Injectable 5 milliGRAM(s) IV Push every 8 hours  midodrine 20 milliGRAM(s) Oral every 8 hours  multivitamin/minerals/iron Oral Solution (CENTRUM) 15 milliLiter(s) Oral daily  pantoprazole  Injectable 40 milliGRAM(s) IV Push daily  phenylephrine    Infusion 0.287 MICROgram(s)/kG/Min IV Continuous <Continuous>  polyethylene glycol 3350 17 Gram(s) Oral daily  senna 2 Tablet(s) Oral at bedtime  sevelamer carbonate Powder 800 milliGRAM(s) Enteral Tube two times a day  simethicone 80 milliGRAM(s) Chew daily PRN  sodium chloride 0.9% lock flush 10 milliLiter(s) IV Push every 1 hour PRN  thiamine 100 milliGRAM(s) Enteral Tube daily      Objective:  Vital Signs Last 24 Hrs  T(C): 36.7 (06 Jan 2023 08:00), Max: 37.2 (05 Jan 2023 20:00)  T(F): 98.1 (06 Jan 2023 08:00), Max: 99 (05 Jan 2023 20:00)  HR: 106 (06 Jan 2023 08:00) (90 - 116)  BP: 96/68 (05 Jan 2023 16:30) (96/68 - 96/68)  BP(mean): 78 (05 Jan 2023 16:30) (78 - 78)  RR: 17 (06 Jan 2023 08:00) (7 - 42)  SpO2: 100% (06 Jan 2023 08:00) (93% - 100%)    Parameters below as of 06 Jan 2023 08:00  Patient On (Oxygen Delivery Method): tracheostomy collar  O2 Flow (L/min): 40         Eyes:INOCENCIO, EOMI  Ear/Nose/Throat: no oral lesion, no sinus tenderness on percussion	  Neck:no JVD, no lymphadenopathy, supple  Respiratory: CTA lore  Cardiovascular: S1S2 RRR, no murmurs  Gastrointestinal:soft, (+) BS, no HSM  Extremities:no e/e/c        LABS:                        9.3    13.93 )-----------( 139      ( 06 Jan 2023 00:38 )             30.3     01-06    134<L>  |  94<L>  |  68<H>  ----------------------------<  131<H>  5.0   |  25  |  3.80<H>    Ca    8.8      06 Jan 2023 00:38  Phos  5.4     01-06  Mg     2.9     01-06    TPro  7.5  /  Alb  3.5  /  TBili  1.0  /  DBili  x   /  AST  28  /  ALT  16  /  AlkPhos  77  01-06            MICROBIOLOGY:    RECENT CULTURES:  01-03 @ 21:09 .Blood Blood-Peripheral                No growth to date.    01-03 @ 20:45 .Blood Blood-Peripheral                No growth to date.    01-02 @ 01:01 .Blood Blood-Peripheral   PCR    Growth in anaerobic bottle: Gram Positive Cocci in Clusters    Blood Culture PCR  Blood Culture PCR     Growth in anaerobic bottle: Staphylococcus epidermidis  Coag Negative Staphylococcus  Single set isolate, possible contaminant. Contact  Microbiology if susceptibility testing clinically  indicated.  ***Blood Panel PCR results on this specimen are available  approximately 3 hours after the Gram stain result.***  Gram stain, PCR, and/or culture results may not always  correspond due to difference in methodologies.  ************************************************************  This PCR assay was performed by multiplex PCR. This  Assay tests for 66 bacterial and resistance gene targets.  Please refer to the [facility] test directory  at [email or web address] for details.    01-01 @ 17:45 .Sputum Sputum       Few polymorphonuclear leukocytes per low power field  Few Squamous epithelial cells per low power field  Rare Gram positive cocci in pairs per oil power field           Normal Respiratory Christy present    12-30 @ 14:53 Clean Catch Clean Catch (Midstream)                <10,000 CFU/mL Normal Urogenital Christy          RESPIRATORY CULTURES:              RADIOLOGY & ADDITIONAL STUDIES:        Pager [phone]  After 5 pm/weekends or if no response :[phone] infectious diseases progress note:    Patient is a 62y old  Male who presents with a chief complaint of Acute cholecystitis, gallstone pancreatitis (06 Jan 2023 06:10)        Acute pancreatitis without infection or necrosis               Allergies    No Known Allergies    Intolerances        ANTIBIOTICS/RELEVANT:  antimicrobials  caspofungin IVPB 50 milliGRAM(s) IV Intermittent every 24 hours  caspofungin IVPB        immunologic:  epoetin teena-epbx (RETACRIT) Injectable 5000 Unit(s) IV Push <User Schedule>    OTHER:  acetaminophen    Suspension .. 650 milliGRAM(s) Oral every 6 hours PRN  aMIOdarone    Tablet 200 milliGRAM(s) Oral daily  aspirin  chewable 81 milliGRAM(s) Oral daily  atorvastatin 80 milliGRAM(s) Oral at bedtime  chlorhexidine 0.12% Liquid 15 milliLiter(s) Oral Mucosa every 12 hours  chlorhexidine 2% Cloths 1 Application(s) Topical <User Schedule>  dextrose 5%. 1000 milliLiter(s) IV Continuous <Continuous>  dextrose 50% Injectable 50 milliLiter(s) IV Push every 15 minutes  dextrose Oral Gel 15 Gram(s) Oral once PRN  glucagon  Injectable 1 milliGRAM(s) IntraMuscular once  heparin   Injectable 5000 Unit(s) SubCutaneous every 8 hours  insulin lispro (ADMELOG) corrective regimen sliding scale   SubCutaneous every 4 hours  insulin NPH human recombinant 7 Unit(s) SubCutaneous every 6 hours  metoclopramide Injectable 5 milliGRAM(s) IV Push every 8 hours  midodrine 20 milliGRAM(s) Oral every 8 hours  multivitamin/minerals/iron Oral Solution (CENTRUM) 15 milliLiter(s) Oral daily  pantoprazole  Injectable 40 milliGRAM(s) IV Push daily  phenylephrine    Infusion 0.287 MICROgram(s)/kG/Min IV Continuous <Continuous>  polyethylene glycol 3350 17 Gram(s) Oral daily  senna 2 Tablet(s) Oral at bedtime  sevelamer carbonate Powder 800 milliGRAM(s) Enteral Tube two times a day  simethicone 80 milliGRAM(s) Chew daily PRN  sodium chloride 0.9% lock flush 10 milliLiter(s) IV Push every 1 hour PRN  thiamine 100 milliGRAM(s) Enteral Tube daily      Objective:  Vital Signs Last 24 Hrs  T(C): 36.7 (06 Jan 2023 08:00), Max: 37.2 (05 Jan 2023 20:00)  T(F): 98.1 (06 Jan 2023 08:00), Max: 99 (05 Jan 2023 20:00)  HR: 106 (06 Jan 2023 08:00) (90 - 116)  BP: 96/68 (05 Jan 2023 16:30) (96/68 - 96/68)  BP(mean): 78 (05 Jan 2023 16:30) (78 - 78)  RR: 17 (06 Jan 2023 08:00) (7 - 42)  SpO2: 100% (06 Jan 2023 08:00) (93% - 100%)    Parameters below as of 06 Jan 2023 08:00  Patient On (Oxygen Delivery Method): tracheostomy collar  O2 Flow (L/min): 40         Eyes:INOCENCIO, EOMI  Ear/Nose/Throat: no oral lesion, no sinus tenderness on percussion	  Neck:no JVD, no lymphadenopathy, supple  Respiratory: CTA lore  Cardiovascular: S1S2 RRR, no murmurs  Gastrointestinal:soft, (+) BS, no HSM  Extremities:no e/e/c        LABS:                        9.3    13.93 )-----------( 139      ( 06 Jan 2023 00:38 )             30.3     01-06    134<L>  |  94<L>  |  68<H>  ----------------------------<  131<H>  5.0   |  25  |  3.80<H>    Ca    8.8      06 Jan 2023 00:38  Phos  5.4     01-06  Mg     2.9     01-06    TPro  7.5  /  Alb  3.5  /  TBili  1.0  /  DBili  x   /  AST  28  /  ALT  16  /  AlkPhos  77  01-06            MICROBIOLOGY:    RECENT CULTURES:  01-03 @ 21:09 .Blood Blood-Peripheral                No growth to date.    01-03 @ 20:45 .Blood Blood-Peripheral                No growth to date.    01-02 @ 01:01 .Blood Blood-Peripheral   PCR    Growth in anaerobic bottle: Gram Positive Cocci in Clusters    Blood Culture PCR  Blood Culture PCR     Growth in anaerobic bottle: Staphylococcus epidermidis  Coag Negative Staphylococcus  Single set isolate, possible contaminant. Contact  Microbiology if susceptibility testing clinically  indicated.  ***Blood Panel PCR results on this specimen are available  approximately 3 hours after the Gram stain result.***  Gram stain, PCR, and/or culture results may not always  correspond due to difference in methodologies.  ************************************************************  This PCR assay was performed by multiplex PCR. This  Assay tests for 66 bacterial and resistance gene targets.  Please refer to the [facility] test directory  at [email or web address] for details.    01-01 @ 17:45 .Sputum Sputum       Few polymorphonuclear leukocytes per low power field  Few Squamous epithelial cells per low power field  Rare Gram positive cocci in pairs per oil power field           Normal Respiratory Christy present    12-30 @ 14:53 Clean Catch Clean Catch (Midstream)                <10,000 CFU/mL Normal Urogenital Christy          RESPIRATORY CULTURES:              RADIOLOGY & ADDITIONAL STUDIES:        Pager [phone]  After 5 pm/weekends or if no response :[phone]

## 2023-01-06 NOTE — PROGRESS NOTE ADULT - SUBJECTIVE AND OBJECTIVE BOX
[facility] Division of Kidney Diseases & Hypertension  FOLLOW UP NOTE  [phone]--------------------------------------------------------------------------------  Chief Complaint:Acute pancreatitis without infection or necrosis      HPI: 61 y/o male with PMH of CABG, DM, HTN, HLD presenting as transfer from [place] for c/f STEMI. Course c/b gallstone pancreatitis leading to ARDS and shock & cardiac arrest now on [facility] ECMO. Had significant troponin elevation and his LVEF down to 8%. Pt was deemed to be too unstable to have an angiogram and potential PCI due to his co-morbidities & a new subdural bleed. Pt being seen for ERIC. SCr on arrival was 2.15; trended down to hector 1.6 on 11/25 and eventually increased to 3.95 11/28. Pt received IV diuretics as per CTU. Pt initiated on CRRT on 12/5/22 to optimize volume status and electrolytes.  Pt underwent decannulation of ECMO on 12/8/22. Pt was restarted on 12/9/22 for volume overload. Pt underwent mitral clip OR (12/16/22). s/p trach on 12/16       24 hour events/subjective: Patient seen & examined. Labs & vitals reviewed. Remains on TC with FiO2 40% all night. Bp stable, off pressors. Last HD session 1/4 with 2L UF. Uf session yesterday 1.5L  CXR with b/l pulm vasc congestion. UO in the last 24 hours 300cc. Net neg 600cc in 24 hrs              PAST HISTORY  --------------------------------------------------------------------------------  No significant changes to PMH, PSH, FHx, SHx, unless otherwise noted    ALLERGIES & MEDICATIONS  --------------------------------------------------------------------------------  Allergies    No Known Allergies    Intolerances      Standing Inpatient Medications  aMIOdarone    Tablet 200 milliGRAM(s) Oral daily  aspirin  chewable 81 milliGRAM(s) Oral daily  atorvastatin 80 milliGRAM(s) Oral at bedtime  caspofungin IVPB 50 milliGRAM(s) IV Intermittent every 24 hours  caspofungin IVPB      chlorhexidine 0.12% Liquid 15 milliLiter(s) Oral Mucosa every 12 hours  chlorhexidine 2% Cloths 1 Application(s) Topical <User Schedule>  dextrose 5%. 1000 milliLiter(s) IV Continuous <Continuous>  dextrose 50% Injectable 50 milliLiter(s) IV Push every 15 minutes  epoetin teena-epbx (RETACRIT) Injectable 5000 Unit(s) IV Push <User Schedule>  glucagon  Injectable 1 milliGRAM(s) IntraMuscular once  heparin   Injectable 5000 Unit(s) SubCutaneous every 8 hours  insulin lispro (ADMELOG) corrective regimen sliding scale   SubCutaneous every 4 hours  insulin NPH human recombinant 7 Unit(s) SubCutaneous every 6 hours  metoclopramide Injectable 5 milliGRAM(s) IV Push every 8 hours  midodrine 20 milliGRAM(s) Oral every 8 hours  multivitamin/minerals/iron Oral Solution (CENTRUM) 15 milliLiter(s) Oral daily  pantoprazole  Injectable 40 milliGRAM(s) IV Push daily  phenylephrine    Infusion 0.287 MICROgram(s)/kG/Min IV Continuous <Continuous>  polyethylene glycol 3350 17 Gram(s) Oral daily  senna 2 Tablet(s) Oral at bedtime  sevelamer carbonate Powder 800 milliGRAM(s) Enteral Tube two times a day  thiamine 100 milliGRAM(s) Enteral Tube daily    PRN Inpatient Medications  acetaminophen    Suspension .. 650 milliGRAM(s) Oral every 6 hours PRN  dextrose Oral Gel 15 Gram(s) Oral once PRN  simethicone 80 milliGRAM(s) Chew daily PRN  sodium chloride 0.9% lock flush 10 milliLiter(s) IV Push every 1 hour PRN      REVIEW OF SYSTEMS  --------------------------------------------------------------------------------  Gen: No chills  Respiratory: No dyspnea, cough  CV: No chest pain  GI: No abdominal pain, diarrhea,  nausea, vomiting  : No increased frequency, dysuria, hematuria  MSK:  no edema  Neuro: No dizziness/lightheadedness      All other systems were reviewed and are negative, except as noted.    VITALS/PHYSICAL EXAM  --------------------------------------------------------------------------------  T(C): 36.7 (01-06-23 @ 08:00), Max: 37.2 (01-05-23 @ 20:00)  HR: 105 (01-06-23 @ 10:00) (90 - 116)  BP: 96/68 (01-05-23 @ 16:30) (96/68 - 96/68)  RR: 11 (01-06-23 @ 10:00) (7 - 42)  SpO2: 97% (01-06-23 @ 10:00) (96% - 100%)  Wt(kg): --        01-05-23 @ 07:01  -  01-06-23 @ 07:00  --------------------------------------------------------  IN: 2150 mL / OUT: 1820 mL / NET: 330 mL    01-06-23 @ 07:01  -  01-06-23 @ 11:21  --------------------------------------------------------  IN: 195 mL / OUT: 25 mL / NET: 170 mL      Physical Exam:  	Gen: NAD, elderly  	HEENT: Anicteric, no JVD  	Pulm: CTA B/L  	CV: RRR, S1S2, no rub  	Back: No spinal or CVA tenderness  	Abd: +BS, soft, nontender/nondistended          No presacral edema  	: No suprapubic tenderness          Extremities: no bilateral LE edema, no asterixis          Neuro: Awake, alert  	Skin: Warm, without rashes  	Vascular access:    LABS/STUDIES  --------------------------------------------------------------------------------              9.3    13.93 >-----------<  139      [01-06-23 @ 00:38]              30.3     134  |  94  |  68  ----------------------------<  131      [01-06-23 @ 00:38]  5.0   |  25  |  3.80        Ca     8.8     [01-06-23 @ 00:38]      Mg     2.9     [01-06-23 @ 00:38]      Phos  5.4     [01-06-23 @ 00:38]    TPro  7.5  /  Alb  3.5  /  TBili  1.0  /  DBili  x   /  AST  28  /  ALT  16  /  AlkPhos  77  [01-06-23 @ 00:38]          Creatinine Trend:  SCr 3.80 [01-06 @ 00:38]  SCr 2.77 [01-05 @ 00:11]  SCr 3.71 [01-04 @ 00:27]  SCr 3.27 [01-03 @ 08:36]  SCr 2.98 [01-03 @ 00:07]        Iron 39, TIBC 222, %sat 18      [12-31-22 @ 06:41]  Ferritin 346      [12-31-22 @ 06:42]    HBsAg Nonreact      [01-04-23 @ 10:02]     [facility] Division of Kidney Diseases & Hypertension  FOLLOW UP NOTE  [phone]--------------------------------------------------------------------------------  Chief Complaint:Acute pancreatitis without infection or necrosis      HPI: 61 y/o male with PMH of CABG, DM, HTN, HLD presenting as transfer from [place] for c/f STEMI. Course c/b gallstone pancreatitis leading to ARDS and shock & cardiac arrest now on [facility] ECMO. Had significant troponin elevation and his LVEF down to 8%. Pt was deemed to be too unstable to have an angiogram and potential PCI due to his co-morbidities & a new subdural bleed. Pt being seen for ERIC. SCr on arrival was 2.15; trended down to hector 1.6 on 11/25 and eventually increased to 3.95 11/28. Pt received IV diuretics as per CTU. Pt initiated on CRRT on 12/5/22 to optimize volume status and electrolytes.  Pt underwent decannulation of ECMO on 12/8/22. Pt was restarted on 12/9/22 for volume overload. Pt underwent mitral clip OR (12/16/22). s/p trach on 12/16       24 hour events/subjective: Patient seen & examined. Labs & vitals reviewed. Remains on TC with FiO2 40% all night. Bp stable, off pressors. Last HD session 1/4 with 2L UF. Uf session yesterday 1.5L  CXR with b/l pulm vasc congestion. UO in the last 24 hours 300cc. Net neg 600cc in 24 hrs              PAST HISTORY  --------------------------------------------------------------------------------  No significant changes to PMH, PSH, FHx, SHx, unless otherwise noted    ALLERGIES & MEDICATIONS  --------------------------------------------------------------------------------  Allergies    No Known Allergies    Intolerances      Standing Inpatient Medications  aMIOdarone    Tablet 200 milliGRAM(s) Oral daily  aspirin  chewable 81 milliGRAM(s) Oral daily  atorvastatin 80 milliGRAM(s) Oral at bedtime  caspofungin IVPB 50 milliGRAM(s) IV Intermittent every 24 hours  caspofungin IVPB      chlorhexidine 0.12% Liquid 15 milliLiter(s) Oral Mucosa every 12 hours  chlorhexidine 2% Cloths 1 Application(s) Topical <User Schedule>  dextrose 5%. 1000 milliLiter(s) IV Continuous <Continuous>  dextrose 50% Injectable 50 milliLiter(s) IV Push every 15 minutes  epoetin teena-epbx (RETACRIT) Injectable 5000 Unit(s) IV Push <User Schedule>  glucagon  Injectable 1 milliGRAM(s) IntraMuscular once  heparin   Injectable 5000 Unit(s) SubCutaneous every 8 hours  insulin lispro (ADMELOG) corrective regimen sliding scale   SubCutaneous every 4 hours  insulin NPH human recombinant 7 Unit(s) SubCutaneous every 6 hours  metoclopramide Injectable 5 milliGRAM(s) IV Push every 8 hours  midodrine 20 milliGRAM(s) Oral every 8 hours  multivitamin/minerals/iron Oral Solution (CENTRUM) 15 milliLiter(s) Oral daily  pantoprazole  Injectable 40 milliGRAM(s) IV Push daily  phenylephrine    Infusion 0.287 MICROgram(s)/kG/Min IV Continuous <Continuous>  polyethylene glycol 3350 17 Gram(s) Oral daily  senna 2 Tablet(s) Oral at bedtime  sevelamer carbonate Powder 800 milliGRAM(s) Enteral Tube two times a day  thiamine 100 milliGRAM(s) Enteral Tube daily    PRN Inpatient Medications  acetaminophen    Suspension .. 650 milliGRAM(s) Oral every 6 hours PRN  dextrose Oral Gel 15 Gram(s) Oral once PRN  simethicone 80 milliGRAM(s) Chew daily PRN  sodium chloride 0.9% lock flush 10 milliLiter(s) IV Push every 1 hour PRN      REVIEW OF SYSTEMS  --------------------------------------------------------------------------------  Gen: No chills  Respiratory: No dyspnea, cough  CV: No chest pain  GI: No abdominal pain, diarrhea,  nausea, vomiting  : No increased frequency, dysuria, hematuria  MSK:  no edema  Neuro: No dizziness/lightheadedness      All other systems were reviewed and are negative, except as noted.    VITALS/PHYSICAL EXAM  --------------------------------------------------------------------------------  T(C): 36.7 (01-06-23 @ 08:00), Max: 37.2 (01-05-23 @ 20:00)  HR: 105 (01-06-23 @ 10:00) (90 - 116)  BP: 96/68 (01-05-23 @ 16:30) (96/68 - 96/68)  RR: 11 (01-06-23 @ 10:00) (7 - 42)  SpO2: 97% (01-06-23 @ 10:00) (96% - 100%)  Wt(kg): --        01-05-23 @ 07:01  -  01-06-23 @ 07:00  --------------------------------------------------------  IN: 2150 mL / OUT: 1820 mL / NET: 330 mL    01-06-23 @ 07:01  -  01-06-23 @ 11:21  --------------------------------------------------------  IN: 195 mL / OUT: 25 mL / NET: 170 mL      Physical Exam:  	Gen: NAD, elderly  	HEENT: Anicteric, no JVD  	Pulm: CTA B/L  	CV: RRR, S1S2, no rub  	Back: No spinal or CVA tenderness  	Abd: +BS, soft, nontender/nondistended          No presacral edema  	: No suprapubic tenderness          Extremities: no bilateral LE edema, no asterixis          Neuro: Awake, alert  	Skin: Warm, without rashes  	Vascular access:    LABS/STUDIES  --------------------------------------------------------------------------------              9.3    13.93 >-----------<  139      [01-06-23 @ 00:38]              30.3     134  |  94  |  68  ----------------------------<  131      [01-06-23 @ 00:38]  5.0   |  25  |  3.80        Ca     8.8     [01-06-23 @ 00:38]      Mg     2.9     [01-06-23 @ 00:38]      Phos  5.4     [01-06-23 @ 00:38]    TPro  7.5  /  Alb  3.5  /  TBili  1.0  /  DBili  x   /  AST  28  /  ALT  16  /  AlkPhos  77  [01-06-23 @ 00:38]          Creatinine Trend:  SCr 3.80 [01-06 @ 00:38]  SCr 2.77 [01-05 @ 00:11]  SCr 3.71 [01-04 @ 00:27]  SCr 3.27 [01-03 @ 08:36]  SCr 2.98 [01-03 @ 00:07]        Iron 39, TIBC 222, %sat 18      [12-31-22 @ 06:41]  Ferritin 346      [12-31-22 @ 06:42]    HBsAg Nonreact      [01-04-23 @ 10:02]     [facility] Division of Kidney Diseases & Hypertension  FOLLOW UP NOTE  [phone]--------------------------------------------------------------------------------  Chief Complaint:Acute pancreatitis without infection or necrosis      HPI: 61 y/o male with PMH of CABG, DM, HTN, HLD presenting as transfer from [place] for c/f STEMI. Course c/b gallstone pancreatitis leading to ARDS and shock & cardiac arrest now on [facility] ECMO. Had significant troponin elevation and his LVEF down to 8%. Pt was deemed to be too unstable to have an angiogram and potential PCI due to his co-morbidities & a new subdural bleed. Pt being seen for ERIC. SCr on arrival was 2.15; trended down to hector 1.6 on 11/25 and eventually increased to 3.95 11/28. Pt received IV diuretics as per CTU. Pt initiated on CRRT on 12/5/22 to optimize volume status and electrolytes.  Pt underwent decannulation of ECMO on 12/8/22. Pt was restarted on 12/9/22 for volume overload. Pt underwent mitral clip OR (12/16/22). s/p trach on 12/16       24 hour events/subjective: Patient seen & examined. Labs & vitals reviewed. Remains on TC with FiO2 40% all night. Bp stable, off pressors. Last HD session 1/4 with 2L UF. Uf session yesterday 1.5L  CXR with b/l pulm vasc congestion. UO in the last 24 hours 300cc. Net neg 600cc in 24 hrs              PAST HISTORY  --------------------------------------------------------------------------------  No significant changes to PMH, PSH, FHx, SHx, unless otherwise noted    ALLERGIES & MEDICATIONS  --------------------------------------------------------------------------------  Allergies    No Known Allergies    Intolerances      Standing Inpatient Medications  aMIOdarone    Tablet 200 milliGRAM(s) Oral daily  aspirin  chewable 81 milliGRAM(s) Oral daily  atorvastatin 80 milliGRAM(s) Oral at bedtime  caspofungin IVPB 50 milliGRAM(s) IV Intermittent every 24 hours  caspofungin IVPB      chlorhexidine 0.12% Liquid 15 milliLiter(s) Oral Mucosa every 12 hours  chlorhexidine 2% Cloths 1 Application(s) Topical <User Schedule>  dextrose 5%. 1000 milliLiter(s) IV Continuous <Continuous>  dextrose 50% Injectable 50 milliLiter(s) IV Push every 15 minutes  epoetin teena-epbx (RETACRIT) Injectable 5000 Unit(s) IV Push <User Schedule>  glucagon  Injectable 1 milliGRAM(s) IntraMuscular once  heparin   Injectable 5000 Unit(s) SubCutaneous every 8 hours  insulin lispro (ADMELOG) corrective regimen sliding scale   SubCutaneous every 4 hours  insulin NPH human recombinant 7 Unit(s) SubCutaneous every 6 hours  metoclopramide Injectable 5 milliGRAM(s) IV Push every 8 hours  midodrine 20 milliGRAM(s) Oral every 8 hours  multivitamin/minerals/iron Oral Solution (CENTRUM) 15 milliLiter(s) Oral daily  pantoprazole  Injectable 40 milliGRAM(s) IV Push daily  phenylephrine    Infusion 0.287 MICROgram(s)/kG/Min IV Continuous <Continuous>  polyethylene glycol 3350 17 Gram(s) Oral daily  senna 2 Tablet(s) Oral at bedtime  sevelamer carbonate Powder 800 milliGRAM(s) Enteral Tube two times a day  thiamine 100 milliGRAM(s) Enteral Tube daily    PRN Inpatient Medications  acetaminophen    Suspension .. 650 milliGRAM(s) Oral every 6 hours PRN  dextrose Oral Gel 15 Gram(s) Oral once PRN  simethicone 80 milliGRAM(s) Chew daily PRN  sodium chloride 0.9% lock flush 10 milliLiter(s) IV Push every 1 hour PRN      REVIEW OF SYSTEMS  --------------------------------------------------------------------------------  Gen: No chills  Respiratory: No dyspnea, cough  CV: No chest pain  GI: No abdominal pain, diarrhea,  nausea, vomiting  : No increased frequency, dysuria, hematuria  MSK:  no edema  Neuro: No dizziness/lightheadedness      All other systems were reviewed and are negative, except as noted.    VITALS/PHYSICAL EXAM  --------------------------------------------------------------------------------  T(C): 36.7 (01-06-23 @ 08:00), Max: 37.2 (01-05-23 @ 20:00)  HR: 105 (01-06-23 @ 10:00) (90 - 116)  BP: 96/68 (01-05-23 @ 16:30) (96/68 - 96/68)  RR: 11 (01-06-23 @ 10:00) (7 - 42)  SpO2: 97% (01-06-23 @ 10:00) (96% - 100%)  Wt(kg): --        01-05-23 @ 07:01  -  01-06-23 @ 07:00  --------------------------------------------------------  IN: 2150 mL / OUT: 1820 mL / NET: 330 mL    01-06-23 @ 07:01  -  01-06-23 @ 11:21  --------------------------------------------------------  IN: 195 mL / OUT: 25 mL / NET: 170 mL      Physical Exam:  	Gen: NAD, elderly  	HEENT: Anicteric, no JVD  	Pulm: CTA B/L  	CV: RRR, S1S2, no rub  	Back: No spinal or CVA tenderness  	Abd: +BS, soft, nontender/nondistended          No presacral edema  	: No suprapubic tenderness          Extremities: no bilateral LE edema, no asterixis          Neuro: Awake, alert  	Skin: Warm, without rashes  	Vascular access:    LABS/STUDIES  --------------------------------------------------------------------------------              9.3    13.93 >-----------<  139      [01-06-23 @ 00:38]              30.3     134  |  94  |  68  ----------------------------<  131      [01-06-23 @ 00:38]  5.0   |  25  |  3.80        Ca     8.8     [01-06-23 @ 00:38]      Mg     2.9     [01-06-23 @ 00:38]      Phos  5.4     [01-06-23 @ 00:38]    TPro  7.5  /  Alb  3.5  /  TBili  1.0  /  DBili  x   /  AST  28  /  ALT  16  /  AlkPhos  77  [01-06-23 @ 00:38]          Creatinine Trend:  SCr 3.80 [01-06 @ 00:38]  SCr 2.77 [01-05 @ 00:11]  SCr 3.71 [01-04 @ 00:27]  SCr 3.27 [01-03 @ 08:36]  SCr 2.98 [01-03 @ 00:07]        Iron 39, TIBC 222, %sat 18      [12-31-22 @ 06:41]  Ferritin 346      [12-31-22 @ 06:42]    HBsAg Nonreact      [01-04-23 @ 10:02]     [facility] Division of Kidney Diseases & Hypertension  FOLLOW UP NOTE  [phone]--------------------------------------------------------------------------------  Chief Complaint:Acute pancreatitis without infection or necrosis      HPI: 61 y/o male with PMH of CABG, DM, HTN, HLD presenting as transfer from [place] for c/f STEMI. Course c/b gallstone pancreatitis leading to ARDS and shock & cardiac arrest now on [facility] ECMO. Had significant troponin elevation and his LVEF down to 8%. Pt was deemed to be too unstable to have an angiogram and potential PCI due to his co-morbidities & a new subdural bleed. Pt being seen for ERIC. SCr on arrival was 2.15; trended down to hector 1.6 on 11/25 and eventually increased to 3.95 11/28. Pt received IV diuretics as per CTU. Pt initiated on CRRT on 12/5/22 to optimize volume status and electrolytes.  Pt underwent decannulation of ECMO on 12/8/22. Pt was restarted on 12/9/22 for volume overload. Pt underwent mitral clip OR (12/16/22). s/p trach on 12/16       24 hour events/subjective: Patient seen & examined. Labs & vitals reviewed. Remains on TC with FiO2 40% all night. Bp stable, off pressors. Last HD session 1/4 with 2L UF. Uf session yesterday 1.5L removed.  CXR remains with b/l pulm vasc congestion. UO in the last 24 hours 300cc. Net positive 300cc in 24 hrs              PAST HISTORY  --------------------------------------------------------------------------------  No significant changes to PMH, PSH, FHx, SHx, unless otherwise noted    ALLERGIES & MEDICATIONS  --------------------------------------------------------------------------------  Allergies    No Known Allergies    Intolerances      Standing Inpatient Medications  aMIOdarone    Tablet 200 milliGRAM(s) Oral daily  aspirin  chewable 81 milliGRAM(s) Oral daily  atorvastatin 80 milliGRAM(s) Oral at bedtime  caspofungin IVPB 50 milliGRAM(s) IV Intermittent every 24 hours  caspofungin IVPB      chlorhexidine 0.12% Liquid 15 milliLiter(s) Oral Mucosa every 12 hours  chlorhexidine 2% Cloths 1 Application(s) Topical <User Schedule>  dextrose 5%. 1000 milliLiter(s) IV Continuous <Continuous>  dextrose 50% Injectable 50 milliLiter(s) IV Push every 15 minutes  epoetin teena-epbx (RETACRIT) Injectable 5000 Unit(s) IV Push <User Schedule>  glucagon  Injectable 1 milliGRAM(s) IntraMuscular once  heparin   Injectable 5000 Unit(s) SubCutaneous every 8 hours  insulin lispro (ADMELOG) corrective regimen sliding scale   SubCutaneous every 4 hours  insulin NPH human recombinant 7 Unit(s) SubCutaneous every 6 hours  metoclopramide Injectable 5 milliGRAM(s) IV Push every 8 hours  midodrine 20 milliGRAM(s) Oral every 8 hours  multivitamin/minerals/iron Oral Solution (CENTRUM) 15 milliLiter(s) Oral daily  pantoprazole  Injectable 40 milliGRAM(s) IV Push daily  phenylephrine    Infusion 0.287 MICROgram(s)/kG/Min IV Continuous <Continuous>  polyethylene glycol 3350 17 Gram(s) Oral daily  senna 2 Tablet(s) Oral at bedtime  sevelamer carbonate Powder 800 milliGRAM(s) Enteral Tube two times a day  thiamine 100 milliGRAM(s) Enteral Tube daily    PRN Inpatient Medications  acetaminophen    Suspension .. 650 milliGRAM(s) Oral every 6 hours PRN  dextrose Oral Gel 15 Gram(s) Oral once PRN  simethicone 80 milliGRAM(s) Chew daily PRN  sodium chloride 0.9% lock flush 10 milliLiter(s) IV Push every 1 hour PRN      REVIEW OF SYSTEMS  --------------------------------------------------------------------------------  Gen: No chills  Respiratory: No dyspnea, cough  CV: No chest pain  GI: No abdominal pain, diarrhea,  nausea, vomiting  : No dysuria, hematuria  MSK:  + edema  Neuro: No dizziness/lightheadedness      All other systems were reviewed and are negative, except as noted.    VITALS/PHYSICAL EXAM  --------------------------------------------------------------------------------  T(C): 36.7 (01-06-23 @ 08:00), Max: 37.2 (01-05-23 @ 20:00)  HR: 105 (01-06-23 @ 10:00) (90 - 116)  BP: 96/68 (01-05-23 @ 16:30) (96/68 - 96/68)  RR: 11 (01-06-23 @ 10:00) (7 - 42)  SpO2: 97% (01-06-23 @ 10:00) (96% - 100%)  Wt(kg): --        01-05-23 @ 07:01  -  01-06-23 @ 07:00  --------------------------------------------------------  IN: 2150 mL / OUT: 1820 mL / NET: 330 mL    01-06-23 @ 07:01  -  01-06-23 @ 11:21  --------------------------------------------------------  IN: 195 mL / OUT: 25 mL / NET: 170 mL      Physical Exam:  	Gen: NAD  	HEENT: Anicteric  	Pulm: +TC   	CV: S1S2+  	Abd: Soft, +BS      	Ext: trace LE edema B/L  	Neuro: Awake  	Skin: Warm and dry  	Vascular access: RIJ non tunneled catheter      LABS/STUDIES  --------------------------------------------------------------------------------              9.3    13.93 >-----------<  139      [01-06-23 @ 00:38]              30.3     134  |  94  |  68  ----------------------------<  131      [01-06-23 @ 00:38]  5.0   |  25  |  3.80        Ca     8.8     [01-06-23 @ 00:38]      Mg     2.9     [01-06-23 @ 00:38]      Phos  5.4     [01-06-23 @ 00:38]    TPro  7.5  /  Alb  3.5  /  TBili  1.0  /  DBili  x   /  AST  28  /  ALT  16  /  AlkPhos  77  [01-06-23 @ 00:38]          Creatinine Trend:  SCr 3.80 [01-06 @ 00:38]  SCr 2.77 [01-05 @ 00:11]  SCr 3.71 [01-04 @ 00:27]  SCr 3.27 [01-03 @ 08:36]  SCr 2.98 [01-03 @ 00:07]        Iron 39, TIBC 222, %sat 18      [12-31-22 @ 06:41]  Ferritin 346      [12-31-22 @ 06:42]    HBsAg Nonreact      [01-04-23 @ 10:02]     [facility] Division of Kidney Diseases & Hypertension  FOLLOW UP NOTE  [phone]--------------------------------------------------------------------------------  Chief Complaint:Acute pancreatitis without infection or necrosis      HPI: 63 y/o male with PMH of CABG, DM, HTN, HLD presenting as transfer from [place] for c/f STEMI. Course c/b gallstone pancreatitis leading to ARDS and shock & cardiac arrest now on [facility] ECMO. Had significant troponin elevation and his LVEF down to 8%. Pt was deemed to be too unstable to have an angiogram and potential PCI due to his co-morbidities & a new subdural bleed. Pt being seen for ERIC. SCr on arrival was 2.15; trended down to hector 1.6 on 11/25 and eventually increased to 3.95 11/28. Pt received IV diuretics as per CTU. Pt initiated on CRRT on 12/5/22 to optimize volume status and electrolytes.  Pt underwent decannulation of ECMO on 12/8/22. Pt was restarted on 12/9/22 for volume overload. Pt underwent mitral clip OR (12/16/22). s/p trach on 12/16       24 hour events/subjective: Patient seen & examined. Labs & vitals reviewed. Remains on TC with FiO2 40% all night. Bp stable, off pressors. Last HD session 1/4 with 2L UF. Uf session yesterday 1.5L removed.  CXR remains with b/l pulm vasc congestion. UO in the last 24 hours 300cc. Net positive 300cc in 24 hrs              PAST HISTORY  --------------------------------------------------------------------------------  No significant changes to PMH, PSH, FHx, SHx, unless otherwise noted    ALLERGIES & MEDICATIONS  --------------------------------------------------------------------------------  Allergies    No Known Allergies    Intolerances      Standing Inpatient Medications  aMIOdarone    Tablet 200 milliGRAM(s) Oral daily  aspirin  chewable 81 milliGRAM(s) Oral daily  atorvastatin 80 milliGRAM(s) Oral at bedtime  caspofungin IVPB 50 milliGRAM(s) IV Intermittent every 24 hours  caspofungin IVPB      chlorhexidine 0.12% Liquid 15 milliLiter(s) Oral Mucosa every 12 hours  chlorhexidine 2% Cloths 1 Application(s) Topical <User Schedule>  dextrose 5%. 1000 milliLiter(s) IV Continuous <Continuous>  dextrose 50% Injectable 50 milliLiter(s) IV Push every 15 minutes  epoetin teena-epbx (RETACRIT) Injectable 5000 Unit(s) IV Push <User Schedule>  glucagon  Injectable 1 milliGRAM(s) IntraMuscular once  heparin   Injectable 5000 Unit(s) SubCutaneous every 8 hours  insulin lispro (ADMELOG) corrective regimen sliding scale   SubCutaneous every 4 hours  insulin NPH human recombinant 7 Unit(s) SubCutaneous every 6 hours  metoclopramide Injectable 5 milliGRAM(s) IV Push every 8 hours  midodrine 20 milliGRAM(s) Oral every 8 hours  multivitamin/minerals/iron Oral Solution (CENTRUM) 15 milliLiter(s) Oral daily  pantoprazole  Injectable 40 milliGRAM(s) IV Push daily  phenylephrine    Infusion 0.287 MICROgram(s)/kG/Min IV Continuous <Continuous>  polyethylene glycol 3350 17 Gram(s) Oral daily  senna 2 Tablet(s) Oral at bedtime  sevelamer carbonate Powder 800 milliGRAM(s) Enteral Tube two times a day  thiamine 100 milliGRAM(s) Enteral Tube daily    PRN Inpatient Medications  acetaminophen    Suspension .. 650 milliGRAM(s) Oral every 6 hours PRN  dextrose Oral Gel 15 Gram(s) Oral once PRN  simethicone 80 milliGRAM(s) Chew daily PRN  sodium chloride 0.9% lock flush 10 milliLiter(s) IV Push every 1 hour PRN      REVIEW OF SYSTEMS  --------------------------------------------------------------------------------  Gen: No chills  Respiratory: No dyspnea, cough  CV: No chest pain  GI: No abdominal pain, diarrhea,  nausea, vomiting  : No dysuria, hematuria  MSK:  + edema  Neuro: No dizziness/lightheadedness      All other systems were reviewed and are negative, except as noted.    VITALS/PHYSICAL EXAM  --------------------------------------------------------------------------------  T(C): 36.7 (01-06-23 @ 08:00), Max: 37.2 (01-05-23 @ 20:00)  HR: 105 (01-06-23 @ 10:00) (90 - 116)  BP: 96/68 (01-05-23 @ 16:30) (96/68 - 96/68)  RR: 11 (01-06-23 @ 10:00) (7 - 42)  SpO2: 97% (01-06-23 @ 10:00) (96% - 100%)  Wt(kg): --        01-05-23 @ 07:01  -  01-06-23 @ 07:00  --------------------------------------------------------  IN: 2150 mL / OUT: 1820 mL / NET: 330 mL    01-06-23 @ 07:01  -  01-06-23 @ 11:21  --------------------------------------------------------  IN: 195 mL / OUT: 25 mL / NET: 170 mL      Physical Exam:  	Gen: NAD  	HEENT: Anicteric  	Pulm: +TC   	CV: S1S2+  	Abd: Soft, +BS      	Ext: trace LE edema B/L  	Neuro: Awake  	Skin: Warm and dry  	Vascular access: RIJ non tunneled catheter      LABS/STUDIES  --------------------------------------------------------------------------------              9.3    13.93 >-----------<  139      [01-06-23 @ 00:38]              30.3     134  |  94  |  68  ----------------------------<  131      [01-06-23 @ 00:38]  5.0   |  25  |  3.80        Ca     8.8     [01-06-23 @ 00:38]      Mg     2.9     [01-06-23 @ 00:38]      Phos  5.4     [01-06-23 @ 00:38]    TPro  7.5  /  Alb  3.5  /  TBili  1.0  /  DBili  x   /  AST  28  /  ALT  16  /  AlkPhos  77  [01-06-23 @ 00:38]          Creatinine Trend:  SCr 3.80 [01-06 @ 00:38]  SCr 2.77 [01-05 @ 00:11]  SCr 3.71 [01-04 @ 00:27]  SCr 3.27 [01-03 @ 08:36]  SCr 2.98 [01-03 @ 00:07]        Iron 39, TIBC 222, %sat 18      [12-31-22 @ 06:41]  Ferritin 346      [12-31-22 @ 06:42]    HBsAg Nonreact      [01-04-23 @ 10:02]     [facility] Division of Kidney Diseases & Hypertension  FOLLOW UP NOTE  [phone]--------------------------------------------------------------------------------  Chief Complaint:Acute pancreatitis without infection or necrosis      HPI: 61 y/o male with PMH of CABG, DM, HTN, HLD presenting as transfer from [place] for c/f STEMI. Course c/b gallstone pancreatitis leading to ARDS and shock & cardiac arrest now on VA ECMO. Had significant troponin elevation and his LVEF down to 8%. Pt was deemed to be too unstable to have an angiogram and potential PCI due to his co-morbidities & a new subdural bleed. Pt being seen for ERIC. SCr on arrival was 2.15; trended down to hector 1.6 on 11/25 and eventually increased to 3.95 11/28. Pt received IV diuretics as per CTU. Pt initiated on CRRT on 12/5/22 to optimize volume status and electrolytes.  Pt underwent decannulation of ECMO on 12/8/22. Pt was restarted on 12/9/22 for volume overload. Pt underwent mitral clip OR (12/16/22). s/p trach on 12/16       24 hour events/subjective: Patient seen & examined. Labs & vitals reviewed. Remains on TC with FiO2 40% all night. Bp stable, off pressors. Last HD session 1/4 with 2L UF. Uf session yesterday 1.5L removed.  CXR remains with b/l pulm vasc congestion. UO in the last 24 hours 300cc. Net positive 300cc in 24 hrs              PAST HISTORY  --------------------------------------------------------------------------------  No significant changes to PMH, PSH, FHx, SHx, unless otherwise noted    ALLERGIES & MEDICATIONS  --------------------------------------------------------------------------------  Allergies    No Known Allergies    Intolerances      Standing Inpatient Medications  aMIOdarone    Tablet 200 milliGRAM(s) Oral daily  aspirin  chewable 81 milliGRAM(s) Oral daily  atorvastatin 80 milliGRAM(s) Oral at bedtime  caspofungin IVPB 50 milliGRAM(s) IV Intermittent every 24 hours  caspofungin IVPB      chlorhexidine 0.12% Liquid 15 milliLiter(s) Oral Mucosa every 12 hours  chlorhexidine 2% Cloths 1 Application(s) Topical <User Schedule>  dextrose 5%. 1000 milliLiter(s) IV Continuous <Continuous>  dextrose 50% Injectable 50 milliLiter(s) IV Push every 15 minutes  epoetin teena-epbx (RETACRIT) Injectable 5000 Unit(s) IV Push <User Schedule>  glucagon  Injectable 1 milliGRAM(s) IntraMuscular once  heparin   Injectable 5000 Unit(s) SubCutaneous every 8 hours  insulin lispro (ADMELOG) corrective regimen sliding scale   SubCutaneous every 4 hours  insulin NPH human recombinant 7 Unit(s) SubCutaneous every 6 hours  metoclopramide Injectable 5 milliGRAM(s) IV Push every 8 hours  midodrine 20 milliGRAM(s) Oral every 8 hours  multivitamin/minerals/iron Oral Solution (CENTRUM) 15 milliLiter(s) Oral daily  pantoprazole  Injectable 40 milliGRAM(s) IV Push daily  phenylephrine    Infusion 0.287 MICROgram(s)/kG/Min IV Continuous <Continuous>  polyethylene glycol 3350 17 Gram(s) Oral daily  senna 2 Tablet(s) Oral at bedtime  sevelamer carbonate Powder 800 milliGRAM(s) Enteral Tube two times a day  thiamine 100 milliGRAM(s) Enteral Tube daily    PRN Inpatient Medications  acetaminophen    Suspension .. 650 milliGRAM(s) Oral every 6 hours PRN  dextrose Oral Gel 15 Gram(s) Oral once PRN  simethicone 80 milliGRAM(s) Chew daily PRN  sodium chloride 0.9% lock flush 10 milliLiter(s) IV Push every 1 hour PRN      REVIEW OF SYSTEMS  --------------------------------------------------------------------------------  Gen: No chills  Respiratory: No dyspnea, cough  CV: No chest pain  GI: No abdominal pain, diarrhea,  nausea, vomiting  : No dysuria, hematuria  MSK:  + edema  Neuro: No dizziness/lightheadedness      All other systems were reviewed and are negative, except as noted.    VITALS/PHYSICAL EXAM  --------------------------------------------------------------------------------  T(C): 36.7 (01-06-23 @ 08:00), Max: 37.2 (01-05-23 @ 20:00)  HR: 105 (01-06-23 @ 10:00) (90 - 116)  BP: 96/68 (01-05-23 @ 16:30) (96/68 - 96/68)  RR: 11 (01-06-23 @ 10:00) (7 - 42)  SpO2: 97% (01-06-23 @ 10:00) (96% - 100%)  Wt(kg): --        01-05-23 @ 07:01  -  01-06-23 @ 07:00  --------------------------------------------------------  IN: 2150 mL / OUT: 1820 mL / NET: 330 mL    01-06-23 @ 07:01  -  01-06-23 @ 11:21  --------------------------------------------------------  IN: 195 mL / OUT: 25 mL / NET: 170 mL      Physical Exam:  	Gen: NAD  	HEENT: Anicteric  	Pulm: +TC   	CV: S1S2+  	Abd: Soft, +BS      	Ext: trace LE edema B/L  	Neuro: Awake  	Skin: Warm and dry  	Vascular access: RIJ non tunneled catheter      LABS/STUDIES  --------------------------------------------------------------------------------              9.3    13.93 >-----------<  139      [01-06-23 @ 00:38]              30.3     134  |  94  |  68  ----------------------------<  131      [01-06-23 @ 00:38]  5.0   |  25  |  3.80        Ca     8.8     [01-06-23 @ 00:38]      Mg     2.9     [01-06-23 @ 00:38]      Phos  5.4     [01-06-23 @ 00:38]    TPro  7.5  /  Alb  3.5  /  TBili  1.0  /  DBili  x   /  AST  28  /  ALT  16  /  AlkPhos  77  [01-06-23 @ 00:38]          Creatinine Trend:  SCr 3.80 [01-06 @ 00:38]  SCr 2.77 [01-05 @ 00:11]  SCr 3.71 [01-04 @ 00:27]  SCr 3.27 [01-03 @ 08:36]  SCr 2.98 [01-03 @ 00:07]        Iron 39, TIBC 222, %sat 18      [12-31-22 @ 06:41]  Ferritin 346      [12-31-22 @ 06:42]    HBsAg Nonreact      [01-04-23 @ 10:02]

## 2023-01-06 NOTE — PROGRESS NOTE ADULT - ATTENDING COMMENTS
#eric  ERIC/ ATN in the setting of STEMI, cardiac arrest & cardiogenic shock  was nonoliguric but now oliguric  requiring HD for UF/HD for volume removal  plan UF today  HD MWF; hadUF yesterday  plan HD today  plan hd/uf tomorrow  #has neck shiley   #VO/pul edema  pul congestion on xray  UF today with HD  #mild hyperK- hd today  #hypotension-pressors per ct icu  #anemia - on MIGUEL, monitor hb trend  #hyperphos-conr renvela; check phos levels  d/w CT ICU

## 2023-01-06 NOTE — PROGRESS NOTE ADULT - ASSESSMENT
61 YO M with a history of CAD s/p 4v CABG ~2019 in [facility], DM2 (A1c 7.3%), and HTN who initially presented to OSH with abdominal pain and n/v and found to have pancreatitis with lipase > 3000 though also with elevated troponins. CT abdomen revealed acute pancreatitis and his initial LVEF was 40-45%. He developed MSOF with hypoxic respiratory failure requiring intubation and ERIC and went into hypoxic PEA arrest requiring ACLS and due to persistent hypoxia and hypotension on pressors after ROSC was cannulated to VA ECMO (LFV, RFA, no anterograde perfusion catheter) on 11/25. Notably CTH that day revealed small subdural hemorrhage.     His post arrest TTE on 11/26 showed a signficantly worse LVEF of 5-10% with an AV that was only minimally opening. Since then he has had improvement in his LV function to ~35-40% and improved pulsatility while tolerating progressive slow ECMO weans. ECMO turndown (note in chart 11/30) was reassuring for ability to decannulate to IABP/inotropes. LHC 12/06 showed closure of his 3 vein grafts with graft to LAD patent though with distal native disease. No coronary intervention was done. IABP placed, ECMO successfully decannulated 12/08 (transfused 2u pRBCs during).     His ARDS has improved and he's now s/p trach. He's now off IABP, removed 12/17, and inotropes, however has previously been unable to tolerate GDMT due to soft BPs and renal failure requiring dialysis. 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 He's afebrile with improving leukocytosis. He has urine output but not enough to stay euvolemic, about 10-15ml/hour. He's tolerating intermittent HD with midodrine for BP support. Normotensive today with SBP in 120s. He is mildly volume overloaded on exam.    Previous cardiac studies:  TTE 12/19/22 - EF 24%, LVIDd 5.6, normal RV function, estimated pasp 45mmhg  LHC (12/06/22) - patent LIMA-LAD, RCA , LCx , aortogram w/ closure of 3 vein grafts, LVEDP 22 mm Hg, left-to-left and left-to-right collaterals  TTE (12/03/22) - mod-to-sev segmental LVSD (inferolateral, inferior, inferoseptal hypokinesis) 63 YO M with a history of CAD s/p 4v CABG ~2019 in [facility], DM2 (A1c 7.3%), and HTN who initially presented to OSH with abdominal pain and n/v and found to have pancreatitis with lipase > 3000 though also with elevated troponins. CT abdomen revealed acute pancreatitis and his initial LVEF was 40-45%. He developed MSOF with hypoxic respiratory failure requiring intubation and ERIC and went into hypoxic PEA arrest requiring ACLS and due to persistent hypoxia and hypotension on pressors after ROSC was cannulated to VA ECMO (LFV, RFA, no anterograde perfusion catheter) on 11/25. Notably CTH that day revealed small subdural hemorrhage.     His post arrest TTE on 11/26 showed a signficantly worse LVEF of 5-10% with an AV that was only minimally opening. Since then he has had improvement in his LV function to ~35-40% and improved pulsatility while tolerating progressive slow ECMO weans. ECMO turndown (note in chart 11/30) was reassuring for ability to decannulate to IABP/inotropes. LHC 12/06 showed closure of his 3 vein grafts with graft to LAD patent though with distal native disease. No coronary intervention was done. IABP placed, ECMO successfully decannulated 12/08 (transfused 2u pRBCs during).     His ARDS has improved and he's now s/p trach. He's now off IABP, removed 12/17, and inotropes, however has previously been unable to tolerate GDMT due to soft BPs and renal failure requiring dialysis. 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 He's afebrile with improving leukocytosis. He has urine output but not enough to stay euvolemic, about 10-15ml/hour. He's tolerating intermittent HD with midodrine for BP support. Normotensive today with SBP in 120s. He is mildly volume overloaded on exam.    Previous cardiac studies:  TTE 12/19/22 - EF 24%, LVIDd 5.6, normal RV function, estimated pasp 45mmhg  LHC (12/06/22) - patent LIMA-LAD, RCA , LCx , aortogram w/ closure of 3 vein grafts, LVEDP 22 mm Hg, left-to-left and left-to-right collaterals  TTE (12/03/22) - mod-to-sev segmental LVSD (inferolateral, inferior, inferoseptal hypokinesis) 61 YO M with a history of CAD s/p 4v CABG ~2019 in [facility], DM2 (A1c 7.3%), and HTN who initially presented to OSH with abdominal pain and n/v and found to have pancreatitis with lipase > 3000 though also with elevated troponins. CT abdomen revealed acute pancreatitis and his initial LVEF was 40-45%. He developed MSOF with hypoxic respiratory failure requiring intubation and ERIC and went into hypoxic PEA arrest requiring ACLS and due to persistent hypoxia and hypotension on pressors after ROSC was cannulated to VA ECMO (LFV, RFA, no anterograde perfusion catheter) on 11/25. Notably CTH that day revealed small subdural hemorrhage.     His post arrest TTE on 11/26 showed a signficantly worse LVEF of 5-10% with an AV that was only minimally opening. Since then he has had improvement in his LV function to ~35-40% and improved pulsatility while tolerating progressive slow ECMO weans. ECMO turndown (note in chart 11/30) was reassuring for ability to decannulate to IABP/inotropes. LHC 12/06 showed closure of his 3 vein grafts with graft to LAD patent though with distal native disease. No coronary intervention was done. IABP placed, ECMO successfully decannulated 12/08 (transfused 2u pRBCs during).     His ARDS has improved and he's now s/p trach. He's now off IABP, removed 12/17, and inotropes, however has previously been unable to tolerate GDMT due to soft BPs and renal failure requiring dialysis. 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 He's afebrile with improving leukocytosis. He has urine output but not enough to stay euvolemic, about 10-15ml/hour. He's tolerating intermittent HD with midodrine for BP support. Normotensive today with SBP in 120s. He is mildly volume overloaded on exam.    Previous cardiac studies:  TTE 12/19/22 - EF 24%, LVIDd 5.6, normal RV function, estimated pasp 45mmhg  LHC (12/06/22) - patent LIMA-LAD, RCA , LCx , aortogram w/ closure of 3 vein grafts, LVEDP 22 mm Hg, left-to-left and left-to-right collaterals  TTE (12/03/22) - mod-to-sev segmental LVSD (inferolateral, inferior, inferoseptal hypokinesis)

## 2023-01-06 NOTE — PROGRESS NOTE ADULT - PROBLEM SELECTOR PLAN 1
- ischemic with most recent TTE 12/29 showing EF 22%  - unable to tolerate GDMT due to borderline BPs requiring midodrine, although now with improvement in BP  - recommend decreasing midodrine to 10mg TID, plan to gradually wean as tolerated for SBP > 100  - please send AM cortisol level  - volume management with daily HD/UF

## 2023-01-07 LAB
ALBUMIN SERPL ELPH-MCNC: 3.6 G/DL — SIGNIFICANT CHANGE UP (ref 3.3–5)
ALP SERPL-CCNC: 84 U/L — SIGNIFICANT CHANGE UP (ref 40–120)
ALT FLD-CCNC: 14 U/L — SIGNIFICANT CHANGE UP (ref 10–45)
ANION GAP SERPL CALC-SCNC: 14 MMOL/L — SIGNIFICANT CHANGE UP (ref 5–17)
AST SERPL-CCNC: 26 U/L — SIGNIFICANT CHANGE UP (ref 10–40)
BILIRUB SERPL-MCNC: 1.3 MG/DL — HIGH (ref 0.2–1.2)
BUN SERPL-MCNC: 45 MG/DL — HIGH (ref 7–23)
CALCIUM SERPL-MCNC: 9.6 MG/DL — SIGNIFICANT CHANGE UP (ref 8.4–10.5)
CHLORIDE SERPL-SCNC: 97 MMOL/L — SIGNIFICANT CHANGE UP (ref 96–108)
CO2 SERPL-SCNC: 26 MMOL/L — SIGNIFICANT CHANGE UP (ref 22–31)
CREAT SERPL-MCNC: 2.71 MG/DL — HIGH (ref 0.5–1.3)
CULTURE RESULTS: SIGNIFICANT CHANGE UP
EGFR: 26 ML/MIN/1.73M2 — LOW
GAS PNL BLDA: SIGNIFICANT CHANGE UP
GLUCOSE BLDC GLUCOMTR-MCNC: 146 MG/DL — HIGH (ref 70–99)
GLUCOSE BLDC GLUCOMTR-MCNC: 154 MG/DL — HIGH (ref 70–99)
GLUCOSE BLDC GLUCOMTR-MCNC: 161 MG/DL — HIGH (ref 70–99)
GLUCOSE BLDC GLUCOMTR-MCNC: 169 MG/DL — HIGH (ref 70–99)
GLUCOSE BLDC GLUCOMTR-MCNC: 186 MG/DL — HIGH (ref 70–99)
GLUCOSE SERPL-MCNC: 156 MG/DL — HIGH (ref 70–99)
HCT VFR BLD CALC: 29.3 % — LOW (ref 39–50)
HGB BLD-MCNC: 9.1 G/DL — LOW (ref 13–17)
MAGNESIUM SERPL-MCNC: 2.6 MG/DL — SIGNIFICANT CHANGE UP (ref 1.6–2.6)
MCHC RBC-ENTMCNC: 29.7 PG — SIGNIFICANT CHANGE UP (ref 27–34)
MCHC RBC-ENTMCNC: 31.1 GM/DL — LOW (ref 32–36)
MCV RBC AUTO: 95.8 FL — SIGNIFICANT CHANGE UP (ref 80–100)
NRBC # BLD: 0 /100 WBCS — SIGNIFICANT CHANGE UP (ref 0–0)
PHOSPHATE SERPL-MCNC: 3.4 MG/DL — SIGNIFICANT CHANGE UP (ref 2.5–4.5)
PLATELET # BLD AUTO: 138 K/UL — LOW (ref 150–400)
POTASSIUM SERPL-MCNC: 4.3 MMOL/L — SIGNIFICANT CHANGE UP (ref 3.5–5.3)
POTASSIUM SERPL-SCNC: 4.3 MMOL/L — SIGNIFICANT CHANGE UP (ref 3.5–5.3)
PROT SERPL-MCNC: 7.7 G/DL — SIGNIFICANT CHANGE UP (ref 6–8.3)
RBC # BLD: 3.06 M/UL — LOW (ref 4.2–5.8)
RBC # FLD: 18.5 % — HIGH (ref 10.3–14.5)
SODIUM SERPL-SCNC: 137 MMOL/L — SIGNIFICANT CHANGE UP (ref 135–145)
SPECIMEN SOURCE: SIGNIFICANT CHANGE UP
WBC # BLD: 13.63 K/UL — HIGH (ref 3.8–10.5)
WBC # FLD AUTO: 13.63 K/UL — HIGH (ref 3.8–10.5)

## 2023-01-07 PROCEDURE — 99232 SBSQ HOSP IP/OBS MODERATE 35: CPT | Mod: GC

## 2023-01-07 PROCEDURE — 99291 CRITICAL CARE FIRST HOUR: CPT

## 2023-01-07 PROCEDURE — 71045 X-RAY EXAM CHEST 1 VIEW: CPT | Mod: 26

## 2023-01-07 RX ORDER — METOPROLOL TARTRATE 50 MG
12.5 TABLET ORAL EVERY 12 HOURS
Refills: 0 | Status: DISCONTINUED | OUTPATIENT
Start: 2023-01-07 | End: 2023-01-12

## 2023-01-07 RX ORDER — INSULIN LISPRO 100/ML
VIAL (ML) SUBCUTANEOUS EVERY 6 HOURS
Refills: 0 | Status: DISCONTINUED | OUTPATIENT
Start: 2023-01-07 | End: 2023-01-14

## 2023-01-07 RX ADMIN — MIDODRINE HYDROCHLORIDE 10 MILLIGRAM(S): 2.5 TABLET ORAL at 05:10

## 2023-01-07 RX ADMIN — SEVELAMER CARBONATE 800 MILLIGRAM(S): 2400 POWDER, FOR SUSPENSION ORAL at 17:31

## 2023-01-07 RX ADMIN — CASPOFUNGIN ACETATE 260 MILLIGRAM(S): 7 INJECTION, POWDER, LYOPHILIZED, FOR SOLUTION INTRAVENOUS at 17:43

## 2023-01-07 RX ADMIN — Medication 5 MILLIGRAM(S): at 21:05

## 2023-01-07 RX ADMIN — ATORVASTATIN CALCIUM 80 MILLIGRAM(S): 80 TABLET, FILM COATED ORAL at 21:06

## 2023-01-07 RX ADMIN — AMIODARONE HYDROCHLORIDE 200 MILLIGRAM(S): 400 TABLET ORAL at 05:11

## 2023-01-07 RX ADMIN — SENNA PLUS 2 TABLET(S): 8.6 TABLET ORAL at 21:06

## 2023-01-07 RX ADMIN — HEPARIN SODIUM 5000 UNIT(S): 5000 INJECTION INTRAVENOUS; SUBCUTANEOUS at 08:01

## 2023-01-07 RX ADMIN — HUMAN INSULIN 7 UNIT(S): 100 INJECTION, SUSPENSION SUBCUTANEOUS at 05:47

## 2023-01-07 RX ADMIN — HEPARIN SODIUM 5000 UNIT(S): 5000 INJECTION INTRAVENOUS; SUBCUTANEOUS at 00:21

## 2023-01-07 RX ADMIN — CHLORHEXIDINE GLUCONATE 1 APPLICATION(S): 213 SOLUTION TOPICAL at 06:24

## 2023-01-07 RX ADMIN — SEVELAMER CARBONATE 800 MILLIGRAM(S): 2400 POWDER, FOR SUSPENSION ORAL at 15:19

## 2023-01-07 RX ADMIN — Medication 3: at 01:51

## 2023-01-07 RX ADMIN — HUMAN INSULIN 7 UNIT(S): 100 INJECTION, SUSPENSION SUBCUTANEOUS at 00:22

## 2023-01-07 RX ADMIN — SEVELAMER CARBONATE 800 MILLIGRAM(S): 2400 POWDER, FOR SUSPENSION ORAL at 08:01

## 2023-01-07 RX ADMIN — PANTOPRAZOLE SODIUM 40 MILLIGRAM(S): 20 TABLET, DELAYED RELEASE ORAL at 11:05

## 2023-01-07 RX ADMIN — Medication 81 MILLIGRAM(S): at 11:04

## 2023-01-07 RX ADMIN — Medication 5 MILLIGRAM(S): at 15:19

## 2023-01-07 RX ADMIN — Medication 5 MILLIGRAM(S): at 05:10

## 2023-01-07 RX ADMIN — Medication 100 MILLIGRAM(S): at 11:05

## 2023-01-07 RX ADMIN — Medication 12.5 MILLIGRAM(S): at 15:23

## 2023-01-07 RX ADMIN — Medication 3: at 17:35

## 2023-01-07 RX ADMIN — POLYETHYLENE GLYCOL 3350 17 GRAM(S): 17 POWDER, FOR SOLUTION ORAL at 11:05

## 2023-01-07 RX ADMIN — Medication 3: at 09:57

## 2023-01-07 RX ADMIN — CHLORHEXIDINE GLUCONATE 15 MILLILITER(S): 213 SOLUTION TOPICAL at 05:09

## 2023-01-07 RX ADMIN — HEPARIN SODIUM 5000 UNIT(S): 5000 INJECTION INTRAVENOUS; SUBCUTANEOUS at 17:31

## 2023-01-07 RX ADMIN — Medication 15 MILLILITER(S): at 11:04

## 2023-01-07 RX ADMIN — HUMAN INSULIN 7 UNIT(S): 100 INJECTION, SUSPENSION SUBCUTANEOUS at 11:14

## 2023-01-07 RX ADMIN — MIDODRINE HYDROCHLORIDE 10 MILLIGRAM(S): 2.5 TABLET ORAL at 18:06

## 2023-01-07 RX ADMIN — HUMAN INSULIN 7 UNIT(S): 100 INJECTION, SUSPENSION SUBCUTANEOUS at 17:34

## 2023-01-07 NOTE — PROGRESS NOTE ADULT - ATTENDING COMMENTS
#eric  ERIC/ ATN in the setting of STEMI, cardiac arrest & cardiogenic shock  was nonoliguric but now oliguric  requiring HD for UF/HD for volume removal  HD MWF; hadUF yesterday 1.5kg UF limited by hypotension  plan HD today with UF  #has neck shiley   #VO/pul edema  pul congestion on xray  UF today with HD  #mild hyperK-stable k today;     #hypotension-pressors per ct icu  #anemia - on MIGUEL, monitor hb trend  #hyperphos-conr renvela; check phos levels  d/w CT ICU

## 2023-01-07 NOTE — PROGRESS NOTE ADULT - SUBJECTIVE AND OBJECTIVE BOX
HPI:  Patient is a 61 y/o male with PMH of CABG, DM, HTN, HLD presenting as transfer from [place] for c/f STEMI. PTA arrival received 325 aspirin, 600 plavix, and no heparin drip started. Around 1:30 am patient had multiple episodes of non-bloody diarrhea and emesis with associated abdominal pain and chest pain, unable to localize, non-radiating, with associated SOB and no associated palpitations or diaphoresis. No recent fevers/chills, cough, rashes, or changes in urination. Does report mild diffuse back pain; started 5 days ago in setting on injury while walking and lifting objects. Denies focal weakness, numbness/tingling, or LOC due does report mild dizziness.    Patient seen and examined in ED. Hemodynamically stable, alert and awake, A&Ox3. Daughter at bedside. Reports abdominal pain, and nausea. Abdomen is soft, tender in epigastric area and RUQ. Denies chest pain, SOB. WBC 20, T.bili 3.4, Lipase 300. RUQ US demonstrated gallbladder stones, pericholecystic fluid.  (24 Nov 2022 15:10)      Patient seen and examined at the bedside.    Remained critically ill on continuous ICU monitoring.    OBJECTIVE:  Vital Signs Last 24 Hrs  T(C): 36.6 (07 Jan 2023 08:00), Max: 36.8 (06 Jan 2023 20:45)  T(F): 97.9 (07 Jan 2023 08:00), Max: 98.2 (06 Jan 2023 20:45)  HR: 111 (07 Jan 2023 07:00) (104 - 119)  BP: --  BP(mean): --  RR: 23 (07 Jan 2023 07:00) (11 - 40)  SpO2: 97% (07 Jan 2023 07:00) (79% - 100%)    Parameters below as of 07 Jan 2023 08:00  Patient On (Oxygen Delivery Method): tracheostomy collar    O2 Concentration (%): 40    Physical Exam:   General: Intubated, multiple lines gtt  Neurology: mildly sedated, but able to follow commands   Eyes: bilateral pupils equal and reactive   ENT/Neck: +ETT midline, Neck supple, trachea midline, No JVD   Respiratory: rhonchi bilaterally   CV: S1S2, no murmurs        [x] Sinus rhythm  Abdominal: Soft, NT, ND +BS   Extremities: 1+ pedal edema noted, + peripheral pulses   Skin: No Rashes, Hematoma, Ecchymosis                     Assessment:  61 y/o male with PMH of CABG, DM, HTN, HLD presenting as transfer from [place] for c/f STEMI. PTA arrival received 325 aspirin, 600 plavix, and no heparin drip started. Around 1:30 am patient had multiple episodes of non-bloody diarrhea and emesis with associated abdominal pain and chest pain, unable to localize, non-radiating, with associated SOB and no associated palpitations or diaphoresis. No recent fevers/chills, cough, rashes, or changes in urination. Does report mild diffuse back pain; started 5 days ago in setting on injury while walking and lifting objects. Denies focal weakness, numbness/tingling, or LOC due does report mild dizziness.    Cardiac arrest [facility] 11/25  Hemodynamic instability  Hypovolemia  Post op respiratory insufficiency  Thrombocytopenia  Acute blood loss anemia  Leukocytosis     Plan:   ***Neuro***  Addressed analgesic regimen to optimize function and off sedation for ventilatory synchrony and agitation. CT head showed 4mm subdural hematoma 11/26: repeat CT head unchanged.    ***Cardiovascular***  Patient transferred to [facility] after being [facility] on [facility] for PEA arrest, ECMO dc'ed 12/8. Unclear initial etiology as patient has both coronary disease with severe left ventricular systolic failure as well as evidence of pancreatitis. Patient was requiring an IV Donny infusion for vasogenic shock, which is now off. Invasive hemodynamic monitoring with a central venous catheter & an A-line were required for the continuous central venous and MAP/BP monitoring to ensure adequate cardiovascular support. ASA continued for graft occlusion-thromboembolism prophylaxis Lipitor was also continued for long term graft patency. Patient continues on PO Midodrine while weaning off IV pressors. Patient is also on PO Amiodarone for rate control.     ***Pulmonary***  Mode: vent off  FiO2: 40               ***Heme***  Anemia due to acute blood loss, no current plan for transfusion. Continue to monitor hemoglobin and hematocrit levels     ***GI***  Patient is tolerating Glucerna tube feeds at 65 cc/hr. Protonix for stress ulcer prophylaxis. Bowel regimen with Miralax and senna. Reglan for gut motility.     ***Renal***  Patient has renal failure and Hyperkalemia - received HD and UF yesterday. Optimize renal perfusion with adequate volume resuscitation and continued monitoring of urine output, fluid balance, electrolytes, and BUN/Creatinine.      ***ID***  Afebrile, white blood count within normal limits. Continue trending white blood count and monitoring fever curve. On Caspofungin for Candidemia         ***Endocrine***  Metabolic stability, history of diabetes mellitus required review and adjustment of regular Insulin sliding scale and NPH and glycemic regimen while following serial glucose levels to help achieve and maintain euglycemia       Patient requires continuous monitoring with bedside rhythm monitoring, pulse oximetry monitoring, and continuous central venous and arterial pressure monitoring; and intermittent blood gas analysis. Care plan discussed with the ICU care team.   Patient remained critical, at risk for life threatening decompensation.    I have spent 30 minutes providing critical care management to this patient.    By signing my name below, I, Maria D'Amico, attest that this documentation has been prepared under the direction and in the presence of Dinorah Rubio MD   Electronically signed: Maria D'Amico, Scribe, 01-07-23 @ 08:05    IDinorah, personally performed the services described in this documentation. all medical record entries made by the scribe were at my direction and in my presence. I have reviewed the chart and agree that the record reflects my personal performance and is accurate and complete  Electronically signed: Dinorah Rubio MD  HPI:  Patient is a 63 y/o male with PMH of CABG, DM, HTN, HLD presenting as transfer from [place] for c/f STEMI. PTA arrival received 325 aspirin, 600 plavix, and no heparin drip started. Around 1:30 am patient had multiple episodes of non-bloody diarrhea and emesis with associated abdominal pain and chest pain, unable to localize, non-radiating, with associated SOB and no associated palpitations or diaphoresis. No recent fevers/chills, cough, rashes, or changes in urination. Does report mild diffuse back pain; started 5 days ago in setting on injury while walking and lifting objects. Denies focal weakness, numbness/tingling, or LOC due does report mild dizziness.    Patient seen and examined in ED. Hemodynamically stable, alert and awake, A&Ox3. Daughter at bedside. Reports abdominal pain, and nausea. Abdomen is soft, tender in epigastric area and RUQ. Denies chest pain, SOB. WBC 20, T.bili 3.4, Lipase 300. RUQ US demonstrated gallbladder stones, pericholecystic fluid.  (24 Nov 2022 15:10)      Patient seen and examined at the bedside.    Remained critically ill on continuous ICU monitoring.    OBJECTIVE:  Vital Signs Last 24 Hrs  T(C): 36.6 (07 Jan 2023 08:00), Max: 36.8 (06 Jan 2023 20:45)  T(F): 97.9 (07 Jan 2023 08:00), Max: 98.2 (06 Jan 2023 20:45)  HR: 111 (07 Jan 2023 07:00) (104 - 119)  BP: --  BP(mean): --  RR: 23 (07 Jan 2023 07:00) (11 - 40)  SpO2: 97% (07 Jan 2023 07:00) (79% - 100%)    Parameters below as of 07 Jan 2023 08:00  Patient On (Oxygen Delivery Method): tracheostomy collar    O2 Concentration (%): 40    Physical Exam:   General: Intubated, multiple lines gtt  Neurology: mildly sedated, but able to follow commands   Eyes: bilateral pupils equal and reactive   ENT/Neck: +ETT midline, Neck supple, trachea midline, No JVD   Respiratory: rhonchi bilaterally   CV: S1S2, no murmurs        [x] Sinus rhythm  Abdominal: Soft, NT, ND +BS   Extremities: 1+ pedal edema noted, + peripheral pulses   Skin: No Rashes, Hematoma, Ecchymosis                     Assessment:  63 y/o male with PMH of CABG, DM, HTN, HLD presenting as transfer from [place] for c/f STEMI. PTA arrival received 325 aspirin, 600 plavix, and no heparin drip started. Around 1:30 am patient had multiple episodes of non-bloody diarrhea and emesis with associated abdominal pain and chest pain, unable to localize, non-radiating, with associated SOB and no associated palpitations or diaphoresis. No recent fevers/chills, cough, rashes, or changes in urination. Does report mild diffuse back pain; started 5 days ago in setting on injury while walking and lifting objects. Denies focal weakness, numbness/tingling, or LOC due does report mild dizziness.    Cardiac arrest s/p [facility] ECMO 11/25  Hemodynamic instability  Hypovolemia  Post op respiratory insufficiency  Thrombocytopenia  Acute blood loss anemia  Leukocytosis     Plan:   ***Neuro***  Addressed analgesic regimen to optimize function and off sedation for ventilatory synchrony and agitation. CT head showed 4mm subdural hematoma 11/26: repeat CT head unchanged.    ***Cardiovascular***  Patient transferred to [facility] 11/25 after being placed on [facility] ECM[facility] for PEA arrest, ECMO dc'ed 12/8. Unclear initial etiology as patient has both coronary disease with severe left ventricular systolic failure as well as evidence of pancreatitis. Patient was requiring an IV Donny infusion for vasogenic shock, which is now off. Invasive hemodynamic monitoring with a central venous catheter & an A-line were required for the continuous central venous and MAP/BP monitoring to ensure adequate cardiovascular support. ASA continued for graft occlusion-thromboembolism prophylaxis Lipitor was also continued for long term graft patency. Patient continues on PO Midodrine while weaning off IV pressors. Patient is also on PO Amiodarone for rate control.     ***Pulmonary***  Mode: vent off  FiO2: 40               ***Heme***  Anemia due to acute blood loss, no current plan for transfusion. Continue to monitor hemoglobin and hematocrit levels     ***GI***  Patient is tolerating Glucerna tube feeds at 65 cc/hr. Protonix for stress ulcer prophylaxis. Bowel regimen with Miralax and senna. Reglan for gut motility.     ***Renal***  Patient has renal failure and Hyperkalemia - received HD and UF yesterday. Optimize renal perfusion with adequate volume resuscitation and continued monitoring of urine output, fluid balance, electrolytes, and BUN/Creatinine.      ***ID***  Afebrile, white blood count within normal limits. Continue trending white blood count and monitoring fever curve. On Caspofungin for Candidemia         ***Endocrine***  Metabolic stability, history of diabetes mellitus required review and adjustment of regular Insulin sliding scale and NPH and glycemic regimen while following serial glucose levels to help achieve and maintain euglycemia       Patient requires continuous monitoring with bedside rhythm monitoring, pulse oximetry monitoring, and continuous central venous and arterial pressure monitoring; and intermittent blood gas analysis. Care plan discussed with the ICU care team.   Patient remained critical, at risk for life threatening decompensation.    I have spent 30 minutes providing critical care management to this patient.    By signing my name below, I, Maria D'Amico, attest that this documentation has been prepared under the direction and in the presence of Dinorah Rubio MD   Electronically signed: Maria D'Amico, Scribe, 01-07-23 @ 08:05    IDinorah, personally performed the services described in this documentation. all medical record entries made by the scribe were at my direction and in my presence. I have reviewed the chart and agree that the record reflects my personal performance and is accurate and complete  Electronically signed: Dinorah Rubio MD  HPI:  Patient is a 61 y/o male with PMH of CABG, DM, HTN, HLD presenting as transfer from [place] for c/f STEMI. PTA arrival received 325 aspirin, 600 plavix, and no heparin drip started. Around 1:30 am patient had multiple episodes of non-bloody diarrhea and emesis with associated abdominal pain and chest pain, unable to localize, non-radiating, with associated SOB and no associated palpitations or diaphoresis. No recent fevers/chills, cough, rashes, or changes in urination. Does report mild diffuse back pain; started 5 days ago in setting on injury while walking and lifting objects. Denies focal weakness, numbness/tingling, or LOC due does report mild dizziness.    Patient seen and examined in ED. Hemodynamically stable, alert and awake, A&Ox3. Daughter at bedside. Reports abdominal pain, and nausea. Abdomen is soft, tender in epigastric area and RUQ. Denies chest pain, SOB. WBC 20, T.bili 3.4, Lipase 300. RUQ US demonstrated gallbladder stones, pericholecystic fluid.  (24 Nov 2022 15:10)      Patient seen and examined at the bedside.    Remained critically ill on continuous ICU monitoring.    OBJECTIVE:  Vital Signs Last 24 Hrs  T(C): 36.6 (07 Jan 2023 08:00), Max: 36.8 (06 Jan 2023 20:45)  T(F): 97.9 (07 Jan 2023 08:00), Max: 98.2 (06 Jan 2023 20:45)  HR: 111 (07 Jan 2023 07:00) (104 - 119)  BP: --  BP(mean): --  RR: 23 (07 Jan 2023 07:00) (11 - 40)  SpO2: 97% (07 Jan 2023 07:00) (79% - 100%)    Parameters below as of 07 Jan 2023 08:00  Patient On (Oxygen Delivery Method): tracheostomy collar    O2 Concentration (%): 40    Physical Exam:   General: Intubated, multiple lines gtt  Neurology: mildly sedated, but able to follow commands   Eyes: bilateral pupils equal and reactive   ENT/Neck: +ETT midline, Neck supple, trachea midline, No JVD   Respiratory: rhonchi bilaterally   CV: S1S2, no murmurs        [x] Sinus rhythm  Abdominal: Soft, NT, ND +BS   Extremities: 1+ pedal edema noted, + peripheral pulses   Skin: No Rashes, Hematoma, Ecchymosis                     Assessment:  61 y/o male with PMH of CABG, DM, HTN, HLD presenting as transfer from [place] for c/f STEMI. PTA arrival received 325 aspirin, 600 plavix, and no heparin drip started. Around 1:30 am patient had multiple episodes of non-bloody diarrhea and emesis with associated abdominal pain and chest pain, unable to localize, non-radiating, with associated SOB and no associated palpitations or diaphoresis. No recent fevers/chills, cough, rashes, or changes in urination. Does report mild diffuse back pain; started 5 days ago in setting on injury while walking and lifting objects. Denies focal weakness, numbness/tingling, or LOC due does report mild dizziness.    Cardiac arrest s/p [facility] ECMO 11/25  Hemodynamic instability  Hypovolemia  Post op respiratory insufficiency  Thrombocytopenia  Acute blood loss anemia  Leukocytosis     Plan:   ***Neuro***  Addressed analgesic regimen to optimize function and off sedation for ventilatory synchrony and agitation. CT head showed 4mm subdural hematoma 11/26: repeat CT head unchanged.    ***Cardiovascular***  Patient transferred to [facility] 11/25 after being placed on [facility] ECMO for PEA arrest, ECMO dc'ed 12/8. Unclear initial etiology as patient has both coronary disease with severe left ventricular systolic failure as well as evidence of pancreatitis. Patient was requiring an IV Donny infusion for vasogenic shock, which is now off. Invasive hemodynamic monitoring with a central venous catheter & an A-line were required for the continuous central venous and MAP/BP monitoring to ensure adequate cardiovascular support. ASA continued for graft occlusion-thromboembolism prophylaxis Lipitor was also continued for long term graft patency. Patient continues on PO Midodrine while weaning off IV pressors. Patient is also on PO Amiodarone for rate control.     ***Pulmonary***  Mode: vent off  FiO2: 40               ***Heme***  Anemia due to acute blood loss, no current plan for transfusion. Continue to monitor hemoglobin and hematocrit levels     ***GI***  Patient is tolerating Glucerna tube feeds at 65 cc/hr. Protonix for stress ulcer prophylaxis. Bowel regimen with Miralax and senna. Reglan for gut motility.     ***Renal***  Patient has renal failure and Hyperkalemia - received HD and UF yesterday. Optimize renal perfusion with adequate volume resuscitation and continued monitoring of urine output, fluid balance, electrolytes, and BUN/Creatinine.      ***ID***  Afebrile, white blood count within normal limits. Continue trending white blood count and monitoring fever curve. On Caspofungin for Candidemia         ***Endocrine***  Metabolic stability, history of diabetes mellitus required review and adjustment of regular Insulin sliding scale and NPH and glycemic regimen while following serial glucose levels to help achieve and maintain euglycemia       Patient requires continuous monitoring with bedside rhythm monitoring, pulse oximetry monitoring, and continuous central venous and arterial pressure monitoring; and intermittent blood gas analysis. Care plan discussed with the ICU care team.   Patient remained critical, at risk for life threatening decompensation.    I have spent 30 minutes providing critical care management to this patient.    By signing my name below, I, Maria D'Amico, attest that this documentation has been prepared under the direction and in the presence of Dinorah Rubio MD   Electronically signed: Maria D'Amico, Scribe, 01-07-23 @ 08:05    IDinorah, personally performed the services described in this documentation. all medical record entries made by the scribe were at my direction and in my presence. I have reviewed the chart and agree that the record reflects my personal performance and is accurate and complete  Electronically signed: Dinorah Rubio MD  HPI:  Patient is a 63 y/o male with PMH of CABG, DM, HTN, HLD presenting as transfer from [place] for c/f STEMI. PTA arrival received 325 aspirin, 600 plavix, and no heparin drip started. Around 1:30 am patient had multiple episodes of non-bloody diarrhea and emesis with associated abdominal pain and chest pain, unable to localize, non-radiating, with associated SOB and no associated palpitations or diaphoresis. No recent fevers/chills, cough, rashes, or changes in urination. Does report mild diffuse back pain; started 5 days ago in setting on injury while walking and lifting objects. Denies focal weakness, numbness/tingling, or LOC due does report mild dizziness.    Patient seen and examined in ED. Hemodynamically stable, alert and awake, A&Ox3. Daughter at bedside. Reports abdominal pain, and nausea. Abdomen is soft, tender in epigastric area and RUQ. Denies chest pain, SOB. WBC 20, T.bili 3.4, Lipase 300. RUQ US demonstrated gallbladder stones, pericholecystic fluid.  (24 Nov 2022 15:10)      Patient seen and examined at the bedside.    Remained critically ill on continuous ICU monitoring.    OBJECTIVE:  Vital Signs Last 24 Hrs  T(C): 36.6 (07 Jan 2023 08:00), Max: 36.8 (06 Jan 2023 20:45)  T(F): 97.9 (07 Jan 2023 08:00), Max: 98.2 (06 Jan 2023 20:45)  HR: 111 (07 Jan 2023 07:00) (104 - 119)  BP: --  BP(mean): --  RR: 23 (07 Jan 2023 07:00) (11 - 40)  SpO2: 97% (07 Jan 2023 07:00) (79% - 100%)    Parameters below as of 07 Jan 2023 08:00  Patient On (Oxygen Delivery Method): tracheostomy collar    O2 Concentration (%): 40    Physical Exam:   General: Intubated, multiple lines gtt  Neurology: mildly sedated, but able to follow commands   Eyes: bilateral pupils equal and reactive   ENT/Neck: +ETT midline, Neck supple, trachea midline, No JVD   Respiratory: rhonchi bilaterally   CV: S1S2, no murmurs        [x] Sinus rhythm  Abdominal: Soft, NT, ND +BS   Extremities: 1+ pedal edema noted, + peripheral pulses   Skin: No Rashes, Hematoma, Ecchymosis                     Assessment:  63 y/o male with PMH of CABG, DM, HTN, HLD presenting as transfer from [place] for c/f STEMI. PTA arrival received 325 aspirin, 600 plavix, and no heparin drip started. Around 1:30 am patient had multiple episodes of non-bloody diarrhea and emesis with associated abdominal pain and chest pain, unable to localize, non-radiating, with associated SOB and no associated palpitations or diaphoresis. No recent fevers/chills, cough, rashes, or changes in urination. Does report mild diffuse back pain; started 5 days ago in setting on injury while walking and lifting objects. Denies focal weakness, numbness/tingling, or LOC due does report mild dizziness.    Cardiac arrest s/p [facility] ECMO 11/25  Hemodynamic instability  Hypovolemia  Post op respiratory insufficiency  Thrombocytopenia  Acute blood loss anemia  Leukocytosis     Plan:   ***Neuro***  Addressed analgesic regimen to optimize function and off sedation for ventilatory synchrony and agitation. CT head showed 4mm subdural hematoma 11/26: repeat CT head unchanged.    ***Cardiovascular***  Patient transferred to [facility] 11/25 after being placed on [facility] ECMO for PEA arrest, ECMO dc'ed 12/8. Unclear initial etiology as patient has both coronary disease with severe left ventricular systolic failure as well as evidence of pancreatitis. Patient was requiring an IV Donny infusion for vasogenic shock, which is now off. Invasive hemodynamic monitoring with a central venous catheter & an A-line were required for the continuous central venous and MAP/BP monitoring to ensure adequate cardiovascular support. ASA continued for graft occlusion-thromboembolism prophylaxis Lipitor was also continued for long term graft patency. Patient continues on PO Midodrine while weaning off IV pressors. Patient is also on PO Amiodarone for rate control.     ***Pulmonary***  Mode: vent off  FiO2: 40               ***Heme***  Anemia due to acute blood loss, no current plan for transfusion. Continue to monitor hemoglobin and hematocrit levels     ***GI***  Patient is tolerating Glucerna tube feeds at 65 cc/hr. Protonix for stress ulcer prophylaxis. Bowel regimen with Miralax and senna. Reglan for gut motility.     ***Renal***  Patient has renal failure and Hyperkalemia - received HD and UF yesterday. Plan for HD again today. Optimize renal perfusion with adequate volume resuscitation and continued monitoring of urine output, fluid balance, electrolytes, and BUN/Creatinine.      ***ID***  Afebrile, white blood count within normal limits. Continue trending white blood count and monitoring fever curve. On Caspofungin for Candidemia         ***Endocrine***  Metabolic stability, history of diabetes mellitus required review and adjustment of regular Insulin sliding scale and NPH and glycemic regimen while following serial glucose levels to help achieve and maintain euglycemia       Patient requires continuous monitoring with bedside rhythm monitoring, pulse oximetry monitoring, and continuous central venous and arterial pressure monitoring; and intermittent blood gas analysis. Care plan discussed with the ICU care team.   Patient remained critical, at risk for life threatening decompensation.    I have spent 30 minutes providing critical care management to this patient.    By signing my name below, I, Maria D'Amico, attest that this documentation has been prepared under the direction and in the presence of Dinorah Rubio MD   Electronically signed: Maria D'Amico, Scribe, 01-07-23 @ 08:05    I, Dinorah Rubio, personally performed the services described in this documentation. all medical record entries made by the karlaibkyle were at my direction and in my presence. I have reviewed the chart and agree that the record reflects my personal performance and is accurate and complete  Electronically signed: Dinorah Rubio MD  HPI:  Patient is a 61 y/o male with PMH of CABG, DM, HTN, HLD presenting as transfer from [place] for c/f STEMI. PTA arrival received 325 aspirin, 600 plavix, and no heparin drip started. Around 1:30 am patient had multiple episodes of non-bloody diarrhea and emesis with associated abdominal pain and chest pain, unable to localize, non-radiating, with associated SOB and no associated palpitations or diaphoresis. No recent fevers/chills, cough, rashes, or changes in urination. Does report mild diffuse back pain; started 5 days ago in setting on injury while walking and lifting objects. Denies focal weakness, numbness/tingling, or LOC due does report mild dizziness.    Patient seen and examined in ED. Hemodynamically stable, alert and awake, A&Ox3. Daughter at bedside. Reports abdominal pain, and nausea. Abdomen is soft, tender in epigastric area and RUQ. Denies chest pain, SOB. WBC 20, T.bili 3.4, Lipase 300. RUQ US demonstrated gallbladder stones, pericholecystic fluid.  (24 Nov 2022 15:10)      Patient seen and examined at the bedside.    Remained critically ill on continuous ICU monitoring.    OBJECTIVE:  Vital Signs Last 24 Hrs  T(C): 36.6 (07 Jan 2023 08:00), Max: 36.8 (06 Jan 2023 20:45)  T(F): 97.9 (07 Jan 2023 08:00), Max: 98.2 (06 Jan 2023 20:45)  HR: 111 (07 Jan 2023 07:00) (104 - 119)  BP: --  BP(mean): --  RR: 23 (07 Jan 2023 07:00) (11 - 40)  SpO2: 97% (07 Jan 2023 07:00) (79% - 100%)    Parameters below as of 07 Jan 2023 08:00  Patient On (Oxygen Delivery Method): tracheostomy collar    O2 Concentration (%): 40    Physical Exam:   General: Intubated, multiple lines gtt  Neurology: mildly sedated, but able to follow commands   Eyes: bilateral pupils equal and reactive   ENT/Neck: +ETT midline, Neck supple, trachea midline, No JVD   Respiratory: rhonchi bilaterally   CV: S1S2, no murmurs        [x] Sinus rhythm  Abdominal: Soft, NT, ND +BS   Extremities: 1+ pedal edema noted, + peripheral pulses   Skin: No Rashes, Hematoma, Ecchymosis                     Assessment:  61 y/o male with PMH of CABG, DM, HTN, HLD presenting as transfer from [place] for c/f STEMI. PTA arrival received 325 aspirin, 600 plavix, and no heparin drip started. Around 1:30 am patient had multiple episodes of non-bloody diarrhea and emesis with associated abdominal pain and chest pain, unable to localize, non-radiating, with associated SOB and no associated palpitations or diaphoresis. No recent fevers/chills, cough, rashes, or changes in urination. Does report mild diffuse back pain; started 5 days ago in setting on injury while walking and lifting objects. Denies focal weakness, numbness/tingling, or LOC due does report mild dizziness.    Cardiac arrest [facility]p [facility] 11/25  Hemodynamic instability  Hypovolemia  Post op respiratory insufficiency  Thrombocytopenia  Acute blood loss anemia  Leukocytosis     Plan:   ***Neuro***  Addressed analgesic regimen to optimize function and off sedation for ventilatory synchrony and agitation. CT head showed 4mm subdural hematoma 11/26: repeat CT head unchanged.    ***Cardiovascular***  Patient transferred to [facility] 11/25 after being [facility] on [facility] for PEA arrest, ECMO dc'ed 12/8. Unclear initial etiology as patient has both coronary disease with severe left ventricular systolic failure as well as evidence of pancreatitis. Patient was requiring an IV Donny infusion for vasogenic shock, which is now off. Invasive hemodynamic monitoring with a central venous catheter & an A-line were required for the continuous central venous and MAP/BP monitoring to ensure adequate cardiovascular support. ASA continued for graft occlusion-thromboembolism prophylaxis Lipitor was also continued for long term graft patency. Patient continues on PO Midodrine while weaning off IV pressors. Patient is also on PO Amiodarone for rate control.     ***Pulmonary***  Mode: vent off  FiO2: 40               ***Heme***  Anemia due to acute blood loss, no current plan for transfusion. Continue to monitor hemoglobin and hematocrit levels     ***GI***  Patient is tolerating Glucerna tube feeds at 65 cc/hr. Protonix for stress ulcer prophylaxis. Bowel regimen with Miralax and senna. Reglan for gut motility.     ***Renal***  Patient has renal failure and Hyperkalemia - received HD and UF yesterday. Plan for HD again today. Optimize renal perfusion with adequate volume resuscitation and continued monitoring of urine output, fluid balance, electrolytes, and BUN/Creatinine.      ***ID***  Afebrile, white blood count within normal limits. Continue trending white blood count and monitoring fever curve. On Caspofungin for Candidemia         ***Endocrine***  Metabolic stability, history of diabetes mellitus required review and adjustment of regular Insulin sliding scale and NPH and glycemic regimen while following serial glucose levels to help achieve and maintain euglycemia       Patient requires continuous monitoring with bedside rhythm monitoring, pulse oximetry monitoring, and continuous central venous and arterial pressure monitoring; and intermittent blood gas analysis. Care plan discussed with the ICU care team.   Patient remained critical, at risk for life threatening decompensation.    I have spent 30 minutes providing critical care management to this patient.    By signing my name below, I, Maria D'Amico, attest that this documentation has been prepared under the direction and in the presence of Dinorah Rubio MD   Electronically signed: Maria D'Amico, Scribe, 01-07-23 @ 08:05    I, Dinorah Rubio, personally performed the services described in this documentation. all medical record entries made by the karlaibkyle were at my direction and in my presence. I have reviewed the chart and agree that the record reflects my personal performance and is accurate and complete  Electronically signed: Dinorah Rubio MD  HPI:  Patient is a 63 y/o male with PMH of CABG, DM, HTN, HLD presenting as transfer from [place] for c/f STEMI. PTA arrival received 325 aspirin, 600 plavix, and no heparin drip started. Around 1:30 am patient had multiple episodes of non-bloody diarrhea and emesis with associated abdominal pain and chest pain, unable to localize, non-radiating, with associated SOB and no associated palpitations or diaphoresis. No recent fevers/chills, cough, rashes, or changes in urination. Does report mild diffuse back pain; started 5 days ago in setting on injury while walking and lifting objects. Denies focal weakness, numbness/tingling, or LOC due does report mild dizziness.    Patient seen and examined in ED. Hemodynamically stable, alert and awake, A&Ox3. Daughter at bedside. Reports abdominal pain, and nausea. Abdomen is soft, tender in epigastric area and RUQ. Denies chest pain, SOB. WBC 20, T.bili 3.4, Lipase 300. RUQ US demonstrated gallbladder stones, pericholecystic fluid.  (24 Nov 2022 15:10)      Patient seen and examined at the bedside.    Remained critically ill on continuous ICU monitoring.    OBJECTIVE:  Vital Signs Last 24 Hrs  T(C): 36.6 (07 Jan 2023 08:00), Max: 36.8 (06 Jan 2023 20:45)  T(F): 97.9 (07 Jan 2023 08:00), Max: 98.2 (06 Jan 2023 20:45)  HR: 111 (07 Jan 2023 07:00) (104 - 119)  BP: --  BP(mean): --  RR: 23 (07 Jan 2023 07:00) (11 - 40)  SpO2: 97% (07 Jan 2023 07:00) (79% - 100%)    Parameters below as of 07 Jan 2023 08:00  Patient On (Oxygen Delivery Method): tracheostomy collar    O2 Concentration (%): 40    Physical Exam:   General: Intubated, multiple lines gtt  Neurology: mildly sedated, but able to follow commands   Eyes: bilateral pupils equal and reactive   ENT/Neck: +ETT midline, Neck supple, trachea midline, No JVD   Respiratory: rhonchi bilaterally   CV: S1S2, no murmurs        [x] Sinus rhythm  Abdominal: Soft, NT, ND +BS   Extremities: 1+ pedal edema noted, + peripheral pulses   Skin: No Rashes, Hematoma, Ecchymosis                     Assessment:  63 y/o male with PMH of CABG, DM, HTN, HLD presenting as transfer from [place] for c/f STEMI. PTA arrival received 325 aspirin, 600 plavix, and no heparin drip started. Around 1:30 am patient had multiple episodes of non-bloody diarrhea and emesis with associated abdominal pain and chest pain, unable to localize, non-radiating, with associated SOB and no associated palpitations or diaphoresis. No recent fevers/chills, cough, rashes, or changes in urination. Does report mild diffuse back pain; started 5 days ago in setting on injury while walking and lifting objects. Denies focal weakness, numbness/tingling, or LOC due does report mild dizziness.    Cardiac arrest s/p [facility] ECMO 11/25  Hemodynamic instability  Hypovolemia  Post op respiratory insufficiency  Thrombocytopenia  Acute blood loss anemia  Leukocytosis     Plan:   ***Neuro***  Addressed analgesic regimen to optimize function and off sedation for ventilatory synchrony and agitation. CT head showed 4mm subdural hematoma 11/26: repeat CT head unchanged.    ***Cardiovascular***  Patient transferred to [facility] 11/25 after being placed on [facility] ECMO for PEA arrest, ECMO dc'ed 12/8. Unclear initial etiology as patient has both coronary disease with severe left ventricular systolic failure as well as evidence of pancreatitis. Patient was requiring an IV Donny infusion for vasogenic shock, which is now off. Invasive hemodynamic monitoring with a central venous catheter & an A-line were required for the continuous central venous and MAP/BP monitoring to ensure adequate cardiovascular support. ASA continued for graft occlusion-thromboembolism prophylaxis Lipitor was also continued for long term graft patency. Patient continues on PO Midodrine while weaning off IV pressors. Patient is also on PO Amiodarone for rate control.     ***Pulmonary***  Mode: vent off  FiO2: 40               ***Heme***  Anemia due to acute blood loss, no current plan for transfusion. Continue to monitor hemoglobin and hematocrit levels     ***GI***  Patient is tolerating Glucerna tube feeds at 65 cc/hr. Protonix for stress ulcer prophylaxis. Bowel regimen with Miralax and senna. Reglan for gut motility.     ***Renal***  Patient has renal failure and Hyperkalemia - received HD and UF yesterday. Plan for HD again today. Optimize renal perfusion with adequate volume resuscitation and continued monitoring of urine output, fluid balance, electrolytes, and BUN/Creatinine.      ***ID***  Afebrile, white blood count within normal limits. Continue trending white blood count and monitoring fever curve. On Caspofungin for Candidemia         ***Endocrine***  Metabolic stability, history of diabetes mellitus required review and adjustment of regular Insulin sliding scale and NPH and glycemic regimen while following serial glucose levels to help achieve and maintain euglycemia       Patient requires continuous monitoring with bedside rhythm monitoring, pulse oximetry monitoring, and continuous central venous and arterial pressure monitoring; and intermittent blood gas analysis. Care plan discussed with the ICU care team.   Patient remained critical, at risk for life threatening decompensation.    I have spent 30 minutes providing critical care management to this patient.    By signing my name below, I, Maria D'Amico, attest that this documentation has been prepared under the direction and in the presence of Dinorah Rubio MD   Electronically signed: Maria D'Amico, Scribe, 01-07-23 @ 08:05    I, Dinorah Rubio, personally performed the services described in this documentation. all medical record entries made by the karlaibkyle were at my direction and in my presence. I have reviewed the chart and agree that the record reflects my personal performance and is accurate and complete  Electronically signed: Dinorah Rubio MD  HPI:  Patient is a 61 y/o male with PMH of CABG, DM, HTN, HLD presenting as transfer from [place] for c/f STEMI. PTA arrival received 325 aspirin, 600 plavix, and no heparin drip started. Around 1:30 am patient had multiple episodes of non-bloody diarrhea and emesis with associated abdominal pain and chest pain, unable to localize, non-radiating, with associated SOB and no associated palpitations or diaphoresis. No recent fevers/chills, cough, rashes, or changes in urination. Does report mild diffuse back pain; started 5 days ago in setting on injury while walking and lifting objects. Denies focal weakness, numbness/tingling, or LOC due does report mild dizziness.    Patient seen and examined in ED. Hemodynamically stable, alert and awake, A&Ox3. Daughter at bedside. Reports abdominal pain, and nausea. Abdomen is soft, tender in epigastric area and RUQ. Denies chest pain, SOB. WBC 20, T.bili 3.4, Lipase 300. RUQ US demonstrated gallbladder stones, pericholecystic fluid.  (24 Nov 2022 15:10)      Patient seen and examined at the bedside.    Remained critically ill on continuous ICU monitoring.    OBJECTIVE:  Vital Signs Last 24 Hrs  T(C): 36.6 (07 Jan 2023 08:00), Max: 36.8 (06 Jan 2023 20:45)  T(F): 97.9 (07 Jan 2023 08:00), Max: 98.2 (06 Jan 2023 20:45)  HR: 111 (07 Jan 2023 07:00) (104 - 119)  BP: --  BP(mean): --  RR: 23 (07 Jan 2023 07:00) (11 - 40)  SpO2: 97% (07 Jan 2023 07:00) (79% - 100%)    Parameters below as of 07 Jan 2023 08:00  Patient On (Oxygen Delivery Method): tracheostomy collar    O2 Concentration (%): 40    Physical Exam:   General: Intubated, multiple lines gtt  Neurology: mildly sedated, but able to follow commands   Eyes: bilateral pupils equal and reactive   ENT/Neck: +ETT midline, Neck supple, trachea midline, No JVD   Respiratory: rhonchi bilaterally   CV: S1S2, no murmurs        [x] Sinus tachycardia  Abdominal: Soft, NT, ND +BS   Extremities: 1+ pedal edema noted, + peripheral pulses   Skin: No Rashes, Hematoma, Ecchymosis                     Assessment:  61 y/o male with PMH of CABG, DM, HTN, HLD presenting as transfer from [place] for c/f STEMI. PTA arrival received 325 aspirin, 600 plavix, and no heparin drip started. Around 1:30 am patient had multiple episodes of non-bloody diarrhea and emesis with associated abdominal pain and chest pain, unable to localize, non-radiating, with associated SOB and no associated palpitations or diaphoresis. No recent fevers/chills, cough, rashes, or changes in urination. Does report mild diffuse back pain; started 5 days ago in setting on injury while walking and lifting objects. Denies focal weakness, numbness/tingling, or LOC due does report mild dizziness.    Cardiac arrest s/p [facility] ECMO 11/25  Hemodynamic instability  Hypovolemia  Post op respiratory insufficiency  Thrombocytopenia  Acute blood loss anemia  Leukocytosis     Plan:   ***Neuro***  Addressed analgesic regimen to optimize function and off sedation for ventilatory synchrony and agitation. CT head showed 4mm subdural hematoma 11/26: repeat CT head unchanged.    ***Cardiovascular***  Patient transferred to [facility] 11/25 after being placed on [facility] ECMO for PEA arrest, ECMO dc'ed 12/8. Unclear initial etiology as patient has both coronary disease with severe left ventricular systolic failure as well as evidence of pancreatitis. Patient was requiring an IV Donny infusion for vasogenic shock, which is now off. Invasive hemodynamic monitoring with a central venous catheter & an A-line were required for the continuous central venous and MAP/BP monitoring to ensure adequate cardiovascular support. ASA continued for graft occlusion-thromboembolism prophylaxis Lipitor was also continued for long term graft patency. Patient continues on PO Midodrine while weaning off IV pressors. Patient is also on PO Amiodarone for rate control. Plan to restart Lopressor which is currently on hold.    ***Pulmonary***  Mode: vent off  FiO2: 40               ***Heme***  Anemia due to acute blood loss, no current plan for transfusion. Continue to monitor hemoglobin and hematocrit levels     ***GI***  Patient is tolerating Glucerna tube feeds at 65 cc/hr. Protonix for stress ulcer prophylaxis. Bowel regimen with Miralax and senna. Reglan for gut motility.     ***Renal***  Patient has renal failure and Hyperkalemia - received HD and UF yesterday. Plan for HD again today. Optimize renal perfusion with adequate volume resuscitation and continued monitoring of urine output, fluid balance, electrolytes, and BUN/Creatinine.      ***ID***  Afebrile, white blood count within normal limits. Continue trending white blood count and monitoring fever curve. On Caspofungin for Candidemia. All cultures negative to date.        ***Endocrine***  Metabolic stability, history of diabetes mellitus required review and adjustment of regular Insulin sliding scale and NPH and glycemic regimen while following serial glucose levels to help achieve and maintain euglycemia       Patient requires continuous monitoring with bedside rhythm monitoring, pulse oximetry monitoring, and continuous central venous and arterial pressure monitoring; and intermittent blood gas analysis. Care plan discussed with the ICU care team.   Patient remained critical, at risk for life threatening decompensation.    I have spent 30 minutes providing critical care management to this patient.    By signing my name below, I, Maria D'Amico, attest that this documentation has been prepared under the direction and in the presence of Dinorah Rubio MD   Electronically signed: Maria D'Amico, Scribe, 01-07-23 @ 08:05    I, Dinorah Rubio, personally performed the services described in this documentation. all medical record entries made by the karlaibkyle were at my direction and in my presence. I have reviewed the chart and agree that the record reflects my personal performance and is accurate and complete  Electronically signed: Dinorah Rubio MD  HPI:  Patient is a 61 y/o male with PMH of CABG, DM, HTN, HLD presenting as transfer from [place] for c/f STEMI. PTA arrival received 325 aspirin, 600 plavix, and no heparin drip started. Around 1:30 am patient had multiple episodes of non-bloody diarrhea and emesis with associated abdominal pain and chest pain, unable to localize, non-radiating, with associated SOB and no associated palpitations or diaphoresis. No recent fevers/chills, cough, rashes, or changes in urination. Does report mild diffuse back pain; started 5 days ago in setting on injury while walking and lifting objects. Denies focal weakness, numbness/tingling, or LOC due does report mild dizziness.    Patient seen and examined in ED. Hemodynamically stable, alert and awake, A&Ox3. Daughter at bedside. Reports abdominal pain, and nausea. Abdomen is soft, tender in epigastric area and RUQ. Denies chest pain, SOB. WBC 20, T.bili 3.4, Lipase 300. RUQ US demonstrated gallbladder stones, pericholecystic fluid.  (24 Nov 2022 15:10)      Patient seen and examined at the bedside.    Remained critically ill on continuous ICU monitoring.    OBJECTIVE:  Vital Signs Last 24 Hrs  T(C): 36.6 (07 Jan 2023 08:00), Max: 36.8 (06 Jan 2023 20:45)  T(F): 97.9 (07 Jan 2023 08:00), Max: 98.2 (06 Jan 2023 20:45)  HR: 111 (07 Jan 2023 07:00) (104 - 119)  BP: --  BP(mean): --  RR: 23 (07 Jan 2023 07:00) (11 - 40)  SpO2: 97% (07 Jan 2023 07:00) (79% - 100%)    Parameters below as of 07 Jan 2023 08:00  Patient On (Oxygen Delivery Method): tracheostomy collar    O2 Concentration (%): 40    Physical Exam:   General: Intubated, multiple lines gtt  Neurology: mildly sedated, but able to follow commands   Eyes: bilateral pupils equal and reactive   ENT/Neck: +ETT midline, Neck supple, trachea midline, No JVD   Respiratory: rhonchi bilaterally   CV: S1S2, no murmurs        [x] Sinus tachycardia  Abdominal: Soft, NT, ND +BS   Extremities: 1+ pedal edema noted, + peripheral pulses   Skin: No Rashes, Hematoma, Ecchymosis                     Assessment:  61 y/o male with PMH of CABG, DM, HTN, HLD presenting as transfer from [place] for c/f STEMI. PTA arrival received 325 aspirin, 600 plavix, and no heparin drip started. Around 1:30 am patient had multiple episodes of non-bloody diarrhea and emesis with associated abdominal pain and chest pain, unable to localize, non-radiating, with associated SOB and no associated palpitations or diaphoresis. No recent fevers/chills, cough, rashes, or changes in urination. Does report mild diffuse back pain; started 5 days ago in setting on injury while walking and lifting objects. Denies focal weakness, numbness/tingling, or LOC due does report mild dizziness.    Cardiac arrest s/p [facility] ECMO 11/25  Hemodynamic instability  Hypovolemia  Post op respiratory insufficiency  Thrombocytopenia  Acute blood loss anemia  Leukocytosis     Plan:   ***Neuro***  Addressed analgesic regimen to optimize function and off sedation for ventilatory synchrony and agitation. CT head showed 4mm subdural hematoma 11/26: repeat CT head unchanged.    ***Cardiovascular***  Patient transferred to [facility] 11/25 after being placed on [facility] ECMO for PEA arrest, ECMO dc'ed 12/8. Unclear initial etiology as patient has both coronary disease with severe left ventricular systolic failure as well as evidence of pancreatitis. Patient was requiring an IV Donny infusion for vasogenic shock, which is now off. Invasive hemodynamic monitoring with a central venous catheter & an A-line were required for the continuous central venous and MAP/BP monitoring to ensure adequate cardiovascular support. ASA continued for graft occlusion-thromboembolism prophylaxis Lipitor was also continued for long term graft patency. Patient continues on PO Midodrine while weaning off IV pressors. Patient is also on PO Amiodarone for rate control. Plan to restart Lopressor which is currently on hold.    ***Pulmonary***  Mode: vent off  FiO2: 40               ***Heme***  Anemia due to acute blood loss, no current plan for transfusion. Continue to monitor hemoglobin and hematocrit levels     ***GI***  Patient is tolerating Glucerna tube feeds at 65 cc/hr. Protonix for stress ulcer prophylaxis. Bowel regimen with Miralax and senna. Reglan for gut motility.     ***Renal***  Patient has renal failure and Hyperkalemia - received HD and UF yesterday. Plan for HD again today. Optimize renal perfusion with adequate volume resuscitation and continued monitoring of urine output, fluid balance, electrolytes, and BUN/Creatinine.      ***ID***  Afebrile, white blood count within normal limits. Continue trending white blood count and monitoring fever curve. On Caspofungin for Candidemia. All cultures negative to date.        ***Endocrine***  Metabolic stability, history of diabetes mellitus required review and adjustment of regular Insulin sliding scale and NPH and glycemic regimen while following serial glucose levels to help achieve and maintain euglycemia       Patient requires continuous monitoring with bedside rhythm monitoring, pulse oximetry monitoring, and continuous central venous and arterial pressure monitoring; and intermittent blood gas analysis. Care plan discussed with the ICU care team.   Patient remained critical, at risk for life threatening decompensation.    I have spent 30 minutes providing critical care management to this patient.    By signing my name below, I, Maria D'Amico, attest that this documentation has been prepared under the direction and in the presence of Dinorah Rubio MD   Electronically signed: Maria D'Amico, Scribe, 01-07-23 @ 08:05    I, Dinorah Rubio, personally performed the services described in this documentation. all medical record entries made by the karlaibkyle were at my direction and in my presence. I have reviewed the chart and agree that the record reflects my personal performance and is accurate and complete  Electronically signed: Dinorah Rubio MD  HPI:  Patient is a 61 y/o male with PMH of CABG, DM, HTN, HLD presenting as transfer from [place] for c/f STEMI. PTA arrival received 325 aspirin, 600 plavix, and no heparin drip started. Around 1:30 am patient had multiple episodes of non-bloody diarrhea and emesis with associated abdominal pain and chest pain, unable to localize, non-radiating, with associated SOB and no associated palpitations or diaphoresis. No recent fevers/chills, cough, rashes, or changes in urination. Does report mild diffuse back pain; started 5 days ago in setting on injury while walking and lifting objects. Denies focal weakness, numbness/tingling, or LOC due does report mild dizziness.    Patient seen and examined in ED. Hemodynamically stable, alert and awake, A&Ox3. Daughter at bedside. Reports abdominal pain, and nausea. Abdomen is soft, tender in epigastric area and RUQ. Denies chest pain, SOB. WBC 20, T.bili 3.4, Lipase 300. RUQ US demonstrated gallbladder stones, pericholecystic fluid.  (24 Nov 2022 15:10)      Patient seen and examined at the bedside.    Remained critically ill on continuous ICU monitoring.    OBJECTIVE:  Vital Signs Last 24 Hrs  T(C): 36.6 (07 Jan 2023 08:00), Max: 36.8 (06 Jan 2023 20:45)  T(F): 97.9 (07 Jan 2023 08:00), Max: 98.2 (06 Jan 2023 20:45)  HR: 111 (07 Jan 2023 07:00) (104 - 119)  BP: --  BP(mean): --  RR: 23 (07 Jan 2023 07:00) (11 - 40)  SpO2: 97% (07 Jan 2023 07:00) (79% - 100%)    Parameters below as of 07 Jan 2023 08:00  Patient On (Oxygen Delivery Method): tracheostomy collar    O2 Concentration (%): 40    Physical Exam:   General: Intubated, multiple lines gtt  Neurology: mildly sedated, but able to follow commands   Eyes: bilateral pupils equal and reactive   ENT/Neck: +ETT midline, Neck supple, trachea midline, No JVD   Respiratory: rhonchi bilaterally   CV: S1S2, no murmurs        [x] Sinus tachycardia  Abdominal: Soft, NT, ND +BS   Extremities: 1+ pedal edema noted, + peripheral pulses   Skin: No Rashes, Hematoma, Ecchymosis                     Assessment:  61 y/o male with PMH of CABG, DM, HTN, HLD presenting as transfer from [place] for c/f STEMI. PTA arrival received 325 aspirin, 600 plavix, and no heparin drip started. Around 1:30 am patient had multiple episodes of non-bloody diarrhea and emesis with associated abdominal pain and chest pain, unable to localize, non-radiating, with associated SOB and no associated palpitations or diaphoresis. No recent fevers/chills, cough, rashes, or changes in urination. Does report mild diffuse back pain; started 5 days ago in setting on injury while walking and lifting objects. Denies focal weakness, numbness/tingling, or LOC due does report mild dizziness.    Cardiac arrest s/p [facility] ECMO 11/25  Hemodynamic instability  Hypovolemia  Post op respiratory insufficiency  Thrombocytopenia  Acute blood loss anemia  Leukocytosis     Plan:   ***Neuro***  Addressed analgesic regimen to optimize function and off sedation for ventilatory synchrony and agitation. CT head showed 4mm subdural hematoma 11/26: repeat CT head unchanged.    ***Cardiovascular***  Patient transferred to [facility] 11/25 after being placed on [facility] ECMO for PEA arrest, ECMO dc'ed 12/8. Unclear initial etiology as patient has both coronary disease with severe left ventricular systolic failure as well as evidence of pancreatitis. Patient was requiring an IV Donny infusion for vasogenic shock, which is now off. Invasive hemodynamic monitoring with a central venous catheter & an A-line were required for the continuous central venous and MAP/BP monitoring to ensure adequate cardiovascular support. ASA continued for graft occlusion-thromboembolism prophylaxis Lipitor was also continued for long term graft patency. Patient continues on PO Midodrine while weaning off IV pressors. Patient is also on PO Amiodarone for rate control. Plan to restart Lopressor which is currently on hold.    ***Pulmonary***  Mode: vent off  FiO2: 40               ***Heme***  Anemia due to acute blood loss, no current plan for transfusion. Continue to monitor hemoglobin and hematocrit levels     ***GI***  Patient is tolerating Glucerna tube feeds at 65 cc/hr. Protonix for stress ulcer prophylaxis. Bowel regimen with Miralax and senna. Reglan for gut motility.     ***Renal***  Patient has renal failure and Hyperkalemia - received HD and UF yesterday. Plan for HD again today. Optimize renal perfusion with adequate volume resuscitation and continued monitoring of urine output, fluid balance, electrolytes, and BUN/Creatinine.      ***ID***  Afebrile, white blood count within normal limits. Continue trending white blood count and monitoring fever curve. On Caspofungin for Candidemia. All cultures negative to date.        ***Endocrine***  Metabolic stability, history of diabetes mellitus required review and adjustment of regular Insulin sliding scale and NPH and glycemic regimen while following serial glucose levels to help achieve and maintain euglycemia       Patient requires continuous monitoring with bedside rhythm monitoring, pulse oximetry monitoring, and continuous central venous and arterial pressure monitoring; and intermittent blood gas analysis. Care plan discussed with the ICU care team.   Patient remained critical, at risk for life threatening decompensation.    I have spent 30 minutes providing critical care management to this patient.    By signing my name below, I, Maria D'Amico, attest that this documentation has been prepared under the direction and in the presence of Dinorah Rubio MD   Electronically signed: Maria D'Amico, Scribe, 01-07-23 @ 08:05    I, Dinorah Rubio, personally performed the services described in this documentation. all medical record entries made by the karlaibkyle were at my direction and in my presence. I have reviewed the chart and agree that the record reflects my personal performance and is accurate and complete  Electronically signed: Dinorah Rubio MD  HPI:  Patient is a 63 y/o male with PMH of CABG, DM, HTN, HLD presenting as transfer from [place] for c/f STEMI. PTA arrival received 325 aspirin, 600 plavix, and no heparin drip started. Around 1:30 am patient had multiple episodes of non-bloody diarrhea and emesis with associated abdominal pain and chest pain, unable to localize, non-radiating, with associated SOB and no associated palpitations or diaphoresis. No recent fevers/chills, cough, rashes, or changes in urination. Does report mild diffuse back pain; started 5 days ago in setting on injury while walking and lifting objects. Denies focal weakness, numbness/tingling, or LOC due does report mild dizziness.    Patient seen and examined in ED. Hemodynamically stable, alert and awake, A&Ox3. Daughter at bedside. Reports abdominal pain, and nausea. Abdomen is soft, tender in epigastric area and RUQ. Denies chest pain, SOB. WBC 20, T.bili 3.4, Lipase 300. RUQ US demonstrated gallbladder stones, pericholecystic fluid.  (24 Nov 2022 15:10)      Patient seen and examined at the bedside.    Remained critically ill on continuous ICU monitoring.    OBJECTIVE:  Vital Signs Last 24 Hrs  T(C): 36.6 (07 Jan 2023 08:00), Max: 36.8 (06 Jan 2023 20:45)  T(F): 97.9 (07 Jan 2023 08:00), Max: 98.2 (06 Jan 2023 20:45)  HR: 111 (07 Jan 2023 07:00) (104 - 119)  BP: --  BP(mean): --  RR: 23 (07 Jan 2023 07:00) (11 - 40)  SpO2: 97% (07 Jan 2023 07:00) (79% - 100%)    Parameters below as of 07 Jan 2023 08:00  Patient On (Oxygen Delivery Method): tracheostomy collar    O2 Concentration (%): 40    Physical Exam:   General: OOB to chair, breathing comfortably on trach collar  Neurology: mildly drowsy, but able to follow commands   Eyes: bilateral pupils equal and reactive   ENT/Neck: +tracheostomy in place, Neck supple, No JVD   Respiratory: Lungs clear   CV: S1S2, no murmurs        [x] Sinus tachycardia  Abdominal: Soft, NT, ND +BS   Extremities: 1+ pedal edema noted, + peripheral pulses   Skin: No Rashes, Hematoma, Ecchymosis                     Assessment:  63 y/o male with PMH of CABG, DM, HTN, HLD presenting as transfer from [place] for c/f STEMI. PTA arrival received 325 aspirin, 600 plavix, and no heparin drip started. Around 1:30 am patient had multiple episodes of non-bloody diarrhea and emesis with associated abdominal pain and chest pain, unable to localize, non-radiating, with associated SOB and no associated palpitations or diaphoresis. No recent fevers/chills, cough, rashes, or changes in urination. Does report mild diffuse back pain; started 5 days ago in setting on injury while walking and lifting objects. Denies focal weakness, numbness/tingling, or LOC due does report mild dizziness.    Cardiac arrest s/p VA ECMO 11/25  Hemodynamic instability  Hypovolemia  Post op respiratory insufficiency  Thrombocytopenia  Acute blood loss anemia  Leukocytosis     Plan:   ***Neuro***  Addressed analgesic regimen to optimize function and off sedation for ventilatory synchrony and agitation. CT head showed 4mm subdural hematoma 11/26: repeat CT head unchanged.    ***Cardiovascular***  Patient transferred to [facility] 11/25 after being placed on [facility] ECMO for PEA arrest, ECMO dc'ed 12/8. Unclear initial etiology as patient has both coronary disease with severe left ventricular systolic failure as well as evidence of pancreatitis. Recent Echo shows EF of 22%. Patient was requiring an IV Donny infusion for vasogenic shock, which is now off. Midodrine ordered q 8 hourly. Invasive hemodynamic monitoring with a central venous catheter & an A-line were required for the continuous central venous and MAP/BP monitoring to ensure adequate cardiovascular support. ASA continued for graft occlusion-thromboembolism prophylaxis Lipitor was also continued for long term graft patency.  Patient is also on PO Amiodarone for rate and rhythm control. Plan to restart Lopressor which is currently on hold.    ***Pulmonary***  Respiratory status required supplemental oxygen via trach collar. Patient has not returned to the ventilator since 1/04. Continue close monitoring of respiratory rate, breathing pattern, pulse oximetry and intermittent blood gas analysis.         ***Heme***  Anemia due to acute blood loss, no current plan for transfusion. Continue to monitor hemoglobin and hematocrit levels     ***GI***  Patient is tolerating Glucerna tube feeds at 65 cc/hr. Protonix for stress ulcer prophylaxis. Bowel regimen with Miralax and senna. Reglan for gut motility.     ***Renal***  Patient has renal failure and Hyperkalemia - received HD and UF yesterday. Plan for HD again today. Optimize renal perfusion with adequate volume resuscitation and continued monitoring of urine output, fluid balance, electrolytes, and BUN/Creatinine.      ***ID***  Afebrile, white blood count within normal limits. Continue trending white blood count and monitoring fever curve. On Caspofungin for Candidemia with a positive blood culture 12/26. Blood culture with Staph epi 1/02 likely a contaminant.    ***Endocrine***  Metabolic stability, history of diabetes mellitus required review and adjustment of regular Insulin sliding scale and NPH and glycemic regimen while following serial glucose levels to help achieve and maintain euglycemia       Patient requires continuous monitoring with bedside rhythm monitoring, pulse oximetry monitoring, and continuous central venous and arterial pressure monitoring; and intermittent blood gas analysis. Care plan discussed with the ICU care team.   Patient remained critical, at risk for life threatening decompensation.    I have spent 30 minutes providing critical care management to this patient.    By signing my name below, I, Maria D'Amico, attest that this documentation has been prepared under the direction and in the presence of Dinorah Rubio MD   Electronically signed: Maria D'Amico, Scribe, 01-07-23 @ 08:05    IDinorah, personally performed the services described in this documentation. all medical record entries made by the scribe were at my direction and in my presence. I have reviewed the chart and agree that the record reflects my personal performance and is accurate and complete  Electronically signed: Dinorah Rubio MD  HPI:  Patient is a 63 y/o male with PMH of CABG, DM, HTN, HLD presenting as transfer from [place] for c/f STEMI. PTA arrival received 325 aspirin, 600 plavix, and no heparin drip started. Around 1:30 am patient had multiple episodes of non-bloody diarrhea and emesis with associated abdominal pain and chest pain, unable to localize, non-radiating, with associated SOB and no associated palpitations or diaphoresis. No recent fevers/chills, cough, rashes, or changes in urination. Does report mild diffuse back pain; started 5 days ago in setting on injury while walking and lifting objects. Denies focal weakness, numbness/tingling, or LOC due does report mild dizziness.    Patient seen and examined in ED. Hemodynamically stable, alert and awake, A&Ox3. Daughter at bedside. Reports abdominal pain, and nausea. Abdomen is soft, tender in epigastric area and RUQ. Denies chest pain, SOB. WBC 20, T.bili 3.4, Lipase 300. RUQ US demonstrated gallbladder stones, pericholecystic fluid.  (24 Nov 2022 15:10)      Patient seen and examined at the bedside.    Remained critically ill on continuous ICU monitoring.    OBJECTIVE:  Vital Signs Last 24 Hrs  T(C): 36.6 (07 Jan 2023 08:00), Max: 36.8 (06 Jan 2023 20:45)  T(F): 97.9 (07 Jan 2023 08:00), Max: 98.2 (06 Jan 2023 20:45)  HR: 111 (07 Jan 2023 07:00) (104 - 119)  BP: --  BP(mean): --  RR: 23 (07 Jan 2023 07:00) (11 - 40)  SpO2: 97% (07 Jan 2023 07:00) (79% - 100%)    Parameters below as of 07 Jan 2023 08:00  Patient On (Oxygen Delivery Method): tracheostomy collar    O2 Concentration (%): 40    Physical Exam:   General: OOB to chair, breathing comfortably on trach collar  Neurology: mildly drowsy, but able to follow commands   Eyes: bilateral pupils equal and reactive   ENT/Neck: +tracheostomy in place, Neck supple, No JVD   Respiratory: Lungs clear   CV: S1S2, no murmurs        [x] Sinus tachycardia  Abdominal: Soft, NT, ND +BS   Extremities: 1+ pedal edema noted, + peripheral pulses   Skin: No Rashes, Hematoma, Ecchymosis                     Assessment:  63 y/o male with PMH of CABG, DM, HTN, HLD presenting as transfer from [place] for c/f STEMI. PTA arrival received 325 aspirin, 600 plavix, and no heparin drip started. Around 1:30 am patient had multiple episodes of non-bloody diarrhea and emesis with associated abdominal pain and chest pain, unable to localize, non-radiating, with associated SOB and no associated palpitations or diaphoresis. No recent fevers/chills, cough, rashes, or changes in urination. Does report mild diffuse back pain; started 5 days ago in setting on injury while walking and lifting objects. Denies focal weakness, numbness/tingling, or LOC due does report mild dizziness.    Cardiac arrest s/p VA ECMO 11/25  Hemodynamic instability  Hypovolemia  Post op respiratory insufficiency  Thrombocytopenia  Acute blood loss anemia  Leukocytosis     Plan:   ***Neuro***  Addressed analgesic regimen to optimize function and off sedation for ventilatory synchrony and agitation. CT head showed 4mm subdural hematoma 11/26: repeat CT head unchanged.    ***Cardiovascular***  Patient transferred to [facility] 11/25 after being placed on [facility] ECMO for PEA arrest, ECMO dc'ed 12/8. Unclear initial etiology as patient has both coronary disease with severe left ventricular systolic failure as well as evidence of pancreatitis. Recent Echo shows EF of 22%. Patient was requiring an IV Donny infusion for vasogenic shock, which is now off. Midodrine ordered q 8 hourly. Invasive hemodynamic monitoring with a central venous catheter & an A-line were required for the continuous central venous and MAP/BP monitoring to ensure adequate cardiovascular support. ASA continued for graft occlusion-thromboembolism prophylaxis Lipitor was also continued for long term graft patency.  Patient is also on PO Amiodarone for rate and rhythm control. Plan to restart Lopressor which is currently on hold.    ***Pulmonary***  Respiratory status required supplemental oxygen via trach collar. Patient has not returned to the ventilator since 1/04. Continue close monitoring of respiratory rate, breathing pattern, pulse oximetry and intermittent blood gas analysis.         ***Heme***  Anemia due to acute blood loss, no current plan for transfusion. Continue to monitor hemoglobin and hematocrit levels     ***GI***  Patient is tolerating Glucerna tube feeds at 65 cc/hr. Protonix for stress ulcer prophylaxis. Bowel regimen with Miralax and senna. Reglan for gut motility.     ***Renal***  Patient has renal failure and Hyperkalemia - received HD and UF yesterday. Plan for HD again today. Optimize renal perfusion with adequate volume resuscitation and continued monitoring of urine output, fluid balance, electrolytes, and BUN/Creatinine.      ***ID***  Afebrile, white blood count within normal limits. Continue trending white blood count and monitoring fever curve. On Caspofungin for Candidemia with a positive blood culture 12/26. Blood culture with Staph epi 1/02 likely a contaminant.    ***Endocrine***  Metabolic stability, history of diabetes mellitus required review and adjustment of regular Insulin sliding scale and NPH and glycemic regimen while following serial glucose levels to help achieve and maintain euglycemia       Patient requires continuous monitoring with bedside rhythm monitoring, pulse oximetry monitoring, and continuous central venous and arterial pressure monitoring; and intermittent blood gas analysis. Care plan discussed with the ICU care team.   Patient remained critical, at risk for life threatening decompensation.    I have spent 30 minutes providing critical care management to this patient.    By signing my name below, I, Maria D'Amico, attest that this documentation has been prepared under the direction and in the presence of Dinorah Rubio MD   Electronically signed: Maria D'Amico, Scribe, 01-07-23 @ 08:05    IDinorah, personally performed the services described in this documentation. all medical record entries made by the scribe were at my direction and in my presence. I have reviewed the chart and agree that the record reflects my personal performance and is accurate and complete  Electronically signed: Dinorah Rubio MD  HPI:  Patient is a 61 y/o male with PMH of CABG, DM, HTN, HLD presenting as transfer from [place] for c/f STEMI. PTA arrival received 325 aspirin, 600 plavix, and no heparin drip started. Around 1:30 am patient had multiple episodes of non-bloody diarrhea and emesis with associated abdominal pain and chest pain, unable to localize, non-radiating, with associated SOB and no associated palpitations or diaphoresis. No recent fevers/chills, cough, rashes, or changes in urination. Does report mild diffuse back pain; started 5 days ago in setting on injury while walking and lifting objects. Denies focal weakness, numbness/tingling, or LOC due does report mild dizziness.    Patient seen and examined in ED. Hemodynamically stable, alert and awake, A&Ox3. Daughter at bedside. Reports abdominal pain, and nausea. Abdomen is soft, tender in epigastric area and RUQ. Denies chest pain, SOB. WBC 20, T.bili 3.4, Lipase 300. RUQ US demonstrated gallbladder stones, pericholecystic fluid.  (24 Nov 2022 15:10)      Patient seen and examined at the bedside.    Remained critically ill on continuous ICU monitoring.    OBJECTIVE:  Vital Signs Last 24 Hrs  T(C): 36.6 (07 Jan 2023 08:00), Max: 36.8 (06 Jan 2023 20:45)  T(F): 97.9 (07 Jan 2023 08:00), Max: 98.2 (06 Jan 2023 20:45)  HR: 111 (07 Jan 2023 07:00) (104 - 119)  BP: --  BP(mean): --  RR: 23 (07 Jan 2023 07:00) (11 - 40)  SpO2: 97% (07 Jan 2023 07:00) (79% - 100%)    Parameters below as of 07 Jan 2023 08:00  Patient On (Oxygen Delivery Method): tracheostomy collar    O2 Concentration (%): 40    Physical Exam:   General: OOB to chair, breathing comfortably on trach collar  Neurology: mildly drowsy, but able to follow commands   Eyes: bilateral pupils equal and reactive   ENT/Neck: +tracheostomy in place, Neck supple, No JVD   Respiratory: Lungs clear   CV: S1S2, no murmurs        [x] Sinus tachycardia  Abdominal: Soft, NT, ND +BS   Extremities: 1+ pedal edema noted, + peripheral pulses   Skin: No Rashes, Hematoma, Ecchymosis                     Assessment:  61 y/o male with PMH of CABG, DM, HTN, HLD presenting as transfer from [place] for c/f STEMI. PTA arrival received 325 aspirin, 600 plavix, and no heparin drip started. Around 1:30 am patient had multiple episodes of non-bloody diarrhea and emesis with associated abdominal pain and chest pain, unable to localize, non-radiating, with associated SOB and no associated palpitations or diaphoresis. No recent fevers/chills, cough, rashes, or changes in urination. Does report mild diffuse back pain; started 5 days ago in setting on injury while walking and lifting objects. Denies focal weakness, numbness/tingling, or LOC due does report mild dizziness.    Cardiac arrest s/p VA ECMO 11/25  Hemodynamic instability  Hypovolemia  Post op respiratory insufficiency  Thrombocytopenia  Acute blood loss anemia  Leukocytosis     Plan:   ***Neuro***  Addressed analgesic regimen to optimize function and off sedation for ventilatory synchrony and agitation. CT head showed 4mm subdural hematoma 11/26: repeat CT head unchanged.    ***Cardiovascular***  Patient transferred to [facility] 11/25 after being placed on [facility] ECMO for PEA arrest, ECMO dc'ed 12/8. Unclear initial etiology as patient has both coronary disease with severe left ventricular systolic failure as well as evidence of pancreatitis. Recent Echo shows EF of 22%. Patient was requiring an IV Donny infusion for vasogenic shock, which is now off. Midodrine ordered q 8 hourly. Invasive hemodynamic monitoring with a central venous catheter & an A-line were required for the continuous central venous and MAP/BP monitoring to ensure adequate cardiovascular support. ASA continued for graft occlusion-thromboembolism prophylaxis Lipitor was also continued for long term graft patency.  Patient is also on PO Amiodarone for rate and rhythm control. Plan to restart Lopressor which is currently on hold.    ***Pulmonary***  Respiratory status required supplemental oxygen via trach collar. Patient has not returned to the ventilator since 1/04. Continue close monitoring of respiratory rate, breathing pattern, pulse oximetry and intermittent blood gas analysis.         ***Heme***  Anemia due to acute blood loss, no current plan for transfusion. Continue to monitor hemoglobin and hematocrit levels     ***GI***  Patient is tolerating Glucerna tube feeds at 65 cc/hr. Protonix for stress ulcer prophylaxis. Bowel regimen with Miralax and senna. Reglan for gut motility.     ***Renal***  Patient has renal failure and Hyperkalemia - received HD and UF yesterday. Plan for HD again today. Optimize renal perfusion with adequate volume resuscitation and continued monitoring of urine output, fluid balance, electrolytes, and BUN/Creatinine.      ***ID***  Afebrile, white blood count within normal limits. Continue trending white blood count and monitoring fever curve. On Caspofungin for Candidemia with a positive blood culture 12/26. Blood culture with Staph epi 1/02 likely a contaminant.    ***Endocrine***  Metabolic stability, history of diabetes mellitus required review and adjustment of regular Insulin sliding scale and NPH and glycemic regimen while following serial glucose levels to help achieve and maintain euglycemia       Patient requires continuous monitoring with bedside rhythm monitoring, pulse oximetry monitoring, and continuous central venous and arterial pressure monitoring; and intermittent blood gas analysis. Care plan discussed with the ICU care team.   Patient remained critical, at risk for life threatening decompensation.    I have spent 30 minutes providing critical care management to this patient.    By signing my name below, I, Maria D'Amico, attest that this documentation has been prepared under the direction and in the presence of Dinorah Rubio MD   Electronically signed: Maria D'Amico, Scribe, 01-07-23 @ 08:05    IDinorah, personally performed the services described in this documentation. all medical record entries made by the scribe were at my direction and in my presence. I have reviewed the chart and agree that the record reflects my personal performance and is accurate and complete  Electronically signed: Dinorah Rubio MD

## 2023-01-07 NOTE — PROGRESS NOTE ADULT - PROBLEM SELECTOR PLAN 1
Pt with non oliguric ERIC/ ATN in the setting of STEMI, cardiac arrest & cardiogenic shock  SCr on arrival was 2.15; trended down to hector 1.6 on 11/25; slowly trended up & increased to 3.95 11/28. Pt initiated on CRRT/CVVHDF to optimize volume and electrolytes on 12/5/22. Pt likely with ATN. Pt underwent decannulation of ECMO on 12/8/22 and was taken off CRRT.     Pt reinitiated on CRRT overnight on 12/9/22 for volume overload. s/p trach on 12/16. CRRT stopped again 12/19. Bladder scan daily. Now with [facility]. Off Lasix gtt.     Getting daily HD/UF. Remains on TC with FiO2 40%.  Last HD session 1/6 with 1.7L UF. During the last 30 mins of HD bp drop 90's map 55 required cut back fluid removal. CXR remains with b/l pulm vasc congestion. UO in the last 24 hours 220cc. Net positive 300cc in 24 hrs    Will do HD today with 1.5-2L UF as tolerated as per discussion with CTU  Will need tunneled catheter placed for ongoing dialysis needs once cleared from infectious standpoint.   Continue to monitor for renal recovery. Monitor labs and urine output. Avoid any potential nephrotoxins. Dose medications as per HD.

## 2023-01-07 NOTE — PROGRESS NOTE ADULT - SUBJECTIVE AND OBJECTIVE BOX
[facility] Division of Kidney Diseases & Hypertension  FOLLOW UP NOTE  [phone]--------------------------------------------------------------------------------  Chief Complaint:Acute pancreatitis without infection or necrosis      HPI: 63 y/o male with PMH of CABG, DM, HTN, HLD presenting as transfer from [place] for c/f STEMI. Course c/b gallstone pancreatitis leading to ARDS and shock & cardiac arrest now on [facility] ECMO. Had significant troponin elevation and his LVEF down to 8%. Pt was deemed to be too unstable to have an angiogram and potential PCI due to his co-morbidities & a new subdural bleed. Pt being seen for ERIC. SCr on arrival was 2.15; trended down to hector 1.6 on 11/25 and eventually increased to 3.95 11/28. Pt received IV diuretics as per CTU. Pt initiated on CRRT on 12/5/22 to optimize volume status and electrolytes.  Pt underwent decannulation of ECMO on 12/8/22. Pt was restarted on 12/9/22 for volume overload. Pt underwent mitral clip OR (12/16/22). s/p trach on 12/16       24 hour events/subjective: Patient seen & examined. Labs & vitals reviewed. Remains on TC with FiO2 40% since 72 hrs. Bp stable, off pressors. Last HD session 1/6 with 1.7L UF. During the last 30 mins of HD bp drop 90's map 55 required cut back fluid removal. CXR remains with b/l pulm vasc congestion. UO in the last 24 hours 220cc. Net positive 300cc in 24 hrs            PAST HISTORY  --------------------------------------------------------------------------------  No significant changes to PMH, PSH, FHx, SHx, unless otherwise noted    ALLERGIES & MEDICATIONS  --------------------------------------------------------------------------------  Allergies    No Known Allergies    Intolerances      Standing Inpatient Medications  aMIOdarone    Tablet 200 milliGRAM(s) Oral daily  aspirin  chewable 81 milliGRAM(s) Oral daily  atorvastatin 80 milliGRAM(s) Oral at bedtime  caspofungin IVPB 50 milliGRAM(s) IV Intermittent every 24 hours  caspofungin IVPB      chlorhexidine 0.12% Liquid 15 milliLiter(s) Oral Mucosa every 12 hours  chlorhexidine 2% Cloths 1 Application(s) Topical <User Schedule>  epoetin teena-epbx (RETACRIT) Injectable 5000 Unit(s) IV Push <User Schedule>  heparin   Injectable 5000 Unit(s) SubCutaneous every 8 hours  insulin lispro (ADMELOG) corrective regimen sliding scale   SubCutaneous every 4 hours  insulin NPH human recombinant 7 Unit(s) SubCutaneous every 6 hours  metoclopramide Injectable 5 milliGRAM(s) IV Push every 8 hours  midodrine 10 milliGRAM(s) Oral every 8 hours  multivitamin/minerals/iron Oral Solution (CENTRUM) 15 milliLiter(s) Oral daily  pantoprazole  Injectable 40 milliGRAM(s) IV Push daily  polyethylene glycol 3350 17 Gram(s) Oral daily  senna 2 Tablet(s) Oral at bedtime  sevelamer carbonate Powder 800 milliGRAM(s) Oral three times a day with meals  thiamine 100 milliGRAM(s) Enteral Tube daily    PRN Inpatient Medications  acetaminophen    Suspension .. 650 milliGRAM(s) Oral every 6 hours PRN  simethicone 80 milliGRAM(s) Chew daily PRN  sodium chloride 0.9% lock flush 10 milliLiter(s) IV Push every 1 hour PRN      REVIEW OF SYSTEMS  --------------------------------------------------------------------------------  Gen: No chills  Respiratory: No dyspnea, cough  CV: No chest pain  GI: No abdominal pain, diarrhea,  nausea, vomiting  : No dysuria, hematuria  MSK:  + edema  Neuro: No dizziness/lightheadedness      All other systems were reviewed and are negative, except as noted.    VITALS/PHYSICAL EXAM  --------------------------------------------------------------------------------  T(C): 36.6 (01-07-23 @ 08:00), Max: 36.8 (01-06-23 @ 20:45)  HR: 109 (01-07-23 @ 11:00) (104 - 119)  BP: --  RR: 18 (01-07-23 @ 11:00) (10 - 40)  SpO2: 100% (01-07-23 @ 11:00) (79% - 100%)  Wt(kg): --        01-06-23 @ 07:01  -  01-07-23 @ 07:00  --------------------------------------------------------  IN: 2260 mL / OUT: 1925 mL / NET: 335 mL    01-07-23 @ 07:01  -  01-07-23 @ 11:18  --------------------------------------------------------  IN: 410 mL / OUT: 40 mL / NET: 370 mL      Physical Exam:  	Gen: NAD  	HEENT: Anicteric  	Pulm: +TC   	CV: S1S2+  	Abd: Soft, +BS      	Ext: trace LE edema B/L  	Neuro: Awake  	Skin: Warm and dry  	Vascular access: RIJ non tunneled catheter    LABS/STUDIES  --------------------------------------------------------------------------------              9.1    13.63 >-----------<  138      [01-07-23 @ 00:49]              29.3     137  |  97  |  45  ----------------------------<  156      [01-07-23 @ 00:48]  4.3   |  26  |  2.71        Ca     9.6     [01-07-23 @ 00:48]      Mg     2.6     [01-07-23 @ 00:48]      Phos  3.4     [01-07-23 @ 00:48]    TPro  7.7  /  Alb  3.6  /  TBili  1.3  /  DBili  x   /  AST  26  /  ALT  14  /  AlkPhos  84  [01-07-23 @ 00:48]          Creatinine Trend:  SCr 2.71 [01-07 @ 00:48]  SCr 3.80 [01-06 @ 00:38]  SCr 2.77 [01-05 @ 00:11]  SCr 3.71 [01-04 @ 00:27]  SCr 3.27 [01-03 @ 08:36]          HBsAg Nonreact      [01-04-23 @ 10:02]     [facility] Division of Kidney Diseases & Hypertension  FOLLOW UP NOTE  [phone]--------------------------------------------------------------------------------  Chief Complaint:Acute pancreatitis without infection or necrosis      HPI: 61 y/o male with PMH of CABG, DM, HTN, HLD presenting as transfer from [place] for c/f STEMI. Course c/b gallstone pancreatitis leading to ARDS and shock & cardiac arrest now on [facility] ECMO. Had significant troponin elevation and his LVEF down to 8%. Pt was deemed to be too unstable to have an angiogram and potential PCI due to his co-morbidities & a new subdural bleed. Pt being seen for ERIC. SCr on arrival was 2.15; trended down to hector 1.6 on 11/25 and eventually increased to 3.95 11/28. Pt received IV diuretics as per CTU. Pt initiated on CRRT on 12/5/22 to optimize volume status and electrolytes.  Pt underwent decannulation of ECMO on 12/8/22. Pt was restarted on 12/9/22 for volume overload. Pt underwent mitral clip OR (12/16/22). s/p trach on 12/16       24 hour events/subjective: Patient seen & examined. Labs & vitals reviewed. Remains on TC with FiO2 40% since 72 hrs. Bp stable, off pressors. Last HD session 1/6 with 1.7L UF. During the last 30 mins of HD bp drop 90's map 55 required cut back fluid removal. CXR remains with b/l pulm vasc congestion. UO in the last 24 hours 220cc. Net positive 300cc in 24 hrs            PAST HISTORY  --------------------------------------------------------------------------------  No significant changes to PMH, PSH, FHx, SHx, unless otherwise noted    ALLERGIES & MEDICATIONS  --------------------------------------------------------------------------------  Allergies    No Known Allergies    Intolerances      Standing Inpatient Medications  aMIOdarone    Tablet 200 milliGRAM(s) Oral daily  aspirin  chewable 81 milliGRAM(s) Oral daily  atorvastatin 80 milliGRAM(s) Oral at bedtime  caspofungin IVPB 50 milliGRAM(s) IV Intermittent every 24 hours  caspofungin IVPB      chlorhexidine 0.12% Liquid 15 milliLiter(s) Oral Mucosa every 12 hours  chlorhexidine 2% Cloths 1 Application(s) Topical <User Schedule>  epoetin teena-epbx (RETACRIT) Injectable 5000 Unit(s) IV Push <User Schedule>  heparin   Injectable 5000 Unit(s) SubCutaneous every 8 hours  insulin lispro (ADMELOG) corrective regimen sliding scale   SubCutaneous every 4 hours  insulin NPH human recombinant 7 Unit(s) SubCutaneous every 6 hours  metoclopramide Injectable 5 milliGRAM(s) IV Push every 8 hours  midodrine 10 milliGRAM(s) Oral every 8 hours  multivitamin/minerals/iron Oral Solution (CENTRUM) 15 milliLiter(s) Oral daily  pantoprazole  Injectable 40 milliGRAM(s) IV Push daily  polyethylene glycol 3350 17 Gram(s) Oral daily  senna 2 Tablet(s) Oral at bedtime  sevelamer carbonate Powder 800 milliGRAM(s) Oral three times a day with meals  thiamine 100 milliGRAM(s) Enteral Tube daily    PRN Inpatient Medications  acetaminophen    Suspension .. 650 milliGRAM(s) Oral every 6 hours PRN  simethicone 80 milliGRAM(s) Chew daily PRN  sodium chloride 0.9% lock flush 10 milliLiter(s) IV Push every 1 hour PRN      REVIEW OF SYSTEMS  --------------------------------------------------------------------------------  Gen: No chills  Respiratory: No dyspnea, cough  CV: No chest pain  GI: No abdominal pain, diarrhea,  nausea, vomiting  : No dysuria, hematuria  MSK:  + edema  Neuro: No dizziness/lightheadedness      All other systems were reviewed and are negative, except as noted.    VITALS/PHYSICAL EXAM  --------------------------------------------------------------------------------  T(C): 36.6 (01-07-23 @ 08:00), Max: 36.8 (01-06-23 @ 20:45)  HR: 109 (01-07-23 @ 11:00) (104 - 119)  BP: --  RR: 18 (01-07-23 @ 11:00) (10 - 40)  SpO2: 100% (01-07-23 @ 11:00) (79% - 100%)  Wt(kg): --        01-06-23 @ 07:01  -  01-07-23 @ 07:00  --------------------------------------------------------  IN: 2260 mL / OUT: 1925 mL / NET: 335 mL    01-07-23 @ 07:01  -  01-07-23 @ 11:18  --------------------------------------------------------  IN: 410 mL / OUT: 40 mL / NET: 370 mL      Physical Exam:  	Gen: NAD  	HEENT: Anicteric  	Pulm: +TC   	CV: S1S2+  	Abd: Soft, +BS      	Ext: trace LE edema B/L  	Neuro: Awake  	Skin: Warm and dry  	Vascular access: RIJ non tunneled catheter    LABS/STUDIES  --------------------------------------------------------------------------------              9.1    13.63 >-----------<  138      [01-07-23 @ 00:49]              29.3     137  |  97  |  45  ----------------------------<  156      [01-07-23 @ 00:48]  4.3   |  26  |  2.71        Ca     9.6     [01-07-23 @ 00:48]      Mg     2.6     [01-07-23 @ 00:48]      Phos  3.4     [01-07-23 @ 00:48]    TPro  7.7  /  Alb  3.6  /  TBili  1.3  /  DBili  x   /  AST  26  /  ALT  14  /  AlkPhos  84  [01-07-23 @ 00:48]          Creatinine Trend:  SCr 2.71 [01-07 @ 00:48]  SCr 3.80 [01-06 @ 00:38]  SCr 2.77 [01-05 @ 00:11]  SCr 3.71 [01-04 @ 00:27]  SCr 3.27 [01-03 @ 08:36]          HBsAg Nonreact      [01-04-23 @ 10:02]     [facility] Division of Kidney Diseases & Hypertension  FOLLOW UP NOTE  [phone]--------------------------------------------------------------------------------  Chief Complaint:Acute pancreatitis without infection or necrosis      HPI: 61 y/o male with PMH of CABG, DM, HTN, HLD presenting as transfer from [place] for c/f STEMI. Course c/b gallstone pancreatitis leading to ARDS and shock & cardiac arrest now on [facility] ECMO. Had significant troponin elevation and his LVEF down to 8%. Pt was deemed to be too unstable to have an angiogram and potential PCI due to his co-morbidities & a new subdural bleed. Pt being seen for ERIC. SCr on arrival was 2.15; trended down to hector 1.6 on 11/25 and eventually increased to 3.95 11/28. Pt received IV diuretics as per CTU. Pt initiated on CRRT on 12/5/22 to optimize volume status and electrolytes.  Pt underwent decannulation of ECMO on 12/8/22. Pt was restarted on 12/9/22 for volume overload. Pt underwent mitral clip OR (12/16/22). s/p trach on 12/16       24 hour events/subjective: Patient seen & examined. Labs & vitals reviewed. Remains on TC with FiO2 40% since 72 hrs. Bp stable, off pressors. Last HD session 1/6 with 1.7L UF. During the last 30 mins of HD bp drop 90's map 55 required cut back fluid removal. CXR remains with b/l pulm vasc congestion. UO in the last 24 hours 220cc. Net positive 300cc in 24 hrs            PAST HISTORY  --------------------------------------------------------------------------------  No significant changes to PMH, PSH, FHx, SHx, unless otherwise noted    ALLERGIES & MEDICATIONS  --------------------------------------------------------------------------------  Allergies    No Known Allergies    Intolerances      Standing Inpatient Medications  aMIOdarone    Tablet 200 milliGRAM(s) Oral daily  aspirin  chewable 81 milliGRAM(s) Oral daily  atorvastatin 80 milliGRAM(s) Oral at bedtime  caspofungin IVPB 50 milliGRAM(s) IV Intermittent every 24 hours  caspofungin IVPB      chlorhexidine 0.12% Liquid 15 milliLiter(s) Oral Mucosa every 12 hours  chlorhexidine 2% Cloths 1 Application(s) Topical <User Schedule>  epoetin teena-epbx (RETACRIT) Injectable 5000 Unit(s) IV Push <User Schedule>  heparin   Injectable 5000 Unit(s) SubCutaneous every 8 hours  insulin lispro (ADMELOG) corrective regimen sliding scale   SubCutaneous every 4 hours  insulin NPH human recombinant 7 Unit(s) SubCutaneous every 6 hours  metoclopramide Injectable 5 milliGRAM(s) IV Push every 8 hours  midodrine 10 milliGRAM(s) Oral every 8 hours  multivitamin/minerals/iron Oral Solution (CENTRUM) 15 milliLiter(s) Oral daily  pantoprazole  Injectable 40 milliGRAM(s) IV Push daily  polyethylene glycol 3350 17 Gram(s) Oral daily  senna 2 Tablet(s) Oral at bedtime  sevelamer carbonate Powder 800 milliGRAM(s) Oral three times a day with meals  thiamine 100 milliGRAM(s) Enteral Tube daily    PRN Inpatient Medications  acetaminophen    Suspension .. 650 milliGRAM(s) Oral every 6 hours PRN  simethicone 80 milliGRAM(s) Chew daily PRN  sodium chloride 0.9% lock flush 10 milliLiter(s) IV Push every 1 hour PRN      REVIEW OF SYSTEMS  --------------------------------------------------------------------------------  Gen: No chills  Respiratory: No dyspnea, cough  CV: No chest pain  GI: No abdominal pain, diarrhea,  nausea, vomiting  : No dysuria, hematuria  MSK:  + edema  Neuro: No dizziness/lightheadedness      All other systems were reviewed and are negative, except as noted.    VITALS/PHYSICAL EXAM  --------------------------------------------------------------------------------  T(C): 36.6 (01-07-23 @ 08:00), Max: 36.8 (01-06-23 @ 20:45)  HR: 109 (01-07-23 @ 11:00) (104 - 119)  BP: --  RR: 18 (01-07-23 @ 11:00) (10 - 40)  SpO2: 100% (01-07-23 @ 11:00) (79% - 100%)  Wt(kg): --        01-06-23 @ 07:01  -  01-07-23 @ 07:00  --------------------------------------------------------  IN: 2260 mL / OUT: 1925 mL / NET: 335 mL    01-07-23 @ 07:01  -  01-07-23 @ 11:18  --------------------------------------------------------  IN: 410 mL / OUT: 40 mL / NET: 370 mL      Physical Exam:  	Gen: NAD  	HEENT: Anicteric  	Pulm: +TC   	CV: S1S2+  	Abd: Soft, +BS      	Ext: trace LE edema B/L  	Neuro: Awake  	Skin: Warm and dry  	Vascular access: RIJ non tunneled catheter    LABS/STUDIES  --------------------------------------------------------------------------------              9.1    13.63 >-----------<  138      [01-07-23 @ 00:49]              29.3     137  |  97  |  45  ----------------------------<  156      [01-07-23 @ 00:48]  4.3   |  26  |  2.71        Ca     9.6     [01-07-23 @ 00:48]      Mg     2.6     [01-07-23 @ 00:48]      Phos  3.4     [01-07-23 @ 00:48]    TPro  7.7  /  Alb  3.6  /  TBili  1.3  /  DBili  x   /  AST  26  /  ALT  14  /  AlkPhos  84  [01-07-23 @ 00:48]          Creatinine Trend:  SCr 2.71 [01-07 @ 00:48]  SCr 3.80 [01-06 @ 00:38]  SCr 2.77 [01-05 @ 00:11]  SCr 3.71 [01-04 @ 00:27]  SCr 3.27 [01-03 @ 08:36]          HBsAg Nonreact      [01-04-23 @ 10:02]

## 2023-01-08 LAB
ALBUMIN SERPL ELPH-MCNC: 3.9 G/DL — SIGNIFICANT CHANGE UP (ref 3.3–5)
ALP SERPL-CCNC: 86 U/L — SIGNIFICANT CHANGE UP (ref 40–120)
ALT FLD-CCNC: 16 U/L — SIGNIFICANT CHANGE UP (ref 10–45)
ANION GAP SERPL CALC-SCNC: 15 MMOL/L — SIGNIFICANT CHANGE UP (ref 5–17)
AST SERPL-CCNC: 28 U/L — SIGNIFICANT CHANGE UP (ref 10–40)
BILIRUB SERPL-MCNC: 1.4 MG/DL — HIGH (ref 0.2–1.2)
BUN SERPL-MCNC: 28 MG/DL — HIGH (ref 7–23)
CALCIUM SERPL-MCNC: 9.2 MG/DL — SIGNIFICANT CHANGE UP (ref 8.4–10.5)
CHLORIDE SERPL-SCNC: 96 MMOL/L — SIGNIFICANT CHANGE UP (ref 96–108)
CO2 SERPL-SCNC: 26 MMOL/L — SIGNIFICANT CHANGE UP (ref 22–31)
CREAT SERPL-MCNC: 1.95 MG/DL — HIGH (ref 0.5–1.3)
EGFR: 38 ML/MIN/1.73M2 — LOW
GAS PNL BLDA: SIGNIFICANT CHANGE UP
GLUCOSE BLDC GLUCOMTR-MCNC: 106 MG/DL — HIGH (ref 70–99)
GLUCOSE BLDC GLUCOMTR-MCNC: 125 MG/DL — HIGH (ref 70–99)
GLUCOSE BLDC GLUCOMTR-MCNC: 154 MG/DL — HIGH (ref 70–99)
GLUCOSE SERPL-MCNC: 109 MG/DL — HIGH (ref 70–99)
HCT VFR BLD CALC: 30 % — LOW (ref 39–50)
HGB BLD-MCNC: 9.3 G/DL — LOW (ref 13–17)
MAGNESIUM SERPL-MCNC: 2.4 MG/DL — SIGNIFICANT CHANGE UP (ref 1.6–2.6)
MCHC RBC-ENTMCNC: 29.8 PG — SIGNIFICANT CHANGE UP (ref 27–34)
MCHC RBC-ENTMCNC: 31 GM/DL — LOW (ref 32–36)
MCV RBC AUTO: 96.2 FL — SIGNIFICANT CHANGE UP (ref 80–100)
NRBC # BLD: 0 /100 WBCS — SIGNIFICANT CHANGE UP (ref 0–0)
PHOSPHATE SERPL-MCNC: 2.7 MG/DL — SIGNIFICANT CHANGE UP (ref 2.5–4.5)
PLATELET # BLD AUTO: 140 K/UL — LOW (ref 150–400)
POTASSIUM SERPL-MCNC: 3.7 MMOL/L — SIGNIFICANT CHANGE UP (ref 3.5–5.3)
POTASSIUM SERPL-SCNC: 3.7 MMOL/L — SIGNIFICANT CHANGE UP (ref 3.5–5.3)
PROT SERPL-MCNC: 8 G/DL — SIGNIFICANT CHANGE UP (ref 6–8.3)
RBC # BLD: 3.12 M/UL — LOW (ref 4.2–5.8)
RBC # FLD: 18.6 % — HIGH (ref 10.3–14.5)
SODIUM SERPL-SCNC: 137 MMOL/L — SIGNIFICANT CHANGE UP (ref 135–145)
WBC # BLD: 13.57 K/UL — HIGH (ref 3.8–10.5)
WBC # FLD AUTO: 13.57 K/UL — HIGH (ref 3.8–10.5)

## 2023-01-08 PROCEDURE — 99232 SBSQ HOSP IP/OBS MODERATE 35: CPT

## 2023-01-08 PROCEDURE — 71045 X-RAY EXAM CHEST 1 VIEW: CPT | Mod: 26

## 2023-01-08 PROCEDURE — 99291 CRITICAL CARE FIRST HOUR: CPT

## 2023-01-08 PROCEDURE — 99233 SBSQ HOSP IP/OBS HIGH 50: CPT | Mod: GC

## 2023-01-08 RX ORDER — METOCLOPRAMIDE HCL 10 MG
5 TABLET ORAL EVERY 8 HOURS
Refills: 0 | Status: COMPLETED | OUTPATIENT
Start: 2023-01-08 | End: 2023-01-10

## 2023-01-08 RX ORDER — FUROSEMIDE 40 MG
100 TABLET ORAL ONCE
Refills: 0 | Status: COMPLETED | OUTPATIENT
Start: 2023-01-08 | End: 2023-01-08

## 2023-01-08 RX ADMIN — SIMETHICONE 80 MILLIGRAM(S): 80 TABLET, CHEWABLE ORAL at 21:19

## 2023-01-08 RX ADMIN — AMIODARONE HYDROCHLORIDE 200 MILLIGRAM(S): 400 TABLET ORAL at 05:03

## 2023-01-08 RX ADMIN — Medication 100 MILLIGRAM(S): at 11:54

## 2023-01-08 RX ADMIN — POLYETHYLENE GLYCOL 3350 17 GRAM(S): 17 POWDER, FOR SOLUTION ORAL at 11:54

## 2023-01-08 RX ADMIN — CHLORHEXIDINE GLUCONATE 1 APPLICATION(S): 213 SOLUTION TOPICAL at 22:06

## 2023-01-08 RX ADMIN — Medication 3: at 11:55

## 2023-01-08 RX ADMIN — MIDODRINE HYDROCHLORIDE 10 MILLIGRAM(S): 2.5 TABLET ORAL at 21:20

## 2023-01-08 RX ADMIN — SEVELAMER CARBONATE 800 MILLIGRAM(S): 2400 POWDER, FOR SUSPENSION ORAL at 07:57

## 2023-01-08 RX ADMIN — HUMAN INSULIN 7 UNIT(S): 100 INJECTION, SUSPENSION SUBCUTANEOUS at 06:27

## 2023-01-08 RX ADMIN — HEPARIN SODIUM 5000 UNIT(S): 5000 INJECTION INTRAVENOUS; SUBCUTANEOUS at 17:07

## 2023-01-08 RX ADMIN — Medication 15 MILLILITER(S): at 11:55

## 2023-01-08 RX ADMIN — Medication 120 MILLIGRAM(S): at 11:54

## 2023-01-08 RX ADMIN — Medication 12.5 MILLIGRAM(S): at 05:03

## 2023-01-08 RX ADMIN — Medication 12.5 MILLIGRAM(S): at 17:07

## 2023-01-08 RX ADMIN — HEPARIN SODIUM 5000 UNIT(S): 5000 INJECTION INTRAVENOUS; SUBCUTANEOUS at 00:21

## 2023-01-08 RX ADMIN — HUMAN INSULIN 7 UNIT(S): 100 INJECTION, SUSPENSION SUBCUTANEOUS at 00:21

## 2023-01-08 RX ADMIN — SEVELAMER CARBONATE 800 MILLIGRAM(S): 2400 POWDER, FOR SUSPENSION ORAL at 17:07

## 2023-01-08 RX ADMIN — HEPARIN SODIUM 5000 UNIT(S): 5000 INJECTION INTRAVENOUS; SUBCUTANEOUS at 09:19

## 2023-01-08 RX ADMIN — CASPOFUNGIN ACETATE 260 MILLIGRAM(S): 7 INJECTION, POWDER, LYOPHILIZED, FOR SOLUTION INTRAVENOUS at 19:29

## 2023-01-08 RX ADMIN — HUMAN INSULIN 7 UNIT(S): 100 INJECTION, SUSPENSION SUBCUTANEOUS at 11:56

## 2023-01-08 RX ADMIN — Medication 81 MILLIGRAM(S): at 12:50

## 2023-01-08 RX ADMIN — PANTOPRAZOLE SODIUM 40 MILLIGRAM(S): 20 TABLET, DELAYED RELEASE ORAL at 11:55

## 2023-01-08 RX ADMIN — SEVELAMER CARBONATE 800 MILLIGRAM(S): 2400 POWDER, FOR SUSPENSION ORAL at 11:56

## 2023-01-08 RX ADMIN — ATORVASTATIN CALCIUM 80 MILLIGRAM(S): 80 TABLET, FILM COATED ORAL at 21:20

## 2023-01-08 RX ADMIN — Medication 5 MILLIGRAM(S): at 21:20

## 2023-01-08 RX ADMIN — Medication 5 MILLIGRAM(S): at 12:50

## 2023-01-08 RX ADMIN — CHLORHEXIDINE GLUCONATE 1 APPLICATION(S): 213 SOLUTION TOPICAL at 05:41

## 2023-01-08 RX ADMIN — HUMAN INSULIN 7 UNIT(S): 100 INJECTION, SUSPENSION SUBCUTANEOUS at 17:21

## 2023-01-08 NOTE — PROGRESS NOTE ADULT - SUBJECTIVE AND OBJECTIVE BOX
[facility] Division of Kidney Diseases & Hypertension  FOLLOW UP NOTE  [phone]--------------------------------------------------------------------------------  Chief Complaint:Acute pancreatitis without infection or necrosis        HPI: 61 y/o male with PMH of CABG, DM, HTN, HLD presenting as transfer from [place] for c/f STEMI. Course c/b gallstone pancreatitis leading to ARDS and shock & cardiac arrest now on [facility]O. Had significant troponin elevation and his LVEF down to 8%. Pt was deemed to be too unstable to have an angiogram and potential PCI due to his co-morbidities & a new subdural bleed. Pt being seen for ERIC. SCr on arrival was 2.15; trended down to hector 1.6 on 11/25 and eventually increased to 3.95 11/28. Pt received IV diuretics as per CTU. Pt initiated on CRRT on 12/5/22 to optimize volume status and electrolytes.  Pt underwent decannulation of ECMO on 12/8/22. Pt was restarted on 12/9/22 for volume overload. Pt underwent mitral clip OR (12/16/22). s/p trach on 12/16       24 hour events/subjective: Patient seen & examined. Labs & vitals reviewed. Remains on TC with FiO2 40% since 1/4. Bp stable, off pressors. Last HD session 1/7 with 2L UF. CXR with improved b/l pulm vasc congestion. Net even in 24 hrs. Bladder scan with 40 cc            PAST HISTORY  --------------------------------------------------------------------------------  No significant changes to PMH, PSH, FHx, SHx, unless otherwise noted    ALLERGIES & MEDICATIONS  --------------------------------------------------------------------------------  Allergies    No Known Allergies    Intolerances      Standing Inpatient Medications  aMIOdarone    Tablet 200 milliGRAM(s) Oral daily  aspirin  chewable 81 milliGRAM(s) Oral daily  atorvastatin 80 milliGRAM(s) Oral at bedtime  caspofungin IVPB 50 milliGRAM(s) IV Intermittent every 24 hours  caspofungin IVPB      chlorhexidine 2% Cloths 1 Application(s) Topical <User Schedule>  epoetin teena-epbx (RETACRIT) Injectable 5000 Unit(s) IV Push <User Schedule>  heparin   Injectable 5000 Unit(s) SubCutaneous every 8 hours  insulin lispro (ADMELOG) corrective regimen sliding scale   SubCutaneous every 6 hours  insulin NPH human recombinant 7 Unit(s) SubCutaneous every 6 hours  metoclopramide Injectable 5 milliGRAM(s) IV Push every 8 hours  metoprolol tartrate 12.5 milliGRAM(s) Oral every 12 hours  midodrine 10 milliGRAM(s) Oral every 8 hours  multivitamin/minerals/iron Oral Solution (CENTRUM) 15 milliLiter(s) Oral daily  pantoprazole  Injectable 40 milliGRAM(s) IV Push daily  polyethylene glycol 3350 17 Gram(s) Oral daily  senna 2 Tablet(s) Oral at bedtime  sevelamer carbonate Powder 800 milliGRAM(s) Oral three times a day with meals  thiamine 100 milliGRAM(s) Enteral Tube daily    PRN Inpatient Medications  acetaminophen    Suspension .. 650 milliGRAM(s) Oral every 6 hours PRN  simethicone 80 milliGRAM(s) Chew daily PRN  sodium chloride 0.9% lock flush 10 milliLiter(s) IV Push every 1 hour PRN      REVIEW OF SYSTEMS  --------------------------------------------------------------------------------  Gen: No chills  Respiratory: No dyspnea, cough  CV: No chest pain  GI: No abdominal pain, diarrhea,  nausea, vomiting  MSK:  no edema  Neuro: No dizziness/lightheadedness      All other systems were reviewed and are negative, except as noted.    VITALS/PHYSICAL EXAM  --------------------------------------------------------------------------------  T(C): 36.1 (01-08-23 @ 08:00), Max: 36.7 (01-07-23 @ 20:00)  HR: 90 (01-08-23 @ 09:00) (88 - 110)  BP: --  RR: 19 (01-08-23 @ 09:00) (3 - 37)  SpO2: 100% (01-08-23 @ 09:00) (98% - 100%)  Wt(kg): --        01-07-23 @ 07:01  -  01-08-23 @ 07:00  --------------------------------------------------------  IN: 1965 mL / OUT: 2040 mL / NET: -75 mL    01-08-23 @ 07:01  -  01-08-23 @ 10:01  --------------------------------------------------------  IN: 225 mL / OUT: 0 mL / NET: 225 mL      Physical exam:   Gen: NAD  	HEENT: Anicteric  	Pulm: +TC   	CV: S1S2+  	Abd: Soft, +BS      	Ext: trace LE edema B/L  	Neuro: Awake  	Skin: Warm and dry  	Vascular access: RIJ non tunneled catheter    LABS/STUDIES  --------------------------------------------------------------------------------              9.3    13.57 >-----------<  140      [01-08-23 @ 00:22]              30.0     137  |  96  |  28  ----------------------------<  109      [01-08-23 @ 00:21]  3.7   |  26  |  1.95        Ca     9.2     [01-08-23 @ 00:21]      Mg     2.4     [01-08-23 @ 00:21]      Phos  2.7     [01-08-23 @ 00:21]    TPro  8.0  /  Alb  3.9  /  TBili  1.4  /  DBili  x   /  AST  28  /  ALT  16  /  AlkPhos  86  [01-08-23 @ 00:21]          Creatinine Trend:  SCr 1.95 [01-08 @ 00:21]  SCr 2.71 [01-07 @ 00:48]  SCr 3.80 [01-06 @ 00:38]  SCr 2.77 [01-05 @ 00:11]  SCr 3.71 [01-04 @ 00:27]          HBsAg Nonreact      [01-04-23 @ 10:02]     [facility] Division of Kidney Diseases & Hypertension  FOLLOW UP NOTE  [phone]--------------------------------------------------------------------------------  Chief Complaint:Acute pancreatitis without infection or necrosis        HPI: 61 y/o male with PMH of CABG, DM, HTN, HLD presenting as transfer from [place] for c/f STEMI. Course c/b gallstone pancreatitis leading to ARDS and shock & cardiac arrest now on [facility]. Had significant troponin elevation and his LVEF down to 8%. Pt was deemed to be too unstable to have an angiogram and potential PCI due to his co-morbidities & a new subdural bleed. Pt being seen for ERIC. SCr on arrival was 2.15; trended down to hector 1.6 on 11/25 and eventually increased to 3.95 11/28. Pt received IV diuretics as per CTU. Pt initiated on CRRT on 12/5/22 to optimize volume status and electrolytes.  Pt underwent decannulation of ECMO on 12/8/22. Pt was restarted on 12/9/22 for volume overload. Pt underwent mitral clip OR (12/16/22). s/p trach on 12/16       24 hour events/subjective: Patient seen & examined. Labs & vitals reviewed. Remains on TC with FiO2 40% since 1/4. Bp stable, off pressors. Last HD session 1/7 with 2L UF. CXR with improved b/l pulm vasc congestion. Net even in 24 hrs. Bladder scan with 40 cc            PAST HISTORY  --------------------------------------------------------------------------------  No significant changes to PMH, PSH, FHx, SHx, unless otherwise noted    ALLERGIES & MEDICATIONS  --------------------------------------------------------------------------------  Allergies    No Known Allergies    Intolerances      Standing Inpatient Medications  aMIOdarone    Tablet 200 milliGRAM(s) Oral daily  aspirin  chewable 81 milliGRAM(s) Oral daily  atorvastatin 80 milliGRAM(s) Oral at bedtime  caspofungin IVPB 50 milliGRAM(s) IV Intermittent every 24 hours  caspofungin IVPB      chlorhexidine 2% Cloths 1 Application(s) Topical <User Schedule>  epoetin teena-epbx (RETACRIT) Injectable 5000 Unit(s) IV Push <User Schedule>  heparin   Injectable 5000 Unit(s) SubCutaneous every 8 hours  insulin lispro (ADMELOG) corrective regimen sliding scale   SubCutaneous every 6 hours  insulin NPH human recombinant 7 Unit(s) SubCutaneous every 6 hours  metoclopramide Injectable 5 milliGRAM(s) IV Push every 8 hours  metoprolol tartrate 12.5 milliGRAM(s) Oral every 12 hours  midodrine 10 milliGRAM(s) Oral every 8 hours  multivitamin/minerals/iron Oral Solution (CENTRUM) 15 milliLiter(s) Oral daily  pantoprazole  Injectable 40 milliGRAM(s) IV Push daily  polyethylene glycol 3350 17 Gram(s) Oral daily  senna 2 Tablet(s) Oral at bedtime  sevelamer carbonate Powder 800 milliGRAM(s) Oral three times a day with meals  thiamine 100 milliGRAM(s) Enteral Tube daily    PRN Inpatient Medications  acetaminophen    Suspension .. 650 milliGRAM(s) Oral every 6 hours PRN  simethicone 80 milliGRAM(s) Chew daily PRN  sodium chloride 0.9% lock flush 10 milliLiter(s) IV Push every 1 hour PRN      REVIEW OF SYSTEMS  --------------------------------------------------------------------------------  Gen: No chills  Respiratory: No dyspnea, cough  CV: No chest pain  GI: No abdominal pain, diarrhea,  nausea, vomiting  MSK:  no edema  Neuro: No dizziness/lightheadedness      All other systems were reviewed and are negative, except as noted.    VITALS/PHYSICAL EXAM  --------------------------------------------------------------------------------  T(C): 36.1 (01-08-23 @ 08:00), Max: 36.7 (01-07-23 @ 20:00)  HR: 90 (01-08-23 @ 09:00) (88 - 110)  BP: --  RR: 19 (01-08-23 @ 09:00) (3 - 37)  SpO2: 100% (01-08-23 @ 09:00) (98% - 100%)  Wt(kg): --        01-07-23 @ 07:01  -  01-08-23 @ 07:00  --------------------------------------------------------  IN: 1965 mL / OUT: 2040 mL / NET: -75 mL    01-08-23 @ 07:01  -  01-08-23 @ 10:01  --------------------------------------------------------  IN: 225 mL / OUT: 0 mL / NET: 225 mL      Physical exam:   Gen: NAD  	HEENT: Anicteric  	Pulm: +TC   	CV: S1S2+  	Abd: Soft, +BS      	Ext: trace LE edema B/L  	Neuro: Awake  	Skin: Warm and dry  	Vascular access: RIJ non tunneled catheter    LABS/STUDIES  --------------------------------------------------------------------------------              9.3    13.57 >-----------<  140      [01-08-23 @ 00:22]              30.0     137  |  96  |  28  ----------------------------<  109      [01-08-23 @ 00:21]  3.7   |  26  |  1.95        Ca     9.2     [01-08-23 @ 00:21]      Mg     2.4     [01-08-23 @ 00:21]      Phos  2.7     [01-08-23 @ 00:21]    TPro  8.0  /  Alb  3.9  /  TBili  1.4  /  DBili  x   /  AST  28  /  ALT  16  /  AlkPhos  86  [01-08-23 @ 00:21]          Creatinine Trend:  SCr 1.95 [01-08 @ 00:21]  SCr 2.71 [01-07 @ 00:48]  SCr 3.80 [01-06 @ 00:38]  SCr 2.77 [01-05 @ 00:11]  SCr 3.71 [01-04 @ 00:27]          HBsAg Nonreact      [01-04-23 @ 10:02]     [facility] Division of Kidney Diseases & Hypertension  FOLLOW UP NOTE  [phone]--------------------------------------------------------------------------------  Chief Complaint:Acute pancreatitis without infection or necrosis        HPI: 63 y/o male with PMH of CABG, DM, HTN, HLD presenting as transfer from [place] for c/f STEMI. Course c/b gallstone pancreatitis leading to ARDS and shock & cardiac arrest now on [facility] ECM[facility]. Had significant troponin elevation and his LVEF down to 8%. Pt was deemed to be too unstable to have an angiogram and potential PCI due to his co-morbidities & a new subdural bleed. Pt being seen for ERIC. SCr on arrival was 2.15; trended down to hector 1.6 on 11/25 and eventually increased to 3.95 11/28. Pt received IV diuretics as per CTU. Pt initiated on CRRT on 12/5/22 to optimize volume status and electrolytes.  Pt underwent decannulation of ECMO on 12/8/22. Pt was restarted on 12/9/22 for volume overload. Pt underwent mitral clip OR (12/16/22). s/p trach on 12/16       24 hour events/subjective: Patient seen & examined. Labs & vitals reviewed. Remains on TC with FiO2 40% since 1/4. Bp stable, off pressors. Last HD session 1/7 with 2L UF. CXR with improved b/l pulm vasc congestion. Net even in 24 hrs. Bladder scan with 40 cc            PAST HISTORY  --------------------------------------------------------------------------------  No significant changes to PMH, PSH, FHx, SHx, unless otherwise noted    ALLERGIES & MEDICATIONS  --------------------------------------------------------------------------------  Allergies    No Known Allergies    Intolerances      Standing Inpatient Medications  aMIOdarone    Tablet 200 milliGRAM(s) Oral daily  aspirin  chewable 81 milliGRAM(s) Oral daily  atorvastatin 80 milliGRAM(s) Oral at bedtime  caspofungin IVPB 50 milliGRAM(s) IV Intermittent every 24 hours  caspofungin IVPB      chlorhexidine 2% Cloths 1 Application(s) Topical <User Schedule>  epoetin teena-epbx (RETACRIT) Injectable 5000 Unit(s) IV Push <User Schedule>  heparin   Injectable 5000 Unit(s) SubCutaneous every 8 hours  insulin lispro (ADMELOG) corrective regimen sliding scale   SubCutaneous every 6 hours  insulin NPH human recombinant 7 Unit(s) SubCutaneous every 6 hours  metoclopramide Injectable 5 milliGRAM(s) IV Push every 8 hours  metoprolol tartrate 12.5 milliGRAM(s) Oral every 12 hours  midodrine 10 milliGRAM(s) Oral every 8 hours  multivitamin/minerals/iron Oral Solution (CENTRUM) 15 milliLiter(s) Oral daily  pantoprazole  Injectable 40 milliGRAM(s) IV Push daily  polyethylene glycol 3350 17 Gram(s) Oral daily  senna 2 Tablet(s) Oral at bedtime  sevelamer carbonate Powder 800 milliGRAM(s) Oral three times a day with meals  thiamine 100 milliGRAM(s) Enteral Tube daily    PRN Inpatient Medications  acetaminophen    Suspension .. 650 milliGRAM(s) Oral every 6 hours PRN  simethicone 80 milliGRAM(s) Chew daily PRN  sodium chloride 0.9% lock flush 10 milliLiter(s) IV Push every 1 hour PRN      REVIEW OF SYSTEMS  --------------------------------------------------------------------------------  Gen: No chills  Respiratory: No dyspnea, cough  CV: No chest pain  GI: No abdominal pain, diarrhea,  nausea, vomiting  MSK:  no edema  Neuro: No dizziness/lightheadedness      All other systems were reviewed and are negative, except as noted.    VITALS/PHYSICAL EXAM  --------------------------------------------------------------------------------  T(C): 36.1 (01-08-23 @ 08:00), Max: 36.7 (01-07-23 @ 20:00)  HR: 90 (01-08-23 @ 09:00) (88 - 110)  BP: --  RR: 19 (01-08-23 @ 09:00) (3 - 37)  SpO2: 100% (01-08-23 @ 09:00) (98% - 100%)  Wt(kg): --        01-07-23 @ 07:01  -  01-08-23 @ 07:00  --------------------------------------------------------  IN: 1965 mL / OUT: 2040 mL / NET: -75 mL    01-08-23 @ 07:01  -  01-08-23 @ 10:01  --------------------------------------------------------  IN: 225 mL / OUT: 0 mL / NET: 225 mL      Physical exam:   Gen: NAD  	HEENT: Anicteric  	Pulm: +TC   	CV: S1S2+  	Abd: Soft, +BS      	Ext: trace LE edema B/L  	Neuro: Awake  	Skin: Warm and dry  	Vascular access: RIJ non tunneled catheter    LABS/STUDIES  --------------------------------------------------------------------------------              9.3    13.57 >-----------<  140      [01-08-23 @ 00:22]              30.0     137  |  96  |  28  ----------------------------<  109      [01-08-23 @ 00:21]  3.7   |  26  |  1.95        Ca     9.2     [01-08-23 @ 00:21]      Mg     2.4     [01-08-23 @ 00:21]      Phos  2.7     [01-08-23 @ 00:21]    TPro  8.0  /  Alb  3.9  /  TBili  1.4  /  DBili  x   /  AST  28  /  ALT  16  /  AlkPhos  86  [01-08-23 @ 00:21]          Creatinine Trend:  SCr 1.95 [01-08 @ 00:21]  SCr 2.71 [01-07 @ 00:48]  SCr 3.80 [01-06 @ 00:38]  SCr 2.77 [01-05 @ 00:11]  SCr 3.71 [01-04 @ 00:27]          HBsAg Nonreact      [01-04-23 @ 10:02]

## 2023-01-08 NOTE — PROGRESS NOTE ADULT - SUBJECTIVE AND OBJECTIVE BOX
infectious diseases progress note:    Patient is a 62y old  Male who presents with a chief complaint of Acute cholecystitis, gallstone pancreatitis (08 Jan 2023 07:25)        Acute pancreatitis without infection or necrosis               Allergies    No Known Allergies    Intolerances        ANTIBIOTICS/RELEVANT:  antimicrobials  caspofungin IVPB 50 milliGRAM(s) IV Intermittent every 24 hours  caspofungin IVPB        immunologic:  epoetin teena-epbx (RETACRIT) Injectable 5000 Unit(s) IV Push <User Schedule>    OTHER:  acetaminophen    Suspension .. 650 milliGRAM(s) Oral every 6 hours PRN  aMIOdarone    Tablet 200 milliGRAM(s) Oral daily  aspirin  chewable 81 milliGRAM(s) Oral daily  atorvastatin 80 milliGRAM(s) Oral at bedtime  chlorhexidine 2% Cloths 1 Application(s) Topical <User Schedule>  heparin   Injectable 5000 Unit(s) SubCutaneous every 8 hours  insulin lispro (ADMELOG) corrective regimen sliding scale   SubCutaneous every 6 hours  insulin NPH human recombinant 7 Unit(s) SubCutaneous every 6 hours  metoclopramide Injectable 5 milliGRAM(s) IV Push every 8 hours  metoprolol tartrate 12.5 milliGRAM(s) Oral every 12 hours  midodrine 10 milliGRAM(s) Oral every 8 hours  multivitamin/minerals/iron Oral Solution (CENTRUM) 15 milliLiter(s) Oral daily  pantoprazole  Injectable 40 milliGRAM(s) IV Push daily  polyethylene glycol 3350 17 Gram(s) Oral daily  senna 2 Tablet(s) Oral at bedtime  sevelamer carbonate Powder 800 milliGRAM(s) Oral three times a day with meals  simethicone 80 milliGRAM(s) Chew daily PRN  sodium chloride 0.9% lock flush 10 milliLiter(s) IV Push every 1 hour PRN  thiamine 100 milliGRAM(s) Enteral Tube daily      Objective:  Vital Signs Last 24 Hrs  T(C): 36.1 (08 Jan 2023 08:00), Max: 36.7 (07 Jan 2023 20:00)  T(F): 97 (08 Jan 2023 08:00), Max: 98 (07 Jan 2023 20:00)  HR: 90 (08 Jan 2023 09:00) (88 - 110)  BP: --  BP(mean): --  RR: 19 (08 Jan 2023 09:00) (3 - 37)  SpO2: 100% (08 Jan 2023 09:00) (98% - 100%)    Parameters below as of 08 Jan 2023 08:00  Patient On (Oxygen Delivery Method): tracheostomy collar    O2 Concentration (%): 40     Eyes:INOCENCIO, EOMI  Ear/Nose/Throat: no oral lesion, no sinus tenderness on percussion	  Neck:no JVD, no lymphadenopathy, supple  Respiratory: CTA lore  Cardiovascular: S1S2 RRR, no murmurs  Gastrointestinal:soft, (+) BS, no HSM  Extremities:no e/e/c        LABS:                        9.3    13.57 )-----------( 140      ( 08 Jan 2023 00:22 )             30.0     01-08    137  |  96  |  28<H>  ----------------------------<  109<H>  3.7   |  26  |  1.95<H>    Ca    9.2      08 Jan 2023 00:21  Phos  2.7     01-08  Mg     2.4     01-08    TPro  8.0  /  Alb  3.9  /  TBili  1.4<H>  /  DBili  x   /  AST  28  /  ALT  16  /  AlkPhos  86  01-08            MICROBIOLOGY:    RECENT CULTURES:  01-03 @ 21:09 .Blood Blood-Peripheral                No growth to date.    01-03 @ 20:45 .Blood Blood-Peripheral                No growth to date.    01-02 @ 01:01 .Blood Blood-Peripheral   PCR    Growth in anaerobic bottle: Gram Positive Cocci in Clusters    Blood Culture PCR  Blood Culture PCR     Growth in anaerobic bottle: Staphylococcus epidermidis  Coag Negative Staphylococcus  Single set isolate, possible contaminant. Contact  Microbiology if susceptibility testing clinically  indicated.  ***Blood Panel PCR results on this specimen are available  approximately 3 hours after the Gram stain result.***  Gram stain, PCR, and/or culture results may not always  correspond due to difference in methodologies.  ************************************************************  This PCR assay was performed by multiplex PCR. This  Assay tests for 66 bacterial and resistance gene targets.  Please refer to the [facility] [employer] test directory  at [email or web address] for details.    01-01 @ 17:45 .Sputum Sputum       Few polymorphonuclear leukocytes per low power field  Few Squamous epithelial cells per low power field  Rare Gram positive cocci in pairs per oil power field           Normal Respiratory Christy present          RESPIRATORY CULTURES:              RADIOLOGY & ADDITIONAL STUDIES:        Pager [phone]  After 5 pm/weekends or if no response :[phone] infectious diseases progress note:    Patient is a 62y old  Male who presents with a chief complaint of Acute cholecystitis, gallstone pancreatitis (08 Jan 2023 07:25)        Acute pancreatitis without infection or necrosis               Allergies    No Known Allergies    Intolerances        ANTIBIOTICS/RELEVANT:  antimicrobials  caspofungin IVPB 50 milliGRAM(s) IV Intermittent every 24 hours  caspofungin IVPB        immunologic:  epoetin teena-epbx (RETACRIT) Injectable 5000 Unit(s) IV Push <User Schedule>    OTHER:  acetaminophen    Suspension .. 650 milliGRAM(s) Oral every 6 hours PRN  aMIOdarone    Tablet 200 milliGRAM(s) Oral daily  aspirin  chewable 81 milliGRAM(s) Oral daily  atorvastatin 80 milliGRAM(s) Oral at bedtime  chlorhexidine 2% Cloths 1 Application(s) Topical <User Schedule>  heparin   Injectable 5000 Unit(s) SubCutaneous every 8 hours  insulin lispro (ADMELOG) corrective regimen sliding scale   SubCutaneous every 6 hours  insulin NPH human recombinant 7 Unit(s) SubCutaneous every 6 hours  metoclopramide Injectable 5 milliGRAM(s) IV Push every 8 hours  metoprolol tartrate 12.5 milliGRAM(s) Oral every 12 hours  midodrine 10 milliGRAM(s) Oral every 8 hours  multivitamin/minerals/iron Oral Solution (CENTRUM) 15 milliLiter(s) Oral daily  pantoprazole  Injectable 40 milliGRAM(s) IV Push daily  polyethylene glycol 3350 17 Gram(s) Oral daily  senna 2 Tablet(s) Oral at bedtime  sevelamer carbonate Powder 800 milliGRAM(s) Oral three times a day with meals  simethicone 80 milliGRAM(s) Chew daily PRN  sodium chloride 0.9% lock flush 10 milliLiter(s) IV Push every 1 hour PRN  thiamine 100 milliGRAM(s) Enteral Tube daily      Objective:  Vital Signs Last 24 Hrs  T(C): 36.1 (08 Jan 2023 08:00), Max: 36.7 (07 Jan 2023 20:00)  T(F): 97 (08 Jan 2023 08:00), Max: 98 (07 Jan 2023 20:00)  HR: 90 (08 Jan 2023 09:00) (88 - 110)  BP: --  BP(mean): --  RR: 19 (08 Jan 2023 09:00) (3 - 37)  SpO2: 100% (08 Jan 2023 09:00) (98% - 100%)    Parameters below as of 08 Jan 2023 08:00  Patient On (Oxygen Delivery Method): tracheostomy collar    O2 Concentration (%): 40     Eyes:INOCENCIO, EOMI  Ear/Nose/Throat: no oral lesion, no sinus tenderness on percussion	  Neck:no JVD, no lymphadenopathy, supple  Respiratory: CTA lore  Cardiovascular: S1S2 RRR, no murmurs  Gastrointestinal:soft, (+) BS, no HSM  Extremities:no e/e/c        LABS:                        9.3    13.57 )-----------( 140      ( 08 Jan 2023 00:22 )             30.0     01-08    137  |  96  |  28<H>  ----------------------------<  109<H>  3.7   |  26  |  1.95<H>    Ca    9.2      08 Jan 2023 00:21  Phos  2.7     01-08  Mg     2.4     01-08    TPro  8.0  /  Alb  3.9  /  TBili  1.4<H>  /  DBili  x   /  AST  28  /  ALT  16  /  AlkPhos  86  01-08            MICROBIOLOGY:    RECENT CULTURES:  01-03 @ 21:09 .Blood Blood-Peripheral                No growth to date.    01-03 @ 20:45 .Blood Blood-Peripheral                No growth to date.    01-02 @ 01:01 .Blood Blood-Peripheral   PCR    Growth in anaerobic bottle: Gram Positive Cocci in Clusters    Blood Culture PCR  Blood Culture PCR     Growth in anaerobic bottle: Staphylococcus epidermidis  Coag Negative Staphylococcus  Single set isolate, possible contaminant. Contact  Microbiology if susceptibility testing clinically  indicated.  ***Blood Panel PCR results on this specimen are available  approximately 3 hours after the Gram stain result.***  Gram stain, PCR, and/or culture results may not always  correspond due to difference in methodologies.  ************************************************************  This PCR assay was performed by multiplex PCR. This  Assay tests for 66 bacterial and resistance gene targets.  Please refer to the [facility] [employer] test directory  at [email or web address] for details.    01-01 @ 17:45 .Sputum Sputum       Few polymorphonuclear leukocytes per low power field  Few Squamous epithelial cells per low power field  Rare Gram positive cocci in pairs per oil power field           Normal Respiratory Christy present          RESPIRATORY CULTURES:              RADIOLOGY & ADDITIONAL STUDIES:        Pager [phone]  After 5 pm/weekends or if no response :[phone] infectious diseases progress note:    Patient is a 62y old  Male who presents with a chief complaint of Acute cholecystitis, gallstone pancreatitis (08 Jan 2023 07:25)        Acute pancreatitis without infection or necrosis               Allergies    No Known Allergies    Intolerances        ANTIBIOTICS/RELEVANT:  antimicrobials  caspofungin IVPB 50 milliGRAM(s) IV Intermittent every 24 hours  caspofungin IVPB        immunologic:  epoetin teena-epbx (RETACRIT) Injectable 5000 Unit(s) IV Push <User Schedule>    OTHER:  acetaminophen    Suspension .. 650 milliGRAM(s) Oral every 6 hours PRN  aMIOdarone    Tablet 200 milliGRAM(s) Oral daily  aspirin  chewable 81 milliGRAM(s) Oral daily  atorvastatin 80 milliGRAM(s) Oral at bedtime  chlorhexidine 2% Cloths 1 Application(s) Topical <User Schedule>  heparin   Injectable 5000 Unit(s) SubCutaneous every 8 hours  insulin lispro (ADMELOG) corrective regimen sliding scale   SubCutaneous every 6 hours  insulin NPH human recombinant 7 Unit(s) SubCutaneous every 6 hours  metoclopramide Injectable 5 milliGRAM(s) IV Push every 8 hours  metoprolol tartrate 12.5 milliGRAM(s) Oral every 12 hours  midodrine 10 milliGRAM(s) Oral every 8 hours  multivitamin/minerals/iron Oral Solution (CENTRUM) 15 milliLiter(s) Oral daily  pantoprazole  Injectable 40 milliGRAM(s) IV Push daily  polyethylene glycol 3350 17 Gram(s) Oral daily  senna 2 Tablet(s) Oral at bedtime  sevelamer carbonate Powder 800 milliGRAM(s) Oral three times a day with meals  simethicone 80 milliGRAM(s) Chew daily PRN  sodium chloride 0.9% lock flush 10 milliLiter(s) IV Push every 1 hour PRN  thiamine 100 milliGRAM(s) Enteral Tube daily      Objective:  Vital Signs Last 24 Hrs  T(C): 36.1 (08 Jan 2023 08:00), Max: 36.7 (07 Jan 2023 20:00)  T(F): 97 (08 Jan 2023 08:00), Max: 98 (07 Jan 2023 20:00)  HR: 90 (08 Jan 2023 09:00) (88 - 110)  BP: --  BP(mean): --  RR: 19 (08 Jan 2023 09:00) (3 - 37)  SpO2: 100% (08 Jan 2023 09:00) (98% - 100%)    Parameters below as of 08 Jan 2023 08:00  Patient On (Oxygen Delivery Method): tracheostomy collar    O2 Concentration (%): 40     Eyes:INOCENCIO, EOMI  Ear/Nose/Throat: no oral lesion, no sinus tenderness on percussion	  Neck:no JVD, no lymphadenopathy, supple  Respiratory: CTA lore  Cardiovascular: S1S2 RRR, no murmurs  Gastrointestinal:soft, (+) BS, no HSM  Extremities:no e/e/c        LABS:                        9.3    13.57 )-----------( 140      ( 08 Jan 2023 00:22 )             30.0     01-08    137  |  96  |  28<H>  ----------------------------<  109<H>  3.7   |  26  |  1.95<H>    Ca    9.2      08 Jan 2023 00:21  Phos  2.7     01-08  Mg     2.4     01-08    TPro  8.0  /  Alb  3.9  /  TBili  1.4<H>  /  DBili  x   /  AST  28  /  ALT  16  /  AlkPhos  86  01-08            MICROBIOLOGY:    RECENT CULTURES:  01-03 @ 21:09 .Blood Blood-Peripheral                No growth to date.    01-03 @ 20:45 .Blood Blood-Peripheral                No growth to date.    01-02 @ 01:01 .Blood Blood-Peripheral   PCR    Growth in anaerobic bottle: Gram Positive Cocci in Clusters    Blood Culture PCR  Blood Culture PCR     Growth in anaerobic bottle: Staphylococcus epidermidis  Coag Negative Staphylococcus  Single set isolate, possible contaminant. Contact  Microbiology if susceptibility testing clinically  indicated.  ***Blood Panel PCR results on this specimen are available  approximately 3 hours after the Gram stain result.***  Gram stain, PCR, and/or culture results may not always  correspond due to difference in methodologies.  ************************************************************  This PCR assay was performed by multiplex PCR. This  Assay tests for 66 bacterial and resistance gene targets.  Please refer to the [facility] [employer] test directory  at [email or web address] for details.    01-01 @ 17:45 .Sputum Sputum       Few polymorphonuclear leukocytes per low power field  Few Squamous epithelial cells per low power field  Rare Gram positive cocci in pairs per oil power field           Normal Respiratory Christy present          RESPIRATORY CULTURES:              RADIOLOGY & ADDITIONAL STUDIES:        Pager [phone]  After 5 pm/weekends or if no response :[phone]

## 2023-01-08 NOTE — PROGRESS NOTE ADULT - ATTENDING COMMENTS
#eric  ERIC/ ATN in the setting of STEMI, cardiac arrest & cardiogenic shock  was nonoliguric but now oligoanuric  requiring HD/UF alternate days for UF  yesterday HD with UFl; HD MWF     xray stable today, net even  no urgent indication for hd or uf today  #has neck shiley   #VO/pul edema  pul congestion on xray stable  #mild hyperK-stable k today;     #hypotension-pressors per ct icu  #anemia - on MIGUEL, monitor hb trend  #hyperphos-conr renvela; check phos levels  d/w CT ICU  call if situation changes

## 2023-01-08 NOTE — PROGRESS NOTE ADULT - PROBLEM SELECTOR PLAN 1
Pt with non oliguric ERIC/ ATN in the setting of STEMI, cardiac arrest & cardiogenic shock  SCr on arrival was 2.15; trended down to hector 1.6 on 11/25; slowly trended up & increased to 3.95 11/28. Pt initiated on CRRT/CVVHDF to optimize volume and electrolytes on 12/5/22. Pt likely with ATN. Pt underwent decannulation of ECMO on 12/8/22 and was taken off CRRT.     Pt reinitiated on CRRT overnight on 12/9/22 for volume overload. s/p trach on 12/16. CRRT stopped again 12/19. Bladder scan daily. Now with [facility]. Off Lasix gtt.     Getting daily HD/UF. Remains on TC with FiO2 40% since 1/4. Last HD session 1/7 with 2L UF. CXR with improved b/l pulm vasc congestion. Net even in 24 hrs.    Will hold off on HD today  Will need tunneled catheter placed for ongoing dialysis needs once cleared from infectious standpoint.   Continue to monitor for renal recovery. Monitor labs and urine output. Avoid any potential nephrotoxins. Dose medications as per HD.

## 2023-01-08 NOTE — PROGRESS NOTE ADULT - SUBJECTIVE AND OBJECTIVE BOX
Patient seen and examined at the bedside.    Remained critically ill on continuous ICU monitoring.      Brief Summary:  61 y/o male with PMH of CABG, DM, HTN, HLD presenting with a STEMI.   He was in cardiogenic shock requiring VA ECMO, respiratory failure requiring tracheostomy, and renal failure requiring RRT.        24 Hour events:      OBJECTIVE:  Vital Signs Last 24 Hrs  T(C): 36.6 (08 Jan 2023 04:00), Max: 36.7 (07 Jan 2023 20:00)  T(F): 97.9 (08 Jan 2023 04:00), Max: 98 (07 Jan 2023 20:00)  HR: 88 (08 Jan 2023 07:00) (88 - 110)  BP: --  BP(mean): --  RR: 8 (08 Jan 2023 07:00) (3 - 37)  SpO2: 100% (08 Jan 2023 07:00) (96% - 100%)    Parameters below as of 08 Jan 2023 08:00  Patient On (Oxygen Delivery Method): tracheostomy collar    O2 Concentration (%): 40    Mode: vent off  FiO2: 40              Physical Exam:   General: Sitting in chair, no acute distress   Neurology: Following commands, alert and interactive  Eyes: Bilateral pupils reactive   ENT/Neck: Trach collar  Respiratory: Breath sounds bilaterally  CV: Sinus Rhythm   Abdominal: Soft, nontender  Extremities: Warm           -------------------------------------------------------------------------------------------------------------------------------    Labs:                        9.3    13.57 )-----------( 140      ( 08 Jan 2023 00:22 )             30.0     01-08    137  |  96  |  28<H>  ----------------------------<  109<H>  3.7   |  26  |  1.95<H>    Ca    9.2      08 Jan 2023 00:21  Phos  2.7     01-08  Mg     2.4     01-08    TPro  8.0  /  Alb  3.9  /  TBili  1.4<H>  /  DBili  x   /  AST  28  /  ALT  16  /  AlkPhos  86  01-08    LIVER FUNCTIONS - ( 08 Jan 2023 00:21 )  Alb: 3.9 g/dL / Pro: 8.0 g/dL / ALK PHOS: 86 U/L / ALT: 16 U/L / AST: 28 U/L / GGT: x             ABG - ( 08 Jan 2023 00:04 )  pH, Arterial: 7.53  pH, Blood: x     /  pCO2: 36    /  pO2: 166   / HCO3: 30    / Base Excess: 7.0   /  SaO2: 99.9              ------------------------------------------------------------------------------------------------------------------------------  Assessment:  61 y/o M admitted with a STEMI and cardiogenic shock.     Cardiogenic shock s/p VA ECMO decannulated 12/8  Mitral regurgitation s/p Mitral clip x1 12/16/22  Acute respiratory distress/failure s/p Tracheostomy 12/16/22   At high risk for hemodynamic instability  ERIC/Renal failure  Fungemia  Anemia  Thrombocytopenia  Leukocytosis         Plan:   ***Neuro***  PT ongoing.  Pain control with prns  Optimize day/night cycles.    ***Cardiovascular***  At high risk for hemodynamic instability and cardiac arrhythmias.  Lopressor and Midodrine to support blood pressure.  Continue Amiodarone  ASA /Statin daily        ***Pulmonary***  Respiratory failure s/p tracheostomy  Trach collar as tolerated.  Deep breathing and coughing exercises.  Wean oxygen as able.        ***GI***  Protein calorie malnutrition - Tube feeds via nasal feeding tube  Protonix for GI prophylaxis   Bowel regimen       ***Renal***  Renal failure and Hyperkalemia - HD and UF as tolerated.  Replete electrolytes as indicated.      ***ID***  Candidemia - On Caspofungin  WBC 13.57, contine to trend leukocytosis    ***Endocrine***  DM with hyperglycemia - Insulin sliding scale, NPH.      ***Hematology***  Acute blood loss anemia and Thrombocytopenia - no transfusion indication currently, will trend.  SQ Heparin for VTE prophylaxis              Patient requires continuous monitoring with bedside rhythm monitoring, pulse oximetry monitoring, and continuous central venous and arterial pressure monitoring; and intermittent blood gas analysis. Care plan discussed with the ICU care team.   Patient remained critical, at risk for life threatening decompensation.    I have spent 30 minutes providing critical care management to this patient.    By signing my name below, I, Abhijit Huber, attest that this documentation has been prepared under the direction and in the presence of Jade Rosas MD  Electronically signed: Abhijit Ngo, 01-08-23 @ 07:26    I, Jade Rosas MD, personally performed the services described in this documentation. all medical record entries made by the scribe were at my direction and in my presence. I have reviewed the chart and agree that the record reflects my personal performance and is accurate and complete  Electronically signed: Jade Rosas MD Patient seen and examined at the bedside.    Remained critically ill on continuous ICU monitoring.      Brief Summary:  63 y/o male with PMH of CABG, DM, HTN, HLD presenting with a STEMI.   He was in cardiogenic shock requiring VA ECMO, respiratory failure requiring tracheostomy, and renal failure requiring RRT.        24 Hour events:  - Holding feeds now  - Renal consulted, No dialysis today  - plan to take out HD cathedar   - Decannulating today at 11  - Starting Diuresis with Lasix      OBJECTIVE:  Vital Signs Last 24 Hrs  T(C): 36.6 (08 Jan 2023 04:00), Max: 36.7 (07 Jan 2023 20:00)  T(F): 97.9 (08 Jan 2023 04:00), Max: 98 (07 Jan 2023 20:00)  HR: 88 (08 Jan 2023 07:00) (88 - 110)  BP: --  BP(mean): --  RR: 8 (08 Jan 2023 07:00) (3 - 37)  SpO2: 100% (08 Jan 2023 07:00) (96% - 100%)    Parameters below as of 08 Jan 2023 08:00  Patient On (Oxygen Delivery Method): tracheostomy collar    O2 Concentration (%): 40    Mode: vent off  FiO2: 40              Physical Exam:   General: Sitting in chair, no acute distress   Neurology: Following commands, alert and interactive  Eyes: Bilateral pupils reactive   ENT/Neck: Trach collar  Respiratory: Breath sounds bilaterally  CV: Sinus Rhythm   Abdominal: Soft, nontender  Extremities: Warm           -------------------------------------------------------------------------------------------------------------------------------    Labs:                        9.3    13.57 )-----------( 140      ( 08 Jan 2023 00:22 )             30.0     01-08    137  |  96  |  28<H>  ----------------------------<  109<H>  3.7   |  26  |  1.95<H>    Ca    9.2      08 Jan 2023 00:21  Phos  2.7     01-08  Mg     2.4     01-08    TPro  8.0  /  Alb  3.9  /  TBili  1.4<H>  /  DBili  x   /  AST  28  /  ALT  16  /  AlkPhos  86  01-08    LIVER FUNCTIONS - ( 08 Jan 2023 00:21 )  Alb: 3.9 g/dL / Pro: 8.0 g/dL / ALK PHOS: 86 U/L / ALT: 16 U/L / AST: 28 U/L / GGT: x             ABG - ( 08 Jan 2023 00:04 )  pH, Arterial: 7.53  pH, Blood: x     /  pCO2: 36    /  pO2: 166   / HCO3: 30    / Base Excess: 7.0   /  SaO2: 99.9              ------------------------------------------------------------------------------------------------------------------------------  Assessment:  63 y/o M admitted with a STEMI and cardiogenic shock.     Cardiogenic shock s/p VA ECMO decannulated 12/8  Mitral regurgitation s/p Mitral clip x1 12/16/22  Acute respiratory distress/failure s/p Tracheostomy 12/16/22   At high risk for hemodynamic instability  ERIC/Renal failure  Fungemia  Anemia  Thrombocytopenia  Leukocytosis         Plan:   ***Neuro***  PT ongoing.  Pain control with prns  Optimize day/night cycles.    ***Cardiovascular***  At high risk for hemodynamic instability and cardiac arrhythmias.  Lopressor and Midodrine to support blood pressure.  Continue Amiodarone  ASA /Statin daily        ***Pulmonary***  Respiratory failure s/p tracheostomy  Trach collar as tolerated--> Decannulation at 11 AM  Deep breathing and coughing exercises.  Wean oxygen as able.        ***GI***  Protein calorie malnutrition - Tube feeds via nasal feeding tube, holding now  Protonix for GI prophylaxis   Bowel regimen       ***Renal***  Renal failure and Hyperkalemia - HD and UF as tolerated.  Replete electrolytes as indicated.  Renal consulted, No dialysis today  plan to take out HD cathedar   Starting diuresis with lasix    ***ID***  Candidemia - On Caspofungin  WBC 13.57, contine to trend leukocytosis    ***Endocrine***  DM with hyperglycemia - Insulin sliding scale, NPH.      ***Hematology***  Acute blood loss anemia and Thrombocytopenia - no transfusion indication currently, will trend.  SQ Heparin for VTE prophylaxis              Patient requires continuous monitoring with bedside rhythm monitoring, pulse oximetry monitoring, and continuous central venous and arterial pressure monitoring; and intermittent blood gas analysis. Care plan discussed with the ICU care team.   Patient remained critical, at risk for life threatening decompensation.    I have spent 30 minutes providing critical care management to this patient.    By signing my name below, I, Abhijit Ngo, attest that this documentation has been prepared under the direction and in the presence of Jade Rosas MD  Electronically signed: Abhijit Ngo, 01-08-23 @ 07:26    I, Jade Rosas MD, personally performed the services described in this documentation. all medical record entries made by the scribe were at my direction and in my presence. I have reviewed the chart and agree that the record reflects my personal performance and is accurate and complete  Electronically signed: Jade Rosas MD Patient seen and examined at the bedside.    Remained critically ill on continuous ICU monitoring.      Brief Summary:  61 y/o male with PMH of CABG, DM, HTN, HLD presenting with a STEMI.   He was in cardiogenic shock requiring VA ECMO, respiratory failure requiring tracheostomy, and renal failure requiring RRT.        24 Hour events:  - Holding feeds now  - Renal consulted, No dialysis today  - plan to take out HD cathedar   - Decannulating today at 11  - Starting Diuresis with Lasix      OBJECTIVE:  Vital Signs Last 24 Hrs  T(C): 36.6 (08 Jan 2023 04:00), Max: 36.7 (07 Jan 2023 20:00)  T(F): 97.9 (08 Jan 2023 04:00), Max: 98 (07 Jan 2023 20:00)  HR: 88 (08 Jan 2023 07:00) (88 - 110)  BP: --  BP(mean): --  RR: 8 (08 Jan 2023 07:00) (3 - 37)  SpO2: 100% (08 Jan 2023 07:00) (96% - 100%)    Parameters below as of 08 Jan 2023 08:00  Patient On (Oxygen Delivery Method): tracheostomy collar    O2 Concentration (%): 40    Mode: vent off  FiO2: 40              Physical Exam:   General: Sitting in chair, no acute distress   Neurology: Following commands, alert and interactive  Eyes: Bilateral pupils reactive   ENT/Neck: Trach collar  Respiratory: Breath sounds bilaterally  CV: Sinus Rhythm   Abdominal: Soft, nontender  Extremities: Warm           -------------------------------------------------------------------------------------------------------------------------------    Labs:                        9.3    13.57 )-----------( 140      ( 08 Jan 2023 00:22 )             30.0     01-08    137  |  96  |  28<H>  ----------------------------<  109<H>  3.7   |  26  |  1.95<H>    Ca    9.2      08 Jan 2023 00:21  Phos  2.7     01-08  Mg     2.4     01-08    TPro  8.0  /  Alb  3.9  /  TBili  1.4<H>  /  DBili  x   /  AST  28  /  ALT  16  /  AlkPhos  86  01-08    LIVER FUNCTIONS - ( 08 Jan 2023 00:21 )  Alb: 3.9 g/dL / Pro: 8.0 g/dL / ALK PHOS: 86 U/L / ALT: 16 U/L / AST: 28 U/L / GGT: x             ABG - ( 08 Jan 2023 00:04 )  pH, Arterial: 7.53  pH, Blood: x     /  pCO2: 36    /  pO2: 166   / HCO3: 30    / Base Excess: 7.0   /  SaO2: 99.9              ------------------------------------------------------------------------------------------------------------------------------  Assessment:  61 y/o M admitted with a STEMI and cardiogenic shock.     Cardiogenic shock s/p VA ECMO decannulated 12/8  Mitral regurgitation s/p Mitral clip x1 12/16/22  Acute respiratory distress/failure s/p Tracheostomy 12/16/22   At high risk for hemodynamic instability  ERIC/Renal failure  Fungemia  Anemia  Thrombocytopenia  Leukocytosis         Plan:   ***Neuro***  PT ongoing.  Pain control with prns  Optimize day/night cycles.    ***Cardiovascular***  At high risk for hemodynamic instability and cardiac arrhythmias.  Lopressor and Midodrine to support blood pressure.  Continue Amiodarone  ASA /Statin daily        ***Pulmonary***  Respiratory failure s/p tracheostomy  Trach collar as tolerated--> Decannulation at 11 AM  Deep breathing and coughing exercises.  Wean oxygen as able.        ***GI***  Protein calorie malnutrition - Tube feeds via nasal feeding tube, holding now  Protonix for GI prophylaxis   Bowel regimen       ***Renal***  Renal failure and Hyperkalemia - HD and UF as tolerated.  Replete electrolytes as indicated.  Renal consulted, No dialysis today  plan to take out HD cathedar   Starting diuresis with lasix    ***ID***  Candidemia - On Caspofungin  WBC 13.57, contine to trend leukocytosis    ***Endocrine***  DM with hyperglycemia - Insulin sliding scale, NPH.      ***Hematology***  Acute blood loss anemia and Thrombocytopenia - no transfusion indication currently, will trend.  SQ Heparin for VTE prophylaxis              Patient requires continuous monitoring with bedside rhythm monitoring, pulse oximetry monitoring, and continuous central venous and arterial pressure monitoring; and intermittent blood gas analysis. Care plan discussed with the ICU care team.   Patient remained critical, at risk for life threatening decompensation.    I have spent 30 minutes providing critical care management to this patient.    By signing my name below, I, Abhijit Ngo, attest that this documentation has been prepared under the direction and in the presence of Jade Rosas MD  Electronically signed: Abhijit Ngo, 01-08-23 @ 07:26    I, Jade Rosas MD, personally performed the services described in this documentation. all medical record entries made by the scribe were at my direction and in my presence. I have reviewed the chart and agree that the record reflects my personal performance and is accurate and complete  Electronically signed: Jade Rosas MD Patient seen and examined at the bedside.    Remains critically ill on continuous ICU monitoring.      Brief Summary:  63 y/o male with PMH of CABG, DM, HTN, HLD presenting with a STEMI.   He was in cardiogenic shock requiring VA ECMO, respiratory failure requiring tracheostomy, and renal failure requiring RRT.    24 Hour events:  - Trach decannulated this morning without issue  - No HD planned today, will give diuretics, plan for HD tomorrow    Objective:  Vital Signs Last 24 Hrs  T(C): 36.6 (08 Jan 2023 04:00), Max: 36.7 (07 Jan 2023 20:00)  T(F): 97.9 (08 Jan 2023 04:00), Max: 98 (07 Jan 2023 20:00)  HR: 88 (08 Jan 2023 07:00) (88 - 110)  BP: --  BP(mean): --  RR: 8 (08 Jan 2023 07:00) (3 - 37)  SpO2: 100% (08 Jan 2023 07:00) (96% - 100%)    Parameters below as of 08 Jan 2023 08:00  Patient On (Oxygen Delivery Method): tracheostomy collar    O2 Concentration (%): 40      Physical Exam:   General: Sitting in chair, no acute distress   Neurology: Following commands, alert and interactive  Eyes: Bilateral pupils reactive   ENT/Neck: Trach collar  Respiratory: Breath sounds bilaterally  CV: Sinus Rhythm   Abdominal: Soft, nontender  Extremities: Warm           -------------------------------------------------------------------------------------------------------------------------------    Labs:                        9.3    13.57 )-----------( 140      ( 08 Jan 2023 00:22 )             30.0     01-08    137  |  96  |  28<H>  ----------------------------<  109<H>  3.7   |  26  |  1.95<H>    Ca    9.2      08 Jan 2023 00:21  Phos  2.7     01-08  Mg     2.4     01-08    TPro  8.0  /  Alb  3.9  /  TBili  1.4<H>  /  DBili  x   /  AST  28  /  ALT  16  /  AlkPhos  86  01-08    LIVER FUNCTIONS - ( 08 Jan 2023 00:21 )  Alb: 3.9 g/dL / Pro: 8.0 g/dL / ALK PHOS: 86 U/L / ALT: 16 U/L / AST: 28 U/L / GGT: x             ABG - ( 08 Jan 2023 00:04 )  pH, Arterial: 7.53  pH, Blood: x     /  pCO2: 36    /  pO2: 166   / HCO3: 30    / Base Excess: 7.0   /  SaO2: 99.9          ------------------------------------------------------------------------------------------------------------------------------  Assessment:  63 y/o M admitted with a STEMI and cardiogenic shock.     Cardiogenic shock s/p VA ECMO decannulated 12/8  Mitral regurgitation s/p Mitral clip x1 12/16/22  Acute respiratory distress/failure s/p Tracheostomy 12/16/22   At high risk for hemodynamic instability  ERIC/Renal failure  Fungemia  Anemia      Plan:   ***Neuro***  PT ongoing.  Optimize day/night cycles.    ***Cardiovascular***  At high risk for hemodynamic instability and cardiac arrhythmias.  Midodrine to support blood pressure.  Continue Amiodarone  ASA /Statin daily    ***Pulmonary***  Respiratory failure s/p tracheostomy  Decannulated this morning.  Deep breathing and coughing exercises.  Wean oxygen as able.    ***GI***  Protein calorie malnutrition - Tube feeds via nasal feeding tube, resume after decannulation  Plan for SLP evaluation this week.  Protonix for GI prophylaxis   Bowel regimen     ***Renal***  Renal failure and Hyperkalemia - No HD today.  Replete electrolytes as indicated.  Lasix for volume removal.  He will need a permacath once blood cultures have cleared.    ***ID***  Candidemia - On Caspofungin  Follow up final blood cultures.    ***Endocrine***  DM with hyperglycemia - Insulin sliding scale, NPH.    ***Hematology***  Acute blood loss anemia - no transfusion indication currently, will trend.  SQ Heparin for VTE prophylaxis          Care plan discussed with the ICU care team.   Patient remains critical, at risk for life threatening decompensation.    I have spent 40 minutes providing critical care management to this patient.    By signing my name below, I, Abhijit Ngo, attest that this documentation has been prepared under the direction and in the presence of Jade Rosas MD  Electronically signed: Abhijit Ngo, 01-08-23 @ 07:26    I, Jade Rosas MD, personally performed the services described in this documentation. all medical record entries made by the scribe were at my direction and in my presence. I have reviewed the chart and agree that the record reflects my personal performance and is accurate and complete  Electronically signed: Jade Rosas MD Patient seen and examined at the bedside.    Remains critically ill on continuous ICU monitoring.      Brief Summary:  61 y/o male with PMH of CABG, DM, HTN, HLD presenting with a STEMI.   He was in cardiogenic shock requiring VA ECMO, respiratory failure requiring tracheostomy, and renal failure requiring RRT.    24 Hour events:  - Trach decannulated this morning without issue  - No HD planned today, will give diuretics, plan for HD tomorrow    Objective:  Vital Signs Last 24 Hrs  T(C): 36.6 (08 Jan 2023 04:00), Max: 36.7 (07 Jan 2023 20:00)  T(F): 97.9 (08 Jan 2023 04:00), Max: 98 (07 Jan 2023 20:00)  HR: 88 (08 Jan 2023 07:00) (88 - 110)  BP: --  BP(mean): --  RR: 8 (08 Jan 2023 07:00) (3 - 37)  SpO2: 100% (08 Jan 2023 07:00) (96% - 100%)    Parameters below as of 08 Jan 2023 08:00  Patient On (Oxygen Delivery Method): tracheostomy collar    O2 Concentration (%): 40      Physical Exam:   General: Sitting in chair, no acute distress   Neurology: Following commands, alert and interactive  Eyes: Bilateral pupils reactive   ENT/Neck: Trach collar  Respiratory: Breath sounds bilaterally  CV: Sinus Rhythm   Abdominal: Soft, nontender  Extremities: Warm           -------------------------------------------------------------------------------------------------------------------------------    Labs:                        9.3    13.57 )-----------( 140      ( 08 Jan 2023 00:22 )             30.0     01-08    137  |  96  |  28<H>  ----------------------------<  109<H>  3.7   |  26  |  1.95<H>    Ca    9.2      08 Jan 2023 00:21  Phos  2.7     01-08  Mg     2.4     01-08    TPro  8.0  /  Alb  3.9  /  TBili  1.4<H>  /  DBili  x   /  AST  28  /  ALT  16  /  AlkPhos  86  01-08    LIVER FUNCTIONS - ( 08 Jan 2023 00:21 )  Alb: 3.9 g/dL / Pro: 8.0 g/dL / ALK PHOS: 86 U/L / ALT: 16 U/L / AST: 28 U/L / GGT: x             ABG - ( 08 Jan 2023 00:04 )  pH, Arterial: 7.53  pH, Blood: x     /  pCO2: 36    /  pO2: 166   / HCO3: 30    / Base Excess: 7.0   /  SaO2: 99.9          ------------------------------------------------------------------------------------------------------------------------------  Assessment:  61 y/o M admitted with a STEMI and cardiogenic shock.     Cardiogenic shock s/p VA ECMO decannulated 12/8  Mitral regurgitation s/p Mitral clip x1 12/16/22  Acute respiratory distress/failure s/p Tracheostomy 12/16/22   At high risk for hemodynamic instability  ERIC/Renal failure  Fungemia  Anemia      Plan:   ***Neuro***  PT ongoing.  Optimize day/night cycles.    ***Cardiovascular***  At high risk for hemodynamic instability and cardiac arrhythmias.  Midodrine to support blood pressure.  Continue Amiodarone  ASA /Statin daily    ***Pulmonary***  Respiratory failure s/p tracheostomy  Decannulated this morning.  Deep breathing and coughing exercises.  Wean oxygen as able.    ***GI***  Protein calorie malnutrition - Tube feeds via nasal feeding tube, resume after decannulation  Plan for SLP evaluation this week.  Protonix for GI prophylaxis   Bowel regimen     ***Renal***  Renal failure and Hyperkalemia - No HD today.  Replete electrolytes as indicated.  Lasix for volume removal.  He will need a permacath once blood cultures have cleared.    ***ID***  Candidemia - On Caspofungin  Follow up final blood cultures.    ***Endocrine***  DM with hyperglycemia - Insulin sliding scale, NPH.    ***Hematology***  Acute blood loss anemia - no transfusion indication currently, will trend.  SQ Heparin for VTE prophylaxis          Care plan discussed with the ICU care team.   Patient remains critical, at risk for life threatening decompensation.    I have spent 40 minutes providing critical care management to this patient.    By signing my name below, I, Abhijit Ngo, attest that this documentation has been prepared under the direction and in the presence of Jade Rosas MD  Electronically signed: Abhijit Ngo, 01-08-23 @ 07:26    I, Jade Rosas MD, personally performed the services described in this documentation. all medical record entries made by the scribe were at my direction and in my presence. I have reviewed the chart and agree that the record reflects my personal performance and is accurate and complete  Electronically signed: Jade Rosas MD

## 2023-01-08 NOTE — PROGRESS NOTE ADULT - ASSESSMENT
62M CAD s/p CABG, DM, HTN, presented as a trasnfer from [place] for STEMI, found to have pancreatitis not necrotizing and cholecystitis on CT AP, admitted to to SICU for respiratory failure due to ARDS. Moved to the CTU for ECMO for cardiogenic shock vs ARDS, ECMO decannulated on 12/8 with IABP placement and mitral clip and removed on 12/17, s/p trac on 12/16/22, now on trach collar.    #Candidemia  Unclear source  Bcx positive for candida tropicalis on 12/26/22  Repeat with no growth to date  Limited TTE shows no evidence for valvular vegetations, mitral clip in place    #ESRD on HD  Nephrology following    shiley in place      Continue caspofungin 50mg daily   The JUAN of organism is on the high side against diflucan    isolated CNS in BC is a contaminate  eye exam down the line if needed      Follow repeat blood cultures - negative so far    needs at least two full weeks of caspo                                      62M CAD s/p CABG, DM, HTN, presented as a trasnfer from [place] for STEMI, found to have pancreatitis not necrotizing and cholecystitis on CT AP, admitted to to SICU for respiratory failure due to ARDS. Moved to the CTU for ECMO for cardiogenic shock vs ARDS, ECMO decannulated on 12/8 with IABP placement and mitral clip and removed on 12/17, s/p trac on 12/16/22, now on trach collar.    #Candidemia  Unclear source  Bcx positive for candida tropicalis on 12/26/22  Repeat with no growth to date  Limited TTE shows no evidence for valvular vegetations, mitral clip in place    #ESRD on HD  Nephrology following    shiley in place      Continue caspofungin 50mg daily   The JUAN of organism is on the high side against diflucan    isolated CNS in BC is a contaminate  eye exam down the line if needed      Follow repeat blood cultures - negative so far    needs at least two full weeks of caspo                                      62M CAD s/p CABG, DM, HTN, presented as a trasnfer from [place] for STEMI, found to have pancreatitis not necrotizing and cholecystitis on CT AP, admitted to to SICU for respiratory failure due to ARDS. Moved to the CTU for ECMO for cardiogenic shock vs ARDS, ECMO decannulated on 12/8 with IABP placement and mitral clip and removed on 12/17, s/p trac on 12/16/22, now on trach collar.    #Candidemia  Unclear source  Bcx positive for candida tropicalis on 12/26/22  Repeat with no growth to date  Limited TTE shows no evidence for valvular vegetations, mitral clip in place    #ESRD on HD  Nephrology following    shiley in place      Continue caspofungin 50mg daily   The JUAN of organism is on the high side against diflucan    isolated CNS in BC is a contaminate  eye exam down the line if needed      Follow repeat blood cultures - negative so far    needs at least two full weeks of caspo

## 2023-01-09 LAB
ALBUMIN SERPL ELPH-MCNC: 3.3 G/DL — SIGNIFICANT CHANGE UP (ref 3.3–5)
ALP SERPL-CCNC: 78 U/L — SIGNIFICANT CHANGE UP (ref 40–120)
ALT FLD-CCNC: 16 U/L — SIGNIFICANT CHANGE UP (ref 10–45)
ANION GAP SERPL CALC-SCNC: 16 MMOL/L — SIGNIFICANT CHANGE UP (ref 5–17)
AST SERPL-CCNC: 25 U/L — SIGNIFICANT CHANGE UP (ref 10–40)
BILIRUB SERPL-MCNC: 0.9 MG/DL — SIGNIFICANT CHANGE UP (ref 0.2–1.2)
BUN SERPL-MCNC: 58 MG/DL — HIGH (ref 7–23)
CALCIUM SERPL-MCNC: 9.3 MG/DL — SIGNIFICANT CHANGE UP (ref 8.4–10.5)
CHLORIDE SERPL-SCNC: 96 MMOL/L — SIGNIFICANT CHANGE UP (ref 96–108)
CO2 SERPL-SCNC: 23 MMOL/L — SIGNIFICANT CHANGE UP (ref 22–31)
CREAT SERPL-MCNC: 3.64 MG/DL — HIGH (ref 0.5–1.3)
CULTURE RESULTS: SIGNIFICANT CHANGE UP
EGFR: 18 ML/MIN/1.73M2 — LOW
GAS PNL BLDA: SIGNIFICANT CHANGE UP
GLUCOSE BLDC GLUCOMTR-MCNC: 127 MG/DL — HIGH (ref 70–99)
GLUCOSE BLDC GLUCOMTR-MCNC: 140 MG/DL — HIGH (ref 70–99)
GLUCOSE BLDC GLUCOMTR-MCNC: 165 MG/DL — HIGH (ref 70–99)
GLUCOSE SERPL-MCNC: 130 MG/DL — HIGH (ref 70–99)
HCT VFR BLD CALC: 28.2 % — LOW (ref 39–50)
HGB BLD-MCNC: 8.8 G/DL — LOW (ref 13–17)
MAGNESIUM SERPL-MCNC: 2.9 MG/DL — HIGH (ref 1.6–2.6)
MCHC RBC-ENTMCNC: 30.2 PG — SIGNIFICANT CHANGE UP (ref 27–34)
MCHC RBC-ENTMCNC: 31.2 GM/DL — LOW (ref 32–36)
MCV RBC AUTO: 96.9 FL — SIGNIFICANT CHANGE UP (ref 80–100)
NRBC # BLD: 0 /100 WBCS — SIGNIFICANT CHANGE UP (ref 0–0)
PHOSPHATE SERPL-MCNC: 5.9 MG/DL — HIGH (ref 2.5–4.5)
PLATELET # BLD AUTO: 157 K/UL — SIGNIFICANT CHANGE UP (ref 150–400)
POTASSIUM SERPL-MCNC: 4.5 MMOL/L — SIGNIFICANT CHANGE UP (ref 3.5–5.3)
POTASSIUM SERPL-SCNC: 4.5 MMOL/L — SIGNIFICANT CHANGE UP (ref 3.5–5.3)
PREALB SERPL-MCNC: 22 MG/DL — SIGNIFICANT CHANGE UP (ref 20–40)
PROT SERPL-MCNC: 7.2 G/DL — SIGNIFICANT CHANGE UP (ref 6–8.3)
RBC # BLD: 2.91 M/UL — LOW (ref 4.2–5.8)
RBC # FLD: 18.6 % — HIGH (ref 10.3–14.5)
SODIUM SERPL-SCNC: 135 MMOL/L — SIGNIFICANT CHANGE UP (ref 135–145)
SPECIMEN SOURCE: SIGNIFICANT CHANGE UP
WBC # BLD: 12.26 K/UL — HIGH (ref 3.8–10.5)
WBC # FLD AUTO: 12.26 K/UL — HIGH (ref 3.8–10.5)

## 2023-01-09 PROCEDURE — 71045 X-RAY EXAM CHEST 1 VIEW: CPT | Mod: 26

## 2023-01-09 PROCEDURE — 99291 CRITICAL CARE FIRST HOUR: CPT

## 2023-01-09 PROCEDURE — 99233 SBSQ HOSP IP/OBS HIGH 50: CPT | Mod: GC

## 2023-01-09 PROCEDURE — 93306 TTE W/DOPPLER COMPLETE: CPT | Mod: 26

## 2023-01-09 PROCEDURE — 99231 SBSQ HOSP IP/OBS SF/LOW 25: CPT

## 2023-01-09 RX ORDER — PHENYLEPHRINE HYDROCHLORIDE 10 MG/ML
0.29 INJECTION INTRAVENOUS
Qty: 40 | Refills: 0 | Status: DISCONTINUED | OUTPATIENT
Start: 2023-01-09 | End: 2023-01-10

## 2023-01-09 RX ORDER — CHLORPROMAZINE HCL 10 MG
25 TABLET ORAL ONCE
Refills: 0 | Status: COMPLETED | OUTPATIENT
Start: 2023-01-09 | End: 2023-01-09

## 2023-01-09 RX ORDER — MIDODRINE HYDROCHLORIDE 2.5 MG/1
10 TABLET ORAL ONCE
Refills: 0 | Status: COMPLETED | OUTPATIENT
Start: 2023-01-09 | End: 2023-01-09

## 2023-01-09 RX ORDER — SEVELAMER CARBONATE 2400 MG/1
1600 POWDER, FOR SUSPENSION ORAL EVERY 8 HOURS
Refills: 0 | Status: DISCONTINUED | OUTPATIENT
Start: 2023-01-09 | End: 2023-01-14

## 2023-01-09 RX ORDER — ACETAMINOPHEN 500 MG
1000 TABLET ORAL ONCE
Refills: 0 | Status: COMPLETED | OUTPATIENT
Start: 2023-01-09 | End: 2023-01-09

## 2023-01-09 RX ORDER — ALBUMIN HUMAN 25 %
250 VIAL (ML) INTRAVENOUS ONCE
Refills: 0 | Status: COMPLETED | OUTPATIENT
Start: 2023-01-09 | End: 2023-01-09

## 2023-01-09 RX ORDER — MIDODRINE HYDROCHLORIDE 2.5 MG/1
20 TABLET ORAL EVERY 8 HOURS
Refills: 0 | Status: DISCONTINUED | OUTPATIENT
Start: 2023-01-10 | End: 2023-01-19

## 2023-01-09 RX ADMIN — ERYTHROPOIETIN 5000 UNIT(S): 10000 INJECTION, SOLUTION INTRAVENOUS; SUBCUTANEOUS at 16:29

## 2023-01-09 RX ADMIN — SIMETHICONE 80 MILLIGRAM(S): 80 TABLET, CHEWABLE ORAL at 21:13

## 2023-01-09 RX ADMIN — HUMAN INSULIN 7 UNIT(S): 100 INJECTION, SUSPENSION SUBCUTANEOUS at 17:24

## 2023-01-09 RX ADMIN — MIDODRINE HYDROCHLORIDE 10 MILLIGRAM(S): 2.5 TABLET ORAL at 21:12

## 2023-01-09 RX ADMIN — Medication 102 MILLIGRAM(S): at 19:26

## 2023-01-09 RX ADMIN — Medication 5 MILLIGRAM(S): at 13:19

## 2023-01-09 RX ADMIN — Medication 5 MILLIGRAM(S): at 21:13

## 2023-01-09 RX ADMIN — CASPOFUNGIN ACETATE 260 MILLIGRAM(S): 7 INJECTION, POWDER, LYOPHILIZED, FOR SOLUTION INTRAVENOUS at 17:50

## 2023-01-09 RX ADMIN — HEPARIN SODIUM 5000 UNIT(S): 5000 INJECTION INTRAVENOUS; SUBCUTANEOUS at 00:51

## 2023-01-09 RX ADMIN — MIDODRINE HYDROCHLORIDE 10 MILLIGRAM(S): 2.5 TABLET ORAL at 05:40

## 2023-01-09 RX ADMIN — Medication 125 MILLILITER(S): at 22:11

## 2023-01-09 RX ADMIN — SEVELAMER CARBONATE 1600 MILLIGRAM(S): 2400 POWDER, FOR SUSPENSION ORAL at 21:12

## 2023-01-09 RX ADMIN — Medication 5 MILLIGRAM(S): at 05:41

## 2023-01-09 RX ADMIN — Medication 15 MILLILITER(S): at 11:53

## 2023-01-09 RX ADMIN — HUMAN INSULIN 7 UNIT(S): 100 INJECTION, SUSPENSION SUBCUTANEOUS at 11:51

## 2023-01-09 RX ADMIN — HUMAN INSULIN 7 UNIT(S): 100 INJECTION, SUSPENSION SUBCUTANEOUS at 00:50

## 2023-01-09 RX ADMIN — MIDODRINE HYDROCHLORIDE 10 MILLIGRAM(S): 2.5 TABLET ORAL at 23:21

## 2023-01-09 RX ADMIN — PANTOPRAZOLE SODIUM 40 MILLIGRAM(S): 20 TABLET, DELAYED RELEASE ORAL at 13:18

## 2023-01-09 RX ADMIN — AMIODARONE HYDROCHLORIDE 200 MILLIGRAM(S): 400 TABLET ORAL at 05:40

## 2023-01-09 RX ADMIN — HUMAN INSULIN 7 UNIT(S): 100 INJECTION, SUSPENSION SUBCUTANEOUS at 05:40

## 2023-01-09 RX ADMIN — HEPARIN SODIUM 5000 UNIT(S): 5000 INJECTION INTRAVENOUS; SUBCUTANEOUS at 17:25

## 2023-01-09 RX ADMIN — Medication 1000 MILLIGRAM(S): at 23:45

## 2023-01-09 RX ADMIN — Medication 12.5 MILLIGRAM(S): at 17:25

## 2023-01-09 RX ADMIN — Medication 3: at 11:51

## 2023-01-09 RX ADMIN — Medication 81 MILLIGRAM(S): at 11:53

## 2023-01-09 RX ADMIN — Medication 12.5 MILLIGRAM(S): at 05:41

## 2023-01-09 RX ADMIN — Medication 400 MILLIGRAM(S): at 23:30

## 2023-01-09 RX ADMIN — Medication 100 MILLIGRAM(S): at 11:54

## 2023-01-09 RX ADMIN — ATORVASTATIN CALCIUM 80 MILLIGRAM(S): 80 TABLET, FILM COATED ORAL at 21:12

## 2023-01-09 RX ADMIN — HEPARIN SODIUM 5000 UNIT(S): 5000 INJECTION INTRAVENOUS; SUBCUTANEOUS at 09:15

## 2023-01-09 RX ADMIN — SEVELAMER CARBONATE 1600 MILLIGRAM(S): 2400 POWDER, FOR SUSPENSION ORAL at 13:18

## 2023-01-09 NOTE — PROGRESS NOTE ADULT - SUBJECTIVE AND OBJECTIVE BOX
CRITICAL CARE ATTENDING - CTICU    MEDICATIONS  (STANDING):  aMIOdarone    Tablet 200 milliGRAM(s) Oral daily  aspirin  chewable 81 milliGRAM(s) Oral daily  atorvastatin 80 milliGRAM(s) Oral at bedtime  caspofungin IVPB 50 milliGRAM(s) IV Intermittent every 24 hours  caspofungin IVPB      chlorhexidine 2% Cloths 1 Application(s) Topical <User Schedule>  epoetin teena-epbx (RETACRIT) Injectable 5000 Unit(s) IV Push <User Schedule>  heparin   Injectable 5000 Unit(s) SubCutaneous every 8 hours  insulin lispro (ADMELOG) corrective regimen sliding scale   SubCutaneous every 6 hours  insulin NPH human recombinant 7 Unit(s) SubCutaneous every 6 hours  metoclopramide Injectable 5 milliGRAM(s) IV Push every 8 hours  metoprolol tartrate 12.5 milliGRAM(s) Oral every 12 hours  midodrine 10 milliGRAM(s) Oral every 8 hours  multivitamin/minerals/iron Oral Solution (CENTRUM) 15 milliLiter(s) Oral daily  pantoprazole  Injectable 40 milliGRAM(s) IV Push daily  polyethylene glycol 3350 17 Gram(s) Oral daily  senna 2 Tablet(s) Oral at bedtime  sevelamer carbonate 1600 milliGRAM(s) Oral every 8 hours  thiamine 100 milliGRAM(s) Enteral Tube daily                                    8.8    12.26 )-----------( 157      ( 2023 00:36 )             28.2           135  |  96  |  58<H>  ----------------------------<  130<H>  4.5   |  23  |  3.64<H>    Ca    9.3      2023 00:35  Phos  5.9       Mg     2.9         TPro  7.2  /  Alb  3.3  /  TBili  0.9  /  DBili  x   /  AST  25  /  ALT  16  /  AlkPhos  78                Daily     Daily Weight in k.3 (2023 01:00)      08 @ 07:01  -  09 @ 07:00  --------------------------------------------------------  IN: 1855 mL / OUT: 550 mL / NET: 1305 mL     @ 07: @ 14:44  --------------------------------------------------------  IN: 484 mL / OUT: 0 mL / NET: 484 mL        Critically Ill patient  : [ ] preoperative ,   [x ] post operative    Requires :  [ x] Arterial Line   [ ] Central Line  [ ] PA catheter  [ ] IABP  [ ] ECMO  [ ] LVAD  [ ] Ventilator  [ ] pacemaker [ ] Impella.                      [ x] ABG's     [x ] Pulse Oxymetry Monitoring  Bedside evaluation , monitoring , treatment of hemodynamics , fluids , IVP/ IVCD meds.        Diagnosis:     Cardiogenic Shock / STEMI    Pancreatitis     s/p VA ECMO / IABP - Decannulated     Respiratory Failure     Mitral Clip     Tracheostomy - decannulated yesterday     Renal Failure - Acute Kidney Injury     [ x] Hemodialysis - today  [ ] Hemofiltration:    [ x] negative fluid balance [ ] even fluid balance [ ] positive fluid balance, in a hemodynamically unstable patient.     CHF- acute [ x]   chronic [ ]    systolic [x ]   diastolic [ ]  Valvular [ ]          - Echo- EF -   30%          [ ] RV dysfunction          - Cxr-cardiomegally, edema          - Clinical-  [ ]inotropes   [ ]pressors   [ ]diuresis   [ ]IABP   [ ]ECMO   [ ]LVAD   [ x]Respiratory Failure    Fluid  overload     Hemodynamic lability,  instability. Requires IVCD [x ] vasopressors [ ] inotropes  [ ] vasodilator  [ ]IVSS fluid  to maintain MAP, perfusion, C.I.     Tolerates NG / NJ feeds at [ x] goal rate    [ ] trophic rate    [ ]       rate     Requires bedside physical therapy, mobilization and total jail care.                     -                         Discussed with CT surgeon, Physician's Assistant - Nurse Practitioner- Critical care medicine team.   Discussed at  AM / PM rounds.   Chart, labs , films reviewed.    Cumulative Critical Care Time Given Today : 30 min CRITICAL CARE ATTENDING - CTICU    MEDICATIONS  (STANDING):  aMIOdarone    Tablet 200 milliGRAM(s) Oral daily  aspirin  chewable 81 milliGRAM(s) Oral daily  atorvastatin 80 milliGRAM(s) Oral at bedtime  caspofungin IVPB 50 milliGRAM(s) IV Intermittent every 24 hours  caspofungin IVPB      chlorhexidine 2% Cloths 1 Application(s) Topical <User Schedule>  epoetin teena-epbx (RETACRIT) Injectable 5000 Unit(s) IV Push <User Schedule>  heparin   Injectable 5000 Unit(s) SubCutaneous every 8 hours  insulin lispro (ADMELOG) corrective regimen sliding scale   SubCutaneous every 6 hours  insulin NPH human recombinant 7 Unit(s) SubCutaneous every 6 hours  metoclopramide Injectable 5 milliGRAM(s) IV Push every 8 hours  metoprolol tartrate 12.5 milliGRAM(s) Oral every 12 hours  midodrine 10 milliGRAM(s) Oral every 8 hours  multivitamin/minerals/iron Oral Solution (CENTRUM) 15 milliLiter(s) Oral daily  pantoprazole  Injectable 40 milliGRAM(s) IV Push daily  polyethylene glycol 3350 17 Gram(s) Oral daily  senna 2 Tablet(s) Oral at bedtime  sevelamer carbonate 1600 milliGRAM(s) Oral every 8 hours  thiamine 100 milliGRAM(s) Enteral Tube daily                                    8.8    12.26 )-----------( 157      ( 2023 00:36 )             28.2           135  |  96  |  58<H>  ----------------------------<  130<H>  4.5   |  23  |  3.64<H>    Ca    9.3      2023 00:35  Phos  5.9       Mg     2.9         TPro  7.2  /  Alb  3.3  /  TBili  0.9  /  DBili  x   /  AST  25  /  ALT  16  /  AlkPhos  78                Daily     Daily Weight in k.3 (2023 01:00)      08 @ 07:01  -  09 @ 07:00  --------------------------------------------------------  IN: 1855 mL / OUT: 550 mL / NET: 1305 mL     @ 07: @ 14:44  --------------------------------------------------------  IN: 484 mL / OUT: 0 mL / NET: 484 mL        Critically Ill patient  : [ ] preoperative ,   [x ] post operative    Requires :  [ x] Arterial Line   [ ] Central Line  [ ] PA catheter  [ ] IABP  [ ] ECMO  [ ] LVAD  [ ] Ventilator  [ ] pacemaker [ ] Impella.                      [ x] ABG's     [x ] Pulse Oxymetry Monitoring  Bedside evaluation , monitoring , treatment of hemodynamics , fluids , IVP/ IVCD meds.        Diagnosis:     Cardiogenic Shock / STEMI    Pancreatitis     s/p VA ECMO / IABP - Decannulated     Respiratory Failure     Mitral Clip     Tracheostomy - decannulated yesterday     Renal Failure - Acute Kidney Injury     [ x] Hemodialysis - today  [ ] Hemofiltration:    [ x] negative fluid balance [ ] even fluid balance [ ] positive fluid balance, in a hemodynamically unstable patient.     CHF- acute [ x]   chronic [ ]    systolic [x ]   diastolic [ ]  Valvular [ ]          - Echo- EF -   30%          [ ] RV dysfunction          - Cxr-cardiomegally, edema          - Clinical-  [ ]inotropes   [ ]pressors   [ ]diuresis   [ ]IABP   [ ]ECMO   [ ]LVAD   [ x]Respiratory Failure    Fluid  overload     Hemodynamic lability,  instability. Requires IVCD [x ] vasopressors [ ] inotropes  [ ] vasodilator  [ ]IVSS fluid  to maintain MAP, perfusion, C.I.     Tolerates NG / NJ feeds at [ x] goal rate    [ ] trophic rate    [ ]       rate     Requires bedside physical therapy, mobilization and total senior living care.                     -                         Discussed with CT surgeon, Physician's Assistant - Nurse Practitioner- Critical care medicine team.   Discussed at  AM / PM rounds.   Chart, labs , films reviewed.    Cumulative Critical Care Time Given Today : 30 min CRITICAL CARE ATTENDING - CTICU    MEDICATIONS  (STANDING):  aMIOdarone    Tablet 200 milliGRAM(s) Oral daily  aspirin  chewable 81 milliGRAM(s) Oral daily  atorvastatin 80 milliGRAM(s) Oral at bedtime  caspofungin IVPB 50 milliGRAM(s) IV Intermittent every 24 hours  caspofungin IVPB      chlorhexidine 2% Cloths 1 Application(s) Topical <User Schedule>  epoetin teena-epbx (RETACRIT) Injectable 5000 Unit(s) IV Push <User Schedule>  heparin   Injectable 5000 Unit(s) SubCutaneous every 8 hours  insulin lispro (ADMELOG) corrective regimen sliding scale   SubCutaneous every 6 hours  insulin NPH human recombinant 7 Unit(s) SubCutaneous every 6 hours  metoclopramide Injectable 5 milliGRAM(s) IV Push every 8 hours  metoprolol tartrate 12.5 milliGRAM(s) Oral every 12 hours  midodrine 10 milliGRAM(s) Oral every 8 hours  multivitamin/minerals/iron Oral Solution (CENTRUM) 15 milliLiter(s) Oral daily  pantoprazole  Injectable 40 milliGRAM(s) IV Push daily  polyethylene glycol 3350 17 Gram(s) Oral daily  senna 2 Tablet(s) Oral at bedtime  sevelamer carbonate 1600 milliGRAM(s) Oral every 8 hours  thiamine 100 milliGRAM(s) Enteral Tube daily                                    8.8    12.26 )-----------( 157      ( 2023 00:36 )             28.2           135  |  96  |  58<H>  ----------------------------<  130<H>  4.5   |  23  |  3.64<H>    Ca    9.3      2023 00:35  Phos  5.9       Mg     2.9         TPro  7.2  /  Alb  3.3  /  TBili  0.9  /  DBili  x   /  AST  25  /  ALT  16  /  AlkPhos  78                Daily     Daily Weight in k.3 (2023 01:00)      08 @ 07:01  -  09 @ 07:00  --------------------------------------------------------  IN: 1855 mL / OUT: 550 mL / NET: 1305 mL     @ 07: @ 14:44  --------------------------------------------------------  IN: 484 mL / OUT: 0 mL / NET: 484 mL        Critically Ill patient  : [ ] preoperative ,   [x ] post operative    Requires :  [ x] Arterial Line   [ ] Central Line  [ ] PA catheter  [ ] IABP  [ ] ECMO  [ ] LVAD  [ ] Ventilator  [ ] pacemaker [ ] Impella.                      [ x] ABG's     [x ] Pulse Oxymetry Monitoring  Bedside evaluation , monitoring , treatment of hemodynamics , fluids , IVP/ IVCD meds.        Diagnosis:     Cardiogenic Shock / STEMI    Pancreatitis     s/p VA ECMO / IABP - Decannulated     Respiratory Failure     Mitral Clip     Tracheostomy - decannulated yesterday     Renal Failure - Acute Kidney Injury     [ x] Hemodialysis - today  [ ] Hemofiltration:    [ x] negative fluid balance [ ] even fluid balance [ ] positive fluid balance, in a hemodynamically unstable patient.     CHF- acute [ x]   chronic [ ]    systolic [x ]   diastolic [ ]  Valvular [ ]          - Echo- EF -   30%          [ ] RV dysfunction          - Cxr-cardiomegally, edema          - Clinical-  [ ]inotropes   [ ]pressors   [ ]diuresis   [ ]IABP   [ ]ECMO   [ ]LVAD   [ x]Respiratory Failure    Fluid  overload     Hemodynamic lability,  instability. Requires IVCD [x ] vasopressors [ ] inotropes  [ ] vasodilator  [ ]IVSS fluid  to maintain MAP, perfusion, C.I.     Tolerates NG / NJ feeds at [ x] goal rate    [ ] trophic rate    [ ]       rate     Requires bedside physical therapy, mobilization and total penitentiary care.                     -                         Discussed with CT surgeon, Physician's Assistant - Nurse Practitioner- Critical care medicine team.   Discussed at  AM / PM rounds.   Chart, labs , films reviewed.    Cumulative Critical Care Time Given Today : 30 min

## 2023-01-09 NOTE — PROGRESS NOTE ADULT - ATTENDING COMMENTS
#eric  ERIC/ [facility]N in the setting of STEMI, cardiac arrest & cardiogenic shock  was nonoliguric but now oliguric  requiring HD/UF alternate days for UF  Plan HD today  xray +congestion today increased  #has neck shiley   #VO/pul edema  pul congestion on xray  UF with HD   #mild hyperK-stable k today;     #hypotension-pressors per ct icu  #anemia - on MIGUEL, monitor hb trend  #hyperphos-cont renvela; check phos levels    d/w CT ICU

## 2023-01-09 NOTE — PROGRESS NOTE ADULT - SUBJECTIVE AND OBJECTIVE BOX
infectious diseases progress note:    Patient is a 62y old  Male who presents with a chief complaint of Acute cholecystitis, gallstone pancreatitis (08 Jan 2023 10:00)        Acute pancreatitis without infection or necrosis             Allergies    No Known Allergies    Intolerances        ANTIBIOTICS/RELEVANT:  antimicrobials  caspofungin IVPB 50 milliGRAM(s) IV Intermittent every 24 hours  caspofungin IVPB        immunologic:  epoetin teena-epbx (RETACRIT) Injectable 5000 Unit(s) IV Push <User Schedule>    OTHER:  acetaminophen    Suspension .. 650 milliGRAM(s) Oral every 6 hours PRN  aMIOdarone    Tablet 200 milliGRAM(s) Oral daily  aspirin  chewable 81 milliGRAM(s) Oral daily  atorvastatin 80 milliGRAM(s) Oral at bedtime  chlorhexidine 2% Cloths 1 Application(s) Topical <User Schedule>  heparin   Injectable 5000 Unit(s) SubCutaneous every 8 hours  insulin lispro (ADMELOG) corrective regimen sliding scale   SubCutaneous every 6 hours  insulin NPH human recombinant 7 Unit(s) SubCutaneous every 6 hours  metoclopramide Injectable 5 milliGRAM(s) IV Push every 8 hours  metoprolol tartrate 12.5 milliGRAM(s) Oral every 12 hours  midodrine 10 milliGRAM(s) Oral every 8 hours  multivitamin/minerals/iron Oral Solution (CENTRUM) 15 milliLiter(s) Oral daily  pantoprazole  Injectable 40 milliGRAM(s) IV Push daily  polyethylene glycol 3350 17 Gram(s) Oral daily  senna 2 Tablet(s) Oral at bedtime  sevelamer carbonate 1600 milliGRAM(s) Oral every 8 hours  simethicone 80 milliGRAM(s) Chew daily PRN  sodium chloride 0.9% lock flush 10 milliLiter(s) IV Push every 1 hour PRN  thiamine 100 milliGRAM(s) Enteral Tube daily      Objective:  Vital Signs Last 24 Hrs  T(C): 35.9 (09 Jan 2023 08:00), Max: 36.4 (08 Jan 2023 19:00)  T(F): 96.7 (09 Jan 2023 08:00), Max: 97.5 (08 Jan 2023 19:00)  HR: 92 (09 Jan 2023 08:00) (89 - 100)  BP: --  BP(mean): --  RR: 18 (09 Jan 2023 08:00) (10 - 26)  SpO2: 96% (09 Jan 2023 08:00) (95% - 100%)    Parameters below as of 09 Jan 2023 08:00  Patient On (Oxygen Delivery Method): room air           Eyes:INOCENCIO, EOMI  Ear/Nose/Throat: no oral lesion, no sinus tenderness on percussion	  Neck:no JVD, no lymphadenopathy, supple  Respiratory: CTA lore  Cardiovascular: S1S2 RRR, no murmurs  Gastrointestinal:soft, (+) BS, no HSM  Extremities:no e/e/c        LABS:                        8.8    12.26 )-----------( 157      ( 09 Jan 2023 00:36 )             28.2     01-09    135  |  96  |  58<H>  ----------------------------<  130<H>  4.5   |  23  |  3.64<H>    Ca    9.3      09 Jan 2023 00:35  Phos  5.9     01-09  Mg     2.9     01-09    TPro  7.2  /  Alb  3.3  /  TBili  0.9  /  DBili  x   /  AST  25  /  ALT  16  /  AlkPhos  78  01-09            MICROBIOLOGY:    RECENT CULTURES:  01-03 @ 21:09 .Blood Blood-Peripheral                No Growth Final    01-03 @ 20:45 .Blood Blood-Peripheral                No Growth Final          RESPIRATORY CULTURES:              RADIOLOGY & ADDITIONAL STUDIES:        Pager [phone]  After 5 pm/weekends or if no response :[phone] infectious diseases progress note:    Patient is a 62y old  Male who presents with a chief complaint of Acute cholecystitis, gallstone pancreatitis (08 Jan 2023 10:00)        Acute pancreatitis without infection or necrosis             Allergies    No Known Allergies    Intolerances        ANTIBIOTICS/RELEVANT:  antimicrobials  caspofungin IVPB 50 milliGRAM(s) IV Intermittent every 24 hours  caspofungin IVPB        immunologic:  epoetin teena-epbx (RETACRIT) Injectable 5000 Unit(s) IV Push <User Schedule>    OTHER:  acetaminophen    Suspension .. 650 milliGRAM(s) Oral every 6 hours PRN  aMIOdarone    Tablet 200 milliGRAM(s) Oral daily  aspirin  chewable 81 milliGRAM(s) Oral daily  atorvastatin 80 milliGRAM(s) Oral at bedtime  chlorhexidine 2% Cloths 1 Application(s) Topical <User Schedule>  heparin   Injectable 5000 Unit(s) SubCutaneous every 8 hours  insulin lispro (ADMELOG) corrective regimen sliding scale   SubCutaneous every 6 hours  insulin NPH human recombinant 7 Unit(s) SubCutaneous every 6 hours  metoclopramide Injectable 5 milliGRAM(s) IV Push every 8 hours  metoprolol tartrate 12.5 milliGRAM(s) Oral every 12 hours  midodrine 10 milliGRAM(s) Oral every 8 hours  multivitamin/minerals/iron Oral Solution (CENTRUM) 15 milliLiter(s) Oral daily  pantoprazole  Injectable 40 milliGRAM(s) IV Push daily  polyethylene glycol 3350 17 Gram(s) Oral daily  senna 2 Tablet(s) Oral at bedtime  sevelamer carbonate 1600 milliGRAM(s) Oral every 8 hours  simethicone 80 milliGRAM(s) Chew daily PRN  sodium chloride 0.9% lock flush 10 milliLiter(s) IV Push every 1 hour PRN  thiamine 100 milliGRAM(s) Enteral Tube daily      Objective:  Vital Signs Last 24 Hrs  T(C): 35.9 (09 Jan 2023 08:00), Max: 36.4 (08 Jan 2023 19:00)  T(F): 96.7 (09 Jan 2023 08:00), Max: 97.5 (08 Jan 2023 19:00)  HR: 92 (09 Jan 2023 08:00) (89 - 100)  BP: --  BP(mean): --  RR: 18 (09 Jan 2023 08:00) (10 - 26)  SpO2: 96% (09 Jan 2023 08:00) (95% - 100%)    Parameters below as of 09 Jan 2023 08:00  Patient On (Oxygen Delivery Method): room air           Eyes:INOCENCIO, EOMI  Ear/Nose/Throat: no oral lesion, no sinus tenderness on percussion	  Neck:no JVD, no lymphadenopathy, supple  Respiratory: CTA lore  Cardiovascular: S1S2 RRR, no murmurs  Gastrointestinal:soft, (+) BS, no HSM  Extremities:no e/e/c        LABS:                        8.8    12.26 )-----------( 157      ( 09 Jan 2023 00:36 )             28.2     01-09    135  |  96  |  58<H>  ----------------------------<  130<H>  4.5   |  23  |  3.64<H>    Ca    9.3      09 Jan 2023 00:35  Phos  5.9     01-09  Mg     2.9     01-09    TPro  7.2  /  Alb  3.3  /  TBili  0.9  /  DBili  x   /  AST  25  /  ALT  16  /  AlkPhos  78  01-09            MICROBIOLOGY:    RECENT CULTURES:  01-03 @ 21:09 .Blood Blood-Peripheral                No Growth Final    01-03 @ 20:45 .Blood Blood-Peripheral                No Growth Final          RESPIRATORY CULTURES:              RADIOLOGY & ADDITIONAL STUDIES:        Pager [phone]  After 5 pm/weekends or if no response :[phone] infectious diseases progress note:    Patient is a 62y old  Male who presents with a chief complaint of Acute cholecystitis, gallstone pancreatitis (08 Jan 2023 10:00)        Acute pancreatitis without infection or necrosis             Allergies    No Known Allergies    Intolerances        ANTIBIOTICS/RELEVANT:  antimicrobials  caspofungin IVPB 50 milliGRAM(s) IV Intermittent every 24 hours  caspofungin IVPB        immunologic:  epoetin teena-epbx (RETACRIT) Injectable 5000 Unit(s) IV Push <User Schedule>    OTHER:  acetaminophen    Suspension .. 650 milliGRAM(s) Oral every 6 hours PRN  aMIOdarone    Tablet 200 milliGRAM(s) Oral daily  aspirin  chewable 81 milliGRAM(s) Oral daily  atorvastatin 80 milliGRAM(s) Oral at bedtime  chlorhexidine 2% Cloths 1 Application(s) Topical <User Schedule>  heparin   Injectable 5000 Unit(s) SubCutaneous every 8 hours  insulin lispro (ADMELOG) corrective regimen sliding scale   SubCutaneous every 6 hours  insulin NPH human recombinant 7 Unit(s) SubCutaneous every 6 hours  metoclopramide Injectable 5 milliGRAM(s) IV Push every 8 hours  metoprolol tartrate 12.5 milliGRAM(s) Oral every 12 hours  midodrine 10 milliGRAM(s) Oral every 8 hours  multivitamin/minerals/iron Oral Solution (CENTRUM) 15 milliLiter(s) Oral daily  pantoprazole  Injectable 40 milliGRAM(s) IV Push daily  polyethylene glycol 3350 17 Gram(s) Oral daily  senna 2 Tablet(s) Oral at bedtime  sevelamer carbonate 1600 milliGRAM(s) Oral every 8 hours  simethicone 80 milliGRAM(s) Chew daily PRN  sodium chloride 0.9% lock flush 10 milliLiter(s) IV Push every 1 hour PRN  thiamine 100 milliGRAM(s) Enteral Tube daily      Objective:  Vital Signs Last 24 Hrs  T(C): 35.9 (09 Jan 2023 08:00), Max: 36.4 (08 Jan 2023 19:00)  T(F): 96.7 (09 Jan 2023 08:00), Max: 97.5 (08 Jan 2023 19:00)  HR: 92 (09 Jan 2023 08:00) (89 - 100)  BP: --  BP(mean): --  RR: 18 (09 Jan 2023 08:00) (10 - 26)  SpO2: 96% (09 Jan 2023 08:00) (95% - 100%)    Parameters below as of 09 Jan 2023 08:00  Patient On (Oxygen Delivery Method): room air           Eyes:INOCENCIO, EOMI  Ear/Nose/Throat: no oral lesion, no sinus tenderness on percussion	  Neck:no JVD, no lymphadenopathy, supple  Respiratory: CTA lore  Cardiovascular: S1S2 RRR, no murmurs  Gastrointestinal:soft, (+) BS, no HSM  Extremities:no e/e/c        LABS:                        8.8    12.26 )-----------( 157      ( 09 Jan 2023 00:36 )             28.2     01-09    135  |  96  |  58<H>  ----------------------------<  130<H>  4.5   |  23  |  3.64<H>    Ca    9.3      09 Jan 2023 00:35  Phos  5.9     01-09  Mg     2.9     01-09    TPro  7.2  /  Alb  3.3  /  TBili  0.9  /  DBili  x   /  AST  25  /  ALT  16  /  AlkPhos  78  01-09            MICROBIOLOGY:    RECENT CULTURES:  01-03 @ 21:09 .Blood Blood-Peripheral                No Growth Final    01-03 @ 20:45 .Blood Blood-Peripheral                No Growth Final          RESPIRATORY CULTURES:              RADIOLOGY & ADDITIONAL STUDIES:        Pager [phone]  After 5 pm/weekends or if no response :[phone]

## 2023-01-09 NOTE — PROGRESS NOTE ADULT - ASSESSMENT
62M CAD s/p CABG, DM, HTN, presented as a trasnfer from [place] for STEMI, found to have pancreatitis not necrotizing and cholecystitis on CT AP, admitted to to SICU for respiratory failure due to ARDS. Moved to the CTU for ECMO for cardiogenic shock vs ARDS, ECMO decannulated on 12/8 with IABP placement and mitral clip and removed on 12/17, s/p trac on 12/16/22, now on trach collar.    #Candidemia  Unclear source  Bcx positive for candida tropicalis on 12/26/22  Repeat with no growth to date  Limited TTE shows no evidence for valvular vegetations, mitral clip in place    #ESRD on HD  Nephrology following    shiley in place      Continue caspofungin 50mg daily   The JUAN of organism is on the high side against diflucan    isolated CNS in BC is a contaminate  eye exam down the line if needed      Follow repeat blood cultures - negative so far       plan to complete casp on thursday                                       62M CAD s/p CABG, DM, HTN, presented as a trasnfer from [place] for STEMI, found to have pancreatitis not necrotizing and cholecystitis on CT AP, admitted to to SICU for respiratory failure due to ARDS. Moved to the CTU for ECMO for cardiogenic shock vs ARDS, ECMO decannulated on 12/8 with IABP placement and mitral clip and removed on 12/17, s/p trac on 12/16/22, now on trach collar.    #Candidemia  Unclear source  Bcx positive for candida tropicalis on 12/26/22  Repeat with no growth to date  Limited TTE shows no evidence for valvular vegetations, mitral clip in place    #ESRD on HD  Nephrology following    shiley in place      Continue caspofungin 50mg daily   The JUAN of organism is on the high side against diflucan    isolated CNS in BC is a contaminate  eye exam down the line if needed      Follow repeat blood cultures - negative so far       plan to complete casp on thursday                                       62M CAD s/p CABG, DM, HTN, presented as a trasnfer from [place] for STEMI, found to have pancreatitis not necrotizing and cholecystitis on CT AP, admitted to to SICU for respiratory failure due to ARDS. Moved to the CTU for ECMO for cardiogenic shock vs ARDS, ECMO decannulated on 12/8 with IABP placement and mitral clip and removed on 12/17, s/p trac on 12/16/22, now on trach collar.    #Candidemia  Unclear source  Bcx positive for candida tropicalis on 12/26/22  Repeat with no growth to date  Limited TTE shows no evidence for valvular vegetations, mitral clip in place    #ESRD on HD  Nephrology following    shiley in place      Continue caspofungin 50mg daily   The JUAN of organism is on the high side against diflucan    isolated CNS in BC is a contaminate  eye exam down the line if needed      Follow repeat blood cultures - negative so far       plan to complete casp on thursday

## 2023-01-09 NOTE — PROGRESS NOTE ADULT - SUBJECTIVE AND OBJECTIVE BOX
[facility] Division of Kidney Diseases & Hypertension  FOLLOW UP NOTE  [phone]--------------------------------------------------------------------------------  Chief Complaint:Acute pancreatitis without infection or necrosis        HPI: 61 y/o male with PMH of CABG, DM, HTN, HLD presenting as transfer from [place] for c/f STEMI. Course c/b gallstone pancreatitis leading to ARDS and shock & cardiac arrest now on [facility] ECMO. Had significant troponin elevation and his LVEF down to 8%. Pt was deemed to be too unstable to have an angiogram and potential PCI due to his co-morbidities & a new subdural bleed. Pt being seen for ERIC. SCr on arrival was 2.15; trended down to hector 1.6 on 11/25 and eventually increased to 3.95 11/28. Pt received IV diuretics as per CTU. Pt initiated on CRRT on 12/5/22 to optimize volume status and electrolytes.  Pt underwent decannulation of ECMO on 12/8/22. Pt was restarted on 12/9/22 for volume overload. Pt underwent mitral clip OR (12/16/22). s/p trach on 12/16. Decannulated on 1/8       24 hour events/subjective: Patient seen & examined. Labs & vitals reviewed. Decannulated on 1/8 . Bp stable, off pressors. Last HD session 1/7 with 2L UF. CXR with worsening b/l pulm vasc congestion. Net positive 1.3L in 24 hrs. Bladder scan with 500 cc            PAST HISTORY  --------------------------------------------------------------------------------  No significant changes to PMH, PSH, FHx, SHx, unless otherwise noted    ALLERGIES & MEDICATIONS  --------------------------------------------------------------------------------  Allergies    No Known Allergies    Intolerances      Standing Inpatient Medications  aMIOdarone    Tablet 200 milliGRAM(s) Oral daily  aspirin  chewable 81 milliGRAM(s) Oral daily  atorvastatin 80 milliGRAM(s) Oral at bedtime  caspofungin IVPB 50 milliGRAM(s) IV Intermittent every 24 hours  caspofungin IVPB      chlorhexidine 2% Cloths 1 Application(s) Topical <User Schedule>  epoetin teena-epbx (RETACRIT) Injectable 5000 Unit(s) IV Push <User Schedule>  heparin   Injectable 5000 Unit(s) SubCutaneous every 8 hours  insulin lispro (ADMELOG) corrective regimen sliding scale   SubCutaneous every 6 hours  insulin NPH human recombinant 7 Unit(s) SubCutaneous every 6 hours  metoclopramide Injectable 5 milliGRAM(s) IV Push every 8 hours  metoprolol tartrate 12.5 milliGRAM(s) Oral every 12 hours  midodrine 10 milliGRAM(s) Oral every 8 hours  multivitamin/minerals/iron Oral Solution (CENTRUM) 15 milliLiter(s) Oral daily  pantoprazole  Injectable 40 milliGRAM(s) IV Push daily  polyethylene glycol 3350 17 Gram(s) Oral daily  senna 2 Tablet(s) Oral at bedtime  sevelamer carbonate Powder 800 milliGRAM(s) Oral three times a day with meals  thiamine 100 milliGRAM(s) Enteral Tube daily    PRN Inpatient Medications  acetaminophen    Suspension .. 650 milliGRAM(s) Oral every 6 hours PRN  simethicone 80 milliGRAM(s) Chew daily PRN  sodium chloride 0.9% lock flush 10 milliLiter(s) IV Push every 1 hour PRN      REVIEW OF SYSTEMS  --------------------------------------------------------------------------------  Gen: No chills  Respiratory: No dyspnea, cough  CV: No chest pain  GI: No abdominal pain, diarrhea,  nausea, vomiting  MSK:  no edema  Neuro: No dizziness/lightheadedness      All other systems were reviewed and are negative, except as noted.    VITALS/PHYSICAL EXAM  --------------------------------------------------------------------------------  T(C): 36.1 (01-08-23 @ 08:00), Max: 36.7 (01-07-23 @ 20:00)  HR: 90 (01-08-23 @ 09:00) (88 - 110)  BP: --  RR: 19 (01-08-23 @ 09:00) (3 - 37)  SpO2: 100% (01-08-23 @ 09:00) (98% - 100%)  Wt(kg): --        01-07-23 @ 07:01  -  01-08-23 @ 07:00  --------------------------------------------------------  IN: 1965 mL / OUT: 2040 mL / NET: -75 mL    01-08-23 @ 07:01  -  01-08-23 @ 10:01  --------------------------------------------------------  IN: 225 mL / OUT: 0 mL / NET: 225 mL      Physical exam:   Gen: NAD  	HEENT: Anicteric  	Pulm: CTA b/l   	CV: S1S2+  	Abd: Soft, +BS      	Ext: trace LE edema B/L  	Neuro: Awake  	Skin: Warm and dry  	Vascular access: RIJ non tunneled catheter    LABS/STUDIES  --------------------------------------------------------------------------------              9.3    13.57 >-----------<  140      [01-08-23 @ 00:22]              30.0     137  |  96  |  28  ----------------------------<  109      [01-08-23 @ 00:21]  3.7   |  26  |  1.95        Ca     9.2     [01-08-23 @ 00:21]      Mg     2.4     [01-08-23 @ 00:21]      Phos  2.7     [01-08-23 @ 00:21]    TPro  8.0  /  Alb  3.9  /  TBili  1.4  /  DBili  x   /  AST  28  /  ALT  16  /  AlkPhos  86  [01-08-23 @ 00:21]          Creatinine Trend:  SCr 1.95 [01-08 @ 00:21]  SCr 2.71 [01-07 @ 00:48]  SCr 3.80 [01-06 @ 00:38]  SCr 2.77 [01-05 @ 00:11]  SCr 3.71 [01-04 @ 00:27]          HBsAg Nonreact      [01-04-23 @ 10:02]     [facility] Division of Kidney Diseases & Hypertension  FOLLOW UP NOTE  [phone]--------------------------------------------------------------------------------  Chief Complaint:Acute pancreatitis without infection or necrosis        HPI: 63 y/o male with PMH of CABG, DM, HTN, HLD presenting as transfer from [place] for c/f STEMI. Course c/b gallstone pancreatitis leading to ARDS and shock & cardiac arrest now on [facility] ECMO. Had significant troponin elevation and his LVEF down to 8%. Pt was deemed to be too unstable to have an angiogram and potential PCI due to his co-morbidities & a new subdural bleed. Pt being seen for ERIC. SCr on arrival was 2.15; trended down to hector 1.6 on 11/25 and eventually increased to 3.95 11/28. Pt received IV diuretics as per CTU. Pt initiated on CRRT on 12/5/22 to optimize volume status and electrolytes.  Pt underwent decannulation of ECMO on 12/8/22. Pt was restarted on 12/9/22 for volume overload. Pt underwent mitral clip OR (12/16/22). s/p trach on 12/16. Decannulated on 1/8       24 hour events/subjective: Patient seen & examined. Labs & vitals reviewed. Decannulated on 1/8 . Bp stable, off pressors. Last HD session 1/7 with 2L UF. CXR with worsening b/l pulm vasc congestion. Net positive 1.3L in 24 hrs. Bladder scan with 500 cc            PAST HISTORY  --------------------------------------------------------------------------------  No significant changes to PMH, PSH, FHx, SHx, unless otherwise noted    ALLERGIES & MEDICATIONS  --------------------------------------------------------------------------------  Allergies    No Known Allergies    Intolerances      Standing Inpatient Medications  aMIOdarone    Tablet 200 milliGRAM(s) Oral daily  aspirin  chewable 81 milliGRAM(s) Oral daily  atorvastatin 80 milliGRAM(s) Oral at bedtime  caspofungin IVPB 50 milliGRAM(s) IV Intermittent every 24 hours  caspofungin IVPB      chlorhexidine 2% Cloths 1 Application(s) Topical <User Schedule>  epoetin teena-epbx (RETACRIT) Injectable 5000 Unit(s) IV Push <User Schedule>  heparin   Injectable 5000 Unit(s) SubCutaneous every 8 hours  insulin lispro (ADMELOG) corrective regimen sliding scale   SubCutaneous every 6 hours  insulin NPH human recombinant 7 Unit(s) SubCutaneous every 6 hours  metoclopramide Injectable 5 milliGRAM(s) IV Push every 8 hours  metoprolol tartrate 12.5 milliGRAM(s) Oral every 12 hours  midodrine 10 milliGRAM(s) Oral every 8 hours  multivitamin/minerals/iron Oral Solution (CENTRUM) 15 milliLiter(s) Oral daily  pantoprazole  Injectable 40 milliGRAM(s) IV Push daily  polyethylene glycol 3350 17 Gram(s) Oral daily  senna 2 Tablet(s) Oral at bedtime  sevelamer carbonate Powder 800 milliGRAM(s) Oral three times a day with meals  thiamine 100 milliGRAM(s) Enteral Tube daily    PRN Inpatient Medications  acetaminophen    Suspension .. 650 milliGRAM(s) Oral every 6 hours PRN  simethicone 80 milliGRAM(s) Chew daily PRN  sodium chloride 0.9% lock flush 10 milliLiter(s) IV Push every 1 hour PRN      REVIEW OF SYSTEMS  --------------------------------------------------------------------------------  Gen: No chills  Respiratory: No dyspnea, cough  CV: No chest pain  GI: No abdominal pain, diarrhea,  nausea, vomiting  MSK:  no edema  Neuro: No dizziness/lightheadedness      All other systems were reviewed and are negative, except as noted.    VITALS/PHYSICAL EXAM  --------------------------------------------------------------------------------  T(C): 36.1 (01-08-23 @ 08:00), Max: 36.7 (01-07-23 @ 20:00)  HR: 90 (01-08-23 @ 09:00) (88 - 110)  BP: --  RR: 19 (01-08-23 @ 09:00) (3 - 37)  SpO2: 100% (01-08-23 @ 09:00) (98% - 100%)  Wt(kg): --        01-07-23 @ 07:01  -  01-08-23 @ 07:00  --------------------------------------------------------  IN: 1965 mL / OUT: 2040 mL / NET: -75 mL    01-08-23 @ 07:01  -  01-08-23 @ 10:01  --------------------------------------------------------  IN: 225 mL / OUT: 0 mL / NET: 225 mL      Physical exam:   Gen: NAD  	HEENT: Anicteric  	Pulm: CTA b/l   	CV: S1S2+  	Abd: Soft, +BS      	Ext: trace LE edema B/L  	Neuro: Awake  	Skin: Warm and dry  	Vascular access: RIJ non tunneled catheter    LABS/STUDIES  --------------------------------------------------------------------------------              9.3    13.57 >-----------<  140      [01-08-23 @ 00:22]              30.0     137  |  96  |  28  ----------------------------<  109      [01-08-23 @ 00:21]  3.7   |  26  |  1.95        Ca     9.2     [01-08-23 @ 00:21]      Mg     2.4     [01-08-23 @ 00:21]      Phos  2.7     [01-08-23 @ 00:21]    TPro  8.0  /  Alb  3.9  /  TBili  1.4  /  DBili  x   /  AST  28  /  ALT  16  /  AlkPhos  86  [01-08-23 @ 00:21]          Creatinine Trend:  SCr 1.95 [01-08 @ 00:21]  SCr 2.71 [01-07 @ 00:48]  SCr 3.80 [01-06 @ 00:38]  SCr 2.77 [01-05 @ 00:11]  SCr 3.71 [01-04 @ 00:27]          HBsAg Nonreact      [01-04-23 @ 10:02]     [facility] Division of Kidney Diseases & Hypertension  FOLLOW UP NOTE  [phone]--------------------------------------------------------------------------------  Chief Complaint:Acute pancreatitis without infection or necrosis        HPI: 61 y/o male with PMH of CABG, DM, HTN, HLD presenting as transfer from [place] for c/f STEMI. Course c/b gallstone pancreatitis leading to ARDS and shock & cardiac arrest now on [facility] ECMO. Had significant troponin elevation and his LVEF down to 8%. Pt was deemed to be too unstable to have an angiogram and potential PCI due to his co-morbidities & a new subdural bleed. Pt being seen for ERIC. SCr on arrival was 2.15; trended down to hector 1.6 on 11/25 and eventually increased to 3.95 11/28. Pt received IV diuretics as per CTU. Pt initiated on CRRT on 12/5/22 to optimize volume status and electrolytes.  Pt underwent decannulation of ECMO on 12/8/22. Pt was restarted on 12/9/22 for volume overload. Pt underwent mitral clip OR (12/16/22). s/p trach on 12/16. Decannulated on 1/8       24 hour events/subjective: Patient seen & examined. Labs & vitals reviewed. Decannulated on 1/8 . Bp stable, off pressors. Last HD session 1/7 with 2L UF. CXR with worsening b/l pulm vasc congestion. Net positive 1.3L in 24 hrs. Bladder scan with 500 cc            PAST HISTORY  --------------------------------------------------------------------------------  No significant changes to PMH, PSH, FHx, SHx, unless otherwise noted    ALLERGIES & MEDICATIONS  --------------------------------------------------------------------------------  Allergies    No Known Allergies    Intolerances      Standing Inpatient Medications  aMIOdarone    Tablet 200 milliGRAM(s) Oral daily  aspirin  chewable 81 milliGRAM(s) Oral daily  atorvastatin 80 milliGRAM(s) Oral at bedtime  caspofungin IVPB 50 milliGRAM(s) IV Intermittent every 24 hours  caspofungin IVPB      chlorhexidine 2% Cloths 1 Application(s) Topical <User Schedule>  epoetin teena-epbx (RETACRIT) Injectable 5000 Unit(s) IV Push <User Schedule>  heparin   Injectable 5000 Unit(s) SubCutaneous every 8 hours  insulin lispro (ADMELOG) corrective regimen sliding scale   SubCutaneous every 6 hours  insulin NPH human recombinant 7 Unit(s) SubCutaneous every 6 hours  metoclopramide Injectable 5 milliGRAM(s) IV Push every 8 hours  metoprolol tartrate 12.5 milliGRAM(s) Oral every 12 hours  midodrine 10 milliGRAM(s) Oral every 8 hours  multivitamin/minerals/iron Oral Solution (CENTRUM) 15 milliLiter(s) Oral daily  pantoprazole  Injectable 40 milliGRAM(s) IV Push daily  polyethylene glycol 3350 17 Gram(s) Oral daily  senna 2 Tablet(s) Oral at bedtime  sevelamer carbonate Powder 800 milliGRAM(s) Oral three times a day with meals  thiamine 100 milliGRAM(s) Enteral Tube daily    PRN Inpatient Medications  acetaminophen    Suspension .. 650 milliGRAM(s) Oral every 6 hours PRN  simethicone 80 milliGRAM(s) Chew daily PRN  sodium chloride 0.9% lock flush 10 milliLiter(s) IV Push every 1 hour PRN      REVIEW OF SYSTEMS  --------------------------------------------------------------------------------  Gen: No chills  Respiratory: No dyspnea, cough  CV: No chest pain  GI: No abdominal pain, diarrhea,  nausea, vomiting  MSK:  no edema  Neuro: No dizziness/lightheadedness      All other systems were reviewed and are negative, except as noted.    VITALS/PHYSICAL EXAM  --------------------------------------------------------------------------------  T(C): 36.1 (01-08-23 @ 08:00), Max: 36.7 (01-07-23 @ 20:00)  HR: 90 (01-08-23 @ 09:00) (88 - 110)  BP: --  RR: 19 (01-08-23 @ 09:00) (3 - 37)  SpO2: 100% (01-08-23 @ 09:00) (98% - 100%)  Wt(kg): --        01-07-23 @ 07:01  -  01-08-23 @ 07:00  --------------------------------------------------------  IN: 1965 mL / OUT: 2040 mL / NET: -75 mL    01-08-23 @ 07:01  -  01-08-23 @ 10:01  --------------------------------------------------------  IN: 225 mL / OUT: 0 mL / NET: 225 mL      Physical exam:   Gen: NAD  	HEENT: Anicteric  	Pulm: CTA b/l   	CV: S1S2+  	Abd: Soft, +BS      	Ext: trace LE edema B/L  	Neuro: Awake  	Skin: Warm and dry  	Vascular access: RIJ non tunneled catheter    LABS/STUDIES  --------------------------------------------------------------------------------              9.3    13.57 >-----------<  140      [01-08-23 @ 00:22]              30.0     137  |  96  |  28  ----------------------------<  109      [01-08-23 @ 00:21]  3.7   |  26  |  1.95        Ca     9.2     [01-08-23 @ 00:21]      Mg     2.4     [01-08-23 @ 00:21]      Phos  2.7     [01-08-23 @ 00:21]    TPro  8.0  /  Alb  3.9  /  TBili  1.4  /  DBili  x   /  AST  28  /  ALT  16  /  AlkPhos  86  [01-08-23 @ 00:21]          Creatinine Trend:  SCr 1.95 [01-08 @ 00:21]  SCr 2.71 [01-07 @ 00:48]  SCr 3.80 [01-06 @ 00:38]  SCr 2.77 [01-05 @ 00:11]  SCr 3.71 [01-04 @ 00:27]          HBsAg Nonreact      [01-04-23 @ 10:02]

## 2023-01-09 NOTE — PROGRESS NOTE ADULT - PROBLEM SELECTOR PLAN 1
Pt with non oliguric ERIC/ ATN in the setting of STEMI, cardiac arrest & cardiogenic shock  SCr on arrival was 2.15; trended down to hector 1.6 on 11/25; slowly trended up & increased to 3.95 11/28. Pt initiated on CRRT/CVVHDF to optimize volume and electrolytes on 12/5/22. Pt likely with ATN. Pt underwent decannulation of ECMO on 12/8/22 and was taken off CRRT.     Pt reinitiated on CRRT overnight on 12/9/22 for volume overload. s/p trach on 12/16. CRRT stopped again 12/19. Bladder scan daily. Now with [facility]. Off Lasix gtt.     Getting daily HD/UF. Remains on TC with FiO2 40% since 1/4. Last HD session 1/7 with 2L UF. CXR with increasing b/l pulm vasc congestion. Net+ve 1.2L in 24 hrs. Increasing UOP with 500cc on bladder scan. Will do HD today as discussed with CTU. Plan for line holiday thereafter. Continue to monitor for renal recovery.   Monitor labs and urine output. Avoid any potential nephrotoxins. Dose medications as per HD.

## 2023-01-10 LAB
ALBUMIN SERPL ELPH-MCNC: 3.6 G/DL — SIGNIFICANT CHANGE UP (ref 3.3–5)
ALP SERPL-CCNC: 83 U/L — SIGNIFICANT CHANGE UP (ref 40–120)
ALT FLD-CCNC: 12 U/L — SIGNIFICANT CHANGE UP (ref 10–45)
ANION GAP SERPL CALC-SCNC: 13 MMOL/L — SIGNIFICANT CHANGE UP (ref 5–17)
AST SERPL-CCNC: 20 U/L — SIGNIFICANT CHANGE UP (ref 10–40)
BILIRUB SERPL-MCNC: 0.8 MG/DL — SIGNIFICANT CHANGE UP (ref 0.2–1.2)
BUN SERPL-MCNC: 38 MG/DL — HIGH (ref 7–23)
CALCIUM SERPL-MCNC: 9.1 MG/DL — SIGNIFICANT CHANGE UP (ref 8.4–10.5)
CHLORIDE SERPL-SCNC: 97 MMOL/L — SIGNIFICANT CHANGE UP (ref 96–108)
CO2 SERPL-SCNC: 26 MMOL/L — SIGNIFICANT CHANGE UP (ref 22–31)
CREAT SERPL-MCNC: 2.81 MG/DL — HIGH (ref 0.5–1.3)
EGFR: 25 ML/MIN/1.73M2 — LOW
GAS PNL BLDA: SIGNIFICANT CHANGE UP
GLUCOSE BLDC GLUCOMTR-MCNC: 111 MG/DL — HIGH (ref 70–99)
GLUCOSE BLDC GLUCOMTR-MCNC: 136 MG/DL — HIGH (ref 70–99)
GLUCOSE BLDC GLUCOMTR-MCNC: 150 MG/DL — HIGH (ref 70–99)
GLUCOSE BLDC GLUCOMTR-MCNC: 167 MG/DL — HIGH (ref 70–99)
GLUCOSE SERPL-MCNC: 145 MG/DL — HIGH (ref 70–99)
HCT VFR BLD CALC: 28.7 % — LOW (ref 39–50)
HGB BLD-MCNC: 9 G/DL — LOW (ref 13–17)
MAGNESIUM SERPL-MCNC: 2.6 MG/DL — SIGNIFICANT CHANGE UP (ref 1.6–2.6)
MCHC RBC-ENTMCNC: 30 PG — SIGNIFICANT CHANGE UP (ref 27–34)
MCHC RBC-ENTMCNC: 31.4 GM/DL — LOW (ref 32–36)
MCV RBC AUTO: 95.7 FL — SIGNIFICANT CHANGE UP (ref 80–100)
NRBC # BLD: 0 /100 WBCS — SIGNIFICANT CHANGE UP (ref 0–0)
PHOSPHATE SERPL-MCNC: 4.3 MG/DL — SIGNIFICANT CHANGE UP (ref 2.5–4.5)
PLATELET # BLD AUTO: 134 K/UL — LOW (ref 150–400)
POTASSIUM SERPL-MCNC: 4.3 MMOL/L — SIGNIFICANT CHANGE UP (ref 3.5–5.3)
POTASSIUM SERPL-SCNC: 4.3 MMOL/L — SIGNIFICANT CHANGE UP (ref 3.5–5.3)
PROT SERPL-MCNC: 7.6 G/DL — SIGNIFICANT CHANGE UP (ref 6–8.3)
RBC # BLD: 3 M/UL — LOW (ref 4.2–5.8)
RBC # FLD: 18.6 % — HIGH (ref 10.3–14.5)
SODIUM SERPL-SCNC: 136 MMOL/L — SIGNIFICANT CHANGE UP (ref 135–145)
WBC # BLD: 11.68 K/UL — HIGH (ref 3.8–10.5)
WBC # FLD AUTO: 11.68 K/UL — HIGH (ref 3.8–10.5)

## 2023-01-10 PROCEDURE — 71045 X-RAY EXAM CHEST 1 VIEW: CPT | Mod: 26

## 2023-01-10 PROCEDURE — 99291 CRITICAL CARE FIRST HOUR: CPT

## 2023-01-10 PROCEDURE — 99232 SBSQ HOSP IP/OBS MODERATE 35: CPT

## 2023-01-10 PROCEDURE — 99233 SBSQ HOSP IP/OBS HIGH 50: CPT | Mod: GC

## 2023-01-10 RX ORDER — CALCIUM GLUCONATE 100 MG/ML
1 VIAL (ML) INTRAVENOUS ONCE
Refills: 0 | Status: COMPLETED | OUTPATIENT
Start: 2023-01-10 | End: 2023-01-10

## 2023-01-10 RX ADMIN — SEVELAMER CARBONATE 1600 MILLIGRAM(S): 2400 POWDER, FOR SUSPENSION ORAL at 21:37

## 2023-01-10 RX ADMIN — POLYETHYLENE GLYCOL 3350 17 GRAM(S): 17 POWDER, FOR SOLUTION ORAL at 11:20

## 2023-01-10 RX ADMIN — HUMAN INSULIN 7 UNIT(S): 100 INJECTION, SUSPENSION SUBCUTANEOUS at 05:27

## 2023-01-10 RX ADMIN — Medication 100 GRAM(S): at 07:44

## 2023-01-10 RX ADMIN — HEPARIN SODIUM 5000 UNIT(S): 5000 INJECTION INTRAVENOUS; SUBCUTANEOUS at 09:56

## 2023-01-10 RX ADMIN — Medication 15 MILLILITER(S): at 11:19

## 2023-01-10 RX ADMIN — MIDODRINE HYDROCHLORIDE 20 MILLIGRAM(S): 2.5 TABLET ORAL at 21:36

## 2023-01-10 RX ADMIN — SIMETHICONE 80 MILLIGRAM(S): 80 TABLET, CHEWABLE ORAL at 07:44

## 2023-01-10 RX ADMIN — HUMAN INSULIN 7 UNIT(S): 100 INJECTION, SUSPENSION SUBCUTANEOUS at 00:28

## 2023-01-10 RX ADMIN — Medication 3: at 11:20

## 2023-01-10 RX ADMIN — AMIODARONE HYDROCHLORIDE 200 MILLIGRAM(S): 400 TABLET ORAL at 05:27

## 2023-01-10 RX ADMIN — MIDODRINE HYDROCHLORIDE 20 MILLIGRAM(S): 2.5 TABLET ORAL at 05:19

## 2023-01-10 RX ADMIN — ATORVASTATIN CALCIUM 80 MILLIGRAM(S): 80 TABLET, FILM COATED ORAL at 21:36

## 2023-01-10 RX ADMIN — HEPARIN SODIUM 5000 UNIT(S): 5000 INJECTION INTRAVENOUS; SUBCUTANEOUS at 00:29

## 2023-01-10 RX ADMIN — SENNA PLUS 2 TABLET(S): 8.6 TABLET ORAL at 21:37

## 2023-01-10 RX ADMIN — Medication 5 MILLIGRAM(S): at 05:21

## 2023-01-10 RX ADMIN — HUMAN INSULIN 7 UNIT(S): 100 INJECTION, SUSPENSION SUBCUTANEOUS at 11:21

## 2023-01-10 RX ADMIN — HEPARIN SODIUM 5000 UNIT(S): 5000 INJECTION INTRAVENOUS; SUBCUTANEOUS at 18:36

## 2023-01-10 RX ADMIN — SEVELAMER CARBONATE 1600 MILLIGRAM(S): 2400 POWDER, FOR SUSPENSION ORAL at 05:19

## 2023-01-10 RX ADMIN — CHLORHEXIDINE GLUCONATE 1 APPLICATION(S): 213 SOLUTION TOPICAL at 01:17

## 2023-01-10 RX ADMIN — PANTOPRAZOLE SODIUM 40 MILLIGRAM(S): 20 TABLET, DELAYED RELEASE ORAL at 11:19

## 2023-01-10 RX ADMIN — Medication 81 MILLIGRAM(S): at 11:23

## 2023-01-10 RX ADMIN — CASPOFUNGIN ACETATE 260 MILLIGRAM(S): 7 INJECTION, POWDER, LYOPHILIZED, FOR SOLUTION INTRAVENOUS at 18:36

## 2023-01-10 RX ADMIN — Medication 12.5 MILLIGRAM(S): at 18:37

## 2023-01-10 RX ADMIN — SEVELAMER CARBONATE 1600 MILLIGRAM(S): 2400 POWDER, FOR SUSPENSION ORAL at 13:57

## 2023-01-10 RX ADMIN — MIDODRINE HYDROCHLORIDE 20 MILLIGRAM(S): 2.5 TABLET ORAL at 13:57

## 2023-01-10 RX ADMIN — Medication 100 MILLIGRAM(S): at 11:20

## 2023-01-10 RX ADMIN — HUMAN INSULIN 7 UNIT(S): 100 INJECTION, SUSPENSION SUBCUTANEOUS at 17:30

## 2023-01-10 NOTE — SWALLOW FEES ASSESSMENT ADULT - SLP GENERAL OBSERVATIONS
Pt encountered upright in bed w/ spouse present. +room air, +KFT, +tele (VSS), +bandage over healing stoma (s/p recent decannulation 1/8). He was AAOx4, pleasant, and cooperative. Able to follow all complex commands and answer all questions for purposes of evaluation. Vocal quality and cough deemed strong. Intermittent leak speech from healing stoma site.

## 2023-01-10 NOTE — PROGRESS NOTE ADULT - PROBLEM SELECTOR PLAN 7
s/p trach 12/16/22 and decannulated on 1/8  saturating well on 2LNC individual instruction/written material/skill demonstration

## 2023-01-10 NOTE — PROGRESS NOTE ADULT - PROBLEM SELECTOR PLAN 3
- troponin peaked at > 06858   - [facility] 12/06 with IABP placement revealed that 3 grafts are closed w/ distal native disease from remaining LAD graft  - continue statin and aspirin.  - eventual beta blocker, however will defer for now as he is still on midodrine - troponin peaked at > 99466   - [facility] 12/06 with IABP placement revealed that 3 grafts are closed w/ distal native disease from remaining LAD graft  - continue statin and aspirin.  - eventual beta blocker, however will defer for now as he is still on midodrine - troponin peaked at > 88251   - [facility] 12/06 with IABP placement revealed that 3 grafts are closed w/ distal native disease from remaining LAD graft  - continue statin and aspirin.  - eventual beta blocker, however will defer for now as he is still on midodrine

## 2023-01-10 NOTE — SWALLOW BEDSIDE ASSESSMENT ADULT - SWALLOW EVAL: DIAGNOSIS
61 y/o M with prolonged and complicated hospital course, admitted 11/24/22 with a STEMI and cardiogenic shock, s/p VA ECMO decannulated 12/8, MR s/p Mitral clip x1 12/16, s/p IABP removal 12/17, and acute respiratory distress/failure s/p trach 12/16-1/8. Pt was seen today for Bedside Swallow Evaluation to determine candidacy for objective evaluation of swallow. Conservative PO trials administered (crushed ice chips and thin liquids via tsp) given high risk for aspiration (h/o trach/prolonged hospital course). No overt clinical s/s of aspiration or penetration across all trials. Timely trigger of pharyngeal swallow. FEES scheduled.

## 2023-01-10 NOTE — PROGRESS NOTE ADULT - PROBLEM SELECTOR PLAN 5
- likely arrest associated ATN, hypovolemia; nonoliguric   - was on CVVH now on iHD   - appreciate nephrology recommendations   - avoid nephrotoxins  - pending permacath with IR

## 2023-01-10 NOTE — SWALLOW FEES ASSESSMENT ADULT - COMMENTS
Hx cont: 12/13: IABP in place. On CVVHD. Mental status is improving. s/p trach and Mitral clip inserted in OR 12/16. S/P #7 Proximal XLT Tracheostomy to vent. s/p IABP removal 12/17. Intermittently requiring HD. Vascular surgery consulted for AVF planning 12/21. Tolerated trach collar all day 12/25.  Ophthalmology consulted in the setting of candidemia on 12/30. F/u as OP. Wound care consulted 1/5/23 for sacral/bilateral buttocks skin damage. s/p trach decannulation 1/8/23.     IMAGING:  CXR: 1/9/23  IMPRESSION: Pulmonary congestion.    CT HEAD: 12/13/22  IMPRESSION: Similar minimal increased density along the right aspect of the posterior falx and right tentorial leaf, consistent with minimal residual subdural hemorrhage. No parenchymal hemorrhage or brain edema.    Pt is unknown to this service.  *Seen by this service 1/10 for bedside swallow evaluation with recommendations for NPO, with non-oral nutrition/hydration/medications pending FEES. + KFT present (noted to be compressing L arytenoid). --> following trials of ice chips, moderately thick liquids, and mildly thick liquids, KFT pulled w/ clearance by BROOKE Terrazas given impact on swallow function  + b/l vocal cord contact

## 2023-01-10 NOTE — PROGRESS NOTE ADULT - SUBJECTIVE AND OBJECTIVE BOX
CRITICAL CARE ATTENDING - CTICU    MEDICATIONS  (STANDING):  aMIOdarone    Tablet 200 milliGRAM(s) Oral daily  aspirin  chewable 81 milliGRAM(s) Oral daily  atorvastatin 80 milliGRAM(s) Oral at bedtime  caspofungin IVPB 50 milliGRAM(s) IV Intermittent every 24 hours  caspofungin IVPB      chlorhexidine 2% Cloths 1 Application(s) Topical <User Schedule>  epoetin teena-epbx (RETACRIT) Injectable 5000 Unit(s) IV Push <User Schedule>  heparin   Injectable 5000 Unit(s) SubCutaneous every 8 hours  insulin lispro (ADMELOG) corrective regimen sliding scale   SubCutaneous every 6 hours  insulin NPH human recombinant 7 Unit(s) SubCutaneous every 6 hours  metoprolol tartrate 12.5 milliGRAM(s) Oral every 12 hours  midodrine 20 milliGRAM(s) Oral every 8 hours  multivitamin/minerals/iron Oral Solution (CENTRUM) 15 milliLiter(s) Oral daily  pantoprazole  Injectable 40 milliGRAM(s) IV Push daily  phenylephrine    Infusion 0.287 MICROgram(s)/kG/Min (10 mL/Hr) IV Continuous <Continuous>  polyethylene glycol 3350 17 Gram(s) Oral daily  senna 2 Tablet(s) Oral at bedtime  sevelamer carbonate 1600 milliGRAM(s) Oral every 8 hours  thiamine 100 milliGRAM(s) Enteral Tube daily                        9.0    11.68 )-----------( 134      ( 10 Teo 2023 00:36 )             28.7       01-10    136  |  97  |  38<H>  ----------------------------<  145<H>  4.3   |  26  |  2.81<H>    Ca    9.1      10 Teo 2023 00:36  Phos  4.3     01-10  Mg     2.6     01-10    TPro  7.6  /  Alb  3.6  /  TBili  0.8  /  DBili  x   /  AST  20  /  ALT  12  /  AlkPhos  83  01-10      Daily       01-08 @ 07:01  -  01-09 @ 07:00  --------------------------------------------------------  IN: 1855 mL / OUT: 550 mL / NET: 1305 mL    01-09 @ 07:01  -  01-10 @ 06:31  --------------------------------------------------------  IN: 2934.2 mL / OUT: 3025 mL / NET: -90.8 mL        Critically Ill patient  : [ ] preoperative ,   [x ] post operative    Requires :  [ x] Arterial Line   [ ] Central Line  [ ] PA catheter  [ ] IABP  [ ] ECMO  [ ] LVAD  [ ] Ventilator  [ ] pacemaker [ ] Impella.                      [ x] ABG's     [x ] Pulse Oxymetry Monitoring  Bedside evaluation , monitoring , treatment of hemodynamics , fluids , IVP/ IVCD meds.        Diagnosis:     Cardiogenic Shock / STEMI    Pancreatitis     s/p VA ECMO / IABP - Decannulated     Respiratory Failure     Mitral Clip     Tracheostomy - decannulated yesterday     Renal Failure - Acute Kidney Injury     [ x] Hemodialysis - today  [ ] Hemofiltration:    [ x] negative fluid balance [ ] even fluid balance [ ] positive fluid balance, in a hemodynamically unstable patient.     CHF- acute [ x]   chronic [ ]    systolic [x ]   diastolic [ ]  Valvular [ ]          - Echo- EF -   30%          [ ] RV dysfunction          - Cxr-cardiomegally, edema          - Clinical-  [ ]inotropes   [x]pressors - PO midodrine  [ ]diuresis   [ ]IABP   [ ]ECMO   [ ]LVAD   [ x]Respiratory Failure    Hemodynamic lability,  instability. Requires IVCD [ ] vasopressors [ ] inotropes  [ ] vasodilator  [ ]IVSS fluid  to maintain MAP, perfusion, C.I.     Thrombocytopenia     Tolerates NG / NJ feeds at [ x] goal rate    [ ] trophic rate    [ ]       rate     Requires bedside physical therapy, mobilization and total halfway care.         By signing my name below, I, Rosio Alonzo, attest that this documentation has been prepared under the direction and in the presence of Finn Zelaya MD.   Electronically Signed: Brian Burnett 01-10-23 @ 06:31      Discussed with CT surgeon, Physician Assistant - Nurse Practitioner- Critical care medicine team.   Discussed at  AM / PM rounds.   Chart, labs , films reviewed.    Cumulative Critical Care Time Given Today:  CRITICAL CARE ATTENDING - CTICU    MEDICATIONS  (STANDING):  aMIOdarone    Tablet 200 milliGRAM(s) Oral daily  aspirin  chewable 81 milliGRAM(s) Oral daily  atorvastatin 80 milliGRAM(s) Oral at bedtime  caspofungin IVPB 50 milliGRAM(s) IV Intermittent every 24 hours  caspofungin IVPB      chlorhexidine 2% Cloths 1 Application(s) Topical <User Schedule>  epoetin teena-epbx (RETACRIT) Injectable 5000 Unit(s) IV Push <User Schedule>  heparin   Injectable 5000 Unit(s) SubCutaneous every 8 hours  insulin lispro (ADMELOG) corrective regimen sliding scale   SubCutaneous every 6 hours  insulin NPH human recombinant 7 Unit(s) SubCutaneous every 6 hours  metoprolol tartrate 12.5 milliGRAM(s) Oral every 12 hours  midodrine 20 milliGRAM(s) Oral every 8 hours  multivitamin/minerals/iron Oral Solution (CENTRUM) 15 milliLiter(s) Oral daily  pantoprazole  Injectable 40 milliGRAM(s) IV Push daily  phenylephrine    Infusion 0.287 MICROgram(s)/kG/Min (10 mL/Hr) IV Continuous <Continuous>  polyethylene glycol 3350 17 Gram(s) Oral daily  senna 2 Tablet(s) Oral at bedtime  sevelamer carbonate 1600 milliGRAM(s) Oral every 8 hours  thiamine 100 milliGRAM(s) Enteral Tube daily                        9.0    11.68 )-----------( 134      ( 10 Teo 2023 00:36 )             28.7       01-10    136  |  97  |  38<H>  ----------------------------<  145<H>  4.3   |  26  |  2.81<H>    Ca    9.1      10 Teo 2023 00:36  Phos  4.3     01-10  Mg     2.6     01-10    TPro  7.6  /  Alb  3.6  /  TBili  0.8  /  DBili  x   /  AST  20  /  ALT  12  /  AlkPhos  83  01-10      Daily       01-08 @ 07:01  -  01-09 @ 07:00  --------------------------------------------------------  IN: 1855 mL / OUT: 550 mL / NET: 1305 mL    01-09 @ 07:01  -  01-10 @ 06:31  --------------------------------------------------------  IN: 2934.2 mL / OUT: 3025 mL / NET: -90.8 mL        Critically Ill patient  : [ ] preoperative ,   [x ] post operative    Requires :  [ x] Arterial Line   [ ] Central Line  [ ] PA catheter  [ ] IABP  [ ] ECMO  [ ] LVAD  [ ] Ventilator  [ ] pacemaker [ ] Impella.                      [ x] ABG's     [x ] Pulse Oxymetry Monitoring  Bedside evaluation , monitoring , treatment of hemodynamics , fluids , IVP/ IVCD meds.        Diagnosis:     Cardiogenic Shock / STEMI    Pancreatitis     s/p VA ECMO / IABP - Decannulated     Respiratory Failure     Mitral Clip     Tracheostomy - decannulated yesterday     Renal Failure - Acute Kidney Injury     [ x] Hemodialysis - today  [ ] Hemofiltration:    [ x] negative fluid balance [ ] even fluid balance [ ] positive fluid balance, in a hemodynamically unstable patient.     CHF- acute [ x]   chronic [ ]    systolic [x ]   diastolic [ ]  Valvular [ ]          - Echo- EF -   30%          [ ] RV dysfunction          - Cxr-cardiomegally, edema          - Clinical-  [ ]inotropes   [x]pressors - PO midodrine  [ ]diuresis   [ ]IABP   [ ]ECMO   [ ]LVAD   [ x]Respiratory Failure    Hemodynamic lability,  instability. Requires IVCD [ ] vasopressors [ ] inotropes  [ ] vasodilator  [ ]IVSS fluid  to maintain MAP, perfusion, C.I.     Thrombocytopenia     Tolerates NG / NJ feeds at [ x] goal rate    [ ] trophic rate    [ ]       rate     Requires bedside physical therapy, mobilization and total residential care.         By signing my name below, I, Rosio Alonzo, attest that this documentation has been prepared under the direction and in the presence of Finn Zelaya MD.   Electronically Signed: Brian Burnett 01-10-23 @ 06:31      Discussed with CT surgeon, Physician Assistant - Nurse Practitioner- Critical care medicine team.   Discussed at  AM / PM rounds.   Chart, labs , films reviewed.    Cumulative Critical Care Time Given Today:  CRITICAL CARE ATTENDING - CTICU    MEDICATIONS  (STANDING):  aMIOdarone    Tablet 200 milliGRAM(s) Oral daily  aspirin  chewable 81 milliGRAM(s) Oral daily  atorvastatin 80 milliGRAM(s) Oral at bedtime  caspofungin IVPB 50 milliGRAM(s) IV Intermittent every 24 hours  caspofungin IVPB      chlorhexidine 2% Cloths 1 Application(s) Topical <User Schedule>  epoetin teena-epbx (RETACRIT) Injectable 5000 Unit(s) IV Push <User Schedule>  heparin   Injectable 5000 Unit(s) SubCutaneous every 8 hours  insulin lispro (ADMELOG) corrective regimen sliding scale   SubCutaneous every 6 hours  insulin NPH human recombinant 7 Unit(s) SubCutaneous every 6 hours  metoprolol tartrate 12.5 milliGRAM(s) Oral every 12 hours  midodrine 20 milliGRAM(s) Oral every 8 hours  multivitamin/minerals/iron Oral Solution (CENTRUM) 15 milliLiter(s) Oral daily  pantoprazole  Injectable 40 milliGRAM(s) IV Push daily  phenylephrine    Infusion 0.287 MICROgram(s)/kG/Min (10 mL/Hr) IV Continuous <Continuous>  polyethylene glycol 3350 17 Gram(s) Oral daily  senna 2 Tablet(s) Oral at bedtime  sevelamer carbonate 1600 milliGRAM(s) Oral every 8 hours  thiamine 100 milliGRAM(s) Enteral Tube daily                        9.0    11.68 )-----------( 134      ( 10 Teo 2023 00:36 )             28.7       01-10    136  |  97  |  38<H>  ----------------------------<  145<H>  4.3   |  26  |  2.81<H>    Ca    9.1      10 Teo 2023 00:36  Phos  4.3     01-10  Mg     2.6     01-10    TPro  7.6  /  Alb  3.6  /  TBili  0.8  /  DBili  x   /  AST  20  /  ALT  12  /  AlkPhos  83  01-10      Daily       01-08 @ 07:01  -  01-09 @ 07:00  --------------------------------------------------------  IN: 1855 mL / OUT: 550 mL / NET: 1305 mL    01-09 @ 07:01  -  01-10 @ 06:31  --------------------------------------------------------  IN: 2934.2 mL / OUT: 3025 mL / NET: -90.8 mL        Critically Ill patient  : [ ] preoperative ,   [x ] post operative    Requires :  [ x] Arterial Line   [ ] Central Line  [ ] PA catheter  [ ] IABP  [ ] ECMO  [ ] LVAD  [ ] Ventilator  [ ] pacemaker [ ] Impella.                      [ x] ABG's     [x ] Pulse Oxymetry Monitoring  Bedside evaluation , monitoring , treatment of hemodynamics , fluids , IVP/ IVCD meds.        Diagnosis:     Cardiogenic Shock / STEMI    Pancreatitis     s/p VA ECMO / IABP - Decannulated     Respiratory Failure     Mitral Clip     Tracheostomy - decannulated yesterday     Renal Failure - Acute Kidney Injury     [ x] Hemodialysis - today  [ ] Hemofiltration:    [ x] negative fluid balance [ ] even fluid balance [ ] positive fluid balance, in a hemodynamically unstable patient.     CHF- acute [ x]   chronic [ ]    systolic [x ]   diastolic [ ]  Valvular [ ]          - Echo- EF -   30%          [ ] RV dysfunction          - Cxr-cardiomegally, edema          - Clinical-  [ ]inotropes   [x]pressors - PO midodrine  [ ]diuresis   [ ]IABP   [ ]ECMO   [ ]LVAD   [ x]Respiratory Failure    Hemodynamic lability,  instability. Requires IVCD [ ] vasopressors [ ] inotropes  [ ] vasodilator  [ ]IVSS fluid  to maintain MAP, perfusion, C.I.     Thrombocytopenia     Tolerates NG / NJ feeds at [ x] goal rate    [ ] trophic rate    [ ]       rate     Requires bedside physical therapy, mobilization and total long-term care.         By signing my name below, I, Rosio Alonzo, attest that this documentation has been prepared under the direction and in the presence of Finn Zelaya MD.   Electronically Signed: Brian Burnett 01-10-23 @ 06:31      Discussed with CT surgeon, Physician Assistant - Nurse Practitioner- Critical care medicine team.   Discussed at  AM / PM rounds.   Chart, labs , films reviewed.    Cumulative Critical Care Time Given Today:  CRITICAL CARE ATTENDING - CTICU    MEDICATIONS  (STANDING):  aMIOdarone    Tablet 200 milliGRAM(s) Oral daily  aspirin  chewable 81 milliGRAM(s) Oral daily  atorvastatin 80 milliGRAM(s) Oral at bedtime  caspofungin IVPB 50 milliGRAM(s) IV Intermittent every 24 hours  caspofungin IVPB      chlorhexidine 2% Cloths 1 Application(s) Topical <User Schedule>  epoetin teena-epbx (RETACRIT) Injectable 5000 Unit(s) IV Push <User Schedule>  heparin   Injectable 5000 Unit(s) SubCutaneous every 8 hours  insulin lispro (ADMELOG) corrective regimen sliding scale   SubCutaneous every 6 hours  insulin NPH human recombinant 7 Unit(s) SubCutaneous every 6 hours  metoprolol tartrate 12.5 milliGRAM(s) Oral every 12 hours  midodrine 20 milliGRAM(s) Oral every 8 hours  multivitamin/minerals/iron Oral Solution (CENTRUM) 15 milliLiter(s) Oral daily  pantoprazole  Injectable 40 milliGRAM(s) IV Push daily  phenylephrine    Infusion 0.287 MICROgram(s)/kG/Min (10 mL/Hr) IV Continuous <Continuous>  polyethylene glycol 3350 17 Gram(s) Oral daily  senna 2 Tablet(s) Oral at bedtime  sevelamer carbonate 1600 milliGRAM(s) Oral every 8 hours  thiamine 100 milliGRAM(s) Enteral Tube daily                        9.0    11.68 )-----------( 134      ( 10 Teo 2023 00:36 )             28.7       01-10    136  |  97  |  38<H>  ----------------------------<  145<H>  4.3   |  26  |  2.81<H>    Ca    9.1      10 Eto 2023 00:36  Phos  4.3     01-10  Mg     2.6     01-10    TPro  7.6  /  Alb  3.6  /  TBili  0.8  /  DBili  x   /  AST  20  /  ALT  12  /  AlkPhos  83  01-10      Daily       01-08 @ 07:01  -  01-09 @ 07:00  --------------------------------------------------------  IN: 1855 mL / OUT: 550 mL / NET: 1305 mL    01-09 @ 07:01  -  01-10 @ 06:31  --------------------------------------------------------  IN: 2934.2 mL / OUT: 3025 mL / NET: -90.8 mL        Critically Ill patient  : [ ] preoperative ,   [x ] post operative    Requires :  [ x] Arterial Line   [ ] Central Line  [ ] PA catheter  [ ] IABP  [ ] ECMO  [ ] LVAD  [ ] Ventilator  [ ] pacemaker [ ] Impella.                      [ x] ABG's     [x ] Pulse Oxymetry Monitoring  Bedside evaluation , monitoring , treatment of hemodynamics , fluids , IVP/ IVCD meds.        Diagnosis:     Cardiogenic Shock / STEMI    Pancreatitis     s/p VA ECMO / IABP - Decannulated     Respiratory Failure  - resolved    Mitral Clip     Tracheostomy - decannulated      Renal Failure - Acute Kidney Injury     [ x] Hemodialysis - today  [ ] Hemofiltration:    [ x] negative fluid balance [ ] even fluid balance, in a hemodynamically unstable patient.     CHF- acute [ x]   chronic [ ]    systolic [x ]   diastolic [ ]  Valvular [ ]          - Echo- EF -   30%          [ ] RV dysfunction          - Cxr-cardiomegally, edema          - Clinical-  [ ]inotropes   [x]pressors - PO midodrine / Donny   [ ]diuresis   [ ]IABP   [ ]ECMO   [ ]LVAD   [ x]Respiratory Failure    Hypotension     Hemodynamic lability,  instability. Requires IVCD [ x] vasopressors [ ] inotropes  [ ] vasodilator  [ ]IVSS fluid  to maintain MAP, perfusion, C.I.     Thrombocytopenia     Tolerates NG / NJ feeds at [ x] goal rate    [ ] trophic rate    [ ]       rate     Requires bedside physical therapy, mobilization and total care home care.     candida in Blood x 1        By signing my name below, I, Rosio Alonzo, attest that this documentation has been prepared under the direction and in the presence of Finn Zelaya MD.   Electronically Signed: Brian Burnett 01-10-23 @ 06:31    Finn MANN, personally performed the services described in this documentation. All medical record entries made by the scribe were at my direction and in my presence. I have reviewed the chart and agree that the record reflects my personal performance and is accurate and complete.   Finn Zelaya MD.       Discussed with CT surgeon, Physician Assistant - Nurse Practitioner- Critical care medicine team.   Discussed at  AM / PM rounds.   Chart, labs , films reviewed.    Cumulative Critical Care Time Given Today:  30 min CRITICAL CARE ATTENDING - CTICU    MEDICATIONS  (STANDING):  aMIOdarone    Tablet 200 milliGRAM(s) Oral daily  aspirin  chewable 81 milliGRAM(s) Oral daily  atorvastatin 80 milliGRAM(s) Oral at bedtime  caspofungin IVPB 50 milliGRAM(s) IV Intermittent every 24 hours  caspofungin IVPB      chlorhexidine 2% Cloths 1 Application(s) Topical <User Schedule>  epoetin teena-epbx (RETACRIT) Injectable 5000 Unit(s) IV Push <User Schedule>  heparin   Injectable 5000 Unit(s) SubCutaneous every 8 hours  insulin lispro (ADMELOG) corrective regimen sliding scale   SubCutaneous every 6 hours  insulin NPH human recombinant 7 Unit(s) SubCutaneous every 6 hours  metoprolol tartrate 12.5 milliGRAM(s) Oral every 12 hours  midodrine 20 milliGRAM(s) Oral every 8 hours  multivitamin/minerals/iron Oral Solution (CENTRUM) 15 milliLiter(s) Oral daily  pantoprazole  Injectable 40 milliGRAM(s) IV Push daily  phenylephrine    Infusion 0.287 MICROgram(s)/kG/Min (10 mL/Hr) IV Continuous <Continuous>  polyethylene glycol 3350 17 Gram(s) Oral daily  senna 2 Tablet(s) Oral at bedtime  sevelamer carbonate 1600 milliGRAM(s) Oral every 8 hours  thiamine 100 milliGRAM(s) Enteral Tube daily                        9.0    11.68 )-----------( 134      ( 10 Teo 2023 00:36 )             28.7       01-10    136  |  97  |  38<H>  ----------------------------<  145<H>  4.3   |  26  |  2.81<H>    Ca    9.1      10 Teo 2023 00:36  Phos  4.3     01-10  Mg     2.6     01-10    TPro  7.6  /  Alb  3.6  /  TBili  0.8  /  DBili  x   /  AST  20  /  ALT  12  /  AlkPhos  83  01-10      Daily       01-08 @ 07:01  -  01-09 @ 07:00  --------------------------------------------------------  IN: 1855 mL / OUT: 550 mL / NET: 1305 mL    01-09 @ 07:01  -  01-10 @ 06:31  --------------------------------------------------------  IN: 2934.2 mL / OUT: 3025 mL / NET: -90.8 mL        Critically Ill patient  : [ ] preoperative ,   [x ] post operative    Requires :  [ x] Arterial Line   [ ] Central Line  [ ] PA catheter  [ ] IABP  [ ] ECMO  [ ] LVAD  [ ] Ventilator  [ ] pacemaker [ ] Impella.                      [ x] ABG's     [x ] Pulse Oxymetry Monitoring  Bedside evaluation , monitoring , treatment of hemodynamics , fluids , IVP/ IVCD meds.        Diagnosis:     Cardiogenic Shock / STEMI    Pancreatitis     s/p VA ECMO / IABP - Decannulated     Respiratory Failure  - resolved    Mitral Clip     Tracheostomy - decannulated      Renal Failure - Acute Kidney Injury     [ x] Hemodialysis - today  [ ] Hemofiltration:    [ x] negative fluid balance [ ] even fluid balance, in a hemodynamically unstable patient.     CHF- acute [ x]   chronic [ ]    systolic [x ]   diastolic [ ]  Valvular [ ]          - Echo- EF -   30%          [ ] RV dysfunction          - Cxr-cardiomegally, edema          - Clinical-  [ ]inotropes   [x]pressors - PO midodrine / Donny   [ ]diuresis   [ ]IABP   [ ]ECMO   [ ]LVAD   [ x]Respiratory Failure    Hypotension     Hemodynamic lability,  instability. Requires IVCD [ x] vasopressors [ ] inotropes  [ ] vasodilator  [ ]IVSS fluid  to maintain MAP, perfusion, C.I.     Thrombocytopenia     Tolerates NG / NJ feeds at [ x] goal rate    [ ] trophic rate    [ ]       rate     Requires bedside physical therapy, mobilization and total [facility] care.     candida in Blood x 1        By signing my name below, I, Rosio Alonzo, attest that this documentation has been prepared under the direction and in the presence of Finn Zelaya MD.   Electronically Signed: Brian Burnett 01-10-23 @ 06:31    Finn MANN, personally performed the services described in this documentation. All medical record entries made by the scribe were at my direction and in my presence. I have reviewed the chart and agree that the record reflects my personal performance and is accurate and complete.   Finn Zelaya MD.       Discussed with CT surgeon, Physician Assistant - Nurse Practitioner- Critical care medicine team.   Discussed at  AM / PM rounds.   Chart, labs , films reviewed.    Cumulative Critical Care Time Given Today:  30 min

## 2023-01-10 NOTE — SWALLOW BEDSIDE ASSESSMENT ADULT - SLP PERTINENT HISTORY OF CURRENT PROBLEM
61 y/o M with PMH of CABG, DM, HTN, and HLD presenting 11/24/22 as transfer from [place] for c/f STEMI. Patient had increasing troponins throughout the day and worsening respiratory status with rising lactate. Patient went into cardiac arrest 11/24 and ACLS was started. CICU was consulted due to frothy oral secretions during the arrest. Intubated, placed on ECMO 11/25. Nsx consulted. CTH 4mm tentorial SDH. Intubated, perrl, FC x 4 w/ sedation held. No nsgy intervention. GI consulted due to concern for gallstone pancreatitis. Unable to undergo MRCP while on ECMO. Palliative Care following. Nephrology following for ERIC. s/p ECMO removal 12/8. ENT consulted 12/8 for soft palate laceration. Neuro consulted 12/12. Impression: Acute change in mentation and functional status with R gaze preference, generalized shivering, not following commands, concerning for possible acute ischemic event vs encephalopathy vs seizure in the setting of critically ill patient with multiple comorbidities. 61 y/o M with PMH of CABG, DM, HTN, and HLD presenting 11/24/22 as transfer from [place] for c/f STEMI. Patient had increasing troponins throughout the day and worsening respiratory status with rising lactate. Patient went into cardiac arrest 11/24 and ACLS was started. CICU was consulted due to frothy oral secretions during the arrest. Intubated, placed on ECMO 11/25. Nsx consulted. CTH 4mm tentorial SDH. Intubated, perrl, FC x 4 w/ sedation held. No nsgy intervention. GI consulted due to concern for gallstone pancreatitis. Unable to undergo MRCP while on ECMO. Palliative Care following. Nephrology following for ERIC. s/p ECMO removal 12/8. ENT consulted 12/8 for soft palate laceration. Neuro consulted 12/12. Impression: Acute change in mentation and functional status with R gaze preference, generalized shivering, not following commands, concerning for possible acute ischemic event vs encephalopathy vs seizure in the setting of critically ill patient with multiple comorbidities. 63 y/o M with PMH of CABG, DM, HTN, and HLD presenting 11/24/22 as transfer from [place] for c/f STEMI. Patient had increasing troponins throughout the day and worsening respiratory status with rising lactate. Patient went into cardiac arrest 11/24 and ACLS was started. CICU was consulted due to frothy oral secretions during the arrest. Intubated, placed on ECMO 11/25. Nsx consulted. CTH 4mm tentorial SDH. Intubated, perrl, FC x 4 w/ sedation held. No nsgy intervention. GI consulted due to concern for gallstone pancreatitis. Unable to undergo MRCP while on ECMO. Palliative Care following. Nephrology following for ERIC. s/p ECMO removal 12/8. ENT consulted 12/8 for soft palate laceration. Neuro consulted 12/12. Impression: Acute change in mentation and functional status with R gaze preference, generalized shivering, not following commands, concerning for possible acute ischemic event vs encephalopathy vs seizure in the setting of critically ill patient with multiple comorbidities.

## 2023-01-10 NOTE — SWALLOW FEES ASSESSMENT ADULT - ROSENBEK'S PENETRATION ASPIRATION SCALE
(1) no aspiration, material does not enter airway (6) material passes glottis, no subglottic residue remains (aspiration)

## 2023-01-10 NOTE — SWALLOW BEDSIDE ASSESSMENT ADULT - SPECIFY REASON(S)
to clinically evaluate pt's bayron-pharyngeal swallow mechanism. Per consult, "gallito decannulated 1/8."

## 2023-01-10 NOTE — PROGRESS NOTE ADULT - ASSESSMENT
61 YO M with a history of CAD s/p 4v CABG ~2019 in [facility], DM2 (A1c 7.3%), and HTN who initially presented to OSH with abdominal pain and n/v and found to have pancreatitis with lipase > 3000 though also with elevated troponins. CT abdomen revealed acute pancreatitis and his initial LVEF was 40-45%. He developed MSOF with hypoxic respiratory failure requiring intubation and ERIC and went into hypoxic PEA arrest requiring ACLS and due to persistent hypoxia and hypotension on pressors after ROSC was cannulated to [facility]O (LFV, RFA, no anterograde perfusion catheter) on 11/25. Notably CTH that day revealed small subdural hemorrhage.     His post arrest TTE on 11/26 showed a signficantly worse LVEF of 5-10% with an AV that was only minimally opening. Since then he has had improvement in his LV function to ~35-40% and improved pulsatility while tolerating progressive slow ECMO weans. ECMO turndown (note in chart 11/30) was reassuring for ability to decannulate to IABP/inotropes. LHC 12/06 showed closure of his 3 vein grafts with graft to LAD patent though with distal native disease. No coronary intervention was done. IABP placed, ECMO successfully decannulated 12/08 (transfused 2u pRBCs during).     His ARDS has improved and he's now s/p trach (decannulated 1/8). He's now off IABP, removed 12/17, and inotropes, however has previously been unable to tolerate GDMT due to soft BPs and renal failure requiring dialysis. 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 He's afebrile with improving leukocytosis. He has urine output but not enough to stay euvolemic. He's tolerating intermittent HD with midodrine for BP support.     Previous cardiac studies:  TTE 1/9/23 - EF 25%, mild MR, min AI, no TR, no obvious valvular vegetations.  TTE 12/19/22 - EF 24%, LVIDd 5.6, normal RV function, estimated pasp 45mmhg  LHC (12/06/22) - patent LIMA-LAD, RCA , LCx , aortogram w/ closure of 3 vein grafts, LVEDP 22 mm Hg, left-to-left and left-to-right collaterals  TTE (12/03/22) - mod-to-sev segmental LVSD (inferolateral, inferior, inferoseptal hypokinesis) 61 YO M with a history of CAD s/p 4v CABG ~2019 in [facility], DM2 (A1c 7.3%), and HTN who initially presented to OSH with abdominal pain and n/v and found to have pancreatitis with lipase > 3000 though also with elevated troponins. CT abdomen revealed acute pancreatitis and his initial LVEF was 40-45%. He developed MSOF with hypoxic respiratory failure requiring intubation and ERIC and went into hypoxic PEA arrest requiring ACLS and due to persistent hypoxia and hypotension on pressors after ROSC was cannulated to [facility] ECMO (LFV, RFA, no anterograde perfusion catheter) on 11/25. Notably CTH that day revealed small subdural hemorrhage.     His post arrest TTE on 11/26 showed a signficantly worse LVEF of 5-10% with an AV that was only minimally opening. Since then he has had improvement in his LV function to ~35-40% and improved pulsatility while tolerating progressive slow ECMO weans. ECMO turndown (note in chart 11/30) was reassuring for ability to decannulate to IABP/inotropes. LHC 12/06 showed closure of his 3 vein grafts with graft to LAD patent though with distal native disease. No coronary intervention was done. IABP placed, ECMO successfully decannulated 12/08 (transfused 2u pRBCs during).     His ARDS has improved and he's now s/p trach (decannulated 1/8). He's now off IABP, removed 12/17, and inotropes, however has previously been unable to tolerate GDMT due to soft BPs and renal failure requiring dialysis. 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 He's afebrile with improving leukocytosis. He has urine output but not enough to stay euvolemic. He's tolerating intermittent HD with midodrine for BP support.     Previous cardiac studies:  TTE 1/9/23 - EF 25%, mild MR, min AI, no TR, no obvious valvular vegetations.  TTE 12/19/22 - EF 24%, LVIDd 5.6, normal RV function, estimated pasp 45mmhg  LHC (12/06/22) - patent LIMA-LAD, RCA , LCx , aortogram w/ closure of 3 vein grafts, LVEDP 22 mm Hg, left-to-left and left-to-right collaterals  TTE (12/03/22) - mod-to-sev segmental LVSD (inferolateral, inferior, inferoseptal hypokinesis) 63 YO M with a history of CAD s/p 4v CABG ~2019 in [facility], DM2 (A1c 7.3%), and HTN who initially presented to OSH with abdominal pain and n/v and found to have pancreatitis with lipase > 3000 though also with elevated troponins. CT abdomen revealed acute pancreatitis and his initial LVEF was 40-45%. He developed MSOF with hypoxic respiratory failure requiring intubation and ERIC and went into hypoxic PEA arrest requiring ACLS and due to persistent hypoxia and hypotension on pressors after ROSC was cannulated to [facility] ECMO (LFV, RFA, no anterograde perfusion catheter) on 11/25. Notably CTH that day revealed small subdural hemorrhage.     His post arrest TTE on 11/26 showed a signficantly worse LVEF of 5-10% with an AV that was only minimally opening. Since then he has had improvement in his LV function to ~35-40% and improved pulsatility while tolerating progressive slow ECMO weans. ECMO turndown (note in chart 11/30) was reassuring for ability to decannulate to IABP/inotropes. LHC 12/06 showed closure of his 3 vein grafts with graft to LAD patent though with distal native disease. No coronary intervention was done. IABP placed, ECMO successfully decannulated 12/08 (transfused 2u pRBCs during).     His ARDS has improved and he's now s/p trach (decannulated 1/8). He's now off IABP, removed 12/17, and inotropes, however has previously been unable to tolerate GDMT due to soft BPs and renal failure requiring dialysis. 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 He's afebrile with improving leukocytosis. He has urine output but not enough to stay euvolemic. He's tolerating intermittent HD with midodrine for BP support.     Previous cardiac studies:  TTE 1/9/23 - EF 25%, mild MR, min AI, no TR, no obvious valvular vegetations.  TTE 12/19/22 - EF 24%, LVIDd 5.6, normal RV function, estimated pasp 45mmhg  LHC (12/06/22) - patent LIMA-LAD, RCA , LCx , aortogram w/ closure of 3 vein grafts, LVEDP 22 mm Hg, left-to-left and left-to-right collaterals  TTE (12/03/22) - mod-to-sev segmental LVSD (inferolateral, inferior, inferoseptal hypokinesis)

## 2023-01-10 NOTE — CHART NOTE - NSCHARTNOTEFT_GEN_A_CORE
Nutrition Follow Up Note  Patient seen for: CTU follow up     Chart reviewed, events noted. Pt is a 63 y/o male with PMH of CABG, DM, HTN, HLD presenting as transfer from [place] with STEMI. Cardiogenic shock s/p [facility] ECMO decannulated . Mitral regurgitation s/p Mitral clip x1 22. Acute respiratory distress/failure s/p Tracheostomy 22. IABP placed (IABP dc'ed ).  s/p trach decannulation 23    Source: [x] Patient       [x] EMR        [] RN        [x] Family at bedside - daughter      [] Other:    -If unable to interview patient: [] Trach/Vent/BiPAP  [] Disoriented/confused/inappropriate to interview    Nutrition-Related Events:   - Pressors:  [] Yes    [] No   - Propofol:  [] Yes    [] No         - Rate: __mL/hr. If maintained x 24 hours, propofol will provide:     Diet Order:   Diet, NPO with Tube Feed:   Tube Feeding Modality: Nasogastric  Glucerna 1.2 Johnathon (GLUCERNARTH)  Total Volume for 24 Hours (mL): 1560  Continuous  Starting Tube Feed Rate {mL per Hour}: 65  Increase Tube Feed Rate by (mL): 10     Every hour  Until Goal Tube Feed Rate (mL per Hour): 65  Tube Feed Duration (in Hours): 24  Tube Feed Start Time: 14:25  No Carb Prosource (1pkg = 15gms Protein)     Qty per Day:  2 (22)      EN Order Provides:    - Total volume:    - Kcal:       ( ___kcal/kg based on __)    - Gm Protein  ( __ g/kg based on __)    - mL free water:  Protein Modular(s) Provides:  Current Pump Rate:  EN provision: ___% EN volume goal provided in past __ days    Is current diet order appropriate/adequate? [] Yes  []  No:     PO intake :   [] >75%  Adequate    [] 50-75%  Fair       [] <50%  Poor    Nutrition-related concerns:    GI:  Last BM ___.   Bowel Regimen? [] Yes   [] No  NGT Output:  IV Fluids:  Ostomy Output:  Fistula Output:     Weights:   Daily Weight in k.3 (), Weight in k.3 (), Weight in k.8 (), Weight in k.8 (), Weight in k.7 (), Weight in k.7 (), Weight in k.4 ()    MEDICATIONS  (STANDING):  aMIOdarone    Tablet  atorvastatin  caspofungin IVPB  caspofungin IVPB  insulin lispro (ADMELOG) corrective regimen sliding scale  insulin NPH human recombinant  metoprolol tartrate  midodrine  multivitamin/minerals/iron Oral Solution (CENTRUM)  pantoprazole  Injectable  polyethylene glycol 3350  senna  thiamine    Pertinent Labs: 01-10 @ 00:36: Na 136, BUN 38<H>, Cr 2.81<H>, <H>, K+ 4.3, Phos 4.3, Mg 2.6, Alk Phos 83, ALT/SGPT 12, AST/SGOT 20, HbA1c --    A1C with Estimated Average Glucose Result: 7.3 % (22 @ 03:58)    Finger Sticks:  POCT Blood Glucose.: 150 mg/dL (01-10 @ 05:23)  POCT Blood Glucose.: 127 mg/dL ( @ 17:17)  POCT Blood Glucose.: 165 mg/dL ( @ 11:49)        Skin per nursing documentation:   Edema:     Estimated Energy Needs:  Estimated Protein Needs:  Estimated Fluid Needs:   [place] State Equation:    Previous Nutrition Diagnosis:   Nutrition Diagnosis is: [] ongoing  [] resolved [] not applicable     New Nutrition Diagnosis: [] Not applicable    Nutrition Care Plan:  [] In Progress  [] Achieved  [] Not applicable    Nutrition Interventions:     Education Provided:       [] Yes:  [] No:        Recommendations:         [] Continue current diet order            [] Add oral nutrition supplement:     [] Discontinue current diet order. Recommend:      [] Add micronutrient supplementation:      [] Continue current micronutrient supplementation:      [] Other:     Monitoring and Evaluation:   Continue to monitor nutritional intake, tolerance to diet prescription, weights, labs, skin integrity      RD remains available upon request and will follow up per protocol Nutrition Follow Up Note  Patient seen for: CTU follow up     Chart reviewed, events noted. Pt is a 61 y/o male with PMH of CABG, DM, HTN, HLD presenting as transfer from [place] with STEMI. Cardiogenic shock s/p VA ECMO decannulated . Mitral regurgitation s/p Mitral clip x1 22. Acute respiratory distress/failure s/p Tracheostomy 22. IABP placed (IABP dc'ed ).  s/p trach decannulation 23    Source: [x] Patient       [x] EMR        [] RN        [x] Family at bedside - daughter      [] Other:    -If unable to interview patient: [] Trach/Vent/BiPAP  [] Disoriented/confused/inappropriate to interview    Nutrition-Related Events:   - Pressors:  [] Yes    [] No   - Propofol:  [] Yes    [] No         - Rate: __mL/hr. If maintained x 24 hours, propofol will provide:     Diet Order:   Diet, NPO with Tube Feed:   Tube Feeding Modality: Nasogastric  Glucerna 1.2 Johnathon (GLUCERNARTH)  Total Volume for 24 Hours (mL): 1560  Continuous  Starting Tube Feed Rate {mL per Hour}: 65  Increase Tube Feed Rate by (mL): 10     Every hour  Until Goal Tube Feed Rate (mL per Hour): 65  Tube Feed Duration (in Hours): 24  Tube Feed Start Time: 14:25  No Carb Prosource (1pkg = 15gms Protein)     Qty per Day:  2 (22)      EN Order Provides:    - Total volume:    - Kcal:       ( ___kcal/kg based on __)    - Gm Protein  ( __ g/kg based on __)    - mL free water:  Protein Modular(s) Provides:  Current Pump Rate:  EN provision: ___% EN volume goal provided in past __ days    Is current diet order appropriate/adequate? [] Yes  []  No:     PO intake :   [] >75%  Adequate    [] 50-75%  Fair       [] <50%  Poor    Nutrition-related concerns:    GI:  Last BM ___.   Bowel Regimen? [] Yes   [] No  NGT Output:  IV Fluids:  Ostomy Output:  Fistula Output:     Weights:   Daily Weight in k.3 (), Weight in k.3 (), Weight in k.8 (), Weight in k.8 (), Weight in k.7 (), Weight in k.7 (), Weight in k.4 ()    MEDICATIONS  (STANDING):  aMIOdarone    Tablet  atorvastatin  caspofungin IVPB  caspofungin IVPB  insulin lispro (ADMELOG) corrective regimen sliding scale  insulin NPH human recombinant  metoprolol tartrate  midodrine  multivitamin/minerals/iron Oral Solution (CENTRUM)  pantoprazole  Injectable  polyethylene glycol 3350  senna  thiamine    Pertinent Labs: 01-10 @ 00:36: Na 136, BUN 38<H>, Cr 2.81<H>, <H>, K+ 4.3, Phos 4.3, Mg 2.6, Alk Phos 83, ALT/SGPT 12, AST/SGOT 20, HbA1c --    A1C with Estimated Average Glucose Result: 7.3 % (22 @ 03:58)    Finger Sticks:  POCT Blood Glucose.: 150 mg/dL (01-10 @ 05:23)  POCT Blood Glucose.: 127 mg/dL ( @ 17:17)  POCT Blood Glucose.: 165 mg/dL ( @ 11:49)        Skin per nursing documentation:   Edema:     Estimated Energy Needs:  Estimated Protein Needs:  Estimated Fluid Needs:   [place] State Equation:    Previous Nutrition Diagnosis:   Nutrition Diagnosis is: [] ongoing  [] resolved [] not applicable     New Nutrition Diagnosis: [] Not applicable    Nutrition Care Plan:  [] In Progress  [] Achieved  [] Not applicable    Nutrition Interventions:     Education Provided:       [] Yes:  [] No:        Recommendations:         [] Continue current diet order            [] Add oral nutrition supplement:     [] Discontinue current diet order. Recommend:      [] Add micronutrient supplementation:      [] Continue current micronutrient supplementation:      [] Other:     Monitoring and Evaluation:   Continue to monitor nutritional intake, tolerance to diet prescription, weights, labs, skin integrity      RD remains available upon request and will follow up per protocol Nutrition Follow Up Note  Patient seen for: CTU follow up     Chart reviewed, events noted. Pt is a 63 y/o male with PMH of CABG, DM, HTN, HLD presenting as transfer from [place] with STEMI. Cardiogenic shock s/p VA ECMO decannulated . Mitral regurgitation s/p Mitral clip x1 22. Acute respiratory distress/failure s/p Tracheostomy 22. IABP placed (IABP dc'ed ).  s/p trach decannulation 23    Source: [x] Patient       [x] EMR        [] RN        [x] Family at bedside - daughter      [] Other:    -If unable to interview patient: [] Trach/Vent/BiPAP  [] Disoriented/confused/inappropriate to interview    Nutrition-Related Events:   - Pressors:  [] Yes    [] No   - Propofol:  [] Yes    [] No         - Rate: __mL/hr. If maintained x 24 hours, propofol will provide:     Diet Order:   Diet, NPO with Tube Feed:   Tube Feeding Modality: Nasogastric  Glucerna 1.2 Johnathon (GLUCERNARTH)  Total Volume for 24 Hours (mL): 1560  Continuous  Starting Tube Feed Rate {mL per Hour}: 65  Increase Tube Feed Rate by (mL): 10     Every hour  Until Goal Tube Feed Rate (mL per Hour): 65  Tube Feed Duration (in Hours): 24  Tube Feed Start Time: 14:25  No Carb Prosource (1pkg = 15gms Protein)     Qty per Day:  2 (22)      EN Order Provides:    - Total volume:    - Kcal:       ( ___kcal/kg based on __)    - Gm Protein  ( __ g/kg based on __)    - mL free water:  Protein Modular(s) Provides:  Current Pump Rate:  EN provision: ___% EN volume goal provided in past __ days    Is current diet order appropriate/adequate? [] Yes  []  No:     PO intake :   [] >75%  Adequate    [] 50-75%  Fair       [] <50%  Poor    Nutrition-related concerns:    GI:  Last BM ___.   Bowel Regimen? [] Yes   [] No  NGT Output:  IV Fluids:  Ostomy Output:  Fistula Output:     Weights:   Daily Weight in k.3 (), Weight in k.3 (), Weight in k.8 (), Weight in k.8 (), Weight in k.7 (), Weight in k.7 (), Weight in k.4 ()    MEDICATIONS  (STANDING):  aMIOdarone    Tablet  atorvastatin  caspofungin IVPB  caspofungin IVPB  insulin lispro (ADMELOG) corrective regimen sliding scale  insulin NPH human recombinant  metoprolol tartrate  midodrine  multivitamin/minerals/iron Oral Solution (CENTRUM)  pantoprazole  Injectable  polyethylene glycol 3350  senna  thiamine    Pertinent Labs: 01-10 @ 00:36: Na 136, BUN 38<H>, Cr 2.81<H>, <H>, K+ 4.3, Phos 4.3, Mg 2.6, Alk Phos 83, ALT/SGPT 12, AST/SGOT 20, HbA1c --    A1C with Estimated Average Glucose Result: 7.3 % (22 @ 03:58)    Finger Sticks:  POCT Blood Glucose.: 150 mg/dL (01-10 @ 05:23)  POCT Blood Glucose.: 127 mg/dL ( @ 17:17)  POCT Blood Glucose.: 165 mg/dL ( @ 11:49)        Skin per nursing documentation:   Edema:     Estimated Energy Needs:  Estimated Protein Needs:  Estimated Fluid Needs:   [place] State Equation:    Previous Nutrition Diagnosis:   Nutrition Diagnosis is: [] ongoing  [] resolved [] not applicable     New Nutrition Diagnosis: [] Not applicable    Nutrition Care Plan:  [] In Progress  [] Achieved  [] Not applicable    Nutrition Interventions:     Education Provided:       [] Yes:  [] No:        Recommendations:         [] Continue current diet order            [] Add oral nutrition supplement:     [] Discontinue current diet order. Recommend:      [] Add micronutrient supplementation:      [] Continue current micronutrient supplementation:      [] Other:     Monitoring and Evaluation:   Continue to monitor nutritional intake, tolerance to diet prescription, weights, labs, skin integrity      RD remains available upon request and will follow up per protocol Nutrition Follow Up Note  Patient seen for: CTU follow up     Chart reviewed, events noted. Pt is a 61 y/o male with PMH of CABG, DM, HTN, HLD presenting as transfer from [place] with STEMI. Cardiogenic shock s/p VA ECMO decannulated . Mitral regurgitation s/p Mitral clip x1 22. Acute respiratory distress/failure s/p Tracheostomy 22. IABP placed (IABP dc'ed ).  s/p trach decannulation 23    Source: [x] Patient       [x] EMR        [] RN        [x] Family at bedside - daughter      [] Other:    -If unable to interview patient: [] Trach/Vent/BiPAP  [] Disoriented/confused/inappropriate to interview    Diet Order:   Diet, NPO with Tube Feed:   Tube Feeding Modality: Nasogastric  Glucerna 1.2 Johnathon (GLUCERNARTH)  Total Volume for 24 Hours (mL): 1560  Continuous  Starting Tube Feed Rate {mL per Hour}: 65  Increase Tube Feed Rate by (mL): 10     Every hour  Until Goal Tube Feed Rate (mL per Hour): 65  Tube Feed Duration (in Hours): 24  Tube Feed Start Time: 14:25  No Carb Prosource (1pkg = 15gms Protein)     Qty per Day:  2 (22)    EN Order with prosource provides: 1912kcal (25kcal/kg IBW + 10% - 77kg), 104g protein (1.35g/kg IBW + 10% - 77kg) and 1255ml free water  Current Pump Rate: 65ml  EN provision: 95% EN volume goal provided in past 5 days per RN documentation     Is current diet order appropriate/adequate? see below for recommendations  Pt wishing to eat by mouth. deferred advancement of diet to SLP, pt understands. reports no GI symptoms on current tube feed regimen.     Nutrition-related concerns:  -remains on insulin regimen for glycemic control   -BUN/Cr elevated. Nephrology following: "Pt with non oliguric ERIC/ ATN in the setting of STEMI, cardiac arrest & cardiogenic shock." Pt initiated on CRRT/CVVHDF 22. CRRT overnight on 22 for volume overload, stopped again . Getting daily HD/UF.  -Mariana ordered     GI:  Last BM 1/10 per flow sheets.  Bowel Regimen? [x] Yes senna and miralax  [] No    Weights:   Daily Weight in k.3 (), Weight in k.3 (), Weight in k.8 (), Weight in k.8 (), Weight in k.7 (), Weight in k.7 (), Weight in k.4 (),  Weight in k.6 ()  Weight fluctuations noted in hospital. likely due to fluid shifts. RD will continue to monitor trends.     MEDICATIONS  (STANDING):  aMIOdarone    Tablet  atorvastatin  caspofungin IVPB  caspofungin IVPB  insulin lispro (ADMELOG) corrective regimen sliding scale  insulin NPH human recombinant  metoprolol tartrate  midodrine  multivitamin/minerals/iron Oral Solution (CENTRUM)  pantoprazole  Injectable  polyethylene glycol 3350  senna  thiamine    Pertinent Labs: 01-10 @ 00:36: Na 136, BUN 38<H>, Cr 2.81<H>, <H>, K+ 4.3, Phos 4.3, Mg 2.6, Alk Phos 83, ALT/SGPT 12, AST/SGOT 20, HbA1c --    A1C with Estimated Average Glucose Result: 7.3 % (22 @ 03:58)    Finger Sticks:  POCT Blood Glucose.: 150 mg/dL (01-10 @ 05:23)  POCT Blood Glucose.: 127 mg/dL ( @ 17:17)  POCT Blood Glucose.: 165 mg/dL ( @ 11:49)    Skin per nursing documentation: No noted pressure injuries as per documentation. R groin wound + wound VAC   Edema: none noted per flow sheets     Estimated Energy Needs: 2310 - 2695kcal (27-32kcal/kg)  Estimated Protein Needs: 100 - 115g protein (1.3-1.5g/kg)  Defer fluids to team.   Adjusted based on IBW + 10% 77kg     Previous Nutrition Diagnosis:  Severe acute malnutrition & Increased nutrient needs  Nutrition Diagnosis is: [x] ongoing  [] resolved [] not applicable     New Nutrition Diagnosis: [x] Not applicable    Nutrition Care Plan:  [x] In Progress  [] Achieved  [] Not applicable    Nutrition Interventions:     Education Provided:       [] Yes:  [x] No: not warranted at this time    Recommendations:      -Speech Pathologist consult pending. Should tube feed continue, consider changing EN regimen to better meet needs. Consider Glucerna 1.5 60ml/hr x 24 hours to provide a total volume of 1440ml, 2160kcal (28kcal/kg), 118g protein (1.5g/kg) and 1092ml free water. Defer free water flush to team.   -If diet advanced, consider consistent carbohydrate diet. If PO intake adequate, would add renal restriction as well should HD continue.  -Consider changing Multivitamin to nephrovite. Consider addition of vitamin C to aid in wound healing. Also, a daily dose of >/= 500mg vitamin C has been shown to lower elevated blood glucose levels in people with diabetes. Can continue thiamin per team.       Monitoring and Evaluation:   Continue to monitor nutritional intake, tolerance to diet prescription, weights, labs, skin integrity    RD remains available upon request and will follow up per protocol  Teri Abdi MS, RD, CDN #[phone] Nutrition Follow Up Note  Patient seen for: CTU follow up     Chart reviewed, events noted. Pt is a 61 y/o male with PMH of CABG, DM, HTN, HLD presenting as transfer from [place] with STEMI. Cardiogenic shock s/p VA ECMO decannulated . Mitral regurgitation s/p Mitral clip x1 22. Acute respiratory distress/failure s/p Tracheostomy 22. IABP placed (IABP dc'ed ).  s/p trach decannulation 23    Source: [x] Patient       [x] EMR        [] RN        [x] Family at bedside - daughter      [] Other:    -If unable to interview patient: [] Trach/Vent/BiPAP  [] Disoriented/confused/inappropriate to interview    Diet Order:   Diet, NPO with Tube Feed:   Tube Feeding Modality: Nasogastric  Glucerna 1.2 Johnathon (GLUCERNARTH)  Total Volume for 24 Hours (mL): 1560  Continuous  Starting Tube Feed Rate {mL per Hour}: 65  Increase Tube Feed Rate by (mL): 10     Every hour  Until Goal Tube Feed Rate (mL per Hour): 65  Tube Feed Duration (in Hours): 24  Tube Feed Start Time: 14:25  No Carb Prosource (1pkg = 15gms Protein)     Qty per Day:  2 (22)    EN Order with prosource provides: 1912kcal (25kcal/kg IBW + 10% - 77kg), 104g protein (1.35g/kg IBW + 10% - 77kg) and 1255ml free water  Current Pump Rate: 65ml  EN provision: 95% EN volume goal provided in past 5 days per RN documentation     Is current diet order appropriate/adequate? see below for recommendations  Pt wishing to eat by mouth. deferred advancement of diet to SLP, pt understands. reports no GI symptoms on current tube feed regimen.     Nutrition-related concerns:  -remains on insulin regimen for glycemic control   -BUN/Cr elevated. Nephrology following: "Pt with non oliguric ERIC/ ATN in the setting of STEMI, cardiac arrest & cardiogenic shock." Pt initiated on CRRT/CVVHDF 22. CRRT overnight on 22 for volume overload, stopped again . Getting daily HD/UF.  -Mariana ordered     GI:  Last BM 1/10 per flow sheets.  Bowel Regimen? [x] Yes senna and miralax  [] No    Weights:   Daily Weight in k.3 (), Weight in k.3 (), Weight in k.8 (), Weight in k.8 (), Weight in k.7 (), Weight in k.7 (), Weight in k.4 (),  Weight in k.6 ()  Weight fluctuations noted in hospital. likely due to fluid shifts. RD will continue to monitor trends.     MEDICATIONS  (STANDING):  aMIOdarone    Tablet  atorvastatin  caspofungin IVPB  caspofungin IVPB  insulin lispro (ADMELOG) corrective regimen sliding scale  insulin NPH human recombinant  metoprolol tartrate  midodrine  multivitamin/minerals/iron Oral Solution (CENTRUM)  pantoprazole  Injectable  polyethylene glycol 3350  senna  thiamine    Pertinent Labs: 01-10 @ 00:36: Na 136, BUN 38<H>, Cr 2.81<H>, <H>, K+ 4.3, Phos 4.3, Mg 2.6, Alk Phos 83, ALT/SGPT 12, AST/SGOT 20, HbA1c --    A1C with Estimated Average Glucose Result: 7.3 % (22 @ 03:58)    Finger Sticks:  POCT Blood Glucose.: 150 mg/dL (01-10 @ 05:23)  POCT Blood Glucose.: 127 mg/dL ( @ 17:17)  POCT Blood Glucose.: 165 mg/dL ( @ 11:49)    Skin per nursing documentation: No noted pressure injuries as per documentation. R groin wound + wound VAC   Edema: none noted per flow sheets     Estimated Energy Needs: 2310 - 2695kcal (27-32kcal/kg)  Estimated Protein Needs: 100 - 115g protein (1.3-1.5g/kg)  Defer fluids to team.   Adjusted based on IBW + 10% 77kg     Previous Nutrition Diagnosis:  Severe acute malnutrition & Increased nutrient needs  Nutrition Diagnosis is: [x] ongoing  [] resolved [] not applicable     New Nutrition Diagnosis: [x] Not applicable    Nutrition Care Plan:  [x] In Progress  [] Achieved  [] Not applicable    Nutrition Interventions:     Education Provided:       [] Yes:  [x] No: not warranted at this time    Recommendations:      -Speech Pathologist consult pending. Should tube feed continue, consider changing EN regimen to better meet needs. Consider Glucerna 1.5 60ml/hr x 24 hours to provide a total volume of 1440ml, 2160kcal (28kcal/kg), 118g protein (1.5g/kg) and 1092ml free water. Defer free water flush to team.   -If diet advanced, consider consistent carbohydrate diet. If PO intake adequate, would add renal restriction as well should HD continue.  -Consider changing Multivitamin to nephrovite. Consider addition of vitamin C to aid in wound healing. Also, a daily dose of >/= 500mg vitamin C has been shown to lower elevated blood glucose levels in people with diabetes. Can continue thiamin per team.       Monitoring and Evaluation:   Continue to monitor nutritional intake, tolerance to diet prescription, weights, labs, skin integrity    RD remains available upon request and will follow up per protocol  Teri Abdi MS, RD, CDN #[phone] Nutrition Follow Up Note  Patient seen for: CTU follow up     Chart reviewed, events noted. Pt is a 61 y/o male with PMH of CABG, DM, HTN, HLD presenting as transfer from [place] with STEMI. Cardiogenic shock s/p VA ECMO decannulated . Mitral regurgitation s/p Mitral clip x1 22. Acute respiratory distress/failure s/p Tracheostomy 22. IABP placed (IABP dc'ed ).  s/p trach decannulation 23    Source: [x] Patient       [x] EMR        [] RN        [x] Family at bedside - daughter      [] Other:    -If unable to interview patient: [] Trach/Vent/BiPAP  [] Disoriented/confused/inappropriate to interview    Diet Order:   Diet, NPO with Tube Feed:   Tube Feeding Modality: Nasogastric  Glucerna 1.2 Johnathon (GLUCERNARTH)  Total Volume for 24 Hours (mL): 1560  Continuous  Starting Tube Feed Rate {mL per Hour}: 65  Increase Tube Feed Rate by (mL): 10     Every hour  Until Goal Tube Feed Rate (mL per Hour): 65  Tube Feed Duration (in Hours): 24  Tube Feed Start Time: 14:25  No Carb Prosource (1pkg = 15gms Protein)     Qty per Day:  2 (22)    EN Order with prosource provides: 1912kcal (25kcal/kg IBW + 10% - 77kg), 104g protein (1.35g/kg IBW + 10% - 77kg) and 1255ml free water  Current Pump Rate: 65ml  EN provision: 95% EN volume goal provided in past 5 days per RN documentation     Is current diet order appropriate/adequate? see below for recommendations  Pt wishing to eat by mouth. deferred advancement of diet to SLP, pt understands. reports no GI symptoms on current tube feed regimen.     Nutrition-related concerns:  -remains on insulin regimen for glycemic control   -BUN/Cr elevated. Nephrology following: "Pt with non oliguric ERIC/ ATN in the setting of STEMI, cardiac arrest & cardiogenic shock." Pt initiated on CRRT/CVVHDF 22. CRRT overnight on 22 for volume overload, stopped again . Getting daily HD/UF.  -Mariana ordered     GI:  Last BM 1/10 per flow sheets.  Bowel Regimen? [x] Yes senna and miralax  [] No    Weights:   Daily Weight in k.3 (), Weight in k.3 (), Weight in k.8 (), Weight in k.8 (), Weight in k.7 (), Weight in k.7 (), Weight in k.4 (),  Weight in k.6 ()  Weight fluctuations noted in hospital. likely due to fluid shifts. RD will continue to monitor trends.     MEDICATIONS  (STANDING):  aMIOdarone    Tablet  atorvastatin  caspofungin IVPB  caspofungin IVPB  insulin lispro (ADMELOG) corrective regimen sliding scale  insulin NPH human recombinant  metoprolol tartrate  midodrine  multivitamin/minerals/iron Oral Solution (CENTRUM)  pantoprazole  Injectable  polyethylene glycol 3350  senna  thiamine    Pertinent Labs: 01-10 @ 00:36: Na 136, BUN 38<H>, Cr 2.81<H>, <H>, K+ 4.3, Phos 4.3, Mg 2.6, Alk Phos 83, ALT/SGPT 12, AST/SGOT 20, HbA1c --    A1C with Estimated Average Glucose Result: 7.3 % (22 @ 03:58)    Finger Sticks:  POCT Blood Glucose.: 150 mg/dL (01-10 @ 05:23)  POCT Blood Glucose.: 127 mg/dL ( @ 17:17)  POCT Blood Glucose.: 165 mg/dL ( @ 11:49)    Skin per nursing documentation: No noted pressure injuries as per documentation. R groin wound + wound VAC   Edema: none noted per flow sheets     Estimated Energy Needs: 2310 - 2695kcal (27-32kcal/kg)  Estimated Protein Needs: 100 - 115g protein (1.3-1.5g/kg)  Defer fluids to team.   Adjusted based on IBW + 10% 77kg     Previous Nutrition Diagnosis:  Severe acute malnutrition & Increased nutrient needs  Nutrition Diagnosis is: [x] ongoing  [] resolved [] not applicable     New Nutrition Diagnosis: [x] Not applicable    Nutrition Care Plan:  [x] In Progress  [] Achieved  [] Not applicable    Nutrition Interventions:     Education Provided:       [] Yes:  [x] No: not warranted at this time    Recommendations:      -Speech Pathologist consult pending. Should tube feed continue, consider changing EN regimen to better meet needs. Consider Glucerna 1.5 60ml/hr x 24 hours to provide a total volume of 1440ml, 2160kcal (28kcal/kg), 118g protein (1.5g/kg) and 1092ml free water. Defer free water flush to team.   -If diet advanced, consider consistent carbohydrate diet. If PO intake adequate, would add renal restriction as well should HD continue.  -Consider changing Multivitamin to nephrovite. Consider addition of vitamin C to aid in wound healing. Also, a daily dose of >/= 500mg vitamin C has been shown to lower elevated blood glucose levels in people with diabetes. Can continue thiamin per team.       Monitoring and Evaluation:   Continue to monitor nutritional intake, tolerance to diet prescription, weights, labs, skin integrity    RD remains available upon request and will follow up per protocol  Teri Abdi MS, RD, CDN #[phone]

## 2023-01-10 NOTE — SWALLOW FEES ASSESSMENT ADULT - NS SWALLOW FEES REC ASPIR MON
Monitor for s/s aspiration/laryngeal penetration. If noted:  D/C p.o. intake, provide non-oral nutrition/hydration/meds, and contact this service @ [phone]/change of breathing pattern/cough/gurgly voice/fever/pneumonia/throat clearing/upper respiratory infection Monitor for s/s aspiration/laryngeal penetration. If noted:  D/C p.o. intake, provide non-oral nutrition/hydration/meds, and contact this service @ [phone]/change of breathing pattern/cough/gurgly voice/fever/pneumonia/throat clearing/upper respiratory infection Monitor for s/s aspiration/laryngeal penetration. If noted:  D/C p.o. intake, provide non-oral nutrition/hydration/meds, and contact this service @ [phone]/change of breathing pattern/cough/gurgly voice/fever/pneumonia/throat clearing/upper respiratory infection

## 2023-01-10 NOTE — PROGRESS NOTE ADULT - PROBLEM SELECTOR PLAN 1
Pt with non oliguric ERIC/ ATN in the setting of STEMI, cardiac arrest & cardiogenic shock  SCr on arrival was 2.15; trended down to hector 1.6 on 11/25; slowly trended up & increased to 3.95 11/28. Pt initiated on CRRT/CVVHDF to optimize volume and electrolytes on 12/5/22. Pt likely with ATN. Pt underwent decannulation of ECMO on 12/8/22 and was taken off CRRT.     Pt reinitiated on CRRT overnight on 12/9/22 for volume overload. s/p trach on 12/16. CRRT stopped again 12/19 & initiated on iHD.     Getting daily HD/UF.  Decannulated on 1/8, now on RA. Bp stable, remains off pressors. Last HD session 1/9 with 2L UF. CXR with improving b/l pulm vasc congestion on 1/10. Net even. Last Lasix 100 mg IVP on 1/8. Voided 125cc in 24 hrs. Bladder scan with 57 cc. Non tunneled cath removed overnight. Will hold off on HD today. Would continue to use diuretics as needed. Continue to monitor for renal recovery. Monitor labs and urine output. Avoid any potential nephrotoxins. Dose medications as per HD. Continue sevelamer. Phos stable. Candidemia, Unclear source  Repeat with no growth to date. TTE-ve for valvular vegetations. On caspofungin per ID Pt with non oliguric ERCI/ ATN in the setting of STEMI, cardiac arrest & cardiogenic shock  SCr on arrival was 2.15; trended down to hcetor 1.6 on 11/25; slowly trended up & increased to 3.95 11/28. Pt initiated on CRRT/CVVHDF to optimize volume and electrolytes on 12/5/22. Pt likely with ATN. Pt underwent decannulation of ECMO on 12/8/22 and was taken off CRRT.     Pt reinitiated on CRRT overnight on 12/9/22 for volume overload. s/p trach on 12/16. CRRT stopped again 12/19 & initiated on iHD.     Getting daily HD/UF.  Decannulated on 1/8, now on RA. Bp stable, remains off pressors. Last HD session 1/9 with 2L UF. CXR with improving b/l pulm vasc congestion on 1/10. Net even. Last Lasix 100 mg IVP on 1/8. Voided 125cc in 24 hrs. Bladder scan with 57 cc. Non tunneled cath removed overnight. Will hold off on HD today. Would continue to use diuretics as needed. Continue to monitor for renal recovery. Monitor labs and urine output. Avoid any potential nephrotoxins. Dose medications as per HD. Continue sevelamer. Phos stable. Candidemia, Unclear source  Repeat with no growth to date. TTE-ve for valvular vegetations. On caspofungin per ID

## 2023-01-10 NOTE — SWALLOW FEES ASSESSMENT ADULT - LARYNGEAL PENETRATION AFTER SWALLOW - SILENT
KFT compressing L arytenoid and increasing L pyriform sinus residue which resulted in residue navigating towards airway. ONCE KFT removed --> laryngeal penetration eliminated given minimal pyriform sinus residue.

## 2023-01-10 NOTE — SWALLOW FEES ASSESSMENT ADULT - RESIDUE IN VALLECULAE
Trace Mild + multiple reflexive swallows reduces material to mild/Mild/Moderate + multiple reflexive swallows reduces material to trace/Mild

## 2023-01-10 NOTE — CONSULT NOTE ADULT - SUBJECTIVE AND OBJECTIVE BOX
Interventional Radiology    Evaluate for Procedure: Tunneled HD catheter Placement    HPI: 62 year old male with a history of CAD s/p 4v CABG ~2019 in [facility], DM2 (A1c 7.3%), and HTN who initially presented to OSH with abdominal pain and n/v and found to have pancreatitis with lipase > 3000 though also with elevated troponins. CT abdomen revealed acute pancreatitis and his initial LVEF was 40-45%. He developed MSOF with hypoxic respiratory failure requiring intubation and ERIC and went into hypoxic PEA arrest requiring ACLS and due to persistent hypoxia and hypotension on pressors after ROSC was cannulated to VA ECMO (LFV, RFA, no anterograde perfusion catheter) on 11/25. Notably CTH that day revealed small subdural hemorrhage.     Patient post arrest TTE on 11/26 showed a significantly worse LVEF of 5-10% with an AV that was only minimally opening. Since then he has had improvement in his LV function to ~35-40% and improved pulsatility while tolerating progressive slow ECMO weans. ECMO turndown (note in chart 11/30) was reassuring for ability to decannulate to IABP/inotropes. LHC 12/06 showed closure of his 3 vein grafts with graft to LAD patent though with distal native disease. No coronary intervention was done. IABP placed, ECMO successfully decannulated 12/08 (transfused 2u pRBCs during).     Patient ARDS has improved and he's now s/p trach (decannulated 1/8). Patient is now off IABP, removed 12/17, and inotropes, however has previously been unable to tolerate GDMT due to soft BPs and renal failure requiring dialysis. 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 Patient is afebrile with improving leukocytosis. Patient is tolerating intermittent HD with midodrine for BP support.     Allergies:     Medications (Abx/Cardiac/Anticoagulation/Blood Products)  aMIOdarone    Tablet: 200 milliGRAM(s) Oral (01-10 @ 05:27)  aspirin  chewable: 81 milliGRAM(s) Oral (01-10 @ 11:23)  caspofungin IVPB: 260 mL/Hr IV Intermittent (01-09 @ 17:50)  heparin   Injectable: 5000 Unit(s) SubCutaneous (01-10 @ 09:56)  metoprolol tartrate: 12.5 milliGRAM(s) Oral (01-09 @ 17:25)  midodrine: 10 milliGRAM(s) Oral (01-09 @ 21:12)  midodrine: 10 milliGRAM(s) Oral (01-09 @ 23:21)  midodrine: 20 milliGRAM(s) Oral (01-10 @ 13:57)    Data: T(C): 36.2  HR: 104  BP: --  RR: 18  SpO2: 97%    -WBC 11.68 / HgB 9.0 / Hct 28.7 / Plt 134  -Na 136 / Cl 97 / BUN 38 / Glucose 145  -K 4.3 / CO2 26 / Cr 2.81  -ALT 12 / Alk Phos 83 / T.Bili 0.8  -INR 1.06 / PTT 31.2        Assessment/Plan: 62 year old male with a history of CAD s/p 4v CABG ~2019 in [facility], DM2 (A1c 7.3%), and HTN now s/p VA ECMO. IR consulted for Tunneled HD Catheter placement    - Place IR procedure order for Tunneled HD catheter  - Will plan to perform procedure tomorrow 1/11/23  - Will need chart note from ID for clearance of catheter placement prior to procedure  - Make patient NPO after midnight  - COVID swab within 5 days of procedure  - Maintain active type and screen  - Send AM labs including coags on 1/11/23     Interventional Radiology    Evaluate for Procedure: Tunneled HD catheter Placement    HPI: 62 year old male with a history of CAD s/p 4v CABG ~2019 in [facility], DM2 (A1c 7.3%), and HTN who initially presented to OSH with abdominal pain and n/v and found to have pancreatitis with lipase > 3000 though also with elevated troponins. CT abdomen revealed acute pancreatitis and his initial LVEF was 40-45%. He developed MSOF with hypoxic respiratory failure requiring intubation and ERIC and went into hypoxic PEA arrest requiring ACLS and due to persistent hypoxia and hypotension on pressors after ROSC was cannulated to VA ECMO (LFV, RFA, no anterograde perfusion catheter) on 11/25. Notably CTH that day revealed small subdural hemorrhage.     Patient post arrest TTE on 11/26 showed a significantly worse LVEF of 5-10% with an AV that was only minimally opening. Since then he has had improvement in his LV function to ~35-40% and improved pulsatility while tolerating progressive slow ECMO weans. ECMO turndown (note in chart 11/30) was reassuring for ability to decannulate to IABP/inotropes. LHC 12/06 showed closure of his 3 vein grafts with graft to LAD patent though with distal native disease. No coronary intervention was done. IABP placed, ECMO successfully decannulated 12/08 (transfused 2u pRBCs during).     Patient ARDS has improved and he's now s/p trach (decannulated 1/8). Patient is now off IABP, removed 12/17, and inotropes, however has previously been unable to tolerate GDMT due to soft BPs and renal failure requiring dialysis. 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 Patient is afebrile with improving leukocytosis. Patient is tolerating intermittent HD with midodrine for BP support.     Allergies:     Medications (Abx/Cardiac/Anticoagulation/Blood Products)  aMIOdarone    Tablet: 200 milliGRAM(s) Oral (01-10 @ 05:27)  aspirin  chewable: 81 milliGRAM(s) Oral (01-10 @ 11:23)  caspofungin IVPB: 260 mL/Hr IV Intermittent (01-09 @ 17:50)  heparin   Injectable: 5000 Unit(s) SubCutaneous (01-10 @ 09:56)  metoprolol tartrate: 12.5 milliGRAM(s) Oral (01-09 @ 17:25)  midodrine: 10 milliGRAM(s) Oral (01-09 @ 21:12)  midodrine: 10 milliGRAM(s) Oral (01-09 @ 23:21)  midodrine: 20 milliGRAM(s) Oral (01-10 @ 13:57)    Data: T(C): 36.2  HR: 104  BP: --  RR: 18  SpO2: 97%    -WBC 11.68 / HgB 9.0 / Hct 28.7 / Plt 134  -Na 136 / Cl 97 / BUN 38 / Glucose 145  -K 4.3 / CO2 26 / Cr 2.81  -ALT 12 / Alk Phos 83 / T.Bili 0.8  -INR 1.06 / PTT 31.2        Assessment/Plan: 62 year old male with a history of CAD s/p 4v CABG ~2019 in [facility], DM2 (A1c 7.3%), and HTN now s/p VA ECMO. IR consulted for Tunneled HD Catheter placement    - Place IR procedure order for Tunneled HD catheter  - Will plan to perform procedure tomorrow 1/11/23  - Will need chart note from ID for clearance of catheter placement prior to procedure  - Make patient NPO after midnight  - COVID swab within 5 days of procedure  - Maintain active type and screen  - Send AM labs including coags on 1/11/23     Interventional Radiology    Evaluate for Procedure: Tunneled HD catheter Placement    HPI: 62 year old male with a history of CAD s/p 4v CABG ~2019 in [facility], DM2 (A1c 7.3%), and HTN who initially presented to OSH with abdominal pain and n/v and found to have pancreatitis with lipase > 3000 though also with elevated troponins. CT abdomen revealed acute pancreatitis and his initial LVEF was 40-45%. He developed MSOF with hypoxic respiratory failure requiring intubation and ERIC and went into hypoxic PEA arrest requiring ACLS and due to persistent hypoxia and hypotension on pressors after ROSC was cannulated to VA ECMO (LFV, RFA, no anterograde perfusion catheter) on 11/25. Notably CTH that day revealed small subdural hemorrhage.     Patient post arrest TTE on 11/26 showed a significantly worse LVEF of 5-10% with an AV that was only minimally opening. Since then he has had improvement in his LV function to ~35-40% and improved pulsatility while tolerating progressive slow ECMO weans. ECMO turndown (note in chart 11/30) was reassuring for ability to decannulate to IABP/inotropes. LHC 12/06 showed closure of his 3 vein grafts with graft to LAD patent though with distal native disease. No coronary intervention was done. IABP placed, ECMO successfully decannulated 12/08 (transfused 2u pRBCs during).     Patient ARDS has improved and he's now s/p trach (decannulated 1/8). Patient is now off IABP, removed 12/17, and inotropes, however has previously been unable to tolerate GDMT due to soft BPs and renal failure requiring dialysis. 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 Patient is afebrile with improving leukocytosis. Patient is tolerating intermittent HD with midodrine for BP support.     Allergies:     Medications (Abx/Cardiac/Anticoagulation/Blood Products)  aMIOdarone    Tablet: 200 milliGRAM(s) Oral (01-10 @ 05:27)  aspirin  chewable: 81 milliGRAM(s) Oral (01-10 @ 11:23)  caspofungin IVPB: 260 mL/Hr IV Intermittent (01-09 @ 17:50)  heparin   Injectable: 5000 Unit(s) SubCutaneous (01-10 @ 09:56)  metoprolol tartrate: 12.5 milliGRAM(s) Oral (01-09 @ 17:25)  midodrine: 10 milliGRAM(s) Oral (01-09 @ 21:12)  midodrine: 10 milliGRAM(s) Oral (01-09 @ 23:21)  midodrine: 20 milliGRAM(s) Oral (01-10 @ 13:57)    Data: T(C): 36.2  HR: 104  BP: --  RR: 18  SpO2: 97%    -WBC 11.68 / HgB 9.0 / Hct 28.7 / Plt 134  -Na 136 / Cl 97 / BUN 38 / Glucose 145  -K 4.3 / CO2 26 / Cr 2.81  -ALT 12 / Alk Phos 83 / T.Bili 0.8  -INR 1.06 / PTT 31.2        Assessment/Plan: 62 year old male with a history of CAD s/p 4v CABG ~2019 in [facility], DM2 (A1c 7.3%), and HTN now s/p VA ECMO. IR consulted for Tunneled HD Catheter placement    - Place IR procedure order for Tunneled HD catheter  - Will plan to perform procedure tomorrow 1/11/23  - Will need chart note from ID for clearance of catheter placement prior to procedure  - Make patient NPO after midnight  - COVID swab within 5 days of procedure  - Maintain active type and screen  - Send AM labs including coags on 1/11/23

## 2023-01-10 NOTE — SWALLOW FEES ASSESSMENT ADULT - SLP PERTINENT HISTORY OF CURRENT PROBLEM
61 y/o M with PMH of CABG, DM, HTN, and HLD presenting 11/24/22 as transfer from [place] for c/f STEMI. Patient had increasing troponins throughout the day and worsening respiratory status with rising lactate. Patient went into cardiac arrest 11/24 and ACLS was started. CICU was consulted due to frothy oral secretions during the arrest. Intubated, placed on ECMO 11/25. Nsx consulted. CTH 4mm tentorial SDH. Intubated, perrl, FC x 4 w/ sedation held. No nsgy intervention. GI consulted due to concern for gallstone pancreatitis. Unable to undergo MRCP while on ECMO. Palliative Care following. Nephrology following for ERIC. s/p ECMO removal 12/8. ENT consulted 12/8 for soft palate laceration. Neuro consulted 12/12. Impression: Acute change in mentation and functional status with R gaze preference, generalized shivering, not following commands, concerning for possible acute ischemic event vs encephalopathy vs seizure in the setting of critically ill patient with multiple comorbidities. 61 y/o M with PMH of CABG, DM, HTN, and HLD presenting 11/24/22 as transfer from [place] for c/f STEMI. Patient had increasing troponins throughout the day and worsening respiratory status with rising lactate. Patient went into cardiac arrest 11/24 and ACLS was started. CICU was consulted due to frothy oral secretions during the arrest. Intubated, placed on ECMO 11/25. Nsx consulted. CTH 4mm tentorial SDH. Intubated, perrl, FC x 4 w/ sedation held. No nsgy intervention. GI consulted due to concern for gallstone pancreatitis. Unable to undergo MRCP while on ECMO. Palliative Care following. Nephrology following for ERIC. s/p ECMO removal 12/8. ENT consulted 12/8 for soft palate laceration. Neuro consulted 12/12. Impression: Acute change in mentation and functional status with R gaze preference, generalized shivering, not following commands, concerning for possible acute ischemic event vs encephalopathy vs seizure in the setting of critically ill patient with multiple comorbidities. 61 y/o M with PMH of CABG, DM, HTN, and HLD presenting 11/24/22 as transfer from [place] for c/f STEMI. Patient had increasing troponins throughout the day and worsening respiratory status with rising lactate. Patient went into cardiac arrest 11/24 and ACLS was started. CICU was consulted due to frothy oral secretions during the arrest. Intubated, placed on ECMO 11/25. Nsx consulted. CTH 4mm tentorial SDH. Intubated, perrl, FC x 4 w/ sedation held. No nsgy intervention. GI consulted due to concern for gallstone pancreatitis. Unable to undergo MRCP while on ECMO. Palliative Care following. Nephrology following for ERIC. s/p ECMO removal 12/8. ENT consulted 12/8 for soft palate laceration. Neuro consulted 12/12. Impression: Acute change in mentation and functional status with R gaze preference, generalized shivering, not following commands, concerning for possible acute ischemic event vs encephalopathy vs seizure in the setting of critically ill patient with multiple comorbidities.

## 2023-01-10 NOTE — PROGRESS NOTE ADULT - NS ATTEND AMEND GEN_ALL_CORE FT
Patient was seen and examined at bedside with the advanced care provider.    Continues to do well.  Appears euvolemic on exam. Now on iHD. BP appears to be starting to come up, please use midodrine only preHD and limit its use as this will allow us to attempt adding GDMT in the future. PT/OT

## 2023-01-10 NOTE — PROGRESS NOTE ADULT - SUBJECTIVE AND OBJECTIVE BOX
ADVANCED HEART FAILURE & TRANSPLANT  - PROGRESS NOTE  *To reach the NS1 Team from 8am to 5pm, please call [phone].   _______________________________________________________________________________________________________    Subjective:  - NAEO  - OOB to chair with no complaints    Medications:  acetaminophen    Suspension .. 650 milliGRAM(s) Oral every 6 hours PRN  aMIOdarone    Tablet 200 milliGRAM(s) Oral daily  aspirin  chewable 81 milliGRAM(s) Oral daily  atorvastatin 80 milliGRAM(s) Oral at bedtime  caspofungin IVPB 50 milliGRAM(s) IV Intermittent every 24 hours  caspofungin IVPB      chlorhexidine 2% Cloths 1 Application(s) Topical <User Schedule>  epoetin teena-epbx (RETACRIT) Injectable 5000 Unit(s) IV Push <User Schedule>  heparin   Injectable 5000 Unit(s) SubCutaneous every 8 hours  insulin lispro (ADMELOG) corrective regimen sliding scale   SubCutaneous every 6 hours  insulin NPH human recombinant 7 Unit(s) SubCutaneous every 6 hours  metoprolol tartrate 12.5 milliGRAM(s) Oral every 12 hours  midodrine 20 milliGRAM(s) Oral every 8 hours  multivitamin/minerals/iron Oral Solution (CENTRUM) 15 milliLiter(s) Oral daily  pantoprazole  Injectable 40 milliGRAM(s) IV Push daily  polyethylene glycol 3350 17 Gram(s) Oral daily  senna 2 Tablet(s) Oral at bedtime  sevelamer carbonate 1600 milliGRAM(s) Oral every 8 hours  simethicone 80 milliGRAM(s) Chew daily PRN  sodium chloride 0.9% lock flush 10 milliLiter(s) IV Push every 1 hour PRN  thiamine 100 milliGRAM(s) Enteral Tube daily      ICU Vital Signs Last 24 Hrs  T(C): 36.2, Max: 36.2 (01-10 @ 12:00)  HR: 97 (88 - 103)  BP: --  BP(mean): --  ABP: 118/45 (79/36 - 154/60)  ABP(mean): 67 (49 - 89)  RR: 12 (10 - 30)  SpO2: 98% (93% - 100%)    Weight in k.3 (23)  Weight in k.3 (23)      I&O's Summary Last 24 Hrs    IN: 2934.2 mL / OUT: 3025 mL / NET: -90.8 mL      Tele: SR    Physical Exam:    General: No distress. Comfortable.  HEENT: EOM intact.  Neck: JVP not elevated.  Respiratory: Clear to auscultation bilaterally  CV: RRR. Normal S1 and S2. No murmurs, rub, or gallops. Radial pulses normal.  Abdomen: Soft, non-distended, non-tender  Skin: No skin lesions  Extremities: Warm, no edema  Neurology: Non-focal, alert and oriented times three.   Psych: Affect normal    Labs:    ( 01-10-23 @ 00:36 )               9.0    11.68 )--------( 134                  28.7     ( 01-10-23 @ 00:36 )     136  |  97  |  38  ---------------------<  145  4.3  |  26  |  2.81    Ca 9.1  Phos 4.3  Mg 2.6    ( 01-10-23 @ 00:36 )  TPro  7.6  /  Alb  3.6  /  TBili  0.8  /  DBili  x   /  AST  20  /  ALT  12  /  AlkPhos  83  ( 23 @ 00:35 )  TPro  7.2  /  Alb  3.3  /  TBili  0.9  /  DBili  x   /  AST  25  /  ALT  16  /  AlkPhos  78    ( 22 @ 10:55 )  TropHS 1085  / CK 27    / CKMB x        ABG - ( 01-10-23 @ 00:05 )  pH: 7.48  / pCO2: 39    / pO2: 102   / HCO3: 29    / Base Excess: 5.1   / SaO2: 98.2    ADVANCED HEART FAILURE & TRANSPLANT  - PROGRESS NOTE  *To reach the NS1 Team from 8am to 5pm, please call [phone].   _______________________________________________________________________________________________________    Subjective:  - NAEO  - OOB to chair with no complaints    Medications:  acetaminophen    Suspension .. 650 milliGRAM(s) Oral every 6 hours PRN  aMIOdarone    Tablet 200 milliGRAM(s) Oral daily  aspirin  chewable 81 milliGRAM(s) Oral daily  atorvastatin 80 milliGRAM(s) Oral at bedtime  caspofungin IVPB 50 milliGRAM(s) IV Intermittent every 24 hours  caspofungin IVPB      chlorhexidine 2% Cloths 1 Application(s) Topical <User Schedule>  epoetin teena-epbx (RETACRIT) Injectable 5000 Unit(s) IV Push <User Schedule>  heparin   Injectable 5000 Unit(s) SubCutaneous every 8 hours  insulin lispro (ADMELOG) corrective regimen sliding scale   SubCutaneous every 6 hours  insulin NPH human recombinant 7 Unit(s) SubCutaneous every 6 hours  metoprolol tartrate 12.5 milliGRAM(s) Oral every 12 hours  midodrine 20 milliGRAM(s) Oral every 8 hours  multivitamin/minerals/iron Oral Solution (CENTRUM) 15 milliLiter(s) Oral daily  pantoprazole  Injectable 40 milliGRAM(s) IV Push daily  polyethylene glycol 3350 17 Gram(s) Oral daily  senna 2 Tablet(s) Oral at bedtime  sevelamer carbonate 1600 milliGRAM(s) Oral every 8 hours  simethicone 80 milliGRAM(s) Chew daily PRN  sodium chloride 0.9% lock flush 10 milliLiter(s) IV Push every 1 hour PRN  thiamine 100 milliGRAM(s) Enteral Tube daily      ICU Vital Signs Last 24 Hrs  T(C): 36.2, Max: 36.2 (01-10 @ 12:00)  HR: 97 (88 - 103)  BP: --  BP(mean): --  ABP: 118/45 (79/36 - 154/60)  ABP(mean): 67 (49 - 89)  RR: 12 (10 - 30)  SpO2: 98% (93% - 100%)    Weight in k.3 (23)  Weight in k.3 (23)      I&O's Summary Last 24 Hrs    IN: 2934.2 mL / OUT: 3025 mL / NET: -90.8 mL      Tele: SR    Physical Exam:    General: No distress. Comfortable.  HEENT: EOM intact.  Neck: JVP not elevated.  Respiratory: Clear to auscultation bilaterally  CV: RRR. Normal S1 and S2. No murmurs, rub, or gallops. Radial pulses normal.  Abdomen: Soft, non-distended, non-tender  Skin: No skin lesions  Extremities: Warm, no edema  Neurology: Non-focal, alert and oriented times three.   Psych: Affect normal    Labs:    ( 01-10-23 @ 00:36 )               9.0    11.68 )--------( 134                  28.7     ( 01-10-23 @ 00:36 )     136  |  97  |  38  ---------------------<  145  4.3  |  26  |  2.81    Ca 9.1  Phos 4.3  Mg 2.6    ( 01-10-23 @ 00:36 )  TPro  7.6  /  Alb  3.6  /  TBili  0.8  /  DBili  x   /  AST  20  /  ALT  12  /  AlkPhos  83  ( 23 @ 00:35 )  TPro  7.2  /  Alb  3.3  /  TBili  0.9  /  DBili  x   /  AST  25  /  ALT  16  /  AlkPhos  78    ( 22 @ 10:55 )  TropHS 1085  / CK 27    / CKMB x        ABG - ( 01-10-23 @ 00:05 )  pH: 7.48  / pCO2: 39    / pO2: 102   / HCO3: 29    / Base Excess: 5.1   / SaO2: 98.2    ADVANCED HEART FAILURE & TRANSPLANT  - PROGRESS NOTE  *To reach the NS1 Team from 8am to 5pm, please call [phone].   _______________________________________________________________________________________________________    Subjective:  - NAEO  - OOB to chair with no complaints    Medications:  acetaminophen    Suspension .. 650 milliGRAM(s) Oral every 6 hours PRN  aMIOdarone    Tablet 200 milliGRAM(s) Oral daily  aspirin  chewable 81 milliGRAM(s) Oral daily  atorvastatin 80 milliGRAM(s) Oral at bedtime  caspofungin IVPB 50 milliGRAM(s) IV Intermittent every 24 hours  caspofungin IVPB      chlorhexidine 2% Cloths 1 Application(s) Topical <User Schedule>  epoetin teena-epbx (RETACRIT) Injectable 5000 Unit(s) IV Push <User Schedule>  heparin   Injectable 5000 Unit(s) SubCutaneous every 8 hours  insulin lispro (ADMELOG) corrective regimen sliding scale   SubCutaneous every 6 hours  insulin NPH human recombinant 7 Unit(s) SubCutaneous every 6 hours  metoprolol tartrate 12.5 milliGRAM(s) Oral every 12 hours  midodrine 20 milliGRAM(s) Oral every 8 hours  multivitamin/minerals/iron Oral Solution (CENTRUM) 15 milliLiter(s) Oral daily  pantoprazole  Injectable 40 milliGRAM(s) IV Push daily  polyethylene glycol 3350 17 Gram(s) Oral daily  senna 2 Tablet(s) Oral at bedtime  sevelamer carbonate 1600 milliGRAM(s) Oral every 8 hours  simethicone 80 milliGRAM(s) Chew daily PRN  sodium chloride 0.9% lock flush 10 milliLiter(s) IV Push every 1 hour PRN  thiamine 100 milliGRAM(s) Enteral Tube daily      ICU Vital Signs Last 24 Hrs  T(C): 36.2, Max: 36.2 (01-10 @ 12:00)  HR: 97 (88 - 103)  BP: --  BP(mean): --  ABP: 118/45 (79/36 - 154/60)  ABP(mean): 67 (49 - 89)  RR: 12 (10 - 30)  SpO2: 98% (93% - 100%)    Weight in k.3 (23)  Weight in k.3 (23)      I&O's Summary Last 24 Hrs    IN: 2934.2 mL / OUT: 3025 mL / NET: -90.8 mL      Tele: SR    Physical Exam:    General: No distress. Comfortable.  HEENT: EOM intact.  Neck: JVP not elevated.  Respiratory: Clear to auscultation bilaterally  CV: RRR. Normal S1 and S2. No murmurs, rub, or gallops. Radial pulses normal.  Abdomen: Soft, non-distended, non-tender  Skin: No skin lesions  Extremities: Warm, no edema  Neurology: Non-focal, alert and oriented times three.   Psych: Affect normal    Labs:    ( 01-10-23 @ 00:36 )               9.0    11.68 )--------( 134                  28.7     ( 01-10-23 @ 00:36 )     136  |  97  |  38  ---------------------<  145  4.3  |  26  |  2.81    Ca 9.1  Phos 4.3  Mg 2.6    ( 01-10-23 @ 00:36 )  TPro  7.6  /  Alb  3.6  /  TBili  0.8  /  DBili  x   /  AST  20  /  ALT  12  /  AlkPhos  83  ( 23 @ 00:35 )  TPro  7.2  /  Alb  3.3  /  TBili  0.9  /  DBili  x   /  AST  25  /  ALT  16  /  AlkPhos  78    ( 22 @ 10:55 )  TropHS 1085  / CK 27    / CKMB x        ABG - ( 01-10-23 @ 00:05 )  pH: 7.48  / pCO2: 39    / pO2: 102   / HCO3: 29    / Base Excess: 5.1   / SaO2: 98.2

## 2023-01-10 NOTE — SWALLOW BEDSIDE ASSESSMENT ADULT - COMMENTS
63 y/o M with PMH of CABG, DM, HTN, and HLD presenting as transfer from [place] for c/f STEMI. PTA arrival received 325 aspirin, 600 plavix, and no heparin drip started. Around 1:30 am patient had multiple episodes of non-bloody diarrhea and emesis with associated abdominal pain and chest pain, unable to localize, non-radiating, with associated SOB and no associated palpitations or diaphoresis. No recent fevers/chills, cough, rashes, or changes in urination. Does report mild diffuse back pain; started 5 days ago in setting on injury while walking and lifting objects. Denies focal weakness, numbness/tingling, or LOC due does report mild dizziness.    Patient seen and examined in ED. Hemodynamically stable, alert and awake, A&Ox3. Daughter at bedside. Reports abdominal pain, and nausea. Abdomen is soft, tender in epigastric area and RUQ. Denies chest pain, SOB. WBC 20, T.bili 3.4, Lipase 300. RUQ US demonstrated gallbladder stones, pericholecystic fluid. 63 y/o M with PMH of CABG, DM, HTN, and HLD presenting as transfer from [place] for c/f STEMI. PTA arrival received 325 aspirin, 600 plavix, and no heparin drip started. Around 1:30 am patient had multiple episodes of non-bloody diarrhea and emesis with associated abdominal pain and chest pain, unable to localize, non-radiating, with associated SOB and no associated palpitations or diaphoresis. No recent fevers/chills, cough, rashes, or changes in urination. Does report mild diffuse back pain; started 5 days ago in setting on injury while walking and lifting objects. Denies focal weakness, numbness/tingling, or LOC due does report mild dizziness.    Patient seen and examined in ED. Hemodynamically stable, alert and awake, A&Ox3. Daughter at bedside. Reports abdominal pain, and nausea. Abdomen is soft, tender in epigastric area and RUQ. Denies chest pain, SOB. WBC 20, T.bili 3.4, Lipase 300. RUQ US demonstrated gallbladder stones, pericholecystic fluid. 63 y/o M with PMH of CABG, DM, HTN, and HLD presenting as transfer from [place] for c/f STEMI. PTA arrival received 325 aspirin, 600 plavix, and no heparin drip started. Around 1:30 am patient had multiple episodes of non-bloody diarrhea and emesis with associated abdominal pain and chest pain, unable to localize, non-radiating, with associated SOB and no associated palpitations or diaphoresis. No recent fevers/chills, cough, rashes, or changes in urination. Does report mild diffuse back pain; started 5 days ago in setting on injury while walking and lifting objects. Denies focal weakness, numbness/tingling, or LOC due does report mild dizziness.    Patient seen and examined in ED. Hemodynamically stable, alert and awake, A&Ox3. Daughter at bedside. Reports abdominal pain, and nausea. Abdomen is soft, tender in epigastric area and RUQ. Denies chest pain, SOB. WBC 20, T.bili 3.4, Lipase 300. RUQ US demonstrated gallbladder stones, pericholecystic fluid. Hx cont: 12/13: IABP in place. On CVVHD. Mental status is improving. s/p trach and Mitral clip inserted in OR 12/16. S/P #7 Proximal XLT Tracheostomy to vent. s/p IABP removal 12/17. Intermittently requiring HD. Vascular surgery consulted for AVF planning 12/21. Tolerated trach collar all day 12/25.  Ophthalmology consulted in the setting of candidemia on 12/30. F/u as OP. Wound care consulted 1/5/23 for sacral/bilateral buttocks skin damage. s/p trach decannulation 1/8/23.     IMAGING:  CXR: 1/9/23  IMPRESSION: Pulmonary congestion.    CT HEAD: 12/13/22  IMPRESSION: Similar minimal increased density along the right aspect of the posterior falx and right tentorial leaf, consistent with minimal residual subdural hemorrhage. No parenchymal hemorrhage or brain edema.    Pt is unknown to this service.

## 2023-01-10 NOTE — PROGRESS NOTE ADULT - SUBJECTIVE AND OBJECTIVE BOX
[facility] Division of Kidney Diseases & Hypertension  FOLLOW UP NOTE  [phone]--------------------------------------------------------------------------------  Chief Complaint:Acute pancreatitis without infection or necrosis        HPI: 63 y/o male with PMH of CABG, DM, HTN, HLD presenting as transfer from [place] for c/f STEMI. Course c/b gallstone pancreatitis leading to ARDS and shock & cardiac arrest now on [facility] ECMO. Had significant troponin elevation and his LVEF down to 8%. Pt was deemed to be too unstable to have an angiogram and potential PCI due to his co-morbidities & a new subdural bleed. Pt being seen for ERIC. SCr on arrival was 2.15; trended down to hector 1.6 on 11/25 and eventually increased to 3.95 11/28. Pt received IV diuretics as per CTU. Pt initiated on CRRT on 12/5/22 to optimize volume status and electrolytes.  Pt underwent decannulation of ECMO on 12/8/22. Pt was restarted on 12/9/22 for volume overload. Pt underwent mitral clip OR (12/16/22). s/p trach on 12/16. Decannulated on 1/8. Candidemia, on caspofungin per ID       24 hour events/subjective: Patient seen & examined. Labs & vitals reviewed.  Bp stable, remains off pressors. Last HD session 1/9 with 2L UF. CXR with improving b/l pulm vasc congestion on 1/10. Net even. Last Lasix 100 mg IVP on 1/8. Voided 125cc in 24 hrs. Bladder scan with 57 cc. Non tunneled cath removed overnight            PAST HISTORY  --------------------------------------------------------------------------------  No significant changes to PMH, PSH, FHx, SHx, unless otherwise noted    ALLERGIES & MEDICATIONS  --------------------------------------------------------------------------------  Allergies    No Known Allergies    Intolerances      Standing Inpatient Medications  aMIOdarone    Tablet 200 milliGRAM(s) Oral daily  aspirin  chewable 81 milliGRAM(s) Oral daily  atorvastatin 80 milliGRAM(s) Oral at bedtime  caspofungin IVPB 50 milliGRAM(s) IV Intermittent every 24 hours  caspofungin IVPB      chlorhexidine 2% Cloths 1 Application(s) Topical <User Schedule>  epoetin teena-epbx (RETACRIT) Injectable 5000 Unit(s) IV Push <User Schedule>  heparin   Injectable 5000 Unit(s) SubCutaneous every 8 hours  insulin lispro (ADMELOG) corrective regimen sliding scale   SubCutaneous every 6 hours  insulin NPH human recombinant 7 Unit(s) SubCutaneous every 6 hours  metoclopramide Injectable 5 milliGRAM(s) IV Push every 8 hours  metoprolol tartrate 12.5 milliGRAM(s) Oral every 12 hours  midodrine 10 milliGRAM(s) Oral every 8 hours  multivitamin/minerals/iron Oral Solution (CENTRUM) 15 milliLiter(s) Oral daily  pantoprazole  Injectable 40 milliGRAM(s) IV Push daily  polyethylene glycol 3350 17 Gram(s) Oral daily  senna 2 Tablet(s) Oral at bedtime  sevelamer carbonate Powder 800 milliGRAM(s) Oral three times a day with meals  thiamine 100 milliGRAM(s) Enteral Tube daily    PRN Inpatient Medications  acetaminophen    Suspension .. 650 milliGRAM(s) Oral every 6 hours PRN  simethicone 80 milliGRAM(s) Chew daily PRN  sodium chloride 0.9% lock flush 10 milliLiter(s) IV Push every 1 hour PRN      REVIEW OF SYSTEMS  --------------------------------------------------------------------------------  Gen: No chills  Respiratory: No dyspnea, cough  CV: No chest pain  GI: No abdominal pain, diarrhea,  nausea, vomiting  MSK:  no edema  Neuro: No dizziness/lightheadedness      All other systems were reviewed and are negative, except as noted.    VITALS/PHYSICAL EXAM  --------------------------------------------------------------------------------  T(C): 36.1 (01-08-23 @ 08:00), Max: 36.7 (01-07-23 @ 20:00)  HR: 90 (01-08-23 @ 09:00) (88 - 110)  BP: --  RR: 19 (01-08-23 @ 09:00) (3 - 37)  SpO2: 100% (01-08-23 @ 09:00) (98% - 100%)  Wt(kg): --        01-07-23 @ 07:01  -  01-08-23 @ 07:00  --------------------------------------------------------  IN: 1965 mL / OUT: 2040 mL / NET: -75 mL    01-08-23 @ 07:01  -  01-08-23 @ 10:01  --------------------------------------------------------  IN: 225 mL / OUT: 0 mL / NET: 225 mL      Physical exam:   Gen: NAD  	HEENT: Anicteric  	Pulm: CTA b/l   	CV: S1S2+  	Abd: Soft, +BS      	Ext: no LE edema B/L  	Neuro: Awake  	Skin: Warm and dry  	Vascular access:     LABS/STUDIES  --------------------------------------------------------------------------------              9.3    13.57 >-----------<  140      [01-08-23 @ 00:22]              30.0     137  |  96  |  28  ----------------------------<  109      [01-08-23 @ 00:21]  3.7   |  26  |  1.95        Ca     9.2     [01-08-23 @ 00:21]      Mg     2.4     [01-08-23 @ 00:21]      Phos  2.7     [01-08-23 @ 00:21]    TPro  8.0  /  Alb  3.9  /  TBili  1.4  /  DBili  x   /  AST  28  /  ALT  16  /  AlkPhos  86  [01-08-23 @ 00:21]          Creatinine Trend:  SCr 1.95 [01-08 @ 00:21]  SCr 2.71 [01-07 @ 00:48]  SCr 3.80 [01-06 @ 00:38]  SCr 2.77 [01-05 @ 00:11]  SCr 3.71 [01-04 @ 00:27]          HBsAg Nonreact      [01-04-23 @ 10:02]     [facility] Division of Kidney Diseases & Hypertension  FOLLOW UP NOTE  [phone]--------------------------------------------------------------------------------  Chief Complaint:Acute pancreatitis without infection or necrosis        HPI: 63 y/o male with PMH of CABG, DM, HTN, HLD presenting as transfer from [place] for c/f STEMI. Course c/b gallstone pancreatitis leading to ARDS and shock & cardiac arrest now on [facility] ECMO. Had significant troponin elevation and his LVEF down to 8%. Pt was deemed to be too unstable to have an angiogram and potential PCI due to his co-morbidities & a new subdural bleed. Pt being seen for ERIC. SCr on arrival was 2.15; trended down to hector 1.6 on 11/25 and eventually increased to 3.95 11/28. Pt received IV diuretics as per CTU. Pt initiated on CRRT on 12/5/22 to optimize volume status and electrolytes.  Pt underwent decannulation of ECMO on 12/8/22. Pt was restarted on 12/9/22 for volume overload. Pt underwent mitral clip OR (12/16/22). s/p trach on 12/16. Decannulated on 1/8. Candidemia, on caspofungin per ID       24 hour events/subjective: Patient seen & examined. Labs & vitals reviewed.  Bp stable, remains off pressors. Last HD session 1/9 with 2L UF. CXR with improving b/l pulm vasc congestion on 1/10. Net even. Last Lasix 100 mg IVP on 1/8. Voided 125cc in 24 hrs. Bladder scan with 57 cc. Non tunneled cath removed overnight            PAST HISTORY  --------------------------------------------------------------------------------  No significant changes to PMH, PSH, FHx, SHx, unless otherwise noted    ALLERGIES & MEDICATIONS  --------------------------------------------------------------------------------  Allergies    No Known Allergies    Intolerances      Standing Inpatient Medications  aMIOdarone    Tablet 200 milliGRAM(s) Oral daily  aspirin  chewable 81 milliGRAM(s) Oral daily  atorvastatin 80 milliGRAM(s) Oral at bedtime  caspofungin IVPB 50 milliGRAM(s) IV Intermittent every 24 hours  caspofungin IVPB      chlorhexidine 2% Cloths 1 Application(s) Topical <User Schedule>  epoetin teena-epbx (RETACRIT) Injectable 5000 Unit(s) IV Push <User Schedule>  heparin   Injectable 5000 Unit(s) SubCutaneous every 8 hours  insulin lispro (ADMELOG) corrective regimen sliding scale   SubCutaneous every 6 hours  insulin NPH human recombinant 7 Unit(s) SubCutaneous every 6 hours  metoclopramide Injectable 5 milliGRAM(s) IV Push every 8 hours  metoprolol tartrate 12.5 milliGRAM(s) Oral every 12 hours  midodrine 10 milliGRAM(s) Oral every 8 hours  multivitamin/minerals/iron Oral Solution (CENTRUM) 15 milliLiter(s) Oral daily  pantoprazole  Injectable 40 milliGRAM(s) IV Push daily  polyethylene glycol 3350 17 Gram(s) Oral daily  senna 2 Tablet(s) Oral at bedtime  sevelamer carbonate Powder 800 milliGRAM(s) Oral three times a day with meals  thiamine 100 milliGRAM(s) Enteral Tube daily    PRN Inpatient Medications  acetaminophen    Suspension .. 650 milliGRAM(s) Oral every 6 hours PRN  simethicone 80 milliGRAM(s) Chew daily PRN  sodium chloride 0.9% lock flush 10 milliLiter(s) IV Push every 1 hour PRN      REVIEW OF SYSTEMS  --------------------------------------------------------------------------------  Gen: No chills  Respiratory: No dyspnea, cough  CV: No chest pain  GI: No abdominal pain, diarrhea,  nausea, vomiting  MSK:  no edema  Neuro: No dizziness/lightheadedness      All other systems were reviewed and are negative, except as noted.    VITALS/PHYSICAL EXAM  --------------------------------------------------------------------------------  T(C): 36.1 (01-08-23 @ 08:00), Max: 36.7 (01-07-23 @ 20:00)  HR: 90 (01-08-23 @ 09:00) (88 - 110)  BP: --  RR: 19 (01-08-23 @ 09:00) (3 - 37)  SpO2: 100% (01-08-23 @ 09:00) (98% - 100%)  Wt(kg): --        01-07-23 @ 07:01  -  01-08-23 @ 07:00  --------------------------------------------------------  IN: 1965 mL / OUT: 2040 mL / NET: -75 mL    01-08-23 @ 07:01  -  01-08-23 @ 10:01  --------------------------------------------------------  IN: 225 mL / OUT: 0 mL / NET: 225 mL      Physical exam:   Gen: NAD  	HEENT: Anicteric  	Pulm: CTA b/l   	CV: S1S2+  	Abd: Soft, +BS      	Ext: no LE edema B/L  	Neuro: Awake  	Skin: Warm and dry  	Vascular access:     LABS/STUDIES  --------------------------------------------------------------------------------              9.3    13.57 >-----------<  140      [01-08-23 @ 00:22]              30.0     137  |  96  |  28  ----------------------------<  109      [01-08-23 @ 00:21]  3.7   |  26  |  1.95        Ca     9.2     [01-08-23 @ 00:21]      Mg     2.4     [01-08-23 @ 00:21]      Phos  2.7     [01-08-23 @ 00:21]    TPro  8.0  /  Alb  3.9  /  TBili  1.4  /  DBili  x   /  AST  28  /  ALT  16  /  AlkPhos  86  [01-08-23 @ 00:21]          Creatinine Trend:  SCr 1.95 [01-08 @ 00:21]  SCr 2.71 [01-07 @ 00:48]  SCr 3.80 [01-06 @ 00:38]  SCr 2.77 [01-05 @ 00:11]  SCr 3.71 [01-04 @ 00:27]          HBsAg Nonreact      [01-04-23 @ 10:02]     [facility] Division of Kidney Diseases & Hypertension  FOLLOW UP NOTE  [phone]--------------------------------------------------------------------------------  Chief Complaint:Acute pancreatitis without infection or necrosis        HPI: 63 y/o male with PMH of CABG, DM, HTN, HLD presenting as transfer from [place] for c/f STEMI. Course c/b gallstone pancreatitis leading to ARDS and shock & cardiac arrest now on [facility] ECMO. Had significant troponin elevation and his LVEF down to 8%. Pt was deemed to be too unstable to have an angiogram and potential PCI due to his co-morbidities & a new subdural bleed. Pt being seen for ERIC. SCr on arrival was 2.15; trended down to hector 1.6 on 11/25 and eventually increased to 3.95 11/28. Pt received IV diuretics as per CTU. Pt initiated on CRRT on 12/5/22 to optimize volume status and electrolytes.  Pt underwent decannulation of ECMO on 12/8/22. Pt was restarted on 12/9/22 for volume overload. Pt underwent mitral clip OR (12/16/22). s/p trach on 12/16. Decannulated on 1/8. Candidemia, on caspofungin per ID       24 hour events/subjective: Patient seen & examined. Labs & vitals reviewed.  Bp stable, remains off pressors. Last HD session 1/9 with 2L UF. CXR with improving b/l pulm vasc congestion on 1/10. Net even. Last Lasix 100 mg IVP on 1/8. Voided 125cc in 24 hrs. Bladder scan with 57 cc. Non tunneled cath removed overnight            PAST HISTORY  --------------------------------------------------------------------------------  No significant changes to PMH, PSH, FHx, SHx, unless otherwise noted    ALLERGIES & MEDICATIONS  --------------------------------------------------------------------------------  Allergies    No Known Allergies    Intolerances      Standing Inpatient Medications  aMIOdarone    Tablet 200 milliGRAM(s) Oral daily  aspirin  chewable 81 milliGRAM(s) Oral daily  atorvastatin 80 milliGRAM(s) Oral at bedtime  caspofungin IVPB 50 milliGRAM(s) IV Intermittent every 24 hours  caspofungin IVPB      chlorhexidine 2% Cloths 1 Application(s) Topical <User Schedule>  epoetin teena-epbx (RETACRIT) Injectable 5000 Unit(s) IV Push <User Schedule>  heparin   Injectable 5000 Unit(s) SubCutaneous every 8 hours  insulin lispro (ADMELOG) corrective regimen sliding scale   SubCutaneous every 6 hours  insulin NPH human recombinant 7 Unit(s) SubCutaneous every 6 hours  metoclopramide Injectable 5 milliGRAM(s) IV Push every 8 hours  metoprolol tartrate 12.5 milliGRAM(s) Oral every 12 hours  midodrine 10 milliGRAM(s) Oral every 8 hours  multivitamin/minerals/iron Oral Solution (CENTRUM) 15 milliLiter(s) Oral daily  pantoprazole  Injectable 40 milliGRAM(s) IV Push daily  polyethylene glycol 3350 17 Gram(s) Oral daily  senna 2 Tablet(s) Oral at bedtime  sevelamer carbonate Powder 800 milliGRAM(s) Oral three times a day with meals  thiamine 100 milliGRAM(s) Enteral Tube daily    PRN Inpatient Medications  acetaminophen    Suspension .. 650 milliGRAM(s) Oral every 6 hours PRN  simethicone 80 milliGRAM(s) Chew daily PRN  sodium chloride 0.9% lock flush 10 milliLiter(s) IV Push every 1 hour PRN      REVIEW OF SYSTEMS  --------------------------------------------------------------------------------  Gen: No chills  Respiratory: No dyspnea, cough  CV: No chest pain  GI: No abdominal pain, diarrhea,  nausea, vomiting  MSK:  no edema  Neuro: No dizziness/lightheadedness      All other systems were reviewed and are negative, except as noted.    VITALS/PHYSICAL EXAM  --------------------------------------------------------------------------------  T(C): 36.1 (01-08-23 @ 08:00), Max: 36.7 (01-07-23 @ 20:00)  HR: 90 (01-08-23 @ 09:00) (88 - 110)  BP: --  RR: 19 (01-08-23 @ 09:00) (3 - 37)  SpO2: 100% (01-08-23 @ 09:00) (98% - 100%)  Wt(kg): --        01-07-23 @ 07:01  -  01-08-23 @ 07:00  --------------------------------------------------------  IN: 1965 mL / OUT: 2040 mL / NET: -75 mL    01-08-23 @ 07:01  -  01-08-23 @ 10:01  --------------------------------------------------------  IN: 225 mL / OUT: 0 mL / NET: 225 mL      Physical exam:   Gen: NAD  	HEENT: Anicteric  	Pulm: CTA b/l   	CV: S1S2+  	Abd: Soft, +BS      	Ext: no LE edema B/L  	Neuro: Awake  	Skin: Warm and dry  	Vascular access:     LABS/STUDIES  --------------------------------------------------------------------------------              9.3    13.57 >-----------<  140      [01-08-23 @ 00:22]              30.0     137  |  96  |  28  ----------------------------<  109      [01-08-23 @ 00:21]  3.7   |  26  |  1.95        Ca     9.2     [01-08-23 @ 00:21]      Mg     2.4     [01-08-23 @ 00:21]      Phos  2.7     [01-08-23 @ 00:21]    TPro  8.0  /  Alb  3.9  /  TBili  1.4  /  DBili  x   /  AST  28  /  ALT  16  /  AlkPhos  86  [01-08-23 @ 00:21]          Creatinine Trend:  SCr 1.95 [01-08 @ 00:21]  SCr 2.71 [01-07 @ 00:48]  SCr 3.80 [01-06 @ 00:38]  SCr 2.77 [01-05 @ 00:11]  SCr 3.71 [01-04 @ 00:27]          HBsAg Nonreact      [01-04-23 @ 10:02]

## 2023-01-10 NOTE — PROGRESS NOTE ADULT - PROBLEM SELECTOR PLAN 1
- ischemic with most recent TTE 1/9 showing EF 25%  - unable to tolerate GDMT due to borderline BPs requiring midodrine, although now with improvement in BP  - recommend weaning midodrine (may only need prior to HD)  - volume management with iHD

## 2023-01-10 NOTE — SWALLOW FEES ASSESSMENT ADULT - RESIDUE IN PYRIFORM SINUSES
Trace Mild + multiple reflexive swallows reduces material to trace/Mild + multiple reflexive swallows reduces material to mild/Mild/Moderate

## 2023-01-10 NOTE — SWALLOW FEES ASSESSMENT ADULT - SPECIFY REASON(S)
to objectively evaluate pt's bayron-pharyngeal swallow mechanism. Per consult, "gallito decannulated 1/8."

## 2023-01-10 NOTE — PROGRESS NOTE ADULT - ATTENDING COMMENTS
#eric  ERIC/ ATN in the setting of STEMI, cardiac arrest & cardiogenic shock  was nonoliguric but now oliguric  hd yesterday monday  no urgent indication for uf/hd today  xray +congestion stable  #  neck lul has been d/c   plan for access   #VO/pul edema-stable   #mild hyperK-stable k today;     #hypotension-pressors per ct icu  #anemia - on MIGUEL, monitor hb trend  #hyperphos-cont renvela; check phos levels    d/w CT ICU

## 2023-01-10 NOTE — SWALLOW BEDSIDE ASSESSMENT ADULT - SLP GENERAL OBSERVATIONS
Pt was received OOB in azucena chair with daughter present. +room air, +KFT, +tele (VSS), +bandage over healing stoma (s/p recent decannulation 1/8). He was AAOx4, pleasant, and cooperative. Able to follow all complex commands and answer all questions for purposes of evaluation. Vocal quality and cough deemed strong. Intermittent leak speech from healing stoma site.

## 2023-01-10 NOTE — SWALLOW FEES ASSESSMENT ADULT - DIAGNOSTIC IMPRESSIONS
63 y/o M with prolonged and complicated hospital course, admitted 11/24/22 with a STEMI and cardiogenic shock, s/p VA ECMO decannulated 12/8, MR s/p Mitral clip x1 12/16, s/p IABP removal 12/17, and acute respiratory distress/failure s/p trach 12/16-1/8. Pt presents on FEES today with a mild pharyngeal phase dysphagia, likely acute related to aforementioned details. Oral phase is grossly functional for mastication of solids, bolus control, and AP transport. Pharyngeal trigger is timely. Airway protection is maintained for moderately thick/mildly thick liquids, purees, and solids. Aspiration across thin liquids visualized, which pt is sensate to and is effectively eliminated from subglottic and supraglottic region following reflexive cough. A chin tuck is effective in eliminating aspiration across exhaustive trials of thin liquids. Pharyngeal residue trace-minimal for all liquids, increased w/ solids that is effectively reduced given reflexive swallows. Patient is a candidate for compensatory strategies given intact cognition.     Disorders: reduced base of tongue retraction, reduced laryngeal vestibule closure 61 y/o M with prolonged and complicated hospital course, admitted 11/24/22 with a STEMI and cardiogenic shock, s/p VA ECMO decannulated 12/8, MR s/p Mitral clip x1 12/16, s/p IABP removal 12/17, and acute respiratory distress/failure s/p trach 12/16-1/8. Pt presents on FEES today with a mild pharyngeal phase dysphagia, likely acute related to aforementioned details. Oral phase is grossly functional for mastication of solids, bolus control, and AP transport. Pharyngeal trigger is timely. Airway protection is maintained for moderately thick/mildly thick liquids, purees, and solids. Aspiration across thin liquids visualized, which pt is sensate to and is effectively eliminated from subglottic and supraglottic region following reflexive cough. A chin tuck is effective in eliminating aspiration across exhaustive trials of thin liquids. Pharyngeal residue trace-minimal for all liquids, increased w/ solids that is effectively reduced given reflexive swallows. Patient is a candidate for compensatory strategies given intact cognition.     Disorders: reduced base of tongue retraction, reduced laryngeal vestibule closure

## 2023-01-10 NOTE — PROGRESS NOTE ADULT - PROBLEM SELECTOR PLAN 2
- Resolved  - ECMO decannulated 12/08 after successful wean with IABP and inotropes. IABP removed 12/17  - Had severe MR post ECMO removal which prevented appropriate weaning of IABP. Underwent mitral clip on 12/16 with reduction of MR to mild

## 2023-01-10 NOTE — SWALLOW FEES ASSESSMENT ADULT - ADDITIONAL RECOMMENDATIONS
Goals:  1. Pt/family/caregiver will demonstrate understanding and carryover of dysphagia management (safe swallow guidelines, compensatory strategies, dysphagia diet).  2. Pt will complete dysphagia exercises to improve swallow function.  3. Pt will tolerate recommended diet with no overt, clinical s/s of aspiration.    This service will continue to follow to monitor diet tolerance, swallow therapy, and candidacy for repeat instrumental assessment pending hospital course.

## 2023-01-10 NOTE — SWALLOW FEES ASSESSMENT ADULT - RECOMMENDED FEEDING/EATING TECHNIQUES
medications whole w/ applesauce./maintain upright posture during/after eating for 30 mins/position upright (90 degrees)/small sips/bites

## 2023-01-11 LAB
ALBUMIN SERPL ELPH-MCNC: 3.6 G/DL — SIGNIFICANT CHANGE UP (ref 3.3–5)
ALP SERPL-CCNC: 79 U/L — SIGNIFICANT CHANGE UP (ref 40–120)
ALT FLD-CCNC: 13 U/L — SIGNIFICANT CHANGE UP (ref 10–45)
ANION GAP SERPL CALC-SCNC: 16 MMOL/L — SIGNIFICANT CHANGE UP (ref 5–17)
APTT BLD: 30.6 SEC — SIGNIFICANT CHANGE UP (ref 27.5–35.5)
AST SERPL-CCNC: 18 U/L — SIGNIFICANT CHANGE UP (ref 10–40)
BILIRUB SERPL-MCNC: 0.8 MG/DL — SIGNIFICANT CHANGE UP (ref 0.2–1.2)
BUN SERPL-MCNC: 64 MG/DL — HIGH (ref 7–23)
CALCIUM SERPL-MCNC: 9.4 MG/DL — SIGNIFICANT CHANGE UP (ref 8.4–10.5)
CHLORIDE SERPL-SCNC: 96 MMOL/L — SIGNIFICANT CHANGE UP (ref 96–108)
CO2 SERPL-SCNC: 22 MMOL/L — SIGNIFICANT CHANGE UP (ref 22–31)
CREAT SERPL-MCNC: 4.12 MG/DL — HIGH (ref 0.5–1.3)
EGFR: 16 ML/MIN/1.73M2 — LOW
GAS PNL BLDA: SIGNIFICANT CHANGE UP
GLUCOSE BLDC GLUCOMTR-MCNC: 122 MG/DL — HIGH (ref 70–99)
GLUCOSE BLDC GLUCOMTR-MCNC: 127 MG/DL — HIGH (ref 70–99)
GLUCOSE SERPL-MCNC: 120 MG/DL — HIGH (ref 70–99)
HCT VFR BLD CALC: 29.3 % — LOW (ref 39–50)
HGB BLD-MCNC: 9.2 G/DL — LOW (ref 13–17)
INR BLD: 1 RATIO — SIGNIFICANT CHANGE UP (ref 0.88–1.16)
MAGNESIUM SERPL-MCNC: 3.1 MG/DL — HIGH (ref 1.6–2.6)
MCHC RBC-ENTMCNC: 30.4 PG — SIGNIFICANT CHANGE UP (ref 27–34)
MCHC RBC-ENTMCNC: 31.4 GM/DL — LOW (ref 32–36)
MCV RBC AUTO: 96.7 FL — SIGNIFICANT CHANGE UP (ref 80–100)
NRBC # BLD: 0 /100 WBCS — SIGNIFICANT CHANGE UP (ref 0–0)
PHOSPHATE SERPL-MCNC: 5.5 MG/DL — HIGH (ref 2.5–4.5)
PLATELET # BLD AUTO: 173 K/UL — SIGNIFICANT CHANGE UP (ref 150–400)
POTASSIUM SERPL-MCNC: 5.1 MMOL/L — SIGNIFICANT CHANGE UP (ref 3.5–5.3)
POTASSIUM SERPL-SCNC: 5.1 MMOL/L — SIGNIFICANT CHANGE UP (ref 3.5–5.3)
PROT SERPL-MCNC: 7.5 G/DL — SIGNIFICANT CHANGE UP (ref 6–8.3)
PROTHROM AB SERPL-ACNC: 11.5 SEC — SIGNIFICANT CHANGE UP (ref 10.5–13.4)
RBC # BLD: 3.03 M/UL — LOW (ref 4.2–5.8)
RBC # FLD: 19.1 % — HIGH (ref 10.3–14.5)
SARS-COV-2 RNA SPEC QL NAA+PROBE: SIGNIFICANT CHANGE UP
SODIUM SERPL-SCNC: 134 MMOL/L — LOW (ref 135–145)
WBC # BLD: 15.07 K/UL — HIGH (ref 3.8–10.5)
WBC # FLD AUTO: 15.07 K/UL — HIGH (ref 3.8–10.5)

## 2023-01-11 PROCEDURE — 76937 US GUIDE VASCULAR ACCESS: CPT | Mod: 26

## 2023-01-11 PROCEDURE — 36558 INSERT TUNNELED CV CATH: CPT

## 2023-01-11 PROCEDURE — 99291 CRITICAL CARE FIRST HOUR: CPT

## 2023-01-11 PROCEDURE — 99231 SBSQ HOSP IP/OBS SF/LOW 25: CPT

## 2023-01-11 PROCEDURE — 71045 X-RAY EXAM CHEST 1 VIEW: CPT | Mod: 26

## 2023-01-11 PROCEDURE — 99233 SBSQ HOSP IP/OBS HIGH 50: CPT | Mod: GC

## 2023-01-11 PROCEDURE — 77001 FLUOROGUIDE FOR VEIN DEVICE: CPT | Mod: 26

## 2023-01-11 PROCEDURE — 71045 X-RAY EXAM CHEST 1 VIEW: CPT | Mod: 26,77

## 2023-01-11 RX ORDER — ACETAMINOPHEN 500 MG
1000 TABLET ORAL ONCE
Refills: 0 | Status: COMPLETED | OUTPATIENT
Start: 2023-01-11 | End: 2023-01-11

## 2023-01-11 RX ORDER — CHLORHEXIDINE GLUCONATE 213 G/1000ML
1 SOLUTION TOPICAL
Refills: 0 | Status: DISCONTINUED | OUTPATIENT
Start: 2023-01-11 | End: 2023-01-20

## 2023-01-11 RX ADMIN — ATORVASTATIN CALCIUM 80 MILLIGRAM(S): 80 TABLET, FILM COATED ORAL at 21:18

## 2023-01-11 RX ADMIN — SEVELAMER CARBONATE 1600 MILLIGRAM(S): 2400 POWDER, FOR SUSPENSION ORAL at 21:18

## 2023-01-11 RX ADMIN — HEPARIN SODIUM 5000 UNIT(S): 5000 INJECTION INTRAVENOUS; SUBCUTANEOUS at 01:02

## 2023-01-11 RX ADMIN — PANTOPRAZOLE SODIUM 40 MILLIGRAM(S): 20 TABLET, DELAYED RELEASE ORAL at 11:29

## 2023-01-11 RX ADMIN — CHLORHEXIDINE GLUCONATE 1 APPLICATION(S): 213 SOLUTION TOPICAL at 05:02

## 2023-01-11 RX ADMIN — Medication 1000 MILLIGRAM(S): at 09:20

## 2023-01-11 RX ADMIN — ERYTHROPOIETIN 5000 UNIT(S): 10000 INJECTION, SOLUTION INTRAVENOUS; SUBCUTANEOUS at 20:47

## 2023-01-11 RX ADMIN — Medication 81 MILLIGRAM(S): at 11:29

## 2023-01-11 RX ADMIN — MIDODRINE HYDROCHLORIDE 20 MILLIGRAM(S): 2.5 TABLET ORAL at 22:08

## 2023-01-11 RX ADMIN — Medication 400 MILLIGRAM(S): at 08:50

## 2023-01-11 NOTE — PRE PROCEDURE NOTE - PRE PROCEDURE EVALUATION
Interventional Radiology Pre Procedure Note    HPI: 62y Male with ESRD needing long term dialysis access. Tunneled dialysis catheter placement is requested.     Allergies:   Medications (Abx/Cardiac/Anticoagulation/Blood Products)    aMIOdarone    Tablet: 200 milliGRAM(s) Oral (01-10 @ 05:27)  aspirin  chewable: 81 milliGRAM(s) Oral (01-11 @ 11:29)  caspofungin IVPB: 260 mL/Hr IV Intermittent (01-10 @ 18:36)  heparin   Injectable: 5000 Unit(s) SubCutaneous (01-11 @ 01:02)  metoprolol tartrate: 12.5 milliGRAM(s) Oral (01-10 @ 18:37)  midodrine: 10 milliGRAM(s) Oral (01-09 @ 21:12)  midodrine: 10 milliGRAM(s) Oral (01-09 @ 23:21)  midodrine: 20 milliGRAM(s) Oral (01-10 @ 21:36)    Data:  172.7  92.9  T(C): 35.6  HR: 101  BP: 124/54  RR: 21  SpO2: 94%    Exam  General: No acute distress  Chest: Non labored breathing    -WBC 15.07 / HgB 9.2 / Hct 29.3 / Plt 173  -Na 134 / Cl 96 / BUN 64 / Glucose 120  -K 5.1 / CO2 22 / Cr 4.12  -ALT 13 / Alk Phos 79 / T.Bili 0.8  -INR1.00    Plan: 62y Male presents for tunneled dialysis catheter placement. Procedure and risks discussed with patient and he is agreeable to proceed.   -Risks/Benefits/alternatives explained with the patient and/or healthcare proxy and witnessed informed consent obtained.

## 2023-01-11 NOTE — PROCEDURE NOTE - NSTIMEOUT_GEN_A_CORE
Patient's first and last name, , procedure, and correct site confirmed prior to the start of procedure.

## 2023-01-11 NOTE — PROGRESS NOTE ADULT - ATTENDING COMMENTS
#eric  ERIC/ ATN in the setting of STEMI, cardiac arrest & cardiogenic shock  hd monday  plan hd today  will need lul  xray +congestion    #VO/pul edema-stable   #mild hyperK-stable k today;     #hypotension-pressors per ct icu  #anemia - on MIGUEL, monitor hb trend  #hyperphos-cont renvela; check phos levels    d/w CT ICU

## 2023-01-11 NOTE — PROGRESS NOTE ADULT - SUBJECTIVE AND OBJECTIVE BOX
[facility] Division of Kidney Diseases & Hypertension  FOLLOW UP NOTE  [phone]--------------------------------------------------------------------------------  Chief Complaint:Acute pancreatitis without infection or necrosis      HPI: 63 y/o male with PMH of CABG, DM, HTN, HLD presenting as transfer from [place] for c/f STEMI. Course c/b gallstone pancreatitis leading to ARDS and shock & cardiac arrest now on [facility]. Had significant troponin elevation and his LVEF down to 8%. Pt was deemed to be too unstable to have an angiogram and potential PCI due to his co-morbidities & a new subdural bleed. Pt being seen for ERIC. SCr on arrival was 2.15; trended down to hector 1.6 on 11/25 and eventually increased to 3.95 11/28. Pt received IV diuretics as per CTU. Pt initiated on CRRT on 12/5/22 to optimize volume status and electrolytes.  Pt underwent decannulation of ECMO on 12/8/22. Pt was restarted on 12/9/22 for volume overload. Pt underwent mitral clip OR (12/16/22). s/p trach on 12/16. Decannulated on 1/8. Candidemia, on caspofungin per ID       24 hour events/subjective: Patient seen & examined. Labs & vitals reviewed.  Bp stable, remains off pressors. Last HD session 1/9 with 2L UF. CXR with stable b/l pulm vasc congestion on 1/10. Net +ve 500cc. Last Lasix 100 mg IVP on 1/8. Voided 100cc in 24 hrs.               PAST HISTORY  --------------------------------------------------------------------------------  No significant changes to PMH, PSH, FHx, SHx, unless otherwise noted    ALLERGIES & MEDICATIONS  --------------------------------------------------------------------------------  Allergies    No Known Allergies    Intolerances      Standing Inpatient Medications  aMIOdarone    Tablet 200 milliGRAM(s) Oral daily  aspirin  chewable 81 milliGRAM(s) Oral daily  atorvastatin 80 milliGRAM(s) Oral at bedtime  chlorhexidine 2% Cloths 1 Application(s) Topical <User Schedule>  epoetin teena-epbx (RETACRIT) Injectable 5000 Unit(s) IV Push <User Schedule>  heparin   Injectable 5000 Unit(s) SubCutaneous every 8 hours  insulin lispro (ADMELOG) corrective regimen sliding scale   SubCutaneous every 6 hours  insulin NPH human recombinant 7 Unit(s) SubCutaneous every 6 hours  metoprolol tartrate 12.5 milliGRAM(s) Oral every 12 hours  midodrine 20 milliGRAM(s) Oral every 8 hours  multivitamin/minerals/iron Oral Solution (CENTRUM) 15 milliLiter(s) Oral daily  pantoprazole  Injectable 40 milliGRAM(s) IV Push daily  polyethylene glycol 3350 17 Gram(s) Oral daily  senna 2 Tablet(s) Oral at bedtime  sevelamer carbonate 1600 milliGRAM(s) Oral every 8 hours  thiamine 100 milliGRAM(s) Enteral Tube daily    PRN Inpatient Medications  acetaminophen    Suspension .. 650 milliGRAM(s) Oral every 6 hours PRN  simethicone 80 milliGRAM(s) Chew daily PRN  sodium chloride 0.9% lock flush 10 milliLiter(s) IV Push every 1 hour PRN      REVIEW OF SYSTEMS  --------------------------------------------------------------------------------        All other systems were reviewed and are negative, except as noted.    VITALS/PHYSICAL EXAM  --------------------------------------------------------------------------------  T(C): 35.6 (01-11-23 @ 08:00), Max: 37.1 (01-11-23 @ 00:00)  HR: 98 (01-11-23 @ 09:00) (91 - 109)  BP: --  RR: 20 (01-11-23 @ 09:00) (10 - 34)  SpO2: 98% (01-11-23 @ 09:00) (92% - 100%)  Wt(kg): --        01-10-23 @ 07:01  -  01-11-23 @ 07:00  --------------------------------------------------------  IN: 710 mL / OUT: 125 mL / NET: 585 mL    01-11-23 @ 07:01  -  01-11-23 @ 09:25  --------------------------------------------------------  IN: 100 mL / OUT: 25 mL / NET: 75 mL      Physical Exam:  	Gen: NAD  	HEENT: Anicteric  	Pulm: CTA b/l   	CV: S1S2+  	Abd: Soft, +BS      	Ext: no LE edema B/L  	Neuro: Awake  	Skin: Warm and dry  	Vascular access:       LABS/STUDIES  --------------------------------------------------------------------------------              9.2    15.07 >-----------<  173      [01-11-23 @ 00:20]              29.3     134  |  96  |  64  ----------------------------<  120      [01-11-23 @ 00:20]  5.1   |  22  |  4.12        Ca     9.4     [01-11-23 @ 00:20]      Mg     3.1     [01-11-23 @ 00:20]      Phos  5.5     [01-11-23 @ 00:20]    TPro  7.5  /  Alb  3.6  /  TBili  0.8  /  DBili  x   /  AST  18  /  ALT  13  /  AlkPhos  79  [01-11-23 @ 00:20]    PT/INR: PT 11.5 , INR 1.00       [01-11-23 @ 00:20]  PTT: 30.6       [01-11-23 @ 00:20]      Creatinine Trend:  SCr 4.12 [01-11 @ 00:20]  SCr 2.81 [01-10 @ 00:36]  SCr 3.64 [01-09 @ 00:35]  SCr 1.95 [01-08 @ 00:21]  SCr 2.71 [01-07 @ 00:48]          HBsAg Nonreact      [01-04-23 @ 10:02]     [facility] Division of Kidney Diseases & Hypertension  FOLLOW UP NOTE  [phone]--------------------------------------------------------------------------------  Chief Complaint:Acute pancreatitis without infection or necrosis      HPI: 61 y/o male with PMH of CABG, DM, HTN, HLD presenting as transfer from [place] for c/f STEMI. Course c/b gallstone pancreatitis leading to ARDS and shock & cardiac arrest now on [facility]. Had significant troponin elevation and his LVEF down to 8%. Pt was deemed to be too unstable to have an angiogram and potential PCI due to his co-morbidities & a new subdural bleed. Pt being seen for ERIC. SCr on arrival was 2.15; trended down to hector 1.6 on 11/25 and eventually increased to 3.95 11/28. Pt received IV diuretics as per CTU. Pt initiated on CRRT on 12/5/22 to optimize volume status and electrolytes.  Pt underwent decannulation of ECMO on 12/8/22. Pt was restarted on 12/9/22 for volume overload. Pt underwent mitral clip OR (12/16/22). s/p trach on 12/16. Decannulated on 1/8. Candidemia, on caspofungin per ID       24 hour events/subjective: Patient seen & examined. Labs & vitals reviewed.  Bp stable, remains off pressors. Last HD session 1/9 with 2L UF. CXR with stable b/l pulm vasc congestion on 1/10. Net +ve 500cc. Last Lasix 100 mg IVP on 1/8. Voided 100cc in 24 hrs.               PAST HISTORY  --------------------------------------------------------------------------------  No significant changes to PMH, PSH, FHx, SHx, unless otherwise noted    ALLERGIES & MEDICATIONS  --------------------------------------------------------------------------------  Allergies    No Known Allergies    Intolerances      Standing Inpatient Medications  aMIOdarone    Tablet 200 milliGRAM(s) Oral daily  aspirin  chewable 81 milliGRAM(s) Oral daily  atorvastatin 80 milliGRAM(s) Oral at bedtime  chlorhexidine 2% Cloths 1 Application(s) Topical <User Schedule>  epoetin teena-epbx (RETACRIT) Injectable 5000 Unit(s) IV Push <User Schedule>  heparin   Injectable 5000 Unit(s) SubCutaneous every 8 hours  insulin lispro (ADMELOG) corrective regimen sliding scale   SubCutaneous every 6 hours  insulin NPH human recombinant 7 Unit(s) SubCutaneous every 6 hours  metoprolol tartrate 12.5 milliGRAM(s) Oral every 12 hours  midodrine 20 milliGRAM(s) Oral every 8 hours  multivitamin/minerals/iron Oral Solution (CENTRUM) 15 milliLiter(s) Oral daily  pantoprazole  Injectable 40 milliGRAM(s) IV Push daily  polyethylene glycol 3350 17 Gram(s) Oral daily  senna 2 Tablet(s) Oral at bedtime  sevelamer carbonate 1600 milliGRAM(s) Oral every 8 hours  thiamine 100 milliGRAM(s) Enteral Tube daily    PRN Inpatient Medications  acetaminophen    Suspension .. 650 milliGRAM(s) Oral every 6 hours PRN  simethicone 80 milliGRAM(s) Chew daily PRN  sodium chloride 0.9% lock flush 10 milliLiter(s) IV Push every 1 hour PRN      REVIEW OF SYSTEMS  --------------------------------------------------------------------------------        All other systems were reviewed and are negative, except as noted.    VITALS/PHYSICAL EXAM  --------------------------------------------------------------------------------  T(C): 35.6 (01-11-23 @ 08:00), Max: 37.1 (01-11-23 @ 00:00)  HR: 98 (01-11-23 @ 09:00) (91 - 109)  BP: --  RR: 20 (01-11-23 @ 09:00) (10 - 34)  SpO2: 98% (01-11-23 @ 09:00) (92% - 100%)  Wt(kg): --        01-10-23 @ 07:01  -  01-11-23 @ 07:00  --------------------------------------------------------  IN: 710 mL / OUT: 125 mL / NET: 585 mL    01-11-23 @ 07:01  -  01-11-23 @ 09:25  --------------------------------------------------------  IN: 100 mL / OUT: 25 mL / NET: 75 mL      Physical Exam:  	Gen: NAD  	HEENT: Anicteric  	Pulm: CTA b/l   	CV: S1S2+  	Abd: Soft, +BS      	Ext: no LE edema B/L  	Neuro: Awake  	Skin: Warm and dry  	Vascular access:       LABS/STUDIES  --------------------------------------------------------------------------------              9.2    15.07 >-----------<  173      [01-11-23 @ 00:20]              29.3     134  |  96  |  64  ----------------------------<  120      [01-11-23 @ 00:20]  5.1   |  22  |  4.12        Ca     9.4     [01-11-23 @ 00:20]      Mg     3.1     [01-11-23 @ 00:20]      Phos  5.5     [01-11-23 @ 00:20]    TPro  7.5  /  Alb  3.6  /  TBili  0.8  /  DBili  x   /  AST  18  /  ALT  13  /  AlkPhos  79  [01-11-23 @ 00:20]    PT/INR: PT 11.5 , INR 1.00       [01-11-23 @ 00:20]  PTT: 30.6       [01-11-23 @ 00:20]      Creatinine Trend:  SCr 4.12 [01-11 @ 00:20]  SCr 2.81 [01-10 @ 00:36]  SCr 3.64 [01-09 @ 00:35]  SCr 1.95 [01-08 @ 00:21]  SCr 2.71 [01-07 @ 00:48]          HBsAg Nonreact      [01-04-23 @ 10:02]     [facility] Division of Kidney Diseases & Hypertension  FOLLOW UP NOTE  [phone]--------------------------------------------------------------------------------  Chief Complaint:Acute pancreatitis without infection or necrosis      HPI: 63 y/o male with PMH of CABG, DM, HTN, HLD presenting as transfer from [place] for c/f STEMI. Course c/b gallstone pancreatitis leading to ARDS and shock & cardiac arrest now on [facility]. Had significant troponin elevation and his LVEF down to 8%. Pt was deemed to be too unstable to have an angiogram and potential PCI due to his co-morbidities & a new subdural bleed. Pt being seen for ERIC. SCr on arrival was 2.15; trended down to hector 1.6 on 11/25 and eventually increased to 3.95 11/28. Pt received IV diuretics as per CTU. Pt initiated on CRRT on 12/5/22 to optimize volume status and electrolytes.  Pt underwent decannulation of ECMO on 12/8/22. Pt was restarted on 12/9/22 for volume overload. Pt underwent mitral clip OR (12/16/22). s/p trach on 12/16. Decannulated on 1/8. Candidemia, on caspofungin per ID       24 hour events/subjective: Patient seen & examined. Labs & vitals reviewed.  Bp stable, remains off pressors. Last HD session 1/9 with 2L UF. CXR with stable b/l pulm vasc congestion on 1/10. Net +ve 500cc. Last Lasix 100 mg IVP on 1/8. Voided 100cc in 24 hrs.               PAST HISTORY  --------------------------------------------------------------------------------  No significant changes to PMH, PSH, FHx, SHx, unless otherwise noted    ALLERGIES & MEDICATIONS  --------------------------------------------------------------------------------  Allergies    No Known Allergies    Intolerances      Standing Inpatient Medications  aMIOdarone    Tablet 200 milliGRAM(s) Oral daily  aspirin  chewable 81 milliGRAM(s) Oral daily  atorvastatin 80 milliGRAM(s) Oral at bedtime  chlorhexidine 2% Cloths 1 Application(s) Topical <User Schedule>  epoetin teena-epbx (RETACRIT) Injectable 5000 Unit(s) IV Push <User Schedule>  heparin   Injectable 5000 Unit(s) SubCutaneous every 8 hours  insulin lispro (ADMELOG) corrective regimen sliding scale   SubCutaneous every 6 hours  insulin NPH human recombinant 7 Unit(s) SubCutaneous every 6 hours  metoprolol tartrate 12.5 milliGRAM(s) Oral every 12 hours  midodrine 20 milliGRAM(s) Oral every 8 hours  multivitamin/minerals/iron Oral Solution (CENTRUM) 15 milliLiter(s) Oral daily  pantoprazole  Injectable 40 milliGRAM(s) IV Push daily  polyethylene glycol 3350 17 Gram(s) Oral daily  senna 2 Tablet(s) Oral at bedtime  sevelamer carbonate 1600 milliGRAM(s) Oral every 8 hours  thiamine 100 milliGRAM(s) Enteral Tube daily    PRN Inpatient Medications  acetaminophen    Suspension .. 650 milliGRAM(s) Oral every 6 hours PRN  simethicone 80 milliGRAM(s) Chew daily PRN  sodium chloride 0.9% lock flush 10 milliLiter(s) IV Push every 1 hour PRN      REVIEW OF SYSTEMS  --------------------------------------------------------------------------------        All other systems were reviewed and are negative, except as noted.    VITALS/PHYSICAL EXAM  --------------------------------------------------------------------------------  T(C): 35.6 (01-11-23 @ 08:00), Max: 37.1 (01-11-23 @ 00:00)  HR: 98 (01-11-23 @ 09:00) (91 - 109)  BP: --  RR: 20 (01-11-23 @ 09:00) (10 - 34)  SpO2: 98% (01-11-23 @ 09:00) (92% - 100%)  Wt(kg): --        01-10-23 @ 07:01  -  01-11-23 @ 07:00  --------------------------------------------------------  IN: 710 mL / OUT: 125 mL / NET: 585 mL    01-11-23 @ 07:01  -  01-11-23 @ 09:25  --------------------------------------------------------  IN: 100 mL / OUT: 25 mL / NET: 75 mL      Physical Exam:  	Gen: NAD  	HEENT: Anicteric  	Pulm: CTA b/l   	CV: S1S2+  	Abd: Soft, +BS      	Ext: no LE edema B/L  	Neuro: Awake  	Skin: Warm and dry  	Vascular access:       LABS/STUDIES  --------------------------------------------------------------------------------              9.2    15.07 >-----------<  173      [01-11-23 @ 00:20]              29.3     134  |  96  |  64  ----------------------------<  120      [01-11-23 @ 00:20]  5.1   |  22  |  4.12        Ca     9.4     [01-11-23 @ 00:20]      Mg     3.1     [01-11-23 @ 00:20]      Phos  5.5     [01-11-23 @ 00:20]    TPro  7.5  /  Alb  3.6  /  TBili  0.8  /  DBili  x   /  AST  18  /  ALT  13  /  AlkPhos  79  [01-11-23 @ 00:20]    PT/INR: PT 11.5 , INR 1.00       [01-11-23 @ 00:20]  PTT: 30.6       [01-11-23 @ 00:20]      Creatinine Trend:  SCr 4.12 [01-11 @ 00:20]  SCr 2.81 [01-10 @ 00:36]  SCr 3.64 [01-09 @ 00:35]  SCr 1.95 [01-08 @ 00:21]  SCr 2.71 [01-07 @ 00:48]          HBsAg Nonreact      [01-04-23 @ 10:02]

## 2023-01-11 NOTE — PROGRESS NOTE ADULT - SUBJECTIVE AND OBJECTIVE BOX
CRITICAL CARE ATTENDING - CTICU    MEDICATIONS  (STANDING):  aMIOdarone    Tablet 200 milliGRAM(s) Oral daily  aspirin  chewable 81 milliGRAM(s) Oral daily  atorvastatin 80 milliGRAM(s) Oral at bedtime  caspofungin IVPB 50 milliGRAM(s) IV Intermittent every 24 hours  caspofungin IVPB      chlorhexidine 2% Cloths 1 Application(s) Topical <User Schedule>  epoetin teena-epbx (RETACRIT) Injectable 5000 Unit(s) IV Push <User Schedule>  heparin   Injectable 5000 Unit(s) SubCutaneous every 8 hours  insulin lispro (ADMELOG) corrective regimen sliding scale   SubCutaneous every 6 hours  insulin NPH human recombinant 7 Unit(s) SubCutaneous every 6 hours  metoprolol tartrate 12.5 milliGRAM(s) Oral every 12 hours  midodrine 20 milliGRAM(s) Oral every 8 hours  multivitamin/minerals/iron Oral Solution (CENTRUM) 15 milliLiter(s) Oral daily  pantoprazole  Injectable 40 milliGRAM(s) IV Push daily  polyethylene glycol 3350 17 Gram(s) Oral daily  senna 2 Tablet(s) Oral at bedtime  sevelamer carbonate 1600 milliGRAM(s) Oral every 8 hours  thiamine 100 milliGRAM(s) Enteral Tube daily                              9.2    15.07 )-----------( 173      ( 2023 00:20 )             29.3           134<L>  |  96  |  64<H>  ----------------------------<  120<H>  5.1   |  22  |  4.12<H>    Ca    9.4      2023 00:20  Phos  5.5       Mg     3.1         TPro  7.5  /  Alb  3.6  /  TBili  0.8  /  DBili  x   /  AST  18  /  ALT  13  /  AlkPhos  79        PT/INR - ( 2023 00:20 )   PT: 11.5 sec;   INR: 1.00 ratio         PTT - ( 2023 00:20 )  PTT:30.6 sec        Daily     Daily Weight in k.2 (2023 00:00)      -09 @ 07:01  -  -10 @ 07:00  --------------------------------------------------------  IN: 2934.2 mL / OUT: 3025 mL / NET: -90.8 mL    01-10 @ 07:01   @ 06:10  --------------------------------------------------------  IN: 710 mL / OUT: 100 mL / NET: 610 mL        Critically Ill patient  : [ ] preoperative ,   [x ] post operative    Requires :  [ x] Arterial Line   [ ] Central Line  [ ] PA catheter  [ ] IABP  [ ] ECMO  [ ] LVAD  [ ] Ventilator  [ ] pacemaker [ ] Impella.                      [ x] ABG's     [x ] Pulse Oxymetry Monitoring  Bedside evaluation , monitoring , treatment of hemodynamics , fluids , IVP/ IVCD meds.        Diagnosis:     Cardiogenic Shock / STEMI    Pancreatitis     s/p VA ECMO / IABP - Decannulated     Respiratory Failure  - resolved    Mitral Clip     Tracheostomy - decannulated      Renal Failure - Acute Kidney Injury     [ x] Hemodialysis - today  [ ] Hemofiltration:    [ x] negative fluid balance [ ] even fluid balance, in a hemodynamically unstable patient.     CHF- acute [ x]   chronic [ ]    systolic [x ]   diastolic [ ]  Valvular [ ]          - Echo- EF -   30%          [ ] RV dysfunction          - Cxr-cardiomegally, edema          - Clinical-  [ ]inotropes   [x]pressors - PO midodrine / Donny   [ ]diuresis   [ ]IABP   [ ]ECMO   [ ]LVAD   [ x]Respiratory Failure    Hypotension     Hemodynamic lability,  instability. Requires IVCD [ ] vasopressors [ ] inotropes  [ ] vasodilator  [ ]IVSS fluid  to maintain MAP, perfusion, C.I.     Tolerates NG / NJ feeds at [ x] goal rate    [ ] trophic rate    [ ]       rate     Requires bedside physical therapy, mobilization and total [facility] care.     candida in Blood x 1                  By signing my name below, I, Rosio Alonzo, attest that this documentation has been prepared under the direction and in the presence of Finn Zelaya MD.   Electronically Signed: Brian Burnett 23 @ 06:10      Discussed with CT surgeon, Physician Assistant - Nurse Practitioner- Critical care medicine team.   Discussed at  AM / PM rounds.   Chart, labs , films reviewed.    Cumulative Critical Care Time Given Today:  CRITICAL CARE ATTENDING - [facility]ICU    MEDICATIONS  (STANDING):  aMIOdarone    Tablet 200 milliGRAM(s) Oral daily  aspirin  chewable 81 milliGRAM(s) Oral daily  atorvastatin 80 milliGRAM(s) Oral at bedtime  caspofungin IVPB 50 milliGRAM(s) IV Intermittent every 24 hours  caspofungin IVPB      chlorhexidine 2% Cloths 1 Application(s) Topical <User Schedule>  epoetin teena-epbx (RETACRIT) Injectable 5000 Unit(s) IV Push <User Schedule>  heparin   Injectable 5000 Unit(s) SubCutaneous every 8 hours  insulin lispro (ADMELOG) corrective regimen sliding scale   SubCutaneous every 6 hours  insulin NPH human recombinant 7 Unit(s) SubCutaneous every 6 hours  metoprolol tartrate 12.5 milliGRAM(s) Oral every 12 hours  midodrine 20 milliGRAM(s) Oral every 8 hours  multivitamin/minerals/iron Oral Solution (CENTRUM) 15 milliLiter(s) Oral daily  pantoprazole  Injectable 40 milliGRAM(s) IV Push daily  polyethylene glycol 3350 17 Gram(s) Oral daily  senna 2 Tablet(s) Oral at bedtime  sevelamer carbonate 1600 milliGRAM(s) Oral every 8 hours  thiamine 100 milliGRAM(s) Enteral Tube daily                              9.2    15.07 )-----------( 173      ( 2023 00:20 )             29.3           134<L>  |  96  |  64<H>  ----------------------------<  120<H>  5.1   |  22  |  4.12<H>    Ca    9.4      2023 00:20  Phos  5.5       Mg     3.1         TPro  7.5  /  Alb  3.6  /  TBili  0.8  /  DBili  x   /  AST  18  /  ALT  13  /  AlkPhos  79        PT/INR - ( 2023 00:20 )   PT: 11.5 sec;   INR: 1.00 ratio         PTT - ( 2023 00:20 )  PTT:30.6 sec        Daily     Daily Weight in k.2 (2023 00:00)      -09 @ 07:01  -  -10 @ 07:00  --------------------------------------------------------  IN: 2934.2 mL / OUT: 3025 mL / NET: -90.8 mL    01-10 @ 07:01   @ 06:10  --------------------------------------------------------  IN: 710 mL / OUT: 100 mL / NET: 610 mL        Critically Ill patient  : [ ] preoperative ,   [x ] post operative    Requires :  [ x] Arterial Line   [ ] Central Line  [ ] PA catheter  [ ] IABP  [ ] ECMO  [ ] LVAD  [ ] Ventilator  [ ] pacemaker [ ] Impella.                      [ x] ABG's     [x ] Pulse Oxymetry Monitoring  Bedside evaluation , monitoring , treatment of hemodynamics , fluids , IVP/ IVCD meds.        Diagnosis:     Cardiogenic Shock / STEMI    Pancreatitis     s/p VA ECMO / IABP - Decannulated     Respiratory Failure  - resolved    Mitral Clip     Tracheostomy - decannulated      Renal Failure - Acute Kidney Injury     [ x] Hemodialysis - today  [ ] Hemofiltration:    [ x] negative fluid balance [ ] even fluid balance, in a hemodynamically unstable patient.     CHF- acute [ x]   chronic [ ]    systolic [x ]   diastolic [ ]  Valvular [ ]          - Echo- EF -   30%          [ ] RV dysfunction          - Cxr-cardiomegally, edema          - Clinical-  [ ]inotropes   [x]pressors - PO midodrine / Donny   [ ]diuresis   [ ]IABP   [ ]ECMO   [ ]LVAD   [ x]Respiratory Failure    Hypotension     Hemodynamic lability,  instability. Requires IVCD [ ] vasopressors [ ] inotropes  [ ] vasodilator  [ ]IVSS fluid  to maintain MAP, perfusion, C.I.     Tolerates NG / NJ feeds at [ x] goal rate    [ ] trophic rate    [ ]       rate     Requires bedside physical therapy, mobilization and total [facility] care.     candida in Blood x 1                  By signing my name below, I, Rosio Alonzo, attest that this documentation has been prepared under the direction and in the presence of Finn Zelaya MD.   Electronically Signed: Brian Burnett 23 @ 06:10      Discussed with CT surgeon, Physician Assistant - Nurse Practitioner- Critical care medicine team.   Discussed at  AM / PM rounds.   Chart, labs , films reviewed.    Cumulative Critical Care Time Given Today:  CRITICAL CARE ATTENDING - [facility]    MEDICATIONS  (STANDING):  aMIOdarone    Tablet 200 milliGRAM(s) Oral daily  aspirin  chewable 81 milliGRAM(s) Oral daily  atorvastatin 80 milliGRAM(s) Oral at bedtime  caspofungin IVPB 50 milliGRAM(s) IV Intermittent every 24 hours  caspofungin IVPB      chlorhexidine 2% Cloths 1 Application(s) Topical <User Schedule>  epoetin teena-epbx (RETACRIT) Injectable 5000 Unit(s) IV Push <User Schedule>  heparin   Injectable 5000 Unit(s) SubCutaneous every 8 hours  insulin lispro (ADMELOG) corrective regimen sliding scale   SubCutaneous every 6 hours  insulin NPH human recombinant 7 Unit(s) SubCutaneous every 6 hours  metoprolol tartrate 12.5 milliGRAM(s) Oral every 12 hours  midodrine 20 milliGRAM(s) Oral every 8 hours  multivitamin/minerals/iron Oral Solution (CENTRUM) 15 milliLiter(s) Oral daily  pantoprazole  Injectable 40 milliGRAM(s) IV Push daily  polyethylene glycol 3350 17 Gram(s) Oral daily  senna 2 Tablet(s) Oral at bedtime  sevelamer carbonate 1600 milliGRAM(s) Oral every 8 hours  thiamine 100 milliGRAM(s) Enteral Tube daily                              9.2    15.07 )-----------( 173      ( 2023 00:20 )             29.3           134<L>  |  96  |  64<H>  ----------------------------<  120<H>  5.1   |  22  |  4.12<H>    Ca    9.4      2023 00:20  Phos  5.5       Mg     3.1         TPro  7.5  /  Alb  3.6  /  TBili  0.8  /  DBili  x   /  AST  18  /  ALT  13  /  AlkPhos  79        PT/INR - ( 2023 00:20 )   PT: 11.5 sec;   INR: 1.00 ratio         PTT - ( 2023 00:20 )  PTT:30.6 sec        Daily     Daily Weight in k.2 (2023 00:00)      -09 @ 07:01  -  -10 @ 07:00  --------------------------------------------------------  IN: 2934.2 mL / OUT: 3025 mL / NET: -90.8 mL    01-10 @ 07:01   @ 06:10  --------------------------------------------------------  IN: 710 mL / OUT: 100 mL / NET: 610 mL        Critically Ill patient  : [ ] preoperative ,   [x ] post operative    Requires :  [ x] Arterial Line   [ ] Central Line  [ ] PA catheter  [ ] IABP  [ ] ECMO  [ ] LVAD  [ ] Ventilator  [ ] pacemaker [ ] Impella.                      [ x] ABG's     [x ] Pulse Oxymetry Monitoring  Bedside evaluation , monitoring , treatment of hemodynamics , fluids , IVP/ IVCD meds.        Diagnosis:     Cardiogenic Shock / STEMI    Pancreatitis     s/p VA ECMO / IABP - Decannulated     Respiratory Failure  - resolved    Mitral Clip     Tracheostomy - decannulated      Renal Failure - Acute Kidney Injury     [ x] Hemodialysis - today  [ ] Hemofiltration:    [ x] negative fluid balance [ ] even fluid balance, in a hemodynamically unstable patient.     CHF- acute [ x]   chronic [ ]    systolic [x ]   diastolic [ ]  Valvular [ ]          - Echo- EF -   30%          [ ] RV dysfunction          - Cxr-cardiomegally, edema          - Clinical-  [ ]inotropes   [x]pressors - PO midodrine / Donny   [ ]diuresis   [ ]IABP   [ ]ECMO   [ ]LVAD   [ x]Respiratory Failure    Hypotension     Hemodynamic lability,  instability. Requires IVCD [ ] vasopressors [ ] inotropes  [ ] vasodilator  [ ]IVSS fluid  to maintain MAP, perfusion, C.I.     Tolerates NG / NJ feeds at [ x] goal rate    [ ] trophic rate    [ ]       rate     Requires bedside physical therapy, mobilization and total [facility] care.     candida in Blood x 1                  By signing my name below, I, Rosio Alonzo, attest that this documentation has been prepared under the direction and in the presence of Finn Zelaya MD.   Electronically Signed: Brian Burnett 23 @ 06:10      Discussed with CT surgeon, Physician Assistant - Nurse Practitioner- Critical care medicine team.   Discussed at  AM / PM rounds.   Chart, labs , films reviewed.    Cumulative Critical Care Time Given Today:  CRITICAL CARE ATTENDING - [facility]    MEDICATIONS  (STANDING):  aMIOdarone    Tablet 200 milliGRAM(s) Oral daily  aspirin  chewable 81 milliGRAM(s) Oral daily  atorvastatin 80 milliGRAM(s) Oral at bedtime  caspofungin IVPB 50 milliGRAM(s) IV Intermittent every 24 hours  caspofungin IVPB      chlorhexidine 2% Cloths 1 Application(s) Topical <User Schedule>  epoetin teena-epbx (RETACRIT) Injectable 5000 Unit(s) IV Push <User Schedule>  heparin   Injectable 5000 Unit(s) SubCutaneous every 8 hours  insulin lispro (ADMELOG) corrective regimen sliding scale   SubCutaneous every 6 hours  insulin NPH human recombinant 7 Unit(s) SubCutaneous every 6 hours  metoprolol tartrate 12.5 milliGRAM(s) Oral every 12 hours  midodrine 20 milliGRAM(s) Oral every 8 hours  multivitamin/minerals/iron Oral Solution (CENTRUM) 15 milliLiter(s) Oral daily  pantoprazole  Injectable 40 milliGRAM(s) IV Push daily  polyethylene glycol 3350 17 Gram(s) Oral daily  senna 2 Tablet(s) Oral at bedtime  sevelamer carbonate 1600 milliGRAM(s) Oral every 8 hours  thiamine 100 milliGRAM(s) Enteral Tube daily                              9.2    15.07 )-----------( 173      ( 2023 00:20 )             29.3           134<L>  |  96  |  64<H>  ----------------------------<  120<H>  5.1   |  22  |  4.12<H>    Ca    9.4      2023 00:20  Phos  5.5       Mg     3.1         TPro  7.5  /  Alb  3.6  /  TBili  0.8  /  DBili  x   /  AST  18  /  ALT  13  /  AlkPhos  79        PT/INR - ( 2023 00:20 )   PT: 11.5 sec;   INR: 1.00 ratio         PTT - ( 2023 00:20 )  PTT:30.6 sec        Daily     Daily Weight in k.2 (2023 00:00)      -09 @ 07:01  -  -10 @ 07:00  --------------------------------------------------------  IN: 2934.2 mL / OUT: 3025 mL / NET: -90.8 mL    01-10 @ 07:01   @ 06:10  --------------------------------------------------------  IN: 710 mL / OUT: 100 mL / NET: 610 mL        Critically Ill patient  : [ ] preoperative ,   [x ] post operative    Requires :  [ x] Arterial Line   [ ] Central Line  [ ] PA catheter  [ ] IABP  [ ] ECMO  [ ] LVAD  [ ] Ventilator  [ ] pacemaker [ ] Impella.                      [ x] ABG's     [x ] Pulse Oxymetry Monitoring  Bedside evaluation , monitoring , treatment of hemodynamics , fluids , IVP/ IVCD meds.        Diagnosis:     Cardiogenic Shock / STEMI    Pancreatitis     s/p VA ECMO / IABP - Decannulated     Respiratory Failure  - resolved    Mitral Clip     Tracheostomy - decannulated      Renal Failure - Acute Kidney Injury     [ x] Hemodialysis - today  [ ] Hemofiltration:    [ x] negative fluid balance [ ] even fluid balance, in a hemodynamically unstable patient.     CHF- acute [ x]   chronic [ ]    systolic [x ]   diastolic [ ]  Valvular [ ]          - Echo- EF -   30%          [ ] RV dysfunction          - Cxr-cardiomegally, edema          - Clinical-  [ ]inotropes   [x]pressors - PO midodrine / Donny   [ ]diuresis   [ ]IABP   [ ]ECMO   [ ]LVAD   [ ]Respiratory Failure    Hypotension     Hemodynamic lability,  instability. Requires  [x ] vasopressors [ ] inotropes  [ ] vasodilator  [ ]IVSS fluid  to maintain MAP, perfusion, C.I.     Tolerates NG / NJ feeds at [ x] goal rate    [ ] trophic rate    [ ]       rate     Requires bedside physical therapy, mobilization and total [facility] care.     candida in Blood x 1                  By signing my name below, I, Rosio Alonzo, attest that this documentation has been prepared under the direction and in the presence of Finn Zelaya MD.   Electronically Signed: Brian Burnett 23 @ 06:10    I, Finn Zelaya, personally performed the services described in this documentation. All medical record entries made by the scribe were at my direction and in my presence. I have reviewed the chart and agree that the record reflects my personal performance and is accurate and complete.   Finn Zelaya MD.       Discussed with CT surgeon, Physician Assistant - Nurse Practitioner- Critical care medicine team.   Discussed at  AM / PM rounds.   Chart, labs , films reviewed.    Cumulative Critical Care Time Given Today:  30 min CRITICAL CARE ATTENDING - [facility]ICU    MEDICATIONS  (STANDING):  aMIOdarone    Tablet 200 milliGRAM(s) Oral daily  aspirin  chewable 81 milliGRAM(s) Oral daily  atorvastatin 80 milliGRAM(s) Oral at bedtime  caspofungin IVPB 50 milliGRAM(s) IV Intermittent every 24 hours  caspofungin IVPB      chlorhexidine 2% Cloths 1 Application(s) Topical <User Schedule>  epoetin teena-epbx (RETACRIT) Injectable 5000 Unit(s) IV Push <User Schedule>  heparin   Injectable 5000 Unit(s) SubCutaneous every 8 hours  insulin lispro (ADMELOG) corrective regimen sliding scale   SubCutaneous every 6 hours  insulin NPH human recombinant 7 Unit(s) SubCutaneous every 6 hours  metoprolol tartrate 12.5 milliGRAM(s) Oral every 12 hours  midodrine 20 milliGRAM(s) Oral every 8 hours  multivitamin/minerals/iron Oral Solution (CENTRUM) 15 milliLiter(s) Oral daily  pantoprazole  Injectable 40 milliGRAM(s) IV Push daily  polyethylene glycol 3350 17 Gram(s) Oral daily  senna 2 Tablet(s) Oral at bedtime  sevelamer carbonate 1600 milliGRAM(s) Oral every 8 hours  thiamine 100 milliGRAM(s) Enteral Tube daily                              9.2    15.07 )-----------( 173      ( 2023 00:20 )             29.3           134<L>  |  96  |  64<H>  ----------------------------<  120<H>  5.1   |  22  |  4.12<H>    Ca    9.4      2023 00:20  Phos  5.5       Mg     3.1         TPro  7.5  /  Alb  3.6  /  TBili  0.8  /  DBili  x   /  AST  18  /  ALT  13  /  AlkPhos  79        PT/INR - ( 2023 00:20 )   PT: 11.5 sec;   INR: 1.00 ratio         PTT - ( 2023 00:20 )  PTT:30.6 sec        Daily     Daily Weight in k.2 (2023 00:00)      -09 @ 07:01  -  -10 @ 07:00  --------------------------------------------------------  IN: 2934.2 mL / OUT: 3025 mL / NET: -90.8 mL    01-10 @ 07:01   @ 06:10  --------------------------------------------------------  IN: 710 mL / OUT: 100 mL / NET: 610 mL        Critically Ill patient  : [ ] preoperative ,   [x ] post operative    Requires :  [ x] Arterial Line   [ ] Central Line  [ ] PA catheter  [ ] IABP  [ ] ECMO  [ ] LVAD  [ ] Ventilator  [ ] pacemaker [ ] Impella.                      [ x] ABG's     [x ] Pulse Oxymetry Monitoring  Bedside evaluation , monitoring , treatment of hemodynamics , fluids , IVP/ IVCD meds.        Diagnosis:     Cardiogenic Shock / STEMI    Pancreatitis     s/p VA ECMO / IABP - Decannulated     Respiratory Failure  - resolved    Mitral Clip     Tracheostomy - decannulated      Renal Failure - Acute Kidney Injury     [ x] Hemodialysis - today  [ ] Hemofiltration:    [ x] negative fluid balance [ ] even fluid balance, in a hemodynamically unstable patient.     CHF- acute [ x]   chronic [ ]    systolic [x ]   diastolic [ ]  Valvular [ ]          - Echo- EF -   30%          [ ] RV dysfunction          - Cxr-cardiomegally, edema          - Clinical-  [ ]inotropes   [x]pressors - PO midodrine / Donny   [ ]diuresis   [ ]IABP   [ ]ECMO   [ ]LVAD   [ ]Respiratory Failure    Hypotension     Hemodynamic lability,  instability. Requires  [x ] vasopressors [ ] inotropes  [ ] vasodilator  [ ]IVSS fluid  to maintain MAP, perfusion, C.I.     Tolerates NG / NJ feeds at [ x] goal rate    [ ] trophic rate    [ ]       rate     Requires bedside physical therapy, mobilization and total senior care care.     candida in Blood x 1                  By signing my name below, I, Rosio Alonzo, attest that this documentation has been prepared under the direction and in the presence of Finn Zelaya MD.   Electronically Signed: Brian Burnett 23 @ 06:10    I, Finn Zelaya, personally performed the services described in this documentation. All medical record entries made by the scribe were at my direction and in my presence. I have reviewed the chart and agree that the record reflects my personal performance and is accurate and complete.   Finn Zelaya MD.       Discussed with CT surgeon, Physician Assistant - Nurse Practitioner- Critical care medicine team.   Discussed at  AM / PM rounds.   Chart, labs , films reviewed.    Cumulative Critical Care Time Given Today:  30 min CRITICAL CARE ATTENDING - [facility]ICU    MEDICATIONS  (STANDING):  aMIOdarone    Tablet 200 milliGRAM(s) Oral daily  aspirin  chewable 81 milliGRAM(s) Oral daily  atorvastatin 80 milliGRAM(s) Oral at bedtime  caspofungin IVPB 50 milliGRAM(s) IV Intermittent every 24 hours  caspofungin IVPB      chlorhexidine 2% Cloths 1 Application(s) Topical <User Schedule>  epoetin teena-epbx (RETACRIT) Injectable 5000 Unit(s) IV Push <User Schedule>  heparin   Injectable 5000 Unit(s) SubCutaneous every 8 hours  insulin lispro (ADMELOG) corrective regimen sliding scale   SubCutaneous every 6 hours  insulin NPH human recombinant 7 Unit(s) SubCutaneous every 6 hours  metoprolol tartrate 12.5 milliGRAM(s) Oral every 12 hours  midodrine 20 milliGRAM(s) Oral every 8 hours  multivitamin/minerals/iron Oral Solution (CENTRUM) 15 milliLiter(s) Oral daily  pantoprazole  Injectable 40 milliGRAM(s) IV Push daily  polyethylene glycol 3350 17 Gram(s) Oral daily  senna 2 Tablet(s) Oral at bedtime  sevelamer carbonate 1600 milliGRAM(s) Oral every 8 hours  thiamine 100 milliGRAM(s) Enteral Tube daily                              9.2    15.07 )-----------( 173      ( 2023 00:20 )             29.3           134<L>  |  96  |  64<H>  ----------------------------<  120<H>  5.1   |  22  |  4.12<H>    Ca    9.4      2023 00:20  Phos  5.5       Mg     3.1         TPro  7.5  /  Alb  3.6  /  TBili  0.8  /  DBili  x   /  AST  18  /  ALT  13  /  AlkPhos  79        PT/INR - ( 2023 00:20 )   PT: 11.5 sec;   INR: 1.00 ratio         PTT - ( 2023 00:20 )  PTT:30.6 sec        Daily     Daily Weight in k.2 (2023 00:00)      -09 @ 07:01  -  -10 @ 07:00  --------------------------------------------------------  IN: 2934.2 mL / OUT: 3025 mL / NET: -90.8 mL    01-10 @ 07:01   @ 06:10  --------------------------------------------------------  IN: 710 mL / OUT: 100 mL / NET: 610 mL        Critically Ill patient  : [ ] preoperative ,   [x ] post operative    Requires :  [ x] Arterial Line   [ ] Central Line  [ ] PA catheter  [ ] IABP  [ ] ECMO  [ ] LVAD  [ ] Ventilator  [ ] pacemaker [ ] Impella.                      [ x] ABG's     [x ] Pulse Oxymetry Monitoring  Bedside evaluation , monitoring , treatment of hemodynamics , fluids , IVP/ IVCD meds.        Diagnosis:     Cardiogenic Shock / STEMI    Pancreatitis     s/p VA ECMO / IABP - Decannulated     Respiratory Failure  - resolved    Mitral Clip     Tracheostomy - decannulated      Renal Failure - Acute Kidney Injury     [ x] Hemodialysis - today  [ ] Hemofiltration:    [ x] negative fluid balance [ ] even fluid balance, in a hemodynamically unstable patient.     CHF- acute [ x]   chronic [ ]    systolic [x ]   diastolic [ ]  Valvular [ ]          - Echo- EF -   30%          [ ] RV dysfunction          - Cxr-cardiomegally, edema          - Clinical-  [ ]inotropes   [x]pressors - PO midodrine / Donny   [ ]diuresis   [ ]IABP   [ ]ECMO   [ ]LVAD   [ ]Respiratory Failure    Hypotension     Hemodynamic lability,  instability. Requires  [x ] vasopressors [ ] inotropes  [ ] vasodilator  [ ]IVSS fluid  to maintain MAP, perfusion, C.I.     Tolerates NG / NJ feeds at [ x] goal rate    [ ] trophic rate    [ ]       rate     Requires bedside physical therapy, mobilization and total group home care.     candida in Blood x 1                  By signing my name below, I, Rosio Alonzo, attest that this documentation has been prepared under the direction and in the presence of Finn Zelaya MD.   Electronically Signed: Brian Burnett 23 @ 06:10    I, Finn Zelaya, personally performed the services described in this documentation. All medical record entries made by the scribe were at my direction and in my presence. I have reviewed the chart and agree that the record reflects my personal performance and is accurate and complete.   Finn Zelaya MD.       Discussed with CT surgeon, Physician Assistant - Nurse Practitioner- Critical care medicine team.   Discussed at  AM / PM rounds.   Chart, labs , films reviewed.    Cumulative Critical Care Time Given Today:  30 min

## 2023-01-11 NOTE — PROGRESS NOTE ADULT - ASSESSMENT
62M CAD s/p CABG, DM, HTN, presented as a trasnfer from [place] for STEMI, found to have pancreatitis not necrotizing and cholecystitis on CT AP, admitted to to SICU for respiratory failure due to ARDS. Moved to the CTU for ECMO for cardiogenic shock vs ARDS, ECMO decannulated on 12/8 with IABP placement and mitral clip and removed on 12/17, s/p trac on 12/16/22, now on trach collar.    #Candidemia  Unclear source  Bcx positive for candida tropicalis on 12/26/22  Repeat with no growth to date  Limited TTE shows no evidence for valvular vegetations, mitral clip in place    #ESRD on HD  Nephrology following    shiley in place      Continue caspofungin 50mg daily   The JUAN of organism is on the high side against diflucan    isolated CNS in BC is a contaminate  eye exam down the line if needed      no contraindication  to PermCath  can proceed today  dc casp  completed 2 weeks and is doing well                                          62M CAD s/p CABG, DM, HTN, presented as a trasnfer from [place] for STEMI, found to have pancreatitis not necrotizing and cholecystitis on CT AP, admitted to to SICU for respiratory failure due to ARDS. Moved to the CTU for ECMO for cardiogenic shock vs ARDS, ECMO decannulated on 12/8 with IABP placement and mitral clip and removed on 12/17, s/p trac on 12/16/22, now on trach collar.    #Candidemia  Unclear source  Bcx positive for candida tropicalis on 12/26/22  Repeat with no growth to date  Limited TTE shows no evidence for valvular vegetations, mitral clip in place    #ESRD on HD  Nephrology following    shiley in place      Continue caspofungin 50mg daily   The JUAN of organism is on the high side against diflucan    isolated CNS in BC is a contaminate  eye exam down the line if needed      no contraindication  to PermCath  can proceed today  dc casp  completed 2 weeks and is doing well                                          62M CAD s/p CABG, DM, HTN, presented as a trasnfer from [place] for STEMI, found to have pancreatitis not necrotizing and cholecystitis on CT AP, admitted to to SICU for respiratory failure due to ARDS. Moved to the CTU for ECMO for cardiogenic shock vs ARDS, ECMO decannulated on 12/8 with IABP placement and mitral clip and removed on 12/17, s/p trac on 12/16/22, now on trach collar.    #Candidemia  Unclear source  Bcx positive for candida tropicalis on 12/26/22  Repeat with no growth to date  Limited TTE shows no evidence for valvular vegetations, mitral clip in place    #ESRD on HD  Nephrology following    shiley in place      Continue caspofungin 50mg daily   The JUAN of organism is on the high side against diflucan    isolated CNS in BC is a contaminate  eye exam down the line if needed      no contraindication  to PermCath  can proceed today  dc casp  completed 2 weeks and is doing well

## 2023-01-11 NOTE — PROCEDURE NOTE - NSPROCNAME_GEN_A_CORE
Bronchoscopy
Central Line Insertion
Arterial Puncture/Cannulation
Arterial Puncture/Cannulation
Central Line Insertion
Bronchoscopy
Central Line Insertion
Arterial Puncture/Cannulation
Bronchoscopy
Arterial Puncture/Cannulation
Central Line Insertion
Arterial Puncture/Cannulation
General
Central Line Insertion
Interventional Radiology
Urinary Device Placement
Central Line Insertion
Arterial Puncture/Cannulation
Arterial Puncture/Cannulation
Central Line Insertion
Tracheal Intubation
Central Line Insertion
Central Line Insertion

## 2023-01-11 NOTE — PROCEDURE NOTE - PROCEDURE DATE TIME, MLM
08-Dec-2022 15:15
18-Dec-2022 02:00
26-Dec-2022
25-Nov-2022 12:33
25-Nov-2022 09:47
30-Nov-2022 11:30
26-Nov-2022 14:00
28-Nov-2022 15:58
01-Dec-2022 12:00
25-Nov-2022 18:57
25-Nov-2022 19:21
10-Dec-2022 15:00
02-Jan-2023
02-Jan-2023 01:03
11-Jan-2023 18:39
11-Dec-2022
26-Dec-2022 04:00
10-Dec-2022 15:00
26-Dec-2022 08:35
18-Dec-2022 02:30
20-Dec-2022 16:00
25-Nov-2022 17:04
21-Nov-2022 17:30
03-Dec-2022 16:00
25-Nov-2022 12:35

## 2023-01-11 NOTE — PROGRESS NOTE ADULT - PROBLEM SELECTOR PLAN 1
Pt with non oliguric ERIC/ ATN in the setting of STEMI, cardiac arrest & cardiogenic shock  SCr on arrival was 2.15; trended down to hector 1.6 on 11/25; slowly trended up & increased to 3.95 11/28. Pt initiated on CRRT/CVVHDF to optimize volume and electrolytes on 12/5/22. Pt likely with ATN. Pt underwent decannulation of ECMO on 12/8/22 and was taken off CRRT.     Pt reinitiated on CRRT overnight on 12/9/22 for volume overload. s/p trach on 12/16. CRRT stopped again 12/19 & initiated on iHD.     Getting daily HD/UF.  Decannulated on 1/8, now on RA. Bp stable, remains off pressors. Last HD session 1/9 with 2L UF. CXR with stable b/l pulm vasc congestion on 1/10. Net +ve 500cc. Last Lasix 100 mg IVP on 1/8. Voided 100cc in 24 hrs. Will need a non tunneled cath placed for HD today.  Continue to monitor for renal recovery. Monitor labs and urine output. Avoid any potential nephrotoxins. Dose medications as per HD. Continue sevelamer. Phos stable. Candidemia, Unclear source. Repeat with no growth to date. TTE-ve for valvular vegetations. On caspofungin per ID

## 2023-01-11 NOTE — PROGRESS NOTE ADULT - SUBJECTIVE AND OBJECTIVE BOX
infectious diseases progress note:    Patient is a 62y old  Male who presents with a chief complaint of Acute cholecystitis, gallstone pancreatitis (11 Jan 2023 06:10)        Acute pancreatitis without infection or necrosis               Allergies    No Known Allergies    Intolerances        ANTIBIOTICS/RELEVANT:  antimicrobials  caspofungin IVPB 50 milliGRAM(s) IV Intermittent every 24 hours  caspofungin IVPB        immunologic:  epoetin teena-epbx (RETACRIT) Injectable 5000 Unit(s) IV Push <User Schedule>    OTHER:  acetaminophen    Suspension .. 650 milliGRAM(s) Oral every 6 hours PRN  acetaminophen   IVPB .. 1000 milliGRAM(s) IV Intermittent once  aMIOdarone    Tablet 200 milliGRAM(s) Oral daily  aspirin  chewable 81 milliGRAM(s) Oral daily  atorvastatin 80 milliGRAM(s) Oral at bedtime  chlorhexidine 2% Cloths 1 Application(s) Topical <User Schedule>  heparin   Injectable 5000 Unit(s) SubCutaneous every 8 hours  insulin lispro (ADMELOG) corrective regimen sliding scale   SubCutaneous every 6 hours  insulin NPH human recombinant 7 Unit(s) SubCutaneous every 6 hours  metoprolol tartrate 12.5 milliGRAM(s) Oral every 12 hours  midodrine 20 milliGRAM(s) Oral every 8 hours  multivitamin/minerals/iron Oral Solution (CENTRUM) 15 milliLiter(s) Oral daily  pantoprazole  Injectable 40 milliGRAM(s) IV Push daily  polyethylene glycol 3350 17 Gram(s) Oral daily  senna 2 Tablet(s) Oral at bedtime  sevelamer carbonate 1600 milliGRAM(s) Oral every 8 hours  simethicone 80 milliGRAM(s) Chew daily PRN  sodium chloride 0.9% lock flush 10 milliLiter(s) IV Push every 1 hour PRN  thiamine 100 milliGRAM(s) Enteral Tube daily      Objective:  Vital Signs Last 24 Hrs  T(C): 35.6 (11 Jan 2023 08:00), Max: 37.1 (11 Jan 2023 00:00)  T(F): 96 (11 Jan 2023 08:00), Max: 98.8 (11 Jan 2023 00:00)  HR: 100 (11 Jan 2023 08:00) (91 - 109)  BP: --  BP(mean): --  RR: 25 (11 Jan 2023 08:00) (10 - 34)  SpO2: 92% (11 Jan 2023 08:00) (92% - 100%)    Parameters below as of 11 Jan 2023 00:00  Patient On (Oxygen Delivery Method): nasal cannula  O2 Flow (L/min): 2         Eyes:INOCENCIO, EOMI  Ear/Nose/Throat: no oral lesion, no sinus tenderness on percussion	  Neck:no JVD, no lymphadenopathy, supple  Respiratory: CTA lore  Cardiovascular: S1S2 RRR, no murmurs  Gastrointestinal:soft, (+) BS, no HSM  Extremities:no e/e/c        LABS:                        9.2    15.07 )-----------( 173      ( 11 Jan 2023 00:20 )             29.3     01-11    134<L>  |  96  |  64<H>  ----------------------------<  120<H>  5.1   |  22  |  4.12<H>    Ca    9.4      11 Jan 2023 00:20  Phos  5.5     01-11  Mg     3.1     01-11    TPro  7.5  /  Alb  3.6  /  TBili  0.8  /  DBili  x   /  AST  18  /  ALT  13  /  AlkPhos  79  01-11    PT/INR - ( 11 Jan 2023 00:20 )   PT: 11.5 sec;   INR: 1.00 ratio         PTT - ( 11 Jan 2023 00:20 )  PTT:30.6 sec        MICROBIOLOGY:    RECENT CULTURES:        RESPIRATORY CULTURES:              RADIOLOGY & ADDITIONAL STUDIES:        Pager [phone]  After 5 pm/weekends or if no response :[phone] infectious diseases progress note:    Patient is a 62y old  Male who presents with a chief complaint of Acute cholecystitis, gallstone pancreatitis (11 Jan 2023 06:10)        Acute pancreatitis without infection or necrosis               Allergies    No Known Allergies    Intolerances        ANTIBIOTICS/RELEVANT:  antimicrobials  caspofungin IVPB 50 milliGRAM(s) IV Intermittent every 24 hours  caspofungin IVPB        immunologic:  epoetin teena-epbx (RETACRIT) Injectable 5000 Unit(s) IV Push <User Schedule>    OTHER:  acetaminophen    Suspension .. 650 milliGRAM(s) Oral every 6 hours PRN  acetaminophen   IVPB .. 1000 milliGRAM(s) IV Intermittent once  aMIOdarone    Tablet 200 milliGRAM(s) Oral daily  aspirin  chewable 81 milliGRAM(s) Oral daily  atorvastatin 80 milliGRAM(s) Oral at bedtime  chlorhexidine 2% Cloths 1 Application(s) Topical <User Schedule>  heparin   Injectable 5000 Unit(s) SubCutaneous every 8 hours  insulin lispro (ADMELOG) corrective regimen sliding scale   SubCutaneous every 6 hours  insulin NPH human recombinant 7 Unit(s) SubCutaneous every 6 hours  metoprolol tartrate 12.5 milliGRAM(s) Oral every 12 hours  midodrine 20 milliGRAM(s) Oral every 8 hours  multivitamin/minerals/iron Oral Solution (CENTRUM) 15 milliLiter(s) Oral daily  pantoprazole  Injectable 40 milliGRAM(s) IV Push daily  polyethylene glycol 3350 17 Gram(s) Oral daily  senna 2 Tablet(s) Oral at bedtime  sevelamer carbonate 1600 milliGRAM(s) Oral every 8 hours  simethicone 80 milliGRAM(s) Chew daily PRN  sodium chloride 0.9% lock flush 10 milliLiter(s) IV Push every 1 hour PRN  thiamine 100 milliGRAM(s) Enteral Tube daily      Objective:  Vital Signs Last 24 Hrs  T(C): 35.6 (11 Jan 2023 08:00), Max: 37.1 (11 Jan 2023 00:00)  T(F): 96 (11 Jan 2023 08:00), Max: 98.8 (11 Jan 2023 00:00)  HR: 100 (11 Jan 2023 08:00) (91 - 109)  BP: --  BP(mean): --  RR: 25 (11 Jan 2023 08:00) (10 - 34)  SpO2: 92% (11 Jan 2023 08:00) (92% - 100%)    Parameters below as of 11 Jan 2023 00:00  Patient On (Oxygen Delivery Method): nasal cannula  O2 Flow (L/min): 2         Eyes:INOCENCIO, EOMI  Ear/Nose/Throat: no oral lesion, no sinus tenderness on percussion	  Neck:no JVD, no lymphadenopathy, supple  Respiratory: CTA lore  Cardiovascular: S1S2 RRR, no murmurs  Gastrointestinal:soft, (+) BS, no HSM  Extremities:no e/e/c        LABS:                        9.2    15.07 )-----------( 173      ( 11 Jan 2023 00:20 )             29.3     01-11    134<L>  |  96  |  64<H>  ----------------------------<  120<H>  5.1   |  22  |  4.12<H>    Ca    9.4      11 Jan 2023 00:20  Phos  5.5     01-11  Mg     3.1     01-11    TPro  7.5  /  Alb  3.6  /  TBili  0.8  /  DBili  x   /  AST  18  /  ALT  13  /  AlkPhos  79  01-11    PT/INR - ( 11 Jan 2023 00:20 )   PT: 11.5 sec;   INR: 1.00 ratio         PTT - ( 11 Jan 2023 00:20 )  PTT:30.6 sec        MICROBIOLOGY:    RECENT CULTURES:        RESPIRATORY CULTURES:              RADIOLOGY & ADDITIONAL STUDIES:        Pager [phone]  After 5 pm/weekends or if no response :[phone] infectious diseases progress note:    Patient is a 62y old  Male who presents with a chief complaint of Acute cholecystitis, gallstone pancreatitis (11 Jan 2023 06:10)        Acute pancreatitis without infection or necrosis               Allergies    No Known Allergies    Intolerances        ANTIBIOTICS/RELEVANT:  antimicrobials  caspofungin IVPB 50 milliGRAM(s) IV Intermittent every 24 hours  caspofungin IVPB        immunologic:  epoetin teena-epbx (RETACRIT) Injectable 5000 Unit(s) IV Push <User Schedule>    OTHER:  acetaminophen    Suspension .. 650 milliGRAM(s) Oral every 6 hours PRN  acetaminophen   IVPB .. 1000 milliGRAM(s) IV Intermittent once  aMIOdarone    Tablet 200 milliGRAM(s) Oral daily  aspirin  chewable 81 milliGRAM(s) Oral daily  atorvastatin 80 milliGRAM(s) Oral at bedtime  chlorhexidine 2% Cloths 1 Application(s) Topical <User Schedule>  heparin   Injectable 5000 Unit(s) SubCutaneous every 8 hours  insulin lispro (ADMELOG) corrective regimen sliding scale   SubCutaneous every 6 hours  insulin NPH human recombinant 7 Unit(s) SubCutaneous every 6 hours  metoprolol tartrate 12.5 milliGRAM(s) Oral every 12 hours  midodrine 20 milliGRAM(s) Oral every 8 hours  multivitamin/minerals/iron Oral Solution (CENTRUM) 15 milliLiter(s) Oral daily  pantoprazole  Injectable 40 milliGRAM(s) IV Push daily  polyethylene glycol 3350 17 Gram(s) Oral daily  senna 2 Tablet(s) Oral at bedtime  sevelamer carbonate 1600 milliGRAM(s) Oral every 8 hours  simethicone 80 milliGRAM(s) Chew daily PRN  sodium chloride 0.9% lock flush 10 milliLiter(s) IV Push every 1 hour PRN  thiamine 100 milliGRAM(s) Enteral Tube daily      Objective:  Vital Signs Last 24 Hrs  T(C): 35.6 (11 Jan 2023 08:00), Max: 37.1 (11 Jan 2023 00:00)  T(F): 96 (11 Jan 2023 08:00), Max: 98.8 (11 Jan 2023 00:00)  HR: 100 (11 Jan 2023 08:00) (91 - 109)  BP: --  BP(mean): --  RR: 25 (11 Jan 2023 08:00) (10 - 34)  SpO2: 92% (11 Jan 2023 08:00) (92% - 100%)    Parameters below as of 11 Jan 2023 00:00  Patient On (Oxygen Delivery Method): nasal cannula  O2 Flow (L/min): 2         Eyes:INOCENCIO, EOMI  Ear/Nose/Throat: no oral lesion, no sinus tenderness on percussion	  Neck:no JVD, no lymphadenopathy, supple  Respiratory: CTA lore  Cardiovascular: S1S2 RRR, no murmurs  Gastrointestinal:soft, (+) BS, no HSM  Extremities:no e/e/c        LABS:                        9.2    15.07 )-----------( 173      ( 11 Jan 2023 00:20 )             29.3     01-11    134<L>  |  96  |  64<H>  ----------------------------<  120<H>  5.1   |  22  |  4.12<H>    Ca    9.4      11 Jan 2023 00:20  Phos  5.5     01-11  Mg     3.1     01-11    TPro  7.5  /  Alb  3.6  /  TBili  0.8  /  DBili  x   /  AST  18  /  ALT  13  /  AlkPhos  79  01-11    PT/INR - ( 11 Jan 2023 00:20 )   PT: 11.5 sec;   INR: 1.00 ratio         PTT - ( 11 Jan 2023 00:20 )  PTT:30.6 sec        MICROBIOLOGY:    RECENT CULTURES:        RESPIRATORY CULTURES:              RADIOLOGY & ADDITIONAL STUDIES:        Pager [phone]  After 5 pm/weekends or if no response :[phone]

## 2023-01-11 NOTE — PROCEDURE NOTE - NSINFORMCONSENT_GEN_A_CORE
This was an emergent procedure.
This was an emergent procedure.
Benefits, risks, and possible complications of procedure explained to patient/caregiver who verbalized understanding and gave written consent.
This was an emergent procedure.
This was an emergent procedure.
Benefits, risks, and possible complications of procedure explained to patient/caregiver who verbalized understanding and gave verbal consent.
Benefits, risks, and possible complications of procedure explained to patient/caregiver who verbalized understanding and gave verbal consent.
This was an emergent procedure.
Benefits, risks, and possible complications of procedure explained to patient/caregiver who verbalized understanding and gave verbal consent.
This was an emergent procedure.
This was an emergent procedure.
Benefits, risks, and possible complications of procedure explained to patient/caregiver who verbalized understanding and gave verbal consent.
This was an emergent procedure.
This was an emergent procedure.
Benefits, risks, and possible complications of procedure explained to patient/caregiver who verbalized understanding and gave written consent.
Benefits, risks, and possible complications of procedure explained to patient/caregiver who verbalized understanding and gave written consent.
This was an emergent procedure.
telephone consent/This was an emergent procedure.
This was an emergent procedure.
Benefits, risks, and possible complications of procedure explained to patient/caregiver who verbalized understanding and gave verbal consent.
This was an emergent procedure.
This was an emergent procedure.

## 2023-01-11 NOTE — PROCEDURE NOTE - NSPERFORMEDBY_GEN_A_CORE
Myself
Myself
Myself/Attending
Myself

## 2023-01-12 LAB
ALBUMIN SERPL ELPH-MCNC: 3.9 G/DL — SIGNIFICANT CHANGE UP (ref 3.3–5)
ALP SERPL-CCNC: 97 U/L — SIGNIFICANT CHANGE UP (ref 40–120)
ALT FLD-CCNC: 14 U/L — SIGNIFICANT CHANGE UP (ref 10–45)
ANION GAP SERPL CALC-SCNC: 17 MMOL/L — SIGNIFICANT CHANGE UP (ref 5–17)
AST SERPL-CCNC: 21 U/L — SIGNIFICANT CHANGE UP (ref 10–40)
BILIRUB SERPL-MCNC: 1.1 MG/DL — SIGNIFICANT CHANGE UP (ref 0.2–1.2)
BUN SERPL-MCNC: 28 MG/DL — HIGH (ref 7–23)
CALCIUM SERPL-MCNC: 9.4 MG/DL — SIGNIFICANT CHANGE UP (ref 8.4–10.5)
CHLORIDE SERPL-SCNC: 94 MMOL/L — LOW (ref 96–108)
CO2 SERPL-SCNC: 23 MMOL/L — SIGNIFICANT CHANGE UP (ref 22–31)
CREAT SERPL-MCNC: 2.1 MG/DL — HIGH (ref 0.5–1.3)
EGFR: 35 ML/MIN/1.73M2 — LOW
GAS PNL BLDA: SIGNIFICANT CHANGE UP
GLUCOSE BLDC GLUCOMTR-MCNC: 136 MG/DL — HIGH (ref 70–99)
GLUCOSE BLDC GLUCOMTR-MCNC: 162 MG/DL — HIGH (ref 70–99)
GLUCOSE BLDC GLUCOMTR-MCNC: 170 MG/DL — HIGH (ref 70–99)
GLUCOSE BLDC GLUCOMTR-MCNC: 190 MG/DL — HIGH (ref 70–99)
GLUCOSE SERPL-MCNC: 142 MG/DL — HIGH (ref 70–99)
HCT VFR BLD CALC: 31.5 % — LOW (ref 39–50)
HGB BLD-MCNC: 9.7 G/DL — LOW (ref 13–17)
MAGNESIUM SERPL-MCNC: 2.4 MG/DL — SIGNIFICANT CHANGE UP (ref 1.6–2.6)
MCHC RBC-ENTMCNC: 29.7 PG — SIGNIFICANT CHANGE UP (ref 27–34)
MCHC RBC-ENTMCNC: 30.8 GM/DL — LOW (ref 32–36)
MCV RBC AUTO: 96.3 FL — SIGNIFICANT CHANGE UP (ref 80–100)
NRBC # BLD: 0 /100 WBCS — SIGNIFICANT CHANGE UP (ref 0–0)
PHOSPHATE SERPL-MCNC: 2.9 MG/DL — SIGNIFICANT CHANGE UP (ref 2.5–4.5)
PLATELET # BLD AUTO: 148 K/UL — LOW (ref 150–400)
POTASSIUM SERPL-MCNC: 3.3 MMOL/L — LOW (ref 3.5–5.3)
POTASSIUM SERPL-SCNC: 3.3 MMOL/L — LOW (ref 3.5–5.3)
PROT SERPL-MCNC: 8.3 G/DL — SIGNIFICANT CHANGE UP (ref 6–8.3)
RBC # BLD: 3.27 M/UL — LOW (ref 4.2–5.8)
RBC # FLD: 18.8 % — HIGH (ref 10.3–14.5)
SODIUM SERPL-SCNC: 134 MMOL/L — LOW (ref 135–145)
WBC # BLD: 12.58 K/UL — HIGH (ref 3.8–10.5)
WBC # FLD AUTO: 12.58 K/UL — HIGH (ref 3.8–10.5)

## 2023-01-12 PROCEDURE — 99231 SBSQ HOSP IP/OBS SF/LOW 25: CPT

## 2023-01-12 PROCEDURE — 99233 SBSQ HOSP IP/OBS HIGH 50: CPT | Mod: GC

## 2023-01-12 PROCEDURE — 99291 CRITICAL CARE FIRST HOUR: CPT

## 2023-01-12 RX ORDER — BUMETANIDE 0.25 MG/ML
4 INJECTION INTRAMUSCULAR; INTRAVENOUS ONCE
Refills: 0 | Status: COMPLETED | OUTPATIENT
Start: 2023-01-12 | End: 2023-01-12

## 2023-01-12 RX ORDER — POTASSIUM CHLORIDE 20 MEQ
20 PACKET (EA) ORAL ONCE
Refills: 0 | Status: COMPLETED | OUTPATIENT
Start: 2023-01-12 | End: 2023-01-12

## 2023-01-12 RX ORDER — METOPROLOL TARTRATE 50 MG
25 TABLET ORAL
Refills: 0 | Status: DISCONTINUED | OUTPATIENT
Start: 2023-01-12 | End: 2023-01-15

## 2023-01-12 RX ADMIN — Medication 25 MILLIGRAM(S): at 17:34

## 2023-01-12 RX ADMIN — HUMAN INSULIN 7 UNIT(S): 100 INJECTION, SUSPENSION SUBCUTANEOUS at 12:14

## 2023-01-12 RX ADMIN — Medication 81 MILLIGRAM(S): at 12:15

## 2023-01-12 RX ADMIN — Medication 15 MILLILITER(S): at 12:14

## 2023-01-12 RX ADMIN — HUMAN INSULIN 7 UNIT(S): 100 INJECTION, SUSPENSION SUBCUTANEOUS at 17:34

## 2023-01-12 RX ADMIN — MIDODRINE HYDROCHLORIDE 20 MILLIGRAM(S): 2.5 TABLET ORAL at 06:27

## 2023-01-12 RX ADMIN — PANTOPRAZOLE SODIUM 40 MILLIGRAM(S): 20 TABLET, DELAYED RELEASE ORAL at 12:15

## 2023-01-12 RX ADMIN — Medication 200 MILLIGRAM(S): at 22:14

## 2023-01-12 RX ADMIN — CHLORHEXIDINE GLUCONATE 1 APPLICATION(S): 213 SOLUTION TOPICAL at 06:21

## 2023-01-12 RX ADMIN — ATORVASTATIN CALCIUM 80 MILLIGRAM(S): 80 TABLET, FILM COATED ORAL at 22:15

## 2023-01-12 RX ADMIN — HEPARIN SODIUM 5000 UNIT(S): 5000 INJECTION INTRAVENOUS; SUBCUTANEOUS at 08:24

## 2023-01-12 RX ADMIN — HUMAN INSULIN 7 UNIT(S): 100 INJECTION, SUSPENSION SUBCUTANEOUS at 06:23

## 2023-01-12 RX ADMIN — Medication 100 MILLIGRAM(S): at 22:14

## 2023-01-12 RX ADMIN — Medication 100 MILLIGRAM(S): at 12:14

## 2023-01-12 RX ADMIN — Medication 100 MILLIGRAM(S): at 13:19

## 2023-01-12 RX ADMIN — HEPARIN SODIUM 5000 UNIT(S): 5000 INJECTION INTRAVENOUS; SUBCUTANEOUS at 00:27

## 2023-01-12 RX ADMIN — HUMAN INSULIN 7 UNIT(S): 100 INJECTION, SUSPENSION SUBCUTANEOUS at 00:40

## 2023-01-12 RX ADMIN — SEVELAMER CARBONATE 1600 MILLIGRAM(S): 2400 POWDER, FOR SUSPENSION ORAL at 22:14

## 2023-01-12 RX ADMIN — MIDODRINE HYDROCHLORIDE 20 MILLIGRAM(S): 2.5 TABLET ORAL at 13:21

## 2023-01-12 RX ADMIN — Medication 3: at 06:24

## 2023-01-12 RX ADMIN — SEVELAMER CARBONATE 1600 MILLIGRAM(S): 2400 POWDER, FOR SUSPENSION ORAL at 13:21

## 2023-01-12 RX ADMIN — AMIODARONE HYDROCHLORIDE 200 MILLIGRAM(S): 400 TABLET ORAL at 06:15

## 2023-01-12 RX ADMIN — SEVELAMER CARBONATE 1600 MILLIGRAM(S): 2400 POWDER, FOR SUSPENSION ORAL at 06:15

## 2023-01-12 RX ADMIN — Medication 100 MILLIGRAM(S): at 09:49

## 2023-01-12 RX ADMIN — Medication 3: at 12:13

## 2023-01-12 RX ADMIN — Medication 3: at 17:34

## 2023-01-12 RX ADMIN — MIDODRINE HYDROCHLORIDE 20 MILLIGRAM(S): 2.5 TABLET ORAL at 22:15

## 2023-01-12 RX ADMIN — Medication 200 MILLIGRAM(S): at 08:23

## 2023-01-12 RX ADMIN — BUMETANIDE 132 MILLIGRAM(S): 0.25 INJECTION INTRAMUSCULAR; INTRAVENOUS at 15:00

## 2023-01-12 RX ADMIN — HEPARIN SODIUM 5000 UNIT(S): 5000 INJECTION INTRAVENOUS; SUBCUTANEOUS at 17:32

## 2023-01-12 RX ADMIN — Medication 20 MILLIEQUIVALENT(S): at 08:24

## 2023-01-12 RX ADMIN — SENNA PLUS 2 TABLET(S): 8.6 TABLET ORAL at 22:14

## 2023-01-12 NOTE — PROGRESS NOTE ADULT - ATTENDING COMMENTS
#eric  ERIC/ ATN in the setting of STEMI, cardiac arrest & cardiogenic shock  hd yesterday  no urgent indication for hd today  xray +congestion  stable  #VO/pul edema-stable   #mild hyperK-stable k today;     #hypotension-pressors per ct icu  #anemia - on MIGUEL, monitor hb trend  #hyperphos-cont renvela; check phos levels    d/w CT ICU

## 2023-01-12 NOTE — PROGRESS NOTE ADULT - PROBLEM SELECTOR PLAN 1
Pt with non oliguric ERIC/ ATN in the setting of STEMI, cardiac arrest & cardiogenic shock  SCr on arrival was 2.15; trended down to hector 1.6 on 11/25; slowly trended up & increased to 3.95 11/28. Pt initiated on CRRT/CVVHDF to optimize volume and electrolytes on 12/5/22. Pt likely with ATN. Pt underwent decannulation of ECMO on 12/8/22 and was taken off CRRT.     Pt reinitiated on CRRT overnight on 12/9/22 for volume overload. s/p trach on 12/16. CRRT stopped again 12/19 & initiated on iHD. Decannulated on 1/8, now on RA. Bp stable, remains off pressors.     Completed Caspo for fungemia. s/p RIJ tunneled HD cath on 1/11. Bp stable, remains off pressors. Last HD session 1/11 with 2L UF. CXR with stable b/l pulm vasc congestion. Net -ve 2L . Voided 300cc in 24 hrs. Continue to monitor for renal recovery. Hold off on HD today. Monitor labs and urine output. Avoid any potential nephrotoxins. Dose medications as per HD.  Phos 2.9. Decrease sevelamer to 800 tid with meals. K 3.3. Replace K with 20 meq KCL.

## 2023-01-12 NOTE — PROGRESS NOTE ADULT - SUBJECTIVE AND OBJECTIVE BOX
infectious diseases progress note:    Patient is a 62y old  Male who presents with a chief complaint of Acute cholecystitis, gallstone pancreatitis (12 Jan 2023 07:51)        Acute pancreatitis without infection or necrosis          ROS:  CONSTITUTIONAL:  Negative fever or chills, feels well, good appetite  EYES:  Negative  blurry vision or double vision  CARDIOVASCULAR:  Negative for chest pain or palpitations  RESPIRATORY:  Negative for cough, wheezing, or SOB   GASTROINTESTINAL:  Negative for nausea, vomiting, diarrhea, constipation, or abdominal pain  GENITOURINARY:  Negative frequency, urgency or dysuria  NEUROLOGIC:  No headache, confusion, dizziness, lightheadedness    Allergies    No Known Allergies    Intolerances        ANTIBIOTICS/RELEVANT:  antimicrobials    immunologic:  epoetin teena-epbx (RETACRIT) Injectable 5000 Unit(s) IV Push <User Schedule>    OTHER:  acetaminophen    Suspension .. 650 milliGRAM(s) Oral every 6 hours PRN  aMIOdarone    Tablet 200 milliGRAM(s) Oral daily  aspirin  chewable 81 milliGRAM(s) Oral daily  atorvastatin 80 milliGRAM(s) Oral at bedtime  benzonatate 100 milliGRAM(s) Oral every 8 hours  chlorhexidine 2% Cloths 1 Application(s) Topical <User Schedule>  chlorhexidine 4% Liquid 1 Application(s) Topical <User Schedule>  guaiFENesin Oral Liquid (Sugar-Free) 200 milliGRAM(s) Oral every 6 hours PRN  heparin   Injectable 5000 Unit(s) SubCutaneous every 8 hours  hydrocodone/homatropine Syrup 5 milliLiter(s) Oral every 8 hours PRN  insulin lispro (ADMELOG) corrective regimen sliding scale   SubCutaneous every 6 hours  insulin NPH human recombinant 7 Unit(s) SubCutaneous every 6 hours  metoprolol tartrate 12.5 milliGRAM(s) Oral every 12 hours  midodrine 20 milliGRAM(s) Oral every 8 hours  multivitamin/minerals/iron Oral Solution (CENTRUM) 15 milliLiter(s) Oral daily  pantoprazole  Injectable 40 milliGRAM(s) IV Push daily  polyethylene glycol 3350 17 Gram(s) Oral daily  senna 2 Tablet(s) Oral at bedtime  sevelamer carbonate 1600 milliGRAM(s) Oral every 8 hours  simethicone 80 milliGRAM(s) Chew daily PRN  sodium chloride 0.9% lock flush 10 milliLiter(s) IV Push every 1 hour PRN  thiamine 100 milliGRAM(s) Enteral Tube daily      Objective:  Vital Signs Last 24 Hrs  T(C): 36.6 (12 Jan 2023 04:00), Max: 36.6 (12 Jan 2023 04:00)  T(F): 97.9 (12 Jan 2023 04:00), Max: 97.9 (12 Jan 2023 04:00)  HR: 104 (12 Jan 2023 07:00) (93 - 118)  BP: 109/61 (12 Jan 2023 06:00) (109/61 - 124/54)  BP(mean): 79 (12 Jan 2023 06:00) (78 - 79)  RR: 14 (12 Jan 2023 07:00) (1 - 34)  SpO2: 97% (12 Jan 2023 07:00) (93% - 100%)    Parameters below as of 12 Jan 2023 05:00  Patient On (Oxygen Delivery Method): room air           Eyes:INOCENCIO, EOMI  Ear/Nose/Throat: no oral lesion, no sinus tenderness on percussion	  Neck:no JVD, no lymphadenopathy, supple  Respiratory: CTA lore  Cardiovascular: S1S2 RRR, no murmurs  Gastrointestinal:soft, (+) BS, no HSM  Extremities:no e/e/c        LABS:                        9.7    12.58 )-----------( 148      ( 12 Jan 2023 00:28 )             31.5     01-12    134<L>  |  94<L>  |  28<H>  ----------------------------<  142<H>  3.3<L>   |  23  |  2.10<H>    Ca    9.4      12 Jan 2023 00:19  Phos  2.9     01-12  Mg     2.4     01-12    TPro  8.3  /  Alb  3.9  /  TBili  1.1  /  DBili  x   /  AST  21  /  ALT  14  /  AlkPhos  97  01-12    PT/INR - ( 11 Jan 2023 00:20 )   PT: 11.5 sec;   INR: 1.00 ratio         PTT - ( 11 Jan 2023 00:20 )  PTT:30.6 sec        MICROBIOLOGY:    RECENT CULTURES:        RESPIRATORY CULTURES:              RADIOLOGY & ADDITIONAL STUDIES:        Pager [phone]  After 5 pm/weekends or if no response :[phone] infectious diseases progress note:    Patient is a 62y old  Male who presents with a chief complaint of Acute cholecystitis, gallstone pancreatitis (12 Jan 2023 07:51)        Acute pancreatitis without infection or necrosis          ROS:  CONSTITUTIONAL:  Negative fever or chills, feels well, good appetite  EYES:  Negative  blurry vision or double vision  CARDIOVASCULAR:  Negative for chest pain or palpitations  RESPIRATORY:  Negative for cough, wheezing, or SOB   GASTROINTESTINAL:  Negative for nausea, vomiting, diarrhea, constipation, or abdominal pain  GENITOURINARY:  Negative frequency, urgency or dysuria  NEUROLOGIC:  No headache, confusion, dizziness, lightheadedness    Allergies    No Known Allergies    Intolerances        ANTIBIOTICS/RELEVANT:  antimicrobials    immunologic:  epoetin teena-epbx (RETACRIT) Injectable 5000 Unit(s) IV Push <User Schedule>    OTHER:  acetaminophen    Suspension .. 650 milliGRAM(s) Oral every 6 hours PRN  aMIOdarone    Tablet 200 milliGRAM(s) Oral daily  aspirin  chewable 81 milliGRAM(s) Oral daily  atorvastatin 80 milliGRAM(s) Oral at bedtime  benzonatate 100 milliGRAM(s) Oral every 8 hours  chlorhexidine 2% Cloths 1 Application(s) Topical <User Schedule>  chlorhexidine 4% Liquid 1 Application(s) Topical <User Schedule>  guaiFENesin Oral Liquid (Sugar-Free) 200 milliGRAM(s) Oral every 6 hours PRN  heparin   Injectable 5000 Unit(s) SubCutaneous every 8 hours  hydrocodone/homatropine Syrup 5 milliLiter(s) Oral every 8 hours PRN  insulin lispro (ADMELOG) corrective regimen sliding scale   SubCutaneous every 6 hours  insulin NPH human recombinant 7 Unit(s) SubCutaneous every 6 hours  metoprolol tartrate 12.5 milliGRAM(s) Oral every 12 hours  midodrine 20 milliGRAM(s) Oral every 8 hours  multivitamin/minerals/iron Oral Solution (CENTRUM) 15 milliLiter(s) Oral daily  pantoprazole  Injectable 40 milliGRAM(s) IV Push daily  polyethylene glycol 3350 17 Gram(s) Oral daily  senna 2 Tablet(s) Oral at bedtime  sevelamer carbonate 1600 milliGRAM(s) Oral every 8 hours  simethicone 80 milliGRAM(s) Chew daily PRN  sodium chloride 0.9% lock flush 10 milliLiter(s) IV Push every 1 hour PRN  thiamine 100 milliGRAM(s) Enteral Tube daily      Objective:  Vital Signs Last 24 Hrs  T(C): 36.6 (12 Jan 2023 04:00), Max: 36.6 (12 Jan 2023 04:00)  T(F): 97.9 (12 Jan 2023 04:00), Max: 97.9 (12 Jan 2023 04:00)  HR: 104 (12 Jan 2023 07:00) (93 - 118)  BP: 109/61 (12 Jan 2023 06:00) (109/61 - 124/54)  BP(mean): 79 (12 Jan 2023 06:00) (78 - 79)  RR: 14 (12 Jan 2023 07:00) (1 - 34)  SpO2: 97% (12 Jan 2023 07:00) (93% - 100%)    Parameters below as of 12 Jan 2023 05:00  Patient On (Oxygen Delivery Method): room air           Eyes:INOCENCIO, EOMI  Ear/Nose/Throat: no oral lesion, no sinus tenderness on percussion	  Neck:no JVD, no lymphadenopathy, supple  Respiratory: CTA lore  Cardiovascular: S1S2 RRR, no murmurs  Gastrointestinal:soft, (+) BS, no HSM  Extremities:no e/e/c        LABS:                        9.7    12.58 )-----------( 148      ( 12 Jan 2023 00:28 )             31.5     01-12    134<L>  |  94<L>  |  28<H>  ----------------------------<  142<H>  3.3<L>   |  23  |  2.10<H>    Ca    9.4      12 Jan 2023 00:19  Phos  2.9     01-12  Mg     2.4     01-12    TPro  8.3  /  Alb  3.9  /  TBili  1.1  /  DBili  x   /  AST  21  /  ALT  14  /  AlkPhos  97  01-12    PT/INR - ( 11 Jan 2023 00:20 )   PT: 11.5 sec;   INR: 1.00 ratio         PTT - ( 11 Jan 2023 00:20 )  PTT:30.6 sec        MICROBIOLOGY:    RECENT CULTURES:        RESPIRATORY CULTURES:              RADIOLOGY & ADDITIONAL STUDIES:        Pager [phone]  After 5 pm/weekends or if no response :[phone] infectious diseases progress note:    Patient is a 62y old  Male who presents with a chief complaint of Acute cholecystitis, gallstone pancreatitis (12 Jan 2023 07:51)        Acute pancreatitis without infection or necrosis          ROS:  CONSTITUTIONAL:  Negative fever or chills, feels well, good appetite  EYES:  Negative  blurry vision or double vision  CARDIOVASCULAR:  Negative for chest pain or palpitations  RESPIRATORY:  Negative for cough, wheezing, or SOB   GASTROINTESTINAL:  Negative for nausea, vomiting, diarrhea, constipation, or abdominal pain  GENITOURINARY:  Negative frequency, urgency or dysuria  NEUROLOGIC:  No headache, confusion, dizziness, lightheadedness    Allergies    No Known Allergies    Intolerances        ANTIBIOTICS/RELEVANT:  antimicrobials    immunologic:  epoetin teena-epbx (RETACRIT) Injectable 5000 Unit(s) IV Push <User Schedule>    OTHER:  acetaminophen    Suspension .. 650 milliGRAM(s) Oral every 6 hours PRN  aMIOdarone    Tablet 200 milliGRAM(s) Oral daily  aspirin  chewable 81 milliGRAM(s) Oral daily  atorvastatin 80 milliGRAM(s) Oral at bedtime  benzonatate 100 milliGRAM(s) Oral every 8 hours  chlorhexidine 2% Cloths 1 Application(s) Topical <User Schedule>  chlorhexidine 4% Liquid 1 Application(s) Topical <User Schedule>  guaiFENesin Oral Liquid (Sugar-Free) 200 milliGRAM(s) Oral every 6 hours PRN  heparin   Injectable 5000 Unit(s) SubCutaneous every 8 hours  hydrocodone/homatropine Syrup 5 milliLiter(s) Oral every 8 hours PRN  insulin lispro (ADMELOG) corrective regimen sliding scale   SubCutaneous every 6 hours  insulin NPH human recombinant 7 Unit(s) SubCutaneous every 6 hours  metoprolol tartrate 12.5 milliGRAM(s) Oral every 12 hours  midodrine 20 milliGRAM(s) Oral every 8 hours  multivitamin/minerals/iron Oral Solution (CENTRUM) 15 milliLiter(s) Oral daily  pantoprazole  Injectable 40 milliGRAM(s) IV Push daily  polyethylene glycol 3350 17 Gram(s) Oral daily  senna 2 Tablet(s) Oral at bedtime  sevelamer carbonate 1600 milliGRAM(s) Oral every 8 hours  simethicone 80 milliGRAM(s) Chew daily PRN  sodium chloride 0.9% lock flush 10 milliLiter(s) IV Push every 1 hour PRN  thiamine 100 milliGRAM(s) Enteral Tube daily      Objective:  Vital Signs Last 24 Hrs  T(C): 36.6 (12 Jan 2023 04:00), Max: 36.6 (12 Jan 2023 04:00)  T(F): 97.9 (12 Jan 2023 04:00), Max: 97.9 (12 Jan 2023 04:00)  HR: 104 (12 Jan 2023 07:00) (93 - 118)  BP: 109/61 (12 Jan 2023 06:00) (109/61 - 124/54)  BP(mean): 79 (12 Jan 2023 06:00) (78 - 79)  RR: 14 (12 Jan 2023 07:00) (1 - 34)  SpO2: 97% (12 Jan 2023 07:00) (93% - 100%)    Parameters below as of 12 Jan 2023 05:00  Patient On (Oxygen Delivery Method): room air           Eyes:INOCENCIO, EOMI  Ear/Nose/Throat: no oral lesion, no sinus tenderness on percussion	  Neck:no JVD, no lymphadenopathy, supple  Respiratory: CTA lore  Cardiovascular: S1S2 RRR, no murmurs  Gastrointestinal:soft, (+) BS, no HSM  Extremities:no e/e/c        LABS:                        9.7    12.58 )-----------( 148      ( 12 Jan 2023 00:28 )             31.5     01-12    134<L>  |  94<L>  |  28<H>  ----------------------------<  142<H>  3.3<L>   |  23  |  2.10<H>    Ca    9.4      12 Jan 2023 00:19  Phos  2.9     01-12  Mg     2.4     01-12    TPro  8.3  /  Alb  3.9  /  TBili  1.1  /  DBili  x   /  AST  21  /  ALT  14  /  AlkPhos  97  01-12    PT/INR - ( 11 Jan 2023 00:20 )   PT: 11.5 sec;   INR: 1.00 ratio         PTT - ( 11 Jan 2023 00:20 )  PTT:30.6 sec        MICROBIOLOGY:    RECENT CULTURES:        RESPIRATORY CULTURES:              RADIOLOGY & ADDITIONAL STUDIES:        Pager [phone]  After 5 pm/weekends or if no response :[phone]

## 2023-01-12 NOTE — PROGRESS NOTE ADULT - SUBJECTIVE AND OBJECTIVE BOX
Interventional Radiology Follow-Up Note.     Patient seen and examined @ bedside around 7am.     This is a 62y Male s/p tunneled HD catheter placement on 1/11 in Interventional Radiology.   Reports soreness at the site.    Medication:     aMIOdarone    Tablet: (01-12)  aspirin  chewable: (01-11)  caspofungin IVPB: (01-10)  heparin   Injectable: (01-12)  metoprolol tartrate: (01-10)  midodrine: (01-12)    Vitals:   T(F): 97.9, Max: 97.9 (04:00)  HR: 104  BP: 109/61  RR: 14  SpO2: 97%    Physical Exam:  General: Nontoxic, in NAD.  Neck: right neck HD catheter site dressing c/d/i. No hematoma noted. No ttp      Aspartate Aminotransferase (AST/SGOT): 21 U/L (01-12-23 @ 00:19)  Alanine Aminotransferase (ALT/SGPT): 14 U/L (01-12-23 @ 00:19)  Aspartate Aminotransferase (AST/SGOT): 18 U/L (01-11-23 @ 00:20)  Alanine Aminotransferase (ALT/SGPT): 13 U/L (01-11-23 @ 00:20)        LABS:  Na: 134 (01-12 @ 00:19), 134 (01-11 @ 00:20), 136 (01-10 @ 00:36)  K: 3.3 (01-12 @ 00:19), 5.1 (01-11 @ 00:20), 4.3 (01-10 @ 00:36)  Cl: 94 (01-12 @ 00:19), 96 (01-11 @ 00:20), 97 (01-10 @ 00:36)  CO2: 23 (01-12 @ 00:19), 22 (01-11 @ 00:20), 26 (01-10 @ 00:36)  BUN: 28 (01-12 @ 00:19), 64 (01-11 @ 00:20), 38 (01-10 @ 00:36)  Cr: 2.10 (01-12 @ 00:19), 4.12 (01-11 @ 00:20), 2.81 (01-10 @ 00:36)  Glu: 142(01-12 @ 00:19), 120(01-11 @ 00:20), 145(01-10 @ 00:36)    Hgb: 9.7 (01-12 @ 00:28), 9.2 (01-11 @ 00:20), 9.0 (01-10 @ 00:36)  Hct: 31.5 (01-12 @ 00:28), 29.3 (01-11 @ 00:20), 28.7 (01-10 @ 00:36)  WBC: 12.58 (01-12 @ 00:28), 15.07 (01-11 @ 00:20), 11.68 (01-10 @ 00:36)  Plt: 148 (01-12 @ 00:28), 173 (01-11 @ 00:20), 134 (01-10 @ 00:36)    INR: 1.00 01-11-23 @ 00:20  PTT: 30.6 01-11-23 @ 00:20          LIVER FUNCTIONS - ( 12 Jan 2023 00:19 )  Alb: 3.9 g/dL / Pro: 8.3 g/dL / ALK PHOS: 97 U/L / ALT: 14 U/L / AST: 21 U/L / GGT: x                    Assessment/Plan:  62y Male ESRD s/p Tunneled HD catheter placement on 1/11.      - Okay to use catheter.  - IR will sign off.     Please call IR at  [phone] with any questions, concerns, or issues regarding above.    Also available on Teams.     Interventional Radiology Follow-Up Note.     Patient seen and examined @ bedside around 7am.     This is a 62y Male s/p tunneled HD catheter placement on 1/11 in Interventional Radiology.   Reports soreness at the site.    Medication:     aMIOdarone    Tablet: (01-12)  aspirin  chewable: (01-11)  caspofungin IVPB: (01-10)  heparin   Injectable: (01-12)  metoprolol tartrate: (01-10)  midodrine: (01-12)    Vitals:   T(F): 97.9, Max: 97.9 (04:00)  HR: 104  BP: 109/61  RR: 14  SpO2: 97%    Physical Exam:  General: Nontoxic, in NAD.  Neck: right neck HD catheter site dressing c/d/i. No hematoma noted. No ttp      Aspartate Aminotransferase (AST/SGOT): 21 U/L (01-12-23 @ 00:19)  Alanine Aminotransferase (ALT/SGPT): 14 U/L (01-12-23 @ 00:19)  Aspartate Aminotransferase (AST/SGOT): 18 U/L (01-11-23 @ 00:20)  Alanine Aminotransferase (ALT/SGPT): 13 U/L (01-11-23 @ 00:20)        LABS:  Na: 134 (01-12 @ 00:19), 134 (01-11 @ 00:20), 136 (01-10 @ 00:36)  K: 3.3 (01-12 @ 00:19), 5.1 (01-11 @ 00:20), 4.3 (01-10 @ 00:36)  Cl: 94 (01-12 @ 00:19), 96 (01-11 @ 00:20), 97 (01-10 @ 00:36)  CO2: 23 (01-12 @ 00:19), 22 (01-11 @ 00:20), 26 (01-10 @ 00:36)  BUN: 28 (01-12 @ 00:19), 64 (01-11 @ 00:20), 38 (01-10 @ 00:36)  Cr: 2.10 (01-12 @ 00:19), 4.12 (01-11 @ 00:20), 2.81 (01-10 @ 00:36)  Glu: 142(01-12 @ 00:19), 120(01-11 @ 00:20), 145(01-10 @ 00:36)    Hgb: 9.7 (01-12 @ 00:28), 9.2 (01-11 @ 00:20), 9.0 (01-10 @ 00:36)  Hct: 31.5 (01-12 @ 00:28), 29.3 (01-11 @ 00:20), 28.7 (01-10 @ 00:36)  WBC: 12.58 (01-12 @ 00:28), 15.07 (01-11 @ 00:20), 11.68 (01-10 @ 00:36)  Plt: 148 (01-12 @ 00:28), 173 (01-11 @ 00:20), 134 (01-10 @ 00:36)    INR: 1.00 01-11-23 @ 00:20  PTT: 30.6 01-11-23 @ 00:20          LIVER FUNCTIONS - ( 12 Jan 2023 00:19 )  Alb: 3.9 g/dL / Pro: 8.3 g/dL / ALK PHOS: 97 U/L / ALT: 14 U/L / AST: 21 U/L / GGT: x                    Assessment/Plan:  62y Male ESRD s/p Tunneled HD catheter placement on 1/11.      - Okay to use catheter.  - IR will sign off.     Please call IR at  [phone] with any questions, concerns, or issues regarding above.    Also available on Teams.     Interventional Radiology Follow-Up Note.     Patient seen and examined @ bedside around 7am.     This is a 62y Male s/p tunneled HD catheter placement on 1/11 in Interventional Radiology.   Reports soreness at the site.    Medication:     aMIOdarone    Tablet: (01-12)  aspirin  chewable: (01-11)  caspofungin IVPB: (01-10)  heparin   Injectable: (01-12)  metoprolol tartrate: (01-10)  midodrine: (01-12)    Vitals:   T(F): 97.9, Max: 97.9 (04:00)  HR: 104  BP: 109/61  RR: 14  SpO2: 97%    Physical Exam:  General: Nontoxic, in NAD.  Neck: right neck HD catheter site dressing c/d/i. No hematoma noted. No ttp      Aspartate Aminotransferase (AST/SGOT): 21 U/L (01-12-23 @ 00:19)  Alanine Aminotransferase (ALT/SGPT): 14 U/L (01-12-23 @ 00:19)  Aspartate Aminotransferase (AST/SGOT): 18 U/L (01-11-23 @ 00:20)  Alanine Aminotransferase (ALT/SGPT): 13 U/L (01-11-23 @ 00:20)        LABS:  Na: 134 (01-12 @ 00:19), 134 (01-11 @ 00:20), 136 (01-10 @ 00:36)  K: 3.3 (01-12 @ 00:19), 5.1 (01-11 @ 00:20), 4.3 (01-10 @ 00:36)  Cl: 94 (01-12 @ 00:19), 96 (01-11 @ 00:20), 97 (01-10 @ 00:36)  CO2: 23 (01-12 @ 00:19), 22 (01-11 @ 00:20), 26 (01-10 @ 00:36)  BUN: 28 (01-12 @ 00:19), 64 (01-11 @ 00:20), 38 (01-10 @ 00:36)  Cr: 2.10 (01-12 @ 00:19), 4.12 (01-11 @ 00:20), 2.81 (01-10 @ 00:36)  Glu: 142(01-12 @ 00:19), 120(01-11 @ 00:20), 145(01-10 @ 00:36)    Hgb: 9.7 (01-12 @ 00:28), 9.2 (01-11 @ 00:20), 9.0 (01-10 @ 00:36)  Hct: 31.5 (01-12 @ 00:28), 29.3 (01-11 @ 00:20), 28.7 (01-10 @ 00:36)  WBC: 12.58 (01-12 @ 00:28), 15.07 (01-11 @ 00:20), 11.68 (01-10 @ 00:36)  Plt: 148 (01-12 @ 00:28), 173 (01-11 @ 00:20), 134 (01-10 @ 00:36)    INR: 1.00 01-11-23 @ 00:20  PTT: 30.6 01-11-23 @ 00:20          LIVER FUNCTIONS - ( 12 Jan 2023 00:19 )  Alb: 3.9 g/dL / Pro: 8.3 g/dL / ALK PHOS: 97 U/L / ALT: 14 U/L / AST: 21 U/L / GGT: x                    Assessment/Plan:  62y Male ESRD s/p Tunneled HD catheter placement on 1/11.      - Okay to use catheter.  - IR will sign off.     Please call IR at  [phone] with any questions, concerns, or issues regarding above.    Also available on Teams.

## 2023-01-12 NOTE — PROGRESS NOTE ADULT - ASSESSMENT
62M CAD s/p CABG, DM, HTN, presented as a trasnfer from [facility] for STEMI, found to have pancreatitis not necrotizing and cholecystitis on CT AP, admitted to to SICU for respiratory failure due to ARDS. Moved to the CTU for ECMO for cardiogenic shock vs ARDS, ECMO decannulated on 12/8 with IABP placement and mitral clip and removed on 12/17, s/p trac on 12/16/22, now on trach collar.    #Candidemia  Unclear source  Bcx positive for candida tropicalis on 12/26/22  Repeat with no growth to date  Limited TTE shows no evidence for valvular vegetations, mitral clip in place    #ESRD on HD  Nephrology following    shiley in place      Continue caspofungin 50mg daily   The JUAN of organism is on the high side against diflucan    isolated CNS in BC is a contaminate   doing well  sp PermCath    competed frankie  wbc nl no signs of candida eye disease  reculture prn                                          62M CAD s/p CABG, DM, HTN, presented as a trasnfer from [facility] for STEMI, found to have pancreatitis not necrotizing and cholecystitis on CT AP, admitted to to SICU for respiratory failure due to ARDS. Moved to the CTU for ECMO for cardiogenic shock vs ARDS, ECMO decannulated on 12/8 with IABP placement and mitral clip and removed on 12/17, s/p trac on 12/16/22, now on trach collar.    #Candidemia  Unclear source  Bcx positive for candida tropicalis on 12/26/22  Repeat with no growth to date  Limited TTE shows no evidence for valvular vegetations, mitral clip in place    #ESRD on HD  Nephrology following    shiley in place      Continue caspofungin 50mg daily   The JUAN of organism is on the high side against diflucan    isolated CNS in BC is a contaminate   doing well  sp PermCath    competed frankie  wbc nl no signs of candida eye disease  reculture prn                                          62M CAD s/p CABG, DM, HTN, presented as a trasnfer from [place] for STEMI, found to have pancreatitis not necrotizing and cholecystitis on CT AP, admitted to to SICU for respiratory failure due to ARDS. Moved to the CTU for ECMO for cardiogenic shock vs ARDS, ECMO decannulated on 12/8 with IABP placement and mitral clip and removed on 12/17, s/p trac on 12/16/22, now on trach collar.    #Candidemia  Unclear source  Bcx positive for candida tropicalis on 12/26/22  Repeat with no growth to date  Limited TTE shows no evidence for valvular vegetations, mitral clip in place    #ESRD on HD  Nephrology following    shiley in place      Continue caspofungin 50mg daily   The JUAN of organism is on the high side against diflucan    isolated CNS in BC is a contaminate   doing well  sp PermCath    competed frankie  wbc nl no signs of candida eye disease  reculture prn

## 2023-01-12 NOTE — PROGRESS NOTE ADULT - SUBJECTIVE AND OBJECTIVE BOX
[facility] Division of Kidney Diseases & Hypertension  FOLLOW UP NOTE  [phone]--------------------------------------------------------------------------------  Chief Complaint:Acute pancreatitis without infection or necrosis      HPI: 61 y/o male with PMH of CABG, DM, HTN, HLD presenting as transfer from [place] for c/f STEMI. Course c/b gallstone pancreatitis leading to ARDS and shock & cardiac arrest now on [facility]O. Had significant troponin elevation and his LVEF down to 8%. Pt was deemed to be too unstable to have an angiogram and potential PCI due to his co-morbidities & a new subdural bleed. Pt being seen for ERIC. SCr on arrival was 2.15; trended down to hector 1.6 on 11/25 and eventually increased to 3.95 11/28. Pt received IV diuretics as per CTU. Pt initiated on CRRT on 12/5/22 to optimize volume status and electrolytes.  Pt underwent decannulation of ECMO on 12/8/22. Pt was restarted on 12/9/22 for volume overload. Pt underwent mitral clip OR (12/16/22). s/p trach on 12/16. Decannulated on 1/8. Candidemia, on caspofungin per ID. Completed Caspo. s/p RIJ tunneled HD cath on 1/11      24 hour events/subjective: Patient seen & examined. Labs & vitals reviewed.  Bp stable, remains off pressors. s/p RIJ tunneled HD cath on 1/11. Last HD session 1/11 with 2L UF. CXR with stable b/l pulm vasc congestion. Net -ve 2L . Voided 300cc in 24 hrs.               PAST HISTORY  --------------------------------------------------------------------------------  No significant changes to PMH, PSH, FHx, SHx, unless otherwise noted    ALLERGIES & MEDICATIONS  --------------------------------------------------------------------------------  Allergies    No Known Allergies    Intolerances      Standing Inpatient Medications  aMIOdarone    Tablet 200 milliGRAM(s) Oral daily  aspirin  chewable 81 milliGRAM(s) Oral daily  atorvastatin 80 milliGRAM(s) Oral at bedtime  benzonatate 100 milliGRAM(s) Oral every 8 hours  chlorhexidine 2% Cloths 1 Application(s) Topical <User Schedule>  chlorhexidine 4% Liquid 1 Application(s) Topical <User Schedule>  epoetin teena-epbx (RETACRIT) Injectable 5000 Unit(s) IV Push <User Schedule>  heparin   Injectable 5000 Unit(s) SubCutaneous every 8 hours  insulin lispro (ADMELOG) corrective regimen sliding scale   SubCutaneous every 6 hours  insulin NPH human recombinant 7 Unit(s) SubCutaneous every 6 hours  metoprolol tartrate 12.5 milliGRAM(s) Oral every 12 hours  midodrine 20 milliGRAM(s) Oral every 8 hours  multivitamin/minerals/iron Oral Solution (CENTRUM) 15 milliLiter(s) Oral daily  pantoprazole  Injectable 40 milliGRAM(s) IV Push daily  polyethylene glycol 3350 17 Gram(s) Oral daily  senna 2 Tablet(s) Oral at bedtime  sevelamer carbonate 1600 milliGRAM(s) Oral every 8 hours  thiamine 100 milliGRAM(s) Enteral Tube daily    PRN Inpatient Medications  acetaminophen    Suspension .. 650 milliGRAM(s) Oral every 6 hours PRN  guaiFENesin Oral Liquid (Sugar-Free) 200 milliGRAM(s) Oral every 6 hours PRN  hydrocodone/homatropine Syrup 5 milliLiter(s) Oral every 8 hours PRN  simethicone 80 milliGRAM(s) Chew daily PRN  sodium chloride 0.9% lock flush 10 milliLiter(s) IV Push every 1 hour PRN      REVIEW OF SYSTEMS  --------------------------------------------------------------------------------  Gen: No chills  Respiratory: No dyspnea, cough  CV: No chest pain  GI: No abdominal pain, diarrhea,  nausea, vomiting  : No dysuria, hematuria  MSK:  no edema  Neuro: No dizziness/lightheadedness      All other systems were reviewed and are negative, except as noted.    VITALS/PHYSICAL EXAM  --------------------------------------------------------------------------------  T(C): 36.3 (01-12-23 @ 08:00), Max: 36.6 (01-12-23 @ 04:00)  HR: 107 (01-12-23 @ 09:00) (93 - 118)  BP: 99/58 (01-12-23 @ 09:00) (99/58 - 124/54)  RR: 11 (01-12-23 @ 09:00) (1 - 34)  SpO2: 99% (01-12-23 @ 09:00) (93% - 100%)  Wt(kg): --  Height (cm): 172.7 (01-11-23 @ 17:58)  Weight (kg): 92.9 (01-11-23 @ 17:58)  BMI (kg/m2): 31.1 (01-11-23 @ 17:58)  BSA (m2): 2.06 (01-11-23 @ 17:58)      01-11-23 @ 07:01  -  01-12-23 @ 07:00  --------------------------------------------------------  IN: 280 mL / OUT: 2300 mL / NET: -2020 mL      Physical Exam:  	Gen: NAD  	HEENT: Anicteric  	Pulm: CTA b/l   	CV: S1S2+  	Abd: Soft, +BS      	Ext: no LE edema B/L  	Neuro: Awake  	Skin: Warm and dry  	Vascular access: RIJ tunneled HD cath       LABS/STUDIES  --------------------------------------------------------------------------------              9.7    12.58 >-----------<  148      [01-12-23 @ 00:28]              31.5     134  |  94  |  28  ----------------------------<  142      [01-12-23 @ 00:19]  3.3   |  23  |  2.10        Ca     9.4     [01-12-23 @ 00:19]      Mg     2.4     [01-12-23 @ 00:19]      Phos  2.9     [01-12-23 @ 00:19]    TPro  8.3  /  Alb  3.9  /  TBili  1.1  /  DBili  x   /  AST  21  /  ALT  14  /  AlkPhos  97  [01-12-23 @ 00:19]    PT/INR: PT 11.5 , INR 1.00       [01-11-23 @ 00:20]  PTT: 30.6       [01-11-23 @ 00:20]      Creatinine Trend:  SCr 2.10 [01-12 @ 00:19]  SCr 4.12 [01-11 @ 00:20]  SCr 2.81 [01-10 @ 00:36]  SCr 3.64 [01-09 @ 00:35]  SCr 1.95 [01-08 @ 00:21]             [facility] Division of Kidney Diseases & Hypertension  FOLLOW UP NOTE  [phone]--------------------------------------------------------------------------------  Chief Complaint:Acute pancreatitis without infection or necrosis      HPI: 63 y/o male with PMH of CABG, DM, HTN, HLD presenting as transfer from [place] for c/f STEMI. Course c/b gallstone pancreatitis leading to ARDS and shock & cardiac arrest now on [facility] ECM[facility]. Had significant troponin elevation and his LVEF down to 8%. Pt was deemed to be too unstable to have an angiogram and potential PCI due to his co-morbidities & a new subdural bleed. Pt being seen for ERIC. SCr on arrival was 2.15; trended down to hector 1.6 on 11/25 and eventually increased to 3.95 11/28. Pt received IV diuretics as per CTU. Pt initiated on CRRT on 12/5/22 to optimize volume status and electrolytes.  Pt underwent decannulation of ECMO on 12/8/22. Pt was restarted on 12/9/22 for volume overload. Pt underwent mitral clip OR (12/16/22). s/p trach on 12/16. Decannulated on 1/8. Candidemia, on caspofungin per ID. Completed Caspo. s/p RIJ tunneled HD cath on 1/11      24 hour events/subjective: Patient seen & examined. Labs & vitals reviewed.  Bp stable, remains off pressors. s/p RIJ tunneled HD cath on 1/11. Last HD session 1/11 with 2L UF. CXR with stable b/l pulm vasc congestion. Net -ve 2L . Voided 300cc in 24 hrs.               PAST HISTORY  --------------------------------------------------------------------------------  No significant changes to PMH, PSH, FHx, SHx, unless otherwise noted    ALLERGIES & MEDICATIONS  --------------------------------------------------------------------------------  Allergies    No Known Allergies    Intolerances      Standing Inpatient Medications  aMIOdarone    Tablet 200 milliGRAM(s) Oral daily  aspirin  chewable 81 milliGRAM(s) Oral daily  atorvastatin 80 milliGRAM(s) Oral at bedtime  benzonatate 100 milliGRAM(s) Oral every 8 hours  chlorhexidine 2% Cloths 1 Application(s) Topical <User Schedule>  chlorhexidine 4% Liquid 1 Application(s) Topical <User Schedule>  epoetin teena-epbx (RETACRIT) Injectable 5000 Unit(s) IV Push <User Schedule>  heparin   Injectable 5000 Unit(s) SubCutaneous every 8 hours  insulin lispro (ADMELOG) corrective regimen sliding scale   SubCutaneous every 6 hours  insulin NPH human recombinant 7 Unit(s) SubCutaneous every 6 hours  metoprolol tartrate 12.5 milliGRAM(s) Oral every 12 hours  midodrine 20 milliGRAM(s) Oral every 8 hours  multivitamin/minerals/iron Oral Solution (CENTRUM) 15 milliLiter(s) Oral daily  pantoprazole  Injectable 40 milliGRAM(s) IV Push daily  polyethylene glycol 3350 17 Gram(s) Oral daily  senna 2 Tablet(s) Oral at bedtime  sevelamer carbonate 1600 milliGRAM(s) Oral every 8 hours  thiamine 100 milliGRAM(s) Enteral Tube daily    PRN Inpatient Medications  acetaminophen    Suspension .. 650 milliGRAM(s) Oral every 6 hours PRN  guaiFENesin Oral Liquid (Sugar-Free) 200 milliGRAM(s) Oral every 6 hours PRN  hydrocodone/homatropine Syrup 5 milliLiter(s) Oral every 8 hours PRN  simethicone 80 milliGRAM(s) Chew daily PRN  sodium chloride 0.9% lock flush 10 milliLiter(s) IV Push every 1 hour PRN      REVIEW OF SYSTEMS  --------------------------------------------------------------------------------  Gen: No chills  Respiratory: No dyspnea, cough  CV: No chest pain  GI: No abdominal pain, diarrhea,  nausea, vomiting  : No dysuria, hematuria  MSK:  no edema  Neuro: No dizziness/lightheadedness      All other systems were reviewed and are negative, except as noted.    VITALS/PHYSICAL EXAM  --------------------------------------------------------------------------------  T(C): 36.3 (01-12-23 @ 08:00), Max: 36.6 (01-12-23 @ 04:00)  HR: 107 (01-12-23 @ 09:00) (93 - 118)  BP: 99/58 (01-12-23 @ 09:00) (99/58 - 124/54)  RR: 11 (01-12-23 @ 09:00) (1 - 34)  SpO2: 99% (01-12-23 @ 09:00) (93% - 100%)  Wt(kg): --  Height (cm): 172.7 (01-11-23 @ 17:58)  Weight (kg): 92.9 (01-11-23 @ 17:58)  BMI (kg/m2): 31.1 (01-11-23 @ 17:58)  BSA (m2): 2.06 (01-11-23 @ 17:58)      01-11-23 @ 07:01  -  01-12-23 @ 07:00  --------------------------------------------------------  IN: 280 mL / OUT: 2300 mL / NET: -2020 mL      Physical Exam:  	Gen: NAD  	HEENT: Anicteric  	Pulm: CTA b/l   	CV: S1S2+  	Abd: Soft, +BS      	Ext: no LE edema B/L  	Neuro: Awake  	Skin: Warm and dry  	Vascular access: RIJ tunneled HD cath       LABS/STUDIES  --------------------------------------------------------------------------------              9.7    12.58 >-----------<  148      [01-12-23 @ 00:28]              31.5     134  |  94  |  28  ----------------------------<  142      [01-12-23 @ 00:19]  3.3   |  23  |  2.10        Ca     9.4     [01-12-23 @ 00:19]      Mg     2.4     [01-12-23 @ 00:19]      Phos  2.9     [01-12-23 @ 00:19]    TPro  8.3  /  Alb  3.9  /  TBili  1.1  /  DBili  x   /  AST  21  /  ALT  14  /  AlkPhos  97  [01-12-23 @ 00:19]    PT/INR: PT 11.5 , INR 1.00       [01-11-23 @ 00:20]  PTT: 30.6       [01-11-23 @ 00:20]      Creatinine Trend:  SCr 2.10 [01-12 @ 00:19]  SCr 4.12 [01-11 @ 00:20]  SCr 2.81 [01-10 @ 00:36]  SCr 3.64 [01-09 @ 00:35]  SCr 1.95 [01-08 @ 00:21]             [facility] Division of Kidney Diseases & Hypertension  FOLLOW UP NOTE  [phone]--------------------------------------------------------------------------------  Chief Complaint:Acute pancreatitis without infection or necrosis      HPI: 61 y/o male with PMH of CABG, DM, HTN, HLD presenting as transfer from [place] for c/f STEMI. Course c/b gallstone pancreatitis leading to ARDS and shock & cardiac arrest now on [facility] ECMO. Had significant troponin elevation and his LVEF down to 8%. Pt was deemed to be too unstable to have an angiogram and potential PCI due to his co-morbidities & a new subdural bleed. Pt being seen for ERIC. SCr on arrival was 2.15; trended down to hector 1.6 on 11/25 and eventually increased to 3.95 11/28. Pt received IV diuretics as per CTU. Pt initiated on CRRT on 12/5/22 to optimize volume status and electrolytes.  Pt underwent decannulation of ECMO on 12/8/22. Pt was restarted on 12/9/22 for volume overload. Pt underwent mitral clip OR (12/16/22). s/p trach on 12/16. Decannulated on 1/8. Candidemia, on caspofungin per ID. Completed Caspo. s/p RIJ tunneled HD cath on 1/11      24 hour events/subjective: Patient seen & examined. Labs & vitals reviewed.  Bp stable, remains off pressors. s/p RIJ tunneled HD cath on 1/11. Last HD session 1/11 with 2L UF. CXR with stable b/l pulm vasc congestion. Net -ve 2L . Voided 300cc in 24 hrs.               PAST HISTORY  --------------------------------------------------------------------------------  No significant changes to PMH, PSH, FHx, SHx, unless otherwise noted    ALLERGIES & MEDICATIONS  --------------------------------------------------------------------------------  Allergies    No Known Allergies    Intolerances      Standing Inpatient Medications  aMIOdarone    Tablet 200 milliGRAM(s) Oral daily  aspirin  chewable 81 milliGRAM(s) Oral daily  atorvastatin 80 milliGRAM(s) Oral at bedtime  benzonatate 100 milliGRAM(s) Oral every 8 hours  chlorhexidine 2% Cloths 1 Application(s) Topical <User Schedule>  chlorhexidine 4% Liquid 1 Application(s) Topical <User Schedule>  epoetin teena-epbx (RETACRIT) Injectable 5000 Unit(s) IV Push <User Schedule>  heparin   Injectable 5000 Unit(s) SubCutaneous every 8 hours  insulin lispro (ADMELOG) corrective regimen sliding scale   SubCutaneous every 6 hours  insulin NPH human recombinant 7 Unit(s) SubCutaneous every 6 hours  metoprolol tartrate 12.5 milliGRAM(s) Oral every 12 hours  midodrine 20 milliGRAM(s) Oral every 8 hours  multivitamin/minerals/iron Oral Solution (CENTRUM) 15 milliLiter(s) Oral daily  pantoprazole  Injectable 40 milliGRAM(s) IV Push daily  polyethylene glycol 3350 17 Gram(s) Oral daily  senna 2 Tablet(s) Oral at bedtime  sevelamer carbonate 1600 milliGRAM(s) Oral every 8 hours  thiamine 100 milliGRAM(s) Enteral Tube daily    PRN Inpatient Medications  acetaminophen    Suspension .. 650 milliGRAM(s) Oral every 6 hours PRN  guaiFENesin Oral Liquid (Sugar-Free) 200 milliGRAM(s) Oral every 6 hours PRN  hydrocodone/homatropine Syrup 5 milliLiter(s) Oral every 8 hours PRN  simethicone 80 milliGRAM(s) Chew daily PRN  sodium chloride 0.9% lock flush 10 milliLiter(s) IV Push every 1 hour PRN      REVIEW OF SYSTEMS  --------------------------------------------------------------------------------  Gen: No chills  Respiratory: No dyspnea, cough  CV: No chest pain  GI: No abdominal pain, diarrhea,  nausea, vomiting  : No dysuria, hematuria  MSK:  no edema  Neuro: No dizziness/lightheadedness      All other systems were reviewed and are negative, except as noted.    VITALS/PHYSICAL EXAM  --------------------------------------------------------------------------------  T(C): 36.3 (01-12-23 @ 08:00), Max: 36.6 (01-12-23 @ 04:00)  HR: 107 (01-12-23 @ 09:00) (93 - 118)  BP: 99/58 (01-12-23 @ 09:00) (99/58 - 124/54)  RR: 11 (01-12-23 @ 09:00) (1 - 34)  SpO2: 99% (01-12-23 @ 09:00) (93% - 100%)  Wt(kg): --  Height (cm): 172.7 (01-11-23 @ 17:58)  Weight (kg): 92.9 (01-11-23 @ 17:58)  BMI (kg/m2): 31.1 (01-11-23 @ 17:58)  BSA (m2): 2.06 (01-11-23 @ 17:58)      01-11-23 @ 07:01  -  01-12-23 @ 07:00  --------------------------------------------------------  IN: 280 mL / OUT: 2300 mL / NET: -2020 mL      Physical Exam:  	Gen: NAD  	HEENT: Anicteric  	Pulm: CTA b/l   	CV: S1S2+  	Abd: Soft, +BS      	Ext: no LE edema B/L  	Neuro: Awake  	Skin: Warm and dry  	Vascular access: RIJ tunneled HD cath       LABS/STUDIES  --------------------------------------------------------------------------------              9.7    12.58 >-----------<  148      [01-12-23 @ 00:28]              31.5     134  |  94  |  28  ----------------------------<  142      [01-12-23 @ 00:19]  3.3   |  23  |  2.10        Ca     9.4     [01-12-23 @ 00:19]      Mg     2.4     [01-12-23 @ 00:19]      Phos  2.9     [01-12-23 @ 00:19]    TPro  8.3  /  Alb  3.9  /  TBili  1.1  /  DBili  x   /  AST  21  /  ALT  14  /  AlkPhos  97  [01-12-23 @ 00:19]    PT/INR: PT 11.5 , INR 1.00       [01-11-23 @ 00:20]  PTT: 30.6       [01-11-23 @ 00:20]      Creatinine Trend:  SCr 2.10 [01-12 @ 00:19]  SCr 4.12 [01-11 @ 00:20]  SCr 2.81 [01-10 @ 00:36]  SCr 3.64 [01-09 @ 00:35]  SCr 1.95 [01-08 @ 00:21]

## 2023-01-12 NOTE — PROGRESS NOTE ADULT - SUBJECTIVE AND OBJECTIVE BOX
CRITICAL CARE ATTENDING - [facility]ICU    MEDICATIONS  (STANDING):  aMIOdarone    Tablet 200 milliGRAM(s) Oral daily  aspirin  chewable 81 milliGRAM(s) Oral daily  atorvastatin 80 milliGRAM(s) Oral at bedtime  chlorhexidine 2% Cloths 1 Application(s) Topical <User Schedule>  chlorhexidine 4% Liquid 1 Application(s) Topical <User Schedule>  epoetin teena-epbx (RETACRIT) Injectable 5000 Unit(s) IV Push <User Schedule>  heparin   Injectable 5000 Unit(s) SubCutaneous every 8 hours  insulin lispro (ADMELOG) corrective regimen sliding scale   SubCutaneous every 6 hours  insulin NPH human recombinant 7 Unit(s) SubCutaneous every 6 hours  metoprolol tartrate 12.5 milliGRAM(s) Oral every 12 hours  midodrine 20 milliGRAM(s) Oral every 8 hours  multivitamin/minerals/iron Oral Solution (CENTRUM) 15 milliLiter(s) Oral daily  pantoprazole  Injectable 40 milliGRAM(s) IV Push daily  polyethylene glycol 3350 17 Gram(s) Oral daily  senna 2 Tablet(s) Oral at bedtime  sevelamer carbonate 1600 milliGRAM(s) Oral every 8 hours  thiamine 100 milliGRAM(s) Enteral Tube daily                                    9.7    12.58 )-----------( 148      ( 2023 00:28 )             31.5           134<L>  |  94<L>  |  28<H>  ----------------------------<  142<H>  3.3<L>   |  23  |  2.10<H>    Ca    9.4      2023 00:19  Phos  2.9       Mg     2.4         TPro  8.3  /  Alb  3.9  /  TBili  1.1  /  DBili  x   /  AST  21  /  ALT  14  /  AlkPhos  97  12      PT/INR - ( 2023 00:20 )   PT: 11.5 sec;   INR: 1.00 ratio         PTT - ( 2023 00:20 )  PTT:30.6 sec        Daily Height in cm: 172.72 (2023 17:58)    Daily Weight in k.7 (2023 00:00)      01-10 @ 07: @ 07:00  --------------------------------------------------------  IN: 710 mL / OUT: 125 mL / NET: 585 mL     @ 07: @ 05:57  --------------------------------------------------------  IN: 100 mL / OUT: 2300 mL / NET: -2200 mL        Critically Ill patient  : [ ] preoperative ,   [x ] post operative    Requires :  [ x] Arterial Line   [ ] Central Line  [ ] PA catheter  [ ] IABP  [ ] ECMO  [ ] LVAD  [ ] Ventilator  [ ] pacemaker [ ] Impella.                      [ x] ABG's     [x ] Pulse Oxymetry Monitoring  Bedside evaluation , monitoring , treatment of hemodynamics , fluids , IVP/ IVCD meds.        Diagnosis:     Cardiogenic Shock / STEMI    Pancreatitis     s/p VA ECMO / IABP - Decannulated     Respiratory Failure  - resolved    Mitral Clip     Tracheostomy - decannulated      Renal Failure - Acute Kidney Injury     Hyponatremia    Thrombocytopenia    [ x] Hemodialysis - last night [ ] Hemofiltration:    [ x] negative fluid balance [ ] even fluid balance, in a hemodynamically unstable patient.     CHF- acute [ x]   chronic [ ]    systolic [x ]   diastolic [ ]  Valvular [ ]          - Echo- EF -   30%          [ ] RV dysfunction          - Cxr-cardiomegally, edema          - Clinical-  [ ]inotropes   [x]pressors - PO midodrine / Donny weaned off   [ ]diuresis   [ ]IABP   [ ]ECMO   [ ]LVAD   [ ]Respiratory Failure    Hypotension     Hemodynamic lability,  instability. Requires  [x ] vasopressors- Donny weaned to off [ ] inotropes  [ ] vasodilator  [ ]IVSS fluid  to maintain MAP, perfusion, C.I.     Tolerates NG / NJ feeds weaned off --> CC diet    Requires bedside physical therapy, mobilization and total halfway care.     candida in Blood x 1        By signing my name below, I, Maria D'Amico, attest that this documentation has been prepared under the direction and in the presence of Finn Zelaya MD.   Electronically Signed: Maria D'Amico, Scribe 23 @ 05:57      Discussed with CT surgeon, Physician Assistant - Nurse Practitioner- Critical care medicine team.   Discussed at  AM / PM rounds.   Chart, labs , films reviewed.    Cumulative Critical Care Time Given Today:  CRITICAL CARE ATTENDING - [facility]    MEDICATIONS  (STANDING):  aMIOdarone    Tablet 200 milliGRAM(s) Oral daily  aspirin  chewable 81 milliGRAM(s) Oral daily  atorvastatin 80 milliGRAM(s) Oral at bedtime  chlorhexidine 2% Cloths 1 Application(s) Topical <User Schedule>  chlorhexidine 4% Liquid 1 Application(s) Topical <User Schedule>  epoetin teena-epbx (RETACRIT) Injectable 5000 Unit(s) IV Push <User Schedule>  heparin   Injectable 5000 Unit(s) SubCutaneous every 8 hours  insulin lispro (ADMELOG) corrective regimen sliding scale   SubCutaneous every 6 hours  insulin NPH human recombinant 7 Unit(s) SubCutaneous every 6 hours  metoprolol tartrate 12.5 milliGRAM(s) Oral every 12 hours  midodrine 20 milliGRAM(s) Oral every 8 hours  multivitamin/minerals/iron Oral Solution (CENTRUM) 15 milliLiter(s) Oral daily  pantoprazole  Injectable 40 milliGRAM(s) IV Push daily  polyethylene glycol 3350 17 Gram(s) Oral daily  senna 2 Tablet(s) Oral at bedtime  sevelamer carbonate 1600 milliGRAM(s) Oral every 8 hours  thiamine 100 milliGRAM(s) Enteral Tube daily                                    9.7    12.58 )-----------( 148      ( 2023 00:28 )             31.5           134<L>  |  94<L>  |  28<H>  ----------------------------<  142<H>  3.3<L>   |  23  |  2.10<H>    Ca    9.4      2023 00:19  Phos  2.9       Mg     2.4         TPro  8.3  /  Alb  3.9  /  TBili  1.1  /  DBili  x   /  AST  21  /  ALT  14  /  AlkPhos  97  12      PT/INR - ( 2023 00:20 )   PT: 11.5 sec;   INR: 1.00 ratio         PTT - ( 2023 00:20 )  PTT:30.6 sec        Daily Height in cm: 172.72 (2023 17:58)    Daily Weight in k.7 (2023 00:00)      01-10 @ 07: @ 07:00  --------------------------------------------------------  IN: 710 mL / OUT: 125 mL / NET: 585 mL     @ 07: @ 05:57  --------------------------------------------------------  IN: 100 mL / OUT: 2300 mL / NET: -2200 mL        Critically Ill patient  : [ ] preoperative ,   [x ] post operative    Requires :  [ x] Arterial Line   [ ] Central Line  [ ] PA catheter  [ ] IABP  [ ] ECMO  [ ] LVAD  [ ] Ventilator  [ ] pacemaker [ ] Impella.                      [ x] ABG's     [x ] Pulse Oxymetry Monitoring  Bedside evaluation , monitoring , treatment of hemodynamics , fluids , IVP/ IVCD meds.        Diagnosis:     Cardiogenic Shock / STEMI    Pancreatitis     s/p VA ECMO / IABP - Decannulated     Respiratory Failure  - resolved    Mitral Clip     Tracheostomy - decannulated      Renal Failure - Acute Kidney Injury     Hyponatremia    Thrombocytopenia    [ x] Hemodialysis - last night [ ] Hemofiltration:    [ x] negative fluid balance [ ] even fluid balance, in a hemodynamically unstable patient.     CHF- acute [ x]   chronic [ ]    systolic [x ]   diastolic [ ]  Valvular [ ]          - Echo- EF -   30%          [ ] RV dysfunction          - Cxr-cardiomegally, edema          - Clinical-  [ ]inotropes   [x]pressors - PO midodrine / Donny weaned off   [ ]diuresis   [ ]IABP   [ ]ECMO   [ ]LVAD   [ ]Respiratory Failure    Hypotension     Hemodynamic lability,  instability. Requires  [x ] vasopressors- Donny weaned to off [ ] inotropes  [ ] vasodilator  [ ]IVSS fluid  to maintain MAP, perfusion, C.I.     Tolerates NG / NJ feeds weaned off --> CC diet    Requires bedside physical therapy, mobilization and total care home care.     candida in Blood x 1        By signing my name below, I, Maria D'Amico, attest that this documentation has been prepared under the direction and in the presence of Finn Zelaya MD.   Electronically Signed: Maria D'Amico, Scribe 23 @ 05:57      Discussed with CT surgeon, Physician Assistant - Nurse Practitioner- Critical care medicine team.   Discussed at  AM / PM rounds.   Chart, labs , films reviewed.    Cumulative Critical Care Time Given Today:  CRITICAL CARE ATTENDING - [facility]    MEDICATIONS  (STANDING):  aMIOdarone    Tablet 200 milliGRAM(s) Oral daily  aspirin  chewable 81 milliGRAM(s) Oral daily  atorvastatin 80 milliGRAM(s) Oral at bedtime  chlorhexidine 2% Cloths 1 Application(s) Topical <User Schedule>  chlorhexidine 4% Liquid 1 Application(s) Topical <User Schedule>  epoetin teena-epbx (RETACRIT) Injectable 5000 Unit(s) IV Push <User Schedule>  heparin   Injectable 5000 Unit(s) SubCutaneous every 8 hours  insulin lispro (ADMELOG) corrective regimen sliding scale   SubCutaneous every 6 hours  insulin NPH human recombinant 7 Unit(s) SubCutaneous every 6 hours  metoprolol tartrate 12.5 milliGRAM(s) Oral every 12 hours  midodrine 20 milliGRAM(s) Oral every 8 hours  multivitamin/minerals/iron Oral Solution (CENTRUM) 15 milliLiter(s) Oral daily  pantoprazole  Injectable 40 milliGRAM(s) IV Push daily  polyethylene glycol 3350 17 Gram(s) Oral daily  senna 2 Tablet(s) Oral at bedtime  sevelamer carbonate 1600 milliGRAM(s) Oral every 8 hours  thiamine 100 milliGRAM(s) Enteral Tube daily                                    9.7    12.58 )-----------( 148      ( 2023 00:28 )             31.5           134<L>  |  94<L>  |  28<H>  ----------------------------<  142<H>  3.3<L>   |  23  |  2.10<H>    Ca    9.4      2023 00:19  Phos  2.9       Mg     2.4         TPro  8.3  /  Alb  3.9  /  TBili  1.1  /  DBili  x   /  AST  21  /  ALT  14  /  AlkPhos  97  12      PT/INR - ( 2023 00:20 )   PT: 11.5 sec;   INR: 1.00 ratio         PTT - ( 2023 00:20 )  PTT:30.6 sec        Daily Height in cm: 172.72 (2023 17:58)    Daily Weight in k.7 (2023 00:00)      01-10 @ 07: @ 07:00  --------------------------------------------------------  IN: 710 mL / OUT: 125 mL / NET: 585 mL     @ 07: @ 05:57  --------------------------------------------------------  IN: 100 mL / OUT: 2300 mL / NET: -2200 mL        Critically Ill patient  : [ ] preoperative ,   [x ] post operative    Requires :  [ x] Arterial Line   [ ] Central Line  [ ] PA catheter  [ ] IABP  [ ] ECMO  [ ] LVAD  [ ] Ventilator  [ ] pacemaker [ ] Impella.                      [ x] ABG's     [x ] Pulse Oxymetry Monitoring  Bedside evaluation , monitoring , treatment of hemodynamics , fluids , IVP/ IVCD meds.        Diagnosis:     Cardiogenic Shock / STEMI    Pancreatitis     s/p VA ECMO / IABP - Decannulated     Respiratory Failure  - resolved    Mitral Clip     Tracheostomy - decannulated      Renal Failure - Acute Kidney Injury     Hyponatremia    Thrombocytopenia    [ x] Hemodialysis - last night [ ] Hemofiltration:    [ x] negative fluid balance [ ] even fluid balance, in a hemodynamically unstable patient.     CHF- acute [ x]   chronic [ ]    systolic [x ]   diastolic [ ]  Valvular [ ]          - Echo- EF -   30%          [ ] RV dysfunction          - Cxr-cardiomegally, edema          - Clinical-  [ ]inotropes   [x]pressors - PO midodrine / Donny weaned off   [ ]diuresis   [ ]IABP   [ ]ECMO   [ ]LVAD   [ ]Respiratory Failure    Hypotension     Hemodynamic lability,  instability. Requires  [x ] vasopressors- Donny weaned to off [ ] inotropes  [ ] vasodilator  [ ]IVSS fluid  to maintain MAP, perfusion, C.I.     Tolerates NG / NJ feeds weaned off --> CC diet    Requires bedside physical therapy, mobilization and total senior care care.     candida in Blood x 1        By signing my name below, I, Maria D'Amico, attest that this documentation has been prepared under the direction and in the presence of Finn Zelaya MD.   Electronically Signed: Maria D'Amico, Scribe 23 @ 05:57      Discussed with CT surgeon, Physician Assistant - Nurse Practitioner- Critical care medicine team.   Discussed at  AM / PM rounds.   Chart, labs , films reviewed.    Cumulative Critical Care Time Given Today:  CRITICAL CARE ATTENDING - [facility]    MEDICATIONS  (STANDING):  aMIOdarone    Tablet 200 milliGRAM(s) Oral daily  aspirin  chewable 81 milliGRAM(s) Oral daily  atorvastatin 80 milliGRAM(s) Oral at bedtime  chlorhexidine 2% Cloths 1 Application(s) Topical <User Schedule>  chlorhexidine 4% Liquid 1 Application(s) Topical <User Schedule>  epoetin teena-epbx (RETACRIT) Injectable 5000 Unit(s) IV Push <User Schedule>  heparin   Injectable 5000 Unit(s) SubCutaneous every 8 hours  insulin lispro (ADMELOG) corrective regimen sliding scale   SubCutaneous every 6 hours  insulin NPH human recombinant 7 Unit(s) SubCutaneous every 6 hours  metoprolol tartrate 12.5 milliGRAM(s) Oral every 12 hours  midodrine 20 milliGRAM(s) Oral every 8 hours  multivitamin/minerals/iron Oral Solution (CENTRUM) 15 milliLiter(s) Oral daily  pantoprazole  Injectable 40 milliGRAM(s) IV Push daily  polyethylene glycol 3350 17 Gram(s) Oral daily  senna 2 Tablet(s) Oral at bedtime  sevelamer carbonate 1600 milliGRAM(s) Oral every 8 hours  thiamine 100 milliGRAM(s) Enteral Tube daily                                    9.7    12.58 )-----------( 148      ( 2023 00:28 )             31.5           134<L>  |  94<L>  |  28<H>  ----------------------------<  142<H>  3.3<L>   |  23  |  2.10<H>    Ca    9.4      2023 00:19  Phos  2.9       Mg     2.4         TPro  8.3  /  Alb  3.9  /  TBili  1.1  /  DBili  x   /  AST  21  /  ALT  14  /  AlkPhos  97  12      PT/INR - ( 2023 00:20 )   PT: 11.5 sec;   INR: 1.00 ratio         PTT - ( 2023 00:20 )  PTT:30.6 sec        Daily Height in cm: 172.72 (2023 17:58)    Daily Weight in k.7 (2023 00:00)      01-10 @ 07: @ 07:00  --------------------------------------------------------  IN: 710 mL / OUT: 125 mL / NET: 585 mL     @ 07: @ 05:57  --------------------------------------------------------  IN: 100 mL / OUT: 2300 mL / NET: -2200 mL        Critically Ill patient  : [ ] preoperative ,   [x ] post operative    Requires :  [ x] Arterial Line   [ ] Central Line  [ ] PA catheter  [ ] IABP  [ ] ECMO  [ ] LVAD  [ ] Ventilator  [ ] pacemaker [ ] Impella.                      [ x] ABG's     [x ] Pulse Oxymetry Monitoring  Bedside evaluation , monitoring , treatment of hemodynamics , fluids , IVP/ IVCD meds.        Diagnosis:     Cardiogenic Shock / STEMI    Pancreatitis     s/p VA ECMO / IABP - Decannulated     Respiratory Failure  - resolved    Mitral Clip     Tracheostomy - decannulated      Renal Failure - Acute Kidney Injury     Hyponatremia    Hypokalemia    Fluid  overload     Thrombocytopenia    [ x] Hemodialysis - last night [ ] Hemofiltration:    [ x] negative fluid balance [ ] even fluid balance, in a hemodynamically unstable patient.     CHF- acute [ x]   chronic [ ]    systolic [x ]   diastolic [ ]  Valvular [ ]          - Echo- EF -   30%          [ ] RV dysfunction          - Cxr-cardiomegally, edema          - Clinical-  [ ]inotropes   [x]pressors - PO midodrine / Donny weaned off   [ ]diuresis   [ ]IABP   [ ]ECMO   [ ]LVAD   [ ]Respiratory Failure    Hypotension     Hemodynamic lability,  instability. Requires  [x ] vasopressors- Donny weaned to off [ ] inotropes  [ ] vasodilator  [ ]IVSS fluid  to maintain MAP, perfusion, C.I.     Tolerates NG / NJ feeds weaned off --> CC diet    Requires bedside physical therapy, mobilization and total alf care.     candida in Blood x 1        By signing my name below, I, Maria D'Amico, attest that this documentation has been prepared under the direction and in the presence of Finn Zelaya MD.   Electronically Signed: Maria D'Amico, Scribe 23 @ 05:57    I, Finn Zelaya, personally performed the services described in this documentation. All medical record entries made by the scribe were at my direction and in my presence. I have reviewed the chart and agree that the record reflects my personal performance and is accurate and complete.   Finn Zelaya MD.       Discussed with CT surgeon, Physician Assistant - Nurse Practitioner- Critical care medicine team.   Discussed at  AM / PM rounds.   Chart, labs , films reviewed.    Cumulative Critical Care Time Given Today:  30 min CRITICAL CARE ATTENDING - [facility]ICU    MEDICATIONS  (STANDING):  aMIOdarone    Tablet 200 milliGRAM(s) Oral daily  aspirin  chewable 81 milliGRAM(s) Oral daily  atorvastatin 80 milliGRAM(s) Oral at bedtime  chlorhexidine 2% Cloths 1 Application(s) Topical <User Schedule>  chlorhexidine 4% Liquid 1 Application(s) Topical <User Schedule>  epoetin teena-epbx (RETACRIT) Injectable 5000 Unit(s) IV Push <User Schedule>  heparin   Injectable 5000 Unit(s) SubCutaneous every 8 hours  insulin lispro (ADMELOG) corrective regimen sliding scale   SubCutaneous every 6 hours  insulin NPH human recombinant 7 Unit(s) SubCutaneous every 6 hours  metoprolol tartrate 12.5 milliGRAM(s) Oral every 12 hours  midodrine 20 milliGRAM(s) Oral every 8 hours  multivitamin/minerals/iron Oral Solution (CENTRUM) 15 milliLiter(s) Oral daily  pantoprazole  Injectable 40 milliGRAM(s) IV Push daily  polyethylene glycol 3350 17 Gram(s) Oral daily  senna 2 Tablet(s) Oral at bedtime  sevelamer carbonate 1600 milliGRAM(s) Oral every 8 hours  thiamine 100 milliGRAM(s) Enteral Tube daily                                    9.7    12.58 )-----------( 148      ( 2023 00:28 )             31.5           134<L>  |  94<L>  |  28<H>  ----------------------------<  142<H>  3.3<L>   |  23  |  2.10<H>    Ca    9.4      2023 00:19  Phos  2.9       Mg     2.4         TPro  8.3  /  Alb  3.9  /  TBili  1.1  /  DBili  x   /  AST  21  /  ALT  14  /  AlkPhos  97  12      PT/INR - ( 2023 00:20 )   PT: 11.5 sec;   INR: 1.00 ratio         PTT - ( 2023 00:20 )  PTT:30.6 sec        Daily Height in cm: 172.72 (2023 17:58)    Daily Weight in k.7 (2023 00:00)      01-10 @ 07: @ 07:00  --------------------------------------------------------  IN: 710 mL / OUT: 125 mL / NET: 585 mL     @ 07: @ 05:57  --------------------------------------------------------  IN: 100 mL / OUT: 2300 mL / NET: -2200 mL        Critically Ill patient  : [ ] preoperative ,   [x ] post operative    Requires :  [ x] Arterial Line   [ ] Central Line  [ ] PA catheter  [ ] IABP  [ ] ECMO  [ ] LVAD  [ ] Ventilator  [ ] pacemaker [ ] Impella.                      [ x] ABG's     [x ] Pulse Oxymetry Monitoring  Bedside evaluation , monitoring , treatment of hemodynamics , fluids , IVP/ IVCD meds.        Diagnosis:     Cardiogenic Shock / STEMI    Pancreatitis     s/p VA ECMO / IABP - Decannulated     Respiratory Failure  - resolved    Mitral Clip     Tracheostomy - decannulated      Renal Failure - Acute Kidney Injury     Hyponatremia    Hypokalemia    Fluid  overload     Thrombocytopenia    [ x] Hemodialysis - last night [ ] Hemofiltration:    [ x] negative fluid balance [ ] even fluid balance, in a hemodynamically unstable patient.     CHF- acute [ x]   chronic [ ]    systolic [x ]   diastolic [ ]  Valvular [ ]          - Echo- EF -   30%          [ ] RV dysfunction          - Cxr-cardiomegally, edema          - Clinical-  [ ]inotropes   [x]pressors - PO midodrine / Donny weaned off   [ ]diuresis   [ ]IABP   [ ]ECMO   [ ]LVAD   [ ]Respiratory Failure    Hypotension     Hemodynamic lability,  instability. Requires  [x ] vasopressors- Donny weaned to off [ ] inotropes  [ ] vasodilator  [ ]IVSS fluid  to maintain MAP, perfusion, C.I.     Tolerates NG / NJ feeds weaned off --> CC diet    Requires bedside physical therapy, mobilization and total [facility] care.     candida in Blood x 1        By signing my name below, I, Maria D'Amico, attest that this documentation has been prepared under the direction and in the presence of Finn Zelaya MD.   Electronically Signed: Maria D'Amico, Scribe 23 @ 05:57    I, Finn Zelaya, personally performed the services described in this documentation. All medical record entries made by the scribe were at my direction and in my presence. I have reviewed the chart and agree that the record reflects my personal performance and is accurate and complete.   Finn Zelaya MD.       Discussed with CT surgeon, Physician Assistant - Nurse Practitioner- Critical care medicine team.   Discussed at  AM / PM rounds.   Chart, labs , films reviewed.    Cumulative Critical Care Time Given Today:  30 min CRITICAL CARE ATTENDING - CTICU    MEDICATIONS  (STANDING):  aMIOdarone    Tablet 200 milliGRAM(s) Oral daily  aspirin  chewable 81 milliGRAM(s) Oral daily  atorvastatin 80 milliGRAM(s) Oral at bedtime  chlorhexidine 2% Cloths 1 Application(s) Topical <User Schedule>  chlorhexidine 4% Liquid 1 Application(s) Topical <User Schedule>  epoetin teena-epbx (RETACRIT) Injectable 5000 Unit(s) IV Push <User Schedule>  heparin   Injectable 5000 Unit(s) SubCutaneous every 8 hours  insulin lispro (ADMELOG) corrective regimen sliding scale   SubCutaneous every 6 hours  insulin NPH human recombinant 7 Unit(s) SubCutaneous every 6 hours  metoprolol tartrate 12.5 milliGRAM(s) Oral every 12 hours  midodrine 20 milliGRAM(s) Oral every 8 hours  multivitamin/minerals/iron Oral Solution (CENTRUM) 15 milliLiter(s) Oral daily  pantoprazole  Injectable 40 milliGRAM(s) IV Push daily  polyethylene glycol 3350 17 Gram(s) Oral daily  senna 2 Tablet(s) Oral at bedtime  sevelamer carbonate 1600 milliGRAM(s) Oral every 8 hours  thiamine 100 milliGRAM(s) Enteral Tube daily                                    9.7    12.58 )-----------( 148      ( 2023 00:28 )             31.5           134<L>  |  94<L>  |  28<H>  ----------------------------<  142<H>  3.3<L>   |  23  |  2.10<H>    Ca    9.4      2023 00:19  Phos  2.9       Mg     2.4         TPro  8.3  /  Alb  3.9  /  TBili  1.1  /  DBili  x   /  AST  21  /  ALT  14  /  AlkPhos  97  12      PT/INR - ( 2023 00:20 )   PT: 11.5 sec;   INR: 1.00 ratio         PTT - ( 2023 00:20 )  PTT:30.6 sec        Daily Height in cm: 172.72 (2023 17:58)    Daily Weight in k.7 (2023 00:00)      01-10 @ 07: @ 07:00  --------------------------------------------------------  IN: 710 mL / OUT: 125 mL / NET: 585 mL     @ 07: @ 05:57  --------------------------------------------------------  IN: 100 mL / OUT: 2300 mL / NET: -2200 mL        Critically Ill patient  : [ ] preoperative ,   [x ] post operative    Requires :  [ x] Arterial Line   [ ] Central Line  [ ] PA catheter  [ ] IABP  [ ] ECMO  [ ] LVAD  [ ] Ventilator  [ ] pacemaker [ ] Impella.                      [ x] ABG's     [x ] Pulse Oxymetry Monitoring  Bedside evaluation , monitoring , treatment of hemodynamics , fluids , IVP/ IVCD meds.        Diagnosis:     Cardiogenic Shock / STEMI    Pancreatitis     s/p VA ECMO / IABP - Decannulated     Respiratory Failure  - resolved    Mitral Clip     Tracheostomy - decannulated      Renal Failure - Acute Kidney Injury     Hyponatremia    Hypokalemia    Fluid  overload     Thrombocytopenia    [ x] Hemodialysis - last night [ ] Hemofiltration:    [ x] negative fluid balance [ ] even fluid balance, in a hemodynamically unstable patient.     CHF- acute [ x]   chronic [ ]    systolic [x ]   diastolic [ ]  Valvular [ ]          - Echo- EF -   30%          [ ] RV dysfunction          - Cxr-cardiomegally, edema          - Clinical-  [ ]inotropes   [x]pressors - PO midodrine / Donny weaned off   [ ]diuresis   [ ]IABP   [ ]ECMO   [ ]LVAD   [ ]Respiratory Failure    Hypotension     Hemodynamic lability,  instability. Requires  [x ] vasopressors- Donny weaned to off [ ] inotropes  [ ] vasodilator  [ ]IVSS fluid  to maintain MAP, perfusion, C.I.     Tolerates NG / NJ feeds weaned off --> CC diet    Requires bedside physical therapy, mobilization and total assisted care.     candida in Blood x 1        By signing my name below, I, Maria D'Amico, attest that this documentation has been prepared under the direction and in the presence of Finn Zelaya MD.   Electronically Signed: Maria D'Amico, Scribe 23 @ 05:57    I, Finn Zelaya, personally performed the services described in this documentation. All medical record entries made by the scribe were at my direction and in my presence. I have reviewed the chart and agree that the record reflects my personal performance and is accurate and complete.   Finn Zelaya MD.       Discussed with CT surgeon, Physician Assistant - Nurse Practitioner- Critical care medicine team.   Discussed at  AM / PM rounds.   Chart, labs , films reviewed.    Cumulative Critical Care Time Given Today:  30 min

## 2023-01-13 LAB
ALBUMIN SERPL ELPH-MCNC: 3.6 G/DL — SIGNIFICANT CHANGE UP (ref 3.3–5)
ALP SERPL-CCNC: 88 U/L — SIGNIFICANT CHANGE UP (ref 40–120)
ALT FLD-CCNC: 12 U/L — SIGNIFICANT CHANGE UP (ref 10–45)
ANION GAP SERPL CALC-SCNC: 15 MMOL/L — SIGNIFICANT CHANGE UP (ref 5–17)
AST SERPL-CCNC: 19 U/L — SIGNIFICANT CHANGE UP (ref 10–40)
BILIRUB SERPL-MCNC: 0.7 MG/DL — SIGNIFICANT CHANGE UP (ref 0.2–1.2)
BUN SERPL-MCNC: 52 MG/DL — HIGH (ref 7–23)
CALCIUM SERPL-MCNC: 9.2 MG/DL — SIGNIFICANT CHANGE UP (ref 8.4–10.5)
CHLORIDE SERPL-SCNC: 94 MMOL/L — LOW (ref 96–108)
CO2 SERPL-SCNC: 23 MMOL/L — SIGNIFICANT CHANGE UP (ref 22–31)
CREAT SERPL-MCNC: 4.21 MG/DL — HIGH (ref 0.5–1.3)
EGFR: 15 ML/MIN/1.73M2 — LOW
GLUCOSE BLDC GLUCOMTR-MCNC: 109 MG/DL — HIGH (ref 70–99)
GLUCOSE BLDC GLUCOMTR-MCNC: 141 MG/DL — HIGH (ref 70–99)
GLUCOSE BLDC GLUCOMTR-MCNC: 143 MG/DL — HIGH (ref 70–99)
GLUCOSE BLDC GLUCOMTR-MCNC: 152 MG/DL — HIGH (ref 70–99)
GLUCOSE BLDC GLUCOMTR-MCNC: 87 MG/DL — SIGNIFICANT CHANGE UP (ref 70–99)
GLUCOSE BLDC GLUCOMTR-MCNC: 95 MG/DL — SIGNIFICANT CHANGE UP (ref 70–99)
GLUCOSE SERPL-MCNC: 145 MG/DL — HIGH (ref 70–99)
HCT VFR BLD CALC: 30.4 % — LOW (ref 39–50)
HGB BLD-MCNC: 9.4 G/DL — LOW (ref 13–17)
MAGNESIUM SERPL-MCNC: 2.8 MG/DL — HIGH (ref 1.6–2.6)
MCHC RBC-ENTMCNC: 30.1 PG — SIGNIFICANT CHANGE UP (ref 27–34)
MCHC RBC-ENTMCNC: 30.9 GM/DL — LOW (ref 32–36)
MCV RBC AUTO: 97.4 FL — SIGNIFICANT CHANGE UP (ref 80–100)
NRBC # BLD: 0 /100 WBCS — SIGNIFICANT CHANGE UP (ref 0–0)
PHOSPHATE SERPL-MCNC: 5.3 MG/DL — HIGH (ref 2.5–4.5)
PLATELET # BLD AUTO: 196 K/UL — SIGNIFICANT CHANGE UP (ref 150–400)
POTASSIUM SERPL-MCNC: 4.4 MMOL/L — SIGNIFICANT CHANGE UP (ref 3.5–5.3)
POTASSIUM SERPL-SCNC: 4.4 MMOL/L — SIGNIFICANT CHANGE UP (ref 3.5–5.3)
PROT SERPL-MCNC: 7.9 G/DL — SIGNIFICANT CHANGE UP (ref 6–8.3)
RBC # BLD: 3.12 M/UL — LOW (ref 4.2–5.8)
RBC # FLD: 19.2 % — HIGH (ref 10.3–14.5)
SODIUM SERPL-SCNC: 132 MMOL/L — LOW (ref 135–145)
WBC # BLD: 12.59 K/UL — HIGH (ref 3.8–10.5)
WBC # FLD AUTO: 12.59 K/UL — HIGH (ref 3.8–10.5)

## 2023-01-13 PROCEDURE — 99291 CRITICAL CARE FIRST HOUR: CPT

## 2023-01-13 PROCEDURE — 71045 X-RAY EXAM CHEST 1 VIEW: CPT | Mod: 26

## 2023-01-13 PROCEDURE — 99233 SBSQ HOSP IP/OBS HIGH 50: CPT | Mod: GC

## 2023-01-13 PROCEDURE — 99232 SBSQ HOSP IP/OBS MODERATE 35: CPT

## 2023-01-13 RX ORDER — ONDANSETRON 8 MG/1
4 TABLET, FILM COATED ORAL ONCE
Refills: 0 | Status: COMPLETED | OUTPATIENT
Start: 2023-01-13 | End: 2023-01-13

## 2023-01-13 RX ADMIN — SENNA PLUS 2 TABLET(S): 8.6 TABLET ORAL at 21:22

## 2023-01-13 RX ADMIN — HUMAN INSULIN 7 UNIT(S): 100 INJECTION, SUSPENSION SUBCUTANEOUS at 00:32

## 2023-01-13 RX ADMIN — HUMAN INSULIN 7 UNIT(S): 100 INJECTION, SUSPENSION SUBCUTANEOUS at 05:50

## 2023-01-13 RX ADMIN — HEPARIN SODIUM 5000 UNIT(S): 5000 INJECTION INTRAVENOUS; SUBCUTANEOUS at 18:10

## 2023-01-13 RX ADMIN — Medication 81 MILLIGRAM(S): at 11:23

## 2023-01-13 RX ADMIN — Medication 100 MILLIGRAM(S): at 11:24

## 2023-01-13 RX ADMIN — Medication 25 MILLIGRAM(S): at 05:50

## 2023-01-13 RX ADMIN — POLYETHYLENE GLYCOL 3350 17 GRAM(S): 17 POWDER, FOR SOLUTION ORAL at 11:24

## 2023-01-13 RX ADMIN — SEVELAMER CARBONATE 1600 MILLIGRAM(S): 2400 POWDER, FOR SUSPENSION ORAL at 21:22

## 2023-01-13 RX ADMIN — SEVELAMER CARBONATE 1600 MILLIGRAM(S): 2400 POWDER, FOR SUSPENSION ORAL at 16:47

## 2023-01-13 RX ADMIN — Medication 100 MILLIGRAM(S): at 05:51

## 2023-01-13 RX ADMIN — ERYTHROPOIETIN 5000 UNIT(S): 10000 INJECTION, SOLUTION INTRAVENOUS; SUBCUTANEOUS at 13:37

## 2023-01-13 RX ADMIN — MIDODRINE HYDROCHLORIDE 20 MILLIGRAM(S): 2.5 TABLET ORAL at 13:33

## 2023-01-13 RX ADMIN — Medication 15 MILLILITER(S): at 11:23

## 2023-01-13 RX ADMIN — Medication 100 MILLIGRAM(S): at 21:21

## 2023-01-13 RX ADMIN — SEVELAMER CARBONATE 1600 MILLIGRAM(S): 2400 POWDER, FOR SUSPENSION ORAL at 05:50

## 2023-01-13 RX ADMIN — HUMAN INSULIN 7 UNIT(S): 100 INJECTION, SUSPENSION SUBCUTANEOUS at 12:33

## 2023-01-13 RX ADMIN — AMIODARONE HYDROCHLORIDE 200 MILLIGRAM(S): 400 TABLET ORAL at 05:50

## 2023-01-13 RX ADMIN — MIDODRINE HYDROCHLORIDE 20 MILLIGRAM(S): 2.5 TABLET ORAL at 05:50

## 2023-01-13 RX ADMIN — CHLORHEXIDINE GLUCONATE 1 APPLICATION(S): 213 SOLUTION TOPICAL at 06:19

## 2023-01-13 RX ADMIN — ATORVASTATIN CALCIUM 80 MILLIGRAM(S): 80 TABLET, FILM COATED ORAL at 21:23

## 2023-01-13 RX ADMIN — HEPARIN SODIUM 5000 UNIT(S): 5000 INJECTION INTRAVENOUS; SUBCUTANEOUS at 09:58

## 2023-01-13 RX ADMIN — Medication 100 MILLIGRAM(S): at 16:47

## 2023-01-13 RX ADMIN — MIDODRINE HYDROCHLORIDE 20 MILLIGRAM(S): 2.5 TABLET ORAL at 21:22

## 2023-01-13 RX ADMIN — HEPARIN SODIUM 5000 UNIT(S): 5000 INJECTION INTRAVENOUS; SUBCUTANEOUS at 00:33

## 2023-01-13 RX ADMIN — ONDANSETRON 4 MILLIGRAM(S): 8 TABLET, FILM COATED ORAL at 11:23

## 2023-01-13 RX ADMIN — PANTOPRAZOLE SODIUM 40 MILLIGRAM(S): 20 TABLET, DELAYED RELEASE ORAL at 11:23

## 2023-01-13 NOTE — PROGRESS NOTE ADULT - SUBJECTIVE AND OBJECTIVE BOX
[facility] Division of Kidney Diseases & Hypertension  FOLLOW UP NOTE  [phone]--------------------------------------------------------------------------------  Chief Complaint:Acute pancreatitis without infection or necrosis        HPI: 63 y/o male with PMH of CABG, DM, HTN, HLD presenting as transfer from [place] for c/f STEMI. Course c/b gallstone pancreatitis leading to ARDS and shock & cardiac arrest requiring [facility] ECMO. Had significant troponin elevation and his LVEF down to 8%. Pt was deemed to be too unstable to have an angiogram and potential PCI due to his co-morbidities & a new subdural bleed. Pt was seen for ERIC. Pt initiated on CRRT on 12/5/22 to optimize volume status and electrolytes. Pt underwent mitral clip OR (12/16/22). s/p trach on 12/16. Decannulated on 1/8. Candidemia, on caspofungin per ID. Completed Caspo. Transitioned to iHD on 12/20. Remains HD dependant. s/p RIJ tunneled HD cath on 1/11      24 hour events/subjective: Patient seen & examined. Labs & vitals reviewed.  Bp stable, remains off pressors. Last HD session 1/11 with 2L UF. CXR with stable b/l pulm vasc congestion. Net +ve 500cc. Voided 200cc in 24 hrs.           PAST HISTORY  --------------------------------------------------------------------------------  No significant changes to PMH, PSH, FHx, SHx, unless otherwise noted    ALLERGIES & MEDICATIONS  --------------------------------------------------------------------------------  Allergies    No Known Allergies    Intolerances      Standing Inpatient Medications  aMIOdarone    Tablet 200 milliGRAM(s) Oral daily  aspirin  chewable 81 milliGRAM(s) Oral daily  atorvastatin 80 milliGRAM(s) Oral at bedtime  benzonatate 100 milliGRAM(s) Oral every 8 hours  chlorhexidine 2% Cloths 1 Application(s) Topical <User Schedule>  chlorhexidine 4% Liquid 1 Application(s) Topical <User Schedule>  epoetin teena-epbx (RETACRIT) Injectable 5000 Unit(s) IV Push <User Schedule>  heparin   Injectable 5000 Unit(s) SubCutaneous every 8 hours  insulin lispro (ADMELOG) corrective regimen sliding scale   SubCutaneous every 6 hours  insulin NPH human recombinant 7 Unit(s) SubCutaneous every 6 hours  metoprolol tartrate 25 milliGRAM(s) Oral two times a day  midodrine 20 milliGRAM(s) Oral every 8 hours  multivitamin/minerals/iron Oral Solution (CENTRUM) 15 milliLiter(s) Oral daily  pantoprazole  Injectable 40 milliGRAM(s) IV Push daily  polyethylene glycol 3350 17 Gram(s) Oral daily  senna 2 Tablet(s) Oral at bedtime  sevelamer carbonate 1600 milliGRAM(s) Oral every 8 hours  thiamine 100 milliGRAM(s) Enteral Tube daily    PRN Inpatient Medications  acetaminophen    Suspension .. 650 milliGRAM(s) Oral every 6 hours PRN  guaiFENesin Oral Liquid (Sugar-Free) 200 milliGRAM(s) Oral every 6 hours PRN  hydrocodone/homatropine Syrup 5 milliLiter(s) Oral every 8 hours PRN  simethicone 80 milliGRAM(s) Chew daily PRN  sodium chloride 0.9% lock flush 10 milliLiter(s) IV Push every 1 hour PRN      REVIEW OF SYSTEMS  --------------------------------------------------------------------------------  Gen: No chills  Respiratory: No dyspnea, cough  CV: No chest pain  GI: No abdominal pain, diarrhea,  nausea, vomiting  : No dysuria, hematuria  MSK:  no edema  Neuro: No dizziness/lightheadedness      All other systems were reviewed and are negative, except as noted.    VITALS/PHYSICAL EXAM  --------------------------------------------------------------------------------  T(C): 36.1 (01-13-23 @ 08:00), Max: 36.5 (01-12-23 @ 16:00)  HR: 80 (01-13-23 @ 09:00) (80 - 116)  BP: 94/53 (01-13-23 @ 09:00) (91/59 - 125/74)  RR: 10 (01-13-23 @ 09:00) (10 - 29)  SpO2: 100% (01-13-23 @ 09:00) (97% - 100%)  Wt(kg): --  Height (cm): 172.7 (01-11-23 @ 17:58)  Weight (kg): 92.9 (01-11-23 @ 17:58)  BMI (kg/m2): 31.1 (01-11-23 @ 17:58)  BSA (m2): 2.06 (01-11-23 @ 17:58)      01-12-23 @ 07:01 - 01-13-23 @ 07:00  --------------------------------------------------------  IN: 700 mL / OUT: 275 mL / NET: 425 mL    01-13-23 @ 07:01  -  01-13-23 @ 09:43  --------------------------------------------------------  IN: 0 mL / OUT: 50 mL / NET: -50 mL      Physical Exam:  	Gen: NAD  	HEENT: Anicteric  	Pulm: CTA b/l   	CV: S1S2+  	Abd: Soft, +BS      	Ext: no LE edema B/L  	Neuro: Awake  	Skin: Warm and dry  	Vascular access: RIJ tunneled HD cath       LABS/STUDIES  --------------------------------------------------------------------------------              9.4    12.59 >-----------<  196      [01-13-23 @ 00:49]              30.4     132  |  94  |  52  ----------------------------<  145      [01-13-23 @ 00:49]  4.4   |  23  |  4.21        Ca     9.2     [01-13-23 @ 00:49]      Mg     2.8     [01-13-23 @ 00:49]      Phos  5.3     [01-13-23 @ 00:49]    TPro  7.9  /  Alb  3.6  /  TBili  0.7  /  DBili  x   /  AST  19  /  ALT  12  /  AlkPhos  88  [01-13-23 @ 00:49]          Creatinine Trend:  SCr 4.21 [01-13 @ 00:49]  SCr 2.10 [01-12 @ 00:19]  SCr 4.12 [01-11 @ 00:20]  SCr 2.81 [01-10 @ 00:36]  SCr 3.64 [01-09 @ 00:35]             [facility] Division of Kidney Diseases & Hypertension  FOLLOW UP NOTE  [phone]--------------------------------------------------------------------------------  Chief Complaint:Acute pancreatitis without infection or necrosis        HPI: 61 y/o male with PMH of CABG, DM, HTN, HLD presenting as transfer from [place] for c/f STEMI. Course c/b gallstone pancreatitis leading to ARDS and shock & cardiac arrest requiring [facility] ECMO. Had significant troponin elevation and his LVEF down to 8%. Pt was deemed to be too unstable to have an angiogram and potential PCI due to his co-morbidities & a new subdural bleed. Pt was seen for ERIC. Pt initiated on CRRT on 12/5/22 to optimize volume status and electrolytes. Pt underwent mitral clip OR (12/16/22). s/p trach on 12/16. Decannulated on 1/8. Candidemia, on caspofungin per ID. Completed Caspo. Transitioned to iHD on 12/20. Remains HD dependant. s/p RIJ tunneled HD cath on 1/11      24 hour events/subjective: Patient seen & examined. Labs & vitals reviewed.  Bp stable, remains off pressors. Last HD session 1/11 with 2L UF. CXR with stable b/l pulm vasc congestion. Net +ve 500cc. Voided 200cc in 24 hrs.           PAST HISTORY  --------------------------------------------------------------------------------  No significant changes to PMH, PSH, FHx, SHx, unless otherwise noted    ALLERGIES & MEDICATIONS  --------------------------------------------------------------------------------  Allergies    No Known Allergies    Intolerances      Standing Inpatient Medications  aMIOdarone    Tablet 200 milliGRAM(s) Oral daily  aspirin  chewable 81 milliGRAM(s) Oral daily  atorvastatin 80 milliGRAM(s) Oral at bedtime  benzonatate 100 milliGRAM(s) Oral every 8 hours  chlorhexidine 2% Cloths 1 Application(s) Topical <User Schedule>  chlorhexidine 4% Liquid 1 Application(s) Topical <User Schedule>  epoetin teena-epbx (RETACRIT) Injectable 5000 Unit(s) IV Push <User Schedule>  heparin   Injectable 5000 Unit(s) SubCutaneous every 8 hours  insulin lispro (ADMELOG) corrective regimen sliding scale   SubCutaneous every 6 hours  insulin NPH human recombinant 7 Unit(s) SubCutaneous every 6 hours  metoprolol tartrate 25 milliGRAM(s) Oral two times a day  midodrine 20 milliGRAM(s) Oral every 8 hours  multivitamin/minerals/iron Oral Solution (CENTRUM) 15 milliLiter(s) Oral daily  pantoprazole  Injectable 40 milliGRAM(s) IV Push daily  polyethylene glycol 3350 17 Gram(s) Oral daily  senna 2 Tablet(s) Oral at bedtime  sevelamer carbonate 1600 milliGRAM(s) Oral every 8 hours  thiamine 100 milliGRAM(s) Enteral Tube daily    PRN Inpatient Medications  acetaminophen    Suspension .. 650 milliGRAM(s) Oral every 6 hours PRN  guaiFENesin Oral Liquid (Sugar-Free) 200 milliGRAM(s) Oral every 6 hours PRN  hydrocodone/homatropine Syrup 5 milliLiter(s) Oral every 8 hours PRN  simethicone 80 milliGRAM(s) Chew daily PRN  sodium chloride 0.9% lock flush 10 milliLiter(s) IV Push every 1 hour PRN      REVIEW OF SYSTEMS  --------------------------------------------------------------------------------  Gen: No chills  Respiratory: No dyspnea, cough  CV: No chest pain  GI: No abdominal pain, diarrhea,  nausea, vomiting  : No dysuria, hematuria  MSK:  no edema  Neuro: No dizziness/lightheadedness      All other systems were reviewed and are negative, except as noted.    VITALS/PHYSICAL EXAM  --------------------------------------------------------------------------------  T(C): 36.1 (01-13-23 @ 08:00), Max: 36.5 (01-12-23 @ 16:00)  HR: 80 (01-13-23 @ 09:00) (80 - 116)  BP: 94/53 (01-13-23 @ 09:00) (91/59 - 125/74)  RR: 10 (01-13-23 @ 09:00) (10 - 29)  SpO2: 100% (01-13-23 @ 09:00) (97% - 100%)  Wt(kg): --  Height (cm): 172.7 (01-11-23 @ 17:58)  Weight (kg): 92.9 (01-11-23 @ 17:58)  BMI (kg/m2): 31.1 (01-11-23 @ 17:58)  BSA (m2): 2.06 (01-11-23 @ 17:58)      01-12-23 @ 07:01 - 01-13-23 @ 07:00  --------------------------------------------------------  IN: 700 mL / OUT: 275 mL / NET: 425 mL    01-13-23 @ 07:01  -  01-13-23 @ 09:43  --------------------------------------------------------  IN: 0 mL / OUT: 50 mL / NET: -50 mL      Physical Exam:  	Gen: NAD  	HEENT: Anicteric  	Pulm: CTA b/l   	CV: S1S2+  	Abd: Soft, +BS      	Ext: no LE edema B/L  	Neuro: Awake  	Skin: Warm and dry  	Vascular access: RIJ tunneled HD cath       LABS/STUDIES  --------------------------------------------------------------------------------              9.4    12.59 >-----------<  196      [01-13-23 @ 00:49]              30.4     132  |  94  |  52  ----------------------------<  145      [01-13-23 @ 00:49]  4.4   |  23  |  4.21        Ca     9.2     [01-13-23 @ 00:49]      Mg     2.8     [01-13-23 @ 00:49]      Phos  5.3     [01-13-23 @ 00:49]    TPro  7.9  /  Alb  3.6  /  TBili  0.7  /  DBili  x   /  AST  19  /  ALT  12  /  AlkPhos  88  [01-13-23 @ 00:49]          Creatinine Trend:  SCr 4.21 [01-13 @ 00:49]  SCr 2.10 [01-12 @ 00:19]  SCr 4.12 [01-11 @ 00:20]  SCr 2.81 [01-10 @ 00:36]  SCr 3.64 [01-09 @ 00:35]             [facility] Division of Kidney Diseases & Hypertension  FOLLOW UP NOTE  [phone]--------------------------------------------------------------------------------  Chief Complaint:Acute pancreatitis without infection or necrosis        HPI: 61 y/o male with PMH of CABG, DM, HTN, HLD presenting as transfer from [place] for c/f STEMI. Course c/b gallstone pancreatitis leading to ARDS and shock & cardiac arrest requiring [facility] ECMO. Had significant troponin elevation and his LVEF down to 8%. Pt was deemed to be too unstable to have an angiogram and potential PCI due to his co-morbidities & a new subdural bleed. Pt was seen for ERIC. Pt initiated on CRRT on 12/5/22 to optimize volume status and electrolytes. Pt underwent mitral clip OR (12/16/22). s/p trach on 12/16. Decannulated on 1/8. Candidemia, on caspofungin per ID. Completed Caspo. Transitioned to iHD on 12/20. Remains HD dependant. s/p RIJ tunneled HD cath on 1/11      24 hour events/subjective: Patient seen & examined. Labs & vitals reviewed.  Bp stable, remains off pressors. Last HD session 1/11 with 2L UF. CXR with stable b/l pulm vasc congestion. Net +ve 500cc. Voided 200cc in 24 hrs.           PAST HISTORY  --------------------------------------------------------------------------------  No significant changes to PMH, PSH, FHx, SHx, unless otherwise noted    ALLERGIES & MEDICATIONS  --------------------------------------------------------------------------------  Allergies    No Known Allergies    Intolerances      Standing Inpatient Medications  aMIOdarone    Tablet 200 milliGRAM(s) Oral daily  aspirin  chewable 81 milliGRAM(s) Oral daily  atorvastatin 80 milliGRAM(s) Oral at bedtime  benzonatate 100 milliGRAM(s) Oral every 8 hours  chlorhexidine 2% Cloths 1 Application(s) Topical <User Schedule>  chlorhexidine 4% Liquid 1 Application(s) Topical <User Schedule>  epoetin teena-epbx (RETACRIT) Injectable 5000 Unit(s) IV Push <User Schedule>  heparin   Injectable 5000 Unit(s) SubCutaneous every 8 hours  insulin lispro (ADMELOG) corrective regimen sliding scale   SubCutaneous every 6 hours  insulin NPH human recombinant 7 Unit(s) SubCutaneous every 6 hours  metoprolol tartrate 25 milliGRAM(s) Oral two times a day  midodrine 20 milliGRAM(s) Oral every 8 hours  multivitamin/minerals/iron Oral Solution (CENTRUM) 15 milliLiter(s) Oral daily  pantoprazole  Injectable 40 milliGRAM(s) IV Push daily  polyethylene glycol 3350 17 Gram(s) Oral daily  senna 2 Tablet(s) Oral at bedtime  sevelamer carbonate 1600 milliGRAM(s) Oral every 8 hours  thiamine 100 milliGRAM(s) Enteral Tube daily    PRN Inpatient Medications  acetaminophen    Suspension .. 650 milliGRAM(s) Oral every 6 hours PRN  guaiFENesin Oral Liquid (Sugar-Free) 200 milliGRAM(s) Oral every 6 hours PRN  hydrocodone/homatropine Syrup 5 milliLiter(s) Oral every 8 hours PRN  simethicone 80 milliGRAM(s) Chew daily PRN  sodium chloride 0.9% lock flush 10 milliLiter(s) IV Push every 1 hour PRN      REVIEW OF SYSTEMS  --------------------------------------------------------------------------------  Gen: No chills  Respiratory: No dyspnea, cough  CV: No chest pain  GI: No abdominal pain, diarrhea,  nausea, vomiting  : No dysuria, hematuria  MSK:  no edema  Neuro: No dizziness/lightheadedness      All other systems were reviewed and are negative, except as noted.    VITALS/PHYSICAL EXAM  --------------------------------------------------------------------------------  T(C): 36.1 (01-13-23 @ 08:00), Max: 36.5 (01-12-23 @ 16:00)  HR: 80 (01-13-23 @ 09:00) (80 - 116)  BP: 94/53 (01-13-23 @ 09:00) (91/59 - 125/74)  RR: 10 (01-13-23 @ 09:00) (10 - 29)  SpO2: 100% (01-13-23 @ 09:00) (97% - 100%)  Wt(kg): --  Height (cm): 172.7 (01-11-23 @ 17:58)  Weight (kg): 92.9 (01-11-23 @ 17:58)  BMI (kg/m2): 31.1 (01-11-23 @ 17:58)  BSA (m2): 2.06 (01-11-23 @ 17:58)      01-12-23 @ 07:01 - 01-13-23 @ 07:00  --------------------------------------------------------  IN: 700 mL / OUT: 275 mL / NET: 425 mL    01-13-23 @ 07:01  -  01-13-23 @ 09:43  --------------------------------------------------------  IN: 0 mL / OUT: 50 mL / NET: -50 mL      Physical Exam:  	Gen: NAD  	HEENT: Anicteric  	Pulm: CTA b/l   	CV: S1S2+  	Abd: Soft, +BS      	Ext: no LE edema B/L  	Neuro: Awake  	Skin: Warm and dry  	Vascular access: RIJ tunneled HD cath       LABS/STUDIES  --------------------------------------------------------------------------------              9.4    12.59 >-----------<  196      [01-13-23 @ 00:49]              30.4     132  |  94  |  52  ----------------------------<  145      [01-13-23 @ 00:49]  4.4   |  23  |  4.21        Ca     9.2     [01-13-23 @ 00:49]      Mg     2.8     [01-13-23 @ 00:49]      Phos  5.3     [01-13-23 @ 00:49]    TPro  7.9  /  Alb  3.6  /  TBili  0.7  /  DBili  x   /  AST  19  /  ALT  12  /  AlkPhos  88  [01-13-23 @ 00:49]          Creatinine Trend:  SCr 4.21 [01-13 @ 00:49]  SCr 2.10 [01-12 @ 00:19]  SCr 4.12 [01-11 @ 00:20]  SCr 2.81 [01-10 @ 00:36]  SCr 3.64 [01-09 @ 00:35]

## 2023-01-13 NOTE — PROGRESS NOTE ADULT - ATTENDING COMMENTS
#eric  ERIC/ ATN in the setting of STEMI, cardiac arrest & cardiogenic shock  last hd Wednesday  plan hd today  oliguric, no sign of renal recovery thus far  xray +congestion  stable  #VO/pul edema-stable   #mild hyperK-stable k today;     #hypotension-pressors per ct icu  #anemia - on MIGUEL, monitor hb trend  #hyperphos-cont renvela; check phos levels    d/w CT ICU

## 2023-01-13 NOTE — PROGRESS NOTE ADULT - PROBLEM SELECTOR PLAN 3
- troponin peaked at > 54932   - [facility] 12/06 with IABP placement revealed that 3 grafts are closed w/ distal native disease from remaining LAD graft  - continue statin and aspirin.  - eventual beta blocker, however will defer for now as he is still on midodrine - troponin peaked at > 38525   - [facility] 12/06 with IABP placement revealed that 3 grafts are closed w/ distal native disease from remaining LAD graft  - continue statin and aspirin.  - eventual beta blocker, however will defer for now as he is still on midodrine - troponin peaked at > 08715   - [facility] 12/06 with IABP placement revealed that 3 grafts are closed w/ distal native disease from remaining LAD graft  - continue statin and aspirin.  - eventual beta blocker, however will defer for now as he is still on midodrine

## 2023-01-13 NOTE — PROGRESS NOTE ADULT - PROBLEM SELECTOR PLAN 1
ischemic with most recent TTE 1/9 showing EF 25%  Unable to tolerate GDMT due to borderline BPs requiring midodrine, although now with improvement in BP  Attemp to wean midodrine 20mg TID   c/w iHD  Strict I&Os

## 2023-01-13 NOTE — PROGRESS NOTE ADULT - NS ATTEND AMEND GEN_ALL_CORE FT
Patient was seen and examined at bedside with the advanced care provider.    Continues to do well.  Appears euvolemic on exam. Now on iHD. BP appears to be starting to come up, please use midodrine only preHD and limit its use as this will allow us to attempt adding GDMT in the future. Plan to move to step down unit

## 2023-01-13 NOTE — PROGRESS NOTE ADULT - PROBLEM SELECTOR PLAN 1
Pt with non oliguric ERIC/ ATN in the setting of STEMI, cardiac arrest & cardiogenic shock  SCr on arrival was 2.15; trended down to hector 1.6 on 11/25; slowly trended up & increased to 3.95 11/28. Pt initiated on CRRT/CVVHDF to optimize volume and electrolytes on 12/5/22. Pt likely with ATN. Pt underwent decannulation of ECMO on 12/8/22 and was taken off CRRT.     Pt reinitiated on CRRT overnight on 12/9/22 for volume overload. s/p trach on 12/16. CRRT stopped again 12/19 & initiated on iHD. Decannulated on 1/8, now on RA. Bp stable, remains off pressors.     Completed Caspo for fungemia. s/p RIJ tunneled HD cath on 1/11. Bp stable, remains off pressors. Last HD session 1/11 with 2L UF. CXR with stable b/l pulm vasc congestion. Net +ve 500 mL . Voided 200cc in 24 hrs. SBP in 's. Will do HD today with 1.5L UF as tolerated. Continue to monitor for renal recovery. Monitor labs and urine output. Avoid any potential nephrotoxins. Dose medications as per HD.  Phos 5.3. Continue sevelamer to 1600 tid with meals.

## 2023-01-13 NOTE — PROGRESS NOTE ADULT - PROBLEM SELECTOR PLAN 5
- likely arrest associated ATN, hypovolemia; nonoliguric   - was on CVVH now on iHD   - appreciate nephrology recommendations   - avoid nephrotoxins  - s/p CARMELO permacath 1/11

## 2023-01-13 NOTE — PROGRESS NOTE ADULT - PROBLEM SELECTOR PLAN 3
- troponin peaked at > 19358   - [facility] 12/06 with IABP placement revealed that 3 grafts are closed w/ distal native disease from remaining LAD graft  - c/w ASA and Statin  - BB on hold due to hypotension and need for midodrine - troponin peaked at > 93950   - [facility] 12/06 with IABP placement revealed that 3 grafts are closed w/ distal native disease from remaining LAD graft  - c/w ASA and Statin  - BB on hold due to hypotension and need for midodrine - troponin peaked at > 95301   - [facility] 12/06 with IABP placement revealed that 3 grafts are closed w/ distal native disease from remaining LAD graft  - c/w ASA and Statin  - BB on hold due to hypotension and need for midodrine - troponin peaked at > 78309   - [facility] 12/06 with IABP placement revealed that 3 grafts are closed w/ distal native disease from remaining LAD graft  - c/w ASA and Statin  - c/w Lopressor 25mg BID - troponin peaked at > 05443   - [facility] 12/06 with IABP placement revealed that 3 grafts are closed w/ distal native disease from remaining LAD graft  - c/w ASA and Statin  - c/w Lopressor 25mg BID - troponin peaked at > 35399   - [facility] 12/06 with IABP placement revealed that 3 grafts are closed w/ distal native disease from remaining LAD graft  - c/w ASA and Statin  - c/w Lopressor 25mg BID

## 2023-01-13 NOTE — PROGRESS NOTE ADULT - NSPROGADDITIONALINFOA_GEN_ALL_CORE
Zonia Carroll, AGACNP-BC  Cardiovascular & Thoracic Surgery  [address] [place] [address]  Office: [phone]    Available on [employer] Teams  Spectra: [phone]  New Consults: [id number] Zonia Carroll, AGACNP-BC  Cardiovascular & Thoracic Surgery  [address] [place] [address]  Office: [phone]    Available on [employer] Teams  Spectra: [phone]  New Consults: [phone] Zonia Carroll, AGACNP-BC  Cardiovascular & Thoracic Surgery  [address] [place] [address]  Office: [phone]    Available on [employer] Teams  Spectra: [phone]  New Consults: [phone]

## 2023-01-13 NOTE — PROGRESS NOTE ADULT - PROBLEM SELECTOR PLAN 2
ECMO decannulated 12/08 after successful wean with IABP and inotropes. IABP removed 12/17  Had severe MR post ECMO removal which prevented appropriate weaning of IABP. Underwent mitral clip on 12/16 with reduction of MR to mild

## 2023-01-13 NOTE — PROGRESS NOTE ADULT - PROBLEM SELECTOR PLAN 4
- CT abd 11/24 showing acute pancreatitis, RUQ showing sludge, no stones  - lipase > 3000 11/24, normalized prior and now fluctuating  - followed by GI and surgery with no plan for intervention

## 2023-01-13 NOTE — PROGRESS NOTE ADULT - ASSESSMENT
61 YO M with a history of CAD s/p 4v CABG ~2019 in [facility], DM2 (A1c 7.3%), and HTN who initially presented to OSH with abdominal pain and n/v and found to have pancreatitis with lipase > 3000 though also with elevated troponins. CT abdomen revealed acute pancreatitis and his initial LVEF was 40-45%. He developed MSOF with hypoxic respiratory failure requiring intubation and ERIC and went into hypoxic PEA arrest requiring ACLS and due to persistent hypoxia and hypotension on pressors after ROSC was cannulated to [facility] ECMO (LFV, RFA, no anterograde perfusion catheter) on 11/25. Notably CTH that day revealed small subdural hemorrhage.     His post arrest TTE on 11/26 showed a significantly worse LVEF of 5-10% with an AV that was only minimally opening. Since then he has had improvement in his LV function to ~35-40% and improved pulsatility while tolerating progressive slow ECMO weans. ECMO turndown (note in chart 11/30) was reassuring for ability to decannulate to IABP/inotropes. LHC 12/06 showed closure of his 3 vein grafts with graft to LAD patent though with distal native disease. No coronary intervention was done. IABP placed, ECMO successfully decannulated 12/08 (transfused 2u pRBCs during).     His ARDS has improved and he's now s/p trach (decannulated 1/8). He's now off IABP, removed 12/17, and inotropes, however has previously been unable to tolerate GDMT due to soft BPs and renal failure requiring dialysis. 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 He's afebrile with improving leukocytosis. He has urine output but not enough to stay euvolemic. He's tolerating intermittent HD and remains on midodrine for BP support.     11/24 Transfer from [facility] to SICU c/f STEMI, abd US +GB stones, pericholecystic fluid  11/25 [facility] ECMO s/p hypoxic respiratory arrest/PEA arrest, CTH 4mm subdural, ERIC  12/5 CVVHD, RUQ US neg   12/6 IABP placed in cath lab, LIMA to LAD patent, RCA and LCx down, CTH subdural decreased in size, persistent pancreatic inflammation   12/7 TTE turndown, EF 30-40%, nml RV, MR mod, mod TR  12/8 ECMO decannulation, aflutter DCCV x 2  12/12 Mental status change- CTH no change, CT perfusion/CTA H/N neg for LVO, +low grade watershed infarct to L MCA  12/16 Mitral clip x 1, TRACH 7 Shiley XLT w/ ENT  12/17 IABP dced  12/26 +clinton BCx   12/27 R groin vac  1/8 trach decannulation   1/10 passed FEEST  1/11 R permacath placed by IR  1/13 Transferred to SDU 61 YO M with a history of CAD s/p 4v CABG ~2019 in [facility], DM2 (A1c 7.3%), and HTN who initially presented to OSH with abdominal pain and n/v and found to have pancreatitis with lipase > 3000 though also with elevated troponins. CT abdomen revealed acute pancreatitis and his initial LVEF was 40-45%. He developed MSOF with hypoxic respiratory failure requiring intubation and ERIC and went into hypoxic PEA arrest requiring ACLS and due to persistent hypoxia and hypotension on pressors after ROSC was cannulated to [facility] ECMO (LFV, RFA, no anterograde perfusion catheter) on 11/25. Notably CTH that day revealed small subdural hemorrhage.     His post arrest TTE on 11/26 showed a significantly worse LVEF of 5-10% with an AV that was only minimally opening. Since then he has had improvement in his LV function to ~35-40% and improved pulsatility while tolerating progressive slow ECMO weans. ECMO turndown (note in chart 11/30) was reassuring for ability to decannulate to IABP/inotropes. LHC 12/06 showed closure of his 3 vein grafts with graft to LAD patent though with distal native disease. No coronary intervention was done. IABP placed, ECMO successfully decannulated 12/08 (transfused 2u pRBCs during).     His ARDS has improved and he's now s/p trach (decannulated 1/8). He's now off IABP, removed 12/17, and inotropes, however has previously been unable to tolerate GDMT due to soft BPs and renal failure requiring dialysis. 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 He's afebrile with improving leukocytosis. He has urine output but not enough to stay euvolemic. He's tolerating intermittent HD and remains on midodrine for BP support.     11/24 Transfer from [facility] to SICU c/f STEMI, abd US +GB stones, pericholecystic fluid  11/25 VA ECMO s/p hypoxic respiratory arrest/PEA arrest, CTH 4mm subdural, ERIC  12/5 CVVHD, RUQ US neg   12/6 IABP placed in cath lab, LIMA to LAD patent, RCA and LCx down, CTH subdural decreased in size, persistent pancreatic inflammation   12/7 TTE turndown, EF 30-40%, nml RV, MR mod, mod TR  12/8 ECMO decannulation, aflutter DCCV x 2  12/12 Mental status change- CTH no change, CT perfusion/CTA H/N neg for LVO, +low grade watershed infarct to L MCA  12/16 Mitral clip x 1, TRACH 7 Shiley XLT w/ ENT  12/17 IABP dced  12/26 +clinton BCx   12/27 R groin vac  1/8 trach decannulation   1/10 passed FEEST  1/11 R permacath placed by IR  1/13 Transferred to SDU 63 YO M with a history of CAD s/p 4v CABG ~2019 in [facility], DM2 (A1c 7.3%), and HTN who initially presented to OSH with abdominal pain and n/v and found to have pancreatitis with lipase > 3000 though also with elevated troponins. CT abdomen revealed acute pancreatitis and his initial LVEF was 40-45%. He developed MSOF with hypoxic respiratory failure requiring intubation and ERIC and went into hypoxic PEA arrest requiring ACLS and due to persistent hypoxia and hypotension on pressors after ROSC was cannulated to [facility] ECMO (LFV, RFA, no anterograde perfusion catheter) on 11/25. Notably CTH that day revealed small subdural hemorrhage.     His post arrest TTE on 11/26 showed a significantly worse LVEF of 5-10% with an AV that was only minimally opening. Since then he has had improvement in his LV function to ~35-40% and improved pulsatility while tolerating progressive slow ECMO weans. ECMO turndown (note in chart 11/30) was reassuring for ability to decannulate to IABP/inotropes. LHC 12/06 showed closure of his 3 vein grafts with graft to LAD patent though with distal native disease. No coronary intervention was done. IABP placed, ECMO successfully decannulated 12/08 (transfused 2u pRBCs during).     His ARDS has improved and he's now s/p trach (decannulated 1/8). He's now off IABP, removed 12/17, and inotropes, however has previously been unable to tolerate GDMT due to soft BPs and renal failure requiring dialysis. 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 He's afebrile with improving leukocytosis. He has urine output but not enough to stay euvolemic. He's tolerating intermittent HD and remains on midodrine for BP support.     11/24 Transfer from [facility] to SICU c/f STEMI, abd US +GB stones, pericholecystic fluid  11/25 [facility] ECMO s/p hypoxic respiratory arrest/PEA arrest, CTH 4mm subdural, ERIC  12/5 CVVHD, RUQ US neg   12/6 IABP placed in cath lab, LIMA to LAD patent, RCA and LCx down, CTH subdural decreased in size, persistent pancreatic inflammation   12/7 TTE turndown, EF 30-40%, nml RV, MR mod, mod TR  12/8 ECMO decannulation, aflutter DCCV x 2  12/12 Mental status change- CTH no change, CT perfusion/CTA H/N neg for LVO, +low grade watershed infarct to L MCA  12/16 Mitral clip x 1, TRACH 7 Shiley XLT w/ ENT  12/17 IABP dced  12/26 +clinton BCx   12/27 R groin vac  1/8 trach decannulation   1/10 passed FEEST  1/11 R permacath placed by IR  1/13 Transferred to SDU

## 2023-01-13 NOTE — PROGRESS NOTE ADULT - SUBJECTIVE AND OBJECTIVE BOX
Subjective:    Medications:  acetaminophen    Suspension .. 650 milliGRAM(s) Oral every 6 hours PRN  aMIOdarone    Tablet 200 milliGRAM(s) Oral daily  aspirin  chewable 81 milliGRAM(s) Oral daily  atorvastatin 80 milliGRAM(s) Oral at bedtime  benzonatate 100 milliGRAM(s) Oral every 8 hours  chlorhexidine 2% Cloths 1 Application(s) Topical <User Schedule>  chlorhexidine 4% Liquid 1 Application(s) Topical <User Schedule>  epoetin teena-epbx (RETACRIT) Injectable 5000 Unit(s) IV Push <User Schedule>  guaiFENesin Oral Liquid (Sugar-Free) 200 milliGRAM(s) Oral every 6 hours PRN  heparin   Injectable 5000 Unit(s) SubCutaneous every 8 hours  hydrocodone/homatropine Syrup 5 milliLiter(s) Oral every 8 hours PRN  insulin lispro (ADMELOG) corrective regimen sliding scale   SubCutaneous every 6 hours  insulin NPH human recombinant 7 Unit(s) SubCutaneous every 6 hours  metoprolol tartrate 25 milliGRAM(s) Oral two times a day  midodrine 20 milliGRAM(s) Oral every 8 hours  multivitamin/minerals/iron Oral Solution (CENTRUM) 15 milliLiter(s) Oral daily  ondansetron Injectable 4 milliGRAM(s) IV Push once  pantoprazole  Injectable 40 milliGRAM(s) IV Push daily  polyethylene glycol 3350 17 Gram(s) Oral daily  senna 2 Tablet(s) Oral at bedtime  sevelamer carbonate 1600 milliGRAM(s) Oral every 8 hours  simethicone 80 milliGRAM(s) Chew daily PRN  sodium chloride 0.9% lock flush 10 milliLiter(s) IV Push every 1 hour PRN  thiamine 100 milliGRAM(s) Enteral Tube daily      Physical Exam:    Vitals:  Vital Signs Last 24 Hours  T(C): 36.1 (23 @ 08:00), Max: 36.5 (23 @ 16:00)  HR: 83 (23 @ 11:00) (79 - 116)  BP: 103/58 (23 @ 11:00) (91/59 - 125/74)  RR: 12 (23 @ 11:00) (10 - 29)  SpO2: 100% (01-13-23 @ 11:00) (97% - 100%)    Weight in k.6 ( @ 00:00)    I&O's Summary    2023 07:01  -  2023 07:00  --------------------------------------------------------  IN: 700 mL / OUT: 275 mL / NET: 425 mL    2023 07:01  -  2023 11:23  --------------------------------------------------------  IN: 0 mL / OUT: 100 mL / NET: -100 mL        Tele:    General: No distress. Comfortable.  HEENT: EOM intact.  Neck: Neck supple. JVP not elevated. No masses  Chest: Clear to auscultation bilaterally  CV: Normal S1 and S2. No murmurs, rub, or gallops. Radial pulses normal.  Abdomen: Soft, non-distended, non-tender  Skin: No rashes or skin breakdown  Neurology: Alert and oriented times three. Sensation intact  Psych: Affect normal    Labs:                        9.4    12.59 )-----------( 196      ( 2023 00:49 )             30.4         132<L>  |  94<L>  |  52<H>  ----------------------------<  145<H>  4.4   |  23  |  4.21<H>    Ca    9.2      2023 00:49  Phos  5.3       Mg     2.8         TPro  7.9  /  Alb  3.6  /  TBili  0.7  /  DBili  x   /  AST  19  /  ALT  12  /  AlkPhos  88                     ADVANCED HEART FAILURE & TRANSPLANT  - PROGRESS NOTE  *To reach the NS1 Team from 8am to 5pm, please call [phone].   _______________________________________________________________________________________________________    Subjective:  - NAEO      Medications:  acetaminophen    Suspension .. 650 milliGRAM(s) Oral every 6 hours PRN  aMIOdarone    Tablet 200 milliGRAM(s) Oral daily  aspirin  chewable 81 milliGRAM(s) Oral daily  atorvastatin 80 milliGRAM(s) Oral at bedtime  benzonatate 100 milliGRAM(s) Oral every 8 hours  chlorhexidine 2% Cloths 1 Application(s) Topical <User Schedule>  chlorhexidine 4% Liquid 1 Application(s) Topical <User Schedule>  epoetin teena-epbx (RETACRIT) Injectable 5000 Unit(s) IV Push <User Schedule>  guaiFENesin Oral Liquid (Sugar-Free) 200 milliGRAM(s) Oral every 6 hours PRN  heparin   Injectable 5000 Unit(s) SubCutaneous every 8 hours  hydrocodone/homatropine Syrup 5 milliLiter(s) Oral every 8 hours PRN  insulin lispro (ADMELOG) corrective regimen sliding scale   SubCutaneous every 6 hours  insulin NPH human recombinant 7 Unit(s) SubCutaneous every 6 hours  metoprolol tartrate 25 milliGRAM(s) Oral two times a day  midodrine 20 milliGRAM(s) Oral every 8 hours  multivitamin/minerals/iron Oral Solution (CENTRUM) 15 milliLiter(s) Oral daily  ondansetron Injectable 4 milliGRAM(s) IV Push once  pantoprazole  Injectable 40 milliGRAM(s) IV Push daily  polyethylene glycol 3350 17 Gram(s) Oral daily  senna 2 Tablet(s) Oral at bedtime  sevelamer carbonate 1600 milliGRAM(s) Oral every 8 hours  simethicone 80 milliGRAM(s) Chew daily PRN  sodium chloride 0.9% lock flush 10 milliLiter(s) IV Push every 1 hour PRN  thiamine 100 milliGRAM(s) Enteral Tube daily      Physical Exam:    Vitals:  Vital Signs Last 24 Hours  T(C): 36.1 (23 @ 08:00), Max: 36.5 (23 @ 16:00)  HR: 83 (23 @ 11:00) (79 - 116)  BP: 103/58 (23 @ 11:00) (91/59 - 125/74)  RR: 12 (23 @ 11:00) (10 - 29)  SpO2: 100% (23 @ 11:00) (97% - 100%)    Weight in k.6 ( @ 00:00)    I&O's Summary    2023 07:01  -  2023 07:00  --------------------------------------------------------  IN: 700 mL / OUT: 275 mL / NET: 425 mL    2023 07:01  -  2023 11:23  --------------------------------------------------------  IN: 0 mL / OUT: 100 mL / NET: -100 mL    Tele: SR    General: No distress. Comfortable.  HEENT: EOM intact.  Neck: Neck supple. JVP not elevated. No masses  Chest: Clear to auscultation bilaterally  CV: Normal S1 and S2. No murmurs, rub, or gallops. Radial pulses normal.  Abdomen: Soft, non-distended, non-tender  Skin: No rashes or skin breakdown  Neurology: Alert and oriented times three. Sensation intact  Psych: Affect normal    Labs:                        9.4    12.59 )-----------( 196      ( 2023 00:49 )             30.4         132<L>  |  94<L>  |  52<H>  ----------------------------<  145<H>  4.4   |  23  |  4.21<H>    Ca    9.2      2023 00:49  Phos  5.3       Mg     2.8         TPro  7.9  /  Alb  3.6  /  TBili  0.7  /  DBili  x   /  AST  19  /  ALT  12  /  AlkPhos  88   ADVANCED HEART FAILURE & TRANSPLANT  - PROGRESS NOTE  *To reach the NS1 Team from 8am to 5pm, please call [phone].   _______________________________________________________________________________________________________    Subjective:  - NAEO      Medications:  acetaminophen    Suspension .. 650 milliGRAM(s) Oral every 6 hours PRN  aMIOdarone    Tablet 200 milliGRAM(s) Oral daily  aspirin  chewable 81 milliGRAM(s) Oral daily  atorvastatin 80 milliGRAM(s) Oral at bedtime  benzonatate 100 milliGRAM(s) Oral every 8 hours  chlorhexidine 2% Cloths 1 Application(s) Topical <User Schedule>  chlorhexidine 4% Liquid 1 Application(s) Topical <User Schedule>  epoetin teena-epbx (RETACRIT) Injectable 5000 Unit(s) IV Push <User Schedule>  guaiFENesin Oral Liquid (Sugar-Free) 200 milliGRAM(s) Oral every 6 hours PRN  heparin   Injectable 5000 Unit(s) SubCutaneous every 8 hours  hydrocodone/homatropine Syrup 5 milliLiter(s) Oral every 8 hours PRN  insulin lispro (ADMELOG) corrective regimen sliding scale   SubCutaneous every 6 hours  insulin NPH human recombinant 7 Unit(s) SubCutaneous every 6 hours  metoprolol tartrate 25 milliGRAM(s) Oral two times a day  midodrine 20 milliGRAM(s) Oral every 8 hours  multivitamin/minerals/iron Oral Solution (CENTRUM) 15 milliLiter(s) Oral daily  ondansetron Injectable 4 milliGRAM(s) IV Push once  pantoprazole  Injectable 40 milliGRAM(s) IV Push daily  polyethylene glycol 3350 17 Gram(s) Oral daily  senna 2 Tablet(s) Oral at bedtime  sevelamer carbonate 1600 milliGRAM(s) Oral every 8 hours  simethicone 80 milliGRAM(s) Chew daily PRN  sodium chloride 0.9% lock flush 10 milliLiter(s) IV Push every 1 hour PRN  thiamine 100 milliGRAM(s) Enteral Tube daily      Physical Exam:    Vitals:  Vital Signs Last 24 Hours  T(C): 36.1 (23 @ 08:00), Max: 36.5 (23 @ 16:00)  HR: 83 (23 @ 11:00) (79 - 116)  BP: 103/58 (23 @ 11:00) (91/59 - 125/74)  RR: 12 (23 @ 11:00) (10 - 29)  SpO2: 100% (23 @ 11:00) (97% - 100%)    Weight in k.6 ( @ 00:00)    I&O's Summary    2023 07:01  -  2023 07:00  --------------------------------------------------------  IN: 700 mL / OUT: 275 mL / NET: 425 mL    2023 07:01  -  2023 11:23  --------------------------------------------------------  IN: 0 mL / OUT: 100 mL / NET: -100 mL    Tele: SR    General: No distress. Comfortable.  HEENT: EOM intact.  Neck: Neck supple. JVP not elevated. No masses  Chest: Clear to auscultation bilaterally  CV: Normal S1 and S2. No murmurs, rub, or gallops. Radial pulses normal.  Abdomen: Soft, non-distended, non-tender  Skin: No rashes or skin breakdown  Neurology: Alert and oriented times three. Sensation intact  Psych: Affect normal    Labs:                        9.4    12.59 )-----------( 196      ( 2023 00:49 )             30.4         132<L>  |  94<L>  |  52<H>  ----------------------------<  145<H>  4.4   |  23  |  4.21<H>    Ca    9.2      2023 00:49  Phos  5.3       Mg     2.8         TPro  7.9  /  Alb  3.6  /  TBili  0.7  /  DBili  x   /  AST  19  /  ALT  12  /  AlkPhos  88   ADVANCED HEART FAILURE & TRANSPLANT  - PROGRESS NOTE  *To reach the NS1 Team from 8am to 5pm, please call [phone].   _______________________________________________________________________________________________________    Subjective:  - NAEO      Medications:  acetaminophen    Suspension .. 650 milliGRAM(s) Oral every 6 hours PRN  aMIOdarone    Tablet 200 milliGRAM(s) Oral daily  aspirin  chewable 81 milliGRAM(s) Oral daily  atorvastatin 80 milliGRAM(s) Oral at bedtime  benzonatate 100 milliGRAM(s) Oral every 8 hours  chlorhexidine 2% Cloths 1 Application(s) Topical <User Schedule>  chlorhexidine 4% Liquid 1 Application(s) Topical <User Schedule>  epoetin teena-epbx (RETACRIT) Injectable 5000 Unit(s) IV Push <User Schedule>  guaiFENesin Oral Liquid (Sugar-Free) 200 milliGRAM(s) Oral every 6 hours PRN  heparin   Injectable 5000 Unit(s) SubCutaneous every 8 hours  hydrocodone/homatropine Syrup 5 milliLiter(s) Oral every 8 hours PRN  insulin lispro (ADMELOG) corrective regimen sliding scale   SubCutaneous every 6 hours  insulin NPH human recombinant 7 Unit(s) SubCutaneous every 6 hours  metoprolol tartrate 25 milliGRAM(s) Oral two times a day  midodrine 20 milliGRAM(s) Oral every 8 hours  multivitamin/minerals/iron Oral Solution (CENTRUM) 15 milliLiter(s) Oral daily  ondansetron Injectable 4 milliGRAM(s) IV Push once  pantoprazole  Injectable 40 milliGRAM(s) IV Push daily  polyethylene glycol 3350 17 Gram(s) Oral daily  senna 2 Tablet(s) Oral at bedtime  sevelamer carbonate 1600 milliGRAM(s) Oral every 8 hours  simethicone 80 milliGRAM(s) Chew daily PRN  sodium chloride 0.9% lock flush 10 milliLiter(s) IV Push every 1 hour PRN  thiamine 100 milliGRAM(s) Enteral Tube daily      Physical Exam:    Vitals:  Vital Signs Last 24 Hours  T(C): 36.1 (23 @ 08:00), Max: 36.5 (23 @ 16:00)  HR: 83 (23 @ 11:00) (79 - 116)  BP: 103/58 (23 @ 11:00) (91/59 - 125/74)  RR: 12 (23 @ 11:00) (10 - 29)  SpO2: 100% (23 @ 11:00) (97% - 100%)    Weight in k.6 ( @ 00:00)    I&O's Summary    2023 07:01  -  2023 07:00  --------------------------------------------------------  IN: 700 mL / OUT: 275 mL / NET: 425 mL    2023 07:01  -  2023 11:23  --------------------------------------------------------  IN: 0 mL / OUT: 100 mL / NET: -100 mL    Tele: SR    General: No distress. Comfortable.  HEENT: EOM intact.  Neck: Neck supple. JVP not elevated. No masses  Chest: Clear to auscultation bilaterally  CV: Normal S1 and S2. No murmurs, rub, or gallops. Radial pulses normal.  Abdomen: Soft, non-distended, non-tender  Skin: No rashes or skin breakdown  Neurology: Alert and oriented times three. Sensation intact  Psych: Affect normal    Labs:                        9.4    12.59 )-----------( 196      ( 2023 00:49 )             30.4         132<L>  |  94<L>  |  52<H>  ----------------------------<  145<H>  4.4   |  23  |  4.21<H>    Ca    9.2      2023 00:49  Phos  5.3       Mg     2.8         TPro  7.9  /  Alb  3.6  /  TBili  0.7  /  DBili  x   /  AST  19  /  ALT  12  /  AlkPhos  88

## 2023-01-13 NOTE — PROGRESS NOTE ADULT - SUBJECTIVE AND OBJECTIVE BOX
VITAL SIGNS    Telemetry:  SR 80-90s  Overnight Events: no acute events    Vital Signs Last 24 Hrs  T(C): 36.5 (23 @ 16:30), Max: 36.5 (23 @ 16:00)  T(F): 97.7 (23 @ 16:30), Max: 97.7 (23 @ 16:00)  HR: 90 (23 @ 18:28) (79 - 108)  BP: 98/54 (23 @ 18:00) (91/51 - 118/67)  RR: 19 (23 @ 18:28) (10 - 29)  SpO2: 100% (23 @ 18:28) (97% - 100%)             @ 07:01  -   @ 07:00  --------------------------------------------------------  IN: 700 mL / OUT: 275 mL / NET: 425 mL     @ 07:  -   @ 18:58  --------------------------------------------------------  IN: 0 mL / OUT: 1600 mL / NET: -1600 mL       Daily     Daily Weight in k.6 (2023 00:00)  Admit Wt: Drug Dosing Weight  Height (cm): 172.7 (2023 17:58)  Weight (kg): 92.9 (2023 17:58)  BMI (kg/m2): 31.1 (2023 17:58)  BSA (m2): 2.06 (2023 17:58)    Bilirubin Total, Serum: 0.7 mg/dL ( @ 00:49)    CAPILLARY BLOOD GLUCOSE      POCT Blood Glucose.: 87 mg/dL (2023 16:36)  POCT Blood Glucose.: 143 mg/dL (2023 12:29)  POCT Blood Glucose.: 152 mg/dL (2023 10:22)  POCT Blood Glucose.: 95 mg/dL (2023 05:47)  POCT Blood Glucose.: 141 mg/dL (2023 00:27)          acetaminophen    Suspension .. 650 milliGRAM(s) Oral every 6 hours PRN  aMIOdarone    Tablet 200 milliGRAM(s) Oral daily  aspirin  chewable 81 milliGRAM(s) Oral daily  atorvastatin 80 milliGRAM(s) Oral at bedtime  benzonatate 100 milliGRAM(s) Oral every 8 hours  chlorhexidine 2% Cloths 1 Application(s) Topical <User Schedule>  chlorhexidine 4% Liquid 1 Application(s) Topical <User Schedule>  epoetin teena-epbx (RETACRIT) Injectable 5000 Unit(s) IV Push <User Schedule>  guaiFENesin Oral Liquid (Sugar-Free) 200 milliGRAM(s) Oral every 6 hours PRN  heparin   Injectable 5000 Unit(s) SubCutaneous every 8 hours  hydrocodone/homatropine Syrup 5 milliLiter(s) Oral every 8 hours PRN  insulin lispro (ADMELOG) corrective regimen sliding scale   SubCutaneous every 6 hours  insulin NPH human recombinant 7 Unit(s) SubCutaneous every 6 hours  metoprolol tartrate 25 milliGRAM(s) Oral two times a day  midodrine 20 milliGRAM(s) Oral every 8 hours  multivitamin/minerals/iron Oral Solution (CENTRUM) 15 milliLiter(s) Oral daily  pantoprazole  Injectable 40 milliGRAM(s) IV Push daily  polyethylene glycol 3350 17 Gram(s) Oral daily  senna 2 Tablet(s) Oral at bedtime  sevelamer carbonate 1600 milliGRAM(s) Oral every 8 hours  simethicone 80 milliGRAM(s) Chew daily PRN  sodium chloride 0.9% lock flush 10 milliLiter(s) IV Push every 1 hour PRN  thiamine 100 milliGRAM(s) Enteral Tube daily                            9.4    12.59 )-----------( 196      ( 2023 00:49 )             30.4     01-13    132<L>  |  94<L>  |  52<H>  ----------------------------<  145<H>  4.4   |  23  |  4.21<H>    Ca    9.2      2023 00:49  Phos  5.3       Mg     2.8         TPro  7.9  /  Alb  3.6  /  TBili  0.7  /  DBili  x   /  AST  19  /  ALT  12  /  AlkPhos  88          PHYSICAL EXAM    Subjective: "I feel well"  General: well appearing male, in no acute distress, sitting/laying in bed/chair. POD #28  Neurology: alert and oriented x 3, nonfocal, no gross deficits  CV : S1/S2, no murmurs/rubs/gallops  Lungs: clear. RR easy, unlabored   Abdomen: soft, nontender, nondistended, positive bowel sounds, +bowel movement x 2 days ago, Neg N/V/D   :  pt voiding without difficulty   Extremities: COBB; no edema, neg calf tenderness. PPP bilaterally, groins stable w/ R groin wound vac  Skin: R permacath in place        Coumadin    [ ] YES          [x]      NO         Eliquis    [ ] YES          [x]      NO       Heparin gtt   [  ] YES              [x]   NO  Dose:    Disposition:  Home [   ]     Rehab [ x  ]       OR Date:    Plan of care discussed with Cardiothoracic Team at AM rounds.

## 2023-01-13 NOTE — PROGRESS NOTE ADULT - ASSESSMENT
63 YO M with a history of CAD s/p 4v CABG ~2019 in [facility], DM2 (A1c 7.3%), and HTN who initially presented to OSH with abdominal pain and n/v and found to have pancreatitis with lipase > 3000 though also with elevated troponins. CT abdomen revealed acute pancreatitis and his initial LVEF was 40-45%. He developed MSOF with hypoxic respiratory failure requiring intubation and ERIC and went into hypoxic PEA arrest requiring ACLS and due to persistent hypoxia and hypotension on pressors after ROSC was cannulated to [facility]O (LFV, RFA, no anterograde perfusion catheter) on 11/25. Notably CTH that day revealed small subdural hemorrhage.     His post arrest TTE on 11/26 showed a significantly worse LVEF of 5-10% with an AV that was only minimally opening. Since then he has had improvement in his LV function to ~35-40% and improved pulsatility while tolerating progressive slow ECMO weans. ECMO turndown (note in chart 11/30) was reassuring for ability to decannulate to IABP/inotropes. LHC 12/06 showed closure of his 3 vein grafts with graft to LAD patent though with distal native disease. No coronary intervention was done. IABP placed, ECMO successfully decannulated 12/08 (transfused 2u pRBCs during).     His ARDS has improved and he's now s/p trach (decannulated 1/8). He's now off IABP, removed 12/17, and inotropes, however has previously been unable to tolerate GDMT due to soft BPs and renal failure requiring dialysis. 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 He's afebrile with improving leukocytosis. He has urine output but not enough to stay euvolemic. He's tolerating intermittent HD and remains on midodrine for BP support.       Previous cardiac studies:  TTE 1/9/23 - EF 25%, mild MR, min AI, no TR, no obvious valvular vegetations.  TTE 12/19/22 - EF 24%, LVIDd 5.6, normal RV function, estimated PASP 45mmhg  LHC (12/06/22) - patent LIMA-LAD, RCA , LCx , aortogram w/ closure of 3 vein grafts, LVEDP 22 mm Hg, left-to-left and left-to-right collaterals  TTE (12/03/22) - mod-to-sev segmental LVSD (inferolateral, inferior, inferoseptal hypokinesis) 61 YO M with a history of CAD s/p 4v CABG ~2019 in [facility], DM2 (A1c 7.3%), and HTN who initially presented to OSH with abdominal pain and n/v and found to have pancreatitis with lipase > 3000 though also with elevated troponins. CT abdomen revealed acute pancreatitis and his initial LVEF was 40-45%. He developed MSOF with hypoxic respiratory failure requiring intubation and ERIC and went into hypoxic PEA arrest requiring ACLS and due to persistent hypoxia and hypotension on pressors after ROSC was cannulated to [facility] ECMO (LFV, RFA, no anterograde perfusion catheter) on 11/25. Notably CTH that day revealed small subdural hemorrhage.     His post arrest TTE on 11/26 showed a significantly worse LVEF of 5-10% with an AV that was only minimally opening. Since then he has had improvement in his LV function to ~35-40% and improved pulsatility while tolerating progressive slow ECMO weans. ECMO turndown (note in chart 11/30) was reassuring for ability to decannulate to IABP/inotropes. LHC 12/06 showed closure of his 3 vein grafts with graft to LAD patent though with distal native disease. No coronary intervention was done. IABP placed, ECMO successfully decannulated 12/08 (transfused 2u pRBCs during).     His ARDS has improved and he's now s/p trach (decannulated 1/8). He's now off IABP, removed 12/17, and inotropes, however has previously been unable to tolerate GDMT due to soft BPs and renal failure requiring dialysis. 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 He's afebrile with improving leukocytosis. He has urine output but not enough to stay euvolemic. He's tolerating intermittent HD and remains on midodrine for BP support.       Previous cardiac studies:  TTE 1/9/23 - EF 25%, mild MR, min AI, no TR, no obvious valvular vegetations.  TTE 12/19/22 - EF 24%, LVIDd 5.6, normal RV function, estimated PASP 45mmhg  LHC (12/06/22) - patent LIMA-LAD, RCA , LCx , aortogram w/ closure of 3 vein grafts, LVEDP 22 mm Hg, left-to-left and left-to-right collaterals  TTE (12/03/22) - mod-to-sev segmental LVSD (inferolateral, inferior, inferoseptal hypokinesis) 63 YO M with a history of CAD s/p 4v CABG ~2019 in [facility], DM2 (A1c 7.3%), and HTN who initially presented to OSH with abdominal pain and n/v and found to have pancreatitis with lipase > 3000 though also with elevated troponins. CT abdomen revealed acute pancreatitis and his initial LVEF was 40-45%. He developed MSOF with hypoxic respiratory failure requiring intubation and ERIC and went into hypoxic PEA arrest requiring ACLS and due to persistent hypoxia and hypotension on pressors after ROSC was cannulated to [facility] ECMO (LFV, RFA, no anterograde perfusion catheter) on 11/25. Notably CTH that day revealed small subdural hemorrhage.     His post arrest TTE on 11/26 showed a significantly worse LVEF of 5-10% with an AV that was only minimally opening. Since then he has had improvement in his LV function to ~35-40% and improved pulsatility while tolerating progressive slow ECMO weans. ECMO turndown (note in chart 11/30) was reassuring for ability to decannulate to IABP/inotropes. LHC 12/06 showed closure of his 3 vein grafts with graft to LAD patent though with distal native disease. No coronary intervention was done. IABP placed, ECMO successfully decannulated 12/08 (transfused 2u pRBCs during).     His ARDS has improved and he's now s/p trach (decannulated 1/8). He's now off IABP, removed 12/17, and inotropes, however has previously been unable to tolerate GDMT due to soft BPs and renal failure requiring dialysis. 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 He's afebrile with improving leukocytosis. He has urine output but not enough to stay euvolemic. He's tolerating intermittent HD and remains on midodrine for BP support.       Previous cardiac studies:  TTE 1/9/23 - EF 25%, mild MR, min AI, no TR, no obvious valvular vegetations.  TTE 12/19/22 - EF 24%, LVIDd 5.6, normal RV function, estimated PASP 45mmhg  LHC (12/06/22) - patent LIMA-LAD, RCA , LCx , aortogram w/ closure of 3 vein grafts, LVEDP 22 mm Hg, left-to-left and left-to-right collaterals  TTE (12/03/22) - mod-to-sev segmental LVSD (inferolateral, inferior, inferoseptal hypokinesis)

## 2023-01-13 NOTE — PROGRESS NOTE ADULT - SUBJECTIVE AND OBJECTIVE BOX
CRITICAL CARE ATTENDING - CTICU    MEDICATIONS  (STANDING):  aMIOdarone    Tablet 200 milliGRAM(s) Oral daily  aspirin  chewable 81 milliGRAM(s) Oral daily  atorvastatin 80 milliGRAM(s) Oral at bedtime  benzonatate 100 milliGRAM(s) Oral every 8 hours  chlorhexidine 2% Cloths 1 Application(s) Topical <User Schedule>  chlorhexidine 4% Liquid 1 Application(s) Topical <User Schedule>  epoetin teena-epbx (RETACRIT) Injectable 5000 Unit(s) IV Push <User Schedule>  heparin   Injectable 5000 Unit(s) SubCutaneous every 8 hours  insulin lispro (ADMELOG) corrective regimen sliding scale   SubCutaneous every 6 hours  insulin NPH human recombinant 7 Unit(s) SubCutaneous every 6 hours  metoprolol tartrate 25 milliGRAM(s) Oral two times a day  midodrine 20 milliGRAM(s) Oral every 8 hours  multivitamin/minerals/iron Oral Solution (CENTRUM) 15 milliLiter(s) Oral daily  pantoprazole  Injectable 40 milliGRAM(s) IV Push daily  polyethylene glycol 3350 17 Gram(s) Oral daily  senna 2 Tablet(s) Oral at bedtime  sevelamer carbonate 1600 milliGRAM(s) Oral every 8 hours  thiamine 100 milliGRAM(s) Enteral Tube daily                                    9.4    12.59 )-----------( 196      ( 2023 00:49 )             30.4           132<L>  |  94<L>  |  52<H>  ----------------------------<  145<H>  4.4   |  23  |  4.21<H>    Ca    9.2      2023 00:49  Phos  5.3       Mg     2.8         TPro  7.9  /  Alb  3.6  /  TBili  0.7  /  DBili  x   /  AST  19  /  ALT  12  /  AlkPhos  88                Daily     Daily Weight in k.6 (2023 00:00)      11 @ 07:01  -  12 @ 07:00  --------------------------------------------------------  IN: 280 mL / OUT: 2300 mL / NET: -2020 mL     @ 07: @ 06:12  --------------------------------------------------------  IN: 640 mL / OUT: 225 mL / NET: 415 mL        Critically Ill patient  : [ ] preoperative ,   [x ] post operative    Requires :  [ ] Arterial Line   [ ] Central Line  [ ] PA catheter  [ ] IABP  [ ] ECMO  [ ] LVAD  [ ] Ventilator  [ ] pacemaker [ ] Impella.                      [ x] ABG's     [x ] Pulse Oxymetry Monitoring  Bedside evaluation , monitoring , treatment of hemodynamics , fluids , IVP/ IVCD meds.        Diagnosis:     Cardiogenic Shock / STEMI    Pancreatitis     s/p VA ECMO / IABP - Decannulated     Respiratory Failure  - resolved    Mitral Clip     Tracheostomy - decannulated      Renal Failure - Acute Kidney Injury     Hyponatremia    [ x] Hemodialysis - last night [ ] Hemofiltration:    [ x] negative fluid balance [ ] even fluid balance, in a hemodynamically unstable patient.     CHF- acute [ x]   chronic [ ]    systolic [x ]   diastolic [ ]  Valvular [ ]          - Echo- EF -   30%          [ ] RV dysfunction          - Cxr-cardiomegally, edema          - Clinical-  [ ]inotropes   [x]pressors - PO midodrine   [ ]diuresis   [ ]IABP   [ ]ECMO   [ ]LVAD   [ ]Respiratory Failure    Hypotension     Hemodynamic lability,  instability. Requires  [x ] vasopressors- PO Midodrine [ ] inotropes  [ ] vasodilator  [ ]IVSS fluid  to maintain MAP, perfusion, C.I.     Requires bedside physical therapy, mobilization and total assisted care.     candida in Blood x 1        By signing my name below, I, Maria D'Amico, attest that this documentation has been prepared under the direction and in the presence of Finn Zelaya MD.   Electronically Signed: Maria D'Amico, Scribe 23 @ 06:12      Discussed with CT surgeon, Physician Assistant - Nurse Practitioner- Critical care medicine team.   Discussed at  AM / PM rounds.   Chart, labs , films reviewed.    Cumulative Critical Care Time Given Today:  CRITICAL CARE ATTENDING - [facility]    MEDICATIONS  (STANDING):  aMIOdarone    Tablet 200 milliGRAM(s) Oral daily  aspirin  chewable 81 milliGRAM(s) Oral daily  atorvastatin 80 milliGRAM(s) Oral at bedtime  benzonatate 100 milliGRAM(s) Oral every 8 hours  chlorhexidine 2% Cloths 1 Application(s) Topical <User Schedule>  chlorhexidine 4% Liquid 1 Application(s) Topical <User Schedule>  epoetin teena-epbx (RETACRIT) Injectable 5000 Unit(s) IV Push <User Schedule>  heparin   Injectable 5000 Unit(s) SubCutaneous every 8 hours  insulin lispro (ADMELOG) corrective regimen sliding scale   SubCutaneous every 6 hours  insulin NPH human recombinant 7 Unit(s) SubCutaneous every 6 hours  metoprolol tartrate 25 milliGRAM(s) Oral two times a day  midodrine 20 milliGRAM(s) Oral every 8 hours  multivitamin/minerals/iron Oral Solution (CENTRUM) 15 milliLiter(s) Oral daily  pantoprazole  Injectable 40 milliGRAM(s) IV Push daily  polyethylene glycol 3350 17 Gram(s) Oral daily  senna 2 Tablet(s) Oral at bedtime  sevelamer carbonate 1600 milliGRAM(s) Oral every 8 hours  thiamine 100 milliGRAM(s) Enteral Tube daily                                    9.4    12.59 )-----------( 196      ( 2023 00:49 )             30.4           132<L>  |  94<L>  |  52<H>  ----------------------------<  145<H>  4.4   |  23  |  4.21<H>    Ca    9.2      2023 00:49  Phos  5.3       Mg     2.8         TPro  7.9  /  Alb  3.6  /  TBili  0.7  /  DBili  x   /  AST  19  /  ALT  12  /  AlkPhos  88                Daily     Daily Weight in k.6 (2023 00:00)      11 @ 07:01  -  12 @ 07:00  --------------------------------------------------------  IN: 280 mL / OUT: 2300 mL / NET: -2020 mL     @ 07: @ 06:12  --------------------------------------------------------  IN: 640 mL / OUT: 225 mL / NET: 415 mL        Critically Ill patient  : [ ] preoperative ,   [x ] post operative    Requires :  [ ] Arterial Line   [ ] Central Line  [ ] PA catheter  [ ] IABP  [ ] ECMO  [ ] LVAD  [ ] Ventilator  [ ] pacemaker [ ] Impella.                      [ x] ABG's     [x ] Pulse Oxymetry Monitoring  Bedside evaluation , monitoring , treatment of hemodynamics , fluids , IVP/ IVCD meds.        Diagnosis:     Cardiogenic Shock / STEMI    Pancreatitis     s/p VA ECMO / IABP - Decannulated     Respiratory Failure  - resolved    Mitral Clip     Tracheostomy - decannulated      Renal Failure - Acute Kidney Injury     Hyponatremia    [ x] Hemodialysis - last night [ ] Hemofiltration:    [ x] negative fluid balance [ ] even fluid balance, in a hemodynamically unstable patient.     CHF- acute [ x]   chronic [ ]    systolic [x ]   diastolic [ ]  Valvular [ ]          - Echo- EF -   30%          [ ] RV dysfunction          - Cxr-cardiomegally, edema          - Clinical-  [ ]inotropes   [x]pressors - PO midodrine   [ ]diuresis   [ ]IABP   [ ]ECMO   [ ]LVAD   [ ]Respiratory Failure    Hypotension     Hemodynamic lability,  instability. Requires  [x ] vasopressors- PO Midodrine [ ] inotropes  [ ] vasodilator  [ ]IVSS fluid  to maintain MAP, perfusion, C.I.     Requires bedside physical therapy, mobilization and total FDC care.     candida in Blood x 1        By signing my name below, I, Maria D'Amico, attest that this documentation has been prepared under the direction and in the presence of Finn Zelaya MD.   Electronically Signed: Maria D'Amico, Scribe 23 @ 06:12      Discussed with CT surgeon, Physician Assistant - Nurse Practitioner- Critical care medicine team.   Discussed at  AM / PM rounds.   Chart, labs , films reviewed.    Cumulative Critical Care Time Given Today:  CRITICAL CARE ATTENDING - [facility]    MEDICATIONS  (STANDING):  aMIOdarone    Tablet 200 milliGRAM(s) Oral daily  aspirin  chewable 81 milliGRAM(s) Oral daily  atorvastatin 80 milliGRAM(s) Oral at bedtime  benzonatate 100 milliGRAM(s) Oral every 8 hours  chlorhexidine 2% Cloths 1 Application(s) Topical <User Schedule>  chlorhexidine 4% Liquid 1 Application(s) Topical <User Schedule>  epoetin teena-epbx (RETACRIT) Injectable 5000 Unit(s) IV Push <User Schedule>  heparin   Injectable 5000 Unit(s) SubCutaneous every 8 hours  insulin lispro (ADMELOG) corrective regimen sliding scale   SubCutaneous every 6 hours  insulin NPH human recombinant 7 Unit(s) SubCutaneous every 6 hours  metoprolol tartrate 25 milliGRAM(s) Oral two times a day  midodrine 20 milliGRAM(s) Oral every 8 hours  multivitamin/minerals/iron Oral Solution (CENTRUM) 15 milliLiter(s) Oral daily  pantoprazole  Injectable 40 milliGRAM(s) IV Push daily  polyethylene glycol 3350 17 Gram(s) Oral daily  senna 2 Tablet(s) Oral at bedtime  sevelamer carbonate 1600 milliGRAM(s) Oral every 8 hours  thiamine 100 milliGRAM(s) Enteral Tube daily                                    9.4    12.59 )-----------( 196      ( 2023 00:49 )             30.4           132<L>  |  94<L>  |  52<H>  ----------------------------<  145<H>  4.4   |  23  |  4.21<H>    Ca    9.2      2023 00:49  Phos  5.3       Mg     2.8         TPro  7.9  /  Alb  3.6  /  TBili  0.7  /  DBili  x   /  AST  19  /  ALT  12  /  AlkPhos  88                Daily     Daily Weight in k.6 (2023 00:00)      11 @ 07:01  -  12 @ 07:00  --------------------------------------------------------  IN: 280 mL / OUT: 2300 mL / NET: -2020 mL     @ 07: @ 06:12  --------------------------------------------------------  IN: 640 mL / OUT: 225 mL / NET: 415 mL        Critically Ill patient  : [ ] preoperative ,   [x ] post operative    Requires :  [ ] Arterial Line   [ ] Central Line  [ ] PA catheter  [ ] IABP  [ ] ECMO  [ ] LVAD  [ ] Ventilator  [ ] pacemaker [ ] Impella.                      [ x] ABG's     [x ] Pulse Oxymetry Monitoring  Bedside evaluation , monitoring , treatment of hemodynamics , fluids , IVP/ IVCD meds.        Diagnosis:     Cardiogenic Shock / STEMI    Pancreatitis     s/p VA ECMO / IABP - Decannulated     Respiratory Failure  - resolved    Mitral Clip     Tracheostomy - decannulated      Renal Failure - Acute Kidney Injury     Hyponatremia    [ x] Hemodialysis - last night [ ] Hemofiltration:    [ x] negative fluid balance [ ] even fluid balance, in a hemodynamically unstable patient.     CHF- acute [ x]   chronic [ ]    systolic [x ]   diastolic [ ]  Valvular [ ]          - Echo- EF -   30%          [ ] RV dysfunction          - Cxr-cardiomegally, edema          - Clinical-  [ ]inotropes   [x]pressors - PO midodrine   [ ]diuresis   [ ]IABP   [ ]ECMO   [ ]LVAD   [ ]Respiratory Failure    Hypotension     Hemodynamic lability,  instability. Requires  [x ] vasopressors- PO Midodrine [ ] inotropes  [ ] vasodilator  [ ]IVSS fluid  to maintain MAP, perfusion, C.I.     Requires bedside physical therapy, mobilization and total detention care.     candida in Blood x 1        By signing my name below, I, Maria D'Amico, attest that this documentation has been prepared under the direction and in the presence of Finn Zelaya MD.   Electronically Signed: Maria D'Amico, Scribe 23 @ 06:12      Discussed with CT surgeon, Physician Assistant - Nurse Practitioner- Critical care medicine team.   Discussed at  AM / PM rounds.   Chart, labs , films reviewed.    Cumulative Critical Care Time Given Today:  CRITICAL CARE ATTENDING - [facility]    MEDICATIONS  (STANDING):  aMIOdarone    Tablet 200 milliGRAM(s) Oral daily  aspirin  chewable 81 milliGRAM(s) Oral daily  atorvastatin 80 milliGRAM(s) Oral at bedtime  benzonatate 100 milliGRAM(s) Oral every 8 hours  chlorhexidine 2% Cloths 1 Application(s) Topical <User Schedule>  chlorhexidine 4% Liquid 1 Application(s) Topical <User Schedule>  epoetin teena-epbx (RETACRIT) Injectable 5000 Unit(s) IV Push <User Schedule>  heparin   Injectable 5000 Unit(s) SubCutaneous every 8 hours  insulin lispro (ADMELOG) corrective regimen sliding scale   SubCutaneous every 6 hours  insulin NPH human recombinant 7 Unit(s) SubCutaneous every 6 hours  metoprolol tartrate 25 milliGRAM(s) Oral two times a day  midodrine 20 milliGRAM(s) Oral every 8 hours  multivitamin/minerals/iron Oral Solution (CENTRUM) 15 milliLiter(s) Oral daily  pantoprazole  Injectable 40 milliGRAM(s) IV Push daily  polyethylene glycol 3350 17 Gram(s) Oral daily  senna 2 Tablet(s) Oral at bedtime  sevelamer carbonate 1600 milliGRAM(s) Oral every 8 hours  thiamine 100 milliGRAM(s) Enteral Tube daily                                    9.4    12.59 )-----------( 196      ( 2023 00:49 )             30.4           132<L>  |  94<L>  |  52<H>  ----------------------------<  145<H>  4.4   |  23  |  4.21<H>    Ca    9.2      2023 00:49  Phos  5.3       Mg     2.8         TPro  7.9  /  Alb  3.6  /  TBili  0.7  /  DBili  x   /  AST  19  /  ALT  12  /  AlkPhos  88                Daily     Daily Weight in k.6 (2023 00:00)      11 @ 07:01  -  12 @ 07:00  --------------------------------------------------------  IN: 280 mL / OUT: 2300 mL / NET: -2020 mL     @ 07: @ 06:12  --------------------------------------------------------  IN: 640 mL / OUT: 225 mL / NET: 415 mL        Critically Ill patient  : [ ] preoperative ,   [x ] post operative    Requires :  [ ] Arterial Line   [ ] Central Line  [ ] PA catheter  [ ] IABP  [ ] ECMO  [ ] LVAD  [ ] Ventilator  [ ] pacemaker [ ] Impella.                      [ x] ABG's     [x ] Pulse Oxymetry Monitoring  Bedside evaluation , monitoring , treatment of hemodynamics , fluids , IVP/ IVCD meds.        Diagnosis:     Cardiogenic Shock / STEMI    Pancreatitis     s/p VA ECMO / IABP - Decannulated     Respiratory Failure  - resolved    Mitral Clip     Tracheostomy - decannulated      Renal Failure - Acute Kidney Injury     Hyponatremia    [ x] Hemodialysis - last HD  [ ] Hemofiltration:    [ x] negative fluid balance [ ] even fluid balance, in a hemodynamically unstable patient.     CHF- acute [ x]   chronic [ ]    systolic [x ]   diastolic [ ]  Valvular [ ]          - Echo- EF -   30%          [ ] RV dysfunction          - Cxr-cardiomegally, edema          - Clinical-  [ ]inotropes   [x]pressors - PO midodrine   [ ]diuresis   [ ]IABP   [ ]ECMO   [ ]LVAD   [ ]Respiratory Failure    Hypotension     Hemodynamic lability,  instability. Requires  [x ] vasopressors- PO Midodrine [ ] inotropes  [ ] vasodilator  [ ]IVSS fluid  to maintain MAP, perfusion, C.I.     Requires bedside physical therapy, mobilization and total FDC care.     candida in Blood x 1        By signing my name below, I, Maria D'Amico, attest that this documentation has been prepared under the direction and in the presence of Finn Zelaya MD.   Electronically Signed: Maria D'Amico, Scribe 23 @ 06:12      Discussed with CT surgeon, Physician Assistant - Nurse Practitioner- Critical care medicine team.   Discussed at  AM / PM rounds.   Chart, labs , films reviewed.    Cumulative Critical Care Time Given Today:  CRITICAL CARE ATTENDING - [facility]    MEDICATIONS  (STANDING):  aMIOdarone    Tablet 200 milliGRAM(s) Oral daily  aspirin  chewable 81 milliGRAM(s) Oral daily  atorvastatin 80 milliGRAM(s) Oral at bedtime  benzonatate 100 milliGRAM(s) Oral every 8 hours  chlorhexidine 2% Cloths 1 Application(s) Topical <User Schedule>  chlorhexidine 4% Liquid 1 Application(s) Topical <User Schedule>  epoetin teena-epbx (RETACRIT) Injectable 5000 Unit(s) IV Push <User Schedule>  heparin   Injectable 5000 Unit(s) SubCutaneous every 8 hours  insulin lispro (ADMELOG) corrective regimen sliding scale   SubCutaneous every 6 hours  insulin NPH human recombinant 7 Unit(s) SubCutaneous every 6 hours  metoprolol tartrate 25 milliGRAM(s) Oral two times a day  midodrine 20 milliGRAM(s) Oral every 8 hours  multivitamin/minerals/iron Oral Solution (CENTRUM) 15 milliLiter(s) Oral daily  pantoprazole  Injectable 40 milliGRAM(s) IV Push daily  polyethylene glycol 3350 17 Gram(s) Oral daily  senna 2 Tablet(s) Oral at bedtime  sevelamer carbonate 1600 milliGRAM(s) Oral every 8 hours  thiamine 100 milliGRAM(s) Enteral Tube daily                                    9.4    12.59 )-----------( 196      ( 2023 00:49 )             30.4           132<L>  |  94<L>  |  52<H>  ----------------------------<  145<H>  4.4   |  23  |  4.21<H>    Ca    9.2      2023 00:49  Phos  5.3       Mg     2.8         TPro  7.9  /  Alb  3.6  /  TBili  0.7  /  DBili  x   /  AST  19  /  ALT  12  /  AlkPhos  88                Daily     Daily Weight in k.6 (2023 00:00)      11 @ 07:01  -  12 @ 07:00  --------------------------------------------------------  IN: 280 mL / OUT: 2300 mL / NET: -2020 mL     @ 07: @ 06:12  --------------------------------------------------------  IN: 640 mL / OUT: 225 mL / NET: 415 mL        Critically Ill patient  : [ ] preoperative ,   [x ] post operative    Requires :  [ ] Arterial Line   [ ] Central Line  [ ] PA catheter  [ ] IABP  [ ] ECMO  [ ] LVAD  [ ] Ventilator  [ ] pacemaker [ ] Impella.                      [ x] ABG's     [x ] Pulse Oxymetry Monitoring  Bedside evaluation , monitoring , treatment of hemodynamics , fluids , IVP/ IVCD meds.        Diagnosis:     Cardiogenic Shock / STEMI    Pancreatitis     s/p VA ECMO / IABP - Decannulated     Respiratory Failure  - resolved    Mitral Clip     Tracheostomy - decannulated      Renal Failure - Acute Kidney Injury     Hyponatremia    [ x] Hemodialysis - last HD  [ ] Hemofiltration:    [ x] negative fluid balance [ ] even fluid balance, in a hemodynamically unstable patient.     CHF- acute [ x]   chronic [ ]    systolic [x ]   diastolic [ ]  Valvular [ ]          - Echo- EF -   30%          [ ] RV dysfunction          - Cxr-cardiomegally, edema          - Clinical-  [ ]inotropes   [x]pressors - PO midodrine   [ ]diuresis   [ ]IABP   [ ]ECMO   [ ]LVAD   [ ]Respiratory Failure    Hypotension     Hemodynamic lability,  instability. Requires  [x ] vasopressors- PO Midodrine [ ] inotropes  [ ] vasodilator  [ ]IVSS fluid  to maintain MAP, perfusion, C.I.     Requires bedside physical therapy, mobilization and total senior care care.     candida in Blood x 1        By signing my name below, I, Maria D'Amico, attest that this documentation has been prepared under the direction and in the presence of Finn Zelaya MD.   Electronically Signed: Maria D'Amico, Scribe 23 @ 06:12      Discussed with CT surgeon, Physician Assistant - Nurse Practitioner- Critical care medicine team.   Discussed at  AM / PM rounds.   Chart, labs , films reviewed.    Cumulative Critical Care Time Given Today:  CRITICAL CARE ATTENDING - [facility]    MEDICATIONS  (STANDING):  aMIOdarone    Tablet 200 milliGRAM(s) Oral daily  aspirin  chewable 81 milliGRAM(s) Oral daily  atorvastatin 80 milliGRAM(s) Oral at bedtime  benzonatate 100 milliGRAM(s) Oral every 8 hours  chlorhexidine 2% Cloths 1 Application(s) Topical <User Schedule>  chlorhexidine 4% Liquid 1 Application(s) Topical <User Schedule>  epoetin teena-epbx (RETACRIT) Injectable 5000 Unit(s) IV Push <User Schedule>  heparin   Injectable 5000 Unit(s) SubCutaneous every 8 hours  insulin lispro (ADMELOG) corrective regimen sliding scale   SubCutaneous every 6 hours  insulin NPH human recombinant 7 Unit(s) SubCutaneous every 6 hours  metoprolol tartrate 25 milliGRAM(s) Oral two times a day  midodrine 20 milliGRAM(s) Oral every 8 hours  multivitamin/minerals/iron Oral Solution (CENTRUM) 15 milliLiter(s) Oral daily  pantoprazole  Injectable 40 milliGRAM(s) IV Push daily  polyethylene glycol 3350 17 Gram(s) Oral daily  senna 2 Tablet(s) Oral at bedtime  sevelamer carbonate 1600 milliGRAM(s) Oral every 8 hours  thiamine 100 milliGRAM(s) Enteral Tube daily                                    9.4    12.59 )-----------( 196      ( 2023 00:49 )             30.4           132<L>  |  94<L>  |  52<H>  ----------------------------<  145<H>  4.4   |  23  |  4.21<H>    Ca    9.2      2023 00:49  Phos  5.3       Mg     2.8         TPro  7.9  /  Alb  3.6  /  TBili  0.7  /  DBili  x   /  AST  19  /  ALT  12  /  AlkPhos  88                Daily     Daily Weight in k.6 (2023 00:00)      11 @ 07:01  -  12 @ 07:00  --------------------------------------------------------  IN: 280 mL / OUT: 2300 mL / NET: -2020 mL     @ 07: @ 06:12  --------------------------------------------------------  IN: 640 mL / OUT: 225 mL / NET: 415 mL        Critically Ill patient  : [ ] preoperative ,   [x ] post operative    Requires :  [ ] Arterial Line   [ ] Central Line  [ ] PA catheter  [ ] IABP  [ ] ECMO  [ ] LVAD  [ ] Ventilator  [ ] pacemaker [ ] Impella.                      [ x] ABG's     [x ] Pulse Oxymetry Monitoring  Bedside evaluation , monitoring , treatment of hemodynamics , fluids , IVP/ IVCD meds.        Diagnosis:     Cardiogenic Shock / STEMI    Pancreatitis     s/p VA ECMO / IABP - Decannulated     Respiratory Failure  - resolved    Mitral Clip     Tracheostomy - decannulated      Renal Failure - Acute Kidney Injury     Hyponatremia    [ x] Hemodialysis - last HD  [ ] Hemofiltration:    [ x] negative fluid balance [ ] even fluid balance, in a hemodynamically unstable patient.     CHF- acute [ x]   chronic [ ]    systolic [x ]   diastolic [ ]  Valvular [ ]          - Echo- EF -   30%          [ ] RV dysfunction          - Cxr-cardiomegally, edema          - Clinical-  [ ]inotropes   [x]pressors - PO midodrine   [ ]diuresis   [ ]IABP   [ ]ECMO   [ ]LVAD   [ ]Respiratory Failure    Hypotension     Hemodynamic lability,  instability. Requires  [x ] vasopressors- PO Midodrine [ ] inotropes  [ ] vasodilator  [ ]IVSS fluid  to maintain MAP, perfusion, C.I.     Requires bedside physical therapy, mobilization and total long term care.     candida in Blood x 1        By signing my name below, I, Maria D'Amico, attest that this documentation has been prepared under the direction and in the presence of Finn Zelaya MD.   Electronically Signed: Maria D'Amico, Scribe 23 @ 06:12      Discussed with CT surgeon, Physician Assistant - Nurse Practitioner- Critical care medicine team.   Discussed at  AM / PM rounds.   Chart, labs , films reviewed.    Cumulative Critical Care Time Given Today:  CRITICAL CARE ATTENDING - [facility]ICU    MEDICATIONS  (STANDING):  aMIOdarone    Tablet 200 milliGRAM(s) Oral daily  aspirin  chewable 81 milliGRAM(s) Oral daily  atorvastatin 80 milliGRAM(s) Oral at bedtime  benzonatate 100 milliGRAM(s) Oral every 8 hours  chlorhexidine 2% Cloths 1 Application(s) Topical <User Schedule>  chlorhexidine 4% Liquid 1 Application(s) Topical <User Schedule>  epoetin teena-epbx (RETACRIT) Injectable 5000 Unit(s) IV Push <User Schedule>  heparin   Injectable 5000 Unit(s) SubCutaneous every 8 hours  insulin lispro (ADMELOG) corrective regimen sliding scale   SubCutaneous every 6 hours  insulin NPH human recombinant 7 Unit(s) SubCutaneous every 6 hours  metoprolol tartrate 25 milliGRAM(s) Oral two times a day  midodrine 20 milliGRAM(s) Oral every 8 hours  multivitamin/minerals/iron Oral Solution (CENTRUM) 15 milliLiter(s) Oral daily  pantoprazole  Injectable 40 milliGRAM(s) IV Push daily  polyethylene glycol 3350 17 Gram(s) Oral daily  senna 2 Tablet(s) Oral at bedtime  sevelamer carbonate 1600 milliGRAM(s) Oral every 8 hours  thiamine 100 milliGRAM(s) Enteral Tube daily                                    9.4    12.59 )-----------( 196      ( 2023 00:49 )             30.4           132<L>  |  94<L>  |  52<H>  ----------------------------<  145<H>  4.4   |  23  |  4.21<H>    Ca    9.2      2023 00:49  Phos  5.3       Mg     2.8         TPro  7.9  /  Alb  3.6  /  TBili  0.7  /  DBili  x   /  AST  19  /  ALT  12  /  AlkPhos  88                Daily     Daily Weight in k.6 (2023 00:00)      11 @ 07:01  -  12 @ 07:00  --------------------------------------------------------  IN: 280 mL / OUT: 2300 mL / NET: -2020 mL     @ 07: @ 06:12  --------------------------------------------------------  IN: 640 mL / OUT: 225 mL / NET: 415 mL        Critically Ill patient  : [ ] preoperative ,   [x ] post operative    Requires :  [ x] Arterial Line   [ x] Central Line  [ ] PA catheter  [ ] IABP  [ ] ECMO  [ ] LVAD  [ ] Ventilator  [ ] pacemaker [ ] Impella.                      [ x] ABG's     [x ] Pulse Oxymetry Monitoring  Bedside evaluation , monitoring , treatment of hemodynamics , fluids , IVP/ IVCD meds.        Diagnosis:     Cardiogenic Shock / STEMI    Pancreatitis     s/p VA ECMO / IABP - Decannulated     Respiratory Failure  - resolved    Mitral Clip     Tracheostomy - decannulated      Renal Failure - Acute Kidney Injury     Hyponatremia    [ x] Hemodialysis -  Today  [ ] Hemofiltration:    [ x] negative fluid balance [ ] even fluid balance, in a hemodynamically unstable patient.     CHF- acute [ x]   chronic [ ]    systolic [x ]   diastolic [ ]  Valvular [ ]          - Echo- EF -   30%          [ ] RV dysfunction          - Cxr-cardiomegally, edema          - Clinical-  [ ]inotropes   [x]pressors - PO midodrine   [x ]diuresis  challenge  [ ]IABP   [ ]ECMO   [ ]LVAD   [ ]Respiratory Failure    Hypotension     Hemodynamic lability,  instability. Requires  [x ] vasopressors- PO Midodrine [ ] inotropes  [ ] vasodilator  [ ]IVSS fluid  to maintain MAP, perfusion, C.I.     Requires bedside physical therapy, mobilization and total correction care.     candida in Blood x 1        By signing my name below, I, Maria D'Amico, attest that this documentation has been prepared under the direction and in the presence of Finn Zelaya MD.   Electronically Signed: Maria D'Amico, Scribe 23 @ 06:12    I, Finn Zelaya, personally performed the services described in this documentation. All medical record entries made by the scribe were at my direction and in my presence. I have reviewed the chart and agree that the record reflects my personal performance and is accurate and complete.   Finn Zelaya MD.       Discussed with CT surgeon, Physician Assistant - Nurse Practitioner- Critical care medicine team.   Discussed at  AM / PM rounds.   Chart, labs , films reviewed.    Cumulative Critical Care Time Given Today:  30 min CRITICAL CARE ATTENDING - CTICU    MEDICATIONS  (STANDING):  aMIOdarone    Tablet 200 milliGRAM(s) Oral daily  aspirin  chewable 81 milliGRAM(s) Oral daily  atorvastatin 80 milliGRAM(s) Oral at bedtime  benzonatate 100 milliGRAM(s) Oral every 8 hours  chlorhexidine 2% Cloths 1 Application(s) Topical <User Schedule>  chlorhexidine 4% Liquid 1 Application(s) Topical <User Schedule>  epoetin teena-epbx (RETACRIT) Injectable 5000 Unit(s) IV Push <User Schedule>  heparin   Injectable 5000 Unit(s) SubCutaneous every 8 hours  insulin lispro (ADMELOG) corrective regimen sliding scale   SubCutaneous every 6 hours  insulin NPH human recombinant 7 Unit(s) SubCutaneous every 6 hours  metoprolol tartrate 25 milliGRAM(s) Oral two times a day  midodrine 20 milliGRAM(s) Oral every 8 hours  multivitamin/minerals/iron Oral Solution (CENTRUM) 15 milliLiter(s) Oral daily  pantoprazole  Injectable 40 milliGRAM(s) IV Push daily  polyethylene glycol 3350 17 Gram(s) Oral daily  senna 2 Tablet(s) Oral at bedtime  sevelamer carbonate 1600 milliGRAM(s) Oral every 8 hours  thiamine 100 milliGRAM(s) Enteral Tube daily                                    9.4    12.59 )-----------( 196      ( 2023 00:49 )             30.4           132<L>  |  94<L>  |  52<H>  ----------------------------<  145<H>  4.4   |  23  |  4.21<H>    Ca    9.2      2023 00:49  Phos  5.3       Mg     2.8         TPro  7.9  /  Alb  3.6  /  TBili  0.7  /  DBili  x   /  AST  19  /  ALT  12  /  AlkPhos  88                Daily     Daily Weight in k.6 (2023 00:00)      11 @ 07:01  -  12 @ 07:00  --------------------------------------------------------  IN: 280 mL / OUT: 2300 mL / NET: -2020 mL     @ 07: @ 06:12  --------------------------------------------------------  IN: 640 mL / OUT: 225 mL / NET: 415 mL        Critically Ill patient  : [ ] preoperative ,   [x ] post operative    Requires :  [ x] Arterial Line   [ x] Central Line  [ ] PA catheter  [ ] IABP  [ ] ECMO  [ ] LVAD  [ ] Ventilator  [ ] pacemaker [ ] Impella.                      [ x] ABG's     [x ] Pulse Oxymetry Monitoring  Bedside evaluation , monitoring , treatment of hemodynamics , fluids , IVP/ IVCD meds.        Diagnosis:     Cardiogenic Shock / STEMI    Pancreatitis     s/p VA ECMO / IABP - Decannulated     Respiratory Failure  - resolved    Mitral Clip     Tracheostomy - decannulated      Renal Failure - Acute Kidney Injury     Hyponatremia    [ x] Hemodialysis -  Today  [ ] Hemofiltration:    [ x] negative fluid balance [ ] even fluid balance, in a hemodynamically unstable patient.     CHF- acute [ x]   chronic [ ]    systolic [x ]   diastolic [ ]  Valvular [ ]          - Echo- EF -   30%          [ ] RV dysfunction          - Cxr-cardiomegally, edema          - Clinical-  [ ]inotropes   [x]pressors - PO midodrine   [x ]diuresis  challenge  [ ]IABP   [ ]ECMO   [ ]LVAD   [ ]Respiratory Failure    Hypotension     Hemodynamic lability,  instability. Requires  [x ] vasopressors- PO Midodrine [ ] inotropes  [ ] vasodilator  [ ]IVSS fluid  to maintain MAP, perfusion, C.I.     Requires bedside physical therapy, mobilization and total [facility] care.     candida in Blood x 1        By signing my name below, I, Maria D'Amico, attest that this documentation has been prepared under the direction and in the presence of Finn Zelaya MD.   Electronically Signed: Maria D'Amico, Scribe 23 @ 06:12    I, Finn Zelaya, personally performed the services described in this documentation. All medical record entries made by the scribe were at my direction and in my presence. I have reviewed the chart and agree that the record reflects my personal performance and is accurate and complete.   Finn Zelaya MD.       Discussed with CT surgeon, Physician Assistant - Nurse Practitioner- Critical care medicine team.   Discussed at  AM / PM rounds.   Chart, labs , films reviewed.    Cumulative Critical Care Time Given Today:  30 min CRITICAL CARE ATTENDING - CTICU    MEDICATIONS  (STANDING):  aMIOdarone    Tablet 200 milliGRAM(s) Oral daily  aspirin  chewable 81 milliGRAM(s) Oral daily  atorvastatin 80 milliGRAM(s) Oral at bedtime  benzonatate 100 milliGRAM(s) Oral every 8 hours  chlorhexidine 2% Cloths 1 Application(s) Topical <User Schedule>  chlorhexidine 4% Liquid 1 Application(s) Topical <User Schedule>  epoetin teena-epbx (RETACRIT) Injectable 5000 Unit(s) IV Push <User Schedule>  heparin   Injectable 5000 Unit(s) SubCutaneous every 8 hours  insulin lispro (ADMELOG) corrective regimen sliding scale   SubCutaneous every 6 hours  insulin NPH human recombinant 7 Unit(s) SubCutaneous every 6 hours  metoprolol tartrate 25 milliGRAM(s) Oral two times a day  midodrine 20 milliGRAM(s) Oral every 8 hours  multivitamin/minerals/iron Oral Solution (CENTRUM) 15 milliLiter(s) Oral daily  pantoprazole  Injectable 40 milliGRAM(s) IV Push daily  polyethylene glycol 3350 17 Gram(s) Oral daily  senna 2 Tablet(s) Oral at bedtime  sevelamer carbonate 1600 milliGRAM(s) Oral every 8 hours  thiamine 100 milliGRAM(s) Enteral Tube daily                                    9.4    12.59 )-----------( 196      ( 2023 00:49 )             30.4           132<L>  |  94<L>  |  52<H>  ----------------------------<  145<H>  4.4   |  23  |  4.21<H>    Ca    9.2      2023 00:49  Phos  5.3       Mg     2.8         TPro  7.9  /  Alb  3.6  /  TBili  0.7  /  DBili  x   /  AST  19  /  ALT  12  /  AlkPhos  88                Daily     Daily Weight in k.6 (2023 00:00)      11 @ 07:01  -  12 @ 07:00  --------------------------------------------------------  IN: 280 mL / OUT: 2300 mL / NET: -2020 mL     @ 07: @ 06:12  --------------------------------------------------------  IN: 640 mL / OUT: 225 mL / NET: 415 mL        Critically Ill patient  : [ ] preoperative ,   [x ] post operative    Requires :  [ x] Arterial Line   [ x] Central Line  [ ] PA catheter  [ ] IABP  [ ] ECMO  [ ] LVAD  [ ] Ventilator  [ ] pacemaker [ ] Impella.                      [ x] ABG's     [x ] Pulse Oxymetry Monitoring  Bedside evaluation , monitoring , treatment of hemodynamics , fluids , IVP/ IVCD meds.        Diagnosis:     Cardiogenic Shock / STEMI    Pancreatitis     s/p VA ECMO / IABP - Decannulated     Respiratory Failure  - resolved    Mitral Clip     Tracheostomy - decannulated      Renal Failure - Acute Kidney Injury     Hyponatremia    [ x] Hemodialysis -  Today  [ ] Hemofiltration:    [ x] negative fluid balance [ ] even fluid balance, in a hemodynamically unstable patient.     CHF- acute [ x]   chronic [ ]    systolic [x ]   diastolic [ ]  Valvular [ ]          - Echo- EF -   30%          [ ] RV dysfunction          - Cxr-cardiomegally, edema          - Clinical-  [ ]inotropes   [x]pressors - PO midodrine   [x ]diuresis  challenge  [ ]IABP   [ ]ECMO   [ ]LVAD   [ ]Respiratory Failure    Hypotension     Hemodynamic lability,  instability. Requires  [x ] vasopressors- PO Midodrine [ ] inotropes  [ ] vasodilator  [ ]IVSS fluid  to maintain MAP, perfusion, C.I.     Requires bedside physical therapy, mobilization and total detention care.     candida in Blood x 1        By signing my name below, I, Maria D'Amico, attest that this documentation has been prepared under the direction and in the presence of Finn Zelaya MD.   Electronically Signed: Maria D'Amico, Scribe 23 @ 06:12    I, Finn Zelaya, personally performed the services described in this documentation. All medical record entries made by the scribe were at my direction and in my presence. I have reviewed the chart and agree that the record reflects my personal performance and is accurate and complete.   Finn Zelaya MD.       Discussed with CT surgeon, Physician Assistant - Nurse Practitioner- Critical care medicine team.   Discussed at  AM / PM rounds.   Chart, labs , films reviewed.    Cumulative Critical Care Time Given Today:  30 min

## 2023-01-14 DIAGNOSIS — Z98.890 OTHER SPECIFIED POSTPROCEDURAL STATES: ICD-10-CM

## 2023-01-14 LAB
ALBUMIN SERPL ELPH-MCNC: 3.6 G/DL — SIGNIFICANT CHANGE UP (ref 3.3–5)
ALP SERPL-CCNC: 93 U/L — SIGNIFICANT CHANGE UP (ref 40–120)
ALT FLD-CCNC: 8 U/L — LOW (ref 10–45)
ANION GAP SERPL CALC-SCNC: 15 MMOL/L — SIGNIFICANT CHANGE UP (ref 5–17)
AST SERPL-CCNC: 18 U/L — SIGNIFICANT CHANGE UP (ref 10–40)
BILIRUB SERPL-MCNC: 0.7 MG/DL — SIGNIFICANT CHANGE UP (ref 0.2–1.2)
BUN SERPL-MCNC: 33 MG/DL — HIGH (ref 7–23)
CALCIUM SERPL-MCNC: 8.9 MG/DL — SIGNIFICANT CHANGE UP (ref 8.4–10.5)
CHLORIDE SERPL-SCNC: 91 MMOL/L — LOW (ref 96–108)
CO2 SERPL-SCNC: 27 MMOL/L — SIGNIFICANT CHANGE UP (ref 22–31)
CREAT SERPL-MCNC: 3.72 MG/DL — HIGH (ref 0.5–1.3)
CULTURE RESULTS: SIGNIFICANT CHANGE UP
EGFR: 18 ML/MIN/1.73M2 — LOW
GLUCOSE BLDC GLUCOMTR-MCNC: 105 MG/DL — HIGH (ref 70–99)
GLUCOSE BLDC GLUCOMTR-MCNC: 133 MG/DL — HIGH (ref 70–99)
GLUCOSE BLDC GLUCOMTR-MCNC: 176 MG/DL — HIGH (ref 70–99)
GLUCOSE BLDC GLUCOMTR-MCNC: 200 MG/DL — HIGH (ref 70–99)
GLUCOSE SERPL-MCNC: 122 MG/DL — HIGH (ref 70–99)
HCT VFR BLD CALC: 32.4 % — LOW (ref 39–50)
HGB BLD-MCNC: 9.8 G/DL — LOW (ref 13–17)
MAGNESIUM SERPL-MCNC: 3.2 MG/DL — HIGH (ref 1.6–2.6)
MCHC RBC-ENTMCNC: 30 PG — SIGNIFICANT CHANGE UP (ref 27–34)
MCHC RBC-ENTMCNC: 30.2 GM/DL — LOW (ref 32–36)
MCV RBC AUTO: 99.1 FL — SIGNIFICANT CHANGE UP (ref 80–100)
NRBC # BLD: 0 /100 WBCS — SIGNIFICANT CHANGE UP (ref 0–0)
PHOSPHATE SERPL-MCNC: 4.1 MG/DL — SIGNIFICANT CHANGE UP (ref 2.5–4.5)
PLATELET # BLD AUTO: 176 K/UL — SIGNIFICANT CHANGE UP (ref 150–400)
POTASSIUM SERPL-MCNC: 3.8 MMOL/L — SIGNIFICANT CHANGE UP (ref 3.5–5.3)
POTASSIUM SERPL-SCNC: 3.8 MMOL/L — SIGNIFICANT CHANGE UP (ref 3.5–5.3)
PROT SERPL-MCNC: 7.8 G/DL — SIGNIFICANT CHANGE UP (ref 6–8.3)
RBC # BLD: 3.27 M/UL — LOW (ref 4.2–5.8)
RBC # FLD: 19.3 % — HIGH (ref 10.3–14.5)
SODIUM SERPL-SCNC: 133 MMOL/L — LOW (ref 135–145)
SPECIMEN SOURCE: SIGNIFICANT CHANGE UP
WBC # BLD: 11.02 K/UL — HIGH (ref 3.8–10.5)
WBC # FLD AUTO: 11.02 K/UL — HIGH (ref 3.8–10.5)

## 2023-01-14 PROCEDURE — 99232 SBSQ HOSP IP/OBS MODERATE 35: CPT | Mod: 24

## 2023-01-14 PROCEDURE — 99233 SBSQ HOSP IP/OBS HIGH 50: CPT | Mod: GC

## 2023-01-14 PROCEDURE — 71045 X-RAY EXAM CHEST 1 VIEW: CPT | Mod: 26

## 2023-01-14 RX ORDER — PANTOPRAZOLE SODIUM 20 MG/1
40 TABLET, DELAYED RELEASE ORAL
Refills: 0 | Status: DISCONTINUED | OUTPATIENT
Start: 2023-01-14 | End: 2023-01-20

## 2023-01-14 RX ORDER — INSULIN LISPRO 100/ML
VIAL (ML) SUBCUTANEOUS AT BEDTIME
Refills: 0 | Status: DISCONTINUED | OUTPATIENT
Start: 2023-01-14 | End: 2023-01-20

## 2023-01-14 RX ORDER — INSULIN LISPRO 100/ML
VIAL (ML) SUBCUTANEOUS
Refills: 0 | Status: DISCONTINUED | OUTPATIENT
Start: 2023-01-14 | End: 2023-01-20

## 2023-01-14 RX ORDER — GLUCAGON INJECTION, SOLUTION 0.5 MG/.1ML
1 INJECTION, SOLUTION SUBCUTANEOUS ONCE
Refills: 0 | Status: DISCONTINUED | OUTPATIENT
Start: 2023-01-14 | End: 2023-01-20

## 2023-01-14 RX ORDER — MAGNESIUM SULFATE 500 MG/ML
2 VIAL (ML) INJECTION ONCE
Refills: 0 | Status: COMPLETED | OUTPATIENT
Start: 2023-01-14 | End: 2023-01-14

## 2023-01-14 RX ORDER — DEXTROSE 50 % IN WATER 50 %
25 SYRINGE (ML) INTRAVENOUS ONCE
Refills: 0 | Status: DISCONTINUED | OUTPATIENT
Start: 2023-01-14 | End: 2023-01-20

## 2023-01-14 RX ORDER — INSULIN GLARGINE 100 [IU]/ML
18 INJECTION, SOLUTION SUBCUTANEOUS AT BEDTIME
Refills: 0 | Status: DISCONTINUED | OUTPATIENT
Start: 2023-01-14 | End: 2023-01-18

## 2023-01-14 RX ORDER — DEXTROSE 50 % IN WATER 50 %
15 SYRINGE (ML) INTRAVENOUS ONCE
Refills: 0 | Status: DISCONTINUED | OUTPATIENT
Start: 2023-01-14 | End: 2023-01-20

## 2023-01-14 RX ORDER — SEVELAMER CARBONATE 2400 MG/1
1600 POWDER, FOR SUSPENSION ORAL EVERY 8 HOURS
Refills: 0 | Status: DISCONTINUED | OUTPATIENT
Start: 2023-01-14 | End: 2023-01-14

## 2023-01-14 RX ORDER — DEXTROSE 50 % IN WATER 50 %
12.5 SYRINGE (ML) INTRAVENOUS ONCE
Refills: 0 | Status: DISCONTINUED | OUTPATIENT
Start: 2023-01-14 | End: 2023-01-20

## 2023-01-14 RX ORDER — ONDANSETRON 8 MG/1
4 TABLET, FILM COATED ORAL ONCE
Refills: 0 | Status: COMPLETED | OUTPATIENT
Start: 2023-01-14 | End: 2023-01-14

## 2023-01-14 RX ORDER — AMIODARONE HYDROCHLORIDE 400 MG/1
200 TABLET ORAL DAILY
Refills: 0 | Status: DISCONTINUED | OUTPATIENT
Start: 2023-01-15 | End: 2023-01-16

## 2023-01-14 RX ORDER — CASPOFUNGIN ACETATE 7 MG/ML
50 INJECTION, POWDER, LYOPHILIZED, FOR SOLUTION INTRAVENOUS EVERY 24 HOURS
Refills: 0 | Status: DISCONTINUED | OUTPATIENT
Start: 2023-01-14 | End: 2023-01-20

## 2023-01-14 RX ORDER — INSULIN LISPRO 100/ML
6 VIAL (ML) SUBCUTANEOUS
Refills: 0 | Status: DISCONTINUED | OUTPATIENT
Start: 2023-01-14 | End: 2023-01-18

## 2023-01-14 RX ORDER — SEVELAMER CARBONATE 2400 MG/1
1600 POWDER, FOR SUSPENSION ORAL
Refills: 0 | Status: DISCONTINUED | OUTPATIENT
Start: 2023-01-14 | End: 2023-01-19

## 2023-01-14 RX ADMIN — Medication 6 UNIT(S): at 17:47

## 2023-01-14 RX ADMIN — Medication 650 MILLIGRAM(S): at 01:50

## 2023-01-14 RX ADMIN — MIDODRINE HYDROCHLORIDE 20 MILLIGRAM(S): 2.5 TABLET ORAL at 09:25

## 2023-01-14 RX ADMIN — MIDODRINE HYDROCHLORIDE 20 MILLIGRAM(S): 2.5 TABLET ORAL at 13:17

## 2023-01-14 RX ADMIN — ONDANSETRON 4 MILLIGRAM(S): 8 TABLET, FILM COATED ORAL at 08:40

## 2023-01-14 RX ADMIN — SEVELAMER CARBONATE 1600 MILLIGRAM(S): 2400 POWDER, FOR SUSPENSION ORAL at 17:48

## 2023-01-14 RX ADMIN — SENNA PLUS 2 TABLET(S): 8.6 TABLET ORAL at 21:17

## 2023-01-14 RX ADMIN — Medication 100 MILLIGRAM(S): at 21:17

## 2023-01-14 RX ADMIN — Medication 100 MILLIGRAM(S): at 13:13

## 2023-01-14 RX ADMIN — Medication 650 MILLIGRAM(S): at 00:43

## 2023-01-14 RX ADMIN — MIDODRINE HYDROCHLORIDE 20 MILLIGRAM(S): 2.5 TABLET ORAL at 21:17

## 2023-01-14 RX ADMIN — Medication 81 MILLIGRAM(S): at 13:13

## 2023-01-14 RX ADMIN — Medication 15 MILLILITER(S): at 13:13

## 2023-01-14 RX ADMIN — HEPARIN SODIUM 5000 UNIT(S): 5000 INJECTION INTRAVENOUS; SUBCUTANEOUS at 09:26

## 2023-01-14 RX ADMIN — Medication 25 MILLIGRAM(S): at 11:37

## 2023-01-14 RX ADMIN — SEVELAMER CARBONATE 1600 MILLIGRAM(S): 2400 POWDER, FOR SUSPENSION ORAL at 09:25

## 2023-01-14 RX ADMIN — Medication 3: at 11:36

## 2023-01-14 RX ADMIN — Medication 200 MILLIGRAM(S): at 00:38

## 2023-01-14 RX ADMIN — INSULIN GLARGINE 18 UNIT(S): 100 INJECTION, SOLUTION SUBCUTANEOUS at 21:29

## 2023-01-14 RX ADMIN — HEPARIN SODIUM 5000 UNIT(S): 5000 INJECTION INTRAVENOUS; SUBCUTANEOUS at 17:48

## 2023-01-14 RX ADMIN — HUMAN INSULIN 7 UNIT(S): 100 INJECTION, SUSPENSION SUBCUTANEOUS at 11:36

## 2023-01-14 RX ADMIN — ATORVASTATIN CALCIUM 80 MILLIGRAM(S): 80 TABLET, FILM COATED ORAL at 21:17

## 2023-01-14 RX ADMIN — Medication 50 GRAM(S): at 03:07

## 2023-01-14 RX ADMIN — POLYETHYLENE GLYCOL 3350 17 GRAM(S): 17 POWDER, FOR SOLUTION ORAL at 13:14

## 2023-01-14 RX ADMIN — CHLORHEXIDINE GLUCONATE 1 APPLICATION(S): 213 SOLUTION TOPICAL at 06:37

## 2023-01-14 RX ADMIN — CASPOFUNGIN ACETATE 260 MILLIGRAM(S): 7 INJECTION, POWDER, LYOPHILIZED, FOR SOLUTION INTRAVENOUS at 12:20

## 2023-01-14 RX ADMIN — AMIODARONE HYDROCHLORIDE 200 MILLIGRAM(S): 400 TABLET ORAL at 09:31

## 2023-01-14 RX ADMIN — Medication 1: at 17:47

## 2023-01-14 RX ADMIN — Medication 100 MILLIGRAM(S): at 09:26

## 2023-01-14 RX ADMIN — SEVELAMER CARBONATE 1600 MILLIGRAM(S): 2400 POWDER, FOR SUSPENSION ORAL at 13:12

## 2023-01-14 RX ADMIN — HUMAN INSULIN 7 UNIT(S): 100 INJECTION, SUSPENSION SUBCUTANEOUS at 06:03

## 2023-01-14 RX ADMIN — HEPARIN SODIUM 5000 UNIT(S): 5000 INJECTION INTRAVENOUS; SUBCUTANEOUS at 00:39

## 2023-01-14 NOTE — PROGRESS NOTE ADULT - PROBLEM SELECTOR PLAN 1
Pt with ERIC/ ATN in the setting of STEMI, cardiac arrest & cardiogenic shock. SCr on arrival was 2.15; trended down to hector 1.6 on 11/25; slowly trended up & increased to 3.95 11/28. Pt initiated on CRRT/CVVHDF to optimize volume and electrolytes on 12/5/22. Pt likely with ATN. Pt underwent decannulation of ECMO on 12/8/22 and was taken off CRRT.   Pt reinitiated on CRRT overnight on 12/9/22 for volume overload. CRRT stopped again 12/19 & initiated on iHD.    s/p RIJ tunneled HD cath on 1/11. Last HD session 1/13 with [facility]. Labs reviewed. Pt clinically stable without evidence of volume overload. No plan for HD today, will assess daily for HD needs. Continue to monitor for renal recovery. Monitor labs and urine output. Avoid any potential nephrotoxins. Dose medications as per HD.    If any questions, please feel free to contact me     Shiraz Steven  Nephrology Fellow  [facility] Pager: [phone] Pt with ERIC/ ATN in the setting of STEMI, cardiac arrest & cardiogenic shock. SCr on arrival was 2.15; trended down to hector 1.6 on 11/25; slowly trended up & increased to 3.95 11/28. Pt initiated on CRRT/CVVHDF to optimize volume and electrolytes on 12/5/22. Pt likely with ATN. Pt underwent decannulation of ECMO on 12/8/22 and was taken off CRRT.   Pt reinitiated on CRRT overnight on 12/9/22 for volume overload. CRRT stopped again 12/19 & initiated on iHD.    s/p RIJ tunneled HD cath on 1/11. Last HD session 1/13 with U[facility]. Labs reviewed. Pt clinically stable without evidence of volume overload. No plan for HD today, will assess daily for HD needs. Continue to monitor for renal recovery. Monitor labs and urine output. Avoid any potential nephrotoxins. Dose medications as per HD.    If any questions, please feel free to contact me     Shiraz Steven  Nephrology Fellow  [facility] Pager: [phone] Pt with ERIC/ ATN in the setting of STEMI, cardiac arrest & cardiogenic shock. SCr on arrival was 2.15; trended down to hector 1.6 on 11/25; slowly trended up & increased to 3.95 11/28. Pt initiated on CRRT/CVVHDF to optimize volume and electrolytes on 12/5/22. Pt likely with ATN. Pt underwent decannulation of ECMO on 12/8/22 and was taken off CRRT.   Pt reinitiated on CRRT overnight on 12/9/22 for volume overload. CRRT stopped again 12/19 & initiated on iHD.    s/p RIJ tunneled HD cath on 1/11. Last HD session 1/13 with U[facility]. Labs reviewed. Pt clinically stable without evidence of volume overload. No plan for HD today, will assess daily for HD needs. Continue to monitor for renal recovery. Monitor labs and urine output. Avoid any potential nephrotoxins. Dose medications as per HD.    If any questions, please feel free to contact me     Shiraz Steven  Nephrology Fellow  [facility] Pager: [phone]

## 2023-01-14 NOTE — PROGRESS NOTE ADULT - SUBJECTIVE AND OBJECTIVE BOX
VITAL SIGNS    Telemetry:    Vital Signs Last 24 Hrs  T(C): 36.9 (23 @ 07:00), Max: 36.9 (23 @ 23:30)  T(F): 98.5 (23 @ 07:00), Max: 98.5 (23 @ 07:00)  HR: 85 (23 @ 07:00) (79 - 94)  BP: 108/59 (23 @ 07:00) (91/51 - 118/67)  RR: 18 (23 @ 07:00) (11 - 26)  SpO2: 100% (23 @ 07:00) (94% - 100%)             @ 07:  -   @ 07:00  --------------------------------------------------------  IN: 400 mL / OUT: 1900 mL / NET: -1500 mL     @ 07:01  -   @ 09:57  --------------------------------------------------------  IN: 240 mL / OUT: 0 mL / NET: 240 mL       Daily     Daily Weight in k.7 (2023 08:45)  Admit Wt: Drug Dosing Weight  Height (cm): 172.7 (2023 17:58)  Weight (kg): 92.9 (2023 17:58)  BMI (kg/m2): 31.1 (2023 17:58)  BSA (m2): 2.06 (2023 17:58)    Bilirubin Total, Serum: 0.7 mg/dL ( @ 07:36)    CAPILLARY BLOOD GLUCOSE      POCT Blood Glucose.: 133 mg/dL (2023 05:57)  POCT Blood Glucose.: 109 mg/dL (2023 22:44)  POCT Blood Glucose.: 87 mg/dL (2023 16:36)  POCT Blood Glucose.: 143 mg/dL (2023 12:29)  POCT Blood Glucose.: 152 mg/dL (2023 10:22)          acetaminophen    Suspension .. 650 milliGRAM(s) Oral every 6 hours PRN  aspirin  chewable 81 milliGRAM(s) Oral daily  atorvastatin 80 milliGRAM(s) Oral at bedtime  benzonatate 100 milliGRAM(s) Oral every 8 hours  caspofungin IVPB 50 milliGRAM(s) IV Intermittent every 24 hours  chlorhexidine 2% Cloths 1 Application(s) Topical <User Schedule>  chlorhexidine 4% Liquid 1 Application(s) Topical <User Schedule>  epoetin teena-epbx (RETACRIT) Injectable 5000 Unit(s) IV Push <User Schedule>  guaiFENesin Oral Liquid (Sugar-Free) 200 milliGRAM(s) Oral every 6 hours PRN  heparin   Injectable 5000 Unit(s) SubCutaneous every 8 hours  hydrocodone/homatropine Syrup 5 milliLiter(s) Oral every 8 hours PRN  insulin lispro (ADMELOG) corrective regimen sliding scale   SubCutaneous every 6 hours  insulin NPH human recombinant 7 Unit(s) SubCutaneous every 6 hours  metoprolol tartrate 25 milliGRAM(s) Oral two times a day  midodrine 20 milliGRAM(s) Oral every 8 hours  multivitamin/minerals/iron Oral Solution (CENTRUM) 15 milliLiter(s) Oral daily  pantoprazole    Tablet 40 milliGRAM(s) Oral before breakfast  polyethylene glycol 3350 17 Gram(s) Oral daily  senna 2 Tablet(s) Oral at bedtime  sevelamer carbonate 1600 milliGRAM(s) Oral every 8 hours  simethicone 80 milliGRAM(s) Chew daily PRN  sodium chloride 0.9% lock flush 10 milliLiter(s) IV Push every 1 hour PRN  thiamine 100 milliGRAM(s) Enteral Tube daily      PHYSICAL EXAM    Subjective: "Hi.   Neurology: alert and oriented x 3, nonfocal, no gross deficits  CV : tele:  RSR  Sternal Wound :  CDI with dressing , Stable  Lungs: clear. RR easy, unlabored   Abdomen: soft, nontender, nondistended, positive bowel sounds, bowel movement   Neg N/V/D   :  pt voiding without difficulty   Extremities:   COBB; edema, neg calf tenderness.   PPP bilaterally      PW:  Chest tubes:                 VITAL SIGNS    Telemetry:  rsr    Vital Signs Last 24 Hrs  T(C): 36.9 (23 @ 07:00), Max: 36.9 (23 @ 23:30)  T(F): 98.5 (23 @ 07:00), Max: 98.5 (23 @ 07:00)  HR: 85 (23 @ 07:00) (79 - 94)  BP: 108/59 (23 @ 07:00) (91/51 - 118/67)  RR: 18 (23 @ 07:00) (11 - 26)  SpO2: 100% (23 @ 07:00) (94% - 100%)             07:  -   @ 07:00  --------------------------------------------------------  IN: 400 mL / OUT: 1900 mL / NET: -1500 mL     @ 07:01  -   @ 09:57  --------------------------------------------------------  IN: 240 mL / OUT: 0 mL / NET: 240 mL       Daily     Daily Weight in k.7 (2023 08:45)  Admit Wt: Drug Dosing Weight  Height (cm): 172.7 (2023 17:58)  Weight (kg): 92.9 (2023 17:58)  BMI (kg/m2): 31.1 (2023 17:58)  BSA (m2): 2.06 (2023 17:58)    Bilirubin Total, Serum: 0.7 mg/dL ( @ 07:36)    CAPILLARY BLOOD GLUCOSE      POCT Blood Glucose.: 133 mg/dL (2023 05:57)  POCT Blood Glucose.: 109 mg/dL (2023 22:44)  POCT Blood Glucose.: 87 mg/dL (2023 16:36)  POCT Blood Glucose.: 143 mg/dL (2023 12:29)  POCT Blood Glucose.: 152 mg/dL (2023 10:22)          acetaminophen    Suspension .. 650 milliGRAM(s) Oral every 6 hours PRN  aspirin  chewable 81 milliGRAM(s) Oral daily  atorvastatin 80 milliGRAM(s) Oral at bedtime  benzonatate 100 milliGRAM(s) Oral every 8 hours  caspofungin IVPB 50 milliGRAM(s) IV Intermittent every 24 hours  chlorhexidine 2% Cloths 1 Application(s) Topical <User Schedule>  chlorhexidine 4% Liquid 1 Application(s) Topical <User Schedule>  epoetin teena-epbx (RETACRIT) Injectable 5000 Unit(s) IV Push <User Schedule>  guaiFENesin Oral Liquid (Sugar-Free) 200 milliGRAM(s) Oral every 6 hours PRN  heparin   Injectable 5000 Unit(s) SubCutaneous every 8 hours  hydrocodone/homatropine Syrup 5 milliLiter(s) Oral every 8 hours PRN  insulin lispro (ADMELOG) corrective regimen sliding scale   SubCutaneous every 6 hours  insulin NPH human recombinant 7 Unit(s) SubCutaneous every 6 hours  metoprolol tartrate 25 milliGRAM(s) Oral two times a day  midodrine 20 milliGRAM(s) Oral every 8 hours  multivitamin/minerals/iron Oral Solution (CENTRUM) 15 milliLiter(s) Oral daily  pantoprazole    Tablet 40 milliGRAM(s) Oral before breakfast  polyethylene glycol 3350 17 Gram(s) Oral daily  senna 2 Tablet(s) Oral at bedtime  sevelamer carbonate 1600 milliGRAM(s) Oral every 8 hours  simethicone 80 milliGRAM(s) Chew daily PRN  sodium chloride 0.9% lock flush 10 milliLiter(s) IV Push every 1 hour PRN  thiamine 100 milliGRAM(s) Enteral Tube daily      PHYSICAL EXAM    Subjective: "I had nausea and vomited once overnight."   Neurology: alert and oriented x 3, nonfocal, no gross deficits  CV : tele:  RSR    rt permacath cdi w/ dressing   Lungs: clear. RR easy, unlabored; former trach site cdi w/ dressing   Abdomen: soft, nontender, nondistended, positive bowel sounds, + bowel movement     :  HD patient   Extremities:   COBB; neg LE edema, neg calf tenderness.  PPP bilaterally; rt groin vac intact

## 2023-01-14 NOTE — PROGRESS NOTE ADULT - PROBLEM SELECTOR PLAN 8
tx sdu 1/13  continue asa/ statin/ bb and amio 200 qd  HD as per renal  vac changes to rt groin MMF  continue capsofungin as per DR. Rodrigues  nutritional optimization; ck prealbumin in am   increase activity as tolerated  pulm toilet  discharge planning- acute rehab

## 2023-01-14 NOTE — CONSULT NOTE ADULT - ASSESSMENT
Assessment  DMT2: 62y Male with DM T2 with hyperglycemia admitted with CAD, patient was on NPH and insulin coverage, blood sugars improving, eating meals.  Patient is high risk with high level decision making due to uncontrolled diabetes, has increased risk for complications. Tight sugar control is not recommended for this patient.  CAD: S/P CABG, on medications, monitored, asymptomatic.  HTN: On antihypertensive medications, monitored, asymptomatic.              Nayeli Quiles MD  Cell:  [phone]  Office: [phone]               Assessment  DMT2: 62y Male with DM T2 with hyperglycemia admitted with CAD, patient was on NPH and insulin coverage, blood sugars improving, eating meals.  Patient is high risk with high level decision making due to uncontrolled diabetes, has increased risk for complications. Tight sugar control is not recommended for this patient.  CAD: S/P CABG, on medications, monitored, asymptomatic.  HTN: On antihypertensive medications, monitored, asymptomatic.              Nayeli Quiles MD  Cell:  [phone]  Office: [phone]               Assessment  DMT2: 62y Male with DM T2 with hyperglycemia admitted with CAD, patient was on NPH and insulin coverage, blood sugars improving, eating meals.  Patient is high risk with high level decision making due to uncontrolled diabetes, has increased risk for complications. Tight sugar control is not recommended for this patient.  CAD: S/P CABG, on medications, monitored, asymptomatic.  HTN: On antihypertensive medications, monitored, asymptomatic.              Nayeli Quiles MD  Cell:  [phone]  Office: [phone]               Assessment  DMT2: 62y Male with DM T2 with hyperglycemia admitted with CAD, patient was on NPH and insulin coverage, blood sugars improving, eating meals.  Patient is high risk with high level decision making due to uncontrolled diabetes, has increased risk for complications.   CAD: S/P CABG, on medications, monitored, asymptomatic.  HTN: On antihypertensive medications, monitored, asymptomatic.              Nayeli Quiles MD  Cell:  [phone]  Office: [phone]               Assessment  DMT2: 62y Male with DM T2 with hyperglycemia admitted with CAD, patient was on NPH and insulin coverage, blood sugars improving, eating meals.  Patient is high risk with high level decision making due to uncontrolled diabetes, has increased risk for complications.   CAD: S/P CABG, on medications, monitored, asymptomatic.  HTN: On antihypertensive medications, monitored, asymptomatic.              Nayeli Quiles MD  Cell:  [phone]  Office: [phone]               Assessment  DMT2: 62y Male with DM T2 with hyperglycemia admitted with CAD, patient was on NPH and insulin coverage, blood sugars improving, eating meals.  Patient is high risk with high level decision making due to uncontrolled diabetes, has increased risk for complications.   CAD: S/P CABG, on medications, monitored, asymptomatic.  HTN: On antihypertensive medications, monitored, asymptomatic.              Nayeli Quiles MD  Cell:  [phone]  Office: [phone]

## 2023-01-14 NOTE — CONSULT NOTE ADULT - PROBLEM SELECTOR PROBLEM 2
Gall stone pancreatitis
Non-ST elevation MI (NSTEMI)
CAD (coronary artery disease)
Palliative care encounter

## 2023-01-14 NOTE — PROGRESS NOTE ADULT - PROBLEM SELECTOR PLAN 5
- likely arrest associated ATN, hypovolemia; nonoliguric   - was on CVVH now on iHD   - appreciate nephrology recommendations   - avoid nephrotoxins  - s/p CARMELO permacath 1/11 - likely arrest associated ATN, hypovolemia; nonoliguric   - was on CVVH now on iHD   - appreciate nephrology recommendations   - avoid nephrotoxins  - s/p RIJ permacath 1/11  - vasc sx called for AV fistula placement on this admission (vein mapping completed 12/22)

## 2023-01-14 NOTE — PROGRESS NOTE ADULT - PROBLEM SELECTOR PLAN 3
- troponin peaked at > 49468   - [facility] 12/06 with IABP placement revealed that 3 grafts are closed w/ distal native disease from remaining LAD graft  - c/w ASA and Statin  - c/w Lopressor 25mg BID - troponin peaked at > 89745   - [facility] 12/06 with IABP placement revealed that 3 grafts are closed w/ distal native disease from remaining LAD graft  - c/w ASA and Statin  - c/w Lopressor 25mg BID - troponin peaked at > 30912   - [facility] 12/06 with IABP placement revealed that 3 grafts are closed w/ distal native disease from remaining LAD graft  - c/w ASA and Statin  - c/w Lopressor 25mg BID

## 2023-01-14 NOTE — CONSULT NOTE ADULT - PROBLEM SELECTOR RECOMMENDATION 9
Will stop NPH insulin since on oral feeds now.  Will start Lantus 18 units at bed time.  Will start Admelog 6 units before each meal in addition to Admelog correction scale coverage.

## 2023-01-14 NOTE — PROGRESS NOTE ADULT - PROBLEM SELECTOR PLAN 1
ischemic with most recent TTE 1/9 showing EF 25%  Unable to tolerate GDMT due to borderline BPs requiring midodrine, although now with improvement in BP  Attemp to wean midodrine 20mg TID   c/w iHD  Strict I&Os ischemic with most recent TTE 1/9 showing EF 25%  Unable to tolerate GDMT due to borderline BPs requiring midodrine, although now with improvement in BP  Attemp to wean midodrine 20mg TID   c/w HD   toprol started 1/13 lop 25 mg po bid   continue amio 200 mg po qd   Strict I&Os

## 2023-01-14 NOTE — PROGRESS NOTE ADULT - ATTENDING COMMENTS
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 Maurizio Mancera MD  O: [phone]  Contact me on teams

## 2023-01-14 NOTE — PROGRESS NOTE ADULT - ASSESSMENT
61 YO M with a history of CAD s/p 4v CABG ~2019 in [facility], DM2 (A1c 7.3%), and HTN who initially presented to OSH with abdominal pain and n/v and found to have pancreatitis with lipase > 3000 though also with elevated troponins. CT abdomen revealed acute pancreatitis and his initial LVEF was 40-45%. He developed MSOF with hypoxic respiratory failure requiring intubation and ERIC and went into hypoxic PEA arrest requiring ACLS and due to persistent hypoxia and hypotension on pressors after ROSC was cannulated to [facility] ECMO (LFV, RFA, no anterograde perfusion catheter) on 11/25. Notably CTH that day revealed small subdural hemorrhage.     His post arrest TTE on 11/26 showed a significantly worse LVEF of 5-10% with an AV that was only minimally opening. Since then he has had improvement in his LV function to ~35-40% and improved pulsatility while tolerating progressive slow ECMO weans. ECMO turndown (note in chart 11/30) was reassuring for ability to decannulate to IABP/inotropes. LHC 12/06 showed closure of his 3 vein grafts with graft to LAD patent though with distal native disease. No coronary intervention was done. IABP placed, ECMO successfully decannulated 12/08 (transfused 2u pRBCs during).     His ARDS has improved and he's now s/p trach (decannulated 1/8). He's now off IABP, removed 12/17, and inotropes, however has previously been unable to tolerate GDMT due to soft BPs and renal failure requiring dialysis. 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 He's afebrile with improving leukocytosis. He has urine output but not enough to stay euvolemic. He's tolerating intermittent HD and remains on midodrine for BP support.     11/24 Transfer from [facility] to SICU c/f STEMI, abd US +GB stones, pericholecystic fluid  11/25 VA ECMO s/p hypoxic respiratory arrest/PEA arrest, CTH 4mm subdural, ERIC  12/5 CVVHD, RUQ US neg   12/6 IABP placed in cath lab, LIMA to LAD patent, RCA and LCx down, CTH subdural decreased in size, persistent pancreatic inflammation   12/7 TTE turndown, EF 30-40%, nml RV, MR mod, mod TR  12/8 ECMO decannulation, aflutter DCCV x 2  12/12 Mental status change- CTH no change, CT perfusion/CTA H/N neg for LVO, +low grade watershed infarct to L MCA  12/16 Mitral clip x 1, TRACH 7 Shiley XLT w/ ENT  12/17 IABP dced  12/26 +clinton BCx   12/27 R groin vac  1/8 trach decannulation   1/10 passed FEEST  1/11 R permacath placed by IR  1/13 Transferred to SDU 61 YO M with a history of CAD s/p 4v CABG ~2019 in [facility], DM2 (A1c 7.3%), and HTN who initially presented to OSH with abdominal pain and n/v and found to have pancreatitis with lipase > 3000 though also with elevated troponins. CT abdomen revealed acute pancreatitis and his initial LVEF was 40-45%. He developed MSOF with hypoxic respiratory failure requiring intubation and ERIC and went into hypoxic PEA arrest requiring ACLS and due to persistent hypoxia and hypotension on pressors after ROSC was cannulated to VA ECMO (LFV, RFA, no anterograde perfusion catheter) on 11/25. Notably CTH that day revealed small subdural hemorrhage.     His post arrest TTE on 11/26 showed a significantly worse LVEF of 5-10% with an AV that was only minimally opening. Since then he has had improvement in his LV function to ~35-40% and improved pulsatility while tolerating progressive slow ECMO weans. ECMO turndown (note in chart 11/30) was reassuring for ability to decannulate to IABP/inotropes. LHC 12/06 showed closure of his 3 vein grafts with graft to LAD patent though with distal native disease. No coronary intervention was done. IABP placed, ECMO successfully decannulated 12/08 (transfused 2u pRBCs during).     His ARDS has improved and he's now s/p trach (decannulated 1/8). He's now off IABP, removed 12/17, and inotropes, however has previously been unable to tolerate GDMT due to soft BPs and renal failure requiring dialysis. 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 He's afebrile with improving leukocytosis. He has urine output but not enough to stay euvolemic. He's tolerating intermittent HD and remains on midodrine for BP support.     11/24 Transfer from [facility] to SICU c/f STEMI, abd US +GB stones, pericholecystic fluid  11/25 VA ECMO s/p hypoxic respiratory arrest/PEA arrest, CTH 4mm subdural, ERIC  12/5 CVVHD, RUQ US neg   12/6 IABP placed in cath lab, LIMA to LAD patent, RCA and LCx down, CTH subdural decreased in size, persistent pancreatic inflammation   12/7 TTE turndown, EF 30-40%, nml RV, MR mod, mod TR  12/8 ECMO decannulation, aflutter DCCV x 2  12/12 Mental status change- CTH no change, CT perfusion/CTA H/N neg for LVO, +low grade watershed infarct to L MCA  12/16 Mitral clip x 1, TRACH 7 Shiley XLT w/ ENT  12/17 IABP dced  12/26 +clinton BCx   12/27 R groin vac  1/8 trach decannulation   1/10 passed FEEST  1/11 R permacath placed by IR  1/13 Transferred to SDU 63 YO M with a history of CAD s/p 4v CABG ~2019 in [facility], DM2 (A1c 7.3%), and HTN who initially presented to OSH with abdominal pain and n/v and found to have pancreatitis with lipase > 3000 though also with elevated troponins. CT abdomen revealed acute pancreatitis and his initial LVEF was 40-45%. He developed MSOF with hypoxic respiratory failure requiring intubation and ERIC and went into hypoxic PEA arrest requiring ACLS and due to persistent hypoxia and hypotension on pressors after ROSC was cannulated to VA ECMO (LFV, RFA, no anterograde perfusion catheter) on 11/25. Notably CTH that day revealed small subdural hemorrhage.     His post arrest TTE on 11/26 showed a significantly worse LVEF of 5-10% with an AV that was only minimally opening. Since then he has had improvement in his LV function to ~35-40% and improved pulsatility while tolerating progressive slow ECMO weans. ECMO turndown (note in chart 11/30) was reassuring for ability to decannulate to IABP/inotropes. LHC 12/06 showed closure of his 3 vein grafts with graft to LAD patent though with distal native disease. No coronary intervention was done. IABP placed, ECMO successfully decannulated 12/08 (transfused 2u pRBCs during).     His ARDS has improved and he's now s/p trach (decannulated 1/8). He's now off IABP, removed 12/17, and inotropes, however has previously been unable to tolerate GDMT due to soft BPs and renal failure requiring dialysis. 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 He's afebrile with improving leukocytosis. He has urine output but not enough to stay euvolemic. He's tolerating intermittent HD and remains on midodrine for BP support.     11/24 Transfer from [facility] to SICU c/f STEMI, abd US +GB stones, pericholecystic fluid  11/25 VA ECMO s/p hypoxic respiratory arrest/PEA arrest, CTH 4mm subdural, ERIC  12/5 CVVHD, RUQ US neg   12/6 IABP placed in cath lab, LIMA to LAD patent, RCA and LCx down, CTH subdural decreased in size, persistent pancreatic inflammation   12/7 TTE turndown, EF 30-40%, nml RV, MR mod, mod TR  12/8 ECMO decannulation, aflutter DCCV x 2  12/12 Mental status change- CTH no change, CT perfusion/CTA H/N neg for LVO, +low grade watershed infarct to L MCA  12/16 Mitral clip x 1, TRACH 7 Shiley XLT w/ ENT  12/17 IABP dced  12/26 +clinton BCx   12/27 R groin vac  1/8 trach decannulation   1/10 passed FEEST  1/11 R permacath placed by IR  1/13 Transferred to SDU 61 YO M with a history of CAD s/p 4v CABG ~2019 in [facility], DM2 (A1c 7.3%), and HTN who initially presented to OSH with abdominal pain and n/v and found to have pancreatitis with lipase > 3000 though also with elevated troponins. CT abdomen revealed acute pancreatitis and his initial LVEF was 40-45%. He developed MSOF with hypoxic respiratory failure requiring intubation and ERIC and went into hypoxic PEA arrest requiring ACLS and due to persistent hypoxia and hypotension on pressors after ROSC was cannulated to VA ECMO (LFV, RFA, no anterograde perfusion catheter) on 11/25. Notably CTH that day revealed small subdural hemorrhage.     His post arrest TTE on 11/26 showed a significantly worse LVEF of 5-10% with an AV that was only minimally opening. Since then he has had improvement in his LV function to ~35-40% and improved pulsatility while tolerating progressive slow ECMO weans. ECMO turndown (note in chart 11/30) was reassuring for ability to decannulate to IABP/inotropes. LHC 12/06 showed closure of his 3 vein grafts with graft to LAD patent though with distal native disease. No coronary intervention was done. IABP placed, ECMO successfully decannulated 12/08 (transfused 2u pRBCs during).     His ARDS has improved and he's now s/p trach (decannulated 1/8). He's now off IABP, removed 12/17, and inotropes, however has previously been unable to tolerate GDMT due to soft BPs and renal failure requiring dialysis. 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 He's afebrile with improving leukocytosis. He has urine output but not enough to stay euvolemic. He's tolerating intermittent HD and remains on midodrine for BP support.     11/24 Transfer from [facility] to SICU c/f STEMI, abd US +GB stones, pericholecystic fluid  11/25 VA ECMO s/p hypoxic respiratory arrest/PEA arrest, CTH 4mm subdural, ERIC  12/5 CVVHD, RUQ US neg   12/6 IABP placed in cath lab, LIMA to LAD patent, RCA and LCx down, CTH subdural decreased in size, persistent pancreatic inflammation   12/7 TTE turndown, EF 30-40%, nml RV, MR mod, mod TR  12/8 ECMO decannulation, aflutter DCCV x 2  12/12 Mental status change- CTH no change, CT perfusion/CTA H/N neg for LVO, +low grade watershed infarct to L MCA  12/16 Mitral clip x 1, TRACH 7 Shiley XLT w/ ENT  12/17 IABP dced  12/26 +clinton BCx   12/27 R groin vac  1/8 trach decannulation   1/10 passed FEEST  1/11 R permacath placed by IR  1/13 Transferred to SDU  1/14 VSS; RSR ; hd as per renal MWF; continue vac to rt groin ; nutritional optimization and PT; vasc sx called for AV fistula placement on this  63 YO M with a history of CAD s/p 4v CABG ~2019 in [facility], DM2 (A1c 7.3%), and HTN who initially presented to OSH with abdominal pain and n/v and found to have pancreatitis with lipase > 3000 though also with elevated troponins. CT abdomen revealed acute pancreatitis and his initial LVEF was 40-45%. He developed MSOF with hypoxic respiratory failure requiring intubation and ERIC and went into hypoxic PEA arrest requiring ACLS and due to persistent hypoxia and hypotension on pressors after ROSC was cannulated to VA ECMO (LFV, RFA, no anterograde perfusion catheter) on 11/25. Notably CTH that day revealed small subdural hemorrhage.     His post arrest TTE on 11/26 showed a significantly worse LVEF of 5-10% with an AV that was only minimally opening. Since then he has had improvement in his LV function to ~35-40% and improved pulsatility while tolerating progressive slow ECMO weans. ECMO turndown (note in chart 11/30) was reassuring for ability to decannulate to IABP/inotropes. LHC 12/06 showed closure of his 3 vein grafts with graft to LAD patent though with distal native disease. No coronary intervention was done. IABP placed, ECMO successfully decannulated 12/08 (transfused 2u pRBCs during).     His ARDS has improved and he's now s/p trach (decannulated 1/8). He's now off IABP, removed 12/17, and inotropes, however has previously been unable to tolerate GDMT due to soft BPs and renal failure requiring dialysis. 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 He's afebrile with improving leukocytosis. He has urine output but not enough to stay euvolemic. He's tolerating intermittent HD and remains on midodrine for BP support.     11/24 Transfer from [facility] to SICU c/f STEMI, abd US +GB stones, pericholecystic fluid  11/25 VA ECMO s/p hypoxic respiratory arrest/PEA arrest, CTH 4mm subdural, ERIC  12/5 CVVHD, RUQ US neg   12/6 IABP placed in cath lab, LIMA to LAD patent, RCA and LCx down, CTH subdural decreased in size, persistent pancreatic inflammation   12/7 TTE turndown, EF 30-40%, nml RV, MR mod, mod TR  12/8 ECMO decannulation, aflutter DCCV x 2  12/12 Mental status change- CTH no change, CT perfusion/CTA H/N neg for LVO, +low grade watershed infarct to L MCA  12/16 Mitral clip x 1, TRACH 7 Shiley XLT w/ ENT  12/17 IABP dced  12/26 +clinton BCx   12/27 R groin vac  1/8 trach decannulation   1/10 passed FEEST  1/11 R permacath placed by IR  1/13 Transferred to SDU  1/14 VSS; RSR ; hd as per renal MWF; continue vac to rt groin ; nutritional optimization and PT; vasc sx called for AV fistula placement on this  61 YO M with a history of CAD s/p 4v CABG ~2019 in [facility], DM2 (A1c 7.3%), and HTN who initially presented to OSH with abdominal pain and n/v and found to have pancreatitis with lipase > 3000 though also with elevated troponins. CT abdomen revealed acute pancreatitis and his initial LVEF was 40-45%. He developed MSOF with hypoxic respiratory failure requiring intubation and ERIC and went into hypoxic PEA arrest requiring ACLS and due to persistent hypoxia and hypotension on pressors after ROSC was cannulated to VA ECMO (LFV, RFA, no anterograde perfusion catheter) on 11/25. Notably CTH that day revealed small subdural hemorrhage.     His post arrest TTE on 11/26 showed a significantly worse LVEF of 5-10% with an AV that was only minimally opening. Since then he has had improvement in his LV function to ~35-40% and improved pulsatility while tolerating progressive slow ECMO weans. ECMO turndown (note in chart 11/30) was reassuring for ability to decannulate to IABP/inotropes. LHC 12/06 showed closure of his 3 vein grafts with graft to LAD patent though with distal native disease. No coronary intervention was done. IABP placed, ECMO successfully decannulated 12/08 (transfused 2u pRBCs during).     His ARDS has improved and he's now s/p trach (decannulated 1/8). He's now off IABP, removed 12/17, and inotropes, however has previously been unable to tolerate GDMT due to soft BPs and renal failure requiring dialysis. 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 He's afebrile with improving leukocytosis. He has urine output but not enough to stay euvolemic. He's tolerating intermittent HD and remains on midodrine for BP support.     11/24 Transfer from [facility] to SICU c/f STEMI, abd US +GB stones, pericholecystic fluid  11/25 VA ECMO s/p hypoxic respiratory arrest/PEA arrest, CTH 4mm subdural, ERIC  12/5 CVVHD, RUQ US neg   12/6 IABP placed in cath lab, LIMA to LAD patent, RCA and LCx down, CTH subdural decreased in size, persistent pancreatic inflammation   12/7 TTE turndown, EF 30-40%, nml RV, MR mod, mod TR  12/8 ECMO decannulation, aflutter DCCV x 2  12/12 Mental status change- CTH no change, CT perfusion/CTA H/N neg for LVO, +low grade watershed infarct to L MCA  12/16 Mitral clip x 1, TRACH 7 Shiley XLT w/ ENT  12/17 IABP dced  12/26 +clinton BCx   12/27 R groin vac  1/8 trach decannulation   1/10 passed FEEST  1/11 R permacath placed by IR  1/13 Transferred to SDU  1/14 VSS; RSR ; hd as per renal MWF; continue vac to rt groin ; nutritional optimization and PT; vasc sx called for AV fistula placement on this  63 YO M with a history of CAD s/p 4v CABG ~2019 in [facility], DM2 (A1c 7.3%), and HTN who initially presented to OSH with abdominal pain and n/v and found to have pancreatitis with lipase > 3000 though also with elevated troponins. CT abdomen revealed acute pancreatitis and his initial LVEF was 40-45%. He developed MSOF with hypoxic respiratory failure requiring intubation and ERIC and went into hypoxic PEA arrest requiring ACLS and due to persistent hypoxia and hypotension on pressors after ROSC was cannulated to VA ECMO (LFV, RFA, no anterograde perfusion catheter) on 11/25. Notably CTH that day revealed small subdural hemorrhage.     His post arrest TTE on 11/26 showed a significantly worse LVEF of 5-10% with an AV that was only minimally opening. Since then he has had improvement in his LV function to ~35-40% and improved pulsatility while tolerating progressive slow ECMO weans. ECMO turndown (note in chart 11/30) was reassuring for ability to decannulate to IABP/inotropes. LHC 12/06 showed closure of his 3 vein grafts with graft to LAD patent though with distal native disease. No coronary intervention was done. IABP placed, ECMO successfully decannulated 12/08 (transfused 2u pRBCs during).     His ARDS has improved and he's now s/p trach (decannulated 1/8). He's now off IABP, removed 12/17, and inotropes, however has previously been unable to tolerate GDMT due to soft BPs and renal failure requiring dialysis. 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 He's afebrile with improving leukocytosis. He has urine output but not enough to stay euvolemic. He's tolerating intermittent HD and remains on midodrine for BP support.     11/24 Transfer [facility] to SICU c/f STEMI, abd US +GB stones, pericholecystic fluid  11/25 VA ECMO s/p hypoxic respiratory arrest/PEA arrest, CTH 4mm subdural, ERIC  12/5 CVVHD, RUQ US neg   12/6 IABP placed in cath lab, LIMA to LAD patent, RCA and LCx down, CTH subdural decreased in size, persistent pancreatic inflammation   12/7 TTE turndown, EF 30-40%, nml RV, MR mod, mod TR  12/8 ECMO decannulation, aflutter DCCV x 2  12/12 Mental status change- CTH no change, CT perfusion/CTA H/N neg for LVO, +low grade watershed infarct to L MCA  12/16 Mitral clip x 1, TRACH 7 Shiley XLT w/ ENT  12/17 IABP dced  12/26 +clinton BCx   12/27 R groin vac  1/8 trach decannulation   1/10 passed FEEST  1/11 R permacath placed by IR  1/13 Transferred to SDU  1/14 VSS; RSR ; hd as per renal MWF; continue vac to rt groin ; nutritional optimization and PT; vasc sx called for AV fistula placement on this admission (vein mapping completed 12/22) ; continue capsofungin as per Dr. Rodrigues; endo consulted today for dm management   discharge planning- acute rehab when stable    61 YO M with a history of CAD s/p 4v CABG ~2019 in [facility], DM2 (A1c 7.3%), and HTN who initially presented to OSH with abdominal pain and n/v and found to have pancreatitis with lipase > 3000 though also with elevated troponins. CT abdomen revealed acute pancreatitis and his initial LVEF was 40-45%. He developed MSOF with hypoxic respiratory failure requiring intubation and ERIC and went into hypoxic PEA arrest requiring ACLS and due to persistent hypoxia and hypotension on pressors after ROSC was cannulated to VA ECMO (LFV, RFA, no anterograde perfusion catheter) on 11/25. Notably CTH that day revealed small subdural hemorrhage.     His post arrest TTE on 11/26 showed a significantly worse LVEF of 5-10% with an AV that was only minimally opening. Since then he has had improvement in his LV function to ~35-40% and improved pulsatility while tolerating progressive slow ECMO weans. ECMO turndown (note in chart 11/30) was reassuring for ability to decannulate to IABP/inotropes. LHC 12/06 showed closure of his 3 vein grafts with graft to LAD patent though with distal native disease. No coronary intervention was done. IABP placed, ECMO successfully decannulated 12/08 (transfused 2u pRBCs during).     His ARDS has improved and he's now s/p trach (decannulated 1/8). He's now off IABP, removed 12/17, and inotropes, however has previously been unable to tolerate GDMT due to soft BPs and renal failure requiring dialysis. 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 He's afebrile with improving leukocytosis. He has urine output but not enough to stay euvolemic. He's tolerating intermittent HD and remains on midodrine for BP support.     11/24 Transfer from [facility] to SICU c/f STEMI, abd US +GB stones, pericholecystic fluid  11/25 VA ECMO s/p hypoxic respiratory arrest/PEA arrest, CTH 4mm subdural, ERIC  12/5 CVVHD, RUQ US neg   12/6 IABP placed in cath lab, LIMA to LAD patent, RCA and LCx down, CTH subdural decreased in size, persistent pancreatic inflammation   12/7 TTE turndown, EF 30-40%, nml RV, MR mod, mod TR  12/8 ECMO decannulation, aflutter DCCV x 2  12/12 Mental status change- CTH no change, CT perfusion/CTA H/N neg for LVO, +low grade watershed infarct to L MCA  12/16 Mitral clip x 1, TRACH 7 Shiley XLT w/ ENT  12/17 IABP dced  12/26 +clinton BCx   12/27 R groin vac  1/8 trach decannulation   1/10 passed FEEST  1/11 R permacath placed by IR  1/13 Transferred to SDU  1/14 VSS; RSR ; hd as per renal MWF; continue vac to rt groin ; nutritional optimization and PT; vasc sx called for AV fistula placement on this admission (vein mapping completed 12/22) ; continue capsofungin as per Dr. Rodrigues; endo consulted today for dm management   discharge planning- acute rehab when stable    61 YO M with a history of CAD s/p 4v CABG ~2019 in [facility], DM2 (A1c 7.3%), and HTN who initially presented to OSH with abdominal pain and n/v and found to have pancreatitis with lipase > 3000 though also with elevated troponins. CT abdomen revealed acute pancreatitis and his initial LVEF was 40-45%. He developed MSOF with hypoxic respiratory failure requiring intubation and ERIC and went into hypoxic PEA arrest requiring ACLS and due to persistent hypoxia and hypotension on pressors after ROSC was cannulated to VA ECMO (LFV, RFA, no anterograde perfusion catheter) on 11/25. Notably CTH that day revealed small subdural hemorrhage.     His post arrest TTE on 11/26 showed a significantly worse LVEF of 5-10% with an AV that was only minimally opening. Since then he has had improvement in his LV function to ~35-40% and improved pulsatility while tolerating progressive slow ECMO weans. ECMO turndown (note in chart 11/30) was reassuring for ability to decannulate to IABP/inotropes. LHC 12/06 showed closure of his 3 vein grafts with graft to LAD patent though with distal native disease. No coronary intervention was done. IABP placed, ECMO successfully decannulated 12/08 (transfused 2u pRBCs during).     His ARDS has improved and he's now s/p trach (decannulated 1/8). He's now off IABP, removed 12/17, and inotropes, however has previously been unable to tolerate GDMT due to soft BPs and renal failure requiring dialysis. 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 He's afebrile with improving leukocytosis. He has urine output but not enough to stay euvolemic. He's tolerating intermittent HD and remains on midodrine for BP support.     11/24 Transfer from [facility] to SICU c/f STEMI, abd US +GB stones, pericholecystic fluid  11/25 VA ECMO s/p hypoxic respiratory arrest/PEA arrest, CTH 4mm subdural, ERIC  12/5 CVVHD, RUQ US neg   12/6 IABP placed in cath lab, LIMA to LAD patent, RCA and LCx down, CTH subdural decreased in size, persistent pancreatic inflammation   12/7 TTE turndown, EF 30-40%, nml RV, MR mod, mod TR  12/8 ECMO decannulation, aflutter DCCV x 2  12/12 Mental status change- CTH no change, CT perfusion/CTA H/N neg for LVO, +low grade watershed infarct to L MCA  12/16 Mitral clip x 1, TRACH 7 Shiley XLT w/ ENT  12/17 IABP dced  12/26 +clinton BCx   12/27 R groin vac  1/8 trach decannulation   1/10 passed FEEST  1/11 R permacath placed by IR  1/13 Transferred to SDU  1/14 VSS; RSR ; hd as per renal MWF; continue vac to rt groin ; nutritional optimization and PT; vasc sx called for AV fistula placement on this admission (vein mapping completed 12/22) ; continue capsofungin as per Dr. Rodrigues; endo consulted today for dm management   discharge planning- acute rehab when stable

## 2023-01-14 NOTE — CONSULT NOTE ADULT - SUBJECTIVE AND OBJECTIVE BOX
HPI:  Patient is a 61 y/o male with PMH of CABG, DM, HTN, HLD presenting as transfer from [place] for c/f STEMI. PTA arrival received 325 aspirin, 600 plavix, and no heparin drip started. Around 1:30 am patient had multiple episodes of non-bloody diarrhea and emesis with associated abdominal pain and chest pain, unable to localize, non-radiating, with associated SOB and no associated palpitations or diaphoresis. No recent fevers/chills, cough, rashes, or changes in urination. Does report mild diffuse back pain; started 5 days ago in setting on injury while walking and lifting objects. Denies focal weakness, numbness/tingling, or LOC due does report mild dizziness.    Patient seen and examined in ED. Hemodynamically stable, alert and awake, A&Ox3. Daughter at bedside. Reports abdominal pain, and nausea. Abdomen is soft, tender in epigastric area and RUQ. Denies chest pain, SOB. WBC 20, T.bili 3.4, Lipase 300. RUQ US demonstrated gallbladder stones, pericholecystic fluid.  (24 Nov 2022 15:10)  Patient has history of diabetes. Patient follows up with PCP for diabetes management.    PAST MEDICAL & SURGICAL HISTORY:      FAMILY HISTORY:      Social History:    Outpatient Medications:    MEDICATIONS  (STANDING):  aspirin  chewable 81 milliGRAM(s) Oral daily  atorvastatin 80 milliGRAM(s) Oral at bedtime  benzonatate 100 milliGRAM(s) Oral every 8 hours  caspofungin IVPB 50 milliGRAM(s) IV Intermittent every 24 hours  chlorhexidine 2% Cloths 1 Application(s) Topical <User Schedule>  chlorhexidine 4% Liquid 1 Application(s) Topical <User Schedule>  dextrose 50% Injectable 25 Gram(s) IV Push once  dextrose 50% Injectable 12.5 Gram(s) IV Push once  dextrose 50% Injectable 25 Gram(s) IV Push once  dextrose Oral Gel 15 Gram(s) Oral once  epoetin teena-epbx (RETACRIT) Injectable 5000 Unit(s) IV Push <User Schedule>  glucagon  Injectable 1 milliGRAM(s) IntraMuscular once  heparin   Injectable 5000 Unit(s) SubCutaneous every 8 hours  insulin glargine Injectable (LANTUS) 18 Unit(s) SubCutaneous at bedtime  insulin lispro (ADMELOG) corrective regimen sliding scale   SubCutaneous three times a day before meals  insulin lispro (ADMELOG) corrective regimen sliding scale   SubCutaneous at bedtime  insulin lispro Injectable (ADMELOG) 6 Unit(s) SubCutaneous three times a day before meals  metoprolol tartrate 25 milliGRAM(s) Oral two times a day  midodrine 20 milliGRAM(s) Oral every 8 hours  multivitamin/minerals/iron Oral Solution (CENTRUM) 15 milliLiter(s) Oral daily  pantoprazole    Tablet 40 milliGRAM(s) Oral before breakfast  polyethylene glycol 3350 17 Gram(s) Oral daily  senna 2 Tablet(s) Oral at bedtime  sevelamer carbonate Powder 1600 milliGRAM(s) Oral three times a day with meals  thiamine 100 milliGRAM(s) Enteral Tube daily    MEDICATIONS  (PRN):  acetaminophen    Suspension .. 650 milliGRAM(s) Oral every 6 hours PRN Temp greater or equal to 38.5C (101.3F)  guaiFENesin Oral Liquid (Sugar-Free) 200 milliGRAM(s) Oral every 6 hours PRN Cough  hydrocodone/homatropine Syrup 5 milliLiter(s) Oral every 8 hours PRN Cough  simethicone 80 milliGRAM(s) Chew daily PRN Gas  sodium chloride 0.9% lock flush 10 milliLiter(s) IV Push every 1 hour PRN Pre/post blood products, medications, blood draw, and to maintain line patency      Allergies    No Known Allergies    Intolerances      Review of Systems:  Constitutional: No fever, no chills  Eyes: No blurry vision  Neuro: No tremors  HEENT: No pain, no neck swelling  Cardiovascular: No chest pain, no palpitations  Respiratory: No SOB, no cough  GI: No nausea, vomiting, abdominal pain  : No dysuria  Skin: no rash  MSK: Has leg swelling.  Psych: no depression  Endocrine: no polyuria, polydipsia    UNABLE TO OBTAIN    ALL OTHER SYSTEMS REVIEWED AND NEGATIVE        PHYSICAL EXAM:  VITALS: T(C): 36.5 (01-14-23 @ 15:00)  T(F): 97.7 (01-14-23 @ 15:00), Max: 98.6 (01-14-23 @ 10:59)  HR: 77 (01-14-23 @ 15:00) (76 - 93)  BP: 90/50 (01-14-23 @ 15:00) (90/50 - 111/60)  RR:  (16 - 19)  SpO2:  (94% - 100%)  Wt(kg): --  GENERAL: NAD, well-groomed, well-developed  EYES: No proptosis, no lid lag  HEENT:  Atraumatic, Normocephalic  THYROID: Normal size, no palpable nodules  RESPIRATORY: Clear to auscultation bilaterally; No rales, rhonchi, wheezing  CARDIOVASCULAR: Si S2, No murmurs;  GI: Soft, non distended, normal bowel sounds  SKIN: Dry, intact, No rashes or lesions  MUSCULOSKELETAL: Has BL lower extremity edema.  NEURO:  no tremor, sensation decreased in feet BL,    POCT Blood Glucose.: 200 mg/dL (01-14-23 @ 11:16)  POCT Blood Glucose.: 133 mg/dL (01-14-23 @ 05:57)  POCT Blood Glucose.: 109 mg/dL (01-13-23 @ 22:44)  POCT Blood Glucose.: 87 mg/dL (01-13-23 @ 16:36)  POCT Blood Glucose.: 143 mg/dL (01-13-23 @ 12:29)  POCT Blood Glucose.: 152 mg/dL (01-13-23 @ 10:22)  POCT Blood Glucose.: 95 mg/dL (01-13-23 @ 05:47)  POCT Blood Glucose.: 141 mg/dL (01-13-23 @ 00:27)  POCT Blood Glucose.: 170 mg/dL (01-12-23 @ 17:18)  POCT Blood Glucose.: 190 mg/dL (01-12-23 @ 11:55)  POCT Blood Glucose.: 162 mg/dL (01-12-23 @ 06:19)  POCT Blood Glucose.: 136 mg/dL (01-12-23 @ 00:39)                            9.8    11.02 )-----------( 176      ( 14 Jan 2023 07:36 )             32.4       01-14    133<L>  |  91<L>  |  33<H>  ----------------------------<  122<H>  3.8   |  27  |  3.72<H>    eGFR: 18<L>    Ca    8.9      01-14  Mg     3.2     01-14  Phos  4.1     01-14    TPro  7.8  /  Alb  3.6  /  TBili  0.7  /  DBili  x   /  AST  18  /  ALT  8<L>  /  AlkPhos  93  01-14      Thyroid Function Tests:          12-05 Chol -- Direct LDL -- LDL calculated -- HDL -- Trig 161<H>, 11-26 Chol -- Direct LDL -- LDL calculated -- HDL -- Trig 337<H>, 11-24 Chol 98 Direct LDL -- LDL calculated 63 HDL 15<L> Trig 103    Radiology:                    HPI:  Patient is a 63 y/o male with PMH of CABG, DM, HTN, HLD presenting as transfer from [place] for c/f STEMI. PTA arrival received 325 aspirin, 600 plavix, and no heparin drip started. Around 1:30 am patient had multiple episodes of non-bloody diarrhea and emesis with associated abdominal pain and chest pain, unable to localize, non-radiating, with associated SOB and no associated palpitations or diaphoresis. No recent fevers/chills, cough, rashes, or changes in urination. Does report mild diffuse back pain; started 5 days ago in setting on injury while walking and lifting objects. Denies focal weakness, numbness/tingling, or LOC due does report mild dizziness.    Patient seen and examined in ED. Hemodynamically stable, alert and awake, A&Ox3. Daughter at bedside. Reports abdominal pain, and nausea. Abdomen is soft, tender in epigastric area and RUQ. Denies chest pain, SOB. WBC 20, T.bili 3.4, Lipase 300. RUQ US demonstrated gallbladder stones, pericholecystic fluid.  (24 Nov 2022 15:10)  Patient has history of diabetes. Patient follows up with PCP for diabetes management.    PAST MEDICAL & SURGICAL HISTORY:      FAMILY HISTORY:      Social History:    Outpatient Medications:    MEDICATIONS  (STANDING):  aspirin  chewable 81 milliGRAM(s) Oral daily  atorvastatin 80 milliGRAM(s) Oral at bedtime  benzonatate 100 milliGRAM(s) Oral every 8 hours  caspofungin IVPB 50 milliGRAM(s) IV Intermittent every 24 hours  chlorhexidine 2% Cloths 1 Application(s) Topical <User Schedule>  chlorhexidine 4% Liquid 1 Application(s) Topical <User Schedule>  dextrose 50% Injectable 25 Gram(s) IV Push once  dextrose 50% Injectable 12.5 Gram(s) IV Push once  dextrose 50% Injectable 25 Gram(s) IV Push once  dextrose Oral Gel 15 Gram(s) Oral once  epoetin teena-epbx (RETACRIT) Injectable 5000 Unit(s) IV Push <User Schedule>  glucagon  Injectable 1 milliGRAM(s) IntraMuscular once  heparin   Injectable 5000 Unit(s) SubCutaneous every 8 hours  insulin glargine Injectable (LANTUS) 18 Unit(s) SubCutaneous at bedtime  insulin lispro (ADMELOG) corrective regimen sliding scale   SubCutaneous three times a day before meals  insulin lispro (ADMELOG) corrective regimen sliding scale   SubCutaneous at bedtime  insulin lispro Injectable (ADMELOG) 6 Unit(s) SubCutaneous three times a day before meals  metoprolol tartrate 25 milliGRAM(s) Oral two times a day  midodrine 20 milliGRAM(s) Oral every 8 hours  multivitamin/minerals/iron Oral Solution (CENTRUM) 15 milliLiter(s) Oral daily  pantoprazole    Tablet 40 milliGRAM(s) Oral before breakfast  polyethylene glycol 3350 17 Gram(s) Oral daily  senna 2 Tablet(s) Oral at bedtime  sevelamer carbonate Powder 1600 milliGRAM(s) Oral three times a day with meals  thiamine 100 milliGRAM(s) Enteral Tube daily    MEDICATIONS  (PRN):  acetaminophen    Suspension .. 650 milliGRAM(s) Oral every 6 hours PRN Temp greater or equal to 38.5C (101.3F)  guaiFENesin Oral Liquid (Sugar-Free) 200 milliGRAM(s) Oral every 6 hours PRN Cough  hydrocodone/homatropine Syrup 5 milliLiter(s) Oral every 8 hours PRN Cough  simethicone 80 milliGRAM(s) Chew daily PRN Gas  sodium chloride 0.9% lock flush 10 milliLiter(s) IV Push every 1 hour PRN Pre/post blood products, medications, blood draw, and to maintain line patency      Allergies    No Known Allergies    Intolerances      Review of Systems:  Constitutional: No fever, no chills  Eyes: No blurry vision  Neuro: No tremors  HEENT: No pain, no neck swelling  Cardiovascular: No chest pain, no palpitations  Respiratory: No SOB, no cough  GI: No nausea, vomiting, abdominal pain  : No dysuria  Skin: no rash  MSK: Has leg swelling.  Psych: no depression  Endocrine: no polyuria, polydipsia    UNABLE TO OBTAIN    ALL OTHER SYSTEMS REVIEWED AND NEGATIVE        PHYSICAL EXAM:  VITALS: T(C): 36.5 (01-14-23 @ 15:00)  T(F): 97.7 (01-14-23 @ 15:00), Max: 98.6 (01-14-23 @ 10:59)  HR: 77 (01-14-23 @ 15:00) (76 - 93)  BP: 90/50 (01-14-23 @ 15:00) (90/50 - 111/60)  RR:  (16 - 19)  SpO2:  (94% - 100%)  Wt(kg): --  GENERAL: NAD, well-groomed, well-developed  EYES: No proptosis, no lid lag  HEENT:  Atraumatic, Normocephalic  THYROID: Normal size, no palpable nodules  RESPIRATORY: Clear to auscultation bilaterally; No rales, rhonchi, wheezing  CARDIOVASCULAR: Si S2, No murmurs;  GI: Soft, non distended, normal bowel sounds  SKIN: Dry, intact, No rashes or lesions  MUSCULOSKELETAL: Has BL lower extremity edema.  NEURO:  no tremor, sensation decreased in feet BL,    POCT Blood Glucose.: 200 mg/dL (01-14-23 @ 11:16)  POCT Blood Glucose.: 133 mg/dL (01-14-23 @ 05:57)  POCT Blood Glucose.: 109 mg/dL (01-13-23 @ 22:44)  POCT Blood Glucose.: 87 mg/dL (01-13-23 @ 16:36)  POCT Blood Glucose.: 143 mg/dL (01-13-23 @ 12:29)  POCT Blood Glucose.: 152 mg/dL (01-13-23 @ 10:22)  POCT Blood Glucose.: 95 mg/dL (01-13-23 @ 05:47)  POCT Blood Glucose.: 141 mg/dL (01-13-23 @ 00:27)  POCT Blood Glucose.: 170 mg/dL (01-12-23 @ 17:18)  POCT Blood Glucose.: 190 mg/dL (01-12-23 @ 11:55)  POCT Blood Glucose.: 162 mg/dL (01-12-23 @ 06:19)  POCT Blood Glucose.: 136 mg/dL (01-12-23 @ 00:39)                            9.8    11.02 )-----------( 176      ( 14 Jan 2023 07:36 )             32.4       01-14    133<L>  |  91<L>  |  33<H>  ----------------------------<  122<H>  3.8   |  27  |  3.72<H>    eGFR: 18<L>    Ca    8.9      01-14  Mg     3.2     01-14  Phos  4.1     01-14    TPro  7.8  /  Alb  3.6  /  TBili  0.7  /  DBili  x   /  AST  18  /  ALT  8<L>  /  AlkPhos  93  01-14      Thyroid Function Tests:          12-05 Chol -- Direct LDL -- LDL calculated -- HDL -- Trig 161<H>, 11-26 Chol -- Direct LDL -- LDL calculated -- HDL -- Trig 337<H>, 11-24 Chol 98 Direct LDL -- LDL calculated 63 HDL 15<L> Trig 103    Radiology:                    HPI:  Patient is a 63 y/o male with PMH of CABG, DM, HTN, HLD presenting as transfer from [place] for c/f STEMI. PTA arrival received 325 aspirin, 600 plavix, and no heparin drip started. Around 1:30 am patient had multiple episodes of non-bloody diarrhea and emesis with associated abdominal pain and chest pain, unable to localize, non-radiating, with associated SOB and no associated palpitations or diaphoresis. No recent fevers/chills, cough, rashes, or changes in urination. Does report mild diffuse back pain; started 5 days ago in setting on injury while walking and lifting objects. Denies focal weakness, numbness/tingling, or LOC due does report mild dizziness.    Patient seen and examined in ED. Hemodynamically stable, alert and awake, A&Ox3. Daughter at bedside. Reports abdominal pain, and nausea. Abdomen is soft, tender in epigastric area and RUQ. Denies chest pain, SOB. WBC 20, T.bili 3.4, Lipase 300. RUQ US demonstrated gallbladder stones, pericholecystic fluid.  (24 Nov 2022 15:10)  Patient has history of diabetes. Patient follows up with PCP for diabetes management.    PAST MEDICAL & SURGICAL HISTORY:      FAMILY HISTORY:      Social History:    Outpatient Medications:    MEDICATIONS  (STANDING):  aspirin  chewable 81 milliGRAM(s) Oral daily  atorvastatin 80 milliGRAM(s) Oral at bedtime  benzonatate 100 milliGRAM(s) Oral every 8 hours  caspofungin IVPB 50 milliGRAM(s) IV Intermittent every 24 hours  chlorhexidine 2% Cloths 1 Application(s) Topical <User Schedule>  chlorhexidine 4% Liquid 1 Application(s) Topical <User Schedule>  dextrose 50% Injectable 25 Gram(s) IV Push once  dextrose 50% Injectable 12.5 Gram(s) IV Push once  dextrose 50% Injectable 25 Gram(s) IV Push once  dextrose Oral Gel 15 Gram(s) Oral once  epoetin teena-epbx (RETACRIT) Injectable 5000 Unit(s) IV Push <User Schedule>  glucagon  Injectable 1 milliGRAM(s) IntraMuscular once  heparin   Injectable 5000 Unit(s) SubCutaneous every 8 hours  insulin glargine Injectable (LANTUS) 18 Unit(s) SubCutaneous at bedtime  insulin lispro (ADMELOG) corrective regimen sliding scale   SubCutaneous three times a day before meals  insulin lispro (ADMELOG) corrective regimen sliding scale   SubCutaneous at bedtime  insulin lispro Injectable (ADMELOG) 6 Unit(s) SubCutaneous three times a day before meals  metoprolol tartrate 25 milliGRAM(s) Oral two times a day  midodrine 20 milliGRAM(s) Oral every 8 hours  multivitamin/minerals/iron Oral Solution (CENTRUM) 15 milliLiter(s) Oral daily  pantoprazole    Tablet 40 milliGRAM(s) Oral before breakfast  polyethylene glycol 3350 17 Gram(s) Oral daily  senna 2 Tablet(s) Oral at bedtime  sevelamer carbonate Powder 1600 milliGRAM(s) Oral three times a day with meals  thiamine 100 milliGRAM(s) Enteral Tube daily    MEDICATIONS  (PRN):  acetaminophen    Suspension .. 650 milliGRAM(s) Oral every 6 hours PRN Temp greater or equal to 38.5C (101.3F)  guaiFENesin Oral Liquid (Sugar-Free) 200 milliGRAM(s) Oral every 6 hours PRN Cough  hydrocodone/homatropine Syrup 5 milliLiter(s) Oral every 8 hours PRN Cough  simethicone 80 milliGRAM(s) Chew daily PRN Gas  sodium chloride 0.9% lock flush 10 milliLiter(s) IV Push every 1 hour PRN Pre/post blood products, medications, blood draw, and to maintain line patency      Allergies    No Known Allergies    Intolerances      Review of Systems:  Constitutional: No fever, no chills  Eyes: No blurry vision  Neuro: No tremors  HEENT: No pain, no neck swelling  Cardiovascular: No chest pain, no palpitations  Respiratory: No SOB, no cough  GI: No nausea, vomiting, abdominal pain  : No dysuria  Skin: no rash  MSK: Has leg swelling.  Psych: no depression  Endocrine: no polyuria, polydipsia    UNABLE TO OBTAIN    ALL OTHER SYSTEMS REVIEWED AND NEGATIVE        PHYSICAL EXAM:  VITALS: T(C): 36.5 (01-14-23 @ 15:00)  T(F): 97.7 (01-14-23 @ 15:00), Max: 98.6 (01-14-23 @ 10:59)  HR: 77 (01-14-23 @ 15:00) (76 - 93)  BP: 90/50 (01-14-23 @ 15:00) (90/50 - 111/60)  RR:  (16 - 19)  SpO2:  (94% - 100%)  Wt(kg): --  GENERAL: NAD, well-groomed, well-developed  EYES: No proptosis, no lid lag  HEENT:  Atraumatic, Normocephalic  THYROID: Normal size, no palpable nodules  RESPIRATORY: Clear to auscultation bilaterally; No rales, rhonchi, wheezing  CARDIOVASCULAR: Si S2, No murmurs;  GI: Soft, non distended, normal bowel sounds  SKIN: Dry, intact, No rashes or lesions  MUSCULOSKELETAL: Has BL lower extremity edema.  NEURO:  no tremor, sensation decreased in feet BL,    POCT Blood Glucose.: 200 mg/dL (01-14-23 @ 11:16)  POCT Blood Glucose.: 133 mg/dL (01-14-23 @ 05:57)  POCT Blood Glucose.: 109 mg/dL (01-13-23 @ 22:44)  POCT Blood Glucose.: 87 mg/dL (01-13-23 @ 16:36)  POCT Blood Glucose.: 143 mg/dL (01-13-23 @ 12:29)  POCT Blood Glucose.: 152 mg/dL (01-13-23 @ 10:22)  POCT Blood Glucose.: 95 mg/dL (01-13-23 @ 05:47)  POCT Blood Glucose.: 141 mg/dL (01-13-23 @ 00:27)  POCT Blood Glucose.: 170 mg/dL (01-12-23 @ 17:18)  POCT Blood Glucose.: 190 mg/dL (01-12-23 @ 11:55)  POCT Blood Glucose.: 162 mg/dL (01-12-23 @ 06:19)  POCT Blood Glucose.: 136 mg/dL (01-12-23 @ 00:39)                            9.8    11.02 )-----------( 176      ( 14 Jan 2023 07:36 )             32.4       01-14    133<L>  |  91<L>  |  33<H>  ----------------------------<  122<H>  3.8   |  27  |  3.72<H>    eGFR: 18<L>    Ca    8.9      01-14  Mg     3.2     01-14  Phos  4.1     01-14    TPro  7.8  /  Alb  3.6  /  TBili  0.7  /  DBili  x   /  AST  18  /  ALT  8<L>  /  AlkPhos  93  01-14      Thyroid Function Tests:          12-05 Chol -- Direct LDL -- LDL calculated -- HDL -- Trig 161<H>, 11-26 Chol -- Direct LDL -- LDL calculated -- HDL -- Trig 337<H>, 11-24 Chol 98 Direct LDL -- LDL calculated 63 HDL 15<L> Trig 103    Radiology:

## 2023-01-14 NOTE — PROGRESS NOTE ADULT - SUBJECTIVE AND OBJECTIVE BOX
[facility] DIVISION OF KIDNEY DISEASES AND HYPERTENSION -- FOLLOW UP NOTE  --------------------------------------------------------------------------------  HPI: 63 y/o male with PMH of CABG, DM, HTN, HLD presenting as transfer from [place] for c/f STEMI. Course c/b gallstone pancreatitis leading to ARDS and shock & cardiac arrest requiring [facility] ECMO. Had significant troponin elevation and his LVEF down to 8%. Pt was deemed to be too unstable to have an angiogram and potential PCI due to his co-morbidities & a new subdural bleed. Pt was seen for ERIC. Pt initiated on CRRT on 12/5/22 to optimize volume status and electrolytes. Pt underwent mitral clip OR (12/16/22). s/p trach on 12/16. Decannulated on 1/8. Candidemia, on caspofungin per ID. Completed Caspo. Transitioned to iHD on 12/20. Remains HD dependant. s/p RIJ tunneled HD cath on 1/11      24 hour events/subjective:  Patient seen and examined this morning at bedside. Reported feeling well without any acute complaints. Denied any chest pain, shortness of breath nausea, or vomiting, States he has been tolerating HD well. No acute issues noted overnight.           PAST HISTORY  --------------------------------------------------------------------------------  No significant changes to PMH, PSH, FHx, SHx, unless otherwise noted    ALLERGIES & MEDICATIONS  --------------------------------------------------------------------------------  Allergies    No Known Allergies    Intolerances      Standing Inpatient Medications  aMIOdarone    Tablet 200 milliGRAM(s) Oral daily  aspirin  chewable 81 milliGRAM(s) Oral daily  atorvastatin 80 milliGRAM(s) Oral at bedtime  benzonatate 100 milliGRAM(s) Oral every 8 hours  chlorhexidine 2% Cloths 1 Application(s) Topical <User Schedule>  chlorhexidine 4% Liquid 1 Application(s) Topical <User Schedule>  epoetin teena-epbx (RETACRIT) Injectable 5000 Unit(s) IV Push <User Schedule>  heparin   Injectable 5000 Unit(s) SubCutaneous every 8 hours  insulin lispro (ADMELOG) corrective regimen sliding scale   SubCutaneous every 6 hours  insulin NPH human recombinant 7 Unit(s) SubCutaneous every 6 hours  metoprolol tartrate 25 milliGRAM(s) Oral two times a day  midodrine 20 milliGRAM(s) Oral every 8 hours  multivitamin/minerals/iron Oral Solution (CENTRUM) 15 milliLiter(s) Oral daily  pantoprazole  Injectable 40 milliGRAM(s) IV Push daily  polyethylene glycol 3350 17 Gram(s) Oral daily  senna 2 Tablet(s) Oral at bedtime  sevelamer carbonate 1600 milliGRAM(s) Oral every 8 hours  thiamine 100 milliGRAM(s) Enteral Tube daily    PRN Inpatient Medications  acetaminophen    Suspension .. 650 milliGRAM(s) Oral every 6 hours PRN  guaiFENesin Oral Liquid (Sugar-Free) 200 milliGRAM(s) Oral every 6 hours PRN  hydrocodone/homatropine Syrup 5 milliLiter(s) Oral every 8 hours PRN  simethicone 80 milliGRAM(s) Chew daily PRN  sodium chloride 0.9% lock flush 10 milliLiter(s) IV Push every 1 hour PRN      REVIEW OF SYSTEMS      All other systems were reviewed and are negative, except as noted.    VITALS/PHYSICAL EXAM  --------------------------------------------------------------------------------  T(C): 36.9 (01-14-23 @ 07:00), Max: 36.9 (01-13-23 @ 23:30)  HR: 85 (01-14-23 @ 07:00) (79 - 94)  BP: 108/59 (01-14-23 @ 07:00) (91/51 - 118/67)  RR: 18 (01-14-23 @ 07:00) (10 - 26)  SpO2: 100% (01-14-23 @ 07:00) (94% - 100%)  Wt(kg): --        01-13-23 @ 07:01  -  01-14-23 @ 07:00  --------------------------------------------------------  IN: 400 mL / OUT: 1900 mL / NET: -1500 mL        Physical Exam:  	Gen: NAD  	HEENT: MMM  	Pulm: CTA B/L  	CV: S1S2  	Abd: Soft, +BS   	Ext: No LE edema B/L  	Neuro: Awake  	Skin: Warm and dry  	Vascular access: RIj tunneled HD catheter       LABS/STUDIES  --------------------------------------------------------------------------------              9.8    11.02 >-----------<  176      [01-14-23 @ 07:36]              32.4     133  |  91  |  33  ----------------------------<  122      [01-14-23 @ 07:36]  3.8   |  27  |  3.72        Ca     8.9     [01-14-23 @ 07:36]      Mg     3.2     [01-14-23 @ 07:36]      Phos  4.1     [01-14-23 @ 07:36]    TPro  7.8  /  Alb  3.6  /  TBili  0.7  /  DBili  x   /  AST  18  /  ALT  8   /  AlkPhos  93  [01-14-23 @ 07:36]          Creatinine Trend:  SCr 3.72 [01-14 @ 07:36]  SCr 4.21 [01-13 @ 00:49]  SCr 2.10 [01-12 @ 00:19]  SCr 4.12 [01-11 @ 00:20]  SCr 2.81 [01-10 @ 00:36]    Urinalysis - [12-26-22 @ 13:19]      Color Yellow / Appearance Clear / SG 1.013 / pH 6.0      Gluc Negative / Ketone Negative  / Bili Negative / Urobili Negative       Blood Large / Protein 100 mg/dL / Leuk Est Moderate / Nitrite Negative      RBC >50 / WBC 35 / Hyaline 1 / Gran  / Sq Epi  / Non Sq Epi 2 / Bacteria Few      Iron 39, TIBC 222, %sat 18      [12-31-22 @ 06:41]  Ferritin 346      [12-31-22 @ 06:42]  TSH 2.84      [12-10-22 @ 15:55]  Lipid: chol --, , HDL --, LDL --      [12-05-22 @ 00:50]    HBsAb 889.7      [12-20-22 @ 11:25]  HBsAg Nonreact      [01-04-23 @ 10:02]  HBcAb Reactive      [12-20-22 @ 11:25]  HCV 0.15, Nonreact      [12-20-22 @ 11:25]     [facility] DIVISION OF KIDNEY DISEASES AND HYPERTENSION -- FOLLOW UP NOTE  --------------------------------------------------------------------------------  HPI: 63 y/o male with PMH of CABG, DM, HTN, HLD presenting as transfer from [place] for c/f STEMI. Course c/b gallstone pancreatitis leading to ARDS and shock & cardiac arrest requiring [facility] ECMO. Had significant troponin elevation and his LVEF down to 8%. Pt was deemed to be too unstable to have an angiogram and potential PCI due to his co-morbidities & a new subdural bleed. Pt was seen for ERIC. Pt initiated on CRRT on 12/5/22 to optimize volume status and electrolytes. Pt underwent mitral clip OR (12/16/22). s/p trach on 12/16. Decannulated on 1/8. Candidemia, on caspofungin per ID. Completed Caspo. Transitioned to iHD on 12/20. Remains HD dependant. s/p RIJ tunneled HD cath on 1/11      24 hour events/subjective:  Patient seen and examined this morning at bedside. Reported feeling well without any acute complaints. Denied any chest pain, shortness of breath nausea, or vomiting, States he has been tolerating HD well. No acute issues noted overnight.           PAST HISTORY  --------------------------------------------------------------------------------  No significant changes to PMH, PSH, FHx, SHx, unless otherwise noted    ALLERGIES & MEDICATIONS  --------------------------------------------------------------------------------  Allergies    No Known Allergies    Intolerances      Standing Inpatient Medications  aMIOdarone    Tablet 200 milliGRAM(s) Oral daily  aspirin  chewable 81 milliGRAM(s) Oral daily  atorvastatin 80 milliGRAM(s) Oral at bedtime  benzonatate 100 milliGRAM(s) Oral every 8 hours  chlorhexidine 2% Cloths 1 Application(s) Topical <User Schedule>  chlorhexidine 4% Liquid 1 Application(s) Topical <User Schedule>  epoetin teena-epbx (RETACRIT) Injectable 5000 Unit(s) IV Push <User Schedule>  heparin   Injectable 5000 Unit(s) SubCutaneous every 8 hours  insulin lispro (ADMELOG) corrective regimen sliding scale   SubCutaneous every 6 hours  insulin NPH human recombinant 7 Unit(s) SubCutaneous every 6 hours  metoprolol tartrate 25 milliGRAM(s) Oral two times a day  midodrine 20 milliGRAM(s) Oral every 8 hours  multivitamin/minerals/iron Oral Solution (CENTRUM) 15 milliLiter(s) Oral daily  pantoprazole  Injectable 40 milliGRAM(s) IV Push daily  polyethylene glycol 3350 17 Gram(s) Oral daily  senna 2 Tablet(s) Oral at bedtime  sevelamer carbonate 1600 milliGRAM(s) Oral every 8 hours  thiamine 100 milliGRAM(s) Enteral Tube daily    PRN Inpatient Medications  acetaminophen    Suspension .. 650 milliGRAM(s) Oral every 6 hours PRN  guaiFENesin Oral Liquid (Sugar-Free) 200 milliGRAM(s) Oral every 6 hours PRN  hydrocodone/homatropine Syrup 5 milliLiter(s) Oral every 8 hours PRN  simethicone 80 milliGRAM(s) Chew daily PRN  sodium chloride 0.9% lock flush 10 milliLiter(s) IV Push every 1 hour PRN      REVIEW OF SYSTEMS      All other systems were reviewed and are negative, except as noted.    VITALS/PHYSICAL EXAM  --------------------------------------------------------------------------------  T(C): 36.9 (01-14-23 @ 07:00), Max: 36.9 (01-13-23 @ 23:30)  HR: 85 (01-14-23 @ 07:00) (79 - 94)  BP: 108/59 (01-14-23 @ 07:00) (91/51 - 118/67)  RR: 18 (01-14-23 @ 07:00) (10 - 26)  SpO2: 100% (01-14-23 @ 07:00) (94% - 100%)  Wt(kg): --        01-13-23 @ 07:01  -  01-14-23 @ 07:00  --------------------------------------------------------  IN: 400 mL / OUT: 1900 mL / NET: -1500 mL        Physical Exam:  	Gen: NAD  	HEENT: MMM  	Pulm: CTA B/L  	CV: S1S2  	Abd: Soft, +BS   	Ext: No LE edema B/L  	Neuro: Awake  	Skin: Warm and dry  	Vascular access: RIj tunneled HD catheter       LABS/STUDIES  --------------------------------------------------------------------------------              9.8    11.02 >-----------<  176      [01-14-23 @ 07:36]              32.4     133  |  91  |  33  ----------------------------<  122      [01-14-23 @ 07:36]  3.8   |  27  |  3.72        Ca     8.9     [01-14-23 @ 07:36]      Mg     3.2     [01-14-23 @ 07:36]      Phos  4.1     [01-14-23 @ 07:36]    TPro  7.8  /  Alb  3.6  /  TBili  0.7  /  DBili  x   /  AST  18  /  ALT  8   /  AlkPhos  93  [01-14-23 @ 07:36]          Creatinine Trend:  SCr 3.72 [01-14 @ 07:36]  SCr 4.21 [01-13 @ 00:49]  SCr 2.10 [01-12 @ 00:19]  SCr 4.12 [01-11 @ 00:20]  SCr 2.81 [01-10 @ 00:36]    Urinalysis - [12-26-22 @ 13:19]      Color Yellow / Appearance Clear / SG 1.013 / pH 6.0      Gluc Negative / Ketone Negative  / Bili Negative / Urobili Negative       Blood Large / Protein 100 mg/dL / Leuk Est Moderate / Nitrite Negative      RBC >50 / WBC 35 / Hyaline 1 / Gran  / Sq Epi  / Non Sq Epi 2 / Bacteria Few      Iron 39, TIBC 222, %sat 18      [12-31-22 @ 06:41]  Ferritin 346      [12-31-22 @ 06:42]  TSH 2.84      [12-10-22 @ 15:55]  Lipid: chol --, , HDL --, LDL --      [12-05-22 @ 00:50]    HBsAb 889.7      [12-20-22 @ 11:25]  HBsAg Nonreact      [01-04-23 @ 10:02]  HBcAb Reactive      [12-20-22 @ 11:25]  HCV 0.15, Nonreact      [12-20-22 @ 11:25]     [facility] DIVISION OF KIDNEY DISEASES AND HYPERTENSION -- FOLLOW UP NOTE  --------------------------------------------------------------------------------  HPI: 61 y/o male with PMH of CABG, DM, HTN, HLD presenting as transfer from [place] for c/f STEMI. Course c/b gallstone pancreatitis leading to ARDS and shock & cardiac arrest requiring [facility] ECMO. Had significant troponin elevation and his LVEF down to 8%. Pt was deemed to be too unstable to have an angiogram and potential PCI due to his co-morbidities & a new subdural bleed. Pt was seen for ERIC. Pt initiated on CRRT on 12/5/22 to optimize volume status and electrolytes. Pt underwent mitral clip OR (12/16/22). s/p trach on 12/16. Decannulated on 1/8. Candidemia, on caspofungin per ID. Completed Caspo. Transitioned to iHD on 12/20. Remains HD dependant. s/p RIJ tunneled HD cath on 1/11      24 hour events/subjective:  Patient seen and examined this morning at bedside. Reported feeling well without any acute complaints. Denied any chest pain, shortness of breath nausea, or vomiting, States he has been tolerating HD well. No acute issues noted overnight.           PAST HISTORY  --------------------------------------------------------------------------------  No significant changes to PMH, PSH, FHx, SHx, unless otherwise noted    ALLERGIES & MEDICATIONS  --------------------------------------------------------------------------------  Allergies    No Known Allergies    Intolerances      Standing Inpatient Medications  aMIOdarone    Tablet 200 milliGRAM(s) Oral daily  aspirin  chewable 81 milliGRAM(s) Oral daily  atorvastatin 80 milliGRAM(s) Oral at bedtime  benzonatate 100 milliGRAM(s) Oral every 8 hours  chlorhexidine 2% Cloths 1 Application(s) Topical <User Schedule>  chlorhexidine 4% Liquid 1 Application(s) Topical <User Schedule>  epoetin teena-epbx (RETACRIT) Injectable 5000 Unit(s) IV Push <User Schedule>  heparin   Injectable 5000 Unit(s) SubCutaneous every 8 hours  insulin lispro (ADMELOG) corrective regimen sliding scale   SubCutaneous every 6 hours  insulin NPH human recombinant 7 Unit(s) SubCutaneous every 6 hours  metoprolol tartrate 25 milliGRAM(s) Oral two times a day  midodrine 20 milliGRAM(s) Oral every 8 hours  multivitamin/minerals/iron Oral Solution (CENTRUM) 15 milliLiter(s) Oral daily  pantoprazole  Injectable 40 milliGRAM(s) IV Push daily  polyethylene glycol 3350 17 Gram(s) Oral daily  senna 2 Tablet(s) Oral at bedtime  sevelamer carbonate 1600 milliGRAM(s) Oral every 8 hours  thiamine 100 milliGRAM(s) Enteral Tube daily    PRN Inpatient Medications  acetaminophen    Suspension .. 650 milliGRAM(s) Oral every 6 hours PRN  guaiFENesin Oral Liquid (Sugar-Free) 200 milliGRAM(s) Oral every 6 hours PRN  hydrocodone/homatropine Syrup 5 milliLiter(s) Oral every 8 hours PRN  simethicone 80 milliGRAM(s) Chew daily PRN  sodium chloride 0.9% lock flush 10 milliLiter(s) IV Push every 1 hour PRN      REVIEW OF SYSTEMS      All other systems were reviewed and are negative, except as noted.    VITALS/PHYSICAL EXAM  --------------------------------------------------------------------------------  T(C): 36.9 (01-14-23 @ 07:00), Max: 36.9 (01-13-23 @ 23:30)  HR: 85 (01-14-23 @ 07:00) (79 - 94)  BP: 108/59 (01-14-23 @ 07:00) (91/51 - 118/67)  RR: 18 (01-14-23 @ 07:00) (10 - 26)  SpO2: 100% (01-14-23 @ 07:00) (94% - 100%)  Wt(kg): --        01-13-23 @ 07:01  -  01-14-23 @ 07:00  --------------------------------------------------------  IN: 400 mL / OUT: 1900 mL / NET: -1500 mL        Physical Exam:  	Gen: NAD  	HEENT: MMM  	Pulm: CTA B/L  	CV: S1S2  	Abd: Soft, +BS   	Ext: No LE edema B/L  	Neuro: Awake  	Skin: Warm and dry  	Vascular access: RIj tunneled HD catheter       LABS/STUDIES  --------------------------------------------------------------------------------              9.8    11.02 >-----------<  176      [01-14-23 @ 07:36]              32.4     133  |  91  |  33  ----------------------------<  122      [01-14-23 @ 07:36]  3.8   |  27  |  3.72        Ca     8.9     [01-14-23 @ 07:36]      Mg     3.2     [01-14-23 @ 07:36]      Phos  4.1     [01-14-23 @ 07:36]    TPro  7.8  /  Alb  3.6  /  TBili  0.7  /  DBili  x   /  AST  18  /  ALT  8   /  AlkPhos  93  [01-14-23 @ 07:36]          Creatinine Trend:  SCr 3.72 [01-14 @ 07:36]  SCr 4.21 [01-13 @ 00:49]  SCr 2.10 [01-12 @ 00:19]  SCr 4.12 [01-11 @ 00:20]  SCr 2.81 [01-10 @ 00:36]    Urinalysis - [12-26-22 @ 13:19]      Color Yellow / Appearance Clear / SG 1.013 / pH 6.0      Gluc Negative / Ketone Negative  / Bili Negative / Urobili Negative       Blood Large / Protein 100 mg/dL / Leuk Est Moderate / Nitrite Negative      RBC >50 / WBC 35 / Hyaline 1 / Gran  / Sq Epi  / Non Sq Epi 2 / Bacteria Few      Iron 39, TIBC 222, %sat 18      [12-31-22 @ 06:41]  Ferritin 346      [12-31-22 @ 06:42]  TSH 2.84      [12-10-22 @ 15:55]  Lipid: chol --, , HDL --, LDL --      [12-05-22 @ 00:50]    HBsAb 889.7      [12-20-22 @ 11:25]  HBsAg Nonreact      [01-04-23 @ 10:02]  HBcAb Reactive      [12-20-22 @ 11:25]  HCV 0.15, Nonreact      [12-20-22 @ 11:25]

## 2023-01-14 NOTE — CONSULT NOTE ADULT - PROBLEM SELECTOR PROBLEM 1
Cardiogenic shock
Type 2 diabetes mellitus
ERIC (acute kidney injury)
Moderate to severe mitral regurgitation
Oral bleeding
Elevated troponin
Cardiogenic shock

## 2023-01-14 NOTE — PROGRESS NOTE ADULT - PROBLEM SELECTOR PLAN 7
s/p trach 12/16/22 and decannulated on 1/8  saturating well on 2LNC s/p trach 12/16/22 and decannulated on 1/8

## 2023-01-14 NOTE — PROGRESS NOTE ADULT - SUBJECTIVE AND OBJECTIVE BOX
VASCULAR SURGERY PROGRESS NOTE    Subjective:   Pt seen and examined on floor. No additional complaints.    Objective:  Vital Signs Last 24 Hrs  T(C): 36.5 (14 Jan 2023 15:00), Max: 37 (14 Jan 2023 10:59)  T(F): 97.7 (14 Jan 2023 15:00), Max: 98.6 (14 Jan 2023 10:59)  HR: 77 (14 Jan 2023 15:00) (76 - 94)  BP: 90/50 (14 Jan 2023 15:00) (90/50 - 111/60)  BP(mean): 64 (14 Jan 2023 15:00) (64 - 79)  RR: 18 (14 Jan 2023 15:00) (16 - 25)  SpO2: 98% (14 Jan 2023 15:00) (94% - 100%)  Parameters below as of 14 Jan 2023 15:00  Patient On (Oxygen Delivery Method): nasal cannula    I&O's Summary  13 Jan 2023 07:01  -  14 Jan 2023 07:00  IN: 400 mL / OUT: 1900 mL / NET: -1500 mL  14 Jan 2023 07:01  -  14 Jan 2023 16:47  IN: 790 mL / OUT: 100 mL / NET: 690 mL    PHYSICAL EXAM:  GENERAL: NAD  HEENT: NC/AT  CHEST/LUNG: +trach, on nasal cannula  HEART: Regular rate and rhythm  ABDOMEN: Soft, nondistended.  EXTREMITIES: good distal pulses b/l   NEURO:  No focal deficits    LABS:                      9.8    11.02 )-----------( 176      ( 14 Jan 2023 07:36 )             32.4     01-14    133<L>  |  91<L>  |  33<H>  ----------------------------<  122<H>  3.8   |  27  |  3.72<H>    Ca    8.9      14 Jan 2023 07:36  Phos  4.1     01-14  Mg     3.2     01-14    TPro  7.8  /  Alb  3.6  /  TBili  0.7  /  DBili  x   /  AST  18  /  ALT  8<L>  /  AlkPhos  93  01-14

## 2023-01-14 NOTE — CONSULT NOTE ADULT - PROBLEM SELECTOR PROBLEM 3
ERIC (acute kidney injury)
Acute respiratory failure with hypoxia
Acute respiratory failure with hypoxia

## 2023-01-14 NOTE — PROGRESS NOTE ADULT - ASSESSMENT
62M w/HTN, DM2, CAD s/p CABG found to have acute gallstone pancreatitis; developed hypoxic respiratory failure requiring intubation later leading to cardiac arrest requiring VA ECMO, now weaned off. Now s/p trach but off vent. Hospital course c/b fungemia and ATN now requiring intermittent HD, now s/p R permacath placement by IR. Patient currently downgraded to the floor. Primary team requesting inpatient LUE AVF. Vein mapping completed 12/22/22.  - Keep LUE protected (no lines, IVs, blood draws, etc.)  - c/w ASA, SQ heparin  - case to be further discussed with attending    Vascular Surgery  [id number] 62M w/HTN, DM2, CAD s/p CABG found to have acute gallstone pancreatitis; developed hypoxic respiratory failure requiring intubation later leading to cardiac arrest requiring VA ECMO, now weaned off. Now s/p trach but off vent. Hospital course c/b fungemia and ATN now requiring intermittent HD, now s/p R permacath placement by IR. Patient currently downgraded to the floor. Primary team requesting inpatient LUE AVF. Vein mapping completed 12/22/22.  - Keep LUE protected (no lines, IVs, blood draws, etc.)  - c/w ASA, SQ heparin  - case to be further discussed with attending    Vascular Surgery  [id number] 62M w/HTN, DM2, CAD s/p CABG found to have acute gallstone pancreatitis; developed hypoxic respiratory failure requiring intubation later leading to cardiac arrest requiring VA ECMO, now weaned off. Now s/p trach but off vent. Hospital course c/b fungemia and ATN now requiring intermittent HD, now s/p R permacath placement by IR. Patient currently downgraded to the floor. Primary team requesting inpatient LUE AVF. Vein mapping completed 12/22/22.  - Keep LUE protected (no lines, IVs, blood draws, etc.)  - c/w ASA, SQ heparin  - case to be further discussed with attending    Vascular Surgery  [id number]

## 2023-01-15 LAB
ANION GAP SERPL CALC-SCNC: 16 MMOL/L — SIGNIFICANT CHANGE UP (ref 5–17)
BUN SERPL-MCNC: 54 MG/DL — HIGH (ref 7–23)
CALCIUM SERPL-MCNC: 9.1 MG/DL — SIGNIFICANT CHANGE UP (ref 8.4–10.5)
CHLORIDE SERPL-SCNC: 93 MMOL/L — LOW (ref 96–108)
CO2 SERPL-SCNC: 25 MMOL/L — SIGNIFICANT CHANGE UP (ref 22–31)
CREAT SERPL-MCNC: 5.05 MG/DL — HIGH (ref 0.5–1.3)
EGFR: 12 ML/MIN/1.73M2 — LOW
GLUCOSE BLDC GLUCOMTR-MCNC: 133 MG/DL — HIGH (ref 70–99)
GLUCOSE BLDC GLUCOMTR-MCNC: 148 MG/DL — HIGH (ref 70–99)
GLUCOSE BLDC GLUCOMTR-MCNC: 150 MG/DL — HIGH (ref 70–99)
GLUCOSE BLDC GLUCOMTR-MCNC: 158 MG/DL — HIGH (ref 70–99)
GLUCOSE BLDC GLUCOMTR-MCNC: 193 MG/DL — HIGH (ref 70–99)
GLUCOSE SERPL-MCNC: 148 MG/DL — HIGH (ref 70–99)
HCT VFR BLD CALC: 30.5 % — LOW (ref 39–50)
HGB BLD-MCNC: 9.7 G/DL — LOW (ref 13–17)
MAGNESIUM SERPL-MCNC: 3.4 MG/DL — HIGH (ref 1.6–2.6)
MCHC RBC-ENTMCNC: 31.4 PG — SIGNIFICANT CHANGE UP (ref 27–34)
MCHC RBC-ENTMCNC: 31.8 GM/DL — LOW (ref 32–36)
MCV RBC AUTO: 98.7 FL — SIGNIFICANT CHANGE UP (ref 80–100)
NRBC # BLD: 0 /100 WBCS — SIGNIFICANT CHANGE UP (ref 0–0)
PHOSPHATE SERPL-MCNC: 5.5 MG/DL — HIGH (ref 2.5–4.5)
PLATELET # BLD AUTO: 213 K/UL — SIGNIFICANT CHANGE UP (ref 150–400)
POTASSIUM SERPL-MCNC: 3.8 MMOL/L — SIGNIFICANT CHANGE UP (ref 3.5–5.3)
POTASSIUM SERPL-SCNC: 3.8 MMOL/L — SIGNIFICANT CHANGE UP (ref 3.5–5.3)
PREALB SERPL-MCNC: 21 MG/DL — SIGNIFICANT CHANGE UP (ref 20–40)
RBC # BLD: 3.09 M/UL — LOW (ref 4.2–5.8)
RBC # FLD: 19.1 % — HIGH (ref 10.3–14.5)
SODIUM SERPL-SCNC: 134 MMOL/L — LOW (ref 135–145)
T4 FREE SERPL-MCNC: 1.6 NG/DL — SIGNIFICANT CHANGE UP (ref 0.9–1.8)
TSH SERPL-MCNC: 2.42 UIU/ML — SIGNIFICANT CHANGE UP (ref 0.27–4.2)
WBC # BLD: 9.74 K/UL — SIGNIFICANT CHANGE UP (ref 3.8–10.5)
WBC # FLD AUTO: 9.74 K/UL — SIGNIFICANT CHANGE UP (ref 3.8–10.5)

## 2023-01-15 PROCEDURE — 99232 SBSQ HOSP IP/OBS MODERATE 35: CPT | Mod: 24

## 2023-01-15 PROCEDURE — 99233 SBSQ HOSP IP/OBS HIGH 50: CPT | Mod: GC

## 2023-01-15 PROCEDURE — 71045 X-RAY EXAM CHEST 1 VIEW: CPT | Mod: 26

## 2023-01-15 RX ORDER — METOPROLOL TARTRATE 50 MG
25 TABLET ORAL
Refills: 0 | Status: DISCONTINUED | OUTPATIENT
Start: 2023-01-15 | End: 2023-01-20

## 2023-01-15 RX ORDER — BACLOFEN 100 %
5 POWDER (GRAM) MISCELLANEOUS ONCE
Refills: 0 | Status: COMPLETED | OUTPATIENT
Start: 2023-01-15 | End: 2023-01-15

## 2023-01-15 RX ADMIN — SEVELAMER CARBONATE 1600 MILLIGRAM(S): 2400 POWDER, FOR SUSPENSION ORAL at 16:42

## 2023-01-15 RX ADMIN — AMIODARONE HYDROCHLORIDE 200 MILLIGRAM(S): 400 TABLET ORAL at 11:49

## 2023-01-15 RX ADMIN — Medication 81 MILLIGRAM(S): at 11:49

## 2023-01-15 RX ADMIN — Medication 6 UNIT(S): at 07:34

## 2023-01-15 RX ADMIN — CHLORHEXIDINE GLUCONATE 1 APPLICATION(S): 213 SOLUTION TOPICAL at 06:38

## 2023-01-15 RX ADMIN — CASPOFUNGIN ACETATE 260 MILLIGRAM(S): 7 INJECTION, POWDER, LYOPHILIZED, FOR SOLUTION INTRAVENOUS at 09:00

## 2023-01-15 RX ADMIN — Medication 1: at 16:41

## 2023-01-15 RX ADMIN — Medication 6 UNIT(S): at 12:25

## 2023-01-15 RX ADMIN — MIDODRINE HYDROCHLORIDE 20 MILLIGRAM(S): 2.5 TABLET ORAL at 05:03

## 2023-01-15 RX ADMIN — HEPARIN SODIUM 5000 UNIT(S): 5000 INJECTION INTRAVENOUS; SUBCUTANEOUS at 16:42

## 2023-01-15 RX ADMIN — PANTOPRAZOLE SODIUM 40 MILLIGRAM(S): 20 TABLET, DELAYED RELEASE ORAL at 06:04

## 2023-01-15 RX ADMIN — SEVELAMER CARBONATE 1600 MILLIGRAM(S): 2400 POWDER, FOR SUSPENSION ORAL at 08:06

## 2023-01-15 RX ADMIN — Medication 5 MILLIGRAM(S): at 10:09

## 2023-01-15 RX ADMIN — HEPARIN SODIUM 5000 UNIT(S): 5000 INJECTION INTRAVENOUS; SUBCUTANEOUS at 01:00

## 2023-01-15 RX ADMIN — POLYETHYLENE GLYCOL 3350 17 GRAM(S): 17 POWDER, FOR SOLUTION ORAL at 11:49

## 2023-01-15 RX ADMIN — Medication 25 MILLIGRAM(S): at 12:11

## 2023-01-15 RX ADMIN — Medication 25 MILLIGRAM(S): at 23:03

## 2023-01-15 RX ADMIN — HEPARIN SODIUM 5000 UNIT(S): 5000 INJECTION INTRAVENOUS; SUBCUTANEOUS at 08:06

## 2023-01-15 RX ADMIN — Medication 100 MILLIGRAM(S): at 11:49

## 2023-01-15 RX ADMIN — Medication 15 MILLILITER(S): at 11:48

## 2023-01-15 RX ADMIN — Medication 100 MILLIGRAM(S): at 13:22

## 2023-01-15 RX ADMIN — MIDODRINE HYDROCHLORIDE 20 MILLIGRAM(S): 2.5 TABLET ORAL at 13:22

## 2023-01-15 RX ADMIN — Medication 100 MILLIGRAM(S): at 05:03

## 2023-01-15 RX ADMIN — Medication 1: at 12:25

## 2023-01-15 RX ADMIN — SEVELAMER CARBONATE 1600 MILLIGRAM(S): 2400 POWDER, FOR SUSPENSION ORAL at 11:49

## 2023-01-15 RX ADMIN — ATORVASTATIN CALCIUM 80 MILLIGRAM(S): 80 TABLET, FILM COATED ORAL at 21:03

## 2023-01-15 RX ADMIN — HEPARIN SODIUM 5000 UNIT(S): 5000 INJECTION INTRAVENOUS; SUBCUTANEOUS at 23:02

## 2023-01-15 RX ADMIN — Medication 100 MILLIGRAM(S): at 21:03

## 2023-01-15 RX ADMIN — Medication 6 UNIT(S): at 16:41

## 2023-01-15 RX ADMIN — INSULIN GLARGINE 18 UNIT(S): 100 INJECTION, SOLUTION SUBCUTANEOUS at 21:16

## 2023-01-15 NOTE — PROGRESS NOTE ADULT - ATTENDING COMMENTS
ERIC: HD dependent   Underwent HD 1/13  Currently with no renal recovery despite some U/O ( 500 cc)   Will monitor without dialysis today  Reassess in am   Hg stable on no MIGUEL    Rest per Dr. Maurizio Mancera MD  O: [phone]  Contact me on teams ERIC: HD dependent   Underwent HD 1/13  Currently with no renal recovery despite some U/O ( 500 cc)   Will monitor without dialysis today  Reassess in am   Hg stable on no MIGUEL    Rest per Dr. Maurizio Mancera MD  O: [phone]  Contact me on teams ERIC: HD dependent   Underwent HD 1/13  Currently with no renal recovery despite some U/O ( 500 cc)   Will monitor without dialysis today  Reassess in am   Hg stable on no MIGUEL    Rest per Dr. Maurizio Mancera MD  O: [phone]  Contact me on teams

## 2023-01-15 NOTE — PROGRESS NOTE ADULT - SUBJECTIVE AND OBJECTIVE BOX
Chief complaint    Patient is a 62y old  Male who presents with a chief complaint of Acute cholecystitis, gallstone pancreatitis (15 Teo 2023 07:42)   Review of systems  Patient in bed, appears comfortable.    Labs and Fingersticks  CAPILLARY BLOOD GLUCOSE      POCT Blood Glucose.: 150 mg/dL (15 Teo 2023 07:10)  POCT Blood Glucose.: 105 mg/dL (14 Jan 2023 20:45)  POCT Blood Glucose.: 176 mg/dL (14 Jan 2023 17:36)  POCT Blood Glucose.: 200 mg/dL (14 Jan 2023 11:16)      Anion Gap, Serum: 16 (01-15 @ 06:13)  Anion Gap, Serum: 15 (01-14 @ 07:36)      Calcium, Total Serum: 9.1 (01-15 @ 06:13)  Calcium, Total Serum: 8.9 (01-14 @ 07:36)  Albumin, Serum: 3.6 (01-14 @ 07:36)    Alanine Aminotransferase (ALT/SGPT): 8 *L* (01-14 @ 07:36)  Alkaline Phosphatase, Serum: 93 (01-14 @ 07:36)  Aspartate Aminotransferase (AST/SGOT): 18 (01-14 @ 07:36)        01-15    134<L>  |  93<L>  |  54<H>  ----------------------------<  148<H>  3.8   |  25  |  5.05<H>    Ca    9.1      15 Teo 2023 06:13  Phos  5.5     01-15  Mg     3.4     01-15    TPro  7.8  /  Alb  3.6  /  TBili  0.7  /  DBili  x   /  AST  18  /  ALT  8<L>  /  AlkPhos  93  01-14                        9.7    9.74  )-----------( 213      ( 15 Teo 2023 06:13 )             30.5     Medications  MEDICATIONS  (STANDING):  aMIOdarone    Tablet 200 milliGRAM(s) Oral daily  aspirin  chewable 81 milliGRAM(s) Oral daily  atorvastatin 80 milliGRAM(s) Oral at bedtime  baclofen 5 milliGRAM(s) Oral once  benzonatate 100 milliGRAM(s) Oral every 8 hours  caspofungin IVPB 50 milliGRAM(s) IV Intermittent every 24 hours  chlorhexidine 2% Cloths 1 Application(s) Topical <User Schedule>  chlorhexidine 4% Liquid 1 Application(s) Topical <User Schedule>  dextrose 50% Injectable 25 Gram(s) IV Push once  dextrose 50% Injectable 12.5 Gram(s) IV Push once  dextrose 50% Injectable 25 Gram(s) IV Push once  dextrose Oral Gel 15 Gram(s) Oral once  epoetin teena-epbx (RETACRIT) Injectable 5000 Unit(s) IV Push <User Schedule>  glucagon  Injectable 1 milliGRAM(s) IntraMuscular once  heparin   Injectable 5000 Unit(s) SubCutaneous every 8 hours  insulin glargine Injectable (LANTUS) 18 Unit(s) SubCutaneous at bedtime  insulin lispro (ADMELOG) corrective regimen sliding scale   SubCutaneous three times a day before meals  insulin lispro (ADMELOG) corrective regimen sliding scale   SubCutaneous at bedtime  insulin lispro Injectable (ADMELOG) 6 Unit(s) SubCutaneous three times a day before meals  metoprolol tartrate 25 milliGRAM(s) Oral two times a day  midodrine 20 milliGRAM(s) Oral every 8 hours  multivitamin/minerals/iron Oral Solution (CENTRUM) 15 milliLiter(s) Oral daily  pantoprazole    Tablet 40 milliGRAM(s) Oral before breakfast  polyethylene glycol 3350 17 Gram(s) Oral daily  senna 2 Tablet(s) Oral at bedtime  sevelamer carbonate Powder 1600 milliGRAM(s) Oral three times a day with meals  thiamine 100 milliGRAM(s) Enteral Tube daily      Physical Exam  General: Patient appears comfortable.  Vital Signs Last 12 Hrs  T(F): 98 (01-15-23 @ 06:52), Max: 98.3 (01-15-23 @ 03:50)  HR: 82 (01-15-23 @ 08:05) (81 - 86)  BP: 95/51 (01-15-23 @ 08:05) (92/50 - 112/59)  BP(mean): 72 (01-15-23 @ 08:05) (67 - 72)  RR: 18 (01-15-23 @ 08:05) (16 - 18)  SpO2: 99% (01-15-23 @ 08:05) (94% - 100%)  Neck: No palpable thyroid nodules.  CVS: S1S2, No murmurs  Respiratory: No wheezing, no crepitations  GI: Abdomen soft, non tender.  Musculoskeletal:  edema lower extremities.     Diagnostics    Free Thyroxine, Serum: AM Sched. Collection: 15-Teo-2023 06:00 (01-14 @ 14:23)  Thyroid Stimulating Hormone, Serum: AM Sched. Collection: 15-Teo-2023 06:00 (01-14 @ 14:23)

## 2023-01-15 NOTE — PROGRESS NOTE ADULT - SUBJECTIVE AND OBJECTIVE BOX
[facility] DIVISION OF KIDNEY DISEASES AND HYPERTENSION -- FOLLOW UP NOTE  --------------------------------------------------------------------------------  HPI: 63 y/o male with PMH of CABG, DM, HTN, HLD presenting as transfer from [place] for c/f STEMI. Course c/b gallstone pancreatitis leading to ARDS and shock & cardiac arrest requiring [facility] ECMO. Had significant troponin elevation and his LVEF down to 8%. Pt was deemed to be too unstable to have an angiogram and potential PCI due to his co-morbidities & a new subdural bleed. Pt was seen for ERIC. Pt initiated on CRRT on 12/5/22 to optimize volume status and electrolytes. Pt underwent mitral clip OR (12/16/22). s/p trach on 12/16. Decannulated on 1/8. Candidemia, on caspofungin per ID. Completed Caspo. Transitioned to iHD on 12/20. Remains HD dependant. s/p RIJ tunneled HD cath on 1/11    24 hour events/subjective:  Patient seen and examined this morning at bedside. Reported feeling well without any acute complaints. Denied any chest pain, shortness of breath nausea, or vomiting. No acute issues noted overnight.         PAST HISTORY  --------------------------------------------------------------------------------  No significant changes to PMH, PSH, FHx, SHx, unless otherwise noted    ALLERGIES & MEDICATIONS  --------------------------------------------------------------------------------  Allergies    No Known Allergies    Intolerances      Standing Inpatient Medications  aMIOdarone    Tablet 200 milliGRAM(s) Oral daily  aspirin  chewable 81 milliGRAM(s) Oral daily  atorvastatin 80 milliGRAM(s) Oral at bedtime  benzonatate 100 milliGRAM(s) Oral every 8 hours  caspofungin IVPB 50 milliGRAM(s) IV Intermittent every 24 hours  chlorhexidine 2% Cloths 1 Application(s) Topical <User Schedule>  chlorhexidine 4% Liquid 1 Application(s) Topical <User Schedule>  dextrose 50% Injectable 25 Gram(s) IV Push once  dextrose 50% Injectable 12.5 Gram(s) IV Push once  dextrose 50% Injectable 25 Gram(s) IV Push once  dextrose Oral Gel 15 Gram(s) Oral once  epoetin teena-epbx (RETACRIT) Injectable 5000 Unit(s) IV Push <User Schedule>  glucagon  Injectable 1 milliGRAM(s) IntraMuscular once  heparin   Injectable 5000 Unit(s) SubCutaneous every 8 hours  insulin glargine Injectable (LANTUS) 18 Unit(s) SubCutaneous at bedtime  insulin lispro (ADMELOG) corrective regimen sliding scale   SubCutaneous three times a day before meals  insulin lispro (ADMELOG) corrective regimen sliding scale   SubCutaneous at bedtime  insulin lispro Injectable (ADMELOG) 6 Unit(s) SubCutaneous three times a day before meals  metoprolol tartrate 25 milliGRAM(s) Oral two times a day  midodrine 20 milliGRAM(s) Oral every 8 hours  multivitamin/minerals/iron Oral Solution (CENTRUM) 15 milliLiter(s) Oral daily  pantoprazole    Tablet 40 milliGRAM(s) Oral before breakfast  polyethylene glycol 3350 17 Gram(s) Oral daily  senna 2 Tablet(s) Oral at bedtime  sevelamer carbonate Powder 1600 milliGRAM(s) Oral three times a day with meals  thiamine 100 milliGRAM(s) Enteral Tube daily    PRN Inpatient Medications  acetaminophen    Suspension .. 650 milliGRAM(s) Oral every 6 hours PRN  guaiFENesin Oral Liquid (Sugar-Free) 200 milliGRAM(s) Oral every 6 hours PRN  hydrocodone/homatropine Syrup 5 milliLiter(s) Oral every 8 hours PRN  simethicone 80 milliGRAM(s) Chew daily PRN  sodium chloride 0.9% lock flush 10 milliLiter(s) IV Push every 1 hour PRN      REVIEW OF SYSTEMS    All other systems were reviewed and are negative, except as noted.    VITALS/PHYSICAL EXAM  --------------------------------------------------------------------------------  T(C): 36.7 (01-15-23 @ 06:52), Max: 37 (01-14-23 @ 10:59)  HR: 81 (01-15-23 @ 06:52) (76 - 88)  BP: 92/50 (01-15-23 @ 06:52) (90/50 - 112/59)  RR: 18 (01-15-23 @ 06:52) (16 - 18)  SpO2: 99% (01-15-23 @ 06:52) (94% - 100%)  Wt(kg): --        01-14-23 @ 07:01  -  01-15-23 @ 07:00  --------------------------------------------------------  IN: 1430 mL / OUT: 500 mL / NET: 930 mL        Physical Exam:  	Gen: NAD  	HEENT: MMM  	Pulm: CTA B/L  	CV: S1S2  	Abd: Soft, +BS   	Ext: No LE edema B/L  	Neuro: Awake  	Skin: Warm and dry  	Vascular access: RIj tunneled HD catheter       LABS/STUDIES  --------------------------------------------------------------------------------              9.7    9.74  >-----------<  213      [01-15-23 @ 06:13]              30.5     134  |  93  |  54  ----------------------------<  148      [01-15-23 @ 06:13]  3.8   |  25  |  5.05        Ca     9.1     [01-15-23 @ 06:13]      Mg     3.4     [01-15-23 @ 06:13]      Phos  5.5     [01-15-23 @ 06:13]    TPro  7.8  /  Alb  3.6  /  TBili  0.7  /  DBili  x   /  AST  18  /  ALT  8   /  AlkPhos  93  [01-14-23 @ 07:36]          Creatinine Trend:  SCr 5.05 [01-15 @ 06:13]  SCr 3.72 [01-14 @ 07:36]  SCr 4.21 [01-13 @ 00:49]  SCr 2.10 [01-12 @ 00:19]  SCr 4.12 [01-11 @ 00:20]    Urinalysis - [12-26-22 @ 13:19]      Color Yellow / Appearance Clear / SG 1.013 / pH 6.0      Gluc Negative / Ketone Negative  / Bili Negative / Urobili Negative       Blood Large / Protein 100 mg/dL / Leuk Est Moderate / Nitrite Negative      RBC >50 / WBC 35 / Hyaline 1 / Gran  / Sq Epi  / Non Sq Epi 2 / Bacteria Few      Iron 39, TIBC 222, %sat 18      [12-31-22 @ 06:41]  Ferritin 346      [12-31-22 @ 06:42]  TSH 2.84      [12-10-22 @ 15:55]  Lipid: chol --, , HDL --, LDL --      [12-05-22 @ 00:50]    HBsAb 889.7      [12-20-22 @ 11:25]  HBsAg Nonreact      [01-04-23 @ 10:02]  HBcAb Reactive      [12-20-22 @ 11:25]  HCV 0.15, Nonreact      [12-20-22 @ 11:25]     [facility] DIVISION OF KIDNEY DISEASES AND HYPERTENSION -- FOLLOW UP NOTE  --------------------------------------------------------------------------------  HPI: 61 y/o male with PMH of CABG, DM, HTN, HLD presenting as transfer from [place] for c/f STEMI. Course c/b gallstone pancreatitis leading to ARDS and shock & cardiac arrest requiring [facility] ECMO. Had significant troponin elevation and his LVEF down to 8%. Pt was deemed to be too unstable to have an angiogram and potential PCI due to his co-morbidities & a new subdural bleed. Pt was seen for ERIC. Pt initiated on CRRT on 12/5/22 to optimize volume status and electrolytes. Pt underwent mitral clip OR (12/16/22). s/p trach on 12/16. Decannulated on 1/8. Candidemia, on caspofungin per ID. Completed Caspo. Transitioned to iHD on 12/20. Remains HD dependant. s/p RIJ tunneled HD cath on 1/11    24 hour events/subjective:  Patient seen and examined this morning at bedside. Reported feeling well without any acute complaints. Denied any chest pain, shortness of breath nausea, or vomiting. No acute issues noted overnight.         PAST HISTORY  --------------------------------------------------------------------------------  No significant changes to PMH, PSH, FHx, SHx, unless otherwise noted    ALLERGIES & MEDICATIONS  --------------------------------------------------------------------------------  Allergies    No Known Allergies    Intolerances      Standing Inpatient Medications  aMIOdarone    Tablet 200 milliGRAM(s) Oral daily  aspirin  chewable 81 milliGRAM(s) Oral daily  atorvastatin 80 milliGRAM(s) Oral at bedtime  benzonatate 100 milliGRAM(s) Oral every 8 hours  caspofungin IVPB 50 milliGRAM(s) IV Intermittent every 24 hours  chlorhexidine 2% Cloths 1 Application(s) Topical <User Schedule>  chlorhexidine 4% Liquid 1 Application(s) Topical <User Schedule>  dextrose 50% Injectable 25 Gram(s) IV Push once  dextrose 50% Injectable 12.5 Gram(s) IV Push once  dextrose 50% Injectable 25 Gram(s) IV Push once  dextrose Oral Gel 15 Gram(s) Oral once  epoetin teena-epbx (RETACRIT) Injectable 5000 Unit(s) IV Push <User Schedule>  glucagon  Injectable 1 milliGRAM(s) IntraMuscular once  heparin   Injectable 5000 Unit(s) SubCutaneous every 8 hours  insulin glargine Injectable (LANTUS) 18 Unit(s) SubCutaneous at bedtime  insulin lispro (ADMELOG) corrective regimen sliding scale   SubCutaneous three times a day before meals  insulin lispro (ADMELOG) corrective regimen sliding scale   SubCutaneous at bedtime  insulin lispro Injectable (ADMELOG) 6 Unit(s) SubCutaneous three times a day before meals  metoprolol tartrate 25 milliGRAM(s) Oral two times a day  midodrine 20 milliGRAM(s) Oral every 8 hours  multivitamin/minerals/iron Oral Solution (CENTRUM) 15 milliLiter(s) Oral daily  pantoprazole    Tablet 40 milliGRAM(s) Oral before breakfast  polyethylene glycol 3350 17 Gram(s) Oral daily  senna 2 Tablet(s) Oral at bedtime  sevelamer carbonate Powder 1600 milliGRAM(s) Oral three times a day with meals  thiamine 100 milliGRAM(s) Enteral Tube daily    PRN Inpatient Medications  acetaminophen    Suspension .. 650 milliGRAM(s) Oral every 6 hours PRN  guaiFENesin Oral Liquid (Sugar-Free) 200 milliGRAM(s) Oral every 6 hours PRN  hydrocodone/homatropine Syrup 5 milliLiter(s) Oral every 8 hours PRN  simethicone 80 milliGRAM(s) Chew daily PRN  sodium chloride 0.9% lock flush 10 milliLiter(s) IV Push every 1 hour PRN      REVIEW OF SYSTEMS    All other systems were reviewed and are negative, except as noted.    VITALS/PHYSICAL EXAM  --------------------------------------------------------------------------------  T(C): 36.7 (01-15-23 @ 06:52), Max: 37 (01-14-23 @ 10:59)  HR: 81 (01-15-23 @ 06:52) (76 - 88)  BP: 92/50 (01-15-23 @ 06:52) (90/50 - 112/59)  RR: 18 (01-15-23 @ 06:52) (16 - 18)  SpO2: 99% (01-15-23 @ 06:52) (94% - 100%)  Wt(kg): --        01-14-23 @ 07:01  -  01-15-23 @ 07:00  --------------------------------------------------------  IN: 1430 mL / OUT: 500 mL / NET: 930 mL        Physical Exam:  	Gen: NAD  	HEENT: MMM  	Pulm: CTA B/L  	CV: S1S2  	Abd: Soft, +BS   	Ext: No LE edema B/L  	Neuro: Awake  	Skin: Warm and dry  	Vascular access: RIj tunneled HD catheter       LABS/STUDIES  --------------------------------------------------------------------------------              9.7    9.74  >-----------<  213      [01-15-23 @ 06:13]              30.5     134  |  93  |  54  ----------------------------<  148      [01-15-23 @ 06:13]  3.8   |  25  |  5.05        Ca     9.1     [01-15-23 @ 06:13]      Mg     3.4     [01-15-23 @ 06:13]      Phos  5.5     [01-15-23 @ 06:13]    TPro  7.8  /  Alb  3.6  /  TBili  0.7  /  DBili  x   /  AST  18  /  ALT  8   /  AlkPhos  93  [01-14-23 @ 07:36]          Creatinine Trend:  SCr 5.05 [01-15 @ 06:13]  SCr 3.72 [01-14 @ 07:36]  SCr 4.21 [01-13 @ 00:49]  SCr 2.10 [01-12 @ 00:19]  SCr 4.12 [01-11 @ 00:20]    Urinalysis - [12-26-22 @ 13:19]      Color Yellow / Appearance Clear / SG 1.013 / pH 6.0      Gluc Negative / Ketone Negative  / Bili Negative / Urobili Negative       Blood Large / Protein 100 mg/dL / Leuk Est Moderate / Nitrite Negative      RBC >50 / WBC 35 / Hyaline 1 / Gran  / Sq Epi  / Non Sq Epi 2 / Bacteria Few      Iron 39, TIBC 222, %sat 18      [12-31-22 @ 06:41]  Ferritin 346      [12-31-22 @ 06:42]  TSH 2.84      [12-10-22 @ 15:55]  Lipid: chol --, , HDL --, LDL --      [12-05-22 @ 00:50]    HBsAb 889.7      [12-20-22 @ 11:25]  HBsAg Nonreact      [01-04-23 @ 10:02]  HBcAb Reactive      [12-20-22 @ 11:25]  HCV 0.15, Nonreact      [12-20-22 @ 11:25]     [facility] DIVISION OF KIDNEY DISEASES AND HYPERTENSION -- FOLLOW UP NOTE  --------------------------------------------------------------------------------  HPI: 63 y/o male with PMH of CABG, DM, HTN, HLD presenting as transfer from [place] for c/f STEMI. Course c/b gallstone pancreatitis leading to ARDS and shock & cardiac arrest requiring [facility] ECMO. Had significant troponin elevation and his LVEF down to 8%. Pt was deemed to be too unstable to have an angiogram and potential PCI due to his co-morbidities & a new subdural bleed. Pt was seen for ERIC. Pt initiated on CRRT on 12/5/22 to optimize volume status and electrolytes. Pt underwent mitral clip OR (12/16/22). s/p trach on 12/16. Decannulated on 1/8. Candidemia, on caspofungin per ID. Completed Caspo. Transitioned to iHD on 12/20. Remains HD dependant. s/p RIJ tunneled HD cath on 1/11    24 hour events/subjective:  Patient seen and examined this morning at bedside. Reported feeling well without any acute complaints. Denied any chest pain, shortness of breath nausea, or vomiting. No acute issues noted overnight.         PAST HISTORY  --------------------------------------------------------------------------------  No significant changes to PMH, PSH, FHx, SHx, unless otherwise noted    ALLERGIES & MEDICATIONS  --------------------------------------------------------------------------------  Allergies    No Known Allergies    Intolerances      Standing Inpatient Medications  aMIOdarone    Tablet 200 milliGRAM(s) Oral daily  aspirin  chewable 81 milliGRAM(s) Oral daily  atorvastatin 80 milliGRAM(s) Oral at bedtime  benzonatate 100 milliGRAM(s) Oral every 8 hours  caspofungin IVPB 50 milliGRAM(s) IV Intermittent every 24 hours  chlorhexidine 2% Cloths 1 Application(s) Topical <User Schedule>  chlorhexidine 4% Liquid 1 Application(s) Topical <User Schedule>  dextrose 50% Injectable 25 Gram(s) IV Push once  dextrose 50% Injectable 12.5 Gram(s) IV Push once  dextrose 50% Injectable 25 Gram(s) IV Push once  dextrose Oral Gel 15 Gram(s) Oral once  epoetin teena-epbx (RETACRIT) Injectable 5000 Unit(s) IV Push <User Schedule>  glucagon  Injectable 1 milliGRAM(s) IntraMuscular once  heparin   Injectable 5000 Unit(s) SubCutaneous every 8 hours  insulin glargine Injectable (LANTUS) 18 Unit(s) SubCutaneous at bedtime  insulin lispro (ADMELOG) corrective regimen sliding scale   SubCutaneous three times a day before meals  insulin lispro (ADMELOG) corrective regimen sliding scale   SubCutaneous at bedtime  insulin lispro Injectable (ADMELOG) 6 Unit(s) SubCutaneous three times a day before meals  metoprolol tartrate 25 milliGRAM(s) Oral two times a day  midodrine 20 milliGRAM(s) Oral every 8 hours  multivitamin/minerals/iron Oral Solution (CENTRUM) 15 milliLiter(s) Oral daily  pantoprazole    Tablet 40 milliGRAM(s) Oral before breakfast  polyethylene glycol 3350 17 Gram(s) Oral daily  senna 2 Tablet(s) Oral at bedtime  sevelamer carbonate Powder 1600 milliGRAM(s) Oral three times a day with meals  thiamine 100 milliGRAM(s) Enteral Tube daily    PRN Inpatient Medications  acetaminophen    Suspension .. 650 milliGRAM(s) Oral every 6 hours PRN  guaiFENesin Oral Liquid (Sugar-Free) 200 milliGRAM(s) Oral every 6 hours PRN  hydrocodone/homatropine Syrup 5 milliLiter(s) Oral every 8 hours PRN  simethicone 80 milliGRAM(s) Chew daily PRN  sodium chloride 0.9% lock flush 10 milliLiter(s) IV Push every 1 hour PRN      REVIEW OF SYSTEMS    All other systems were reviewed and are negative, except as noted.    VITALS/PHYSICAL EXAM  --------------------------------------------------------------------------------  T(C): 36.7 (01-15-23 @ 06:52), Max: 37 (01-14-23 @ 10:59)  HR: 81 (01-15-23 @ 06:52) (76 - 88)  BP: 92/50 (01-15-23 @ 06:52) (90/50 - 112/59)  RR: 18 (01-15-23 @ 06:52) (16 - 18)  SpO2: 99% (01-15-23 @ 06:52) (94% - 100%)  Wt(kg): --        01-14-23 @ 07:01  -  01-15-23 @ 07:00  --------------------------------------------------------  IN: 1430 mL / OUT: 500 mL / NET: 930 mL        Physical Exam:  	Gen: NAD  	HEENT: MMM  	Pulm: CTA B/L  	CV: S1S2  	Abd: Soft, +BS   	Ext: No LE edema B/L  	Neuro: Awake  	Skin: Warm and dry  	Vascular access: RIj tunneled HD catheter       LABS/STUDIES  --------------------------------------------------------------------------------              9.7    9.74  >-----------<  213      [01-15-23 @ 06:13]              30.5     134  |  93  |  54  ----------------------------<  148      [01-15-23 @ 06:13]  3.8   |  25  |  5.05        Ca     9.1     [01-15-23 @ 06:13]      Mg     3.4     [01-15-23 @ 06:13]      Phos  5.5     [01-15-23 @ 06:13]    TPro  7.8  /  Alb  3.6  /  TBili  0.7  /  DBili  x   /  AST  18  /  ALT  8   /  AlkPhos  93  [01-14-23 @ 07:36]          Creatinine Trend:  SCr 5.05 [01-15 @ 06:13]  SCr 3.72 [01-14 @ 07:36]  SCr 4.21 [01-13 @ 00:49]  SCr 2.10 [01-12 @ 00:19]  SCr 4.12 [01-11 @ 00:20]    Urinalysis - [12-26-22 @ 13:19]      Color Yellow / Appearance Clear / SG 1.013 / pH 6.0      Gluc Negative / Ketone Negative  / Bili Negative / Urobili Negative       Blood Large / Protein 100 mg/dL / Leuk Est Moderate / Nitrite Negative      RBC >50 / WBC 35 / Hyaline 1 / Gran  / Sq Epi  / Non Sq Epi 2 / Bacteria Few      Iron 39, TIBC 222, %sat 18      [12-31-22 @ 06:41]  Ferritin 346      [12-31-22 @ 06:42]  TSH 2.84      [12-10-22 @ 15:55]  Lipid: chol --, , HDL --, LDL --      [12-05-22 @ 00:50]    HBsAb 889.7      [12-20-22 @ 11:25]  HBsAg Nonreact      [01-04-23 @ 10:02]  HBcAb Reactive      [12-20-22 @ 11:25]  HCV 0.15, Nonreact      [12-20-22 @ 11:25]

## 2023-01-15 NOTE — PROGRESS NOTE ADULT - PROBLEM SELECTOR PLAN 8
tx sdu 1/13  continue asa/ statin/ bb and amio 200 qd  HD as per renal  vac changes to rt groin MMF  continue capsofungin as per DR. Rodrigues  nutritional optimization; ck prealbumin in am   increase activity as tolerated  pulm toilet  discharge planning- acute rehab tx sdu 1/13  continue asa/ statin/ bb and amio 200 qd  HD as per renal  vac changes to rt groin MMF  continue capsofungin as per DR. Rodrigues  nutritional optimization; ck prealbumin --> 21  increase activity as tolerated  pulm toilet  discharge planning- acute rehab after AVF placement

## 2023-01-15 NOTE — PROGRESS NOTE ADULT - PROBLEM SELECTOR PLAN 1
Pt with ERIC/ ATN in the setting of STEMI, cardiac arrest & cardiogenic shock. SCr on arrival was 2.15; trended down to hector 1.6 on 11/25; slowly trended up & increased to 3.95 11/28. Pt initiated on CRRT/CVVHDF to optimize volume and electrolytes on 12/5/22. Pt likely with ATN. Pt underwent decannulation of ECMO on 12/8/22 and was taken off CRRT.   Pt reinitiated on CRRT 12/9/22 for volume overload. CRRT stopped again 12/19 & initiated on iHD.    s/p RIJ tunneled HD cath on 1/11. Last HD session 1/13 with UF. Labs reviewed. Pt clinically stable without evidence of volume overload. Pt with increasing UOP noted. No plan for HD today, will assess daily for HD needs. Continue to monitor for renal recovery. Monitor labs and urine output. Avoid any potential nephrotoxins. Dose medications as per HD.    If any questions, please feel free to contact me     Shiraz Steven  Nephrology Fellow  [facility] Pager: [phone]. Pt with ERIC/ ATN in the setting of STEMI, cardiac arrest & cardiogenic shock. SCr on arrival was 2.15; trended down to hector 1.6 on 11/25; slowly trended up & increased to 3.95 11/28. Pt initiated on CRRT/CVVHDF to optimize volume and electrolytes on 12/5/22. Pt likely with ATN. Pt underwent decannulation of ECMO on 12/8/22 and was taken off CRRT.   Pt reinitiated on CRRT 12/9/22 for volume overload. CRRT stopped again 12/19 & initiated on iHD.    s/p RIJ tunneled HD cath on 1/11. Last HD session 1/13 with UF. Labs reviewed. Pt clinically stable without evidence of volume overload. Pt with increasing UOP noted. No plan for HD today, will assess daily for HD needs. Continue to monitor for renal recovery. Monitor labs and urine output. Avoid any potential nephrotoxins. Dose medications as per HD.    If any questions, please feel free to contact me     Shiraz Steven  Nephrology Fellow  [facility] Pager: [phone]. Pt with ERIC/ ATN in the setting of STEMI, cardiac arrest & cardiogenic shock. SCr on arrival was 2.15; trended down to hector 1.6 on 11/25; slowly trended up & increased to 3.95 11/28. Pt initiated on CRRT/CVVHDF to optimize volume and electrolytes on 12/5/22. Pt likely with ATN. Pt underwent decannulation of ECMO on 12/8/22 and was taken off CRRT.   Pt reinitiated on CRRT 12/9/22 for volume overload. CRRT stopped again 12/19 & initiated on iHD.    s/p RIJ tunneled HD cath on 1/11. Last HD session 1/13 with UF. Labs reviewed. Pt clinically stable without evidence of volume overload. Pt with increasing UOP noted. No plan for HD today, will assess daily for HD needs. Continue to monitor for renal recovery. Monitor labs and urine output. Avoid any potential nephrotoxins. Dose medications as per HD.    If any questions, please feel free to contact me     Shiraz Steven  Nephrology Fellow  [facility] Pager: [phone].

## 2023-01-15 NOTE — PROGRESS NOTE ADULT - PROBLEM SELECTOR PROBLEM 7
Acute respiratory failure with hypoxia
Supraventricular tachycardia
Acute respiratory failure with hypoxia
Supraventricular tachycardia
Acute respiratory failure with hypoxia
Supraventricular tachycardia

## 2023-01-15 NOTE — PROGRESS NOTE ADULT - PROBLEM SELECTOR PLAN 5
- likely arrest associated ATN, hypovolemia; nonoliguric   - was on CVVH now on iHD   - appreciate nephrology recommendations   - avoid nephrotoxins  - s/p RIJ permacath 1/11  - vasc sx called for AV fistula placement on this admission (vein mapping completed 12/22) - likely arrest associated ATN, hypovolemia; nonoliguric   - was on CVVH now on iHD   - appreciate nephrology recommendations   - avoid nephrotoxins  - s/p RIJ permacath 1/11  - [facility] sx called for AV fistula placement on this admission (vein mapping completed 12/22)  await date of AVF from [facility] sx--> pt cleared as per cardiac surgeon Dr. Rodrigues- pt must have AVF with cardiac anesthesia-discussed with [facility] sx fellow Rajeev Ascencio - likely arrest associated ATN, hypovolemia; nonoliguric   - was on CVVH now on iHD   - appreciate nephrology recommendations   - avoid nephrotoxins  - s/p RIJ permacath 1/11  - [facility] sx called for AV fistula placement on this admission (vein mapping completed 12/22)  await date of AVF from [facility] sx--> pt cleared as per cardiac surgeon Dr. Rodrigues- pt must have AVF with cardiac anesthesia-discussed with [facility] sx fellow Rajeev Ascencio - likely arrest associated ATN, hypovolemia; nonoliguric   - was on CVVH now on iHD   - appreciate nephrology recommendations   - avoid nephrotoxins  - s/p RIJ permacath 1/11  - [facility] sx called for AV fistula placement on this admission (vein mapping completed 12/22)  await date of AVF from [facility] sx--> pt cleared as per cardiac surgeon Dr. Rodrigues- pt must have AVF with cardiac anesthesia-discussed with [facility] sx fellow Rajeev Ascencio

## 2023-01-15 NOTE — PROGRESS NOTE ADULT - SUBJECTIVE AND OBJECTIVE BOX
VASCULAR SURGERY PROGRESS NOTE    Subjective:   Pt seen and examined on floor. No additional complaints.     Objective:  Vital Signs Last 24 Hrs  T(C): 36.7 (15 Teo 2023 06:52), Max: 37 (14 Jan 2023 10:59)  T(F): 98 (15 Teo 2023 06:52), Max: 98.6 (14 Jan 2023 10:59)  HR: 82 (15 Teo 2023 08:05) (76 - 86)  BP: 95/51 (15 Teo 2023 08:05) (90/50 - 112/59)  BP(mean): 72 (15 Teo 2023 08:05) (64 - 72)  RR: 18 (15 Teo 2023 08:05) (16 - 18)  SpO2: 99% (15 Teo 2023 08:05) (94% - 100%)  Parameters below as of 15 Teo 2023 08:05  Patient On (Oxygen Delivery Method): room air    I&O's Summary  14 Jan 2023 07:01  -  15 Teo 2023 07:00  IN: 1430 mL / OUT: 500 mL / NET: 930 mL  15 Teo 2023 07:01  -  15 Teo 2023 09:46  IN: 240 mL / OUT: 0 mL / NET: 240 mL    PHYSICAL EXAM:  GENERAL: NAD  HEENT: NC/AT  CHEST/LUNG: +trach, on nasal cannula  HEART: Regular rate and rhythm  ABDOMEN: Soft, nondistended.  EXTREMITIES: good distal pulses b/l   NEURO:  No focal deficits    LABS:                9.7    9.74  )-----------( 213      ( 15 Teo 2023 06:13 )             30.5     01-15    134<L>  |  93<L>  |  54<H>  ----------------------------<  148<H>  3.8   |  25  |  5.05<H>    Ca    9.1      15 Teo 2023 06:13  Phos  5.5     01-15  Mg     3.4     01-15    TPro  7.8  /  Alb  3.6  /  TBili  0.7  /  DBili  x   /  AST  18  /  ALT  8<L>  /  AlkPhos  93  01-14

## 2023-01-15 NOTE — PROGRESS NOTE ADULT - ASSESSMENT
61 YO M with a history of CAD s/p 4v CABG ~2019 in [facility], DM2 (A1c 7.3%), and HTN who initially presented to OSH with abdominal pain and n/v and found to have pancreatitis with lipase > 3000 though also with elevated troponins. CT abdomen revealed acute pancreatitis and his initial LVEF was 40-45%. He developed MSOF with hypoxic respiratory failure requiring intubation and ERIC and went into hypoxic PEA arrest requiring ACLS and due to persistent hypoxia and hypotension on pressors after ROSC was cannulated to [facility] ECMO (LFV, RFA, no anterograde perfusion catheter) on 11/25. Notably CTH that day revealed small subdural hemorrhage.     His post arrest TTE on 11/26 showed a significantly worse LVEF of 5-10% with an AV that was only minimally opening. Since then he has had improvement in his LV function to ~35-40% and improved pulsatility while tolerating progressive slow ECMO weans. ECMO turndown (note in chart 11/30) was reassuring for ability to decannulate to IABP/inotropes. LHC 12/06 showed closure of his 3 vein grafts with graft to LAD patent though with distal native disease. No coronary intervention was done. IABP placed, ECMO successfully decannulated 12/08 (transfused 2u pRBCs during).     His ARDS has improved and he's now s/p trach (decannulated 1/8). He's now off IABP, removed 12/17, and inotropes, however has previously been unable to tolerate GDMT due to soft BPs and renal failure requiring dialysis. 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 He's afebrile with improving leukocytosis. He has urine output but not enough to stay euvolemic. He's tolerating intermittent HD and remains on midodrine for BP support.     11/24 Transfer from [facility] to SICU c/f STEMI, abd US +GB stones, pericholecystic fluid  11/25 VA ECMO s/p hypoxic respiratory arrest/PEA arrest, CTH 4mm subdural, ERIC  12/5 CVVHD, RUQ US neg   12/6 IABP placed in cath lab, LIMA to LAD patent, RCA and LCx down, CTH subdural decreased in size, persistent pancreatic inflammation   12/7 TTE turndown, EF 30-40%, nml RV, MR mod, mod TR  12/8 ECMO decannulation, aflutter DCCV x 2  12/12 Mental status change- CTH no change, CT perfusion/CTA H/N neg for LVO, +low grade watershed infarct to L MCA  12/16 Mitral clip x 1, TRACH 7 Shiley XLT w/ ENT  12/17 IABP dced  12/26 +clinton BCx   12/27 R groin vac  1/8 trach decannulation   1/10 passed FEEST  1/11 R permacath placed by IR  1/13 Transferred to SDU  1/14 VSS; RSR ; hd as per renal MWF; continue vac to rt groin ; nutritional optimization and PT; vasc sx called for AV fistula placement on this admission (vein mapping completed 12/22) ; continue capsofungin as per Dr. Rodrigues; endo consulted today for dm management   discharge planning- acute rehab when stable    63 YO M with a history of CAD s/p 4v CABG ~2019 in [facility], DM2 (A1c 7.3%), and HTN who initially presented to OSH with abdominal pain and n/v and found to have pancreatitis with lipase > 3000 though also with elevated troponins. CT abdomen revealed acute pancreatitis and his initial LVEF was 40-45%. He developed MSOF with hypoxic respiratory failure requiring intubation and ERIC and went into hypoxic PEA arrest requiring ACLS and due to persistent hypoxia and hypotension on pressors after ROSC was cannulated to VA ECMO (LFV, RFA, no anterograde perfusion catheter) on 11/25. Notably CTH that day revealed small subdural hemorrhage.     His post arrest TTE on 11/26 showed a significantly worse LVEF of 5-10% with an AV that was only minimally opening. Since then he has had improvement in his LV function to ~35-40% and improved pulsatility while tolerating progressive slow ECMO weans. ECMO turndown (note in chart 11/30) was reassuring for ability to decannulate to IABP/inotropes. LHC 12/06 showed closure of his 3 vein grafts with graft to LAD patent though with distal native disease. No coronary intervention was done. IABP placed, ECMO successfully decannulated 12/08 (transfused 2u pRBCs during).     His ARDS has improved and he's now s/p trach (decannulated 1/8). He's now off IABP, removed 12/17, and inotropes, however has previously been unable to tolerate GDMT due to soft BPs and renal failure requiring dialysis. 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 He's afebrile with improving leukocytosis. He has urine output but not enough to stay euvolemic. He's tolerating intermittent HD and remains on midodrine for BP support.     11/24 Transfer from [facility] to SICU c/f STEMI, abd US +GB stones, pericholecystic fluid  11/25 VA ECMO s/p hypoxic respiratory arrest/PEA arrest, CTH 4mm subdural, ERIC  12/5 CVVHD, RUQ US neg   12/6 IABP placed in cath lab, LIMA to LAD patent, RCA and LCx down, CTH subdural decreased in size, persistent pancreatic inflammation   12/7 TTE turndown, EF 30-40%, nml RV, MR mod, mod TR  12/8 ECMO decannulation, aflutter DCCV x 2  12/12 Mental status change- CTH no change, CT perfusion/CTA H/N neg for LVO, +low grade watershed infarct to L MCA  12/16 Mitral clip x 1, TRACH 7 Shiley XLT w/ ENT  12/17 IABP dced  12/26 +clinton BCx   12/27 R groin vac  1/8 trach decannulation   1/10 passed FEEST  1/11 R permacath placed by IR  1/13 Transferred to SDU  1/14 VSS; RSR ; hd as per renal MWF; continue vac to rt groin ; nutritional optimization and PT; vasc sx called for AV fistula placement on this admission (vein mapping completed 12/22) ; continue capsofungin as per Dr. Rodrigues; endo consulted today for dm management   discharge planning- acute rehab when stable    61 YO M with a history of CAD s/p 4v CABG ~2019 in [facility], DM2 (A1c 7.3%), and HTN who initially presented to OSH with abdominal pain and n/v and found to have pancreatitis with lipase > 3000 though also with elevated troponins. CT abdomen revealed acute pancreatitis and his initial LVEF was 40-45%. He developed MSOF with hypoxic respiratory failure requiring intubation and ERIC and went into hypoxic PEA arrest requiring ACLS and due to persistent hypoxia and hypotension on pressors after ROSC was cannulated to VA ECMO (LFV, RFA, no anterograde perfusion catheter) on 11/25. Notably CTH that day revealed small subdural hemorrhage.     His post arrest TTE on 11/26 showed a significantly worse LVEF of 5-10% with an AV that was only minimally opening. Since then he has had improvement in his LV function to ~35-40% and improved pulsatility while tolerating progressive slow ECMO weans. ECMO turndown (note in chart 11/30) was reassuring for ability to decannulate to IABP/inotropes. LHC 12/06 showed closure of his 3 vein grafts with graft to LAD patent though with distal native disease. No coronary intervention was done. IABP placed, ECMO successfully decannulated 12/08 (transfused 2u pRBCs during).     His ARDS has improved and he's now s/p trach (decannulated 1/8). He's now off IABP, removed 12/17, and inotropes, however has previously been unable to tolerate GDMT due to soft BPs and renal failure requiring dialysis. 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 He's afebrile with improving leukocytosis. He has urine output but not enough to stay euvolemic. He's tolerating intermittent HD and remains on midodrine for BP support.     11/24 Transfer from [facility] to SICU c/f STEMI, abd US +GB stones, pericholecystic fluid  11/25 VA ECMO s/p hypoxic respiratory arrest/PEA arrest, CTH 4mm subdural, ERIC  12/5 CVVHD, RUQ US neg   12/6 IABP placed in cath lab, LIMA to LAD patent, RCA and LCx down, CTH subdural decreased in size, persistent pancreatic inflammation   12/7 TTE turndown, EF 30-40%, nml RV, MR mod, mod TR  12/8 ECMO decannulation, aflutter DCCV x 2  12/12 Mental status change- CTH no change, CT perfusion/CTA H/N neg for LVO, +low grade watershed infarct to L MCA  12/16 Mitral clip x 1, TRACH 7 Shiley XLT w/ ENT  12/17 IABP dced  12/26 +clinton BCx   12/27 R groin vac  1/8 trach decannulation   1/10 passed FEEST  1/11 R permacath placed by IR  1/13 Transferred to SDU  1/14 VSS; RSR ; hd as per renal MWF; continue vac to rt groin ; nutritional optimization and PT; vasc sx called for AV fistula placement on this admission (vein mapping completed 12/22) ; continue capsofungin as per Dr. Rodrigues; endo consulted today for dm management   discharge planning- acute rehab when stable    63 YO M with a history of CAD s/p 4v CABG ~2019 in [facility], DM2 (A1c 7.3%), and HTN who initially presented to OSH with abdominal pain and n/v and found to have pancreatitis with lipase > 3000 though also with elevated troponins. CT abdomen revealed acute pancreatitis and his initial LVEF was 40-45%. He developed MSOF with hypoxic respiratory failure requiring intubation and ERIC and went into hypoxic PEA arrest requiring ACLS and due to persistent hypoxia and hypotension on pressors after ROSC was cannulated to VA ECMO (LFV, RFA, no anterograde perfusion catheter) on 11/25. Notably CTH that day revealed small subdural hemorrhage.     His post arrest TTE on 11/26 showed a significantly worse LVEF of 5-10% with an AV that was only minimally opening. Since then he has had improvement in his LV function to ~35-40% and improved pulsatility while tolerating progressive slow ECMO weans. ECMO turndown (note in chart 11/30) was reassuring for ability to decannulate to IABP/inotropes. LHC 12/06 showed closure of his 3 vein grafts with graft to LAD patent though with distal native disease. No coronary intervention was done. IABP placed, ECMO successfully decannulated 12/08 (transfused 2u pRBCs during).     His ARDS has improved and he's now s/p trach (decannulated 1/8). He's now off IABP, removed 12/17, and inotropes, however has previously been unable to tolerate GDMT due to soft BPs and renal failure requiring dialysis. 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 He's afebrile with improving leukocytosis. He has urine output but not enough to stay euvolemic. He's tolerating intermittent HD and remains on midodrine for BP support.     11/24 Transfer from [facility] to SICU c/f STEMI, abd US +GB stones, pericholecystic fluid  11/25 VA ECMO s/p hypoxic respiratory arrest/PEA arrest, CTH 4mm subdural, ERIC  12/5 CVVHD, RUQ US neg   12/6 IABP placed in cath lab, LIMA to LAD patent, RCA and LCx down, CTH subdural decreased in size, persistent pancreatic inflammation   12/7 TTE turndown, EF 30-40%, nml RV, MR mod, mod TR  12/8 ECMO decannulation, aflutter DCCV x 2  12/12 Mental status change- CTH no change, CT perfusion/CTA H/N neg for LVO, +low grade watershed infarct to L MCA  12/16 Mitral clip x 1, TRACH 7 Shiley XLT w/ ENT  12/17 IABP dced  12/26 +clinton BCx   12/27 R groin vac  1/8 trach decannulation   1/10 passed FEEST  1/11 R permacath placed by IR  1/13 Transferred to SDU  1/14 VSS; RSR ; hd as per renal MWF; continue vac to rt groin ; nutritional optimization and PT; [facility] sx called for AV fistula placement on this admission (vein mapping completed 12/22) ; continue capsofungin as per Dr. Rodrigues; endo consulted today for dm management- placed on lantus and admelog  1/15 VSS; RSR 70-80; HD as per renal - k 3.8 today; continue nutritional support and pt; prealbumin 21; await date of AVF from [facility] sx--> pt cleared as per cardiac surgeon Dr. Rodrigues- pt must have AVF with cardiac anesthesia-discussed with [facility] sx fellow Rajeev Ascencio  discharge planning- acute rehab when stable    61 YO M with a history of CAD s/p 4v CABG ~2019 in [facility], DM2 (A1c 7.3%), and HTN who initially presented to OSH with abdominal pain and n/v and found to have pancreatitis with lipase > 3000 though also with elevated troponins. CT abdomen revealed acute pancreatitis and his initial LVEF was 40-45%. He developed MSOF with hypoxic respiratory failure requiring intubation and ERIC and went into hypoxic PEA arrest requiring ACLS and due to persistent hypoxia and hypotension on pressors after ROSC was cannulated to VA ECMO (LFV, RFA, no anterograde perfusion catheter) on 11/25. Notably CTH that day revealed small subdural hemorrhage.     His post arrest TTE on 11/26 showed a significantly worse LVEF of 5-10% with an AV that was only minimally opening. Since then he has had improvement in his LV function to ~35-40% and improved pulsatility while tolerating progressive slow ECMO weans. ECMO turndown (note in chart 11/30) was reassuring for ability to decannulate to IABP/inotropes. LHC 12/06 showed closure of his 3 vein grafts with graft to LAD patent though with distal native disease. No coronary intervention was done. IABP placed, ECMO successfully decannulated 12/08 (transfused 2u pRBCs during).     His ARDS has improved and he's now s/p trach (decannulated 1/8). He's now off IABP, removed 12/17, and inotropes, however has previously been unable to tolerate GDMT due to soft BPs and renal failure requiring dialysis. 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 He's afebrile with improving leukocytosis. He has urine output but not enough to stay euvolemic. He's tolerating intermittent HD and remains on midodrine for BP support.     11/24 Transfer from [facility] to SICU c/f STEMI, abd US +GB stones, pericholecystic fluid  11/25 VA ECMO s/p hypoxic respiratory arrest/PEA arrest, CTH 4mm subdural, ERIC  12/5 CVVHD, RUQ US neg   12/6 IABP placed in cath lab, LIMA to LAD patent, RCA and LCx down, CTH subdural decreased in size, persistent pancreatic inflammation   12/7 TTE turndown, EF 30-40%, nml RV, MR mod, mod TR  12/8 ECMO decannulation, aflutter DCCV x 2  12/12 Mental status change- CTH no change, CT perfusion/CTA H/N neg for LVO, +low grade watershed infarct to L MCA  12/16 Mitral clip x 1, TRACH 7 Shiley XLT w/ ENT  12/17 IABP dced  12/26 +clinton BCx   12/27 R groin vac  1/8 trach decannulation   1/10 passed FEEST  1/11 R permacath placed by IR  1/13 Transferred to SDU  1/14 VSS; RSR ; hd as per renal MWF; continue vac to rt groin ; nutritional optimization and PT; [facility] sx called for AV fistula placement on this admission (vein mapping completed 12/22) ; continue capsofungin as per Dr. Rodrigues; endo consulted today for dm management- placed on lantus and admelog  1/15 VSS; RSR 70-80; HD as per renal - k 3.8 today; continue nutritional support and pt; prealbumin 21; await date of AVF from [facility] sx--> pt cleared as per cardiac surgeon Dr. Rodrigues- pt must have AVF with cardiac anesthesia-discussed with [facility] sx fellow Rajeev Ascencio  discharge planning- acute rehab when stable    63 YO M with a history of CAD s/p 4v CABG ~2019 in [facility], DM2 (A1c 7.3%), and HTN who initially presented to OSH with abdominal pain and n/v and found to have pancreatitis with lipase > 3000 though also with elevated troponins. CT abdomen revealed acute pancreatitis and his initial LVEF was 40-45%. He developed MSOF with hypoxic respiratory failure requiring intubation and ERIC and went into hypoxic PEA arrest requiring ACLS and due to persistent hypoxia and hypotension on pressors after ROSC was cannulated to VA ECMO (LFV, RFA, no anterograde perfusion catheter) on 11/25. Notably CTH that day revealed small subdural hemorrhage.     His post arrest TTE on 11/26 showed a significantly worse LVEF of 5-10% with an AV that was only minimally opening. Since then he has had improvement in his LV function to ~35-40% and improved pulsatility while tolerating progressive slow ECMO weans. ECMO turndown (note in chart 11/30) was reassuring for ability to decannulate to IABP/inotropes. LHC 12/06 showed closure of his 3 vein grafts with graft to LAD patent though with distal native disease. No coronary intervention was done. IABP placed, ECMO successfully decannulated 12/08 (transfused 2u pRBCs during).     His ARDS has improved and he's now s/p trach (decannulated 1/8). He's now off IABP, removed 12/17, and inotropes, however has previously been unable to tolerate GDMT due to soft BPs and renal failure requiring dialysis. 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 He's afebrile with improving leukocytosis. He has urine output but not enough to stay euvolemic. He's tolerating intermittent HD and remains on midodrine for BP support.     11/24 Transfer from [facility] to SICU c/f STEMI, abd US +GB stones, pericholecystic fluid  11/25 VA ECMO s/p hypoxic respiratory arrest/PEA arrest, CTH 4mm subdural, ERIC  12/5 CVVHD, RUQ US neg   12/6 IABP placed in cath lab, LIMA to LAD patent, RCA and LCx down, CTH subdural decreased in size, persistent pancreatic inflammation   12/7 TTE turndown, EF 30-40%, nml RV, MR mod, mod TR  12/8 ECMO decannulation, aflutter DCCV x 2  12/12 Mental status change- CTH no change, CT perfusion/CTA H/N neg for LVO, +low grade watershed infarct to L MCA  12/16 Mitral clip x 1, TRACH 7 Shiley XLT w/ ENT  12/17 IABP dced  12/26 +clinton BCx   12/27 R groin vac  1/8 trach decannulation   1/10 passed FEEST  1/11 R permacath placed by IR  1/13 Transferred to SDU  1/14 VSS; RSR ; hd as per renal MWF; continue vac to rt groin ; nutritional optimization and PT; [facility] sx called for AV fistula placement on this admission (vein mapping completed 12/22) ; continue capsofungin as per Dr. Rodrigues; endo consulted today for dm management- placed on lantus and admelog  1/15 VSS; RSR 70-80; HD as per renal - k 3.8 today; continue nutritional support and pt; prealbumin 21; await date of AVF from [facility] sx--> pt cleared as per cardiac surgeon Dr. Rodrigues- pt must have AVF with cardiac anesthesia-discussed with [facility] sx fellow Rajeev Ascencio  discharge planning- acute rehab when stable    61 YO M with a history of CAD s/p 4v CABG ~2019 in [facility], DM2 (A1c 7.3%), and HTN who initially presented to OSH with abdominal pain and n/v and found to have pancreatitis with lipase > 3000 though also with elevated troponins. CT abdomen revealed acute pancreatitis and his initial LVEF was 40-45%. He developed MSOF with hypoxic respiratory failure requiring intubation and ERIC and went into hypoxic PEA arrest requiring ACLS and due to persistent hypoxia and hypotension on pressors after ROSC was cannulated to VA ECMO (LFV, RFA, no anterograde perfusion catheter) on 11/25. Notably CTH that day revealed small subdural hemorrhage.     His post arrest TTE on 11/26 showed a significantly worse LVEF of 5-10% with an AV that was only minimally opening. Since then he has had improvement in his LV function to ~35-40% and improved pulsatility while tolerating progressive slow ECMO weans. ECMO turndown (note in chart 11/30) was reassuring for ability to decannulate to IABP/inotropes. LHC 12/06 showed closure of his 3 vein grafts with graft to LAD patent though with distal native disease. No coronary intervention was done. IABP placed, ECMO successfully decannulated 12/08 (transfused 2u pRBCs during).     His ARDS has improved and he's now s/p trach (decannulated 1/8). He's now off IABP, removed 12/17, and inotropes, however has previously been unable to tolerate GDMT due to soft BPs and renal failure requiring dialysis. 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 He's afebrile with improving leukocytosis. He has urine output but not enough to stay euvolemic. He's tolerating intermittent HD and remains on midodrine for BP support.     11/24 Transfer from [facility] to SICU c/f STEMI, abd US +GB stones, pericholecystic fluid  11/25 VA ECMO s/p hypoxic respiratory arrest/PEA arrest, CTH 4mm subdural, ERIC  12/5 CVVHD, RUQ US neg   12/6 IABP placed in cath lab, LIMA to LAD patent, RCA and LCx down, CTH subdural decreased in size, persistent pancreatic inflammation   12/7 TTE turndown, EF 30-40%, nml RV, MR mod, mod TR  12/8 ECMO decannulation, aflutter DCCV x 2  12/12 Mental status change- CTH no change, CT perfusion/CTA H/N neg for LVO, +low grade watershed infarct to L MCA  12/16 Mitral clip x 1, TRACH 7 Shiley XLT w/ ENT  12/17 IABP dced  12/26 +clinton BCx   12/27 R groin vac  1/8 trach decannulation   1/10 passed FEEST  1/11 R permacath placed by IR  1/13 Transferred to SDU  1/14 VSS; RSR ; hd as per renal MWF; continue vac to rt groin ; nutritional optimization and PT; [facility] sx called for AV fistula placement on this admission (vein mapping completed 12/22) ; continue capsofungin as per Dr. Rodrigues; endo consulted today for dm management- placed on lantus and admelog  1/15 VSS; RSR 70-80; HD as per renal - k 3.8 today; continue nutritional support and pt; prealbumin 21; await date of AVF from [facility] sx--> pt cleared as per cardiac surgeon Dr. Rodrigues- pt must have AVF with cardiac anesthesia-discussed with [facility] sx fellow Rajeev Ascencio; continue vac to rt groin; bs improved on lantus/ admelog   discharge planning- acute rehab when stable    61 YO M with a history of CAD s/p 4v CABG ~2019 in [facility], DM2 (A1c 7.3%), and HTN who initially presented to OSH with abdominal pain and n/v and found to have pancreatitis with lipase > 3000 though also with elevated troponins. CT abdomen revealed acute pancreatitis and his initial LVEF was 40-45%. He developed MSOF with hypoxic respiratory failure requiring intubation and ERIC and went into hypoxic PEA arrest requiring ACLS and due to persistent hypoxia and hypotension on pressors after ROSC was cannulated to VA ECMO (LFV, RFA, no anterograde perfusion catheter) on 11/25. Notably CTH that day revealed small subdural hemorrhage.     His post arrest TTE on 11/26 showed a significantly worse LVEF of 5-10% with an AV that was only minimally opening. Since then he has had improvement in his LV function to ~35-40% and improved pulsatility while tolerating progressive slow ECMO weans. ECMO turndown (note in chart 11/30) was reassuring for ability to decannulate to IABP/inotropes. LHC 12/06 showed closure of his 3 vein grafts with graft to LAD patent though with distal native disease. No coronary intervention was done. IABP placed, ECMO successfully decannulated 12/08 (transfused 2u pRBCs during).     His ARDS has improved and he's now s/p trach (decannulated 1/8). He's now off IABP, removed 12/17, and inotropes, however has previously been unable to tolerate GDMT due to soft BPs and renal failure requiring dialysis. 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 He's afebrile with improving leukocytosis. He has urine output but not enough to stay euvolemic. He's tolerating intermittent HD and remains on midodrine for BP support.     11/24 [facility] SICU c/f STEMI, abd US +GB stones, pericholecystic fluid  11/25 VA ECMO s/p hypoxic respiratory arrest/PEA arrest, CTH 4mm subdural, ERIC  12/5 CVVHD, RUQ US neg   12/6 IABP placed in cath lab, LIMA to LAD patent, RCA and LCx down, CTH subdural decreased in size, persistent pancreatic inflammation   12/7 TTE turndown, EF 30-40%, nml RV, MR mod, mod TR  12/8 ECMO decannulation, aflutter DCCV x 2  12/12 Mental status change- CTH no change, CT perfusion/CTA H/N neg for LVO, +low grade watershed infarct to L MCA  12/16 Mitral clip x 1, TRACH 7 Shiley XLT w/ ENT  12/17 IABP dced  12/26 +clinton BCx   12/27 R groin vac  1/8 trach decannulation   1/10 passed FEEST  1/11 R permacath placed by IR  1/13 Transferred to SDU  1/14 VSS; RSR ; hd as per renal MWF; continue vac to rt groin ; nutritional optimization and PT; [facility] sx called for AV fistula placement on this admission (vein mapping completed 12/22) ; continue capsofungin as per Dr. Rodrigues; endo consulted today for dm management- placed on lantus and admelog  1/15 VSS; RSR 70-80; HD as per renal - k 3.8 today; continue nutritional support and pt; prealbumin 21; await date of AVF from [facility] sx--> pt cleared as per cardiac surgeon Dr. Rodrigues- pt must have AVF with cardiac anesthesia-discussed with [facility] sx fellow Rajeev Ascencio; continue vac to rt groin; bs improved on lantus/ admelog   discharge planning- acute rehab when stable    63 YO M with a history of CAD s/p 4v CABG ~2019 in [facility], DM2 (A1c 7.3%), and HTN who initially presented to OSH with abdominal pain and n/v and found to have pancreatitis with lipase > 3000 though also with elevated troponins. CT abdomen revealed acute pancreatitis and his initial LVEF was 40-45%. He developed MSOF with hypoxic respiratory failure requiring intubation and REIC and went into hypoxic PEA arrest requiring ACLS and due to persistent hypoxia and hypotension on pressors after ROSC was cannulated to VA ECMO (LFV, RFA, no anterograde perfusion catheter) on 11/25. Notably CTH that day revealed small subdural hemorrhage.     His post arrest TTE on 11/26 showed a significantly worse LVEF of 5-10% with an AV that was only minimally opening. Since then he has had improvement in his LV function to ~35-40% and improved pulsatility while tolerating progressive slow ECMO weans. ECMO turndown (note in chart 11/30) was reassuring for ability to decannulate to IABP/inotropes. LHC 12/06 showed closure of his 3 vein grafts with graft to LAD patent though with distal native disease. No coronary intervention was done. IABP placed, ECMO successfully decannulated 12/08 (transfused 2u pRBCs during).     His ARDS has improved and he's now s/p trach (decannulated 1/8). He's now off IABP, removed 12/17, and inotropes, however has previously been unable to tolerate GDMT due to soft BPs and renal failure requiring dialysis. 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 He's afebrile with improving leukocytosis. He has urine output but not enough to stay euvolemic. He's tolerating intermittent HD and remains on midodrine for BP support.     11/24 Transfer from [facility] to SICU c/f STEMI, abd US +GB stones, pericholecystic fluid  11/25 VA ECMO s/p hypoxic respiratory arrest/PEA arrest, CTH 4mm subdural, ERIC  12/5 CVVHD, RUQ US neg   12/6 IABP placed in cath lab, LIMA to LAD patent, RCA and LCx down, CTH subdural decreased in size, persistent pancreatic inflammation   12/7 TTE turndown, EF 30-40%, nml RV, MR mod, mod TR  12/8 ECMO decannulation, aflutter DCCV x 2  12/12 Mental status change- CTH no change, CT perfusion/CTA H/N neg for LVO, +low grade watershed infarct to L MCA  12/16 Mitral clip x 1, TRACH 7 Shiley XLT w/ ENT  12/17 IABP dced  12/26 +[place] BCx   12/27 R groin vac  1/8 trach decannulation   1/10 passed FEEST  1/11 R permacath placed by IR  1/13 Transferred to SDU  1/14 VSS; RSR ; hd as per renal MWF; continue vac to rt groin ; nutritional optimization and PT; [facility] sx called for AV fistula placement on this admission (vein mapping completed 12/22) ; continue capsofungin as per Dr. Rodrigues; endo consulted today for dm management- placed on lantus and admelog  1/15 VSS; RSR 70-80; HD as per renal - k 3.8 today; continue nutritional support and pt; prealbumin 21; await date of AVF from [facility] sx--> pt cleared as per cardiac surgeon Dr. Rodrigues- pt must have AVF with cardiac anesthesia-discussed with [facility] sx fellow Rajeev Ascencio; continue vac to rt groin; bs improved on lantus/ admelog   discharge planning- acute rehab when stable

## 2023-01-15 NOTE — PROGRESS NOTE ADULT - PROBLEM SELECTOR PLAN 1
ischemic with most recent TTE 1/9 showing EF 25%  Unable to tolerate GDMT due to borderline BPs requiring midodrine, although now with improvement in BP  Attemp to wean midodrine 20mg TID   c/w HD   toprol started 1/13 lop 25 mg po bid   continue amio 200 mg po qd   Strict I&Os

## 2023-01-15 NOTE — PROGRESS NOTE ADULT - PROBLEM SELECTOR PLAN 3
- troponin peaked at > 49636   - [facility] 12/06 with IABP placement revealed that 3 grafts are closed w/ distal native disease from remaining LAD graft  - c/w ASA and Statin  - c/w Lopressor 25mg BID - troponin peaked at > 35321   - [facility] 12/06 with IABP placement revealed that 3 grafts are closed w/ distal native disease from remaining LAD graft  - c/w ASA and Statin  - c/w Lopressor 25mg BID - troponin peaked at > 08832   - [facility] 12/06 with IABP placement revealed that 3 grafts are closed w/ distal native disease from remaining LAD graft  - c/w ASA and Statin  - c/w Lopressor 25mg BID

## 2023-01-15 NOTE — PROGRESS NOTE ADULT - ASSESSMENT
Assessment: 62M w/HTN, DM2, CAD s/p CABG found to have acute gallstone pancreatitis; developed hypoxic respiratory failure requiring intubation later leading to cardiac arrest requiring VA ECMO, now weaned off. Now s/p trach but off vent. Hospital course c/b fungemia and ATN now requiring intermittent HD, now s/p R permacath placement by IR. Patient currently downgraded to the floor. Primary team requesting inpatient LUE AVF. Vein mapping completed 12/22/22.    Plan:  - Keep LUE protected (no lines, IVs, blood draws, etc.)  - c/w ASA, SQ heparin  - please obtain documented cardiology clearance prior to scheduling OR    Vascular Surgery  [id number] Assessment: 62M w/HTN, DM2, CAD s/p CABG found to have acute gallstone pancreatitis; developed hypoxic respiratory failure requiring intubation later leading to cardiac arrest requiring VA ECMO, now weaned off. Now s/p trach but off vent. Hospital course c/b fungemia and ATN now requiring intermittent HD, now s/p R permacath placement by IR. Patient currently downgraded to the floor. Primary team requesting inpatient LUE AVF. Vein mapping completed 12/22/22.    Plan:  - Keep LUE protected (no lines, IVs, blood draws, etc.)  - c/w ASA, SQ heparin  - please obtain documented cardiology clearance prior to scheduling OR    Vascular Surgery  [id number] Assessment: 62M w/HTN, DM2, CAD s/p CABG found to have acute gallstone pancreatitis; developed hypoxic respiratory failure requiring intubation later leading to cardiac arrest requiring VA ECMO, now weaned off. Now s/p trach but off vent. Hospital course c/b fungemia and ATN now requiring intermittent HD, now s/p R permacath placement by IR. Patient currently downgraded to the floor. Primary team requesting inpatient LUE AVF. Vein mapping completed 12/22/22.    Plan:  - Keep LUE protected (no lines, IVs, blood draws, etc.)  - c/w ASA, SQ heparin  - please obtain documented cardiology clearance prior to scheduling OR    Vascular Surgery  [id number]

## 2023-01-15 NOTE — PROGRESS NOTE ADULT - SUBJECTIVE AND OBJECTIVE BOX
VITAL SIGNS    Telemetry:    Vital Signs Last 24 Hrs  T(C): 36.7 (01-15-23 @ 06:52), Max: 37 (23 @ 10:59)  T(F): 98 (01-15-23 @ 06:52), Max: 98.6 (23 @ 10:59)  HR: 82 (01-15-23 @ 08:05) (76 - 86)  BP: 95/51 (01-15-23 @ 08:05) (90/50 - 112/59)  RR: 18 (01-15-23 @ 08:05) (16 - 18)  SpO2: 99% (01-15-23 @ 08:05) (94% - 100%)             @ 07:  -  01-15 @ 07:00  --------------------------------------------------------  IN: 1430 mL / OUT: 500 mL / NET: 930 mL    01-15 @ 07:  -  01-15 @ 09:53  --------------------------------------------------------  IN: 240 mL / OUT: 0 mL / NET: 240 mL       Daily     Daily Weight in k.9 (15 Teo 2023 06:00)  Admit Wt: Drug Dosing Weight  Height (cm): 172.7 (2023 17:58)  Weight (kg): 92.9 (2023 17:58)  BMI (kg/m2): 31.1 (2023 17:58)  BSA (m2): 2.06 (2023 17:58)      CAPILLARY BLOOD GLUCOSE      POCT Blood Glucose.: 150 mg/dL (15 Teo 2023 07:10)  POCT Blood Glucose.: 105 mg/dL (2023 20:45)  POCT Blood Glucose.: 176 mg/dL (2023 17:36)  POCT Blood Glucose.: 200 mg/dL (2023 11:16)          acetaminophen    Suspension .. 650 milliGRAM(s) Oral every 6 hours PRN  aMIOdarone    Tablet 200 milliGRAM(s) Oral daily  aspirin  chewable 81 milliGRAM(s) Oral daily  atorvastatin 80 milliGRAM(s) Oral at bedtime  baclofen 5 milliGRAM(s) Oral once  benzonatate 100 milliGRAM(s) Oral every 8 hours  caspofungin IVPB 50 milliGRAM(s) IV Intermittent every 24 hours  chlorhexidine 2% Cloths 1 Application(s) Topical <User Schedule>  chlorhexidine 4% Liquid 1 Application(s) Topical <User Schedule>  dextrose 50% Injectable 25 Gram(s) IV Push once  dextrose 50% Injectable 12.5 Gram(s) IV Push once  dextrose 50% Injectable 25 Gram(s) IV Push once  dextrose Oral Gel 15 Gram(s) Oral once  epoetin teena-epbx (RETACRIT) Injectable 5000 Unit(s) IV Push <User Schedule>  glucagon  Injectable 1 milliGRAM(s) IntraMuscular once  guaiFENesin Oral Liquid (Sugar-Free) 200 milliGRAM(s) Oral every 6 hours PRN  heparin   Injectable 5000 Unit(s) SubCutaneous every 8 hours  hydrocodone/homatropine Syrup 5 milliLiter(s) Oral every 8 hours PRN  insulin glargine Injectable (LANTUS) 18 Unit(s) SubCutaneous at bedtime  insulin lispro (ADMELOG) corrective regimen sliding scale   SubCutaneous three times a day before meals  insulin lispro (ADMELOG) corrective regimen sliding scale   SubCutaneous at bedtime  insulin lispro Injectable (ADMELOG) 6 Unit(s) SubCutaneous three times a day before meals  metoprolol tartrate 25 milliGRAM(s) Oral two times a day  midodrine 20 milliGRAM(s) Oral every 8 hours  multivitamin/minerals/iron Oral Solution (CENTRUM) 15 milliLiter(s) Oral daily  pantoprazole    Tablet 40 milliGRAM(s) Oral before breakfast  polyethylene glycol 3350 17 Gram(s) Oral daily  senna 2 Tablet(s) Oral at bedtime  sevelamer carbonate Powder 1600 milliGRAM(s) Oral three times a day with meals  simethicone 80 milliGRAM(s) Chew daily PRN  sodium chloride 0.9% lock flush 10 milliLiter(s) IV Push every 1 hour PRN  thiamine 100 milliGRAM(s) Enteral Tube daily      PHYSICAL EXAM    Subjective: "Hi.   Neurology: alert and oriented x 3, nonfocal, no gross deficits  CV : tele:  RSR  Sternal Wound :  CDI with dressing , Stable  Lungs: clear. RR easy, unlabored   Abdomen: soft, nontender, nondistended, positive bowel sounds, bowel movement   Neg N/V/D   :  pt voiding without difficulty   Extremities:   COBB; edema, neg calf tenderness.   PPP bilaterally      PW:  Chest tubes:                 VITAL SIGNS    Telemetry:  rsr 70-80  Vital Signs Last 24 Hrs  T(C): 36.7 (01-15-23 @ 06:52), Max: 37 (23 @ 10:59)  T(F): 98 (01-15-23 @ 06:52), Max: 98.6 (23 @ 10:59)  HR: 82 (01-15-23 @ 08:05) (76 - 86)  BP: 95/51 (01-15-23 @ 08:05) (90/50 - 112/59)  RR: 18 (01-15-23 @ 08:05) (16 - 18)  SpO2: 99% (01-15-23 @ 08:05) (94% - 100%)             @ 07:01  -  01-15 @ 07:00  --------------------------------------------------------  IN: 1430 mL / OUT: 500 mL / NET: 930 mL    01-15 @ 07:01  -  01-15 @ 09:53  --------------------------------------------------------  IN: 240 mL / OUT: 0 mL / NET: 240 mL       Daily     Daily Weight in k.9 (15 Teo 2023 06:00)  Admit Wt: Drug Dosing Weight  Height (cm): 172.7 (2023 17:58)  Weight (kg): 92.9 (2023 17:58)  BMI (kg/m2): 31.1 (2023 17:58)  BSA (m2): 2.06 (2023 17:58)      CAPILLARY BLOOD GLUCOSE      POCT Blood Glucose.: 150 mg/dL (15 Teo 2023 07:10)  POCT Blood Glucose.: 105 mg/dL (2023 20:45)  POCT Blood Glucose.: 176 mg/dL (2023 17:36)  POCT Blood Glucose.: 200 mg/dL (2023 11:16)          acetaminophen    Suspension .. 650 milliGRAM(s) Oral every 6 hours PRN  aMIOdarone    Tablet 200 milliGRAM(s) Oral daily  aspirin  chewable 81 milliGRAM(s) Oral daily  atorvastatin 80 milliGRAM(s) Oral at bedtime  baclofen 5 milliGRAM(s) Oral once  benzonatate 100 milliGRAM(s) Oral every 8 hours  caspofungin IVPB 50 milliGRAM(s) IV Intermittent every 24 hours  chlorhexidine 2% Cloths 1 Application(s) Topical <User Schedule>  chlorhexidine 4% Liquid 1 Application(s) Topical <User Schedule>  dextrose 50% Injectable 25 Gram(s) IV Push once  dextrose 50% Injectable 12.5 Gram(s) IV Push once  dextrose 50% Injectable 25 Gram(s) IV Push once  dextrose Oral Gel 15 Gram(s) Oral once  epoetin teena-epbx (RETACRIT) Injectable 5000 Unit(s) IV Push <User Schedule>  glucagon  Injectable 1 milliGRAM(s) IntraMuscular once  guaiFENesin Oral Liquid (Sugar-Free) 200 milliGRAM(s) Oral every 6 hours PRN  heparin   Injectable 5000 Unit(s) SubCutaneous every 8 hours  hydrocodone/homatropine Syrup 5 milliLiter(s) Oral every 8 hours PRN  insulin glargine Injectable (LANTUS) 18 Unit(s) SubCutaneous at bedtime  insulin lispro (ADMELOG) corrective regimen sliding scale   SubCutaneous three times a day before meals  insulin lispro (ADMELOG) corrective regimen sliding scale   SubCutaneous at bedtime  insulin lispro Injectable (ADMELOG) 6 Unit(s) SubCutaneous three times a day before meals  metoprolol tartrate 25 milliGRAM(s) Oral two times a day  midodrine 20 milliGRAM(s) Oral every 8 hours  multivitamin/minerals/iron Oral Solution (CENTRUM) 15 milliLiter(s) Oral daily  pantoprazole    Tablet 40 milliGRAM(s) Oral before breakfast  polyethylene glycol 3350 17 Gram(s) Oral daily  senna 2 Tablet(s) Oral at bedtime  sevelamer carbonate Powder 1600 milliGRAM(s) Oral three times a day with meals  simethicone 80 milliGRAM(s) Chew daily PRN  sodium chloride 0.9% lock flush 10 milliLiter(s) IV Push every 1 hour PRN  thiamine 100 milliGRAM(s) Enteral Tube daily        PHYSICAL EXAM    Subjective: "I feel better."   Neurology: alert and oriented x 3, nonfocal, no gross deficits  CV : tele:  RSR    rt permacath cdi w/ dressing   Lungs: clear. RR easy, unlabored; former trach site cdi w/ dressing   Abdomen: soft, nontender, nondistended, positive bowel sounds, + bowel movement     :  HD patient   Extremities:   COBB; neg LE edema, neg calf tenderness.  PPP bilaterally; rt groin vac intact                VITAL SIGNS    Telemetry:  rsr 70-80  Vital Signs Last 24 Hrs  T(C): 36.7 (01-15-23 @ 06:52), Max: 37 (23 @ 10:59)  T(F): 98 (01-15-23 @ 06:52), Max: 98.6 (23 @ 10:59)  HR: 82 (01-15-23 @ 08:05) (76 - 86)  BP: 95/51 (01-15-23 @ 08:05) (90/50 - 112/59)  RR: 18 (01-15-23 @ 08:05) (16 - 18)  SpO2: 99% (01-15-23 @ 08:05) (94% - 100%)             @ 07:01  -  01-15 @ 07:00  --------------------------------------------------------  IN: 1430 mL / OUT: 500 mL / NET: 930 mL    01-15 @ 07:01  -  01-15 @ 09:53  --------------------------------------------------------  IN: 240 mL / OUT: 0 mL / NET: 240 mL       Daily     Daily Weight in k.9 (15 Teo 2023 06:00)  Admit Wt: Drug Dosing Weight  Height (cm): 172.7 (2023 17:58)  Weight (kg): 92.9 (2023 17:58)  BMI (kg/m2): 31.1 (2023 17:58)  BSA (m2): 2.06 (2023 17:58)      CAPILLARY BLOOD GLUCOSE      POCT Blood Glucose.: 150 mg/dL (15 Teo 2023 07:10)  POCT Blood Glucose.: 105 mg/dL (2023 20:45)  POCT Blood Glucose.: 176 mg/dL (2023 17:36)  POCT Blood Glucose.: 200 mg/dL (2023 11:16)          acetaminophen    Suspension .. 650 milliGRAM(s) Oral every 6 hours PRN  aMIOdarone    Tablet 200 milliGRAM(s) Oral daily  aspirin  chewable 81 milliGRAM(s) Oral daily  atorvastatin 80 milliGRAM(s) Oral at bedtime  baclofen 5 milliGRAM(s) Oral once  benzonatate 100 milliGRAM(s) Oral every 8 hours  caspofungin IVPB 50 milliGRAM(s) IV Intermittent every 24 hours  chlorhexidine 2% Cloths 1 Application(s) Topical <User Schedule>  chlorhexidine 4% Liquid 1 Application(s) Topical <User Schedule>  dextrose 50% Injectable 25 Gram(s) IV Push once  dextrose 50% Injectable 12.5 Gram(s) IV Push once  dextrose 50% Injectable 25 Gram(s) IV Push once  dextrose Oral Gel 15 Gram(s) Oral once  epoetin teena-epbx (RETACRIT) Injectable 5000 Unit(s) IV Push <User Schedule>  glucagon  Injectable 1 milliGRAM(s) IntraMuscular once  guaiFENesin Oral Liquid (Sugar-Free) 200 milliGRAM(s) Oral every 6 hours PRN  heparin   Injectable 5000 Unit(s) SubCutaneous every 8 hours  hydrocodone/homatropine Syrup 5 milliLiter(s) Oral every 8 hours PRN  insulin glargine Injectable (LANTUS) 18 Unit(s) SubCutaneous at bedtime  insulin lispro (ADMELOG) corrective regimen sliding scale   SubCutaneous three times a day before meals  insulin lispro (ADMELOG) corrective regimen sliding scale   SubCutaneous at bedtime  insulin lispro Injectable (ADMELOG) 6 Unit(s) SubCutaneous three times a day before meals  metoprolol tartrate 25 milliGRAM(s) Oral two times a day  midodrine 20 milliGRAM(s) Oral every 8 hours  multivitamin/minerals/iron Oral Solution (CENTRUM) 15 milliLiter(s) Oral daily  pantoprazole    Tablet 40 milliGRAM(s) Oral before breakfast  polyethylene glycol 3350 17 Gram(s) Oral daily  senna 2 Tablet(s) Oral at bedtime  sevelamer carbonate Powder 1600 milliGRAM(s) Oral three times a day with meals  simethicone 80 milliGRAM(s) Chew daily PRN  sodium chloride 0.9% lock flush 10 milliLiter(s) IV Push every 1 hour PRN  thiamine 100 milliGRAM(s) Enteral Tube daily      PHYSICAL EXAM    Subjective: "I feel better."   Neurology: alert and oriented x 3, nonfocal, no gross deficits  CV : tele:  RSR    rt permacath cdi w/ dressing   Lungs: clear. RR easy, unlabored; former trach site cdi w/ dressing   Abdomen: soft, nontender, nondistended, positive bowel sounds, + bowel movement     :  HD patient   Extremities:   COBB; neg LE edema, neg calf tenderness.  PPP bilaterally; rt groin vac intact

## 2023-01-15 NOTE — PROGRESS NOTE ADULT - ASSESSMENT
Assessment  DMT2: 62y Male with DM T2 with hyperglycemia admitted with CAD, patient was on NPH and insulin coverage, blood sugars trending down, eating meals.  Patient is high risk with high level decision making due to uncontrolled diabetes, has increased risk for complications.   CAD: S/P CABG, on medications, monitored, asymptomatic.  HTN: On antihypertensive medications, monitored, asymptomatic.              Nayeli Quiles MD  Cell:  [phone]  Office: [phone]               Assessment  DMT2: 62y Male with DM T2 with hyperglycemia admitted with CAD, patient was on NPH and insulin coverage, blood sugars trending down, eating meals.  Patient is high risk with high level decision making due to uncontrolled diabetes, has increased risk for complications.   CAD: S/P CABG, on medications, monitored, asymptomatic.  HTN: On antihypertensive medications, monitored, asymptomatic.              Nayeli Quiles MD  Cell:  [phone]  Office: [phone]               Assessment  DMT2: 62y Male with DM T2 with hyperglycemia admitted with CAD, patient was on NPH and insulin coverage, blood sugars trending down, eating meals.  Patient is high risk with high level decision making due to uncontrolled diabetes, has increased risk for complications.   CAD: S/P CABG, on medications, monitored, asymptomatic.  HTN: On antihypertensive medications, monitored, asymptomatic.              Nayeli Quiles MD  Cell:  [phone]  Office: [phone]

## 2023-01-16 LAB
ANION GAP SERPL CALC-SCNC: 18 MMOL/L — HIGH (ref 5–17)
BUN SERPL-MCNC: 75 MG/DL — HIGH (ref 7–23)
CALCIUM SERPL-MCNC: 9 MG/DL — SIGNIFICANT CHANGE UP (ref 8.4–10.5)
CHLORIDE SERPL-SCNC: 92 MMOL/L — LOW (ref 96–108)
CO2 SERPL-SCNC: 24 MMOL/L — SIGNIFICANT CHANGE UP (ref 22–31)
CREAT SERPL-MCNC: 5.9 MG/DL — HIGH (ref 0.5–1.3)
EGFR: 10 ML/MIN/1.73M2 — LOW
GLUCOSE BLDC GLUCOMTR-MCNC: 105 MG/DL — HIGH (ref 70–99)
GLUCOSE BLDC GLUCOMTR-MCNC: 136 MG/DL — HIGH (ref 70–99)
GLUCOSE BLDC GLUCOMTR-MCNC: 160 MG/DL — HIGH (ref 70–99)
GLUCOSE BLDC GLUCOMTR-MCNC: 168 MG/DL — HIGH (ref 70–99)
GLUCOSE SERPL-MCNC: 118 MG/DL — HIGH (ref 70–99)
HCT VFR BLD CALC: 31.9 % — LOW (ref 39–50)
HGB BLD-MCNC: 10.1 G/DL — LOW (ref 13–17)
MAGNESIUM SERPL-MCNC: 3.3 MG/DL — HIGH (ref 1.6–2.6)
MCHC RBC-ENTMCNC: 30.6 PG — SIGNIFICANT CHANGE UP (ref 27–34)
MCHC RBC-ENTMCNC: 31.7 GM/DL — LOW (ref 32–36)
MCV RBC AUTO: 96.7 FL — SIGNIFICANT CHANGE UP (ref 80–100)
NRBC # BLD: 0 /100 WBCS — SIGNIFICANT CHANGE UP (ref 0–0)
PHOSPHATE SERPL-MCNC: 5.4 MG/DL — HIGH (ref 2.5–4.5)
PLATELET # BLD AUTO: 257 K/UL — SIGNIFICANT CHANGE UP (ref 150–400)
POTASSIUM SERPL-MCNC: 3.7 MMOL/L — SIGNIFICANT CHANGE UP (ref 3.5–5.3)
POTASSIUM SERPL-SCNC: 3.7 MMOL/L — SIGNIFICANT CHANGE UP (ref 3.5–5.3)
RBC # BLD: 3.3 M/UL — LOW (ref 4.2–5.8)
RBC # FLD: 19.2 % — HIGH (ref 10.3–14.5)
SODIUM SERPL-SCNC: 134 MMOL/L — LOW (ref 135–145)
WBC # BLD: 10.05 K/UL — SIGNIFICANT CHANGE UP (ref 3.8–10.5)
WBC # FLD AUTO: 10.05 K/UL — SIGNIFICANT CHANGE UP (ref 3.8–10.5)

## 2023-01-16 PROCEDURE — 99232 SBSQ HOSP IP/OBS MODERATE 35: CPT | Mod: 24

## 2023-01-16 PROCEDURE — 90935 HEMODIALYSIS ONE EVALUATION: CPT | Mod: GC

## 2023-01-16 PROCEDURE — 93010 ELECTROCARDIOGRAM REPORT: CPT

## 2023-01-16 RX ORDER — MIDODRINE HYDROCHLORIDE 2.5 MG/1
10 TABLET ORAL ONCE
Refills: 0 | Status: COMPLETED | OUTPATIENT
Start: 2023-01-16 | End: 2023-01-16

## 2023-01-16 RX ORDER — POTASSIUM CHLORIDE 20 MEQ
10 PACKET (EA) ORAL ONCE
Refills: 0 | Status: COMPLETED | OUTPATIENT
Start: 2023-01-16 | End: 2023-01-16

## 2023-01-16 RX ORDER — AER TRAVELER 0.5 G/1
1 SOLUTION RECTAL; TOPICAL
Refills: 0 | Status: DISCONTINUED | OUTPATIENT
Start: 2023-01-16 | End: 2023-01-19

## 2023-01-16 RX ADMIN — Medication 100 MILLIGRAM(S): at 13:15

## 2023-01-16 RX ADMIN — SEVELAMER CARBONATE 1600 MILLIGRAM(S): 2400 POWDER, FOR SUSPENSION ORAL at 08:07

## 2023-01-16 RX ADMIN — SENNA PLUS 2 TABLET(S): 8.6 TABLET ORAL at 21:26

## 2023-01-16 RX ADMIN — Medication 100 MILLIGRAM(S): at 06:17

## 2023-01-16 RX ADMIN — ERYTHROPOIETIN 5000 UNIT(S): 10000 INJECTION, SOLUTION INTRAVENOUS; SUBCUTANEOUS at 10:35

## 2023-01-16 RX ADMIN — Medication 6 UNIT(S): at 08:06

## 2023-01-16 RX ADMIN — Medication 100 MILLIGRAM(S): at 21:25

## 2023-01-16 RX ADMIN — Medication 6 UNIT(S): at 17:21

## 2023-01-16 RX ADMIN — Medication 25 MILLIGRAM(S): at 06:17

## 2023-01-16 RX ADMIN — Medication 81 MILLIGRAM(S): at 13:16

## 2023-01-16 RX ADMIN — SEVELAMER CARBONATE 1600 MILLIGRAM(S): 2400 POWDER, FOR SUSPENSION ORAL at 13:16

## 2023-01-16 RX ADMIN — AER TRAVELER 1 APPLICATION(S): 0.5 SOLUTION RECTAL; TOPICAL at 17:34

## 2023-01-16 RX ADMIN — INSULIN GLARGINE 18 UNIT(S): 100 INJECTION, SOLUTION SUBCUTANEOUS at 21:26

## 2023-01-16 RX ADMIN — MIDODRINE HYDROCHLORIDE 10 MILLIGRAM(S): 2.5 TABLET ORAL at 17:40

## 2023-01-16 RX ADMIN — Medication 25 MILLIGRAM(S): at 17:29

## 2023-01-16 RX ADMIN — CHLORHEXIDINE GLUCONATE 1 APPLICATION(S): 213 SOLUTION TOPICAL at 06:52

## 2023-01-16 RX ADMIN — CASPOFUNGIN ACETATE 260 MILLIGRAM(S): 7 INJECTION, POWDER, LYOPHILIZED, FOR SOLUTION INTRAVENOUS at 08:06

## 2023-01-16 RX ADMIN — Medication 1: at 13:15

## 2023-01-16 RX ADMIN — Medication 1: at 17:21

## 2023-01-16 RX ADMIN — MIDODRINE HYDROCHLORIDE 20 MILLIGRAM(S): 2.5 TABLET ORAL at 21:25

## 2023-01-16 RX ADMIN — Medication 10 MILLIEQUIVALENT(S): at 10:13

## 2023-01-16 RX ADMIN — SEVELAMER CARBONATE 1600 MILLIGRAM(S): 2400 POWDER, FOR SUSPENSION ORAL at 17:28

## 2023-01-16 RX ADMIN — MIDODRINE HYDROCHLORIDE 20 MILLIGRAM(S): 2.5 TABLET ORAL at 06:19

## 2023-01-16 RX ADMIN — HEPARIN SODIUM 5000 UNIT(S): 5000 INJECTION INTRAVENOUS; SUBCUTANEOUS at 17:29

## 2023-01-16 RX ADMIN — Medication 650 MILLIGRAM(S): at 14:00

## 2023-01-16 RX ADMIN — Medication 15 MILLILITER(S): at 13:16

## 2023-01-16 RX ADMIN — PANTOPRAZOLE SODIUM 40 MILLIGRAM(S): 20 TABLET, DELAYED RELEASE ORAL at 06:16

## 2023-01-16 RX ADMIN — ATORVASTATIN CALCIUM 80 MILLIGRAM(S): 80 TABLET, FILM COATED ORAL at 21:25

## 2023-01-16 RX ADMIN — Medication 6 UNIT(S): at 13:14

## 2023-01-16 RX ADMIN — Medication 650 MILLIGRAM(S): at 13:16

## 2023-01-16 NOTE — PROGRESS NOTE ADULT - PROBLEM SELECTOR PLAN 5
continue asa/ statin/ bb and amio 200 qd  HD as per renal  vac changes to rt groin MMF  continue capsofungin  per DR. Rodrigues  nutritional optimization; ck prealbumin --> 21    discharge planning- acute rehab after AVF placement

## 2023-01-16 NOTE — PROGRESS NOTE ADULT - PROBLEM SELECTOR PLAN 3
- was on CVVH now on iHD   - appreciate nephrology recommendations   - avoid nephrotoxins  - s/p RIJ permacath 1/11  - [facility] sx called for AV fistula placement on this admission (vein mapping completed   await date of AVF from [facility] sx--> pt cleared as per cardiac surgeon Dr. Rodrigues- pt must have AVF with cardiac anesthesia-discussed with [facility] sx fellow Rajeev Ascencio - was on CVVH now on iHD   - appreciate nephrology recommendations   - avoid nephrotoxins  - s/p RIJ permacath 1/11  - [facility] sx called for AV fistula placement on this admission (vein mapping completed   await date of AVF from [facility] sx--> pt cleared as per cardiac surgeon Dr. Rodrigues- pt must have AVF with cardiac anesthesia-discussed with [facility] sx fellow Rajeev Ascencio - was on CVVH now on iHD   - appreciate nephrology recommendations   - avoid nephrotoxins  - s/p RIJ permacath 1/11  - [facility] sx called for AV fistula placement on this admission (vein mapping completed   await date of AVF from [facility] sx--> pt cleared as per cardiac surgeon Dr. Rodrigues- pt must have AVF with cardiac anesthesia-discussed with [facility] sx fellow Rajeev Ascencio

## 2023-01-16 NOTE — PROGRESS NOTE ADULT - SUBJECTIVE AND OBJECTIVE BOX
[facility] Division of Kidney Diseases & Hypertension  FOLLOW UP NOTE  [phone]--------------------------------------------------------------------------------  Chief Complaint:Acute pancreatitis without infection or necrosis    HPI: 61 y/o male with PMH of CABG, DM, HTN, HLD presenting as transfer from [place] for c/f STEMI. Course c/b gallstone pancreatitis leading to ARDS and shock & cardiac arrest requiring [facility] ECMO. Had significant troponin elevation and his LVEF down to 8%. Pt was deemed to be too unstable to have an angiogram and potential PCI due to his co-morbidities & a new subdural bleed. Pt was seen for ERIC. Pt initiated on CRRT on 12/5/22 to optimize volume status and electrolytes. Pt underwent mitral clip OR (12/16/22). s/p trach on 12/16. Decannulated on 1/8. Candidemia, on caspofungin per ID. Completed Caspo. Transitioned to iHD on 12/20. Remains HD dependant. s/p RIJ tunneled HD cath on 1/11. Awaiting AVF by vascular     24 hour events/subjective:  Patient seen and examined this morning at bedside. Reported feeling well without any acute complaints. Denied any chest pain, shortness of breath nausea, or vomiting. No acute issues noted overnight. Last HD on 1/13.             PAST HISTORY  --------------------------------------------------------------------------------  No significant changes to PMH, PSH, FHx, SHx, unless otherwise noted    ALLERGIES & MEDICATIONS  --------------------------------------------------------------------------------  Allergies    No Known Allergies    Intolerances      Standing Inpatient Medications  aMIOdarone    Tablet 200 milliGRAM(s) Oral daily  aspirin  chewable 81 milliGRAM(s) Oral daily  atorvastatin 80 milliGRAM(s) Oral at bedtime  benzonatate 100 milliGRAM(s) Oral every 8 hours  caspofungin IVPB 50 milliGRAM(s) IV Intermittent every 24 hours  chlorhexidine 2% Cloths 1 Application(s) Topical <User Schedule>  chlorhexidine 4% Liquid 1 Application(s) Topical <User Schedule>  dextrose 50% Injectable 25 Gram(s) IV Push once  dextrose 50% Injectable 12.5 Gram(s) IV Push once  dextrose 50% Injectable 25 Gram(s) IV Push once  dextrose Oral Gel 15 Gram(s) Oral once  epoetin teena-epbx (RETACRIT) Injectable 5000 Unit(s) IV Push <User Schedule>  glucagon  Injectable 1 milliGRAM(s) IntraMuscular once  heparin   Injectable 5000 Unit(s) SubCutaneous every 8 hours  insulin glargine Injectable (LANTUS) 18 Unit(s) SubCutaneous at bedtime  insulin lispro (ADMELOG) corrective regimen sliding scale   SubCutaneous three times a day before meals  insulin lispro (ADMELOG) corrective regimen sliding scale   SubCutaneous at bedtime  insulin lispro Injectable (ADMELOG) 6 Unit(s) SubCutaneous three times a day before meals  metoprolol tartrate 25 milliGRAM(s) Oral two times a day  midodrine 20 milliGRAM(s) Oral every 8 hours  multivitamin/minerals/iron Oral Solution (CENTRUM) 15 milliLiter(s) Oral daily  pantoprazole    Tablet 40 milliGRAM(s) Oral before breakfast  polyethylene glycol 3350 17 Gram(s) Oral daily  senna 2 Tablet(s) Oral at bedtime  sevelamer carbonate Powder 1600 milliGRAM(s) Oral three times a day with meals  thiamine 100 milliGRAM(s) Enteral Tube daily    PRN Inpatient Medications  acetaminophen    Suspension .. 650 milliGRAM(s) Oral every 6 hours PRN  guaiFENesin Oral Liquid (Sugar-Free) 200 milliGRAM(s) Oral every 6 hours PRN  hydrocodone/homatropine Syrup 5 milliLiter(s) Oral every 8 hours PRN  simethicone 80 milliGRAM(s) Chew daily PRN  sodium chloride 0.9% lock flush 10 milliLiter(s) IV Push every 1 hour PRN      REVIEW OF SYSTEMS  --------------------------------------------------------------------------------        All other systems were reviewed and are negative, except as noted.    VITALS/PHYSICAL EXAM  --------------------------------------------------------------------------------  T(C): 36.5 (01-16-23 @ 10:00), Max: 36.9 (01-15-23 @ 19:07)  HR: 71 (01-16-23 @ 10:00) (71 - 81)  BP: 103/61 (01-16-23 @ 10:00) (86/52 - 114/63)  RR: 17 (01-16-23 @ 10:00) (17 - 18)  SpO2: 100% (01-16-23 @ 10:00) (92% - 100%)  Wt(kg): --        01-15-23 @ 07:01 - 01-16-23 @ 07:00  --------------------------------------------------------  IN: 1220 mL / OUT: 200 mL / NET: 1020 mL    01-16-23 @ 07:01 - 01-16-23 @ 11:16  --------------------------------------------------------  IN: 240 mL / OUT: 0 mL / NET: 240 mL      Physical Exam:  	Gen: NAD  	HEENT: MMM  	Pulm: CTA B/L  	CV: S1S2  	Abd: Soft, +BS   	Ext: No LE edema B/L  	Neuro: Awake  	Skin: Warm and dry  	Vascular access: RIJ tunneled HD catheter     LABS/STUDIES  --------------------------------------------------------------------------------              10.1   10.05 >-----------<  257      [01-16-23 @ 07:08]              31.9     134  |  92  |  75  ----------------------------<  118      [01-16-23 @ 07:08]  3.7   |  24  |  5.90        Ca     9.0     [01-16-23 @ 07:08]      Mg     3.3     [01-16-23 @ 07:08]      Phos  5.4     [01-16-23 @ 07:08]            Creatinine Trend:  SCr 5.90 [01-16 @ 07:08]  SCr 5.05 [01-15 @ 06:13]  SCr 3.72 [01-14 @ 07:36]  SCr 4.21 [01-13 @ 00:49]  SCr 2.10 [01-12 @ 00:19]        TSH 2.42      [01-15-23 @ 06:13]       [facility] Division of Kidney Diseases & Hypertension  FOLLOW UP NOTE  [phone]--------------------------------------------------------------------------------  Chief Complaint:Acute pancreatitis without infection or necrosis    HPI: 63 y/o male with PMH of CABG, DM, HTN, HLD presenting as transfer from [place] for c/f STEMI. Course c/b gallstone pancreatitis leading to ARDS and shock & cardiac arrest requiring [facility] ECMO. Had significant troponin elevation and his LVEF down to 8%. Pt was deemed to be too unstable to have an angiogram and potential PCI due to his co-morbidities & a new subdural bleed. Pt was seen for ERIC. Pt initiated on CRRT on 12/5/22 to optimize volume status and electrolytes. Pt underwent mitral clip OR (12/16/22). s/p trach on 12/16. Decannulated on 1/8. Candidemia, on caspofungin per ID. Completed Caspo. Transitioned to iHD on 12/20. Remains HD dependant. s/p RIJ tunneled HD cath on 1/11. Awaiting AVF by vascular     24 hour events/subjective:  Patient seen and examined this morning at bedside. Reported feeling well without any acute complaints. Denied any chest pain, shortness of breath nausea, or vomiting. No acute issues noted overnight. Last HD on 1/13.             PAST HISTORY  --------------------------------------------------------------------------------  No significant changes to PMH, PSH, FHx, SHx, unless otherwise noted    ALLERGIES & MEDICATIONS  --------------------------------------------------------------------------------  Allergies    No Known Allergies    Intolerances      Standing Inpatient Medications  aMIOdarone    Tablet 200 milliGRAM(s) Oral daily  aspirin  chewable 81 milliGRAM(s) Oral daily  atorvastatin 80 milliGRAM(s) Oral at bedtime  benzonatate 100 milliGRAM(s) Oral every 8 hours  caspofungin IVPB 50 milliGRAM(s) IV Intermittent every 24 hours  chlorhexidine 2% Cloths 1 Application(s) Topical <User Schedule>  chlorhexidine 4% Liquid 1 Application(s) Topical <User Schedule>  dextrose 50% Injectable 25 Gram(s) IV Push once  dextrose 50% Injectable 12.5 Gram(s) IV Push once  dextrose 50% Injectable 25 Gram(s) IV Push once  dextrose Oral Gel 15 Gram(s) Oral once  epoetin teena-epbx (RETACRIT) Injectable 5000 Unit(s) IV Push <User Schedule>  glucagon  Injectable 1 milliGRAM(s) IntraMuscular once  heparin   Injectable 5000 Unit(s) SubCutaneous every 8 hours  insulin glargine Injectable (LANTUS) 18 Unit(s) SubCutaneous at bedtime  insulin lispro (ADMELOG) corrective regimen sliding scale   SubCutaneous three times a day before meals  insulin lispro (ADMELOG) corrective regimen sliding scale   SubCutaneous at bedtime  insulin lispro Injectable (ADMELOG) 6 Unit(s) SubCutaneous three times a day before meals  metoprolol tartrate 25 milliGRAM(s) Oral two times a day  midodrine 20 milliGRAM(s) Oral every 8 hours  multivitamin/minerals/iron Oral Solution (CENTRUM) 15 milliLiter(s) Oral daily  pantoprazole    Tablet 40 milliGRAM(s) Oral before breakfast  polyethylene glycol 3350 17 Gram(s) Oral daily  senna 2 Tablet(s) Oral at bedtime  sevelamer carbonate Powder 1600 milliGRAM(s) Oral three times a day with meals  thiamine 100 milliGRAM(s) Enteral Tube daily    PRN Inpatient Medications  acetaminophen    Suspension .. 650 milliGRAM(s) Oral every 6 hours PRN  guaiFENesin Oral Liquid (Sugar-Free) 200 milliGRAM(s) Oral every 6 hours PRN  hydrocodone/homatropine Syrup 5 milliLiter(s) Oral every 8 hours PRN  simethicone 80 milliGRAM(s) Chew daily PRN  sodium chloride 0.9% lock flush 10 milliLiter(s) IV Push every 1 hour PRN      REVIEW OF SYSTEMS  --------------------------------------------------------------------------------        All other systems were reviewed and are negative, except as noted.    VITALS/PHYSICAL EXAM  --------------------------------------------------------------------------------  T(C): 36.5 (01-16-23 @ 10:00), Max: 36.9 (01-15-23 @ 19:07)  HR: 71 (01-16-23 @ 10:00) (71 - 81)  BP: 103/61 (01-16-23 @ 10:00) (86/52 - 114/63)  RR: 17 (01-16-23 @ 10:00) (17 - 18)  SpO2: 100% (01-16-23 @ 10:00) (92% - 100%)  Wt(kg): --        01-15-23 @ 07:01 - 01-16-23 @ 07:00  --------------------------------------------------------  IN: 1220 mL / OUT: 200 mL / NET: 1020 mL    01-16-23 @ 07:01 - 01-16-23 @ 11:16  --------------------------------------------------------  IN: 240 mL / OUT: 0 mL / NET: 240 mL      Physical Exam:  	Gen: NAD  	HEENT: MMM  	Pulm: CTA B/L  	CV: S1S2  	Abd: Soft, +BS   	Ext: No LE edema B/L  	Neuro: Awake  	Skin: Warm and dry  	Vascular access: RIJ tunneled HD catheter     LABS/STUDIES  --------------------------------------------------------------------------------              10.1   10.05 >-----------<  257      [01-16-23 @ 07:08]              31.9     134  |  92  |  75  ----------------------------<  118      [01-16-23 @ 07:08]  3.7   |  24  |  5.90        Ca     9.0     [01-16-23 @ 07:08]      Mg     3.3     [01-16-23 @ 07:08]      Phos  5.4     [01-16-23 @ 07:08]            Creatinine Trend:  SCr 5.90 [01-16 @ 07:08]  SCr 5.05 [01-15 @ 06:13]  SCr 3.72 [01-14 @ 07:36]  SCr 4.21 [01-13 @ 00:49]  SCr 2.10 [01-12 @ 00:19]        TSH 2.42      [01-15-23 @ 06:13]       [facility] Division of Kidney Diseases & Hypertension  FOLLOW UP NOTE  [phone]--------------------------------------------------------------------------------  Chief Complaint:Acute pancreatitis without infection or necrosis    HPI: 63 y/o male with PMH of CABG, DM, HTN, HLD presenting as transfer from [place] for c/f STEMI. Course c/b gallstone pancreatitis leading to ARDS and shock & cardiac arrest requiring [facility] ECMO. Had significant troponin elevation and his LVEF down to 8%. Pt was deemed to be too unstable to have an angiogram and potential PCI due to his co-morbidities & a new subdural bleed. Pt was seen for ERIC. Pt initiated on CRRT on 12/5/22 to optimize volume status and electrolytes. Pt underwent mitral clip OR (12/16/22). s/p trach on 12/16. Decannulated on 1/8. Candidemia, on caspofungin per ID. Completed Caspo. Transitioned to iHD on 12/20. Remains HD dependant. s/p RIJ tunneled HD cath on 1/11. Awaiting AVF by vascular     24 hour events/subjective:  Patient seen and examined this morning at bedside. Reported feeling well without any acute complaints. Denied any chest pain, shortness of breath nausea, or vomiting. No acute issues noted overnight. Last HD on 1/13.             PAST HISTORY  --------------------------------------------------------------------------------  No significant changes to PMH, PSH, FHx, SHx, unless otherwise noted    ALLERGIES & MEDICATIONS  --------------------------------------------------------------------------------  Allergies    No Known Allergies    Intolerances      Standing Inpatient Medications  aMIOdarone    Tablet 200 milliGRAM(s) Oral daily  aspirin  chewable 81 milliGRAM(s) Oral daily  atorvastatin 80 milliGRAM(s) Oral at bedtime  benzonatate 100 milliGRAM(s) Oral every 8 hours  caspofungin IVPB 50 milliGRAM(s) IV Intermittent every 24 hours  chlorhexidine 2% Cloths 1 Application(s) Topical <User Schedule>  chlorhexidine 4% Liquid 1 Application(s) Topical <User Schedule>  dextrose 50% Injectable 25 Gram(s) IV Push once  dextrose 50% Injectable 12.5 Gram(s) IV Push once  dextrose 50% Injectable 25 Gram(s) IV Push once  dextrose Oral Gel 15 Gram(s) Oral once  epoetin teena-epbx (RETACRIT) Injectable 5000 Unit(s) IV Push <User Schedule>  glucagon  Injectable 1 milliGRAM(s) IntraMuscular once  heparin   Injectable 5000 Unit(s) SubCutaneous every 8 hours  insulin glargine Injectable (LANTUS) 18 Unit(s) SubCutaneous at bedtime  insulin lispro (ADMELOG) corrective regimen sliding scale   SubCutaneous three times a day before meals  insulin lispro (ADMELOG) corrective regimen sliding scale   SubCutaneous at bedtime  insulin lispro Injectable (ADMELOG) 6 Unit(s) SubCutaneous three times a day before meals  metoprolol tartrate 25 milliGRAM(s) Oral two times a day  midodrine 20 milliGRAM(s) Oral every 8 hours  multivitamin/minerals/iron Oral Solution (CENTRUM) 15 milliLiter(s) Oral daily  pantoprazole    Tablet 40 milliGRAM(s) Oral before breakfast  polyethylene glycol 3350 17 Gram(s) Oral daily  senna 2 Tablet(s) Oral at bedtime  sevelamer carbonate Powder 1600 milliGRAM(s) Oral three times a day with meals  thiamine 100 milliGRAM(s) Enteral Tube daily    PRN Inpatient Medications  acetaminophen    Suspension .. 650 milliGRAM(s) Oral every 6 hours PRN  guaiFENesin Oral Liquid (Sugar-Free) 200 milliGRAM(s) Oral every 6 hours PRN  hydrocodone/homatropine Syrup 5 milliLiter(s) Oral every 8 hours PRN  simethicone 80 milliGRAM(s) Chew daily PRN  sodium chloride 0.9% lock flush 10 milliLiter(s) IV Push every 1 hour PRN      REVIEW OF SYSTEMS  --------------------------------------------------------------------------------        All other systems were reviewed and are negative, except as noted.    VITALS/PHYSICAL EXAM  --------------------------------------------------------------------------------  T(C): 36.5 (01-16-23 @ 10:00), Max: 36.9 (01-15-23 @ 19:07)  HR: 71 (01-16-23 @ 10:00) (71 - 81)  BP: 103/61 (01-16-23 @ 10:00) (86/52 - 114/63)  RR: 17 (01-16-23 @ 10:00) (17 - 18)  SpO2: 100% (01-16-23 @ 10:00) (92% - 100%)  Wt(kg): --        01-15-23 @ 07:01 - 01-16-23 @ 07:00  --------------------------------------------------------  IN: 1220 mL / OUT: 200 mL / NET: 1020 mL    01-16-23 @ 07:01 - 01-16-23 @ 11:16  --------------------------------------------------------  IN: 240 mL / OUT: 0 mL / NET: 240 mL      Physical Exam:  	Gen: NAD  	HEENT: MMM  	Pulm: CTA B/L  	CV: S1S2  	Abd: Soft, +BS   	Ext: No LE edema B/L  	Neuro: Awake  	Skin: Warm and dry  	Vascular access: RIJ tunneled HD catheter     LABS/STUDIES  --------------------------------------------------------------------------------              10.1   10.05 >-----------<  257      [01-16-23 @ 07:08]              31.9     134  |  92  |  75  ----------------------------<  118      [01-16-23 @ 07:08]  3.7   |  24  |  5.90        Ca     9.0     [01-16-23 @ 07:08]      Mg     3.3     [01-16-23 @ 07:08]      Phos  5.4     [01-16-23 @ 07:08]            Creatinine Trend:  SCr 5.90 [01-16 @ 07:08]  SCr 5.05 [01-15 @ 06:13]  SCr 3.72 [01-14 @ 07:36]  SCr 4.21 [01-13 @ 00:49]  SCr 2.10 [01-12 @ 00:19]        TSH 2.42      [01-15-23 @ 06:13]

## 2023-01-16 NOTE — PROGRESS NOTE ADULT - ASSESSMENT
Assessment  DMT2: 62y Male with DM T2 with hyperglycemia admitted with CAD, patient was on NPH and insulin coverage, blood sugars within acceptable range, eating meals.  Patient is high risk with high level decision making due to uncontrolled diabetes, has increased risk for complications.   CAD: S/P CABG, on medications, monitored, asymptomatic.  HTN: On antihypertensive medications, monitored, asymptomatic.              Nayeli Quiles MD  Cell:  [phone]  Office: [phone]               Assessment  DMT2: 62y Male with DM T2 with hyperglycemia admitted with CAD, patient was on NPH and insulin coverage, blood sugars within acceptable range, eating meals.  Patient is high risk with high level decision making due to uncontrolled diabetes, has increased risk for complications.   CAD: S/P CABG, on medications, monitored, asymptomatic.  HTN: On antihypertensive medications, monitored, asymptomatic.              Nayeli Quiles MD  Cell:  [phone]  Office: [phone]               Assessment  DMT2: 62y Male with DM T2 with hyperglycemia admitted with CAD, patient was on NPH and insulin coverage, blood sugars within acceptable range, eating meals.  Patient is high risk with high level decision making due to uncontrolled diabetes, has increased risk for complications.   CAD: S/P CABG, on medications, monitored, asymptomatic.  HTN: On antihypertensive medications, monitored, asymptomatic.              Nayeli Quiles MD  Cell:  [phone]  Office: [phone]

## 2023-01-16 NOTE — PROGRESS NOTE ADULT - SUBJECTIVE AND OBJECTIVE BOX
Chief complaint  Patient is a 62y old  Male who presents with a chief complaint of sp    ECMO   Mitral clip (16 Jan 2023 09:51)   Review of systems  Patient in bed, looks comfortable.  Labs and Fingersticks  CAPILLARY BLOOD GLUCOSE      POCT Blood Glucose.: 168 mg/dL (16 Jan 2023 12:39)  POCT Blood Glucose.: 136 mg/dL (16 Jan 2023 07:19)  POCT Blood Glucose.: 133 mg/dL (15 Teo 2023 20:33)      Anion Gap, Serum: 18 *H* (01-16 @ 07:08)  Anion Gap, Serum: 16 (01-15 @ 06:13)      Calcium, Total Serum: 9.0 (01-16 @ 07:08)  Calcium, Total Serum: 9.1 (01-15 @ 06:13)          01-16    134<L>  |  92<L>  |  75<H>  ----------------------------<  118<H>  3.7   |  24  |  5.90<H>    Ca    9.0      16 Jan 2023 07:08  Phos  5.4     01-16  Mg     3.3     01-16                          10.1   10.05 )-----------( 257      ( 16 Jan 2023 07:08 )             31.9     Medications  MEDICATIONS  (STANDING):  aspirin  chewable 81 milliGRAM(s) Oral daily  atorvastatin 80 milliGRAM(s) Oral at bedtime  benzonatate 100 milliGRAM(s) Oral every 8 hours  caspofungin IVPB 50 milliGRAM(s) IV Intermittent every 24 hours  chlorhexidine 2% Cloths 1 Application(s) Topical <User Schedule>  chlorhexidine 4% Liquid 1 Application(s) Topical <User Schedule>  dextrose 50% Injectable 25 Gram(s) IV Push once  dextrose 50% Injectable 12.5 Gram(s) IV Push once  dextrose 50% Injectable 25 Gram(s) IV Push once  dextrose Oral Gel 15 Gram(s) Oral once  epoetin teena-epbx (RETACRIT) Injectable 5000 Unit(s) IV Push <User Schedule>  glucagon  Injectable 1 milliGRAM(s) IntraMuscular once  heparin   Injectable 5000 Unit(s) SubCutaneous every 8 hours  insulin glargine Injectable (LANTUS) 18 Unit(s) SubCutaneous at bedtime  insulin lispro (ADMELOG) corrective regimen sliding scale   SubCutaneous three times a day before meals  insulin lispro (ADMELOG) corrective regimen sliding scale   SubCutaneous at bedtime  insulin lispro Injectable (ADMELOG) 6 Unit(s) SubCutaneous three times a day before meals  metoprolol tartrate 25 milliGRAM(s) Oral two times a day  midodrine 20 milliGRAM(s) Oral every 8 hours  multivitamin/minerals/iron Oral Solution (CENTRUM) 15 milliLiter(s) Oral daily  pantoprazole    Tablet 40 milliGRAM(s) Oral before breakfast  polyethylene glycol 3350 17 Gram(s) Oral daily  senna 2 Tablet(s) Oral at bedtime  sevelamer carbonate Powder 1600 milliGRAM(s) Oral three times a day with meals  thiamine 100 milliGRAM(s) Enteral Tube daily  witch hazel Pads 1 Application(s) Topical two times a day      Physical Exam  General: Patient comfortable in bed  Vital Signs Last 12 Hrs  T(F): 97.9 (01-16-23 @ 15:05), Max: 98 (01-16-23 @ 06:52)  HR: 75 (01-16-23 @ 15:05) (71 - 79)  BP: 94/51 (01-16-23 @ 15:05) (90/51 - 117/62)  BP(mean): 67 (01-16-23 @ 15:05) (65 - 83)  RR: 18 (01-16-23 @ 15:05) (17 - 18)  SpO2: 96% (01-16-23 @ 15:05) (96% - 100%)  Neck: No palpable thyroid nodules.  CVS: S1S2, No murmurs  Respiratory: No wheezing, no crepitations  GI: Abdomen soft, bowel sounds positive  Musculoskeletal:  edema lower extremities.   Skin: No skin rashes, no ecchymosis    Diagnostics    Free Thyroxine, Serum: AM Sched. Collection: 15-Teo-2023 06:00 (01-14 @ 14:23)  Thyroid Stimulating Hormone, Serum: AM Sched. Collection: 15-Teo-2023 06:00 (01-14 @ 14:23)

## 2023-01-16 NOTE — PROGRESS NOTE ADULT - PROBLEM SELECTOR PLAN 1
Pt with ERIC/ ATN in the setting of STEMI, cardiac arrest & cardiogenic shock. SCr on arrival was 2.15; trended down to hector 1.6 on 11/25; slowly trended up & increased to 3.95 11/28. Pt initiated on CRRT/CVVHDF to optimize volume and electrolytes on 12/5/22. Pt likely with ATN. Pt underwent decannulation of ECMO on 12/8/22 and was taken off CRRT.   Pt reinitiated on CRRT 12/9/22 for volume overload. CRRT stopped again 12/19 & initiated on iHD.    s/p RIJ tunneled HD cath on 1/11. Last HD session 1/13 with UF. Labs reviewed. Interdialytic solute rise noted. UOP 200cc in 24 hrs. Remains dialysis dependant. Will do HD today. Continue to monitor for renal recovery. Monitor labs and urine output. Avoid any potential nephrotoxins. Dose medications as per HD. LUE AVF planning per vascular sx. Hg at goal. On EPO TIW. Phos okay. Continue sevelamer 1600tid with meals. Low phos diet

## 2023-01-16 NOTE — PROGRESS NOTE ADULT - ATTENDING COMMENTS
ERIC: HD dependent   Underwent HD 1/13  Currently with no renal recovery. Minimal U/O recorded  Seen at dialysis today. Tolerating well  Maintain on current prescription   Hg stable on MIGUEL    Rest per Dr.Gosalia Adama Mancera MD  O: [phone]  Contact me on teams ERIC: HD dependent   Underwent HD 1/13  Currently with no renal recovery. Minimal U/O recorded  Seen at dialysis today. Tolerating well  Maintain on current prescription   Hg stable on MIGUEL    Rest per Dr.Gosalia Adama Mancera MD  O: [phone]  Contact me on teams ERIC: HD dependent   Underwent HD 1/13  Currently with no renal recovery. Minimal U/O recorded  Seen at dialysis today. Tolerating well  Maintain on current prescription   Hg stable on MIGUEL    Rest per Dr.Gosalia Adama Mancera MD  O: [phone]  Contact me on teams

## 2023-01-16 NOTE — PROGRESS NOTE ADULT - ASSESSMENT
63 YO M with a history of CAD s/p 4v CABG ~2019 in [facility], DM2 (A1c 7.3%), and HTN who initially presented to OSH with abdominal pain and n/v and found to have pancreatitis with lipase > 3000 though also with elevated troponins. CT abdomen revealed acute pancreatitis and his initial LVEF was 40-45%. He developed MSOF with hypoxic respiratory failure requiring intubation and ERIC and went into hypoxic PEA arrest requiring ACLS and due to persistent hypoxia and hypotension on pressors after ROSC was cannulated to [facility] ECMO (LFV, RFA, no anterograde perfusion catheter) on 11/25. Notably CTH that day revealed small subdural hemorrhage.     His post arrest TTE on 11/26 showed a significantly worse LVEF of 5-10% with an AV that was only minimally opening. Since then he has had improvement in his LV function to ~35-40% and improved pulsatility while tolerating progressive slow ECMO weans. ECMO turndown (note in chart 11/30) was reassuring for ability to decannulate to IABP/inotropes. LHC 12/06 showed closure of his 3 vein grafts with graft to LAD patent though with distal native disease. No coronary intervention was done. IABP placed, ECMO successfully decannulated 12/08 (transfused 2u pRBCs during).     His ARDS has improved and he's now s/p trach (decannulated 1/8). He's now off IABP, removed 12/17, and inotropes, however has previously been unable to tolerate GDMT due to soft BPs and renal failure requiring dialysis. 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 He's afebrile with improving leukocytosis. He has urine output but not enough to stay euvolemic. He's tolerating intermittent HD and remains on midodrine for BP support.     11/24 Transfer from [facility] to SICU c/f STEMI, abd US +GB stones, pericholecystic fluid  11/25 VA ECMO s/p hypoxic respiratory arrest/PEA arrest, CTH 4mm subdural, ERIC  12/5 CVVHD, RUQ US neg   12/6 IABP placed in cath lab, LIMA to LAD patent, RCA and LCx down, CTH subdural decreased in size, persistent pancreatic inflammation   12/7 TTE turndown, EF 30-40%, nml RV, MR mod, mod TR  12/8 ECMO decannulation, aflutter DCCV x 2  12/12 Mental status change- CTH no change, CT perfusion/CTA H/N neg for LVO, +low grade watershed infarct to L MCA  12/16 Mitral clip x 1, TRACH 7 Shiley XLT w/ ENT  12/17 IABP dced  12/26 +clinton BCx   12/27 R groin vac  1/8 trach decannulation   1/10 passed FEEST  1/11 R permacath placed by IR  1/13 Transferred to SDU  1/14 VSS; RSR ; hd as per renal MWF; continue vac to rt groin ; nutritional optimization and PT; [facility] sx called for AV fistula placement on this admission (vein mapping completed 12/22) ; continue capsofungin as per Dr. Rodrigues; endo consulted today for dm management- placed on lantus and admelog  1/15 VSS; RSR 70-80; HD as per renal - k 3.8 today; continue nutritional support and pt; prealbumin 21; await date of AVF from [facility] sx--> pt cleared as per cardiac surgeon Dr. Rodrigues- pt must have AVF with cardiac anesthesia-discussed with [facility] sx fellow Rajeev Ascencio; continue vac to rt groin; bs improved on lantus/ admelog   discharge planning- acute rehab when stable   1/16      HD,   rt groin vac     labs stable   63 YO M with a history of CAD s/p 4v CABG ~2019 in [facility], DM2 (A1c 7.3%), and HTN who initially presented to OSH with abdominal pain and n/v and found to have pancreatitis with lipase > 3000 though also with elevated troponins. CT abdomen revealed acute pancreatitis and his initial LVEF was 40-45%. He developed MSOF with hypoxic respiratory failure requiring intubation and ERIC and went into hypoxic PEA arrest requiring ACLS and due to persistent hypoxia and hypotension on pressors after ROSC was cannulated to VA ECMO (LFV, RFA, no anterograde perfusion catheter) on 11/25. Notably CTH that day revealed small subdural hemorrhage.     His post arrest TTE on 11/26 showed a significantly worse LVEF of 5-10% with an AV that was only minimally opening. Since then he has had improvement in his LV function to ~35-40% and improved pulsatility while tolerating progressive slow ECMO weans. ECMO turndown (note in chart 11/30) was reassuring for ability to decannulate to IABP/inotropes. LHC 12/06 showed closure of his 3 vein grafts with graft to LAD patent though with distal native disease. No coronary intervention was done. IABP placed, ECMO successfully decannulated 12/08 (transfused 2u pRBCs during).     His ARDS has improved and he's now s/p trach (decannulated 1/8). He's now off IABP, removed 12/17, and inotropes, however has previously been unable to tolerate GDMT due to soft BPs and renal failure requiring dialysis. 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 He's afebrile with improving leukocytosis. He has urine output but not enough to stay euvolemic. He's tolerating intermittent HD and remains on midodrine for BP support.     11/24 Transfer from [facility] to SICU c/f STEMI, abd US +GB stones, pericholecystic fluid  11/25 VA ECMO s/p hypoxic respiratory arrest/PEA arrest, CTH 4mm subdural, ERIC  12/5 CVVHD, RUQ US neg   12/6 IABP placed in cath lab, LIMA to LAD patent, RCA and LCx down, CTH subdural decreased in size, persistent pancreatic inflammation   12/7 TTE turndown, EF 30-40%, nml RV, MR mod, mod TR  12/8 ECMO decannulation, aflutter DCCV x 2  12/12 Mental status change- CTH no change, CT perfusion/CTA H/N neg for LVO, +low grade watershed infarct to L MCA  12/16 Mitral clip x 1, TRACH 7 Shiley XLT w/ ENT  12/17 IABP dced  12/26 +clinton BCx   12/27 R groin vac  1/8 trach decannulation   1/10 passed FEEST  1/11 R permacath placed by IR  1/13 Transferred to SDU  1/14 VSS; RSR ; hd as per renal MWF; continue vac to rt groin ; nutritional optimization and PT; [facility] sx called for AV fistula placement on this admission (vein mapping completed 12/22) ; continue capsofungin as per Dr. Rodrigues; endo consulted today for dm management- placed on lantus and admelog  1/15 VSS; RSR 70-80; HD as per renal - k 3.8 today; continue nutritional support and pt; prealbumin 21; await date of AVF from [facility] sx--> pt cleared as per cardiac surgeon Dr. Rodrigues- pt must have AVF with cardiac anesthesia-discussed with [facility] sx fellow Rajeev Ascencio; continue vac to rt groin; bs improved on lantus/ admelog   discharge planning- acute rehab when stable   1/16      HD,   rt groin vac     labs stable   61 YO M with a history of CAD s/p 4v CABG ~2019 in [facility], DM2 (A1c 7.3%), and HTN who initially presented to OSH with abdominal pain and n/v and found to have pancreatitis with lipase > 3000 though also with elevated troponins. CT abdomen revealed acute pancreatitis and his initial LVEF was 40-45%. He developed MSOF with hypoxic respiratory failure requiring intubation and ERIC and went into hypoxic PEA arrest requiring ACLS and due to persistent hypoxia and hypotension on pressors after ROSC was cannulated to VA ECMO (LFV, RFA, no anterograde perfusion catheter) on 11/25. Notably CTH that day revealed small subdural hemorrhage.     His post arrest TTE on 11/26 showed a significantly worse LVEF of 5-10% with an AV that was only minimally opening. Since then he has had improvement in his LV function to ~35-40% and improved pulsatility while tolerating progressive slow ECMO weans. ECMO turndown (note in chart 11/30) was reassuring for ability to decannulate to IABP/inotropes. LHC 12/06 showed closure of his 3 vein grafts with graft to LAD patent though with distal native disease. No coronary intervention was done. IABP placed, ECMO successfully decannulated 12/08 (transfused 2u pRBCs during).     His ARDS has improved and he's now s/p trach (decannulated 1/8). He's now off IABP, removed 12/17, and inotropes, however has previously been unable to tolerate GDMT due to soft BPs and renal failure requiring dialysis. 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 He's afebrile with improving leukocytosis. He has urine output but not enough to stay euvolemic. He's tolerating intermittent HD and remains on midodrine for BP support.     11/24 Transfer from [facility] to SICU c/f STEMI, abd US +GB stones, pericholecystic fluid  11/25 VA ECMO s/p hypoxic respiratory arrest/PEA arrest, CTH 4mm subdural, ERIC  12/5 CVVHD, RUQ US neg   12/6 IABP placed in cath lab, LIMA to LAD patent, RCA and LCx down, CTH subdural decreased in size, persistent pancreatic inflammation   12/7 TTE turndown, EF 30-40%, nml RV, MR mod, mod TR  12/8 ECMO decannulation, aflutter DCCV x 2  12/12 Mental status change- CTH no change, CT perfusion/CTA H/N neg for LVO, +low grade watershed infarct to L MCA  12/16 Mitral clip x 1, TRACH 7 Shiley XLT w/ ENT  12/17 IABP dced  12/26 +clinton BCx   12/27 R groin vac  1/8 trach decannulation   1/10 passed FEEST  1/11 R permacath placed by IR  1/13 Transferred to SDU  1/14 VSS; RSR ; hd as per renal MWF; continue vac to rt groin ; nutritional optimization and PT; [facility] sx called for AV fistula placement on this admission (vein mapping completed 12/22) ; continue capsofungin as per Dr. Rodrigues; endo consulted today for dm management- placed on lantus and admelog  1/15 VSS; RSR 70-80; HD as per renal - k 3.8 today; continue nutritional support and pt; prealbumin 21; await date of AVF from [facility] sx--> pt cleared as per cardiac surgeon Dr. Rodrigues- pt must have AVF with cardiac anesthesia-discussed with [facility] sx fellow Rajeev Ascencio; continue vac to rt groin; bs improved on lantus/ admelog   discharge planning- acute rehab when stable   1/16      HD,   rt groin vac     labs stable

## 2023-01-16 NOTE — PROGRESS NOTE ADULT - PROBLEM SELECTOR PLAN 4
- small 3mm subdural hemorrhage 11/25, stable on repeat CTs, last 12/13  - okay to continue heparin, appreciate neuro recs  - keep MAP < 75 mmHg and careful monitoring of PTT.

## 2023-01-16 NOTE — PROGRESS NOTE ADULT - ATTENDING SUPERVISION STATEMENT
Fellow
Resident
Fellow
Resident
Fellow

## 2023-01-16 NOTE — PROGRESS NOTE ADULT - SUBJECTIVE AND OBJECTIVE BOX
VITAL SIGNS    Telemetry:      Vital Signs Last 24 Hrs  T(C): 36.7 (23 @ 06:52), Max: 36.9 (01-15-23 @ 19:07)  T(F): 98 (23 @ 06:52), Max: 98.4 (01-15-23 @ 19:07)  HR: 74 (23 @ 08:04) (72 - 82)  BP: 104/59 (23 @ 08:04) (86/52 - 116/59)  RR: 18 (23 @ 06:52) (18 - 18)  SpO2: 99% (23 @ 08:04) (92% - 100%)                   Daily     Daily Weight in k.8 (2023 06:13)        CAPILLARY BLOOD GLUCOSE      POCT Blood Glucose.: 136 mg/dL (2023 07:19)  POCT Blood Glucose.: 133 mg/dL (15 Teo 2023 20:33)  POCT Blood Glucose.: 193 mg/dL (15 Teo 2023 16:19)  POCT Blood Glucose.: 158 mg/dL (15 Teo 2023 12:21)  POCT Blood Glucose.: 148 mg/dL (15 Teo 2023 11:11)          Drains:   rt groin VAC                    PHYSICAL EXAM  s   NO CP  No SOB  Neurology: alert and oriented x 3, moves all extremities with no defecits  CV :  RRR  Sternal Wound :  CDI , Stable  Lungs:   CTA B/L  Abdomen: soft, nontender, nondistended, positive bowel sounds, last bowel movement    1/15  Extremities:     no edema ,  rt cw  permacath

## 2023-01-17 LAB
ANION GAP SERPL CALC-SCNC: 15 MMOL/L — SIGNIFICANT CHANGE UP (ref 5–17)
BUN SERPL-MCNC: 59 MG/DL — HIGH (ref 7–23)
CALCIUM SERPL-MCNC: 8.9 MG/DL — SIGNIFICANT CHANGE UP (ref 8.4–10.5)
CHLORIDE SERPL-SCNC: 95 MMOL/L — LOW (ref 96–108)
CO2 SERPL-SCNC: 25 MMOL/L — SIGNIFICANT CHANGE UP (ref 22–31)
CREAT SERPL-MCNC: 4.31 MG/DL — HIGH (ref 0.5–1.3)
EGFR: 15 ML/MIN/1.73M2 — LOW
GLUCOSE BLDC GLUCOMTR-MCNC: 148 MG/DL — HIGH (ref 70–99)
GLUCOSE BLDC GLUCOMTR-MCNC: 150 MG/DL — HIGH (ref 70–99)
GLUCOSE BLDC GLUCOMTR-MCNC: 159 MG/DL — HIGH (ref 70–99)
GLUCOSE BLDC GLUCOMTR-MCNC: 96 MG/DL — SIGNIFICANT CHANGE UP (ref 70–99)
GLUCOSE SERPL-MCNC: 106 MG/DL — HIGH (ref 70–99)
HCT VFR BLD CALC: 33.3 % — LOW (ref 39–50)
HGB BLD-MCNC: 10.4 G/DL — LOW (ref 13–17)
MAGNESIUM SERPL-MCNC: 3 MG/DL — HIGH (ref 1.6–2.6)
MCHC RBC-ENTMCNC: 30.6 PG — SIGNIFICANT CHANGE UP (ref 27–34)
MCHC RBC-ENTMCNC: 31.2 GM/DL — LOW (ref 32–36)
MCV RBC AUTO: 97.9 FL — SIGNIFICANT CHANGE UP (ref 80–100)
NRBC # BLD: 0 /100 WBCS — SIGNIFICANT CHANGE UP (ref 0–0)
PHOSPHATE SERPL-MCNC: 4.1 MG/DL — SIGNIFICANT CHANGE UP (ref 2.5–4.5)
PLATELET # BLD AUTO: 189 K/UL — SIGNIFICANT CHANGE UP (ref 150–400)
POTASSIUM SERPL-MCNC: 3.7 MMOL/L — SIGNIFICANT CHANGE UP (ref 3.5–5.3)
POTASSIUM SERPL-SCNC: 3.7 MMOL/L — SIGNIFICANT CHANGE UP (ref 3.5–5.3)
RBC # BLD: 3.4 M/UL — LOW (ref 4.2–5.8)
RBC # FLD: 19.4 % — HIGH (ref 10.3–14.5)
SODIUM SERPL-SCNC: 135 MMOL/L — SIGNIFICANT CHANGE UP (ref 135–145)
WBC # BLD: 9.86 K/UL — SIGNIFICANT CHANGE UP (ref 3.8–10.5)
WBC # FLD AUTO: 9.86 K/UL — SIGNIFICANT CHANGE UP (ref 3.8–10.5)

## 2023-01-17 PROCEDURE — 99232 SBSQ HOSP IP/OBS MODERATE 35: CPT | Mod: 24

## 2023-01-17 PROCEDURE — 99232 SBSQ HOSP IP/OBS MODERATE 35: CPT

## 2023-01-17 RX ORDER — HYDROCORTISONE 1 %
1 OINTMENT (GRAM) TOPICAL DAILY
Refills: 0 | Status: DISCONTINUED | OUTPATIENT
Start: 2023-01-17 | End: 2023-01-20

## 2023-01-17 RX ORDER — PHENYLEPHRINE-SHARK LIVER OIL-MINERAL OIL-PETROLATUM RECTAL OINTMENT
1 OINTMENT (GRAM) RECTAL
Refills: 0 | Status: DISCONTINUED | OUTPATIENT
Start: 2023-01-17 | End: 2023-01-19

## 2023-01-17 RX ADMIN — INSULIN GLARGINE 18 UNIT(S): 100 INJECTION, SOLUTION SUBCUTANEOUS at 21:31

## 2023-01-17 RX ADMIN — Medication 1 SUPPOSITORY(S): at 14:54

## 2023-01-17 RX ADMIN — AER TRAVELER 1 APPLICATION(S): 0.5 SOLUTION RECTAL; TOPICAL at 05:41

## 2023-01-17 RX ADMIN — PANTOPRAZOLE SODIUM 40 MILLIGRAM(S): 20 TABLET, DELAYED RELEASE ORAL at 05:50

## 2023-01-17 RX ADMIN — CASPOFUNGIN ACETATE 260 MILLIGRAM(S): 7 INJECTION, POWDER, LYOPHILIZED, FOR SOLUTION INTRAVENOUS at 08:40

## 2023-01-17 RX ADMIN — Medication 100 MILLIGRAM(S): at 21:04

## 2023-01-17 RX ADMIN — Medication 100 MILLIGRAM(S): at 11:34

## 2023-01-17 RX ADMIN — ATORVASTATIN CALCIUM 80 MILLIGRAM(S): 80 TABLET, FILM COATED ORAL at 21:04

## 2023-01-17 RX ADMIN — CHLORHEXIDINE GLUCONATE 1 APPLICATION(S): 213 SOLUTION TOPICAL at 05:41

## 2023-01-17 RX ADMIN — Medication 100 MILLIGRAM(S): at 13:22

## 2023-01-17 RX ADMIN — POLYETHYLENE GLYCOL 3350 17 GRAM(S): 17 POWDER, FOR SOLUTION ORAL at 11:33

## 2023-01-17 RX ADMIN — SIMETHICONE 80 MILLIGRAM(S): 80 TABLET, CHEWABLE ORAL at 18:12

## 2023-01-17 RX ADMIN — Medication 100 MILLIGRAM(S): at 05:40

## 2023-01-17 RX ADMIN — SEVELAMER CARBONATE 1600 MILLIGRAM(S): 2400 POWDER, FOR SUSPENSION ORAL at 16:39

## 2023-01-17 RX ADMIN — Medication 15 MILLILITER(S): at 11:33

## 2023-01-17 RX ADMIN — Medication 6 UNIT(S): at 07:54

## 2023-01-17 RX ADMIN — SENNA PLUS 2 TABLET(S): 8.6 TABLET ORAL at 21:04

## 2023-01-17 RX ADMIN — Medication 25 MILLIGRAM(S): at 05:40

## 2023-01-17 RX ADMIN — Medication 81 MILLIGRAM(S): at 11:34

## 2023-01-17 RX ADMIN — CHLORHEXIDINE GLUCONATE 1 APPLICATION(S): 213 SOLUTION TOPICAL at 05:50

## 2023-01-17 RX ADMIN — Medication 6 UNIT(S): at 16:37

## 2023-01-17 RX ADMIN — Medication 25 MILLIGRAM(S): at 17:14

## 2023-01-17 RX ADMIN — Medication 6 UNIT(S): at 11:35

## 2023-01-17 RX ADMIN — SEVELAMER CARBONATE 1600 MILLIGRAM(S): 2400 POWDER, FOR SUSPENSION ORAL at 11:34

## 2023-01-17 RX ADMIN — MIDODRINE HYDROCHLORIDE 20 MILLIGRAM(S): 2.5 TABLET ORAL at 13:21

## 2023-01-17 RX ADMIN — HEPARIN SODIUM 5000 UNIT(S): 5000 INJECTION INTRAVENOUS; SUBCUTANEOUS at 01:28

## 2023-01-17 RX ADMIN — SEVELAMER CARBONATE 1600 MILLIGRAM(S): 2400 POWDER, FOR SUSPENSION ORAL at 07:54

## 2023-01-17 NOTE — PROGRESS NOTE ADULT - ASSESSMENT
63 YO M with a history of CAD s/p 4v CABG ~2019 in [facility], DM2 (A1c 7.3%), and HTN who initially presented to OSH with abdominal pain and n/v and found to have pancreatitis with lipase > 3000 though also with elevated troponins. CT abdomen revealed acute pancreatitis and his initial LVEF was 40-45%. He developed MSOF with hypoxic respiratory failure requiring intubation and ERIC and went into hypoxic PEA arrest requiring ACLS and due to persistent hypoxia and hypotension on pressors after ROSC was cannulated to VA ECMO (LFV, RFA, no anterograde perfusion catheter) on 11/25. Notably CTH that day revealed small subdural hemorrhage.     His post arrest TTE on 11/26 showed a significantly worse LVEF of 5-10% with an AV that was only minimally opening. Since then he has had improvement in his LV function to ~35-40% and improved pulsatility while tolerating progressive slow ECMO weans. ECMO turndown (note in chart 11/30) was reassuring for ability to decannulate to IABP/inotropes. LHC 12/06 showed closure of his 3 vein grafts with graft to LAD patent though with distal native disease. No coronary intervention was done. IABP placed, ECMO successfully decannulated 12/08 (transfused 2u pRBCs during).     His ARDS has improved and he's now s/p trach (decannulated 1/8). He's now off IABP, removed 12/17, and inotropes, however has previously been unable to tolerate GDMT due to soft BPs and renal failure requiring dialysis. 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 He's afebrile with improving leukocytosis. He has urine output but not enough to stay euvolemic. He's tolerating intermittent HD and remains on midodrine for BP support.     11/24 Transfer from [facility] to SICU c/f STEMI, abd US +GB stones, pericholecystic fluid  11/25 VA ECMO s/p hypoxic respiratory arrest/PEA arrest, CTH 4mm subdural, ERIC  12/5 CVVHD, RUQ US neg   12/6 IABP placed in cath lab, LIMA to LAD patent, RCA and LCx down, CTH subdural decreased in size, persistent pancreatic inflammation   12/7 TTE turndown, EF 30-40%, nml RV, MR mod, mod TR  12/8 ECMO decannulation, aflutter DCCV x 2  12/12 Mental status change- CTH no change, CT perfusion/CTA H/N neg for LVO, +low grade watershed infarct to L MCA  12/16 Mitral clip x 1, TRACH 7 Shiley XLT w/ ENT  12/17 IABP dced  12/26 +clinton BCx   12/27 R groin vac  1/8 trach decannulation   1/10 passed FEEST  1/11 R permacath placed by IR  1/13 Transferred to SDU  1/14 VSS; RSR ; hd as per renal MWF; continue vac to rt groin ; nutritional optimization and PT; [facility] sx called for AV fistula placement on this admission (vein mapping completed 12/22) ; continue capsofungin as per Dr. Rodrigues; endo consulted today for dm management- placed on lantus and admelog  1/15 VSS; RSR 70-80; HD as per renal - k 3.8 today; continue nutritional support and pt; prealbumin 21; await date of AVF from [facility] sx--> pt cleared as per cardiac surgeon Dr. Rodrigues- pt must have AVF with cardiac anesthesia-discussed with [facility] sx fellow Rajeev Ascencio; continue vac to rt groin; bs improved on lantus/ admelog   discharge planning- acute rehab when stable   1/16      HD,   rt groin vac     labs stable  1/17 Increase ambulation.   AVF this week, will need cardiac anesthesia   May transfer to floor  Cont capsofungin   63 YO M with a history of CAD s/p 4v CABG ~2019 in [facility], DM2 (A1c 7.3%), and HTN who initially presented to OSH with abdominal pain and n/v and found to have pancreatitis with lipase > 3000 though also with elevated troponins. CT abdomen revealed acute pancreatitis and his initial LVEF was 40-45%. He developed MSOF with hypoxic respiratory failure requiring intubation and ERIC and went into hypoxic PEA arrest requiring ACLS and due to persistent hypoxia and hypotension on pressors after ROSC was cannulated to VA ECMO (LFV, RFA, no anterograde perfusion catheter) on 11/25. Notably CTH that day revealed small subdural hemorrhage.     His post arrest TTE on 11/26 showed a significantly worse LVEF of 5-10% with an AV that was only minimally opening. Since then he has had improvement in his LV function to ~35-40% and improved pulsatility while tolerating progressive slow ECMO weans. ECMO turndown (note in chart 11/30) was reassuring for ability to decannulate to IABP/inotropes. LHC 12/06 showed closure of his 3 vein grafts with graft to LAD patent though with distal native disease. No coronary intervention was done. IABP placed, ECMO successfully decannulated 12/08 (transfused 2u pRBCs during).     His ARDS has improved and he's now s/p trach (decannulated 1/8). He's now off IABP, removed 12/17, and inotropes, however has previously been unable to tolerate GDMT due to soft BPs and renal failure requiring dialysis. 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 He's afebrile with improving leukocytosis. He has urine output but not enough to stay euvolemic. He's tolerating intermittent HD and remains on midodrine for BP support.     11/24 Transfer from [facility] to SICU c/f STEMI, abd US +GB stones, pericholecystic fluid  11/25 VA ECMO s/p hypoxic respiratory arrest/PEA arrest, CTH 4mm subdural, ERIC  12/5 CVVHD, RUQ US neg   12/6 IABP placed in cath lab, LIMA to LAD patent, RCA and LCx down, CTH subdural decreased in size, persistent pancreatic inflammation   12/7 TTE turndown, EF 30-40%, nml RV, MR mod, mod TR  12/8 ECMO decannulation, aflutter DCCV x 2  12/12 Mental status change- CTH no change, CT perfusion/CTA H/N neg for LVO, +low grade watershed infarct to L MCA  12/16 Mitral clip x 1, TRACH 7 Shiley XLT w/ ENT  12/17 IABP dced  12/26 +clinton BCx   12/27 R groin vac  1/8 trach decannulation   1/10 passed FEEST  1/11 R permacath placed by IR  1/13 Transferred to SDU  1/14 VSS; RSR ; hd as per renal MWF; continue vac to rt groin ; nutritional optimization and PT; [facility] sx called for AV fistula placement on this admission (vein mapping completed 12/22) ; continue capsofungin as per Dr. Rodrigues; endo consulted today for dm management- placed on lantus and admelog  1/15 VSS; RSR 70-80; HD as per renal - k 3.8 today; continue nutritional support and pt; prealbumin 21; await date of AVF from [facility] sx--> pt cleared as per cardiac surgeon Dr. Rodrigues- pt must have AVF with cardiac anesthesia-discussed with [facility] sx fellow Rajeev Ascencio; continue vac to rt groin; bs improved on lantus/ admelog   discharge planning- acute rehab when stable   1/16      HD,   rt groin vac     labs stable  1/17 Increase ambulation.   AVF this week, will need cardiac anesthesia   May transfer to floor  Cont capsofungin   63 YO M with a history of CAD s/p 4v CABG ~2019 in [facility], DM2 (A1c 7.3%), and HTN who initially presented to OSH with abdominal pain and n/v and found to have pancreatitis with lipase > 3000 though also with elevated troponins. CT abdomen revealed acute pancreatitis and his initial LVEF was 40-45%. He developed MSOF with hypoxic respiratory failure requiring intubation and ERIC and went into hypoxic PEA arrest requiring ACLS and due to persistent hypoxia and hypotension on pressors after ROSC was cannulated to VA ECMO (LFV, RFA, no anterograde perfusion catheter) on 11/25. Notably CTH that day revealed small subdural hemorrhage.     His post arrest TTE on 11/26 showed a significantly worse LVEF of 5-10% with an AV that was only minimally opening. Since then he has had improvement in his LV function to ~35-40% and improved pulsatility while tolerating progressive slow ECMO weans. ECMO turndown (note in chart 11/30) was reassuring for ability to decannulate to IABP/inotropes. LHC 12/06 showed closure of his 3 vein grafts with graft to LAD patent though with distal native disease. No coronary intervention was done. IABP placed, ECMO successfully decannulated 12/08 (transfused 2u pRBCs during).     His ARDS has improved and he's now s/p trach (decannulated 1/8). He's now off IABP, removed 12/17, and inotropes, however has previously been unable to tolerate GDMT due to soft BPs and renal failure requiring dialysis. 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 He's afebrile with improving leukocytosis. He has urine output but not enough to stay euvolemic. He's tolerating intermittent HD and remains on midodrine for BP support.     11/24 Transfer from [facility] to SICU c/f STEMI, abd US +GB stones, pericholecystic fluid  11/25 VA ECMO s/p hypoxic respiratory arrest/PEA arrest, CTH 4mm subdural, ERIC  12/5 CVVHD, RUQ US neg   12/6 IABP placed in cath lab, LIMA to LAD patent, RCA and LCx down, CTH subdural decreased in size, persistent pancreatic inflammation   12/7 TTE turndown, EF 30-40%, nml RV, MR mod, mod TR  12/8 ECMO decannulation, aflutter DCCV x 2  12/12 Mental status change- CTH no change, CT perfusion/CTA H/N neg for LVO, +low grade watershed infarct to L MCA  12/16 Mitral clip x 1, TRACH 7 Shiley XLT w/ ENT  12/17 IABP dced  12/26 +clinton BCx   12/27 R groin vac  1/8 trach decannulation   1/10 passed FEEST  1/11 R permacath placed by IR  1/13 Transferred to SDU  1/14 VSS; RSR ; hd as per renal MWF; continue vac to rt groin ; nutritional optimization and PT; [facility] sx called for AV fistula placement on this admission (vein mapping completed 12/22) ; continue capsofungin as per Dr. Rodrigues; endo consulted today for dm management- placed on lantus and admelog  1/15 VSS; RSR 70-80; HD as per renal - k 3.8 today; continue nutritional support and pt; prealbumin 21; await date of AVF from [facility] sx--> pt cleared as per cardiac surgeon Dr. Rodrigues- pt must have AVF with cardiac anesthesia-discussed with [facility] sx fellow Rajeev Ascencio; continue vac to rt groin; bs improved on lantus/ admelog   discharge planning- acute rehab when stable   1/16      HD,   rt groin vac     labs stable  1/17 Increase ambulation.   AVF this week, will need cardiac anesthesia   May transfer to floor  Cont capsofungin

## 2023-01-17 NOTE — PROGRESS NOTE ADULT - SUBJECTIVE AND OBJECTIVE BOX
Chief complaint    Patient is a 62y old  Male who presents with a chief complaint of Acute cholecystitis, gallstone pancreatitis (17 Jan 2023 11:35)   Review of systems  Patient in bed, appears comfortable.    Labs and Fingersticks  CAPILLARY BLOOD GLUCOSE      POCT Blood Glucose.: 148 mg/dL (17 Jan 2023 16:29)  POCT Blood Glucose.: 150 mg/dL (17 Jan 2023 11:03)  POCT Blood Glucose.: 96 mg/dL (17 Jan 2023 07:22)  POCT Blood Glucose.: 105 mg/dL (16 Jan 2023 20:48)      Anion Gap, Serum: 15 (01-17 @ 05:53)  Anion Gap, Serum: 18 *H* (01-16 @ 07:08)      Calcium, Total Serum: 8.9 (01-17 @ 05:53)  Calcium, Total Serum: 9.0 (01-16 @ 07:08)          01-17    135  |  95<L>  |  59<H>  ----------------------------<  106<H>  3.7   |  25  |  4.31<H>    Ca    8.9      17 Jan 2023 05:53  Phos  4.1     01-17  Mg     3.0     01-17                          10.4   9.86  )-----------( 189      ( 17 Jan 2023 05:53 )             33.3     Medications  MEDICATIONS  (STANDING):  aspirin  chewable 81 milliGRAM(s) Oral daily  atorvastatin 80 milliGRAM(s) Oral at bedtime  benzonatate 100 milliGRAM(s) Oral every 8 hours  caspofungin IVPB 50 milliGRAM(s) IV Intermittent every 24 hours  chlorhexidine 2% Cloths 1 Application(s) Topical <User Schedule>  chlorhexidine 4% Liquid 1 Application(s) Topical <User Schedule>  dextrose 50% Injectable 25 Gram(s) IV Push once  dextrose 50% Injectable 12.5 Gram(s) IV Push once  dextrose 50% Injectable 25 Gram(s) IV Push once  dextrose Oral Gel 15 Gram(s) Oral once  epoetin teena-epbx (RETACRIT) Injectable 5000 Unit(s) IV Push <User Schedule>  glucagon  Injectable 1 milliGRAM(s) IntraMuscular once  hemorrhoidal Ointment 1 Application(s) Rectal two times a day  heparin   Injectable 5000 Unit(s) SubCutaneous every 8 hours  hydrocortisone hemorrhoidal Suppository 1 Suppository(s) Rectal daily  insulin glargine Injectable (LANTUS) 18 Unit(s) SubCutaneous at bedtime  insulin lispro (ADMELOG) corrective regimen sliding scale   SubCutaneous three times a day before meals  insulin lispro (ADMELOG) corrective regimen sliding scale   SubCutaneous at bedtime  insulin lispro Injectable (ADMELOG) 6 Unit(s) SubCutaneous three times a day before meals  metoprolol tartrate 25 milliGRAM(s) Oral two times a day  midodrine 20 milliGRAM(s) Oral every 8 hours  multivitamin/minerals/iron Oral Solution (CENTRUM) 15 milliLiter(s) Oral daily  pantoprazole    Tablet 40 milliGRAM(s) Oral before breakfast  polyethylene glycol 3350 17 Gram(s) Oral daily  senna 2 Tablet(s) Oral at bedtime  sevelamer carbonate Powder 1600 milliGRAM(s) Oral three times a day with meals  thiamine 100 milliGRAM(s) Enteral Tube daily  witch hazel Pads 1 Application(s) Topical two times a day      Physical Exam  General: Patient appears comfortable.  Vital Signs Last 12 Hrs  T(F): 98 (01-17-23 @ 14:46), Max: 98.1 (01-17-23 @ 11:03)  HR: 75 (01-17-23 @ 14:46) (71 - 75)  BP: 100/56 (01-17-23 @ 14:46) (96/53 - 108/60)  BP(mean): 72 (01-17-23 @ 14:46) (69 - 78)  RR: 18 (01-17-23 @ 14:46) (18 - 18)  SpO2: 100% (01-17-23 @ 14:46) (97% - 100%)  Neck: No palpable thyroid nodules.  CVS: S1S2, No murmurs  Respiratory: No wheezing, no crepitations  GI: Abdomen soft, non tender.  Musculoskeletal:  edema lower extremities.     Diagnostics    Free Thyroxine, Serum: AM Sched. Collection: 15-Teo-2023 06:00 (01-14 @ 14:23)  Thyroid Stimulating Hormone, Serum: AM Sched. Collection: 15-Teo-2023 06:00 (01-14 @ 14:23)

## 2023-01-17 NOTE — PROGRESS NOTE ADULT - SUBJECTIVE AND OBJECTIVE BOX
VITAL SIGNS    Telemetry:  nsr 70    Vital Signs Last 24 Hrs  T(C): 36.7 (23 @ 11:03), Max: 36.9 (23 @ 03:00)  T(F): 98.1 (23 @ 11:03), Max: 98.4 (23 @ 03:00)  HR: 73 (23 @ 11:03) (71 - 79)  BP: 96/54 (23 @ 11:03) (89/55 - 117/62)  RR: 18 (23 @ 11:03) (17 - 19)  SpO2: 100% (23 @ 11:03) (91% - 100%)                    07:01  -   @ 07:00  --------------------------------------------------------  IN: 240 mL / OUT: 1700 mL / NET: -1460 mL     @ 07:01  -   @ 11:37  --------------------------------------------------------  IN: 240 mL / OUT: 0 mL / NET: 240 mL          Daily     Daily Weight in k.9 (2023 06:16)            CAPILLARY BLOOD GLUCOSE      POCT Blood Glucose.: 150 mg/dL (2023 11:03)  POCT Blood Glucose.: 96 mg/dL (2023 07:22)  POCT Blood Glucose.: 105 mg/dL (2023 20:48)  POCT Blood Glucose.: 160 mg/dL (2023 16:29)  POCT Blood Glucose.: 168 mg/dL (2023 12:39)            Drains:     MS         [  ] Drainage:                 L Pleural  [  ]  Drainage:                R Pleural  [  ]  Drainage:    Pacing Wires        [  ]   Settings:                                  Isolated  [ x ]    Coumadin    [ ] YES          [  ]      NO                                   PHYSICAL EXAM        Neurology: alert and oriented x 3, nonfocal, no gross deficits  CV : s1 s2 RRR  Sternal Wound :  CDI , Stable  Lungs: cta  Abdomen: soft, nontender, nondistended, positive bowel sounds, last bowel movement                       chest tubes  :    voiding   Extremities:     trace edema , healed svg site edema   /  -   calve tenderness ,     r groin vac           acetaminophen    Suspension .. 650 milliGRAM(s) Oral every 6 hours PRN  aspirin  chewable 81 milliGRAM(s) Oral daily  atorvastatin 80 milliGRAM(s) Oral at bedtime  benzonatate 100 milliGRAM(s) Oral every 8 hours  caspofungin IVPB 50 milliGRAM(s) IV Intermittent every 24 hours  chlorhexidine 2% Cloths 1 Application(s) Topical <User Schedule>  chlorhexidine 4% Liquid 1 Application(s) Topical <User Schedule>  dextrose 50% Injectable 25 Gram(s) IV Push once  dextrose 50% Injectable 12.5 Gram(s) IV Push once  dextrose 50% Injectable 25 Gram(s) IV Push once  dextrose Oral Gel 15 Gram(s) Oral once  epoetin teena-epbx (RETACRIT) Injectable 5000 Unit(s) IV Push <User Schedule>  glucagon  Injectable 1 milliGRAM(s) IntraMuscular once  guaiFENesin Oral Liquid (Sugar-Free) 200 milliGRAM(s) Oral every 6 hours PRN  heparin   Injectable 5000 Unit(s) SubCutaneous every 8 hours  hydrocodone/homatropine Syrup 5 milliLiter(s) Oral every 8 hours PRN  insulin glargine Injectable (LANTUS) 18 Unit(s) SubCutaneous at bedtime  insulin lispro (ADMELOG) corrective regimen sliding scale   SubCutaneous three times a day before meals  insulin lispro (ADMELOG) corrective regimen sliding scale   SubCutaneous at bedtime  insulin lispro Injectable (ADMELOG) 6 Unit(s) SubCutaneous three times a day before meals  metoprolol tartrate 25 milliGRAM(s) Oral two times a day  midodrine 20 milliGRAM(s) Oral every 8 hours  multivitamin/minerals/iron Oral Solution (CENTRUM) 15 milliLiter(s) Oral daily  pantoprazole    Tablet 40 milliGRAM(s) Oral before breakfast  polyethylene glycol 3350 17 Gram(s) Oral daily  senna 2 Tablet(s) Oral at bedtime  sevelamer carbonate Powder 1600 milliGRAM(s) Oral three times a day with meals  simethicone 80 milliGRAM(s) Chew daily PRN  sodium chloride 0.9% lock flush 10 milliLiter(s) IV Push every 1 hour PRN  thiamine 100 milliGRAM(s) Enteral Tube daily  witch hazel Pads 1 Application(s) Topical two times a day                    Physical Therapy Rec:   Home  [  ]   Home w/ PT  [  ]  Rehab  [  ]  Discussed with Cardiothoracic Team at AM rounds.

## 2023-01-17 NOTE — PROGRESS NOTE ADULT - PROBLEM SELECTOR PLAN 1
Will continue current insulin regimen for now. Will continue monitoring  blood sugars, will Follow up.  Patient counseled for compliance with consistent low carb diet.  Suggest to DC home on Lantus 18u qhs, Tradjenta 5mg PO daily. Discussed with patient/family.  .

## 2023-01-17 NOTE — PROGRESS NOTE ADULT - SUBJECTIVE AND OBJECTIVE BOX
no new complaints   wife at bedside     REVIEW OF SYSTEMS  Constitutional - No fever,  No fatigue  HEENT - No vertigo, No neck pain  Neurological - No headaches, No memory loss, No loss of strength, No numbness, No tremors  Skin - No rashes, No lesions   Musculoskeletal - No joint pain, No joint swelling, No muscle pain  Psychiatric - No depression, No anxiety    FUNCTIONAL PROGRESS  1/12 SLP  mild pharyngeal dysphagia  regular/thin     1/17 PT  transfers contact guard with RW  gait contact guard/min assist with RW x 25 feet  standing rest break       VITALS  T(C): 36.7 (01-17-23 @ 11:03), Max: 36.9 (01-17-23 @ 03:00)  HR: 73 (01-17-23 @ 11:03) (71 - 79)  BP: 96/54 (01-17-23 @ 11:03) (89/55 - 117/62)  RR: 18 (01-17-23 @ 11:03) (17 - 19)  SpO2: 100% (01-17-23 @ 11:03) (91% - 100%)  Wt(kg): --    MEDICATIONS   acetaminophen    Suspension .. 650 milliGRAM(s) every 6 hours PRN  aspirin  chewable 81 milliGRAM(s) daily  atorvastatin 80 milliGRAM(s) at bedtime  benzonatate 100 milliGRAM(s) every 8 hours  caspofungin IVPB 50 milliGRAM(s) every 24 hours  chlorhexidine 2% Cloths 1 Application(s) <User Schedule>  chlorhexidine 4% Liquid 1 Application(s) <User Schedule>  dextrose 50% Injectable 25 Gram(s) once  dextrose 50% Injectable 12.5 Gram(s) once  dextrose 50% Injectable 25 Gram(s) once  dextrose Oral Gel 15 Gram(s) once  epoetin teena-epbx (RETACRIT) Injectable 5000 Unit(s) <User Schedule>  glucagon  Injectable 1 milliGRAM(s) once  guaiFENesin Oral Liquid (Sugar-Free) 200 milliGRAM(s) every 6 hours PRN  heparin   Injectable 5000 Unit(s) every 8 hours  hydrocodone/homatropine Syrup 5 milliLiter(s) every 8 hours PRN  insulin glargine Injectable (LANTUS) 18 Unit(s) at bedtime  insulin lispro (ADMELOG) corrective regimen sliding scale   three times a day before meals  insulin lispro (ADMELOG) corrective regimen sliding scale   at bedtime  insulin lispro Injectable (ADMELOG) 6 Unit(s) three times a day before meals  metoprolol tartrate 25 milliGRAM(s) two times a day  midodrine 20 milliGRAM(s) every 8 hours  multivitamin/minerals/iron Oral Solution (CENTRUM) 15 milliLiter(s) daily  pantoprazole    Tablet 40 milliGRAM(s) before breakfast  polyethylene glycol 3350 17 Gram(s) daily  senna 2 Tablet(s) at bedtime  sevelamer carbonate Powder 1600 milliGRAM(s) three times a day with meals  simethicone 80 milliGRAM(s) daily PRN  sodium chloride 0.9% lock flush 10 milliLiter(s) every 1 hour PRN  thiamine 100 milliGRAM(s) daily  witch hazel Pads 1 Application(s) two times a day      RECENT LABS - Reviewed                        10.4   9.86  )-----------( 189      ( 17 Jan 2023 05:53 )             33.3     01-17    135  |  95<L>  |  59<H>  ----------------------------<  106<H>  3.7   |  25  |  4.31<H>    Ca    8.9      17 Jan 2023 05:53  Phos  4.1     01-17  Mg     3.0     01-17      ----------------------------------------------------------------------------------------  PHYSICAL EXAM  Constitutional - NAD, Comfortable, in chair   Chest - breathing comfortably on room air   Cardiovascular - S1S2   Abdomen - Soft   Extremities - No C/C/E, No calf tenderness   Neurologic Exam -                    Cognitive - Awake, Alert     Communication - follows commands         Motor - moves all ext, 5/5      Sensory - Intact to LT    Psychiatric - Mood stable, Affect WNL  ----------------------------------------------------------------------------------------  ASSESSMENT/PLAN  62yMale h/o CAD s/p CABG 2019 DM, HTN with functional deficits after cardiogenic shock, with debility  SDH on CT, stable   s/p mitral clip 12/16  gall stone pancreatitis, no plan for intervention   [facility], s/p RIJ permacath, awaiting AVF, on HD   candidemia, on caspofungin, meropenem   Pain - Tylenol  DVT PPX - SCDs  Rehab - Will continue to follow for ongoing rehab needs and recommendations.   Patient needs OT evaluation  he is making progress with bedside therapy, now contact guard/min assist    Recommend ACUTE inpatient rehabilitation for the functional deficits consisting of 3 hours of therapy/day & 24 hour RN/daily PMR physician for comorbid medical management. Patient will be able to tolerate 3 hours a day.              no new complaints   wife at bedside     REVIEW OF SYSTEMS  Constitutional - No fever,  No fatigue  HEENT - No vertigo, No neck pain  Neurological - No headaches, No memory loss, No loss of strength, No numbness, No tremors  Skin - No rashes, No lesions   Musculoskeletal - No joint pain, No joint swelling, No muscle pain  Psychiatric - No depression, No anxiety    FUNCTIONAL PROGRESS  1/12 SLP  mild pharyngeal dysphagia  regular/thin     1/17 PT  transfers contact guard with RW  gait contact guard/min assist with RW x 25 feet  standing rest break       VITALS  T(C): 36.7 (01-17-23 @ 11:03), Max: 36.9 (01-17-23 @ 03:00)  HR: 73 (01-17-23 @ 11:03) (71 - 79)  BP: 96/54 (01-17-23 @ 11:03) (89/55 - 117/62)  RR: 18 (01-17-23 @ 11:03) (17 - 19)  SpO2: 100% (01-17-23 @ 11:03) (91% - 100%)  Wt(kg): --    MEDICATIONS   acetaminophen    Suspension .. 650 milliGRAM(s) every 6 hours PRN  aspirin  chewable 81 milliGRAM(s) daily  atorvastatin 80 milliGRAM(s) at bedtime  benzonatate 100 milliGRAM(s) every 8 hours  caspofungin IVPB 50 milliGRAM(s) every 24 hours  chlorhexidine 2% Cloths 1 Application(s) <User Schedule>  chlorhexidine 4% Liquid 1 Application(s) <User Schedule>  dextrose 50% Injectable 25 Gram(s) once  dextrose 50% Injectable 12.5 Gram(s) once  dextrose 50% Injectable 25 Gram(s) once  dextrose Oral Gel 15 Gram(s) once  epoetin teena-epbx (RETACRIT) Injectable 5000 Unit(s) <User Schedule>  glucagon  Injectable 1 milliGRAM(s) once  guaiFENesin Oral Liquid (Sugar-Free) 200 milliGRAM(s) every 6 hours PRN  heparin   Injectable 5000 Unit(s) every 8 hours  hydrocodone/homatropine Syrup 5 milliLiter(s) every 8 hours PRN  insulin glargine Injectable (LANTUS) 18 Unit(s) at bedtime  insulin lispro (ADMELOG) corrective regimen sliding scale   three times a day before meals  insulin lispro (ADMELOG) corrective regimen sliding scale   at bedtime  insulin lispro Injectable (ADMELOG) 6 Unit(s) three times a day before meals  metoprolol tartrate 25 milliGRAM(s) two times a day  midodrine 20 milliGRAM(s) every 8 hours  multivitamin/minerals/iron Oral Solution (CENTRUM) 15 milliLiter(s) daily  pantoprazole    Tablet 40 milliGRAM(s) before breakfast  polyethylene glycol 3350 17 Gram(s) daily  senna 2 Tablet(s) at bedtime  sevelamer carbonate Powder 1600 milliGRAM(s) three times a day with meals  simethicone 80 milliGRAM(s) daily PRN  sodium chloride 0.9% lock flush 10 milliLiter(s) every 1 hour PRN  thiamine 100 milliGRAM(s) daily  witch hazel Pads 1 Application(s) two times a day      RECENT LABS - Reviewed                        10.4   9.86  )-----------( 189      ( 17 Jan 2023 05:53 )             33.3     01-17    135  |  95<L>  |  59<H>  ----------------------------<  106<H>  3.7   |  25  |  4.31<H>    Ca    8.9      17 Jan 2023 05:53  Phos  4.1     01-17  Mg     3.0     01-17      ----------------------------------------------------------------------------------------  PHYSICAL EXAM  Constitutional - NAD, Comfortable, in chair   Chest - breathing comfortably on room air   Cardiovascular - S1S2   Abdomen - Soft   Extremities - No C/C/E, No calf tenderness   Neurologic Exam -                    Cognitive - Awake, Alert     Communication - follows commands         Motor - moves all ext, 5/5      Sensory - Intact to LT    Psychiatric - Mood stable, Affect WNL  ----------------------------------------------------------------------------------------  ASSESSMENT/PLAN  62yMale h/o CAD s/p CABG 2019 DM, HTN with functional deficits after cardiogenic shock, with debility  SDH on CT, stable   s/p mitral clip 12/16  gall stone pancreatitis, no plan for intervention   [facility], s/p RIJ permacath, awaiting AVF, on HD   candidemia, on caspofungin, meropenem   Pain - Tylenol  DVT PPX - SCDs  Rehab - Will continue to follow for ongoing rehab needs and recommendations.   Patient needs OT evaluation  he is making progress with bedside therapy, now contact guard/min assist    Recommend ACUTE inpatient rehabilitation for the functional deficits consisting of 3 hours of therapy/day & 24 hour RN/daily PMR physician for comorbid medical management. Patient will be able to tolerate 3 hours a day.

## 2023-01-17 NOTE — PROGRESS NOTE ADULT - PROBLEM SELECTOR PLAN 3
- was on CVVH now on iHD   - appreciate nephrology recommendations   - avoid nephrotoxins  - s/p RIJ permacath 1/11  - [facility] sx called for AV fistula placement on this admission (vein mapping completed   await date of AVF from [facility] sx--> pt cleared as per cardiac surgeon Dr. Rodrigues- pt must have AVF with cardiac anesthesia-discussed with [facility] sx fellow Rajeev Ascencio - was on CVVH now on iHD   - appreciate nephrology recommendations   - avoid nephrotoxins  - s/p RIJ permacath 1/11  - [facility] sx called for AV fistula placement on this admission (vein mapping completed   await date of AVF from [facility] sx--> pt cleared as per cardiac surgeon Dr. Rodrigues- pt must have AVF with cardiac anesthesia-discussed with [facility] sx fellow Rajeev Ascencio - was on CVVH now on iHD   - appreciate nephrology recommendations   - avoid nephrotoxins  - s/p RIJ permacath 1/11  - [facility] sx called for AV fistula placement on this admission (vein mapping completed   await date of AVF from [facility] sx--> pt cleared as per cardiac surgeon Dr. Rodrigues- pt must have AVF with cardiac anesthesia-discussed with [facility] sx fellow Rajeev Ascencio

## 2023-01-17 NOTE — PROGRESS NOTE ADULT - ASSESSMENT
Assessment  DMT2: 62y Male with DM T2 with hyperglycemia admitted with CAD, patient was on NPH and insulin coverage,  sugars improving, eating meals.  Patient is high risk with high level decision making due to uncontrolled diabetes, has increased risk for complications.   CAD: S/P CABG, on medications, monitored, asymptomatic.  HTN: On antihypertensive medications, monitored, asymptomatic.              Nayeli Quiles MD  Cell:  [phone]  Office: [phone]               Assessment  DMT2: 62y Male with DM T2 with hyperglycemia admitted with CAD, patient was on NPH and insulin coverage,  sugars improving, eating meals.  Patient is high risk with high level decision making due to uncontrolled diabetes, has increased risk for complications.   CAD: S/P CABG, on medications, monitored, asymptomatic.  HTN: On antihypertensive medications, monitored, asymptomatic.              Nayeli Quiles MD  Cell:  [phone]  Office: [phone]               Assessment  DMT2: 62y Male with DM T2 with hyperglycemia admitted with CAD, patient was on NPH and insulin coverage,  sugars improving, eating meals.  Patient is high risk with high level decision making due to uncontrolled diabetes, has increased risk for complications.   CAD: S/P CABG, on medications, monitored, asymptomatic.  HTN: On antihypertensive medications, monitored, asymptomatic.              Nayeli Quiles MD  Cell:  [phone]  Office: [phone]

## 2023-01-18 DIAGNOSIS — D64.9 ANEMIA, UNSPECIFIED: ICD-10-CM

## 2023-01-18 DIAGNOSIS — E83.39 OTHER DISORDERS OF PHOSPHORUS METABOLISM: ICD-10-CM

## 2023-01-18 DIAGNOSIS — D72.829 ELEVATED WHITE BLOOD CELL COUNT, UNSPECIFIED: ICD-10-CM

## 2023-01-18 LAB
ALBUMIN SERPL ELPH-MCNC: 3.6 G/DL — SIGNIFICANT CHANGE UP (ref 3.3–5)
ALP SERPL-CCNC: 79 U/L — SIGNIFICANT CHANGE UP (ref 40–120)
ALT FLD-CCNC: 12 U/L — SIGNIFICANT CHANGE UP (ref 10–45)
ANION GAP SERPL CALC-SCNC: 15 MMOL/L — SIGNIFICANT CHANGE UP (ref 5–17)
AST SERPL-CCNC: 18 U/L — SIGNIFICANT CHANGE UP (ref 10–40)
BILIRUB SERPL-MCNC: 0.4 MG/DL — SIGNIFICANT CHANGE UP (ref 0.2–1.2)
BUN SERPL-MCNC: 80 MG/DL — HIGH (ref 7–23)
CALCIUM SERPL-MCNC: 9.2 MG/DL — SIGNIFICANT CHANGE UP (ref 8.4–10.5)
CHLORIDE SERPL-SCNC: 93 MMOL/L — LOW (ref 96–108)
CO2 SERPL-SCNC: 25 MMOL/L — SIGNIFICANT CHANGE UP (ref 22–31)
CREAT SERPL-MCNC: 4.6 MG/DL — HIGH (ref 0.5–1.3)
EGFR: 14 ML/MIN/1.73M2 — LOW
GLUCOSE BLDC GLUCOMTR-MCNC: 125 MG/DL — HIGH (ref 70–99)
GLUCOSE BLDC GLUCOMTR-MCNC: 142 MG/DL — HIGH (ref 70–99)
GLUCOSE BLDC GLUCOMTR-MCNC: 211 MG/DL — HIGH (ref 70–99)
GLUCOSE SERPL-MCNC: 131 MG/DL — HIGH (ref 70–99)
HCT VFR BLD CALC: 33.8 % — LOW (ref 39–50)
HGB BLD-MCNC: 10.2 G/DL — LOW (ref 13–17)
MAGNESIUM SERPL-MCNC: 3.2 MG/DL — HIGH (ref 1.6–2.6)
MCHC RBC-ENTMCNC: 30.2 GM/DL — LOW (ref 32–36)
MCHC RBC-ENTMCNC: 30.2 PG — SIGNIFICANT CHANGE UP (ref 27–34)
MCV RBC AUTO: 100 FL — SIGNIFICANT CHANGE UP (ref 80–100)
NRBC # BLD: 0 /100 WBCS — SIGNIFICANT CHANGE UP (ref 0–0)
PHOSPHATE SERPL-MCNC: 4 MG/DL — SIGNIFICANT CHANGE UP (ref 2.5–4.5)
PLATELET # BLD AUTO: 225 K/UL — SIGNIFICANT CHANGE UP (ref 150–400)
POTASSIUM SERPL-MCNC: 4.1 MMOL/L — SIGNIFICANT CHANGE UP (ref 3.5–5.3)
POTASSIUM SERPL-SCNC: 4.1 MMOL/L — SIGNIFICANT CHANGE UP (ref 3.5–5.3)
PROT SERPL-MCNC: 7.8 G/DL — SIGNIFICANT CHANGE UP (ref 6–8.3)
RBC # BLD: 3.38 M/UL — LOW (ref 4.2–5.8)
RBC # FLD: 18.9 % — HIGH (ref 10.3–14.5)
SODIUM SERPL-SCNC: 133 MMOL/L — LOW (ref 135–145)
WBC # BLD: 12.84 K/UL — HIGH (ref 3.8–10.5)
WBC # FLD AUTO: 12.84 K/UL — HIGH (ref 3.8–10.5)

## 2023-01-18 PROCEDURE — 93010 ELECTROCARDIOGRAM REPORT: CPT

## 2023-01-18 PROCEDURE — 99221 1ST HOSP IP/OBS SF/LOW 40: CPT

## 2023-01-18 PROCEDURE — 99232 SBSQ HOSP IP/OBS MODERATE 35: CPT | Mod: 24

## 2023-01-18 PROCEDURE — 99233 SBSQ HOSP IP/OBS HIGH 50: CPT

## 2023-01-18 PROCEDURE — 93306 TTE W/DOPPLER COMPLETE: CPT | Mod: 26

## 2023-01-18 PROCEDURE — 71045 X-RAY EXAM CHEST 1 VIEW: CPT | Mod: 26

## 2023-01-18 RX ORDER — INSULIN GLARGINE 100 [IU]/ML
20 INJECTION, SOLUTION SUBCUTANEOUS AT BEDTIME
Refills: 0 | Status: DISCONTINUED | OUTPATIENT
Start: 2023-01-18 | End: 2023-01-20

## 2023-01-18 RX ORDER — ACETAMINOPHEN 500 MG
650 TABLET ORAL ONCE
Refills: 0 | Status: COMPLETED | OUTPATIENT
Start: 2023-01-18 | End: 2023-01-18

## 2023-01-18 RX ORDER — INSULIN LISPRO 100/ML
8 VIAL (ML) SUBCUTANEOUS
Refills: 0 | Status: DISCONTINUED | OUTPATIENT
Start: 2023-01-18 | End: 2023-01-20

## 2023-01-18 RX ADMIN — Medication 2: at 11:07

## 2023-01-18 RX ADMIN — ATORVASTATIN CALCIUM 80 MILLIGRAM(S): 80 TABLET, FILM COATED ORAL at 21:46

## 2023-01-18 RX ADMIN — Medication 15 MILLILITER(S): at 11:07

## 2023-01-18 RX ADMIN — CHLORHEXIDINE GLUCONATE 1 APPLICATION(S): 213 SOLUTION TOPICAL at 05:06

## 2023-01-18 RX ADMIN — CHLORHEXIDINE GLUCONATE 1 APPLICATION(S): 213 SOLUTION TOPICAL at 05:05

## 2023-01-18 RX ADMIN — HEPARIN SODIUM 5000 UNIT(S): 5000 INJECTION INTRAVENOUS; SUBCUTANEOUS at 00:02

## 2023-01-18 RX ADMIN — SEVELAMER CARBONATE 1600 MILLIGRAM(S): 2400 POWDER, FOR SUSPENSION ORAL at 11:10

## 2023-01-18 RX ADMIN — SIMETHICONE 80 MILLIGRAM(S): 80 TABLET, CHEWABLE ORAL at 15:13

## 2023-01-18 RX ADMIN — HEPARIN SODIUM 5000 UNIT(S): 5000 INJECTION INTRAVENOUS; SUBCUTANEOUS at 09:02

## 2023-01-18 RX ADMIN — AER TRAVELER 1 APPLICATION(S): 0.5 SOLUTION RECTAL; TOPICAL at 17:12

## 2023-01-18 RX ADMIN — CASPOFUNGIN ACETATE 260 MILLIGRAM(S): 7 INJECTION, POWDER, LYOPHILIZED, FOR SOLUTION INTRAVENOUS at 10:05

## 2023-01-18 RX ADMIN — Medication 6 UNIT(S): at 07:34

## 2023-01-18 RX ADMIN — Medication 81 MILLIGRAM(S): at 11:07

## 2023-01-18 RX ADMIN — Medication 1 SUPPOSITORY(S): at 11:06

## 2023-01-18 RX ADMIN — SEVELAMER CARBONATE 1600 MILLIGRAM(S): 2400 POWDER, FOR SUSPENSION ORAL at 17:05

## 2023-01-18 RX ADMIN — POLYETHYLENE GLYCOL 3350 17 GRAM(S): 17 POWDER, FOR SOLUTION ORAL at 11:07

## 2023-01-18 RX ADMIN — SEVELAMER CARBONATE 1600 MILLIGRAM(S): 2400 POWDER, FOR SUSPENSION ORAL at 09:01

## 2023-01-18 RX ADMIN — PANTOPRAZOLE SODIUM 40 MILLIGRAM(S): 20 TABLET, DELAYED RELEASE ORAL at 05:43

## 2023-01-18 RX ADMIN — ERYTHROPOIETIN 5000 UNIT(S): 10000 INJECTION, SOLUTION INTRAVENOUS; SUBCUTANEOUS at 13:59

## 2023-01-18 RX ADMIN — Medication 25 MILLIGRAM(S): at 05:22

## 2023-01-18 RX ADMIN — Medication 100 MILLIGRAM(S): at 11:06

## 2023-01-18 RX ADMIN — Medication 650 MILLIGRAM(S): at 14:00

## 2023-01-18 RX ADMIN — Medication 100 MILLIGRAM(S): at 21:46

## 2023-01-18 RX ADMIN — MIDODRINE HYDROCHLORIDE 20 MILLIGRAM(S): 2.5 TABLET ORAL at 05:21

## 2023-01-18 RX ADMIN — INSULIN GLARGINE 20 UNIT(S): 100 INJECTION, SOLUTION SUBCUTANEOUS at 21:45

## 2023-01-18 RX ADMIN — Medication 650 MILLIGRAM(S): at 13:25

## 2023-01-18 RX ADMIN — AER TRAVELER 1 APPLICATION(S): 0.5 SOLUTION RECTAL; TOPICAL at 05:30

## 2023-01-18 RX ADMIN — Medication 650 MILLIGRAM(S): at 23:43

## 2023-01-18 RX ADMIN — Medication 25 MILLIGRAM(S): at 17:06

## 2023-01-18 RX ADMIN — Medication 6 UNIT(S): at 11:05

## 2023-01-18 RX ADMIN — Medication 8 UNIT(S): at 17:05

## 2023-01-18 RX ADMIN — PHENYLEPHRINE-SHARK LIVER OIL-MINERAL OIL-PETROLATUM RECTAL OINTMENT 1 APPLICATION(S): at 17:12

## 2023-01-18 RX ADMIN — Medication 100 MILLIGRAM(S): at 05:21

## 2023-01-18 RX ADMIN — Medication 200 MILLIGRAM(S): at 09:47

## 2023-01-18 RX ADMIN — Medication 100 MILLIGRAM(S): at 13:25

## 2023-01-18 RX ADMIN — MIDODRINE HYDROCHLORIDE 20 MILLIGRAM(S): 2.5 TABLET ORAL at 21:46

## 2023-01-18 NOTE — PROGRESS NOTE ADULT - ASSESSMENT
Assessment  DMT2: 62y Male with DM T2 A1C 7.3 with hyperglycemia admitted with CAD, patient was on NPH and insulin coverage,  sugars improving, eating meals.    CAD: S/P CABG, on medications, monitored, asymptomatic.  HTN: On antihypertensive medications, monitored, asymptomatic.              Nayeli Quiles MD  Cell:  [phone]  Office: [phone]               Assessment  DMT2: 62y Male with DM T2 A1C 7.3 with hyperglycemia admitted with CAD, patient was on NPH and insulin coverage,  sugars improving, eating meals.    CAD: S/P CABG, on medications, monitored, asymptomatic.  HTN: On antihypertensive medications, monitored, asymptomatic.              Nayeli Quiles MD  Cell:  [phone]  Office: [phone]               Assessment  DMT2: 62y Male with DM T2 A1C 7.3 with hyperglycemia admitted with CAD, patient was on NPH and insulin coverage,  sugars improving, eating meals.    CAD: S/P CABG, on medications, monitored, asymptomatic.  HTN: On antihypertensive medications, monitored, asymptomatic.              Nayeli Quiles MD  Cell:  [phone]  Office: [phone]               Assessment  DMT2: 62y Male with DM T2 A1C 7.3 with hyperglycemia admitted with CAD, patient was on NPH and insulin coverage, sugars improving, eating meals.    CAD: S/P CABG, on medications, monitored, asymptomatic.  HTN: On antihypertensive medications, monitored, asymptomatic.              Nayeli Quiles MD  Cell:  [phone]  Office: [phone]               Assessment  DMT2: 62y Male with DM T2 A1C 7.3 with hyperglycemia admitted with CAD, patient was on NPH and insulin coverage, sugars improving, eating meals.    CAD: S/P CABG, on medications, monitored, asymptomatic.  HTN: On antihypertensive medications, monitored, asymptomatic.              Nayeli Quiles MD  Cell:  [phone]  Office: [phone]               Assessment  DMT2: 62y Male with DM T2 A1C 7.3 with hyperglycemia admitted with CAD, patient was on NPH and insulin coverage, sugars improving, eating meals.    CAD: S/P CABG, on medications, monitored, asymptomatic.  HTN: On antihypertensive medications, monitored, asymptomatic.              Nayeli Quiles MD  Cell:  [phone]  Office: [phone]

## 2023-01-18 NOTE — PROGRESS NOTE ADULT - PROBLEM SELECTOR PLAN 3
- was on CVVH now on iHD   - appreciate nephrology recommendations   - avoid nephrotoxins  - s/p RIJ permacath 1/11  - [facility] sx called for AV fistula placement on this admission (vein mapping completed   await date of AVF from [facility] sx--> pt cleared as per cardiac surgeon Dr. Rodrigues- pt must have AVF with cardiac anesthesia-discussed with [facility] sx fellow Rajeev Ascencio - was on CVVH now on iHD   - appreciate nephrology recommendations   - avoid nephrotoxins  - s/p RIJ permacath 1/11  - [facility] sx called for AV fistula placement on this admission (vein mapping completed   await date of AVF from [facility] sx--> pt cleared as per cardiac surgeon Dr. Rodrigues- pt must have AVF with cardiac anesthesia-discussed with [facility] sx fellow Rajeev Ascencio - was on CVVH now on iHD   - appreciate nephrology recommendations   - avoid nephrotoxins  - s/p RIJ permacath 1/11  - [facility] sx called for AV fistula placement on this admission (vein mapping completed   await date of AVF from [facility] sx--> pt cleared as per cardiac surgeon Dr. Rodrigues- pt must have AVF with cardiac anesthesia-discussed with [facility] sx fellow Rajeev Ascencio

## 2023-01-18 NOTE — PROGRESS NOTE ADULT - PROBLEM SELECTOR PLAN 1
Pt with ERIC/ ATN in the setting of STEMI, cardiac arrest & cardiogenic shock. SCr on arrival was 2.15; trended down to hector 1.6 on 11/25; slowly trended up & increased to 3.95 11/28. Pt initiated on CRRT/CVVHDF to optimize volume and electrolytes on 12/5/22. Pt likely with ATN. Pt underwent decannulation of ECMO on 12/8/22 and was taken off CRRT.   Pt reinitiated on CRRT 12/9/22 for volume overload, that was continued until 12/19 & was initiated on iHD thereafter.    s/p RIJ tunneled HD cath on 1/11. Had Last HD session 1/16 with UF that he tolerated well. Labs reviewed. Pt remains oliguric although is reporting improved UO. Labs reviewed. Plan for HD today. Continue to monitor for renal recovery. Monitor labs and urine output. Avoid any potential nephrotoxins.  Pt. would want to continue HD at [facility] unit as outpatient. Pt with ERIC/ ATN in the setting of STEMI, cardiac arrest & cardiogenic shock. SCr on arrival was 2.15; trended down to hector 1.6 on 11/25; slowly trended up & increased to 3.95 11/28. Pt initiated on CRRT/CVVHDF to optimize volume and electrolytes on 12/5/22. Pt likely with ATN. Pt underwent decannulation of ECMO on 12/8/22 and was taken off CRRT.   Pt reinitiated on CRRT 12/9/22 for volume overload, that was continued until 12/19 & was initiated on iHD thereafter.    s/p RIJ tunneled HD cath on 1/11. Had Last HD session 1/16 with UF that he tolerated well. Labs reviewed. Pt remains oliguric although is reporting improved UO. Labs reviewed. Plan for HD today. Continue to monitor for renal recovery. Monitor labs and urine output. Avoid any potential nephrotoxins.  Pt. would want to continue HD at [facility] unit as outpatient. Pt with ERIC/ ATN in the setting of STEMI, cardiac arrest & cardiogenic shock. SCr on arrival was 2.15; trended down to hector 1.6 on 11/25; slowly trended up & increased to 3.95 11/28. Pt initiated on CRRT/CVVHDF to optimize volume and electrolytes on 12/5/22. Pt likely with ATN. Pt underwent decannulation of ECMO on 12/8/22 and was taken off CRRT.   Pt reinitiated on CRRT 12/9/22 for volume overload, that was continued until 12/19 & was initiated on iHD thereafter.    s/p RIJ tunneled HD cath on 1/11. Had Last HD session 1/16 with UF that he tolerated well. Labs reviewed. Pt remains oliguric although is reporting improved UO. Labs reviewed. Plan for HD today. Continue to monitor for renal recovery. Monitor labs and urine output. Avoid any potential nephrotoxins.  Pt. would want to continue HD at [facility] unit as outpatient.

## 2023-01-18 NOTE — CONSULT NOTE ADULT - ASSESSMENT
PLAN:  - bowel regimen for posterior fissure:  - Encourage increased PO fluid intake  - Daily fiber, stool softener  - Hydrocortisone 2.5% TID x 7 days  - TID sitz baths    - If no improvement, please reconsult    Discussed with Dr. Rogers    Red Surgery  [id number]       PLAN:  - bowel regimen for posterior fissure:  - Encourage increased PO fluid intake  - Daily fiber, stool softener  - Hydrocortisone 2.5% TID x 7 days  - TID sitz baths    - If no improvement, please reconsult    Discussed with Dr. Rogers    Red Surgery  [id number]       PLAN:  - bowel regimen for posterior fissure:  - Encourage increased PO fluid intake  - Daily fiber, stool softener  - Hydrocortisone 2.5% TID x 7 days  - TID sitz baths    - If no improvement, please reconsult    Discussed with Dr. Rogers    Red Surgery  [id number]   62M with prolonged hospitalization, originally presented with acute pancreatitis, developing MSOF with hypoxic respiratory failure requiring intubation and ERIC. Hypoxic PEA arrest requiring ACLS and VA ECMO 11/25-12/08. Trach 12/16-1/8. Continued intermittent HD and remains on midodrine for BP support. Colorectal surgery consulted for new onset BRBPR. 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    PLAN:  - Bowel regimen for posterior fissure:  - Encourage increased PO fluid intake  - Daily fiber, stool softener  - Hydrocortisone 2.5% TID x 7 days  - TID sitz baths    - If no improvement, please reconsult for topical CCB    Discussed with Dr. Rogers    Red Surgery  [id number]   62M with prolonged hospitalization, originally presented with acute pancreatitis, developing MSOF with hypoxic respiratory failure requiring intubation and ERIC. Hypoxic PEA arrest requiring ACLS and VA ECMO 11/25-12/08. Trach 12/16-1/8. Continued intermittent HD and remains on midodrine for BP support. Colorectal surgery consulted for new onset BRBPR. 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    PLAN:  - Bowel regimen for posterior fissure:  - Encourage increased PO fluid intake  - Daily fiber, stool softener  - Hydrocortisone 2.5% TID x 7 days  - TID sitz baths    - If no improvement, please reconsult for topical CCB    Discussed with Dr. Rogers    Red Surgery  [id number]   62M with prolonged hospitalization, originally presented with acute pancreatitis, developing MSOF with hypoxic respiratory failure requiring intubation and ERIC. Hypoxic PEA arrest requiring ACLS and VA ECMO 11/25-12/08. Trach 12/16-1/8. Continued intermittent HD and remains on midodrine for BP support. Colorectal surgery consulted for new onset BRBPR. 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    PLAN:  - Bowel regimen for posterior fissure:  - Encourage increased PO fluid intake  - Daily fiber, stool softener  - Hydrocortisone 2.5% TID x 7 days  - TID sitz baths    - If no improvement, please reconsult for topical CCB    Discussed with Dr. Rogers    Red Surgery  [id number]

## 2023-01-18 NOTE — PROGRESS NOTE ADULT - SUBJECTIVE AND OBJECTIVE BOX
VITAL SIGNS    Telemetry:  nsr 70    Vital Signs Last 24 Hrs  T(C): 36.8 (23 @ 06:52), Max: 36.9 (23 @ 23:00)  T(F): 98.3 (23 @ 06:52), Max: 98.4 (23 @ 23:00)  HR: 73 (23 @ 06:52) (73 - 78)  BP: 112/61 (23 @ 06:52) (96/53 - 112/61)  RR: 18 (23 @ 06:52) (18 - 19)  SpO2: 100% (23 @ 06:52) (98% - 100%)                    07:01  -   @ 07:00  --------------------------------------------------------  IN: 990 mL / OUT: 400 mL / NET: 590 mL     07:01  -   @ 09:53  --------------------------------------------------------  IN: 120 mL / OUT: 0 mL / NET: 120 mL          Daily     Daily Weight in k.7 (2023 05:48)            CAPILLARY BLOOD GLUCOSE      POCT Blood Glucose.: 142 mg/dL (2023 07:08)  POCT Blood Glucose.: 159 mg/dL (2023 20:59)  POCT Blood Glucose.: 148 mg/dL (2023 16:29)  POCT Blood Glucose.: 150 mg/dL (2023 11:03)                Pacing Wires        [  ]   Settings:                                  Isolated  [ ]    Coumadin    [ ] YES          [ x ]      NO                                   PHYSICAL EXAM        Neurology: alert and oriented x 3, nonfocal, no gross deficits  CV : s1 s2 RRR  Sternal Wound :  CDI , Stable  Lungs: cta  Abdomen: soft, nontender, nondistended, positive bowel sounds, last bowel movement                        :    voiding/hd      Extremities:     - edema   /  -   calve tenderness ,    L leg    incisions cdi healed          acetaminophen    Suspension .. 650 milliGRAM(s) Oral every 6 hours PRN  aspirin  chewable 81 milliGRAM(s) Oral daily  atorvastatin 80 milliGRAM(s) Oral at bedtime  benzonatate 100 milliGRAM(s) Oral every 8 hours  caspofungin IVPB 50 milliGRAM(s) IV Intermittent every 24 hours  chlorhexidine 2% Cloths 1 Application(s) Topical <User Schedule>  chlorhexidine 4% Liquid 1 Application(s) Topical <User Schedule>  dextrose 50% Injectable 25 Gram(s) IV Push once  dextrose 50% Injectable 12.5 Gram(s) IV Push once  dextrose 50% Injectable 25 Gram(s) IV Push once  dextrose Oral Gel 15 Gram(s) Oral once  epoetin teena-epbx (RETACRIT) Injectable 5000 Unit(s) IV Push <User Schedule>  glucagon  Injectable 1 milliGRAM(s) IntraMuscular once  guaiFENesin Oral Liquid (Sugar-Free) 200 milliGRAM(s) Oral every 6 hours PRN  hemorrhoidal Ointment 1 Application(s) Rectal two times a day  heparin   Injectable 5000 Unit(s) SubCutaneous every 8 hours  hydrocodone/homatropine Syrup 5 milliLiter(s) Oral every 8 hours PRN  hydrocortisone hemorrhoidal Suppository 1 Suppository(s) Rectal daily  insulin glargine Injectable (LANTUS) 18 Unit(s) SubCutaneous at bedtime  insulin lispro (ADMELOG) corrective regimen sliding scale   SubCutaneous three times a day before meals  insulin lispro (ADMELOG) corrective regimen sliding scale   SubCutaneous at bedtime  insulin lispro Injectable (ADMELOG) 6 Unit(s) SubCutaneous three times a day before meals  metoprolol tartrate 25 milliGRAM(s) Oral two times a day  midodrine 20 milliGRAM(s) Oral every 8 hours  multivitamin/minerals/iron Oral Solution (CENTRUM) 15 milliLiter(s) Oral daily  pantoprazole    Tablet 40 milliGRAM(s) Oral before breakfast  polyethylene glycol 3350 17 Gram(s) Oral daily  senna 2 Tablet(s) Oral at bedtime  sevelamer carbonate Powder 1600 milliGRAM(s) Oral three times a day with meals  simethicone 80 milliGRAM(s) Chew daily PRN  sodium chloride 0.9% lock flush 10 milliLiter(s) IV Push every 1 hour PRN  thiamine 100 milliGRAM(s) Enteral Tube daily  witch hazel Pads 1 Application(s) Topical two times a day                    Discussed with Cardiothoracic Team at AM rounds.

## 2023-01-18 NOTE — CONSULT NOTE ADULT - ATTENDING COMMENTS
Consulted for hematochezia  Constipation w/ straining BMs and painful BMs  Posterior midline fissure    - recommend bowel regimen w/ daily fiber and daily stool softener (e.g. senna) w/ prn laxatives (e.g. miralax)   - TID sitz bath w/ warm/hot water  - hydrocortisone 2.5% topical TID for 7days, lidocaine 5% topical as often as desired for pain  - If pain and bleeding don't improve w/ the above then can consider adding a topical CCB (e.g. nifedipine or diltiazem)  - Would make sure he is up to date on his screening colonoscopies  - Feel free to call us back with any questions or concerns    Raissa Rogers MD  Attending Physician

## 2023-01-18 NOTE — PROGRESS NOTE ADULT - PROBLEM SELECTOR PLAN 1
- ischemic with most recent TTE 1/9 showing EF 25%  - wean Midodrine 15 mg PO TID, if patient is able to tolerate continue to wean by 5 mg QD  - currently on Lopressor 25 mg PO BID

## 2023-01-18 NOTE — CONSULT NOTE ADULT - CONSULT REASON
ERIC
STEMI evaluation
Tunneled HD Catheter placement
sacral/bilateral buttocks skin damage
Acute change in mentation
R/o ACS
Rehabilitation consult
medical comanagement
BRBPR
Soft palate laceration
fever
Cardiac Arrest
AVF Planning
severe MR
gallstone pancreatitis
Fungemia
Intracranial bleeding
High Blood Sugars/DMT2
cardiac arrest , cardiogenic shock requiring VA ECMO
VA ECMO

## 2023-01-18 NOTE — PROGRESS NOTE ADULT - PROBLEM SELECTOR PLAN 3
- likely arrest associated ATN, hypovolemia; nonoliguric   - currently tolerating iHD, nephrology following   - s/p permacath with IR  - will require AV fistula to be completed prior to discharge  - Vascular following, will require ID clearance

## 2023-01-18 NOTE — PROGRESS NOTE ADULT - PROBLEM SELECTOR PLAN 1
Will continue current insulin regimen for now. Will continue monitoring  blood sugars, will Follow up.  Patient counseled for compliance with consistent low carb diet.  Suggest to DC home on Lantus 18u qhs, Tradjenta 5mg PO daily. Discussed with patient/family.  . Will increase Lantus 20 units qHS  Will increase Pre-meal Admelog to 8 with meals.     Will continue monitoring  blood sugars, will Follow up.  Patient counseled for compliance with consistent low carb diet.  Suggest to DC home on Lantus 20u qhs, Tradjenta 5mg PO daily. Discussed with patient/family.  .

## 2023-01-18 NOTE — PROGRESS NOTE ADULT - SUBJECTIVE AND OBJECTIVE BOX
[facility] DIVISION OF KIDNEY DISEASES AND HYPERTENSION -- PROGRESS NOTE    Chief complaint: ERIC    24 hour events/subjective: no acute events noted        PAST HISTORY  --------------------------------------------------------------------------------  No significant changes to PMH, PSH, FHx, SHx, unless otherwise noted    ALLERGIES & MEDICATIONS  --------------------------------------------------------------------------------  Allergies    No Known Allergies    Intolerances      Standing Inpatient Medications  aspirin  chewable 81 milliGRAM(s) Oral daily  atorvastatin 80 milliGRAM(s) Oral at bedtime  benzonatate 100 milliGRAM(s) Oral every 8 hours  caspofungin IVPB 50 milliGRAM(s) IV Intermittent every 24 hours  chlorhexidine 2% Cloths 1 Application(s) Topical <User Schedule>  chlorhexidine 4% Liquid 1 Application(s) Topical <User Schedule>  dextrose 50% Injectable 25 Gram(s) IV Push once  dextrose 50% Injectable 12.5 Gram(s) IV Push once  dextrose 50% Injectable 25 Gram(s) IV Push once  dextrose Oral Gel 15 Gram(s) Oral once  epoetin teena-epbx (RETACRIT) Injectable 5000 Unit(s) IV Push <User Schedule>  glucagon  Injectable 1 milliGRAM(s) IntraMuscular once  hemorrhoidal Ointment 1 Application(s) Rectal two times a day  heparin   Injectable 5000 Unit(s) SubCutaneous every 8 hours  hydrocortisone hemorrhoidal Suppository 1 Suppository(s) Rectal daily  insulin glargine Injectable (LANTUS) 18 Unit(s) SubCutaneous at bedtime  insulin lispro (ADMELOG) corrective regimen sliding scale   SubCutaneous three times a day before meals  insulin lispro (ADMELOG) corrective regimen sliding scale   SubCutaneous at bedtime  insulin lispro Injectable (ADMELOG) 6 Unit(s) SubCutaneous three times a day before meals  metoprolol tartrate 25 milliGRAM(s) Oral two times a day  midodrine 20 milliGRAM(s) Oral every 8 hours  multivitamin/minerals/iron Oral Solution (CENTRUM) 15 milliLiter(s) Oral daily  pantoprazole    Tablet 40 milliGRAM(s) Oral before breakfast  polyethylene glycol 3350 17 Gram(s) Oral daily  senna 2 Tablet(s) Oral at bedtime  sevelamer carbonate Powder 1600 milliGRAM(s) Oral three times a day with meals  thiamine 100 milliGRAM(s) Enteral Tube daily  witch hazel Pads 1 Application(s) Topical two times a day    PRN Inpatient Medications  acetaminophen    Suspension .. 650 milliGRAM(s) Oral every 6 hours PRN  guaiFENesin Oral Liquid (Sugar-Free) 200 milliGRAM(s) Oral every 6 hours PRN  hydrocodone/homatropine Syrup 5 milliLiter(s) Oral every 8 hours PRN  simethicone 80 milliGRAM(s) Chew daily PRN  sodium chloride 0.9% lock flush 10 milliLiter(s) IV Push every 1 hour PRN      REVIEW OF SYSTEMS: reports improved UO  --------------------------------------------------------------------------------  Constitutional: [ ] Fever [ ] Chills [ ] Fatigue [ ] Weight change   HEENT: [ ] Blurred vision [ ] Eye Pain [ ] Headache [ ] Runny nose [ ] Sore Throat   Respiratory: [ ] Cough [ ] Wheezing [ ] Shortness of breath  Cardiovascular: [ ] Chest Pain [ ] Palpitations [ ] BRUNSON [ ] PND [ ] Orthopnea  Gastrointestinal: [ ] Abdominal Pain [ ] Diarrhea [ ] Constipation [ ] Hemorrhoids [ ] Nausea [ ] Vomiting  Genitourinary: [ ] Nocturia [ ] Dysuria [ ] Incontinence  Extremities: [ ] Swelling [ ] Joint Pain  Neurologic: [ ] Focal deficit [ ] Paresthesias [ ] Syncope  Lymphatic: [ ] Swelling [ ] Lymphadenopathy   Skin: [ ] Rash [ ] Ecchymoses [ ] Wounds [ ] Lesions  Psychiatry: [ ] Depression [ ] Suicidal/Homicidal Ideation [ ] Anxiety [ ] Sleep Disturbances  [ x] 10 point review of systems is otherwise negative except as mentioned above              [ ]Unable to obtain due to   All other systems were reviewed and are negative, except as noted.    VITALS/PHYSICAL EXAM  --------------------------------------------------------------------------------  T(C): 36.5 (01-18-23 @ 10:55), Max: 36.9 (01-17-23 @ 23:00)  HR: 75 (01-18-23 @ 10:55) (73 - 78)  BP: 101/55 (01-18-23 @ 10:55) (96/53 - 112/61)  RR: 18 (01-18-23 @ 10:55) (18 - 19)  SpO2: 100% (01-18-23 @ 10:55) (98% - 100%)  Wt(kg): --        01-17-23 @ 07:01  -  01-18-23 @ 07:00  --------------------------------------------------------  IN: 990 mL / OUT: 400 mL / NET: 590 mL    01-18-23 @ 07:01  -  01-18-23 @ 11:52  --------------------------------------------------------  IN: 370 mL / OUT: 0 mL / NET: 370 mL      Physical Exam:  	Gen: NAD, well-appearing  	HEENT: on room air  	Pulm: CTA B/L  	CV: normal S1S2; no rub  	Abd: soft                      Back : No sacral edema  	: No michel  	LE: no edema  	Skin: Warm, without rashes  	Vascular access: RIJ tunneled dialysis catheter in situ    LABS/STUDIES  --------------------------------------------------------------------------------              10.2   12.84 >-----------<  225      [01-18-23 @ 06:36]              33.8     133  |  93  |  80  ----------------------------<  131      [01-18-23 @ 06:36]  4.1   |  25  |  4.60        Ca     9.2     [01-18-23 @ 06:36]      Mg     3.2     [01-18-23 @ 06:36]      Phos  4.0     [01-18-23 @ 06:36]    TPro  7.8  /  Alb  3.6  /  TBili  0.4  /  DBili  x   /  AST  18  /  ALT  12  /  AlkPhos  79  [01-18-23 @ 06:36]          Creatinine Trend:  SCr 4.60 [01-18 @ 06:36]  SCr 4.31 [01-17 @ 05:53]  SCr 5.90 [01-16 @ 07:08]  SCr 5.05 [01-15 @ 06:13]  SCr 3.72 [01-14 @ 07:36]    Urinalysis - [12-26-22 @ 13:19]      Color Yellow / Appearance Clear / SG 1.013 / pH 6.0      Gluc Negative / Ketone Negative  / Bili Negative / Urobili Negative       Blood Large / Protein 100 mg/dL / Leuk Est Moderate / Nitrite Negative      RBC >50 / WBC 35 / Hyaline 1 / Gran  / Sq Epi  / Non Sq Epi 2 / Bacteria Few      Iron 39, TIBC 222, %sat 18      [12-31-22 @ 06:41]  Ferritin 346      [12-31-22 @ 06:42]  TSH 2.42      [01-15-23 @ 06:13]  Lipid: chol --, , HDL --, LDL --      [12-05-22 @ 00:50]    HBsAb 889.7      [12-20-22 @ 11:25]  HBsAg Nonreact      [01-04-23 @ 10:02]  HBcAb Reactive      [12-20-22 @ 11:25]  HCV 0.15, Nonreact      [12-20-22 @ 11:25]     [facility] DIVISION OF KIDNEY DISEASES AND HYPERTENSION -- PROGRESS NOTE    Chief complaint: ERIC    24 hour events/subjective: no acute events noted        PAST HISTORY  --------------------------------------------------------------------------------  No significant changes to PMH, PSH, FHx, SHx, unless otherwise noted    ALLERGIES & MEDICATIONS  --------------------------------------------------------------------------------  Allergies    No Known Allergies    Intolerances      Standing Inpatient Medications  aspirin  chewable 81 milliGRAM(s) Oral daily  atorvastatin 80 milliGRAM(s) Oral at bedtime  benzonatate 100 milliGRAM(s) Oral every 8 hours  caspofungin IVPB 50 milliGRAM(s) IV Intermittent every 24 hours  chlorhexidine 2% Cloths 1 Application(s) Topical <User Schedule>  chlorhexidine 4% Liquid 1 Application(s) Topical <User Schedule>  dextrose 50% Injectable 25 Gram(s) IV Push once  dextrose 50% Injectable 12.5 Gram(s) IV Push once  dextrose 50% Injectable 25 Gram(s) IV Push once  dextrose Oral Gel 15 Gram(s) Oral once  epoetin teena-epbx (RETACRIT) Injectable 5000 Unit(s) IV Push <User Schedule>  glucagon  Injectable 1 milliGRAM(s) IntraMuscular once  hemorrhoidal Ointment 1 Application(s) Rectal two times a day  heparin   Injectable 5000 Unit(s) SubCutaneous every 8 hours  hydrocortisone hemorrhoidal Suppository 1 Suppository(s) Rectal daily  insulin glargine Injectable (LANTUS) 18 Unit(s) SubCutaneous at bedtime  insulin lispro (ADMELOG) corrective regimen sliding scale   SubCutaneous three times a day before meals  insulin lispro (ADMELOG) corrective regimen sliding scale   SubCutaneous at bedtime  insulin lispro Injectable (ADMELOG) 6 Unit(s) SubCutaneous three times a day before meals  metoprolol tartrate 25 milliGRAM(s) Oral two times a day  midodrine 20 milliGRAM(s) Oral every 8 hours  multivitamin/minerals/iron Oral Solution (CENTRUM) 15 milliLiter(s) Oral daily  pantoprazole    Tablet 40 milliGRAM(s) Oral before breakfast  polyethylene glycol 3350 17 Gram(s) Oral daily  senna 2 Tablet(s) Oral at bedtime  sevelamer carbonate Powder 1600 milliGRAM(s) Oral three times a day with meals  thiamine 100 milliGRAM(s) Enteral Tube daily  witch hazel Pads 1 Application(s) Topical two times a day    PRN Inpatient Medications  acetaminophen    Suspension .. 650 milliGRAM(s) Oral every 6 hours PRN  guaiFENesin Oral Liquid (Sugar-Free) 200 milliGRAM(s) Oral every 6 hours PRN  hydrocodone/homatropine Syrup 5 milliLiter(s) Oral every 8 hours PRN  simethicone 80 milliGRAM(s) Chew daily PRN  sodium chloride 0.9% lock flush 10 milliLiter(s) IV Push every 1 hour PRN      REVIEW OF SYSTEMS: reports improved UO  --------------------------------------------------------------------------------  Constitutional: [ ] Fever [ ] Chills [ ] Fatigue [ ] Weight change   HEENT: [ ] Blurred vision [ ] Eye Pain [ ] Headache [ ] Runny nose [ ] Sore Throat   Respiratory: [ ] Cough [ ] Wheezing [ ] Shortness of breath  Cardiovascular: [ ] Chest Pain [ ] Palpitations [ ] BRUNSON [ ] PND [ ] Orthopnea  Gastrointestinal: [ ] Abdominal Pain [ ] Diarrhea [ ] Constipation [ ] Hemorrhoids [ ] Nausea [ ] Vomiting  Genitourinary: [ ] Nocturia [ ] Dysuria [ ] Incontinence  Extremities: [ ] Swelling [ ] Joint Pain  Neurologic: [ ] Focal deficit [ ] Paresthesias [ ] Syncope  Lymphatic: [ ] Swelling [ ] Lymphadenopathy   Skin: [ ] Rash [ ] Ecchymoses [ ] Wounds [ ] Lesions  Psychiatry: [ ] Depression [ ] Suicidal/Homicidal Ideation [ ] Anxiety [ ] Sleep Disturbances  [ x] 10 point review of systems is otherwise negative except as mentioned above              [ ]Unable to obtain due to   All other systems were reviewed and are negative, except as noted.    VITALS/PHYSICAL EXAM  --------------------------------------------------------------------------------  T(C): 36.5 (01-18-23 @ 10:55), Max: 36.9 (01-17-23 @ 23:00)  HR: 75 (01-18-23 @ 10:55) (73 - 78)  BP: 101/55 (01-18-23 @ 10:55) (96/53 - 112/61)  RR: 18 (01-18-23 @ 10:55) (18 - 19)  SpO2: 100% (01-18-23 @ 10:55) (98% - 100%)  Wt(kg): --        01-17-23 @ 07:01  -  01-18-23 @ 07:00  --------------------------------------------------------  IN: 990 mL / OUT: 400 mL / NET: 590 mL    01-18-23 @ 07:01  -  01-18-23 @ 11:52  --------------------------------------------------------  IN: 370 mL / OUT: 0 mL / NET: 370 mL      Physical Exam:  	Gen: NAD, well-appearing  	HEENT: on room air  	Pulm: CTA B/L  	CV: normal S1S2; no rub  	Abd: soft                      Back : No sacral edema  	: No michel  	LE: no edema  	Skin: Warm, without rashes  	Vascular access: RIJ tunneled dialysis catheter in situ    LABS/STUDIES  --------------------------------------------------------------------------------              10.2   12.84 >-----------<  225      [01-18-23 @ 06:36]              33.8     133  |  93  |  80  ----------------------------<  131      [01-18-23 @ 06:36]  4.1   |  25  |  4.60        Ca     9.2     [01-18-23 @ 06:36]      Mg     3.2     [01-18-23 @ 06:36]      Phos  4.0     [01-18-23 @ 06:36]    TPro  7.8  /  Alb  3.6  /  TBili  0.4  /  DBili  x   /  AST  18  /  ALT  12  /  AlkPhos  79  [01-18-23 @ 06:36]          Creatinine Trend:  SCr 4.60 [01-18 @ 06:36]  SCr 4.31 [01-17 @ 05:53]  SCr 5.90 [01-16 @ 07:08]  SCr 5.05 [01-15 @ 06:13]  SCr 3.72 [01-14 @ 07:36]    Urinalysis - [12-26-22 @ 13:19]      Color Yellow / Appearance Clear / SG 1.013 / pH 6.0      Gluc Negative / Ketone Negative  / Bili Negative / Urobili Negative       Blood Large / Protein 100 mg/dL / Leuk Est Moderate / Nitrite Negative      RBC >50 / WBC 35 / Hyaline 1 / Gran  / Sq Epi  / Non Sq Epi 2 / Bacteria Few      Iron 39, TIBC 222, %sat 18      [12-31-22 @ 06:41]  Ferritin 346      [12-31-22 @ 06:42]  TSH 2.42      [01-15-23 @ 06:13]  Lipid: chol --, , HDL --, LDL --      [12-05-22 @ 00:50]    HBsAb 889.7      [12-20-22 @ 11:25]  HBsAg Nonreact      [01-04-23 @ 10:02]  HBcAb Reactive      [12-20-22 @ 11:25]  HCV 0.15, Nonreact      [12-20-22 @ 11:25]     [facility] DIVISION OF KIDNEY DISEASES AND HYPERTENSION -- PROGRESS NOTE    Chief complaint: ERIC    24 hour events/subjective: no acute events noted        PAST HISTORY  --------------------------------------------------------------------------------  No significant changes to PMH, PSH, FHx, SHx, unless otherwise noted    ALLERGIES & MEDICATIONS  --------------------------------------------------------------------------------  Allergies    No Known Allergies    Intolerances      Standing Inpatient Medications  aspirin  chewable 81 milliGRAM(s) Oral daily  atorvastatin 80 milliGRAM(s) Oral at bedtime  benzonatate 100 milliGRAM(s) Oral every 8 hours  caspofungin IVPB 50 milliGRAM(s) IV Intermittent every 24 hours  chlorhexidine 2% Cloths 1 Application(s) Topical <User Schedule>  chlorhexidine 4% Liquid 1 Application(s) Topical <User Schedule>  dextrose 50% Injectable 25 Gram(s) IV Push once  dextrose 50% Injectable 12.5 Gram(s) IV Push once  dextrose 50% Injectable 25 Gram(s) IV Push once  dextrose Oral Gel 15 Gram(s) Oral once  epoetin teena-epbx (RETACRIT) Injectable 5000 Unit(s) IV Push <User Schedule>  glucagon  Injectable 1 milliGRAM(s) IntraMuscular once  hemorrhoidal Ointment 1 Application(s) Rectal two times a day  heparin   Injectable 5000 Unit(s) SubCutaneous every 8 hours  hydrocortisone hemorrhoidal Suppository 1 Suppository(s) Rectal daily  insulin glargine Injectable (LANTUS) 18 Unit(s) SubCutaneous at bedtime  insulin lispro (ADMELOG) corrective regimen sliding scale   SubCutaneous three times a day before meals  insulin lispro (ADMELOG) corrective regimen sliding scale   SubCutaneous at bedtime  insulin lispro Injectable (ADMELOG) 6 Unit(s) SubCutaneous three times a day before meals  metoprolol tartrate 25 milliGRAM(s) Oral two times a day  midodrine 20 milliGRAM(s) Oral every 8 hours  multivitamin/minerals/iron Oral Solution (CENTRUM) 15 milliLiter(s) Oral daily  pantoprazole    Tablet 40 milliGRAM(s) Oral before breakfast  polyethylene glycol 3350 17 Gram(s) Oral daily  senna 2 Tablet(s) Oral at bedtime  sevelamer carbonate Powder 1600 milliGRAM(s) Oral three times a day with meals  thiamine 100 milliGRAM(s) Enteral Tube daily  witch hazel Pads 1 Application(s) Topical two times a day    PRN Inpatient Medications  acetaminophen    Suspension .. 650 milliGRAM(s) Oral every 6 hours PRN  guaiFENesin Oral Liquid (Sugar-Free) 200 milliGRAM(s) Oral every 6 hours PRN  hydrocodone/homatropine Syrup 5 milliLiter(s) Oral every 8 hours PRN  simethicone 80 milliGRAM(s) Chew daily PRN  sodium chloride 0.9% lock flush 10 milliLiter(s) IV Push every 1 hour PRN      REVIEW OF SYSTEMS: reports improved UO  --------------------------------------------------------------------------------  Constitutional: [ ] Fever [ ] Chills [ ] Fatigue [ ] Weight change   HEENT: [ ] Blurred vision [ ] Eye Pain [ ] Headache [ ] Runny nose [ ] Sore Throat   Respiratory: [ ] Cough [ ] Wheezing [ ] Shortness of breath  Cardiovascular: [ ] Chest Pain [ ] Palpitations [ ] BRUNSON [ ] PND [ ] Orthopnea  Gastrointestinal: [ ] Abdominal Pain [ ] Diarrhea [ ] Constipation [ ] Hemorrhoids [ ] Nausea [ ] Vomiting  Genitourinary: [ ] Nocturia [ ] Dysuria [ ] Incontinence  Extremities: [ ] Swelling [ ] Joint Pain  Neurologic: [ ] Focal deficit [ ] Paresthesias [ ] Syncope  Lymphatic: [ ] Swelling [ ] Lymphadenopathy   Skin: [ ] Rash [ ] Ecchymoses [ ] Wounds [ ] Lesions  Psychiatry: [ ] Depression [ ] Suicidal/Homicidal Ideation [ ] Anxiety [ ] Sleep Disturbances  [ x] 10 point review of systems is otherwise negative except as mentioned above              [ ]Unable to obtain due to   All other systems were reviewed and are negative, except as noted.    VITALS/PHYSICAL EXAM  --------------------------------------------------------------------------------  T(C): 36.5 (01-18-23 @ 10:55), Max: 36.9 (01-17-23 @ 23:00)  HR: 75 (01-18-23 @ 10:55) (73 - 78)  BP: 101/55 (01-18-23 @ 10:55) (96/53 - 112/61)  RR: 18 (01-18-23 @ 10:55) (18 - 19)  SpO2: 100% (01-18-23 @ 10:55) (98% - 100%)  Wt(kg): --        01-17-23 @ 07:01  -  01-18-23 @ 07:00  --------------------------------------------------------  IN: 990 mL / OUT: 400 mL / NET: 590 mL    01-18-23 @ 07:01  -  01-18-23 @ 11:52  --------------------------------------------------------  IN: 370 mL / OUT: 0 mL / NET: 370 mL      Physical Exam:  	Gen: NAD, well-appearing  	HEENT: on room air  	Pulm: CTA B/L  	CV: normal S1S2; no rub  	Abd: soft                      Back : No sacral edema  	: No michel  	LE: no edema  	Skin: Warm, without rashes  	Vascular access: RIJ tunneled dialysis catheter in situ    LABS/STUDIES  --------------------------------------------------------------------------------              10.2   12.84 >-----------<  225      [01-18-23 @ 06:36]              33.8     133  |  93  |  80  ----------------------------<  131      [01-18-23 @ 06:36]  4.1   |  25  |  4.60        Ca     9.2     [01-18-23 @ 06:36]      Mg     3.2     [01-18-23 @ 06:36]      Phos  4.0     [01-18-23 @ 06:36]    TPro  7.8  /  Alb  3.6  /  TBili  0.4  /  DBili  x   /  AST  18  /  ALT  12  /  AlkPhos  79  [01-18-23 @ 06:36]          Creatinine Trend:  SCr 4.60 [01-18 @ 06:36]  SCr 4.31 [01-17 @ 05:53]  SCr 5.90 [01-16 @ 07:08]  SCr 5.05 [01-15 @ 06:13]  SCr 3.72 [01-14 @ 07:36]    Urinalysis - [12-26-22 @ 13:19]      Color Yellow / Appearance Clear / SG 1.013 / pH 6.0      Gluc Negative / Ketone Negative  / Bili Negative / Urobili Negative       Blood Large / Protein 100 mg/dL / Leuk Est Moderate / Nitrite Negative      RBC >50 / WBC 35 / Hyaline 1 / Gran  / Sq Epi  / Non Sq Epi 2 / Bacteria Few      Iron 39, TIBC 222, %sat 18      [12-31-22 @ 06:41]  Ferritin 346      [12-31-22 @ 06:42]  TSH 2.42      [01-15-23 @ 06:13]  Lipid: chol --, , HDL --, LDL --      [12-05-22 @ 00:50]    HBsAb 889.7      [12-20-22 @ 11:25]  HBsAg Nonreact      [01-04-23 @ 10:02]  HBcAb Reactive      [12-20-22 @ 11:25]  HCV 0.15, Nonreact      [12-20-22 @ 11:25]

## 2023-01-18 NOTE — PROGRESS NOTE ADULT - ASSESSMENT
61 YO M with a history of CAD s/p 4v CABG ~2019 in [facility], DM2 (A1c 7.3%), and HTN who initially presented to OSH with abdominal pain and n/v and found to have pancreatitis with lipase > 3000 though also with elevated troponins. CT abdomen revealed acute pancreatitis and his initial LVEF was 40-45%. He developed MSOF with hypoxic respiratory failure requiring intubation and ERIC and went into hypoxic PEA arrest requiring ACLS and due to persistent hypoxia and hypotension on pressors after ROSC was cannulated to [facility] ECMO (LFV, RFA, no anterograde perfusion catheter) on 11/25. Notably CTH that day revealed small subdural hemorrhage.     His post arrest TTE on 11/26 showed a significantly worse LVEF of 5-10% with an AV that was only minimally opening. Since then he has had improvement in his LV function to ~35-40% and improved pulsatility while tolerating progressive slow ECMO weans. ECMO turndown (note in chart 11/30) was reassuring for ability to decannulate to IABP/inotropes. LHC 12/06 showed closure of his 3 vein grafts with graft to LAD patent though with distal native disease. No coronary intervention was done. IABP placed, ECMO successfully decannulated 12/08 (transfused 2u pRBCs during).     His ARDS has improved and he's now s/p trach (decannulated 1/8). He's now off IABP, removed 12/17, and inotropes, however has previously been unable to tolerate GDMT due to soft BPs and renal failure requiring dialysis. 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 He is overall progressing well and tolerating iHD. Will continue to wean Midodrine as tolerated and introduce GDMT as his pressure allows.         Previous cardiac studies:  TTE 1/9/23 - EF 25%, mild MR, min AI, no TR, no obvious valvular vegetations.  TTE 12/19/22 - EF 24%, LVIDd 5.6, normal RV function, estimated PASP 45mmhg  LHC (12/06/22) - patent LIMA-LAD, RCA , LCx , aortogram w/ closure of 3 vein grafts, LVEDP 22 mm Hg, left-to-left and left-to-right collaterals  TTE (12/03/22) - mod-to-sev segmental LVSD (inferolateral, inferior, inferoseptal hypokinesis) 61 YO M with a history of CAD s/p 4v CABG ~2019 in [facility], DM2 (A1c 7.3%), and HTN who initially presented to OSH with abdominal pain and n/v and found to have pancreatitis with lipase > 3000 though also with elevated troponins. CT abdomen revealed acute pancreatitis and his initial LVEF was 40-45%. He developed MSOF with hypoxic respiratory failure requiring intubation and ERIC and went into hypoxic PEA arrest requiring ACLS and due to persistent hypoxia and hypotension on pressors after ROSC was cannulated to VA ECMO (LFV, RFA, no anterograde perfusion catheter) on 11/25. Notably CTH that day revealed small subdural hemorrhage.     His post arrest TTE on 11/26 showed a significantly worse LVEF of 5-10% with an AV that was only minimally opening. Since then he has had improvement in his LV function to ~35-40% and improved pulsatility while tolerating progressive slow ECMO weans. ECMO turndown (note in chart 11/30) was reassuring for ability to decannulate to IABP/inotropes. LHC 12/06 showed closure of his 3 vein grafts with graft to LAD patent though with distal native disease. No coronary intervention was done. IABP placed, ECMO successfully decannulated 12/08 (transfused 2u pRBCs during).     His ARDS has improved and he's now s/p trach (decannulated 1/8). He's now off IABP, removed 12/17, and inotropes, however has previously been unable to tolerate GDMT due to soft BPs and renal failure requiring dialysis. 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 He is overall progressing well and tolerating iHD. Will continue to wean Midodrine as tolerated and introduce GDMT as his pressure allows.         Previous cardiac studies:  TTE 1/9/23 - EF 25%, mild MR, min AI, no TR, no obvious valvular vegetations.  TTE 12/19/22 - EF 24%, LVIDd 5.6, normal RV function, estimated PASP 45mmhg  LHC (12/06/22) - patent LIMA-LAD, RCA , LCx , aortogram w/ closure of 3 vein grafts, LVEDP 22 mm Hg, left-to-left and left-to-right collaterals  TTE (12/03/22) - mod-to-sev segmental LVSD (inferolateral, inferior, inferoseptal hypokinesis) 63 YO M with a history of CAD s/p 4v CABG ~2019 in [facility], DM2 (A1c 7.3%), and HTN who initially presented to OSH with abdominal pain and n/v and found to have pancreatitis with lipase > 3000 though also with elevated troponins. CT abdomen revealed acute pancreatitis and his initial LVEF was 40-45%. He developed MSOF with hypoxic respiratory failure requiring intubation and ERIC and went into hypoxic PEA arrest requiring ACLS and due to persistent hypoxia and hypotension on pressors after ROSC was cannulated to VA ECMO (LFV, RFA, no anterograde perfusion catheter) on 11/25. Notably CTH that day revealed small subdural hemorrhage.     His post arrest TTE on 11/26 showed a significantly worse LVEF of 5-10% with an AV that was only minimally opening. Since then he has had improvement in his LV function to ~35-40% and improved pulsatility while tolerating progressive slow ECMO weans. ECMO turndown (note in chart 11/30) was reassuring for ability to decannulate to IABP/inotropes. LHC 12/06 showed closure of his 3 vein grafts with graft to LAD patent though with distal native disease. No coronary intervention was done. IABP placed, ECMO successfully decannulated 12/08 (transfused 2u pRBCs during).     His ARDS has improved and he's now s/p trach (decannulated 1/8). He's now off IABP, removed 12/17, and inotropes, however has previously been unable to tolerate GDMT due to soft BPs and renal failure requiring dialysis. 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 He is overall progressing well and tolerating iHD. Will continue to wean Midodrine as tolerated and introduce GDMT as his pressure allows.         Previous cardiac studies:  TTE 1/9/23 - EF 25%, mild MR, min AI, no TR, no obvious valvular vegetations.  TTE 12/19/22 - EF 24%, LVIDd 5.6, normal RV function, estimated PASP 45mmhg  LHC (12/06/22) - patent LIMA-LAD, RCA , LCx , aortogram w/ closure of 3 vein grafts, LVEDP 22 mm Hg, left-to-left and left-to-right collaterals  TTE (12/03/22) - mod-to-sev segmental LVSD (inferolateral, inferior, inferoseptal hypokinesis)

## 2023-01-18 NOTE — CONSULT NOTE ADULT - PROVIDER SPECIALTY LIST ADULT
Neurology
Wound Care
ENT
Infectious Disease
Ophthalmology
Cardiology
Colorectal Surgery
Neurosurgery
Palliative Care
Structural Heart
4 = No assist / stand by assistance
SICU
Gastroenterology
Nephrology
Rehab Medicine
SAVANNAH
Vascular Surgery
Intervent Radiology
Endocrinology
Heart Failure
Hospitalist

## 2023-01-18 NOTE — CONSULT NOTE ADULT - CONSULT REQUESTED DATE/TIME
08-Dec-2022
30-Dec-2022 11:20
30-Dec-2022 11:36
24-Nov-2022 08:16
24-Nov-2022 20:33
10-Teo-2023 15:34
25-Nov-2022 23:32
26-Nov-2022 16:32
28-Nov-2022 08:53
21-Dec-2022 18:53
12-Dec-2022 02:44
05-Jan-2023 18:32
29-Nov-2022 22:22
15-Dec-2022 14:39
18-Jan-2023 21:18
25-Nov-2022 16:02
14-Jan-2023 17:34
28-Nov-2022 00:00
28-Nov-2022 14:25
25-Nov-2022

## 2023-01-18 NOTE — PROGRESS NOTE ADULT - ASSESSMENT
61 YO M with a history of CAD s/p 4v CABG ~2019 in [facility], DM2 (A1c 7.3%), and HTN who initially presented to OSH with abdominal pain and n/v and found to have pancreatitis with lipase > 3000 though also with elevated troponins. CT abdomen revealed acute pancreatitis and his initial LVEF was 40-45%. He developed MSOF with hypoxic respiratory failure requiring intubation and ERIC and went into hypoxic PEA arrest requiring ACLS and due to persistent hypoxia and hypotension on pressors after ROSC was cannulated to VA ECMO (LFV, RFA, no anterograde perfusion catheter) on 11/25. Notably CTH that day revealed small subdural hemorrhage.     His post arrest TTE on 11/26 showed a significantly worse LVEF of 5-10% with an AV that was only minimally opening. Since then he has had improvement in his LV function to ~35-40% and improved pulsatility while tolerating progressive slow ECMO weans. ECMO turndown (note in chart 11/30) was reassuring for ability to decannulate to IABP/inotropes. LHC 12/06 showed closure of his 3 vein grafts with graft to LAD patent though with distal native disease. No coronary intervention was done. IABP placed, ECMO successfully decannulated 12/08 (transfused 2u pRBCs during).     His ARDS has improved and he's now s/p trach (decannulated 1/8). He's now off IABP, removed 12/17, and inotropes, however has previously been unable to tolerate GDMT due to soft BPs and renal failure requiring dialysis. 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 He's afebrile with improving leukocytosis. He has urine output but not enough to stay euvolemic. He's tolerating intermittent HD and remains on midodrine for BP support.     [facility]/f [facility], abd US +GB stones, pericholecystic fluid  11/25 VA ECMO s/p hypoxic respiratory arrest/PEA arrest, CTH 4mm subdural, ERIC  12/5 CVVHD, RUQ US neg   12/6 IABP placed in cath lab, LIMA to LAD patent, RCA and LCx down, CTH subdural decreased in size, persistent pancreatic inflammation   12/7 TTE turndown, EF 30-40%, nml RV, MR mod, mod TR  12/8 ECMO decannulation, aflutter DCCV x 2  12/12 Mental status change- CTH no change, CT perfusion/CTA H/N neg for LVO, +low grade watershed infarct to L MCA  12/16 Mitral clip x 1, TRACH 7 Shiley XLT w/ ENT  12/17 IABP dced  12/26 +clinton BCx   12/27 R groin vac  1/8 trach decannulation   1/10 passed FEEST  1/11 R permacath placed by IR  1/13 Transferred to SDU  1/14 VSS; RSR ; hd as per renal MWF; continue vac to rt groin ; nutritional optimization and PT; [facility] sx called for AV fistula placement on this admission (vein mapping completed 12/22) ; continue capsofungin as per Dr. Horton; endo consulted today for dm management- placed on lantus and admelog  1/15 VSS; RSR 70-80; HD as per renal - k 3.8 today; continue nutritional support and pt; prealbumin 21; await date of AVF from [facility] sx--> pt cleared as per cardiac surgeon Dr. Horton- pt must have AVF with cardiac anesthesia-discussed with [facility] sx fellow Rajeev Ascencio; continue vac to rt groin; bs improved on lantus/ admelog   discharge planning- acute rehab when stable   1/16      HD,   rt groin vac     labs stable  1/17 Increase ambulation.   AVF this week, will need cardiac anesthesia   May transfer to floor  Cont capsofungin  1/18 AVF this week with cardiac anesthesia/vascular, cleared by Dr horton .  Remains on capsofungin prophylactically, completed coursse  + cough, HD today , cxr done, clear       63 YO M with a history of CAD s/p 4v CABG ~2019 in [facility], DM2 (A1c 7.3%), and HTN who initially presented to OSH with abdominal pain and n/v and found to have pancreatitis with lipase > 3000 though also with elevated troponins. CT abdomen revealed acute pancreatitis and his initial LVEF was 40-45%. He developed MSOF with hypoxic respiratory failure requiring intubation and ERIC and went into hypoxic PEA arrest requiring ACLS and due to persistent hypoxia and hypotension on pressors after ROSC was cannulated to VA ECMO (LFV, RFA, no anterograde perfusion catheter) on 11/25. Notably CTH that day revealed small subdural hemorrhage.     His post arrest TTE on 11/26 showed a significantly worse LVEF of 5-10% with an AV that was only minimally opening. Since then he has had improvement in his LV function to ~35-40% and improved pulsatility while tolerating progressive slow ECMO weans. ECMO turndown (note in chart 11/30) was reassuring for ability to decannulate to IABP/inotropes. LHC 12/06 showed closure of his 3 vein grafts with graft to LAD patent though with distal native disease. No coronary intervention was done. IABP placed, ECMO successfully decannulated 12/08 (transfused 2u pRBCs during).     His ARDS has improved and he's now s/p trach (decannulated 1/8). He's now off IABP, removed 12/17, and inotropes, however has previously been unable to tolerate GDMT due to soft BPs and renal failure requiring dialysis. 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 He's afebrile with improving leukocytosis. He has urine output but not enough to stay euvolemic. He's tolerating intermittent HD and remains on midodrine for BP support.     11/24 Transfer from [facility] to SICU c/f STEMI, abd US +GB stones, pericholecystic fluid  11/25 VA ECMO s/p hypoxic respiratory arrest/PEA arrest, CTH 4mm subdural, ERIC  12/5 CVVHD, RUQ US neg   12/6 IABP placed in cath lab, LIMA to LAD patent, RCA and LCx down, CTH subdural decreased in size, persistent pancreatic inflammation   12/7 TTE turndown, EF 30-40%, nml RV, MR mod, mod TR  12/8 ECMO decannulation, aflutter DCCV x 2  12/12 Mental status change- CTH no change, CT perfusion/CTA H/N neg for LVO, +low grade watershed infarct to L MCA  12/16 Mitral clip x 1, TRACH 7 Shiley XLT w/ ENT  12/17 IABP dced  12/26 +clinton BCx   12/27 R groin vac  1/8 trach decannulation   1/10 passed FEEST  1/11 R permacath placed by IR  1/13 Transferred to SDU  1/14 VSS; RSR ; hd as per renal MWF; continue vac to rt groin ; nutritional optimization and PT; [facility] sx called for AV fistula placement on this admission (vein mapping completed 12/22) ; continue capsofungin as per Dr. Horton; endo consulted today for dm management- placed on lantus and admelog  1/15 VSS; RSR 70-80; HD as per renal - k 3.8 today; continue nutritional support and pt; prealbumin 21; await date of AVF from [facility] sx--> pt cleared as per cardiac surgeon Dr. Horton- pt must have AVF with cardiac anesthesia-discussed with [facility] sx fellow Rajeev Ascencio; continue vac to rt groin; bs improved on lantus/ admelog   discharge planning- acute rehab when stable   1/16      HD,   rt groin vac     labs stable  1/17 Increase ambulation.   AVF this week, will need cardiac anesthesia   May transfer to floor  Cont capsofungin  1/18 AVF this week with cardiac anesthesia/vascular, cleared by Dr horton .  Remains on capsofungin prophylactically, completed coursse  + cough, HD today , cxr done, clear       63 YO M with a history of CAD s/p 4v CABG ~2019 in [facility], DM2 (A1c 7.3%), and HTN who initially presented to OSH with abdominal pain and n/v and found to have pancreatitis with lipase > 3000 though also with elevated troponins. CT abdomen revealed acute pancreatitis and his initial LVEF was 40-45%. He developed MSOF with hypoxic respiratory failure requiring intubation and ERIC and went into hypoxic PEA arrest requiring ACLS and due to persistent hypoxia and hypotension on pressors after ROSC was cannulated to VA ECMO (LFV, RFA, no anterograde perfusion catheter) on 11/25. Notably CTH that day revealed small subdural hemorrhage.     His post arrest TTE on 11/26 showed a significantly worse LVEF of 5-10% with an AV that was only minimally opening. Since then he has had improvement in his LV function to ~35-40% and improved pulsatility while tolerating progressive slow ECMO weans. ECMO turndown (note in chart 11/30) was reassuring for ability to decannulate to IABP/inotropes. LHC 12/06 showed closure of his 3 vein grafts with graft to LAD patent though with distal native disease. No coronary intervention was done. IABP placed, ECMO successfully decannulated 12/08 (transfused 2u pRBCs during).     His ARDS has improved and he's now s/p trach (decannulated 1/8). He's now off IABP, removed 12/17, and inotropes, however has previously been unable to tolerate GDMT due to soft BPs and renal failure requiring dialysis. 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 He's afebrile with improving leukocytosis. He has urine output but not enough to stay euvolemic. He's tolerating intermittent HD and remains on midodrine for BP support.     11/24 Transfer from [facility] to SICU c/f STEMI, abd US +GB stones, pericholecystic fluid  11/25 VA ECMO s/p hypoxic respiratory arrest/PEA arrest, CTH 4mm subdural, ERIC  12/5 CVVHD, RUQ US neg   12/6 IABP placed in cath lab, LIMA to LAD patent, RCA and LCx down, CTH subdural decreased in size, persistent pancreatic inflammation   12/7 TTE turndown, EF 30-40%, nml RV, MR mod, mod TR  12/8 ECMO decannulation, aflutter DCCV x 2  12/12 Mental status change- CTH no change, CT perfusion/CTA H/N neg for LVO, +low grade watershed infarct to L MCA  12/16 Mitral clip x 1, TRACH 7 Shiley XLT w/ ENT  12/17 IABP dced  12/26 +clinton BCx   12/27 R groin vac  1/8 trach decannulation   1/10 passed FEEST  1/11 R permacath placed by IR  1/13 Transferred to SDU  1/14 VSS; RSR ; hd as per renal MWF; continue vac to rt groin ; nutritional optimization and PT; [facility] sx called for AV fistula placement on this admission (vein mapping completed 12/22) ; continue capsofungin as per Dr. Horton; endo consulted today for dm management- placed on lantus and admelog  1/15 VSS; RSR 70-80; HD as per renal - k 3.8 today; continue nutritional support and pt; prealbumin 21; await date of AVF from [facility] sx--> pt cleared as per cardiac surgeon Dr. Horton- pt must have AVF with cardiac anesthesia-discussed with [facility] sx fellow Rajeev Ascencio; continue vac to rt groin; bs improved on lantus/ admelog   discharge planning- acute rehab when stable   1/16      HD,   rt groin vac     labs stable  1/17 Increase ambulation.   AVF this week, will need cardiac anesthesia   May transfer to floor  Cont capsofungin  1/18 AVF this week with cardiac anesthesia/vascular, cleared by Dr horton .  Remains on capsofungin prophylactically, completed coursse  + cough, HD today , cxr done, clear

## 2023-01-18 NOTE — CONSULT NOTE ADULT - REASON FOR ADMISSION
Acute cholecystitis, gallstone pancreatitis

## 2023-01-18 NOTE — PROGRESS NOTE ADULT - NS ATTEND AMEND GEN_ALL_CORE FT
63 YO M with a history of CAD s/p 4v CABG ~2019 in [facility], DM2 (A1c 7.3%), and HTN who initially presented to OSH with abdominal pain and n/v and found to have pancreatitis with lipase > 3000 though also with elevated troponins. CT abdomen revealed acute pancreatitis and his initial LVEF was 40-45%. He developed MSOF with hypoxic respiratory failure requiring intubation and ERIC and went into hypoxic PEA arrest requiring ACLS and due to persistent hypoxia and hypotension on pressors after ROSC was cannulated to [facility] ECMO (LFV, RFA, no anterograde perfusion catheter) on 11/25. Notably CTH that day revealed small subdural hemorrhage.     His immediate post arrest TTE showed critical LV dysfunction which slowly improved. LHC revealed closure of his 3 vein grafts and patent LIMA-LAD. He underwent ECMO decannulation and due to severe MR underwent MitraClip prior to IABP removal. He underwent tracheostomy which has since been decannulated. He has had persistent renal failure and now dependent on dialysis. He also developed candida fungemia.    Despite all of this he is steadily improving clinically.      From a HF perspective he remains with severe LV dysfunction and BP poorly tolerant of GDMT. Wean midodrine from 20 mg TID to 15 mg TID and would continue to wean by 5 mg TID as tolerates so he ideally can be started on neurohormonal therapy. He may need advanced therapies evaluation in the future after further rehabilitation. 63 YO M with a history of CAD s/p 4v CABG ~2019 in [facility], DM2 (A1c 7.3%), and HTN who initially presented to OSH with abdominal pain and n/v and found to have pancreatitis with lipase > 3000 though also with elevated troponins. CT abdomen revealed acute pancreatitis and his initial LVEF was 40-45%. He developed MSOF with hypoxic respiratory failure requiring intubation and ERIC and went into hypoxic PEA arrest requiring ACLS and due to persistent hypoxia and hypotension on pressors after ROSC was cannulated to VA ECMO (LFV, RFA, no anterograde perfusion catheter) on 11/25. Notably CTH that day revealed small subdural hemorrhage.     His immediate post arrest TTE showed critical LV dysfunction which slowly improved. LHC revealed closure of his 3 vein grafts and patent LIMA-LAD. He underwent ECMO decannulation and due to severe MR underwent MitraClip prior to IABP removal. He underwent tracheostomy which has since been decannulated. He has had persistent renal failure and now dependent on dialysis. He also developed candida fungemia.    Despite all of this he is steadily improving clinically.      From a HF perspective he remains with severe LV dysfunction and BP poorly tolerant of GDMT. Wean midodrine from 20 mg TID to 15 mg TID and would continue to wean by 5 mg TID as tolerates so he ideally can be started on neurohormonal therapy. He may need advanced therapies evaluation in the future after further rehabilitation. 61 YO M with a history of CAD s/p 4v CABG ~2019 in [facility], DM2 (A1c 7.3%), and HTN who initially presented to OSH with abdominal pain and n/v and found to have pancreatitis with lipase > 3000 though also with elevated troponins. CT abdomen revealed acute pancreatitis and his initial LVEF was 40-45%. He developed MSOF with hypoxic respiratory failure requiring intubation and ERIC and went into hypoxic PEA arrest requiring ACLS and due to persistent hypoxia and hypotension on pressors after ROSC was cannulated to VA ECMO (LFV, RFA, no anterograde perfusion catheter) on 11/25. Notably CTH that day revealed small subdural hemorrhage.     His immediate post arrest TTE showed critical LV dysfunction which slowly improved. LHC revealed closure of his 3 vein grafts and patent LIMA-LAD. He underwent ECMO decannulation and due to severe MR underwent MitraClip prior to IABP removal. He underwent tracheostomy which has since been decannulated. He has had persistent renal failure and now dependent on dialysis. He also developed candida fungemia.    Despite all of this he is steadily improving clinically.      From a HF perspective he remains with severe LV dysfunction and BP poorly tolerant of GDMT. Wean midodrine from 20 mg TID to 15 mg TID and would continue to wean by 5 mg TID as tolerates so he ideally can be started on neurohormonal therapy. He may need advanced therapies evaluation in the future after further rehabilitation. 63 YO M with a history of CAD s/p 4v CABG ~2019 in [facility], DM2 (A1c 7.3%), and HTN who initially presented to OSH with abdominal pain and n/v and found to have pancreatitis with lipase > 3000 though also with elevated troponins. CT abdomen revealed acute pancreatitis and his initial LVEF was 40-45%. He developed MSOF with hypoxic respiratory failure requiring intubation and ERIC and went into hypoxic PEA arrest requiring ACLS and due to persistent hypoxia and hypotension on pressors after ROSC was cannulated to VA ECMO (LFV, RFA, no anterograde perfusion catheter) on 11/25. Notably CTH that day revealed small subdural hemorrhage.     His immediate post arrest TTE showed critical LV dysfunction which slowly improved. LHC revealed closure of his 3 vein grafts and patent LIMA-LAD. He underwent ECMO decannulation and due to severe MR underwent MitraClip prior to IABP removal. He underwent tracheostomy which has since been decannulated. He has had persistent renal failure and now dependent on dialysis. He also developed candida fungemia.    Despite all of this he is steadily improving clinically.      From a HF perspective he remains with severe LV dysfunction and BP poorly tolerant of GDMT. Wean midodrine from 20 mg TID to 15 mg TID and would continue to wean by 5 mg TID as tolerates so he ideally can be started on neurohormonal therapy. He may need advanced therapies evaluation in the future after further rehabilitation.    He is euvolemic on exam and had a recent LHC without any obstructive CAD that needs revascularization. He is optimized for AV fistula. He is at high operative risk for a moderate-high risk procedure but no further cardiac testing is required. 61 YO M with a history of CAD s/p 4v CABG ~2019 in [facility], DM2 (A1c 7.3%), and HTN who initially presented to OSH with abdominal pain and n/v and found to have pancreatitis with lipase > 3000 though also with elevated troponins. CT abdomen revealed acute pancreatitis and his initial LVEF was 40-45%. He developed MSOF with hypoxic respiratory failure requiring intubation and ERIC and went into hypoxic PEA arrest requiring ACLS and due to persistent hypoxia and hypotension on pressors after ROSC was cannulated to VA ECMO (LFV, RFA, no anterograde perfusion catheter) on 11/25. Notably CTH that day revealed small subdural hemorrhage.     His immediate post arrest TTE showed critical LV dysfunction which slowly improved. LHC revealed closure of his 3 vein grafts and patent LIMA-LAD. He underwent ECMO decannulation and due to severe MR underwent MitraClip prior to IABP removal. He underwent tracheostomy which has since been decannulated. He has had persistent renal failure and now dependent on dialysis. He also developed candida fungemia.    Despite all of this he is steadily improving clinically.      From a HF perspective he remains with severe LV dysfunction and BP poorly tolerant of GDMT. Wean midodrine from 20 mg TID to 15 mg TID and would continue to wean by 5 mg TID as tolerates so he ideally can be started on neurohormonal therapy. He may need advanced therapies evaluation in the future after further rehabilitation.    He is euvolemic on exam and had a recent LHC without any obstructive CAD that needs revascularization. He is optimized for AV fistula. He is at high operative risk for a moderate-high risk procedure but no further cardiac testing is required. 63 YO M with a history of CAD s/p 4v CABG ~2019 in [facility], DM2 (A1c 7.3%), and HTN who initially presented to OSH with abdominal pain and n/v and found to have pancreatitis with lipase > 3000 though also with elevated troponins. CT abdomen revealed acute pancreatitis and his initial LVEF was 40-45%. He developed MSOF with hypoxic respiratory failure requiring intubation and ERIC and went into hypoxic PEA arrest requiring ACLS and due to persistent hypoxia and hypotension on pressors after ROSC was cannulated to VA ECMO (LFV, RFA, no anterograde perfusion catheter) on 11/25. Notably CTH that day revealed small subdural hemorrhage.     His immediate post arrest TTE showed critical LV dysfunction which slowly improved. LHC revealed closure of his 3 vein grafts and patent LIMA-LAD. He underwent ECMO decannulation and due to severe MR underwent MitraClip prior to IABP removal. He underwent tracheostomy which has since been decannulated. He has had persistent renal failure and now dependent on dialysis. He also developed candida fungemia.    Despite all of this he is steadily improving clinically.      From a HF perspective he remains with severe LV dysfunction and BP poorly tolerant of GDMT. Wean midodrine from 20 mg TID to 15 mg TID and would continue to wean by 5 mg TID as tolerates so he ideally can be started on neurohormonal therapy. He may need advanced therapies evaluation in the future after further rehabilitation.    He is euvolemic on exam and had a recent LHC without any obstructive CAD that needs revascularization. He is optimized for AV fistula. He is at high operative risk for a moderate-high risk procedure but no further cardiac testing is required.

## 2023-01-18 NOTE — CONSULT NOTE ADULT - SUBJECTIVE AND OBJECTIVE BOX
62M with prolonged hospitalization, originally presented with acute pancreatitis, developing MSOF with hypoxic respiratory failure requiring intubation and ERIC. Hypoxic PEA arrest requiring ACLS and VA ECMO 11/25-12/08. Trach 12/16-1/8. Continued intermittent HD and remains on midodrine for BP support. Colorectal surgery consulted for new onset BRBPR. Pt reports constipation, straining with bowel movements, which have been painful. Pt reports BM are blood-tinged. Pt denies prior hx of hemorrhoids or bleeding per rectum.       MEDICATIONS  (STANDING):  aspirin  chewable 81 milliGRAM(s) Oral daily  atorvastatin 80 milliGRAM(s) Oral at bedtime  benzonatate 100 milliGRAM(s) Oral every 8 hours  caspofungin IVPB 50 milliGRAM(s) IV Intermittent every 24 hours  chlorhexidine 2% Cloths 1 Application(s) Topical <User Schedule>  chlorhexidine 4% Liquid 1 Application(s) Topical <User Schedule>  dextrose 50% Injectable 25 Gram(s) IV Push once  dextrose 50% Injectable 12.5 Gram(s) IV Push once  dextrose 50% Injectable 25 Gram(s) IV Push once  dextrose Oral Gel 15 Gram(s) Oral once  epoetin teena-epbx (RETACRIT) Injectable 5000 Unit(s) IV Push <User Schedule>  glucagon  Injectable 1 milliGRAM(s) IntraMuscular once  hemorrhoidal Ointment 1 Application(s) Rectal two times a day  heparin   Injectable 5000 Unit(s) SubCutaneous every 8 hours  hydrocortisone hemorrhoidal Suppository 1 Suppository(s) Rectal daily  insulin glargine Injectable (LANTUS) 20 Unit(s) SubCutaneous at bedtime  insulin lispro (ADMELOG) corrective regimen sliding scale   SubCutaneous three times a day before meals  insulin lispro (ADMELOG) corrective regimen sliding scale   SubCutaneous at bedtime  insulin lispro Injectable (ADMELOG) 8 Unit(s) SubCutaneous three times a day with meals  metoprolol tartrate 25 milliGRAM(s) Oral two times a day  midodrine 20 milliGRAM(s) Oral every 8 hours  multivitamin/minerals/iron Oral Solution (CENTRUM) 15 milliLiter(s) Oral daily  pantoprazole    Tablet 40 milliGRAM(s) Oral before breakfast  polyethylene glycol 3350 17 Gram(s) Oral daily  senna 2 Tablet(s) Oral at bedtime  sevelamer carbonate Powder 1600 milliGRAM(s) Oral three times a day with meals  thiamine 100 milliGRAM(s) Enteral Tube daily  witch hazel Pads 1 Application(s) Topical two times a day    MEDICATIONS  (PRN):  acetaminophen    Suspension .. 650 milliGRAM(s) Oral every 6 hours PRN Temp greater or equal to 38.5C (101.3F)  guaiFENesin Oral Liquid (Sugar-Free) 200 milliGRAM(s) Oral every 6 hours PRN Cough  hydrocodone/homatropine Syrup 5 milliLiter(s) Oral every 8 hours PRN Cough  simethicone 80 milliGRAM(s) Chew daily PRN Gas  sodium chloride 0.9% lock flush 10 milliLiter(s) IV Push every 1 hour PRN Pre/post blood products, medications, blood draw, and to maintain line patency      Allergies    No Known Allergies    Physical Exam:  General: NAD, resting comfortably  HEENT: NC/AT, EOMI, normal hearing, no oral lesions, no LAD, neck supple  Pulmonary: normal resp effort, CTA-B  Chest: permacath r   Abdominal: soft, ND/NT, no organomegaly  Extremities: WWP, normal strength, no clubbing/cyanosis/edema  Neuro: A/O x 3, CNs II-XII grossly intact  Rectal: posterior anal fissure with scant blood, no external/internal hemorrhoids, no masses, hard stool in vault      Vital Signs Last 24 Hrs  T(C): 36.4 (19 Jan 2023 00:42), Max: 36.8 (18 Jan 2023 03:10)  T(F): 97.5 (19 Jan 2023 00:42), Max: 98.3 (18 Jan 2023 03:10)  HR: 85 (19 Jan 2023 00:42) (73 - 85)  BP: 101/55 (19 Jan 2023 00:42) (96/54 - 112/66)  BP(mean): 72 (19 Jan 2023 00:42) (71 - 84)  RR: 18 (19 Jan 2023 00:42) (18 - 19)  SpO2: 96% (19 Jan 2023 00:42) (96% - 100%)    Parameters below as of 19 Jan 2023 00:42  Patient On (Oxygen Delivery Method): room air        I&O's Summary    17 Jan 2023 07:01  -  18 Jan 2023 07:00  --------------------------------------------------------  IN: 990 mL / OUT: 400 mL / NET: 590 mL    18 Jan 2023 07:01  -  19 Jan 2023 01:26  --------------------------------------------------------  IN: 1950 mL / OUT: 2725 mL / NET: -775 mL            LABS:                        10.2   12.84 )-----------( 225      ( 18 Jan 2023 06:36 )             33.8     01-18    133<L>  |  93<L>  |  80<H>  ----------------------------<  131<H>  4.1   |  25  |  4.60<H>    Ca    9.2      18 Jan 2023 06:36  Phos  4.0     01-18  Mg     3.2     01-18    TPro  7.8  /  Alb  3.6  /  TBili  0.4  /  DBili  x   /  AST  18  /  ALT  12  /  AlkPhos  79  01-18        CAPILLARY BLOOD GLUCOSE      POCT Blood Glucose.: 125 mg/dL (18 Jan 2023 20:45)  POCT Blood Glucose.: 142 mg/dL (18 Jan 2023 16:41)  POCT Blood Glucose.: 211 mg/dL (18 Jan 2023 10:57)  POCT Blood Glucose.: 142 mg/dL (18 Jan 2023 07:08)    LIVER FUNCTIONS - ( 18 Jan 2023 06:36 )  Alb: 3.6 g/dL / Pro: 7.8 g/dL / ALK PHOS: 79 U/L / ALT: 12 U/L / AST: 18 U/L / GGT: x

## 2023-01-18 NOTE — PROGRESS NOTE ADULT - SUBJECTIVE AND OBJECTIVE BOX
Chief complaint  Patient is a 62y old  Male who presents with a chief complaint of Acute cholecystitis, gallstone pancreatitis (18 Jan 2023 12:49)   Review of systems  Patient in chair , looks comfortable.    Labs and Fingersticks  CAPILLARY BLOOD GLUCOSE      POCT Blood Glucose.: 211 mg/dL (18 Jan 2023 10:57)  POCT Blood Glucose.: 142 mg/dL (18 Jan 2023 07:08)  POCT Blood Glucose.: 159 mg/dL (17 Jan 2023 20:59)  POCT Blood Glucose.: 148 mg/dL (17 Jan 2023 16:29)      Anion Gap, Serum: 15 (01-18 @ 06:36)  Anion Gap, Serum: 15 (01-17 @ 05:53)      Calcium, Total Serum: 9.2 (01-18 @ 06:36)  Calcium, Total Serum: 8.9 (01-17 @ 05:53)  Albumin, Serum: 3.6 (01-18 @ 06:36)    Alanine Aminotransferase (ALT/SGPT): 12 (01-18 @ 06:36)  Alkaline Phosphatase, Serum: 79 (01-18 @ 06:36)  Aspartate Aminotransferase (AST/SGOT): 18 (01-18 @ 06:36)        01-18    133<L>  |  93<L>  |  80<H>  ----------------------------<  131<H>  4.1   |  25  |  4.60<H>    Ca    9.2      18 Jan 2023 06:36  Phos  4.0     01-18  Mg     3.2     01-18    TPro  7.8  /  Alb  3.6  /  TBili  0.4  /  DBili  x   /  AST  18  /  ALT  12  /  AlkPhos  79  01-18                        10.2   12.84 )-----------( 225      ( 18 Jan 2023 06:36 )             33.8     Medications  MEDICATIONS  (STANDING):  aspirin  chewable 81 milliGRAM(s) Oral daily  atorvastatin 80 milliGRAM(s) Oral at bedtime  benzonatate 100 milliGRAM(s) Oral every 8 hours  caspofungin IVPB 50 milliGRAM(s) IV Intermittent every 24 hours  chlorhexidine 2% Cloths 1 Application(s) Topical <User Schedule>  chlorhexidine 4% Liquid 1 Application(s) Topical <User Schedule>  dextrose 50% Injectable 25 Gram(s) IV Push once  dextrose 50% Injectable 12.5 Gram(s) IV Push once  dextrose 50% Injectable 25 Gram(s) IV Push once  dextrose Oral Gel 15 Gram(s) Oral once  epoetin teena-epbx (RETACRIT) Injectable 5000 Unit(s) IV Push <User Schedule>  glucagon  Injectable 1 milliGRAM(s) IntraMuscular once  hemorrhoidal Ointment 1 Application(s) Rectal two times a day  heparin   Injectable 5000 Unit(s) SubCutaneous every 8 hours  hydrocortisone hemorrhoidal Suppository 1 Suppository(s) Rectal daily  insulin glargine Injectable (LANTUS) 18 Unit(s) SubCutaneous at bedtime  insulin lispro (ADMELOG) corrective regimen sliding scale   SubCutaneous three times a day before meals  insulin lispro (ADMELOG) corrective regimen sliding scale   SubCutaneous at bedtime  insulin lispro Injectable (ADMELOG) 6 Unit(s) SubCutaneous three times a day before meals  metoprolol tartrate 25 milliGRAM(s) Oral two times a day  midodrine 20 milliGRAM(s) Oral every 8 hours  multivitamin/minerals/iron Oral Solution (CENTRUM) 15 milliLiter(s) Oral daily  pantoprazole    Tablet 40 milliGRAM(s) Oral before breakfast  polyethylene glycol 3350 17 Gram(s) Oral daily  senna 2 Tablet(s) Oral at bedtime  sevelamer carbonate Powder 1600 milliGRAM(s) Oral three times a day with meals  thiamine 100 milliGRAM(s) Enteral Tube daily  witch hazel Pads 1 Application(s) Topical two times a day      Physical Exam  General: Patient comfortable in bed  Vital Signs Last 12 Hrs  T(F): 98.2 (01-18-23 @ 13:08), Max: 98.3 (01-18-23 @ 03:10)  HR: 78 (01-18-23 @ 13:08) (73 - 78)  BP: 112/66 (01-18-23 @ 13:08) (101/55 - 112/66)  BP(mean): 84 (01-18-23 @ 13:08) (74 - 84)  RR: 18 (01-18-23 @ 13:08) (18 - 19)  SpO2: 100% (01-18-23 @ 13:08) (99% - 100%)  Neck: No palpable thyroid nodules.  CVS: S1S2, No murmurs  Respiratory: No wheezing, no crepitations  GI: Abdomen soft, bowel sounds positive  Musculoskeletal:  edema lower extremities.   Skin: No skin rashes, no ecchymosis             Chief complaint  Patient is a 62y old  Male who presents with a chief complaint of Acute cholecystitis, gallstone pancreatitis (18 Jan 2023 12:49)   Review of systems  Patient in chair , looks comfortable.    Labs and Fingersticks  CAPILLARY BLOOD GLUCOSE      POCT Blood Glucose.: 211 mg/dL (18 Jan 2023 10:57)  POCT Blood Glucose.: 142 mg/dL (18 Jan 2023 07:08)  POCT Blood Glucose.: 159 mg/dL (17 Jan 2023 20:59)  POCT Blood Glucose.: 148 mg/dL (17 Jan 2023 16:29)      Anion Gap, Serum: 15 (01-18 @ 06:36)  Anion Gap, Serum: 15 (01-17 @ 05:53)      Calcium, Total Serum: 9.2 (01-18 @ 06:36)  Calcium, Total Serum: 8.9 (01-17 @ 05:53)  Albumin, Serum: 3.6 (01-18 @ 06:36)    Alanine Aminotransferase (ALT/SGPT): 12 (01-18 @ 06:36)  Alkaline Phosphatase, Serum: 79 (01-18 @ 06:36)  Aspartate Aminotransferase (AST/SGOT): 18 (01-18 @ 06:36)        01-18    133<L>  |  93<L>  |  80<H>  ----------------------------<  131<H>  4.1   |  25  |  4.60<H>    Ca    9.2      18 Jan 2023 06:36  Phos  4.0     01-18  Mg     3.2     01-18    TPro  7.8  /  Alb  3.6  /  TBili  0.4  /  DBili  x   /  AST  18  /  ALT  12  /  AlkPhos  79  01-18                        10.2   12.84 )-----------( 225      ( 18 Jan 2023 06:36 )             33.8     Medications  MEDICATIONS  (STANDING):  aspirin  chewable 81 milliGRAM(s) Oral daily  atorvastatin 80 milliGRAM(s) Oral at bedtime  benzonatate 100 milliGRAM(s) Oral every 8 hours  caspofungin IVPB 50 milliGRAM(s) IV Intermittent every 24 hours  chlorhexidine 2% Cloths 1 Application(s) Topical <User Schedule>  chlorhexidine 4% Liquid 1 Application(s) Topical <User Schedule>  dextrose 50% Injectable 25 Gram(s) IV Push once  dextrose 50% Injectable 12.5 Gram(s) IV Push once  dextrose 50% Injectable 25 Gram(s) IV Push once  dextrose Oral Gel 15 Gram(s) Oral once  epoetin teena-epbx (RETACRIT) Injectable 5000 Unit(s) IV Push <User Schedule>  glucagon  Injectable 1 milliGRAM(s) IntraMuscular once  hemorrhoidal Ointment 1 Application(s) Rectal two times a day  heparin   Injectable 5000 Unit(s) SubCutaneous every 8 hours  hydrocortisone hemorrhoidal Suppository 1 Suppository(s) Rectal daily  insulin glargine Injectable (LANTUS) 18 Unit(s) SubCutaneous at bedtime  insulin lispro (ADMELOG) corrective regimen sliding scale   SubCutaneous three times a day before meals  insulin lispro (ADMELOG) corrective regimen sliding scale   SubCutaneous at bedtime  insulin lispro Injectable (ADMELOG) 6 Unit(s) SubCutaneous three times a day before meals  metoprolol tartrate 25 milliGRAM(s) Oral two times a day  midodrine 20 milliGRAM(s) Oral every 8 hours  multivitamin/minerals/iron Oral Solution (CENTRUM) 15 milliLiter(s) Oral daily  pantoprazole    Tablet 40 milliGRAM(s) Oral before breakfast  polyethylene glycol 3350 17 Gram(s) Oral daily  senna 2 Tablet(s) Oral at bedtime  sevelamer carbonate Powder 1600 milliGRAM(s) Oral three times a day with meals  thiamine 100 milliGRAM(s) Enteral Tube daily  witch hazel Pads 1 Application(s) Topical two times a day      Physical Exam  General: Patient comfortable in bed  Vital Signs Last 12 Hrs  T(F): 98.2 (01-18-23 @ 13:08), Max: 98.3 (01-18-23 @ 03:10)  HR: 78 (01-18-23 @ 13:08) (73 - 78)  BP: 112/66 (01-18-23 @ 13:08) (101/55 - 112/66)  BP(mean): 84 (01-18-23 @ 13:08) (74 - 84)  RR: 18 (01-18-23 @ 13:08) (18 - 19)  SpO2: 100% (01-18-23 @ 13:08) (99% - 100%)  Neck: No palpable thyroid nodules.  CVS: S1S2, No murmurs  Respiratory: No wheezing, no crepitations  GI: Abdomen soft, bowel sounds positive  Musculoskeletal:  edema lower extremities.   Skin: No skin rashes, no ecchymosis

## 2023-01-18 NOTE — PROGRESS NOTE ADULT - SUBJECTIVE AND OBJECTIVE BOX
ADVANCED HEART FAILURE & TRANSPLANT- PROGRESS NOTE  *To reach the NS1 Team from 8am to 5pm, please call [phone].  ___________________________________________________________________________    Subjective:  - feels well and ambulating around the unit  - schedule for HD today     Medications:  acetaminophen    Suspension .. 650 milliGRAM(s) Oral every 6 hours PRN  aspirin  chewable 81 milliGRAM(s) Oral daily  atorvastatin 80 milliGRAM(s) Oral at bedtime  benzonatate 100 milliGRAM(s) Oral every 8 hours  caspofungin IVPB 50 milliGRAM(s) IV Intermittent every 24 hours  chlorhexidine 2% Cloths 1 Application(s) Topical <User Schedule>  chlorhexidine 4% Liquid 1 Application(s) Topical <User Schedule>  dextrose 50% Injectable 25 Gram(s) IV Push once  dextrose 50% Injectable 12.5 Gram(s) IV Push once  dextrose 50% Injectable 25 Gram(s) IV Push once  dextrose Oral Gel 15 Gram(s) Oral once  epoetin teena-epbx (RETACRIT) Injectable 5000 Unit(s) IV Push <User Schedule>  glucagon  Injectable 1 milliGRAM(s) IntraMuscular once  guaiFENesin Oral Liquid (Sugar-Free) 200 milliGRAM(s) Oral every 6 hours PRN  hemorrhoidal Ointment 1 Application(s) Rectal two times a day  heparin   Injectable 5000 Unit(s) SubCutaneous every 8 hours  hydrocodone/homatropine Syrup 5 milliLiter(s) Oral every 8 hours PRN  hydrocortisone hemorrhoidal Suppository 1 Suppository(s) Rectal daily  insulin glargine Injectable (LANTUS) 18 Unit(s) SubCutaneous at bedtime  insulin lispro (ADMELOG) corrective regimen sliding scale   SubCutaneous three times a day before meals  insulin lispro (ADMELOG) corrective regimen sliding scale   SubCutaneous at bedtime  insulin lispro Injectable (ADMELOG) 6 Unit(s) SubCutaneous three times a day before meals  metoprolol tartrate 25 milliGRAM(s) Oral two times a day  midodrine 20 milliGRAM(s) Oral every 8 hours  multivitamin/minerals/iron Oral Solution (CENTRUM) 15 milliLiter(s) Oral daily  pantoprazole    Tablet 40 milliGRAM(s) Oral before breakfast  polyethylene glycol 3350 17 Gram(s) Oral daily  senna 2 Tablet(s) Oral at bedtime  sevelamer carbonate Powder 1600 milliGRAM(s) Oral three times a day with meals  simethicone 80 milliGRAM(s) Chew daily PRN  sodium chloride 0.9% lock flush 10 milliLiter(s) IV Push every 1 hour PRN  thiamine 100 milliGRAM(s) Enteral Tube daily  witch hazel Pads 1 Application(s) Topical two times a day    Vitals:  Vital Signs Last 24 Hours  T(C): 36.5 (23 @ 10:55), Max: 36.9 (23 @ 23:00)  HR: 75 (23 @ 10:55) (73 - 78)  BP: 101/55 (23 @ 10:55) (96/53 - 112/61)  RR: 18 (23 @ 10:55) (18 - 19)  SpO2: 100% (23 @ 10:55) (98% - 100%)    Weight in k.7 ( @ 05:48)    I&O's Summary    2023 07:  -  2023 07:00  --------------------------------------------------------  IN: 990 mL / OUT: 400 mL / NET: 590 mL    2023 07:01  -  2023 12:50  --------------------------------------------------------  IN: 370 mL / OUT: 0 mL / NET: 370 mL        Physical Exam  General: No distress. Comfortable.  Neck: JVP ~14-16 cm  Chest: Clear to auscultation bilaterally  CV: Normal S1 and S2. No murmurs, rub, or gallops. Radial pulses normal.  Abdomen: Soft, non-distended, non-tender  Extremities: warm and well perfused   Neurology: Alert and oriented times three.    Labs:                        10.2   12.84 )-----------( 225      ( 2023 06:36 )             33.8         133<L>  |  93<L>  |  80<H>  ----------------------------<  131<H>  4.1   |  25  |  4.60<H>    Ca    9.2      2023 06:36  Phos  4.0       Mg     3.2         TPro  7.8  /  Alb  3.6  /  TBili  0.4  /  DBili  x   /  AST  18  /  ALT  12  /  AlkPhos  79         ADVANCED HEART FAILURE & TRANSPLANT- PROGRESS NOTE  *To reach the NS1 Team from 8am to 5pm, please call [phone].  ___________________________________________________________________________    Subjective:  - feels well and ambulating around the unit  - schedule for HD today     Medications:  acetaminophen    Suspension .. 650 milliGRAM(s) Oral every 6 hours PRN  aspirin  chewable 81 milliGRAM(s) Oral daily  atorvastatin 80 milliGRAM(s) Oral at bedtime  benzonatate 100 milliGRAM(s) Oral every 8 hours  caspofungin IVPB 50 milliGRAM(s) IV Intermittent every 24 hours  chlorhexidine 2% Cloths 1 Application(s) Topical <User Schedule>  chlorhexidine 4% Liquid 1 Application(s) Topical <User Schedule>  dextrose 50% Injectable 25 Gram(s) IV Push once  dextrose 50% Injectable 12.5 Gram(s) IV Push once  dextrose 50% Injectable 25 Gram(s) IV Push once  dextrose Oral Gel 15 Gram(s) Oral once  epoetin teena-epbx (RETACRIT) Injectable 5000 Unit(s) IV Push <User Schedule>  glucagon  Injectable 1 milliGRAM(s) IntraMuscular once  guaiFENesin Oral Liquid (Sugar-Free) 200 milliGRAM(s) Oral every 6 hours PRN  hemorrhoidal Ointment 1 Application(s) Rectal two times a day  heparin   Injectable 5000 Unit(s) SubCutaneous every 8 hours  hydrocodone/homatropine Syrup 5 milliLiter(s) Oral every 8 hours PRN  hydrocortisone hemorrhoidal Suppository 1 Suppository(s) Rectal daily  insulin glargine Injectable (LANTUS) 18 Unit(s) SubCutaneous at bedtime  insulin lispro (ADMELOG) corrective regimen sliding scale   SubCutaneous three times a day before meals  insulin lispro (ADMELOG) corrective regimen sliding scale   SubCutaneous at bedtime  insulin lispro Injectable (ADMELOG) 6 Unit(s) SubCutaneous three times a day before meals  metoprolol tartrate 25 milliGRAM(s) Oral two times a day  midodrine 20 milliGRAM(s) Oral every 8 hours  multivitamin/minerals/iron Oral Solution (CENTRUM) 15 milliLiter(s) Oral daily  pantoprazole    Tablet 40 milliGRAM(s) Oral before breakfast  polyethylene glycol 3350 17 Gram(s) Oral daily  senna 2 Tablet(s) Oral at bedtime  sevelamer carbonate Powder 1600 milliGRAM(s) Oral three times a day with meals  simethicone 80 milliGRAM(s) Chew daily PRN  sodium chloride 0.9% lock flush 10 milliLiter(s) IV Push every 1 hour PRN  thiamine 100 milliGRAM(s) Enteral Tube daily  witch hazel Pads 1 Application(s) Topical two times a day    Vitals:  Vital Signs Last 24 Hours  T(C): 36.5 (23 @ 10:55), Max: 36.9 (23 @ 23:00)  HR: 75 (23 @ 10:55) (73 - 78)  BP: 101/55 (23 @ 10:55) (96/53 - 112/61)  RR: 18 (23 @ 10:55) (18 - 19)  SpO2: 100% (23 @ 10:55) (98% - 100%)    Weight in k.7 ( @ 05:48)    I&O's Summary    2023 07:  -  2023 07:00  --------------------------------------------------------  IN: 990 mL / OUT: 400 mL / NET: 590 mL    2023 07:01  -  2023 12:50  --------------------------------------------------------  IN: 370 mL / OUT: 0 mL / NET: 370 mL        Physical Exam  General: No distress. Comfortable.  Neck: JVP ~14-16 cm  Chest: Clear to auscultation bilaterally  CV: Normal S1 and S2. No murmurs, rub, or gallops. Radial pulses normal.  Abdomen: Soft, non-distended, non-tender  Extremities: warm and well perfused   Neurology: Alert and oriented times three.    Labs:                        10.2   12.84 )-----------( 225      ( 2023 06:36 )             33.8         133<L>  |  93<L>  |  80<H>  ----------------------------<  131<H>  4.1   |  25  |  4.60<H>    Ca    9.2      2023 06:36  Phos  4.0       Mg     3.2         TPro  7.8  /  Alb  3.6  /  TBili  0.4  /  DBili  x   /  AST  18  /  ALT  12  /  AlkPhos  79         ADVANCED HEART FAILURE & TRANSPLANT- PROGRESS NOTE  *To reach the NS1 Team from 8am to 5pm, please call [phone].  ___________________________________________________________________________    Subjective:  - feels well and ambulating around the unit  - schedule for HD today     Medications:  acetaminophen    Suspension .. 650 milliGRAM(s) Oral every 6 hours PRN  aspirin  chewable 81 milliGRAM(s) Oral daily  atorvastatin 80 milliGRAM(s) Oral at bedtime  benzonatate 100 milliGRAM(s) Oral every 8 hours  caspofungin IVPB 50 milliGRAM(s) IV Intermittent every 24 hours  chlorhexidine 2% Cloths 1 Application(s) Topical <User Schedule>  chlorhexidine 4% Liquid 1 Application(s) Topical <User Schedule>  dextrose 50% Injectable 25 Gram(s) IV Push once  dextrose 50% Injectable 12.5 Gram(s) IV Push once  dextrose 50% Injectable 25 Gram(s) IV Push once  dextrose Oral Gel 15 Gram(s) Oral once  epoetin teena-epbx (RETACRIT) Injectable 5000 Unit(s) IV Push <User Schedule>  glucagon  Injectable 1 milliGRAM(s) IntraMuscular once  guaiFENesin Oral Liquid (Sugar-Free) 200 milliGRAM(s) Oral every 6 hours PRN  hemorrhoidal Ointment 1 Application(s) Rectal two times a day  heparin   Injectable 5000 Unit(s) SubCutaneous every 8 hours  hydrocodone/homatropine Syrup 5 milliLiter(s) Oral every 8 hours PRN  hydrocortisone hemorrhoidal Suppository 1 Suppository(s) Rectal daily  insulin glargine Injectable (LANTUS) 18 Unit(s) SubCutaneous at bedtime  insulin lispro (ADMELOG) corrective regimen sliding scale   SubCutaneous three times a day before meals  insulin lispro (ADMELOG) corrective regimen sliding scale   SubCutaneous at bedtime  insulin lispro Injectable (ADMELOG) 6 Unit(s) SubCutaneous three times a day before meals  metoprolol tartrate 25 milliGRAM(s) Oral two times a day  midodrine 20 milliGRAM(s) Oral every 8 hours  multivitamin/minerals/iron Oral Solution (CENTRUM) 15 milliLiter(s) Oral daily  pantoprazole    Tablet 40 milliGRAM(s) Oral before breakfast  polyethylene glycol 3350 17 Gram(s) Oral daily  senna 2 Tablet(s) Oral at bedtime  sevelamer carbonate Powder 1600 milliGRAM(s) Oral three times a day with meals  simethicone 80 milliGRAM(s) Chew daily PRN  sodium chloride 0.9% lock flush 10 milliLiter(s) IV Push every 1 hour PRN  thiamine 100 milliGRAM(s) Enteral Tube daily  witch hazel Pads 1 Application(s) Topical two times a day    Vitals:  Vital Signs Last 24 Hours  T(C): 36.5 (23 @ 10:55), Max: 36.9 (23 @ 23:00)  HR: 75 (23 @ 10:55) (73 - 78)  BP: 101/55 (23 @ 10:55) (96/53 - 112/61)  RR: 18 (23 @ 10:55) (18 - 19)  SpO2: 100% (23 @ 10:55) (98% - 100%)    Weight in k.7 ( @ 05:48)    I&O's Summary    2023 07:  -  2023 07:00  --------------------------------------------------------  IN: 990 mL / OUT: 400 mL / NET: 590 mL    2023 07:01  -  2023 12:50  --------------------------------------------------------  IN: 370 mL / OUT: 0 mL / NET: 370 mL        Physical Exam  General: No distress. Comfortable.  Neck: JVP ~14-16 cm  Chest: Clear to auscultation bilaterally  CV: Normal S1 and S2. No murmurs, rub, or gallops. Radial pulses normal.  Abdomen: Soft, non-distended, non-tender  Extremities: warm and well perfused   Neurology: Alert and oriented times three.    Labs:                        10.2   12.84 )-----------( 225      ( 2023 06:36 )             33.8         133<L>  |  93<L>  |  80<H>  ----------------------------<  131<H>  4.1   |  25  |  4.60<H>    Ca    9.2      2023 06:36  Phos  4.0       Mg     3.2         TPro  7.8  /  Alb  3.6  /  TBili  0.4  /  DBili  x   /  AST  18  /  ALT  12  /  AlkPhos  79

## 2023-01-18 NOTE — PROGRESS NOTE ADULT - PROBLEM SELECTOR PLAN 2
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  - BB as stated above

## 2023-01-19 ENCOUNTER — TRANSCRIPTION ENCOUNTER (OUTPATIENT)
Age: 63
End: 2023-01-19

## 2023-01-19 LAB
ALBUMIN SERPL ELPH-MCNC: 3.3 G/DL — SIGNIFICANT CHANGE UP (ref 3.3–5)
ALP SERPL-CCNC: 77 U/L — SIGNIFICANT CHANGE UP (ref 40–120)
ALT FLD-CCNC: 8 U/L — LOW (ref 10–45)
ANION GAP SERPL CALC-SCNC: 16 MMOL/L — SIGNIFICANT CHANGE UP (ref 5–17)
AST SERPL-CCNC: 17 U/L — SIGNIFICANT CHANGE UP (ref 10–40)
BILIRUB SERPL-MCNC: 0.4 MG/DL — SIGNIFICANT CHANGE UP (ref 0.2–1.2)
BLD GP AB SCN SERPL QL: NEGATIVE — SIGNIFICANT CHANGE UP
BUN SERPL-MCNC: 55 MG/DL — HIGH (ref 7–23)
CALCIUM SERPL-MCNC: 8.7 MG/DL — SIGNIFICANT CHANGE UP (ref 8.4–10.5)
CHLORIDE SERPL-SCNC: 95 MMOL/L — LOW (ref 96–108)
CO2 SERPL-SCNC: 23 MMOL/L — SIGNIFICANT CHANGE UP (ref 22–31)
CREAT SERPL-MCNC: 3.16 MG/DL — HIGH (ref 0.5–1.3)
EGFR: 21 ML/MIN/1.73M2 — LOW
FLUAV AG NPH QL: SIGNIFICANT CHANGE UP
FLUBV AG NPH QL: SIGNIFICANT CHANGE UP
GLUCOSE BLDC GLUCOMTR-MCNC: 107 MG/DL — HIGH (ref 70–99)
GLUCOSE BLDC GLUCOMTR-MCNC: 118 MG/DL — HIGH (ref 70–99)
GLUCOSE BLDC GLUCOMTR-MCNC: 122 MG/DL — HIGH (ref 70–99)
GLUCOSE BLDC GLUCOMTR-MCNC: 134 MG/DL — HIGH (ref 70–99)
GLUCOSE SERPL-MCNC: 124 MG/DL — HIGH (ref 70–99)
HCT VFR BLD CALC: 30.3 % — LOW (ref 39–50)
HGB BLD-MCNC: 9.1 G/DL — LOW (ref 13–17)
MAGNESIUM SERPL-MCNC: 2.4 MG/DL — SIGNIFICANT CHANGE UP (ref 1.6–2.6)
MCHC RBC-ENTMCNC: 29.7 PG — SIGNIFICANT CHANGE UP (ref 27–34)
MCHC RBC-ENTMCNC: 30 GM/DL — LOW (ref 32–36)
MCV RBC AUTO: 99 FL — SIGNIFICANT CHANGE UP (ref 80–100)
NRBC # BLD: 0 /100 WBCS — SIGNIFICANT CHANGE UP (ref 0–0)
PHOSPHATE SERPL-MCNC: 3.2 MG/DL — SIGNIFICANT CHANGE UP (ref 2.5–4.5)
PLATELET # BLD AUTO: 169 K/UL — SIGNIFICANT CHANGE UP (ref 150–400)
POTASSIUM SERPL-MCNC: 3.1 MMOL/L — LOW (ref 3.5–5.3)
POTASSIUM SERPL-SCNC: 3.1 MMOL/L — LOW (ref 3.5–5.3)
PROT SERPL-MCNC: 7.1 G/DL — SIGNIFICANT CHANGE UP (ref 6–8.3)
RBC # BLD: 3.06 M/UL — LOW (ref 4.2–5.8)
RBC # FLD: 19 % — HIGH (ref 10.3–14.5)
RH IG SCN BLD-IMP: POSITIVE — SIGNIFICANT CHANGE UP
RSV RNA NPH QL NAA+NON-PROBE: SIGNIFICANT CHANGE UP
SARS-COV-2 RNA SPEC QL NAA+PROBE: SIGNIFICANT CHANGE UP
SODIUM SERPL-SCNC: 134 MMOL/L — LOW (ref 135–145)
WBC # BLD: 10.68 K/UL — HIGH (ref 3.8–10.5)
WBC # FLD AUTO: 10.68 K/UL — HIGH (ref 3.8–10.5)

## 2023-01-19 PROCEDURE — 99233 SBSQ HOSP IP/OBS HIGH 50: CPT

## 2023-01-19 PROCEDURE — 99232 SBSQ HOSP IP/OBS MODERATE 35: CPT | Mod: 24

## 2023-01-19 RX ORDER — MIDODRINE HYDROCHLORIDE 2.5 MG/1
15 TABLET ORAL EVERY 8 HOURS
Refills: 0 | Status: DISCONTINUED | OUTPATIENT
Start: 2023-01-19 | End: 2023-01-20

## 2023-01-19 RX ORDER — SEVELAMER CARBONATE 2400 MG/1
800 POWDER, FOR SUSPENSION ORAL
Refills: 0 | Status: DISCONTINUED | OUTPATIENT
Start: 2023-01-19 | End: 2023-01-20

## 2023-01-19 RX ORDER — POTASSIUM CHLORIDE 20 MEQ
20 PACKET (EA) ORAL ONCE
Refills: 0 | Status: COMPLETED | OUTPATIENT
Start: 2023-01-19 | End: 2023-01-19

## 2023-01-19 RX ORDER — HEPARIN SODIUM 5000 [USP'U]/ML
5000 INJECTION INTRAVENOUS; SUBCUTANEOUS ONCE
Refills: 0 | Status: COMPLETED | OUTPATIENT
Start: 2023-01-19 | End: 2023-01-19

## 2023-01-19 RX ORDER — HYDROCORTISONE 1 %
1 OINTMENT (GRAM) TOPICAL THREE TIMES A DAY
Refills: 0 | Status: DISCONTINUED | OUTPATIENT
Start: 2023-01-19 | End: 2023-01-20

## 2023-01-19 RX ADMIN — SEVELAMER CARBONATE 800 MILLIGRAM(S): 2400 POWDER, FOR SUSPENSION ORAL at 16:56

## 2023-01-19 RX ADMIN — MIDODRINE HYDROCHLORIDE 15 MILLIGRAM(S): 2.5 TABLET ORAL at 23:02

## 2023-01-19 RX ADMIN — CHLORHEXIDINE GLUCONATE 1 APPLICATION(S): 213 SOLUTION TOPICAL at 06:37

## 2023-01-19 RX ADMIN — INSULIN GLARGINE 20 UNIT(S): 100 INJECTION, SOLUTION SUBCUTANEOUS at 23:24

## 2023-01-19 RX ADMIN — ATORVASTATIN CALCIUM 80 MILLIGRAM(S): 80 TABLET, FILM COATED ORAL at 23:01

## 2023-01-19 RX ADMIN — SEVELAMER CARBONATE 1600 MILLIGRAM(S): 2400 POWDER, FOR SUSPENSION ORAL at 07:44

## 2023-01-19 RX ADMIN — Medication 650 MILLIGRAM(S): at 09:30

## 2023-01-19 RX ADMIN — Medication 8 UNIT(S): at 11:33

## 2023-01-19 RX ADMIN — Medication 100 MILLIGRAM(S): at 13:24

## 2023-01-19 RX ADMIN — Medication 8 UNIT(S): at 07:44

## 2023-01-19 RX ADMIN — Medication 650 MILLIGRAM(S): at 09:00

## 2023-01-19 RX ADMIN — ERYTHROPOIETIN 5000 UNIT(S): 10000 INJECTION, SOLUTION INTRAVENOUS; SUBCUTANEOUS at 20:02

## 2023-01-19 RX ADMIN — Medication 15 MILLILITER(S): at 11:25

## 2023-01-19 RX ADMIN — SENNA PLUS 2 TABLET(S): 8.6 TABLET ORAL at 23:02

## 2023-01-19 RX ADMIN — Medication 81 MILLIGRAM(S): at 11:25

## 2023-01-19 RX ADMIN — PANTOPRAZOLE SODIUM 40 MILLIGRAM(S): 20 TABLET, DELAYED RELEASE ORAL at 05:23

## 2023-01-19 RX ADMIN — Medication 200 MILLIGRAM(S): at 03:09

## 2023-01-19 RX ADMIN — Medication 20 MILLIEQUIVALENT(S): at 13:23

## 2023-01-19 RX ADMIN — Medication 1 SUPPOSITORY(S): at 11:25

## 2023-01-19 RX ADMIN — HEPARIN SODIUM 5000 UNIT(S): 5000 INJECTION INTRAVENOUS; SUBCUTANEOUS at 00:31

## 2023-01-19 RX ADMIN — Medication 25 MILLIGRAM(S): at 17:17

## 2023-01-19 RX ADMIN — POLYETHYLENE GLYCOL 3350 17 GRAM(S): 17 POWDER, FOR SOLUTION ORAL at 07:44

## 2023-01-19 RX ADMIN — SIMETHICONE 80 MILLIGRAM(S): 80 TABLET, CHEWABLE ORAL at 20:36

## 2023-01-19 RX ADMIN — SEVELAMER CARBONATE 1600 MILLIGRAM(S): 2400 POWDER, FOR SUSPENSION ORAL at 11:34

## 2023-01-19 RX ADMIN — Medication 100 MILLIGRAM(S): at 05:23

## 2023-01-19 RX ADMIN — Medication 25 MILLIGRAM(S): at 05:23

## 2023-01-19 RX ADMIN — MIDODRINE HYDROCHLORIDE 15 MILLIGRAM(S): 2.5 TABLET ORAL at 13:24

## 2023-01-19 RX ADMIN — AER TRAVELER 1 APPLICATION(S): 0.5 SOLUTION RECTAL; TOPICAL at 05:25

## 2023-01-19 RX ADMIN — Medication 650 MILLIGRAM(S): at 00:29

## 2023-01-19 RX ADMIN — Medication 100 MILLIGRAM(S): at 23:01

## 2023-01-19 RX ADMIN — Medication 100 MILLIGRAM(S): at 11:24

## 2023-01-19 RX ADMIN — Medication 8 UNIT(S): at 16:50

## 2023-01-19 RX ADMIN — HEPARIN SODIUM 5000 UNIT(S): 5000 INJECTION INTRAVENOUS; SUBCUTANEOUS at 23:21

## 2023-01-19 RX ADMIN — SENNA PLUS 2 TABLET(S): 8.6 TABLET ORAL at 00:31

## 2023-01-19 RX ADMIN — CASPOFUNGIN ACETATE 260 MILLIGRAM(S): 7 INJECTION, POWDER, LYOPHILIZED, FOR SOLUTION INTRAVENOUS at 09:09

## 2023-01-19 RX ADMIN — HEPARIN SODIUM 5000 UNIT(S): 5000 INJECTION INTRAVENOUS; SUBCUTANEOUS at 09:08

## 2023-01-19 NOTE — PROGRESS NOTE ADULT - SUBJECTIVE AND OBJECTIVE BOX
Chief complaint  Patient is a 62y old  Male who presents with a chief complaint of Acute cholecystitis, gallstone pancreatitis (19 Jan 2023 10:01)     Patient seen in bed, looks comfortable. Wound vac change at bedside w/ PT.     Labs and Fingersticks  CAPILLARY BLOOD GLUCOSE      POCT Blood Glucose.: 134 mg/dL (19 Jan 2023 11:11)  POCT Blood Glucose.: 122 mg/dL (19 Jan 2023 07:16)  POCT Blood Glucose.: 125 mg/dL (18 Jan 2023 20:45)  POCT Blood Glucose.: 142 mg/dL (18 Jan 2023 16:41)      Anion Gap, Serum: 16 (01-19 @ 09:52)  Anion Gap, Serum: 15 (01-18 @ 06:36)      Calcium, Total Serum: 8.7 (01-19 @ 09:52)  Calcium, Total Serum: 9.2 (01-18 @ 06:36)  Albumin, Serum: 3.3 (01-19 @ 09:52)  Albumin, Serum: 3.6 (01-18 @ 06:36)    Alanine Aminotransferase (ALT/SGPT): 8 *L* (01-19 @ 09:52)  Alanine Aminotransferase (ALT/SGPT): 12 (01-18 @ 06:36)  Alkaline Phosphatase, Serum: 77 (01-19 @ 09:52)  Alkaline Phosphatase, Serum: 79 (01-18 @ 06:36)  Aspartate Aminotransferase (AST/SGOT): 17 (01-19 @ 09:52)  Aspartate Aminotransferase (AST/SGOT): 18 (01-18 @ 06:36)        01-19    134<L>  |  95<L>  |  55<H>  ----------------------------<  124<H>  3.1<L>   |  23  |  3.16<H>    Ca    8.7      19 Jan 2023 09:52  Phos  3.2     01-19  Mg     2.4     01-19    TPro  7.1  /  Alb  3.3  /  TBili  0.4  /  DBili  x   /  AST  17  /  ALT  8<L>  /  AlkPhos  77  01-19                        9.1    10.68 )-----------( 169      ( 19 Jan 2023 09:53 )             30.3     Medications  MEDICATIONS  (STANDING):  aspirin  chewable 81 milliGRAM(s) Oral daily  atorvastatin 80 milliGRAM(s) Oral at bedtime  benzonatate 100 milliGRAM(s) Oral every 8 hours  caspofungin IVPB 50 milliGRAM(s) IV Intermittent every 24 hours  chlorhexidine 2% Cloths 1 Application(s) Topical <User Schedule>  chlorhexidine 4% Liquid 1 Application(s) Topical <User Schedule>  dextrose 50% Injectable 25 Gram(s) IV Push once  dextrose 50% Injectable 12.5 Gram(s) IV Push once  dextrose 50% Injectable 25 Gram(s) IV Push once  dextrose Oral Gel 15 Gram(s) Oral once  epoetin teena-epbx (RETACRIT) Injectable 5000 Unit(s) IV Push <User Schedule>  glucagon  Injectable 1 milliGRAM(s) IntraMuscular once  hemorrhoidal Ointment 1 Application(s) Rectal two times a day  heparin   Injectable 5000 Unit(s) SubCutaneous every 8 hours  hydrocortisone hemorrhoidal Suppository 1 Suppository(s) Rectal daily  insulin glargine Injectable (LANTUS) 20 Unit(s) SubCutaneous at bedtime  insulin lispro (ADMELOG) corrective regimen sliding scale   SubCutaneous three times a day before meals  insulin lispro (ADMELOG) corrective regimen sliding scale   SubCutaneous at bedtime  insulin lispro Injectable (ADMELOG) 8 Unit(s) SubCutaneous three times a day with meals  metoprolol tartrate 25 milliGRAM(s) Oral two times a day  midodrine 15 milliGRAM(s) Oral every 8 hours  multivitamin/minerals/iron Oral Solution (CENTRUM) 15 milliLiter(s) Oral daily  pantoprazole    Tablet 40 milliGRAM(s) Oral before breakfast  polyethylene glycol 3350 17 Gram(s) Oral daily  senna 2 Tablet(s) Oral at bedtime  sevelamer carbonate Powder 1600 milliGRAM(s) Oral three times a day with meals  thiamine 100 milliGRAM(s) Enteral Tube daily  witch hazel Pads 1 Application(s) Topical two times a day      Physical Exam  General: Patient comfortable in bed  Vital Signs Last 12 Hrs  T(F): 98 (01-19-23 @ 07:01), Max: 98 (01-19-23 @ 07:01)  HR: 82 (01-19-23 @ 07:01) (82 - 85)  BP: 112/56 (01-19-23 @ 07:01) (101/55 - 112/56)  BP(mean): 80 (01-19-23 @ 07:01) (72 - 80)  RR: 18 (01-19-23 @ 07:01) (18 - 18)  SpO2: 96% (01-19-23 @ 07:01) (94% - 96%)  Neck: No palpable thyroid nodules.  CVS: S1S2, No murmurs  Respiratory: No wheezing, no crepitations  GI: Abdomen soft, bowel sounds positive  Musculoskeletal:  edema lower extremities.   Skin: No skin rashes, no ecchymosis, wound vac groin   mid sternal incision

## 2023-01-19 NOTE — PROGRESS NOTE ADULT - ASSESSMENT
61 YO M with a history of CAD s/p 4v CABG ~2019 in [facility], DM2 (A1c 7.3%), and HTN who initially presented to OSH with abdominal pain and n/v and found to have pancreatitis with lipase > 3000 though also with elevated troponins. CT abdomen revealed acute pancreatitis and his initial LVEF was 40-45%. He developed MSOF with hypoxic respiratory failure requiring intubation and ERIC and went into hypoxic PEA arrest requiring ACLS and due to persistent hypoxia and hypotension on pressors after ROSC was cannulated to VA ECMO (LFV, RFA, no anterograde perfusion catheter) on 11/25. Notably CTH that day revealed small subdural hemorrhage.     His post arrest TTE on 11/26 showed a significantly worse LVEF of 5-10% with an AV that was only minimally opening. Since then he has had improvement in his LV function to ~35-40% and improved pulsatility while tolerating progressive slow ECMO weans. ECMO turndown (note in chart 11/30) was reassuring for ability to decannulate to IABP/inotropes. LHC 12/06 showed closure of his 3 vein grafts with graft to LAD patent though with distal native disease. No coronary intervention was done. IABP placed, ECMO successfully decannulated 12/08 (transfused 2u pRBCs during).     His ARDS has improved and he's now s/p trach (decannulated 1/8). He's now off IABP, removed 12/17, and inotropes, however has previously been unable to tolerate GDMT due to soft BPs and renal failure requiring dialysis. 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 He's afebrile with improving leukocytosis. He has urine output but not enough to stay euvolemic. He's tolerating intermittent HD and remains on midodrine for BP support.     11/24 Transfer from [facility] to SICU c/f STEMI, abd US +GB stones, pericholecystic fluid  11/25 VA ECMO s/p hypoxic respiratory arrest/PEA arrest, CTH 4mm subdural, ERIC  12/5 CVVHD, RUQ US neg   12/6 IABP placed in cath lab, LIMA to LAD patent, RCA and LCx down, CTH subdural decreased in size, persistent pancreatic inflammation   12/7 TTE turndown, EF 30-40%, nml RV, MR mod, mod TR  12/8 ECMO decannulation, aflutter DCCV x 2  12/12 Mental status change- CTH no change, CT perfusion/CTA H/N neg for LVO, +low grade watershed infarct to L MCA  12/16 Mitral clip x 1, TRACH 7 Shiley XLT w/ ENT  12/17 IABP dced  12/26 +clinton BCx   12/27 R groin vac  1/8 trach decannulation   1/10 passed FEEST  1/11 R permacath placed by IR  1/13 Transferred to SDU  1/14 VSS; RSR ; hd as per renal MWF; continue vac to rt groin ; nutritional optimization and PT; [facility] sx called for AV fistula placement on this admission (vein mapping completed 12/22) ; continue capsofungin as per Dr. Horton; endo consulted today for dm management- placed on lantus and admelog  1/15 VSS; RSR 70-80; HD as per renal - k 3.8 today; continue nutritional support and pt; prealbumin 21; await date of AVF from [facility] sx--> pt cleared as per cardiac surgeon Dr. Horton- pt must have AVF with cardiac anesthesia-discussed with [facility] sx fellow Rajeev Ascencio; continue vac to rt groin; bs improved on lantus/ admelog   discharge planning- acute rehab when stable   1/16      HD,   rt groin vac     labs stable  1/17 Increase ambulation.   AVF this week, will need cardiac anesthesia   May transfer to floor  Cont capsofungin  1/18 AVF this week with cardiac anesthesia/vascular, cleared by Dr horton .  Remains on capsofungin prophylactically, completed coursse  + cough, HD today , cxr done, clear  1/19 AVF tomorrow.  Additional HD session today and likely Saturday  The patient would like to go home instead of rehab, choosing [facility] in [place]        61 YO M with a history of CAD s/p 4v CABG ~2019 in [facility], DM2 (A1c 7.3%), and HTN who initially presented to OSH with abdominal pain and n/v and found to have pancreatitis with lipase > 3000 though also with elevated troponins. CT abdomen revealed acute pancreatitis and his initial LVEF was 40-45%. He developed MSOF with hypoxic respiratory failure requiring intubation and ERIC and went into hypoxic PEA arrest requiring ACLS and due to persistent hypoxia and hypotension on pressors after ROSC was cannulated to VA ECMO (LFV, RFA, no anterograde perfusion catheter) on 11/25. Notably CTH that day revealed small subdural hemorrhage.     His post arrest TTE on 11/26 showed a significantly worse LVEF of 5-10% with an AV that was only minimally opening. Since then he has had improvement in his LV function to ~35-40% and improved pulsatility while tolerating progressive slow ECMO weans. ECMO turndown (note in chart 11/30) was reassuring for ability to decannulate to IABP/inotropes. LHC 12/06 showed closure of his 3 vein grafts with graft to LAD patent though with distal native disease. No coronary intervention was done. IABP placed, ECMO successfully decannulated 12/08 (transfused 2u pRBCs during).     His ARDS has improved and he's now s/p trach (decannulated 1/8). He's now off IABP, removed 12/17, and inotropes, however has previously been unable to tolerate GDMT due to soft BPs and renal failure requiring dialysis. 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 He's afebrile with improving leukocytosis. He has urine output but not enough to stay euvolemic. He's tolerating intermittent HD and remains on midodrine for BP support.     11/24 Transfer from [facility] to SICU c/f STEMI, abd US +GB stones, pericholecystic fluid  11/25 VA ECMO s/p hypoxic respiratory arrest/PEA arrest, CTH 4mm subdural, ERIC  12/5 CVVHD, RUQ US neg   12/6 IABP placed in cath lab, LIMA to LAD patent, RCA and LCx down, CTH subdural decreased in size, persistent pancreatic inflammation   12/7 TTE turndown, EF 30-40%, nml RV, MR mod, mod TR  12/8 ECMO decannulation, aflutter DCCV x 2  12/12 Mental status change- CTH no change, CT perfusion/CTA H/N neg for LVO, +low grade watershed infarct to L MCA  12/16 Mitral clip x 1, TRACH 7 Shiley XLT w/ ENT  12/17 IABP dced  12/26 +clinton BCx   12/27 R groin vac  1/8 trach decannulation   1/10 passed FEEST  1/11 R permacath placed by IR  1/13 Transferred to SDU  1/14 VSS; RSR ; hd as per renal MWF; continue vac to rt groin ; nutritional optimization and PT; [facility] sx called for AV fistula placement on this admission (vein mapping completed 12/22) ; continue capsofungin as per Dr. Horton; endo consulted today for dm management- placed on lantus and admelog  1/15 VSS; RSR 70-80; HD as per renal - k 3.8 today; continue nutritional support and pt; prealbumin 21; await date of AVF from [facility] sx--> pt cleared as per cardiac surgeon Dr. Horton- pt must have AVF with cardiac anesthesia-discussed with [facility] sx fellow Rajeev Ascencio; continue vac to rt groin; bs improved on lantus/ admelog   discharge planning- acute rehab when stable   1/16      HD,   rt groin vac     labs stable  1/17 Increase ambulation.   AVF this week, will need cardiac anesthesia   May transfer to floor  Cont capsofungin  1/18 AVF this week with cardiac anesthesia/vascular, cleared by Dr horton .  Remains on capsofungin prophylactically, completed coursse  + cough, HD today , cxr done, clear  1/19 AVF tomorrow.  Additional HD session today and likely Saturday  The patient would like to go home instead of rehab, choosing [facility] in [place]        63 YO M with a history of CAD s/p 4v CABG ~2019 in [facility], DM2 (A1c 7.3%), and HTN who initially presented to OSH with abdominal pain and n/v and found to have pancreatitis with lipase > 3000 though also with elevated troponins. CT abdomen revealed acute pancreatitis and his initial LVEF was 40-45%. He developed MSOF with hypoxic respiratory failure requiring intubation and ERIC and went into hypoxic PEA arrest requiring ACLS and due to persistent hypoxia and hypotension on pressors after ROSC was cannulated to VA ECMO (LFV, RFA, no anterograde perfusion catheter) on 11/25. Notably CTH that day revealed small subdural hemorrhage.     His post arrest TTE on 11/26 showed a significantly worse LVEF of 5-10% with an AV that was only minimally opening. Since then he has had improvement in his LV function to ~35-40% and improved pulsatility while tolerating progressive slow ECMO weans. ECMO turndown (note in chart 11/30) was reassuring for ability to decannulate to IABP/inotropes. LHC 12/06 showed closure of his 3 vein grafts with graft to LAD patent though with distal native disease. No coronary intervention was done. IABP placed, ECMO successfully decannulated 12/08 (transfused 2u pRBCs during).     His ARDS has improved and he's now s/p trach (decannulated 1/8). He's now off IABP, removed 12/17, and inotropes, however has previously been unable to tolerate GDMT due to soft BPs and renal failure requiring dialysis. 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 He's afebrile with improving leukocytosis. He has urine output but not enough to stay euvolemic. He's tolerating intermittent HD and remains on midodrine for BP support.     11/24 Transfer from [facility] to SICU c/f STEMI, abd US +GB stones, pericholecystic fluid  11/25 VA ECMO s/p hypoxic respiratory arrest/PEA arrest, CTH 4mm subdural, ERIC  12/5 CVVHD, RUQ US neg   12/6 IABP placed in cath lab, LIMA to LAD patent, RCA and LCx down, CTH subdural decreased in size, persistent pancreatic inflammation   12/7 TTE turndown, EF 30-40%, nml RV, MR mod, mod TR  12/8 ECMO decannulation, aflutter DCCV x 2  12/12 Mental status change- CTH no change, CT perfusion/CTA H/N neg for LVO, +low grade watershed infarct to L MCA  12/16 Mitral clip x 1, TRACH 7 Shiley XLT w/ ENT  12/17 IABP dced  12/26 +clinton BCx   12/27 R groin vac  1/8 trach decannulation   1/10 passed FEEST  1/11 R permacath placed by IR  1/13 Transferred to SDU  1/14 VSS; RSR ; hd as per renal MWF; continue vac to rt groin ; nutritional optimization and PT; [facility] sx called for AV fistula placement on this admission (vein mapping completed 12/22) ; continue capsofungin as per Dr. Horton; endo consulted today for dm management- placed on lantus and admelog  1/15 VSS; RSR 70-80; HD as per renal - k 3.8 today; continue nutritional support and pt; prealbumin 21; await date of AVF from [facility] sx--> pt cleared as per cardiac surgeon Dr. Horton- pt must have AVF with cardiac anesthesia-discussed with [facility] sx fellow Rajeev Ascencio; continue vac to rt groin; bs improved on lantus/ admelog   discharge planning- acute rehab when stable   1/16      HD,   rt groin vac     labs stable  1/17 Increase ambulation.   AVF this week, will need cardiac anesthesia   May transfer to floor  Cont capsofungin  1/18 AVF this week with cardiac anesthesia/vascular, cleared by Dr horton .  Remains on capsofungin prophylactically, completed coursse  + cough, HD today , cxr done, clear  1/19 AVF tomorrow.  Additional HD session today and likely Saturday  The patient would like to go home instead of rehab, choosing [facility] in [place]

## 2023-01-19 NOTE — PROGRESS NOTE ADULT - PROBLEM SELECTOR PLAN 3
in the setting of ERIC. Recent phos level at goal. Lowered dose of sevelamer. Monitor Ca, phos levels.

## 2023-01-19 NOTE — PROGRESS NOTE ADULT - ASSESSMENT
Assessment: 62M w/HTN, DM2, CAD s/p CABG found to have acute gallstone pancreatitis; developed hypoxic respiratory failure requiring intubation later leading to cardiac arrest requiring VA ECMO, now weaned off. Now s/p trach but off vent. Hospital course c/b fungemia and ATN now requiring intermittent HD, now s/p R permacath placement by IR. Patient currently downgraded to the floor. Primary team requesting inpatient LUE AVF. Vein mapping completed 12/22/22. OR tomorrow for right AVF.    Plan:  - Keep RUE protected (no lines, IVs, blood draws, etc.)  - c/w ASA, SQ heparin  - please obtain documented cardiology and medicine clearance     Vascular Surgery  [id number] Assessment: 62M w/HTN, DM2, CAD s/p CABG found to have acute gallstone pancreatitis; developed hypoxic respiratory failure requiring intubation later leading to cardiac arrest requiring VA ECMO, now weaned off. Now s/p trach but off vent. Hospital course c/b fungemia and ATN now requiring intermittent HD, now s/p R permacath placement by IR. Patient currently downgraded to the floor. Primary team requesting inpatient LUE AVF. Vein mapping completed 12/22/22. OR tomorrow for right AVF.    Plan:  - Keep RUE protected (no lines, IVs, blood draws, etc.)  - c/w ASA, SQ heparin  - please obtain documented cardiology and medicine clearance     Vascular Surgery  [id number] Assessment: 62M w/HTN, DM2, CAD s/p CABG found to have acute gallstone pancreatitis; developed hypoxic respiratory failure requiring intubation later leading to cardiac arrest requiring VA ECMO, now weaned off. Now s/p trach but off vent. Hospital course c/b fungemia and ATN now requiring intermittent HD, now s/p R permacath placement by IR. Patient currently downgraded to the floor. Primary team requesting inpatient LUE AVF. Vein mapping completed 12/22/22. OR tomorrow for right AVF.    Plan:  - Keep RUE protected (no lines, IVs, blood draws, etc.)  - c/w ASA, SQ heparin  - please obtain documented cardiology and medicine clearance     Vascular Surgery  [id number] Assessment: 62M w/HTN, DM2, CAD s/p CABG found to have acute gallstone pancreatitis; developed hypoxic respiratory failure requiring intubation later leading to cardiac arrest requiring VA ECMO, now weaned off. Now s/p trach but off vent. Hospital course c/b fungemia and ATN now requiring intermittent HD, now s/p R permacath placement by IR. Patient currently downgraded to the floor. Primary team requesting inpatient LUE AVF. Vein mapping completed 12/22/22. OR tomorrow for right AVF.    Plan:  - Patient must receive HD today  - Keep RUE protected (no lines, IVs, blood draws, etc.)  - c/w ASA, SQ heparin  - please obtain documented cardiology and medicine clearance     Vascular Surgery  [id number] Assessment: 62M w/HTN, DM2, CAD s/p CABG found to have acute gallstone pancreatitis; developed hypoxic respiratory failure requiring intubation later leading to cardiac arrest requiring VA ECMO, now weaned off. Now s/p trach but off vent. Hospital course c/b fungemia and ATN now requiring intermittent HD, now s/p R permacath placement by IR. Patient currently downgraded to the floor. Primary team requesting inpatient LUE AVF. Vein mapping completed 12/22/22. OR tomorrow for right AVF.    Plan:  - Patient must receive HD today  - Keep RUE protected (no lines, IVs, blood draws, etc.)  - c/w ASA, SQ heparin  - please obtain documented cardiology and medicine clearance     Vascular Surgery  [id number] Assessment: 62M w/HTN, DM2, CAD s/p CABG found to have acute gallstone pancreatitis; developed hypoxic respiratory failure requiring intubation later leading to cardiac arrest requiring VA ECMO, now weaned off. Now s/p trach but off vent. Hospital course c/b fungemia and ATN now requiring intermittent HD, now s/p R permacath placement by IR. Patient currently downgraded to the floor. Primary team requesting inpatient LUE AVF. Vein mapping completed 12/22/22. OR tomorrow for right AVF.    Plan:  - Patient must receive HD today  - Keep RUE protected (no lines, IVs, blood draws, etc.)  - c/w ASA, SQ heparin  - please obtain documented cardiology and medicine clearance     Vascular Surgery  [id number]

## 2023-01-19 NOTE — PROGRESS NOTE ADULT - PROBLEM SELECTOR PLAN 3
- was on CVVH now on iHD   - appreciate nephrology recommendations   - avoid nephrotoxins  - s/p RIJ permacath 1/11  - [facility] sx called for AV fistula placement on this admission (vein mapping completed   await date of AVF from [facility] sx--> pt cleared as per cardiac surgeon Dr. Rodrigues- pt must have AVF with cardiac anesthesia-discussed with [facility] sx fellow Rajeev Ascencio - was on CVVH now on iHD   - appreciate nephrology recommendations   - avoid nephrotoxins  - s/p RIJ permacath 1/11  - [facility] sx called for AV fistula placement on this admission (vein mapping completed   await date of AVF from [facility] sx--> pt cleared as per cardiac surgeon Dr. Rodrigues- pt must have AVF with cardiac anesthesia-discussed with [facility] sx fellow Rajeev Ascencio - was on CVVH now on iHD   - appreciate nephrology recommendations   - avoid nephrotoxins  - s/p RIJ permacath 1/11  - [facility] sx called for AV fistula placement on this admission (vein mapping completed   await date of AVF from [facility] sx--> pt cleared as per cardiac surgeon Dr. Rodrigues- pt must have AVF with cardiac anesthesia-discussed with [facility] sx fellow Rajeev Ascencio

## 2023-01-19 NOTE — PROGRESS NOTE ADULT - PROBLEM SELECTOR PLAN 1
Will continue  Lantus 20 units qHS  Will continue Pre-meal Admelog to 8 with meals.  With corrective sliding scale.   Will continue monitoring  blood sugars, will Follow up, trend.   Patient counseled for compliance with consistent low carb diet.  Patient agreeable to perform insulin injection administration    Suggest to DC home on current Lantus dose qhs, Tradjenta 5mg PO daily. With endocrine follow up. Discussed with patient/family.

## 2023-01-19 NOTE — PROGRESS NOTE ADULT - SUBJECTIVE AND OBJECTIVE BOX
Vascular Surgery Progress Note    SUBJECTIVE  No acute events overnight. Pt seen and examined on mornings rounds.    OBJECTIVE  ___________________________________________________  VITAL SIGNS / I&O's   Vital Signs Last 24 Hrs  T(C): 36.7 (19 Jan 2023 07:01), Max: 36.8 (18 Jan 2023 13:08)  T(F): 98 (19 Jan 2023 07:01), Max: 98.2 (18 Jan 2023 13:08)  HR: 82 (19 Jan 2023 07:01) (75 - 85)  BP: 112/56 (19 Jan 2023 07:01) (96/54 - 112/66)  BP(mean): 80 (19 Jan 2023 07:01) (71 - 84)  RR: 18 (19 Jan 2023 07:01) (18 - 18)  SpO2: 96% (19 Jan 2023 07:01) (94% - 100%)    Parameters below as of 19 Jan 2023 07:01  Patient On (Oxygen Delivery Method): room air          18 Jan 2023 07:01  -  19 Jan 2023 07:00  --------------------------------------------------------  IN:    IV PiggyBack: 250 mL    Oral Fluid: 900 mL    Other (mL): 900 mL  Total IN: 2050 mL    OUT:    Other (mL): 2400 mL    Voided (mL): 475 mL  Total OUT: 2875 mL    Total NET: -825 mL      19 Jan 2023 07:01  -  19 Jan 2023 10:01  --------------------------------------------------------  IN:    Oral Fluid: 300 mL  Total IN: 300 mL    OUT:  Total OUT: 0 mL    Total NET: 300 mL        ___________________________________________________  PHYSICAL EXAM    PHYSICAL EXAM:  GENERAL: NAD  HEENT: NC/AT  CHEST/LUNG: +trach, on nasal cannula  HEART: Regular rate and rhythm  ABDOMEN: Soft, nondistended.  EXTREMITIES: good distal pulses b/l   NEURO:  No focal deficits    ___________________________________________________  LABS                        10.2   12.84 )-----------( 225      ( 18 Jan 2023 06:36 )             33.8     18 Jan 2023 06:36    133    |  93     |  80     ----------------------------<  131    4.1     |  25     |  4.60     Ca    9.2        18 Jan 2023 06:36  Phos  4.0       18 Jan 2023 06:36  Mg     3.2       18 Jan 2023 06:36    TPro  7.8    /  Alb  3.6    /  TBili  0.4    /  DBili  x      /  AST  18     /  ALT  12     /  AlkPhos  79     18 Jan 2023 06:36      CAPILLARY BLOOD GLUCOSE      POCT Blood Glucose.: 122 mg/dL (19 Jan 2023 07:16)  POCT Blood Glucose.: 125 mg/dL (18 Jan 2023 20:45)  POCT Blood Glucose.: 142 mg/dL (18 Jan 2023 16:41)  POCT Blood Glucose.: 211 mg/dL (18 Jan 2023 10:57)          ___________________________________________________  MICRO  Recent Cultures:    ___________________________________________________  MEDICATIONS  (STANDING):  aspirin  chewable 81 milliGRAM(s) Oral daily  atorvastatin 80 milliGRAM(s) Oral at bedtime  benzonatate 100 milliGRAM(s) Oral every 8 hours  caspofungin IVPB 50 milliGRAM(s) IV Intermittent every 24 hours  chlorhexidine 2% Cloths 1 Application(s) Topical <User Schedule>  chlorhexidine 4% Liquid 1 Application(s) Topical <User Schedule>  dextrose 50% Injectable 25 Gram(s) IV Push once  dextrose 50% Injectable 12.5 Gram(s) IV Push once  dextrose 50% Injectable 25 Gram(s) IV Push once  dextrose Oral Gel 15 Gram(s) Oral once  epoetin teena-epbx (RETACRIT) Injectable 5000 Unit(s) IV Push <User Schedule>  glucagon  Injectable 1 milliGRAM(s) IntraMuscular once  hemorrhoidal Ointment 1 Application(s) Rectal two times a day  heparin   Injectable 5000 Unit(s) SubCutaneous every 8 hours  hydrocortisone hemorrhoidal Suppository 1 Suppository(s) Rectal daily  insulin glargine Injectable (LANTUS) 20 Unit(s) SubCutaneous at bedtime  insulin lispro (ADMELOG) corrective regimen sliding scale   SubCutaneous three times a day before meals  insulin lispro (ADMELOG) corrective regimen sliding scale   SubCutaneous at bedtime  insulin lispro Injectable (ADMELOG) 8 Unit(s) SubCutaneous three times a day with meals  metoprolol tartrate 25 milliGRAM(s) Oral two times a day  midodrine 15 milliGRAM(s) Oral every 8 hours  multivitamin/minerals/iron Oral Solution (CENTRUM) 15 milliLiter(s) Oral daily  pantoprazole    Tablet 40 milliGRAM(s) Oral before breakfast  polyethylene glycol 3350 17 Gram(s) Oral daily  senna 2 Tablet(s) Oral at bedtime  sevelamer carbonate Powder 1600 milliGRAM(s) Oral three times a day with meals  thiamine 100 milliGRAM(s) Enteral Tube daily  witch hazel Pads 1 Application(s) Topical two times a day    MEDICATIONS  (PRN):  acetaminophen    Suspension .. 650 milliGRAM(s) Oral every 6 hours PRN Temp greater or equal to 38.5C (101.3F)  guaiFENesin Oral Liquid (Sugar-Free) 200 milliGRAM(s) Oral every 6 hours PRN Cough  simethicone 80 milliGRAM(s) Chew daily PRN Gas  sodium chloride 0.9% lock flush 10 milliLiter(s) IV Push every 1 hour PRN Pre/post blood products, medications, blood draw, and to maintain line patency

## 2023-01-19 NOTE — PROGRESS NOTE ADULT - PROBLEM SELECTOR PLAN 1
Pt with ERIC/ ATN in the setting of STEMI, cardiac arrest & cardiogenic shock. SCr on arrival was 2.15; trended down to hector 1.6 on 11/25; slowly trended up & increased to 3.95 11/28. Pt initiated on CRRT/CVVHDF to optimize volume and electrolytes on 12/5/22. Pt likely with ATN. Pt underwent decannulation of ECMO on 12/8/22 and was taken off CRRT.   Pt reinitiated on CRRT 12/9/22 for volume overload, that was continued until 12/19 & was initiated on iHD thereafter.    s/p RIJ tunneled HD cath on 1/11. Had Last HD session 1/16 with UF that he tolerated well. Pt remains oliguric however reports improving UO. Labs reviewed. Had maintenance HD yesterday that he tolerated well. Plan for additional session of HD today, for optimization prior to OR for AVF creation tomorrow. Continue to monitor for renal recovery. Monitor labs and urine output. Avoid any potential nephrotoxins.  Pt. would want to continue HD at [facility] Dialysis unit as outpatient. Recommend to send paperwork to [facility] dialysis unit. Pt with ERIC/ ATN in the setting of STEMI, cardiac arrest & cardiogenic shock. SCr on arrival was 2.15; trended down to hector 1.6 on 11/25; slowly trended up & increased to 3.95 11/28. Pt initiated on CRRT/CVVHDF to optimize volume and electrolytes on 12/5/22. Pt likely with ATN. Pt underwent decannulation of ECMO on 12/8/22 and was taken off CRRT.   Pt reinitiated on CRRT 12/9/22 for volume overload, that was continued until 12/19 & was initiated on iHD thereafter.    s/p RIJ tunneled HD cath on 1/11. Had Last HD session 1/16 with UF that he tolerated well. Pt remains oliguric however reports improving UO. Labs reviewed. Had maintenance HD yesterday that he tolerated well. Plan for additional session of HD today, for optimization prior to OR for AVF creation tomorrow. Continue to monitor for renal recovery. Monitor labs and urine output. Avoid any potential nephrotoxins.  Pt. would want to continue HD at [facility] Dialysis unit as outpatient. Recommend to send paperwork to [facility] dialysis unit. Pt with ERIC/ ATN in the setting of STEMI, cardiac arrest & cardiogenic shock. SCr on arrival was 2.15; trended down to hector 1.6 on 11/25; slowly trended up & increased to 3.95 11/28. Pt initiated on CRRT/CVVHDF to optimize volume and electrolytes on 12/5/22. Pt likely with ATN. Pt underwent decannulation of ECMO on 12/8/22 and was taken off CRRT.   Pt reinitiated on CRRT 12/9/22 for volume overload, that was continued until 12/19 & was initiated on iHD thereafter.    s/p RIJ tunneled HD cath on 1/11. Had Last HD session 1/16 with UF that he tolerated well. Pt remains oliguric however reports improving UO. Labs reviewed. Had maintenance HD yesterday that he tolerated well. Plan for additional session of HD today, for optimization prior to OR for AVF creation tomorrow. Continue to monitor for renal recovery. Monitor labs and urine output. Avoid any potential nephrotoxins.  Pt. would want to continue HD at [facility] Dialysis unit as outpatient. Recommend to send paperwork to [facility] dialysis unit.

## 2023-01-19 NOTE — PROGRESS NOTE ADULT - ASSESSMENT
Assessment  DMT2: 62y Male with DM T2 A1C 7.3 with hyperglycemia admitted with CAD, patient was on NPH and insulin coverage, sugars improving, now with in target range, eating meals.  CAD: S/P CABG, on medications, monitored, asymptomatic.  HTN: On antihypertensive medications, monitored, asymptomatic.              Nayeli Quiles MD  Cell:  [phone]  Office: [phone]               Assessment  DMT2: 62y Male with DM T2 A1C 7.3 with hyperglycemia admitted with CAD, patient was on NPH and insulin coverage, sugars improving, now with in target range, eating meals.  CAD: S/P CABG, on medications, monitored, asymptomatic.  HTN: On antihypertensive medications, monitored, asymptomatic.              Nayeli Quiles MD  Cell:  [phone]  Office: [phone]               Assessment  DMT2: 62y Male with DM T2 A1C 7.3 with hyperglycemia admitted with CAD, patient was on NPH and insulin coverage, sugars improving, now with in target range, eating meals.  CAD: S/P CABG, on medications, monitored, asymptomatic.  HTN: On antihypertensive medications, monitored, asymptomatic.              Nayeli Quiles MD  Cell:  [phone]  Office: [phone]

## 2023-01-19 NOTE — PROGRESS NOTE ADULT - SUBJECTIVE AND OBJECTIVE BOX
[facility] DIVISION OF KIDNEY DISEASES AND HYPERTENSION -- PROGRESS NOTE    Chief complaint: ERIC    24 hour events/subjective: had HD yesterday        PAST HISTORY  --------------------------------------------------------------------------------  No significant changes to PMH, PSH, FHx, SHx, unless otherwise noted    ALLERGIES & MEDICATIONS  --------------------------------------------------------------------------------  Allergies    No Known Allergies    Intolerances      Standing Inpatient Medications  aspirin  chewable 81 milliGRAM(s) Oral daily  atorvastatin 80 milliGRAM(s) Oral at bedtime  benzonatate 100 milliGRAM(s) Oral every 8 hours  caspofungin IVPB 50 milliGRAM(s) IV Intermittent every 24 hours  chlorhexidine 2% Cloths 1 Application(s) Topical <User Schedule>  chlorhexidine 4% Liquid 1 Application(s) Topical <User Schedule>  dextrose 50% Injectable 25 Gram(s) IV Push once  dextrose 50% Injectable 12.5 Gram(s) IV Push once  dextrose 50% Injectable 25 Gram(s) IV Push once  dextrose Oral Gel 15 Gram(s) Oral once  epoetin teena-epbx (RETACRIT) Injectable 5000 Unit(s) IV Push <User Schedule>  glucagon  Injectable 1 milliGRAM(s) IntraMuscular once  hemorrhoidal Ointment 1 Application(s) Rectal two times a day  heparin   Injectable 5000 Unit(s) SubCutaneous every 8 hours  hydrocortisone hemorrhoidal Suppository 1 Suppository(s) Rectal daily  insulin glargine Injectable (LANTUS) 20 Unit(s) SubCutaneous at bedtime  insulin lispro (ADMELOG) corrective regimen sliding scale   SubCutaneous three times a day before meals  insulin lispro (ADMELOG) corrective regimen sliding scale   SubCutaneous at bedtime  insulin lispro Injectable (ADMELOG) 8 Unit(s) SubCutaneous three times a day with meals  metoprolol tartrate 25 milliGRAM(s) Oral two times a day  midodrine 15 milliGRAM(s) Oral every 8 hours  multivitamin/minerals/iron Oral Solution (CENTRUM) 15 milliLiter(s) Oral daily  pantoprazole    Tablet 40 milliGRAM(s) Oral before breakfast  polyethylene glycol 3350 17 Gram(s) Oral daily  potassium chloride    Tablet ER 20 milliEquivalent(s) Oral once  senna 2 Tablet(s) Oral at bedtime  sevelamer carbonate Powder 1600 milliGRAM(s) Oral three times a day with meals  thiamine 100 milliGRAM(s) Enteral Tube daily  witch hazel Pads 1 Application(s) Topical two times a day    PRN Inpatient Medications  acetaminophen    Suspension .. 650 milliGRAM(s) Oral every 6 hours PRN  guaiFENesin Oral Liquid (Sugar-Free) 200 milliGRAM(s) Oral every 6 hours PRN  simethicone 80 milliGRAM(s) Chew daily PRN  sodium chloride 0.9% lock flush 10 milliLiter(s) IV Push every 1 hour PRN      REVIEW OF SYSTEMS  --------------------------------------------------------------------------------  Constitutional: [ ] Fever [ ] Chills [ ] Fatigue [ ] Weight change   HEENT: [ ] Blurred vision [ ] Eye Pain [ ] Headache [ ] Runny nose [ ] Sore Throat   Respiratory: [ ] Cough [ ] Wheezing [ ] Shortness of breath  Cardiovascular: [ ] Chest Pain [ ] Palpitations [ ] BRUNSON [ ] PND [ ] Orthopnea  Gastrointestinal: [ ] Abdominal Pain [ ] Diarrhea [ ] Constipation [ ] Hemorrhoids [ ] Nausea [ ] Vomiting  Genitourinary: [ ] Nocturia [ ] Dysuria [ ] Incontinence  Extremities: [ ] Swelling [ ] Joint Pain  Neurologic: [ ] Focal deficit [ ] Paresthesias [ ] Syncope  Lymphatic: [ ] Swelling [ ] Lymphadenopathy   Skin: [ ] Rash [ ] Ecchymoses [ ] Wounds [ ] Lesions  Psychiatry: [ ] Depression [ ] Suicidal/Homicidal Ideation [ ] Anxiety [ ] Sleep Disturbances  [x ] 10 point review of systems is otherwise negative except as mentioned above              [ ]Unable to obtain due to   All other systems were reviewed and are negative, except as noted.    VITALS/PHYSICAL EXAM  --------------------------------------------------------------------------------  T(C): 36.9 (01-19-23 @ 10:43), Max: 36.9 (01-19-23 @ 10:43)  HR: 76 (01-19-23 @ 10:43) (76 - 85)  BP: 96/54 (01-19-23 @ 10:43) (96/54 - 112/66)  RR: 18 (01-19-23 @ 10:43) (18 - 18)  SpO2: 95% (01-19-23 @ 10:43) (94% - 100%)  Wt(kg): --        01-18-23 @ 07:01  -  01-19-23 @ 07:00  --------------------------------------------------------  IN: 2050 mL / OUT: 2875 mL / NET: -825 mL    01-19-23 @ 07:01  -  01-19-23 @ 12:29  --------------------------------------------------------  IN: 300 mL / OUT: 0 mL / NET: 300 mL      Physical Exam:  	Gen: NAD, well-appearing  	HEENT: on room air  	Pulm: CTA B/L  	CV: normal S1S2; no rub  	Abd: soft                      Back : No sacral edema  	: No michel  	LE: no edema  	Skin: Warm, without rashes  	Vascular access: IJ tunneled dialysis catheter in situ    LABS/STUDIES  --------------------------------------------------------------------------------              9.1    10.68 >-----------<  169      [01-19-23 @ 09:53]              30.3     134  |  95  |  55  ----------------------------<  124      [01-19-23 @ 09:52]  3.1   |  23  |  3.16        Ca     8.7     [01-19-23 @ 09:52]      Mg     2.4     [01-19-23 @ 09:52]      Phos  3.2     [01-19-23 @ 09:52]    TPro  7.1  /  Alb  3.3  /  TBili  0.4  /  DBili  x   /  AST  17  /  ALT  8   /  AlkPhos  77  [01-19-23 @ 09:52]          Creatinine Trend:  SCr 3.16 [01-19 @ 09:52]  SCr 4.60 [01-18 @ 06:36]  SCr 4.31 [01-17 @ 05:53]  SCr 5.90 [01-16 @ 07:08]  SCr 5.05 [01-15 @ 06:13]    Urinalysis - [12-26-22 @ 13:19]      Color Yellow / Appearance Clear / SG 1.013 / pH 6.0      Gluc Negative / Ketone Negative  / Bili Negative / Urobili Negative       Blood Large / Protein 100 mg/dL / Leuk Est Moderate / Nitrite Negative      RBC >50 / WBC 35 / Hyaline 1 / Gran  / Sq Epi  / Non Sq Epi 2 / Bacteria Few      Iron 39, TIBC 222, %sat 18      [12-31-22 @ 06:41]  Ferritin 346      [12-31-22 @ 06:42]  TSH 2.42      [01-15-23 @ 06:13]  Lipid: chol --, , HDL --, LDL --      [12-05-22 @ 00:50]    HBsAg Nonreact      [01-04-23 @ 10:02]     [facility] DIVISION OF KIDNEY DISEASES AND HYPERTENSION -- PROGRESS NOTE    Chief complaint: ERIC    24 hour events/subjective: had HD yesterday        PAST HISTORY  --------------------------------------------------------------------------------  No significant changes to PMH, PSH, FHx, SHx, unless otherwise noted    ALLERGIES & MEDICATIONS  --------------------------------------------------------------------------------  Allergies    No Known Allergies    Intolerances      Standing Inpatient Medications  aspirin  chewable 81 milliGRAM(s) Oral daily  atorvastatin 80 milliGRAM(s) Oral at bedtime  benzonatate 100 milliGRAM(s) Oral every 8 hours  caspofungin IVPB 50 milliGRAM(s) IV Intermittent every 24 hours  chlorhexidine 2% Cloths 1 Application(s) Topical <User Schedule>  chlorhexidine 4% Liquid 1 Application(s) Topical <User Schedule>  dextrose 50% Injectable 25 Gram(s) IV Push once  dextrose 50% Injectable 12.5 Gram(s) IV Push once  dextrose 50% Injectable 25 Gram(s) IV Push once  dextrose Oral Gel 15 Gram(s) Oral once  epoetin teena-epbx (RETACRIT) Injectable 5000 Unit(s) IV Push <User Schedule>  glucagon  Injectable 1 milliGRAM(s) IntraMuscular once  hemorrhoidal Ointment 1 Application(s) Rectal two times a day  heparin   Injectable 5000 Unit(s) SubCutaneous every 8 hours  hydrocortisone hemorrhoidal Suppository 1 Suppository(s) Rectal daily  insulin glargine Injectable (LANTUS) 20 Unit(s) SubCutaneous at bedtime  insulin lispro (ADMELOG) corrective regimen sliding scale   SubCutaneous three times a day before meals  insulin lispro (ADMELOG) corrective regimen sliding scale   SubCutaneous at bedtime  insulin lispro Injectable (ADMELOG) 8 Unit(s) SubCutaneous three times a day with meals  metoprolol tartrate 25 milliGRAM(s) Oral two times a day  midodrine 15 milliGRAM(s) Oral every 8 hours  multivitamin/minerals/iron Oral Solution (CENTRUM) 15 milliLiter(s) Oral daily  pantoprazole    Tablet 40 milliGRAM(s) Oral before breakfast  polyethylene glycol 3350 17 Gram(s) Oral daily  potassium chloride    Tablet ER 20 milliEquivalent(s) Oral once  senna 2 Tablet(s) Oral at bedtime  sevelamer carbonate Powder 1600 milliGRAM(s) Oral three times a day with meals  thiamine 100 milliGRAM(s) Enteral Tube daily  witch hazel Pads 1 Application(s) Topical two times a day    PRN Inpatient Medications  acetaminophen    Suspension .. 650 milliGRAM(s) Oral every 6 hours PRN  guaiFENesin Oral Liquid (Sugar-Free) 200 milliGRAM(s) Oral every 6 hours PRN  simethicone 80 milliGRAM(s) Chew daily PRN  sodium chloride 0.9% lock flush 10 milliLiter(s) IV Push every 1 hour PRN      REVIEW OF SYSTEMS  --------------------------------------------------------------------------------  Constitutional: [ ] Fever [ ] Chills [ ] Fatigue [ ] Weight change   HEENT: [ ] Blurred vision [ ] Eye Pain [ ] Headache [ ] Runny nose [ ] Sore Throat   Respiratory: [ ] Cough [ ] Wheezing [ ] Shortness of breath  Cardiovascular: [ ] Chest Pain [ ] Palpitations [ ] BRUNSON [ ] PND [ ] Orthopnea  Gastrointestinal: [ ] Abdominal Pain [ ] Diarrhea [ ] Constipation [ ] Hemorrhoids [ ] Nausea [ ] Vomiting  Genitourinary: [ ] Nocturia [ ] Dysuria [ ] Incontinence  Extremities: [ ] Swelling [ ] Joint Pain  Neurologic: [ ] Focal deficit [ ] Paresthesias [ ] Syncope  Lymphatic: [ ] Swelling [ ] Lymphadenopathy   Skin: [ ] Rash [ ] Ecchymoses [ ] Wounds [ ] Lesions  Psychiatry: [ ] Depression [ ] Suicidal/Homicidal Ideation [ ] Anxiety [ ] Sleep Disturbances  [x ] 10 point review of systems is otherwise negative except as mentioned above              [ ]Unable to obtain due to   All other systems were reviewed and are negative, except as noted.    VITALS/PHYSICAL EXAM  --------------------------------------------------------------------------------  T(C): 36.9 (01-19-23 @ 10:43), Max: 36.9 (01-19-23 @ 10:43)  HR: 76 (01-19-23 @ 10:43) (76 - 85)  BP: 96/54 (01-19-23 @ 10:43) (96/54 - 112/66)  RR: 18 (01-19-23 @ 10:43) (18 - 18)  SpO2: 95% (01-19-23 @ 10:43) (94% - 100%)  Wt(kg): --        01-18-23 @ 07:01  -  01-19-23 @ 07:00  --------------------------------------------------------  IN: 2050 mL / OUT: 2875 mL / NET: -825 mL    01-19-23 @ 07:01  -  01-19-23 @ 12:29  --------------------------------------------------------  IN: 300 mL / OUT: 0 mL / NET: 300 mL      Physical Exam:  	Gen: NAD, well-appearing  	HEENT: on room air  	Pulm: CTA B/L  	CV: normal S1S2; no rub  	Abd: soft                      Back : No sacral edema  	: No michel  	LE: no edema  	Skin: Warm, without rashes  	Vascular access: IJ tunneled dialysis catheter in situ    LABS/STUDIES  --------------------------------------------------------------------------------              9.1    10.68 >-----------<  169      [01-19-23 @ 09:53]              30.3     134  |  95  |  55  ----------------------------<  124      [01-19-23 @ 09:52]  3.1   |  23  |  3.16        Ca     8.7     [01-19-23 @ 09:52]      Mg     2.4     [01-19-23 @ 09:52]      Phos  3.2     [01-19-23 @ 09:52]    TPro  7.1  /  Alb  3.3  /  TBili  0.4  /  DBili  x   /  AST  17  /  ALT  8   /  AlkPhos  77  [01-19-23 @ 09:52]          Creatinine Trend:  SCr 3.16 [01-19 @ 09:52]  SCr 4.60 [01-18 @ 06:36]  SCr 4.31 [01-17 @ 05:53]  SCr 5.90 [01-16 @ 07:08]  SCr 5.05 [01-15 @ 06:13]    Urinalysis - [12-26-22 @ 13:19]      Color Yellow / Appearance Clear / SG 1.013 / pH 6.0      Gluc Negative / Ketone Negative  / Bili Negative / Urobili Negative       Blood Large / Protein 100 mg/dL / Leuk Est Moderate / Nitrite Negative      RBC >50 / WBC 35 / Hyaline 1 / Gran  / Sq Epi  / Non Sq Epi 2 / Bacteria Few      Iron 39, TIBC 222, %sat 18      [12-31-22 @ 06:41]  Ferritin 346      [12-31-22 @ 06:42]  TSH 2.42      [01-15-23 @ 06:13]  Lipid: chol --, , HDL --, LDL --      [12-05-22 @ 00:50]    HBsAg Nonreact      [01-04-23 @ 10:02]     [facility] DIVISION OF KIDNEY DISEASES AND HYPERTENSION -- PROGRESS NOTE    Chief complaint: ERIC    24 hour events/subjective: had HD yesterday        PAST HISTORY  --------------------------------------------------------------------------------  No significant changes to PMH, PSH, FHx, SHx, unless otherwise noted    ALLERGIES & MEDICATIONS  --------------------------------------------------------------------------------  Allergies    No Known Allergies    Intolerances      Standing Inpatient Medications  aspirin  chewable 81 milliGRAM(s) Oral daily  atorvastatin 80 milliGRAM(s) Oral at bedtime  benzonatate 100 milliGRAM(s) Oral every 8 hours  caspofungin IVPB 50 milliGRAM(s) IV Intermittent every 24 hours  chlorhexidine 2% Cloths 1 Application(s) Topical <User Schedule>  chlorhexidine 4% Liquid 1 Application(s) Topical <User Schedule>  dextrose 50% Injectable 25 Gram(s) IV Push once  dextrose 50% Injectable 12.5 Gram(s) IV Push once  dextrose 50% Injectable 25 Gram(s) IV Push once  dextrose Oral Gel 15 Gram(s) Oral once  epoetin teena-epbx (RETACRIT) Injectable 5000 Unit(s) IV Push <User Schedule>  glucagon  Injectable 1 milliGRAM(s) IntraMuscular once  hemorrhoidal Ointment 1 Application(s) Rectal two times a day  heparin   Injectable 5000 Unit(s) SubCutaneous every 8 hours  hydrocortisone hemorrhoidal Suppository 1 Suppository(s) Rectal daily  insulin glargine Injectable (LANTUS) 20 Unit(s) SubCutaneous at bedtime  insulin lispro (ADMELOG) corrective regimen sliding scale   SubCutaneous three times a day before meals  insulin lispro (ADMELOG) corrective regimen sliding scale   SubCutaneous at bedtime  insulin lispro Injectable (ADMELOG) 8 Unit(s) SubCutaneous three times a day with meals  metoprolol tartrate 25 milliGRAM(s) Oral two times a day  midodrine 15 milliGRAM(s) Oral every 8 hours  multivitamin/minerals/iron Oral Solution (CENTRUM) 15 milliLiter(s) Oral daily  pantoprazole    Tablet 40 milliGRAM(s) Oral before breakfast  polyethylene glycol 3350 17 Gram(s) Oral daily  potassium chloride    Tablet ER 20 milliEquivalent(s) Oral once  senna 2 Tablet(s) Oral at bedtime  sevelamer carbonate Powder 1600 milliGRAM(s) Oral three times a day with meals  thiamine 100 milliGRAM(s) Enteral Tube daily  witch hazel Pads 1 Application(s) Topical two times a day    PRN Inpatient Medications  acetaminophen    Suspension .. 650 milliGRAM(s) Oral every 6 hours PRN  guaiFENesin Oral Liquid (Sugar-Free) 200 milliGRAM(s) Oral every 6 hours PRN  simethicone 80 milliGRAM(s) Chew daily PRN  sodium chloride 0.9% lock flush 10 milliLiter(s) IV Push every 1 hour PRN      REVIEW OF SYSTEMS  --------------------------------------------------------------------------------  Constitutional: [ ] Fever [ ] Chills [ ] Fatigue [ ] Weight change   HEENT: [ ] Blurred vision [ ] Eye Pain [ ] Headache [ ] Runny nose [ ] Sore Throat   Respiratory: [ ] Cough [ ] Wheezing [ ] Shortness of breath  Cardiovascular: [ ] Chest Pain [ ] Palpitations [ ] BRUNSON [ ] PND [ ] Orthopnea  Gastrointestinal: [ ] Abdominal Pain [ ] Diarrhea [ ] Constipation [ ] Hemorrhoids [ ] Nausea [ ] Vomiting  Genitourinary: [ ] Nocturia [ ] Dysuria [ ] Incontinence  Extremities: [ ] Swelling [ ] Joint Pain  Neurologic: [ ] Focal deficit [ ] Paresthesias [ ] Syncope  Lymphatic: [ ] Swelling [ ] Lymphadenopathy   Skin: [ ] Rash [ ] Ecchymoses [ ] Wounds [ ] Lesions  Psychiatry: [ ] Depression [ ] Suicidal/Homicidal Ideation [ ] Anxiety [ ] Sleep Disturbances  [x ] 10 point review of systems is otherwise negative except as mentioned above              [ ]Unable to obtain due to   All other systems were reviewed and are negative, except as noted.    VITALS/PHYSICAL EXAM  --------------------------------------------------------------------------------  T(C): 36.9 (01-19-23 @ 10:43), Max: 36.9 (01-19-23 @ 10:43)  HR: 76 (01-19-23 @ 10:43) (76 - 85)  BP: 96/54 (01-19-23 @ 10:43) (96/54 - 112/66)  RR: 18 (01-19-23 @ 10:43) (18 - 18)  SpO2: 95% (01-19-23 @ 10:43) (94% - 100%)  Wt(kg): --        01-18-23 @ 07:01  -  01-19-23 @ 07:00  --------------------------------------------------------  IN: 2050 mL / OUT: 2875 mL / NET: -825 mL    01-19-23 @ 07:01  -  01-19-23 @ 12:29  --------------------------------------------------------  IN: 300 mL / OUT: 0 mL / NET: 300 mL      Physical Exam:  	Gen: NAD, well-appearing  	HEENT: on room air  	Pulm: CTA B/L  	CV: normal S1S2; no rub  	Abd: soft                      Back : No sacral edema  	: No michel  	LE: no edema  	Skin: Warm, without rashes  	Vascular access: IJ tunneled dialysis catheter in situ    LABS/STUDIES  --------------------------------------------------------------------------------              9.1    10.68 >-----------<  169      [01-19-23 @ 09:53]              30.3     134  |  95  |  55  ----------------------------<  124      [01-19-23 @ 09:52]  3.1   |  23  |  3.16        Ca     8.7     [01-19-23 @ 09:52]      Mg     2.4     [01-19-23 @ 09:52]      Phos  3.2     [01-19-23 @ 09:52]    TPro  7.1  /  Alb  3.3  /  TBili  0.4  /  DBili  x   /  AST  17  /  ALT  8   /  AlkPhos  77  [01-19-23 @ 09:52]          Creatinine Trend:  SCr 3.16 [01-19 @ 09:52]  SCr 4.60 [01-18 @ 06:36]  SCr 4.31 [01-17 @ 05:53]  SCr 5.90 [01-16 @ 07:08]  SCr 5.05 [01-15 @ 06:13]    Urinalysis - [12-26-22 @ 13:19]      Color Yellow / Appearance Clear / SG 1.013 / pH 6.0      Gluc Negative / Ketone Negative  / Bili Negative / Urobili Negative       Blood Large / Protein 100 mg/dL / Leuk Est Moderate / Nitrite Negative      RBC >50 / WBC 35 / Hyaline 1 / Gran  / Sq Epi  / Non Sq Epi 2 / Bacteria Few      Iron 39, TIBC 222, %sat 18      [12-31-22 @ 06:41]  Ferritin 346      [12-31-22 @ 06:42]  TSH 2.42      [01-15-23 @ 06:13]  Lipid: chol --, , HDL --, LDL --      [12-05-22 @ 00:50]    HBsAg Nonreact      [01-04-23 @ 10:02]

## 2023-01-19 NOTE — PROGRESS NOTE ADULT - ASSESSMENT
Nutrition Care Plan    Nutrition Diagnosis:   Inadequate intake related to chemo induced nausea as evidenced by patient using oral nutrition supplements at home to maintain adequate nutrition.    Intervention:  General/healthful diet: Cardiac Diet patient encouarged to try and order three meals daily according to food preferences.   Specific foods/beverages or groups: provide Ensure Enlive with meals which provides 1050 kcal and 60 gm protein.     Monitoring and Evaluation:  Liquid meal replacement or supplement: goal to consume >75% of three times daily oral nutrition supplements.  Amount of food: goal to consume >50% of most meals.            Pt with dialysis dependent ERIC

## 2023-01-19 NOTE — PROGRESS NOTE ADULT - SUBJECTIVE AND OBJECTIVE BOX
VITAL SIGNS    Telemetry:  nsr 70-80    Vital Signs Last 24 Hrs  T(C): 36.9 (23 @ 10:43), Max: 36.9 (23 @ 10:43)  T(F): 98.4 (23 @ 10:43), Max: 98.4 (23 @ 10:43)  HR: 76 (23 @ 10:43) (76 - 85)  BP: 96/54 (23 @ 10:43) (96/54 - 112/56)  RR: 18 (23 @ 10:43) (18 - 18)  SpO2: 95% (23 @ 10:43) (94% - 100%)                    @ 07:  -   @ 07:00  --------------------------------------------------------  IN: 2050 mL / OUT: 2875 mL / NET: -825 mL     @ 07:  -   @ 13:14  --------------------------------------------------------  IN: 540 mL / OUT: 0 mL / NET: 540 mL          Daily     Daily Weight in k.6 (2023 06:35)            CAPILLARY BLOOD GLUCOSE      POCT Blood Glucose.: 134 mg/dL (2023 11:11)  POCT Blood Glucose.: 122 mg/dL (2023 07:16)  POCT Blood Glucose.: 125 mg/dL (2023 20:45)  POCT Blood Glucose.: 142 mg/dL (2023 16:41)            Drains:         Pacing Wires          Coumadin    [ ] YES          [ x ]      NO                                   PHYSICAL EXAM        Neurology: alert and oriented x 3, nonfocal, no gross deficits  CV : s1 s2 RRR  Sternal Wound : healed  Lungs: cta  Abdomen: soft, nontender, nondistended, positive bowel sounds, last bowel movement +                      :    voiding    Extremities:      -edema   /  -   calve tenderness ,           acetaminophen    Suspension .. 650 milliGRAM(s) Oral every 6 hours PRN  aspirin  chewable 81 milliGRAM(s) Oral daily  atorvastatin 80 milliGRAM(s) Oral at bedtime  benzonatate 100 milliGRAM(s) Oral every 8 hours  caspofungin IVPB 50 milliGRAM(s) IV Intermittent every 24 hours  chlorhexidine 2% Cloths 1 Application(s) Topical <User Schedule>  chlorhexidine 4% Liquid 1 Application(s) Topical <User Schedule>  dextrose 50% Injectable 25 Gram(s) IV Push once  dextrose 50% Injectable 12.5 Gram(s) IV Push once  dextrose 50% Injectable 25 Gram(s) IV Push once  dextrose Oral Gel 15 Gram(s) Oral once  epoetin teena-epbx (RETACRIT) Injectable 5000 Unit(s) IV Push <User Schedule>  glucagon  Injectable 1 milliGRAM(s) IntraMuscular once  guaiFENesin Oral Liquid (Sugar-Free) 200 milliGRAM(s) Oral every 6 hours PRN  hemorrhoidal Ointment 1 Application(s) Rectal two times a day  heparin   Injectable 5000 Unit(s) SubCutaneous every 8 hours  hydrocortisone hemorrhoidal Suppository 1 Suppository(s) Rectal daily  insulin glargine Injectable (LANTUS) 20 Unit(s) SubCutaneous at bedtime  insulin lispro (ADMELOG) corrective regimen sliding scale   SubCutaneous three times a day before meals  insulin lispro (ADMELOG) corrective regimen sliding scale   SubCutaneous at bedtime  insulin lispro Injectable (ADMELOG) 8 Unit(s) SubCutaneous three times a day with meals  metoprolol tartrate 25 milliGRAM(s) Oral two times a day  midodrine 15 milliGRAM(s) Oral every 8 hours  multivitamin/minerals/iron Oral Solution (CENTRUM) 15 milliLiter(s) Oral daily  pantoprazole    Tablet 40 milliGRAM(s) Oral before breakfast  polyethylene glycol 3350 17 Gram(s) Oral daily  potassium chloride    Tablet ER 20 milliEquivalent(s) Oral once  senna 2 Tablet(s) Oral at bedtime  sevelamer carbonate 800 milliGRAM(s) Oral three times a day with meals  simethicone 80 milliGRAM(s) Chew daily PRN  sodium chloride 0.9% lock flush 10 milliLiter(s) IV Push every 1 hour PRN  thiamine 100 milliGRAM(s) Enteral Tube daily  witch hazel Pads 1 Application(s) Topical two times a day                    Physical Therapy Rec:   Home  [  ]   Home w/ PT  [  ]  Rehab  [  ]  Discussed with Cardiothoracic Team at AM rounds.

## 2023-01-20 LAB
ANION GAP SERPL CALC-SCNC: 12 MMOL/L — SIGNIFICANT CHANGE UP (ref 5–17)
APTT BLD: 26.5 SEC — LOW (ref 27.5–35.5)
BUN SERPL-MCNC: 32 MG/DL — HIGH (ref 7–23)
CALCIUM SERPL-MCNC: 8.9 MG/DL — SIGNIFICANT CHANGE UP (ref 8.4–10.5)
CHLORIDE SERPL-SCNC: 97 MMOL/L — SIGNIFICANT CHANGE UP (ref 96–108)
CO2 SERPL-SCNC: 24 MMOL/L — SIGNIFICANT CHANGE UP (ref 22–31)
CREAT SERPL-MCNC: 2 MG/DL — HIGH (ref 0.5–1.3)
EGFR: 37 ML/MIN/1.73M2 — LOW
GLUCOSE BLDC GLUCOMTR-MCNC: 108 MG/DL — HIGH (ref 70–99)
GLUCOSE BLDC GLUCOMTR-MCNC: 121 MG/DL — HIGH (ref 70–99)
GLUCOSE BLDC GLUCOMTR-MCNC: 130 MG/DL — HIGH (ref 70–99)
GLUCOSE BLDC GLUCOMTR-MCNC: 90 MG/DL — SIGNIFICANT CHANGE UP (ref 70–99)
GLUCOSE BLDC GLUCOMTR-MCNC: 91 MG/DL — SIGNIFICANT CHANGE UP (ref 70–99)
GLUCOSE BLDC GLUCOMTR-MCNC: 98 MG/DL — SIGNIFICANT CHANGE UP (ref 70–99)
GLUCOSE SERPL-MCNC: 124 MG/DL — HIGH (ref 70–99)
HCT VFR BLD CALC: 30.7 % — LOW (ref 39–50)
HGB BLD-MCNC: 9.6 G/DL — LOW (ref 13–17)
INR BLD: 1.03 RATIO — SIGNIFICANT CHANGE UP (ref 0.88–1.16)
MAGNESIUM SERPL-MCNC: 2.2 MG/DL — SIGNIFICANT CHANGE UP (ref 1.6–2.6)
MCHC RBC-ENTMCNC: 30.8 PG — SIGNIFICANT CHANGE UP (ref 27–34)
MCHC RBC-ENTMCNC: 31.3 GM/DL — LOW (ref 32–36)
MCV RBC AUTO: 98.4 FL — SIGNIFICANT CHANGE UP (ref 80–100)
NRBC # BLD: 0 /100 WBCS — SIGNIFICANT CHANGE UP (ref 0–0)
PLATELET # BLD AUTO: 133 K/UL — LOW (ref 150–400)
POTASSIUM SERPL-MCNC: 3.7 MMOL/L — SIGNIFICANT CHANGE UP (ref 3.5–5.3)
POTASSIUM SERPL-SCNC: 3.7 MMOL/L — SIGNIFICANT CHANGE UP (ref 3.5–5.3)
PROTHROM AB SERPL-ACNC: 12 SEC — SIGNIFICANT CHANGE UP (ref 10.5–13.4)
RBC # BLD: 3.12 M/UL — LOW (ref 4.2–5.8)
RBC # FLD: 18.8 % — HIGH (ref 10.3–14.5)
SODIUM SERPL-SCNC: 133 MMOL/L — LOW (ref 135–145)
WBC # BLD: 11.77 K/UL — HIGH (ref 3.8–10.5)
WBC # FLD AUTO: 11.77 K/UL — HIGH (ref 3.8–10.5)

## 2023-01-20 PROCEDURE — 99232 SBSQ HOSP IP/OBS MODERATE 35: CPT | Mod: 24

## 2023-01-20 DEVICE — SURGICEL POWDER 3 GRAMS: Type: IMPLANTABLE DEVICE | Site: RIGHT | Status: FUNCTIONAL

## 2023-01-20 DEVICE — CLIP APPLIER ETHICON LIGACLIP 9 3/8" SMALL: Type: IMPLANTABLE DEVICE | Site: RIGHT | Status: FUNCTIONAL

## 2023-01-20 RX ORDER — ERYTHROPOIETIN 10000 [IU]/ML
5000 INJECTION, SOLUTION INTRAVENOUS; SUBCUTANEOUS
Refills: 0 | Status: DISCONTINUED | OUTPATIENT
Start: 2023-01-20 | End: 2023-01-25

## 2023-01-20 RX ORDER — OXYCODONE HYDROCHLORIDE 5 MG/1
5 TABLET ORAL ONCE
Refills: 0 | Status: DISCONTINUED | OUTPATIENT
Start: 2023-01-20 | End: 2023-01-21

## 2023-01-20 RX ORDER — GLUCAGON INJECTION, SOLUTION 0.5 MG/.1ML
1 INJECTION, SOLUTION SUBCUTANEOUS ONCE
Refills: 0 | Status: DISCONTINUED | OUTPATIENT
Start: 2023-01-20 | End: 2023-01-25

## 2023-01-20 RX ORDER — ACETAMINOPHEN 500 MG
650 TABLET ORAL EVERY 6 HOURS
Refills: 0 | Status: DISCONTINUED | OUTPATIENT
Start: 2023-01-20 | End: 2023-01-20

## 2023-01-20 RX ORDER — CHLORHEXIDINE GLUCONATE 213 G/1000ML
1 SOLUTION TOPICAL
Refills: 0 | Status: DISCONTINUED | OUTPATIENT
Start: 2023-01-20 | End: 2023-01-25

## 2023-01-20 RX ORDER — ASPIRIN/CALCIUM CARB/MAGNESIUM 324 MG
81 TABLET ORAL DAILY
Refills: 0 | Status: DISCONTINUED | OUTPATIENT
Start: 2023-01-20 | End: 2023-01-25

## 2023-01-20 RX ORDER — PSYLLIUM SEED (WITH DEXTROSE)
1 POWDER (GRAM) ORAL DAILY
Refills: 0 | Status: DISCONTINUED | OUTPATIENT
Start: 2023-01-20 | End: 2023-01-20

## 2023-01-20 RX ORDER — METOPROLOL TARTRATE 50 MG
25 TABLET ORAL
Refills: 0 | Status: DISCONTINUED | OUTPATIENT
Start: 2023-01-20 | End: 2023-01-23

## 2023-01-20 RX ORDER — DEXTROSE 50 % IN WATER 50 %
12.5 SYRINGE (ML) INTRAVENOUS ONCE
Refills: 0 | Status: DISCONTINUED | OUTPATIENT
Start: 2023-01-20 | End: 2023-01-25

## 2023-01-20 RX ORDER — POLYETHYLENE GLYCOL 3350 17 G/17G
17 POWDER, FOR SOLUTION ORAL ONCE
Refills: 0 | Status: DISCONTINUED | OUTPATIENT
Start: 2023-01-20 | End: 2023-01-25

## 2023-01-20 RX ORDER — ATORVASTATIN CALCIUM 80 MG/1
80 TABLET, FILM COATED ORAL AT BEDTIME
Refills: 0 | Status: DISCONTINUED | OUTPATIENT
Start: 2023-01-20 | End: 2023-01-25

## 2023-01-20 RX ORDER — CASPOFUNGIN ACETATE 7 MG/ML
50 INJECTION, POWDER, LYOPHILIZED, FOR SOLUTION INTRAVENOUS EVERY 24 HOURS
Refills: 0 | Status: DISCONTINUED | OUTPATIENT
Start: 2023-01-20 | End: 2023-01-25

## 2023-01-20 RX ORDER — THIAMINE MONONITRATE (VIT B1) 100 MG
100 TABLET ORAL DAILY
Refills: 0 | Status: DISCONTINUED | OUTPATIENT
Start: 2023-01-20 | End: 2023-01-20

## 2023-01-20 RX ORDER — MIDODRINE HYDROCHLORIDE 2.5 MG/1
15 TABLET ORAL EVERY 8 HOURS
Refills: 0 | Status: DISCONTINUED | OUTPATIENT
Start: 2023-01-20 | End: 2023-01-21

## 2023-01-20 RX ORDER — SEVELAMER CARBONATE 2400 MG/1
800 POWDER, FOR SUSPENSION ORAL
Refills: 0 | Status: DISCONTINUED | OUTPATIENT
Start: 2023-01-20 | End: 2023-01-25

## 2023-01-20 RX ORDER — DEXTROSE 50 % IN WATER 50 %
25 SYRINGE (ML) INTRAVENOUS ONCE
Refills: 0 | Status: DISCONTINUED | OUTPATIENT
Start: 2023-01-20 | End: 2023-01-25

## 2023-01-20 RX ORDER — DEXTROSE 50 % IN WATER 50 %
15 SYRINGE (ML) INTRAVENOUS ONCE
Refills: 0 | Status: DISCONTINUED | OUTPATIENT
Start: 2023-01-20 | End: 2023-01-25

## 2023-01-20 RX ORDER — INSULIN LISPRO 100/ML
VIAL (ML) SUBCUTANEOUS
Refills: 0 | Status: DISCONTINUED | OUTPATIENT
Start: 2023-01-20 | End: 2023-01-25

## 2023-01-20 RX ORDER — HYDROCORTISONE 1 %
1 OINTMENT (GRAM) TOPICAL THREE TIMES A DAY
Refills: 0 | Status: DISCONTINUED | OUTPATIENT
Start: 2023-01-20 | End: 2023-01-25

## 2023-01-20 RX ORDER — POLYETHYLENE GLYCOL 3350 17 G/17G
17 POWDER, FOR SOLUTION ORAL ONCE
Refills: 0 | Status: DISCONTINUED | OUTPATIENT
Start: 2023-01-20 | End: 2023-01-20

## 2023-01-20 RX ORDER — INSULIN GLARGINE 100 [IU]/ML
20 INJECTION, SOLUTION SUBCUTANEOUS AT BEDTIME
Refills: 0 | Status: DISCONTINUED | OUTPATIENT
Start: 2023-01-20 | End: 2023-01-22

## 2023-01-20 RX ORDER — POLYETHYLENE GLYCOL 3350 17 G/17G
17 POWDER, FOR SOLUTION ORAL DAILY
Refills: 0 | Status: DISCONTINUED | OUTPATIENT
Start: 2023-01-20 | End: 2023-01-25

## 2023-01-20 RX ORDER — PANTOPRAZOLE SODIUM 20 MG/1
40 TABLET, DELAYED RELEASE ORAL
Refills: 0 | Status: DISCONTINUED | OUTPATIENT
Start: 2023-01-20 | End: 2023-01-25

## 2023-01-20 RX ORDER — OXYCODONE HYDROCHLORIDE 5 MG/1
10 TABLET ORAL ONCE
Refills: 0 | Status: DISCONTINUED | OUTPATIENT
Start: 2023-01-20 | End: 2023-01-21

## 2023-01-20 RX ORDER — ACETAMINOPHEN 500 MG
1000 TABLET ORAL ONCE
Refills: 0 | Status: DISCONTINUED | OUTPATIENT
Start: 2023-01-20 | End: 2023-01-21

## 2023-01-20 RX ORDER — MULTIVIT-MIN/FERROUS GLUCONATE 9 MG/15 ML
15 LIQUID (ML) ORAL DAILY
Refills: 0 | Status: DISCONTINUED | OUTPATIENT
Start: 2023-01-20 | End: 2023-01-25

## 2023-01-20 RX ORDER — INSULIN LISPRO 100/ML
VIAL (ML) SUBCUTANEOUS AT BEDTIME
Refills: 0 | Status: DISCONTINUED | OUTPATIENT
Start: 2023-01-20 | End: 2023-01-25

## 2023-01-20 RX ORDER — FENTANYL CITRATE 50 UG/ML
50 INJECTION INTRAVENOUS
Refills: 0 | Status: DISCONTINUED | OUTPATIENT
Start: 2023-01-20 | End: 2023-01-21

## 2023-01-20 RX ORDER — HYDROCORTISONE 1 %
1 OINTMENT (GRAM) TOPICAL DAILY
Refills: 0 | Status: DISCONTINUED | OUTPATIENT
Start: 2023-01-20 | End: 2023-01-25

## 2023-01-20 RX ORDER — PSYLLIUM SEED (WITH DEXTROSE)
1 POWDER (GRAM) ORAL DAILY
Refills: 0 | Status: DISCONTINUED | OUTPATIENT
Start: 2023-01-20 | End: 2023-01-25

## 2023-01-20 RX ORDER — SIMETHICONE 80 MG/1
80 TABLET, CHEWABLE ORAL DAILY
Refills: 0 | Status: DISCONTINUED | OUTPATIENT
Start: 2023-01-20 | End: 2023-01-25

## 2023-01-20 RX ORDER — THIAMINE MONONITRATE (VIT B1) 100 MG
100 TABLET ORAL DAILY
Refills: 0 | Status: DISCONTINUED | OUTPATIENT
Start: 2023-01-20 | End: 2023-01-25

## 2023-01-20 RX ORDER — ONDANSETRON 8 MG/1
4 TABLET, FILM COATED ORAL ONCE
Refills: 0 | Status: DISCONTINUED | OUTPATIENT
Start: 2023-01-20 | End: 2023-01-21

## 2023-01-20 RX ORDER — INSULIN LISPRO 100/ML
8 VIAL (ML) SUBCUTANEOUS
Refills: 0 | Status: DISCONTINUED | OUTPATIENT
Start: 2023-01-20 | End: 2023-01-22

## 2023-01-20 RX ORDER — SENNA PLUS 8.6 MG/1
2 TABLET ORAL AT BEDTIME
Refills: 0 | Status: DISCONTINUED | OUTPATIENT
Start: 2023-01-20 | End: 2023-01-25

## 2023-01-20 RX ADMIN — MIDODRINE HYDROCHLORIDE 15 MILLIGRAM(S): 2.5 TABLET ORAL at 06:00

## 2023-01-20 RX ADMIN — CASPOFUNGIN ACETATE 260 MILLIGRAM(S): 7 INJECTION, POWDER, LYOPHILIZED, FOR SOLUTION INTRAVENOUS at 11:55

## 2023-01-20 RX ADMIN — Medication 1 APPLICATION(S): at 04:35

## 2023-01-20 RX ADMIN — CHLORHEXIDINE GLUCONATE 1 APPLICATION(S): 213 SOLUTION TOPICAL at 06:19

## 2023-01-20 RX ADMIN — Medication 1 APPLICATION(S): at 15:08

## 2023-01-20 RX ADMIN — Medication 100 MILLIGRAM(S): at 05:58

## 2023-01-20 RX ADMIN — Medication 1 APPLICATION(S): at 21:22

## 2023-01-20 RX ADMIN — Medication 650 MILLIGRAM(S): at 01:30

## 2023-01-20 RX ADMIN — Medication 1 SUPPOSITORY(S): at 10:35

## 2023-01-20 RX ADMIN — Medication 1 APPLICATION(S): at 00:29

## 2023-01-20 RX ADMIN — Medication 650 MILLIGRAM(S): at 00:39

## 2023-01-20 RX ADMIN — MIDODRINE HYDROCHLORIDE 15 MILLIGRAM(S): 2.5 TABLET ORAL at 21:37

## 2023-01-20 RX ADMIN — Medication 25 MILLIGRAM(S): at 05:59

## 2023-01-20 RX ADMIN — INSULIN GLARGINE 20 UNIT(S): 100 INJECTION, SOLUTION SUBCUTANEOUS at 22:59

## 2023-01-20 RX ADMIN — ATORVASTATIN CALCIUM 80 MILLIGRAM(S): 80 TABLET, FILM COATED ORAL at 21:25

## 2023-01-20 RX ADMIN — SIMETHICONE 80 MILLIGRAM(S): 80 TABLET, CHEWABLE ORAL at 06:27

## 2023-01-20 RX ADMIN — Medication 81 MILLIGRAM(S): at 15:08

## 2023-01-20 RX ADMIN — MIDODRINE HYDROCHLORIDE 15 MILLIGRAM(S): 2.5 TABLET ORAL at 15:12

## 2023-01-20 RX ADMIN — PANTOPRAZOLE SODIUM 40 MILLIGRAM(S): 20 TABLET, DELAYED RELEASE ORAL at 06:19

## 2023-01-20 RX ADMIN — Medication 10 MILLIGRAM(S): at 10:35

## 2023-01-20 RX ADMIN — SENNA PLUS 2 TABLET(S): 8.6 TABLET ORAL at 21:22

## 2023-01-20 NOTE — PRE-ANESTHESIA EVALUATION ADULT - NSANTHAIRWAYFT_ENT_ALL_CORE
ETT in situ
Neck FROM, TMD > 3 FB
ETT in place
Intubated, LIJ triple lumen in situ, on IABP and VA ECMO

## 2023-01-20 NOTE — PRE-OP CHECKLIST - NOTHING BY MOUTH SINCE
Refill Protocol Appointment Criteria  · Appointment scheduled in the past 12 months or in the next 3 months  Recent Outpatient Visits            1 month ago Nondisplaced fracture of fourth metatarsal bone, right foot, initial encounter for closed fracture
13-Dec-2022 13:00
08-Dec-2022 00:00
10-Teo-2023 23:59
19-Jan-2023 23:59
20-Jan-2023 00:00

## 2023-01-20 NOTE — PROGRESS NOTE ADULT - PROBLEM SELECTOR PLAN 3
- was on CVVH now on iHD   - appreciate nephrology recommendations   - avoid nephrotoxins  - s/p RIJ permacath 1/11  - [facility] sx called for AV fistula placement on this admission (vein mapping completed   await date of AVF from [facility] sx--> pt cleared as per cardiac surgeon Dr. Rodrigues- pt must have AVF with cardiac anesthesia-discussed with [facility] sx fellow Rajeev Ascencio - was on CVVH now on iHD   - appreciate nephrology recommendations   - avoid nephrotoxins  - s/p RIJ permacath 1/11  - [facility] sx called for AV fistula placement on this admission (vein mapping completed   await date of AVF from [facility] sx--> pt cleared as per cardiac surgeon Dr. Rodrigues- pt must have AVF with cardiac anesthesia-discussed with [facility] sx fellow Rajeev Ascencio - was on CVVH now on iHD   - appreciate nephrology recommendations   - avoid nephrotoxins  - s/p RIJ permacath 1/11  - [facility] sx called for AV fistula placement on this admission (vein mapping completed   await date of AVF from [facility] sx--> pt cleared as per cardiac surgeon Dr. Rodrigues- pt must have AVF with cardiac anesthesia-discussed with [facility] sx fellow Rajeev Ascencio

## 2023-01-20 NOTE — PROGRESS NOTE ADULT - ASSESSMENT
62M w/HTN, DM2, CAD s/p CABG found to have acute gallstone pancreatitis; developed hypoxic respiratory failure requiring intubation later leading to cardiac arrest requiring VA ECMO, now weaned off. Now s/p trach but off vent. Hospital course c/b fungemia and ATN now requiring intermittent HD, now s/p R permacath placement by IR. Patient currently downgraded to the floor. Primary team requesting inpatient LUE AVF. Vein mapping completed 12/22/22. OR tomorrow for right AVF.    Plan:  - OR for avf today  - last HD yesterday  - Keep RUE protected (no lines, IVs, blood draws, etc.)  - c/w ASA, SQ heparin  - cardiology and CTS clearance documented   - will follow    Vascular Surgery  [id number] 62M w/HTN, DM2, CAD s/p CABG found to have acute gallstone pancreatitis; developed hypoxic respiratory failure requiring intubation later leading to cardiac arrest requiring VA ECMO, now weaned off. Now s/p trach but off vent. Hospital course c/b fungemia and ATN now requiring intermittent HD, now s/p R permacath placement by IR. Patient currently downgraded to the floor. Primary team requesting inpatient LUE AVF. Vein mapping completed 12/22/22. OR tomorrow for right AVF.    Plan:  - OR for avf today  - last HD yesterday  - Keep RUE protected (no lines, IVs, blood draws, etc.)  - c/w ASA, SQ heparin  - cardiology and CTS clearance documented   - will follow    Vascular Surgery  [id number] 62M w/HTN, DM2, CAD s/p CABG found to have acute gallstone pancreatitis; developed hypoxic respiratory failure requiring intubation later leading to cardiac arrest requiring VA ECMO, now weaned off. Now s/p trach but off vent. Hospital course c/b fungemia and ATN now requiring intermittent HD, now s/p R permacath placement by IR. Patient currently downgraded to the floor. Primary team requesting inpatient LUE AVF. Vein mapping completed 12/22/22. OR tomorrow for right AVF.    Plan:  - OR for avf today  - last HD yesterday  - Keep RUE protected (no lines, IVs, blood draws, etc.)  - c/w ASA, SQ heparin  - cardiology and CTS clearance documented   - will follow    Vascular Surgery  [id number]

## 2023-01-20 NOTE — PROGRESS NOTE ADULT - SUBJECTIVE AND OBJECTIVE BOX
Surgery Progress Note    INTERVAl/SUBJECTIVE: No acute event overnight. Patient seen and examined in am rounds, found to be without acute distress.      Vital Signs Last 24 Hrs  T(C): 36.6 (20 Jan 2023 03:42), Max: 36.9 (19 Jan 2023 10:43)  T(F): 97.8 (20 Jan 2023 03:42), Max: 98.4 (19 Jan 2023 10:43)  HR: 78 (20 Jan 2023 03:42) (76 - 88)  BP: 105/57 (20 Jan 2023 03:42) (96/54 - 109/56)  BP(mean): 76 (20 Jan 2023 03:15) (71 - 78)  RR: 18 (20 Jan 2023 03:42) (18 - 18)  SpO2: 95% (20 Jan 2023 03:42) (95% - 100%)    Parameters below as of 20 Jan 2023 03:15  Patient On (Oxygen Delivery Method): room air        Physical Exam:  General: Appears well, NAD  CHEST: breathing comfortably  CV: appears well perfused  Abdomen: soft, nontender, nondistended, no rebound or guarding  Extremities: Grossly symmetric, R radial pulse strong    LABS:                        9.6    11.77 )-----------( 133      ( 20 Jan 2023 00:49 )             30.7     01-20    133<L>  |  97  |  32<H>  ----------------------------<  124<H>  3.7   |  24  |  2.00<H>    Ca    8.9      20 Jan 2023 00:49  Phos  3.2     01-19  Mg     2.2     01-20    TPro  7.1  /  Alb  3.3  /  TBili  0.4  /  DBili  x   /  AST  17  /  ALT  8<L>  /  AlkPhos  77  01-19    PT/INR - ( 20 Jan 2023 00:49 )   PT: 12.0 sec;   INR: 1.03 ratio         PTT - ( 20 Jan 2023 00:49 )  PTT:26.5 sec      INs and OUTs:    01-19-23 @ 07:01  -  01-20-23 @ 07:00  --------------------------------------------------------  IN: 1680 mL / OUT: 2000 mL / NET: -320 mL

## 2023-01-20 NOTE — BRIEF OPERATIVE NOTE - NSICDXBRIEFPREOP_GEN_ALL_CORE_FT
PRE-OP DIAGNOSIS:  ESRD on dialysis 20-Jan-2023 19:56:30  Ding, Ding  
PRE-OP DIAGNOSIS:  Cardiac arrest 26-Nov-2022 06:49:49  Stef Diaz  
PRE-OP DIAGNOSIS:  Personal history of ECMO 08-Dec-2022 11:00:12  Genoveva Lundberg

## 2023-01-20 NOTE — CHART NOTE - NSCHARTNOTEFT_GEN_A_CORE
SURGERY POST-OPERATIVE PROCEDURE NOTE    PROCEDURE: right upper extremity brachiocephalic arteriovenous fistula     SUBJECTIVE:  Patient seen and evaluated at the bedside, resting comfortably. Endorsing soreness around his incisions.       OBJECTIVE:   Vital Signs Last 24 Hrs  T(C): 36.2 (20 Jan 2023 21:30), Max: 36.6 (20 Jan 2023 03:15)  T(F): 97.2 (20 Jan 2023 21:30), Max: 97.8 (20 Jan 2023 03:15)  HR: 89 (20 Jan 2023 23:15) (76 - 89)  BP: 101/58 (20 Jan 2023 23:15) (97/53 - 118/62)  BP(mean): 73 (20 Jan 2023 23:15) (72 - 84)  RR: 15 (20 Jan 2023 23:15) (14 - 18)  SpO2: 100% (20 Jan 2023 23:15) (95% - 100%)    Parameters below as of 20 Jan 2023 23:15  Patient On (Oxygen Delivery Method): room air      I&O's Summary    19 Jan 2023 07:01  -  20 Jan 2023 07:00  --------------------------------------------------------  IN: 1680 mL / OUT: 2000 mL / NET: -320 mL    20 Jan 2023 07:01  -  20 Jan 2023 23:30  --------------------------------------------------------  IN: 120 mL / OUT: 0 mL / NET: 120 mL      I&O's Detail    19 Jan 2023 07:01  -  20 Jan 2023 07:00  --------------------------------------------------------  IN:    Oral Fluid: 880 mL    Other (mL): 800 mL  Total IN: 1680 mL    OUT:    Other (mL): 1800 mL    Voided (mL): 200 mL  Total OUT: 2000 mL    Total NET: -320 mL      20 Jan 2023 07:01  -  20 Jan 2023 23:30  --------------------------------------------------------  IN:    Oral Fluid: 120 mL  Total IN: 120 mL    OUT:  Total OUT: 0 mL    Total NET: 120 mL            Labs:                        9.6    11.77 )-----------( 133      ( 20 Jan 2023 00:49 )             30.7     01-20    133<L>  |  97  |  32<H>  ----------------------------<  124<H>  3.7   |  24  |  2.00<H>    Ca    8.9      20 Jan 2023 00:49  Phos  3.2     01-19  Mg     2.2     01-20    TPro  7.1  /  Alb  3.3  /  TBili  0.4  /  DBili  x   /  AST  17  /  ALT  8<L>  /  AlkPhos  77  01-19    PT/INR - ( 20 Jan 2023 00:49 )   PT: 12.0 sec;   INR: 1.03 ratio         PTT - ( 20 Jan 2023 00:49 )  PTT:26.5 sec      MEDICATIONS  (STANDING):  acetaminophen   IVPB .. 1000 milliGRAM(s) IV Intermittent once  aspirin  chewable 81 milliGRAM(s) Oral daily  atorvastatin 80 milliGRAM(s) Oral at bedtime  benzonatate 100 milliGRAM(s) Oral every 8 hours  caspofungin IVPB 50 milliGRAM(s) IV Intermittent every 24 hours  chlorhexidine 2% Cloths 1 Application(s) Topical <User Schedule>  chlorhexidine 4% Liquid 1 Application(s) Topical <User Schedule>  dextrose 50% Injectable 25 Gram(s) IV Push once  dextrose 50% Injectable 12.5 Gram(s) IV Push once  dextrose 50% Injectable 25 Gram(s) IV Push once  dextrose Oral Gel 15 Gram(s) Oral once  epoetin teena-epbx (RETACRIT) Injectable 5000 Unit(s) IV Push <User Schedule>  glucagon  Injectable 1 milliGRAM(s) IntraMuscular once  hydrocortisone 2.5% Rectal Cream 1 Application(s) Rectal three times a day  hydrocortisone hemorrhoidal Suppository 1 Suppository(s) Rectal daily  insulin glargine Injectable (LANTUS) 20 Unit(s) SubCutaneous at bedtime  insulin lispro (ADMELOG) corrective regimen sliding scale   SubCutaneous three times a day before meals  insulin lispro (ADMELOG) corrective regimen sliding scale   SubCutaneous at bedtime  insulin lispro Injectable (ADMELOG) 8 Unit(s) SubCutaneous three times a day with meals  metoprolol tartrate 25 milliGRAM(s) Oral two times a day  midodrine 15 milliGRAM(s) Oral every 8 hours  multivitamin/minerals/iron Oral Solution (CENTRUM) 15 milliLiter(s) Oral daily  pantoprazole    Tablet 40 milliGRAM(s) Oral before breakfast  polyethylene glycol 3350 17 Gram(s) Oral daily  polyethylene glycol 3350 17 Gram(s) Oral once  psyllium Powder 1 Packet(s) Oral daily  senna 2 Tablet(s) Oral at bedtime  sevelamer carbonate 800 milliGRAM(s) Oral three times a day with meals  thiamine 100 milliGRAM(s) Oral daily    MEDICATIONS  (PRN):  fentaNYL    Injectable 50 MICROGram(s) IV Push every 5 minutes PRN Severe Pain (7 - 10)  guaiFENesin Oral Liquid (Sugar-Free) 200 milliGRAM(s) Oral every 6 hours PRN Cough  ondansetron Injectable 4 milliGRAM(s) IV Push once PRN Nausea and/or Vomiting  oxyCODONE    IR 5 milliGRAM(s) Oral once PRN Moderate Pain (4 - 6)  oxyCODONE    IR 10 milliGRAM(s) Oral once PRN Severe Pain (7 - 10)  simethicone 80 milliGRAM(s) Chew daily PRN Gas        Physical Exam:  General: well developed, well nourished, NAD  Cardiovascular: appears well perfused  Respiratory: respirations non labored  Extremities: FROM, warm  - RUE: incision w/ dressing and some strikethrough. Soft, no evidence of hematoma. Signal on cephalic vein proximal to incision, marked. Palpable radial and ulnar pulse. Sensory and motor intact   Neurological: A+Ox3    ASSESSMENT AND PLAN:  ASSESSMENT:  62y Male  s/p right upper extremity brachiocephalic arteriovenous fistula       PLAN:   - Continue ASA  - Protect right upper extremity   - Diet: resume diet   - Remainder care per primary       Vascular Surgery  [id number] SURGERY POST-OPERATIVE PROCEDURE NOTE    PROCEDURE: right upper extremity brachiocephalic arteriovenous fistula     SUBJECTIVE:  Patient seen and evaluated at the bedside, resting comfortably. Endorsing soreness around his incisions.       OBJECTIVE:   Vital Signs Last 24 Hrs  T(C): 36.2 (20 Jan 2023 21:30), Max: 36.6 (20 Jan 2023 03:15)  T(F): 97.2 (20 Jan 2023 21:30), Max: 97.8 (20 Jan 2023 03:15)  HR: 89 (20 Jan 2023 23:15) (76 - 89)  BP: 101/58 (20 Jan 2023 23:15) (97/53 - 118/62)  BP(mean): 73 (20 Jan 2023 23:15) (72 - 84)  RR: 15 (20 Jan 2023 23:15) (14 - 18)  SpO2: 100% (20 Jan 2023 23:15) (95% - 100%)    Parameters below as of 20 Jan 2023 23:15  Patient On (Oxygen Delivery Method): room air      I&O's Summary    19 Jan 2023 07:01  -  20 Jan 2023 07:00  --------------------------------------------------------  IN: 1680 mL / OUT: 2000 mL / NET: -320 mL    20 Jan 2023 07:01  -  20 Jan 2023 23:30  --------------------------------------------------------  IN: 120 mL / OUT: 0 mL / NET: 120 mL      I&O's Detail    19 Jan 2023 07:01  -  20 Jan 2023 07:00  --------------------------------------------------------  IN:    Oral Fluid: 880 mL    Other (mL): 800 mL  Total IN: 1680 mL    OUT:    Other (mL): 1800 mL    Voided (mL): 200 mL  Total OUT: 2000 mL    Total NET: -320 mL      20 Jan 2023 07:01  -  20 Jan 2023 23:30  --------------------------------------------------------  IN:    Oral Fluid: 120 mL  Total IN: 120 mL    OUT:  Total OUT: 0 mL    Total NET: 120 mL            Labs:                        9.6    11.77 )-----------( 133      ( 20 Jan 2023 00:49 )             30.7     01-20    133<L>  |  97  |  32<H>  ----------------------------<  124<H>  3.7   |  24  |  2.00<H>    Ca    8.9      20 Jan 2023 00:49  Phos  3.2     01-19  Mg     2.2     01-20    TPro  7.1  /  Alb  3.3  /  TBili  0.4  /  DBili  x   /  AST  17  /  ALT  8<L>  /  AlkPhos  77  01-19    PT/INR - ( 20 Jan 2023 00:49 )   PT: 12.0 sec;   INR: 1.03 ratio         PTT - ( 20 Jan 2023 00:49 )  PTT:26.5 sec      MEDICATIONS  (STANDING):  acetaminophen   IVPB .. 1000 milliGRAM(s) IV Intermittent once  aspirin  chewable 81 milliGRAM(s) Oral daily  atorvastatin 80 milliGRAM(s) Oral at bedtime  benzonatate 100 milliGRAM(s) Oral every 8 hours  caspofungin IVPB 50 milliGRAM(s) IV Intermittent every 24 hours  chlorhexidine 2% Cloths 1 Application(s) Topical <User Schedule>  chlorhexidine 4% Liquid 1 Application(s) Topical <User Schedule>  dextrose 50% Injectable 25 Gram(s) IV Push once  dextrose 50% Injectable 12.5 Gram(s) IV Push once  dextrose 50% Injectable 25 Gram(s) IV Push once  dextrose Oral Gel 15 Gram(s) Oral once  epoetin teena-epbx (RETACRIT) Injectable 5000 Unit(s) IV Push <User Schedule>  glucagon  Injectable 1 milliGRAM(s) IntraMuscular once  hydrocortisone 2.5% Rectal Cream 1 Application(s) Rectal three times a day  hydrocortisone hemorrhoidal Suppository 1 Suppository(s) Rectal daily  insulin glargine Injectable (LANTUS) 20 Unit(s) SubCutaneous at bedtime  insulin lispro (ADMELOG) corrective regimen sliding scale   SubCutaneous three times a day before meals  insulin lispro (ADMELOG) corrective regimen sliding scale   SubCutaneous at bedtime  insulin lispro Injectable (ADMELOG) 8 Unit(s) SubCutaneous three times a day with meals  metoprolol tartrate 25 milliGRAM(s) Oral two times a day  midodrine 15 milliGRAM(s) Oral every 8 hours  multivitamin/minerals/iron Oral Solution (CENTRUM) 15 milliLiter(s) Oral daily  pantoprazole    Tablet 40 milliGRAM(s) Oral before breakfast  polyethylene glycol 3350 17 Gram(s) Oral daily  polyethylene glycol 3350 17 Gram(s) Oral once  psyllium Powder 1 Packet(s) Oral daily  senna 2 Tablet(s) Oral at bedtime  sevelamer carbonate 800 milliGRAM(s) Oral three times a day with meals  thiamine 100 milliGRAM(s) Oral daily    MEDICATIONS  (PRN):  fentaNYL    Injectable 50 MICROGram(s) IV Push every 5 minutes PRN Severe Pain (7 - 10)  guaiFENesin Oral Liquid (Sugar-Free) 200 milliGRAM(s) Oral every 6 hours PRN Cough  ondansetron Injectable 4 milliGRAM(s) IV Push once PRN Nausea and/or Vomiting  oxyCODONE    IR 5 milliGRAM(s) Oral once PRN Moderate Pain (4 - 6)  oxyCODONE    IR 10 milliGRAM(s) Oral once PRN Severe Pain (7 - 10)  simethicone 80 milliGRAM(s) Chew daily PRN Gas        Physical Exam:  General: well developed, well nourished, NAD  Cardiovascular: appears well perfused  Respiratory: respirations non labored  Extremities: FROM, warm  - RUE: incision w/ dressing and some strikethrough. Soft, no evidence of hematoma. Signal on cephalic vein proximal to incision, marked. Palpable radial and ulnar pulse. Sensory and motor intact   Neurological: A+Ox3    ASSESSMENT AND PLAN:  ASSESSMENT:  62y Male  s/p right upper extremity brachiocephalic arteriovenous fistula       PLAN:   - Continue ASA  - Protect right upper extremity   - Diet: resume diet   - Remainder care per primary       Vascular Surgery  [id number] SURGERY POST-OPERATIVE PROCEDURE NOTE    PROCEDURE: right upper extremity brachiocephalic arteriovenous fistula     SUBJECTIVE:  Patient seen and evaluated at the bedside, resting comfortably. Endorsing soreness around his incisions.       OBJECTIVE:   Vital Signs Last 24 Hrs  T(C): 36.2 (20 Jan 2023 21:30), Max: 36.6 (20 Jan 2023 03:15)  T(F): 97.2 (20 Jan 2023 21:30), Max: 97.8 (20 Jan 2023 03:15)  HR: 89 (20 Jan 2023 23:15) (76 - 89)  BP: 101/58 (20 Jan 2023 23:15) (97/53 - 118/62)  BP(mean): 73 (20 Jan 2023 23:15) (72 - 84)  RR: 15 (20 Jan 2023 23:15) (14 - 18)  SpO2: 100% (20 Jan 2023 23:15) (95% - 100%)    Parameters below as of 20 Jan 2023 23:15  Patient On (Oxygen Delivery Method): room air      I&O's Summary    19 Jan 2023 07:01  -  20 Jan 2023 07:00  --------------------------------------------------------  IN: 1680 mL / OUT: 2000 mL / NET: -320 mL    20 Jan 2023 07:01  -  20 Jan 2023 23:30  --------------------------------------------------------  IN: 120 mL / OUT: 0 mL / NET: 120 mL      I&O's Detail    19 Jan 2023 07:01  -  20 Jan 2023 07:00  --------------------------------------------------------  IN:    Oral Fluid: 880 mL    Other (mL): 800 mL  Total IN: 1680 mL    OUT:    Other (mL): 1800 mL    Voided (mL): 200 mL  Total OUT: 2000 mL    Total NET: -320 mL      20 Jan 2023 07:01  -  20 Jan 2023 23:30  --------------------------------------------------------  IN:    Oral Fluid: 120 mL  Total IN: 120 mL    OUT:  Total OUT: 0 mL    Total NET: 120 mL            Labs:                        9.6    11.77 )-----------( 133      ( 20 Jan 2023 00:49 )             30.7     01-20    133<L>  |  97  |  32<H>  ----------------------------<  124<H>  3.7   |  24  |  2.00<H>    Ca    8.9      20 Jan 2023 00:49  Phos  3.2     01-19  Mg     2.2     01-20    TPro  7.1  /  Alb  3.3  /  TBili  0.4  /  DBili  x   /  AST  17  /  ALT  8<L>  /  AlkPhos  77  01-19    PT/INR - ( 20 Jan 2023 00:49 )   PT: 12.0 sec;   INR: 1.03 ratio         PTT - ( 20 Jan 2023 00:49 )  PTT:26.5 sec      MEDICATIONS  (STANDING):  acetaminophen   IVPB .. 1000 milliGRAM(s) IV Intermittent once  aspirin  chewable 81 milliGRAM(s) Oral daily  atorvastatin 80 milliGRAM(s) Oral at bedtime  benzonatate 100 milliGRAM(s) Oral every 8 hours  caspofungin IVPB 50 milliGRAM(s) IV Intermittent every 24 hours  chlorhexidine 2% Cloths 1 Application(s) Topical <User Schedule>  chlorhexidine 4% Liquid 1 Application(s) Topical <User Schedule>  dextrose 50% Injectable 25 Gram(s) IV Push once  dextrose 50% Injectable 12.5 Gram(s) IV Push once  dextrose 50% Injectable 25 Gram(s) IV Push once  dextrose Oral Gel 15 Gram(s) Oral once  epoetin teena-epbx (RETACRIT) Injectable 5000 Unit(s) IV Push <User Schedule>  glucagon  Injectable 1 milliGRAM(s) IntraMuscular once  hydrocortisone 2.5% Rectal Cream 1 Application(s) Rectal three times a day  hydrocortisone hemorrhoidal Suppository 1 Suppository(s) Rectal daily  insulin glargine Injectable (LANTUS) 20 Unit(s) SubCutaneous at bedtime  insulin lispro (ADMELOG) corrective regimen sliding scale   SubCutaneous three times a day before meals  insulin lispro (ADMELOG) corrective regimen sliding scale   SubCutaneous at bedtime  insulin lispro Injectable (ADMELOG) 8 Unit(s) SubCutaneous three times a day with meals  metoprolol tartrate 25 milliGRAM(s) Oral two times a day  midodrine 15 milliGRAM(s) Oral every 8 hours  multivitamin/minerals/iron Oral Solution (CENTRUM) 15 milliLiter(s) Oral daily  pantoprazole    Tablet 40 milliGRAM(s) Oral before breakfast  polyethylene glycol 3350 17 Gram(s) Oral daily  polyethylene glycol 3350 17 Gram(s) Oral once  psyllium Powder 1 Packet(s) Oral daily  senna 2 Tablet(s) Oral at bedtime  sevelamer carbonate 800 milliGRAM(s) Oral three times a day with meals  thiamine 100 milliGRAM(s) Oral daily    MEDICATIONS  (PRN):  fentaNYL    Injectable 50 MICROGram(s) IV Push every 5 minutes PRN Severe Pain (7 - 10)  guaiFENesin Oral Liquid (Sugar-Free) 200 milliGRAM(s) Oral every 6 hours PRN Cough  ondansetron Injectable 4 milliGRAM(s) IV Push once PRN Nausea and/or Vomiting  oxyCODONE    IR 5 milliGRAM(s) Oral once PRN Moderate Pain (4 - 6)  oxyCODONE    IR 10 milliGRAM(s) Oral once PRN Severe Pain (7 - 10)  simethicone 80 milliGRAM(s) Chew daily PRN Gas        Physical Exam:  General: well developed, well nourished, NAD  Cardiovascular: appears well perfused  Respiratory: respirations non labored  Extremities: FROM, warm  - RUE: incision w/ dressing and some strikethrough. Soft, no evidence of hematoma. Signal on cephalic vein proximal to incision, marked. Palpable radial and ulnar pulse. Sensory and motor intact   Neurological: A+Ox3    ASSESSMENT AND PLAN:  ASSESSMENT:  62y Male  s/p right upper extremity brachiocephalic arteriovenous fistula       PLAN:   - Continue ASA  - Protect right upper extremity   - Diet: resume diet   - Remainder care per primary       Vascular Surgery  [id number]

## 2023-01-20 NOTE — BRIEF OPERATIVE NOTE - SPECIMENS
Pharmacy Medication History  Admission medication history interview status for the 3/31/2021  admission is complete. See EPIC admission navigator for prior to admission medications     Location of Interview: Patient room  Medication history sources: Patient    Significant changes made to the medication list:  Added all meds    In the past week, patient estimated taking medication this percent of the time: greater than 90%      Medication reconciliation completed by provider prior to medication history? No    Time spent in this activity: 5 minutes    Prior to Admission medications    Medication Sig Last Dose Taking? Auth Provider   LORazepam (ATIVAN) 0.5 MG tablet Take 0.25 mg by mouth daily 1/2 x 0.5 mg tab 3/31/2021 at AM Yes Unknown, Entered By History   PARoxetine (PAXIL) 30 MG tablet Take 30 mg by mouth At Bedtime 3/30/2021 at HS Yes Unknown, Entered By History       The information provided in this note is only as accurate as the sources available at the time of update(s)     
N/A
none
NA
N/A

## 2023-01-20 NOTE — PROGRESS NOTE ADULT - SUBJECTIVE AND OBJECTIVE BOX
Chief complaint  Patient is a 62y old  Male who presents with a chief complaint of Acute cholecystitis, gallstone pancreatitis (20 Jan 2023 08:58)    Patient in bedside chair, looks comfortable. No complaints    Labs and Fingersticks  CAPILLARY BLOOD GLUCOSE      POCT Blood Glucose.: 121 mg/dL (20 Jan 2023 10:58)  POCT Blood Glucose.: 108 mg/dL (20 Jan 2023 07:35)  POCT Blood Glucose.: 118 mg/dL (19 Jan 2023 23:21)  POCT Blood Glucose.: 107 mg/dL (19 Jan 2023 16:23)      Anion Gap, Serum: 12 (01-20 @ 00:49)  Anion Gap, Serum: 16 (01-19 @ 09:52)      Calcium, Total Serum: 8.9 (01-20 @ 00:49)  Calcium, Total Serum: 8.7 (01-19 @ 09:52)  Albumin, Serum: 3.3 (01-19 @ 09:52)    Alanine Aminotransferase (ALT/SGPT): 8 *L* (01-19 @ 09:52)  Alkaline Phosphatase, Serum: 77 (01-19 @ 09:52)  Aspartate Aminotransferase (AST/SGOT): 17 (01-19 @ 09:52)        01-20    133<L>  |  97  |  32<H>  ----------------------------<  124<H>  3.7   |  24  |  2.00<H>    Ca    8.9      20 Jan 2023 00:49  Phos  3.2     01-19  Mg     2.2     01-20    TPro  7.1  /  Alb  3.3  /  TBili  0.4  /  DBili  x   /  AST  17  /  ALT  8<L>  /  AlkPhos  77  01-19                        9.6    11.77 )-----------( 133      ( 20 Jan 2023 00:49 )             30.7     Medications  MEDICATIONS  (STANDING):  aspirin  chewable 81 milliGRAM(s) Oral daily  atorvastatin 80 milliGRAM(s) Oral at bedtime  benzonatate 100 milliGRAM(s) Oral every 8 hours  caspofungin IVPB 50 milliGRAM(s) IV Intermittent every 24 hours  chlorhexidine 2% Cloths 1 Application(s) Topical <User Schedule>  chlorhexidine 4% Liquid 1 Application(s) Topical <User Schedule>  dextrose 50% Injectable 25 Gram(s) IV Push once  dextrose 50% Injectable 12.5 Gram(s) IV Push once  dextrose 50% Injectable 25 Gram(s) IV Push once  dextrose Oral Gel 15 Gram(s) Oral once  epoetin teena-epbx (RETACRIT) Injectable 5000 Unit(s) IV Push <User Schedule>  glucagon  Injectable 1 milliGRAM(s) IntraMuscular once  hydrocortisone 2.5% Rectal Cream 1 Application(s) Rectal three times a day  hydrocortisone hemorrhoidal Suppository 1 Suppository(s) Rectal daily  insulin glargine Injectable (LANTUS) 20 Unit(s) SubCutaneous at bedtime  insulin lispro (ADMELOG) corrective regimen sliding scale   SubCutaneous three times a day before meals  insulin lispro (ADMELOG) corrective regimen sliding scale   SubCutaneous at bedtime  insulin lispro Injectable (ADMELOG) 8 Unit(s) SubCutaneous three times a day with meals  metoprolol tartrate 25 milliGRAM(s) Oral two times a day  midodrine 15 milliGRAM(s) Oral every 8 hours  multivitamin/minerals/iron Oral Solution (CENTRUM) 15 milliLiter(s) Oral daily  pantoprazole    Tablet 40 milliGRAM(s) Oral before breakfast  polyethylene glycol 3350 17 Gram(s) Oral daily  polyethylene glycol 3350 17 Gram(s) Oral once  psyllium Powder 1 Packet(s) Oral daily  senna 2 Tablet(s) Oral at bedtime  sevelamer carbonate 800 milliGRAM(s) Oral three times a day with meals  thiamine 100 milliGRAM(s) Enteral Tube daily      Physical Exam  General: Patient comfortable in chair  Vital Signs Last 12 Hrs  T(F): 97.5 (01-20-23 @ 06:52), Max: 97.8 (01-20-23 @ 03:15)  HR: 76 (01-20-23 @ 06:52) (76 - 88)  BP: 104/55 (01-20-23 @ 06:52) (104/55 - 109/56)  BP(mean): 73 (01-20-23 @ 06:52) (73 - 76)  RR: 18 (01-20-23 @ 06:52) (18 - 18)  SpO2: 100% (01-20-23 @ 06:52) (95% - 100%)  CVS: S1S2, No murmurs  Respiratory: No wheezing, no crepitations  GI: Abdomen soft, bowel sounds positive  Musculoskeletal: trace edema lower extremities.   Skin: No skin rashes, no ecchymosis midsternal inc               Chief complaint  Patient is a 62y old  Male who presents with a chief complaint of Acute cholecystitis, gallstone pancreatitis (20 Jan 2023 08:58)    Patient in bedside chair, looks comfortable. No complaints    Labs and Fingersticks  CAPILLARY BLOOD GLUCOSE      POCT Blood Glucose.: 121 mg/dL (20 Jan 2023 10:58)  POCT Blood Glucose.: 108 mg/dL (20 Jan 2023 07:35)  POCT Blood Glucose.: 118 mg/dL (19 Jan 2023 23:21)  POCT Blood Glucose.: 107 mg/dL (19 Jan 2023 16:23)      Anion Gap, Serum: 12 (01-20 @ 00:49)  Anion Gap, Serum: 16 (01-19 @ 09:52)      Calcium, Total Serum: 8.9 (01-20 @ 00:49)  Calcium, Total Serum: 8.7 (01-19 @ 09:52)  Albumin, Serum: 3.3 (01-19 @ 09:52)    Alanine Aminotransferase (ALT/SGPT): 8 *L* (01-19 @ 09:52)  Alkaline Phosphatase, Serum: 77 (01-19 @ 09:52)  Aspartate Aminotransferase (AST/SGOT): 17 (01-19 @ 09:52)        01-20    133<L>  |  97  |  32<H>  ----------------------------<  124<H>  3.7   |  24  |  2.00<H>    Ca    8.9      20 Jan 2023 00:49  Phos  3.2     01-19  Mg     2.2     01-20    TPro  7.1  /  Alb  3.3  /  TBili  0.4  /  DBili  x   /  AST  17  /  ALT  8<L>  /  AlkPhos  77  01-19                        9.6    11.77 )-----------( 133      ( 20 Jan 2023 00:49 )             30.7     Medications  MEDICATIONS  (STANDING):  aspirin  chewable 81 milliGRAM(s) Oral daily  atorvastatin 80 milliGRAM(s) Oral at bedtime  benzonatate 100 milliGRAM(s) Oral every 8 hours  caspofungin IVPB 50 milliGRAM(s) IV Intermittent every 24 hours  chlorhexidine 2% Cloths 1 Application(s) Topical <User Schedule>  chlorhexidine 4% Liquid 1 Application(s) Topical <User Schedule>  dextrose 50% Injectable 25 Gram(s) IV Push once  dextrose 50% Injectable 12.5 Gram(s) IV Push once  dextrose 50% Injectable 25 Gram(s) IV Push once  dextrose Oral Gel 15 Gram(s) Oral once  epoetin teena-epbx (RETACRIT) Injectable 5000 Unit(s) IV Push <User Schedule>  glucagon  Injectable 1 milliGRAM(s) IntraMuscular once  hydrocortisone 2.5% Rectal Cream 1 Application(s) Rectal three times a day  hydrocortisone hemorrhoidal Suppository 1 Suppository(s) Rectal daily  insulin glargine Injectable (LANTUS) 20 Unit(s) SubCutaneous at bedtime  insulin lispro (ADMELOG) corrective regimen sliding scale   SubCutaneous three times a day before meals  insulin lispro (ADMELOG) corrective regimen sliding scale   SubCutaneous at bedtime  insulin lispro Injectable (ADMELOG) 8 Unit(s) SubCutaneous three times a day with meals  metoprolol tartrate 25 milliGRAM(s) Oral two times a day  midodrine 15 milliGRAM(s) Oral every 8 hours  multivitamin/minerals/iron Oral Solution (CENTRUM) 15 milliLiter(s) Oral daily  pantoprazole    Tablet 40 milliGRAM(s) Oral before breakfast  polyethylene glycol 3350 17 Gram(s) Oral daily  polyethylene glycol 3350 17 Gram(s) Oral once  psyllium Powder 1 Packet(s) Oral daily  senna 2 Tablet(s) Oral at bedtime  sevelamer carbonate 800 milliGRAM(s) Oral three times a day with meals  thiamine 100 milliGRAM(s) Enteral Tube daily      Physical Exam  General: Patient comfortable in chair  Vital Signs Last 12 Hrs  T(F): 97.5 (01-20-23 @ 06:52), Max: 97.8 (01-20-23 @ 03:15)  HR: 76 (01-20-23 @ 06:52) (76 - 88)  BP: 104/55 (01-20-23 @ 06:52) (104/55 - 109/56)  BP(mean): 73 (01-20-23 @ 06:52) (73 - 76)  RR: 18 (01-20-23 @ 06:52) (18 - 18)  SpO2: 100% (01-20-23 @ 06:52) (95% - 100%)  CVS: S1S2, No murmurs  Respiratory: No wheezing, no crepitations  GI: Abdomen soft, bowel sounds positive  Musculoskeletal: trace edema lower extremities.   Skin: No skin rashes, no ecchymosis midsternal inc

## 2023-01-20 NOTE — BRIEF OPERATIVE NOTE - OPERATION/FINDINGS
right brachiocephalic avf, hemostats achieved in the end, signal on cephalic vein,  noncomplicated.
ECMO
CODE ECMO initiated for eCPR and patient placed on [facility] ECMO while undergoing CPR.     US guided access of R CFA with 5Fr micropuncture kit, single stick. Via modified Seldinger technique 19Fr [employer] arterial cannula placed.  US guided access of L CFV with 5Fr micropuncture kit, single stick. Via modified Seldinger technique, 25Fr [employer] venous cannula placed.     Arterial: R CFA 19 Fr [employer]  Venous: L CFV 25 Fr [employer]
S/P # 7 Proximal XLT Trach placed, Surgicel placed around stoma.

## 2023-01-20 NOTE — PROGRESS NOTE ADULT - ASSESSMENT
Assessment  DMT2: 62y Male with DM T2 A1C 7.3% with hyperglycemia admitted with CAD, patient was on NPH and insulin coverage, sugars improving, now with in target range, eating full meals.  CAD: S/P CABG, on medications, monitored, asymptomatic.  HTN: On antihypertensive medications, monitored, asymptomatic.              Nayeli Quiles MD  Cell:  [phone]  Office: [phone]               Assessment  DMT2: 62y Male with DM T2 A1C 7.3% with hyperglycemia admitted with CAD, patient was on NPH and insulin coverage, sugars improving, now with in target range, eating full meals.  CAD: S/P CABG, on medications, monitored, asymptomatic.  HTN: On antihypertensive medications, monitored, asymptomatic.              Nayeli Quiles MD  Cell:  [phone]  Office: [phone]               Assessment  DMT2: 62y Male with DM T2 A1C 7.3% with hyperglycemia admitted with CAD, patient was on NPH and insulin coverage, sugars improving, now with in target range, eating full meals.  CAD: S/P CABG, on medications, monitored, asymptomatic.  HTN: On antihypertensive medications, monitored, asymptomatic.              Nayeli Quiles MD  Cell:  [phone]  Office: [phone]               Assessment  DMT2: 62y Male with DM T2 A1C 7.3% with hyperglycemia admitted with CAD, patient was on NPH and insulin coverage, sugars improving,  now with in target range, eating full meals.  CAD: S/P CABG, on medications, monitored, asymptomatic.  HTN: On antihypertensive medications, monitored, asymptomatic.              Nayeli Quiles MD  Cell:  [phone]  Office: [phone]               Assessment  DMT2: 62y Male with DM T2 A1C 7.3% with hyperglycemia admitted with CAD, patient was on NPH and insulin coverage, sugars improving,  now with in target range, eating full meals.  CAD: S/P CABG, on medications, monitored, asymptomatic.  HTN: On antihypertensive medications, monitored, asymptomatic.              Nayeli Quiles MD  Cell:  [phone]  Office: [phone]               Assessment  DMT2: 62y Male with DM T2 A1C 7.3% with hyperglycemia admitted with CAD, patient was on NPH and insulin coverage, sugars improving,  now with in target range, eating full meals.  CAD: S/P CABG, on medications, monitored, asymptomatic.  HTN: On antihypertensive medications, monitored, asymptomatic.              Nayeli Quiles MD  Cell:  [phone]  Office: [phone]

## 2023-01-20 NOTE — BRIEF OPERATIVE NOTE - NSICDXBRIEFPROCEDURE_GEN_ALL_CORE_FT
PROCEDURES:  Insertion, cannula, percutaneous, adult, for ECMO 26-Nov-2022 06:49:38  Stef Diaz  
PROCEDURES:  Planned tracheostomy 16-Dec-2022 18:25:02  Bienvenido Kennedy  
PROCEDURES:  Open removal of extracorporeal membrane oxygenation (ECMO) cannula in patient 6 years of age or older 08-Dec-2022 10:58:28 V/A ECMO decannulation and R femoral artery repair Genoveva Lundberg  
PROCEDURES:  Creation, AV fistula, brachiocephalic 20-Jan-2023 19:56:04  Campos Gasca

## 2023-01-20 NOTE — BRIEF OPERATIVE NOTE - NSICDXBRIEFPOSTOP_GEN_ALL_CORE_FT
POST-OP DIAGNOSIS:  Personal history of ECMO 08-Dec-2022 11:00:24  Genoveva Lundberg  
POST-OP DIAGNOSIS:  Cardiac arrest 26-Nov-2022 06:50:12  Stef Diaz  
POST-OP DIAGNOSIS:  ESRD on dialysis 20-Jan-2023 19:56:45  Campos Gasca

## 2023-01-20 NOTE — PRE-ANESTHESIA EVALUATION ADULT - NSANTHOSAYNRD_GEN_A_CORE
No. ARNULFO screening performed.  STOP BANG Legend: 0-2 = LOW Risk; 3-4 = INTERMEDIATE Risk; 5-8 = HIGH Risk

## 2023-01-20 NOTE — PROGRESS NOTE ADULT - ASSESSMENT
63 YO M with a history of CAD s/p 4v CABG ~2019 in [facility], DM2 (A1c 7.3%), and HTN who initially presented to OSH with abdominal pain and n/v and found to have pancreatitis with lipase > 3000 though also with elevated troponins. CT abdomen revealed acute pancreatitis and his initial LVEF was 40-45%. He developed MSOF with hypoxic respiratory failure requiring intubation and ERIC and went into hypoxic PEA arrest requiring ACLS and due to persistent hypoxia and hypotension on pressors after ROSC was cannulated to VA ECMO (LFV, RFA, no anterograde perfusion catheter) on 11/25. Notably CTH that day revealed small subdural hemorrhage.     His post arrest TTE on 11/26 showed a significantly worse LVEF of 5-10% with an AV that was only minimally opening. Since then he has had improvement in his LV function to ~35-40% and improved pulsatility while tolerating progressive slow ECMO weans. ECMO turndown (note in chart 11/30) was reassuring for ability to decannulate to IABP/inotropes. LHC 12/06 showed closure of his 3 vein grafts with graft to LAD patent though with distal native disease. No coronary intervention was done. IABP placed, ECMO successfully decannulated 12/08 (transfused 2u pRBCs during).     His ARDS has improved and he's now s/p trach (decannulated 1/8). He's now off IABP, removed 12/17, and inotropes, however has previously been unable to tolerate GDMT due to soft BPs and renal failure requiring dialysis. 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 He's afebrile with improving leukocytosis. He has urine output but not enough to stay euvolemic. He's tolerating intermittent HD and remains on midodrine for BP support.     11/24 Transfer from [facility] to SICU c/f STEMI, abd US +GB stones, pericholecystic fluid  11/25 VA ECMO s/p hypoxic respiratory arrest/PEA arrest, CTH 4mm subdural, ERIC  12/5 CVVHD, RUQ US neg   12/6 IABP placed in cath lab, LIMA to LAD patent, RCA and LCx down, CTH subdural decreased in size, persistent pancreatic inflammation   12/7 TTE turndown, EF 30-40%, nml RV, MR mod, mod TR  12/8 ECMO decannulation, aflutter DCCV x 2  12/12 Mental status change- CTH no change, CT perfusion/CTA H/N neg for LVO, +low grade watershed infarct to L MCA  12/16 Mitral clip x 1, TRACH 7 Shiley XLT w/ ENT  12/17 IABP dced  12/26 +clinton BCx   12/27 R groin vac  1/8 trach decannulation   1/10 passed FEEST  1/11 R permacath placed by IR  1/13 Transferred to SDU  1/14 VSS; RSR ; hd as per renal MWF; continue vac to rt groin ; nutritional optimization and PT; [facility] sx called for AV fistula placement on this admission (vein mapping completed 12/22) ; continue capsofungin as per Dr. Horton; endo consulted today for dm management- placed on lantus and admelog  1/15 VSS; RSR 70-80; HD as per renal - k 3.8 today; continue nutritional support and pt; prealbumin 21; await date of AVF from [facility] sx--> pt cleared as per cardiac surgeon Dr. Horton- pt must have AVF with cardiac anesthesia-discussed with [facility] sx fellow Rajeev Ascencio; continue vac to rt groin; bs improved on lantus/ admelog   discharge planning- acute rehab when stable   1/16      HD,   rt groin vac     labs stable  1/17 Increase ambulation.   AVF this week, will need cardiac anesthesia   May transfer to floor  Cont capsofungin  1/18 AVF this week with cardiac anesthesia/vascular, cleared by Dr horton .  Remains on capsofungin prophylactically, completed coursse  + cough, HD today , cxr done, clear  Colorectal vizcaino for rectal pain, rectal fissure-- hydrocortisone  1/19 AVF tomorrow.  Additional HD session today and likely Saturday  The patient would like to go home instead of rehab, choosing [facility] dialysis in [place]   1/20  avf today.  , add metamucil rectal fissure pain  Additional HD yesterday, next HD Saturday.  Transfer to floor     63 YO M with a history of CAD s/p 4v CABG ~2019 in [facility], DM2 (A1c 7.3%), and HTN who initially presented to OSH with abdominal pain and n/v and found to have pancreatitis with lipase > 3000 though also with elevated troponins. CT abdomen revealed acute pancreatitis and his initial LVEF was 40-45%. He developed MSOF with hypoxic respiratory failure requiring intubation and ERIC and went into hypoxic PEA arrest requiring ACLS and due to persistent hypoxia and hypotension on pressors after ROSC was cannulated to VA ECMO (LFV, RFA, no anterograde perfusion catheter) on 11/25. Notably CTH that day revealed small subdural hemorrhage.     His post arrest TTE on 11/26 showed a significantly worse LVEF of 5-10% with an AV that was only minimally opening. Since then he has had improvement in his LV function to ~35-40% and improved pulsatility while tolerating progressive slow ECMO weans. ECMO turndown (note in chart 11/30) was reassuring for ability to decannulate to IABP/inotropes. LHC 12/06 showed closure of his 3 vein grafts with graft to LAD patent though with distal native disease. No coronary intervention was done. IABP placed, ECMO successfully decannulated 12/08 (transfused 2u pRBCs during).     His ARDS has improved and he's now s/p trach (decannulated 1/8). He's now off IABP, removed 12/17, and inotropes, however has previously been unable to tolerate GDMT due to soft BPs and renal failure requiring dialysis. 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 He's afebrile with improving leukocytosis. He has urine output but not enough to stay euvolemic. He's tolerating intermittent HD and remains on midodrine for BP support.     11/24 Transfer from [facility] to SICU c/f STEMI, abd US +GB stones, pericholecystic fluid  11/25 VA ECMO s/p hypoxic respiratory arrest/PEA arrest, CTH 4mm subdural, ERIC  12/5 CVVHD, RUQ US neg   12/6 IABP placed in cath lab, LIMA to LAD patent, RCA and LCx down, CTH subdural decreased in size, persistent pancreatic inflammation   12/7 TTE turndown, EF 30-40%, nml RV, MR mod, mod TR  12/8 ECMO decannulation, aflutter DCCV x 2  12/12 Mental status change- CTH no change, CT perfusion/CTA H/N neg for LVO, +low grade watershed infarct to L MCA  12/16 Mitral clip x 1, TRACH 7 Shiley XLT w/ ENT  12/17 IABP dced  12/26 +clinton BCx   12/27 R groin vac  1/8 trach decannulation   1/10 passed FEEST  1/11 R permacath placed by IR  1/13 Transferred to SDU  1/14 VSS; RSR ; hd as per renal MWF; continue vac to rt groin ; nutritional optimization and PT; [facility] sx called for AV fistula placement on this admission (vein mapping completed 12/22) ; continue capsofungin as per Dr. Horton; endo consulted today for dm management- placed on lantus and admelog  1/15 VSS; RSR 70-80; HD as per renal - k 3.8 today; continue nutritional support and pt; prealbumin 21; await date of AVF from [facility] sx--> pt cleared as per cardiac surgeon Dr. Horton- pt must have AVF with cardiac anesthesia-discussed with [facility] sx fellow Rajeev Ascencio; continue vac to rt groin; bs improved on lantus/ admelog   discharge planning- acute rehab when stable   1/16      HD,   rt groin vac     labs stable  1/17 Increase ambulation.   AVF this week, will need cardiac anesthesia   May transfer to floor  Cont capsofungin  1/18 AVF this week with cardiac anesthesia/vascular, cleared by Dr horton .  Remains on capsofungin prophylactically, completed coursse  + cough, HD today , cxr done, clear  Colorectal vizcaino for rectal pain, rectal fissure-- hydrocortisone  1/19 AVF tomorrow.  Additional HD session today and likely Saturday  The patient would like to go home instead of rehab, choosing [facility] dialysis in [place]   1/20  avf today.  , add metamucil rectal fissure pain  Additional HD yesterday, next HD Saturday.  Transfer to floor     63 YO M with a history of CAD s/p 4v CABG ~2019 in [facility], DM2 (A1c 7.3%), and HTN who initially presented to OSH with abdominal pain and n/v and found to have pancreatitis with lipase > 3000 though also with elevated troponins. CT abdomen revealed acute pancreatitis and his initial LVEF was 40-45%. He developed MSOF with hypoxic respiratory failure requiring intubation and ERIC and went into hypoxic PEA arrest requiring ACLS and due to persistent hypoxia and hypotension on pressors after ROSC was cannulated to VA ECMO (LFV, RFA, no anterograde perfusion catheter) on 11/25. Notably CTH that day revealed small subdural hemorrhage.     His post arrest TTE on 11/26 showed a significantly worse LVEF of 5-10% with an AV that was only minimally opening. Since then he has had improvement in his LV function to ~35-40% and improved pulsatility while tolerating progressive slow ECMO weans. ECMO turndown (note in chart 11/30) was reassuring for ability to decannulate to IABP/inotropes. LHC 12/06 showed closure of his 3 vein grafts with graft to LAD patent though with distal native disease. No coronary intervention was done. IABP placed, ECMO successfully decannulated 12/08 (transfused 2u pRBCs during).     His ARDS has improved and he's now s/p trach (decannulated 1/8). He's now off IABP, removed 12/17, and inotropes, however has previously been unable to tolerate GDMT due to soft BPs and renal failure requiring dialysis. 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 He's afebrile with improving leukocytosis. He has urine output but not enough to stay euvolemic. He's tolerating intermittent HD and remains on midodrine for BP support.     11/24 Transfer from [facility] to SICU c/f STEMI, abd US +GB stones, pericholecystic fluid  11/25 VA ECMO s/p hypoxic respiratory arrest/PEA arrest, CTH 4mm subdural, ERIC  12/5 CVVHD, RUQ US neg   12/6 IABP placed in cath lab, LIMA to LAD patent, RCA and LCx down, CTH subdural decreased in size, persistent pancreatic inflammation   12/7 TTE turndown, EF 30-40%, nml RV, MR mod, mod TR  12/8 ECMO decannulation, aflutter DCCV x 2  12/12 Mental status change- CTH no change, CT perfusion/CTA H/N neg for LVO, +low grade watershed infarct to L MCA  12/16 Mitral clip x 1, TRACH 7 Shiley XLT w/ ENT  12/17 IABP dced  12/26 +clinton BCx   12/27 R groin vac  1/8 trach decannulation   1/10 passed FEEST  1/11 R permacath placed by IR  1/13 Transferred to SDU  1/14 VSS; RSR ; hd as per renal MWF; continue vac to rt groin ; nutritional optimization and PT; [facility] sx called for AV fistula placement on this admission (vein mapping completed 12/22) ; continue capsofungin as per Dr. Horton; endo consulted today for dm management- placed on lantus and admelog  1/15 VSS; RSR 70-80; HD as per renal - k 3.8 today; continue nutritional support and pt; prealbumin 21; await date of AVF from [facility] sx--> pt cleared as per cardiac surgeon Dr. Horton- pt must have AVF with cardiac anesthesia-discussed with [facility] sx fellow Rajeev Ascencio; continue vac to rt groin; bs improved on lantus/ admelog   discharge planning- acute rehab when stable   1/16      HD,   rt groin vac     labs stable  1/17 Increase ambulation.   AVF this week, will need cardiac anesthesia   May transfer to floor  Cont capsofungin  1/18 AVF this week with cardiac anesthesia/vascular, cleared by Dr horton .  Remains on capsofungin prophylactically, completed coursse  + cough, HD today , cxr done, clear  Colorectal vizcaino for rectal pain, rectal fissure-- hydrocortisone  1/19 AVF tomorrow.  Additional HD session today and likely Saturday  The patient would like to go home instead of rehab, choosing [facility] dialysis in [place]   1/20  avf today.  , add metamucil rectal fissure pain  Additional HD yesterday, next HD Saturday.  Transfer to floor

## 2023-01-20 NOTE — PROGRESS NOTE ADULT - SUBJECTIVE AND OBJECTIVE BOX
VITAL SIGNS    Telemetry:  nsr 80    Vital Signs Last 24 Hrs  T(C): 36.4 (23 @ 06:52), Max: 36.9 (23 @ 10:43)  T(F): 97.5 (23 @ 06:52), Max: 98.4 (23 @ 10:43)  HR: 76 (23 @ 06:52) (76 - 88)  BP: 104/55 (23 @ 06:52) (96/54 - 109/56)  RR: 18 (23 @ 06:52) (18 - 18)  SpO2: 100% (23 @ 06:52) (95% - 100%)                    07:01  -   @ 07:00  --------------------------------------------------------  IN: 1680 mL / OUT: 2000 mL / NET: -320 mL          Daily Height in cm: 172.7 (2023 03:42)    Daily Weight in k.3 (2023 05:57)            CAPILLARY BLOOD GLUCOSE      POCT Blood Glucose.: 108 mg/dL (2023 07:35)  POCT Blood Glucose.: 118 mg/dL (2023 23:21)  POCT Blood Glucose.: 107 mg/dL (2023 16:23)  POCT Blood Glucose.: 134 mg/dL (2023 11:11)            Drains:         Pacing Wires        Coumadin    [ ] YES          [ x ]      NO                                   PHYSICAL EXAM    Neurology: alert and oriented x 3, nonfocal, no gross deficits  CV : s1 s2 RRR  Sternal Wound : healed  Lungs: cta  Abdomen: soft, nontender, nondistended, positive bowel sounds, last bowel movement +                      :    voiding    Extremities:      -edema   /  -   calve tenderness ,               acetaminophen     Tablet .. 650 milliGRAM(s) Oral every 6 hours PRN  aspirin  chewable 81 milliGRAM(s) Oral daily  atorvastatin 80 milliGRAM(s) Oral at bedtime  benzonatate 100 milliGRAM(s) Oral every 8 hours  caspofungin IVPB 50 milliGRAM(s) IV Intermittent every 24 hours  chlorhexidine 2% Cloths 1 Application(s) Topical <User Schedule>  chlorhexidine 4% Liquid 1 Application(s) Topical <User Schedule>  dextrose 50% Injectable 25 Gram(s) IV Push once  dextrose 50% Injectable 12.5 Gram(s) IV Push once  dextrose 50% Injectable 25 Gram(s) IV Push once  dextrose Oral Gel 15 Gram(s) Oral once  epoetin teena-epbx (RETACRIT) Injectable 5000 Unit(s) IV Push <User Schedule>  glucagon  Injectable 1 milliGRAM(s) IntraMuscular once  guaiFENesin Oral Liquid (Sugar-Free) 200 milliGRAM(s) Oral every 6 hours PRN  hydrocortisone 2.5% Rectal Cream 1 Application(s) Rectal three times a day  hydrocortisone hemorrhoidal Suppository 1 Suppository(s) Rectal daily  insulin glargine Injectable (LANTUS) 20 Unit(s) SubCutaneous at bedtime  insulin lispro (ADMELOG) corrective regimen sliding scale   SubCutaneous three times a day before meals  insulin lispro (ADMELOG) corrective regimen sliding scale   SubCutaneous at bedtime  insulin lispro Injectable (ADMELOG) 8 Unit(s) SubCutaneous three times a day with meals  metoprolol tartrate 25 milliGRAM(s) Oral two times a day  midodrine 15 milliGRAM(s) Oral every 8 hours  multivitamin/minerals/iron Oral Solution (CENTRUM) 15 milliLiter(s) Oral daily  pantoprazole    Tablet 40 milliGRAM(s) Oral before breakfast  polyethylene glycol 3350 17 Gram(s) Oral daily  polyethylene glycol 3350 17 Gram(s) Oral once  psyllium Powder 1 Packet(s) Oral daily  senna 2 Tablet(s) Oral at bedtime  sevelamer carbonate 800 milliGRAM(s) Oral three times a day with meals  simethicone 80 milliGRAM(s) Chew daily PRN  sodium chloride 0.9% lock flush 10 milliLiter(s) IV Push every 1 hour PRN  thiamine 100 milliGRAM(s) Enteral Tube daily                    Physical Therapy Rec:   Home  [  ]   Home w/ PT  [  ]  Rehab  [  ]  Discussed with Cardiothoracic Team at AM rounds.

## 2023-01-20 NOTE — PROGRESS NOTE ADULT - PROBLEM SELECTOR PLAN 1
Will continue  Lantus 20 units qHS  Will continue Pre-meal Admelog to 8 with meals.  With corrective sliding scale.   Will continue monitoring  blood sugars, will Follow up, trend.   Patient counseled for compliance with consistent low carb diet.  Patient agreeable to perform insulin injection administration    Suggest to DC home on current Lantus dose qhs, Tradjenta 5mg PO daily, as long as blood sugar levels stable. Endocrine follow up. Discussed with patient/family.

## 2023-01-21 LAB
ALBUMIN SERPL ELPH-MCNC: 3.3 G/DL — SIGNIFICANT CHANGE UP (ref 3.3–5)
ALP SERPL-CCNC: 76 U/L — SIGNIFICANT CHANGE UP (ref 40–120)
ALT FLD-CCNC: 8 U/L — LOW (ref 10–45)
ANION GAP SERPL CALC-SCNC: 15 MMOL/L — SIGNIFICANT CHANGE UP (ref 5–17)
AST SERPL-CCNC: 17 U/L — SIGNIFICANT CHANGE UP (ref 10–40)
BILIRUB SERPL-MCNC: 0.4 MG/DL — SIGNIFICANT CHANGE UP (ref 0.2–1.2)
BUN SERPL-MCNC: 46 MG/DL — HIGH (ref 7–23)
CALCIUM SERPL-MCNC: 9 MG/DL — SIGNIFICANT CHANGE UP (ref 8.4–10.5)
CHLORIDE SERPL-SCNC: 99 MMOL/L — SIGNIFICANT CHANGE UP (ref 96–108)
CO2 SERPL-SCNC: 22 MMOL/L — SIGNIFICANT CHANGE UP (ref 22–31)
CREAT SERPL-MCNC: 3.08 MG/DL — HIGH (ref 0.5–1.3)
EGFR: 22 ML/MIN/1.73M2 — LOW
GLUCOSE BLDC GLUCOMTR-MCNC: 102 MG/DL — HIGH (ref 70–99)
GLUCOSE BLDC GLUCOMTR-MCNC: 120 MG/DL — HIGH (ref 70–99)
GLUCOSE BLDC GLUCOMTR-MCNC: 123 MG/DL — HIGH (ref 70–99)
GLUCOSE BLDC GLUCOMTR-MCNC: 153 MG/DL — HIGH (ref 70–99)
GLUCOSE BLDC GLUCOMTR-MCNC: 65 MG/DL — LOW (ref 70–99)
GLUCOSE SERPL-MCNC: 115 MG/DL — HIGH (ref 70–99)
HCT VFR BLD CALC: 31.4 % — LOW (ref 39–50)
HGB BLD-MCNC: 9.3 G/DL — LOW (ref 13–17)
MAGNESIUM SERPL-MCNC: 2.4 MG/DL — SIGNIFICANT CHANGE UP (ref 1.6–2.6)
MCHC RBC-ENTMCNC: 29.6 GM/DL — LOW (ref 32–36)
MCHC RBC-ENTMCNC: 30.1 PG — SIGNIFICANT CHANGE UP (ref 27–34)
MCV RBC AUTO: 101.6 FL — HIGH (ref 80–100)
NRBC # BLD: 0 /100 WBCS — SIGNIFICANT CHANGE UP (ref 0–0)
PHOSPHATE SERPL-MCNC: 4.6 MG/DL — HIGH (ref 2.5–4.5)
PLATELET # BLD AUTO: 171 K/UL — SIGNIFICANT CHANGE UP (ref 150–400)
POTASSIUM SERPL-MCNC: 3.6 MMOL/L — SIGNIFICANT CHANGE UP (ref 3.5–5.3)
POTASSIUM SERPL-SCNC: 3.6 MMOL/L — SIGNIFICANT CHANGE UP (ref 3.5–5.3)
PROT SERPL-MCNC: 6.8 G/DL — SIGNIFICANT CHANGE UP (ref 6–8.3)
RBC # BLD: 3.09 M/UL — LOW (ref 4.2–5.8)
RBC # FLD: 19 % — HIGH (ref 10.3–14.5)
SODIUM SERPL-SCNC: 136 MMOL/L — SIGNIFICANT CHANGE UP (ref 135–145)
WBC # BLD: 11.34 K/UL — HIGH (ref 3.8–10.5)
WBC # FLD AUTO: 11.34 K/UL — HIGH (ref 3.8–10.5)

## 2023-01-21 PROCEDURE — 99232 SBSQ HOSP IP/OBS MODERATE 35: CPT | Mod: 24

## 2023-01-21 PROCEDURE — 99233 SBSQ HOSP IP/OBS HIGH 50: CPT

## 2023-01-21 PROCEDURE — 71046 X-RAY EXAM CHEST 2 VIEWS: CPT | Mod: 26

## 2023-01-21 PROCEDURE — 71045 X-RAY EXAM CHEST 1 VIEW: CPT | Mod: 26

## 2023-01-21 RX ORDER — MIDODRINE HYDROCHLORIDE 2.5 MG/1
10 TABLET ORAL EVERY 8 HOURS
Refills: 0 | Status: DISCONTINUED | OUTPATIENT
Start: 2023-01-21 | End: 2023-01-22

## 2023-01-21 RX ORDER — ACETAMINOPHEN 500 MG
1000 TABLET ORAL ONCE
Refills: 0 | Status: COMPLETED | OUTPATIENT
Start: 2023-01-21 | End: 2023-01-21

## 2023-01-21 RX ORDER — OXYCODONE AND ACETAMINOPHEN 5; 325 MG/1; MG/1
1 TABLET ORAL EVERY 4 HOURS
Refills: 0 | Status: DISCONTINUED | OUTPATIENT
Start: 2023-01-21 | End: 2023-01-25

## 2023-01-21 RX ADMIN — Medication 81 MILLIGRAM(S): at 07:47

## 2023-01-21 RX ADMIN — CASPOFUNGIN ACETATE 260 MILLIGRAM(S): 7 INJECTION, POWDER, LYOPHILIZED, FOR SOLUTION INTRAVENOUS at 08:23

## 2023-01-21 RX ADMIN — CHLORHEXIDINE GLUCONATE 1 APPLICATION(S): 213 SOLUTION TOPICAL at 08:14

## 2023-01-21 RX ADMIN — Medication 8 UNIT(S): at 07:38

## 2023-01-21 RX ADMIN — OXYCODONE AND ACETAMINOPHEN 1 TABLET(S): 5; 325 TABLET ORAL at 08:22

## 2023-01-21 RX ADMIN — SEVELAMER CARBONATE 800 MILLIGRAM(S): 2400 POWDER, FOR SUSPENSION ORAL at 11:53

## 2023-01-21 RX ADMIN — Medication 25 MILLIGRAM(S): at 18:28

## 2023-01-21 RX ADMIN — Medication 1000 MILLIGRAM(S): at 14:15

## 2023-01-21 RX ADMIN — Medication 100 MILLIGRAM(S): at 22:26

## 2023-01-21 RX ADMIN — MIDODRINE HYDROCHLORIDE 10 MILLIGRAM(S): 2.5 TABLET ORAL at 22:26

## 2023-01-21 RX ADMIN — Medication 400 MILLIGRAM(S): at 13:31

## 2023-01-21 RX ADMIN — Medication 1 APPLICATION(S): at 13:55

## 2023-01-21 RX ADMIN — SENNA PLUS 2 TABLET(S): 8.6 TABLET ORAL at 22:26

## 2023-01-21 RX ADMIN — INSULIN GLARGINE 20 UNIT(S): 100 INJECTION, SOLUTION SUBCUTANEOUS at 22:29

## 2023-01-21 RX ADMIN — Medication 1: at 11:54

## 2023-01-21 RX ADMIN — MIDODRINE HYDROCHLORIDE 10 MILLIGRAM(S): 2.5 TABLET ORAL at 13:55

## 2023-01-21 RX ADMIN — Medication 100 MILLIGRAM(S): at 13:32

## 2023-01-21 RX ADMIN — Medication 100 MILLIGRAM(S): at 05:47

## 2023-01-21 RX ADMIN — Medication 1 APPLICATION(S): at 22:15

## 2023-01-21 RX ADMIN — SEVELAMER CARBONATE 800 MILLIGRAM(S): 2400 POWDER, FOR SUSPENSION ORAL at 07:41

## 2023-01-21 RX ADMIN — OXYCODONE AND ACETAMINOPHEN 1 TABLET(S): 5; 325 TABLET ORAL at 08:55

## 2023-01-21 RX ADMIN — Medication 25 MILLIGRAM(S): at 05:47

## 2023-01-21 RX ADMIN — Medication 15 MILLILITER(S): at 11:53

## 2023-01-21 RX ADMIN — ATORVASTATIN CALCIUM 80 MILLIGRAM(S): 80 TABLET, FILM COATED ORAL at 22:26

## 2023-01-21 RX ADMIN — SEVELAMER CARBONATE 800 MILLIGRAM(S): 2400 POWDER, FOR SUSPENSION ORAL at 17:28

## 2023-01-21 RX ADMIN — Medication 1 APPLICATION(S): at 06:16

## 2023-01-21 RX ADMIN — SIMETHICONE 80 MILLIGRAM(S): 80 TABLET, CHEWABLE ORAL at 08:25

## 2023-01-21 RX ADMIN — MIDODRINE HYDROCHLORIDE 15 MILLIGRAM(S): 2.5 TABLET ORAL at 05:47

## 2023-01-21 RX ADMIN — PANTOPRAZOLE SODIUM 40 MILLIGRAM(S): 20 TABLET, DELAYED RELEASE ORAL at 05:47

## 2023-01-21 RX ADMIN — Medication 100 MILLIGRAM(S): at 07:47

## 2023-01-21 RX ADMIN — Medication 1 SUPPOSITORY(S): at 11:57

## 2023-01-21 RX ADMIN — Medication 8 UNIT(S): at 11:54

## 2023-01-21 NOTE — PROGRESS NOTE ADULT - SUBJECTIVE AND OBJECTIVE BOX
VITAL SIGNS    Subjective: "I'm feeling ok." Denies CP, palpitation, SOB, BRUNSON, HA, dizziness, N/V/D, fever or chills.  No acute event noted overnight.     Telemetry:  NSR 70-90     Vital Signs Last 24 Hrs  T(C): 36.5 (23 @ 14:50), Max: 36.6 (23 @ 04:19)  T(F): 97.7 (23 @ 14:50), Max: 97.8 (23 @ 04:19)  HR: 81 (23 @ 14:50) (76 - 89)  BP: 105/64 (23 @ 14:50) (97/53 - 118/62)  RR: 18 (23 @ 14:50) (14 - 18)  SpO2: 97% (23 @ 14:50) (97% - 100%)            @ 07:01  -   @ 07:00  --------------------------------------------------------  IN: 520 mL / OUT: 0 mL / NET: 520 mL     @ 07:01  -   @ 16:34  --------------------------------------------------------  IN: 296 mL / OUT: 0 mL / NET: 296 mL    Daily Height in cm: 172.72 (2023 17:23)    Daily Weight in k.9 (2023 14:50)    CAPILLARY BLOOD GLUCOSE    POCT Blood Glucose.: 65 mg/dL (2023 16:25)  POCT Blood Glucose.: 153 mg/dL (2023 11:28)  POCT Blood Glucose.: 123 mg/dL (2023 07:27)  POCT Blood Glucose.: 130 mg/dL (2023 22:04)  POCT Blood Glucose.: 91 mg/dL (2023 21:25)  POCT Blood Glucose.: 90 mg/dL (2023 21:05)        PHYSICAL EXAM    Neurology: alert and oriented x 3, nonfocal, no gross deficits    CV: (+) S1 and S2, No murmurs, rubs, gallops or clicks     Chest: Right HD permacath in place with occlusive dressing --> C/D/I    Lungs: CTA B/L     Abdomen: soft, nontender, nondistended, positive bowel sounds, (+) Flatus; (+) BM     :  Voiding               Extremities:  B/L LE (+) trace edema; negative calf tenderness; (+) 2 DP palpable; Right groin VAC --> negative suction C/D/I. RUE AVF with occlusive dressing --> small amount of sanguinous drainage.       aspirin chewable 81 milliGRAM(s) Oral daily  atorvastatin 80 milliGRAM(s) Oral at bedtime  benzonatate 100 milliGRAM(s) Oral every 8 hours  caspofungin IVPB 50 milliGRAM(s) IV Intermittent every 24 hours  chlorhexidine 2% Cloths 1 Application(s) Topical <User Schedule>  chlorhexidine 4% Liquid 1 Application(s) Topical <User Schedule>  dextrose 50% Injectable 25 Gram(s) IV Push once  dextrose 50% Injectable 12.5 Gram(s) IV Push once  dextrose 50% Injectable 25 Gram(s) IV Push once  dextrose Oral Gel 15 Gram(s) Oral once  epoetin teena-epbx (RETACRIT) Injectable 5000 Unit(s) IV Push <User Schedule>  glucagon  Injectable 1 milliGRAM(s) IntraMuscular once  guaiFENesin Oral Liquid (Sugar-Free) 200 milliGRAM(s) Oral every 6 hours PRN  hydrocortisone 2.5% Rectal Cream 1 Application(s) Rectal three times a day  hydrocortisone hemorrhoidal Suppository 1 Suppository(s) Rectal daily  insulin glargine Injectable (LANTUS) 20 Unit(s) SubCutaneous at bedtime  insulin lispro (ADMELOG) corrective regimen sliding scale SubCutaneous three times a day before meals  insulin lispro (ADMELOG) corrective regimen sliding scale SubCutaneous at bedtime  insulin lispro Injectable (ADMELOG) 8 Unit(s) SubCutaneous three times a day with meals  metoprolol tartrate 25 milliGRAM(s) Oral two times a day  midodrine 10 milliGRAM(s) Oral every 8 hours  multivitamin/minerals/iron Oral Solution (CENTRUM) 15 milliLiter(s) Oral daily  oxycodone 5 mG/acetaminophen 325 mG 1 Tablet(s) Oral every 4 hours PRN  pantoprazole  Tablet 40 milliGRAM(s) Oral before breakfast  polyethylene glycol 3350 17 Gram(s) Oral daily  polyethylene glycol 3350 17 Gram(s) Oral once  psyllium Powder 1 Packet(s) Oral daily  senna 2 Tablet(s) Oral at bedtime  sevelamer carbonate 800 milliGRAM(s) Oral three times a day with meals  simethicone 80 milliGRAM(s) Chew daily PRN  thiamine 100 milliGRAM(s) Oral daily    Physical Therapy Rec:   Home  [  ]   Home w/ PT  [ X ]  Rehab  [  ]    Discussed with Cardiothoracic Team at AM rounds.

## 2023-01-21 NOTE — PROGRESS NOTE ADULT - PROBLEM SELECTOR PLAN 5
continue asa/ statin/ bb and amio 200 qd  HD as per renal  vac changes to rt groin MMF  continue Caspofungin  per Dr. Rodrigues  nutritional optimization; ck prealbumin --> 21  discharge planning- acute rehab after AVF placement

## 2023-01-21 NOTE — PROGRESS NOTE ADULT - ASSESSMENT
61 YO M with a history of CAD s/p 4v CABG ~2019 in [facility], DM2 (A1c 7.3%), and HTN who initially presented to OSH with abdominal pain and n/v and found to have pancreatitis with lipase > 3000 though also with elevated troponins. CT abdomen revealed acute pancreatitis and his initial LVEF was 40-45%. He developed MSOF with hypoxic respiratory failure requiring intubation and ERIC and went into hypoxic PEA arrest requiring ACLS and due to persistent hypoxia and hypotension on pressors after ROSC was cannulated to VA ECMO (LFV, RFA, no anterograde perfusion catheter) on 11/25. Notably CTH that day revealed small subdural hemorrhage.     His post arrest TTE on 11/26 showed a significantly worse LVEF of 5-10% with an AV that was only minimally opening. Since then he has had improvement in his LV function to ~35-40% and improved pulsatility while tolerating progressive slow ECMO weans. ECMO turndown (note in chart 11/30) was reassuring for ability to decannulate to IABP/inotropes. LHC 12/06 showed closure of his 3 vein grafts with graft to LAD patent though with distal native disease. No coronary intervention was done. IABP placed, ECMO successfully decannulated 12/08 (transfused 2u pRBCs during).     His ARDS has improved and he's now s/p trach (decannulated 1/8). He's now off IABP, removed 12/17, and inotropes, however has previously been unable to tolerate GDMT due to soft BPs and renal failure requiring dialysis. 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 He's afebrile with improving leukocytosis. He has urine output but not enough to stay euvolemic. He's tolerating intermittent HD and remains on midodrine for BP support.     11/24 Transfer from [facility] to SICU c/f STEMI, abd US +GB stones, pericholecystic fluid  11/25 VA ECMO s/p hypoxic respiratory arrest/PEA arrest, CTH 4mm subdural, ERIC  12/5 CVVHD, RUQ US neg   12/6 IABP placed in cath lab, LIMA to LAD patent, RCA and LCx down, CTH subdural decreased in size, persistent pancreatic inflammation   12/7 TTE turndown, EF 30-40%, nml RV, MR mod, mod TR  12/8 ECMO decannulation, aflutter DCCV x 2  12/12 Mental status change- CTH no change, CT perfusion/CTA H/N neg for LVO, +low grade watershed infarct to L MCA  12/16 Mitral clip x 1, TRACH 7 Shiley XLT w/ ENT  12/17 IABP dced  12/26 +clinton BCx   12/27 R groin vac  1/8 trach decannulation   1/10 passed FEEST  1/11 R permacath placed by IR  1/13 Transferred to SDU  1/14 VSS; RSR ; hd as per renal MWF; continue vac to rt groin ; nutritional optimization and PT; [facility] sx called for AV fistula placement on this admission (vein mapping completed 12/22) ; continue capsofungin as per Dr. Horton; endo consulted today for dm management- placed on lantus and admelog  1/15 VSS; RSR 70-80; HD as per renal - k 3.8 today; continue nutritional support and pt; prealbumin 21; await date of AVF from [facility] sx--> pt cleared as per cardiac surgeon Dr. Horton- pt must have AVF with cardiac anesthesia-discussed with [facility] sx fellow Rajeev Ascencio; continue vac to rt groin; bs improved on lantus/ admelog   discharge planning- acute rehab when stable   1/16 HD, rt groin vac --> labs stable  1/17 Increase ambulation.   AVF this week, will need cardiac anesthesia   May transfer to floor  Cont capsofungin  1/18 AVF this week with cardiac anesthesia/vascular, cleared by Dr horton.  Remains on capsofungin prophylactically, completed coursse  + cough, HD today , cxr done, clear  Colorectal vizcaino for rectal pain, rectal fissure-- hydrocortisone  1/19 AVF tomorrow.  Additional HD session today and likely Saturday  The patient would like to go home instead of rehab, choosing [facility] dialysis in [place]   1/20 AVF today. Add metamucil rectal fissure pain. Additional HD yesterday, next HD Saturday.  Transfer to floor.  1/21 VVS; Plan for HD today.  Proamatine weaned down to 10 mg PO TID.  Plan to wean down to 5 mg PO TID in AM then D/C on 1/23. CXR PA/LA ordered.       61 YO M with a history of CAD s/p 4v CABG ~2019 in [facility], DM2 (A1c 7.3%), and HTN who initially presented to OSH with abdominal pain and n/v and found to have pancreatitis with lipase > 3000 though also with elevated troponins. CT abdomen revealed acute pancreatitis and his initial LVEF was 40-45%. He developed MSOF with hypoxic respiratory failure requiring intubation and ERIC and went into hypoxic PEA arrest requiring ACLS and due to persistent hypoxia and hypotension on pressors after ROSC was cannulated to VA ECMO (LFV, RFA, no anterograde perfusion catheter) on 11/25. Notably CTH that day revealed small subdural hemorrhage.     His post arrest TTE on 11/26 showed a significantly worse LVEF of 5-10% with an AV that was only minimally opening. Since then he has had improvement in his LV function to ~35-40% and improved pulsatility while tolerating progressive slow ECMO weans. ECMO turndown (note in chart 11/30) was reassuring for ability to decannulate to IABP/inotropes. LHC 12/06 showed closure of his 3 vein grafts with graft to LAD patent though with distal native disease. No coronary intervention was done. IABP placed, ECMO successfully decannulated 12/08 (transfused 2u pRBCs during).     His ARDS has improved and he's now s/p trach (decannulated 1/8). He's now off IABP, removed 12/17, and inotropes, however has previously been unable to tolerate GDMT due to soft BPs and renal failure requiring dialysis. 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 He's afebrile with improving leukocytosis. He has urine output but not enough to stay euvolemic. He's tolerating intermittent HD and remains on midodrine for BP support.     11/24 Transfer from [facility] to SICU c/f STEMI, abd US +GB stones, pericholecystic fluid  11/25 VA ECMO s/p hypoxic respiratory arrest/PEA arrest, CTH 4mm subdural, ERIC  12/5 CVVHD, RUQ US neg   12/6 IABP placed in cath lab, LIMA to LAD patent, RCA and LCx down, CTH subdural decreased in size, persistent pancreatic inflammation   12/7 TTE turndown, EF 30-40%, nml RV, MR mod, mod TR  12/8 ECMO decannulation, aflutter DCCV x 2  12/12 Mental status change- CTH no change, CT perfusion/CTA H/N neg for LVO, +low grade watershed infarct to L MCA  12/16 Mitral clip x 1, TRACH 7 Shiley XLT w/ ENT  12/17 IABP dced  12/26 +clinton BCx   12/27 R groin vac  1/8 trach decannulation   1/10 passed FEEST  1/11 R permacath placed by IR  1/13 Transferred to SDU  1/14 VSS; RSR ; hd as per renal MWF; continue vac to rt groin ; nutritional optimization and PT; [facility] sx called for AV fistula placement on this admission (vein mapping completed 12/22) ; continue capsofungin as per Dr. Horton; endo consulted today for dm management- placed on lantus and admelog  1/15 VSS; RSR 70-80; HD as per renal - k 3.8 today; continue nutritional support and pt; prealbumin 21; await date of AVF from [facility] sx--> pt cleared as per cardiac surgeon Dr. Horton- pt must have AVF with cardiac anesthesia-discussed with [facility] sx fellow Rajeev Ascencio; continue vac to rt groin; bs improved on lantus/ admelog   discharge planning- acute rehab when stable   1/16 HD, rt groin vac --> labs stable  1/17 Increase ambulation.   AVF this week, will need cardiac anesthesia   May transfer to floor  Cont capsofungin  1/18 AVF this week with cardiac anesthesia/vascular, cleared by Dr horton.  Remains on capsofungin prophylactically, completed coursse  + cough, HD today , cxr done, clear  Colorectal vizcaino for rectal pain, rectal fissure-- hydrocortisone  1/19 AVF tomorrow.  Additional HD session today and likely Saturday  The patient would like to go home instead of rehab, choosing [facility] dialysis in [place]   1/20 AVF today. Add metamucil rectal fissure pain. Additional HD yesterday, next HD Saturday.  Transfer to floor.  1/21 VVS; Plan for HD today.  Proamatine weaned down to 10 mg PO TID.  Plan to wean down to 5 mg PO TID in AM then D/C on 1/23. CXR PA/LA ordered.       63 YO M with a history of CAD s/p 4v CABG ~2019 in [facility], DM2 (A1c 7.3%), and HTN who initially presented to OSH with abdominal pain and n/v and found to have pancreatitis with lipase > 3000 though also with elevated troponins. CT abdomen revealed acute pancreatitis and his initial LVEF was 40-45%. He developed MSOF with hypoxic respiratory failure requiring intubation and ERIC and went into hypoxic PEA arrest requiring ACLS and due to persistent hypoxia and hypotension on pressors after ROSC was cannulated to VA ECMO (LFV, RFA, no anterograde perfusion catheter) on 11/25. Notably CTH that day revealed small subdural hemorrhage.     His post arrest TTE on 11/26 showed a significantly worse LVEF of 5-10% with an AV that was only minimally opening. Since then he has had improvement in his LV function to ~35-40% and improved pulsatility while tolerating progressive slow ECMO weans. ECMO turndown (note in chart 11/30) was reassuring for ability to decannulate to IABP/inotropes. LHC 12/06 showed closure of his 3 vein grafts with graft to LAD patent though with distal native disease. No coronary intervention was done. IABP placed, ECMO successfully decannulated 12/08 (transfused 2u pRBCs during).     His ARDS has improved and he's now s/p trach (decannulated 1/8). He's now off IABP, removed 12/17, and inotropes, however has previously been unable to tolerate GDMT due to soft BPs and renal failure requiring dialysis. 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 He's afebrile with improving leukocytosis. He has urine output but not enough to stay euvolemic. He's tolerating intermittent HD and remains on midodrine for BP support.     11/24 Transfer from [facility] to SICU c/f STEMI, abd US +GB stones, pericholecystic fluid  11/25 VA ECMO s/p hypoxic respiratory arrest/PEA arrest, CTH 4mm subdural, ERIC  12/5 CVVHD, RUQ US neg   12/6 IABP placed in cath lab, LIMA to LAD patent, RCA and LCx down, CTH subdural decreased in size, persistent pancreatic inflammation   12/7 TTE turndown, EF 30-40%, nml RV, MR mod, mod TR  12/8 ECMO decannulation, aflutter DCCV x 2  12/12 Mental status change- CTH no change, CT perfusion/CTA H/N neg for LVO, +low grade watershed infarct to L MCA  12/16 Mitral clip x 1, TRACH 7 Shiley XLT w/ ENT  12/17 IABP dced  12/26 +clinton BCx   12/27 R groin vac  1/8 trach decannulation   1/10 passed FEEST  1/11 R permacath placed by IR  1/13 Transferred to SDU  1/14 VSS; RSR ; hd as per renal MWF; continue vac to rt groin ; nutritional optimization and PT; [facility] sx called for AV fistula placement on this admission (vein mapping completed 12/22) ; continue capsofungin as per Dr. Horton; endo consulted today for dm management- placed on lantus and admelog  1/15 VSS; RSR 70-80; HD as per renal - k 3.8 today; continue nutritional support and pt; prealbumin 21; await date of AVF from [facility] sx--> pt cleared as per cardiac surgeon Dr. Horton- pt must have AVF with cardiac anesthesia-discussed with [facility] sx fellow Rajeev Ascencio; continue vac to rt groin; bs improved on lantus/ admelog   discharge planning- acute rehab when stable   1/16 HD, rt groin vac --> labs stable  1/17 Increase ambulation.   AVF this week, will need cardiac anesthesia   May transfer to floor  Cont capsofungin  1/18 AVF this week with cardiac anesthesia/vascular, cleared by Dr horton.  Remains on capsofungin prophylactically, completed coursse  + cough, HD today , cxr done, clear  Colorectal vizcaino for rectal pain, rectal fissure-- hydrocortisone  1/19 AVF tomorrow.  Additional HD session today and likely Saturday  The patient would like to go home instead of rehab, choosing [facility] dialysis in [place]   1/20 AVF today. Add metamucil rectal fissure pain. Additional HD yesterday, next HD Saturday.  Transfer to floor.  1/21 VVS; Plan for HD today.  Proamatine weaned down to 10 mg PO TID.  Plan to wean down to 5 mg PO TID in AM then D/C on 1/23. CXR PA/LA ordered.

## 2023-01-21 NOTE — PROGRESS NOTE ADULT - SUBJECTIVE AND OBJECTIVE BOX
Chief complaint    Patient is a 62y old  Male who presents with a chief complaint of Acute cholecystitis, gallstone pancreatitis (21 Jan 2023 12:08)   Review of systems  Patient in bed, appears comfortable.    Labs and Fingersticks  CAPILLARY BLOOD GLUCOSE      POCT Blood Glucose.: 153 mg/dL (21 Jan 2023 11:28)  POCT Blood Glucose.: 123 mg/dL (21 Jan 2023 07:27)  POCT Blood Glucose.: 130 mg/dL (20 Jan 2023 22:04)  POCT Blood Glucose.: 91 mg/dL (20 Jan 2023 21:25)  POCT Blood Glucose.: 90 mg/dL (20 Jan 2023 21:05)  POCT Blood Glucose.: 98 mg/dL (20 Jan 2023 16:28)      Anion Gap, Serum: 15 (01-21 @ 05:53)  Anion Gap, Serum: 12 (01-20 @ 00:49)      Calcium, Total Serum: 9.0 (01-21 @ 05:53)  Calcium, Total Serum: 8.9 (01-20 @ 00:49)  Albumin, Serum: 3.3 (01-21 @ 05:53)    Alanine Aminotransferase (ALT/SGPT): 8 *L* (01-21 @ 05:53)  Alkaline Phosphatase, Serum: 76 (01-21 @ 05:53)  Aspartate Aminotransferase (AST/SGOT): 17 (01-21 @ 05:53)        01-21    136  |  99  |  46<H>  ----------------------------<  115<H>  3.6   |  22  |  3.08<H>    Ca    9.0      21 Jan 2023 05:53  Phos  4.6     01-21  Mg     2.4     01-21    TPro  6.8  /  Alb  3.3  /  TBili  0.4  /  DBili  x   /  AST  17  /  ALT  8<L>  /  AlkPhos  76  01-21                        9.3    11.34 )-----------( 171      ( 21 Jan 2023 05:52 )             31.4     Medications  MEDICATIONS  (STANDING):  acetaminophen   IVPB .. 1000 milliGRAM(s) IV Intermittent once  aspirin  chewable 81 milliGRAM(s) Oral daily  atorvastatin 80 milliGRAM(s) Oral at bedtime  benzonatate 100 milliGRAM(s) Oral every 8 hours  caspofungin IVPB 50 milliGRAM(s) IV Intermittent every 24 hours  chlorhexidine 2% Cloths 1 Application(s) Topical <User Schedule>  chlorhexidine 4% Liquid 1 Application(s) Topical <User Schedule>  dextrose 50% Injectable 25 Gram(s) IV Push once  dextrose 50% Injectable 12.5 Gram(s) IV Push once  dextrose 50% Injectable 25 Gram(s) IV Push once  dextrose Oral Gel 15 Gram(s) Oral once  epoetin teena-epbx (RETACRIT) Injectable 5000 Unit(s) IV Push <User Schedule>  glucagon  Injectable 1 milliGRAM(s) IntraMuscular once  hydrocortisone 2.5% Rectal Cream 1 Application(s) Rectal three times a day  hydrocortisone hemorrhoidal Suppository 1 Suppository(s) Rectal daily  insulin glargine Injectable (LANTUS) 20 Unit(s) SubCutaneous at bedtime  insulin lispro (ADMELOG) corrective regimen sliding scale   SubCutaneous three times a day before meals  insulin lispro (ADMELOG) corrective regimen sliding scale   SubCutaneous at bedtime  insulin lispro Injectable (ADMELOG) 8 Unit(s) SubCutaneous three times a day with meals  metoprolol tartrate 25 milliGRAM(s) Oral two times a day  midodrine 10 milliGRAM(s) Oral every 8 hours  multivitamin/minerals/iron Oral Solution (CENTRUM) 15 milliLiter(s) Oral daily  pantoprazole    Tablet 40 milliGRAM(s) Oral before breakfast  polyethylene glycol 3350 17 Gram(s) Oral daily  polyethylene glycol 3350 17 Gram(s) Oral once  psyllium Powder 1 Packet(s) Oral daily  senna 2 Tablet(s) Oral at bedtime  sevelamer carbonate 800 milliGRAM(s) Oral three times a day with meals  thiamine 100 milliGRAM(s) Oral daily      Physical Exam  General: Patient appears comfortable.  Vital Signs Last 12 Hrs  T(F): 97.7 (01-21-23 @ 12:43), Max: 97.8 (01-21-23 @ 04:19)  HR: 86 (01-21-23 @ 12:43) (83 - 86)  BP: 103/63 (01-21-23 @ 12:43) (103/63 - 104/67)  BP(mean): --  RR: 18 (01-21-23 @ 12:43) (18 - 18)  SpO2: 100% (01-21-23 @ 12:43) (100% - 100%)  Neck: No palpable thyroid nodules.  CVS: S1S2, No murmurs  Respiratory: No wheezing, no crepitations  GI: Abdomen soft, non tender.  Musculoskeletal:  edema lower extremities.     Diagnostics    Free Thyroxine, Serum: AM Sched. Collection: 15-Teo-2023 06:00 (01-14 @ 14:23)  Thyroid Stimulating Hormone, Serum: AM Sched. Collection: 15-Teo-2023 06:00 (01-14 @ 14:23)

## 2023-01-21 NOTE — PROGRESS NOTE ADULT - ASSESSMENT
Assessment  DMT2: 62y Male with DM T2 A1C 7.3% with hyperglycemia admitted with CAD, patient was on NPH and insulin coverage, sugars improving, no hypoglycemic events, eating meals.  CAD: S/P CABG, on medications, monitored, asymptomatic.  HTN: On antihypertensive medications, monitored, asymptomatic.              Nayeli Quiles MD  Cell:  [phone]  Office: [phone]               Assessment  DMT2: 62y Male with DM T2 A1C 7.3% with hyperglycemia admitted with CAD, patient was on NPH and insulin coverage, sugars improving, no hypoglycemic events, eating meals.  CAD: S/P CABG, on medications, monitored, asymptomatic.  HTN: On antihypertensive medications, monitored, asymptomatic.              Nayeli Quiles MD  Cell:  [phone]  Office: [phone]               Assessment  DMT2: 62y Male with DM T2 A1C 7.3% with hyperglycemia admitted with CAD, patient was on NPH and insulin coverage, sugars improving, no hypoglycemic events, eating meals.  CAD: S/P CABG, on medications, monitored, asymptomatic.  HTN: On antihypertensive medications, monitored, asymptomatic.              Nayeli Quiles MD  Cell:  [phone]  Office: [phone]

## 2023-01-21 NOTE — PROGRESS NOTE ADULT - PROBLEM SELECTOR PLAN 1
Pt with ERIC/ ATN in the setting of STEMI, cardiac arrest & cardiogenic shock. SCr on arrival was 2.15; trended down to hector 1.6 on 11/25; slowly trended up & increased to 3.95 11/28. Pt initiated on CRRT/CVVHDF to optimize volume and electrolytes on 12/5/22. Pt likely with ATN. Pt underwent decannulation of ECMO on 12/8/22 and was taken off CRRT.   Pt reinitiated on CRRT 12/9/22 for volume overload, that was continued until 12/19 & was initiated on iHD thereafter.    s/p RIJ tunneled HD cath on 1/11. Had Last HD session 1/16 with UF that he tolerated well. Pt remains oliguric however reports improving UO. Labs reviewed. Had maintenance HD on 1/18 that he tolerated well and additional session of HD on 1/19, for optimization prior to OR for AVF creation (done on 1/20). Clinically stable, however still remains dialysis dependent. Plan for HD today. Continue to monitor for renal recovery. Monitor labs and urine output. Avoid any potential nephrotoxins.

## 2023-01-21 NOTE — PROGRESS NOTE ADULT - PROBLEM SELECTOR PLAN 1
Will continue current insulin regimen for now. Will continue monitoring  blood sugars, will Follow up.  Patient counseled for compliance with consistent low carb diet.  Patient agreeable to perform insulin injection administration    Suggest to DC home on current Lantus dose qhs, Tradjenta 5mg PO daily, as long as blood sugar levels stable. Endocrine follow up. Discussed with patient/family.

## 2023-01-21 NOTE — PROGRESS NOTE ADULT - SUBJECTIVE AND OBJECTIVE BOX
[facility] DIVISION OF KIDNEY DISEASES AND HYPERTENSION -- PROGRESS NOTE    Chief complaint: ERIC, requiring HD    24 hour events/subjective: had AVF creation yesterday        PAST HISTORY  --------------------------------------------------------------------------------  No significant changes to PMH, PSH, FHx, SHx, unless otherwise noted    ALLERGIES & MEDICATIONS  --------------------------------------------------------------------------------  Allergies    No Known Allergies    Intolerances      Standing Inpatient Medications  aspirin  chewable 81 milliGRAM(s) Oral daily  atorvastatin 80 milliGRAM(s) Oral at bedtime  benzonatate 100 milliGRAM(s) Oral every 8 hours  caspofungin IVPB 50 milliGRAM(s) IV Intermittent every 24 hours  chlorhexidine 2% Cloths 1 Application(s) Topical <User Schedule>  chlorhexidine 4% Liquid 1 Application(s) Topical <User Schedule>  dextrose 50% Injectable 25 Gram(s) IV Push once  dextrose 50% Injectable 12.5 Gram(s) IV Push once  dextrose 50% Injectable 25 Gram(s) IV Push once  dextrose Oral Gel 15 Gram(s) Oral once  epoetin teena-epbx (RETACRIT) Injectable 5000 Unit(s) IV Push <User Schedule>  glucagon  Injectable 1 milliGRAM(s) IntraMuscular once  hydrocortisone 2.5% Rectal Cream 1 Application(s) Rectal three times a day  hydrocortisone hemorrhoidal Suppository 1 Suppository(s) Rectal daily  insulin glargine Injectable (LANTUS) 20 Unit(s) SubCutaneous at bedtime  insulin lispro (ADMELOG) corrective regimen sliding scale   SubCutaneous three times a day before meals  insulin lispro (ADMELOG) corrective regimen sliding scale   SubCutaneous at bedtime  insulin lispro Injectable (ADMELOG) 8 Unit(s) SubCutaneous three times a day with meals  metoprolol tartrate 25 milliGRAM(s) Oral two times a day  midodrine 10 milliGRAM(s) Oral every 8 hours  multivitamin/minerals/iron Oral Solution (CENTRUM) 15 milliLiter(s) Oral daily  pantoprazole    Tablet 40 milliGRAM(s) Oral before breakfast  polyethylene glycol 3350 17 Gram(s) Oral daily  polyethylene glycol 3350 17 Gram(s) Oral once  psyllium Powder 1 Packet(s) Oral daily  senna 2 Tablet(s) Oral at bedtime  sevelamer carbonate 800 milliGRAM(s) Oral three times a day with meals  thiamine 100 milliGRAM(s) Oral daily    PRN Inpatient Medications  guaiFENesin Oral Liquid (Sugar-Free) 200 milliGRAM(s) Oral every 6 hours PRN  oxycodone    5 mG/acetaminophen 325 mG 1 Tablet(s) Oral every 4 hours PRN  simethicone 80 milliGRAM(s) Chew daily PRN      REVIEW OF SYSTEMS  --------------------------------------------------------------------------------  Constitutional: [ ] Fever [ ] Chills [ ] Fatigue [ ] Weight change   HEENT: [ ] Blurred vision [ ] Eye Pain [ ] Headache [ ] Runny nose [ ] Sore Throat   Respiratory: [ ] Cough [ ] Wheezing [ ] Shortness of breath  Cardiovascular: [ ] Chest Pain [ ] Palpitations [ ] BRUNSON [ ] PND [ ] Orthopnea  Gastrointestinal: [ ] Abdominal Pain [ ] Diarrhea [ ] Constipation [ ] Hemorrhoids [ ] Nausea [ ] Vomiting  Genitourinary: [ ] Nocturia [ ] Dysuria [ ] Incontinence  Extremities: [ ] Swelling [ ] Joint Pain  Neurologic: [ ] Focal deficit [ ] Paresthesias [ ] Syncope  Lymphatic: [ ] Swelling [ ] Lymphadenopathy   Skin: [ ] Rash [ ] Ecchymoses [ ] Wounds [ ] Lesions  Psychiatry: [ ] Depression [ ] Suicidal/Homicidal Ideation [ ] Anxiety [ ] Sleep Disturbances  [x ] 10 point review of systems is otherwise negative except as mentioned above              [ ]Unable to obtain due to   All other systems were reviewed and are negative, except as noted.    VITALS/PHYSICAL EXAM  --------------------------------------------------------------------------------  T(C): 36.6 (01-21-23 @ 04:19), Max: 36.6 (01-21-23 @ 04:19)  HR: 83 (01-21-23 @ 04:19) (76 - 89)  BP: 104/67 (01-21-23 @ 04:19) (97/53 - 118/62)  RR: 18 (01-21-23 @ 04:19) (14 - 18)  SpO2: 100% (01-21-23 @ 04:19) (97% - 100%)  Wt(kg): --  Height (cm): 172.7 (01-20-23 @ 17:23)  Weight (kg): 81.3 (01-20-23 @ 17:23)  BMI (kg/m2): 27.3 (01-20-23 @ 17:23)  BSA (m2): 1.95 (01-20-23 @ 17:23)      01-20-23 @ 07:01  -  01-21-23 @ 07:00  --------------------------------------------------------  IN: 520 mL / OUT: 0 mL / NET: 520 mL    01-21-23 @ 07:01  -  01-21-23 @ 12:09  --------------------------------------------------------  IN: 236 mL / OUT: 0 mL / NET: 236 mL      Physical Exam:  	Gen: NAD, well-appearing  	HEENT: on room air  	Pulm: CTA B/L  	CV: normal S1S2; no rub  	Abd: soft                      Back : No sacral edema  	: No michel  	LE: no edema  	Skin: Warm, without rashes  	Vascular access: RIJ tunneled dialysis catheter, RUE AVF in situ    LABS/STUDIES  --------------------------------------------------------------------------------              9.3    11.34 >-----------<  171      [01-21-23 @ 05:52]              31.4     136  |  99  |  46  ----------------------------<  115      [01-21-23 @ 05:53]  3.6   |  22  |  3.08        Ca     9.0     [01-21-23 @ 05:53]      Mg     2.4     [01-21-23 @ 05:53]      Phos  4.6     [01-21-23 @ 05:53]    TPro  6.8  /  Alb  3.3  /  TBili  0.4  /  DBili  x   /  AST  17  /  ALT  8   /  AlkPhos  76  [01-21-23 @ 05:53]    PT/INR: PT 12.0 , INR 1.03       [01-20-23 @ 00:49]  PTT: 26.5       [01-20-23 @ 00:49]      Creatinine Trend:  SCr 3.08 [01-21 @ 05:53]  SCr 2.00 [01-20 @ 00:49]  SCr 3.16 [01-19 @ 09:52]  SCr 4.60 [01-18 @ 06:36]  SCr 4.31 [01-17 @ 05:53]    Urinalysis - [12-26-22 @ 13:19]      Color Yellow / Appearance Clear / SG 1.013 / pH 6.0      Gluc Negative / Ketone Negative  / Bili Negative / Urobili Negative       Blood Large / Protein 100 mg/dL / Leuk Est Moderate / Nitrite Negative      RBC >50 / WBC 35 / Hyaline 1 / Gran  / Sq Epi  / Non Sq Epi 2 / Bacteria Few      Iron 39, TIBC 222, %sat 18      [12-31-22 @ 06:41]  Ferritin 346      [12-31-22 @ 06:42]  TSH 2.42      [01-15-23 @ 06:13]  Lipid: chol --, , HDL --, LDL --      [12-05-22 @ 00:50]    HBsAg Nonreact      [01-04-23 @ 10:02]     [facility] DIVISION OF KIDNEY DISEASES AND HYPERTENSION -- PROGRESS NOTE    Chief complaint: ERIC, requiring HD    24 hour events/subjective: had AVF creation yesterday        PAST HISTORY  --------------------------------------------------------------------------------  No significant changes to PMH, PSH, FHx, SHx, unless otherwise noted    ALLERGIES & MEDICATIONS  --------------------------------------------------------------------------------  Allergies    No Known Allergies    Intolerances      Standing Inpatient Medications  aspirin  chewable 81 milliGRAM(s) Oral daily  atorvastatin 80 milliGRAM(s) Oral at bedtime  benzonatate 100 milliGRAM(s) Oral every 8 hours  caspofungin IVPB 50 milliGRAM(s) IV Intermittent every 24 hours  chlorhexidine 2% Cloths 1 Application(s) Topical <User Schedule>  chlorhexidine 4% Liquid 1 Application(s) Topical <User Schedule>  dextrose 50% Injectable 25 Gram(s) IV Push once  dextrose 50% Injectable 12.5 Gram(s) IV Push once  dextrose 50% Injectable 25 Gram(s) IV Push once  dextrose Oral Gel 15 Gram(s) Oral once  epoetin teena-epbx (RETACRIT) Injectable 5000 Unit(s) IV Push <User Schedule>  glucagon  Injectable 1 milliGRAM(s) IntraMuscular once  hydrocortisone 2.5% Rectal Cream 1 Application(s) Rectal three times a day  hydrocortisone hemorrhoidal Suppository 1 Suppository(s) Rectal daily  insulin glargine Injectable (LANTUS) 20 Unit(s) SubCutaneous at bedtime  insulin lispro (ADMELOG) corrective regimen sliding scale   SubCutaneous three times a day before meals  insulin lispro (ADMELOG) corrective regimen sliding scale   SubCutaneous at bedtime  insulin lispro Injectable (ADMELOG) 8 Unit(s) SubCutaneous three times a day with meals  metoprolol tartrate 25 milliGRAM(s) Oral two times a day  midodrine 10 milliGRAM(s) Oral every 8 hours  multivitamin/minerals/iron Oral Solution (CENTRUM) 15 milliLiter(s) Oral daily  pantoprazole    Tablet 40 milliGRAM(s) Oral before breakfast  polyethylene glycol 3350 17 Gram(s) Oral daily  polyethylene glycol 3350 17 Gram(s) Oral once  psyllium Powder 1 Packet(s) Oral daily  senna 2 Tablet(s) Oral at bedtime  sevelamer carbonate 800 milliGRAM(s) Oral three times a day with meals  thiamine 100 milliGRAM(s) Oral daily    PRN Inpatient Medications  guaiFENesin Oral Liquid (Sugar-Free) 200 milliGRAM(s) Oral every 6 hours PRN  oxycodone    5 mG/acetaminophen 325 mG 1 Tablet(s) Oral every 4 hours PRN  simethicone 80 milliGRAM(s) Chew daily PRN      REVIEW OF SYSTEMS  --------------------------------------------------------------------------------  Constitutional: [ ] Fever [ ] Chills [ ] Fatigue [ ] Weight change   HEENT: [ ] Blurred vision [ ] Eye Pain [ ] Headache [ ] Runny nose [ ] Sore Throat   Respiratory: [ ] Cough [ ] Wheezing [ ] Shortness of breath  Cardiovascular: [ ] Chest Pain [ ] Palpitations [ ] BRUNSON [ ] PND [ ] Orthopnea  Gastrointestinal: [ ] Abdominal Pain [ ] Diarrhea [ ] Constipation [ ] Hemorrhoids [ ] Nausea [ ] Vomiting  Genitourinary: [ ] Nocturia [ ] Dysuria [ ] Incontinence  Extremities: [ ] Swelling [ ] Joint Pain  Neurologic: [ ] Focal deficit [ ] Paresthesias [ ] Syncope  Lymphatic: [ ] Swelling [ ] Lymphadenopathy   Skin: [ ] Rash [ ] Ecchymoses [ ] Wounds [ ] Lesions  Psychiatry: [ ] Depression [ ] Suicidal/Homicidal Ideation [ ] Anxiety [ ] Sleep Disturbances  [x ] 10 point review of systems is otherwise negative except as mentioned above              [ ]Unable to obtain due to   All other systems were reviewed and are negative, except as noted.    VITALS/PHYSICAL EXAM  --------------------------------------------------------------------------------  T(C): 36.6 (01-21-23 @ 04:19), Max: 36.6 (01-21-23 @ 04:19)  HR: 83 (01-21-23 @ 04:19) (76 - 89)  BP: 104/67 (01-21-23 @ 04:19) (97/53 - 118/62)  RR: 18 (01-21-23 @ 04:19) (14 - 18)  SpO2: 100% (01-21-23 @ 04:19) (97% - 100%)  Wt(kg): --  Height (cm): 172.7 (01-20-23 @ 17:23)  Weight (kg): 81.3 (01-20-23 @ 17:23)  BMI (kg/m2): 27.3 (01-20-23 @ 17:23)  BSA (m2): 1.95 (01-20-23 @ 17:23)      01-20-23 @ 07:01  -  01-21-23 @ 07:00  --------------------------------------------------------  IN: 520 mL / OUT: 0 mL / NET: 520 mL    01-21-23 @ 07:01  -  01-21-23 @ 12:09  --------------------------------------------------------  IN: 236 mL / OUT: 0 mL / NET: 236 mL      Physical Exam:  	Gen: NAD, well-appearing  	HEENT: on room air  	Pulm: CTA B/L  	CV: normal S1S2; no rub  	Abd: soft                      Back : No sacral edema  	: No michel  	LE: no edema  	Skin: Warm, without rashes  	Vascular access: RIJ tunneled dialysis catheter, RUE AVF in situ    LABS/STUDIES  --------------------------------------------------------------------------------              9.3    11.34 >-----------<  171      [01-21-23 @ 05:52]              31.4     136  |  99  |  46  ----------------------------<  115      [01-21-23 @ 05:53]  3.6   |  22  |  3.08        Ca     9.0     [01-21-23 @ 05:53]      Mg     2.4     [01-21-23 @ 05:53]      Phos  4.6     [01-21-23 @ 05:53]    TPro  6.8  /  Alb  3.3  /  TBili  0.4  /  DBili  x   /  AST  17  /  ALT  8   /  AlkPhos  76  [01-21-23 @ 05:53]    PT/INR: PT 12.0 , INR 1.03       [01-20-23 @ 00:49]  PTT: 26.5       [01-20-23 @ 00:49]      Creatinine Trend:  SCr 3.08 [01-21 @ 05:53]  SCr 2.00 [01-20 @ 00:49]  SCr 3.16 [01-19 @ 09:52]  SCr 4.60 [01-18 @ 06:36]  SCr 4.31 [01-17 @ 05:53]    Urinalysis - [12-26-22 @ 13:19]      Color Yellow / Appearance Clear / SG 1.013 / pH 6.0      Gluc Negative / Ketone Negative  / Bili Negative / Urobili Negative       Blood Large / Protein 100 mg/dL / Leuk Est Moderate / Nitrite Negative      RBC >50 / WBC 35 / Hyaline 1 / Gran  / Sq Epi  / Non Sq Epi 2 / Bacteria Few      Iron 39, TIBC 222, %sat 18      [12-31-22 @ 06:41]  Ferritin 346      [12-31-22 @ 06:42]  TSH 2.42      [01-15-23 @ 06:13]  Lipid: chol --, , HDL --, LDL --      [12-05-22 @ 00:50]    HBsAg Nonreact      [01-04-23 @ 10:02]     [facility] DIVISION OF KIDNEY DISEASES AND HYPERTENSION -- PROGRESS NOTE    Chief complaint: ERIC, requiring HD    24 hour events/subjective: had AVF creation yesterday        PAST HISTORY  --------------------------------------------------------------------------------  No significant changes to PMH, PSH, FHx, SHx, unless otherwise noted    ALLERGIES & MEDICATIONS  --------------------------------------------------------------------------------  Allergies    No Known Allergies    Intolerances      Standing Inpatient Medications  aspirin  chewable 81 milliGRAM(s) Oral daily  atorvastatin 80 milliGRAM(s) Oral at bedtime  benzonatate 100 milliGRAM(s) Oral every 8 hours  caspofungin IVPB 50 milliGRAM(s) IV Intermittent every 24 hours  chlorhexidine 2% Cloths 1 Application(s) Topical <User Schedule>  chlorhexidine 4% Liquid 1 Application(s) Topical <User Schedule>  dextrose 50% Injectable 25 Gram(s) IV Push once  dextrose 50% Injectable 12.5 Gram(s) IV Push once  dextrose 50% Injectable 25 Gram(s) IV Push once  dextrose Oral Gel 15 Gram(s) Oral once  epoetin teena-epbx (RETACRIT) Injectable 5000 Unit(s) IV Push <User Schedule>  glucagon  Injectable 1 milliGRAM(s) IntraMuscular once  hydrocortisone 2.5% Rectal Cream 1 Application(s) Rectal three times a day  hydrocortisone hemorrhoidal Suppository 1 Suppository(s) Rectal daily  insulin glargine Injectable (LANTUS) 20 Unit(s) SubCutaneous at bedtime  insulin lispro (ADMELOG) corrective regimen sliding scale   SubCutaneous three times a day before meals  insulin lispro (ADMELOG) corrective regimen sliding scale   SubCutaneous at bedtime  insulin lispro Injectable (ADMELOG) 8 Unit(s) SubCutaneous three times a day with meals  metoprolol tartrate 25 milliGRAM(s) Oral two times a day  midodrine 10 milliGRAM(s) Oral every 8 hours  multivitamin/minerals/iron Oral Solution (CENTRUM) 15 milliLiter(s) Oral daily  pantoprazole    Tablet 40 milliGRAM(s) Oral before breakfast  polyethylene glycol 3350 17 Gram(s) Oral daily  polyethylene glycol 3350 17 Gram(s) Oral once  psyllium Powder 1 Packet(s) Oral daily  senna 2 Tablet(s) Oral at bedtime  sevelamer carbonate 800 milliGRAM(s) Oral three times a day with meals  thiamine 100 milliGRAM(s) Oral daily    PRN Inpatient Medications  guaiFENesin Oral Liquid (Sugar-Free) 200 milliGRAM(s) Oral every 6 hours PRN  oxycodone    5 mG/acetaminophen 325 mG 1 Tablet(s) Oral every 4 hours PRN  simethicone 80 milliGRAM(s) Chew daily PRN      REVIEW OF SYSTEMS  --------------------------------------------------------------------------------  Constitutional: [ ] Fever [ ] Chills [ ] Fatigue [ ] Weight change   HEENT: [ ] Blurred vision [ ] Eye Pain [ ] Headache [ ] Runny nose [ ] Sore Throat   Respiratory: [ ] Cough [ ] Wheezing [ ] Shortness of breath  Cardiovascular: [ ] Chest Pain [ ] Palpitations [ ] BRUNSON [ ] PND [ ] Orthopnea  Gastrointestinal: [ ] Abdominal Pain [ ] Diarrhea [ ] Constipation [ ] Hemorrhoids [ ] Nausea [ ] Vomiting  Genitourinary: [ ] Nocturia [ ] Dysuria [ ] Incontinence  Extremities: [ ] Swelling [ ] Joint Pain  Neurologic: [ ] Focal deficit [ ] Paresthesias [ ] Syncope  Lymphatic: [ ] Swelling [ ] Lymphadenopathy   Skin: [ ] Rash [ ] Ecchymoses [ ] Wounds [ ] Lesions  Psychiatry: [ ] Depression [ ] Suicidal/Homicidal Ideation [ ] Anxiety [ ] Sleep Disturbances  [x ] 10 point review of systems is otherwise negative except as mentioned above              [ ]Unable to obtain due to   All other systems were reviewed and are negative, except as noted.    VITALS/PHYSICAL EXAM  --------------------------------------------------------------------------------  T(C): 36.6 (01-21-23 @ 04:19), Max: 36.6 (01-21-23 @ 04:19)  HR: 83 (01-21-23 @ 04:19) (76 - 89)  BP: 104/67 (01-21-23 @ 04:19) (97/53 - 118/62)  RR: 18 (01-21-23 @ 04:19) (14 - 18)  SpO2: 100% (01-21-23 @ 04:19) (97% - 100%)  Wt(kg): --  Height (cm): 172.7 (01-20-23 @ 17:23)  Weight (kg): 81.3 (01-20-23 @ 17:23)  BMI (kg/m2): 27.3 (01-20-23 @ 17:23)  BSA (m2): 1.95 (01-20-23 @ 17:23)      01-20-23 @ 07:01  -  01-21-23 @ 07:00  --------------------------------------------------------  IN: 520 mL / OUT: 0 mL / NET: 520 mL    01-21-23 @ 07:01  -  01-21-23 @ 12:09  --------------------------------------------------------  IN: 236 mL / OUT: 0 mL / NET: 236 mL      Physical Exam:  	Gen: NAD, well-appearing  	HEENT: on room air  	Pulm: CTA B/L  	CV: normal S1S2; no rub  	Abd: soft                      Back : No sacral edema  	: No michel  	LE: no edema  	Skin: Warm, without rashes  	Vascular access: RIJ tunneled dialysis catheter, RUE AVF in situ    LABS/STUDIES  --------------------------------------------------------------------------------              9.3    11.34 >-----------<  171      [01-21-23 @ 05:52]              31.4     136  |  99  |  46  ----------------------------<  115      [01-21-23 @ 05:53]  3.6   |  22  |  3.08        Ca     9.0     [01-21-23 @ 05:53]      Mg     2.4     [01-21-23 @ 05:53]      Phos  4.6     [01-21-23 @ 05:53]    TPro  6.8  /  Alb  3.3  /  TBili  0.4  /  DBili  x   /  AST  17  /  ALT  8   /  AlkPhos  76  [01-21-23 @ 05:53]    PT/INR: PT 12.0 , INR 1.03       [01-20-23 @ 00:49]  PTT: 26.5       [01-20-23 @ 00:49]      Creatinine Trend:  SCr 3.08 [01-21 @ 05:53]  SCr 2.00 [01-20 @ 00:49]  SCr 3.16 [01-19 @ 09:52]  SCr 4.60 [01-18 @ 06:36]  SCr 4.31 [01-17 @ 05:53]    Urinalysis - [12-26-22 @ 13:19]      Color Yellow / Appearance Clear / SG 1.013 / pH 6.0      Gluc Negative / Ketone Negative  / Bili Negative / Urobili Negative       Blood Large / Protein 100 mg/dL / Leuk Est Moderate / Nitrite Negative      RBC >50 / WBC 35 / Hyaline 1 / Gran  / Sq Epi  / Non Sq Epi 2 / Bacteria Few      Iron 39, TIBC 222, %sat 18      [12-31-22 @ 06:41]  Ferritin 346      [12-31-22 @ 06:42]  TSH 2.42      [01-15-23 @ 06:13]  Lipid: chol --, , HDL --, LDL --      [12-05-22 @ 00:50]    HBsAg Nonreact      [01-04-23 @ 10:02]

## 2023-01-21 NOTE — PROGRESS NOTE ADULT - ASSESSMENT
62M w/HTN, DM2, CAD s/p CABG found to have acute gallstone pancreatitis; developed hypoxic respiratory failure requiring intubation later leading to cardiac arrest requiring VA ECMO, now weaned off. Now s/p trach but off vent. Hospital course c/b fungemia and ATN now requiring intermittent HD, now s/p R permacath placement by IR. Patient currently downgraded to the floor. Primary team requesting inpatient LUE AVF. Vein mapping completed 12/22/22. S/p OR right AVF on 1/20.    Plan:  - c/w ASA, SQ heparin  - re-contact vascular surgery as needed    Vascular Surgery  [id number] 62M w/HTN, DM2, CAD s/p CABG found to have acute gallstone pancreatitis; developed hypoxic respiratory failure requiring intubation later leading to cardiac arrest requiring VA ECMO, now weaned off. Now s/p trach but off vent. Hospital course c/b fungemia and ATN now requiring intermittent HD, now s/p R permacath placement by IR. Patient currently downgraded to the floor. Primary team requesting inpatient LUE AVF. Vein mapping completed 12/22/22. S/p OR right AVF on 1/20.    Plan:  - c/w ASA, SQ heparin  - re-contact vascular surgery as needed    Vascular Surgery  [id number] 62M w/HTN, DM2, CAD s/p CABG found to have acute gallstone pancreatitis; developed hypoxic respiratory failure requiring intubation later leading to cardiac arrest requiring VA ECMO, now weaned off. Now s/p trach but off vent. Hospital course c/b fungemia and ATN now requiring intermittent HD, now s/p R permacath placement by IR. Patient currently downgraded to the floor. Primary team requesting inpatient LUE AVF. Vein mapping completed 12/22/22. S/p OR right AVF on 1/20.    Plan:  - c/w ASA, SQ heparin  - re-contact vascular surgery as needed    Vascular Surgery  [id number]

## 2023-01-21 NOTE — PROGRESS NOTE ADULT - SUBJECTIVE AND OBJECTIVE BOX
Vascular Surgery Progress Note    SUBJECTIVE  No acute events overnight. Pt seen and examined on mornings rounds.    OBJECTIVE  ___________________________________________________  VITAL SIGNS / I&O's   Vital Signs Last 24 Hrs  T(C): 36.6 (21 Jan 2023 04:19), Max: 36.6 (21 Jan 2023 04:19)  T(F): 97.8 (21 Jan 2023 04:19), Max: 97.8 (21 Jan 2023 04:19)  HR: 83 (21 Jan 2023 04:19) (76 - 89)  BP: 104/67 (21 Jan 2023 04:19) (97/53 - 118/62)  BP(mean): 77 (21 Jan 2023 00:00) (69 - 84)  RR: 18 (21 Jan 2023 04:19) (14 - 18)  SpO2: 100% (21 Jan 2023 04:19) (97% - 100%)    Parameters below as of 21 Jan 2023 04:19  Patient On (Oxygen Delivery Method): room air          20 Jan 2023 07:01  -  21 Jan 2023 07:00  --------------------------------------------------------  IN:    Oral Fluid: 520 mL  Total IN: 520 mL    OUT:  Total OUT: 0 mL    Total NET: 520 mL      21 Jan 2023 07:01  -  21 Jan 2023 09:09  --------------------------------------------------------  IN:    Oral Fluid: 236 mL  Total IN: 236 mL    OUT:  Total OUT: 0 mL    Total NET: 236 mL        ___________________________________________________  PHYSICAL EXAM    General: Appears well, NAD  CHEST: breathing comfortably  CV: appears well perfused  Abdomen: soft, nontender, nondistended, no rebound or guarding  Extremities: Grossly symmetric, R radial pulse strong, good thrill over fistula    ___________________________________________________  LABS                        9.3    11.34 )-----------( 171      ( 21 Jan 2023 05:52 )             31.4     21 Jan 2023 05:53    136    |  99     |  46     ----------------------------<  115    3.6     |  22     |  3.08     Ca    9.0        21 Jan 2023 05:53  Phos  4.6       21 Jan 2023 05:53  Mg     2.4       21 Jan 2023 05:53    TPro  6.8    /  Alb  3.3    /  TBili  0.4    /  DBili  x      /  AST  17     /  ALT  8      /  AlkPhos  76     21 Jan 2023 05:53    PT/INR - ( 20 Jan 2023 00:49 )   PT: 12.0 sec;   INR: 1.03 ratio         PTT - ( 20 Jan 2023 00:49 )  PTT:26.5 sec  CAPILLARY BLOOD GLUCOSE      POCT Blood Glucose.: 123 mg/dL (21 Jan 2023 07:27)  POCT Blood Glucose.: 130 mg/dL (20 Jan 2023 22:04)  POCT Blood Glucose.: 91 mg/dL (20 Jan 2023 21:25)  POCT Blood Glucose.: 90 mg/dL (20 Jan 2023 21:05)  POCT Blood Glucose.: 98 mg/dL (20 Jan 2023 16:28)  POCT Blood Glucose.: 121 mg/dL (20 Jan 2023 10:58)          ___________________________________________________  MICRO  Recent Cultures:    ___________________________________________________  MEDICATIONS  (STANDING):  aspirin  chewable 81 milliGRAM(s) Oral daily  atorvastatin 80 milliGRAM(s) Oral at bedtime  benzonatate 100 milliGRAM(s) Oral every 8 hours  caspofungin IVPB 50 milliGRAM(s) IV Intermittent every 24 hours  chlorhexidine 2% Cloths 1 Application(s) Topical <User Schedule>  chlorhexidine 4% Liquid 1 Application(s) Topical <User Schedule>  dextrose 50% Injectable 25 Gram(s) IV Push once  dextrose 50% Injectable 12.5 Gram(s) IV Push once  dextrose 50% Injectable 25 Gram(s) IV Push once  dextrose Oral Gel 15 Gram(s) Oral once  epoetin teena-epbx (RETACRIT) Injectable 5000 Unit(s) IV Push <User Schedule>  glucagon  Injectable 1 milliGRAM(s) IntraMuscular once  hydrocortisone 2.5% Rectal Cream 1 Application(s) Rectal three times a day  hydrocortisone hemorrhoidal Suppository 1 Suppository(s) Rectal daily  insulin glargine Injectable (LANTUS) 20 Unit(s) SubCutaneous at bedtime  insulin lispro (ADMELOG) corrective regimen sliding scale   SubCutaneous three times a day before meals  insulin lispro (ADMELOG) corrective regimen sliding scale   SubCutaneous at bedtime  insulin lispro Injectable (ADMELOG) 8 Unit(s) SubCutaneous three times a day with meals  metoprolol tartrate 25 milliGRAM(s) Oral two times a day  midodrine 15 milliGRAM(s) Oral every 8 hours  multivitamin/minerals/iron Oral Solution (CENTRUM) 15 milliLiter(s) Oral daily  pantoprazole    Tablet 40 milliGRAM(s) Oral before breakfast  polyethylene glycol 3350 17 Gram(s) Oral daily  polyethylene glycol 3350 17 Gram(s) Oral once  psyllium Powder 1 Packet(s) Oral daily  senna 2 Tablet(s) Oral at bedtime  sevelamer carbonate 800 milliGRAM(s) Oral three times a day with meals  thiamine 100 milliGRAM(s) Oral daily    MEDICATIONS  (PRN):  guaiFENesin Oral Liquid (Sugar-Free) 200 milliGRAM(s) Oral every 6 hours PRN Cough  oxycodone    5 mG/acetaminophen 325 mG 1 Tablet(s) Oral every 4 hours PRN Mild Pain (1 - 3)  simethicone 80 milliGRAM(s) Chew daily PRN Gas

## 2023-01-21 NOTE — PROGRESS NOTE ADULT - PROBLEM SELECTOR PLAN 3
- was on CVVH now on iHD   - appreciate nephrology recommendations   - avoid nephrotoxins  - s/p RIJ permacath 1/11  - [facility] sx called for AV fistula placement on this admission (vein mapping completed   await date of AVF from [facility] sx--> pt cleared as per cardiac surgeon Dr. Rodrigues- pt must have AVF with cardiac anesthesia-discussed with [facility] sx fellow Rajeev Ascencio - was on CVVH now on iHD   - appreciate nephrology recommendations   - avoid nephrotoxins  - s/p RIJ permacath 1/11  - [facility] sx called for AV fistula placement on this admission (vein mapping completed   await date of AVF from [facility] sx--> pt cleared as per cardiac surgeon Dr. Rodrigues- pt must have AVF with cardiac anesthesia-discussed with [facility] sx fellow Rajeev Ascencio - was on CVVH now on iHD   - appreciate nephrology recommendations   - avoid nephrotoxins  - s/p RIJ permacath 1/11  - [facility] sx called for AV fistula placement on this admission (vein mapping completed   await date of AVF from [facility] sx--> pt cleared as per cardiac surgeon Dr. Rodrigues- pt must have AVF with cardiac anesthesia-discussed with [facility] sx fellow Rajeev Ascencio

## 2023-01-22 LAB
ANION GAP SERPL CALC-SCNC: 15 MMOL/L — SIGNIFICANT CHANGE UP (ref 5–17)
BUN SERPL-MCNC: 33 MG/DL — HIGH (ref 7–23)
CALCIUM SERPL-MCNC: 8.9 MG/DL — SIGNIFICANT CHANGE UP (ref 8.4–10.5)
CHLORIDE SERPL-SCNC: 99 MMOL/L — SIGNIFICANT CHANGE UP (ref 96–108)
CO2 SERPL-SCNC: 22 MMOL/L — SIGNIFICANT CHANGE UP (ref 22–31)
CREAT SERPL-MCNC: 2.55 MG/DL — HIGH (ref 0.5–1.3)
EGFR: 28 ML/MIN/1.73M2 — LOW
GLUCOSE BLDC GLUCOMTR-MCNC: 102 MG/DL — HIGH (ref 70–99)
GLUCOSE BLDC GLUCOMTR-MCNC: 129 MG/DL — HIGH (ref 70–99)
GLUCOSE BLDC GLUCOMTR-MCNC: 139 MG/DL — HIGH (ref 70–99)
GLUCOSE BLDC GLUCOMTR-MCNC: 153 MG/DL — HIGH (ref 70–99)
GLUCOSE SERPL-MCNC: 131 MG/DL — HIGH (ref 70–99)
HCT VFR BLD CALC: 29.1 % — LOW (ref 39–50)
HGB BLD-MCNC: 8.8 G/DL — LOW (ref 13–17)
MCHC RBC-ENTMCNC: 30.2 GM/DL — LOW (ref 32–36)
MCHC RBC-ENTMCNC: 30.2 PG — SIGNIFICANT CHANGE UP (ref 27–34)
MCV RBC AUTO: 100 FL — SIGNIFICANT CHANGE UP (ref 80–100)
NRBC # BLD: 0 /100 WBCS — SIGNIFICANT CHANGE UP (ref 0–0)
PLATELET # BLD AUTO: 145 K/UL — LOW (ref 150–400)
POTASSIUM SERPL-MCNC: 3.5 MMOL/L — SIGNIFICANT CHANGE UP (ref 3.5–5.3)
POTASSIUM SERPL-SCNC: 3.5 MMOL/L — SIGNIFICANT CHANGE UP (ref 3.5–5.3)
RBC # BLD: 2.91 M/UL — LOW (ref 4.2–5.8)
RBC # FLD: 19 % — HIGH (ref 10.3–14.5)
SODIUM SERPL-SCNC: 136 MMOL/L — SIGNIFICANT CHANGE UP (ref 135–145)
WBC # BLD: 13.24 K/UL — HIGH (ref 3.8–10.5)
WBC # FLD AUTO: 13.24 K/UL — HIGH (ref 3.8–10.5)

## 2023-01-22 PROCEDURE — 99231 SBSQ HOSP IP/OBS SF/LOW 25: CPT | Mod: 24

## 2023-01-22 PROCEDURE — 93010 ELECTROCARDIOGRAM REPORT: CPT

## 2023-01-22 RX ORDER — INSULIN LISPRO 100/ML
6 VIAL (ML) SUBCUTANEOUS
Refills: 0 | Status: DISCONTINUED | OUTPATIENT
Start: 2023-01-22 | End: 2023-01-24

## 2023-01-22 RX ORDER — BACLOFEN 100 %
5 POWDER (GRAM) MISCELLANEOUS EVERY 8 HOURS
Refills: 0 | Status: DISCONTINUED | OUTPATIENT
Start: 2023-01-22 | End: 2023-01-25

## 2023-01-22 RX ORDER — MIDODRINE HYDROCHLORIDE 2.5 MG/1
5 TABLET ORAL EVERY 8 HOURS
Refills: 0 | Status: DISCONTINUED | OUTPATIENT
Start: 2023-01-22 | End: 2023-01-23

## 2023-01-22 RX ORDER — INSULIN GLARGINE 100 [IU]/ML
16 INJECTION, SOLUTION SUBCUTANEOUS AT BEDTIME
Refills: 0 | Status: DISCONTINUED | OUTPATIENT
Start: 2023-01-22 | End: 2023-01-24

## 2023-01-22 RX ADMIN — Medication 5 MILLIGRAM(S): at 15:43

## 2023-01-22 RX ADMIN — Medication 1 APPLICATION(S): at 05:55

## 2023-01-22 RX ADMIN — Medication 100 MILLIGRAM(S): at 05:08

## 2023-01-22 RX ADMIN — MIDODRINE HYDROCHLORIDE 5 MILLIGRAM(S): 2.5 TABLET ORAL at 13:26

## 2023-01-22 RX ADMIN — ATORVASTATIN CALCIUM 80 MILLIGRAM(S): 80 TABLET, FILM COATED ORAL at 22:22

## 2023-01-22 RX ADMIN — Medication 1 PACKET(S): at 17:13

## 2023-01-22 RX ADMIN — SEVELAMER CARBONATE 800 MILLIGRAM(S): 2400 POWDER, FOR SUSPENSION ORAL at 16:27

## 2023-01-22 RX ADMIN — Medication 100 MILLIGRAM(S): at 22:22

## 2023-01-22 RX ADMIN — OXYCODONE AND ACETAMINOPHEN 1 TABLET(S): 5; 325 TABLET ORAL at 20:35

## 2023-01-22 RX ADMIN — Medication 81 MILLIGRAM(S): at 07:45

## 2023-01-22 RX ADMIN — SENNA PLUS 2 TABLET(S): 8.6 TABLET ORAL at 22:23

## 2023-01-22 RX ADMIN — Medication 100 MILLIGRAM(S): at 07:46

## 2023-01-22 RX ADMIN — SEVELAMER CARBONATE 800 MILLIGRAM(S): 2400 POWDER, FOR SUSPENSION ORAL at 11:24

## 2023-01-22 RX ADMIN — OXYCODONE AND ACETAMINOPHEN 1 TABLET(S): 5; 325 TABLET ORAL at 06:13

## 2023-01-22 RX ADMIN — OXYCODONE AND ACETAMINOPHEN 1 TABLET(S): 5; 325 TABLET ORAL at 05:28

## 2023-01-22 RX ADMIN — Medication 25 MILLIGRAM(S): at 17:08

## 2023-01-22 RX ADMIN — Medication 5 MILLIGRAM(S): at 22:39

## 2023-01-22 RX ADMIN — SIMETHICONE 80 MILLIGRAM(S): 80 TABLET, CHEWABLE ORAL at 18:09

## 2023-01-22 RX ADMIN — Medication 1 APPLICATION(S): at 10:33

## 2023-01-22 RX ADMIN — Medication 6 UNIT(S): at 07:35

## 2023-01-22 RX ADMIN — POLYETHYLENE GLYCOL 3350 17 GRAM(S): 17 POWDER, FOR SOLUTION ORAL at 17:13

## 2023-01-22 RX ADMIN — MIDODRINE HYDROCHLORIDE 10 MILLIGRAM(S): 2.5 TABLET ORAL at 05:07

## 2023-01-22 RX ADMIN — PANTOPRAZOLE SODIUM 40 MILLIGRAM(S): 20 TABLET, DELAYED RELEASE ORAL at 05:08

## 2023-01-22 RX ADMIN — INSULIN GLARGINE 16 UNIT(S): 100 INJECTION, SOLUTION SUBCUTANEOUS at 22:23

## 2023-01-22 RX ADMIN — Medication 15 MILLILITER(S): at 07:45

## 2023-01-22 RX ADMIN — SEVELAMER CARBONATE 800 MILLIGRAM(S): 2400 POWDER, FOR SUSPENSION ORAL at 07:44

## 2023-01-22 RX ADMIN — Medication 1 APPLICATION(S): at 22:40

## 2023-01-22 RX ADMIN — OXYCODONE AND ACETAMINOPHEN 1 TABLET(S): 5; 325 TABLET ORAL at 19:50

## 2023-01-22 RX ADMIN — Medication 25 MILLIGRAM(S): at 05:08

## 2023-01-22 RX ADMIN — MIDODRINE HYDROCHLORIDE 5 MILLIGRAM(S): 2.5 TABLET ORAL at 22:22

## 2023-01-22 RX ADMIN — Medication 6 UNIT(S): at 16:27

## 2023-01-22 RX ADMIN — Medication 100 MILLIGRAM(S): at 13:26

## 2023-01-22 RX ADMIN — CHLORHEXIDINE GLUCONATE 1 APPLICATION(S): 213 SOLUTION TOPICAL at 10:05

## 2023-01-22 RX ADMIN — Medication 1: at 11:14

## 2023-01-22 RX ADMIN — CASPOFUNGIN ACETATE 260 MILLIGRAM(S): 7 INJECTION, POWDER, LYOPHILIZED, FOR SOLUTION INTRAVENOUS at 07:46

## 2023-01-22 RX ADMIN — Medication 6 UNIT(S): at 11:21

## 2023-01-22 NOTE — PROGRESS NOTE ADULT - SUBJECTIVE AND OBJECTIVE BOX
Subjective " hello I feel ok today "    VITAL SIGNS    Telemetry:   NSR 80    Vital Signs Last 24 Hrs  T(C): 36.9 (23 @ 04:00), Max: 36.9 (23 @ 04:00)  T(F): 98.4 (23 @ 04:00), Max: 98.4 (23 @ 04:00)  HR: 91 (23 @ 04:00) (81 - 94)  BP: 107/67 (23 @ 04:00) (103/63 - 127/79)  RR: 18 (23 @ 04:00) (18 - 18)  SpO2: 94% (23 @ 04:00) (94% - 100%)                @ 07:01  -   @ 07:00  --------------------------------------------------------  IN: 1496 mL / OUT: 2300 mL / NET: -804 mL    Daily Weight in k.2 (2023 08:32)        CAPILLARY BLOOD GLUCOSE      POCT Blood Glucose.: 139 mg/dL (2023 07:28)  POCT Blood Glucose.: 120 mg/dL (2023 21:58)  POCT Blood Glucose.: 102 mg/dL (2023 16:57)  POCT Blood Glucose.: 65 mg/dL (2023 16:25)  POCT Blood Glucose.: 153 mg/dL (2023 11:28)    MEDICATIONS  (STANDING):  aspirin  chewable 81 milliGRAM(s) Oral daily  atorvastatin 80 milliGRAM(s) Oral at bedtime  benzonatate 100 milliGRAM(s) Oral every 8 hours  caspofungin IVPB 50 milliGRAM(s) IV Intermittent every 24 hours  epoetin teena-epbx (RETACRIT) Injectable 5000 Unit(s) IV Push <User Schedule>  glucagon  Injectable 1 milliGRAM(s) IntraMuscular once  hydrocortisone 2.5% Rectal Cream 1 Application(s) Rectal three times a day  hydrocortisone hemorrhoidal Suppository 1 Suppository(s) Rectal daily  insulin glargine Injectable (LANTUS) 16 Unit(s) SubCutaneous at bedtime  insulin lispro (ADMELOG) corrective regimen sliding scale   SubCutaneous three times a day before meals  insulin lispro (ADMELOG) corrective regimen sliding scale   SubCutaneous at bedtime  insulin lispro Injectable (ADMELOG) 6 Unit(s) SubCutaneous three times a day with meals  metoprolol tartrate 25 milliGRAM(s) Oral two times a day  midodrine 10 milliGRAM(s) Oral every 8 hours  multivitamin/minerals/iron Oral Solution (CENTRUM) 15 milliLiter(s) Oral daily  pantoprazole    Tablet 40 milliGRAM(s) Oral before breakfast  polyethylene glycol 3350 17 Gram(s) Oral daily  polyethylene glycol 3350 17 Gram(s) Oral once  psyllium Powder 1 Packet(s) Oral daily  senna 2 Tablet(s) Oral at bedtime  sevelamer carbonate 800 milliGRAM(s) Oral three times a day with meals  thiamine 100 milliGRAM(s) Oral daily    MEDICATIONS  (PRN):  guaiFENesin Oral Liquid (Sugar-Free) 200 milliGRAM(s) Oral every 6 hours PRN Cough  oxycodone    5 mG/acetaminophen 325 mG 1 Tablet(s) Oral every 4 hours PRN Mild Pain (1 - 3)  simethicone 80 milliGRAM(s) Chew daily PRN Gas                                      PHYSICAL EXAM        Neurology: alert and oriented x 3, nonfocal, no gross deficits    CV : [id number]    Lungs: B/l cta on room r     Abdomen: soft, nontender, nondistended, positive bowel sounds, last bowel movement + BM    : HD Via rt ij Permcath new RT AVF not Mature   voids with BM            Extremities: RUE AVF    wram wel lperfused equal strength throughout    B/lle warm + DP no edema                                          Physical Therapy Rec:   Home  [  ]   Home w/ PT  [  ]  Rehab  [x  ]    Discussed with Cardiothoracic Team at AM rounds. Subjective " hello I feel ok today "    VITAL SIGNS    Telemetry:   NSR 80    Vital Signs Last 24 Hrs  T(C): 36.9 (23 @ 04:00), Max: 36.9 (23 @ 04:00)  T(F): 98.4 (23 @ 04:00), Max: 98.4 (23 @ 04:00)  HR: 91 (23 @ 04:00) (81 - 94)  BP: 107/67 (23 @ 04:00) (103/63 - 127/79)  RR: 18 (23 @ 04:00) (18 - 18)  SpO2: 94% (23 @ 04:00) (94% - 100%)                @ 07:01  -   @ 07:00  --------------------------------------------------------  IN: 1496 mL / OUT: 2300 mL / NET: -804 mL    Daily Weight in k.2 (2023 08:32)        CAPILLARY BLOOD GLUCOSE      POCT Blood Glucose.: 139 mg/dL (2023 07:28)  POCT Blood Glucose.: 120 mg/dL (2023 21:58)  POCT Blood Glucose.: 102 mg/dL (2023 16:57)  POCT Blood Glucose.: 65 mg/dL (2023 16:25)  POCT Blood Glucose.: 153 mg/dL (2023 11:28)    MEDICATIONS  (STANDING):  aspirin  chewable 81 milliGRAM(s) Oral daily  atorvastatin 80 milliGRAM(s) Oral at bedtime  benzonatate 100 milliGRAM(s) Oral every 8 hours  caspofungin IVPB 50 milliGRAM(s) IV Intermittent every 24 hours  epoetin teena-epbx (RETACRIT) Injectable 5000 Unit(s) IV Push <User Schedule>  glucagon  Injectable 1 milliGRAM(s) IntraMuscular once  hydrocortisone 2.5% Rectal Cream 1 Application(s) Rectal three times a day  hydrocortisone hemorrhoidal Suppository 1 Suppository(s) Rectal daily  insulin glargine Injectable (LANTUS) 16 Unit(s) SubCutaneous at bedtime  insulin lispro (ADMELOG) corrective regimen sliding scale   SubCutaneous three times a day before meals  insulin lispro (ADMELOG) corrective regimen sliding scale   SubCutaneous at bedtime  insulin lispro Injectable (ADMELOG) 6 Unit(s) SubCutaneous three times a day with meals  metoprolol tartrate 25 milliGRAM(s) Oral two times a day  midodrine 10 milliGRAM(s) Oral every 8 hours  multivitamin/minerals/iron Oral Solution (CENTRUM) 15 milliLiter(s) Oral daily  pantoprazole    Tablet 40 milliGRAM(s) Oral before breakfast  polyethylene glycol 3350 17 Gram(s) Oral daily  polyethylene glycol 3350 17 Gram(s) Oral once  psyllium Powder 1 Packet(s) Oral daily  senna 2 Tablet(s) Oral at bedtime  sevelamer carbonate 800 milliGRAM(s) Oral three times a day with meals  thiamine 100 milliGRAM(s) Oral daily    MEDICATIONS  (PRN):  guaiFENesin Oral Liquid (Sugar-Free) 200 milliGRAM(s) Oral every 6 hours PRN Cough  oxycodone    5 mG/acetaminophen 325 mG 1 Tablet(s) Oral every 4 hours PRN Mild Pain (1 - 3)  simethicone 80 milliGRAM(s) Chew daily PRN Gas                                      PHYSICAL EXAM        Neurology: alert and oriented x 3, nonfocal, no gross deficits    CV : [id number]    Lungs: B/l cta on room r     Abdomen: soft, nontender, nondistended, positive bowel sounds, last bowel movement + BM    : HD Via rt ij Permcath new RT AVF not Mature   voids with BM            Extremities: RUE AVF    wram wel lperfused equal strength throughout    B/lle warm + DP no edema                                          Physical Therapy Rec:   Home  [  ]   Home w/ PT  [  ]  Rehab  [x  ]    Discussed with Cardiothoracic Team at AM rounds. Subjective " hello I feel ok today "    VITAL SIGNS    Telemetry:   NSR 80    Vital Signs Last 24 Hrs  T(C): 36.9 (23 @ 04:00), Max: 36.9 (23 @ 04:00)  T(F): 98.4 (23 @ 04:00), Max: 98.4 (23 @ 04:00)  HR: 91 (23 @ 04:00) (81 - 94)  BP: 107/67 (23 @ 04:00) (103/63 - 127/79)  RR: 18 (23 @ 04:00) (18 - 18)  SpO2: 94% (23 @ 04:00) (94% - 100%)                @ 07:01  -   @ 07:00  --------------------------------------------------------  IN: 1496 mL / OUT: 2300 mL / NET: -804 mL    Daily Weight in k.2 (2023 08:32)        CAPILLARY BLOOD GLUCOSE      POCT Blood Glucose.: 139 mg/dL (2023 07:28)  POCT Blood Glucose.: 120 mg/dL (2023 21:58)  POCT Blood Glucose.: 102 mg/dL (2023 16:57)  POCT Blood Glucose.: 65 mg/dL (2023 16:25)  POCT Blood Glucose.: 153 mg/dL (2023 11:28)    MEDICATIONS  (STANDING):  aspirin  chewable 81 milliGRAM(s) Oral daily  atorvastatin 80 milliGRAM(s) Oral at bedtime  benzonatate 100 milliGRAM(s) Oral every 8 hours  caspofungin IVPB 50 milliGRAM(s) IV Intermittent every 24 hours  epoetin teena-epbx (RETACRIT) Injectable 5000 Unit(s) IV Push <User Schedule>  glucagon  Injectable 1 milliGRAM(s) IntraMuscular once  hydrocortisone 2.5% Rectal Cream 1 Application(s) Rectal three times a day  hydrocortisone hemorrhoidal Suppository 1 Suppository(s) Rectal daily  insulin glargine Injectable (LANTUS) 16 Unit(s) SubCutaneous at bedtime  insulin lispro (ADMELOG) corrective regimen sliding scale   SubCutaneous three times a day before meals  insulin lispro (ADMELOG) corrective regimen sliding scale   SubCutaneous at bedtime  insulin lispro Injectable (ADMELOG) 6 Unit(s) SubCutaneous three times a day with meals  metoprolol tartrate 25 milliGRAM(s) Oral two times a day  midodrine 10 milliGRAM(s) Oral every 8 hours  multivitamin/minerals/iron Oral Solution (CENTRUM) 15 milliLiter(s) Oral daily  pantoprazole    Tablet 40 milliGRAM(s) Oral before breakfast  polyethylene glycol 3350 17 Gram(s) Oral daily  polyethylene glycol 3350 17 Gram(s) Oral once  psyllium Powder 1 Packet(s) Oral daily  senna 2 Tablet(s) Oral at bedtime  sevelamer carbonate 800 milliGRAM(s) Oral three times a day with meals  thiamine 100 milliGRAM(s) Oral daily    MEDICATIONS  (PRN):  guaiFENesin Oral Liquid (Sugar-Free) 200 milliGRAM(s) Oral every 6 hours PRN Cough  oxycodone    5 mG/acetaminophen 325 mG 1 Tablet(s) Oral every 4 hours PRN Mild Pain (1 - 3)  simethicone 80 milliGRAM(s) Chew daily PRN Gas                                      PHYSICAL EXAM        Neurology: alert and oriented x 3, nonfocal, no gross deficits    CV : [id number]    Lungs: B/l cta on room r     Abdomen: soft, nontender, nondistended, positive bowel sounds, last bowel movement + BM    : HD Via rt ij Permcath new RT AVF not Mature   voids with BM            Extremities: RUE AVF    wram wel lperfused equal strength throughout    B/lle warm + DP no edema                                          Physical Therapy Rec:   Home  [  ]   Home w/ PT  [  ]  Rehab  [x  ]    Discussed with Cardiothoracic Team at AM rounds.

## 2023-01-22 NOTE — PROGRESS NOTE ADULT - PROBLEM SELECTOR PLAN 1
Will decrease Lantus to 16 units at bed time.  Will decrease Admelog to 6 units before each meal in addition to Admelog correction scale coverage.    Suggest to DC home on current Lantus dose qhs, Tradjenta 5mg PO daily, as long as blood sugar levels stable. Endocrine follow up. Discussed with patient/family.

## 2023-01-22 NOTE — PROGRESS NOTE ADULT - SUBJECTIVE AND OBJECTIVE BOX
Chief complaint    Patient is a 62y old  Male who presents with a chief complaint of Acute cholecystitis, gallstone pancreatitis (22 Jan 2023 09:31)   Review of systems  Patient in bed, appears comfortable.    Labs and Fingersticks  CAPILLARY BLOOD GLUCOSE      POCT Blood Glucose.: 139 mg/dL (22 Jan 2023 07:28)  POCT Blood Glucose.: 120 mg/dL (21 Jan 2023 21:58)  POCT Blood Glucose.: 102 mg/dL (21 Jan 2023 16:57)  POCT Blood Glucose.: 65 mg/dL (21 Jan 2023 16:25)  POCT Blood Glucose.: 153 mg/dL (21 Jan 2023 11:28)      Anion Gap, Serum: 15 (01-21 @ 05:53)      Calcium, Total Serum: 9.0 (01-21 @ 05:53)  Albumin, Serum: 3.3 (01-21 @ 05:53)    Alanine Aminotransferase (ALT/SGPT): 8 *L* (01-21 @ 05:53)  Alkaline Phosphatase, Serum: 76 (01-21 @ 05:53)  Aspartate Aminotransferase (AST/SGOT): 17 (01-21 @ 05:53)        01-21    136  |  99  |  46<H>  ----------------------------<  115<H>  3.6   |  22  |  3.08<H>    Ca    9.0      21 Jan 2023 05:53  Phos  4.6     01-21  Mg     2.4     01-21    TPro  6.8  /  Alb  3.3  /  TBili  0.4  /  DBili  x   /  AST  17  /  ALT  8<L>  /  AlkPhos  76  01-21                        9.3    11.34 )-----------( 171      ( 21 Jan 2023 05:52 )             31.4     Medications  MEDICATIONS  (STANDING):  aspirin  chewable 81 milliGRAM(s) Oral daily  atorvastatin 80 milliGRAM(s) Oral at bedtime  benzonatate 100 milliGRAM(s) Oral every 8 hours  caspofungin IVPB 50 milliGRAM(s) IV Intermittent every 24 hours  chlorhexidine 2% Cloths 1 Application(s) Topical <User Schedule>  chlorhexidine 4% Liquid 1 Application(s) Topical <User Schedule>  dextrose 50% Injectable 25 Gram(s) IV Push once  dextrose 50% Injectable 12.5 Gram(s) IV Push once  dextrose 50% Injectable 25 Gram(s) IV Push once  dextrose Oral Gel 15 Gram(s) Oral once  epoetin teena-epbx (RETACRIT) Injectable 5000 Unit(s) IV Push <User Schedule>  glucagon  Injectable 1 milliGRAM(s) IntraMuscular once  hydrocortisone 2.5% Rectal Cream 1 Application(s) Rectal three times a day  hydrocortisone hemorrhoidal Suppository 1 Suppository(s) Rectal daily  insulin glargine Injectable (LANTUS) 16 Unit(s) SubCutaneous at bedtime  insulin lispro (ADMELOG) corrective regimen sliding scale   SubCutaneous three times a day before meals  insulin lispro (ADMELOG) corrective regimen sliding scale   SubCutaneous at bedtime  insulin lispro Injectable (ADMELOG) 6 Unit(s) SubCutaneous three times a day with meals  metoprolol tartrate 25 milliGRAM(s) Oral two times a day  midodrine 5 milliGRAM(s) Oral every 8 hours  multivitamin/minerals/iron Oral Solution (CENTRUM) 15 milliLiter(s) Oral daily  pantoprazole    Tablet 40 milliGRAM(s) Oral before breakfast  polyethylene glycol 3350 17 Gram(s) Oral daily  polyethylene glycol 3350 17 Gram(s) Oral once  psyllium Powder 1 Packet(s) Oral daily  senna 2 Tablet(s) Oral at bedtime  sevelamer carbonate 800 milliGRAM(s) Oral three times a day with meals  thiamine 100 milliGRAM(s) Oral daily      Physical Exam  General: Patient appears comfortable.  Vital Signs Last 12 Hrs  T(F): 98.4 (01-22-23 @ 04:00), Max: 98.4 (01-22-23 @ 04:00)  HR: 91 (01-22-23 @ 04:00) (91 - 91)  BP: 107/67 (01-22-23 @ 04:00) (107/67 - 107/67)  BP(mean): --  RR: 18 (01-22-23 @ 04:00) (18 - 18)  SpO2: 94% (01-22-23 @ 04:00) (94% - 94%)  Neck: No palpable thyroid nodules.  CVS: S1S2, No murmurs  Respiratory: No wheezing, no crepitations  GI: Abdomen soft, non tender.  Musculoskeletal:  edema lower extremities.     Diagnostics    Free Thyroxine, Serum: AM Sched. Collection: 15-Teo-2023 06:00 (01-14 @ 14:23)  Thyroid Stimulating Hormone, Serum: AM Sched. Collection: 15-Teo-2023 06:00 (01-14 @ 14:23)

## 2023-01-22 NOTE — PROGRESS NOTE ADULT - ASSESSMENT
Assessment  DMT2: 62y Male with DM T2 A1C 7.3% with hyperglycemia admitted with CAD, patient is on basal bolus insulin coverage, sugars improving, had mild hypoglycemicepisode, eating meals.  CAD: S/P CABG, on medications, monitored, asymptomatic.  HTN: On antihypertensive medications, monitored, asymptomatic.              Nayeli Quiles MD  Cell:  [phone]  Office: [phone]               Assessment  DMT2: 62y Male with DM T2 A1C 7.3% with hyperglycemia admitted with CAD, patient is on basal bolus insulin coverage, sugars improving, had mild hypoglycemicepisode, eating meals.  CAD: S/P CABG, on medications, monitored, asymptomatic.  HTN: On antihypertensive medications, monitored, asymptomatic.              Nayeli Quiles MD  Cell:  [phone]  Office: [phone]               Assessment  DMT2: 62y Male with DM T2 A1C 7.3% with hyperglycemia admitted with CAD, patient is on basal bolus insulin coverage, sugars improving, had mild hypoglycemicepisode, eating meals.  CAD: S/P CABG, on medications, monitored, asymptomatic.  HTN: On antihypertensive medications, monitored, asymptomatic.              Nayeli Quiles MD  Cell:  [phone]  Office: [phone]

## 2023-01-22 NOTE — PROGRESS NOTE ADULT - PROBLEM SELECTOR PLAN 5
continue asa/ statin/ bb and amio 200 qd  HD as per renal  vac changes to rt groin MMF  continue Caspofungin  per Dr. Rodrigues  nutritional optimization; ck prealbumin --> 21  Proamatine 5 mg tid   discharge planning- acute rehab continue asa/ statin/ bb and amio 200 qd  HD as per renal  vac changes to rt groin MMF  continue Caspofungin  per Dr. Rodrigues  nutritional optimization; ck prealbumin --> 21  Proamatine 5 mg tid   discharge planning- acute rehab- family  and patient  desire home

## 2023-01-22 NOTE — PROGRESS NOTE ADULT - PROBLEM SELECTOR PLAN 3
- was on CVVH now on iHD   - appreciate nephrology recommendations   - avoid nephrotoxins  - s/p RIJ permacath 1/11  - 1/20  vasc sx  RUE AV fistula - was on CVVH now on iHD   - appreciate nephrology recommendations   - avoid nephrotoxins  - s/p RIJ permacath 1/11  - 1/20  vasc sx  RUE AV fistula  Hepatitis panel sent    [facility] dialysis referral sent - was on CVVH now on iHD   - appreciate nephrology recommendations   - avoid nephrotoxins  - s/p RIJ permacath 1/11  - 1/20  vasc sx  RUE AV fistula  Hepatitis panel sent    [facility] dialysis referral sent - was on CVVH now on iHD   - appreciate nephrology recommendations   - avoid nephrotoxins  - s/p RIJ permacath 1/11  - 1/20  vasc sx  RUE AV fistula  Hepatitis panel sent    [facility] dialysis referral sent

## 2023-01-22 NOTE — PROGRESS NOTE ADULT - ASSESSMENT
63 YO M with a history of CAD s/p 4v CABG ~2019 in [facility], DM2 (A1c 7.3%), and HTN who initially presented to OSH with abdominal pain and n/v and found to have pancreatitis with lipase > 3000 though also with elevated troponins. CT abdomen revealed acute pancreatitis and his initial LVEF was 40-45%. He developed MSOF with hypoxic respiratory failure requiring intubation and ERIC and went into hypoxic PEA arrest requiring ACLS and due to persistent hypoxia and hypotension on pressors after ROSC was cannulated to [facility] ECMO (LFV, RFA, no anterograde perfusion catheter) on 11/25. Notably CTH that day revealed small subdural hemorrhage.     His post arrest TTE on 11/26 showed a significantly worse LVEF of 5-10% with an AV that was only minimally opening. Since then he has had improvement in his LV function to ~35-40% and improved pulsatility while tolerating progressive slow ECMO weans. ECMO turndown (note in chart 11/30) was reassuring for ability to decannulate to IABP/inotropes. LHC 12/06 showed closure of his 3 vein grafts with graft to LAD patent though with distal native disease. No coronary intervention was done. IABP placed, ECMO successfully decannulated 12/08 (transfused 2u pRBCs during).     His ARDS has improved and he's now s/p trach (decannulated 1/8). He's now off IABP, removed 12/17, and inotropes, however has previously been unable to tolerate GDMT due to soft BPs and renal failure requiring dialysis. 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 He's afebrile with improving leukocytosis. He has urine output but not enough to stay euvolemic. He's tolerating intermittent HD and remains on midodrine for BP support.     11/24 Transfer from [facility] to SICU c/f STEMI, abd US +GB stones, pericholecystic fluid  11/25 VA ECMO s/p hypoxic respiratory arrest/PEA arrest, CTH 4mm subdural, ERIC  12/5 CVVHD, RUQ US neg   12/6 IABP placed in cath lab, LIMA to LAD patent, RCA and LCx down, CTH subdural decreased in size, persistent pancreatic inflammation   12/7 TTE turndown, EF 30-40%, nml RV, MR mod, mod TR  12/8 ECMO decannulation, aflutter DCCV x 2  12/12 Mental status change- CTH no change, CT perfusion/CTA H/N neg for LVO, +low grade watershed infarct to L MCA  12/16 Mitral clip x 1, TRACH 7 Shiley XLT w/ ENT  12/17 IABP dced  12/26 +clinton BCx   12/27 R groin vac  1/8 trach decannulation   1/10 passed FEEST  1/11 R permacath placed by IR  1/13 Transferred to SDU  1/14 VSS; RSR ; hd as per renal MWF; continue vac to rt groin ; nutritional optimization and PT; [facility] sx called for AV fistula placement on this admission (vein mapping completed 12/22) ; continue capsofungin as per Dr. Horton; endo consulted today for dm management- placed on lantus and admelog  1/15 VSS; RSR 70-80; HD as per renal - k 3.8 today; continue nutritional support and pt; prealbumin 21; await date of AVF from [facility] sx--> pt cleared as per cardiac surgeon Dr. Horton- pt must have AVF with cardiac anesthesia-discussed with [facility] sx fellow Rajeev Ascencio; continue vac to rt groin; bs improved on lantus/ admelog   discharge planning- acute rehab when stable   1/16 HD, rt groin vac --> labs stable  1/17 Increase ambulation.   AVF this week, will need cardiac anesthesia   May transfer to floor  Cont capsofungin  1/18 AVF this week with cardiac anesthesia/vascular, cleared by Dr horton.  Remains on capsofungin prophylactically, completed coursse  + cough, HD today , cxr done, clear  Colorectal vizcaino for rectal pain, rectal fissure-- hydrocortisone  1/19 AVF tomorrow.  Additional HD session today and likely Saturday  The patient would like to go home instead of rehab, choosing [facility] dialysis in [place]   1/20 AVF today. Add metamucil rectal fissure pain. Additional HD yesterday, next HD Saturday.  Transfer to floor.  1/21 VVS; Plan for HD today.  Proamatine weaned down to 10 mg PO TID.  Plan to wean down to 5 mg PO TID in AM then D/C on 1/23. CXR PA/LA ordered.    1/22 VSS decrease Proamatine to  5tid today      63 YO M with a history of CAD s/p 4v CABG ~2019 in [facility], DM2 (A1c 7.3%), and HTN who initially presented to OSH with abdominal pain and n/v and found to have pancreatitis with lipase > 3000 though also with elevated troponins. CT abdomen revealed acute pancreatitis and his initial LVEF was 40-45%. He developed MSOF with hypoxic respiratory failure requiring intubation and ERIC and went into hypoxic PEA arrest requiring ACLS and due to persistent hypoxia and hypotension on pressors after ROSC was cannulated to VA ECMO (LFV, RFA, no anterograde perfusion catheter) on 11/25. Notably CTH that day revealed small subdural hemorrhage.     His post arrest TTE on 11/26 showed a significantly worse LVEF of 5-10% with an AV that was only minimally opening. Since then he has had improvement in his LV function to ~35-40% and improved pulsatility while tolerating progressive slow ECMO weans. ECMO turndown (note in chart 11/30) was reassuring for ability to decannulate to IABP/inotropes. LHC 12/06 showed closure of his 3 vein grafts with graft to LAD patent though with distal native disease. No coronary intervention was done. IABP placed, ECMO successfully decannulated 12/08 (transfused 2u pRBCs during).     His ARDS has improved and he's now s/p trach (decannulated 1/8). He's now off IABP, removed 12/17, and inotropes, however has previously been unable to tolerate GDMT due to soft BPs and renal failure requiring dialysis. 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 He's afebrile with improving leukocytosis. He has urine output but not enough to stay euvolemic. He's tolerating intermittent HD and remains on midodrine for BP support.     11/24 Transfer from [facility] to SICU c/f STEMI, abd US +GB stones, pericholecystic fluid  11/25 VA ECMO s/p hypoxic respiratory arrest/PEA arrest, CTH 4mm subdural, ERIC  12/5 CVVHD, RUQ US neg   12/6 IABP placed in cath lab, LIMA to LAD patent, RCA and LCx down, CTH subdural decreased in size, persistent pancreatic inflammation   12/7 TTE turndown, EF 30-40%, nml RV, MR mod, mod TR  12/8 ECMO decannulation, aflutter DCCV x 2  12/12 Mental status change- CTH no change, CT perfusion/CTA H/N neg for LVO, +low grade watershed infarct to L MCA  12/16 Mitral clip x 1, TRACH 7 Shiley XLT w/ ENT  12/17 IABP dced  12/26 +clinton BCx   12/27 R groin vac  1/8 trach decannulation   1/10 passed FEEST  1/11 R permacath placed by IR  1/13 Transferred to SDU  1/14 VSS; RSR ; hd as per renal MWF; continue vac to rt groin ; nutritional optimization and PT; [facility] sx called for AV fistula placement on this admission (vein mapping completed 12/22) ; continue capsofungin as per Dr. Horton; endo consulted today for dm management- placed on lantus and admelog  1/15 VSS; RSR 70-80; HD as per renal - k 3.8 today; continue nutritional support and pt; prealbumin 21; await date of AVF from [facility] sx--> pt cleared as per cardiac surgeon Dr. Horton- pt must have AVF with cardiac anesthesia-discussed with [facility] sx fellow Rajeev Ascencio; continue vac to rt groin; bs improved on lantus/ admelog   discharge planning- acute rehab when stable   1/16 HD, rt groin vac --> labs stable  1/17 Increase ambulation.   AVF this week, will need cardiac anesthesia   May transfer to floor  Cont capsofungin  1/18 AVF this week with cardiac anesthesia/vascular, cleared by Dr horton.  Remains on capsofungin prophylactically, completed coursse  + cough, HD today , cxr done, clear  Colorectal vizcaino for rectal pain, rectal fissure-- hydrocortisone  1/19 AVF tomorrow.  Additional HD session today and likely Saturday  The patient would like to go home instead of rehab, choosing [facility] dialysis in [place]   1/20 AVF today. Add metamucil rectal fissure pain. Additional HD yesterday, next HD Saturday.  Transfer to floor.  1/21 VVS; Plan for HD today.  Proamatine weaned down to 10 mg PO TID.  Plan to wean down to 5 mg PO TID in AM then D/C on 1/23. CXR PA/LA ordered.    1/22 VSS decrease Proamatine to  5tid today      61 YO M with a history of CAD s/p 4v CABG ~2019 in [facility], DM2 (A1c 7.3%), and HTN who initially presented to OSH with abdominal pain and n/v and found to have pancreatitis with lipase > 3000 though also with elevated troponins. CT abdomen revealed acute pancreatitis and his initial LVEF was 40-45%. He developed MSOF with hypoxic respiratory failure requiring intubation and ERIC and went into hypoxic PEA arrest requiring ACLS and due to persistent hypoxia and hypotension on pressors after ROSC was cannulated to VA ECMO (LFV, RFA, no anterograde perfusion catheter) on 11/25. Notably CTH that day revealed small subdural hemorrhage.     His post arrest TTE on 11/26 showed a significantly worse LVEF of 5-10% with an AV that was only minimally opening. Since then he has had improvement in his LV function to ~35-40% and improved pulsatility while tolerating progressive slow ECMO weans. ECMO turndown (note in chart 11/30) was reassuring for ability to decannulate to IABP/inotropes. LHC 12/06 showed closure of his 3 vein grafts with graft to LAD patent though with distal native disease. No coronary intervention was done. IABP placed, ECMO successfully decannulated 12/08 (transfused 2u pRBCs during).     His ARDS has improved and he's now s/p trach (decannulated 1/8). He's now off IABP, removed 12/17, and inotropes, however has previously been unable to tolerate GDMT due to soft BPs and renal failure requiring dialysis. 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 He's afebrile with improving leukocytosis. He has urine output but not enough to stay euvolemic. He's tolerating intermittent HD and remains on midodrine for BP support.     11/24 Transfer from [facility] to SICU c/f STEMI, abd US +GB stones, pericholecystic fluid  11/25 VA ECMO s/p hypoxic respiratory arrest/PEA arrest, CTH 4mm subdural, ERIC  12/5 CVVHD, RUQ US neg   12/6 IABP placed in cath lab, LIMA to LAD patent, RCA and LCx down, CTH subdural decreased in size, persistent pancreatic inflammation   12/7 TTE turndown, EF 30-40%, nml RV, MR mod, mod TR  12/8 ECMO decannulation, aflutter DCCV x 2  12/12 Mental status change- CTH no change, CT perfusion/CTA H/N neg for LVO, +low grade watershed infarct to L MCA  12/16 Mitral clip x 1, TRACH 7 Shiley XLT w/ ENT  12/17 IABP dced  12/26 +clinton BCx   12/27 R groin vac  1/8 trach decannulation   1/10 passed FEEST  1/11 R permacath placed by IR  1/13 Transferred to SDU  1/14 VSS; RSR ; hd as per renal MWF; continue vac to rt groin ; nutritional optimization and PT; [facility] sx called for AV fistula placement on this admission (vein mapping completed 12/22) ; continue capsofungin as per Dr. Horton; endo consulted today for dm management- placed on lantus and admelog  1/15 VSS; RSR 70-80; HD as per renal - k 3.8 today; continue nutritional support and pt; prealbumin 21; await date of AVF from [facility] sx--> pt cleared as per cardiac surgeon Dr. Horton- pt must have AVF with cardiac anesthesia-discussed with [facility] sx fellow Rajeev Ascencio; continue vac to rt groin; bs improved on lantus/ admelog   discharge planning- acute rehab when stable   1/16 HD, rt groin vac --> labs stable  1/17 Increase ambulation.   AVF this week, will need cardiac anesthesia   May transfer to floor  Cont capsofungin  1/18 AVF this week with cardiac anesthesia/vascular, cleared by Dr hotron.  Remains on capsofungin prophylactically, completed coursse  + cough, HD today , cxr done, clear  Colorectal vizcaino for rectal pain, rectal fissure-- hydrocortisone  1/19 AVF tomorrow.  Additional HD session today and likely Saturday  The patient would like to go home instead of rehab, choosing [facility] dialysis in [place]   1/20 AVF today. Add metamucil rectal fissure pain. Additional HD yesterday, next HD Saturday.  Transfer to floor.  1/21 VVS; Plan for HD today.  Proamatine weaned down to 10 mg PO TID.  Plan to wean down to 5 mg PO TID in AM then D/C on 1/23. CXR PA/LA ordered.    1/22 VSS decrease Proamatine to  5tid today

## 2023-01-23 LAB
ALBUMIN SERPL ELPH-MCNC: 3.7 G/DL — SIGNIFICANT CHANGE UP (ref 3.3–5)
ALP SERPL-CCNC: 92 U/L — SIGNIFICANT CHANGE UP (ref 40–120)
ALT FLD-CCNC: 6 U/L — LOW (ref 10–45)
ANION GAP SERPL CALC-SCNC: 18 MMOL/L — HIGH (ref 5–17)
AST SERPL-CCNC: 20 U/L — SIGNIFICANT CHANGE UP (ref 10–40)
BILIRUB SERPL-MCNC: 0.6 MG/DL — SIGNIFICANT CHANGE UP (ref 0.2–1.2)
BUN SERPL-MCNC: 51 MG/DL — HIGH (ref 7–23)
CALCIUM SERPL-MCNC: 9.3 MG/DL — SIGNIFICANT CHANGE UP (ref 8.4–10.5)
CHLORIDE SERPL-SCNC: 94 MMOL/L — LOW (ref 96–108)
CK MB BLD-MCNC: 6.7 % — HIGH (ref 0–3.5)
CK MB CFR SERPL CALC: 2.4 NG/ML — SIGNIFICANT CHANGE UP (ref 0–6.7)
CK SERPL-CCNC: 36 U/L — SIGNIFICANT CHANGE UP (ref 30–200)
CO2 SERPL-SCNC: 22 MMOL/L — SIGNIFICANT CHANGE UP (ref 22–31)
CREAT SERPL-MCNC: 3.09 MG/DL — HIGH (ref 0.5–1.3)
EGFR: 22 ML/MIN/1.73M2 — LOW
GLUCOSE BLDC GLUCOMTR-MCNC: 144 MG/DL — HIGH (ref 70–99)
GLUCOSE BLDC GLUCOMTR-MCNC: 151 MG/DL — HIGH (ref 70–99)
GLUCOSE BLDC GLUCOMTR-MCNC: 163 MG/DL — HIGH (ref 70–99)
GLUCOSE BLDC GLUCOMTR-MCNC: 168 MG/DL — HIGH (ref 70–99)
GLUCOSE SERPL-MCNC: 142 MG/DL — HIGH (ref 70–99)
HCT VFR BLD CALC: 32.9 % — LOW (ref 39–50)
HGB BLD-MCNC: 9.8 G/DL — LOW (ref 13–17)
MCHC RBC-ENTMCNC: 29.8 GM/DL — LOW (ref 32–36)
MCHC RBC-ENTMCNC: 29.8 PG — SIGNIFICANT CHANGE UP (ref 27–34)
MCV RBC AUTO: 100 FL — SIGNIFICANT CHANGE UP (ref 80–100)
NRBC # BLD: 0 /100 WBCS — SIGNIFICANT CHANGE UP (ref 0–0)
PLATELET # BLD AUTO: 183 K/UL — SIGNIFICANT CHANGE UP (ref 150–400)
POTASSIUM SERPL-MCNC: 3.7 MMOL/L — SIGNIFICANT CHANGE UP (ref 3.5–5.3)
POTASSIUM SERPL-SCNC: 3.7 MMOL/L — SIGNIFICANT CHANGE UP (ref 3.5–5.3)
PROT SERPL-MCNC: 7.8 G/DL — SIGNIFICANT CHANGE UP (ref 6–8.3)
RBC # BLD: 3.29 M/UL — LOW (ref 4.2–5.8)
RBC # FLD: 18.6 % — HIGH (ref 10.3–14.5)
SODIUM SERPL-SCNC: 134 MMOL/L — LOW (ref 135–145)
TROPONIN T, HIGH SENSITIVITY RESULT: 85 NG/L — HIGH (ref 0–51)
WBC # BLD: 13.55 K/UL — HIGH (ref 3.8–10.5)
WBC # FLD AUTO: 13.55 K/UL — HIGH (ref 3.8–10.5)

## 2023-01-23 PROCEDURE — 99232 SBSQ HOSP IP/OBS MODERATE 35: CPT

## 2023-01-23 PROCEDURE — 99232 SBSQ HOSP IP/OBS MODERATE 35: CPT | Mod: 24

## 2023-01-23 RX ORDER — METOPROLOL TARTRATE 50 MG
25 TABLET ORAL ONCE
Refills: 0 | Status: COMPLETED | OUTPATIENT
Start: 2023-01-23 | End: 2023-01-23

## 2023-01-23 RX ORDER — METOPROLOL TARTRATE 50 MG
50 TABLET ORAL DAILY
Refills: 0 | Status: DISCONTINUED | OUTPATIENT
Start: 2023-01-24 | End: 2023-01-25

## 2023-01-23 RX ADMIN — Medication 1 PACKET(S): at 11:57

## 2023-01-23 RX ADMIN — Medication 5 MILLIGRAM(S): at 13:11

## 2023-01-23 RX ADMIN — INSULIN GLARGINE 16 UNIT(S): 100 INJECTION, SOLUTION SUBCUTANEOUS at 22:26

## 2023-01-23 RX ADMIN — Medication 15 MILLILITER(S): at 08:37

## 2023-01-23 RX ADMIN — ATORVASTATIN CALCIUM 80 MILLIGRAM(S): 80 TABLET, FILM COATED ORAL at 22:24

## 2023-01-23 RX ADMIN — Medication 100 MILLIGRAM(S): at 09:21

## 2023-01-23 RX ADMIN — SEVELAMER CARBONATE 800 MILLIGRAM(S): 2400 POWDER, FOR SUSPENSION ORAL at 11:58

## 2023-01-23 RX ADMIN — Medication 25 MILLIGRAM(S): at 17:24

## 2023-01-23 RX ADMIN — SENNA PLUS 2 TABLET(S): 8.6 TABLET ORAL at 22:25

## 2023-01-23 RX ADMIN — CASPOFUNGIN ACETATE 260 MILLIGRAM(S): 7 INJECTION, POWDER, LYOPHILIZED, FOR SOLUTION INTRAVENOUS at 09:28

## 2023-01-23 RX ADMIN — Medication 100 MILLIGRAM(S): at 05:47

## 2023-01-23 RX ADMIN — Medication 5 MILLIGRAM(S): at 22:25

## 2023-01-23 RX ADMIN — Medication 1: at 11:47

## 2023-01-23 RX ADMIN — MIDODRINE HYDROCHLORIDE 5 MILLIGRAM(S): 2.5 TABLET ORAL at 05:51

## 2023-01-23 RX ADMIN — OXYCODONE AND ACETAMINOPHEN 1 TABLET(S): 5; 325 TABLET ORAL at 09:20

## 2023-01-23 RX ADMIN — Medication 1: at 16:46

## 2023-01-23 RX ADMIN — Medication 1 APPLICATION(S): at 22:43

## 2023-01-23 RX ADMIN — Medication 6 UNIT(S): at 16:46

## 2023-01-23 RX ADMIN — Medication 5 MILLIGRAM(S): at 05:47

## 2023-01-23 RX ADMIN — CHLORHEXIDINE GLUCONATE 1 APPLICATION(S): 213 SOLUTION TOPICAL at 07:48

## 2023-01-23 RX ADMIN — PANTOPRAZOLE SODIUM 40 MILLIGRAM(S): 20 TABLET, DELAYED RELEASE ORAL at 05:47

## 2023-01-23 RX ADMIN — Medication 81 MILLIGRAM(S): at 08:37

## 2023-01-23 RX ADMIN — Medication 100 MILLIGRAM(S): at 13:11

## 2023-01-23 RX ADMIN — Medication 1 APPLICATION(S): at 05:51

## 2023-01-23 RX ADMIN — Medication 1: at 07:46

## 2023-01-23 RX ADMIN — Medication 25 MILLIGRAM(S): at 05:51

## 2023-01-23 RX ADMIN — SEVELAMER CARBONATE 800 MILLIGRAM(S): 2400 POWDER, FOR SUSPENSION ORAL at 08:21

## 2023-01-23 RX ADMIN — SEVELAMER CARBONATE 800 MILLIGRAM(S): 2400 POWDER, FOR SUSPENSION ORAL at 16:47

## 2023-01-23 RX ADMIN — Medication 100 MILLIGRAM(S): at 22:25

## 2023-01-23 RX ADMIN — Medication 25 MILLIGRAM(S): at 17:54

## 2023-01-23 RX ADMIN — Medication 6 UNIT(S): at 11:48

## 2023-01-23 RX ADMIN — Medication 6 UNIT(S): at 07:47

## 2023-01-23 RX ADMIN — OXYCODONE AND ACETAMINOPHEN 1 TABLET(S): 5; 325 TABLET ORAL at 08:22

## 2023-01-23 NOTE — PROGRESS NOTE ADULT - SUBJECTIVE AND OBJECTIVE BOX
Chief complaint  Patient is a 62y old  Male who presents with a chief complaint of Acute cholecystitis, gallstone pancreatitis (23 Jan 2023 10:55)     Patient in bed, looks comfortable.    Labs and Fingersticks  CAPILLARY BLOOD GLUCOSE      POCT Blood Glucose.: 163 mg/dL (23 Jan 2023 07:19)  POCT Blood Glucose.: 102 mg/dL (22 Jan 2023 21:37)  POCT Blood Glucose.: 129 mg/dL (22 Jan 2023 16:24)      Anion Gap, Serum: 18 *H* (01-23 @ 06:17)  Anion Gap, Serum: 15 (01-22 @ 10:06)      Calcium, Total Serum: 9.3 (01-23 @ 06:17)  Calcium, Total Serum: 8.9 (01-22 @ 10:06)  Albumin, Serum: 3.7 (01-23 @ 06:17)    Alanine Aminotransferase (ALT/SGPT): 6 *L* (01-23 @ 06:17)  Alkaline Phosphatase, Serum: 92 (01-23 @ 06:17)  Aspartate Aminotransferase (AST/SGOT): 20 (01-23 @ 06:17)        01-23    134<L>  |  94<L>  |  51<H>  ----------------------------<  142<H>  3.7   |  22  |  3.09<H>    Ca    9.3      23 Jan 2023 06:17    TPro  7.8  /  Alb  3.7  /  TBili  0.6  /  DBili  x   /  AST  20  /  ALT  6<L>  /  AlkPhos  92  01-23                        9.8    13.55 )-----------( 183      ( 23 Jan 2023 06:17 )             32.9     Medications  MEDICATIONS  (STANDING):  aspirin  chewable 81 milliGRAM(s) Oral daily  atorvastatin 80 milliGRAM(s) Oral at bedtime  baclofen 5 milliGRAM(s) Oral every 8 hours  benzonatate 100 milliGRAM(s) Oral every 8 hours  caspofungin IVPB 50 milliGRAM(s) IV Intermittent every 24 hours  chlorhexidine 2% Cloths 1 Application(s) Topical <User Schedule>  chlorhexidine 4% Liquid 1 Application(s) Topical <User Schedule>  dextrose 50% Injectable 25 Gram(s) IV Push once  dextrose 50% Injectable 12.5 Gram(s) IV Push once  dextrose 50% Injectable 25 Gram(s) IV Push once  dextrose Oral Gel 15 Gram(s) Oral once  epoetin teena-epbx (RETACRIT) Injectable 5000 Unit(s) IV Push <User Schedule>  glucagon  Injectable 1 milliGRAM(s) IntraMuscular once  hydrocortisone 2.5% Rectal Cream 1 Application(s) Rectal three times a day  hydrocortisone hemorrhoidal Suppository 1 Suppository(s) Rectal daily  insulin glargine Injectable (LANTUS) 16 Unit(s) SubCutaneous at bedtime  insulin lispro (ADMELOG) corrective regimen sliding scale   SubCutaneous three times a day before meals  insulin lispro (ADMELOG) corrective regimen sliding scale   SubCutaneous at bedtime  insulin lispro Injectable (ADMELOG) 6 Unit(s) SubCutaneous three times a day with meals  metoprolol tartrate 25 milliGRAM(s) Oral two times a day  multivitamin/minerals/iron Oral Solution (CENTRUM) 15 milliLiter(s) Oral daily  pantoprazole    Tablet 40 milliGRAM(s) Oral before breakfast  polyethylene glycol 3350 17 Gram(s) Oral daily  polyethylene glycol 3350 17 Gram(s) Oral once  psyllium Powder 1 Packet(s) Oral daily  senna 2 Tablet(s) Oral at bedtime  sevelamer carbonate 800 milliGRAM(s) Oral three times a day with meals  thiamine 100 milliGRAM(s) Oral daily      Physical Exam  General: Patient comfortable in bed  Vital Signs Last 12 Hrs  T(F): 98.1 (01-23-23 @ 04:32), Max: 98.1 (01-23-23 @ 04:32)  HR: 88 (01-23-23 @ 04:32) (88 - 88)  BP: 105/64 (01-23-23 @ 04:32) (105/64 - 105/64)  BP(mean): --  RR: 18 (01-23-23 @ 04:32) (18 - 18)  SpO2: 99% (01-23-23 @ 04:32) (99% - 99%)  CVS: S1S2, No murmurs  Respiratory: No wheezing, no crepitations  GI: Abdomen soft, bowel sounds positive  Musculoskeletal:  edema lower extremities.   Skin: No skin rashes, no ecchymosis, midssternal incision                  Chief complaint  Patient is a 62y old  Male who presents with a chief complaint of Acute cholecystitis, gallstone pancreatitis (23 Jan 2023 10:55)     Patient in bed, looks comfortable.    Labs and Fingersticks  CAPILLARY BLOOD GLUCOSE    POCT Blood Glucose.: 163 mg/dL (23 Jan 2023 07:19)  POCT Blood Glucose.: 102 mg/dL (22 Jan 2023 21:37)  POCT Blood Glucose.: 129 mg/dL (22 Jan 2023 16:24)      Anion Gap, Serum: 18 *H* (01-23 @ 06:17)  Anion Gap, Serum: 15 (01-22 @ 10:06)      Calcium, Total Serum: 9.3 (01-23 @ 06:17)  Calcium, Total Serum: 8.9 (01-22 @ 10:06)  Albumin, Serum: 3.7 (01-23 @ 06:17)    Alanine Aminotransferase (ALT/SGPT): 6 *L* (01-23 @ 06:17)  Alkaline Phosphatase, Serum: 92 (01-23 @ 06:17)  Aspartate Aminotransferase (AST/SGOT): 20 (01-23 @ 06:17)        01-23    134<L>  |  94<L>  |  51<H>  ----------------------------<  142<H>  3.7   |  22  |  3.09<H>    Ca    9.3      23 Jan 2023 06:17    TPro  7.8  /  Alb  3.7  /  TBili  0.6  /  DBili  x   /  AST  20  /  ALT  6<L>  /  AlkPhos  92  01-23                        9.8    13.55 )-----------( 183      ( 23 Jan 2023 06:17 )             32.9     Medications  MEDICATIONS  (STANDING):  aspirin  chewable 81 milliGRAM(s) Oral daily  atorvastatin 80 milliGRAM(s) Oral at bedtime  baclofen 5 milliGRAM(s) Oral every 8 hours  benzonatate 100 milliGRAM(s) Oral every 8 hours  caspofungin IVPB 50 milliGRAM(s) IV Intermittent every 24 hours  chlorhexidine 2% Cloths 1 Application(s) Topical <User Schedule>  chlorhexidine 4% Liquid 1 Application(s) Topical <User Schedule>  dextrose 50% Injectable 25 Gram(s) IV Push once  dextrose 50% Injectable 12.5 Gram(s) IV Push once  dextrose 50% Injectable 25 Gram(s) IV Push once  dextrose Oral Gel 15 Gram(s) Oral once  epoetin teena-epbx (RETACRIT) Injectable 5000 Unit(s) IV Push <User Schedule>  glucagon  Injectable 1 milliGRAM(s) IntraMuscular once  hydrocortisone 2.5% Rectal Cream 1 Application(s) Rectal three times a day  hydrocortisone hemorrhoidal Suppository 1 Suppository(s) Rectal daily  insulin glargine Injectable (LANTUS) 16 Unit(s) SubCutaneous at bedtime  insulin lispro (ADMELOG) corrective regimen sliding scale   SubCutaneous three times a day before meals  insulin lispro (ADMELOG) corrective regimen sliding scale   SubCutaneous at bedtime  insulin lispro Injectable (ADMELOG) 6 Unit(s) SubCutaneous three times a day with meals  metoprolol tartrate 25 milliGRAM(s) Oral two times a day  multivitamin/minerals/iron Oral Solution (CENTRUM) 15 milliLiter(s) Oral daily  pantoprazole    Tablet 40 milliGRAM(s) Oral before breakfast  polyethylene glycol 3350 17 Gram(s) Oral daily  polyethylene glycol 3350 17 Gram(s) Oral once  psyllium Powder 1 Packet(s) Oral daily  senna 2 Tablet(s) Oral at bedtime  sevelamer carbonate 800 milliGRAM(s) Oral three times a day with meals  thiamine 100 milliGRAM(s) Oral daily      Physical Exam  General: Patient comfortable in bed  Vital Signs Last 12 Hrs  T(F): 98.1 (01-23-23 @ 04:32), Max: 98.1 (01-23-23 @ 04:32)  HR: 88 (01-23-23 @ 04:32) (88 - 88)  BP: 105/64 (01-23-23 @ 04:32) (105/64 - 105/64)  BP(mean): --  RR: 18 (01-23-23 @ 04:32) (18 - 18)  SpO2: 99% (01-23-23 @ 04:32) (99% - 99%)  CVS: S1S2, No murmurs  Respiratory: No wheezing, no crepitations  GI: Abdomen soft, bowel sounds positive  Musculoskeletal:  edema lower extremities.   Skin: No skin rashes, no ecchymosis, midssternal incision

## 2023-01-23 NOTE — PROGRESS NOTE ADULT - ASSESSMENT
63 YO M with a history of CAD s/p 4v CABG ~2019 in [facility], DM2 (A1c 7.3%), and HTN who initially presented to OSH with abdominal pain and n/v and found to have pancreatitis with lipase > 3000 though also with elevated troponins. CT abdomen revealed acute pancreatitis and his initial LVEF was 40-45%. He developed MSOF with hypoxic respiratory failure requiring intubation and ERIC and went into hypoxic PEA arrest requiring ACLS and due to persistent hypoxia and hypotension on pressors after ROSC was cannulated to [facility] ECMO (LFV, RFA, no anterograde perfusion catheter) on 11/25. Notably CTH that day revealed small subdural hemorrhage.     His post arrest TTE on 11/26 showed a significantly worse LVEF of 5-10% with an AV that was only minimally opening. Since then he has had improvement in his LV function to ~35-40% and improved pulsatility while tolerating progressive slow ECMO weans. ECMO turndown (note in chart 11/30) was reassuring for ability to decannulate to IABP/inotropes. C 12/06 showed closure of his 3 vein grafts with graft to LAD patent though with distal native disease. No coronary intervention was done. IABP placed, ECMO successfully decannulated 12/08 (transfused 2u pRBCs during).     His ARDS has improved and he's now s/p trach (decannulated 1/8). He's now off IABP, removed 12/17, and inotropes, however has previously been unable to tolerate GDMT due to soft BPs and renal failure requiring dialysis. 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 He is overall progressing well and tolerating iHD. Has been weaned off Midodrine and will introduce GDMT as his pressure allows as outpatient. Plan for discharge home today.          Previous cardiac studies:  TTE 1/9/23 - EF 25%, mild MR, min AI, no TR, no obvious valvular vegetations.  TTE 12/19/22 - EF 24%, LVIDd 5.6, normal RV function, estimated PASP 45mmhg  [facility] (12/06/22) - patent LIMA-LAD, RCA , LCx , aortogram w/ closure of 3 vein grafts, LVEDP 22 mm Hg, left-to-left and left-to-right collaterals  TTE (12/03/22) - mod-to-sev segmental LVSD (inferolateral, inferior, inferoseptal hypokinesis) 61 YO M with a history of CAD s/p 4v CABG ~2019 in [facility], DM2 (A1c 7.3%), and HTN who initially presented to OSH with abdominal pain and n/v and found to have pancreatitis with lipase > 3000 though also with elevated troponins. CT abdomen revealed acute pancreatitis and his initial LVEF was 40-45%. He developed MSOF with hypoxic respiratory failure requiring intubation and ERIC and went into hypoxic PEA arrest requiring ACLS and due to persistent hypoxia and hypotension on pressors after ROSC was cannulated to [facility] ECMO (LFV, RFA, no anterograde perfusion catheter) on 11/25. Notably CTH that day revealed small subdural hemorrhage.     His post arrest TTE on 11/26 showed a significantly worse LVEF of 5-10% with an AV that was only minimally opening. Since then he has had improvement in his LV function to ~35-40% and improved pulsatility while tolerating progressive slow ECMO weans. ECMO turndown (note in chart 11/30) was reassuring for ability to decannulate to IABP/inotropes. C 12/06 showed closure of his 3 vein grafts with graft to LAD patent though with distal native disease. No coronary intervention was done. IABP placed, ECMO successfully decannulated 12/08 (transfused 2u pRBCs during).     His ARDS has improved and he's now s/p trach (decannulated 1/8). He's now off IABP, removed 12/17, and inotropes, however has previously been unable to tolerate GDMT due to soft BPs and renal failure requiring dialysis. 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 He is overall progressing well and tolerating iHD. Has been weaned off Midodrine and will introduce GDMT as his pressure allows as outpatient. Plan for discharge home today.          Previous cardiac studies:  TTE 1/9/23 - EF 25%, mild MR, min AI, no TR, no obvious valvular vegetations.  TTE 12/19/22 - EF 24%, LVIDd 5.6, normal RV function, estimated PASP 45mmhg  [facility] (12/06/22) - patent LIMA-LAD, RCA , LCx , aortogram w/ closure of 3 vein grafts, LVEDP 22 mm Hg, left-to-left and left-to-right collaterals  TTE (12/03/22) - mod-to-sev segmental LVSD (inferolateral, inferior, inferoseptal hypokinesis) 63 YO M with a history of CAD s/p 4v CABG ~2019 in [facility], DM2 (A1c 7.3%), and HTN who initially presented to OSH with abdominal pain and n/v and found to have pancreatitis with lipase > 3000 though also with elevated troponins. CT abdomen revealed acute pancreatitis and his initial LVEF was 40-45%. He developed MSOF with hypoxic respiratory failure requiring intubation and ERIC and went into hypoxic PEA arrest requiring ACLS and due to persistent hypoxia and hypotension on pressors after ROSC was cannulated to [facility] ECMO (LFV, RFA, no anterograde perfusion catheter) on 11/25. Notably CTH that day revealed small subdural hemorrhage.     His post arrest TTE on 11/26 showed a significantly worse LVEF of 5-10% with an AV that was only minimally opening. Since then he has had improvement in his LV function to ~35-40% and improved pulsatility while tolerating progressive slow ECMO weans. ECMO turndown (note in chart 11/30) was reassuring for ability to decannulate to IABP/inotropes. C 12/06 showed closure of his 3 vein grafts with graft to LAD patent though with distal native disease. No coronary intervention was done. IABP placed, ECMO successfully decannulated 12/08 (transfused 2u pRBCs during).     His ARDS has improved and he's now s/p trach (decannulated 1/8). He's now off IABP, removed 12/17, and inotropes, however has previously been unable to tolerate GDMT due to soft BPs and renal failure requiring dialysis. 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 He is overall progressing well and tolerating iHD. Has been weaned off Midodrine and will introduce GDMT as his pressure allows as outpatient. Plan for discharge home today.          Previous cardiac studies:  TTE 1/9/23 - EF 25%, mild MR, min AI, no TR, no obvious valvular vegetations.  TTE 12/19/22 - EF 24%, LVIDd 5.6, normal RV function, estimated PASP 45mmhg  [facility] (12/06/22) - patent LIMA-LAD, RCA , LCx , aortogram w/ closure of 3 vein grafts, LVEDP 22 mm Hg, left-to-left and left-to-right collaterals  TTE (12/03/22) - mod-to-sev segmental LVSD (inferolateral, inferior, inferoseptal hypokinesis)

## 2023-01-23 NOTE — PROGRESS NOTE ADULT - SUBJECTIVE AND OBJECTIVE BOX
ADVANCED HEART FAILURE & TRANSPLANT- PROGRESS NOTE  *To reach the NS1 Team from 8am to 5pm, please call [phone].  ___________________________________________________________________________    Subjective:  - plan for discharge today  - remains hemodynamically stable     Medications:  aspirin  chewable 81 milliGRAM(s) Oral daily  atorvastatin 80 milliGRAM(s) Oral at bedtime  baclofen 5 milliGRAM(s) Oral every 8 hours  benzonatate 100 milliGRAM(s) Oral every 8 hours  caspofungin IVPB 50 milliGRAM(s) IV Intermittent every 24 hours  chlorhexidine 2% Cloths 1 Application(s) Topical <User Schedule>  chlorhexidine 4% Liquid 1 Application(s) Topical <User Schedule>  dextrose 50% Injectable 25 Gram(s) IV Push once  dextrose 50% Injectable 12.5 Gram(s) IV Push once  dextrose 50% Injectable 25 Gram(s) IV Push once  dextrose Oral Gel 15 Gram(s) Oral once  epoetin teena-epbx (RETACRIT) Injectable 5000 Unit(s) IV Push <User Schedule>  glucagon  Injectable 1 milliGRAM(s) IntraMuscular once  guaiFENesin Oral Liquid (Sugar-Free) 200 milliGRAM(s) Oral every 6 hours PRN  hydrocortisone 2.5% Rectal Cream 1 Application(s) Rectal three times a day  hydrocortisone hemorrhoidal Suppository 1 Suppository(s) Rectal daily  insulin glargine Injectable (LANTUS) 16 Unit(s) SubCutaneous at bedtime  insulin lispro (ADMELOG) corrective regimen sliding scale   SubCutaneous three times a day before meals  insulin lispro (ADMELOG) corrective regimen sliding scale   SubCutaneous at bedtime  insulin lispro Injectable (ADMELOG) 6 Unit(s) SubCutaneous three times a day with meals  metoprolol tartrate 25 milliGRAM(s) Oral two times a day  multivitamin/minerals/iron Oral Solution (CENTRUM) 15 milliLiter(s) Oral daily  oxycodone    5 mG/acetaminophen 325 mG 1 Tablet(s) Oral every 4 hours PRN  pantoprazole    Tablet 40 milliGRAM(s) Oral before breakfast  polyethylene glycol 3350 17 Gram(s) Oral daily  polyethylene glycol 3350 17 Gram(s) Oral once  psyllium Powder 1 Packet(s) Oral daily  senna 2 Tablet(s) Oral at bedtime  sevelamer carbonate 800 milliGRAM(s) Oral three times a day with meals  simethicone 80 milliGRAM(s) Chew daily PRN  thiamine 100 milliGRAM(s) Oral daily        Vitals:  Vital Signs Last 24 Hours  T(C): 36.5 (01-23-23 @ 12:31), Max: 36.7 (01-22-23 @ 20:15)  HR: 97 (01-23-23 @ 12:31) (83 - 97)  BP: 121/75 (01-23-23 @ 12:31) (103/63 - 121/75)  RR: 18 (01-23-23 @ 12:31) (18 - 18)  SpO2: 100% (01-23-23 @ 12:31) (91% - 100%)        I&O's Summary    22 Jan 2023 07:01  -  23 Jan 2023 07:00  --------------------------------------------------------  IN: 820 mL / OUT: 200 mL / NET: 620 mL    23 Jan 2023 07:01  -  23 Jan 2023 14:28  --------------------------------------------------------  IN: 370 mL / OUT: 0 mL / NET: 370 mL        Physical Exam:  General: No distress. Comfortable.  Neck: JVP ~12   Chest: Clear to auscultation bilaterally  CV: Normal S1 and S2. No murmurs, rub, or gallops. Radial pulses normal.  Abdomen: Soft, non-distended, non-tender  Extremities: warm and well perfused   Neurology: Alert and oriented times three.    Labs:                        9.8    13.55 )-----------( 183      ( 23 Jan 2023 06:17 )             32.9     01-23    134<L>  |  94<L>  |  51<H>  ----------------------------<  142<H>  3.7   |  22  |  3.09<H>    Ca    9.3      23 Jan 2023 06:17    TPro  7.8  /  Alb  3.7  /  TBili  0.6  /  DBili  x   /  AST  20  /  ALT  6<L>  /  AlkPhos  92  01-23      CARDIAC MARKERS ( 23 Jan 2023 06:17 )  x     / x     / 36 U/L / x     / 2.4 ng/mL      Creatine Kinase, Serum: 36 U/L (01-23-23 @ 06:17)  Creatine Kinase, Serum: 36 U/L (01-23-23 @ 06:17)         ADVANCED HEART FAILURE & TRANSPLANT- PROGRESS NOTE  *To reach the NS1 Team from 8am to 5pm, please call [phone].  ___________________________________________________________________________    Subjective:  - plan for discharge today  - remains hemodynamically stable     Medications:  aspirin  chewable 81 milliGRAM(s) Oral daily  atorvastatin 80 milliGRAM(s) Oral at bedtime  baclofen 5 milliGRAM(s) Oral every 8 hours  benzonatate 100 milliGRAM(s) Oral every 8 hours  caspofungin IVPB 50 milliGRAM(s) IV Intermittent every 24 hours  chlorhexidine 2% Cloths 1 Application(s) Topical <User Schedule>  chlorhexidine 4% Liquid 1 Application(s) Topical <User Schedule>  dextrose 50% Injectable 25 Gram(s) IV Push once  dextrose 50% Injectable 12.5 Gram(s) IV Push once  dextrose 50% Injectable 25 Gram(s) IV Push once  dextrose Oral Gel 15 Gram(s) Oral once  epoetin teena-epbx (RETACRIT) Injectable 5000 Unit(s) IV Push <User Schedule>  glucagon  Injectable 1 milliGRAM(s) IntraMuscular once  guaiFENesin Oral Liquid (Sugar-Free) 200 milliGRAM(s) Oral every 6 hours PRN  hydrocortisone 2.5% Rectal Cream 1 Application(s) Rectal three times a day  hydrocortisone hemorrhoidal Suppository 1 Suppository(s) Rectal daily  insulin glargine Injectable (LANTUS) 16 Unit(s) SubCutaneous at bedtime  insulin lispro (ADMELOG) corrective regimen sliding scale   SubCutaneous three times a day before meals  insulin lispro (ADMELOG) corrective regimen sliding scale   SubCutaneous at bedtime  insulin lispro Injectable (ADMELOG) 6 Unit(s) SubCutaneous three times a day with meals  metoprolol tartrate 25 milliGRAM(s) Oral two times a day  multivitamin/minerals/iron Oral Solution (CENTRUM) 15 milliLiter(s) Oral daily  oxycodone    5 mG/acetaminophen 325 mG 1 Tablet(s) Oral every 4 hours PRN  pantoprazole    Tablet 40 milliGRAM(s) Oral before breakfast  polyethylene glycol 3350 17 Gram(s) Oral daily  polyethylene glycol 3350 17 Gram(s) Oral once  psyllium Powder 1 Packet(s) Oral daily  senna 2 Tablet(s) Oral at bedtime  sevelamer carbonate 800 milliGRAM(s) Oral three times a day with meals  simethicone 80 milliGRAM(s) Chew daily PRN  thiamine 100 milliGRAM(s) Oral daily        Vitals:  Vital Signs Last 24 Hours  T(C): 36.5 (01-23-23 @ 12:31), Max: 36.7 (01-22-23 @ 20:15)  HR: 97 (01-23-23 @ 12:31) (83 - 97)  BP: 121/75 (01-23-23 @ 12:31) (103/63 - 121/75)  RR: 18 (01-23-23 @ 12:31) (18 - 18)  SpO2: 100% (01-23-23 @ 12:31) (91% - 100%)        I&O's Summary    22 Jan 2023 07:01  -  23 Jan 2023 07:00  --------------------------------------------------------  IN: 820 mL / OUT: 200 mL / NET: 620 mL    23 Jan 2023 07:01  -  23 Jan 2023 14:28  --------------------------------------------------------  IN: 370 mL / OUT: 0 mL / NET: 370 mL        Physical Exam:  General: No distress. Comfortable.  Neck: JVP ~12   Chest: Clear to auscultation bilaterally  CV: Normal S1 and S2. No murmurs, rub, or gallops. Radial pulses normal.  Abdomen: Soft, non-distended, non-tender  Extremities: warm and well perfused   Neurology: Alert and oriented times three.    Labs:                        9.8    13.55 )-----------( 183      ( 23 Jan 2023 06:17 )             32.9     01-23    134<L>  |  94<L>  |  51<H>  ----------------------------<  142<H>  3.7   |  22  |  3.09<H>    Ca    9.3      23 Jan 2023 06:17    TPro  7.8  /  Alb  3.7  /  TBili  0.6  /  DBili  x   /  AST  20  /  ALT  6<L>  /  AlkPhos  92  01-23      CARDIAC MARKERS ( 23 Jan 2023 06:17 )  x     / x     / 36 U/L / x     / 2.4 ng/mL      Creatine Kinase, Serum: 36 U/L (01-23-23 @ 06:17)  Creatine Kinase, Serum: 36 U/L (01-23-23 @ 06:17)         ADVANCED HEART FAILURE & TRANSPLANT- PROGRESS NOTE  *To reach the NS1 Team from 8am to 5pm, please call [phone].  ___________________________________________________________________________    Subjective:  - plan for discharge today  - remains hemodynamically stable     Medications:  aspirin  chewable 81 milliGRAM(s) Oral daily  atorvastatin 80 milliGRAM(s) Oral at bedtime  baclofen 5 milliGRAM(s) Oral every 8 hours  benzonatate 100 milliGRAM(s) Oral every 8 hours  caspofungin IVPB 50 milliGRAM(s) IV Intermittent every 24 hours  chlorhexidine 2% Cloths 1 Application(s) Topical <User Schedule>  chlorhexidine 4% Liquid 1 Application(s) Topical <User Schedule>  dextrose 50% Injectable 25 Gram(s) IV Push once  dextrose 50% Injectable 12.5 Gram(s) IV Push once  dextrose 50% Injectable 25 Gram(s) IV Push once  dextrose Oral Gel 15 Gram(s) Oral once  epoetin teena-epbx (RETACRIT) Injectable 5000 Unit(s) IV Push <User Schedule>  glucagon  Injectable 1 milliGRAM(s) IntraMuscular once  guaiFENesin Oral Liquid (Sugar-Free) 200 milliGRAM(s) Oral every 6 hours PRN  hydrocortisone 2.5% Rectal Cream 1 Application(s) Rectal three times a day  hydrocortisone hemorrhoidal Suppository 1 Suppository(s) Rectal daily  insulin glargine Injectable (LANTUS) 16 Unit(s) SubCutaneous at bedtime  insulin lispro (ADMELOG) corrective regimen sliding scale   SubCutaneous three times a day before meals  insulin lispro (ADMELOG) corrective regimen sliding scale   SubCutaneous at bedtime  insulin lispro Injectable (ADMELOG) 6 Unit(s) SubCutaneous three times a day with meals  metoprolol tartrate 25 milliGRAM(s) Oral two times a day  multivitamin/minerals/iron Oral Solution (CENTRUM) 15 milliLiter(s) Oral daily  oxycodone    5 mG/acetaminophen 325 mG 1 Tablet(s) Oral every 4 hours PRN  pantoprazole    Tablet 40 milliGRAM(s) Oral before breakfast  polyethylene glycol 3350 17 Gram(s) Oral daily  polyethylene glycol 3350 17 Gram(s) Oral once  psyllium Powder 1 Packet(s) Oral daily  senna 2 Tablet(s) Oral at bedtime  sevelamer carbonate 800 milliGRAM(s) Oral three times a day with meals  simethicone 80 milliGRAM(s) Chew daily PRN  thiamine 100 milliGRAM(s) Oral daily        Vitals:  Vital Signs Last 24 Hours  T(C): 36.5 (01-23-23 @ 12:31), Max: 36.7 (01-22-23 @ 20:15)  HR: 97 (01-23-23 @ 12:31) (83 - 97)  BP: 121/75 (01-23-23 @ 12:31) (103/63 - 121/75)  RR: 18 (01-23-23 @ 12:31) (18 - 18)  SpO2: 100% (01-23-23 @ 12:31) (91% - 100%)        I&O's Summary    22 Jan 2023 07:01  -  23 Jan 2023 07:00  --------------------------------------------------------  IN: 820 mL / OUT: 200 mL / NET: 620 mL    23 Jan 2023 07:01  -  23 Jan 2023 14:28  --------------------------------------------------------  IN: 370 mL / OUT: 0 mL / NET: 370 mL        Physical Exam:  General: No distress. Comfortable.  Neck: JVP ~12   Chest: Clear to auscultation bilaterally  CV: Normal S1 and S2. No murmurs, rub, or gallops. Radial pulses normal.  Abdomen: Soft, non-distended, non-tender  Extremities: warm and well perfused   Neurology: Alert and oriented times three.    Labs:                        9.8    13.55 )-----------( 183      ( 23 Jan 2023 06:17 )             32.9     01-23    134<L>  |  94<L>  |  51<H>  ----------------------------<  142<H>  3.7   |  22  |  3.09<H>    Ca    9.3      23 Jan 2023 06:17    TPro  7.8  /  Alb  3.7  /  TBili  0.6  /  DBili  x   /  AST  20  /  ALT  6<L>  /  AlkPhos  92  01-23      CARDIAC MARKERS ( 23 Jan 2023 06:17 )  x     / x     / 36 U/L / x     / 2.4 ng/mL      Creatine Kinase, Serum: 36 U/L (01-23-23 @ 06:17)  Creatine Kinase, Serum: 36 U/L (01-23-23 @ 06:17)

## 2023-01-23 NOTE — PROGRESS NOTE ADULT - SUPERVISING ATTENDING
Suzette
Doc Rodrigues
Dr. Rodrigues
Dr Rodrigues
Dr. Rodrigues
Doc Rodrigues
Doc Rodrigues
Dr. Rodrigues
MD Rodrigues
DR. Rodrigues
Brent Daniel MD
MD Rodrigues
MD Peralta
DR. Rodrigues
MD Rodrigues
Dr. Rodrigues

## 2023-01-23 NOTE — PROGRESS NOTE ADULT - PROBLEM SELECTOR PLAN 2
- CT abd 11/24 showing acute pancreatitis, RUQ showing sludge, no stones  - lipase > 3000 11/24, normalized prior and now fluctuating  - followed by GI and surgery with no plan for intervention - was on CVVH now on iHD   - appreciate nephrology recommendations   - avoid nephrotoxins  - s/p RIJ permacath 1/11  - s/p 1/20  vasc sx  RUE AV fistula  Hepatitis panel sent    [facility] dialysis referral sent- await placement  hepatitis profile sent today- await result for discharge/ outpatient HD placement - was on CVVH now on iHD   - appreciate nephrology recommendations   - avoid nephrotoxins  - s/p RIJ permacath 1/11  - s/p 1/20  vasc sx  RUE AV fistula  Hepatitis panel sent    [facility] dialysis referral sent- await placement  hepatitis profile sent today- await result for discharge/ outpatient HD placement - was on CVVH now on iHD   - appreciate nephrology recommendations   - avoid nephrotoxins  - s/p RIJ permacath 1/11  - s/p 1/20  vasc sx  RUE AV fistula  Hepatitis panel sent    [facility] dialysis referral sent- await placement  hepatitis profile sent today- await result for discharge/ outpatient HD placement

## 2023-01-23 NOTE — PROGRESS NOTE ADULT - PROBLEM SELECTOR PROBLEM 6
Nontraumatic subdural hematoma
Nontraumatic subdural hematoma
Leukocytosis
Nontraumatic subdural hematoma
Acute respiratory failure with hypoxia
Nontraumatic subdural hematoma
Acute respiratory failure with hypoxia
Acute respiratory failure with hypoxia
Nontraumatic subdural hematoma
Acute respiratory failure with hypoxia
Leukocytosis
Acute respiratory failure with hypoxia
Nontraumatic subdural hematoma
Acute respiratory failure with hypoxia
Nontraumatic subdural hematoma

## 2023-01-23 NOTE — PROVIDER CONTACT NOTE (CRITICAL VALUE NOTIFICATION) - BACKGROUND
Admitted with pancreatitis, elevated troponin, nausea & vomiting , abd. pain. 11/25  PEA- ECMO started and D/cd 12./8 . 12/16 S/P Mitral clip.

## 2023-01-23 NOTE — PROGRESS NOTE ADULT - PROBLEM SELECTOR PLAN 3
- likely arrest associated ATN, hypovolemia; nonoliguric   - currently tolerating iHD, nephrology following   - s/p permacath with IR  - s/p AV fistula with Vascular on 1/20

## 2023-01-23 NOTE — PROGRESS NOTE ADULT - PROBLEM SELECTOR PLAN 2
- initially troponin peaked at > 26617   - [facility] 12/06 with IABP placement revealed that 3 grafts are closed w/ distal native disease from remaining LAD graft  - continue statin and aspirin.  - BB as stated above - initially troponin peaked at > 78293   - [facility] 12/06 with IABP placement revealed that 3 grafts are closed w/ distal native disease from remaining LAD graft  - continue statin and aspirin.  - BB as stated above - initially troponin peaked at > 42927   - [facility] 12/06 with IABP placement revealed that 3 grafts are closed w/ distal native disease from remaining LAD graft  - continue statin and aspirin.  - BB as stated above

## 2023-01-23 NOTE — PROGRESS NOTE ADULT - PROBLEM SELECTOR PLAN 3
- was on CVVH now on iHD   - appreciate nephrology recommendations   - avoid nephrotoxins  - s/p RIJ permacath 1/11  - 1/20  vasc sx  RUE AV fistula  Hepatitis panel sent    [facility] dialysis referral sent - was on CVVH now on iHD   - appreciate nephrology recommendations   - avoid nephrotoxins  - s/p RIJ permacath 1/11  - 1/20  vasc sx  RUE AV fistula  Hepatitis panel sent    [facility] dialysis referral sent - was on CVVH now on iHD   - appreciate nephrology recommendations   - avoid nephrotoxins  - s/p RIJ permacath 1/11  - 1/20  vasc sx  RUE AV fistula  Hepatitis panel sent    [facility] dialysis referral sent - was on CVVH now on iHD   - appreciate nephrology recommendations   - avoid nephrotoxins  - s/p RIJ permacath 1/11  - s/p 1/20  vasc sx  RUE AV fistula  Hepatitis panel sent    [facility] dialysis referral sent- await placement - was on CVVH now on iHD   - appreciate nephrology recommendations   - avoid nephrotoxins  - s/p RIJ permacath 1/11  - s/p 1/20  vasc sx  RUE AV fistula  Hepatitis panel sent    [facility] dialysis referral sent- await placement - was on CVVH now on iHD   - appreciate nephrology recommendations   - avoid nephrotoxins  - s/p RIJ permacath 1/11  - s/p 1/20  vasc sx  RUE AV fistula  Hepatitis panel sent    [facility] dialysis referral sent- await placement - small 3mm subdural hemorrhage 11/25, stable on repeat CTs, last 12/13  - okay to continue heparin, appreciate neuro recs  - keep MAP < 75 mmHg and careful monitoring of PTT.

## 2023-01-23 NOTE — CHART NOTE - NSCHARTNOTESELECT_GEN_ALL_CORE
Event Note
Event Note
Heart Failure/Event Note
Nutrition Services
Nutrition Services
Post-Operative Check
Nutrition Services
Event Note
Advanced GI/Event Note
Code ECMO
ENT/Event Note
Event Note
Nutrition Services
Rising Troponin

## 2023-01-23 NOTE — PROGRESS NOTE ADULT - PROBLEM SELECTOR PLAN 1
- ischemic with most recent TTE 1/9 showing EF 25%  - weaned off Midodrine 1/23  - continue with Lopressor 25 mg PO BID, starting tomorrow transition to Toprol XL 50 mg PO QD   - Heart Failure follow up has been arranged for Monday 2/6 at 9:30 am.

## 2023-01-23 NOTE — PROGRESS NOTE ADULT - PROBLEM SELECTOR PLAN 1
TTE 1/9 showing EF 25%  Continue on Amio 200 mg PO daily   Continue on Lopressor 25 mg PO BID TTE 1/9 showing EF 25%  Continue on Amio 200 mg PO daily   Continue on Lopressor 25 mg PO BID  F/u w/ CHF as an outpatient  HD as per renal- await outpatient HD placement TTE 1/9 showing EF 25%  Continue on Amio 200 mg PO daily    f/u CHF as an outpatient on 2/9 at 9:30; change lopressor to toprol 50 qd starting tue 1/24 as per CHF  HD as per renal- await outpatient HD placement

## 2023-01-23 NOTE — PROGRESS NOTE ADULT - PROBLEM SELECTOR PLAN 6
- small 3mm subdural hemorrhage 11/25, stable on repeat CTs, last 12/13  - okay to continue heparin, appreciate neuro recs  - keep MAP < 75 mmHg and careful monitoring of PTT.
- small 3mm subdural hemorrhage stable on repeat CTs  - okay to continue heparin, appreciate neuro recs  - keep MAP < 75 mmHg and careful monitoring of PTT.
- noted to have bump in WBC, remains afebrile  - ID following  - currently on Caspofungin IV QD  - Blood Cx 12/26 positive for candida tropicalis  - Infectious workup per primary team
- small 3mm subdural hemorrhage stable on repeat CT  - okay to continue argatroban  - keep MAP < 75 mmHg and careful monitoring of PTT
- CXR improved with more lung volumes after bronch and increased PEEP  - bronch showed erythematous airways with scant bleeding  - extubate when able  - continue checking right radial ABG.  - continue treatment for pancreatitis related ARDS  - caution with propofol gtt given pancreatis, would follow lipids.
- small 3mm subdural hemorrhage 11/25, stable on repeat CTs, last 12/13  - okay to continue heparin, appreciate neuro recs  - keep MAP < 75 mmHg and careful monitoring of PTT.
- CXR improved with more lung volumes after bronch and increased PEEP  - bronch showed erythematous airways with scant bleeding  - extubate when able  - continue checking right radial ABG.  - continue treatment for pancreatitis related ARDS  - caution with propofol gtt given pancreatis, would follow lipids.
- small 3mm subdural hemorrhage 11/25, stable on repeat CTs, last 12/13  - okay to continue heparin, appreciate neuro recs  - keep MAP < 75 mmHg and careful monitoring of PTT.
- CXR improved with more lung volumes after bronch and increased PEEP  - bronch showed erythematous airways with scant bleeding  - plan to keep net even today  - extubate when able  - continue checking right radial ABG.  - continue treatment for pancreatitis related ARDS  - caution with propofol gtt given pancreatis, would follow lipids
- now s/p trach  - will perform slow trach wean after MCS removal
- small 3mm subdural hemorrhage stable on repeat CT  - okay to continue argatroban  - keep MAP < 75 mmHg and careful monitoring of PTT
- small 3mm subdural hemorrhage stable on repeat CT  - keep MAP < 75 mmHg and careful monitoring of PTT.
- small 3mm subdural hemorrhage 11/25, stable on repeat CTs, last 12/13  - okay to continue heparin, appreciate neuro recs  - keep MAP < 75 mmHg and careful monitoring of PTT.
- small 3mm subdural hemorrhage stable on repeat CT  - okay to continue argatroban  - keep MAP < 75 mmHg and careful monitoring of PTT.
- small 3mm subdural hemorrhage 11/25, stable on repeat CTs, last 12/13  - okay to continue heparin, appreciate neuro recs  - keep MAP < 75 mmHg and careful monitoring of PTT.
- noted to have bump in WBC, remains afebrile  - ID following  - currently on Caspofungin IV QD  - Blood Cx 12/26 positive for candida tropicalis  - Infectious workup per primary team
- small 3mm subdural hemorrhage stable on repeat CTs  - okay to continue heparin, appreciate neuro recs  - keep MAP < 75 mmHg and careful monitoring of PTT
- CXR improved with more lung volumes after bronch and increased PEEP  - bronch showed erythematous airways with scant bleeding  - extubate when able  - continue treatment for pancreatitis related ARDS.
- small 3mm subdural hemorrhage 11/25, stable on repeat CTs, last 12/13  - okay to continue heparin, appreciate neuro recs  - keep MAP < 75 mmHg and careful monitoring of PTT.
- CXR improved with more lung volumes after bronch and increased PEEP  - bronch showed erythematous airways with scant bleeding  - extubate when able  - continue treatment for pancreatitis related ARDS  - caution with propofol gtt given pancreatis, would follow lipids.
- small 3mm subdural hemorrhage stable on repeat CT  - okay to continue argatroban  - keep MAP < 75 mmHg and careful monitoring of PTT
- small 3mm subdural hemorrhage 11/25, stable on repeat CTs, last 12/13  - okay to continue heparin, appreciate neuro recs  - keep MAP < 75 mmHg and careful monitoring of PTT.
- CXR improved with more lung volumes after bronch and increased PEEP  - bronch showed erythematous airways with scant bleeding  - extubate when able  - continue checking right radial ABG.  - continue treatment for pancreatitis related ARDS  - caution with propofol gtt given pancreatis, would follow lipids.
- CXR improved with more lung volumes after bronch and increased PEEP  - bronch showed erythematous airways with scant bleeding  - extubate when able  - continue checking right radial ABG.  - continue treatment for pancreatitis related ARDS  - caution with propofol gtt given pancreatis, would follow lipids.
- small 3mm subdural hemorrhage 11/25, stable on repeat CTs, last 12/13  - okay to continue heparin, appreciate neuro recs  - keep MAP < 75 mmHg and careful monitoring of PTT.
- CXR improved with more lung volumes after bronch and increased PEEP  - bronch showed erythematous airways with scant bleeding  - extubate when able  - continue treatment for pancreatitis related ARDS
- CXR improved with more lung volumes after bronch and increased PEEP  - bronch showed erythematous airways with scant bleeding  - extubate when able  - continue treatment for pancreatitis related ARDS.

## 2023-01-23 NOTE — PROGRESS NOTE ADULT - ASSESSMENT
Assessment  DMT2: 62y Male with DM T2 A1C 7.3% with hyperglycemia admitted with CAD, patient is on basal bolus insulin coverage, sugars improving and within target range, eating meals.  CAD: S/P CABG, on medications, monitored, asymptomatic.  HTN: On antihypertensive medications, monitored, asymptomatic.              Nayeli Quiles MD  Cell:  [phone]  Office: [phone]               Assessment  DMT2: 62y Male with DM T2 A1C 7.3% with hyperglycemia admitted with CAD, patient is on basal bolus insulin coverage, sugars improving and within target range, eating meals.  CAD: S/P CABG, on medications, monitored, asymptomatic.  HTN: On antihypertensive medications, monitored, asymptomatic.              Nayeli Quiles MD  Cell:  [phone]  Office: [phone]               Assessment  DMT2: 62y Male with DM T2 A1C 7.3% with hyperglycemia admitted with CAD, patient is on basal bolus insulin coverage, sugars improving and within target range, eating meals.  CAD: S/P CABG, on medications, monitored, asymptomatic.  HTN: On antihypertensive medications, monitored, asymptomatic.              Nayeli Quiles MD  Cell:  [phone]  Office: [phone]

## 2023-01-23 NOTE — PROGRESS NOTE ADULT - NS ATTEND AMEND GEN_ALL_CORE FT
Patient seen and examined at bedside.   The patient appears warm and well perfused without significant edema  He has been weaned off midodrine with adequate blood pressures. If patient is staying another day or two can start low dose hydralazine 10mg TID. If leaving today will start it as an outpatient  Switch metoprolol tartrate to Toprol  Volume management per HD  Will set patient up with CHF appointment   Plan for discharge to acute rehab

## 2023-01-23 NOTE — PROGRESS NOTE ADULT - SUBJECTIVE AND OBJECTIVE BOX
VITAL SIGNS    Telemetry:    Vital Signs Last 24 Hrs  T(C): 36.7 (01-23-23 @ 04:32), Max: 37.1 (01-22-23 @ 12:32)  T(F): 98.1 (01-23-23 @ 04:32), Max: 98.8 (01-22-23 @ 12:32)  HR: 88 (01-23-23 @ 04:32) (83 - 88)  BP: 105/64 (01-23-23 @ 04:32) (100/59 - 105/64)  RR: 18 (01-23-23 @ 04:32) (18 - 18)  SpO2: 99% (01-23-23 @ 04:32) (91% - 100%)            01-22 @ 07:01  -  01-23 @ 07:00  --------------------------------------------------------  IN: 820 mL / OUT: 200 mL / NET: 620 mL    01-23 @ 07:01  -  01-23 @ 10:56  --------------------------------------------------------  IN: 370 mL / OUT: 0 mL / NET: 370 mL       Daily     Daily   Admit Wt: Drug Dosing Weight  Height (cm): 172.7 (20 Jan 2023 17:23)  Weight (kg): 81.3 (20 Jan 2023 17:23)  BMI (kg/m2): 27.3 (20 Jan 2023 17:23)  BSA (m2): 1.95 (20 Jan 2023 17:23)    Bilirubin Total, Serum: 0.6 mg/dL (01-23 @ 06:17)    CAPILLARY BLOOD GLUCOSE      POCT Blood Glucose.: 163 mg/dL (23 Jan 2023 07:19)  POCT Blood Glucose.: 102 mg/dL (22 Jan 2023 21:37)  POCT Blood Glucose.: 129 mg/dL (22 Jan 2023 16:24)  POCT Blood Glucose.: 153 mg/dL (22 Jan 2023 11:10)          aspirin  chewable 81 milliGRAM(s) Oral daily  atorvastatin 80 milliGRAM(s) Oral at bedtime  baclofen 5 milliGRAM(s) Oral every 8 hours  benzonatate 100 milliGRAM(s) Oral every 8 hours  caspofungin IVPB 50 milliGRAM(s) IV Intermittent every 24 hours  chlorhexidine 2% Cloths 1 Application(s) Topical <User Schedule>  chlorhexidine 4% Liquid 1 Application(s) Topical <User Schedule>  dextrose 50% Injectable 25 Gram(s) IV Push once  dextrose 50% Injectable 12.5 Gram(s) IV Push once  dextrose 50% Injectable 25 Gram(s) IV Push once  dextrose Oral Gel 15 Gram(s) Oral once  epoetin teena-epbx (RETACRIT) Injectable 5000 Unit(s) IV Push <User Schedule>  glucagon  Injectable 1 milliGRAM(s) IntraMuscular once  guaiFENesin Oral Liquid (Sugar-Free) 200 milliGRAM(s) Oral every 6 hours PRN  hydrocortisone 2.5% Rectal Cream 1 Application(s) Rectal three times a day  hydrocortisone hemorrhoidal Suppository 1 Suppository(s) Rectal daily  insulin glargine Injectable (LANTUS) 16 Unit(s) SubCutaneous at bedtime  insulin lispro (ADMELOG) corrective regimen sliding scale   SubCutaneous three times a day before meals  insulin lispro (ADMELOG) corrective regimen sliding scale   SubCutaneous at bedtime  insulin lispro Injectable (ADMELOG) 6 Unit(s) SubCutaneous three times a day with meals  metoprolol tartrate 25 milliGRAM(s) Oral two times a day  multivitamin/minerals/iron Oral Solution (CENTRUM) 15 milliLiter(s) Oral daily  oxycodone    5 mG/acetaminophen 325 mG 1 Tablet(s) Oral every 4 hours PRN  pantoprazole    Tablet 40 milliGRAM(s) Oral before breakfast  polyethylene glycol 3350 17 Gram(s) Oral daily  polyethylene glycol 3350 17 Gram(s) Oral once  psyllium Powder 1 Packet(s) Oral daily  senna 2 Tablet(s) Oral at bedtime  sevelamer carbonate 800 milliGRAM(s) Oral three times a day with meals  simethicone 80 milliGRAM(s) Chew daily PRN  thiamine 100 milliGRAM(s) Oral daily      PHYSICAL EXAM    Subjective: "Hi.   Neurology: alert and oriented x 3, nonfocal, no gross deficits  CV : tele:  RSR  Sternal Wound :  CDI with dressing , Stable  Lungs: clear. RR easy, unlabored   Abdomen: soft, nontender, nondistended, positive bowel sounds, bowel movement   Neg N/V/D   :  pt voiding without difficulty   Extremities:   COBB; edema, neg calf tenderness.   PPP bilaterally      PW:  Chest tubes:                 VITAL SIGNS    Telemetry:  rsr 80- 90   Vital Signs Last 24 Hrs  T(C): 36.7 (01-23-23 @ 04:32), Max: 37.1 (01-22-23 @ 12:32)  T(F): 98.1 (01-23-23 @ 04:32), Max: 98.8 (01-22-23 @ 12:32)  HR: 88 (01-23-23 @ 04:32) (83 - 88)  BP: 105/64 (01-23-23 @ 04:32) (100/59 - 105/64)  RR: 18 (01-23-23 @ 04:32) (18 - 18)  SpO2: 99% (01-23-23 @ 04:32) (91% - 100%)            01-22 @ 07:01  -  01-23 @ 07:00  --------------------------------------------------------  IN: 820 mL / OUT: 200 mL / NET: 620 mL    01-23 @ 07:01  -  01-23 @ 10:56  --------------------------------------------------------  IN: 370 mL / OUT: 0 mL / NET: 370 mL       Daily     Daily   Admit Wt: Drug Dosing Weight  Height (cm): 172.7 (20 Jan 2023 17:23)  Weight (kg): 81.3 (20 Jan 2023 17:23)  BMI (kg/m2): 27.3 (20 Jan 2023 17:23)  BSA (m2): 1.95 (20 Jan 2023 17:23)    Bilirubin Total, Serum: 0.6 mg/dL (01-23 @ 06:17)    CAPILLARY BLOOD GLUCOSE      POCT Blood Glucose.: 163 mg/dL (23 Jan 2023 07:19)  POCT Blood Glucose.: 102 mg/dL (22 Jan 2023 21:37)  POCT Blood Glucose.: 129 mg/dL (22 Jan 2023 16:24)  POCT Blood Glucose.: 153 mg/dL (22 Jan 2023 11:10)          aspirin  chewable 81 milliGRAM(s) Oral daily  atorvastatin 80 milliGRAM(s) Oral at bedtime  baclofen 5 milliGRAM(s) Oral every 8 hours  benzonatate 100 milliGRAM(s) Oral every 8 hours  caspofungin IVPB 50 milliGRAM(s) IV Intermittent every 24 hours  chlorhexidine 2% Cloths 1 Application(s) Topical <User Schedule>  chlorhexidine 4% Liquid 1 Application(s) Topical <User Schedule>  dextrose 50% Injectable 25 Gram(s) IV Push once  dextrose 50% Injectable 12.5 Gram(s) IV Push once  dextrose 50% Injectable 25 Gram(s) IV Push once  dextrose Oral Gel 15 Gram(s) Oral once  epoetin teena-epbx (RETACRIT) Injectable 5000 Unit(s) IV Push <User Schedule>  glucagon  Injectable 1 milliGRAM(s) IntraMuscular once  guaiFENesin Oral Liquid (Sugar-Free) 200 milliGRAM(s) Oral every 6 hours PRN  hydrocortisone 2.5% Rectal Cream 1 Application(s) Rectal three times a day  hydrocortisone hemorrhoidal Suppository 1 Suppository(s) Rectal daily  insulin glargine Injectable (LANTUS) 16 Unit(s) SubCutaneous at bedtime  insulin lispro (ADMELOG) corrective regimen sliding scale   SubCutaneous three times a day before meals  insulin lispro (ADMELOG) corrective regimen sliding scale   SubCutaneous at bedtime  insulin lispro Injectable (ADMELOG) 6 Unit(s) SubCutaneous three times a day with meals  metoprolol tartrate 25 milliGRAM(s) Oral two times a day  multivitamin/minerals/iron Oral Solution (CENTRUM) 15 milliLiter(s) Oral daily  oxycodone    5 mG/acetaminophen 325 mG 1 Tablet(s) Oral every 4 hours PRN  pantoprazole    Tablet 40 milliGRAM(s) Oral before breakfast  polyethylene glycol 3350 17 Gram(s) Oral daily  polyethylene glycol 3350 17 Gram(s) Oral once  psyllium Powder 1 Packet(s) Oral daily  senna 2 Tablet(s) Oral at bedtime  sevelamer carbonate 800 milliGRAM(s) Oral three times a day with meals  simethicone 80 milliGRAM(s) Chew daily PRN  thiamine 100 milliGRAM(s) Oral daily        PHYSICAL EXAM    Subjective: "I feel better."   Neurology: alert and oriented x 3, nonfocal, no gross deficits  CV : tele:  RSR    rt permacath cdi w/ dressing   Lungs: clear. RR easy, unlabored; former trach site cdi teresa   Abdomen: soft, nontender, nondistended, positive bowel sounds, + bowel movement     :  HD patient   Extremities:   COBB; trace LE edema, neg calf tenderness.  PPP bilaterally; rt groin vac d/c by pt this am;  RT AVF cdi w/ dressing

## 2023-01-23 NOTE — PROGRESS NOTE ADULT - PROBLEM SELECTOR PLAN 1
Will continue current insulin regimen for now. Will continue monitoring FS, log, will Follow up.  Suggest to DC home on current Lantus dose qhs, Tradjenta 5mg PO daily, as long as blood sugar levels stable. Endocrine follow up. Discussed with patient/family. Will continue current insulin regimen for now. Will continue monitoring FS, log, will Follow up.  Suggest to DC home on current Lantus dose qhs, Tradjenta 5mg PO daily, as long as blood sugar levels stable. Endocrine follow up. Discussed with patient/family.    May get discharged on current Lantus dose qHS and Trajenta 5 mg PO daily. Discontinue all other prior home diabetes medication. Follow up endocrinology 2 weeks.

## 2023-01-23 NOTE — PROGRESS NOTE ADULT - PA/NP ONLY VISIT
PA/NP only visit

## 2023-01-23 NOTE — PROGRESS NOTE ADULT - PROBLEM SELECTOR PLAN 5
continue asa/ statin/ bb and amio 200 qd  HD as per renal  vac changes to rt groin MMF  continue Caspofungin  per Dr. Rodrigues  nutritional optimization; ck prealbumin --> 21  Proamatine 5 mg tid   discharge planning- acute rehab- family  and patient  desire home continue asa/ statin/ bb and amio 200 qd  HD as per renal  rt groin vac d/c today- no longer necessary  continue Caspofungin  per Dr. Rodrigues  nutritional optimization; ck prealbumin --> 21  Proamatine d/c   discharge planning- acute rehab recommended; pt wants to home with PT - await HD placement Endo following  accuchecks ac and hs  dm diet  lantus 16 units qhs and trajenta 5 mg po qd for discharge as per endo

## 2023-01-23 NOTE — PROGRESS NOTE ADULT - PROBLEM SELECTOR PLAN 4
- small 3mm subdural hemorrhage 11/25, stable on repeat CTs, last 12/13  - okay to continue heparin, appreciate neuro recs  - keep MAP < 75 mmHg and careful monitoring of PTT. continue asa/ statin/ bb and amio 200 qd  HD as per renal  rt groin vac d/c today- no longer necessary  continue Caspofungin  per Dr. Rodrigues-d/c on day of discharge   nutritional optimization; ck prealbumin --> 21  Proamatine d/c this am 1/23  discharge planning- acute rehab recommended; pt wants to home with PT - await HD placement

## 2023-01-24 ENCOUNTER — TRANSCRIPTION ENCOUNTER (OUTPATIENT)
Age: 63
End: 2023-01-24

## 2023-01-24 LAB
ANION GAP SERPL CALC-SCNC: 17 MMOL/L — SIGNIFICANT CHANGE UP (ref 5–17)
BLD GP AB SCN SERPL QL: NEGATIVE — SIGNIFICANT CHANGE UP
BUN SERPL-MCNC: 70 MG/DL — HIGH (ref 7–23)
CALCIUM SERPL-MCNC: 9.1 MG/DL — SIGNIFICANT CHANGE UP (ref 8.4–10.5)
CHLORIDE SERPL-SCNC: 96 MMOL/L — SIGNIFICANT CHANGE UP (ref 96–108)
CO2 SERPL-SCNC: 22 MMOL/L — SIGNIFICANT CHANGE UP (ref 22–31)
CREAT SERPL-MCNC: 3.22 MG/DL — HIGH (ref 0.5–1.3)
EGFR: 21 ML/MIN/1.73M2 — LOW
GLUCOSE BLDC GLUCOMTR-MCNC: 130 MG/DL — HIGH (ref 70–99)
GLUCOSE BLDC GLUCOMTR-MCNC: 153 MG/DL — HIGH (ref 70–99)
GLUCOSE BLDC GLUCOMTR-MCNC: 196 MG/DL — HIGH (ref 70–99)
GLUCOSE BLDC GLUCOMTR-MCNC: 79 MG/DL — SIGNIFICANT CHANGE UP (ref 70–99)
GLUCOSE SERPL-MCNC: 170 MG/DL — HIGH (ref 70–99)
HAV IGM SER-ACNC: SIGNIFICANT CHANGE UP
HBV CORE IGM SER-ACNC: SIGNIFICANT CHANGE UP
HBV SURFACE AG SER-ACNC: SIGNIFICANT CHANGE UP
HCT VFR BLD CALC: 28.1 % — LOW (ref 39–50)
HCV AB S/CO SERPL IA: 0.11 S/CO — SIGNIFICANT CHANGE UP (ref 0–0.99)
HCV AB SERPL-IMP: SIGNIFICANT CHANGE UP
HGB BLD-MCNC: 8.5 G/DL — LOW (ref 13–17)
MCHC RBC-ENTMCNC: 29.9 PG — SIGNIFICANT CHANGE UP (ref 27–34)
MCHC RBC-ENTMCNC: 30.2 GM/DL — LOW (ref 32–36)
MCV RBC AUTO: 98.9 FL — SIGNIFICANT CHANGE UP (ref 80–100)
NRBC # BLD: 0 /100 WBCS — SIGNIFICANT CHANGE UP (ref 0–0)
PLATELET # BLD AUTO: 193 K/UL — SIGNIFICANT CHANGE UP (ref 150–400)
POTASSIUM SERPL-MCNC: 3.7 MMOL/L — SIGNIFICANT CHANGE UP (ref 3.5–5.3)
POTASSIUM SERPL-SCNC: 3.7 MMOL/L — SIGNIFICANT CHANGE UP (ref 3.5–5.3)
RBC # BLD: 2.84 M/UL — LOW (ref 4.2–5.8)
RBC # FLD: 18.5 % — HIGH (ref 10.3–14.5)
RH IG SCN BLD-IMP: POSITIVE — SIGNIFICANT CHANGE UP
SODIUM SERPL-SCNC: 135 MMOL/L — SIGNIFICANT CHANGE UP (ref 135–145)
WBC # BLD: 11.18 K/UL — HIGH (ref 3.8–10.5)
WBC # FLD AUTO: 11.18 K/UL — HIGH (ref 3.8–10.5)

## 2023-01-24 PROCEDURE — 90935 HEMODIALYSIS ONE EVALUATION: CPT

## 2023-01-24 PROCEDURE — 86078 PHYS BLOOD BANK SERV REACTJ: CPT

## 2023-01-24 PROCEDURE — 74230 X-RAY XM SWLNG FUNCJ C+: CPT | Mod: 26

## 2023-01-24 PROCEDURE — 99232 SBSQ HOSP IP/OBS MODERATE 35: CPT | Mod: 24

## 2023-01-24 RX ORDER — INSULIN LISPRO 100/ML
7 VIAL (ML) SUBCUTANEOUS
Refills: 0 | Status: DISCONTINUED | OUTPATIENT
Start: 2023-01-24 | End: 2023-01-25

## 2023-01-24 RX ORDER — INSULIN GLARGINE 100 [IU]/ML
18 INJECTION, SOLUTION SUBCUTANEOUS AT BEDTIME
Refills: 0 | Status: DISCONTINUED | OUTPATIENT
Start: 2023-01-24 | End: 2023-01-25

## 2023-01-24 RX ORDER — ACETAMINOPHEN 500 MG
650 TABLET ORAL EVERY 6 HOURS
Refills: 0 | Status: DISCONTINUED | OUTPATIENT
Start: 2023-01-24 | End: 2023-01-25

## 2023-01-24 RX ADMIN — SEVELAMER CARBONATE 800 MILLIGRAM(S): 2400 POWDER, FOR SUSPENSION ORAL at 11:59

## 2023-01-24 RX ADMIN — PANTOPRAZOLE SODIUM 40 MILLIGRAM(S): 20 TABLET, DELAYED RELEASE ORAL at 05:20

## 2023-01-24 RX ADMIN — Medication 1 APPLICATION(S): at 21:20

## 2023-01-24 RX ADMIN — CASPOFUNGIN ACETATE 260 MILLIGRAM(S): 7 INJECTION, POWDER, LYOPHILIZED, FOR SOLUTION INTRAVENOUS at 13:59

## 2023-01-24 RX ADMIN — Medication 6 UNIT(S): at 07:56

## 2023-01-24 RX ADMIN — ERYTHROPOIETIN 5000 UNIT(S): 10000 INJECTION, SOLUTION INTRAVENOUS; SUBCUTANEOUS at 10:00

## 2023-01-24 RX ADMIN — Medication 81 MILLIGRAM(S): at 08:31

## 2023-01-24 RX ADMIN — INSULIN GLARGINE 18 UNIT(S): 100 INJECTION, SOLUTION SUBCUTANEOUS at 21:25

## 2023-01-24 RX ADMIN — Medication 15 MILLILITER(S): at 08:32

## 2023-01-24 RX ADMIN — Medication 650 MILLIGRAM(S): at 14:45

## 2023-01-24 RX ADMIN — SEVELAMER CARBONATE 800 MILLIGRAM(S): 2400 POWDER, FOR SUSPENSION ORAL at 18:14

## 2023-01-24 RX ADMIN — Medication 1: at 07:55

## 2023-01-24 RX ADMIN — Medication 1 PACKET(S): at 18:12

## 2023-01-24 RX ADMIN — Medication 50 MILLIGRAM(S): at 05:20

## 2023-01-24 RX ADMIN — Medication 1 APPLICATION(S): at 05:26

## 2023-01-24 RX ADMIN — Medication 100 MILLIGRAM(S): at 05:20

## 2023-01-24 RX ADMIN — Medication 650 MILLIGRAM(S): at 14:00

## 2023-01-24 RX ADMIN — Medication 7 UNIT(S): at 16:30

## 2023-01-24 RX ADMIN — Medication 5 MILLIGRAM(S): at 13:39

## 2023-01-24 RX ADMIN — CHLORHEXIDINE GLUCONATE 1 APPLICATION(S): 213 SOLUTION TOPICAL at 11:47

## 2023-01-24 RX ADMIN — Medication 5 MILLIGRAM(S): at 21:20

## 2023-01-24 RX ADMIN — Medication 200 MILLIGRAM(S): at 11:59

## 2023-01-24 RX ADMIN — Medication 100 MILLIGRAM(S): at 08:32

## 2023-01-24 RX ADMIN — Medication 100 MILLIGRAM(S): at 21:19

## 2023-01-24 RX ADMIN — ATORVASTATIN CALCIUM 80 MILLIGRAM(S): 80 TABLET, FILM COATED ORAL at 21:20

## 2023-01-24 RX ADMIN — SEVELAMER CARBONATE 800 MILLIGRAM(S): 2400 POWDER, FOR SUSPENSION ORAL at 08:18

## 2023-01-24 RX ADMIN — Medication 7 UNIT(S): at 11:59

## 2023-01-24 RX ADMIN — Medication 5 MILLIGRAM(S): at 05:20

## 2023-01-24 RX ADMIN — Medication 100 MILLIGRAM(S): at 13:40

## 2023-01-24 NOTE — DISCHARGE NOTE NURSING/CASE MANAGEMENT/SOCIAL WORK - NSDCDMENUMBER_GEN_ALL_CORE_FT
Count includes the [facility]  [facility]  [facility]

## 2023-01-24 NOTE — PROGRESS NOTE ADULT - SUBJECTIVE AND OBJECTIVE BOX
Chief complaint    Patient is a 62y old  Male who presents with a chief complaint of Acute cholecystitis, gallstone pancreatitis (23 Jan 2023 14:28)   Review of systems  Patient in bed, appears comfortable.    Labs and Fingersticks  CAPILLARY BLOOD GLUCOSE      POCT Blood Glucose.: 196 mg/dL (24 Jan 2023 07:27)  POCT Blood Glucose.: 144 mg/dL (23 Jan 2023 21:23)  POCT Blood Glucose.: 151 mg/dL (23 Jan 2023 16:27)  POCT Blood Glucose.: 168 mg/dL (23 Jan 2023 11:32)      Anion Gap, Serum: 17 (01-24 @ 01:47)  Anion Gap, Serum: 18 *H* (01-23 @ 06:17)  Anion Gap, Serum: 15 (01-22 @ 10:06)      Calcium, Total Serum: 9.1 (01-24 @ 01:47)  Calcium, Total Serum: 9.3 (01-23 @ 06:17)  Calcium, Total Serum: 8.9 (01-22 @ 10:06)  Albumin, Serum: 3.7 (01-23 @ 06:17)    Alanine Aminotransferase (ALT/SGPT): 6 *L* (01-23 @ 06:17)  Alkaline Phosphatase, Serum: 92 (01-23 @ 06:17)  Aspartate Aminotransferase (AST/SGOT): 20 (01-23 @ 06:17)        01-24    135  |  96  |  70<H>  ----------------------------<  170<H>  3.7   |  22  |  3.22<H>    Ca    9.1      24 Jan 2023 01:47    TPro  7.8  /  Alb  3.7  /  TBili  0.6  /  DBili  x   /  AST  20  /  ALT  6<L>  /  AlkPhos  92  01-23                        8.5    11.18 )-----------( 193      ( 24 Jan 2023 01:47 )             28.1     Medications  MEDICATIONS  (STANDING):  aspirin  chewable 81 milliGRAM(s) Oral daily  atorvastatin 80 milliGRAM(s) Oral at bedtime  baclofen 5 milliGRAM(s) Oral every 8 hours  benzonatate 100 milliGRAM(s) Oral every 8 hours  caspofungin IVPB 50 milliGRAM(s) IV Intermittent every 24 hours  chlorhexidine 2% Cloths 1 Application(s) Topical <User Schedule>  chlorhexidine 4% Liquid 1 Application(s) Topical <User Schedule>  dextrose 50% Injectable 25 Gram(s) IV Push once  dextrose 50% Injectable 12.5 Gram(s) IV Push once  dextrose 50% Injectable 25 Gram(s) IV Push once  dextrose Oral Gel 15 Gram(s) Oral once  epoetin teena-epbx (RETACRIT) Injectable 5000 Unit(s) IV Push <User Schedule>  glucagon  Injectable 1 milliGRAM(s) IntraMuscular once  hydrocortisone 2.5% Rectal Cream 1 Application(s) Rectal three times a day  hydrocortisone hemorrhoidal Suppository 1 Suppository(s) Rectal daily  insulin glargine Injectable (LANTUS) 18 Unit(s) SubCutaneous at bedtime  insulin lispro (ADMELOG) corrective regimen sliding scale   SubCutaneous three times a day before meals  insulin lispro (ADMELOG) corrective regimen sliding scale   SubCutaneous at bedtime  insulin lispro Injectable (ADMELOG) 7 Unit(s) SubCutaneous three times a day with meals  metoprolol succinate ER 50 milliGRAM(s) Oral daily  multivitamin/minerals/iron Oral Solution (CENTRUM) 15 milliLiter(s) Oral daily  pantoprazole    Tablet 40 milliGRAM(s) Oral before breakfast  polyethylene glycol 3350 17 Gram(s) Oral daily  polyethylene glycol 3350 17 Gram(s) Oral once  psyllium Powder 1 Packet(s) Oral daily  senna 2 Tablet(s) Oral at bedtime  sevelamer carbonate 800 milliGRAM(s) Oral three times a day with meals  thiamine 100 milliGRAM(s) Oral daily      Physical Exam  General: Patient appears comfortable.  Vital Signs Last 12 Hrs  T(F): 98.1 (01-24-23 @ 04:29), Max: 98.1 (01-24-23 @ 04:29)  HR: 86 (01-24-23 @ 04:29) (86 - 86)  BP: 104/68 (01-24-23 @ 04:29) (104/68 - 104/68)  BP(mean): --  RR: 18 (01-24-23 @ 04:29) (18 - 18)  SpO2: 93% (01-24-23 @ 04:29) (93% - 93%)  Neck: No palpable thyroid nodules.  CVS: S1S2, No murmurs  Respiratory: No wheezing, no crepitations  GI: Abdomen soft, non tender.  Musculoskeletal:  edema lower extremities.     Diagnostics    Free Thyroxine, Serum: AM Sched. Collection: 15-Teo-2023 06:00 (01-14 @ 14:23)  Thyroid Stimulating Hormone, Serum: AM Sched. Collection: 15-Teo-2023 06:00 (01-14 @ 14:23)

## 2023-01-24 NOTE — PROGRESS NOTE ADULT - ASSESSMENT
63 YO M with a history of CAD s/p 4v CABG ~2019 in [facility], DM2 (A1c 7.3%), and HTN who initially presented to OSH with abdominal pain and n/v and found to have pancreatitis with lipase > 3000 though also with elevated troponins. CT abdomen revealed acute pancreatitis and his initial LVEF was 40-45%. He developed MSOF with hypoxic respiratory failure requiring intubation and ERIC and went into hypoxic PEA arrest requiring ACLS and due to persistent hypoxia and hypotension on pressors after ROSC was cannulated to VA ECMO (LFV, RFA, no anterograde perfusion catheter) on 11/25. Notably CTH that day revealed small subdural hemorrhage.     His post arrest TTE on 11/26 showed a significantly worse LVEF of 5-10% with an AV that was only minimally opening. Since then he has had improvement in his LV function to ~35-40% and improved pulsatility while tolerating progressive slow ECMO weans. ECMO turndown (note in chart 11/30) was reassuring for ability to decannulate to IABP/inotropes. LHC 12/06 showed closure of his 3 vein grafts with graft to LAD patent though with distal native disease. No coronary intervention was done. IABP placed, ECMO successfully decannulated 12/08 (transfused 2u pRBCs during).     His ARDS has improved and he's now s/p trach (decannulated 1/8). He's now off IABP, removed 12/17, and inotropes, however has previously been unable to tolerate GDMT due to soft BPs and renal failure requiring dialysis. 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 He's afebrile with improving leukocytosis. He has urine output but not enough to stay euvolemic. He's tolerating intermittent HD and remains on midodrine for BP support.     11/24 Transfer from [facility] to SICU c/f STEMI, abd US +GB stones, pericholecystic fluid  11/25 VA ECMO s/p hypoxic respiratory arrest/PEA arrest, CTH 4mm subdural, ERIC  12/5 CVVHD, RUQ US neg   12/6 IABP placed in cath lab, LIMA to LAD patent, RCA and LCx down, CTH subdural decreased in size, persistent pancreatic inflammation   12/7 TTE turndown, EF 30-40%, nml RV, MR mod, mod TR  12/8 ECMO decannulation, aflutter DCCV x 2  12/12 Mental status change- CTH no change, CT perfusion/CTA H/N neg for LVO, +low grade watershed infarct to L MCA  12/16 Mitral clip x 1, TRACH 7 Shiley XLT w/ ENT  12/17 IABP dced  12/26 +clinton BCx   12/27 R groin vac  1/8 trach decannulation   1/10 passed FEEST  1/11 R permacath placed by IR  1/13 Transferred to SDU  1/14 VSS; RSR ; hd as per renal MWF; continue vac to rt groin ; nutritional optimization and PT; [facility] sx called for AV fistula placement on this admission (vein mapping completed 12/22) ; continue capsofungin as per Dr. Horton; endo consulted today for dm management- placed on lantus and admelog  1/15 VSS; RSR 70-80; HD as per renal - k 3.8 today; continue nutritional support and pt; prealbumin 21; await date of AVF from [facility] sx--> pt cleared as per cardiac surgeon Dr. Horton- pt must have AVF with cardiac anesthesia-discussed with [facility] sx fellow Rajeev Ascencio; continue vac to rt groin; bs improved on lantus/ admelog   discharge planning- acute rehab when stable   1/16 HD, rt groin vac --> labs stable  1/17 Increase ambulation.   AVF this week, will need cardiac anesthesia   May transfer to floor  Cont capsofungin  1/18 AVF this week with cardiac anesthesia/vascular, cleared by Dr horton.  Remains on capsofungin prophylactically, completed coursse  + cough, HD today , cxr done, clear  Colorectal vizcaino for rectal pain, rectal fissure-- hydrocortisone  1/19 AVF tomorrow.  Additional HD session today and likely Saturday  The patient would like to go home instead of rehab, choosing [facility] dialysis in [place]   1/20 AVF today. Add metamucil rectal fissure pain. Additional HD yesterday, next HD Saturday.  Transfer to floor.  1/21 VVS; Plan for HD today.  Proamatine weaned down to 10 mg PO TID.  Plan to wean down to 5 mg PO TID in AM then D/C on 1/23. CXR PA/LA ordered.    1/22 VSS decrease Proamatine to  5tid today   1/23 VSS: RSR 60-80 proamatine d/c this am; HD as per renal TTS; rt groin vac d/c today as per PT; vac no longer necessary; aquacell with tegraderm to rt groin daily; d/w endo home dm recs  1/24  HD this am>recd PRBC x 1 during HD over 1 hour  RN reports 30 mins after transfusion + rigors  >transfusion reaction protocol followed Blood bag returned top blood bank  Type / cross,  blood specimen  collected  Tylenol given  Will observe  Discharge planning- home when outpatient HD placement confirmed; f/u CHF as an outpatient on 2/9 at 9:30; change lopressor to toprol 50 qd starting tue 1/24 as per CHF  as per endo- discharge pt home on lantus 16 qhs and trajenta 5 mg po qd   no further follow-up required as per GI for gallstone pancreatitis    63 YO M with a history of CAD s/p 4v CABG ~2019 in [facility], DM2 (A1c 7.3%), and HTN who initially presented to OSH with abdominal pain and n/v and found to have pancreatitis with lipase > 3000 though also with elevated troponins. CT abdomen revealed acute pancreatitis and his initial LVEF was 40-45%. He developed MSOF with hypoxic respiratory failure requiring intubation and ERIC and went into hypoxic PEA arrest requiring ACLS and due to persistent hypoxia and hypotension on pressors after ROSC was cannulated to VA ECMO (LFV, RFA, no anterograde perfusion catheter) on 11/25. Notably CTH that day revealed small subdural hemorrhage.     His post arrest TTE on 11/26 showed a significantly worse LVEF of 5-10% with an AV that was only minimally opening. Since then he has had improvement in his LV function to ~35-40% and improved pulsatility while tolerating progressive slow ECMO weans. ECMO turndown (note in chart 11/30) was reassuring for ability to decannulate to IABP/inotropes. LHC 12/06 showed closure of his 3 vein grafts with graft to LAD patent though with distal native disease. No coronary intervention was done. IABP placed, ECMO successfully decannulated 12/08 (transfused 2u pRBCs during).     His ARDS has improved and he's now s/p trach (decannulated 1/8). He's now off IABP, removed 12/17, and inotropes, however has previously been unable to tolerate GDMT due to soft BPs and renal failure requiring dialysis. 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 He's afebrile with improving leukocytosis. He has urine output but not enough to stay euvolemic. He's tolerating intermittent HD and remains on midodrine for BP support.     11/24 Transfer from [facility] to SICU c/f STEMI, abd US +GB stones, pericholecystic fluid  11/25 VA ECMO s/p hypoxic respiratory arrest/PEA arrest, CTH 4mm subdural, ERIC  12/5 CVVHD, RUQ US neg   12/6 IABP placed in cath lab, LIMA to LAD patent, RCA and LCx down, CTH subdural decreased in size, persistent pancreatic inflammation   12/7 TTE turndown, EF 30-40%, nml RV, MR mod, mod TR  12/8 ECMO decannulation, aflutter DCCV x 2  12/12 Mental status change- CTH no change, CT perfusion/CTA H/N neg for LVO, +low grade watershed infarct to L MCA  12/16 Mitral clip x 1, TRACH 7 Shiley XLT w/ ENT  12/17 IABP dced  12/26 +clinton BCx   12/27 R groin vac  1/8 trach decannulation   1/10 passed FEEST  1/11 R permacath placed by IR  1/13 Transferred to SDU  1/14 VSS; RSR ; hd as per renal MWF; continue vac to rt groin ; nutritional optimization and PT; [facility] sx called for AV fistula placement on this admission (vein mapping completed 12/22) ; continue capsofungin as per Dr. Horton; endo consulted today for dm management- placed on lantus and admelog  1/15 VSS; RSR 70-80; HD as per renal - k 3.8 today; continue nutritional support and pt; prealbumin 21; await date of AVF from [facility] sx--> pt cleared as per cardiac surgeon Dr. Horton- pt must have AVF with cardiac anesthesia-discussed with [facility] sx fellow Rajeev Ascencio; continue vac to rt groin; bs improved on lantus/ admelog   discharge planning- acute rehab when stable   1/16 HD, rt groin vac --> labs stable  1/17 Increase ambulation.   AVF this week, will need cardiac anesthesia   May transfer to floor  Cont capsofungin  1/18 AVF this week with cardiac anesthesia/vascular, cleared by Dr horton.  Remains on capsofungin prophylactically, completed coursse  + cough, HD today , cxr done, clear  Colorectal vizcaino for rectal pain, rectal fissure-- hydrocortisone  1/19 AVF tomorrow.  Additional HD session today and likely Saturday  The patient would like to go home instead of rehab, choosing [facility] dialysis in [place]   1/20 AVF today. Add metamucil rectal fissure pain. Additional HD yesterday, next HD Saturday.  Transfer to floor.  1/21 VVS; Plan for HD today.  Proamatine weaned down to 10 mg PO TID.  Plan to wean down to 5 mg PO TID in AM then D/C on 1/23. CXR PA/LA ordered.    1/22 VSS decrease Proamatine to  5tid today   1/23 VSS: RSR 60-80 proamatine d/c this am; HD as per renal TTS; rt groin vac d/c today as per PT; vac no longer necessary; aquacell with tegraderm to rt groin daily; d/w endo home dm recs  1/24  HD this am>recd PRBC x 1 during HD over 1 hour  RN reports 30 mins after transfusion + rigors  >transfusion reaction protocol followed Blood bag returned top blood bank  Type / cross,  blood specimen  collected  Tylenol given  Will observe  Discharge planning- home when outpatient HD placement confirmed; f/u CHF as an outpatient on 2/9 at 9:30; change lopressor to toprol 50 qd starting tue 1/24 as per CHF  as per endo- discharge pt home on lantus 16 qhs and trajenta 5 mg po qd   no further follow-up required as per GI for gallstone pancreatitis    61 YO M with a history of CAD s/p 4v CABG ~2019 in [facility], DM2 (A1c 7.3%), and HTN who initially presented to OSH with abdominal pain and n/v and found to have pancreatitis with lipase > 3000 though also with elevated troponins. CT abdomen revealed acute pancreatitis and his initial LVEF was 40-45%. He developed MSOF with hypoxic respiratory failure requiring intubation and ERIC and went into hypoxic PEA arrest requiring ACLS and due to persistent hypoxia and hypotension on pressors after ROSC was cannulated to VA ECMO (LFV, RFA, no anterograde perfusion catheter) on 11/25. Notably CTH that day revealed small subdural hemorrhage.     His post arrest TTE on 11/26 showed a significantly worse LVEF of 5-10% with an AV that was only minimally opening. Since then he has had improvement in his LV function to ~35-40% and improved pulsatility while tolerating progressive slow ECMO weans. ECMO turndown (note in chart 11/30) was reassuring for ability to decannulate to IABP/inotropes. LHC 12/06 showed closure of his 3 vein grafts with graft to LAD patent though with distal native disease. No coronary intervention was done. IABP placed, ECMO successfully decannulated 12/08 (transfused 2u pRBCs during).     His ARDS has improved and he's now s/p trach (decannulated 1/8). He's now off IABP, removed 12/17, and inotropes, however has previously been unable to tolerate GDMT due to soft BPs and renal failure requiring dialysis. 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 He's afebrile with improving leukocytosis. He has urine output but not enough to stay euvolemic. He's tolerating intermittent HD and remains on midodrine for BP support.     11/24 Transfer from [facility] to SICU c/f STEMI, abd US +GB stones, pericholecystic fluid  11/25 VA ECMO s/p hypoxic respiratory arrest/PEA arrest, CTH 4mm subdural, ERIC  12/5 CVVHD, RUQ US neg   12/6 IABP placed in cath lab, LIMA to LAD patent, RCA and LCx down, CTH subdural decreased in size, persistent pancreatic inflammation   12/7 TTE turndown, EF 30-40%, nml RV, MR mod, mod TR  12/8 ECMO decannulation, aflutter DCCV x 2  12/12 Mental status change- CTH no change, CT perfusion/CTA H/N neg for LVO, +low grade watershed infarct to L MCA  12/16 Mitral clip x 1, TRACH 7 Shiley XLT w/ ENT  12/17 IABP dced  12/26 +clinton BCx   12/27 R groin vac  1/8 trach decannulation   1/10 passed FEEST  1/11 R permacath placed by IR  1/13 Transferred to SDU  1/14 VSS; RSR ; hd as per renal MWF; continue vac to rt groin ; nutritional optimization and PT; [facility] sx called for AV fistula placement on this admission (vein mapping completed 12/22) ; continue capsofungin as per Dr. Horton; endo consulted today for dm management- placed on lantus and admelog  1/15 VSS; RSR 70-80; HD as per renal - k 3.8 today; continue nutritional support and pt; prealbumin 21; await date of AVF from [facility] sx--> pt cleared as per cardiac surgeon Dr. Horton- pt must have AVF with cardiac anesthesia-discussed with [facility] sx fellow Rajeev Ascencio; continue vac to rt groin; bs improved on lantus/ admelog   discharge planning- acute rehab when stable   1/16 HD, rt groin vac --> labs stable  1/17 Increase ambulation.   AVF this week, will need cardiac anesthesia   May transfer to floor  Cont capsofungin  1/18 AVF this week with cardiac anesthesia/vascular, cleared by Dr horton.  Remains on capsofungin prophylactically, completed coursse  + cough, HD today , cxr done, clear  Colorectal vizcaino for rectal pain, rectal fissure-- hydrocortisone  1/19 AVF tomorrow.  Additional HD session today and likely Saturday  The patient would like to go home instead of rehab, choosing [facility] dialysis in [place]   1/20 AVF today. Add metamucil rectal fissure pain. Additional HD yesterday, next HD Saturday.  Transfer to floor.  1/21 VVS; Plan for HD today.  Proamatine weaned down to 10 mg PO TID.  Plan to wean down to 5 mg PO TID in AM then D/C on 1/23. CXR PA/LA ordered.    1/22 VSS decrease Proamatine to  5tid today   1/23 VSS: RSR 60-80 proamatine d/c this am; HD as per renal TTS; rt groin vac d/c today as per PT; vac no longer necessary; aquacell with tegraderm to rt groin daily; d/w endo home dm recs  1/24  HD this am>recd PRBC x 1 during HD over 1 hour  RN reports 30 mins after transfusion + rigors  >transfusion reaction protocol followed Blood bag returned top blood bank  Type / cross,  blood specimen  collected  Tylenol given  Will observe  Discharge planning- home when outpatient HD placement confirmed; f/u CHF as an outpatient on 2/9 at 9:30; change lopressor to toprol 50 qd starting tue 1/24 as per CHF  as per endo- discharge pt home on lantus 16 qhs and trajenta 5 mg po qd   no further follow-up required as per GI for gallstone pancreatitis

## 2023-01-24 NOTE — SWALLOW VFSS/MBS ASSESSMENT ADULT - DIAGNOSTIC IMPRESSIONS
61 y/o M with prolonged and complicated hospital course, admitted 11/24/22 with a STEMI and cardiogenic shock, s/p VA ECMO decannulated 12/8, MR s/p Mitral clip x1 12/16, s/p IABP removal 12/17, and acute respiratory distress/failure s/p trach 12/16-1/8. Pt was seen today for MBS prior to d/c home to determine if any improvement in swallow function. Pt presents with a mild pharyngeal dysphagia. No longer requires chin tuck with thin liquids. Single instance of trace penetration with complete retrieval with thin liquids in head neutral position. No penetration or aspiration with all other trials of thin liquids (in head neutral), puree, or solids. Disorders: mildly reduced BOT to posterior pharyngeal wall contact, reduced laryngeal closure. 63 y/o M with prolonged and complicated hospital course, admitted 11/24/22 with a STEMI and cardiogenic shock, s/p VA ECMO decannulated 12/8, MR s/p Mitral clip x1 12/16, s/p IABP removal 12/17, and acute respiratory distress/failure s/p trach 12/16-1/8. Pt was seen today for MBS prior to d/c home to determine if any improvement in swallow function. Pt presents with a mild pharyngeal dysphagia. No longer requires chin tuck with thin liquids. Single instance of trace penetration with complete retrieval with thin liquids in head neutral position. No penetration or aspiration with all other trials of thin liquids (in head neutral), puree, or solids. Disorders: mildly reduced BOT to posterior pharyngeal wall contact, reduced laryngeal closure.

## 2023-01-24 NOTE — PROGRESS NOTE ADULT - SUBJECTIVE AND OBJECTIVE BOX
[facility] DIVISION OF KIDNEY DISEASES AND HYPERTENSION -- PROGRESS NOTE    Chief complaint: ERIC    24 hour events/subjective: seen on HD        PAST HISTORY  --------------------------------------------------------------------------------  No significant changes to PMH, PSH, FHx, SHx, unless otherwise noted    ALLERGIES & MEDICATIONS  --------------------------------------------------------------------------------  Allergies    No Known Allergies    Intolerances      Standing Inpatient Medications  aspirin  chewable 81 milliGRAM(s) Oral daily  atorvastatin 80 milliGRAM(s) Oral at bedtime  baclofen 5 milliGRAM(s) Oral every 8 hours  benzonatate 100 milliGRAM(s) Oral every 8 hours  caspofungin IVPB 50 milliGRAM(s) IV Intermittent every 24 hours  chlorhexidine 2% Cloths 1 Application(s) Topical <User Schedule>  chlorhexidine 4% Liquid 1 Application(s) Topical <User Schedule>  dextrose 50% Injectable 25 Gram(s) IV Push once  dextrose 50% Injectable 12.5 Gram(s) IV Push once  dextrose 50% Injectable 25 Gram(s) IV Push once  dextrose Oral Gel 15 Gram(s) Oral once  epoetin teena-epbx (RETACRIT) Injectable 5000 Unit(s) IV Push <User Schedule>  glucagon  Injectable 1 milliGRAM(s) IntraMuscular once  hydrocortisone 2.5% Rectal Cream 1 Application(s) Rectal three times a day  hydrocortisone hemorrhoidal Suppository 1 Suppository(s) Rectal daily  insulin glargine Injectable (LANTUS) 18 Unit(s) SubCutaneous at bedtime  insulin lispro (ADMELOG) corrective regimen sliding scale   SubCutaneous three times a day before meals  insulin lispro (ADMELOG) corrective regimen sliding scale   SubCutaneous at bedtime  insulin lispro Injectable (ADMELOG) 7 Unit(s) SubCutaneous three times a day with meals  metoprolol succinate ER 50 milliGRAM(s) Oral daily  multivitamin/minerals/iron Oral Solution (CENTRUM) 15 milliLiter(s) Oral daily  pantoprazole    Tablet 40 milliGRAM(s) Oral before breakfast  polyethylene glycol 3350 17 Gram(s) Oral daily  polyethylene glycol 3350 17 Gram(s) Oral once  psyllium Powder 1 Packet(s) Oral daily  senna 2 Tablet(s) Oral at bedtime  sevelamer carbonate 800 milliGRAM(s) Oral three times a day with meals  thiamine 100 milliGRAM(s) Oral daily    PRN Inpatient Medications  guaiFENesin Oral Liquid (Sugar-Free) 200 milliGRAM(s) Oral every 6 hours PRN  oxycodone    5 mG/acetaminophen 325 mG 1 Tablet(s) Oral every 4 hours PRN  simethicone 80 milliGRAM(s) Chew daily PRN      REVIEW OF SYSTEMS  --------------------------------------------------------------------------------  Constitutional: [ ] Fever [ ] Chills [ ] Fatigue [ ] Weight change   HEENT: [ ] Blurred vision [ ] Eye Pain [ ] Headache [ ] Runny nose [ ] Sore Throat   Respiratory: [ ] Cough [ ] Wheezing [ ] Shortness of breath  Cardiovascular: [ ] Chest Pain [ ] Palpitations [ ] BRUNSON [ ] PND [ ] Orthopnea  Gastrointestinal: [ ] Abdominal Pain [ ] Diarrhea [ ] Constipation [ ] Hemorrhoids [ ] Nausea [ ] Vomiting  Genitourinary: [ ] Nocturia [ ] Dysuria [ ] Incontinence  Extremities: [ ] Swelling [ ] Joint Pain  Neurologic: [ ] Focal deficit [ ] Paresthesias [ ] Syncope  Lymphatic: [ ] Swelling [ ] Lymphadenopathy   Skin: [ ] Rash [ ] Ecchymoses [ ] Wounds [ ] Lesions  Psychiatry: [ ] Depression [ ] Suicidal/Homicidal Ideation [ ] Anxiety [ ] Sleep Disturbances  [x ] 10 point review of systems is otherwise negative except as mentioned above              [ ]Unable to obtain due to   All other systems were reviewed and are negative, except as noted.    VITALS/PHYSICAL EXAM  --------------------------------------------------------------------------------  T(C): 37 (01-24-23 @ 09:30), Max: 37 (01-24-23 @ 09:30)  HR: 86 (01-24-23 @ 09:30) (86 - 97)  BP: 117/70 (01-24-23 @ 09:30) (104/68 - 121/75)  RR: 18 (01-24-23 @ 09:30) (18 - 18)  SpO2: 98% (01-24-23 @ 09:30) (93% - 100%)  Wt(kg): --        01-23-23 @ 07:01  -  01-24-23 @ 07:00  --------------------------------------------------------  IN: 690 mL / OUT: 1050 mL / NET: -360 mL    01-24-23 @ 07:01  -  01-24-23 @ 11:39  --------------------------------------------------------  IN: 480 mL / OUT: 0 mL / NET: 480 mL      Physical Exam:  	Gen: NAD, well-appearing  	HEENT: on room air  	Pulm: CTA B/L  	CV: normal S1S2; no rub  	Abd: soft                      Back : deferred  	: No anand  	LE: no edema  	Skin: Warm  	Vascular access: RIJ tunneled dialysis catheter in situ, with bloody discharge noted at the catheter exit site. also with maturing RUE AVF in situ    LABS/STUDIES  --------------------------------------------------------------------------------              8.5    11.18 >-----------<  193      [01-24-23 @ 01:47]              28.1     135  |  96  |  70  ----------------------------<  170      [01-24-23 @ 01:47]  3.7   |  22  |  3.22        Ca     9.1     [01-24-23 @ 01:47]    TPro  7.8  /  Alb  3.7  /  TBili  0.6  /  DBili  x   /  AST  20  /  ALT  6   /  AlkPhos  92  [01-23-23 @ 06:17]        CK 36      [01-23-23 @ 06:17]    Creatinine Trend:  SCr 3.22 [01-24 @ 01:47]  SCr 3.09 [01-23 @ 06:17]  SCr 2.55 [01-22 @ 10:06]  SCr 3.08 [01-21 @ 05:53]  SCr 2.00 [01-20 @ 00:49]    Urinalysis - [12-26-22 @ 13:19]      Color Yellow / Appearance Clear / SG 1.013 / pH 6.0      Gluc Negative / Ketone Negative  / Bili Negative / Urobili Negative       Blood Large / Protein 100 mg/dL / Leuk Est Moderate / Nitrite Negative      RBC >50 / WBC 35 / Hyaline 1 / Gran  / Sq Epi  / Non Sq Epi 2 / Bacteria Few      Iron 39, TIBC 222, %sat 18      [12-31-22 @ 06:41]  Ferritin 346      [12-31-22 @ 06:42]  TSH 2.42      [01-15-23 @ 06:13]  Lipid: chol --, , HDL --, LDL --      [12-05-22 @ 00:50]    HBsAg Nonreact      [01-23-23 @ 15:11]  HCV 0.11, Nonreact      [01-23-23 @ 15:11]     [facility] DIVISION OF KIDNEY DISEASES AND HYPERTENSION -- PROGRESS NOTE    Chief complaint: ERIC    24 hour events/subjective: seen on HD        PAST HISTORY  --------------------------------------------------------------------------------  No significant changes to PMH, PSH, FHx, SHx, unless otherwise noted    ALLERGIES & MEDICATIONS  --------------------------------------------------------------------------------  Allergies    No Known Allergies    Intolerances      Standing Inpatient Medications  aspirin  chewable 81 milliGRAM(s) Oral daily  atorvastatin 80 milliGRAM(s) Oral at bedtime  baclofen 5 milliGRAM(s) Oral every 8 hours  benzonatate 100 milliGRAM(s) Oral every 8 hours  caspofungin IVPB 50 milliGRAM(s) IV Intermittent every 24 hours  chlorhexidine 2% Cloths 1 Application(s) Topical <User Schedule>  chlorhexidine 4% Liquid 1 Application(s) Topical <User Schedule>  dextrose 50% Injectable 25 Gram(s) IV Push once  dextrose 50% Injectable 12.5 Gram(s) IV Push once  dextrose 50% Injectable 25 Gram(s) IV Push once  dextrose Oral Gel 15 Gram(s) Oral once  epoetin teena-epbx (RETACRIT) Injectable 5000 Unit(s) IV Push <User Schedule>  glucagon  Injectable 1 milliGRAM(s) IntraMuscular once  hydrocortisone 2.5% Rectal Cream 1 Application(s) Rectal three times a day  hydrocortisone hemorrhoidal Suppository 1 Suppository(s) Rectal daily  insulin glargine Injectable (LANTUS) 18 Unit(s) SubCutaneous at bedtime  insulin lispro (ADMELOG) corrective regimen sliding scale   SubCutaneous three times a day before meals  insulin lispro (ADMELOG) corrective regimen sliding scale   SubCutaneous at bedtime  insulin lispro Injectable (ADMELOG) 7 Unit(s) SubCutaneous three times a day with meals  metoprolol succinate ER 50 milliGRAM(s) Oral daily  multivitamin/minerals/iron Oral Solution (CENTRUM) 15 milliLiter(s) Oral daily  pantoprazole    Tablet 40 milliGRAM(s) Oral before breakfast  polyethylene glycol 3350 17 Gram(s) Oral daily  polyethylene glycol 3350 17 Gram(s) Oral once  psyllium Powder 1 Packet(s) Oral daily  senna 2 Tablet(s) Oral at bedtime  sevelamer carbonate 800 milliGRAM(s) Oral three times a day with meals  thiamine 100 milliGRAM(s) Oral daily    PRN Inpatient Medications  guaiFENesin Oral Liquid (Sugar-Free) 200 milliGRAM(s) Oral every 6 hours PRN  oxycodone    5 mG/acetaminophen 325 mG 1 Tablet(s) Oral every 4 hours PRN  simethicone 80 milliGRAM(s) Chew daily PRN      REVIEW OF SYSTEMS  --------------------------------------------------------------------------------  Constitutional: [ ] Fever [ ] Chills [ ] Fatigue [ ] Weight change   HEENT: [ ] Blurred vision [ ] Eye Pain [ ] Headache [ ] Runny nose [ ] Sore Throat   Respiratory: [ ] Cough [ ] Wheezing [ ] Shortness of breath  Cardiovascular: [ ] Chest Pain [ ] Palpitations [ ] BRUNSON [ ] PND [ ] Orthopnea  Gastrointestinal: [ ] Abdominal Pain [ ] Diarrhea [ ] Constipation [ ] Hemorrhoids [ ] Nausea [ ] Vomiting  Genitourinary: [ ] Nocturia [ ] Dysuria [ ] Incontinence  Extremities: [ ] Swelling [ ] Joint Pain  Neurologic: [ ] Focal deficit [ ] Paresthesias [ ] Syncope  Lymphatic: [ ] Swelling [ ] Lymphadenopathy   Skin: [ ] Rash [ ] Ecchymoses [ ] Wounds [ ] Lesions  Psychiatry: [ ] Depression [ ] Suicidal/Homicidal Ideation [ ] Anxiety [ ] Sleep Disturbances  [x ] 10 point review of systems is otherwise negative except as mentioned above              [ ]Unable to obtain due to   All other systems were reviewed and are negative, except as noted.    VITALS/PHYSICAL EXAM  --------------------------------------------------------------------------------  T(C): 37 (01-24-23 @ 09:30), Max: 37 (01-24-23 @ 09:30)  HR: 86 (01-24-23 @ 09:30) (86 - 97)  BP: 117/70 (01-24-23 @ 09:30) (104/68 - 121/75)  RR: 18 (01-24-23 @ 09:30) (18 - 18)  SpO2: 98% (01-24-23 @ 09:30) (93% - 100%)  Wt(kg): --        01-23-23 @ 07:01  -  01-24-23 @ 07:00  --------------------------------------------------------  IN: 690 mL / OUT: 1050 mL / NET: -360 mL    01-24-23 @ 07:01  -  01-24-23 @ 11:39  --------------------------------------------------------  IN: 480 mL / OUT: 0 mL / NET: 480 mL      Physical Exam:  	Gen: NAD, well-appearing  	HEENT: on room air  	Pulm: CTA B/L  	CV: normal S1S2; no rub  	Abd: soft                      Back : deferred  	: No anand  	LE: no edema  	Skin: Warm  	Vascular access: RIJ tunneled dialysis catheter in situ, with bloody discharge noted at the catheter exit site. also with maturing RUE AVF in situ    LABS/STUDIES  --------------------------------------------------------------------------------              8.5    11.18 >-----------<  193      [01-24-23 @ 01:47]              28.1     135  |  96  |  70  ----------------------------<  170      [01-24-23 @ 01:47]  3.7   |  22  |  3.22        Ca     9.1     [01-24-23 @ 01:47]    TPro  7.8  /  Alb  3.7  /  TBili  0.6  /  DBili  x   /  AST  20  /  ALT  6   /  AlkPhos  92  [01-23-23 @ 06:17]        CK 36      [01-23-23 @ 06:17]    Creatinine Trend:  SCr 3.22 [01-24 @ 01:47]  SCr 3.09 [01-23 @ 06:17]  SCr 2.55 [01-22 @ 10:06]  SCr 3.08 [01-21 @ 05:53]  SCr 2.00 [01-20 @ 00:49]    Urinalysis - [12-26-22 @ 13:19]      Color Yellow / Appearance Clear / SG 1.013 / pH 6.0      Gluc Negative / Ketone Negative  / Bili Negative / Urobili Negative       Blood Large / Protein 100 mg/dL / Leuk Est Moderate / Nitrite Negative      RBC >50 / WBC 35 / Hyaline 1 / Gran  / Sq Epi  / Non Sq Epi 2 / Bacteria Few      Iron 39, TIBC 222, %sat 18      [12-31-22 @ 06:41]  Ferritin 346      [12-31-22 @ 06:42]  TSH 2.42      [01-15-23 @ 06:13]  Lipid: chol --, , HDL --, LDL --      [12-05-22 @ 00:50]    HBsAg Nonreact      [01-23-23 @ 15:11]  HCV 0.11, Nonreact      [01-23-23 @ 15:11]     [facility] DIVISION OF KIDNEY DISEASES AND HYPERTENSION -- PROGRESS NOTE    Chief complaint: ERIC    24 hour events/subjective: seen on HD        PAST HISTORY  --------------------------------------------------------------------------------  No significant changes to PMH, PSH, FHx, SHx, unless otherwise noted    ALLERGIES & MEDICATIONS  --------------------------------------------------------------------------------  Allergies    No Known Allergies    Intolerances      Standing Inpatient Medications  aspirin  chewable 81 milliGRAM(s) Oral daily  atorvastatin 80 milliGRAM(s) Oral at bedtime  baclofen 5 milliGRAM(s) Oral every 8 hours  benzonatate 100 milliGRAM(s) Oral every 8 hours  caspofungin IVPB 50 milliGRAM(s) IV Intermittent every 24 hours  chlorhexidine 2% Cloths 1 Application(s) Topical <User Schedule>  chlorhexidine 4% Liquid 1 Application(s) Topical <User Schedule>  dextrose 50% Injectable 25 Gram(s) IV Push once  dextrose 50% Injectable 12.5 Gram(s) IV Push once  dextrose 50% Injectable 25 Gram(s) IV Push once  dextrose Oral Gel 15 Gram(s) Oral once  epoetin teena-epbx (RETACRIT) Injectable 5000 Unit(s) IV Push <User Schedule>  glucagon  Injectable 1 milliGRAM(s) IntraMuscular once  hydrocortisone 2.5% Rectal Cream 1 Application(s) Rectal three times a day  hydrocortisone hemorrhoidal Suppository 1 Suppository(s) Rectal daily  insulin glargine Injectable (LANTUS) 18 Unit(s) SubCutaneous at bedtime  insulin lispro (ADMELOG) corrective regimen sliding scale   SubCutaneous three times a day before meals  insulin lispro (ADMELOG) corrective regimen sliding scale   SubCutaneous at bedtime  insulin lispro Injectable (ADMELOG) 7 Unit(s) SubCutaneous three times a day with meals  metoprolol succinate ER 50 milliGRAM(s) Oral daily  multivitamin/minerals/iron Oral Solution (CENTRUM) 15 milliLiter(s) Oral daily  pantoprazole    Tablet 40 milliGRAM(s) Oral before breakfast  polyethylene glycol 3350 17 Gram(s) Oral daily  polyethylene glycol 3350 17 Gram(s) Oral once  psyllium Powder 1 Packet(s) Oral daily  senna 2 Tablet(s) Oral at bedtime  sevelamer carbonate 800 milliGRAM(s) Oral three times a day with meals  thiamine 100 milliGRAM(s) Oral daily    PRN Inpatient Medications  guaiFENesin Oral Liquid (Sugar-Free) 200 milliGRAM(s) Oral every 6 hours PRN  oxycodone    5 mG/acetaminophen 325 mG 1 Tablet(s) Oral every 4 hours PRN  simethicone 80 milliGRAM(s) Chew daily PRN      REVIEW OF SYSTEMS  --------------------------------------------------------------------------------  Constitutional: [ ] Fever [ ] Chills [ ] Fatigue [ ] Weight change   HEENT: [ ] Blurred vision [ ] Eye Pain [ ] Headache [ ] Runny nose [ ] Sore Throat   Respiratory: [ ] Cough [ ] Wheezing [ ] Shortness of breath  Cardiovascular: [ ] Chest Pain [ ] Palpitations [ ] BRUNSON [ ] PND [ ] Orthopnea  Gastrointestinal: [ ] Abdominal Pain [ ] Diarrhea [ ] Constipation [ ] Hemorrhoids [ ] Nausea [ ] Vomiting  Genitourinary: [ ] Nocturia [ ] Dysuria [ ] Incontinence  Extremities: [ ] Swelling [ ] Joint Pain  Neurologic: [ ] Focal deficit [ ] Paresthesias [ ] Syncope  Lymphatic: [ ] Swelling [ ] Lymphadenopathy   Skin: [ ] Rash [ ] Ecchymoses [ ] Wounds [ ] Lesions  Psychiatry: [ ] Depression [ ] Suicidal/Homicidal Ideation [ ] Anxiety [ ] Sleep Disturbances  [x ] 10 point review of systems is otherwise negative except as mentioned above              [ ]Unable to obtain due to   All other systems were reviewed and are negative, except as noted.    VITALS/PHYSICAL EXAM  --------------------------------------------------------------------------------  T(C): 37 (01-24-23 @ 09:30), Max: 37 (01-24-23 @ 09:30)  HR: 86 (01-24-23 @ 09:30) (86 - 97)  BP: 117/70 (01-24-23 @ 09:30) (104/68 - 121/75)  RR: 18 (01-24-23 @ 09:30) (18 - 18)  SpO2: 98% (01-24-23 @ 09:30) (93% - 100%)  Wt(kg): --        01-23-23 @ 07:01  -  01-24-23 @ 07:00  --------------------------------------------------------  IN: 690 mL / OUT: 1050 mL / NET: -360 mL    01-24-23 @ 07:01  -  01-24-23 @ 11:39  --------------------------------------------------------  IN: 480 mL / OUT: 0 mL / NET: 480 mL      Physical Exam:  	Gen: NAD, well-appearing  	HEENT: on room air  	Pulm: CTA B/L  	CV: normal S1S2; no rub  	Abd: soft                      Back : deferred  	: No anand  	LE: no edema  	Skin: Warm  	Vascular access: RIJ tunneled dialysis catheter in situ, with bloody discharge noted at the catheter exit site. also with maturing RUE AVF in situ    LABS/STUDIES  --------------------------------------------------------------------------------              8.5    11.18 >-----------<  193      [01-24-23 @ 01:47]              28.1     135  |  96  |  70  ----------------------------<  170      [01-24-23 @ 01:47]  3.7   |  22  |  3.22        Ca     9.1     [01-24-23 @ 01:47]    TPro  7.8  /  Alb  3.7  /  TBili  0.6  /  DBili  x   /  AST  20  /  ALT  6   /  AlkPhos  92  [01-23-23 @ 06:17]        CK 36      [01-23-23 @ 06:17]    Creatinine Trend:  SCr 3.22 [01-24 @ 01:47]  SCr 3.09 [01-23 @ 06:17]  SCr 2.55 [01-22 @ 10:06]  SCr 3.08 [01-21 @ 05:53]  SCr 2.00 [01-20 @ 00:49]    Urinalysis - [12-26-22 @ 13:19]      Color Yellow / Appearance Clear / SG 1.013 / pH 6.0      Gluc Negative / Ketone Negative  / Bili Negative / Urobili Negative       Blood Large / Protein 100 mg/dL / Leuk Est Moderate / Nitrite Negative      RBC >50 / WBC 35 / Hyaline 1 / Gran  / Sq Epi  / Non Sq Epi 2 / Bacteria Few      Iron 39, TIBC 222, %sat 18      [12-31-22 @ 06:41]  Ferritin 346      [12-31-22 @ 06:42]  TSH 2.42      [01-15-23 @ 06:13]  Lipid: chol --, , HDL --, LDL --      [12-05-22 @ 00:50]    HBsAg Nonreact      [01-23-23 @ 15:11]  HCV 0.11, Nonreact      [01-23-23 @ 15:11]

## 2023-01-24 NOTE — PROVIDER CONTACT NOTE (CHANGE IN STATUS NOTIFICATION) - ACTION/TREATMENT ORDERED:
MD Story made aware and assessed pt at bedside and placed pt on NRB mask and transitioned to HFNC 40L / 40% fi02. Pt became tachypneic and hypoxic and pt placed on Bipap 85% fi02 and satting 100%. Pt denies any SOB.
Dr. Carr and BROOKE Velez at the bedside to assess patient. CBC, PTT and TEG labs sent. Will continue to monitor. Safety maintained.
Pt endorsed to primary JESSICA Moore, and transfusion reaction protocol initiated by primary team.

## 2023-01-24 NOTE — PROGRESS NOTE ADULT - NUTRITIONAL ASSESSMENT
This patient has been assessed with a concern for Malnutrition and has been determined to have a diagnosis/diagnoses of Severe protein-calorie malnutrition.    This patient is being managed with:   Diet NPO with Tube Feed-  Tube Feeding Modality: Nasogastric  Glucerna 1.2 Johnathon (GLUCERNARTH)  Total Volume for 24 Hours (mL): 1560  Continuous  Starting Tube Feed Rate {mL per Hour}: 65  Increase Tube Feed Rate by (mL): 10     Every hour  Until Goal Tube Feed Rate (mL per Hour): 65  Tube Feed Duration (in Hours): 24  Tube Feed Start Time: 14:25  No Carb Prosource (1pkg = 15gms Protein)     Qty per Day:  2  Entered: Dec 30 2022 12:11PM    
This patient has been assessed with a concern for Malnutrition and has been determined to have a diagnosis/diagnoses of Severe protein-calorie malnutrition.    This patient is being managed with:   Diet NPO with Tube Feed-  Tube Feeding Modality: Nasogastric  Glucerna 1.2 Johnathon (GLUCERNARTH)  Total Volume for 24 Hours (mL): 1560  Continuous  Starting Tube Feed Rate {mL per Hour}: 65  Increase Tube Feed Rate by (mL): 10     Every hour  Until Goal Tube Feed Rate (mL per Hour): 65  Tube Feed Duration (in Hours): 24  Tube Feed Start Time: 14:25  No Carb Prosource (1pkg = 15gms Protein)     Qty per Day:  2  Entered: Dec 30 2022 12:11PM    
This patient has been assessed with a concern for Malnutrition and has been determined to have a diagnosis/diagnoses of Severe protein-calorie malnutrition.    This patient is being managed with:   Diet NPO with Tube Feed-  Tube Feeding Modality: Nasogastric  Vital 1.5 Johnathon (VITAL1.5RTH)  Total Volume for 24 Hours (mL): 480  Continuous  Starting Tube Feed Rate {mL per Hour}: 20  Until Goal Tube Feed Rate (mL per Hour): 20  Tube Feed Duration (in Hours): 24  Tube Feed Start Time: 11:20  Entered: Dec  3 2022 11:17AM    
This patient has been assessed with a concern for Malnutrition and has been determined to have a diagnosis/diagnoses of Severe protein-calorie malnutrition.    This patient is being managed with:   Diet NPO-  NPO for Procedure/Test     NPO Start Date: 16-Dec-2022   NPO Start Time: 12:00  Entered: Dec 16 2022 11:55AM    
This patient has been assessed with a concern for Malnutrition and has been determined to have a diagnosis/diagnoses of Severe protein-calorie malnutrition.    This patient is being managed with:   Diet NPO with Tube Feed-  Tube Feeding Modality: Nasogastric  Glucerna 1.2 Johnathon (GLUCERNARTH)  Total Volume for 24 Hours (mL): 1560  Continuous  Starting Tube Feed Rate {mL per Hour}: 65  Increase Tube Feed Rate by (mL): 10     Every hour  Until Goal Tube Feed Rate (mL per Hour): 65  Tube Feed Duration (in Hours): 24  Tube Feed Start Time: 14:25  No Carb Prosource (1pkg = 15gms Protein)     Qty per Day:  2  Entered: Dec 30 2022 12:11PM    
This patient has been assessed with a concern for Malnutrition and has been determined to have a diagnosis/diagnoses of Severe protein-calorie malnutrition.    This patient is being managed with:   Diet NPO-  Entered: Dec  1 2022  4:11AM    
This patient has been assessed with a concern for Malnutrition and has been determined to have a diagnosis/diagnoses of Severe protein-calorie malnutrition.    This patient is being managed with:   Diet NPO with Tube Feed-  Tube Feeding Modality: Nasogastric  Glucerna 1.2 Johnathon (GLUCERNARTH)  Total Volume for 24 Hours (mL): 1560  Continuous  Starting Tube Feed Rate {mL per Hour}: 30  Increase Tube Feed Rate by (mL): 10     Every hour  Until Goal Tube Feed Rate (mL per Hour): 65  Tube Feed Duration (in Hours): 24  Tube Feed Start Time: 14:25  No Carb Prosource (1pkg = 15gms Protein)     Qty per Day:  2  Entered: Dec 17 2022  7:58AM    
This patient has been assessed with a concern for Malnutrition and has been determined to have a diagnosis/diagnoses of Severe protein-calorie malnutrition.    This patient is being managed with:   Diet Consistent Carbohydrate/No Snacks-  For patients receiving Renal Replacement - No Protein Restr No Conc K No Conc Phos Low Sodium (RENAL)  Liquid Protein Supplement     Qty per Day:  3  Supplement Feeding Modality:  Oral  Nepro Cans or Servings Per Day:  2       Frequency:  Daily  Entered: Jan 20 2023  4:54PM    
This patient has been assessed with a concern for Malnutrition and has been determined to have a diagnosis/diagnoses of Severe protein-calorie malnutrition.    This patient is being managed with:   Diet NPO with Tube Feed-  Tube Feeding Modality: Nasogastric  Glucerna 1.2 Johnathon (GLUCERNARTH)  Total Volume for 24 Hours (mL): 1560  Continuous  Starting Tube Feed Rate {mL per Hour}: 30  Increase Tube Feed Rate by (mL): 10     Every hour  Until Goal Tube Feed Rate (mL per Hour): 65  Tube Feed Duration (in Hours): 24  Tube Feed Start Time: 14:25  No Carb Prosource (1pkg = 15gms Protein)     Qty per Day:  2  Entered: Dec 17 2022  7:58AM    
This patient has been assessed with a concern for Malnutrition and has been determined to have a diagnosis/diagnoses of Severe protein-calorie malnutrition.    This patient is being managed with:   Diet NPO with Tube Feed-  Tube Feeding Modality: Nasogastric  Glucerna 1.2 Johnathon (GLUCERNARTH)  Total Volume for 24 Hours (mL): 1560  Continuous  Starting Tube Feed Rate {mL per Hour}: 65  Increase Tube Feed Rate by (mL): 10     Every hour  Until Goal Tube Feed Rate (mL per Hour): 65  Tube Feed Duration (in Hours): 24  Tube Feed Start Time: 14:25  No Carb Prosource (1pkg = 15gms Protein)     Qty per Day:  2  Entered: Dec 30 2022 12:11PM    
This patient has been assessed with a concern for Malnutrition and has been determined to have a diagnosis/diagnoses of Severe protein-calorie malnutrition.    This patient is being managed with:   Diet Consistent Carbohydrate/No Snacks-  For patients receiving Renal Replacement - No Protein Restr No Conc K No Conc Phos Low Sodium (RENAL)  Liquid Protein Supplement     Qty per Day:  3  Supplement Feeding Modality:  Oral  Nepro Cans or Servings Per Day:  2       Frequency:  Daily  Entered: Jan 20 2023  4:54PM    
This patient has been assessed with a concern for Malnutrition and has been determined to have a diagnosis/diagnoses of Severe protein-calorie malnutrition.    This patient is being managed with:   Diet NPO after Midnight-     NPO Start Date: 19-Jan-2023   NPO Start Time: 23:59  Entered: Jan 19 2023 11:53AM    Diet Consistent Carbohydrate/No Snacks-  For patients receiving Renal Replacement - No Protein Restr No Conc K No Conc Phos Low Sodium (RENAL)  Liquid Protein Supplement     Qty per Day:  3  Supplement Feeding Modality:  Oral  Nepro Cans or Servings Per Day:  2       Frequency:  Daily  Entered: Jan 14 2023  9:34AM    
This patient has been assessed with a concern for Malnutrition and has been determined to have a diagnosis/diagnoses of Severe protein-calorie malnutrition.    This patient is being managed with:   Diet Consistent Carbohydrate/No Snacks-  For patients receiving Renal Replacement - No Protein Restr No Conc K No Conc Phos Low Sodium (RENAL)  Liquid Protein Supplement     Qty per Day:  3  Supplement Feeding Modality:  Oral  Nepro Cans or Servings Per Day:  2       Frequency:  Daily  Entered: Jan 14 2023  9:34AM    
This patient has been assessed with a concern for Malnutrition and has been determined to have a diagnosis/diagnoses of Severe protein-calorie malnutrition.    This patient is being managed with:   Diet Consistent Carbohydrate/No Snacks-  For patients receiving Renal Replacement - No Protein Restr No Conc K No Conc Phos Low Sodium (RENAL)  Liquid Protein Supplement     Qty per Day:  3  Supplement Feeding Modality:  Oral  Nepro Cans or Servings Per Day:  2       Frequency:  Daily  Entered: Jan 14 2023  9:34AM    
This patient has been assessed with a concern for Malnutrition and has been determined to have a diagnosis/diagnoses of Severe protein-calorie malnutrition.    This patient is being managed with:   Diet Consistent Carbohydrate/No Snacks-  For patients receiving Renal Replacement - No Protein Restr No Conc K No Conc Phos Low Sodium (RENAL)  Liquid Protein Supplement     Qty per Day:  3  Supplement Feeding Modality:  Oral  Nepro Cans or Servings Per Day:  2       Frequency:  Daily  Entered: Jan 20 2023  4:54PM    
This patient has been assessed with a concern for Malnutrition and has been determined to have a diagnosis/diagnoses of Severe protein-calorie malnutrition.    This patient is being managed with:   Diet Consistent Carbohydrate/No Snacks-  For patients receiving Renal Replacement - No Protein Restr No Conc K No Conc Phos Low Sodium (RENAL)  Liquid Protein Supplement     Qty per Day:  3  Supplement Feeding Modality:  Oral  Nepro Cans or Servings Per Day:  2       Frequency:  Daily  Entered: Jan 14 2023  9:34AM    
This patient has been assessed with a concern for Malnutrition and has been determined to have a diagnosis/diagnoses of Severe protein-calorie malnutrition.    This patient is being managed with:   Diet NPO-  Entered: Dec  1 2022  4:11AM    
This patient has been assessed with a concern for Malnutrition and has been determined to have a diagnosis/diagnoses of Severe protein-calorie malnutrition.    This patient is being managed with:   Diet NPO after Midnight-     NPO Start Date: 19-Jan-2023   NPO Start Time: 23:59  Entered: Jan 19 2023 11:53AM    Diet Consistent Carbohydrate/No Snacks-  For patients receiving Renal Replacement - No Protein Restr No Conc K No Conc Phos Low Sodium (RENAL)  Liquid Protein Supplement     Qty per Day:  3  Supplement Feeding Modality:  Oral  Nepro Cans or Servings Per Day:  2       Frequency:  Daily  Entered: Jan 14 2023  9:34AM    
This patient has been assessed with a concern for Malnutrition and has been determined to have a diagnosis/diagnoses of Severe protein-calorie malnutrition.    This patient is being managed with:   Diet Consistent Carbohydrate/No Snacks-  For patients receiving Renal Replacement - No Protein Restr No Conc K No Conc Phos Low Sodium (RENAL)  Liquid Protein Supplement     Qty per Day:  3  Supplement Feeding Modality:  Oral  Nepro Cans or Servings Per Day:  2       Frequency:  Daily  Entered: Jan 14 2023  9:34AM    
This patient has been assessed with a concern for Malnutrition and has been determined to have a diagnosis/diagnoses of Severe protein-calorie malnutrition.    This patient is being managed with:   Diet Consistent Carbohydrate/No Snacks-  For patients receiving Renal Replacement - No Protein Restr No Conc K No Conc Phos Low Sodium (RENAL)  Liquid Protein Supplement     Qty per Day:  3  Supplement Feeding Modality:  Oral  Nepro Cans or Servings Per Day:  2       Frequency:  Daily  Entered: Jan 20 2023  4:54PM    
This patient has been assessed with a concern for Malnutrition and has been determined to have a diagnosis/diagnoses of Severe protein-calorie malnutrition.    This patient is being managed with:   Diet Consistent Carbohydrate/No Snacks-  For patients receiving Renal Replacement - No Protein Restr No Conc K No Conc Phos Low Sodium (RENAL)  Liquid Protein Supplement     Qty per Day:  3  Supplement Feeding Modality:  Oral  Nepro Cans or Servings Per Day:  2       Frequency:  Daily  Entered: Jan 14 2023  9:34AM    
This patient has been assessed with a concern for Malnutrition and has been determined to have a diagnosis/diagnoses of Severe protein-calorie malnutrition.    This patient is being managed with:   Diet Consistent Carbohydrate/No Snacks-  Entered: Teo 10 2023  3:50PM

## 2023-01-24 NOTE — PROGRESS NOTE ADULT - PROVIDER SPECIALTY LIST ADULT
Critical Care
ECMO
ENT
Gastroenterology
Heart Failure
Heart Failure
Infectious Disease
Nephrology
Neurology
Trauma Surgery
Trauma Surgery
Vascular Surgery
Critical Care
ENT
ENT
Heart Failure
Heart Failure
Infectious Disease
Palliative Care
SAVANNAH
Vascular Surgery
CT Surgery
Critical Care
Nephrology
Structural Heart
Critical Care
Endocrinology
Heart Failure
Infectious Disease
Nephrology
SICU
Trauma Surgery
Critical Care
Critical Care
Endocrinology
Infectious Disease
Infectious Disease
Nephrology
Critical Care
Critical Care
Rehab Medicine
Critical Care
ECMO
ECMO
ENT
ENT
Heart Failure
Infectious Disease
Nephrology
Neurology
Trauma Surgery
Infectious Disease
Infectious Disease
Nephrology
Trauma Surgery
Cardiology
Vascular Surgery
Cardiology
Infectious Disease
Intervent Radiology
Nephrology
ENT
CT Surgery
ENT
Endocrinology
Nephrology
Endocrinology
Nephrology
Nephrology
CT Surgery
ENT
ENT
Endocrinology
Nephrology
CT Surgery
Critical Care
Nephrology
Palliative Care
Heart Failure
Endocrinology
Heart Failure
Nephrology
CT Surgery
CT Surgery
Endocrinology
Nephrology
Nephrology
Heart Failure
Nephrology
Heart Failure
CT Surgery
Heart Failure
Heart Failure
CT Surgery
Endocrinology
Heart Failure
Heart Failure
CT Surgery
Heart Failure
CT Surgery
Heart Failure
CT Surgery
Heart Failure
Infectious Disease
CT Surgery

## 2023-01-24 NOTE — PROVIDER CONTACT NOTE (OTHER) - SITUATION
Patient had completed blood transfusion 1 unit of PRBC while having Hemodialysis at bedside.  Noted chills and Rigors 30 minutes post transfusion end.
Patient had continuos hiccups with SOB. Post-op mitral clip on 12/16/2022.   Patient on dialysis.  Left AVF placed on 01/20/2023, patient was dialyzed today.

## 2023-01-24 NOTE — DISCHARGE NOTE NURSING/CASE MANAGEMENT/SOCIAL WORK - NSDCCRTYPESERV_GEN_ALL_CORE_FT
You have been assigned a Tues/Thur/Sat 6am chair time.  Outpatient HD to begin Thursday 1/26/23 at 10am for intake and registration. Please call center with any questions or concerns

## 2023-01-24 NOTE — PROGRESS NOTE ADULT - PROBLEM SELECTOR PLAN 2
in the setting of oliguric ERIC. On MIGUEL, and receiving a unit of PRBC with HD today as per primary team. MOnitor CBC

## 2023-01-24 NOTE — SWALLOW VFSS/MBS ASSESSMENT ADULT - ORAL PHASE
within functional limits Trace uncontrolled spillover to the pyriform sinus; single instance of trace penetration before the swallow from the laryngeal surface of the epiglottis with complete retrieval

## 2023-01-24 NOTE — PROGRESS NOTE ADULT - PROBLEM SELECTOR PLAN 2
- was on CVVH now on HD T/Th /Sat   - appreciate nephrology recommendations   - avoid nephrotoxins  - s/p RIJ permacath 1/11  - s/p 1/20  vasc sx  RUE AV fistula   [facility] plan to begin Thursday

## 2023-01-24 NOTE — SWALLOW VFSS/MBS ASSESSMENT ADULT - SLP GENERAL OBSERVATIONS
Pt was received in radiology in RACHEAL chair. +room air. He was pleasant and cooperative. Pending d/c home today.

## 2023-01-24 NOTE — PROGRESS NOTE ADULT - PROBLEM SELECTOR PLAN 4
continue asa/ statin/ bb and amio 200 qd  HD as per renal  rt groin vac d/c today- no longer necessary  continue Caspofungin  per Dr. Rodrigues-d/c on day of discharge   nutritional optimization; ck prealbumin --> 21  Proamatine d/c   discharge planning- acute rehab recommended; pt wants to home with PT - await HD placement

## 2023-01-24 NOTE — PROGRESS NOTE ADULT - PROBLEM SELECTOR PLAN 1
TTE 1/9 showing EF 25%  Continue on Amio 200 mg PO daily    f/u CHF as an outpatient on 2/9 at 9:30;  toprol 50 qd  HD as per renal- outpatient HD placement approved  Poss transfusion reaction this afternoon>protocol followed  DC delayed until tomorrow  tylenol for rigors

## 2023-01-24 NOTE — SWALLOW VFSS/MBS ASSESSMENT ADULT - SLP PERTINENT HISTORY OF CURRENT PROBLEM
61 y/o M with PMH of CABG, DM, HTN, and HLD presenting 11/24/22 as transfer from [place] for c/f STEMI. Patient went into cardiac arrest 11/24 and ACLS was started. Intubated, placed on ECMO 11/25. Nsx consulted. CTH 4mm tentorial SDH. Intubated, perrl, FC x 4 w/ sedation held. No nsgy intervention. Palliative Care following. Nephrology following for ERIC. s/p ECMO removal 12/8. Neuro consulted 12/12. Impression: Acute change in mentation and functional status with R gaze preference, generalized shivering, not following commands, concerning for possible acute ischemic event vs encephalopathy vs seizure iso critically ill patient with multiple comorbidities. 12/13: IABP in place. On CVVHD. Mental status is improving. s/p trach and Mitral clip inserted in OR 12/16. S/P #7 Proximal XLT Tracheostomy to vent. s/p IABP removal 12/17. Intermittently requiring HD. 61 y/o M with PMH of CABG, DM, HTN, and HLD presenting 11/24/22 as transfer from [place] for c/f STEMI. Patient went into cardiac arrest 11/24 and ACLS was started. Intubated, placed on ECMO 11/25. Nsx consulted. CTH 4mm tentorial SDH. Intubated, perrl, FC x 4 w/ sedation held. No nsgy intervention. Palliative Care following. Nephrology following for ERIC. s/p ECMO removal 12/8. Neuro consulted 12/12. Impression: Acute change in mentation and functional status with R gaze preference, generalized shivering, not following commands, concerning for possible acute ischemic event vs encephalopathy vs seizure iso critically ill patient with multiple comorbidities. 12/13: IABP in place. On CVVHD. Mental status is improving. s/p trach and Mitral clip inserted in OR 12/16. S/P #7 Proximal XLT Tracheostomy to vent. s/p IABP removal 12/17. Intermittently requiring HD. 63 y/o M with PMH of CABG, DM, HTN, and HLD presenting 11/24/22 as transfer from [place] for c/f STEMI. Patient went into cardiac arrest 11/24 and ACLS was started. Intubated, placed on ECMO 11/25. Nsx consulted. CTH 4mm tentorial SDH. Intubated, perrl, FC x 4 w/ sedation held. No nsgy intervention. Palliative Care following. Nephrology following for ERIC. s/p ECMO removal 12/8. Neuro consulted 12/12. Impression: Acute change in mentation and functional status with R gaze preference, generalized shivering, not following commands, concerning for possible acute ischemic event vs encephalopathy vs seizure iso critically ill patient with multiple comorbidities. 12/13: IABP in place. On CVVHD. Mental status is improving. s/p trach and Mitral clip inserted in OR 12/16. S/P #7 Proximal XLT Tracheostomy to vent. s/p IABP removal 12/17. Intermittently requiring HD.

## 2023-01-24 NOTE — SWALLOW VFSS/MBS ASSESSMENT ADULT - ROSENBEK'S PENETRATION ASPIRATION SCALE
(2) contrast enters airway, remains above the vocal cords, no residue remains (penetration) (1) no aspiration, contrast does not enter airway The patient is a 65y Female complaining of

## 2023-01-24 NOTE — PROGRESS NOTE ADULT - PROBLEM/PLAN-1
DISPLAY PLAN FREE TEXT

## 2023-01-24 NOTE — PROVIDER CONTACT NOTE (CHANGE IN STATUS NOTIFICATION) - BACKGROUND
Patient is a 63 y/o male with PMH of CABG, DM, HTN, HLD presenting as transfer from [place] with initial c/f NSTEMI, given ASA and Plavix and transferred here. At [facility] found to have acute pancreatitis 2/2 gallstones, moderate on CT. SICU consulted due to increased troponins and concern for ACS. Patient is a 61 y/o male with PMH of CABG, DM, HTN, HLD presenting as transfer from [place] with initial c/f NSTEMI, given ASA and Plavix and transferred here. At [facility] found to have acute pancreatitis 2/2 gallstones, moderate on CT. SICU consulted due to increased troponins and concern for ACS. Patient is a 63 y/o male with PMH of CABG, DM, HTN, HLD presenting as transfer from [place] with initial c/f NSTEMI, given ASA and Plavix and transferred here. At [facility] found to have acute pancreatitis 2/2 gallstones, moderate on CT. SICU consulted due to increased troponins and concern for ACS.

## 2023-01-24 NOTE — PROGRESS NOTE ADULT - ASSESSMENT
Assessment  DMT2: 62y Male with DM T2 A1C 7.3% with hyperglycemia admitted with CAD, patient is on basal bolus insulin coverage, sugars not antonio target, eating meals, planing DC home.  CAD: S/P CABG, on medications, monitored, asymptomatic.  HTN: On antihypertensive medications, monitored, asymptomatic.              Nayeli Quiles MD  Cell:  [phone]  Office: [phone]               Assessment  DMT2: 62y Male with DM T2 A1C 7.3% with hyperglycemia admitted with CAD, patient is on basal bolus insulin coverage, sugars not antonio target, eating meals, planing DC home.  CAD: S/P CABG, on medications, monitored, asymptomatic.  HTN: On antihypertensive medications, monitored, asymptomatic.              Nayeli Quiles MD  Cell:  [phone]  Office: [phone]               Assessment  DMT2: 62y Male with DM T2 A1C 7.3% with hyperglycemia admitted with CAD, patient is on basal bolus insulin coverage, sugars not antonio target, eating meals, planing DC home.  CAD: S/P CABG, on medications, monitored, asymptomatic.  HTN: On antihypertensive medications, monitored, asymptomatic.              Nayeli Quiles MD  Cell:  [phone]  Office: [phone]

## 2023-01-24 NOTE — PROVIDER CONTACT NOTE (OTHER) - ASSESSMENT
Pt. V/S stable, Afebrile. Alert and oriented X4 . Denies chest pain/ discomfort. No shortness of breath. BP= 102/56 Temp. 36.5 HR= 85/min  POX= 95-99% room air. Hemodialysis done at bedside via Right upper chest wall Permacath.
On assessment, BP: 118/72, Pulse: 87, saturating 100% on RA.

## 2023-01-24 NOTE — PROVIDER CONTACT NOTE (CHANGE IN STATUS NOTIFICATION) - NAME OF MD/NP/PA/DO NOTIFIED:
Pt hypoxic on 4L via NC, satting 88%. MD Story made aware and assessed pt at bedside
NP. Preeti Leroy
Dr. Carr & BROOKE Velez

## 2023-01-24 NOTE — PROVIDER CONTACT NOTE (CHANGE IN STATUS NOTIFICATION) - SITUATION
Pt hypoxic on 4L via NC, satting 88%
Pt transfused 1PRBC during HD tx. After HD tx finished pt started having chills.
Patient with [facility] ECMO- B/L femoral cannulations in place.

## 2023-01-24 NOTE — PROGRESS NOTE ADULT - PROBLEM SELECTOR PLAN 1
Will increase Lantus to 18 units at bed time.  Will increase Admelog to 7 units before each meal in addition to Admelog correction scale coverage.  Patient counseled for compliance with consistent low carb diet.  .     May get discharged on  Lantus 18u qhs qHS and Trajenta 5 mg PO daily. Discontinue all other prior home diabetes medication. Follow up endocrinology 2 weeks.

## 2023-01-24 NOTE — PROGRESS NOTE ADULT - PROBLEM SELECTOR PLAN 5
Endo following  accuchecks ac and hs  dm diet  lantus 16 units qhs and trajenta 5 mg po qd for discharge as per endo

## 2023-01-24 NOTE — DISCHARGE NOTE NURSING/CASE MANAGEMENT/SOCIAL WORK - PATIENT PORTAL LINK FT
You can access the FollowMyHealth Patient Portal offered by [facility] by registering at the following website: [email or web address]. By joining [employer]’s FollowMyHealth portal, you will also be able to view your health information using other applications (apps) compatible with our system. You can access the FollowMyHealth Patient Portal offered by [facility] by registering at the following website: [email or web address]. By joining [facility]’s FollowMyHealth portal, you will also be able to view your health information using other applications (apps) compatible with our system. You can access the FollowMyHealth Patient Portal offered by [facility] by registering at the following website: [email or web address]. By joining [employer]’s FollowMyHealth portal, you will also be able to view your health information using other applications (apps) compatible with our system.

## 2023-01-24 NOTE — PROVIDER CONTACT NOTE (OTHER) - ACTION/TREATMENT ORDERED:
Patient safety maintain.
Empty bag of PRBC with blood tubings, Transfusion documentation Form completed and Blood specimen for Type and Cross Stat sent to Blood Bank. Tylenol 650mg po given as ordered. Continue monitor pt.

## 2023-01-24 NOTE — PROGRESS NOTE ADULT - PROBLEM SELECTOR PLAN 3
in the setting of ERIC. Recent phos level at goal. continue sevelamer. Monitor Ca, phos levels.      Pt. needs to follow up with me within 3-4 weeks of discharge from the [facility].   Isabella Staruss MD  Office : [phone]  Contact me on Microsoft Teams. in the setting of ERIC. Recent phos level at goal. continue sevelamer. Monitor Ca, phos levels.      Pt. needs to follow up with me within 3-4 weeks of discharge from the [facility].   Isabella Strauss MD  Office : [phone]  Contact me on Microsoft Teams. in the setting of ERIC. Recent phos level at goal. continue sevelamer. Monitor Ca, phos levels.      Pt. needs to follow up with me within 3-4 weeks of discharge from the [facility].   Isabella Strauss MD  Office : [phone]  Contact me on Microsoft Teams.

## 2023-01-24 NOTE — PROVIDER CONTACT NOTE (CHANGE IN STATUS NOTIFICATION) - RECOMMENDATIONS
Dr. Carr and BROOKE Velez to the bedside to assess patient. Recheck PTT.
Follow the transfusion reaction protocol.
MD Story made aware and assessed pt at bedside and placed pt on NRB mask and transitioned to HFNC 40L / 40% fi02. Pt became tachypneic and hypoxic and pt placed on Bipap 85% fi02 and satting 100%. Pt denies any SOB.

## 2023-01-24 NOTE — PROVIDER CONTACT NOTE (OTHER) - BACKGROUND
Patient was on ECMO / IABP  on 12/08/ 2022, had a trach, passed FEEST test on 01/10/2023 .
Patient admitted with Pancreatitis, elevated troponin, elevated lipase, nausea and vomiting and abdominal pain. 11/25 DX; PEA, pt. was put in ECMO and was decannulated 12/8. 12/16 S/P Mitral clip.

## 2023-01-24 NOTE — DISCHARGE NOTE NURSING/CASE MANAGEMENT/SOCIAL WORK - NSDCPEFALRISK_GEN_ALL_CORE
For information on Fall & Injury Prevention, visit: [email or web address].[email or web address]/[email or web address]-[email or web address]-maintains-health-[email or web address]-mobility OR  https://www.[email or web address]/news/fall-prevention-tips-to-avoid-injury OR  https://www.cdc.gov/steadi/patient.html For information on Fall & Injury Prevention, visit: https://www.[email or web address]/news/fall-prevention-protects-and-maintains-health-and-mobility OR  https://www.[email or web address]/news/fall-prevention-tips-to-avoid-injury OR  https://www.cdc.gov/steadi/patient.html For information on Fall & Injury Prevention, visit: https://www.[facility]/news/fall-prevention-protects-and-maintains-health-and-mobility OR  https://www.[facility]/news/fall-prevention-tips-to-avoid-injury OR  https://www.cdc.gov/steadi/patient.html

## 2023-01-24 NOTE — PROGRESS NOTE ADULT - PROBLEM SELECTOR PLAN 1
Pt with ERIC/ ATN in the setting of STEMI, cardiac arrest & cardiogenic shock. SCr on arrival was 2.15; trended down to hector 1.6 on 11/25; slowly trended up & increased to 3.95 11/28. Pt initiated on CRRT/CVVHDF to optimize volume and electrolytes on 12/5/22. Pt likely with ATN. Pt underwent decannulation of ECMO on 12/8/22 and was taken off CRRT.   Pt reinitiated on CRRT 12/9/22 for volume overload, that was continued until 12/19 & was initiated on iHD thereafter.    s/p RIJ tunneled HD cath on 1/11. Had Last HD session 1/16 with UF that he tolerated well. Pt remains oliguric however reports improving UO. Labs reviewed. Had maintenance HD on 1/18 that he tolerated well and additional session of HD on 1/19, for optimization prior to OR for AVF creation (done on 1/20). Clinically stable, still remains dialysis dependent. Seen on HD today tolerating well. Continue to monitor for renal recovery. Monitor labs and urine output. Avoid any potential nephrotoxins.

## 2023-01-24 NOTE — PROGRESS NOTE ADULT - SUBJECTIVE AND OBJECTIVE BOX
Subjective: " Hello"  Recd HD this am    Tele:    SR 80s                            T(C): 37.6 (01-24-23 @ 12:30), Max: 37.6 (01-24-23 @ 12:30)  HR: 86 (01-24-23 @ 12:30) (86 - 88)  BP: 103/56 (01-24-23 @ 12:30) (103/56 - 117/70)-  RR: 18 (01-24-23 @ 12:30) (18 - 18)  SpO2: 100% (01-24-23 @ 12:30) (93% - 100%)        01-24    135  |  96  |  70<H>  ----------------------------<  170<H>  3.7   |  22  |  3.22<H>    Ca    9.1      24 Jan 2023 01:47    TPro  7.8  /  Alb  3.7  /  TBili  0.6  /  DBili  x   /  AST  20  /  ALT  6<L>  /  AlkPhos  92  01-23                               8.5    11.18 )-----------( 193      ( 24 Jan 2023 01:47 )             28.1            CAPILLARY BLOOD GLUCOSE      POCT Blood Glucose.: 130 mg/dL (24 Jan 2023 11:30)  POCT Blood Glucose.: 196 mg/dL (24 Jan 2023 07:27)  POCT Blood Glucose.: 144 mg/dL (23 Jan 2023 21:23)  POCT Blood Glucose.: 151 mg/dL (23 Jan 2023 16:27)             Assessment    Neurology: alert and oriented x 3, nonfocal, no gross deficits    CV S1 S2 RRR    rt permacath cdi w/ dressing     Lungs: clear. RR easy, unlabored; former trach site cdi teresa     Abdomen: soft, nontender, nondistended, positive bowel sounds, + bowel movement       :  HD patient   Extremities:   COBB; trace LE edema, neg calf tenderness.  PPP bilaterally; rt groin vac d/c by pt this am;  RT AVF cdi w/ dressing          MEDICATIONS  (STANDING):  aspirin  chewable 81 milliGRAM(s) Oral daily  atorvastatin 80 milliGRAM(s) Oral at bedtime  baclofen 5 milliGRAM(s) Oral every 8 hours  benzonatate 100 milliGRAM(s) Oral every 8 hours  caspofungin IVPB 50 milliGRAM(s) IV Intermittent every 24 hours  chlorhexidine 2% Cloths 1 Application(s) Topical <User Schedule>  chlorhexidine 4% Liquid 1 Application(s) Topical <User Schedule>  dextrose 50% Injectable 25 Gram(s) IV Push once  dextrose 50% Injectable 12.5 Gram(s) IV Push once  dextrose 50% Injectable 25 Gram(s) IV Push once  dextrose Oral Gel 15 Gram(s) Oral once  epoetin teena-epbx (RETACRIT) Injectable 5000 Unit(s) IV Push <User Schedule>  glucagon  Injectable 1 milliGRAM(s) IntraMuscular once  hydrocortisone 2.5% Rectal Cream 1 Application(s) Rectal three times a day  hydrocortisone hemorrhoidal Suppository 1 Suppository(s) Rectal daily  insulin glargine Injectable (LANTUS) 18 Unit(s) SubCutaneous at bedtime  insulin lispro (ADMELOG) corrective regimen sliding scale   SubCutaneous three times a day before meals  insulin lispro (ADMELOG) corrective regimen sliding scale   SubCutaneous at bedtime  insulin lispro Injectable (ADMELOG) 7 Unit(s) SubCutaneous three times a day with meals  metoprolol succinate ER 50 milliGRAM(s) Oral daily  multivitamin/minerals/iron Oral Solution (CENTRUM) 15 milliLiter(s) Oral daily  pantoprazole    Tablet 40 milliGRAM(s) Oral before breakfast  polyethylene glycol 3350 17 Gram(s) Oral daily  polyethylene glycol 3350 17 Gram(s) Oral once  psyllium Powder 1 Packet(s) Oral daily  senna 2 Tablet(s) Oral at bedtime  sevelamer carbonate 800 milliGRAM(s) Oral three times a day with meals  thiamine 100 milliGRAM(s) Oral daily       PAST MEDICAL & SURGICAL HISTORY:

## 2023-01-24 NOTE — PROGRESS NOTE ADULT - PROBLEM SELECTOR PLAN 3
- small 3mm subdural hemorrhage 11/25, stable on repeat CTs, last 12/13  - okay to continue SQ heparin, appreciate neuro recs  - keep MAP < 75 mmHg

## 2023-01-24 NOTE — PROVIDER CONTACT NOTE (CHANGE IN STATUS NOTIFICATION) - ASSESSMENT
Pt alert and oriented, /56, HR 85, temp 36.4 checked.
Pt A&Ox4, VS as per flow sheet. Pt hypoxic on 4L via NC, satting 88% c/o SOB and pt placed on 6L NC with no change in 02 saturation.
Patient with right groin swelling around ECMO cannulation site. Tissue soft. RLE warm with no discoloration and (+) pedal pulse by Doppler.

## 2023-01-24 NOTE — SWALLOW VFSS/MBS ASSESSMENT ADULT - COMMENTS
Hx cont: Vascular surgery consulted for AVF planning 12/21. Wound care following for sacral/bilateral buttocks skin damage. s/p trach decannulation 1/8/23. s/p tunneled dialysis catheter placement 1/11. Endo following for High Blood Sugars/DMT2. Colorectal surgery consulted 1/18 for new onset BRBPR. 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 Bowel regimen for posterior fissure. s/p RUE brachiocephalic AV fistula 1/20. Intermittently undergoing HD. Pt pending d/c home.     IMAGING:  CXR: 1/18/23  Impression: Clear lungs.    Pt is known to this service. FEES completed 1/10/23 with rx for Regular solids/thin liquids w/ chin tuck for all liquids.

## 2023-01-24 NOTE — PROVIDER CONTACT NOTE (OTHER) - RECOMMENDATIONS
Complete Transfusion Reaction Protocol and Documentation Form . Monitor Temp. and V/S. Give Tylenol 650 mg po as ordered.
Will follow up.

## 2023-01-25 VITALS
DIASTOLIC BLOOD PRESSURE: 56 MMHG | OXYGEN SATURATION: 100 % | SYSTOLIC BLOOD PRESSURE: 98 MMHG | RESPIRATION RATE: 18 BRPM | TEMPERATURE: 98 F | HEART RATE: 93 BPM

## 2023-01-25 LAB
GLUCOSE BLDC GLUCOMTR-MCNC: 123 MG/DL — HIGH (ref 70–99)

## 2023-01-25 PROCEDURE — 87449 NOS EACH ORGANISM AG IA: CPT

## 2023-01-25 PROCEDURE — 93308 TTE F-UP OR LMTD: CPT

## 2023-01-25 PROCEDURE — 87186 SC STD MICRODIL/AGAR DIL: CPT

## 2023-01-25 PROCEDURE — C8929: CPT

## 2023-01-25 PROCEDURE — 31720 CLEARANCE OF AIRWAYS: CPT

## 2023-01-25 PROCEDURE — 83880 ASSAY OF NATRIURETIC PEPTIDE: CPT

## 2023-01-25 PROCEDURE — 94640 AIRWAY INHALATION TREATMENT: CPT

## 2023-01-25 PROCEDURE — 92611 MOTION FLUOROSCOPY/SWALLOW: CPT

## 2023-01-25 PROCEDURE — 74176 CT ABD & PELVIS W/O CONTRAST: CPT

## 2023-01-25 PROCEDURE — 83935 ASSAY OF URINE OSMOLALITY: CPT

## 2023-01-25 PROCEDURE — 95714 VEEG EA 12-26 HR UNMNTR: CPT

## 2023-01-25 PROCEDURE — 87640 STAPH A DNA AMP PROBE: CPT

## 2023-01-25 PROCEDURE — 85384 FIBRINOGEN ACTIVITY: CPT

## 2023-01-25 PROCEDURE — 85396 CLOTTING ASSAY WHOLE BLOOD: CPT

## 2023-01-25 PROCEDURE — 93005 ELECTROCARDIOGRAM TRACING: CPT

## 2023-01-25 PROCEDURE — 94660 CPAP INITIATION&MGMT: CPT

## 2023-01-25 PROCEDURE — 77001 FLUOROGUIDE FOR VEIN DEVICE: CPT

## 2023-01-25 PROCEDURE — 82728 ASSAY OF FERRITIN: CPT

## 2023-01-25 PROCEDURE — 84478 ASSAY OF TRIGLYCERIDES: CPT

## 2023-01-25 PROCEDURE — 83010 ASSAY OF HAPTOGLOBIN QUANT: CPT

## 2023-01-25 PROCEDURE — 92610 EVALUATE SWALLOWING FUNCTION: CPT

## 2023-01-25 PROCEDURE — 87340 HEPATITIS B SURFACE AG IA: CPT

## 2023-01-25 PROCEDURE — 94003 VENT MGMT INPAT SUBQ DAY: CPT

## 2023-01-25 PROCEDURE — P9045: CPT

## 2023-01-25 PROCEDURE — 82435 ASSAY OF BLOOD CHLORIDE: CPT

## 2023-01-25 PROCEDURE — 84134 ASSAY OF PREALBUMIN: CPT

## 2023-01-25 PROCEDURE — 84436 ASSAY OF TOTAL THYROXINE: CPT

## 2023-01-25 PROCEDURE — 83540 ASSAY OF IRON: CPT

## 2023-01-25 PROCEDURE — 85730 THROMBOPLASTIN TIME PARTIAL: CPT

## 2023-01-25 PROCEDURE — 81001 URINALYSIS AUTO W/SCOPE: CPT

## 2023-01-25 PROCEDURE — 84484 ASSAY OF TROPONIN QUANT: CPT

## 2023-01-25 PROCEDURE — 74018 RADEX ABDOMEN 1 VIEW: CPT

## 2023-01-25 PROCEDURE — 82962 GLUCOSE BLOOD TEST: CPT

## 2023-01-25 PROCEDURE — U0003: CPT

## 2023-01-25 PROCEDURE — P9016: CPT

## 2023-01-25 PROCEDURE — 96375 TX/PRO/DX INJ NEW DRUG ADDON: CPT

## 2023-01-25 PROCEDURE — 94799 UNLISTED PULMONARY SVC/PX: CPT

## 2023-01-25 PROCEDURE — 85014 HEMATOCRIT: CPT

## 2023-01-25 PROCEDURE — 80074 ACUTE HEPATITIS PANEL: CPT

## 2023-01-25 PROCEDURE — 70498 CT ANGIOGRAPHY NECK: CPT

## 2023-01-25 PROCEDURE — 82140 ASSAY OF AMMONIA: CPT

## 2023-01-25 PROCEDURE — 36415 COLL VENOUS BLD VENIPUNCTURE: CPT

## 2023-01-25 PROCEDURE — 84132 ASSAY OF SERUM POTASSIUM: CPT

## 2023-01-25 PROCEDURE — 71046 X-RAY EXAM CHEST 2 VIEWS: CPT

## 2023-01-25 PROCEDURE — 84145 PROCALCITONIN (PCT): CPT

## 2023-01-25 PROCEDURE — 83690 ASSAY OF LIPASE: CPT

## 2023-01-25 PROCEDURE — 87102 FUNGUS ISOLATION CULTURE: CPT

## 2023-01-25 PROCEDURE — 36558 INSERT TUNNELED CV CATH: CPT

## 2023-01-25 PROCEDURE — 87086 URINE CULTURE/COLONY COUNT: CPT

## 2023-01-25 PROCEDURE — 71250 CT THORAX DX C-: CPT

## 2023-01-25 PROCEDURE — 86923 COMPATIBILITY TEST ELECTRIC: CPT

## 2023-01-25 PROCEDURE — 85520 HEPARIN ASSAY: CPT

## 2023-01-25 PROCEDURE — 97164 PT RE-EVAL EST PLAN CARE: CPT

## 2023-01-25 PROCEDURE — 95700 EEG CONT REC W/VID EEG TECH: CPT

## 2023-01-25 PROCEDURE — C1894: CPT

## 2023-01-25 PROCEDURE — C1750: CPT

## 2023-01-25 PROCEDURE — P9047: CPT

## 2023-01-25 PROCEDURE — 93459 L HRT ART/GRFT ANGIO: CPT

## 2023-01-25 PROCEDURE — 87116 MYCOBACTERIA CULTURE: CPT

## 2023-01-25 PROCEDURE — 74177 CT ABD & PELVIS W/CONTRAST: CPT

## 2023-01-25 PROCEDURE — 87206 SMEAR FLUORESCENT/ACID STAI: CPT

## 2023-01-25 PROCEDURE — 80202 ASSAY OF VANCOMYCIN: CPT

## 2023-01-25 PROCEDURE — P9037: CPT

## 2023-01-25 PROCEDURE — 80076 HEPATIC FUNCTION PANEL: CPT

## 2023-01-25 PROCEDURE — 87070 CULTURE OTHR SPECIMN AEROBIC: CPT

## 2023-01-25 PROCEDURE — L8699: CPT

## 2023-01-25 PROCEDURE — 83615 LACTATE (LD) (LDH) ENZYME: CPT

## 2023-01-25 PROCEDURE — 86901 BLOOD TYPING SEROLOGIC RH(D): CPT

## 2023-01-25 PROCEDURE — 33967 INSERT I-AORT PERCUT DEVICE: CPT

## 2023-01-25 PROCEDURE — 86705 HEP B CORE ANTIBODY IGM: CPT

## 2023-01-25 PROCEDURE — 93355 ECHO TRANSESOPHAGEAL (TEE): CPT

## 2023-01-25 PROCEDURE — 82565 ASSAY OF CREATININE: CPT

## 2023-01-25 PROCEDURE — 86900 BLOOD TYPING SEROLOGIC ABO: CPT

## 2023-01-25 PROCEDURE — 85025 COMPLETE CBC W/AUTO DIFF WBC: CPT

## 2023-01-25 PROCEDURE — 82550 ASSAY OF CK (CPK): CPT

## 2023-01-25 PROCEDURE — 85018 HEMOGLOBIN: CPT

## 2023-01-25 PROCEDURE — 97602 WOUND(S) CARE NON-SELECTIVE: CPT

## 2023-01-25 PROCEDURE — 83550 IRON BINDING TEST: CPT

## 2023-01-25 PROCEDURE — 86922 COMPATIBILITY TEST ANTIGLOB: CPT

## 2023-01-25 PROCEDURE — 70450 CT HEAD/BRAIN W/O DYE: CPT

## 2023-01-25 PROCEDURE — 87040 BLOOD CULTURE FOR BACTERIA: CPT

## 2023-01-25 PROCEDURE — 92526 ORAL FUNCTION THERAPY: CPT

## 2023-01-25 PROCEDURE — 93321 DOPPLER ECHO F-UP/LMTD STD: CPT

## 2023-01-25 PROCEDURE — 84100 ASSAY OF PHOSPHORUS: CPT

## 2023-01-25 PROCEDURE — 74230 X-RAY XM SWLNG FUNCJ C+: CPT

## 2023-01-25 PROCEDURE — 93306 TTE W/DOPPLER COMPLETE: CPT

## 2023-01-25 PROCEDURE — 84443 ASSAY THYROID STIM HORMONE: CPT

## 2023-01-25 PROCEDURE — 82150 ASSAY OF AMYLASE: CPT

## 2023-01-25 PROCEDURE — 93567 NJX CAR CTH SPRVLV AORTGRPHY: CPT

## 2023-01-25 PROCEDURE — C1769: CPT

## 2023-01-25 PROCEDURE — 86022 PLATELET ANTIBODIES: CPT

## 2023-01-25 PROCEDURE — 76700 US EXAM ABDOM COMPLETE: CPT

## 2023-01-25 PROCEDURE — 94002 VENT MGMT INPAT INIT DAY: CPT

## 2023-01-25 PROCEDURE — 83605 ASSAY OF LACTIC ACID: CPT

## 2023-01-25 PROCEDURE — 97112 NEUROMUSCULAR REEDUCATION: CPT

## 2023-01-25 PROCEDURE — 87641 MR-STAPH DNA AMP PROBE: CPT

## 2023-01-25 PROCEDURE — 82330 ASSAY OF CALCIUM: CPT

## 2023-01-25 PROCEDURE — P9073: CPT

## 2023-01-25 PROCEDURE — U0005: CPT

## 2023-01-25 PROCEDURE — 99261: CPT

## 2023-01-25 PROCEDURE — 85610 PROTHROMBIN TIME: CPT

## 2023-01-25 PROCEDURE — 82947 ASSAY GLUCOSE BLOOD QUANT: CPT

## 2023-01-25 PROCEDURE — 82803 BLOOD GASES ANY COMBINATION: CPT

## 2023-01-25 PROCEDURE — 97530 THERAPEUTIC ACTIVITIES: CPT

## 2023-01-25 PROCEDURE — 71260 CT THORAX DX C+: CPT

## 2023-01-25 PROCEDURE — 80053 COMPREHEN METABOLIC PANEL: CPT

## 2023-01-25 PROCEDURE — 82553 CREATINE MB FRACTION: CPT

## 2023-01-25 PROCEDURE — 92612 ENDOSCOPY SWALLOW (FEES) VID: CPT

## 2023-01-25 PROCEDURE — 83735 ASSAY OF MAGNESIUM: CPT

## 2023-01-25 PROCEDURE — 96374 THER/PROPH/DIAG INJ IV PUSH: CPT

## 2023-01-25 PROCEDURE — 86706 HEP B SURFACE ANTIBODY: CPT

## 2023-01-25 PROCEDURE — 99291 CRITICAL CARE FIRST HOUR: CPT | Mod: 25

## 2023-01-25 PROCEDURE — 93970 EXTREMITY STUDY: CPT

## 2023-01-25 PROCEDURE — 86704 HEP B CORE ANTIBODY TOTAL: CPT

## 2023-01-25 PROCEDURE — 80162 ASSAY OF DIGOXIN TOTAL: CPT

## 2023-01-25 PROCEDURE — C1889: CPT

## 2023-01-25 PROCEDURE — 84439 ASSAY OF FREE THYROXINE: CPT

## 2023-01-25 PROCEDURE — 87015 SPECIMEN INFECT AGNT CONCNTJ: CPT

## 2023-01-25 PROCEDURE — 76937 US GUIDE VASCULAR ACCESS: CPT

## 2023-01-25 PROCEDURE — 84480 ASSAY TRIIODOTHYRONINE (T3): CPT

## 2023-01-25 PROCEDURE — 82272 OCCULT BLD FECES 1-3 TESTS: CPT

## 2023-01-25 PROCEDURE — 83036 HEMOGLOBIN GLYCOSYLATED A1C: CPT

## 2023-01-25 PROCEDURE — C1887: CPT

## 2023-01-25 PROCEDURE — 0225U NFCT DS DNA&RNA 21 SARSCOV2: CPT

## 2023-01-25 PROCEDURE — 80061 LIPID PANEL: CPT

## 2023-01-25 PROCEDURE — 84295 ASSAY OF SERUM SODIUM: CPT

## 2023-01-25 PROCEDURE — 87150 DNA/RNA AMPLIFIED PROBE: CPT

## 2023-01-25 PROCEDURE — 99024 POSTOP FOLLOW-UP VISIT: CPT

## 2023-01-25 PROCEDURE — 86803 HEPATITIS C AB TEST: CPT

## 2023-01-25 PROCEDURE — 85027 COMPLETE CBC AUTOMATED: CPT

## 2023-01-25 PROCEDURE — P9100: CPT

## 2023-01-25 PROCEDURE — 97110 THERAPEUTIC EXERCISES: CPT

## 2023-01-25 PROCEDURE — C8924: CPT

## 2023-01-25 PROCEDURE — 76705 ECHO EXAM OF ABDOMEN: CPT

## 2023-01-25 PROCEDURE — 97605 NEG PRS WND THER DME<=50SQCM: CPT

## 2023-01-25 PROCEDURE — 87637 SARSCOV2&INF A&B&RSV AMP PRB: CPT

## 2023-01-25 PROCEDURE — 87077 CULTURE AEROBIC IDENTIFY: CPT

## 2023-01-25 PROCEDURE — 86891 AUTOLOGOUS BLOOD OP SALVAGE: CPT

## 2023-01-25 PROCEDURE — 97116 GAIT TRAINING THERAPY: CPT

## 2023-01-25 PROCEDURE — 71045 X-RAY EXAM CHEST 1 VIEW: CPT

## 2023-01-25 PROCEDURE — 0042T: CPT

## 2023-01-25 PROCEDURE — 86850 RBC ANTIBODY SCREEN: CPT

## 2023-01-25 PROCEDURE — 70496 CT ANGIOGRAPHY HEAD: CPT

## 2023-01-25 PROCEDURE — 76000 FLUOROSCOPY <1 HR PHYS/QHP: CPT

## 2023-01-25 PROCEDURE — 87635 SARS-COV-2 COVID-19 AMP PRB: CPT

## 2023-01-25 PROCEDURE — 80048 BASIC METABOLIC PNL TOTAL CA: CPT

## 2023-01-25 PROCEDURE — 99292 CRITICAL CARE ADDL 30 MIN: CPT

## 2023-01-25 PROCEDURE — 36430 TRANSFUSION BLD/BLD COMPNT: CPT

## 2023-01-25 RX ORDER — METOPROLOL TARTRATE 50 MG
1 TABLET ORAL
Qty: 30 | Refills: 0
Start: 2023-01-25 | End: 2023-02-23

## 2023-01-25 RX ORDER — OXYCODONE AND ACETAMINOPHEN 5; 325 MG/1; MG/1
1 TABLET ORAL
Qty: 28 | Refills: 0
Start: 2023-01-25 | End: 2023-01-31

## 2023-01-25 RX ORDER — INSULIN GLARGINE 100 [IU]/ML
18 INJECTION, SOLUTION SUBCUTANEOUS
Qty: 1 | Refills: 0
Start: 2023-01-25

## 2023-01-25 RX ORDER — PSYLLIUM SEED (WITH DEXTROSE)
1 POWDER (GRAM) ORAL
Qty: 0 | Refills: 0 | DISCHARGE
Start: 2023-01-25

## 2023-01-25 RX ORDER — ROSUVASTATIN CALCIUM 5 MG/1
1 TABLET ORAL
Qty: 30 | Refills: 0
Start: 2023-01-25 | End: 2023-02-23

## 2023-01-25 RX ORDER — LINAGLIPTIN 5 MG/1
1 TABLET, FILM COATED ORAL
Qty: 30 | Refills: 0
Start: 2023-01-25 | End: 2023-02-23

## 2023-01-25 RX ORDER — ALOGLIPTIN 12.5 MG/1
1 TABLET, FILM COATED ORAL
Qty: 30 | Refills: 0
Start: 2023-01-25 | End: 2023-02-23

## 2023-01-25 RX ORDER — ATORVASTATIN CALCIUM 80 MG/1
1 TABLET, FILM COATED ORAL
Qty: 30 | Refills: 0
Start: 2023-01-25 | End: 2023-02-23

## 2023-01-25 RX ORDER — SEVELAMER CARBONATE 2400 MG/1
1 POWDER, FOR SUSPENSION ORAL
Qty: 90 | Refills: 0
Start: 2023-01-25 | End: 2023-02-23

## 2023-01-25 RX ORDER — ASPIRIN/CALCIUM CARB/MAGNESIUM 324 MG
1 TABLET ORAL
Qty: 30 | Refills: 0
Start: 2023-01-25 | End: 2023-02-23

## 2023-01-25 RX ORDER — ACETAMINOPHEN 500 MG
2 TABLET ORAL
Qty: 0 | Refills: 0 | DISCHARGE
Start: 2023-01-25

## 2023-01-25 RX ORDER — SENNA PLUS 8.6 MG/1
2 TABLET ORAL
Qty: 0 | Refills: 0 | DISCHARGE
Start: 2023-01-25

## 2023-01-25 RX ORDER — SIMETHICONE 80 MG/1
1 TABLET, CHEWABLE ORAL
Qty: 0 | Refills: 0 | DISCHARGE
Start: 2023-01-25

## 2023-01-25 RX ADMIN — Medication 100 MILLIGRAM(S): at 05:30

## 2023-01-25 RX ADMIN — CHLORHEXIDINE GLUCONATE 1 APPLICATION(S): 213 SOLUTION TOPICAL at 05:31

## 2023-01-25 RX ADMIN — Medication 5 MILLIGRAM(S): at 05:30

## 2023-01-25 RX ADMIN — SEVELAMER CARBONATE 800 MILLIGRAM(S): 2400 POWDER, FOR SUSPENSION ORAL at 08:11

## 2023-01-25 RX ADMIN — PANTOPRAZOLE SODIUM 40 MILLIGRAM(S): 20 TABLET, DELAYED RELEASE ORAL at 05:31

## 2023-01-25 RX ADMIN — Medication 1 APPLICATION(S): at 05:32

## 2023-01-25 NOTE — DISCHARGE NOTE PROVIDER - NSDCCPCAREPLAN_GEN_ALL_CORE_FT
PRINCIPAL DISCHARGE DIAGNOSIS  Diagnosis: Acute on chronic systolic congestive heart failure  Assessment and Plan of Treatment: 1. Daily Shower  2. Weight yourself daily and notify any weight gain greater than 2-3 pounds in 24 hours.  3. Regular diet - low fat, low cholesterol, no added salt.  4. Cleanse Midsternal incision and leg incision daily while showering with warm water and mild soap, pat dry and maintain open to air.   5. Follow Cardiac Surgery Do's and Don'ts discharge instructions.   6. No driving until cleared by MD.   7. No heavy lifting nothing greater than 5 pounds until cleared by MD.   8. Call / Notify MD any fever greater than 101.0  9. Increase Activity as tolerated.      SECONDARY DISCHARGE DIAGNOSES  Diagnosis: NSTEMI (non-ST elevation myocardial infarction)  Assessment and Plan of Treatment:     Diagnosis: Acute renal failure  Assessment and Plan of Treatment:     Diagnosis: Moderate dehydration  Assessment and Plan of Treatment:     Diagnosis: Acute cholecystitis  Assessment and Plan of Treatment:      PRINCIPAL DISCHARGE DIAGNOSIS  Diagnosis: Acute on chronic systolic congestive heart failure  Assessment and Plan of Treatment: 1. Daily Shower  2. Weight yourself daily and notify any weight gain greater than 2-3 pounds in 24 hours.  3. diet: low fat low cholesterol low salt diabetic diet- renal- no concentrated potasssium diet   4. Cleanse Midsternal incision and leg incision daily while showering with warm water and mild soap, pat dry and maintain open to air.   5. Follow Cardiac Surgery Do's and Don'ts discharge instructions.   6. No driving until cleared by MD.   7. No heavy lifting nothing greater than 5 pounds until cleared by MD.   8. Call / Notify MD any fever greater than 101.0  9. Increase Activity as tolerated.  10. check blood sugar levels before meals and at bedtime- record levels  11. dialysis three times a week as per nephrologist  12. right groin dressing changes daily - apply aquacell with tegrederm dressing daily  13. antibiotic prophylaxis prior to any invasive procedures including dental work   follow up with medicine clinic in 1-2 weeks; call to schedule appointment [phone]  follow up with DR. Rodrigues on Feb 6th at 11:30 am; call to confirm appointment. [phone]  follow up with CHF on Feb 6th at 9:30 am; call to confirm appointment. [phone]  follow up with endocrinologist, Dr. Quiles, in 2 weeks; Call to schedule appointment.       PRINCIPAL DISCHARGE DIAGNOSIS  Diagnosis: Acute on chronic systolic congestive heart failure  Assessment and Plan of Treatment: 1. Daily Shower  2. Weight yourself daily and notify any weight gain greater than 2-3 pounds in 24 hours.  3. diet: low fat low cholesterol low salt diabetic diet- renal- no concentrated potasssium diet   4. Cleanse Midsternal incision and leg incision daily while showering with warm water and mild soap, pat dry and maintain open to air.   5. Follow Cardiac Surgery Do's and Don'ts discharge instructions.   6. No driving until cleared by MD.   7. No heavy lifting nothing greater than 5 pounds until cleared by MD.   8. Call / Notify MD any fever greater than 101.0  9. Increase Activity as tolerated.  10. check blood sugar levels before meals and at bedtime- record levels  11. dialysis three times a week as per nephrologist  12. right groin dressing changes daily - apply aquacell with tegrederm dressing daily  13. antibiotic prophylaxis prior to any invasive procedures including dental work   follow up with medicine clinic in 1-2 weeks; call to schedule appointment [phone]  follow up with DR. Rodrigues on Feb 6th at 11:30 am; call to confirm appointment. [phone]  follow up with CHF on Feb 6th at 9:30 am; call to confirm appointment. [phone]  follow up with endocrinologist, Dr. Quiles, in 2 weeks; Call to schedule appointment.       PRINCIPAL DISCHARGE DIAGNOSIS  Diagnosis: Acute on chronic systolic congestive heart failure  Assessment and Plan of Treatment: 1. Daily Shower  2. Weight yourself daily and notify any weight gain greater than 2-3 pounds in 24 hours.  3. diet: low fat low cholesterol low salt diabetic diet- renal- no concentrated potasssium diet   4. Cleanse Midsternal incision and leg incision daily while showering with warm water and mild soap, pat dry and maintain open to air.   5. Follow Cardiac Surgery Do's and Don'ts discharge instructions.   6. No driving until cleared by MD.   7. No heavy lifting nothing greater than 5 pounds until cleared by MD.   8. Call / Notify MD any fever greater than 101.0  9. Increase Activity as tolerated.  10. check blood sugar levels before meals and at bedtime- record levels  11. dialysis three times a week as per nephrologist  12. right groin dressing changes daily - apply aquacell with tegrederm dressing daily  13. antibiotic prophylaxis prior to any invasive procedures including dental work   follow up with medicine clinic in 1-2 weeks; call to schedule appointment [phone]  follow up with DR. Rodrigues on Feb 6th at 11:30 am; call to confirm appointment. [phone]  follow up with CHF on Feb 6th at 9:30 am; call to confirm appointment. [phone]  follow up with endocrinologist, Dr. Quiles, in 2 weeks; Call to schedule appointment.

## 2023-01-25 NOTE — DISCHARGE NOTE PROVIDER - NSDCFUSCHEDAPPT_GEN_ALL_CORE_FT
[facility] Physician [facility]  HEARTFAIL [address]  Scheduled Appointment: 02/06/2023    Doc Rodrigues  [facility]  CTSURG [address]  Scheduled Appointment: 02/06/2023     [facility] Physician [facility]  HEARTFAIL [address]  Scheduled Appointment: 02/06/2023    Doc Rodrigues  [facility]  CTSURG [address]  Scheduled Appointment: 02/06/2023     [facility] Physician [facility]  HEARTFAIL [address]  Scheduled Appointment: 02/06/2023    Doc Rodrigues  [facility]  CTSURG [address]  Scheduled Appointment: 02/06/2023     [facility]  HEARTFAIL [address]  Scheduled Appointment: 02/06/2023    Doc Rodrigues  [facility]  CTSURG [address]  Scheduled Appointment: 02/06/2023    Isabella Strauss  [facility]  NEPHRO [address]  Scheduled Appointment: 02/24/2023     [facility]  HEARTFAIL [address]  Scheduled Appointment: 02/06/2023    Doc Rodrigues  [facility]  CTSURG [address]  Scheduled Appointment: 02/06/2023    Isabella Strauss  [facility]  NEPHRO [address]  Scheduled Appointment: 02/24/2023     [facility]  HEARTFAIL [address]  Scheduled Appointment: 02/06/2023    Doc Rodrigues  [facility]  CTSURG [address]  Scheduled Appointment: 02/06/2023    Isabella Strauss  [facility]  NEPHRO [address]  Scheduled Appointment: 02/24/2023

## 2023-01-25 NOTE — DISCHARGE NOTE PROVIDER - NSDCACTIVITY_GEN_ALL_CORE
Do not drive or operate machinery/Showering allowed/Do not make important decisions/Walking - Indoors allowed/No heavy lifting/straining/Walking - Outdoors allowed/Follow Instructions Provided by your Surgical Team Do not drive or operate machinery/Showering allowed/Do not make important decisions/Stairs allowed/Walking - Indoors allowed/No heavy lifting/straining/Walking - Outdoors allowed/Follow Instructions Provided by your Surgical Team

## 2023-01-25 NOTE — DISCHARGE NOTE PROVIDER - NPI NUMBER (FOR SYSADMIN USE ONLY) :
[[id number]],[UNKNOWN] [[id number]],[UNKNOWN] [[id number]],[UNKNOWN] [[id number]],[[id number]],[[id number]],[[id number]] [[id number]],[[id number]],[[id number]],[[id number]] [[id number]],[[id number]],[[id number]],[[id number]]

## 2023-01-25 NOTE — DISCHARGE NOTE PROVIDER - CARE PROVIDER_API CALL
Doc Rodrigues)  Surgery; Thoracic and Cardiac Surgery  [address] [place] [address]  Phone: [phone]  Fax: [phone]  Scheduled Appointment: 02/06/2023 11:30 AM    Heart, Failure  [address]  Phone: (   )    -  Fax: (   )    -  Scheduled Appointment: 02/06/2023 09:30 AM   Doc Rodrigues)  Surgery; Thoracic and Cardiac Surgery  [address] [place] [address]  Phone: [phone]  Fax: [phone]  Scheduled Appointment: 02/06/2023 11:30 AM    Heart, Failure  [address]  Phone: (   )    -  Fax: (   )    -  Scheduled Appointment: 02/06/2023 09:30 AM   Doc Rodrigues)  Surgery; Thoracic and Cardiac Surgery  [address] [place] [address]  Phone: [phone]  Fax: [phone]  Scheduled Appointment: 02/06/2023 11:30 AM    Heart, Failure  [address]  Phone: (   )    -  Fax: (   )    -  Scheduled Appointment: 02/06/2023 09:30 AM   Doc Rodrigues)  Surgery; Thoracic and Cardiac Surgery  [address] [place] [address]  Phone: [phone]  Fax: [phone]  Scheduled Appointment: 02/06/2023 11:30 AM    Adalberto Bills)  Internal Medicine  [address] [address]  Phone: [phone]  Fax: [phone]  Follow Up Time:     Nayeli Quiles)  EndocrinologyMetabDiabetes; Internal Medicine  [address] [place] [address]  Phone: [phone]  Fax: [phone]  Follow Up Time:     Cricket West)  Surgery; Vascular Surgery  [address], [place] [address]  Phone: [phone]  Fax: [phone]  Follow Up Time:    Doc Rodrigues)  Surgery; Thoracic and Cardiac Surgery  [address] [place] [address]  Phone: [phone]  Fax: [phone]  Scheduled Appointment: 02/06/2023 11:30 AM    Adalberto Bills)  Internal Medicine  [address] [address]  Phone: [phone]  Fax: [phone]  Follow Up Time:     Nayeli Quiles)  EndocrinologyMetabDiabetes; Internal Medicine  [address] [place] [address]  Phone: [phone]  Fax: [phone]  Follow Up Time:     Cricket West)  Surgery; Vascular Surgery  [address] [facility], [place] [address]  Phone: [phone]  Fax: [phone]  Follow Up Time:    Doc Rodrigues)  Surgery; Thoracic and Cardiac Surgery  [address] [place] [address]  Phone: [phone]  Fax: [phone]  Scheduled Appointment: 02/06/2023 11:30 AM    Adalberto Bills)  Internal Medicine  [address] [address]  Phone: [phone]  Fax: [phone]  Follow Up Time:     Nayeli Quiles)  EndocrinologyMetabDiabetes; Internal Medicine  [address] [place] [address]  Phone: [phone]  Fax: [phone]  Follow Up Time:     Cricket West)  Surgery; Vascular Surgery  [address], [place] [address]  Phone: [phone]  Fax: [phone]  Follow Up Time:

## 2023-01-25 NOTE — DISCHARGE NOTE PROVIDER - NSDCMRMEDTOKEN_GEN_ALL_CORE_FT
aspirin 81 mg oral tablet: 1 tab(s) orally once a day  carvedilol 6.25 mg oral tablet: 1 tab(s) orally 2 times a day  HumuLIN 70/30 subcutaneous suspension: 18 unit(s) subcutaneous once a day  HumuLIN 70/30 subcutaneous suspension: 16 unit(s) subcutaneous once a day (at bedtime)  indapamide: 1.5 milligram(s) orally once a day  metFORMIN 1000 mg oral tablet, extended release: 1 tab(s) orally once a day  omeprazole 20 mg oral delayed release capsule: 1 cap(s) orally once a day  ramipril 2.5 mg oral tablet: 1 tab(s) orally once a day  rivaroxaban 2.5 mg oral tablet: 1 tab(s) orally once a day  rosuvastatin 10 mg oral tablet: 1 tab(s) orally once a day   acetaminophen 325 mg oral tablet: 2 tab(s) orally every 6 hours, As needed, Temp greater or equal to 38C (100.4F), Mild Pain (1 - 3)  aspirin 81 mg oral tablet, chewable: 1 tab(s) orally once a day  Insulin Pen Needles, 4mm: 1 application subcutaneously 4 times a day. ** Use with insulin pen **   Lantus Solostar Pen 100 units/mL subcutaneous solution: subcutaneous  metoprolol succinate 50 mg oral tablet, extended release: 1 tab(s) orally once a day  omeprazole 20 mg oral delayed release capsule: 1 cap(s) orally once a day  oxycodone-acetaminophen 5 mg-325 mg oral tablet: 1 tab(s) orally every 6 hours, As Needed -Mild Pain (1 - 3) MDD:4  psyllium 3.4 g/7 g oral powder for reconstitution: 1 packet(s) orally once a day  rosuvastatin 10 mg oral tablet: 1 tab(s) orally once a day  senna leaf extract oral tablet: 2 tab(s) orally once a day (at bedtime)  sevelamer carbonate 800 mg oral tablet: 1 tab(s) orally 3 times a day (with meals)  simethicone 80 mg oral tablet, chewable: 1 tab(s) orally once a day, As needed, Gas  Tradjenta 5 mg oral tablet: 1 tab(s) orally once a day    acetaminophen 325 mg oral tablet: 2 tab(s) orally every 6 hours, As needed, Temp greater or equal to 38C (100.4F), Mild Pain (1 - 3)  alogliptin 12.5 mg oral tablet: 1 tab(s) orally once a day   aspirin 81 mg oral tablet, chewable: 1 tab(s) orally once a day  Basaglar KwikPen 100 units/mL subcutaneous solution: 18 unit(s) subcutaneous once a day (at bedtime)  Insulin Pen Needles, 4mm: 1 application subcutaneously 4 times a day. ** Use with insulin pen **   metoprolol succinate 50 mg oral tablet, extended release: 1 tab(s) orally once a day  omeprazole 20 mg oral delayed release capsule: 1 cap(s) orally once a day  oxycodone-acetaminophen 5 mg-325 mg oral tablet: 1 tab(s) orally every 6 hours, As Needed -Mild Pain (1 - 3) MDD:4  psyllium 3.4 g/7 g oral powder for reconstitution: 1 packet(s) orally once a day  rosuvastatin 10 mg oral tablet: 1 tab(s) orally once a day  senna leaf extract oral tablet: 2 tab(s) orally once a day (at bedtime)  sevelamer carbonate 800 mg oral tablet: 1 tab(s) orally 3 times a day (with meals)  simethicone 80 mg oral tablet, chewable: 1 tab(s) orally once a day, As needed, Gas   acetaminophen 325 mg oral tablet: 2 tab(s) orally every 6 hours, As needed, Temp greater or equal to 38C (100.4F), Mild Pain (1 - 3)  alogliptin 12.5 mg oral tablet: 1 tab(s) orally once a day   aspirin 81 mg oral tablet, chewable: 1 tab(s) orally once a day  Basaglar KwikPen 100 units/mL subcutaneous solution: 18 unit(s) subcutaneous once a day (at bedtime)  Insulin Pen Needles, 4mm: 1 application subcutaneously 4 times a day. ** Use with insulin pen **   Lipitor 80 mg oral tablet: 1 tab(s) orally once a day   metoprolol succinate 50 mg oral tablet, extended release: 1 tab(s) orally once a day  omeprazole 20 mg oral delayed release capsule: 1 cap(s) orally once a day  oxycodone-acetaminophen 5 mg-325 mg oral tablet: 1 tab(s) orally every 6 hours, As Needed -Mild Pain (1 - 3) MDD:4  psyllium 3.4 g/7 g oral powder for reconstitution: 1 packet(s) orally once a day  senna leaf extract oral tablet: 2 tab(s) orally once a day (at bedtime)  sevelamer carbonate 800 mg oral tablet: 1 tab(s) orally 3 times a day (with meals)  simethicone 80 mg oral tablet, chewable: 1 tab(s) orally once a day, As needed, Gas

## 2023-01-25 NOTE — DISCHARGE NOTE PROVIDER - DISCHARGE DIET
Consistent Carbohydrate Diabetic Diets/Renal Diets (for dialysis) DASH Diet/Consistent Carbohydrate Diabetic Diets/Renal Diets (for dialysis)

## 2023-01-25 NOTE — DISCHARGE NOTE PROVIDER - CARE PROVIDERS DIRECT ADDRESSES
,[email or web address],DirectAddress_Unknown ,[email or web address],DirectAddress_Unknown ,[email or web address],DirectAddress_Unknown ,[email or web address],DirectAddress_Unknown,[email or web address] ,[email or web address],DirectAddress_Unknown,[email or web address] ,[email or web address],DirectAddress_Unknown,[email or web address]

## 2023-01-25 NOTE — DISCHARGE NOTE PROVIDER - NSDCPNSUBOBJ_GEN_ALL_CORE
VITAL SIGNS    Telemetry: SR 90s  Overnight Events: no acute events    Vital Signs Last 24 Hrs  T(C): 37.3 (23 @ 18:41), Max: 37.6 (23 @ 12:30)  T(F): 99.1 (23 @ 18:41), Max: 99.7 (23 @ 12:30)  HR: 94 (23 @ 18:41) (86 - 99)  BP: 94/63 (23 @ 18:41) (94/63 - 125/79)  RR: 18 (23 @ 18:41) (18 - 18)  SpO2: 96% (23 @ 18:41) (93% - 100%)             @ 07:01  -   @ 07:00  --------------------------------------------------------  IN: 690 mL / OUT: 1050 mL / NET: -360 mL     @ 07:01  -   @ 00:45  --------------------------------------------------------  IN: 900 mL / OUT: 1800 mL / NET: -900 mL       Daily     Daily Weight in k.9 (2023 12:30)  Admit Wt: Drug Dosing Weight  Height (cm): 172.7 (2023 17:23)  Weight (kg): 81.3 (2023 17:23)  BMI (kg/m2): 27.3 (2023 17:23)  BSA (m2): 1.95 (2023 17:23)      CAPILLARY BLOOD GLUCOSE      POCT Blood Glucose.: 153 mg/dL (2023 21:04)  POCT Blood Glucose.: 79 mg/dL (2023 16:19)  POCT Blood Glucose.: 130 mg/dL (2023 11:30)  POCT Blood Glucose.: 196 mg/dL (2023 07:27)          acetaminophen     Tablet .. 650 milliGRAM(s) Oral every 6 hours PRN  aspirin  chewable 81 milliGRAM(s) Oral daily  atorvastatin 80 milliGRAM(s) Oral at bedtime  baclofen 5 milliGRAM(s) Oral every 8 hours  benzonatate 100 milliGRAM(s) Oral every 8 hours  caspofungin IVPB 50 milliGRAM(s) IV Intermittent every 24 hours  chlorhexidine 2% Cloths 1 Application(s) Topical <User Schedule>  chlorhexidine 4% Liquid 1 Application(s) Topical <User Schedule>  dextrose 50% Injectable 25 Gram(s) IV Push once  dextrose 50% Injectable 12.5 Gram(s) IV Push once  dextrose 50% Injectable 25 Gram(s) IV Push once  dextrose Oral Gel 15 Gram(s) Oral once  epoetin teena-epbx (RETACRIT) Injectable 5000 Unit(s) IV Push <User Schedule>  glucagon  Injectable 1 milliGRAM(s) IntraMuscular once  guaiFENesin Oral Liquid (Sugar-Free) 200 milliGRAM(s) Oral every 6 hours PRN  hydrocortisone 2.5% Rectal Cream 1 Application(s) Rectal three times a day  hydrocortisone hemorrhoidal Suppository 1 Suppository(s) Rectal daily  insulin glargine Injectable (LANTUS) 18 Unit(s) SubCutaneous at bedtime  insulin lispro (ADMELOG) corrective regimen sliding scale   SubCutaneous three times a day before meals  insulin lispro (ADMELOG) corrective regimen sliding scale   SubCutaneous at bedtime  insulin lispro Injectable (ADMELOG) 7 Unit(s) SubCutaneous three times a day with meals  metoprolol succinate ER 50 milliGRAM(s) Oral daily  multivitamin/minerals/iron Oral Solution (CENTRUM) 15 milliLiter(s) Oral daily  oxycodone    5 mG/acetaminophen 325 mG 1 Tablet(s) Oral every 4 hours PRN  pantoprazole    Tablet 40 milliGRAM(s) Oral before breakfast  polyethylene glycol 3350 17 Gram(s) Oral daily  polyethylene glycol 3350 17 Gram(s) Oral once  psyllium Powder 1 Packet(s) Oral daily  senna 2 Tablet(s) Oral at bedtime  sevelamer carbonate 800 milliGRAM(s) Oral three times a day with meals  simethicone 80 milliGRAM(s) Chew daily PRN  thiamine 100 milliGRAM(s) Oral daily                            8.5    11.18 )-----------( 193      ( 2023 01:47 )             28.1         135  |  96  |  70<H>  ----------------------------<  170<H>  3.7   |  22  |  3.22<H>    Ca    9.1      2023 01:47    TPro  7.8  /  Alb  3.7  /  TBili  0.6  /  DBili  x   /  AST  20  /  ALT  6<L>  /  AlkPhos  92          PHYSICAL EXAM    Subjective:    Neurology: alert and oriented x 3, nonfocal, no gross deficits  CV S1 S2 RRR  rt permacath cdi w/ dressing   Lungs: clear. RR easy, unlabored; former trach site cdi teresa   Abdomen: soft, nontender, nondistended, positive bowel sounds, + bowel movement     :  HD patient   Extremities:   COBB; trace LE edema, neg calf tenderness.  PPP bilaterally; rt groin vac d/c by pt this am;  RT AVF cdi w/ dressing        Coumadin    [ ] YES          [x]      NO         Eliquis    [ ] YES          [x]      NO       Heparin gtt   [  ] YES              [x]   NO  Dose:    Disposition:  Home [  x ] with family     Rehab [   ]       OR Date:    Plan of care discussed with Cardiothoracic Team at AM rounds.    Pt is stable and cleared for discharged as per Dr. Rodrigues.    I have personally spent 45 min face to face encounter with patient and discharge note.                             VITAL SIGNS    Telemetry: SR 90s  Overnight Events: no acute events    Vital Signs Last 24 Hrs  T(C): 37.3 (23 @ 18:41), Max: 37.6 (23 @ 12:30)  T(F): 99.1 (23 @ 18:41), Max: 99.7 (23 @ 12:30)  HR: 94 (23 @ 18:41) (86 - 99)  BP: 94/63 (23 @ 18:41) (94/63 - 125/79)  RR: 18 (23 @ 18:41) (18 - 18)  SpO2: 96% (23 @ 18:41) (93% - 100%)             @ 07:01  -   @ 07:00  --------------------------------------------------------  IN: 690 mL / OUT: 1050 mL / NET: -360 mL     @ 07:01  -   @ 00:45  --------------------------------------------------------  IN: 900 mL / OUT: 1800 mL / NET: -900 mL       Daily     Daily Weight in k.9 (2023 12:30)  Admit Wt: Drug Dosing Weight  Height (cm): 172.7 (2023 17:23)  Weight (kg): 81.3 (2023 17:23)  BMI (kg/m2): 27.3 (2023 17:23)  BSA (m2): 1.95 (2023 17:23)      CAPILLARY BLOOD GLUCOSE      POCT Blood Glucose.: 153 mg/dL (2023 21:04)  POCT Blood Glucose.: 79 mg/dL (2023 16:19)  POCT Blood Glucose.: 130 mg/dL (2023 11:30)  POCT Blood Glucose.: 196 mg/dL (2023 07:27)          acetaminophen     Tablet .. 650 milliGRAM(s) Oral every 6 hours PRN  aspirin  chewable 81 milliGRAM(s) Oral daily  atorvastatin 80 milliGRAM(s) Oral at bedtime  baclofen 5 milliGRAM(s) Oral every 8 hours  benzonatate 100 milliGRAM(s) Oral every 8 hours  caspofungin IVPB 50 milliGRAM(s) IV Intermittent every 24 hours  chlorhexidine 2% Cloths 1 Application(s) Topical <User Schedule>  chlorhexidine 4% Liquid 1 Application(s) Topical <User Schedule>  dextrose 50% Injectable 25 Gram(s) IV Push once  dextrose 50% Injectable 12.5 Gram(s) IV Push once  dextrose 50% Injectable 25 Gram(s) IV Push once  dextrose Oral Gel 15 Gram(s) Oral once  epoetin teena-epbx (RETACRIT) Injectable 5000 Unit(s) IV Push <User Schedule>  glucagon  Injectable 1 milliGRAM(s) IntraMuscular once  guaiFENesin Oral Liquid (Sugar-Free) 200 milliGRAM(s) Oral every 6 hours PRN  hydrocortisone 2.5% Rectal Cream 1 Application(s) Rectal three times a day  hydrocortisone hemorrhoidal Suppository 1 Suppository(s) Rectal daily  insulin glargine Injectable (LANTUS) 18 Unit(s) SubCutaneous at bedtime  insulin lispro (ADMELOG) corrective regimen sliding scale   SubCutaneous three times a day before meals  insulin lispro (ADMELOG) corrective regimen sliding scale   SubCutaneous at bedtime  insulin lispro Injectable (ADMELOG) 7 Unit(s) SubCutaneous three times a day with meals  metoprolol succinate ER 50 milliGRAM(s) Oral daily  multivitamin/minerals/iron Oral Solution (CENTRUM) 15 milliLiter(s) Oral daily  oxycodone    5 mG/acetaminophen 325 mG 1 Tablet(s) Oral every 4 hours PRN  pantoprazole    Tablet 40 milliGRAM(s) Oral before breakfast  polyethylene glycol 3350 17 Gram(s) Oral daily  polyethylene glycol 3350 17 Gram(s) Oral once  psyllium Powder 1 Packet(s) Oral daily  senna 2 Tablet(s) Oral at bedtime  sevelamer carbonate 800 milliGRAM(s) Oral three times a day with meals  simethicone 80 milliGRAM(s) Chew daily PRN  thiamine 100 milliGRAM(s) Oral daily                            8.5    11.18 )-----------( 193      ( 2023 01:47 )             28.1         135  |  96  |  70<H>  ----------------------------<  170<H>  3.7   |  22  |  3.22<H>    Ca    9.1      2023 01:47    TPro  7.8  /  Alb  3.7  /  TBili  0.6  /  DBili  x   /  AST  20  /  ALT  6<L>  /  AlkPhos  92          PHYSICAL EXAM    Subjective:    Neurology: alert and oriented x 3, nonfocal, no gross deficits  CV S1 S2 RRR  rt permacath cdi w/ dressing   Lungs: clear. RR easy, unlabored; former trach site cdi teresa   Abdomen: soft, nontender, nondistended, positive bowel sounds, + bowel movement     :  HD patient   Extremities:   COBB; trace LE edema, neg calf tenderness.  PPP bilaterally; rt groin vac d/c   RT AVF cdi w/ dressing        Coumadin    [ ] YES          [x]      NO         Eliquis    [ ] YES          [x]      NO       Heparin gtt   [  ] YES              [x]   NO  Dose:    Disposition:  Home [  x ] with family     Rehab [   ]       OR Date:    Plan of care discussed with Cardiothoracic Team at AM rounds.    Pt is stable and cleared for discharged as per Dr. Rodrigues.    I have personally spent 45 min face to face encounter with patient and discharge note.    Discharge pt home today  as per DR. Rodrigues

## 2023-01-25 NOTE — DISCHARGE NOTE PROVIDER - HOSPITAL COURSE
11/24 Transfer from [facility] to SICU c/f STEMI, abd US +GB stones, pericholecystic fluid  11/25 [facility] s/p hypoxic respiratory arrest/PEA arrest, CTH 4mm subdural, ERIC  12/5 CVVHD, RUQ US neg   12/6 IABP placed in cath lab, LIMA to LAD patent, RCA and LCx down, CTH subdural decreased in size, persistent pancreatic inflammation   12/7 TTE turndown, EF 30-40%, nml RV, MR mod, mod TR  12/8 ECMO decannulation, aflutter DCCV x 2  12/12 Mental status change- CTH no change, CT perfusion/CTA H/N neg for LVO, +low grade watershed infarct to L MCA  12/16 Mitral clip x 1, TRACH 7 Shiley XLT w/ ENT  12/17 IABP dced  12/26 +clinton BCx   12/27 R groin vac  1/8 trach decannulation   1/10 passed FEEST  1/11 R permacath placed by IR  1/13 Transferred to SDU  1/14 VSS; RSR ; hd as per renal MWF; continue vac to rt groin ; nutritional optimization and PT; [facility] sx called for AV fistula placement on this admission (vein mapping completed 12/22) ; continue capsofungin as per Dr. Horton; endo consulted today for dm management- placed on lantus and admelog  1/15 VSS; RSR 70-80; HD as per renal - k 3.8 today; continue nutritional support and pt; prealbumin 21; await date of AVF from [facility] sx--> pt cleared as per cardiac surgeon Dr. Horton- pt must have AVF with cardiac anesthesia-discussed with [facility] sx fellow Rajeev Ascencio; continue vac to rt groin; bs improved on lantus/ admelog   discharge planning- acute rehab when stable   1/16 HD, rt groin vac --> labs stable  1/17 Increase ambulation.   AVF this week, will need cardiac anesthesia   May transfer to floor  Cont capsofungin  1/18 AVF this week with cardiac anesthesia/vascular, cleared by Dr horton.  Remains on capsofungin prophylactically, completed coursse  + cough, HD today , cxr done, clear  Colorectal vizcaino for rectal pain, rectal fissure-- hydrocortisone  1/19 AVF tomorrow.  Additional HD session today and likely Saturday  The patient would like to go home instead of rehab, choosing [facility] dialysis in [place]   1/20 AVF today. Add metamucil rectal fissure pain. Additional HD yesterday, next HD Saturday.  Transfer to floor.  1/21 VVS; Plan for HD today.  Proamatine weaned down to 10 mg PO TID.  Plan to wean down to 5 mg PO TID in AM then D/C on 1/23. CXR PA/LA ordered.    1/22 VSS decrease Proamatine to  5tid today   1/23 VSS: RSR 60-80 proamatine d/c this am; HD as per renal TTS; rt groin vac d/c today as per PT; vac no longer necessary; aquacell with tegraderm to rt groin daily; d/w endo home dm recs  1/24  HD this am>recd PRBC x 1 during HD over 1 hour  RN reports 30 mins after transfusion + rigors  >transfusion reaction protocol followed Blood bag returned top blood bank  Type / cross,  blood specimen  collected  Tylenol given  Will observe  Discharge planning- home when outpatient HD placement confirmed; f/u CHF as an outpatient on 2/9 at 9:30; change lopressor to toprol 50 qd starting tue 1/24 as per CHF  as per endo- discharge pt home on lantus 16 qhs and trajenta 5 mg po qd   no further follow-up required as per GI for gallstone pancreatitis   1/25 PT stable and cleared for discharge as per Dr. Horton. 11/24 Transfer from [facility] to SICU c/f STEMI, abd US +GB stones, pericholecystic fluid  11/25 [facility] ECMO s/p hypoxic respiratory arrest/PEA arrest, CTH 4mm subdural, ERIC  12/5 CVVHD, RUQ US neg   12/6 IABP placed in cath lab, LIMA to LAD patent, RCA and LCx down, CTH subdural decreased in size, persistent pancreatic inflammation   12/7 TTE turndown, EF 30-40%, nml RV, MR mod, mod TR  12/8 ECMO decannulation, aflutter DCCV x 2  12/12 Mental status change- CTH no change, CT perfusion/CTA H/N neg for LVO, +low grade watershed infarct to L MCA  12/16 Mitral clip x 1, TRACH 7 Shiley XLT w/ ENT  12/17 IABP dced  12/26 +clinton BCx   12/27 R groin vac  1/8 trach decannulation   1/10 passed FEEST  1/11 R permacath placed by IR  1/13 Transferred to SDU  1/14 VSS; RSR ; hd as per renal MWF; continue vac to rt groin ; nutritional optimization and PT; [facility] sx called for AV fistula placement on this admission (vein mapping completed 12/22) ; continue capsofungin as per Dr. Horton; endo consulted today for dm management- placed on lantus and admelog  1/15 VSS; RSR 70-80; HD as per renal - k 3.8 today; continue nutritional support and pt; prealbumin 21; await date of AVF from [facility] sx--> pt cleared as per cardiac surgeon Dr. Horton- pt must have AVF with cardiac anesthesia-discussed with [facility] sx fellow Rajeev Ascencio; continue vac to rt groin; bs improved on lantus/ admelog   discharge planning- acute rehab when stable   1/16 HD, rt groin vac --> labs stable  1/17 Increase ambulation.   AVF this week, will need cardiac anesthesia   May transfer to floor  Cont capsofungin  1/18 AVF this week with cardiac anesthesia/vascular, cleared by Dr horton.  Remains on capsofungin prophylactically, completed coursse  + cough, HD today , cxr done, clear  Colorectal vizcaino for rectal pain, rectal fissure-- hydrocortisone  1/19 AVF tomorrow.  Additional HD session today and likely Saturday  The patient would like to go home instead of rehab, choosing [facility] dialysis in [place]   1/20 AVF today. Add metamucil rectal fissure pain. Additional HD yesterday, next HD Saturday.  Transfer to floor.  1/21 VVS; Plan for HD today.  Proamatine weaned down to 10 mg PO TID.  Plan to wean down to 5 mg PO TID in AM then D/C on 1/23. CXR PA/LA ordered.    1/22 VSS decrease Proamatine to  5tid today   1/23 VSS: RSR 60-80 proamatine d/c this am; HD as per renal TTS; rt groin vac d/c today as per PT; vac no longer necessary; aquacell with tegraderm to rt groin daily; d/w endo home dm recs  1/24  HD this am>recd PRBC x 1 during HD over 1 hour  RN reports 30 mins after transfusion + rigors  >transfusion reaction protocol followed Blood bag returned top blood bank  Type / cross,  blood specimen  collected  Tylenol given  Will observe  Discharge planning- home when outpatient HD placement confirmed; f/u CHF as an outpatient on 2/9 at 9:30; change lopressor to toprol 50 qd starting tue 1/24 as per CHF  as per endo- discharge pt home on lantus 16 qhs and trajenta 5 mg po qd   no further follow-up required as per GI for gallstone pancreatitis   1/25 PT stable and cleared for discharge as per Dr. Horton. 11/24 Transfer from [facility] to SICU c/f STEMI, abd US +GB stones, pericholecystic fluid  11/25 [facility] s/p hypoxic respiratory arrest/PEA arrest, CTH 4mm subdural, ERIC  12/5 CVVHD, RUQ US neg   12/6 IABP placed in cath lab, LIMA to LAD patent, RCA and LCx down, CTH subdural decreased in size, persistent pancreatic inflammation   12/7 TTE turndown, EF 30-40%, nml RV, MR mod, mod TR  12/8 ECMO decannulation, aflutter DCCV x 2  12/12 Mental status change- CTH no change, CT perfusion/CTA H/N neg for LVO, +low grade watershed infarct to L MCA  12/16 Mitral clip x 1, TRACH 7 Shiley XLT w/ ENT  12/17 IABP dced  12/26 +clinton BCx   12/27 R groin vac  1/8 trach decannulation   1/10 passed FEEST  1/11 R permacath placed by IR  1/13 Transferred to SDU  1/14 VSS; RSR ; hd as per renal MWF; continue vac to rt groin ; nutritional optimization and PT; [facility] sx called for AV fistula placement on this admission (vein mapping completed 12/22) ; continue capsofungin as per Dr. Horton; endo consulted today for dm management- placed on lantus and admelog  1/15 VSS; RSR 70-80; HD as per renal - k 3.8 today; continue nutritional support and pt; prealbumin 21; await date of AVF from [facility] sx--> pt cleared as per cardiac surgeon Dr. Horton- pt must have AVF with cardiac anesthesia-discussed with [facility] sx fellow Rajeev Ascencio; continue vac to rt groin; bs improved on lantus/ admelog   discharge planning- acute rehab when stable   1/16 HD, rt groin vac --> labs stable  1/17 Increase ambulation.   AVF this week, will need cardiac anesthesia   May transfer to floor  Cont capsofungin  1/18 AVF this week with cardiac anesthesia/vascular, cleared by Dr horton.  Remains on capsofungin prophylactically, completed coursse  + cough, HD today , cxr done, clear  Colorectal vizcaino for rectal pain, rectal fissure-- hydrocortisone  1/19 AVF tomorrow.  Additional HD session today and likely Saturday  The patient would like to go home instead of rehab, choosing [facility] dialysis in [place]   1/20 AVF today. Add metamucil rectal fissure pain. Additional HD yesterday, next HD Saturday.  Transfer to floor.  1/21 VVS; Plan for HD today.  Proamatine weaned down to 10 mg PO TID.  Plan to wean down to 5 mg PO TID in AM then D/C on 1/23. CXR PA/LA ordered.    1/22 VSS decrease Proamatine to  5tid today   1/23 VSS: RSR 60-80 proamatine d/c this am; HD as per renal TTS; rt groin vac d/c today as per PT; vac no longer necessary; aquacell with tegraderm to rt groin daily; d/w endo home dm recs  1/24  HD this am>recd PRBC x 1 during HD over 1 hour  RN reports 30 mins after transfusion + rigors  >transfusion reaction protocol followed Blood bag returned top blood bank  Type / cross,  blood specimen  collected  Tylenol given  Will observe  Discharge planning- home when outpatient HD placement confirmed; f/u CHF as an outpatient on 2/9 at 9:30; change lopressor to toprol 50 qd starting tue 1/24 as per CHF  as per endo- discharge pt home on lantus 16 qhs and trajenta 5 mg po qd   no further follow-up required as per GI for gallstone pancreatitis   1/25 PT stable and cleared for discharge as per Dr. Horton.

## 2023-01-25 NOTE — DISCHARGE NOTE PROVIDER - NSDCHHNEEDSERVICE_GEN_ALL_CORE
Wound care and assessment Medication teaching and assessment/Observation and assessment/Teaching and training/Wound care and assessment

## 2023-01-25 NOTE — DISCHARGE NOTE PROVIDER - PROVIDER TOKENS
PROVIDER:[TOKEN:[[id number]],SCHEDULEDAPPT:[02/06/2023],SCHEDULEDAPPTTIME:[11:30 AM]],FREE:[LAST:[Heart],FIRST:[Failure],PHONE:[(   )    -],FAX:[(   )    -],ADDRESS:[[address] [place] [address]],SCHEDULEDAPPT:[02/06/2023],SCHEDULEDAPPTTIME:[09:30 AM]] PROVIDER:[TOKEN:[[id number]],SCHEDULEDAPPT:[02/06/2023],SCHEDULEDAPPTTIME:[11:30 AM]],FREE:[LAST:[Heart],FIRST:[Failure],PHONE:[(   )    -],FAX:[(   )    -],ADDRESS:[[address] [place] [address]],SCHEDULEDAPPT:[02/06/2023],SCHEDULEDAPPTTIME:[09:30 AM]] PROVIDER:[TOKEN:[[id number]],SCHEDULEDAPPT:[02/06/2023],SCHEDULEDAPPTTIME:[11:30 AM]],FREE:[LAST:[Heart],FIRST:[Failure],PHONE:[(   )    -],FAX:[(   )    -],ADDRESS:[[address] [place] [address]],SCHEDULEDAPPT:[02/06/2023],SCHEDULEDAPPTTIME:[09:30 AM]] PROVIDER:[TOKEN:[[id number]],SCHEDULEDAPPT:[02/06/2023],SCHEDULEDAPPTTIME:[11:30 AM]],PROVIDER:[TOKEN:[[id number]]],PROVIDER:[TOKEN:[[id number]]],PROVIDER:[TOKEN:[[id number]]] PROVIDER:[TOKEN:[[id number]],SCHEDULEDAPPT:[02/06/2023],SCHEDULEDAPPTTIME:[11:30 AM]],PROVIDER:[TOKEN:[[id number]]],PROVIDER:[TOKEN:[[id number]]],PROVIDER:[TOKEN:[[id number]]] PROVIDER:[TOKEN:[[id number]],SCHEDULEDAPPT:[02/06/2023],SCHEDULEDAPPTTIME:[11:30 AM]],PROVIDER:[TOKEN:[[id number]]],PROVIDER:[TOKEN:[[id number]]],PROVIDER:[TOKEN:[[id number]]]

## 2023-01-25 NOTE — DISCHARGE NOTE PROVIDER - NSDCFUADDINST_GEN_ALL_CORE_FT
1. Daily Shower  2. Weight yourself daily and notify any weight gain greater than 2-3 pounds in 24 hours.  3. Regular diet - low fat, low cholesterol, no added salt.  4. Cleanse Midsternal incision and leg incision daily while showering with warm water and mild soap, pat dry and maintain open to air.   5. Follow Cardiac Surgery Do's and Don'ts discharge instructions.   6. No driving until cleared by MD.   7. No heavy lifting nothing greater than 5 pounds until cleared by MD.   8. Call / Notify MD any fever greater than 101.0  9. Increase Activity as tolerated.  1. Daily Shower  2. Weight yourself daily and notify any weight gain greater than 2-3 pounds in 24 hours.  3. Regular diet - low fat, low cholesterol, no added salt.  4. Cleanse Midsternal incision and leg incision daily while showering with warm water and mild soap, pat dry and maintain open to air.   5. Follow Cardiac Surgery Do's and Don'ts discharge instructions.   6. No driving until cleared by MD.   7. No heavy lifting nothing greater than 5 pounds until cleared by MD.   8. Call / Notify MD any fever greater than 101.0  9. Increase Activity as tolerated.   10. dialysis three times a week as per nephrologist  11. check blood sugar levels before meals and at bedtime- record levels  12. wound care to right groin daily; aquacell with tegrederm to right groin daily

## 2023-01-25 NOTE — DISCHARGE NOTE PROVIDER - NSDCCAREPROVSEEN_GEN_ALL_CORE_FT
Sherif Huang is a 65 year old male presenting for follow apt. Pt last seen in clinic 11/15/17 for right inguinal hernia repair on 9/28/17. Pt reports positional pain noted in right testical with lifting/bending motions. Pt reports he still has noted bulge to right groin. Pt reports symptoms have remained the same since last seen in clinic.    Denies known Latex allergy or symptoms of Latex sensitivity.  Medications reviewed and updated.  Tobacco history reviewed.        Doc Rodrigues oDc Rodrigues

## 2023-01-26 ENCOUNTER — TRANSCRIPTION ENCOUNTER (OUTPATIENT)
Age: 63
End: 2023-01-26

## 2023-01-26 LAB
CULTURE RESULTS: SIGNIFICANT CHANGE UP
SPECIMEN SOURCE: SIGNIFICANT CHANGE UP

## 2023-02-01 PROBLEM — Z09 POSTOPERATIVE FOLLOW-UP: Status: ACTIVE | Noted: 2023-02-01

## 2023-02-01 PROBLEM — Z98.890 STATUS POST IMPLANTATION OF MITRAL VALVE LEAFLET CLIP: Status: ACTIVE | Noted: 2023-02-01

## 2023-02-01 RX ORDER — ACETAMINOPHEN 325 MG/1
325 TABLET ORAL EVERY 6 HOURS
Qty: 240 | Refills: 0 | Status: ACTIVE | COMMUNITY

## 2023-02-01 RX ORDER — PSYLLIUM HUSK 100 %
POWDER (GRAM) MISCELLANEOUS
Refills: 0 | Status: ACTIVE | COMMUNITY

## 2023-02-02 NOTE — PRE-OP CHECKLIST - NS PREOP CHK TEST_COVID RESULT_GEN_ALL_CORE
Negative
Negative
Infliximab Counseling:  I discussed with the patient the risks of infliximab including but not limited to myelosuppression, immunosuppression, autoimmune hepatitis, demyelinating diseases, lymphoma, and serious infections.  The patient understands that monitoring is required including a PPD at baseline and must alert us or the primary physician if symptoms of infection or other concerning signs are noted.

## 2023-02-06 ENCOUNTER — APPOINTMENT (OUTPATIENT)
Dept: CARDIOTHORACIC SURGERY | Facility: CLINIC | Age: 63
End: 2023-02-06
Payer: MEDICAID

## 2023-02-06 ENCOUNTER — APPOINTMENT (OUTPATIENT)
Dept: HEART FAILURE | Facility: CLINIC | Age: 63
End: 2023-02-06
Payer: SELF-PAY

## 2023-02-06 VITALS
HEART RATE: 86 BPM | DIASTOLIC BLOOD PRESSURE: 75 MMHG | TEMPERATURE: 98.4 F | SYSTOLIC BLOOD PRESSURE: 120 MMHG | WEIGHT: 183 LBS | BODY MASS INDEX: 27.74 KG/M2 | RESPIRATION RATE: 16 BRPM | HEIGHT: 68 IN | OXYGEN SATURATION: 100 %

## 2023-02-06 VITALS
HEART RATE: 90 BPM | DIASTOLIC BLOOD PRESSURE: 70 MMHG | TEMPERATURE: 98.6 F | OXYGEN SATURATION: 99 % | SYSTOLIC BLOOD PRESSURE: 115 MMHG | HEIGHT: 68 IN | WEIGHT: 183.4 LBS | BODY MASS INDEX: 27.8 KG/M2

## 2023-02-06 DIAGNOSIS — E78.41 ELEVATED LIPOPROTEIN(A): ICD-10-CM

## 2023-02-06 DIAGNOSIS — Z87.19 PERSONAL HISTORY OF OTHER DISEASES OF THE DIGESTIVE SYSTEM: ICD-10-CM

## 2023-02-06 DIAGNOSIS — Z95.818 OTHER SPECIFIED POSTPROCEDURAL STATES: ICD-10-CM

## 2023-02-06 DIAGNOSIS — I34.0 NONRHEUMATIC MITRAL (VALVE) INSUFFICIENCY: ICD-10-CM

## 2023-02-06 DIAGNOSIS — N18.6 END STAGE RENAL DISEASE: ICD-10-CM

## 2023-02-06 DIAGNOSIS — I50.22 CHRONIC SYSTOLIC (CONGESTIVE) HEART FAILURE: ICD-10-CM

## 2023-02-06 DIAGNOSIS — E11.9 TYPE 2 DIABETES MELLITUS W/OUT COMPLICATIONS: ICD-10-CM

## 2023-02-06 DIAGNOSIS — Z98.890 OTHER SPECIFIED POSTPROCEDURAL STATES: ICD-10-CM

## 2023-02-06 DIAGNOSIS — Z09 ENCOUNTER FOR FOLLOW-UP EXAMINATION AFTER COMPLETED TREATMENT FOR CONDITIONS OTHER THAN MALIGNANT NEOPLASM: ICD-10-CM

## 2023-02-06 DIAGNOSIS — I10 ESSENTIAL (PRIMARY) HYPERTENSION: ICD-10-CM

## 2023-02-06 DIAGNOSIS — I25.10 ATHEROSCLEROTIC HEART DISEASE OF NATIVE CORONARY ARTERY W/OUT ANGINA PECTORIS: ICD-10-CM

## 2023-02-06 DIAGNOSIS — E78.5 HYPERLIPIDEMIA, UNSPECIFIED: ICD-10-CM

## 2023-02-06 PROCEDURE — 99024 POSTOP FOLLOW-UP VISIT: CPT

## 2023-02-06 PROCEDURE — 99215 OFFICE O/P EST HI 40 MIN: CPT

## 2023-02-06 RX ORDER — ATORVASTATIN CALCIUM 80 MG/1
80 TABLET, FILM COATED ORAL
Qty: 30 | Refills: 0 | Status: COMPLETED | COMMUNITY
End: 2023-02-06

## 2023-02-06 RX ORDER — SIMETHICONE CHEW TAB 80 MG 80 MG
80 TABLET ORAL 3 TIMES DAILY
Refills: 0 | Status: DISCONTINUED | COMMUNITY
End: 2023-02-06

## 2023-02-06 RX ORDER — ALOGLIPTIN 12.5 MG/1
12.5 TABLET, FILM COATED ORAL DAILY
Refills: 0 | Status: ACTIVE | COMMUNITY

## 2023-02-06 RX ORDER — SENNOSIDES 8.6 MG TABLETS 8.6 MG/1
8.6 TABLET ORAL
Qty: 60 | Refills: 0 | Status: DISCONTINUED | COMMUNITY
End: 2023-02-06

## 2023-02-06 RX ORDER — INSULIN GLARGINE 100 [IU]/ML
100 INJECTION, SOLUTION SUBCUTANEOUS DAILY
Refills: 0 | Status: ACTIVE | COMMUNITY

## 2023-02-06 NOTE — END OF VISIT
[FreeTextEntry3] : \par I personally scribed for DANNYPELON on Feb 6 2023 11:30AM . \par \par \par \par \par Physician Attestation:\par Documented by GISELLA BOSE acting as a scribe for DANNYJANKRISTEN 02/06/2023 . \par                 All medical record entries made by the Scribe were at my, PELON DELGADO , direction and personally dictated by me on 02/06/2023 . I have reviewed the chart and agree that the record accurately reflects my personal performance of the history, physical exam, assessment and plan\par \par

## 2023-02-06 NOTE — PHYSICAL EXAM
[] : no respiratory distress [Respiration, Rhythm And Depth] : normal respiratory rhythm and effort [Auscultation Breath Sounds / Voice Sounds] : lungs were clear to auscultation bilaterally [Apical Impulse] : the apical impulse was normal [Heart Rate And Rhythm] : heart rate was normal and rhythm regular [Heart Sounds] : normal S1 and S2 [Murmurs] : no murmurs [Clean] : clean [Dry] : dry [Healing Well] : healing well [Pitting Edema] : pitting edema present [___ +] : bilateral ankle [unfilled]U+ pitting edema [FreeTextEntry3] : B/L groin

## 2023-02-06 NOTE — REASON FOR VISIT
[Family Member] : family member [de-identified] : Placement of a MitraClip via a right common femoral venous percutaneous, transseptal approach [de-identified] : 12/16/22

## 2023-02-06 NOTE — ASSESSMENT
[FreeTextEntry1] : Mr. REAL is a 62 year old male who got transferred from  [facility] to SICU c/f STEMI, abd US +GB stones, pericholecystic fluid . 11/25 VA ECMO s/p hypoxic respiratory arrest/PEA arrest, CTH 4mm subdural, ERIC \par 12/5 CVVHD, RUQ US neg . 12/6 IABP placed in cath lab, LIMA to LAD patent, RCA and LCx down, CTH \par subdural decreased in size, persistent pancreatic inflammation .12/8 ECMO decannulation, aflutter DCCV x 2 \par 12/12 Mental status change- CTH no change, CT perfusion/CTA H/N neg for LVO, +low grade watershed infarct to L MCA . \par \par He is S/P Placement of a MitraClip via a right common femoral venous percutaneous, transseptal approach on 12/16/22. Post op course significant with 12/17 IABP d/matt .12/26 +clinton BCx . 12/27 R groin vac . 1/8 trach decannulation . 1/10 passed FEEST . 1/11 R permacath placed by IR .Continue capsofungin as per Dr. Rodrigues. Colorectal vizcaino for rectal pain, rectal fissure-- hydrocortisone . Add Metamucil rectal fissure pain.RT  groin vac \par d/matt,  aquacell with tegraderm to rt groin. no further follow-up required as per GI for gallstone pancreatitis. Discharged to Home. He is here for post op visit. \par \par This visit , he reports that he is doing well. His pedal edema is getting better. Saw Heart Failure  today, added hydralazine. +RT AVF , +RT Permacath ( TUE-THURS-SAT)  Denies any chest pain ,shortness of breath, palpitations, dizziness.\par \par Today on exam patient's lungs clear bilaterally, normal sinus rhythm, B/L Groin site is clean, dry and intact.  +1  peripheral edema noted.  Instructed patient on importance of optimal glycemic control, daily showering, daily weights, any signs of fever (temperature greater than 101F, chills,  incentive spirometer use, and increase ambulation as tolerated. Instructed to call office with any signs or symptoms of infection or weight gain of 2 or more pounds in 1 day or 3 or more pounds in 1 week.  \par \par \par Plan:\par 1. Continue current medication regimen\par 2. Follow up with cardiologist ( Dr. Abdirahman Licona) and PCP \par 3. May return on as needed basis \par 4. Follow up with HF ( Dr.Sirish Bills) on 2/6/23 \par 5. Follow up with Vascular ( Dr.William West) on 2/9/23 \par 6. Follow up with Renal ( Dr.Nupur Strauss) on 2/24/23 \par 7. Follow up with Endo ( Dr.Sajid Quiles) , saw already \par 8. SBE antibiotic prophylaxis discussed at length \par 9. Continue to increase activity and walk daily as tolerated. Continue to use incentive spirometer. \par 10. Keep legs elevated above heart when resting/sitting/sleeping. \par 11.  Call MD if you experience fever, fatigue, dizziness, confusion, syncope, shortness of breath, chest pain not relieved with analgesics, increased redness/drainage from the surgical  incision site\par 12. Needs to discontinue RT PermCath in 1- 2 months, follow up with  \par \par \par

## 2023-02-06 NOTE — CONSULT LETTER
[Courtesy Letter:] : I had the pleasure of seeing your patient, [unfilled], in my office today. [Please see my note below.] : Please see my note below. [Consult Closing:] : Thank you very much for allowing me to participate in the care of this patient.  If you have any questions, please do not hesitate to contact me. [Sincerely,] : Sincerely, [Dear  ___] : Dear  [unfilled], [FreeTextEntry2] : Dr.Sean craig [FreeTextEntry3] : Doc Rodrigues MD\par Attending Surgeon \par [facility]\par  \par Cardiovascular & Thoracic Surgery\par [employer]\par \par

## 2023-02-06 NOTE — DISCUSSION/SUMMARY
[Doing Well] : is doing well [Excellent Pain Control] : has excellent pain control [No Sign of Infection] : is showing no signs of infection [0] : 0 [Coumadin] : the patient is not on Coumadin

## 2023-02-08 PROBLEM — N18.6 END STAGE CHRONIC KIDNEY DISEASE: Status: ACTIVE | Noted: 2023-02-06

## 2023-02-08 NOTE — HISTORY OF PRESENT ILLNESS
[FreeTextEntry1] : 63 YO M with a history of CAD s/p 4v CABG (~2019 in [facility]), HTN, DM2 (A1c 7.3%)  newly diagnosed ICM HFrEF ( LVIDD 6.3 LVEF 26%). Initially diagnosed at OSH with pancreatitis that was complicated by persistent Hypoxic respiratory failure and hypotension requiring VA ECMO, CRRT, and trach ventilation.  He presents to the clinic today for follow-up management of his cardiomyopathy after recent hospitalization. \par \par Pt originally presented to [facility] on 11/24/23 with abdominal pain and n/v CT abdomen showed acute pancreatitis and the lipase was > 3000 , Troponins were elevated at 87. his initial LVEF was 40-45%. He developed MSOF with hypoxic respiratory failure requiring intubation and ERIC. He went into hypoxic PEA arrest requiring ACLS. Due to persistent hypoxia and hypotension on pressors after ROSC he was cannulated to VA ECMO (LFV, RFA, no anterograde perfusion catheter) on 11/25 and transferred to [facility]. Notably CTH that day revealed small subdural hemorrhage.  His post arrest TTE on 11/26 showed a significantly worse LVEF of 5-10% with an AV that was only minimally opening. He had improvement in his LV function to ~35-40% and improved pulsatility while tolerating progressive slow ECMO weans.  LHC 12/06 showed closure of his 3 vein grafts with graft to LAD patent though with distal native disease. No coronary intervention was done. \par \par The IABP placed, ECMO successfully decannulated 12/08 (transfused 2u pRBCs during).  His ARDS has improved and he's now s/p trach (decannulated 1/8).  IABP  dc'ed 12/17, and inotropes titrated off.  He had been unable to tolerate GDMT due to soft BPs on midodrine and renal failure requiring dialysis. Course was further complicated by fungemia with cultures from 12/26 showing Candida tropicalis, that was treated with caspofungin.  Has been weaned off Midodrine and was discharged 1/25/23 on metoprolol 50 mg QD, with outpatient dialysis, and his last standing weight was 190 lbs.  \par \par He is accompanied by his daughter today. States he has still been feeling weak since discharged but, overall he has improved. He arrived to the office via wheelchair, at home he uses a walker and can walk about 25ft before he stops for fatigue. He has his first physical therapy session wednesday and is scheduled for 3x a week. He has been taking his blood pressure daily and reports 1 hypotensive episode at dialysis, states he had taken his metoprolol early before  his session that day. Daily weights are stable at 181-182 lbs. He eats 5 to 6 low sodium meals and drinks <1 L of fluid a day.  Makes urine and is on dialysis (TTS), admits to constipation which causes pain to anal fissures, pt said miralax gave him diarrhea and he is open to trying something else.  Denies LH/Dizziness, SOB, BRUNSON, PND, Orthopnea, CP, Palpitations, abdominal tightsness, and LE.

## 2023-02-08 NOTE — ADDENDUM
[FreeTextEntry1] : Pt seen and examined with Larry Quinn NP. I was physically present for the key portions of the evaluation and management provided. I agree with the above history, physical, and plan which I have reviewed and edited where appropriate. \par \par Lynda Tanner NP

## 2023-02-08 NOTE — CARDIOLOGY SUMMARY
[de-identified] : \par TTE 1/9/23 - EF 25%, mild MR, min AI, no TR, no obvious valvular vegetations.\par TTE 12/19/22 - EF 24%, LVIDd 5.6, normal RV function, estimated PASP 45mmhg\par TTE (12/03/22) - mod-to-sev segmental LVSD (inferolateral, inferior, inferoseptal hypokinesis)\par  [de-identified] : \par [facility] (12/06/22) - patent LIMA-LAD, RCA , LCx , aortogram w/ closure of 3 vein grafts, LVEDP 22 mm Hg, left-to-left and left-to-right collaterals

## 2023-02-08 NOTE — ASSESSMENT
[FreeTextEntry1] :  63 YO M with a history of CAD s/p 4v CABG (~2019 in [facility]), HTN, DM2 (A1c 7.3%)  newly diagnosed ICM HFrEF ( LVIDD 6.3 LVEF 26%). Initially diagnosed at OSH with pancreatitis that was complicated by persistent Hypoxic respiratory failure and hypotension requiring VA ECMO, CRRT, and trach ventilation.  Pt is ACC/AHA Stage C exhibiting NYHA Class II symptoms. Pt is volume up with scheduled dialysis for 2/7/2023. He is normotensive with room for optimization of his neurohormonal blockade. The following recommendations have been made:\par \par Systolic congestive heart failure. \par - ischemic with most recent TTE 1/9 showing EF 25%\par - continue Toprol  50 mg PO QD notify office for HR <50\par - start hydralazine 10 mg TID (hold on HD days and notify office for BP <90)\par - continue taking b/p and weights at home and bring log with you to your next visit\par - Pt has sennakot  tablets at home, encouraged pt to give them a try since the miralax was to potent\par - Maintain a low sodium diet and fluids </= 1L. let the office know if  you need help with your dieting and we will refer to our dietician Alisa Gunderson. \par -  Will repeat TTE once on maximally tolerated GDMT and if if LVEF remains </= 35% will discuss ICD for primary prevention.\par - currently enrolled in PT however, list provided for cardiac rehab for when his PT sessions are completed to main activity tolerance. \par - Heart Failure book given to patient for further reinforcement of the education provided in the clinic. \par - Will try to obtain Labs from Dialysis center\par \par CAD\par - LHC 12/06 with IABP placement revealed that 3 grafts are closed w/ distal native disease from remaining LAD graft\par - continue statin and aspirin.\par - BB as stated above.\par \par End Stage Renal Disease\par -Pt receives HD at [facility] on TTS \par -Continue Taking Sevelamer Carbonate as prescribed.\par \par Follow up with NP on 2/24/2023 via Telehealth, depending on insurance. We currently do not accept patients insurance but daughter is looking into switching to an insurance provider that is accepted by the clinic.

## 2023-02-08 NOTE — PHYSICAL EXAM
[Well Developed] : well developed [No Xanthelasma] : no xanthelasma [Normal S1, S2] : normal S1, S2 [Clear Lung Fields] : clear lung fields [Soft] : abdomen soft [Non Tender] : non-tender [No Rash] : no rash [Moves all extremities] : moves all extremities [Alert and Oriented] : alert and oriented [de-identified] : Pt wearing mask  [de-identified] : JVP 10-12 cmH20 [de-identified] : Pt in wheelchair [de-identified] : BLE +1 edema, warm peripherally

## 2023-02-11 LAB
BASOPHILS # BLD AUTO: 0.07 K/UL
BASOPHILS NFR BLD AUTO: 0.6 %
EOSINOPHIL # BLD AUTO: 0.19 K/UL
EOSINOPHIL NFR BLD AUTO: 1.7 %
HCT VFR BLD CALC: 34.1 %
HGB BLD-MCNC: 10.3 G/DL
IMM GRANULOCYTES NFR BLD AUTO: 0.7 %
LYMPHOCYTES # BLD AUTO: 2.39 K/UL
LYMPHOCYTES NFR BLD AUTO: 22 %
MAN DIFF?: NORMAL
MCHC RBC-ENTMCNC: 29.7 PG
MCHC RBC-ENTMCNC: 30.2 GM/DL
MCV RBC AUTO: 98.3 FL
MONOCYTES # BLD AUTO: 0.86 K/UL
MONOCYTES NFR BLD AUTO: 7.9 %
NEUTROPHILS # BLD AUTO: 7.29 K/UL
NEUTROPHILS NFR BLD AUTO: 67.1 %
PLATELET # BLD AUTO: 218 K/UL
RBC # BLD: 3.47 M/UL
RBC # FLD: 16.8 %
WBC # FLD AUTO: 10.88 K/UL

## 2023-02-16 LAB — BACTERIA BLD CULT: NORMAL

## 2023-02-22 RX ORDER — ROSUVASTATIN CALCIUM 10 MG/1
10 TABLET, FILM COATED ORAL
Qty: 90 | Refills: 2 | Status: ACTIVE | COMMUNITY
Start: 2023-02-06 | End: 1900-01-01

## 2023-02-22 RX ORDER — METOPROLOL SUCCINATE 50 MG/1
50 TABLET, EXTENDED RELEASE ORAL
Qty: 30 | Refills: 2 | Status: ACTIVE | COMMUNITY
Start: 1900-01-01 | End: 1900-01-01

## 2023-02-24 ENCOUNTER — APPOINTMENT (OUTPATIENT)
Dept: HEART FAILURE | Facility: CLINIC | Age: 63
End: 2023-02-24

## 2023-02-24 ENCOUNTER — NON-APPOINTMENT (OUTPATIENT)
Age: 63
End: 2023-02-24

## 2023-02-24 ENCOUNTER — APPOINTMENT (OUTPATIENT)
Dept: NEPHROLOGY | Facility: CLINIC | Age: 63
End: 2023-02-24
Payer: SELF-PAY

## 2023-02-24 VITALS
HEART RATE: 87 BPM | OXYGEN SATURATION: 98 % | DIASTOLIC BLOOD PRESSURE: 61 MMHG | WEIGHT: 176 LBS | TEMPERATURE: 97.7 F | SYSTOLIC BLOOD PRESSURE: 100 MMHG | HEIGHT: 68 IN | BODY MASS INDEX: 26.67 KG/M2

## 2023-02-24 DIAGNOSIS — N17.0 ACUTE KIDNEY FAILURE WITH TUBULAR NECROSIS: ICD-10-CM

## 2023-02-24 DIAGNOSIS — D64.9 ANEMIA, UNSPECIFIED: ICD-10-CM

## 2023-02-24 DIAGNOSIS — N17.9 ACUTE KIDNEY FAILURE, UNSPECIFIED: ICD-10-CM

## 2023-02-24 DIAGNOSIS — E87.70 FLUID OVERLOAD, UNSPECIFIED: ICD-10-CM

## 2023-02-24 DIAGNOSIS — E83.39 OTHER DISORDERS OF PHOSPHORUS METABOLISM: ICD-10-CM

## 2023-02-24 PROCEDURE — 99215 OFFICE O/P EST HI 40 MIN: CPT

## 2023-02-24 RX ORDER — OMEPRAZOLE 20 MG/1
20 TABLET, DELAYED RELEASE ORAL
Qty: 90 | Refills: 1 | Status: DISCONTINUED | COMMUNITY
End: 2023-02-24

## 2023-02-24 NOTE — ASSESSMENT
[FreeTextEntry1] : ERIC (acute kidney injury): Pt with ERIC/ ATN during recent hospitalization in the setting of STEMI, cardiac arrest & cardiogenic shock. SCr on arrival was 2.15; trended down to hector 1.6 on 11/25; slowly trended up & increased to 3.95 on 11/28/22. Pt initiated on CRRT/CVVHDF to optimize volume and electrolytes from 12/5-12/19/22, and was switched to iHD thereafter.  \par Pt. continued to remain oliguric and dialysis dependent in the hsp and was discharged on HD. He is currently receiving HD on TTS at [facility] at [place], under the care of nephrologist Dr. Alla Goldberg.\par States that he has been making 600-700 cc of urine per day and get fluid removal of ~2-3 lbs per HD session based on his weight gain. \par Check renal panel, UA and spot urine TP/Cr. \par Continue to monitor for renal recovery. \par Avoid any potential nephrotoxins.\par \par Anemia: in the setting of oliguric ERIC. continue MIGUEL, to maintain goal >10.\par \par Hyperphosphatemia: in the setting of ERIC. Recent phos level at goal. continue sevelamer. Monitor Ca, phos levels.\par \par Hypervolemia: in the setting of ERIC and HF. does not appear volume overloaded on exam. Not on any diuretics at present. continue thrice weekly HD with UF. MOnitor weight. Encouraged physical activity. \par

## 2023-02-24 NOTE — PHYSICAL EXAM
[General Appearance - Alert] : alert [General Appearance - In No Acute Distress] : in no acute distress [General Appearance - Well Nourished] : well nourished [Outer Ear] : the ears and nose were normal in appearance [Hearing Threshold Finger Rub Not [place]] : hearing was normal [Jugular Venous Distention Increased] : there was no jugular-venous distention [] : no respiratory distress [Exaggerated Use Of Accessory Muscles For Inspiration] : no accessory muscle use [Auscultation Breath Sounds / Voice Sounds] : lungs were clear to auscultation bilaterally [Heart Sounds] : normal S1 and S2 [Heart Sounds Pericardial Friction Rub] : no pericardial rub [Edema] : there was no peripheral edema [Abdomen Soft] : soft [No CVA Tenderness] : no ~M costovertebral angle tenderness [___ (cm) Fistula] : [unfilled] (cm) fistula [Dialysis Catheter] : dialysis catheter [Bruit] : a bruit was present [Thrill] : a thrill was present [FreeTextEntry1] : RIJ tunneled dialysis catheter in situ [Oriented To Time, Place, And Person] : oriented to person, place, and time [Affect] : the affect was normal [Mood] : the mood was normal

## 2023-02-24 NOTE — CONSULT LETTER
[Dear  ___] : Dear  [unfilled], [Courtesy Letter:] : I had the pleasure of seeing your patient, [unfilled], in my office today. [Please see my note below.] : Please see my note below. [Consult Closing:] : Thank you very much for allowing me to participate in the care of this patient.  If you have any questions, please do not hesitate to contact me. [Sincerely,] : Sincerely, [FreeTextEntry3] : Isabella Strauss MD [DrMinesh  ___] : Dr. CASAS

## 2023-02-24 NOTE — HISTORY OF PRESENT ILLNESS
[FreeTextEntry1] : Pt. here for follow up of ERIC after recent hospitalization. He is accompanied by his son during the visit.\par \par Mr. Marley is a 63 YO M with a history of CAD s/p 4v CABG (~2019 in [facility]), HTN, DM2,  newly diagnosed ICM HFrEF ( LVIDD 6.3 LVEF 26%). Pt. had recent hospitalization for pancreatitis that was complicated by persistent Hypoxic respiratory failure and hypotension requiring VA ECMO, CRRT for ERIC, and trach ventilation. He underwent mitraclip placement via percutaneous approach on 12/16/22. He remained dialysis dependent at the time of discharge from the [facility] and continues to receive iHD three times weekly on TTS since the discharge. \par \par He states he has still been feeling weak since discharged but, overall he has markedly improved. He is trying to be more physically active and ambulate without the wheelchair at home. He states that his UO has improved since [facility] discharge, and makes ~600-700 cc per day of urine. States that he has been tolerating HD sessions well and denies any intradialytic complications. Denies LH/Dizziness, SOB, BRUNSON, PND, Orthopnea, CP, Palpitations, abdominal tightsness, and LE swelling. Denies use of NSAIDs, PPIs or herbal supplements.\par \par \par

## 2023-03-01 LAB
ALBUMIN SERPL ELPH-MCNC: 4.3 G/DL
ANION GAP SERPL CALC-SCNC: 15 MMOL/L
APPEARANCE: CLEAR
BACTERIA: ABNORMAL
BASOPHILS # BLD AUTO: 0.07 K/UL
BASOPHILS NFR BLD AUTO: 0.6 %
BILIRUBIN URINE: NEGATIVE
BLOOD URINE: NEGATIVE
BUN SERPL-MCNC: 52 MG/DL
CALCIUM SERPL-MCNC: 9.8 MG/DL
CHLORIDE SERPL-SCNC: 99 MMOL/L
CO2 SERPL-SCNC: 24 MMOL/L
COLOR: YELLOW
CREAT SERPL-MCNC: 2.8 MG/DL
CREAT SPEC-SCNC: 231 MG/DL
CREAT/PROT UR: 0.2 RATIO
EGFR: 25 ML/MIN/1.73M2
EOSINOPHIL # BLD AUTO: 0.26 K/UL
EOSINOPHIL NFR BLD AUTO: 2.3 %
GLUCOSE QUALITATIVE U: NEGATIVE
GLUCOSE SERPL-MCNC: 162 MG/DL
HCT VFR BLD CALC: 40.7 %
HGB BLD-MCNC: 12.3 G/DL
HYALINE CASTS: 0 /LPF
IMM GRANULOCYTES NFR BLD AUTO: 0.4 %
KETONES URINE: NEGATIVE
LEUKOCYTE ESTERASE URINE: ABNORMAL
LYMPHOCYTES # BLD AUTO: 3.27 K/UL
LYMPHOCYTES NFR BLD AUTO: 29.2 %
MAN DIFF?: NORMAL
MCHC RBC-ENTMCNC: 29.3 PG
MCHC RBC-ENTMCNC: 30.2 GM/DL
MCV RBC AUTO: 96.9 FL
MICROSCOPIC-UA: NORMAL
MONOCYTES # BLD AUTO: 0.91 K/UL
MONOCYTES NFR BLD AUTO: 8.1 %
NEUTROPHILS # BLD AUTO: 6.62 K/UL
NEUTROPHILS NFR BLD AUTO: 59.4 %
NITRITE URINE: NEGATIVE
PH URINE: 5.5
PHOSPHATE SERPL-MCNC: 5.4 MG/DL
PLATELET # BLD AUTO: 187 K/UL
POTASSIUM SERPL-SCNC: 4.3 MMOL/L
PROT UR-MCNC: 40 MG/DL
PROTEIN URINE: ABNORMAL
RBC # BLD: 4.2 M/UL
RBC # FLD: 16.9 %
RED BLOOD CELLS URINE: 2 /HPF
SODIUM SERPL-SCNC: 138 MMOL/L
SPECIFIC GRAVITY URINE: 1.02
SQUAMOUS EPITHELIAL CELLS: 1 /HPF
UROBILINOGEN URINE: NORMAL
WBC # FLD AUTO: 11.18 K/UL
WHITE BLOOD CELLS URINE: 258 /HPF

## 2023-03-23 RX ORDER — ASPIRIN ENTERIC COATED TABLETS 81 MG 81 MG/1
81 TABLET, DELAYED RELEASE ORAL
Qty: 30 | Refills: 0 | Status: ACTIVE | COMMUNITY
Start: 1900-01-01 | End: 1900-01-01

## 2023-03-28 NOTE — PRE-OP CHECKLIST - TAMPON REMOVED
Labs have been ordered per Dr. Roman standing lab orders protocol.    
Patient coming in for labs on 04/04/2023 order by KAUSHAL Roman MD. Lab orders are missing. Please enter orders prior to appointment.    Thank you.  
n/a

## 2023-04-13 ENCOUNTER — APPOINTMENT (OUTPATIENT)
Dept: CARDIOLOGY | Facility: CLINIC | Age: 63
End: 2023-04-13

## 2023-04-15 NOTE — DIETITIAN INITIAL EVALUATION ADULT - REASON INDICATOR FOR ASSESSMENT
Walk in Private Auto Assessment warranted for ICU length of stay.  Information obtained from team, EMR.

## 2023-04-22 ENCOUNTER — NON-APPOINTMENT (OUTPATIENT)
Age: 63
End: 2023-04-22

## 2023-04-23 ENCOUNTER — EMERGENCY (EMERGENCY)
Facility: HOSPITAL | Age: 63
LOS: 1 days | Discharge: HOME | End: 2023-04-23
Attending: EMERGENCY MEDICINE
Payer: MEDICAID

## 2023-04-23 VITALS
SYSTOLIC BLOOD PRESSURE: 106 MMHG | TEMPERATURE: 98 F | RESPIRATION RATE: 19 BRPM | OXYGEN SATURATION: 98 % | HEART RATE: 86 BPM | DIASTOLIC BLOOD PRESSURE: 70 MMHG

## 2023-04-23 VITALS
DIASTOLIC BLOOD PRESSURE: 64 MMHG | RESPIRATION RATE: 18 BRPM | SYSTOLIC BLOOD PRESSURE: 100 MMHG | TEMPERATURE: 98 F | WEIGHT: 175.05 LBS | OXYGEN SATURATION: 99 % | HEIGHT: 68 IN | HEART RATE: 85 BPM

## 2023-04-23 LAB
ALBUMIN SERPL ELPH-MCNC: 3.7 G/DL — SIGNIFICANT CHANGE UP (ref 3.5–5)
ALP SERPL-CCNC: 75 U/L — SIGNIFICANT CHANGE UP (ref 40–120)
ALT FLD-CCNC: 18 U/L DA — SIGNIFICANT CHANGE UP (ref 10–60)
ANION GAP SERPL CALC-SCNC: 9 MMOL/L — SIGNIFICANT CHANGE UP (ref 5–17)
AST SERPL-CCNC: 12 U/L — SIGNIFICANT CHANGE UP (ref 10–40)
BASOPHILS # BLD AUTO: 0.06 K/UL — SIGNIFICANT CHANGE UP (ref 0–0.2)
BASOPHILS NFR BLD AUTO: 0.6 % — SIGNIFICANT CHANGE UP (ref 0–2)
BILIRUB SERPL-MCNC: 0.7 MG/DL — SIGNIFICANT CHANGE UP (ref 0.2–1.2)
BUN SERPL-MCNC: 102 MG/DL — HIGH (ref 7–18)
CALCIUM SERPL-MCNC: 9.5 MG/DL — SIGNIFICANT CHANGE UP (ref 8.4–10.5)
CHLORIDE SERPL-SCNC: 97 MMOL/L — SIGNIFICANT CHANGE UP (ref 96–108)
CK SERPL-CCNC: 106 U/L — SIGNIFICANT CHANGE UP (ref 35–232)
CO2 SERPL-SCNC: 28 MMOL/L — SIGNIFICANT CHANGE UP (ref 22–31)
CREAT SERPL-MCNC: 3.71 MG/DL — HIGH (ref 0.5–1.3)
EGFR: 18 ML/MIN/1.73M2 — LOW
EOSINOPHIL # BLD AUTO: 0.28 K/UL — SIGNIFICANT CHANGE UP (ref 0–0.5)
EOSINOPHIL NFR BLD AUTO: 2.7 % — SIGNIFICANT CHANGE UP (ref 0–6)
GLUCOSE SERPL-MCNC: 176 MG/DL — HIGH (ref 70–99)
HCT VFR BLD CALC: 41.9 % — SIGNIFICANT CHANGE UP (ref 39–50)
HGB BLD-MCNC: 13.5 G/DL — SIGNIFICANT CHANGE UP (ref 13–17)
IMM GRANULOCYTES NFR BLD AUTO: 0.9 % — SIGNIFICANT CHANGE UP (ref 0–0.9)
LYMPHOCYTES # BLD AUTO: 2.46 K/UL — SIGNIFICANT CHANGE UP (ref 1–3.3)
LYMPHOCYTES # BLD AUTO: 23.8 % — SIGNIFICANT CHANGE UP (ref 13–44)
MAGNESIUM SERPL-MCNC: 2.8 MG/DL — HIGH (ref 1.6–2.6)
MCHC RBC-ENTMCNC: 28.7 PG — SIGNIFICANT CHANGE UP (ref 27–34)
MCHC RBC-ENTMCNC: 32.2 GM/DL — SIGNIFICANT CHANGE UP (ref 32–36)
MCV RBC AUTO: 89 FL — SIGNIFICANT CHANGE UP (ref 80–100)
MONOCYTES # BLD AUTO: 1.01 K/UL — HIGH (ref 0–0.9)
MONOCYTES NFR BLD AUTO: 9.8 % — SIGNIFICANT CHANGE UP (ref 2–14)
NEUTROPHILS # BLD AUTO: 6.42 K/UL — SIGNIFICANT CHANGE UP (ref 1.8–7.4)
NEUTROPHILS NFR BLD AUTO: 62.2 % — SIGNIFICANT CHANGE UP (ref 43–77)
NRBC # BLD: 0 /100 WBCS — SIGNIFICANT CHANGE UP (ref 0–0)
PLATELET # BLD AUTO: 171 K/UL — SIGNIFICANT CHANGE UP (ref 150–400)
POTASSIUM SERPL-MCNC: 3.5 MMOL/L — SIGNIFICANT CHANGE UP (ref 3.5–5.3)
POTASSIUM SERPL-SCNC: 3.5 MMOL/L — SIGNIFICANT CHANGE UP (ref 3.5–5.3)
PROT SERPL-MCNC: 9.2 G/DL — HIGH (ref 6–8.3)
RBC # BLD: 4.71 M/UL — SIGNIFICANT CHANGE UP (ref 4.2–5.8)
RBC # FLD: 15.9 % — HIGH (ref 10.3–14.5)
SODIUM SERPL-SCNC: 134 MMOL/L — LOW (ref 135–145)
WBC # BLD: 10.32 K/UL — SIGNIFICANT CHANGE UP (ref 3.8–10.5)
WBC # FLD AUTO: 10.32 K/UL — SIGNIFICANT CHANGE UP (ref 3.8–10.5)

## 2023-04-23 PROCEDURE — 82962 GLUCOSE BLOOD TEST: CPT

## 2023-04-23 PROCEDURE — 99285 EMERGENCY DEPT VISIT HI MDM: CPT

## 2023-04-23 PROCEDURE — 71045 X-RAY EXAM CHEST 1 VIEW: CPT

## 2023-04-23 PROCEDURE — 80053 COMPREHEN METABOLIC PANEL: CPT

## 2023-04-23 PROCEDURE — 82550 ASSAY OF CK (CPK): CPT

## 2023-04-23 PROCEDURE — 71045 X-RAY EXAM CHEST 1 VIEW: CPT | Mod: 26

## 2023-04-23 PROCEDURE — 93010 ELECTROCARDIOGRAM REPORT: CPT

## 2023-04-23 PROCEDURE — 83735 ASSAY OF MAGNESIUM: CPT

## 2023-04-23 PROCEDURE — 99285 EMERGENCY DEPT VISIT HI MDM: CPT | Mod: 25

## 2023-04-23 PROCEDURE — 93005 ELECTROCARDIOGRAM TRACING: CPT

## 2023-04-23 PROCEDURE — 85025 COMPLETE CBC W/AUTO DIFF WBC: CPT

## 2023-04-23 PROCEDURE — 36415 COLL VENOUS BLD VENIPUNCTURE: CPT

## 2023-04-23 NOTE — ED PROVIDER NOTE - OBJECTIVE STATEMENT
63-year-old man, history of diabetes, prior MI, pancreatitis, kidney disease, currently being weaned off of dialysis in conjunction with management advised by his nephrologist, was recently treated with baclofen for hiccups starting Thursday, April 20, 2023.  The next day after his evening dose, he started to feel very anxious and was continuously pacing around according to his son.  At that time he stopped taking the baclofen.  He was later told by his doctor to resume the baclofen and take 1 more dose, which she did and the pacing and anxiety sensation resolved.  However he is primarily concerned that he has not been able to get any sleep at all since Thursday night.  He also has generalized weakness, dizziness, and today felt difficulty catching his breath and cough.  Denies any fever/chest pain/syncope/focal weakness/tremors/seizure-like activity.  He has not had any changes in his regular medications, which include metoprolol, atorvastatin, aspirin, sevelamer, insulin, hydralazine, and alogliptan.  He denies any use of alcohol/drugs/tobacco.  Last HD was Thursday, 4/20/23, and his next HD is scheduled for 2 weeks from now.

## 2023-04-23 NOTE — ED PROVIDER NOTE - NSICDXPASTMEDICALHX_GEN_ALL_CORE_FT
PAST MEDICAL HISTORY:  Gall stone     H/O acute myocardial infarction     H/O acute pancreatitis     H/O diabetes mellitus     H/O kidney disease

## 2023-04-23 NOTE — ED PROVIDER NOTE - NEUROLOGICAL, MLM
Alert and oriented, no focal deficits, no motor or sensory deficits.  Normal coordination/mental status/speech.  No tremors.

## 2023-04-23 NOTE — ED PROVIDER NOTE - NSFOLLOWUPINSTRUCTIONS_ED_ALL_ED_FT
Insomnia  Insomnia is a sleep disorder that makes it difficult to fall asleep or stay asleep. Insomnia can cause fatigue, low energy, difficulty concentrating, mood swings, and poor performance at work or school.    There are three different ways to classify insomnia:  Difficulty falling asleep.  Difficulty staying asleep.  Waking up too early in the morning.  Any type of insomnia can be long-term (chronic) or short-term (acute). Both are common. Short-term insomnia usually lasts for 3 months or less. Chronic insomnia occurs at least three times a week for longer than 3 months.    What are the causes?  Insomnia may be caused by another condition, situation, or substance, such as:  Having certain mental health conditions, such as anxiety and depression.  Using caffeine, alcohol, tobacco, or drugs.  Having gastrointestinal conditions, such as gastroesophageal reflux disease (GERD).  Having certain medical conditions. These include:  Asthma.  Alzheimer's disease.  Stroke.  Chronic pain.  An overactive thyroid gland (hyperthyroidism).  Other sleep disorders, such as restless legs syndrome and sleep apnea.  Menopause.  Sometimes, the cause of insomnia may not be known.    What increases the risk?  Risk factors for insomnia include:  Gender. Females are affected more often than males.  Age. Insomnia is more common as people get older.  Stress and certain medical and mental health conditions.  Lack of exercise.  Having an irregular work schedule. This may include working night shifts and traveling between different time zones.  What are the signs or symptoms?  If you have insomnia, the main symptom is having trouble falling asleep or having trouble staying asleep. This may lead to other symptoms, such as:  Feeling tired or having low energy.  Feeling nervous about going to sleep.  Not feeling rested in the morning.  Having trouble concentrating.  Feeling irritable, anxious, or depressed.  How is this diagnosed?  This condition may be diagnosed based on:  Your symptoms and medical history. Your health care provider may ask about:  Your sleep habits.  Any medical conditions you have.  Your mental health.  A physical exam.  How is this treated?  Treatment for insomnia depends on the cause. Treatment may focus on treating an underlying condition that is causing the insomnia. Treatment may also include:  Medicines to help you sleep.  Counseling or therapy.  Lifestyle adjustments to help you sleep better.  Follow these instructions at home:  Eating and drinking    A sign showing that a person should not drink alcohol.  Limit or avoid alcohol, caffeinated beverages, and products that contain nicotine and tobacco, especially close to bedtime. These can disrupt your sleep.  Do not eat a large meal or eat spicy foods right before bedtime. This can lead to digestive discomfort that can make it hard for you to sleep.  Sleep habits    A person writing in a diary.  Keep a sleep diary to help you and your health care provider figure out what could be causing your insomnia. Write down:  When you sleep.  When you wake up during the night.  How well you sleep and how rested you feel the next day.  Any side effects of medicines you are taking.  What you eat and drink.  Make your bedroom a dark, comfortable place where it is easy to fall asleep.  Put up shades or blackout curtains to block light from outside.  Use a white noise machine to block noise.  Keep the temperature cool.  Limit screen use before bedtime. This includes:  Not watching TV.  Not using your smartphone, tablet, or computer.  Stick to a routine that includes going to bed and waking up at the same times every day and night. This can help you fall asleep faster. Consider making a quiet activity, such as reading, part of your nighttime routine.  Try to avoid taking naps during the day so that you sleep better at night.  Get out of bed if you are still awake after 15 minutes of trying to sleep. Keep the lights down, but try reading or doing a quiet activity. When you feel sleepy, go back to bed.  General instructions    Take over-the-counter and prescription medicines only as told by your health care provider.  Exercise regularly as told by your health care provider. However, avoid exercising in the hours right before bedtime.  Use relaxation techniques to manage stress. Ask your health care provider to suggest some techniques that may work well for you. These may include:  Breathing exercises.  Routines to release muscle tension.  Visualizing peaceful scenes.  Make sure that you drive carefully. Do not drive if you feel very sleepy.  Keep all follow-up visits. This is important.  Contact a health care provider if:  You are tired throughout the day.  You have trouble in your daily routine due to sleepiness.  You continue to have sleep problems, or your sleep problems get worse.  Get help right away if:  You have thoughts about hurting yourself or someone else.  Get help right away if you feel like you may hurt yourself or others, or have thoughts about taking your own life. Go to your nearest emergency room or:  Call 911.  Call the National Suicide Prevention Lifeline at 1-393.728.4112 or 259. This is open 24 hours a day.  Text the Crisis Text Line at 551383.  Summary  Insomnia is a sleep disorder that makes it difficult to fall asleep or stay asleep.  Insomnia can be long-term (chronic) or short-term (acute).  Treatment for insomnia depends on the cause. Treatment may focus on treating an underlying condition that is causing the insomnia.  Keep a sleep diary to help you and your health care provider figure out what could be causing your insomnia.  This information is not intended to replace advice given to you by your health care provider. Make sure you discuss any questions you have with your health care provider.    Document Revised: 11/28/2022 Document Reviewed: 11/28/2022  [employer] Patient Education © 2023 [employer]              English    Chronic Kidney Disease, Adult    Chronic kidney disease (CKD) occurs when the kidneys are slowly and permanently damaged over a long period of time. The kidneys are a pair of organs that do many important jobs in the body, including:  Removing waste and extra fluid from the blood to make urine.  Making hormones that maintain the amount of fluid in tissues and blood vessels.  Maintaining the right amount of fluids and chemicals in the body.  A small amount of kidney damage may not cause problems, but a large amount of damage may make it hard or impossible for the kidneys to work right. Steps must be taken to slow kidney damage or to stop it from getting worse. If steps are not taken, the kidneys may stop working permanently (end-stage renal disease, or ESRD). Most of the time, CKD does not go away, but it can often be controlled. People who have CKD are usually able to live full lives.    What are the causes?  The most common causes of this condition are diabetes and high blood pressure (hypertension).    Other causes include:  Cardiovascular diseases. These affect the heart and blood vessels.  Kidney diseases. These include:  Glomerulonephritis, or inflammation of the tiny filters in the kidneys.  Interstitial nephritis. This is swelling of the small tubes of the kidneys and of the surrounding structures.  Polycystic kidney disease, in which clusters of fluid-filled sacs form within the kidneys.  Renal vascular disease. This includes disorders that affect the arteries and veins of the kidneys.  Diseases that affect the body's defense system (immune system).  A problem with urine flow. This may be caused by:  Kidney stones.  Cancer.  An enlarged prostate, in males.  A kidney infection or urinary tract infection (UTI) that keeps coming back.  Vasculitis. This is swelling or inflammation of the blood vessels.  What increases the risk?  Your chances of having kidney disease increase with age.    The following factors may make you more likely to develop this condition:  A family history of kidney disease or kidney failure. Kidney failure means the kidneys can no longer work right.  Certain genetic diseases.  Taking medicines often that are damaging to the kidneys.  Being around or being in contact with toxic substances.  Obesity.  A history of tobacco use.  What are the signs or symptoms?  Symptoms of this condition include:  Feeling very tired (lethargic) and having less energy.  Swelling, or edema, of the face, legs, ankles, or feet.  Nausea or vomiting, or loss of appetite.  Confusion or trouble concentrating.  Muscle twitches and cramps, especially in the legs.  Dry, itchy skin.  A metallic taste in the mouth.  Producing less urine, or producing more urine (especially at night).  Shortness of breath.  Trouble sleeping.  CKD may also result in not having enough red blood cells or hemoglobin in the blood (anemia) or having weak bones (bone disease).    Symptoms develop slowly and may not be obvious until the kidney damage becomes severe. It is possible to have kidney disease for years without having symptoms.    How is this diagnosed?  This condition may be diagnosed based on:  Blood tests.  Urine tests.  Imaging tests, such as an ultrasound or a CT scan.  A kidney biopsy. This involves removing a sample of kidney tissue to be looked at under a microscope.  Results from these tests will help to determine how serious the CKD is.    How is this treated?  There is no cure for most cases of this condition, but treatment usually relieves symptoms and prevents or slows the worsening of the disease. Treatment may include:  Diet changes, which may require you to avoid alcohol and foods that are high in salt, potassium, phosphorous, and protein.  Medicines. These may:  Lower blood pressure.  Control blood sugar (glucose).  Relieve anemia.  Relieve swelling.  Protect your bones.  Improve the balance of salts and minerals in your blood (electrolytes).  Dialysis, which is a type of treatment that removes toxic waste from the body. It may be needed if you have kidney failure.  Managing any other conditions that are causing your CKD or making it worse.  Follow these instructions at home:  Medicines    Take over-the-counter and prescription medicines only as told by your health care provider. The amount of some medicines that you take may need to be changed.  Do not take any new medicines unless approved by your health care provider. Many medicines can make kidney damage worse.  Do not take any vitamin and mineral supplements unless approved by your health care provider. Many nutritional supplements can make kidney damage worse.  Lifestyle      Do not use any products that contain nicotine or tobacco, such as cigarettes, e-cigarettes, and chewing tobacco. If you need help quitting, ask your health care provider.  If you drink alcohol:  Limit how much you use to:  0–1 drink a day for women who are not pregnant.  0–2 drinks a day for men.  Know how much alcohol is in your drink. In the [place] one drink equals one 12 oz bottle of beer (355 mL), one 5 oz glass of wine (148 mL), or one 1½ oz glass of hard liquor (44 mL).  Maintain a healthy weight. If you need help, ask your health care provider.  General instructions      Follow instructions from your health care provider about eating or drinking restrictions, including any prescribed diet.  Track your blood pressure at home. Report changes in your blood pressure as told.  If you are being treated for diabetes, track your blood glucose levels as told.  Start or continue an exercise plan. Exercise at least 30 minutes a day, 5 days a week.  Keep your immunizations up to date as told.  Keep all follow-up visits. This is important.  Where to find more information  American Association of Kidney Patients: www.aakp.org  National Kidney Foundation: www.kidney.org  American Kidney Fund: www.akfinc.org  Life Options: www.lifeoptions.org  Kidney School: www.kidneyschool.org  Contact a health care provider if:  Your symptoms get worse.  You develop new symptoms.  Get help right away if:  You develop symptoms of ESRD. These include:  Headaches.  Numbness in your hands or feet.  Easy bruising.  Frequent hiccups.  Chest pain.  Shortness of breath.  Lack of menstrual periods, in women.  You have a fever.  You are producing less urine than usual.  You have pain or bleeding when you urinate or when you have a bowel movement.  These symptoms may represent a serious problem that is an emergency. Do not wait to see if the symptoms will go away. Get medical help right away. Call your local emergency services (911 in the [place] Do not drive yourself to the hospital.    Summary  Chronic kidney disease (CKD) occurs when the kidneys become damaged slowly over a long period of time.  The most common causes of this condition are diabetes and high blood pressure (hypertension).  There is no cure for most cases of CKD, but treatment usually relieves symptoms and prevents or slows the worsening of the disease. Treatment may include a combination of lifestyle changes, medicines, and dialysis.  This information is not intended to replace advice given to you by your health care provider. Make sure you discuss any questions you have with your health care provider.    Document Revised: 03/24/2021 Document Reviewed: 03/24/2021  [employer] Patient Education © 2023 [employer].

## 2023-04-23 NOTE — ED PROVIDER NOTE - CLINICAL SUMMARY MEDICAL DECISION MAKING FREE TEXT BOX
63-year-old man, history of diabetes, prior MI, pancreatitis, kidney disease, currently being weaned off of dialysis in conjunction with management advised by his nephrologist, was recently treated with baclofen for hiccups starting Thursday, April 20, 2023.  The next day after his evening dose, he started to feel very anxious and was continuously pacing around according to his son.  At that time he stopped taking the baclofen.  He was later told by his doctor to resume the baclofen and take 1 more dose, which she did and the pacing and anxiety sensation resolved.  However he is primarily concerned that he has not been able to get any sleep at all since Thursday night--Pt appears comfortable and well-appearing on presentation and is AAO x 3--will check labs, EKG, CXR, and reassess.

## 2023-04-23 NOTE — ED ADULT NURSE NOTE - OBJECTIVE STATEMENT
axox3 ,nad , brought to ER by son with c/o unable to sleep x 3 days denies any suicidal  or homicidal thoughts' at present time . axox3 ,nad , brought to ER by son with c/o unable to sleep x 3 days denies any suicidal  or homicidal thoughts' at present time . R chest area Dialysis cath

## 2023-04-23 NOTE — ED PROVIDER NOTE - PROGRESS NOTE DETAILS
Pt has no symptoms currently.  ED w/u shows elevated Cr, above 3.  Most recently it was about 2-3 per Pt.  He wants to go home and f/u with his nephrologist tomorrow to schedule urgent dialysis.  Advised strict return precautions.

## 2023-04-23 NOTE — ED PROVIDER NOTE - PATIENT PORTAL LINK FT
You can access the FollowMyHealth Patient Portal offered by [facility] by registering at the following website: [email or web address]. By joining [employer]’s FollowMyHealth portal, you will also be able to view your health information using other applications (apps) compatible with our system.

## 2023-04-25 ENCOUNTER — EMERGENCY (EMERGENCY)
Facility: HOSPITAL | Age: 63
LOS: 1 days | Discharge: HOME | End: 2023-04-25
Attending: EMERGENCY MEDICINE
Payer: MEDICAID

## 2023-04-25 VITALS
RESPIRATION RATE: 19 BRPM | HEIGHT: 68 IN | DIASTOLIC BLOOD PRESSURE: 68 MMHG | OXYGEN SATURATION: 100 % | SYSTOLIC BLOOD PRESSURE: 112 MMHG | HEART RATE: 84 BPM | TEMPERATURE: 98 F | WEIGHT: 175.93 LBS

## 2023-04-25 LAB
ALBUMIN SERPL ELPH-MCNC: 4.3 G/DL — SIGNIFICANT CHANGE UP (ref 3.3–5)
ALP SERPL-CCNC: 80 U/L — SIGNIFICANT CHANGE UP (ref 40–120)
ALT FLD-CCNC: 13 U/L — SIGNIFICANT CHANGE UP (ref 10–45)
ANION GAP SERPL CALC-SCNC: 16 MMOL/L — SIGNIFICANT CHANGE UP (ref 5–17)
AST SERPL-CCNC: 28 U/L — SIGNIFICANT CHANGE UP (ref 10–40)
BASE EXCESS BLDV CALC-SCNC: 0.6 MMOL/L — SIGNIFICANT CHANGE UP (ref -2–3)
BASOPHILS # BLD AUTO: 0.05 K/UL — SIGNIFICANT CHANGE UP (ref 0–0.2)
BASOPHILS NFR BLD AUTO: 0.4 % — SIGNIFICANT CHANGE UP (ref 0–2)
BILIRUB SERPL-MCNC: 0.6 MG/DL — SIGNIFICANT CHANGE UP (ref 0.2–1.2)
BUN SERPL-MCNC: 100 MG/DL — HIGH (ref 7–23)
CA-I SERPL-SCNC: 1.23 MMOL/L — SIGNIFICANT CHANGE UP (ref 1.15–1.33)
CALCIUM SERPL-MCNC: 9.6 MG/DL — SIGNIFICANT CHANGE UP (ref 8.4–10.5)
CHLORIDE BLDV-SCNC: 99 MMOL/L — SIGNIFICANT CHANGE UP (ref 96–108)
CHLORIDE SERPL-SCNC: 96 MMOL/L — SIGNIFICANT CHANGE UP (ref 96–108)
CO2 BLDV-SCNC: 27 MMOL/L — HIGH (ref 22–26)
CO2 SERPL-SCNC: 24 MMOL/L — SIGNIFICANT CHANGE UP (ref 22–31)
CREAT SERPL-MCNC: 3.11 MG/DL — HIGH (ref 0.5–1.3)
EGFR: 22 ML/MIN/1.73M2 — LOW
EOSINOPHIL # BLD AUTO: 0.24 K/UL — SIGNIFICANT CHANGE UP (ref 0–0.5)
EOSINOPHIL NFR BLD AUTO: 2.1 % — SIGNIFICANT CHANGE UP (ref 0–6)
GAS PNL BLDV: 135 MMOL/L — LOW (ref 136–145)
GAS PNL BLDV: SIGNIFICANT CHANGE UP
GLUCOSE BLDV-MCNC: 188 MG/DL — HIGH (ref 70–99)
GLUCOSE SERPL-MCNC: 192 MG/DL — HIGH (ref 70–99)
HCO3 BLDV-SCNC: 26 MMOL/L — SIGNIFICANT CHANGE UP (ref 22–29)
HCT VFR BLD CALC: 39.7 % — SIGNIFICANT CHANGE UP (ref 39–50)
HCT VFR BLDA CALC: 38 % — LOW (ref 39–51)
HGB BLD CALC-MCNC: 12.5 G/DL — LOW (ref 12.6–17.4)
HGB BLD-MCNC: 12.6 G/DL — LOW (ref 13–17)
IMM GRANULOCYTES NFR BLD AUTO: 0.6 % — SIGNIFICANT CHANGE UP (ref 0–0.9)
LACTATE BLDV-MCNC: 1.7 MMOL/L — SIGNIFICANT CHANGE UP (ref 0.5–2)
LYMPHOCYTES # BLD AUTO: 19.9 % — SIGNIFICANT CHANGE UP (ref 13–44)
LYMPHOCYTES # BLD AUTO: 2.3 K/UL — SIGNIFICANT CHANGE UP (ref 1–3.3)
MCHC RBC-ENTMCNC: 28.6 PG — SIGNIFICANT CHANGE UP (ref 27–34)
MCHC RBC-ENTMCNC: 31.7 GM/DL — LOW (ref 32–36)
MCV RBC AUTO: 90 FL — SIGNIFICANT CHANGE UP (ref 80–100)
MONOCYTES # BLD AUTO: 1.14 K/UL — HIGH (ref 0–0.9)
MONOCYTES NFR BLD AUTO: 9.9 % — SIGNIFICANT CHANGE UP (ref 2–14)
NEUTROPHILS # BLD AUTO: 7.75 K/UL — HIGH (ref 1.8–7.4)
NEUTROPHILS NFR BLD AUTO: 67.1 % — SIGNIFICANT CHANGE UP (ref 43–77)
NRBC # BLD: 0 /100 WBCS — SIGNIFICANT CHANGE UP (ref 0–0)
PCO2 BLDV: 44 MMHG — SIGNIFICANT CHANGE UP (ref 42–55)
PH BLDV: 7.38 — SIGNIFICANT CHANGE UP (ref 7.32–7.43)
PLATELET # BLD AUTO: 175 K/UL — SIGNIFICANT CHANGE UP (ref 150–400)
PO2 BLDV: 37 MMHG — SIGNIFICANT CHANGE UP (ref 25–45)
POTASSIUM BLDV-SCNC: 3.4 MMOL/L — LOW (ref 3.5–5.1)
POTASSIUM SERPL-MCNC: 3.6 MMOL/L — SIGNIFICANT CHANGE UP (ref 3.5–5.3)
POTASSIUM SERPL-SCNC: 3.6 MMOL/L — SIGNIFICANT CHANGE UP (ref 3.5–5.3)
PROT SERPL-MCNC: 8.6 G/DL — HIGH (ref 6–8.3)
RBC # BLD: 4.41 M/UL — SIGNIFICANT CHANGE UP (ref 4.2–5.8)
RBC # FLD: 16.1 % — HIGH (ref 10.3–14.5)
SAO2 % BLDV: 55.9 % — LOW (ref 67–88)
SODIUM SERPL-SCNC: 136 MMOL/L — SIGNIFICANT CHANGE UP (ref 135–145)
WBC # BLD: 11.55 K/UL — HIGH (ref 3.8–10.5)
WBC # FLD AUTO: 11.55 K/UL — HIGH (ref 3.8–10.5)

## 2023-04-25 PROCEDURE — 71045 X-RAY EXAM CHEST 1 VIEW: CPT

## 2023-04-25 PROCEDURE — 82947 ASSAY GLUCOSE BLOOD QUANT: CPT

## 2023-04-25 PROCEDURE — 12001 RPR S/N/AX/GEN/TRNK 2.5CM/<: CPT

## 2023-04-25 PROCEDURE — 82803 BLOOD GASES ANY COMBINATION: CPT

## 2023-04-25 PROCEDURE — 82565 ASSAY OF CREATININE: CPT

## 2023-04-25 PROCEDURE — 99284 EMERGENCY DEPT VISIT MOD MDM: CPT | Mod: 25

## 2023-04-25 PROCEDURE — 82330 ASSAY OF CALCIUM: CPT

## 2023-04-25 PROCEDURE — 99283 EMERGENCY DEPT VISIT LOW MDM: CPT | Mod: 25

## 2023-04-25 PROCEDURE — 80053 COMPREHEN METABOLIC PANEL: CPT

## 2023-04-25 PROCEDURE — 85014 HEMATOCRIT: CPT

## 2023-04-25 PROCEDURE — 83605 ASSAY OF LACTIC ACID: CPT

## 2023-04-25 PROCEDURE — 84132 ASSAY OF SERUM POTASSIUM: CPT

## 2023-04-25 PROCEDURE — 71045 X-RAY EXAM CHEST 1 VIEW: CPT | Mod: 26

## 2023-04-25 PROCEDURE — 85025 COMPLETE CBC W/AUTO DIFF WBC: CPT

## 2023-04-25 PROCEDURE — 85018 HEMOGLOBIN: CPT

## 2023-04-25 PROCEDURE — 84295 ASSAY OF SERUM SODIUM: CPT

## 2023-04-25 PROCEDURE — 82435 ASSAY OF BLOOD CHLORIDE: CPT

## 2023-04-25 NOTE — ED PROVIDER NOTE - PROGRESS NOTE DETAILS
Patient well-appearing, chest x-ray with no remnant foreign bodies in the chest, no bleeding and pain is well controlled.  1 stitch was placed into the right upper chest and patient was given return instructions.  We will follow-up with primary team outpatient.

## 2023-04-25 NOTE — ED PROVIDER NOTE - ATTENDING CONTRIBUTION TO CARE
62 y/o male with recent hiccups went to [facility] started on baclofen with psych reaction which was stopped. hiccups. pt with recent end organ failure in nov on ecmo lead to esrd on HD now improving last hd last thur, renal dr proctor in . pt permacath fell out today but was going to be removed on thur, vascular dr rivas scheduled for removal on thur. 62 y/o male with recent hiccups went to [facility] started on baclofen with psych reaction which was stopped. hiccups. pt with recent end organ failure in nov on ecmo lead to esrd on HD now improving last hd last thur, renal dr proctor in . pt permacath fell out today but was going to be removed on thur, vascular dr rivas scheduled for removal on thur. 62 y/o male with recent hiccups went to [facility] started on baclofen with psych reaction which was stopped. hiccups. pt with recent end organ failure in nov on ecmo lead to esrd on HD now improving last hd last thur, renal dr proctor in . pt permacath fell out today but was going to be removed on thur, vascular dr rivas scheduled for removal on thur.

## 2023-04-25 NOTE — ED PROVIDER NOTE - RAPID ASSESSMENT
pt is a 62 y/o male with recent hiccups went to [facility] started on baclofen with psych reaction which was stopped. hiccups. pt with recent end organ failure in nov on ecmo lead to esrd on HD now improving last hd last thur, renal dr proctor in . pt permacath fell out today but was going to be removed on thur, vascular dr rivas scheduled for removal on thur.     *** I, Rajan Diaz MD, performed an initial face to face bedside interview with this patient regarding history of present illness and determined that the patient should be evaluated in the main ED. A physical exam was not performed due to private space availability. This patient's evaluation is NOT COMPLETE and only preliminary. The full assessment, management, and reassessment of this patient, including but not limited to the follow up of ordered laboratory and radiologic testing, is deferred to the main ED provider. *** pt is a 62 y/o male with recent hiccups went to [facility] started on baclofen with psych reaction which was stopped. hiccups. pt with recent end organ failure in nov on ecmo lead to esrd on HD now improving last hd last thur, renal dr proctor in . pt permacath fell out today but was going to be removed on thur, vascular dr rivas scheduled for removal on thur.     *** I, Rajan Diaz MD, performed an initial face to face bedside interview with this patient regarding history of present illness and determined that the patient should be evaluated in the main ED. A physical exam was not performed due to private space availability. This patient's evaluation is NOT COMPLETE and only preliminary. The full assessment, management, and reassessment of this patient, including but not limited to the follow up of ordered laboratory and radiologic testing, is deferred to the main ED provider. *** pt is a 62 y/o male with recent hiccups went to [facility] started on baclofen with psych reaction which was stopped. hiccups. pt with recent end organ failure in nov on ecmo lead to esrd on HD now improving last hd last thur, renal dr proctor in . pt permacath fell out today but was going to be removed on thur, vascular dr rivas scheduled for removal on thur.     *** I, Rajan Diaz MD, performed an initial face to face bedside interview with this patient regarding history of present illness and determined that the patient should be evaluated in the main ED. A physical exam was not performed due to private space availability. This patient's evaluation is NOT COMPLETE and only preliminary. The full assessment, management, and reassessment of this patient, including but not limited to the follow up of ordered laboratory and radiologic testing, is deferred to the main ED provider. ***

## 2023-04-25 NOTE — ED PROVIDER NOTE - PHYSICAL EXAMINATION
GENERAL: non-toxic appearing, alert, in NAD  HEENT: atraumatic, normocephalic, Vision grossly intact, no conjunctivitis or discharge, hearing grossly intact,  no nasal discharge, epistaxis   CARDIAC: RRR, normal S1S2,  no appreciable murmurs, no cyanosis, cap refill < 2 seconds  PULM: normal work of breathing, oxygen saturation on RA wnl, CTAB, no crackles, rales, rhonchi, or wheezing  GI: abdomen nondistended, soft, nontender, no guarding or rebound tenderness, no palpable masses  NEURO: awake and alert, follows commands, normal speech, PERRLA, EOMI, no focal motor or sensory deficits  MSK: spine appears normal, no joint swelling or erythema, ranging all extremities with no appreciable loss of ROM  EXT: no peripheral edema, calf tenderness, redness or swelling  SKIN: 1cm puncture wound in R upper chest, hemostatic with no surrounding erythema, skin otherwise warm, dry, and intact, no rashes  PSYCH: appropriate mood and affect

## 2023-04-25 NOTE — ED PROVIDER NOTE - PATIENT PORTAL LINK FT
You can access the FollowMyHealth Patient Portal offered by [facility] by registering at the following website: [email or web address]. By joining [employer]’s FollowMyHealth portal, you will also be able to view your health information using other applications (apps) compatible with our system. You can access the FollowMyHealth Patient Portal offered by [facility] by registering at the following website: [email or web address]. By joining [employer]’s FollowMyHealth portal, you will also be able to view your health information using other applications (apps) compatible with our system. You can access the FollowMyHealth Patient Portal offered by [facility] by registering at the following website: [email or web address]. By joining [employer]’s FollowMyHealth portal, you will also be able to view your health information using other applications (apps) compatible with our system.

## 2023-04-25 NOTE — ED PROVIDER NOTE - NSFOLLOWUPINSTRUCTIONS_ED_ALL_ED_FT
Your evaluated today for a dislodged permacatheter from the right chest    Your work-up included a chest x-ray which shows no complications from the catheter only mild amount of fluid on the lungs    Continue evaluating for signs of infection as discussed and follow-up with your primary care doctor    Please return to the Emergency Department should you experience any of the following:  - Chest pain, Palpitations, Painful breathing  - Worsening or persistent shortness of breath  - Fever, unexplained weight loss, night sweats  - Blood in stool or in urine  - New weakness, fatigue, or fainting  - Any new concerning symptoms

## 2023-04-25 NOTE — ED ADULT TRIAGE NOTE - BEFAST SCREENING
What Type Of Note Output Would You Prefer (Optional)?: Bullet Format What Is The Reason For Today's Visit?: Full Body Skin Examination What Is The Reason For Today's Visit? (Being Monitored For X): the re-examination of lesions previously examined How Severe Are Your Spot(S)?: mild Additional History: Fbse Negative

## 2023-04-25 NOTE — ED ADULT TRIAGE NOTE - CHIEF COMPLAINT QUOTE
Permacath fell out today prior to arrival to ED. R side upper arm fistula in placed.  Denies pain, disomfort, shortness of breath. Permacath fell out today prior to arrival to ED. R side upper arm fistula in place.  Denies pain, disomfort, shortness of breath.

## 2023-04-25 NOTE — ED PROVIDER NOTE - OBJECTIVE STATEMENT
63-year-old male presents emergency department after right chest permacath fell out today spontaneously followed by bleeding which resolved with mild pressure.  This happened this afternoon, no bleeding since that time.  Denies any chest pain or shortness of breath, no recent fevers or chills.  Is now using matured AV fistula tract for dialysis.  Makes urine, ROS otherwise negative.  No other complaints

## 2023-04-26 NOTE — ED ADULT NURSE NOTE - OBJECTIVE STATEMENT
64 y/o male A&OX4, came to the ED after his Permacath fell out today, as per patient cath was going to be removed on Thursday. No bleeding, denies pain and discomfort. 62 y/o male A&OX4, came to the ED after his Permacath fell out today, as per patient cath was going to be removed on Thursday. No bleeding, denies pain and discomfort.

## 2023-04-26 NOTE — ED PROCEDURE NOTE - ATTENDING CONTRIBUTION TO CARE
I have participated in and supervised all key portions of the above procedures and agree with the above documentation. JENNIFER Andrews MD

## 2023-04-26 NOTE — ED ADULT NURSE NOTE - CHIEF COMPLAINT QUOTE
Permacath fell out today prior to arrival to ED. R side upper arm fistula in place.  Denies pain, disomfort, shortness of breath.

## 2023-04-26 NOTE — ED ADULT NURSE NOTE - NSIMPLEMENTINTERV_GEN_ALL_ED
Implemented All Universal Safety Interventions:  [place] to call system. Call bell, personal items and telephone within reach. Instruct patient to call for assistance. Room bathroom lighting operational. Non-slip footwear when patient is off stretcher. Physically safe environment: no spills, clutter or unnecessary equipment. Stretcher in lowest position, wheels locked, appropriate side rails in place. Implemented All Universal Safety Interventions:  [place] to call system. Call bell, personal items and telephone within reach. Instruct patient to call for assistance. Room bathroom lighting operational. Non-slip footwear when patient is off stretcher. Physically safe environment: no spills, clutter or unnecessary equipment. Stretcher in lowest position, wheels locked, appropriate side rails in place. Implemented All Universal Safety Interventions:  [place] to call system. Call bell, personal items and telephone within reach. Instruct patient to call for assistance. Room bathroom lighting operational. Non-slip footwear when patient is off stretcher. Physically safe environment: no spills, clutter or unnecessary equipment. Stretcher in lowest position, wheels locked, appropriate side rails in place.

## 2023-05-04 RX ORDER — SEVELAMER CARBONATE 800 MG/1
800 TABLET, FILM COATED ORAL
Qty: 270 | Refills: 0 | Status: ACTIVE | COMMUNITY
Start: 1900-01-01 | End: 1900-01-01

## 2023-05-08 ENCOUNTER — RX RENEWAL (OUTPATIENT)
Age: 63
End: 2023-05-08

## 2023-05-08 RX ORDER — HYDRALAZINE HYDROCHLORIDE 10 MG/1
10 TABLET ORAL
Qty: 90 | Refills: 5 | Status: ACTIVE | COMMUNITY
Start: 2023-02-06 | End: 1900-01-01

## 2023-05-09 NOTE — PROGRESS NOTE ADULT - REASON FOR ADMISSION
Acute cholecystitis, gallstone pancreatitis Wartpeel Counseling:  I discussed with the patient the risks of Wartpeel including but not limited to erythema, scaling, itching, weeping, crusting, and pain.

## 2023-05-25 ENCOUNTER — APPOINTMENT (OUTPATIENT)
Dept: HEART FAILURE | Facility: CLINIC | Age: 63
End: 2023-05-25

## 2023-07-12 NOTE — PROCEDURE NOTE - NSALLENTEST_VASC_A_CORE
CARE PROVIDERS:  Administration: Carmella Shaffer  Administration: Austin Little  Administration: Tyler Molina  Admitting: Maikol Velasco  Attending: Maikol Velasco  Nurse: Rodney Reid  Nurse: Marilyn Starr  Nurse: Edith Post  Nurse: Danay Gloria  Nurse: Roseann Suero  Nurse: Casey Ruiz  Nurse: Gabe Mcnulty  Occupational Therapy: Surekha Kline  Override: Danay Gloria  Override: Rodney Reid  Physical Therapy: Curtis Osorio  Physical Therapy: Sarah Coulter  Physical Therapy: Claudia Pruitt  Primary Team: Saul Serrano  Primary Team: Rabia Chou  Primary Team: Jt Velasquez  Primary Team: Maggie Ponce  Primary Team: Rina Mars  Primary Team: Alonzo Mancera  Primary Team: Saul Lockhart  Team: ROMAN  Hospitalists, Team  UR// Supp. Assoc.: Donna Valenzuela  
Yes
Yes

## 2023-12-12 PROBLEM — I25.2 OLD MYOCARDIAL INFARCTION: Chronic | Status: ACTIVE | Noted: 2023-04-23

## 2023-12-12 PROBLEM — Z86.39 PERSONAL HISTORY OF OTHER ENDOCRINE, NUTRITIONAL AND METABOLIC DISEASE: Chronic | Status: ACTIVE | Noted: 2023-04-23

## 2023-12-12 PROBLEM — K80.20 CALCULUS OF GALLBLADDER WITHOUT CHOLECYSTITIS WITHOUT OBSTRUCTION: Chronic | Status: ACTIVE | Noted: 2023-04-23

## 2023-12-12 PROBLEM — Z87.448 PERSONAL HISTORY OF OTHER DISEASES OF URINARY SYSTEM: Chronic | Status: ACTIVE | Noted: 2023-04-23

## 2023-12-12 PROBLEM — Z87.19 PERSONAL HISTORY OF OTHER DISEASES OF THE DIGESTIVE SYSTEM: Chronic | Status: ACTIVE | Noted: 2023-04-23

## 2023-12-12 RX ORDER — CARVEDILOL PHOSPHATE 80 MG/1
1 CAPSULE, EXTENDED RELEASE ORAL
Qty: 0 | Refills: 0 | DISCHARGE

## 2023-12-12 RX ORDER — ASPIRIN/CALCIUM CARB/MAGNESIUM 324 MG
1 TABLET ORAL
Qty: 0 | Refills: 0 | DISCHARGE

## 2023-12-12 RX ORDER — RAMIPRIL 5 MG
1 CAPSULE ORAL
Qty: 0 | Refills: 0 | DISCHARGE

## 2023-12-12 RX ORDER — OMEPRAZOLE 10 MG/1
1 CAPSULE, DELAYED RELEASE ORAL
Qty: 0 | Refills: 0 | DISCHARGE

## 2023-12-12 RX ORDER — INDAPAMIDE 1.25 MG
1.5 TABLET ORAL
Qty: 0 | Refills: 0 | DISCHARGE

## 2023-12-12 RX ORDER — RIVAROXABAN 15 MG-20MG
1 KIT ORAL
Qty: 0 | Refills: 0 | DISCHARGE

## 2023-12-12 RX ORDER — INSULIN NPH HUM/REG INSULIN HM 70-30/ML
18 VIAL (ML) SUBCUTANEOUS
Qty: 0 | Refills: 0 | DISCHARGE

## 2023-12-12 RX ORDER — ROSUVASTATIN CALCIUM 5 MG/1
1 TABLET ORAL
Qty: 0 | Refills: 0 | DISCHARGE

## 2023-12-12 RX ORDER — INSULIN NPH HUM/REG INSULIN HM 70-30/ML
16 VIAL (ML) SUBCUTANEOUS
Qty: 0 | Refills: 0 | DISCHARGE

## 2023-12-12 RX ORDER — METFORMIN HYDROCHLORIDE 850 MG/1
1 TABLET ORAL
Qty: 0 | Refills: 0 | DISCHARGE

## 2023-12-20 NOTE — PROGRESS NOTE ADULT - ASSESSMENT
Patient called wanting to schedule a neuropsych appt, writer advised patient that she needs to obtain a referral from a [facility] neurologist, not her PCP   Pt with [facility]IC

## 2023-12-21 NOTE — CONSULT NOTE ADULT - RESPIRATORY DISTRESS OBSERVATION: RESTLESSNESS
None Detail Level: Generalized Render In Strict Bullet Format?: No Initiate Treatment: Plan\\nDo not need oral antibiotic at this time\\n\\nContinue Ketoconazole shampoo Rx for entire head, scalp, beard, neck, you have this\\nFor any persistent rash on face, neck, scalp\\nAM\\nKetoconazole Rx, new for you\\nPM\\nMometasone cream Rx, you have this\\nKetoconazole cream\\n\\nFor arms\\nAM\\nWash with Ketoconazole shampoo/soap\\nThen apply \\nClindamycin lotion\\n\\nPM\\nWash with \\nBenzoyl Peroxide 4 or 5 % wash such as Panoxyl Creamy Wash or CeraVe BP wash. OTC\\nThen apply\\nClindamycin lotion and then\\nMometasone, right arm only\\n\\nWill make appointment for before you leave in January but if doing well may cancel and see us in June

## 2024-02-12 NOTE — GOALS OF CARE CONVERSATION - ADVANCED CARE PLANNING - FAMILY/CHILD(REN)
Received request via: Pharmacy    Was the patient seen in the last year in this department? Yes    Does the patient have an active prescription (recently filled or refills available) for medication(s) requested? No    Pharmacy Name: [facility]    Does the patient have MCFP Plus and need 100 day supply (blood pressure, diabetes and cholesterol meds only)? Patient does not have SCP   son Paul and daughter Deng

## 2024-04-17 ENCOUNTER — APPOINTMENT (OUTPATIENT)
Age: 64
End: 2024-04-17

## 2024-04-25 NOTE — PROGRESS NOTE ADULT - PROBLEM SELECTOR PLAN 2
On medications,  no chest pain, stable, monitored and followed up by primary cardiothoracic team/cardiology team 4 = No assist / stand by assistance

## 2024-10-17 NOTE — PRE-ANESTHESIA EVALUATION ADULT - NSANTHDIETYNSD_GEN_ALL_CORE
Continue Flomax 0.4 mg daily   KUB in 6 months   Call with questions, comments, or concerns. I recommend going to the ED for further evaluation if you develop fever, chills, nausea, vomiting, chest pain, SOB, or calf pain.    
Yes

## 2025-02-27 NOTE — PATIENT PROFILE ADULT - ARE SIGNIFICANT INDICATORS COMPLETE.
Subjective:       Patient ID: Lilli Christianson is a 8 y.o. female.    Chief Complaint: Digeorge w SMCP    HPI Last seen 2/5/25 w dc AD . Cleaned and rx w zmax. Wets ear in tub. In for FU. Better.     Complex PMH w 22q11 and SMCP. Has perf AD + CHL AD. AD has drained x 3 since 11/2024. Has perf AD. Wets in bath. AS no perf and no issues. In for ck  today.     Had BAHA .  Not doing well with BAHA . Refused it at times. Does not like vibrations . Now fit standard aid AD and DW.     Poor artic . In danger of failing school for speech issues. Straight A's otherwise.    ABR- 6/3/2021     ABR                                     RIGHT EAR                     LEFT EAR  500Hz CE CHIRPS              55 dBHL                          20 dBHL  Broad Band CE CHIRPS     40 dBHL                          25 dBHL  2000Hz CE CHIRPS            45 dBHL                          30 dBHL  4000Hz CE CHIRPS            40 dBHL                          15 dBHL  Bone CE CHIRPS                10 dBHL                          10 dBHL     ASSR                                   RIGHT EAR                     LEFT EAR    500 Hz                                  45 dBHL                            5 dBHL  1000 Hz                                  50 dBHL                          15 dBHL  2000 Hz                                  35 dBHL                          15 dBHL  4000 Hz                                  35 dBHL                          15 dBHL       Review of Systems   Constitutional:  Negative for activity change, appetite change, fever and unexpected weight change.        DiGeorge syndrome   HENT:  Negative for nasal congestion, ear discharge, ear pain ( ), facial swelling, hearing loss, rhinorrhea, sore throat and trouble swallowing.         PE tubes  Submucous cleft palate  perf AD   Eyes:  Negative for discharge, redness and visual disturbance.   Respiratory: Negative.  Negative for cough, wheezing and stridor.    Cardiovascular: Negative.  Negative  for chest pain.        Negative for Congenital heart disease   Gastrointestinal: Negative.  Negative for diarrhea, nausea and vomiting.        Negative for GERD/PUD   Genitourinary:  Negative for enuresis.        Neg for congenital abn   Musculoskeletal:  Negative for joint swelling and myalgias.   Integumentary:  Negative for color change and rash. Negative.   Allergic/Immunologic:        IGG q wk for immune def w 22q11   Neurological:  Positive for speech difficulty (mild VPI). Negative for seizures, weakness and headaches.   Hematological:  Negative for adenopathy. Does not bruise/bleed easily.   Psychiatric/Behavioral:  Negative for behavioral problems. The patient is not hyperactive.          Objective:      Physical Exam  Constitutional:       General: She is active. She is not in acute distress.     Appearance: She is well-developed.      Comments: Dysmorphic; huge vocab ; R front lat distortions w other artic errors    HENT:      Head: Normocephalic.      Jaw: There is normal jaw occlusion.      Right Ear: External ear normal. Drainage (scant yellow dc suctioned) present. No middle ear effusion. Ear canal is occluded. There is impacted cerumen. Tympanic membrane is perforated (30%).      Left Ear: Tympanic membrane and external ear normal.  No middle ear effusion. Ear canal is occluded. There is impacted cerumen.      Ears:        Nose: No mucosal edema, congestion or rhinorrhea.      Mouth/Throat:      Mouth: Mucous membranes are moist.      Palate: Lesions (split uvula , remainder intact) present.      Pharynx: Oropharynx is clear.      Tonsils: 0 on the right. 0 on the left.   Eyes:      General:         Right eye: No discharge or erythema.         Left eye: No discharge or erythema.      No periorbital edema on the right side. No periorbital edema on the left side.      Extraocular Movements:      Right eye: Normal extraocular motion.      Left eye: Normal extraocular motion.      Pupils: Pupils are  equal, round, and reactive to light.   Cardiovascular:      Rate and Rhythm: Normal rate and regular rhythm.   Pulmonary:      Effort: Pulmonary effort is normal. No respiratory distress.      Breath sounds: Normal breath sounds. No wheezing.   Musculoskeletal:         General: Normal range of motion.      Cervical back: Normal range of motion.   Skin:     General: Skin is warm.      Findings: No rash.   Neurological:      Mental Status: She is alert.      Cranial Nerves: No cranial nerve deficit.      Comments: Artic errors                      Cerumen removal: Ears cleared under microscopic vision with curette, forceps and suction as necessary. Child appropriately restrained by parent or/and papoose board.     Assessment:       1. 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 Immune deficiency disorder    4. Perforated tympanic membrane, right    5. Otorrhea of right ear - improved    6. Conductive hearing loss of right ear with unrestricted hearing of left ear    7. Submucous cleft palate    8. Bilateral impacted cerumen    9. Perforation of right tympanic membrane    10. Articulation delay              Plan:       1. Dry ear precautions. Check perf q 6 months  2. Ear ck 6 wks . Will refer re T plasty now v puberty . Has SMCP. AS has been dry w/o perf for > yr  3. Sp eval here. Should not fail school for this . Straight A's . Needs sp rx.    Yes

## 2025-06-14 NOTE — CONSULT NOTE ADULT - SUBJECTIVE AND OBJECTIVE BOX
[facility] Neurology - Consult Note  -  Spectra: [phone] ([facility]), [phone] ([facility])  -  HPI: Per chart review: Patient EDUARDO REAL is a 62y (1960) man with a hx of CAD s/p 4v CABG ~2019 in [facility], DMII A1c 7.3%, HTN, HLD, who originally presented as a transfer from [facility] with concern for STEMI, had presented to [facility] with abdominal pain, n/v, was found to have pancreatitis and elevated troponins. CT abdomen revealed acute pancreatitis and his initial LVEF was 40-45%. He developed MSOF with hypoxic respiratory failure requiring intubation and ERIC and went into hypoxic PEA arrest requiring ACLS and due to persistent hypoxia and hypotension on pressors after ROSC was cann[facility] to [facility] on 11/25. Notably CTH that day revealed small subdural hemorrhage. His post arrest TTE on 11/26 showed a significantly worse LVEF of 5-10% with an AV that was only minimally opening.      Per consult received over the phone: Patient s/p fulminant cardiogenic shock had been placed on ECMO, now off of ECMO and on balloon pump, was on anticoagulation entire time. Had been noted to have a small SDH on 11/25. Patient was following commands. On 12/11/22, patient noted to have an acute change: no longer following any commands, not moving arms or legs b/l, not withdrawing to pain, does track with eyes, but not responding to questions or commands. Last seen at baseline by nurse at 10:45PM. Anticoagulation was held, NSGY was consulted, CTH was done STAT, patient was okayed by neurosurgery to continue back on anticoagulation, was placed back on a heparin drip. Neurology was consulted.  CTH noncontrast looks similar to prior, awaiting official read.   Labs: WBC 18. HGB 9.2. MCV 89. RDW 15. . INR 1.18. PT 13.7. PTT 30.7. Chance 134. BUN 25. Cr 1.55. Glu 100. Ca 8.3. Alb 2.8. AlkP 134. Amylase 206. Lipase 404. LDL 63. A1c 7.3.  . /45. RR 30. O2 100%. Temp 100.8 Patient on heparin drip, insulin, milrinone. Febrile, ID following, pt on vancomycin and cefepime.    TTE 12/10   1. Mitral annular calcification, otherwise normal mitral valve. Mild-moderate mitral regurgitation.  2. Normal left ventricular internal dimensions and wall thickness.  3. Severe global left ventricular systolic dysfunction. Endocardial visualization enhanced with intravenous  injection of Ultrasonic Enhancing Agent (Definity).  No LV thrombus seen.  4. The right ventricle is not well visualized.        Review of Systems:  INCOMPLETE   CONSTITUTIONAL: No fevers or chills  EYES AND ENT: No visual changes or no throat pain   NECK: No pain or stiffness  RESPIRATORY: No hemoptysis or shortness of breath  CARDIOVASCULAR: No chest pain or palpitations  GASTROINTESTINAL: No melena or hematochezia  GENITOURINARY: No dysuria or hematuria  NEUROLOGICAL: +As stated in HPI above  SKIN: No itching, burning, rashes, or lesions   All other review of systems is negative unless indicated above.    Allergies:      PMHx/PSHx/Family Hx: As above, otherwise see below       Social Hx:  No current use of tobacco, alcohol, or illicit drugs  Lives with ***    Medications:  MEDICATIONS  (STANDING):  acetaminophen   IVPB .. 1000 milliGRAM(s) IV Intermittent once  aMIOdarone    Tablet   Oral   aMIOdarone    Tablet 400 milliGRAM(s) Oral every 8 hours  atorvastatin 40 milliGRAM(s) Oral at bedtime  cefepime   IVPB 1000 milliGRAM(s) IV Intermittent every 8 hours  chlorhexidine 0.12% Liquid 15 milliLiter(s) Oral Mucosa every 12 hours  chlorhexidine 2% Cloths 1 Application(s) Topical <User Schedule>  CRRT Treatment    <Continuous>  dexMEDEtomidine Infusion 1 MICROgram(s)/kG/Hr (23.2 mL/Hr) IV Continuous <Continuous>  fluconAZOLE IVPB      fluconAZOLE IVPB 200 milliGRAM(s) IV Intermittent every 24 hours  heparin  Infusion 800 Unit(s)/Hr (9 mL/Hr) IV Continuous <Continuous>  insulin regular Infusion 3 Unit(s)/Hr (3 mL/Hr) IV Continuous <Continuous>  milrinone Infusion 0.1 MICROgram(s)/kG/Min (2.79 mL/Hr) IV Continuous <Continuous>  pantoprazole  Injectable 40 milliGRAM(s) IV Push two times a day  Phoxillum Filtration BK 4 / 2.5 5000 milliLiter(s) (1200 mL/Hr) CRRT <Continuous>  polyethylene glycol 3350 17 Gram(s) Oral two times a day  PrismaSATE Dialysate BK 0 / 3.5 5000 milliLiter(s) (2000 mL/Hr) CRRT <Continuous>  PrismaSOL Filtration BGK 4 / 2.5 5000 milliLiter(s) (200 mL/Hr) CRRT <Continuous>  senna 2 Tablet(s) Oral at bedtime  sodium chloride 0.9%. 1000 milliLiter(s) (10 mL/Hr) IV Continuous <Continuous>  vancomycin  IVPB 1000 milliGRAM(s) IV Intermittent every 12 hours  vasopressin Infusion 0.033 Unit(s)/Min (5 mL/Hr) IV Continuous <Continuous>    MEDICATIONS  (PRN):      Vitals:  T(C): 38.2 (12-12-22 @ 01:45), Max: 38.2 (12-12-22 @ 01:45)  HR: 118 (12-12-22 @ 01:45) (75 - 170)  BP: --  RR: 25 (12-12-22 @ 01:45) (8 - 32)  SpO2: 100% (12-12-22 @ 01:45) (96% - 100%)    Physical Examination: INCOMPLETE  General - NAD  Cardiovascular - Peripheral pulses palpable, no edema  Eyes - Fundoscopy with flat, sharp optic discs and no hemorrhage or exudates; Fundoscopy not well visualized; Fundoscopy not performed due to safety precautions in the setting of the COVID-19 pandemic    Neurologic Exam:  Mental status - Awake, Alert, Oriented to person, place, and time. Speech fluent, repetition and naming intact. Follows simple and complex commands. Attention/concentration, recent and remote memory (including registration and recall), and fund of knowledge intact    Cranial nerves - PERRLA, VFF, EOMI, face sensation (V1-V3) intact b/l, facial strength intact without asymmetry b/l, hearing intact b/l, palate with symmetric elevation, trapezius OR sternocleidomastiod 5/5 strength b/l, tongue midline on protrusion with full lateral movement    Motor - Normal bulk and tone throughout. No pronator drift.  Strength testing            Deltoid      Biceps      Triceps     Wrist Extension    Wrist Flexion     Interossei         R            5                 5               5                     5                              5                        5                 5  L             5                 5               5                     5                              5                        5                 5              Hip Flexion    Hip Extension    Knee Flexion    Knee Extension    Dorsiflexion    Plantar Flexion  R              5                           5                       5                           5                            5                          5  L              5                           5                        5                           5                            5                          5    Sensation - Light touch/temperature OR pain/vibration intact throughout    DTR's -             Biceps      Triceps     Brachioradialis      Patellar    Ankle    Toes/plantar response  R             2+             2+                  2+                       2+            2+                 Down  L              2+             2+                 2+                        2+           2+                 Down    Coordination - Finger to Nose intact b/l. No tremors appreciated    Gait and station - Normal casual gait. Romberg (-)    Labs:                        9.2    18.07 )-----------( 142      ( 12 Dec 2022 00:41 )             28.2     12-12    134<L>  |  99  |  25<H>  ----------------------------<  100<H>  3.8   |  20<L>  |  1.55<H>    Ca    8.3<L>      12 Dec 2022 00:41  Phos  3.4     12-12  Mg     2.1     12-12    TPro  6.2  /  Alb  2.8<L>  /  TBili  1.2  /  DBili  x   /  AST  44<H>  /  ALT  18  /  AlkPhos  134<H>  12-12    CAPILLARY BLOOD GLUCOSE  102 (12 Dec 2022 02:00)      POCT Blood Glucose.: 102 mg/dL (12 Dec 2022 01:49)    LIVER FUNCTIONS - ( 12 Dec 2022 00:41 )  Alb: 2.8 g/dL / Pro: 6.2 g/dL / ALK PHOS: 134 U/L / ALT: 18 U/L / AST: 44 U/L / GGT: x             Culture - Blood (collected 10 Dec 2022 15:45)  Source: .Blood Blood-Peripheral  Preliminary Report (11 Dec 2022 22:02):    No growth to date.    Culture - Blood (collected 10 Dec 2022 15:30)  Source: .Blood Blood-Peripheral  Preliminary Report (11 Dec 2022 22:02):    No growth to date.    Culture - Sputum (collected 10 Dec 2022 10:49)  Source: ET Tube ET Tube  Gram Stain (10 Dec 2022 23:07):    Moderate polymorphonuclear leukocytes per low power field    No Squamous epithelial cells per low power field    Few Yeast like cells per oil power field  Preliminary Report (11 Dec 2022 18:53):    Normal Respiratory Christy present    Culture - Sputum (collected 09 Dec 2022 04:58)  Source: ET Tube ET Tube  Gram Stain (09 Dec 2022 15:01):    Numerous polymorphonuclear leukocytes per low power field    Moderate Squamous epithelial cells per low power field    Few Yeast like cells    per oil power field  Final Report (11 Dec 2022 14:03):    Normal Respiratory Christy present      PT/INR - ( 12 Dec 2022 00:41 )   PT: 13.7 sec;   INR: 1.18 ratio         PTT - ( 12 Dec 2022 00:41 )  PTT:30.7 sec  CSF:                  Radiology:  CT Head No Cont:  (06 Dec 2022 13:02)     Neurology - Consult Note  -  Spectra: [phone] ([facility]), [phone] ([facility])  -  HPI: Per chart review: Patient EDUARDO REAL is a 62y (1960) man with a hx of CAD s/p 4v CABG ~2019 in [facility], DMII A1c 7.3%, HTN, HLD, who originally presented as a transfer from [facility] with concern for STEMI, had presented to [facility] with abdominal pain, n/v, was found to have pancreatitis and elevated troponins. CT abdomen revealed acute pancreatitis and his initial LVEF was 40-45%. He developed MSOF with hypoxic respiratory failure requiring intubation and ERIC and went into hypoxic PEA arrest requiring ACLS and due to persistent hypoxia and hypotension on pressors after ROSC was cannulated to [facility]O on 11/25. Notably CTH that day revealed small subdural hemorrhage. His post arrest TTE on 11/26 showed a significantly worse LVEF of 5-10% with an AV that was only minimally opening.      Per consult received over the phone: Patient s/p fulminant cardiogenic shock had been placed on ECMO, now off of ECMO and on balloon pump, was on anticoagulation entire time. Had been noted to have a small SDH on 11/25. Patient was following commands. On 12/11/22, patient noted to have an acute change: no longer following any commands, not moving arms or legs b/l, not withdrawing to pain, does track with eyes, but not responding to questions or commands. Last seen at baseline by nurse at 10:45PM. Anticoagulation was held, NSGY was consulted, CTH was done STAT, patient was okayed by neurosurgery to continue back on anticoagulation, was placed back on a heparin drip. Neurology was consulted.  CTH noncontrast looks similar to prior, awaiting official read.   Labs: WBC 18. HGB 9.2. MCV 89. RDW 15. . INR 1.18. PT 13.7. PTT 30.7. Chance 134. BUN 25. Cr 1.55. Glu 100. Ca 8.3. Alb 2.8. AlkP 134. Amylase 206. Lipase 404. LDL 63. A1c 7.3.  . /45. RR 30. O2 100%. Temp 100.8 Patient on heparin drip, insulin, milrinone. Febrile, ID following, pt on vancomycin and cefepime.    TTE 12/10   1. Mitral annular calcification, otherwise normal mitral valve. Mild-moderate mitral regurgitation.  2. Normal left ventricular internal dimensions and wall thickness.  3. Severe global left ventricular systolic dysfunction. Endocardial visualization enhanced with intravenous  injection of Ultrasonic Enhancing Agent (Definity).  No LV thrombus seen.  4. The right ventricle is not well visualized.        Review of Systems:  INCOMPLETE   CONSTITUTIONAL: No fevers or chills  EYES AND ENT: No visual changes or no throat pain   NECK: No pain or stiffness  RESPIRATORY: No hemoptysis or shortness of breath  CARDIOVASCULAR: No chest pain or palpitations  GASTROINTESTINAL: No melena or hematochezia  GENITOURINARY: No dysuria or hematuria  NEUROLOGICAL: +As stated in HPI above  SKIN: No itching, burning, rashes, or lesions   All other review of systems is negative unless indicated above.    Allergies:      PMHx/PSHx/Family Hx: As above, otherwise see below       Social Hx:  No current use of tobacco, alcohol, or illicit drugs  Lives with ***    Medications:  MEDICATIONS  (STANDING):  acetaminophen   IVPB .. 1000 milliGRAM(s) IV Intermittent once  aMIOdarone    Tablet   Oral   aMIOdarone    Tablet 400 milliGRAM(s) Oral every 8 hours  atorvastatin 40 milliGRAM(s) Oral at bedtime  cefepime   IVPB 1000 milliGRAM(s) IV Intermittent every 8 hours  chlorhexidine 0.12% Liquid 15 milliLiter(s) Oral Mucosa every 12 hours  chlorhexidine 2% Cloths 1 Application(s) Topical <User Schedule>  CRRT Treatment    <Continuous>  dexMEDEtomidine Infusion 1 MICROgram(s)/kG/Hr (23.2 mL/Hr) IV Continuous <Continuous>  fluconAZOLE IVPB      fluconAZOLE IVPB 200 milliGRAM(s) IV Intermittent every 24 hours  heparin  Infusion 800 Unit(s)/Hr (9 mL/Hr) IV Continuous <Continuous>  insulin regular Infusion 3 Unit(s)/Hr (3 mL/Hr) IV Continuous <Continuous>  milrinone Infusion 0.1 MICROgram(s)/kG/Min (2.79 mL/Hr) IV Continuous <Continuous>  pantoprazole  Injectable 40 milliGRAM(s) IV Push two times a day  Phoxillum Filtration BK 4 / 2.5 5000 milliLiter(s) (1200 mL/Hr) CRRT <Continuous>  polyethylene glycol 3350 17 Gram(s) Oral two times a day  PrismaSATE Dialysate BK 0 / 3.5 5000 milliLiter(s) (2000 mL/Hr) CRRT <Continuous>  PrismaSOL Filtration BGK 4 / 2.5 5000 milliLiter(s) (200 mL/Hr) CRRT <Continuous>  senna 2 Tablet(s) Oral at bedtime  sodium chloride 0.9%. 1000 milliLiter(s) (10 mL/Hr) IV Continuous <Continuous>  vancomycin  IVPB 1000 milliGRAM(s) IV Intermittent every 12 hours  vasopressin Infusion 0.033 Unit(s)/Min (5 mL/Hr) IV Continuous <Continuous>    MEDICATIONS  (PRN):      Vitals:  T(C): 38.2 (12-12-22 @ 01:45), Max: 38.2 (12-12-22 @ 01:45)  HR: 118 (12-12-22 @ 01:45) (75 - 170)  BP: --  RR: 25 (12-12-22 @ 01:45) (8 - 32)  SpO2: 100% (12-12-22 @ 01:45) (96% - 100%)    Physical Examination: INCOMPLETE  General - NAD  Cardiovascular - Peripheral pulses palpable, no edema  Eyes - Fundoscopy with flat, sharp optic discs and no hemorrhage or exudates; Fundoscopy not well visualized; Fundoscopy not performed due to safety precautions in the setting of the COVID-19 pandemic    Neurologic Exam:  Mental status - Awake, Alert, Oriented to person, place, and time. Speech fluent, repetition and naming intact. Follows simple and complex commands. Attention/concentration, recent and remote memory (including registration and recall), and fund of knowledge intact    Cranial nerves - PERRLA, VFF, EOMI, face sensation (V1-V3) intact b/l, facial strength intact without asymmetry b/l, hearing intact b/l, palate with symmetric elevation, trapezius OR sternocleidomastiod 5/5 strength b/l, tongue midline on protrusion with full lateral movement    Motor - Normal bulk and tone throughout. No pronator drift.  Strength testing            Deltoid      Biceps      Triceps     Wrist Extension    Wrist Flexion     Interossei         R            5                 5               5                     5                              5                        5                 5  L             5                 5               5                     5                              5                        5                 5              Hip Flexion    Hip Extension    Knee Flexion    Knee Extension    Dorsiflexion    Plantar Flexion  R              5                           5                       5                           5                            5                          5  L              5                           5                        5                           5                            5                          5    Sensation - Light touch/temperature OR pain/vibration intact throughout    DTR's -             Biceps      Triceps     Brachioradialis      Patellar    Ankle    Toes/plantar response  R             2+             2+                  2+                       2+            2+                 Down  L              2+             2+                 2+                        2+           2+                 Down    Coordination - Finger to Nose intact b/l. No tremors appreciated    Gait and station - Normal casual gait. Romberg (-)    Labs:                        9.2    18.07 )-----------( 142      ( 12 Dec 2022 00:41 )             28.2     12-12    134<L>  |  99  |  25<H>  ----------------------------<  100<H>  3.8   |  20<L>  |  1.55<H>    Ca    8.3<L>      12 Dec 2022 00:41  Phos  3.4     12-12  Mg     2.1     12-12    TPro  6.2  /  Alb  2.8<L>  /  TBili  1.2  /  DBili  x   /  AST  44<H>  /  ALT  18  /  AlkPhos  134<H>  12-12    CAPILLARY BLOOD GLUCOSE  102 (12 Dec 2022 02:00)      POCT Blood Glucose.: 102 mg/dL (12 Dec 2022 01:49)    LIVER FUNCTIONS - ( 12 Dec 2022 00:41 )  Alb: 2.8 g/dL / Pro: 6.2 g/dL / ALK PHOS: 134 U/L / ALT: 18 U/L / AST: 44 U/L / GGT: x             Culture - Blood (collected 10 Dec 2022 15:45)  Source: .Blood Blood-Peripheral  Preliminary Report (11 Dec 2022 22:02):    No growth to date.    Culture - Blood (collected 10 Dec 2022 15:30)  Source: .Blood Blood-Peripheral  Preliminary Report (11 Dec 2022 22:02):    No growth to date.    Culture - Sputum (collected 10 Dec 2022 10:49)  Source: ET Tube ET Tube  Gram Stain (10 Dec 2022 23:07):    Moderate polymorphonuclear leukocytes per low power field    No Squamous epithelial cells per low power field    Few Yeast like cells per oil power field  Preliminary Report (11 Dec 2022 18:53):    Normal Respiratory Christy present    Culture - Sputum (collected 09 Dec 2022 04:58)  Source: ET Tube ET Tube  Gram Stain (09 Dec 2022 15:01):    Numerous polymorphonuclear leukocytes per low power field    Moderate Squamous epithelial cells per low power field    Few Yeast like cells    per oil power field  Final Report (11 Dec 2022 14:03):    Normal Respiratory Christy present      PT/INR - ( 12 Dec 2022 00:41 )   PT: 13.7 sec;   INR: 1.18 ratio         PTT - ( 12 Dec 2022 00:41 )  PTT:30.7 sec  CSF:                  Radiology:  CT Head No Cont:  (06 Dec 2022 13:02)     Neurology - Consult Note  -  Spectra: [phone] ([facility]), [phone] ([facility])  -  HPI: Per chart review: Patient EDUARDO REAL is a 62y (1960) man with a hx of CAD s/p 4v CABG ~2019 in [facility], DMII A1c 7.3%, HTN, HLD, who originally presented as a transfer from [facility] with concern for STEMI, had presented to [facility] with abdominal pain, n/v, was found to have pancreatitis and elevated troponins. CT abdomen revealed acute pancreatitis and his initial LVEF was 40-45%. He developed MSOF with hypoxic respiratory failure requiring intubation and ERIC and went into hypoxic PEA arrest requiring ACLS and due to persistent hypoxia and hypotension on pressors after ROSC was cannulated to [facility] ECMO on 11/25. Notably CTH that day revealed small subdural hemorrhage. His post arrest TTE on 11/26 showed a significantly worse LVEF of 5-10% with an AV that was only minimally opening.      Per consult received over the phone: Patient s/p fulminant cardiogenic shock had been placed on ECMO, now off of ECMO and on balloon pump, was on anticoagulation entire time. Had been noted to have a small SDH on 11/25. Patient was following commands. On 12/11/22, patient noted to have an acute change: no longer following any commands, not moving arms or legs b/l, not withdrawing to pain, does track with eyes, but not responding to questions or commands. Last seen at baseline by nurse at 10:45PM. Anticoagulation was held, NSGY was consulted, CTH was done STAT, patient was okayed by neurosurgery to continue back on anticoagulation, was placed back on a heparin drip. Neurology was consulted.  CTH noncontrast looks similar to prior, awaiting official read.   Labs: WBC 18. HGB 9.2. MCV 89. RDW 15. . INR 1.18. PT 13.7. PTT 30.7. Chance 134. BUN 25. Cr 1.55. Glu 100. Ca 8.3. Alb 2.8. AlkP 134. Amylase 206. Lipase 404. LDL 63. A1c 7.3.  . /45. RR 30. O2 100%. Temp 100.8 Patient on heparin drip, insulin, milrinone. Febrile, ID following, pt on vancomycin and cefepime.    TTE 12/10   1. Mitral annular calcification, otherwise normal mitral valve. Mild-moderate mitral regurgitation.  2. Normal left ventricular internal dimensions and wall thickness.  3. Severe global left ventricular systolic dysfunction. Endocardial visualization enhanced with intravenous  injection of Ultrasonic Enhancing Agent (Definity).  No LV thrombus seen.  4. The right ventricle is not well visualized.        Review of Systems:  INCOMPLETE   CONSTITUTIONAL: No fevers or chills  EYES AND ENT: No visual changes or no throat pain   NECK: No pain or stiffness  RESPIRATORY: No hemoptysis or shortness of breath  CARDIOVASCULAR: No chest pain or palpitations  GASTROINTESTINAL: No melena or hematochezia  GENITOURINARY: No dysuria or hematuria  NEUROLOGICAL: +As stated in HPI above  SKIN: No itching, burning, rashes, or lesions   All other review of systems is negative unless indicated above.    Allergies:      PMHx/PSHx/Family Hx: As above, otherwise see below       Social Hx:  No current use of tobacco, alcohol, or illicit drugs  Lives with ***    Medications:  MEDICATIONS  (STANDING):  acetaminophen   IVPB .. 1000 milliGRAM(s) IV Intermittent once  aMIOdarone    Tablet   Oral   aMIOdarone    Tablet 400 milliGRAM(s) Oral every 8 hours  atorvastatin 40 milliGRAM(s) Oral at bedtime  cefepime   IVPB 1000 milliGRAM(s) IV Intermittent every 8 hours  chlorhexidine 0.12% Liquid 15 milliLiter(s) Oral Mucosa every 12 hours  chlorhexidine 2% Cloths 1 Application(s) Topical <User Schedule>  CRRT Treatment    <Continuous>  dexMEDEtomidine Infusion 1 MICROgram(s)/kG/Hr (23.2 mL/Hr) IV Continuous <Continuous>  fluconAZOLE IVPB      fluconAZOLE IVPB 200 milliGRAM(s) IV Intermittent every 24 hours  heparin  Infusion 800 Unit(s)/Hr (9 mL/Hr) IV Continuous <Continuous>  insulin regular Infusion 3 Unit(s)/Hr (3 mL/Hr) IV Continuous <Continuous>  milrinone Infusion 0.1 MICROgram(s)/kG/Min (2.79 mL/Hr) IV Continuous <Continuous>  pantoprazole  Injectable 40 milliGRAM(s) IV Push two times a day  Phoxillum Filtration BK 4 / 2.5 5000 milliLiter(s) (1200 mL/Hr) CRRT <Continuous>  polyethylene glycol 3350 17 Gram(s) Oral two times a day  PrismaSATE Dialysate BK 0 / 3.5 5000 milliLiter(s) (2000 mL/Hr) CRRT <Continuous>  PrismaSOL Filtration BGK 4 / 2.5 5000 milliLiter(s) (200 mL/Hr) CRRT <Continuous>  senna 2 Tablet(s) Oral at bedtime  sodium chloride 0.9%. 1000 milliLiter(s) (10 mL/Hr) IV Continuous <Continuous>  vancomycin  IVPB 1000 milliGRAM(s) IV Intermittent every 12 hours  vasopressin Infusion 0.033 Unit(s)/Min (5 mL/Hr) IV Continuous <Continuous>    MEDICATIONS  (PRN):      Vitals:  T(C): 38.2 (12-12-22 @ 01:45), Max: 38.2 (12-12-22 @ 01:45)  HR: 118 (12-12-22 @ 01:45) (75 - 170)  BP: --  RR: 25 (12-12-22 @ 01:45) (8 - 32)  SpO2: 100% (12-12-22 @ 01:45) (96% - 100%)    Physical Examination: INCOMPLETE  General - NAD  Cardiovascular - Peripheral pulses palpable, no edema  Eyes - Fundoscopy with flat, sharp optic discs and no hemorrhage or exudates; Fundoscopy not well visualized; Fundoscopy not performed due to safety precautions in the setting of the COVID-19 pandemic    Neurologic Exam:  Mental status - Awake, Alert, Oriented to person, place, and time. Speech fluent, repetition and naming intact. Follows simple and complex commands. Attention/concentration, recent and remote memory (including registration and recall), and fund of knowledge intact    Cranial nerves - PERRLA, VFF, EOMI, face sensation (V1-V3) intact b/l, facial strength intact without asymmetry b/l, hearing intact b/l, palate with symmetric elevation, trapezius OR sternocleidomastiod 5/5 strength b/l, tongue midline on protrusion with full lateral movement    Motor - Normal bulk and tone throughout. No pronator drift.  Strength testing            Deltoid      Biceps      Triceps     Wrist Extension    Wrist Flexion     Interossei         R            5                 5               5                     5                              5                        5                 5  L             5                 5               5                     5                              5                        5                 5              Hip Flexion    Hip Extension    Knee Flexion    Knee Extension    Dorsiflexion    Plantar Flexion  R              5                           5                       5                           5                            5                          5  L              5                           5                        5                           5                            5                          5    Sensation - Light touch/temperature OR pain/vibration intact throughout    DTR's -             Biceps      Triceps     Brachioradialis      Patellar    Ankle    Toes/plantar response  R             2+             2+                  2+                       2+            2+                 Down  L              2+             2+                 2+                        2+           2+                 Down    Coordination - Finger to Nose intact b/l. No tremors appreciated    Gait and station - Normal casual gait. Romberg (-)    Labs:                        9.2    18.07 )-----------( 142      ( 12 Dec 2022 00:41 )             28.2     12-12    134<L>  |  99  |  25<H>  ----------------------------<  100<H>  3.8   |  20<L>  |  1.55<H>    Ca    8.3<L>      12 Dec 2022 00:41  Phos  3.4     12-12  Mg     2.1     12-12    TPro  6.2  /  Alb  2.8<L>  /  TBili  1.2  /  DBili  x   /  AST  44<H>  /  ALT  18  /  AlkPhos  134<H>  12-12    CAPILLARY BLOOD GLUCOSE  102 (12 Dec 2022 02:00)      POCT Blood Glucose.: 102 mg/dL (12 Dec 2022 01:49)    LIVER FUNCTIONS - ( 12 Dec 2022 00:41 )  Alb: 2.8 g/dL / Pro: 6.2 g/dL / ALK PHOS: 134 U/L / ALT: 18 U/L / AST: 44 U/L / GGT: x             Culture - Blood (collected 10 Dec 2022 15:45)  Source: .Blood Blood-Peripheral  Preliminary Report (11 Dec 2022 22:02):    No growth to date.    Culture - Blood (collected 10 Dec 2022 15:30)  Source: .Blood Blood-Peripheral  Preliminary Report (11 Dec 2022 22:02):    No growth to date.    Culture - Sputum (collected 10 Dec 2022 10:49)  Source: ET Tube ET Tube  Gram Stain (10 Dec 2022 23:07):    Moderate polymorphonuclear leukocytes per low power field    No Squamous epithelial cells per low power field    Few Yeast like cells per oil power field  Preliminary Report (11 Dec 2022 18:53):    Normal Respiratory Christy present    Culture - Sputum (collected 09 Dec 2022 04:58)  Source: ET Tube ET Tube  Gram Stain (09 Dec 2022 15:01):    Numerous polymorphonuclear leukocytes per low power field    Moderate Squamous epithelial cells per low power field    Few Yeast like cells    per oil power field  Final Report (11 Dec 2022 14:03):    Normal Respiratory Christy present      PT/INR - ( 12 Dec 2022 00:41 )   PT: 13.7 sec;   INR: 1.18 ratio         PTT - ( 12 Dec 2022 00:41 )  PTT:30.7 sec  CSF:                  Radiology:  CT Head No Cont:  (06 Dec 2022 13:02)     Neurology - Consult Note  -  Spectra: [id number] ([facility]), [phone] ([facility])  -  HPI: Per chart review: Patient EDUARDO REAL is a 62y (1960) man with a hx of CAD s/p 4v CABG ~2019 in [facility], DMII A1c 7.3%, HTN, HLD, who originally presented as a transfer from [facility] with concern for STEMI, had presented to [facility] with abdominal pain, n/v, was found to have pancreatitis and elevated troponins. CT abdomen revealed acute pancreatitis and his initial LVEF was 40-45%. He developed MSOF with hypoxic respiratory failure requiring intubation and ERIC and went into hypoxic PEA arrest requiring ACLS and due to persistent hypoxia and hypotension on pressors after ROSC was cannulated to [facility] on 11/25. Notably CTH that day revealed small subdural hemorrhage. His post arrest TTE on 11/26 showed a significantly worse LVEF of 5-10% with an AV that was only minimally opening.      Per consult received over the phone: Patient s/p fulminant cardiogenic shock had been placed on ECMO, now off of ECMO and on balloon pump, was on anticoagulation entire time. Had been noted to have a small SDH on 11/25. Patient was following commands. On 12/11/22, patient noted to have an acute change: no longer following any commands, not moving arms or legs b/l, not withdrawing to pain, does track with eyes, but not responding to questions or commands. Last seen at baseline by nurse at 10:45PM. Anticoagulation was held, NSGY was consulted, CTH was done STAT, patient was okayed by neurosurgery to continue back on anticoagulation, was placed back on a heparin drip. Neurology was consulted.  CTH noncontrast looks similar to prior, awaiting official read.   Labs: WBC 18. HGB 9.2. MCV 89. RDW 15. . INR 1.18. PT 13.7. PTT 30.7. Chance 134. BUN 25. Cr 1.55. Glu 100. Ca 8.3. Alb 2.8. AlkP 134. Amylase 206. Lipase 404. LDL 63. A1c 7.3.  . /45. RR 30. O2 100%. Temp 100.8 Patient on heparin drip, insulin, milrinone. Febrile, ID following, pt on vancomycin and cefepime.    TTE 12/10   1. Mitral annular calcification, otherwise normal mitral valve. Mild-moderate mitral regurgitation.  2. Normal left ventricular internal dimensions and wall thickness.  3. Severe global left ventricular systolic dysfunction. Endocardial visualization enhanced with intravenous injection of Ultrasonic Enhancing Agent (Definity).  No LV thrombus seen.  4. The right ventricle is not well visualized.    Review of Systems:  patient intubated         Medications:  MEDICATIONS  (STANDING):  acetaminophen   IVPB .. 1000 milliGRAM(s) IV Intermittent once  aMIOdarone    Tablet   Oral   aMIOdarone    Tablet 400 milliGRAM(s) Oral every 8 hours  atorvastatin 40 milliGRAM(s) Oral at bedtime  cefepime   IVPB 1000 milliGRAM(s) IV Intermittent every 8 hours  chlorhexidine 0.12% Liquid 15 milliLiter(s) Oral Mucosa every 12 hours  chlorhexidine 2% Cloths 1 Application(s) Topical <User Schedule>  CRRT Treatment    <Continuous>  dexMEDEtomidine Infusion 1 MICROgram(s)/kG/Hr (23.2 mL/Hr) IV Continuous <Continuous>  fluconAZOLE IVPB      fluconAZOLE IVPB 200 milliGRAM(s) IV Intermittent every 24 hours  heparin  Infusion 800 Unit(s)/Hr (9 mL/Hr) IV Continuous <Continuous>  insulin regular Infusion 3 Unit(s)/Hr (3 mL/Hr) IV Continuous <Continuous>  milrinone Infusion 0.1 MICROgram(s)/kG/Min (2.79 mL/Hr) IV Continuous <Continuous>  pantoprazole  Injectable 40 milliGRAM(s) IV Push two times a day  Phoxillum Filtration BK 4 / 2.5 5000 milliLiter(s) (1200 mL/Hr) CRRT <Continuous>  polyethylene glycol 3350 17 Gram(s) Oral two times a day  PrismaSATE Dialysate BK 0 / 3.5 5000 milliLiter(s) (2000 mL/Hr) CRRT <Continuous>  PrismaSOL Filtration BGK 4 / 2.5 5000 milliLiter(s) (200 mL/Hr) CRRT <Continuous>  senna 2 Tablet(s) Oral at bedtime  sodium chloride 0.9%. 1000 milliLiter(s) (10 mL/Hr) IV Continuous <Continuous>  vancomycin  IVPB 1000 milliGRAM(s) IV Intermittent every 12 hours  vasopressin Infusion 0.033 Unit(s)/Min (5 mL/Hr) IV Continuous <Continuous>    MEDICATIONS  (PRN):      Vitals:  T(C): 38.2 (12-12-22 @ 01:45), Max: 38.2 (12-12-22 @ 01:45)  HR: 118 (12-12-22 @ 01:45) (75 - 170)  BP: --  RR: 25 (12-12-22 @ 01:45) (8 - 32)  SpO2: 100% (12-12-22 @ 01:45) (96% - 100%)    Physical Examination:    General - Male patient, lying in bed, intubated, patient constantly shivering  . 100% O2 sat on vent. RR 29. Temp 37.7. BP 96/55.   Pt is on IABP, is on amiodarone 0.5mg/min, heparin 1100 units/hr, milrinone 0.1mg/kg/min, IVF. (Per nurse, had recently been on precedex 0.3 but it was turned off at 11pm)  Cardiovascular - edema b/l le  Eyes -  Fundoscopy not performed due to safety precautions in the setting of the COVID-19 pandemic    Neurologic Exam:  Mental status - Awake, eyes open, blinking constantly, not following any commands (squeeze hands, close eyes, raise eyebrows, etc)    Cranial nerves - PERRL, VF: difficult to asses blink to threat as patient blinking often, corneals intact; EOM: right gaze preference, can be overcome with oculocephalic maneuver; patient not tracking, face sensation cannot asses; facial strength intact without asymmetry b/l, hearing difficult to assess, tongue cannot assess; gag intact    Motor - Normal bulk and tone throughout.  Patient not moving any extremity spontaneously, limbs fall immediately.    Sensation - No grimace to noxious stimuli in all extremities.     DTR's -             Biceps      Triceps     Brachioradialis      Patellar      Toes/plantar response  R             0+             0+                  2+          0                  mute  L              2+             2+                 2+          0                 mute    Coordination - cannot assess    Gait and station - cannot assess    Labs:                        9.2    18.07 )-----------( 142      ( 12 Dec 2022 00:41 )             28.2     12-12    134<L>  |  99  |  25<H>  ----------------------------<  100<H>  3.8   |  20<L>  |  1.55<H>    Ca    8.3<L>      12 Dec 2022 00:41  Phos  3.4     12-12  Mg     2.1     12-12    TPro  6.2  /  Alb  2.8<L>  /  TBili  1.2  /  DBili  x   /  AST  44<H>  /  ALT  18  /  AlkPhos  134<H>  12-12    CAPILLARY BLOOD GLUCOSE  102 (12 Dec 2022 02:00)      POCT Blood Glucose.: 102 mg/dL (12 Dec 2022 01:49)    LIVER FUNCTIONS - ( 12 Dec 2022 00:41 )  Alb: 2.8 g/dL / Pro: 6.2 g/dL / ALK PHOS: 134 U/L / ALT: 18 U/L / AST: 44 U/L / GGT: x             Culture - Blood (collected 10 Dec 2022 15:45)  Source: .Blood Blood-Peripheral  Preliminary Report (11 Dec 2022 22:02):    No growth to date.    Culture - Blood (collected 10 Dec 2022 15:30)  Source: .Blood Blood-Peripheral  Preliminary Report (11 Dec 2022 22:02):    No growth to date.    Culture - Sputum (collected 10 Dec 2022 10:49)  Source: ET Tube ET Tube  Gram Stain (10 Dec 2022 23:07):    Moderate polymorphonuclear leukocytes per low power field    No Squamous epithelial cells per low power field    Few Yeast like cells per oil power field  Preliminary Report (11 Dec 2022 18:53):    Normal Respiratory Christy present    Culture - Sputum (collected 09 Dec 2022 04:58)  Source: ET Tube ET Tube  Gram Stain (09 Dec 2022 15:01):    Numerous polymorphonuclear leukocytes per low power field    Moderate Squamous epithelial cells per low power field    Few Yeast like cells    per oil power field  Final Report (11 Dec 2022 14:03):    Normal Respiratory Christy present      PT/INR - ( 12 Dec 2022 00:41 )   PT: 13.7 sec;   INR: 1.18 ratio         PTT - ( 12 Dec 2022 00:41 )  PTT:30.7 sec  CSF:        Radiology:  CT Head No Cont:  (06 Dec 2022 13:02)     Neurology - Consult Note  -  Spectra: [phone] ([facility]), [phone] ([facility])  -  HPI: Per chart review: Patient EDUARDO REAL is a 62y (1960) man with a hx of CAD s/p 4v CABG ~2019 in [facility], DMII A1c 7.3%, HTN, HLD, who originally presented as a transfer from [facility] with concern for STEMI, had presented to [facility] with abdominal pain, n/v, was found to have pancreatitis and elevated troponins. CT abdomen revealed acute pancreatitis and his initial LVEF was 40-45%. He developed MSOF with hypoxic respiratory failure requiring intubation and ERIC and went into hypoxic PEA arrest requiring ACLS and due to persistent hypoxia and hypotension on pressors after ROSC was cannulated to [facility] ECMO on 11/25. Notably CTH that day revealed small subdural hemorrhage. His post arrest TTE on 11/26 showed a significantly worse LVEF of 5-10% with an AV that was only minimally opening.      Per consult received over the phone: Patient s/p fulminant cardiogenic shock had been placed on ECMO, now off of ECMO and on balloon pump, was on anticoagulation entire time. Had been noted to have a small SDH on 11/25. Patient was following commands. On 12/11/22, patient noted to have an acute change: no longer following any commands, not moving arms or legs b/l, not withdrawing to pain, does track with eyes, but not responding to questions or commands. Last seen at baseline by nurse at 10:45PM. Anticoagulation was held, NSGY was consulted, CTH was done STAT, patient was okayed by neurosurgery to continue back on anticoagulation, was placed back on a heparin drip. Neurology was consulted.  CTH noncontrast looks similar to prior, awaiting official read.   Labs: WBC 18. HGB 9.2. MCV 89. RDW 15. . INR 1.18. PT 13.7. PTT 30.7. Chance 134. BUN 25. Cr 1.55. Glu 100. Ca 8.3. Alb 2.8. AlkP 134. Amylase 206. Lipase 404. LDL 63. A1c 7.3.  . /45. RR 30. O2 100%. Temp 100.8 Patient on heparin drip, insulin, milrinone. Febrile, ID following, pt on vancomycin and cefepime.    TTE 12/10   1. Mitral annular calcification, otherwise normal mitral valve. Mild-moderate mitral regurgitation.  2. Normal left ventricular internal dimensions and wall thickness.  3. Severe global left ventricular systolic dysfunction. Endocardial visualization enhanced with intravenous injection of Ultrasonic Enhancing Agent (Definity).  No LV thrombus seen.  4. The right ventricle is not well visualized.    Review of Systems:  patient intubated         Medications:  MEDICATIONS  (STANDING):  acetaminophen   IVPB .. 1000 milliGRAM(s) IV Intermittent once  aMIOdarone    Tablet   Oral   aMIOdarone    Tablet 400 milliGRAM(s) Oral every 8 hours  atorvastatin 40 milliGRAM(s) Oral at bedtime  cefepime   IVPB 1000 milliGRAM(s) IV Intermittent every 8 hours  chlorhexidine 0.12% Liquid 15 milliLiter(s) Oral Mucosa every 12 hours  chlorhexidine 2% Cloths 1 Application(s) Topical <User Schedule>  CRRT Treatment    <Continuous>  dexMEDEtomidine Infusion 1 MICROgram(s)/kG/Hr (23.2 mL/Hr) IV Continuous <Continuous>  fluconAZOLE IVPB      fluconAZOLE IVPB 200 milliGRAM(s) IV Intermittent every 24 hours  heparin  Infusion 800 Unit(s)/Hr (9 mL/Hr) IV Continuous <Continuous>  insulin regular Infusion 3 Unit(s)/Hr (3 mL/Hr) IV Continuous <Continuous>  milrinone Infusion 0.1 MICROgram(s)/kG/Min (2.79 mL/Hr) IV Continuous <Continuous>  pantoprazole  Injectable 40 milliGRAM(s) IV Push two times a day  Phoxillum Filtration BK 4 / 2.5 5000 milliLiter(s) (1200 mL/Hr) CRRT <Continuous>  polyethylene glycol 3350 17 Gram(s) Oral two times a day  PrismaSATE Dialysate BK 0 / 3.5 5000 milliLiter(s) (2000 mL/Hr) CRRT <Continuous>  PrismaSOL Filtration BGK 4 / 2.5 5000 milliLiter(s) (200 mL/Hr) CRRT <Continuous>  senna 2 Tablet(s) Oral at bedtime  sodium chloride 0.9%. 1000 milliLiter(s) (10 mL/Hr) IV Continuous <Continuous>  vancomycin  IVPB 1000 milliGRAM(s) IV Intermittent every 12 hours  vasopressin Infusion 0.033 Unit(s)/Min (5 mL/Hr) IV Continuous <Continuous>    MEDICATIONS  (PRN):      Vitals:  T(C): 38.2 (12-12-22 @ 01:45), Max: 38.2 (12-12-22 @ 01:45)  HR: 118 (12-12-22 @ 01:45) (75 - 170)  BP: --  RR: 25 (12-12-22 @ 01:45) (8 - 32)  SpO2: 100% (12-12-22 @ 01:45) (96% - 100%)    Physical Examination:    General - Male patient, lying in bed, intubated, patient constantly shivering  . 100% O2 sat on vent. RR 29. Temp 37.7. BP 96/55.   Pt is on IABP, is on amiodarone 0.5mg/min, heparin 1100 units/hr, milrinone 0.1mg/kg/min, IVF. (Per nurse, had recently been on precedex 0.3 but it was turned off at 11pm)  Cardiovascular - edema b/l le  Eyes -  Fundoscopy not performed due to safety precautions in the setting of the COVID-19 pandemic    Neurologic Exam:  Mental status - Awake, eyes open, blinking constantly, not following any commands (squeeze hands, close eyes, raise eyebrows, etc)    Cranial nerves - PERRL, VF: difficult to asses blink to threat as patient blinking often, corneals intact; EOM: right gaze preference, can be overcome with oculocephalic maneuver; patient not tracking, face sensation cannot asses; facial strength intact without asymmetry b/l, hearing difficult to assess, tongue cannot assess; gag intact    Motor - Normal bulk and tone throughout.  Patient not moving any extremity spontaneously, limbs fall immediately.    Sensation - No grimace to noxious stimuli in all extremities.     DTR's -             Biceps      Triceps     Brachioradialis      Patellar      Toes/plantar response  R             0+             0+                  2+          0                  mute  L              2+             2+                 2+          0                 mute    Coordination - cannot assess    Gait and station - cannot assess    Labs:                        9.2    18.07 )-----------( 142      ( 12 Dec 2022 00:41 )             28.2     12-12    134<L>  |  99  |  25<H>  ----------------------------<  100<H>  3.8   |  20<L>  |  1.55<H>    Ca    8.3<L>      12 Dec 2022 00:41  Phos  3.4     12-12  Mg     2.1     12-12    TPro  6.2  /  Alb  2.8<L>  /  TBili  1.2  /  DBili  x   /  AST  44<H>  /  ALT  18  /  AlkPhos  134<H>  12-12    CAPILLARY BLOOD GLUCOSE  102 (12 Dec 2022 02:00)      POCT Blood Glucose.: 102 mg/dL (12 Dec 2022 01:49)    LIVER FUNCTIONS - ( 12 Dec 2022 00:41 )  Alb: 2.8 g/dL / Pro: 6.2 g/dL / ALK PHOS: 134 U/L / ALT: 18 U/L / AST: 44 U/L / GGT: x             Culture - Blood (collected 10 Dec 2022 15:45)  Source: .Blood Blood-Peripheral  Preliminary Report (11 Dec 2022 22:02):    No growth to date.    Culture - Blood (collected 10 Dec 2022 15:30)  Source: .Blood Blood-Peripheral  Preliminary Report (11 Dec 2022 22:02):    No growth to date.    Culture - Sputum (collected 10 Dec 2022 10:49)  Source: ET Tube ET Tube  Gram Stain (10 Dec 2022 23:07):    Moderate polymorphonuclear leukocytes per low power field    No Squamous epithelial cells per low power field    Few Yeast like cells per oil power field  Preliminary Report (11 Dec 2022 18:53):    Normal Respiratory Christy present    Culture - Sputum (collected 09 Dec 2022 04:58)  Source: ET Tube ET Tube  Gram Stain (09 Dec 2022 15:01):    Numerous polymorphonuclear leukocytes per low power field    Moderate Squamous epithelial cells per low power field    Few Yeast like cells    per oil power field  Final Report (11 Dec 2022 14:03):    Normal Respiratory Christy present      PT/INR - ( 12 Dec 2022 00:41 )   PT: 13.7 sec;   INR: 1.18 ratio         PTT - ( 12 Dec 2022 00:41 )  PTT:30.7 sec  CSF:        Radiology:  CT Head No Cont:  (06 Dec 2022 13:02)     Neurology - Consult Note  -  Spectra: [phone] ([facility]), [id number] ([facility])  -  HPI: Per chart review: Patient EDUARDO REAL is a 62y (1960) man with a hx of CAD s/p 4v CABG ~2019 in [facility], DMII A1c 7.3%, HTN, HLD, who originally presented as a transfer from [facility] with [facility] for [facility], [facility], was found to have pancreatitis and elevated troponins. CT abdomen revealed acute pancreatitis and his initial LVEF was 40-45%. He developed MSOF with hypoxic respiratory failure requiring intubation and ERIC and went into hypoxic PEA arrest requiring ACLS and due to persistent hypoxia and hypotension on pressors after ROSC was cannulated to [facility] ECMO on 11/25. Notably CTH that day revealed small subdural hemorrhage. His post arrest TTE on 11/26 showed a significantly worse LVEF of 5-10% with an AV that was only minimally opening.      Per consult received over the phone: Patient s/p fulminant cardiogenic shock had been placed on ECMO, now off of ECMO and on balloon pump, was on anticoagulation entire time. Had been noted to have a small SDH on 11/25. Patient was following commands. On 12/11/22, patient noted to have an acute change: no longer following any commands, not moving arms or legs b/l, not withdrawing to pain, does track with eyes, but not responding to questions or commands. Last seen at baseline by nurse at 10:45PM. Anticoagulation was held, NSGY was consulted, CTH was done STAT, patient was okayed by neurosurgery to continue back on anticoagulation, was placed back on a heparin drip. Neurology was consulted.  CTH noncontrast looks similar to prior, awaiting official read.   Labs: WBC 18. HGB 9.2. MCV 89. RDW 15. . INR 1.18. PT 13.7. PTT 30.7. Chance 134. BUN 25. Cr 1.55. Glu 100. Ca 8.3. Alb 2.8. AlkP 134. Amylase 206. Lipase 404. LDL 63. A1c 7.3.  . /45. RR 30. O2 100%. Temp 100.8 Patient on heparin drip, insulin, milrinone. Febrile, ID following, pt on vancomycin and cefepime.    TTE 12/10   1. Mitral annular calcification, otherwise normal mitral valve. Mild-moderate mitral regurgitation.  2. Normal left ventricular internal dimensions and wall thickness.  3. Severe global left ventricular systolic dysfunction. Endocardial visualization enhanced with intravenous injection of Ultrasonic Enhancing Agent (Definity).  No LV thrombus seen.  4. The right ventricle is not well visualized.    Review of Systems:  patient intubated         Medications:  MEDICATIONS  (STANDING):  acetaminophen   IVPB .. 1000 milliGRAM(s) IV Intermittent once  aMIOdarone    Tablet   Oral   aMIOdarone    Tablet 400 milliGRAM(s) Oral every 8 hours  atorvastatin 40 milliGRAM(s) Oral at bedtime  cefepime   IVPB 1000 milliGRAM(s) IV Intermittent every 8 hours  chlorhexidine 0.12% Liquid 15 milliLiter(s) Oral Mucosa every 12 hours  chlorhexidine 2% Cloths 1 Application(s) Topical <User Schedule>  CRRT Treatment    <Continuous>  dexMEDEtomidine Infusion 1 MICROgram(s)/kG/Hr (23.2 mL/Hr) IV Continuous <Continuous>  fluconAZOLE IVPB      fluconAZOLE IVPB 200 milliGRAM(s) IV Intermittent every 24 hours  heparin  Infusion 800 Unit(s)/Hr (9 mL/Hr) IV Continuous <Continuous>  insulin regular Infusion 3 Unit(s)/Hr (3 mL/Hr) IV Continuous <Continuous>  milrinone Infusion 0.1 MICROgram(s)/kG/Min (2.79 mL/Hr) IV Continuous <Continuous>  pantoprazole  Injectable 40 milliGRAM(s) IV Push two times a day  Phoxillum Filtration BK 4 / 2.5 5000 milliLiter(s) (1200 mL/Hr) CRRT <Continuous>  polyethylene glycol 3350 17 Gram(s) Oral two times a day  PrismaSATE Dialysate BK 0 / 3.5 5000 milliLiter(s) (2000 mL/Hr) CRRT <Continuous>  PrismaSOL Filtration BGK 4 / 2.5 5000 milliLiter(s) (200 mL/Hr) CRRT <Continuous>  senna 2 Tablet(s) Oral at bedtime  sodium chloride 0.9%. 1000 milliLiter(s) (10 mL/Hr) IV Continuous <Continuous>  vancomycin  IVPB 1000 milliGRAM(s) IV Intermittent every 12 hours  vasopressin Infusion 0.033 Unit(s)/Min (5 mL/Hr) IV Continuous <Continuous>    MEDICATIONS  (PRN):      Vitals:  T(C): 38.2 (12-12-22 @ 01:45), Max: 38.2 (12-12-22 @ 01:45)  HR: 118 (12-12-22 @ 01:45) (75 - 170)  BP: --  RR: 25 (12-12-22 @ 01:45) (8 - 32)  SpO2: 100% (12-12-22 @ 01:45) (96% - 100%)    Physical Examination:    General - Male patient, lying in bed, intubated, patient constantly shivering  . 100% O2 sat on vent. RR 29. Temp 37.7. BP 96/55.   Pt is on IABP, is on amiodarone 0.5mg/min, heparin 1100 units/hr, milrinone 0.1mg/kg/min, IVF. (Per nurse, had recently been on precedex 0.3 but it was turned off at 11pm)  Cardiovascular - edema b/l le  Eyes -  Fundoscopy not performed due to safety precautions in the setting of the COVID-19 pandemic    Neurologic Exam:  Mental status - Awake, eyes open, blinking constantly, not following any commands (squeeze hands, close eyes, raise eyebrows, etc)    Cranial nerves - PERRL, VF: difficult to asses blink to threat as patient blinking often, corneals intact; EOM: right gaze preference, can be overcome with oculocephalic maneuver; patient not tracking, face sensation cannot asses; facial strength intact without asymmetry b/l, hearing difficult to assess, tongue cannot assess; gag intact    Motor - Normal bulk and tone throughout.  Patient not moving any extremity spontaneously, limbs fall immediately.    Sensation - No grimace to noxious stimuli in all extremities.     DTR's -             Biceps      Triceps     Brachioradialis      Patellar      Toes/plantar response  R             0+             0+                  2+          0                  mute  L              2+             2+                 2+          0                 mute    Coordination - cannot assess    Gait and station - cannot assess    Labs:                        9.2    18.07 )-----------( 142      ( 12 Dec 2022 00:41 )             28.2     12-12    134<L>  |  99  |  25<H>  ----------------------------<  100<H>  3.8   |  20<L>  |  1.55<H>    Ca    8.3<L>      12 Dec 2022 00:41  Phos  3.4     12-12  Mg     2.1     12-12    TPro  6.2  /  Alb  2.8<L>  /  TBili  1.2  /  DBili  x   /  AST  44<H>  /  ALT  18  /  AlkPhos  134<H>  12-12    CAPILLARY BLOOD GLUCOSE  102 (12 Dec 2022 02:00)      POCT Blood Glucose.: 102 mg/dL (12 Dec 2022 01:49)    LIVER FUNCTIONS - ( 12 Dec 2022 00:41 )  Alb: 2.8 g/dL / Pro: 6.2 g/dL / ALK PHOS: 134 U/L / ALT: 18 U/L / AST: 44 U/L / GGT: x             Culture - Blood (collected 10 Dec 2022 15:45)  Source: .Blood Blood-Peripheral  Preliminary Report (11 Dec 2022 22:02):    No growth to date.    Culture - Blood (collected 10 Dec 2022 15:30)  Source: .Blood Blood-Peripheral  Preliminary Report (11 Dec 2022 22:02):    No growth to date.    Culture - Sputum (collected 10 Dec 2022 10:49)  Source: ET Tube ET Tube  Gram Stain (10 Dec 2022 23:07):    Moderate polymorphonuclear leukocytes per low power field    No Squamous epithelial cells per low power field    Few Yeast like cells per oil power field  Preliminary Report (11 Dec 2022 18:53):    Normal Respiratory Christy present    Culture - Sputum (collected 09 Dec 2022 04:58)  Source: ET Tube ET Tube  Gram Stain (09 Dec 2022 15:01):    Numerous polymorphonuclear leukocytes per low power field    Moderate Squamous epithelial cells per low power field    Few Yeast like cells    per oil power field  Final Report (11 Dec 2022 14:03):    Normal Respiratory Christy present      PT/INR - ( 12 Dec 2022 00:41 )   PT: 13.7 sec;   INR: 1.18 ratio         PTT - ( 12 Dec 2022 00:41 )  PTT:30.7 sec  CSF:        Radiology:  CT Head No Cont:  (06 Dec 2022 13:02)     Neurology - Consult Note  -  Spectra: [id number] ([facility]), [id number] ([facility])  -  HPI: Per chart review: Patient EDUARDO REAL is a 62y (1960) man with a hx of CAD s/p 4v CABG ~2019 in [facility], DMII A1c 7.3%, HTN, HLD, who originally presented as a transfer from [facility] with concern for STEMI, had presented to [facility] with abdominal pain, n/v, was found to have pancreatitis and elevated troponins. CT abdomen revealed acute pancreatitis and his initial LVEF was 40-45%. He developed MSOF with hypoxic respiratory failure requiring intubation and ERIC and went into hypoxic PEA arrest requiring ACLS and due to persistent hypoxia and hypotension on pressors after ROSC was cannulated to [facility] ECMO on 11/25. Notably CTH that day revealed small subdural hemorrhage. His post arrest TTE on 11/26 showed a significantly worse LVEF of 5-10% with an AV that was only minimally opening.      Per consult received over the phone: Patient s/p fulminant cardiogenic shock had been placed on ECMO, now off of ECMO and on balloon pump, was on anticoagulation entire time. Had been noted to have a small SDH on 11/25. Patient was following commands. On 12/11/22, patient noted to have an acute change: no longer following any commands, not moving arms or legs b/l, not withdrawing to pain, does track with eyes, but not responding to questions or commands. Last seen at baseline by nurse at 10:45PM. Anticoagulation was held, NSGY was consulted, CTH was done STAT, patient was okayed by neurosurgery to continue back on anticoagulation, was placed back on a heparin drip. Neurology was consulted.  CTH noncontrast looks similar to prior, awaiting official read.   Labs: WBC 18. HGB 9.2. MCV 89. RDW 15. . INR 1.18. PT 13.7. PTT 30.7. Chance 134. BUN 25. Cr 1.55. Glu 100. Ca 8.3. Alb 2.8. AlkP 134. Amylase 206. Lipase 404. LDL 63. A1c 7.3.  . /45. RR 30. O2 100%. Temp 100.8 Patient on heparin drip, insulin, milrinone. Febrile, ID following, pt on vancomycin and cefepime.    TTE 12/10   1. Mitral annular calcification, otherwise normal mitral valve. Mild-moderate mitral regurgitation.  2. Normal left ventricular internal dimensions and wall thickness.  3. Severe global left ventricular systolic dysfunction. Endocardial visualization enhanced with intravenous injection of Ultrasonic Enhancing Agent (Definity).  No LV thrombus seen.  4. The right ventricle is not well visualized.    Review of Systems:  patient intubated so unable to assess (HYUN)    Allergies: NKDA    PMHx/PSHx: As above    FHx: HYUN    SHx: No reports of current tobacco, alcohol, or illicit drug use    Medications:  MEDICATIONS  (STANDING):  acetaminophen   IVPB .. 1000 milliGRAM(s) IV Intermittent once  aMIOdarone    Tablet   Oral   aMIOdarone    Tablet 400 milliGRAM(s) Oral every 8 hours  atorvastatin 40 milliGRAM(s) Oral at bedtime  cefepime   IVPB 1000 milliGRAM(s) IV Intermittent every 8 hours  chlorhexidine 0.12% Liquid 15 milliLiter(s) Oral Mucosa every 12 hours  chlorhexidine 2% Cloths 1 Application(s) Topical <User Schedule>  CRRT Treatment    <Continuous>  dexMEDEtomidine Infusion 1 MICROgram(s)/kG/Hr (23.2 mL/Hr) IV Continuous <Continuous>  fluconAZOLE IVPB      fluconAZOLE IVPB 200 milliGRAM(s) IV Intermittent every 24 hours  heparin  Infusion 800 Unit(s)/Hr (9 mL/Hr) IV Continuous <Continuous>  insulin regular Infusion 3 Unit(s)/Hr (3 mL/Hr) IV Continuous <Continuous>  milrinone Infusion 0.1 MICROgram(s)/kG/Min (2.79 mL/Hr) IV Continuous <Continuous>  pantoprazole  Injectable 40 milliGRAM(s) IV Push two times a day  Phoxillum Filtration BK 4 / 2.5 5000 milliLiter(s) (1200 mL/Hr) CRRT <Continuous>  polyethylene glycol 3350 17 Gram(s) Oral two times a day  PrismaSATE Dialysate BK 0 / 3.5 5000 milliLiter(s) (2000 mL/Hr) CRRT <Continuous>  PrismaSOL Filtration BGK 4 / 2.5 5000 milliLiter(s) (200 mL/Hr) CRRT <Continuous>  senna 2 Tablet(s) Oral at bedtime  sodium chloride 0.9%. 1000 milliLiter(s) (10 mL/Hr) IV Continuous <Continuous>  vancomycin  IVPB 1000 milliGRAM(s) IV Intermittent every 12 hours  vasopressin Infusion 0.033 Unit(s)/Min (5 mL/Hr) IV Continuous <Continuous>    MEDICATIONS  (PRN):      Vitals:  T(C): 38.2 (12-12-22 @ 01:45), Max: 38.2 (12-12-22 @ 01:45)  HR: 118 (12-12-22 @ 01:45) (75 - 170)  BP: --  RR: 25 (12-12-22 @ 01:45) (8 - 32)  SpO2: 100% (12-12-22 @ 01:45) (96% - 100%)    Physical Examination:    General - Male patient, lying in bed, intubated, patient constantly shivering  . 100% O2 sat on vent. RR 29. Temp 37.7. BP 96/55.   Pt is on IABP, is on amiodarone 0.5mg/min, heparin 1100 units/hr, milrinone 0.1mg/kg/min, IVF. (Per nurse, had recently been on precedex 0.3 but it was turned off at 11pm)  Cardiovascular - edema b/l le  Eyes -  Fundoscopy not performed due to safety precautions in the setting of the COVID-19 pandemic    Neurologic Exam:  Mental status - Awake, eyes open, blinking constantly, not following any commands (squeeze hands, close eyes, raise eyebrows, etc)    Cranial nerves - PERRL, VF: difficult to asses blink to threat as patient blinking often, corneals intact; EOM: right gaze preference, can be overcome with oculocephalic maneuver; patient not tracking, face sensation cannot asses; facial strength intact without asymmetry b/l, hearing difficult to assess, tongue cannot assess; gag intact    Motor - Normal bulk and tone throughout.  Patient not moving any extremity spontaneously, limbs fall immediately.    Sensation - No grimace to noxious stimuli in all extremities.     DTR's -             Biceps      Triceps     Brachioradialis      Patellar      Toes/plantar response  R             0+             0+                  2+          0                  mute  L              2+             2+                 2+          0                 mute    Coordination - cannot assess    Gait and station - cannot assess    Labs:                        9.2    18.07 )-----------( 142      ( 12 Dec 2022 00:41 )             28.2     12-12    134<L>  |  99  |  25<H>  ----------------------------<  100<H>  3.8   |  20<L>  |  1.55<H>    Ca    8.3<L>      12 Dec 2022 00:41  Phos  3.4     12-12  Mg     2.1     12-12    TPro  6.2  /  Alb  2.8<L>  /  TBili  1.2  /  DBili  x   /  AST  44<H>  /  ALT  18  /  AlkPhos  134<H>  12-12    CAPILLARY BLOOD GLUCOSE  102 (12 Dec 2022 02:00)      POCT Blood Glucose.: 102 mg/dL (12 Dec 2022 01:49)    LIVER FUNCTIONS - ( 12 Dec 2022 00:41 )  Alb: 2.8 g/dL / Pro: 6.2 g/dL / ALK PHOS: 134 U/L / ALT: 18 U/L / AST: 44 U/L / GGT: x             Culture - Blood (collected 10 Dec 2022 15:45)  Source: .Blood Blood-Peripheral  Preliminary Report (11 Dec 2022 22:02):    No growth to date.    Culture - Blood (collected 10 Dec 2022 15:30)  Source: .Blood Blood-Peripheral  Preliminary Report (11 Dec 2022 22:02):    No growth to date.    Culture - Sputum (collected 10 Dec 2022 10:49)  Source: ET Tube ET Tube  Gram Stain (10 Dec 2022 23:07):    Moderate polymorphonuclear leukocytes per low power field    No Squamous epithelial cells per low power field    Few Yeast like cells per oil power field  Preliminary Report (11 Dec 2022 18:53):    Normal Respiratory Christy present    Culture - Sputum (collected 09 Dec 2022 04:58)  Source: ET Tube ET Tube  Gram Stain (09 Dec 2022 15:01):    Numerous polymorphonuclear leukocytes per low power field    Moderate Squamous epithelial cells per low power field    Few Yeast like cells    per oil power field  Final Report (11 Dec 2022 14:03):    Normal Respiratory Christy present      PT/INR - ( 12 Dec 2022 00:41 )   PT: 13.7 sec;   INR: 1.18 ratio         PTT - ( 12 Dec 2022 00:41 )  PTT:30.7 sec  CSF:        Radiology:  CT Head No Cont:  (06 Dec 2022 13:02)     Neurology - Consult Note  -  Spectra: [id number] ([facility]), [phone] ([facility])  -  HPI: Per chart review: Patient EDUARDO REAL is a 62y (1960) man with a hx of CAD s/p 4v CABG ~2019 in [facility], DMII A1c 7.3%, HTN, HLD, who originally presented as a transfer from [facility] with concern for STEMI, had presented to [facility] with abdominal pain, n/v, was found to have pancreatitis and elevated troponins. CT abdomen revealed acute pancreatitis and his initial LVEF was 40-45%. He developed MSOF with hypoxic respiratory failure requiring intubation and ERIC and went into hypoxic PEA arrest requiring ACLS and due to persistent hypoxia and hypotension on pressors after ROSC was cannulated to [facility] ECMO on 11/25. Notably CTH that day revealed small subdural hemorrhage. His post arrest TTE on 11/26 showed a significantly worse LVEF of 5-10% with an AV that was only minimally opening.      Per consult received over the phone: Patient s/p fulminant cardiogenic shock had been placed on ECMO, now off of ECMO and on balloon pump, was on anticoagulation entire time. Had been noted to have a small SDH on 11/25. Patient was following commands. On 12/11/22, patient noted to have an acute change: no longer following any commands, not moving arms or legs b/l, not withdrawing to pain, does track with eyes, but not responding to questions or commands. Last seen at baseline by nurse at 10:45PM. Anticoagulation was held, NSGY was consulted, CTH was done STAT, patient was okayed by neurosurgery to continue back on anticoagulation, was placed back on a heparin drip. Neurology was consulted.  CTH noncontrast looks similar to prior, awaiting official read.   Labs: WBC 18. HGB 9.2. MCV 89. RDW 15. . INR 1.18. PT 13.7. PTT 30.7. Chance 134. BUN 25. Cr 1.55. Glu 100. Ca 8.3. Alb 2.8. AlkP 134. Amylase 206. Lipase 404. LDL 63. A1c 7.3.  . /45. RR 30. O2 100%. Temp 100.8 Patient on heparin drip, insulin, milrinone. Febrile, ID following, pt on vancomycin and cefepime.    TTE 12/10   1. Mitral annular calcification, otherwise normal mitral valve. Mild-moderate mitral regurgitation.  2. Normal left ventricular internal dimensions and wall thickness.  3. Severe global left ventricular systolic dysfunction. Endocardial visualization enhanced with intravenous injection of Ultrasonic Enhancing Agent (Definity).  No LV thrombus seen.  4. The right ventricle is not well visualized.    Review of Systems:  patient intubated so unable to assess (HYUN)    Allergies: NKDA    PMHx/PSHx: As above    FHx: HYUN    SHx: No reports of current tobacco, alcohol, or illicit drug use    Medications:  MEDICATIONS  (STANDING):  acetaminophen   IVPB .. 1000 milliGRAM(s) IV Intermittent once  aMIOdarone    Tablet   Oral   aMIOdarone    Tablet 400 milliGRAM(s) Oral every 8 hours  atorvastatin 40 milliGRAM(s) Oral at bedtime  cefepime   IVPB 1000 milliGRAM(s) IV Intermittent every 8 hours  chlorhexidine 0.12% Liquid 15 milliLiter(s) Oral Mucosa every 12 hours  chlorhexidine 2% Cloths 1 Application(s) Topical <User Schedule>  CRRT Treatment    <Continuous>  dexMEDEtomidine Infusion 1 MICROgram(s)/kG/Hr (23.2 mL/Hr) IV Continuous <Continuous>  fluconAZOLE IVPB      fluconAZOLE IVPB 200 milliGRAM(s) IV Intermittent every 24 hours  heparin  Infusion 800 Unit(s)/Hr (9 mL/Hr) IV Continuous <Continuous>  insulin regular Infusion 3 Unit(s)/Hr (3 mL/Hr) IV Continuous <Continuous>  milrinone Infusion 0.1 MICROgram(s)/kG/Min (2.79 mL/Hr) IV Continuous <Continuous>  pantoprazole  Injectable 40 milliGRAM(s) IV Push two times a day  Phoxillum Filtration BK 4 / 2.5 5000 milliLiter(s) (1200 mL/Hr) CRRT <Continuous>  polyethylene glycol 3350 17 Gram(s) Oral two times a day  PrismaSATE Dialysate BK 0 / 3.5 5000 milliLiter(s) (2000 mL/Hr) CRRT <Continuous>  PrismaSOL Filtration BGK 4 / 2.5 5000 milliLiter(s) (200 mL/Hr) CRRT <Continuous>  senna 2 Tablet(s) Oral at bedtime  sodium chloride 0.9%. 1000 milliLiter(s) (10 mL/Hr) IV Continuous <Continuous>  vancomycin  IVPB 1000 milliGRAM(s) IV Intermittent every 12 hours  vasopressin Infusion 0.033 Unit(s)/Min (5 mL/Hr) IV Continuous <Continuous>    MEDICATIONS  (PRN):      Vitals:  T(C): 38.2 (12-12-22 @ 01:45), Max: 38.2 (12-12-22 @ 01:45)  HR: 118 (12-12-22 @ 01:45) (75 - 170)  BP: --  RR: 25 (12-12-22 @ 01:45) (8 - 32)  SpO2: 100% (12-12-22 @ 01:45) (96% - 100%)    Physical Examination:    General - Male patient, lying in bed, intubated, patient constantly shivering  . 100% O2 sat on vent. RR 29. Temp 37.7. BP 96/55.   Pt is on IABP, is on amiodarone 0.5mg/min, heparin 1100 units/hr, milrinone 0.1mg/kg/min, IVF. (Per nurse, had recently been on precedex 0.3 but it was turned off at 11pm)  Cardiovascular - edema b/l le  Eyes -  Fundoscopy not performed due to safety precautions in the setting of the COVID-19 pandemic    Neurologic Exam:  Mental status - Awake, eyes open, blinking constantly, not following any commands (squeeze hands, close eyes, raise eyebrows, etc)    Cranial nerves - PERRL, VF: difficult to asses blink to threat as patient blinking often, corneals intact; EOM: right gaze preference, can be overcome with oculocephalic maneuver; patient not tracking, face sensation cannot asses; facial strength intact without asymmetry b/l, hearing difficult to assess, tongue cannot assess; gag intact    Motor - Normal bulk and tone throughout.  Patient not moving any extremity spontaneously, limbs fall immediately.    Sensation - No grimace to noxious stimuli in all extremities.     DTR's -             Biceps      Triceps     Brachioradialis      Patellar      Toes/plantar response  R             0+             0+                  2+          0                  mute  L              2+             2+                 2+          0                 mute    Coordination - cannot assess    Gait and station - cannot assess    Labs:                        9.2    18.07 )-----------( 142      ( 12 Dec 2022 00:41 )             28.2     12-12    134<L>  |  99  |  25<H>  ----------------------------<  100<H>  3.8   |  20<L>  |  1.55<H>    Ca    8.3<L>      12 Dec 2022 00:41  Phos  3.4     12-12  Mg     2.1     12-12    TPro  6.2  /  Alb  2.8<L>  /  TBili  1.2  /  DBili  x   /  AST  44<H>  /  ALT  18  /  AlkPhos  134<H>  12-12    CAPILLARY BLOOD GLUCOSE  102 (12 Dec 2022 02:00)      POCT Blood Glucose.: 102 mg/dL (12 Dec 2022 01:49)    LIVER FUNCTIONS - ( 12 Dec 2022 00:41 )  Alb: 2.8 g/dL / Pro: 6.2 g/dL / ALK PHOS: 134 U/L / ALT: 18 U/L / AST: 44 U/L / GGT: x             Culture - Blood (collected 10 Dec 2022 15:45)  Source: .Blood Blood-Peripheral  Preliminary Report (11 Dec 2022 22:02):    No growth to date.    Culture - Blood (collected 10 Dec 2022 15:30)  Source: .Blood Blood-Peripheral  Preliminary Report (11 Dec 2022 22:02):    No growth to date.    Culture - Sputum (collected 10 Dec 2022 10:49)  Source: ET Tube ET Tube  Gram Stain (10 Dec 2022 23:07):    Moderate polymorphonuclear leukocytes per low power field    No Squamous epithelial cells per low power field    Few Yeast like cells per oil power field  Preliminary Report (11 Dec 2022 18:53):    Normal Respiratory Christy present    Culture - Sputum (collected 09 Dec 2022 04:58)  Source: ET Tube ET Tube  Gram Stain (09 Dec 2022 15:01):    Numerous polymorphonuclear leukocytes per low power field    Moderate Squamous epithelial cells per low power field    Few Yeast like cells    per oil power field  Final Report (11 Dec 2022 14:03):    Normal Respiratory Christy present      PT/INR - ( 12 Dec 2022 00:41 )   PT: 13.7 sec;   INR: 1.18 ratio         PTT - ( 12 Dec 2022 00:41 )  PTT:30.7 sec  CSF:        Radiology:  CT Head No Cont:  (06 Dec 2022 13:02)     Neurology - Consult Note  -  Spectra: [phone] ([facility]), [phone] ([facility])  -  HPI: Per chart review: Patient EDUARDO REAL is a 62y (1960) man with a hx of CAD s/p 4v CABG ~2019 in [facility], DMII A1c 7.3%, HTN, HLD, who originally presented as a transfer from [facility] with concern for STEMI, had presented to [facility] with abdominal pain, n/v, was found to have pancreatitis and elevated troponins. CT abdomen revealed acute pancreatitis and his initial LVEF was 40-45%. He developed MSOF with hypoxic respiratory failure requiring intubation and ERIC and went into hypoxic PEA arrest requiring ACLS and due to persistent hypoxia and hypotension on pressors after ROSC was cannulated to [facility] ECMO on 11/25. Notably CTH that day revealed small subdural hemorrhage. His post arrest TTE on 11/26 showed a significantly worse LVEF of 5-10% with an AV that was only minimally opening.      Per consult received over the phone: Patient s/p fulminant cardiogenic shock had been placed on ECMO, now off of ECMO and on balloon pump, was on anticoagulation entire time. Had been noted to have a small SDH on 11/25. Patient was following commands. On 12/11/22, patient noted to have an acute change: no longer following any commands, not moving arms or legs b/l, not withdrawing to pain, does track with eyes, but not responding to questions or commands. Last seen at baseline by nurse at 10:45PM. Anticoagulation was held, NSGY was consulted, CTH was done STAT, patient was okayed by neurosurgery to continue back on anticoagulation, was placed back on a heparin drip. Neurology was consulted.  CTH noncontrast looks similar to prior, awaiting official read.   Labs: WBC 18. HGB 9.2. MCV 89. RDW 15. . INR 1.18. PT 13.7. PTT 30.7. Chance 134. BUN 25. Cr 1.55. Glu 100. Ca 8.3. Alb 2.8. AlkP 134. Amylase 206. Lipase 404. LDL 63. A1c 7.3.  . /45. RR 30. O2 100%. Temp 100.8 Patient on heparin drip, insulin, milrinone. Febrile, ID following, pt on vancomycin and cefepime.    TTE 12/10   1. Mitral annular calcification, otherwise normal mitral valve. Mild-moderate mitral regurgitation.  2. Normal left ventricular internal dimensions and wall thickness.  3. Severe global left ventricular systolic dysfunction. Endocardial visualization enhanced with intravenous injection of Ultrasonic Enhancing Agent (Definity).  No LV thrombus seen.  4. The right ventricle is not well visualized.    Review of Systems:  patient intubated so unable to assess (HYUN)    Allergies: NKDA    PMHx/PSHx: As above    FHx: HYUN    SHx: No reports of current tobacco, alcohol, or illicit drug use    Medications:  MEDICATIONS  (STANDING):  acetaminophen   IVPB .. 1000 milliGRAM(s) IV Intermittent once  aMIOdarone    Tablet   Oral   aMIOdarone    Tablet 400 milliGRAM(s) Oral every 8 hours  atorvastatin 40 milliGRAM(s) Oral at bedtime  cefepime   IVPB 1000 milliGRAM(s) IV Intermittent every 8 hours  chlorhexidine 0.12% Liquid 15 milliLiter(s) Oral Mucosa every 12 hours  chlorhexidine 2% Cloths 1 Application(s) Topical <User Schedule>  CRRT Treatment    <Continuous>  dexMEDEtomidine Infusion 1 MICROgram(s)/kG/Hr (23.2 mL/Hr) IV Continuous <Continuous>  fluconAZOLE IVPB      fluconAZOLE IVPB 200 milliGRAM(s) IV Intermittent every 24 hours  heparin  Infusion 800 Unit(s)/Hr (9 mL/Hr) IV Continuous <Continuous>  insulin regular Infusion 3 Unit(s)/Hr (3 mL/Hr) IV Continuous <Continuous>  milrinone Infusion 0.1 MICROgram(s)/kG/Min (2.79 mL/Hr) IV Continuous <Continuous>  pantoprazole  Injectable 40 milliGRAM(s) IV Push two times a day  Phoxillum Filtration BK 4 / 2.5 5000 milliLiter(s) (1200 mL/Hr) CRRT <Continuous>  polyethylene glycol 3350 17 Gram(s) Oral two times a day  PrismaSATE Dialysate BK 0 / 3.5 5000 milliLiter(s) (2000 mL/Hr) CRRT <Continuous>  PrismaSOL Filtration BGK 4 / 2.5 5000 milliLiter(s) (200 mL/Hr) CRRT <Continuous>  senna 2 Tablet(s) Oral at bedtime  sodium chloride 0.9%. 1000 milliLiter(s) (10 mL/Hr) IV Continuous <Continuous>  vancomycin  IVPB 1000 milliGRAM(s) IV Intermittent every 12 hours  vasopressin Infusion 0.033 Unit(s)/Min (5 mL/Hr) IV Continuous <Continuous>    MEDICATIONS  (PRN):      Vitals:  T(C): 38.2 (12-12-22 @ 01:45), Max: 38.2 (12-12-22 @ 01:45)  HR: 118 (12-12-22 @ 01:45) (75 - 170)  BP: --  RR: 25 (12-12-22 @ 01:45) (8 - 32)  SpO2: 100% (12-12-22 @ 01:45) (96% - 100%)    Physical Examination:    General - Male patient, lying in bed, intubated, patient constantly shivering  . 100% O2 sat on vent. RR 29. Temp 37.7. BP 96/55.   Pt is on IABP, is on amiodarone 0.5mg/min, heparin 1100 units/hr, milrinone 0.1mg/kg/min, IVF. (Per nurse, had recently been on precedex 0.3 but it was turned off at 11pm)  Cardiovascular - edema b/l le  Eyes -  Fundoscopy not performed due to safety precautions in the setting of the COVID-19 pandemic    Neurologic Exam:  Mental status - Awake, eyes open, blinking constantly, not following any commands (squeeze hands, close eyes, raise eyebrows, etc)    Cranial nerves - PERRL, VF: difficult to asses blink to threat as patient blinking often, corneals intact; EOM: right gaze preference, can be overcome with oculocephalic maneuver; patient not tracking, face sensation cannot asses; facial strength intact without asymmetry b/l, hearing difficult to assess, tongue cannot assess; gag intact    Motor - Normal bulk and tone throughout.  Patient not moving any extremity spontaneously, limbs fall immediately.    Sensation - No grimace to noxious stimuli in all extremities.     DTR's -             Biceps      Triceps     Brachioradialis      Patellar      Toes/plantar response  R             0+             0+                  2+          0                  mute  L              2+             2+                 2+          0                 mute    Coordination - cannot assess    Gait and station - cannot assess    Labs:                        9.2    18.07 )-----------( 142      ( 12 Dec 2022 00:41 )             28.2     12-12    134<L>  |  99  |  25<H>  ----------------------------<  100<H>  3.8   |  20<L>  |  1.55<H>    Ca    8.3<L>      12 Dec 2022 00:41  Phos  3.4     12-12  Mg     2.1     12-12    TPro  6.2  /  Alb  2.8<L>  /  TBili  1.2  /  DBili  x   /  AST  44<H>  /  ALT  18  /  AlkPhos  134<H>  12-12    CAPILLARY BLOOD GLUCOSE  102 (12 Dec 2022 02:00)      POCT Blood Glucose.: 102 mg/dL (12 Dec 2022 01:49)    LIVER FUNCTIONS - ( 12 Dec 2022 00:41 )  Alb: 2.8 g/dL / Pro: 6.2 g/dL / ALK PHOS: 134 U/L / ALT: 18 U/L / AST: 44 U/L / GGT: x             Culture - Blood (collected 10 Dec 2022 15:45)  Source: .Blood Blood-Peripheral  Preliminary Report (11 Dec 2022 22:02):    No growth to date.    Culture - Blood (collected 10 Dec 2022 15:30)  Source: .Blood Blood-Peripheral  Preliminary Report (11 Dec 2022 22:02):    No growth to date.    Culture - Sputum (collected 10 Dec 2022 10:49)  Source: ET Tube ET Tube  Gram Stain (10 Dec 2022 23:07):    Moderate polymorphonuclear leukocytes per low power field    No Squamous epithelial cells per low power field    Few Yeast like cells per oil power field  Preliminary Report (11 Dec 2022 18:53):    Normal Respiratory Christy present    Culture - Sputum (collected 09 Dec 2022 04:58)  Source: ET Tube ET Tube  Gram Stain (09 Dec 2022 15:01):    Numerous polymorphonuclear leukocytes per low power field    Moderate Squamous epithelial cells per low power field    Few Yeast like cells    per oil power field  Final Report (11 Dec 2022 14:03):    Normal Respiratory Christy present      PT/INR - ( 12 Dec 2022 00:41 )   PT: 13.7 sec;   INR: 1.18 ratio         PTT - ( 12 Dec 2022 00:41 )  PTT:30.7 sec  CSF:        Radiology:  CT Head No Cont:  (06 Dec 2022 13:02)

## (undated) DEVICE — DRAIN RESERVOIR FOR JACKSON PRATT 100CC CARDINAL

## (undated) DEVICE — MEDICATION LABELS W MARKER

## (undated) DEVICE — TOURNIQUET ESMARK 6"

## (undated) DEVICE — PACK UNIVERSAL CARDIAC

## (undated) DEVICE — ELCTR BOVIE TIP BLADE VALLEYLAB 6.5"

## (undated) DEVICE — DRAPE INSTRUMENT POUCH 6.75" X 11"

## (undated) DEVICE — FOLEY TRAY 16FR 5CC LTX UMETER CLOSED

## (undated) DEVICE — BLADE SCALPEL SAFETYLOCK #10

## (undated) DEVICE — GLV 8 RADIATION

## (undated) DEVICE — SYR LUER LOK 10CC

## (undated) DEVICE — LUBRICATING JELLY ONESHOT 1.25OZ

## (undated) DEVICE — BLADE SCALPEL SAFETYLOCK #15

## (undated) DEVICE — SUT BOOT STANDARD (ASSORTED) 5 PAIR

## (undated) DEVICE — FOLEY TRAY 16FR 5CC LF LUBRISIL ADVANCE TEMP CLOSED

## (undated) DEVICE — WARMING BLANKET FULL UNDERBODY

## (undated) DEVICE — SUT PROLENE 5-0 30" RB-2

## (undated) DEVICE — ELCTR BOVIE PENCIL HANDPIECE ROCKER SWITCH 15FT

## (undated) DEVICE — GOWN TRIMAX XXL

## (undated) DEVICE — LAP PAD 18 X 18"

## (undated) DEVICE — SPECIMEN CONTAINER 100ML

## (undated) DEVICE — ELCTR HEX BLADE

## (undated) DEVICE — DRSG CURITY GAUZE SPONGE 4 X 4" 12-PLY

## (undated) DEVICE — SUT PROLENE 4-0 36" BB

## (undated) DEVICE — SUT BIOSYN 4-0 18" P-12

## (undated) DEVICE — MARKING PEN W RULER

## (undated) DEVICE — GLV 7.5 PROTEXIS (WHITE)

## (undated) DEVICE — VESSEL LOOP MAXI-BLUE 0.120" X 16"

## (undated) DEVICE — GOWN TRIMAX LG

## (undated) DEVICE — SUT PROLENE 5-0 36" RB-1

## (undated) DEVICE — TOURNIQUET SET 12FR (1 RED, 1 BLUE, 1 SNARE) 7"

## (undated) DEVICE — GOWN LG

## (undated) DEVICE — Device

## (undated) DEVICE — DRAPE IOBAN 23" X 23"

## (undated) DEVICE — PACK MINOR NO DRAPE

## (undated) DEVICE — VISITEC 4X4

## (undated) DEVICE — VESSEL LOOP KEY SURG MAXI BLUE 2.4X1.2MM

## (undated) DEVICE — SUT VICRYL 0 36" CTX UNDYED

## (undated) DEVICE — DRSG VAC GRANUFOAM SMALL (BLACK)

## (undated) DEVICE — SUT STAINLESS STEEL 5 18" SCC

## (undated) DEVICE — SAW BLADE MICROAIRE OSCILLATING LG 0.9X64X33.5MM

## (undated) DEVICE — DRAPE FEMORAL ANGIOGRAPHY W TROUGH

## (undated) DEVICE — CLAMP BULLDOG MIDI 45 DEGREE (GREEN) DISP

## (undated) DEVICE — VESSEL LOOP MINI-BLUE 0.075" X 16"

## (undated) DEVICE — STRYKER INTERPULSE HANDPIECE W IRR SUCTION TUBE

## (undated) DEVICE — DRSG OPSITE 13.75 X 4"

## (undated) DEVICE — SUT SOFSILK 0 30" TIES

## (undated) DEVICE — GLV 8 PROTEGRITY

## (undated) DEVICE — SUT PROLENE 6-0 30" C-1

## (undated) DEVICE — BLADE SCALPEL SAFETYLOCK #11

## (undated) DEVICE — FOLEY HOLDER STATLOCK 3 WAY ADULT

## (undated) DEVICE — SUT PROLENE 6-0 4-18" BV-1

## (undated) DEVICE — VENODYNE/SCD SLEEVE CALF LARGE

## (undated) DEVICE — DEFIBRILLATOR PAD PRE-CONNECT ADULT/CHILD

## (undated) DEVICE — DRAPE C ARM UNIVERSAL

## (undated) DEVICE — DRAPE LIGHT HANDLE COVER (BLUE)

## (undated) DEVICE — DRSG TEGADERM 4X4.75"

## (undated) DEVICE — PACK AV FISTULA

## (undated) DEVICE — SUT SILK 3-0 18" TIES

## (undated) DEVICE — CABLE TIE BAR LOK 7.5"

## (undated) DEVICE — DRSG STERISTRIPS 0.5 X 4"

## (undated) DEVICE — CONNECTOR STRAIGHT 3/8 X 3/8" W LUER LOCK

## (undated) DEVICE — SUT BIOSYN 3-0 18" P-12

## (undated) DEVICE — GOWN XL

## (undated) DEVICE — STEALTH CLAMP INSERT FIBRA/FIBRA 90MM

## (undated) DEVICE — SOL NORMOSOL-R PH7.4 1000ML

## (undated) DEVICE — SUT VICRYL 2-0 27" CT-1 UNDYED

## (undated) DEVICE — SUT TICRON 5 30" KV-40

## (undated) DEVICE — SAW BLADE MICROAIRE STERNUM 1.1X50X42MM

## (undated) DEVICE — VESSEL LOOP KEY SURG MINI RED 2.4X1.2MM

## (undated) DEVICE — SUT SOFSILK 2-0 18" TIES

## (undated) DEVICE — POSITIONER CARDIAC BUMP

## (undated) DEVICE — SUT SOFSILK 2-0 30" V-20

## (undated) DEVICE — PERFUSION PACK LAFA

## (undated) DEVICE — PACK BASIN SPECIAL PROCEDURE

## (undated) DEVICE — SYR ASEPTO

## (undated) DEVICE — SUT MONOCRYL 5-0 18" PS-2

## (undated) DEVICE — WARMING BLANKET DUO-THERM HYPER/HYPOTHERM ADULT

## (undated) DEVICE — VESSEL LOOP MAXI-RED  0.120" X 16"

## (undated) DEVICE — SUT POLYSORB 3-0 30" V-20 UNDYED

## (undated) DEVICE — CLAMP BULLDOG MIDI STRAIGHT (YELLOW) DISP

## (undated) DEVICE — SOL IRR POUR H2O 250ML

## (undated) DEVICE — ELCTR BOVIE TIP BLADE INSULATED 2.75" EDGE

## (undated) DEVICE — SUT POLYSORB 2-0 30" GS-21 UNDYED

## (undated) DEVICE — PACING CABLE A/V TEMP SCREW DOWN 6FT

## (undated) DEVICE — GOWN SLEEVES

## (undated) DEVICE — ELCTR BOVIE TIP CLEANER SCRATCH PAD

## (undated) DEVICE — QUADROX ID ADULT HMOD-70000-USA

## (undated) DEVICE — DRAPE 1/2 SHEET 40X57"

## (undated) DEVICE — GLV 8.5 PROTEXIS (WHITE)

## (undated) DEVICE — SUCTION CATH ARGYLE WHISTLE TIP 14FR STRAIGHT PACKED

## (undated) DEVICE — ELCTR REM POLYHESIVE ADULT PT RETURN 15FT

## (undated) DEVICE — CONNECTOR STRAIGHT 3/8 X 1/2"

## (undated) DEVICE — SUT PERMAHAND 1 10-30"

## (undated) DEVICE — SUT PROLENE 3-0 36" MH

## (undated) DEVICE — STEALTH CLAMP INSERT FIBRA/FIBRA 60MM

## (undated) DEVICE — DRAPE TOWEL BLUE 17" X 24"

## (undated) DEVICE — SUT SOFSILK 4-0 18" TIES

## (undated) DEVICE — SENSOR MYOCARDIAL TEMP 15MM

## (undated) DEVICE — SUT PROLENE 6-0 18" BV-1

## (undated) DEVICE — SUT BLUNT SZ 5

## (undated) DEVICE — POSITIONER FOAM EGG CRATE ULNAR 2PCS (PINK)

## (undated) DEVICE — PREP CHLORAPREP HI-LITE ORANGE 26ML

## (undated) DEVICE — SAW BLADE MICROAIRE STERNUM 1X34X9.4MM

## (undated) DEVICE — GLV 7 PROTEXIS (WHITE)

## (undated) DEVICE — POSITIONER FOAM HEADREST (PINK)

## (undated) DEVICE — TUBING SUCTION 20FT

## (undated) DEVICE — SOL IRR POUR NS 0.9% 500ML

## (undated) DEVICE — CANISTER KCI 500ML GEL SENSA TRAC

## (undated) DEVICE — SUT SOFSILK 0 30" V-20

## (undated) DEVICE — PACK UNIVERSAL CARDIAC SUPPLEMNTAL B

## (undated) DEVICE — DRSG OPSITE 2.5 X 2"

## (undated) DEVICE — SUCTION YANKAUER NO CONTROL VENT

## (undated) DEVICE — DRSG TEGADERM 6"X8"

## (undated) DEVICE — SUT SURGICAL STEEL 6 30" BP-1

## (undated) DEVICE — SUT PLEDGET PRE PUNCH 4.8 X 9.5 X 1.5 MM

## (undated) DEVICE — DRAPE 3/4 SHEET W REINFORCEMENT 56X77"

## (undated) DEVICE — GLV 8 PROTEXIS (WHITE)

## (undated) DEVICE — SOL INJ NS 0.9% 500ML 1-PORT

## (undated) DEVICE — STOPCOCK 4-WAY 2 GANG W SWIVEL MALE LUER LOCK

## (undated) DEVICE — GLV 6.5 PROTEXIS (WHITE)

## (undated) DEVICE — SUT DOUBLE 6 WIRE STERNAL

## (undated) DEVICE — DRAPE MAYO STAND 30"

## (undated) DEVICE — CONNECTOR STRAIGHT 3/8 X 3/8"

## (undated) DEVICE — STAPLER SKIN VISI-STAT 35 WIDE

## (undated) DEVICE — WARMING BLANKET LOWER ADULT